# Patient Record
Sex: MALE | Race: WHITE | NOT HISPANIC OR LATINO | Employment: OTHER | URBAN - METROPOLITAN AREA
[De-identification: names, ages, dates, MRNs, and addresses within clinical notes are randomized per-mention and may not be internally consistent; named-entity substitution may affect disease eponyms.]

---

## 2022-01-21 ENCOUNTER — APPOINTMENT (EMERGENCY)
Dept: RADIOLOGY | Facility: HOSPITAL | Age: 62
DRG: 674 | End: 2022-01-21
Payer: MEDICARE

## 2022-01-21 ENCOUNTER — HOSPITAL ENCOUNTER (INPATIENT)
Facility: HOSPITAL | Age: 62
LOS: 12 days | Discharge: NURSING, REHAB OR LONG-TERM CARE | DRG: 674 | End: 2022-02-02
Attending: EMERGENCY MEDICINE | Admitting: INTERNAL MEDICINE
Payer: MEDICARE

## 2022-01-21 DIAGNOSIS — N17.9 ACUTE KIDNEY INJURY SUPERIMPOSED ON CHRONIC KIDNEY DISEASE (HCC): ICD-10-CM

## 2022-01-21 DIAGNOSIS — N18.9 ACUTE KIDNEY INJURY SUPERIMPOSED ON CHRONIC KIDNEY DISEASE (HCC): ICD-10-CM

## 2022-01-21 DIAGNOSIS — E11.9 DIABETES MELLITUS (HCC): ICD-10-CM

## 2022-01-21 DIAGNOSIS — N18.6 ESRD (END STAGE RENAL DISEASE) (HCC): ICD-10-CM

## 2022-01-21 DIAGNOSIS — N19 RENAL FAILURE: Primary | ICD-10-CM

## 2022-01-21 DIAGNOSIS — S22.079A T10 VERTEBRAL FRACTURE (HCC): ICD-10-CM

## 2022-01-21 LAB
2HR DELTA HS TROPONIN: -1 NG/L
ALBUMIN SERPL BCP-MCNC: 2.3 G/DL (ref 3.5–5)
ALP SERPL-CCNC: 104 U/L (ref 46–116)
ALT SERPL W P-5'-P-CCNC: 32 U/L (ref 12–78)
ANION GAP SERPL CALCULATED.3IONS-SCNC: 13 MMOL/L (ref 4–13)
AST SERPL W P-5'-P-CCNC: 37 U/L (ref 5–45)
BACTERIA UR QL AUTO: NORMAL /HPF
BASOPHILS # BLD AUTO: 0.03 THOUSANDS/ΜL (ref 0–0.1)
BASOPHILS NFR BLD AUTO: 0 % (ref 0–1)
BILIRUB SERPL-MCNC: 0.19 MG/DL (ref 0.2–1)
BILIRUB UR QL STRIP: NEGATIVE
BUN SERPL-MCNC: 63 MG/DL (ref 5–25)
CALCIUM ALBUM COR SERPL-MCNC: 9.3 MG/DL (ref 8.3–10.1)
CALCIUM SERPL-MCNC: 7.9 MG/DL (ref 8.3–10.1)
CARDIAC TROPONIN I PNL SERPL HS: 16 NG/L
CARDIAC TROPONIN I PNL SERPL HS: 17 NG/L
CHLORIDE SERPL-SCNC: 108 MMOL/L (ref 100–108)
CLARITY UR: CLEAR
CO2 SERPL-SCNC: 19 MMOL/L (ref 21–32)
COLOR UR: ABNORMAL
CREAT SERPL-MCNC: 5.79 MG/DL (ref 0.6–1.3)
EOSINOPHIL # BLD AUTO: 0.59 THOUSAND/ΜL (ref 0–0.61)
EOSINOPHIL NFR BLD AUTO: 7 % (ref 0–6)
ERYTHROCYTE [DISTWIDTH] IN BLOOD BY AUTOMATED COUNT: 14.7 % (ref 11.6–15.1)
GFR SERPL CREATININE-BSD FRML MDRD: 9 ML/MIN/1.73SQ M
GLUCOSE SERPL-MCNC: 128 MG/DL (ref 65–140)
GLUCOSE SERPL-MCNC: 149 MG/DL (ref 65–140)
GLUCOSE SERPL-MCNC: 176 MG/DL (ref 65–140)
GLUCOSE UR STRIP-MCNC: ABNORMAL MG/DL
HCT VFR BLD AUTO: 32.2 % (ref 36.5–49.3)
HGB BLD-MCNC: 10 G/DL (ref 12–17)
HGB UR QL STRIP.AUTO: ABNORMAL
IMM GRANULOCYTES # BLD AUTO: 0.04 THOUSAND/UL (ref 0–0.2)
IMM GRANULOCYTES NFR BLD AUTO: 1 % (ref 0–2)
KETONES UR STRIP-MCNC: NEGATIVE MG/DL
LEUKOCYTE ESTERASE UR QL STRIP: NEGATIVE
LYMPHOCYTES # BLD AUTO: 0.83 THOUSANDS/ΜL (ref 0.6–4.47)
LYMPHOCYTES NFR BLD AUTO: 10 % (ref 14–44)
MAGNESIUM SERPL-MCNC: 2.1 MG/DL (ref 1.6–2.6)
MCH RBC QN AUTO: 28.9 PG (ref 26.8–34.3)
MCHC RBC AUTO-ENTMCNC: 31.1 G/DL (ref 31.4–37.4)
MCV RBC AUTO: 93 FL (ref 82–98)
MONOCYTES # BLD AUTO: 0.8 THOUSAND/ΜL (ref 0.17–1.22)
MONOCYTES NFR BLD AUTO: 10 % (ref 4–12)
NEUTROPHILS # BLD AUTO: 6.09 THOUSANDS/ΜL (ref 1.85–7.62)
NEUTS SEG NFR BLD AUTO: 72 % (ref 43–75)
NITRITE UR QL STRIP: NEGATIVE
NON-SQ EPI CELLS URNS QL MICRO: NORMAL /HPF
NRBC BLD AUTO-RTO: 0 /100 WBCS
NT-PROBNP SERPL-MCNC: 1864 PG/ML
PH UR STRIP.AUTO: 6 [PH]
PLATELET # BLD AUTO: 347 THOUSANDS/UL (ref 149–390)
PMV BLD AUTO: 9.4 FL (ref 8.9–12.7)
POTASSIUM SERPL-SCNC: 5.1 MMOL/L (ref 3.5–5.3)
PROT SERPL-MCNC: 6.9 G/DL (ref 6.4–8.2)
PROT UR STRIP-MCNC: ABNORMAL MG/DL
RBC # BLD AUTO: 3.46 MILLION/UL (ref 3.88–5.62)
RBC #/AREA URNS AUTO: NORMAL /HPF
SODIUM SERPL-SCNC: 140 MMOL/L (ref 136–145)
SP GR UR STRIP.AUTO: 1.01 (ref 1–1.03)
UROBILINOGEN UR QL STRIP.AUTO: 0.2 E.U./DL
WBC # BLD AUTO: 8.38 THOUSAND/UL (ref 4.31–10.16)
WBC #/AREA URNS AUTO: NORMAL /HPF

## 2022-01-21 PROCEDURE — 71045 X-RAY EXAM CHEST 1 VIEW: CPT

## 2022-01-21 PROCEDURE — 99285 EMERGENCY DEPT VISIT HI MDM: CPT | Performed by: EMERGENCY MEDICINE

## 2022-01-21 PROCEDURE — 80053 COMPREHEN METABOLIC PANEL: CPT | Performed by: EMERGENCY MEDICINE

## 2022-01-21 PROCEDURE — 96375 TX/PRO/DX INJ NEW DRUG ADDON: CPT

## 2022-01-21 PROCEDURE — 99285 EMERGENCY DEPT VISIT HI MDM: CPT

## 2022-01-21 PROCEDURE — 83735 ASSAY OF MAGNESIUM: CPT | Performed by: EMERGENCY MEDICINE

## 2022-01-21 PROCEDURE — 96374 THER/PROPH/DIAG INJ IV PUSH: CPT

## 2022-01-21 PROCEDURE — 72100 X-RAY EXAM L-S SPINE 2/3 VWS: CPT

## 2022-01-21 PROCEDURE — 83880 ASSAY OF NATRIURETIC PEPTIDE: CPT | Performed by: EMERGENCY MEDICINE

## 2022-01-21 PROCEDURE — 99223 1ST HOSP IP/OBS HIGH 75: CPT | Performed by: INTERNAL MEDICINE

## 2022-01-21 PROCEDURE — 93005 ELECTROCARDIOGRAM TRACING: CPT

## 2022-01-21 PROCEDURE — 84484 ASSAY OF TROPONIN QUANT: CPT | Performed by: EMERGENCY MEDICINE

## 2022-01-21 PROCEDURE — 85025 COMPLETE CBC W/AUTO DIFF WBC: CPT | Performed by: EMERGENCY MEDICINE

## 2022-01-21 PROCEDURE — 82948 REAGENT STRIP/BLOOD GLUCOSE: CPT

## 2022-01-21 PROCEDURE — 36415 COLL VENOUS BLD VENIPUNCTURE: CPT | Performed by: EMERGENCY MEDICINE

## 2022-01-21 PROCEDURE — 81001 URINALYSIS AUTO W/SCOPE: CPT | Performed by: EMERGENCY MEDICINE

## 2022-01-21 RX ORDER — CLOPIDOGREL BISULFATE 75 MG/1
75 TABLET ORAL DAILY
COMMUNITY
Start: 2021-11-02

## 2022-01-21 RX ORDER — ATORVASTATIN CALCIUM 80 MG/1
80 TABLET, FILM COATED ORAL DAILY
Status: DISCONTINUED | OUTPATIENT
Start: 2022-01-22 | End: 2022-02-02 | Stop reason: HOSPADM

## 2022-01-21 RX ORDER — FUROSEMIDE 10 MG/ML
40 INJECTION INTRAMUSCULAR; INTRAVENOUS ONCE
Status: COMPLETED | OUTPATIENT
Start: 2022-01-21 | End: 2022-01-21

## 2022-01-21 RX ORDER — ACETAMINOPHEN 325 MG/1
650 TABLET ORAL EVERY 6 HOURS PRN
Status: DISCONTINUED | OUTPATIENT
Start: 2022-01-21 | End: 2022-01-25

## 2022-01-21 RX ORDER — HYDROMORPHONE HCL/PF 1 MG/ML
1 SYRINGE (ML) INJECTION ONCE
Status: DISCONTINUED | OUTPATIENT
Start: 2022-01-21 | End: 2022-02-02 | Stop reason: HOSPADM

## 2022-01-21 RX ORDER — CLOPIDOGREL BISULFATE 75 MG/1
75 TABLET ORAL DAILY
Status: DISCONTINUED | OUTPATIENT
Start: 2022-01-22 | End: 2022-01-24

## 2022-01-21 RX ORDER — ONDANSETRON 2 MG/ML
4 INJECTION INTRAMUSCULAR; INTRAVENOUS EVERY 6 HOURS PRN
Status: DISCONTINUED | OUTPATIENT
Start: 2022-01-21 | End: 2022-02-02 | Stop reason: HOSPADM

## 2022-01-21 RX ORDER — LIDOCAINE 50 MG/G
1 PATCH TOPICAL ONCE
Status: COMPLETED | OUTPATIENT
Start: 2022-01-21 | End: 2022-01-22

## 2022-01-21 RX ORDER — PIOGLITAZONEHYDROCHLORIDE 15 MG/1
15 TABLET ORAL DAILY
COMMUNITY
Start: 2021-11-02 | End: 2022-02-02 | Stop reason: HOSPADM

## 2022-01-21 RX ORDER — HEPARIN SODIUM 5000 [USP'U]/ML
5000 INJECTION, SOLUTION INTRAVENOUS; SUBCUTANEOUS EVERY 8 HOURS SCHEDULED
Status: DISCONTINUED | OUTPATIENT
Start: 2022-01-21 | End: 2022-01-25

## 2022-01-21 RX ORDER — CARVEDILOL 12.5 MG/1
12.5 TABLET ORAL 2 TIMES DAILY
Status: DISCONTINUED | OUTPATIENT
Start: 2022-01-21 | End: 2022-02-02 | Stop reason: HOSPADM

## 2022-01-21 RX ORDER — CARVEDILOL 6.25 MG/1
12.5 TABLET ORAL 2 TIMES DAILY
COMMUNITY
Start: 2021-11-16

## 2022-01-21 RX ORDER — HYDROMORPHONE HCL/PF 1 MG/ML
1 SYRINGE (ML) INJECTION ONCE
Status: COMPLETED | OUTPATIENT
Start: 2022-01-21 | End: 2022-01-21

## 2022-01-21 RX ORDER — NIFEDIPINE 30 MG/1
30 TABLET, EXTENDED RELEASE ORAL 2 TIMES DAILY
Status: DISCONTINUED | OUTPATIENT
Start: 2022-01-21 | End: 2022-01-23

## 2022-01-21 RX ORDER — ALLOPURINOL 100 MG/1
100 TABLET ORAL DAILY
Status: DISCONTINUED | OUTPATIENT
Start: 2022-01-22 | End: 2022-02-02 | Stop reason: HOSPADM

## 2022-01-21 RX ORDER — LIDOCAINE 50 MG/G
1 PATCH TOPICAL DAILY
Status: DISCONTINUED | OUTPATIENT
Start: 2022-01-22 | End: 2022-02-02 | Stop reason: HOSPADM

## 2022-01-21 RX ORDER — ATORVASTATIN CALCIUM 80 MG/1
80 TABLET, FILM COATED ORAL DAILY
COMMUNITY
Start: 2021-11-02 | End: 2022-01-31

## 2022-01-21 RX ORDER — ALLOPURINOL 100 MG/1
100 TABLET ORAL DAILY
COMMUNITY
Start: 2021-11-02 | End: 2022-07-06

## 2022-01-21 RX ORDER — ONDANSETRON 2 MG/ML
4 INJECTION INTRAMUSCULAR; INTRAVENOUS ONCE
Status: COMPLETED | OUTPATIENT
Start: 2022-01-21 | End: 2022-01-21

## 2022-01-21 RX ORDER — NIFEDIPINE 30 MG/1
30 TABLET, FILM COATED, EXTENDED RELEASE ORAL 2 TIMES DAILY
COMMUNITY
Start: 2021-11-15

## 2022-01-21 RX ORDER — FENTANYL CITRATE 50 UG/ML
50 INJECTION, SOLUTION INTRAMUSCULAR; INTRAVENOUS ONCE
Status: COMPLETED | OUTPATIENT
Start: 2022-01-21 | End: 2022-01-21

## 2022-01-21 RX ORDER — HYDROMORPHONE HCL/PF 1 MG/ML
1 SYRINGE (ML) INJECTION EVERY 4 HOURS PRN
Status: DISCONTINUED | OUTPATIENT
Start: 2022-01-21 | End: 2022-01-23

## 2022-01-21 RX ORDER — ALBUMIN (HUMAN) 12.5 G/50ML
25 SOLUTION INTRAVENOUS EVERY 12 HOURS SCHEDULED
Status: DISCONTINUED | OUTPATIENT
Start: 2022-01-22 | End: 2022-01-25

## 2022-01-21 RX ORDER — FUROSEMIDE 10 MG/ML
40 INJECTION INTRAMUSCULAR; INTRAVENOUS
Status: DISCONTINUED | OUTPATIENT
Start: 2022-01-22 | End: 2022-01-23

## 2022-01-21 RX ORDER — PIOGLITAZONEHYDROCHLORIDE 15 MG/1
15 TABLET ORAL DAILY
Status: DISCONTINUED | OUTPATIENT
Start: 2022-01-22 | End: 2022-01-23

## 2022-01-21 RX ORDER — ONDANSETRON 2 MG/ML
4 INJECTION INTRAMUSCULAR; INTRAVENOUS EVERY 6 HOURS PRN
Status: DISCONTINUED | OUTPATIENT
Start: 2022-01-21 | End: 2022-01-21 | Stop reason: SDUPTHER

## 2022-01-21 RX ADMIN — HYDROMORPHONE HYDROCHLORIDE 1 MG: 1 INJECTION, SOLUTION INTRAMUSCULAR; INTRAVENOUS; SUBCUTANEOUS at 16:04

## 2022-01-21 RX ADMIN — CARVEDILOL 12.5 MG: 12.5 TABLET, FILM COATED ORAL at 17:44

## 2022-01-21 RX ADMIN — LIDOCAINE 5% 1 PATCH: 700 PATCH TOPICAL at 16:46

## 2022-01-21 RX ADMIN — HYDROMORPHONE HYDROCHLORIDE 1 MG: 1 INJECTION, SOLUTION INTRAMUSCULAR; INTRAVENOUS; SUBCUTANEOUS at 21:58

## 2022-01-21 RX ADMIN — HYDROMORPHONE HYDROCHLORIDE 1 MG: 1 INJECTION, SOLUTION INTRAMUSCULAR; INTRAVENOUS; SUBCUTANEOUS at 14:40

## 2022-01-21 RX ADMIN — FUROSEMIDE 40 MG: 10 INJECTION, SOLUTION INTRAMUSCULAR; INTRAVENOUS at 12:26

## 2022-01-21 RX ADMIN — HYDROMORPHONE HYDROCHLORIDE 1 MG: 1 INJECTION, SOLUTION INTRAMUSCULAR; INTRAVENOUS; SUBCUTANEOUS at 17:45

## 2022-01-21 RX ADMIN — FENTANYL CITRATE 50 MCG: 50 INJECTION INTRAMUSCULAR; INTRAVENOUS at 12:26

## 2022-01-21 RX ADMIN — INSULIN LISPRO 2 UNITS: 100 INJECTION, SOLUTION INTRAVENOUS; SUBCUTANEOUS at 17:44

## 2022-01-21 RX ADMIN — ONDANSETRON 4 MG: 2 INJECTION INTRAMUSCULAR; INTRAVENOUS at 16:30

## 2022-01-21 RX ADMIN — HEPARIN SODIUM 5000 UNITS: 5000 INJECTION INTRAVENOUS; SUBCUTANEOUS at 21:21

## 2022-01-21 RX ADMIN — NIFEDIPINE 30 MG: 30 TABLET, EXTENDED RELEASE ORAL at 17:43

## 2022-01-21 NOTE — ED PROVIDER NOTES
History  Chief Complaint   Patient presents with   Efren Hsarad Fall     pt reports a fall a week ago  Pt reports bilatv lower leg swelling that worsened after the fall  Pt reports scrotum started to swell 2 days ago  Pt reports lower back pain since the fall   Pt denies any difficuty urinating   Back Pain     69-year-old male presents after a fall a week ago  Complaining of lower back pain  He also has bilateral lower leg swelling that has worsened after the fall which is going up his thighs as well as the scrotum  Denies any head injury loss of consciousness neck pain  No abdominal pain noted  History of chronic kidney disease says he was supposed to be on diuretics however he stopped because of his failing kidney function  No numbness tingling weakness in his legs no urinary incontinence no bowel incontinence no urine retention noted  History provided by:  Patient   used: No        Prior to Admission Medications   Prescriptions Last Dose Informant Patient Reported? Taking? NIFEdipine ER (ADALAT CC) 30 MG 24 hr tablet 1 19 at 0900  Yes Yes   Sig: Take 30 mg by mouth 2 (two) times a day   allopurinol (ZYLOPRIM) 100 mg tablet 1 19 at 0900  Yes Yes   Sig: Take 100 mg by mouth daily   atorvastatin (LIPITOR) 80 mg tablet 1 19 at 0900  Yes Yes   Sig: Take 80 mg by mouth daily   carvedilol (COREG) 6 25 mg tablet 1/19/2022 at 0900  Yes Yes   Sig: Take 12 5 mg by mouth 2 (two) times a day   clopidogrel (PLAVIX) 75 mg tablet 1/19/2022 at 0900  Yes Yes   Sig: Take 75 mg by mouth daily   linaGLIPtin 5 MG TABS 1/19/2022 at 0900  Yes Yes   Sig: Take 5 mg by mouth   pioglitazone (ACTOS) 15 mg tablet 1/19/2022 at 0900  Yes Yes   Sig: Take 15 mg by mouth daily      Facility-Administered Medications: None       Past Medical History:   Diagnosis Date    CKD     [facility] [address] [facility]     Hypertension     [facility] toe amputee [facility] [address] [facility]     TIA (transient ischemic attack)        History reviewed   No pertinent surgical history  History reviewed  No pertinent family history  I have reviewed and agree with the history as documented  E-Cigarette/Vaping    E-Cigarette Use Never User      E-Cigarette/Vaping Substances     Social History     Tobacco Use    Smoking status: Never Smoker    Smokeless tobacco: Never Used   Vaping Use    Vaping Use: Never used   Substance Use Topics    Alcohol use: Not Currently     Alcohol/week: 0 0 standard drinks     Comment: 0    Drug use: Never       Review of Systems   Constitutional: Negative  HENT: Negative  Eyes: Negative  Respiratory: Negative  Cardiovascular: Positive for leg swelling  Gastrointestinal: Negative  Endocrine: Negative  Genitourinary: Positive for penile swelling and scrotal swelling  Musculoskeletal: Positive for back pain  Skin: Negative  Allergic/Immunologic: Negative  Neurological: Negative  Hematological: Negative  Psychiatric/Behavioral: Negative  All other systems reviewed and are negative  Physical Exam  Physical Exam  Constitutional:       Appearance: Normal appearance  HENT:      Head: Normocephalic and atraumatic  Nose: Nose normal       Mouth/Throat:      Mouth: Mucous membranes are moist    Eyes:      Extraocular Movements: Extraocular movements intact  Pupils: Pupils are equal, round, and reactive to light  Cardiovascular:      Rate and Rhythm: Normal rate and regular rhythm  Pulmonary:      Effort: Pulmonary effort is normal       Breath sounds: Normal breath sounds  Abdominal:      General: Abdomen is flat  Bowel sounds are normal       Palpations: Abdomen is soft  Genitourinary:     Comments: Testicular swelling no did  Abdominal swelling edema noted with no evidence of infection  Musculoskeletal:         General: Swelling present  Normal range of motion  Cervical back: Normal range of motion and neck supple        Comments: Diffuse lumbar spine tenderness noted no step-offs noted  Skin:     General: Skin is warm  Capillary Refill: Capillary refill takes less than 2 seconds  Neurological:      General: No focal deficit present  Mental Status: He is alert and oriented to person, place, and time  Mental status is at baseline  Psychiatric:         Mood and Affect: Mood normal          Thought Content:  Thought content normal          Vital Signs  ED Triage Vitals   Temperature Pulse Respirations Blood Pressure SpO2   01/21/22 1721 01/21/22 1147 01/21/22 1147 01/21/22 1147 01/21/22 1147   97 8 °F (36 6 °C) 83 18 (!) 215/91 99 %      Temp Source Heart Rate Source Patient Position - Orthostatic VS BP Location FiO2 (%)   01/21/22 1721 01/21/22 1147 01/21/22 1147 01/21/22 1147 --   Oral Monitor Lying Right arm       Pain Score       01/21/22 1147       7           Vitals:    01/24/22 2252 01/25/22 0544 01/25/22 0800 01/25/22 1546   BP: 140/64 139/63 136/60 151/65   Pulse: 70      Patient Position - Orthostatic VS: Lying            Visual Acuity      ED Medications  Medications   HYDROmorphone (DILAUDID) injection 1 mg (1 mg Intravenous Not Given 1/21/22 1640)   allopurinol (ZYLOPRIM) tablet 100 mg (100 mg Oral Given 1/25/22 0805)   atorvastatin (LIPITOR) tablet 80 mg (80 mg Oral Given 1/25/22 0805)   carvedilol (COREG) tablet 12 5 mg (12 5 mg Oral Given 1/25/22 0805)   ondansetron (ZOFRAN) injection 4 mg (4 mg Intravenous Not Given 1/24/22 0039)   heparin (porcine) subcutaneous injection 5,000 Units (5,000 Units Subcutaneous Given 1/25/22 1327)   lidocaine (LIDODERM) 5 % patch 1 patch (1 patch Topical Medication Applied 1/25/22 0805)   insulin lispro (HumaLOG) 100 units/mL subcutaneous injection 2-12 Units (2 Units Subcutaneous Not Given 1/25/22 1633)   insulin lispro (HumaLOG) 100 units/mL subcutaneous injection 1-5 Units (1 Units Subcutaneous Not Given 1/24/22 2127)   NIFEdipine (PROCARDIA XL) 24 hr tablet 30 mg (30 mg Oral Given 1/25/22 0805)   sodium bicarbonate tablet 1,300 mg (1,300 mg Oral Given 1/25/22 1326)   oxyCODONE (ROXICODONE) IR tablet 2 5 mg (has no administration in time range)   furosemide (LASIX) injection 80 mg (has no administration in time range)   sevelamer (RENAGEL) tablet 800 mg (800 mg Oral Given 1/25/22 1226)   albumin human (FLEXBUMIN) 25 % injection 25 g (has no administration in time range)   acetaminophen (TYLENOL) tablet 650 mg (650 mg Oral Given 1/25/22 1326)   HYDROmorphone (DILAUDID) injection 0 5 mg (has no administration in time range)   oxyCODONE (ROXICODONE) IR tablet 5 mg (5 mg Oral Given 1/25/22 1327)   fentanyl citrate (PF) 100 MCG/2ML 50 mcg (50 mcg Intravenous Given 1/21/22 1226)   furosemide (LASIX) injection 40 mg (40 mg Intravenous Given 1/21/22 1226)   HYDROmorphone (DILAUDID) injection 1 mg (1 mg Intravenous Given 1/21/22 1440)   HYDROmorphone (DILAUDID) injection 1 mg (1 mg Intravenous Given 1/21/22 1604)   ondansetron (ZOFRAN) injection 4 mg (4 mg Intravenous Given 1/21/22 1630)   lidocaine (LIDODERM) 5 % patch 1 patch (1 patch Topical Patch Removed 1/22/22 0623)       Diagnostic Studies  Results Reviewed     Procedure Component Value Units Date/Time    HS Troponin I 2hr [[id number]] Collected: 01/21/22 1402    Lab Status: Final result Specimen: Blood from Arm, Right Updated: 01/21/22 1440     hs TnI 2hr 16 ng/L      Delta 2hr hsTnI -1 ng/L     Urine Microscopic [[id number]]  (Normal) Collected: 01/21/22 1329    Lab Status: Final result Specimen: Urine, Clean Catch Updated: 01/21/22 1347     RBC, UA 0-1 /hpf      WBC, UA 0-1 /hpf      Epithelial Cells None Seen /hpf      Bacteria, UA None Seen /hpf     UA (URINE) with reflex to Scope [[id number]]  (Abnormal) Collected: 01/21/22 1329    Lab Status: Final result Specimen: Urine, Clean Catch Updated: 01/21/22 1339     Color, UA Light Yellow     Clarity, UA Clear     Specific Gravity, UA 1 015     pH, UA 6 0     Leukocytes, UA Negative     Nitrite, UA Negative     Protein,  (2+) mg/dl      Glucose,  (1/4%) mg/dl      Ketones, UA Negative mg/dl      Urobilinogen, UA 0 2 E U /dl      Bilirubin, UA Negative     Blood, UA Small    HS Troponin 0hr (reflex protocol) [[id number]] Collected: 01/21/22 1218    Lab Status: Final result Specimen: Blood from Arm, Right Updated: 01/21/22 1251     hs TnI 0hr 17 ng/L     Comprehensive metabolic panel [[id number]]  (Abnormal) Collected: 01/21/22 1218    Lab Status: Final result Specimen: Blood from Arm, Right Updated: 01/21/22 1251     Sodium 140 mmol/L      Potassium 5 1 mmol/L      Chloride 108 mmol/L      CO2 19 mmol/L      ANION GAP 13 mmol/L      BUN 63 mg/dL      Creatinine 5 79 mg/dL      Glucose 149 mg/dL      Calcium 7 9 mg/dL      Corrected Calcium 9 3 mg/dL      AST 37 U/L      ALT 32 U/L      Alkaline Phosphatase 104 U/L      Total Protein 6 9 g/dL      Albumin 2 3 g/dL      Total Bilirubin 0 19 mg/dL      eGFR 9 ml/min/1 73sq m     Narrative:      [facility] guidelines for Chronic Kidney Disease (CKD):     Stage 1 with normal or high GFR (GFR > 90 mL/min/1 73 square meters)    Stage 2 Mild CKD (GFR = 60-89 mL/min/1 73 square meters)    Stage 3A Moderate CKD (GFR = 45-59 mL/min/1 73 square meters)    Stage 3B Moderate CKD (GFR = 30-44 mL/min/1 73 square meters)    Stage 4 Severe CKD (GFR = 15-29 mL/min/1 73 square meters)    Stage 5 End Stage CKD (GFR <15 mL/min/1 73 square meters)  Note: GFR calculation is accurate only with a steady state creatinine    Magnesium [[id number]]  (Normal) Collected: 01/21/22 1218    Lab Status: Final result Specimen: Blood from Arm, Right Updated: 01/21/22 1251     Magnesium 2 1 mg/dL     NT-BNP PRO [[id number]]  (Abnormal) Collected: 01/21/22 1218    Lab Status: Final result Specimen: Blood from Arm, Right Updated: 01/21/22 1251     NT-proBNP 1,864 pg/mL     CBC and differential [[id number]]  (Abnormal) Collected: 01/21/22 1218    Lab Status: Final result Specimen: Blood from Arm, Right Updated: 01/21/22 1224     WBC 8 38 Thousand/uL      RBC 3 46 Million/uL      Hemoglobin 10 0 g/dL      Hematocrit 32 2 %      MCV 93 fL      MCH 28 9 pg      MCHC 31 1 g/dL      RDW 14 7 %      MPV 9 4 fL      Platelets 446 Thousands/uL      nRBC 0 /100 WBCs      Neutrophils Relative 72 %      Immat GRANS % 1 %      Lymphocytes Relative 10 %      Monocytes Relative 10 %      Eosinophils Relative 7 %      Basophils Relative 0 %      Neutrophils Absolute 6 09 Thousands/µL      Immature Grans Absolute 0 04 Thousand/uL      Lymphocytes Absolute 0 83 Thousands/µL      Monocytes Absolute 0 80 Thousand/µL      Eosinophils Absolute 0 59 Thousand/µL      Basophils Absolute 0 03 Thousands/µL                  XR spine thoracic 3 vw   Final Result by Yessi Jesus MD (01/24 5539)      Known nondisplaced oblique T10 vertebral body fracture not clearly appreciated on this plain film study although there is some mild anterior wedge compression deformity hasn't without appreciable osseous retropulsion  Workstation performed: [id number]         CT abdomen pelvis wo contrast   Final Result by Jamaica Montesinos DO (01/23 1223)   1  Minimally displaced, oblique fracture involving the T10 vertebral body  Fracture line does not extend into the posterior elements  No bony retropulsion  Follow-up neurosurgical evaluation recommended  2   Additional incidental findings as described above  I personally discussed this study with Tamiko Rg on 1/23/2022 at 12:00 PM                      Workstation performed: [id number]         XR spine lumbar 2 or 3 views injury   Final Result by Kassandra Montoya MD (01/21 9945)      No fracture or malalignment  Possible ankylosing spondylitis  Workstation performed: [id number]         XR chest 1 view   Final Result by Kassandra Montoya MD (01/21 9511)      No acute cardiopulmonary disease                    Workstation performed: [id number]         IR biopsy kidney [place] kit no laterality    (Results Pending)              Procedures  Procedures         ED Course                                             MDM  Number of Diagnoses or Management Options     Amount and/or Complexity of Data Reviewed  Clinical lab tests: ordered and reviewed  Tests in the radiology section of CPT®: ordered and reviewed  Tests in the medicine section of CPT®: reviewed and ordered    Patient Progress  Patient progress: stable      Disposition  Final diagnoses:   Renal failure     Time reflects when diagnosis was documented in both MDM as applicable and the Disposition within this note     Time User Action Codes Description Comment    1/21/2022  3:36 PM Bear Beth Add [N19] Renal failure     1/22/2022  6:35 PM Joel Dalton Add [N17 9,  N18 9] Acute kidney injury superimposed on chronic kidney disease [facility])       ED Disposition     ED Disposition Condition Date/Time Comment    Admit Stable Fri Jan 21, 2022  3:36 PM          Follow-up Information    None         Current Discharge Medication List      CONTINUE these medications which have NOT CHANGED    Details   allopurinol (ZYLOPRIM) 100 mg tablet Take 100 mg by mouth daily      atorvastatin (LIPITOR) 80 mg tablet Take 80 mg by mouth daily      carvedilol (COREG) 6 25 mg tablet Take 12 5 mg by mouth 2 (two) times a day      clopidogrel (PLAVIX) 75 mg tablet Take 75 mg by mouth daily      linaGLIPtin 5 MG TABS Take 5 mg by mouth      NIFEdipine ER (ADALAT CC) 30 MG 24 hr tablet Take 30 mg by mouth 2 (two) times a day      pioglitazone (ACTOS) 15 mg tablet Take 15 mg by mouth daily             No discharge procedures on file      PDMP Review     None          ED Provider  Electronically Signed by           Tata Griffin DO  01/25/22 6780

## 2022-01-21 NOTE — LETTER
To: [address] Admissions  From:  Guzman Madrigal [place] [phone]    Additional Clinical for Emma Parker - requesting initial treatment in [facility], pt is going to rehab at [facility]    Thank you

## 2022-01-22 PROBLEM — R19.7 DIARRHEA: Status: ACTIVE | Noted: 2022-01-22

## 2022-01-22 LAB
ANION GAP SERPL CALCULATED.3IONS-SCNC: 11 MMOL/L (ref 4–13)
BUN SERPL-MCNC: 70 MG/DL (ref 5–25)
CALCIUM SERPL-MCNC: 7.5 MG/DL (ref 8.3–10.1)
CHLORIDE SERPL-SCNC: 104 MMOL/L (ref 100–108)
CO2 SERPL-SCNC: 17 MMOL/L (ref 21–32)
CREAT SERPL-MCNC: 5.79 MG/DL (ref 0.6–1.3)
ERYTHROCYTE [DISTWIDTH] IN BLOOD BY AUTOMATED COUNT: 14.9 % (ref 11.6–15.1)
GFR SERPL CREATININE-BSD FRML MDRD: 9 ML/MIN/1.73SQ M
GLUCOSE SERPL-MCNC: 104 MG/DL (ref 65–140)
GLUCOSE SERPL-MCNC: 117 MG/DL (ref 65–140)
GLUCOSE SERPL-MCNC: 88 MG/DL (ref 65–140)
GLUCOSE SERPL-MCNC: 92 MG/DL (ref 65–140)
HCT VFR BLD AUTO: 28 % (ref 36.5–49.3)
HGB BLD-MCNC: 8.7 G/DL (ref 12–17)
MCH RBC QN AUTO: 29.2 PG (ref 26.8–34.3)
MCHC RBC AUTO-ENTMCNC: 31.1 G/DL (ref 31.4–37.4)
MCV RBC AUTO: 94 FL (ref 82–98)
PLATELET # BLD AUTO: 302 THOUSANDS/UL (ref 149–390)
PMV BLD AUTO: 9.2 FL (ref 8.9–12.7)
POTASSIUM SERPL-SCNC: 4.5 MMOL/L (ref 3.5–5.3)
RBC # BLD AUTO: 2.98 MILLION/UL (ref 3.88–5.62)
SODIUM SERPL-SCNC: 132 MMOL/L (ref 136–145)
WBC # BLD AUTO: 6.53 THOUSAND/UL (ref 4.31–10.16)

## 2022-01-22 PROCEDURE — 82948 REAGENT STRIP/BLOOD GLUCOSE: CPT

## 2022-01-22 PROCEDURE — 99232 SBSQ HOSP IP/OBS MODERATE 35: CPT | Performed by: INTERNAL MEDICINE

## 2022-01-22 PROCEDURE — 87505 NFCT AGENT DETECTION GI: CPT | Performed by: INTERNAL MEDICINE

## 2022-01-22 PROCEDURE — 85027 COMPLETE CBC AUTOMATED: CPT | Performed by: INTERNAL MEDICINE

## 2022-01-22 PROCEDURE — 87493 C DIFF AMPLIFIED PROBE: CPT | Performed by: INTERNAL MEDICINE

## 2022-01-22 PROCEDURE — 80048 BASIC METABOLIC PNL TOTAL CA: CPT | Performed by: INTERNAL MEDICINE

## 2022-01-22 RX ADMIN — FUROSEMIDE 40 MG: 10 INJECTION, SOLUTION INTRAMUSCULAR; INTRAVENOUS at 11:40

## 2022-01-22 RX ADMIN — CLOPIDOGREL BISULFATE 75 MG: 75 TABLET ORAL at 09:00

## 2022-01-22 RX ADMIN — ALLOPURINOL 100 MG: 100 TABLET ORAL at 09:00

## 2022-01-22 RX ADMIN — HEPARIN SODIUM 5000 UNITS: 5000 INJECTION INTRAVENOUS; SUBCUTANEOUS at 17:34

## 2022-01-22 RX ADMIN — HYDROMORPHONE HYDROCHLORIDE 1 MG: 1 INJECTION, SOLUTION INTRAMUSCULAR; INTRAVENOUS; SUBCUTANEOUS at 02:11

## 2022-01-22 RX ADMIN — PIOGLITAZONE HYDROCHLORIDE 15 MG: 15 TABLET ORAL at 09:13

## 2022-01-22 RX ADMIN — ALBUMIN (HUMAN) 25 G: 0.25 INJECTION, SOLUTION INTRAVENOUS at 09:00

## 2022-01-22 RX ADMIN — HEPARIN SODIUM 5000 UNITS: 5000 INJECTION INTRAVENOUS; SUBCUTANEOUS at 21:38

## 2022-01-22 RX ADMIN — HYDROMORPHONE HYDROCHLORIDE 1 MG: 1 INJECTION, SOLUTION INTRAMUSCULAR; INTRAVENOUS; SUBCUTANEOUS at 20:08

## 2022-01-22 RX ADMIN — HYDROMORPHONE HYDROCHLORIDE 1 MG: 1 INJECTION, SOLUTION INTRAMUSCULAR; INTRAVENOUS; SUBCUTANEOUS at 15:51

## 2022-01-22 RX ADMIN — CARVEDILOL 12.5 MG: 12.5 TABLET, FILM COATED ORAL at 09:00

## 2022-01-22 RX ADMIN — HEPARIN SODIUM 5000 UNITS: 5000 INJECTION INTRAVENOUS; SUBCUTANEOUS at 06:24

## 2022-01-22 RX ADMIN — HYDROMORPHONE HYDROCHLORIDE 1 MG: 1 INJECTION, SOLUTION INTRAMUSCULAR; INTRAVENOUS; SUBCUTANEOUS at 23:58

## 2022-01-22 RX ADMIN — ALBUMIN (HUMAN) 25 G: 0.25 INJECTION, SOLUTION INTRAVENOUS at 20:07

## 2022-01-22 RX ADMIN — ATORVASTATIN CALCIUM 80 MG: 80 TABLET, FILM COATED ORAL at 09:00

## 2022-01-22 RX ADMIN — HYDROMORPHONE HYDROCHLORIDE 1 MG: 1 INJECTION, SOLUTION INTRAMUSCULAR; INTRAVENOUS; SUBCUTANEOUS at 08:08

## 2022-01-22 RX ADMIN — LIDOCAINE 5% 1 PATCH: 700 PATCH TOPICAL at 09:00

## 2022-01-22 NOTE — H&P
Osvaldo 44  H&P- Milagros Stanley 1960, 57 y o  male MRN: [id number]  Unit/Bed#: 95795 Marilyn Kimball Encounter: [id number]  Primary Care Provider: Melquiades Redd MD   Date and time admitted to hospital: 1/21/2022 11:44 AM    * Acute kidney injury superimposed on chronic kidney disease [facility])  Assessment & Plan  Lab Results   Component Value Date    EGFR 9 01/21/2022    CREATININE 5 79 (H) 01/21/2022   Patient has history of CKD stage IV  Baseline creatinine appears to be around 4 3-4 7  Admission creatinine was 5 79  Patient also noted to have significant anasarca with minimally elevated potassium at 5 1  Etiology not clear at the current time-likely progression of CKD  No urgent indication for renal replacement therapy  Patient will be started on Lasix 40 milligram IV q 12 hours with albumin 25 grams q 12 hours  Nephrology consultation  CT scan of the abdomen pelvis was ordered to rule out hydronephrosis which is pending at the current time  UA showed trace amount of blood and protein  Avoid nephrotoxic agents and hypotension      Back pain  Assessment & Plan  Patient sustained a fall about 1 week ago slipping on a rug  Patient has been having severe lower back pain since then  Denies any bowel or bladder incontinence, tingling or numbness in the legs  Patient received fentanyl and 2 dose of Dilaudid in the ED with persistent pain  Lumbar x-ray showed no fractures or malalignment with possible ankylosing spondylitis  CT scan of the abdomen pelvis was ordered which is pending at the current time  Patient ordered for Lidoderm patch and Dilaudid p r n  For breakthrough pain    Hypertension  Assessment & Plan  Continue nifedipine 30 milligram p o  B i d  And Coreg 12 5 milligram p o  B i d    Avoid hypotension    Diabetes mellitus ([facility] [address] )  Assessment & Plan  No results found for: HGBA1C    Recent Labs     01/21/22  1732   POCGLU 176*       Blood Sugar Average: Last 72 hrs:  (P) 176   Patient is on linagliptin and Actos at home  Hold oral antidiabetic agents  Patient been Humalog sliding scale with Accu-Cheks q a c  And HS  Check hemoglobin A1c      VTE Pharmacologic Prophylaxis:   Moderate Risk (Score 3-4) - Pharmacological DVT Prophylaxis Ordered: heparin  Code Status: Level 1 - Full Code   Discussion with family: yes    Anticipated Length of Stay: Patient will be admitted on an inpatient basis with an anticipated length of stay of greater than 2 midnights secondary to Acute kidney injury, anasarca  Total Time for Visit, including Counseling / Coordination of Care: 70 minutes Greater than 50% of this total time spent on direct patient counseling and coordination of care  Chief Complaint:  Back pain and leg swelling    History of Present Illness:  Sarah Rodriguez is a 57 y o  male with a PMH of diabetes mellitus, hypertension, hyperlipidemia, CKD who presents with back pain for the past 1 week which was particularly bad since yesterday  Patient reported that his slipped on a rug and sustained accidental fall about a week ago  Patient has been having lower back pain since then without any radiation  Patient denies any tingling or numbness in the legs or bowel or bladder incontinence  Patient also reported leg swelling which is getting worse over the past 1 week  Patient has known history of CKD but did not follow-up with Nephrology other than in the hospital   Patient denies any chest pain, shortness of breath, abdominal pain, nausea vomiting  In the ED, patient noted to have significant elevation of 215/91 which improved to 172/92  Workup showed a BUN creatinine of 63 and 5 7 with an albumin level of 2 3 with proBNP of 1800    Review of Systems:  Review of Systems   Constitutional: Negative for chills, diaphoresis, fatigue and unexpected weight change     HENT: Negative for congestion, ear discharge, ear pain, facial swelling, hearing loss, mouth sores, nosebleeds, postnasal drip, rhinorrhea, sinus pressure, sneezing, sore throat, tinnitus, trouble swallowing and voice change  Eyes: Negative for photophobia, discharge, redness and visual disturbance  Respiratory: Negative for cough, chest tightness, shortness of breath, wheezing and stridor  Cardiovascular: Positive for leg swelling  Negative for chest pain and palpitations  Gastrointestinal: Negative for abdominal distention, abdominal pain, anal bleeding, blood in stool, constipation, diarrhea, nausea and vomiting  Endocrine: Negative for polydipsia, polyphagia and polyuria  Genitourinary: Negative for decreased urine volume, difficulty urinating, dysuria, flank pain, frequency, hematuria and urgency  Musculoskeletal: Positive for back pain  Negative for arthralgias and neck stiffness  Skin: Negative for pallor and rash  Neurological: Negative for dizziness, seizures, facial asymmetry, speech difficulty, light-headedness, numbness and headaches  Hematological: Negative for adenopathy  Does not bruise/bleed easily  Psychiatric/Behavioral: Negative for agitation and confusion  Past Medical and Surgical History:   Past Medical History:   Diagnosis Date    CKD     Diabetes mellitus ([facility] )     Hypertension     Left toe amputee ([facility] )     TIA (transient ischemic attack)        History reviewed  No pertinent surgical history  Meds/Allergies:  Prior to Admission medications    Medication Sig Start Date End Date Taking?  Authorizing Provider   allopurinol (ZYLOPRIM) 100 mg tablet Take 100 mg by mouth daily 11/2/21 1/31/22 Yes Historical Provider, MD   atorvastatin (LIPITOR) 80 mg tablet Take 80 mg by mouth daily 11/2/21 1/31/22 Yes Historical Provider, MD   carvedilol (COREG) 6 25 mg tablet Take 12 5 mg by mouth 2 (two) times a day 11/16/21  Yes Historical Provider, MD   clopidogrel (PLAVIX) 75 mg tablet Take 75 mg by mouth daily 11/2/21  Yes Historical Provider, MD   linaGLIPtin 5 MG TABS Take 5 mg by mouth   Yes Historical Provider, MD   NIFEdipine ER (ADALAT CC) 30 MG 24 hr tablet Take 30 mg by mouth 2 (two) times a day 11/15/21  Yes Historical Provider, MD   pioglitazone (ACTOS) 15 mg tablet Take 15 mg by mouth daily 11/2/21 1/31/22 Yes Historical Provider, MD     I have reviewed home medications using recent Epic encounter  Allergies: No Known Allergies    Social History:  Marital Status:      Substance Use History:   Social History     Substance and Sexual Activity   Alcohol Use Not Currently    Alcohol/week: 0 0 standard drinks    Comment: 0     Social History     Tobacco Use   Smoking Status Never Smoker   Smokeless Tobacco Never Used     Social History     Substance and Sexual Activity   Drug Use Never       Family History:  History reviewed  No pertinent family history  Physical Exam:     Vitals:   Blood Pressure: (!) 172/85 (01/21/22 1721)  Pulse: 81 (01/21/22 1721)  Temperature: 97 8 °F (36 6 °C) (01/21/22 1721)  Temp Source: Oral (01/21/22 1721)  Respirations: 17 (01/21/22 1721)  Height: 6' 1" (185 4 cm) (01/21/22 1721)  Weight - Scale: 130 kg (286 lb 9 6 oz) (01/21/22 1721)  SpO2: 99 % (01/21/22 1721)    Physical Exam  Constitutional:       Appearance: Normal appearance  HENT:      Head: Normocephalic and atraumatic  Eyes:      Extraocular Movements: Extraocular movements intact  Pupils: Pupils are equal, round, and reactive to light  Cardiovascular:      Rate and Rhythm: Normal rate and regular rhythm  Heart sounds: No murmur heard  No gallop  Pulmonary:      Effort: Pulmonary effort is normal       Breath sounds: Normal breath sounds  Abdominal:      General: Bowel sounds are normal       Palpations: Abdomen is soft  Tenderness: There is no abdominal tenderness  Musculoskeletal:         General: No swelling or deformity  Normal range of motion  Cervical back: Normal range of motion and neck supple  Right lower leg: Edema present  Left lower leg: Edema present  Comments: Bilateral +3 pedal edema extending up to the abdominal wall  No palpable spinal tenderness or paraspinal tenderness  Lower extremity strength is 5/5 and sensation is normal   Skin:     General: Skin is warm and dry  Neurological:      General: No focal deficit present  Mental Status: He is alert  Additional Data:     Lab Results:  Results from last 7 days   Lab Units 01/21/22  1218   WBC Thousand/uL 8 38   HEMOGLOBIN g/dL 10 0*   HEMATOCRIT % 32 2*   PLATELETS Thousands/uL 347   NEUTROS PCT % 72   LYMPHS PCT % 10*   MONOS PCT % 10   EOS PCT % 7*     Results from last 7 days   Lab Units 01/21/22  1218   SODIUM mmol/L 140   POTASSIUM mmol/L 5 1   CHLORIDE mmol/L 108   CO2 mmol/L 19*   BUN mg/dL 63*   CREATININE mg/dL 5 79*   ANION GAP mmol/L 13   CALCIUM mg/dL 7 9*   ALBUMIN g/dL 2 3*   TOTAL BILIRUBIN mg/dL 0 19*   ALK PHOS U/L 104   ALT U/L 32   AST U/L 37   GLUCOSE RANDOM mg/dL 149*         Results from last 7 days   Lab Units 01/21/22  1732   POC GLUCOSE mg/dl 176*               Imaging: Reviewed radiology reports from this admission including: chest xray  XR spine lumbar 2 or 3 views injury   Final Result by Amanda Lima MD (01/21 1313)      No fracture or malalignment  Possible ankylosing spondylitis  Workstation performed: [id number]         XR chest 1 view   Final Result by Amanda Lima MD (01/21 1311)      No acute cardiopulmonary disease  Workstation performed: [id number]         CT abdomen pelvis wo contrast    (Results Pending)       EKG and Other Studies Reviewed on Admission:   · EKG: NSR  HR 72     ** Please Note: This note has been constructed using a voice recognition system   **

## 2022-01-22 NOTE — ASSESSMENT & PLAN NOTE
No results found for: HGBA1C    Recent Labs     01/21/22  1732   POCGLU 176*       Blood Sugar Average: Last 72 hrs:  (P) 176   Patient is on linagliptin and Actos at home  Hold oral antidiabetic agents  Patient been Humalog sliding scale with Accu-Cheks q a c  And HS    Check hemoglobin A1c

## 2022-01-22 NOTE — ASSESSMENT & PLAN NOTE
Patient sustained a fall about 1 week ago slipping on a rug  Patient has been having severe lower back pain since then  Denies any bowel or bladder incontinence, tingling or numbness in the legs  Patient received fentanyl and 2 dose of Dilaudid in the ED with persistent pain  Lumbar x-ray showed no fractures or malalignment with possible ankylosing spondylitis  CT scan of the abdomen pelvis was ordered to look at the lumbar spine which is pending S patient was feeling tired today and wants to wait before the CT scan is done  Patient ordered for Lidoderm patch and Dilaudid p r n   For breakthrough pain with improved pain

## 2022-01-22 NOTE — PROGRESS NOTES
[address]  Progress Note - Sarah Signs 1960, 57 y o  male MRN: [id number]  Unit/Bed#: 02175 Marilyn Kimball Encounter: [id number]  Primary Care Provider: Regan Florian MD   Date and time admitted to hospital: 1/21/2022 11:44 AM    * Acute kidney injury superimposed on chronic kidney disease [facility])  Assessment & Plan  Lab Results   Component Value Date    EGFR 9 01/22/2022    EGFR 9 01/21/2022    CREATININE 5 79 (H) 01/22/2022    CREATININE 5 79 (H) 01/21/2022   Patient has history of CKD stage IV  Baseline creatinine appears to be around 4 3-4 7  Admission creatinine was 5 79  Patient also noted to have significant anasarca with minimally elevated potassium at 5 1  Etiology not clear at the current time-likely progression of CKD  No urgent indication for renal replacement therapy  Patient will be started on Lasix 40 milligram IV q 12 hours with albumin 25 grams q 12 hours  Patient reported that he is urinating well with improved scrotal swelling(documented urine output dose only 700 milliliters)  Nephrology was consulted  CT scan of the abdomen pelvis was ordered to rule out hydronephrosis which is pending at the current time  UA showed trace amount of blood and protein  Avoid nephrotoxic agents and hypotension      Back pain  Assessment & Plan  Patient sustained a fall about 1 week ago slipping on a rug  Patient has been having severe lower back pain since then  Denies any bowel or bladder incontinence, tingling or numbness in the legs  Patient received fentanyl and 2 dose of Dilaudid in the ED with persistent pain  Lumbar x-ray showed no fractures or malalignment with possible ankylosing spondylitis  CT scan of the abdomen pelvis was ordered to look at the lumbar spine which is pending S patient was feeling tired today and wants to wait before the CT scan is done  Patient ordered for Lidoderm patch and Dilaudid p r n   For breakthrough pain with improved pain    Diarrhea  Assessment & Plan  Patient reported 2 episodes of diarrhea this morning without any blood or mucus  Abdominal exam is benign  CT scan of the abdomen pelvis is pending at the current time    Hypertension  Assessment & Plan  Continue nifedipine 30 milligram p o  B i d  And Coreg 12 5 milligram p o  B i d  Avoid hypotension    Diabetes mellitus [facility])  Assessment & Plan  No results found for: HGBA1C    Recent Labs     22  1732 22  2034 22  0759 22  1142   POCGLU 176* 128 88 117       Blood Sugar Average: Last 72 hrs:  (P) 127 25   Patient is on linagliptin and Actos at home  Hold oral antidiabetic agents  Continue Humalog sliding scale with Accu-Cheks q a c  And HS  Check hemoglobin A1c         VTE Pharmacologic Prophylaxis:   High Risk (Score >/= 5) - Pharmacological DVT Prophylaxis Ordered: heparin  Sequential Compression Devices Ordered  Patient Centered Rounds: I performed bedside rounds with nursing staff today  Discussions with Specialists or Other Care Team Provider:     Education and Discussions with Family / Patient: Yes    Time Spent for Care: 45 minutes  More than 50% of total time spent on counseling and coordination of care as described above  Current Length of Stay: 1 day(s)  Current Patient Status: Inpatient   Certification Statement: The patient will continue to require additional inpatient hospital stay due to Acute kidney injury, Anasarca  Discharge Plan: Anticipate discharge in >72 hrs to home  Code Status: Level 1 - Full Code    Subjective:   Patient is urinating well     Patient is having diarrhea x2 episodes since this morning  Denies any abdominal pain  Objective:     Vitals:   Temp (24hrs), Av 5 °F (36 9 °C), Min:97 5 °F (36 4 °C), Max:99 5 °F (37 5 °C)    Temp:  [97 5 °F (36 4 °C)-99 5 °F (37 5 °C)] 98 3 °F (36 8 °C)  HR:  [79-89] 89  Resp:  [16-20] 18  BP: ()/(45-82) 99/59  SpO2:  [94 %-98 %] 94 %  Body mass index is 37 81 kg/m²       Input and Output Summary (last 24 hours): Intake/Output Summary (Last 24 hours) at 1/22/2022 1840  Last data filed at 1/21/2022 2159  Gross per 24 hour   Intake --   Output 700 ml   Net -700 ml       Physical Exam:   Physical Exam  Constitutional:       Appearance: Normal appearance  HENT:      Head: Normocephalic and atraumatic  Eyes:      Extraocular Movements: Extraocular movements intact  Pupils: Pupils are equal, round, and reactive to light  Cardiovascular:      Rate and Rhythm: Normal rate and regular rhythm  Heart sounds: No murmur heard  No gallop  Pulmonary:      Effort: Pulmonary effort is normal       Breath sounds: Normal breath sounds  Abdominal:      General: Bowel sounds are normal       Palpations: Abdomen is soft  Tenderness: There is no abdominal tenderness  Musculoskeletal:         General: No swelling or deformity  Normal range of motion  Cervical back: Normal range of motion and neck supple  Skin:     General: Skin is warm and dry  Neurological:      General: No focal deficit present  Mental Status: He is alert  Additional Data:     Labs:  Results from last 7 days   Lab Units 01/22/22  0804 01/21/22  1218 01/21/22  1218   WBC Thousand/uL 6 53   < > 8 38   HEMOGLOBIN g/dL 8 7*   < > 10 0*   HEMATOCRIT % 28 0*   < > 32 2*   PLATELETS Thousands/uL 302   < > 347   NEUTROS PCT %  --   --  72   LYMPHS PCT %  --   --  10*   MONOS PCT %  --   --  10   EOS PCT %  --   --  7*    < > = values in this interval not displayed       Results from last 7 days   Lab Units 01/22/22  0804 01/21/22  1218 01/21/22  1218   SODIUM mmol/L 132*   < > 140   POTASSIUM mmol/L 4 5   < > 5 1   CHLORIDE mmol/L 104   < > 108   CO2 mmol/L 17*   < > 19*   BUN mg/dL 70*   < > 63*   CREATININE mg/dL 5 79*   < > 5 79*   ANION GAP mmol/L 11   < > 13   CALCIUM mg/dL 7 5*   < > 7 9*   ALBUMIN g/dL  --   --  2 3*   TOTAL BILIRUBIN mg/dL  --   --  0 19*   ALK PHOS U/L  --   --  104   ALT U/L  -- --  32   AST U/L  --   --  37   GLUCOSE RANDOM mg/dL 92   < > 149*    < > = values in this interval not displayed  Results from last 7 days   Lab Units 01/22/22  1142 01/22/22  0759 01/21/22 2034 01/21/22  1732   POC GLUCOSE mg/dl 117 88 128 176*               Lines/Drains:  Invasive Devices  Report    Peripheral Intravenous Line            Peripheral IV 01/21/22 Right Antecubital 1 day                      Imaging:  Lumbar x-ray    Recent Cultures (last 7 days):         Last 24 Hours Medication List:   Current Facility-Administered Medications   Medication Dose Route Frequency Provider Last Rate    acetaminophen  650 mg Oral Q6H PRN Joel Dalton MD      albumin human  25 g Intravenous Q12H [address] Salazar Pichardo MD      allopurinol  100 mg Oral Daily Salazar Pichardo MD      atorvastatin  80 mg Oral Daily Salazar Pichardo MD      carvedilol  12 5 mg Oral BID Salazar Pichardo MD      clopidogrel  75 mg Oral Daily Salazar Pichrado MD      furosemide  40 mg Intravenous BID (diuretic) Salazar Pichardo MD      heparin (porcine)  5,000 Units Subcutaneous Q8H [address] Salazar Pichardo MD      HYDROmorphone  1 mg Intravenous Once Salazar Pichardo MD      HYDROmorphone  1 mg Intravenous Q4H PRN Salazar Pichardo MD      insulin lispro  1-5 Units Subcutaneous HS Salazar Pichardo MD      insulin lispro  2-12 Units Subcutaneous TID AC Salazar Pichardo MD      lidocaine  1 patch Topical Daily Salazar Pichardo MD      NIFEdipine  30 mg Oral BID Salazar Pichardo MD      ondansetron  4 mg Intravenous Q6H PRN Salazar Pichardo MD      pioglitazone  15 mg Oral Daily Salazar Pichardo MD          Today, Patient Was Seen By: Salazar Pichardo MD    **Please Note: This note may have been constructed using a voice recognition system  **

## 2022-01-22 NOTE — ASSESSMENT & PLAN NOTE
Lab Results   Component Value Date    EGFR 9 01/22/2022    EGFR 9 01/21/2022    CREATININE 5 79 (H) 01/22/2022    CREATININE 5 79 (H) 01/21/2022   Patient has history of CKD stage IV  Baseline creatinine appears to be around 4 3-4 7  Admission creatinine was 5 79  Patient also noted to have significant anasarca with minimally elevated potassium at 5 1  Etiology not clear at the current time-likely progression of CKD  No urgent indication for renal replacement therapy  Patient will be started on Lasix 40 milligram IV q 12 hours with albumin 25 grams q 12 hours  Patient reported that he is urinating well with improved scrotal swelling(documented urine output dose only 700 milliliters)  Nephrology was consulted  CT scan of the abdomen pelvis was ordered to rule out hydronephrosis which is pending at the current time    UA showed trace amount of blood and protein  Avoid nephrotoxic agents and hypotension

## 2022-01-22 NOTE — ASSESSMENT & PLAN NOTE
Patient reported 2 episodes of diarrhea this morning without any blood or mucus  Abdominal exam is benign  CT scan of the abdomen pelvis is pending at the current time

## 2022-01-22 NOTE — ASSESSMENT & PLAN NOTE
Continue nifedipine 30 milligram p o  B i d  And Coreg 12 5 milligram p o  B i d    Avoid hypotension

## 2022-01-22 NOTE — ASSESSMENT & PLAN NOTE
No results found for: HGBA1C    Recent Labs     01/21/22  1732 01/21/22  2034 01/22/22  0759 01/22/22  1142   POCGLU 176* 128 88 117       Blood Sugar Average: Last 72 hrs:  (P) 127 25   Patient is on linagliptin and Actos at home  Hold oral antidiabetic agents  Continue Humalog sliding scale with Accu-Cheks q a c  And HS    Check hemoglobin A1c

## 2022-01-22 NOTE — ASSESSMENT & PLAN NOTE
Patient sustained a fall about 1 week ago slipping on a rug  Patient has been having severe lower back pain since then  Denies any bowel or bladder incontinence, tingling or numbness in the legs  Patient received fentanyl and 2 dose of Dilaudid in the ED with persistent pain  Lumbar x-ray showed no fractures or malalignment with possible ankylosing spondylitis  CT scan of the abdomen pelvis was ordered which is pending at the current time  Patient ordered for Lidoderm patch and Dilaudid p r n   For breakthrough pain

## 2022-01-22 NOTE — ASSESSMENT & PLAN NOTE
Lab Results   Component Value Date    EGFR 9 01/21/2022    CREATININE 5 79 (H) 01/21/2022   Patient has history of CKD stage IV  Baseline creatinine appears to be around 4 3-4 7  Admission creatinine was 5 79  Patient also noted to have significant anasarca with minimally elevated potassium at 5 1  Etiology not clear at the current time-likely progression of CKD  No urgent indication for renal replacement therapy  Patient will be started on Lasix 40 milligram IV q 12 hours with albumin 25 grams q 12 hours  Nephrology consultation  CT scan of the abdomen pelvis was ordered to rule out hydronephrosis which is pending at the current time    UA showed trace amount of blood and protein  Avoid nephrotoxic agents and hypotension

## 2022-01-23 ENCOUNTER — APPOINTMENT (INPATIENT)
Dept: RADIOLOGY | Facility: HOSPITAL | Age: 62
DRG: 674 | End: 2022-01-23
Payer: MEDICARE

## 2022-01-23 PROBLEM — D64.9 ANEMIA: Status: ACTIVE | Noted: 2022-01-23

## 2022-01-23 LAB
ANION GAP SERPL CALCULATED.3IONS-SCNC: 15 MMOL/L (ref 4–13)
BASOPHILS # BLD AUTO: 0.03 THOUSANDS/ΜL (ref 0–0.1)
BASOPHILS NFR BLD AUTO: 1 % (ref 0–1)
BUN SERPL-MCNC: 70 MG/DL (ref 5–25)
C DIFF TOX GENS STL QL NAA+PROBE: NEGATIVE
CALCIUM SERPL-MCNC: 7.7 MG/DL (ref 8.3–10.1)
CAMPYLOBACTER DNA SPEC NAA+PROBE: NORMAL
CHLORIDE SERPL-SCNC: 104 MMOL/L (ref 100–108)
CO2 SERPL-SCNC: 16 MMOL/L (ref 21–32)
CREAT SERPL-MCNC: 6.41 MG/DL (ref 0.6–1.3)
CREAT UR-MCNC: 39.8 MG/DL
EOSINOPHIL # BLD AUTO: 0.14 THOUSAND/ΜL (ref 0–0.61)
EOSINOPHIL NFR BLD AUTO: 3 % (ref 0–6)
ERYTHROCYTE [DISTWIDTH] IN BLOOD BY AUTOMATED COUNT: 15.2 % (ref 11.6–15.1)
FERRITIN SERPL-MCNC: 95 NG/ML (ref 8–388)
GFR SERPL CREATININE-BSD FRML MDRD: 8 ML/MIN/1.73SQ M
GLUCOSE SERPL-MCNC: 107 MG/DL (ref 65–140)
GLUCOSE SERPL-MCNC: 119 MG/DL (ref 65–140)
GLUCOSE SERPL-MCNC: 121 MG/DL (ref 65–140)
GLUCOSE SERPL-MCNC: 77 MG/DL (ref 65–140)
GLUCOSE SERPL-MCNC: 78 MG/DL (ref 65–140)
HCT VFR BLD AUTO: 25.7 % (ref 36.5–49.3)
HGB BLD-MCNC: 7.8 G/DL (ref 12–17)
IMM GRANULOCYTES # BLD AUTO: 0.03 THOUSAND/UL (ref 0–0.2)
IMM GRANULOCYTES NFR BLD AUTO: 1 % (ref 0–2)
IRON SATN MFR SERPL: 20 % (ref 20–50)
IRON SERPL-MCNC: 32 UG/DL (ref 65–175)
LYMPHOCYTES # BLD AUTO: 1.12 THOUSANDS/ΜL (ref 0.6–4.47)
LYMPHOCYTES NFR BLD AUTO: 22 % (ref 14–44)
MCH RBC QN AUTO: 29 PG (ref 26.8–34.3)
MCHC RBC AUTO-ENTMCNC: 30.4 G/DL (ref 31.4–37.4)
MCV RBC AUTO: 96 FL (ref 82–98)
MONOCYTES # BLD AUTO: 0.81 THOUSAND/ΜL (ref 0.17–1.22)
MONOCYTES NFR BLD AUTO: 16 % (ref 4–12)
NEUTROPHILS # BLD AUTO: 3.04 THOUSANDS/ΜL (ref 1.85–7.62)
NEUTS SEG NFR BLD AUTO: 57 % (ref 43–75)
NRBC BLD AUTO-RTO: 0 /100 WBCS
PLATELET # BLD AUTO: 289 THOUSANDS/UL (ref 149–390)
PMV BLD AUTO: 9.7 FL (ref 8.9–12.7)
POTASSIUM SERPL-SCNC: 4.6 MMOL/L (ref 3.5–5.3)
PROT UR-MCNC: 427 MG/DL
PROT/CREAT UR: 10.73 MG/G{CREAT} (ref 0–0.1)
RBC # BLD AUTO: 2.69 MILLION/UL (ref 3.88–5.62)
SALMONELLA DNA SPEC QL NAA+PROBE: NORMAL
SHIGA TOXIN STX GENE SPEC NAA+PROBE: NORMAL
SHIGELLA DNA SPEC QL NAA+PROBE: NORMAL
SODIUM SERPL-SCNC: 135 MMOL/L (ref 136–145)
TIBC SERPL-MCNC: 163 UG/DL (ref 250–450)
VIT B12 SERPL-MCNC: 383 PG/ML (ref 100–900)
WBC # BLD AUTO: 5.17 THOUSAND/UL (ref 4.31–10.16)

## 2022-01-23 PROCEDURE — 83540 ASSAY OF IRON: CPT | Performed by: INTERNAL MEDICINE

## 2022-01-23 PROCEDURE — 82607 VITAMIN B-12: CPT | Performed by: INTERNAL MEDICINE

## 2022-01-23 PROCEDURE — 80048 BASIC METABOLIC PNL TOTAL CA: CPT | Performed by: INTERNAL MEDICINE

## 2022-01-23 PROCEDURE — 84156 ASSAY OF PROTEIN URINE: CPT | Performed by: INTERNAL MEDICINE

## 2022-01-23 PROCEDURE — 99233 SBSQ HOSP IP/OBS HIGH 50: CPT | Performed by: INTERNAL MEDICINE

## 2022-01-23 PROCEDURE — 82728 ASSAY OF FERRITIN: CPT | Performed by: INTERNAL MEDICINE

## 2022-01-23 PROCEDURE — G1004 CDSM NDSC: HCPCS

## 2022-01-23 PROCEDURE — 83550 IRON BINDING TEST: CPT | Performed by: INTERNAL MEDICINE

## 2022-01-23 PROCEDURE — 85025 COMPLETE CBC W/AUTO DIFF WBC: CPT | Performed by: INTERNAL MEDICINE

## 2022-01-23 PROCEDURE — 97530 THERAPEUTIC ACTIVITIES: CPT

## 2022-01-23 PROCEDURE — 97760 ORTHOTIC MGMT&TRAING 1ST ENC: CPT

## 2022-01-23 PROCEDURE — 74176 CT ABD & PELVIS W/O CONTRAST: CPT

## 2022-01-23 PROCEDURE — 82948 REAGENT STRIP/BLOOD GLUCOSE: CPT

## 2022-01-23 PROCEDURE — 82570 ASSAY OF URINE CREATININE: CPT | Performed by: INTERNAL MEDICINE

## 2022-01-23 PROCEDURE — 97163 PT EVAL HIGH COMPLEX 45 MIN: CPT

## 2022-01-23 PROCEDURE — 99223 1ST HOSP IP/OBS HIGH 75: CPT | Performed by: INTERNAL MEDICINE

## 2022-01-23 RX ORDER — SODIUM BICARBONATE 650 MG/1
1300 TABLET ORAL
Status: DISCONTINUED | OUTPATIENT
Start: 2022-01-23 | End: 2022-01-28

## 2022-01-23 RX ORDER — FUROSEMIDE 10 MG/ML
80 INJECTION INTRAMUSCULAR; INTRAVENOUS
Status: DISCONTINUED | OUTPATIENT
Start: 2022-01-23 | End: 2022-01-25

## 2022-01-23 RX ORDER — OXYCODONE HYDROCHLORIDE 5 MG/1
5 TABLET ORAL EVERY 4 HOURS PRN
Status: DISCONTINUED | OUTPATIENT
Start: 2022-01-23 | End: 2022-01-24

## 2022-01-23 RX ORDER — HYDROMORPHONE HCL/PF 1 MG/ML
1 SYRINGE (ML) INJECTION EVERY 4 HOURS PRN
Status: DISCONTINUED | OUTPATIENT
Start: 2022-01-23 | End: 2022-01-24

## 2022-01-23 RX ORDER — OXYCODONE HYDROCHLORIDE 5 MG/1
2.5 TABLET ORAL EVERY 6 HOURS PRN
Status: DISCONTINUED | OUTPATIENT
Start: 2022-01-23 | End: 2022-02-02 | Stop reason: HOSPADM

## 2022-01-23 RX ORDER — NIFEDIPINE 30 MG/1
30 TABLET, EXTENDED RELEASE ORAL 2 TIMES DAILY
Status: DISCONTINUED | OUTPATIENT
Start: 2022-01-23 | End: 2022-02-02 | Stop reason: HOSPADM

## 2022-01-23 RX ADMIN — HYDROMORPHONE HYDROCHLORIDE 1 MG: 1 INJECTION, SOLUTION INTRAMUSCULAR; INTRAVENOUS; SUBCUTANEOUS at 10:46

## 2022-01-23 RX ADMIN — CARVEDILOL 12.5 MG: 12.5 TABLET, FILM COATED ORAL at 08:25

## 2022-01-23 RX ADMIN — HEPARIN SODIUM 5000 UNITS: 5000 INJECTION INTRAVENOUS; SUBCUTANEOUS at 21:43

## 2022-01-23 RX ADMIN — ALBUMIN (HUMAN) 25 G: 0.25 INJECTION, SOLUTION INTRAVENOUS at 21:42

## 2022-01-23 RX ADMIN — CLOPIDOGREL BISULFATE 75 MG: 75 TABLET ORAL at 08:24

## 2022-01-23 RX ADMIN — PIOGLITAZONE HYDROCHLORIDE 15 MG: 15 TABLET ORAL at 08:26

## 2022-01-23 RX ADMIN — NIFEDIPINE 30 MG: 30 TABLET, FILM COATED, EXTENDED RELEASE ORAL at 17:24

## 2022-01-23 RX ADMIN — LIDOCAINE 5% 1 PATCH: 700 PATCH TOPICAL at 08:25

## 2022-01-23 RX ADMIN — ALLOPURINOL 100 MG: 100 TABLET ORAL at 08:24

## 2022-01-23 RX ADMIN — HYDROMORPHONE HYDROCHLORIDE 1 MG: 1 INJECTION, SOLUTION INTRAMUSCULAR; INTRAVENOUS; SUBCUTANEOUS at 06:24

## 2022-01-23 RX ADMIN — HEPARIN SODIUM 5000 UNITS: 5000 INJECTION INTRAVENOUS; SUBCUTANEOUS at 06:09

## 2022-01-23 RX ADMIN — OXYCODONE HYDROCHLORIDE 5 MG: 5 TABLET ORAL at 19:33

## 2022-01-23 RX ADMIN — ATORVASTATIN CALCIUM 80 MG: 80 TABLET, FILM COATED ORAL at 08:25

## 2022-01-23 RX ADMIN — NIFEDIPINE 30 MG: 30 TABLET, FILM COATED, EXTENDED RELEASE ORAL at 08:25

## 2022-01-23 RX ADMIN — FUROSEMIDE 40 MG: 10 INJECTION, SOLUTION INTRAMUSCULAR; INTRAVENOUS at 09:12

## 2022-01-23 RX ADMIN — CARVEDILOL 12.5 MG: 12.5 TABLET, FILM COATED ORAL at 17:24

## 2022-01-23 RX ADMIN — ALBUMIN (HUMAN) 25 G: 0.25 INJECTION, SOLUTION INTRAVENOUS at 08:27

## 2022-01-23 RX ADMIN — HYDROMORPHONE HYDROCHLORIDE 1 MG: 1 INJECTION, SOLUTION INTRAMUSCULAR; INTRAVENOUS; SUBCUTANEOUS at 21:42

## 2022-01-23 RX ADMIN — SODIUM BICARBONATE 650 MG TABLET 1300 MG: at 17:22

## 2022-01-23 RX ADMIN — FUROSEMIDE 80 MG: 10 INJECTION, SOLUTION INTRAMUSCULAR; INTRAVENOUS at 17:24

## 2022-01-23 RX ADMIN — HYDROMORPHONE HYDROCHLORIDE 1 MG: 1 INJECTION, SOLUTION INTRAMUSCULAR; INTRAVENOUS; SUBCUTANEOUS at 15:00

## 2022-01-23 RX ADMIN — HEPARIN SODIUM 5000 UNITS: 5000 INJECTION INTRAVENOUS; SUBCUTANEOUS at 13:01

## 2022-01-23 NOTE — ASSESSMENT & PLAN NOTE
No results found for: HGBA1C    Recent Labs     01/22/22  2142 01/23/22  0708 01/23/22  1136 01/23/22  1546   POCGLU 104 78 107 121       Blood Sugar Average: Last 72 hrs:  (P) 114 875   Patient is on linagliptin and Actos at home  Hold oral antidiabetic agents  Continue Humalog sliding scale with Accu-Cheks q a c  And HS    Check hemoglobin A1c

## 2022-01-23 NOTE — ASSESSMENT & PLAN NOTE
Results from last 7 days   Lab Units 01/23/22  0504 01/22/22  0804 01/21/22  1218   HEMOGLOBIN g/dL 7 8* 8 7* 10 0*   HEMATOCRIT % 25 7* 28 0* 32 2*   MCV fL 96 94 93   Patient's hemoglobin dropped from 10-7 8  Normal MCV  No evidence of active bleeding  Check iron panel, stool for occult blood, vitamin B12 level

## 2022-01-23 NOTE — PHYSICAL THERAPY NOTE
PT EVALUATION       01/23/22 1440   Note Type   Note type Evaluation   Pain Assessment   Pain Assessment Tool 0-10   Pain Score 5   Pain Location/Orientation Location: Back   Restrictions/Precautions   Braces or Orthoses TLSO   Other Precautions Contact/isolation;Pain; Fall Risk   Home Living   Type of [address] One level  ([address] [place])   Home Equipment   (none)   Prior Function   Level of [place] Independent with ADLs and functional mobility   Lives With Alone   ADL Assistance Independent   Vocational Unemployed   General   Additional Pertinent History Pt admitted wtih acute on chronic kidney injury and back pain after a fall 1 week ago with resultant T10 fx  TLSO ordered  Cognition   Overall Cognitive Status WFL   Arousal/Participation Alert   Orientation Level Oriented X4   Following Commands Follows all commands and directions without difficulty   Subjective   Subjective "I'm having a bad day, there's no point in living"   RLE Assessment   RLE Assessment   (ROM WFL, MMT 4/5)   LLE Assessment   LLE Assessment   (ROM WFL, MMT 4/5)   Light Touch   RLE Light Touch Impaired   LLE Light Touch Impaired   Transfers   Sit to Stand 4  Minimal assistance   Stand to Sit 4  Minimal assistance   Stand pivot 4  Minimal assistance   Ambulation/Elevation   Gait pattern   (flexed posture despite TLSO)   Gait Assistance 4  Minimal assist   Assistive Device   (TLSO)   Distance 50 feet    Balance   Static Sitting Good   Dynamic Sitting Fair +   Static Standing Fair   Dynamic Standing Fair   Ambulatory Fair -   Activity Tolerance   Activity Tolerance Patient limited by fatigue;Patient limited by pain   Assessment   Prognosis Good   Problem List Decreased strength;Decreased range of motion;Decreased endurance; Impaired balance;Decreased mobility;Pain;Orthopedic restrictions   Assessment Patient seen for Physical Therapy evaluation   Patient admitted with Acute kidney injury superimposed on chronic kidney disease ([facility] [address] )  Comorbidities affecting patient's physical performance include: DM, htn, back pain, charcot foot  Personal factors affecting patient at time of initial evaluation include: stairs to enter home, inability to navigate community distances, inability to navigate level surfaces without external assistance and depression  Prior to admission, patient was independent with functional mobility without assistive device and independent with ADLS  Please find objective findings from Physical Therapy assessment regarding body systems outlined above with impairments and limitations including weakness, impaired balance, decreased endurance, pain, decreased activity tolerance, decreased functional mobility tolerance, fall risk and orthopedic restrictions  The Barthel Index was used as a functional outcome tool presenting with a score of 65 today indicating moderate limitations of functional mobility and ADLS  Patient's clinical presentation is currently unstable/unpredictable as seen in patient's presentation of changing level of pain, increased fall risk, new onset of impairment of functional mobility, decreased endurance and new onset of weakness  Pt would benefit from continued Physical Therapy treatment to address deficits as defined above and maximize level of functional mobility  As demonstrated by objective findings, the assigned level of complexity for this evaluation is high  The patient's -[facility] Basic Mobility Inpatient Short Form Raw Score is 18  A Raw score of greater than 16 suggests the patient may benefit from discharge to home     Goals   Patient Goals "feel better"   STG Expiration Date 01/30/22   Short Term Goal #1 independent log rolling to protect T10 fx, independent transfers, independent ambulation with a walker 150 feet indoor level surfaces, independent to don/doff TLSO, independent  with bending and lifting precautions   LTG Expiration Date 02/06/22   Long Term Goal #1 no falls, pt will wear his TLSO 90% of the time, independent ambulation outdoor surfaces with/without a walker and wearing the TLSO 150 feet, improve standing dynamic balance to at least fair+ to decrease fall risk  Plan   Treatment/Interventions ADL retraining;Functional transfer training;LE strengthening/ROM; Elevations; Therapeutic exercise; Endurance training;Gait training;Bed mobility; Equipment eval/education;Patient/family training   PT Frequency Other (Comment)  (5w)   Recommendation   PT Discharge Recommendation Home with home health rehabilitation   [facility]   Change/add to [employer]? No   AM-PAC Basic Mobility Inpatient   Turning in Bed Without Bedrails 3   Lying on Back to Sitting on Edge of Flat Bed 3   Moving Bed to Chair 3   Standing Up From Chair 3   Walk in Room 3   Climb 3-5 Stairs 3   Basic Mobility Inpatient Raw Score 18   Basic Mobility Standardized Score 41 05   Highest Level Of Mobility   JH-HLM Goal 6: Walk 10 steps or more   JH-HLM Highest Level of Mobility 7: Walk 25 feet or more   JH-HLM Goal Achieved Yes   Barthel Index   Feeding 10   Bathing 0   Grooming Score 0   Dressing Score 5   Bladder Score 10   Bowels Score 10   Toilet Use Score 5   Transfers (Bed/Chair) Score 10   Mobility (Level Surface) Score 10   Stairs Score 5   Barthel Index Score 65   Additional Treatment Session   Start Time 1410   End Time 1440   Treatment Assessment TLSO ordered and fitted to pt  Backpack TLSO applied with extender for waist   Pt instructed that brace should be worn when sitting upright/OOB but can come off when lying in bed  Pt instructed in how to don and doff the brace  Pt feels that his whole body is swollen so the brace will fit better in the coming days as he is looses some water weight  Plan to check the brace fit next day and replace with a traditional TLSO if needed for correct sizing for pt's height and abdominal girth   Gait training with a rolling walker in the hallway x 150 feet with supervision while wearing the TLSO  Pt fatigues easily but demonstrates imrpoved endurance and quality of gait with the walker  Pt reports he feels depressed, encouragement given  Pt seemed brighter at the end of the PT session  End of Consult   Patient Position at End of Consult All needs within reach; Seated edge of bed   Licensure   [place] License Number  [facility] PT [id number]

## 2022-01-23 NOTE — CONSULTS
Consultation - Nephrology   Dewayne Hernandez 57 y o  male MRN: [id number]  Unit/Bed#: [address] [place] [address] Encounter: [id number]    ASSESSMENT and PLAN:  Acute kidney injury, POA on underlying chronic kidney disease stage IIIB  -Baseline creatinine:  Reviewed labs from Care everywhere and patient had creatinine around 4 5-4 7 in November 2021, July 2021 was 3 31, in February 2020 was to 1 9  He mentions he was told to do kidney biopsy during hospital admission in November but he cannot tolerate MRI/CT scan and they were not able to do ultrasound-guided kidney biopsy and so he did not do the biopsy  He was admitted to the hospital at that time for lower extremity wounds and was treated with antibiotics  -previous workup showed SPEP UPEP negative, chronic hepatitis panel was negative, C3-C4 not low, NURIA was negative  -Admission creatinine:  5 79 mg/dl  - Work up:   · UA with microscopy:  2+ protein, 0-1 RBC as well as WBC  · Imaging:  CT with no hydronephrosis  -Etiology:  Acute kidney injury likely due to cardiorenal syndrome, use of NSAIDs and progression of CKD  -Hospital Course:  Renal function has worsened further  -Plan:   · Creatinine has trended up to 6 4 today without any uremic symptoms, no urgent indication for dialysis  Will continue diuresis with IV Lasix as patient has significant fluid overload  Recommend to avoid all NSAIDs  Will check ANCA panel as well as anti-GBM antibodies  · May need to consider kidney biopsy if renal function does not improve  May also need initiation of dialysis if renal function continue to worsen, this was discussed with patient  Will monitor response to diuresis  · Recommend holding off on Plavix in case we have to do kidney biopsy during this hospital stay  · Avoid nephrotoxins and dose all medications per EGFR  · Avoid hypotension                                              Fluid overload  -has significant lower extremity edema and scrotal edema  -quantify proteinuria  -increase Lasix to 80 mg IV t i d , may need to consider Lasix drip if no improvement  Recommend holding off on Actos which can cause fluid overload    BP/hypertension  -blood pressure is currently acceptable  Continue current medications with hold parameters, avoid hypotension specially with aggressive diuresis  Metabolic acidosis:  Bicarb level 16, started on oral sodium bicarbonate tablets 1300 mg t i d  Anemia:  Hemoglobin is low at 7 8, check iron panel  Likely anemia of chronic kidney disease    Diarrhea:  Workup per primary team  Diabetes mellitus type 2:  Management per primary team      Discussed with primary team        HISTORY OF PRESENT ILLNESS:  Requesting Physician: Salazar Pichardo MD  Reason for Consult:  Acute kidney injury    Xochitl Storey is a 57 y o  male with known history of chronic kidney disease with gradual worsening of renal function, diabetes mellitus, hypertension who was admitted to admitted to [facility] after presenting with complain of back pain status post fall a week ago  Also mentions he has lower extremity edema about a week prior to the fall and after fall it has worsened and has scrotal edema but no shortness of breath  As per patient after Kindra was taking Advil for 3-4 days  Was admitted to [address] [place] [address] in November, reviewed note, had acute kidney injury at that time possibly from progression of diabetic nephropathy, was found to have normal NURIA, C3-C4 and was plan for ultrasound-guided kidney biopsy which was not possible due to his abdominal girth and so he was discharged and told to follow-up as an outpatient with Dr Chino Simmons  Was taken of losartan and chlorthalidone during that hospital admission      On admission here creatinine was 5 7, worsened to 6 4    PAST MEDICAL HISTORY:  Past Medical History:   Diagnosis Date    CKD     Diabetes mellitus ([address] )     Hypertension     [address] toe amput[facility] [address] [address] )     TIA (transient ischemic attack)        PAST SURGICAL HISTORY:  History reviewed  No pertinent surgical history  SOCIAL HISTORY:  Social History     Substance and Sexual Activity   Alcohol Use Not Currently    Alcohol/week: 0 0 standard drinks    Comment: 0     Social History     Substance and Sexual Activity   Drug Use Never     Social History     Tobacco Use   Smoking Status Never Smoker   Smokeless Tobacco Never Used       FAMILY HISTORY:  History reviewed  No pertinent family history      ALLERGIES:  No Known Allergies    MEDICATIONS:    Current Facility-Administered Medications:     acetaminophen (TYLENOL) tablet 650 mg, 650 mg, Oral, Q6H PRN, Spencer Puga MD    albumin human (FLEXBUMIN) 25 % injection 25 g, 25 g, Intravenous, Q12H [address], Spencer Puga MD, 25 g at 01/23/22 0827    allopurinol (ZYLOPRIM) tablet 100 mg, 100 mg, Oral, Daily, Spencer Puga MD, 100 mg at 01/23/22 0824    atorvastatin (LIPITOR) tablet 80 mg, 80 mg, Oral, Daily, Spencer Puga MD, 80 mg at 01/23/22 0825    carvedilol (COREG) tablet 12 5 mg, 12 5 mg, Oral, BID, Spencer Puga MD, 12 5 mg at 01/23/22 0825    clopidogrel (PLAVIX) tablet 75 mg, 75 mg, Oral, Daily, Spencer Puga MD, 75 mg at 01/23/22 0824    furosemide (LASIX) injection 40 mg, 40 mg, Intravenous, BID (diuretic), Spencer Puga MD, 40 mg at 01/23/22 0912    heparin (porcine) subcutaneous injection 5,000 Units, 5,000 Units, Subcutaneous, Q8H [address], 5,000 Units at 01/23/22 1301 **AND** Platelet count, , , Once, Spencer Puga MD    HYDROmorphone (DILAUDID) injection 1 mg, 1 mg, Intravenous, Once, Spencer Puga MD    HYDROmorphone (DILAUDID) injection 1 mg, 1 mg, Intravenous, Q4H PRN, Spencer Puga MD, 1 mg at 01/23/22 1046    insulin lispro (HumaLOG) 100 units/mL subcutaneous injection 1-5 Units, 1-5 Units, Subcutaneous, HS, Spencer Puga MD    insulin lispro (HumaLOG) 100 units/mL subcutaneous injection 2-12 Units, 2-12 Units, Subcutaneous, TID AC, 2 Units at 01/21/22 1744 **AND** Fingerstick Glucose (POCT), , , TID AC, Joel Dalton MD    lidocaine (LIDODERM) 5 % patch 1 patch, 1 patch, Topical, Daily, Mari Fuller MD, 1 patch at 01/23/22 0825    NIFEdipine (PROCARDIA XL) 24 hr tablet 30 mg, 30 mg, Oral, BID, Xenia Ruiz MD, 30 mg at 01/23/22 0825    ondansetron (ZOFRAN) injection 4 mg, 4 mg, Intravenous, Q6H PRN, Mari Fuller MD    pioglitazone (ACTOS) tablet 15 mg, 15 mg, Oral, Daily, Mari Fuller MD, 15 mg at 01/23/22 5542    REVIEW OF SYSTEMS:   Review of Systems   Constitutional: Negative for chills and fever  HENT: Negative for ear pain and sore throat  Eyes: Negative for pain and visual disturbance  Respiratory: Negative for cough and shortness of breath  Cardiovascular: Positive for leg swelling  Negative for chest pain and palpitations  Gastrointestinal: Negative for abdominal pain and vomiting  Genitourinary: Negative for dysuria and hematuria  Musculoskeletal: Positive for back pain  Negative for arthralgias  Skin: Negative for color change and rash  Neurological: Negative for seizures and syncope  All other systems reviewed and are negative  All the systems were reviewed and were negative except as documented on the HPI      PHYSICAL EXAM:  Current Weight: Weight - Scale: 130 kg (286 lb 9 6 oz)  First Weight: Weight - Scale: 122 kg (270 lb)  Vitals:    01/22/22 1549 01/22/22 2000 01/22/22 2243 01/23/22 0738   BP: 99/59 123/74 144/66 147/69   BP Location:  Left arm  Left arm   Pulse:  76  82   Resp: 18 20 18 20   Temp: 98 3 °F (36 8 °C) 99 °F (37 2 °C) 98 9 °F (37 2 °C) 98 1 °F (36 7 °C)   TempSrc:  Oral  Oral   SpO2:    99%   Weight:       Height:           Intake/Output Summary (Last 24 hours) at 1/23/2022 1403  Last data filed at 1/23/2022 1050  Gross per 24 hour   Intake --   Output 1250 ml   Net -1250 ml     Physical Exam  Constitutional: General: He is not in acute distress  Appearance: He is well-developed  He is not diaphoretic  HENT:      Head: Normocephalic and atraumatic  Mouth/Throat:      Mouth: Mucous membranes are moist    Eyes:      General: No scleral icterus  Conjunctiva/sclera: Conjunctivae normal       Pupils: Pupils are equal, round, and reactive to light  Neck:      Thyroid: No thyromegaly  Cardiovascular:      Rate and Rhythm: Normal rate and regular rhythm  Heart sounds: Normal heart sounds  No murmur heard  No friction rub  Pulmonary:      Effort: Pulmonary effort is normal  No respiratory distress  Breath sounds: Normal breath sounds  No wheezing or rales  Abdominal:      General: Bowel sounds are normal  There is no distension  Palpations: Abdomen is soft  Tenderness: There is no abdominal tenderness  Musculoskeletal:         General: No deformity  Cervical back: Neck supple  Right lower leg: Edema (2+ lower extremity edema) present  Left lower leg: Edema (Has 2+ lower extremity edema) present  Lymphadenopathy:      Cervical: No cervical adenopathy  Skin:     Coloration: Skin is not pale  Nails: There is no clubbing  Neurological:      Mental Status: He is alert and oriented to person, place, and time  He is not disoriented  Psychiatric:         Mood and Affect: Mood normal  Mood is not anxious  Affect is not inappropriate  Behavior: Behavior normal          Thought Content:  Thought content normal            Invasive Devices:        Lab Results:   Results from last 7 days   Lab Units 01/23/22  0504 01/22/22  0804 01/21/22  1218   WBC Thousand/uL 5 17 6 53 8 38   HEMOGLOBIN g/dL 7 8* 8 7* 10 0*   HEMATOCRIT % 25 7* 28 0* 32 2*   PLATELETS Thousands/uL 289 302 347   POTASSIUM mmol/L 4 6 4 5 5 1   CHLORIDE mmol/L 104 104 108   CO2 mmol/L 16* 17* 19*   BUN mg/dL 70* 70* 63*   CREATININE mg/dL 6 41* 5 79* 5 79*   CALCIUM mg/dL 7 7* 7 5* 7 9*   MAGNESIUM mg/dL  --   --  2 1   ALK PHOS U/L  --   --  104   ALT U/L  --   --  32   AST U/L  --   --  37       Other Studies:   CT abdomen  KIDNEYS/URETERS:  No hydronephrosis or urinary tract calculus  One or more sharply circumscribed subcentimeter renal hypodensities are present, too small to accurately characterize, and statistically most likely benign findings  According to recent   literature (Radiology 2019) no further workup of these findings is recommended    Minimally displaced, oblique fracture involving the T10 vertebral body  Fracture line does not extend into the posterior elements  No bony retropulsion  Follow-up neurosurgical evaluation recommended      Portions of the record may have been created with voice recognition software  Occasional wrong word or "sound a like" substitutions may have occurred due to the inherent limitations of voice recognition software  Read the chart carefully and recognize, using context, where substitutions have occurred  If you have any questions, please contact the dictating provider

## 2022-01-23 NOTE — ASSESSMENT & PLAN NOTE
Patient sustained a fall about 1 week ago slipping on a rug  Patient has been having severe lower back pain since then  Denies any bowel or bladder incontinence, tingling or numbness in the legs  Patient received fentanyl and 2 dose of Dilaudid in the ED with persistent pain  Lumbar x-ray showed no fractures or malalignment with possible ankylosing spondylitis  CT scan of the abdomen pelvis showed minimally displaced oblique fracture involving the T10 vertebral body without extending into the posterior elements without any real bony retropulsion  Discussed with Neurosurgery Dr Taras [place] with pain management  Will get thoracic spine x-ray  Patient ordered for Lidoderm patch and Dilaudid p r n   For breakthrough pain with improved pain  Outpatient follow-up with Neurosurgery

## 2022-01-23 NOTE — ASSESSMENT & PLAN NOTE
Lab Results   Component Value Date    EGFR 8 01/23/2022    EGFR 9 01/22/2022    EGFR 9 01/21/2022    CREATININE 6 41 (H) 01/23/2022    CREATININE 5 79 (H) 01/22/2022    CREATININE 5 79 (H) 01/21/2022   Patient has history of CKD stage IV  Baseline creatinine appears to be around 4 3-4 7  Admission creatinine was 5 79 which increased to 6 4  Patient also noted to have significant anasarca with minimally elevated potassium at 5 1  Etiology suspected to be acute kidney injury related to possible cardiorenal syndrome, use of NSAIDs are progression of CKD  No urgent indication for renal replacement therapy  Patient was initially started on Lasix 40 milligram IV q 12 hours with albumin 25 grams q 12 hours  Patient had good urine output but creatinine got worse  Nephrology input appreciated  Lasix was increased to 80 milligram Q 8 hours  Patient to continue albumin 25 grams q 2 hours  CT scan of the abdomen pelvis was ordered to rule out hydronephrosis which is pending at the current time  UA showed trace amount of blood and protein  Avoid nephrotoxic agents and hypotension  Patient's previous SPEP/UPEP were negative and chronic hepatitis panel was negative  C3-C4 not low and NURIA was negative  Patient might need kidney biopsy if creatinine does not improve  ANCA panel and anti GBM antibodies were ordered by Nephrology  CT scan showed no evidence of hydronephrosis  Actos was discontinued as it can cause fluid retention

## 2022-01-23 NOTE — PLAN OF CARE
Problem: Prexisting or High Potential for Compromised Skin Integrity  Goal: Skin integrity is maintained or improved  Description: INTERVENTIONS:  - Identify patients at risk for skin breakdown  - Assess and monitor skin integrity  - Assess and monitor nutrition and hydration status  - Monitor labs   - Assess for incontinence   - Turn and reposition patient  - Assist with mobility/ambulation  - Relieve pressure over bony prominences  - Avoid friction and shearing  - Provide appropriate hygiene as needed including keeping skin clean and dry  - Evaluate need for skin moisturizer/barrier cream  - Collaborate with interdisciplinary team   - Patient/family teaching  - Consider wound care consult   Outcome: Progressing     Problem: MOBILITY - ADULT  Goal: Maintain or return to baseline ADL function  Description: INTERVENTIONS:  -  Assess patient's ability to carry out ADLs; assess patient's baseline for ADL function and identify physical deficits which impact ability to perform ADLs (bathing, care of mouth/teeth, toileting, grooming, dressing, etc )  - Assess/evaluate cause of self-care deficits   - Assess range of motion  - Assess patient's mobility; develop plan if impaired  - Assess patient's need for assistive devices and provide as appropriate  - Encourage maximum independence but intervene and supervise when necessary  - Involve family in performance of ADLs  - Assess for home care needs following discharge   - Consider OT consult to assist with ADL evaluation and planning for discharge  - Provide patient education as appropriate  Outcome: Progressing  Goal: Maintains/Returns to pre admission functional level  Description: INTERVENTIONS:  - Perform BMAT or MOVE assessment daily    - Set and communicate daily mobility goal to care team and patient/family/caregiver     - Collaborate with rehabilitation services on mobility goals if consulted  - Stand patient 4 times a day  - Ambulate patient 4 times a day  - Out of bed to chair 2 times a day   - Out of bed for meals 3  Problem: Potential for Falls  Goal: Patient will remain free of falls  Description: INTERVENTIONS:  - Educate patient/family on patient safety including physical limitations  - Instruct patient to call for assistance with activity   - Consult OT/PT to assist with strengthening/mobility   - Keep Call bell within reach  - Keep bed low and locked with side rails adjusted as appropriate  - Keep care items and personal belongings within reach  - Initiate and maintain comfort rounds  - Make Fall Risk Sign visible to staff  - Offer Toileting every 2 Hours, in advance of need  - Initiate/Maintain bed alarm  - Obtain necessary fall risk management equipment:  - Apply yellow socks and bracelet for high fall risk patients  - Consider moving patient to room near nurses station  Outcome: Progressing   times a day  - Out of bed for toileting  - Record patient progress and toleration of activity level   Outcome: Progressing

## 2022-01-23 NOTE — PROGRESS NOTES
[address] 44  Progress Note - Lukas Subramanian 1960, 57 y o  male MRN: [id number]  Unit/Bed#: 32263 Marilyn Kimball Encounter: [id number]  Primary Care Provider: Pamela Licea MD   Date and time admitted to hospital: 1/21/2022 11:44 AM    * Acute kidney injury superimposed on chronic kidney disease [facility])  Assessment & Plan  Lab Results   Component Value Date    EGFR 8 01/23/2022    EGFR 9 01/22/2022    EGFR 9 01/21/2022    CREATININE 6 41 (H) 01/23/2022    CREATININE 5 79 (H) 01/22/2022    CREATININE 5 79 (H) 01/21/2022   Patient has history of CKD stage IV  Baseline creatinine appears to be around 4 3-4 7  Admission creatinine was 5 79 which increased to 6 4  Patient also noted to have significant anasarca with minimally elevated potassium at 5 1  Etiology suspected to be acute kidney injury related to possible cardiorenal syndrome, use of NSAIDs are progression of CKD  No urgent indication for renal replacement therapy  Patient was initially started on Lasix 40 milligram IV q 12 hours with albumin 25 grams q 12 hours  Patient had good urine output but creatinine got worse  Nephrology input appreciated  Lasix was increased to 80 milligram Q 8 hours  Patient to continue albumin 25 grams q 2 hours  CT scan of the abdomen pelvis was ordered to rule out hydronephrosis which is pending at the current time  UA showed trace amount of blood and protein  Avoid nephrotoxic agents and hypotension  Patient's previous SPEP/UPEP were negative and chronic hepatitis panel was negative  C3-C4 not low and NURIA was negative  Patient might need kidney biopsy if creatinine does not improve  ANCA panel and anti GBM antibodies were ordered by Nephrology  CT scan showed no evidence of hydronephrosis  Actos was discontinued as it can cause fluid retention        T10 vertebral fracture [facility])  Assessment & Plan  Patient sustained a fall about 1 week ago slipping on a rug  Patient has been having severe lower back pain since then  Denies any bowel or bladder incontinence, tingling or numbness in the legs  Patient received fentanyl and 2 dose of Dilaudid in the ED with persistent pain  Lumbar x-ray showed no fractures or malalignment with possible ankylosing spondylitis  CT scan of the abdomen pelvis showed minimally displaced oblique fracture involving the T10 vertebral body without extending into the posterior elements without any real bony retropulsion  Discussed with Neurosurgery Dr Filipe Hermosillo with pain management  Will get thoracic spine x-ray  Patient ordered for Lidoderm patch and Dilaudid p r n  For breakthrough pain with improved pain  Outpatient follow-up with Neurosurgery    Anemia  Assessment & Plan  Results from last 7 days   Lab Units 01/23/22  0504 01/22/22  0804 01/21/22  1218   HEMOGLOBIN g/dL 7 8* 8 7* 10 0*   HEMATOCRIT % 25 7* 28 0* 32 2*   MCV fL 96 94 93   Patient's hemoglobin dropped from 10-7 8  Normal MCV  No evidence of active bleeding  Check iron panel, stool for occult blood, vitamin B12 level    Diarrhea  Assessment & Plan  Patient continues to complain of some diarrhea  Stool for bacterial panel and C diff toxin were negative  Abdominal exam is benign  CT scan of the abdomen pelvis showed no acute pathology    Hypertension  Assessment & Plan  Continue nifedipine 30 milligram p o  B i d  And Coreg 12 5 milligram p o  B i d  Avoid hypotension    Diabetes mellitus [facility])  Assessment & Plan  No results found for: HGBA1C    Recent Labs     01/22/22  2142 01/23/22  0708 01/23/22  1136 01/23/22  1546   POCGLU 104 78 107 121       Blood Sugar Average: Last 72 hrs:  (P) 114 875   Patient is on linagliptin and Actos at home  Hold oral antidiabetic agents  Continue Humalog sliding scale with Accu-Cheks q a c  And HS  Check hemoglobin A1c        VTE Pharmacologic Prophylaxis:   High Risk (Score >/= 5) - Pharmacological DVT Prophylaxis Ordered: heparin   Sequential Compression Devices Ordered  Patient Centered Rounds: I performed bedside rounds with nursing staff today  Discussions with Specialists or Other Care Team Provider: Dr Kaci Ariza    Education and Discussions with Family / Patient: yes    Time Spent for Care: 45 minutes  More than 50% of total time spent on counseling and coordination of care as described above  Current Length of Stay: 2 day(s)  Current Patient Status: Inpatient   Certification Statement: The patient will continue to require additional inpatient hospital stay due to Acute kidney injury, T10 compression fracture, anemia  Discharge Plan: Anticipate discharge in >72 hrs to Pending course    Code Status: Level 1 - Full Code    Subjective:   Patient still complaining of back pain  Patient has been urinating well and has improved scrotal swelling  Still having some diarrhea  Denies any abdominal pain    Objective:     Vitals:   Temp (24hrs), Av 5 °F (36 9 °C), Min:98 1 °F (36 7 °C), Max:99 °F (37 2 °C)    Temp:  [98 1 °F (36 7 °C)-99 °F (37 2 °C)] 98 1 °F (36 7 °C)  HR:  [76-82] 81  Resp:  [18-20] 18  BP: (123-148)/(66-74) 148/70  SpO2:  [98 %-99 %] 98 %  Body mass index is 37 81 kg/m²  Input and Output Summary (last 24 hours): Intake/Output Summary (Last 24 hours) at 2022 1830  Last data filed at 2022 1050  Gross per 24 hour   Intake --   Output 1250 ml   Net -1250 ml       Physical Exam:   Physical Exam  Constitutional:       Appearance: Normal appearance  HENT:      Head: Normocephalic and atraumatic  Eyes:      Extraocular Movements: Extraocular movements intact  Pupils: Pupils are equal, round, and reactive to light  Cardiovascular:      Rate and Rhythm: Normal rate and regular rhythm  Heart sounds: No murmur heard  No gallop  Pulmonary:      Effort: Pulmonary effort is normal       Breath sounds: Normal breath sounds  Abdominal:      General: Bowel sounds are normal       Palpations: Abdomen is soft        Tenderness: There is no abdominal tenderness  Musculoskeletal:         General: No swelling or deformity  Normal range of motion  Cervical back: Normal range of motion and neck supple  Right lower leg: Edema present  Left lower leg: Edema present  Comments: Bilateral +3 pedal edema extending up to the thighs   Skin:     General: Skin is warm and dry  Neurological:      General: No focal deficit present  Mental Status: He is alert  Additional Data:     Labs:  Results from last 7 days   Lab Units 01/23/22  0504   WBC Thousand/uL 5 17   HEMOGLOBIN g/dL 7 8*   HEMATOCRIT % 25 7*   PLATELETS Thousands/uL 289   NEUTROS PCT % 57   LYMPHS PCT % 22   MONOS PCT % 16*   EOS PCT % 3     Results from last 7 days   Lab Units 01/23/22  0504 01/22/22  0804 01/21/22  1218   SODIUM mmol/L 135*   < > 140   POTASSIUM mmol/L 4 6   < > 5 1   CHLORIDE mmol/L 104   < > 108   CO2 mmol/L 16*   < > 19*   BUN mg/dL 70*   < > 63*   CREATININE mg/dL 6 41*   < > 5 79*   ANION GAP mmol/L 15*   < > 13   CALCIUM mg/dL 7 7*   < > 7 9*   ALBUMIN g/dL  --   --  2 3*   TOTAL BILIRUBIN mg/dL  --   --  0 19*   ALK PHOS U/L  --   --  104   ALT U/L  --   --  32   AST U/L  --   --  37   GLUCOSE RANDOM mg/dL 77   < > 149*    < > = values in this interval not displayed           Results from last 7 days   Lab Units 01/23/22  1546 01/23/22  1136 01/23/22  0708 01/22/22  2142 01/22/22  1142 01/22/22  0759 01/21/22  2034 01/21/22  1732   POC GLUCOSE mg/dl 121 107 78 104 117 88 128 176*               Lines/Drains:  Invasive Devices  Report    Peripheral Intravenous Line            Peripheral IV 01/21/22 Right Antecubital 2 days                      Imaging: Reviewed radiology reports from this admission including: chest xray    Recent Cultures (last 7 days):   Results from last 7 days   Lab Units 01/22/22  1831   C DIFF TOXIN B BY PCR  Negative       Last 24 Hours Medication List:   Current Facility-Administered Medications   Medication Dose Route Frequency Provider Last Rate    acetaminophen  650 mg Oral Q6H PRN Doris Castillo MD      albumin human  25 g Intravenous Q12H [facility] Doris Castillo MD      allopurinol  100 mg Oral Daily Doris Castillo MD      atorvastatin  80 mg Oral Daily Doris Castillo MD      carvedilol  12 5 mg Oral BID Doris Castillo MD      clopidogrel  75 mg Oral Daily Joel Dalton MD      furosemide  80 mg Intravenous TID (diuretic) Veronica Espana MD      heparin (porcine)  5,000 Units Subcutaneous Counts include [facility] beds at the [facility] Doris Castillo MD      HYDROmorphone  1 mg Intravenous Once Doris Castillo MD      HYDROmorphone  1 mg Intravenous Q4H PRN Doris Castillo MD      insulin lispro  1-5 Units Subcutaneous HS Doris Castillo MD      insulin lispro  2-12 Units Subcutaneous TID AC Doris Castillo MD      lidocaine  1 patch Topical Daily Doris Castillo MD      NIFEdipine  30 mg Oral BID Veronica Espana MD      ondansetron  4 mg Intravenous Q6H PRN Doris Castillo MD      oxyCODONE  2 5 mg Oral Q6H PRN Doris Castillo MD      oxyCODONE  5 mg Oral Q4H PRN Doris Castillo MD      sodium bicarbonate  1,300 mg Oral TID after meals Veronica Espana MD          Today, Patient Was Seen By: Doris Castillo MD    **Please Note: This note may have been constructed using a voice recognition system  **

## 2022-01-23 NOTE — ASSESSMENT & PLAN NOTE
Patient continues to complain of some diarrhea    Stool for bacterial panel and C diff toxin were negative  Abdominal exam is benign  CT scan of the abdomen pelvis showed no acute pathology

## 2022-01-24 ENCOUNTER — APPOINTMENT (INPATIENT)
Dept: RADIOLOGY | Facility: HOSPITAL | Age: 62
DRG: 674 | End: 2022-01-24
Payer: MEDICARE

## 2022-01-24 PROBLEM — R19.7 DIARRHEA: Status: RESOLVED | Noted: 2022-01-22 | Resolved: 2022-01-24

## 2022-01-24 LAB
ANION GAP SERPL CALCULATED.3IONS-SCNC: 16 MMOL/L (ref 4–13)
ATRIAL RATE: 85 BPM
ATRIAL RATE: 86 BPM
BUN SERPL-MCNC: 76 MG/DL (ref 5–25)
CALCIUM SERPL-MCNC: 7.6 MG/DL (ref 8.3–10.1)
CHLORIDE SERPL-SCNC: 102 MMOL/L (ref 100–108)
CO2 SERPL-SCNC: 17 MMOL/L (ref 21–32)
CREAT SERPL-MCNC: 6.93 MG/DL (ref 0.6–1.3)
ERYTHROCYTE [DISTWIDTH] IN BLOOD BY AUTOMATED COUNT: 15.2 % (ref 11.6–15.1)
EST. AVERAGE GLUCOSE BLD GHB EST-MCNC: 143 MG/DL
FERRITIN SERPL-MCNC: 64 NG/ML (ref 8–388)
GFR SERPL CREATININE-BSD FRML MDRD: 7 ML/MIN/1.73SQ M
GLUCOSE SERPL-MCNC: 111 MG/DL (ref 65–140)
GLUCOSE SERPL-MCNC: 119 MG/DL (ref 65–140)
GLUCOSE SERPL-MCNC: 129 MG/DL (ref 65–140)
GLUCOSE SERPL-MCNC: 142 MG/DL (ref 65–140)
GLUCOSE SERPL-MCNC: 93 MG/DL (ref 65–140)
HBA1C MFR BLD: 6.6 %
HCT VFR BLD AUTO: 24 % (ref 36.5–49.3)
HGB BLD-MCNC: 7.5 G/DL (ref 12–17)
IRON SATN MFR SERPL: 21 % (ref 20–50)
IRON SERPL-MCNC: 34 UG/DL (ref 65–175)
MCH RBC QN AUTO: 29.4 PG (ref 26.8–34.3)
MCHC RBC AUTO-ENTMCNC: 31.3 G/DL (ref 31.4–37.4)
MCV RBC AUTO: 94 FL (ref 82–98)
P AXIS: 20 DEGREES
P AXIS: 6 DEGREES
PLATELET # BLD AUTO: 284 THOUSANDS/UL (ref 149–390)
PMV BLD AUTO: 9.7 FL (ref 8.9–12.7)
POTASSIUM SERPL-SCNC: 4.6 MMOL/L (ref 3.5–5.3)
PR INTERVAL: 178 MS
PR INTERVAL: 186 MS
QRS AXIS: 56 DEGREES
QRS AXIS: 57 DEGREES
QRSD INTERVAL: 76 MS
QRSD INTERVAL: 78 MS
QT INTERVAL: 378 MS
QT INTERVAL: 380 MS
QTC INTERVAL: 449 MS
QTC INTERVAL: 454 MS
RBC # BLD AUTO: 2.55 MILLION/UL (ref 3.88–5.62)
SODIUM SERPL-SCNC: 135 MMOL/L (ref 136–145)
T WAVE AXIS: 10 DEGREES
T WAVE AXIS: 12 DEGREES
TIBC SERPL-MCNC: 163 UG/DL (ref 250–450)
VENTRICULAR RATE: 85 BPM
VENTRICULAR RATE: 86 BPM
WBC # BLD AUTO: 6.67 THOUSAND/UL (ref 4.31–10.16)

## 2022-01-24 PROCEDURE — 97530 THERAPEUTIC ACTIVITIES: CPT

## 2022-01-24 PROCEDURE — 82948 REAGENT STRIP/BLOOD GLUCOSE: CPT

## 2022-01-24 PROCEDURE — 99232 SBSQ HOSP IP/OBS MODERATE 35: CPT | Performed by: INTERNAL MEDICINE

## 2022-01-24 PROCEDURE — 83036 HEMOGLOBIN GLYCOSYLATED A1C: CPT | Performed by: INTERNAL MEDICINE

## 2022-01-24 PROCEDURE — 86037 ANCA TITER EACH ANTIBODY: CPT | Performed by: INTERNAL MEDICINE

## 2022-01-24 PROCEDURE — 83550 IRON BINDING TEST: CPT | Performed by: INTERNAL MEDICINE

## 2022-01-24 PROCEDURE — 93010 ELECTROCARDIOGRAM REPORT: CPT | Performed by: INTERNAL MEDICINE

## 2022-01-24 PROCEDURE — 83520 IMMUNOASSAY QUANT NOS NONAB: CPT | Performed by: INTERNAL MEDICINE

## 2022-01-24 PROCEDURE — 82728 ASSAY OF FERRITIN: CPT | Performed by: INTERNAL MEDICINE

## 2022-01-24 PROCEDURE — 72072 X-RAY EXAM THORAC SPINE 3VWS: CPT

## 2022-01-24 PROCEDURE — 83540 ASSAY OF IRON: CPT | Performed by: INTERNAL MEDICINE

## 2022-01-24 PROCEDURE — 80048 BASIC METABOLIC PNL TOTAL CA: CPT | Performed by: INTERNAL MEDICINE

## 2022-01-24 PROCEDURE — 85027 COMPLETE CBC AUTOMATED: CPT | Performed by: INTERNAL MEDICINE

## 2022-01-24 PROCEDURE — NC001 PR NO CHARGE: Performed by: RADIOLOGY

## 2022-01-24 RX ORDER — HYDROMORPHONE HCL/PF 1 MG/ML
0.5 SYRINGE (ML) INJECTION EVERY 4 HOURS PRN
Status: DISCONTINUED | OUTPATIENT
Start: 2022-01-24 | End: 2022-01-25

## 2022-01-24 RX ADMIN — ATORVASTATIN CALCIUM 80 MG: 80 TABLET, FILM COATED ORAL at 09:03

## 2022-01-24 RX ADMIN — OXYCODONE HYDROCHLORIDE 5 MG: 5 TABLET ORAL at 00:42

## 2022-01-24 RX ADMIN — ALLOPURINOL 100 MG: 100 TABLET ORAL at 09:03

## 2022-01-24 RX ADMIN — HYDROMORPHONE HYDROCHLORIDE 0.5 MG: 1 INJECTION, SOLUTION INTRAMUSCULAR; INTRAVENOUS; SUBCUTANEOUS at 21:24

## 2022-01-24 RX ADMIN — HEPARIN SODIUM 5000 UNITS: 5000 INJECTION INTRAVENOUS; SUBCUTANEOUS at 21:22

## 2022-01-24 RX ADMIN — HEPARIN SODIUM 5000 UNITS: 5000 INJECTION INTRAVENOUS; SUBCUTANEOUS at 06:18

## 2022-01-24 RX ADMIN — CLOPIDOGREL BISULFATE 75 MG: 75 TABLET ORAL at 09:04

## 2022-01-24 RX ADMIN — HEPARIN SODIUM 5000 UNITS: 5000 INJECTION INTRAVENOUS; SUBCUTANEOUS at 15:40

## 2022-01-24 RX ADMIN — SODIUM BICARBONATE 650 MG TABLET 1300 MG: at 16:49

## 2022-01-24 RX ADMIN — FUROSEMIDE 80 MG: 10 INJECTION, SOLUTION INTRAMUSCULAR; INTRAVENOUS at 12:19

## 2022-01-24 RX ADMIN — SODIUM BICARBONATE 650 MG TABLET 1300 MG: at 09:04

## 2022-01-24 RX ADMIN — HYDROMORPHONE HYDROCHLORIDE 1 MG: 1 INJECTION, SOLUTION INTRAMUSCULAR; INTRAVENOUS; SUBCUTANEOUS at 14:29

## 2022-01-24 RX ADMIN — FUROSEMIDE 80 MG: 10 INJECTION, SOLUTION INTRAMUSCULAR; INTRAVENOUS at 17:36

## 2022-01-24 RX ADMIN — HYDROMORPHONE HYDROCHLORIDE 1 MG: 1 INJECTION, SOLUTION INTRAMUSCULAR; INTRAVENOUS; SUBCUTANEOUS at 09:12

## 2022-01-24 RX ADMIN — HYDROMORPHONE HYDROCHLORIDE 1 MG: 1 INJECTION, SOLUTION INTRAMUSCULAR; INTRAVENOUS; SUBCUTANEOUS at 02:45

## 2022-01-24 RX ADMIN — CARVEDILOL 12.5 MG: 12.5 TABLET, FILM COATED ORAL at 09:03

## 2022-01-24 RX ADMIN — NIFEDIPINE 30 MG: 30 TABLET, FILM COATED, EXTENDED RELEASE ORAL at 09:04

## 2022-01-24 RX ADMIN — FUROSEMIDE 80 MG: 10 INJECTION, SOLUTION INTRAMUSCULAR; INTRAVENOUS at 06:18

## 2022-01-24 RX ADMIN — ALBUMIN (HUMAN) 25 G: 0.25 INJECTION, SOLUTION INTRAVENOUS at 21:22

## 2022-01-24 RX ADMIN — HYDROMORPHONE HYDROCHLORIDE 1 MG: 1 INJECTION, SOLUTION INTRAMUSCULAR; INTRAVENOUS; SUBCUTANEOUS at 17:23

## 2022-01-24 RX ADMIN — LIDOCAINE 5% 1 PATCH: 700 PATCH TOPICAL at 09:04

## 2022-01-24 RX ADMIN — SODIUM BICARBONATE 650 MG TABLET 1300 MG: at 12:18

## 2022-01-24 RX ADMIN — CARVEDILOL 12.5 MG: 12.5 TABLET, FILM COATED ORAL at 17:36

## 2022-01-24 RX ADMIN — NIFEDIPINE 30 MG: 30 TABLET, FILM COATED, EXTENDED RELEASE ORAL at 17:36

## 2022-01-24 RX ADMIN — ALBUMIN (HUMAN) 25 G: 0.25 INJECTION, SOLUTION INTRAVENOUS at 09:04

## 2022-01-24 NOTE — PROGRESS NOTES
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, TIA, diastolic CHF, who initially presents end of prior week with back pain  After a fall  Nephrology is on board for acute kidney injury  Patient was also initially noted to be hypertensive  1) DEVIN on CKD IV-V    - baseline creatinine 4 3-4 7 mg/dL (but to note, creatinine in February 2020 was 1 9, rising to 3 3 in July 2021, and rising further to 4 5-4 7 in November 2021)  -during prior admission in November, creatinine stabilized to 4 7 at [facility]  Losartan and chlorthalidone were held  Biopsy was planned but could not be completed due to body habitus   -admission creatinine 5 8 mg/dL  -patient was initially started on furosemide IV with albumin  -urinalysis with 0-1 RBC, 0-1 WBC, 2+ protein  -urine protein creatinine ratio 10 7 g  -CT scan-kidneys without hydronephrosis, renal hypodensities too small to characterize likely benign  Minimally displaced oblique fracture involving T10   -creatinine rising 6 4 mg/dL on January 23rd  -SPEP, UPEP-unrevealing prior  -chronic hepatitis panel-negative prior  -C3, C4 was not low prior  -NURIA negative  -ANCA-  -anti phospholipase A2 antibody receptor-  -anti GBM-  -A1c is 6 6%    Etiology of acute kidney injury is unclear  May need to consider membranous versus minimal change versus other  Does not appear nephritic  A1c is well controlled  To note, patient was using NSAIDs 3 tablets 3 to 4 times a day for 1 week prior to admission  Therefore there may be a component of NSAID induced nephropathy also    1/24-creatinine continues to rise to 6 9 mg/dL  Patient has nephrotic range proteinuria  No hematuria    No hydronephrosis  Plan    -I/O; avoid nephrotoxic agents  -avoid hypotension  -follow-up Anca, anti GBM antibodies,  -check MEGHANA 2R  -would hold Plavix due to potential biopsy later this week or early next week  -agree with continuing to hold Actos  -continue Lasix 3 times a day especially with transfusion plan for today  -continue albumin twice a day  -check stool occult  - I have reached out to radiology team to discuss biopsy questions  Patient is on Plavix which is being held  Therefore how long Plavix will need to be held? INR is pending  Also to request if IR team could evaluate the patient as he was declined for biopsy at [facility] due to body habitus  Patient states that he is willing to do a biopsy with ultrasound in may consider even with CT scan  But cannot do a with a MRI   -update-spoke to IR team who is willing to come up in evaluate the patient and discussed with the patient the options for biopsy  -reviewed with primary team attending  -avoid all NSAIDs as doing  - will need DDAVP prior to biopsy 30 minutes before -20 mcg IV 1 time  - I will fax [place] paperwork to IR this week - [phone]  - will need to be NPO Sunday night into Monday  - AM heparin sq will need to be held Monday AM for biopsy (1/31)    2) back pain    -patient had fall at home 1 week prior to admission  -per primary team    3) hypertension-    -on nifedipine and carvedilol    4) acid/base-    -bicarbonate lower at 16 and started on bicarbonate tablets and serum bicarbonate 17 on 01/24  -on bicarbonate tablets    5) anemia-    -hemoglobin low at 7 8  -iron panel-iron saturation 20%  -received Venofer prior admission in November  -history of TIA, will avoid SURINDER for now  -decreasing hemoglobin is likely a factor of CKD but initial hemoglobin was 10 on 01/21 and now has decreased to 7 5    Unclear accuracy of 10  -transfusion plan on January 24th    6) T10 vertebral fracture-per primary team    7) electrolytes-sodium is stable, potassium is stable    SUBJECTIVE / 24H INTERVAL HISTORY:    Blood pressure stable 008L to 174 systolic  Afebrile  On room air  Weight is not checked  Urine output 2 4 L yesterday  Patient denies shortness of breath      OBJECTIVE:  Current Weight: Weight - Scale: 130 kg (286 lb 9 6 oz)  Vitals:    01/23/22 1925 01/23/22 1939 01/24/22 0011 01/24/22 0723   BP: 149/72  139/65 138/65   BP Location: Left arm  Left arm Left arm   Pulse:  82 77 72   Resp: 20 20 19 20   Temp:  97 8 °F (36 6 °C)  98 °F (36 7 °C)   TempSrc:  Oral  Oral   SpO2:   94% 97%   Weight:       Height:           Intake/Output Summary (Last 24 hours) at 1/24/2022 7773  Last data filed at 1/24/2022 0327  Gross per 24 hour   Intake --   Output 2400 ml   Net -2400 ml     General: NAD  Skin: no rash  Eyes: anicteric sclera  ENT: moist mucous membrane  Neck: supple  Chest: CTA b/l, no ronchii, no wheeze, no rubs, no rales  CVS: s1s2, no murmur, no gallop, no rub  Abdomen: soft, nontender, nl sounds  Extremities:  2+ edema LE b/l  : no gaitan  Neuro: AAOX3  Psych: normal affect    Medications:    Current Facility-Administered Medications:     acetaminophen (TYLENOL) tablet 650 mg, 650 mg, Oral, Q6H PRN, Shaun Dalton MD    albumin human (FLEXBUMIN) 25 % injection 25 g, 25 g, Intravenous, Q12H [address], Shaun Dalton MD, 25 g at 01/24/22 0904    allopurinol (ZYLOPRIM) tablet 100 mg, 100 mg, Oral, Daily, Joel Dalton MD, 100 mg at 01/24/22 0903    atorvastatin (LIPITOR) tablet 80 mg, 80 mg, Oral, Daily, Joel Dalton MD, 80 mg at 01/24/22 0903    carvedilol (COREG) tablet 12 5 mg, 12 5 mg, Oral, BID, Joel Dalton MD, 12 5 mg at 01/24/22 0903    clopidogrel (PLAVIX) tablet 75 mg, 75 mg, Oral, Daily, Joel Dalton MD, 75 mg at 01/24/22 0904    furosemide (LASIX) injection 80 mg, 80 mg, Intravenous, TID (diuretic), Cristino Krishnan MD, 80 mg at 01/24/22 0618    heparin (porcine) subcutaneous injection 5,000 Units, 5,000 Units, Subcutaneous, Q8H [address], 5,000 Units at 01/24/22 0618 **AND** Platelet count, , , Once, Min Jeffries MD    HYDROmorphone (DILAUDID) injection 1 mg, 1 mg, Intravenous, Once, Min Jeffries MD    HYDROmorphone (DILAUDID) injection 1 mg, 1 mg, Intravenous, Q4H PRN, Min Jeffries MD, 1 mg at 01/24/22 0912    insulin lispro (HumaLOG) 100 units/mL subcutaneous injection 1-5 Units, 1-5 Units, Subcutaneous, HS, Min Jeffries MD    insulin lispro (HumaLOG) 100 units/mL subcutaneous injection 2-12 Units, 2-12 Units, Subcutaneous, TID AC, 2 Units at 01/21/22 1744 **AND** Fingerstick Glucose (POCT), , , TID AC, Min Jeffries MD    lidocaine (LIDODERM) 5 % patch 1 patch, 1 patch, Topical, Daily, Min Jeffries MD, 1 patch at 01/24/22 0904    NIFEdipine (PROCARDIA XL) 24 hr tablet 30 mg, 30 mg, Oral, BID, Benton Rain MD, 30 mg at 01/24/22 0904    ondansetron (ZOFRAN) injection 4 mg, 4 mg, Intravenous, Q6H PRN, Min Jeffries MD    oxyCODONE (ROXICODONE) IR tablet 2 5 mg, 2 5 mg, Oral, Q6H PRN, Min Jeffries MD    oxyCODONE (ROXICODONE) IR tablet 5 mg, 5 mg, Oral, Q4H PRN, Min Jeffries MD, 5 mg at 01/24/22 0042    sodium bicarbonate tablet 1,300 mg, 1,300 mg, Oral, TID after meals, Benton Rain MD, 1,300 mg at 01/24/22 7790    Laboratory Results:  Results from last 7 days   Lab Units 01/24/22  0530 01/23/22  0504 01/22/22  0804 01/21/22  1218   WBC Thousand/uL 6 67 5 17 6 53 8 38   HEMOGLOBIN g/dL 7 5* 7 8* 8 7* 10 0*   HEMATOCRIT % 24 0* 25 7* 28 0* 32 2*   PLATELETS Thousands/uL 284 289 302 347   POTASSIUM mmol/L 4 6 4 6 4 5 5 1   CHLORIDE mmol/L 102 104 104 108   CO2 mmol/L 17* 16* 17* 19*   BUN mg/dL 76* 70* 70* 63*   CREATININE mg/dL 6 93* 6 41* 5 79* 5 79*   CALCIUM mg/dL 7 6* 7 7* 7 5* 7 9*   MAGNESIUM mg/dL  --   --   --  2 1

## 2022-01-24 NOTE — ASSESSMENT & PLAN NOTE
Results from last 7 days   Lab Units 01/24/22  0530 01/23/22  0504 01/22/22  0804 01/21/22  1218   HEMOGLOBIN g/dL 7 5* 7 8* 8 7* 10 0*   HEMATOCRIT % 24 0* 25 7* 28 0* 32 2*   MCV fL 94 96 94 93   Patient's hemoglobin dropped from 10-7 8  Normal MCV  No evidence of active bleeding  Iron panel showed iron level of 34, TIBC of 163 and ferritin level of 61 suggesting iron deficiency-pending stool occult blood  Baseline hemoglobin appears to be in the range of 8-8 5  Questionable lab better with admission hemoglobin level of 10  Transfuse PRBC if hemoglobin drops further    Given his history of TIA to avoid SURINDER for now

## 2022-01-24 NOTE — ASSESSMENT & PLAN NOTE
Patient continues to complain of some diarrhea  Stool for bacterial panel and C diff toxin were negative  Abdominal exam is benign  CT scan of the abdomen pelvis showed no acute pathology  Diarrhea has since resolved

## 2022-01-24 NOTE — PLAN OF CARE
Problem: Prexisting or High Potential for Compromised Skin Integrity  Goal: Skin integrity is maintained or improved  Description: INTERVENTIONS:  - Identify patients at risk for skin breakdown  - Assess and monitor skin integrity  - Assess and monitor nutrition and hydration status  - Monitor labs   - Assess for incontinence   - Turn and reposition patient  - Assist with mobility/ambulation  - Relieve pressure over bony prominences  - Avoid friction and shearing  - Provide appropriate hygiene as needed including keeping skin clean and dry  - Evaluate need for skin moisturizer/barrier cream  - Collaborate with interdisciplinary team   - Patient/family teaching  - Consider wound care consult   Outcome: Progressing     Problem: MOBILITY - ADULT  Goal: Maintain or return to baseline ADL function  Description: INTERVENTIONS:  -  Assess patient's ability to carry out ADLs; assess patient's baseline for ADL function and identify physical deficits which impact ability to perform ADLs (bathing, care of mouth/teeth, toileting, grooming, dressing, etc )  - Assess/evaluate cause of self-care deficits   - Assess range of motion  - Assess patient's mobility; develop plan if impaired  - Assess patient's need for assistive devices and provide as appropriate  - Encourage maximum independence but intervene and supervise when necessary  - Involve family in performance of ADLs  - Assess for home care needs following discharge   - Consider OT consult to assist with ADL evaluation and planning for discharge  - Provide patient education as appropriate  Outcome: Progressing  Goal: Maintains/Returns to pre admission functional level  Description: INTERVENTIONS:  - Perform BMAT or MOVE assessment daily    - Set and communicate daily mobility goal to care team and patient/family/caregiver     - Collaborate with rehabilitation services on mobility goals if consulted  - Out of bed for toileting  - Record patient progress and toleration of activity level   Outcome: Progressing     Problem: Potential for Falls  Goal: Patient will remain free of falls  Description: INTERVENTIONS:  - Educate patient/family on patient safety including physical limitations  - Instruct patient to call for assistance with activity   - Consult OT/PT to assist with strengthening/mobility   - Keep Call bell within reach  - Keep bed low and locked with side rails adjusted as appropriate  - Keep care items and personal belongings within reach  - Initiate and maintain comfort rounds  - Make Fall Risk Sign visible to staff  - Apply yellow socks and bracelet for high fall risk patients  - Consider moving patient to room near nurses station  Outcome: Progressing

## 2022-01-24 NOTE — PHYSICAL THERAPY NOTE
PT TREATMENT     01/24/22 1001   Note Type   Note Type Treatment   Pain Assessment   Pain Assessment Tool 0-10   Pain Score 5   Pain Location/Orientation Location: Back   Restrictions/Precautions   Braces or Orthoses TLSO   Other Precautions Contact/isolation; Fall Risk;Pain   General   Chart Reviewed Yes   Cognition   Overall Cognitive Status WFL   Subjective   Subjective "My back hurts"   Transfers   Sit to Stand 4  Minimal assistance   Additional items   (elevated bed)   Stand to Sit 4  Minimal assistance   Stand pivot 4  Minimal assistance   Additional Comments min/mod assist to don TLSO as pt was sitting up at the edge of the bed without it on  Pt eductated in the importance of wearing the TLSO when sitting upright/OOB  Ambulation/Elevation   Gait pattern   (flexed posture)   Gait Assistance 4  Minimal assist cues for upright posture   Assistive Device Rolling walker  (TLSO)   Distance 2x60 feet; Extended rest breaks between walks   Balance   Static Sitting Good   Dynamic Sitting Fair +   Static Standing Fair +   Dynamic Standing Fair   Ambulatory Fair -   Activity Tolerance   Activity Tolerance Patient limited by pain; Patient limited by fatigue   Assessment   Prognosis Good   Problem List Decreased strength;Decreased endurance; Impaired balance;Decreased mobility;Pain;Obesity;Orthopedic restrictions   Assessment Pt demonstrates contined generalized weakness and pain  Re-educated pt about use of the TLSO  The patient's AM-PAC Basic Mobility Inpatient Short Form Raw Score is 18  A Raw score of greater than 16 suggests the patient may benefit from discharge to home  Plan   Treatment/Interventions ADL retraining;Functional transfer training;LE strengthening/ROM; Therapeutic exercise; Endurance training;Gait training;Bed mobility; Equipment eval/education;Patient/family training;Elevations   Progress Progressing toward goals   PT Frequency Other (Comment)  (5w)   Recommendation   PT Discharge Recommendation Post acute rehabilitation services   Equipment Recommended [facility] Recommended Wheeled walker   Change/add to [employer]?  No   AM-PAC Basic Mobility Inpatient   Turning in Bed Without Bedrails 3   Lying on Back to Sitting on Edge of Flat Bed 3   Moving Bed to Chair 3   Standing Up From Chair 3   Walk in Room 3   Climb 3-5 Stairs 2   Basic Mobility Inpatient Raw Score 17   Basic Mobility Standardized Score 39 67   Highest Level Of Mobility   JH-HLM Goal 5: Stand one or more mins   JH-HLM Highest Level of Mobility 7: Walk 25 feet or more   JH-HLM Goal Achieved Yes   Education   Education Provided Mobility training;Assistive device  (TLSO wearing)   Patient Reinforcement needed   End of Consult   Patient Position at End of Consult Supine   Licensure   [place] License Number  Anahy Lombardo PT [id number]

## 2022-01-24 NOTE — TELEMEDICINE
e-Consult (IPC)  - Interventional Radiology  Manish Muniz 57 y o  male MRN: [id number]  Unit/Bed#: 81345 Marilyn Kimball Encounter: [id number]    IR has been consulted to evaluate the patient, determine the appropriate procedure, and whether or not a procedure can and should be performed regarding the care of Manish Muniz  IP Consult to IR  Consult performed by: Kike Irene MD  Consult ordered by: Chuck Garcia MD        01/24/22      Assessment/Recommendation:   69-year-old male with medical history of CKD, diabetes and hypertension presented with AK I on CKD  Interventional Radiology has been consulted for random kidney biopsy  I spoke to Dr David Daily over the phone  Patient takes Plavix daily and his last dose was at 9:00 a m  This morning  Per our departmental protocol, Plavix needs to be held for 5 days prior to this biopsy and subcutaneous prophylactic dose of heparin needs to be held 6 hours prior to the biopsy  This was discussed with Dr David Daily and the biopsy is tentatively scheduled for Monday 01/31/2022  I placed an order for the biopsy and an NPO order  Total time spent in review of data, discussion with requesting provider and rendering advice was 10 mins       Patient or appropriate family member was verbally informed by Dr David Daily of this consultative service on their behalf to provide more timely access to specialty care in lieu of an in person consultation  Verbal consent was obtained  Thank you for allowing Interventional Radiology to participate in the care of Manish Muniz  Please don't hesitate to call or TigerText us with any questions       Kike Irene MD

## 2022-01-24 NOTE — ASSESSMENT & PLAN NOTE
Lab Results   Component Value Date    HGBA1C 6 6 (H) 01/24/2022       Recent Labs     01/23/22  1546 01/23/22  2158 01/24/22  0804 01/24/22  1114   POCGLU 121 119 111 129       Blood Sugar Average: Last 72 hrs:  (P) 694 [id number]   Patient is on linagliptin and Actos at home  Hold oral antidiabetic agents  Continue Humalog sliding scale with Accu-Cheks q a c  And HS    Blood sugars stable

## 2022-01-24 NOTE — PROGRESS NOTES
[address]  Progress Note - Bill Moody 1960, 57 y o  male MRN: [id number]  Unit/Bed#: 96612 Marilyn Kimball Encounter: [id number]  Primary Care Provider: Sudhir Dutta MD   Date and time admitted to hospital: 1/21/2022 11:44 AM    * Acute kidney injury superimposed on chronic kidney disease [facility])  Assessment & Plan  Lab Results   Component Value Date    EGFR 7 01/24/2022    EGFR 8 01/23/2022    EGFR 9 01/22/2022    CREATININE 6 93 (H) 01/24/2022    CREATININE 6 41 (H) 01/23/2022    CREATININE 5 79 (H) 01/22/2022   Patient has history of CKD stage IV  Baseline creatinine appears to be around 4 3-4 7  Admission creatinine was 5 79 which increased to 6 4  Patient also noted to have significant anasarca with minimally elevated potassium at 5 1  Etiology suspected to be acute kidney injury related to possible cardiorenal syndrome, use of NSAIDs or progression of CKD  No urgent indication for renal replacement therapy  Patient was initially started on Lasix 40 milligram IV q 12 hours with albumin 25 grams q 12 hours  Patient had good urine output but creatinine got worse  Nephrology input appreciated  Lasix was increased to 80 milligram Q 8 hours  Patient to continue albumin 25 grams q 2 hours  CT scan of the abdomen pelvis was ordered to rule out hydronephrosis which is pending at the current time  UA showed trace amount of blood and protein  Avoid nephrotoxic agents and hypotension  Patient's previous SPEP/UPEP were negative and chronic hepatitis panel was negative  C3-C4 not low and NURIA was negative  Patient might need kidney biopsy if creatinine does not improve  ANCA panel and anti GBM antibodies were ordered by Nephrology  CT scan showed no evidence of hydronephrosis  Actos was discontinued as it can cause fluid retention  Patient will need kidney biopsy  Plavix was held in preparation for kidney biopsy    Patient was seen by IR and patient tentatively scheduled for kidney biopsy on January 31, 2021  Nephrology suggesting DDAVP prior to biopsy 30 minutes before 20 micrograms IV x1 time dose  Needs heparin to be held on the morning of 131  T10 vertebral fracture [facility])  Assessment & Plan  Patient sustained a fall about 1 week ago slipping on a rug  Patient has been having severe lower back pain since then  Denies any bowel or bladder incontinence, tingling or numbness in the legs  Patient received fentanyl and 2 dose of Dilaudid in the ED with persistent pain  Lumbar x-ray showed no fractures or malalignment with possible ankylosing spondylitis  CT scan of the abdomen pelvis showed minimally displaced oblique fracture involving the T10 vertebral body without extending into the posterior elements without any real bony retropulsion  Discussed with Neurosurgery Dr Leopoldo Bushman with pain management  Will get thoracic spine x-ray  Patient ordered for Lidoderm patch and Dilaudid p r n  For breakthrough pain with improved pain  Patient started on oxycodone for moderate and severe pain  Outpatient follow-up with Neurosurgery    Anemia  Assessment & Plan  Results from last 7 days   Lab Units 01/24/22  0530 01/23/22  0504 01/22/22  0804 01/21/22  1218   HEMOGLOBIN g/dL 7 5* 7 8* 8 7* 10 0*   HEMATOCRIT % 24 0* 25 7* 28 0* 32 2*   MCV fL 94 96 94 93   Patient's hemoglobin dropped from 10-7 8  Normal MCV  No evidence of active bleeding  Iron panel showed iron level of 34, TIBC of 163 and ferritin level of 61 suggesting iron deficiency-pending stool occult blood  Baseline hemoglobin appears to be in the range of 8-8 5  Questionable lab better with admission hemoglobin level of 10  Transfuse PRBC if hemoglobin drops further  Given his history of TIA to avoid SURINDER for now    Diarrhea-resolved as of 1/24/2022  Assessment & Plan  Patient continues to complain of some diarrhea    Stool for bacterial panel and C diff toxin were negative  Abdominal exam is benign  CT scan of the abdomen pelvis showed no acute pathology  Diarrhea has since resolved  TIA (transient ischemic attack)  Assessment & Plan  Patient is on Plavix and statin  Continue statin previously Plavix will be held in preparation for kidney biopsy  Hypertension  Assessment & Plan  Continue nifedipine 30 milligram p o  B i d  And Coreg 12 5 milligram p o  B i d  Avoid hypotension    Diabetes mellitus [facility])  Assessment & Plan  Lab Results   Component Value Date    HGBA1C 6 6 (H) 2022       Recent Labs     22  1546 22  2158 22  0804 22  1114   POCGLU 121 119 111 129       Blood Sugar Average: Last 72 hrs:  (P) 960 [id number]   Patient is on linagliptin and Actos at home  Hold oral antidiabetic agents  Continue Humalog sliding scale with Accu-Cheks q a c  And HS  Blood sugars stable          VTE Pharmacologic Prophylaxis:   High Risk (Score >/= 5) - Pharmacological DVT Prophylaxis Ordered: heparin  Sequential Compression Devices Ordered  Patient Centered Rounds: I performed bedside rounds with nursing staff today  Discussions with Specialists or Other Care Team Provider: Dr Geni Guzman    Education and Discussions with Family / Patient: yes    Time Spent for Care: 45 minutes  More than 50% of total time spent on counseling and coordination of care as described above  Current Length of Stay: 3 day(s)  Current Patient Status: Inpatient   Certification Statement: The patient will continue to require additional inpatient hospital stay due to Acute kidney injury, anasarca, anemia, T10 vertebral fracture  Discharge Plan: Anticipate discharge in >72 hrs to rehab facility  Code Status: Level 1 - Full Code    Subjective:     Patient is urinating well with Lasix  No further diarrhea since yesterday morning  Denies any abdominal pain    Still complaining of significant low back pain  Objective:     Vitals:   Temp (24hrs), Av °F (36 7 °C), Min:97 9 °F (36 6 °C), Max:98 °F (36 7 °C)    Temp:  [97 9 °F (36 6 °C)-98 °F (36 7 °C)] 97 9 °F (36 6 °C)  HR:  [70-77] 70  Resp:  [19-20] 20  BP: (138-142)/(64-70) 142/64  SpO2:  [94 %-97 %] 96 %  Body mass index is 37 81 kg/m²  Input and Output Summary (last 24 hours): Intake/Output Summary (Last 24 hours) at 1/24/2022 1940  Last data filed at 1/24/2022 1405  Gross per 24 hour   Intake --   Output 1700 ml   Net -1700 ml       Physical Exam:   Physical Exam  Constitutional:       Appearance: Normal appearance  HENT:      Head: Normocephalic and atraumatic  Eyes:      Extraocular Movements: Extraocular movements intact  Pupils: Pupils are equal, round, and reactive to light  Cardiovascular:      Rate and Rhythm: Normal rate and regular rhythm  Heart sounds: No murmur heard  No gallop  Pulmonary:      Effort: Pulmonary effort is normal       Breath sounds: Normal breath sounds  Abdominal:      General: Bowel sounds are normal       Palpations: Abdomen is soft  Tenderness: There is no abdominal tenderness  Musculoskeletal:         General: No swelling or deformity  Normal range of motion  Cervical back: Normal range of motion and neck supple  Right lower leg: Edema present  Left lower leg: Edema present  Comments: Plus three bilateral pedal edema   Skin:     General: Skin is warm and dry  Comments: Chronic venous stasis change   Neurological:      General: No focal deficit present  Mental Status: He is alert  Additional Data:     Labs:  Results from last 7 days   Lab Units 01/24/22  0530 01/23/22  0504 01/23/22  0504   WBC Thousand/uL 6 67   < > 5 17   HEMOGLOBIN g/dL 7 5*   < > 7 8*   HEMATOCRIT % 24 0*   < > 25 7*   PLATELETS Thousands/uL 284   < > 289   NEUTROS PCT %  --   --  57   LYMPHS PCT %  --   --  22   MONOS PCT %  --   --  16*   EOS PCT %  --   --  3    < > = values in this interval not displayed       Results from last 7 days   Lab Units 01/24/22  0530 01/22/22  3319 01/21/22  1218   SODIUM mmol/L 135*   < > 140   POTASSIUM mmol/L 4 6   < > 5 1   CHLORIDE mmol/L 102   < > 108   CO2 mmol/L 17*   < > 19*   BUN mg/dL 76*   < > 63*   CREATININE mg/dL 6 93*   < > 5 79*   ANION GAP mmol/L 16*   < > 13   CALCIUM mg/dL 7 6*   < > 7 9*   ALBUMIN g/dL  --   --  2 3*   TOTAL BILIRUBIN mg/dL  --   --  0 19*   ALK PHOS U/L  --   --  104   ALT U/L  --   --  32   AST U/L  --   --  37   GLUCOSE RANDOM mg/dL 93   < > 149*    < > = values in this interval not displayed  Results from last 7 days   Lab Units 01/24/22  1608 01/24/22  1114 01/24/22  0804 01/23/22  2158 01/23/22  1546 01/23/22  1136 01/23/22  0708 01/22/22  2142 01/22/22  1142 01/22/22  0759 01/21/22  2034 01/21/22  1732   POC GLUCOSE mg/dl 142* 129 111 119 121 107 78 104 117 88 128 176*     Results from last 7 days   Lab Units 01/24/22  0530   HEMOGLOBIN A1C % 6 6*           Lines/Drains:  Invasive Devices  Report    Peripheral Intravenous Line            Peripheral IV 01/24/22 Dorsal (posterior); Right Hand <1 day                      Imaging: Reviewed radiology reports from this admission including: abdominal/pelvic CT    Recent Cultures (last 7 days):   Results from last 7 days   Lab Units 01/22/22  1831   C DIFF TOXIN B BY PCR  Negative       Last 24 Hours Medication List:   Current Facility-Administered Medications   Medication Dose Route Frequency Provider Last Rate    acetaminophen  650 mg Oral Q6H PRN Zulma Bellamy MD      albumin human  25 g Intravenous Q12H Andrea Hubbard MD      allopurinol  100 mg Oral Daily Zulma Bellamy MD      atorvastatin  80 mg Oral Daily Zulma Bellamy MD      carvedilol  12 5 mg Oral BID Zulma Bellamy MD      furosemide  80 mg Intravenous TID (diuretic) Rose Mary Evans MD      heparin (porcine)  5,000 Units Subcutaneous [facility] Zulma Bellamy MD      HYDROmorphone  1 mg Intravenous Once Zulma Bellamy MD      HYDROmorphone  1 mg Intravenous Q4H PRN Shaylee Álvarez MD      insulin lispro  1-5 Units Subcutaneous HS Shaylee Álvarez MD      insulin lispro  2-12 Units Subcutaneous TID AC Joel Dalton MD      lidocaine  1 patch Topical Daily Shaylee Álvarez MD      NIFEdipine  30 mg Oral BID Minetta Romberg, MD      ondansetron  4 mg Intravenous Q6H PRN Shaylee Álvarez MD      oxyCODONE  2 5 mg Oral Q6H PRN Shaylee Álvarez MD      oxyCODONE  5 mg Oral Q4H PRN Shaylee Álvarez MD      sodium bicarbonate  1,300 mg Oral TID after meals Minetta Romberg, MD          Today, Patient Was Seen By: Shaylee Álvarez MD    **Please Note: This note may have been constructed using a voice recognition system  **

## 2022-01-24 NOTE — CASE MANAGEMENT
Case Management Assessment & Discharge Planning Note    Patient name Laine Mcclain  Location [address] [facility] MRN [id number]  : 1960 Date 2022       Current Admission Date: 2022  Current Admission Diagnosis:Acute kidney injury superimposed on chronic kidney disease [facility])   Patient Active Problem List    Diagnosis Date Noted    Anemia 2022    Acute kidney injury superimposed on chronic kidney disease ([facility] [address] ) 2022    Diabetes mellitus ([facility] [address] )     Hypertension     TIA (transient ischemic attack)     T10 vertebral fracture (HCC)       LOS (days): 3  Geometric Mean LOS (GMLOS) (days):   Days to GMLOS:     OBJECTIVE:    Risk of Unplanned Readmission Score: 16     Current admission status: Inpatient    Preferred Pharmacy:   [address] [place] [address]  Phone: [phone] Fax: [phone]    Primary Care Provider: Gregory Walsh MD    Primary Insurance: MEDICARE  Secondary Insurance:     ASSESSMENT:  [facility], [facility]   Primary Phone: [phone] (Mobile)               Advance Directives  Does patient have a [address]?: No  Was patient offered paperwork?: Yes (not interested)  Does patient currently have a Health Care decision maker?: Yes, please see Health Care Proxy section  Does patient have Advance Directives?: No  Was patient offered paperwork?: Yes (not interested)  Primary Contact: Cordelia Krabbe    Readmission Root Cause  30 Day Readmission: No    Patient Information  Admitted from[de-identified] Home  Mental Status: Alert  During Assessment patient was accompanied by: Not accompanied during assessment  Assessment information provided by[de-identified] Patient  Primary Caregiver: Self  Support Systems: Self,Family members  [place] of Residence: Other (specify in comment box) [facility])  What city do you live in?: [address] [facility] [address] [address] entry access options   Select all that apply : Stairs  Number of steps to enter home : 6  Do the steps have railings?: Yes  Type of Current Residence: Apartment  In the last 12 months, how many places have you lived?: 2 (has been living with his sister for the last several weeks)  In the last 12 months, was there a time when you did not have a steady place to sleep or slept in a shelter (including now)?: No  Homeless/housing insecurity resource given?: N/A  Living Arrangements: Other (Comment),Lives Alone (currently was living with sister)  Is patient a ?: No    Activities of Daily Living Prior to Admission  Functional Status: Independent  Completes ADLs independently?: Yes  Ambulates independently?: Yes  Does patient use assisted devices?: No  Does patient currently own DME?: No  Does patient have a history of Outpatient Therapy (PT/OT)?: No  Does the patient have a history of Short-Term Rehab?: No  Does patient have a history of HHC?: Yes (over five years ago after foot surgery-could not recall name of agency)  Does patient currently have [employer]?: No    Patient Information Continued  Income Source: SSI/SSD  Does patient have prescription coverage?: Yes ([facility])  Within the past 12 months, you worried that your food would run out before you got the money to buy more : Never true  Within the past 12 months, the food you bought just didnt last and you didnt have money to get more : Never true  Food insecurity resource given?: N/A  Does patient receive dialysis treatments?: No  Does patient have a history of substance abuse?: Yes  Historical substance use preference: Alcohol/ETOH  Is patient currently in treatment for substance abuse?: N/A - sober (Sober for 12 years)  Does patient have a history of Mental Health Diagnosis?: No    Means of Transportation  Means of Transport to Appts[de-identified] Drives Self  In the past 12 months, has lack of transportation kept you from medical appointments or from getting medications?: No  In the past 12 months, has lack of transportation kept you from meetings, work, or from getting things needed for daily living?: No  Was application for public transport provided?: N/A        DISCHARGE DETAILS:    Discharge planning discussed with[de-identified] Patient  Freedom of Choice: Yes  Comments - Freedom of Choice: SW met with pt to assess needs and discuss plans  STR placement is being recommended  Pt is open to rehab placement and requested referrals be made to [employer], [address] at [facility] and [address] at [place]  SW also provided pt with list of area facilities to review in the event additional referrals need to be made due to limited availability    CM contacted family/caregiver?: No- see comments (per pt)    Requested [address]         Is the patient interested in [place] [address] at discharge?: No    DME Referral Provided  Referral made for DME?: No    Other Referral/Resources/Interventions Provided:  Interventions: Short Term Rehab  Referral Comments: STR referrals placed as requested    Treatment Team Recommendation: Short Term Rehab  Discharge Destination Plan[de-identified] Short Term Rehab  Transport at Discharge : Wheelchair van,[facility] (pending progress)    IMM Given (Date):: 01/21/22 (initial)

## 2022-01-24 NOTE — ASSESSMENT & PLAN NOTE
Lab Results   Component Value Date    EGFR 7 01/24/2022    EGFR 8 01/23/2022    EGFR 9 01/22/2022    CREATININE 6 93 (H) 01/24/2022    CREATININE 6 41 (H) 01/23/2022    CREATININE 5 79 (H) 01/22/2022   Patient has history of CKD stage IV  Baseline creatinine appears to be around 4 3-4 7  Admission creatinine was 5 79 which increased to 6 4  Patient also noted to have significant anasarca with minimally elevated potassium at 5 1  Etiology suspected to be acute kidney injury related to possible cardiorenal syndrome, use of NSAIDs or progression of CKD  No urgent indication for renal replacement therapy  Patient was initially started on Lasix 40 milligram IV q 12 hours with albumin 25 grams q 12 hours  Lasix dose was increased to 80 milligram IV q 8 hours and patient continued on albumin 25 grams q 12 hours  Urine output was 2400 over the past 24 hours  Creatinine is getting worse  CT scan of the abdomen pelvis was ordered to rule out hydronephrosis which is pending at the current time  UA showed trace amount of blood and protein  Avoid nephrotoxic agents and hypotension  Patient's previous SPEP/UPEP were negative and chronic hepatitis panel was negative  C3-C4 not low and NURIA was negative  Patient might need kidney biopsy if creatinine does not improve  ANCA panel and anti GBM antibodies were ordered by Nephrology  CT scan showed no evidence of hydronephrosis  Actos was discontinued as it can cause fluid retention  Patient will need kidney biopsy  Plavix was held in preparation for kidney biopsy  Patient was seen by IR and patient tentatively scheduled for kidney biopsy on January 31, 2021  Nephrology suggesting DDAVP prior to biopsy 30 minutes before 20 micrograms IV x1 time dose  Needs heparin to be held on the morning of 131

## 2022-01-24 NOTE — APP STUDENT NOTE
[facility] Internal [facility] Progress Note  Patient: Laine Mcclain 57 y o  male   MRN: [id number]  PCP: Gregory Walsh MD  Unit/Bed#: 2 [place] [address] Encounter: [id number]  Date Of Visit: 01/24/22    Problem List:    Principal Problem:    Acute kidney injury superimposed on chronic kidney disease ([facility] )  Active Problems:    Diabetes mellitus ([facility] )    Hypertension    TIA (transient ischemic attack)    T10 vertebral fracture ([facility] )    Anemia      Assessment & Plan:     1  Acute kidney injury superimposed on chronic kidney disease ([facility] ) - possible cardiorenal syndrome vs NSAIDs vs CKD progression    Lab Results   Component Value Date    EGFR 7 01/24/2022    EGFR 8 01/23/2022    EGFR 9 01/22/2022    CREATININE 6 93 (H) 01/24/2022    CREATININE 6 41 (H) 01/23/2022    CREATININE 5 79 (H) 01/22/2022     - Patient has history of CKD stage IV; Baseline creatinine around 4 3-4 7  - Admission creatinine was 5 79 which increased to 6 4  - Patient was noted to have significant anasarca with minimally elevated potassium at 5 1  - No urgent indication for renal replacement therapy  - UA = trace blood and protein  - CT abd/pelvis 1/23 = no hydronephrosis or urinary tract calculus  "One or more sharply circumscribed subcentimeter renal hypodensities are present, too small to accurately characterize, and statistically most likely benign findings  "  - per nephrology, d/c Actos as it can cause fluid retention   - per nephrology and IR consult, kidney biopsy planned 01/31/22; continue to hold Plavix  Heparin to be held on the morning of 1/31 (6hrs prior to procedure)  Nephrology suggesting DDAVP prior to biopsy 30 minutes before 20 micrograms IV x1 time dose  - ANCA panel, anti GBM antibodies, antiphospholipase A2 receptor AB = pending  - avoid nephrotoxic agents and hypotension  - continue albumin 25g Q12h   - continue lasix 80mg TID    2   T10 vertebral fracture ([facility] )  Patient sustained a fall about 1 week ago slipping on a rug and has reported severe lower back pain since then  - CT abd/pelvis 1/23 = minimally displaced oblique fracture involving the T10 vertebral body without extending into the posterior elements without any real bony retropulsion  - Dr Virgil Givens, discussed with Neurosurgery Dr Issa Ferreira - recommending TSLO brace with pain management  Outpatient f/u with neurosurgery   -  thoracic spine XR = pending   - lidocaine 5% patch daily  - oxycodone 5mg PO Q4h PRN severe pain  - hydromorphone 1mg IV Q4h PRN breakthrough pain     3  Anemia  - No evidence of active bleeding  - Iron panel = iron level of 34, TIBC of 163 and ferritin level of 64 suggesting iron deficiency  - stool occult blood = pending  - Transfuse PRBC if further drop in Hgb    Results from last 7 days   Lab Units 01/24/22  0530 01/23/22  0504 01/22/22  0804 01/21/22  1218   HEMOGLOBIN g/dL 7 5* 7 8* 8 7* 10 0*   HEMATOCRIT % 24 0* 25 7* 28 0* 32 2*   MCV fL 94 96 94 93       4  Hypertension  - continue home nifedipine and carvedilol  - monitor for hypotension      5  Diabetes mellitus ([facility] )  - hold home linagliptin and Actos  - continue Humalog sliding scale with accucheks TID before meals and at bedtime     Lab Results   Component Value Date    HGBA1C 6 6 (H) 01/24/2022     Recent Labs     01/23/22  1546 01/23/22  2158 01/24/22  0804 01/24/22  1114   POCGLU 121 119 111 129       6  Metabolic acidosis   - start sodium bicarb 1300mg PO TID per renal    7  History TIA (transient ischemic attack)  - continue home statin   - hold home Plavix due to kidney biopsy planned 1/31    8   Diarrhea-resolved as of 1/24/2022  - Stool for bacterial panel and C diff toxin = negative  - CT scan abd/ pelvis = no acute pathology      VTE Pharmacologic Prophylaxis:   Pharmacologic: Heparin  Mechanical VTE Prophylaxis in Place: No    Current Length of Stay: 3 day(s)    Current Patient Status: Inpatient     Code Status: Level 1 - Full Code      Subjective:   Jonnathan Claros a 57 y o  male with PMH CKD, diabetes mellitus, HTN, and hyperlipidemia, is HD#3 for acute kidney injury and concurrent T10 vertebral body fracture secondary to mechanical fall  Today he reports significant non-radiating lower back pain not relieved with PRN medications  He denies CP/palpitations, dyspnea, N/V/D, abd pain  Objective:     Vitals:   Temp (24hrs), Av °F (36 7 °C), Min:97 8 °F (36 6 °C), Max:98 1 °F (36 7 °C)    Temp:  [97 8 °F (36 6 °C)-98 1 °F (36 7 °C)] 98 °F (36 7 °C)  HR:  [72-82] 72  Resp:  [18-20] 20  BP: (138-149)/(65-72) 138/69  SpO2:  [94 %-98 %] 97 %  Body mass index is 37 81 kg/m²  Input and Output Summary (last 24 hours): Intake/Output Summary (Last 24 hours) at 2022 1403  Last data filed at 2022 0327  Gross per 24 hour   Intake --   Output 1650 ml   Net -1650 ml       Physical Exam:     Physical Exam  Constitutional:       Appearance: He is obese  HENT:      Head: Normocephalic and atraumatic  Eyes:      Extraocular Movements: Extraocular movements intact  Conjunctiva/sclera: Conjunctivae normal    Cardiovascular:      Rate and Rhythm: Normal rate and regular rhythm  Heart sounds: Normal heart sounds  No murmur heard  No friction rub  No gallop  Pulmonary:      Effort: Pulmonary effort is normal       Breath sounds: Normal breath sounds  No wheezing, rhonchi or rales  Abdominal:      General: Bowel sounds are normal       Palpations: Abdomen is soft  Tenderness: There is no abdominal tenderness  There is no guarding or rebound  Musculoskeletal:      Right lower leg: Edema present  Left lower leg: Edema present  Skin:     General: Skin is warm and dry  Neurological:      Mental Status: He is alert  Psychiatric:         Mood and Affect: Mood is depressed           Additional Data:     Labs:    Results from last 7 days   Lab Units 22  0530 22  0504 22  0504   WBC Thousand/uL 6 67   < > 5 17   HEMOGLOBIN g/dL 7 5*   < > 7 8*   HEMATOCRIT % 24 0*   < > 25 7*   PLATELETS Thousands/uL 284   < > 289   NEUTROS PCT %  --   --  57   LYMPHS PCT %  --   --  22   MONOS PCT %  --   --  16*   EOS PCT %  --   --  3    < > = values in this interval not displayed  Results from last 7 days   Lab Units 01/24/22  0530 01/22/22  0804 01/21/22  1218   POTASSIUM mmol/L 4 6   < > 5 1   CHLORIDE mmol/L 102   < > 108   CO2 mmol/L 17*   < > 19*   BUN mg/dL 76*   < > 63*   CREATININE mg/dL 6 93*   < > 5 79*   CALCIUM mg/dL 7 6*   < > 7 9*   ALK PHOS U/L  --   --  104   ALT U/L  --   --  32   AST U/L  --   --  37    < > = values in this interval not displayed  * I Have Reviewed All Lab Data Listed Above  * Additional Pertinent Lab Tests Reviewed: All Labs Within Last 24 Hours Reviewed      Imaging:  Chest XR 1 view Portable 1/21/22  Narrative & Impression   CHEST   INDICATION:   leg edema  COMPARISON:  None  EXAM PERFORMED/VIEWS:  XR CHEST 1 VIEW     FINDINGS:  Cardiomediastinal silhouette appears unremarkable  The lungs are clear  No pneumothorax or pleural effusion  Osseous structures appear within normal limits for patient age      IMPRESSION:  No acute cardiopulmonary disease  Lumbar Spine XR 1/21/22  Narrative & Impression   LUMBAR SPINE  INDICATION:   pain  COMPARISON:  None  VIEWS:  XR SPINE LUMBAR 2 OR 3 VIEWS INJURY     FINDINGS:  There are 5 non rib bearing lumbar vertebral bodies  There is no evidence of acute fracture or destructive osseous lesion  Alignment is unremarkable       There is prominent bridging osteophytic bone spur formation along the margins of the lumbar spine with only minimal loss of disc height  Findings could represent ankylosing spondylitis  Correlate with clinical history  The upper portions of the sacroiliac joints are not well defined and could be partially fused    The lower margins seem within normal limits      The pedicles appear intact      There are atherosclerotic calcifications  Soft tissues are otherwise unremarkable      IMPRESSION:  No fracture or malalignment  Possible ankylosing spondylitis  CT Abd/Pelvis w/o contrast 1/23/22  Narrative & Impression   CT ABDOMEN AND PELVIS WITHOUT IV CONTRAST     INDICATION:   Kidney failure, acute;  renal failure      COMPARISON:  None      TECHNIQUE:  CT examination of the abdomen and pelvis was performed without intravenous contrast   Axial, sagittal, and coronal 2D reformatted images were created from the source data and submitted for interpretation       Radiation dose length product (DLP) for this visit:  1189 98 mGy-cm   This examination, like all CT scans performed in the [facility], was performed utilizing techniques to minimize radiation dose exposure, including the use of   iterative reconstruction and automated exposure control       Enteric contrast was not administered       FINDINGS:  ABDOMEN  LOWER CHEST:    There are small bilateral pleural effusions, incompletely visualized, right side larger than left  Atelectasis in the lower lobes of the lungs bilaterally      The heart is enlarged      Mild bilateral gynecomastia      LIVER/BILIARY TREE:    The liver is enlarged with fatty infiltrative changes      GALLBLADDER:  The gallbladder is distended  Increased density within the gallbladder, likely hyperdense sludge  No evidence of gallbladder wall thickening or pericholecystic fluid      SPLEEN:  Unremarkable      PANCREAS:  Unremarkable      ADRENAL GLANDS:  Unremarkable      KIDNEYS/URETERS:  No hydronephrosis or urinary tract calculus  One or more sharply circumscribed subcentimeter renal hypodensities are present, too small to accurately characterize, and statistically most likely benign findings   According to recent   literature (Radiology 2019) no further workup of these findings is recommended      STOMACH AND BOWEL:    Evaluation of the GI tract is limited due to lack of oral contrast material      The stomach is distended and filled with ingested food products and air  Hiatal hernia  Thickening of the distal esophagus, likely reflux esophagitis      No evidence of small bowel obstruction      The colon is segmentally distended with feces  Normal fecal burden throughout the colon  Scattered diverticula in the descending colon and sigmoid colon  Nothing to suggest acute diverticulitis      APPENDIX:  No findings to suggest appendicitis      ABDOMINOPELVIC CAVITY:  No ascites  No pneumoperitoneum      There are mildly enlarged retroperitoneal, mesenteric and pelvic lymph nodes      VESSELS:  Atherosclerotic changes are present  No evidence of aneurysm        PELVIS  REPRODUCTIVE ORGANS:  The prostate is enlarged      URINARY BLADDER:  Mild circumferential bladder wall thickening, likely the sequela of mild bladder outlet obstruction from prostatic enlargement      ABDOMINAL WALL/INGUINAL REGIONS:    Mild diastases of the rectus abdominis musculature      Small umbilical hernia containing fat  Small infraumbilical ventral abdominal wall hernia containing fat      Mild and diffuse anasarca      OSSEOUS STRUCTURES:    The osseous structures are demineralized      There is a minimally displaced, oblique fracture involving the T10 vertebral body (series 601, image 131; series 602, image 108)  Fracture line extends from the superior, left lateral aspect of the T10 vertebral body, obliquely to the inferior articular surface of T10  Fracture line does not appear to extend into the pedicles or facets  There is no bony retropulsion      Disc space narrowing diffusely throughout the lower thoracic and lumbosacral spine  Scattered Schmorl's nodes      Anterior osteophytic spur formation and lateral syndesmophyte formation      IMPRESSION:  1  Minimally displaced, oblique fracture involving the T10 vertebral body  Fracture line does not extend into the posterior elements  No bony retropulsion  Follow-up neurosurgical evaluation recommended  2   Additional incidental findings as described above           Recent Cultures (last 7 days):     Results from last 7 days   Lab Units 01/22/22  1831   C DIFF TOXIN B BY PCR  Negative       Last 24 Hours Medication List:   Current Facility-Administered Medications   Medication Dose Route Frequency Provider Last Rate    acetaminophen  650 mg Oral Q6H PRN Joel Dalton MD      albumin human  25 g Intravenous Q12H Andrea Hubbard MD      allopurinol  100 mg Oral Daily Brianna Liz MD      atorvastatin  80 mg Oral Daily Brianna Liz MD      carvedilol  12 5 mg Oral BID Brianna Liz MD      furosemide  80 mg Intravenous TID (diuretic) Gloria Hubbard MD      heparin (porcine)  5,000 Units Subcutaneous [facility] Brianna Liz MD      HYDROmorphone  1 mg Intravenous Once Brianna Liz MD      HYDROmorphone  1 mg Intravenous Q4H PRN Brianna Liz MD      insulin lispro  1-5 Units Subcutaneous HS Brianna Liz MD      insulin lispro  2-12 Units Subcutaneous TID AC Brianna Liz MD      lidocaine  1 patch Topical Daily Brianna Liz MD      NIFEdipine  30 mg Oral BID Gloria Hubbard MD      ondansetron  4 mg Intravenous Q6H PRN Brianna Liz MD      oxyCODONE  2 5 mg Oral Q6H PRN Brianna Liz MD      oxyCODONE  5 mg Oral Q4H PRN Brianna Liz MD      sodium bicarbonate  1,300 mg Oral TID after meals Gloria Hubbard MD            Today, Patient Was Seen By: HATTIE Alvarez

## 2022-01-24 NOTE — ASSESSMENT & PLAN NOTE
Patient is on Plavix and statin  Continue statin previously Plavix will be held in preparation for kidney biopsy

## 2022-01-24 NOTE — PLAN OF CARE
Problem: Prexisting or High Potential for Compromised Skin Integrity  Goal: Skin integrity is maintained or improved  Description: INTERVENTIONS:  - Identify patients at risk for skin breakdown  - Assess and monitor skin integrity  - Assess and monitor nutrition and hydration status  - Monitor labs   - Assess for incontinence   - Turn and reposition patient  - Assist with mobility/ambulation  - Relieve pressure over bony prominences  - Avoid friction and shearing  - Provide appropriate hygiene as needed including keeping skin clean and dry  - Evaluate need for skin moisturizer/barrier cream  - Collaborate with interdisciplinary team   - Patient/family teaching  - Consider wound care consult   Outcome: Progressing     Problem: MOBILITY - ADULT  Goal: Maintain or return to baseline ADL function  Description: INTERVENTIONS:  -  Assess patient's ability to carry out ADLs; assess patient's baseline for ADL function and identify physical deficits which impact ability to perform ADLs (bathing, care of mouth/teeth, toileting, grooming, dressing, etc )  - Assess/evaluate cause of self-care deficits   - Assess range of motion  - Assess patient's mobility; develop plan if impaired  - Assess patient's need for assistive devices and provide as appropriate  - Encourage maximum independence but intervene and supervise when necessary  - Involve family in performance of ADLs  - Assess for home care needs following discharge   - Consider OT consult to assist with ADL evaluation and planning for discharge  - Provide patient education as appropriate  Outcome: Progressing    Problem: Potential for Falls  Goal: Patient will remain free of falls  Description: INTERVENTIONS:  - Educate patient/family on patient safety including physical limitations  - Instruct patient to call for assistance with activity   - Consult OT/PT to assist with strengthening/mobility   - Keep Call bell within reach  - Keep bed low and locked with side rails adjusted as appropriate  - Keep care items and personal belongings within reach  - Initiate and maintain comfort rounds  - Make Fall Risk Sign visible to staff  - Offer Toileting every 2 Hours, in advance of need  - Initiate/Maintain bed alarm  - Obtain necessary fall risk management equipment:   - Apply yellow socks and bracelet for high fall risk patients  - Consider moving patient to room near nurses station  Outcome: Progressing

## 2022-01-24 NOTE — ASSESSMENT & PLAN NOTE
Patient sustained a fall about 1 week ago slipping on a rug  Patient has been having severe lower back pain since then  Denies any bowel or bladder incontinence, tingling or numbness in the legs  Patient received fentanyl and 2 dose of Dilaudid in the ED with persistent pain  Lumbar x-ray showed no fractures or malalignment with possible ankylosing spondylitis  CT scan of the abdomen pelvis showed minimally displaced oblique fracture involving the T10 vertebral body without extending into the posterior elements without any real bony retropulsion  Discussed with Neurosurgery Dr Devin Burris with pain management  Will get thoracic spine x-ray  Patient ordered for Lidoderm patch and Dilaudid p r n  For breakthrough pain with improved pain    Patient started on oxycodone for moderate and severe pain  Outpatient follow-up with Neurosurgery

## 2022-01-25 LAB
ANION GAP SERPL CALCULATED.3IONS-SCNC: 18 MMOL/L (ref 4–13)
BUN SERPL-MCNC: 84 MG/DL (ref 5–25)
CALCIUM SERPL-MCNC: 7.5 MG/DL (ref 8.3–10.1)
CHLORIDE SERPL-SCNC: 102 MMOL/L (ref 100–108)
CO2 SERPL-SCNC: 16 MMOL/L (ref 21–32)
CREAT SERPL-MCNC: 7.36 MG/DL (ref 0.6–1.3)
ERYTHROCYTE [DISTWIDTH] IN BLOOD BY AUTOMATED COUNT: 14.8 % (ref 11.6–15.1)
GFR SERPL CREATININE-BSD FRML MDRD: 7 ML/MIN/1.73SQ M
GLUCOSE SERPL-MCNC: 103 MG/DL (ref 65–140)
GLUCOSE SERPL-MCNC: 105 MG/DL (ref 65–140)
GLUCOSE SERPL-MCNC: 137 MG/DL (ref 65–140)
GLUCOSE SERPL-MCNC: 143 MG/DL (ref 65–140)
GLUCOSE SERPL-MCNC: 149 MG/DL (ref 65–140)
HCT VFR BLD AUTO: 23.5 % (ref 36.5–49.3)
HGB BLD-MCNC: 7.4 G/DL (ref 12–17)
INR PPP: 1.02 (ref 0.84–1.19)
MAGNESIUM SERPL-MCNC: 1.8 MG/DL (ref 1.6–2.6)
MCH RBC QN AUTO: 29.4 PG (ref 26.8–34.3)
MCHC RBC AUTO-ENTMCNC: 31.5 G/DL (ref 31.4–37.4)
MCV RBC AUTO: 93 FL (ref 82–98)
PHOSPHATE SERPL-MCNC: 7.4 MG/DL (ref 2.3–4.1)
PLATELET # BLD AUTO: 272 THOUSANDS/UL (ref 149–390)
PMV BLD AUTO: 9.8 FL (ref 8.9–12.7)
POTASSIUM SERPL-SCNC: 4.7 MMOL/L (ref 3.5–5.3)
PROTHROMBIN TIME: 13.2 SECONDS (ref 11.6–14.5)
RBC # BLD AUTO: 2.52 MILLION/UL (ref 3.88–5.62)
SODIUM SERPL-SCNC: 136 MMOL/L (ref 136–145)
WBC # BLD AUTO: 7.12 THOUSAND/UL (ref 4.31–10.16)

## 2022-01-25 PROCEDURE — 97167 OT EVAL HIGH COMPLEX 60 MIN: CPT

## 2022-01-25 PROCEDURE — 83516 IMMUNOASSAY NONANTIBODY: CPT | Performed by: INTERNAL MEDICINE

## 2022-01-25 PROCEDURE — 82948 REAGENT STRIP/BLOOD GLUCOSE: CPT

## 2022-01-25 PROCEDURE — 84100 ASSAY OF PHOSPHORUS: CPT | Performed by: INTERNAL MEDICINE

## 2022-01-25 PROCEDURE — 83735 ASSAY OF MAGNESIUM: CPT | Performed by: INTERNAL MEDICINE

## 2022-01-25 PROCEDURE — 99232 SBSQ HOSP IP/OBS MODERATE 35: CPT | Performed by: FAMILY MEDICINE

## 2022-01-25 PROCEDURE — 85027 COMPLETE CBC AUTOMATED: CPT | Performed by: INTERNAL MEDICINE

## 2022-01-25 PROCEDURE — 99232 SBSQ HOSP IP/OBS MODERATE 35: CPT | Performed by: INTERNAL MEDICINE

## 2022-01-25 PROCEDURE — 80048 BASIC METABOLIC PNL TOTAL CA: CPT | Performed by: INTERNAL MEDICINE

## 2022-01-25 PROCEDURE — 85610 PROTHROMBIN TIME: CPT | Performed by: INTERNAL MEDICINE

## 2022-01-25 RX ORDER — FUROSEMIDE 10 MG/ML
80 INJECTION INTRAMUSCULAR; INTRAVENOUS
Status: DISCONTINUED | OUTPATIENT
Start: 2022-01-25 | End: 2022-01-28

## 2022-01-25 RX ORDER — ALBUMIN (HUMAN) 12.5 G/50ML
25 SOLUTION INTRAVENOUS
Status: DISCONTINUED | OUTPATIENT
Start: 2022-01-25 | End: 2022-01-28

## 2022-01-25 RX ORDER — HEPARIN SODIUM 5000 [USP'U]/ML
5000 INJECTION, SOLUTION INTRAVENOUS; SUBCUTANEOUS ONCE
Status: COMPLETED | OUTPATIENT
Start: 2022-01-25 | End: 2022-01-25

## 2022-01-25 RX ORDER — HYDROMORPHONE HCL/PF 1 MG/ML
0.5 SYRINGE (ML) INJECTION EVERY 4 HOURS PRN
Status: DISCONTINUED | OUTPATIENT
Start: 2022-01-25 | End: 2022-02-02 | Stop reason: HOSPADM

## 2022-01-25 RX ORDER — ACETAMINOPHEN 325 MG/1
650 TABLET ORAL EVERY 6 HOURS SCHEDULED
Status: DISCONTINUED | OUTPATIENT
Start: 2022-01-25 | End: 2022-02-02 | Stop reason: HOSPADM

## 2022-01-25 RX ORDER — OXYCODONE HYDROCHLORIDE 5 MG/1
5 TABLET ORAL EVERY 6 HOURS PRN
Status: DISCONTINUED | OUTPATIENT
Start: 2022-01-25 | End: 2022-02-02 | Stop reason: HOSPADM

## 2022-01-25 RX ORDER — SEVELAMER HYDROCHLORIDE 800 MG/1
800 TABLET, FILM COATED ORAL
Status: DISCONTINUED | OUTPATIENT
Start: 2022-01-25 | End: 2022-02-02 | Stop reason: HOSPADM

## 2022-01-25 RX ADMIN — FUROSEMIDE 80 MG: 10 INJECTION, SOLUTION INTRAMUSCULAR; INTRAVENOUS at 18:42

## 2022-01-25 RX ADMIN — ACETAMINOPHEN 650 MG: 325 TABLET, FILM COATED ORAL at 18:07

## 2022-01-25 RX ADMIN — NIFEDIPINE 30 MG: 30 TABLET, FILM COATED, EXTENDED RELEASE ORAL at 08:05

## 2022-01-25 RX ADMIN — ATORVASTATIN CALCIUM 80 MG: 80 TABLET, FILM COATED ORAL at 08:05

## 2022-01-25 RX ADMIN — HEPARIN SODIUM 5000 UNITS: 5000 INJECTION INTRAVENOUS; SUBCUTANEOUS at 13:27

## 2022-01-25 RX ADMIN — FUROSEMIDE 80 MG: 10 INJECTION, SOLUTION INTRAMUSCULAR; INTRAVENOUS at 05:41

## 2022-01-25 RX ADMIN — CARVEDILOL 12.5 MG: 12.5 TABLET, FILM COATED ORAL at 08:05

## 2022-01-25 RX ADMIN — HEPARIN SODIUM 5000 UNITS: 5000 INJECTION INTRAVENOUS; SUBCUTANEOUS at 05:40

## 2022-01-25 RX ADMIN — CARVEDILOL 12.5 MG: 12.5 TABLET, FILM COATED ORAL at 18:11

## 2022-01-25 RX ADMIN — SEVELAMER HYDROCHLORIDE 800 MG: 800 TABLET, FILM COATED PARENTERAL at 18:07

## 2022-01-25 RX ADMIN — SODIUM BICARBONATE 650 MG TABLET 1300 MG: at 08:05

## 2022-01-25 RX ADMIN — NIFEDIPINE 30 MG: 30 TABLET, FILM COATED, EXTENDED RELEASE ORAL at 18:11

## 2022-01-25 RX ADMIN — OXYCODONE HYDROCHLORIDE 5 MG: 5 TABLET ORAL at 13:27

## 2022-01-25 RX ADMIN — LIDOCAINE 5% 1 PATCH: 700 PATCH TOPICAL at 08:05

## 2022-01-25 RX ADMIN — SODIUM BICARBONATE 650 MG TABLET 1300 MG: at 18:42

## 2022-01-25 RX ADMIN — SODIUM BICARBONATE 650 MG TABLET 1300 MG: at 13:26

## 2022-01-25 RX ADMIN — HYDROMORPHONE HYDROCHLORIDE 0.5 MG: 1 INJECTION, SOLUTION INTRAMUSCULAR; INTRAVENOUS; SUBCUTANEOUS at 01:26

## 2022-01-25 RX ADMIN — ALBUMIN (HUMAN) 25 G: 0.25 INJECTION, SOLUTION INTRAVENOUS at 18:06

## 2022-01-25 RX ADMIN — OXYCODONE HYDROCHLORIDE 5 MG: 5 TABLET ORAL at 20:42

## 2022-01-25 RX ADMIN — SEVELAMER HYDROCHLORIDE 800 MG: 800 TABLET, FILM COATED PARENTERAL at 12:26

## 2022-01-25 RX ADMIN — ACETAMINOPHEN 650 MG: 325 TABLET, FILM COATED ORAL at 13:26

## 2022-01-25 RX ADMIN — HEPARIN SODIUM 5000 UNITS: 5000 INJECTION INTRAVENOUS; SUBCUTANEOUS at 22:25

## 2022-01-25 RX ADMIN — HYDROMORPHONE HYDROCHLORIDE 0.5 MG: 1 INJECTION, SOLUTION INTRAMUSCULAR; INTRAVENOUS; SUBCUTANEOUS at 09:40

## 2022-01-25 RX ADMIN — ALLOPURINOL 100 MG: 100 TABLET ORAL at 08:05

## 2022-01-25 RX ADMIN — HYDROMORPHONE HYDROCHLORIDE 0.5 MG: 1 INJECTION, SOLUTION INTRAMUSCULAR; INTRAVENOUS; SUBCUTANEOUS at 05:40

## 2022-01-25 NOTE — PROGRESS NOTES
[facility] Internal [facility] Progress Note  Patient: Joni Hankins 57 y o  male   MRN: [id number]  PCP: Oksana Berman MD  Unit/Bed#: [address] [place] [address] Encounter: [id number]  Date Of Visit: 01/25/22    Problem List:    Principal Problem:    Acute kidney injury superimposed on chronic kidney disease ([facility] [address] )  Active Problems:    T10 vertebral fracture ([facility] )    Diabetes mellitus ([facility] )    Hypertension    TIA (transient ischemic attack)    Anemia      Assessment & Plan:    * Acute kidney injury superimposed on chronic kidney disease [facility])  Assessment & Plan  Lab Results   Component Value Date    EGFR 7 01/25/2022    EGFR 7 01/24/2022    EGFR 8 01/23/2022    CREATININE 7 36 (H) 01/25/2022    CREATININE 6 93 (H) 01/24/2022    CREATININE 6 41 (H) 01/23/2022     Patient has history of CKD stage IV  Baseline creatinine around 4 3-4 7  Admission creatinine was 5 79  Patient also noted to have significant anasarca with minimally elevated potassium at 5 1  Etiology suspected to be acute kidney injury related to possible cardiorenal syndrome, use of NSAIDs or progression of CKD  Patient's previous SPEP/UPEP were negative and chronic hepatitis panel was negative  C3-C4 not low and NUIRA was negative  ANCA panel and anti GBM antibodies were ordered by Nephrology; pending  CT abdomen/pelvis 1/23 showed no hydronephrosis or urinary tract calculus  UA showed trace amount of blood and protein  Per nephrology, discontinued Actos as it can cause fluid retention  Avoid nephrotoxic agents and hypotension  After consult with IR and Nephrology, tentatively scheduled for kidney biopsy 1/31; continue to hold Plavix; hold heparin 6 hours prior to procedure  Nephrology suggesting DDAVP 20 micrograms IV x1 30 minutes prior to biopsy  Creatinine has increased to 7 36 today  Lasix decreased to 80mg IV BID   Continue IV albumin 25g Q12h    T10 vertebral fracture [facility])  Assessment & Plan  Patient reported severe low back pain x1 week prior to admission after slipping on a rug  Denies any bowel or bladder incontinence, tingling or numbness in the legs  Patient received fentanyl and 2 dose of Dilaudid in the ED with persistent pain  Lumbar x-ray showed no fractures or malalignment with possible ankylosing spondylitis  CT abdomen/pelvis 1/23 showed minimally displaced oblique fracture of the T10 vertebral body without extending into the posterior elements without any real bony retropulsion  Thoracic spine x-ray 1/24 does not visualize fracture well  Dr Jame Ahumada discussed with Neurosurgery Dr Rohan Arvizu and outpatient follow-up with neurosurgery  Continue acetaminophen 650 mg Q6h and Lidoderm 5% patch daily for pain management - d/w patient   Continue oxycodone 5mg along with 2 mg Q6h PRN and Dilaudid 0 5mg Q4h PRN breakthrough pain - d/w patient and will adjust meds based on response    Diabetes mellitus [facility])  Assessment & Plan  Lab Results   Component Value Date    HGBA1C 6 6 (H) 01/24/2022       Recent Labs     01/24/22  1608 01/24/22  2119 01/25/22  0718 01/25/22  1133   POCGLU 142* 119 103 137     Holding home linagliptin and Actos  Continue Humalog sliding scale with Accu-Cheks q a c  and HS  Blood sugars stable  Anemia  Assessment & Plan  Results from last 7 days   Lab Units 01/25/22  0548 01/24/22  0530 01/23/22  0504 01/22/22  0804 01/21/22  1218   HEMOGLOBIN g/dL 7 4* 7 5* 7 8* 8 7* 10 0*   HEMATOCRIT % 23 5* 24 0* 25 7* 28 0* 32 2*   MCV fL 93 94 96 94 93     Patient's hemoglobin dropped from 10-7 8  MCV normal   Baseline hemoglobin appears to be 8-8 5  Admission hemoglobin level of 10 questionable for accuracy    No evidence of active bleeding  Iron panel showed iron level of 34, TIBC of 163 and ferritin level of 61 suggesting iron deficiency-pending stool occult blood  Transfuse PRBC if hemoglobin drops < 7  Given his history of TIA to avoid SURINDER for now    TIA (transient ischemic attack)  Assessment & Plan  Continue home statin  Hold Plavix in preparation for kidney biopsy     Hypertension  Assessment & Plan  Continue home nifedipine 30 mg PO BID and Coreg 12 5 mg PO BID with holding parameters  Diarrhea-resolved as of 2022  Assessment & Plan  Patient complained of some diarrhea after admission  Diarrhea has since resolved  Stool for bacterial panel and C diff toxin were negative  CT scan of the abdomen pelvis showed no acute pathology  Abdominal exam continues to be benign             VTE Pharmacologic Prophylaxis:   Heparin    Patient Centered Rounds: I performed bedside rounds with nursing staff today  Discussions with Specialists or Other Care Team Provider: yes - renal    Education and Discussions with Family / Patient: Patient declined call to   Time Spent for Care: 35 min  More than 50% of total time spent on counseling and coordination of care as described above  Current Length of Stay: 4 day(s)  Current Patient Status: Inpatient   Certification Statement: The patient will continue to require additional inpatient hospital stay due to acute kidney injury  Discharge Plan: Anticipate discharge in >72 hrs to discharge location to be determined pending rehab evaluations  Code Status: Level 1 - Full Code    Subjective:   Patient without new complaints today  Currently sitting up in bed with back brace  Reports moderate pain partially relieved with PRN analgesics  He reports that lower extremity edema is improving, but limbs still feel "heavy"  Denies extremity pain  Denies CP/palpitations, dyspnea, abd pain/N/V/D  Objective:     Vitals:   Temp (24hrs), Av °F (36 7 °C), Min:97 9 °F (36 6 °C), Max:98 1 °F (36 7 °C)    Temp:  [97 9 °F (36 6 °C)-98 1 °F (36 7 °C)] 97 9 °F (36 6 °C)  HR:  [70] 70  Resp:  [18-20] 18  BP: (136-151)/(60-65) 151/65  SpO2:  [92 %-93 %] 92 %  Body mass index is 37 81 kg/m²  Input and Output Summary (last 24 hours):      Intake/Output Summary (Last 24 hours) at 1/25/2022 1627  Last data filed at 1/25/2022 1027  Gross per 24 hour   Intake 350 ml   Output 1250 ml   Net -900 ml       Physical Exam:   Physical Exam  Constitutional:       General: He is not in acute distress  Appearance: He is obese  HENT:      Head: Normocephalic and atraumatic  Eyes:      Extraocular Movements: Extraocular movements intact  Conjunctiva/sclera: Conjunctivae normal    Cardiovascular:      Rate and Rhythm: Normal rate and regular rhythm  Pulmonary:      Effort: Pulmonary effort is normal  No respiratory distress  Breath sounds: Normal breath sounds  No wheezing, rhonchi or rales  Abdominal:      Comments: + back brace in place   Musculoskeletal:      Right lower leg: Edema present  Left lower leg: Edema present  Skin:     General: Skin is dry  Neurological:      Mental Status: He is alert and oriented to person, place, and time  Additional Data:     Labs:  Results from last 7 days   Lab Units 01/25/22  0548 01/24/22  0530 01/23/22  0504   WBC Thousand/uL 7 12   < > 5 17   HEMOGLOBIN g/dL 7 4*   < > 7 8*   HEMATOCRIT % 23 5*   < > 25 7*   PLATELETS Thousands/uL 272   < > 289   NEUTROS PCT %  --   --  57   LYMPHS PCT %  --   --  22   MONOS PCT %  --   --  16*   EOS PCT %  --   --  3    < > = values in this interval not displayed  Results from last 7 days   Lab Units 01/25/22  0548 01/22/22  0804 01/21/22  1218   SODIUM mmol/L 136   < > 140   POTASSIUM mmol/L 4 7   < > 5 1   CHLORIDE mmol/L 102   < > 108   CO2 mmol/L 16*   < > 19*   BUN mg/dL 84*   < > 63*   CREATININE mg/dL 7 36*   < > 5 79*   ANION GAP mmol/L 18*   < > 13   CALCIUM mg/dL 7 5*   < > 7 9*   ALBUMIN g/dL  --   --  2 3*   TOTAL BILIRUBIN mg/dL  --   --  0 19*   ALK PHOS U/L  --   --  104   ALT U/L  --   --  32   AST U/L  --   --  37   GLUCOSE RANDOM mg/dL 105   < > 149*    < > = values in this interval not displayed       Results from last 7 days   Lab Units 01/25/22  0548   INR  1 02 Results from last 7 days   Lab Units 01/25/22  1133 01/25/22  0718 01/24/22  2119 01/24/22  1608 01/24/22  1114 01/24/22  0804 01/23/22  2158 01/23/22  1546 01/23/22  1136 01/23/22  0708 01/22/22  2142 01/22/22  1142   POC GLUCOSE mg/dl 137 103 119 142* 129 111 119 121 107 78 104 117     Results from last 7 days   Lab Units 01/24/22  0530   HEMOGLOBIN A1C % 6 6*           Lines/Drains:  Invasive Devices  Report    Peripheral Intravenous Line            Peripheral IV 01/24/22 Dorsal (posterior); Right Hand 1 day                      Imaging: Reviewed radiology reports from this admission including: abdominal/pelvic CT    Recent Cultures (last 7 days):   Results from last 7 days   Lab Units 01/22/22  1831   C DIFF TOXIN B BY PCR  Negative       Last 24 Hours Medication List:   Current Facility-Administered Medications   Medication Dose Route Frequency Provider Last Rate    acetaminophen  650 mg Oral Q6H [address] Lore Revastrid,       albumin human  25 g Intravenous BID (diuretic) Anshul Stauffer MD      allopurinol  100 mg Oral Daily Zofia Lopez MD      atorvastatin  80 mg Oral Daily Zofia Lopez MD      carvedilol  12 5 mg Oral BID Zofia Lopez MD      furosemide  80 mg Intravenous BID (diuretic) Anshul Stauffer MD      heparin (porcine)  5,000 Units Subcutaneous [facility] Zofia Lopez MD      HYDROmorphone  0 5 mg Intravenous Q4H PRN Lore Silver DO      HYDROmorphone  1 mg Intravenous Once Zofia Lopez MD      insulin lispro  1-5 Units Subcutaneous HS Zofia Lopez MD      insulin lispro  2-12 Units Subcutaneous TID AC Zofia Lopez MD      lidocaine  1 patch Topical Daily Zofia Lopez MD      NIFEdipine  30 mg Oral BID Jacek Altman MD      ondansetron  4 mg Intravenous Q6H PRN Zofia Lopez MD      oxyCODONE  2 5 mg Oral Q6H PRN Zofia Lopez MD      oxyCODONE  5 mg Oral Q6H PRN Lore Silver DO      sevelamer  800 mg Oral TID With Meals Rolando Garcia MD      sodium bicarbonate  1,300 mg Oral TID after meals Faina Manzo MD          Today, Patient Was Seen By: Milla Elliott DO    ** Please Note: "This note has been constructed using a voice recognition system  Therefore there may be syntax, spelling, and/or grammatical errors   Please call if you have any questions  "**

## 2022-01-25 NOTE — ASSESSMENT & PLAN NOTE
Patient reported severe low back pain x1 week prior to admission after slipping on a rug  He denies any bowel or bladder incontinence, tingling or numbness in the legs  · Patient received fentanyl and 2 doses of Dilaudid in the ED with persistent pain  · Lumbar x-ray showed no fractures or malalignment with possible ankylosing spondylitis  · CT abdomen/pelvis 1/23 showed minimally displaced oblique fracture of the T10 vertebral body without extending into the posterior elements without any real bony retropulsion  · Thoracic spine x-ray 1/24 does not visualize fracture well  · Dr Anne Alvarado discussed with Neurosurgery Dr Falguni Mckenzie - recommending TSLO brace and outpatient follow-up with neurosurgery  · Continue acetaminophen 650 mg Q6h prn, Lidoderm 5% patch daily for pain management  · Continue oxycodone 5mg along with 2 mg Q6h PRN and Dilaudid 0 5mg Q4h PRN breakthrough pain

## 2022-01-25 NOTE — PROGRESS NOTES
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  Losartan and chlorthalidone were held  Biopsy was planned but could not be completed due to body habitus   -admission creatinine 5 8 mg/dL  -patient was initially started on furosemide IV with albumin  -urinalysis with 0-1 RBC, 0-1 WBC, 2+ protein  -urine protein creatinine ratio 10 7 g  -CT scan-kidneys without hydronephrosis, renal hypodensities too small to characterize likely benign  Minimally displaced oblique fracture involving T10   -creatinine rising 6 4 mg/dL on January 23rd  -SPEP, UPEP-unrevealing prior  -chronic hepatitis panel-negative prior  -C3, C4 was not low prior  -NURIA negative  -ANCA-  -anti phospholipase A2 antibody receptor-  -anti GBM-  -A1c is 6 6%     Etiology of acute kidney injury is unclear  May need to consider membranous versus minimal change versus other  Does not appear nephritic  A1c is well controlled  To note, patient was using NSAIDs 3 tablets 3 to 4 times a day for 1 week prior to admission  Therefore there may be a component of NSAID induced nephropathy also     1/24-creatinine continues to rise to 6 9 mg/dL  Patient has nephrotic range proteinuria  No hematuria  No hydronephrosis    Was on furosemide 3 times a day    -1/2-creatinine continues to rise is 7 4 mg/dL  Lower furosemide slightly     Plan     -I/O; avoid nephrotoxic agents  -avoid hypotension  -follow-up Anca, anti GBM antibodies,  -pending MEGHANA 2R  -continue to  hold Plavix due to potential biopsy later this week or early next week  -agree with continuing to hold Actos  -change Lasix to twice a day today with albumin twice a day  -check stool occult  -  radiology team is on board for biopsy potentially next Monday  -biopsy form sent and Tiger text sent to confirm  -reviewed with the patient that likely will need dialysis as early as this week to be initiated  Patient states that wants to discuss that further when that time approaches   -patient is agreeable to be NPO overnight in case needs dialysis catheter tomorrow (to know last dose of Plavix was 1/24)  -may need blood transfusion prior to biopsy  -avoid all NSAIDs as doing  - will need DDAVP prior to biopsy 30 minutes before -20 mcg IV 1 time  - will need to be NPO Rolando night into Monday  - AM heparin sq will need to be held Monday AM for biopsy (1/31)  -please also hold a m  Heparin subQ on 01/26 in case patient needs dialysis catheter placement     2) back pain     -patient had fall at home 1 week prior to admission  -per primary team  -pain control per primary team     3) hypertension-     -on nifedipine and carvedilol     4) acid/base-     -bicarbonate lower at 16 and started on bicarbonate tablets and serum bicarbonate 17 on 01/24  -on bicarbonate tablets-continue     5) anemia-     -hemoglobin low at 7 4  -iron panel-iron saturation 20%  -received Venofer prior admission in November  -history of TIA, will avoid SURINDER for now  -decreasing hemoglobin is likely a factor of CKD but initial hemoglobin was 10 on 01/21 and now has decreased to 7 5    Unclear accuracy of 10  -transfusion per primary team  -iron panel with iron saturation 21%     6) T10 vertebral fracture-per primary team     7) electrolytes-sodium is stable, potassium is stable    SUBJECTIVE / 24H INTERVAL HISTORY:  Patient with significant back pain currently  Denies other complaints  Continues to have significant edema lower extremities      OBJECTIVE:  Current Weight: Weight - Scale: 130 kg (286 lb 9 6 oz)  Vitals:    01/24/22 1948 01/24/22 2252 01/25/22 0544 01/25/22 0800   BP: 142/64 140/64 139/63 136/60   BP Location: Left arm Left arm     Pulse: 70 70     Resp: 20 18 19   Temp: 98 °F (36 7 °C) 98 1 °F (36 7 °C)  97 9 °F (36 6 °C)   TempSrc: Oral Oral     SpO2: 93% 92%     Weight:       Height:           Intake/Output Summary (Last 24 hours) at 1/25/2022 0900  Last data filed at 1/25/2022 0548  Gross per 24 hour   Intake 350 ml   Output 1700 ml   Net -1350 ml     General: NAD  Skin: no rash  Eyes: anicteric sclera  ENT: moist mucous membrane  Neck: supple  Chest: CTA b/l, no ronchii, no wheeze, no rubs, no rales  CVS: s1s2, no murmur, no gallop, no rub  Abdomen: soft, nontender, nl sounds  Extremities:  2+ edema LE b/l to proximal 5  : no gaitan  Neuro: AAOX3  Psych: normal affect    Medications:    Current Facility-Administered Medications:     acetaminophen (TYLENOL) tablet 650 mg, 650 mg, Oral, Q6H PRN, Ileana Dalton MD    albumin human (FLEXBUMIN) 25 % injection 25 g, 25 g, Intravenous, Q12H [facility], Naima Franco MD, 25 g at 01/24/22 2122    allopurinol (ZYLOPRIM) tablet 100 mg, 100 mg, Oral, Daily, Joel Dalton MD, 100 mg at 01/25/22 0805    atorvastatin (LIPITOR) tablet 80 mg, 80 mg, Oral, Daily, Joel Dalton MD, 80 mg at 01/25/22 0805    carvedilol (COREG) tablet 12 5 mg, 12 5 mg, Oral, BID, Joel Dalton MD, 12 5 mg at 01/25/22 0805    furosemide (LASIX) injection 80 mg, 80 mg, Intravenous, BID (diuretic), Fili Molina MD    heparin (porcine) subcutaneous injection 5,000 Units, 5,000 Units, Subcutaneous, Q8H [facility], 5,000 Units at 01/25/22 0540 **AND** Platelet count, , , Once, Arianna Restrepo MD    HYDROmorphone (DILAUDID) injection 0 5 mg, 0 5 mg, Intravenous, Q4H PRN, GRANT Herrera, 0 5 mg at 01/25/22 0540    HYDROmorphone (DILAUDID) injection 1 mg, 1 mg, Intravenous, Once, Arianna Restrepo MD    insulin lispro (HumaLOG) 100 units/mL subcutaneous injection 1-5 Units, 1-5 Units, Subcutaneous, HS, Arianna Restrepo MD    insulin lispro (HumaLOG) 100 units/mL subcutaneous injection 2-12 Units, 2-12 Units, Subcutaneous, TID AC, 2 Units at 01/21/22 1744 **AND** Fingerstick Glucose (POCT), , , TID AC, Arianna Restrepo MD    lidocaine (LIDODERM) 5 % patch 1 patch, 1 patch, Topical, Daily, Arianna Restrepo MD, 1 patch at 01/25/22 0805    NIFEdipine (PROCARDIA XL) 24 hr tablet 30 mg, 30 mg, Oral, BID, Rashid Crawford MD, 30 mg at 01/25/22 0805    ondansetron (ZOFRAN) injection 4 mg, 4 mg, Intravenous, Q6H PRN, Arianna Restrepo MD    oxyCODONE (ROXICODONE) IR tablet 2 5 mg, 2 5 mg, Oral, Q6H PRN, Arianna Restrepo MD    sodium bicarbonate tablet 1,300 mg, 1,300 mg, Oral, TID after meals, Rashid Crawford MD, 1,300 mg at 01/25/22 0805    Laboratory Results:  Results from last 7 days   Lab Units 01/25/22  0548 01/24/22  0530 01/23/22  0504 01/22/22  0804 01/21/22  1218   WBC Thousand/uL 7 12 6 67 5 17 6 53 8 38   HEMOGLOBIN g/dL 7 4* 7 5* 7 8* 8 7* 10 0*   HEMATOCRIT % 23 5* 24 0* 25 7* 28 0* 32 2*   PLATELETS Thousands/uL 272 284 289 302 347   POTASSIUM mmol/L 4 7 4 6 4 6 4 5 5 1   CHLORIDE mmol/L 102 102 104 104 108   CO2 mmol/L 16* 17* 16* 17* 19*   BUN mg/dL 84* 76* 70* 70* 63*   CREATININE mg/dL 7 36* 6 93* 6 41* 5 79* 5 79*   CALCIUM mg/dL 7 5* 7 6* 7 7* 7 5* 7 9*   MAGNESIUM mg/dL 1 8  --   --   --  2 1   PHOSPHORUS mg/dL 7 4*  --   --   --   --

## 2022-01-25 NOTE — ASSESSMENT & PLAN NOTE
Patient complained of some diarrhea after admission  Diarrhea has since resolved    · Stool for bacterial panel and C diff toxin were negative  · CT scan of the abdomen pelvis showed no acute pathology  · Abdominal exam has been benign

## 2022-01-25 NOTE — PLAN OF CARE
Problem: Prexisting or High Potential for Compromised Skin Integrity  Goal: Skin integrity is maintained or improved  Description: INTERVENTIONS:  - Identify patients at risk for skin breakdown  - Assess and monitor skin integrity  - Assess and monitor nutrition and hydration status  - Monitor labs   - Assess for incontinence   - Assist with mobility/ambulation  - Relieve pressure over bony prominences  - Avoid friction and shearing  - Provide appropriate hygiene as needed including keeping skin clean and dry  - Evaluate need for skin moisturizer/barrier cream  - Collaborate with interdisciplinary team   - Patient/family teaching  - Consider wound care consult   Outcome: Progressing     Problem: MOBILITY - ADULT  Goal: Maintain or return to baseline ADL function  Description: INTERVENTIONS:  -  Assess patient's ability to carry out ADLs; assess patient's baseline for ADL function and identify physical deficits which impact ability to perform ADLs (bathing, care of mouth/teeth, toileting, grooming, dressing, etc )  - Assess/evaluate cause of self-care deficits   - Assess range of motion  - Assess patient's mobility; develop plan if impaired  - Assess patient's need for assistive devices and provide as appropriate  - Encourage maximum independence but intervene and supervise when necessary  - Involve family in performance of ADLs  - Assess for home care needs following discharge   - Consider OT consult to assist with ADL evaluation and planning for discharge  - Provide patient education as appropriate  Outcome: Progressing     Problem: Potential for Falls  Goal: Patient will remain free of falls  Description: INTERVENTIONS:  - Educate patient/family on patient safety including physical limitations  - Instruct patient to call for assistance with activity   - Consult OT/PT to assist with strengthening/mobility   - Keep Call bell within reach  - Keep bed low and locked with side rails adjusted as appropriate  - Keep care items and personal belongings within reach  - Initiate and maintain comfort rounds  - Make Fall Risk Sign visible to staff  - Offer Toileting every 2 Hours, in advance of need  - Initiate/Maintain bed/ chair alarm  - Apply yellow socks and bracelet for high fall risk patients  - Consider moving patient to room near nurses station  Outcome: Progressing

## 2022-01-25 NOTE — OCCUPATIONAL THERAPY NOTE
OT EVALUATION       01/25/22 1015   Note Type   Note type Evaluation   Restrictions/Precautions   Braces or Orthoses TLSO  (for T10 fx)   Other Precautions Contact/isolation; Chair Alarm; Bed Alarm; Fall Risk;Pain   Pain Assessment   Pain Assessment Tool 0-10   Pain Score 4   Pain Location/Orientation Location: Back   Home Living   Type of Home Apartment   Home Layout One level  (6 [address] )   Prior Function   Level of Roberts Independent with ADLs and functional mobility   Lives With Alone   Receives Help From Family   ADL Assistance Independent   IADLs Independent   ADL   Eating Assistance 7  Independent   Grooming Assistance 7  Independent   UB Bathing Assistance 4  Minimal Assistance   LB Bathing Assistance 3  Moderate Assistance   UB Dressing Assistance 4  Minimal Assistance   LB Dressing Assistance 3  Moderate Assistance   Toileting Assistance  4  Minimal Assistance   Transfers   Sit to Stand 4  Minimal assistance   Stand to Sit 4  Minimal assistance   Functional Mobility   Functional Mobility 4  Minimal assistance   Additional Comments 25 feet   Additional items Rolling walker   Balance   Static Sitting Good   Dynamic Sitting Fair +   Static Standing Fair   Dynamic Standing Fair -   Activity Tolerance   Activity Tolerance Patient limited by fatigue;Patient limited by pain   Nurse Made Aware yes, Janelle   RUE Assessment   RUE Assessment WFL   LUE Assessment   LUE Assessment WFL   Cognition   Overall Cognitive Status WFL   Arousal/Participation Cooperative   Attention Within functional limits   Orientation Level Oriented X4   Following Commands Follows all commands and directions without difficulty   Assessment   Limitation Decreased ADL status; Decreased UE strength;Decreased Safe judgement during ADL;Decreased endurance;Decreased self-care trans;Decreased high-level ADLs  (decreased balance and mobility )   Prognosis Good   Assessment Patient evaluated by Occupational Therapy    Patient admitted with Acute kidney injury superimposed on chronic kidney disease ([facility] [address] )  The patients occupational profile, medical and therapy history includes a extensive additional review of physical, cognitive, or psychosocial history related to current functional performance  Comorbidities affecting functional mobility and ADLS include: diabetes, hypertension and TIA and left toe amputation  Prior to admission, patient was independent with functional mobility without assistive device, independent with ADLS and independent with IADLS  The evaluation identifies the following performance deficits: weakness, impaired balance, decreased endurance, increased fall risk, new onset of impairment of functional mobility, decreased ADLS, decreased IADLS, pain, decreased activity tolerance, decreased safety awareness, impaired judgement and decreased strength, that result in activity limitations and/or participation restrictions  This evaluation requires clinical decision making of high complexity, because the patient presents with comorbidites that affect occupational performance and required significant modification of tasks or assistance with consideration of multiple treatment options  The Barthel Index was used as a functional outcome tool presenting with a score of 65, indicating moderate limitations of functional mobility and ADLS  The patient's raw score on the -PAC Daily Activity inpatient short form is 17, standardized score is 37 26, less than 39 4  Patients at this level are likely to benefit from DC to post-acute rehabilitation services  Please refer to the recommendation of the Occupational Therapist for safe DC planning  Patient will benefit from skilled Occupational Therapy services to address above deficits and facilitate a safe return to prior level of function     Goals   Patient Goals to feel better and go home    STG Time Frame   (1-7 days)   Short Term Goal  Goals established to promote patient goal of going home and to feel better:  Patient will increase standing tolerance to 3 minutes during ADL task to decrease assistance level and decrease fall risk; Patient will increase bed mobility to supervision in preparation for ADLS and transfers; Patient will increase functional mobility to and from bathroom with rolling walker with supervision to increase performance with ADLS and to use a toilet; Patient will tolerate 10 minutes of UE ROM/strengthening to increase general activity tolerance and performance in ADLS/IADLS; Patient will improve functional activity tolerance to 10 minutes of sustained functional tasks to increase participation in basic self-care and decrease assistance level;  Patient will be able to to verbalize understanding and perform energy conservation/proper body mechanics during ADLS and functional mobility at least 75% of the time with minimal cueing to decrease signs of fatigue and increase stamina to return to prior level of function; Patient will increase dynamic sitting balance to good to improve the ability to sit at edge of bed or on a chair for ADLS;  Patient will increase dynamic standing balance to fair to improve postural stability and decrease fall risk during standing ADLS and transfers  LTG Time Frame   (8-14 days)   Long Term Goal Patient will increase standing tolerance to 6 minutes during ADL task to decrease assistance level and decrease fall risk; Patient will increase bed mobility to independent in preparation for ADLS and transfers;  Patient will increase functional mobility to and from bathroom with rolling walker independently to increase performance with ADLS and to use a toilet; Patient will tolerate 20 minutes of UE ROM/strengthening to increase general activity tolerance and performance in ADLS/IADLS; Patient will improve functional activity tolerance to 0 minutes of sustained functional tasks to increase participation in basic self-care and decrease assistance level;  Patient will be able to to verbalize understanding and perform energy conservation/proper body mechanics during ADLS and functional mobility at least 90% of the time to decrease signs of fatigue and increase stamina to return to prior level of function;  Patient will increase dynamic standing balance to fair+ to improve postural stability and decrease fall risk during standing ADLS and transfers  Pt will score >/= 21/24 on AM-PAC Daily Activity Inpatient scale to promote safe independence with ADLs and functional mobility; Pt will score >/= 95/100 on Barthel Index in order to decrease caregiver assistance needed and increase ability to perform ADLs and functional mobility  Functional Transfer Goals   Pt Will Perform All Functional Transfers   (STG supervision LTG Independent )   ADL Goals   Pt Will Perform Grooming Standing at sink  (STG supervision LTG Independent )   Pt Will Perform Bathing   (STG min assist LTG supervision )   Pt Will Perform UE Dressing   (STG supervision LTG Independent )   Pt Will Perform LE Dressing   (STG min assist LTG supervision )   Pt Will Perform Toileting   (STG supervision LTG Independent )   Plan   Treatment Interventions ADL retraining;Functional transfer training; Endurance training;UE strengthening/ROM; Patient/family training;Equipment evaluation/education; Activityengagement; Compensatory technique education   Goal Expiration Date 02/08/22   OT Frequency 3-5x/wk   Recommendation   OT Discharge Recommendation Post acute rehabilitation services   AM-PAC Daily Activity Inpatient   Lower Body Dressing 2   Bathing 2   Toileting 3   Upper Body Dressing 3   Grooming 3   Eating 4   Daily Activity Raw Score 17   Daily Activity Standardized Score (Calc for Raw Score >=11) 37 26   AM-PAC Applied Cognition Inpatient   Following a Speech/Presentation 4   Understanding Ordinary Conversation 4   Taking Medications 4   Remembering Where Things Are Placed or Put Away 4   Remembering List of 4-5 Errands 4 Taking Care of Complicated Tasks 4   Applied Cognition Raw Score 24   Applied Cognition Standardized Score 62 21   Barthel Index   Feeding 10   Bathing 0   Grooming Score 0   Dressing Score 5   Bladder Score 10   Bowels Score 10   Toilet Use Score 5   Transfers (Bed/Chair) Score 10   Mobility (Level Surface) Score 10   Stairs Score 5   Barthel Index Score 72   Licensure   [place] License Number  Romina Flanagan Luth[address] 86 OTR/L [id number]

## 2022-01-25 NOTE — ASSESSMENT & PLAN NOTE
Lab Results   Component Value Date    EGFR 11 02/02/2022    EGFR 13 02/01/2022    EGFR 9 01/31/2022    CREATININE 5 13 (H) 02/02/2022    CREATININE 4 33 (H) 02/01/2022    CREATININE 6 00 (H) 01/31/2022     Patient has history of CKD stage IV  Initially came in with [place] on CKD now hemodialysis dependent  Baseline creatinine around 4 3-4 7  Admission creatinine was 5 79  Patient also noted to have significant anasarca with minimally elevated potassium at 5 1  · Etiology suspected to be acute kidney injury related to possible cardiorenal syndrome, use of NSAIDs or progression of CKD  · Patient's previous SPEP/UPEP were negative and chronic hepatitis panel was negative  C3-C4 not low and NURIA was negative  · GBM antibodies and ANCA panel negative  · CT abdomen/pelvis 1/23 showed no hydronephrosis or urinary tract calculus  · UA showed trace amount of blood and protein  · Per nephrology, discontinued Actos as it can cause fluid retention  · Kidney biopsy was planned for 1/31 but after discussion between pt and Nephrology, it was canceled  · Tunneled dialysis catheter placed 1/27; Site monitored for drainage/oozing  · Sodium bicarb tablets and IV albumin 25g Q12h discontinued 1/28  · Lasix was increased to 80mg IV Q8h; discontinued 2/1 per Nephrology  No indication for p o  Diuretic on discharge for Nephrology  · Patient received hemodialysis 1/28, 1/31, and 2/2  · Cr improving;  Cr peaked 1/27 at 8 19 --> 5 13 today  · Patient to continue dialysis as outpatient on [id number] regimen per Nephrology   · COVID screening negative 2/1 for discharge to rehab facility

## 2022-01-25 NOTE — ASSESSMENT & PLAN NOTE
Lab Results   Component Value Date    HGBA1C 6 6 (H) 01/24/2022       Recent Labs     02/01/22  1100 02/01/22  1617 02/01/22 2048 02/02/22  0659   POCGLU 162* 151* 162* 113     Held home linagliptin and Actos during hospitalization    Restart linagliptin on discharge  Blood sugars remained stable with Humalog sliding scale with Accu-Cheks q a c  and HS

## 2022-01-25 NOTE — ASSESSMENT & PLAN NOTE
Results from last 7 days   Lab Units 02/01/22  1120 01/31/22  0534 01/29/22  0539 01/27/22  0455   HEMOGLOBIN g/dL 7 9* 7 5* 7 8* 8 0*   HEMATOCRIT % 25 0* 23 9* 24 4* 24 7*   MCV fL  --  94 92 93     Patient's hemoglobin dropped from 10-7 8  MCV normal   Baseline hemoglobin appears to be 8-8 5  Admission hemoglobin level of 10 questionable for accuracy  · No evidence of active bleeding  · Iron panel showed iron level of 34, TIBC of 163 and ferritin level of 61 suggesting iron deficiency  · Avoided SURINDER during hospitalization due to his history of TIA, but may be required after discharge per Nephrology  · Hemoglobin has remained stable in the 7s during hospital stay    · Repeat lab work after discharge at rehab facility

## 2022-01-26 LAB
ANION GAP SERPL CALCULATED.3IONS-SCNC: 16 MMOL/L (ref 4–13)
BUN SERPL-MCNC: 85 MG/DL (ref 5–25)
CALCIUM SERPL-MCNC: 7.8 MG/DL (ref 8.3–10.1)
CHLORIDE SERPL-SCNC: 103 MMOL/L (ref 100–108)
CO2 SERPL-SCNC: 19 MMOL/L (ref 21–32)
CREAT SERPL-MCNC: 7.92 MG/DL (ref 0.6–1.3)
ERYTHROCYTE [DISTWIDTH] IN BLOOD BY AUTOMATED COUNT: 14.7 % (ref 11.6–15.1)
GBM AB SER IA-ACNC: 3 UNITS (ref 0–20)
GFR SERPL CREATININE-BSD FRML MDRD: 6 ML/MIN/1.73SQ M
GLUCOSE SERPL-MCNC: 118 MG/DL (ref 65–140)
GLUCOSE SERPL-MCNC: 119 MG/DL (ref 65–140)
GLUCOSE SERPL-MCNC: 124 MG/DL (ref 65–140)
GLUCOSE SERPL-MCNC: 132 MG/DL (ref 65–140)
GLUCOSE SERPL-MCNC: 138 MG/DL (ref 65–140)
HCT VFR BLD AUTO: 25.2 % (ref 36.5–49.3)
HGB BLD-MCNC: 7.9 G/DL (ref 12–17)
MAGNESIUM SERPL-MCNC: 1.9 MG/DL (ref 1.6–2.6)
MCH RBC QN AUTO: 29 PG (ref 26.8–34.3)
MCHC RBC AUTO-ENTMCNC: 31.3 G/DL (ref 31.4–37.4)
MCV RBC AUTO: 93 FL (ref 82–98)
PHOSPHATE SERPL-MCNC: 7.4 MG/DL (ref 2.3–4.1)
PLATELET # BLD AUTO: 297 THOUSANDS/UL (ref 149–390)
PMV BLD AUTO: 9.9 FL (ref 8.9–12.7)
POTASSIUM SERPL-SCNC: 4.5 MMOL/L (ref 3.5–5.3)
RBC # BLD AUTO: 2.72 MILLION/UL (ref 3.88–5.62)
SODIUM SERPL-SCNC: 138 MMOL/L (ref 136–145)
WBC # BLD AUTO: 7.85 THOUSAND/UL (ref 4.31–10.16)

## 2022-01-26 PROCEDURE — 83735 ASSAY OF MAGNESIUM: CPT | Performed by: INTERNAL MEDICINE

## 2022-01-26 PROCEDURE — 82948 REAGENT STRIP/BLOOD GLUCOSE: CPT

## 2022-01-26 PROCEDURE — 85027 COMPLETE CBC AUTOMATED: CPT | Performed by: INTERNAL MEDICINE

## 2022-01-26 PROCEDURE — 99232 SBSQ HOSP IP/OBS MODERATE 35: CPT | Performed by: FAMILY MEDICINE

## 2022-01-26 PROCEDURE — 99232 SBSQ HOSP IP/OBS MODERATE 35: CPT | Performed by: INTERNAL MEDICINE

## 2022-01-26 PROCEDURE — 84100 ASSAY OF PHOSPHORUS: CPT | Performed by: INTERNAL MEDICINE

## 2022-01-26 PROCEDURE — 80048 BASIC METABOLIC PNL TOTAL CA: CPT | Performed by: INTERNAL MEDICINE

## 2022-01-26 PROCEDURE — 97530 THERAPEUTIC ACTIVITIES: CPT

## 2022-01-26 RX ORDER — HEPARIN SODIUM 5000 [USP'U]/ML
5000 INJECTION, SOLUTION INTRAVENOUS; SUBCUTANEOUS EVERY 8 HOURS SCHEDULED
Status: COMPLETED | OUTPATIENT
Start: 2022-01-26 | End: 2022-01-26

## 2022-01-26 RX ADMIN — CARVEDILOL 12.5 MG: 12.5 TABLET, FILM COATED ORAL at 08:29

## 2022-01-26 RX ADMIN — FUROSEMIDE 80 MG: 10 INJECTION, SOLUTION INTRAMUSCULAR; INTRAVENOUS at 17:16

## 2022-01-26 RX ADMIN — ALLOPURINOL 100 MG: 100 TABLET ORAL at 08:29

## 2022-01-26 RX ADMIN — OXYCODONE HYDROCHLORIDE 5 MG: 5 TABLET ORAL at 09:17

## 2022-01-26 RX ADMIN — CARVEDILOL 12.5 MG: 12.5 TABLET, FILM COATED ORAL at 17:30

## 2022-01-26 RX ADMIN — HEPARIN SODIUM 5000 UNITS: 5000 INJECTION INTRAVENOUS; SUBCUTANEOUS at 14:14

## 2022-01-26 RX ADMIN — HYDROMORPHONE HYDROCHLORIDE 0.5 MG: 1 INJECTION, SOLUTION INTRAMUSCULAR; INTRAVENOUS; SUBCUTANEOUS at 01:32

## 2022-01-26 RX ADMIN — HEPARIN SODIUM 5000 UNITS: 5000 INJECTION INTRAVENOUS; SUBCUTANEOUS at 21:51

## 2022-01-26 RX ADMIN — SODIUM BICARBONATE 650 MG TABLET 1300 MG: at 16:40

## 2022-01-26 RX ADMIN — ACETAMINOPHEN 650 MG: 325 TABLET, FILM COATED ORAL at 11:41

## 2022-01-26 RX ADMIN — ACETAMINOPHEN 650 MG: 325 TABLET, FILM COATED ORAL at 17:30

## 2022-01-26 RX ADMIN — FUROSEMIDE 80 MG: 10 INJECTION, SOLUTION INTRAMUSCULAR; INTRAVENOUS at 08:30

## 2022-01-26 RX ADMIN — ALBUMIN (HUMAN) 25 G: 0.25 INJECTION, SOLUTION INTRAVENOUS at 16:40

## 2022-01-26 RX ADMIN — ACETAMINOPHEN 650 MG: 325 TABLET, FILM COATED ORAL at 23:53

## 2022-01-26 RX ADMIN — NIFEDIPINE 30 MG: 30 TABLET, FILM COATED, EXTENDED RELEASE ORAL at 08:29

## 2022-01-26 RX ADMIN — ACETAMINOPHEN 650 MG: 325 TABLET, FILM COATED ORAL at 00:06

## 2022-01-26 RX ADMIN — ACETAMINOPHEN 650 MG: 325 TABLET, FILM COATED ORAL at 05:26

## 2022-01-26 RX ADMIN — ALBUMIN (HUMAN) 25 G: 0.25 INJECTION, SOLUTION INTRAVENOUS at 08:29

## 2022-01-26 RX ADMIN — OXYCODONE HYDROCHLORIDE 5 MG: 5 TABLET ORAL at 05:28

## 2022-01-26 RX ADMIN — NIFEDIPINE 30 MG: 30 TABLET, FILM COATED, EXTENDED RELEASE ORAL at 17:30

## 2022-01-26 RX ADMIN — OXYCODONE HYDROCHLORIDE 5 MG: 5 TABLET ORAL at 17:29

## 2022-01-26 RX ADMIN — SEVELAMER HYDROCHLORIDE 800 MG: 800 TABLET, FILM COATED PARENTERAL at 16:40

## 2022-01-26 RX ADMIN — SODIUM BICARBONATE 650 MG TABLET 1300 MG: at 12:41

## 2022-01-26 RX ADMIN — SEVELAMER HYDROCHLORIDE 800 MG: 800 TABLET, FILM COATED PARENTERAL at 08:29

## 2022-01-26 RX ADMIN — ATORVASTATIN CALCIUM 80 MG: 80 TABLET, FILM COATED ORAL at 08:30

## 2022-01-26 RX ADMIN — SODIUM BICARBONATE 650 MG TABLET 1300 MG: at 08:29

## 2022-01-26 RX ADMIN — OXYCODONE HYDROCHLORIDE 5 MG: 5 TABLET ORAL at 23:55

## 2022-01-26 RX ADMIN — LIDOCAINE 5% 1 PATCH: 700 PATCH TOPICAL at 08:29

## 2022-01-26 RX ADMIN — SEVELAMER HYDROCHLORIDE 800 MG: 800 TABLET, FILM COATED PARENTERAL at 11:40

## 2022-01-26 NOTE — PROGRESS NOTES
[facility] Medicine Progress Note  Patient: Tyrone Roldan 57 y o  male   MRN: [id number]  PCP: Julius Teague MD  Unit/Bed#: [address] [place] [address] Encounter: [id number]  Date Of Visit: 01/26/22    Problem List:    Principal Problem:    Acute kidney injury superimposed on chronic kidney disease ([facility] [address] )  Active Problems:    T10 vertebral fracture ([facility] [address] )    Diabetes mellitus ([facility] )    Hypertension    TIA (transient ischemic attack)    Anemia      Assessment & Plan:    * Acute kidney injury superimposed on chronic kidney disease [facility])  Assessment & Plan  Lab Results   Component Value Date    EGFR 6 01/26/2022    EGFR 7 01/25/2022    EGFR 7 01/24/2022    CREATININE 7 92 (H) 01/26/2022    CREATININE 7 36 (H) 01/25/2022    CREATININE 6 93 (H) 01/24/2022     Patient has history of CKD stage IV  Baseline creatinine around 4 3-4 7  Admission creatinine was 5 79  Patient also noted to have significant anasarca with minimally elevated potassium at 5 1  Etiology suspected to be acute kidney injury related to possible cardiorenal syndrome, use of NSAIDs or progression of CKD  Patient's previous SPEP/UPEP were negative and chronic hepatitis panel was negative  C3-C4 not low and NURIA was negative  GBM antibodies negative  ANCA panel ordered by Nephrology; pending  CT abdomen/pelvis 1/23 showed no hydronephrosis or urinary tract calculus  UA showed trace amount of blood and protein  Per nephrology, discontinued Actos as it can cause fluid retention  Avoid nephrotoxic agents and hypotension  After consult with IR and Nephrology, tentatively scheduled for kidney biopsy 1/31; continue to hold Plavix; hold heparin 6 hours prior to procedure  Nephrology suggesting DDAVP 20 micrograms IV x1 30 minutes prior to biopsy  Nephrology planning for dialysis catheter tomorrow; NPO after midnight and hold heparin tomorrow morning  Creatinine has increased to 7 92 today  Lasix was decreased to 80mg IV BID yesterday   Continue IV albumin 25g Q12h    T10 vertebral fracture [facility])  Assessment & Plan  Patient reported severe low back pain x1 week prior to admission after slipping on a rug  Denies any bowel or bladder incontinence, tingling or numbness in the legs  Patient received fentanyl and 2 dose of Dilaudid in the ED with persistent pain  Lumbar x-ray showed no fractures or malalignment with possible ankylosing spondylitis  CT abdomen/pelvis 1/23 showed minimally displaced oblique fracture of the T10 vertebral body without extending into the posterior elements without any real bony retropulsion  Thoracic spine x-ray 1/24 does not visualize fracture well  Dr Darian Rodriguez discussed with Neurosurgery Dr Luis Miguel Carrera - recommending TSLO brace and outpatient follow-up with neurosurgery  Continue acetaminophen 650 mg Q6h and Lidoderm 5% patch daily for pain management - d/w patient   Continue oxycodone 5mg along with 2 mg Q6h PRN and Dilaudid 0 5mg Q4h PRN breakthrough pain - d/w patient and will adjust meds based on response    Diabetes mellitus [facility])  Assessment & Plan  Lab Results   Component Value Date    HGBA1C 6 6 (H) 01/24/2022       Recent Labs     01/25/22  2058 01/26/22  0700 01/26/22  1037 01/26/22  1609   POCGLU 149* 132 118 124     Holding home linagliptin and Actos  Continue Humalog sliding scale with Accu-Cheks q a c  and HS  Blood sugars stable  Anemia  Assessment & Plan  Results from last 7 days   Lab Units 01/26/22  0532 01/25/22  0548 01/24/22  0530 01/23/22  0504 01/22/22  0804 01/21/22  1218   HEMOGLOBIN g/dL 7 9* 7 4* 7 5* 7 8* 8 7* 10 0*   HEMATOCRIT % 25 2* 23 5* 24 0* 25 7* 28 0* 32 2*   MCV fL 93 93 94 96 94 93     Patient's hemoglobin dropped from 10-7 8  MCV normal   Baseline hemoglobin appears to be 8-8 5  Admission hemoglobin level of 10 questionable for accuracy    No evidence of active bleeding  Iron panel showed iron level of 34, TIBC of 163 and ferritin level of 61 suggesting iron deficiency-pending stool occult blood  Transfuse PRBC if hemoglobin drops < 7  Given his history of TIA to avoid SURINDER for now    TIA (transient ischemic attack)  Assessment & Plan  Continue home statin  Hold Plavix in preparation for kidney biopsy   Hypertension  Assessment & Plan  Continue home nifedipine 30 mg PO BID and Coreg 12 5 mg PO BID with holding parameters    Diarrhea-resolved as of 2022  Assessment & Plan  Patient complained of some diarrhea after admission  Diarrhea has since resolved  Stool for bacterial panel and C diff toxin were negative  CT scan of the abdomen pelvis showed no acute pathology  Abdominal exam continues to be benign             VTE Pharmacologic Prophylaxis:   High Risk (Score >/= 5) - Pharmacological DVT Prophylaxis Ordered: heparin  Sequential Compression Devices Ordered  Patient Centered Rounds: I performed bedside rounds with nursing staff today  Discussions with Specialists or Other Care Team Provider: yes - renal    Education and Discussions with Family / Patient: yes    Time Spent for Care: 35 min  More than 50% of total time spent on counseling and coordination of care as described above  Current Length of Stay: 5 day(s)  Current Patient Status: Inpatient   Certification Statement: The patient will continue to require additional inpatient hospital stay due to acute kidney injury on CKD  Discharge Plan: Anticipate discharge in >72 hrs to discharge location to be determined pending rehab evaluations  Code Status: Level 1 - Full Code    Subjective:   Patient reports improvement in pain today with current regimen  Denies new complaints  He reports that lower extremity edema appears to be continually improving  Objective:     Vitals:   Temp (24hrs), Av 1 °F (36 7 °C), Min:98 °F (36 7 °C), Max:98 1 °F (36 7 °C)    Temp:  [98 °F (36 7 °C)-98 1 °F (36 7 °C)] 98 °F (36 7 °C)  HR:  [63-73] 66  Resp:  [18] 18  BP: (135-149)/(46-69) 135/46  SpO2:  [93 %-97 %] 95 %  Body mass index is 37 81 kg/m²  Input and Output Summary (last 24 hours): Intake/Output Summary (Last 24 hours) at 1/26/2022 1703  Last data filed at 1/26/2022 0501  Gross per 24 hour   Intake 90 ml   Output 1100 ml   Net -1010 ml       Physical Exam:   Physical Exam  Constitutional:       General: He is not in acute distress  Appearance: He is obese  HENT:      Head: Normocephalic and atraumatic  Eyes:      General:         Right eye: No discharge  Left eye: No discharge  Cardiovascular:      Rate and Rhythm: Normal rate and regular rhythm  Pulmonary:      Effort: Pulmonary effort is normal  No respiratory distress  Breath sounds: Normal breath sounds  No wheezing or rales  Abdominal:      General: Bowel sounds are normal  There is no distension  Palpations: Abdomen is soft  Tenderness: There is no abdominal tenderness  Musculoskeletal:      Right lower leg: Edema present  Left lower leg: Edema present  Skin:     General: Skin is dry  Neurological:      Mental Status: He is alert and oriented to person, place, and time  Additional Data:     Labs:  Results from last 7 days   Lab Units 01/26/22  0532 01/24/22  0530 01/23/22  0504   WBC Thousand/uL 7 85   < > 5 17   HEMOGLOBIN g/dL 7 9*   < > 7 8*   HEMATOCRIT % 25 2*   < > 25 7*   PLATELETS Thousands/uL 297   < > 289   NEUTROS PCT %  --   --  57   LYMPHS PCT %  --   --  22   MONOS PCT %  --   --  16*   EOS PCT %  --   --  3    < > = values in this interval not displayed       Results from last 7 days   Lab Units 01/26/22  0532 01/22/22  0804 01/21/22  1218   SODIUM mmol/L 138   < > 140   POTASSIUM mmol/L 4 5   < > 5 1   CHLORIDE mmol/L 103   < > 108   CO2 mmol/L 19*   < > 19*   BUN mg/dL 85*   < > 63*   CREATININE mg/dL 7 92*   < > 5 79*   ANION GAP mmol/L 16*   < > 13   CALCIUM mg/dL 7 8*   < > 7 9*   ALBUMIN g/dL  --   --  2 3*   TOTAL BILIRUBIN mg/dL  --   --  0 19*   ALK PHOS U/L  --   --  104   ALT U/L  --   --  32   AST U/L  -- --  37   GLUCOSE RANDOM mg/dL 119   < > 149*    < > = values in this interval not displayed  Results from last 7 days   Lab Units 01/25/22  0548   INR  1 02     Results from last 7 days   Lab Units 01/26/22  1609 01/26/22  1037 01/26/22  0700 01/25/22  2058 01/25/22  1612 01/25/22  1133 01/25/22  0718 01/24/22  2119 01/24/22  1608 01/24/22  1114 01/24/22  0804 01/23/22  2158   POC GLUCOSE mg/dl 124 118 132 149* 143* 137 103 119 142* 129 111 119     Results from last 7 days   Lab Units 01/24/22  0530   HEMOGLOBIN A1C % 6 6*           Lines/Drains:  Invasive Devices  Report    Peripheral Intravenous Line            Peripheral IV 01/24/22 Dorsal (posterior); Right Hand 2 days                      Imaging: No pertinent imaging reviewed      Recent Cultures (last 7 days):   Results from last 7 days   Lab Units 01/22/22  1831   C DIFF TOXIN B BY PCR  Negative       Last 24 Hours Medication List:   Current Facility-Administered Medications   Medication Dose Route Frequency Provider Last Rate    acetaminophen  650 mg Oral Q6H [address] Lore Revankar, DO      albumin human  25 g Intravenous BID (diuretic) Tiffanie Jolley MD      allopurinol  100 mg Oral Daily Tania Patterson MD      atorvastatin  80 mg Oral Daily Tania Patterson MD      carvedilol  12 5 mg Oral BID Tania Patterson MD      furosemide  80 mg Intravenous BID (diuretic) Tiffanie Jolley MD      heparin (porcine)  5,000 Units Subcutaneous Q8H [address] Lore Revankar, DO      HYDROmorphone  0 5 mg Intravenous Q4H PRN Lore Revankar, DO      HYDROmorphone  1 mg Intravenous Once Tania Patterson MD      insulin lispro  1-5 Units Subcutaneous HS Tania Patterson MD      insulin lispro  2-12 Units Subcutaneous TID AC Tania Patterson MD      lidocaine  1 patch Topical Daily Tania Patterson MD      NIFEdipine  30 mg Oral BID Felton Vital MD      ondansetron  4 mg Intravenous Q6H PRN Tania Patterson MD      oxyCODONE  2 5 mg Oral Q6H PRN Inge Torres MD      oxyCODONE  5 mg Oral Q6H PRN Lore Silver DO      sevelamer  800 mg Oral TID With Meals Michel Perales MD      sodium bicarbonate  1,300 mg Oral TID after meals Elaina Lopez MD          Today, Patient Was Seen By: Maritza Hoyos DO    ** Please Note: "This note has been constructed using a voice recognition system  Therefore there may be syntax, spelling, and/or grammatical errors   Please call if you have any questions  "**

## 2022-01-26 NOTE — PHYSICAL THERAPY NOTE
PT TREATMENT     01/26/22 1415   Note Type   Note Type Treatment   Pain Assessment   Pain Assessment Tool 0-10   Pain Score 5   Pain Location/Orientation Location: Back   Restrictions/Precautions   Braces or Orthoses TLSO   Other Precautions Pain; Fall Risk   General   Chart Reviewed Yes   Additional Pertinent History arrived to pt's room, pt sitting on the edge of the bed not wearing his TLSO   Cognition   Overall Cognitive Status WFL   Subjective   Subjective "I don't feel any better"   Transfers   Sit to Stand 5  Supervision   Additional items Verbal cues; Increased time required  (raised bed)   Stand to Sit 5  Supervision   Stand pivot 5  Supervision   Additional items   (with walker)   Ambulation/Elevation   Gait pattern   (flexed posture, shuffling gait)   Gait Assistance 4  Minimal assist   Assistive Device Rolling walker  (3x60 feet)   Balance   Static Sitting Good   Dynamic Sitting Fair +   Static Standing Fair   Dynamic Standing Fair   Ambulatory Fair -   Activity Tolerance   Activity Tolerance Patient tolerated treatment well;Patient limited by fatigue   Assessment   Prognosis Good   Problem List Decreased strength;Decreased endurance; Impaired balance;Decreased mobility; Decreased range of motion;Pain;Orthopedic restrictions   Assessment Pt demonstrates improving quality of movement however still fatigues quickly and needs the walker to ambulate due to pain, weakness, and imbalance  Pt has not been fully compliant with wearing the TLSO  Reitterated the benefits of wearing the brace consistently  The patient's AM-PAC Basic Mobility Inpatient Short Form Raw Score is 17  A Raw score of greater than 16 suggests the patient may benefit from discharge to home, however pt lives alone and requires assist for mobility and to don/doff the TLSO  Recommend STR so pt can work towards independent function          Plan   Treatment/Interventions Functional transfer training;ADL retraining;LE strengthening/ROM; Elevations; Therapeutic exercise;Gait training; Compensatory technique education; Bed mobility; Equipment eval/education;Patient/family training; Endurance training   Progress Progressing toward goals   PT Frequency Other (Comment)  (5w)   Recommendation   PT Discharge Recommendation Post acute rehabilitation services   AM-PAC Basic Mobility Inpatient   Turning in Bed Without Bedrails 3   Lying on Back to Sitting on Edge of Flat Bed 3   Moving Bed to Chair 3   Standing Up From Chair 3   Walk in Room 3   Climb 3-5 Stairs 2   Basic Mobility Inpatient Raw Score 17   Basic Mobility Standardized Score 39 67   Highest Level Of Mobility   JH-HLM Goal 5: Stand one or more mins   JH-HLM Highest Level of Mobility 7: Walk 25 feet or more   JH-HLM Goal Achieved Yes   Education   Education Provided Home exercise program  (pt encouraged to perform sit to stands at the bed side for LE strengthening  Also discussed walking in the james with the nursing staff to improve endurance and strength    Pt needs encouragement to take charge of his own health/wellbeing)   Patient Reinforcement needed   End of Consult   Patient Position at End of Consult Seated edge of bed   Licensure   [place] License Number  [place] PT [id number]

## 2022-01-26 NOTE — PLAN OF CARE
Problem: MOBILITY - ADULT  Goal: Maintain or return to baseline ADL function  Description: INTERVENTIONS:  -  Assess patient's ability to carry out ADLs; assess patient's baseline for ADL function and identify physical deficits which impact ability to perform ADLs (bathing, care of mouth/teeth, toileting, grooming, dressing, etc )  - Assess/evaluate cause of self-care deficits   - Assess range of motion  - Assess patient's mobility; develop plan if impaired  - Assess patient's need for assistive devices and provide as appropriate  - Encourage maximum independence but intervene and supervise when necessary  - Involve family in performance of ADLs  - Assess for home care needs following discharge   - Consider OT consult to assist with ADL evaluation and planning for discharge  - Provide patient education as appropriate  Outcome: Progressing     Problem: Potential for Falls  Goal: Patient will remain free of falls  Description: INTERVENTIONS:  - Educate patient/family on patient safety including physical limitations  - Instruct patient to call for assistance with activity   - Consult OT/PT to assist with strengthening/mobility   - Keep Call bell within reach  - Keep bed low and locked with side rails adjusted as appropriate  - Keep care items and personal belongings within reach  - Initiate and maintain comfort rounds  - Make Fall Risk Sign visible to staff  - Offer Toileting every 2 Hours, in advance of need  - Initiate/Maintain bed/ chair alarm  - Apply yellow socks and bracelet for high fall risk patients  - Consider moving patient to room near nurses station  Outcome: Progressing

## 2022-01-26 NOTE — PROGRESS NOTES
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, TIA, diastolic CHF, who initially presents end of prior week with back pain   After a fall   Nephrology is on board for acute kidney injury   Patient was also initially noted to be hypertensive     1) DEVIN on CKD IV-V     - baseline creatinine 4 3-4 7 mg/dL (but to note, creatinine in February 2020 was 1 9, rising to 3 3 in July 2021, and rising further to 4 5-4 7 in November 2021)  -during prior admission in November, creatinine stabilized to 4 7 at [facility] and chlorthalidone were held  Musa Watkins was planned but could not be completed due to body habitus   -admission creatinine 5 8 mg/dL  -patient was initially started on furosemide IV with albumin  -urinalysis with 0-1 RBC, 0-1 WBC, 2+ protein  -urine protein creatinine ratio 10 7 g  -CT scan-kidneys without hydronephrosis, renal hypodensities too small to characterize likely benign   Minimally displaced oblique fracture involving T10   -creatinine rising 6 4 mg/dL on January 23rd  -SPEP, UPEP-unrevealing prior  -chronic hepatitis panel-negative prior  -C3, C4 was not low prior  -NURIA negative  -ANCA-  -anti phospholipase A2 antibody receptor-  -anti GBM-  -A1c is 6 6%     Etiology of acute kidney injury is unclear   May need to consider membranous versus minimal change versus other   Does not appear nephritic   A1c is well controlled   To note, patient was using NSAIDs 3 tablets 3 to 4 times a day for 1 week prior to admission   Therefore there may be a component of NSAID induced nephropathy also     1/24-creatinine continues to rise to 6 9 mg/dL   Patient has nephrotic range proteinuria   No hematuria   No hydronephrosis    Was on furosemide 3 times a day     -1/25-creatinine continues to rise is 7 4 mg/dL  Lower furosemide slightly    -1/26-creatinine rising 7 9 mg/dL    Continue furosemide twice a day and albumin twice a day      Plan    -okay to restart heparin subQ for today and then hold for tomorrow  -restart diet today  -plan for NPO after midnight tonight  -plan for dialysis catheter tomorrow if the creatinine is still rising-patient agreeable  -then will plan to initiate dialysis either tomorrow or Friday  -patient prefers 1 more day to monitor with IV diuretics and renal function before placing dialysis catheter  -education provided regarding inpatient and outpatient dialysis   -hepatitis panel ordered  -I/O; avoid nephrotoxic agents  -avoid hypotension  -follow-up Anca, anti GBM antibodies,  -pending MEGHANA 2R  -continue to [facility] Plavix due to potential biopsy later this week or early next week  -agree with continuing to hold Actos  -check stool occult  -  radiology team is on board for biopsy potentially next Monday  -biopsy form sent 1/25 to IR  -patient is agreeable to be NPO overnight in case needs dialysis catheter tomorrow (to know last dose of Plavix was 1/24)  -may need blood transfusion prior to biopsy  -avoid all NSAIDs as doing  - will need DDAVP prior to biopsy 30 minutes before -20 mcg IV 1 time  - will need to be NPO Rolando night into Monday  - AM heparin sq will need to be held Monday AM for biopsy (1/31)     2) back pain     -patient had fall at home 1 week prior to admission  -per primary team  -pain control per primary team     3) hypertension-     -on nifedipine and carvedilol     4) acid/base-     -bicarbonate lower at 16 and started on bicarbonate tablets and serum bicarbonate 17 on 01/24  -on bicarbonate tablets-continue     5) anemia-     -hemoglobin low at 7 4  -iron panel-iron saturation 20%  -received Venofer prior admission in November  -history of TIA, will avoid SURINDER for now  -decreasing hemoglobin is likely a factor of CKD but initial hemoglobin was 10 on 01/21 and now has decreased to 7 5   Unclear accuracy of 10  -transfusion per primary team  -iron panel with iron saturation 21%     6) T10 vertebral fracture-per primary team     7) electrolytes-sodium is stable, potassium is stable    SUBJECTIVE / 24H INTERVAL HISTORY:    Blood pressure stable  Afebrile  On room air  Still with edema  Back pain  No shortness of breath      OBJECTIVE:  Current Weight: Weight - Scale: 130 kg (286 lb 9 6 oz)  Vitals:    01/25/22 2030 01/25/22 2100 01/25/22 2238 01/26/22 0729   BP: 148/68  149/69 137/62   BP Location: Left arm  Left arm Left arm   Pulse: 73  70 63   Resp: 18  18 18   Temp: 98 1 °F (36 7 °C)  98 1 °F (36 7 °C) 98 °F (36 7 °C)   TempSrc: Oral  Oral Oral   SpO2: 95% 95% 93% 97%   Weight:       Height:           Intake/Output Summary (Last 24 hours) at 1/26/2022 2015  Last data filed at 1/26/2022 0501  Gross per 24 hour   Intake 90 ml   Output 1725 ml   Net -1635 ml     General: NAD  Skin: no rash  Eyes: anicteric sclera  ENT: moist mucous membrane  Neck: supple  Chest: CTA b/l, no ronchii, no wheeze, no rubs, no rales  CVS: s1s2, no murmur, no gallop, no rub  Abdomen: soft, nontender, nl sounds  Extremities:  Edema to proximal thigh 2+  : no gaitan  Neuro: AAOX3  Psych: normal affect    Medications:    Current Facility-Administered Medications:     acetaminophen (TYLENOL) tablet 650 mg, 650 mg, Oral, Q6H [facility], Lore Silver DO, 650 mg at 01/26/22 0526    albumin human (FLEXBUMIN) 25 % injection 25 g, 25 g, Intravenous, BID (diuretic), Brian Castro MD, 25 g at 01/26/22 0829    allopurinol (ZYLOPRIM) tablet 100 mg, 100 mg, Oral, Daily, Joel Dalton MD, 100 mg at 01/26/22 0829    atorvastatin (LIPITOR) tablet 80 mg, 80 mg, Oral, Daily, Joel Dalton MD, 80 mg at 01/26/22 0830    carvedilol (COREG) tablet 12 5 mg, 12 5 mg, Oral, BID, Joel Dalton MD, 12 5 mg at 01/26/22 0869   furosemide (LASIX) injection 80 mg, 80 mg, Intravenous, BID (diuretic), Brian Castro MD, 80 mg at 01/26/22 0830    HYDROmorphone (DILAUDID) injection 0 5 mg, 0 5 mg, Intravenous, Q4H PRN, Lore Revankar, DO, 0 5 mg at 01/26/22 0132    HYDROmorphone (DILAUDID) injection 1 mg, 1 mg, Intravenous, Once, Abel Hdz MD    insulin lispro (HumaLOG) 100 units/mL subcutaneous injection 1-5 Units, 1-5 Units, Subcutaneous, HS, Joel Dalton MD    insulin lispro (HumaLOG) 100 units/mL subcutaneous injection 2-12 Units, 2-12 Units, Subcutaneous, TID AC, 2 Units at 01/21/22 1744 **AND** Fingerstick Glucose (POCT), , , TID AC, Gregorio Dalton MD    lidocaine (LIDODERM) 5 % patch 1 patch, 1 patch, Topical, Daily, Abel Hdz MD, 1 patch at 01/26/22 0829    NIFEdipine (PROCARDIA XL) 24 hr tablet 30 mg, 30 mg, Oral, BID, Ashley Rodriguez MD, 30 mg at 01/26/22 0829    ondansetron (ZOFRAN) injection 4 mg, 4 mg, Intravenous, Q6H PRN, Abel Hdz MD    oxyCODONE (ROXICODONE) IR tablet 2 5 mg, 2 5 mg, Oral, Q6H PRN, Abel Hdz MD    oxyCODONE (ROXICODONE) IR tablet 5 mg, 5 mg, Oral, Q6H PRN, Lore Silver DO, 5 mg at 01/26/22 0528    sevelamer (RENAGEL) tablet 800 mg, 800 mg, Oral, TID With Meals, Brian Castro MD, 800 mg at 01/26/22 0829    sodium bicarbonate tablet 1,300 mg, 1,300 mg, Oral, TID after meals, Ashley Rodriguez MD, 1,300 mg at 01/26/22 1815    Laboratory Results:  Results from last 7 days   Lab Units 01/26/22  0532 01/25/22  0548 01/24/22  0530 01/23/22  0504 01/22/22  0804 01/21/22  1218   WBC Thousand/uL 7 85 7 12 6 67 5 17 6 53 8 38   HEMOGLOBIN g/dL 7 9* 7 4* 7 5* 7 8* 8 7* 10 0*   HEMATOCRIT % 25 2* 23 5* 24 0* 25 7* 28 0* 32 2*   PLATELETS Thousands/uL 297 272 284 289 302 347   POTASSIUM mmol/L 4 5 4 7 4 6 4 6 4 5 5 1   CHLORIDE mmol/L 103 102 102 104 104 108   CO2 mmol/L 19* 16* 17* 16* 17* 19*   BUN mg/dL 85* 84* 76* 70* 70* 63*   CREATININE mg/dL 7 92* 7 36* 6 93* 6 41* 5 79* 5 79*   CALCIUM mg/dL 7 8* 7 5* 7 6* 7 7* 7 5* 7 9*   MAGNESIUM mg/dL 1 9 1 8  --   --   --  2 1   PHOSPHORUS mg/dL 7 4* 7 4*  --   --   --   --

## 2022-01-26 NOTE — PLAN OF CARE
Problem: METABOLIC, FLUID AND ELECTROLYTES - ADULT  Goal: Electrolytes maintained within normal limits  Description: INTERVENTIONS:  - Monitor labs and assess patient for signs and symptoms of electrolyte imbalances  - Administer electrolyte replacement as ordered  - Monitor response to electrolyte replacements, including repeat lab results as appropriate  - Instruct patient on fluid and nutrition as appropriate  Outcome: Progressing     Problem: Potential for Falls  Goal: Patient will remain free of falls  Description: INTERVENTIONS:  - Educate patient/family on patient safety including physical limitations  - Instruct patient to call for assistance with activity   - Consult OT/PT to assist with strengthening/mobility   - Keep Call bell within reach  - Keep bed low and locked with side rails adjusted as appropriate  - Keep care items and personal belongings within reach  - Initiate and maintain comfort rounds  - Make Fall Risk Sign visible to staff  - Offer Toileting every 2 Hours, in advance of need  - Initiate/Maintain bed/ chair alarm  - Apply yellow socks and bracelet for high fall risk patients  - Consider moving patient to room near nurses station  Outcome: Progressing     Problem: MOBILITY - ADULT  Goal: Maintain or return to baseline ADL function  Description: INTERVENTIONS:  -  Assess patient's ability to carry out ADLs; assess patient's baseline for ADL function and identify physical deficits which impact ability to perform ADLs (bathing, care of mouth/teeth, toileting, grooming, dressing, etc )  - Assess/evaluate cause of self-care deficits   - Assess range of motion  - Assess patient's mobility; develop plan if impaired  - Assess patient's need for assistive devices and provide as appropriate  - Encourage maximum independence but intervene and supervise when necessary  - Involve family in performance of ADLs  - Assess for home care needs following discharge   - Consider OT consult to assist with ADL evaluation and planning for discharge  - Provide patient education as appropriate  Outcome: Progressing

## 2022-01-26 NOTE — PLAN OF CARE
Problem: Prexisting or High Potential for Compromised Skin Integrity  Goal: Skin integrity is maintained or improved  Description: INTERVENTIONS:  - Identify patients at risk for skin breakdown  - Assess and monitor skin integrity  - Assess and monitor nutrition and hydration status  - Monitor labs   - Assess for incontinence   - Assist with mobility/ambulation  - Relieve pressure over bony prominences  - Avoid friction and shearing  - Provide appropriate hygiene as needed including keeping skin clean and dry  - Evaluate need for skin moisturizer/barrier cream  - Collaborate with interdisciplinary team   - Patient/family teaching  - Consider wound care consult   Outcome: Progressing     Problem: MOBILITY - ADULT  Goal: Maintain or return to baseline ADL function  Description: INTERVENTIONS:  -  Assess patient's ability to carry out ADLs; assess patient's baseline for ADL function and identify physical deficits which impact ability to perform ADLs (bathing, care of mouth/teeth, toileting, grooming, dressing, etc )  - Assess/evaluate cause of self-care deficits   - Assess range of motion  - Assess patient's mobility; develop plan if impaired  - Assess patient's need for assistive devices and provide as appropriate  - Encourage maximum independence but intervene and supervise when necessary  - Involve family in performance of ADLs  - Assess for home care needs following discharge   - Consider OT consult to assist with ADL evaluation and planning for discharge  - Provide patient education as appropriate  Outcome: Progressing     Problem: Potential for Falls  Goal: Patient will remain free of falls  Description: INTERVENTIONS:  - Educate patient/family on patient safety including physical limitations  - Instruct patient to call for assistance with activity   - Consult OT/PT to assist with strengthening/mobility   - Keep Call bell within reach  - Keep bed low and locked with side rails adjusted as appropriate  - Keep care items and personal belongings within reach  - Initiate and maintain comfort rounds  - Make Fall Risk Sign visible to staff  - Offer Toileting every 2 Hours, in advance of need  - Initiate/Maintain bed/ chair alarm  - Apply yellow socks and bracelet for high fall risk patients  - Consider moving patient to room near nurses station  Outcome: Progressing     Problem: METABOLIC, FLUID AND ELECTROLYTES - ADULT  Goal: Electrolytes maintained within normal limits  Description: INTERVENTIONS:  - Monitor labs and assess patient for signs and symptoms of electrolyte imbalances  - Administer electrolyte replacement as ordered  - Monitor response to electrolyte replacements, including repeat lab results as appropriate  - Instruct patient on fluid and nutrition as appropriate  Outcome: Progressing  Goal: Fluid balance maintained  Description: INTERVENTIONS:  - Monitor labs   - Monitor I/O and WT  - Instruct patient on fluid and nutrition as appropriate  - Assess for signs & symptoms of volume excess or deficit  Outcome: Progressing

## 2022-01-26 NOTE — PHYSICAL THERAPY NOTE
Attempted PT treatment however pt declined any/all activity due to c/o generalized fatigue and pain  Will follow    Fei Diaz PT  [id number]     01/26/22 1351   Note Type   Note Type Cancelled Session   Cancel Reasons Other

## 2022-01-27 ENCOUNTER — APPOINTMENT (INPATIENT)
Dept: NON INVASIVE DIAGNOSTICS | Facility: HOSPITAL | Age: 62
DRG: 674 | End: 2022-01-27
Attending: RADIOLOGY
Payer: MEDICARE

## 2022-01-27 LAB
ANION GAP SERPL CALCULATED.3IONS-SCNC: 16 MMOL/L (ref 4–13)
BUN SERPL-MCNC: 91 MG/DL (ref 5–25)
C-ANCA TITR SER IF: NORMAL TITER
CALCIUM SERPL-MCNC: 7.7 MG/DL (ref 8.3–10.1)
CHLORIDE SERPL-SCNC: 102 MMOL/L (ref 100–108)
CO2 SERPL-SCNC: 20 MMOL/L (ref 21–32)
CREAT SERPL-MCNC: 8.19 MG/DL (ref 0.6–1.3)
ERYTHROCYTE [DISTWIDTH] IN BLOOD BY AUTOMATED COUNT: 14.7 % (ref 11.6–15.1)
GFR SERPL CREATININE-BSD FRML MDRD: 6 ML/MIN/1.73SQ M
GLUCOSE SERPL-MCNC: 104 MG/DL (ref 65–140)
GLUCOSE SERPL-MCNC: 108 MG/DL (ref 65–140)
GLUCOSE SERPL-MCNC: 114 MG/DL (ref 65–140)
GLUCOSE SERPL-MCNC: 172 MG/DL (ref 65–140)
GLUCOSE SERPL-MCNC: 82 MG/DL (ref 65–140)
HBV CORE AB SER QL: NORMAL
HBV CORE IGM SER QL: NORMAL
HBV SURFACE AB SER-ACNC: <3.1 MIU/ML
HBV SURFACE AG SER QL: NORMAL
HCT VFR BLD AUTO: 24.7 % (ref 36.5–49.3)
HCV AB SER QL: NORMAL
HGB BLD-MCNC: 8 G/DL (ref 12–17)
MAGNESIUM SERPL-MCNC: 1.9 MG/DL (ref 1.6–2.6)
MCH RBC QN AUTO: 30 PG (ref 26.8–34.3)
MCHC RBC AUTO-ENTMCNC: 32.4 G/DL (ref 31.4–37.4)
MCV RBC AUTO: 93 FL (ref 82–98)
MYELOPEROXIDASE AB SER IA-ACNC: <9 U/ML (ref 0–9)
P-ANCA ATYPICAL TITR SER IF: NORMAL TITER
P-ANCA TITR SER IF: NORMAL TITER
PHOSPHATE SERPL-MCNC: 7.6 MG/DL (ref 2.3–4.1)
PLATELET # BLD AUTO: 307 THOUSANDS/UL (ref 149–390)
PMV BLD AUTO: 9.9 FL (ref 8.9–12.7)
POTASSIUM SERPL-SCNC: 4.3 MMOL/L (ref 3.5–5.3)
PROTEINASE3 AB SER IA-ACNC: <3.5 U/ML (ref 0–3.5)
RBC # BLD AUTO: 2.67 MILLION/UL (ref 3.88–5.62)
SODIUM SERPL-SCNC: 138 MMOL/L (ref 136–145)
WBC # BLD AUTO: 7.36 THOUSAND/UL (ref 4.31–10.16)

## 2022-01-27 PROCEDURE — 85027 COMPLETE CBC AUTOMATED: CPT | Performed by: INTERNAL MEDICINE

## 2022-01-27 PROCEDURE — 77001 FLUOROGUIDE FOR VEIN DEVICE: CPT

## 2022-01-27 PROCEDURE — 87340 HEPATITIS B SURFACE AG IA: CPT | Performed by: INTERNAL MEDICINE

## 2022-01-27 PROCEDURE — 83735 ASSAY OF MAGNESIUM: CPT | Performed by: INTERNAL MEDICINE

## 2022-01-27 PROCEDURE — 82948 REAGENT STRIP/BLOOD GLUCOSE: CPT

## 2022-01-27 PROCEDURE — 0JH63XZ INSERTION OF TUNNELED VASCULAR ACCESS DEVICE INTO CHEST SUBCUTANEOUS TISSUE AND FASCIA, PERCUTANEOUS APPROACH: ICD-10-PCS | Performed by: FAMILY MEDICINE

## 2022-01-27 PROCEDURE — 76937 US GUIDE VASCULAR ACCESS: CPT | Performed by: RADIOLOGY

## 2022-01-27 PROCEDURE — C1894 INTRO/SHEATH, NON-LASER: HCPCS

## 2022-01-27 PROCEDURE — 5A1D70Z PERFORMANCE OF URINARY FILTRATION, INTERMITTENT, LESS THAN 6 HOURS PER DAY: ICD-10-PCS | Performed by: FAMILY MEDICINE

## 2022-01-27 PROCEDURE — 86704 HEP B CORE ANTIBODY TOTAL: CPT | Performed by: INTERNAL MEDICINE

## 2022-01-27 PROCEDURE — 99152 MOD SED SAME PHYS/QHP 5/>YRS: CPT

## 2022-01-27 PROCEDURE — 86706 HEP B SURFACE ANTIBODY: CPT | Performed by: INTERNAL MEDICINE

## 2022-01-27 PROCEDURE — 84100 ASSAY OF PHOSPHORUS: CPT | Performed by: INTERNAL MEDICINE

## 2022-01-27 PROCEDURE — 99232 SBSQ HOSP IP/OBS MODERATE 35: CPT | Performed by: INTERNAL MEDICINE

## 2022-01-27 PROCEDURE — 86705 HEP B CORE ANTIBODY IGM: CPT | Performed by: INTERNAL MEDICINE

## 2022-01-27 PROCEDURE — 36558 INSERT TUNNELED CV CATH: CPT

## 2022-01-27 PROCEDURE — 99232 SBSQ HOSP IP/OBS MODERATE 35: CPT | Performed by: FAMILY MEDICINE

## 2022-01-27 PROCEDURE — NC001 PR NO CHARGE: Performed by: RADIOLOGY

## 2022-01-27 PROCEDURE — 76937 US GUIDE VASCULAR ACCESS: CPT

## 2022-01-27 PROCEDURE — 36558 INSERT TUNNELED CV CATH: CPT | Performed by: RADIOLOGY

## 2022-01-27 PROCEDURE — C1750 CATH, HEMODIALYSIS,LONG-TERM: HCPCS

## 2022-01-27 PROCEDURE — 80048 BASIC METABOLIC PNL TOTAL CA: CPT | Performed by: INTERNAL MEDICINE

## 2022-01-27 PROCEDURE — 77001 FLUOROGUIDE FOR VEIN DEVICE: CPT | Performed by: RADIOLOGY

## 2022-01-27 PROCEDURE — 86803 HEPATITIS C AB TEST: CPT | Performed by: INTERNAL MEDICINE

## 2022-01-27 PROCEDURE — 99152 MOD SED SAME PHYS/QHP 5/>YRS: CPT | Performed by: RADIOLOGY

## 2022-01-27 RX ORDER — FENTANYL CITRATE 50 UG/ML
INJECTION, SOLUTION INTRAMUSCULAR; INTRAVENOUS CODE/TRAUMA/SEDATION MEDICATION
Status: COMPLETED | OUTPATIENT
Start: 2022-01-27 | End: 2022-01-27

## 2022-01-27 RX ORDER — CEFAZOLIN SODIUM 2 G/50ML
SOLUTION INTRAVENOUS
Status: COMPLETED | OUTPATIENT
Start: 2022-01-27 | End: 2022-01-27

## 2022-01-27 RX ORDER — MIDAZOLAM HYDROCHLORIDE 2 MG/2ML
INJECTION, SOLUTION INTRAMUSCULAR; INTRAVENOUS CODE/TRAUMA/SEDATION MEDICATION
Status: COMPLETED | OUTPATIENT
Start: 2022-01-27 | End: 2022-01-27

## 2022-01-27 RX ADMIN — ALLOPURINOL 100 MG: 100 TABLET ORAL at 08:59

## 2022-01-27 RX ADMIN — ALBUMIN (HUMAN) 25 G: 0.25 INJECTION, SOLUTION INTRAVENOUS at 08:58

## 2022-01-27 RX ADMIN — NIFEDIPINE 30 MG: 30 TABLET, FILM COATED, EXTENDED RELEASE ORAL at 09:00

## 2022-01-27 RX ADMIN — ACETAMINOPHEN 650 MG: 325 TABLET, FILM COATED ORAL at 17:18

## 2022-01-27 RX ADMIN — HYDROMORPHONE HYDROCHLORIDE 0.5 MG: 1 INJECTION, SOLUTION INTRAMUSCULAR; INTRAVENOUS; SUBCUTANEOUS at 13:29

## 2022-01-27 RX ADMIN — SODIUM BICARBONATE 650 MG TABLET 1300 MG: at 13:29

## 2022-01-27 RX ADMIN — ACETAMINOPHEN 650 MG: 325 TABLET, FILM COATED ORAL at 23:52

## 2022-01-27 RX ADMIN — OXYCODONE HYDROCHLORIDE 5 MG: 5 TABLET ORAL at 08:58

## 2022-01-27 RX ADMIN — SEVELAMER HYDROCHLORIDE 800 MG: 800 TABLET, FILM COATED PARENTERAL at 07:30

## 2022-01-27 RX ADMIN — FUROSEMIDE 80 MG: 10 INJECTION, SOLUTION INTRAMUSCULAR; INTRAVENOUS at 08:58

## 2022-01-27 RX ADMIN — HYDROMORPHONE HYDROCHLORIDE 0.5 MG: 1 INJECTION, SOLUTION INTRAMUSCULAR; INTRAVENOUS; SUBCUTANEOUS at 01:28

## 2022-01-27 RX ADMIN — FENTANYL CITRATE 50 MCG: 50 INJECTION, SOLUTION INTRAMUSCULAR; INTRAVENOUS at 14:40

## 2022-01-27 RX ADMIN — INSULIN LISPRO 2 UNITS: 100 INJECTION, SOLUTION INTRAVENOUS; SUBCUTANEOUS at 17:23

## 2022-01-27 RX ADMIN — SODIUM BICARBONATE 650 MG TABLET 1300 MG: at 08:58

## 2022-01-27 RX ADMIN — CEFAZOLIN SODIUM 2000 MG: 2 SOLUTION INTRAVENOUS at 14:39

## 2022-01-27 RX ADMIN — ACETAMINOPHEN 650 MG: 325 TABLET, FILM COATED ORAL at 06:17

## 2022-01-27 RX ADMIN — ALBUMIN (HUMAN) 25 G: 0.25 INJECTION, SOLUTION INTRAVENOUS at 16:00

## 2022-01-27 RX ADMIN — MIDAZOLAM 2 MG: 1 INJECTION INTRAMUSCULAR; INTRAVENOUS at 14:40

## 2022-01-27 RX ADMIN — ACETAMINOPHEN 650 MG: 325 TABLET, FILM COATED ORAL at 12:00

## 2022-01-27 RX ADMIN — ATORVASTATIN CALCIUM 80 MG: 80 TABLET, FILM COATED ORAL at 08:58

## 2022-01-27 RX ADMIN — CARVEDILOL 12.5 MG: 12.5 TABLET, FILM COATED ORAL at 08:58

## 2022-01-27 RX ADMIN — FUROSEMIDE 80 MG: 10 INJECTION, SOLUTION INTRAMUSCULAR; INTRAVENOUS at 16:30

## 2022-01-27 RX ADMIN — FENTANYL CITRATE 50 MCG: 50 INJECTION, SOLUTION INTRAMUSCULAR; INTRAVENOUS at 14:35

## 2022-01-27 RX ADMIN — SEVELAMER HYDROCHLORIDE 800 MG: 800 TABLET, FILM COATED PARENTERAL at 17:18

## 2022-01-27 RX ADMIN — LIDOCAINE 5% 1 PATCH: 700 PATCH TOPICAL at 08:59

## 2022-01-27 RX ADMIN — NIFEDIPINE 30 MG: 30 TABLET, FILM COATED, EXTENDED RELEASE ORAL at 17:18

## 2022-01-27 RX ADMIN — SODIUM BICARBONATE 650 MG TABLET 1300 MG: at 17:18

## 2022-01-27 RX ADMIN — OXYCODONE HYDROCHLORIDE 5 MG: 5 TABLET ORAL at 20:59

## 2022-01-27 RX ADMIN — HYDROMORPHONE HYDROCHLORIDE 0.5 MG: 1 INJECTION, SOLUTION INTRAMUSCULAR; INTRAVENOUS; SUBCUTANEOUS at 17:18

## 2022-01-27 RX ADMIN — CARVEDILOL 12.5 MG: 12.5 TABLET, FILM COATED ORAL at 17:18

## 2022-01-27 RX ADMIN — HYDROMORPHONE HYDROCHLORIDE 0.5 MG: 1 INJECTION, SOLUTION INTRAMUSCULAR; INTRAVENOUS; SUBCUTANEOUS at 22:42

## 2022-01-27 RX ADMIN — SEVELAMER HYDROCHLORIDE 800 MG: 800 TABLET, FILM COATED PARENTERAL at 12:00

## 2022-01-27 NOTE — BRIEF OP NOTE (RAD/CATH)
INTERVENTIONAL RADIOLOGY PROCEDURE NOTE    Date: 1/27/2022    Procedure: Right IJV tunneled HD catheter placement    Preoperative diagnosis:   1  Renal failure    2  Acute kidney injury superimposed on chronic kidney disease ([facility] [address] )         Postoperative diagnosis: Same  Surgeon: José Miguel Davis MD     Assistant: None  No qualified resident was available  Blood loss: 5 mL    Specimens: None     Findings: Successful right IJV tunneled HD catheter placement    Complications: None immediate      Anesthesia: conscious sedation and local

## 2022-01-27 NOTE — PROGRESS NOTES
[facility] Internal Medicine Progress Note  Patient: Ivon Ward 57 y o  male   MRN: [id number]  PCP: Dayami Herman MD  Unit/Bed#: [address] [place] [address] Encounter: [id number]  Date Of Visit: 01/27/22    Problem List:    Principal Problem:    Acute kidney injury superimposed on chronic kidney disease ([facility] [address] )  Active Problems:    [facility] ([facility] )    Diabetes mellitus ([facility] )    Hypertension    TIA (transient ischemic attack)    Anemia      Assessment & Plan:    * Acute kidney injury superimposed on chronic kidney disease [facility])  Assessment & Plan  Lab Results   Component Value Date    EGFR 6 01/27/2022    EGFR 6 01/26/2022    EGFR 7 01/25/2022    CREATININE 8 19 (H) 01/27/2022    CREATININE 7 92 (H) 01/26/2022    CREATININE 7 36 (H) 01/25/2022     Patient has history of CKD stage IV  Baseline creatinine around 4 3-4 7  Admission creatinine was 5 79  Patient also noted to have significant anasarca with minimally elevated potassium at 5 1  Etiology suspected to be acute kidney injury related to possible cardiorenal syndrome, use of NSAIDs or progression of CKD  Patient's previous SPEP/UPEP were negative and chronic hepatitis panel was negative  C3-C4 not low and NURIA was negative  GBM antibodies negative  ANCA panel ordered by Nephrology; pending  CT abdomen/pelvis 1/23 showed no hydronephrosis or urinary tract calculus  UA showed trace amount of blood and protein  Per nephrology, discontinued Actos as it can cause fluid retention  Avoid nephrotoxic agents and hypotension  After consult with IR and Nephrology, tentatively scheduled for kidney biopsy 1/31; continue to hold Plavix; hold heparin 6 hours prior to procedure  Nephrology suggesting DDAVP 20 micrograms IV x1 30 minutes prior to biopsy  Creatinine has increased to 8 19 today  Continue Lasix 80mg IV BID   Continue IV albumin 25g Q12h  Nephrology planning for tunneled dialysis catheter today    T10 vertebral fracture [facility])  Assessment & Plan  Patient reported severe low back pain x1 week prior to admission after slipping on a rug  Denies any bowel or bladder incontinence, tingling or numbness in the legs  Patient received fentanyl and 2 doses of Dilaudid in the ED with persistent pain  Lumbar x-ray showed no fractures or malalignment with possible ankylosing spondylitis  CT abdomen/pelvis 1/23 showed minimally displaced oblique fracture of the T10 vertebral body without extending into the posterior elements without any real bony retropulsion  Thoracic spine x-ray 1/24 does not visualize fracture well  Dr Lynn Chen discussed with Neurosurgery Dr Stewart Burgess - recommending TSLO brace and outpatient follow-up with neurosurgery  Continue acetaminophen 650 mg Q6h and Lidoderm 5% patch daily for pain management - d/w patient   Continue oxycodone 5mg along with 2 mg Q6h PRN and Dilaudid 0 5mg Q4h PRN breakthrough pain - d/w patient and will adjust meds based on response    Diabetes mellitus [facility])  Assessment & Plan  Lab Results   Component Value Date    HGBA1C 6 6 (H) 01/24/2022       Recent Labs     01/26/22  1609 01/26/22  2132 01/27/22  0707 01/27/22  1110   POCGLU 124 138 104 114     Holding home linagliptin and Actos  Continue Humalog sliding scale with Accu-Cheks q a c  and HS  Blood sugars remain stable  Anemia  Assessment & Plan  Results from last 7 days   Lab Units 01/27/22  0455 01/26/22  0532 01/25/22  0548 01/24/22  0530 01/23/22  0504 01/22/22  0804 01/21/22  1218   HEMOGLOBIN g/dL 8 0* 7 9* 7 4* 7 5* 7 8* 8 7* 10 0*   HEMATOCRIT % 24 7* 25 2* 23 5* 24 0* 25 7* 28 0* 32 2*   MCV fL 93 93 93 94 96 94 93     Patient's hemoglobin dropped from 10-7 8  MCV normal   Baseline hemoglobin appears to be 8-8 5  Admission hemoglobin level of 10 questionable for accuracy  No evidence of active bleeding    Iron panel showed iron level of 34, TIBC of 163 and ferritin level of 61 suggesting iron deficiency-pending stool occult blood  Transfuse PRBC if hemoglobin drops < 7  Given his history of TIA to avoid SURINDER for now    TIA (transient ischemic attack)  Assessment & Plan  Continue home statin  Hold Plavix in preparation for kidney biopsy     Hypertension  Assessment & Plan  Continue home nifedipine 30 mg PO BID and Coreg 12 5 mg PO BID with holding parameters  Diarrhea-resolved as of 2022  Assessment & Plan  Patient complained of some diarrhea after admission  Diarrhea has since resolved  Stool for bacterial panel and C diff toxin were negative  CT scan of the abdomen pelvis showed no acute pathology  Abdominal exam continues to be benign  VTE Pharmacologic Prophylaxis:   High Risk (Score >/= 5) - Pharmacological DVT Prophylaxis Ordered: heparin  Sequential Compression Devices Ordered  Patient Centered Rounds: I performed bedside rounds with nursing staff today  Discussions with Specialists or Other Care Team Provider: renal    Education and Discussions with Family / Patient: yes    Time Spent for Care: 30 minutes  More than 50% of total time spent on counseling and coordination of care as described above  Current Length of Stay: 6 day(s)  Current Patient Status: Inpatient   Certification Statement: The patient will continue to require additional inpatient hospital stay due to acute kidney injury on CKD  Discharge Plan: Anticipate discharge in >72 hrs to discharge location to be determined pending rehab evaluations  Code Status: Level 1 - Full Code    Subjective:   Patient awaiting dialysis catheter placement today  Denies new complaints  Reports some breakthrough back pain this morning but overall pain regimen is improving comfort  Objective:     Vitals:   Temp (24hrs), Av 3 °F (36 8 °C), Min:98 2 °F (36 8 °C), Max:98 3 °F (36 8 °C)    Temp:  [98 2 °F (36 8 °C)-98 3 °F (36 8 °C)] 98 2 °F (36 8 °C)  HR:  [66-78] 78  Resp:  [18-20] 20  BP: (135-154)/(46-77) 148/63  SpO2:  [95 %-98 %] 98 %  Body mass index is 37 81 kg/m²       Input and Output Summary (last 24 hours): Intake/Output Summary (Last 24 hours) at 1/27/2022 1235  Last data filed at 1/27/2022 0600  Gross per 24 hour   Intake 700 ml   Output 500 ml   Net 200 ml       Physical Exam:   Physical Exam  Constitutional:       General: He is not in acute distress  Appearance: He is obese  HENT:      Head: Normocephalic and atraumatic  Eyes:      Conjunctiva/sclera: Conjunctivae normal    Cardiovascular:      Rate and Rhythm: Normal rate and regular rhythm  Pulmonary:      Effort: Pulmonary effort is normal  No respiratory distress  Breath sounds: Normal breath sounds  No wheezing or rales  Abdominal:      General: Bowel sounds are normal       Palpations: Abdomen is soft  Tenderness: There is no abdominal tenderness  There is no guarding  Musculoskeletal:      Right lower leg: Edema present  Left lower leg: Edema present  Comments: Edema improving   Neurological:      Mental Status: He is alert and oriented to person, place, and time  Additional Data:     Labs:  Results from last 7 days   Lab Units 01/27/22  0455 01/24/22  0530 01/23/22  0504   WBC Thousand/uL 7 36   < > 5 17   HEMOGLOBIN g/dL 8 0*   < > 7 8*   HEMATOCRIT % 24 7*   < > 25 7*   PLATELETS Thousands/uL 307   < > 289   NEUTROS PCT %  --   --  57   LYMPHS PCT %  --   --  22   MONOS PCT %  --   --  16*   EOS PCT %  --   --  3    < > = values in this interval not displayed       Results from last 7 days   Lab Units 01/27/22  0455 01/22/22  0804 01/21/22  1218   SODIUM mmol/L 138   < > 140   POTASSIUM mmol/L 4 3   < > 5 1   CHLORIDE mmol/L 102   < > 108   CO2 mmol/L 20*   < > 19*   BUN mg/dL 91*   < > 63*   CREATININE mg/dL 8 19*   < > 5 79*   ANION GAP mmol/L 16*   < > 13   CALCIUM mg/dL 7 7*   < > 7 9*   ALBUMIN g/dL  --   --  2 3*   TOTAL BILIRUBIN mg/dL  --   --  0 19*   ALK PHOS U/L  --   --  104   ALT U/L  --   --  32   AST U/L  --   --  37   GLUCOSE RANDOM mg/dL 108   < > 149*    < > = values in this interval not displayed  Results from last 7 days   Lab Units 01/25/22  0548   INR  1 02     Results from last 7 days   Lab Units 01/27/22  1110 01/27/22  0707 01/26/22  2132 01/26/22  1609 01/26/22  1037 01/26/22  0700 01/25/22  2058 01/25/22  1612 01/25/22  1133 01/25/22  0718 01/24/22  2119 01/24/22  1608   POC GLUCOSE mg/dl 114 104 138 124 118 132 149* 143* 137 103 119 142*     Results from last 7 days   Lab Units 01/24/22  0530   HEMOGLOBIN A1C % 6 6*           Lines/Drains:  Invasive Devices  Report    Peripheral Intravenous Line            Peripheral IV 01/24/22 Dorsal (posterior); Right Hand 3 days                       Imaging: No pertinent imaging reviewed      Recent Cultures (last 7 days):   Results from last 7 days   Lab Units 01/22/22  1831   C DIFF TOXIN B BY PCR  Negative       Last 24 Hours Medication List:   Current Facility-Administered Medications   Medication Dose Route Frequency Provider Last Rate    acetaminophen  650 mg Oral Q6H [facility] Lore Silver DO      albumin human  25 g Intravenous BID (diuretic) Vianey Solorzano MD      allopurinol  100 mg Oral Daily Amber Stanley MD      atorvastatin  80 mg Oral Daily Amber Stanley MD      carvedilol  12 5 mg Oral BID Amber Stanley MD      furosemide  80 mg Intravenous BID (diuretic) Vianey Solorzano MD      HYDROmorphone  0 5 mg Intravenous Q4H PRN Lore Silver DO      HYDROmorphone  1 mg Intravenous Once Amber Stanley MD      insulin lispro  1-5 Units Subcutaneous HS Amebr Stanley MD      insulin lispro  2-12 Units Subcutaneous TID AC Joel Dalton MD      lidocaine  1 patch Topical Daily Amber Stanley MD      NIFEdipine  30 mg Oral BID So Saldivar MD      ondansetron  4 mg Intravenous Q6H PRN Amber Stanley MD      oxyCODONE  2 5 mg Oral Q6H PRN Amber Stanley MD      oxyCODONE  5 mg Oral Q6H PRN Lore Silver DO      sevelamer  800 mg Oral TID With Meals Nestor Perkins MD      sodium bicarbonate  1,300 mg Oral TID after meals Gloria Hubbard MD          Today, Patient Was Seen By: Barry Patino DO    ** Please Note: "This note has been constructed using a voice recognition system  Therefore there may be syntax, spelling, and/or grammatical errors   Please call if you have any questions  "**

## 2022-01-27 NOTE — CONSULTS
e-Consult (IP[facility])  - Interventional Radiology  Bill Moody 57 y o  male MRN: [id number]  Unit/Bed#: [address] [address] Encounter: [id number]    IR has been consulted to evaluate the patient, determine the appropriate procedure, and whether or not a procedure can and should be performed regarding the care of Bill Mooyd  Consults  01/27/22      Assessment/Recommendation:   79-year-old male with acute on chronic kidney disease will require dialysis and is referred for tunneled hemodialysis catheter placement  - plan for tunneled hemodialysis catheter placement today  - please keep patient NPO  Total time spent in review of data, discussion with requesting provider and rendering advice was 10 min  Thank you for allowing Interventional Radiology to participate in the care of Bill Moody  Please don't hesitate to call or TigerText us with any questions       Dyana Coleman MD

## 2022-01-27 NOTE — SEDATION DOCUMENTATION
Attempted to lay pt back on procedure table, pt in severe pain unable to lay flat  Pt sat back up, Dr Zaida Fox at bedside  After discussion with patient fentanyl given, after a few minutes pt able to lie back with assistance  Prepped for procedure

## 2022-01-27 NOTE — PROGRESS NOTES
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, TIA, diastolic CHF, who initially presents end of prior week with back pain   After a fall   Nephrology is on board for acute kidney injury   Patient was also initially noted to be hypertensive       1) DEVIN on CKD IV-V     - baseline creatinine 4 3-4 7 mg/dL (but to note, creatinine in February 2020 was 1 9, rising to 3 3 in July 2021, and rising further to 4 5-4 7 in November 2021)  -during prior admission in November, creatinine stabilized to 4 7 at [facility] and chlorthalidone were held  Jonah Quick was planned but could not be completed due to body habitus   -admission creatinine 5 8 mg/dL  -patient was initially started on furosemide IV with albumin  -urinalysis with 0-1 RBC, 0-1 WBC, 2+ protein  -urine protein creatinine ratio 10 7 g  -CT scan-kidneys without hydronephrosis, renal hypodensities too small to characterize likely benign   Minimally displaced oblique fracture involving T10   -creatinine rising 6 4 mg/dL on January 23rd  -SPEP, UPEP-unrevealing prior  -chronic hepatitis panel-negative prior  -C3, C4 was not low prior  -NURIA negative  -ANCA-  -anti phospholipase A2 antibody receptor-  -anti GBM-three  -A1c is 6 6%     Etiology of acute kidney injury is unclear   May need to consider membranous versus minimal change versus other   Does not appear nephritic   A1c is well controlled   To note, patient was using NSAIDs 3 tablets 3 to 4 times a day for 1 week prior to admission   Therefore there may be a component of NSAID induced nephropathy also     1/24-creatinine continues to rise to 6 9 mg/dL   Patient has nephrotic range proteinuria   No hematuria   No hydronephrosis   Was on furosemide 3 times a day     -1/25-creatinine continues to rise is 7 4 mg/dL   Lower furosemide slightly     -1/26-creatinine rising 7 9 mg/dL  Continue furosemide twice a day and albumin twice a day  8 2 mg/dL  -January 26-creatinine continues to rise    Dialysis catheter being planned today  - consent for dialysis obtained 1/27 and placed paper consent to be scanned     Plan     -maintain on NPO for now  -dialysis catheter today per IR team with likely tunneled catheter-reviewed with the IR team  -continue Lasix and albumin  -continue phosphorus binders  -continue sodium bicarbonate tablets for now  -plan to start short dialysis treatment tomorrow and then on Saturday  -hepatitis panel in process  - follow-up ANCA level  -follow-up MEGHANA 2R antibody  -renal biopsy potentially on Monday  -case management for outpatient dialysis placement  Continue to hold Plavix for now due to potential biopsy on Monday  -check stool occult  -  radiology team is on board for biopsy potentially next Monday  -biopsy form sent 1/25 to IR  -patient is agreeable to be NPO overnight in case needs dialysis catheter tomorrow (to know last dose of Plavix was 1/24)  -may need blood transfusion prior to biopsy  -avoid all NSAIDs as doing  - will need DDAVP prior to renal biopsy 30 minutes before -20 mcg IV 1 time  - will need to be NPO Sunday night into Monday  - AM heparin sq will need to be held Monday AM for biopsy (1/31) also     2) back pain     -patient had fall at home 1 week prior to admission  -per primary team  -pain control per primary team     3) hypertension-     -on nifedipine and carvedilol     4) acid/base-     -bicarbonate lower at 16 and started on bicarbonate tablets and serum bicarbonate 17 on 01/24  -on bicarbonate tablets-continue     5) anemia-     -hemoglobin low at 7 4  -iron panel-iron saturation 20%  -received Venofer prior admission in November  -history of TIA, will avoid SURINDER for now but eventually may need to consider and we discussed with the patient  -decreasing hemoglobin is likely a factor of CKD but initial hemoglobin was 10 on 01/21 and now has decreased to 7 5   Unclear accuracy of 10  -transfusion per primary team  -iron panel with iron saturation 21%     6) T10 vertebral fracture-per primary team     7) electrolytes-sodium is stable, potassium is stable    SUBJECTIVE / 24H INTERVAL HISTORY:    Blood pressure is 518-623 systolic  Afebrile  On room air  Still with significant edema lower extremities      OBJECTIVE:  Current Weight: Weight - Scale: 130 kg (286 lb 9 6 oz)  Vitals:    01/26/22 1715 01/26/22 1945 01/26/22 2000 01/26/22 2354   BP: 154/73 147/77  138/65   BP Location:       Pulse:  68  78   Resp:  18  20   Temp:  98 3 °F (36 8 °C)  98 2 °F (36 8 °C)   TempSrc:  Oral  Oral   SpO2:   96% 98%   Weight:       Height:           Intake/Output Summary (Last 24 hours) at 1/27/2022 0827  Last data filed at 1/27/2022 0600  Gross per 24 hour   Intake 700 ml   Output 500 ml   Net 200 ml     General: NAD  Skin: no rash  Eyes: anicteric sclera  ENT: moist mucous membrane  Neck: supple  Chest: CTA b/l, no ronchii, no wheeze, no rubs, no rales  CVS: s1s2, no murmur, no gallop, no rub  Abdomen: soft, nontender, nl sounds, slightly distended/edema  Extremities:  2+ edema lower extremities to the thighs  : no gaitan  Neuro: AAOX3  Psych: normal affect      Medications:    Current Facility-Administered Medications:     acetaminophen (TYLENOL) tablet 650 mg, 650 mg, Oral, Q6H [facility], Lore Silver DO, 650 mg at 01/27/22 0617    albumin human (FLEXBUMIN) 25 % injection 25 g, 25 g, Intravenous, BID (diuretic), Lino Corado MD, 25 g at 01/26/22 1640    allopurinol (ZYLOPRIM) tablet 100 mg, 100 mg, Oral, Daily, Kelly Dalton MD, 100 mg at 01/26/22 0829    atorvastatin (LIPITOR) tablet 80 mg, 80 mg, Oral, Daily, Kelly Dalton MD, 80 mg at 01/26/22 0830    carvedilol (COREG) tablet 12 5 mg, 12 5 mg, Oral, BID, Arianna Restrepo MD, 12 5 mg at 01/26/22 1730    furosemide (LASIX) injection 80 mg, 80 mg, Intravenous, BID (diuretic), Laine Oppenheim, MD, 80 mg at 01/26/22 1716    HYDROmorphone (DILAUDID) injection 0 5 mg, 0 5 mg, Intravenous, Q4H PRN, Lore Revankar, DO, 0 5 mg at 01/27/22 0128    HYDROmorphone (DILAUDID) injection 1 mg, 1 mg, Intravenous, Once, Arianna Restrepo MD    insulin lispro (HumaLOG) 100 units/mL subcutaneous injection 1-5 Units, 1-5 Units, Subcutaneous, HS, Arianna Restrepo MD    insulin lispro (HumaLOG) 100 units/mL subcutaneous injection 2-12 Units, 2-12 Units, Subcutaneous, TID AC, 2 Units at 01/21/22 1744 **AND** Fingerstick Glucose (POCT), , , TID AC, Arianna Restrepo MD    lidocaine (LIDODERM) 5 % patch 1 patch, 1 patch, Topical, Daily, Arianna Restrepo MD, 1 patch at 01/26/22 0829    NIFEdipine (PROCARDIA XL) 24 hr tablet 30 mg, 30 mg, Oral, BID, Rashid Crawford MD, 30 mg at 01/26/22 1730    ondansetron (ZOFRAN) injection 4 mg, 4 mg, Intravenous, Q6H PRN, Arianna Restrepo MD    oxyCODONE (ROXICODONE) IR tablet 2 5 mg, 2 5 mg, Oral, Q6H PRN, Arianna Restrepo MD    oxyCODONE (ROXICODONE) IR tablet 5 mg, 5 mg, Oral, Q6H PRN, Lore Amadorar, DO, 5 mg at 01/26/22 2355    sevelamer (RENAGEL) tablet 800 mg, 800 mg, Oral, TID With Meals, Laine Oppenheim, MD, 800 mg at 01/26/22 1640    sodium bicarbonate tablet 1,300 mg, 1,300 mg, Oral, TID after meals, Rashid Crawford MD, 1,300 mg at 01/26/22 1640    Laboratory Results:  Results from last 7 days   Lab Units 01/27/22  0455 01/26/22  0532 01/25/22  0548 01/24/22  0530 01/23/22  0504 01/22/22  0804 01/21/22  1218   WBC Thousand/uL 7 36 7 85 7 12 6 67 5 17 6 53 8 38   HEMOGLOBIN g/dL 8 0* 7 9* 7 4* 7 5* 7 8* 8 7* 10 0*   HEMATOCRIT % 24 7* 25 2* 23 5* 24 0* 25 7* 28 0* 32 2*   PLATELETS Thousands/uL 307 297 272 284 289 302 347   POTASSIUM mmol/L 4 3 4 5 4 7 4 6 4 6 4 5 5 1   CHLORIDE mmol/L 102 103 102 102 104 104 108   CO2 mmol/L 20* 19* 16* 17* 16* 17* 19*   BUN mg/dL 91* 85* 84* 76* 70* 70* 63*   CREATININE mg/dL 8 19* 7 92* 7 36* 6 93* 6 41* 5 79* 5 79*   CALCIUM mg/dL 7 7* 7 8* 7 5* 7 6* 7 7* 7 5* 7 9*   MAGNESIUM mg/dL 1 9 1 9 1 8  --   --   --  2 1   PHOSPHORUS mg/dL 7 6* 7 4* 7 4*  --   --   --   --

## 2022-01-28 ENCOUNTER — APPOINTMENT (INPATIENT)
Dept: DIALYSIS | Facility: HOSPITAL | Age: 62
DRG: 674 | End: 2022-01-28
Attending: INTERNAL MEDICINE
Payer: MEDICARE

## 2022-01-28 LAB
ANION GAP SERPL CALCULATED.3IONS-SCNC: 15 MMOL/L (ref 4–13)
BUN SERPL-MCNC: 89 MG/DL (ref 5–25)
CALCIUM SERPL-MCNC: 7.8 MG/DL (ref 8.3–10.1)
CHLORIDE SERPL-SCNC: 100 MMOL/L (ref 100–108)
CO2 SERPL-SCNC: 22 MMOL/L (ref 21–32)
CREAT SERPL-MCNC: 8.07 MG/DL (ref 0.6–1.3)
GFR SERPL CREATININE-BSD FRML MDRD: 6 ML/MIN/1.73SQ M
GLUCOSE SERPL-MCNC: 108 MG/DL (ref 65–140)
GLUCOSE SERPL-MCNC: 113 MG/DL (ref 65–140)
GLUCOSE SERPL-MCNC: 115 MG/DL (ref 65–140)
GLUCOSE SERPL-MCNC: 143 MG/DL (ref 65–140)
GLUCOSE SERPL-MCNC: 147 MG/DL (ref 65–140)
POTASSIUM SERPL-SCNC: 4.3 MMOL/L (ref 3.5–5.3)
SODIUM SERPL-SCNC: 137 MMOL/L (ref 136–145)

## 2022-01-28 PROCEDURE — 99232 SBSQ HOSP IP/OBS MODERATE 35: CPT | Performed by: FAMILY MEDICINE

## 2022-01-28 PROCEDURE — 80048 BASIC METABOLIC PNL TOTAL CA: CPT | Performed by: INTERNAL MEDICINE

## 2022-01-28 PROCEDURE — 82948 REAGENT STRIP/BLOOD GLUCOSE: CPT

## 2022-01-28 PROCEDURE — 90935 HEMODIALYSIS ONE EVALUATION: CPT | Performed by: INTERNAL MEDICINE

## 2022-01-28 RX ORDER — FUROSEMIDE 10 MG/ML
80 INJECTION INTRAMUSCULAR; INTRAVENOUS EVERY 8 HOURS
Status: DISCONTINUED | OUTPATIENT
Start: 2022-01-28 | End: 2022-02-01

## 2022-01-28 RX ORDER — FUROSEMIDE 10 MG/ML
80 INJECTION INTRAMUSCULAR; INTRAVENOUS
Status: DISCONTINUED | OUTPATIENT
Start: 2022-01-28 | End: 2022-01-28

## 2022-01-28 RX ADMIN — SEVELAMER HYDROCHLORIDE 800 MG: 800 TABLET, FILM COATED PARENTERAL at 07:48

## 2022-01-28 RX ADMIN — ALBUMIN (HUMAN) 25 G: 0.25 INJECTION, SOLUTION INTRAVENOUS at 08:38

## 2022-01-28 RX ADMIN — LIDOCAINE 5% 1 PATCH: 700 PATCH TOPICAL at 11:45

## 2022-01-28 RX ADMIN — ACETAMINOPHEN 650 MG: 325 TABLET, FILM COATED ORAL at 05:28

## 2022-01-28 RX ADMIN — ACETAMINOPHEN 650 MG: 325 TABLET, FILM COATED ORAL at 23:01

## 2022-01-28 RX ADMIN — SEVELAMER HYDROCHLORIDE 800 MG: 800 TABLET, FILM COATED PARENTERAL at 16:12

## 2022-01-28 RX ADMIN — FUROSEMIDE 80 MG: 10 INJECTION, SOLUTION INTRAMUSCULAR; INTRAVENOUS at 11:43

## 2022-01-28 RX ADMIN — ATORVASTATIN CALCIUM 80 MG: 80 TABLET, FILM COATED ORAL at 11:43

## 2022-01-28 RX ADMIN — ALLOPURINOL 100 MG: 100 TABLET ORAL at 11:43

## 2022-01-28 RX ADMIN — NIFEDIPINE 30 MG: 30 TABLET, FILM COATED, EXTENDED RELEASE ORAL at 11:46

## 2022-01-28 RX ADMIN — ACETAMINOPHEN 650 MG: 325 TABLET, FILM COATED ORAL at 11:48

## 2022-01-28 RX ADMIN — NIFEDIPINE 30 MG: 30 TABLET, FILM COATED, EXTENDED RELEASE ORAL at 17:38

## 2022-01-28 RX ADMIN — CARVEDILOL 12.5 MG: 12.5 TABLET, FILM COATED ORAL at 17:38

## 2022-01-28 RX ADMIN — ACETAMINOPHEN 650 MG: 325 TABLET, FILM COATED ORAL at 17:38

## 2022-01-28 RX ADMIN — HYDROMORPHONE HYDROCHLORIDE 0.5 MG: 1 INJECTION, SOLUTION INTRAMUSCULAR; INTRAVENOUS; SUBCUTANEOUS at 08:43

## 2022-01-28 RX ADMIN — FUROSEMIDE 80 MG: 10 INJECTION, SOLUTION INTRAMUSCULAR; INTRAVENOUS at 16:12

## 2022-01-28 RX ADMIN — SEVELAMER HYDROCHLORIDE 800 MG: 800 TABLET, FILM COATED PARENTERAL at 11:48

## 2022-01-28 RX ADMIN — CARVEDILOL 12.5 MG: 12.5 TABLET, FILM COATED ORAL at 11:43

## 2022-01-28 NOTE — PROGRESS NOTES
[facility] Internal Medicine Progress Note  Patient: Tyrone Roldan 57 y o  male   MRN: [id number]  PCP: Julius Teague MD  Unit/Bed#: [address] [place] [address] Encounter: [id number]  Date Of Visit: 01/28/22    Problem List:    Principal Problem:    Acute kidney injury superimposed on chronic kidney disease ([facility] )  Active Problems:    T10 vertebral fracture ([facility] [address] )    Diabetes mellitus ([facility] )    Hypertension    TIA (transient ischemic attack)    Anemia      Assessment & Plan:    * Acute kidney injury superimposed on chronic kidney disease [facility])  Assessment & Plan  Lab Results   Component Value Date    EGFR 6 01/28/2022    EGFR 6 01/27/2022    EGFR 6 01/26/2022    CREATININE 8 07 (H) 01/28/2022    CREATININE 8 19 (H) 01/27/2022    CREATININE 7 92 (H) 01/26/2022     Patient has history of CKD stage IV  Baseline creatinine around 4 3-4 7  Admission creatinine was 5 79  Patient also noted to have significant anasarca with minimally elevated potassium at 5 1  Etiology suspected to be acute kidney injury related to possible cardiorenal syndrome, use of NSAIDs or progression of CKD  Patient's previous SPEP/UPEP were negative and chronic hepatitis panel was negative  C3-C4 not low and NURIA was negative  GBM antibodies and ANCA panel negative  CT abdomen/pelvis 1/23 showed no hydronephrosis or urinary tract calculus  UA showed trace amount of blood and protein  Per nephrology, discontinued Actos as it can cause fluid retention  Avoid nephrotoxic agents and hypotension  After consult with IR and Nephrology, tentatively scheduled for kidney biopsy 1/31; continue to hold Plavix; hold heparin 6 hours prior to procedure  Nephrology suggesting DDAVP 20 micrograms IV x1 30 minutes prior to biopsy  Tunneled dialysis catheter placed 1/27; monitor site for drainage/oozing  Lasix increased to 80mg IV TID  IV albumin 25g Q12h d/c'ed  Patient to receive hemodialysis today and tomorrow   Case management for outpatient dialysis placement  Sodium bicarb tablets discontinued today    T10 vertebral fracture [facility])  Assessment & Plan  Patient reported severe low back pain x1 week prior to admission after slipping on a rug  Denies any bowel or bladder incontinence, tingling or numbness in the legs  Patient received fentanyl and 2 doses of Dilaudid in the ED with persistent pain  Lumbar x-ray showed no fractures or malalignment with possible ankylosing spondylitis  CT abdomen/pelvis 1/23 showed minimally displaced oblique fracture of the T10 vertebral body without extending into the posterior elements without any real bony retropulsion  Thoracic spine x-ray 1/24 does not visualize fracture well  Dr Cristofer Benavides discussed with Neurosurgery Dr Jerome Gagnon - recommending TSLO brace and outpatient follow-up with neurosurgery  Continue acetaminophen 650 mg Q6h and Lidoderm 5% patch daily for pain management - d/w patient   Continue oxycodone 5mg along with 2 mg Q6h PRN and Dilaudid 0 5mg Q4h PRN breakthrough pain - d/w patient and will adjust meds based on response    Diabetes mellitus [facility])  Assessment & Plan  Lab Results   Component Value Date    HGBA1C 6 6 (H) 01/24/2022       Recent Labs     01/27/22  1619 01/27/22  2033 01/28/22  0626 01/28/22  1156   POCGLU 172* 82 108 115     Holding home linagliptin and Actos  Continue Humalog sliding scale with Accu-Cheks q a c  and HS  Blood sugars remain stable  Anemia  Assessment & Plan  Results from last 7 days   Lab Units 01/27/22  0455 01/26/22  0532 01/25/22  0548 01/24/22  0530 01/23/22  0504 01/22/22  0804   HEMOGLOBIN g/dL 8 0* 7 9* 7 4* 7 5* 7 8* 8 7*   HEMATOCRIT % 24 7* 25 2* 23 5* 24 0* 25 7* 28 0*   MCV fL 93 93 93 94 96 94     Patient's hemoglobin dropped from 10-7 8  MCV normal   Baseline hemoglobin appears to be 8-8 5  Admission hemoglobin level of 10 questionable for accuracy  No evidence of active bleeding    Iron panel showed iron level of 34, TIBC of 163 and ferritin level of 61 suggesting iron deficiency-pending stool occult blood  Transfuse PRBC if hemoglobin drops < 7  Given his history of TIA to avoid SURINDER for now  TIA (transient ischemic attack)  Assessment & Plan  Continue home statin  Hold Plavix in preparation for kidney biopsy     Hypertension  Assessment & Plan  Continue home nifedipine 30 mg PO BID and Coreg 12 5 mg PO BID with holding parameters    Diarrhea-resolved as of 2022  Assessment & Plan  Patient complained of some diarrhea after admission  Diarrhea has since resolved  Stool for bacterial panel and C diff toxin were negative  CT scan of the abdomen pelvis showed no acute pathology  Abdominal exam continues to be benign            VTE Pharmacologic Prophylaxis:   None due to dialysis catheter placement    Patient Centered Rounds: I performed bedside rounds with nursing staff today  Discussions with Specialists or Other Care Team Provider: yes    Education and Discussions with Family / Patient: yes    Time Spent for Care: 30 minutes  More than 50% of total time spent on counseling and coordination of care as described above  Current Length of Stay: 7 day(s)  Current Patient Status: Inpatient   Certification Statement: The patient will continue to require additional inpatient hospital stay due to acute kidney injury, dialysis dependent  Discharge Plan: Anticipate discharge in >72 hrs to discharge location to be determined pending rehab evaluations  Code Status: Level 1 - Full Code    Subjective:     Reports feeling wiped out after dialysis  Denies pain/oozing/drainage at catheter placement site  Back pain controlled with analgesic regimen with some breakthrough pain  Denies fever/chills, abdominal pain, N/V       Objective:     Vitals:   Temp (24hrs), Av 1 °F (36 7 °C), Min:98 °F (36 7 °C), Max:98 1 °F (36 7 °C)    Temp:  [98 °F (36 7 °C)-98 1 °F (36 7 °C)] 98 1 °F (36 7 °C)  HR:  [65-74] 74  Resp:  [16-18] 18  BP: (128-164)/(51-76) 162/75  SpO2:  [93 %-98 %] 98 %  Body mass index is 37 81 kg/m²  Input and Output Summary (last 24 hours): Intake/Output Summary (Last 24 hours) at 1/28/2022 1414  Last data filed at 1/28/2022 1054  Gross per 24 hour   Intake 500 ml   Output 1000 ml   Net -500 ml       Physical Exam:   Physical Exam  Constitutional:       General: He is not in acute distress  Appearance: He is obese  HENT:      Head: Normocephalic and atraumatic  Eyes:      Conjunctiva/sclera: Conjunctivae normal    Cardiovascular:      Rate and Rhythm: Normal rate and regular rhythm  Pulmonary:      Effort: Pulmonary effort is normal  No respiratory distress  Breath sounds: Normal breath sounds  No wheezing or rales  Abdominal:      General: Bowel sounds are normal  There is no distension  Palpations: Abdomen is soft  Tenderness: There is no abdominal tenderness  Musculoskeletal:      Right lower leg: Edema present  Left lower leg: Edema present  Comments: + tunneled dialysis catheter to right upper thorax; covered with occlusive dressing; no surrounding erythema, drainage, or discharge  Neurological:      Mental Status: He is alert and oriented to person, place, and time  Additional Data:     Labs:  Results from last 7 days   Lab Units 01/27/22  0455 01/24/22  0530 01/23/22  0504   WBC Thousand/uL 7 36   < > 5 17   HEMOGLOBIN g/dL 8 0*   < > 7 8*   HEMATOCRIT % 24 7*   < > 25 7*   PLATELETS Thousands/uL 307   < > 289   NEUTROS PCT %  --   --  57   LYMPHS PCT %  --   --  22   MONOS PCT %  --   --  16*   EOS PCT %  --   --  3    < > = values in this interval not displayed       Results from last 7 days   Lab Units 01/28/22  0536   SODIUM mmol/L 137   POTASSIUM mmol/L 4 3   CHLORIDE mmol/L 100   CO2 mmol/L 22   BUN mg/dL 89*   CREATININE mg/dL 8 07*   ANION GAP mmol/L 15*   CALCIUM mg/dL 7 8*   GLUCOSE RANDOM mg/dL 113     Results from last 7 days   Lab Units 01/25/22  0548   INR  1 02     Results from last 7 days   Lab Units 01/28/22  1156 01/28/22  0626 01/27/22  2033 01/27/22  1619 01/27/22  1110 01/27/22  0707 01/26/22  2132 01/26/22  1609 01/26/22  1037 01/26/22  0700 01/25/22  2058 01/25/22  1612   POC GLUCOSE mg/dl 115 108 82 172* 114 104 138 124 118 132 149* 143*     Results from last 7 days   Lab Units 01/24/22  0530   HEMOGLOBIN A1C % 6 6*           Lines/Drains:  Invasive Devices  Report    Peripheral Intravenous Line            Peripheral IV 01/24/22 Dorsal (posterior); Right Hand 4 days          Hemodialysis Catheter            HD Permanent Double Catheter <1 day                      Imaging: Reviewed radiology reports from this admission including: procedure reports    Recent Cultures (last 7 days):   Results from last 7 days   Lab Units 01/22/22  1831   C DIFF TOXIN B BY PCR  Negative       Last 24 Hours Medication List:   Current Facility-Administered Medications   Medication Dose Route Frequency Provider Last Rate    acetaminophen  650 mg Oral Q6H [address] Lore Silver DO      allopurinol  100 mg Oral Daily Abel Hdz MD      atorvastatin  80 mg Oral Daily Abel Hdz MD      carvedilol  12 5 mg Oral BID Abel Hdz MD      furosemide  80 mg Intravenous Q8H Brian Castro MD      HYDROmorphone  0 5 mg Intravenous Q4H PRN Lore Silver DO      HYDROmorphone  1 mg Intravenous Once Abel Hdz MD      insulin lispro  1-5 Units Subcutaneous HS Abel Hdz MD      insulin lispro  2-12 Units Subcutaneous TID AC Abel Hdz MD      lidocaine  1 patch Topical Daily Abel Hdz MD      NIFEdipine  30 mg Oral BID Ashley Rodriguez MD      ondansetron  4 mg Intravenous Q6H PRN Abel Hdz MD      oxyCODONE  2 5 mg Oral Q6H PRN Abel Hdz MD      oxyCODONE  5 mg Oral Q6H PRN Lore Silver DO      sevelamer  800 mg Oral TID With Meals Brian Castro MD          Today, Patient Was Seen By: Teo Fernandez DO    ** Please Note: "This note has been constructed using a voice recognition system  Therefore there may be syntax, spelling, and/or grammatical errors   Please call if you have any questions  "**

## 2022-01-28 NOTE — PROGRESS NOTES
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, TIA, diastolic CHF, who initially presents end of prior week with back pain   After a fall   Nephrology is on board for acute kidney injury   Patient was also initially noted to be hypertensive       1) DEVIN on CKD IV-V     - baseline creatinine 4 3-4 7 mg/dL (but to note, creatinine in February 2020 was 1 9, rising to 3 3 in July 2021, and rising further to 4 5-4 7 in November 2021)  -during prior admission in November, creatinine stabilized to 4 7 at [facility] and chlorthalidone were held  Radha Cruise was planned but could not be completed due to body habitus   -admission creatinine 5 8 mg/dL  -patient was initially started on furosemide IV with albumin  -urinalysis with 0-1 RBC, 0-1 WBC, 2+ protein  -urine protein creatinine ratio 10 7 g  -CT scan-kidneys without hydronephrosis, renal hypodensities too small to characterize likely benign   Minimally displaced oblique fracture involving T10   -creatinine rising 6 4 mg/dL on January 23rd  -SPEP, UPEP-unrevealing prior  -chronic hepatitis panel-negative prior  -C3, C4 was not low prior  -NURIA negative  -ANCA-negative  -anti phospholipase A2 antibody receptor-  -anti GBM-three  -A1c is 6 6%     Etiology of acute kidney injury is unclear   May need to consider membranous versus minimal change versus other   Does not appear nephritic   A1c is well controlled   To note, patient was using NSAIDs 3 tablets 3 to 4 times a day for 1 week prior to admission   Therefore there may be a component of NSAID induced nephropathy also     1/24-creatinine continues to rise to 6 9 mg/dL   Patient has nephrotic range proteinuria   No hematuria   No hydronephrosis   Was on furosemide 3 times a day     -1/25-creatinine continues to rise is 7 4 mg/dL   Lower furosemide slightly     -1/26-creatinine rising 7 9 mg/dL   Continue furosemide twice a day and albumin twice a day  8 2 mg/dL     -January 27-creatinine continues to rise  Dialysis catheter placed by IR with tunnel catheter  - consent for dialysis obtained 1/27 and placed paper consent to be scanned    -January 28-starting dialysis treatment 1       Plan    -patient seen at the start of dialysis and prior to starting dialysis  Patient was started on dialysis and was seen at 8:58 a m  Smita Garcia Tolerating treatment     -can restart heparin subQ over the weekend  -if oozing continues from dialysis catheter site consider IR evaluation  -increase Lasix to 3 times a day  -continue phosphorus binders  -hold bicarbonate tablets  -dialysis treatment 1 Today, treatment 2 Tomorrow-with low flows  -monitor closely for renal recovery  Creatinine is slightly lower today but the patient is still significantly volume overloaded  And urine output is decreasing  We will increase diuretics today    Patient still is dialysis dependent for now   -follow-up MEGHANA 2R antibody  -renal biopsy potentially on Monday-I have messaged IR team to discuss as the patient states he may not feel ready for biopsy Monday   -case management for outpatient dialysis placement  Continue to hold Plavix for now due to potential biopsy on Monday  -check stool occult  -  radiology team is on board for biopsy potentially next Monday  -biopsy form sent 1/25 to IR  -may need blood transfusion prior to biopsy if hemoglobin remains less than 8  -avoid all NSAIDs as doing  - will need DDAVP prior to renal biopsy 30 minutes before -20 mcg IV 1 time  - will need to be NPO Sunday night into Monday  - AM heparin sq will need to be held Monday AM for biopsy (1/31) also     2) back pain     -patient had fall at home 1 week prior to admission  -per primary team  -pain control per primary team     3) hypertension-     -on nifedipine and carvedilol     4) acid/base-     -bicarbonate lower at 16 and started on bicarbonate tablets and serum bicarbonate 17 on 01/24  -on bicarbonate tablets-holding starting January 28th     5) anemia-     -hemoglobin low at 7 4  -iron panel-iron saturation 20%  -received Venofer prior admission in November  -history of TIA, will avoid SURINDER for now but eventually may need to consider and we discussed with the patient  -decreasing hemoglobin is likely a factor of CKD but initial hemoglobin was 10 on 01/21 and now has decreased to 7 5   Unclear accuracy of 10  -transfusion per primary team  -iron panel with iron saturation 21%     6) T10 vertebral fracture-per primary team     7) electrolytes-sodium is stable, potassium is stable    SUBJECTIVE / 24H INTERVAL HISTORY:    Patient still with back pain  Status post dialysis catheter placement  Still with significant edema      OBJECTIVE:  Current Weight: Weight - Scale: 130 kg (286 lb 9 6 oz)  Vitals:    01/27/22 1504 01/27/22 2005 01/27/22 2339 01/28/22 0750   BP: 141/63 128/51 137/57 140/59   BP Location: Left arm  Left arm Left arm   Pulse: 65  68 73   Resp: 18 17 16 18   Temp:   98 °F (36 7 °C) 98 1 °F (36 7 °C)   TempSrc:   Oral Oral   SpO2: 93%   94%   Weight:       Height:           Intake/Output Summary (Last 24 hours) at 1/28/2022 0849  Last data filed at 1/27/2022 2200  Gross per 24 hour   Intake 120 ml   Output 800 ml   Net -680 ml     General: NAD  Skin: no rash  Eyes: anicteric sclera  ENT: moist mucous membrane  Neck: supple  Chest: CTA b/l, no ronchii, no wheeze, no rubs, no rales  CVS: s1s2, no murmur, no gallop, no rub  Extremities:  2+ edema LE b/l  : no gaitan  Neuro: AAOX3  Psych: normal affect    Medications:    Current Facility-Administered Medications:     acetaminophen (TYLENOL) tablet 650 mg, 650 mg, Oral, Q6H KEMAL, Lore Revdelar, DO, 650 mg at 01/28/22 0528    albumin human (FLEXBUMIN) 25 % injection 25 g, 25 g, Intravenous, BID (diuretic), Eliana Moore MD, 25 g at 01/28/22 0838    allopurinol (ZYLOPRIM) tablet 100 mg, 100 mg, Oral, Daily, Diana Giron MD, 100 mg at 01/27/22 0859    atorvastatin (LIPITOR) tablet 80 mg, 80 mg, Oral, Daily, Diana Giron MD, 80 mg at 01/27/22 0858    carvedilol (COREG) tablet 12 5 mg, 12 5 mg, Oral, BID, Diana Giron MD, 12 5 mg at 01/27/22 1718    furosemide (LASIX) injection 80 mg, 80 mg, Intravenous, BID (diuretic), Eliana Moore MD, 80 mg at 01/27/22 1630    HYDROmorphone (DILAUDID) injection 0 5 mg, 0 5 mg, Intravenous, Q4H PRN, Lore Silver DO, 0 5 mg at 01/28/22 0843    HYDROmorphone (DILAUDID) injection 1 mg, 1 mg, Intravenous, Once, Diana Giron MD    insulin lispro (HumaLOG) 100 units/mL subcutaneous injection 1-5 Units, 1-5 Units, Subcutaneous, HS, Diana Giron MD    insulin lispro (HumaLOG) 100 units/mL subcutaneous injection 2-12 Units, 2-12 Units, Subcutaneous, TID AC, 2 Units at 01/27/22 1723 **AND** Fingerstick Glucose (POCT), , , TID AC, Diana Giron MD    lidocaine (LIDODERM) 5 % patch 1 patch, 1 patch, Topical, Daily, Diana Giron MD, 1 patch at 01/27/22 0859    NIFEdipine (PROCARDIA XL) 24 hr tablet 30 mg, 30 mg, Oral, BID, Sammi Hogan MD, 30 mg at 01/27/22 1718    ondansetron (ZOFRAN) injection 4 mg, 4 mg, Intravenous, Q6H PRN, Diana Giron MD    oxyCODONE (ROXICODONE) IR tablet 2 5 mg, 2 5 mg, Oral, Q6H PRN, Diana Giron MD    oxyCODONE (ROXICODONE) IR tablet 5 mg, 5 mg, Oral, Q6H PRN, Lore Silver DO, 5 mg at 01/27/22 2059    sevelamer (RENAGEL) tablet 800 mg, 800 mg, Oral, TID With Meals, Eliana Moore MD, 800 mg at 01/28/22 0748    sodium bicarbonate tablet 1,300 mg, 1,300 mg, Oral, TID after meals, Sammi Hogan MD, 1,300 mg at 01/27/22 1718    Laboratory Results:  Results from last 7 days   Lab Units 01/28/22  0536 01/27/22  0495 01/26/22  0532 01/25/22  0548 01/24/22  0530 01/23/22  0504 01/22/22  0804 01/21/22  1218 01/21/22  1218   WBC Thousand/uL  --  7 36 7 85 7 12 6 67 5 17 6 53  --  8 38   HEMOGLOBIN g/dL  --  8 0* 7 9* 7 4* 7 5* 7 8* 8 7*  --  10 0*   HEMATOCRIT %  --  24 7* 25 2* 23 5* 24 0* 25 7* 28 0*  --  32 2*   PLATELETS Thousands/uL  --  307 297 272 284 289 302  --  347   POTASSIUM mmol/L 4 3 4 3 4 5 4 7 4 6 4 6 4 5   < > 5 1   CHLORIDE mmol/L 100 102 103 102 102 104 104   < > 108   CO2 mmol/L 22 20* 19* 16* 17* 16* 17*   < > 19*   BUN mg/dL 89* 91* 85* 84* 76* 70* 70*   < > 63*   CREATININE mg/dL 8 07* 8 19* 7 92* 7 36* 6 93* 6 41* 5 79*   < > 5 79*   CALCIUM mg/dL 7 8* 7 7* 7 8* 7 5* 7 6* 7 7* 7 5*   < > 7 9*   MAGNESIUM mg/dL  --  1 9 1 9 1 8  --   --   --   --  2 1   PHOSPHORUS mg/dL  --  7 6* 7 4* 7 4*  --   --   --   --   --     < > = values in this interval not displayed

## 2022-01-28 NOTE — PHYSICAL THERAPY NOTE
Attempted PT treatment however pt having dialysis  Will follow    Delbra Eisenmenger PT  [id number]     01/28/22 0900   Note Type   Note Type Cancelled Session   Cancel Reasons Other

## 2022-01-28 NOTE — CASE MANAGEMENT
Case Management Discharge Planning Note    Patient name Michael Soto  [facility] MRN [id number]  : 1960 Date 2022       Current Admission Date: 2022  Current Admission Diagnosis:Acute kidney injury superimposed on chronic kidney disease [facility])   Patient Active Problem List    Diagnosis Date Noted    Anemia 2022    Acute kidney injury superimposed on chronic kidney disease ([facility] [address] ) 2022    Diabetes mellitus ([facility] )     Hypertension     TIA (transient ischemic attack)     T10 vertebral fracture ([facility])       LOS (days): 7  Geometric Mean LOS (GMLOS) (days): 3 10  Days to GMLOS:-4     OBJECTIVE:  Risk of Unplanned Readmission Score: 17     Current admission status: Inpatient   Preferred Pharmacy:   [address] [address] Dorene Niño  Phone: [phone] Fax: [phone]    Primary Care Provider: Amauri Roa MD    Primary Insurance: MEDICARE  Secondary Insurance:     DISCHARGE DETAILS:    Discharge planning discussed with[de-identified] Patient  Freedom of Choice: Yes  Comments - Freedom of Choice: SW following to assist with DCP  Pt started dialysis today, anticipating need for outpatient  SW met with pt to review plans and options for rehab and dialysis  With regard to dialysis pt is interested in being set up with dialysis clinic that his current nephrologists practice at  That would be [facility]  SW will focus on arrangements with [facility] clinic in [facility] while in rehab so transition to [facility] will be smoother when he returns home with sister  Rehab referrals have been made and are still pending  Offered ongoing support and assistance with planning  SW will follow  Other Referral/Resources/Interventions Provided:  Interventions: [address]  Term Rehab  Referral Comments: STR referrals pending  Referral made to [facility]      Treatment Team Recommendation: Short Term Rehab  Discharge Destination Plan[de-identified] Short Term Rehab  Transport at Discharge : Wheelchair Kirk Hickey

## 2022-01-28 NOTE — PLAN OF CARE
Post-Dialysis RN Treatment Note    Blood Pressure:  Pre 158/71 mm/Hg  Post 162/75 mmHg   EDW  TBD kg    Weight:  Pre 130 3 kg   Post 130 3 kg   Mode of weight measurement: Bed Scale   Volume Removed  0 ml    Treatment duration 120 minutes    NS given  No    Treatment shortened? No   Medications given during Rx None Reported   Estimated Kt/V  None Reported   Access type: Permacath/TDC   Access Issues: No    Report called to primary nurse   Yes Bravo Anguiano RN      Current hemodialysis plan of care is to remove a total of 500 ml of fluid over a 2 hour treatment to keep weight even  Monitor vital signs every 15 minutes while on treatment for patient safety as a new start to dialysis  Monitor cardiac status while on treatment as a new start to dialysis  Utilize a 3 K+ bath for serum potassium of 4 3 to maintain electrolyte balance  Report received from Bravo Anguiano RN  Plan reviewed with Dr Ely Sawant at bedside          Problem: METABOLIC, FLUID AND ELECTROLYTES - ADULT  Goal: Electrolytes maintained within normal limits  Description: INTERVENTIONS:  - Monitor labs and assess patient for signs and symptoms of electrolyte imbalances  - Administer electrolyte replacement as ordered  - Monitor response to electrolyte replacements, including repeat lab results as appropriate  - Instruct patient on fluid and nutrition as appropriate  Outcome: Progressing  Goal: Fluid balance maintained  Description: INTERVENTIONS:  - Monitor labs   - Monitor I/O and WT  - Instruct patient on fluid and nutrition as appropriate  - Assess for signs & symptoms of volume excess or deficit  Outcome: Progressing

## 2022-01-29 ENCOUNTER — APPOINTMENT (INPATIENT)
Dept: DIALYSIS | Facility: HOSPITAL | Age: 62
DRG: 674 | End: 2022-01-29
Payer: MEDICARE

## 2022-01-29 LAB
ANION GAP SERPL CALCULATED.3IONS-SCNC: 15 MMOL/L (ref 4–13)
BUN SERPL-MCNC: 67 MG/DL (ref 5–25)
CALCIUM SERPL-MCNC: 8 MG/DL (ref 8.3–10.1)
CHLORIDE SERPL-SCNC: 102 MMOL/L (ref 100–108)
CO2 SERPL-SCNC: 23 MMOL/L (ref 21–32)
CREAT SERPL-MCNC: 6.59 MG/DL (ref 0.6–1.3)
ERYTHROCYTE [DISTWIDTH] IN BLOOD BY AUTOMATED COUNT: 14.5 % (ref 11.6–15.1)
GFR SERPL CREATININE-BSD FRML MDRD: 8 ML/MIN/1.73SQ M
GLUCOSE SERPL-MCNC: 111 MG/DL (ref 65–140)
GLUCOSE SERPL-MCNC: 120 MG/DL (ref 65–140)
GLUCOSE SERPL-MCNC: 122 MG/DL (ref 65–140)
GLUCOSE SERPL-MCNC: 124 MG/DL (ref 65–140)
GLUCOSE SERPL-MCNC: 127 MG/DL (ref 65–140)
GLUCOSE SERPL-MCNC: 157 MG/DL (ref 65–140)
HCT VFR BLD AUTO: 24.4 % (ref 36.5–49.3)
HGB BLD-MCNC: 7.8 G/DL (ref 12–17)
MCH RBC QN AUTO: 29.4 PG (ref 26.8–34.3)
MCHC RBC AUTO-ENTMCNC: 32 G/DL (ref 31.4–37.4)
MCV RBC AUTO: 92 FL (ref 82–98)
PHOSPHATE SERPL-MCNC: 5.9 MG/DL (ref 2.3–4.1)
PLATELET # BLD AUTO: 280 THOUSANDS/UL (ref 149–390)
PMV BLD AUTO: 10 FL (ref 8.9–12.7)
POTASSIUM SERPL-SCNC: 4.1 MMOL/L (ref 3.5–5.3)
RBC # BLD AUTO: 2.65 MILLION/UL (ref 3.88–5.62)
SODIUM SERPL-SCNC: 140 MMOL/L (ref 136–145)
WBC # BLD AUTO: 7.35 THOUSAND/UL (ref 4.31–10.16)

## 2022-01-29 PROCEDURE — 80048 BASIC METABOLIC PNL TOTAL CA: CPT | Performed by: INTERNAL MEDICINE

## 2022-01-29 PROCEDURE — 99232 SBSQ HOSP IP/OBS MODERATE 35: CPT | Performed by: FAMILY MEDICINE

## 2022-01-29 PROCEDURE — 85027 COMPLETE CBC AUTOMATED: CPT | Performed by: INTERNAL MEDICINE

## 2022-01-29 PROCEDURE — 90935 HEMODIALYSIS ONE EVALUATION: CPT | Performed by: INTERNAL MEDICINE

## 2022-01-29 PROCEDURE — 84100 ASSAY OF PHOSPHORUS: CPT | Performed by: INTERNAL MEDICINE

## 2022-01-29 PROCEDURE — 82948 REAGENT STRIP/BLOOD GLUCOSE: CPT

## 2022-01-29 RX ADMIN — LIDOCAINE 5% 1 PATCH: 700 PATCH TOPICAL at 08:32

## 2022-01-29 RX ADMIN — CARVEDILOL 12.5 MG: 12.5 TABLET, FILM COATED ORAL at 08:31

## 2022-01-29 RX ADMIN — ATORVASTATIN CALCIUM 80 MG: 80 TABLET, FILM COATED ORAL at 08:31

## 2022-01-29 RX ADMIN — OXYCODONE HYDROCHLORIDE 5 MG: 5 TABLET ORAL at 14:10

## 2022-01-29 RX ADMIN — CARVEDILOL 12.5 MG: 12.5 TABLET, FILM COATED ORAL at 17:21

## 2022-01-29 RX ADMIN — SEVELAMER HYDROCHLORIDE 800 MG: 800 TABLET, FILM COATED PARENTERAL at 11:56

## 2022-01-29 RX ADMIN — OXYCODONE HYDROCHLORIDE 5 MG: 5 TABLET ORAL at 04:49

## 2022-01-29 RX ADMIN — NIFEDIPINE 30 MG: 30 TABLET, FILM COATED, EXTENDED RELEASE ORAL at 17:18

## 2022-01-29 RX ADMIN — HYDROMORPHONE HYDROCHLORIDE 0.5 MG: 1 INJECTION, SOLUTION INTRAMUSCULAR; INTRAVENOUS; SUBCUTANEOUS at 09:23

## 2022-01-29 RX ADMIN — ACETAMINOPHEN 650 MG: 325 TABLET, FILM COATED ORAL at 06:13

## 2022-01-29 RX ADMIN — ACETAMINOPHEN 650 MG: 325 TABLET, FILM COATED ORAL at 11:56

## 2022-01-29 RX ADMIN — ACETAMINOPHEN 650 MG: 325 TABLET, FILM COATED ORAL at 17:18

## 2022-01-29 RX ADMIN — FUROSEMIDE 80 MG: 10 INJECTION, SOLUTION INTRAMUSCULAR; INTRAVENOUS at 00:15

## 2022-01-29 RX ADMIN — SEVELAMER HYDROCHLORIDE 800 MG: 800 TABLET, FILM COATED PARENTERAL at 08:31

## 2022-01-29 RX ADMIN — ALLOPURINOL 100 MG: 100 TABLET ORAL at 08:32

## 2022-01-29 RX ADMIN — OXYCODONE HYDROCHLORIDE 5 MG: 5 TABLET ORAL at 21:31

## 2022-01-29 RX ADMIN — INSULIN LISPRO 1 UNITS: 100 INJECTION, SOLUTION INTRAVENOUS; SUBCUTANEOUS at 21:32

## 2022-01-29 RX ADMIN — SEVELAMER HYDROCHLORIDE 800 MG: 800 TABLET, FILM COATED PARENTERAL at 16:04

## 2022-01-29 RX ADMIN — FUROSEMIDE 80 MG: 10 INJECTION, SOLUTION INTRAMUSCULAR; INTRAVENOUS at 08:32

## 2022-01-29 RX ADMIN — FUROSEMIDE 80 MG: 10 INJECTION, SOLUTION INTRAMUSCULAR; INTRAVENOUS at 16:04

## 2022-01-29 NOTE — OCCUPATIONAL THERAPY NOTE
Occupational Therapy Cancel Note       01/29/22 1400   Note Type   Note Type Cancelled Session   Cancel Reasons Other  (Patient getting HD at bedside)   [address] Mccarthy Number  Beatriz Santiago, OTR/L [id number]

## 2022-01-29 NOTE — PLAN OF CARE
Post-Dialysis RN Treatment Note    Blood Pressure:  Pre 145/65 mm/Hg  Post 147/77 mmHg   EDW TBD kg    Weight:  Pre 130 kg   Post 129 kg   Mode of weight measurement: Bed Scale   Volume Removed  1000 ml    Treatment duration 150 minutes    NS given  No    Treatment shortened?  No   Medications given during Rx Not Applicable   Estimated Kt/V  Not Applicable   Access type: Permacath/TDC   Access Issues: No    Report called to primary nurse   Yes / Dayana Mckeon RN        1000 ml as tolerated, 2 hrs, 3K  Problem: METABOLIC, FLUID AND ELECTROLYTES - ADULT  Goal: Electrolytes maintained within normal limits  Description: INTERVENTIONS:  - Monitor labs and assess patient for signs and symptoms of electrolyte imbalances  - Administer electrolyte replacement as ordered  - Monitor response to electrolyte replacements, including repeat lab results as appropriate  - Instruct patient on fluid and nutrition as appropriate  Outcome: Progressing  Goal: Fluid balance maintained  Description: INTERVENTIONS:  - Monitor labs   - Monitor I/O and WT  - Instruct patient on fluid and nutrition as appropriate  - Assess for signs & symptoms of volume excess or deficit  Outcome: Progressing

## 2022-01-29 NOTE — PLAN OF CARE
Problem: Prexisting or High Potential for Compromised Skin Integrity  Goal: Skin integrity is maintained or improved  Description: INTERVENTIONS:  - Identify patients at risk for skin breakdown  - Assess and monitor skin integrity  - Assess and monitor nutrition and hydration status  - Monitor labs   - Assess for incontinence   - Assist with mobility/ambulation  - Relieve pressure over bony prominences  - Avoid friction and shearing  - Provide appropriate hygiene as needed including keeping skin clean and dry  - Evaluate need for skin moisturizer/barrier cream  - Collaborate with interdisciplinary team   - Patient/family teaching  - Consider wound care consult   Outcome: Progressing     Problem: MOBILITY - ADULT  Goal: Maintain or return to baseline ADL function  Description: INTERVENTIONS:  -  Assess patient's ability to carry out ADLs; assess patient's baseline for ADL function and identify physical deficits which impact ability to perform ADLs (bathing, care of mouth/teeth, toileting, grooming, dressing, etc )  - Assess/evaluate cause of self-care deficits   - Assess range of motion  - Assess patient's mobility; develop plan if impaired  - Assess patient's need for assistive devices and provide as appropriate  - Encourage maximum independence but intervene and supervise when necessary  - Involve family in performance of ADLs  - Assess for home care needs following discharge   - Consider OT consult to assist with ADL evaluation and planning for discharge  - Provide patient education as appropriate  Outcome: Progressing     Problem: Potential for Falls  Goal: Patient will remain free of falls  Description: INTERVENTIONS:  - Educate patient/family on patient safety including physical limitations  - Instruct patient to call for assistance with activity   - Consult OT/PT to assist with strengthening/mobility   - Keep Call bell within reach  - Keep bed low and locked with side rails adjusted as appropriate  - Keep care items and personal belongings within reach  - Initiate and maintain comfort rounds  - Make Fall Risk Sign visible to staff  - Offer Toileting every 2 Hours, in advance of need  - Initiate/Maintain bed/ chair alarm  - Apply yellow socks and bracelet for high fall risk patients  - Consider moving patient to room near nurses station  Outcome: Progressing     Problem: METABOLIC, FLUID AND ELECTROLYTES - ADULT  Goal: Electrolytes maintained within normal limits  Description: INTERVENTIONS:  - Monitor labs and assess patient for signs and symptoms of electrolyte imbalances  - Administer electrolyte replacement as ordered  - Monitor response to electrolyte replacements, including repeat lab results as appropriate  - Instruct patient on fluid and nutrition as appropriate  Outcome: Progressing  Goal: Fluid balance maintained  Description: INTERVENTIONS:  - Monitor labs   - Monitor I/O and WT  - Instruct patient on fluid and nutrition as appropriate  - Assess for signs & symptoms of volume excess or deficit  Outcome: Progressing     Problem: Nutrition/Hydration-ADULT  Goal: Nutrient/Hydration intake appropriate for improving, restoring or maintaining nutritional needs  Description: Monitor and assess patient's nutrition/hydration status for malnutrition  Collaborate with interdisciplinary team and initiate plan and interventions as ordered  Monitor patient's weight and dietary intake as ordered or per policy  Utilize nutrition screening tool and intervene as necessary  Determine patient's food preferences and provide high-protein, high-caloric foods as appropriate       INTERVENTIONS:  - Monitor oral intake, urinary output, labs, and treatment plans  - Assess nutrition and hydration status and recommend course of action  - Evaluate amount of meals eaten  - Assist patient with eating if necessary   - Allow adequate time for meals  - Recommend/ encourage appropriate diets, oral nutritional supplements, and vitamin/mineral supplements  - Order, calculate, and assess calorie counts as needed  - Recommend, monitor, and adjust tube feedings and TPN/PPN based on assessed needs  - Assess need for intravenous fluids  - Provide specific nutrition/hydration education as appropriate  - Include patient/family/caregiver in decisions related to nutrition  Outcome: Progressing

## 2022-01-29 NOTE — PROGRESS NOTES
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, TIA, diastolic CHF, who initially presents end of prior week with back pain   After a fall   Nephrology is on board for acute kidney injury   Patient was also initially noted to be hypertensive       1) DEVIN on CKD IV-V     - baseline creatinine 4 3-4 7 mg/dL (but to note, creatinine in February 2020 was 1 9, rising to 3 3 in July 2021, and rising further to 4 5-4 7 in November 2021)  -during prior admission in November, creatinine stabilized to 4 7 at [facility] and chlorthalidone were held  Juan Coombs was planned but could not be completed due to body habitus   -admission creatinine 5 8 mg/dL  -patient was initially started on furosemide IV with albumin  -urinalysis with 0-1 RBC, 0-1 WBC, 2+ protein  -urine protein creatinine ratio 10 7 g  -CT scan-kidneys without hydronephrosis, renal hypodensities too small to characterize likely benign   Minimally displaced oblique fracture involving T10   -creatinine rising 6 4 mg/dL on January 23rd  -SPEP, UPEP-unrevealing prior  -chronic hepatitis panel-negative prior  -C3, C4 was not low prior  -NURIA negative  -ANCA-negative  -anti phospholipase A2 antibody receptor-  -anti GBM-three  -A1c is 6 6%     Etiology of acute kidney injury is unclear   May need to consider membranous versus minimal change versus other   Does not appear nephritic   A1c is well controlled   To note, patient was using NSAIDs 3 tablets 3 to 4 times a day for 1 week prior to admission   Therefore there may be a component of NSAID induced nephropathy also     1/24-creatinine continues to rise to 6 9 mg/dL   Patient has nephrotic range proteinuria   No hematuria   No hydronephrosis   Was on furosemide 3 times a day     -1/25-creatinine continues to rise is 7 4 mg/dL   Lower furosemide slightly     -1/26-creatinine rising 7 9 mg/dL   Continue furosemide twice a day and albumin twice a day   8 2 mg/dL     -January 27-creatinine continues to rise   Dialysis catheter placed by IR with tunnel catheter  - consent for dialysis obtained 1/27 and placed paper consent to be scanned     -January 28-starting dialysis treatment 1      -January 29-dialysis treatment 2  For 2 5 hours, 1 L ultrafiltration      Plan     -patient was seen and examined on dialysis  Prescription 2 5 hours, 250 blood flow, 1 L ultrafiltration  - on Monday, will evaluate for dialysis (for now will place on afternoon shift on Monday to allow for evaluation for renal recovery and potential holding of dialysis on Monday)  Therefore I have not placed Monday's dialysis orders for now  Updated dialysis  - continue Lasix 3 times a day for now  - ultrafiltration today 1 L  -continue phosphorus binders  -hold bicarbonate tablets  -follow-up MEGHANA 2R antibody  -renal biopsy potentially on Monday-I have messaged IR team to discuss as the patient states he may not feel ready for biopsy Monday   eval Monday if pt agreeable to biopsy or not and then message IR team if pt agreeable    -case management for outpatient dialysis placement  Continue to hold Plavix for now due to potential biopsy on Monday  -check stool occult  -  radiology team is on board for biopsy potentially next Monday  -biopsy form sent 1/25 to IR  -may need blood transfusion prior to biopsy if hemoglobin remains less than 8  -avoid all NSAIDs as doing  - will need DDAVP prior to renal biopsy 30 minutes before -20 mcg IV 1 time  - will need to be NPO Sunday night into Monday  - AM heparin sq will need to be held Monday AM for biopsy (1/31) also     2) back pain     -patient had fall at home 1 week prior to admission  -per primary team  -pain control per primary team     3) hypertension-     -on nifedipine and carvedilol     4) acid/base-     -bicarbonate lower at 16 and started on bicarbonate tablets and serum bicarbonate 17 on 01/24  -on bicarbonate tablets-holding starting January 28th     5) anemia-     -hemoglobin low at 7 4  -iron panel-iron saturation 20%  -received Venofer prior admission in November  -history of TIA, will avoid SURINDER for now but eventually may need to consider and we discussed with the patient  -decreasing hemoglobin is likely a factor of CKD but initial hemoglobin was 10 on 01/21 and now has decreased to 7 5   Unclear accuracy of 10  -transfusion per primary team  -iron panel with iron saturation 21%     6) T10 vertebral fracture-per primary team     7) electrolytes-sodium is stable, potassium is stable       SUBJECTIVE / 24H INTERVAL HISTORY:    Blood pressure is 925 systolic  Patient without shortness of breath  Still with edema lower extremities  Afebrile  On room air  Urine output 1 3 L yesterday      OBJECTIVE:  Current Weight: Weight - Scale: 130 kg (286 lb 9 6 oz)  Vitals:    01/29/22 0829 01/29/22 1320 01/29/22 1330 01/29/22 1400   BP: 110/53 145/65 143/65 142/68   BP Location: Left arm      Pulse: 67 68 65 66   Resp: 18 16 14 14   Temp: 98 2 °F (36 8 °C) 97 7 °F (36 5 °C)     TempSrc: Oral Tympanic     SpO2: 94%      Weight:       Height:           Intake/Output Summary (Last 24 hours) at 1/29/2022 1424  Last data filed at 1/29/2022 1300  Gross per 24 hour   Intake 530 ml   Output 1100 ml   Net -570 ml     General: NAD  Skin: no rash  Eyes: anicteric sclera  ENT: moist mucous membrane  Neck: supple  Chest: CTA b/l, no ronchii, no wheeze, no rubs, no rales  CVS: s1s2, no murmur, no gallop, no rub  Abdomen: soft, nontender, nl sounds, distended  Extremities:  Two to 3+ edema LE b/l  : no gaitan  Neuro: AAOX3  Psych: normal affect    Medications:    Current Facility-Administered Medications:     acetaminophen (TYLENOL) tablet 650 mg, 650 mg, Oral, Q6H [address], Lore Revankar, DO, 650 mg at 01/29/22 1156    allopurinol (ZYLOPRIM) tablet 100 mg, 100 mg, Oral, Daily, Grisel Shi MD, 100 mg at 01/29/22 0832    atorvastatin (LIPITOR) tablet 80 mg, 80 mg, Oral, Daily, Grisel Shi MD, 80 mg at 01/29/22 0831    carvedilol (COREG) tablet 12 5 mg, 12 5 mg, Oral, BID, Grisel Shi MD, 12 5 mg at 01/29/22 0831    furosemide (LASIX) injection 80 mg, 80 mg, Intravenous, Q8H, Brittaney Hernández MD, 80 mg at 01/29/22 0832    HYDROmorphone (DILAUDID) injection 0 5 mg, 0 5 mg, Intravenous, Q4H PRN, Lore Revankar, DO, 0 5 mg at 01/29/22 0923    HYDROmorphone (DILAUDID) injection 1 mg, 1 mg, Intravenous, Once, Grisel Shi MD    insulin lispro (HumaLOG) 100 units/mL subcutaneous injection 1-5 Units, 1-5 Units, Subcutaneous, HS, Joel Dalton MD    insulin lispro (HumaLOG) 100 units/mL subcutaneous injection 2-12 Units, 2-12 Units, Subcutaneous, TID AC, 2 Units at 01/27/22 1723 **AND** Fingerstick Glucose (POCT), , , TID AC, [facility] Rashawn Dalton MD    lidocaine (LIDODERM) 5 % patch 1 patch, 1 patch, Topical, Daily, Grisel Shi MD, 1 patch at 01/29/22 0832    NIFEdipine (PROCARDIA XL) 24 hr tablet 30 mg, 30 mg, Oral, BID, Lukasz Wilde MD, 30 mg at 01/28/22 1738    ondansetron (ZOFRAN) injection 4 mg, 4 mg, Intravenous, Q6H PRN, Grisel Shi MD    oxyCODONE (ROXICODONE) IR tablet 2 5 mg, 2 5 mg, Oral, Q6H PRN, Grisel Shi MD    oxyCODONE (ROXICODONE) IR tablet 5 mg, 5 mg, Oral, Q6H PRN, Lore Silver DO, 5 mg at 01/29/22 1410    sevelamer (RENAGEL) tablet 800 mg, 800 mg, Oral, TID With Meals, Brittaney Hernández MD, 800 mg at 01/29/22 1156    Laboratory Results:  Results from last 7 days   Lab Units 01/29/22  0539 01/28/22  0536 01/27/22  0455 01/26/22  0532 01/25/22  0548 01/24/22  0530 01/23/22  0504   WBC Thousand/uL 7 35  --  7 36 7 85 7 12 6 67 5 17   HEMOGLOBIN g/dL 7 8*  --  8 0* 7 9* 7 4* 7 5* 7 8*   HEMATOCRIT % 24 4*  --  24 7* 25 2* 23 5* 24 0* 25 7*   PLATELETS Thousands/uL 280  --  307 297 272 284 289   POTASSIUM mmol/L 4 1 4 3 4 3 4 5 4 7 4 6 4 6   CHLORIDE mmol/L 102 100 102 103 102 102 104   CO2 mmol/L 23 22 20* 19* 16* 17* 16*   BUN mg/dL 67* 89* 91* 85* 84* 76* 70*   CREATININE mg/dL 6 59* 8 07* 8 19* 7 92* 7 36* 6 93* 6 41*   CALCIUM mg/dL 8 0* 7 8* 7 7* 7 8* 7 5* 7 6* 7 7*   MAGNESIUM mg/dL  --   --  1 9 1 9 1 8  --   --    PHOSPHORUS mg/dL 5 9*  --  7 6* 7 4* 7 4*  --   --

## 2022-01-29 NOTE — PROGRESS NOTES
[address] Internal Medicine Progress Note  Patient: Govind Lee 57 y o  male   MRN: [id number]  PCP: Dionne Pabon MD  Unit/Bed#: [address] [place] [address] Encounter: [id number]  Date Of Visit: 01/29/22    Problem List:    Principal Problem:    Acute kidney injury superimposed on chronic kidney disease ([facility] [address] )  Active Problems:    T10 vertebral fracture ([facility] [address] )    Diabetes mellitus ([facility] )    Hypertension    TIA (transient ischemic attack)    Anemia      Assessment & Plan:    * Acute kidney injury superimposed on chronic kidney disease [facility])  Assessment & Plan  Lab Results   Component Value Date    EGFR 8 01/29/2022    EGFR 6 01/28/2022    EGFR 6 01/27/2022    CREATININE 6 59 (H) 01/29/2022    CREATININE 8 07 (H) 01/28/2022    CREATININE 8 19 (H) 01/27/2022     Patient has history of CKD stage IV  Baseline creatinine around 4 3-4 7  Admission creatinine was 5 79  Patient also noted to have significant anasarca with minimally elevated potassium at 5 1  Etiology suspected to be acute kidney injury related to possible cardiorenal syndrome, use of NSAIDs or progression of CKD  Patient's previous SPEP/UPEP were negative and chronic hepatitis panel was negative  C3-C4 not low and NURIA was negative  GBM antibodies and ANCA panel negative  CT abdomen/pelvis 1/23 showed no hydronephrosis or urinary tract calculus  UA showed trace amount of blood and protein  Per nephrology, discontinued Actos as it can cause fluid retention  After consult with IR and Nephrology, tentatively scheduled for kidney biopsy 1/31; continue to hold Plavix; hold heparin 6 hours prior to procedure  Nephrology suggesting DDAVP 20 micrograms IV x1 30 minutes prior to biopsy  Tunneled dialysis catheter placed 1/27; monitor site for drainage/oozing  Lasix increased to 80mg IV TID  IV albumin 25g Q12h d/c'ed  Patient received hemodialysis 1/28 and again today   Case management for outpatient dialysis placement  Sodium bicarb tablets discontinued 1/28    T10 vertebral fracture [facility])  Assessment & Plan  Patient reported severe low back pain x1 week prior to admission after slipping on a rug  Denies any bowel or bladder incontinence, tingling or numbness in the legs  Patient received fentanyl and 2 doses of Dilaudid in the ED with persistent pain  Lumbar x-ray showed no fractures or malalignment with possible ankylosing spondylitis  CT abdomen/pelvis 1/23 showed minimally displaced oblique fracture of the T10 vertebral body without extending into the posterior elements without any real bony retropulsion  Thoracic spine x-ray 1/24 does not visualize fracture well  Dr Saniya Isidro discussed with Neurosurgery Dr Kamille Troy - recommending TSLO brace and outpatient follow-up with neurosurgery  Continue acetaminophen 650 mg Q6h and Lidoderm 5% patch daily for pain management   Continue oxycodone 5mg along with 2 mg Q6h PRN and Dilaudid 0 5mg Q4h PRN breakthrough pain    Diabetes mellitus [facility])  Assessment & Plan  Lab Results   Component Value Date    HGBA1C 6 6 (H) 01/24/2022       Recent Labs     01/28/22  2153 01/29/22  0616 01/29/22  0745 01/29/22  1141   POCGLU 147* 122 111 127     Holding home linagliptin and Actos  Continue Humalog sliding scale with Accu-Cheks q a c  and HS  Blood sugars remain stable  Anemia  Assessment & Plan  Results from last 7 days   Lab Units 01/29/22  0539 01/27/22  0455 01/26/22  0532 01/25/22  0548 01/24/22  0530 01/23/22  0504   HEMOGLOBIN g/dL 7 8* 8 0* 7 9* 7 4* 7 5* 7 8*   HEMATOCRIT % 24 4* 24 7* 25 2* 23 5* 24 0* 25 7*   MCV fL 92 93 93 93 94 96     Patient's hemoglobin dropped from 10-7 8  MCV normal   Baseline hemoglobin appears to be 8-8 5  Admission hemoglobin level of 10 questionable for accuracy  No evidence of active bleeding    Iron panel showed iron level of 34, TIBC of 163 and ferritin level of 61 suggesting iron deficiency-pending stool occult blood  Transfuse PRBC if hemoglobin drops < 7  Given his history of TIA to avoid SURINDER for now  TIA (transient ischemic attack)  Assessment & Plan  Continue home statin  Holding Plavix in preparation for kidney biopsy     Hypertension  Assessment & Plan  Continue home nifedipine 30 mg PO BID and Coreg 12 5 mg PO BID with holding parameters  Diarrhea-resolved as of 2022  Assessment & Plan  Patient complained of some diarrhea after admission  Diarrhea has since resolved  Stool for bacterial panel and C diff toxin were negative  CT scan of the abdomen pelvis showed no acute pathology  Abdominal exam continues to be benign            VTE Pharmacologic Prophylaxis:   Heparin on hold due to dialysis catheter placement  Will restart tomorrow    Patient Centered Rounds: I performed bedside rounds with nursing staff today  Discussions with Specialists or Other Care Team Provider: yes    Education and Discussions with Family / Patient: yes    Time Spent for Care: 35 min  More than 50% of total time spent on counseling and coordination of care as described above  Current Length of Stay: 8 day(s)  Current Patient Status: Inpatient   Certification Statement: The patient will continue to require additional inpatient hospital stay due to [place] on CKD requiring dialysis and kidney biopsy  Discharge Plan: Anticipate discharge in >72 hrs to rehab facility  Code Status: Level 1 - Full Code    Subjective:     States back pain is manageable with current pain regimen  Denies any shortness of breath    Objective:     Vitals:   Temp (24hrs), Av °F (36 7 °C), Min:97 7 °F (36 5 °C), Max:98 2 °F (36 8 °C)    Temp:  [97 7 °F (36 5 °C)-98 2 °F (36 8 °C)] 97 7 °F (36 5 °C)  HR:  [65-72] 65  Resp:  [14-18] 14  BP: (110-164)/(53-78) 143/65  SpO2:  [94 %-96 %] 94 %  Body mass index is 37 81 kg/m²  Input and Output Summary (last 24 hours):      Intake/Output Summary (Last 24 hours) at 2022 1345  Last data filed at 2022 1300  Gross per 24 hour   Intake 660 ml   Output 1800 ml   Net -1140 ml Physical Exam:   Physical Exam  Constitutional:       General: He is not in acute distress  Appearance: He is not diaphoretic  HENT:      Head: Normocephalic and atraumatic  Eyes:      General:         Right eye: No discharge  Left eye: No discharge  Cardiovascular:      Rate and Rhythm: Normal rate and regular rhythm  Pulmonary:      Effort: Pulmonary effort is normal  No respiratory distress  Breath sounds: Normal breath sounds  No wheezing or rales  Abdominal:      General: Bowel sounds are normal  There is no distension  Palpations: Abdomen is soft  Tenderness: There is no abdominal tenderness  Musculoskeletal:      Right lower leg: Edema present  Left lower leg: Edema present  Skin:     Comments: Right side of the chest - tunneled dialysis catheter in place with some old blood noted on the dressing   Neurological:      Mental Status: He is alert and oriented to person, place, and time  Additional Data:     Labs:  Results from last 7 days   Lab Units 01/29/22  0539 01/24/22  0530 01/23/22  0504   WBC Thousand/uL 7 35   < > 5 17   HEMOGLOBIN g/dL 7 8*   < > 7 8*   HEMATOCRIT % 24 4*   < > 25 7*   PLATELETS Thousands/uL 280   < > 289   NEUTROS PCT %  --   --  57   LYMPHS PCT %  --   --  22   MONOS PCT %  --   --  16*   EOS PCT %  --   --  3    < > = values in this interval not displayed       Results from last 7 days   Lab Units 01/29/22  0539   SODIUM mmol/L 140   POTASSIUM mmol/L 4 1   CHLORIDE mmol/L 102   CO2 mmol/L 23   BUN mg/dL 67*   CREATININE mg/dL 6 59*   ANION GAP mmol/L 15*   CALCIUM mg/dL 8 0*   GLUCOSE RANDOM mg/dL 120     Results from last 7 days   Lab Units 01/25/22  0548   INR  1 02     Results from last 7 days   Lab Units 01/29/22  1141 01/29/22  0745 01/29/22  0616 01/28/22  2153 01/28/22  1549 01/28/22  1156 01/28/22  0626 01/27/22  2033 01/27/22  1619 01/27/22  1110 01/27/22  0707 01/26/22  2132   POC GLUCOSE mg/dl 127 111 122 147* 143* 115 108 82 172* 114 104 138     Results from last 7 days   Lab Units 01/24/22  0530   HEMOGLOBIN A1C % 6 6*           Lines/Drains:  Invasive Devices  Report    Peripheral Intravenous Line            Peripheral IV 01/24/22 Dorsal (posterior); Right Hand 5 days          Hemodialysis Catheter            HD Permanent Double Catheter 1 day                      Imaging: No pertinent imaging reviewed  Recent Cultures (last 7 days):   Results from last 7 days   Lab Units 01/22/22  1831   C DIFF TOXIN B BY PCR  Negative       Last 24 Hours Medication List:   Current Facility-Administered Medications   Medication Dose Route Frequency Provider Last Rate    acetaminophen  650 mg Oral Q6H [address] Lore Silver DO      allopurinol  100 mg Oral Daily Cj Sandoval MD      atorvastatin  80 mg Oral Daily Cj Sandoval MD      carvedilol  12 5 mg Oral BID Cj Sandoval MD      furosemide  80 mg Intravenous Q8H Elieser Mark MD      HYDROmorphone  0 5 mg Intravenous Q4H PRN Lore Silver DO      HYDROmorphone  1 mg Intravenous Once Cj Sandoval MD      insulin lispro  1-5 Units Subcutaneous HS Cj Sandoval MD      insulin lispro  2-12 Units Subcutaneous TID AC Joel Dalton MD      lidocaine  1 patch Topical Daily Cj Sandoval MD      NIFEdipine  30 mg Oral BID Elsa Angulo MD      ondansetron  4 mg Intravenous Q6H PRN Cj Sandoval MD      oxyCODONE  2 5 mg Oral Q6H PRN Cj Sandoval MD      oxyCODONE  5 mg Oral Q6H PRN Lore Silver DO      sevelamer  800 mg Oral TID With Meals Elieser Mark MD          Today, Patient Was Seen By: Gwyn Aaron DO    ** Please Note: "This note has been constructed using a voice recognition system  Therefore there may be syntax, spelling, and/or grammatical errors   Please call if you have any questions  "**

## 2022-01-30 LAB
ANION GAP SERPL CALCULATED.3IONS-SCNC: 11 MMOL/L (ref 4–13)
BUN SERPL-MCNC: 51 MG/DL (ref 5–25)
CALCIUM SERPL-MCNC: 8.1 MG/DL (ref 8.3–10.1)
CHLORIDE SERPL-SCNC: 101 MMOL/L (ref 100–108)
CO2 SERPL-SCNC: 25 MMOL/L (ref 21–32)
CREAT SERPL-MCNC: 5.57 MG/DL (ref 0.6–1.3)
GFR SERPL CREATININE-BSD FRML MDRD: 10 ML/MIN/1.73SQ M
GLUCOSE SERPL-MCNC: 120 MG/DL (ref 65–140)
GLUCOSE SERPL-MCNC: 130 MG/DL (ref 65–140)
GLUCOSE SERPL-MCNC: 134 MG/DL (ref 65–140)
GLUCOSE SERPL-MCNC: 137 MG/DL (ref 65–140)
GLUCOSE SERPL-MCNC: 162 MG/DL (ref 65–140)
INR PPP: 1.02 (ref 0.84–1.19)
POTASSIUM SERPL-SCNC: 4 MMOL/L (ref 3.5–5.3)
PROTHROMBIN TIME: 13.2 SECONDS (ref 11.6–14.5)
SODIUM SERPL-SCNC: 137 MMOL/L (ref 136–145)

## 2022-01-30 PROCEDURE — 99232 SBSQ HOSP IP/OBS MODERATE 35: CPT | Performed by: FAMILY MEDICINE

## 2022-01-30 PROCEDURE — 87081 CULTURE SCREEN ONLY: CPT | Performed by: FAMILY MEDICINE

## 2022-01-30 PROCEDURE — 80048 BASIC METABOLIC PNL TOTAL CA: CPT | Performed by: INTERNAL MEDICINE

## 2022-01-30 PROCEDURE — 99232 SBSQ HOSP IP/OBS MODERATE 35: CPT | Performed by: INTERNAL MEDICINE

## 2022-01-30 PROCEDURE — 85610 PROTHROMBIN TIME: CPT | Performed by: INTERNAL MEDICINE

## 2022-01-30 PROCEDURE — 82948 REAGENT STRIP/BLOOD GLUCOSE: CPT

## 2022-01-30 RX ADMIN — FUROSEMIDE 80 MG: 10 INJECTION, SOLUTION INTRAMUSCULAR; INTRAVENOUS at 08:21

## 2022-01-30 RX ADMIN — ACETAMINOPHEN 650 MG: 325 TABLET, FILM COATED ORAL at 05:18

## 2022-01-30 RX ADMIN — NIFEDIPINE 30 MG: 30 TABLET, FILM COATED, EXTENDED RELEASE ORAL at 17:08

## 2022-01-30 RX ADMIN — SEVELAMER HYDROCHLORIDE 800 MG: 800 TABLET, FILM COATED PARENTERAL at 17:08

## 2022-01-30 RX ADMIN — ACETAMINOPHEN 650 MG: 325 TABLET, FILM COATED ORAL at 12:17

## 2022-01-30 RX ADMIN — SEVELAMER HYDROCHLORIDE 800 MG: 800 TABLET, FILM COATED PARENTERAL at 13:01

## 2022-01-30 RX ADMIN — SEVELAMER HYDROCHLORIDE 800 MG: 800 TABLET, FILM COATED PARENTERAL at 08:21

## 2022-01-30 RX ADMIN — FUROSEMIDE 80 MG: 10 INJECTION, SOLUTION INTRAMUSCULAR; INTRAVENOUS at 17:08

## 2022-01-30 RX ADMIN — CARVEDILOL 12.5 MG: 12.5 TABLET, FILM COATED ORAL at 08:20

## 2022-01-30 RX ADMIN — NIFEDIPINE 30 MG: 30 TABLET, FILM COATED, EXTENDED RELEASE ORAL at 08:20

## 2022-01-30 RX ADMIN — CARVEDILOL 12.5 MG: 12.5 TABLET, FILM COATED ORAL at 17:08

## 2022-01-30 RX ADMIN — OXYCODONE HYDROCHLORIDE 5 MG: 5 TABLET ORAL at 04:34

## 2022-01-30 RX ADMIN — INSULIN LISPRO 2 UNITS: 100 INJECTION, SOLUTION INTRAVENOUS; SUBCUTANEOUS at 17:06

## 2022-01-30 RX ADMIN — ATORVASTATIN CALCIUM 80 MG: 80 TABLET, FILM COATED ORAL at 08:20

## 2022-01-30 RX ADMIN — OXYCODONE HYDROCHLORIDE 5 MG: 5 TABLET ORAL at 13:00

## 2022-01-30 RX ADMIN — OXYCODONE HYDROCHLORIDE 5 MG: 5 TABLET ORAL at 19:27

## 2022-01-30 RX ADMIN — ACETAMINOPHEN 650 MG: 325 TABLET, FILM COATED ORAL at 17:07

## 2022-01-30 RX ADMIN — HYDROMORPHONE HYDROCHLORIDE 0.5 MG: 1 INJECTION, SOLUTION INTRAMUSCULAR; INTRAVENOUS; SUBCUTANEOUS at 08:21

## 2022-01-30 RX ADMIN — ACETAMINOPHEN 650 MG: 325 TABLET, FILM COATED ORAL at 00:28

## 2022-01-30 RX ADMIN — FUROSEMIDE 80 MG: 10 INJECTION, SOLUTION INTRAMUSCULAR; INTRAVENOUS at 00:29

## 2022-01-30 RX ADMIN — ALLOPURINOL 100 MG: 100 TABLET ORAL at 08:20

## 2022-01-30 NOTE — PROGRESS NOTES
[address] [place] [address]   Xochitl Storey 57 y o  male MRN: [id number]  Unit/Bed#: 81443 Marilyn Kimball Encounter: [id number]  Reason for Consult:  Acute on chronic kidney injury currently requiring dialysis  ASSESSMENT and PLAN:    Alicia Cai - 77-year-old male with a past medical history of CKD, diabetes, hypertension, prior left 5th toe amputation, TIA, diastolic CHF, who initially presents end of prior week with back pain   After a fall   Nephrology is on board for acute kidney injury   Patient was also initially noted to be hypertensive       1) DEVIN on CKD IV-V     - baseline creatinine 4 3-4 7 mg/dL (but to note, creatinine in February 2020 was 1 9, rising to 3 3 in July 2021, and rising further to 4 5-4 7 in November 2021)  -during prior admission in November, creatinine stabilized to 4 7 at [facility] and chlorthalidone were held  [place]  was planned but could not be completed due to body habitus   -admission creatinine 5 8 mg/dL  -patient was initially started on furosemide IV with albumin  -urinalysis with 0-1 RBC, 0-1 WBC, 2+ protein  -urine protein creatinine ratio 10 7 g  -CT scan-kidneys without hydronephrosis, renal hypodensities too small to characterize likely benign   Minimally displaced oblique fracture involving T10   -creatinine rising 6 4 mg/dL on January 23rd  -SPEP, UPEP-unrevealing prior  -chronic hepatitis panel-negative prior  -C3, C4 was not low prior  -NURIA negative  -ANCA-negative  -anti phospholipase A2 antibody receptor-  -anti GBM-three  -A1c is 6 6%     Etiology of acute kidney injury is unclear   May need to consider membranous versus minimal change versus other   Does not appear nephritic   A1c is well controlled   To note, patient was using NSAIDs 3 tablets 3 to 4 times a day for 1 week prior to admission   Therefore there may be a component of NSAID induced nephropathy also     1/24-creatinine continues to rise to 6 9 mg/dL   Patient has nephrotic range proteinuria   No hematuria   No hydronephrosis   Was on furosemide 3 times a day     -1/25-creatinine continues to rise is 7 4 mg/dL   Lower furosemide slightly     -1/26-creatinine rising 7 9 mg/dL   Continue furosemide twice a day and albumin twice a day   8 2 mg/dL     -January 27-creatinine continues to rise   Dialysis catheter placed by IR with tunnel catheter  - consent for dialysis obtained 1/27 and placed paper consent to be scanned     -January 28-starting dialysis treatment 1       -January 29-dialysis treatment 2  For 2 5 hours, 1 L ultrafiltration  Continued on Lasix  Urine output improving    -January 30th-no urgent dialysis indicated  Continued on Lasix  Lab work pending  1st day that the volume state appears improved overall     Plan    -BMP-ordered, INR ordered  Pending  -evaluate for dialysis for treatment 3 on Monday  If showing signs of renal recovery, may hold dialysis and monitor until Tuesday  -access is tunneled dialysis catheter  -renal biopsy-on Monday, patient needs to be evaluated 1st before sending to IR  Need to ensure that patient can lie flat and also that the patient is agreeable    I have messaged IR team today and have also discussed with IR team before regarding this  - continue Lasix 3 times a day for now  -continue phosphorus binders  -hold bicarbonate tablets  -follow-up MEGHANA 2R antibody  -case management for outpatient dialysis placement  Continue to hold Plavix for now due to potential biopsy on Monday  -check stool occult  -  radiology team is on board for biopsy potentially Monday  -biopsy form sent 1/25 to IR  -may need blood transfusion prior to biopsy if hemoglobin remains less than 8  -avoid all NSAIDs as doing  - will need DDAVP prior to renal biopsy 30 minutes before -20 mcg IV 1 time  - will need to be NPO Sunday night into Monday  - AM heparin sq will need to be held Monday AM for biopsy (1/31) also     2) back pain     -patient had fall at home 1 week prior to admission  -per primary team  -pain control per primary team     3) hypertension-     -on nifedipine and carvedilol     4) acid/base-     -bicarbonate lower at 16 and started on bicarbonate tablets and serum bicarbonate 17 on 01/24  -on bicarbonate tablets-holding starting January 28th     5) anemia-     -hemoglobin low at 7 4  -iron panel-iron saturation 20%  -received Venofer prior admission in November  -history of TIA, will avoid SURINDER for now but eventually may need to consider and we discussed with the patient  -decreasing hemoglobin is likely a factor of CKD but initial hemoglobin was 10 on 01/21 and now has decreased to 7 5   Unclear accuracy of 10  -transfusion per primary team  -iron panel with iron saturation 21%     6) T10 vertebral fracture-per primary team     7) electrolytes-sodium is stable, potassium is stable    SUBJECTIVE / 24H INTERVAL HISTORY:    Urine output improving 2 4 L  Ultrafiltration 1 L  Blood pressure 274-319 systolic  On room air  Edema appears improved on lower extremities      OBJECTIVE:  Current Weight: Weight - Scale: 130 kg (286 lb 9 6 oz)  Vitals:    01/29/22 1719 01/29/22 1911 01/30/22 0032 01/30/22 0742   BP: 153/70 153/69 153/67 141/66   BP Location:  Left arm  Left arm   Pulse:  68 76 72   Resp:  16 18 18   Temp:  98 °F (36 7 °C) 97 6 °F (36 4 °C) 98 1 °F (36 7 °C)   TempSrc:  Tympanic  Oral   SpO2:  96% 94% 93%   Weight:       Height:           Intake/Output Summary (Last 24 hours) at 1/30/2022 1315  Last data filed at 1/30/2022 0001  Gross per 24 hour   Intake 300 ml   Output 3400 ml   Net -3100 ml     General: NAD  Skin: no rash  Eyes: anicteric sclera  ENT: moist mucous membrane  Neck: supple  Chest: CTA b/l, no ronchii, no wheeze, no rubs, no rales  CVS: s1s2, no murmur, no gallop, no rub  Abdomen: soft, nontender, nl sounds, slightly distended  Extremities:  2+ edema slightly improved today  : no gaitan  Neuro: AAOX3  Psych: normal affect  -tunneled catheter site without drainage    Medications:    Current Facility-Administered Medications:     acetaminophen (TYLENOL) tablet 650 mg, 650 mg, Oral, Q6H KEMAL, Lore Amadorar, DO, 650 mg at 01/30/22 1217    allopurinol (ZYLOPRIM) tablet 100 mg, 100 mg, Oral, Daily, Abel Hdz MD, 100 mg at 01/30/22 0820    atorvastatin (LIPITOR) tablet 80 mg, 80 mg, Oral, Daily, Abel Hdz MD, 80 mg at 01/30/22 0820    carvedilol (COREG) tablet 12 5 mg, 12 5 mg, Oral, BID, Abel Hdz MD, 12 5 mg at 01/30/22 0820    furosemide (LASIX) injection 80 mg, 80 mg, Intravenous, Q8H, Brian Castro MD, 80 mg at 01/30/22 8717    HYDROmorphone (DILAUDID) injection 0 5 mg, 0 5 mg, Intravenous, Q4H PRN, Lore Silver, , 0 5 mg at 01/30/22 0821    HYDROmorphone (DILAUDID) injection 1 mg, 1 mg, Intravenous, Once, Abel Hdz MD    insulin lispro (HumaLOG) 100 units/mL subcutaneous injection 1-5 Units, 1-5 Units, Subcutaneous, HS, Abel Hdz MD, 1 Units at 01/29/22 2132    insulin lispro (HumaLOG) 100 units/mL subcutaneous injection 2-12 Units, 2-12 Units, Subcutaneous, TID AC, 2 Units at 01/27/22 1723 **AND** Fingerstick Glucose (POCT), , , TID AC, Joel Dalton MD    lidocaine (LIDODERM) 5 % patch 1 patch, 1 patch, Topical, Daily, Abel Hdz MD, 1 patch at 01/29/22 0832    NIFEdipine (PROCARDIA XL) 24 hr tablet 30 mg, 30 mg, Oral, BID, Ashley Rodriguez MD, 30 mg at 01/30/22 0820    ondansetron (ZOFRAN) injection 4 mg, 4 mg, Intravenous, Q6H PRN, Abel Hdz MD    oxyCODONE (ROXICODONE) IR tablet 2 5 mg, 2 5 mg, Oral, Q6H PRN, Abel Hdz MD    oxyCODONE (ROXICODONE) IR tablet 5 mg, 5 mg, Oral, Q6H PRN, Lore Silver DO, 5 mg at 01/30/22 1300    sevelamer (RENAGEL) tablet 800 mg, 800 mg, Oral, TID With Meals, Brian Castro MD, 800 mg at 01/30/22 1301    Laboratory Results:  Results from last 7 days   Lab Units 01/29/22  0539 01/28/22  0536 01/27/22  2388 01/26/22  0532 01/25/22  0548 01/24/22  0530   WBC Thousand/uL 7 35  --  7 36 7 85 7 12 6 67   HEMOGLOBIN g/dL 7 8*  --  8 0* 7 9* 7 4* 7 5*   HEMATOCRIT % 24 4*  --  24 7* 25 2* 23 5* 24 0*   PLATELETS Thousands/uL 280  --  307 297 272 284   POTASSIUM mmol/L 4 1 4 3 4 3 4 5 4 7 4 6   CHLORIDE mmol/L 102 100 102 103 102 102   CO2 mmol/L 23 22 20* 19* 16* 17*   BUN mg/dL 67* 89* 91* 85* 84* 76*   CREATININE mg/dL 6 59* 8 07* 8 19* 7 92* 7 36* 6 93*   CALCIUM mg/dL 8 0* 7 8* 7 7* 7 8* 7 5* 7 6*   MAGNESIUM mg/dL  --   --  1 9 1 9 1 8  --    PHOSPHORUS mg/dL 5 9*  --  7 6* 7 4* 7 4*  --

## 2022-01-30 NOTE — PLAN OF CARE
Problem: MOBILITY - ADULT  Goal: Maintain or return to baseline ADL function  Description: INTERVENTIONS:  -  Assess patient's ability to carry out ADLs; assess patient's baseline for ADL function and identify physical deficits which impact ability to perform ADLs (bathing, care of mouth/teeth, toileting, grooming, dressing, etc )  - Assess/evaluate cause of self-care deficits   - Assess range of motion  - Assess patient's mobility; develop plan if impaired  - Assess patient's need for assistive devices and provide as appropriate  - Encourage maximum independence but intervene and supervise when necessary  - Involve family in performance of ADLs  - Assess for home care needs following discharge   - Consider OT consult to assist with ADL evaluation and planning for discharge  - Provide patient education as appropriate  Outcome: Progressing     Problem: METABOLIC, FLUID AND ELECTROLYTES - ADULT  Goal: Electrolytes maintained within normal limits  Description: INTERVENTIONS:  - Monitor labs and assess patient for signs and symptoms of electrolyte imbalances  - Administer electrolyte replacement as ordered  - Monitor response to electrolyte replacements, including repeat lab results as appropriate  - Instruct patient on fluid and nutrition as appropriate  Outcome: Progressing

## 2022-01-30 NOTE — PROGRESS NOTES
[address] Internal Medicine Progress Note  Patient: Govind Lee 57 y o  male   MRN: [id number]  PCP: Dionne Pabon MD  Unit/Bed#: [address] [place] [address] Encounter: [id number]  Date Of Visit: 01/30/22    Problem List:    Principal Problem:    Acute kidney injury superimposed on chronic kidney disease ([facility] )  Active Problems:    T10 vertebral fracture ([facility] )    Diabetes mellitus ([facility] )    Hypertension    TIA (transient ischemic attack)    Anemia      Assessment & Plan:    * Acute kidney injury superimposed on chronic kidney disease [facility])  Assessment & Plan  Lab Results   Component Value Date    EGFR 10 01/30/2022    EGFR 8 01/29/2022    EGFR 6 01/28/2022    CREATININE 5 57 (H) 01/30/2022    CREATININE 6 59 (H) 01/29/2022    CREATININE 8 07 (H) 01/28/2022     Patient has history of CKD stage IV  Baseline creatinine around 4 3-4 7  Admission creatinine was 5 79  Patient also noted to have significant anasarca with minimally elevated potassium at 5 1  Etiology suspected to be acute kidney injury related to possible cardiorenal syndrome, use of NSAIDs or progression of CKD  Patient's previous SPEP/UPEP were negative and chronic hepatitis panel was negative  C3-C4 not low and NURIA was negative  GBM antibodies and ANCA panel negative  CT abdomen/pelvis 1/23 showed no hydronephrosis or urinary tract calculus  UA showed trace amount of blood and protein  Per nephrology, discontinued Actos as it can cause fluid retention  Tentatively scheduled for kidney biopsy 1/31; continue to hold Plavix; holding heparin  Nephrology suggesting DDAVP 20 micrograms IV x1 30 minutes prior to biopsy  Discussed with RN today to confirm time of biopsy tomorrow and to give medication 30 minutes prior  Tunneled dialysis catheter placed 1/27; monitor site for drainage/oozing  Lasix was increased to 80mg IV TID  IV albumin 25g Q12h d/c'ed  Patient received hemodialysis 1/28 and 1/29   Case management for outpatient dialysis placement  Sodium bicarb tablets discontinued 1/28    T10 vertebral fracture [facility])  Assessment & Plan  Patient reported severe low back pain x1 week prior to admission after slipping on a rug  Denies any bowel or bladder incontinence, tingling or numbness in the legs  Patient received fentanyl and 2 doses of Dilaudid in the ED with persistent pain  Lumbar x-ray showed no fractures or malalignment with possible ankylosing spondylitis  CT abdomen/pelvis 1/23 showed minimally displaced oblique fracture of the T10 vertebral body without extending into the posterior elements without any real bony retropulsion  Thoracic spine x-ray 1/24 does not visualize fracture well  Dr Coby Camacho discussed with Neurosurgery Dr Reagan Thapa - recommending TSLO brace and outpatient follow-up with neurosurgery  Continue acetaminophen 650 mg Q6h and Lidoderm 5% patch daily for pain management  Continue oxycodone 5mg along with 2 mg Q6h PRN and Dilaudid 0 5mg Q4h PRN breakthrough pain    Diabetes mellitus [facility])  Assessment & Plan  Lab Results   Component Value Date    HGBA1C 6 6 (H) 01/24/2022       Recent Labs     01/29/22  1558 01/29/22  2043 01/30/22  0816 01/30/22  1128   POCGLU 124 157* 137 134     Holding home linagliptin and Actos  Continue Humalog sliding scale with Accu-Cheks q a c  and HS  Blood sugars remain stable  Anemia  Assessment & Plan  Results from last 7 days   Lab Units 01/29/22  0539 01/27/22  0455 01/26/22  0532 01/25/22  0548 01/24/22  0530   HEMOGLOBIN g/dL 7 8* 8 0* 7 9* 7 4* 7 5*   HEMATOCRIT % 24 4* 24 7* 25 2* 23 5* 24 0*   MCV fL 92 93 93 93 94     Patient's hemoglobin dropped from 10-7 8  MCV normal   Baseline hemoglobin appears to be 8-8 5  Admission hemoglobin level of 10 questionable for accuracy  No evidence of active bleeding    Iron panel showed iron level of 34, TIBC of 163 and ferritin level of 61 suggesting iron deficiency-pending stool occult blood  Transfuse PRBC if hemoglobin drops < 7  Given his history of TIA to avoid SURINDER for now  TIA (transient ischemic attack)  Assessment & Plan  Continue home statin  Holding Plavix in preparation for kidney biopsy     Hypertension  Assessment & Plan  Continue home nifedipine 30 mg PO BID and Coreg 12 5 mg PO BID with holding parameters    Diarrhea-resolved as of 2022  Assessment & Plan  Patient complained of some diarrhea after admission  Diarrhea has since resolved  Stool for bacterial panel and C diff toxin were negative  CT scan of the abdomen pelvis showed no acute pathology  Abdominal exam continues to be benign            VTE Pharmacologic Prophylaxis:   None for renal biopsy tomorrow    Patient Centered Rounds: I performed bedside rounds with nursing staff today  Discussions with Specialists or Other Care Team Provider: yes    Education and Discussions with Family / Patient: yes    Time Spent for Care: 30 minutes  More than 50% of total time spent on counseling and coordination of care as described above  Current Length of Stay: 9 day(s)  Current Patient Status: Inpatient   Certification Statement: The patient will continue to require additional inpatient hospital stay due to Acute kidney injury on chronic kidney disease requiring dialysis, IV Lasix, kidney biopsy  Discharge Plan: Anticipate discharge in >72 hrs to rehab facility  Code Status: Level 1 - Full Code    Subjective:     Patient seen earlier today  Reported back pain and requesting pain medications    Objective:     Vitals:   Temp (24hrs), Av 9 °F (36 6 °C), Min:97 6 °F (36 4 °C), Max:98 1 °F (36 7 °C)    Temp:  [97 6 °F (36 4 °C)-98 1 °F (36 7 °C)] 98 1 °F (36 7 °C)  HR:  [68-76] 72  Resp:  [16-18] 18  BP: (141-153)/(66-70) 141/66  SpO2:  [93 %-96 %] 93 %  Body mass index is 37 81 kg/m²  Input and Output Summary (last 24 hours):      Intake/Output Summary (Last 24 hours) at 2022 1612  Last data filed at 2022 0001  Gross per 24 hour   Intake --   Output 1900 ml   Net -1900 ml Physical Exam:   Physical Exam  Constitutional:       General: He is not in acute distress  Appearance: He is not diaphoretic  HENT:      Head: Normocephalic and atraumatic  Eyes:      General:         Right eye: No discharge  Left eye: No discharge  Cardiovascular:      Rate and Rhythm: Normal rate and regular rhythm  Pulmonary:      Effort: Pulmonary effort is normal  No respiratory distress  Breath sounds: Normal breath sounds  No wheezing or rales  Abdominal:      General: Bowel sounds are normal  There is no distension  Palpations: Abdomen is soft  Tenderness: There is no abdominal tenderness  Musculoskeletal:      Comments: Bilateral lower extremity edema appears somewhat improved today   Skin:     Comments: Right chest wall tunneled dialysis catheter with no active bleeding   Neurological:      Mental Status: He is alert and oriented to person, place, and time  Additional Data:     Labs:  Results from last 7 days   Lab Units 01/29/22  0539   WBC Thousand/uL 7 35   HEMOGLOBIN g/dL 7 8*   HEMATOCRIT % 24 4*   PLATELETS Thousands/uL 280     Results from last 7 days   Lab Units 01/30/22  1339   SODIUM mmol/L 137   POTASSIUM mmol/L 4 0   CHLORIDE mmol/L 101   CO2 mmol/L 25   BUN mg/dL 51*   CREATININE mg/dL 5 57*   ANION GAP mmol/L 11   CALCIUM mg/dL 8 1*   GLUCOSE RANDOM mg/dL 120     Results from last 7 days   Lab Units 01/30/22  1339   INR  1 02     Results from last 7 days   Lab Units 01/30/22  1128 01/30/22  0816 01/29/22  2043 01/29/22  1558 01/29/22  1141 01/29/22  0745 01/29/22  0616 01/28/22  2153 01/28/22  1549 01/28/22  1156 01/28/22  0626 01/27/22  2033   POC GLUCOSE mg/dl 134 137 157* 124 127 111 122 147* 143* 115 108 82     Results from last 7 days   Lab Units 01/24/22  0530   HEMOGLOBIN A1C % 6 6*           Lines/Drains:  Invasive Devices  Report    Peripheral Intravenous Line            Peripheral IV 01/30/22 Dorsal (posterior); Left Forearm <1 day          Hemodialysis Catheter            HD Permanent Double Catheter 3 days                      Imaging: No pertinent imaging reviewed  Recent Cultures (last 7 days):         Last 24 Hours Medication List:   Current Facility-Administered Medications   Medication Dose Route Frequency Provider Last Rate    acetaminophen  650 mg Oral Q6H [address] Lore Silver DO      allopurinol  100 mg Oral Daily Salazar Pichardo MD      atorvastatin  80 mg Oral Daily Salazar Pichardo MD      carvedilol  12 5 mg Oral BID Salazar Pichardo MD      furosemide  80 mg Intravenous Q8H Thee Acosta MD      HYDROmorphone  0 5 mg Intravenous Q4H PRN Lore Silver DO      HYDROmorphone  1 mg Intravenous Once Salazar Pichardo MD      insulin lispro  1-5 Units Subcutaneous HS Salazar Pichardo MD      insulin lispro  2-12 Units Subcutaneous TID AC Joel Dalton MD      lidocaine  1 patch Topical Daily Salazar Pichardo MD      NIFEdipine  30 mg Oral BID Cherelle De La Garza MD      ondansetron  4 mg Intravenous Q6H PRN Salazar Pichardo MD      oxyCODONE  2 5 mg Oral Q6H PRN Salazar Pichardo MD      oxyCODONE  5 mg Oral Q6H PRN Lore Silver DO      sevelamer  800 mg Oral TID With Meals Thee Acosta MD          Today, Patient Was Seen By: Joni Evans DO    ** Please Note: "This note has been constructed using a voice recognition system  Therefore there may be syntax, spelling, and/or grammatical errors   Please call if you have any questions  "**

## 2022-01-31 ENCOUNTER — APPOINTMENT (INPATIENT)
Dept: DIALYSIS | Facility: HOSPITAL | Age: 62
DRG: 674 | End: 2022-01-31
Attending: INTERNAL MEDICINE
Payer: MEDICARE

## 2022-01-31 LAB
ANION GAP SERPL CALCULATED.3IONS-SCNC: 10 MMOL/L (ref 4–13)
BASOPHILS # BLD AUTO: 0.03 THOUSANDS/ΜL (ref 0–0.1)
BASOPHILS NFR BLD AUTO: 0 % (ref 0–1)
BUN SERPL-MCNC: 57 MG/DL (ref 5–25)
CALCIUM SERPL-MCNC: 7.8 MG/DL (ref 8.3–10.1)
CHLORIDE SERPL-SCNC: 103 MMOL/L (ref 100–108)
CO2 SERPL-SCNC: 25 MMOL/L (ref 21–32)
CREAT SERPL-MCNC: 6 MG/DL (ref 0.6–1.3)
EOSINOPHIL # BLD AUTO: 0.67 THOUSAND/ΜL (ref 0–0.61)
EOSINOPHIL NFR BLD AUTO: 7 % (ref 0–6)
ERYTHROCYTE [DISTWIDTH] IN BLOOD BY AUTOMATED COUNT: 14.5 % (ref 11.6–15.1)
GFR SERPL CREATININE-BSD FRML MDRD: 9 ML/MIN/1.73SQ M
GLUCOSE SERPL-MCNC: 108 MG/DL (ref 65–140)
GLUCOSE SERPL-MCNC: 116 MG/DL (ref 65–140)
GLUCOSE SERPL-MCNC: 118 MG/DL (ref 65–140)
GLUCOSE SERPL-MCNC: 122 MG/DL (ref 65–140)
GLUCOSE SERPL-MCNC: 145 MG/DL (ref 65–140)
HCT VFR BLD AUTO: 23.9 % (ref 36.5–49.3)
HGB BLD-MCNC: 7.5 G/DL (ref 12–17)
IMM GRANULOCYTES # BLD AUTO: 0.05 THOUSAND/UL (ref 0–0.2)
IMM GRANULOCYTES NFR BLD AUTO: 1 % (ref 0–2)
LYMPHOCYTES # BLD AUTO: 2.05 THOUSANDS/ΜL (ref 0.6–4.47)
LYMPHOCYTES NFR BLD AUTO: 23 % (ref 14–44)
MCH RBC QN AUTO: 29.4 PG (ref 26.8–34.3)
MCHC RBC AUTO-ENTMCNC: 31.4 G/DL (ref 31.4–37.4)
MCV RBC AUTO: 94 FL (ref 82–98)
MONOCYTES # BLD AUTO: 1.01 THOUSAND/ΜL (ref 0.17–1.22)
MONOCYTES NFR BLD AUTO: 11 % (ref 4–12)
MRSA NOSE QL CULT: NORMAL
NEUTROPHILS # BLD AUTO: 5.27 THOUSANDS/ΜL (ref 1.85–7.62)
NEUTS SEG NFR BLD AUTO: 58 % (ref 43–75)
NRBC BLD AUTO-RTO: 0 /100 WBCS
PLATELET # BLD AUTO: 277 THOUSANDS/UL (ref 149–390)
PMV BLD AUTO: 9.5 FL (ref 8.9–12.7)
POTASSIUM SERPL-SCNC: 4.2 MMOL/L (ref 3.5–5.3)
RBC # BLD AUTO: 2.55 MILLION/UL (ref 3.88–5.62)
SODIUM SERPL-SCNC: 138 MMOL/L (ref 136–145)
WBC # BLD AUTO: 9.08 THOUSAND/UL (ref 4.31–10.16)

## 2022-01-31 PROCEDURE — 99232 SBSQ HOSP IP/OBS MODERATE 35: CPT | Performed by: FAMILY MEDICINE

## 2022-01-31 PROCEDURE — 99232 SBSQ HOSP IP/OBS MODERATE 35: CPT | Performed by: INTERNAL MEDICINE

## 2022-01-31 PROCEDURE — 82948 REAGENT STRIP/BLOOD GLUCOSE: CPT

## 2022-01-31 PROCEDURE — 80048 BASIC METABOLIC PNL TOTAL CA: CPT | Performed by: INTERNAL MEDICINE

## 2022-01-31 PROCEDURE — 85025 COMPLETE CBC W/AUTO DIFF WBC: CPT | Performed by: INTERNAL MEDICINE

## 2022-01-31 RX ADMIN — NIFEDIPINE 30 MG: 30 TABLET, FILM COATED, EXTENDED RELEASE ORAL at 09:00

## 2022-01-31 RX ADMIN — FUROSEMIDE 80 MG: 10 INJECTION, SOLUTION INTRAMUSCULAR; INTRAVENOUS at 16:30

## 2022-01-31 RX ADMIN — ACETAMINOPHEN 650 MG: 325 TABLET, FILM COATED ORAL at 05:50

## 2022-01-31 RX ADMIN — ATORVASTATIN CALCIUM 80 MG: 80 TABLET, FILM COATED ORAL at 09:00

## 2022-01-31 RX ADMIN — SEVELAMER HYDROCHLORIDE 800 MG: 800 TABLET, FILM COATED PARENTERAL at 07:30

## 2022-01-31 RX ADMIN — OXYCODONE HYDROCHLORIDE 5 MG: 5 TABLET ORAL at 02:19

## 2022-01-31 RX ADMIN — CARVEDILOL 12.5 MG: 12.5 TABLET, FILM COATED ORAL at 18:08

## 2022-01-31 RX ADMIN — ACETAMINOPHEN 650 MG: 325 TABLET, FILM COATED ORAL at 23:52

## 2022-01-31 RX ADMIN — ALLOPURINOL 100 MG: 100 TABLET ORAL at 09:00

## 2022-01-31 RX ADMIN — ACETAMINOPHEN 650 MG: 325 TABLET, FILM COATED ORAL at 18:08

## 2022-01-31 RX ADMIN — SEVELAMER HYDROCHLORIDE 800 MG: 800 TABLET, FILM COATED PARENTERAL at 18:08

## 2022-01-31 RX ADMIN — FUROSEMIDE 80 MG: 10 INJECTION, SOLUTION INTRAMUSCULAR; INTRAVENOUS at 01:01

## 2022-01-31 RX ADMIN — HYDROMORPHONE HYDROCHLORIDE 0.5 MG: 1 INJECTION, SOLUTION INTRAMUSCULAR; INTRAVENOUS; SUBCUTANEOUS at 15:20

## 2022-01-31 RX ADMIN — SEVELAMER HYDROCHLORIDE 800 MG: 800 TABLET, FILM COATED PARENTERAL at 12:00

## 2022-01-31 RX ADMIN — FUROSEMIDE 80 MG: 10 INJECTION, SOLUTION INTRAMUSCULAR; INTRAVENOUS at 09:00

## 2022-01-31 RX ADMIN — CARVEDILOL 12.5 MG: 12.5 TABLET, FILM COATED ORAL at 07:00

## 2022-01-31 RX ADMIN — ACETAMINOPHEN 650 MG: 325 TABLET, FILM COATED ORAL at 01:01

## 2022-01-31 RX ADMIN — ACETAMINOPHEN 650 MG: 325 TABLET, FILM COATED ORAL at 12:00

## 2022-01-31 RX ADMIN — NIFEDIPINE 30 MG: 30 TABLET, FILM COATED, EXTENDED RELEASE ORAL at 18:08

## 2022-01-31 NOTE — CASE MANAGEMENT
Case Management Discharge Planning Note    Patient name Maria Luz Anderson  Location 2 [facility] 213/2 705 [facility] MRN [id number]  : 1960 Date 2022       Current Admission Date: 2022  Current Admission Diagnosis:Acute kidney injury superimposed on chronic kidney disease [facility])   Patient Active Problem List    Diagnosis Date Noted    ESRD (end stage renal disease) ([facility] )     Hypervolemia     Anemia 2022    Acute kidney injury superimposed on chronic kidney disease ([facility] ) 2022    Diabetes mellitus ([place] [address] )     Hypertension     TIA (transient ischemic attack)     T10 vertebral fracture ([facility])       LOS (days): 10  Geometric Mean LOS (GMLOS) (days): 5 90  Days to GMLOS:-4 2     OBJECTIVE:  Risk of Unplanned Readmission Score: 21     Current admission status: Inpatient   Preferred Pharmacy:   [address]  Phone: [phone] Fax: [phone]    Primary Care Provider: Denisa Corado MD    Primary Insurance: MEDICARE  Secondary Insurance:     DISCHARGE DETAILS:    Discharge planning discussed with[de-identified] Patient  Freedom of Choice: Yes  Comments - Freedom of Choice: SW following to assist with DCP  STR placement and outpatient dialysis is planned  Pt expressed preference of being in a rehab that could assist with transportation to dialysis  Pt has been accepted by [facility] and facility does provide dialysis transport  Pt has also been approved by [facility] Admissions for treatment at [address] [place] [address] Way  Reviewed all information with pt and pt is content with plan  SW offered ongoing support and assistance  SW will follow to assist with transfer when ready  Other Referral/Resources/Interventions Provided:  Interventions: [address], [address]  Term Rehab  Referral Comments: STR planned at [facility]    Dialysis arrangements are set with [facility] on a TTS 11:15 am schedule  Gordon Silence can start treatment on Thursday, 2/3/22  Treatment Team Recommendation: Short Term Rehab  Discharge Destination Plan[de-identified] Short Term Rehab  Transport at Discharge : [facility] (pending progress)    IMM Given (Date):: 01/31/22  IMM Given to[de-identified] Patient  Family notified[de-identified] IMM reviewed with pt  Pt verbalized understanding  Pt signed IMM and copy given  Copy also placed in scan bin for chart

## 2022-01-31 NOTE — PROGRESS NOTES
[address] Internal Medicine Progress Note  Patient: Sarah Rodriguez 57 y o  male   MRN: [id number]  PCP: Regan Florian MD  Unit/Bed#: 2 5 [facility] Encounter: [id number]  Date Of Visit: 01/31/22    Problem List:    Principal Problem:    Acute kidney injury superimposed on chronic kidney disease ([facility] )  Active Problems:    T10 vertebral fracture ([facility] [address] )    Diabetes mellitus ([facility] )    Hypertension    TIA (transient ischemic attack)    Anemia    ESRD (end stage renal disease) ([facility] )    Hypervolemia      Assessment & Plan:    * Acute kidney injury superimposed on chronic kidney disease [facility])  Assessment & Plan  Lab Results   Component Value Date    EGFR 9 01/31/2022    EGFR 10 01/30/2022    EGFR 8 01/29/2022    CREATININE 6 00 (H) 01/31/2022    CREATININE 5 57 (H) 01/30/2022    CREATININE 6 59 (H) 01/29/2022     Patient has history of CKD stage IV  Baseline creatinine around 4 3-4 7  Admission creatinine was 5 79  Patient also noted to have significant anasarca with minimally elevated potassium at 5 1  · Etiology suspected to be acute kidney injury related to possible cardiorenal syndrome, use of NSAIDs or progression of CKD  · Patient's previous SPEP/UPEP were negative and chronic hepatitis panel was negative  C3-C4 not low and NURIA was negative  · GBM antibodies and ANCA panel negative  · CT abdomen/pelvis 1/23 showed no hydronephrosis or urinary tract calculus  · UA showed trace amount of blood and protein  · Per nephrology, discontinued Actos as it can cause fluid retention  · Tunneled dialysis catheter placed 1/27; continue to monitor site for drainage/oozing  · Sodium bicarb tablets and IV albumin 25g Q12h discontinued 1/28  · Lasix was increased to 80mg IV Q8h  · Patient received hemodialysis 1/28 and 1/29   Plan for dialysis today  · Case management for outpatient dialysis placement  · Kidney biopsy was planned for today but after discussion between pt and Nephrology, it was canceled    T10 vertebral fracture [facility])  Assessment & Plan  Patient reported severe low back pain x1 week prior to admission after slipping on a rug  Denies any bowel or bladder incontinence, tingling or numbness in the legs  · Patient received fentanyl and 2 doses of Dilaudid in the ED with persistent pain  · Lumbar x-ray showed no fractures or malalignment with possible ankylosing spondylitis  · CT abdomen/pelvis 1/23 showed minimally displaced oblique fracture of the T10 vertebral body without extending into the posterior elements without any real bony retropulsion  · Thoracic spine x-ray 1/24 does not visualize fracture well  · Dr Chele Beltrán discussed with Neurosurgery Dr Sena Edwards - recommending TSLO brace and outpatient follow-up with neurosurgery  · Continue acetaminophen 650 mg Q6h and Lidoderm 5% patch daily for pain management  · Continue oxycodone 5mg along with 2 mg Q6h PRN and Dilaudid 0 5mg Q4h PRN breakthrough pain  Diabetes mellitus [facility])  Assessment & Plan  Lab Results   Component Value Date    HGBA1C 6 6 (H) 01/24/2022       Recent Labs     01/30/22  1624 01/30/22  2200 01/31/22  0741 01/31/22  1137   POCGLU 162* 130 122 116     Holding home linagliptin and Actos  Continue Humalog sliding scale with Accu-Cheks q a c  and HS  Blood sugars remain stable    ESRD (end stage renal disease) [facility])  Assessment & Plan  Lab Results   Component Value Date    EGFR 9 01/31/2022    EGFR 10 01/30/2022    EGFR 8 01/29/2022    CREATININE 6 00 (H) 01/31/2022    CREATININE 5 57 (H) 01/30/2022    CREATININE 6 59 (H) 01/29/2022       Anemia  Assessment & Plan  Results from last 7 days   Lab Units 01/31/22  0534 01/29/22  0539 01/27/22  0455 01/26/22  0532 01/25/22  0548   HEMOGLOBIN g/dL 7 5* 7 8* 8 0* 7 9* 7 4*   HEMATOCRIT % 23 9* 24 4* 24 7* 25 2* 23 5*   MCV fL 94 92 93 93 93     Patient's hemoglobin dropped from 10-7 8  MCV normal   Baseline hemoglobin appears to be 8-8 5  Admission hemoglobin level of 10 questionable for accuracy    No evidence of active bleeding  Iron panel showed iron level of 34, TIBC of 163 and ferritin level of 61 suggesting iron deficiency-pending stool occult blood  Transfuse PRBC if hemoglobin drops < 7  Given his history of TIA to avoid SURINDER for now  Hemoglobin remains stable in the 7s    TIA (transient ischemic attack)  Assessment & Plan  Continue home statin  Plavix was held in preparation for kidney biopsy ; can resume Plavix  Hypertension  Assessment & Plan  Continue home nifedipine 30 mg PO BID and Coreg 12 5 mg PO BID with holding parameters  Diarrhea-resolved as of 2022  Assessment & Plan  Patient complained of some diarrhea after admission  Diarrhea has since resolved  Stool for bacterial panel and C diff toxin were negative  CT scan of the abdomen pelvis showed no acute pathology  Abdominal exam continues to be benign  VTE Pharmacologic Prophylaxis:   Moderate Risk (Score 3-4) - Pharmacological DVT Prophylaxis Ordered: heparin  Patient Centered Rounds: I performed bedside rounds with nursing staff today  Discussions with Specialists or Other Care Team Provider: yes - renal    Education and Discussions with Family / Patient: yes    Time Spent for Care: 30 minutes  More than 50% of total time spent on counseling and coordination of care as described above  Current Length of Stay: 10 day(s)  Current Patient Status: Inpatient   Certification Statement: The patient will continue to require additional inpatient hospital stay due to acute kidney injury on CKD, dialysis dependent, IV lasix  Discharge Plan: Anticipate discharge in 24-48 hrs to rehab facility  Code Status: Level 1 - Full Code    Subjective:   Patient without new or worsening complaints  Hopeful that with initiation of dialysis his kidney function will improve  He is awaiting dialysis today and states that his leg edema is improving       Objective:     Vitals:   Temp (24hrs), Av 1 °F (36 7 °C), Min:97 8 °F (36 6 °C), Max:98 4 °F (36 9 °C)    Temp:  [97 8 °F (36 6 °C)-98 4 °F (36 9 °C)] 98 4 °F (36 9 °C)  HR:  [68-85] 68  Resp:  [16-18] 18  BP: (141-153)/(60-68) 141/60  SpO2:  [95 %-97 %] 97 %  Body mass index is 37 81 kg/m²  Input and Output Summary (last 24 hours): Intake/Output Summary (Last 24 hours) at 1/31/2022 1359  Last data filed at 1/31/2022 0701  Gross per 24 hour   Intake --   Output 1600 ml   Net -1600 ml       Physical Exam:   Physical Exam  Constitutional:       General: He is not in acute distress  Appearance: He is obese  HENT:      Head: Normocephalic and atraumatic  Eyes:      Conjunctiva/sclera: Conjunctivae normal    Cardiovascular:      Rate and Rhythm: Normal rate and regular rhythm  Pulmonary:      Effort: Pulmonary effort is normal  No respiratory distress  Breath sounds: Normal breath sounds  No wheezing or rales  Chest:      Comments: + Hemodialysis catheter, covered with clear occlusive dressing  Some blood present under bandage, without erythema or discharge  Abdominal:      General: Bowel sounds are normal  There is no distension  Palpations: Abdomen is soft  Tenderness: There is no abdominal tenderness  Musculoskeletal:      Right lower leg: Edema present  Left lower leg: Edema present  Comments: Lower extremity edema improved   Neurological:      Mental Status: He is alert and oriented to person, place, and time           Additional Data:     Labs:  Results from last 7 days   Lab Units 01/31/22  0534   WBC Thousand/uL 9 08   HEMOGLOBIN g/dL 7 5*   HEMATOCRIT % 23 9*   PLATELETS Thousands/uL 277   NEUTROS PCT % 58   LYMPHS PCT % 23   MONOS PCT % 11   EOS PCT % 7*     Results from last 7 days   Lab Units 01/31/22  0534   SODIUM mmol/L 138   POTASSIUM mmol/L 4 2   CHLORIDE mmol/L 103   CO2 mmol/L 25   BUN mg/dL 57*   CREATININE mg/dL 6 00*   ANION GAP mmol/L 10   CALCIUM mg/dL 7 8*   GLUCOSE RANDOM mg/dL 118     Results from last 7 days   Lab Units 01/30/22  1339   INR  1 02     Results from last 7 days   Lab Units 01/31/22  1137 01/31/22  0741 01/30/22  2200 01/30/22  1624 01/30/22  1128 01/30/22  0816 01/29/22  2043 01/29/22  1558 01/29/22  1141 01/29/22  0745 01/29/22  0616 01/28/22  2153   POC GLUCOSE mg/dl 116 122 130 162* 134 137 157* 124 127 111 122 147*               Lines/Drains:  Invasive Devices  Report    Peripheral Intravenous Line            Peripheral IV 01/30/22 Dorsal (posterior); Left Forearm 1 day          Hemodialysis Catheter            HD Permanent Double Catheter 3 days                      Imaging: No pertinent imaging reviewed  Recent Cultures (last 7 days):         Last 24 Hours Medication List:   Current Facility-Administered Medications   Medication Dose Route Frequency Provider Last Rate    acetaminophen  650 mg Oral Q6H [address] Lore Silver DO      allopurinol  100 mg Oral Daily Neo Shin MD      atorvastatin  80 mg Oral Daily Neo Shin MD      carvedilol  12 5 mg Oral BID Neo Shin MD      furosemide  80 mg Intravenous Q8H Eitan Ware MD      HYDROmorphone  0 5 mg Intravenous Q4H PRN Lore Silver DO      HYDROmorphone  1 mg Intravenous Once Neo Shin MD      insulin lispro  1-5 Units Subcutaneous HS Neo Shin MD      insulin lispro  2-12 Units Subcutaneous TID AC Joel Dalton MD      lidocaine  1 patch Topical Daily Neo Shin MD      NIFEdipine  30 mg Oral BID Kenny Lopez MD      ondansetron  4 mg Intravenous Q6H PRN Neo Shin MD      oxyCODONE  2 5 mg Oral Q6H PRN Neo Shin MD      oxyCODONE  5 mg Oral Q6H PRN Lore Silver DO      sevelamer  800 mg Oral TID With Meals Eitan Ware MD          Today, Patient Was Seen By: Elizabeth Galloway DO    ** Please Note: "This note has been constructed using a voice recognition system  Therefore there may be syntax, spelling, and/or grammatical errors   Please call if you have any questions  "**

## 2022-01-31 NOTE — ASSESSMENT & PLAN NOTE
Lab Results   Component Value Date    EGFR 9 01/31/2022    EGFR 10 01/30/2022    EGFR 8 01/29/2022    CREATININE 6 00 (H) 01/31/2022    CREATININE 5 57 (H) 01/30/2022    CREATININE 6 59 (H) 01/29/2022

## 2022-01-31 NOTE — PLAN OF CARE
Goal 3 0 kg with treatment  Problem: METABOLIC, FLUID AND ELECTROLYTES - ADULT  Goal: Electrolytes maintained within normal limits  Description: INTERVENTIONS:  - Monitor labs and assess patient for signs and symptoms of electrolyte imbalances  - Administer electrolyte replacement as ordered  - Monitor response to electrolyte replacements, including repeat lab results as appropriate  - Instruct patient on fluid and nutrition as appropriate  Outcome: Progressing  Goal: Fluid balance maintained  Description: INTERVENTIONS:  - Monitor labs   - Monitor I/O and WT  - Instruct patient on fluid and nutrition as appropriate  - Assess for signs & symptoms of volume excess or deficit  Outcome: Progressing

## 2022-01-31 NOTE — OCCUPATIONAL THERAPY NOTE
OCCUPATIONAL THERAPY       01/31/22 1140   Note Type   Note Type Cancelled Session   Cancel Reasons Other  (pt refused)   Licensure   [place] License Number  [employer]   [place] [address], OTR/L, [place] Lic# [id number]

## 2022-01-31 NOTE — PROGRESS NOTES
NEPHROLOGY PROGRESS NOTE    Sherrill Torres 57 y o  male MRN: [id number]  Unit/Bed#: 41316 Marilyn Kimball Encounter: [id number]  Reason for Consult:  End-stage renal disease    The patient is awake and alert says that he still has back pain with certain positions  Also he feels bloated and full of fluid  He tolerated dialysis the other day  Other than that no acute changes  He just told me he was feeling down as he has been through a lot in his life recently  ASSESSMENT/PLAN:  1  Renal    The patient has advanced chronic kidney disease and I SPECT end-stage renal disease  I looked over his case and reviewed outpatient records  In 2017 there was a creatinine of 1 7  It continue to worsen  In June of 2019 there was more than 5 8 g of proteinuria and then November of last year creatinine increased to 4 5 any presented now with a creatinine of 8  Protein estimation is over 10 g now  He had seen Nephrology in the past at another facility  Urinalysis did not have a nephritic sediment  Serologic workup was unrevealing as noted in my partners note from yesterday  More than likely the patient has underlying diabetic nephropathy and I really do not see the utility of doing a kidney biopsy in a statin talk to the patient about this  I explained to him that he is a longstanding diabetic and that this problem started years ago and given that serologies are negative there is no blood in the urine it is very very unlikely that a biopsy would   I suspect we would just see severe end-stage diabetic kidney disease  Regardless of this patient did 1 have the biopsy done today due to concern about getting the procedure feeling bloated  I spoke to Interventional Radiology and they did have anesthesia set up but I told will cancel the procedure  Plan for hemodialysis today with volume removal to challenge for 3 L negative      Case management to arrange outpatient dialysis placement prior to discharge  Discontinue renal biopsy as given the chronicity and lack of clinical findings that would suggest it would change treatment at this point I do not feel he needs the renal biopsy patient is in agreement with that  Apparently patient had history of TIA in the past stated my partners notes holding on any SURINDER  2  T10 vertebral fracture    SUBJECTIVE:  Review of Systems   Constitutional: Negative for chills, diaphoresis, fever and night sweats  HENT: Negative  Eyes: Negative  Cardiovascular: Positive for leg swelling  Negative for chest pain, dyspnea on exertion and orthopnea  Respiratory: Negative  Negative for cough, shortness of breath, sputum production and wheezing  Gastrointestinal: Positive for bloating  Negative for abdominal pain, diarrhea, nausea and vomiting  Genitourinary: Negative for dysuria, flank pain, hematuria and incomplete emptying  Neurological: Negative for dizziness, focal weakness, headaches and light-headedness  Psychiatric/Behavioral: Negative for altered mental status, depression, hallucinations and hypervigilance  OBJECTIVE:  Current Weight: Weight - Scale: 130 kg (286 lb 9 6 oz)  Vitals:Temp (24hrs), Av 1 °F (36 7 °C), Min:97 8 °F (36 6 °C), Max:98 4 °F (36 9 °C)  Current: Temperature: 98 4 °F (36 9 °C)   Blood pressure 141/60, pulse 68, temperature 98 4 °F (36 9 °C), temperature source Tympanic, resp  rate 18, height 6' 1" (1 854 m), weight 130 kg (286 lb 9 6 oz), SpO2 97 %  , Body mass index is 37 81 kg/m²  Intake/Output Summary (Last 24 hours) at 2022 1034  Last data filed at 2022 0701  Gross per 24 hour   Intake 100 ml   Output 1600 ml   Net -1500 ml       Physical Exam: /60 (BP Location: Right arm)   Pulse 68   Temp 98 4 °F (36 9 °C) (Tympanic)   Resp 18   Ht 6' 1" (1 854 m)   Wt 130 kg (286 lb 9 6 oz)   SpO2 97%   BMI 37 81 kg/m²   Physical Exam  Constitutional:       General: He is not in acute distress  Appearance: He is not toxic-appearing or diaphoretic  HENT:      Head: Normocephalic and atraumatic  Nose:      Comments: Wearing mask     Mouth/Throat:      Comments: Wearing mask  Eyes:      General: No scleral icterus  Extraocular Movements: Extraocular movements intact  Cardiovascular:      Rate and Rhythm: Normal rate and regular rhythm  Heart sounds: No friction rub  No gallop  Comments: Positive edema  Pulmonary:      Effort: Pulmonary effort is normal  No respiratory distress  Breath sounds: Normal breath sounds  No wheezing, rhonchi or rales  Abdominal:      General: Bowel sounds are normal  There is no distension  Palpations: Abdomen is soft  Tenderness: There is no abdominal tenderness  There is no rebound  Musculoskeletal:      Cervical back: Normal range of motion and neck supple  Neurological:      Mental Status: He is alert           Medications:    Current Facility-Administered Medications:     acetaminophen (TYLENOL) tablet 650 mg, 650 mg, Oral, Q6H KEMAL, Lore Silver DO, 650 mg at 01/31/22 0550    allopurinol (ZYLOPRIM) tablet 100 mg, 100 mg, Oral, Daily, Diane Coello MD, 100 mg at 01/31/22 0900    atorvastatin (LIPITOR) tablet 80 mg, 80 mg, Oral, Daily, Diane Coello MD, 80 mg at 01/31/22 0900    carvedilol (COREG) tablet 12 5 mg, 12 5 mg, Oral, BID, Diane Coello MD, 12 5 mg at 01/31/22 0700    furosemide (LASIX) injection 80 mg, 80 mg, Intravenous, Q8H, Khanh Grande MD, 80 mg at 01/31/22 0900    HYDROmorphone (DILAUDID) injection 0 5 mg, 0 5 mg, Intravenous, Q4H PRN, Lore Silver DO, 0 5 mg at 01/30/22 0821    HYDROmorphone (DILAUDID) injection 1 mg, 1 mg, Intravenous, Once, Diane Coello MD    insulin lispro (HumaLOG) 100 units/mL subcutaneous injection 1-5 Units, 1-5 Units, Subcutaneous, HS, Diane Coello MD, 1 Units at 01/29/22 2132    insulin lispro (HumaLOG) 100 units/mL subcutaneous injection 2-12 Units, 2-12 Units, Subcutaneous, TID AC, 2 Units at 01/30/22 1706 **AND** Fingerstick Glucose (POCT), , , TID AC, Joel Dalton MD    lidocaine (LIDODERM) 5 % patch 1 patch, 1 patch, Topical, Daily, Tania Patterson MD, 1 patch at 01/31/22 0900    NIFEdipine (PROCARDIA XL) 24 hr tablet 30 mg, 30 mg, Oral, BID, Felton Vital MD, 30 mg at 01/31/22 0900    ondansetron (ZOFRAN) injection 4 mg, 4 mg, Intravenous, Q6H PRN, Tania Patterson MD    oxyCODONE (ROXICODONE) IR tablet 2 5 mg, 2 5 mg, Oral, Q6H PRN, Tania Patterson MD    oxyCODONE (ROXICODONE) IR tablet 5 mg, 5 mg, Oral, Q6H PRN, Lore Silver DO, 5 mg at 01/31/22 0219    sevelamer (RENAGEL) tablet 800 mg, 800 mg, Oral, TID With Meals, Tiffanie Jolley MD, 800 mg at 01/31/22 0730    Laboratory Results:  Lab Results   Component Value Date    WBC 9 08 01/31/2022    HGB 7 5 (L) 01/31/2022    HCT 23 9 (L) 01/31/2022    MCV 94 01/31/2022     01/31/2022     Lab Results   Component Value Date    SODIUM 138 01/31/2022    K 4 2 01/31/2022     01/31/2022    CO2 25 01/31/2022    BUN 57 (H) 01/31/2022    CREATININE 6 00 (H) 01/31/2022    GLUC 118 01/31/2022    CALCIUM 7 8 (L) 01/31/2022     Lab Results   Component Value Date    CALCIUM 7 8 (L) 01/31/2022    PHOS 5 9 (H) 01/29/2022     No results found for: LABPROT

## 2022-01-31 NOTE — HEMODIALYSIS
Post-Dialysis RN Treatment Note    Blood Pressure:  Pre   142/62   68  mm/Hg  Post 169/69  66   mmHg   EDW  TBD kg    Weight:  Pre  125 4   kg   Post 122 4 kg   Mode of weight measurement:    Volume Removed 3000  ml    Treatment duration  210 minutes    NS given  NA    Treatment shortened?     Medications given during Rx NA   Estimated Kt/V  NA   Access type: CVC   Access Issues:  None   Report called to primary nurse   Jeremy Hair RN

## 2022-01-31 NOTE — PLAN OF CARE
Problem: Prexisting or High Potential for Compromised Skin Integrity  Goal: Skin integrity is maintained or improved  Description: INTERVENTIONS:  - Identify patients at risk for skin breakdown  - Assess and monitor skin integrity  - Assess and monitor nutrition and hydration status  - Monitor labs   - Assess for incontinence   - Assist with mobility/ambulation  - Relieve pressure over bony prominences  - Avoid friction and shearing  - Provide appropriate hygiene as needed including keeping skin clean and dry  - Evaluate need for skin moisturizer/barrier cream  - Collaborate with interdisciplinary team   - Patient/family teaching  - Consider wound care consult   Outcome: Progressing     Problem: MOBILITY - ADULT  Goal: Maintain or return to baseline ADL function  Description: INTERVENTIONS:  -  Assess patient's ability to carry out ADLs; assess patient's baseline for ADL function and identify physical deficits which impact ability to perform ADLs (bathing, care of mouth/teeth, toileting, grooming, dressing, etc )  - Assess/evaluate cause of self-care deficits   - Assess range of motion  - Assess patient's mobility; develop plan if impaired  - Assess patient's need for assistive devices and provide as appropriate  - Encourage maximum independence but intervene and supervise when necessary  - Involve family in performance of ADLs  - Assess for home care needs following discharge   - Consider OT consult to assist with ADL evaluation and planning for discharge  - Provide patient education as appropriate  Outcome: Progressing

## 2022-02-01 LAB
ANION GAP SERPL CALCULATED.3IONS-SCNC: 7 MMOL/L (ref 4–13)
BUN SERPL-MCNC: 31 MG/DL (ref 5–25)
CALCIUM SERPL-MCNC: 8.2 MG/DL (ref 8.3–10.1)
CHLORIDE SERPL-SCNC: 99 MMOL/L (ref 100–108)
CO2 SERPL-SCNC: 30 MMOL/L (ref 21–32)
CREAT SERPL-MCNC: 4.33 MG/DL (ref 0.6–1.3)
FLUAV RNA RESP QL NAA+PROBE: NEGATIVE
FLUBV RNA RESP QL NAA+PROBE: NEGATIVE
GFR SERPL CREATININE-BSD FRML MDRD: 13 ML/MIN/1.73SQ M
GLUCOSE SERPL-MCNC: 107 MG/DL (ref 65–140)
GLUCOSE SERPL-MCNC: 151 MG/DL (ref 65–140)
GLUCOSE SERPL-MCNC: 153 MG/DL (ref 65–140)
GLUCOSE SERPL-MCNC: 162 MG/DL (ref 65–140)
GLUCOSE SERPL-MCNC: 162 MG/DL (ref 65–140)
HCT VFR BLD AUTO: 25 % (ref 36.5–49.3)
HGB BLD-MCNC: 7.9 G/DL (ref 12–17)
POTASSIUM SERPL-SCNC: 3.9 MMOL/L (ref 3.5–5.3)
RSV RNA RESP QL NAA+PROBE: NEGATIVE
SARS-COV-2 RNA RESP QL NAA+PROBE: NEGATIVE
SODIUM SERPL-SCNC: 136 MMOL/L (ref 136–145)

## 2022-02-01 PROCEDURE — 80048 BASIC METABOLIC PNL TOTAL CA: CPT | Performed by: FAMILY MEDICINE

## 2022-02-01 PROCEDURE — 97116 GAIT TRAINING THERAPY: CPT

## 2022-02-01 PROCEDURE — 99232 SBSQ HOSP IP/OBS MODERATE 35: CPT | Performed by: INTERNAL MEDICINE

## 2022-02-01 PROCEDURE — 85014 HEMATOCRIT: CPT | Performed by: FAMILY MEDICINE

## 2022-02-01 PROCEDURE — 99232 SBSQ HOSP IP/OBS MODERATE 35: CPT | Performed by: FAMILY MEDICINE

## 2022-02-01 PROCEDURE — 82948 REAGENT STRIP/BLOOD GLUCOSE: CPT

## 2022-02-01 PROCEDURE — 85018 HEMOGLOBIN: CPT | Performed by: FAMILY MEDICINE

## 2022-02-01 PROCEDURE — 0241U HB NFCT DS VIR RESP RNA 4 TRGT: CPT | Performed by: FAMILY MEDICINE

## 2022-02-01 RX ORDER — CLOPIDOGREL BISULFATE 75 MG/1
75 TABLET ORAL DAILY
Status: DISCONTINUED | OUTPATIENT
Start: 2022-02-01 | End: 2022-02-02 | Stop reason: HOSPADM

## 2022-02-01 RX ORDER — HEPARIN SODIUM 5000 [USP'U]/ML
5000 INJECTION, SOLUTION INTRAVENOUS; SUBCUTANEOUS EVERY 8 HOURS SCHEDULED
Status: DISCONTINUED | OUTPATIENT
Start: 2022-02-01 | End: 2022-02-02 | Stop reason: HOSPADM

## 2022-02-01 RX ADMIN — INSULIN LISPRO 2 UNITS: 100 INJECTION, SOLUTION INTRAVENOUS; SUBCUTANEOUS at 11:05

## 2022-02-01 RX ADMIN — ALLOPURINOL 100 MG: 100 TABLET ORAL at 08:07

## 2022-02-01 RX ADMIN — SEVELAMER HYDROCHLORIDE 800 MG: 800 TABLET, FILM COATED PARENTERAL at 17:16

## 2022-02-01 RX ADMIN — OXYCODONE HYDROCHLORIDE 5 MG: 5 TABLET ORAL at 19:33

## 2022-02-01 RX ADMIN — ACETAMINOPHEN 650 MG: 325 TABLET, FILM COATED ORAL at 17:16

## 2022-02-01 RX ADMIN — ACETAMINOPHEN 650 MG: 325 TABLET, FILM COATED ORAL at 23:33

## 2022-02-01 RX ADMIN — CARVEDILOL 12.5 MG: 12.5 TABLET, FILM COATED ORAL at 08:07

## 2022-02-01 RX ADMIN — OXYCODONE HYDROCHLORIDE 5 MG: 5 TABLET ORAL at 08:07

## 2022-02-01 RX ADMIN — ATORVASTATIN CALCIUM 80 MG: 80 TABLET, FILM COATED ORAL at 08:07

## 2022-02-01 RX ADMIN — OXYCODONE HYDROCHLORIDE 5 MG: 5 TABLET ORAL at 00:52

## 2022-02-01 RX ADMIN — FUROSEMIDE 80 MG: 10 INJECTION, SOLUTION INTRAMUSCULAR; INTRAVENOUS at 08:07

## 2022-02-01 RX ADMIN — INSULIN LISPRO 1 UNITS: 100 INJECTION, SOLUTION INTRAVENOUS; SUBCUTANEOUS at 21:33

## 2022-02-01 RX ADMIN — HYDROMORPHONE HYDROCHLORIDE 0.5 MG: 1 INJECTION, SOLUTION INTRAMUSCULAR; INTRAVENOUS; SUBCUTANEOUS at 11:04

## 2022-02-01 RX ADMIN — SEVELAMER HYDROCHLORIDE 800 MG: 800 TABLET, FILM COATED PARENTERAL at 08:07

## 2022-02-01 RX ADMIN — NIFEDIPINE 30 MG: 30 TABLET, FILM COATED, EXTENDED RELEASE ORAL at 17:16

## 2022-02-01 RX ADMIN — ACETAMINOPHEN 650 MG: 325 TABLET, FILM COATED ORAL at 05:37

## 2022-02-01 RX ADMIN — SEVELAMER HYDROCHLORIDE 800 MG: 800 TABLET, FILM COATED PARENTERAL at 11:04

## 2022-02-01 RX ADMIN — FUROSEMIDE 80 MG: 10 INJECTION, SOLUTION INTRAMUSCULAR; INTRAVENOUS at 00:06

## 2022-02-01 RX ADMIN — HEPARIN SODIUM 5000 UNITS: 5000 INJECTION INTRAVENOUS; SUBCUTANEOUS at 21:33

## 2022-02-01 RX ADMIN — INSULIN LISPRO 2 UNITS: 100 INJECTION, SOLUTION INTRAVENOUS; SUBCUTANEOUS at 16:30

## 2022-02-01 RX ADMIN — NIFEDIPINE 30 MG: 30 TABLET, FILM COATED, EXTENDED RELEASE ORAL at 08:07

## 2022-02-01 RX ADMIN — CARVEDILOL 12.5 MG: 12.5 TABLET, FILM COATED ORAL at 17:16

## 2022-02-01 RX ADMIN — CLOPIDOGREL BISULFATE 75 MG: 75 TABLET ORAL at 11:04

## 2022-02-01 RX ADMIN — ACETAMINOPHEN 650 MG: 325 TABLET, FILM COATED ORAL at 11:05

## 2022-02-01 NOTE — PHYSICAL THERAPY NOTE
PT TREATMENT     02/01/22 1530   PT Last Visit   PT Visit Date 02/01/22   Note Type   Note Type Treatment   Pain Assessment   Pain Assessment Tool 0-10   Pain Score No Pain   Restrictions/Precautions   Braces or Orthoses TLSO   Other Precautions Fall Risk   General   Chart Reviewed Yes   Subjective   Subjective I need a few minutes when I for sit up"   Bed Mobility   Supine to Sit 5  Supervision   Additional items Verbal cues; Bedrails   Additional Comments Pt sat on edge of bed 4-5 minutes before initiating transfers and ambulation  Pt needs assist to don TLSO  Transfers   Sit to Stand 5  Supervision   Additional items Verbal cues   Stand to Sit 5  Supervision   Additional items Verbal cues   Ambulation/Elevation   Gait pattern Foward flexed  (Pt tends to hold RW out too far ahead)   Gait Assistance   (Min assist/close S)   Additional items Assist x 1;Verbal cues; Tactile cues   Assistive Device Rolling walker   Distance 80 ft x 2 reps with multiple change in direction and extended rest between ambulation trials due to pt reporting fatigued and feeling weak in his legs   Balance   Static Sitting Good   Static Standing Fair  (With RW)   Dynamic Standing Fair  (With RW)   Ambulatory   (F to occasional F-with RW)   Activity Tolerance   Activity Tolerance Patient limited by fatigue;Treatment limited secondary to medical complications (Comment)  (Weakness lower extremities; deconditioning)   Assessment   Assessment Pt is noted with improved bed mobility, transfers, ambulation with a RW, balance and gait endurance  Pt deferred BLE exercises due to fatigue  Pt also deferred out of bed to chair  PT educated pt on the importance of increasing his mobility daily and benefits of out of bed to chair  Pt will continue to benefit from skilled physical therapy services to increase his strength, gait and functional mobility  The patient's AM-PAC Basic Mobility Inpatient Short Form Raw Score is 17   A Raw score of greater than 16 suggests the patient may benefit from discharge to home  Please also refer to the recommendation of the Physical Therapist for safe discharge planning  Plan   Treatment/Interventions ADL retraining;Functional transfer training;LE strengthening/ROM; Elevations; Therapeutic exercise; Endurance training;Patient/family training;Equipment eval/education; Bed mobility;Gait training; Compensatory technique education   PT Frequency Other (Comment)  (5x/wk)   Recommendation   PT Discharge Recommendation Home with home health rehabilitation   [address] Mobility Inpatient   Turning in Bed Without Bedrails 3   Lying on Back to Sitting on Edge of Flat Bed 3   Moving Bed to Chair 3   Standing Up From Chair 3   Walk in Room 3   Climb 3-5 Stairs 2   Basic Mobility Inpatient Raw Score 17   Basic Mobility Standardized Score 39 67   Highest Level Of Mobility   JH-HLM Goal 5: Stand one or more mins   JH-HLM Highest Level of Mobility 7: Walk 25 feet or more   JH-HLM Goal Achieved Yes   Education   Education Provided Mobility training;Assistive device; Other  (TLSO wearing)   End of Consult   Patient Position at End of Consult Seated edge of bed; All needs within reach   [facility] Insurance Number  Olevia Ranks PT [id number]     Portions of the documentation may have been created using voice recognition software  Occasional wrong word or sound alike substitutions may have occurred due to the inherent limitation of the voice recognition software  Read the chart carefully and recognize, using context, where substitutions have occurred

## 2022-02-01 NOTE — PLAN OF CARE
Problem: MOBILITY - ADULT  Goal: Maintain or return to baseline ADL function  Description: INTERVENTIONS:  -  Assess patient's ability to carry out ADLs; assess patient's baseline for ADL function and identify physical deficits which impact ability to perform ADLs (bathing, care of mouth/teeth, toileting, grooming, dressing, etc )  - Assess/evaluate cause of self-care deficits   - Assess range of motion  - Assess patient's mobility; develop plan if impaired  - Assess patient's need for assistive devices and provide as appropriate  - Encourage maximum independence but intervene and supervise when necessary  - Involve family in performance of ADLs  - Assess for home care needs following discharge   - Consider OT consult to assist with ADL evaluation and planning for discharge  - Provide patient education as appropriate  Outcome: Progressing     Problem: METABOLIC, FLUID AND ELECTROLYTES - ADULT  Goal: Electrolytes maintained within normal limits  Description: INTERVENTIONS:  - Monitor labs and assess patient for signs and symptoms of electrolyte imbalances  - Administer electrolyte replacement as ordered  - Monitor response to electrolyte replacements, including repeat lab results as appropriate  - Instruct patient on fluid and nutrition as appropriate  Outcome: Progressing     Problem: METABOLIC, FLUID AND ELECTROLYTES - ADULT  Goal: Fluid balance maintained  Description: INTERVENTIONS:  - Monitor labs   - Monitor I/O and WT  - Instruct patient on fluid and nutrition as appropriate  - Assess for signs & symptoms of volume excess or deficit  Outcome: Progressing

## 2022-02-01 NOTE — PLAN OF CARE
Problem: PHYSICAL THERAPY ADULT  Goal: Performs mobility at highest level of function for planned discharge setting  See evaluation for individualized goals  Outcome: Progressing  Note: Prognosis: Good  Problem List: Decreased strength,Decreased endurance,Impaired balance,Decreased mobility,Decreased range of motion,Pain,Orthopedic restrictions  Assessment: Pt is noted with improved bed mobility, transfers, ambulation with a RW, balance and gait endurance  Pt deferred BLE exercises due to fatigue  Pt also deferred out of bed to chair  PT educated pt on the importance of increasing his mobility daily and benefits of out of bed to chair  Pt will continue to benefit from skilled physical therapy services to increase his strength, gait and functional mobility  PT Discharge Recommendation: Home with home health rehabilitation          See flowsheet documentation for full assessment

## 2022-02-01 NOTE — PROGRESS NOTES
NEPHROLOGY PROGRESS NOTE    Jerry Ch 57 y o  male MRN: [id number]  Unit/Bed#: 24128 Marilyn Kimball Encounter: [id number]  Reason for Consult:  End-stage renal disease    Patient is awake alert had dialysis yesterday states that legs feel little less swollen  Seems to be in a better mood today a little more upbeat after our discussion  He is for dialysis tomorrow  Still having back pain and anticipating going to rehab  ASSESSMENT/PLAN:  1  Renal    The patient has advanced chronic kidney disease that started many years ago as I looked over the records and had abnormal renal function with heavy proteinuria at least 5 years ago and likely longer  Renal function has progressed and he was admitted with creatinine of 8 he has severe proteinuria all likely related to severe diabetic nephropathy  Given that serologies were negative and the chronic duration of the disease and a non active urine sediment I do not feel after discussion with the patient that a renal biopsy would   Yesterday the patient really did he want to do it could he states he would have been able to lay flat and was concerned  For now I do not see any indication that would help with treatment and he does understand he is on chronic dialysis  I spoke to him about trying to get on a transplant list eventually and can discuss this at his dialysis center  He is on phosphate binders  Can discontinue IV Lasix  I spoke to Case Management and he is likely going to be discharged tomorrow it will undergo dialysis Tuesday Thursday Saturday as an outpatient  Given that he was done yesterday will do a treatment tomorrow in the morning and then he can initiate dialysis on that schedule  He Epogen was not initiated as he had history of TIA he likely will be able to get this but will leave it to his outpatient clinic  He has anemia related to chronic kidney disease  Continue to reduce dry weight as tolerated      2  T10 vertebral fracture  Patient for PT OT     SUBJECTIVE:  Review of Systems   Constitutional: Positive for malaise/fatigue  Negative for chills, diaphoresis, fever and night sweats  HENT: Negative  Eyes: Negative  Cardiovascular: Positive for leg swelling  Negative for chest pain, dyspnea on exertion, orthopnea and palpitations  Feels less swelling in his legs  Respiratory: Negative for cough, shortness of breath, sputum production and wheezing  Musculoskeletal: Positive for back pain  Gastrointestinal: Negative  Negative for abdominal pain, diarrhea, nausea and vomiting  Genitourinary: Negative for dysuria, flank pain, hematuria and incomplete emptying  Neurological: Positive for weakness  Negative for dizziness, focal weakness and headaches  Psychiatric/Behavioral: Negative for altered mental status, depression, hallucinations and hypervigilance  OBJECTIVE:  Current Weight: Weight - Scale: 130 kg (286 lb 9 6 oz)  Vitals:Temp (24hrs), Av 6 °F (36 4 °C), Min:97 4 °F (36 3 °C), Max:98 °F (36 7 °C)  Current: Temperature: 97 8 °F (36 6 °C)   Blood pressure 138/89, pulse 69, temperature 97 8 °F (36 6 °C), temperature source Oral, resp  rate 18, height 6' 1" (1 854 m), weight 130 kg (286 lb 9 6 oz), SpO2 92 %  , Body mass index is 37 81 kg/m²  Intake/Output Summary (Last 24 hours) at 2022 1210  Last data filed at 2022 0300  Gross per 24 hour   Intake 1050 ml   Output 4700 ml   Net -3650 ml       Physical Exam: /89 (BP Location: Left arm)   Pulse 69   Temp 97 8 °F (36 6 °C) (Oral)   Resp 18   Ht 6' 1" (1 854 m)   Wt 130 kg (286 lb 9 6 oz)   SpO2 92%   BMI 37 81 kg/m²   Physical Exam  Constitutional:       General: He is not in acute distress  Appearance: He is not toxic-appearing or diaphoretic  HENT:      Head: Normocephalic and atraumatic  Mouth/Throat:      Mouth: Mucous membranes are moist       Pharynx: Oropharynx is clear     Eyes:      General: No scleral icterus  Extraocular Movements: Extraocular movements intact  Cardiovascular:      Rate and Rhythm: Normal rate and regular rhythm  Heart sounds: No friction rub  No gallop  Comments: Positive lymphedema  Pulmonary:      Effort: Pulmonary effort is normal  No respiratory distress  Breath sounds: Normal breath sounds  No wheezing, rhonchi or rales  Abdominal:      General: Bowel sounds are normal  There is no distension  Palpations: Abdomen is soft  Tenderness: There is no abdominal tenderness  There is no rebound  Musculoskeletal:      Cervical back: Normal range of motion and neck supple  Skin:     Comments: Dry scaly skin lower extremities  Neurological:      General: No focal deficit present  Mental Status: He is alert and oriented to person, place, and time  Mental status is at baseline  Psychiatric:         Mood and Affect: Mood normal          Behavior: Behavior normal          Thought Content:  Thought content normal          Judgment: Judgment normal          Medications:    Current Facility-Administered Medications:     acetaminophen (TYLENOL) tablet 650 mg, 650 mg, Oral, Q6H KEMAL, Lore Silver DO, 650 mg at 02/01/22 1105    allopurinol (ZYLOPRIM) tablet 100 mg, 100 mg, Oral, Daily, Darline Mai MD, 100 mg at 02/01/22 0807    atorvastatin (LIPITOR) tablet 80 mg, 80 mg, Oral, Daily, Darline Mai MD, 80 mg at 02/01/22 0807    carvedilol (COREG) tablet 12 5 mg, 12 5 mg, Oral, BID, Darline Mai MD, 12 5 mg at 02/01/22 0807    clopidogrel (PLAVIX) tablet 75 mg, 75 mg, Oral, Daily, Lore Silver DO, 75 mg at 02/01/22 1104    furosemide (LASIX) injection 80 mg, 80 mg, Intravenous, Q8H, Ritesh Mabry MD, 80 mg at 02/01/22 0807    HYDROmorphone (DILAUDID) injection 0 5 mg, 0 5 mg, Intravenous, Q4H PRN, Lore Silver DO, 0 5 mg at 02/01/22 1104    HYDROmorphone (DILAUDID) injection 1 mg, 1 mg, Intravenous, Once, [facility] MD Sha    insulin lispro (HumaLOG) 100 units/mL subcutaneous injection 1-5 Units, 1-5 Units, Subcutaneous, HS, Grisel Shi MD, 1 Units at 01/29/22 2132    insulin lispro (HumaLOG) 100 units/mL subcutaneous injection 2-12 Units, 2-12 Units, Subcutaneous, TID AC, 2 Units at 02/01/22 1105 **AND** Fingerstick Glucose (POCT), , , TID AC, Grisel Shi MD    lidocaine (LIDODERM) 5 % patch 1 patch, 1 patch, Topical, Daily, Grisel Shi MD, 1 patch at 02/01/22 0807    NIFEdipine (PROCARDIA XL) 24 hr tablet 30 mg, 30 mg, Oral, BID, Lukasz Wilde MD, 30 mg at 02/01/22 0807    ondansetron (ZOFRAN) injection 4 mg, 4 mg, Intravenous, Q6H PRN, Grisel Shi MD    oxyCODONE (ROXICODONE) IR tablet 2 5 mg, 2 5 mg, Oral, Q6H PRN, Grisel Shi MD    oxyCODONE (ROXICODONE) IR tablet 5 mg, 5 mg, Oral, Q6H PRN, Lore Silver DO, 5 mg at 02/01/22 0807    sevelamer (RENAGEL) tablet 800 mg, 800 mg, Oral, TID With Meals, Brittaney Hernández MD, 800 mg at 02/01/22 1104    Laboratory Results:  Lab Results   Component Value Date    WBC 9 08 01/31/2022    HGB 7 9 (L) 02/01/2022    HCT 25 0 (L) 02/01/2022    MCV 94 01/31/2022     01/31/2022     Lab Results   Component Value Date    SODIUM 136 02/01/2022    K 3 9 02/01/2022    CL 99 (L) 02/01/2022    CO2 30 02/01/2022    BUN 31 (H) 02/01/2022    CREATININE 4 33 (H) 02/01/2022    GLUC 153 (H) 02/01/2022    CALCIUM 8 2 (L) 02/01/2022     Lab Results   Component Value Date    CALCIUM 8 2 (L) 02/01/2022    PHOS 5 9 (H) 01/29/2022     No results found for: LABPROT

## 2022-02-01 NOTE — PROGRESS NOTES
[address] Internal Medicine Progress Note  Patient: Jake Haley 57 y o  male   MRN: [id number]  PCP: San Goodpasture, MD  Unit/Bed#: [address] [place] [address] Encounter: [id number]  Date Of Visit: 02/01/22    Problem List:    Principal Problem:    Acute kidney injury superimposed on chronic kidney disease ([facility] )  Active Problems:    T10 vertebral fracture ([facility] )    Diabetes mellitus ([facility] )    Hypertension    TIA (transient ischemic attack)    Anemia      Assessment & Plan:    * Acute kidney injury superimposed on chronic kidney disease [facility])  Assessment & Plan  Lab Results   Component Value Date    EGFR 13 02/01/2022    EGFR 9 01/31/2022    EGFR 10 01/30/2022    CREATININE 4 33 (H) 02/01/2022    CREATININE 6 00 (H) 01/31/2022    CREATININE 5 57 (H) 01/30/2022     Patient has history of CKD stage IV  Baseline creatinine around 4 3-4 7  Admission creatinine was 5 79  Patient also noted to have significant anasarca with minimally elevated potassium at 5 1  · Etiology suspected to be acute kidney injury related to possible cardiorenal syndrome, use of NSAIDs or progression of CKD  · Patient's previous SPEP/UPEP were negative and chronic hepatitis panel was negative  C3-C4 not low and NURIA was negative  · GBM antibodies and ANCA panel negative  · CT abdomen/pelvis 1/23 showed no hydronephrosis or urinary tract calculus  · UA showed trace amount of blood and protein  · Per nephrology, discontinued Actos as it can cause fluid retention  · Kidney biopsy was planned for 1/31 but after discussion between pt and Nephrology, it was canceled  · Tunneled dialysis catheter placed 1/27; continue to monitor site for drainage/oozing  · Sodium bicarb tablets and IV albumin 25g Q12h discontinued 1/28  · Lasix was increased to 80mg IV Q8h; discontinued today per Nephrology  · Patient received hemodialysis 1/28 and 1/31  · Cr improving;  Cr peaked 1/27 at 8 19 --> 4 33 today  · Patient to continue dialysis as outpatient on Tuesday Thursday Saturday regimen per Nephrology   · Discharge to rehab facility planned for tomorrow after dialysis session; COVID screening negative 2/1    T10 vertebral fracture [facility])  Assessment & Plan  Patient reported severe low back pain x1 week prior to admission after slipping on a rug  Denies any bowel or bladder incontinence, tingling or numbness in the legs  · Patient received fentanyl and 2 doses of Dilaudid in the ED with persistent pain  · Lumbar x-ray showed no fractures or malalignment with possible ankylosing spondylitis  · CT abdomen/pelvis 1/23 showed minimally displaced oblique fracture of the T10 vertebral body without extending into the posterior elements without any real bony retropulsion  · Thoracic spine x-ray 1/24 does not visualize fracture well  · Dr Saniya Dunaway discussed with Neurosurgery Dr Jeanie Ye - recommending TSLO brace and outpatient follow-up with neurosurgery  · Continue acetaminophen 650 mg Q6h and Lidoderm 5% patch daily for pain management  · Continue oxycodone 5mg along with 2 mg Q6h PRN and Dilaudid 0 5mg Q4h PRN breakthrough pain    Diabetes mellitus [facility])  Assessment & Plan  Lab Results   Component Value Date    HGBA1C 6 6 (H) 01/24/2022       Recent Labs     01/31/22  1137 01/31/22  1600 01/31/22  2044 02/01/22  0707   POCGLU 116 108 145* 107     Holding home linagliptin and Actos  Continue Humalog sliding scale with Accu-Cheks q a c  and HS  Blood sugars remain stable      ESRD (end stage renal disease) [facility])  Assessment & Plan  Lab Results   Component Value Date    EGFR 9 01/31/2022    EGFR 10 01/30/2022    EGFR 8 01/29/2022    CREATININE 6 00 (H) 01/31/2022    CREATININE 5 57 (H) 01/30/2022    CREATININE 6 59 (H) 01/29/2022       Anemia  Assessment & Plan  Results from last 7 days   Lab Units 02/01/22  1120 01/31/22  0534 01/29/22  0539 01/27/22  0455 01/26/22  0532   HEMOGLOBIN g/dL 7 9* 7 5* 7 8* 8 0* 7 9*   HEMATOCRIT % 25 0* 23 9* 24 4* 24 7* 25 2*   MCV fL  --  94 92 93 93 Patient's hemoglobin dropped from 10-7 8  MCV normal   Baseline hemoglobin appears to be 8-8 5  Admission hemoglobin level of 10 questionable for accuracy  No evidence of active bleeding  Iron panel showed iron level of 34, TIBC of 163 and ferritin level of 61 suggesting iron deficiency-pending stool occult blood  Transfuse PRBC if hemoglobin drops < 7  Given his history of TIA to avoid SURINDER for now  Hemoglobin remains stable in the 7s  TIA (transient ischemic attack)  Assessment & Plan  Continue home statin  Plavix was held in preparation for kidney biopsy 1/31  Can resume Plavix    Hypertension  Assessment & Plan  Continue home nifedipine 30 mg PO BID and Coreg 12 5 mg PO BID with holding parameters    Diarrhea-resolved as of 1/24/2022  Assessment & Plan  Patient complained of some diarrhea after admission  Diarrhea has since resolved  Stool for bacterial panel and C diff toxin were negative  CT scan of the abdomen pelvis showed no acute pathology  Abdominal exam continues to be benign  VTE Pharmacologic Prophylaxis:   heparin    Patient Centered Rounds: I performed bedside rounds with nursing staff today  Discussions with Specialists or Other Care Team Provider: yes - renal    Education and Discussions with Family / Patient: yes    Time Spent for Care: 30 minutes  More than 50% of total time spent on counseling and coordination of care as described above  Current Length of Stay: 11 day(s)  Current Patient Status: Inpatient   Certification Statement: The patient will continue to require additional inpatient hospital stay due to waiting transfer to rehab; COVID test pending    Discharge Plan: Anticipate discharge tomorrow to rehab facility  Code Status: Level 1 - Full Code    Subjective:   Patient without new or worsening complaints today  He is amenable to continuing dialysis outpatient and is requesting to hold transfer to rehab off until tomorrow   Pain controlled with analgesic regimen  Denies pain/drainage to dialysis catheter site  Objective:     Vitals:   Temp (24hrs), Av 6 °F (36 4 °C), Min:97 4 °F (36 3 °C), Max:98 °F (36 7 °C)    Temp:  [97 4 °F (36 3 °C)-98 °F (36 7 °C)] 97 8 °F (36 6 °C)  HR:  [62-76] 69  Resp:  [16-19] 18  BP: (135-157)/(56-89) 138/89  SpO2:  [92 %-97 %] 92 %  Body mass index is 37 81 kg/m²  Input and Output Summary (last 24 hours): Intake/Output Summary (Last 24 hours) at 2022 1110  Last data filed at 2022 0300  Gross per 24 hour   Intake 1050 ml   Output 4700 ml   Net -3650 ml       Physical Exam:   Physical Exam  Constitutional:       General: He is not in acute distress  Appearance: He is obese  He is not toxic-appearing  HENT:      Head: Normocephalic and atraumatic  Eyes:      Conjunctiva/sclera: Conjunctivae normal    Cardiovascular:      Rate and Rhythm: Normal rate and regular rhythm  Pulmonary:      Effort: Pulmonary effort is normal  No respiratory distress  Breath sounds: No wheezing or rales  Chest:      Comments: + tunneled dialysis catheter covered with occlusive dressing  No purrulent drainage or erythema noted  Abdominal:      General: Bowel sounds are normal  There is no distension  Palpations: Abdomen is soft  Tenderness: There is no abdominal tenderness  Musculoskeletal:      Right lower leg: Edema present  Left lower leg: Edema present  Comments: Lower extremity edema improved   Neurological:      Mental Status: He is alert and oriented to person, place, and time           Additional Data:     Labs:  Results from last 7 days   Lab Units 22  0534   WBC Thousand/uL 9 08   HEMOGLOBIN g/dL 7 5*   HEMATOCRIT % 23 9*   PLATELETS Thousands/uL 277   NEUTROS PCT % 58   LYMPHS PCT % 23   MONOS PCT % 11   EOS PCT % 7*     Results from last 7 days   Lab Units 22  0534   SODIUM mmol/L 138   POTASSIUM mmol/L 4 2   CHLORIDE mmol/L 103   CO2 mmol/L 25   BUN mg/dL 57*   CREATININE mg/dL 6 00*   ANION GAP mmol/L 10   CALCIUM mg/dL 7 8*   GLUCOSE RANDOM mg/dL 118     Results from last 7 days   Lab Units 01/30/22  1339   INR  1 02     Results from last 7 days   Lab Units 02/01/22  1100 02/01/22  0707 01/31/22  2044 01/31/22  1600 01/31/22  1137 01/31/22  0741 01/30/22  2200 01/30/22  1624 01/30/22  1128 01/30/22  0816 01/29/22  2043 01/29/22  1558   POC GLUCOSE mg/dl 162* 107 145* 108 116 122 130 162* 134 137 157* 124               Lines/Drains:  Invasive Devices  Report    Peripheral Intravenous Line            Peripheral IV 01/30/22 Dorsal (posterior); Left Forearm 2 days          Hemodialysis Catheter            HD Permanent Double Catheter 4 days                      Imaging: No pertinent imaging reviewed      Recent Cultures (last 7 days):         Last 24 Hours Medication List:   Current Facility-Administered Medications   Medication Dose Route Frequency Provider Last Rate    acetaminophen  650 mg Oral Q6H [address] Lore Silver,       allopurinol  100 mg Oral Daily Naima Franco MD      atorvastatin  80 mg Oral Daily Naima Franco MD      carvedilol  12 5 mg Oral BID Naima Franco MD      clopidogrel  75 mg Oral Daily Lore Silver,       furosemide  80 mg Intravenous Q8H Fili Molina MD      HYDROmorphone  0 5 mg Intravenous Q4H PRN Lore Silver,       HYDROmorphone  1 mg Intravenous Once Naima Franco MD      insulin lispro  1-5 Units Subcutaneous HS Naima Franco MD      insulin lispro  2-12 Units Subcutaneous TID AC Joel Dalton MD      lidocaine  1 patch Topical Daily Naima Franco MD      NIFEdipine  30 mg Oral BID Gloria Teresa MD      ondansetron  4 mg Intravenous Q6H PRN Naima Franco MD      oxyCODONE  2 5 mg Oral Q6H PRN Naima Franco MD      oxyCODONE  5 mg Oral Q6H PRN Lore Silver DO      sevelamer  800 mg Oral TID With Meals Fili Molina MD          Today, Patient Was Seen By: Anat Leonardo DO    ** Please Note: "This note has been constructed using a voice recognition system  Therefore there may be syntax, spelling, and/or grammatical errors   Please call if you have any questions  "**

## 2022-02-02 ENCOUNTER — APPOINTMENT (INPATIENT)
Dept: DIALYSIS | Facility: HOSPITAL | Age: 62
DRG: 674 | End: 2022-02-02
Payer: MEDICARE

## 2022-02-02 VITALS
RESPIRATION RATE: 18 BRPM | BODY MASS INDEX: 37.98 KG/M2 | HEIGHT: 73 IN | WEIGHT: 286.6 LBS | TEMPERATURE: 97.5 F | OXYGEN SATURATION: 97 % | SYSTOLIC BLOOD PRESSURE: 127 MMHG | HEART RATE: 59 BPM | DIASTOLIC BLOOD PRESSURE: 60 MMHG

## 2022-02-02 PROBLEM — N18.6 ESRD (END STAGE RENAL DISEASE) (HCC): Status: ACTIVE | Noted: 2022-01-21

## 2022-02-02 LAB
ANION GAP SERPL CALCULATED.3IONS-SCNC: 10 MMOL/L (ref 4–13)
BUN SERPL-MCNC: 41 MG/DL (ref 5–25)
CALCIUM SERPL-MCNC: 8.1 MG/DL (ref 8.3–10.1)
CHLORIDE SERPL-SCNC: 101 MMOL/L (ref 100–108)
CO2 SERPL-SCNC: 25 MMOL/L (ref 21–32)
CREAT SERPL-MCNC: 5.13 MG/DL (ref 0.6–1.3)
GFR SERPL CREATININE-BSD FRML MDRD: 11 ML/MIN/1.73SQ M
GLUCOSE SERPL-MCNC: 101 MG/DL (ref 65–140)
GLUCOSE SERPL-MCNC: 110 MG/DL (ref 65–140)
GLUCOSE SERPL-MCNC: 113 MG/DL (ref 65–140)
Lab: NORMAL
MISCELLANEOUS LAB TEST RESULT: NORMAL
POTASSIUM SERPL-SCNC: 4.2 MMOL/L (ref 3.5–5.3)
SODIUM SERPL-SCNC: 136 MMOL/L (ref 136–145)

## 2022-02-02 PROCEDURE — 90935 HEMODIALYSIS ONE EVALUATION: CPT | Performed by: INTERNAL MEDICINE

## 2022-02-02 PROCEDURE — 82948 REAGENT STRIP/BLOOD GLUCOSE: CPT

## 2022-02-02 PROCEDURE — 99239 HOSP IP/OBS DSCHRG MGMT >30: CPT | Performed by: FAMILY MEDICINE

## 2022-02-02 PROCEDURE — 80048 BASIC METABOLIC PNL TOTAL CA: CPT | Performed by: FAMILY MEDICINE

## 2022-02-02 RX ORDER — OXYCODONE HYDROCHLORIDE 5 MG/1
5 TABLET ORAL EVERY 6 HOURS PRN
Refills: 0 | Status: ON HOLD
Start: 2022-02-02 | End: 2022-07-14 | Stop reason: SDUPTHER

## 2022-02-02 RX ORDER — OXYCODONE HYDROCHLORIDE 5 MG/1
2.5 TABLET ORAL EVERY 6 HOURS PRN
Refills: 0
Start: 2022-02-02 | End: 2022-07-14

## 2022-02-02 RX ORDER — LIDOCAINE 50 MG/G
1 PATCH TOPICAL DAILY
Refills: 0
Start: 2022-02-03

## 2022-02-02 RX ORDER — ACETAMINOPHEN 325 MG/1
650 TABLET ORAL EVERY 6 HOURS PRN
Refills: 0 | Status: ON HOLD
Start: 2022-02-02 | End: 2022-07-31 | Stop reason: SDUPTHER

## 2022-02-02 RX ORDER — SEVELAMER HYDROCHLORIDE 800 MG/1
800 TABLET, FILM COATED ORAL
Refills: 0
Start: 2022-02-02

## 2022-02-02 RX ADMIN — ATORVASTATIN CALCIUM 80 MG: 80 TABLET, FILM COATED ORAL at 11:53

## 2022-02-02 RX ADMIN — CLOPIDOGREL BISULFATE 75 MG: 75 TABLET ORAL at 11:53

## 2022-02-02 RX ADMIN — HEPARIN SODIUM 5000 UNITS: 5000 INJECTION INTRAVENOUS; SUBCUTANEOUS at 06:06

## 2022-02-02 RX ADMIN — OXYCODONE HYDROCHLORIDE 5 MG: 5 TABLET ORAL at 11:53

## 2022-02-02 RX ADMIN — SEVELAMER HYDROCHLORIDE 800 MG: 800 TABLET, FILM COATED PARENTERAL at 11:53

## 2022-02-02 RX ADMIN — HYDROMORPHONE HYDROCHLORIDE 0.5 MG: 1 INJECTION, SOLUTION INTRAMUSCULAR; INTRAVENOUS; SUBCUTANEOUS at 06:06

## 2022-02-02 RX ADMIN — ACETAMINOPHEN 650 MG: 325 TABLET, FILM COATED ORAL at 11:53

## 2022-02-02 RX ADMIN — ACETAMINOPHEN 650 MG: 325 TABLET, FILM COATED ORAL at 06:05

## 2022-02-02 RX ADMIN — OXYCODONE HYDROCHLORIDE 5 MG: 5 TABLET ORAL at 04:05

## 2022-02-02 RX ADMIN — ALLOPURINOL 100 MG: 100 TABLET ORAL at 11:53

## 2022-02-02 NOTE — DISCHARGE SUMMARY
Discharge Summary - [address] [facility]    Patient Information: Rosi Levin 57 y o  male MRN: [id number]  [id number]    Discharging Physician / Practitioner: Jessenia Tatum DO  PCP: Altagracia Hernandez MD  Admission Date: 1/21/2022  Discharge Date: 02/02/22    Reason for Admission: Fall (pt reports a fall a week ago  Pt reports bilatv lower leg swelling that worsened after the fall  Pt reports scrotum started to swell 2 days ago  Pt reports lower back pain since the fall   Pt denies any difficuty urinating   ) and Back Pain      Discharge Diagnoses:     Principal Problem:    ESRD (end stage renal disease) ([facility] [address] )  Active Problems:    T10 vertebral fracture ([facility] )    Diabetes mellitus ([facility] )    Hypertension    TIA (transient ischemic attack)    Anemia  Resolved Problems:    Diarrhea        * ESRD (end stage renal disease) [facility])  Assessment & Plan  Lab Results   Component Value Date    EGFR 11 02/02/2022    EGFR 13 02/01/2022    EGFR 9 01/31/2022    CREATININE 5 13 (H) 02/02/2022    CREATININE 4 33 (H) 02/01/2022    CREATININE 6 00 (H) 01/31/2022     Patient has history of CKD stage IV  Initially came in with [place] on CKD now hemodialysis dependent  Baseline creatinine around 4 3-4 7  Admission creatinine was 5 79  Patient also noted to have significant anasarca with minimally elevated potassium at 5 1  · Etiology suspected to be acute kidney injury related to possible cardiorenal syndrome, use of NSAIDs or progression of CKD  · Patient's previous SPEP/UPEP were negative and chronic hepatitis panel was negative  C3-C4 not low and NURIA was negative  · GBM antibodies and ANCA panel negative  · CT abdomen/pelvis 1/23 showed no hydronephrosis or urinary tract calculus  · UA showed trace amount of blood and protein  · Per nephrology, discontinued Actos as it can cause fluid retention    · Kidney biopsy was planned for 1/31 but after discussion between pt and Nephrology, it was canceled  · Tunneled dialysis catheter placed 1/27; Site monitored for drainage/oozing  · Sodium bicarb tablets and IV albumin 25g Q12h discontinued 1/28  · Lasix was increased to 80mg IV Q8h; discontinued 2/1 per Nephrology  No indication for p o  Diuretic on discharge for Nephrology  · Patient received hemodialysis 1/28, 1/31, and 2/2  · Cr improving; Cr peaked 1/27 at 8 19 --> 5 13 today  · Patient to continue dialysis as outpatient on Tyeolbf-Pxlhxmxy-Rwreeniv regimen per Nephrology   · COVID screening negative 2/1 for discharge to rehab facility    T10 vertebral fracture [facility])  Assessment & Plan  Patient reported severe low back pain x1 week prior to admission after slipping on a rug  He denies any bowel or bladder incontinence, tingling or numbness in the legs  · Patient received fentanyl and 2 doses of Dilaudid in the ED with persistent pain  · Lumbar x-ray showed no fractures or malalignment with possible ankylosing spondylitis  · CT abdomen/pelvis 1/23 showed minimally displaced oblique fracture of the T10 vertebral body without extending into the posterior elements without any real bony retropulsion  · Thoracic spine x-ray 1/24 does not visualize fracture well  · Dr Mary Combs discussed with Neurosurgery Dr Mindi Velasco - recommending TSLO brace and outpatient follow-up with neurosurgery  · Continue acetaminophen 650 mg Q6h prn, Lidoderm 5% patch daily for pain management  · Continue oxycodone 5mg along with 2 mg Q6h PRN and Dilaudid 0 5mg Q4h PRN breakthrough pain  Diabetes mellitus [facility])  Assessment & Plan  Lab Results   Component Value Date    HGBA1C 6 6 (H) 01/24/2022       Recent Labs     02/01/22  1100 02/01/22  1617 02/01/22  2048 02/02/22  0659   POCGLU 162* 151* 162* 113     Held home linagliptin and Actos during hospitalization    Restart linagliptin on discharge  Blood sugars remained stable with Humalog sliding scale with Accu-Cheks q a c  and HS      Anemia  Assessment & Plan  Results from last 7 days   Lab Units 02/01/22  1120 01/31/22  0534 01/29/22  0539 01/27/22  0455   HEMOGLOBIN g/dL 7 9* 7 5* 7 8* 8 0*   HEMATOCRIT % 25 0* 23 9* 24 4* 24 7*   MCV fL  --  94 92 93     Patient's hemoglobin dropped from 10-7 8  MCV normal   Baseline hemoglobin appears to be 8-8 5  Admission hemoglobin level of 10 questionable for accuracy  · No evidence of active bleeding  · Iron panel showed iron level of 34, TIBC of 163 and ferritin level of 61 suggesting iron deficiency  · Avoided SURINDER during hospitalization due to his history of TIA, but may be required after discharge per Nephrology  · Hemoglobin has remained stable in the 7s during hospital stay  · Repeat lab work after discharge at rehab facility    TIA (transient ischemic attack)  Assessment & Plan  Continue home statin  Plavix was held in preparation for kidney biopsy 1/31  Plavix resumed    Hypertension  Assessment & Plan  Continue home nifedipine 30 mg PO BID and Coreg 12 5 mg PO BID with holding parameters    Diarrhea-resolved as of 1/24/2022  Assessment & Plan  Patient complained of some diarrhea after admission  Diarrhea has since resolved  · Stool for bacterial panel and C diff toxin were negative  · CT scan of the abdomen pelvis showed no acute pathology  · Abdominal exam has been benign        Consultations During Hospital Stay:  [address] TO IR  INPATIENT CONSULT TO IR  IP CONSULT TO CASE MANAGEMENT    Procedures Performed:     · IR Tunneled dialysis catheter placement - 1/27/22  · Hemodialysis - 1/28/22, 1/31/22, and 2/2/22    Significant Findings:     · Refer to hospital course and above listed diagnosis related plan for details    Imaging while in hospital:    CT abdomen pelvis wo contrast    Result Date: 1/23/2022  Narrative: CT ABDOMEN AND PELVIS WITHOUT IV CONTRAST INDICATION:   Kidney failure, acute;  renal failure    Freddy Laureano is a 57 y o  male with a PMH of diabetes mellitus, hypertension, hyperlipidemia, CKD who presents with back pain for the past 1 week which was particularly bad since yesterday  Patient reported that his slipped on a rug and sustained accidental fall about a week ago  Patient has been having lower back pain since then without any radiation  Patient denies any tingling or numbness in the legs or bowel or bladder incontinence  Patient also reported leg swelling which is getting worse  over the past 1 week  Patient has known history of CKD but did not follow-up with Nephrology other than in the hospital   Patient denies any chest pain, shortness of breath, abdominal pain, nausea vomiting In the ED, patient noted to have significant elevation of 215/91 which improved to 172/92  Workup showed a BUN creatinine of 63 and 5 7 with an albumin level of 2 3 with proBNP of 1800 The patient's entire past medical history was obtained directly from the attending history and physical in [facility]  The information was copied and pasted directly from Epic  COMPARISON:  None  TECHNIQUE:  CT examination of the abdomen and pelvis was performed without intravenous contrast   Axial, sagittal, and coronal 2D reformatted images were created from the source data and submitted for interpretation  Radiation dose length product (DLP) for this visit:  1189 98 mGy-cm   This examination, like all CT scans performed in the [facility], was performed utilizing techniques to minimize radiation dose exposure, including the use of iterative reconstruction and automated exposure control  Enteric contrast was not administered  FINDINGS: ABDOMEN LOWER CHEST:  There are small bilateral pleural effusions, incompletely visualized, right side larger than left  Atelectasis in the lower lobes of the lungs bilaterally  The heart is enlarged  Mild bilateral gynecomastia  LIVER/BILIARY TREE:  The liver is enlarged with fatty infiltrative changes  GALLBLADDER:  The gallbladder is distended    Increased density within the gallbladder, likely hyperdense sludge  No evidence of gallbladder wall thickening or pericholecystic fluid  SPLEEN:  Unremarkable  PANCREAS:  Unremarkable  ADRENAL GLANDS:  Unremarkable  KIDNEYS/URETERS:  No hydronephrosis or urinary tract calculus  One or more sharply circumscribed subcentimeter renal hypodensities are present, too small to accurately characterize, and statistically most likely benign findings  According to recent literature (Radiology 2019) no further workup of these findings is recommended  STOMACH AND BOWEL:  Evaluation of the GI tract is limited due to lack of oral contrast material  The stomach is distended and filled with ingested food products and air  Hiatal hernia  Thickening of the distal esophagus, likely reflux esophagitis  No evidence of small bowel obstruction  The colon is segmentally distended with feces  Normal fecal burden throughout the colon  Scattered diverticula in the descending colon and sigmoid colon  Nothing to suggest acute diverticulitis  APPENDIX:  No findings to suggest appendicitis  ABDOMINOPELVIC CAVITY:  No ascites  No pneumoperitoneum  There are mildly enlarged retroperitoneal, mesenteric and pelvic lymph nodes  VESSELS:  Atherosclerotic changes are present  No evidence of aneurysm  PELVIS REPRODUCTIVE ORGANS:  The prostate is enlarged  URINARY BLADDER:  Mild circumferential bladder wall thickening, likely the sequela of mild bladder outlet obstruction from prostatic enlargement  ABDOMINAL WALL/INGUINAL REGIONS:  Mild diastases of the rectus abdominis musculature  Small umbilical hernia containing fat  Small infraumbilical ventral abdominal wall hernia containing fat  Mild and diffuse anasarca  OSSEOUS STRUCTURES:  The osseous structures are demineralized  There is a minimally displaced, oblique fracture involving the T10 vertebral body (series 601, image 131; series 602, image 108)    Fracture line extends from the superior, left lateral aspect of the T10 vertebral body, obliquely to the inferior articular  surface of T10  Fracture line does not appear to extend into the pedicles or facets  There is no bony retropulsion  Disc space narrowing diffusely throughout the lower thoracic and lumbosacral spine  Scattered Schmorl's nodes  Anterior osteophytic spur formation and lateral syndesmophyte formation  Impression: 1  Minimally displaced, oblique fracture involving the T10 vertebral body  Fracture line does not extend into the posterior elements  No bony retropulsion  Follow-up neurosurgical evaluation recommended  2   Additional incidental findings as described above  I personally discussed this study with Haydee Hope on 1/23/2022 at 12:00 PM  Workstation performed: [id number]     XR spine thoracic 3 vw    Result Date: 1/24/2022  Narrative: THORACIC SPINE INDICATION:   T10 compression fracture  COMPARISON:  CT 1/23/2022 VIEWS:  XR SPINE THORACIC 3 VW FINDINGS: The known oblique fracture through the T10 vertebral body identified on CT from 1/23/2022 is not well appreciated on this plain film examination  There appears to be very mild loss of height of the T10 vertebral body (approximately 15%) with slight anterior wedge compression deformity  No obvious osseous retropulsion is appreciated  Mild kyphotic curvature noted in the thoracic spine  Mild multilevel discogenic disease present  There is no displacement of the paraspinal line  The pedicles appear intact  Visualized lungs appear clear  Impression: Known nondisplaced oblique T10 vertebral body fracture not clearly appreciated on this plain film study although there is some mild anterior wedge compression deformity hasn't without appreciable osseous retropulsion  Workstation performed: [id number]     XR spine lumbar 2 or 3 views injury    Result Date: 1/21/2022  Narrative: LUMBAR SPINE INDICATION:   pain   COMPARISON:  None VIEWS:  XR SPINE LUMBAR 2 OR 3 VIEWS INJURY FINDINGS: There are 5 non rib bearing lumbar vertebral bodies  There is no evidence of acute fracture or destructive osseous lesion  Alignment is unremarkable  There is prominent bridging osteophytic bone spur formation along the margins of the lumbar spine with only minimal loss of disc height  Findings could represent ankylosing spondylitis  Correlate with clinical history  The upper portions of the sacroiliac joints are not well defined and could be partially fused  The lower margins seem within normal limits  The pedicles appear intact  There are atherosclerotic calcifications  Soft tissues are otherwise unremarkable  Impression: No fracture or malalignment  Possible ankylosing spondylitis  Workstation performed: [id number]     XR chest 1 view    Result Date: 1/21/2022  Narrative: CHEST INDICATION:   leg edema  COMPARISON:  None EXAM PERFORMED/VIEWS:  XR CHEST 1 VIEW FINDINGS: Cardiomediastinal silhouette appears unremarkable  The lungs are clear  No pneumothorax or pleural effusion  Osseous structures appear within normal limits for patient age  Impression: No acute cardiopulmonary disease  Workstation performed: [id number]     IR tunneled dialysis catheter placement    Result Date: 1/27/2022  Narrative: PROCEDURE: Tunneled dialysis catheter placement Procedural Personnel Attending physician(s): Dr Bekah Aguirre Pre-procedure diagnosis: Chronic kidney disease Post-procedure diagnosis: Same Indication (QCDR): Acute on chronic kidney disease PROCEDURE SUMMARY: - Venous access with ultrasound guidance - Tunneled dialysis catheter insertion with fluoroscopic guidance PROCEDURE DETAILS: Pre-procedure Consent: Informed consent for the procedure including risks, benefits and alternatives was obtained and time-out was performed prior to the procedure   Preparation (MIPS): The site was prepared and draped using all elements of maximal sterile barrier technique including sterile gloves, sterile gown, cap, mask, large sterile sheet, sterile ultrasound probe cover, hand hygiene and cutaneous antisepsis with 2% chlorhexidine  Anesthesia/sedation Level of anesthesia/sedation: Moderate sedation (conscious sedation) Anesthesia/sedation administered by: IR nurse under attending supervision with continuous monitoring of the patient's level of consciousness and physiologic status Total intra-service sedation time (minutes): 20 Access Local anesthesia was administered  The vessel was sonographically evaluated and determined to be patent  Real time ultrasound was used to visualize needle entry into the vessel and a permanent image was stored  Vein accessed (QCDR): Right internal jugular vein Internal jugular vein patency (QCDR): Patent Access technique: Micropuncture set with 21 gauge needle Catheter placement An incision was made near the venous access site and the catheter was tunneled subcutaneously to the venous access site  The catheter was advanced via a peel-away sheath into the vein under fluoroscopic guidance  Catheter tip location was fluoroscopically verified and a permanent image was stored  Catheter placed: TasteSpacep Titan 15 5 Turkmen 28 cm Catheter cuff-to-tip length (cm): 23 Catheter flush: Normal saline Closure A sterile dressing was applied  Access site closure technique: Tissue adhesive Catheter securement technique: 0 Prolene suture Radiation Dose Fluoroscopy time (seconds): 5  Reference air kerma (mGy): 0 692 Kerma area product (Gy-cm2): 0 15 Additional Details Estimated blood loss (mL): 5 Complications: No immediate complications  Impression: Insertion of right-sided tunneled dialysis catheter, with tip in the expected location of the right atrium  Plan: The catheter may be used immediately   Workstation performed: [id number]       Incidental Findings:   · Minimally displaced, oblique fracture of the T10 vertebral body - patient to follow-up outpatient with neurosurgery   · Possible ankylosing spondylitis    Test Results Pending at Discharge (will require follow up):   · As per After Visit Summary     Outpatient Tests Requested:  · BMP, CBC    Complications:  Refer to hospital course and above listed diagnosis related plan, if any    Hospital Course:     Mary Castillo is a 57 y o  male patient who originally presented to the hospital on 1/21/2022 due to back pain x1 week after a mechanical fall and worsening leg swelling x1 week  He was found to have bilateral lower extremity edema extending up to the abdominal wall, and his admission creatinine was 5 79  He was admitted for suspected DEVIN related to recent NSAID use from his back pain vs cardiorenal syndrome vs progression of CKD  CT of the abdomen and pelvis was completed and revealed a minimally displaced oblique fracture of the T10 vertebral body  Neurosurgery was consulted and recommended use of TSLO, pain management, and eventual outpatient follow-up  Patient was placed on scheduled albumin, IV lasix, sodium bicarb tablets, and sevelamer, but due to continually rising creatinine (with a peak at 8 19) he was started on hemodialysis after tunneled dialysis catheter placement  A kidney biopsy was planned but eventually canceled after discussion between Nephrology and the patient suggesting that it would not change his treatment course at this time with dialysis already being initiated  He has received three inpatient dialysis sessions and his creatinine continues to improve  He has been cleared by nephrology to be discharged to rehab and continue outpatient dialysis  Discharge plan was discussed with patient  Offered to call family but patient declined    Please see above list of diagnoses and related plan for additional information  Condition at Discharge: stable     Discharge Day Visit / Exam:     Subjective:  Patient without complaints today, currently receiving bedside dialysis  He is nervous about his rehab transfer and placement       Vitals: Blood Pressure: 127/60 (02/02/22 1111)  Pulse: 59 (02/02/22 1111)  Temperature: 97 5 °F (36 4 °C) (02/02/22 1111)  Temp Source: Axillary (02/02/22 1111)  Respirations: 18 (02/02/22 1111)  Height: 6' 1" (185 4 cm) (01/21/22 1721)  Weight - Scale: 130 kg (286 lb 9 6 oz) (01/21/22 1721)  SpO2: 97 % (02/01/22 2333)  Exam:   Physical Exam  Constitutional:       General: He is not in acute distress  Appearance: He is obese  He is not ill-appearing  HENT:      Head: Normocephalic and atraumatic  Eyes:      Conjunctiva/sclera: Conjunctivae normal    Cardiovascular:      Rate and Rhythm: Normal rate and regular rhythm  Pulmonary:      Effort: Pulmonary effort is normal  No respiratory distress  Breath sounds: Normal breath sounds  No wheezing or rales  Chest:      Comments: + R-sided tunneled hemodialysis catheter covered with occlusive dressing; No surrounding erythema or discharge  Abdominal:      General: Bowel sounds are normal  There is no distension  Palpations: Abdomen is soft  Tenderness: There is no abdominal tenderness  There is no guarding or rebound  Musculoskeletal:      Right lower leg: Edema present  Left lower leg: Edema present  Comments: Lower extremity edema improving   Neurological:      Mental Status: He is alert and oriented to person, place, and time  Discharge instructions/Information to patient and family:(Discharge Medications and Follow up):   See after visit summary for information provided to patient and family  Provisions for Follow-Up Care:  See after visit summary for information related to follow-up care and any pertinent home health orders  Disposition: Short-term rehab at [facility] Readmission:  No     Discharge Statement:  I spent 40 minutes discharging the patient  This time was spent on the day of discharge  I had direct contact with the patient on the day of discharge   Greater than 50% of the total time was spent examining patient, answering all patient questions, arranging and discussing plan of care with patient as well as directly providing post-discharge instructions  Additional time then spent on discharge activities  Discharge Medications:  See after visit summary for reconciled discharge medications provided to patient and family  ** Please Note: "This note has been constructed using a voice recognition system  Therefore there may be syntax, spelling, and/or grammatical errors   Please call if you have any questions  "**

## 2022-02-02 NOTE — PROGRESS NOTES
NEPHROLOGY PROGRESS NOTE    Hieu Sawyer 57 y o  male MRN: [id number]  Unit/Bed#: 42375 Marilyn Kimball Encounter: [id number]  Reason for Consult:  End-stage renal disease    The patient is awake and alert was seen while on dialysis  Admits that he is anxious about going to rehab as is just due placing was very happy with the care he has received here  Still having some back pain  Dialysis going well without any issues  ASSESSMENT/PLAN:  1  Renal    The patient has end-stage renal disease due to diabetic nephropathy  The patient had heavy proteinuria and renal dysfunction for many years  Serologies were negative  What returned today was anti phospho lipase 2A receptor antibodies was negative as well  Regardless the patient has chronic kidney disease and end-stage renal disease and he has been initiated on hemodialysis  Details of today's treatment outline below  HEMODIALYSIS PROCEDURE NOTE  The patient was seen and examined on hemodialysis  Time: 3 hours  Sodium: 138 Blood flow: 350   Dialyzer: F160 Potassium: 3 Dialysate flow: 600   Access: catheter Bicarbonate: 35 Ultrafiltration goal: 3 L as tolerated        I was informed by case management patient is going to be discharged to rehab and will receive dialysis Tuesday Thursday Saturday and he is aware  Epogen has been held due to history of TIA but likely will be able to get this as an outpatient  2  T10 vertebral fracture after fall  Being discharged to rehab  SUBJECTIVE:  Review of Systems   Constitutional: Negative for chills, decreased appetite, fever and night sweats  HENT: Negative  Eyes: Negative  Cardiovascular: Positive for leg swelling  Negative for chest pain, orthopnea and palpitations  Respiratory: Negative  Negative for cough, shortness of breath, sputum production and wheezing  Musculoskeletal: Positive for back pain  Gastrointestinal: Negative for abdominal pain, diarrhea, nausea and vomiting     Genitourinary: Negative for dysuria, flank pain, hematuria and incomplete emptying  Neurological: Negative for dizziness, focal weakness and weakness  Psychiatric/Behavioral: Negative for altered mental status, depression, hallucinations and hypervigilance  The patient is nervous/anxious  OBJECTIVE:  Current Weight: Weight - Scale: 130 kg (286 lb 9 6 oz)  Vitals:Temp (24hrs), Av °F (36 7 °C), Min:97 3 °F (36 3 °C), Max:98 5 °F (36 9 °C)  Current: Temperature: (!) 97 3 °F (36 3 °C)   Blood pressure 128/67, pulse 57, temperature (!) 97 3 °F (36 3 °C), temperature source Axillary, resp  rate 18, height 6' 1" (1 854 m), weight 130 kg (286 lb 9 6 oz), SpO2 97 %  , Body mass index is 37 81 kg/m²  Intake/Output Summary (Last 24 hours) at 2022 0932  Last data filed at 2022 0811  Gross per 24 hour   Intake 690 ml   Output 700 ml   Net -10 ml       Physical Exam: /67   Pulse 57   Temp (!) 97 3 °F (36 3 °C) (Axillary)   Resp 18   Ht 6' 1" (1 854 m)   Wt 130 kg (286 lb 9 6 oz)   SpO2 97%   BMI 37 81 kg/m²   Physical Exam  Constitutional:       General: He is not in acute distress  Appearance: He is not toxic-appearing or diaphoretic  HENT:      Head: Normocephalic and atraumatic  Mouth/Throat:      Mouth: Mucous membranes are dry  Eyes:      General: No scleral icterus  Extraocular Movements: Extraocular movements intact  Cardiovascular:      Rate and Rhythm: Normal rate and regular rhythm  Heart sounds: No friction rub  No gallop  Comments: Positive edema  Pulmonary:      Effort: Pulmonary effort is normal  No respiratory distress  Breath sounds: Normal breath sounds  No wheezing, rhonchi or rales  Abdominal:      General: Bowel sounds are normal  There is no distension  Palpations: Abdomen is soft  Tenderness: There is no abdominal tenderness  There is no rebound  Musculoskeletal:      Cervical back: Normal range of motion and neck supple  Neurological:      General: No focal deficit present  Mental Status: He is alert and oriented to person, place, and time  Mental status is at baseline  Psychiatric:         Mood and Affect: Mood normal          Behavior: Behavior normal          Thought Content:  Thought content normal          Judgment: Judgment normal          Medications:    Current Facility-Administered Medications:     acetaminophen (TYLENOL) tablet 650 mg, 650 mg, Oral, Q6H KEMAL, Lore Revankar, DO, 650 mg at 02/02/22 0605    allopurinol (ZYLOPRIM) tablet 100 mg, 100 mg, Oral, Daily, Lexie Ny MD, 100 mg at 02/01/22 0807    atorvastatin (LIPITOR) tablet 80 mg, 80 mg, Oral, Daily, Lexie Ny MD, 80 mg at 02/01/22 0807    carvedilol (COREG) tablet 12 5 mg, 12 5 mg, Oral, BID, Lexie Ny MD, 12 5 mg at 02/01/22 1716    clopidogrel (PLAVIX) tablet 75 mg, 75 mg, Oral, Daily, Lorejennifer Amadorar, DO, 75 mg at 02/01/22 1104    heparin (porcine) subcutaneous injection 5,000 Units, 5,000 Units, Subcutaneous, Q8H [address], Lore Revankar, DO, 5,000 Units at 02/02/22 0606    HYDROmorphone (DILAUDID) injection 0 5 mg, 0 5 mg, Intravenous, Q4H PRN, Lore Revankar, DO, 0 5 mg at 02/02/22 0606    HYDROmorphone (DILAUDID) injection 1 mg, 1 mg, Intravenous, Once, Lexie Ny MD    insulin lispro (HumaLOG) 100 units/mL subcutaneous injection 1-5 Units, 1-5 Units, Subcutaneous, HS, Lexie Ny MD, 1 Units at 02/01/22 2133    insulin lispro (HumaLOG) 100 units/mL subcutaneous injection 2-12 Units, 2-12 Units, Subcutaneous, TID AC, 2 Units at 02/01/22 1630 **AND** Fingerstick Glucose (POCT), , , TID AC, Joel Dalton MD    lidocaine (LIDODERM) 5 % patch 1 patch, 1 patch, Topical, Daily, Lexie Ny MD, 1 patch at 01/29/22 0832    NIFEdipine (PROCARDIA XL) 24 hr tablet 30 mg, 30 mg, Oral, BID, Emilie Daley MD, 30 mg at 02/01/22 1716    ondansetron (ZOFRAN) injection 4 mg, 4 mg, Intravenous, Q6H PRN, Jennifer Berumen MD    oxyCODONE (ROXICODONE) IR tablet 2 5 mg, 2 5 mg, Oral, Q6H PRN, Jennifer Berumen MD    oxyCODONE (ROXICODONE) IR tablet 5 mg, 5 mg, Oral, Q6H PRN, Lore Silver DO, 5 mg at 02/02/22 0405    sevelamer (RENAGEL) tablet 800 mg, 800 mg, Oral, TID With Meals, Lino Corado MD, 800 mg at 02/01/22 1716    Laboratory Results:  Lab Results   Component Value Date    WBC 9 08 01/31/2022    HGB 7 9 (L) 02/01/2022    HCT 25 0 (L) 02/01/2022    MCV 94 01/31/2022     01/31/2022     Lab Results   Component Value Date    SODIUM 136 02/02/2022    K 4 2 02/02/2022     02/02/2022    CO2 25 02/02/2022    BUN 41 (H) 02/02/2022    CREATININE 5 13 (H) 02/02/2022    GLUC 110 02/02/2022    CALCIUM 8 1 (L) 02/02/2022     Lab Results   Component Value Date    CALCIUM 8 1 (L) 02/02/2022    PHOS 5 9 (H) 01/29/2022     No results found for: LABPROT

## 2022-02-02 NOTE — PLAN OF CARE
Problem: Prexisting or High Potential for Compromised Skin Integrity  Goal: Skin integrity is maintained or improved  Description: INTERVENTIONS:  - Identify patients at risk for skin breakdown  - Assess and monitor skin integrity  - Assess and monitor nutrition and hydration status  - Monitor labs   - Assess for incontinence   - Assist with mobility/ambulation  - Relieve pressure over bony prominences  - Avoid friction and shearing  - Provide appropriate hygiene as needed including keeping skin clean and dry  - Evaluate need for skin moisturizer/barrier cream  - Collaborate with interdisciplinary team   - Patient/family teaching  - Consider wound care consult   Outcome: Progressing     Problem: METABOLIC, FLUID AND ELECTROLYTES - ADULT  Goal: Electrolytes maintained within normal limits  Description: INTERVENTIONS:  - Monitor labs and assess patient for signs and symptoms of electrolyte imbalances  - Administer electrolyte replacement as ordered  - Monitor response to electrolyte replacements, including repeat lab results as appropriate  - Instruct patient on fluid and nutrition as appropriate  Outcome: Progressing  Goal: Fluid balance maintained  Description: INTERVENTIONS:  - Monitor labs   - Monitor I/O and WT  - Instruct patient on fluid and nutrition as appropriate  - Assess for signs & symptoms of volume excess or deficit  Outcome: Progressing

## 2022-02-02 NOTE — DISCHARGE INSTRUCTIONS
Perma-cath Placement   WHAT YOU NEED TO KNOW:   A perma-cath is a catheter placed through a vein into or near your right atrium  Your right atrium is the right upper chamber of your heart  A perma-cath is used for dialysis in an emergency or until a long-term device is ready to use  After your procedure, you will have some pain and swelling on your chest and neck  You may have some bruises on your chest and neck  You may also have 2 dressings, one on your chest and one on your neck  DISCHARGE INSTRUCTIONS:   Call 911 for any of the following:   · You feel lightheaded, short of breath, and have chest pain  · Your catheter comes out   Contact Interventional Radiology at [phone] Dada PATIENTS: Contact Interventional Radiology at [phone]) Martha Frazier PATIENTS: Contact Interventional Radiology at [phone]) if:  · Blood soaks through your bandage  · You have new swelling in your arm, neck, face, or chest on your right side  · Your catheter gets wet  · Your bruises or pain get worse  · You have a fever or chills  · Persistent nausea or vomiting  · Your incision is red, swollen, or draining pus  · You have questions or concerns about your condition or care  Self-care:       · Resume your normal diet  · Keep your dressings dry  Do not take a shower or swim  You may take a tub bath, but do not get your dressings wet  Water in your wound can cause bacteria to grow and cause an infection  If your dressing gets wet, dry it off and cover it with dry sterile gauze  Call your healthcare provider  Do not use soaps or ointments  · Do not change your dressings  Your healthcare provider or dialysis nurse will change your dressings  Your dressings should stay in place until your healthcare provider removes them  The dressing on your chest will stay as long as you have the catheter in place  The dressing prevents infection  · Do not remove the red and blue caps from the end of your catheter   The caps prevent air from getting into your catheter    Follow up with your healthcare provider as directed: Write down your questions so you remember to ask them during your visits     Maximinolie Seip needs to use TLSO brace with activity/when out of bed till seen by neurosurgery

## 2022-02-02 NOTE — INCIDENTAL FINDINGS
The following findings require follow up:  Radiographic finding  ·  Finding: Minimally displaced, oblique fracture of the T10 vertebral body   Possible ankylosing spondylitis   Follow up required: With neurosurgery

## 2022-02-02 NOTE — PLAN OF CARE
Problem: METABOLIC, FLUID AND ELECTROLYTES - ADULT  Goal: Electrolytes maintained within normal limits  Description: INTERVENTIONS:  - Monitor labs and assess patient for signs and symptoms of electrolyte imbalances  - Administer electrolyte replacement as ordered  - Monitor response to electrolyte replacements, including repeat lab results as appropriate  - Instruct patient on fluid and nutrition as appropriate  Outcome: Progressing  Goal: Fluid balance maintained  Description: INTERVENTIONS:  - Monitor labs   - Monitor I/O and WT  - Instruct patient on fluid and nutrition as appropriate  - Assess for signs & symptoms of volume excess or deficit  Outcome: Progressing   Post-Dialysis RN Treatment Note    Blood Pressure:  Pre 125/61 mm/Hg  Post 127/60 mmHg   EDW  tbd kg    Weight:  Pre 121 1 kg   Post 118 1 kg   Mode of weight measurement: Bed Scale   Volume Removed  3000 ml    Treatment duration 180 minutes    NS given  No    Treatment shortened?  No   Medications given during Rx None Reported   Estimated Kt/V  None Reported   Access type: Permacath/TDC   Access Issues: No    Report called to primary nurse   Yes         Sid Serrato RN

## 2022-02-02 NOTE — [place] UNIVERSAL TRANSFER FORM
[place] UN[place]ER FORM  (ALL ITEMS MUST BE COMPLETED)    1  TRANSFER FROM: [address] [facility]      TRANSFER TO: [facility]    2  DATE OF TRANSFER: 2/2/2022                        TIME OF TRANSFER: 1500    3  PATIENT NAME: TOPHER Angulo      YOB: 1960                             GENDER: male    4  LANGUAGE:   English    5  PHYSICIAN NAME:  Darcy Whitmore DO                   PHONE: [phone]    6  CODE STATUS: Level 1 - Full Code        Out of Hospital DNR Attached: N/a    7  :                                      :  Extended Emergency Contact Information  Primary Emergency Contact: Cordell Morataya  Mobile Phone: [phone]  Relation: Friend  Secondary Emergency Contact: Kylah Ortega  Mobile Phone: [phone]  Relation: Daughter           Health Care Representative/Proxy: No           Legal Guardian:  No             NAME OF:           HEALTH CARE REPRESENTATIVE/PROXY:                                         OR           LEGAL GUARDIAN, IF NOT :                                               PHONE:  (Day)           (Night)                        (Cell)    8  REASON FOR TRANSFER: S/p fall; new dialysis patient; strengthening & conditioning             V/S: /60 (BP Location: Left arm)   Pulse 59   Temp 97 5 °F (36 4 °C) (Axillary)   Resp 18   Ht 6' 1" (1 854 m)   Wt 130 kg (286 lb 9 6 oz)   SpO2 97%   BMI 37 81 kg/m²           PAIN: 0/10; oxycodone given 2/2/22 1153    9  PRIMARY DIAGNOSIS: ESRD (end stage renal disease) ([facility] [address] )      Secondary Diagnosis:         Pacemaker: No    Internal Defib: No          Mental Health Diagnosis (if Applicable):    10  RESTRAINTS: N/A    11  RESPIRATORY NEEDS: n/a    12  ISOLATION/PRECAUTION: N/A    13  ALLERGY: Patient has no known allergies  14  SENSORY: Intact    15   SKIN CONDITION: Redness, scaly, dryness b/l LE; blister to greater R toe    16  DIET: Diabetic Renal    17  IV ACCESS: Right IJ     18  PERSONAL ITEMS SENT WITH PATIENT: Back brace; Cellphone; ; Blue bag with 2 sweatshirts & sweatpants; black sweat jacket; green shirt; briefs     19  ATTACHED DOCUMENTS: MAR; Labs; AVS    20  AT RISK ALERTS: N/A        HARM TO: N/A    21  WEIGHT BEARING STATUS:         Left Leg: Full        Right Leg: Full    22  MENTAL STATUS: AOX4    23  FUNCTION:        Walk: With help        Transfer: Self        Toilet: Self        Feed: Self    24  IMMUNIZATIONS/SCREENING:     Immunization History   Administered Date(s) Administered    COVID-19 J&J ([place]) vaccine 0 5 mL 05/07/2021       25  BOWEL: BM 2/1/22-Continent    26  BLADDER: Continent; urinal  27   SENDING FACILITY CONTACT:                 Title: Elle [facility]unas        Unit: 2S        Phone: [phone]          [address] (if known):        Title: [address]        Unit: [id number]        Phone:

## 2022-02-02 NOTE — CASE MANAGEMENT
Case Management Discharge Planning Note    Patient name Clay Robertson  [facility] MRN [id number]  : 1960 Date 2022       Current Admission Date: 2022  Current Admission Diagnosis:Acute kidney injury superimposed on chronic kidney disease [facility])   Patient Active Problem List    Diagnosis Date Noted    Hemodialysis status ([facility] )     ESRD (end stage renal disease) ([facility] )     Hypervolemia     Anemia 2022    Acute kidney injury superimposed on chronic kidney disease ([facility] ) 2022    Diabetes mellitus ([place] [address] )     Hypertension     TIA (transient ischemic attack)     T10 vertebral fracture (HCC)       LOS (days): 12  Geometric Mean LOS (GMLOS) (days): 5 90  Days to GMLOS:-5 9     OBJECTIVE:  Risk of Unplanned Readmission Score: 20     Current admission status: Inpatient   Preferred Pharmacy:   [address] [place] [address] [facility]  Phone: [phone] Fax: [phone]    Primary Care Provider: Jia Borja MD    Primary Insurance: MEDICARE  Secondary Insurance:     DISCHARGE DETAILS:    Discharge planning discussed with[de-identified] Patient  Freedom of Choice: Yes  Comments - Freedom of Choice: Discharge planned for today  SW reviewed plan, STR at [facility] and dialysis at [address] [place] [address], with pt  Upon completion of rehab when pt returns to his sister's home Miryam Caballero can assist in transitioning dialysis to [facility] so it's closer to home  While at [facility] the facility will be covering pt's transportation to/from dialysis  Pt verbalized understanding of plan and is requesting transfer to [facility] upon discharge  Other Referral/Resources/Interventions Provided:  Interventions: Short Term Rehab,Dialysis  Referral Comments: STR planned at [facility]  Spoke with Gautam Chacko at facility to confirm admission today    Dialysis arrangements are set with [facility] on a TTS 11:15 am schedule  Treatment will start tomorrow, 2/3/22  Christy at [facility] aware of dialysis plan and will be arranging transport for tomorrow      Treatment Team Recommendation: Short Term Rehab  Discharge Destination Plan[de-identified] Short Term Rehab  Transport at Discharge : BLS Ambulance  Dispatcher Contacted: Yes  Number/Name of Dispatcher: [facility]  Transported by [employer] and Unit #):  (pending)  ETA of Transport (Date): 02/02/22  ETA of Transport (Time): 1400 (requested)      Accepting Facility Name, [facility] : [facility] at [place], [place]  Receiving Facility/Agency Phone Number: [phone] [place] [address]

## 2022-02-02 NOTE — NURSING NOTE
Pt discharged to [facility] with transport  AVS and medications explained to patient  Report given to Dianelys Luna  IV removed, HD cath dressing changed  Site clean dry intact and dated  Questions answered  Belongings bagged; clothes phone  wallet with $11 cash  No further questions at this time

## 2022-07-06 ENCOUNTER — HOSPITAL ENCOUNTER (INPATIENT)
Facility: HOSPITAL | Age: 62
LOS: 7 days | Discharge: HOME | DRG: 553 | End: 2022-07-14
Attending: EMERGENCY MEDICINE | Admitting: INTERNAL MEDICINE
Payer: MEDICARE

## 2022-07-06 ENCOUNTER — APPOINTMENT (EMERGENCY)
Dept: RADIOLOGY | Facility: HOSPITAL | Age: 62
DRG: 553 | End: 2022-07-06
Payer: MEDICARE

## 2022-07-06 DIAGNOSIS — R60.0 PEDAL EDEMA: ICD-10-CM

## 2022-07-06 DIAGNOSIS — M25.552 LEFT HIP PAIN: ICD-10-CM

## 2022-07-06 DIAGNOSIS — R10.32 LEFT INGUINAL PAIN: ICD-10-CM

## 2022-07-06 DIAGNOSIS — E11.9 TYPE 2 DIABETES MELLITUS, WITHOUT LONG-TERM CURRENT USE OF INSULIN (HCC): ICD-10-CM

## 2022-07-06 DIAGNOSIS — M79.605 LEFT LEG PAIN: Primary | ICD-10-CM

## 2022-07-06 DIAGNOSIS — Z99.2 HEMODIALYSIS STATUS (HCC): ICD-10-CM

## 2022-07-06 DIAGNOSIS — N30.90 CYSTITIS: ICD-10-CM

## 2022-07-06 DIAGNOSIS — S22.079A T10 VERTEBRAL FRACTURE (HCC): ICD-10-CM

## 2022-07-06 DIAGNOSIS — R59.0 LYMPHADENOPATHY, INGUINAL: ICD-10-CM

## 2022-07-06 DIAGNOSIS — N18.6 ESRD (END STAGE RENAL DISEASE) (HCC): ICD-10-CM

## 2022-07-06 PROBLEM — M79.89 LEFT LEG SWELLING: Status: ACTIVE | Noted: 2022-07-06

## 2022-07-06 LAB
ALBUMIN SERPL BCP-MCNC: 3.4 G/DL (ref 3.5–5)
ALP SERPL-CCNC: 121 U/L (ref 46–116)
ALT SERPL W P-5'-P-CCNC: 43 U/L (ref 12–78)
ANION GAP SERPL CALCULATED.3IONS-SCNC: 13 MMOL/L (ref 4–13)
AST SERPL W P-5'-P-CCNC: 24 U/L (ref 5–45)
BACTERIA UR QL AUTO: NORMAL /HPF
BASOPHILS # BLD AUTO: 0.03 THOUSANDS/ΜL (ref 0–0.1)
BASOPHILS NFR BLD AUTO: 0 % (ref 0–1)
BILIRUB SERPL-MCNC: 0.54 MG/DL (ref 0.2–1)
BILIRUB UR QL STRIP: NEGATIVE
BUN SERPL-MCNC: 40 MG/DL (ref 5–25)
CALCIUM ALBUM COR SERPL-MCNC: 9.3 MG/DL (ref 8.3–10.1)
CALCIUM SERPL-MCNC: 8.8 MG/DL (ref 8.3–10.1)
CHLORIDE SERPL-SCNC: 100 MMOL/L (ref 100–108)
CLARITY UR: CLEAR
CO2 SERPL-SCNC: 25 MMOL/L (ref 21–32)
COLOR UR: ABNORMAL
CREAT SERPL-MCNC: 6.87 MG/DL (ref 0.6–1.3)
EOSINOPHIL # BLD AUTO: 0.4 THOUSAND/ΜL (ref 0–0.61)
EOSINOPHIL NFR BLD AUTO: 3 % (ref 0–6)
ERYTHROCYTE [DISTWIDTH] IN BLOOD BY AUTOMATED COUNT: 16.7 % (ref 11.6–15.1)
GFR SERPL CREATININE-BSD FRML MDRD: 7 ML/MIN/1.73SQ M
GLUCOSE SERPL-MCNC: 124 MG/DL (ref 65–140)
GLUCOSE SERPL-MCNC: 154 MG/DL (ref 65–140)
GLUCOSE UR STRIP-MCNC: ABNORMAL MG/DL
HCT VFR BLD AUTO: 33.4 % (ref 36.5–49.3)
HGB BLD-MCNC: 10.8 G/DL (ref 12–17)
HGB UR QL STRIP.AUTO: ABNORMAL
IMM GRANULOCYTES # BLD AUTO: 0.08 THOUSAND/UL (ref 0–0.2)
IMM GRANULOCYTES NFR BLD AUTO: 1 % (ref 0–2)
KETONES UR STRIP-MCNC: NEGATIVE MG/DL
LEUKOCYTE ESTERASE UR QL STRIP: NEGATIVE
LYMPHOCYTES # BLD AUTO: 1.01 THOUSANDS/ΜL (ref 0.6–4.47)
LYMPHOCYTES NFR BLD AUTO: 8 % (ref 14–44)
MCH RBC QN AUTO: 28.7 PG (ref 26.8–34.3)
MCHC RBC AUTO-ENTMCNC: 32.3 G/DL (ref 31.4–37.4)
MCV RBC AUTO: 89 FL (ref 82–98)
MONOCYTES # BLD AUTO: 1.21 THOUSAND/ΜL (ref 0.17–1.22)
MONOCYTES NFR BLD AUTO: 10 % (ref 4–12)
NEUTROPHILS # BLD AUTO: 9.69 THOUSANDS/ΜL (ref 1.85–7.62)
NEUTS SEG NFR BLD AUTO: 78 % (ref 43–75)
NITRITE UR QL STRIP: NEGATIVE
NON-SQ EPI CELLS URNS QL MICRO: NORMAL /HPF
NRBC BLD AUTO-RTO: 0 /100 WBCS
PH UR STRIP.AUTO: 8 [PH]
PLATELET # BLD AUTO: 248 THOUSANDS/UL (ref 149–390)
PMV BLD AUTO: 9.8 FL (ref 8.9–12.7)
POTASSIUM SERPL-SCNC: 4.3 MMOL/L (ref 3.5–5.3)
PROT SERPL-MCNC: 7.5 G/DL (ref 6.4–8.2)
PROT UR STRIP-MCNC: >=300 MG/DL
RBC # BLD AUTO: 3.76 MILLION/UL (ref 3.88–5.62)
RBC #/AREA URNS AUTO: NORMAL /HPF
SODIUM SERPL-SCNC: 138 MMOL/L (ref 136–145)
SP GR UR STRIP.AUTO: 1.02 (ref 1–1.03)
UROBILINOGEN UR QL STRIP.AUTO: 0.2 E.U./DL
WBC # BLD AUTO: 12.42 THOUSAND/UL (ref 4.31–10.16)
WBC #/AREA URNS AUTO: NORMAL /HPF

## 2022-07-06 PROCEDURE — 96375 TX/PRO/DX INJ NEW DRUG ADDON: CPT

## 2022-07-06 PROCEDURE — 96374 THER/PROPH/DIAG INJ IV PUSH: CPT

## 2022-07-06 PROCEDURE — 81001 URINALYSIS AUTO W/SCOPE: CPT | Performed by: EMERGENCY MEDICINE

## 2022-07-06 PROCEDURE — 36415 COLL VENOUS BLD VENIPUNCTURE: CPT | Performed by: EMERGENCY MEDICINE

## 2022-07-06 PROCEDURE — 82948 REAGENT STRIP/BLOOD GLUCOSE: CPT

## 2022-07-06 PROCEDURE — G1004 CDSM NDSC: HCPCS

## 2022-07-06 PROCEDURE — 80053 COMPREHEN METABOLIC PANEL: CPT | Performed by: EMERGENCY MEDICINE

## 2022-07-06 PROCEDURE — 99220 PR INITIAL OBSERVATION CARE/DAY 70 MINUTES: CPT

## 2022-07-06 PROCEDURE — 74176 CT ABD & PELVIS W/O CONTRAST: CPT

## 2022-07-06 PROCEDURE — 99285 EMERGENCY DEPT VISIT HI MDM: CPT | Performed by: EMERGENCY MEDICINE

## 2022-07-06 PROCEDURE — 96376 TX/PRO/DX INJ SAME DRUG ADON: CPT

## 2022-07-06 PROCEDURE — 99285 EMERGENCY DEPT VISIT HI MDM: CPT

## 2022-07-06 PROCEDURE — 85025 COMPLETE CBC W/AUTO DIFF WBC: CPT | Performed by: EMERGENCY MEDICINE

## 2022-07-06 RX ORDER — ONDANSETRON 2 MG/ML
4 INJECTION INTRAMUSCULAR; INTRAVENOUS ONCE
Status: COMPLETED | OUTPATIENT
Start: 2022-07-06 | End: 2022-07-06

## 2022-07-06 RX ORDER — CARVEDILOL 12.5 MG/1
12.5 TABLET ORAL 2 TIMES DAILY
Status: DISCONTINUED | OUTPATIENT
Start: 2022-07-06 | End: 2022-07-14 | Stop reason: HOSPADM

## 2022-07-06 RX ORDER — HEPARIN SODIUM 5000 [USP'U]/ML
5000 INJECTION, SOLUTION INTRAVENOUS; SUBCUTANEOUS EVERY 8 HOURS SCHEDULED
Status: DISCONTINUED | OUTPATIENT
Start: 2022-07-06 | End: 2022-07-14 | Stop reason: HOSPADM

## 2022-07-06 RX ORDER — CEFTRIAXONE 1 G/50ML
1000 INJECTION, SOLUTION INTRAVENOUS EVERY 24 HOURS
Status: DISCONTINUED | OUTPATIENT
Start: 2022-07-07 | End: 2022-07-06

## 2022-07-06 RX ORDER — INSULIN LISPRO 100 [IU]/ML
1-6 INJECTION, SOLUTION INTRAVENOUS; SUBCUTANEOUS
Status: DISCONTINUED | OUTPATIENT
Start: 2022-07-06 | End: 2022-07-14 | Stop reason: HOSPADM

## 2022-07-06 RX ORDER — ALLOPURINOL 100 MG/1
100 TABLET ORAL DAILY
Status: DISCONTINUED | OUTPATIENT
Start: 2022-07-07 | End: 2022-07-14 | Stop reason: HOSPADM

## 2022-07-06 RX ORDER — CLOPIDOGREL BISULFATE 75 MG/1
75 TABLET ORAL DAILY
Status: DISCONTINUED | OUTPATIENT
Start: 2022-07-07 | End: 2022-07-14 | Stop reason: HOSPADM

## 2022-07-06 RX ORDER — ATORVASTATIN CALCIUM 80 MG/1
80 TABLET, FILM COATED ORAL
Status: DISCONTINUED | OUTPATIENT
Start: 2022-07-06 | End: 2022-07-14 | Stop reason: HOSPADM

## 2022-07-06 RX ORDER — ONDANSETRON 2 MG/ML
4 INJECTION INTRAMUSCULAR; INTRAVENOUS EVERY 6 HOURS PRN
Status: DISCONTINUED | OUTPATIENT
Start: 2022-07-06 | End: 2022-07-14 | Stop reason: HOSPADM

## 2022-07-06 RX ORDER — MORPHINE SULFATE 4 MG/ML
4 INJECTION, SOLUTION INTRAMUSCULAR; INTRAVENOUS ONCE
Status: COMPLETED | OUTPATIENT
Start: 2022-07-06 | End: 2022-07-06

## 2022-07-06 RX ORDER — INSULIN LISPRO 100 [IU]/ML
1-6 INJECTION, SOLUTION INTRAVENOUS; SUBCUTANEOUS
Status: DISCONTINUED | OUTPATIENT
Start: 2022-07-07 | End: 2022-07-14 | Stop reason: HOSPADM

## 2022-07-06 RX ORDER — CEFTRIAXONE 1 G/50ML
1000 INJECTION, SOLUTION INTRAVENOUS ONCE
Status: COMPLETED | OUTPATIENT
Start: 2022-07-06 | End: 2022-07-06

## 2022-07-06 RX ORDER — ACETAMINOPHEN 325 MG/1
650 TABLET ORAL EVERY 6 HOURS PRN
Status: DISCONTINUED | OUTPATIENT
Start: 2022-07-06 | End: 2022-07-12

## 2022-07-06 RX ORDER — NIFEDIPINE 30 MG/1
30 TABLET, EXTENDED RELEASE ORAL 2 TIMES DAILY
Status: DISCONTINUED | OUTPATIENT
Start: 2022-07-06 | End: 2022-07-14 | Stop reason: HOSPADM

## 2022-07-06 RX ORDER — OXYCODONE HYDROCHLORIDE 5 MG/1
5 TABLET ORAL EVERY 4 HOURS PRN
Status: DISCONTINUED | OUTPATIENT
Start: 2022-07-06 | End: 2022-07-07

## 2022-07-06 RX ORDER — SEVELAMER HYDROCHLORIDE 800 MG/1
800 TABLET, FILM COATED ORAL
Status: DISCONTINUED | OUTPATIENT
Start: 2022-07-07 | End: 2022-07-14 | Stop reason: HOSPADM

## 2022-07-06 RX ADMIN — ONDANSETRON 4 MG: 2 INJECTION INTRAMUSCULAR; INTRAVENOUS at 19:24

## 2022-07-06 RX ADMIN — CARVEDILOL 12.5 MG: 12.5 TABLET, FILM COATED ORAL at 22:07

## 2022-07-06 RX ADMIN — MORPHINE SULFATE 2 MG: 2 INJECTION, SOLUTION INTRAMUSCULAR; INTRAVENOUS at 18:58

## 2022-07-06 RX ADMIN — OXYCODONE HYDROCHLORIDE 5 MG: 5 TABLET ORAL at 23:06

## 2022-07-06 RX ADMIN — NIFEDIPINE 30 MG: 30 TABLET, EXTENDED RELEASE ORAL at 22:57

## 2022-07-06 RX ADMIN — MORPHINE SULFATE 4 MG: 4 INJECTION INTRAVENOUS at 20:23

## 2022-07-06 RX ADMIN — ATORVASTATIN CALCIUM 80 MG: 80 TABLET, FILM COATED ORAL at 22:57

## 2022-07-06 RX ADMIN — HEPARIN SODIUM 5000 UNITS: 5000 INJECTION INTRAVENOUS; SUBCUTANEOUS at 22:57

## 2022-07-06 RX ADMIN — CEFTRIAXONE 1000 MG: 1 INJECTION, SOLUTION INTRAVENOUS at 21:32

## 2022-07-07 ENCOUNTER — APPOINTMENT (OUTPATIENT)
Dept: DIALYSIS | Facility: HOSPITAL | Age: 62
DRG: 553 | End: 2022-07-07
Payer: MEDICARE

## 2022-07-07 ENCOUNTER — APPOINTMENT (OUTPATIENT)
Dept: RADIOLOGY | Facility: HOSPITAL | Age: 62
DRG: 553 | End: 2022-07-07
Payer: MEDICARE

## 2022-07-07 LAB
ANION GAP SERPL CALCULATED.3IONS-SCNC: 12 MMOL/L (ref 4–13)
BASOPHILS # BLD AUTO: 0.03 THOUSANDS/ΜL (ref 0–0.1)
BASOPHILS NFR BLD AUTO: 0 % (ref 0–1)
BUN SERPL-MCNC: 41 MG/DL (ref 5–25)
CALCIUM SERPL-MCNC: 8.5 MG/DL (ref 8.3–10.1)
CHLORIDE SERPL-SCNC: 98 MMOL/L (ref 100–108)
CO2 SERPL-SCNC: 25 MMOL/L (ref 21–32)
CREAT SERPL-MCNC: 6.92 MG/DL (ref 0.6–1.3)
EOSINOPHIL # BLD AUTO: 0.11 THOUSAND/ΜL (ref 0–0.61)
EOSINOPHIL NFR BLD AUTO: 1 % (ref 0–6)
ERYTHROCYTE [DISTWIDTH] IN BLOOD BY AUTOMATED COUNT: 17 % (ref 11.6–15.1)
EST. AVERAGE GLUCOSE BLD GHB EST-MCNC: 143 MG/DL
GFR SERPL CREATININE-BSD FRML MDRD: 7 ML/MIN/1.73SQ M
GLUCOSE SERPL-MCNC: 108 MG/DL (ref 65–140)
GLUCOSE SERPL-MCNC: 113 MG/DL (ref 65–140)
GLUCOSE SERPL-MCNC: 114 MG/DL (ref 65–140)
GLUCOSE SERPL-MCNC: 117 MG/DL (ref 65–140)
GLUCOSE SERPL-MCNC: 130 MG/DL (ref 65–140)
HBA1C MFR BLD: 6.6 %
HCT VFR BLD AUTO: 30.7 % (ref 36.5–49.3)
HGB BLD-MCNC: 9.8 G/DL (ref 12–17)
IMM GRANULOCYTES # BLD AUTO: 0.06 THOUSAND/UL (ref 0–0.2)
IMM GRANULOCYTES NFR BLD AUTO: 1 % (ref 0–2)
LYMPHOCYTES # BLD AUTO: 1.5 THOUSANDS/ΜL (ref 0.6–4.47)
LYMPHOCYTES NFR BLD AUTO: 16 % (ref 14–44)
MCH RBC QN AUTO: 28.7 PG (ref 26.8–34.3)
MCHC RBC AUTO-ENTMCNC: 31.9 G/DL (ref 31.4–37.4)
MCV RBC AUTO: 90 FL (ref 82–98)
MONOCYTES # BLD AUTO: 1.35 THOUSAND/ΜL (ref 0.17–1.22)
MONOCYTES NFR BLD AUTO: 14 % (ref 4–12)
NEUTROPHILS # BLD AUTO: 6.51 THOUSANDS/ΜL (ref 1.85–7.62)
NEUTS SEG NFR BLD AUTO: 68 % (ref 43–75)
NRBC BLD AUTO-RTO: 0 /100 WBCS
PLATELET # BLD AUTO: 236 THOUSANDS/UL (ref 149–390)
PMV BLD AUTO: 10 FL (ref 8.9–12.7)
POTASSIUM SERPL-SCNC: 4.3 MMOL/L (ref 3.5–5.3)
RBC # BLD AUTO: 3.42 MILLION/UL (ref 3.88–5.62)
SODIUM SERPL-SCNC: 135 MMOL/L (ref 136–145)
WBC # BLD AUTO: 9.56 THOUSAND/UL (ref 4.31–10.16)

## 2022-07-07 PROCEDURE — 87081 CULTURE SCREEN ONLY: CPT | Performed by: INTERNAL MEDICINE

## 2022-07-07 PROCEDURE — 82948 REAGENT STRIP/BLOOD GLUCOSE: CPT

## 2022-07-07 PROCEDURE — 85025 COMPLETE CBC W/AUTO DIFF WBC: CPT

## 2022-07-07 PROCEDURE — 99232 SBSQ HOSP IP/OBS MODERATE 35: CPT | Performed by: INTERNAL MEDICINE

## 2022-07-07 PROCEDURE — 5A1D70Z PERFORMANCE OF URINARY FILTRATION, INTERMITTENT, LESS THAN 6 HOURS PER DAY: ICD-10-PCS | Performed by: FAMILY MEDICINE

## 2022-07-07 PROCEDURE — 99222 1ST HOSP IP/OBS MODERATE 55: CPT | Performed by: ORTHOPAEDIC SURGERY

## 2022-07-07 PROCEDURE — 93970 EXTREMITY STUDY: CPT

## 2022-07-07 PROCEDURE — 83036 HEMOGLOBIN GLYCOSYLATED A1C: CPT

## 2022-07-07 PROCEDURE — 80048 BASIC METABOLIC PNL TOTAL CA: CPT

## 2022-07-07 PROCEDURE — 99222 1ST HOSP IP/OBS MODERATE 55: CPT | Performed by: INTERNAL MEDICINE

## 2022-07-07 RX ORDER — TIZANIDINE 2 MG/1
4 TABLET ORAL 3 TIMES DAILY
Status: DISCONTINUED | OUTPATIENT
Start: 2022-07-07 | End: 2022-07-09

## 2022-07-07 RX ORDER — HYDROMORPHONE HCL/PF 1 MG/ML
0.5 SYRINGE (ML) INJECTION ONCE
Status: COMPLETED | OUTPATIENT
Start: 2022-07-07 | End: 2022-07-07

## 2022-07-07 RX ORDER — HYDROMORPHONE HCL IN WATER/PF 6 MG/30 ML
0.2 PATIENT CONTROLLED ANALGESIA SYRINGE INTRAVENOUS EVERY 4 HOURS PRN
Status: DISCONTINUED | OUTPATIENT
Start: 2022-07-07 | End: 2022-07-13

## 2022-07-07 RX ORDER — OXYCODONE HYDROCHLORIDE 10 MG/1
10 TABLET ORAL ONCE
Status: COMPLETED | OUTPATIENT
Start: 2022-07-07 | End: 2022-07-07

## 2022-07-07 RX ORDER — OXYCODONE HYDROCHLORIDE 10 MG/1
10 TABLET ORAL EVERY 4 HOURS PRN
Status: DISCONTINUED | OUTPATIENT
Start: 2022-07-07 | End: 2022-07-07

## 2022-07-07 RX ORDER — OXYCODONE HYDROCHLORIDE 10 MG/1
10 TABLET ORAL EVERY 4 HOURS PRN
Status: DISCONTINUED | OUTPATIENT
Start: 2022-07-07 | End: 2022-07-14 | Stop reason: HOSPADM

## 2022-07-07 RX ORDER — OXYCODONE HYDROCHLORIDE 5 MG/1
5 TABLET ORAL EVERY 4 HOURS PRN
Status: DISCONTINUED | OUTPATIENT
Start: 2022-07-07 | End: 2022-07-14 | Stop reason: HOSPADM

## 2022-07-07 RX ADMIN — CLOPIDOGREL BISULFATE 75 MG: 75 TABLET ORAL at 08:00

## 2022-07-07 RX ADMIN — SEVELAMER HYDROCHLORIDE 800 MG: 800 TABLET, FILM COATED PARENTERAL at 07:53

## 2022-07-07 RX ADMIN — TIZANIDINE 4 MG: 2 TABLET ORAL at 16:48

## 2022-07-07 RX ADMIN — TIZANIDINE 4 MG: 2 TABLET ORAL at 11:52

## 2022-07-07 RX ADMIN — NIFEDIPINE 30 MG: 30 TABLET, EXTENDED RELEASE ORAL at 17:00

## 2022-07-07 RX ADMIN — SEVELAMER HYDROCHLORIDE 800 MG: 800 TABLET, FILM COATED PARENTERAL at 16:48

## 2022-07-07 RX ADMIN — TIZANIDINE 4 MG: 2 TABLET ORAL at 22:48

## 2022-07-07 RX ADMIN — CARVEDILOL 12.5 MG: 12.5 TABLET, FILM COATED ORAL at 08:00

## 2022-07-07 RX ADMIN — NIFEDIPINE 30 MG: 30 TABLET, EXTENDED RELEASE ORAL at 08:00

## 2022-07-07 RX ADMIN — OXYCODONE HYDROCHLORIDE 5 MG: 5 TABLET ORAL at 07:50

## 2022-07-07 RX ADMIN — OXYCODONE HYDROCHLORIDE 5 MG: 5 TABLET ORAL at 13:10

## 2022-07-07 RX ADMIN — CARVEDILOL 12.5 MG: 12.5 TABLET, FILM COATED ORAL at 17:00

## 2022-07-07 RX ADMIN — HYDROMORPHONE HYDROCHLORIDE 0.2 MG: 0.2 INJECTION, SOLUTION INTRAMUSCULAR; INTRAVENOUS; SUBCUTANEOUS at 20:10

## 2022-07-07 RX ADMIN — ATORVASTATIN CALCIUM 80 MG: 80 TABLET, FILM COATED ORAL at 22:48

## 2022-07-07 RX ADMIN — HEPARIN SODIUM 5000 UNITS: 5000 INJECTION INTRAVENOUS; SUBCUTANEOUS at 22:48

## 2022-07-07 RX ADMIN — HYDROMORPHONE HYDROCHLORIDE 0.2 MG: 0.2 INJECTION, SOLUTION INTRAMUSCULAR; INTRAVENOUS; SUBCUTANEOUS at 14:14

## 2022-07-07 RX ADMIN — OXYCODONE HYDROCHLORIDE 5 MG: 5 TABLET ORAL at 03:34

## 2022-07-07 RX ADMIN — HEPARIN SODIUM 5000 UNITS: 5000 INJECTION INTRAVENOUS; SUBCUTANEOUS at 05:11

## 2022-07-07 RX ADMIN — OXYCODONE HYDROCHLORIDE 10 MG: 10 TABLET ORAL at 10:40

## 2022-07-07 RX ADMIN — HYDROMORPHONE HYDROCHLORIDE 0.5 MG: 1 INJECTION, SOLUTION INTRAMUSCULAR; INTRAVENOUS; SUBCUTANEOUS at 04:41

## 2022-07-07 RX ADMIN — OXYCODONE HYDROCHLORIDE 10 MG: 10 TABLET ORAL at 18:28

## 2022-07-07 RX ADMIN — ALLOPURINOL 100 MG: 100 TABLET ORAL at 08:00

## 2022-07-07 RX ADMIN — HEPARIN SODIUM 5000 UNITS: 5000 INJECTION INTRAVENOUS; SUBCUTANEOUS at 13:10

## 2022-07-07 NOTE — PLAN OF CARE
Problem: Potential for Falls  Goal: Patient will remain free of falls  Description: INTERVENTIONS:  - Educate patient/family on patient safety including physical limitations  - Instruct patient to call for assistance with activity   - Consult OT/PT to assist with strengthening/mobility   - Keep Call bell within reach  - Keep bed low and locked with side rails adjusted as appropriate  - Keep care items and personal belongings within reach  - Initiate and maintain comfort rounds  - Make Fall Risk Sign visible to staff  - Offer Toileting every 2 Hours, in advance of need  - Initiate/Maintain bed  alarm  - Obtain necessary fall risk management equipment: bed alarm, yellow bracelet, yellow socks  - Apply yellow socks and bracelet for high fall risk patients  - Consider moving patient to room near nurses station  Outcome: Progressing     Problem: MOBILITY - ADULT  Goal: Maintain or return to baseline ADL function  Description: INTERVENTIONS:  -  Assess patient's ability to carry out ADLs; assess patient's baseline for ADL function and identify physical deficits which impact ability to perform ADLs (bathing, care of mouth/teeth, toileting, grooming, dressing, etc )  - Assess/evaluate cause of self-care deficits   - Assess range of motion  - Assess patient's mobility; develop plan if impaired  - Assess patient's need for assistive devices and provide as appropriate  - Encourage maximum independence but intervene and supervise when necessary  - Involve family in performance of ADLs  - Assess for home care needs following discharge   - Consider OT consult to assist with ADL evaluation and planning for discharge  - Provide patient education as appropriate  Outcome: Progressing  Goal: Maintains/Returns to pre admission functional level  Description: INTERVENTIONS:  - Perform BMAT or MOVE assessment daily    - Set and communicate daily mobility goal to care team and patient/family/caregiver     - Collaborate with rehabilitation services on mobility goals if consulted  - Perform Range of Motion 3 times a day  - Reposition patient every 2 hours    - Dangle patient 3 times a day  - Stand patient 3 times a day  - Ambulate patient 3 times a day  - Out of bed to chair 3 times a day   - Out of bed for meals 3 times a day  - Out of bed for toileting  - Record patient progress and toleration of activity level   Outcome: Progressing     Problem: Prexisting or High Potential for Compromised Skin Integrity  Goal: Skin integrity is maintained or improved  Description: INTERVENTIONS:  - Identify patients at risk for skin breakdown  - Assess and monitor skin integrity  - Assess and monitor nutrition and hydration status  - Monitor labs   - Assess for incontinence   - Turn and reposition patient  - Assist with mobility/ambulation  - Relieve pressure over bony prominences  - Avoid friction and shearing  - Provide appropriate hygiene as needed including keeping skin clean and dry  - Evaluate need for skin moisturizer/barrier cream  - Collaborate with interdisciplinary team   - Patient/family teaching  - Consider wound care consult   Outcome: Progressing

## 2022-07-07 NOTE — ASSESSMENT & PLAN NOTE
Lab Results   Component Value Date    HGBA1C 6 6 (H) 07/07/2022     Recent Labs     07/06/22  2242 07/07/22  0716 07/07/22  1137 07/07/22  1523   POCGLU 124 108 114 130     · Prior to admission on linagliptin    Continue sliding scale during hospitalization

## 2022-07-07 NOTE — PLAN OF CARE
Post-Dialysis RN Treatment Note    Blood Pressure:  Pre 143/66 mm/Hg  Post 128/57 mmHg   EDW  TBD kg    Weight:  Pre 106 8 kg   Post 105 8kg   Mode of weight measurement: Bed Scale   Volume Removed  1000 ml    Treatment duration 180 minutes    NS given  Yes, low BP,100 ml NSS    Treatment shortened?  No   Medications given during Rx Not Applicable   Estimated Kt/V  Not Applicable   Access type: Permacath/TDC   Access Issues: No    Report called to primary nurse   Yes / Rosa Garrido, RN      2000 ml as tolerated, 3K bath, 3 hrs  Problem: METABOLIC, FLUID AND ELECTROLYTES - ADULT  Goal: Electrolytes maintained within normal limits  Description: INTERVENTIONS:  - Monitor labs and assess patient for signs and symptoms of electrolyte imbalances  - Administer electrolyte replacement as ordered  - Monitor response to electrolyte replacements, including repeat lab results as appropriate  - Instruct patient on fluid and nutrition as appropriate  Outcome: Progressing  Goal: Fluid balance maintained  Description: INTERVENTIONS:  - Monitor labs   - Monitor I/O and WT  - Instruct patient on fluid and nutrition as appropriate  - Assess for signs & symptoms of volume excess or deficit  Outcome: Progressing

## 2022-07-07 NOTE — ASSESSMENT & PLAN NOTE
Lab Results   Component Value Date    HGBA1C 6 6 (H) 01/24/2022       Recent Labs     07/06/22  2242   POCGLU 124       Blood Sugar Average: Last 72 hrs:  (P) 124    Check HgA1c   Diabetic diet   Start SSI and accuchecks ac, hs

## 2022-07-07 NOTE — ED PROVIDER NOTES
History  Chief Complaint   Patient presents with    Pain     In groin, increasing since this am - no fall     54-year-old male presents to the ED by way of ambulance with pain in the left side of his groin increasing since this morning  Patient denies any fall injury to the area  Patient is awake alert he states he cannot walk or move on his leg  States he cannot take care of himself at home  Pain is minimal when he is lying still was oozing moves his legs he states he has severe pain  Does have some swelling noted left foot and ankle which she states has been that way for about 1-2 weeks now  Patient is currently on dialysis and only did have his dialysis yesterday due to not feeling well  History provided by:  Patient   used: No        Prior to Admission Medications   Prescriptions Last Dose Informant Patient Reported? Taking?    NIFEdipine ER (ADALAT CC) 30 MG 24 hr tablet 7/5/2022 at Unknown time  Yes Yes   Sig: Take 30 mg by mouth 2 (two) times a day   acetaminophen (TYLENOL) 325 mg tablet Unknown at Unknown time  No No   Sig: Take 2 tablets (650 mg total) by mouth every 6 (six) hours as needed for mild pain, headaches or fever   allopurinol (ZYLOPRIM) 100 mg tablet 7/5/2022 at Unknown time  Yes Yes   Sig: Take 100 mg by mouth daily   atorvastatin (LIPITOR) 80 mg tablet Unknown at Unknown time  Yes No   Sig: Take 80 mg by mouth daily   carvedilol (COREG) 6 25 mg tablet 7/5/2022 at Unknown time  Yes Yes   Sig: Take 12 5 mg by mouth 2 (two) times a day   clopidogrel (PLAVIX) 75 mg tablet Unknown at Unknown time  Yes No   Sig: Take 75 mg by mouth daily   lidocaine (LIDODERM) 5 %   No No   Sig: Apply 1 patch topically daily Lower back - Remove & Discard patch within 12 hours or as directed by MD   linaGLIPtin 5 MG TABS Unknown at Unknown time  No No   Sig: Take 5 mg by mouth in the morning   oxyCODONE (ROXICODONE) 5 immediate release tablet Unknown at Unknown time  No No   Sig: Take 0 5 tablets (2 5 mg total) by mouth every 6 (six) hours as needed for moderate pain Max Daily Amount: 10 mg   oxyCODONE (ROXICODONE) 5 immediate release tablet Unknown at Unknown time  No No   Sig: Take 1 tablet (5 mg total) by mouth every 6 (six) hours as needed for severe pain Max Daily Amount: 20 mg   sevelamer (RENAGEL) 800 mg tablet 7/5/2022 at Unknown time  No Yes   Sig: Take 1 tablet (800 mg total) by mouth 3 (three) times a day with meals      Facility-Administered Medications: None       Past Medical History:   Diagnosis Date    CKD     Diabetes mellitus ([facility] [address] )     Hypertension     [facility]     TIA (transient ischemic attack)        Past Surgical History:   Procedure Laterality Date    IR TUNNELED DIALYSIS CATHETER PLACEMENT  1/27/2022       History reviewed  No pertinent family history  I have reviewed and agree with the history as documented  E-Cigarette/Vaping    E-Cigarette Use Never User      E-Cigarette/Vaping Substances     Social History     Tobacco Use    Smoking status: Never Smoker    Smokeless tobacco: Never Used   Vaping Use    Vaping Use: Never used   Substance Use Topics    Alcohol use: Not Currently     Alcohol/week: 0 0 standard drinks     Comment: 0    Drug use: Never       Review of Systems   Constitutional: Negative for activity change, chills, diaphoresis and fever  HENT: Negative for congestion, ear pain, nosebleeds, sore throat, trouble swallowing and voice change  Eyes: Negative for pain, discharge and redness  Respiratory: Negative for apnea, cough, choking, shortness of breath, wheezing and stridor  Cardiovascular: Negative for chest pain and palpitations  Gastrointestinal: Negative for abdominal distention, abdominal pain, constipation, diarrhea, nausea and vomiting  Endocrine: Negative for polydipsia  Genitourinary: Negative for difficulty urinating, dysuria, flank pain, frequency, hematuria and urgency     Musculoskeletal: Positive for arthralgias, gait problem and joint swelling  Negative for back pain, myalgias, neck pain and neck stiffness  Skin: Negative for pallor and rash  Neurological: Negative for dizziness, tremors, syncope, speech difficulty, weakness, numbness and headaches  Hematological: Negative for adenopathy  Psychiatric/Behavioral: Negative for confusion, hallucinations, self-injury and suicidal ideas  The patient is not nervous/anxious  Physical Exam  Physical Exam  Vitals and nursing note reviewed  Constitutional:       General: He is not in acute distress  Appearance: He is well-developed  He is not diaphoretic  HENT:      Head: Normocephalic and atraumatic  Right Ear: External ear normal       Left Ear: External ear normal       Nose: Nose normal    Eyes:      Conjunctiva/sclera: Conjunctivae normal       Pupils: Pupils are equal, round, and reactive to light  Cardiovascular:      Rate and Rhythm: Normal rate and regular rhythm  Heart sounds: Normal heart sounds  Pulmonary:      Effort: Pulmonary effort is normal       Breath sounds: Normal breath sounds  Abdominal:      General: Bowel sounds are normal       Palpations: Abdomen is soft  Musculoskeletal:         General: Normal range of motion  Cervical back: Normal range of motion and neck supple  Skin:     General: Skin is warm and dry  Neurological:      Mental Status: He is alert and oriented to person, place, and time  Deep Tendon Reflexes: Reflexes are normal and symmetric           Vital Signs  ED Triage Vitals [07/06/22 1822]   Temperature Pulse Respirations Blood Pressure SpO2   97 8 °F (36 6 °C) 79 18 161/74 99 %      Temp Source Heart Rate Source Patient Position - Orthostatic VS BP Location FiO2 (%)   Oral Monitor Sitting Right arm --      Pain Score       10 - Worst Possible Pain           Vitals:    07/06/22 2030 07/06/22 2045 07/06/22 2100 07/06/22 2115   BP: (!) 206/81      Pulse: 82 82 90 92   Patient Position - Orthostatic VS:             Visual Acuity      ED Medications  Medications   cefTRIAXone (ROCEPHIN) IVPB (premix in dextrose) 1,000 mg 50 mL (1,000 mg Intravenous New Bag 7/6/22 2132)   morphine injection 2 mg (2 mg Intravenous Given 7/6/22 1858)   ondansetron (ZOFRAN) injection 4 mg (4 mg Intravenous Given 7/6/22 1924)   morphine injection 4 mg (4 mg Intravenous Given 7/6/22 2023)       Diagnostic Studies  Results Reviewed     Procedure Component Value Units Date/Time    UA (URINE) with reflex to Scope [[id number]]     Lab Status: No result Specimen: Urine     Comprehensive metabolic panel [[id number]]  (Abnormal) Collected: 07/06/22 1856    Lab Status: Final result Specimen: Blood from Arm, Left Updated: 07/06/22 1920     Sodium 138 mmol/L      Potassium 4 3 mmol/L      Chloride 100 mmol/L      CO2 25 mmol/L      ANION GAP 13 mmol/L      BUN 40 mg/dL      Creatinine 6 87 mg/dL      Glucose 154 mg/dL      Calcium 8 8 mg/dL      Corrected Calcium 9 3 mg/dL      AST 24 U/L      ALT 43 U/L      Alkaline Phosphatase 121 U/L      Total Protein 7 5 g/dL      Albumin 3 4 g/dL      Total Bilirubin 0 54 mg/dL      eGFR 7 ml/min/1 73sq m     Narrative:      [place] guidelines for Chronic Kidney Disease (CKD):     Stage 1 with normal or high GFR (GFR > 90 mL/min/1 73 square meters)    Stage 2 Mild CKD (GFR = 60-89 mL/min/1 73 square meters)    Stage 3A Moderate CKD (GFR = 45-59 mL/min/1 73 square meters)    Stage 3B Moderate CKD (GFR = 30-44 mL/min/1 73 square meters)    Stage 4 Severe CKD (GFR = 15-29 mL/min/1 73 square meters)    Stage 5 End Stage CKD (GFR <15 mL/min/1 73 square meters)  Note: GFR calculation is accurate only with a steady state creatinine    CBC and differential [[id number]]  (Abnormal) Collected: 07/06/22 1856    Lab Status: Final result Specimen: Blood from Arm, Left Updated: 07/06/22 1903     WBC 12 42 Thousand/uL      RBC 3 76 Million/uL      Hemoglobin 10 8 g/dL      Hematocrit 33 4 %      MCV 89 fL      MCH 28 7 pg      MCHC 32 3 g/dL      RDW 16 7 %      MPV 9 8 fL      Platelets 082 Thousands/uL      nRBC 0 /100 WBCs      Neutrophils Relative 78 %      Immat GRANS % 1 %      Lymphocytes Relative 8 %      Monocytes Relative 10 %      Eosinophils Relative 3 %      Basophils Relative 0 %      Neutrophils Absolute 9 69 Thousands/µL      Immature Grans Absolute 0 08 Thousand/uL      Lymphocytes Absolute 1 01 Thousands/µL      Monocytes Absolute 1 21 Thousand/µL      Eosinophils Absolute 0 40 Thousand/µL      Basophils Absolute 0 03 Thousands/µL                  CT abdomen pelvis wo contrast   Final Result by Brandee Medina MD (07/06 2020)      No bowel obstruction  Colonic diverticulosis without acute diverticulitis  Healing T10 vertebral body fracture  Minimal bladder wall thickening  Underlying cystitis not excluded  Additional findings as above  Workstation performed: [id number]                    Procedures  Procedures         ED Course                                             MDM    Disposition  Final diagnoses:   Left leg pain   Lymphadenopathy, inguinal   Cystitis   Pedal edema     Time reflects when diagnosis was documented in both MDM as applicable and the Disposition within this note     Time User Action Codes Description Comment    7/6/2022  9:30 PM [place] Splinter Add [L66 113] Left leg pain     7/6/2022  9:31 PM Jackie Splinter Add [R59 0] Lymphadenopathy, inguinal     7/6/2022  9:31 PM [place] Splinter Add [N30 90] Cystitis     7/6/2022  9:31 PM Jackie Splinter Add [R60 0] Pedal edema       ED Disposition     ED Disposition   Admit    Condition   Stable    Date/Time   Wed Jul 6, 2022  9:30 PM    Comment   Case was discussed with Robert and the patient's admission status was agreed to be Admission Status: observation status to the service of Dr Kirk Wilson              Follow-up Information    None         Patient's Medications Discharge Prescriptions    No medications on file       No discharge procedures on file      [address] Review     None          ED Provider  Electronically Signed by           Juliet Valdes DO  07/06/22 2134       Juliet Valdes, [facility]  07/06/22 3195

## 2022-07-07 NOTE — CONSULTS
Chief Complaint     Left Groin Pain      History of the Present Illness     Fabby Knutson is a 60 y o  male who describes left groin pain that just started today  Per patient, he states he never really had pain here before  He describes not feeling well a couple days ago after dialysis and had a low grade fever, but states this was just the one day and he attributed it to his dialysis  Patient denies injury  He describes his pain as being along the groin and that it can occasionally radiate towards the back of the hip  He does have chronic DM neuropathy in the feet without notable worsening of symptoms  He states he is not able to move the leg without severe pain  Past Medical History:   Diagnosis Date    CKD     Diabetes mellitus ([facility] [address] )     Hypertension     [facility] toe amputee ([facility] [address] [facility]     TIA (transient ischemic attack)        Past Surgical History:   Procedure Laterality Date    IR TUNNELED DIALYSIS CATHETER PLACEMENT  1/27/2022       No Known Allergies    No current facility-administered medications on file prior to encounter       Current Outpatient Medications on File Prior to Encounter   Medication Sig Dispense Refill    allopurinol (ZYLOPRIM) 100 mg tablet Take 100 mg by mouth daily      carvedilol (COREG) 6 25 mg tablet Take 12 5 mg by mouth 2 (two) times a day      NIFEdipine ER (ADALAT CC) 30 MG 24 hr tablet Take 30 mg by mouth 2 (two) times a day      sevelamer (RENAGEL) 800 mg tablet Take 1 tablet (800 mg total) by mouth 3 (three) times a day with meals  0    acetaminophen (TYLENOL) 325 mg tablet Take 2 tablets (650 mg total) by mouth every 6 (six) hours as needed for mild pain, headaches or fever  0    atorvastatin (LIPITOR) 80 mg tablet Take 80 mg by mouth daily      clopidogrel (PLAVIX) 75 mg tablet Take 75 mg by mouth daily      lidocaine (LIDODERM) 5 % Apply 1 patch topically daily Lower back - Remove & Discard patch within 12 hours or as directed by MD  0    linaGLIPtin 5 MG TABS Take 5 mg by mouth in the morning  0    oxyCODONE (ROXICODONE) 5 immediate release tablet Take 0 5 tablets (2 5 mg total) by mouth every 6 (six) hours as needed for moderate pain Max Daily Amount: 10 mg  0    oxyCODONE (ROXICODONE) 5 immediate release tablet Take 1 tablet (5 mg total) by mouth every 6 (six) hours as needed for severe pain Max Daily Amount: 20 mg  0       Social History     Tobacco Use    Smoking status: Never Smoker    Smokeless tobacco: Never Used   Vaping Use    Vaping Use: Never used   Substance Use Topics    Alcohol use: Not Currently     Alcohol/week: 0 0 standard drinks     Comment: 0    Drug use: Never       History reviewed  No pertinent family history  Review of Systems     As stated in the HPI  All other systems were reviewed and are negative  Physical Exam     /61   Pulse 64   Temp 99 4 °F (37 4 °C) (Tympanic)   Resp 20   Ht 6' 1" (1 854 m)   Wt 106 kg (233 lb 11 oz)   SpO2 91%   BMI 30 83 kg/m²     GENERAL: This is a well-developed, well-nourished, age-appropriate patient in no acute distress  The patient is alert and oriented x3  Pleasant and cooperative  Eyes: Anicteric sclerae  Extraocular movements appear intact  HENT: Nares are patent with no drainage  Lungs: There is equal chest rise on inspection  Breathing is non-labored with no audible wheezing  Cardiovascular: No cyanosis  No upper extremity lymphadema  Skin: Skin is warm to touch  No obvious skin lesions or rashes other than described below  Neurologic: No ataxia  Psychiatric: Mood and affect are appropriate  Left Lower Extremity:  Skin intact over hip  No erythema, fluctuance, ecchymosis  Patient describes ttp along the inner groin  He does have palpable enlarged lymph node here and describes pain when this is palpated  Patient denies pain with micromotion log roll testing  Patient states unable to flex at hip 2/2 pain  Nontender along the knee/ankle/foot  Calf soft, nontender  SILT throughout the lower extremity except slightly in the foot (h/o chronic neuropathy)  +EHL/FHL, +ankle dorsi/plantar flexion  Limb well-perfused      Data Review     Results Reviewed     Procedure Component Value Units Date/Time    Urine Microscopic [[id number]]  (Normal) Collected: 07/06/22 2209    Lab Status: Final result Specimen: Urine, Other Updated: 07/06/22 2224     RBC, UA 0-1 /hpf      WBC, UA None Seen /hpf      Epithelial Cells None Seen /hpf      Bacteria, UA None Seen /hpf     UA (URINE) with reflex to Scope [[id number]]  (Abnormal) Collected: 07/06/22 2209    Lab Status: Final result Specimen: Urine, Other Updated: 07/06/22 2216     Color, UA Light Yellow     Clarity, UA Clear     Specific [place], UA 1 020     pH, UA 8 0     Leukocytes, UA Negative     Nitrite, UA Negative     Protein, UA >=300 mg/dl      Glucose,  (1/10%) mg/dl      Ketones, UA Negative mg/dl      Urobilinogen, UA 0 2 E U /dl      Bilirubin, UA Negative     Occult Blood, UA Trace-Intact    Comprehensive metabolic panel [[id number]]  (Abnormal) Collected: 07/06/22 1856    Lab Status: Final result Specimen: Blood from Arm, Left Updated: 07/06/22 1920     Sodium 138 mmol/L      Potassium 4 3 mmol/L      Chloride 100 mmol/L      CO2 25 mmol/L      ANION GAP 13 mmol/L      BUN 40 mg/dL      Creatinine 6 87 mg/dL      Glucose 154 mg/dL      Calcium 8 8 mg/dL      Corrected Calcium 9 3 mg/dL      AST 24 U/L      ALT 43 U/L      Alkaline Phosphatase 121 U/L      Total Protein 7 5 g/dL      Albumin 3 4 g/dL      Total Bilirubin 0 54 mg/dL      eGFR 7 ml/min/1 73sq m     Narrative:      [employer] guidelines for Chronic Kidney Disease (CKD):     Stage 1 with normal or high GFR (GFR > 90 mL/min/1 73 square meters)    Stage 2 Mild CKD (GFR = 60-89 mL/min/1 73 square meters)    Stage 3A Moderate CKD (GFR = 45-59 mL/min/1 73 square meters)    Stage 3B Moderate CKD (GFR = 30-44 mL/min/1 73 square meters)   Stage 4 Severe CKD (GFR = 15-29 mL/min/1 73 square meters)    Stage 5 End Stage CKD (GFR <15 mL/min/1 73 square meters)  Note: GFR calculation is accurate only with a steady state creatinine    CBC and differential [[id number]]  (Abnormal) Collected: 07/06/22 1856    Lab Status: Final result Specimen: Blood from Arm, Left Updated: 07/06/22 1903     WBC 12 42 Thousand/uL      RBC 3 76 Million/uL      Hemoglobin 10 8 g/dL      Hematocrit 33 4 %      MCV 89 fL      MCH 28 7 pg      MCHC 32 3 g/dL      RDW 16 7 %      MPV 9 8 fL      Platelets 451 Thousands/uL      nRBC 0 /100 WBCs      Neutrophils Relative 78 %      Immat GRANS % 1 %      Lymphocytes Relative 8 %      Monocytes Relative 10 %      Eosinophils Relative 3 %      Basophils Relative 0 %      Neutrophils Absolute 9 69 Thousands/µL      Immature Grans Absolute 0 08 Thousand/uL      Lymphocytes Absolute 1 01 Thousands/µL      Monocytes Absolute 1 21 Thousand/µL      Eosinophils Absolute 0 40 Thousand/µL      Basophils Absolute 0 03 Thousands/µL              Imaging:  CT of the abdomen/pelvis reviewed which demonstrates significant degenerative changes about the left hip joint  No obvious signs of fluid collection in the joint or surrounding tissues  Assessment and Plan        60year old male with left groin pain, likely 2/2 significant OA versus enlarged inguinal lymph node seen on U/S  Patient is not a candidate for steroid injection at this time  Medical management for arthritic pain per primary team  He also has some tenderness along the enlarged inguinal lymph node, so cannot rule this out as a source  Patient currently displaying no signs of infection on imaging studies or with his vitals, so suspicion for joint infection is low  However, if patient develops a fever or increased WBC count, would recommend IR aspiration of the hip joint versus MRI for further evaluation   Patient states he is severely claustrophobic and would need his head to remain out of the MRI to get this done   Please notify ortho team of any changes in patient's exam

## 2022-07-07 NOTE — ASSESSMENT & PLAN NOTE
Patient presents with left inguinal pain beginning this morning  · CT abdomen/pelvis shows minimal bladder wall thickening, possible underlying cystitis, stable small bilateral groin lymph nodes  No hernia noted  · Possibly due to cystitis vs muscle strain  · WBC 12 42  · Given ceftriaxone in ED, will continue for now  · Check UA  · Pain medication p r n    · PT/OT eval

## 2022-07-07 NOTE — PHYSICAL THERAPY NOTE
PT evaluation attempted but pt declined by pt due to c/o uncontrolled pain  RN aware  Will follow    Jay Barton PT  [id number]

## 2022-07-07 NOTE — H&P
Bharat 1960, 60 y o  male MRN: [id number]  Unit/Bed#: 2 [place] [address] Encounter: [id number]  Primary Care Provider: Maday Bennett MD   Date and time admitted to hospital: 7/6/2022  6:21 PM    * Left inguinal pain  Assessment & Plan  Patient presents with left inguinal pain beginning this morning  · CT abdomen/pelvis shows minimal bladder wall thickening, possible underlying cystitis, stable small bilateral groin lymph nodes  No hernia noted  · Possibly due to cystitis vs muscle strain  · WBC 12 42  · Given ceftriaxone in ED, will continue for now  · Check UA  · Pain medication p r n  · PT/OT eval    Left leg swelling  Assessment & Plan  Patient noted to have left LE swelling > right LE x1 week  · Venous duplex ordered      ESRD (end stage renal disease) [facility])  Assessment & Plan  Lab Results   Component Value Date    EGFR 7 07/06/2022    EGFR 11 02/02/2022    EGFR 13 02/01/2022    CREATININE 6 87 (H) 07/06/2022    CREATININE 5 13 (H) 02/02/2022    CREATININE 4 33 (H) 02/01/2022   ESRD on HD T, Th, Sat  · Last session yesterday, only completed half of treatment  · Consult nephrology  · Will need hemodialysis tomorrow    Type 2 diabetes mellitus, without long-term current use of insulin [facility])  Assessment & Plan  Lab Results   Component Value Date    HGBA1C 6 6 (H) 01/24/2022       Recent Labs     07/06/22  2242   POCGLU 124       Blood Sugar Average: Last 72 hrs:  (P) 124    Check HgA1c   Diabetic diet   Start SSI and accuchecks ac, hs      Anemia  Assessment & Plan  Hemoglobin 10 8, stable  · Monitor with CBC    TIA (transient ischemic attack)  Assessment & Plan  Continue statin, Plavix    Hypertension  Assessment & Plan  Continue carvedilol, nifedipine    VTE Pharmacologic Prophylaxis: VTE Score: 3 Moderate Risk (Score 3-4) - Pharmacological DVT Prophylaxis Ordered: heparin    Code Status: Full code  Discussion with family: Patient declined call to contact person  Anticipated Length of Stay: Patient will be admitted on an observation basis with an anticipated length of stay of less than 2 midnights secondary to groin pain, leg swelling  Total Time for Visit, including Counseling / Coordination of Care: 45 minutes Greater than 50% of this total time spent on direct patient counseling and coordination of care  Chief Complaint: groin pain    History of Present Illness:  Fabby Knutson is a 60 y o  male with a PMH of ESRD on HD T, Th, Sat, DM 2, hypertension, history of TIA who presents with current pain  Patient states that he woke up this morning with left-sided groin pain which has progressively gotten worse  He denies any fall or injury  Denies any pain in his penis, scrotum, no abdominal pain, no dysuria or hematuria  Denies any numbness, tingling or incontinence  He states the pain is minimal when lying still but has severe pain with movement  He states he had dialysis yesterday and had to stop correction through due to not feeling well  He also has some left foot and ankle swelling that he states has been there for about a week  Denies any calf pain  Denies any fevers, chills, chest pain, palpitations, shortness of breath, cough, abdominal pain, constipation/diarrhea  Patient states that he is still having severe pain with movement and is unable to walk secondary to pain  He states he will not be able to take care of himself at home and will be admitted for observation  Review of Systems:  Review of Systems   Constitutional: Negative for chills and fever  Eyes: Negative for pain and visual disturbance  Respiratory: Negative for cough and shortness of breath  Cardiovascular: Negative for chest pain and palpitations  Gastrointestinal: Negative for abdominal pain and vomiting  Genitourinary: Negative for dysuria and hematuria  Musculoskeletal: Positive for arthralgias and gait problem  Negative for back pain     Skin: Negative for color change and rash  Neurological: Negative for dizziness and headaches  All other systems reviewed and are negative  Past Medical and Surgical History:   Past Medical History:   Diagnosis Date    CKD     [facility] [address]     Hypertension     [facility] toe amput[facility] [address]     TIA (transient ischemic attack)        Past Surgical History:   Procedure Laterality Date    IR TUNNELED DIALYSIS CATHETER PLACEMENT  1/27/2022       Meds/Allergies:  Prior to Admission medications    Medication Sig Start Date End Date Taking?  Authorizing Provider   allopurinol (ZYLOPRIM) 100 mg tablet Take 100 mg by mouth daily 11/2/21 7/6/22 Yes Historical Provider, MD   carvedilol (COREG) 6 25 mg tablet Take 12 5 mg by mouth 2 (two) times a day 11/16/21  Yes Historical Provider, MD   NIFEdipine ER (ADALAT CC) 30 MG 24 hr tablet Take 30 mg by mouth 2 (two) times a day 11/15/21  Yes Historical Provider, MD   sevelamer (RENAGEL) 800 mg tablet Take 1 tablet (800 mg total) by mouth 3 (three) times a day with meals 2/2/22  Yes Lore Silver DO   acetaminophen (TYLENOL) 325 mg tablet Take 2 tablets (650 mg total) by mouth every 6 (six) hours as needed for mild pain, headaches or fever 2/2/22   Lore Silver DO   atorvastatin (LIPITOR) 80 mg tablet Take 80 mg by mouth daily 11/2/21 1/31/22  Historical Provider, MD   clopidogrel (PLAVIX) 75 mg tablet Take 75 mg by mouth daily 11/2/21   Historical Provider, MD   lidocaine (LIDODERM) 5 % Apply 1 patch topically daily Lower back - Remove & Discard patch within 12 hours or as directed by MD 2/3/22   Lore Silver DO   linaGLIPtin 5 MG TABS Take 5 mg by mouth in the morning 2/2/22   Lore Silver DO   oxyCODONE (ROXICODONE) 5 immediate release tablet Take 0 5 tablets (2 5 mg total) by mouth every 6 (six) hours as needed for moderate pain Max Daily Amount: 10 mg 2/2/22   Lore Silver DO   oxyCODONE (ROXICODONE) 5 immediate release tablet Take 1 tablet (5 mg total) by mouth every 6 (six) hours as needed for severe pain Max Daily Amount: 20 mg 2/2/22   Lore Silver,      I have reviewed home medications with patient personally  Allergies: No Known Allergies    Social History:  Marital Status:    Occupation:   Patient Pre-hospital Living Situation: Home  Patient Pre-hospital Level of Mobility: walks  Patient Pre-hospital Diet Restrictions: none  Substance Use History:   Social History     Substance and Sexual Activity   Alcohol Use Not Currently    Alcohol/week: 0 0 standard drinks    Comment: 0     Social History     Tobacco Use   Smoking Status Never Smoker   Smokeless Tobacco Never Used     Social History     Substance and Sexual Activity   Drug Use Never       Family History:  History reviewed  No pertinent family history  Physical Exam:     Vitals:   Blood Pressure: 152/67 (07/06/22 2340)  Pulse: 82 (07/06/22 2340)  Temperature: 100 3 °F (37 9 °C) (07/06/22 2340)  Temp Source: Oral (07/06/22 1822)  Respirations: 20 (07/06/22 2340)  Height: 6' 1" (185 4 cm) (07/06/22 2244)  Weight - Scale: 106 kg (233 lb 11 oz) (07/06/22 2244)  SpO2: 98 % (07/06/22 2340)    Physical Exam  Vitals and nursing note reviewed  Constitutional:       Appearance: He is well-developed  HENT:      Head: Normocephalic and atraumatic  Eyes:      Conjunctiva/sclera: Conjunctivae normal    Cardiovascular:      Rate and Rhythm: Normal rate and regular rhythm  Heart sounds: No murmur heard  Pulmonary:      Effort: Pulmonary effort is normal  No respiratory distress  Breath sounds: Normal breath sounds  Abdominal:      Palpations: Abdomen is soft  Tenderness: There is no abdominal tenderness  There is no right CVA tenderness or left CVA tenderness  Genitourinary:     Comments: Left inguinal tenderness  Musculoskeletal:      Cervical back: Neck supple  Comments: Left leg, foot swelling > right   Skin:     General: Skin is warm and dry     Neurological:      Mental Status: He is alert and oriented to person, place, and time  Additional Data:     Lab Results:  Results from last 7 days   Lab Units 07/06/22  1856   WBC Thousand/uL 12 42*   HEMOGLOBIN g/dL 10 8*   HEMATOCRIT % 33 4*   PLATELETS Thousands/uL 248   NEUTROS PCT % 78*   LYMPHS PCT % 8*   MONOS PCT % 10   EOS PCT % 3     Results from last 7 days   Lab Units 07/06/22  1856   SODIUM mmol/L 138   POTASSIUM mmol/L 4 3   CHLORIDE mmol/L 100   CO2 mmol/L 25   BUN mg/dL 40*   CREATININE mg/dL 6 87*   ANION GAP mmol/L 13   CALCIUM mg/dL 8 8   ALBUMIN g/dL 3 4*   TOTAL BILIRUBIN mg/dL 0 54   ALK PHOS U/L 121*   ALT U/L 43   AST U/L 24   GLUCOSE RANDOM mg/dL 154*         Results from last 7 days   Lab Units 07/06/22  2242   POC GLUCOSE mg/dl 124               Imaging: Reviewed radiology reports from this admission including: abdominal/pelvic CT  CT abdomen pelvis wo contrast   Final Result by Yolanda Rodriguez MD (07/06 2020)      No bowel obstruction  Colonic diverticulosis without acute diverticulitis  Healing T10 vertebral body fracture  Minimal bladder wall thickening  Underlying cystitis not excluded  Additional findings as above  Workstation performed: [id number]         VAS lower limb venous duplex study, complete bilateral    (Results Pending)       EKG and Other Studies Reviewed on Admission:   ·     ** Please Note: This note has been constructed using a voice recognition system   **

## 2022-07-07 NOTE — PLAN OF CARE
Problem: Potential for Falls  Goal: Patient will remain free of falls  Description: INTERVENTIONS:  - Educate patient/family on patient safety including physical limitations  - Instruct patient to call for assistance with activity   - Consult OT/PT to assist with strengthening/mobility   - Keep Call bell within reach  - Keep bed low and locked with side rails adjusted as appropriate  - Keep care items and personal belongings within reach  - Initiate and maintain comfort rounds  - Make Fall Risk Sign visible to staff  - Offer Toileting every 2 Hours, in advance of need  - Initiate/Maintain bed alarm  - Obtain necessary fall risk management equipment: prosthetic / crutches    - Apply yellow socks and bracelet for high fall risk patients  - Consider moving patient to room near nurses station  Outcome: Progressing     Problem: MOBILITY - ADULT  Goal: Maintain or return to baseline ADL function  Description: INTERVENTIONS:  -  Assess patient's ability to carry out ADLs; assess patient's baseline for ADL function and identify physical deficits which impact ability to perform ADLs (bathing, care of mouth/teeth, toileting, grooming, dressing, etc )  - Assess/evaluate cause of self-care deficits   - Assess range of motion  - Assess patient's mobility; develop plan if impaired  - Assess patient's need for assistive devices and provide as appropriate  - Encourage maximum independence but intervene and supervise when necessary  - Involve family in performance of ADLs  - Assess for home care needs following discharge   - Consider OT consult to assist with ADL evaluation and planning for discharge  - Provide patient education as appropriate  Outcome: Progressing  Goal: Maintains/Returns to pre admission functional level  Description: INTERVENTIONS:  - Perform BMAT or MOVE assessment daily    - Set and communicate daily mobility goal to care team and patient/family/caregiver     - Collaborate with rehabilitation services on mobility goals if consulted  - Perform Range of Motion 2 times a day  - Reposition patient every 2 hours    - Dangle patient 2 times a day  - Stand patient 1 times a day  - Ambulate patient 2 times a day  - Out of bed to chair 2 times a day   - Out of bed for meals 2 times a day  - Out of bed for toileting  - Record patient progress and toleration of activity level   Outcome: Progressing

## 2022-07-07 NOTE — ASSESSMENT & PLAN NOTE
Lab Results   Component Value Date    EGFR 7 07/06/2022    EGFR 11 02/02/2022    EGFR 13 02/01/2022    CREATININE 6 87 (H) 07/06/2022    CREATININE 5 13 (H) 02/02/2022    CREATININE 4 33 (H) 02/01/2022   ESRD on HD T, Th, Sat  · Last session yesterday, only completed half of treatment  · Consult nephrology  · Will need hemodialysis tomorrow

## 2022-07-07 NOTE — CONSULTS
[address] 60 y o  male MRN: [id number]  Unit/Bed#: 2 [place] [address] Encounter: [id number]    ASSESSMENT and PLAN:    65-year-old male with a history of advanced chronic kidney disease currently dialysis dependent who presents for groin/inguinal pain  Nephrology consult for dialysis management  End-stage renal disease  --requiring chronic incenter hemodialysis Tuesday Thursday Saturday at [facility]  --has been dialysis dependent since February with no evidence of renal recovery  --still has some residual renal function and makes urine  --underwent his last dialysis this past Tuesday  --plan for dialysis today  --obtain outpatient hemodialysis records  --access:  Right IJ PermCath  --overall stable from a renal standpoint for discharge once team stable from a medical standpoint  --discussed with hospitalist    Anemia of chronic kidney disease  --close to goal  --check SURINDER status    Left groin pain  --management as per the medicine team   Plan for orthopedic evaluation    Blood pressure/volume status  --examines euvolemic  --history of hypertension  --presented with accelerated hypertension which has now improved currently normotensive    Coronary artery disease  --continue carvedilol, atorvastatin, Plavix    Mineral bone disorder-chronic kidney disease  --low phosphorus diet, continue phosphate binder    SUMMARY OF RECOMMENDATIONS:    Please see above    HISTORY OF PRESENT ILLNESS:  Requesting Physician: Aman Turk DO  Reason for Consult:  ESRD    Rosi Levin is a 60 y o  male who was admitted to [facility] after presenting with left inguinal/groin pain  A renal consultation is requested today for assistance in the management of ESRD  Patient presented for acute left inguinal groin pain  He underwent dialysis this past Tuesday and reported no issues except started to have some flu-like symptoms  He is currently vaccinated for COVID-19    The pain became significant and worsened and he presents the hospital   Imaging showed thickening bladder with concerns of possible reactive lymph node as opposed to any hernia  Orthopedics plan to be consulted  Patient reports is the worst pain he has ever had  No chest pain or shortness of breath  PAST MEDICAL HISTORY:  Past Medical History:   Diagnosis Date    CKD     Diabetes mellitus ([facility] [address] [facility]     Hypertension     [facility] toe amputee ([facility] [address] [facility]     TIA (transient ischemic attack)        PAST SURGICAL HISTORY:  Past Surgical History:   Procedure Laterality Date    IR TUNNELED DIALYSIS CATHETER PLACEMENT  1/27/2022       ALLERGIES:  No Known Allergies    SOCIAL HISTORY:  Social History     Substance and Sexual Activity   Alcohol Use Not Currently    Alcohol/week: 0 0 standard drinks    Comment: 0     Social History     Substance and Sexual Activity   Drug Use Never     Social History     Tobacco Use   Smoking Status Never Smoker   Smokeless Tobacco Never Used       FAMILY HISTORY:  History reviewed  No pertinent family history      MEDICATIONS:    Current Facility-Administered Medications:     acetaminophen (TYLENOL) tablet 650 mg, 650 mg, Oral, Q6H PRN, Paulo Suggs PA-C    allopurinol (ZYLOPRIM) tablet 100 mg, 100 mg, Oral, Daily, Brina Vecellio, PA-C, 100 mg at 07/07/22 0800    atorvastatin (LIPITOR) tablet 80 mg, 80 mg, Oral, HS, Brina Vecellio, PA-C, 80 mg at 07/06/22 2257    carvedilol (COREG) tablet 12 5 mg, 12 5 mg, Oral, BID, Brina Vecellio, PA-C, 12 5 mg at 07/07/22 0800    clopidogrel (PLAVIX) tablet 75 mg, 75 mg, Oral, Daily, Brina Vecellio, PA-C, 75 mg at 07/07/22 0800    heparin (porcine) subcutaneous injection 5,000 Units, 5,000 Units, Subcutaneous, Q8H [facility], Brina Vecellio, PA-C, 5,000 Units at 07/07/22 0511    insulin lispro (HumaLOG) 100 units/mL subcutaneous injection 1-6 Units, 1-6 Units, Subcutaneous, TID AC **AND** Fingerstick Glucose (POCT), , , TID AC, Brinacarmen Milton, PA-KAREEM    insulin lispro (HumaLOG) 100 units/mL subcutaneous injection 1-6 Units, 1-6 Units, Subcutaneous, HS, CARSON Jama-C    NIFEdipine (PROCARDIA XL) 24 hr tablet 30 mg, 30 mg, Oral, BID, Brina Vecvahido, PA-C, 30 mg at 07/07/22 0800    ondansetron (ZOFRAN) injection 4 mg, 4 mg, Intravenous, Q6H PRN, Ashwini Ortega PA-C    oxyCODONE (ROXICODONE) immediate release tablet 10 mg, 10 mg, Oral, Q4H PRN, Mikael Torres DO    oxyCODONE (ROXICODONE) IR tablet 5 mg, 5 mg, Oral, Q4H PRN, Brina Milton, PA-C, 5 mg at 07/07/22 0750    pneumococcal 23-valent polysaccharide vaccine (PNEUMOVAX-23) injection 0 5 mL, 0 5 mL, Subcutaneous, Prior to discharge, Lore Silver DO    sevelamer (RENAGEL) tablet 800 mg, 800 mg, Oral, TID With Meals, Brina Vecvahido, PA-C, 800 mg at 07/07/22 0753    REVIEW OF SYSTEMS:  Constitutional: Negative for fatigue, anorexia, fever, chills, diaphoresis  HENT: Negative for postnasal drip  Eyes: Negative for visual disturbance  Respiratory: Negative for cough, shortness of breath and wheezing  Cardiovascular: Negative for chest pain, palpitations and leg swelling  Gastrointestinal: Negative for abdominal pain, constipation, diarrhea, nausea and vomiting  Genitourinary: No dysuria, hematuria  Endocrine: Negative for polyuria  Musculoskeletal:  Left inguinal groin pain  Skin: Negative for rash  Neurological: Negative for focal weakness, headaches, dizziness  Hematological: Negative for easy bruising or bleeding  Psychiatric/Behavioral: Negative for confusion and sleep disturbance  All the systems were reviewed and were negative except as documented on the HPI      PHYSICAL EXAM:  Current Weight: Weight - Scale: 106 kg (233 lb 11 oz)  First Weight: Weight - Scale: 107 kg (235 lb 14 3 oz)  Vitals:    07/06/22 2244 07/06/22 2340 07/07/22 0313 07/07/22 0708   BP:  152/67 133/60 138/62   BP Location:       Pulse:  82 75 71   Resp:  20 20 18   Temp:  100 3 °F (37 9 °C) 99 7 °F (37 6 °C) 98 5 °F (36 9 °C)   TempSrc:       SpO2:  98% 96% 96%   Weight: 106 kg (233 lb 11 oz)      Height: 6' 1" (1 854 m)          Intake/Output Summary (Last 24 hours) at 7/7/2022 1134  Last data filed at 7/7/2022 0120  Gross per 24 hour   Intake 78 33 ml   Output 300 ml   Net -221 67 ml     Physical Exam  Vitals and nursing note reviewed  Constitutional:       General: He is not in acute distress  Appearance: He is well-developed  HENT:      Head: Normocephalic and atraumatic  Eyes:      General: No scleral icterus  Conjunctiva/sclera: Conjunctivae normal       Pupils: Pupils are equal, round, and reactive to light  Cardiovascular:      Rate and Rhythm: Normal rate and regular rhythm  Heart sounds: S1 normal and S2 normal  No murmur heard  No friction rub  No gallop  Pulmonary:      Effort: Pulmonary effort is normal  No respiratory distress  Breath sounds: Normal breath sounds  No wheezing or rales  Abdominal:      General: Bowel sounds are normal       Palpations: Abdomen is soft  Tenderness: There is no abdominal tenderness  There is no rebound  Musculoskeletal:         General: Normal range of motion  Cervical back: Normal range of motion and neck supple  Skin:     Findings: No rash  Neurological:      Mental Status: He is alert and oriented to person, place, and time     Psychiatric:         Behavior: Behavior normal            Invasive Devices:      Lab Results:   Results from last 7 days   Lab Units 07/07/22  0437 07/06/22  1856   WBC Thousand/uL 9 56 12 42*   HEMOGLOBIN g/dL 9 8* 10 8*   HEMATOCRIT % 30 7* 33 4*   PLATELETS Thousands/uL 236 248   POTASSIUM mmol/L 4 3 4 3   CHLORIDE mmol/L 98* 100   CO2 mmol/L 25 25   BUN mg/dL 41* 40*   CREATININE mg/dL 6 92* 6 87*   CALCIUM mg/dL 8 5 8 8   ALK PHOS U/L  --  121*   ALT U/L  --  43   AST U/L  --  24

## 2022-07-07 NOTE — ASSESSMENT & PLAN NOTE
· ESRD on HD TTS  · On Tuesday on a completed half treatment due to feeling ill  · Nephrology consulted for HD management    Results from last 7 days   Lab Units 07/07/22  0437 07/06/22  1856   BUN mg/dL 41* 40*   CREATININE mg/dL 6 92* 6 87*   EGFR ml/min/1 73sq m 7 7

## 2022-07-07 NOTE — PROGRESS NOTES
[address]  Progress Note - Yenni Regalado 1960, 60 y o  male MRN: [id number]  Unit/Bed#: [address] [place] [address] Encounter: [id number]  Primary Care Provider: Rosa Abbott MD   Date and time admitted to hospital: 7/6/2022  6:21 PM    * Left inguinal pain  Assessment & Plan  57-year-old man with a history of ESRD on HD TTS diabetes and hypertension presented to the hospital with worsening left hip/inguinal pain  · Appears musculoskeletal   Severe left hip OA noted  · Does have lymphadenopathy which will need repeat imaging as outpatient to ensure resolution  · Question of cystitis however given negative urinalysis, antibiotics were not reordered  · Appreciate orthopedic evaluation  Continue PT   · Continue oxycodone  Add tizanidine    Type 2 diabetes mellitus, without long-term current use of insulin [facility])  Assessment & Plan  Lab Results   Component Value Date    HGBA1C 6 6 (H) 07/07/2022     Recent Labs     07/06/22  2242 07/07/22  0716 07/07/22  1137 07/07/22  1523   POCGLU 124 108 114 130     · Prior to admission on linagliptin  Continue sliding scale during hospitalization      Left leg swelling  Assessment & Plan  · Venous duplex ordered without DVT    History of TIA (transient ischemic attack)  Assessment & Plan  · Continue clopidogrel and atorvastatin    Hypertension  Assessment & Plan  · Continue carvedilol and nifedipine    ESRD (end stage renal disease) ([facility] [address] )  Assessment & Plan  · ESRD on HD TTS  · On Tuesday on a completed half treatment due to feeling ill  · Nephrology consulted for HD management    Results from last 7 days   Lab Units 07/07/22  0437 07/06/22  1856   BUN mg/dL 41* 40*   CREATININE mg/dL 6 92* 6 87*   EGFR ml/min/1 73sq m 7 7       VTE Pharmacologic Prophylaxis: VTE Score: 3 Moderate Risk (Score 3-4) - Pharmacological DVT Prophylaxis Ordered: Heparin  Patient Centered Rounds: I have performed bedside rounds with nursing staff today    Discussions with Specialists or Other Care Team Provider:  Nephrology    Education and Discussions with Family / Patient:  Called daughter, left voicemail    Time Spent for Care: 25 mins  More than 50% of total time spent on counseling and coordination of care as described above  Current Length of Stay: 0 day(s)  Current Patient Status: Inpatient   Certification Statement: The patient will continue to require additional inpatient hospital stay due to ambulatory dysfunction  Discharge Plan / Estimated Discharge Date: Anticipate discharge in 48 hrs to discharge location to be determined pending rehab evaluations  Code Status: Level 1 - Full Code      Subjective:   Patient seen and examined  Still having terrible left hip pains  Denies any back pain  Denies injury    Objective:   Vitals: Blood pressure 138/62, pulse 71, temperature 98 5 °F (36 9 °C), resp  rate 18, height 6' 1" (1 854 m), weight 106 kg (233 lb 11 oz), SpO2 96 %  Physical Exam  Vitals reviewed  Constitutional:       General: He is not in acute distress  HENT:      Head: Atraumatic  Cardiovascular:      Rate and Rhythm: Regular rhythm  Pulmonary:      Effort: Pulmonary effort is normal       Breath sounds: Decreased breath sounds present  No wheezing  Abdominal:      General: Bowel sounds are normal       Palpations: Abdomen is soft  Tenderness: There is no abdominal tenderness  There is no rebound  Musculoskeletal:         General: Tenderness (left hip) present  No swelling  Skin:     General: Skin is warm and dry  Neurological:      General: No focal deficit present  Mental Status: He is alert  Cranial Nerves: No cranial nerve deficit     Psychiatric:         Mood and Affect: Mood normal        Additional Data:   Labs:  Results from last 7 days   Lab Units 07/07/22  0437 07/06/22  1856   WBC Thousand/uL 9 56 12 42*   HEMOGLOBIN g/dL 9 8* 10 8*   HEMATOCRIT % 30 7* 33 4*   MCV fL 90 89   PLATELETS Thousands/uL 236 248 Results from last 7 days   Lab Units 07/07/22  0437 07/06/22  1856   SODIUM mmol/L 135* 138   POTASSIUM mmol/L 4 3 4 3   CHLORIDE mmol/L 98* 100   CO2 mmol/L 25 25   ANION GAP mmol/L 12 13   BUN mg/dL 41* 40*   CREATININE mg/dL 6 92* 6 87*   CALCIUM mg/dL 8 5 8 8   ALBUMIN g/dL  --  3 4*   TOTAL BILIRUBIN mg/dL  --  0 54   ALK PHOS U/L  --  121*   ALT U/L  --  43   AST U/L  --  24   EGFR ml/min/1 73sq m 7 7   GLUCOSE RANDOM mg/dL 113 154*                          Results from last 7 days   Lab Units 07/07/22  1137 07/07/22  0716 07/06/22  2242   POC GLUCOSE mg/dl 114 108 124     Results from last 7 days   Lab Units 07/07/22  0437   HEMOGLOBIN A1C % 6 6*         * I Have Reviewed All Lab Data Listed Above  Cultures:                   Lines/Drains:  Invasive Devices  Report    Peripheral Intravenous Line  Duration           Peripheral IV 07/06/22 Distal;Left;Upper;Ventral (anterior) Arm <1 day          Hemodialysis Catheter  Duration           HD Permanent Double Catheter 160 days              Telemetry:      Imaging:  Imaging Reports Reviewed Today Include:   CT abdomen pelvis wo contrast    Result Date: 7/6/2022  Impression: No bowel obstruction  Colonic diverticulosis without acute diverticulitis  Healing T10 vertebral body fracture  Minimal bladder wall thickening  Underlying cystitis not excluded  Additional findings as above   Workstation performed: [id number]       Scheduled Meds:  Current Facility-Administered Medications   Medication Dose Route Frequency Provider Last Rate    acetaminophen  650 mg Oral Q6H PRN Elias Rodney PA-C      allopurinol  100 mg Oral Daily Brina Milton PA-C      atorvastatin  80 mg Oral HS Brina Milton PA-C      carvedilol  12 5 mg Oral BID Elias Rodney PA-C      clopidogrel  75 mg Oral Daily Brina Milton PA-C      heparin (porcine)  5,000 Units Subcutaneous Q8H [facility] Brina Milton PA-C      insulin lispro  1-6 Units Subcutaneous TID AC Ashwini Ortega PA-C      insulin lispro  1-6 Units Subcutaneous HS Brina Milton PA-C      NIFEdipine  30 mg Oral BID Ashwini Ortega PA-C      ondansetron  4 mg Intravenous Q6H PRN Ashwini Ortega PA-C      oxyCODONE  10 mg Oral Q4H PRN Mikael Torres DO      oxyCODONE  5 mg Oral Q4H PRN Ashwini Ortega PA-C      pneumococcal 23-valent polysaccharide vaccine  0 5 mL Subcutaneous Prior to discharge Lore Silver DO      sevelamer  800 mg Oral TID With Meals Ashwini Ortega PA-C         Today, Patient Was Seen By: Mikael Torres DO    ** Please Note: Dictation voice to text software may have been used in the creation of this document   ** Detail Level: Generalized

## 2022-07-07 NOTE — ASSESSMENT & PLAN NOTE
60-year-old man with a history of ESRD on HD TTS diabetes and hypertension presented to the hospital with worsening left hip/inguinal pain  · Appears musculoskeletal   Severe left hip OA noted  · Does have lymphadenopathy which will need repeat imaging as outpatient to ensure resolution  · Question of cystitis however given negative urinalysis, antibiotics were not reordered  · Appreciate orthopedic evaluation  Continue PT   · Continue oxycodone    Add tizanidine

## 2022-07-08 LAB
GLUCOSE SERPL-MCNC: 127 MG/DL (ref 65–140)
GLUCOSE SERPL-MCNC: 183 MG/DL (ref 65–140)
GLUCOSE SERPL-MCNC: 233 MG/DL (ref 65–140)
GLUCOSE SERPL-MCNC: 88 MG/DL (ref 65–140)
MRSA NOSE QL CULT: NORMAL

## 2022-07-08 PROCEDURE — 93970 EXTREMITY STUDY: CPT | Performed by: SURGERY

## 2022-07-08 PROCEDURE — 99232 SBSQ HOSP IP/OBS MODERATE 35: CPT | Performed by: INTERNAL MEDICINE

## 2022-07-08 PROCEDURE — 82948 REAGENT STRIP/BLOOD GLUCOSE: CPT

## 2022-07-08 RX ORDER — PREDNISONE 20 MG/1
60 TABLET ORAL DAILY
Status: COMPLETED | OUTPATIENT
Start: 2022-07-08 | End: 2022-07-09

## 2022-07-08 RX ORDER — COLCHICINE 0.6 MG/1
0.3 TABLET ORAL DAILY
Status: COMPLETED | OUTPATIENT
Start: 2022-07-08 | End: 2022-07-09

## 2022-07-08 RX ORDER — PREDNISONE 20 MG/1
40 TABLET ORAL DAILY
Status: COMPLETED | OUTPATIENT
Start: 2022-07-10 | End: 2022-07-11

## 2022-07-08 RX ORDER — PREDNISONE 10 MG/1
10 TABLET ORAL DAILY
Status: DISCONTINUED | OUTPATIENT
Start: 2022-07-14 | End: 2022-07-14 | Stop reason: HOSPADM

## 2022-07-08 RX ORDER — PREDNISONE 20 MG/1
20 TABLET ORAL DAILY
Status: COMPLETED | OUTPATIENT
Start: 2022-07-12 | End: 2022-07-13

## 2022-07-08 RX ADMIN — HYDROMORPHONE HYDROCHLORIDE 0.2 MG: 0.2 INJECTION, SOLUTION INTRAMUSCULAR; INTRAVENOUS; SUBCUTANEOUS at 16:37

## 2022-07-08 RX ADMIN — TIZANIDINE 4 MG: 2 TABLET ORAL at 16:35

## 2022-07-08 RX ADMIN — ATORVASTATIN CALCIUM 80 MG: 80 TABLET, FILM COATED ORAL at 21:27

## 2022-07-08 RX ADMIN — SEVELAMER HYDROCHLORIDE 800 MG: 800 TABLET, FILM COATED PARENTERAL at 16:35

## 2022-07-08 RX ADMIN — OXYCODONE HYDROCHLORIDE 10 MG: 10 TABLET ORAL at 10:59

## 2022-07-08 RX ADMIN — INSULIN LISPRO 1 UNITS: 100 INJECTION, SOLUTION INTRAVENOUS; SUBCUTANEOUS at 16:36

## 2022-07-08 RX ADMIN — NIFEDIPINE 30 MG: 30 TABLET, EXTENDED RELEASE ORAL at 08:01

## 2022-07-08 RX ADMIN — INSULIN LISPRO 2 UNITS: 100 INJECTION, SOLUTION INTRAVENOUS; SUBCUTANEOUS at 21:28

## 2022-07-08 RX ADMIN — CLOPIDOGREL BISULFATE 75 MG: 75 TABLET ORAL at 08:01

## 2022-07-08 RX ADMIN — TIZANIDINE 4 MG: 2 TABLET ORAL at 08:02

## 2022-07-08 RX ADMIN — OXYCODONE HYDROCHLORIDE 10 MG: 10 TABLET ORAL at 21:27

## 2022-07-08 RX ADMIN — SEVELAMER HYDROCHLORIDE 800 MG: 800 TABLET, FILM COATED PARENTERAL at 08:00

## 2022-07-08 RX ADMIN — OXYCODONE HYDROCHLORIDE 10 MG: 10 TABLET ORAL at 04:26

## 2022-07-08 RX ADMIN — ALLOPURINOL 100 MG: 100 TABLET ORAL at 08:00

## 2022-07-08 RX ADMIN — NIFEDIPINE 30 MG: 30 TABLET, EXTENDED RELEASE ORAL at 16:38

## 2022-07-08 RX ADMIN — TIZANIDINE 4 MG: 2 TABLET ORAL at 21:27

## 2022-07-08 RX ADMIN — SEVELAMER HYDROCHLORIDE 800 MG: 800 TABLET, FILM COATED PARENTERAL at 11:44

## 2022-07-08 RX ADMIN — PREDNISONE 60 MG: 20 TABLET ORAL at 11:44

## 2022-07-08 RX ADMIN — HYDROMORPHONE HYDROCHLORIDE 0.2 MG: 0.2 INJECTION, SOLUTION INTRAMUSCULAR; INTRAVENOUS; SUBCUTANEOUS at 07:22

## 2022-07-08 RX ADMIN — COLCHICINE 0.3 MG: 0.6 TABLET ORAL at 11:43

## 2022-07-08 RX ADMIN — HEPARIN SODIUM 5000 UNITS: 5000 INJECTION INTRAVENOUS; SUBCUTANEOUS at 21:27

## 2022-07-08 RX ADMIN — HYDROMORPHONE HYDROCHLORIDE 0.2 MG: 0.2 INJECTION, SOLUTION INTRAMUSCULAR; INTRAVENOUS; SUBCUTANEOUS at 11:45

## 2022-07-08 RX ADMIN — CARVEDILOL 12.5 MG: 12.5 TABLET, FILM COATED ORAL at 08:00

## 2022-07-08 RX ADMIN — CARVEDILOL 12.5 MG: 12.5 TABLET, FILM COATED ORAL at 16:37

## 2022-07-08 RX ADMIN — HEPARIN SODIUM 5000 UNITS: 5000 INJECTION INTRAVENOUS; SUBCUTANEOUS at 06:19

## 2022-07-08 RX ADMIN — HEPARIN SODIUM 5000 UNITS: 5000 INJECTION INTRAVENOUS; SUBCUTANEOUS at 14:20

## 2022-07-08 NOTE — CASE MANAGEMENT
Case Management Assessment & Discharge Planning Note    Patient name Harsh Haney  Location [facility] [address] 0 MRN [id number]  : 1960 Date 2022       Current Admission Date: 2022  Current Admission Diagnosis:Left inguinal pain   Patient Active Problem List    Diagnosis Date Noted    Left inguinal pain 2022    Left leg swelling 2022    Hemodialysis status ([facility] )     Hypervolemia     Anemia 2022    ESRD (end stage renal disease) ([facility] [address] ) 2022    Type 2 diabetes mellitus, without long-term current use of insulin ([facility] )     Hypertension     History of TIA (transient ischemic attack)     T10 vertebral fracture (HCC)       LOS (days): 1  Geometric Mean LOS (GMLOS) (days): 4 20  Days to GMLOS:3 3     OBJECTIVE:    Risk of Unplanned Readmission Score: 20 52         Current admission status: Inpatient       Preferred Pharmacy:   [address] [place] [address] [address]  [facility] [address]  Phone: [phone] Fax: [phone]    Primary Care Provider: Sierra Swenson MD    Primary Insurance: MEDICARE  Secondary Insurance:     ASSESSMENT:  [address] [facility] Representative - Friend   Primary Phone: [phone] (Mobile)                  Readmission Root Cause  30 Day Readmission: No    Patient Information  Admitted from[de-identified] Home  Mental Status: Alert  During Assessment patient was accompanied by: Not accompanied during assessment  Assessment information provided by[de-identified] Patient  Primary Caregiver: Self  Support Systems: Self, Family members  [place] of Residence: [address] [place] [address] do you live in?: [facility] entry access options   Select all that apply : Stairs  Number of steps to enter home : 8  Do the steps have railings?: Yes  Type of Current Residence: [address] [place] [address] home  Upon entering residence, is there a bedroom on the main floor (no further steps)?: Yes  Upon entering residence, is there a bathroom on the main floor (no further steps)?: Yes  In the last 12 months, was there a time when you were not able to pay the mortgage or rent on time?: No  In the last 12 months, was there a time when you did not have a steady place to sleep or slept in a shelter (including now)?: No  Living Arrangements: Lives Alone    Activities of Daily Living Prior to Admission  Functional Status: Independent  Completes ADLs independently?: Yes  Ambulates independently?: Yes  Does patient use assisted devices?: No  Does patient currently own DME?: No         Patient Information Continued  Income Source: SSI/SSD  Does patient have prescription coverage?: No  Within the past 12 months, you worried that your food would run out before you got the money to buy more : Never true  Within the past 12 months, the food you bought just didn't last and you didn't have money to get more : Never true  Food insecurity resource given?: N/A (pt states he is able to stretch his payments and afford food)  Does patient receive dialysis treatments?: Yes (fritz oneill sat at [address])         Means of Transportation  Means of Transport to Appts[de-identified] Drives Self  In the past 12 months, has lack of transportation kept you from medical appointments or from getting medications?: No  In the past 12 months, has lack of transportation kept you from meetings, work, or from getting things needed for daily living?: No  Was application for public transport provided?: N/A        DISCHARGE DETAILS:    Discharge planning discussed with[de-identified] patient  Freedom of Choice: Yes  Comments - Freedom of Choice: pt understands ability to be invovled in hospital stay and [address]  he does not need services at this time, and plan is home  he may need a lyft home as he does not know if he will have a ride    CM contacted family/caregiver?: No- see comments (pt declined)  Were Treatment Team discharge recommendations reviewed with patient/caregiver?: Yes  Did patient/caregiver verbalize understanding of patient care needs?: Yes  Were patient/caregiver advised of the risks associated with not following Treatment Team discharge recommendations?: Yes         [address]         Is the patient interested in [facility] at discharge?: No    DME Referral Provided  Referral made for DME?: No         Would you like to participate in our [address] service program?  : No - Declined    Treatment Team Recommendation: Home  Discharge Destination Plan[de-identified] Home        IMM Given (Date):: 07/08/22 (reviewed with pt, pt agreeable to plan, imm placed in scan bin)  IMM Given to[de-identified] Patient   Pt does not have PCP, Cm tried to make an appointment for pt, he states he can make his own appointment  He would like to see his previous PCP, Dr Linard Leventhal in [place]   Cm placed office phone number in avs

## 2022-07-08 NOTE — PLAN OF CARE
Problem: Potential for Falls  Goal: Patient will remain free of falls  Description: INTERVENTIONS:  - Educate patient/family on patient safety including physical limitations  - Instruct patient to call for assistance with activity   - Consult OT/PT to assist with strengthening/mobility   - Keep Call bell within reach  - Keep bed low and locked with side rails adjusted as appropriate  - Keep care items and personal belongings within reach  - Initiate and maintain comfort rounds  - Make Fall Risk Sign visible to staff  - Offer Toileting every 2 Hours, in advance of need  - Initiate/Maintain bed alarm  - Obtain necessary fall risk management equipment: alarm   - Apply yellow socks and bracelet for high fall risk patients  - Consider moving patient to room near nurses station  Outcome: Progressing     Problem: MOBILITY - ADULT  Goal: Maintain or return to baseline ADL function  Description: INTERVENTIONS:  -  Assess patient's ability to carry out ADLs; assess patient's baseline for ADL function and identify physical deficits which impact ability to perform ADLs (bathing, care of mouth/teeth, toileting, grooming, dressing, etc )  - Assess/evaluate cause of self-care deficits   - Assess range of motion  - Assess patient's mobility; develop plan if impaired  - Assess patient's need for assistive devices and provide as appropriate  - Encourage maximum independence but intervene and supervise when necessary  - Involve family in performance of ADLs  - Assess for home care needs following discharge   - Consider OT consult to assist with ADL evaluation and planning for discharge  - Provide patient education as appropriate  Outcome: Progressing  Goal: Maintains/Returns to pre admission functional level  Description: INTERVENTIONS:  - Perform BMAT or MOVE assessment daily    - Set and communicate daily mobility goal to care team and patient/family/caregiver     - Collaborate with rehabilitation services on mobility goals if consulted   Ambulate patient 2 times a day  - Out of bed to chair 2 times a day   - Out of bed for meals 2 times a day  - Out of bed for toileting  - Record patient progress and toleration of activity level   Outcome: Progressing     Problem: Prexisting or High Potential for Compromised Skin Integrity  Goal: Skin integrity is maintained or improved  Description: INTERVENTIONS:  - Identify patients at risk for skin breakdown  - Assess and monitor skin integrity  - Assess and monitor nutrition and hydration status  - Monitor labs   - Assess for incontinence   - Turn and reposition patient  - Assist with mobility/ambulation  - Relieve pressure over bony prominences  - Avoid friction and shearing  - Provide appropriate hygiene as needed including keeping skin clean and dry  - Evaluate need for skin moisturizer/barrier cream  - Collaborate with interdisciplinary team   - Patient/family teaching  - Consider wound care consult   Outcome: Progressing     Problem: Nutrition/Hydration-ADULT  Goal: Nutrient/Hydration intake appropriate for improving, restoring or maintaining nutritional needs  Description: Monitor and assess patient's nutrition/hydration status for malnutrition  Collaborate with interdisciplinary team and initiate plan and interventions as ordered  Monitor patient's weight and dietary intake as ordered or per policy  Utilize nutrition screening tool and intervene as necessary  Determine patient's food preferences and provide high-protein, high-caloric foods as appropriate       INTERVENTIONS:  - Monitor oral intake, urinary output, labs, and treatment plans  - Assess nutrition and hydration status and recommend course of action  - Evaluate amount of meals eaten  - Assist patient with eating if necessary   - Allow adequate time for meals  - Recommend/ encourage appropriate diets, oral nutritional supplements, and vitamin/mineral supplements  - Order, calculate, and assess calorie counts as needed  - Recommend, monitor, and adjust tube feedings and TPN/PPN based on assessed needs  - Assess need for intravenous fluids  - Provide specific nutrition/hydration education as appropriate  - Include patient/family/caregiver in decisions related to nutrition  Outcome: Progressing     Problem: METABOLIC, FLUID AND ELECTROLYTES - ADULT  Goal: Electrolytes maintained within normal limits  Description: INTERVENTIONS:  - Monitor labs and assess patient for signs and symptoms of electrolyte imbalances  - Administer electrolyte replacement as ordered  - Monitor response to electrolyte replacements, including repeat lab results as appropriate  - Instruct patient on fluid and nutrition as appropriate  Outcome: Progressing  Goal: Fluid balance maintained  Description: INTERVENTIONS:  - Monitor labs   - Monitor I/O and WT  - Instruct patient on fluid and nutrition as appropriate  - Assess for signs & symptoms of volume excess or deficit  Outcome: Progressing

## 2022-07-08 NOTE — PLAN OF CARE
Problem: Potential for Falls  Goal: Patient will remain free of falls  Description: INTERVENTIONS:  - Educate patient/family on patient safety including physical limitations  - Instruct patient to call for assistance with activity   - Consult OT/PT to assist with strengthening/mobility   - Keep Call bell within reach  - Keep bed low and locked with side rails adjusted as appropriate  - Keep care items and personal belongings within reach  - Initiate and maintain comfort rounds  - Make Fall Risk Sign visible to staff  - Offer Toileting every 2 Hours, in advance of need  - Initiate/Maintain bed alarm  - Obtain necessary fall risk management equipment:   - Apply yellow socks and bracelet for high fall risk patients  - Consider moving patient to room near nurses station  Outcome: Progressing     Problem: MOBILITY - ADULT  Goal: Maintain or return to baseline ADL function  Description: INTERVENTIONS:  -  Assess patient's ability to carry out ADLs; assess patient's baseline for ADL function and identify physical deficits which impact ability to perform ADLs (bathing, care of mouth/teeth, toileting, grooming, dressing, etc )  - Assess/evaluate cause of self-care deficits   - Assess range of motion  - Assess patient's mobility; develop plan if impaired  - Assess patient's need for assistive devices and provide as appropriate  - Encourage maximum independence but intervene and supervise when necessary  - Involve family in performance of ADLs  - Assess for home care needs following discharge   - Consider OT consult to assist with ADL evaluation and planning for discharge  - Provide patient education as appropriate  Outcome: Progressing  Goal: Maintains/Returns to pre admission functional level  Description: INTERVENTIONS:  - Perform BMAT or MOVE assessment daily    - Set and communicate daily mobility goal to care team and patient/family/caregiver     - Collaborate with rehabilitation services on mobility goals if consulted  - Perform Range of Motion 3 times a day  - Reposition patient every 2 hours  - Dangle patient 3 times a day  - Stand patient 3 times a day  - Ambulate patient 3 times a day  - Out of bed to chair 3 times a day   - Out of bed for meals 3 times a day  - Out of bed for toileting  - Record patient progress and toleration of activity level   Outcome: Progressing     Problem: Prexisting or High Potential for Compromised Skin Integrity  Goal: Skin integrity is maintained or improved  Description: INTERVENTIONS:  - Identify patients at risk for skin breakdown  - Assess and monitor skin integrity  - Assess and monitor nutrition and hydration status  - Monitor labs   - Assess for incontinence   - Turn and reposition patient  - Assist with mobility/ambulation  - Relieve pressure over bony prominences  - Avoid friction and shearing  - Provide appropriate hygiene as needed including keeping skin clean and dry  - Evaluate need for skin moisturizer/barrier cream  - Collaborate with interdisciplinary team   - Patient/family teaching  - Consider wound care consult   Outcome: Progressing     Problem: Nutrition/Hydration-ADULT  Goal: Nutrient/Hydration intake appropriate for improving, restoring or maintaining nutritional needs  Description: Monitor and assess patient's nutrition/hydration status for malnutrition  Collaborate with interdisciplinary team and initiate plan and interventions as ordered  Monitor patient's weight and dietary intake as ordered or per policy  Utilize nutrition screening tool and intervene as necessary  Determine patient's food preferences and provide high-protein, high-caloric foods as appropriate       INTERVENTIONS:  - Monitor oral intake, urinary output, labs, and treatment plans  - Assess nutrition and hydration status and recommend course of action  - Evaluate amount of meals eaten  - Assist patient with eating if necessary   - Allow adequate time for meals  - Recommend/ encourage appropriate diets, oral nutritional supplements, and vitamin/mineral supplements  - Order, calculate, and assess calorie counts as needed  - Recommend, monitor, and adjust tube feedings and TPN/PPN based on assessed needs  - Assess need for intravenous fluids  - Provide specific nutrition/hydration education as appropriate  - Include patient/family/caregiver in decisions related to nutrition  Outcome: Progressing     Problem: METABOLIC, FLUID AND ELECTROLYTES - ADULT  Goal: Electrolytes maintained within normal limits  Description: INTERVENTIONS:  - Monitor labs and assess patient for signs and symptoms of electrolyte imbalances  - Administer electrolyte replacement as ordered  - Monitor response to electrolyte replacements, including repeat lab results as appropriate  - Instruct patient on fluid and nutrition as appropriate  Outcome: Progressing  Goal: Fluid balance maintained  Description: INTERVENTIONS:  - Monitor labs   - Monitor I/O and WT  - Instruct patient on fluid and nutrition as appropriate  - Assess for signs & symptoms of volume excess or deficit  Outcome: Progressing

## 2022-07-08 NOTE — PHYSICAL THERAPY NOTE
Attempted PT evaluation however pt declined stating pain is not controlled and that he believes he has gout  RN aware  Will follow    Cindy Person PT  [id number]     07/08/22 1000   Note Type   Note type Cancelled Session   Cancel Reasons Refusal

## 2022-07-08 NOTE — ASSESSMENT & PLAN NOTE
69-year-old man with a history of ESRD on HD TTS diabetes and hypertension presented to the hospital with worsening left hip/inguinal pain  · Appears musculoskeletal   Severe left hip OA noted  · Does have lymphadenopathy which will need repeat imaging as outpatient to ensure resolution  · Question of cystitis however given negative urinalysis, antibiotics were not reordered  · Appreciate orthopedic evaluation    Continue PT   · Continue oxycodone and tizanidine  · Due to possible gout, will start colchicine and prednisone taper

## 2022-07-08 NOTE — OCCUPATIONAL THERAPY NOTE
OT EVALUATION       07/08/22 1413   Note Type   Note type Cancelled Session; Evaluation   Cancel Reasons Refusal   Additional Comments Patient refused due to pain, however pt reports he will get OOB in the am   OT will re-attempt in am    Licensure   [place] License Number  Vikki Tahir thomas Misael 86 OTR/L [id number]

## 2022-07-08 NOTE — PROGRESS NOTES
[address]  Progress Note - Mary Castillo 1960, 60 y o  male MRN: [id number]  Unit/Bed#: [address] [place] [address] Encounter: [id number]  Primary Care Provider: Renita Hoover MD   Date and time admitted to hospital: 7/6/2022  6:21 PM    * Left inguinal pain  Assessment & Plan  59-year-old man with a history of ESRD on HD TTS diabetes and hypertension presented to the hospital with worsening left hip/inguinal pain  · Appears musculoskeletal   Severe left hip OA noted  · Does have lymphadenopathy which will need repeat imaging as outpatient to ensure resolution  · Question of cystitis however given negative urinalysis, antibiotics were not reordered  · Appreciate orthopedic evaluation  Continue PT   · Continue oxycodone and tizanidine  · Due to possible gout, will start colchicine and prednisone taper    Type 2 diabetes mellitus, without long-term current use of insulin [facility])  Assessment & Plan  Lab Results   Component Value Date    HGBA1C 6 6 (H) 07/07/2022     Recent Labs     07/07/22  1523 07/07/22  2255 07/08/22  0741 07/08/22  1116   POCGLU 130 117 88 127     · Prior to admission on linagliptin  Continue sliding scale during hospitalization      Left leg swelling  Assessment & Plan  · Venous duplex ordered without DVT    History of TIA (transient ischemic attack)  Assessment & Plan  · Continue clopidogrel and atorvastatin    Hypertension  Assessment & Plan  · Continue carvedilol and nifedipine    ESRD (end stage renal disease) ([address] )  Assessment & Plan  · ESRD on HD TTS  · On Tuesday on a completed half treatment due to feeling ill  · Nephrology consulted for HD management    Results from last 7 days   Lab Units 07/07/22  0437 07/06/22  1856   BUN mg/dL 41* 40*   CREATININE mg/dL 6 92* 6 87*   EGFR ml/min/1 73sq m 7 7       VTE Pharmacologic Prophylaxis: VTE Score: 3 Moderate Risk (Score 3-4) - Pharmacological DVT Prophylaxis Ordered: Heparin      Patient Centered Rounds: I have performed bedside rounds with nursing staff today  Discussions with Specialists or Other Care Team Provider:  Case management    Education and Discussions with Family / Patient:  Left voicemail for daughter Sophia    Time Spent for Care: 20 mins  More than 50% of total time spent on counseling and coordination of care as described above  Current Length of Stay: 1 day(s)  Current Patient Status: Inpatient   Certification Statement: The patient will continue to require additional inpatient hospital stay due to ambulatory dysfunction  Discharge Plan / Estimated Discharge Date: 24-48 hours    Code Status: Level 1 - Full Code      Subjective:   Patient seen and examined  Still having a lot of left groin/hip pains  Concerned about possibility of gout    Objective:   Vitals: Blood pressure 137/60, pulse 72, temperature 99 °F (37 2 °C), resp  rate 18, height 6' 1" (1 854 m), weight 106 kg (233 lb 11 oz), SpO2 97 %  Physical Exam  Vitals reviewed  Constitutional:       General: He is not in acute distress  HENT:      Head: Atraumatic  Cardiovascular:      Rate and Rhythm: Regular rhythm  Pulmonary:      Effort: Pulmonary effort is normal       Breath sounds: No wheezing  Abdominal:      General: Bowel sounds are normal       Palpations: Abdomen is soft  Tenderness: There is no abdominal tenderness  There is no rebound  Musculoskeletal:         General: Tenderness (Left hip) present  No swelling  Skin:     General: Skin is warm and dry  Neurological:      General: No focal deficit present  Mental Status: He is alert  Cranial Nerves: No cranial nerve deficit     Psychiatric:         Mood and Affect: Mood normal        Additional Data:   Labs:  Results from last 7 days   Lab Units 07/07/22 0437 07/06/22  1856   WBC Thousand/uL 9 56 12 42*   HEMOGLOBIN g/dL 9 8* 10 8*   HEMATOCRIT % 30 7* 33 4*   MCV fL 90 89   PLATELETS Thousands/uL 236 248     Results from last 7 days   Lab Units 07/07/22 0437 07/06/22  1856   SODIUM mmol/L 135* 138   POTASSIUM mmol/L 4 3 4 3   CHLORIDE mmol/L 98* 100   CO2 mmol/L 25 25   ANION GAP mmol/L 12 13   BUN mg/dL 41* 40*   CREATININE mg/dL 6 92* 6 87*   CALCIUM mg/dL 8 5 8 8   ALBUMIN g/dL  --  3 4*   TOTAL BILIRUBIN mg/dL  --  0 54   ALK PHOS U/L  --  121*   ALT U/L  --  43   AST U/L  --  24   EGFR ml/min/1 73sq m 7 7   GLUCOSE RANDOM mg/dL 113 154*       Results from last 7 days   Lab Units 07/08/22  0741 07/07/22  2255 07/07/22  1523 07/07/22  1137 07/07/22  0716 07/06/22  2242   POC GLUCOSE mg/dl 88 117 130 114 108 124     Results from last 7 days   Lab Units 07/07/22  0437   HEMOGLOBIN A1C % 6 6*         * I Have Reviewed All Lab Data Listed Above  Cultures:                   Lines/Drains:  Invasive Devices  Report    Peripheral Intravenous Line  Duration           Peripheral IV 07/06/22 Distal;Left;Upper;Ventral (anterior) Arm 1 day          Hemodialysis Catheter  Duration           HD Permanent Double Catheter 161 days              Telemetry:      Imaging:  Imaging Reports Reviewed Today Include:   CT abdomen pelvis wo contrast    Result Date: 7/6/2022  Impression: No bowel obstruction  Colonic diverticulosis without acute diverticulitis  Healing T10 vertebral body fracture  Minimal bladder wall thickening  Underlying cystitis not excluded  Additional findings as above   Workstation performed: [id number]       Scheduled Meds:  Current Facility-Administered Medications   Medication Dose Route Frequency Provider Last Rate    acetaminophen  650 mg Oral Q6H PRN Dumont Haley, PA-C      allopurinol  100 mg Oral Daily Brina Vecellio, PA-C      atorvastatin  80 mg Oral HS Brina Vecvahido, PA-C      carvedilol  12 5 mg Oral BID Dumont Haley, PA-C      clopidogrel  75 mg Oral Daily Brina Vecellio, PA-C      heparin (porcine)  5,000 Units Subcutaneous Q8H [address] Brina Vecellio, PA-C      HYDROmorphone  0 2 mg Intravenous Q4H PRN Juliette Morales DO      insulin lispro  1-6 Units Subcutaneous TID AC Brina VecDENISSE valenzuela      insulin lispro  1-6 Units Subcutaneous HS Brina Milton PA-C      NIFEdipine  30 mg Oral BID Elias Rodney PA-C      ondansetron  4 mg Intravenous Q6H PRN Elias Rodney PA-C      oxyCODONE  10 mg Oral Q4H PRN Wilder Ruiz DO      oxyCODONE  5 mg Oral Q4H PRN Wilder Ruiz DO      pneumococcal 23-valent polysaccharide vaccine  0 5 mL Subcutaneous Prior to discharge Lore Silver DO      sevelamer  800 mg Oral TID With Meals Elias Rodney PA-C      tiZANidine  4 mg Oral TID Wilder Ruiz DO         Today, Patient Was Seen By: Wiledr Ruiz DO    ** Please Note: Dictation voice to text software may have been used in the creation of this document   **

## 2022-07-08 NOTE — PHYSICAL THERAPY NOTE
PHYSICAL THERAPY     07/08/22 8147   Note Type   Note type Evaluation; Cancelled Session   Cancel Reasons Refusal   Additional Comments will reattempt at a later time   Licensure   [place] License Number  Robyn Hernandez  [id number]

## 2022-07-08 NOTE — ASSESSMENT & PLAN NOTE
Lab Results   Component Value Date    HGBA1C 6 6 (H) 07/07/2022     Recent Labs     07/07/22  1523 07/07/22  2255 07/08/22  0741 07/08/22  1116   POCGLU 130 117 88 127     · Prior to admission on linagliptin    Continue sliding scale during hospitalization

## 2022-07-09 ENCOUNTER — APPOINTMENT (INPATIENT)
Dept: DIALYSIS | Facility: HOSPITAL | Age: 62
DRG: 553 | End: 2022-07-09
Attending: INTERNAL MEDICINE
Payer: MEDICARE

## 2022-07-09 LAB
GLUCOSE SERPL-MCNC: 172 MG/DL (ref 65–140)
GLUCOSE SERPL-MCNC: 179 MG/DL (ref 65–140)
GLUCOSE SERPL-MCNC: 212 MG/DL (ref 65–140)
GLUCOSE SERPL-MCNC: 229 MG/DL (ref 65–140)

## 2022-07-09 PROCEDURE — 99232 SBSQ HOSP IP/OBS MODERATE 35: CPT | Performed by: STUDENT IN AN ORGANIZED HEALTH CARE EDUCATION/TRAINING PROGRAM

## 2022-07-09 PROCEDURE — 90935 HEMODIALYSIS ONE EVALUATION: CPT | Performed by: INTERNAL MEDICINE

## 2022-07-09 PROCEDURE — 82948 REAGENT STRIP/BLOOD GLUCOSE: CPT

## 2022-07-09 PROCEDURE — 97167 OT EVAL HIGH COMPLEX 60 MIN: CPT

## 2022-07-09 PROCEDURE — 97163 PT EVAL HIGH COMPLEX 45 MIN: CPT | Performed by: PHYSICAL THERAPIST

## 2022-07-09 RX ADMIN — OXYCODONE HYDROCHLORIDE 10 MG: 10 TABLET ORAL at 03:58

## 2022-07-09 RX ADMIN — SEVELAMER HYDROCHLORIDE 800 MG: 800 TABLET, FILM COATED PARENTERAL at 08:26

## 2022-07-09 RX ADMIN — INSULIN LISPRO 2 UNITS: 100 INJECTION, SOLUTION INTRAVENOUS; SUBCUTANEOUS at 11:41

## 2022-07-09 RX ADMIN — CLOPIDOGREL BISULFATE 75 MG: 75 TABLET ORAL at 08:26

## 2022-07-09 RX ADMIN — PREDNISONE 60 MG: 20 TABLET ORAL at 08:25

## 2022-07-09 RX ADMIN — TIZANIDINE 4 MG: 2 TABLET ORAL at 08:25

## 2022-07-09 RX ADMIN — INSULIN LISPRO 1 UNITS: 100 INJECTION, SOLUTION INTRAVENOUS; SUBCUTANEOUS at 16:31

## 2022-07-09 RX ADMIN — COLCHICINE 0.3 MG: 0.6 TABLET ORAL at 08:25

## 2022-07-09 RX ADMIN — ATORVASTATIN CALCIUM 80 MG: 80 TABLET, FILM COATED ORAL at 22:12

## 2022-07-09 RX ADMIN — ALLOPURINOL 100 MG: 100 TABLET ORAL at 08:26

## 2022-07-09 RX ADMIN — HEPARIN SODIUM 5000 UNITS: 5000 INJECTION INTRAVENOUS; SUBCUTANEOUS at 22:11

## 2022-07-09 RX ADMIN — NIFEDIPINE 30 MG: 30 TABLET, EXTENDED RELEASE ORAL at 17:34

## 2022-07-09 RX ADMIN — HEPARIN SODIUM 5000 UNITS: 5000 INJECTION INTRAVENOUS; SUBCUTANEOUS at 06:52

## 2022-07-09 RX ADMIN — SEVELAMER HYDROCHLORIDE 800 MG: 800 TABLET, FILM COATED PARENTERAL at 16:31

## 2022-07-09 RX ADMIN — INSULIN LISPRO 2 UNITS: 100 INJECTION, SOLUTION INTRAVENOUS; SUBCUTANEOUS at 22:11

## 2022-07-09 RX ADMIN — INSULIN LISPRO 1 UNITS: 100 INJECTION, SOLUTION INTRAVENOUS; SUBCUTANEOUS at 08:27

## 2022-07-09 RX ADMIN — HEPARIN SODIUM 5000 UNITS: 5000 INJECTION INTRAVENOUS; SUBCUTANEOUS at 14:29

## 2022-07-09 RX ADMIN — OXYCODONE HYDROCHLORIDE 10 MG: 10 TABLET ORAL at 16:31

## 2022-07-09 RX ADMIN — CARVEDILOL 12.5 MG: 12.5 TABLET, FILM COATED ORAL at 17:35

## 2022-07-09 NOTE — PROCEDURES
NEPHROLOGY DIALYSIS PROCEDURE NOTE      Patient seen and examined on Hemodialysis, tolerating procedure well  All documentation, labs, medications were reviewed by myself, and the treatment plan was reviewed with nurse and patient  Seen on Dialysis at : 11:51 AM  Dialysis Access: Right IJ tunneled PermCath  Vitals:  110/51  Dialysis time: 3 hours  Dialyzer: F160  Sodium bath: 138  Potassium bath: 3 K+  Bicarbonate bath: 35  Calcium bath: 2 5  Ultrafiltration: Aim for EDW  Blood flow rate: 350 cc/min  Dialysis flow rate: 1 5 X Qb  Dialysis temperature: 36C  Medications given on HD: none    ASSESSMENT and PLAN:     23-year-old male with a history of advanced chronic kidney disease currently dialysis dependent who presents for groin/inguinal pain  Nephrology consult for dialysis management      End-stage renal disease  --requiring chronic incenter hemodialysis Tuesday Thursday Saturday at [facility]  --has been dialysis dependent since February with no evidence of renal recovery  --still has some residual renal function and makes urine  --seen on dialysis today  --access:  Right IJ PermCath  --stable from renal standpoint for discharge     Anemia of chronic kidney disease  --stable     Left groin pain  --improving     Blood pressure/volume status  --examines euvolemic  --history of hypertension  --presented with accelerated hypertension which has now improved currently normotensive     Coronary artery disease  --continue carvedilol, atorvastatin, Plavix     Mineral bone disorder-chronic kidney disease  --low phosphorus diet, continue phosphate binder    Review of Systems: The entire 12 point ROS has been reviewed      Physical Exam:    General:  Awake, no acute distress  Skin:  No rashes no lesions  CVS:  S1-S2 appreciated regular rhythm  Lungs:  Clear to auscultation  Abdomen:  Nontender nondistended  Access:  Right IJ PermCath with no exudate  Extremities:  No edema  Neuro:  No asterixis          Current Facility-Administered Medications:     acetaminophen (TYLENOL) tablet 650 mg, 650 mg, Oral, Q6H PRN, Adelina Wilder PA-C    allopurinol (ZYLOPRIM) tablet 100 mg, 100 mg, Oral, Daily, Brina Vecellio, PA-C, 100 mg at 07/09/22 0826    atorvastatin (LIPITOR) tablet 80 mg, 80 mg, Oral, HS, Brina Vecvahido, PA-C, 80 mg at 07/08/22 2127    carvedilol (COREG) tablet 12 5 mg, 12 5 mg, Oral, BID, Brina Vecellio, PA-C, 12 5 mg at 07/08/22 1637    clopidogrel (PLAVIX) tablet 75 mg, 75 mg, Oral, Daily, Brina Vecellio, PA-C, 75 mg at 07/09/22 0826    heparin (porcine) subcutaneous injection 5,000 Units, 5,000 Units, Subcutaneous, Q8H [address], Brina Vecellio, PA-C, 5,000 Units at 07/09/22 4878    HYDROmorphone HCl (DILAUDID) injection 0 2 mg, 0 2 mg, Intravenous, Q4H PRN, Freddy Carrasquillo DO, 0 2 mg at 07/08/22 1637    insulin lispro (HumaLOG) 100 units/mL subcutaneous injection 1-6 Units, 1-6 Units, Subcutaneous, TID AC, 2 Units at 07/09/22 1141 **AND** Fingerstick Glucose (POCT), , , TID AC, Brina Vecellio, PA-C    insulin lispro (HumaLOG) 100 units/mL subcutaneous injection 1-6 Units, 1-6 Units, Subcutaneous, HS, Brina Vecellio, PA-C, 2 Units at 07/08/22 2128    NIFEdipine (PROCARDIA XL) 24 hr tablet 30 mg, 30 mg, Oral, BID, Brina Vecvahido, PA-C, 30 mg at 07/08/22 1638    ondansetron (ZOFRAN) injection 4 mg, 4 mg, Intravenous, Q6H PRN, Adelina Wilder PA-C    oxyCODONE (ROXICODONE) immediate release tablet 10 mg, 10 mg, Oral, Q4H PRN, Freddy Carrasquillo DO, 10 mg at 07/09/22 0358    oxyCODONE (ROXICODONE) IR tablet 5 mg, 5 mg, Oral, Q4H PRN, Chela Jansen DO    pneumococcal 23-valent polysaccharide vaccine (PNEUMOVAX-23) injection 0 5 mL, 0 5 mL, Subcutaneous, Prior to discharge, Lore Silver DO    [COMPLETED] predniSONE tablet 60 mg, 60 mg, Oral, Daily, 60 mg at 07/09/22 0825 **FOLLOWED BY** [START ON 7/10/2022] predniSONE tablet 40 mg, 40 mg, Oral, Daily **FOLLOWED BY** [START ON 7/12/2022] predniSONE tablet 20 mg, 20 mg, Oral, Daily **FOLLOWED BY** [START ON 7/14/2022] predniSONE tablet 10 mg, 10 mg, Oral, Daily, Freddy Carrasquillo DO    sevelamer (RENAGEL) tablet 800 mg, 800 mg, Oral, TID With Meals, Brina Milton PA-C, 800 mg at 07/09/22 0826    tiZANidine (ZANAFLEX) tablet 4 mg, 4 mg, Oral, TID, Freddy Carrasquillo DO, 4 mg at 07/09/22 8579

## 2022-07-09 NOTE — PLAN OF CARE
Post-Dialysis RN Treatment Note    Blood Pressure:  Pre:  102/50 mm/Hg  Post: 115/65 mmHg   EDW: 104 0 kg    Weight:  Pre: 106 2 kg   Post: 104 0 kg   Mode of weight measurement: Bed Scale   Volume Removed:  2200 ml    Treatment duration: 180 minutes    NS given  No    Treatment shortened?  No   Medications given during Rx None Reported   Estimated Kt/V  Not Applicable   Access type: Permacath/TDC   Access Issues: No    Report called to primary nurse   Yes, Haritha    Problem: METABOLIC, FLUID AND ELECTROLYTES - ADULT  Goal: Electrolytes maintained within normal limits  Description: INTERVENTIONS:  - Monitor labs and assess patient for signs and symptoms of electrolyte imbalances  - Administer electrolyte replacement as ordered  - Monitor response to electrolyte replacements, including repeat lab results as appropriate  - Instruct patient on fluid and nutrition as appropriate  Outcome: Progressing  Goal: Fluid balance maintained  Description: INTERVENTIONS:  - Monitor labs   - Monitor I/O and WT  - Instruct patient on fluid and nutrition as appropriate  - Assess for signs & symptoms of volume excess or deficit  Outcome: Progressing

## 2022-07-09 NOTE — ASSESSMENT & PLAN NOTE
Lab Results   Component Value Date    HGBA1C 6 6 (H) 07/07/2022     Recent Labs     07/08/22  1557 07/08/22 2057 07/09/22  0708 07/09/22  1054   POCGLU 183* 233* 172* 212*     · Prior to admission on linagliptin    Continue sliding scale during hospitalization

## 2022-07-09 NOTE — PHYSICAL THERAPY NOTE
PT EVALUATION     Time In: 3799  Time Out: 0900 07/09/22 0850   PT Last Visit   PT Visit Date 07/09/22   Note Type   Note type Evaluation   Pain Assessment   Pain Assessment Tool 0-10   Hospital Pain Intervention(s) Repositioned; Ambulation/increased activity; Emotional support   Restrictions/Precautions   Other Precautions Fall Risk;Pain; Chair Alarm; Bed Alarm   Home Living   Type of Christen Nathan2 to live on main level with bedroom/bathroom   Bathroom Accessibility Accessible   Prior Function   Level of [place] Independent with ADLs and functional mobility   Lives With Alone   Receives Help From Family   ADL Assistance Independent   IADLs Independent   General   Additional Pertinent History Pt admitted for L inguinal pain and flare-up of gout   Family/Caregiver Present No   Cognition   Overall Cognitive Status WFL   Arousal/Participation Cooperative   Orientation Level Oriented X4   Memory Within functional limits   Following Commands Follows all commands and directions without difficulty   Subjective   Subjective "doing better, not as much pain as the other day "   RLE Assessment   RLE Assessment WFL  (4/5 all myotomes)   LLE Assessment   LLE Assessment   (3/5 to 3+/5 L LE myotomes, limited due to pain)   Bed Mobility   Additional Comments Pt seated EOB upon PT arrival   Transfers   Sit to Stand 4  Minimal assistance   Additional items Assist x 1   Stand to Sit 4  Minimal assistance   Additional items Assist x 1   Ambulation/Elevation   Gait pattern Decreased L stance; Inconsistent amira; Foward flexed; Step to  (L antalgic gait)   Gait Assistance 4  Minimal assist   Additional items Assist x 1   Assistive Device Rolling walker   Distance 3 feet L sidestepping to head of bed   Stair Management Assistance Not tested   Balance   Static Sitting Good   Dynamic Sitting Fair +   Static Standing Fair   Dynamic Standing Fair -   Ambulatory Fair -   Activity Tolerance   Activity Tolerance Patient tolerated treatment well;Patient limited by pain   Nurse Made Aware CURLY Stephens   Assessment   Problem List Decreased strength;Decreased range of motion;Decreased endurance; Impaired balance;Decreased mobility;Pain   Assessment Patient seen for Physical Therapy evaluation  Patient admitted with Left inguinal pain  Comorbidities affecting patient's physical performance include: left inguinal pain, L LE swelling, anemia, diabetes, ESRD on hemodialysis, hypertension and TIA  Personal factors affecting patient at time of initial evaluation include: lives in multi- story house, inability to ambulate household distances, inability to navigate community distances, limited home support, inability to perform caregiver support/tasks, inability to perform physical activity, inability to perform ADLS and inability to perform IADLS   Prior to admission, patient was independent with ADLS, independent with IADLS, living in a multi-level home, ambulating household distance and ambulating community distances  Please find objective findings from Physical Therapy assessment regarding body systems outlined above with impairments and limitations including weakness, decreased ROM, impaired balance, decreased endurance, gait deviations, pain, decreased activity tolerance, decreased functional mobility tolerance, altered sensation and fall risk  The Barthel Index was used as a functional outcome tool presenting with a score of Barthel Index Score: 70 today indicating moderate limitations of functional mobility and ADLS  Patient's clinical presentation is currently evolving as seen in patient's presentation of increased fall risk, new onset of impairment of functional mobility, decreased endurance and new onset of weakness  Pt would benefit from continued Physical Therapy treatment to address deficits as defined above and maximize level of functional mobility   As demonstrated by objective findings, the assigned level of complexity for this evaluation is high  The patient's [facility] Basic Mobility Inpatient Short Form Raw Score is 16  A Raw score of less than or equal to 16 suggests the patient may benefit from discharge to post-acute rehabilitation services  Please also refer to the recommendation of the Physical Therapist for safe discharge planning  Barriers to Discharge Decreased caregiver support   Goals   Patient Goals "less pain"   STG Expiration Date 07/16/22   Short Term Goal #1 Pt will improve L LE myotomes by 1/2 grade   Short Term Goal #2 Pt will perform all transfers supervision   LTG Expiration Date 07/23/22   Long Term Goal #1 Pt will be able to ambulate min A x 1 with RW 50 feet   Long Term Goal #2 Pt will be independent with HEP at time of discharge  Plan   Treatment/Interventions ADL retraining;Functional transfer training;LE strengthening/ROM; Therapeutic exercise; Endurance training;Patient/family training;Equipment eval/education; Bed mobility;Gait training;Spoke to nursing   PT Frequency   (5x/wk)   Recommendation   PT Discharge Recommendation Home with home health rehabilitation  (HHPT vs  STR pending pt progress while in hospital)   [facility] Basic Mobility Inpatient   Turning in Bed Without Bedrails 3   Lying on Back to Sitting on Edge of Flat Bed 3   Moving Bed to Chair 3   Standing Up From Chair 3   Walk in Room 2   Climb 3-5 Stairs 2   Basic Mobility Inpatient Raw Score 16   Basic Mobility Standardized Score 38 32   Highest Level Of Mobility   -HLM Goal 5: Stand one or more mins   -HLM Achieved 5: Stand (1 or more minutes)   Barthel Index   Feeding 10   Bathing 5   Grooming Score 5   Dressing Score 10   Bladder Score 10   Bowels Score 10   Toilet Use Score 10   Transfers (Bed/Chair) Score 10   Mobility (Level Surface) Score 0   Stairs Score 0   Barthel Index Score 70   End of Consult   Patient Position at End of Consult Seated edge of bed; All needs within reach   Licensure   [place] License Number  Blakealexandra Karen PT, DPT [id number]

## 2022-07-09 NOTE — ASSESSMENT & PLAN NOTE
· ESRD on HD TTS  · Nephrology consulted for HD management    Results from last 7 days   Lab Units 07/07/22  0437 07/06/22  1856   BUN mg/dL 41* 40*   CREATININE mg/dL 6 92* 6 87*   EGFR ml/min/1 73sq m 7 7

## 2022-07-09 NOTE — OCCUPATIONAL THERAPY NOTE
OT EVALUATION     07/09/22 0849   Note Type   Note type Evaluation   Restrictions/Precautions   Other Precautions Fall Risk;Pain; Chair Alarm; Bed Alarm   Pain Assessment   Pain Assessment Tool 0-10   Pain Score 3   Pain Location/Orientation Location: Groin;Orientation: Left   Home Living   Type of Home House   Home Layout Two level; Able to live on main level with bedroom/bathroom  ([address] in the back)   Bathroom Shower/Tub Tub/shower unit  (Patient reports he has been sponge bathing at baseline )   Bathroom Toilet Raised   Prior Function   Level of [place] Independent with ADLs and functional mobility   Lives With Alone   Receives Help From Family   ADL Assistance Independent   IADLs Independent   Vocational On disability   Comments Patient admitted with left inguinal pain   Lifestyle   Intrinsic Gratification Pt was a    Subjective   Subjective "The medicine is working for my gout, I have less pain and think I will be okay to go home Monday"   ADL   Eating Assistance 5  Supervision/Setup   Grooming Assistance 5  Supervision/Setup   UB Bathing Assistance 5  Supervision/Setup    [address] 5  Supervision/Setup   LB Dressing Assistance 4  [address]  4  Minimal Assistance   Bed Mobility   Additional Comments Pt seated at the edge of bed upon entrance of the room   Transfers   Sit to Stand 4  Minimal assistance   Additional items Assist x 1   Stand to Sit 4  Minimal assistance   Additional items Assist x 1   Additional Comments Patient able to tolerate approx 3-4 minutes of static standing   Pt noted to have more pain completing stand > sit in the groin area   Functional Mobility   Functional Mobility 4  Minimal assistance   Additional Comments Patient ambulated a few steps to the head of the bed with min assist and use of RW   Balance   Static Sitting Fair +   Dynamic Sitting Fair   Static Standing Fair   Dynamic Standing Fair -   Activity Tolerance   Activity Tolerance Patient tolerated treatment well;Patient limited by pain   Nurse Made Aware Yes, Kat RN   RUE Assessment   RUE Assessment WFL  (Pt reports a hx of gout in R shoulder)   LUE Assessment   LUE Assessment WFL   Cognition   Overall Cognitive Status WFL   Arousal/Participation Alert; Responsive; Cooperative   Attention Within functional limits   Orientation Level Oriented X4   Following Commands Follows all commands and directions without difficulty   Assessment   Limitation Decreased ADL status; Decreased UE strength;Decreased UE ROM; Decreased endurance;Decreased self-care trans;Decreased high-level ADLs   Prognosis Good   Assessment Patient evaluated by Occupational Therapy  Patient admitted with Left inguinal pain  The patients occupational profile, medical and therapy history includes a extensive additional review of physical, cognitive, or psychosocial history related to current functional performance  Comorbidities affecting functional mobility and ADLS include: CKD, diabetes, hypertension and TIA  Prior to admission, patient was independent with ADLS and independent with IADLS  The evaluation identifies the following performance deficits: weakness, decreased ROM, decreased endurance, increased fall risk, new onset of impairment of functional mobility, decreased ADLS, decreased IADLS, pain, decreased activity tolerance and decreased strength, that result in activity limitations and/or participation restrictions  This evaluation requires clinical decision making of high complexity, because the patient presents with comorbidites that affect occupational performance and required significant modification of tasks or assistance with consideration of multiple treatment options  The Barthel Index was used as a functional outcome tool presenting with a score of Barthel Index Score: 70, indicating moderate limitations of functional mobility and ADLS    The patient's raw score on the AM-PAC Daily Activity inpatient short form is 20, standardized score is 42 03, greater than 39 4  Patients at this level are likely to benefit from DC to home  Please refer to the recommendation of the Occupational Therapist for safe DC planning  Patient will benefit from skilled Occupational Therapy services to address above deficits and facilitate a safe return to prior level of function  Goals   Patient Goals to go home   STG Time Frame   (1-7 days)   Short Term Goal  Goals established to promote Patient Goals: "less pain":  Patient will increase standing tolerance to 10 minutes during ADL task to decrease assistance level and decrease fall risk; Patient will increase bed mobility to independent in preparation for ADLS and transfers; Patient will increase functional mobility to and from bathroom with rolling walker with supervision to increase performance with ADLS and to use a toilet; Patient will tolerate 10 minutes of UE ROM/strengthening to increase general activity tolerance and performance in ADLS/IADLS; Patient will improve functional activity tolerance to 10 minutes of sustained functional tasks to increase participation in basic self-care and decrease assistance level;  Patient will be able to to verbalize understanding and perform energy conservation/proper body mechanics during ADLS and functional mobility at least 75% of the time with minimal cueing to decrease signs of fatigue and increase stamina to return to prior level of function; Patient will increase dynamic sitting balance to fair+ to improve the ability to sit at edge of bed or on a chair for ADLS;  Patient will increase dynamic standing balance to fair to improve postural stability and decrease fall risk during standing ADLS and transfers     LTG Time Frame   (8-14 days)   Long Term Goal Patient will increase standing tolerance to 20 minutes during ADL task to decrease assistance level and decrease fall risk;Patient will increase functional mobility to and from bathroom with rolling walker independently to increase performance with ADLS and to use a toilet; Patient will tolerate 20 minutes of UE ROM/strengthening to increase general activity tolerance and performance in ADLS/IADLS; Patient will improve functional activity tolerance to 20 minutes of sustained functional tasks to increase participation in basic self-care and decrease assistance level;  Patient will be able to to verbalize understanding and perform energy conservation/proper body mechanics during ADLS and functional mobility at least 90% of the time with minimal cueing to decrease signs of fatigue and increase stamina to return to prior level of function; Patient will increase static/dynamic sitting balance to good to improve the ability to sit at edge of bed or on a chair for ADLS;  Patient will increase static/dynamic standing balance to fair+ to improve postural stability and decrease fall risk during standing ADLS and transfers  Pt will score >/= 24/24 on AM-PAC Daily Activity Inpatient scale to promote safe independence with ADLs and functional mobility; Pt will score >/= 100/100 on Barthel Index in order to decrease caregiver assistance needed and increase ability to perform ADLs and functional mobility  Functional Transfer Goals   Pt Will Perform All Functional Transfers   (STG supervision LTG independent)   ADL Goals   Pt Will Perform Eating   (STG independent)   Pt Will Perform Grooming   (STG independent)   Pt Will Perform Bathing   (STG min assist LTG supervision)   Pt Will Perform UE Dressing   (STG supervision LTG independent)   Pt Will Perform LE Dressing   (STG min assist LTG supervision)   Pt Will Perform Toileting   (STG min assist LTG supervision)   Plan   Treatment Interventions ADL retraining;Functional transfer training;UE strengthening/ROM; Activityengagement; Energy conservation;Patient/family training;Equipment evaluation/education   Goal Expiration Date 07/23/22   OT Frequency 3-5x/wk   Recommendation   OT Discharge Recommendation Home with home health rehabilitation   AM-PAC Daily Activity Inpatient   Lower Body Dressing 3   Bathing 3   Toileting 3   Upper Body Dressing 3   Grooming 4   Eating 4   Daily Activity Raw Score 20   Daily Activity Standardized Score (Calc for Raw Score >=11) 42 03   AM-PAC Applied Cognition Inpatient   Following a Speech/Presentation 4   Understanding Ordinary Conversation 4   Taking Medications 4   Remembering Where Things Are Placed or Put Away 4   Remembering List of 4-5 Errands 4   Taking Care of Complicated Tasks 4   Applied Cognition Raw Score 24   Applied Cognition Standardized Score 62 21   Barthel Index   Feeding 5   Bathing 0   Grooming Score 0   Dressing Score 5   Bladder Score 10   Bowels Score 10   Toilet Use Score 5   Transfers (Bed/Chair) Score 10   Mobility (Level Surface) Score 0   Stairs Score 0   Barthel Index Score 45   Licensure   [place] License Number  [employer], OTS

## 2022-07-09 NOTE — PLAN OF CARE
Problem: PHYSICAL THERAPY ADULT  Goal: Performs mobility at highest level of function for planned discharge setting  See evaluation for individualized goals  Description: Treatment/Interventions: ADL retraining, Functional transfer training, LE strengthening/ROM, Therapeutic exercise, Endurance training, Patient/family training, Equipment eval/education, Bed mobility, Gait training, Spoke to nursing          See flowsheet documentation for full assessment, interventions and recommendations  Note:    Problem List: Decreased strength, Decreased range of motion, Decreased endurance, Impaired balance, Decreased mobility, Pain  Assessment: Patient seen for Physical Therapy evaluation  Patient admitted with Left inguinal pain  Comorbidities affecting patient's physical performance include: left inguinal pain, L LE swelling, anemia, diabetes, ESRD on hemodialysis, hypertension and TIA  Personal factors affecting patient at time of initial evaluation include: lives in multi- story house, inability to ambulate household distances, inability to navigate community distances, limited home support, inability to perform caregiver support/tasks, inability to perform physical activity, inability to perform ADLS and inability to perform IADLS   Prior to admission, patient was independent with ADLS, independent with IADLS, living in a multi-level home, ambulating household distance and ambulating community distances  Please find objective findings from Physical Therapy assessment regarding body systems outlined above with impairments and limitations including weakness, decreased ROM, impaired balance, decreased endurance, gait deviations, pain, decreased activity tolerance, decreased functional mobility tolerance, altered sensation and fall risk  The Barthel Index was used as a functional outcome tool presenting with a score of Barthel Index Score: 70 today indicating moderate limitations of functional mobility and ADLS  Patient's clinical presentation is currently evolving as seen in patient's presentation of increased fall risk, new onset of impairment of functional mobility, decreased endurance and new onset of weakness  Pt would benefit from continued Physical Therapy treatment to address deficits as defined above and maximize level of functional mobility  As demonstrated by objective findings, the assigned level of complexity for this evaluation is high  The patient's AM-PAC Basic Mobility Inpatient Short Form Raw Score is 16  A Raw score of less than or equal to 16 suggests the patient may benefit from discharge to post-acute rehabilitation services  Please also refer to the recommendation of the Physical Therapist for safe discharge planning  Barriers to Discharge: Decreased caregiver support        PT Discharge Recommendation: Home with home health rehabilitation (HHPT vs  STR pending pt progress while in hospital)          See flowsheet documentation for full assessment

## 2022-07-09 NOTE — ASSESSMENT & PLAN NOTE
59-year-old man with a history of ESRD on HD TTS diabetes and hypertension presented to the hospital with worsening left hip/inguinal pain  · Appears musculoskeletal   Severe left hip OA noted  · Does have lymphadenopathy which will need repeat imaging as outpatient to ensure resolution  · Question of cystitis however given negative urinalysis, antibiotics were not reordered  · Appreciate orthopedic evaluation    Continue PT   · Continue pain control as needed  · Potentially gout, Prednisone taper  · Seems to have improvement today, plan for dialysis today, monitor overnight, discharge morning

## 2022-07-09 NOTE — PLAN OF CARE
Problem: Potential for Falls  Goal: Patient will remain free of falls  Description: INTERVENTIONS:  - Educate patient/family on patient safety including physical limitations  - Instruct patient to call for assistance with activity   - Consult OT/PT to assist with strengthening/mobility   - Keep Call bell within reach  - Keep bed low and locked with side rails adjusted as appropriate  - Keep care items and personal belongings within reach  - Initiate and maintain comfort rounds  - Make Fall Risk Sign visible to staff  - Offer Toileting every 2 Hours, in advance of need  - Initiate/Maintain bed alarm  - Obtain necessary fall risk management equipment: bed alarm, yellow socks  - Apply yellow socks and bracelet for high fall risk patients  - Consider moving patient to room near nurses station  Outcome: Progressing     Problem: MOBILITY - ADULT  Goal: Maintain or return to baseline ADL function  Description: INTERVENTIONS:  -  Assess patient's ability to carry out ADLs; assess patient's baseline for ADL function and identify physical deficits which impact ability to perform ADLs (bathing, care of mouth/teeth, toileting, grooming, dressing, etc )  - Assess/evaluate cause of self-care deficits   - Assess range of motion  - Assess patient's mobility; develop plan if impaired  - Assess patient's need for assistive devices and provide as appropriate  - Encourage maximum independence but intervene and supervise when necessary  - Involve family in performance of ADLs  - Assess for home care needs following discharge   - Consider OT consult to assist with ADL evaluation and planning for discharge  - Provide patient education as appropriate  Outcome: Progressing  Goal: Maintains/Returns to pre admission functional level  Description: INTERVENTIONS:  - Perform BMAT or MOVE assessment daily    - Set and communicate daily mobility goal to care team and patient/family/caregiver     - Collaborate with rehabilitation services on mobility goals if consulted  - Perform Range of Motion 3 times a day  - Reposition patient every 2 hours  - Dangle patient 3 times a day  - Stand patient 3 times a day  - Ambulate patient 3 times a day  - Out of bed to chair 3 times a day   - Out of bed for meals 3 times a day  - Out of bed for toileting  - Record patient progress and toleration of activity level   Outcome: Progressing     Problem: Prexisting or High Potential for Compromised Skin Integrity  Goal: Skin integrity is maintained or improved  Description: INTERVENTIONS:  - Identify patients at risk for skin breakdown  - Assess and monitor skin integrity  - Assess and monitor nutrition and hydration status  - Monitor labs   - Assess for incontinence   - Turn and reposition patient  - Assist with mobility/ambulation  - Relieve pressure over bony prominences  - Avoid friction and shearing  - Provide appropriate hygiene as needed including keeping skin clean and dry  - Evaluate need for skin moisturizer/barrier cream  - Collaborate with interdisciplinary team   - Patient/family teaching  - Consider wound care consult   Outcome: Progressing     Problem: Nutrition/Hydration-ADULT  Goal: Nutrient/Hydration intake appropriate for improving, restoring or maintaining nutritional needs  Description: Monitor and assess patient's nutrition/hydration status for malnutrition  Collaborate with interdisciplinary team and initiate plan and interventions as ordered  Monitor patient's weight and dietary intake as ordered or per policy  Utilize nutrition screening tool and intervene as necessary  Determine patient's food preferences and provide high-protein, high-caloric foods as appropriate       INTERVENTIONS:  - Monitor oral intake, urinary output, labs, and treatment plans  - Assess nutrition and hydration status and recommend course of action  - Evaluate amount of meals eaten  - Assist patient with eating if necessary   - Allow adequate time for meals  - Recommend/ encourage appropriate diets, oral nutritional supplements, and vitamin/mineral supplements  - Order, calculate, and assess calorie counts as needed  - Recommend, monitor, and adjust tube feedings and TPN/PPN based on assessed needs  - Assess need for intravenous fluids  - Provide specific nutrition/hydration education as appropriate  - Include patient/family/caregiver in decisions related to nutrition  Outcome: Progressing     Problem: METABOLIC, FLUID AND ELECTROLYTES - ADULT  Goal: Electrolytes maintained within normal limits  Description: INTERVENTIONS:  - Monitor labs and assess patient for signs and symptoms of electrolyte imbalances  - Administer electrolyte replacement as ordered  - Monitor response to electrolyte replacements, including repeat lab results as appropriate  - Instruct patient on fluid and nutrition as appropriate  Outcome: Progressing  Goal: Fluid balance maintained  Description: INTERVENTIONS:  - Monitor labs   - Monitor I/O and WT  - Instruct patient on fluid and nutrition as appropriate  - Assess for signs & symptoms of volume excess or deficit  Outcome: Progressing

## 2022-07-09 NOTE — PROGRESS NOTES
[facility] [address]  Progress Note - Fabby Alert 1960, 60 y o  male MRN: [id number]  Unit/Bed#: [address] [place] [address] Encounter: [id number]  Primary Care Provider: Jessica Easley MD   Date and time admitted to hospital: 7/6/2022  6:21 PM    Left leg swelling  Assessment & Plan  · Venous duplex ordered without DVT  · Monitor outpatient for resolution    History of TIA (transient ischemic attack)  Assessment & Plan  · Continue clopidogrel and atorvastatin    Hypertension  Assessment & Plan  · Continue carvedilol and nifedipine  · Blood pressure remains stable    Type 2 diabetes mellitus, without long-term current use of insulin [facility])  Assessment & Plan  Lab Results   Component Value Date    HGBA1C 6 6 (H) 07/07/2022     Recent Labs     07/08/22  1557 07/08/22  2057 07/09/22  0708 07/09/22  1054   POCGLU 183* 233* 172* 212*     · Prior to admission on linagliptin  Continue sliding scale during hospitalization      ESRD (end stage renal disease) ([facility] [address] )  Assessment & Plan  · ESRD on HD TTS  · Nephrology consulted for HD management    Results from last 7 days   Lab Units 07/07/22  0437 07/06/22  1856   BUN mg/dL 41* 40*   CREATININE mg/dL 6 92* 6 87*   EGFR ml/min/1 73sq m 7 7       * Left inguinal pain  Assessment & Plan  70-year-old man with a history of ESRD on HD TTS diabetes and hypertension presented to the hospital with worsening left hip/inguinal pain  · Appears musculoskeletal   Severe left hip OA noted  · Does have lymphadenopathy which will need repeat imaging as outpatient to ensure resolution  · Question of cystitis however given negative urinalysis, antibiotics were not reordered  · Appreciate orthopedic evaluation    Continue PT   · Continue pain control as needed  · Potentially gout, Prednisone taper  · Seems to have improvement today, plan for dialysis today, monitor overnight, discharge morning        VTE Pharmacologic Prophylaxis: VTE Score: 3 Moderate Risk (Score 3-4) - Pharmacological DVT Prophylaxis Ordered: heparin  Patient Centered Rounds: I performed bedside rounds with nursing staff today  Discussions with Specialists or Other Care Team Provider:  Appreciate nephrology recommendations    Education and Discussions with Family / Patient: Patient declined call to   Time Spent for Care: 20 minutes  More than 50% of total time spent on counseling and coordination of care as described above  Current Length of Stay: 2 day(s)  Current Patient Status: Inpatient   Certification Statement: The patient will continue to require additional inpatient hospital stay due to Hemodialysis today, monitor left hip  Discharge Plan: Anticipate discharge tomorrow to home  Code Status: Level 1 - Full Code    Subjective:   Patient seen examined at bedside this morning  Overall left hip pain improving  Vitals stable, labs appropriate  He will be going for hemodialysis today, we are monitoring closely resolution of symptoms  Patient denies fever/chills, nausea/vomiting/diarrhea, shortness of breath, chest pain  Objective:     Vitals:   Temp (24hrs), Av 1 °F (36 7 °C), Min:98 °F (36 7 °C), Max:98 3 °F (36 8 °C)    Temp:  [98 °F (36 7 °C)-98 3 °F (36 8 °C)] 98 °F (36 7 °C)  HR:  [55-67] 55  Resp:  [16-18] 16  BP: (102-134)/(50-59) 103/52  SpO2:  [91 %-96 %] 96 %  Body mass index is 30 83 kg/m²  Input and Output Summary (last 24 hours): Intake/Output Summary (Last 24 hours) at 2022 1236  Last data filed at 2022 1145  Gross per 24 hour   Intake 400 ml   Output 600 ml   Net -200 ml       Physical Exam:   Physical Exam  Vitals and nursing note reviewed  Constitutional:       Appearance: He is well-developed  HENT:      Head: Normocephalic and atraumatic  Eyes:      Conjunctiva/sclera: Conjunctivae normal    Cardiovascular:      Rate and Rhythm: Normal rate and regular rhythm  Heart sounds: No murmur heard    Pulmonary:      Effort: Pulmonary effort is normal  No respiratory distress  Breath sounds: Normal breath sounds  Abdominal:      Palpations: Abdomen is soft  Tenderness: There is no abdominal tenderness  Musculoskeletal:      Cervical back: Neck supple  Comments: Left hip pain with active/passive movement   Skin:     General: Skin is warm and dry  Capillary Refill: Capillary refill takes less than 2 seconds  Neurological:      General: No focal deficit present  Mental Status: He is alert and oriented to person, place, and time  Mental status is at baseline     Psychiatric:         Mood and Affect: Mood normal          Behavior: Behavior normal          Additional Data:     Labs:  Results from last 7 days   Lab Units 07/07/22  0437   WBC Thousand/uL 9 56   HEMOGLOBIN g/dL 9 8*   HEMATOCRIT % 30 7*   PLATELETS Thousands/uL 236   NEUTROS PCT % 68   LYMPHS PCT % 16   MONOS PCT % 14*   EOS PCT % 1     Results from last 7 days   Lab Units 07/07/22  0437 07/06/22  1856   SODIUM mmol/L 135* 138   POTASSIUM mmol/L 4 3 4 3   CHLORIDE mmol/L 98* 100   CO2 mmol/L 25 25   BUN mg/dL 41* 40*   CREATININE mg/dL 6 92* 6 87*   ANION GAP mmol/L 12 13   CALCIUM mg/dL 8 5 8 8   ALBUMIN g/dL  --  3 4*   TOTAL BILIRUBIN mg/dL  --  0 54   ALK PHOS U/L  --  121*   ALT U/L  --  43   AST U/L  --  24   GLUCOSE RANDOM mg/dL 113 154*         Results from last 7 days   Lab Units 07/09/22  1054 07/09/22  0708 07/08/22  2057 07/08/22  1557 07/08/22  1116 07/08/22  0741 07/07/22  2255 07/07/22  1523 07/07/22  1137 07/07/22  0716 07/06/22  2242   POC GLUCOSE mg/dl 212* 172* 233* 183* 127 88 117 130 114 108 124     Results from last 7 days   Lab Units 07/07/22  0437   HEMOGLOBIN A1C % 6 6*           Lines/Drains:  Invasive Devices  Report    Peripheral Intravenous Line  Duration           Peripheral IV 07/09/22 Proximal;Right;Ventral (anterior) Forearm <1 day          Hemodialysis Catheter  Duration           HD Permanent Double Catheter 162 days Imaging:  Imaging reviewed today    Recent Cultures (last 7 days):         Last 24 Hours Medication List:   Current Facility-Administered Medications   Medication Dose Route Frequency Provider Last Rate    acetaminophen  650 mg Oral Q6H PRN Gianna Plum, PA-C      allopurinol  100 mg Oral Daily Brina Vecellio, PA-C      atorvastatin  80 mg Oral HS Brina Vecellio, PA-C      carvedilol  12 5 mg Oral BID Gianna Plum, PA-C      clopidogrel  75 mg Oral Daily Brina Vecellio, PA-C      heparin (porcine)  5,000 Units Subcutaneous Q8H [address] Brina Vecellio, PA-C      HYDROmorphone  0 2 mg Intravenous Q4H PRN Stas Magic, DO      insulin lispro  1-6 Units Subcutaneous TID AC Brina Vecellio, PA-C      insulin lispro  1-6 Units Subcutaneous HS Brina Vecellio, PA-C      NIFEdipine  30 mg Oral BID Gianna Plum, PA-C      ondansetron  4 mg Intravenous Q6H PRN Gianna Plum, PA-C      oxyCODONE  10 mg Oral Q4H PRN Stas Magic, DO      oxyCODONE  5 mg Oral Q4H PRN Stas Magic, DO      pneumococcal 23-valent polysaccharide vaccine  0 5 mL Subcutaneous Prior to discharge Lore Silver DO      [START ON 7/10/2022] predniSONE  40 mg Oral Daily Freddy Carrasquillo DO      Followed by   Jocelin Shepard ON 7/12/2022] predniSONE  20 mg Oral Daily Freddy Carrasquillo DO      Followed by   Jocelin Shepard ON 7/14/2022] predniSONE  10 mg Oral Daily Freddy Carrasquillo DO      sevelamer  800 mg Oral TID With Meals Gianna DENISSE Salmeron          Today, Patient Was Seen By: Aileen Nair DO    **Please Note: This note may have been constructed using a voice recognition system  **

## 2022-07-10 ENCOUNTER — APPOINTMENT (INPATIENT)
Dept: RADIOLOGY | Facility: HOSPITAL | Age: 62
DRG: 553 | End: 2022-07-10
Payer: MEDICARE

## 2022-07-10 LAB
ANION GAP SERPL CALCULATED.3IONS-SCNC: 11 MMOL/L (ref 4–13)
BASOPHILS # BLD AUTO: 0.01 THOUSANDS/ΜL (ref 0–0.1)
BASOPHILS NFR BLD AUTO: 0 % (ref 0–1)
BUN SERPL-MCNC: 51 MG/DL (ref 5–25)
CALCIUM SERPL-MCNC: 8.3 MG/DL (ref 8.3–10.1)
CHLORIDE SERPL-SCNC: 94 MMOL/L (ref 100–108)
CO2 SERPL-SCNC: 27 MMOL/L (ref 21–32)
CREAT SERPL-MCNC: 5.39 MG/DL (ref 0.6–1.3)
EOSINOPHIL # BLD AUTO: 0 THOUSAND/ΜL (ref 0–0.61)
EOSINOPHIL NFR BLD AUTO: 0 % (ref 0–6)
ERYTHROCYTE [DISTWIDTH] IN BLOOD BY AUTOMATED COUNT: 15.6 % (ref 11.6–15.1)
GFR SERPL CREATININE-BSD FRML MDRD: 10 ML/MIN/1.73SQ M
GLUCOSE SERPL-MCNC: 143 MG/DL (ref 65–140)
GLUCOSE SERPL-MCNC: 151 MG/DL (ref 65–140)
GLUCOSE SERPL-MCNC: 185 MG/DL (ref 65–140)
GLUCOSE SERPL-MCNC: 242 MG/DL (ref 65–140)
GLUCOSE SERPL-MCNC: 276 MG/DL (ref 65–140)
HCT VFR BLD AUTO: 32.7 % (ref 36.5–49.3)
HGB BLD-MCNC: 10.8 G/DL (ref 12–17)
IMM GRANULOCYTES # BLD AUTO: 0.1 THOUSAND/UL (ref 0–0.2)
IMM GRANULOCYTES NFR BLD AUTO: 1 % (ref 0–2)
LYMPHOCYTES # BLD AUTO: 0.84 THOUSANDS/ΜL (ref 0.6–4.47)
LYMPHOCYTES NFR BLD AUTO: 8 % (ref 14–44)
MCH RBC QN AUTO: 28.8 PG (ref 26.8–34.3)
MCHC RBC AUTO-ENTMCNC: 33 G/DL (ref 31.4–37.4)
MCV RBC AUTO: 87 FL (ref 82–98)
MONOCYTES # BLD AUTO: 1.17 THOUSAND/ΜL (ref 0.17–1.22)
MONOCYTES NFR BLD AUTO: 12 % (ref 4–12)
NEUTROPHILS # BLD AUTO: 8.07 THOUSANDS/ΜL (ref 1.85–7.62)
NEUTS SEG NFR BLD AUTO: 79 % (ref 43–75)
NRBC BLD AUTO-RTO: 0 /100 WBCS
PLATELET # BLD AUTO: 281 THOUSANDS/UL (ref 149–390)
PMV BLD AUTO: 10 FL (ref 8.9–12.7)
POTASSIUM SERPL-SCNC: 4.5 MMOL/L (ref 3.5–5.3)
RBC # BLD AUTO: 3.75 MILLION/UL (ref 3.88–5.62)
SODIUM SERPL-SCNC: 132 MMOL/L (ref 136–145)
WBC # BLD AUTO: 10.19 THOUSAND/UL (ref 4.31–10.16)

## 2022-07-10 PROCEDURE — 82948 REAGENT STRIP/BLOOD GLUCOSE: CPT

## 2022-07-10 PROCEDURE — 85025 COMPLETE CBC W/AUTO DIFF WBC: CPT | Performed by: STUDENT IN AN ORGANIZED HEALTH CARE EDUCATION/TRAINING PROGRAM

## 2022-07-10 PROCEDURE — 80048 BASIC METABOLIC PNL TOTAL CA: CPT | Performed by: STUDENT IN AN ORGANIZED HEALTH CARE EDUCATION/TRAINING PROGRAM

## 2022-07-10 PROCEDURE — 73502 X-RAY EXAM HIP UNI 2-3 VIEWS: CPT

## 2022-07-10 PROCEDURE — 99232 SBSQ HOSP IP/OBS MODERATE 35: CPT | Performed by: STUDENT IN AN ORGANIZED HEALTH CARE EDUCATION/TRAINING PROGRAM

## 2022-07-10 RX ADMIN — HEPARIN SODIUM 5000 UNITS: 5000 INJECTION INTRAVENOUS; SUBCUTANEOUS at 21:11

## 2022-07-10 RX ADMIN — INSULIN LISPRO 3 UNITS: 100 INJECTION, SOLUTION INTRAVENOUS; SUBCUTANEOUS at 21:11

## 2022-07-10 RX ADMIN — INSULIN LISPRO 3 UNITS: 100 INJECTION, SOLUTION INTRAVENOUS; SUBCUTANEOUS at 17:03

## 2022-07-10 RX ADMIN — CLOPIDOGREL BISULFATE 75 MG: 75 TABLET ORAL at 08:42

## 2022-07-10 RX ADMIN — CARVEDILOL 12.5 MG: 12.5 TABLET, FILM COATED ORAL at 17:03

## 2022-07-10 RX ADMIN — ATORVASTATIN CALCIUM 80 MG: 80 TABLET, FILM COATED ORAL at 21:11

## 2022-07-10 RX ADMIN — HEPARIN SODIUM 5000 UNITS: 5000 INJECTION INTRAVENOUS; SUBCUTANEOUS at 05:42

## 2022-07-10 RX ADMIN — INSULIN LISPRO 1 UNITS: 100 INJECTION, SOLUTION INTRAVENOUS; SUBCUTANEOUS at 12:04

## 2022-07-10 RX ADMIN — INSULIN LISPRO 1 UNITS: 100 INJECTION, SOLUTION INTRAVENOUS; SUBCUTANEOUS at 07:36

## 2022-07-10 RX ADMIN — OXYCODONE HYDROCHLORIDE 10 MG: 10 TABLET ORAL at 22:29

## 2022-07-10 RX ADMIN — PREDNISONE 40 MG: 20 TABLET ORAL at 08:42

## 2022-07-10 RX ADMIN — OXYCODONE HYDROCHLORIDE 10 MG: 10 TABLET ORAL at 07:35

## 2022-07-10 RX ADMIN — HEPARIN SODIUM 5000 UNITS: 5000 INJECTION INTRAVENOUS; SUBCUTANEOUS at 14:31

## 2022-07-10 RX ADMIN — CARVEDILOL 12.5 MG: 12.5 TABLET, FILM COATED ORAL at 08:42

## 2022-07-10 RX ADMIN — SEVELAMER HYDROCHLORIDE 800 MG: 800 TABLET, FILM COATED PARENTERAL at 07:35

## 2022-07-10 RX ADMIN — SEVELAMER HYDROCHLORIDE 800 MG: 800 TABLET, FILM COATED PARENTERAL at 12:04

## 2022-07-10 RX ADMIN — NIFEDIPINE 30 MG: 30 TABLET, EXTENDED RELEASE ORAL at 17:03

## 2022-07-10 RX ADMIN — ALLOPURINOL 100 MG: 100 TABLET ORAL at 08:42

## 2022-07-10 RX ADMIN — SEVELAMER HYDROCHLORIDE 800 MG: 800 TABLET, FILM COATED PARENTERAL at 17:03

## 2022-07-10 RX ADMIN — NIFEDIPINE 30 MG: 30 TABLET, EXTENDED RELEASE ORAL at 08:42

## 2022-07-10 NOTE — ASSESSMENT & PLAN NOTE
· ESRD on HD TTS  · Nephrology consulted for HD management    Results from last 7 days   Lab Units 07/10/22  0549 07/07/22  0437 07/06/22  1856   BUN mg/dL 51* 41* 40*   CREATININE mg/dL 5 39* 6 92* 6 87*   EGFR ml/min/1 73sq m 10 7 7

## 2022-07-10 NOTE — PROGRESS NOTES
[address]  Progress Note - Hieu Douse 1960, 60 y o  male MRN: [id number]  Unit/Bed#: [address] [place] [address] Encounter: [id number]  Primary Care Provider: Moira Russo MD   Date and time admitted to hospital: 7/6/2022  6:21 PM    Left leg swelling  Assessment & Plan  · Venous duplex ordered without DVT  · Monitor outpatient for resolution  · Appears improved    History of TIA (transient ischemic attack)  Assessment & Plan  · Continue clopidogrel and atorvastatin    Hypertension  Assessment & Plan  · Continue carvedilol and nifedipine  · Blood pressure remains stable    Type 2 diabetes mellitus, without long-term current use of insulin [facility])  Assessment & Plan  Lab Results   Component Value Date    HGBA1C 6 6 (H) 07/07/2022     Recent Labs     07/09/22  1054 07/09/22  1618 07/09/22  2146 07/10/22  0715   POCGLU 212* 179* 229* 151*     · Prior to admission on linagliptin  Continue sliding scale during hospitalization      ESRD (end stage renal disease) ([facility] [address] )  Assessment & Plan  · ESRD on HD TTS  · Nephrology consulted for HD management    Results from last 7 days   Lab Units 07/10/22  0549 07/07/22  0437 07/06/22  1856   BUN mg/dL 51* 41* 40*   CREATININE mg/dL 5 39* 6 92* 6 87*   EGFR ml/min/1 73sq m 10 7 7       * Left inguinal pain  Assessment & Plan  58-year-old man with a history of ESRD on HD TTS diabetes and hypertension presented to the hospital with worsening left hip/inguinal pain, noted severe left hip osteoarthritis per patient    · Does have lymphadenopathy (improving) which will need repeat imaging as outpatient to ensure resolution  · Question of cystitis however given negative urinalysis, antibiotics were not reordered  · PT recommending outpatient care, should see orthopedic for further evaluation  · Continue pain control as needed  · Concern for gout history, acute gout flare, trialed on prednisone taper which has somewhat helped    Patient notes pain worsening this morning  He did state does not feel safe living by himself in going home today  Positive leg roll on exam, very limited internal/external passive hip motion  Re-evaluate left hip with x-ray imaging  We discussed building confidence walking, will trial this today  Follow up with patient on x-ray imaging and game plan leaving hospital to follow-up with specialty service, coordinate with cm on hopeful discharge  VTE Pharmacologic Prophylaxis: VTE Score: 3 Moderate Risk (Score 3-4) - Pharmacological DVT Prophylaxis Ordered: heparin  Patient Centered Rounds: I performed bedside rounds with nursing staff today  Discussions with Specialists or Other Care Team Provider:  ELOISE    Education and Discussions with Family / Patient:  Patient notes he will update family    Time Spent for Care: 20 minutes  More than 50% of total time spent on counseling and coordination of care as described above  Current Length of Stay: 3 day(s)  Current Patient Status: Inpatient   Certification Statement: The patient will continue to require additional inpatient hospital stay due to Uncontrolled left hip pain, feels unsafe at home  Discharge Plan: Anticipate discharge tomorrow to home with home services  Code Status: Level 1 - Full Code    Subjective:   Patient seen examined at bedside this morning  Patient notes he does not feel optimistic about going home as he feels unsafe and pain is worsening in left hip  We discussed stretching exercises, following up with physical therapy, obtaining x-ray imaging today  He wants to see how today goes to build confidence that he can get around at home by himself  Objective:     Vitals:   Temp (24hrs), Av °F (36 7 °C), Min:97 9 °F (36 6 °C), Max:98 2 °F (36 8 °C)    Temp:  [97 9 °F (36 6 °C)-98 2 °F (36 8 °C)] 97 9 °F (36 6 °C)  HR:  [55-77] 71  Resp:  [16-18] 18  BP: ()/(50-65) 134/62  SpO2:  [91 %-96 %] 96 %  Body mass index is 30 83 kg/m²       Input and Output Summary (last 24 hours): Intake/Output Summary (Last 24 hours) at 7/10/2022 1114  Last data filed at 7/10/2022 0600  Gross per 24 hour   Intake 700 ml   Output 2900 ml   Net -2200 ml       Physical Exam:   Physical Exam  Constitutional:       General: He is not in acute distress  Appearance: He is normal weight  HENT:      Head: Normocephalic and atraumatic  Eyes:      General: No scleral icterus  Pupils: Pupils are equal, round, and reactive to light  Cardiovascular:      Rate and Rhythm: Normal rate and regular rhythm  Pulses: Normal pulses  Heart sounds: No murmur heard  Pulmonary:      Effort: Pulmonary effort is normal  No respiratory distress  Breath sounds: No wheezing or rales  Abdominal:      General: Bowel sounds are normal  There is no distension  Tenderness: There is no abdominal tenderness  Musculoskeletal:      Comments: Limited range of motion left hip  Positive logroll  Limited motion especially in internal/external left hip rotation   Skin:     General: Skin is warm  Capillary Refill: Capillary refill takes less than 2 seconds  Coloration: Skin is not jaundiced  Neurological:      General: No focal deficit present  Mental Status: He is alert and oriented to person, place, and time  Mental status is at baseline     Psychiatric:         Behavior: Behavior normal          Additional Data:     Labs:  Results from last 7 days   Lab Units 07/10/22  0549   WBC Thousand/uL 10 19*   HEMOGLOBIN g/dL 10 8*   HEMATOCRIT % 32 7*   PLATELETS Thousands/uL 281   NEUTROS PCT % 79*   LYMPHS PCT % 8*   MONOS PCT % 12   EOS PCT % 0     Results from last 7 days   Lab Units 07/10/22  0549 07/07/22  0437 07/06/22  1856   SODIUM mmol/L 132*   < > 138   POTASSIUM mmol/L 4 5   < > 4 3   CHLORIDE mmol/L 94*   < > 100   CO2 mmol/L 27   < > 25   BUN mg/dL 51*   < > 40*   CREATININE mg/dL 5 39*   < > 6 87*   ANION GAP mmol/L 11   < > 13   CALCIUM mg/dL 8 3   < > 8 8   ALBUMIN g/dL  --   --  3 4*   TOTAL BILIRUBIN mg/dL  --   --  0 54   ALK PHOS U/L  --   --  121*   ALT U/L  --   --  43   AST U/L  --   --  24   GLUCOSE RANDOM mg/dL 143*   < > 154*    < > = values in this interval not displayed           Results from last 7 days   Lab Units 07/10/22  0715 07/09/22  2146 07/09/22  1618 07/09/22  1054 07/09/22  0708 07/08/22  2057 07/08/22  1557 07/08/22  1116 07/08/22  0741 07/07/22  2255 07/07/22  1523 07/07/22  1137   POC GLUCOSE mg/dl 151* 229* 179* 212* 172* 233* 183* 127 88 117 130 114     Results from last 7 days   Lab Units 07/07/22  0437   HEMOGLOBIN A1C % 6 6*           Lines/Drains:  Invasive Devices  Report    Peripheral Intravenous Line  Duration           Peripheral IV 07/09/22 Proximal;Right;Ventral (anterior) Forearm 1 day          Hemodialysis Catheter  Duration           HD Permanent Double Catheter 163 days                      Imaging:  X-ray imaging pending    Recent Cultures (last 7 days):         Last 24 Hours Medication List:   Current Facility-Administered Medications   Medication Dose Route Frequency Provider Last Rate    acetaminophen  650 mg Oral Q6H PRN Adelina Wilder PA-C      allopurinol  100 mg Oral Daily Brina Milton PA-C      atorvastatin  80 mg Oral HS Brina Milton PA-C      carvedilol  12 5 mg Oral BID Adelina Wilder PA-C      clopidogrel  75 mg Oral Daily Brina Milton PA-C      heparin (porcine)  5,000 Units Subcutaneous Q8H [facility] Brina Milton PA-C      HYDROmorphone  0 2 mg Intravenous Q4H PRN Chela Cordia, DO      insulin lispro  1-6 Units Subcutaneous TID AC Brina Milton PA-C      insulin lispro  1-6 Units Subcutaneous HS Brina Milton PA-C      NIFEdipine  30 mg Oral BID Brina Milton PA-C      ondansetron  4 mg Intravenous Q6H PRN Adelina Wilder PA-C      oxyCODONE  10 mg Oral Q4H PRN Chela Cordia, DO      oxyCODONE  5 mg Oral Q4H PRN Chela Jansen, DO      pneumococcal 23-valent polysaccharide vaccine  0 5 mL Subcutaneous Prior to discharge Lore Silver DO      predniSONE  40 mg Oral Daily Freddy Carrasquillo DO      Followed by   Noni Soni ON 7/12/2022] predniSONE  20 mg Oral Daily Freddy Carrasquillo DO      Followed by   Noni Soni ON 7/14/2022] predniSONE  10 mg Oral Daily Freddy Carrasquillo DO      sevelamer  800 mg Oral TID With Meals Ana Maria Borges PA-C          Today, Patient Was Seen By: Ashwin Urban DO    **Please Note: This note may have been constructed using a voice recognition system  **

## 2022-07-10 NOTE — PLAN OF CARE
Problem: Potential for Falls  Goal: Patient will remain free of falls  Description: INTERVENTIONS:  - Educate patient/family on patient safety including physical limitations  - Instruct patient to call for assistance with activity   - Consult OT/PT to assist with strengthening/mobility   - Keep Call bell within reach  - Keep bed low and locked with side rails adjusted as appropriate  - Keep care items and personal belongings within reach  - Initiate and maintain comfort rounds  - Make Fall Risk Sign visible to staff  - Offer Toileting every 2 Hours, in advance of need  - Initiate/Maintain bed alarm  - Obtain necessary fall risk management equipment: bed alarm, yellow socks, walker   - Apply yellow socks and bracelet for high fall risk patients  - Consider moving patient to room near nurses station  Outcome: Progressing     Problem: MOBILITY - ADULT  Goal: Maintain or return to baseline ADL function  Description: INTERVENTIONS:  -  Assess patient's ability to carry out ADLs; assess patient's baseline for ADL function and identify physical deficits which impact ability to perform ADLs (bathing, care of mouth/teeth, toileting, grooming, dressing, etc )  - Assess/evaluate cause of self-care deficits   - Assess range of motion  - Assess patient's mobility; develop plan if impaired  - Assess patient's need for assistive devices and provide as appropriate  - Encourage maximum independence but intervene and supervise when necessary  - Involve family in performance of ADLs  - Assess for home care needs following discharge   - Consider OT consult to assist with ADL evaluation and planning for discharge  - Provide patient education as appropriate  Outcome: Progressing  Goal: Maintains/Returns to pre admission functional level  Description: INTERVENTIONS:  - Perform BMAT or MOVE assessment daily    - Set and communicate daily mobility goal to care team and patient/family/caregiver     - Collaborate with rehabilitation services on mobility goals if consulted  - Perform Range of Motion 3 times a day  - Reposition patient every 2 hours  - Dangle patient 3 times a day  - Stand patient 3 times a day  - Ambulate patient 3 times a day  - Out of bed to chair 3 times a day   - Out of bed for meals 3 times a day  - Out of bed for toileting  - Record patient progress and toleration of activity level   Outcome: Progressing     Problem: Prexisting or High Potential for Compromised Skin Integrity  Goal: Skin integrity is maintained or improved  Description: INTERVENTIONS:  - Identify patients at risk for skin breakdown  - Assess and monitor skin integrity  - Assess and monitor nutrition and hydration status  - Monitor labs   - Assess for incontinence   - Turn and reposition patient  - Assist with mobility/ambulation  - Relieve pressure over bony prominences  - Avoid friction and shearing  - Provide appropriate hygiene as needed including keeping skin clean and dry  - Evaluate need for skin moisturizer/barrier cream  - Collaborate with interdisciplinary team   - Patient/family teaching  - Consider wound care consult   Outcome: Progressing     Problem: Nutrition/Hydration-ADULT  Goal: Nutrient/Hydration intake appropriate for improving, restoring or maintaining nutritional needs  Description: Monitor and assess patient's nutrition/hydration status for malnutrition  Collaborate with interdisciplinary team and initiate plan and interventions as ordered  Monitor patient's weight and dietary intake as ordered or per policy  Utilize nutrition screening tool and intervene as necessary  Determine patient's food preferences and provide high-protein, high-caloric foods as appropriate       INTERVENTIONS:  - Monitor oral intake, urinary output, labs, and treatment plans  - Assess nutrition and hydration status and recommend course of action  - Evaluate amount of meals eaten  - Assist patient with eating if necessary   - Allow adequate time for meals  - Recommend/ encourage appropriate diets, oral nutritional supplements, and vitamin/mineral supplements  - Order, calculate, and assess calorie counts as needed  - Recommend, monitor, and adjust tube feedings and TPN/PPN based on assessed needs  - Assess need for intravenous fluids  - Provide specific nutrition/hydration education as appropriate  - Include patient/family/caregiver in decisions related to nutrition  Outcome: Progressing     Problem: METABOLIC, FLUID AND ELECTROLYTES - ADULT  Goal: Electrolytes maintained within normal limits  Description: INTERVENTIONS:  - Monitor labs and assess patient for signs and symptoms of electrolyte imbalances  - Administer electrolyte replacement as ordered  - Monitor response to electrolyte replacements, including repeat lab results as appropriate  - Instruct patient on fluid and nutrition as appropriate  Outcome: Progressing  Goal: Fluid balance maintained  Description: INTERVENTIONS:  - Monitor labs   - Monitor I/O and WT  - Instruct patient on fluid and nutrition as appropriate  - Assess for signs & symptoms of volume excess or deficit  Outcome: Progressing

## 2022-07-10 NOTE — ASSESSMENT & PLAN NOTE
79-year-old man with a history of ESRD on HD TTS diabetes and hypertension presented to the hospital with worsening left hip/inguinal pain, noted severe left hip osteoarthritis per patient  · Does have lymphadenopathy (improving) which will need repeat imaging as outpatient to ensure resolution  · Question of cystitis however given negative urinalysis, antibiotics were not reordered  · PT recommending outpatient care, should see orthopedic for further evaluation  · Continue pain control as needed  · Concern for gout history, acute gout flare, trialed on prednisone taper which has somewhat helped    Patient notes pain worsening this morning  He did state does not feel safe living by himself in going home today  Positive leg roll on exam, very limited internal/external passive hip motion  Re-evaluate left hip with x-ray imaging  We discussed building confidence walking, will trial this today  Follow up with patient on x-ray imaging and game plan leaving hospital to follow-up with specialty service, coordinate with cm on hopeful discharge

## 2022-07-10 NOTE — ASSESSMENT & PLAN NOTE
Lab Results   Component Value Date    HGBA1C 6 6 (H) 07/07/2022     Recent Labs     07/09/22  1054 07/09/22  1618 07/09/22  2146 07/10/22  0715   POCGLU 212* 179* 229* 151*     · Prior to admission on linagliptin    Continue sliding scale during hospitalization

## 2022-07-11 ENCOUNTER — APPOINTMENT (INPATIENT)
Dept: RADIOLOGY | Facility: HOSPITAL | Age: 62
DRG: 553 | End: 2022-07-11
Payer: MEDICARE

## 2022-07-11 LAB
ANION GAP SERPL CALCULATED.3IONS-SCNC: 14 MMOL/L (ref 4–13)
BASOPHILS # BLD AUTO: 0.01 THOUSANDS/ΜL (ref 0–0.1)
BASOPHILS NFR BLD AUTO: 0 % (ref 0–1)
BUN SERPL-MCNC: 81 MG/DL (ref 5–25)
CALCIUM SERPL-MCNC: 7.8 MG/DL (ref 8.3–10.1)
CHLORIDE SERPL-SCNC: 96 MMOL/L (ref 100–108)
CO2 SERPL-SCNC: 26 MMOL/L (ref 21–32)
CREAT SERPL-MCNC: 6.65 MG/DL (ref 0.6–1.3)
EOSINOPHIL # BLD AUTO: 0.01 THOUSAND/ΜL (ref 0–0.61)
EOSINOPHIL NFR BLD AUTO: 0 % (ref 0–6)
ERYTHROCYTE [DISTWIDTH] IN BLOOD BY AUTOMATED COUNT: 15.6 % (ref 11.6–15.1)
GFR SERPL CREATININE-BSD FRML MDRD: 8 ML/MIN/1.73SQ M
GLUCOSE SERPL-MCNC: 170 MG/DL (ref 65–140)
GLUCOSE SERPL-MCNC: 188 MG/DL (ref 65–140)
GLUCOSE SERPL-MCNC: 191 MG/DL (ref 65–140)
GLUCOSE SERPL-MCNC: 205 MG/DL (ref 65–140)
GLUCOSE SERPL-MCNC: 238 MG/DL (ref 65–140)
HCT VFR BLD AUTO: 32.7 % (ref 36.5–49.3)
HGB BLD-MCNC: 10.8 G/DL (ref 12–17)
IMM GRANULOCYTES # BLD AUTO: 0.09 THOUSAND/UL (ref 0–0.2)
IMM GRANULOCYTES NFR BLD AUTO: 1 % (ref 0–2)
LYMPHOCYTES # BLD AUTO: 1.35 THOUSANDS/ΜL (ref 0.6–4.47)
LYMPHOCYTES NFR BLD AUTO: 14 % (ref 14–44)
MCH RBC QN AUTO: 29.1 PG (ref 26.8–34.3)
MCHC RBC AUTO-ENTMCNC: 33 G/DL (ref 31.4–37.4)
MCV RBC AUTO: 88 FL (ref 82–98)
MONOCYTES # BLD AUTO: 1.06 THOUSAND/ΜL (ref 0.17–1.22)
MONOCYTES NFR BLD AUTO: 11 % (ref 4–12)
NEUTROPHILS # BLD AUTO: 7.36 THOUSANDS/ΜL (ref 1.85–7.62)
NEUTS SEG NFR BLD AUTO: 74 % (ref 43–75)
NRBC BLD AUTO-RTO: 0 /100 WBCS
PLATELET # BLD AUTO: 285 THOUSANDS/UL (ref 149–390)
PMV BLD AUTO: 9.7 FL (ref 8.9–12.7)
POTASSIUM SERPL-SCNC: 4.8 MMOL/L (ref 3.5–5.3)
RBC # BLD AUTO: 3.71 MILLION/UL (ref 3.88–5.62)
SODIUM SERPL-SCNC: 136 MMOL/L (ref 136–145)
WBC # BLD AUTO: 9.88 THOUSAND/UL (ref 4.31–10.16)

## 2022-07-11 PROCEDURE — 97530 THERAPEUTIC ACTIVITIES: CPT

## 2022-07-11 PROCEDURE — 80048 BASIC METABOLIC PNL TOTAL CA: CPT | Performed by: INTERNAL MEDICINE

## 2022-07-11 PROCEDURE — 99232 SBSQ HOSP IP/OBS MODERATE 35: CPT | Performed by: INTERNAL MEDICINE

## 2022-07-11 PROCEDURE — 85025 COMPLETE CBC W/AUTO DIFF WBC: CPT | Performed by: INTERNAL MEDICINE

## 2022-07-11 PROCEDURE — 73700 CT LOWER EXTREMITY W/O DYE: CPT

## 2022-07-11 PROCEDURE — 82948 REAGENT STRIP/BLOOD GLUCOSE: CPT

## 2022-07-11 RX ADMIN — ATORVASTATIN CALCIUM 80 MG: 80 TABLET, FILM COATED ORAL at 21:31

## 2022-07-11 RX ADMIN — INSULIN LISPRO 3 UNITS: 100 INJECTION, SOLUTION INTRAVENOUS; SUBCUTANEOUS at 17:24

## 2022-07-11 RX ADMIN — NIFEDIPINE 30 MG: 30 TABLET, EXTENDED RELEASE ORAL at 08:29

## 2022-07-11 RX ADMIN — CARVEDILOL 12.5 MG: 12.5 TABLET, FILM COATED ORAL at 17:27

## 2022-07-11 RX ADMIN — NIFEDIPINE 30 MG: 30 TABLET, EXTENDED RELEASE ORAL at 17:26

## 2022-07-11 RX ADMIN — SEVELAMER HYDROCHLORIDE 800 MG: 800 TABLET, FILM COATED PARENTERAL at 11:46

## 2022-07-11 RX ADMIN — INSULIN LISPRO 2 UNITS: 100 INJECTION, SOLUTION INTRAVENOUS; SUBCUTANEOUS at 08:30

## 2022-07-11 RX ADMIN — HEPARIN SODIUM 5000 UNITS: 5000 INJECTION INTRAVENOUS; SUBCUTANEOUS at 13:32

## 2022-07-11 RX ADMIN — OXYCODONE HYDROCHLORIDE 10 MG: 10 TABLET ORAL at 21:49

## 2022-07-11 RX ADMIN — PREDNISONE 40 MG: 20 TABLET ORAL at 08:29

## 2022-07-11 RX ADMIN — SEVELAMER HYDROCHLORIDE 800 MG: 800 TABLET, FILM COATED PARENTERAL at 16:27

## 2022-07-11 RX ADMIN — HEPARIN SODIUM 5000 UNITS: 5000 INJECTION INTRAVENOUS; SUBCUTANEOUS at 21:31

## 2022-07-11 RX ADMIN — OXYCODONE HYDROCHLORIDE 10 MG: 10 TABLET ORAL at 17:49

## 2022-07-11 RX ADMIN — INSULIN LISPRO 1 UNITS: 100 INJECTION, SOLUTION INTRAVENOUS; SUBCUTANEOUS at 11:40

## 2022-07-11 RX ADMIN — OXYCODONE HYDROCHLORIDE 10 MG: 10 TABLET ORAL at 05:46

## 2022-07-11 RX ADMIN — HEPARIN SODIUM 5000 UNITS: 5000 INJECTION INTRAVENOUS; SUBCUTANEOUS at 05:15

## 2022-07-11 RX ADMIN — CLOPIDOGREL BISULFATE 75 MG: 75 TABLET ORAL at 08:30

## 2022-07-11 RX ADMIN — SEVELAMER HYDROCHLORIDE 800 MG: 800 TABLET, FILM COATED PARENTERAL at 08:29

## 2022-07-11 RX ADMIN — INSULIN LISPRO 2 UNITS: 100 INJECTION, SOLUTION INTRAVENOUS; SUBCUTANEOUS at 21:33

## 2022-07-11 RX ADMIN — ALLOPURINOL 100 MG: 100 TABLET ORAL at 08:29

## 2022-07-11 RX ADMIN — CARVEDILOL 12.5 MG: 12.5 TABLET, FILM COATED ORAL at 08:30

## 2022-07-11 NOTE — PHYSICAL THERAPY NOTE
PT TREATMENT     07/11/22 1205   Note Type   Note Type Treatment   Pain Assessment   Pain Assessment Tool Farr-Baker FACES   Farr-Baker FACES Pain Rating 6   Pain Location/Orientation Orientation: Left; Location: Hip   Restrictions/Precautions   Other Precautions Pain  (HD)   General   Chart Reviewed Yes   Cognition   Overall Cognitive Status WFL   Subjective   Subjective "I'm better but I was independent"   Bed Mobility   Supine to Sit 7  Independent   Transfers   Sit to Stand 5  Supervision   Stand to Sit 5  Supervision   Stand pivot 5  Supervision   Additional items   (with walker)   Ambulation/Elevation   Gait pattern   (flexed knees)   Gait Assistance 5  Supervision   Assistive Device Rolling walker   Distance 150 feet   Activity Tolerance   Activity Tolerance Patient tolerated treatment well;Patient limited by pain   Assessment   Prognosis Good   Problem List Decreased strength;Decreased range of motion; Impaired balance;Decreased mobility;Pain   Assessment Pt demonstrates much improved activity tolerance overall as pt was able to walk around the nurses station today with a walker  Pt declined trying the steps but agreed to do so tomorrow  Encouraged pt to ambulate with a walker on the unit as tolerated to improve his strength and endurance  The patient's [facility] Basic Mobility Inpatient Short Form Raw Score is 16  A Raw score of greater than 16 suggests the patient may benefit from discharge to home  Plan   Treatment/Interventions Functional transfer training;LE strengthening/ROM; Elevations; Therapeutic exercise;Patient/family training;Equipment eval/education; Bed mobility;Gait training   Progress Progressing toward goals   PT Frequency Other (Comment)  (5w)   Recommendation   PT Discharge Recommendation Home with home health rehabilitation   Equipment Recommended   (pt has a walker)   [facility] Basic Mobility Inpatient   Turning in Bed Without Bedrails 3   Lying on Back to Sitting on Edge of Flat Bed 3 Moving Bed to Chair 3   Standing Up From Chair 3   Walk in Room 3   Climb 3-5 Stairs 3   Basic Mobility Inpatient Raw Score 18   Basic Mobility Standardized Score 41 05   Highest Level Of Mobility   -HLM Goal 6: Walk 10 steps or more   -HLM Achieved 7: Walk 25 feet or more   Licensure   [place] License Number  [place]  [id number]

## 2022-07-11 NOTE — ASSESSMENT & PLAN NOTE
· ESRD on HD TTS  · Nephrology consulted for HD management    Results from last 7 days   Lab Units 07/11/22  1021 07/10/22  0549 07/07/22  0437 07/06/22  1856   BUN mg/dL 81* 51* 41* 40*   CREATININE mg/dL 6 65* 5 39* 6 92* 6 87*   EGFR ml/min/1 73sq m 8 10 7 7

## 2022-07-11 NOTE — PROGRESS NOTES
[address]  Progress Note - Manish Muniz 1960, 60 y o  male MRN: [id number]  Unit/Bed#: 2 [place] [address] Encounter: [id number]  Primary Care Provider: Kyree Salcedo MD   Date and time admitted to hospital: 7/6/2022  6:21 PM    [facility] Progress Note  Patient: Manish Muniz 60 y o  male   MRN: [id number]  PCP: Kyree Salcedo MD  Unit/Bed#: 2 [place] [address] Encounter: [id number]  Date Of Visit: 07/11/22    Problem List:    Principal Problem:    Left inguinal pain  Active Problems:    ESRD (end stage renal disease) ([facility] )    Type 2 diabetes mellitus, without long-term current use of insulin ([facility] )    Hypertension    History of TIA (transient ischemic attack)    Left leg swelling      Assessment & Plan:    * Left inguinal pain  Assessment & Plan  18-year-old man with a history of ESRD on HD TTS diabetes and hypertension presented to the hospital with worsening left hip/inguinal pain, noted severe left hip osteoarthritis per patient  · Does have lymphadenopathy (improving) which will need repeat imaging as outpatient to ensure resolution  · Question of cystitis however given negative urinalysis, antibiotics were not reordered  · PT recommending outpatient care, should see orthopedic for further evaluation  · Continue pain control as needed  · Concern for gout history, acute gout flare, trialed on prednisone taper which has somewhat helped    Patient notes pain worsening this morning  He did state does not feel safe living by himself in going home today  Positive leg roll on exam, very limited internal/external passive hip motion  Re-evaluate left hip with x-ray imaging  We discussed building confidence walking, will trial this today  Follow up with patient on x-ray imaging and game plan leaving hospital to follow-up with specialty service, coordinate with cm on hopeful discharge      7/11 Patient reports he is still having significant pain and cannot be discharged this way, will get CT of left hip, pain meds PRN, PT eval     Left leg swelling  Assessment & Plan  · Venous duplex ordered without DVT  · Monitor outpatient for resolution  · Appears improved    History of TIA (transient ischemic attack)  Assessment & Plan  · Continue clopidogrel and atorvastatin    Hypertension  Assessment & Plan  · Continue carvedilol and nifedipine  · Blood pressure remains stable    Type 2 diabetes mellitus, without long-term current use of insulin [facility])  Assessment & Plan  Lab Results   Component Value Date    HGBA1C 6 6 (H) 07/07/2022     Recent Labs     07/10/22  1614 07/10/22  2034 07/11/22  0804 07/11/22  1138   POCGLU 276* 242* 191* 188*     · Prior to admission on linagliptin  Continue sliding scale during hospitalization      ESRD (end stage renal disease) ([facility] )  Assessment & Plan  · ESRD on HD TTS  · Nephrology consulted for HD management    Results from last 7 days   Lab Units 07/11/22  1021 07/10/22  0549 07/07/22  0437 07/06/22  1856   BUN mg/dL 81* 51* 41* 40*   CREATININE mg/dL 6 65* 5 39* 6 92* 6 87*   EGFR ml/min/1 73sq m 8 10 7 7               VTE Pharmacologic Prophylaxis: VTE Score: 3 Moderate Risk (Score 3-4) - Pharmacological DVT Prophylaxis Ordered: heparin  Patient Centered Rounds: I performed bedside rounds with nursing staff today  Education and Discussions with Family / Patient: Patient declined call to   Time Spent for Care: 30 minutes  More than 50% of total time spent on counseling and coordination of care as described above  Current Length of Stay: 4 day(s)  Current Patient Status: Inpatient   Certification Statement: The patient will continue to require additional inpatient hospital stay due to Pain  Discharge Plan: Anticipate discharge in 24-48 hrs to rehab facility      Code Status: Level 1 - Full Code    Subjective:   Patient seen and examined at bedside  NAD, VSS  Patient reporting he is having significant Left hip/groin pain, reports he cannot ambulate safely, and cannot be discharged in this condition  Awaiting Hip imaging      Objective:     Vitals:   Temp (24hrs), Av 8 °F (36 6 °C), Min:97 3 °F (36 3 °C), Max:98 °F (36 7 °C)    Temp:  [97 3 °F (36 3 °C)-98 °F (36 7 °C)] 97 3 °F (36 3 °C)  HR:  [66-70] 70  Resp:  [18-20] 18  BP: (126-145)/(66-73) 139/66  SpO2:  [92 %-96 %] 96 %  Body mass index is 30 83 kg/m²  Input and Output Summary (last 24 hours):   No intake or output data in the 24 hours ending 22 1247    Physical Exam:   Constitutional:       General: He is not in acute distress  Appearance: He is normal weight  HENT:      Head: Normocephalic and atraumatic  Eyes:      General: No scleral icterus  Pupils: Pupils are equal, round, and reactive to light  Cardiovascular:      Rate and Rhythm: Normal rate and regular rhythm  Pulses: Normal pulses  Heart sounds: No murmur heard  Pulmonary:      Effort: Pulmonary effort is normal  No respiratory distress  Breath sounds: No wheezing or rales  Abdominal:      General: Bowel sounds are normal  There is no distension  Tenderness: There is no abdominal tenderness  Musculoskeletal:      Comments: Limited range of motion left hip  Limited left hip rotation   Skin:     General: Skin is warm  Capillary Refill: Capillary refill takes less than 2 seconds  Coloration: Skin is not jaundiced  Neurological:      General: No focal deficit present  Mental Status: He is alert and oriented to person, place, and time  Mental status is at baseline     Psychiatric:         Behavior: Behavior normal    Additional Data:     Labs:  Results from last 7 days   Lab Units 22  1021   WBC Thousand/uL 9 88   HEMOGLOBIN g/dL 10 8*   HEMATOCRIT % 32 7*   PLATELETS Thousands/uL 285   NEUTROS PCT % 74   LYMPHS PCT % 14   MONOS PCT % 11   EOS PCT % 0     Results from last 7 days   Lab Units 22  1021 22  0437 22  1856   SODIUM mmol/L 136   < > 138   POTASSIUM mmol/L 4 8   < > 4 3   CHLORIDE mmol/L 96*   < > 100   CO2 mmol/L 26   < > 25   BUN mg/dL 81*   < > 40*   CREATININE mg/dL 6 65*   < > 6 87*   ANION GAP mmol/L 14*   < > 13   CALCIUM mg/dL 7 8*   < > 8 8   ALBUMIN g/dL  --   --  3 4*   TOTAL BILIRUBIN mg/dL  --   --  0 54   ALK PHOS U/L  --   --  121*   ALT U/L  --   --  43   AST U/L  --   --  24   GLUCOSE RANDOM mg/dL 170*   < > 154*    < > = values in this interval not displayed  Results from last 7 days   Lab Units 07/11/22  1138 07/11/22  0804 07/10/22  2034 07/10/22  1614 07/10/22  1158 07/10/22  0715 07/09/22  2146 07/09/22  1618 07/09/22  1054 07/09/22  0708 07/08/22  2057 07/08/22  1557   POC GLUCOSE mg/dl 188* 191* 242* 276* 185* 151* 229* 179* 212* 172* 233* 183*     Results from last 7 days   Lab Units 07/07/22  0437   HEMOGLOBIN A1C % 6 6*           Lines/Drains:  Invasive Devices  Report    Peripheral Intravenous Line  Duration           Peripheral IV 07/09/22 Proximal;Right;Ventral (anterior) Forearm 2 days          Hemodialysis Catheter  Duration           HD Permanent Double Catheter 164 days                      Imaging: No pertinent imaging reviewed      Recent Cultures (last 7 days):         Last 24 Hours Medication List:   Current Facility-Administered Medications   Medication Dose Route Frequency Provider Last Rate    acetaminophen  650 mg Oral Q6H PRN Nelwyn Thierno, PA-C      allopurinol  100 mg Oral Daily Brina Vecvahido, PA-C      atorvastatin  80 mg Oral HS Brina Vecvahido, PA-C      carvedilol  12 5 mg Oral BID Nelwyn Thierno PA-C      clopidogrel  75 mg Oral Daily Brina Vecvahido, PA-C      heparin (porcine)  5,000 Units Subcutaneous Q8H [address] Brina Vecvahido, PA-C      HYDROmorphone  0 2 mg Intravenous Q4H PRN Amarilis Sugey, DO      insulin lispro  1-6 Units Subcutaneous TID AC Brina Vecellio, PA-C      insulin lispro  1-6 Units Subcutaneous HS Charity Pa PA-C      NIFEdipine  30 mg Oral BID Charity Pa PA-C      ondansetron  4 mg Intravenous Q6H PRN Charity Pa PA-C      oxyCODONE  10 mg Oral Q4H PRN Amarilis Sugey, DO      oxyCODONE  5 mg Oral Q4H PRN Amarilis Sugey, DO      pneumococcal 23-valent polysaccharide vaccine  0 5 mL Subcutaneous Prior to discharge Lore Silver DO      [START ON 7/12/2022] predniSONE  20 mg Oral Daily Freddy Carrasquillo DO      Followed by   Elaine Gilmore ON 7/14/2022] predniSONE  10 mg Oral Daily Freddy Carrasquillo,       sevelamer  800 mg Oral TID With Meals Charity Pa PA-C          Today, Patient Was Seen By: Rose Mary Bermudez MD    ** Please Note: "This note has been constructed using a voice recognition system  Therefore there may be syntax, spelling, and/or grammatical errors   Please call if you have any questions  "**

## 2022-07-11 NOTE — PLAN OF CARE
Problem: Potential for Falls  Goal: Patient will remain free of falls  Description: INTERVENTIONS:  - Educate patient/family on patient safety including physical limitations  - Instruct patient to call for assistance with activity   - Consult OT/PT to assist with strengthening/mobility   - Keep Call bell within reach  - Keep bed low and locked with side rails adjusted as appropriate  - Keep care items and personal belongings within reach  - Initiate and maintain comfort rounds  - Make Fall Risk Sign visible to staff  - Offer Toileting every 2 Hours, in advance of need  - Initiate/Maintain bed alarm  - Obtain necessary fall risk management equipment: yellow socks, call bell  - Apply yellow socks and bracelet for high fall risk patients  - Consider moving patient to room near nurses station  Outcome: Progressing     Problem: MOBILITY - ADULT  Goal: Maintain or return to baseline ADL function  Description: INTERVENTIONS:  -  Assess patient's ability to carry out ADLs; assess patient's baseline for ADL function and identify physical deficits which impact ability to perform ADLs (bathing, care of mouth/teeth, toileting, grooming, dressing, etc )  - Assess/evaluate cause of self-care deficits   - Assess range of motion  - Assess patient's mobility; develop plan if impaired  - Assess patient's need for assistive devices and provide as appropriate  - Encourage maximum independence but intervene and supervise when necessary  - Involve family in performance of ADLs  - Assess for home care needs following discharge   - Consider OT consult to assist with ADL evaluation and planning for discharge  - Provide patient education as appropriate  Outcome: Progressing  Goal: Maintains/Returns to pre admission functional level  Description: INTERVENTIONS:  - Perform BMAT or MOVE assessment daily    - Set and communicate daily mobility goal to care team and patient/family/caregiver     - Collaborate with rehabilitation services on mobility goals if consulted  - Perform Range of Motion 3 times a day  - Reposition patient every 2 hours  - Dangle patient 4 times a day  - Stand patient 3 times a day  - Ambulate patient 3 times a day  - Out of bed to chair 3 times a day   - Out of bed for meals 3 times a day  - Out of bed for toileting  - Record patient progress and toleration of activity level   Outcome: Progressing     Problem: Prexisting or High Potential for Compromised Skin Integrity  Goal: Skin integrity is maintained or improved  Description: INTERVENTIONS:  - Identify patients at risk for skin breakdown  - Assess and monitor skin integrity  - Assess and monitor nutrition and hydration status  - Monitor labs   - Assess for incontinence   - Turn and reposition patient  - Assist with mobility/ambulation  - Relieve pressure over bony prominences  - Avoid friction and shearing  - Provide appropriate hygiene as needed including keeping skin clean and dry  - Evaluate need for skin moisturizer/barrier cream  - Collaborate with interdisciplinary team   - Patient/family teaching  - Consider wound care consult   Outcome: Progressing     Problem: Nutrition/Hydration-ADULT  Goal: Nutrient/Hydration intake appropriate for improving, restoring or maintaining nutritional needs  Description: Monitor and assess patient's nutrition/hydration status for malnutrition  Collaborate with interdisciplinary team and initiate plan and interventions as ordered  Monitor patient's weight and dietary intake as ordered or per policy  Utilize nutrition screening tool and intervene as necessary  Determine patient's food preferences and provide high-protein, high-caloric foods as appropriate       INTERVENTIONS:  - Monitor oral intake, urinary output, labs, and treatment plans  - Assess nutrition and hydration status and recommend course of action  - Evaluate amount of meals eaten  - Assist patient with eating if necessary   - Allow adequate time for meals  - Recommend/ encourage appropriate diets, oral nutritional supplements, and vitamin/mineral supplements  - Order, calculate, and assess calorie counts as needed  - Recommend, monitor, and adjust tube feedings and TPN/PPN based on assessed needs  - Assess need for intravenous fluids  - Provide specific nutrition/hydration education as appropriate  - Include patient/family/caregiver in decisions related to nutrition  Outcome: Progressing     Problem: METABOLIC, FLUID AND ELECTROLYTES - ADULT  Goal: Electrolytes maintained within normal limits  Description: INTERVENTIONS:  - Monitor labs and assess patient for signs and symptoms of electrolyte imbalances  - Administer electrolyte replacement as ordered  - Monitor response to electrolyte replacements, including repeat lab results as appropriate  - Instruct patient on fluid and nutrition as appropriate  Outcome: Progressing  Goal: Fluid balance maintained  Description: INTERVENTIONS:  - Monitor labs   - Monitor I/O and WT  - Instruct patient on fluid and nutrition as appropriate  - Assess for signs & symptoms of volume excess or deficit  Outcome: Progressing

## 2022-07-11 NOTE — ASSESSMENT & PLAN NOTE
Lab Results   Component Value Date    HGBA1C 6 6 (H) 07/07/2022     Recent Labs     07/10/22  1614 07/10/22  2034 07/11/22  0804 07/11/22  1138   POCGLU 276* 242* 191* 188*     · Prior to admission on linagliptin    Continue sliding scale during hospitalization

## 2022-07-11 NOTE — CASE MANAGEMENT
Case Management Progress Note    Patient name Guzman Egan  Location [facility]/ Erin Lafleur MRN [id number]  : 1960 Date 2022       LOS (days): 4  Geometric Mean LOS (GMLOS) (days): 4 20  Days to GMLOS:0 1        OBJECTIVE:        Current admission status: Inpatient  Preferred Pharmacy:   [address] [place] [address] [address], [place] [address]  Phone: [phone] Fax: [phone]    Primary Care Provider: Peter Vila MD    Primary Insurance: MEDICARE  Secondary Insurance:     PROGRESS NOTE:    There are zero accepting [place] [address] agencies, due to insurance, where pt lives, and agencies staffing ability  At this time plan is for pt to go home with no services  CM sent referrals to all agencies in area, and all 6 denied

## 2022-07-11 NOTE — ASSESSMENT & PLAN NOTE
70-year-old man with a history of ESRD on HD TTS diabetes and hypertension presented to the hospital with worsening left hip/inguinal pain, noted severe left hip osteoarthritis per patient  · Does have lymphadenopathy (improving) which will need repeat imaging as outpatient to ensure resolution  · Question of cystitis however given negative urinalysis, antibiotics were not reordered  · PT recommending outpatient care, should see orthopedic for further evaluation  · Continue pain control as needed  · Concern for gout history, acute gout flare, trialed on prednisone taper which has somewhat helped    Patient notes pain worsening this morning  He did state does not feel safe living by himself in going home today  Positive leg roll on exam, very limited internal/external passive hip motion  Re-evaluate left hip with x-ray imaging  We discussed building confidence walking, will trial this today  Follow up with patient on x-ray imaging and game plan leaving hospital to follow-up with specialty service, coordinate with cm on hopeful discharge      7/11 Patient reports he is still having significant pain and cannot be discharged this way, will get CT of left hip, pain meds PRN, PT mirna

## 2022-07-11 NOTE — CASE MANAGEMENT
Case Management Discharge Planning Note    Patient name Dirk Shaikh  Location 18 [place] [address] Erin Lafleur MRN [id number]  : 1960 Date 2022       Current Admission Date: 2022  Current Admission Diagnosis:Left inguinal pain   Patient Active Problem List    Diagnosis Date Noted    Left inguinal pain 2022    Left leg swelling 2022    Hemodialysis status ([facility] )     Hypervolemia     Anemia 2022    ESRD (end stage renal disease) ([facility] ) 2022    Type 2 diabetes mellitus, without long-term current use of insulin [facility] )     Hypertension     History of TIA (transient ischemic attack)     T10 vertebral fracture (HCC)       LOS (days): 4  Geometric Mean LOS (GMLOS) (days): 4 20  Days to GMLOS:0 3     OBJECTIVE:  Risk of Unplanned Readmission Score: 23 19         Current admission status: Inpatient   Preferred Pharmacy:   [address] [place] [id number]  Phone: [phone] Fax: [phone]    Primary Care Provider: Darek Pichardo MD    Primary Insurance: MEDICARE  Secondary Insurance:     DISCHARGE DETAILS:    Discharge planning discussed with[de-identified] patient  Freedom of Choice: Yes  Comments - Freedom of Choice: is open to c, ok with blanket referrals and will pick from accepting agencies     Were Treatment Team discharge recommendations reviewed with patient/caregiver?: Yes  Did patient/caregiver verbalize understanding of patient care needs?: Yes  Were patient/caregiver advised of the risks associated with not following Treatment Team discharge recommendations?: Yes         [address] [place] [address]         Is the patient interested in [place] [address] at discharge?: Yes  [address] requested[de-identified] Nursing, Occupational Therapy, Physical [address] Name[de-identified] Other  [facility] Provider[de-identified] PCP  [address] Needed[de-identified] Strengthening/Theraputic Exercises to Improve Function, Gait/ADL Training, Evaluate Functional Status and Safety  Homebound Criteria Met[de-identified] Uses an Assist Device (i e  cane, walker, etc)  Supporting Clincal Findings[de-identified] Limited Endurance    DME Referral Provided  Referral made for DME?: No         Would you like to participate in our [address] service program?  : No - Declined    Treatment Team Recommendation: Home with [address] [facility]

## 2022-07-11 NOTE — PROGRESS NOTES
[facility] PROGRESS NOTE   Kostas Lowery 60 y o  male MRN: [id number]  Unit/Bed#: [address] [place] [address] Encounter: [id number]  Reason for Consult: ESRD    ASSESSMENT and PLAN:    0 Bethany Hernandez - 59 yo male with PMHx of ESRD, CAD, HTN, TIA, DM, p/w L groin pain  Nephrology on board for ESRD    1) ESRD    - HD TTS [address]  - dialysis dependent since 2/2022  - has some residual renal function  - Access - R IJ Perm cath    Plan:    - next HD TTS-on Tuesday    2) L groin pain    - per primary team  - Xray hip -  - duplex neg 7/7 but tech diff study  - Echogenic structure in L inguinal region consistent with enlarged lymph node  - CT scan - inguinal region unremarkable  - will need outpt f/u to ensure resolution of lymph node enlargement  -further plans per primary team    3) anemia    - stable Hb    4) MBD    - cont phos binder    5) electrolytes-await repeat BMP    6) Acid/base-await repeat BMP    7) HTN    - initially with accelerated HTN  Now normotensive    SUBJECTIVE / 24H INTERVAL HISTORY:    Patient still with left groin pain  Blood pressure is 894-341 systolic  Patient without other complaints of shortness of breath  OBJECTIVE:  Current Weight: Weight - Scale: 106 kg (233 lb 11 oz)  Vitals:    07/10/22 1513 07/10/22 1941 07/10/22 2324 07/11/22 0742   BP: 126/68 143/68 145/73 139/66   Pulse: 66 66 68 70   Resp:  20 18    Temp: 98 °F (36 7 °C) 97 9 °F (36 6 °C) 98 °F (36 7 °C) (!) 97 3 °F (36 3 °C)   TempSrc:       SpO2: 92% 94% 93% 96%   Weight:       Height:         No intake or output data in the 24 hours ending 07/11/22 0905  General: NAD  Skin: no rash  Eyes: anicteric sclera  ENT: moist mucous membrane  Neck: supple  Chest: CTA b/l, no ronchii, no wheeze, no rubs, no rales  CVS: s1s2, no murmur, no gallop, no rub  Abdomen: soft, nontender, nl sounds  Extremities: no edema LE b/l, skin slight left groin without erythema  No swelling noted  No adenopathy palpated    : no gaitan  Neuro: AAOX3  Psych: normal affect    Medications:    Current Facility-Administered Medications:     acetaminophen (TYLENOL) tablet 650 mg, 650 mg, Oral, Q6H PRN, Cb Carey PA-C    allopurinol (ZYLOPRIM) tablet 100 mg, 100 mg, Oral, Daily, Brina Vecellio, PA-C, 100 mg at 07/11/22 0829    atorvastatin (LIPITOR) tablet 80 mg, 80 mg, Oral, HS, Brina Vecellio, PA-C, 80 mg at 07/10/22 2111    carvedilol (COREG) tablet 12 5 mg, 12 5 mg, Oral, BID, Brina Vecellio, PA-C, 12 5 mg at 07/11/22 0830    clopidogrel (PLAVIX) tablet 75 mg, 75 mg, Oral, Daily, Brina Vecellio, PA-C, 75 mg at 07/11/22 0830    heparin (porcine) subcutaneous injection 5,000 Units, 5,000 Units, Subcutaneous, Q8H [address], Brina Vecellio, PA-C, 5,000 Units at 07/11/22 0515    HYDROmorphone HCl (DILAUDID) injection 0 2 mg, 0 2 mg, Intravenous, Q4H PRN, Freddy Carrasquillo DO, 0 2 mg at 07/08/22 1637    insulin lispro (HumaLOG) 100 units/mL subcutaneous injection 1-6 Units, 1-6 Units, Subcutaneous, TID AC, 2 Units at 07/11/22 0830 **AND** Fingerstick Glucose (POCT), , , TID AC, Brina Vecellio, PA-C    insulin lispro (HumaLOG) 100 units/mL subcutaneous injection 1-6 Units, 1-6 Units, Subcutaneous, HS, Brina Vecellio, PA-C, 3 Units at 07/10/22 2111    NIFEdipine (PROCARDIA XL) 24 hr tablet 30 mg, 30 mg, Oral, BID, Brina Vecellio, PA-C, 30 mg at 07/11/22 0829    ondansetron (ZOFRAN) injection 4 mg, 4 mg, Intravenous, Q6H PRN, Cb Carey PA-C    oxyCODONE (ROXICODONE) immediate release tablet 10 mg, 10 mg, Oral, Q4H PRN, Freddy Carrasquillo DO, 10 mg at 07/11/22 0546    oxyCODONE (ROXICODONE) IR tablet 5 mg, 5 mg, Oral, Q4H PRN, Jay Simpson DO    pneumococcal 23-valent polysaccharide vaccine (PNEUMOVAX-23) injection 0 5 mL, 0 5 mL, Subcutaneous, Prior to discharge, Lore Silver DO    [COMPLETED] predniSONE tablet 60 mg, 60 mg, Oral, Daily, 60 mg at 07/09/22 0825 **FOLLOWED BY** [COMPLETED] predniSONE tablet 40 mg, 40 mg, Oral, Daily, 40 mg at 07/11/22 0829 **FOLLOWED BY** [START ON 7/12/2022] predniSONE tablet 20 mg, 20 mg, Oral, Daily **FOLLOWED BY** [START ON 7/14/2022] predniSONE tablet 10 mg, 10 mg, Oral, Daily, Freddy Carrasquillo DO    sevelamer (RENAGEL) tablet 800 mg, 800 mg, Oral, TID With Meals, Alli Paiz PA-C, 800 mg at 07/11/22 0829    Laboratory Results:  Results from last 7 days   Lab Units 07/10/22  0549 07/07/22  0437 07/06/22  1856   WBC Thousand/uL 10 19* 9 56 12 42*   HEMOGLOBIN g/dL 10 8* 9 8* 10 8*   HEMATOCRIT % 32 7* 30 7* 33 4*   PLATELETS Thousands/uL 281 236 248   POTASSIUM mmol/L 4 5 4 3 4 3   CHLORIDE mmol/L 94* 98* 100   CO2 mmol/L 27 25 25   BUN mg/dL 51* 41* 40*   CREATININE mg/dL 5 39* 6 92* 6 87*   CALCIUM mg/dL 8 3 8 5 8 8

## 2022-07-12 ENCOUNTER — APPOINTMENT (INPATIENT)
Dept: DIALYSIS | Facility: HOSPITAL | Age: 62
DRG: 553 | End: 2022-07-12
Attending: INTERNAL MEDICINE
Payer: MEDICARE

## 2022-07-12 PROBLEM — M25.552 LEFT HIP PAIN: Status: ACTIVE | Noted: 2022-07-06

## 2022-07-12 LAB
ANION GAP SERPL CALCULATED.3IONS-SCNC: 12 MMOL/L (ref 4–13)
BASOPHILS # BLD AUTO: 0.01 THOUSANDS/ΜL (ref 0–0.1)
BASOPHILS NFR BLD AUTO: 0 % (ref 0–1)
BUN SERPL-MCNC: 95 MG/DL (ref 5–25)
CALCIUM SERPL-MCNC: 7.9 MG/DL (ref 8.3–10.1)
CHLORIDE SERPL-SCNC: 98 MMOL/L (ref 100–108)
CO2 SERPL-SCNC: 25 MMOL/L (ref 21–32)
CREAT SERPL-MCNC: 6.82 MG/DL (ref 0.6–1.3)
EOSINOPHIL # BLD AUTO: 0.03 THOUSAND/ΜL (ref 0–0.61)
EOSINOPHIL NFR BLD AUTO: 0 % (ref 0–6)
ERYTHROCYTE [DISTWIDTH] IN BLOOD BY AUTOMATED COUNT: 15.2 % (ref 11.6–15.1)
GFR SERPL CREATININE-BSD FRML MDRD: 7 ML/MIN/1.73SQ M
GLUCOSE SERPL-MCNC: 116 MG/DL (ref 65–140)
GLUCOSE SERPL-MCNC: 120 MG/DL (ref 65–140)
GLUCOSE SERPL-MCNC: 137 MG/DL (ref 65–140)
GLUCOSE SERPL-MCNC: 189 MG/DL (ref 65–140)
GLUCOSE SERPL-MCNC: 244 MG/DL (ref 65–140)
HCT VFR BLD AUTO: 32.9 % (ref 36.5–49.3)
HGB BLD-MCNC: 11.2 G/DL (ref 12–17)
IMM GRANULOCYTES # BLD AUTO: 0.09 THOUSAND/UL (ref 0–0.2)
IMM GRANULOCYTES NFR BLD AUTO: 1 % (ref 0–2)
LYMPHOCYTES # BLD AUTO: 1.96 THOUSANDS/ΜL (ref 0.6–4.47)
LYMPHOCYTES NFR BLD AUTO: 23 % (ref 14–44)
MCH RBC QN AUTO: 29.9 PG (ref 26.8–34.3)
MCHC RBC AUTO-ENTMCNC: 34 G/DL (ref 31.4–37.4)
MCV RBC AUTO: 88 FL (ref 82–98)
MONOCYTES # BLD AUTO: 0.93 THOUSAND/ΜL (ref 0.17–1.22)
MONOCYTES NFR BLD AUTO: 11 % (ref 4–12)
NEUTROPHILS # BLD AUTO: 5.53 THOUSANDS/ΜL (ref 1.85–7.62)
NEUTS SEG NFR BLD AUTO: 65 % (ref 43–75)
NRBC BLD AUTO-RTO: 0 /100 WBCS
PLATELET # BLD AUTO: 281 THOUSANDS/UL (ref 149–390)
PMV BLD AUTO: 9.7 FL (ref 8.9–12.7)
POTASSIUM SERPL-SCNC: 5.1 MMOL/L (ref 3.5–5.3)
RBC # BLD AUTO: 3.74 MILLION/UL (ref 3.88–5.62)
SODIUM SERPL-SCNC: 135 MMOL/L (ref 136–145)
WBC # BLD AUTO: 8.55 THOUSAND/UL (ref 4.31–10.16)

## 2022-07-12 PROCEDURE — 97530 THERAPEUTIC ACTIVITIES: CPT

## 2022-07-12 PROCEDURE — 99239 HOSP IP/OBS DSCHRG MGMT >30: CPT | Performed by: FAMILY MEDICINE

## 2022-07-12 PROCEDURE — 85025 COMPLETE CBC W/AUTO DIFF WBC: CPT | Performed by: INTERNAL MEDICINE

## 2022-07-12 PROCEDURE — 80048 BASIC METABOLIC PNL TOTAL CA: CPT | Performed by: INTERNAL MEDICINE

## 2022-07-12 PROCEDURE — 82948 REAGENT STRIP/BLOOD GLUCOSE: CPT

## 2022-07-12 PROCEDURE — 90935 HEMODIALYSIS ONE EVALUATION: CPT | Performed by: INTERNAL MEDICINE

## 2022-07-12 RX ORDER — ACETAMINOPHEN 325 MG/1
975 TABLET ORAL EVERY 8 HOURS SCHEDULED
Status: DISCONTINUED | OUTPATIENT
Start: 2022-07-12 | End: 2022-07-12

## 2022-07-12 RX ORDER — ACETAMINOPHEN 325 MG/1
975 TABLET ORAL EVERY 8 HOURS SCHEDULED
Status: DISCONTINUED | OUTPATIENT
Start: 2022-07-12 | End: 2022-07-14 | Stop reason: HOSPADM

## 2022-07-12 RX ADMIN — NIFEDIPINE 30 MG: 30 TABLET, EXTENDED RELEASE ORAL at 21:15

## 2022-07-12 RX ADMIN — ATORVASTATIN CALCIUM 80 MG: 80 TABLET, FILM COATED ORAL at 21:23

## 2022-07-12 RX ADMIN — CLOPIDOGREL BISULFATE 75 MG: 75 TABLET ORAL at 12:20

## 2022-07-12 RX ADMIN — INSULIN LISPRO 3 UNITS: 100 INJECTION, SOLUTION INTRAVENOUS; SUBCUTANEOUS at 21:21

## 2022-07-12 RX ADMIN — OXYCODONE HYDROCHLORIDE 10 MG: 10 TABLET ORAL at 16:44

## 2022-07-12 RX ADMIN — HEPARIN SODIUM 5000 UNITS: 5000 INJECTION INTRAVENOUS; SUBCUTANEOUS at 15:20

## 2022-07-12 RX ADMIN — HEPARIN SODIUM 5000 UNITS: 5000 INJECTION INTRAVENOUS; SUBCUTANEOUS at 06:03

## 2022-07-12 RX ADMIN — NIFEDIPINE 30 MG: 30 TABLET, EXTENDED RELEASE ORAL at 12:19

## 2022-07-12 RX ADMIN — SEVELAMER HYDROCHLORIDE 800 MG: 800 TABLET, FILM COATED PARENTERAL at 08:27

## 2022-07-12 RX ADMIN — CARVEDILOL 12.5 MG: 12.5 TABLET, FILM COATED ORAL at 21:23

## 2022-07-12 RX ADMIN — ACETAMINOPHEN 975 MG: 325 TABLET ORAL at 21:21

## 2022-07-12 RX ADMIN — PREDNISONE 20 MG: 20 TABLET ORAL at 12:19

## 2022-07-12 RX ADMIN — INSULIN LISPRO 1 UNITS: 100 INJECTION, SOLUTION INTRAVENOUS; SUBCUTANEOUS at 16:43

## 2022-07-12 RX ADMIN — SEVELAMER HYDROCHLORIDE 800 MG: 800 TABLET, FILM COATED PARENTERAL at 12:20

## 2022-07-12 RX ADMIN — OXYCODONE HYDROCHLORIDE 10 MG: 10 TABLET ORAL at 12:17

## 2022-07-12 RX ADMIN — SEVELAMER HYDROCHLORIDE 800 MG: 800 TABLET, FILM COATED PARENTERAL at 16:43

## 2022-07-12 RX ADMIN — CARVEDILOL 12.5 MG: 12.5 TABLET, FILM COATED ORAL at 12:20

## 2022-07-12 RX ADMIN — HEPARIN SODIUM 5000 UNITS: 5000 INJECTION INTRAVENOUS; SUBCUTANEOUS at 21:21

## 2022-07-12 RX ADMIN — ALLOPURINOL 100 MG: 100 TABLET ORAL at 12:20

## 2022-07-12 NOTE — PLAN OF CARE
Problem: Potential for Falls  Goal: Patient will remain free of falls  Description: INTERVENTIONS:  - Educate patient/family on patient safety including physical limitations  - Instruct patient to call for assistance with activity   - Consult OT/PT to assist with strengthening/mobility   - Keep Call bell within reach  - Keep bed low and locked with side rails adjusted as appropriate  - Keep care items and personal belongings within reach  - Initiate and maintain comfort rounds  - Make Fall Risk Sign visible to staff  - Offer Toileting every 2 Hours, in advance of need  - Initiate/Maintain bed alarm  - Obtain necessary fall risk management equipment:   - Apply yellow socks and bracelet for high fall risk patients  - Consider moving patient to room near nurses station  Outcome: Progressing     Problem: MOBILITY - ADULT  Goal: Maintain or return to baseline ADL function  Description: INTERVENTIONS:  -  Assess patient's ability to carry out ADLs; assess patient's baseline for ADL function and identify physical deficits which impact ability to perform ADLs (bathing, care of mouth/teeth, toileting, grooming, dressing, etc )  - Assess/evaluate cause of self-care deficits   - Assess range of motion  - Assess patient's mobility; develop plan if impaired  - Assess patient's need for assistive devices and provide as appropriate  - Encourage maximum independence but intervene and supervise when necessary  - Involve family in performance of ADLs  - Assess for home care needs following discharge   - Consider OT consult to assist with ADL evaluation and planning for discharge  - Provide patient education as appropriate  Outcome: Progressing  Goal: Maintains/Returns to pre admission functional level  Description: INTERVENTIONS:  - Perform BMAT or MOVE assessment daily    - Set and communicate daily mobility goal to care team and patient/family/caregiver     - Collaborate with rehabilitation services on mobility goals if consulted  - Perform Range of Motion 2 times a day  - Record patient progress and toleration of activity level   Outcome: Progressing     Problem: Prexisting or High Potential for Compromised Skin Integrity  Goal: Skin integrity is maintained or improved  Description: INTERVENTIONS:  - Identify patients at risk for skin breakdown  - Assess and monitor skin integrity  - Assess and monitor nutrition and hydration status  - Monitor labs   - Assess for incontinence   - Turn and reposition patient  - Assist with mobility/ambulation  - Relieve pressure over bony prominences  - Avoid friction and shearing  - Provide appropriate hygiene as needed including keeping skin clean and dry  - Evaluate need for skin moisturizer/barrier cream  - Collaborate with interdisciplinary team   - Patient/family teaching  - Consider wound care consult   Outcome: Progressing     Problem: Nutrition/Hydration-ADULT  Goal: Nutrient/Hydration intake appropriate for improving, restoring or maintaining nutritional needs  Description: Monitor and assess patient's nutrition/hydration status for malnutrition  Collaborate with interdisciplinary team and initiate plan and interventions as ordered  Monitor patient's weight and dietary intake as ordered or per policy  Utilize nutrition screening tool and intervene as necessary  Determine patient's food preferences and provide high-protein, high-caloric foods as appropriate       INTERVENTIONS:  - Monitor oral intake, urinary output, labs, and treatment plans  - Assess nutrition and hydration status and recommend course of action  - Evaluate amount of meals eaten  - Assist patient with eating if necessary   - Allow adequate time for meals  - Recommend/ encourage appropriate diets, oral nutritional supplements, and vitamin/mineral supplements  - Order, calculate, and assess calorie counts as needed  - Recommend, monitor, and adjust tube feedings and TPN/PPN based on assessed needs  - Assess need for intravenous fluids  - Provide specific nutrition/hydration education as appropriate  - Include patient/family/caregiver in decisions related to nutrition  Outcome: Progressing     Problem: METABOLIC, FLUID AND ELECTROLYTES - ADULT  Goal: Electrolytes maintained within normal limits  Description: INTERVENTIONS:  - Monitor labs and assess patient for signs and symptoms of electrolyte imbalances  - Administer electrolyte replacement as ordered  - Monitor response to electrolyte replacements, including repeat lab results as appropriate  - Instruct patient on fluid and nutrition as appropriate  Outcome: Progressing  Goal: Fluid balance maintained  Description: INTERVENTIONS:  - Monitor labs   - Monitor I/O and WT  - Instruct patient on fluid and nutrition as appropriate  - Assess for signs & symptoms of volume excess or deficit  Outcome: Progressing

## 2022-07-12 NOTE — CASE MANAGEMENT
Case Management Progress Note    Patient name Dirk Shaikh  Location [address] [address] Erin Lafleur MRN [id number]  : 1960 Date 2022       LOS (days): 5  Geometric Mean LOS (GMLOS) (days): 4 20  Days to GMLOS:-0 9        OBJECTIVE:        Current admission status: Inpatient  Preferred Pharmacy:   [address] [place] [address]  Karsten Chapo [address], [address] [place]  Phone: [phone] Fax: [phone]    Primary Care Provider: Darek Pichardo MD    Primary Insurance: MEDICARE  Secondary Insurance:     PROGRESS NOTE:    Pt is appealing his discharge, [id number]  Pt was given notice of discharge  And CM tasked livanta appeal to CM dc support

## 2022-07-12 NOTE — QUICK NOTE
Patient continues to report left hip/groin pain  CT scan also negative for fracture but did show left hip and knee osteoarthritis  Findings discussed with patient  PT recommending home with home health rehab  Discussed with patient that will continue with pain management as needed at home and he can follow up with Orthopedics, PCP and if needed pain management  Discussed with patient he can get outpatient physical therapy as well if he cannot get home therapy  Patient states he cannot go home today as he does not have a ride however informed him that we will set up a lyft ride    Patient is medically stable for discharge

## 2022-07-12 NOTE — NURSING NOTE
Patient refusing bed alarm  Patient educated on fall risks and safety  Patient verbalized understanding  Pt agrees to use call bell and wait for assistance to get up

## 2022-07-12 NOTE — DISCHARGE SUMMARY
Discharge Summary - [facility] Internal Medicine    Patient Information: Harsh Haney 60 y o  male MRN: [id number]  Unit/Bed#: [address] [place] [address] Encounter: [id number]    Discharging Physician / Practitioner: Elizabeth Galloway DO  PCP: Sierra Swenson MD  Admission Date: 7/6/2022  Discharge Date: 07/12/22    Reason for Admission: Pain (In groin, increasing since this am - no fall)      Discharge Diagnoses:     Principal Problem:    Left inguinal pain  Active Problems:    ESRD (end stage renal disease) ([facility] )    Type 2 diabetes mellitus, without long-term current use of insulin ([place] [address] )    Hypertension    History of TIA (transient ischemic attack)    Left leg swelling  Resolved Problems:    * No resolved hospital problems  *        ESRD (end stage renal disease) ([place] [address] )  Assessment & Plan  · ESRD on HD TTS  · HD per renal while here    Results from last 7 days   Lab Units 07/12/22  0603 07/11/22  1021 07/10/22  0549 07/07/22  0437 07/06/22  1856   BUN mg/dL 95* 81* 51* 41* 40*   CREATININE mg/dL 6 82* 6 65* 5 39* 6 92* 6 87*   EGFR ml/min/1 73sq m 7 8 10 7 7       * Left hip pain  Assessment & Plan  Patient presented to the hospital with worsening left hip/inguinal pain, history of severe left hip osteoarthritis per patient  · Noted to have stable B/L lymph nodes on imaging  · Question of cystitis however UA negative   · PT recommending outpatient care, should see orthopedic for further evaluation  · Continue pain control as needed  · Concern for gout history, acute gout flare, trialed on prednisone taper which has somewhat helped  · CT scan negative for fracture but did show hip and knee arthritis on the left    Type 2 diabetes mellitus, without long-term current use of insulin [facility])  Assessment & Plan  Lab Results   Component Value Date    HGBA1C 6 6 (H) 07/07/2022     Recent Labs     07/12/22  0814 07/12/22  1128 07/12/22  1554 07/12/22  2119   POCGLU 137 116 189* 244*     · continue linagliptin on discharge    Continue sliding scale during hospitalization      Left leg swelling  Assessment & Plan  · Venous duplex negative for DVT  · Improved    History of TIA (transient ischemic attack)  Assessment & Plan  · Continue statin, clopidogrel     Hypertension  Assessment & Plan  · Continue nifedipine, carvedilol  · Blood pressures remain stable      Consultations During Hospital Stay:  IP CONSULT TO NEPHROLOGY  IP CONSULT TO ORTHOPEDIC SURGERY    Procedures Performed:     · none    Significant Findings:     · Refer to hospital course and above listed diagnosis related plan for details    Imaging while in hospital:    CT abdomen pelvis wo contrast    Result Date: 7/6/2022  Narrative: CT ABDOMEN AND PELVIS WITHOUT IV CONTRAST INDICATION:   LLQ abdominal pain left inguinal pain  COMPARISON:  1/23/2022  TECHNIQUE:  CT examination of the abdomen and pelvis was performed without intravenous contrast  This examination was performed without intravenous contrast in the context of the critical nationwide Omnipaque shortage  Axial, sagittal, and coronal 2D reformatted images were created from the source data and submitted for interpretation  Radiation dose length product (DLP) for this visit:  994 39 mGy-cm   This examination, like all CT scans performed in the [facility], was performed utilizing techniques to minimize radiation dose exposure, including the use of iterative  reconstruction and automated exposure control  Enteric contrast was not administered  FINDINGS: ABDOMEN LOWER CHEST:  No clinically significant abnormality identified in the visualized lower chest  LIVER/BILIARY TREE:  Unremarkable  GALLBLADDER:  No calcified gallstones  No pericholecystic inflammatory change  SPLEEN:  Unremarkable  PANCREAS:  Unremarkable  ADRENAL GLANDS:  Unremarkable  KIDNEYS/URETERS:  There are small probable bilateral renal cysts some of which appear hyperdense as before  These can be further evaluated with ultrasound    No CT evidence of nephrolithiasis or hydronephrosis  No hydroureter  STOMACH AND BOWEL:  There is no bowel obstruction  There is mild colonic diverticulosis without acute diverticulitis  No focal inflammatory process  APPENDIX:  A normal appendix was visualized  ABDOMINOPELVIC CAVITY:  No ascites  No pneumoperitoneum  No lymphadenopathy  Small fat-containing retroperitoneal densities possibly small fat-containing lymph nodes are stable and unchanged  Stable small bilateral groin lymph nodes  VESSELS:  The abdominal is calcified but normal in caliber  No retroperitoneal hematoma  PELVIS REPRODUCTIVE ORGANS:  The prostate is enlarged  URINARY BLADDER:  Minimal bladder wall thickening  Underlying cystitis not excluded  ABDOMINAL WALL/INGUINAL REGIONS:  Unremarkable  OSSEOUS STRUCTURES:  Healing fracture of the T10 vertebral body noted  There is moderate to advanced left hip and moderate right hip osteoarthrosis  Impression: No bowel obstruction  Colonic diverticulosis without acute diverticulitis  Healing T10 vertebral body fracture  Minimal bladder wall thickening  Underlying cystitis not excluded  Additional findings as above  Workstation performed: [id number]     XR hip/pelv 2-3 vws left if performed    Result Date: 7/11/2022  Narrative: LEFT HIP INDICATION:   Increasing left hip pain  No reported trauma  COMPARISON:  None VIEWS:  XR HIP/PELV 2-3 VWS LEFT  W PELVIS IF PERFORMED FINDINGS: Bones are intact  Preserved alignment  No suspicious lytic or blastic bone lesion  Left greater than right hip osteoarthritis  Spine degenerative change  Soft tissue vascular calcification  Impression: No acute osseous abnormality  Degenerative changes  Workstation performed: [id number]     VAS lower limb venous duplex study, complete bilateral    Result Date: 7/8/2022  Narrative:  [facility] REPORT CLINICAL: Indications: Patient presents with left lower extremity pain     CONCLUSION: Impression: RIGHT LOWER LIMB: No evidence of acute or chronic deep vein thrombosis  No evidence of superficial thrombophlebitis noted  Doppler evaluation shows a normal response to augmentation maneuvers  Popliteal, posterior tibial and anterior tibial arterial Doppler waveforms are triphasic  LEFT LOWER LIMB: No evidence of acute or chronic deep vein thrombosis  No evidence of superficial thrombophlebitis noted  Doppler evaluation shows a normal response to augmentation maneuvers  Popliteal, posterior tibial and anterior tibial arterial Doppler waveforms are triphasic  Tech Note: There is an echogenic structure located in the inguinal region  This structure measures approximately 1 75cm x 3 cm and is consistent with enlarged lymph node and channels  Technically difficult/limited study secondary to inability to reposition  SIGNATURE: Electronically Signed by: Ashley Hughes MD, [address] on 2022-07-08 09:53:58 AM    CT lower extremity wo contrast left    Result Date: 7/11/2022  Narrative: CT left hip and femur  without IV contrast INDICATION: pain  Per ED notes, presented with worsening left hip/inguinal pain, severe left hip osteoarthritis per patient  No reported trauma  COMPARISON: 7/6/2022 abdominopelvic CT TECHNIQUE: CT examination of the above was performed  This examination was performed without intravenous contrast in the context of the critical nationwide Omnipaque shortage  This examination, like all CT scans performed in the [facility], was performed utilizing techniques to minimize radiation dose exposure, including the use of iterative reconstruction and automated exposure control software  Sagittal and coronal two dimensional reconstructed images were also submitted for interpretation  Rad dose  965 mGy-cm FINDINGS: OSSEOUS STRUCTURES:  Intact  Preserved alignment  Moderate to marked left hip and knee osteophytes  Superior left hip joint space narrowing  Small knee suprapatella effusion   No suspicious bone lesions  VISUALIZED MUSCULATURE:  No acute findings  Mild muscle fatty infiltration, predominantly gluteus, tensor fasciae latae and hamstrings, particularly semimembranosus  SOFT TISSUES:  Vascular calcification  No acute findings  OTHER PERTINENT FINDINGS:  None  Impression: No acute findings  Left hip and knee osteoarthritis  Workstation performed: [id number]       Incidental Findings:   · none    Test Results Pending at Discharge (will require follow up):   · As per After Visit Summary     Outpatient Tests Requested:  · none    Complications:  Refer to hospital course and above listed diagnosis related plan, if any    Hospital Course:     Clay Robertson is a 60 y o  male patient who originally presented to the hospital on 7/6/2022 due to Left hip/groin pain when he woke up that progressively worsened  No falls or trauma to the area  Pain worse with movement  Patient was admitted and seen by Orthopedics who recommended no further intervention  Patient was also seen by Nephrology and underwent dialysis per Nephrology  patient started on pain medications p r n  per physical therapy, patient cleared for discharge home  patient is medically stable at this time  Initially stated that he could not go home as he did not have a ride  Discussed with patient that we would set up a lyft ride for him  Later patient appealed his discharge as he does not feel comfortable going home and wants to stay a few more days    Please see above list of diagnoses and related plan for additional information  Condition at Discharge: stable     Discharge Day Visit / Exam:     Subjective:  Continues to report left hip pain    States he can not go home today as he does not have a ride    Vitals: Blood Pressure: 157/73 (07/12/22 1219)  Pulse: 69 (07/12/22 1219)  Temperature: 97 9 °F (36 6 °C) (07/12/22 1143)  Temp Source: Oral (07/12/22 1143)  Respirations: 18 (07/12/22 1130)  Height: 6' 1" (185 4 cm) (07/06/22 2244)  Weight - Scale: 106 kg (233 lb 11 oz) (07/06/22 2244)  SpO2: 94 % (07/12/22 1219)  Exam:   Physical Exam  Constitutional:       General: He is not in acute distress  Appearance: He is not diaphoretic  HENT:      Head: Normocephalic and atraumatic  Eyes:      General:         Right eye: No discharge  Left eye: No discharge  Cardiovascular:      Rate and Rhythm: Normal rate and regular rhythm  Pulmonary:      Effort: Pulmonary effort is normal  No respiratory distress  Breath sounds: Normal breath sounds  No wheezing or rales  Abdominal:      General: Bowel sounds are normal  There is no distension  Palpations: Abdomen is soft  Tenderness: There is no abdominal tenderness  Neurological:      Mental Status: He is alert and oriented to person, place, and time  Psychiatric:         Mood and Affect: Mood is anxious  Discharge instructions/Information to patient and family:(Discharge Medications and Follow up):   See after visit summary for information provided to patient and family  Provisions for Follow-Up Care:  See after visit summary for information related to follow-up care and any pertinent home health orders  Disposition: Home with [facility]    Planned Readmission:  No     Discharge Statement:  I spent > 30 minutes discharging the patient  This time was spent on the day of discharge  I had direct contact with the patient on the day of discharge  Greater than 50% of the total time was spent examining patient, answering all patient questions, arranging and discussing plan of care with patient as well as directly providing post-discharge instructions  Additional time then spent on discharge activities  Discharge Medications:  See after visit summary for reconciled discharge medications provided to patient and family  ** Please Note: "This note has been constructed using a voice recognition system  Therefore there may be syntax, spelling, and/or grammatical errors  Please call if you have any questions  "**

## 2022-07-12 NOTE — PHYSICAL THERAPY NOTE
PT TREATMENT     07/12/22 3098   Note Type   Note Type Treatment   Pain Assessment   Pain Assessment Tool Farr-Baker FACES   Farr-Baker FACES Pain Rating 6   Pain Location/Orientation   (L hip with activity)   Restrictions/Precautions   Other Precautions Pain   General   Chart Reviewed Yes   Cognition   Overall Cognitive Status WFL   Subjective   Subjective "I'm not ready to go home, I don't want to fall"   Bed Mobility   Supine to Sit 7  Independent   Sit to Supine 7  Independent   Transfers   Sit to Stand 5  Supervision   Stand to Sit 5  Supervision   Stand pivot 5  Supervision   Additional items   (with walker)   Ambulation/Elevation   Gait pattern   (flexed posture, steady, mildly antalgic L)   Gait Assistance 5  Supervision   Assistive Device Rolling walker   Distance 2x125 feet   Balance   Static Sitting Fair +   Dynamic Sitting Fair   Static Standing Fair +   Dynamic Standing Fair   Activity Tolerance   Activity Tolerance Patient tolerated treatment well   Assessment   Prognosis Good   Problem List Decreased strength; Impaired balance;Decreased mobility;Pain   Assessment Patient demonstrates improving activity tolerance despite persistent but improving left groin pain  Patient was able to ambulate in the hallway with supervision with a rolling walker and negotiate 4 steps with 2 rails  Encouraged pt to ambulate in the hallway with the walker ad vahe  The patient's AM-[facility] Basic Mobility Inpatient Short Form Raw Score is 23  A Raw score of greater than 16 suggests the patient may benefit from discharge to home  Plan   Treatment/Interventions Functional transfer training;LE strengthening/ROM; Elevations; Therapeutic exercise;Gait training;Bed mobility; Equipment eval/education;Patient/family training   Recommendation   PT Discharge Recommendation Home with home health rehabilitation  (vs OP PT if home health not available)   Equipment Recommended   (pt has a walker, may issue a cane for the steps or future use if needed)   AM-PAC Basic Mobility Inpatient   Turning in Bed Without Bedrails 4   Lying on Back to Sitting on Edge of Flat Bed 4   Moving Bed to Chair 4   Standing Up From Chair 4   Walk in Room 4   Climb 3-5 Stairs 3   Basic Mobility Inpatient Raw Score 23   Basic Mobility Standardized Score 50 88   Highest Level Of Mobility   JH-HLM Goal 7: Walk 25 feet or more   JH-HLM Achieved 7: Walk 25 feet or more   Licensure   [place] License Number  Dwight LINDQUIST [id number]

## 2022-07-12 NOTE — CASE MANAGEMENT
Case Management Discharge Planning Note    Patient name Lin Marquez  Location 18 [facility] [address] 0 MRN [id number]  : 1960 Date 2022       Current Admission Date: 2022  Current Admission Diagnosis:Left inguinal pain   Patient Active Problem List    Diagnosis Date Noted    Left inguinal pain 2022    Left leg swelling 2022    Hemodialysis status ([facility] )     Hypervolemia     Anemia 2022    [facility] 2022    Type 2 diabetes mellitus, without long-term current use of insulin ([facility] )     Hypertension     History of TIA (transient ischemic attack)     T10 vertebral fracture (HCC)       LOS (days): 5  Geometric Mean LOS (GMLOS) (days): 4 20  Days to GMLOS:-0 7     OBJECTIVE:  Risk of Unplanned Readmission Score: 25 7         Current admission status: Inpatient   Preferred Pharmacy:   [address] [place] [address]  Phone: [phone] Fax: [phone]    Primary Care Provider: Facundo Cruz MD    Primary Insurance: MEDICARE  Secondary Insurance:     DISCHARGE DETAILS:    Discharge planning discussed with[de-identified] patient  Freedom of Choice: Yes  Comments - Freedom of Choice: no accepting [facility] due to pt not seeing pcp in over a year  he has no signing pcp for orders       Were Treatment Team discharge recommendations reviewed with patient/caregiver?: Yes  Did patient/caregiver verbalize understanding of patient care needs?: Yes  Were patient/caregiver advised of the risks associated with not following Treatment Team discharge recommendations?: Yes    Would you like to participate in our [address] service program?  : No - Declined    Treatment [facility]  Discharge Destination Plan[de-identified] Home        IMM Given (Date):: 22 (reviewed with pt, pt agreeable to plan, imm placed in scan bin)  IMM Given to[de-identified] Patient     Discharge plan is home with no services  He verbalized understanding of not being able to have hhc at this time due to pts lack of pcp  CM educated pt on he can still get hhc if he goes to see a pcp and the pcp office can help to set him up with hhc

## 2022-07-12 NOTE — PLAN OF CARE
Serum potassium-5 1 on 2 K bath  Pre wt-104 4 kg  Will attempt for UF-500-1000 ml as tolerated and discussed with Dr Lakeshia Shen  Patient was aware  Problem: METABOLIC, FLUID AND ELECTROLYTES - ADULT  Goal: Electrolytes maintained within normal limits  Description: INTERVENTIONS:  - Monitor labs and assess patient for signs and symptoms of electrolyte imbalances  - Administer electrolyte replacement as ordered  - Monitor response to electrolyte replacements, including repeat lab results as appropriate  - Instruct patient on fluid and nutrition as appropriate  Outcome: Progressing  Goal: Fluid balance maintained  Description: INTERVENTIONS:  - Monitor labs   - Monitor I/O and WT  - Instruct patient on fluid and nutrition as appropriate  - Assess for signs & symptoms of volume excess or deficit  Outcome: Progressing   Post-Dialysis RN Treatment Note    Blood Pressure:  Pre 150/66 mm/Hg  Post 167/75 mmHg   EDW  104 kg    Weight:  Pre 104 4 kg   Post 103 4 kg   Mode of weight measurement: Standing Scale   Volume Removed  1000 ml    Treatment duration 180 minutes    NS given  No    Treatment shortened?  No   Medications given during Rx None Reported   Estimated Kt/V  None Reported   Access type: Permacath/TDC   Access Issues: No    Report called to primary nurse   Yes       Shelby Tam RN

## 2022-07-12 NOTE — PROGRESS NOTES
[facility] [facility] PROGRESS NOTE   Will Prude 60 y o  male MRN: [id number]  Unit/Bed#: 2 [place] [address] Encounter: [id number]  Reason for Consult: ESRD    ASSESSMENT and PLAN:    0 Carol Morgan - 61 yo male with PMHx of ESRD, CAD, HTN, TIA, DM, p/w L groin pain  Nephrology on board for ESRD     1) ESRD     - HD TTS [address]  - dialysis dependent since 2/2022  - has some residual renal function  - Access - R IJ Perm cath (no drainage, no erythema)     Plan:     - continue dialysis TTS schedule  -dialysis today-patient seen earlier today and while on dialysis  Sodium level 138, bicarbonate 35, dialyzer F 160, 350 blood flow rate, 3 hour treatment, estimated dry weight 104 kg  Patient is currently below dry weight  Attempt to ultrafiltrate 0 5-1 L today     2) L groin pain     - per primary team  - Xray hip -  - duplex neg 7/7 but tech diff study  - Echogenic structure in L inguinal region consistent with enlarged lymph node  - CT scan - inguinal region unremarkable  - will need outpt f/u to ensure resolution of lymph node enlargement  -further plans per primary team  -CT scan on 07/11 with no acute findings, left hip anemia osteoarthritis     3) anemia     - stable Hb above goal     4) MBD     - cont phos binder     5) electrolytes-stable sodium  Potassium above goal slightly  Dialysis today     6) Acid/base-bicarbonate stable     7) HTN     - initially with accelerated HTN  Now normotensive       SUBJECTIVE / 24H INTERVAL HISTORY:    Blood pressure is 428-004 systolic  Afebrile  On room air  Continues to have left groin pain  No shortness of breath      OBJECTIVE:  Current Weight: Weight - Scale: 106 kg (233 lb 11 oz)  Vitals:    07/11/22 1728 07/11/22 1932 07/11/22 2359 07/12/22 0741   BP: 146/64 129/61 146/74 152/65   BP Location:  Right arm  Right arm   Pulse: 65 67 64 65   Resp:  16 18 18   Temp:  98 2 °F (36 8 °C)  97 6 °F (36 4 °C)   TempSrc:  Oral  Oral   SpO2: 95% 94% 93% 96% Weight:       Height:           Intake/Output Summary (Last 24 hours) at 7/12/2022 0850  Last data filed at 7/11/2022 1701  Gross per 24 hour   Intake --   Output 50 ml   Net -50 ml     General: NAD  Skin: no rash  Eyes: anicteric sclera  ENT: moist mucous membrane  Neck: supple  Chest: CTA b/l, no ronchii, no wheeze, no rubs, no rales  CVS: s1s2, no murmur, no gallop, no rub  Abdomen: soft, nontender, nl sounds  Extremities: no sig pitting edema LE b/l  : no gaitan  Neuro: AAOX3  Psych: normal affect    Medications:    Current Facility-Administered Medications:     acetaminophen (TYLENOL) tablet 650 mg, 650 mg, Oral, Q6H PRN, Saul Jolly PA-C    allopurinol (ZYLOPRIM) tablet 100 mg, 100 mg, Oral, Daily, Brina Vecellio, PA-C, 100 mg at 07/11/22 0829    atorvastatin (LIPITOR) tablet 80 mg, 80 mg, Oral, HS, Brina Vecellio, PA-C, 80 mg at 07/11/22 2131    carvedilol (COREG) tablet 12 5 mg, 12 5 mg, Oral, BID, Brina Vecellio, PA-C, 12 5 mg at 07/11/22 1727    clopidogrel (PLAVIX) tablet 75 mg, 75 mg, Oral, Daily, Brina Vecellio, PA-C, 75 mg at 07/11/22 0830    heparin (porcine) subcutaneous injection 5,000 Units, 5,000 Units, Subcutaneous, Q8H [facility], Brina Vecellio, PA-C, 5,000 Units at 07/12/22 0603    HYDROmorphone HCl (DILAUDID) injection 0 2 mg, 0 2 mg, Intravenous, Q4H PRN, Freddy Carrasquillo DO, 0 2 mg at 07/08/22 1637    insulin lispro (HumaLOG) 100 units/mL subcutaneous injection 1-6 Units, 1-6 Units, Subcutaneous, TID AC, 3 Units at 07/11/22 1724 **AND** Fingerstick Glucose (POCT), , , TID AC, Brina Meenao, PA-C    insulin lispro (HumaLOG) 100 units/mL subcutaneous injection 1-6 Units, 1-6 Units, Subcutaneous, HS, Brina Milton PA-C, 2 Units at 07/11/22 2133    NIFEdipine (PROCARDIA XL) 24 hr tablet 30 mg, 30 mg, Oral, BID, Brina Milton PA-C, 30 mg at 07/11/22 1726    ondansetron (ZOFRAN) injection 4 mg, 4 mg, Intravenous, Q6H PRN, Saul Jolly PA-C    oxyCODONAUGUSTINE (ROXICODONE) immediate release tablet 10 mg, 10 mg, Oral, Q4H PRN, Freddy Carrasquillo, , 10 mg at 07/11/22 2149    oxyCODONE (ROXICODONE) IR tablet 5 mg, 5 mg, Oral, Q4H PRN, Shazia Hutton DO    pneumococcal 23-valent polysaccharide vaccine (PNEUMOVAX-23) injection 0 5 mL, 0 5 mL, Subcutaneous, Prior to discharge, Lore Silver DO    [COMPLETED] predniSONE tablet 60 mg, 60 mg, Oral, Daily, 60 mg at 07/09/22 0825 **FOLLOWED BY** [COMPLETED] predniSONE tablet 40 mg, 40 mg, Oral, Daily, 40 mg at 07/11/22 0829 **FOLLOWED BY** predniSONE tablet 20 mg, 20 mg, Oral, Daily **FOLLOWED BY** [START ON 7/14/2022] predniSONE tablet 10 mg, 10 mg, Oral, Daily, Freddy Carrasquillo DO    sevelamer (RENAGEL) tablet 800 mg, 800 mg, Oral, TID With Meals, Slade Lubin PA-C, 800 mg at 07/12/22 0827    Laboratory Results:  Results from last 7 days   Lab Units 07/12/22  0603 07/11/22  1021 07/10/22  0549 07/07/22  0437 07/06/22  1856   WBC Thousand/uL 8 55 9 88 10 19* 9 56 12 42*   HEMOGLOBIN g/dL 11 2* 10 8* 10 8* 9 8* 10 8*   HEMATOCRIT % 32 9* 32 7* 32 7* 30 7* 33 4*   PLATELETS Thousands/uL 281 285 281 236 248   POTASSIUM mmol/L 5 1 4 8 4 5 4 3 4 3   CHLORIDE mmol/L 98* 96* 94* 98* 100   CO2 mmol/L 25 26 27 25 25   BUN mg/dL 95* 81* 51* 41* 40*   CREATININE mg/dL 6 82* 6 65* 5 39* 6 92* 6 87*   CALCIUM mg/dL 7 9* 7 8* 8 3 8 5 8 8

## 2022-07-13 PROBLEM — M79.89 LEFT LEG SWELLING: Status: RESOLVED | Noted: 2022-07-06 | Resolved: 2022-07-13

## 2022-07-13 LAB
GLUCOSE SERPL-MCNC: 112 MG/DL (ref 65–140)
GLUCOSE SERPL-MCNC: 147 MG/DL (ref 65–140)
GLUCOSE SERPL-MCNC: 162 MG/DL (ref 65–140)
GLUCOSE SERPL-MCNC: 231 MG/DL (ref 65–140)

## 2022-07-13 PROCEDURE — 82948 REAGENT STRIP/BLOOD GLUCOSE: CPT

## 2022-07-13 PROCEDURE — 97116 GAIT TRAINING THERAPY: CPT

## 2022-07-13 PROCEDURE — 99232 SBSQ HOSP IP/OBS MODERATE 35: CPT | Performed by: FAMILY MEDICINE

## 2022-07-13 PROCEDURE — 99232 SBSQ HOSP IP/OBS MODERATE 35: CPT | Performed by: INTERNAL MEDICINE

## 2022-07-13 RX ADMIN — CARVEDILOL 12.5 MG: 12.5 TABLET, FILM COATED ORAL at 09:31

## 2022-07-13 RX ADMIN — OXYCODONE HYDROCHLORIDE 10 MG: 10 TABLET ORAL at 20:05

## 2022-07-13 RX ADMIN — NIFEDIPINE 30 MG: 30 TABLET, EXTENDED RELEASE ORAL at 20:06

## 2022-07-13 RX ADMIN — CLOPIDOGREL BISULFATE 75 MG: 75 TABLET ORAL at 09:32

## 2022-07-13 RX ADMIN — ACETAMINOPHEN 975 MG: 325 TABLET ORAL at 21:31

## 2022-07-13 RX ADMIN — ATORVASTATIN CALCIUM 80 MG: 80 TABLET, FILM COATED ORAL at 21:31

## 2022-07-13 RX ADMIN — ACETAMINOPHEN 975 MG: 325 TABLET ORAL at 14:45

## 2022-07-13 RX ADMIN — NIFEDIPINE 30 MG: 30 TABLET, EXTENDED RELEASE ORAL at 09:30

## 2022-07-13 RX ADMIN — CARVEDILOL 12.5 MG: 12.5 TABLET, FILM COATED ORAL at 20:06

## 2022-07-13 RX ADMIN — HEPARIN SODIUM 5000 UNITS: 5000 INJECTION INTRAVENOUS; SUBCUTANEOUS at 14:45

## 2022-07-13 RX ADMIN — INSULIN LISPRO 1 UNITS: 100 INJECTION, SOLUTION INTRAVENOUS; SUBCUTANEOUS at 21:33

## 2022-07-13 RX ADMIN — HEPARIN SODIUM 5000 UNITS: 5000 INJECTION INTRAVENOUS; SUBCUTANEOUS at 21:31

## 2022-07-13 RX ADMIN — INSULIN LISPRO 3 UNITS: 100 INJECTION, SOLUTION INTRAVENOUS; SUBCUTANEOUS at 16:53

## 2022-07-13 RX ADMIN — SEVELAMER HYDROCHLORIDE 800 MG: 800 TABLET, FILM COATED PARENTERAL at 09:30

## 2022-07-13 RX ADMIN — ACETAMINOPHEN 975 MG: 325 TABLET ORAL at 05:41

## 2022-07-13 RX ADMIN — SEVELAMER HYDROCHLORIDE 800 MG: 800 TABLET, FILM COATED PARENTERAL at 12:46

## 2022-07-13 RX ADMIN — HEPARIN SODIUM 5000 UNITS: 5000 INJECTION INTRAVENOUS; SUBCUTANEOUS at 05:41

## 2022-07-13 RX ADMIN — PREDNISONE 20 MG: 20 TABLET ORAL at 09:31

## 2022-07-13 RX ADMIN — ALLOPURINOL 100 MG: 100 TABLET ORAL at 09:31

## 2022-07-13 NOTE — CASE MANAGEMENT
Case Management Progress Note    Patient name Guzman Egan  Location 18 [facility] 221[address] Erin Lafleur MRN [id number]  : 1960 Date 2022       LOS (days): 6  Geometric Mean LOS (GMLOS) (days): 4 20  Days to GMLOS:-1 9        OBJECTIVE:        Current admission status: Inpatient  Preferred Pharmacy:   [address] [place] [address]  [address], [facility] [address]  Phone: [phone] Fax: [phone]    Primary Care Provider: Peter Vila MD    Primary Insurance: MEDICARE  Secondary Insurance:     PROGRESS NOTE:   CM spoke with pt bedside  [place] is still processing, but in financial determination status  Pt is agreeable for dc tomorrow morning and will need lyft home  Cm to set up lyft home first thing in the morning, then pt to get himself to dialysis and agrees to speak with doritaita to let them know of his arrival for dialysis tomorrow

## 2022-07-13 NOTE — PHYSICAL THERAPY NOTE
PT TREATMENT     07/13/22 8595   Note Type   Note Type Treatment   Pain Assessment   Pain Assessment Tool 0-10   Pain Score 4  (L hip)   Restrictions/Precautions   Other Precautions Pain   General   Chart Reviewed Yes   Cognition   Overall Cognitive Status WFL   Subjective   Subjective "doing better everyday"   Bed Mobility   Supine to Sit 7  Independent   Sit to Supine 7  Independent   Transfers   Sit to Stand 7  Independent   Stand to Sit 7  Independent   Stand pivot 7  Independent   Ambulation/Elevation   Gait pattern   (flexed posture)   Gait Assistance 5  Supervision   Assistive Device   (with and without walker)   Distance 75 feet without device, 150 feet with walker   Stair Management Assistance 5  Supervision   Stair Management Technique Two rails; One rail L   Number of Stairs 4+4   Balance   Static Sitting Fair +   Dynamic Sitting Fair +   Static Standing Fair +   Dynamic Standing Fair   Activity Tolerance   Activity Tolerance Patient tolerated treatment well;Patient limited by pain; Patient limited by fatigue   Assessment   Prognosis Good   Problem List Decreased strength;Decreased endurance; Impaired balance;Decreased mobility;Pain   Assessment Pt demonstrates good progress toward all goals  Pt demonstrates improving pain and function as pt is able to ambulate on level surfaces with a steady gait with a walker  Pt was also able to walk without a device for the first time with a generally steady gait without increased pain  Pt encouraged to ambulate again this afternoon with staff  The patient's AM-PAC Basic Mobility Inpatient Short Form Raw Score is 23  A Raw score of greater than 16 suggests the patient may benefit from discharge to home  Plan   Treatment/Interventions LE strengthening/ROM; Elevations; Therapeutic exercise;Gait training   Progress Progressing toward goals   Recommendation   PT Discharge Recommendation Home with home health rehabilitation   Equipment Recommended   (pt has a walker, declines a cane)   AM-PAC Basic Mobility Inpatient   Turning in Bed Without Bedrails 4   Lying on Back to Sitting on Edge of Flat Bed 4   Moving Bed to Chair 4   Standing Up From Chair 4   Walk in Room 4   Climb 3-5 Stairs 4   Basic Mobility Inpatient Raw Score 24   Basic Mobility Standardized Score 57 68   Highest Level Of Mobility   JH-HLM Goal 8: Walk 250 feet or more   JH-HLM Achieved 8: Walk 430 feet ot more   Licensure   [place] License Number  Dwight LINDQUIST [id number]

## 2022-07-13 NOTE — ASSESSMENT & PLAN NOTE
· ESRD on HD TTS  · Received HD per renal while here  · Patient was offered extra chair time tomorrow however he states that he will wait till his next scheduled dialysis on Saturday    Results from last 7 days   Lab Units 07/12/22  0603 07/11/22  1021 07/10/22  0549 07/07/22  0437 07/06/22  1856   BUN mg/dL 95* 81* 51* 41* 40*   CREATININE mg/dL 6 82* 6 65* 5 39* 6 92* 6 87*   EGFR ml/min/1 73sq m 7 8 10 7 7

## 2022-07-13 NOTE — ASSESSMENT & PLAN NOTE
Lab Results   Component Value Date    HGBA1C 6 6 (H) 07/07/2022     Recent Labs     07/13/22  1056 07/13/22  1606 07/13/22 2054 07/14/22  0720   POCGLU 147* 231* 162* 128     · Continue linagliptin on discharge which was held here   On sliding scale during hospitalization

## 2022-07-13 NOTE — ASSESSMENT & PLAN NOTE
Patient presented to the hospital with worsening left inguinal/ hip pain  history of severe left hip osteoarthritis per patient    · Noted to have B/L lymph nodes on imaging which is stable  · Question of cystitis however UA negative   · Patient seen by orthopedic initially and recommended to follow-up after discharge  · Continue pain medications as needed  · Concern for gout history, acute gout flare, trialed on prednisone taper which has helped somewhat  · CT scan negative for fracture but did show hip and knee arthritis on the left

## 2022-07-13 NOTE — CASE MANAGEMENT
Case Management Progress Note    Patient name Maria Luz Anderson  Location 18 [place] [address] Erin Lafleur MRN [id number]  : 1960 Date 2022       LOS (days): 6  Geometric Mean LOS (GMLOS) (days): 4 20  Days to GMLOS:-1 9        OBJECTIVE:        Current admission status: Inpatient  Preferred Pharmacy:   [address] [place], [place] [address] [address], [place] [address]  Phone: [phone] Fax: [phone]    Primary Care Provider: Denisa Corado MD    Primary Insurance: MEDICARE  Secondary Insurance:     PROGRESS NOTE:    CM delivered HNN to pt, pt would be responsible for services starting on 2022  Cost of daily rate for hospital stay is $2839    Pt verbalized understanding

## 2022-07-13 NOTE — PLAN OF CARE
Problem: Potential for Falls  Goal: Patient will remain free of falls  Description: INTERVENTIONS:  - Educate patient/family on patient safety including physical limitations  - Instruct patient to call for assistance with activity   - Consult OT/PT to assist with strengthening/mobility   - Keep Call bell within reach  - Keep bed low and locked with side rails adjusted as appropriate  - Keep care items and personal belongings within reach  - Initiate and maintain comfort rounds  - Make Fall Risk Sign visible to staff  - Offer Toileting every 2 Hours, in advance of need  - Initiate/Maintain bed alarm  - Obtain necessary fall risk management equipment:   - Apply yellow socks and bracelet for high fall risk patients  - Consider moving patient to room near nurses station  Outcome: Progressing     Problem: MOBILITY - ADULT  Goal: Maintain or return to baseline ADL function  Description: INTERVENTIONS:  -  Assess patient's ability to carry out ADLs; assess patient's baseline for ADL function and identify physical deficits which impact ability to perform ADLs (bathing, care of mouth/teeth, toileting, grooming, dressing, etc )  - Assess/evaluate cause of self-care deficits   - Assess range of motion  - Assess patient's mobility; develop plan if impaired  - Assess patient's need for assistive devices and provide as appropriate  - Encourage maximum independence but intervene and supervise when necessary  - Involve family in performance of ADLs  - Assess for home care needs following discharge   - Consider OT consult to assist with ADL evaluation and planning for discharge  - Provide patient education as appropriate  Outcome: Progressing  Goal: Maintains/Returns to pre admission functional level  Description: INTERVENTIONS:  - Perform BMAT or MOVE assessment daily    - Set and communicate daily mobility goal to care team and patient/family/caregiver     - Collaborate with rehabilitation services on mobility goals if consulted  - Out of bed for toileting  - Record patient progress and toleration of activity level   Outcome: Progressing     Problem: Prexisting or High Potential for Compromised Skin Integrity  Goal: Skin integrity is maintained or improved  Description: INTERVENTIONS:  - Identify patients at risk for skin breakdown  - Assess and monitor skin integrity  - Assess and monitor nutrition and hydration status  - Monitor labs   - Assess for incontinence   - Turn and reposition patient  - Assist with mobility/ambulation  - Relieve pressure over bony prominences  - Avoid friction and shearing  - Provide appropriate hygiene as needed including keeping skin clean and dry  - Evaluate need for skin moisturizer/barrier cream  - Collaborate with interdisciplinary team   - Patient/family teaching  - Consider wound care consult   Outcome: Progressing     Problem: Nutrition/Hydration-ADULT  Goal: Nutrient/Hydration intake appropriate for improving, restoring or maintaining nutritional needs  Description: Monitor and assess patient's nutrition/hydration status for malnutrition  Collaborate with interdisciplinary team and initiate plan and interventions as ordered  Monitor patient's weight and dietary intake as ordered or per policy  Utilize nutrition screening tool and intervene as necessary  Determine patient's food preferences and provide high-protein, high-caloric foods as appropriate       INTERVENTIONS:  - Monitor oral intake, urinary output, labs, and treatment plans  - Assess nutrition and hydration status and recommend course of action  - Evaluate amount of meals eaten  - Assist patient with eating if necessary   - Allow adequate time for meals  - Recommend/ encourage appropriate diets, oral nutritional supplements, and vitamin/mineral supplements  - Order, calculate, and assess calorie counts as needed  - Recommend, monitor, and adjust tube feedings and TPN/PPN based on assessed needs  - Assess need for intravenous fluids  - Provide specific nutrition/hydration education as appropriate  - Include patient/family/caregiver in decisions related to nutrition  Outcome: Progressing     Problem: METABOLIC, FLUID AND ELECTROLYTES - ADULT  Goal: Electrolytes maintained within normal limits  Description: INTERVENTIONS:  - Monitor labs and assess patient for signs and symptoms of electrolyte imbalances  - Administer electrolyte replacement as ordered  - Monitor response to electrolyte replacements, including repeat lab results as appropriate  - Instruct patient on fluid and nutrition as appropriate  Outcome: Progressing  Goal: Fluid balance maintained  Description: INTERVENTIONS:  - Monitor labs   - Monitor I/O and WT  - Instruct patient on fluid and nutrition as appropriate  - Assess for signs & symptoms of volume excess or deficit  Outcome: Progressing

## 2022-07-13 NOTE — PROGRESS NOTES
[facility] Progress Note  Patient: Sherrill Torres 60 y o  male   MRN: [id number]  PCP: Jody Sosa MD  Unit/Bed#: 2 [place] [address] Encounter: [id number]  Date Of Visit: 07/13/22    Problem List:    Principal Problem:    Left hip pain  Active Problems:    ESRD (end stage renal disease) ([place] [address] )    Type 2 diabetes mellitus, without long-term current use of insulin ([place] [address] )    Hypertension    History of TIA (transient ischemic attack)      Assessment & Plan:    ESRD (end stage renal disease) ([place] [address] )  Assessment & Plan  · ESRD on HD TTS  · Continue with HD per renal while here    Results from last 7 days   Lab Units 07/12/22  0603 07/11/22  1021 07/10/22  0549 07/07/22  0437 07/06/22  1856   BUN mg/dL 95* 81* 51* 41* 40*   CREATININE mg/dL 6 82* 6 65* 5 39* 6 92* 6 87*   EGFR ml/min/1 73sq m 7 8 10 7 7       * Left hip pain  Assessment & Plan  Patient presented to the hospital with worsening left inguinal/ hip pain  history of severe left hip osteoarthritis per patient  · Noted to have stable B/L lymph nodes on imaging  · Question of cystitis however UA negative   · Patient seen by orthopedic initially and recommended to follow-up after discharge  · Continue pain control as needed  · Concern for gout history, acute gout flare, trialed on prednisone taper which has somewhat helped  · CT scan negative for fracture but did show hip and knee arthritis on the left    Type 2 diabetes mellitus, without long-term current use of insulin [facility])  Assessment & Plan  Lab Results   Component Value Date    HGBA1C 6 6 (H) 07/07/2022     Recent Labs     07/12/22  2119 07/13/22  0715 07/13/22  1056 07/13/22  1606   POCGLU 244* 112 147* 231*     · Continue linagliptin on discharge which is on hold currently    Continue sliding scale during hospitalization      History of TIA (transient ischemic attack)  Assessment & Plan  · Continue Plavix, statin    Hypertension  Assessment & Plan  · Continue Coreg, nifedipine  · Blood pressures remain stable    Left leg swelling-resolved as of 2022  Assessment & Plan  · Venous duplex negative for DVT      VTE Pharmacologic Prophylaxis: VTE Score: 3 Moderate Risk (Score 3-4) - Pharmacological DVT Prophylaxis Ordered: heparin  Patient Centered Rounds: I performed bedside rounds with nursing staff today  Discussions with Specialists or Other Care Team Provider: yes    Education and Discussions with Family / Patient: yes    Time Spent for Care: 30 minutes  More than 50% of total time spent on counseling and coordination of care as described above  Current Length of Stay: 6 day(s)  Current Patient Status: Inpatient   Certification Statement: The patient will continue to require additional inpatient hospital stay due to Patient appealed discharge  Discharge Plan: Anticipate discharge tomorrow to home  Code Status: Level 1 - Full Code    Subjective:     Patient continues to report pain along the left inguinal area although improving compared to before    Objective:     Vitals:   Temp (24hrs), Av 9 °F (36 6 °C), Min:97 6 °F (36 4 °C), Max:98 3 °F (36 8 °C)    Temp:  [97 6 °F (36 4 °C)-98 3 °F (36 8 °C)] 98 3 °F (36 8 °C)  HR:  [56-69] 64  Resp:  [17-18] 18  BP: (121-155)/(47-69) 155/69  SpO2:  [90 %-96 %] 90 %  Body mass index is 30 83 kg/m²  Input and Output Summary (last 24 hours): Intake/Output Summary (Last 24 hours) at 2022 1726  Last data filed at 2022 1000  Gross per 24 hour   Intake --   Output 900 ml   Net -900 ml       Physical Exam:   Physical Exam  Constitutional:       General: He is not in acute distress  Appearance: He is not diaphoretic  HENT:      Head: Normocephalic and atraumatic  Eyes:      General: No scleral icterus  Cardiovascular:      Rate and Rhythm: Normal rate and regular rhythm  Pulmonary:      Effort: Pulmonary effort is normal  No respiratory distress  Breath sounds: Normal breath sounds  No wheezing or rales  Abdominal:      General: Bowel sounds are normal  There is no distension  Palpations: Abdomen is soft  Tenderness: There is no abdominal tenderness  Neurological:      Mental Status: He is alert and oriented to person, place, and time  Additional Data:     Labs:  Results from last 7 days   Lab Units 07/12/22  0603   WBC Thousand/uL 8 55   HEMOGLOBIN g/dL 11 2*   HEMATOCRIT % 32 9*   PLATELETS Thousands/uL 281   NEUTROS PCT % 65   LYMPHS PCT % 23   MONOS PCT % 11   EOS PCT % 0     Results from last 7 days   Lab Units 07/12/22  0603 07/07/22  0437 07/06/22  1856   SODIUM mmol/L 135*   < > 138   POTASSIUM mmol/L 5 1   < > 4 3   CHLORIDE mmol/L 98*   < > 100   CO2 mmol/L 25   < > 25   BUN mg/dL 95*   < > 40*   CREATININE mg/dL 6 82*   < > 6 87*   ANION GAP mmol/L 12   < > 13   CALCIUM mg/dL 7 9*   < > 8 8   ALBUMIN g/dL  --   --  3 4*   TOTAL BILIRUBIN mg/dL  --   --  0 54   ALK PHOS U/L  --   --  121*   ALT U/L  --   --  43   AST U/L  --   --  24   GLUCOSE RANDOM mg/dL 120   < > 154*    < > = values in this interval not displayed  Results from last 7 days   Lab Units 07/13/22  1606 07/13/22  1056 07/13/22  0715 07/12/22  2119 07/12/22  1554 07/12/22  1128 07/12/22  0814 07/11/22  2115 07/11/22  1624 07/11/22  1138 07/11/22  0804 07/10/22  2034   POC GLUCOSE mg/dl 231* 147* 112 244* 189* 116 137 205* 238* 188* 191* 242*     Results from last 7 days   Lab Units 07/07/22  0437   HEMOGLOBIN A1C % 6 6*           Lines/Drains:  Invasive Devices  Report    Peripheral Intravenous Line  Duration           Peripheral IV 07/09/22 Proximal;Right;Ventral (anterior) Forearm 4 days          Hemodialysis Catheter  Duration           HD Permanent Double Catheter 167 days                      Imaging: No pertinent imaging reviewed      Recent Cultures (last 7 days):         Last 24 Hours Medication List:   Current Facility-Administered Medications   Medication Dose Route Frequency Provider Last Rate    acetaminophen  975 mg Oral Q8H [facility] Lore Silver DO      allopurinol  100 mg Oral Daily Brina Milton PA-C      atorvastatin  80 mg Oral HS Brina DENISSE Milton      carvedilol  12 5 mg Oral BID Paulo Suggs PA-C      clopidogrel  75 mg Oral Daily Brina DENISSE Milton      heparin (porcine)  5,000 Units Subcutaneous Q8H [facility] Brina VecDENISSE valenzuela      insulin lispro  1-6 Units Subcutaneous TID AC Brina VecvahidoDENISSE      insulin lispro  1-6 Units Subcutaneous HS Brina Milton PA-C      NIFEdipine  30 mg Oral BID Paulo Suggs PA-C      ondansetron  4 mg Intravenous Q6H PRN Paulo Suggs PA-C      oxyCODONE  10 mg Oral Q4H PRN Wilma Chen,       oxyCODONE  5 mg Oral Q4H PRN Dillonartemio Chen, DO      pneumococcal 23-valent polysaccharide vaccine  0 5 mL Subcutaneous Prior to discharge Lore Silver DO      [START ON 7/14/2022] predniSONE  10 mg Oral Daily Freddy Carrasquillo DO      sevelamer  800 mg Oral TID With Meals Paulo Suggs PA-C          Today, Patient Was Seen By: Ryann Torres DO    ** Please Note: "This note has been constructed using a voice recognition system  Therefore there may be syntax, spelling, and/or grammatical errors   Please call if you have any questions  "**

## 2022-07-13 NOTE — PROGRESS NOTES
[address] [facility] NOTE   Everton Chaves 60 y o  male MRN: [id number]  Unit/Bed#: 2  Encounter: [id number]  Reason for Consult:  ESRD    ASSESSMENT and PLAN:    0 - Андрей Carpio - 56 yo male with PMHx of ESRD, CAD, HTN, TIA, DM, p/w L groin pain  Nephrology on board for ESRD     1) ESRD     - HD TTS [address]  - dialysis dependent since 2/2022  - has some residual renal function  - Access - R IJ Perm cath (no drainage, no erythema)  -7/12-patient completed dialysis     Plan:     - continue dialysis TTS schedule  -from the renal standpoint next set of lab work can be with dialysis on Saturday if remains inpatient  -change diet to 2 potassium diet-patient agreeable     2) L groin pain     - per primary team  -no rashes noted on exam  - Xray hip -  - duplex neg 7/7 but tech diff study  - Echogenic structure in L inguinal region consistent with enlarged lymph node  - CT scan - inguinal region unremarkable  - will need outpt f/u to ensure resolution of lymph node enlargement  -further plans per primary team  -CT scan on 07/11 with no acute findings, left hip anemia osteoarthritis     3) anemia     - stable Hb above goal     4) MBD     - cont phos binder     5) electrolytes-stable sodium  Potassium above goal slightly  Dialysis completed 7/12     6) Acid/base-bicarbonate stable     7) HTN     - initially with accelerated HTN  Now normotensive    SUBJECTIVE / 24H INTERVAL HISTORY:    Blood pressure is 062-834 systolic  Afebrile  On room air  Patient continues to have left groin site pain      OBJECTIVE:  Current Weight: Weight - Scale: 106 kg (233 lb 11 oz)  Vitals:    07/12/22 2018 07/12/22 2246 07/12/22 2312 07/13/22 0823   BP: 143/65 (!) 126/47 121/56 143/65   BP Location: Right arm  Right arm    Pulse: 65 69 56 60   Resp: 17 18 17    Temp: 97 9 °F (36 6 °C) 97 6 °F (36 4 °C)  97 9 °F (36 6 °C)   TempSrc: Oral Oral     SpO2: 96% 94% 93% 94%   Weight:       Height: Intake/Output Summary (Last 24 hours) at 7/13/2022 0826  Last data filed at 7/13/2022 0450  Gross per 24 hour   Intake 500 ml   Output 2525 ml   Net -2025 ml     General: NAD  Skin: no rash  Eyes: anicteric sclera  ENT: moist mucous membrane  Neck: supple  Chest: CTA b/l, no ronchii, no wheeze, no rubs, no rales  CVS: s1s2, no murmur, no gallop, no rub  Abdomen: soft, nontender, nl sounds  Extremities: no edema LE b/l  : no gaitan  Neuro: AAOX3  Psych: normal affect  Tunneled catheter site without drainage  No erythema    Medications:    Current Facility-Administered Medications:     acetaminophen (TYLENOL) tablet 975 mg, 975 mg, Oral, Q8H [address], Lore Revankar, DO, 975 mg at 07/13/22 0541    allopurinol (ZYLOPRIM) tablet 100 mg, 100 mg, Oral, Daily, Brina Vecellio, PA-C, 100 mg at 07/12/22 1220    atorvastatin (LIPITOR) tablet 80 mg, 80 mg, Oral, HS, Brina Vecellio, PA-C, 80 mg at 07/12/22 2123    carvedilol (COREG) tablet 12 5 mg, 12 5 mg, Oral, BID, Brina Vecellio, PA-C, 12 5 mg at 07/12/22 2123    clopidogrel (PLAVIX) tablet 75 mg, 75 mg, Oral, Daily, Brina Vecellio, PA-C, 75 mg at 07/12/22 1220    heparin (porcine) subcutaneous injection 5,000 Units, 5,000 Units, Subcutaneous, Q8H [address], Brina Vecellio, PA-C, 5,000 Units at 07/13/22 0541    HYDROmorphone HCl (DILAUDID) injection 0 2 mg, 0 2 mg, Intravenous, Q4H PRN, Freddy Carrasquillo, DO, 0 2 mg at 07/08/22 1637    insulin lispro (HumaLOG) 100 units/mL subcutaneous injection 1-6 Units, 1-6 Units, Subcutaneous, TID AC, 1 Units at 07/12/22 1643 **AND** Fingerstick Glucose (POCT), , , TID AC, Brina Karine PA-C    insulin lispro (HumaLOG) 100 units/mL subcutaneous injection 1-6 Units, 1-6 Units, Subcutaneous, HS, Brina CARSON Milton-C, 3 Units at 07/12/22 2121    NIFEdipine (PROCARDIA XL) 24 hr tablet 30 mg, 30 mg, Oral, BID, Brina CARSON Milton-C, 30 mg at 07/12/22 2115    ondansetron (ZOFRAN) injection 4 mg, 4 mg, Intravenous, Q6H PRN, Kaitlin Aranda PA-C    oxyCODONE (ROXICODONE) immediate release tablet 10 mg, 10 mg, Oral, Q4H PRN, Freddy Carrasquillo DO, 10 mg at 07/12/22 1644    oxyCODONE (ROXICODONE) IR tablet 5 mg, 5 mg, Oral, Q4H PRN, Gavino DO Ashley    pneumococcal 23-valent polysaccharide vaccine (PNEUMOVAX-23) injection 0 5 mL, 0 5 mL, Subcutaneous, Prior to discharge, Lore Silver DO    [COMPLETED] predniSONE tablet 60 mg, 60 mg, Oral, Daily, 60 mg at 07/09/22 0825 **FOLLOWED BY** [COMPLETED] predniSONE tablet 40 mg, 40 mg, Oral, Daily, 40 mg at 07/11/22 0829 **FOLLOWED BY** predniSONE tablet 20 mg, 20 mg, Oral, Daily, 20 mg at 07/12/22 1219 **FOLLOWED BY** [START ON 7/14/2022] predniSONE tablet 10 mg, 10 mg, Oral, Daily, Freddy Carrasquillo DO    sevelamer (RENAGEL) tablet 800 mg, 800 mg, Oral, TID With Meals, Kaitlin Aranda PA-C, 800 mg at 07/12/22 1643    Laboratory Results:  Results from last 7 days   Lab Units 07/12/22  0603 07/11/22  1021 07/10/22  0549 07/07/22  0437 07/06/22  1856   WBC Thousand/uL 8 55 9 88 10 19* 9 56 12 42*   HEMOGLOBIN g/dL 11 2* 10 8* 10 8* 9 8* 10 8*   HEMATOCRIT % 32 9* 32 7* 32 7* 30 7* 33 4*   PLATELETS Thousands/uL 281 285 281 236 248   POTASSIUM mmol/L 5 1 4 8 4 5 4 3 4 3   CHLORIDE mmol/L 98* 96* 94* 98* 100   CO2 mmol/L 25 26 27 25 25   BUN mg/dL 95* 81* 51* 41* 40*   CREATININE mg/dL 6 82* 6 65* 5 39* 6 92* 6 87*   CALCIUM mg/dL 7 9* 7 8* 8 3 8 5 8 8

## 2022-07-14 VITALS
BODY MASS INDEX: 30.97 KG/M2 | TEMPERATURE: 97 F | OXYGEN SATURATION: 94 % | SYSTOLIC BLOOD PRESSURE: 139 MMHG | RESPIRATION RATE: 18 BRPM | WEIGHT: 233.69 LBS | DIASTOLIC BLOOD PRESSURE: 52 MMHG | HEIGHT: 73 IN | HEART RATE: 65 BPM

## 2022-07-14 LAB — GLUCOSE SERPL-MCNC: 128 MG/DL (ref 65–140)

## 2022-07-14 PROCEDURE — 99232 SBSQ HOSP IP/OBS MODERATE 35: CPT | Performed by: INTERNAL MEDICINE

## 2022-07-14 PROCEDURE — 90732 PPSV23 VACC 2 YRS+ SUBQ/IM: CPT | Performed by: FAMILY MEDICINE

## 2022-07-14 PROCEDURE — 82948 REAGENT STRIP/BLOOD GLUCOSE: CPT

## 2022-07-14 PROCEDURE — G0009 ADMIN PNEUMOCOCCAL VACCINE: HCPCS | Performed by: FAMILY MEDICINE

## 2022-07-14 RX ORDER — OXYCODONE HYDROCHLORIDE 5 MG/1
5 TABLET ORAL EVERY 8 HOURS PRN
Qty: 10 TABLET | Refills: 0 | Status: SHIPPED | OUTPATIENT
Start: 2022-07-14 | End: 2022-07-16 | Stop reason: SDUPTHER

## 2022-07-14 RX ORDER — PREDNISONE 10 MG/1
10 TABLET ORAL DAILY
Qty: 1 TABLET | Refills: 0 | Status: SHIPPED | OUTPATIENT
Start: 2022-07-15 | End: 2022-07-16 | Stop reason: SDUPTHER

## 2022-07-14 RX ORDER — NALOXONE HYDROCHLORIDE 4 MG/.1ML
SPRAY NASAL
Qty: 1 EACH | Refills: 0 | Status: SHIPPED | OUTPATIENT
Start: 2022-07-14 | End: 2022-07-16 | Stop reason: SDUPTHER

## 2022-07-14 RX ADMIN — PREDNISONE 10 MG: 10 TABLET ORAL at 08:27

## 2022-07-14 RX ADMIN — CARVEDILOL 12.5 MG: 12.5 TABLET, FILM COATED ORAL at 08:27

## 2022-07-14 RX ADMIN — CLOPIDOGREL BISULFATE 75 MG: 75 TABLET ORAL at 08:27

## 2022-07-14 RX ADMIN — HEPARIN SODIUM 5000 UNITS: 5000 INJECTION INTRAVENOUS; SUBCUTANEOUS at 05:27

## 2022-07-14 RX ADMIN — ACETAMINOPHEN 975 MG: 325 TABLET ORAL at 05:27

## 2022-07-14 RX ADMIN — ALLOPURINOL 100 MG: 100 TABLET ORAL at 08:27

## 2022-07-14 RX ADMIN — NIFEDIPINE 30 MG: 30 TABLET, EXTENDED RELEASE ORAL at 08:27

## 2022-07-14 RX ADMIN — PNEUMOCOCCAL VACCINE POLYVALENT 0.5 ML
25; 25; 25; 25; 25; 25; 25; 25; 25; 25; 25; 25; 25; 25; 25; 25; 25; 25; 25; 25; 25; 25; 25 INJECTION, SOLUTION INTRAMUSCULAR; SUBCUTANEOUS at 08:32

## 2022-07-14 NOTE — PROGRESS NOTES
[address] [facility] NOTE   Maria Luz Anderson 60 y o  male MRN: [id number]  Unit/Bed#: 2 [id number] Encounter: [id number]  Reason for Consult: ESRD    ASSESSMENT and PLAN:    0 - America Carpio - 56 yo male with PMHx of ESRD, CAD, HTN, TIA, DM, p/w L groin pain  Nephrology on board for ESRD     1) ESRD     - HD TTS [address]  - dialysis dependent since 2/2022  - has some residual renal function  - Access - R IJ Perm cath (no drainage, no erythema)  -7/12-patient completed dialysis  -7/14-patient stating that he has to be discharged by noon today  Patient stating called his dialysis unit who offered him an extra chair time tomorrow for treatment but patient declined  He states that he will only go next on Saturday outpatient      Plan:     - continue dialysis TTS schedule-I have reached out to the primary team attending to better understand the patient's final disposition  If the patient is to remain inpatient through the day today, we can dialyze in-patient today  -patient understands that it is not safe to wait until Saturday for the next dialysis treatment  -patient states that he will monitor his fluid intake and potassium intake until then  -from the renal standpoint next set of lab work can be with dialysis on Saturday if remains inpatient    We had set the patient up for dialysis this afternoon    But update, it appears the patient has left the hospital   Patient was aware that his decision to wait for dialysis until Saturday is not safe      2) L groin pain     - per primary team  -no rashes noted on exam  - Xray hip -  - duplex neg 7/7 but tech diff study  - Echogenic structure in L inguinal region consistent with enlarged lymph node  - CT scan - inguinal region unremarkable  - will need outpt f/u to ensure resolution of lymph node enlargement  -further plans per primary team  -CT scan on 07/11 with no acute findings, left hip anemia osteoarthritis     3) anemia     - stable Hb above goal     4) MBD     - cont phos binder     5) electrolytes-stable sodium   Potassium above goal slightly   Dialysis completed 7/12     6) Acid/base-bicarbonate stable     7) HTN     - initially with accelerated HTN  Now normotensive    SUBJECTIVE / 24H INTERVAL HISTORY:    Patient lying in bed  Is not short breath  Blood pressure is 958-059 systolic  Afebrile  On room air  OBJECTIVE:  Current Weight: Weight - Scale: 106 kg (233 lb 11 oz)  Vitals:    07/13/22 1516 07/13/22 1941 07/13/22 2251 07/14/22 0751   BP: 155/69 143/62 157/76 139/52   BP Location:  Right arm Right arm    Pulse: 64 66 63 65   Resp: 18 17 18    Temp: 98 3 °F (36 8 °C) 97 8 °F (36 6 °C) (!) 97 °F (36 1 °C)    TempSrc:  Oral Oral    SpO2: 90% 91% 93% 94%   Weight:       Height:           Intake/Output Summary (Last 24 hours) at 7/14/2022 0816  Last data filed at 7/13/2022 1000  Gross per 24 hour   Intake --   Output 150 ml   Net -150 ml     General: NAD  Skin: no rash  Eyes: anicteric sclera  ENT: moist mucous membrane  Neck: supple  Chest: CTA b/l, no ronchii, no wheeze, no rubs, no rales  CVS: s1s2, no murmur, no gallop, no rub  Abdomen: soft, nontender, nl sounds  Extremities: no edema LE b/l  : no gaitan  Neuro: AAOX3  Psych: normal affect  -tunneled catheter site without drainage  No significant erythema      Medications:    Current Facility-Administered Medications:     acetaminophen (TYLENOL) tablet 975 mg, 975 mg, Oral, Q8H KEMAL, Lore Revankar, DO, 975 mg at 07/14/22 0527    allopurinol (ZYLOPRIM) tablet 100 mg, 100 mg, Oral, Daily, Brina Vecellio, PA-C, 100 mg at 07/13/22 0931    atorvastatin (LIPITOR) tablet 80 mg, 80 mg, Oral, HS, Brina Vecellio, PA-C, 80 mg at 07/13/22 2131    carvedilol (COREG) tablet 12 5 mg, 12 5 mg, Oral, BID, Brina Vecellio, PA-C, 12 5 mg at 07/13/22 2006    clopidogrel (PLAVIX) tablet 75 mg, 75 mg, Oral, Daily, Brina DENISSE Milton, 75 mg at 07/13/22 0932    heparin (porcine) subcutaneous injection 5,000 Units, 5,000 Units, Subcutaneous, Q8H [address], Brina Vecvahido, PA-C, 5,000 Units at 07/14/22 0527    insulin lispro (HumaLOG) 100 units/mL subcutaneous injection 1-6 Units, 1-6 Units, Subcutaneous, TID AC, 3 Units at 07/13/22 1653 **AND** Fingerstick Glucose (POCT), , , TID AC, Brina Vecvahido, PA-C    insulin lispro (HumaLOG) 100 units/mL subcutaneous injection 1-6 Units, 1-6 Units, Subcutaneous, HS, Brina Meenao, PA-C, 1 Units at 07/13/22 2133    NIFEdipine (PROCARDIA XL) 24 hr tablet 30 mg, 30 mg, Oral, BID, CARSON Jama-C, 30 mg at 07/13/22 2006    ondansetron (ZOFRAN) injection 4 mg, 4 mg, Intravenous, Q6H PRN, Danielle Ramirez PA-C    oxyCODONE (ROXICODONE) immediate release tablet 10 mg, 10 mg, Oral, Q4H PRN, Freddy Carrasquillo DO, 10 mg at 07/13/22 2005    oxyCODONE (ROXICODONE) IR tablet 5 mg, 5 mg, Oral, Q4H PRN, Bird Will DO    pneumococcal 23-valent polysaccharide vaccine (PNEUMOVAX-23) injection 0 5 mL, 0 5 mL, Subcutaneous, Prior to discharge, Lore Silver DO    [COMPLETED] predniSONE tablet 60 mg, 60 mg, Oral, Daily, 60 mg at 07/09/22 0825 **FOLLOWED BY** [COMPLETED] predniSONE tablet 40 mg, 40 mg, Oral, Daily, 40 mg at 07/11/22 0829 **FOLLOWED BY** [COMPLETED] predniSONE tablet 20 mg, 20 mg, Oral, Daily, 20 mg at 07/13/22 0931 **FOLLOWED BY** predniSONE tablet 10 mg, 10 mg, Oral, Daily, Freddy Carrasquillo DO    sevelamer (RENAGEL) tablet 800 mg, 800 mg, Oral, TID With Meals, Danielle Ramirez PA-C, 800 mg at 07/13/22 1246    Laboratory Results:  Results from last 7 days   Lab Units 07/12/22  0603 07/11/22  1021 07/10/22  0549   WBC Thousand/uL 8 55 9 88 10 19*   HEMOGLOBIN g/dL 11 2* 10 8* 10 8*   HEMATOCRIT % 32 9* 32 7* 32 7*   PLATELETS Thousands/uL 281 285 281   POTASSIUM mmol/L 5 1 4 8 4 5   CHLORIDE mmol/L 98* 96* 94*   CO2 mmol/L 25 26 27   BUN mg/dL 95* 81* 51*   CREATININE mg/dL 6 82* 6 65* 5 39*   CALCIUM mg/dL 7 9* 7 8* 8 3

## 2022-07-14 NOTE — DISCHARGE INSTRUCTIONS
You have been given an extra chair time tomorrow for dialysis treatment which you should keep   Do not wait till Saturday for your next treatment

## 2022-07-14 NOTE — CASE MANAGEMENT
Case Management Progress Note    Patient name Yenni Regalado  Location [address] [address] Erin Lafleur MRN [id number]  : 1960 Date 2022       LOS (days): 7  Geometric Mean LOS (GMLOS) (days): 4 20  Days to GMLOS:-2 7        OBJECTIVE:        Current admission status: Inpatient  Preferred Pharmacy:   [address] [place] [address]  Karsten [address], [facility] [address]  Phone: [phone] Fax: [phone]    Primary Care Provider: Rosa Abbott MD    Primary Insurance: MEDICARE  Secondary Insurance:     PROGRESS NOTE:    CM set up lyft for pt, lyft waiver signed  Pt being transport via lyft now

## 2022-07-15 NOTE — DISCHARGE SUMMARY
Discharge Summary - [facility] Internal Medicine    Patient Information: Milagros Stanley 60 y o  male MRN: [id number]  Unit/Bed#: [address] [facility] Encounter: [id number]    Discharging Physician / Practitioner: Marisol Huang DO  PCP: Melquiades Redd MD  Admission Date: 7/6/2022  Discharge Date: 07/14/22    Reason for Admission: Pain (In groin, increasing since this am - no fall)      Discharge Diagnoses:     Principal Problem:    Left hip pain  Active Problems:    ESRD (end stage renal disease) ([facility] )    Type 2 diabetes mellitus, without long-term current use of insulin ([facility] )    Hypertension    History of TIA (transient ischemic attack)  Resolved Problems:    Left leg swelling        * Left hip pain  Assessment & Plan  Patient presented to the hospital with worsening left inguinal/ hip pain  history of severe left hip osteoarthritis per patient    · Noted to have B/L lymph nodes on imaging which is stable  · Question of cystitis however UA negative   · Patient seen by orthopedic initially and recommended to follow-up after discharge  · Continue pain medications as needed  · Concern for gout history, acute gout flare, trialed on prednisone taper which has helped somewhat  · CT scan negative for fracture but did show hip and knee arthritis on the left    [facility]end stage renal [facility]) ([facility] )  Assessment & Plan  · ESRD on HD TTS  · Received HD per renal while here  · Patient was offered extra chair time tomorrow however he states that he will wait till his next scheduled dialysis on Saturday    Results from last 7 days   Lab Units 07/12/22  0603 07/11/22  1021 07/10/22  0549 07/07/22  0437 07/06/22  1856   BUN mg/dL 95* 81* 51* 41* 40*   CREATININE mg/dL 6 82* 6 65* 5 39* 6 92* 6 87*   EGFR ml/min/1 73sq m 7 8 10 7 7       Type 2 diabetes mellitus, without long-term current use of insulin [facility])  Assessment & Plan  Lab Results   Component Value Date    HGBA1C 6 6 (H) 07/07/2022     Recent Labs     07/13/22  1056 07/13/22  1606 07/13/22 2054 07/14/22  0720   POCGLU 147* 231* 162* 128     · Continue linagliptin on discharge which was held here  On sliding scale during hospitalization      History of TIA (transient ischemic attack)  Assessment & Plan  · Continue statin, Plavix    Hypertension  Assessment & Plan  · Continue nifedipine, Coreg on dc    Left leg swelling-resolved as of 7/13/2022  Assessment & Plan  · Venous duplex negative for DVT      Consultations During Hospital Stay:  IP CONSULT TO NEPHROLOGY  IP CONSULT TO ORTHOPEDIC SURGERY    Procedures Performed:     · none    Significant Findings:     · Refer to hospital course and above listed diagnosis related plan for details    Imaging while in hospital:    CT abdomen pelvis wo contrast    Result Date: 7/6/2022  Narrative: CT ABDOMEN AND PELVIS WITHOUT IV CONTRAST INDICATION:   LLQ abdominal pain left inguinal pain  COMPARISON:  1/23/2022  TECHNIQUE:  CT examination of the abdomen and pelvis was performed without intravenous contrast  This examination was performed without intravenous contrast in the context of the critical nationwide Omnipaque shortage  Axial, sagittal, and coronal 2D reformatted images were created from the source data and submitted for interpretation  Radiation dose length product (DLP) for this visit:  994 39 mGy-cm   This examination, like all CT scans performed in the [facility], was performed utilizing techniques to minimize radiation dose exposure, including the use of iterative  reconstruction and automated exposure control  Enteric contrast was not administered  FINDINGS: ABDOMEN LOWER CHEST:  No clinically significant abnormality identified in the visualized lower chest  LIVER/BILIARY TREE:  Unremarkable  GALLBLADDER:  No calcified gallstones  No pericholecystic inflammatory change  SPLEEN:  Unremarkable  PANCREAS:  Unremarkable  ADRENAL GLANDS:  Unremarkable   KIDNEYS/URETERS:  There are small probable bilateral renal cysts some of which appear hyperdense as before  These can be further evaluated with ultrasound  No CT evidence of nephrolithiasis or hydronephrosis  No hydroureter  STOMACH AND BOWEL:  There is no bowel obstruction  There is mild colonic diverticulosis without acute diverticulitis  No focal inflammatory process  APPENDIX:  A normal appendix was visualized  ABDOMINOPELVIC CAVITY:  No ascites  No pneumoperitoneum  No lymphadenopathy  Small fat-containing retroperitoneal densities possibly small fat-containing lymph nodes are stable and unchanged  Stable small bilateral groin lymph nodes  VESSELS:  The abdominal is calcified but normal in caliber  No retroperitoneal hematoma  PELVIS REPRODUCTIVE ORGANS:  The prostate is enlarged  URINARY BLADDER:  Minimal bladder wall thickening  Underlying cystitis not excluded  ABDOMINAL WALL/INGUINAL REGIONS:  Unremarkable  OSSEOUS STRUCTURES:  Healing fracture of the T10 vertebral body noted  There is moderate to advanced left hip and moderate right hip osteoarthrosis  Impression: No bowel obstruction  Colonic diverticulosis without acute diverticulitis  Healing T10 vertebral body fracture  Minimal bladder wall thickening  Underlying cystitis not excluded  Additional findings as above  Workstation performed: [id number]     XR hip/pelv 2-3 vws left if performed    Result Date: 7/11/2022  Narrative: LEFT HIP INDICATION:   Increasing left hip pain  No reported trauma  COMPARISON:  None VIEWS:  XR HIP/PELV 2-3 VWS LEFT  W PELVIS IF PERFORMED FINDINGS: Bones are intact  Preserved alignment  No suspicious lytic or blastic bone lesion  Left greater than right hip osteoarthritis  Spine degenerative change  Soft tissue vascular calcification  Impression: No acute osseous abnormality  Degenerative changes    Workstation performed: [id number]     VAS lower limb venous duplex study, complete bilateral    Result Date: 7/8/2022  Narrative:  [facility] REPORT CLINICAL: Indications: Patient presents with left lower extremity pain  CONCLUSION: Impression: RIGHT LOWER LIMB: No evidence of acute or chronic deep vein thrombosis  No evidence of superficial thrombophlebitis noted  Doppler evaluation shows a normal response to augmentation maneuvers  Popliteal, posterior tibial and anterior tibial arterial Doppler waveforms are triphasic  LEFT LOWER LIMB: No evidence of acute or chronic deep vein thrombosis  No evidence of superficial thrombophlebitis noted  Doppler evaluation shows a normal response to augmentation maneuvers  Popliteal, posterior tibial and anterior tibial arterial Doppler waveforms are triphasic  Tech Note: There is an echogenic structure located in the inguinal region  This structure measures approximately 1 75cm x 3 cm and is consistent with enlarged lymph node and channels  Technically difficult/limited study secondary to inability to reposition  SIGNATURE: Electronically Signed by: Lavern Hashimoto, MD, [address] on 2022-07-08 09:53:58 AM    CT lower extremity wo contrast left    Result Date: 7/11/2022  Narrative: CT left hip and femur  without IV contrast INDICATION: pain  Per ED notes, presented with worsening left hip/inguinal pain, severe left hip osteoarthritis per patient  No reported trauma  COMPARISON: 7/6/2022 abdominopelvic CT TECHNIQUE: CT examination of the above was performed  This examination was performed without intravenous contrast in the context of the critical nationwide Omnipaque shortage  This examination, like all CT scans performed in the [facility], was performed utilizing techniques to minimize radiation dose exposure, including the use of iterative reconstruction and automated exposure control software  Sagittal and coronal two dimensional reconstructed images were also submitted for interpretation  Rad dose  965 mGy-cm FINDINGS: OSSEOUS STRUCTURES:  Intact  Preserved alignment   Moderate to marked left hip and knee osteophytes  Superior left hip joint space narrowing  Small knee suprapatella effusion  No suspicious bone lesions  VISUALIZED MUSCULATURE:  No acute findings  Mild muscle fatty infiltration, predominantly gluteus, tensor fasciae latae and hamstrings, particularly semimembranosus  SOFT TISSUES:  Vascular calcification  No acute findings  OTHER PERTINENT FINDINGS:  None  Impression: No acute findings  Left hip and knee osteoarthritis  Workstation performed: [id number]       Incidental Findings:   · none    Test Results Pending at Discharge (will require follow up):   · As per After Visit Summary     Outpatient Tests Requested:  · none    Complications:  Refer to hospital course and above listed diagnosis related plan, if any    Hospital Course:     Michael Soto is a 60 y o  male patient who originally presented to the hospital on 7/6/2022 due to progressively worsening left groin pain when he woke up  He No falls or trauma to the area  Pain worse with movement  Patient was admitted and seen by Orthopedics who recommended no further intervention  Patient was also seen by Nephrology and underwent dialysis per Nephrology  patient started on pain medications p r n  per physical therapy, patient cleared for discharge home  After multiple imaging, no obvious fracture noted and patient was medically stable for discharge  The patient was discharged on 07/12 but he appealed the discharge  Patient being discharged today    Please see above list of diagnoses and related plan for additional information         Condition at Discharge: stable     Discharge Day Visit / Exam:     Subjective:  Reports improving pain along the left inguinal region    Vitals: Blood Pressure: 139/52 (07/14/22 0751)  Pulse: 65 (07/14/22 0751)  Temperature: (!) 97 °F (36 1 °C) (07/13/22 2251)  Temp Source: Oral (07/13/22 2251)  Respirations: 18 (07/13/22 2251)  Height: 6' 1" (185 4 cm) (07/06/22 2244)  Weight - Scale: 106 kg (233 lb 11 oz) (07/06/22 2842)  SpO2: 94 % (07/14/22 0751)  Exam:   Physical Exam  Constitutional:       General: He is not in acute distress  Appearance: He is not diaphoretic  HENT:      Head: Normocephalic and atraumatic  Eyes:      General: No scleral icterus  Right eye: No discharge  Left eye: No discharge  Cardiovascular:      Rate and Rhythm: Normal rate and regular rhythm  Pulmonary:      Effort: Pulmonary effort is normal  No respiratory distress  Breath sounds: Normal breath sounds  No wheezing or rales  Abdominal:      General: Bowel sounds are normal  There is no distension  Palpations: Abdomen is soft  Tenderness: There is no abdominal tenderness  Musculoskeletal:      Comments: Improved ROM along left hip area   Neurological:      Mental Status: He is alert  Discharge instructions/Information to patient and family:(Discharge Medications and Follow up):   See after visit summary for information provided to patient and family  Provisions for Follow-Up Care:  See after visit summary for information related to follow-up care and any pertinent home health orders  Disposition: Home with [facility]    Planned Readmission:  No     Discharge Statement:  I spent > 30 minutes discharging the patient  This time was spent on the day of discharge  I had direct contact with the patient on the day of discharge  Greater than 50% of the total time was spent examining patient, answering all patient questions, arranging and discussing plan of care with patient as well as directly providing post-discharge instructions  Additional time then spent on discharge activities  Discharge Medications:  See after visit summary for reconciled discharge medications provided to patient and family  ** Please Note: "This note has been constructed using a voice recognition system  Therefore there may be syntax, spelling, and/or grammatical errors   Please call if you have any questions  "**

## 2022-07-16 RX ORDER — NALOXONE HYDROCHLORIDE 4 MG/.1ML
SPRAY NASAL
Qty: 1 EACH | Refills: 0 | Status: SHIPPED | OUTPATIENT
Start: 2022-07-16

## 2022-07-16 RX ORDER — PREDNISONE 10 MG/1
10 TABLET ORAL DAILY
Qty: 1 TABLET | Refills: 0 | Status: SHIPPED | OUTPATIENT
Start: 2022-07-16 | End: 2022-07-17

## 2022-07-16 RX ORDER — OXYCODONE HYDROCHLORIDE 5 MG/1
5 TABLET ORAL EVERY 8 HOURS PRN
Qty: 10 TABLET | Refills: 0 | Status: ON HOLD | OUTPATIENT
Start: 2022-07-16 | End: 2022-07-31 | Stop reason: SDUPTHER

## 2022-07-26 ENCOUNTER — APPOINTMENT (OUTPATIENT)
Dept: RADIOLOGY | Facility: HOSPITAL | Age: 62
DRG: 553 | End: 2022-07-26
Payer: MEDICARE

## 2022-07-26 ENCOUNTER — APPOINTMENT (EMERGENCY)
Dept: RADIOLOGY | Facility: HOSPITAL | Age: 62
DRG: 553 | End: 2022-07-26
Payer: MEDICARE

## 2022-07-26 ENCOUNTER — HOSPITAL ENCOUNTER (INPATIENT)
Facility: HOSPITAL | Age: 62
LOS: 4 days | Discharge: HOME | DRG: 553 | End: 2022-07-31
Attending: EMERGENCY MEDICINE | Admitting: FAMILY MEDICINE
Payer: MEDICARE

## 2022-07-26 DIAGNOSIS — K59.00 CONSTIPATION: ICD-10-CM

## 2022-07-26 DIAGNOSIS — M16.12 OSTEOARTHRITIS OF LEFT HIP: ICD-10-CM

## 2022-07-26 DIAGNOSIS — S22.079A T10 VERTEBRAL FRACTURE (HCC): ICD-10-CM

## 2022-07-26 DIAGNOSIS — M25.552 LEFT HIP PAIN: ICD-10-CM

## 2022-07-26 DIAGNOSIS — R10.32 LEFT GROIN PAIN: ICD-10-CM

## 2022-07-26 DIAGNOSIS — N45.3 EPIDIDYMO-ORCHITIS, ACUTE: ICD-10-CM

## 2022-07-26 DIAGNOSIS — R10.31 RIGHT GROIN PAIN: ICD-10-CM

## 2022-07-26 DIAGNOSIS — D72.829 LEUKOCYTOSIS, UNSPECIFIED TYPE: ICD-10-CM

## 2022-07-26 DIAGNOSIS — E11.9 DIABETES MELLITUS (HCC): ICD-10-CM

## 2022-07-26 DIAGNOSIS — D64.9 ANEMIA: ICD-10-CM

## 2022-07-26 DIAGNOSIS — N18.6 ESRD (END STAGE RENAL DISEASE) (HCC): Primary | ICD-10-CM

## 2022-07-26 DIAGNOSIS — M25.511 RIGHT SHOULDER PAIN: ICD-10-CM

## 2022-07-26 DIAGNOSIS — N50.812 LEFT TESTICULAR PAIN: ICD-10-CM

## 2022-07-26 LAB
ALBUMIN SERPL BCP-MCNC: 3 G/DL (ref 3.5–5)
ALP SERPL-CCNC: 105 U/L (ref 46–116)
ALT SERPL W P-5'-P-CCNC: 19 U/L (ref 12–78)
AMORPH URATE CRY URNS QL MICRO: NORMAL /HPF
ANION GAP SERPL CALCULATED.3IONS-SCNC: 13 MMOL/L (ref 4–13)
AST SERPL W P-5'-P-CCNC: 16 U/L (ref 5–45)
BACTERIA UR QL AUTO: NORMAL /HPF
BASOPHILS # BLD AUTO: 0.02 THOUSANDS/ΜL (ref 0–0.1)
BASOPHILS NFR BLD AUTO: 0 % (ref 0–1)
BILIRUB SERPL-MCNC: 0.22 MG/DL (ref 0.2–1)
BILIRUB UR QL STRIP: NEGATIVE
BUN SERPL-MCNC: 52 MG/DL (ref 5–25)
CALCIUM ALBUM COR SERPL-MCNC: 9.4 MG/DL (ref 8.3–10.1)
CALCIUM SERPL-MCNC: 8.6 MG/DL (ref 8.3–10.1)
CHLORIDE SERPL-SCNC: 95 MMOL/L (ref 96–108)
CLARITY UR: CLEAR
CO2 SERPL-SCNC: 26 MMOL/L (ref 21–32)
COLOR UR: YELLOW
CREAT SERPL-MCNC: 7.84 MG/DL (ref 0.6–1.3)
CRP SERPL QL: 179.2 MG/L
EOSINOPHIL # BLD AUTO: 0.19 THOUSAND/ΜL (ref 0–0.61)
EOSINOPHIL NFR BLD AUTO: 1 % (ref 0–6)
ERYTHROCYTE [DISTWIDTH] IN BLOOD BY AUTOMATED COUNT: 15.1 % (ref 11.6–15.1)
GFR SERPL CREATININE-BSD FRML MDRD: 6 ML/MIN/1.73SQ M
GLUCOSE SERPL-MCNC: 136 MG/DL (ref 65–140)
GLUCOSE SERPL-MCNC: 139 MG/DL (ref 65–140)
GLUCOSE SERPL-MCNC: 182 MG/DL (ref 65–140)
GLUCOSE UR STRIP-MCNC: ABNORMAL MG/DL
HCT VFR BLD AUTO: 30.4 % (ref 36.5–49.3)
HGB BLD-MCNC: 9.8 G/DL (ref 12–17)
HGB UR QL STRIP.AUTO: ABNORMAL
IMM GRANULOCYTES # BLD AUTO: 0.11 THOUSAND/UL (ref 0–0.2)
IMM GRANULOCYTES NFR BLD AUTO: 1 % (ref 0–2)
KETONES UR STRIP-MCNC: NEGATIVE MG/DL
LACTATE SERPL-SCNC: 0.5 MMOL/L (ref 0.5–2)
LEUKOCYTE ESTERASE UR QL STRIP: NEGATIVE
LYMPHOCYTES # BLD AUTO: 1.05 THOUSANDS/ΜL (ref 0.6–4.47)
LYMPHOCYTES NFR BLD AUTO: 7 % (ref 14–44)
MAGNESIUM SERPL-MCNC: 2 MG/DL (ref 1.6–2.6)
MCH RBC QN AUTO: 29.3 PG (ref 26.8–34.3)
MCHC RBC AUTO-ENTMCNC: 32.2 G/DL (ref 31.4–37.4)
MCV RBC AUTO: 91 FL (ref 82–98)
MONOCYTES # BLD AUTO: 1.14 THOUSAND/ΜL (ref 0.17–1.22)
MONOCYTES NFR BLD AUTO: 8 % (ref 4–12)
NEUTROPHILS # BLD AUTO: 12.38 THOUSANDS/ΜL (ref 1.85–7.62)
NEUTS SEG NFR BLD AUTO: 83 % (ref 43–75)
NITRITE UR QL STRIP: NEGATIVE
NON-SQ EPI CELLS URNS QL MICRO: NORMAL /HPF
NRBC BLD AUTO-RTO: 0 /100 WBCS
PH UR STRIP.AUTO: 6.5 [PH]
PHOSPHATE SERPL-MCNC: 5.1 MG/DL (ref 2.3–4.1)
PLATELET # BLD AUTO: 321 THOUSANDS/UL (ref 149–390)
PMV BLD AUTO: 9.4 FL (ref 8.9–12.7)
POTASSIUM SERPL-SCNC: 4.4 MMOL/L (ref 3.5–5.3)
PROCALCITONIN SERPL-MCNC: 0.44 NG/ML
PROT SERPL-MCNC: 8 G/DL (ref 6.4–8.4)
PROT UR STRIP-MCNC: >=300 MG/DL
RBC # BLD AUTO: 3.34 MILLION/UL (ref 3.88–5.62)
RBC #/AREA URNS AUTO: NORMAL /HPF
SODIUM SERPL-SCNC: 134 MMOL/L (ref 135–147)
SP GR UR STRIP.AUTO: 1.01 (ref 1–1.03)
TSH SERPL DL<=0.05 MIU/L-ACNC: 1.12 UIU/ML (ref 0.45–4.5)
URATE SERPL-MCNC: 7.6 MG/DL (ref 3.5–8.5)
UROBILINOGEN UR QL STRIP.AUTO: 0.2 E.U./DL
WBC # BLD AUTO: 14.89 THOUSAND/UL (ref 4.31–10.16)
WBC #/AREA URNS AUTO: NORMAL /HPF

## 2022-07-26 PROCEDURE — 99285 EMERGENCY DEPT VISIT HI MDM: CPT | Performed by: PHYSICIAN ASSISTANT

## 2022-07-26 PROCEDURE — 99219 PR INITIAL OBSERVATION CARE/DAY 50 MINUTES: CPT | Performed by: NURSE PRACTITIONER

## 2022-07-26 PROCEDURE — 87040 BLOOD CULTURE FOR BACTERIA: CPT | Performed by: PHYSICIAN ASSISTANT

## 2022-07-26 PROCEDURE — 80053 COMPREHEN METABOLIC PANEL: CPT | Performed by: PHYSICIAN ASSISTANT

## 2022-07-26 PROCEDURE — 83605 ASSAY OF LACTIC ACID: CPT | Performed by: PHYSICIAN ASSISTANT

## 2022-07-26 PROCEDURE — 99215 OFFICE O/P EST HI 40 MIN: CPT | Performed by: INTERNAL MEDICINE

## 2022-07-26 PROCEDURE — 96367 TX/PROPH/DG ADDL SEQ IV INF: CPT

## 2022-07-26 PROCEDURE — 96375 TX/PRO/DX INJ NEW DRUG ADDON: CPT

## 2022-07-26 PROCEDURE — 74176 CT ABD & PELVIS W/O CONTRAST: CPT

## 2022-07-26 PROCEDURE — 84443 ASSAY THYROID STIM HORMONE: CPT | Performed by: PHYSICIAN ASSISTANT

## 2022-07-26 PROCEDURE — G1004 CDSM NDSC: HCPCS

## 2022-07-26 PROCEDURE — 36415 COLL VENOUS BLD VENIPUNCTURE: CPT | Performed by: PHYSICIAN ASSISTANT

## 2022-07-26 PROCEDURE — 84145 PROCALCITONIN (PCT): CPT | Performed by: PHYSICIAN ASSISTANT

## 2022-07-26 PROCEDURE — 84100 ASSAY OF PHOSPHORUS: CPT | Performed by: PHYSICIAN ASSISTANT

## 2022-07-26 PROCEDURE — 82948 REAGENT STRIP/BLOOD GLUCOSE: CPT

## 2022-07-26 PROCEDURE — NC001 PR NO CHARGE: Performed by: INTERNAL MEDICINE

## 2022-07-26 PROCEDURE — 85025 COMPLETE CBC W/AUTO DIFF WBC: CPT | Performed by: PHYSICIAN ASSISTANT

## 2022-07-26 PROCEDURE — 73030 X-RAY EXAM OF SHOULDER: CPT

## 2022-07-26 PROCEDURE — 86140 C-REACTIVE PROTEIN: CPT | Performed by: NURSE PRACTITIONER

## 2022-07-26 PROCEDURE — 81001 URINALYSIS AUTO W/SCOPE: CPT | Performed by: PHYSICIAN ASSISTANT

## 2022-07-26 PROCEDURE — 99285 EMERGENCY DEPT VISIT HI MDM: CPT

## 2022-07-26 PROCEDURE — 96368 THER/DIAG CONCURRENT INF: CPT

## 2022-07-26 PROCEDURE — 83735 ASSAY OF MAGNESIUM: CPT | Performed by: PHYSICIAN ASSISTANT

## 2022-07-26 PROCEDURE — 76870 US EXAM SCROTUM: CPT

## 2022-07-26 PROCEDURE — 84550 ASSAY OF BLOOD/URIC ACID: CPT | Performed by: NURSE PRACTITIONER

## 2022-07-26 PROCEDURE — 96365 THER/PROPH/DIAG IV INF INIT: CPT

## 2022-07-26 RX ORDER — LEVOFLOXACIN 500 MG/1
500 TABLET, FILM COATED ORAL EVERY OTHER DAY
Qty: 5 TABLET | Refills: 0 | Status: SHIPPED | OUTPATIENT
Start: 2022-07-26 | End: 2022-07-26 | Stop reason: SDUPTHER

## 2022-07-26 RX ORDER — SEVELAMER HYDROCHLORIDE 800 MG/1
800 TABLET, FILM COATED ORAL
Status: DISCONTINUED | OUTPATIENT
Start: 2022-07-26 | End: 2022-07-31 | Stop reason: HOSPADM

## 2022-07-26 RX ORDER — VANCOMYCIN HYDROCHLORIDE 1 G/200ML
10 INJECTION, SOLUTION INTRAVENOUS
Status: DISCONTINUED | OUTPATIENT
Start: 2022-07-27 | End: 2022-07-26

## 2022-07-26 RX ORDER — CARVEDILOL 12.5 MG/1
12.5 TABLET ORAL 2 TIMES DAILY
Status: DISCONTINUED | OUTPATIENT
Start: 2022-07-26 | End: 2022-07-31 | Stop reason: HOSPADM

## 2022-07-26 RX ORDER — INSULIN LISPRO 100 [IU]/ML
1-6 INJECTION, SOLUTION INTRAVENOUS; SUBCUTANEOUS
Status: DISCONTINUED | OUTPATIENT
Start: 2022-07-26 | End: 2022-07-31 | Stop reason: HOSPADM

## 2022-07-26 RX ORDER — KETOROLAC TROMETHAMINE 30 MG/ML
15 INJECTION, SOLUTION INTRAMUSCULAR; INTRAVENOUS ONCE
Status: COMPLETED | OUTPATIENT
Start: 2022-07-26 | End: 2022-07-26

## 2022-07-26 RX ORDER — NIFEDIPINE 30 MG/1
30 TABLET, FILM COATED, EXTENDED RELEASE ORAL 2 TIMES DAILY
Status: DISCONTINUED | OUTPATIENT
Start: 2022-07-26 | End: 2022-07-27 | Stop reason: CLARIF

## 2022-07-26 RX ORDER — ASPIRIN 81 MG/1
324 TABLET, CHEWABLE ORAL AS NEEDED
COMMUNITY
End: 2022-07-31

## 2022-07-26 RX ORDER — ACETAMINOPHEN 325 MG/1
650 TABLET ORAL EVERY 6 HOURS PRN
Status: DISCONTINUED | OUTPATIENT
Start: 2022-07-26 | End: 2022-07-31 | Stop reason: HOSPADM

## 2022-07-26 RX ORDER — ALLOPURINOL 100 MG/1
100 TABLET ORAL DAILY
Status: DISCONTINUED | OUTPATIENT
Start: 2022-07-27 | End: 2022-07-31 | Stop reason: HOSPADM

## 2022-07-26 RX ORDER — INSULIN LISPRO 100 [IU]/ML
1-5 INJECTION, SOLUTION INTRAVENOUS; SUBCUTANEOUS
Status: DISCONTINUED | OUTPATIENT
Start: 2022-07-26 | End: 2022-07-31 | Stop reason: HOSPADM

## 2022-07-26 RX ORDER — HEPARIN SODIUM 5000 [USP'U]/ML
5000 INJECTION, SOLUTION INTRAVENOUS; SUBCUTANEOUS EVERY 8 HOURS SCHEDULED
Status: DISCONTINUED | OUTPATIENT
Start: 2022-07-26 | End: 2022-07-31 | Stop reason: HOSPADM

## 2022-07-26 RX ORDER — ONDANSETRON 2 MG/ML
4 INJECTION INTRAMUSCULAR; INTRAVENOUS EVERY 6 HOURS PRN
Status: DISCONTINUED | OUTPATIENT
Start: 2022-07-26 | End: 2022-07-31 | Stop reason: HOSPADM

## 2022-07-26 RX ORDER — OXYCODONE HYDROCHLORIDE 5 MG/1
5 TABLET ORAL ONCE
Status: COMPLETED | OUTPATIENT
Start: 2022-07-26 | End: 2022-07-26

## 2022-07-26 RX ORDER — CEFEPIME HYDROCHLORIDE 2 G/50ML
2000 INJECTION, SOLUTION INTRAVENOUS ONCE
Status: COMPLETED | OUTPATIENT
Start: 2022-07-26 | End: 2022-07-26

## 2022-07-26 RX ORDER — VANCOMYCIN HYDROCHLORIDE 1 G/200ML
10 INJECTION, SOLUTION INTRAVENOUS ONCE AS NEEDED
Status: COMPLETED | OUTPATIENT
Start: 2022-07-27 | End: 2022-07-27

## 2022-07-26 RX ORDER — ATORVASTATIN CALCIUM 80 MG/1
80 TABLET, FILM COATED ORAL DAILY
Status: DISCONTINUED | OUTPATIENT
Start: 2022-07-27 | End: 2022-07-31 | Stop reason: HOSPADM

## 2022-07-26 RX ORDER — LIDOCAINE 50 MG/G
1 PATCH TOPICAL DAILY
Status: DISCONTINUED | OUTPATIENT
Start: 2022-07-27 | End: 2022-07-31 | Stop reason: HOSPADM

## 2022-07-26 RX ORDER — OXYCODONE HYDROCHLORIDE 5 MG/1
5 TABLET ORAL EVERY 6 HOURS PRN
Status: DISCONTINUED | OUTPATIENT
Start: 2022-07-26 | End: 2022-07-27

## 2022-07-26 RX ORDER — CLOPIDOGREL BISULFATE 75 MG/1
75 TABLET ORAL DAILY
Status: DISCONTINUED | OUTPATIENT
Start: 2022-07-27 | End: 2022-07-31 | Stop reason: HOSPADM

## 2022-07-26 RX ADMIN — KETOROLAC TROMETHAMINE 15 MG: 30 INJECTION, SOLUTION INTRAMUSCULAR at 17:35

## 2022-07-26 RX ADMIN — CEFEPIME HYDROCHLORIDE 2000 MG: 2 INJECTION, SOLUTION INTRAVENOUS at 09:53

## 2022-07-26 RX ADMIN — Medication 1500 MG: at 10:57

## 2022-07-26 RX ADMIN — HEPARIN SODIUM 5000 UNITS: 5000 INJECTION INTRAVENOUS; SUBCUTANEOUS at 17:35

## 2022-07-26 RX ADMIN — CARVEDILOL 12.5 MG: 12.5 TABLET, FILM COATED ORAL at 17:35

## 2022-07-26 RX ADMIN — KETOROLAC TROMETHAMINE 15 MG: 30 INJECTION, SOLUTION INTRAMUSCULAR at 10:15

## 2022-07-26 RX ADMIN — OXYCODONE HYDROCHLORIDE 5 MG: 5 TABLET ORAL at 20:36

## 2022-07-26 RX ADMIN — SODIUM CHLORIDE, POTASSIUM CHLORIDE, SODIUM LACTATE AND CALCIUM CHLORIDE 250 ML: 600; 310; 30; 20 INJECTION, SOLUTION INTRAVENOUS at 10:00

## 2022-07-26 RX ADMIN — OXYCODONE HYDROCHLORIDE 5 MG: 5 TABLET ORAL at 10:16

## 2022-07-26 NOTE — ED PROVIDER NOTES
History  Chief Complaint   Patient presents with    Testicle Pain    Shoulder Pain     Patient is a 79-year-old male with past medical history significant for end-stage renal dialysis Tuesday Thursday Saturday schedule with temporary dialysis catheter access, hypertension, NIDDM2, hyperlipidemia, CAD, who presents today for evaluation of right shoulder and left testicular pain  Patient was recently admitted to this hospital for evaluation of hip pain and discharge home  He states that he went to an urgent care facility regarding his testicular pain and they arranged for him to receive cefepime with his dialysis treatments for treatment of presumed epididymitis  His 1st dose of cefepime was to begin today  He also complains of right shoulder pain  He states that this pain is consistent with his usual gout flare  He contacted his PCP for refill of his oxycodone who told him that he would need to be re-evaluated to renew the prescription  He states he was unable to attend [facility] HD today secondary to testicular and shoulder pain  Testicle Pain  Location:  Left testicle  Quality:  Throbbing  Severity:  Severe  Onset quality:  Gradual  Duration:  2 weeks  Timing:  Constant  Progression:  Worsening  Chronicity:  New  Context: With movement and palpation  Relieved by:  Nothing  Worsened by: Movement and palpation  Ineffective treatments:  None tried  Associated symptoms: no abdominal pain, no chest pain, no congestion, no cough, no diarrhea, no ear pain, no fatigue, no fever, no headaches, no loss of consciousness, no myalgias, no nausea, no rash, no rhinorrhea, no shortness of breath, no sore throat, no vomiting and no wheezing        Prior to Admission Medications   Prescriptions Last Dose Informant Patient Reported? Taking?    NIFEdipine ER (ADALAT CC) 30 MG 24 hr tablet   Yes No   Sig: Take 30 mg by mouth 2 (two) times a day   acetaminophen (TYLENOL) 325 mg tablet   No No   Sig: Take 2 tablets (650 mg total) by mouth every 6 (six) hours as needed for mild pain, headaches or fever   allopurinol (ZYLOPRIM) 100 mg tablet   Yes No   Sig: Take 100 mg by mouth daily   atorvastatin (LIPITOR) 80 mg tablet   Yes No   Sig: Take 80 mg by mouth daily   carvedilol (COREG) 6 25 mg tablet   Yes No   Sig: Take 12 5 mg by mouth 2 (two) times a day   clopidogrel (PLAVIX) 75 mg tablet   Yes No   Sig: Take 75 mg by mouth daily   lidocaine (LIDODERM) 5 %   No No   Sig: Apply 1 patch topically daily Lower back - Remove & Discard patch within 12 hours or as directed by MD   linaGLIPtin 5 MG TABS   No No   Sig: Take 5 mg by mouth in the morning   naloxone (NARCAN) 4 mg/0 1 mL nasal spray   No No   Sig: Administer 1 spray into a nostril  If no response after 2-3 minutes, give another dose in the other nostril using a new spray  oxyCODONE (ROXICODONE) 5 immediate release tablet   No No   Sig: Take 1 tablet (5 mg total) by mouth every 8 (eight) hours as needed for severe pain or moderate pain for up to 10 doses Max Daily Amount: 15 mg   sevelamer (RENAGEL) 800 mg tablet   No No   Sig: Take 1 tablet (800 mg total) by mouth 3 (three) times a day with meals      Facility-Administered Medications: None       Past Medical History:   Diagnosis Date    CKD     [facility]     Hypertension     Left toe amputee ([facility] )     TIA (transient ischemic attack)        Past Surgical History:   Procedure Laterality Date    IR TUNNELED DIALYSIS CATHETER PLACEMENT  1/27/2022       History reviewed  No pertinent family history  I have reviewed and agree with the history as documented      E-Cigarette/Vaping    E-Cigarette Use Never User      E-Cigarette/Vaping Substances     Social History     Tobacco Use    Smoking status: Never Smoker    Smokeless tobacco: Never Used   Vaping Use    Vaping Use: Never used   Substance Use Topics    Alcohol use: Not Currently     Alcohol/week: 0 0 standard drinks     Comment: 0    Drug use: Never Review of Systems   Constitutional: Negative for chills, fatigue and fever  HENT: Negative for congestion, ear pain, rhinorrhea and sore throat  Eyes: Negative for pain and visual disturbance  Respiratory: Negative for cough, shortness of breath and wheezing  Cardiovascular: Negative for chest pain and palpitations  Gastrointestinal: Negative for abdominal pain, diarrhea, nausea and vomiting  Endocrine: Negative  Genitourinary: Positive for testicular pain  Negative for dysuria and hematuria  Musculoskeletal: Positive for arthralgias and joint swelling  Negative for back pain and myalgias  Skin: Negative for color change and rash  Allergic/Immunologic: Negative  Neurological: Negative for seizures, loss of consciousness, syncope and headaches  Hematological: Negative  Psychiatric/Behavioral: Negative  All other systems reviewed and are negative  Physical Exam  Physical Exam  Vitals and nursing note reviewed  Exam conducted with a chaperone present  Constitutional:       Appearance: He is well-developed  He is ill-appearing  He is not diaphoretic  HENT:      Head: Normocephalic and atraumatic  Right Ear: Tympanic membrane, ear canal and external ear normal       Left Ear: Tympanic membrane, ear canal and external ear normal       Nose: Nose normal       Mouth/Throat:      Mouth: Mucous membranes are moist    Eyes:      General: No scleral icterus  Conjunctiva/sclera: Conjunctivae normal       Pupils: Pupils are equal, round, and reactive to light  Cardiovascular:      Rate and Rhythm: Normal rate and regular rhythm  Pulses: Normal pulses  Heart sounds: No murmur heard  Pulmonary:      Effort: Pulmonary effort is normal  No respiratory distress  Breath sounds: Normal breath sounds  Abdominal:      General: Abdomen is flat  Bowel sounds are normal  There is no distension  Palpations: Abdomen is soft  Tenderness:  There is no abdominal tenderness  Genitourinary:     Pubic Area: No rash  Penis: Normal and circumcised  No erythema or discharge  Testes: Cremasteric reflex is present  Right: Mass, tenderness, swelling, testicular hydrocele or varicocele not present  Right testis is descended  Cremasteric reflex is present  Left: Tenderness and swelling present  Mass not present  Musculoskeletal:      Right shoulder: Tenderness present  Normal pulse  Left shoulder: Normal       Cervical back: Normal range of motion and neck supple  No rigidity  Skin:     General: Skin is warm and dry  Capillary Refill: Capillary refill takes less than 2 seconds  Neurological:      General: No focal deficit present  Mental Status: He is alert and oriented to person, place, and time  Cranial Nerves: No cranial nerve deficit     Psychiatric:         Mood and Affect: Mood normal          Vital Signs  ED Triage Vitals [07/26/22 0815]   Temperature Pulse Respirations Blood Pressure SpO2   100 °F (37 8 °C) 75 18 139/65 95 %      Temp Source Heart Rate Source Patient Position - Orthostatic VS BP Location FiO2 (%)   Oral Monitor Sitting Right arm --      Pain Score       5           Vitals:    07/26/22 0815 07/26/22 1233   BP: 139/65 123/59   Pulse: 75 71   Patient Position - Orthostatic VS: Sitting          Visual Acuity  Visual Acuity    Flowsheet Row Most Recent Value   L Pupil Size (mm) 3   R Pupil Size (mm) 3          ED Medications  Medications   epoetin jessica (EPOGEN,PROCRIT) injection 800 Units (has no administration in time range)   insulin lispro (HumaLOG) 100 units/mL subcutaneous injection 1-6 Units (has no administration in time range)   insulin lispro (HumaLOG) 100 units/mL subcutaneous injection 1-5 Units (has no administration in time range)   vancomycin (VANCOCIN) IVPB (premix in dextrose) 1,000 mg 200 mL (has no administration in time range)   ketorolac (TORADOL) injection 15 mg (15 mg Intravenous Given 7/26/22 1015)   lactated ringers bolus 250 mL (0 mL Intravenous Stopped 7/26/22 1056)   oxyCODONE (ROXICODONE) IR tablet 5 mg (5 mg Oral Given 7/26/22 1016)   cefepime (MAXIPIME) IVPB (premix in dextrose) 2,000 mg 50 mL (0 mg Intravenous Stopped 7/26/22 1034)   vancomycin (VANCOCIN) 1500 mg in sodium chloride 0 9% 250 mL IVPB (0 mg Intravenous Stopped 7/26/22 1235)       Diagnostic Studies  Results Reviewed     Procedure Component Value Units Date/Time    C-reactive protein [[id number]]  (Abnormal) Collected: 07/26/22 0918    Lab Status: Final result Specimen: Blood from Arm, Left Updated: 07/26/22 1437      2 mg/L     Blood culture [[id number]] Collected: 07/26/22 0948    Lab Status: Preliminary result Specimen: Blood from Arm, Left Updated: 07/26/22 1304     Blood Culture Received in Microbiology Lab  Culture in Progress  Blood culture [[id number]] Collected: 07/26/22 0948    Lab Status: Preliminary result Specimen: Blood from Arm, Right Updated: 07/26/22 1303     Blood Culture Received in Microbiology Lab  Culture in Progress      Procalcitonin [[id number]]  (Abnormal) Collected: 07/26/22 0918    Lab Status: Final result Specimen: Blood from Arm, Left Updated: 07/26/22 1114     Procalcitonin 0 44 ng/ml     Urine Microscopic [[id number]] Collected: 07/26/22 1036    Lab Status: Final result Specimen: Urine, Clean Catch Updated: 07/26/22 1054     RBC, UA 0-1 /hpf      WBC, UA 0-1 /hpf      Epithelial Cells None Seen /hpf      Bacteria, UA Occasional /hpf      AMORPH URATES Occasional /hpf     UA w Reflex to Microscopic w Reflex to Culture [[id number]]  (Abnormal) Collected: 07/26/22 1036    Lab Status: Final result Specimen: Urine, Clean Catch Updated: 07/26/22 1047     Color, UA Yellow     Clarity, UA Clear     Specific Gravity, UA 1 015     pH, UA 6 5     Leukocytes, UA Negative     Nitrite, UA Negative     Protein, UA >=300 mg/dl      Glucose,  (1/10%) mg/dl      Ketones, UA Negative mg/dl      Urobilinogen, UA 0 2 E U /dl      Bilirubin, UA Negative     Occult Blood, UA Trace-Intact    TSH [[id number]]  (Normal) Collected: 07/26/22 0918    Lab Status: Final result Specimen: Blood from Arm, Left Updated: 07/26/22 0950     TSH 3RD GENERATON 1 125 uIU/mL     Narrative:      Patients undergoing fluorescein dye angiography may retain small amounts of fluorescein in the body for 48-72 hours post procedure  Samples containing fluorescein can produce falsely depressed TSH values  If the patient had this procedure,a specimen should be resubmitted post fluorescein clearance  Magnesium [[id number]]  (Normal) Collected: 07/26/22 0918    Lab Status: Final result Specimen: Blood from Arm, Left Updated: 07/26/22 0950     Magnesium 2 0 mg/dL     Phosphorus [[id number]]  (Abnormal) Collected: 07/26/22 0918    Lab Status: Final result Specimen: Blood from Arm, Left Updated: 07/26/22 0950     Phosphorus 5 1 mg/dL     Lactic acid [[id number]]  (Normal) Collected: 07/26/22 0918    Lab Status: Final result Specimen: Blood from Arm, Left Updated: 07/26/22 0945     LACTIC ACID 0 5 mmol/L     Narrative:      Result may be elevated if tourniquet was used during collection      Comprehensive metabolic panel [[id number]]  (Abnormal) Collected: 07/26/22 0918    Lab Status: Final result Specimen: Blood from Arm, Left Updated: 07/26/22 0942     Sodium 134 mmol/L      Potassium 4 4 mmol/L      Chloride 95 mmol/L      CO2 26 mmol/L      ANION GAP 13 mmol/L      BUN 52 mg/dL      Creatinine 7 84 mg/dL      Glucose 182 mg/dL      Calcium 8 6 mg/dL      Corrected Calcium 9 4 mg/dL      AST 16 U/L      ALT 19 U/L      Alkaline Phosphatase 105 U/L      Total Protein 8 0 g/dL      Albumin 3 0 g/dL      Total Bilirubin 0 22 mg/dL      eGFR 6 ml/min/1 73sq m     Narrative:      [place] guidelines for Chronic Kidney Disease (CKD):     Stage 1 with normal or high GFR (GFR > 90 mL/min/1 73 square meters)   Stage 2 Mild CKD (GFR = 60-89 mL/min/1 73 square meters)    Stage 3A Moderate CKD (GFR = 45-59 mL/min/1 73 square meters)    Stage 3B Moderate CKD (GFR = 30-44 mL/min/1 73 square meters)    Stage 4 Severe CKD (GFR = 15-29 mL/min/1 73 square meters)    Stage 5 End Stage CKD (GFR <15 mL/min/1 73 square meters)  Note: GFR calculation is accurate only with a steady state creatinine    CBC and differential [[id number]]  (Abnormal) Collected: 07/26/22 0918    Lab Status: Final result Specimen: Blood from Arm, Left Updated: 07/26/22 0924     WBC 14 89 Thousand/uL      RBC 3 34 Million/uL      Hemoglobin 9 8 g/dL      Hematocrit 30 4 %      MCV 91 fL      MCH 29 3 pg      MCHC 32 2 g/dL      RDW 15 1 %      MPV 9 4 fL      Platelets 188 Thousands/uL      nRBC 0 /100 WBCs      Neutrophils Relative 83 %      Immat GRANS % 1 %      Lymphocytes Relative 7 %      Monocytes Relative 8 %      Eosinophils Relative 1 %      Basophils Relative 0 %      Neutrophils Absolute 12 38 Thousands/µL      Immature Grans Absolute 0 11 Thousand/uL      Lymphocytes Absolute 1 05 Thousands/µL      Monocytes Absolute 1 14 Thousand/µL      Eosinophils Absolute 0 19 Thousand/µL      Basophils Absolute 0 02 Thousands/µL                  CT abdomen pelvis wo contrast   Final Result by Karon Holter, MD (07/26 1803)   1  No acute inflammatory or infectious process  LV prominent left inguinal lymph nodes are identified similar to prior examination without worrisome features  Similar and also stable para-aortic lymph nodes are identified  2   Prostatomegaly  3   Renal cysts, stable  4   Stable T10 vertebral compression fracture  Workstation performed: [id number]         US scrotum and testicles   Final Result by Ramon Nowak MD (07/26 8124)       No acute abnormality        Workstation performed: [id number]         XR shoulder 2+ vw right    (Results Pending)              Procedures  Procedures         ED Course  ED Course as of 07/26/22 1444   Tue Jul 26, 2022   8412 Temperature: 100 °F (37 8 °C)   0933 WBC(!): 14 89   0941 Straight cath 500cc     0942 Scrotal US without acute pathology     1121 Procalcitonin(!): 0 44  Procal elevated                                               MDM  Number of Diagnoses or Management Options  Epididymo-orchitis, acute: new and requires workup  ESRD (end stage renal disease) ([place] [address] ): new and requires workup  Leukocytosis, unspecified type: new and requires workup  Diagnosis management comments: Patient with possible epididymitis  Ultrasound and CT negative for acute pathology  Laboratory evaluation reveals new white count-patient has received steroids recently but on discharge white count was normal  Patient will be admitted for sepsis evaluation although no obvious source at this time-patient does have indwelling PermCath       Amount and/or Complexity of Data Reviewed  Clinical lab tests: ordered and reviewed  Tests in the radiology section of CPT®: ordered and reviewed  Tests in the medicine section of CPT®: ordered and reviewed  Decide to obtain previous medical records or to obtain history from someone other than the patient: yes  Review and summarize past medical records: yes  Independent visualization of images, tracings, or specimens: yes    Risk of Complications, Morbidity, and/or Mortality  Presenting problems: moderate  Diagnostic procedures: moderate  Management options: moderate    Patient Progress  Patient progress: stable      Disposition  Final diagnoses:   ESRD (end stage renal disease) ([place] [address] )   Epididymo-orchitis, acute   Leukocytosis, unspecified type     Time reflects when diagnosis was documented in both MDM as applicable and the Disposition within this note     Time User Action Codes Description Comment    7/26/2022  9:41 AM Darrelyn Nab Add [N18 6] ESRD (end stage renal disease) ([place] [address] )     7/26/2022 10:47 AM Darrelyn Nab Add [N45 3] Epididymo-orchitis, acute 7/26/2022 11:32 AM Franchesca Chahal Add [D72 829] Leukocytosis, unspecified type       ED Disposition     ED Disposition   Admit    Condition   Stable    Date/Time   Tue Jul 26, 2022 11:31 AM    Comment   Case was discussed with JANIA and the patient's admission status was agreed to be Admission Status: observation status to the service of Dr Conrado Krishnan   Follow-up Information    None         Current Discharge Medication List      CONTINUE these medications which have NOT CHANGED    Details   acetaminophen (TYLENOL) 325 mg tablet Take 2 tablets (650 mg total) by mouth every 6 (six) hours as needed for mild pain, headaches or fever  Refills: 0    Associated Diagnoses: T10 vertebral fracture (HCC)      allopurinol (ZYLOPRIM) 100 mg tablet Take 100 mg by mouth daily      atorvastatin (LIPITOR) 80 mg tablet Take 80 mg by mouth daily      carvedilol (COREG) 6 25 mg tablet Take 12 5 mg by mouth 2 (two) times a day      clopidogrel (PLAVIX) 75 mg tablet Take 75 mg by mouth daily      lidocaine (LIDODERM) 5 % Apply 1 patch topically daily Lower back - Remove & Discard patch within 12 hours or as directed by MD  Refills: 0    Associated Diagnoses: T10 vertebral fracture (HCC)      linaGLIPtin 5 MG TABS Take 5 mg by mouth in the morning  Refills: 0    Associated Diagnoses: Diabetes mellitus (HCC)      naloxone (NARCAN) 4 mg/0 1 mL nasal spray Administer 1 spray into a nostril  If no response after 2-3 minutes, give another dose in the other nostril using a new spray    Qty: 1 each, Refills: 0    Associated Diagnoses: Left hip pain      NIFEdipine ER (ADALAT CC) 30 MG 24 hr tablet Take 30 mg by mouth 2 (two) times a day      oxyCODONE (ROXICODONE) 5 immediate release tablet Take 1 tablet (5 mg total) by mouth every 8 (eight) hours as needed for severe pain or moderate pain for up to 10 doses Max Daily Amount: 15 mg  Qty: 10 tablet, Refills: 0    Associated Diagnoses: T10 vertebral fracture (HCC)      sevelamer (RENAGEL) 800 mg tablet Take 1 tablet (800 mg total) by mouth 3 (three) times a day with meals  Refills: 0    Associated Diagnoses: ESR[facility] (end stage renal disease) ([facility]             No discharge procedures on file      PDMP Review       Value Time User    PDMP Reviewed  Yes 7/26/2022  1:57 PM Mike Salas          ED Provider  Electronically Signed by           Ofilia Harada, PA-C  07/26/22 4650

## 2022-07-26 NOTE — ASSESSMENT & PLAN NOTE
Pain in left testicle present on admission  Patient reports that he has been experiencing this discomfort off and on for several weeks  Indicated that he saw a new doctor in [place], felt that patient had epididymitis and prescribed IV cefepime with dialysis sessions with an end date of 08/06/2022  Ultrasound of scrotum and testicles performed with no acute abnormality as per report  CT abdomen pelvis showed no acute inflammatory in her infectious process, prostatomegaly a, stable renal cyst, stable T10 vertebral compression fracture  WBC elevated 14 89  Patient reports fever at home  UA shows occasional bacteria, positive protein and glucose  Blood cultures x2 drawn, follow-up on results  He did receive IV cefepime and IV vancomycin in the emergency department    Procalcitonin mildly elevated 0 44  Repeat procalcitonin in am  Continue renally dosed vancomycin  Pharmacy consulted for vancomycin management  Monitor fever and WBC curve

## 2022-07-26 NOTE — ASSESSMENT & PLAN NOTE
Patient has a history of hypertension  Will continue with home medication nifedipine 30 mg b i d , Coreg 6 25 mg b i d  Singh [place]   Blood pressure currently 123/59  Vital signs as per unit routine

## 2022-07-26 NOTE — H&P
Bharat 1960, 60 y o  male MRN: [id number]  Unit/Bed#: ED 14 Encounter: [id number]  Primary Care Provider: Sierra Swenson MD   Date and time admitted to hospital: 7/26/2022  8:11 AM    * Right shoulder pain  Assessment & Plan  Right shoulder pain present on admission, patient reports that it has been intermittent over the past week  When he woke up this morning the pain was very intense, indicated that it felt like a gout flare  He felt he was unable to drive his car, summoned EMS and presented to emergency department for further evaluation treatment  He was given IV Toradol x1 in the ED and reported significant improvement in pain  Patient was able to demonstrate full range of motion  Will obtain shoulder x-ray for completeness  Also will add on uric acid  If elevated may consider prednisone or colchicine, patient indicates that both of these have helped in the past     Pain in left testicle  Assessment & Plan  Pain in left testicle present on admission  Patient reports that he has been experiencing this discomfort off and on for several weeks  Indicated that he saw a new doctor in [place], felt that patient had epididymitis and prescribed IV cefepime with dialysis sessions with an end date of 08/06/2022  Ultrasound of scrotum and testicles performed with no acute abnormality as per report  CT abdomen pelvis showed no acute inflammatory in her infectious process, prostatomegaly a, stable renal cyst, stable T10 vertebral compression fracture  WBC elevated 14 89  Patient reports fever at home  UA shows occasional bacteria, positive protein and glucose  Blood cultures x2 drawn, follow-up on results  He did receive IV cefepime and IV vancomycin in the emergency department    Procalcitonin mildly elevated 0 44  Repeat procalcitonin in am  Continue renally dosed vancomycin  Pharmacy consulted for vancomycin management  Monitor fever and WBC curve      Leukocytosis  Assessment & Plan  Leukocytosis present on admission as evidence by WBC 14 89  Patient does admit fever at home  Source of infection currently on clear  Procalcitonin mildly elevated at 0 44, will repeat in a m  Blood cultures x2 drawn  Patient did receive IV vancomycin and cefepime in the emergency department  Will continue with renally dosed vancomycin and pharmacy consult for management  Monitor fever curve and CBC  ESRD (end stage renal disease) [facility])  Assessment & Plan  Lab Results   Component Value Date    EGFR 6 07/26/2022    EGFR 7 07/12/2022    EGFR 8 07/11/2022    CREATININE 7 84 (H) 07/26/2022    CREATININE 6 82 (H) 07/12/2022    CREATININE 6 65 (H) 07/11/2022     Patient follows with [facility] outpatient, follows a [facility]-D[facility]al[facility]-Bmp[facility] schedule  He missed his dialysis today secondary to experiencing severe pain and presenting to ER  Nephrology consulted, indicated that patient will have dialysis 7/27  He has a tunneled catheter present in his right anterior chest wall  Repeat BMP in a m  Tyshawn Guerrero Continue patient's Renagel 800 mg t i d  With meals  Type 2 diabetes mellitus, without long-term current use of insulin ([facility])  Assessment & Plan  Lab Results   Component Value Date    HGBA1C 6 6 (H) 07/07/2022       No results for input(s): POCGLU in the last 72 hours  Blood Sugar Average: Last 72 hrs:     Patient normally takes linagliptin 5 mg p o  Daily, reported that he has not taken it recently as he did not have the medication  Will substitute with Accu-Cheks AC and HS with sliding scale  Adjust scale accordingly    Hypertension  Assessment & Plan  Patient has a history of hypertension  Will continue with home medication nifedipine 30 mg b i d , Coreg 6 25 mg b i d  Tyshawn Guerrero   Blood pressure currently 123/59  Vital signs as per unit routine       VTE Prophylaxis: Heparin  / sequential compression device   Code Status: Prior    Anticipated Length of Stay: Patient will be admitted on an Observation basis with an anticipated length of stay of  Less than 2 midnights  Justification for Hospital Stay: IV abx, follow up blood cultures, repeat CBC, hemodialysis in am     Total Time for Visit, including Counseling / Coordination of Care: 1 hour  Greater than 50% of this total time spent on direct patient counseling and coordination of care  Chief Complaint:   Testicle Pain and Shoulder Pain      History of Present Illness:    Fabby Knutson is a 60 y o  male with a PMH of ESRD on hemodialysis Tuesday/Thursday/Saturday, hypertension, diabetes, hyperlipidemia, gout, TIA and amputation of left 2nd toe who presents with right shoulder pain and left scrotal pain  Patient reports that he woke up this morning and had significant right shoulder pain and left scrotal pain  He reports that scrotal pain has been intermittent for the past few weeks, had been seen by a doctor in the [facility] area and was told by that physician that he may have  epididymitis and had prescribed patient to get IV cefepime with his dialysis treatments  Patient was to start that regimen today however the pain was so significant that he felt he was unable to drive and summoned EMS and presented to emergency department for further evaluation treatment  In the ED labs were significant for WBC elevated 14 89, BUN 52 creatinine 7 84  Procalcitonin mildly elevated 0 44, lactic acid negative  UA showed occasional bacteria, positive protein and glucose  CT of the abdomen pelvis showed no acute inflammatory or infectious process  Ultrasound of scrotum and testicles was performed which showed no acute abnormality  Blood cultures were drawn  The patient received IV cefepime and vancomycin in the ED  Nephrology was consulted, saw patient in the emergency department indicated that patient will undergo hemodialysis tomorrow    Will continue with renally dosed vancomycin, pharmacy is consulted for vancomycin management  Will follow-up on blood cultures, repeat procalcitonin and CBC in a m     This was discussed with the patient at the bedside, he verbalized understanding and is agreeable to the plan  Code status was also discussed with patient at the bedside and he is to be a full code  Review of Systems:    Review of Systems   Constitutional: Positive for fever  HENT: Negative  Eyes: Negative  Respiratory: Negative  Cardiovascular: Negative  Gastrointestinal: Negative for constipation and diarrhea  Genitourinary: Positive for testicular pain  Negative for difficulty urinating, penile swelling and scrotal swelling  Left testicular pain   Musculoskeletal: Positive for arthralgias and back pain  Right shoulder pain   Skin: Negative  Allergic/Immunologic: Negative  Neurological: Negative for dizziness and headaches  Hematological: Negative  Psychiatric/Behavioral: Negative  Past Medical and Surgical History:     Past Medical History:   Diagnosis Date    CKD     Diabetes mellitus ([facility] )     Hypertension     Left toe amputee ([facility] )     TIA (transient ischemic attack)        Past Surgical History:   Procedure Laterality Date    IR TUNNELED DIALYSIS CATHETER PLACEMENT  1/27/2022       Meds/Allergies:    Prior to Admission medications    Medication Sig Start Date End Date Taking?  Authorizing Provider   levofloxacin (LEVAQUIN) 500 mg tablet Take 1 tablet (500 mg total) by mouth every other day for 10 days Take every other day after dialysis for ten days 7/26/22 7/26/22 Yes Yahir Lozano PA-C   acetaminophen (TYLENOL) 325 mg tablet Take 2 tablets (650 mg total) by mouth every 6 (six) hours as needed for mild pain, headaches or fever 2/2/22   Lore Silver DO   allopurinol (ZYLOPRIM) 100 mg tablet Take 100 mg by mouth daily 11/2/21 7/6/22  Historical Provider, MD   atorvastatin (LIPITOR) 80 mg tablet Take 80 mg by mouth daily 11/2/21 1/31/22 Historical Provider, MD   carvedilol (COREG) 6 25 mg tablet Take 12 5 mg by mouth 2 (two) times a day 11/16/21   Historical Provider, MD   clopidogrel (PLAVIX) 75 mg tablet Take 75 mg by mouth daily 11/2/21   Historical Provider, MD   lidocaine (LIDODERM) 5 % Apply 1 patch topically daily Lower back - Remove & Discard patch within 12 hours or as directed by MD 2/3/22   Lore Silver DO   linaGLIPtin 5 MG TABS Take 5 mg by mouth in the morning 2/2/22   Lore Silver DO   naloxone (NARCAN) 4 mg/0 1 mL nasal spray Administer 1 spray into a nostril  If no response after 2-3 minutes, give another dose in the other nostril using a new spray  7/16/22   Lore Silver DO   NIFEdipine ER (ADALAT CC) 30 MG 24 hr tablet Take 30 mg by mouth 2 (two) times a day 11/15/21   Historical Provider, MD   oxyCODONE (ROXICODONE) 5 immediate release tablet Take 1 tablet (5 mg total) by mouth every 8 (eight) hours as needed for severe pain or moderate pain for up to 10 doses Max Daily Amount: 15 mg 7/16/22   Lore Silver DO   sevelamer (RENAGEL) 800 mg tablet Take 1 tablet (800 mg total) by mouth 3 (three) times a day with meals 2/2/22   Lore Silver DO       Allergies: No Known Allergies    Social History:     Marital Status:    Substance Use History:   Social History     Substance and Sexual Activity   Alcohol Use Not Currently    Alcohol/week: 0 0 standard drinks    Comment: 0     Social History     Tobacco Use   Smoking Status Never Smoker   Smokeless Tobacco Never Used     Social History     Substance and Sexual Activity   Drug Use Never       Family History:    History reviewed  No pertinent family history      Physical Exam:     Vitals:   Blood Pressure: 123/59 (07/26/22 1233)  Pulse: 71 (07/26/22 1233)  Temperature: 98 3 °F (36 8 °C) (07/26/22 1233)  Temp Source: Tympanic (07/26/22 1233)  Respirations: 18 (07/26/22 1233)  Weight - Scale: 106 kg (233 lb) (07/26/22 0815)  SpO2: 95 % (07/26/22 1233)    Physical Exam  Vitals and nursing note reviewed  Constitutional:       General: He is not in acute distress  Appearance: He is not ill-appearing  HENT:      Head: Normocephalic  Nose: Nose normal       Mouth/Throat:      Mouth: Mucous membranes are moist    Eyes:      Extraocular Movements: Extraocular movements intact  Conjunctiva/sclera: Conjunctivae normal       Pupils: Pupils are equal, round, and reactive to light  Cardiovascular:      Rate and Rhythm: Normal rate and regular rhythm  Pulses: Normal pulses  Pulmonary:      Effort: Pulmonary effort is normal       Breath sounds: Normal breath sounds  Abdominal:      General: Bowel sounds are normal  There is no distension  Palpations: Abdomen is soft  Tenderness: There is no abdominal tenderness  Genitourinary:     Comments: No swelling  Musculoskeletal:         General: Normal range of motion  Cervical back: Normal range of motion  Comments: Reports right shoulder pain improved after getting toradol  Left second toe amputated; well healed  Skin:     General: Skin is warm and dry  Capillary Refill: Capillary refill takes less than 2 seconds  Neurological:      General: No focal deficit present  Mental Status: He is alert and oriented to person, place, and time  Psychiatric:         Mood and Affect: Mood normal          Behavior: Behavior normal          Thought Content: Thought content normal          Judgment: Judgment normal            Additional Data:     Lab Results: I have personally reviewed pertinent reports        Results from last 7 days   Lab Units 07/26/22  0918   WBC Thousand/uL 14 89*   HEMOGLOBIN g/dL 9 8*   HEMATOCRIT % 30 4*   PLATELETS Thousands/uL 321   NEUTROS PCT % 83*     Results from last 7 days   Lab Units 07/26/22  0918   SODIUM mmol/L 134*   POTASSIUM mmol/L 4 4   CHLORIDE mmol/L 95*   CO2 mmol/L 26   BUN mg/dL 52*   CREATININE mg/dL 7 84*   CALCIUM mg/dL 8 6   TOTAL BILIRUBIN mg/dL 0 22   ALK PHOS U/L 105   ALT U/L 19   AST U/L 16                 Results from last 7 days   Lab Units 07/26/22  0918   LACTIC ACID mmol/L 0 5       Imaging: I have personally reviewed pertinent reports  CT abdomen pelvis wo contrast   Final Result by Jessenia Daley MD (07/26 5211)   1  No acute inflammatory or infectious process  LV prominent left inguinal lymph nodes are identified similar to prior examination without worrisome features  Similar and also stable para-aortic lymph nodes are identified  2   Prostatomegaly  3   Renal cysts, stable  4   Stable T10 vertebral compression fracture  Workstation performed: [id number]         US scrotum and testicles   Final Result by Ron Aviles MD (07/26 5515)       No acute abnormality  Workstation performed: [id number]         XR shoulder 2+ vw right    (Results Pending)       CT abdomen pelvis wo contrast   Final Result   1  No acute inflammatory or infectious process  LV prominent left inguinal lymph nodes are identified similar to prior examination without worrisome features  Similar and also stable para-aortic lymph nodes are identified  2   Prostatomegaly  3   Renal cysts, stable  4   Stable T10 vertebral compression fracture  Workstation performed: [id number]         US scrotum and testicles   Final Result       No acute abnormality  Workstation performed: [id number]         XR shoulder 2+ vw right    (Results Pending)       EKG, Pathology, and Other Studies Reviewed on Admission:   · EKG: None    Allscripts / Epic Records Reviewed: Yes     ** Please Note: This note has been constructed using a voice recognition system   **

## 2022-07-26 NOTE — ASSESSMENT & PLAN NOTE
Lab Results   Component Value Date    EGFR 6 07/26/2022    EGFR 7 07/12/2022    EGFR 8 07/11/2022    CREATININE 7 84 (H) 07/26/2022    CREATININE 6 82 (H) 07/12/2022    CREATININE 6 65 (H) 07/11/2022     Patient follows with [facility] outpatient, follows a [facility]fdur[facility] schedule  He missed his dialysis today secondary to experiencing severe pain and presenting to ER  Nephrology consulted, indicated that patient will have dialysis 7/27  He has a tunneled catheter present in his right anterior chest wall  Repeat BMP in a m Karrie Nissen Continue patient's Renagel 800 mg t i d  With meals

## 2022-07-26 NOTE — CONSULTS
[address] Balaji 60 y o  male MRN: [id number]  Unit/Bed#: ED 14 Encounter: [id number]    ASSESSMENT and PLAN:  Rosi Levin is a 60 y o  male who was admitted to [facility] after presenting with left testicular pain and left shoulder pain  A renal consultation is requested today for assistance in the management of ESRD  End-stage renal disease on hemodialysis  - Schedule Tuesday/Thursday/Saturday  - Electrolytes and volume status stable today  - Plan next hemodialysis session tomorrow  - Access:  Right chest wall PermCath    Left testicular pain  - Unclear etiology, questionable epididymitis  - Ultrasound of scrotum with no acute abnormality  - CT abdomen with no acute inflammatory infectious processes, prominent left inguinal lymph nodes as identified before  - Recently started on IV cefepime 1 g 3 times a week with dialysis  - Start date 07/23/2022, end date 08/06/2022 for outpatient dialysis unit    Leukocytosis  - Need to rule out bacteremia in setting of under dialysis catheter   - Agree with checking blood cultures  - Agree with broad-spectrum antibiotics at this time    Hypertension/volume  - Continue home antihypertensive regimen of Coreg and nifedipine  - We will aim for dry weight of 104 kg on dialysis    Anemia  - Goal hemoglobin 10-11  - Continue Epogen 800 units with each session of dialysis  Lab Results   Component Value Date    HGB 9 8 (L) 07/26/2022     Electrolytes/acid base  - Stable  Lab Results   Component Value Date    K 4 4 07/26/2022    CL 95 (L) 07/26/2022    CO2 26 07/26/2022     Bone mineral disease  - Goal serum phosphate less than 5 5  - Continue Renvela 3 tablets t i d   With meals  - We will give IV Hectorol 1 5 mcg with dialysis  Lab Results   Component Value Date    CALCIUM 8 6 07/26/2022    PHOS 5 1 (H) 07/26/2022     SUMMARY OF RECOMMENDATIONS:  - Plan for hemodialysis session tomorrow  - Evaluation and management of testicular pain and shoulder pain per primary team    HISTORY OF PRESENT ILLNESS:  Requesting Physician: Alberto Cristobal DO  Reason for Consult:  ESRD on HD    Lin Marquez is a 60 y o  male who was admitted to [facility] after presenting with left testicular pain and left shoulder pain  A renal consultation is requested today for assistance in the management of ESRD  He is on hemodialysis since February 2022 and is currently on a Tuesday/Thursday/Saturday schedule  His last hemodialysis session was last Saturday  He came in today for further evaluation of his left testicular pain and left shoulder pain  His testicular pain started 2 weeks ago  He denies any penile discharge  He denies dysuria  He was recently seen at an urgent care center where he was started on intravenous antibiotics  He was started on IV cefepime 1 g with each dialysis on 07/23/2022, and it is 08/06/2022 currently he denies fevers  He denies nausea or vomiting  He denies dyspnea  He denies abdominal pain  His outpatient hemodialysis prescription is as follows:  Estimated dry weight 104 kg, time 3 hour and 45 minutes, 2 K, 2 5 calcium, 35 bicarbonate, 138 sodium, blood flow rate 400, dialysate flow rate 500, dialyzer F-180  PAST MEDICAL HISTORY:  Past Medical History:   Diagnosis Date    CKD     [facility]     Hypertension     [facility] toe amputee ([facility] )     TIA (transient ischemic attack)        PAST SURGICAL HISTORY:  Past Surgical History:   Procedure Laterality Date    IR TUNNELED DIALYSIS CATHETER PLACEMENT  1/27/2022       ALLERGIES:  No Known Allergies    SOCIAL HISTORY:  Social History     Substance and Sexual Activity   Alcohol Use Not Currently    Alcohol/week: 0 0 standard drinks    Comment: 0     Social History     Substance and Sexual Activity   Drug Use Never     Social History     Tobacco Use   Smoking Status Never Smoker   Smokeless Tobacco Never Used       FAMILY HISTORY:  History reviewed   No pertinent family history  MEDICATIONS:    Current Facility-Administered Medications:     [START ON 7/27/2022] epoetin jessica (EPOGEN,PROCRIT) injection 800 Units, 800 Units, Intravenous, After Dialysis, Zaria Hoskins MD    vancomycin (VANCOCIN) 1500 mg in sodium chloride 0 9% 250 mL IVPB, 15 mg/kg, Intravenous, Once, [facility], 1,500 mg at 07/26/22 1057    Current Outpatient Medications:     levofloxacin (LEVAQUIN) 500 mg tablet, Take 1 tablet (500 mg total) by mouth every other day for 10 days Take every other day after dialysis for ten days, Disp: 5 tablet, Rfl: 0    acetaminophen (TYLENOL) 325 mg tablet, Take 2 tablets (650 mg total) by mouth every 6 (six) hours as needed for mild pain, headaches or fever, Disp: , Rfl: 0    allopurinol (ZYLOPRIM) 100 mg tablet, Take 100 mg by mouth daily, Disp: , Rfl:     atorvastatin (LIPITOR) 80 mg tablet, Take 80 mg by mouth daily, Disp: , Rfl:     carvedilol (COREG) 6 25 mg tablet, Take 12 5 mg by mouth 2 (two) times a day, Disp: , Rfl:     clopidogrel (PLAVIX) 75 mg tablet, Take 75 mg by mouth daily, Disp: , Rfl:     lidocaine (LIDODERM) 5 %, Apply 1 patch topically daily Lower back - Remove & Discard patch within 12 hours or as directed by MD, Disp: , Rfl: 0    linaGLIPtin 5 MG TABS, Take 5 mg by mouth in the morning, Disp: , Rfl: 0    naloxone (NARCAN) 4 mg/0 1 mL nasal spray, Administer 1 spray into a nostril   If no response after 2-3 minutes, give another dose in the other nostril using a new spray , Disp: 1 each, Rfl: 0    NIFEdipine ER (ADALAT CC) 30 MG 24 hr tablet, Take 30 mg by mouth 2 (two) times a day, Disp: , Rfl:     oxyCODONE (ROXICODONE) 5 immediate release tablet, Take 1 tablet (5 mg total) by mouth every 8 (eight) hours as needed for severe pain or moderate pain for up to 10 doses Max Daily Amount: 15 mg, Disp: 10 tablet, Rfl: 0    sevelamer (RENAGEL) 800 mg tablet, Take 1 tablet (800 mg total) by mouth 3 (three) times a day with meals, Disp: , Rfl: 0    REVIEW OF SYSTEMS:  Constitutional: Negative for fatigue, anorexia, fever, chills, diaphoresis  HENT: Negative for postnasal drip  Eyes: Negative for visual disturbance  Respiratory: Negative for cough, shortness of breath and wheezing  Cardiovascular: Negative for chest pain, palpitations and leg swelling  Gastrointestinal: Negative for abdominal pain, constipation, diarrhea, nausea and vomiting  Genitourinary: No dysuria, hematuria  Endocrine: Negative for polyuria  Musculoskeletal: Negative for arthralgias, back pain and joint swelling  Skin: Negative for rash  Neurological: Negative for focal weakness, headaches, dizziness  Hematological: Negative for easy bruising or bleeding  Psychiatric/Behavioral: Negative for confusion and sleep disturbance  All the systems were reviewed and were negative except as documented on the HPI  PHYSICAL EXAM:  Current Weight: Weight - Scale: 106 kg (233 lb)  First Weight: Weight - Scale: 106 kg (233 lb)  Vitals:    07/26/22 0815   BP: 139/65   BP Location: Right arm   Pulse: 75   Resp: 18   Temp: 100 °F (37 8 °C)   TempSrc: Oral   SpO2: 95%   Weight: 106 kg (233 lb)       Intake/Output Summary (Last 24 hours) at 7/26/2022 1109  Last data filed at 7/26/2022 1056  Gross per 24 hour   Intake 250 ml   Output --   Net 250 ml     Physical Exam  Constitutional:       Appearance: Normal appearance  HENT:      Head: Normocephalic and atraumatic  Mouth/Throat:      Mouth: Mucous membranes are moist       Pharynx: Oropharynx is clear  Cardiovascular:      Rate and Rhythm: Normal rate and regular rhythm  Heart sounds: Normal heart sounds  Comments: Right chest wall PermCath  Pulmonary:      Effort: Pulmonary effort is normal       Breath sounds: Normal breath sounds  Abdominal:      General: Bowel sounds are normal       Palpations: Abdomen is soft     Genitourinary:     Penis: Normal        Testes: Normal       Comments: No erythema on scrotum, no penile discharge  Musculoskeletal:         General: Normal range of motion  Skin:     General: Skin is warm and dry  Neurological:      General: No focal deficit present  Mental Status: He is alert and oriented to person, place, and time  Mental status is at baseline  Psychiatric:         Mood and Affect: Mood normal          Behavior: Behavior normal          Thought Content:  Thought content normal          Judgment: Judgment normal         Lab Results:   Results from last 7 days   Lab Units 07/26/22  0918   WBC Thousand/uL 14 89*   HEMOGLOBIN g/dL 9 8*   HEMATOCRIT % 30 4*   PLATELETS Thousands/uL 321   POTASSIUM mmol/L 4 4   CHLORIDE mmol/L 95*   CO2 mmol/L 26   BUN mg/dL 52*   CREATININE mg/dL 7 84*   CALCIUM mg/dL 8 6   MAGNESIUM mg/dL 2 0   PHOSPHORUS mg/dL 5 1*   ALK PHOS U/L 105   ALT U/L 19   AST U/L 16

## 2022-07-26 NOTE — ASSESSMENT & PLAN NOTE
Right shoulder pain present on admission, patient reports that it has been intermittent over the past week  When he woke up this morning the pain was very intense, indicated that it felt like a gout flare  He felt he was unable to drive his car, summoned EMS and presented to emergency department for further evaluation treatment  He was given IV Toradol x1 in the ED and reported significant improvement in pain  Patient was able to demonstrate full range of motion  Will obtain shoulder x-ray for completeness  Also will add on uric acid    If elevated may consider prednisone or colchicine, patient indicates that both of these have helped in the past

## 2022-07-26 NOTE — ASSESSMENT & PLAN NOTE
Leukocytosis present on admission as evidence by WBC 14 89  Patient does admit fever at home  Source of infection currently on clear  Procalcitonin mildly elevated at 0 44, will repeat in a m  Blood cultures x2 drawn  Patient did receive IV vancomycin and cefepime in the emergency department  Will continue with renally dosed vancomycin and pharmacy consult for management  Monitor fever curve and CBC

## 2022-07-26 NOTE — ED NOTES
Orders by Nephrologist for blood work is for tomorrow before dialysis       Avani Gavin, RN  07/26/22 1949

## 2022-07-26 NOTE — ASSESSMENT & PLAN NOTE
Lab Results   Component Value Date    HGBA1C 6 6 (H) 07/07/2022       No results for input(s): POCGLU in the last 72 hours  Blood Sugar Average: Last 72 hrs:     Patient normally takes linagliptin 5 mg p o  Daily, reported that he has not taken it recently as he did not have the medication  Will substitute with Accu-Cheks AC and HS with sliding scale    Adjust scale accordingly

## 2022-07-27 ENCOUNTER — APPOINTMENT (OUTPATIENT)
Dept: DIALYSIS | Facility: HOSPITAL | Age: 62
DRG: 553 | End: 2022-07-27
Payer: MEDICARE

## 2022-07-27 PROBLEM — M16.12 OSTEOARTHRITIS OF LEFT HIP: Status: ACTIVE | Noted: 2022-07-27

## 2022-07-27 PROBLEM — R65.10 SIRS (SYSTEMIC INFLAMMATORY RESPONSE SYNDROME) (HCC): Status: ACTIVE | Noted: 2022-07-26

## 2022-07-27 LAB
ANION GAP SERPL CALCULATED.3IONS-SCNC: 14 MMOL/L (ref 4–13)
BUN SERPL-MCNC: 52 MG/DL (ref 5–25)
CALCIUM SERPL-MCNC: 8.4 MG/DL (ref 8.3–10.1)
CHLORIDE SERPL-SCNC: 98 MMOL/L (ref 96–108)
CO2 SERPL-SCNC: 24 MMOL/L (ref 21–32)
CREAT SERPL-MCNC: 7.8 MG/DL (ref 0.6–1.3)
ERYTHROCYTE [DISTWIDTH] IN BLOOD BY AUTOMATED COUNT: 15 % (ref 11.6–15.1)
ERYTHROCYTE [SEDIMENTATION RATE] IN BLOOD: 49 MM/HOUR (ref 0–19)
GFR SERPL CREATININE-BSD FRML MDRD: 6 ML/MIN/1.73SQ M
GLUCOSE SERPL-MCNC: 106 MG/DL (ref 65–140)
GLUCOSE SERPL-MCNC: 118 MG/DL (ref 65–140)
GLUCOSE SERPL-MCNC: 119 MG/DL (ref 65–140)
GLUCOSE SERPL-MCNC: 122 MG/DL (ref 65–140)
GLUCOSE SERPL-MCNC: 169 MG/DL (ref 65–140)
HCT VFR BLD AUTO: 27.6 % (ref 36.5–49.3)
HGB BLD-MCNC: 9 G/DL (ref 12–17)
MCH RBC QN AUTO: 29.8 PG (ref 26.8–34.3)
MCHC RBC AUTO-ENTMCNC: 32.6 G/DL (ref 31.4–37.4)
MCV RBC AUTO: 91 FL (ref 82–98)
PHOSPHATE SERPL-MCNC: 5.1 MG/DL (ref 2.3–4.1)
PLATELET # BLD AUTO: 287 THOUSANDS/UL (ref 149–390)
PMV BLD AUTO: 9.8 FL (ref 8.9–12.7)
POTASSIUM SERPL-SCNC: 4.6 MMOL/L (ref 3.5–5.3)
PROCALCITONIN SERPL-MCNC: 0.71 NG/ML
RBC # BLD AUTO: 3.02 MILLION/UL (ref 3.88–5.62)
SODIUM SERPL-SCNC: 136 MMOL/L (ref 135–147)
VANCOMYCIN TROUGH SERPL-MCNC: 15.6 UG/ML (ref 10–20)
WBC # BLD AUTO: 14.92 THOUSAND/UL (ref 4.31–10.16)

## 2022-07-27 PROCEDURE — G0257 UNSCHED DIALYSIS ESRD PT HOS: HCPCS

## 2022-07-27 PROCEDURE — 5A1D70Z PERFORMANCE OF URINARY FILTRATION, INTERMITTENT, LESS THAN 6 HOURS PER DAY: ICD-10-PCS | Performed by: INTERNAL MEDICINE

## 2022-07-27 PROCEDURE — 85652 RBC SED RATE AUTOMATED: CPT | Performed by: PHYSICIAN ASSISTANT

## 2022-07-27 PROCEDURE — 82948 REAGENT STRIP/BLOOD GLUCOSE: CPT

## 2022-07-27 PROCEDURE — 80202 ASSAY OF VANCOMYCIN: CPT | Performed by: NURSE PRACTITIONER

## 2022-07-27 PROCEDURE — 80048 BASIC METABOLIC PNL TOTAL CA: CPT | Performed by: NURSE PRACTITIONER

## 2022-07-27 PROCEDURE — 99232 SBSQ HOSP IP/OBS MODERATE 35: CPT | Performed by: NURSE PRACTITIONER

## 2022-07-27 PROCEDURE — 87081 CULTURE SCREEN ONLY: CPT | Performed by: FAMILY MEDICINE

## 2022-07-27 PROCEDURE — 84100 ASSAY OF PHOSPHORUS: CPT | Performed by: NURSE PRACTITIONER

## 2022-07-27 PROCEDURE — 99222 1ST HOSP IP/OBS MODERATE 55: CPT | Performed by: ORTHOPAEDIC SURGERY

## 2022-07-27 PROCEDURE — 85027 COMPLETE CBC AUTOMATED: CPT | Performed by: NURSE PRACTITIONER

## 2022-07-27 PROCEDURE — 90935 HEMODIALYSIS ONE EVALUATION: CPT | Performed by: INTERNAL MEDICINE

## 2022-07-27 PROCEDURE — 84145 PROCALCITONIN (PCT): CPT | Performed by: NURSE PRACTITIONER

## 2022-07-27 RX ORDER — FAMOTIDINE 20 MG/1
10 TABLET, FILM COATED ORAL DAILY
Status: DISCONTINUED | OUTPATIENT
Start: 2022-07-27 | End: 2022-07-31 | Stop reason: HOSPADM

## 2022-07-27 RX ORDER — PREDNISONE 20 MG/1
40 TABLET ORAL DAILY
Status: DISCONTINUED | OUTPATIENT
Start: 2022-07-27 | End: 2022-07-28

## 2022-07-27 RX ORDER — OXYCODONE HYDROCHLORIDE AND ACETAMINOPHEN 5; 325 MG/1; MG/1
1 TABLET ORAL EVERY 6 HOURS PRN
Status: DISCONTINUED | OUTPATIENT
Start: 2022-07-27 | End: 2022-07-31 | Stop reason: HOSPADM

## 2022-07-27 RX ORDER — KETOROLAC TROMETHAMINE 30 MG/ML
15 INJECTION, SOLUTION INTRAMUSCULAR; INTRAVENOUS EVERY 12 HOURS
Status: DISCONTINUED | OUTPATIENT
Start: 2022-07-27 | End: 2022-07-27

## 2022-07-27 RX ORDER — CEFEPIME HYDROCHLORIDE 1 G/50ML
1000 INJECTION, SOLUTION INTRAVENOUS
Status: DISCONTINUED | OUTPATIENT
Start: 2022-07-27 | End: 2022-07-28

## 2022-07-27 RX ORDER — VANCOMYCIN HYDROCHLORIDE 1 G/200ML
10 INJECTION, SOLUTION INTRAVENOUS ONCE AS NEEDED
Status: DISCONTINUED | OUTPATIENT
Start: 2022-07-28 | End: 2022-07-28

## 2022-07-27 RX ORDER — NIFEDIPINE 30 MG/1
30 TABLET, EXTENDED RELEASE ORAL 2 TIMES DAILY
Status: DISCONTINUED | OUTPATIENT
Start: 2022-07-27 | End: 2022-07-28

## 2022-07-27 RX ORDER — OXYCODONE HYDROCHLORIDE 5 MG/1
5 TABLET ORAL EVERY 8 HOURS PRN
Status: DISCONTINUED | OUTPATIENT
Start: 2022-07-27 | End: 2022-07-28

## 2022-07-27 RX ORDER — DOXERCALCIFEROL 2 UG/ML
1.5 INJECTION, SOLUTION INTRAVENOUS
Status: DISCONTINUED | OUTPATIENT
Start: 2022-07-27 | End: 2022-07-31 | Stop reason: HOSPADM

## 2022-07-27 RX ADMIN — ATORVASTATIN CALCIUM 80 MG: 80 TABLET ORAL at 09:54

## 2022-07-27 RX ADMIN — HEPARIN SODIUM 5000 UNITS: 5000 INJECTION INTRAVENOUS; SUBCUTANEOUS at 09:53

## 2022-07-27 RX ADMIN — INSULIN LISPRO 1 UNITS: 100 INJECTION, SOLUTION INTRAVENOUS; SUBCUTANEOUS at 21:23

## 2022-07-27 RX ADMIN — HEPARIN SODIUM 5000 UNITS: 5000 INJECTION INTRAVENOUS; SUBCUTANEOUS at 17:55

## 2022-07-27 RX ADMIN — OXYCODONE HYDROCHLORIDE 5 MG: 5 TABLET ORAL at 07:29

## 2022-07-27 RX ADMIN — DOXERCALCIFEROL 1.5 MCG: 4 INJECTION, SOLUTION INTRAVENOUS at 11:03

## 2022-07-27 RX ADMIN — ALLOPURINOL 100 MG: 100 TABLET ORAL at 09:53

## 2022-07-27 RX ADMIN — VANCOMYCIN HYDROCHLORIDE 1000 MG: 1 INJECTION, SOLUTION INTRAVENOUS at 11:34

## 2022-07-27 RX ADMIN — ACETAMINOPHEN 650 MG: 325 TABLET, FILM COATED ORAL at 13:15

## 2022-07-27 RX ADMIN — PREDNISONE 40 MG: 20 TABLET ORAL at 17:54

## 2022-07-27 RX ADMIN — HEPARIN SODIUM 5000 UNITS: 5000 INJECTION INTRAVENOUS; SUBCUTANEOUS at 00:18

## 2022-07-27 RX ADMIN — HEPARIN SODIUM 5000 UNITS: 5000 INJECTION INTRAVENOUS; SUBCUTANEOUS at 23:56

## 2022-07-27 RX ADMIN — CARVEDILOL 12.5 MG: 12.5 TABLET, FILM COATED ORAL at 17:54

## 2022-07-27 RX ADMIN — CLOPIDOGREL BISULFATE 75 MG: 75 TABLET ORAL at 13:16

## 2022-07-27 RX ADMIN — OXYCODONE HYDROCHLORIDE 5 MG: 5 TABLET ORAL at 15:50

## 2022-07-27 RX ADMIN — OXYCODONE HYDROCHLORIDE 5 MG: 5 TABLET ORAL at 17:57

## 2022-07-27 RX ADMIN — FAMOTIDINE 10 MG: 20 TABLET ORAL at 09:53

## 2022-07-27 RX ADMIN — KETOROLAC TROMETHAMINE 15 MG: 30 INJECTION, SOLUTION INTRAMUSCULAR at 09:52

## 2022-07-27 RX ADMIN — CEFEPIME HYDROCHLORIDE 1000 MG: 1 INJECTION, SOLUTION INTRAVENOUS at 13:17

## 2022-07-27 RX ADMIN — NIFEDIPINE 30 MG: 30 TABLET, EXTENDED RELEASE ORAL at 13:15

## 2022-07-27 RX ADMIN — EPOETIN ALFA 800 UNITS: 2000 SOLUTION INTRAVENOUS; SUBCUTANEOUS at 11:02

## 2022-07-27 NOTE — PROGRESS NOTES
Vancomycin Assessment    Sarah Rodriguez is a 60 y o  male who is currently receiving vancomycin 1500mg IV once for other unclear source   Relevant clinical data and objective history reviewed:  Creatinine   Date Value Ref Range Status   07/26/2022 7 84 (H) 0 60 - 1 30 mg/dL Final     Comment:     Standardized to IDMS reference method   07/12/2022 6 82 (H) 0 60 - 1 30 mg/dL Final     Comment:     Standardized to IDMS reference method   07/11/2022 6 65 (H) 0 60 - 1 30 mg/dL Final     Comment:     Standardized to IDMS reference method     /57   Pulse 70   Temp 98 5 °F (36 9 °C) (Oral)   Resp 18   Wt 106 kg (233 lb)   SpO2 95%   BMI 30 74 kg/m²   I/O last 3 completed shifts: In: 250 [IV Piggyback:250]  Out: -   Lab Results   Component Value Date/Time    BUN 52 (H) 07/26/2022 09:18 AM    WBC 14 89 (H) 07/26/2022 09:18 AM    HGB 9 8 (L) 07/26/2022 09:18 AM    HCT 30 4 (L) 07/26/2022 09:18 AM    MCV 91 07/26/2022 09:18 AM     07/26/2022 09:18 AM     Temp Readings from Last 3 Encounters:   07/26/22 98 5 °F (36 9 °C) (Oral)   07/13/22 (!) 97 °F (36 1 °C) (Oral)   02/02/22 97 5 °F (36 4 °C) (Axillary)     Vancomycin Days of Therapy: 1    Assessment/Plan  The patient is currently on vancomycin utilizing pulse dosing based on adjusted body weight (due to obesity)  Baseline risks associated with therapy include: pre-existing renal impairment, concomitant nephrotoxic medications, and advanced age  The patient is currently receiving 1500mg IV once and after clinical evaluation will be changed to 1000mg IV post HD prn trough <20  Pharmacy will also follow closely for s/sx of nephrotoxicity, infusion reactions, and appropriateness of therapy  BMP and CBC will be ordered per protocol  Plan for trough as patient approaches steady state, prior to the 3rd  dose at approximately 0600 (pre HD) 7/09/22  Due to infection severity, will target a trough of 15-20 (appropriate for most indications)     Pharmacy will continue to follow the patients culture results and clinical progress daily      Irma Hamilton, Pharmacist

## 2022-07-27 NOTE — ASSESSMENT & PLAN NOTE
As evidenced by leukocytosis and fever, likely reactive to severe osteoarthritis  · No further fevers since starting prednisone  · No signs of infection thus far  · Would like to rule out infection of the left hip with a left hip joint aspiration as recommended by Orthopedic surgery  · Planning for left hip aspiration by the musculoskeletal radiologist on 07/29  · Continue prednisone  · Trend fever curve and CBC with diff

## 2022-07-27 NOTE — PROGRESS NOTES
Vancomycin Assessment    Hieu Sawyer is a 60 y o  male who is currently receiving vancomycin 1500mg IV once for other unclear source   Relevant clinical data and objective history reviewed:  Creatinine   Date Value Ref Range Status   07/26/2022 7 84 (H) 0 60 - 1 30 mg/dL Final     Comment:     Standardized to IDMS reference method   07/12/2022 6 82 (H) 0 60 - 1 30 mg/dL Final     Comment:     Standardized to IDMS reference method   07/11/2022 6 65 (H) 0 60 - 1 30 mg/dL Final     Comment:     Standardized to IDMS reference method     /57   Pulse 70   Temp 98 5 °F (36 9 °C) (Oral)   Resp 18   Wt 106 kg (233 lb)   SpO2 95%   BMI 30 74 kg/m²   I/O last 3 completed shifts: In: 250 [IV Piggyback:250]  Out: -   Lab Results   Component Value Date/Time    BUN 52 (H) 07/26/2022 09:18 AM    WBC 14 89 (H) 07/26/2022 09:18 AM    HGB 9 8 (L) 07/26/2022 09:18 AM    HCT 30 4 (L) 07/26/2022 09:18 AM    MCV 91 07/26/2022 09:18 AM     07/26/2022 09:18 AM     Temp Readings from Last 3 Encounters:   07/26/22 98 5 °F (36 9 °C) (Oral)   07/13/22 (!) 97 °F (36 1 °C) (Oral)   02/02/22 97 5 °F (36 4 °C) (Axillary)     Vancomycin Days of Therapy: 1    Assessment/Plan  The patient is currently on vancomycin utilizing pulse dosing based on adjusted body weight (due to obesity)  Baseline risks associated with therapy include: pre-existing renal impairment, concomitant nephrotoxic medications, and advanced age  The patient is currently receiving 1500mg IV once and after clinical evaluation will be changed to 1000mg IV post HD prn trough <20  Pharmacy will also follow closely for s/sx of nephrotoxicity, infusion reactions, and appropriateness of therapy  BMP and CBC will be ordered per protocol  Plan for trough as patient approaches steady state, prior to the 3rd  dose at approximately 0600 (pre HD) 7/09/22  Due to infection severity, will target a trough of 15-20 (appropriate for most indications)     Pharmacy will continue to follow the patients culture results and clinical progress daily      Franklin Post, Pharmacist

## 2022-07-27 NOTE — ASSESSMENT & PLAN NOTE
Left hip osteoarthritis as noted on imaging  Left hip pain could be the cause of his left testicular pain  · Appreciate orthopedic input  · CRP and ESR elevated  · Low suspicion for acute infection but will attempt left hip aspiration by IR if possible  · Will trial prednisone  · Trend inflammatory markers

## 2022-07-27 NOTE — PLAN OF CARE
Post-Dialysis RN Treatment Note    Blood Pressure:  Pre 123/103 mm/Hg  Post 149/74 mmHg   EDW  104 kg    Weight:  Pre 101 2 kg   Post 100 9 kg   Mode of weight measurement: Bed Scale   Volume Removed  1003 ml    Treatment duration 210 minutes    NS given  No    Treatment shortened? No   Medications given during Rx Epogen 800 units, Hectorol 1 5 mcg, Vancomycin 1000 mg   Estimated Kt/V  None Reported   Access type: Permacath/TDC   Access Issues: No    Report called to primary nurse   Yes Lisa Ruiz RN            Current hemodialysis plan of care is to remove a total of 1500 ml of fluid over a 3 1/2 hour treatment for a net of 1 liter as tolerated  Monitor vital signs every 15 minutes while on treatment for patient safety  Utilize a 2 K+ bath for serum potassium of 4 6 to maintain electrolyte balance  Report received from Lisa Ruiz RN  Plan reviewed with Dr Sammie Gilliland at bedside        Problem: METABOLIC, FLUID AND ELECTROLYTES - ADULT  Goal: Fluid balance maintained  Description: INTERVENTIONS:  - Monitor labs   - Monitor I/O and WT  - Instruct patient on fluid and nutrition as appropriate  - Assess for signs & symptoms of volume excess or deficit  7/27/2022 0931 by Parag Carrasco RN  Outcome: Progressing  7/27/2022 0931 by Parag Carrasco RN  Outcome: Progressing     Problem: METABOLIC, FLUID AND ELECTROLYTES - ADULT  Goal: Electrolytes maintained within normal limits  Description: INTERVENTIONS:  - Monitor labs and assess patient for signs and symptoms of electrolyte imbalances  - Administer electrolyte replacement as ordered  - Monitor response to electrolyte replacements, including repeat lab results as appropriate  - Instruct patient on fluid and nutrition as appropriate  Outcome: Progressing

## 2022-07-27 NOTE — ASSESSMENT & PLAN NOTE
Lab Results   Component Value Date    EGFR 6 07/27/2022    EGFR 6 07/26/2022    EGFR 7 07/12/2022    CREATININE 7 80 (H) 07/27/2022    CREATININE 7 84 (H) 07/26/2022    CREATININE 6 82 (H) 07/12/2022     Patient follows with [facility] outpatient;  Sivevkc-Rrxsqhiq-Dwoauigz via right anterior chest wall PermCath  · Nephrology following for dialysis management  · Underwent hemodialysis 7/27 and 7/28 with plan for next session on Saturday   · Continue Renagel  · Avoid nephrotoxic agents and relative hypotension

## 2022-07-27 NOTE — PROGRESS NOTES
Vancomycin Assessment    Freddy Laureano is a 60 y o  male who is currently receiving vancomycin 1500mg IV once for other unclear source   Relevant clinical data and objective history reviewed:  Creatinine   Date Value Ref Range Status   07/27/2022 7 80 (H) 0 60 - 1 30 mg/dL Final     Comment:     Standardized to IDMS reference method   07/26/2022 7 84 (H) 0 60 - 1 30 mg/dL Final     Comment:     Standardized to IDMS reference method   07/12/2022 6 82 (H) 0 60 - 1 30 mg/dL Final     Comment:     Standardized to IDMS reference method     /69   Pulse 81   Temp (!) 101 2 °F (38 4 °C) (Tympanic) Comment: Sofi Broderick RN notified  Resp 18   Wt 101 kg (221 lb 9 oz)   SpO2 97%   BMI 29 23 kg/m²   I/O last 3 completed shifts: In: 250 [IV Piggyback:250]  Out: 325 [Urine:325]  Lab Results   Component Value Date/Time    BUN 52 (H) 07/27/2022 04:40 AM    WBC 14 92 (H) 07/27/2022 04:40 AM    HGB 9 0 (L) 07/27/2022 04:40 AM    HCT 27 6 (L) 07/27/2022 04:40 AM    MCV 91 07/27/2022 04:40 AM     07/27/2022 04:40 AM     Temp Readings from Last 3 Encounters:   07/27/22 (!) 101 2 °F (38 4 °C) (Tympanic)   07/13/22 (!) 97 °F (36 1 °C) (Oral)   02/02/22 97 5 °F (36 4 °C) (Axillary)     Vancomycin Days of Therapy: 2    Assessment/Plan  The patient is currently on vancomycin utilizing pulse dosing  Baseline risks associated with therapy include: pre-existing renal impairment, concomitant nephrotoxic medications, and advanced age  The patient received 1500mg IV once with the most recent vancomycin level being not at steady-state and sub-therapeutic at 15 6 based on a goal of 15-20 (appropriate for most indications) ; therefore, dose will be 1000 mg (10 mg/kg based on adjusted body weight) post Hemodialysis for Trough < 20   Pharmacy will continue to follow closely for s/sx of nephrotoxicity, infusion reactions, and appropriateness of therapy  BMP and CBC will be ordered per protocol    Plan for trough as patient approaches steady state, prior to the next dose at approximately after dialysis on 7/28/2022  Pharmacy will continue to follow the patients culture results and clinical progress daily      Hugh Flanagan, Pharmacist

## 2022-07-27 NOTE — ASSESSMENT & PLAN NOTE
Lab Results   Component Value Date    EGFR 6 07/27/2022    EGFR 6 07/26/2022    EGFR 7 07/12/2022    CREATININE 7 80 (H) 07/27/2022    CREATININE 7 84 (H) 07/26/2022    CREATININE 6 82 (H) 07/12/2022     Patient follows with [facility];  Stxgdvn-Ysdksspt-Qjukopqq via right anterior chest wall PermCath  · Nephrology following for dialysis management  · Underwent hemodialysis today, 7/27, as he missed his session yesterday  · Continue Renagel  · Avoid nephrotoxic agents and relative hypotension

## 2022-07-27 NOTE — PLAN OF CARE
Problem: Potential for Falls  Goal: Patient will remain free of falls  Description: INTERVENTIONS:  - Educate patient/family on patient safety including physical limitations  - Instruct patient to call for assistance with activity   - Consult OT/PT to assist with strengthening/mobility   - Keep Call bell within reach  - Keep bed low and locked with side rails adjusted as appropriate  - Keep care items and personal belongings within reach  - Initiate and maintain comfort rounds  - Make Fall Risk Sign visible to staff  - Offer Toileting every 2 Hours, in advance of need  - Initiate/Maintain bed alarm  - Obtain necessary fall risk management equipment: yes  - Apply yellow socks and bracelet for high fall risk patients  - Consider moving patient to room near nurses station  7/27/2022 0221 by Deneen Goodman RN  Outcome: Progressing  7/27/2022 0221 by Deneen Goodman RN  Outcome: Progressing     Problem: PAIN - ADULT  Goal: Verbalizes/displays adequate comfort level or baseline comfort level  Description: Interventions:  - Encourage patient to monitor pain and request assistance  - Assess pain using appropriate pain scale  - Administer analgesics based on type and severity of pain and evaluate response  - Implement non-pharmacological measures as appropriate and evaluate response  - Consider cultural and social influences on pain and pain management  - Notify physician/advanced practitioner if interventions unsuccessful or patient reports new pain  7/27/2022 0221 by Deneen Goodman RN  Outcome: Progressing  7/27/2022 0221 by Deneen Goodman RN  Outcome: Progressing     Problem: INFECTION - ADULT  Goal: Absence or prevention of progression during hospitalization  Description: INTERVENTIONS:  - Assess and monitor for signs and symptoms of infection  - Monitor lab/diagnostic results  - Monitor all insertion sites, i e  indwelling lines, tubes, and drains  - Monitor endotracheal if appropriate and nasal secretions for changes in amount and color  - [place] appropriate cooling/warming therapies per order  - Administer medications as ordered  - Instruct and encourage patient and family to use good hand hygiene technique  - Identify and instruct in appropriate isolation precautions for identified infection/condition  7/27/2022 0221 by Yenni Jeffery RN  Outcome: Progressing  7/27/2022 0221 by Yenni Jeffery RN  Outcome: Progressing  Goal: Absence of fever/infection during neutropenic period  Description: INTERVENTIONS:  - Monitor WBC    7/27/2022 0221 by Yenni Jeffery RN  Outcome: Progressing  7/27/2022 0221 by Yenni Jeffery RN  Outcome: Progressing     Problem: SAFETY ADULT  Goal: Patient will remain free of falls  Description: INTERVENTIONS:  - Educate patient/family on patient safety including physical limitations  - Instruct patient to call for assistance with activity   - Consult OT/PT to assist with strengthening/mobility   - Keep Call bell within reach  - Keep bed low and locked with side rails adjusted as appropriate  - Keep care items and personal belongings within reach  - Initiate and maintain comfort rounds  - Make Fall Risk Sign visible to staff  - Offer Toileting every 2 Hours, in advance of need  - Initiate/Maintain bed alarm  - Obtain necessary fall risk management equipment: yes  - Apply yellow socks and bracelet for high fall risk patients  - Consider moving patient to room near nurses station  7/27/2022 0221 by Yenni Jeffery RN  Outcome: Progressing  7/27/2022 0221 by Yenni Jeffery RN  Outcome: Progressing  Goal: Maintain or return to baseline ADL function  Description: INTERVENTIONS:  -  Assess patient's ability to carry out ADLs; assess patient's baseline for ADL function and identify physical deficits which impact ability to perform ADLs (bathing, care of mouth/teeth, toileting, grooming, dressing, etc )  - Assess/evaluate cause of self-care deficits   - Assess range of motion  - Assess patient's mobility; develop plan if impaired  - Assess patient's need for assistive devices and provide as appropriate  - Encourage maximum independence but intervene and supervise when necessary  - Involve family in performance of ADLs  - Assess for home care needs following discharge   - Consider OT consult to assist with ADL evaluation and planning for discharge  - Provide patient education as appropriate  7/27/2022 0221 by Kay Maldonado RN  Outcome: Progressing  7/27/2022 0221 by Kay Maldonado RN  Outcome: Progressing  Goal: Maintains/Returns to pre admission functional level  Description: INTERVENTIONS:  - Perform BMAT or MOVE assessment daily    - Set and communicate daily mobility goal to care team and patient/family/caregiver  - Collaborate with rehabilitation services on mobility goals if consulted  - Perform Range of Motion 3 times a day  - Reposition patient every 2 hours    - Dangle patient 3 times a day  - Stand patient 3 times a day  - Ambulate patient 3 times a day  - Out of bed to chair 3 times a day   - Out of bed for meals 3 times a day  - Out of bed for toileting  - Record patient progress and toleration of activity level   7/27/2022 0221 by Kay Maldonado RN  Outcome: Progressing  7/27/2022 0221 by Kay Maldonado RN  Outcome: Progressing     Problem: DISCHARGE PLANNING  Goal: Discharge to home or other facility with appropriate resources  Description: INTERVENTIONS:  - Identify barriers to discharge w/patient and caregiver  - Arrange for needed discharge resources and transportation as appropriate  - Identify discharge learning needs (meds, wound care, etc )  - Arrange for interpretive services to assist at discharge as needed  - Refer to Case Management Department for coordinating discharge planning if the patient needs post-hospital services based on physician/advanced practitioner order or complex needs related to functional status, cognitive ability, or social support system  7/27/2022 0221 by Bouchra Ho RN  Outcome: Progressing  7/27/2022 0221 by Bouchra Ho RN  Outcome: Progressing     Problem: Knowledge Deficit  Goal: Patient/family/caregiver demonstrates understanding of disease process, treatment plan, medications, and discharge instructions  Description: Complete learning assessment and assess knowledge base    Interventions:  - Provide teaching at level of understanding  - Provide teaching via preferred learning methods  7/27/2022 0221 by Bouchra Ho RN  Outcome: Progressing  7/27/2022 0221 by Bouchra Ho RN  Outcome: Progressing     Problem: METABOLIC, FLUID AND ELECTROLYTES - ADULT  Goal: Fluid balance maintained  Description: INTERVENTIONS:  - Monitor labs   - Monitor I/O and WT  - Instruct patient on fluid and nutrition as appropriate  - Assess for signs & symptoms of volume excess or deficit  7/27/2022 0221 by Bouchra Ho RN  Outcome: Progressing  7/27/2022 0221 by Bouchra Ho RN  Outcome: Progressing  Goal: Glucose maintained within target range  Description: INTERVENTIONS:  - Monitor Blood Glucose as ordered  - Assess for signs and symptoms of hyperglycemia and hypoglycemia  - Administer ordered medications to maintain glucose within target range  - Assess nutritional intake and initiate nutrition service referral as needed  7/27/2022 0221 by Bouchra Ho RN  Outcome: Progressing  7/27/2022 0221 by Bouchra Ho RN  Outcome: Progressing

## 2022-07-27 NOTE — ASSESSMENT & PLAN NOTE
Presenting with right shoulder pain which has been present for the past week  Unable to drive his car  Missed dialysis session due to severe right shoulder pain and testicular pain    · No osseous abnormality noted on right shoulder x-ray  · Continue PT/OT  · Pain control  · Orthopedics consult

## 2022-07-27 NOTE — ASSESSMENT & PLAN NOTE
Lab Results   Component Value Date    HGBA1C 6 6 (H) 07/07/2022       Recent Labs     07/26/22  2049 07/27/22  0707 07/27/22  1142 07/27/22  1556   POCGLU 139 118 122 119       Blood Sugar Average: Last 72 hrs:  (P) 126 8   · Not taking his Linagliptin as he does not have any further tablets  · Monitor Accu-Cheks AC + HS with lispro insulin sliding scale coverage

## 2022-07-27 NOTE — ASSESSMENT & PLAN NOTE
Presents with left testicular pain which has been present for several weeks  Saw an outpatient provider who prescribes cefepime after dialysis for presumed epididymitis  Patient states this pain feels similar to his prior history of epididymitis    · No signs of epididymitis on imaging  · Spoke with Infectious Disease, possible referred pain from severe osteoarthritis of the left hip  · Will trial prednisone  · Continue cefepime for possible epididymitis  · Follow-up urology consultation for further recommendations

## 2022-07-27 NOTE — CONSULTS
H&P Exam - Urology       Patient: Guzman Egan   : 1960 Sex: male   MRN: [id number]     CSN: [id number]      History of Present Illness   HPI:  Guzman Egan is a 60 y o  male who was admitted for severe left orchalgia started on antibiotics as an outpatient for possible epididymitis with no relief urologic consult called for  Patient seen at the bedside on dialysis 3 times a week for chronic renal insufficiency secondary to diabetes with known left hip osteoarthritis with significant back pain patient states that he had epididymitis 10 years ago and was treated with appropriate antibiotics with pain and swelling resolved stating that he has now had significant left orchalgia for over 1 week which worsens when he ambulates or moves with no pain noted when lying in bed  Voiding 3 times a day no more than 100 cc per void denying gross hematuria dysuria fevers or chills        Review of Systems:   Constitutional:  Negative for activity change, fever, chills and diaphoresis  HENT: Negative for hearing loss and trouble swallowing  Eyes: Negative for itching and visual disturbance  Respiratory: Negative for chest tightness and shortness of breath  Cardiovascular: Negative for chest pain, edema  Gastrointestinal: Negative for abdominal distention, na abdominal pain, constipation, diarrhea, Nausea and vomiting  Genitourinary: Negative for decreased urine volume, difficulty urinating, dysuria, enuresis, frequency, hematuria and urgency  Musculoskeletal: Negative for gait problem and myalgias  Neurological: Negative for dizziness and headaches  Hematological: Does not bruise/bleed easily         Historical Information   Past Medical History:   Diagnosis Date    CKD     [facility]     Hypertension     Left toe amputee [facility]     TIA (transient ischemic attack)      Past Surgical History:   Procedure Laterality Date    IR TUNNELED DIALYSIS CATHETER PLACEMENT  2022     Social History   Social History     Substance and Sexual Activity   Alcohol Use Not Currently    Alcohol/week: 0 0 standard drinks    Comment: 0     Social History     Substance and Sexual Activity   Drug Use Never     Social History     Tobacco Use   Smoking Status Never Smoker   Smokeless Tobacco Never Used     Family History: History reviewed  No pertinent family history  Meds/Allergies   Medications Prior to Admission   Medication    acetaminophen (TYLENOL) 325 mg tablet    aspirin 81 mg chewable tablet    carvedilol (COREG) 6 25 mg tablet    lidocaine (LIDODERM) 5 %    NIFEdipine ER (ADALAT CC) 30 MG 24 hr tablet    oxyCODONE (ROXICODONE) 5 immediate release tablet    sevelamer (RENAGEL) 800 mg tablet    allopurinol (ZYLOPRIM) 100 mg tablet    atorvastatin (LIPITOR) 80 mg tablet    clopidogrel (PLAVIX) 75 mg tablet    linaGLIPtin 5 MG TABS    naloxone (NARCAN) 4 mg/0 1 mL nasal spray     No Known Allergies    Objective   Vitals: /58 (BP Location: Left arm)   Pulse 75   Temp 98 2 °F (36 8 °C) (Oral)   Resp 20   Wt 101 kg (221 lb 9 oz)   SpO2 91%   BMI 29 23 kg/m²     Physical Exam:  General Alert awake   Normocephalic atraumatic PERRLA  Lungs clear bilaterally  Cardiac normal S1 normal S2  Abdomen soft, flank pain none  Normal right testicle  Tiny right epididymal head cyst nontender   no obvious right inguinal hernia  Normal left testicle epididymis slightly tender left epididymal head cyst  No left inguinal hernia  Rectal exam refused  Normal left testicle  Extremities no edema    I/O last 24 hours: In: 750 [I V :500; IV Piggyback:250]  Out: 3175 [Urine:775;  Other:1503]    Invasive Devices  Report    Peripheral Intravenous Line  Duration           Peripheral IV 07/26/22 Left Antecubital 1 day          Hemodialysis Catheter  Duration           HD Permanent Double Catheter 181 days                    Lab Results: CBC:   Lab Results   Component Value Date    WBC 14 92 (H) 07/27/2022    HGB 9 0 (L) 07/27/2022    HCT 27 6 (L) 07/27/2022    MCV 91 07/27/2022     07/27/2022    MCH 29 8 07/27/2022    MCHC 32 6 07/27/2022    RDW 15 0 07/27/2022    MPV 9 8 07/27/2022    NRBC 0 07/26/2022     CMP:   Lab Results   Component Value Date    CL 98 07/27/2022    CO2 24 07/27/2022    BUN 52 (H) 07/27/2022    CREATININE 7 80 (H) 07/27/2022    CALCIUM 8 4 07/27/2022    AST 16 07/26/2022    ALT 19 07/26/2022    ALKPHOS 105 07/26/2022    EGFR 6 07/27/2022     Urinalysis:   Lab Results   Component Value Date    COLORU Yellow 07/26/2022    CLARITYU Clear 07/26/2022    SPECGRAV 1 015 07/26/2022    PHUR 6 5 07/26/2022    LEUKOCYTESUR Negative 07/26/2022    NITRITE Negative 07/26/2022    GLUCOSEU 100 (1/10%) (A) 07/26/2022    KETONESU Negative 07/26/2022    BILIRUBINUR Negative 07/26/2022    BLOODU Trace-Intact (A) 07/26/2022     Urine Culture: No results found for: URINECX  PSA: No results found for: PSA        Assessment/ Plan:  Left severe orchalgia  Could possibly be related to both osteo arthritis causing neuropathy with referred pain to the left testicle  Or inflamed left epididymal cyst due to trauma  Patient should see spine specialist for  Possible rehab  As far as inflamed left epididymal cyst  Could try nonsteroidals as per Nephrology  Tight fitting underwear for a few days  Should clear with time  No obvious epididymitis or intervention      Rangel Linton MD

## 2022-07-27 NOTE — PROGRESS NOTES
22 1100   Clinical Encounter Type   Visited With Patient   Routine Visit Introduction   Continue Visiting Yes   Referral From Nurse    Pastoral Care Progress Note    2022  Patient: Milagros Stanley : 1960  Admission Date & Time: 2022 7798  MRN: [id number] CSN: [id number]        Patient expressed frustration ("disgusted") with own health progress during  introductory visit  No spiritual needs at this time  Please contact spiritual care if any needs arise                Chaplaincy Interventions Utilized:    Exploration: Explored emotional needs & resources, Facilitated story telling, and Identified, evaluated & reinforced appropriate coping strategies    Relationship Building: Cultivated a relationship of care and support and Listened empathically    Chaplaincy Outcomes Achieved:  Expressed gratitude and Expressed humor      Spiritual Coping Strategies Utilized:   Connectedness

## 2022-07-27 NOTE — ASSESSMENT & PLAN NOTE
Presenting with right shoulder pain which has been present for the past week  Unable to drive his car  Missed dialysis session due to severe right shoulder pain and testicular pain    · No osseous abnormality noted on right shoulder x-ray  · Pain improving with prednisone  · Continue PT/OT  · Appreciate orthopedic input

## 2022-07-27 NOTE — ASSESSMENT & PLAN NOTE
Left hip osteoarthritis as noted on imaging  Left hip pain could be the cause of his left testicular pain  · Appreciate orthopedic input  · CRP and ESR elevated  · Initially patient refused left hip aspiration secondary to severe pain, now amenable to aspiration  · Hip aspiration done today yielded 0 mL fluid    No culture was collected  · Continue prednisone  · Trend inflammatory markers  · Patient to follow-up with outpatient pain management

## 2022-07-27 NOTE — PROGRESS NOTES
[facility]  Progress Note - Dirk Stewardher 1960, 60 y o  male MRN: [id number]  Unit/Bed#: [address] [place] [address] Encounter: [id number]  Primary Care Provider: Darek Pichardo MD   Date and time admitted to hospital: 7/26/2022  8:11 AM    * Pain in left testicle  Assessment & Plan  Presents with left testicular pain which has been present for several weeks  Saw an outpatient provider who prescribes cefepime after dialysis for presumed epididymitis  Patient states this pain feels similar to his prior history of epididymitis  · No signs of epididymitis on imaging  · Spoke with Infectious Disease, possible referred pain from severe osteoarthritis of the left hip  · Will trial prednisone  · Continue cefepime for possible epididymitis  · Follow-up urology consultation for further recommendations    Osteoarthritis of left hip  Assessment & Plan  Left hip osteoarthritis as noted on imaging  Left hip pain could be the cause of his left testicular pain  · Appreciate orthopedic input  · CRP and ESR elevated  · Low suspicion for acute infection but will attempt left hip aspiration by IR if possible  · Will trial prednisone  · Trend inflammatory markers    Right shoulder pain  Assessment & Plan  Presenting with right shoulder pain which has been present for the past week  Unable to drive his car  Missed dialysis session due to severe right shoulder pain and testicular pain    · No osseous abnormality noted on right shoulder x-ray  · Continue PT/OT  · Pain control  · Orthopedics consult    SIRS (systemic inflammatory response syndrome) (HCC)  Assessment & Plan  As evidenced by leukocytosis and fever, likely reactive to severe osteoarthritis  · Will need to rule out infection  · Blood culture show no growth at 24 hours  · IR consulted for possible left hip aspiration  · Continue vancomycin and cefepime and follow up cultures  · Trial prednisone  · Trend fever curve and CBC with diff    Hypertension  Assessment & Plan  BP stable  Continue Coreg and nifedipine    Type 2 diabetes mellitus, without long-term current use of insulin [facility])  Assessment & Plan  Lab Results   Component Value Date    HGBA1C 6 6 (H) 07/07/2022       Recent Labs     07/26/22  2049 07/27/22  0707 07/27/22  1142 07/27/22  1556   POCGLU 139 118 122 119       Blood Sugar Average: Last 72 hrs:  (P) 126 8   · Not taking his Linagliptin as he does not have any further tablets  · Monitor Accu-Cheks AC + HS with lispro insulin sliding scale coverage    ESRD (end stage renal disease) [facility])  Assessment & Plan  Lab Results   Component Value Date    EGFR 6 07/27/2022    EGFR 6 07/26/2022    EGFR 7 07/12/2022    CREATININE 7 80 (H) 07/27/2022    CREATININE 7 84 (H) 07/26/2022    CREATININE 6 82 (H) 07/12/2022     Patient follows with [facility] outpatient; [id number] via right anterior chest wall PermCath  · Nephrology following for dialysis management  · Underwent hemodialysis today, 7/27, as he missed his session yesterday  · Continue Renagel  · Avoid nephrotoxic agents and relative hypotension        VTE Pharmacologic Prophylaxis:   High Risk (Score >/= 5) - Pharmacological DVT Prophylaxis Ordered: heparin  Sequential Compression Devices Ordered  Patient Centered Rounds: I performed bedside rounds with nursing staff today  Discussions with Specialists or Other Care Team Provider: nursing, CM, nephrology, ID AP, Ortho AP    Education and Discussions with Family / Patient: Patient declined call to   Time Spent for Care: 30 minutes  More than 50% of total time spent on counseling and coordination of care as described above  Current Length of Stay: 0 day(s)  Current Patient Status: Inpatient   Certification Statement: The patient will continue to require additional inpatient hospital stay due to SIRS, r/o infection   Discharge Plan: Anticipate discharge in 48 hrs to home      Code Status: Level 1 - Full Code    Subjective:   Patient seen and examined at bedside  Reports left testicular pain  Denies any left hip or groin pain  Reports improvement in right shoulder pain  Has good range of motion in the right shoulder  Denies chest pain or shortness of breath  Tolerated hemodialysis  Appetite is good  Objective:     Vitals:   Temp (24hrs), Av 3 °F (37 4 °C), Min:98 °F (36 7 °C), Max:101 8 °F (38 8 °C)    Temp:  [98 °F (36 7 °C)-101 8 °F (38 8 °C)] 98 2 °F (36 8 °C)  HR:  [] 75  Resp:  [18-20] 20  BP: (117-159)/() 122/59  SpO2:  [91 %-97 %] 91 %  Body mass index is 29 23 kg/m²  Input and Output Summary (last 24 hours): Intake/Output Summary (Last 24 hours) at 2022 1643  Last data filed at 2022 1236  Gross per 24 hour   Intake 500 ml   Output 2278 ml   Net -1778 ml       Physical Exam:   Physical Exam  Vitals and nursing note reviewed  Constitutional:       General: He is not in acute distress  Appearance: He is well-developed  He is ill-appearing  He is not toxic-appearing or diaphoretic  HENT:      Head: Normocephalic and atraumatic  Eyes:      Conjunctiva/sclera: Conjunctivae normal    Cardiovascular:      Rate and Rhythm: Normal rate and regular rhythm  Pulmonary:      Effort: Pulmonary effort is normal  No respiratory distress  Breath sounds: Normal breath sounds  No wheezing or rales  Abdominal:      General: Bowel sounds are normal  There is no distension  Palpations: Abdomen is soft  Tenderness: There is no abdominal tenderness  Genitourinary:     Testes: Normal    Musculoskeletal:         General: Tenderness (mild right shoulder ) present  No swelling or deformity  Normal range of motion  Cervical back: Normal range of motion  Right lower leg: No edema  Left lower leg: No edema  Skin:     General: Skin is warm and dry  Neurological:      Mental Status: He is alert and oriented to person, place, and time  Mental status is at baseline  Psychiatric:         Mood and Affect: Mood normal          Behavior: Behavior normal          Thought Content: Thought content normal           Additional Data:     Labs:  Results from last 7 days   Lab Units 07/27/22  0440 07/26/22  0918   WBC Thousand/uL 14 92* 14 89*   HEMOGLOBIN g/dL 9 0* 9 8*   HEMATOCRIT % 27 6* 30 4*   PLATELETS Thousands/uL 287 321   NEUTROS PCT %  --  83*   LYMPHS PCT %  --  7*   MONOS PCT %  --  8   EOS PCT %  --  1     Results from last 7 days   Lab Units 07/27/22  0440 07/26/22  0918   SODIUM mmol/L 136 134*   POTASSIUM mmol/L 4 6 4 4   CHLORIDE mmol/L 98 95*   CO2 mmol/L 24 26   BUN mg/dL 52* 52*   CREATININE mg/dL 7 80* 7 84*   ANION GAP mmol/L 14* 13   CALCIUM mg/dL 8 4 8 6   ALBUMIN g/dL  --  3 0*   TOTAL BILIRUBIN mg/dL  --  0 22   ALK PHOS U/L  --  105   ALT U/L  --  19   AST U/L  --  16   GLUCOSE RANDOM mg/dL 106 182*         Results from last 7 days   Lab Units 07/27/22  1556 07/27/22  1142 07/27/22  0707 07/26/22  2049 07/26/22  1551   POC GLUCOSE mg/dl 119 122 118 139 136         Results from last 7 days   Lab Units 07/27/22  0440 07/26/22  0918   LACTIC ACID mmol/L  --  0 5   PROCALCITONIN ng/ml 0 71* 0 44*       Lines/Drains:  Invasive Devices  Report    Peripheral Intravenous Line  Duration           Peripheral IV 07/26/22 Left Antecubital 1 day          Hemodialysis Catheter  Duration           HD Permanent Double Catheter 181 days                      Imaging: Reviewed radiology reports from this admission including: chest xray, abdominal/pelvic CT and scrotal US    Recent Cultures (last 7 days):   Results from last 7 days   Lab Units 07/26/22  0948   BLOOD CULTURE  No Growth at 24 hrs  No Growth at 24 hrs         Last 24 Hours Medication List:   Current Facility-Administered Medications   Medication Dose Route Frequency Provider Last Rate    acetaminophen  650 mg Oral Q6H PRN GRANT Wynne      allopurinol  100 mg Oral Daily GRANT Nobles      atorvastatin  80 mg Oral Daily [address], [address]      carvedilol  12 5 mg Oral BID GRANT Nobles      cefepime  1,000 mg Intravenous After Dialysis Haroon Madrigal MD 1,000 mg (07/27/22 1317)    clopidogrel  75 mg Oral Daily Sudhir Joel, [address]      doxercalciferol  1 5 mcg Intravenous After Dialysis Haroon Madrigal MD      epoetin jessica  800 Units Intravenous After Dialysis GRANT Nobles      famotidine  10 mg Oral Daily GRANT Barbosa      heparin (porcine)  5,000 Units Subcutaneous [facility] [address] [place], [address]      insulin lispro  1-5 Units Subcutaneous HS GRANT Nobles      insulin lispro  1-6 Units Subcutaneous TID [facility] GRANT Nobles      lidocaine  1 patch Topical Daily Sudhir Joel, [address]      NIFEdipine  30 mg Oral BID GRANT Nobles      ondansetron  4 mg Intravenous Q6H PRN GRANT Nobles      oxyCODONE  5 mg Oral Q6H PRN GRANT Nobles      predniSONE  40 mg Oral Daily Phyllis Cuevas, [address]      sevelamer  800 mg Oral TID With Meals GRANT Nobles      [START ON 7/28/2022] vancomycin  10 mg/kg (Adjusted) Intravenous Once PRN GRANT Nobles          Today, Patient Was Seen By: GRANT Barbosa    **Please Note: This note may have been constructed using a voice recognition system  **

## 2022-07-27 NOTE — PLAN OF CARE
Problem: Potential for Falls  Goal: Patient will remain free of falls  Description: INTERVENTIONS:  - Educate patient/family on patient safety including physical limitations  - Instruct patient to call for assistance with activity   - Consult OT/PT to assist with strengthening/mobility   - Keep Call bell within reach  - Keep bed low and locked with side rails adjusted as appropriate  - Keep care items and personal belongings within reach  - Initiate and maintain comfort rounds  - Make Fall Risk Sign visible to staff  - Apply yellow socks and bracelet for high fall risk patients  - Consider moving patient to room near nurses station  Outcome: Progressing     Problem: PAIN - ADULT  Goal: Verbalizes/displays adequate comfort level or baseline comfort level  Description: Interventions:  - Encourage patient to monitor pain and request assistance  - Assess pain using appropriate pain scale  - Administer analgesics based on type and severity of pain and evaluate response  - Implement non-pharmacological measures as appropriate and evaluate response  - Consider cultural and social influences on pain and pain management  - Notify physician/advanced practitioner if interventions unsuccessful or patient reports new pain  Outcome: Progressing     Problem: INFECTION - ADULT  Goal: Absence or prevention of progression during hospitalization  Description: INTERVENTIONS:  - Assess and monitor for signs and symptoms of infection  - Monitor lab/diagnostic results  - Monitor all insertion sites, i e  indwelling lines, tubes, and drains  - Monitor endotracheal if appropriate and nasal secretions for changes in amount and color  - [place] appropriate cooling/warming therapies per order  - Administer medications as ordered  - Instruct and encourage patient and family to use good hand hygiene technique  - Identify and instruct in appropriate isolation precautions for identified infection/condition  Outcome: Progressing  Goal: Absence of fever/infection during neutropenic period  Description: INTERVENTIONS:  - Monitor WBC    Outcome: Progressing     Problem: SAFETY ADULT  Goal: Patient will remain free of falls  Description: INTERVENTIONS:  - Educate patient/family on patient safety including physical limitations  - Instruct patient to call for assistance with activity   - Consult OT/PT to assist with strengthening/mobility   - Keep Call bell within reach  - Keep bed low and locked with side rails adjusted as appropriate  - Keep care items and personal belongings within reach  - Initiate and maintain comfort rounds  - Make Fall Risk Sign visible to staff  - Apply yellow socks and bracelet for high fall risk patients  - Consider moving patient to room near nurses station  Outcome: Progressing  Goal: Maintain or return to baseline ADL function  Description: INTERVENTIONS:  -  Assess patient's ability to carry out ADLs; assess patient's baseline for ADL function and identify physical deficits which impact ability to perform ADLs (bathing, care of mouth/teeth, toileting, grooming, dressing, etc )  - Assess/evaluate cause of self-care deficits   - Assess range of motion  - Assess patient's mobility; develop plan if impaired  - Assess patient's need for assistive devices and provide as appropriate  - Encourage maximum independence but intervene and supervise when necessary  - Involve family in performance of ADLs  - Assess for home care needs following discharge   - Consider OT consult to assist with ADL evaluation and planning for discharge  - Provide patient education as appropriate  Outcome: Progressing  Goal: Maintains/Returns to pre admission functional level  Description: INTERVENTIONS:  - Perform BMAT or MOVE assessment daily    - Set and communicate daily mobility goal to care team and patient/family/caregiver     - Collaborate with rehabilitation services on mobility goals if consulted  - Out of bed for toileting  - Record patient progress and toleration of activity level   Outcome: Progressing     Problem: DISCHARGE PLANNING  Goal: Discharge to home or other facility with appropriate resources  Description: INTERVENTIONS:  - Identify barriers to discharge w/patient and caregiver  - Arrange for needed discharge resources and transportation as appropriate  - Identify discharge learning needs (meds, wound care, etc )  - Arrange for interpretive services to assist at discharge as needed  - Refer to Case Management Department for coordinating discharge planning if the patient needs post-hospital services based on physician/advanced practitioner order or complex needs related to functional status, cognitive ability, or social support system  Outcome: Progressing     Problem: Knowledge Deficit  Goal: Patient/family/caregiver demonstrates understanding of disease process, treatment plan, medications, and discharge instructions  Description: Complete learning assessment and assess knowledge base    Interventions:  - Provide teaching at level of understanding  - Provide teaching via preferred learning methods  Outcome: Progressing     Problem: METABOLIC, FLUID AND ELECTROLYTES - ADULT  Goal: Fluid balance maintained  Description: INTERVENTIONS:  - Monitor labs   - Monitor I/O and WT  - Instruct patient on fluid and nutrition as appropriate  - Assess for signs & symptoms of volume excess or deficit  Outcome: Progressing  Goal: Glucose maintained within target range  Description: INTERVENTIONS:  - Monitor Blood Glucose as ordered  - Assess for signs and symptoms of hyperglycemia and hypoglycemia  - Administer ordered medications to maintain glucose within target range  - Assess nutritional intake and initiate nutrition service referral as needed  Outcome: Progressing

## 2022-07-27 NOTE — PROGRESS NOTES
[facility]  PROGRESS NOTE   Joni Hankins 60 y o  male MRN: [id number]  Unit/Bed#: [address] [place] [address] Encounter: [id number]  Reason for Consult: ESRD on HD     ASSESSMENT and PLAN:  Joni Hankins is a 60 y o  male who was admitted to [place] after presenting with left testicular pain and left shoulder pain  A renal consultation is requested today for assistance in the management of ESRD      End-stage renal disease on hemodialysis  - Schedule Tuesday/Thursday/Saturday  - Missed HD yesterday   - Seen and examined on HD today   - Access: Right chest wall PermCath    HEMODIALYSIS PROCEDURE NOTE  The patient was seen and examined on hemodialysis    Time: 3 5 hours  Sodium: 138 Blood flow: 400   Dialyzer: F160 Potassium: 2 Dialysate flow: 1 5 x BFR   Access: RTDC Bicarbonate: 35 Ultrafiltration goal: 1 L   Medications on HD: Epogen 800 units, Hectorol 1 5 mcg, Cefepime 1 g      Left testicular pain  - Unclear etiology, questionable epididymitis  - Ultrasound of scrotum with no acute abnormality  - CT abdomen with no acute inflammatory infectious processes, prominent left inguinal lymph nodes as identified before  - Recently started on IV cefepime 1 g 3 times a week with dialysis  - Start date 07/23/2022, end date 08/06/2022 for outpatient dialysis unit     Leukocytosis  - Need to rule out bacteremia in setting of presence of dialysis catheter   - Agree with orthopedic evaluation of right shoulder   - Agree with broad-spectrum antibiotics at this time  - Follow blood cultures      Hypertension/volume  - Continue home antihypertensive regimen of Coreg and nifedipine  - We will aim for dry weight of 104 kg on dialysis     Anemia  - Goal hemoglobin 10-11  - Continue Epogen 800 units with each session of dialysis  Lab Results   Component Value Date    HGB 9 0 (L) 07/27/2022     Electrolytes/acid base  - 2 K bath on HD today   Lab Results   Component Value Date    K 4 6 07/27/2022    CL 98 07/27/2022    CO2 24 07/27/2022 Bone mineral disease  - Goal serum phosphate less than 5 5  - Continue Renvela 3 tablets t i d  With meals  - We will give IV Hectorol 1 5 mcg with dialysis  Lab Results   Component Value Date    CALCIUM 8 4 07/27/2022    PHOS 5 1 (H) 07/27/2022     DISPOSITION:  - HD today     SUBJECTIVE / 24H INTERVAL HISTORY:  Patient seen and examined on dialysis  Continues to pain in his left testicle  Also complains of mild pain in his right shoulder  Denies dyspnea or chest pain      OBJECTIVE:  Current Weight: Weight - Scale: 101 kg (221 lb 9 oz)  Vitals:    07/26/22 1900 07/26/22 2230 07/27/22 0600 07/27/22 0724   BP: 123/57 117/55  152/67   BP Location:  Left arm  Left arm   Pulse: 70 68  78   Resp:    19   Temp: 98 5 °F (36 9 °C) 98 °F (36 7 °C)  98 8 °F (37 1 °C)   TempSrc: Oral   Oral   SpO2: 95% 95%  97%   Weight:   101 kg (221 lb 9 oz)        Intake/Output Summary (Last 24 hours) at 7/27/2022 0758  Last data filed at 7/27/2022 0744  Gross per 24 hour   Intake 250 ml   Output 475 ml   Net -225 ml     General: Awake and alert   Eyes: Conjunctivae pink, sclera anicteric  ENT: Lips and mucous membranes moist  Neck: Supple   Chest: Clear to auscultation bilaterally   CVS: S1 & S2 present, normal rate, regular rhythm, no murmur  Abdomen: Soft, non-tender, non-distended, Bowel sounds normoactive  Extremities: No edema bilaterally   Skin: No rash  Neuro: Awake, alert and oriented  Psych: Mood and affect appropriate   Right chest wall PermCath  No erythema on scrotum, no penile discharge    Medications:    Current Facility-Administered Medications:     acetaminophen (TYLENOL) tablet 650 mg, 650 mg, Oral, Q6H PRN, Levester Sep, CRNP    allopurinol (ZYLOPRIM) tablet 100 mg, 100 mg, Oral, Daily, Levester Sep, CRNP    atorvastatin (LIPITOR) tablet 80 mg, 80 mg, Oral, Daily, Levester Sep, CRNP    carvedilol (COREG) tablet 12 5 mg, 12 5 mg, Oral, BID, Levester Sep, CRNP, 12 5 mg at 07/26/22 1735    cefepime (MAXIPIME) IVPB (premix in dextrose) 1,000 mg 50 mL, 1,000 mg, Intravenous, After Dialysis, Noemí Wilcox MD    clopidogrel (PLAVIX) tablet 75 mg, 75 mg, Oral, Daily, GRANT Rocha    epoetin jessica (EPOGEN,PROCRIT) injection 800 Units, 800 Units, Intravenous, After Dialysis, GRANT Rocha    heparin (porcine) subcutaneous injection 5,000 Units, 5,000 Units, Subcutaneous, Q8H [address], GRANT Rocha, 5,000 Units at 07/27/22 0018    insulin lispro (HumaLOG) 100 units/mL subcutaneous injection 1-5 Units, 1-5 Units, Subcutaneous, HS, GRANT Rocha    insulin lispro (HumaLOG) 100 units/mL subcutaneous injection 1-6 Units, 1-6 Units, Subcutaneous, TID AC **AND** Fingerstick Glucose (POCT), , , TID AC, GRANT Rocha    lidocaine (LIDODERM) 5 % patch 1 patch, 1 patch, Topical, Daily, GRANT Rocha    NIFEdipine (PROCARDIA XL) 24 hr tablet 30 mg, 30 mg, Oral, BID, GRANT Rocha    ondansetron [facility]) injection 4 mg, 4 mg, Intravenous, Q6H PRN, GRANT Rocha    oxyCODONE (ROXICODONE) IR tablet 5 mg, 5 mg, Oral, Q6H PRN, GRANT Rocha, 5 mg at 07/27/22 3597    sevelamer (RENAGEL) tablet 800 mg, 800 mg, Oral, TID With Meals, GRANT Rocha    vancomycin (VANCOCIN) IVPB (premix in dextrose) 1,000 mg 200 mL, 10 mg/kg (Adjusted), Intravenous, Once PRN, GRANT Rocha    Laboratory Results:  Results from last 7 days   Lab Units 07/27/22  0440 07/26/22  0918   WBC Thousand/uL 14 92* 14 89*   HEMOGLOBIN g/dL 9 0* 9 8*   HEMATOCRIT % 27 6* 30 4*   PLATELETS Thousands/uL 287 321   POTASSIUM mmol/L 4 6 4 4   CHLORIDE mmol/L 98 95*   CO2 mmol/L 24 26   BUN mg/dL 52* 52*   CREATININE mg/dL 7 80* 7 84*   CALCIUM mg/dL 8 4 8 6   MAGNESIUM mg/dL  --  2 0   PHOSPHORUS mg/dL 5 1* 5 1*

## 2022-07-27 NOTE — CONSULTS
Consultation - Orthopedics   Britt Loera 60 y o  male MRN: [id number]  Unit/Bed#: [address] [place] [address] Encounter: [id number]      Assessment/Plan     Assessment:  Left hip pain - patient continues to have left groin pain of unclear etiology at this time  He does have severe underlying osteoarthritis  However, he has now developed leukocytosis and fever  Based on exam, there is a low suspicion for a need of septic joint  He was able to tolerate gentle active and passive range of motion without significant discomfort in the left groin  He does have limitations in motion, but these do due to his underlying osteoarthritis  Additionally, there was no obvious fluid collection intra-articularly on the repeat CT scan and no significant changes from the scan performed 3 weeks ago  However, the fact that he continues to have pain and has new symptoms consistent with infection merit further investigation  He does have severe claustrophobia and may not be able to tolerate an MRI  Additionally, an MRI would likely only solidify the knee for an aspiration  Therefore, we have ordered an aspiration of his left hip by Interventional Radiology  If any fluid is stable to be obtained, he should be sent for Gram stain and culture, cell count differential, and crystals  If no fluid is obtained, it makes it very unlikely that this is a septic native joint  We also recommend consulting urology  On exam today, the patient complained of pain primarily in his left testicle  He admits to a history of epididymitis over 20 years ago and reports that his current pain feels very similar    We will follow-up the results of the aspiration to further delineate his plan of care    Plan:  - plan for interventional radiology aspiration of left hip  - if any fluid is obtained, please send for Gram stain and culture, cell count differential, and crystals (orders have been placed)  - ESR for baseline  - recommend urology consult for left testicular pain with history of epididymitis  - weight bear as tolerated left lower extremity  - analgesia p r n   - DVT prophylaxis per primary medical team  - medical management per primary medical team  - orthopedics will continue to follow and make further recommendations when aspirate results are available  At this time, there is low suspicion for a septic native joint    History of Present Illness   Physician Requesting Consult: Lana Stanley DO  Reason for Consult / Principal Problem: Recurrent left hip pain  HPI: Anushka Garcia is a 60y o  year old male who presents with worsening left hip pain  He was last seen 3 weeks ago for the same complaint and was determined to be symptomatic of his severe underlying osteoarthritis  Over the past few days, he began to develop significant fever and chills and worsening pain in the left groin  He presented to the emergency department and was found to have leukocytosis as well  Today, he locates the pain primarily in the groin and left testicle  He has had difficulty with ambulation and general mobility due to his discomfort  However, while in bed he is able to move his left hip without severe pain  He denies any paresthesias radiating down left lower extremity  he denies any recent injuries or falls  He does report a history of epididymitis and states that this pain feels similar  Inpatient consult to Orthopedic Surgery  Consult performed by: Win Guillermo PA-C  Consult ordered by: GRANT Boyd          Review of Systems   Constitutional: Positive for activity change  HENT: Negative  Eyes: Negative  Respiratory: Negative  Cardiovascular: Positive for leg swelling  Gastrointestinal: Negative  Endocrine: Negative  Genitourinary: Negative  Musculoskeletal: Positive for arthralgias, gait problem, joint swelling and myalgias  Skin: Negative  Allergic/Immunologic: Negative  Hematological: Negative  Psychiatric/Behavioral: Negative  Historical Information   Past Medical History:   Diagnosis Date    CKD     Diabetes mellitus ([facility] [address] )     Hypertension     Left toe amputee ([facility] [address] )     TIA (transient ischemic attack)      Past Surgical History:   Procedure Laterality Date    IR TUNNELED DIALYSIS CATHETER PLACEMENT  1/27/2022     Social History   Social History     Substance and Sexual Activity   Alcohol Use Not Currently    Alcohol/week: 0 0 standard drinks    Comment: 0     Social History     Substance and Sexual Activity   Drug Use Never     E-Cigarette/Vaping    E-Cigarette Use Never User      E-Cigarette/Vaping Substances     Social History     Tobacco Use   Smoking Status Never Smoker   Smokeless Tobacco Never Used     Family History: History reviewed  No pertinent family history      Meds/Allergies   all current active meds have been reviewed, current meds:   Current Facility-Administered Medications   Medication Dose Route Frequency    acetaminophen (TYLENOL) tablet 650 mg  650 mg Oral Q6H PRN    allopurinol (ZYLOPRIM) tablet 100 mg  100 mg Oral Daily    atorvastatin (LIPITOR) tablet 80 mg  80 mg Oral Daily    carvedilol (COREG) tablet 12 5 mg  12 5 mg Oral BID    cefepime (MAXIPIME) IVPB (premix in dextrose) 1,000 mg 50 mL  1,000 mg Intravenous After Dialysis    clopidogrel (PLAVIX) tablet 75 mg  75 mg Oral Daily    doxercalciferol (HECTOROL) injection 1 5 mcg  1 5 mcg Intravenous After Dialysis    epoetin jessica (EPOGEN,PROCRIT) injection 800 Units  800 Units Intravenous After Dialysis    famotidine (PEPCID) tablet 10 mg  10 mg Oral Daily    heparin (porcine) subcutaneous injection 5,000 Units  5,000 Units Subcutaneous Q8H [facility]    insulin lispro (HumaLOG) 100 units/mL subcutaneous injection 1-5 Units  1-5 Units Subcutaneous HS    insulin lispro (HumaLOG) 100 units/mL subcutaneous injection 1-6 Units  1-6 Units Subcutaneous TID AC    lidocaine (LIDODERM) 5 % patch 1 patch  1 patch Topical Daily    NIFEdipine (PROCARDIA XL) 24 hr tablet 30 mg  30 mg Oral BID    ondansetron (ZOFRAN) injection 4 mg  4 mg Intravenous Q6H PRN    oxyCODONE (ROXICODONE) IR tablet 5 mg  5 mg Oral Q6H PRN    sevelamer (RENAGEL) tablet 800 mg  800 mg Oral TID With Meals    [START ON 7/28/2022] vancomycin (VANCOCIN) IVPB (premix in dextrose) 1,000 mg 200 mL  10 mg/kg (Adjusted) Intravenous Once PRN    and PTA meds:   Prior to Admission Medications   Prescriptions Last Dose Informant Patient Reported? Taking? NIFEdipine ER (ADALAT CC) 30 MG 24 hr tablet 7/25/2022 at Unknown time  Yes Yes   Sig: Take 30 mg by mouth 2 (two) times a day   acetaminophen (TYLENOL) 325 mg tablet Past Month at Unknown time  No Yes   Sig: Take 2 tablets (650 mg total) by mouth every 6 (six) hours as needed for mild pain, headaches or fever   allopurinol (ZYLOPRIM) 100 mg tablet   Yes No   Sig: Take 100 mg by mouth daily   aspirin 81 mg chewable tablet 7/26/2022 at Unknown time  Yes Yes   Sig: Chew 324 mg as needed for mild pain   atorvastatin (LIPITOR) 80 mg tablet   Yes No   Sig: Take 80 mg by mouth daily   carvedilol (COREG) 6 25 mg tablet 7/25/2022 at Unknown time  Yes Yes   Sig: Take 12 5 mg by mouth 2 (two) times a day   clopidogrel (PLAVIX) 75 mg tablet Not Taking at Unknown time  Yes No   Sig: Take 75 mg by mouth daily   Patient not taking: Reported on 7/26/2022   lidocaine (LIDODERM) 5 % 7/25/2022 at Unknown time  No Yes   Sig: Apply 1 patch topically daily Lower back - Remove & Discard patch within 12 hours or as directed by MD   Patient taking differently: Apply 1 patch topically if needed Lower extremity  - Remove & Discard patch within 12 hours or as directed by MD   linaGLIPtin 5 MG TABS Unknown at Unknown time  No No   Sig: Take 5 mg by mouth in the morning   naloxone (NARCAN) 4 mg/0 1 mL nasal spray Unknown at Unknown time  No No   Sig: Administer 1 spray into a nostril   If no response after 2-3 minutes, give another dose in the other nostril using a new spray  oxyCODONE (ROXICODONE) 5 immediate release tablet Past Week at Unknown time  No Yes   Sig: Take 1 tablet (5 mg total) by mouth every 8 (eight) hours as needed for severe pain or moderate pain for up to 10 doses Max Daily Amount: 15 mg   sevelamer (RENAGEL) 800 mg tablet Past Week at Unknown time  No Yes   Sig: Take 1 tablet (800 mg total) by mouth 3 (three) times a day with meals      Facility-Administered Medications: None     No Known Allergies    Objective   Vitals: Blood pressure 122/59, pulse 75, temperature 98 2 °F (36 8 °C), temperature source Oral, resp  rate 20, weight 101 kg (221 lb 9 oz), SpO2 91 %  ,Body mass index is 29 23 kg/m²  Intake/Output Summary (Last 24 hours) at 7/27/2022 1614  Last data filed at 7/27/2022 1236  Gross per 24 hour   Intake 500 ml   Output 2278 ml   Net -1778 ml     I/O last 24 hours: In: 750 [I V :500; IV Piggyback:250]  Out: 3641 [Urine:775; Other:1503]    Invasive Devices  Report    Peripheral Intravenous Line  Duration           Peripheral IV 07/26/22 Left Antecubital 1 day          Hemodialysis Catheter  Duration           HD Permanent Double Catheter 181 days                Physical Exam  Vitals and nursing note reviewed  Constitutional:       Appearance: Normal appearance  Comments: Patient currently on dialysis   HENT:      Head: Normocephalic and atraumatic  Right Ear: External ear normal       Left Ear: External ear normal       Nose: Nose normal       Mouth/Throat:      Mouth: Mucous membranes are dry  Eyes:      Extraocular Movements: Extraocular movements intact  Cardiovascular:      Rate and Rhythm: Bradycardia present  Pulses: Normal pulses  Pulmonary:      Effort: Pulmonary effort is normal    Abdominal:      Palpations: Abdomen is soft  Musculoskeletal:      Comments: See ortho exam   Skin:     General: Skin is warm and dry  Neurological:      General: No focal deficit present        Mental Status: He is alert  Psychiatric:         Mood and Affect: Mood normal          Behavior: Behavior normal          Thought Content: Thought content normal          Judgment: Judgment normal        Left Hip Exam     Tenderness   The patient is experiencing no tenderness  Range of Motion   Abduction:  25 abnormal   Adduction:  15 abnormal   Extension:  0 normal   Flexion:  90 abnormal   External rotation:  40 abnormal   Internal rotation: 5 abnormal     Muscle Strength   Abduction: 5/5   Adduction: 5/5   Flexion: 5/5     Tests   ALEJANDRO: positive  Elder: negative    Other   Erythema: absent  Scars: absent  Sensation: normal  Pulse: present    Comments:  Negative log roll, [place], Elder  Able to tolerate passive and active ROM with mild discomfort  Thigh and calf soft and nontender  Grossly NVI            Lab Results:   I have personally reviewed pertinent lab results  CBC:   Lab Results   Component Value Date    WBC 14 92 (H) 07/27/2022    HGB 9 0 (L) 07/27/2022    HCT 27 6 (L) 07/27/2022    MCV 91 07/27/2022     07/27/2022    MCH 29 8 07/27/2022    MCHC 32 6 07/27/2022    RDW 15 0 07/27/2022    MPV 9 8 07/27/2022     CMP:   Lab Results   Component Value Date    SODIUM 136 07/27/2022    CL 98 07/27/2022    CO2 24 07/27/2022    BUN 52 (H) 07/27/2022    CREATININE 7 80 (H) 07/27/2022    CALCIUM 8 4 07/27/2022    EGFR 6 07/27/2022     PT/INR: No results found for: PT, INR  ESR: No results found for: SEDRATE    CRP:   Lab Results   Component Value Date     2 (H) 07/26/2022        Imaging Studies: I have personally reviewed pertinent films in PACS   CT abdomen pelvis wo contrast    Result Date: 7/26/2022  Narrative: CT ABDOMEN AND PELVIS WITHOUT IV CONTRAST INDICATION:   LLQ abdominal pain groin pain   COMPARISON:  7/6/2022 TECHNIQUE:  CT examination of the abdomen and pelvis was performed without intravenous contrast  Axial, sagittal, and coronal 2D reformatted images were created from the source data and submitted for interpretation  Radiation dose length product (DLP) for this visit:  724 mGy-cm   This examination, like all CT scans performed in the [facility], was performed utilizing techniques to minimize radiation dose exposure, including the use of iterative reconstruction and automated exposure control  Enteric contrast was administered  FINDINGS: ABDOMEN LOWER CHEST:  No clinically significant abnormality identified in the visualized lower chest  LIVER/BILIARY TREE:  Unremarkable  GALLBLADDER:  No calcified gallstones  No pericholecystic inflammatory change  SPLEEN:  Unremarkable  PANCREAS:  Unremarkable  ADRENAL GLANDS:  Unremarkable  KIDNEYS/URETERS:  No hydronephrosis or urinary tract calculus  One or more sharply circumscribed subcentimeter renal hypodensities are present, too small to accurately characterize, and statistically most likely benign findings  According to recent literature (Radiology 2019) no further workup of these findings is recommended  STOMACH AND BOWEL:  There is colonic diverticulosis without evidence of acute diverticulitis  APPENDIX:  A normal appendix was visualized  ABDOMINOPELVIC CAVITY:  No ascites  No pneumoperitoneum  No lymphadenopathy  VESSELS:  Age-appropriate atherosclerotic disease noted  There are a few mildly prominent para-aortic lymph nodes identified similar to prior exam   Fatty yocasta noted throughout  PELVIS REPRODUCTIVE ORGANS:  The prostate is enlarged  URINARY BLADDER:  Unremarkable  ABDOMINAL WALL/INGUINAL REGIONS:  Mildly prominent left inguinal nodes demonstrated similar to prior study  OSSEOUS STRUCTURES:  Stable T10 vertebral compression fracture     Impression: 1  No acute inflammatory or infectious process  LV prominent left inguinal lymph nodes are identified similar to prior examination without worrisome features  Similar and also stable para-aortic lymph nodes are identified  2   Prostatomegaly  3   Renal cysts, stable   4  Stable T10 vertebral compression fracture  Workstation performed: [id number]     CT abdomen pelvis wo contrast    Result Date: 7/6/2022  Narrative: CT ABDOMEN AND PELVIS WITHOUT IV CONTRAST INDICATION:   LLQ abdominal pain left inguinal pain  COMPARISON:  1/23/2022  TECHNIQUE:  CT examination of the abdomen and pelvis was performed without intravenous contrast  This examination was performed without intravenous contrast in the context of the critical nationwide Omnipaque shortage  Axial, sagittal, and coronal 2D reformatted images were created from the source data and submitted for interpretation  Radiation dose length product (DLP) for this visit:  994 39 mGy-cm   This examination, like all CT scans performed in the [facility], was performed utilizing techniques to minimize radiation dose exposure, including the use of iterative  reconstruction and automated exposure control  Enteric contrast was not administered  FINDINGS: ABDOMEN LOWER CHEST:  No clinically significant abnormality identified in the visualized lower chest  LIVER/BILIARY TREE:  Unremarkable  GALLBLADDER:  No calcified gallstones  No pericholecystic inflammatory change  SPLEEN:  Unremarkable  PANCREAS:  Unremarkable  ADRENAL GLANDS:  Unremarkable  KIDNEYS/URETERS:  There are small probable bilateral renal cysts some of which appear hyperdense as before  These can be further evaluated with ultrasound  No CT evidence of nephrolithiasis or hydronephrosis  No hydroureter  STOMACH AND BOWEL:  There is no bowel obstruction  There is mild colonic diverticulosis without acute diverticulitis  No focal inflammatory process  APPENDIX:  A normal appendix was visualized  ABDOMINOPELVIC CAVITY:  No ascites  No pneumoperitoneum  No lymphadenopathy  Small fat-containing retroperitoneal densities possibly small fat-containing lymph nodes are stable and unchanged  Stable small bilateral groin lymph nodes   VESSELS:  The abdominal is calcified but normal in caliber  No retroperitoneal hematoma  PELVIS REPRODUCTIVE ORGANS:  The prostate is enlarged  URINARY BLADDER:  Minimal bladder wall thickening  Underlying cystitis not excluded  ABDOMINAL WALL/INGUINAL REGIONS:  Unremarkable  OSSEOUS STRUCTURES:  Healing fracture of the T10 vertebral body noted  There is moderate to advanced left hip and moderate right hip osteoarthrosis  Impression: No bowel obstruction  Colonic diverticulosis without acute diverticulitis  Healing T10 vertebral body fracture  Minimal bladder wall thickening  Underlying cystitis not excluded  Additional findings as above  Workstation performed: [id number]     XR shoulder 2+ vw right    Result Date: 7/26/2022  Narrative: RIGHT SHOULDER INDICATION:   right shoulder pain  COMPARISON:  None VIEWS:  XR SHOULDER 2+ VW RIGHT FINDINGS: There is no acute fracture or dislocation  Osteoarthritis moderate at the acromioclavicular joint and mild the glenohumeral joint  No lytic or blastic osseous lesion  Soft tissues are unremarkable  Central venous catheter noted  Impression: No acute osseous abnormality  Degenerative changes as described  Workstation performed: [id number]     XR hip/pelv 2-3 vws left if performed    Result Date: 7/11/2022  Narrative: LEFT HIP INDICATION:   Increasing left hip pain  No reported trauma  COMPARISON:  None VIEWS:  XR HIP/PELV 2-3 VWS LEFT  W PELVIS IF PERFORMED FINDINGS: Bones are intact  Preserved alignment  No suspicious lytic or blastic bone lesion  Left greater than right hip osteoarthritis  Spine degenerative change  Soft tissue vascular calcification  Impression: No acute osseous abnormality  Degenerative changes  Workstation performed: [id number]     US scrotum and testicles    Result Date: 7/26/2022  Narrative: SCROTAL ULTRASOUND INDICATION:    left testicular pain   COMPARISON: None TECHNIQUE:   Ultrasound the scrotal contents was performed with a high frequency linear transducer utilizing volumetric sweep imaging as well as standard still image techniques  Imaging performed in longitudinal and transverse orientation  Color and spectral Doppler evaluation also performed bilaterally  FINDINGS: TESTES: Testes are symmetric and normal in size  RIGHT testis = 4 2 x 2 3 x 3 1 cm  Volume 15 4 mL Normal contour with homogeneous smooth echotexture  No intratesticular mass lesion or calcifications  LEFT testis = 4 1 x 2 3 x 2 6 cm  Volume 12 9 mL Normal contour with homogeneous smooth echotexture  No intratesticular mass lesion or calcifications  Doppler flow within both testes is present and symmetric  EPIDIDYMIDES: Normal Size  Doppler ultrasound demonstrates normal blood flow  There are small epididymal cyst(s) bilaterally  Otherwise unremarkable  HYDROCELE:  No significant fluid present  VARICOCELE:  None present  SCROTUM:  Scrotal thickness and appearance within normal limits  No evidence for extratesticular mass or hernia demonstrated  Impression:  No acute abnormality  Workstation performed: [id number]     VAS lower limb venous duplex study, complete bilateral    Result Date: 7/8/2022  Narrative:  [facility] REPORT CLINICAL: Indications: Patient presents with left lower extremity pain  CONCLUSION: Impression: RIGHT LOWER LIMB: No evidence of acute or chronic deep vein thrombosis  No evidence of superficial thrombophlebitis noted  Doppler evaluation shows a normal response to augmentation maneuvers  Popliteal, posterior tibial and anterior tibial arterial Doppler waveforms are triphasic  LEFT LOWER LIMB: No evidence of acute or chronic deep vein thrombosis  No evidence of superficial thrombophlebitis noted  Doppler evaluation shows a normal response to augmentation maneuvers  Popliteal, posterior tibial and anterior tibial arterial Doppler waveforms are triphasic  Tech Note: There is an echogenic structure located in the inguinal region   This structure measures approximately 1 75cm x 3 cm and is consistent with enlarged lymph node and channels  Technically difficult/limited study secondary to inability to reposition  SIGNATURE: Electronically Signed by: Merary Nolasco MD, [address] on 2022-07-08 09:53:58 AM    CT lower extremity wo contrast left    Result Date: 7/11/2022  Narrative: CT left hip and femur  without IV contrast INDICATION: pain  Per ED notes, presented with worsening left hip/inguinal pain, severe left hip osteoarthritis per patient  No reported trauma  COMPARISON: 7/6/2022 abdominopelvic CT TECHNIQUE: CT examination of the above was performed  This examination was performed without intravenous contrast in the context of the critical nationwide Omnipaque shortage  This examination, like all CT scans performed in the [facility], was performed utilizing techniques to minimize radiation dose exposure, including the use of iterative reconstruction and automated exposure control software  Sagittal and coronal two dimensional reconstructed images were also submitted for interpretation  Rad dose  965 mGy-cm FINDINGS: OSSEOUS STRUCTURES:  Intact  Preserved alignment  Moderate to marked left hip and knee osteophytes  Superior left hip joint space narrowing  Small knee suprapatella effusion  No suspicious bone lesions  VISUALIZED MUSCULATURE:  No acute findings  Mild muscle fatty infiltration, predominantly gluteus, tensor fasciae latae and hamstrings, particularly semimembranosus  SOFT TISSUES:  Vascular calcification  No acute findings  OTHER PERTINENT FINDINGS:  None  Impression: No acute findings  Left hip and knee osteoarthritis  Workstation performed: [id number]     Dr Yefri Matt and I reviewed the available pertinent images which show left hip osteoarthritis with narrowing, osteophyte formation, and sclerosis  There is no obvious intra-articular fluid collection    The CT scan from this admission does not look significantly different than the last admission      EKG, Pathology, and Other Studies: I have personally reviewed pertinent films in PACS  VTE Prophylaxis: Sequential compression device [place])     Code Status: Level 1 - Full Code  Advance Directive and Living Will:      Power of :    POLST:      Darrell Lundy PA-C

## 2022-07-27 NOTE — ASSESSMENT & PLAN NOTE
Lab Results   Component Value Date    HGBA1C 6 6 (H) 07/07/2022       Recent Labs     07/27/22  1556 07/27/22  2118 07/28/22  0727 07/28/22  1116   POCGLU 119 169* 217* 195*       Blood Sugar Average: Last 72 hrs:  (P) 151 875   · Not taking his Linagliptin as he does not have any further tablets  · Now with steroid induced hyperglycemia  · Added mealtime coverage  · Monitor Accu-Cheks AC + HS with lispro insulin sliding scale coverage

## 2022-07-27 NOTE — ASSESSMENT & PLAN NOTE
Presents with left testicular pain which has been present for several weeks  Saw an outpatient provider who prescribed cefepime after dialysis for presumed epididymitis  Patient states this pain feels similar to his prior history of epididymitis    · No signs of epididymitis on imaging  · Spoke with Infectious Disease, possible referred pain from severe osteoarthritis of the left hip  · Spoke with Urology, could possibly be related to osteoarthritis causing neuropathy with referred pain to the left testicle or inflamed left epididymal cyst due to trauma  · Recommend anti-inflammatories and tight-fitting underwear for few days  · No need for further antibiotics

## 2022-07-27 NOTE — ASSESSMENT & PLAN NOTE
As evidenced by leukocytosis and fever, likely reactive to severe osteoarthritis  · Will need to rule out infection  · Blood culture show no growth at 24 hours  · IR consulted for possible left hip aspiration  · Continue vancomycin and cefepime and follow up cultures  · Trial prednisone  · Trend fever curve and CBC with diff

## 2022-07-27 NOTE — CASE MANAGEMENT
Case Management Assessment & Discharge Planning Note    Patient name Anushka Garcia  [facility] MRN [id number]  : 1960 Date 2022       Current Admission Date: 2022  Current Admission Diagnosis:Right shoulder pain   Patient Active Problem List    Diagnosis Date Noted    Pain in left testicle 2022    Right shoulder pain 2022    Leukocytosis 2022    Left hip pain 2022    Hemodialysis status ([facility] [place] [address] )     Hypervolemia     Anemia 2022    ESRD (end stage renal disease) ([facility] [address] ) 2022    Type 2 diabetes mellitus, without long-term current use of insulin ([facility] )     Hypertension     History of TIA (transient ischemic attack)     T10 vertebral fracture (HCC)       LOS (days): 0  Geometric Mean LOS (GMLOS) (days):   Days to GMLOS:     OBJECTIVE:              Current admission status: Observation       Preferred Pharmacy:   [facility] [address] [place], [place] -  [place] [id number]  Phone: [phone] Fax: [phone]    Primary Care Provider: Juan Manuel Fuentes MD    Primary Insurance: MEDICARE  Secondary Insurance:     ASSESSMENT:  [address] Representative - Friend   Primary Phone: [phone] (Mobile)            Readmission Root Cause  30 Day Readmission: No    Patient Information  Admitted from[de-identified] Home  Mental Status: Alert  During Assessment patient was accompanied by: Not accompanied during assessment  Assessment information provided by[de-identified] Patient  Primary Caregiver: Self  Support Systems: Self, Family members  [place] of Residence: Other (specify in comment box) [facility])  What city do you live in?: [facility] entry access options   Select all that apply : Stairs  Number of steps to enter home : 7 (REPORTS 6-8 NAZARIO)  Do the steps have railings?: Yes  Type of Current Residence: [address] [place] [address] home  Upon entering residence, is there a bedroom on the main floor (no further steps)?: Yes  Upon entering residence, is there a bathroom on the main floor (no further steps)?: Yes  In the last 12 months, was there a time when you were not able to pay the mortgage or rent on time?: No  In the last 12 months, how many places have you lived?: 2  In the last 12 months, was there a time when you did not have a steady place to sleep or slept in a shelter (including now)?: No  Homeless/housing insecurity resource given?: No  Living Arrangements: Lives Alone  Is patient a ?: No    Activities of Daily Living Prior to Admission  Functional Status: Independent  Completes ADLs independently?: Yes  Ambulates independently?: Yes  Does patient use assisted devices?: No  Does patient currently own DME?: No  Does patient have a history of Outpatient Therapy (PT/OT)?: No  Does the patient have a history of Short-Term Rehab?: Yes (@ [facility])  Does patient have a history of HHC?: No  Does patient currently have [facility]?: No    Patient Information Continued  Income Source: SSI/SSD  Does patient have prescription coverage?: Yes  Within the past 12 months, you worried that your food would run out before you got the money to buy more : Never true  Within the past 12 months, the food you bought just didn't last and you didn't have money to get more : Never true  Food insecurity resource given?: No  Does patient receive dialysis treatments?: Yes (@ [facility], TTS @ 1115)  Does patient have a history of substance abuse?: No  Does patient have a history of Mental Health Diagnosis?: No    PHQ 2/9 Screening   Reviewed PHQ 2/9 Depression Screening Score?: No    Means of Transportation  Means of Transport to Appts[de-identified] Drives Self  In the past 12 months, has lack of transportation kept you from medical appointments or from getting medications?: No  In the past 12 months, has lack of transportation kept you from meetings, work, or from getting things needed for daily living?: No  Was application for public transport provided?: No    DISCHARGE DETAILS:    Discharge planning discussed with[de-identified] Patient  Freedom of Choice: Yes     CM contacted family/caregiver?: No- see comments (Declined call to friend or daughter   Patient reports that he will contact family with updates )  Were Treatment Team discharge recommendations reviewed with patient/caregiver?: Yes  Did patient/caregiver verbalize understanding of patient care needs?: Yes  Were patient/caregiver advised of the risks associated with not following Treatment Team discharge recommendations?: Yes    [address] [place] [address]         Is the patient interested in [facility] at discharge?: No    DME Referral Provided  Referral made for DME?: No    Would you like to participate in our [facility] service program?  : No - Declined    Treatment Team Recommendation: Home  Discharge Destination Plan[de-identified] Home  Transport at Discharge : [address] [place] [address]

## 2022-07-28 ENCOUNTER — APPOINTMENT (INPATIENT)
Dept: DIALYSIS | Facility: HOSPITAL | Age: 62
DRG: 553 | End: 2022-07-28
Payer: MEDICARE

## 2022-07-28 PROBLEM — N50.819 ORCHALGIA: Status: ACTIVE | Noted: 2022-07-26

## 2022-07-28 LAB
ANION GAP SERPL CALCULATED.3IONS-SCNC: 13 MMOL/L (ref 4–13)
BASOPHILS # BLD AUTO: 0.02 THOUSANDS/ΜL (ref 0–0.1)
BASOPHILS NFR BLD AUTO: 0 % (ref 0–1)
BUN SERPL-MCNC: 29 MG/DL (ref 5–25)
CALCIUM SERPL-MCNC: 8.8 MG/DL (ref 8.3–10.1)
CHLORIDE SERPL-SCNC: 93 MMOL/L (ref 96–108)
CO2 SERPL-SCNC: 26 MMOL/L (ref 21–32)
CREAT SERPL-MCNC: 5.17 MG/DL (ref 0.6–1.3)
CRP SERPL QL: 255.6 MG/L
EOSINOPHIL # BLD AUTO: 0 THOUSAND/ΜL (ref 0–0.61)
EOSINOPHIL NFR BLD AUTO: 0 % (ref 0–6)
ERYTHROCYTE [DISTWIDTH] IN BLOOD BY AUTOMATED COUNT: 14.6 % (ref 11.6–15.1)
GFR SERPL CREATININE-BSD FRML MDRD: 10 ML/MIN/1.73SQ M
GLUCOSE SERPL-MCNC: 176 MG/DL (ref 65–140)
GLUCOSE SERPL-MCNC: 195 MG/DL (ref 65–140)
GLUCOSE SERPL-MCNC: 199 MG/DL (ref 65–140)
GLUCOSE SERPL-MCNC: 217 MG/DL (ref 65–140)
GLUCOSE SERPL-MCNC: 307 MG/DL (ref 65–140)
HCT VFR BLD AUTO: 31.5 % (ref 36.5–49.3)
HGB BLD-MCNC: 10.2 G/DL (ref 12–17)
IMM GRANULOCYTES # BLD AUTO: 0.26 THOUSAND/UL (ref 0–0.2)
IMM GRANULOCYTES NFR BLD AUTO: 1 % (ref 0–2)
LYMPHOCYTES # BLD AUTO: 0.8 THOUSANDS/ΜL (ref 0.6–4.47)
LYMPHOCYTES NFR BLD AUTO: 4 % (ref 14–44)
MCH RBC QN AUTO: 29.4 PG (ref 26.8–34.3)
MCHC RBC AUTO-ENTMCNC: 32.4 G/DL (ref 31.4–37.4)
MCV RBC AUTO: 91 FL (ref 82–98)
MONOCYTES # BLD AUTO: 0.61 THOUSAND/ΜL (ref 0.17–1.22)
MONOCYTES NFR BLD AUTO: 3 % (ref 4–12)
NEUTROPHILS # BLD AUTO: 16.58 THOUSANDS/ΜL (ref 1.85–7.62)
NEUTS SEG NFR BLD AUTO: 92 % (ref 43–75)
NRBC BLD AUTO-RTO: 0 /100 WBCS
PHOSPHATE SERPL-MCNC: 4.8 MG/DL (ref 2.3–4.1)
PLATELET # BLD AUTO: 332 THOUSANDS/UL (ref 149–390)
PMV BLD AUTO: 9.8 FL (ref 8.9–12.7)
POTASSIUM SERPL-SCNC: 4.3 MMOL/L (ref 3.5–5.3)
RBC # BLD AUTO: 3.47 MILLION/UL (ref 3.88–5.62)
SODIUM SERPL-SCNC: 132 MMOL/L (ref 135–147)
VANCOMYCIN TROUGH SERPL-MCNC: 16.9 UG/ML (ref 10–20)
WBC # BLD AUTO: 18.27 THOUSAND/UL (ref 4.31–10.16)

## 2022-07-28 PROCEDURE — 0S9B3ZX DRAINAGE OF LEFT HIP JOINT, PERCUTANEOUS APPROACH, DIAGNOSTIC: ICD-10-PCS | Performed by: RADIOLOGY

## 2022-07-28 PROCEDURE — 99232 SBSQ HOSP IP/OBS MODERATE 35: CPT | Performed by: NURSE PRACTITIONER

## 2022-07-28 PROCEDURE — 85025 COMPLETE CBC W/AUTO DIFF WBC: CPT | Performed by: NURSE PRACTITIONER

## 2022-07-28 PROCEDURE — 80048 BASIC METABOLIC PNL TOTAL CA: CPT | Performed by: INTERNAL MEDICINE

## 2022-07-28 PROCEDURE — 80202 ASSAY OF VANCOMYCIN: CPT | Performed by: NURSE PRACTITIONER

## 2022-07-28 PROCEDURE — 99232 SBSQ HOSP IP/OBS MODERATE 35: CPT | Performed by: INTERNAL MEDICINE

## 2022-07-28 PROCEDURE — 82948 REAGENT STRIP/BLOOD GLUCOSE: CPT

## 2022-07-28 PROCEDURE — 97161 PT EVAL LOW COMPLEX 20 MIN: CPT

## 2022-07-28 PROCEDURE — 86140 C-REACTIVE PROTEIN: CPT | Performed by: NURSE PRACTITIONER

## 2022-07-28 PROCEDURE — 84100 ASSAY OF PHOSPHORUS: CPT | Performed by: INTERNAL MEDICINE

## 2022-07-28 RX ORDER — INSULIN LISPRO 100 [IU]/ML
2 INJECTION, SOLUTION INTRAVENOUS; SUBCUTANEOUS
Status: DISCONTINUED | OUTPATIENT
Start: 2022-07-28 | End: 2022-07-30

## 2022-07-28 RX ORDER — OXYCODONE HYDROCHLORIDE 5 MG/1
2.5 TABLET ORAL EVERY 8 HOURS PRN
Status: DISCONTINUED | OUTPATIENT
Start: 2022-07-28 | End: 2022-07-31 | Stop reason: HOSPADM

## 2022-07-28 RX ORDER — METHOCARBAMOL 500 MG/1
500 TABLET, FILM COATED ORAL EVERY 12 HOURS PRN
Status: DISCONTINUED | OUTPATIENT
Start: 2022-07-28 | End: 2022-07-31 | Stop reason: HOSPADM

## 2022-07-28 RX ORDER — POLYETHYLENE GLYCOL 3350 17 G/17G
17 POWDER, FOR SOLUTION ORAL DAILY PRN
Status: DISCONTINUED | OUTPATIENT
Start: 2022-07-28 | End: 2022-07-31 | Stop reason: HOSPADM

## 2022-07-28 RX ORDER — DOCUSATE SODIUM 100 MG/1
100 CAPSULE, LIQUID FILLED ORAL 2 TIMES DAILY
Status: DISCONTINUED | OUTPATIENT
Start: 2022-07-28 | End: 2022-07-31 | Stop reason: HOSPADM

## 2022-07-28 RX ORDER — PREDNISONE 20 MG/1
40 TABLET ORAL DAILY
Status: DISCONTINUED | OUTPATIENT
Start: 2022-07-29 | End: 2022-07-31 | Stop reason: HOSPADM

## 2022-07-28 RX ADMIN — HEPARIN SODIUM 5000 UNITS: 5000 INJECTION INTRAVENOUS; SUBCUTANEOUS at 09:00

## 2022-07-28 RX ADMIN — INSULIN LISPRO 2 UNITS: 100 INJECTION, SOLUTION INTRAVENOUS; SUBCUTANEOUS at 11:41

## 2022-07-28 RX ADMIN — INSULIN LISPRO 3 UNITS: 100 INJECTION, SOLUTION INTRAVENOUS; SUBCUTANEOUS at 21:20

## 2022-07-28 RX ADMIN — ATORVASTATIN CALCIUM 80 MG: 80 TABLET ORAL at 08:59

## 2022-07-28 RX ADMIN — HEPARIN SODIUM 5000 UNITS: 5000 INJECTION INTRAVENOUS; SUBCUTANEOUS at 16:52

## 2022-07-28 RX ADMIN — CARVEDILOL 12.5 MG: 12.5 TABLET, FILM COATED ORAL at 17:07

## 2022-07-28 RX ADMIN — INSULIN LISPRO 2 UNITS: 100 INJECTION, SOLUTION INTRAVENOUS; SUBCUTANEOUS at 09:02

## 2022-07-28 RX ADMIN — INSULIN LISPRO 2 UNITS: 100 INJECTION, SOLUTION INTRAVENOUS; SUBCUTANEOUS at 16:51

## 2022-07-28 RX ADMIN — DOCUSATE SODIUM 100 MG: 100 CAPSULE, LIQUID FILLED ORAL at 11:40

## 2022-07-28 RX ADMIN — SEVELAMER HYDROCHLORIDE 800 MG: 800 TABLET, FILM COATED PARENTERAL at 09:06

## 2022-07-28 RX ADMIN — HEPARIN SODIUM 5000 UNITS: 5000 INJECTION INTRAVENOUS; SUBCUTANEOUS at 23:52

## 2022-07-28 RX ADMIN — CLOPIDOGREL BISULFATE 75 MG: 75 TABLET ORAL at 08:57

## 2022-07-28 RX ADMIN — PREDNISONE 50 MG: 20 TABLET ORAL at 08:58

## 2022-07-28 RX ADMIN — OXYCODONE HYDROCHLORIDE AND ACETAMINOPHEN 1 TABLET: 5; 325 TABLET ORAL at 02:52

## 2022-07-28 RX ADMIN — ALLOPURINOL 100 MG: 100 TABLET ORAL at 08:59

## 2022-07-28 RX ADMIN — OXYCODONE HYDROCHLORIDE AND ACETAMINOPHEN 1 TABLET: 5; 325 TABLET ORAL at 23:50

## 2022-07-28 RX ADMIN — INSULIN LISPRO 1 UNITS: 100 INJECTION, SOLUTION INTRAVENOUS; SUBCUTANEOUS at 16:52

## 2022-07-28 RX ADMIN — FAMOTIDINE 10 MG: 20 TABLET ORAL at 08:59

## 2022-07-28 NOTE — PHYSICAL THERAPY NOTE
PT EVALUATION       07/28/22 0940   PT Last Visit   PT Visit Date 07/28/22   Note Type   Note type Evaluation   Pain Assessment   Pain Assessment Tool 0-10   Pain Score 3   Pain Location/Orientation Orientation: Left; Location: Hip  (Groin)   Pain Onset/Description Onset: Ongoing   Patient's Stated Pain Goal No pain   Hospital Pain Intervention(s) Repositioned   Restrictions/Precautions   Weight Bearing Precautions Per Order Yes   LLE Weight Bearing Per Order WBAT   Braces or Orthoses TLSO  (Order placed for TLSO - per discussion with patient received TLSO February 2022 and wore up to 6 weeks ago - now pain free  Refusing TLSO  Physician notified )   Other Precautions Pain   Home Living   Type of [address] Two level;Stairs to enter with rails  (6 NAZARIO)   Home Equipment Walker  (TLSO)   Additional Comments Pt reports sister sometimes lives with him but mostly lives alone  Prior Function   Level of [place] Independent with ADLs and functional mobility   Lives With Alone   Receives Help From Family   ADL Assistance Independent   IADLs Independent   Vocational On disability   General   Additional Pertinent History Pt is a 60year-old male who was admitted to the hospital on 7/26/22 due to testicular pain     Family/Caregiver Present No   Cognition   Overall Cognitive Status WFL   Arousal/Participation Alert   Orientation Level Oriented X4   Memory Within functional limits   Following Commands Follows all commands and directions without difficulty   Subjective   Subjective "I am not here for my back i'm here for my groin "   RUE Assessment   RUE Assessment WFL   LUE Assessment   LUE Assessment WFL   RLE Assessment   RLE Assessment WFL   LLE Assessment   LLE Assessment WFL   Bed Mobility   Rolling R 7  Independent   Rolling L 7  Independent   Supine to Sit 7  Independent   Sit to Supine 7  Independent   Transfers   Sit to Stand 7  Independent   Stand to Sit 7  Independent   Ambulation/Elevation Gait pattern Antalgic  (Ambulates with minor antalgic gait pattern - chronic due to charcot foot L)   Gait Assistance 5  Supervision  (progressing to IND)   Assistive Device None   Distance 300 feet   Stair Management Assistance 7  Independent   Additional items Verbal cues   Stair Management Technique Step to pattern  (Due to chronic L foot pain)   Number of Stairs 8   Balance   Static Sitting Good   Dynamic Sitting Good   Static Standing Fair +   Dynamic Standing Fair   Ambulatory Fair   Activity Tolerance   Activity Tolerance Patient tolerated treatment well   Nurse Made Aware yes CURLY Tyson   Assessment   Prognosis Good   Problem List   (Minor gait deviations - chronic  At baseline functional level with no acute PT needs at this time)   Goals   Patient Goals To go home   Plan   Treatment/Interventions   (N/A no acute PT needs at this time)   PT Frequency   (D/C PT)   Recommendation   PT Discharge Recommendation No rehabilitation needs   AM-PAC Basic Mobility Inpatient   Turning in Bed Without Bedrails 4   Lying on Back to Sitting on Edge of Flat Bed 4   Moving Bed to Chair 4   Standing Up From Chair 4   Walk in Room 3   Climb 3-5 Stairs 3   Basic Mobility Inpatient Raw Score 22   Basic Mobility Standardized Score 47 4   Highest Level Of Mobility   JH-HLM Goal 7: Walk 25 feet or more   JH-HLM Achieved 8: Walk 250 feet ot more   Barthel Index   Feeding 10   Bathing 5   Grooming Score 5   Dressing Score 10   Bladder Score 10   Bowels Score 10   Toilet Use Score 10   Transfers (Bed/Chair) Score 10   Mobility (Level Surface) Score 15   Stairs Score 10   Barthel Index Score 95   End of Consult   Patient Position at End of Consult Supine; All needs within reach   [facility] Insurance Number  Lidia Sawyer [id number]

## 2022-07-28 NOTE — PLAN OF CARE
Problem: Potential for Falls  Goal: Patient will remain free of falls  Description: INTERVENTIONS:  - Educate patient/family on patient safety including physical limitations  - Instruct patient to call for assistance with activity   - Consult OT/PT to assist with strengthening/mobility   - Keep Call bell within reach  - Keep bed low and locked with side rails adjusted as appropriate  - Keep care items and personal belongings within reach  - Initiate and maintain comfort rounds  - Apply yellow socks and bracelet for high fall risk patients  - Consider moving patient to room near nurses station  Outcome: Progressing     Problem: PAIN - ADULT  Goal: Verbalizes/displays adequate comfort level or baseline comfort level  Description: Interventions:  - Encourage patient to monitor pain and request assistance  - Assess pain using appropriate pain scale  - Administer analgesics based on type and severity of pain and evaluate response  - Implement non-pharmacological measures as appropriate and evaluate response  - Consider cultural and social influences on pain and pain management  - Notify physician/advanced practitioner if interventions unsuccessful or patient reports new pain  Outcome: Progressing     Problem: INFECTION - ADULT  Goal: Absence or prevention of progression during hospitalization  Description: INTERVENTIONS:  - Assess and monitor for signs and symptoms of infection  - Monitor lab/diagnostic results  - Monitor all insertion sites, i e  indwelling lines, tubes, and drains  - Monitor endotracheal if appropriate and nasal secretions for changes in amount and color  - [place] appropriate cooling/warming therapies per order  - Administer medications as ordered  - Instruct and encourage patient and family to use good hand hygiene technique  - Identify and instruct in appropriate isolation precautions for identified infection/condition  Outcome: Progressing  Goal: Absence of fever/infection during neutropenic period  Description: INTERVENTIONS:  - Monitor WBC    Outcome: Progressing

## 2022-07-28 NOTE — CASE MANAGEMENT
Case Management Progress Note    Patient name Tyrone Flight  Location 3 [facility] [place] [address] MRN [id number]  : 1960 Date 2022       LOS (days): 1  Geometric Mean LOS (GMLOS) (days): 3 00  Days to [facility]        OBJECTIVE:        Current admission status: Inpatient  Preferred Pharmacy:   [facility] [address] [facility] [facility]  [place] [id number]  Phone: [phone] Fax: [phone]    Primary Care Provider: Julius Teague MD    Primary Insurance: MEDICARE  Secondary Insurance:     PROGRESS NOTE:    CM s/w Yuni Hernández @ [facility] @ [phone] regarding OP HD chair time confirmation  Yuni Hernández confirmed patient is TTS @ 1115 chair time  Yuni Hernández made aware patient is a tentative discharge tomorrow and plan at this time will be to resume OP HD starting Saturday (2022) morning  CM will continue to follow at this time

## 2022-07-28 NOTE — QUICK NOTE
I was notified this morning by the medical team that patient refused an aspiration of his left hip  Therefore, we cannot definitively determine whether or not a septic native joint is present  Again, we feel that it is unlikely  He cannot undergo an MRI due to his severe claustrophobia  Plus, an MRI would likely only further solidify the need for an aspiration, which he refused  Therefore, we agree with medical team plan to treat his severe underlying left hip osteoarthritis  He seems to be doing better with the prednisone  Orthopedics will sign off at this time but will follow his progress peripherally  Please feel free to re-consult if there is any acute changes in his symptoms      Agapito Jiménez PA-C

## 2022-07-28 NOTE — PLAN OF CARE
Serum potassium 4 3, on 3 K bath  Will run even as discussed with Dr Kalyan Guadarrama via tiger connect  Problem: METABOLIC, FLUID AND ELECTROLYTES - ADULT  Goal: Fluid balance maintained  Description: INTERVENTIONS:  - Monitor labs   - Monitor I/O and WT  - Instruct patient on fluid and nutrition as appropriate  - Assess for signs & symptoms of volume excess or deficit  Outcome: Progressing  Goal: Glucose maintained within target range  Description: INTERVENTIONS:  - Monitor Blood Glucose as ordered  - Assess for signs and symptoms of hyperglycemia and hypoglycemia  - Administer ordered medications to maintain glucose within target range  - Assess nutritional intake and initiate nutrition service referral as needed  Outcome: Progressing   Post-Dialysis RN Treatment Note    Blood Pressure:  Pre 125/61 mm/Hg  Post 152/79 mmHg   EDW  104 kg    Weight:  Pre 100 6 kg   Post Not Applicable kg   Mode of weight measurement: Standing Scale   Volume Removed  0 ml    Treatment duration 210 minutes    NS given  No    Treatment shortened?  No   Medications given during Rx None Reported   Estimated Kt/V  None Reported   Access type: Permacath/TDC   Access Issues: No    Report called to primary nurse   Yes        Alonzo Pugh RN

## 2022-07-28 NOTE — PROGRESS NOTES
Progress Note - Urology      Patient: Mo Russell   : 1960 Sex: male   MRN: [id number]     CSN: [id number]  Unit/Bed#: [address] [place] [address]     SUBJECTIVE:   Continues to have hip pain  Going for aspiration      Objective   Vitals: /79   Pulse 87   Temp 99 °F (37 2 °C) (Tympanic)   Resp 16   Wt 99 8 kg (220 lb)   SpO2 93%   BMI 29 03 kg/m²     I/O last 24 hours: In: 710 [I V :710]  Out: 2453 [Urine:450;  Other:]      Physical Exam:   General Alert awake   Normocephalic atraumatic PERRLA  Lungs clear bilaterally  Cardiac normal S1 normal S2  Abdomen soft, flank pain  Extremities no edema      Lab Results: CBC:   Lab Results   Component Value Date    WBC 18 27 (H) 2022    HGB 10 2 (L) 2022    HCT 31 5 (L) 2022    MCV 91 2022     2022    MCH 29 4 2022    MCHC 32 4 2022    RDW 14 6 2022    MPV 9 8 2022    NRBC 0 2022     CMP:   Lab Results   Component Value Date    CL 93 (L) 2022    CO2 26 2022    BUN 29 (H) 2022    CREATININE 5 17 (H) 2022    CALCIUM 8 8 2022    AST 16 2022    ALT 19 2022    ALKPHOS 105 2022    EGFR 10 2022     Urinalysis:   Lab Results   Component Value Date    COLORU Yellow 2022    CLARITYU Clear 2022    SPECGRAV 1 015 2022    PHUR 6 5 2022    LEUKOCYTESUR Negative 2022    NITRITE Negative 2022    GLUCOSEU 100 (1/10%) (A) 2022    KETONESU Negative 2022    BILIRUBINUR Negative 2022    BLOODU Trace-Intact (A) 2022     Urine Culture: No results found for: URINECX  PSA: No results found for: PSA      Assessment/ Plan:  Left orchalgia  Neuropathy  Epididymal cyst  No infection noted          James Mon MD

## 2022-07-28 NOTE — PROGRESS NOTES
[address]  Progress Note - Yenni Regalado 1960, 60 y o  male MRN: [id number]  Unit/Bed#: [address] [place] [address] Encounter: [id number]  Primary Care Provider: Rosa Abbott MD   Date and time admitted to hospital: 7/26/2022  8:11 AM    * Osteoarthritis of left hip  Assessment & Plan  Left hip osteoarthritis as noted on imaging  Left hip pain could be the cause of his left testicular pain  · Appreciate orthopedic input  · CRP and ESR elevated  · Low suspicion for acute infection but will attempt left hip aspiration by IR if possible  · Initially patient refused left hip aspiration secondary to severe pain, now amenable to aspiration  · Spoke with IR, this will have to be done by the musculoskeletal radiologist  · Planning for aspiration on 07/29  · Continue prednisone  · Trend inflammatory markers  · Patient to follow-up with outpatient pain management    SIRS (systemic inflammatory response syndrome) ([facility] [address] )  Assessment & Plan  As evidenced by leukocytosis and fever, likely reactive to severe osteoarthritis  · No further fevers since starting prednisone  · No signs of infection thus far  · Would like to rule out infection of the left hip with a left hip joint aspiration as recommended by Orthopedic surgery  · Planning for left hip aspiration by the musculoskeletal radiologist on 07/29  · Continue prednisone  · Trend fever curve and CBC with diff    Right shoulder pain  Assessment & Plan  Presenting with right shoulder pain which has been present for the past week  Unable to drive his car  Missed dialysis session due to severe right shoulder pain and testicular pain  · No osseous abnormality noted on right shoulder x-ray  · Pain improving with prednisone  · Continue PT/OT  · Appreciate orthopedic input    Severe Left Orchalgia  Assessment & Plan  Presents with left testicular pain which has been present for several weeks    Saw an outpatient provider who prescribed cefepime after dialysis for presumed epididymitis  Patient states this pain feels similar to his prior history of epididymitis  · No signs of epididymitis on imaging  · Spoke with Infectious Disease, possible referred pain from severe osteoarthritis of the left hip  · Spoke with Urology, could possibly be related to osteoarthritis causing neuropathy with referred pain to the left testicle or inflamed left epididymal cyst due to trauma  · Recommend anti-inflammatories and tight-fitting underwear for few days  · No need for further antibiotics    Hypertension  Assessment & Plan  BP stable  Continue Coreg and nifedipine    Type 2 diabetes mellitus, without long-term current use of insulin [facility])  Assessment & Plan  Lab Results   Component Value Date    HGBA1C 6 6 (H) 07/07/2022       Recent Labs     07/27/22  1556 07/27/22  2118 07/28/22  0727 07/28/22  1116   POCGLU 119 169* 217* 195*       Blood Sugar Average: Last 72 hrs:  (P) 151 875   · Not taking his Linagliptin as he does not have any further tablets  · Now with steroid induced hyperglycemia  · Added mealtime coverage  · Monitor Accu-Cheks AC + HS with lispro insulin sliding scale coverage    ESRD (end stage renal disease) [facility])  Assessment & Plan  Lab Results   Component Value Date    EGFR 6 07/27/2022    EGFR 6 07/26/2022    EGFR 7 07/12/2022    CREATININE 7 80 (H) 07/27/2022    CREATININE 7 84 (H) 07/26/2022    CREATININE 6 82 (H) 07/12/2022     Patient follows with [facility] outpatient; [id number] via right anterior chest wall PermCath  · Nephrology following for dialysis management  · Underwent hemodialysis today, 7/27, as he missed his session yesterday  · Continue Renagel  · Avoid nephrotoxic agents and relative hypotension        VTE Pharmacologic Prophylaxis:   High Risk (Score >/= 5) - Pharmacological DVT Prophylaxis Ordered: heparin  Sequential Compression Devices Ordered      Patient Centered Rounds: I performed bedside rounds with nursing staff today   Discussions with Specialists or Other Care Team Provider: nursing, CM, Ortho AP    Education and Discussions with Family / Patient: Patient declined call to   Time Spent for Care: 30 minutes  More than 50% of total time spent on counseling and coordination of care as described above  Current Length of Stay: 1 day(s)  Current Patient Status: Inpatient   Certification Statement: The patient will continue to require additional inpatient hospital stay due to SIRS, r/o infection   Discharge Plan: Anticipate discharge in 48 hrs to home  Code Status: Level 1 - Full Code    Subjective:   Patient seen and examined at bedside  Reports improvement in right shoulder and left testicular pain  States he refused the left hip aspiration yesterday secondary to severe pain  Is now amenable to left aspiration as his pain is better controlled  He denies any chest pain or trouble breathing  He denies any fever or chills  He does report history of lower extremity is burning pain which is likely neuropathy  He is amenable to following up with Pain Management as an outpatient  Objective:     Vitals:   Temp (24hrs), Av 8 °F (37 1 °C), Min:98 2 °F (36 8 °C), Max:99 3 °F (37 4 °C)    Temp:  [98 2 °F (36 8 °C)-99 3 °F (37 4 °C)] 99 3 °F (37 4 °C)  HR:  [68-78] 76  Resp:  [16-20] 18  BP: (106-160)/(52-78) 146/71  SpO2:  [90 %-93 %] 93 %  Body mass index is 29 03 kg/m²  Input and Output Summary (last 24 hours): Intake/Output Summary (Last 24 hours) at 2022 1554  Last data filed at 2022 1312  Gross per 24 hour   Intake 210 ml   Output --   Net 210 ml       Physical Exam:   Physical Exam  Vitals and nursing note reviewed  Constitutional:       General: He is not in acute distress  Appearance: He is well-developed  He is ill-appearing  He is not toxic-appearing or diaphoretic  HENT:      Head: Normocephalic     Eyes:      Conjunctiva/sclera: Conjunctivae normal  Cardiovascular:      Rate and Rhythm: Normal rate and regular rhythm  Pulmonary:      Effort: Pulmonary effort is normal  No respiratory distress  Breath sounds: Normal breath sounds  No wheezing or rales  Abdominal:      General: Bowel sounds are normal  There is no distension  Palpations: Abdomen is soft  Tenderness: There is no abdominal tenderness  Musculoskeletal:         General: Normal range of motion  Cervical back: Normal range of motion  Right lower leg: No edema  Left lower leg: No edema  Skin:     General: Skin is warm and dry  Capillary Refill: Capillary refill takes less than 2 seconds  Coloration: Skin is pale  Comments: Right anterior chest wall PermCath   Neurological:      Mental Status: He is alert and oriented to person, place, and time  Mental status is at baseline  Psychiatric:         Mood and Affect: Mood normal          Behavior: Behavior normal          Thought Content: Thought content normal           Additional Data:     Labs:  Results from last 7 days   Lab Units 07/28/22  0533   WBC Thousand/uL 18 27*   HEMOGLOBIN g/dL 10 2*   HEMATOCRIT % 31 5*   PLATELETS Thousands/uL 332   NEUTROS PCT % 92*   LYMPHS PCT % 4*   MONOS PCT % 3*   EOS PCT % 0     Results from last 7 days   Lab Units 07/28/22  0533 07/27/22  0440 07/26/22  0918   SODIUM mmol/L 132*   < > 134*   POTASSIUM mmol/L 4 3   < > 4 4   CHLORIDE mmol/L 93*   < > 95*   CO2 mmol/L 26   < > 26   BUN mg/dL 29*   < > 52*   CREATININE mg/dL 5 17*   < > 7 84*   ANION GAP mmol/L 13   < > 13   CALCIUM mg/dL 8 8   < > 8 6   ALBUMIN g/dL  --   --  3 0*   TOTAL BILIRUBIN mg/dL  --   --  0 22   ALK PHOS U/L  --   --  105   ALT U/L  --   --  19   AST U/L  --   --  16   GLUCOSE RANDOM mg/dL 199*   < > 182*    < > = values in this interval not displayed           Results from last 7 days   Lab Units 07/28/22  1116 07/28/22  0727 07/27/22  2118 07/27/22  1556 07/27/22  1142 07/27/22  0303 07/26/22  2049 07/26/22  1551   POC GLUCOSE mg/dl 195* 217* 169* 119 122 118 139 136         Results from last 7 days   Lab Units 07/27/22  0440 07/26/22  0918   LACTIC ACID mmol/L  --  0 5   PROCALCITONIN ng/ml 0 71* 0 44*       Lines/Drains:  Invasive Devices  Report    Peripheral Intravenous Line  Duration           Peripheral IV 07/26/22 Left Antecubital 2 days          Hemodialysis Catheter  Duration           HD Permanent Double Catheter 182 days                      Imaging: Reviewed radiology reports from this admission including: chest xray, abdominal/pelvic CT and scrotal US    Recent Cultures (last 7 days):   Results from last 7 days   Lab Units 07/26/22  0948   BLOOD CULTURE  No Growth at 48 hrs  No Growth at 48 hrs         Last 24 Hours Medication List:   Current Facility-Administered Medications   Medication Dose Route Frequency Provider Last Rate    acetaminophen  650 mg Oral Q6H PRN GRANT Feliciano      allopurinol  100 mg Oral Daily Sergio Adam [place]      atorvastatin  80 mg Oral Daily Sergio Adam, [place]      carvedilol  12 5 mg Oral BID GRANT Feliciano      clopidogrel  75 mg Oral Daily Sergio Adam [place]      docusate sodium  100 mg Oral BID GRANT Elliott      doxercalciferol  1 5 mcg Intravenous After Dialysis Roxy Guerrero MD      epoetin jessica  800 Units Intravenous After Dialysis GRANT Feliciano      famotidine  10 mg Oral Daily GRANT Elliott      heparin (porcine)  5,000 Units Subcutaneous [facility] [address] [place] [address] Dr Ga [place]      insulin lispro  1-5 Units Subcutaneous HS GRANT Feliciano      insulin lispro  1-6 Units Subcutaneous TID [facility] GRANT Feliciano      insulin lispro  2 Units Subcutaneous TID With Meals Ledon Jones, CRNP      lidocaine  1 patch Topical Daily GRANT Feliciano      methocarbamol  500 mg Oral Q12H PRN GRANT Seymour      ondansetron  4 mg Intravenous Q6H PRN GRANT Hinton      oxyCODONE  2 5 mg Oral Q8H PRN GRANT Seymour      oxyCODONE-acetaminophen  1 tablet Oral Q6H PRN GRANT Seymour      polyethylene glycol  17 g Oral Daily PRN GRANT Seymour      [START ON 7/29/2022] predniSONE  40 mg Oral Daily Sangeetha Riley, [address]      sevelamer  800 mg Oral TID With Meals GRANT Hinton          Today, Patient Was Seen By: GRANT Seymour    **Please Note: This note may have been constructed using a voice recognition system  **

## 2022-07-28 NOTE — PROGRESS NOTES
Vancomycin IV Pharmacy-to-Dose Consultation    Maria Luz Anderson is a 60 y o  male who is currently receiving Vancomycin IV with management by the Pharmacy Consult service  Assessment/Plan:  The patient was reviewed  Renal function is stable and no signs or symptoms of nephrotoxicity and/or infusion reactions were documented in the chart  Based on todays assessment, continue current vancomycin (day # 3 ) dosing of 1000mg IV PHD , with a plan for trough to be drawn at pre HD random level on 8/2/22   We will continue to follow the patients culture results and clinical progress daily      Kenya Ferraro, Pharmacist

## 2022-07-28 NOTE — PROGRESS NOTES
[address] PROGRESS NOTE   Laine Mcclain 60 y o  male MRN: [id number]  Unit/Bed#: [address] [place] [address] Encounter: [id number]  Reason for Consult: ESRD on HD     ASSESSMENT and PLAN:  Laine Mcclain is a 60 y o  male who was admitted to [facility] after presenting with left testicular pain and left shoulder pain  A renal consultation is requested today for assistance in the management of ESRD      End-stage renal disease on hemodialysis  - Schedule Tuesday/Thursday/Saturday  - HD per schedule today     Left testicular pain, improved with antibiotics and prednisone   - Unclear etiology, questionable epididymitis versus referred pain from hip   - Ultrasound of scrotum with no acute abnormality  - CT abdomen with no acute inflammatory infectious processes, prominent left inguinal lymph nodes as identified before  - Recently started on IV cefepime 1 g 3 times a week with dialysis  - Start date 07/23/2022, end date 08/06/2022 for outpatient dialysis unit    Right shoulder pain   - Patent believes it to be a gout flare   - Improved with prednisone      Leukocytosis  - Need to rule out bacteremia in setting of presence of dialysis catheter   - IR consulted for aspiration of left hip to rule out infection   - Blood cultures negative to date   - IV Cefepime continued per schedule from out-patient dialysis unit      Hypertension/volume  - BP borderline   - Hold Nifedipine   - Continue Coreg   -  kg, currently below EDW   - Gentle UF on HD as tolerated      Anemia  - Goal hemoglobin 10-11  - Continue Epogen 800 units with each session of dialysis  Lab Results   Component Value Date    HGB 10 2 (L) 07/28/2022     Electrolytes/acid base  - 3 K bath on HD today   Lab Results   Component Value Date    K 4 3 07/28/2022    CL 93 (L) 07/28/2022    CO2 26 07/28/2022     Bone mineral disease  - Goal serum phosphate less than 5 5  - Continue Renvela 3 tablets t i d   With meals  - We will give IV Hectorol 1 5 mcg with dialysis  Lab Results   Component Value Date    CALCIUM 8 8 07/28/2022    PHOS 4 8 (H) 07/28/2022     DISPOSITION:  - HD today   - Follow blood cultures   - Avoid NSAIDs due to their cardiovascular and gastrointestinal side effects in ESRD patients     SUBJECTIVE / 24H INTERVAL HISTORY:  Shoulder pain has much improved  Testicular pain has also improved  Denies dyspnea or chest pain       OBJECTIVE:  Current Weight: Weight - Scale: 99 8 kg (220 lb)  Vitals:    07/27/22 2121 07/27/22 2300 07/28/22 0545 07/28/22 0700   BP: 106/52 109/54  108/59   BP Location: Left arm Left arm     Pulse:  68  69   Resp:  18     Temp:  99 1 °F (37 3 °C)  98 4 °F (36 9 °C)   TempSrc:  Oral  Oral   SpO2:  90%  93%   Weight:   99 8 kg (220 lb)        Intake/Output Summary (Last 24 hours) at 7/28/2022 7629  Last data filed at 7/27/2022 1236  Gross per 24 hour   Intake 300 ml   Output 1503 ml   Net -1203 ml     General: Awake and alert   Eyes: Conjunctivae pink, sclera anicteric  ENT: Lips and mucous membranes moist  Neck: Supple   Chest: Clear to auscultation bilaterally   CVS: S1 & S2 present, normal rate, regular rhythm, no murmur  Abdomen: Soft, non-tender, non-distended, Bowel sounds normoactive  Extremities: No edema bilaterally   Skin: No rash  Neuro: Awake, alert and oriented  Psych: Mood and affect appropriate   R chest wall PermCath   R shoulder no erythema, ROM no restriction     Medications:    Current Facility-Administered Medications:     acetaminophen (TYLENOL) tablet 650 mg, 650 mg, Oral, Q6H PRN, Gwynneth Genta, CRNP, 650 mg at 07/27/22 1315    allopurinol (ZYLOPRIM) tablet 100 mg, 100 mg, Oral, Daily, Gwynneth Genta, CRNP, 100 mg at 07/28/22 0859    atorvastatin (LIPITOR) tablet 80 mg, 80 mg, Oral, Daily, Gwynneth Genta, CRNP, 80 mg at 07/28/22 0859    carvedilol (COREG) tablet 12 5 mg, 12 5 mg, Oral, BID, Gwynneth Genta, CRNP, 12 5 mg at 07/27/22 1754    cefepime (MAXIPIME) IVPB (premix in dextrose) 1,000 mg 50 mL, 1,000 mg, Intravenous, After Dialysis, Nestor Trinh MD, Last Rate: 100 mL/hr at 07/27/22 1317, 1,000 mg at 07/27/22 1317    clopidogrel (PLAVIX) tablet 75 mg, 75 mg, Oral, Daily, GRANT Rodriguez, 75 mg at 07/28/22 0857    doxercalciferol (HECTOROL) injection 1 5 mcg, 1 5 mcg, Intravenous, After Dialysis, Nestor Trinh MD, 1 5 mcg at 07/27/22 1103    epoetin jessica (EPOGEN,PROCRIT) injection 800 Units, 800 Units, Intravenous, After Dialysis, GRANT Rodriguez, 800 Units at 07/27/22 1102    famotidine (PEPCID) tablet 10 mg, 10 mg, Oral, Daily, GRANT Corcoran, 10 mg at 07/28/22 0859    heparin (porcine) subcutaneous injection 5,000 Units, 5,000 Units, Subcutaneous, Q8H [facility], GRANT Rodriguez, 5,000 Units at 07/28/22 0900    insulin lispro (HumaLOG) 100 units/mL subcutaneous injection 1-5 Units, 1-5 Units, Subcutaneous, HS, GRANT Rodriguez, 1 Units at 07/27/22 2123    insulin lispro (HumaLOG) 100 units/mL subcutaneous injection 1-6 Units, 1-6 Units, Subcutaneous, TID AC, 2 Units at 07/28/22 0902 **AND** Fingerstick Glucose (POCT), , , TID AC, GRANT Rodriguez    insulin lispro (HumaLOG) 100 units/mL subcutaneous injection 2 Units, 2 Units, Subcutaneous, TID With Meals, GRANT Corcoran, 2 Units at 07/28/22 0902    lidocaine (LIDODERM) 5 % patch 1 patch, 1 patch, Topical, Daily, GRANT Rodriguez    methocarbamol (ROBAXIN) tablet 500 mg, 500 mg, Oral, Q12H PRN, GRANT Corcoran    NIFEdipine (PROCARDIA XL) 24 hr tablet 30 mg, 30 mg, Oral, BID, GRANT Rodriguez, 30 mg at 07/27/22 1315    ondansetron [facility]) injection 4 mg, 4 mg, Intravenous, Q6H PRN, GRANT Rodriguez    oxyCODONE (ROXICODONE) IR tablet 2 5 mg, 2 5 mg, Oral, Q8H PRN, GRANT Corcoran    oxyCODONE-acetaminophen (PERCOCET) 5-325 mg per tablet 1 tablet, 1 tablet, Oral, Q6H PRN, Isaiah Gilmore, RAMEZNP, 1 tablet at 07/28/22 0252    predniSONE tablet 50 mg, 50 mg, Oral, Daily, Isaiahelvin Gilmore CRNP, 50 mg at 07/28/22 5133    sevelamer (RENAGEL) tablet 800 mg, 800 mg, Oral, TID With Meals, Mancil Kay, CRNP, 800 mg at 07/28/22 4481    vancomycin (VANCOCIN) IVPB (premix in dextrose) 1,000 mg 200 mL, 10 mg/kg (Adjusted), Intravenous, Once PRN, Mancil Kay, CRNP    Laboratory Results:  Results from last 7 days   Lab Units 07/28/22  0533 07/27/22  0440 07/26/22  0918   WBC Thousand/uL 18 27* 14 92* 14 89*   HEMOGLOBIN g/dL 10 2* 9 0* 9 8*   HEMATOCRIT % 31 5* 27 6* 30 4*   PLATELETS Thousands/uL 332 287 321   POTASSIUM mmol/L 4 3 4 6 4 4   CHLORIDE mmol/L 93* 98 95*   CO2 mmol/L 26 24 26   BUN mg/dL 29* 52* 52*   CREATININE mg/dL 5 17* 7 80* 7 84*   CALCIUM mg/dL 8 8 8 4 8 6   MAGNESIUM mg/dL  --   --  2 0   PHOSPHORUS mg/dL 4 8* 5 1* 5 1*

## 2022-07-28 NOTE — OCCUPATIONAL THERAPY NOTE
OT EVALUATION       07/28/22 1020   Note Type   Note type Screen   Additional Comments Patient is independent with ADLS per PT  No skilled OT needs at this time     Licensure   [place] License Number  [facility] O[facility]L [id number]

## 2022-07-28 NOTE — PLAN OF CARE
Problem: METABOLIC, FLUID AND ELECTROLYTES - ADULT  Goal: Fluid balance maintained  Description: INTERVENTIONS:  - Monitor labs   - Monitor I/O and WT  - Instruct patient on fluid and nutrition as appropriate  - Assess for signs & symptoms of volume excess or deficit  Outcome: Progressing     Problem: PAIN - ADULT  Goal: Verbalizes/displays adequate comfort level or baseline comfort level  Description: Interventions:  - Encourage patient to monitor pain and request assistance  - Assess pain using appropriate pain scale  - Administer analgesics based on type and severity of pain and evaluate response  - Implement non-pharmacological measures as appropriate and evaluate response  - Consider cultural and social influences on pain and pain management  - Notify physician/advanced practitioner if interventions unsuccessful or patient reports new pain  Outcome: Progressing

## 2022-07-29 ENCOUNTER — APPOINTMENT (INPATIENT)
Dept: RADIOLOGY | Facility: HOSPITAL | Age: 62
DRG: 553 | End: 2022-07-29
Payer: MEDICARE

## 2022-07-29 LAB
ALBUMIN SERPL BCP-MCNC: 2.7 G/DL (ref 3.5–5)
ALP SERPL-CCNC: 154 U/L (ref 46–116)
ALT SERPL W P-5'-P-CCNC: 41 U/L (ref 12–78)
ANION GAP SERPL CALCULATED.3IONS-SCNC: 11 MMOL/L (ref 4–13)
AST SERPL W P-5'-P-CCNC: 41 U/L (ref 5–45)
BASOPHILS # BLD AUTO: 0.02 THOUSANDS/ΜL (ref 0–0.1)
BASOPHILS NFR BLD AUTO: 0 % (ref 0–1)
BILIRUB SERPL-MCNC: 0.3 MG/DL (ref 0.2–1)
BUN SERPL-MCNC: 31 MG/DL (ref 5–25)
CALCIUM ALBUM COR SERPL-MCNC: 9.9 MG/DL (ref 8.3–10.1)
CALCIUM SERPL-MCNC: 8.9 MG/DL (ref 8.3–10.1)
CHLORIDE SERPL-SCNC: 91 MMOL/L (ref 96–108)
CO2 SERPL-SCNC: 29 MMOL/L (ref 21–32)
CREAT SERPL-MCNC: 4.17 MG/DL (ref 0.6–1.3)
CRP SERPL QL: 137 MG/L
EOSINOPHIL # BLD AUTO: 0 THOUSAND/ΜL (ref 0–0.61)
EOSINOPHIL NFR BLD AUTO: 0 % (ref 0–6)
ERYTHROCYTE [DISTWIDTH] IN BLOOD BY AUTOMATED COUNT: 14.2 % (ref 11.6–15.1)
ERYTHROCYTE [SEDIMENTATION RATE] IN BLOOD: 74 MM/HOUR (ref 0–19)
GFR SERPL CREATININE-BSD FRML MDRD: 14 ML/MIN/1.73SQ M
GLUCOSE SERPL-MCNC: 151 MG/DL (ref 65–140)
GLUCOSE SERPL-MCNC: 199 MG/DL (ref 65–140)
GLUCOSE SERPL-MCNC: 214 MG/DL (ref 65–140)
GLUCOSE SERPL-MCNC: 258 MG/DL (ref 65–140)
GLUCOSE SERPL-MCNC: 288 MG/DL (ref 65–140)
HCT VFR BLD AUTO: 33 % (ref 36.5–49.3)
HGB BLD-MCNC: 10.9 G/DL (ref 12–17)
IMM GRANULOCYTES # BLD AUTO: 0.25 THOUSAND/UL (ref 0–0.2)
IMM GRANULOCYTES NFR BLD AUTO: 1 % (ref 0–2)
LYMPHOCYTES # BLD AUTO: 0.96 THOUSANDS/ΜL (ref 0.6–4.47)
LYMPHOCYTES NFR BLD AUTO: 5 % (ref 14–44)
MAGNESIUM SERPL-MCNC: 2.1 MG/DL (ref 1.6–2.6)
MCH RBC QN AUTO: 29.3 PG (ref 26.8–34.3)
MCHC RBC AUTO-ENTMCNC: 33 G/DL (ref 31.4–37.4)
MCV RBC AUTO: 89 FL (ref 82–98)
MONOCYTES # BLD AUTO: 1.19 THOUSAND/ΜL (ref 0.17–1.22)
MONOCYTES NFR BLD AUTO: 6 % (ref 4–12)
MRSA NOSE QL CULT: NORMAL
NEUTROPHILS # BLD AUTO: 16.51 THOUSANDS/ΜL (ref 1.85–7.62)
NEUTS SEG NFR BLD AUTO: 88 % (ref 43–75)
NRBC BLD AUTO-RTO: 0 /100 WBCS
PHOSPHATE SERPL-MCNC: 3.6 MG/DL (ref 2.3–4.1)
PLATELET # BLD AUTO: 350 THOUSANDS/UL (ref 149–390)
PMV BLD AUTO: 9.3 FL (ref 8.9–12.7)
POTASSIUM SERPL-SCNC: 3.9 MMOL/L (ref 3.5–5.3)
PROT SERPL-MCNC: 8 G/DL (ref 6.4–8.4)
RBC # BLD AUTO: 3.72 MILLION/UL (ref 3.88–5.62)
SODIUM SERPL-SCNC: 131 MMOL/L (ref 135–147)
WBC # BLD AUTO: 18.93 THOUSAND/UL (ref 4.31–10.16)

## 2022-07-29 PROCEDURE — 99232 SBSQ HOSP IP/OBS MODERATE 35: CPT | Performed by: INTERNAL MEDICINE

## 2022-07-29 PROCEDURE — 86140 C-REACTIVE PROTEIN: CPT | Performed by: NURSE PRACTITIONER

## 2022-07-29 PROCEDURE — 84100 ASSAY OF PHOSPHORUS: CPT | Performed by: INTERNAL MEDICINE

## 2022-07-29 PROCEDURE — 99232 SBSQ HOSP IP/OBS MODERATE 35: CPT | Performed by: FAMILY MEDICINE

## 2022-07-29 PROCEDURE — 80053 COMPREHEN METABOLIC PANEL: CPT | Performed by: NURSE PRACTITIONER

## 2022-07-29 PROCEDURE — 10007 FNA BX W/FLUOR GDN 1ST LES: CPT

## 2022-07-29 PROCEDURE — 83735 ASSAY OF MAGNESIUM: CPT | Performed by: NURSE PRACTITIONER

## 2022-07-29 PROCEDURE — 85025 COMPLETE CBC W/AUTO DIFF WBC: CPT | Performed by: NURSE PRACTITIONER

## 2022-07-29 PROCEDURE — 82948 REAGENT STRIP/BLOOD GLUCOSE: CPT

## 2022-07-29 PROCEDURE — 85652 RBC SED RATE AUTOMATED: CPT | Performed by: NURSE PRACTITIONER

## 2022-07-29 RX ORDER — LIDOCAINE HYDROCHLORIDE 10 MG/ML
5 INJECTION, SOLUTION EPIDURAL; INFILTRATION; INTRACAUDAL; PERINEURAL
Status: COMPLETED | OUTPATIENT
Start: 2022-07-29 | End: 2022-07-29

## 2022-07-29 RX ADMIN — SEVELAMER HYDROCHLORIDE 800 MG: 800 TABLET, FILM COATED PARENTERAL at 12:27

## 2022-07-29 RX ADMIN — DOCUSATE SODIUM 100 MG: 100 CAPSULE, LIQUID FILLED ORAL at 08:27

## 2022-07-29 RX ADMIN — INSULIN LISPRO 2 UNITS: 100 INJECTION, SOLUTION INTRAVENOUS; SUBCUTANEOUS at 08:24

## 2022-07-29 RX ADMIN — IOHEXOL 1 ML: 240 INJECTION, SOLUTION INTRATHECAL; INTRAVASCULAR; INTRAVENOUS; ORAL at 09:43

## 2022-07-29 RX ADMIN — INSULIN LISPRO 2 UNITS: 100 INJECTION, SOLUTION INTRAVENOUS; SUBCUTANEOUS at 08:26

## 2022-07-29 RX ADMIN — SEVELAMER HYDROCHLORIDE 800 MG: 800 TABLET, FILM COATED PARENTERAL at 16:56

## 2022-07-29 RX ADMIN — CARVEDILOL 12.5 MG: 12.5 TABLET, FILM COATED ORAL at 08:28

## 2022-07-29 RX ADMIN — HEPARIN SODIUM 5000 UNITS: 5000 INJECTION INTRAVENOUS; SUBCUTANEOUS at 16:55

## 2022-07-29 RX ADMIN — SEVELAMER HYDROCHLORIDE 800 MG: 800 TABLET, FILM COATED PARENTERAL at 08:27

## 2022-07-29 RX ADMIN — ACETAMINOPHEN 650 MG: 325 TABLET, FILM COATED ORAL at 12:32

## 2022-07-29 RX ADMIN — INSULIN LISPRO 1 UNITS: 100 INJECTION, SOLUTION INTRAVENOUS; SUBCUTANEOUS at 12:27

## 2022-07-29 RX ADMIN — PREDNISONE 40 MG: 20 TABLET ORAL at 08:27

## 2022-07-29 RX ADMIN — INSULIN LISPRO 2 UNITS: 100 INJECTION, SOLUTION INTRAVENOUS; SUBCUTANEOUS at 16:55

## 2022-07-29 RX ADMIN — INSULIN LISPRO 3 UNITS: 100 INJECTION, SOLUTION INTRAVENOUS; SUBCUTANEOUS at 16:55

## 2022-07-29 RX ADMIN — INSULIN LISPRO 2 UNITS: 100 INJECTION, SOLUTION INTRAVENOUS; SUBCUTANEOUS at 12:27

## 2022-07-29 RX ADMIN — LIDOCAINE HYDROCHLORIDE 5 ML: 10 INJECTION, SOLUTION EPIDURAL; INFILTRATION; INTRACAUDAL; PERINEURAL at 09:43

## 2022-07-29 RX ADMIN — ATORVASTATIN CALCIUM 80 MG: 80 TABLET ORAL at 08:27

## 2022-07-29 RX ADMIN — INSULIN LISPRO 3 UNITS: 100 INJECTION, SOLUTION INTRAVENOUS; SUBCUTANEOUS at 21:25

## 2022-07-29 RX ADMIN — FAMOTIDINE 10 MG: 20 TABLET ORAL at 08:27

## 2022-07-29 RX ADMIN — ALLOPURINOL 100 MG: 100 TABLET ORAL at 08:27

## 2022-07-29 RX ADMIN — CARVEDILOL 12.5 MG: 12.5 TABLET, FILM COATED ORAL at 17:00

## 2022-07-29 NOTE — PROGRESS NOTES
[facility]   Dirk Shaikh 60 y o  male MRN: [id number]  Unit/Bed#: [address] [place] [address] Encounter: [id number]  Reason for Consult: ESRD on HD     ASSESSMENT and PLAN:  Dirk Shaikh is a 60 y o  male who was admitted to [facility] after presenting with left testicular pain and left shoulder pain  A renal consultation was requested for assistance in the management of ESRD      End-stage renal disease on hemodialysis  - Schedule Tuesday/Thursday/Saturday  - Electrolytes and volume status stable today  - Plan next hemodialysis session tomorrow    Left testicular pain, improved with antibiotics and prednisone   - Unclear etiology, questionable epididymitis versus referred pain from hip   - Ultrasound of scrotum with no acute abnormality  - CT abdomen with no acute inflammatory infectious processes  - Recently started on IV cefepime 1 g 3 times a week with dialysis  - Start date 07/23/2022, end date 08/06/2022 for outpatient dialysis unit    Right shoulder pain   - Patent believes it to be a gout flare   - Improved with prednisone      Leukocytosis  - Need to rule out bacteremia in setting of presence of dialysis catheter   - Currently rising probably in setting of steroid use  - IR consulted for aspiration of left hip to rule out infection   - Blood cultures negative to date   - Currently off antibiotics per primary team      Hypertension/volume  - Continue Coreg   -  kg, currently below EDW      Anemia  - Goal hemoglobin 10-11  - Continue Epogen 800 units with each session of dialysis  Lab Results   Component Value Date    HGB 10 9 (L) 07/29/2022     Electrolytes/acid base  - Hyponatremia, limit fluid intake to 1500 ml per day   Lab Results   Component Value Date    K 3 9 07/29/2022    CL 91 (L) 07/29/2022    CO2 29 07/29/2022     Bone mineral disease  - Goal serum phosphate less than 5 5  - Continue Renvela 3 tablets t i d   With meals  - We will give IV Hectorol 1 5 mcg with dialysis  Lab Results   Component Value Date    CALCIUM 8 9 07/29/2022    PHOS 3 6 07/29/2022     DISPOSITION:  - IR hip aspiration today   - Plan next session of hemodialysis tomorrow if still inpatient otherwise he can go to his outpatient dialysis unit     SUBJECTIVE / 24H INTERVAL HISTORY:  Going to IR for hip aspiration  Testicular pain is still present but slightly improved  Right shoulder pain has also improved  Denies chest pain or dyspnea  Denies abdominal pain  Denies nausea or vomiting      OBJECTIVE:  Current Weight: Weight - Scale: 100 kg (220 lb 7 4 oz)  Vitals:    07/28/22 1940 07/28/22 2350 07/29/22 0600 07/29/22 0739   BP: 127/60 150/71  160/74   BP Location: Right arm Right arm     Pulse: 89 74  69   Resp: 17 18  18   Temp: 98 1 °F (36 7 °C) 97 8 °F (36 6 °C)  98 1 °F (36 7 °C)   TempSrc: Oral Oral  Oral   SpO2: 98% 94%  94%   Weight:   100 kg (220 lb 7 4 oz)        Intake/Output Summary (Last 24 hours) at 7/29/2022 0746  Last data filed at 7/28/2022 2032  Gross per 24 hour   Intake 510 ml   Output 700 ml   Net -190 ml     General: Awake and alert   Eyes: Conjunctivae pink, sclera anicteric  ENT: Lips and mucous membranes moist  Neck: Supple   Chest: Clear to auscultation bilaterally   CVS: S1 & S2 present, normal rate, regular rhythm, no murmur  Abdomen: Soft, non-tender, non-distended, Bowel sounds normoactive  Extremities: No edema bilaterally   Skin: No rash  Neuro: Awake, alert and oriented  Psych: Mood and affect appropriate   Right chest wall PermCath present    Medications:    Current Facility-Administered Medications:     acetaminophen (TYLENOL) tablet 650 mg, 650 mg, Oral, Q6H PRN, Lynn Pear, CRNP, 650 mg at 07/27/22 1315    allopurinol (ZYLOPRIM) tablet 100 mg, 100 mg, Oral, Daily, Lynn Pear, CRNP, 100 mg at 07/28/22 0859    atorvastatin (LIPITOR) tablet 80 mg, 80 mg, Oral, Daily, Lynn Pear, CRNP, 80 mg at 07/28/22 0859    carvedilol (COREG) tablet 12 5 mg, 12 5 mg, Oral, BID, GRANT Rodriguez, 12 5 mg at 07/28/22 1707    clopidogrel (PLAVIX) tablet 75 mg, 75 mg, Oral, Daily, GRANT Rodriguez, 75 mg at 07/28/22 0857    docusate sodium (COLACE) capsule 100 mg, 100 mg, Oral, BID, GRANT Corcoran, 100 mg at 07/28/22 1140    doxercalciferol (HECTOROL) injection 1 5 mcg, 1 5 mcg, Intravenous, After Dialysis, Nestor Trinh MD, 1 5 mcg at 07/27/22 1103    epoetin jessica (EPOGEN,PROCRIT) injection 800 Units, 800 Units, Intravenous, After Dialysis, GRANT Rodriguez, 800 Units at 07/27/22 1102    famotidine (PEPCID) tablet 10 mg, 10 mg, Oral, Daily, GRANT Corcoran, 10 mg at 07/28/22 0859    heparin (porcine) subcutaneous injection 5,000 Units, 5,000 Units, Subcutaneous, Q8H [address], GRANT Rodriguez, 5,000 Units at 07/28/22 2352    insulin lispro (HumaLOG) 100 units/mL subcutaneous injection 1-5 Units, 1-5 Units, Subcutaneous, HS, GRANT Rodriguez, 3 Units at 07/28/22 2120    insulin lispro (HumaLOG) 100 units/mL subcutaneous injection 1-6 Units, 1-6 Units, Subcutaneous, TID AC, 1 Units at 07/28/22 1652 **AND** Fingerstick Glucose (POCT), , , TID AC, GRANT Rodriguez    insulin lispro (HumaLOG) 100 units/mL subcutaneous injection 2 Units, 2 Units, Subcutaneous, TID With Meals, GRANT Corcoran, 2 Units at 07/28/22 1651    lidocaine (LIDODERM) 5 % patch 1 patch, 1 patch, Topical, Daily, GRANT Rodriguez    methocarbamol (ROBAXIN) tablet 500 mg, 500 mg, Oral, Q12H PRN, GRANT Corcoran    ondansetron [facility]) injection 4 mg, 4 mg, Intravenous, Q6H PRN, GRANT Rodriguez    oxyCODONE (ROXICODONE) IR tablet 2 5 mg, 2 5 mg, Oral, Q8H PRN, GRANT Corcoran    oxyCODONE-acetaminophen (PERCOCET) 5-325 mg per tablet 1 tablet, 1 tablet, Oral, Q6H PRN, GRANT Corcoran, 1 tablet at 07/28/22 2350    polyethylene glycol (MIRALAX) packet 17 g, 17 g, Oral, Daily PRN, Indigo Nickel, CRNP    predniSONE tablet 40 mg, 40 mg, Oral, Daily, Indigo Nickel, CRNP    sevelamer (RENAGEL) tablet 800 mg, 800 mg, Oral, TID With Meals, Bandar Johnson, CRNP, 800 mg at 07/28/22 9440    Laboratory Results:  Results from last 7 days   Lab Units 07/29/22  0600 07/28/22  0533 07/27/22  0440 07/26/22  0918   WBC Thousand/uL 18 93* 18 27* 14 92* 14 89*   HEMOGLOBIN g/dL 10 9* 10 2* 9 0* 9 8*   HEMATOCRIT % 33 0* 31 5* 27 6* 30 4*   PLATELETS Thousands/uL 350 332 287 321   POTASSIUM mmol/L 3 9 4 3 4 6 4 4   CHLORIDE mmol/L 91* 93* 98 95*   CO2 mmol/L 29 26 24 26   BUN mg/dL 31* 29* 52* 52*   CREATININE mg/dL 4 17* 5 17* 7 80* 7 84*   CALCIUM mg/dL 8 9 8 8 8 4 8 6   MAGNESIUM mg/dL 2 1  --   --  2 0   PHOSPHORUS mg/dL 3 6 4 8* 5 1* 5 1*

## 2022-07-29 NOTE — PROGRESS NOTES
Vancomycin IV Pharmacy-to-Dose Consultation    Freddy Laureano is a 60 y o  male who is currently receiving Vancomycin IV with management by the Pharmacy Consult service  Assessment/Plan:  The patient was reviewed  Renal function is stable and no signs or symptoms of nephrotoxicity and/or infusion reactions were documented in the chart  Based on todays assessment, continue current vancomycin (day # 4) dosing of 1000mg iv PHD (T-TH-SAT), with a plan for trough to be drawn random in am on 8/2/22  We will continue to follow the patients culture results and clinical progress daily      Christopher Pereyra, Pharmacist

## 2022-07-29 NOTE — PLAN OF CARE
Problem: Potential for Falls  Goal: Patient will remain free of falls  Description: INTERVENTIONS:  - Educate patient/family on patient safety including physical limitations  - Instruct patient to call for assistance with activity   - Consult OT/PT to assist with strengthening/mobility   - Keep Call bell within reach  - Keep bed low and locked with side rails adjusted as appropriate  - Keep care items and personal belongings within reach  - Initiate and maintain comfort rounds  - Make Fall Risk Sign visible to staff  - Offer Toileting every  Hours, in advance of need  - Initiate/Maintain alarm  - Obtain necessary fall risk management equipment:   - Apply yellow socks and bracelet for high fall risk patients  - Consider moving patient to room near nurses station  Outcome: Progressing     Problem: PAIN - ADULT  Goal: Verbalizes/displays adequate comfort level or baseline comfort level  Description: Interventions:  - Encourage patient to monitor pain and request assistance  - Assess pain using appropriate pain scale  - Administer analgesics based on type and severity of pain and evaluate response  - Implement non-pharmacological measures as appropriate and evaluate response  - Consider cultural and social influences on pain and pain management  - Notify physician/advanced practitioner if interventions unsuccessful or patient reports new pain  Outcome: Progressing     Problem: INFECTION - ADULT  Goal: Absence or prevention of progression during hospitalization  Description: INTERVENTIONS:  - Assess and monitor for signs and symptoms of infection  - Monitor lab/diagnostic results  - Monitor all insertion sites, i e  indwelling lines, tubes, and drains  - Monitor endotracheal if appropriate and nasal secretions for changes in amount and color  - Currie appropriate cooling/warming therapies per order  - Administer medications as ordered  - Instruct and encourage patient and family to use good hand hygiene technique  - Identify and instruct in appropriate isolation precautions for identified infection/condition  Outcome: Progressing  Goal: Absence of fever/infection during neutropenic period  Description: INTERVENTIONS:  - Monitor WBC    Outcome: Progressing     Problem: SAFETY ADULT  Goal: Patient will remain free of falls  Description: INTERVENTIONS:  - Educate patient/family on patient safety including physical limitations  - Instruct patient to call for assistance with activity   - Consult OT/PT to assist with strengthening/mobility   - Keep Call bell within reach  - Keep bed low and locked with side rails adjusted as appropriate  - Keep care items and personal belongings within reach  - Initiate and maintain comfort rounds  - Make Fall Risk Sign visible to staff  - Offer Toileting every  Hours, in advance of need  - Initiate/Maintain alarm  - Obtain necessary fall risk management equipment:   - Apply yellow socks and bracelet for high fall risk patients  - Consider moving patient to room near nurses station  Outcome: Progressing  Goal: Maintain or return to baseline ADL function  Description: INTERVENTIONS:  -  Assess patient's ability to carry out ADLs; assess patient's baseline for ADL function and identify physical deficits which impact ability to perform ADLs (bathing, care of mouth/teeth, toileting, grooming, dressing, etc )  - Assess/evaluate cause of self-care deficits   - Assess range of motion  - Assess patient's mobility; develop plan if impaired  - Assess patient's need for assistive devices and provide as appropriate  - Encourage maximum independence but intervene and supervise when necessary  - Involve family in performance of ADLs  - Assess for home care needs following discharge   - Consider OT consult to assist with ADL evaluation and planning for discharge  - Provide patient education as appropriate  Outcome: Progressing  Goal: Maintains/Returns to pre admission functional level  Description: INTERVENTIONS:  - Perform BMAT or MOVE assessment daily    - Set and communicate daily mobility goal to care team and patient/family/caregiver  - Collaborate with rehabilitation services on mobility goals if consulted  - Perform Range of Motion  times a day  - Reposition patient every  hours  - Dangle patient  times a day  - Stand patient  times a day  - Ambulate patient  times a day  - Out of bed to chair  times a day   - Out of bed for meals times a day  - Out of bed for toileting  - Record patient progress and toleration of activity level   Outcome: Progressing     Problem: DISCHARGE PLANNING  Goal: Discharge to home or other facility with appropriate resources  Description: INTERVENTIONS:  - Identify barriers to discharge w/patient and caregiver  - Arrange for needed discharge resources and transportation as appropriate  - Identify discharge learning needs (meds, wound care, etc )  - Arrange for interpretive services to assist at discharge as needed  - Refer to Case Management Department for coordinating discharge planning if the patient needs post-hospital services based on physician/advanced practitioner order or complex needs related to functional status, cognitive ability, or social support system  Outcome: Progressing     Problem: Knowledge Deficit  Goal: Patient/family/caregiver demonstrates understanding of disease process, treatment plan, medications, and discharge instructions  Description: Complete learning assessment and assess knowledge base    Interventions:  - Provide teaching at level of understanding  - Provide teaching via preferred learning methods  Outcome: Progressing     Problem: METABOLIC, FLUID AND ELECTROLYTES - ADULT  Goal: Fluid balance maintained  Description: INTERVENTIONS:  - Monitor labs   - Monitor I/O and WT  - Instruct patient on fluid and nutrition as appropriate  - Assess for signs & symptoms of volume excess or deficit  Outcome: Progressing  Goal: Glucose maintained within target range  Description: INTERVENTIONS:  - Monitor Blood Glucose as ordered  - Assess for signs and symptoms of hyperglycemia and hypoglycemia  - Administer ordered medications to maintain glucose within target range  - Assess nutritional intake and initiate nutrition service referral as needed  Outcome: Progressing  Goal: Electrolytes maintained within normal limits  Description: INTERVENTIONS:  - Monitor labs and assess patient for signs and symptoms of electrolyte imbalances  - Administer electrolyte replacement as ordered  - Monitor response to electrolyte replacements, including repeat lab results as appropriate  - Instruct patient on fluid and nutrition as appropriate  Outcome: Progressing     Problem: MUSCULOSKELETAL - ADULT  Goal: Maintain or return mobility to safest level of function  Description: INTERVENTIONS:  - Assess patient's ability to carry out ADLs; assess patient's baseline for ADL function and identify physical deficits which impact ability to perform ADLs (bathing, care of mouth/teeth, toileting, grooming, dressing, etc )  - Assess/evaluate cause of self-care deficits   - Assess range of motion  - Assess patient's mobility  - Assess patient's need for assistive devices and provide as appropriate  - Encourage maximum independence but intervene and supervise when necessary  - Involve family in performance of ADLs  - Assess for home care needs following discharge   - Consider OT consult to assist with ADL evaluation and planning for discharge  - Provide patient education as appropriate  Outcome: Progressing  Goal: Maintain proper alignment of affected body part  Description: INTERVENTIONS:  - Support, maintain and protect limb and body alignment  - Provide patient/ family with appropriate education  Outcome: Progressing

## 2022-07-29 NOTE — PROGRESS NOTES
[facility]  Progress Note - Clay Robertson 1960, 60 y o  male MRN: [id number]  Unit/Bed#: [address] [place] [address] Encounter: [id number]  Primary Care Provider: Jia Borja MD   Date and time admitted to hospital: 7/26/2022  8:11 AM    * Osteoarthritis of left hip  Assessment & Plan  Left hip osteoarthritis as noted on imaging  Left hip pain could be the cause of his left testicular pain  · Appreciate orthopedic input  · CRP and ESR elevated  · Initially patient refused left hip aspiration secondary to severe pain, now amenable to aspiration  · Hip aspiration done today yielded 0 mL fluid  No culture was collected  · Continue prednisone  · Trend inflammatory markers  · Patient to follow-up with outpatient pain management    ESRD (end stage renal disease) [facility])  Assessment & Plan  Lab Results   Component Value Date    EGFR 6 07/27/2022    EGFR 6 07/26/2022    EGFR 7 07/12/2022    CREATININE 7 80 (H) 07/27/2022    CREATININE 7 84 (H) 07/26/2022    CREATININE 6 82 (H) 07/12/2022     Patient follows with [facility] outpatient;  [id number]-[id number]-[id number] via right anterior chest wall PermCath  · Nephrology following for dialysis management  · Underwent hemodialysis 7/27 and 7/28 with plan for next session on Saturday   · Continue Renagel  · Avoid nephrotoxic agents and relative hypotension    Type 2 diabetes mellitus, without long-term current use of insulin [facility])  Assessment & Plan  Lab Results   Component Value Date    HGBA1C 6 6 (H) 07/07/2022       Recent Labs     07/27/22  1556 07/27/22  2118 07/28/22  0727 07/28/22  1116   POCGLU 119 169* 217* 195*       Blood Sugar Average: Last 72 hrs:  (P) 151 875   · Not taking his Linagliptin as he does not have any further tablets  · Now with steroid induced hyperglycemia  · Added mealtime coverage  · Monitor Accu-Cheks AC + HS with lispro insulin sliding scale coverage    Severe Left Orchalgia  Assessment & Plan  Presents with left testicular pain which has been present for several weeks  Saw an outpatient provider who prescribed cefepime after dialysis for presumed epididymitis  Patient states this pain feels similar to his prior history of epididymitis  · No signs of epididymitis on imaging  · Spoke with Infectious Disease, possible referred pain from severe osteoarthritis of the left hip  · Spoke with Urology, could possibly be related to osteoarthritis causing neuropathy with referred pain to the left testicle or inflamed left epididymal cyst due to trauma  · Recommend anti-inflammatories and tight-fitting underwear for few days  · No need for further antibiotics    SIRS (systemic inflammatory response syndrome) ([facility] [address] )  Assessment & Plan  As evidenced by leukocytosis and fever, likely reactive to severe osteoarthritis  · No further fevers since starting prednisone  · No signs of infection thus far  · Would like to rule out infection of the left hip with a left hip joint aspiration as recommended by Orthopedic surgery  · Planning for left hip aspiration by the musculoskeletal radiologist on 07/29  · Continue prednisone  · Trend fever curve and CBC with diff    Right shoulder pain  Assessment & Plan  Presenting with right shoulder pain which has been present for the past week  Unable to drive his car  Missed dialysis session due to severe right shoulder pain and testicular pain  · No osseous abnormality noted on right shoulder x-ray  · Pain improving with prednisone  · Continue PT/OT  · Appreciate orthopedic input    Hypertension  Assessment & Plan  BP stable  Continue Coreg and nifedipine        VTE Pharmacologic Prophylaxis:   Moderate Risk (Score 3-4) - Pharmacological DVT Prophylaxis Ordered: heparin  Patient Centered Rounds: I performed bedside rounds with nursing staff today  Discussions with Specialists or Other Care Team Provider: Nephrology    Education and Discussions with Family / Patient: Patient declined call to   Time Spent for Care: 30 minutes  More than 50% of total time spent on counseling and coordination of care as described above  Current Length of Stay: 2 day(s)  Current Patient Status: Inpatient   Certification Statement: The patient will continue to require additional inpatient hospital stay due to SIRS r/o Septic arthritis  Discharge Plan: Anticipate discharge tomorrow to home  Code Status: Level 1 - Full Code    Subjective:   Patient s/p hip aspiration  Reported headache and pain to the left hip  Denies shortness of breath, chest pain, nausea or vomiting    Objective:     Vitals:   Temp (24hrs), Av 2 °F (36 8 °C), Min:97 8 °F (36 6 °C), Max:98 6 °F (37 °C)    Temp:  [97 8 °F (36 6 °C)-98 6 °F (37 °C)] 98 6 °F (37 °C)  HR:  [65-89] 65  Resp:  [17-18] 18  BP: (127-160)/(60-74) 150/68  SpO2:  [94 %-98 %] 96 %  Body mass index is 29 09 kg/m²  Input and Output Summary (last 24 hours): Intake/Output Summary (Last 24 hours) at 2022 1718  Last data filed at 2022  Gross per 24 hour   Intake --   Output 200 ml   Net -200 ml       Physical Exam:   Physical Exam  Constitutional:       Appearance: He is ill-appearing  HENT:      Head: Normocephalic  Nose: Nose normal    Eyes:      General: No scleral icterus  Pupils: Pupils are equal, round, and reactive to light  Cardiovascular:      Rate and Rhythm: Normal rate  Pulmonary:      Effort: Pulmonary effort is normal    Abdominal:      General: Bowel sounds are normal  There is no distension  Palpations: Abdomen is soft  Musculoskeletal:         General: Tenderness present  Right lower leg: No edema  Left lower leg: No edema  Comments: Left leg   Skin:     General: Skin is warm  Neurological:      Mental Status: He is alert and oriented to person, place, and time     Psychiatric:         Behavior: Behavior normal          Additional Data:     Labs:  Results from last 7 days   Lab Units 22  0600   WBC Thousand/uL 18 93*   HEMOGLOBIN g/dL 10 9*   HEMATOCRIT % 33 0*   PLATELETS Thousands/uL 350   NEUTROS PCT % 88*   LYMPHS PCT % 5*   MONOS PCT % 6   EOS PCT % 0     Results from last 7 days   Lab Units 07/29/22  0600   SODIUM mmol/L 131*   POTASSIUM mmol/L 3 9   CHLORIDE mmol/L 91*   CO2 mmol/L 29   BUN mg/dL 31*   CREATININE mg/dL 4 17*   ANION GAP mmol/L 11   CALCIUM mg/dL 8 9   ALBUMIN g/dL 2 7*   TOTAL BILIRUBIN mg/dL 0 30   ALK PHOS U/L 154*   ALT U/L 41   AST U/L 41   GLUCOSE RANDOM mg/dL 214*         Results from last 7 days   Lab Units 07/29/22  1626 07/29/22  1138 07/29/22  0738 07/28/22  2119 07/28/22  1622 07/28/22  1116 07/28/22  0727 07/27/22  2118 07/27/22  1556 07/27/22  1142 07/27/22  0707 07/26/22  2049   POC GLUCOSE mg/dl 258* 151* 199* 307* 176* 195* 217* 169* 119 122 118 139         Results from last 7 days   Lab Units 07/27/22  0440 07/26/22  0918   LACTIC ACID mmol/L  --  0 5   PROCALCITONIN ng/ml 0 71* 0 44*       Lines/Drains:  Invasive Devices  Report    Peripheral Intravenous Line  Duration           Peripheral IV 07/26/22 Left Antecubital 3 days          Hemodialysis Catheter  Duration           HD Permanent Double Catheter 183 days                      Imaging: No pertinent imaging reviewed  Recent Cultures (last 7 days):   Results from last 7 days   Lab Units 07/26/22  0948   BLOOD CULTURE  No Growth at 72 hrs  No Growth at 72 hrs         Last 24 Hours Medication List:   Current Facility-Administered Medications   Medication Dose Route Frequency Provider Last Rate    acetaminophen  650 mg Oral Q6H PRN Joeline Loop, CRNP      allopurinol  100 mg Oral Daily Joeline Loop, [place]      atorvastatin  80 mg Oral Daily Joeline Loop, [place]      carvedilol  12 5 mg Oral BID Joeline Loop, CRNP      clopidogrel  75 mg Oral Daily Joeline Loop, [place]      docusate sodium  100 mg Oral BID GRANT Julien      doxercalciferol 1 5 mcg Intravenous After Dialysis Haim Urias MD      epoetin jessica  800 Units Intravenous After Dialysis Bo Villanueva, GRANT      famotidine  10 mg Oral Daily Isaiah Gilmore, GRANT      heparin (porcine)  5,000 Units Subcutaneous [facility] [address] [place]      insulin lispro  1-5 Units Subcutaneous HS [place], [place]      insulin lispro  1-6 Units Subcutaneous TID [place], [place]      insulin lispro  2 Units Subcutaneous TID With Meals Isaiah Gilmore, GRANT      lidocaine  1 patch Topical Daily [place], [place]      methocarbamol  500 mg Oral Q12H PRN Isaiah Rodriguezce, CRАЛЕКСАНДР      ondansetron  4 mg Intravenous Q6H PRN Bo Villanueva, GRANT      oxyCODONE  2 5 mg Oral Q8H PRN Isaiah Peace, CRNP      oxyCODONE-acetaminophen  1 tablet Oral Q6H PRN Isaiah Peace, CRNP      polyethylene glycol  17 g Oral Daily PRN Isaiah Peace, CRNP      predniSONE  40 mg Oral Daily Isaiah Peace, CRNP      sevelamer  800 mg Oral TID With Meals GRANT Dean          Today, Patient Was Seen By: GRANT Adams    **Please Note: This note may have been constructed using a voice recognition system  **

## 2022-07-29 NOTE — PLAN OF CARE
Problem: PAIN - ADULT  Goal: Verbalizes/displays adequate comfort level or baseline comfort level  Description: Interventions:  - Encourage patient to monitor pain and request assistance  - Assess pain using appropriate pain scale  - Administer analgesics based on type and severity of pain and evaluate response  - Implement non-pharmacological measures as appropriate and evaluate response  - Consider cultural and social influences on pain and pain management  - Notify physician/advanced practitioner if interventions unsuccessful or patient reports new pain  Outcome: Progressing     Problem: INFECTION - ADULT  Goal: Absence or prevention of progression during hospitalization  Description: INTERVENTIONS:  - Assess and monitor for signs and symptoms of infection  - Monitor lab/diagnostic results  - Monitor all insertion sites, i e  indwelling lines, tubes, and drains  - Monitor endotracheal if appropriate and nasal secretions for changes in amount and color  - [place] appropriate cooling/warming therapies per order  - Administer medications as ordered  - Instruct and encourage patient and family to use good hand hygiene technique  - Identify and instruct in appropriate isolation precautions for identified infection/condition  Outcome: Progressing     Problem: DISCHARGE PLANNING  Goal: Discharge to home or other facility with appropriate resources  Description: INTERVENTIONS:  - Identify barriers to discharge w/patient and caregiver  - Arrange for needed discharge resources and transportation as appropriate  - Identify discharge learning needs (meds, wound care, etc )  - Arrange for interpretive services to assist at discharge as needed  - Refer to Case Management Department for coordinating discharge planning if the patient needs post-hospital services based on physician/advanced practitioner order or complex needs related to functional status, cognitive ability, or social support system  Outcome: Progressing Problem: Knowledge Deficit  Goal: Patient/family/caregiver demonstrates understanding of disease process, treatment plan, medications, and discharge instructions  Description: Complete learning assessment and assess knowledge base    Interventions:  - Provide teaching at level of understanding  - Provide teaching via preferred learning methods  Outcome: Progressing     Problem: METABOLIC, FLUID AND ELECTROLYTES - ADULT  Goal: Fluid balance maintained  Description: INTERVENTIONS:  - Monitor labs   - Monitor I/O and WT  - Instruct patient on fluid and nutrition as appropriate  - Assess for signs & symptoms of volume excess or deficit  Outcome: Progressing  Goal: Glucose maintained within target range  Description: INTERVENTIONS:  - Monitor Blood Glucose as ordered  - Assess for signs and symptoms of hyperglycemia and hypoglycemia  - Administer ordered medications to maintain glucose within target range  - Assess nutritional intake and initiate nutrition service referral as needed  Outcome: Progressing  Goal: Electrolytes maintained within normal limits  Description: INTERVENTIONS:  - Monitor labs and assess patient for signs and symptoms of electrolyte imbalances  - Administer electrolyte replacement as ordered  - Monitor response to electrolyte replacements, including repeat lab results as appropriate  - Instruct patient on fluid and nutrition as appropriate  Outcome: Progressing     Problem: MUSCULOSKELETAL - ADULT  Goal: Maintain or return mobility to safest level of function  Description: INTERVENTIONS:  - Assess patient's ability to carry out ADLs; assess patient's baseline for ADL function and identify physical deficits which impact ability to perform ADLs (bathing, care of mouth/teeth, toileting, grooming, dressing, etc )  - Assess/evaluate cause of self-care deficits   - Assess range of motion  - Assess patient's mobility  - Assess patient's need for assistive devices and provide as appropriate  - Encourage maximum independence but intervene and supervise when necessary  - Involve family in performance of ADLs  - Assess for home care needs following discharge   - Consider OT consult to assist with ADL evaluation and planning for discharge  - Provide patient education as appropriate  Outcome: Progressing  Goal: Maintain proper alignment of affected body part  Description: INTERVENTIONS:  - Support, maintain and protect limb and body alignment  - Provide patient/ family with appropriate education  Outcome: Progressing

## 2022-07-29 NOTE — PROGRESS NOTES
Patient arrived to radiology department for Fluoro procedure  Orders from medical team were for IR Guided joint aspiration of LEFT hip to rule-out septic arthritis  Time-out called @ 08:57am with patient Michael Cui, Iris Gardner MD, Eri Tejeda RT(R)(F) & myself Beverly Martinez RN prior to procedure  Fluid aspiration attempted, needle repositioned x 3 by Dr Fransisco Enriquez in attempt to optimize positioning for collection, 0mL fluid yield even with multiple re-positioning attempts      Patient aware cultures unable to be collected & that Radiology would notify attending care team   Preeti Huitron RN 07/29/2022

## 2022-07-29 NOTE — PROGRESS NOTES
22 1200   Clinical Encounter Type   Visited With Patient   Routine Visit Follow-up    Pastoral Care Progress Note    2022  Patient: Jake Haley : 1960  Admission Date & Time: 2022 4729  MRN: [id number] CSN: [id number]        Patient declined follow-up  visit but appreciated the request  If anything arises, please contact spiritual care

## 2022-07-30 ENCOUNTER — APPOINTMENT (INPATIENT)
Dept: DIALYSIS | Facility: HOSPITAL | Age: 62
DRG: 553 | End: 2022-07-30
Attending: INTERNAL MEDICINE
Payer: MEDICARE

## 2022-07-30 LAB
ANION GAP SERPL CALCULATED.3IONS-SCNC: 12 MMOL/L (ref 4–13)
BASOPHILS # BLD AUTO: 0.03 THOUSANDS/ΜL (ref 0–0.1)
BASOPHILS NFR BLD AUTO: 0 % (ref 0–1)
BUN SERPL-MCNC: 53 MG/DL (ref 5–25)
CALCIUM SERPL-MCNC: 8.5 MG/DL (ref 8.3–10.1)
CHLORIDE SERPL-SCNC: 95 MMOL/L (ref 96–108)
CO2 SERPL-SCNC: 27 MMOL/L (ref 21–32)
CREAT SERPL-MCNC: 5.57 MG/DL (ref 0.6–1.3)
EOSINOPHIL # BLD AUTO: 0.03 THOUSAND/ΜL (ref 0–0.61)
EOSINOPHIL NFR BLD AUTO: 0 % (ref 0–6)
ERYTHROCYTE [DISTWIDTH] IN BLOOD BY AUTOMATED COUNT: 14.5 % (ref 11.6–15.1)
GFR SERPL CREATININE-BSD FRML MDRD: 9 ML/MIN/1.73SQ M
GLUCOSE SERPL-MCNC: 143 MG/DL (ref 65–140)
GLUCOSE SERPL-MCNC: 149 MG/DL (ref 65–140)
GLUCOSE SERPL-MCNC: 160 MG/DL (ref 65–140)
GLUCOSE SERPL-MCNC: 179 MG/DL (ref 65–140)
GLUCOSE SERPL-MCNC: 188 MG/DL (ref 65–140)
GLUCOSE SERPL-MCNC: 366 MG/DL (ref 65–140)
HCT VFR BLD AUTO: 29.9 % (ref 36.5–49.3)
HGB BLD-MCNC: 9.6 G/DL (ref 12–17)
IMM GRANULOCYTES # BLD AUTO: 0.32 THOUSAND/UL (ref 0–0.2)
IMM GRANULOCYTES NFR BLD AUTO: 2 % (ref 0–2)
LYMPHOCYTES # BLD AUTO: 1.34 THOUSANDS/ΜL (ref 0.6–4.47)
LYMPHOCYTES NFR BLD AUTO: 10 % (ref 14–44)
MCH RBC QN AUTO: 28.7 PG (ref 26.8–34.3)
MCHC RBC AUTO-ENTMCNC: 32.1 G/DL (ref 31.4–37.4)
MCV RBC AUTO: 89 FL (ref 82–98)
MONOCYTES # BLD AUTO: 1.32 THOUSAND/ΜL (ref 0.17–1.22)
MONOCYTES NFR BLD AUTO: 10 % (ref 4–12)
NEUTROPHILS # BLD AUTO: 10.92 THOUSANDS/ΜL (ref 1.85–7.62)
NEUTS SEG NFR BLD AUTO: 78 % (ref 43–75)
NRBC BLD AUTO-RTO: 0 /100 WBCS
PLATELET # BLD AUTO: 277 THOUSANDS/UL (ref 149–390)
PMV BLD AUTO: 9.4 FL (ref 8.9–12.7)
POTASSIUM SERPL-SCNC: 4.1 MMOL/L (ref 3.5–5.3)
RBC # BLD AUTO: 3.35 MILLION/UL (ref 3.88–5.62)
SODIUM SERPL-SCNC: 134 MMOL/L (ref 135–147)
WBC # BLD AUTO: 13.96 THOUSAND/UL (ref 4.31–10.16)

## 2022-07-30 PROCEDURE — 82948 REAGENT STRIP/BLOOD GLUCOSE: CPT

## 2022-07-30 PROCEDURE — 85025 COMPLETE CBC W/AUTO DIFF WBC: CPT

## 2022-07-30 PROCEDURE — 99232 SBSQ HOSP IP/OBS MODERATE 35: CPT | Performed by: INTERNAL MEDICINE

## 2022-07-30 PROCEDURE — 80048 BASIC METABOLIC PNL TOTAL CA: CPT

## 2022-07-30 PROCEDURE — 99232 SBSQ HOSP IP/OBS MODERATE 35: CPT | Performed by: FAMILY MEDICINE

## 2022-07-30 RX ORDER — NIFEDIPINE 30 MG/1
30 TABLET, FILM COATED, EXTENDED RELEASE ORAL 2 TIMES DAILY
Status: DISCONTINUED | OUTPATIENT
Start: 2022-07-30 | End: 2022-07-30

## 2022-07-30 RX ORDER — NIFEDIPINE 30 MG/1
60 TABLET, EXTENDED RELEASE ORAL DAILY
Status: DISCONTINUED | OUTPATIENT
Start: 2022-07-30 | End: 2022-07-31 | Stop reason: HOSPADM

## 2022-07-30 RX ADMIN — NIFEDIPINE 60 MG: 30 TABLET, EXTENDED RELEASE ORAL at 20:32

## 2022-07-30 RX ADMIN — OXYCODONE HYDROCHLORIDE AND ACETAMINOPHEN 1 TABLET: 5; 325 TABLET ORAL at 06:03

## 2022-07-30 RX ADMIN — HEPARIN SODIUM 5000 UNITS: 5000 INJECTION INTRAVENOUS; SUBCUTANEOUS at 00:12

## 2022-07-30 RX ADMIN — INSULIN LISPRO 4 UNITS: 100 INJECTION, SOLUTION INTRAVENOUS; SUBCUTANEOUS at 21:53

## 2022-07-30 RX ADMIN — ATORVASTATIN CALCIUM 80 MG: 80 TABLET ORAL at 08:38

## 2022-07-30 RX ADMIN — CARVEDILOL 12.5 MG: 12.5 TABLET, FILM COATED ORAL at 18:01

## 2022-07-30 RX ADMIN — HEPARIN SODIUM 5000 UNITS: 5000 INJECTION INTRAVENOUS; SUBCUTANEOUS at 23:42

## 2022-07-30 RX ADMIN — EPOETIN ALFA 800 UNITS: 2000 SOLUTION INTRAVENOUS; SUBCUTANEOUS at 16:07

## 2022-07-30 RX ADMIN — INSULIN LISPRO 1 UNITS: 100 INJECTION, SOLUTION INTRAVENOUS; SUBCUTANEOUS at 18:01

## 2022-07-30 RX ADMIN — PREDNISONE 40 MG: 20 TABLET ORAL at 08:38

## 2022-07-30 RX ADMIN — CARVEDILOL 12.5 MG: 12.5 TABLET, FILM COATED ORAL at 08:37

## 2022-07-30 RX ADMIN — DOXERCALCIFEROL 1.5 MCG: 4 INJECTION, SOLUTION INTRAVENOUS at 16:08

## 2022-07-30 RX ADMIN — CLOPIDOGREL BISULFATE 75 MG: 75 TABLET ORAL at 08:37

## 2022-07-30 RX ADMIN — HEPARIN SODIUM 5000 UNITS: 5000 INJECTION INTRAVENOUS; SUBCUTANEOUS at 18:01

## 2022-07-30 RX ADMIN — INSULIN LISPRO 2 UNITS: 100 INJECTION, SOLUTION INTRAVENOUS; SUBCUTANEOUS at 18:02

## 2022-07-30 RX ADMIN — FAMOTIDINE 10 MG: 20 TABLET ORAL at 08:37

## 2022-07-30 RX ADMIN — HEPARIN SODIUM 5000 UNITS: 5000 INJECTION INTRAVENOUS; SUBCUTANEOUS at 08:38

## 2022-07-30 RX ADMIN — ALLOPURINOL 100 MG: 100 TABLET ORAL at 08:38

## 2022-07-30 RX ADMIN — INSULIN LISPRO 2 UNITS: 100 INJECTION, SOLUTION INTRAVENOUS; SUBCUTANEOUS at 08:38

## 2022-07-30 NOTE — PROGRESS NOTES
NEPHROLOGY PROGRESS NOTE   Dewayne Hernandez 60 y o  male MRN: [id number]  Unit/Bed#: [facility] 328-01 Encounter: [id number]    ASSESSMENT & PLAN:  Yun Amaral a 60 y  o  male who was admitted to [address] [place] [address] testicular pain and left shoulder pain  End-stage renal disease on dialysis  -Outpatient unit: At [facility]  Outpatient nephrologist Dr Jonah Deleon  -Schedule:  TTS  -Access:  Right IJ PermCath  -Plan:  Last dialysis was on 07/28, post dialysis weight was not checked but dry weight around 104 kg, patient was below the dry weight  Plan for HD today    Volume status  -continue UF on HD    CKD/MBD  -hyperphosphatemia/secondary hyperparathyroidism of renal origin:  Continue Renvela and Hectorol  Monitor PTH and phosphorus as outpatient, last phosphorus level was 3 6 on 07/29    Blood pressure  -primary hypertension:  Blood pressure stable, continue Coreg, avoid hypotension    Electrolytes:   -sodium 134/hyponatremia:  Recommend fluid restriction 1 5 L per day    Anemia due to ESRD  -Goal hemoglobin:  10-11 grams/deciliter  -Current hemoglobin:  Below goal at 9 6 g/dl  -Plan:  Continue Epogen 800 units with his dialysis session  Monitor hemoglobin    Left testicular pain, was treated empirically with cefepime as outpatient  Ultrasound of scrotum after admission no acute abnormality  Was suspected of having referred pain from the left hip and aspiration was attempted but no fluid removed  Patient now off antibiotics  Defer to primary team   Primary team also discussed with ID and with Urology     Does have leukocytosis but improving  Blood cultures negative    Discussed with primary team   Disposition per primary team   Charlotte Vu for discharge after HD today from Nephrology side    SUBJECTIVE:  Still with left testicular pain    No chest pain or SOB     OBJECTIVE:  Current Weight: Weight - Scale: 101 kg (222 lb 7 1 oz)  Vitals:    07/30/22 0700   BP: (!) 181/78 Pulse: 60   Resp: 18   Temp: 98 1 °F (36 7 °C)   SpO2: 97%     No intake or output data in the 24 hours ending 07/30/22 0846    Physical Exam  General:  Ill looking, awake  Eyes: Conjunctivae pink,  Sclera anicteric  ENT: lips and mucous membranes moist  Neck: supple   Chest: Clear to Auscultation both lungs,  no crackles, ronchus or wheezing  CVS: S1 & S2 present, normal rate, regular rhythm, no murmur    Abdomen: soft, non-tender, non-distended, Bowel sounds normoactive  Extremities: no edema of  legs  Skin: no rash  Neuro: awake, alert, oriented x 3   Psych: Mood and affect appropriate     Medications:    Current Facility-Administered Medications:     acetaminophen (TYLENOL) tablet 650 mg, 650 mg, Oral, Q6H PRN, GRANT Crespo, 650 mg at 07/29/22 1232    allopurinol (ZYLOPRIM) tablet 100 mg, 100 mg, Oral, Daily, GRANT Crespo, 100 mg at 07/30/22 2719    atorvastatin (LIPITOR) tablet 80 mg, 80 mg, Oral, Daily, RAMEZ CrespoNP, 80 mg at 07/30/22 2778    carvedilol (COREG) tablet 12 5 mg, 12 5 mg, Oral, BID, GRANT Crespo, 12 5 mg at 07/30/22 0921    clopidogrel (PLAVIX) tablet 75 mg, 75 mg, Oral, Daily, Santiago Kim CRNP, 75 mg at 07/30/22 2706    docusate sodium (COLACE) capsule 100 mg, 100 mg, Oral, BID, Albino Whittington CRNP, 100 mg at 07/30/22 5173    doxercalciferol (HECTOROL) injection 1 5 mcg, 1 5 mcg, Intravenous, After Dialysis, Carlos Santos MD, 1 5 mcg at 07/27/22 1103    epoetin jessica (EPOGEN,PROCRIT) injection 800 Units, 800 Units, Intravenous, After Dialysis, GRANT Crespo, 800 Units at 07/27/22 1102    famotidine (PEPCID) tablet 10 mg, 10 mg, Oral, Daily, Geraldinee Nelsy CRNP, 10 mg at 07/30/22 6936    heparin (porcine) subcutaneous injection 5,000 Units, 5,000 Units, Subcutaneous, Q8H [address], GRANT Crespo, 5,000 Units at 07/30/22 0228    insulin lispro (HumaLOG) 100 units/mL subcutaneous injection 1-5 Units, 1-5 Units, Subcutaneous, HS, GRANT Vann, 3 Units at 07/29/22 2125    insulin lispro (HumaLOG) 100 units/mL subcutaneous injection 1-6 Units, 1-6 Units, Subcutaneous, TID AC, 3 Units at 07/29/22 1655 **AND** Fingerstick Glucose (POCT), , , TID AC, GRANT Vann    insulin lispro (HumaLOG) 100 units/mL subcutaneous injection 2 Units, 2 Units, Subcutaneous, TID With Meals, Indigo Scott, CRNP, 2 Units at 07/30/22 9177    lidocaine (LIDODERM) 5 % patch 1 patch, 1 patch, Topical, Daily, GRANT Vann    methocarbamol (ROBAXIN) tablet 500 mg, 500 mg, Oral, Q12H PRN, Indigo Nickel, CRNP    ondansetron [facility]) injection 4 mg, 4 mg, Intravenous, Q6H PRN, GRANT Vann    oxyCODONE (ROXICODONE) IR tablet 2 5 mg, 2 5 mg, Oral, Q8H PRN, Indigo Nickel, CRNP    oxyCODONE-acetaminophen (PERCOCET) 5-325 mg per tablet 1 tablet, 1 tablet, Oral, Q6H PRN, Indigo Nickel, CRNP, 1 tablet at 07/30/22 0603    polyethylene glycol (MIRALAX) packet 17 g, 17 g, Oral, Daily PRN, Indigo Nickel, CRNP    predniSONE tablet 40 mg, 40 mg, Oral, Daily, Indigo Nickel, CRNP, 40 mg at 07/30/22 6717    sevelamer (RENAGEL) tablet 800 mg, 800 mg, Oral, TID With Meals, RAMEZ VannNP, 800 mg at 07/30/22 1753    Invasive Devices:        Lab Results:   Results from last 7 days   Lab Units 07/30/22  0609 07/29/22  0600 07/28/22  0533 07/27/22  0440 07/26/22  0918   WBC Thousand/uL 13 96* 18 93* 18 27* 14 92* 14 89*   HEMOGLOBIN g/dL 9 6* 10 9* 10 2* 9 0* 9 8*   HEMATOCRIT % 29 9* 33 0* 31 5* 27 6* 30 4*   PLATELETS Thousands/uL 277 350 332 287 321   POTASSIUM mmol/L 4 1 3 9 4 3 4 6 4 4   CHLORIDE mmol/L 95* 91* 93* 98 95*   CO2 mmol/L 27 29 26 24 26   BUN mg/dL 53* 31* 29* 52* 52*   CREATININE mg/dL 5 57* 4 17* 5 17* 7 80* 7 84*   CALCIUM mg/dL 8 5 8 9 8 8 8 4 8 6   MAGNESIUM mg/dL  --  2 1  --   --  2 0   PHOSPHORUS mg/dL  --  3 6 4 8* 5 1* 5 1*   ALK PHOS U/L  --  154*  --   --  105   ALT U/L  --  41  --   --  19   AST U/L  --  41  --   --  16       Previous work up:        Portions of the record may have been created with voice recognition software  Occasional wrong word or "sound a like" substitutions may have occurred due to the inherent limitations of voice recognition software  Read the chart carefully and recognize, using context, where substitutions have occurred  If you have any questions, please contact the dictating provider

## 2022-07-30 NOTE — PLAN OF CARE
Pt on Hd treatment for 3 5 hours with even UF   Pt on a 3 k 2 5 basim bath for a potassium of 4 1 on 07/30/22  Problem: METABOLIC, FLUID AND ELECTROLYTES - ADULT  Goal: Fluid balance maintained  Description: INTERVENTIONS:  - Monitor labs   - Monitor I/O and WT  - Instruct patient on fluid and nutrition as appropriate  - Assess for signs & symptoms of volume excess or deficit  Outcome: Progressing  Goal: Electrolytes maintained within normal limits  Description: INTERVENTIONS:  - Monitor labs and assess patient for signs and symptoms of electrolyte imbalances  - Administer electrolyte replacement as ordered  - Monitor response to electrolyte replacements, including repeat lab results as appropriate  - Instruct patient on fluid and nutrition as appropriate  Outcome: Progressing

## 2022-07-30 NOTE — ASSESSMENT & PLAN NOTE
Lab Results   Component Value Date    HGBA1C 6 6 (H) 07/07/2022       Recent Labs     07/30/22  1607 07/30/22  2044 07/31/22  0720 07/31/22  1128   POCGLU 188* 366* 210* 198*     · He was not taking his Linagliptin as he ran out of it   · Rx provided on discharge  · Now with steroid induced hyperglycemia although blood sugars have improved today  · Patient made aware of sugars running high at home due to steroids however this should improve as prednisone is tapered off

## 2022-07-30 NOTE — ASSESSMENT & PLAN NOTE
Lab Results   Component Value Date    EGFR 9 07/30/2022    EGFR 14 07/29/2022    EGFR 10 07/28/2022    CREATININE 5 57 (H) 07/30/2022    CREATININE 4 17 (H) 07/29/2022    CREATININE 5 17 (H) 07/28/2022     Patient follows with [facility] outpatient;  Waclrih-Wfwukfwe-Jbqoszdl via right anterior chest wall PermCath  · Received dialysis per Nephrology while here  · Patient advised to follow-up after discharge for his next scheduled dialysis

## 2022-07-30 NOTE — ASSESSMENT & PLAN NOTE
As evidenced by leukocytosis and fever, likely reactive to severe osteoarthritis  · No further fevers since starting prednisone  · No signs of infection thus far  · Blood cultures negative  · Patient with persistent leukocytosis which is likely steroid mediated

## 2022-07-30 NOTE — ASSESSMENT & PLAN NOTE
Left hip osteoarthritis as noted on imaging  Left hip pain could be the cause of his left testicular pain  · Follow-up with orthopedics after discharge  · CRP and ESR elevated  · Hip aspiration attempted on 07/29, yielded 0 mL fluid  No culture was collected  · Continue prednisone which will be tapered down on discharge  · Patient to follow-up with PCP after discharge    Patient currently not interested in outpatient pain management

## 2022-07-30 NOTE — PROGRESS NOTES
[facility] Progress Note  Patient: Alli Chen 60 y o  male   MRN: [id number]  PCP: Rashawn Mcfarlane MD  Unit/Bed#: [address] [place] [address] Encounter: [id number]  Date Of Visit: 07/30/22    Problem List:    Principal Problem:    Osteoarthritis of left hip  Active Problems:    ESRD (end stage renal disease) ([facility] )    Severe Left Orchalgia    Type 2 diabetes mellitus, without long-term current use of insulin ([facility])    Right shoulder pain    SIRS (systemic inflammatory response syndrome) ([facility] )    Hypertension      Assessment & Plan:    * Osteoarthritis of left hip  Assessment & Plan  Left hip osteoarthritis as noted on imaging  Left hip pain could be the cause of his left testicular pain  · Appreciate orthopedic input  · CRP and ESR elevated  · Hip aspiration attempted on 07/29, yielded 0 mL fluid  No culture was collected  · Continue prednisone  · Patient to follow-up with outpatient pain management/orthopedic/PCP    Severe Left Orchalgia  Assessment & Plan  Presents with left testicular pain which has been present for several weeks  Saw an outpatient provider who prescribed cefepime after dialysis for presumed epididymitis  Stated this pain feels similar to his prior history of epididymitis    · No signs of epididymitis on imaging  · Spoke with Infectious Disease, possible referred pain from severe osteoarthritis of the left hip  · Spoke with Urology, could possibly be related to osteoarthritis causing neuropathy with referred pain to the left testicle or inflamed left epididymal cyst due to trauma  · Recommend tight-fitting underwear for few days  · No indication for antibiotics for this    ESRD (end stage renal disease) [facility])  Assessment & Plan  Lab Results   Component Value Date    EGFR 9 07/30/2022    EGFR 14 07/29/2022    EGFR 10 07/28/2022    CREATININE 5 57 (H) 07/30/2022    CREATININE 4 17 (H) 07/29/2022    CREATININE 5 17 (H) 07/28/2022     Patient follows with [facility] outpatient; Mzipxhu-Ebcyqdxu-Ugejtkfs via right anterior chest wall PermCath  · Nephrology following for dialysis management  · Underwent hemodialysis today per Nephrology  · Continue Renagel  · Avoid nephrotoxic agents and relative hypotension    Right shoulder pain  Assessment & Plan  Presented with right shoulder pain which has been present for the past week  Unable to drive his car  Missed dialysis session due to severe right shoulder pain and testicular pain  · No osseous abnormality noted on right shoulder x-ray  · Continue prednisone, Pain improving  · Appreciate orthopedic input    Type 2 diabetes mellitus, without long-term current use of insulin [facility])  Assessment & Plan  Lab Results   Component Value Date    HGBA1C 6 6 (H) 07/07/2022       Recent Labs     07/30/22  0742 07/30/22  1116 07/30/22  1244 07/30/22  1607   POCGLU 143* 149* 179* 188*     · Not taking his Linagliptin as he does not have any further tablets  · Now with steroid induced hyperglycemia although blood sugars have improved today  · Monitor Accu-Cheks AC + HS with lispro insulin sliding scale coverage    SIRS (systemic inflammatory response syndrome) ([facility])  Assessment & Plan  As evidenced by leukocytosis and fever, likely reactive to severe osteoarthritis  · No further fevers since starting prednisone  · No signs of infection thus far  · Blood cultures negative  · Patient with persistent leukocytosis which is likely steroid mediated    Hypertension  Assessment & Plan  Continue Coreg  Restart Nifedipine as blood pressures are now running high      VTE Pharmacologic Prophylaxis:   Heparin    Patient Centered Rounds: I performed bedside rounds with nursing staff today  Discussions with Specialists or Other Care Team Provider: yes    Education and Discussions with Family / Patient: yes    Time Spent for Care: 30 minutes  More than 50% of total time spent on counseling and coordination of care as described above      Current Length of Stay: 3 day(s)  Current Patient Status: Inpatient   Certification Statement: The patient will continue to require additional inpatient hospital stay due to Right shoulder/hip pain  Discharge Plan: Anticipate discharge tomorrow to home  Code Status: Level 1 - Full Code    Subjective:     Reports some worsening of right shoulder pain today  Objective:     Vitals:   Temp (24hrs), Av 1 °F (36 7 °C), Min:97 6 °F (36 4 °C), Max:98 8 °F (37 1 °C)    Temp:  [97 6 °F (36 4 °C)-98 8 °F (37 1 °C)] 97 9 °F (36 6 °C)  HR:  [60-69] 66  Resp:  [14-18] 14  BP: (159-187)/(69-90) 184/85  SpO2:  [94 %-98 %] 97 %  Body mass index is 29 35 kg/m²  Input and Output Summary (last 24 hours): Intake/Output Summary (Last 24 hours) at 2022 1912  Last data filed at 2022 1620  Gross per 24 hour   Intake 500 ml   Output 500 ml   Net 0 ml       Physical Exam:   Physical Exam  Constitutional:       General: He is not in acute distress  Appearance: He is well-developed  HENT:      Head: Normocephalic and atraumatic  Eyes:      General: No scleral icterus  Cardiovascular:      Rate and Rhythm: Normal rate and regular rhythm  Pulmonary:      Effort: Pulmonary effort is normal  No respiratory distress  Breath sounds: Normal breath sounds  No wheezing  Abdominal:      General: Bowel sounds are normal  There is no distension  Palpations: Abdomen is soft  Tenderness: There is no abdominal tenderness  Neurological:      Mental Status: He is alert and oriented to person, place, and time           Additional Data:     Labs:  Results from last 7 days   Lab Units 22  0609   WBC Thousand/uL 13 96*   HEMOGLOBIN g/dL 9 6*   HEMATOCRIT % 29 9*   PLATELETS Thousands/uL 277   NEUTROS PCT % 78*   LYMPHS PCT % 10*   MONOS PCT % 10   EOS PCT % 0     Results from last 7 days   Lab Units 22  0609 22  0600   SODIUM mmol/L 134* 131*   POTASSIUM mmol/L 4 1 3 9   CHLORIDE mmol/L 95* 91*   CO2 mmol/L 27 29 BUN mg/dL 53* 31*   CREATININE mg/dL 5 57* 4 17*   ANION GAP mmol/L 12 11   CALCIUM mg/dL 8 5 8 9   ALBUMIN g/dL  --  2 7*   TOTAL BILIRUBIN mg/dL  --  0 30   ALK PHOS U/L  --  154*   ALT U/L  --  41   AST U/L  --  41   GLUCOSE RANDOM mg/dL 160* 214*         Results from last 7 days   Lab Units 07/30/22  1607 07/30/22  1244 07/30/22  1116 07/30/22  0742 07/29/22  2122 07/29/22  1626 07/29/22  1138 07/29/22  0738 07/28/22  2119 07/28/22  1622 07/28/22  1116 07/28/22  0727   POC GLUCOSE mg/dl 188* 179* 149* 143* 288* 258* 151* 199* 307* 176* 195* 217*         Results from last 7 days   Lab Units 07/27/22  0440 07/26/22  0918   LACTIC ACID mmol/L  --  0 5   PROCALCITONIN ng/ml 0 71* 0 44*       Lines/Drains:  Invasive Devices  Report    Peripheral Intravenous Line  Duration           Peripheral IV 07/26/22 Left Antecubital 4 days          Hemodialysis Catheter  Duration           HD Permanent Double Catheter 184 days              Imaging: Reviewed radiology reports from this admission including: xray(s)    Recent Cultures (last 7 days):   Results from last 7 days   Lab Units 07/26/22  0948   BLOOD CULTURE  No Growth After 4 Days  No Growth After 4 Days         Last 24 Hours Medication List:   Current Facility-Administered Medications   Medication Dose Route Frequency Provider Last Rate    acetaminophen  650 mg Oral Q6H PRN Britney GRANT Bellamy      allopurinol  100 mg Oral Daily [place]      atorvastatin  80 mg Oral Daily [place]      carvedilol  12 5 mg Oral BID Britney GRANT Bellamy      clopidogrel  75 mg Oral Daily [place]      docusate sodium  100 mg Oral BID GRANT Danielle      doxercalciferol  1 5 mcg Intravenous After Dialysis Wilner Servin MD      epoetin jessica  800 Units Intravenous After Dialysis GRANT Ratliff      famotidine  10 mg Oral Daily GRANT Danielle      heparin (porcine)  5,000 Units Subcutaneous [address] [place]      insulin lispro  1-5 Units Subcutaneous HS [address] [place] [address] Dr [place]      insulin lispro  1-6 Units Subcutaneous TID [facility] Mike Leslie      lidocaine  1 patch Topical Daily Kirk Ramírez [place]      methocarbamol  500 mg Oral Q12H PRN GRANT Woods      ondansetron  4 mg Intravenous Q6H PRN GRANT Leslie      oxyCODONE  2 5 mg Oral Q8H PRN GRANT Woods      oxyCODONE-acetaminophen  1 tablet Oral Q6H PRN GRANT Woods      polyethylene glycol  17 g Oral Daily PRN GRANT Woods      predniSONE  40 mg Oral Daily Mike Woods      sevelamer  800 mg Oral TID With Meals GRANT Leslie          Today, Patient Was Seen By: Albin Cox DO    ** Please Note: "This note has been constructed using a voice recognition system  Therefore there may be syntax, spelling, and/or grammatical errors   Please call if you have any questions  "**

## 2022-07-30 NOTE — ASSESSMENT & PLAN NOTE
Presented with left testicular pain which has been present for several weeks  Saw an outpatient provider who prescribed cefepime after dialysis for presumed epididymitis  Stated this pain felt similar to his prior history of epididymitis    · No signs of epididymitis on imaging  · Prior provider Spoke with Infectious Disease, possible referred pain from severe osteoarthritis of the left hip  · Prior provider Spoke with Urology, could possibly be related to osteoarthritis causing neuropathy with referred pain to the left testicle or inflamed left epididymal cyst due to trauma  · Recommended tight-fitting underwear for few days  · No indication for antibiotics for this

## 2022-07-30 NOTE — HEMODIALYSIS
Post-Dialysis RN Treatment Note    Blood Pressure:  Pre 164/77 mm/Hg  Post 184/85 mmHg   EDW  104 kg    Weight:  Pre 103 kg   Post 102 5 kg   Mode of weight measurement: Bed Scale   Volume Removed  0 ml    Treatment duration 210 minutes    NS given  No    Treatment shortened?  No   Medications given during Rx Epogen, Hectorol   Estimated Kt/V  None Reported   Access type: Permacath/TDC   Access Issues: No    Report called to primary nurse   Yes

## 2022-07-30 NOTE — ASSESSMENT & PLAN NOTE
Presented with right shoulder pain which had been present for the past week  Unable to drive his car  Missed dialysis session due to severe right shoulder pain and testicular pain    · No osseous abnormality noted on right shoulder x-ray  · Continue prednisone taper on discharge, Pain improving

## 2022-07-30 NOTE — PLAN OF CARE
Problem: Potential for Falls  Goal: Patient will remain free of falls  Description: INTERVENTIONS:  - Educate patient/family on patient safety including physical limitations  - Instruct patient to call for assistance with activity   - Consult OT/PT to assist with strengthening/mobility   - Keep Call bell within reach  - Keep bed low and locked with side rails adjusted as appropriate  - Keep care items and personal belongings within reach  - Initiate and maintain comfort rounds  - Make Fall Risk Sign visible to staff  - Offer Toileting every 2 Hours, in advance of need  - Initiate/Maintain bed alarm  - Obtain necessary fall risk management equipment: bed alarm   - Apply yellow socks and bracelet for high fall risk patients  - Consider moving patient to room near nurses station  Outcome: Progressing     Problem: PAIN - ADULT  Goal: Verbalizes/displays adequate comfort level or baseline comfort level  Description: Interventions:  - Encourage patient to monitor pain and request assistance  - Assess pain using appropriate pain scale  - Administer analgesics based on type and severity of pain and evaluate response  - Implement non-pharmacological measures as appropriate and evaluate response  - Consider cultural and social influences on pain and pain management  - Notify physician/advanced practitioner if interventions unsuccessful or patient reports new pain  Outcome: Progressing     Problem: INFECTION - ADULT  Goal: Absence or prevention of progression during hospitalization  Description: INTERVENTIONS:  - Assess and monitor for signs and symptoms of infection  - Monitor lab/diagnostic results  - Monitor all insertion sites, i e  indwelling lines, tubes, and drains  - Monitor endotracheal if appropriate and nasal secretions for changes in amount and color  - [place] appropriate cooling/warming therapies per order  - Administer medications as ordered  - Instruct and encourage patient and family to use good hand hygiene technique  - Identify and instruct in appropriate isolation precautions for identified infection/condition  Outcome: Progressing  Goal: Absence of fever/infection during neutropenic period  Description: INTERVENTIONS:  - Monitor WBC    Outcome: Progressing     Problem: SAFETY ADULT  Goal: Patient will remain free of falls  Description: INTERVENTIONS:  - Educate patient/family on patient safety including physical limitations  - Instruct patient to call for assistance with activity   - Consult OT/PT to assist with strengthening/mobility   - Keep Call bell within reach  - Keep bed low and locked with side rails adjusted as appropriate  - Keep care items and personal belongings within reach  - Initiate and maintain comfort rounds  - Make Fall Risk Sign visible to staff  - Offer Toileting every 2 Hours, in advance of need  - Initiate/Maintain bed alarm  - Obtain necessary fall risk management equipment: bed alarm, yellow socks   - Apply yellow socks and bracelet for high fall risk patients  - Consider moving patient to room near nurses station  Outcome: Progressing  Goal: Maintain or return to baseline ADL function  Description: INTERVENTIONS:  -  Assess patient's ability to carry out ADLs; assess patient's baseline for ADL function and identify physical deficits which impact ability to perform ADLs (bathing, care of mouth/teeth, toileting, grooming, dressing, etc )  - Assess/evaluate cause of self-care deficits   - Assess range of motion  - Assess patient's mobility; develop plan if impaired  - Assess patient's need for assistive devices and provide as appropriate  - Encourage maximum independence but intervene and supervise when necessary  - Involve family in performance of ADLs  - Assess for home care needs following discharge   - Consider OT consult to assist with ADL evaluation and planning for discharge  - Provide patient education as appropriate  Outcome: Progressing  Goal: Maintains/Returns to pre admission functional level  Description: INTERVENTIONS:  - Perform BMAT or MOVE assessment daily    - Set and communicate daily mobility goal to care team and patient/family/caregiver  - Collaborate with rehabilitation services on mobility goals if consulted  - Perform Range of Motion 4 times a day  - Reposition patient every 2 hours  - Dangle patient 3 times a day  - Stand patient 3 times a day  - Ambulate patient 3 times a day  - Out of bed to chair 3 times a day   - Out of bed for meals 3 times a day  - Out of bed for toileting  - Record patient progress and toleration of activity level   Outcome: Progressing     Problem: DISCHARGE PLANNING  Goal: Discharge to home or other facility with appropriate resources  Description: INTERVENTIONS:  - Identify barriers to discharge w/patient and caregiver  - Arrange for needed discharge resources and transportation as appropriate  - Identify discharge learning needs (meds, wound care, etc )  - Arrange for interpretive services to assist at discharge as needed  - Refer to Case Management Department for coordinating discharge planning if the patient needs post-hospital services based on physician/advanced practitioner order or complex needs related to functional status, cognitive ability, or social support system  Outcome: Progressing     Problem: Knowledge Deficit  Goal: Patient/family/caregiver demonstrates understanding of disease process, treatment plan, medications, and discharge instructions  Description: Complete learning assessment and assess knowledge base    Interventions:  - Provide teaching at level of understanding  - Provide teaching via preferred learning methods  Outcome: Progressing     Problem: METABOLIC, FLUID AND ELECTROLYTES - ADULT  Goal: Fluid balance maintained  Description: INTERVENTIONS:  - Monitor labs   - Monitor I/O and WT  - Instruct patient on fluid and nutrition as appropriate  - Assess for signs & symptoms of volume excess or deficit  Outcome: Progressing  Goal: Glucose maintained within target range  Description: INTERVENTIONS:  - Monitor Blood Glucose as ordered  - Assess for signs and symptoms of hyperglycemia and hypoglycemia  - Administer ordered medications to maintain glucose within target range  - Assess nutritional intake and initiate nutrition service referral as needed  Outcome: Progressing  Goal: Electrolytes maintained within normal limits  Description: INTERVENTIONS:  - Monitor labs and assess patient for signs and symptoms of electrolyte imbalances  - Administer electrolyte replacement as ordered  - Monitor response to electrolyte replacements, including repeat lab results as appropriate  - Instruct patient on fluid and nutrition as appropriate  Outcome: Progressing     Problem: MUSCULOSKELETAL - ADULT  Goal: Maintain or return mobility to safest level of function  Description: INTERVENTIONS:  - Assess patient's ability to carry out ADLs; assess patient's baseline for ADL function and identify physical deficits which impact ability to perform ADLs (bathing, care of mouth/teeth, toileting, grooming, dressing, etc )  - Assess/evaluate cause of self-care deficits   - Assess range of motion  - Assess patient's mobility  - Assess patient's need for assistive devices and provide as appropriate  - Encourage maximum independence but intervene and supervise when necessary  - Involve family in performance of ADLs  - Assess for home care needs following discharge   - Consider OT consult to assist with ADL evaluation and planning for discharge  - Provide patient education as appropriate  Outcome: Progressing  Goal: Maintain proper alignment of affected body part  Description: INTERVENTIONS:  - Support, maintain and protect limb and body alignment  - Provide patient/ family with appropriate education  Outcome: Progressing

## 2022-07-31 VITALS
BODY MASS INDEX: 29.87 KG/M2 | RESPIRATION RATE: 18 BRPM | DIASTOLIC BLOOD PRESSURE: 60 MMHG | SYSTOLIC BLOOD PRESSURE: 118 MMHG | HEIGHT: 73 IN | WEIGHT: 225.4 LBS | OXYGEN SATURATION: 98 % | HEART RATE: 61 BPM | TEMPERATURE: 98.4 F

## 2022-07-31 PROBLEM — R65.10 SIRS (SYSTEMIC INFLAMMATORY RESPONSE SYNDROME) (HCC): Status: RESOLVED | Noted: 2022-07-26 | Resolved: 2022-07-31

## 2022-07-31 LAB
BACTERIA BLD CULT: NORMAL
BACTERIA BLD CULT: NORMAL
GLUCOSE SERPL-MCNC: 198 MG/DL (ref 65–140)
GLUCOSE SERPL-MCNC: 210 MG/DL (ref 65–140)

## 2022-07-31 PROCEDURE — 82948 REAGENT STRIP/BLOOD GLUCOSE: CPT

## 2022-07-31 PROCEDURE — 99239 HOSP IP/OBS DSCHRG MGMT >30: CPT | Performed by: FAMILY MEDICINE

## 2022-07-31 RX ORDER — DOCUSATE SODIUM 100 MG/1
100 CAPSULE, LIQUID FILLED ORAL 2 TIMES DAILY
Qty: 60 CAPSULE | Refills: 0 | Status: SHIPPED | OUTPATIENT
Start: 2022-07-31

## 2022-07-31 RX ORDER — ACETAMINOPHEN 325 MG/1
975 TABLET ORAL EVERY 8 HOURS
Refills: 0
Start: 2022-07-31

## 2022-07-31 RX ORDER — PREDNISONE 10 MG/1
TABLET ORAL
Qty: 24 TABLET | Refills: 0 | Status: SHIPPED | OUTPATIENT
Start: 2022-08-01

## 2022-07-31 RX ORDER — METHOCARBAMOL 500 MG/1
500 TABLET, FILM COATED ORAL EVERY 12 HOURS PRN
Qty: 30 TABLET | Refills: 0 | Status: SHIPPED | OUTPATIENT
Start: 2022-07-31

## 2022-07-31 RX ORDER — OXYCODONE HYDROCHLORIDE 5 MG/1
5 TABLET ORAL EVERY 8 HOURS PRN
Qty: 10 TABLET | Refills: 0 | Status: SHIPPED | OUTPATIENT
Start: 2022-07-31

## 2022-07-31 RX ORDER — FAMOTIDINE 10 MG
10 TABLET ORAL DAILY
Qty: 20 TABLET | Refills: 0 | Status: SHIPPED | OUTPATIENT
Start: 2022-08-01

## 2022-07-31 RX ORDER — OXYCODONE HYDROCHLORIDE AND ACETAMINOPHEN 5; 325 MG/1; MG/1
1 TABLET ORAL EVERY 8 HOURS PRN
Qty: 10 TABLET | Refills: 0 | Status: SHIPPED | OUTPATIENT
Start: 2022-07-31 | End: 2022-07-31

## 2022-07-31 RX ADMIN — INSULIN LISPRO 2 UNITS: 100 INJECTION, SOLUTION INTRAVENOUS; SUBCUTANEOUS at 09:48

## 2022-07-31 RX ADMIN — OXYCODONE HYDROCHLORIDE AND ACETAMINOPHEN 1 TABLET: 5; 325 TABLET ORAL at 04:03

## 2022-07-31 RX ADMIN — ALLOPURINOL 100 MG: 100 TABLET ORAL at 09:46

## 2022-07-31 RX ADMIN — ATORVASTATIN CALCIUM 80 MG: 80 TABLET ORAL at 09:48

## 2022-07-31 RX ADMIN — HEPARIN SODIUM 5000 UNITS: 5000 INJECTION INTRAVENOUS; SUBCUTANEOUS at 09:46

## 2022-07-31 RX ADMIN — PREDNISONE 40 MG: 20 TABLET ORAL at 09:47

## 2022-07-31 RX ADMIN — NIFEDIPINE 60 MG: 30 TABLET, EXTENDED RELEASE ORAL at 09:47

## 2022-07-31 RX ADMIN — INSULIN LISPRO 2 UNITS: 100 INJECTION, SOLUTION INTRAVENOUS; SUBCUTANEOUS at 11:57

## 2022-07-31 RX ADMIN — CLOPIDOGREL BISULFATE 75 MG: 75 TABLET ORAL at 09:47

## 2022-07-31 RX ADMIN — FAMOTIDINE 10 MG: 20 TABLET ORAL at 09:47

## 2022-07-31 RX ADMIN — SITAGLIPTIN 25 MG: 25 TABLET, FILM COATED ORAL at 09:47

## 2022-07-31 RX ADMIN — CARVEDILOL 12.5 MG: 12.5 TABLET, FILM COATED ORAL at 09:46

## 2022-07-31 NOTE — NURSING NOTE
Discharge instructions given to patient, no further questions at this time   Patient left stable, all belongings at side

## 2022-07-31 NOTE — PLAN OF CARE
Problem: Potential for Falls  Goal: Patient will remain free of falls  Description: INTERVENTIONS:  - Educate patient/family on patient safety including physical limitations  - Instruct patient to call for assistance with activity   - Consult OT/PT to assist with strengthening/mobility   - Keep Call bell within reach  - Keep bed low and locked with side rails adjusted as appropriate  - Keep care items and personal belongings within reach  - Initiate and maintain comfort rounds  - Make Fall Risk Sign visible to staff  - Offer Toileting every 2 Hours, in advance of need  - Initiate/Maintain bed alarm  - Obtain necessary fall risk management equipment: yes  - Apply yellow socks and bracelet for high fall risk patients  - Consider moving patient to room near nurses station  Outcome: Progressing     Problem: PAIN - ADULT  Goal: Verbalizes/displays adequate comfort level or baseline comfort level  Description: Interventions:  - Encourage patient to monitor pain and request assistance  - Assess pain using appropriate pain scale  - Administer analgesics based on type and severity of pain and evaluate response  - Implement non-pharmacological measures as appropriate and evaluate response  - Consider cultural and social influences on pain and pain management  - Notify physician/advanced practitioner if interventions unsuccessful or patient reports new pain  Outcome: Progressing     Problem: INFECTION - ADULT  Goal: Absence or prevention of progression during hospitalization  Description: INTERVENTIONS:  - Assess and monitor for signs and symptoms of infection  - Monitor lab/diagnostic results  - Monitor all insertion sites, i e  indwelling lines, tubes, and drains  - Monitor endotracheal if appropriate and nasal secretions for changes in amount and color  - [place] appropriate cooling/warming therapies per order  - Administer medications as ordered  - Instruct and encourage patient and family to use good hand hygiene technique  - Identify and instruct in appropriate isolation precautions for identified infection/condition  Outcome: Progressing  Goal: Absence of fever/infection during neutropenic period  Description: INTERVENTIONS:  - Monitor WBC    Outcome: Progressing     Problem: SAFETY ADULT  Goal: Patient will remain free of falls  Description: INTERVENTIONS:  - Educate patient/family on patient safety including physical limitations  - Instruct patient to call for assistance with activity   - Consult OT/PT to assist with strengthening/mobility   - Keep Call bell within reach  - Keep bed low and locked with side rails adjusted as appropriate  - Keep care items and personal belongings within reach  - Initiate and maintain comfort rounds  - Make Fall Risk Sign visible to staff  - Offer Toileting every 2 Hours, in advance of need  - Initiate/Maintain bed alarm  - Obtain necessary fall risk management equipment: yes  - Apply yellow socks and bracelet for high fall risk patients  - Consider moving patient to room near nurses station  Outcome: Progressing  Goal: Maintain or return to baseline ADL function  Description: INTERVENTIONS:  -  Assess patient's ability to carry out ADLs; assess patient's baseline for ADL function and identify physical deficits which impact ability to perform ADLs (bathing, care of mouth/teeth, toileting, grooming, dressing, etc )  - Assess/evaluate cause of self-care deficits   - Assess range of motion  - Assess patient's mobility; develop plan if impaired  - Assess patient's need for assistive devices and provide as appropriate  - Encourage maximum independence but intervene and supervise when necessary  - Involve family in performance of ADLs  - Assess for home care needs following discharge   - Consider OT consult to assist with ADL evaluation and planning for discharge  - Provide patient education as appropriate  Outcome: Progressing  Goal: Maintains/Returns to pre admission functional level  Description: INTERVENTIONS:  - Perform BMAT or MOVE assessment daily    - Set and communicate daily mobility goal to care team and patient/family/caregiver  - Collaborate with rehabilitation services on mobility goals if consulted  - Perform Range of Motion 3 times a day  - Reposition patient every 2 hours  - Dangle patient 3 times a day  - Stand patient 3 times a day  - Ambulate patient 3 times a day  - Out of bed to chair 3 times a day   - Out of bed for meals 3 times a day  - Out of bed for toileting  - Record patient progress and toleration of activity level   Outcome: Progressing     Problem: DISCHARGE PLANNING  Goal: Discharge to home or other facility with appropriate resources  Description: INTERVENTIONS:  - Identify barriers to discharge w/patient and caregiver  - Arrange for needed discharge resources and transportation as appropriate  - Identify discharge learning needs (meds, wound care, etc )  - Arrange for interpretive services to assist at discharge as needed  - Refer to Case Management Department for coordinating discharge planning if the patient needs post-hospital services based on physician/advanced practitioner order or complex needs related to functional status, cognitive ability, or social support system  Outcome: Progressing     Problem: Knowledge Deficit  Goal: Patient/family/caregiver demonstrates understanding of disease process, treatment plan, medications, and discharge instructions  Description: Complete learning assessment and assess knowledge base    Interventions:  - Provide teaching at level of understanding  - Provide teaching via preferred learning methods  Outcome: Progressing     Problem: METABOLIC, FLUID AND ELECTROLYTES - ADULT  Goal: Fluid balance maintained  Description: INTERVENTIONS:  - Monitor labs   - Monitor I/O and WT  - Instruct patient on fluid and nutrition as appropriate  - Assess for signs & symptoms of volume excess or deficit  Outcome: Progressing  Goal: Glucose maintained within target range  Description: INTERVENTIONS:  - Monitor Blood Glucose as ordered  - Assess for signs and symptoms of hyperglycemia and hypoglycemia  - Administer ordered medications to maintain glucose within target range  - Assess nutritional intake and initiate nutrition service referral as needed  Outcome: Progressing  Goal: Electrolytes maintained within normal limits  Description: INTERVENTIONS:  - Monitor labs and assess patient for signs and symptoms of electrolyte imbalances  - Administer electrolyte replacement as ordered  - Monitor response to electrolyte replacements, including repeat lab results as appropriate  - Instruct patient on fluid and nutrition as appropriate  Outcome: Progressing     Problem: MUSCULOSKELETAL - ADULT  Goal: Maintain or return mobility to safest level of function  Description: INTERVENTIONS:  - Assess patient's ability to carry out ADLs; assess patient's baseline for ADL function and identify physical deficits which impact ability to perform ADLs (bathing, care of mouth/teeth, toileting, grooming, dressing, etc )  - Assess/evaluate cause of self-care deficits   - Assess range of motion  - Assess patient's mobility  - Assess patient's need for assistive devices and provide as appropriate  - Encourage maximum independence but intervene and supervise when necessary  - Involve family in performance of ADLs  - Assess for home care needs following discharge   - Consider OT consult to assist with ADL evaluation and planning for discharge  - Provide patient education as appropriate  Outcome: Progressing  Goal: Maintain proper alignment of affected body part  Description: INTERVENTIONS:  - Support, maintain and protect limb and body alignment  - Provide patient/ family with appropriate education  Outcome: Progressing

## 2022-07-31 NOTE — TREATMENT PLAN
Chart reviewed, last HD yesterday  Weight was down to 102 5 kg which is below target weight, next HD on Tuesday  Blood pressure improving with nifedipine    Patient will be seen tomorrow, please let Nephrology know if any issues

## 2022-07-31 NOTE — PLAN OF CARE
Problem: Potential for Falls  Goal: Patient will remain free of falls  Description: INTERVENTIONS:  - Educate patient/family on patient safety including physical limitations  - Instruct patient to call for assistance with activity   - Consult OT/PT to assist with strengthening/mobility   - Keep Call bell within reach  - Keep bed low and locked with side rails adjusted as appropriate  - Keep care items and personal belongings within reach  - Initiate and maintain comfort rounds  - Make Fall Risk Sign visible to staff  - Offer Toileting every 2 Hours, in advance of need  - Initiate/Maintain bed alarm  - Obtain necessary fall risk management equipment: yes  - Apply yellow socks and bracelet for high fall risk patients  - Consider moving patient to room near nurses station  Outcome: Progressing     Problem: PAIN - ADULT  Goal: Verbalizes/displays adequate comfort level or baseline comfort level  Description: Interventions:  - Encourage patient to monitor pain and request assistance  - Assess pain using appropriate pain scale  - Administer analgesics based on type and severity of pain and evaluate response  - Implement non-pharmacological measures as appropriate and evaluate response  - Consider cultural and social influences on pain and pain management  - Notify physician/advanced practitioner if interventions unsuccessful or patient reports new pain  Outcome: Progressing     Problem: INFECTION - ADULT  Goal: Absence or prevention of progression during hospitalization  Description: INTERVENTIONS:  - Assess and monitor for signs and symptoms of infection  - Monitor lab/diagnostic results  - Monitor all insertion sites, i e  indwelling lines, tubes, and drains  - Monitor endotracheal if appropriate and nasal secretions for changes in amount and color  - [place] appropriate cooling/warming therapies per order  - Administer medications as ordered  - Instruct and encourage patient and family to use good hand hygiene technique  - Identify and instruct in appropriate isolation precautions for identified infection/condition  Outcome: Progressing  Goal: Absence of fever/infection during neutropenic period  Description: INTERVENTIONS:  - Monitor WBC    Outcome: Progressing     Problem: SAFETY ADULT  Goal: Patient will remain free of falls  Description: INTERVENTIONS:  - Educate patient/family on patient safety including physical limitations  - Instruct patient to call for assistance with activity   - Consult OT/PT to assist with strengthening/mobility   - Keep Call bell within reach  - Keep bed low and locked with side rails adjusted as appropriate  - Keep care items and personal belongings within reach  - Initiate and maintain comfort rounds  - Make Fall Risk Sign visible to staff  - Offer Toileting every 2 Hours, in advance of need  - Initiate/Maintain bed alarm  - Obtain necessary fall risk management equipment: yes  - Apply yellow socks and bracelet for high fall risk patients  - Consider moving patient to room near nurses station  Outcome: Progressing  Goal: Maintain or return to baseline ADL function  Description: INTERVENTIONS:  -  Assess patient's ability to carry out ADLs; assess patient's baseline for ADL function and identify physical deficits which impact ability to perform ADLs (bathing, care of mouth/teeth, toileting, grooming, dressing, etc )  - Assess/evaluate cause of self-care deficits   - Assess range of motion  - Assess patient's mobility; develop plan if impaired  - Assess patient's need for assistive devices and provide as appropriate  - Encourage maximum independence but intervene and supervise when necessary  - Involve family in performance of ADLs  - Assess for home care needs following discharge   - Consider OT consult to assist with ADL evaluation and planning for discharge  - Provide patient education as appropriate  Outcome: Progressing  Goal: Maintains/Returns to pre admission functional level  Description: INTERVENTIONS:  - Perform BMAT or MOVE assessment daily    - Set and communicate daily mobility goal to care team and patient/family/caregiver  - Collaborate with rehabilitation services on mobility goals if consulted  - Perform Range of Motion 3 times a day  - Reposition patient every 2 hours  - Dangle patient 3 times a day  - Stand patient 3 times a day  - Ambulate patient 3 times a day  - Out of bed to chair 3 times a day   - Out of bed for meals 3 times a day  - Out of bed for toileting  - Record patient progress and toleration of activity level   Outcome: Progressing     Problem: DISCHARGE PLANNING  Goal: Discharge to home or other facility with appropriate resources  Description: INTERVENTIONS:  - Identify barriers to discharge w/patient and caregiver  - Arrange for needed discharge resources and transportation as appropriate  - Identify discharge learning needs (meds, wound care, etc )  - Arrange for interpretive services to assist at discharge as needed  - Refer to Case Management Department for coordinating discharge planning if the patient needs post-hospital services based on physician/advanced practitioner order or complex needs related to functional status, cognitive ability, or social support system  Outcome: Progressing     Problem: Knowledge Deficit  Goal: Patient/family/caregiver demonstrates understanding of disease process, treatment plan, medications, and discharge instructions  Description: Complete learning assessment and assess knowledge base    Interventions:  - Provide teaching at level of understanding  - Provide teaching via preferred learning methods  Outcome: Progressing     Problem: METABOLIC, FLUID AND ELECTROLYTES - ADULT  Goal: Fluid balance maintained  Description: INTERVENTIONS:  - Monitor labs   - Monitor I/O and WT  - Instruct patient on fluid and nutrition as appropriate  - Assess for signs & symptoms of volume excess or deficit  Outcome: Progressing  Goal: Glucose maintained within target range  Description: INTERVENTIONS:  - Monitor Blood Glucose as ordered  - Assess for signs and symptoms of hyperglycemia and hypoglycemia  - Administer ordered medications to maintain glucose within target range  - Assess nutritional intake and initiate nutrition service referral as needed  Outcome: Progressing  Goal: Electrolytes maintained within normal limits  Description: INTERVENTIONS:  - Monitor labs and assess patient for signs and symptoms of electrolyte imbalances  - Administer electrolyte replacement as ordered  - Monitor response to electrolyte replacements, including repeat lab results as appropriate  - Instruct patient on fluid and nutrition as appropriate  Outcome: Progressing     Problem: MUSCULOSKELETAL - ADULT  Goal: Maintain or return mobility to safest level of function  Description: INTERVENTIONS:  - Assess patient's ability to carry out ADLs; assess patient's baseline for ADL function and identify physical deficits which impact ability to perform ADLs (bathing, care of mouth/teeth, toileting, grooming, dressing, etc )  - Assess/evaluate cause of self-care deficits   - Assess range of motion  - Assess patient's mobility  - Assess patient's need for assistive devices and provide as appropriate  - Encourage maximum independence but intervene and supervise when necessary  - Involve family in performance of ADLs  - Assess for home care needs following discharge   - Consider OT consult to assist with ADL evaluation and planning for discharge  - Provide patient education as appropriate  Outcome: Progressing  Goal: Maintain proper alignment of affected body part  Description: INTERVENTIONS:  - Support, maintain and protect limb and body alignment  - Provide patient/ family with appropriate education  Outcome: Progressing

## 2022-07-31 NOTE — DISCHARGE SUMMARY
Discharge Summary - [facility]    Patient Information: Sherrill Torres 60 y o  male MRN: [id number]  Unit/Bed#: [address] [place] [address] Encounter: [id number]    Discharging Physician / Practitioner: Essie Serrano DO  PCP: Jody Sosa MD  Admission Date: 7/26/2022  Discharge Date: 07/31/22    Reason for Admission: Testicle Pain and Shoulder Pain      Discharge Diagnoses:     Principal Problem:    Osteoarthritis of left hip  Active Problems:    ESRD (end stage renal disease) ([facility] )    Severe Left Orchalgia    Type 2 diabetes mellitus, without long-term current use of insulin ([facility] )    Right shoulder pain    Hypertension  Resolved Problems:    SIRS (systemic inflammatory response syndrome) ([place] [address] )        * Osteoarthritis of left hip  Assessment & Plan  Left hip osteoarthritis as noted on imaging  Left hip pain could be the cause of his left testicular pain  · Follow-up with orthopedics after discharge  · CRP and ESR elevated  · Hip aspiration attempted on 07/29, yielded 0 mL fluid  No culture was collected  · Continue prednisone which will be tapered down on discharge  · Patient to follow-up with PCP after discharge  Patient currently not interested in outpatient pain management    Severe Left Orchalgia  Assessment & Plan  Presented with left testicular pain which has been present for several weeks  Saw an outpatient provider who prescribed cefepime after dialysis for presumed epididymitis  Stated this pain felt similar to his prior history of epididymitis    · No signs of epididymitis on imaging  · Prior provider Spoke with Infectious Disease, possible referred pain from severe osteoarthritis of the left hip  · Prior provider Spoke with Urology, could possibly be related to osteoarthritis causing neuropathy with referred pain to the left testicle or inflamed left epididymal cyst due to trauma  · Recommended tight-fitting underwear for few days  · No indication for antibiotics for this    ESRD (end stage renal disease) [facility])  Assessment & Plan  Lab Results   Component Value Date    EGFR 9 07/30/2022    EGFR 14 07/29/2022    EGFR 10 07/28/2022    CREATININE 5 57 (H) 07/30/2022    CREATININE 4 17 (H) 07/29/2022    CREATININE 5 17 (H) 07/28/2022     Patient follows with [facility] outpatient; Tzxaahp-Oszclygp-Vvgybmjl via right anterior chest wall PermCath  · Received dialysis per Nephrology while here  · Patient advised to follow-up after discharge for his next scheduled dialysis    Right shoulder pain  Assessment & Plan  Presented with right shoulder pain which had been present for the past week  Unable to drive his car  Missed dialysis session due to severe right shoulder pain and testicular pain  · No osseous abnormality noted on right shoulder x-ray  · Continue prednisone taper on discharge, Pain improving    Type 2 diabetes mellitus, without long-term current use of insulin [facility])  Assessment & Plan  Lab Results   Component Value Date    HGBA1C 6 6 (H) 07/07/2022       Recent Labs     07/30/22  1607 07/30/22  2044 07/31/22  0720 07/31/22  1128   POCGLU 188* 366* 210* 198*     · He was not taking his Linagliptin as he ran out of it   · Rx provided on discharge  · Now with steroid induced hyperglycemia although blood sugars have improved today  · Patient made aware of sugars running high at home due to steroids however this should improve as prednisone is tapered off    SIRS (systemic inflammatory response syndrome) (HCC)-resolved as of 7/31/2022  Assessment & Plan  As evidenced by leukocytosis and fever, likely reactive to severe osteoarthritis  · No further fevers since starting prednisone  · No signs of infection thus far  · Blood cultures negative  · Patient with persistent leukocytosis which is likely steroid mediated    Hypertension  Assessment & Plan  Continue Coreg    Nifedipine was restarted as blood pressures was running high      Consultations During Hospital Stay:  [facility]  IP CONSULT TO PHARMACY  IP CONSULT TO NEPHROLOGY  IP CONSULT TO ORTHOPEDIC SURGERY  IP CONSULT TO UROLOGY    Procedures Performed:     · none    Significant Findings:     · Refer to hospital course and above listed diagnosis related plan for details    Imaging while in hospital:    CT abdomen pelvis wo contrast    Result Date: 7/26/2022  Narrative: CT ABDOMEN AND PELVIS WITHOUT IV CONTRAST INDICATION:   LLQ abdominal pain groin pain  COMPARISON:  7/6/2022 TECHNIQUE:  CT examination of the abdomen and pelvis was performed without intravenous contrast  Axial, sagittal, and coronal 2D reformatted images were created from the source data and submitted for interpretation  Radiation dose length product (DLP) for this visit:  724 mGy-cm   This examination, like all CT scans performed in the [facility], was performed utilizing techniques to minimize radiation dose exposure, including the use of iterative reconstruction and automated exposure control  Enteric contrast was administered  FINDINGS: ABDOMEN LOWER CHEST:  No clinically significant abnormality identified in the visualized lower chest  LIVER/BILIARY TREE:  Unremarkable  GALLBLADDER:  No calcified gallstones  No pericholecystic inflammatory change  SPLEEN:  Unremarkable  PANCREAS:  Unremarkable  ADRENAL GLANDS:  Unremarkable  KIDNEYS/URETERS:  No hydronephrosis or urinary tract calculus  One or more sharply circumscribed subcentimeter renal hypodensities are present, too small to accurately characterize, and statistically most likely benign findings  According to recent literature (Radiology 2019) no further workup of these findings is recommended  STOMACH AND BOWEL:  There is colonic diverticulosis without evidence of acute diverticulitis  APPENDIX:  A normal appendix was visualized  ABDOMINOPELVIC CAVITY:  No ascites  No pneumoperitoneum  No lymphadenopathy  VESSELS:  Age-appropriate atherosclerotic disease noted    There are a few mildly prominent para-aortic lymph nodes identified similar to prior exam   Fatty yocasta noted throughout  PELVIS REPRODUCTIVE ORGANS:  The prostate is enlarged  URINARY BLADDER:  Unremarkable  ABDOMINAL WALL/INGUINAL REGIONS:  Mildly prominent left inguinal nodes demonstrated similar to prior study  OSSEOUS STRUCTURES:  Stable T10 vertebral compression fracture     Impression: 1  No acute inflammatory or infectious process  LV prominent left inguinal lymph nodes are identified similar to prior examination without worrisome features  Similar and also stable para-aortic lymph nodes are identified  2   Prostatomegaly  3   Renal cysts, stable  4   Stable T10 vertebral compression fracture  Workstation performed: [id number]     CT abdomen pelvis wo contrast    Result Date: 7/6/2022  Narrative: CT ABDOMEN AND PELVIS WITHOUT IV CONTRAST INDICATION:   LLQ abdominal pain left inguinal pain  COMPARISON:  1/23/2022  TECHNIQUE:  CT examination of the abdomen and pelvis was performed without intravenous contrast  This examination was performed without intravenous contrast in the context of the critical nationwide Omnipaque shortage  Axial, sagittal, and coronal 2D reformatted images were created from the source data and submitted for interpretation  Radiation dose length product (DLP) for this visit:  994 39 mGy-cm   This examination, like all CT scans performed in the [facility], was performed utilizing techniques to minimize radiation dose exposure, including the use of iterative  reconstruction and automated exposure control  Enteric contrast was not administered  FINDINGS: ABDOMEN LOWER CHEST:  No clinically significant abnormality identified in the visualized lower chest  LIVER/BILIARY TREE:  Unremarkable  GALLBLADDER:  No calcified gallstones  No pericholecystic inflammatory change  SPLEEN:  Unremarkable  PANCREAS:  Unremarkable  ADRENAL GLANDS:  Unremarkable   KIDNEYS/URETERS:  There are small probable bilateral renal cysts some of which appear hyperdense as before  These can be further evaluated with ultrasound  No CT evidence of nephrolithiasis or hydronephrosis  No hydroureter  STOMACH AND BOWEL:  There is no bowel obstruction  There is mild colonic diverticulosis without acute diverticulitis  No focal inflammatory process  APPENDIX:  A normal appendix was visualized  ABDOMINOPELVIC CAVITY:  No ascites  No pneumoperitoneum  No lymphadenopathy  Small fat-containing retroperitoneal densities possibly small fat-containing lymph nodes are stable and unchanged  Stable small bilateral groin lymph nodes  VESSELS:  The abdominal is calcified but normal in caliber  No retroperitoneal hematoma  PELVIS REPRODUCTIVE ORGANS:  The prostate is enlarged  URINARY BLADDER:  Minimal bladder wall thickening  Underlying cystitis not excluded  ABDOMINAL WALL/INGUINAL REGIONS:  Unremarkable  OSSEOUS STRUCTURES:  Healing fracture of the T10 vertebral body noted  There is moderate to advanced left hip and moderate right hip osteoarthrosis  Impression: No bowel obstruction  Colonic diverticulosis without acute diverticulitis  Healing T10 vertebral body fracture  Minimal bladder wall thickening  Underlying cystitis not excluded  Additional findings as above  Workstation performed: [id number]     XR shoulder 2+ vw right    Result Date: 7/26/2022  Narrative: RIGHT SHOULDER INDICATION:   right shoulder pain  COMPARISON:  None VIEWS:  XR SHOULDER 2+ VW RIGHT FINDINGS: There is no acute fracture or dislocation  Osteoarthritis moderate at the acromioclavicular joint and mild the glenohumeral joint  No lytic or blastic osseous lesion  Soft tissues are unremarkable  Central venous catheter noted  Impression: No acute osseous abnormality  Degenerative changes as described   Workstation performed: [id number]     XR hip/pelv 2-3 vws left if performed    Result Date: 7/11/2022  Narrative: LEFT HIP INDICATION:   Increasing left hip pain  No reported trauma  COMPARISON:  None VIEWS:  XR HIP/PELV 2-3 VWS LEFT  W PELVIS IF PERFORMED FINDINGS: Bones are intact  Preserved alignment  No suspicious lytic or blastic bone lesion  Left greater than right hip osteoarthritis  Spine degenerative change  Soft tissue vascular calcification  Impression: No acute osseous abnormality  Degenerative changes  Workstation performed: [id number]     US scrotum and testicles    Result Date: 7/26/2022  Narrative: SCROTAL ULTRASOUND INDICATION:    left testicular pain  COMPARISON: None TECHNIQUE:   Ultrasound the scrotal contents was performed with a high frequency linear transducer utilizing volumetric sweep imaging as well as standard still image techniques  Imaging performed in longitudinal and transverse orientation  Color and spectral Doppler evaluation also performed bilaterally  FINDINGS: TESTES: Testes are symmetric and normal in size  RIGHT testis = 4 2 x 2 3 x 3 1 cm  Volume 15 4 mL Normal contour with homogeneous smooth echotexture  No intratesticular mass lesion or calcifications  LEFT testis = 4 1 x 2 3 x 2 6 cm  Volume 12 9 mL Normal contour with homogeneous smooth echotexture  No intratesticular mass lesion or calcifications  Doppler flow within both testes is present and symmetric  EPIDIDYMIDES: Normal Size  Doppler ultrasound demonstrates normal blood flow  There are small epididymal cyst(s) bilaterally  Otherwise unremarkable  HYDROCELE:  No significant fluid present  VARICOCELE:  None present  SCROTUM:  Scrotal thickness and appearance within normal limits  No evidence for extratesticular mass or hernia demonstrated  Impression:  No acute abnormality  Workstation performed: [id number]     VAS lower limb venous duplex study, complete bilateral    Result Date: 7/8/2022  Narrative:  [facility] REPORT CLINICAL: Indications: Patient presents with left lower extremity pain     CONCLUSION: Impression: RIGHT LOWER LIMB: No evidence of acute or chronic deep vein thrombosis  No evidence of superficial thrombophlebitis noted  Doppler evaluation shows a normal response to augmentation maneuvers  Popliteal, posterior tibial and anterior tibial arterial Doppler waveforms are triphasic  LEFT LOWER LIMB: No evidence of acute or chronic deep vein thrombosis  No evidence of superficial thrombophlebitis noted  Doppler evaluation shows a normal response to augmentation maneuvers  Popliteal, posterior tibial and anterior tibial arterial Doppler waveforms are triphasic  Tech Note: There is an echogenic structure located in the inguinal region  This structure measures approximately 1 75cm x 3 cm and is consistent with enlarged lymph node and channels  Technically difficult/limited study secondary to inability to reposition  SIGNATURE: Electronically Signed by: Ike Ellison MD, RPVI on 2022-07-08 09:53:58 AM    FL guided fine needle aspiration (all inc)    Result Date: 7/29/2022  Narrative: LEFT HIP INJECTION INDICATION:   left hip aspiration, rule out infection  COMPARISON:  CT scan from 7/26/2022 IMAGES:  1 FLUOROSCOPY TIME:   42 SEC FINDINGS: After the risks and benefits of the procedure were thoroughly explained, informed consent was obtained  The patient verbalized expressed understanding of the above risks and wished to proceed with the procedure  Final standard "time-out" procedure performed  The patient was prepped and draped in the usual sterile fashion  1% lidocaine solution was utilized for local anesthesia  Intermittent fluoroscopy was utilized for placement a 20 gauge 3 5 inch spinal needle within the left hip joint  Multiple attempts  at aspiration and repositioning yielded no fluid  1 mL of Omnipaque 240 were injected into the left hip joint confirming positioning  The patient tolerated the procedure well  There were no complications  The patient left the department in good condition       Impression: Fluoroscopic guided left hip joint aspiration yielded no fluid  Workstation performed: [id number]     CT lower extremity wo contrast left    Result Date: 7/11/2022  Narrative: CT left hip and femur  without IV contrast INDICATION: pain  Per ED notes, presented with worsening left hip/inguinal pain, severe left hip osteoarthritis per patient  No reported trauma  COMPARISON: 7/6/2022 abdominopelvic CT TECHNIQUE: CT examination of the above was performed  This examination was performed without intravenous contrast in the context of the critical nationwide Omnipaque shortage  This examination, like all CT scans performed in the [facility], was performed utilizing techniques to minimize radiation dose exposure, including the use of iterative reconstruction and automated exposure control software  Sagittal and coronal two dimensional reconstructed images were also submitted for interpretation  Rad dose  965 mGy-cm FINDINGS: OSSEOUS STRUCTURES:  Intact  Preserved alignment  Moderate to marked left hip and knee osteophytes  Superior left hip joint space narrowing  Small knee suprapatella effusion  No suspicious bone lesions  VISUALIZED MUSCULATURE:  No acute findings  Mild muscle fatty infiltration, predominantly gluteus, tensor fasciae latae and hamstrings, particularly semimembranosus  SOFT TISSUES:  Vascular calcification  No acute findings  OTHER PERTINENT FINDINGS:  None  Impression: No acute findings  Left hip and knee osteoarthritis  Workstation performed: [id number]       Incidental Findings:   · Prominent Left inguinal lymph nodes  · Stable periaortic lymph nodes   · Enlarged prostate  · Renal cysts  · Stable T10 vertebral compression fracture    Test Results Pending at Discharge (will require follow up):   · As per After Visit Summary     Outpatient Tests Requested:  · none    Complications:  Refer to hospital course and above listed diagnosis related plan, if any    Hospital Course:     Michael Soto is a 60 y o  male patient who originally presented to the hospital on 7/26/2022 due to Right shoulder pain and also pain along the left groin/testicular area  Patient reported worsening pain prompting him to come to the ER  He was seen by a doctor in [place] area and prescribed IV cefepime with his dialysis treatments for possible epididymitis  Patient was admitted and seen by Orthopedic surgery, Urology and Nephrology while here  Received dialysis per Nephrology  Infectious workup was done which was negative  Patient treated with prednisone with significant improvement in symptoms  Cleared by all consultants involved in his care prior to discharge  Discharge plan discussed in details with patient  Please see above list of diagnoses and related plan for additional information  Condition at Discharge: stable     Discharge Day Visit / Exam:     Subjective:  Feels well  Requesting ride home  States he will follow up this primary care doctor after discharge    Vitals: Blood Pressure: 118/60 (07/31/22 1139)  Pulse: 61 (07/31/22 1139)  Temperature: 98 4 °F (36 9 °C) (07/31/22 1139)  Temp Source: Temporal (07/31/22 1139)  Respirations: 18 (07/31/22 1139)  Height: 6' 1" (185 4 cm) (07/30/22 2156)  Weight - Scale: 102 kg (225 lb 6 4 oz) (07/31/22 0600)  SpO2: 98 % (07/31/22 1139)  Exam:   Physical Exam  Constitutional:       General: He is not in acute distress  Appearance: He is not diaphoretic  HENT:      Head: Normocephalic and atraumatic  Eyes:      General:         Right eye: No discharge  Left eye: No discharge  Cardiovascular:      Rate and Rhythm: Normal rate and regular rhythm  Pulmonary:      Effort: Pulmonary effort is normal  No respiratory distress  Breath sounds: Normal breath sounds  No wheezing or rales  Abdominal:      General: Bowel sounds are normal  There is no distension  Palpations: Abdomen is soft  Tenderness: There is no abdominal tenderness  Musculoskeletal:      Comments: Good range of motion of right shoulder and left hip area   Neurological:      Mental Status: He is alert and oriented to person, place, and time  Discharge instructions/Information to patient and family:(Discharge Medications and Follow up):   See after visit summary for information provided to patient and family  Provisions for Follow-Up Care:  See after visit summary for information related to follow-up care and any pertinent home health orders  Disposition: Home    Planned Readmission:  No     Discharge Statement:  I spent > 30 minutes discharging the patient  This time was spent on the day of discharge  I had direct contact with the patient on the day of discharge  Greater than 50% of the total time was spent examining patient, answering all patient questions, arranging and discussing plan of care with patient as well as directly providing post-discharge instructions  Additional time then spent on discharge activities  Discharge Medications:  See after visit summary for reconciled discharge medications provided to patient and family  ** Please Note: "This note has been constructed using a voice recognition system  Therefore there may be syntax, spelling, and/or grammatical errors   Please call if you have any questions  "**

## 2022-07-31 NOTE — QUICK NOTE
Patient blood pressure remains elevated  He takes nifedipine ER 30mg bid at home but is not on formulary here  Discussed with pharmacy, will switch to nifedipine xl 60mg daily while hospitalized

## 2022-07-31 NOTE — DISCHARGE INSTRUCTIONS
You were noted to have prominent left inguinal lymph nodes with no worrisome features  Stable para-aortic lymph nodes noted along with enlarged prostate  Noted to have renal cysts along with T10 vertebral compression fracture    Although these findings have been seen before and are stable    Keep your next scheduled appointment for dialysis    Speak with your primary care doctor about seeing a pain management doctor

## 2023-08-19 ENCOUNTER — HOSPITAL ENCOUNTER (INPATIENT)
Facility: HOSPITAL | Age: 63
LOS: 18 days | Discharge: NURSING, REHAB OR LONG-TERM CARE | DRG: 474 | End: 2023-09-06
Attending: EMERGENCY MEDICINE | Admitting: STUDENT IN AN ORGANIZED HEALTH CARE EDUCATION/TRAINING PROGRAM
Payer: MEDICARE

## 2023-08-19 ENCOUNTER — APPOINTMENT (EMERGENCY)
Dept: RADIOLOGY | Facility: HOSPITAL | Age: 63
DRG: 474 | End: 2023-08-19
Payer: MEDICARE

## 2023-08-19 ENCOUNTER — APPOINTMENT (INPATIENT)
Dept: DIALYSIS | Facility: HOSPITAL | Age: 63
DRG: 474 | End: 2023-08-19
Payer: MEDICARE

## 2023-08-19 DIAGNOSIS — M00.9 SEPTIC ARTHRITIS OF RIGHT FOOT, DUE TO UNSPECIFIED ORGANISM (HCC): ICD-10-CM

## 2023-08-19 DIAGNOSIS — K59.00 CONSTIPATION: ICD-10-CM

## 2023-08-19 DIAGNOSIS — R78.81 BACTEREMIA: ICD-10-CM

## 2023-08-19 DIAGNOSIS — R74.8 ELEVATED CK: ICD-10-CM

## 2023-08-19 DIAGNOSIS — R41.82 AMS (ALTERED MENTAL STATUS): ICD-10-CM

## 2023-08-19 DIAGNOSIS — R00.1 BRADYCARDIA: ICD-10-CM

## 2023-08-19 DIAGNOSIS — A49.9 POLYMICROBIAL BACTERIAL INFECTION: ICD-10-CM

## 2023-08-19 DIAGNOSIS — M86.271 SUBACUTE OSTEOMYELITIS OF RIGHT FOOT (HCC): ICD-10-CM

## 2023-08-19 DIAGNOSIS — M54.50 LOW BACK PAIN: ICD-10-CM

## 2023-08-19 DIAGNOSIS — E83.42 HYPOMAGNESEMIA: ICD-10-CM

## 2023-08-19 DIAGNOSIS — L97.416 DIABETIC ULCER OF RIGHT MIDFOOT ASSOCIATED WITH TYPE 2 DIABETES MELLITUS, WITH BONE INVOLVEMENT WITHOUT EVIDENCE OF NECROSIS (HCC): ICD-10-CM

## 2023-08-19 DIAGNOSIS — A41.9 SEPSIS (HCC): ICD-10-CM

## 2023-08-19 DIAGNOSIS — L03.115 CELLULITIS OF RIGHT FOOT: ICD-10-CM

## 2023-08-19 DIAGNOSIS — E83.39 HYPOPHOSPHATEMIA: ICD-10-CM

## 2023-08-19 DIAGNOSIS — N18.6 ESRD (END STAGE RENAL DISEASE) (HCC): Primary | ICD-10-CM

## 2023-08-19 DIAGNOSIS — R41.82 ALTERED MENTAL STATUS: ICD-10-CM

## 2023-08-19 DIAGNOSIS — E11.621 DIABETIC ULCER OF RIGHT MIDFOOT ASSOCIATED WITH TYPE 2 DIABETES MELLITUS, WITH BONE INVOLVEMENT WITHOUT EVIDENCE OF NECROSIS (HCC): ICD-10-CM

## 2023-08-19 DIAGNOSIS — S91.301A OPEN WOUND OF PLANTAR ASPECT OF RIGHT FOOT: ICD-10-CM

## 2023-08-19 DIAGNOSIS — E11.00 TYPE 2 DIABETES MELLITUS WITH HYPEROSMOLARITY WITHOUT COMA, WITHOUT LONG-TERM CURRENT USE OF INSULIN (HCC): ICD-10-CM

## 2023-08-19 DIAGNOSIS — I73.9 PAD (PERIPHERAL ARTERY DISEASE) (HCC): ICD-10-CM

## 2023-08-19 PROBLEM — Z91.81 STATUS POST FALL: Status: ACTIVE | Noted: 2023-08-19

## 2023-08-19 PROBLEM — E87.8 ELECTROLYTE ABNORMALITY: Status: ACTIVE | Noted: 2023-08-19

## 2023-08-19 PROBLEM — M62.82 RHABDOMYOLYSIS: Status: ACTIVE | Noted: 2023-08-19

## 2023-08-19 PROBLEM — D72.829 LEUKOCYTOSIS: Status: ACTIVE | Noted: 2023-08-19

## 2023-08-19 LAB
ALBUMIN SERPL BCP-MCNC: 3.7 G/DL (ref 3.5–5)
ALP SERPL-CCNC: 69 U/L (ref 34–104)
ALT SERPL W P-5'-P-CCNC: 14 U/L (ref 7–52)
ANION GAP SERPL CALCULATED.3IONS-SCNC: 8 MMOL/L
APTT PPP: 33 SECONDS (ref 23–37)
AST SERPL W P-5'-P-CCNC: 35 U/L (ref 13–39)
BASOPHILS # BLD AUTO: 0.04 THOUSANDS/ÂΜL (ref 0–0.1)
BASOPHILS NFR BLD AUTO: 0 % (ref 0–1)
BILIRUB SERPL-MCNC: 0.68 MG/DL (ref 0.2–1)
BUN SERPL-MCNC: 38 MG/DL (ref 5–25)
CALCIUM SERPL-MCNC: 10.2 MG/DL (ref 8.4–10.2)
CHLORIDE SERPL-SCNC: 94 MMOL/L (ref 96–108)
CK SERPL-CCNC: 1057 U/L (ref 39–308)
CO2 SERPL-SCNC: 32 MMOL/L (ref 21–32)
CREAT SERPL-MCNC: 7.48 MG/DL (ref 0.6–1.3)
EOSINOPHIL # BLD AUTO: 0.06 THOUSAND/ÂΜL (ref 0–0.61)
EOSINOPHIL NFR BLD AUTO: 0 % (ref 0–6)
ERYTHROCYTE [DISTWIDTH] IN BLOOD BY AUTOMATED COUNT: 15.9 % (ref 11.6–15.1)
GFR SERPL CREATININE-BSD FRML MDRD: 6 ML/MIN/1.73SQ M
GLUCOSE SERPL-MCNC: 107 MG/DL (ref 65–140)
GLUCOSE SERPL-MCNC: 136 MG/DL (ref 65–140)
GLUCOSE SERPL-MCNC: 157 MG/DL (ref 65–140)
HCT VFR BLD AUTO: 35.5 % (ref 36.5–49.3)
HGB BLD-MCNC: 11.4 G/DL (ref 12–17)
IMM GRANULOCYTES # BLD AUTO: 0.09 THOUSAND/UL (ref 0–0.2)
IMM GRANULOCYTES NFR BLD AUTO: 1 % (ref 0–2)
INR PPP: 1.16 (ref 0.84–1.19)
LYMPHOCYTES # BLD AUTO: 1.08 THOUSANDS/ÂΜL (ref 0.6–4.47)
LYMPHOCYTES NFR BLD AUTO: 8 % (ref 14–44)
MAGNESIUM SERPL-MCNC: 1.8 MG/DL (ref 1.9–2.7)
MCH RBC QN AUTO: 29.6 PG (ref 26.8–34.3)
MCHC RBC AUTO-ENTMCNC: 32.1 G/DL (ref 31.4–37.4)
MCV RBC AUTO: 92 FL (ref 82–98)
MONOCYTES # BLD AUTO: 1.53 THOUSAND/ÂΜL (ref 0.17–1.22)
MONOCYTES NFR BLD AUTO: 11 % (ref 4–12)
NEUTROPHILS # BLD AUTO: 11.21 THOUSANDS/ÂΜL (ref 1.85–7.62)
NEUTS SEG NFR BLD AUTO: 80 % (ref 43–75)
NRBC BLD AUTO-RTO: 0 /100 WBCS
PHOSPHATE SERPL-MCNC: 1.8 MG/DL (ref 2.3–4.1)
PLATELET # BLD AUTO: 213 THOUSANDS/UL (ref 149–390)
PMV BLD AUTO: 9.8 FL (ref 8.9–12.7)
POTASSIUM SERPL-SCNC: 4.1 MMOL/L (ref 3.5–5.3)
PROT SERPL-MCNC: 7.8 G/DL (ref 6.4–8.4)
PROTHROMBIN TIME: 14.9 SECONDS (ref 11.6–14.5)
RBC # BLD AUTO: 3.85 MILLION/UL (ref 3.88–5.62)
SODIUM SERPL-SCNC: 134 MMOL/L (ref 135–147)
WBC # BLD AUTO: 14.01 THOUSAND/UL (ref 4.31–10.16)

## 2023-08-19 PROCEDURE — 82948 REAGENT STRIP/BLOOD GLUCOSE: CPT

## 2023-08-19 PROCEDURE — 80053 COMPREHEN METABOLIC PANEL: CPT | Performed by: EMERGENCY MEDICINE

## 2023-08-19 PROCEDURE — 5A1D70Z PERFORMANCE OF URINARY FILTRATION, INTERMITTENT, LESS THAN 6 HOURS PER DAY: ICD-10-PCS | Performed by: INTERNAL MEDICINE

## 2023-08-19 PROCEDURE — 85610 PROTHROMBIN TIME: CPT | Performed by: EMERGENCY MEDICINE

## 2023-08-19 PROCEDURE — NC001 PR NO CHARGE: Performed by: INTERNAL MEDICINE

## 2023-08-19 PROCEDURE — 99285 EMERGENCY DEPT VISIT HI MDM: CPT | Performed by: EMERGENCY MEDICINE

## 2023-08-19 PROCEDURE — 99284 EMERGENCY DEPT VISIT MOD MDM: CPT

## 2023-08-19 PROCEDURE — 36415 COLL VENOUS BLD VENIPUNCTURE: CPT | Performed by: EMERGENCY MEDICINE

## 2023-08-19 PROCEDURE — 85025 COMPLETE CBC W/AUTO DIFF WBC: CPT | Performed by: EMERGENCY MEDICINE

## 2023-08-19 PROCEDURE — 99285 EMERGENCY DEPT VISIT HI MDM: CPT | Performed by: INTERNAL MEDICINE

## 2023-08-19 PROCEDURE — 83735 ASSAY OF MAGNESIUM: CPT | Performed by: EMERGENCY MEDICINE

## 2023-08-19 PROCEDURE — 85730 THROMBOPLASTIN TIME PARTIAL: CPT | Performed by: EMERGENCY MEDICINE

## 2023-08-19 PROCEDURE — 82550 ASSAY OF CK (CPK): CPT | Performed by: EMERGENCY MEDICINE

## 2023-08-19 PROCEDURE — 84100 ASSAY OF PHOSPHORUS: CPT | Performed by: EMERGENCY MEDICINE

## 2023-08-19 PROCEDURE — 87081 CULTURE SCREEN ONLY: CPT | Performed by: INTERNAL MEDICINE

## 2023-08-19 RX ORDER — ATORVASTATIN CALCIUM 80 MG/1
80 TABLET, FILM COATED ORAL DAILY
Status: DISCONTINUED | OUTPATIENT
Start: 2023-08-19 | End: 2023-08-31

## 2023-08-19 RX ORDER — METHOCARBAMOL 500 MG/1
500 TABLET, FILM COATED ORAL EVERY 12 HOURS PRN
Status: DISCONTINUED | OUTPATIENT
Start: 2023-08-19 | End: 2023-09-06 | Stop reason: HOSPADM

## 2023-08-19 RX ORDER — ACETAMINOPHEN 325 MG/1
975 TABLET ORAL EVERY 8 HOURS
Status: DISCONTINUED | OUTPATIENT
Start: 2023-08-19 | End: 2023-09-06 | Stop reason: HOSPADM

## 2023-08-19 RX ORDER — LANOLIN ALCOHOL/MO/W.PET/CERES
400 CREAM (GRAM) TOPICAL 2 TIMES DAILY
Status: DISCONTINUED | OUTPATIENT
Start: 2023-08-19 | End: 2023-08-23

## 2023-08-19 RX ORDER — NIFEDIPINE 30 MG/1
30 TABLET, EXTENDED RELEASE ORAL 2 TIMES DAILY
Status: DISCONTINUED | OUTPATIENT
Start: 2023-08-19 | End: 2023-09-06 | Stop reason: HOSPADM

## 2023-08-19 RX ORDER — ALLOPURINOL 100 MG/1
100 TABLET ORAL DAILY
Status: DISCONTINUED | OUTPATIENT
Start: 2023-08-19 | End: 2023-08-19 | Stop reason: DRUGHIGH

## 2023-08-19 RX ORDER — NIFEDIPINE 30 MG/1
30 TABLET, FILM COATED, EXTENDED RELEASE ORAL 2 TIMES DAILY
Status: DISCONTINUED | OUTPATIENT
Start: 2023-08-19 | End: 2023-08-19 | Stop reason: RX

## 2023-08-19 RX ORDER — CARVEDILOL 12.5 MG/1
12.5 TABLET ORAL 2 TIMES DAILY
Status: DISCONTINUED | OUTPATIENT
Start: 2023-08-19 | End: 2023-09-05

## 2023-08-19 RX ORDER — INSULIN LISPRO 100 [IU]/ML
1-5 INJECTION, SOLUTION INTRAVENOUS; SUBCUTANEOUS
Status: DISCONTINUED | OUTPATIENT
Start: 2023-08-19 | End: 2023-09-06 | Stop reason: HOSPADM

## 2023-08-19 RX ORDER — INSULIN LISPRO 100 [IU]/ML
2-12 INJECTION, SOLUTION INTRAVENOUS; SUBCUTANEOUS
Status: DISCONTINUED | OUTPATIENT
Start: 2023-08-19 | End: 2023-09-06 | Stop reason: HOSPADM

## 2023-08-19 RX ORDER — LIDOCAINE 50 MG/G
1 PATCH TOPICAL DAILY
Status: DISCONTINUED | OUTPATIENT
Start: 2023-08-19 | End: 2023-09-06 | Stop reason: HOSPADM

## 2023-08-19 RX ORDER — HYDROMORPHONE HCL/PF 1 MG/ML
1 SYRINGE (ML) INJECTION EVERY 4 HOURS PRN
Status: DISCONTINUED | OUTPATIENT
Start: 2023-08-19 | End: 2023-09-06 | Stop reason: HOSPADM

## 2023-08-19 RX ORDER — DOCUSATE SODIUM 100 MG/1
100 CAPSULE, LIQUID FILLED ORAL 2 TIMES DAILY
Status: DISCONTINUED | OUTPATIENT
Start: 2023-08-19 | End: 2023-08-22

## 2023-08-19 RX ORDER — OXYCODONE HYDROCHLORIDE 5 MG/1
5 TABLET ORAL EVERY 8 HOURS PRN
Status: DISCONTINUED | OUTPATIENT
Start: 2023-08-19 | End: 2023-09-06 | Stop reason: HOSPADM

## 2023-08-19 RX ORDER — ONDANSETRON 2 MG/ML
4 INJECTION INTRAMUSCULAR; INTRAVENOUS EVERY 6 HOURS PRN
Status: DISCONTINUED | OUTPATIENT
Start: 2023-08-19 | End: 2023-09-06 | Stop reason: HOSPADM

## 2023-08-19 RX ORDER — SEVELAMER HYDROCHLORIDE 800 MG/1
800 TABLET, FILM COATED ORAL
Status: DISCONTINUED | OUTPATIENT
Start: 2023-08-19 | End: 2023-09-06 | Stop reason: HOSPADM

## 2023-08-19 RX ORDER — FAMOTIDINE 20 MG/1
10 TABLET, FILM COATED ORAL DAILY
Status: DISCONTINUED | OUTPATIENT
Start: 2023-08-19 | End: 2023-09-06 | Stop reason: HOSPADM

## 2023-08-19 RX ADMIN — ACETAMINOPHEN 975 MG: 325 TABLET ORAL at 22:14

## 2023-08-19 RX ADMIN — HYDROMORPHONE HYDROCHLORIDE 1 MG: 1 INJECTION, SOLUTION INTRAMUSCULAR; INTRAVENOUS; SUBCUTANEOUS at 20:54

## 2023-08-19 RX ADMIN — LIDOCAINE 5% 1 PATCH: 700 PATCH TOPICAL at 14:22

## 2023-08-19 RX ADMIN — ATORVASTATIN CALCIUM 80 MG: 80 TABLET, FILM COATED ORAL at 14:23

## 2023-08-19 RX ADMIN — Medication 400 MG: at 18:50

## 2023-08-19 RX ADMIN — ACETAMINOPHEN 975 MG: 325 TABLET ORAL at 14:23

## 2023-08-19 RX ADMIN — OXYCODONE HYDROCHLORIDE 5 MG: 5 TABLET ORAL at 14:23

## 2023-08-19 RX ADMIN — FAMOTIDINE 10 MG: 20 TABLET, FILM COATED ORAL at 14:23

## 2023-08-19 RX ADMIN — METHOCARBAMOL 500 MG: 500 TABLET ORAL at 14:23

## 2023-08-19 NOTE — ED NOTES
Pt refused CT, Dr Km Gamez at bedside to eval. Pt continues to refused. RN called Steven Maldonado, pt had been asking about her if she is here,. She was at his house when the squad arrived. his daughter [phone].  She is coming to hospital. MD made aware     Debi Dakins, RN  08/19/23 1462

## 2023-08-19 NOTE — H&P
[address]  H&P  Name: Enrique Harvey 58 y.o. male I MRN: [id number]  Unit/Bed#: 913 [facility] 421-01 I Date of Admission: 8/19/2023   Date of Service: 8/19/2023 I Hospital Day: 0      Assessment/Plan   * Status post fall  Assessment & Plan  Patient sustained a fall about 2 days ago  Patient does not remember how he fell. Patient was found on the floor and could not get up. We will get PT/OT evaluation and treatment  CAT scan of the brain, CT chest abdomen pelvis, cervical spine and lumbar reconstructions study was ordered in the ED but patient refused the studies    Low back pain  Assessment & Plan  Patient has history of compression fracture of the T10  Currently complaining of severe lower back pain but refusing CAT scan of the lumbar spine or any imaging  Pain control with Lidoderm, Tylenol, oxycodone and Dilaudid for breakthrough pain  PT/OT evaluation and treatment    ESRD (end stage renal disease) [facility])  Assessment & Plan  Lab Results   Component Value Date    EGFR 6 08/19/2023    EGFR 9 07/30/2022    EGFR 14 07/29/2022    CREATININE 7.48 (H) 08/19/2023    CREATININE 5.57 (H) 07/30/2022    CREATININE 4.17 (H) 07/29/2022   Patient is on Tuesday Thursday Saturday schedule at [address] hemodialysis was on 8/17/2023  Nephrology was consulted and patient will be scheduled for hemodialysis today.   Patient has right IJ permacath    Rhabdomyolysis  Assessment & Plan  After sustaining a fall  CK level mildly elevated at 1200   hold off on IV fluids as per my discussion with nephrology      Hypertension  Assessment & Plan  Continue Procardia 30 mg p.o. twice daily, Coreg 12.5 mg p.o. twice daily    Type 2 diabetes mellitus, without long-term current use of insulin ([facility])  Assessment & Plan  Lab Results   Component Value Date    HGBA1C 6.6 (H) 07/07/2022       Recent Labs     08/19/23  1042   POCGLU 157*       Blood Sugar Average: Last 72 hrs:  (P) 157   Patient is on linagliptin at home.  Patient given Humalog sliding scale with Accu-Cheks before every meal and at bedtime. Patient will be on diabetic diet. VTE Pharmacologic Prophylaxis: VTE Score: 2 Low Risk (Score 0-2) - Encourage Ambulation. Code Status: Level 1 - Full Code   Discussion with family: Updated  (daughter) at bedside. Anticipated Length of Stay: Patient will be admitted on an inpatient basis with an anticipated length of stay of greater than 2 midnights secondary to Status post fall with low back pain. Total Time Spent on Date of Encounter in care of patient: 55 minutes This time was spent on one or more of the following: performing physical exam; counseling and coordination of care; obtaining or reviewing history; documenting in the medical record; reviewing/ordering tests, medications or procedures; communicating with other healthcare professionals and discussing with patient's family/caregivers. Chief Complaint: Fall    History of Present Illness:  Lucio James is a 58 y.o. male with a PMH of diabetes mellitus, hypertension, TIA, ESRD on hemodialysis on Tuesday Thursday Saturday schedule, history of T10 compression fracture who presents after sustaining a fall and was found on the floor by the daughter. Patient does not remember how he fell. Patient most likely fell on Thursday evening and stayed on the floor for 2 days. Patient has been calling for help but nobody came in for help. Patient was found on the floor by the daughter this morning and called EMS. Patient complaining of some back pain. Denies any headache, dizziness, chest pain, shortness of breath, abdominal pain. Review of Systems:  Review of Systems   Constitutional: Negative for chills, diaphoresis, fatigue and unexpected weight change.    HENT: Negative for congestion, ear discharge, ear pain, facial swelling, hearing loss, mouth sores, nosebleeds, postnasal drip, rhinorrhea, sinus pressure, sneezing, sore throat, tinnitus, trouble swallowing and voice change. Eyes: Negative for photophobia, discharge, redness and visual disturbance. Respiratory: Negative for cough, chest tightness, shortness of breath, wheezing and stridor. Cardiovascular: Negative for chest pain, palpitations and leg swelling. Gastrointestinal: Negative for abdominal distention, abdominal pain, anal bleeding, blood in stool, constipation, diarrhea, nausea and vomiting. Endocrine: Negative for polydipsia, polyphagia and polyuria. Genitourinary: Negative for decreased urine volume, difficulty urinating, dysuria, flank pain, frequency, hematuria and urgency. Musculoskeletal: Positive for back pain. Negative for arthralgias and neck stiffness. Skin: Negative for pallor and rash. Neurological: Negative for dizziness, seizures, facial asymmetry, speech difficulty, light-headedness, numbness and headaches. Hematological: Negative for adenopathy. Does not bruise/bleed easily. Psychiatric/Behavioral: Negative for agitation and confusion. Past Medical and Surgical History:   Past Medical History:   Diagnosis Date   • CKD    • Diabetes mellitus ([address])    • Hypertension    • Left toe amputee ([address])    • TIA (transient ischemic attack)        Past Surgical History:   Procedure Laterality Date   • IR TUNNELED DIALYSIS CATHETER PLACEMENT  1/27/2022       Meds/Allergies:  Prior to Admission medications    Medication Sig Start Date End Date Taking?  Authorizing Provider   acetaminophen (TYLENOL) 325 mg tablet Take 3 tablets (975 mg total) by mouth every 8 (eight) hours 7/31/22  Yes Lore Silver,    atorvastatin (LIPITOR) 80 mg tablet Take 80 mg by mouth daily 11/2/21 8/19/23 Yes Historical Provider, MD   carvedilol (COREG) 6.25 mg tablet Take 12.5 mg by mouth 2 (two) times a day 11/16/21  Yes Historical Provider, MD   clopidogrel (PLAVIX) 75 mg tablet Take 75 mg by mouth daily 11/2/21  Yes Historical Provider, MD   docusate sodium (COLACE) 100 mg capsule Take 1 capsule (100 mg total) by mouth 2 (two) times a day 7/31/22  Yes Lore Silver DO   famotidine (PEPCID) 10 mg tablet Take 1 tablet (10 mg total) by mouth daily 8/1/22  Yes Lore Silver DO   lidocaine (LIDODERM) 5 % Apply 1 patch topically daily Lower back - Remove & Discard patch within 12 hours or as directed by MD  Patient taking differently: Apply 1 patch topically if needed Lower extremity  - Remove & Discard patch within 12 hours or as directed by MD 2/3/22  Yes Lore Silver DO   linaGLIPtin 5 MG TABS Take 5 mg by mouth in the morning 7/31/22  Yes Lore Silver DO   methocarbamol (ROBAXIN) 500 mg tablet Take 1 tablet (500 mg total) by mouth every 12 (twelve) hours as needed for muscle spasms (back/hip pain and spasm) 7/31/22  Yes Lore Silver DO   NIFEdipine ER (ADALAT CC) 30 MG 24 hr tablet Take 30 mg by mouth 2 (two) times a day 11/15/21  Yes Historical Provider, MD   oxyCODONE (ROXICODONE) 5 immediate release tablet Take 1 tablet (5 mg total) by mouth every 8 (eight) hours as needed for severe pain or moderate pain for up to 10 doses Max Daily Amount: 15 mg 7/31/22  Yes Lore Silver DO   predniSONE 10 mg tablet Take 3 tablets daily for 4 days, then take 2 tablets daily for 4 days, then take 1 tablet daily for 4 days 8/1/22  Yes Lore Silver DO   sevelamer (RENAGEL) 800 mg tablet Take 1 tablet (800 mg total) by mouth 3 (three) times a day with meals 2/2/22  Yes Lore Silver DO   allopurinol (ZYLOPRIM) 100 mg tablet Take 100 mg by mouth daily 11/2/21 7/6/22  Historical Provider, MD   naloxone (NARCAN) 4 mg/0.1 mL nasal spray Administer 1 spray into a nostril. If no response after 2-3 minutes, give another dose in the other nostril using a new spray. 7/16/22   Lore Silver DO     I have reviewed home medications using recent Epic encounter.     Allergies: No Known Allergies    Social History:  Marital Status:    Patient Pre-hospital Living Situation: Apartment, Alone  Patient Pre-hospital Level of Mobility: walks  Patient Pre-hospital Diet Restrictions: Diabetic and renal diet  Substance Use History:   Social History     Substance and Sexual Activity   Alcohol Use Not Currently   • Alcohol/week: 0.0 standard drinks of alcohol    Comment: 0     Social History     Tobacco Use   Smoking Status Never   • Passive exposure: Never   Smokeless Tobacco Never     Social History     Substance and Sexual Activity   Drug Use Never       Family History:  History reviewed. No pertinent family history. Physical Exam:     Vitals:   Blood Pressure: 125/62 (08/19/23 1800)  Pulse: 67 (08/19/23 1800)  Temperature: 97.9 °F (36.6 °C) (08/19/23 1513)  Temp Source: Oral (08/19/23 1315)  Respirations: 18 (08/19/23 1800)  Height: 6' 1" (185.4 cm) (08/19/23 1423)  Weight - Scale: 115 kg (253 lb 8.5 oz) (08/19/23 1335)  SpO2: 94 % (08/19/23 1513)    Physical Exam  Constitutional:       Appearance: Normal appearance. HENT:      Head: Normocephalic and atraumatic. Eyes:      Extraocular Movements: Extraocular movements intact. Pupils: Pupils are equal, round, and reactive to light. Cardiovascular:      Rate and Rhythm: Normal rate and regular rhythm. Heart sounds: No murmur heard. No gallop. Pulmonary:      Effort: Pulmonary effort is normal.      Breath sounds: Normal breath sounds. Abdominal:      General: Bowel sounds are normal.      Palpations: Abdomen is soft. Tenderness: There is no abdominal tenderness. Musculoskeletal:         General: No swelling or deformity. Normal range of motion. Cervical back: Normal range of motion and neck supple. Skin:     General: Skin is warm and dry. Neurological:      General: No focal deficit present. Mental Status: He is alert.          Additional Data:     Lab Results:  Results from last 7 days   Lab Units 08/19/23  1053   WBC Thousand/uL 14.01*   HEMOGLOBIN g/dL 11.4*   HEMATOCRIT % 35.5* PLATELETS Thousands/uL 213   NEUTROS PCT % 80*   LYMPHS PCT % 8*   MONOS PCT % 11   EOS PCT % 0     Results from last 7 days   Lab Units 08/19/23  1053   SODIUM mmol/L 134*   POTASSIUM mmol/L 4.1   CHLORIDE mmol/L 94*   CO2 mmol/L 32   BUN mg/dL 38*   CREATININE mg/dL 7.48*   ANION GAP mmol/L 8   CALCIUM mg/dL 10.2   ALBUMIN g/dL 3.7   TOTAL BILIRUBIN mg/dL 0.68   ALK PHOS U/L 69   ALT U/L 14   AST U/L 35   GLUCOSE RANDOM mg/dL 136     Results from last 7 days   Lab Units 08/19/23  1053   INR  1.16     Results from last 7 days   Lab Units 08/19/23  1042   POC GLUCOSE mg/dl 157*               Lines/Drains:  Invasive Devices     Peripheral Intravenous Line  Duration           Peripheral IV 07/26/22 Left Antecubital 389 days          Hemodialysis Catheter  Duration           HD Permanent Double Catheter 569 days                    Imaging:   No orders to display       EKG and Other Studies Reviewed on Admission:   · EKG: Normal sinus rhythm without any acute ST-T changes    ** Please Note: This note has been constructed using a voice recognition system.  **

## 2023-08-19 NOTE — ASSESSMENT & PLAN NOTE
Patient sustained a fall about 2 days ago  Etiology not clear-EKG was unremarkable. Blood sugar was 157. Questionable hypoglycemia versus accidental fall. Patient not exhibiting any signs of stroke. Patient will be monitored on telemetry. Patient does not remember how he fell. Patient was found on the floor and could not get up.   We will get PT/OT evaluation and treatment  CAT scan of the brain, CT chest abdomen pelvis, cervical spine and lumbar reconstructions study was ordered in the ED but patient refused the studies

## 2023-08-19 NOTE — Clinical Note
Case was discussed with JANIA and the patient's admission status was agreed to be Admission Status: observation status to the service of Dr. Branden Martinez.

## 2023-08-19 NOTE — ED NOTES
Daughter reported that she came in to check on him since he has not answering phone for the past 2 days. Pt was screaming for help by the time she got here. Her mom last time saw him on Thursday. Pt doesn't know how he got to the floor from him couch. Py awake, follows commands, doesn't answer the month and year. He is conversing to daughter at bedside. Ex wife and other daughter are convincing him to get CT, he refused. Pt has wound to R foot and doesn't see wound care, he said he took care of it himself.       Alva Escobedo RN  08/19/23 [id number]

## 2023-08-19 NOTE — ASSESSMENT & PLAN NOTE
White count elevated 14,000 but patient is afebrile  Chart review showed chronic elevation of white count  Likely stress response

## 2023-08-19 NOTE — ASSESSMENT & PLAN NOTE
Patient has history of compression fracture of the T10  Currently complaining of severe lower back pain but refusing CAT scan of the lumbar spine or any imaging  Pain control with Lidoderm, Tylenol, oxycodone and Dilaudid for breakthrough pain  PT/OT evaluation and treatment

## 2023-08-19 NOTE — ASSESSMENT & PLAN NOTE
Lab Results   Component Value Date    HGBA1C 6.6 (H) 07/07/2022       Recent Labs     08/19/23  1042   POCGLU 157*       Blood Sugar Average: Last 72 hrs:  (P) 157   Patient is on linagliptin at home. Patient given Humalog sliding scale with Accu-Cheks before every meal and at bedtime. Patient will be on diabetic diet.

## 2023-08-19 NOTE — ED NOTES
Raghavendra Garnica daughter at bedside, he still refused CT. Dialysis RN on the way. Pt will be admitted.       Daphnie Merrill RN  08/19/23 8201

## 2023-08-19 NOTE — CONSULTS
[facility] 58 y.o. male MRN: [id number]  Unit/Bed#: ED 06 Encounter: [id number]    ASSESSMENT and PLAN:  Anders Adams is a 58 y.o. male who was admitted to [address] [place] [address] after presenting with fall and back pain. A renal consultation is requested today for assistance in the management of ESRD on HD. 1. ESRD on HD  - Schedule: TTS at [address] HD: 08/17  - We will arrange hemodialysis today  - Access is right IJ permacath    2. HTN/Volume   - Medications: Continue nifedipine and Coreg at home doses  - UF: We will ultrafilter 2.0 to 2.5 L on dialysis today    3. Anemia   - Goal Hb 10-11, labs pending today  - Outpatient medications: We will get records from his dialysis unit regarding outpatient management of anemia    4. Electrolytes   - Labs pending today, we will tailor our dialysis prescription based on labs today    5. Bone Mineral Disease   - Continue sevelamer with meals for hyperphosphatemia    6. Altered mental status  - Evaluation per emergency team  - Plans noted for CT head    7. Back pain  - Evaluation per emergency team  - Plans noted for CT of lumbar spine    Discussed with emergency room team.  After discussion, we agreed that patient is in need for hemodialysis today and we will arrange it this afternoon. HISTORY OF PRESENT ILLNESS:  Requesting Physician: Sandra Silverman MD  Reason for Consult: ESRD on HD    Andres Adams is a 58 y.o. male who was admitted to [address] [place] [address] after presenting with fall and back pain. A renal consultation is requested today for assistance in the management of ESRD on HD. Patient not able to recall events from this morning. Patient arrived via BLS from home with reports of fall and back pain. Patient appears confused today. Review of systems is not available due to confusion.     PAST MEDICAL HISTORY:  Past Medical History:   Diagnosis Date   • CKD    • Diabetes mellitus ([address]) • Hypertension    • Left toe amputee ([address])    • TIA (transient ischemic attack)        PAST SURGICAL HISTORY:  Past Surgical History:   Procedure Laterality Date   • IR TUNNELED DIALYSIS CATHETER PLACEMENT  1/27/2022       ALLERGIES:  No Known Allergies    SOCIAL HISTORY:  Social History     Substance and Sexual Activity   Alcohol Use Not Currently   • Alcohol/week: 0.0 standard drinks of alcohol    Comment: 0     Social History     Substance and Sexual Activity   Drug Use Never     Social History     Tobacco Use   Smoking Status Never   Smokeless Tobacco Never       FAMILY HISTORY:  History reviewed. No pertinent family history. MEDICATIONS:  No current facility-administered medications for this encounter.     Current Outpatient Medications:   •  acetaminophen (TYLENOL) 325 mg tablet, Take 3 tablets (975 mg total) by mouth every 8 (eight) hours, Disp: , Rfl: 0  •  allopurinol (ZYLOPRIM) 100 mg tablet, Take 100 mg by mouth daily, Disp: , Rfl:   •  atorvastatin (LIPITOR) 80 mg tablet, Take 80 mg by mouth daily, Disp: , Rfl:   •  carvedilol (COREG) 6.25 mg tablet, Take 12.5 mg by mouth 2 (two) times a day, Disp: , Rfl:   •  clopidogrel (PLAVIX) 75 mg tablet, Take 75 mg by mouth daily (Patient not taking: Reported on 7/26/2022), Disp: , Rfl:   •  docusate sodium (COLACE) 100 mg capsule, Take 1 capsule (100 mg total) by mouth 2 (two) times a day, Disp: 60 capsule, Rfl: 0  •  famotidine (PEPCID) 10 mg tablet, Take 1 tablet (10 mg total) by mouth daily, Disp: 20 tablet, Rfl: 0  •  lidocaine (LIDODERM) 5 %, Apply 1 patch topically daily Lower back - Remove & Discard patch within 12 hours or as directed by MD (Patient taking differently: Apply 1 patch topically if needed Lower extremity - Remove & Discard patch within 12 hours or as directed by MD), Disp: , Rfl: 0  •  linaGLIPtin 5 MG TABS, Take 5 mg by mouth in the morning, Disp: 30 tablet, Rfl: 0  •  methocarbamol (ROBAXIN) 500 mg tablet, Take 1 tablet (500 mg total) by mouth every 12 (twelve) hours as needed for muscle spasms (back/hip pain and spasm), Disp: 30 tablet, Rfl: 0  •  naloxone (NARCAN) 4 mg/0.1 mL nasal spray, Administer 1 spray into a nostril. If no response after 2-3 minutes, give another dose in the other nostril using a new spray., Disp: 1 each, Rfl: 0  •  NIFEdipine ER (ADALAT CC) 30 MG 24 hr tablet, Take 30 mg by mouth 2 (two) times a day, Disp: , Rfl:   •  oxyCODONE (ROXICODONE) 5 immediate release tablet, Take 1 tablet (5 mg total) by mouth every 8 (eight) hours as needed for severe pain or moderate pain for up to 10 doses Max Daily Amount: 15 mg, Disp: 10 tablet, Rfl: 0  •  predniSONE 10 mg tablet, Take 3 tablets daily for 4 days, then take 2 tablets daily for 4 days, then take 1 tablet daily for 4 days, Disp: 24 tablet, Rfl: 0  •  sevelamer (RENAGEL) 800 mg tablet, Take 1 tablet (800 mg total) by mouth 3 (three) times a day with meals, Disp: , Rfl: 0    REVIEW OF SYSTEMS:  Review of Systems   Reason unable to perform ROS: Patient is confused. PHYSICAL EXAM:  Current Weight: Weight - Scale: 115 kg (253 lb 8.5 oz)  First Weight: Weight - Scale: 115 kg (253 lb 8.5 oz)  Vitals:    08/19/23 1030   BP: (!) 180/77   BP Location: Right arm   Pulse: 70   Resp: 18   Temp: 98.7 °F (37.1 °C)   TempSrc: Oral   SpO2: 99%   Weight: 115 kg (253 lb 8.5 oz)     No intake or output data in the 24 hours ending 08/19/23 1050  Physical Exam  Constitutional:       Appearance: Normal appearance. HENT:      Head: Normocephalic and atraumatic. Mouth/Throat:      Mouth: Mucous membranes are moist.      Pharynx: Oropharynx is clear. Cardiovascular:      Rate and Rhythm: Normal rate and regular rhythm. Pulses: Normal pulses. Heart sounds: Normal heart sounds. Comments: Right IJ permacath  Pulmonary:      Effort: Pulmonary effort is normal.      Breath sounds: Normal breath sounds.    Abdominal:      General: Bowel sounds are normal.      Palpations: Abdomen is soft. Musculoskeletal:         General: Normal range of motion. Right lower leg: No edema. Left lower leg: No edema. Skin:     General: Skin is warm and dry. Neurological:      General: No focal deficit present. Mental Status: He is alert and oriented to person, place, and time. Mental status is at baseline. Psychiatric:         Mood and Affect: Mood normal.         Behavior: Behavior normal.         Thought Content: Thought content normal.         Judgment: Judgment normal.       Portions of the record may have been created with voice recognition software. Occasional wrong word or "sound a like" substitutions may have occurred due to the inherent limitations of voice recognition software. Read the chart carefully and recognize, using context, where substitutions have occurred. If you have any questions, please contact the dictating provider.

## 2023-08-19 NOTE — ED PROVIDER NOTES
History  Chief Complaint   Patient presents with   • Fall     Pt arrives BLS from home, reported that he was found on the ground, co of lower back pain. Pt awake, follows commands. Not answers the time and year "I don't know". Daughter called 911. Pt doesn't know how he fall and how long he has been on the floor. Pt is a 57yo M who presents after being found down. Patient reports that he ended up on the ground of his house and is unsure how he got there. Patient unclear if he fell or rolled off the couch. Patient states he believes he was on the ground for approximately 2 days "yelling for help". Patient denies any complaints at this time. He did complain of back pain to EMS and when asked about this states that he is in fact having back pain and has been for the past 2 days. Patient denying any other complaints at this time. Patient is ESRD with Tuesday Thursday Saturday dialysis. Patient reports he did get dialysis on Thursday but missed his dialysis this AM.  No further history able to be obtained from patient. Prior to Admission Medications   Prescriptions Last Dose Informant Patient Reported? Taking?    NIFEdipine ER (ADALAT CC) 30 MG 24 hr tablet Past Week  Yes Yes   Sig: Take 30 mg by mouth 2 (two) times a day   acetaminophen (TYLENOL) 325 mg tablet Past Week  No Yes   Sig: Take 3 tablets (975 mg total) by mouth every 8 (eight) hours   allopurinol (ZYLOPRIM) 100 mg tablet More than a month  Yes No   Sig: Take 100 mg by mouth daily   atorvastatin (LIPITOR) 80 mg tablet Past Week  Yes Yes   Sig: Take 80 mg by mouth daily   carvedilol (COREG) 6.25 mg tablet Past Week  Yes Yes   Sig: Take 12.5 mg by mouth 2 (two) times a day   clopidogrel (PLAVIX) 75 mg tablet Past Week  Yes Yes   Sig: Take 75 mg by mouth daily   docusate sodium (COLACE) 100 mg capsule Past Month  No Yes   Sig: Take 1 capsule (100 mg total) by mouth 2 (two) times a day   famotidine (PEPCID) 10 mg tablet Past Week  No Yes Sig: Take 1 tablet (10 mg total) by mouth daily   lidocaine (LIDODERM) 5 % Past Week  No Yes   Sig: Apply 1 patch topically daily Lower back - Remove & Discard patch within 12 hours or as directed by MD   Patient taking differently: Apply 1 patch topically if needed Lower extremity  - Remove & Discard patch within 12 hours or as directed by MD   linaGLIPtin 5 MG TABS Past Week  No Yes   Sig: Take 5 mg by mouth in the morning   methocarbamol (ROBAXIN) 500 mg tablet Past Week  No Yes   Sig: Take 1 tablet (500 mg total) by mouth every 12 (twelve) hours as needed for muscle spasms (back/hip pain and spasm)   naloxone (NARCAN) 4 mg/0.1 mL nasal spray Unknown  No No   Sig: Administer 1 spray into a nostril. If no response after 2-3 minutes, give another dose in the other nostril using a new spray. oxyCODONE (ROXICODONE) 5 immediate release tablet Past Week  No Yes   Sig: Take 1 tablet (5 mg total) by mouth every 8 (eight) hours as needed for severe pain or moderate pain for up to 10 doses Max Daily Amount: 15 mg   predniSONE 10 mg tablet Past Week  No Yes   Sig: Take 3 tablets daily for 4 days, then take 2 tablets daily for 4 days, then take 1 tablet daily for 4 days   sevelamer (RENAGEL) 800 mg tablet Past Week  No Yes   Sig: Take 1 tablet (800 mg total) by mouth 3 (three) times a day with meals      Facility-Administered Medications: None       Past Medical History:   Diagnosis Date   • CKD    • Diabetes mellitus ([address])    • Hypertension    • Left toe amputee ([address])    • TIA (transient ischemic attack)        Past Surgical History:   Procedure Laterality Date   • IR TUNNELED DIALYSIS CATHETER PLACEMENT  1/27/2022       History reviewed. No pertinent family history. I have reviewed and agree with the history as documented.     E-Cigarette/Vaping   • E-Cigarette Use Never User      E-Cigarette/Vaping Substances   • Nicotine No    • THC No    • CBD No    • Flavoring No    • Other No    • Unknown No      Social History Tobacco Use   • Smoking status: Never     Passive exposure: Never   • Smokeless tobacco: Never   Vaping Use   • Vaping Use: Never used   Substance Use Topics   • Alcohol use: Not Currently     Alcohol/week: 0.0 standard drinks of alcohol     Comment: 0   • Drug use: Never       Review of Systems   Musculoskeletal: Positive for back pain. Skin: Positive for wound (R foot). All other systems reviewed and are negative. Physical Exam  Physical Exam  Vitals reviewed. Constitutional:       General: He is not in acute distress. Appearance: He is well-developed. He is not toxic-appearing or diaphoretic. HENT:      Head: Normocephalic and atraumatic. Right Ear: External ear normal.      Left Ear: External ear normal.      Nose: Nose normal.      Mouth/Throat:      Pharynx: Oropharynx is clear. Eyes:      Extraocular Movements: Extraocular movements intact. Pupils: Pupils are equal, round, and reactive to light. Cardiovascular:      Rate and Rhythm: Normal rate and regular rhythm. Heart sounds: Normal heart sounds. Pulmonary:      Effort: Pulmonary effort is normal. No respiratory distress. Breath sounds: Normal breath sounds. Abdominal:      General: There is no distension. Palpations: Abdomen is soft. There is no mass. Tenderness: There is abdominal tenderness (R sided). There is no guarding or rebound. Musculoskeletal:         General: Normal range of motion. Cervical back: Normal range of motion and neck supple. Skin:     General: Skin is warm and dry. Capillary Refill: Capillary refill takes less than 2 seconds. Comments: Wound to R foot with dressing in place   Neurological:      Mental Status: He is alert. Comments: A&O to person and place but not to time  Responses to questions occasionally confused  Moving all extremities  Following all commands   Psychiatric:         Speech: Speech normal.         Behavior: Behavior is cooperative. Vital Signs  ED Triage Vitals [08/19/23 1030]   Temperature Pulse Respirations Blood Pressure SpO2   98.7 °F (37.1 °C) 70 18 (!) 180/77 99 %      Temp Source Heart Rate Source Patient Position - Orthostatic VS BP Location FiO2 (%)   Oral Monitor Sitting Right arm --      Pain Score       7           Vitals:    08/19/23 1130 08/19/23 1315 08/19/23 1335 08/19/23 1513   BP: 159/69 157/78 152/71 142/60   Pulse: 69 68 70 66   Patient Position - Orthostatic VS:  Sitting           Visual Acuity      ED Medications  Medications   acetaminophen (TYLENOL) tablet 975 mg (975 mg Oral Given 8/19/23 1423)   atorvastatin (LIPITOR) tablet 80 mg (80 mg Oral Given 8/19/23 1423)   carvedilol (COREG) tablet 12.5 mg (has no administration in time range)   docusate sodium (COLACE) capsule 100 mg (has no administration in time range)   famotidine (PEPCID) tablet 10 mg (10 mg Oral Given 8/19/23 1423)   sevelamer (RENAGEL) tablet 800 mg (has no administration in time range)   methocarbamol (ROBAXIN) tablet 500 mg (500 mg Oral Given 8/19/23 1423)   oxyCODONE (ROXICODONE) IR tablet 5 mg (5 mg Oral Given 8/19/23 1423)   ondansetron (ZOFRAN) injection 4 mg (has no administration in time range)   lidocaine (LIDODERM) 5 % patch 1 patch (1 patch Topical Medication Applied 8/19/23 1422)   HYDROmorphone (DILAUDID) injection 1 mg (has no administration in time range)   NIFEdipine (PROCARDIA XL) 24 hr tablet 30 mg (has no administration in time range)       Diagnostic Studies  Results Reviewed     Procedure Component Value Units Date/Time    Comprehensive metabolic panel [[id number]]  (Abnormal) Collected: 08/19/23 1053    Lab Status: Final result Specimen: Blood from Arm, Left Updated: 08/19/23 1125     Sodium 134 mmol/L      Potassium 4.1 mmol/L      Chloride 94 mmol/L      CO2 32 mmol/L      ANION GAP 8 mmol/L      BUN 38 mg/dL      Creatinine 7.48 mg/dL      Glucose 136 mg/dL      Calcium 10.2 mg/dL      AST 35 U/L      ALT 14 U/L Alkaline Phosphatase 69 U/L      Total Protein 7.8 g/dL      Albumin 3.7 g/dL      Total Bilirubin 0.68 mg/dL      eGFR 6 ml/min/1.73sq m     Narrative:      [place] guidelines for Chronic Kidney Disease (CKD):   •  Stage 1 with normal or high GFR (GFR > 90 mL/min/1.73 square meters)  •  Stage 2 Mild CKD (GFR = 60-89 mL/min/1.73 square meters)  •  Stage 3A Moderate CKD (GFR = 45-59 mL/min/1.73 square meters)  •  Stage 3B Moderate CKD (GFR = 30-44 mL/min/1.73 square meters)  •  Stage 4 Severe CKD (GFR = 15-29 mL/min/1.73 square meters)  •  Stage 5 End Stage CKD (GFR <15 mL/min/1.73 square meters)  Note: GFR calculation is accurate only with a steady state creatinine    CK [[id number]]  (Abnormal) Collected: 08/19/23 1053    Lab Status: Final result Specimen: Blood from Arm, Left Updated: 08/19/23 1125     Total CK 1,057 U/L     Magnesium [[id number]]  (Abnormal) Collected: 08/19/23 1053    Lab Status: Final result Specimen: Blood from Arm, Left Updated: 08/19/23 1124     Magnesium 1.8 mg/dL     Phosphorus [[id number]]  (Abnormal) Collected: 08/19/23 1053    Lab Status: Final result Specimen: Blood from Arm, Left Updated: 08/19/23 1124     Phosphorus 1.8 mg/dL     Protime-INR [[id number]]  (Abnormal) Collected: 08/19/23 1053    Lab Status: Final result Specimen: Blood from Arm, Left Updated: 08/19/23 1123     Protime 14.9 seconds      INR 1.16    APTT [[id number]]  (Normal) Collected: 08/19/23 1053    Lab Status: Final result Specimen: Blood from Arm, Left Updated: 08/19/23 1123     PTT 33 seconds     CBC and differential [[id number]]  (Abnormal) Collected: 08/19/23 1053    Lab Status: Final result Specimen: Blood from Arm, Left Updated: 08/19/23 1059     WBC 14.01 Thousand/uL      RBC 3.85 Million/uL      Hemoglobin 11.4 g/dL      Hematocrit 35.5 %      MCV 92 fL      MCH 29.6 pg      MCHC 32.1 g/dL      RDW 15.9 %      MPV 9.8 fL      Platelets 628 Thousands/uL      nRBC 0 /100 WBCs Neutrophils Relative 80 %      Immat GRANS % 1 %      Lymphocytes Relative 8 %      Monocytes Relative 11 %      Eosinophils Relative 0 %      Basophils Relative 0 %      Neutrophils Absolute 11.21 Thousands/µL      Immature Grans Absolute 0.09 Thousand/uL      Lymphocytes Absolute 1.08 Thousands/µL      Monocytes Absolute 1.53 Thousand/µL      Eosinophils Absolute 0.06 Thousand/µL      Basophils Absolute 0.04 Thousands/µL     Fingerstick Glucose (POCT) [[id number]]  (Abnormal) Collected: 08/19/23 1042    Lab Status: Final result Updated: 08/19/23 1043     POC Glucose 157 mg/dl                  CT head wo contrast    (Results Pending)   CT spine cervical without contrast    (Results Pending)   CT abdomen pelvis with contrast    (Results Pending)   CT recon only lumbar spine    (Results Pending)              Procedures  Procedures         ED Course  ED Course as of 08/19/23 1605   Sat Aug 19, 2023   1044 POC Glucose(!): 157  WNL   1050 Nephrology aware and will plan to dialyze while here. 1051 Procedure Note: EKG  Date/Time: 08/19/23 10:51 AM   Interpreted by: Boo Kellogg MD  Indications / Diagnosis: Fall; Unknown cause  ECG reviewed by me, the ED Physician: yes   The EKG demonstrates:  Rhythm: normal sinus  Intervals: normal intervals  Axis: normal axis  QRS/Blocks: normal QRS  ST Changes: No acute ST Changes, no STD/NAZARIO.   1102 WBC(!): 14.01  Per chart review, hx of leukocytosis. Largely unchanged from prior. 1105 Hemoglobin(!): 11.4  Anemia improved from prior. 1122 Pt transported to CT scan and then refused CT. Pt states that 'he has been through it before and doesn't like it'. Pt stating that it gives him anxiety and pt offered anxiolysis but declined. Discussed risks and benefits including possibility of death if pt has ICH. Pt repeating 'It's not going to happen'.  Will call pt's daughter to discuss and see if she is able to discuss CT with pt.    1124 PTT: 33  WNL   1124 POCT INR: 1.16  WNL   1125 Pt's daughter coming. 1125 Total CK(!): 1,057  Elevated. 1126 Creatinine(!): 7.48  ESRD on dialysis. 1126 Magnesium(!): 1.8  Low. Will be corrected with dialysis. 1126 Phosphorus(!): 1.8  Low. Will be corrected with dialysis. [facility] contacted for admission. Medical Decision Making  Pt is a 57yo M who presents after being found down. Differential diagnosis to include but not limited to [address] [place] [address] Street, fracture, dislocation, rhabdo, electrolyte abnormality, intraabdominal pathology. Will plan for labs and CT head, c-spine, and A/P. See ED course for results and details. Plan to admit pt to [facility]. Pt discussed with admitting team and admission orders placed. Pt admitted without incident. Amount and/or Complexity of Data Reviewed  Labs: ordered. Decision-making details documented in ED Course. Risk  Decision regarding hospitalization. Disposition  Final diagnoses:   ESRD (end stage renal disease) ([address])   Elevated CK   Hypomagnesemia   Hypophosphatemia     Time reflects when diagnosis was documented in both MDM as applicable and the Disposition within this note     Time User Action Codes Description Comment    8/19/2023 10:49 AM Amy Gilliland Add [N18.6] ESRD (end stage renal disease) ([address])     8/19/2023 12:12 PM Amy Gilliland Add [R74.8] Elevated CK     8/19/2023 12:12 PM Amy Gilliland Add [E83.42] Hypomagnesemia     8/19/2023 12:12 PM Amy Gilliland Add [E83.39] Hypophosphatemia       ED Disposition     ED Disposition   Admit    Condition   Stable    Date/Time   Sat Aug 19, 2023  1:01 PM    Comment   Case was discussed with [facility] and the patient's admission status was agreed to be Admission Status: inpatient status to the service of Dr. Ivett Dick.            Follow-up Information    None         Current Discharge Medication List      CONTINUE these medications which have NOT CHANGED    Details   acetaminophen (TYLENOL) 325 mg tablet Take 3 tablets (975 mg total) by mouth every 8 (eight) hours  Refills: 0    Associated Diagnoses: T10 vertebral fracture (HCC)      atorvastatin (LIPITOR) 80 mg tablet Take 80 mg by mouth daily      carvedilol (COREG) 6.25 mg tablet Take 12.5 mg by mouth 2 (two) times a day      clopidogrel (PLAVIX) 75 mg tablet Take 75 mg by mouth daily      docusate sodium (COLACE) 100 mg capsule Take 1 capsule (100 mg total) by mouth 2 (two) times a day  Qty: 60 capsule, Refills: 0    Associated Diagnoses: Constipation      famotidine (PEPCID) 10 mg tablet Take 1 tablet (10 mg total) by mouth daily  Qty: 20 tablet, Refills: 0    Associated Diagnoses: Anemia      lidocaine (LIDODERM) 5 % Apply 1 patch topically daily Lower back - Remove & Discard patch within 12 hours or as directed by MD  Refills: 0    Associated Diagnoses: T10 vertebral fracture (HCC)      linaGLIPtin 5 MG TABS Take 5 mg by mouth in the morning  Qty: 30 tablet, Refills: 0    Associated Diagnoses: Diabetes mellitus (HCC)      methocarbamol (ROBAXIN) 500 mg tablet Take 1 tablet (500 mg total) by mouth every 12 (twelve) hours as needed for muscle spasms (back/hip pain and spasm)  Qty: 30 tablet, Refills: 0    Associated Diagnoses: Osteoarthritis of left hip      NIFEdipine ER (ADALAT CC) 30 MG 24 hr tablet Take 30 mg by mouth 2 (two) times a day      oxyCODONE (ROXICODONE) 5 immediate release tablet Take 1 tablet (5 mg total) by mouth every 8 (eight) hours as needed for severe pain or moderate pain for up to 10 doses Max Daily Amount: 15 mg  Qty: 10 tablet, Refills: 0    Comments: Please disregard prior Percocet prescription and dispense oxycodone instead  Associated Diagnoses: T10 vertebral fracture (HCC)      predniSONE 10 mg tablet Take 3 tablets daily for 4 days, then take 2 tablets daily for 4 days, then take 1 tablet daily for 4 days  Qty: 24 tablet, Refills: 0    Associated Diagnoses: Osteoarthritis of left hip; Right shoulder pain      sevelamer (RENAGEL) 800 mg tablet Take 1 tablet (800 mg total) by mouth 3 (three) times a day with meals  Refills: 0    Associated Diagnoses: ESRD (end stage renal disease) (HCC)      allopurinol (ZYLOPRIM) 100 mg tablet Take 100 mg by mouth daily      naloxone (NARCAN) 4 mg/0.1 mL nasal spray Administer 1 spray into a nostril. If no response after 2-3 minutes, give another dose in the other nostril using a new spray. Qty: 1 each, Refills: 0    Associated Diagnoses: Left hip pain             No discharge procedures on file.     PDMP Review       Value Time User    PDMP Reviewed  Yes 7/31/2022  1:21 PM Lauro Mckinney DO          ED Provider  Electronically Signed by           Melanie Petersen MD  08/19/23 9703

## 2023-08-19 NOTE — ASSESSMENT & PLAN NOTE
Lab Results   Component Value Date    EGFR 6 08/19/2023    EGFR 9 07/30/2022    EGFR 14 07/29/2022    CREATININE 7.48 (H) 08/19/2023    CREATININE 5.57 (H) 07/30/2022    CREATININE 4.17 (H) 07/29/2022   Patient is on Tuesday Thursday Saturday schedule at [address]  Last hemodialysis was on 8/17/2023  Nephrology was consulted and patient will be scheduled for hemodialysis today.   Patient has right IJ permacath

## 2023-08-19 NOTE — PLAN OF CARE
Chronic hemodialysis treatment planned for 210 minutes using a 3 K+ bath for potassium 4.1 earlier today. Fluid goal 3000 ml/2500 ml net as ordered by Dr. Nettie Lewis.         Post-Dialysis RN Treatment Note    Blood Pressure:  Pre 137/60 mm/Hg  Post 146/93 mmHg   EDW:   TBD   Weight:  Pre 110.2 kg   Post 108.7 kg   Mode of weight measurement:    Bed scale   Volume Removed:   2275 ml/1800 ml net - 200 ml xcm=0033 ml net   Treatment duration:   210 minutes    NS given:   100 ml nss x 2 for sbp <100   Treatment shortened:   no   Medications given during Rx:   none   Estimated Kt/V:   none   Access type:   RIJ Permacath   Access Issues:    Maintains 400 bfr   Report called to primary nurse:   Verbal at bedside to [employer] RN          Problem: METABOLIC, FLUID AND ELECTROLYTES - ADULT  Goal: Electrolytes maintained within normal limits  Description: INTERVENTIONS:  - Monitor labs and assess patient for signs and symptoms of electrolyte imbalances  - Administer electrolyte replacement as ordered  - Monitor response to electrolyte replacements, including repeat lab results as appropriate  - Instruct patient on fluid and nutrition as appropriate  Outcome: Progressing  Goal: Fluid balance maintained  Description: INTERVENTIONS:  - Monitor labs   - Monitor I/O and WT  - Instruct patient on fluid and nutrition as appropriate  - Assess for signs & symptoms of volume excess or deficit  Outcome: Progressing

## 2023-08-19 NOTE — ASSESSMENT & PLAN NOTE
After sustaining a fall  CK level mildly elevated at 1200   hold off on IV fluids as per my discussion with nephrology

## 2023-08-20 ENCOUNTER — APPOINTMENT (INPATIENT)
Dept: RADIOLOGY | Facility: HOSPITAL | Age: 63
DRG: 474 | End: 2023-08-20
Payer: MEDICARE

## 2023-08-20 PROBLEM — R41.82 ALTERED MENTAL STATUS: Status: ACTIVE | Noted: 2023-08-20

## 2023-08-20 LAB
ANION GAP SERPL CALCULATED.3IONS-SCNC: 9 MMOL/L
BASOPHILS # BLD AUTO: 0.03 THOUSANDS/ÂΜL (ref 0–0.1)
BASOPHILS NFR BLD AUTO: 0 % (ref 0–1)
BUN SERPL-MCNC: 27 MG/DL (ref 5–25)
CALCIUM SERPL-MCNC: 9.3 MG/DL (ref 8.4–10.2)
CHLORIDE SERPL-SCNC: 94 MMOL/L (ref 96–108)
CK SERPL-CCNC: 564 U/L (ref 39–308)
CO2 SERPL-SCNC: 31 MMOL/L (ref 21–32)
CREAT SERPL-MCNC: 5.15 MG/DL (ref 0.6–1.3)
EOSINOPHIL # BLD AUTO: 0.17 THOUSAND/ÂΜL (ref 0–0.61)
EOSINOPHIL NFR BLD AUTO: 2 % (ref 0–6)
ERYTHROCYTE [DISTWIDTH] IN BLOOD BY AUTOMATED COUNT: 15.9 % (ref 11.6–15.1)
GFR SERPL CREATININE-BSD FRML MDRD: 10 ML/MIN/1.73SQ M
GLUCOSE SERPL-MCNC: 112 MG/DL (ref 65–140)
GLUCOSE SERPL-MCNC: 120 MG/DL (ref 65–140)
GLUCOSE SERPL-MCNC: 143 MG/DL (ref 65–140)
GLUCOSE SERPL-MCNC: 168 MG/DL (ref 65–140)
GLUCOSE SERPL-MCNC: 98 MG/DL (ref 65–140)
HCT VFR BLD AUTO: 37.3 % (ref 36.5–49.3)
HGB BLD-MCNC: 11.7 G/DL (ref 12–17)
IMM GRANULOCYTES # BLD AUTO: 0.08 THOUSAND/UL (ref 0–0.2)
IMM GRANULOCYTES NFR BLD AUTO: 1 % (ref 0–2)
LYMPHOCYTES # BLD AUTO: 0.94 THOUSANDS/ÂΜL (ref 0.6–4.47)
LYMPHOCYTES NFR BLD AUTO: 8 % (ref 14–44)
MAGNESIUM SERPL-MCNC: 2 MG/DL (ref 1.9–2.7)
MCH RBC QN AUTO: 29.3 PG (ref 26.8–34.3)
MCHC RBC AUTO-ENTMCNC: 31.4 G/DL (ref 31.4–37.4)
MCV RBC AUTO: 93 FL (ref 82–98)
MONOCYTES # BLD AUTO: 1.03 THOUSAND/ÂΜL (ref 0.17–1.22)
MONOCYTES NFR BLD AUTO: 9 % (ref 4–12)
NEUTROPHILS # BLD AUTO: 9.1 THOUSANDS/ÂΜL (ref 1.85–7.62)
NEUTS SEG NFR BLD AUTO: 80 % (ref 43–75)
NRBC BLD AUTO-RTO: 0 /100 WBCS
PLATELET # BLD AUTO: 211 THOUSANDS/UL (ref 149–390)
PMV BLD AUTO: 10.4 FL (ref 8.9–12.7)
POTASSIUM SERPL-SCNC: 3.9 MMOL/L (ref 3.5–5.3)
RBC # BLD AUTO: 4 MILLION/UL (ref 3.88–5.62)
SODIUM SERPL-SCNC: 134 MMOL/L (ref 135–147)
WBC # BLD AUTO: 11.35 THOUSAND/UL (ref 4.31–10.16)

## 2023-08-20 PROCEDURE — 80048 BASIC METABOLIC PNL TOTAL CA: CPT | Performed by: NURSE PRACTITIONER

## 2023-08-20 PROCEDURE — 82550 ASSAY OF CK (CPK): CPT | Performed by: NURSE PRACTITIONER

## 2023-08-20 PROCEDURE — 83735 ASSAY OF MAGNESIUM: CPT | Performed by: INTERNAL MEDICINE

## 2023-08-20 PROCEDURE — 99232 SBSQ HOSP IP/OBS MODERATE 35: CPT | Performed by: INTERNAL MEDICINE

## 2023-08-20 PROCEDURE — 70496 CT ANGIOGRAPHY HEAD: CPT

## 2023-08-20 PROCEDURE — 97163 PT EVAL HIGH COMPLEX 45 MIN: CPT | Performed by: PHYSICAL THERAPIST

## 2023-08-20 PROCEDURE — 70498 CT ANGIOGRAPHY NECK: CPT

## 2023-08-20 PROCEDURE — 82948 REAGENT STRIP/BLOOD GLUCOSE: CPT

## 2023-08-20 PROCEDURE — 85025 COMPLETE CBC W/AUTO DIFF WBC: CPT | Performed by: NURSE PRACTITIONER

## 2023-08-20 PROCEDURE — G1004 CDSM NDSC: HCPCS

## 2023-08-20 RX ORDER — HALOPERIDOL 5 MG/ML
2 INJECTION INTRAMUSCULAR ONCE
Status: COMPLETED | OUTPATIENT
Start: 2023-08-20 | End: 2023-08-20

## 2023-08-20 RX ORDER — LORAZEPAM 2 MG/ML
1 INJECTION INTRAMUSCULAR ONCE
Status: DISCONTINUED | OUTPATIENT
Start: 2023-08-20 | End: 2023-08-20

## 2023-08-20 RX ORDER — ACETAMINOPHEN 325 MG/1
650 TABLET ORAL EVERY 6 HOURS PRN
Status: DISCONTINUED | OUTPATIENT
Start: 2023-08-20 | End: 2023-09-06 | Stop reason: HOSPADM

## 2023-08-20 RX ORDER — LORAZEPAM 2 MG/ML
0.5 INJECTION INTRAMUSCULAR EVERY 6 HOURS PRN
Status: DISCONTINUED | OUTPATIENT
Start: 2023-08-20 | End: 2023-09-06 | Stop reason: HOSPADM

## 2023-08-20 RX ORDER — LORAZEPAM 2 MG/ML
2 INJECTION INTRAMUSCULAR ONCE
Status: COMPLETED | OUTPATIENT
Start: 2023-08-20 | End: 2023-08-20

## 2023-08-20 RX ORDER — QUETIAPINE FUMARATE 25 MG/1
25 TABLET, FILM COATED ORAL ONCE
Status: COMPLETED | OUTPATIENT
Start: 2023-08-20 | End: 2023-08-20

## 2023-08-20 RX ADMIN — DOCUSATE SODIUM 100 MG: 100 CAPSULE, LIQUID FILLED ORAL at 09:44

## 2023-08-20 RX ADMIN — QUETIAPINE FUMARATE 25 MG: 25 TABLET ORAL at 23:37

## 2023-08-20 RX ADMIN — ACETAMINOPHEN 975 MG: 325 TABLET ORAL at 22:45

## 2023-08-20 RX ADMIN — FAMOTIDINE 10 MG: 20 TABLET, FILM COATED ORAL at 09:44

## 2023-08-20 RX ADMIN — Medication 400 MG: at 09:45

## 2023-08-20 RX ADMIN — ACETAMINOPHEN 975 MG: 325 TABLET ORAL at 06:09

## 2023-08-20 RX ADMIN — ACETAMINOPHEN 650 MG: 325 TABLET ORAL at 11:41

## 2023-08-20 RX ADMIN — LORAZEPAM 0.5 MG: 2 INJECTION INTRAMUSCULAR; INTRAVENOUS at 13:35

## 2023-08-20 RX ADMIN — ATORVASTATIN CALCIUM 80 MG: 80 TABLET, FILM COATED ORAL at 09:44

## 2023-08-20 RX ADMIN — NIFEDIPINE 30 MG: 30 TABLET, EXTENDED RELEASE ORAL at 09:45

## 2023-08-20 RX ADMIN — INSULIN LISPRO 1 UNITS: 100 INJECTION, SOLUTION INTRAVENOUS; SUBCUTANEOUS at 22:43

## 2023-08-20 RX ADMIN — LORAZEPAM 2 MG: 2 INJECTION INTRAMUSCULAR; INTRAVENOUS at 20:49

## 2023-08-20 RX ADMIN — CARVEDILOL 12.5 MG: 12.5 TABLET, FILM COATED ORAL at 09:44

## 2023-08-20 RX ADMIN — IOHEXOL 85 ML: 350 INJECTION, SOLUTION INTRAVENOUS at 17:44

## 2023-08-20 RX ADMIN — LIDOCAINE 5% 1 PATCH: 700 PATCH TOPICAL at 09:44

## 2023-08-20 RX ADMIN — LORAZEPAM 2 MG: 2 INJECTION INTRAMUSCULAR; INTRAVENOUS at 16:59

## 2023-08-20 RX ADMIN — HALOPERIDOL LACTATE 2 MG: 5 INJECTION, SOLUTION INTRAMUSCULAR at 14:52

## 2023-08-20 NOTE — PROGRESS NOTES
[address] Darl[address]all[address] NOTE   Giovani Marie 58 y.o. male MRN: [id number]  Unit/Bed#: 4 [place] 421-01 Encounter: [id number]  Reason for Consult: ESRD on HD     ASSESSMENT and PLAN:  79-year-old male admitted after a fall. We are consulted for management of ESRD on HD. 1. ESRD on HD  - Schedule: TTS at [facility] HD: He was dialyzed yesterday inpatient 08/19  - Access is right IJ permacath  - Next hemodialysis session will be Tuesday    2. HTN/Volume   - Medications: Blood pressure is currently controlled on nifedipine and Coreg at home doses  - UF: We will perform ultrafiltration on hemodialysis days, we will contact dialysis unit tomorrow to determine EDW  - We performed 1.6 L ultrafiltration on hemodialysis yesterday, postdialysis weight was 108.7 kg    3. Anemia   - Goal Hb 10-11, currently above goal  - No indication for SURINDER, we will try to obtain records for anemia management tomorrow    4. Electrolytes   - Mild hyponatremia due to lack of free water clearance in setting of ESRD  - Fluid restrict at 1500 cc per 24 hours    5. Secondary hyperparathyroidism  - Continue sevelamer with meals for hyperphosphatemia  - We will try to obtain records from outpatient dialysis unit to see if he is on any other medications such as calcitriol or Sensipar    6. Status post fall  - Presented with back pain and altered mental status  - Unclear etiology of altered mental status, work-up per primary team  - Patient has refused imaging studies    Discussed with internal medicine team.  After discussion, we agreed that patient is stable from a nephrology perspective for inter-dialytic period next hemodialysis session will be per schedule on Tuesday. SUBJECTIVE / 24H INTERVAL HISTORY:  Patient underwent hemodialysis yesterday with 1.6 L UF. He remains confused. He is only oriented to self.     REVIEW OF SYSTEMS:  Review of Systems   Unable to perform ROS: Mental status change OBJECTIVE:  Current Weight: Weight - Scale: 115 kg (253 lb 8.5 oz)  Vitals:    08/19/23 2010 08/19/23 2018 08/19/23 2207 08/20/23 0300   BP: 146/93 146/93 149/72 165/65   BP Location:       Pulse: 70 74 78 67   Resp: 18   20   Temp:  (!) 97.4 °F (36.3 °C) 98.3 °F (36.8 °C) 98.3 °F (36.8 °C)   TempSrc:       SpO2:  98% 93% 95%   Weight:       Height:           Intake/Output Summary (Last 24 hours) at 8/20/2023 0650  Last data filed at 8/19/2023 2010  Gross per 24 hour   Intake 700 ml   Output 2575 ml   Net -1875 ml     Physical Exam  Vitals and nursing note reviewed. Constitutional:       General: He is not in acute distress. Appearance: He is well-developed. HENT:      Head: Normocephalic and atraumatic. Eyes:      Conjunctiva/sclera: Conjunctivae normal.   Cardiovascular:      Rate and Rhythm: Normal rate and regular rhythm. Heart sounds: No murmur heard. Comments: Right chest wall permacath with healthy exit site and intact dressing  Pulmonary:      Effort: Pulmonary effort is normal. No respiratory distress. Breath sounds: Normal breath sounds. Abdominal:      Palpations: Abdomen is soft. Tenderness: There is no abdominal tenderness. Musculoskeletal:         General: No swelling. Cervical back: Neck supple. Right lower leg: No edema. Left lower leg: No edema. Skin:     General: Skin is warm and dry. Capillary Refill: Capillary refill takes less than 2 seconds. Neurological:      Mental Status: He is alert. He is disoriented.    Psychiatric:         Mood and Affect: Mood normal.       Medications:    Current Facility-Administered Medications:   •  acetaminophen (TYLENOL) tablet 975 mg, 975 mg, Oral, Q8H, Joel Dalton MD, 975 mg at 08/20/23 0609  •  atorvastatin (LIPITOR) tablet 80 mg, 80 mg, Oral, Daily, Marizol Flanagan MD, 80 mg at 08/19/23 1423  •  carvedilol (COREG) tablet 12.5 mg, 12.5 mg, Oral, BID, Marizol Flanagan MD  •  docusate sodium (COLACE) capsule 100 mg, 100 mg, Oral, BID, Aleks Lazcano MD  •  famotidine (PEPCID) tablet 10 mg, 10 mg, Oral, Daily, Aleks Lazcano MD, 10 mg at 08/19/23 1423  •  HYDROmorphone (DILAUDID) injection 1 mg, 1 mg, Intravenous, Q4H PRN, Aleks Lazcano MD, 1 mg at 08/19/23 2054  •  insulin lispro (HumaLOG) 100 units/mL subcutaneous injection 1-5 Units, 1-5 Units, Subcutaneous, HS, Aleks Lazcano MD  •  insulin lispro (HumaLOG) 100 units/mL subcutaneous injection 2-12 Units, 2-12 Units, Subcutaneous, TID AC **AND** Fingerstick Glucose (POCT), , , TID AC, Aleks Lazcano MD  •  lidocaine (LIDODERM) 5 % patch 1 patch, 1 patch, Topical, Daily, Aleks Lazcano MD, 1 patch at 08/19/23 1422  •  magnesium Oxide (MAG-OX) tablet 400 mg, 400 mg, Oral, BID, Aleks Lazcano MD, 400 mg at 08/19/23 1850  •  methocarbamol (ROBAXIN) tablet 500 mg, 500 mg, Oral, Q12H PRN, Aleks Lazcano MD, 500 mg at 08/19/23 1423  •  NIFEdipine (PROCARDIA XL) 24 hr tablet 30 mg, 30 mg, Oral, BID, Aleks Lazcano MD  •  ondansetron (ZOFRAN) injection 4 mg, 4 mg, Intravenous, Q6H PRN, Aleks Lazcano MD  •  oxyCODONE (ROXICODONE) IR tablet 5 mg, 5 mg, Oral, Q8H PRN, Aleks Lazcano MD, 5 mg at 08/19/23 1423  •  sevelamer (RENAGEL) tablet 800 mg, 800 mg, Oral, TID With Meals, Aleks Lazcano MD    Laboratory Results:  Results from last 7 days   Lab Units 08/20/23  0531 08/19/23  1053   WBC Thousand/uL 11.35* 14.01*   HEMOGLOBIN g/dL 11.7* 11.4*   HEMATOCRIT % 37.3 35.5*   PLATELETS Thousands/uL 211 213   POTASSIUM mmol/L 3.9 4.1   CHLORIDE mmol/L 94* 94*   CO2 mmol/L 31 32   BUN mg/dL 27* 38*   CREATININE mg/dL 5.15* 7.48*   CALCIUM mg/dL 9.3 10.2   MAGNESIUM mg/dL  --  1.8*   PHOSPHORUS mg/dL  --  1.8*       Portions of the record may have been created with voice recognition software.  Occasional wrong word or "sound a like" substitutions may have occurred due to the inherent limitations of voice recognition software. Read the chart carefully and recognize, using context, where substitutions have occurred. If you have any questions, please contact the dictating provider.

## 2023-08-20 NOTE — PROGRESS NOTES
[address] [employer]  Progress Note  Name: Laura Mehta  MRN: [id number]  Unit/Bed#: 913 [facility] 421-01 I Date of Admission: 8/19/2023   Date of Service: 8/20/2023 I Hospital Day: 1    Assessment/Plan   * Status post fall  Assessment & Plan  Patient sustained a fall about 2 days ago  Etiology not clear-EKG was unremarkable. Blood sugar was 157. Questionable hypoglycemia versus accidental fall. Patient not exhibiting any signs of stroke. Telemetry showed no evidence of arrhythmia  Patient does not remember how he fell. Patient was found on the floor and could not get up. We will get PT/OT evaluation and treatment  CAT scan of the brain, CT chest abdomen pelvis, cervical spine and lumbar reconstructions study was ordered in the ED but patient refused the studies    Altered mental status  Assessment & Plan  Patient has been confused and more agitated and not following commands and refusing all treatment today. Check urine drug screen  Neurology consult  CAT scan of the brain was ordered with Ativan and Haldol.     Low back pain  Assessment & Plan  Patient has history of compression fracture of the T10  Currently complaining of severe lower back pain but refusing CAT scan of the lumbar spine or any imaging  Pain control with Lidoderm, Tylenol, oxycodone and Dilaudid for breakthrough pain  PT/OT evaluation and treatment    ESRD (end stage renal disease) [facility])  Assessment & Plan  Lab Results   Component Value Date    EGFR 10 08/20/2023    EGFR 6 08/19/2023    EGFR 9 07/30/2022    CREATININE 5.15 (H) 08/20/2023    CREATININE 7.48 (H) 08/19/2023    CREATININE 5.57 (H) 07/30/2022   Patient is on Tuesday Thursday Saturday schedule at [address]  Last hemodialysis was on 8/17/2023  Patient underwent hemodialysis  on 8/19/2023  Patient has right IJ permacath    Electrolyte abnormality  Assessment & Plan  Sodium 134 with magnesium level of 1.8  Magnesium oxide 400 mg p.o. twice daily ordered    Leukocytosis  Assessment & Plan  White count elevated 14,000 but patient is afebrile  Chart review showed chronic elevation of white count  Likely stress response  White count has improved. Patient had low-grade fever today. Rhabdomyolysis  Assessment & Plan  After sustaining a fall  CK level mildly elevated at 1200 -564  CK level has improved after dialysis. Hypertension  Assessment & Plan  Continue Procardia 30 mg p.o. twice daily, Coreg 12.5 mg p.o. twice daily    Type 2 diabetes mellitus, without long-term current use of insulin [facility])  Assessment & Plan  Lab Results   Component Value Date    HGBA1C 6.6 (H) 07/07/2022       Recent Labs     08/19/23  1844 08/20/23  0700 08/20/23  1152 08/20/23  1550   POCGLU 107 120 112 143*       Blood Sugar Average: Last 72 hrs:  (P) 127.8   Patient is on linagliptin at home. Continue Humalog sliding scale with Accu-Cheks before every meal and at bedtime. Patient has been a diabetic diet               VTE Pharmacologic Prophylaxis: VTE Score: 2 Low Risk (Score 0-2) - Encourage Ambulation. Patient Centered Rounds: I performed bedside rounds with nursing staff today. Discussions with Specialists or Other Care Team Provider: Nephrology    Education and Discussions with Family / Patient: Updated  (friend) via phone. Total Time Spent on Date of Encounter in care of patient: 45 minutes This time was spent on one or more of the following: performing physical exam; counseling and coordination of care; obtaining or reviewing history; documenting in the medical record; reviewing/ordering tests, medications or procedures; communicating with other healthcare professionals and discussing with patient's family/caregivers.     Current Length of Stay: 1 day(s)  Current Patient Status: Inpatient   Certification Statement: The patient will continue to require additional inpatient hospital stay due to Altered mental status, electrolyte abnormality, leukocytosis  Discharge Plan: Anticipate discharge in 24-48 hrs to Pending course    Code Status: Level 1 - Full Code    Subjective:   Patient feeling very shaky and confused and agitated today. Not following commands. Patient was trying to pull off all the things. Objective:     Vitals:   Temp (24hrs), Av.3 °F (36.8 °C), Min:97.4 °F (36.3 °C), Max:99.9 °F (37.7 °C)    Temp:  [97.4 °F (36.3 °C)-99.9 °F (37.7 °C)] 97.9 °F (36.6 °C)  HR:  [] 70  Resp:  [18-20] 18  BP: ()/(40-93) 105/54  SpO2:  [93 %-98 %] 94 %  Body mass index is 33.45 kg/m². Input and Output Summary (last 24 hours): Intake/Output Summary (Last 24 hours) at 2023  Last data filed at 2023  Gross per 24 hour   Intake 700 ml   Output 2575 ml   Net -1875 ml       Physical Exam:   Physical Exam  Constitutional:       Appearance: Normal appearance. HENT:      Head: Normocephalic and atraumatic. Eyes:      Extraocular Movements: Extraocular movements intact. Pupils: Pupils are equal, round, and reactive to light. Cardiovascular:      Rate and Rhythm: Normal rate and regular rhythm. Heart sounds: No murmur heard. No gallop. Pulmonary:      Effort: Pulmonary effort is normal.      Breath sounds: Normal breath sounds. Abdominal:      General: Bowel sounds are normal.      Palpations: Abdomen is soft. Tenderness: There is no abdominal tenderness. Musculoskeletal:         General: No swelling or deformity. Normal range of motion. Cervical back: Normal range of motion and neck supple. Skin:     General: Skin is warm and dry. Neurological:      General: No focal deficit present. Mental Status: He is alert.        Additional Data:     Labs:  Results from last 7 days   Lab Units 23  0531   WBC Thousand/uL 11.35*   HEMOGLOBIN g/dL 11.7*   HEMATOCRIT % 37.3   PLATELETS Thousands/uL 211   NEUTROS PCT % 80*   LYMPHS PCT % 8*   MONOS PCT % 9   EOS PCT % 2     Results from last 7 days   Lab Units 08/20/23  0531 08/19/23  1053   SODIUM mmol/L 134* 134*   POTASSIUM mmol/L 3.9 4.1   CHLORIDE mmol/L 94* 94*   CO2 mmol/L 31 32   BUN mg/dL 27* 38*   CREATININE mg/dL 5.15* 7.48*   ANION GAP mmol/L 9 8   CALCIUM mg/dL 9.3 10.2   ALBUMIN g/dL  --  3.7   TOTAL BILIRUBIN mg/dL  --  0.68   ALK PHOS U/L  --  69   ALT U/L  --  14   AST U/L  --  35   GLUCOSE RANDOM mg/dL 98 136     Results from last 7 days   Lab Units 08/19/23  1053   INR  1.16     Results from last 7 days   Lab Units 08/20/23  1550 08/20/23  1152 08/20/23  0700 08/19/23  1844 08/19/23  1042   POC GLUCOSE mg/dl 143* 112 120 107 157*               Lines/Drains:  Invasive Devices     Peripheral Intravenous Line  Duration           Peripheral IV 08/20/23 Dorsal (posterior); Left Forearm <1 day          Hemodialysis Catheter  Duration           HD Permanent Double Catheter 570 days                  Telemetry:  Telemetry Orders (From admission, onward)             24 Hour Telemetry Monitoring  Continuous x 24 Hours (Telem)        Expiring   Question:  Reason for 24 Hour Telemetry  Answer:  Syncope suspected to be cardiac in origin                 Telemetry Reviewed: Normal Sinus Rhythm  Indication for Continued Telemetry Use: No indication for continued use. Will discontinue.               Imaging:     Recent Cultures (last 7 days):         Last 24 Hours Medication List:   Current Facility-Administered Medications   Medication Dose Route Frequency Provider Last Rate   • acetaminophen  650 mg Oral Q6H PRN Trang Rodriguez MD     • acetaminophen  975 mg Oral Q8H Trang Rodriguez MD     • atorvastatin  80 mg Oral Daily Trang Rodriguez MD     • carvedilol  12.5 mg Oral BID Trang Rodriguez MD     • docusate sodium  100 mg Oral BID Joel Dalton MD     • famotidine  10 mg Oral Daily Trang Rodriguez MD     • HYDROmorphone  1 mg Intravenous Q4H PRN Joel Dalton MD     • insulin lispro  1-5 Units Subcutaneous  Joel MD Sha     • insulin lispro  2-12 Units Subcutaneous TID AC Lily Carvalho MD     • lidocaine  1 patch Topical Daily Lily Carvalho MD     • LORazepam  0.5 mg Intravenous Q6H PRN Lily Carvalho MD     • magnesium Oxide  400 mg Oral BID Lily Carvalho MD     • methocarbamol  500 mg Oral Q12H PRN Lily Carvalho MD     • NIFEdipine  30 mg Oral BID Lily Carvalho MD     • ondansetron  4 mg Intravenous Q6H PRN Lily Carvalho MD     • oxyCODONE  5 mg Oral Q8H PRN Lily Carvalho MD     • sevelamer  800 mg Oral TID With Meals Lily Carvalho MD          Today, Patient Was Seen By: Lily Carvalho MD    **Please Note: This note may have been constructed using a voice recognition system. **

## 2023-08-20 NOTE — PLAN OF CARE
Problem: PHYSICAL THERAPY ADULT  Goal: Performs mobility at highest level of function for planned discharge setting. See evaluation for individualized goals. Description: Treatment/Interventions: ADL retraining, Functional transfer training, LE strengthening/ROM, Therapeutic exercise, Patient/family training, Bed mobility, Gait training, Spoke to nursing          See flowsheet documentation for full assessment, interventions and recommendations. Note:    Problem List: Decreased strength, Decreased endurance, Impaired balance, Decreased mobility, Decreased coordination, Pain  Assessment: Patient seen for Physical Therapy evaluation. Patient admitted with Status post fall. Comorbidities affecting patient's physical performance include: chronic back pain, diabetes, ESRD on hemodialysis and hypertension. Personal factors affecting patient at time of initial evaluation include: inability to ambulate household distances, inability to navigate community distances, positive fall history, inability to perform ADLS and inability to perform IADLS . Prior to admission, patient was independent with functional mobility without assistive device, independent with ADLS, independent with IADLS, ambulating household distance and ambulating community distances. Please find objective findings from Physical Therapy assessment regarding body systems outlined above with impairments and limitations including weakness, impaired balance, decreased endurance, gait deviations, pain, decreased activity tolerance, decreased functional mobility tolerance, fall risk and decreased skin integrity. The Barthel Index was used as a functional outcome tool presenting with a score of Barthel Index Score: 55 today indicating moderate limitations of functional mobility and ADLS.   Patient's clinical presentation is currently unstable/unpredictable as seen in patient's presentation of increased fall risk, new onset of impairment of functional mobility, decreased endurance and new onset of weakness. Pt would benefit from continued Physical Therapy treatment to address deficits as defined above and maximize level of functional mobility. As demonstrated by objective findings, the assigned level of complexity for this evaluation is high. The patient's -[facility] Basic Mobility Inpatient Short Form Raw Score is 16. A Raw score of less than or equal to 16 suggests the patient may benefit from discharge to post-acute rehabilitation services. Please also refer to the recommendation of the Physical Therapist for safe discharge planning. PT Discharge Recommendation: Home with outpatient rehabilitation    See flowsheet documentation for full assessment.

## 2023-08-20 NOTE — ASSESSMENT & PLAN NOTE
Lab Results   Component Value Date    HGBA1C 6.6 (H) 07/07/2022       Recent Labs     08/19/23  1844 08/20/23  0700 08/20/23  1152 08/20/23  1550   POCGLU 107 120 112 143*       Blood Sugar Average: Last 72 hrs:  (P) 127.8   Patient is on linagliptin at home. Continue Humalog sliding scale with Accu-Cheks before every meal and at bedtime.   Patient has been a diabetic diet

## 2023-08-20 NOTE — ASSESSMENT & PLAN NOTE
After sustaining a fall  CK level mildly elevated at 1200 -564  CK level has improved after dialysis.

## 2023-08-20 NOTE — PHYSICAL THERAPY NOTE
PT EVALUATION     Time In: 5460  Time Out: 1055       08/20/23 1040   PT Last Visit   PT Visit Date 08/20/23   Note Type   Note type Evaluation   Pain Assessment   Pain Assessment Tool 0-10   Pain Score 7   Pain Location/Orientation Orientation: Lower; Location: Back   Hospital Pain Intervention(s) Repositioned; Ambulation/increased activity; Emotional support   Restrictions/Precautions   Other Precautions Chair Alarm; Bed Alarm; Fall Risk;Pain   Home Living   Type of Home House   Home Layout Multi-level; Able to live on main level with bedroom/bathroom;Stairs to enter with rails   Bathroom Shower/Tub Walk-in shower   [address] Walker;Cane  (Has SPC and RW, but does not normally use)   Prior Function   Level of [place] Independent with ADLs; Independent with [facility] in the last 6 months 1 to 4   General   Additional Pertinent History Pt admitted to hospital following a fall with patient reporting "don't remember much," but guessing he was on the floor for 2 days. Pt discovered by daughter and then was brought to ED. Family/Caregiver Present No   Cognition   Overall Cognitive Status WFL   Orientation Level Oriented to person;Oriented to place; Disoriented to time;Disoriented to situation   Memory Decreased recall of recent events   Following Commands Follows one step commands without difficulty   Subjective   Subjective "My back seems to hurt"   RLE Assessment   RLE Assessment   (4/5 LE myotomes)   LLE Assessment   LLE Assessment   (4/5 LE myotomes)   Bed Mobility   Supine to Sit 3  Moderate assistance   Additional items Assist x 1; Increased time required;LE management   Sit to Supine 4  Minimal assistance   Additional items Assist x 1   Transfers   Sit to Stand 4  Minimal assistance   Additional items Assist x 1   Stand to Sit 4  Minimal assistance   Additional items Assist x 1   Ambulation/Elevation   Gait pattern Forward Flexion; Wide GARO; Short stride   Gait Assistance 4  Minimal assist   Additional items Assist x 1   Assistive Device Rolling walker   Distance 30 feet   Stair Management Assistance Not tested   Balance   Static Sitting Fair +   Dynamic Sitting Fair   Static Standing Fair -   Dynamic Standing Fair -   Ambulatory Poor +   Activity Tolerance   Activity Tolerance Patient tolerated treatment well;Patient limited by fatigue   Medical Staff Made Aware CURLY Segal   Assessment   Problem List Decreased strength;Decreased endurance; Impaired balance;Decreased mobility; Decreased coordination;Pain   Assessment Patient seen for Physical Therapy evaluation. Patient admitted with Status post fall. Comorbidities affecting patient's physical performance include: chronic back pain, diabetes, ESRD on hemodialysis and hypertension. Personal factors affecting patient at time of initial evaluation include: inability to ambulate household distances, inability to navigate community distances, positive fall history, inability to perform ADLS and inability to perform IADLS . Prior to admission, patient was independent with functional mobility without assistive device, independent with ADLS, independent with IADLS, ambulating household distance and ambulating community distances. Please find objective findings from Physical Therapy assessment regarding body systems outlined above with impairments and limitations including weakness, impaired balance, decreased endurance, gait deviations, pain, decreased activity tolerance, decreased functional mobility tolerance, fall risk and decreased skin integrity. The Barthel Index was used as a functional outcome tool presenting with a score of Barthel Index Score: 55 today indicating moderate limitations of functional mobility and ADLS.   Patient's clinical presentation is currently unstable/unpredictable as seen in patient's presentation of increased fall risk, new onset of impairment of functional mobility, decreased endurance and new onset of weakness. Pt would benefit from continued Physical Therapy treatment to address deficits as defined above and maximize level of functional mobility. As demonstrated by objective findings, the assigned level of complexity for this evaluation is high. The patient's [facility] Basic Mobility Inpatient Short Form Raw Score is 16. A Raw score of less than or equal to 16 suggests the patient may benefit from discharge to post-acute rehabilitation services. Please also refer to the recommendation of the Physical Therapist for safe discharge planning. Goals   STG Expiration Date 08/27/23   Short Term Goal #1 Pt will improve bilateral LE strength by 1/2 grade   Short Term Goal #2 Pt will be able to perform all transfers with supervision   LTG Expiration Date 09/03/23   Long Term Goal #1 Pt will be able to ambulate 150 feet supervision with RW at time of discharge   Long Term Goal #2 Pt will be able to negotiate stairs supervision at time of discharge. Plan   Treatment/Interventions ADL retraining;Functional transfer training;LE strengthening/ROM; Therapeutic exercise;Patient/family training;Bed mobility;Gait training;Spoke to nursing   PT Frequency   (5x/wk)   Recommendation   PT Discharge Recommendation Home with outpatient rehabilitation   [facility] Basic Mobility Inpatient   Turning in Flat Bed Without Bedrails 3   Lying on Back to Sitting on Edge of Flat Bed Without Bedrails 2   Moving Bed to Chair 3   Standing Up From Chair Using Arms 3   Walk in Room 3   Climb 3-5 Stairs With Railing 2   Basic Mobility Inpatient Raw Score 16   Basic Mobility Standardized Score 38.32   Highest Level Of Mobility   JH-HLM Goal 5: Stand one or more mins   JH-HLM Achieved 7: Walk 25 feet or more   Barthel Index   Feeding 10   Bathing 0   Grooming Score 5   Dressing Score 5   Bladder Score 10   Bowels Score 10   Toilet Use Score 5   Transfers (Bed/Chair) Score 10   Mobility (Level Surface) Score 0 Stairs Score 0   Barthel Index Score 55   End of Consult   Patient Position at End of Consult Supine;Bed/Chair alarm activated; All needs within reach   [id number] Number  [facility]ichReding[facility] Hospitalfarrukh Casiano PT, DPT [id number]

## 2023-08-20 NOTE — ASSESSMENT & PLAN NOTE
Patient has been confused and more agitated and not following commands and refusing all treatment today. Check urine drug screen  Neurology consult  Get CTA of the head and neck with Ativan. Discussed with patient's friend-no history of alcohol use or drug use  Check ammonia level and TSH  Patient might also need EEG, MRI based on further course.

## 2023-08-20 NOTE — PLAN OF CARE
Problem: Potential for Falls  Goal: Patient will remain free of falls  Description: INTERVENTIONS:  - Educate patient/family on patient safety including physical limitations  - Instruct patient to call for assistance with activity   - Consult OT/PT to assist with strengthening/mobility   - Keep Call bell within reach  - Keep bed low and locked with side rails adjusted as appropriate  - Keep care items and personal belongings within reach  - Initiate and maintain comfort rounds  - Make Fall Risk Sign visible to staff  - Offer Toileting every 2Hours, in advance of need  - Initiate/Maintain bed alarm  - Apply yellow socks and bracelet for high fall risk patients  - Consider moving patient to room near nurses station  8/20/2023 1820 by Ofelia Case RN  Outcome: Progressing  8/20/2023 1418 by Ofelia Case RN  Outcome: Progressing     Problem: PAIN - ADULT  Goal: Verbalizes/displays adequate comfort level or baseline comfort level  Description: Interventions:  - Encourage patient to monitor pain and request assistance  - Assess pain using appropriate pain scale  - Administer analgesics based on type and severity of pain and evaluate response  - Implement non-pharmacological measures as appropriate and evaluate response  - Consider cultural and social influences on pain and pain management  - Notify physician/advanced practitioner if interventions unsuccessful or patient reports new pain  8/20/2023 1820 by Ofelia Case RN  Outcome: Progressing  8/20/2023 1418 by Ofeila Case RN  Outcome: Progressing     Problem: INFECTION - ADULT  Goal: Absence or prevention of progression during hospitalization  Description: INTERVENTIONS:  - Assess and monitor for signs and symptoms of infection  - Monitor lab/diagnostic results  - Monitor all insertion sites, i.e. indwelling lines, tubes, and drains  - Monitor endotracheal if appropriate and nasal secretions for changes in amount and color  - [place] appropriate cooling/warming therapies per order  - Administer medications as ordered  - Instruct and encourage patient and family to use good hand hygiene technique  - Identify and instruct in appropriate isolation precautions for identified infection/condition  8/20/2023 1820 by Lidya Mcfarlane RN  Outcome: Progressing  8/20/2023 1418 by Lidya Mcfarlane RN  Outcome: Progressing     Problem: SAFETY ADULT  Goal: Patient will remain free of falls  Description: INTERVENTIONS:  - Educate patient/family on patient safety including physical limitations  - Instruct patient to call for assistance with activity   - Consult OT/PT to assist with strengthening/mobility   - Keep Call bell within reach  - Keep bed low and locked with side rails adjusted as appropriate  - Keep care items and personal belongings within reach  - Initiate and maintain comfort rounds  - Make Fall Risk Sign visible to staff  - Offer Toileting every 2 Hours, in advance of need  - Initiate/Maintain bed alarm  - Apply yellow socks and bracelet for high fall risk patients  - Consider moving patient to room near nurses station  8/20/2023 1820 by Lidya Mcfarlane RN  Outcome: Progressing  8/20/2023 1418 by Lidya Mcfarlane RN  Outcome: Progressing     Problem: DISCHARGE PLANNING  Goal: Discharge to home or other facility with appropriate resources  Description: INTERVENTIONS:  - Identify barriers to discharge w/patient and caregiver  - Arrange for needed discharge resources and transportation as appropriate  - Identify discharge learning needs (meds, wound care, etc.)  - Arrange for interpretive services to assist at discharge as needed  - Refer to Case Management Department for coordinating discharge planning if the patient needs post-hospital services based on physician/advanced practitioner order or complex needs related to functional status, cognitive ability, or social support system  Outcome: Progressing     Problem: Knowledge Deficit  Goal: Patient/family/caregiver demonstrates understanding of disease process, treatment plan, medications, and discharge instructions  Description: Complete learning assessment and assess knowledge base.   Interventions:  - Provide teaching at level of understanding  - Provide teaching via preferred learning methods  8/20/2023 1820 by Ivonne Hahn RN  Outcome: Progressing  8/20/2023 1418 by Ivonne Hahn RN  Outcome: Not Progressing     Problem: METABOLIC, FLUID AND ELECTROLYTES - ADULT  Goal: Electrolytes maintained within normal limits  Description: INTERVENTIONS:  - Monitor labs and assess patient for signs and symptoms of electrolyte imbalances  - Administer electrolyte replacement as ordered  - Monitor response to electrolyte replacements, including repeat lab results as appropriate  - Instruct patient on fluid and nutrition as appropriate  8/20/2023 1820 by Ivonne Hahn RN  Outcome: Progressing  8/20/2023 1418 by Ivonne Hahn RN  Outcome: Progressing  Goal: Fluid balance maintained  Description: INTERVENTIONS:  - Monitor labs   - Monitor I/O and WT  - Instruct patient on fluid and nutrition as appropriate  - Assess for signs & symptoms of volume excess or deficit  8/20/2023 1820 by Ivonne Hahn RN  Outcome: Progressing  8/20/2023 1418 by Ivonne Hahn RN  Outcome: Progressing     Problem: SAFETY,RESTRAINT: NV/NON-SELF DESTRUCTIVE BEHAVIOR  Goal: Remains free of harm/injury (restraint for non violent/non self-detsructive behavior)  Description: INTERVENTIONS:  - Instruct patient/family regarding restraint use   - Assess and monitor physiologic and psychological status   - Provide interventions and comfort measures to meet assessed patient needs   - Identify and implement measures to help patient regain control  - Assess readiness for release of restraint   8/20/2023 1820 by Ivonne Hahn RN  Outcome: Progressing  8/20/2023 1418 by Ivonne Hahn RN  Outcome: Progressing  Goal: Returns to optimal restraint-free functioning  Description: INTERVENTIONS:  - Assess the patient's behavior and symptoms that indicate continued need for restraint  - Identify and implement measures to help patient regain control  - Assess readiness for release of restraint   Outcome: Progressing

## 2023-08-20 NOTE — PLAN OF CARE
Problem: Potential for Falls  Goal: Patient will remain free of falls  Description: INTERVENTIONS:  - Educate patient/family on patient safety including physical limitations  - Instruct patient to call for assistance with activity   - Consult OT/PT to assist with strengthening/mobility   - Keep Call bell within reach  - Keep bed low and locked with side rails adjusted as appropriate  - Keep care items and personal belongings within reach  - Initiate and maintain comfort rounds  - Make Fall Risk Sign visible to staff  - Offer Toileting every 2  Hours, in advance of need  - Initiate/Maintain bed alarm  Problem: PAIN - ADULT  Goal: Verbalizes/displays adequate comfort level or baseline comfort level  Description: Interventions:  - Encourage patient to monitor pain and request assistance  - Assess pain using appropriate pain scale  - Administer analgesics based on type and severity of pain and evaluate response  - Implement non-pharmacological measures as appropriate and evaluate response  - Consider cultural and social influences on pain and pain management  - Notify physician/advanced practitioner if interventions unsuccessful or patient reports new pain  Outcome: Progressing     Problem: INFECTION - ADULT  Goal: Absence or prevention of progression during hospitalization  Description: INTERVENTIONS:  - Assess and monitor for signs and symptoms of infection  - Monitor lab/diagnostic results  - Monitor all insertion sites, i.e. indwelling lines, tubes, and drains  - Monitor endotracheal if appropriate and nasal secretions for changes in amount and color  - [place] appropriate cooling/warming therapies per order  - Administer medications as ordered  - Instruct and encourage patient and family to use good hand hygiene technique  - Identify and instruct in appropriate isolation precautions for identified infection/condition  Outcome: Progressing  Goal: Absence of fever/infection during neutropenic period  Description: INTERVENTIONS:  - Monitor WBC    Outcome: Progressing     Problem: DISCHARGE PLANNING  Goal: Discharge to home or other facility with appropriate resources  Description: INTERVENTIONS:  - Identify barriers to discharge w/patient and caregiver  - Arrange for needed discharge resources and transportation as appropriate  - Identify discharge learning needs (meds, wound care, etc.)  - Arrange for interpretive services to assist at discharge as needed  - Refer to Case Management Department for coordinating discharge planning if the patient needs post-hospital services based on physician/advanced practitioner order or complex needs related to functional status, cognitive ability, or social support system  Outcome: Progressing     Problem: Knowledge Deficit  Goal: Patient/family/caregiver demonstrates understanding of disease process, treatment plan, medications, and discharge instructions  Description: Complete learning assessment and assess knowledge base. Interventions:  - Provide teaching at level of understanding  - Provide teaching via preferred learning methods  Outcome: Progressing     Problem: SAFETY ADULT  Goal: Maintains/Returns to pre admission functional level  Description: INTERVENTIONS:  - Perform BMAT or MOVE assessment daily.   - Set and communicate daily mobility goal to care team and patient/family/caregiver. - Collaborate with rehabilitation services on mobility goals if consulted  - Perform Range of Motion 3  times a day.   - Dangle patient 3  times a day  - Out of btand patient 3  times a day  - Out of bed for meals 3  times a day  - Out of bed for toileting  - Record patient progress and toleration of activity level   Outcome: Progressing     - Apply yellow socks and bracelet for high fall risk patients  - Consider moving patient to room near nurses station  Outcome: Progressing

## 2023-08-20 NOTE — ASSESSMENT & PLAN NOTE
Patient sustained a fall about 2 days ago  Etiology not clear-EKG was unremarkable. Blood sugar was 157. Questionable hypoglycemia versus accidental fall. Patient not exhibiting any signs of stroke. Telemetry showed no evidence of arrhythmia  Patient does not remember how he fell. Patient was found on the floor and could not get up.   We will get PT/OT evaluation and treatment  CAT scan of the brain, CT chest abdomen pelvis, cervical spine and lumbar reconstructions study was ordered in the ED but patient refused the studies

## 2023-08-20 NOTE — ASSESSMENT & PLAN NOTE
Lab Results   Component Value Date    EGFR 10 08/20/2023    EGFR 6 08/19/2023    EGFR 9 07/30/2022    CREATININE 5.15 (H) 08/20/2023    CREATININE 7.48 (H) 08/19/2023    CREATININE 5.57 (H) 07/30/2022   Patient is on Tuesday Thursday Saturday schedule at [address]  Last hemodialysis was on 8/17/2023  Patient underwent hemodialysis  on 8/19/2023  Patient has right IJ permacath

## 2023-08-20 NOTE — PLAN OF CARE
Problem: Potential for Falls  Goal: Patient will remain free of falls  Description: INTERVENTIONS:  - Educate patient/family on patient safety including physical limitations  - Instruct patient to call for assistance with activity   - Consult OT/PT to assist with strengthening/mobility   - Keep Call bell within reach  - Keep bed low and locked with side rails adjusted as appropriate  - Keep care items and personal belongings within reach  - Initiate and maintain comfort rounds  - Make Fall Risk Sign visible to staff  - Offer Toileting every 2 Hours, in advance of need  - Initiate/Maintain bed alarm  - Apply yellow socks and bracelet for high fall risk patients  - Consider moving patient to room near nurses station  Outcome: Progressing     Problem: MOBILITY - ADULT  Goal: Maintain or return to baseline ADL function  Description: INTERVENTIONS:  -  Assess patient's ability to carry out ADLs; assess patient's baseline for ADL function and identify physical deficits which impact ability to perform ADLs (bathing, care of mouth/teeth, toileting, grooming, dressing, etc.)  - Assess/evaluate cause of self-care deficits   - Assess range of motion  - Assess patient's mobility; develop plan if impaired  - Assess patient's need for assistive devices and provide as appropriate  - Encourage maximum independence but intervene and supervise when necessary  - Involve family in performance of ADLs  - Assess for home care needs following discharge   - Consider OT consult to assist with ADL evaluation and planning for discharge  - Provide patient education as appropriate  Outcome: Progressing     Problem: PAIN - ADULT  Goal: Verbalizes/displays adequate comfort level or baseline comfort level  Description: Interventions:  - Encourage patient to monitor pain and request assistance  - Assess pain using appropriate pain scale  - Administer analgesics based on type and severity of pain and evaluate response  - Implement non-pharmacological measures as appropriate and evaluate response  - Consider cultural and social influences on pain and pain management  - Notify physician/advanced practitioner if interventions unsuccessful or patient reports new pain  Outcome: Progressing     Problem: INFECTION - ADULT  Goal: Absence or prevention of progression during hospitalization  Description: INTERVENTIONS:  - Assess and monitor for signs and symptoms of infection  - Monitor lab/diagnostic results  - Monitor all insertion sites, i.e. indwelling lines, tubes, and drains  - Monitor endotracheal if appropriate and nasal secretions for changes in amount and color  - [place] appropriate cooling/warming therapies per order  - Administer medications as ordered  - Instruct and encourage patient and family to use good hand hygiene technique  - Identify and instruct in appropriate isolation precautions for identified infection/condition  Outcome: Progressing     Problem: SAFETY ADULT  Goal: Patient will remain free of falls  Description: INTERVENTIONS:  - Educate patient/family on patient safety including physical limitations  - Instruct patient to call for assistance with activity   - Consult OT/PT to assist with strengthening/mobility   - Keep Call bell within reach  - Keep bed low and locked with side rails adjusted as appropriate  - Keep care items and personal belongings within reach  - Initiate and maintain comfort rounds  - Make Fall Risk Sign visible to staff  - Offer Toileting every 2 Hours, in advance of need  - Initiate/Maintain bed alarm  - Apply yellow socks and bracelet for high fall risk patients  - Consider moving patient to room near nurses station  Outcome: Progressing  Goal: Maintain or return to baseline ADL function  Description: INTERVENTIONS:  -  Assess patient's ability to carry out ADLs; assess patient's baseline for ADL function and identify physical deficits which impact ability to perform ADLs (bathing, care of mouth/teeth, toileting, grooming, dressing, etc.)  - Assess/evaluate cause of self-care deficits   - Assess range of motion  - Assess patient's mobility; develop plan if impaired  - Assess patient's need for assistive devices and provide as appropriate  - Encourage maximum independence but intervene and supervise when necessary  - Involve family in performance of ADLs  - Assess for home care needs following discharge   - Consider OT consult to assist with ADL evaluation and planning for discharge  - Provide patient education as appropriate  Outcome: Progressing     Problem: METABOLIC, FLUID AND ELECTROLYTES - ADULT  Goal: Electrolytes maintained within normal limits  Description: INTERVENTIONS:  - Monitor labs and assess patient for signs and symptoms of electrolyte imbalances  - Administer electrolyte replacement as ordered  - Monitor response to electrolyte replacements, including repeat lab results as appropriate  - Instruct patient on fluid and nutrition as appropriate  Outcome: Progressing  Goal: Fluid balance maintained  Description: INTERVENTIONS:  - Monitor labs   - Monitor I/O and WT  - Instruct patient on fluid and nutrition as appropriate  - Assess for signs & symptoms of volume excess or deficit  Outcome: Progressing     Problem: SAFETY,RESTRAINT: NV/NON-SELF DESTRUCTIVE BEHAVIOR  Goal: Remains free of harm/injury (restraint for non violent/non self-detsructive behavior)  Description: INTERVENTIONS:  - Instruct patient/family regarding restraint use   - Assess and monitor physiologic and psychological status   - Provide interventions and comfort measures to meet assessed patient needs   - Identify and implement measures to help patient regain control  - Assess readiness for release of restraint   Outcome: Progressing

## 2023-08-20 NOTE — ASSESSMENT & PLAN NOTE
White count elevated 14,000 but patient is afebrile  Chart review showed chronic elevation of white count  Likely stress response  White count has improved. Patient had low-grade fever today.

## 2023-08-21 ENCOUNTER — APPOINTMENT (INPATIENT)
Dept: RADIOLOGY | Facility: HOSPITAL | Age: 63
DRG: 474 | End: 2023-08-21
Payer: MEDICARE

## 2023-08-21 PROBLEM — T79.6XXA TRAUMATIC RHABDOMYOLYSIS (HCC): Status: ACTIVE | Noted: 2023-08-19

## 2023-08-21 PROBLEM — S91.301A OPEN WOUND OF RIGHT FOOT: Status: ACTIVE | Noted: 2023-08-21

## 2023-08-21 LAB
AMMONIA PLAS-SCNC: 18 UMOL/L (ref 18–72)
ANION GAP SERPL CALCULATED.3IONS-SCNC: 12 MMOL/L
ATRIAL RATE: 69 BPM
BASOPHILS # BLD AUTO: 0.05 THOUSANDS/ÂΜL (ref 0–0.1)
BASOPHILS NFR BLD AUTO: 1 % (ref 0–1)
BUN SERPL-MCNC: 49 MG/DL (ref 5–25)
CALCIUM SERPL-MCNC: 8.8 MG/DL (ref 8.4–10.2)
CHLORIDE SERPL-SCNC: 94 MMOL/L (ref 96–108)
CO2 SERPL-SCNC: 28 MMOL/L (ref 21–32)
CREAT SERPL-MCNC: 7.5 MG/DL (ref 0.6–1.3)
EOSINOPHIL # BLD AUTO: 0.07 THOUSAND/ÂΜL (ref 0–0.61)
EOSINOPHIL NFR BLD AUTO: 1 % (ref 0–6)
ERYTHROCYTE [DISTWIDTH] IN BLOOD BY AUTOMATED COUNT: 16.1 % (ref 11.6–15.1)
GFR SERPL CREATININE-BSD FRML MDRD: 6 ML/MIN/1.73SQ M
GLUCOSE SERPL-MCNC: 134 MG/DL (ref 65–140)
GLUCOSE SERPL-MCNC: 134 MG/DL (ref 65–140)
GLUCOSE SERPL-MCNC: 137 MG/DL (ref 65–140)
GLUCOSE SERPL-MCNC: 160 MG/DL (ref 65–140)
GLUCOSE SERPL-MCNC: 175 MG/DL (ref 65–140)
HCT VFR BLD AUTO: 37 % (ref 36.5–49.3)
HGB BLD-MCNC: 11.8 G/DL (ref 12–17)
IMM GRANULOCYTES # BLD AUTO: 0.02 THOUSAND/UL (ref 0–0.2)
IMM GRANULOCYTES NFR BLD AUTO: 0 % (ref 0–2)
LYMPHOCYTES # BLD AUTO: 0.5 THOUSANDS/ÂΜL (ref 0.6–4.47)
LYMPHOCYTES NFR BLD AUTO: 7 % (ref 14–44)
MCH RBC QN AUTO: 29.5 PG (ref 26.8–34.3)
MCHC RBC AUTO-ENTMCNC: 31.9 G/DL (ref 31.4–37.4)
MCV RBC AUTO: 93 FL (ref 82–98)
MONOCYTES # BLD AUTO: 0.67 THOUSAND/ÂΜL (ref 0.17–1.22)
MONOCYTES NFR BLD AUTO: 9 % (ref 4–12)
MRSA NOSE QL CULT: NORMAL
NEUTROPHILS # BLD AUTO: 6.11 THOUSANDS/ÂΜL (ref 1.85–7.62)
NEUTS SEG NFR BLD AUTO: 82 % (ref 43–75)
NRBC BLD AUTO-RTO: 0 /100 WBCS
P AXIS: 21 DEGREES
PLATELET # BLD AUTO: 195 THOUSANDS/UL (ref 149–390)
PMV BLD AUTO: 10.6 FL (ref 8.9–12.7)
POTASSIUM SERPL-SCNC: 4.1 MMOL/L (ref 3.5–5.3)
PR INTERVAL: 164 MS
QRS AXIS: -8 DEGREES
QRSD INTERVAL: 82 MS
QT INTERVAL: 456 MS
QTC INTERVAL: 488 MS
RBC # BLD AUTO: 4 MILLION/UL (ref 3.88–5.62)
SODIUM SERPL-SCNC: 134 MMOL/L (ref 135–147)
T WAVE AXIS: 37 DEGREES
TSH SERPL DL<=0.05 MIU/L-ACNC: 2.12 UIU/ML (ref 0.45–4.5)
VENTRICULAR RATE: 69 BPM
WBC # BLD AUTO: 7.42 THOUSAND/UL (ref 4.31–10.16)

## 2023-08-21 PROCEDURE — 87154 CUL TYP ID BLD PTHGN 6+ TRGT: CPT | Performed by: INTERNAL MEDICINE

## 2023-08-21 PROCEDURE — 99223 1ST HOSP IP/OBS HIGH 75: CPT | Performed by: PODIATRIST

## 2023-08-21 PROCEDURE — 87186 SC STD MICRODIL/AGAR DIL: CPT | Performed by: INTERNAL MEDICINE

## 2023-08-21 PROCEDURE — 82140 ASSAY OF AMMONIA: CPT | Performed by: INTERNAL MEDICINE

## 2023-08-21 PROCEDURE — 87070 CULTURE OTHR SPECIMN AEROBIC: CPT | Performed by: PODIATRIST

## 2023-08-21 PROCEDURE — 11043 DBRDMT MUSC&/FSCA 1ST 20/<: CPT | Performed by: PODIATRIST

## 2023-08-21 PROCEDURE — 87077 CULTURE AEROBIC IDENTIFY: CPT | Performed by: PODIATRIST

## 2023-08-21 PROCEDURE — 87040 BLOOD CULTURE FOR BACTERIA: CPT | Performed by: INTERNAL MEDICINE

## 2023-08-21 PROCEDURE — 87205 SMEAR GRAM STAIN: CPT | Performed by: PODIATRIST

## 2023-08-21 PROCEDURE — 73620 X-RAY EXAM OF FOOT: CPT

## 2023-08-21 PROCEDURE — 73630 X-RAY EXAM OF FOOT: CPT

## 2023-08-21 PROCEDURE — 87185 SC STD ENZYME DETCJ PER NZM: CPT | Performed by: INTERNAL MEDICINE

## 2023-08-21 PROCEDURE — 87186 SC STD MICRODIL/AGAR DIL: CPT | Performed by: PODIATRIST

## 2023-08-21 PROCEDURE — 99223 1ST HOSP IP/OBS HIGH 75: CPT | Performed by: PSYCHIATRY & NEUROLOGY

## 2023-08-21 PROCEDURE — 93005 ELECTROCARDIOGRAM TRACING: CPT

## 2023-08-21 PROCEDURE — 71045 X-RAY EXAM CHEST 1 VIEW: CPT

## 2023-08-21 PROCEDURE — 93010 ELECTROCARDIOGRAM REPORT: CPT | Performed by: INTERNAL MEDICINE

## 2023-08-21 PROCEDURE — 87076 CULTURE ANAEROBE IDENT EACH: CPT | Performed by: INTERNAL MEDICINE

## 2023-08-21 PROCEDURE — 82948 REAGENT STRIP/BLOOD GLUCOSE: CPT

## 2023-08-21 PROCEDURE — 87077 CULTURE AEROBIC IDENTIFY: CPT | Performed by: INTERNAL MEDICINE

## 2023-08-21 PROCEDURE — 80048 BASIC METABOLIC PNL TOTAL CA: CPT | Performed by: INTERNAL MEDICINE

## 2023-08-21 PROCEDURE — 99232 SBSQ HOSP IP/OBS MODERATE 35: CPT | Performed by: STUDENT IN AN ORGANIZED HEALTH CARE EDUCATION/TRAINING PROGRAM

## 2023-08-21 PROCEDURE — 97597 DBRDMT OPN WND 1ST 20 CM/<: CPT | Performed by: PODIATRIST

## 2023-08-21 PROCEDURE — 85025 COMPLETE CBC W/AUTO DIFF WBC: CPT | Performed by: INTERNAL MEDICINE

## 2023-08-21 PROCEDURE — 84443 ASSAY THYROID STIM HORMONE: CPT | Performed by: INTERNAL MEDICINE

## 2023-08-21 PROCEDURE — 99233 SBSQ HOSP IP/OBS HIGH 50: CPT | Performed by: INTERNAL MEDICINE

## 2023-08-21 RX ORDER — HEPARIN SODIUM 5000 [USP'U]/ML
5000 INJECTION, SOLUTION INTRAVENOUS; SUBCUTANEOUS EVERY 8 HOURS SCHEDULED
Status: DISCONTINUED | OUTPATIENT
Start: 2023-08-21 | End: 2023-09-06 | Stop reason: HOSPADM

## 2023-08-21 RX ADMIN — LORAZEPAM 0.5 MG: 2 INJECTION INTRAMUSCULAR; INTRAVENOUS at 21:25

## 2023-08-21 RX ADMIN — ACETAMINOPHEN 975 MG: 325 TABLET ORAL at 15:44

## 2023-08-21 RX ADMIN — CARVEDILOL 12.5 MG: 12.5 TABLET, FILM COATED ORAL at 09:50

## 2023-08-21 RX ADMIN — ATORVASTATIN CALCIUM 80 MG: 80 TABLET, FILM COATED ORAL at 09:50

## 2023-08-21 RX ADMIN — ACETAMINOPHEN 975 MG: 325 TABLET ORAL at 23:15

## 2023-08-21 RX ADMIN — Medication 400 MG: at 17:29

## 2023-08-21 RX ADMIN — LIDOCAINE 5% 1 PATCH: 700 PATCH TOPICAL at 09:49

## 2023-08-21 RX ADMIN — ACETAMINOPHEN 975 MG: 325 TABLET ORAL at 05:48

## 2023-08-21 RX ADMIN — INSULIN LISPRO 2 UNITS: 100 INJECTION, SOLUTION INTRAVENOUS; SUBCUTANEOUS at 17:26

## 2023-08-21 RX ADMIN — SEVELAMER HYDROCHLORIDE 800 MG: 800 TABLET ORAL at 17:10

## 2023-08-21 RX ADMIN — FAMOTIDINE 10 MG: 20 TABLET, FILM COATED ORAL at 09:50

## 2023-08-21 RX ADMIN — CARVEDILOL 12.5 MG: 12.5 TABLET, FILM COATED ORAL at 17:29

## 2023-08-21 RX ADMIN — DOCUSATE SODIUM 100 MG: 100 CAPSULE, LIQUID FILLED ORAL at 17:29

## 2023-08-21 RX ADMIN — NIFEDIPINE 30 MG: 30 TABLET, EXTENDED RELEASE ORAL at 17:29

## 2023-08-21 RX ADMIN — Medication 400 MG: at 09:50

## 2023-08-21 RX ADMIN — LORAZEPAM 0.5 MG: 2 INJECTION INTRAMUSCULAR; INTRAVENOUS at 03:11

## 2023-08-21 RX ADMIN — INSULIN LISPRO 1 UNITS: 100 INJECTION, SOLUTION INTRAVENOUS; SUBCUTANEOUS at 21:27

## 2023-08-21 RX ADMIN — HEPARIN SODIUM 5000 UNITS: 5000 INJECTION INTRAVENOUS; SUBCUTANEOUS at 15:44

## 2023-08-21 RX ADMIN — HEPARIN SODIUM 5000 UNITS: 5000 INJECTION INTRAVENOUS; SUBCUTANEOUS at 21:27

## 2023-08-21 RX ADMIN — NIFEDIPINE 30 MG: 30 TABLET, EXTENDED RELEASE ORAL at 09:50

## 2023-08-21 RX ADMIN — DOCUSATE SODIUM 100 MG: 100 CAPSULE, LIQUID FILLED ORAL at 09:50

## 2023-08-21 NOTE — ASSESSMENT & PLAN NOTE
Patient has been confused and more agitated and not following commands and refusing all treatment today. Pending urine drug screen  Neurology consult pending at the current time-based on further course might need to consider MRI of the brain 2. Patient has known history of TIAs  CT of the head and neck with no acute intracranial abnormality with tiny hypodensity in the right posterior inferior putamen likely chronic lacunar infarct dilated prevascular space. Negative CTA for large vessel occlusion dissection or aneurysm with possible tracheobronchomalacia  Discussed with patient's friend-no history of alcohol use or drug use  Ammonia and TSH level were normal  Patient continues to remain intermittently agitated, confused, not following commands  Psychiatry was consulted-recommending Haldol 2 mg p.o. every 4 hours as needed for agitation at 2 mg IM as needed for severe agitation. We will get baseline EKG for QTc  Started septic work-up also as patient noted to have deep left foot wound-patient has been afebrile and had white count initially which has since normalized.   Patient's friend reported similar episode of confusion and agitation and hospitalization for an infection about 6 months  Suspect most symptoms 2/2 infection see below  F/U B12, folate, tsh, ammonia, tsh

## 2023-08-21 NOTE — PROGRESS NOTES
NEPHROLOGY PROGRESS NOTE   Ashton Bold 58 y.o. male MRN: [id number]  Unit/Bed#: [address] [place] [address] Encounter: [id number]  Reason for Consult: Management of ESRD    ASSESSMENT AND PLAN:  57 yo man with PMH of ESRD on HD TTS p/w AMS. Nephrology is consulted for management of ESRD     PLAN:    #ESRD on HD TTS:  · Dialysis unit/days: [facility]  · Access: Right IJ PermCath  · Last dialysis Saturday, plan for HD tomorrow  · Fluid restriction 1-1.5L/d  · Adjust medications to GFR<10  · Avoid opioids     #Volume status/hypertension:  · Volume: Appears euvolemic  · Blood pressure: Hypertensive, /61  · Low-sodium die  · Coreg 12.5  · Nifedipine 30 mg twice daily    #Secondary Hyperparasitoidism   · On sevelamer only with meals    # Anemia of Kidney Disease  · Current hemoglobin: 11.8 mg/dL  · At goal  · Treatment:  · Transfuse for hemoglobin less than 7.0 per primary service         #AMS  · Unclear etiology   · patient is a chronic HD on TTS  · Low suspicious of uremic symptoms with ongoing dialysis    Discussed with Dr. Сергей Palmer,  We agreed to continue HD as per schedule. Next Allises tomorrow    SUBJECTIVE:  Patient seen and examined at bedside.   Patient was agitated overnight received Ativan    OBJECTIVE:  Current Weight: Weight - Scale: 115 kg (253 lb 8.5 oz)  Vitals:    08/21/23 0727   BP: 157/61   Pulse: 79   Resp: 18   Temp: 98.5 °F (36.9 °C)   SpO2: 92%       Intake/Output Summary (Last 24 hours) at 8/21/2023 1034  Last data filed at 8/20/2023 2100  Gross per 24 hour   Intake 240 ml   Output --   Net 240 ml     Wt Readings from Last 3 Encounters:   08/19/23 115 kg (253 lb 8.5 oz)   07/31/22 102 kg (225 lb 6.4 oz)   07/06/22 106 kg (233 lb 11 oz)     Temp Readings from Last 3 Encounters:   08/21/23 98.5 °F (36.9 °C) (Axillary)   07/31/22 98.4 °F (36.9 °C) (Temporal)   07/13/22 (!) 97 °F (36.1 °C) (Oral)     BP Readings from Last 3 Encounters:   08/21/23 157/61   07/31/22 118/60   07/14/22 139/52     Pulse Readings from Last 3 Encounters:   08/21/23 79   07/31/22 61   07/14/22 65        General:  no acute distress at this time  Skin:  No acute rash  Eyes:  No scleral icterus and noninjected  ENT:  mucous membranes moist  Neck:  no carotid bruits  Chest:  Clear to auscultation percussion, good respiratory effort, no use of accessory respiratory muscles  CVS:  Regular rate and rhythm without rub   Abdomen:  soft and nontender   Extremities: no significant lower extremity edema  Neuro: Unable to assess due to AMS  Psych: Somnolent  Vascular access: PermCath      Medications:    Current Facility-Administered Medications:   •  acetaminophen (TYLENOL) tablet 650 mg, 650 mg, Oral, Q6H PRN, Melva Rob MD, 650 mg at 08/20/23 1141  •  acetaminophen (TYLENOL) tablet 975 mg, 975 mg, Oral, Q8H, Joel Dalton MD, 975 mg at 08/21/23 0548  •  atorvastatin (LIPITOR) tablet 80 mg, 80 mg, Oral, Daily, Melva Rob MD, 80 mg at 08/21/23 0950  •  carvedilol (COREG) tablet 12.5 mg, 12.5 mg, Oral, BID, Melva Rob MD, 12.5 mg at 08/21/23 0950  •  docusate sodium (COLACE) capsule 100 mg, 100 mg, Oral, BID, Joel Dalton MD, 100 mg at 08/21/23 0950  •  famotidine (PEPCID) tablet 10 mg, 10 mg, Oral, Daily, Joel Dalton MD, 10 mg at 08/21/23 0950  •  HYDROmorphone (DILAUDID) injection 1 mg, 1 mg, Intravenous, Q4H PRN, Melva Rob MD, 1 mg at 08/19/23 2054  •  insulin lispro (HumaLOG) 100 units/mL subcutaneous injection 1-5 Units, 1-5 Units, Subcutaneous, HS, Joel Dalton MD, 1 Units at 08/20/23 2243  •  insulin lispro (HumaLOG) 100 units/mL subcutaneous injection 2-12 Units, 2-12 Units, Subcutaneous, TID AC **AND** Fingerstick Glucose (POCT), , , TID AC, Joel Dalton MD  •  lidocaine (LIDODERM) 5 % patch 1 patch, 1 patch, Topical, Daily, Joel Dalton MD, 1 patch at 08/21/23 0949  •  LORazepam (ATIVAN) injection 0.5 mg, 0.5 mg, Intravenous, Q6H PRN, González Dalton MD, 0.5 mg at 08/21/23 0311  •  magnesium Oxide (MAG-OX) tablet 400 mg, 400 mg, Oral, BID, Dudley Jaimes MD, 400 mg at 08/21/23 0950  •  methocarbamol (ROBAXIN) tablet 500 mg, 500 mg, Oral, Q12H PRN, Dudley Jaimes MD, 500 mg at 08/19/23 1423  •  NIFEdipine (PROCARDIA XL) 24 hr tablet 30 mg, 30 mg, Oral, BID, Dudley Jaimes MD, 30 mg at 08/21/23 0950  •  ondansetron (ZOFRAN) injection 4 mg, 4 mg, Intravenous, Q6H PRN, Dudley Jaimes MD  •  oxyCODONE (ROXICODONE) IR tablet 5 mg, 5 mg, Oral, Q8H PRN, Dudley Jaimes MD, 5 mg at 08/19/23 1423  •  sevelamer (RENAGEL) tablet 800 mg, 800 mg, Oral, TID With Meals, Dudley Jaimes MD    Laboratory Results:  Results from last 7 days   Lab Units 08/21/23  0546 08/20/23  0531 08/19/23  1053   WBC Thousand/uL 7.42 11.35* 14.01*   HEMOGLOBIN g/dL 11.8* 11.7* 11.4*   HEMATOCRIT % 37.0 37.3 35.5*   PLATELETS Thousands/uL 195 211 213   SODIUM mmol/L 134* 134* 134*   POTASSIUM mmol/L 4.1 3.9 4.1   CHLORIDE mmol/L 94* 94* 94*   CO2 mmol/L 28 31 32   BUN mg/dL 49* 27* 38*   CREATININE mg/dL 7.50* 5.15* 7.48*   CALCIUM mg/dL 8.8 9.3 10.2   MAGNESIUM mg/dL  --  2.0 1.8*   PHOSPHORUS mg/dL  --   --  1.8*       CTA head and neck w wo contrast   Final Result by Philomena Campoverde MD (08/20 2054)      No acute intracranial abnormality. Tiny hypodensity right posterior inferior putamen, likely chronic lacunar infarct or dilated perivascular space. Negative CTA head and neck for large vessel occlusion, dissection, aneurysm, or high-grade stenosis. Possible tracheobronchomalacia. Additional chronic/incidental findings as detailed above. The study was marked in [facility] for immediate notification. Workstation performed: [id number]             Portions of the record may have been created with voice recognition software.  Occasional wrong word or "sound a like" substitutions may have occurred due to the inherent limitations of voice recognition software. Read the chart carefully and recognize, using context, where substitutions have occurred.

## 2023-08-21 NOTE — PLAN OF CARE
Problem: Potential for Falls  Goal: Patient will remain free of falls  Description: INTERVENTIONS:  - Educate patient/family on patient safety including physical limitations  - Instruct patient to call for assistance with activity   - Consult OT/PT to assist with strengthening/mobility   - Keep Call bell within reach  - Keep bed low and locked with side rails adjusted as appropriate  - Keep care items and personal belongings within reach  - Initiate and maintain comfort rounds  - Make Fall Risk Sign visible to staff  - Offer Toileting every 2 Hours, in advance of need  - Initiate/Maintain bed alarm  - Apply yellow socks and bracelet for high fall risk patients  - Consider moving patient to room near nurses station  Outcome: Progressing     Problem: MOBILITY - ADULT  Goal: Maintain or return to baseline ADL function  Description: INTERVENTIONS:  -  Assess patient's ability to carry out ADLs; assess patient's baseline for ADL function and identify physical deficits which impact ability to perform ADLs (bathing, care of mouth/teeth, toileting, grooming, dressing, etc.)  - Assess/evaluate cause of self-care deficits   - Assess range of motion  - Assess patient's mobility; develop plan if impaired  - Assess patient's need for assistive devices and provide as appropriate  - Encourage maximum independence but intervene and supervise when necessary  - Involve family in performance of ADLs  - Assess for home care needs following discharge   - Consider OT consult to assist with ADL evaluation and planning for discharge  - Provide patient education as appropriate  Outcome: Progressing  Goal: Maintains/Returns to pre admission functional level  Description: INTERVENTIONS:  - Perform BMAT or MOVE assessment daily.   - Set and communicate daily mobility goal to care team and patient/family/caregiver.    - Collaborate with rehabilitation services on mobility goals if consulted  - Out of bed for meals 3 times a day  - Out of bed for toileting  - Record patient progress and toleration of activity level   Outcome: Progressing     Problem: PAIN - ADULT  Goal: Verbalizes/displays adequate comfort level or baseline comfort level  Description: Interventions:  - Encourage patient to monitor pain and request assistance  - Assess pain using appropriate pain scale  - Administer analgesics based on type and severity of pain and evaluate response  - Implement non-pharmacological measures as appropriate and evaluate response  - Consider cultural and social influences on pain and pain management  - Notify physician/advanced practitioner if interventions unsuccessful or patient reports new pain  Outcome: Progressing     Problem: INFECTION - ADULT  Goal: Absence or prevention of progression during hospitalization  Description: INTERVENTIONS:  - Assess and monitor for signs and symptoms of infection  - Monitor lab/diagnostic results  - Monitor all insertion sites, i.e. indwelling lines, tubes, and drains  - Monitor endotracheal if appropriate and nasal secretions for changes in amount and color  - [place] appropriate cooling/warming therapies per order  - Administer medications as ordered  - Instruct and encourage patient and family to use good hand hygiene technique  - Identify and instruct in appropriate isolation precautions for identified infection/condition  Outcome: Progressing  Goal: Absence of fever/infection during neutropenic period  Description: INTERVENTIONS:  - Monitor WBC    Outcome: Progressing     Problem: SAFETY ADULT  Goal: Patient will remain free of falls  Description: INTERVENTIONS:  - Educate patient/family on patient safety including physical limitations  - Instruct patient to call for assistance with activity   - Consult OT/PT to assist with strengthening/mobility   - Keep Call bell within reach  - Keep bed low and locked with side rails adjusted as appropriate  - Keep care items and personal belongings within reach  - Initiate and maintain comfort rounds  - Make Fall Risk Sign visible to staff  - Offer Toileting every 2 Hours, in advance of need  - Initiate/Maintain bed alarm  - Apply yellow socks and bracelet for high fall risk patients  - Consider moving patient to room near nurses station  Outcome: Progressing  Goal: Maintain or return to baseline ADL function  Description: INTERVENTIONS:  -  Assess patient's ability to carry out ADLs; assess patient's baseline for ADL function and identify physical deficits which impact ability to perform ADLs (bathing, care of mouth/teeth, toileting, grooming, dressing, etc.)  - Assess/evaluate cause of self-care deficits   - Assess range of motion  - Assess patient's mobility; develop plan if impaired  - Assess patient's need for assistive devices and provide as appropriate  - Encourage maximum independence but intervene and supervise when necessary  - Involve family in performance of ADLs  - Assess for home care needs following discharge   - Consider OT consult to assist with ADL evaluation and planning for discharge  - Provide patient education as appropriate  Outcome: Progressing  Goal: Maintains/Returns to pre admission functional level  Description: INTERVENTIONS:  - Perform BMAT or MOVE assessment daily.   - Set and communicate daily mobility goal to care team and patient/family/caregiver.    - Collaborate with rehabilitation services on mobility goals if consulted  - Out of bed for meals 3 times a day  - Out of bed for toileting  - Record patient progress and toleration of activity level   Outcome: Progressing     Problem: DISCHARGE PLANNING  Goal: Discharge to home or other facility with appropriate resources  Description: INTERVENTIONS:  - Identify barriers to discharge w/patient and caregiver  - Arrange for needed discharge resources and transportation as appropriate  - Identify discharge learning needs (meds, wound care, etc.)  - Arrange for interpretive services to assist at discharge as needed  - Refer to Case Management Department for coordinating discharge planning if the patient needs post-hospital services based on physician/advanced practitioner order or complex needs related to functional status, cognitive ability, or social support system  Outcome: Progressing     Problem: Knowledge Deficit  Goal: Patient/family/caregiver demonstrates understanding of disease process, treatment plan, medications, and discharge instructions  Description: Complete learning assessment and assess knowledge base. Interventions:  - Provide teaching at level of understanding  - Provide teaching via preferred learning methods  Outcome: Progressing     Problem: METABOLIC, FLUID AND ELECTROLYTES - ADULT  Goal: Electrolytes maintained within normal limits  Description: INTERVENTIONS:  - Monitor labs and assess patient for signs and symptoms of electrolyte imbalances  - Administer electrolyte replacement as ordered  - Monitor response to electrolyte replacements, including repeat lab results as appropriate  - Instruct patient on fluid and nutrition as appropriate  Outcome: Progressing  Goal: Fluid balance maintained  Description: INTERVENTIONS:  - Monitor labs   - Monitor I/O and WT  - Instruct patient on fluid and nutrition as appropriate  - Assess for signs & symptoms of volume excess or deficit  Outcome: Progressing     Problem: Nutrition/Hydration-ADULT  Goal: Nutrient/Hydration intake appropriate for improving, restoring or maintaining nutritional needs  Description: Monitor and assess patient's nutrition/hydration status for malnutrition. Collaborate with interdisciplinary team and initiate plan and interventions as ordered. Monitor patient's weight and dietary intake as ordered or per policy. Utilize nutrition screening tool and intervene as necessary. Determine patient's food preferences and provide high-protein, high-caloric foods as appropriate.      INTERVENTIONS:  - Monitor oral intake, urinary output, labs, and treatment plans  - Assess nutrition and hydration status and recommend course of action  - Evaluate amount of meals eaten  - Assist patient with eating if necessary   - Allow adequate time for meals  - Recommend/ encourage appropriate diets, oral nutritional supplements, and vitamin/mineral supplements  - Order, calculate, and assess calorie counts as needed  - Recommend, monitor, and adjust tube feedings and TPN/PPN based on assessed needs  - Assess need for intravenous fluids  - Provide specific nutrition/hydration education as appropriate  - Include patient/family/caregiver in decisions related to nutrition  Outcome: Progressing     Problem: SAFETY,RESTRAINT: NV/NON-SELF DESTRUCTIVE BEHAVIOR  Goal: Remains free of harm/injury (restraint for non violent/non self-detsructive behavior)  Description: INTERVENTIONS:  - Instruct patient/family regarding restraint use   - Assess and monitor physiologic and psychological status   - Provide interventions and comfort measures to meet assessed patient needs   - Identify and implement measures to help patient regain control  - Assess readiness for release of restraint   Outcome: Progressing  Goal: Returns to optimal restraint-free functioning  Description: INTERVENTIONS:  - Assess the patient's behavior and symptoms that indicate continued need for restraint  - Identify and implement measures to help patient regain control  - Assess readiness for release of restraint   Outcome: Progressing     Problem: Prexisting or High Potential for Compromised Skin Integrity  Goal: Skin integrity is maintained or improved  Description: INTERVENTIONS:  - Identify patients at risk for skin breakdown  - Assess and monitor skin integrity  - Assess and monitor nutrition and hydration status  - Monitor labs   - Assess for incontinence   - Turn and reposition patient  - Assist with mobility/ambulation  - Relieve pressure over bony prominences  - Avoid friction and shearing  - Provide appropriate hygiene as needed including keeping skin clean and dry  - Evaluate need for skin moisturizer/barrier cream  - Collaborate with interdisciplinary team   - Patient/family teaching  - Consider wound care consult   Outcome: Progressing

## 2023-08-21 NOTE — ASSESSMENT & PLAN NOTE
Lab Results   Component Value Date    EGFR 6 08/21/2023    EGFR 10 08/20/2023    EGFR 6 08/19/2023    CREATININE 7.50 (H) 08/21/2023    CREATININE 5.15 (H) 08/20/2023    CREATININE 7.48 (H) 08/19/2023   Patient is on Tuesday Thursday Saturday schedule at [address]  Last hemodialysis was on 8/17/2023  Patient underwent hemodialysis  on 8/19/2023. Next session is due for tomorrow.   Patient has right IJ permacath

## 2023-08-21 NOTE — QUICK NOTE
Given Ativan 2mg IV x 1 and seroquel 25mg po x 1 overnight for agitation. Placed psych consult for agitation. Detail Level: Zone Detail Level: Detailed

## 2023-08-21 NOTE — CONSULTS
[address] [facility]   Neurology Initial Consult    Alysa Elizondo is a 58 y.o. male  [address] [facility]-*          Information obtained from:   Chief Complaint   Patient presents with   • Fall     Pt arrives BLS from home, reported that he was found on the ground, co of lower back pain. Pt awake, follows commands. Not answers the time and year "I don't know". Daughter called 911. Pt doesn't know how he fall and how long he has been on the floor. Assessment/Plan:    1. Metabolic encephalopathy  2. End-stage renal disease  3. Diabetes  4. Foot wounds  5. HTN  6. TIAs/chronic right inferior putamen infarct    -Monitored on telemetry  -Fall risk  -Patient not fully participating in neurological exam, will get an MRI of the brain to rule out any underlying structural abnormalities  -Suspect patient with toxic metabolic encephalopathy likely related to sepsis, possibly related to his foot ulcerations. -Medical team working up metabolic etiology    Patient is a 50-year-old male who suffered a fall at home, laid on the floor for approximately 2 days before he was found by his daughter. Patient presented to the ER with blood pressure of 180/77, ultimately which dropped to 86/42 with a heart rate in the 50s. Patient was unable to get his dialysis on day of admission, his creatinine was 7.40 with a BUN of 30. Patient also had a sodium level of 134 and a magnesium of 1.8. Patient's BNP was 1861, his CPK was 1057. Patient had a WBC count of 14.01, received antibiotics over course of 2 days his WBC count has decreased. CTA of the head and neck showing a mild ICA stenosis bilaterally. Mild intracranial vertebral artery stenosis left greater than right and a C7 compression fracture. Patient does have chronic C7 and T10 compression fractures, he complains of back pain frequently. With patient at bedside today, he is slightly lethargic and groggy, barely able to open his eyes for testing.   He was able to follow some commands before dozing back to sleep. He required frequent commands and attention to continue with his neurological exam,   unable to fully examine patient from a neurological perspective. Patient with end-stage renal disease, nephrology on board for further recommendations regarding dialysis. Medical team on board for further evaluation and treatment of sepsis for possible etiology of metabolic encephalopathy. Suspect patient's symptoms are primarily related to TME, cannot completely rule out the possibility of underlying ischemic event for which an MRI of the brain will be completed. MRI of the brain ordered, patient may require sedation to be still during testing. Recommendations for outpatient neurological follow up have yet to be determined. HPI:  Darryle Salon. Andrey Velazquez is a 26-year-old male with PMH of CKD/ESRD on dialysis, DM, HTN, TIAs, compression fractures of C7 and T10 as well as a chronic right lacunar inferior putamen infarct who was brought to the ER after being found down at home. Patient's family member last seen patient on Thursday, 8/17/2023.  2 days later that she was unable to reach him via phone, went to check on him and found him on the floor. Patient unable to tell how long he had been on the floor however suspects it was approximately 2 days. Patient was brought to the ER for further evaluation and treatment. Had initial blood pressure of 180/77 then overnight had low blood pressure of 86/42 with a heart rate in the 50s. He had altered mental status with reports of back pain and noted foot wound which patient indicated was doing his own foot wound treatment for. Patient unable to remember how he fell, he suspects he was there for 2 days. He was able to go to his dialysis on Thursday however was unable to go to dialysis on Saturday. Patient presented to the ER with a creatinine of 7.45, BUN was 38. Patient sodium level was 134 with a magnesium of 1.8. Patient's BNP was 1861 and he had a CPK of 1057. Patient was sent for CTA of the head and neck which showed mild ICA stenosis bilaterally as well as mild intracranial vertebral artery stenosis left greater than right. Also noted a C7 compression fracture along with a history of T10 compression fracture noted in notations. Patient had a WBC count of 14.01. According to patient's family patient's confusion is not at his baseline. Patient was recommended for additional CT scans however refused. Patient had some complaints of back pain however denied any headache, dizziness, chest pain, shortness of breath or abdominal pain per medicine notations. Patient was admitted for further evaluation of altered mental status, he did receive antibiotics for short course, his WBC counts have decreased to near normal ranges. Nephrology was consulted, dialysis was ordered. Patient's creatinine trended down slightly, pick back up to 7.5 today. Patient's BUN is also up to 49. Patient continued to be encephalopathic, he is agitated, pulling at lines and IV sites. He has been given multiple benzodiazepines to help calm him. Psych consultation noted from this AM.  Neurology consulted for possible underlying neurological etiology. Patient denied having any headache or dizziness today. He is unable to fully converse with regards to his current symptoms however does not appear to be in distress with regards to pain at this time. Patient is moving all of his extremities spontaneously however doing very little to command. His eyes move from side to side, up and down however he does not completely follow instructions for testing. He is lethargic this a.m., he is oriented to self and he knows he is in the hospital however cannot give the date month or time. Question the etiology of patient's altered mental status. He had leukocytosis with elevation greater than 14.   He had missed dialysis and had been on the floor for 2 days without eating or drinking. He was in rhabdo slightly elevated CPK levels for which IV fluids was provided per the PCP. Question the etiology of patient's symptoms related to metabolic encephalopathy secondary to possible fever/sepsis related to his foot wounds versus other etiology. Met with patient at bedside, he was not participatory in his neurological exam today. He was able to answer some questions and follow some commands however would not complete all of them and needed frequent refocusing. We will get an MRI of the brain to further evaluate for any structural and/or ischemic abnormalities.        Past Medical History:   Diagnosis Date   • CKD    • Diabetes mellitus ([address])    • Hypertension    • Left toe amputee ([address])    • TIA (transient ischemic attack)        Past Surgical History:   Procedure Laterality Date   • IR TUNNELED DIALYSIS CATHETER PLACEMENT  1/27/2022       No Known Allergies      Current Facility-Administered Medications:   •  acetaminophen (TYLENOL) tablet 650 mg, 650 mg, Oral, Q6H PRN, Joel Dalton MD, 650 mg at 08/20/23 1141  •  acetaminophen (TYLENOL) tablet 975 mg, 975 mg, Oral, Q8H, Joel Dalton MD, 975 mg at 08/21/23 1544  •  atorvastatin (LIPITOR) tablet 80 mg, 80 mg, Oral, Daily, Marisela Dalton MD, 80 mg at 08/21/23 0950  •  carvedilol (COREG) tablet 12.5 mg, 12.5 mg, Oral, BID, Joel Dalton MD, 12.5 mg at 08/21/23 1729  •  docusate sodium (COLACE) capsule 100 mg, 100 mg, Oral, BID, Joel Dalton MD, 100 mg at 08/21/23 1729  •  famotidine (PEPCID) tablet 10 mg, 10 mg, Oral, Daily, Joel Dalton MD, 10 mg at 08/21/23 0950  •  heparin (porcine) subcutaneous injection 5,000 Units, 5,000 Units, Subcutaneous, Q8H [address], Joel Dalton MD, 5,000 Units at 08/21/23 1544  •  HYDROmorphone (DILAUDID) injection 1 mg, 1 mg, Intravenous, Q4H PRN, Marisela Dalton MD, 1 mg at 08/19/23 2054  •  insulin lispro (HumaLOG) 100 units/mL subcutaneous injection 1-5 Units, 1-5 Units, Subcutaneous, HS, Melva Rob MD, 1 Units at 08/20/23 2243  •  insulin lispro (HumaLOG) 100 units/mL subcutaneous injection 2-12 Units, 2-12 Units, Subcutaneous, TID AC, 2 Units at 08/21/23 1726 **AND** Fingerstick Glucose (POCT), , , TID AC, González Dalton MD  •  lidocaine (LIDODERM) 5 % patch 1 patch, 1 patch, Topical, Daily, Melva Rob MD, 1 patch at 08/21/23 0949  •  LORazepam (ATIVAN) injection 0.5 mg, 0.5 mg, Intravenous, Q6H PRN, Melva Rob MD, 0.5 mg at 08/21/23 0798  •  magnesium Oxide (MAG-OX) tablet 400 mg, 400 mg, Oral, BID, Melva Rob MD, 400 mg at 08/21/23 1729  •  methocarbamol (ROBAXIN) tablet 500 mg, 500 mg, Oral, Q12H PRN, Melva Rob MD, 500 mg at 08/19/23 1423  •  NIFEdipine (PROCARDIA XL) 24 hr tablet 30 mg, 30 mg, Oral, BID, Melva Rob MD, 30 mg at 08/21/23 1729  •  ondansetron (ZOFRAN) injection 4 mg, 4 mg, Intravenous, Q6H PRN, Melva Rob MD  •  oxyCODONE (ROXICODONE) IR tablet 5 mg, 5 mg, Oral, Q8H PRN, Melva Rob MD, 5 mg at 08/19/23 1423  •  sevelamer (RENAGEL) tablet 800 mg, 800 mg, Oral, TID With Meals, Melva Rob MD, 800 mg at 08/21/23 1710    Social History     Socioeconomic History   • Marital status:      Spouse name: Not on file   • Number of children: Not on file   • Years of education: Not on file   • Highest education level: Not on file   Occupational History   • Not on file   Tobacco Use   • Smoking status: Never     Passive exposure: Never   • Smokeless tobacco: Never   Vaping Use   • Vaping Use: Never used   Substance and Sexual Activity   • Alcohol use: Not Currently     Alcohol/week: 0.0 standard drinks of alcohol     Comment: 0   • Drug use: Never   • Sexual activity: Not on file   Other Topics Concern   • Not on file   Social History Narrative   • Not on file     Social Determinants of Health     Financial Resource Strain: Not on file   Food Insecurity: No Food Insecurity (7/27/2022)    Hunger Vital Sign    • Worried About Running Out of Food in the Last Year: Never true    • Ran Out of Food in the Last Year: Never true   Transportation Needs: No Transportation Needs (7/27/2022)    PRAPARE - Transportation    • Lack of Transportation (Medical): No    • Lack of Transportation (Non-Medical): No   Physical Activity: Not on file   Stress: Not on file   Social Connections: Not on file   Intimate Partner Violence: Not on file   Housing Stability: Low Risk  (7/27/2022)    Housing Stability Vital Sign    • Unable to Pay for Housing in the Last Year: No    • Number of Places Lived in the Last Year: 2    • Unstable Housing in the Last Year: No       History reviewed. No pertinent family history. Review of systems:  Patient unable/unwilling to participate in ROS. States that he is tired    Physical examination:  /60 (BP Location: Left arm)   Pulse 72   Temp 98.4 °F (36.9 °C) (Oral)   Resp 20   Ht 6' 1" (1.854 m)   Wt 115 kg (253 lb 8.5 oz)   SpO2 92%   BMI 33.45 kg/m²     GENERAL APPEARANCE:  The patient is alert, oriented. HEENT:  Head is normocephalic. Pupils are equal and reactive. NECK:  Supple without lymphadenopathy. HEART:  Regular rate and rhythm. LUNGS:  clear to auscultation. No crackles or wheezes are heard. ABDOMEN:  Soft, nontender, nondistended with good bowel sounds heard. EXTREMITIES:  Without cyanosis, clubbing or edema. Mental status: The patient is groggy/lethargic, inattentive to his exam.  He is oriented to himself and place, unable to give time. Speech is limited, yes no answers only. Unable to provide feedback for repetition comprehension or naming. Cranial nerves:  CN II: Visual fields are unable to be assessed. Fundoscopic exam is unable to be assessed  Pupils are 2 mm and briskly reactive to light. CN III, IV, VI: At primary gaze, there is no eye deviation.    CN V: Facial sensation is intact  in all 3 divisions bilaterally. Corneal responses are intact. CN VII: Face is symmetric with normal eye closure and smile. CN VIII: Hearing is normal to rubbing fingers  CN IX, X: Phonation is soft. CN XI: Shoulder shrug are intact  CN XII: Tongue is midline and dry  Motor: There is no pronator drift of out-stretched arms. Muscle bulk and tone are normal.   Patient moving his body spontaneously at times. Unable to do motor exam effectively due to patient's unwillingness to participate. Reflexes    RJ BJ TJ KJ AJ Plantars Love's   Right 2+ 2+  2+ 2+ Downgoing Not present   Left 2+ 2+  2+ 2+ Downgoing Not present      Sensory:  Light touch, Temperature, position sense, and vibration sense are intact in fingers and toes. Patient reports lesser sensation to temperature and vibration to the R UE compared to the L UE coordination:  Unable to assess due to patient's willingness to participate  Romberg deferred for safety  Gait/Stance:  Deferred for safety    Lab Results   Component Value Date    WBC 7.42 08/21/2023    HGB 11.8 (L) 08/21/2023    HCT 37.0 08/21/2023    MCV 93 08/21/2023     08/21/2023     Lab Results   Component Value Date    HGBA1C 6.6 (H) 07/07/2022     Lab Results   Component Value Date    ALT 14 08/19/2023    AST 35 08/19/2023    ALKPHOS 69 08/19/2023     Lab Results   Component Value Date    CALCIUM 8.8 08/21/2023    K 4.1 08/21/2023    CO2 28 08/21/2023    CL 94 (L) 08/21/2023    BUN 49 (H) 08/21/2023    CREATININE 7.50 (H) 08/21/2023         Review of reports and notes reveal:  Independent Interpretation of images or specimens:  CTA head and neck w wo contrast    Result Date: 8/20/2023  No acute intracranial abnormality. Tiny hypodensity right posterior inferior putamen, likely chronic lacunar infarct or dilated perivascular space. Negative CTA head and neck for large vessel occlusion, dissection, aneurysm, or high-grade stenosis. Possible tracheobronchomalacia.  Additional chronic/incidental findings as detailed above. The study was marked in [facility] for immediate notification. Workstation performed: [id number]           Thank you for this consult. Total time of encounter: 70 Minutes  More than 50% of time was spent in counseling and coordination of care of patient. Discussed with patient at bedside, patient is lethargic and having minimal conversation.   Discussed with medical team and attending neurology MD.

## 2023-08-21 NOTE — ASSESSMENT & PLAN NOTE
White count elevated 14,000 but patient is afebrile. White count has normalized. Chart review showed chronic elevation of white count  Likely stress response  Patient noted to have right plantar wound which is probing to the bone. Patient was seen by wound care. Podiatry was consulted. We will check blood cultures x2, UA was also ordered yesterday which is pending at the current time.   Right foot x-ray was also ordered to rule out osteomyelitis

## 2023-08-21 NOTE — DISCHARGE INSTR - OTHER ORDERS
Plan:   Skin care plans:    1-Follow Podiatry orders for bilateral plantar foot wounds - cleanse wounds with NSS & pat dry. Apply Maxorb Ag+ silver alginate (in silver package) to wounds cut to size. Cover with ABD pads, kerlix & tape. Change daily & as needed for soilage/dislodgement. 2-Hydraguard barrier cream or equivalent to bilateral sacrum, buttock and heels 2x/day and as needed. 3-Float heels on 2 pillows to offload pressure so heels are not in contact with mattress or pillows. 4-Use pressure redistribution cushion in chair when out of bed. 5-Moisturize skin daily with skin nourishing cream.  6-Turn/reposition every 2 hrs for pressure re-distribution on skin. 7-Follow up with Podiatry as directed. Discharge Instructions - Podiatry    Weight Bearing Status: Nonweightbearing to right lower extremity                       Follow-up appointment instructions: Please make an appointment within one week of discharge with Dr. Timothy Dutta at [address] [place] [address] wound care center. Contact sooner if any increase in pain, or signs of infection occur    Nursing Wound Care Instructions: Every Tuesday, Thursday, Saturday please remove wound VAC. Lightly flush right plantar foot ulceration with normal sterile saline and dry thoroughly. Cover incision site with Maxorb AG. Cover ulceration with black foam sponge, protect surrounding skin edges for protection against maceration. Apply wound VAC dressing and start wound VAC to 125 mmHg low continuous.

## 2023-08-21 NOTE — PLAN OF CARE
Problem: Potential for Falls  Goal: Patient will remain free of falls  Description: INTERVENTIONS:  - Educate patient/family on patient safety including physical limitations  - Instruct patient to call for assistance with activity   - Consult OT/PT to assist with strengthening/mobility   - Keep Call bell within reach  - Keep bed low and locked with side rails adjusted as appropriate  - Keep care items and personal belongings within reach  - Initiate and maintain comfort rounds  - Make Fall Risk Sign visible to staff  - Offer Toileting every 2 Hours, in advance of need  - Initiate/Maintain bed alarm  - Obtain necessary fall risk management equipment: yes    - Apply yellow socks and bracelet for high fall risk patients  - Consider moving patient to room near nurses station  Outcome: Progressing     Problem: MOBILITY - ADULT  Goal: Maintain or return to baseline ADL function  Description: INTERVENTIONS:  -  Assess patient's ability to carry out ADLs; assess patient's baseline for ADL function and identify physical deficits which impact ability to perform ADLs (bathing, care of mouth/teeth, toileting, grooming, dressing, etc.)  - Assess/evaluate cause of self-care deficits   - Assess range of motion  - Assess patient's mobility; develop plan if impaired  - Assess patient's need for assistive devices and provide as appropriate  - Encourage maximum independence but intervene and supervise when necessary  - Involve family in performance of ADLs  - Assess for home care needs following discharge   - Consider OT consult to assist with ADL evaluation and planning for discharge  - Provide patient education as appropriate  Outcome: Progressing  Goal: Maintains/Returns to pre admission functional level  Description: INTERVENTIONS:  - Perform BMAT or MOVE assessment daily.   - Set and communicate daily mobility goal to care team and patient/family/caregiver.    - Collaborate with rehabilitation services on mobility goals if consulted  - Perform Range of Motion 3 times a day. - Reposition patient every 2 hours.   - Dangle patient 3 times a day  - Stand patient 3 times a day  - Ambulate patient 3 times a day  - Out of bed to chair 3 times a day   - Out of bed for meals 3 times a day  - Out of bed for toileting  - Record patient progress and toleration of activity level   Outcome: Progressing     Problem: PAIN - ADULT  Goal: Verbalizes/displays adequate comfort level or baseline comfort level  Description: Interventions:  - Encourage patient to monitor pain and request assistance  - Assess pain using appropriate pain scale  - Administer analgesics based on type and severity of pain and evaluate response  - Implement non-pharmacological measures as appropriate and evaluate response  - Consider cultural and social influences on pain and pain management  - Notify physician/advanced practitioner if interventions unsuccessful or patient reports new pain  Outcome: Progressing     Problem: INFECTION - ADULT  Goal: Absence or prevention of progression during hospitalization  Description: INTERVENTIONS:  - Assess and monitor for signs and symptoms of infection  - Monitor lab/diagnostic results  - Monitor all insertion sites, i.e. indwelling lines, tubes, and drains  - Monitor endotracheal if appropriate and nasal secretions for changes in amount and color  - [place] appropriate cooling/warming therapies per order  - Administer medications as ordered  - Instruct and encourage patient and family to use good hand hygiene technique  - Identify and instruct in appropriate isolation precautions for identified infection/condition  Outcome: Progressing  Goal: Absence of fever/infection during neutropenic period  Description: INTERVENTIONS:  - Monitor WBC    Outcome: Progressing     Problem: SAFETY ADULT  Goal: Patient will remain free of falls  Description: INTERVENTIONS:  - Educate patient/family on patient safety including physical limitations  - Instruct patient to call for assistance with activity   - Consult OT/PT to assist with strengthening/mobility   - Keep Call bell within reach  - Keep bed low and locked with side rails adjusted as appropriate  - Keep care items and personal belongings within reach  - Initiate and maintain comfort rounds  - Make Fall Risk Sign visible to staff  - Offer Toileting every 2 Hours, in advance of need  - Initiate/Maintain bed alarm  - Obtain necessary fall risk management equipment: yes  - Apply yellow socks and bracelet for high fall risk patients  - Consider moving patient to room near nurses station  Outcome: Progressing  Goal: Maintain or return to baseline ADL function  Description: INTERVENTIONS:  -  Assess patient's ability to carry out ADLs; assess patient's baseline for ADL function and identify physical deficits which impact ability to perform ADLs (bathing, care of mouth/teeth, toileting, grooming, dressing, etc.)  - Assess/evaluate cause of self-care deficits   - Assess range of motion  - Assess patient's mobility; develop plan if impaired  - Assess patient's need for assistive devices and provide as appropriate  - Encourage maximum independence but intervene and supervise when necessary  - Involve family in performance of ADLs  - Assess for home care needs following discharge   - Consider OT consult to assist with ADL evaluation and planning for discharge  - Provide patient education as appropriate  Outcome: Progressing  Goal: Maintains/Returns to pre admission functional level  Description: INTERVENTIONS:  - Perform BMAT or MOVE assessment daily.   - Set and communicate daily mobility goal to care team and patient/family/caregiver. - Collaborate with rehabilitation services on mobility goals if consulted  - Perform Range of Motion 3 times a day. - Reposition patient every 2 hours.   - Dangle patient 3 times a day  - Stand patient 3 times a day  - Ambulate patient 3 times a day  - Out of bed to chair 3 times a day   - Out of bed for meals 3 times a day  - Out of bed for toileting  - Record patient progress and toleration of activity level   Outcome: Progressing     Problem: DISCHARGE PLANNING  Goal: Discharge to home or other facility with appropriate resources  Description: INTERVENTIONS:  - Identify barriers to discharge w/patient and caregiver  - Arrange for needed discharge resources and transportation as appropriate  - Identify discharge learning needs (meds, wound care, etc.)  - Arrange for interpretive services to assist at discharge as needed  - Refer to Case Management Department for coordinating discharge planning if the patient needs post-hospital services based on physician/advanced practitioner order or complex needs related to functional status, cognitive ability, or social support system  Outcome: Progressing     Problem: Knowledge Deficit  Goal: Patient/family/caregiver demonstrates understanding of disease process, treatment plan, medications, and discharge instructions  Description: Complete learning assessment and assess knowledge base.   Interventions:  - Provide teaching at level of understanding  - Provide teaching via preferred learning methods  Outcome: Progressing     Problem: METABOLIC, FLUID AND ELECTROLYTES - ADULT  Goal: Electrolytes maintained within normal limits  Description: INTERVENTIONS:  - Monitor labs and assess patient for signs and symptoms of electrolyte imbalances  - Administer electrolyte replacement as ordered  - Monitor response to electrolyte replacements, including repeat lab results as appropriate  - Instruct patient on fluid and nutrition as appropriate  Outcome: Progressing  Goal: Fluid balance maintained  Description: INTERVENTIONS:  - Monitor labs   - Monitor I/O and WT  - Instruct patient on fluid and nutrition as appropriate  - Assess for signs & symptoms of volume excess or deficit  Outcome: Progressing     Problem: Nutrition/Hydration-ADULT  Goal: Nutrient/Hydration intake appropriate for improving, restoring or maintaining nutritional needs  Description: Monitor and assess patient's nutrition/hydration status for malnutrition. Collaborate with interdisciplinary team and initiate plan and interventions as ordered. Monitor patient's weight and dietary intake as ordered or per policy. Utilize nutrition screening tool and intervene as necessary. Determine patient's food preferences and provide high-protein, high-caloric foods as appropriate.      INTERVENTIONS:  - Monitor oral intake, urinary output, labs, and treatment plans  - Assess nutrition and hydration status and recommend course of action  - Evaluate amount of meals eaten  - Assist patient with eating if necessary   - Allow adequate time for meals  - Recommend/ encourage appropriate diets, oral nutritional supplements, and vitamin/mineral supplements  - Order, calculate, and assess calorie counts as needed  - Recommend, monitor, and adjust tube feedings and TPN/PPN based on assessed needs  - Assess need for intravenous fluids  - Provide specific nutrition/hydration education as appropriate  - Include patient/family/caregiver in decisions related to nutrition  Outcome: Progressing     Problem: SAFETY,RESTRAINT: NV/NON-SELF DESTRUCTIVE BEHAVIOR  Goal: Remains free of harm/injury (restraint for non violent/non self-detsructive behavior)  Description: INTERVENTIONS:  - Instruct patient/family regarding restraint use   - Assess and monitor physiologic and psychological status   - Provide interventions and comfort measures to meet assessed patient needs   - Identify and implement measures to help patient regain control  - Assess readiness for release of restraint   Outcome: Progressing  Goal: Returns to optimal restraint-free functioning  Description: INTERVENTIONS:  - Assess the patient's behavior and symptoms that indicate continued need for restraint  - Identify and implement measures to help patient regain control  - Assess readiness for release of restraint   Outcome: Progressing     Problem: Prexisting or High Potential for Compromised Skin Integrity  Goal: Skin integrity is maintained or improved  Description: INTERVENTIONS:  - Identify patients at risk for skin breakdown  - Assess and monitor skin integrity  - Assess and monitor nutrition and hydration status  - Monitor labs   - Assess for incontinence   - Turn and reposition patient  - Assist with mobility/ambulation  - Relieve pressure over bony prominences  - Avoid friction and shearing  - Provide appropriate hygiene as needed including keeping skin clean and dry  - Evaluate need for skin moisturizer/barrier cream  - Collaborate with interdisciplinary team   - Patient/family teaching  - Consider wound care consult   Outcome: Progressing

## 2023-08-21 NOTE — ASSESSMENT & PLAN NOTE
Lab Results   Component Value Date    HGBA1C 6.6 (H) 07/07/2022       Recent Labs     08/20/23  1550 08/20/23  2049 08/21/23  0724 08/21/23  1131   POCGLU 143* 168* 137 134       Blood Sugar Average: Last 72 hrs:  (P) 134.75   Patient is on linagliptin at home. Continue Humalog sliding scale with Accu-Cheks before every meal and at bedtime.   Patient has been a diabetic diet

## 2023-08-21 NOTE — ASSESSMENT & PLAN NOTE
Patient has been confused and more agitated and not following commands and refusing all treatment today. Pending urine drug screen  Neurology consult pending at the current time-based on further course might need to consider MRI of the brain 2. Patient has known history of TIAs  CT of the head and neck with no acute intracranial abnormality with tiny hypodensity in the right posterior inferior putamen likely chronic lacunar infarct dilated prevascular space. Negative CTA for large vessel occlusion dissection or aneurysm with possible tracheobronchomalacia  Discussed with patient's friend-no history of alcohol use or drug use  Ammonia and TSH level were normal  Patient continues to remain intermittently agitated, confused, not following commands  Psychiatry was consulted-recommending Haldol 2 mg p.o. every 4 hours as needed for agitation at 2 mg IM as needed for severe agitation. We will get baseline EKG for QTc  Started septic work-up also as patient noted to have deep left foot wound-patient has been afebrile and had white count initially which has since normalized.

## 2023-08-21 NOTE — WOUND OSTOMY CARE
Progress Note - Wound   Concepcion Lorenzo 58 y.o. male MRN: [id number]  Unit/Bed#: [address] [place] [address] Encounter: [id number]      Assessment: This is a 58year old male patient admitted on 8/19/23 s/p unwitnessed fall. Patient may have been down for 2 days and developed rhabdomyolysis. He has a history of ESRD on chronic hemodialysis, HTN, NIDDM and TIA. He was awake, alert & oriented to person only and was agreeable to have wound visit done - left wrist restraint in place due to confusion/combativeness. Assessment Findings:   1-Full thickness wound to right plantar foot (submet 1) with pale pink granulation tissue and yellow slough. There is circumferential undermining with deepest depth at 3:00 measuring   3 cm and probing to bone. Podiatry consult requested by Dr Amalia Carroll and wound care orders placed by Dr Genoveva Coe. Please see below for detailed assessments. 2-Full thickness wound to left plantar foot (submet 1) with dry black eschar and callus to periwound. Podiatry consult requested by Dr Amalia Carroll and wound care orders placed by Dr Genoveva Coe. 3-Sacrobuttocks and heels intact - orders in place for skin care and for prevention. Plan:   Skin care plans:  1-Follow Podiatry orders for submet 1 bilateral plantar foot wounds - cleanse wounds with NSS & pat dry. Apply Maxorb Ag+ silver alginate (in silver package) to wounds cut to size. Cover with ABD pads, kerlix & tape. Change daily & prn.  2-Hydraguard to bilateral sacrum, buttock and heels BID and PRN  3-Float heels on 2 pillows to offload pressure so heels are not in contact with mattress or pillows. 4-Ehob pressure redistribution cushion in chair when out of bed. 5-Moisturize skin daily with skin nourishing cream.  6-Turn/reposition q2h or when medically stable for pressure re-distribution on skin. 7-Wound care signing off.     Wound 08/19/23 Diabetic Ulcer Foot Right;Plantar (Active) Submet 1   Wound Image   08/21/23 1400   Wound Description Granulation tissue;Pink;Pale;Slough; Yellow;Probes to bone 08/21/23 1400   Liliana-wound Assessment Callus 08/21/23 1400   Wound Length (cm) 1.9 cm 08/21/23 1400   Wound Width (cm) 1.2 cm 08/21/23 1400   Wound Depth (cm) 0.4 cm 08/21/23 1400   Wound Surface Area (cm^2) 2.28 cm^2 08/21/23 1400   Wound Volume (cm^3) 0.912 cm^3 08/21/23 1400   Calculated Wound Volume (cm^3) 0.91 cm^3 08/21/23 1400   Undermining Circumferential - deepest depth @ 3:00-bone palpable 08/21/23 1400   Drainage Amount Moderate 08/21/23 1400   Drainage Description Serosanguineous 08/21/23 1400   Non-staged Wound Description Full thickness 08/21/23 1400   Treatments Cleansed 08/21/23 1400   Dressing Maxorb Ag+;ABD;dry dressing (pre and post debridement). 08/20/23 1400   Wound packed? No 08/21/23 1400   Dressing Changed New 08/21/23 1400   Dressing Status Clean;Dry; Intact 08/21/23 1400       Wound 08/19/23 Diabetic Ulcer Left;Plantar (Active) Submet 1   Wound Image   08/21/23 1400   Wound Description Dry;Black;Eschar 08/21/23 1400   Liliana-wound Assessment Callus 08/21/23 1400   Wound Length (cm) 1 cm 08/21/23 1400   Wound Width (cm) 1 cm 08/21/23 1400   Wound Surface Area (cm^2) 1 cm^2 08/21/23 1400   Drainage Amount None 08/21/23 1400   Non-staged Wound Description Full thickness 08/21/23 1400   Treatments Cleansed 08/21/23 1400   Dressing Betadine swab;ABD;Dry dressing (pre-debridement) 08/21/23 1400   Wound packed? No 08/21/23 1400   Dressing Changed New 08/21/23 1400   Dressing Status Clean;Dry; Intact 08/21/23 1400     Discussed assessment findings, and plan of care/recommendations with Dr Luiz Self and Israel Whitaker RN. Podiatry consult requested by Dr Luiz Self. Wound care signing off at this time, please call or tiger text with questions and concerns. Recommendations written as orders.   Brandon Valdovinos MSN, RN, [facility]

## 2023-08-21 NOTE — ASSESSMENT & PLAN NOTE
Patient sustained a fall about 2 days ago  Etiology not clear-EKG was unremarkable. Blood sugar was 157. Questionable hypoglycemia versus accidental fall. Patient not exhibiting any signs of stroke. Telemetry showed no evidence of arrhythmia  Patient does not remember how he fell. Patient was found on the floor and could not get up. We will get PT/OT evaluation and treatment  CAT scan of the brain, CT chest abdomen pelvis, cervical spine and lumbar reconstructions study was ordered in the ED but patient refused the studies  Patient got CT of the head and neck after receiving Ativan which was unremarkable.

## 2023-08-21 NOTE — ASSESSMENT & PLAN NOTE
Sodium 134 with magnesium level of 1.8  Magnesium oxide 400 mg p.o. twice daily ordered  Follow BMP and magnesium level in a.m.

## 2023-08-21 NOTE — CONSULTS
Consultation - [address] [facility] 58 y.o. male MRN: [id number]  Unit/Bed#: [address] [place] [address] Encounter: [id number]      Chief Complaint: "I don't know"    History of Present Illness   Physician Requesting Consult: Farhat Bull MD  Reason for Consult / Principal Problem: Agitation Dx 1. Altered mental status    Georgia Harman is a 58 y.o. male with past medical history of end-stage renal disease, hypertension, type 2 diabetes who presented to the emergency department after being found down. Per ED documentation patient reported that he had ended up on the ground of his house and was unsure how he got there and patient stated he believes he was on the ground for approximately 2 days. Per documentation patient has end-stage renal disease with Tuesday, Thursday, Saturday dialysis and patient missed dialysis on Saturday morning. Patient was subsequently admitted and has underwent medical work-up. Psychiatry has been consulted for agitation. Per chart, patient has received prn medication for agitation including lorazepam 0.5 mg, Haldol 2 mg IM, and Ativan 2 mg IV on 2 different occasions. Per nurse, patient has been exhibiting agitative behavior of removing his telemetry, trying to get out of bed, and interfering with his care. Patient is a poor historian. He was laying in bed. His mouth stayed open and his eyes were nearly closed. He exhibits some agitation displayed by moving his upper extremities bilaterally in soft restraints and periodically and repeatedly thrusting his hips forward tremulously. He stated his name and that he knew he is in the hospital however did not know which one. He stated he did not know the date or the current president. He denies having pain. He denies having suicidal or homicidal ideation, plan, or intent. Denies drinking alcohol daily. Denies taking psychiatric medication.   Patient follows commands to squeeze fingers bilaterally and extend wrists bilaterally. He declined twice to have me call family for more information. Psychiatric Review Of Systems:  Unable to assess due to patient factors    Historical Information   Past Psychiatric History:   Able to assess due to patient factors. Patient denies taking psychiatric medication    Substance Abuse History:   Denies daily alcohol use     I have assessed this patient for substance use within the past 12 months     History of IP/OP rehabilitation program: Unable to assess due to patient factors  Smoking history: Unable to assess due to patient factors    Family Psychiatric History:   Unable to assess due to patient factors    Social History  Education: Unable to assess due to patient factors  Learning Disabilities: Unable to assess due to patient factors  Marital history:   Living arrangement, social support: Unable to assess due to patient factors  Occupational History: Unable to assess due to patient factors  Functioning Relationships: Unable to assess due to patient factors.   Other Pertinent History: Unable to assess due to patient factors    Traumatic History:   Abuse: Unable to assess due to patient factors  Other Traumatic Events: Unable to assess due to patient factors    Past Medical History:   Diagnosis Date   • CKD    • Diabetes mellitus ([address])    • Hypertension    • Left toe amputee ([address])    • TIA (transient ischemic attack)        Medical Review Of Systems:  Review of Systems - Unable to assess due to patient factors    Meds/Allergies   all current active meds have been reviewed  No Known Allergies    Objective   Vital signs in last 24 hours:  Temp:  [97.3 °F (36.3 °C)-99.9 °F (37.7 °C)] 98.5 °F (36.9 °C)  HR:  [] 79  Resp:  [18] 18  BP: (105-157)/(54-61) 157/61      Intake/Output Summary (Last 24 hours) at 8/21/2023 0932  Last data filed at 8/20/2023 2100  Gross per 24 hour   Intake 240 ml   Output --   Net 240 ml       Mental Status Evaluation:  Appearance:  appears stated age, laying in bed and With eyes nearly closed and mouth open, and bilateral wrists in soft restraints   Behavior:  poor eye contact and Some agitation   Speech:  soft and scant   Mood:   Does not answer   Affect:  Constricted   Language: Unable to assess due to patient factors   Thought Process:  Poverty of thought   Associations: Unable to assess due to patient factors   Thought Content:  Unable to assess due to patient factors   Perceptual Disturbances:  Unable to assess due to patient factors   Risk Potential:  Denies suicidal or homicidal ideation, plan, or intent   Sensorium:  person and [address] to hospital location and date   Memory:  Unable to assess due to patient factors   Cognition:  Unable to assess due to patient factors   Consciousness:  awake and sedated    Attention: attention span appeared shorter than expected for age   Intellect: Unable to assess due to patient factors   Fund of Knowledge: Unable to assess due to patient factors   Insight:  poor   Judgment: poor   Muscle Strength and Tone:  Diminished muscle strength with  and wrist extension bilaterally   Gait/Station: not assessed, patient in bed   Motor Activity: Patient was moving his upper extremities b/l in soft restraints and periodically and repeatedly thrusting his hips forward tremulously. Lab Results:    I have personally reviewed all pertinent laboratory/tests results.   Labs in last 72 hours:   Recent Labs     08/19/23  1053 08/20/23  0531 08/21/23  0546   WBC 14.01*   < > 7.42   RBC 3.85*   < > 4.00   HGB 11.4*   < > 11.8*   HCT 35.5*   < > 37.0      < > 195   RDW 15.9*   < > 16.1*   NEUTROABS 11.21*   < > 6.11   SODIUM 134*   < > 134*   K 4.1   < > 4.1   CL 94*   < > 94*   CO2 32   < > 28   BUN 38*   < > 49*   CREATININE 7.48*   < > 7.50*   GLUC 136   < > 134   CALCIUM 10.2   < > 8.8   AST 35  --   --    ALT 14  --   --    ALKPHOS 69  --   --    TP 7.8  --   --    ALB 3.7  --   --    TBILI 0.68  --   -- AMMONIA  --   --  18   [id number]  --   --  2.121    < > = values in this interval not displayed. Ammonia:   Lab Results   Component Value Date    AMMONIA 18 08/21/2023     Drug Screen: No results found for: "AMPMETHUR", "BARBTUR", "BDZUR", "THCUR", "COCAINEUR", "Clide [place]", "OPIATEUR", "NETPT", "ECSTASYUR"  EKG   Lab Results   Component Value Date    VENTRATE 85 01/21/2022    ATRIALRATE 85 01/21/2022    PRINT 186 01/21/2022    QRSDINT 78 01/21/2022    QTINT 378 01/21/2022    QTCINT 449 01/21/2022    PAXIS 6 01/21/2022    QRSAXIS 57 01/21/2022    TWAVEAXIS 12 01/21/2022       Code Status: )Level 1 - Full Code    Assessment/Plan     Assessment:  Dagmar Rojas is a 58 y.o. male with past medical history of end-stage renal disease, hypertension, type 2 diabetes who presented to the emergency department after being found down. Per ED documentation patient reported that he had ended up on the ground of his house and was unsure how he got there and patient stated he believes he was on the ground for approximately 2 days. Per documentation patient has end-stage renal disease with Tuesday, Thursday, Saturday dialysis and patient missed dialysis on Saturday morning. Patient was subsequently admitted and has underwent medical work-up. Psychiatry has been consulted for agitation. Per chart, patient has received prn medication for agitation including lorazepam 0.5 mg, Haldol 2 mg IM, and Ativan 2 mg IV on 2 different occasions. Per nurse, patient has been exhibiting agitative behavior of removing his telemetry, trying to get out of bed, and interfering with his care. Patient is a poor historian. He was laying in bed. His mouth stayed open and his eyes were nearly closed. He exhibits some agitation displayed by moving his upper extremities bilaterally in soft restraints and periodically and repeatedly thrusting his hips forward tremulously.   He stated his name and that he knew he is in the hospital however did not know which one. He stated he did not know the date or the current president. He denies having pain. He denies having suicidal or homicidal ideation, plan, or intent. Denies drinking alcohol daily. Denies taking psychiatric medication. Patient follows commands to squeeze fingers bilaterally and extend wrists bilaterally. He declined twice to have me call family for more information. Discussed with primary team who stated they had spoken to patient's family and that this is not patient's baseline. Family reportedly stated that patient does not use drugs or alcohol and patient denies drinking alcohol therefore have a lower suspicion for alcohol withdrawal etiology for patient's current condition. Patient is currently undergoing medical work-up for further evaluation of patient's mental status.         Diagnosis:  Delirium      Plan:   · Continue medical management   · Would avoid use of benzodiazepines if possible as there is a low suspicion for alcohol/benzodiazapine withdrawal and they are associated with worsening of confusion and sedation and patient appears sedated currently - also caution repeated parenteral doses of lorazepam in patients with hemodialysis as propylene glycol can accumulate  · Recommend Haldol 2 mg po every 4 hours prn for agitation or Haldol 2 mg IM every 4 hours prn for severe agitation if patient refuses oral route  · Limit use to one antipsychotic as use of multiple different antipsychotics can increase risk for adverse effects such as NMS  · Recommend EKG for QTc baseline  · Discussed with the primary team  · I will follow up    Risks, benefits and possible side effects of Medications:   Unable to have discussion due to patient factors      Raudel Mcgregor,

## 2023-08-21 NOTE — PROGRESS NOTES
[id number] [employer]  Progress Note  Name: Afshin Rouse  MRN: [id number]  Unit/Bed#: [address] [facility] [address] I Date of Admission: 8/19/2023   Date of Service: 8/21/2023 I Hospital Day: 2    Assessment/Plan   * Status post fall  Assessment & Plan  Patient sustained a fall about 2 days ago  Etiology not clear-EKG was unremarkable. Blood sugar was 157. Questionable hypoglycemia versus accidental fall. Patient not exhibiting any signs of stroke. Telemetry showed no evidence of arrhythmia  Patient does not remember how he fell. Patient was found on the floor and could not get up. We will get PT/OT evaluation and treatment  CAT scan of the brain, CT chest abdomen pelvis, cervical spine and lumbar reconstructions study was ordered in the ED but patient refused the studies  Patient got CT of the head and neck after receiving Ativan which was unremarkable. Altered mental status  Assessment & Plan  Patient has been confused and more agitated and not following commands and refusing all treatment today. Pending urine drug screen  Neurology consult pending at the current time-based on further course might need to consider MRI of the brain 2. Patient has known history of TIAs  CT of the head and neck with no acute intracranial abnormality with tiny hypodensity in the right posterior inferior putamen likely chronic lacunar infarct dilated prevascular space. Negative CTA for large vessel occlusion dissection or aneurysm with possible tracheobronchomalacia  Discussed with patient's friend-no history of alcohol use or drug use  Ammonia and TSH level were normal  Patient continues to remain intermittently agitated, confused, not following commands  Psychiatry was consulted-recommending Haldol 2 mg p.o. every 4 hours as needed for agitation at 2 mg IM as needed for severe agitation.   We will get baseline EKG for QTc  Started septic work-up also as patient noted to have deep left foot wound-patient has been afebrile and had white count initially which has since normalized. Friend reported similar episode of confusion with agitation and previous hospitalization about 6 months ago during which time he had an infection    Low back pain  Assessment & Plan  Patient has history of compression fracture of the T10  Currently complaining of severe lower back pain but refusing CAT scan of the lumbar spine or any imaging  Pain control with Lidoderm, Tylenol, oxycodone and Dilaudid for breakthrough pain  PT/OT evaluation and treatment    Open wound of right foot  Assessment & Plan  Patient noted to have a wound on the plantar aspect of base of first toe  Wound care evaluated the patient and wound is probing to the bone  Rule out osteomyelitis  Podiatry was consulted  We will check the right foot x-ray  Hold off on antibiotics at the current time    ESRD (end stage renal disease) [facility])  Assessment & Plan  Lab Results   Component Value Date    EGFR 6 08/21/2023    EGFR 10 08/20/2023    EGFR 6 08/19/2023    CREATININE 7.50 (H) 08/21/2023    CREATININE 5.15 (H) 08/20/2023    CREATININE 7.48 (H) 08/19/2023   Patient is on Tuesday Thursday Saturday schedule at [address] hemodialysis was on 8/17/2023  Patient underwent hemodialysis  on 8/19/2023. Next session is due for tomorrow. Patient has right IJ permacath    Electrolyte abnormality  Assessment & Plan  Sodium 134 with magnesium level of 1.8  Magnesium oxide 400 mg p.o. twice daily ordered  Follow BMP and magnesium level in a.m. Leukocytosis  Assessment & Plan  White count elevated 14,000 but patient is afebrile. White count has normalized. Chart review showed chronic elevation of white count  Likely stress response  Patient noted to have right plantar wound which is probing to the bone. Patient was seen by wound care. Podiatry was consulted. We will check blood cultures x2, UA was also ordered yesterday which is pending at the current time.   Right foot x-ray was also ordered to rule out osteomyelitis    Traumatic rhabdomyolysis [facility])  Assessment & Plan  After sustaining a fall  CK level mildly elevated at 1200 -564  CK level has improved after dialysis. Hypertension  Assessment & Plan  Continue Procardia 30 mg p.o. twice daily, Coreg 12.5 mg p.o. twice daily    Type 2 diabetes mellitus, without long-term current use of insulin [facility])  Assessment & Plan  Lab Results   Component Value Date    HGBA1C 6.6 (H) 07/07/2022       Recent Labs     08/20/23  1550 08/20/23  2049 08/21/23  0724 08/21/23  1131   POCGLU 143* 168* 137 134       Blood Sugar Average: Last 72 hrs:  (P) 134.75   Patient is on linagliptin at home. Continue Humalog sliding scale with Accu-Cheks before every meal and at bedtime. Patient has been a diabetic diet               VTE Pharmacologic Prophylaxis: VTE Score: 2 Moderate Risk (Score 3-4) - Pharmacological DVT Prophylaxis Ordered: heparin. Patient Centered Rounds: I performed bedside rounds with nursing staff today. Discussions with Specialists or Other Care Team Provider: Neurology, podiatry    Education and Discussions with Family / Patient: Updated  (friend) via phone. Total Time Spent on Date of Encounter in care of patient: 45 minutes This time was spent on one or more of the following: performing physical exam; counseling and coordination of care; obtaining or reviewing history; documenting in the medical record; reviewing/ordering tests, medications or procedures; communicating with other healthcare professionals and discussing with patient's family/caregivers.     Current Length of Stay: 2 day(s)  Current Patient Status: Inpatient   Certification Statement: The patient will continue to require additional inpatient hospital stay due to AMS, Leucocytosis, ESRD  Discharge Plan: Anticipate discharge in 48-72 hrs to Pending course    Code Status: Level 1 - Full Code    Subjective:   Patient Continues to  remain confused not following commands, not eating and agitated intermittently. Objective:     Vitals:   Temp (24hrs), Av °F (36.7 °C), Min:97.3 °F (36.3 °C), Max:98.5 °F (36.9 °C)    Temp:  [97.3 °F (36.3 °C)-98.5 °F (36.9 °C)] 98.5 °F (36.9 °C)  HR:  [70-79] 79  Resp:  [18] 18  BP: (105-157)/(54-61) 157/61  SpO2:  [92 %-98 %] 92 %  Body mass index is 33.45 kg/m². Input and Output Summary (last 24 hours): Intake/Output Summary (Last 24 hours) at 2023 1456  Last data filed at 2023 2100  Gross per 24 hour   Intake 240 ml   Output --   Net 240 ml       Physical Exam:   Physical Exam  Constitutional:       Appearance: Normal appearance. HENT:      Head: Normocephalic and atraumatic. Eyes:      Extraocular Movements: Extraocular movements intact. Pupils: Pupils are equal, round, and reactive to light. Cardiovascular:      Rate and Rhythm: Normal rate and regular rhythm. Heart sounds: No murmur heard. No gallop. Pulmonary:      Effort: Pulmonary effort is normal.      Breath sounds: Normal breath sounds. Abdominal:      General: Bowel sounds are normal.      Palpations: Abdomen is soft. Tenderness: There is no abdominal tenderness. Musculoskeletal:         General: No swelling or deformity. Normal range of motion. Cervical back: Normal range of motion and neck supple. Skin:     General: Skin is warm and dry. Neurological:      General: No focal deficit present. Mental Status: He is alert.        Additional Data:     Labs:  Results from last 7 days   Lab Units 23  0546   WBC Thousand/uL 7.42   HEMOGLOBIN g/dL 11.8*   HEMATOCRIT % 37.0   PLATELETS Thousands/uL 195   NEUTROS PCT % 82*   LYMPHS PCT % 7*   MONOS PCT % 9   EOS PCT % 1     Results from last 7 days   Lab Units 23  0546 23  0531 23  1053   SODIUM mmol/L 134*   < > 134*   POTASSIUM mmol/L 4.1   < > 4.1   CHLORIDE mmol/L 94*   < > 94*   CO2 mmol/L 28   < > 32   BUN mg/dL 49*   < > 38*   CREATININE mg/dL 7.50*   < > 7.48*   ANION GAP mmol/L 12   < > 8   CALCIUM mg/dL 8.8   < > 10.2   ALBUMIN g/dL  --   --  3.7   TOTAL BILIRUBIN mg/dL  --   --  0.68   ALK PHOS U/L  --   --  69   ALT U/L  --   --  14   AST U/L  --   --  35   GLUCOSE RANDOM mg/dL 134   < > 136    < > = values in this interval not displayed.      Results from last 7 days   Lab Units 08/19/23  1053   INR  1.16     Results from last 7 days   Lab Units 08/21/23  1131 08/21/23  0724 08/20/23  2049 08/20/23  1550 08/20/23  1152 08/20/23  0700 08/19/23  1844 08/19/23  1042   POC GLUCOSE mg/dl 134 137 168* 143* 112 120 107 157*               Lines/Drains:  Invasive Devices     Peripheral Intravenous Line  Duration           Peripheral IV 08/20/23 Distal;Left;Upper;Ventral (anterior) Arm <1 day          Hemodialysis Catheter  Duration           HD Permanent Double Catheter 570 days                      Imaging: Reviewed radiology reports from this admission including: CT head    Recent Cultures (last 7 days):         Last 24 Hours Medication List:   Current Facility-Administered Medications   Medication Dose Route Frequency Provider Last Rate   • acetaminophen  650 mg Oral Q6H PRN Robert Lopez MD     • acetaminophen  975 mg Oral Q8H Robert Lopez MD     • atorvastatin  80 mg Oral Daily Robert Lopez MD     • carvedilol  12.5 mg Oral BID Robert Lopez MD     • docusate sodium  100 mg Oral BID Robert Lopez MD     • famotidine  10 mg Oral Daily Robert Lopez MD     • HYDROmorphone  1 mg Intravenous Q4H PRN Robert Lopez MD     • insulin lispro  1-5 Units Subcutaneous HS Robert Lopez MD     • insulin lispro  2-12 Units Subcutaneous TID AC Robert Lopez MD     • lidocaine  1 patch Topical Daily Robert Lopez MD     • LORazepam  0.5 mg Intravenous Q6H PRN Robert Lopez MD     • magnesium Oxide  400 mg Oral BID Robert Lopez MD     • methocarbamol  500 mg Oral Q12H PRN Robert Lopez, MD     • NIFEdipine  30 mg Oral BID Ivan Spring MD     • ondansetron  4 mg Intravenous Q6H PRN Ivan Spring MD     • oxyCODONE  5 mg Oral Q8H PRN Ivan Spring MD     • sevelamer  800 mg Oral TID With Meals Ivan Spring MD          Today, Patient Was Seen By: Ivan Spring MD    **Please Note: This note may have been constructed using a voice recognition system. **

## 2023-08-21 NOTE — CONSULTS
Consult - Podiatry   Natalee Tang 58 y.o. male MRN: [id number]  Unit/Bed#: [address] [place] [address] Encounter: [id number]    Assessment/Plan     Assessment:  Brendan Deluna grade 3 ulcer submetatarsal 1 right foot. Diabetic neuropathy. Peripheral artery disease. Rule out osteomyelitis first ray right foot. 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 Delgadillo grade 1 ulcer submetatarsal 1 left foot. Mycosis of nail. History lesser toe amputation left foot. Plan:  Chart reviewed. X-rays reviewed. Patient seen at bedside. At this time bedside debridement performed of bilateral foot. Utilizing 10 blade excisional debridement was performed. Right foot was addressed first with all devitalized tissue including skin subcutaneous tissue, phlegmon and hyperkeratosis removed down to bleeding base. Wound extends to bone. Deep cultures taken. Dry sterile dressing applied. Wound care orders placed. At this time we need to rule out osteomyelitis of the first ray of the right foot, MRI ordered. In addition patient has some degree of peripheral artery disease. Arterial Doppler testing ordered. Excisional debridement performed of left foot ulcer as well. Utilizing 10 blade all devitalized tissue removed down to bleeding base. Wound care orders placed. Patient at high risk of ray resection of the right foot. Obviously this is pending work-up of MRI and arterial status. However patient will need medical clearance and clarity of mind prior to any surgical intervention. Podiatry will follow. History of Present Illness     HPI:  Natalee Tang is a 58 y.o. male who presents with bilateral foot wounds. Patient is poor historian. He is semilucid and seen at bedside. He is a poor historian at this time. .    Consults  Review of Systems   Constitutional: Negative. HENT: Negative. Eyes: Negative. Respiratory: Negative. Cardiovascular: Negative. Gastrointestinal: Negative. Musculoskeletal: Pes cavus noted bilateral.  Severe hallux valgus with osteoarthritis first MPJ bilateral.  Patient demonstrates absent/amputation of fifth left toe. Skin: Thickened turgor of skin noted. All nails are dystrophic. Mycotic. Neurological: Negative. Patient has profound hypoesthesia of the foot bilateral.  Psych: negative. Historical Information   Past Medical History:   Diagnosis Date   • CKD    • Diabetes mellitus ([address])    • Hypertension    • Left toe amputee ([address])    • TIA (transient ischemic attack)      Past Surgical History:   Procedure Laterality Date   • IR TUNNELED DIALYSIS CATHETER PLACEMENT  1/27/2022     Social History   Social History     Substance and Sexual Activity   Alcohol Use Not Currently   • Alcohol/week: 0.0 standard drinks of alcohol    Comment: 0     Social History     Substance and Sexual Activity   Drug Use Never     Social History     Tobacco Use   Smoking Status Never   • Passive exposure: Never   Smokeless Tobacco Never     Family History: History reviewed. No pertinent family history.     Meds/Allergies   Medications Prior to Admission   Medication   • acetaminophen (TYLENOL) 325 mg tablet   • atorvastatin (LIPITOR) 80 mg tablet   • carvedilol (COREG) 6.25 mg tablet   • clopidogrel (PLAVIX) 75 mg tablet   • docusate sodium (COLACE) 100 mg capsule   • famotidine (PEPCID) 10 mg tablet   • lidocaine (LIDODERM) 5 %   • linaGLIPtin 5 MG TABS   • methocarbamol (ROBAXIN) 500 mg tablet   • NIFEdipine ER (ADALAT CC) 30 MG 24 hr tablet   • oxyCODONE (ROXICODONE) 5 immediate release tablet   • predniSONE 10 mg tablet   • sevelamer (RENAGEL) 800 mg tablet   • allopurinol (ZYLOPRIM) 100 mg tablet   • naloxone (NARCAN) 4 mg/0.1 mL nasal spray     No Known Allergies    Objective   First Vitals:   Blood Pressure: (!) 180/77 (08/19/23 1030)  Pulse: 70 (08/19/23 1030)  Temperature: 98.7 °F (37.1 °C) (08/19/23 1030)  Temp Source: Oral (08/19/23 1030)  Respirations: 18 (08/19/23 1030)  Height: 6' 1" (185.4 cm) (08/19/23 1423)  Weight - Scale: 115 kg (253 lb 8.5 oz) (08/19/23 1030)  SpO2: 99 % (08/19/23 1030)    Current Vitals:   Blood Pressure: 147/60 (08/21/23 1607)  Pulse: 72 (08/21/23 1607)  Temperature: 98.4 °F (36.9 °C) (08/21/23 1607)  Temp Source: Oral (08/21/23 1607)  Respirations: 20 (08/21/23 1607)  Height: 6' 1" (185.4 cm) (08/19/23 1423)  Weight - Scale: 115 kg (253 lb 8.5 oz) (08/19/23 1335)  SpO2: 92 % (08/21/23 1607)        /60 (BP Location: Left arm)   Pulse 72   Temp 98.4 °F (36.9 °C) (Oral)   Resp 20   Ht 6' 1" (1.854 m)   Wt 115 kg (253 lb 8.5 oz)   SpO2 92%   BMI 33.45 kg/m²      General Appearance:    Alert, cooperative, no distress   Head:    Normocephalic, without obvious abnormality, atraumatic   Eyes:    PERRL, conjunctiva/corneas clear, EOM's intact        Nose:   Moist mucous membranes   Neck:   Supple, symmetrical, trachea midline   Back:     Symmetric   Lungs:     Respirations unlabored   Heart:    Regular rate and rhythm, S1 and S2 normal, no murmur, rub   or gallop   Abdomen:     Soft, non-tender   Extremities:  Patient has bilateral foot wounds. Right foot demonstrates a 1.2 cm grade Delgadillo grade 3 ulcer that probes to bone. Minimal undermining of the wound noted. No evidence of occult abscess or pus. Mild surrounding cellulitis noted. Left foot demonstrates a 0.5 cm grade Delgadillo grade 1 ulcer submetatarsal 1. No evidence of abscess, lymphangitis or cellulitis of this foot. Pulses:  Decreased pulses bilateral.  Negative digital hair. 2-second capillary fill time noted   Skin:  All nails are dystrophic. Neurologic:   Gross sensation is diminished. Protective sensation is absent.            Lab Results:   Admission on 08/19/2023   Component Date Value   • POC Glucose 08/19/2023 157 (H)    • WBC 08/19/2023 14.01 (H)    • RBC 08/19/2023 3.85 (L)    • Hemoglobin 08/19/2023 11.4 (L)    • Hematocrit 08/19/2023 35.5 (L)    • MCV 08/19/2023 92    • MCH 08/19/2023 29.6    • MCHC 08/19/2023 32.1    • RDW 08/19/2023 15.9 (H)    • MPV 08/19/2023 9.8    • Platelets 42/31/9848 213    • nRBC 08/19/2023 0    • Neutrophils Relative 08/19/2023 80 (H)    • Immat GRANS % 08/19/2023 1    • Lymphocytes Relative 08/19/2023 8 (L)    • Monocytes Relative 08/19/2023 11    • Eosinophils Relative 08/19/2023 0    • Basophils Relative 08/19/2023 0    • Neutrophils Absolute 08/19/2023 11.21 (H)    • Immature Grans Absolute 08/19/2023 0.09    • Lymphocytes Absolute 08/19/2023 1.08    • Monocytes Absolute 08/19/2023 1.53 (H)    • Eosinophils Absolute 08/19/2023 0.06    • Basophils Absolute 08/19/2023 0.04    • Sodium 08/19/2023 134 (L)    • Potassium 08/19/2023 4.1    • Chloride 08/19/2023 94 (L)    • CO2 08/19/2023 32    • ANION GAP 08/19/2023 8    • BUN 08/19/2023 38 (H)    • Creatinine 08/19/2023 7.48 (H)    • Glucose 08/19/2023 136    • Calcium 08/19/2023 10.2    • AST 08/19/2023 35    • ALT 08/19/2023 14    • Alkaline Phosphatase 08/19/2023 69    • Total Protein 08/19/2023 7.8    • Albumin 08/19/2023 3.7    • Total Bilirubin 08/19/2023 0.68    • eGFR 08/19/2023 6    • Magnesium 08/19/2023 1.8 (L)    • Phosphorus 08/19/2023 1.8 (L)    • Total CK 08/19/2023 1,057 (H)    • Protime 08/19/2023 14.9 (H)    • INR 08/19/2023 1.16    • PTT 08/19/2023 33    • MRSA Culture Only 08/19/2023 No Methicillin Resistant Staphlyococcus aureus (MRSA) isolated    • POC Glucose 08/19/2023 107    • Magnesium 08/20/2023 2.0    • Sodium 08/20/2023 134 (L)    • Potassium 08/20/2023 3.9    • Chloride 08/20/2023 94 (L)    • CO2 08/20/2023 31    • ANION GAP 08/20/2023 9    • BUN 08/20/2023 27 (H)    • Creatinine 08/20/2023 5.15 (H)    • Glucose 08/20/2023 98    • Calcium 08/20/2023 9.3    • eGFR 08/20/2023 10    • WBC 08/20/2023 11.35 (H)    • RBC 08/20/2023 4.00    • Hemoglobin 08/20/2023 11.7 (L)    • Hematocrit 08/20/2023 37.3    • MCV 08/20/2023 93    • MCH 08/20/2023 29.3    • MCHC 08/20/2023 31.4    • RDW 08/20/2023 15.9 (H)    • MPV 08/20/2023 10.4    • Platelets 16/83/2061 211    • nRBC 08/20/2023 0    • Neutrophils Relative 08/20/2023 80 (H)    • Immat GRANS % 08/20/2023 1    • Lymphocytes Relative 08/20/2023 8 (L)    • Monocytes Relative 08/20/2023 9    • Eosinophils Relative 08/20/2023 2    • Basophils Relative 08/20/2023 0    • Neutrophils Absolute 08/20/2023 9.10 (H)    • Immature Grans Absolute 08/20/2023 0.08    • Lymphocytes Absolute 08/20/2023 0.94    • Monocytes Absolute 08/20/2023 1.03    • Eosinophils Absolute 08/20/2023 0.17    • Basophils Absolute 08/20/2023 0.03    • Total CK 08/20/2023 564 (H)    • POC Glucose 08/20/2023 120    • POC Glucose 08/20/2023 112    • POC Glucose 08/20/2023 143 (H)    • TSH 3RD GENERATON 08/21/2023 2. 121    • POC Glucose 08/20/2023 168 (H)    • WBC 08/21/2023 7.42    • RBC 08/21/2023 4.00    • Hemoglobin 08/21/2023 11.8 (L)    • Hematocrit 08/21/2023 37.0    • MCV 08/21/2023 93    • MCH 08/21/2023 29.5    • MCHC 08/21/2023 31.9    • RDW 08/21/2023 16.1 (H)    • MPV 08/21/2023 10.6    • Platelets 28/86/4980 195    • nRBC 08/21/2023 0    • Neutrophils Relative 08/21/2023 82 (H)    • Immat GRANS % 08/21/2023 0    • Lymphocytes Relative 08/21/2023 7 (L)    • Monocytes Relative 08/21/2023 9    • Eosinophils Relative 08/21/2023 1    • Basophils Relative 08/21/2023 1    • Neutrophils Absolute 08/21/2023 6. 11    • Immature Grans Absolute 08/21/2023 0.02    • Lymphocytes Absolute 08/21/2023 0.50 (L)    • Monocytes Absolute 08/21/2023 0.67    • Eosinophils Absolute 08/21/2023 0.07    • Basophils Absolute 08/21/2023 0.05    • Ammonia 08/21/2023 18    • Sodium 08/21/2023 134 (L)    • Potassium 08/21/2023 4.1    • Chloride 08/21/2023 94 (L)    • CO2 08/21/2023 28    • ANION GAP 08/21/2023 12    • BUN 08/21/2023 49 (H)    • Creatinine 08/21/2023 7.50 (H)    • Glucose 08/21/2023 134    • Calcium 08/21/2023 8.8    • eGFR 08/21/2023 6    • POC Glucose 08/21/2023 137    • POC Glucose 08/21/2023 134    • Ventricular Rate 08/21/2023 69    • Atrial Rate 08/21/2023 69    • TX Interval 08/21/2023 164    • QRSD Interval 08/21/2023 82    • QT Interval 08/21/2023 456    • QTC Interval 08/21/2023 488    • P Axis 08/21/2023 21    • QRS Axis 08/21/2023 -8    • T Wave Axis 08/21/2023 37    • Blood Culture 08/21/2023 Received in Microbiology Lab. Culture in Progress. • POC Glucose 08/21/2023 175 (H)              Results from last 7 days   Lab Units 08/21/23  1423   BLOOD CULTURE  Received in Microbiology Lab. Culture in Progress. Invalid input(s): "LABAEARO"            Imaging: I have personally reviewed pertinent films in PACS  EKG, Pathology, and Other Studies: I have personally reviewed pertinent reports. Code Status: Level 1 - Full Code  Advance Directive and Living Will:      Power of :    POLST:            Counseling and Coordination of care  I spent 55 minutes face-to-face with patient today. More than 50% was spent in counseling & coordination of care. Counseled patient regarding patient was counseled however his cognition is in question at this time. Mick Wall

## 2023-08-21 NOTE — NURSING NOTE
Patient received in Bilateral soft limb restraint. At 2040 controlled team call for patient due to restlessness and was to get his arms out of restraints and sat on the the of the bed. Staff attempt to reorient him and ask to him to get back in bed but he refused. Controlled team called,  Jerome Ferreira was made aware. See MD order. Bilateral arm restraints readjusted and staff frequently redirect patient throughout the night.

## 2023-08-22 ENCOUNTER — APPOINTMENT (INPATIENT)
Dept: RADIOLOGY | Facility: HOSPITAL | Age: 63
DRG: 474 | End: 2023-08-22
Payer: MEDICARE

## 2023-08-22 ENCOUNTER — APPOINTMENT (INPATIENT)
Dept: DIALYSIS | Facility: HOSPITAL | Age: 63
DRG: 474 | End: 2023-08-22
Attending: INTERNAL MEDICINE
Payer: MEDICARE

## 2023-08-22 LAB
25(OH)D3 SERPL-MCNC: 12.7 NG/ML (ref 30–100)
AMMONIA PLAS-SCNC: 17 UMOL/L (ref 18–72)
ANION GAP SERPL CALCULATED.3IONS-SCNC: 13 MMOL/L
BASOPHILS # BLD AUTO: 0.04 THOUSANDS/ÂΜL (ref 0–0.1)
BASOPHILS NFR BLD AUTO: 1 % (ref 0–1)
BUN SERPL-MCNC: 64 MG/DL (ref 5–25)
CALCIUM SERPL-MCNC: 8.9 MG/DL (ref 8.4–10.2)
CHLORIDE SERPL-SCNC: 93 MMOL/L (ref 96–108)
CO2 SERPL-SCNC: 27 MMOL/L (ref 21–32)
CREAT SERPL-MCNC: 10.01 MG/DL (ref 0.6–1.3)
CRP SERPL QL: 211.1 MG/L
EOSINOPHIL # BLD AUTO: 0.19 THOUSAND/ÂΜL (ref 0–0.61)
EOSINOPHIL NFR BLD AUTO: 2 % (ref 0–6)
ERYTHROCYTE [DISTWIDTH] IN BLOOD BY AUTOMATED COUNT: 16.4 % (ref 11.6–15.1)
ERYTHROCYTE [SEDIMENTATION RATE] IN BLOOD: 79 MM/HOUR (ref 0–19)
FERRITIN SERPL-MCNC: 2584 NG/ML (ref 24–336)
FOLATE SERPL-MCNC: 7.3 NG/ML
GFR SERPL CREATININE-BSD FRML MDRD: 4 ML/MIN/1.73SQ M
GLUCOSE SERPL-MCNC: 125 MG/DL (ref 65–140)
GLUCOSE SERPL-MCNC: 142 MG/DL (ref 65–140)
GLUCOSE SERPL-MCNC: 153 MG/DL (ref 65–140)
GLUCOSE SERPL-MCNC: 172 MG/DL (ref 65–140)
GLUCOSE SERPL-MCNC: 186 MG/DL (ref 65–140)
HCT VFR BLD AUTO: 36.2 % (ref 36.5–49.3)
HGB BLD-MCNC: 11.6 G/DL (ref 12–17)
IMM GRANULOCYTES # BLD AUTO: 0.04 THOUSAND/UL (ref 0–0.2)
IMM GRANULOCYTES NFR BLD AUTO: 1 % (ref 0–2)
IRON SATN MFR SERPL: 20 % (ref 15–50)
IRON SERPL-MCNC: 28 UG/DL (ref 50–212)
LYMPHOCYTES # BLD AUTO: 0.98 THOUSANDS/ÂΜL (ref 0.6–4.47)
LYMPHOCYTES NFR BLD AUTO: 12 % (ref 14–44)
MCH RBC QN AUTO: 29.5 PG (ref 26.8–34.3)
MCHC RBC AUTO-ENTMCNC: 32 G/DL (ref 31.4–37.4)
MCV RBC AUTO: 92 FL (ref 82–98)
MONOCYTES # BLD AUTO: 0.81 THOUSAND/ÂΜL (ref 0.17–1.22)
MONOCYTES NFR BLD AUTO: 10 % (ref 4–12)
NEUTROPHILS # BLD AUTO: 6 THOUSANDS/ÂΜL (ref 1.85–7.62)
NEUTS SEG NFR BLD AUTO: 74 % (ref 43–75)
NRBC BLD AUTO-RTO: 0 /100 WBCS
PLATELET # BLD AUTO: 230 THOUSANDS/UL (ref 149–390)
PMV BLD AUTO: 10.1 FL (ref 8.9–12.7)
POTASSIUM SERPL-SCNC: 4.6 MMOL/L (ref 3.5–5.3)
PROCALCITONIN SERPL-MCNC: 112.62 NG/ML
RBC # BLD AUTO: 3.93 MILLION/UL (ref 3.88–5.62)
RETICS # AUTO: NORMAL 10*3/UL (ref 14356–105094)
RETICS # CALC: 1.04 % (ref 0.37–1.87)
SODIUM SERPL-SCNC: 133 MMOL/L (ref 135–147)
TIBC SERPL-MCNC: 140 UG/DL (ref 250–450)
TSH SERPL DL<=0.05 MIU/L-ACNC: 1.09 UIU/ML (ref 0.45–4.5)
UIBC SERPL-MCNC: 112 UG/DL (ref 155–355)
VIT B12 SERPL-MCNC: 410 PG/ML (ref 180–914)
WBC # BLD AUTO: 8.06 THOUSAND/UL (ref 4.31–10.16)

## 2023-08-22 PROCEDURE — 85652 RBC SED RATE AUTOMATED: CPT | Performed by: STUDENT IN AN ORGANIZED HEALTH CARE EDUCATION/TRAINING PROGRAM

## 2023-08-22 PROCEDURE — 85025 COMPLETE CBC W/AUTO DIFF WBC: CPT | Performed by: INTERNAL MEDICINE

## 2023-08-22 PROCEDURE — 82140 ASSAY OF AMMONIA: CPT | Performed by: STUDENT IN AN ORGANIZED HEALTH CARE EDUCATION/TRAINING PROGRAM

## 2023-08-22 PROCEDURE — 70551 MRI BRAIN STEM W/O DYE: CPT

## 2023-08-22 PROCEDURE — 99232 SBSQ HOSP IP/OBS MODERATE 35: CPT | Performed by: PSYCHIATRY & NEUROLOGY

## 2023-08-22 PROCEDURE — 82746 ASSAY OF FOLIC ACID SERUM: CPT | Performed by: STUDENT IN AN ORGANIZED HEALTH CARE EDUCATION/TRAINING PROGRAM

## 2023-08-22 PROCEDURE — 80048 BASIC METABOLIC PNL TOTAL CA: CPT | Performed by: INTERNAL MEDICINE

## 2023-08-22 PROCEDURE — 84443 ASSAY THYROID STIM HORMONE: CPT | Performed by: STUDENT IN AN ORGANIZED HEALTH CARE EDUCATION/TRAINING PROGRAM

## 2023-08-22 PROCEDURE — 85045 AUTOMATED RETICULOCYTE COUNT: CPT | Performed by: STUDENT IN AN ORGANIZED HEALTH CARE EDUCATION/TRAINING PROGRAM

## 2023-08-22 PROCEDURE — 99232 SBSQ HOSP IP/OBS MODERATE 35: CPT | Performed by: STUDENT IN AN ORGANIZED HEALTH CARE EDUCATION/TRAINING PROGRAM

## 2023-08-22 PROCEDURE — 83540 ASSAY OF IRON: CPT | Performed by: STUDENT IN AN ORGANIZED HEALTH CARE EDUCATION/TRAINING PROGRAM

## 2023-08-22 PROCEDURE — 99232 SBSQ HOSP IP/OBS MODERATE 35: CPT | Performed by: PODIATRIST

## 2023-08-22 PROCEDURE — 84145 PROCALCITONIN (PCT): CPT | Performed by: STUDENT IN AN ORGANIZED HEALTH CARE EDUCATION/TRAINING PROGRAM

## 2023-08-22 PROCEDURE — 86140 C-REACTIVE PROTEIN: CPT | Performed by: STUDENT IN AN ORGANIZED HEALTH CARE EDUCATION/TRAINING PROGRAM

## 2023-08-22 PROCEDURE — 83550 IRON BINDING TEST: CPT | Performed by: STUDENT IN AN ORGANIZED HEALTH CARE EDUCATION/TRAINING PROGRAM

## 2023-08-22 PROCEDURE — 5A1D70Z PERFORMANCE OF URINARY FILTRATION, INTERMITTENT, LESS THAN 6 HOURS PER DAY: ICD-10-PCS | Performed by: STUDENT IN AN ORGANIZED HEALTH CARE EDUCATION/TRAINING PROGRAM

## 2023-08-22 PROCEDURE — 82728 ASSAY OF FERRITIN: CPT | Performed by: STUDENT IN AN ORGANIZED HEALTH CARE EDUCATION/TRAINING PROGRAM

## 2023-08-22 PROCEDURE — 99233 SBSQ HOSP IP/OBS HIGH 50: CPT | Performed by: STUDENT IN AN ORGANIZED HEALTH CARE EDUCATION/TRAINING PROGRAM

## 2023-08-22 PROCEDURE — 82948 REAGENT STRIP/BLOOD GLUCOSE: CPT

## 2023-08-22 PROCEDURE — 82306 VITAMIN D 25 HYDROXY: CPT | Performed by: STUDENT IN AN ORGANIZED HEALTH CARE EDUCATION/TRAINING PROGRAM

## 2023-08-22 PROCEDURE — 82607 VITAMIN B-12: CPT | Performed by: STUDENT IN AN ORGANIZED HEALTH CARE EDUCATION/TRAINING PROGRAM

## 2023-08-22 RX ORDER — METRONIDAZOLE 500 MG/1
500 TABLET ORAL EVERY 8 HOURS SCHEDULED
Status: DISCONTINUED | OUTPATIENT
Start: 2023-08-22 | End: 2023-09-01

## 2023-08-22 RX ORDER — CEFEPIME HYDROCHLORIDE 1 G/50ML
1000 INJECTION, SOLUTION INTRAVENOUS EVERY 24 HOURS
Status: DISCONTINUED | OUTPATIENT
Start: 2023-08-22 | End: 2023-08-22

## 2023-08-22 RX ORDER — AMOXICILLIN 250 MG
2 CAPSULE ORAL 2 TIMES DAILY
Status: DISCONTINUED | OUTPATIENT
Start: 2023-08-22 | End: 2023-09-06 | Stop reason: HOSPADM

## 2023-08-22 RX ORDER — CEFEPIME HYDROCHLORIDE 1 G/50ML
1000 INJECTION, SOLUTION INTRAVENOUS EVERY 24 HOURS
Status: DISCONTINUED | OUTPATIENT
Start: 2023-08-22 | End: 2023-09-01

## 2023-08-22 RX ORDER — VANCOMYCIN HYDROCHLORIDE 1 G/200ML
10 INJECTION, SOLUTION INTRAVENOUS ONCE AS NEEDED
Status: DISCONTINUED | OUTPATIENT
Start: 2023-08-22 | End: 2023-08-24

## 2023-08-22 RX ORDER — ERGOCALCIFEROL 1.25 MG/1
50000 CAPSULE ORAL WEEKLY
Status: DISCONTINUED | OUTPATIENT
Start: 2023-08-22 | End: 2023-08-22

## 2023-08-22 RX ADMIN — ATORVASTATIN CALCIUM 80 MG: 80 TABLET, FILM COATED ORAL at 09:39

## 2023-08-22 RX ADMIN — INSULIN LISPRO 1 UNITS: 100 INJECTION, SOLUTION INTRAVENOUS; SUBCUTANEOUS at 21:24

## 2023-08-22 RX ADMIN — Medication 400 MG: at 09:39

## 2023-08-22 RX ADMIN — NIFEDIPINE 30 MG: 30 TABLET, EXTENDED RELEASE ORAL at 09:38

## 2023-08-22 RX ADMIN — HEPARIN SODIUM 5000 UNITS: 5000 INJECTION INTRAVENOUS; SUBCUTANEOUS at 14:42

## 2023-08-22 RX ADMIN — INSULIN LISPRO 2 UNITS: 100 INJECTION, SOLUTION INTRAVENOUS; SUBCUTANEOUS at 07:56

## 2023-08-22 RX ADMIN — HEPARIN SODIUM 5000 UNITS: 5000 INJECTION INTRAVENOUS; SUBCUTANEOUS at 21:16

## 2023-08-22 RX ADMIN — CEFEPIME HYDROCHLORIDE 1000 MG: 1 INJECTION, SOLUTION INTRAVENOUS at 19:12

## 2023-08-22 RX ADMIN — LORAZEPAM 0.5 MG: 2 INJECTION INTRAMUSCULAR; INTRAVENOUS at 12:40

## 2023-08-22 RX ADMIN — DOCUSATE SODIUM 100 MG: 100 CAPSULE, LIQUID FILLED ORAL at 09:38

## 2023-08-22 RX ADMIN — Medication 400 MG: at 18:14

## 2023-08-22 RX ADMIN — NIFEDIPINE 30 MG: 30 TABLET, EXTENDED RELEASE ORAL at 18:14

## 2023-08-22 RX ADMIN — SENNOSIDES AND DOCUSATE SODIUM 2 TABLET: 50; 8.6 TABLET ORAL at 18:24

## 2023-08-22 RX ADMIN — CARVEDILOL 12.5 MG: 12.5 TABLET, FILM COATED ORAL at 09:39

## 2023-08-22 RX ADMIN — METRONIDAZOLE 500 MG: 500 TABLET ORAL at 18:14

## 2023-08-22 RX ADMIN — ACETAMINOPHEN 975 MG: 325 TABLET ORAL at 18:22

## 2023-08-22 RX ADMIN — CARVEDILOL 12.5 MG: 12.5 TABLET, FILM COATED ORAL at 18:14

## 2023-08-22 RX ADMIN — LIDOCAINE 5% 1 PATCH: 700 PATCH TOPICAL at 09:39

## 2023-08-22 RX ADMIN — SEVELAMER HYDROCHLORIDE 800 MG: 800 TABLET ORAL at 18:14

## 2023-08-22 RX ADMIN — VANCOMYCIN HYDROCHLORIDE 2000 MG: 10 INJECTION, POWDER, LYOPHILIZED, FOR SOLUTION INTRAVENOUS at 15:19

## 2023-08-22 RX ADMIN — ACETAMINOPHEN 975 MG: 325 TABLET ORAL at 05:15

## 2023-08-22 RX ADMIN — FAMOTIDINE 10 MG: 20 TABLET, FILM COATED ORAL at 09:39

## 2023-08-22 RX ADMIN — HEPARIN SODIUM 5000 UNITS: 5000 INJECTION INTRAVENOUS; SUBCUTANEOUS at 05:13

## 2023-08-22 NOTE — PROGRESS NOTES
NEPHROLOGY PROGRESS NOTE   Giovani Marie 58 y.o. male MRN: [id number]  Unit/Bed#: [address] [place] [address] Encounter: [id number]  Reason for Consult: Management of ESRD    ASSESSMENT AND PLAN:  57 yo man with PMH of ESRD on HD TTS p/w AMS. Nephrology is consulted for management of ESRD                PLAN:     #ESRD on HD TTS:  • Dialysis unit/days: [facility]  • Access: Right IJ PermCath  • Plan for HD today and tomorrow, see below  • Fluid restriction 1-1.5L/d  • Adjust medications to GFR<10  • Avoid opioids      #Volume status/hypertension:  • Volume:  Hypervolemic  • Blood pressure:  Borderline hypertension /57mmhg   • low-sodium die  • Coreg 12.5 twice daily  • Nifedipine 30 mg twice daily  • Fluid removal on dialysis     #Secondary Hyperparasitoidism   • On sevelamer only with meals     # Anemia of Kidney Disease  • Current hemoglobin: 11.6 mg/dL  • At goal  • Treatment:  • Transfuse for hemoglobin less than 7.0 per primary service          #AMS  • Unclear etiology   • patient is a chronic HD on TTS  • Low suspicious of uremic symptoms with ongoing dialysis however we will attempt better clearing this with dialysis today and tomorrow    SUBJECTIVE:  Patient seen and examined at bedside, lethargic.   No events overnight    OBJECTIVE:  Current Weight: Weight - Scale: 115 kg (253 lb 8.5 oz)  Vitals:    08/22/23 0811   BP: 146/57   Pulse: 71   Resp:    Temp: 98.4 °F (36.9 °C)   SpO2: (!) 87%       Intake/Output Summary (Last 24 hours) at 8/22/2023 0925  Last data filed at 8/22/2023 0601  Gross per 24 hour   Intake 240 ml   Output --   Net 240 ml     Wt Readings from Last 3 Encounters:   08/19/23 115 kg (253 lb 8.5 oz)   07/31/22 102 kg (225 lb 6.4 oz)   07/06/22 106 kg (233 lb 11 oz)     Temp Readings from Last 3 Encounters:   08/22/23 98.4 °F (36.9 °C)   07/31/22 98.4 °F (36.9 °C) (Temporal)   07/13/22 (!) 97 °F (36.1 °C) (Oral)     BP Readings from Last 3 Encounters:   08/22/23 146/57   07/31/22 118/60   07/14/22 139/52     Pulse Readings from Last 3 Encounters:   08/22/23 71   07/31/22 61   07/14/22 65        General:  no acute distress at this time  Skin:  No acute rash  Eyes:  No scleral icterus and noninjected  ENT:  mucous membranes moist  Neck:  no carotid bruits  Chest:  Clear to auscultation percussion, good respiratory effort, no use of accessory respiratory muscles  CVS:  Regular rate and rhythm without rub   Abdomen:  soft and nontender   Extremities: lower extremity edema  Neuro: Unable to assess due to AMS  Psych: Lethargic  Dialysis access: Right IJ PermCath      Medications:    Current Facility-Administered Medications:   •  acetaminophen (TYLENOL) tablet 650 mg, 650 mg, Oral, Q6H PRN, Joel Dalton MD, 650 mg at 08/20/23 1141  •  acetaminophen (TYLENOL) tablet 975 mg, 975 mg, Oral, Q8H, Joel Dalton MD, 975 mg at 08/22/23 0515  •  atorvastatin (LIPITOR) tablet 80 mg, 80 mg, Oral, Daily, Diego Dalton MD, 80 mg at 08/21/23 0950  •  carvedilol (COREG) tablet 12.5 mg, 12.5 mg, Oral, BID, Aleks Lazcano MD, 12.5 mg at 08/21/23 1729  •  docusate sodium (COLACE) capsule 100 mg, 100 mg, Oral, BID, Aleks Lazcano MD, 100 mg at 08/21/23 1729  •  famotidine (PEPCID) tablet 10 mg, 10 mg, Oral, Daily, Aleks Lazcano MD, 10 mg at 08/21/23 0950  •  heparin (porcine) subcutaneous injection 5,000 Units, 5,000 Units, Subcutaneous, Q8H [address], Aleks Lazcano MD, 5,000 Units at 08/22/23 0513  •  HYDROmorphone (DILAUDID) injection 1 mg, 1 mg, Intravenous, Q4H PRN, Aleks Lazcano MD, 1 mg at 08/19/23 2054  •  insulin lispro (HumaLOG) 100 units/mL subcutaneous injection 1-5 Units, 1-5 Units, Subcutaneous, HS, Diego Dalton MD, 1 Units at 08/21/23 2127  •  insulin lispro (HumaLOG) 100 units/mL subcutaneous injection 2-12 Units, 2-12 Units, Subcutaneous, TID AC, 2 Units at 08/22/23 0756 **AND** Fingerstick Glucose (POCT), , , TID AC, Joel Dalton MD  •  lidocaine (LIDODERM) 5 % patch 1 patch, 1 patch, Topical, Daily, Sheryl Li MD, 1 patch at 08/21/23 0949  •  LORazepam (ATIVAN) injection 0.5 mg, 0.5 mg, Intravenous, Q6H PRN, Sheryl Li MD, 0.5 mg at 08/21/23 2125  •  magnesium Oxide (MAG-OX) tablet 400 mg, 400 mg, Oral, BID, Sheryl Li MD, 400 mg at 08/21/23 1729  •  methocarbamol (ROBAXIN) tablet 500 mg, 500 mg, Oral, Q12H PRN, Sheryl Li MD, 500 mg at 08/19/23 1423  •  NIFEdipine (PROCARDIA XL) 24 hr tablet 30 mg, 30 mg, Oral, BID, Sheryl Li MD, 30 mg at 08/21/23 1729  •  ondansetron (ZOFRAN) injection 4 mg, 4 mg, Intravenous, Q6H PRN, Sheryl Li MD  •  oxyCODONE (ROXICODONE) IR tablet 5 mg, 5 mg, Oral, Q8H PRN, Sheryl Li MD, 5 mg at 08/19/23 1423  •  sevelamer (RENAGEL) tablet 800 mg, 800 mg, Oral, TID With Meals, Sheryl Li MD, 800 mg at 08/21/23 1710    Laboratory Results:  Results from last 7 days   Lab Units 08/22/23  0516 08/21/23  0546 08/20/23  0531 08/19/23  1053   WBC Thousand/uL 8.06 7.42 11.35* 14.01*   HEMOGLOBIN g/dL 11.6* 11.8* 11.7* 11.4*   HEMATOCRIT % 36.2* 37.0 37.3 35.5*   PLATELETS Thousands/uL 230 195 211 213   SODIUM mmol/L 133* 134* 134* 134*   POTASSIUM mmol/L 4.6 4.1 3.9 4.1   CHLORIDE mmol/L 93* 94* 94* 94*   CO2 mmol/L 27 28 31 32   BUN mg/dL 64* 49* 27* 38*   CREATININE mg/dL 10.01* 7.50* 5.15* 7.48*   CALCIUM mg/dL 8.9 8.8 9.3 10.2   MAGNESIUM mg/dL  --   --  2.0 1.8*   PHOSPHORUS mg/dL  --   --   --  1.8*       CTA head and neck w wo contrast   Final Result by Priscilla Villarreal MD (08/20 2054)      No acute intracranial abnormality. Tiny hypodensity right posterior inferior putamen, likely chronic lacunar infarct or dilated perivascular space. Negative CTA head and neck for large vessel occlusion, dissection, aneurysm, or high-grade stenosis. Possible tracheobronchomalacia. Additional chronic/incidental findings as detailed above.       The study was marked in [facility] for immediate notification. Workstation performed: [id number]         VAS lower limb arterial duplex, complete bilateral    (Results Pending)   XR foot 2 vw left    (Results Pending)   XR foot 3+ vw right    (Results Pending)   XR chest portable    (Results Pending)   MRI brain wo contrast    (Results Pending)   MRI foot/forefoot toes right wo contrast    (Results Pending)       Portions of the record may have been created with voice recognition software. Occasional wrong word or "sound a like" substitutions may have occurred due to the inherent limitations of voice recognition software. Read the chart carefully and recognize, using context, where substitutions have occurred.

## 2023-08-22 NOTE — ASSESSMENT & PLAN NOTE
- Unclear etiology of fall.   - Patient will need PT/OT evaluation.   - CTH, CT cervical spine, CT A/P and CT lumbar recons ordered in ED but patient refused. - He did have CTA head and neck completed which demonstrated no acute intracranial abnormality, tiny hypodensity right posterior inferior putamen, likely chronic lacunar infarct or dilated perivascular space, negative CTA head and neck for large vessel occlusion, dissection, aneurysm, or high-grade stenosis.

## 2023-08-22 NOTE — PROGRESS NOTES
[address]  Progress Note  Name: Carlos Yoo  MRN: [id number]  Unit/Bed#: 913 [facility] 421-01 I Date of Admission: 8/19/2023   Date of Service: 8/22/2023 I Hospital Day: 3    Assessment/Plan   * Altered mental status  Assessment & Plan  Patient has been confused and more agitated and not following commands and refusing all treatment today. Pending urine drug screen  Neurology consult pending at the current time-based on further course might need to consider MRI of the brain 2. Patient has known history of TIAs  CT of the head and neck with no acute intracranial abnormality with tiny hypodensity in the right posterior inferior putamen likely chronic lacunar infarct dilated prevascular space. Negative CTA for large vessel occlusion dissection or aneurysm with possible tracheobronchomalacia  Discussed with patient's friend-no history of alcohol use or drug use  Ammonia and TSH level were normal  Patient continues to remain intermittently agitated, confused, not following commands  Psychiatry was consulted-recommending Haldol 2 mg p.o. every 4 hours as needed for agitation at 2 mg IM as needed for severe agitation. We will get baseline EKG for QTc  Started septic work-up also as patient noted to have deep left foot wound-patient has been afebrile and had white count initially which has since normalized.   Patient's friend reported similar episode of confusion and agitation and hospitalization for an infection about 6 months  Suspect most symptoms 2/2 infection see below  F/U B12, folate, tsh, ammonia, tsh    Diabetic ulcer of left midfoot associated with type 2 diabetes mellitus, limited to breakdown of skin [facility])  Assessment & Plan  Lab Results   Component Value Date    HGBA1C 6.6 (H) 07/07/2022       Recent Labs     08/21/23  1654 08/21/23  2116 08/22/23  0718 08/22/23  1129   POCGLU 175* 160* 172* 125       Blood Sugar Average: Last 72 hrs:  (P) 142.5     Podiatry consulted for [employer] grade 1 ulcer submetatarsal 1 left foot. S/P Debridement  Noted to have elevated CRP, procalcitonin,  1 of 2 blood cultures growing G- rods and G+ cocci in chains  Will start vanco and cefepime  F/U Final blood culture and wound cultures  MRI foot pending    Diabetes mellitus type 2 with peripheral artery disease [facility])  Assessment & Plan  Lab Results   Component Value Date    HGBA1C 6.6 (H) 07/07/2022       Recent Labs     08/21/23  1654 08/21/23  2116 08/22/23  0718 08/22/23  1129   POCGLU 175* 160* 172* 125       Blood Sugar Average: Last 72 hrs:  (P) 142.5    Low back pain  Assessment & Plan  Patient has history of compression fracture of the T10  Currently complaining of severe lower back pain but refusing CAT scan of the lumbar spine or any imaging  Pain control with Lidoderm, Tylenol, oxycodone and Dilaudid for breakthrough pain  PT/OT evaluation and treatment    Status post fall  Assessment & Plan  Patient sustained a fall about 2 days ago  Etiology not clear-EKG was unremarkable. Blood sugar was 157. Questionable hypoglycemia versus accidental fall. Patient not exhibiting any signs of stroke. Telemetry showed no evidence of arrhythmia  Patient does not remember how he fell. Patient was found on the floor and could not get up. We will get PT/OT evaluation and treatment  CAT scan of the brain, CT chest abdomen pelvis, cervical spine and lumbar reconstructions study was ordered in the ED but patient refused the studies  Patient got CT of the head and neck after receiving Ativan which was unremarkable.     Hypertension  Assessment & Plan  Continue Procardia 30 mg p.o. twice daily, Coreg 12.5 mg p.o. twice daily    Type 2 diabetes mellitus, without long-term current use of insulin [facility])  Assessment & Plan  Lab Results   Component Value Date    HGBA1C 6.6 (H) 07/07/2022       Recent Labs     08/21/23  1654 08/21/23  2116 08/22/23  0718 08/22/23  1129   POCGLU 175* 160* 172* 125       Blood Sugar Average: Last 72 hrs:  (P) 142.5   Patient is on linagliptin at home. Continue Humalog sliding scale with Accu-Cheks before every meal and at bedtime. Patient has been a diabetic diet    ESRD (end stage renal disease) [facility])  Assessment & Plan  Lab Results   Component Value Date    EGFR 4 2023    EGFR 6 2023    EGFR 10 2023    CREATININE 10.01 (H) 2023    CREATININE 7.50 (H) 2023    CREATININE 5.15 (H) 2023   Patient is on  schedule at [address]    S/P HD today cut short for MRI  Plan for HD tomorrow               VTE Pharmacologic Prophylaxis: VTE Score: 2 Moderate Risk (Score 3-4) - Pharmacological DVT Prophylaxis Ordered: heparin. Patient Centered Rounds: I performed bedside rounds with nursing staff today. Discussions with Specialists or Other Care Team Provider: nephro    Education and Discussions with Family / Patient: Updated  (friend) via phone. Total Time Spent on Date of Encounter in care of patient: 45 minutes This time was spent on one or more of the following: performing physical exam; counseling and coordination of care; obtaining or reviewing history; documenting in the medical record; reviewing/ordering tests, medications or procedures; communicating with other healthcare professionals and discussing with patient's family/caregivers. Current Length of Stay: 3 day(s)  Current Patient Status: Inpatient   Certification Statement: The patient will continue to require additional inpatient hospital stay due to altered mental status  Discharge Plan: Anticipate discharge in 48-72 hrs to discharge location to be determined pending rehab evaluations. Code Status: Level 1 - Full Code    Subjective:   Patient seen examined at bedside. Appears to be confused. Denies any pain. Unable to state what is specifically bothering him.     Objective:     Vitals:   Temp (24hrs), Av.1 °F (36.7 °C), Min:97.8 °F (36.6 °C), Max:98.4 °F (36.9 °C)    Temp:  [97.8 °F (36.6 °C)-98.4 °F (36.9 °C)] 98 °F (36.7 °C)  HR:  [63-76] 75  Resp:  [18-22] 18  BP: ()/(43-94) 148/58  SpO2:  [85 %-92 %] 87 %  Body mass index is 33.45 kg/m². Input and Output Summary (last 24 hours): Intake/Output Summary (Last 24 hours) at 8/22/2023 1326  Last data filed at 8/22/2023 1316  Gross per 24 hour   Intake 740 ml   Output 1650 ml   Net -910 ml       Physical Exam:   Physical Exam  Vitals and nursing note reviewed. Constitutional:       General: He is not in acute distress. Appearance: He is well-developed. He is obese. He is ill-appearing. HENT:      Head: Normocephalic and atraumatic. Eyes:      Conjunctiva/sclera: Conjunctivae normal.   Cardiovascular:      Rate and Rhythm: Normal rate and regular rhythm. Heart sounds: No murmur heard. Pulmonary:      Effort: Pulmonary effort is normal. No respiratory distress. Breath sounds: Normal breath sounds. Abdominal:      Palpations: Abdomen is soft. Tenderness: There is no abdominal tenderness. Musculoskeletal:         General: No swelling. Cervical back: Neck supple. Comments: Temporary hd cath right subclavian    Skin:     General: Skin is warm and dry. Capillary Refill: Capillary refill takes less than 2 seconds. Neurological:      Mental Status: He is alert. He is disoriented.    Psychiatric:         Mood and Affect: Mood normal.          Additional Data:     Labs:  Results from last 7 days   Lab Units 08/22/23  0516   WBC Thousand/uL 8.06   HEMOGLOBIN g/dL 11.6*   HEMATOCRIT % 36.2*   PLATELETS Thousands/uL 230   NEUTROS PCT % 74   LYMPHS PCT % 12*   MONOS PCT % 10   EOS PCT % 2     Results from last 7 days   Lab Units 08/22/23  0516 08/20/23  0531 08/19/23  1053   SODIUM mmol/L 133*   < > 134*   POTASSIUM mmol/L 4.6   < > 4.1   CHLORIDE mmol/L 93*   < > 94*   CO2 mmol/L 27   < > 32   BUN mg/dL 64*   < > 38*   CREATININE mg/dL 10.01*   < > 7.48*   ANION GAP mmol/L 13   < > 8   CALCIUM mg/dL 8.9   < > 10.2   ALBUMIN g/dL  --   --  3.7   TOTAL BILIRUBIN mg/dL  --   --  0.68   ALK PHOS U/L  --   --  69   ALT U/L  --   --  14   AST U/L  --   --  35   GLUCOSE RANDOM mg/dL 153*   < > 136    < > = values in this interval not displayed. Results from last 7 days   Lab Units 08/19/23  1053   INR  1.16     Results from last 7 days   Lab Units 08/22/23  1129 08/22/23  0718 08/21/23  2116 08/21/23  1654 08/21/23  1131 08/21/23  0724 08/20/23  2049 08/20/23  1550 08/20/23  1152 08/20/23  0700 08/19/23  1844 08/19/23  1042   POC GLUCOSE mg/dl 125 172* 160* 175* 134 137 168* 143* 112 120 107 157*         Results from last 7 days   Lab Units 08/22/23  1053   PROCALCITONIN ng/ml 112.62*       Lines/Drains:  Invasive Devices     Peripheral Intravenous Line  Duration           Peripheral IV 08/20/23 Distal;Left;Upper;Ventral (anterior) Arm 1 day          Hemodialysis Catheter  Duration           HD Permanent Double Catheter 571 days                      Imaging: Reviewed radiology reports from this admission including: MRI brain    Recent Cultures (last 7 days):   Results from last 7 days   Lab Units 08/21/23  1756 08/21/23  1627 08/21/23  1423   BLOOD CULTURE   --   --  Received in Microbiology Lab. Culture in Progress.    GRAM STAIN RESULT  1+ Polys*  1+ Gram positive cocci in pairs*  1+ Gram negative rods* Gram negative rods*  Gram positive cocci in chains*  --    WOUND CULTURE  Culture too young- will reincubate  --   --        Last 24 Hours Medication List:   Current Facility-Administered Medications   Medication Dose Route Frequency Provider Last Rate   • acetaminophen  650 mg Oral Q6H PRN Trang Rodriguez MD     • acetaminophen  975 mg Oral Q8H Joel Dalton MD     • atorvastatin  80 mg Oral Daily Trang Rodriguez MD     • carvedilol  12.5 mg Oral BID Joel Dalton MD     • cefepime  1,000 mg Intravenous Q24H Jaren Méndez DO     • famotidine  10 mg Oral Daily Joel MD Sha     • heparin (porcine)  5,000 Units Subcutaneous Q8H [address] Andrei Webster MD     • HYDROmorphone  1 mg Intravenous Q4H PRN Andrei Webster MD     • insulin lispro  1-5 Units Subcutaneous HS Andrei Webster MD     • insulin lispro  2-12 Units Subcutaneous TID AC Andrei Webster MD     • lidocaine  1 patch Topical Daily Andrei Webster MD     • LORazepam  0.5 mg Intravenous Q6H PRN Andrei Webster MD     • magnesium Oxide  400 mg Oral BID Andrei Webster MD     • methocarbamol  500 mg Oral Q12H PRN Andrei Webster MD     • NIFEdipine  30 mg Oral BID Andrei Webster MD     • ondansetron  4 mg Intravenous Q6H PRN Andrei Webster MD     • oxyCODONE  5 mg Oral Q8H PRN Andrei Webster MD     • senna-docusate sodium  2 tablet Oral BID Jaren Méndez DO     • sevelamer  800 mg Oral TID With Meals Andrei Webster MD     • vancomycin  15 mg/kg Intravenous Q12H Jaren Méndez DO          Today, Patient Was Seen By: Dany Nuñez DO    **Please Note: This note may have been constructed using a voice recognition system. **

## 2023-08-22 NOTE — ASSESSMENT & PLAN NOTE
Lab Results   Component Value Date    HGBA1C 6.6 (H) 07/07/2022       Recent Labs     08/21/23  1654 08/21/23  2116 08/22/23  0718 08/22/23  1129   POCGLU 175* 160* 172* 125       Blood Sugar Average: Last 72 hrs:  (P) 142.5

## 2023-08-22 NOTE — PROGRESS NOTES
Lester Thomas is a 58 y.o. male who is currently ordered Vancomycin IV with management by the Pharmacy Consult service. Relevant clinical data and objective / subjective history reviewed. Vancomycin Assessment:  Indication and Goal AUC/Trough: Bone/joint infection (goal -600, trough >10), -600, trough >10  Clinical Status: stable  Micro:     Renal Function:  SCr: 10.01 mg/dL  CrCl: 9.9 mL/min  Renal replacement: HD  Days of Therapy: 1  Current Dose: 2g IV  Vancomycin Plan:  New Dosing IV when trough < 15  Estimated AUC: 400-600 mcg*hr/mL  Estimated Trough: >10 mcg/mL  Next Level:  @ 0600  Renal Function Monitoring: Daily BMP and [place] will continue to follow closely for s/sx of nephrotoxicity, infusion reactions and appropriateness of therapy. BMP and CBC will be ordered per protocol. We will continue to follow the patient’s culture results and clinical progress daily.     Jonah Stern, Pharmacist

## 2023-08-22 NOTE — OCCUPATIONAL THERAPY NOTE
Occupational Therapy Cancellation     Patient Name: Yadira Arredondo  Today's Date: 8/22/2023  Problem List  Principal Problem:    Status post fall  Active Problems:    ESRD (end stage renal disease) ([address])    Type 2 diabetes mellitus, without long-term current use of insulin (HCC)    Hypertension    Low back pain    Traumatic rhabdomyolysis (HCC)    Leukocytosis    Electrolyte abnormality    Altered mental status    Open wound of right foot    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 mellitus, with bone involvement without evidence of necrosis ([address])    Diabetic ulcer of left midfoot associated with type 2 diabetes mellitus, limited to breakdown of skin ([address])    Diabetic polyneuropathy associated with type 2 diabetes mellitus ([address])    Diabetes mellitus type 2 with peripheral artery disease ([address])    History of amputation of lesser toe of left foot ([address])    Onychomycosis    Past Medical History  Past Medical History:   Diagnosis Date    CKD     Diabetes mellitus ([address])     Hypertension     Left toe amputee ([address])     TIA (transient ischemic attack)      Past Surgical History  Past Surgical History:   Procedure Laterality Date    IR TUNNELED DIALYSIS CATHETER PLACEMENT  1/27/2022 08/22/23 0859   Note Type   Note type Cancelled Session  (Tuesday 8/22/23)   Cancel Reasons Other  (pend MRI R forefoot/toes to rule out osteomylitis. Presently restrained. Spoke w/ PT, [employer].  Will cancel and continue to follow)     Vince Willson OTR/L  [id number]  [id number]

## 2023-08-22 NOTE — ASSESSMENT & PLAN NOTE
Lab Results   Component Value Date    EGFR 4 08/22/2023    EGFR 6 08/21/2023    EGFR 10 08/20/2023    CREATININE 10.01 (H) 08/22/2023    CREATININE 7.50 (H) 08/21/2023    CREATININE 5.15 (H) 08/20/2023   Patient is on Tuesday Thursday Saturday schedule at [facility]    S/P HD today cut short for MRI  Plan for HD tomorrow

## 2023-08-22 NOTE — ASSESSMENT & PLAN NOTE
Lab Results   Component Value Date    HGBA1C 6.6 (H) 07/07/2022       Recent Labs     08/21/23  1654 08/21/23  2116 08/22/23  0718 08/22/23  1129   POCGLU 175* 160* 172* 125       Blood Sugar Average: Last 72 hrs:  (P) 142.5   Patient is on linagliptin at home. Continue Humalog sliding scale with Accu-Cheks before every meal and at bedtime.   Patient has been a diabetic diet

## 2023-08-22 NOTE — PHYSICAL THERAPY NOTE
Physical Therapy Cancellation Note       08/22/23 0951   PT Last Visit   PT Visit Date 08/22/23   Note Type   Note Type Cancelled Session   Cancel Reasons Medical status  (Pt held today, awaiting MRI to rule out osteomyelitis of R forefoot and toes and MRI of brain. Patient currently restrained. PT speaking with ARTI Sandoval.  Will cancel and continue to follow.)   Licensure   [place] License Number  Pio Wright PT, DPT [id number]

## 2023-08-22 NOTE — ASSESSMENT & PLAN NOTE
Lab Results   Component Value Date    EGFR 4 08/22/2023    EGFR 6 08/21/2023    EGFR 10 08/20/2023    CREATININE 10.01 (H) 08/22/2023    CREATININE 7.50 (H) 08/21/2023    CREATININE 5.15 (H) 08/20/2023     - Nephrology team following.   - HD as per nephrology team.

## 2023-08-22 NOTE — PLAN OF CARE
Post-Dialysis RN Treatment Note    Blood Pressure:  Pre:  175/58 mm/Hg  Post: 148/58 mmHg   EDW: TBD    Weight:  Pre: 110.6 kg   Post: 109.5 kg   Mode of weight measurement: Bed Scale   Volume Removed: 1100 ml    Treatment duration: 120 minutes    NS given  No    Treatment shortened?  Yes, describe: Patient scheduled for MRI   Medications given during Rx None Reported   Estimated Kt/V  Not Applicable   Access type: Permacath/TDC   Access Issues: No    Report called to primary nurse   Yes, Mackenzie Nipple    Problem: METABOLIC, FLUID AND ELECTROLYTES - ADULT  Goal: Electrolytes maintained within normal limits  Description: INTERVENTIONS:  - Monitor labs and assess patient for signs and symptoms of electrolyte imbalances  - Administer electrolyte replacement as ordered  - Monitor response to electrolyte replacements, including repeat lab results as appropriate  - Instruct patient on fluid and nutrition as appropriate  Outcome: Progressing  Goal: Fluid balance maintained  Description: INTERVENTIONS:  - Monitor labs   - Monitor I/O and WT  - Instruct patient on fluid and nutrition as appropriate  - Assess for signs & symptoms of volume excess or deficit  Outcome: Progressing

## 2023-08-22 NOTE — PROGRESS NOTES
Progress Note - Podiatry  Becca Kuhn 58 y.o. male MRN: [id number]  Unit/Bed#: [address] [place] [address] Encounter: [id number]    Assessment:  [employer] grade 3 ulcer submetatarsal 1 right foot. Acquired deformity first MPJ right foot. 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 Neuropathy. Healing ulcer submetatarsal 1 left foot. Peripheral artery disease. Plan:  Chart reviewed. Patient seen at bedside. At this time patient is still semilucid. He has been advised of his condition but his cognition is limited. At this time we will continue wound care. Await work-up of osteomyelitis and peripheral artery disease. Continue wound care. Pending results of testing, patient may need arterial intervention when medically and mentally stable. Patient may be candidate for first ray resection however patient needs to be stable along with for this is proposed. Podiatry will follow. Subjective/Objective   Chief Complaint:   Chief Complaint   Patient presents with   • Fall     Pt arrives BLS from home, reported that he was found on the ground, co of lower back pain. Pt awake, follows commands. Not answers the time and year "I don't know". Daughter called 911. Pt doesn't know how he fall and how long he has been on the floor. Subjective: Patient seen at bedside however he is not lucid. He is confused. However he states he has no foot pain. Blood pressure 133/59, pulse 72, temperature (!) 97.2 °F (36.2 °C), resp. rate 18, height 6' 1" (1.854 m), weight 115 kg (253 lb 8.5 oz), SpO2 90 %. ,Body mass index is 33.45 kg/m². Invasive Devices     Peripheral Intravenous Line  Duration           Peripheral IV 08/20/23 Distal;Left;Upper;Ventral (anterior) Arm 1 day          Hemodialysis Catheter  Duration           HD Permanent Double Catheter 572 days                Physical Exam:   General appearance: alert, cooperative and no distress  Neuro/Vascular: No change in neurovascular status.   Patient demonstrates abnormal cooling of the foot bilateral.  Profound hypoesthesia of the foot bilateral.  Decrease edema noted. Nonpalpable pulses. Extremity: Left lower extremity demonstrates healing Delgadillo grade 1 ulcer submetatarsal 1. No evidence of infection. Right foot demonstrates Delgadillo grade 3 ulcer. Wound probes to bone. This physician reviewed x-rays prior to final radiologist report however there is evidence of bony erosion which could be consistent with early osteomyelitis versus Charcot foot. At this time there is no evidence of occult cellulitis of the right foot. Labs and other studies:   CBC w/diff  Results from last 7 days   Lab Units 08/22/23  0516   WBC Thousand/uL 8.06   HEMOGLOBIN g/dL 11.6*   HEMATOCRIT % 36.2*   PLATELETS Thousands/uL 230   NEUTROS PCT % 74   LYMPHS PCT % 12*   MONOS PCT % 10   EOS PCT % 2     BMP  Results from last 7 days   Lab Units 08/22/23  0516   POTASSIUM mmol/L 4.6   CHLORIDE mmol/L 93*   CO2 mmol/L 27   BUN mg/dL 64*   CREATININE mg/dL 10.01*   CALCIUM mg/dL 8.9     CMP  Results from last 7 days   Lab Units 08/22/23  0516 08/20/23  0531 08/19/23  1053   POTASSIUM mmol/L 4.6   < > 4.1   CHLORIDE mmol/L 93*   < > 94*   CO2 mmol/L 27   < > 32   BUN mg/dL 64*   < > 38*   CREATININE mg/dL 10.01*   < > 7.48*   CALCIUM mg/dL 8.9   < > 10.2   ALK PHOS U/L  --   --  69   ALT U/L  --   --  14   AST U/L  --   --  35    < > = values in this interval not displayed. @Culture@  Lab Results   Component Value Date    BLOODCX No Growth at 24 hrs. 08/21/2023    BLOODCX No Growth After 5 Days. 07/26/2022    BLOODCX No Growth After 5 Days.  07/26/2022    WOUNDCULT Culture too young- will reincubate 08/21/2023         Current Facility-Administered Medications:   •  acetaminophen (TYLENOL) tablet 650 mg, 650 mg, Oral, Q6H PRN, Joel Dalton MD, 650 mg at 08/20/23 1141  •  acetaminophen (TYLENOL) tablet 975 mg, 975 mg, Oral, Q8H, Joel Dalton MD, 975 mg at 08/22/23 0515  •  atorvastatin (LIPITOR) tablet 80 mg, 80 mg, Oral, Daily, Marizol Flanagan MD, 80 mg at 08/22/23 2618  •  carvedilol (COREG) tablet 12.5 mg, 12.5 mg, Oral, BID, Marizol Flanagan MD, 12.5 mg at 08/22/23 2809  •  cefepime (MAXIPIME) IVPB (premix in dextrose) 1,000 mg 50 mL, 1,000 mg, Intravenous, Q24H, Jaren Méndez DO  •  famotidine (PEPCID) tablet 10 mg, 10 mg, Oral, Daily, Marizol Flanagan MD, 10 mg at 08/22/23 4342  •  heparin (porcine) subcutaneous injection 5,000 Units, 5,000 Units, Subcutaneous, Q8H [facility], Deena Dalton MD, 5,000 Units at 08/22/23 1442  •  HYDROmorphone (DILAUDID) injection 1 mg, 1 mg, Intravenous, Q4H PRN, Marizol Flanagan MD, 1 mg at 08/19/23 2054  •  insulin lispro (HumaLOG) 100 units/mL subcutaneous injection 1-5 Units, 1-5 Units, Subcutaneous, HS, Marizol Flanagan MD, 1 Units at 08/21/23 2127  •  insulin lispro (HumaLOG) 100 units/mL subcutaneous injection 2-12 Units, 2-12 Units, Subcutaneous, TID AC, 2 Units at 08/22/23 0756 **AND** Fingerstick Glucose (POCT), , , TID AC, Deena Dalton MD  •  lidocaine (LIDODERM) 5 % patch 1 patch, 1 patch, Topical, Daily, Marizol Flanagan MD, 1 patch at 08/22/23 0939  •  LORazepam (ATIVAN) injection 0.5 mg, 0.5 mg, Intravenous, Q6H PRN, Marizol Flanagan MD, 0.5 mg at 08/22/23 1240  •  magnesium Oxide (MAG-OX) tablet 400 mg, 400 mg, Oral, BID, Marizol Flanagan MD, 400 mg at 08/22/23 0032  •  methocarbamol (ROBAXIN) tablet 500 mg, 500 mg, Oral, Q12H PRN, Marizol Flanagan MD, 500 mg at 08/19/23 1423  •  metroNIDAZOLE (FLAGYL) tablet 500 mg, 500 mg, Oral, Q8H KEMAL, Jaren Méndez DO  •  NIFEdipine (PROCARDIA XL) 24 hr tablet 30 mg, 30 mg, Oral, BID, Joel Dalton MD, 30 mg at 08/22/23 0938  •  ondansetron (ZOFRAN) injection 4 mg, 4 mg, Intravenous, Q6H PRN, Marizol Flanagan MD  •  oxyCODONE (ROXICODONE) IR tablet 5 mg, 5 mg, Oral, Q8H PRN, Deena Dalton MD, 5 mg at 08/19/23 1423  •  senna-docusate sodium (SENOKOT S) 8.6-50 mg per tablet 2 tablet, 2 tablet, Oral, BID, Jaren Méndez, DO  •  sevelamer (RENAGEL) tablet 800 mg, 800 mg, Oral, TID With Meals, Trang Rodriguez MD, 800 mg at 08/21/23 1710  •  vancomycin (VANCOCIN) IVPB (premix in dextrose) 1,000 mg 200 mL, 10 mg/kg (Adjusted), Intravenous, Once PRN, Jaren Méndez DO    Imaging: I have personally reviewed pertinent films in PACS  EKG, Pathology, and Other Studies: I have personally reviewed pertinent reports. VTE Pharmacologic Prophylaxis: Sequential compression device (Venodyne)   VTE Mechanical Prophylaxis: sequential compression device          Counseling and Coordination of care  I spent 25 minutes face-to-face with patient today. More than 50% was spent in counseling & coordination of care. Counseled patient regarding need for future further work-up to propose long-term treatment. Georgia Edwards DPM

## 2023-08-22 NOTE — ASSESSMENT & PLAN NOTE
- 8/21/2023 Blood cultures 1/2 positive for gram negative rods and gram positive cocci in chains. - 8/21/2023 Wound culture positive for gram positive cocci in pairs and gram negative rods.   - Defer further work-up/management to primary team.

## 2023-08-22 NOTE — ASSESSMENT & PLAN NOTE
Lab Results   Component Value Date    HGBA1C 6.6 (H) 07/07/2022       Recent Labs     08/21/23  1654 08/21/23  2116 08/22/23  0718 08/22/23  1129   POCGLU 175* 160* 172* 125       Blood Sugar Average: Last 72 hrs:  (P) 142.5     Podiatry consulted for [place]illo grade 1 ulcer submetatarsal 1 left foot.    S/P Debridement  Noted to have elevated CRP, procalcitonin,  1 of 2 blood cultures growing G- rods and G+ cocci in chains  Will start vanco and cefepime  F/U Final blood culture and wound cultures  MRI foot pending

## 2023-08-22 NOTE — PROGRESS NOTES
Progress Note - Neurology   Tatum Reeves 58 y.o. male MRN: [id number]  Unit/Bed#: [address] [place] [address] Encounter: [id number]      Assessment/Plan     Altered mental status  Assessment & Plan  58year old male with ESRD on HD, DM2, HTN, TIAs, compression fractures of C7 and T10 as well as chronic right lacunar inferior putamen infarct admitted after being found down at home. Patient has been encephalopathic and agitated while in the hospital and neurology was asked to evaluate on 8/21/2023. Etiology of symptoms is unclear, but strong suspicion for metabolic encephalopathy in setting of medical illness as well as possible infection with positive blood culture noted this AM (possible source is foot wound). Patient was calm and overall more cooperative with exam today and no focal deficit noted. Plan:   - Recommend continue to evaluate for underlying metabolic/infectious derangements as per medicine team.   - MRI brain without contrast is pending.   - MRI R foot is pending to evaluate for osteomyelitis. - Repeat Blood cultures ordered. - Monitor exam and notify with changes. ESRD (end stage renal disease) [facility])  Assessment & Plan  Lab Results   Component Value Date    EGFR 4 08/22/2023    EGFR 6 08/21/2023    EGFR 10 08/20/2023    CREATININE 10.01 (H) 08/22/2023    CREATININE 7.50 (H) 08/21/2023    CREATININE 5.15 (H) 08/20/2023     - Nephrology team following.   - HD as per nephrology team.     Hypertension  Assessment & Plan  - Goal normotension.   - Management as per medicine team.     * Status post fall  Assessment & Plan  - Unclear etiology of fall.   - Patient will need PT/OT evaluation.   - CTH, CT cervical spine, CT A/P and CT lumbar recons ordered in ED but patient refused.    - He did have CTA head and neck completed which demonstrated no acute intracranial abnormality, tiny hypodensity right posterior inferior putamen, likely chronic lacunar infarct or dilated perivascular space, negative CTA head and neck for large vessel occlusion, dissection, aneurysm, or high-grade stenosis. Traumatic rhabdomyolysis ([address])  Assessment & Plan  - s/p fall.  - CK improved following dialysis. Leukocytosis  Assessment & Plan  - 8/21/2023 Blood cultures 1/2 positive for gram negative rods and gram positive cocci in chains. - 8/21/2023 Wound culture positive for gram positive cocci in pairs and gram negative rods. - Defer further work-up/management to primary team.    Open wound of right foot  Assessment & Plan  - Podiatry team following.   - MRI foot pending to evaluate for osteomyelitis. Recommendations for outpatient neurological follow up have yet to be determined. Subjective:   Discussed with bedside RN, patient remains confused but slightly calmer than yesterday. He is continuing to refuse food. One blood culture is positive. He is pending MRI brain and foot and also with plans for dialysis today.      Patient confused and information is limited, but he reports "pain all over." He is not able to further describe his pain and notes he is not sure why he is in the hospital but he is aware that he is in the hospital.    ROS:  Unable to reliably assess secondary to mental status    Medications  Scheduled Meds:  Current Facility-Administered Medications   Medication Dose Route Frequency Provider Last Rate   • acetaminophen  650 mg Oral Q6H PRN Melva Rob MD     • acetaminophen  975 mg Oral Q8H Melva Rob MD     • atorvastatin  80 mg Oral Daily Melva Rob MD     • carvedilol  12.5 mg Oral BID Melva Rob MD     • docusate sodium  100 mg Oral BID Melva Rob MD     • famotidine  10 mg Oral Daily Melva Rob MD     • heparin (porcine)  5,000 Units Subcutaneous Q8H [address] Melva Rob MD     • HYDROmorphone  1 mg Intravenous Q4H PRN Melva Rob MD     • insulin lispro  1-5 Units Subcutaneous HS Melva Rob MD     • insulin lispro  2-12 Units Subcutaneous TID AC Aleks Lazcano MD     • lidocaine  1 patch Topical Daily Aleks Lazcano MD     • LORazepam  0.5 mg Intravenous Q6H PRN Aleks Lazcano MD     • magnesium Oxide  400 mg Oral BID Aleks Lazcano MD     • methocarbamol  500 mg Oral Q12H PRN Aleks Lazcano MD     • NIFEdipine  30 mg Oral BID Aleks Lazcano MD     • ondansetron  4 mg Intravenous Q6H PRN Aleks Lazcano MD     • oxyCODONE  5 mg Oral Q8H PRN Aleks Lazcano MD     • sevelamer  800 mg Oral TID With Meals Joel Dalton MD       Continuous Infusions:   PRN Meds:.•  acetaminophen  •  HYDROmorphone  •  LORazepam  •  methocarbamol  •  ondansetron  •  oxyCODONE    Vitals: Blood pressure 146/57, pulse 71, temperature 98.4 °F (36.9 °C), resp. rate 18, height 6' 1" (1.854 m), weight 115 kg (253 lb 8.5 oz), SpO2 (!) 87 %. ,Body mass index is 33.45 kg/m². Physical Exam: /57   Pulse 71   Temp 98.4 °F (36.9 °C)   Resp 18   Ht 6' 1" (1.854 m)   Wt 115 kg (253 lb 8.5 oz)   SpO2 (!) 87%   BMI 33.45 kg/m²   General appearance: Awake and alert, ill-appearing. Head: Normocephalic, without obvious abnormality, atraumatic  Eyes: negative findings: lids and lashes normal, conjunctivae and sclerae normal, pupils equal, round, reactive to light and accomodation and blink to threat intact B/L  Lungs: clear to auscultation bilaterally  Heart: regular rate and rhythm  Abdomen: Soft, not distended  Extremities: R foot dressing in place, clean, dry and intact  Skin: No rashes noted  Neurologic: Mental status: Awake and alert, inattentive to exam. Able to state name, location and month. Not able to state correct year or why he is in the hospital. Speech is clear without evidence of dysarthria.  Limited participation for naming objects and repeating phrases/  Cranial nerves: II: pupils equal, round, reactive to light and accommodation, III,VII: ptosis no ptosis noted, III,IV,VI: extraocular muscles extra-ocular motions intact, VII: upper facial muscle function normal bilaterally, VII: lower facial muscle function normal bilaterally, XII: tongue strength normal  Sensory: Unable to reliably assess secondary to mental status   Motor: Moving all extremities and able to follow simple commands, but not able to formally assess motor strength secondary to mental status. Labs  Recent Results (from the past 24 hour(s))   Fingerstick Glucose (POCT)    Collection Time: 08/21/23 11:31 AM   Result Value Ref Range    POC Glucose 134 65 - 140 mg/dl   ECG 12 lead    Collection Time: 08/21/23 11:42 AM   Result Value Ref Range    Ventricular Rate 69 BPM    Atrial Rate 69 BPM    AL Interval 164 ms    QRSD Interval 82 ms    QT Interval 456 ms    QTC Interval 488 ms    P [place] 21 degrees    QRS Axis -8 degrees    T Wave Axis 37 degrees   Blood culture    Collection Time: 08/21/23  2:23 PM    Specimen: Hand, Right; Blood   Result Value Ref Range    Blood Culture Received in Microbiology Lab. Culture in Progress. Blood culture    Collection Time: 08/21/23  4:27 PM    Specimen: Arm, Right; Blood   Result Value Ref Range    Gram Stain Result Gram negative rods (A)     Gram Stain Result Gram positive cocci in chains (A)    Fingerstick Glucose (POCT)    Collection Time: 08/21/23  4:54 PM   Result Value Ref Range    POC Glucose 175 (H) 65 - 140 mg/dl   Wound culture and Gram stain    Collection Time: 08/21/23  5:56 PM    Specimen: Foot, Right;  Wound   Result Value Ref Range    Gram Stain Result 1+ Polys (A)     Gram Stain Result 1+ Gram positive cocci in pairs (A)     Gram Stain Result 1+ Gram negative rods (A)    Fingerstick Glucose (POCT)    Collection Time: 08/21/23  9:16 PM   Result Value Ref Range    POC Glucose 160 (H) 65 - 140 mg/dl   CBC and differential    Collection Time: 08/22/23  5:16 AM   Result Value Ref Range    WBC 8.06 4.31 - 10.16 Thousand/uL    RBC 3.93 3.88 - 5.62 Million/uL    Hemoglobin 11.6 (L) 12.0 - 17.0 g/dL    Hematocrit 36.2 (L) 36.5 - 49.3 %    MCV 92 82 - 98 fL    MCH 29.5 26.8 - 34.3 pg    MCHC 32.0 31.4 - 37.4 g/dL    RDW 16.4 (H) 11.6 - 15.1 %    MPV 10.1 8.9 - 12.7 fL    Platelets 999 753 - 182 Thousands/uL    nRBC 0 /100 WBCs    Neutrophils Relative 74 43 - 75 %    Immat GRANS % 1 0 - 2 %    Lymphocytes Relative 12 (L) 14 - 44 %    Monocytes Relative 10 4 - 12 %    Eosinophils Relative 2 0 - 6 %    Basophils Relative 1 0 - 1 %    Neutrophils Absolute 6.00 1.85 - 7.62 Thousands/µL    Immature Grans Absolute 0.04 0.00 - 0.20 Thousand/uL    Lymphocytes Absolute 0.98 0.60 - 4.47 Thousands/µL    Monocytes Absolute 0.81 0.17 - 1.22 Thousand/µL    Eosinophils Absolute 0.19 0.00 - 0.61 Thousand/µL    Basophils Absolute 0.04 0.00 - 0.10 Thousands/µL   Basic metabolic panel    Collection Time: 08/22/23  5:16 AM   Result Value Ref Range    Sodium 133 (L) 135 - 147 mmol/L    Potassium 4.6 3.5 - 5.3 mmol/L    Chloride 93 (L) 96 - 108 mmol/L    CO2 27 21 - 32 mmol/L    ANION GAP 13 mmol/L    BUN 64 (H) 5 - 25 mg/dL    Creatinine 10.01 (H) 0.60 - 1.30 mg/dL    Glucose 153 (H) 65 - 140 mg/dL    Calcium 8.9 8.4 - 10.2 mg/dL    eGFR 4 ml/min/1.73sq m   Fingerstick Glucose (POCT)    Collection Time: 08/22/23  7:18 AM   Result Value Ref Range    POC Glucose 172 (H) 65 - 140 mg/dl     Imaging   No new neuro imaging available for review. VTE Prophylaxis: Heparin    Counseling / Coordination of Care  Total time spent today 37 minutes. Greater than 50% of total time was spent with the patient and / or family counseling and / or coordination of care. A description of the counseling / coordination of care: Time spent reviewing chart, discussing with bedside RN and also examining patient.

## 2023-08-22 NOTE — PLAN OF CARE
Problem: Potential for Falls  Goal: Patient will remain free of falls  Description: INTERVENTIONS:  - Educate patient/family on patient safety including physical limitations  - Instruct patient to call for assistance with activity   - Consult OT/PT to assist with strengthening/mobility   - Keep Call bell within reach  - Keep bed low and locked with side rails adjusted as appropriate  - Keep care items and personal belongings within reach  - Initiate and maintain comfort rounds  - Make Fall Risk Sign visible to staff  - Offer Toileting every 2 Hours, in advance of need  - Initiate/Maintain bed alarm  - Obtain necessary fall risk management equipment: yes  - Apply yellow socks and bracelet for high fall risk patients  - Consider moving patient to room near nurses station  Outcome: Progressing     Problem: MOBILITY - ADULT  Goal: Maintain or return to baseline ADL function  Description: INTERVENTIONS:  -  Assess patient's ability to carry out ADLs; assess patient's baseline for ADL function and identify physical deficits which impact ability to perform ADLs (bathing, care of mouth/teeth, toileting, grooming, dressing, etc.)  - Assess/evaluate cause of self-care deficits   - Assess range of motion  - Assess patient's mobility; develop plan if impaired  - Assess patient's need for assistive devices and provide as appropriate  - Encourage maximum independence but intervene and supervise when necessary  - Involve family in performance of ADLs  - Assess for home care needs following discharge   - Consider OT consult to assist with ADL evaluation and planning for discharge  - Provide patient education as appropriate  Outcome: Progressing  Goal: Maintains/Returns to pre admission functional level  Description: INTERVENTIONS:  - Perform BMAT or MOVE assessment daily.   - Set and communicate daily mobility goal to care team and patient/family/caregiver.    - Collaborate with rehabilitation services on mobility goals if consulted  - Perform Range of Motion 3 times a day. - Reposition patient every 2 hours.   - Dangle patient 3 times a day  - Stand patient 3 times a day  - Ambulate patient 3 times a day  - Out of bed to chair 3 times a day   - Out of bed for meals 3 times a day  - Out of bed for toileting  - Record patient progress and toleration of activity level   Outcome: Progressing     Problem: PAIN - ADULT  Goal: Verbalizes/displays adequate comfort level or baseline comfort level  Description: Interventions:  - Encourage patient to monitor pain and request assistance  - Assess pain using appropriate pain scale  - Administer analgesics based on type and severity of pain and evaluate response  - Implement non-pharmacological measures as appropriate and evaluate response  - Consider cultural and social influences on pain and pain management  - Notify physician/advanced practitioner if interventions unsuccessful or patient reports new pain  Outcome: Progressing     Problem: INFECTION - ADULT  Goal: Absence or prevention of progression during hospitalization  Description: INTERVENTIONS:  - Assess and monitor for signs and symptoms of infection  - Monitor lab/diagnostic results  - Monitor all insertion sites, i.e. indwelling lines, tubes, and drains  - Monitor endotracheal if appropriate and nasal secretions for changes in amount and color  - [place] appropriate cooling/warming therapies per order  - Administer medications as ordered  - Instruct and encourage patient and family to use good hand hygiene technique  - Identify and instruct in appropriate isolation precautions for identified infection/condition  Outcome: Progressing  Goal: Absence of fever/infection during neutropenic period  Description: INTERVENTIONS:  - Monitor WBC    Outcome: Progressing     Problem: SAFETY ADULT  Goal: Patient will remain free of falls  Description: INTERVENTIONS:  - Educate patient/family on patient safety including physical limitations  - Instruct patient to call for assistance with activity   - Consult OT/PT to assist with strengthening/mobility   - Keep Call bell within reach  - Keep bed low and locked with side rails adjusted as appropriate  - Keep care items and personal belongings within reach  - Initiate and maintain comfort rounds  - Make Fall Risk Sign visible to staff  - Offer Toileting every 2  Hours, in advance of need  - Initiate/Maintain bed alarm  - Obtain necessary fall risk management equipment: yes   - Apply yellow socks and bracelet for high fall risk patients  - Consider moving patient to room near nurses station  Outcome: Progressing  Goal: Maintain or return to baseline ADL function  Description: INTERVENTIONS:  -  Assess patient's ability to carry out ADLs; assess patient's baseline for ADL function and identify physical deficits which impact ability to perform ADLs (bathing, care of mouth/teeth, toileting, grooming, dressing, etc.)  - Assess/evaluate cause of self-care deficits   - Assess range of motion  - Assess patient's mobility; develop plan if impaired  - Assess patient's need for assistive devices and provide as appropriate  - Encourage maximum independence but intervene and supervise when necessary  - Involve family in performance of ADLs  - Assess for home care needs following discharge   - Consider OT consult to assist with ADL evaluation and planning for discharge  - Provide patient education as appropriate  Outcome: Progressing  Goal: Maintains/Returns to pre admission functional level  Description: INTERVENTIONS:  - Perform BMAT or MOVE assessment daily.   - Set and communicate daily mobility goal to care team and patient/family/caregiver. - Collaborate with rehabilitation services on mobility goals if consulted  - Perform Range of Motion 3 times a day. - Reposition patient every 2 hours.   - Dangle patient 23 times a day  - Stand patient 3 times a day  - Ambulate patient 3 times a day  - Out of bed to chair 3 times a day   - Out of bed for meals 3 times a day  - Out of bed for toileting  - Record patient progress and toleration of activity level   Outcome: Progressing     Problem: DISCHARGE PLANNING  Goal: Discharge to home or other facility with appropriate resources  Description: INTERVENTIONS:  - Identify barriers to discharge w/patient and caregiver  - Arrange for needed discharge resources and transportation as appropriate  - Identify discharge learning needs (meds, wound care, etc.)  - Arrange for interpretive services to assist at discharge as needed  - Refer to Case Management Department for coordinating discharge planning if the patient needs post-hospital services based on physician/advanced practitioner order or complex needs related to functional status, cognitive ability, or social support system  Outcome: Progressing     Problem: Knowledge Deficit  Goal: Patient/family/caregiver demonstrates understanding of disease process, treatment plan, medications, and discharge instructions  Description: Complete learning assessment and assess knowledge base.   Interventions:  - Provide teaching at level of understanding  - Provide teaching via preferred learning methods  Outcome: Progressing     Problem: METABOLIC, FLUID AND ELECTROLYTES - ADULT  Goal: Electrolytes maintained within normal limits  Description: INTERVENTIONS:  - Monitor labs and assess patient for signs and symptoms of electrolyte imbalances  - Administer electrolyte replacement as ordered  - Monitor response to electrolyte replacements, including repeat lab results as appropriate  - Instruct patient on fluid and nutrition as appropriate  Outcome: Progressing  Goal: Fluid balance maintained  Description: INTERVENTIONS:  - Monitor labs   - Monitor I/O and WT  - Instruct patient on fluid and nutrition as appropriate  - Assess for signs & symptoms of volume excess or deficit  Outcome: Progressing     Problem: Nutrition/Hydration-ADULT  Goal: Nutrient/Hydration intake appropriate for improving, restoring or maintaining nutritional needs  Description: Monitor and assess patient's nutrition/hydration status for malnutrition. Collaborate with interdisciplinary team and initiate plan and interventions as ordered. Monitor patient's weight and dietary intake as ordered or per policy. Utilize nutrition screening tool and intervene as necessary. Determine patient's food preferences and provide high-protein, high-caloric foods as appropriate.      INTERVENTIONS:  - Monitor oral intake, urinary output, labs, and treatment plans  - Assess nutrition and hydration status and recommend course of action  - Evaluate amount of meals eaten  - Assist patient with eating if necessary   - Allow adequate time for meals  - Recommend/ encourage appropriate diets, oral nutritional supplements, and vitamin/mineral supplements  - Order, calculate, and assess calorie counts as needed  - Recommend, monitor, and adjust tube feedings and TPN/PPN based on assessed needs  - Assess need for intravenous fluids  - Provide specific nutrition/hydration education as appropriate  - Include patient/family/caregiver in decisions related to nutrition  Outcome: Progressing     Problem: SAFETY,RESTRAINT: NV/NON-SELF DESTRUCTIVE BEHAVIOR  Goal: Remains free of harm/injury (restraint for non violent/non self-detsructive behavior)  Description: INTERVENTIONS:  - Instruct patient/family regarding restraint use   - Assess and monitor physiologic and psychological status   - Provide interventions and comfort measures to meet assessed patient needs   - Identify and implement measures to help patient regain control  - Assess readiness for release of restraint   Outcome: Progressing  Goal: Returns to optimal restraint-free functioning  Description: INTERVENTIONS:  - Assess the patient's behavior and symptoms that indicate continued need for restraint  - Identify and implement measures to help patient regain control  - Assess readiness for release of restraint   Outcome: Progressing     Problem: Prexisting or High Potential for Compromised Skin Integrity  Goal: Skin integrity is maintained or improved  Description: INTERVENTIONS:  - Identify patients at risk for skin breakdown  - Assess and monitor skin integrity  - Assess and monitor nutrition and hydration status  - Monitor labs   - Assess for incontinence   - Turn and reposition patient  - Assist with mobility/ambulation  - Relieve pressure over bony prominences  - Avoid friction and shearing  - Provide appropriate hygiene as needed including keeping skin clean and dry  - Evaluate need for skin moisturizer/barrier cream  - Collaborate with interdisciplinary team   - Patient/family teaching  - Consider wound care consult   Outcome: Progressing

## 2023-08-23 ENCOUNTER — APPOINTMENT (OUTPATIENT)
Dept: NON INVASIVE DIAGNOSTICS | Facility: HOSPITAL | Age: 63
DRG: 474 | End: 2023-08-23
Payer: MEDICARE

## 2023-08-23 ENCOUNTER — APPOINTMENT (INPATIENT)
Dept: DIALYSIS | Facility: HOSPITAL | Age: 63
DRG: 474 | End: 2023-08-23
Attending: INTERNAL MEDICINE
Payer: MEDICARE

## 2023-08-23 ENCOUNTER — APPOINTMENT (INPATIENT)
Dept: RADIOLOGY | Facility: HOSPITAL | Age: 63
DRG: 474 | End: 2023-08-23
Payer: MEDICARE

## 2023-08-23 LAB
ALBUMIN SERPL BCP-MCNC: 3.3 G/DL (ref 3.5–5)
ANION GAP SERPL CALCULATED.3IONS-SCNC: 11 MMOL/L
BASE EX.OXY STD BLDV CALC-SCNC: 37.8 % (ref 60–80)
BASE EXCESS BLDV CALC-SCNC: 2.4 MMOL/L
BUN SERPL-MCNC: 62 MG/DL (ref 5–25)
CALCIUM ALBUM COR SERPL-MCNC: 9 MG/DL (ref 8.3–10.1)
CALCIUM SERPL-MCNC: 8.4 MG/DL (ref 8.4–10.2)
CHLORIDE SERPL-SCNC: 94 MMOL/L (ref 96–108)
CO2 SERPL-SCNC: 28 MMOL/L (ref 21–32)
CREAT SERPL-MCNC: 9.26 MG/DL (ref 0.6–1.3)
ERYTHROCYTE [DISTWIDTH] IN BLOOD BY AUTOMATED COUNT: 16.4 % (ref 11.6–15.1)
GFR SERPL CREATININE-BSD FRML MDRD: 5 ML/MIN/1.73SQ M
GLUCOSE SERPL-MCNC: 138 MG/DL (ref 65–140)
GLUCOSE SERPL-MCNC: 166 MG/DL (ref 65–140)
GLUCOSE SERPL-MCNC: 183 MG/DL (ref 65–140)
GLUCOSE SERPL-MCNC: 194 MG/DL (ref 65–140)
GLUCOSE SERPL-MCNC: 241 MG/DL (ref 65–140)
HCO3 BLDV-SCNC: 27.3 MMOL/L (ref 24–30)
HCT VFR BLD AUTO: 31.8 % (ref 36.5–49.3)
HGB BLD-MCNC: 10.1 G/DL (ref 12–17)
MAGNESIUM SERPL-MCNC: 2.6 MG/DL (ref 1.9–2.7)
MCH RBC QN AUTO: 29.4 PG (ref 26.8–34.3)
MCHC RBC AUTO-ENTMCNC: 31.8 G/DL (ref 31.4–37.4)
MCV RBC AUTO: 92 FL (ref 82–98)
O2 CT BLDV-SCNC: 6 ML/DL
PCO2 BLDV: 43.8 MM HG (ref 42–50)
PH BLDV: 7.41 [PH] (ref 7.3–7.4)
PHOSPHATE SERPL-MCNC: 2.5 MG/DL (ref 2.3–4.1)
PLATELET # BLD AUTO: 219 THOUSANDS/UL (ref 149–390)
PMV BLD AUTO: 10.2 FL (ref 8.9–12.7)
PO2 BLDV: 23.3 MM HG (ref 35–45)
POTASSIUM SERPL-SCNC: 4.3 MMOL/L (ref 3.5–5.3)
RBC # BLD AUTO: 3.44 MILLION/UL (ref 3.88–5.62)
SODIUM SERPL-SCNC: 133 MMOL/L (ref 135–147)
VANCOMYCIN TROUGH SERPL-MCNC: 17.8 UG/ML (ref 10–20)
WBC # BLD AUTO: 8.85 THOUSAND/UL (ref 4.31–10.16)

## 2023-08-23 PROCEDURE — 36589 REMOVAL TUNNELED CV CATH: CPT

## 2023-08-23 PROCEDURE — 82805 BLOOD GASES W/O2 SATURATION: CPT | Performed by: STUDENT IN AN ORGANIZED HEALTH CARE EDUCATION/TRAINING PROGRAM

## 2023-08-23 PROCEDURE — 87077 CULTURE AEROBIC IDENTIFY: CPT | Performed by: PHYSICIAN ASSISTANT

## 2023-08-23 PROCEDURE — 85027 COMPLETE CBC AUTOMATED: CPT | Performed by: STUDENT IN AN ORGANIZED HEALTH CARE EDUCATION/TRAINING PROGRAM

## 2023-08-23 PROCEDURE — 87070 CULTURE OTHR SPECIMN AEROBIC: CPT | Performed by: PHYSICIAN ASSISTANT

## 2023-08-23 PROCEDURE — 82948 REAGENT STRIP/BLOOD GLUCOSE: CPT

## 2023-08-23 PROCEDURE — 80202 ASSAY OF VANCOMYCIN: CPT | Performed by: STUDENT IN AN ORGANIZED HEALTH CARE EDUCATION/TRAINING PROGRAM

## 2023-08-23 PROCEDURE — 87186 SC STD MICRODIL/AGAR DIL: CPT | Performed by: PHYSICIAN ASSISTANT

## 2023-08-23 PROCEDURE — 80069 RENAL FUNCTION PANEL: CPT | Performed by: STUDENT IN AN ORGANIZED HEALTH CARE EDUCATION/TRAINING PROGRAM

## 2023-08-23 PROCEDURE — 05PYX3Z REMOVAL OF INFUSION DEVICE FROM UPPER VEIN, EXTERNAL APPROACH: ICD-10-PCS | Performed by: RADIOLOGY

## 2023-08-23 PROCEDURE — 99233 SBSQ HOSP IP/OBS HIGH 50: CPT | Performed by: STUDENT IN AN ORGANIZED HEALTH CARE EDUCATION/TRAINING PROGRAM

## 2023-08-23 PROCEDURE — 99232 SBSQ HOSP IP/OBS MODERATE 35: CPT | Performed by: PSYCHIATRY & NEUROLOGY

## 2023-08-23 PROCEDURE — 73718 MRI LOWER EXTREMITY W/O DYE: CPT

## 2023-08-23 PROCEDURE — 99223 1ST HOSP IP/OBS HIGH 75: CPT | Performed by: INTERNAL MEDICINE

## 2023-08-23 PROCEDURE — 87147 CULTURE TYPE IMMUNOLOGIC: CPT | Performed by: PHYSICIAN ASSISTANT

## 2023-08-23 PROCEDURE — 90935 HEMODIALYSIS ONE EVALUATION: CPT | Performed by: STUDENT IN AN ORGANIZED HEALTH CARE EDUCATION/TRAINING PROGRAM

## 2023-08-23 PROCEDURE — 36589 REMOVAL TUNNELED CV CATH: CPT | Performed by: RADIOLOGY

## 2023-08-23 PROCEDURE — NC001 PR NO CHARGE: Performed by: RADIOLOGY

## 2023-08-23 PROCEDURE — 83735 ASSAY OF MAGNESIUM: CPT | Performed by: STUDENT IN AN ORGANIZED HEALTH CARE EDUCATION/TRAINING PROGRAM

## 2023-08-23 RX ORDER — GABAPENTIN 250 MG/5ML
200 SOLUTION ORAL 2 TIMES DAILY
Status: DISCONTINUED | OUTPATIENT
Start: 2023-08-23 | End: 2023-09-06 | Stop reason: HOSPADM

## 2023-08-23 RX ORDER — DIAZEPAM 5 MG/ML
5 INJECTION, SOLUTION INTRAMUSCULAR; INTRAVENOUS ONCE
Status: DISCONTINUED | OUTPATIENT
Start: 2023-08-23 | End: 2023-08-23

## 2023-08-23 RX ORDER — HALOPERIDOL 5 MG/ML
2 INJECTION INTRAMUSCULAR EVERY 6 HOURS PRN
Status: DISCONTINUED | OUTPATIENT
Start: 2023-08-23 | End: 2023-09-06 | Stop reason: HOSPADM

## 2023-08-23 RX ORDER — DIAZEPAM 5 MG/ML
10 INJECTION, SOLUTION INTRAMUSCULAR; INTRAVENOUS ONCE
Status: COMPLETED | OUTPATIENT
Start: 2023-08-23 | End: 2023-08-23

## 2023-08-23 RX ADMIN — CEFEPIME HYDROCHLORIDE 1000 MG: 1 INJECTION, SOLUTION INTRAVENOUS at 16:19

## 2023-08-23 RX ADMIN — DIAZEPAM 10 MG: 10 INJECTION, SOLUTION INTRAMUSCULAR; INTRAVENOUS at 13:28

## 2023-08-23 RX ADMIN — FAMOTIDINE 10 MG: 20 TABLET, FILM COATED ORAL at 12:30

## 2023-08-23 RX ADMIN — METRONIDAZOLE 500 MG: 500 TABLET ORAL at 12:30

## 2023-08-23 RX ADMIN — METRONIDAZOLE 500 MG: 500 TABLET ORAL at 02:20

## 2023-08-23 RX ADMIN — Medication 20 ML: at 15:47

## 2023-08-23 RX ADMIN — ATORVASTATIN CALCIUM 80 MG: 80 TABLET, FILM COATED ORAL at 12:30

## 2023-08-23 RX ADMIN — ACETAMINOPHEN 975 MG: 325 TABLET ORAL at 12:30

## 2023-08-23 RX ADMIN — LIDOCAINE 5% 1 PATCH: 700 PATCH TOPICAL at 12:31

## 2023-08-23 RX ADMIN — HEPARIN SODIUM 5000 UNITS: 5000 INJECTION INTRAVENOUS; SUBCUTANEOUS at 22:02

## 2023-08-23 RX ADMIN — HEPARIN SODIUM 5000 UNITS: 5000 INJECTION INTRAVENOUS; SUBCUTANEOUS at 05:15

## 2023-08-23 RX ADMIN — OXYCODONE HYDROCHLORIDE 5 MG: 5 TABLET ORAL at 08:39

## 2023-08-23 RX ADMIN — CARVEDILOL 12.5 MG: 12.5 TABLET, FILM COATED ORAL at 12:30

## 2023-08-23 RX ADMIN — HYDROMORPHONE HYDROCHLORIDE 1 MG: 1 INJECTION, SOLUTION INTRAMUSCULAR; INTRAVENOUS; SUBCUTANEOUS at 10:07

## 2023-08-23 RX ADMIN — GABAPENTIN 200 MG: 250 SUSPENSION ORAL at 12:57

## 2023-08-23 RX ADMIN — HALOPERIDOL LACTATE 2 MG: 5 INJECTION, SOLUTION INTRAMUSCULAR at 14:41

## 2023-08-23 RX ADMIN — SENNOSIDES AND DOCUSATE SODIUM 2 TABLET: 50; 8.6 TABLET ORAL at 12:30

## 2023-08-23 RX ADMIN — NIFEDIPINE 30 MG: 30 TABLET, EXTENDED RELEASE ORAL at 12:30

## 2023-08-23 RX ADMIN — ACETAMINOPHEN 975 MG: 325 TABLET ORAL at 02:19

## 2023-08-23 RX ADMIN — INSULIN LISPRO 2 UNITS: 100 INJECTION, SOLUTION INTRAVENOUS; SUBCUTANEOUS at 12:34

## 2023-08-23 RX ADMIN — HEPARIN SODIUM 5000 UNITS: 5000 INJECTION INTRAVENOUS; SUBCUTANEOUS at 16:19

## 2023-08-23 RX ADMIN — SEVELAMER HYDROCHLORIDE 800 MG: 800 TABLET ORAL at 12:30

## 2023-08-23 NOTE — ASSESSMENT & PLAN NOTE
Patient had been confused and more agitated and not following commands and refusing all treatment previous day; at baseline currently   UDS negative   Neurology consult pending at the current time-based on further course might need to consider MRI of the brain 2. Patient has known history of TIAs  CT of the head and neck with no acute intracranial abnormality with tiny hypodensity in the right posterior inferior putamen likely chronic lacunar infarct dilated prevascular space. Negative CTA for large vessel occlusion dissection or aneurysm with possible tracheobronchomalacia  Discussed with patient's friend-no history of alcohol use or drug use  Ammonia and TSH level were normal  Psychiatry was consulted-recommending Haldol 2 mg p.o. every 4 hours as needed for agitation at 2 mg IM as needed for severe agitation. Started septic work-up also as patient noted to have deep left foot wound-patient has been afebrile and had white count initially which has since normalized.   Suspect most symptoms 2/2 infection see below  B12, folate, TSH, ammonia WNL  Mentation appears to be improved; at baseline

## 2023-08-23 NOTE — CONSULTS
Consultation - Infectious Disease   Giovani Marie 58 y.o. male MRN: [id number]  Unit/Bed#: [address] [place] [address] Encounter: [id number]      IMPRESSION & RECOMMENDATIONS:   1. Polymicrobic bacteremia. In setting of #2/3/4/5, admission blood cultures 1 of 2 sets growing Granulicatella adiacens, Enterobacter cloacae, Proteus vulgaris and Bacteroides fragilis on preliminary blood culture ID panel. Patient with a permacath since 1/27/22 as well as an active right foot diabetic ulcer.  -continues empiric Vancomycin/Cefepime/Flagyl at present  -if no staph aureus growth, can hold Vancomycin.  -recommend removing permacath and place temp cath - discussed with Nephrology.  -Consulted IR for permacath removal and culture tip.  -check repeat blood cultures in am  -check TTE     2. Encephalopathy. 8/22/23 MRI Brain: limited to motion. No evident acute infarct  Increased altered mental status following HD session today.  -infectious work up/management as above  -monitor mental status  -neurology on board    3. Right diabetic foot ulcer with cellulitis and concern for deeper infection.  -continue antibiotics as above  -follow up wound culture  -local wound care  -podiatry on boad  -follow up XR and MRI Right foot    4. Leukocytosis, POA. WBC 14 K. In setting of #1/3. Downtrended  -continue antibiotics as below.  -follow-up cultures and adjust antibiotics as needed  -monitor temperature and hemodynamics  -serial exam  -recheck CBC and BMP in a.m.    5. ESRD on HD via right chest permacath placed 1/2023.   -renal dose adjust antibiotic as needed  -volume/HD management per nephrology  -recommend remove permacath and place temp cath     6. Type 2 Diabetes Mellitus.  HgbA1C 6.6  -monitor symptoms  -symptomatic management per primary care team  -advancing diet as tolerated    Antibiotics:  Vancomycin/Cefepime/Flagyl D2    I have discussed the above management plan in detail with patient, RN, Dr. Xenia Hernandez, and Dr. John Ingram of the primary service. I have spent a total time of 80 minutes on 08/23/23 in caring for this patient including Diagnostic results, Prognosis, Risks and benefits of tx options, Instructions for management, Patient and family education, Importance of tx compliance, Risk factor reductions, Impressions, Counseling / Coordination of care, Documenting in the medical record, Reviewing / ordering tests, medicine, procedures  , Obtaining or reviewing history   and Communicating with other healthcare professionals . Extensive review of the medical records in epic including review of the notes, radiographs, and laboratory results     HISTORY OF PRESENT ILLNESS:  Reason for Consult: 1. Sepsis 2. Bacteremia    HPI: Emily Bradley is a 58y.o. year old male with Diabetes mellitus, hypertension, TIA, ESRD on hemodialysis on Tuesday Thursday Saturday schedule outpatient, prior T10 compression fracture who presented to [facility] ER 8/19/23 after sustaining a fall and was found on the floor by the daughter possibly 2 days after fall and complaining of some back pain. In the ER, patient had leukocytosis. Blood cultures were obtained. Patient then became more altered during admission refusing x-rays and CAT scans. His blood culture 1 of 2 sets then turned positive for polymicrobial growth. Vancomycin, cefepime, and Flagyl were started empirically on 8/22/2023. Infectious disease is now being consulted regarding evaluation and management of sepsis and bacteremia. Patient is status post a hemodialysis session this morning and is now significantly altered with confusion and had a low-grade temp of 100.1. Patient is an uncertain historian. He is not aware of how old his permacath is. In January 2022.   He is currently denying any fever, chills, sweats, headache, dizziness, chest pain, shortness of breath, cough, nausea, vomiting, diarrhea, or any foot pain or pain in general.     REVIEW OF SYSTEMS:  A complete review of systems is negative other than that noted in the HPI. PAST MEDICAL HISTORY:  Past Medical History:   Diagnosis Date   • CKD    • Diabetes mellitus ([address])    • Hypertension    • Left toe amputee ([address])    • TIA (transient ischemic attack)      Past Surgical History:   Procedure Laterality Date   • IR TUNNELED DIALYSIS CATHETER PLACEMENT  2022       FAMILY HISTORY:  Non-contributory    SOCIAL HISTORY:  Social History   Social History     Substance and Sexual Activity   Alcohol Use Not Currently   • Alcohol/week: 0.0 standard drinks of alcohol    Comment: 0     Social History     Substance and Sexual Activity   Drug Use Never     Social History     Tobacco Use   Smoking Status Never   • Passive exposure: Never   Smokeless Tobacco Never       ALLERGIES:  No Known Allergies    MEDICATIONS:  All current active medications have been reviewed. PHYSICAL EXAM:  Temp:  [97.2 °F (36.2 °C)-100.1 °F (37.8 °C)] 100.1 °F (37.8 °C)  HR:  [60-73] 68  Resp:  [18-20] 20  BP: (120-175)/(44-87) 138/49  SpO2:  [88 %-93 %] 91 %  Temp (24hrs), Av.4 °F (36.9 °C), Min:97.2 °F (36.2 °C), Max:100.1 °F (37.8 °C)  Current: Temperature: 100.1 °F (37.8 °C)    Intake/Output Summary (Last 24 hours) at 2023 1403  Last data filed at 2023 1133  Gross per 24 hour   Intake 500 ml   Output 1100 ml   Net -600 ml       General Appearance:  58year old male, awake, confused post HD session today, fairly cooperative to exam, propped in bed, in no acute resp distress   Head:  Normocephalic, without obvious abnormality, atraumatic   Eyes:  Conjunctiva pink and sclera anicteric, both eyes   Nose: Nares normal, mucosa normal, no drainage   Throat: Oropharynx moist without lesions   Neck: Supple, symmetrical, no adenopathy, no tenderness/mass/nodules   Back:   Symmetric, no curvature, ROM normal, no CVA tenderness   Lungs:   Clear to auscultation bilaterally, respirations unlabored   Chest Wall:  No tenderness or deformity.  Right chest permacath site intact, nontender, no active drainage and get low   Heart:  RRR; no murmur, rub or gallop   Abdomen:   Soft, non-tender, non-distended, positive bowel sounds    Extremities: No cyanosis, clubbing or edema. Multiple scabbed abrasions of knees and shins. Right foot gauze/ace wrap intact with some blood-tinged dry plantar staining strikethrough. Wound photos in media section reviewed. Skin: No rashes or lesions. Left forearm IV site Ace wrapped. Lymph nodes: Cervical, supraclavicular nodes normal   Neurologic:  Awake and arousable to name, confused, moving all 4 extremities freely in bed       LABS, IMAGING, & OTHER STUDIES:  Lab Results:  I have personally reviewed pertinent labs. Results from last 7 days   Lab Units 08/23/23  0414 08/22/23  0516 08/21/23  0546   WBC Thousand/uL 8.85 8.06 7.42   HEMOGLOBIN g/dL 10.1* 11.6* 11.8*   PLATELETS Thousands/uL 219 230 195     Results from last 7 days   Lab Units 08/23/23  0414 08/22/23  0516 08/21/23  0546 08/20/23  0531 08/19/23  1053   SODIUM mmol/L 133* 133* 134*   < > 134*   POTASSIUM mmol/L 4.3 4.6 4.1   < > 4.1   CHLORIDE mmol/L 94* 93* 94*   < > 94*   CO2 mmol/L 28 27 28   < > 32   BUN mg/dL 62* 64* 49*   < > 38*   CREATININE mg/dL 9.26* 10.01* 7.50*   < > 7.48*   EGFR ml/min/1.73sq m 5 4 6   < > 6   CALCIUM mg/dL 8.4 8.9 8.8   < > 10.2   AST U/L  --   --   --   --  35   ALT U/L  --   --   --   --  14   ALK PHOS U/L  --   --   --   --  69    < > = values in this interval not displayed. Results from last 7 days   Lab Units 08/21/23  1756 08/21/23  1627 08/21/23  1423 08/19/23  1759   BLOOD CULTURE   --  Gram Negative Maximilian* No Growth at 24 hrs.  --    GRAM STAIN RESULT  1+ Polys*  1+ Gram positive cocci in pairs*  1+ Gram negative rods* Gram negative rods*  Gram positive cocci in chains*  --   --    WOUND CULTURE  Culture results to follow.   --   --   --    MRSA CULTURE ONLY   --   --   --  No Methicillin Resistant Staphlyococcus aureus (MRSA) isolated     Results from last 7 days   Lab Units 08/22/23  1053   PROCALCITONIN ng/ml 112.62*     Results from last 7 days   Lab Units 08/22/23  1053   CRP mg/L 211.1*     Results from last 7 days   Lab Units 08/22/23  1053   FERRITIN ng/mL 2,584*           Imaging Studies:   Imaging study reports and images in PACS reviewed personally. 8/22/23 MRI brain: Limited due to motion. No evidence of acute infarct. Patient refused foot x-rays, portable chest x-ray that were ordered on 8/21/2023 8/20/2023 CTA head and neck: No acute intercranial abnormality. Other Studies:   I have personally reviewed pertinent reports.

## 2023-08-23 NOTE — SEDATION DOCUMENTATION
Tunneled Perma cath removed from R chest by Dr. Hardy Polanco. Pt tolerated without issue. Steri-strip, telfa, and tegaderm to site. Education provided. Transported back to pt room.

## 2023-08-23 NOTE — OCCUPATIONAL THERAPY NOTE
Occupational Therapy Cancellation     Patient Name: Jody Shelley  Today's Date: 8/23/2023  Problem List  Principal Problem:    Altered mental status  Active Problems:    ESRD (end stage renal disease) ([address])    Type 2 diabetes mellitus, without long-term current use of insulin ([address])    Hypertension    Status post fall    Low back pain    Diabetic ulcer of left midfoot associated with type 2 diabetes mellitus, limited to breakdown of skin (HCC)    Diabetes mellitus type 2 with peripheral artery disease ([address])    Subacute osteomyelitis of right foot ([address])    Past Medical History  Past Medical History:   Diagnosis Date    CKD     Diabetes mellitus ([address])     Hypertension     Left toe amputee ([address])     TIA (transient ischemic attack)      Past Surgical History  Past Surgical History:   Procedure Laterality Date    IR TUNNELED DIALYSIS CATHETER PLACEMENT  1/27/2022 08/23/23 1006   Note Type   Note type Cancelled Session  (Jomar Todd 8/23/23)   Cancel Reasons Patient off floor/hemodialysis  (HD at bedside and RN reports going for MRI, dopplers following)   Additional Comments OT orders received and chart review completed. Spoke w/ RN, Rush Solomon.  Will cancel and continue to follow as appropriate / schedule allows to complete eval.     Marylu Mcqueen, OTR/L  [id number]  [id number]

## 2023-08-23 NOTE — ASSESSMENT & PLAN NOTE
Lab Results   Component Value Date    HGBA1C 6.6 (H) 07/07/2022       Recent Labs     08/22/23  2121 08/23/23  0745 08/23/23  1110 08/23/23  1624   POCGLU 186* 241* 194* 183*       Blood Sugar Average: Last 72 hrs:  (P) 110.0622701786882188     Podiatry consulted for [place] grade 1 ulcer submetatarsal 1 left foot. S/P Debridement  Noted to have elevated CRP, procalcitonin,  1 of 2 blood cultures growing G- rods and G+ cocci in chains  8/22 Started vanc cefepime and flagyl  8/23 ID switched abx to cefepime and flagyl, will continue  F/U final blood and wound cultures  MRI foot First submetatarsal head plantar skin ulceration with suspected draining sinus tract extending to the first metatarsal phalangeal joint suspicious for septic arthropathy  8/24 s/p I&D by podiatry  Vascular consulted as imaging showing lower limb diffuse atherosclerotic disease noted throughout with elevated PSV of proximal popliteal artery and tibial peroneal trunk suggesting less than 50% stenosis, metatarsal pressure greater than 200, great toe pressure 95 mmHg within the healing range on right side. Left lower limb showed diffuse atherosclerotic disease noted throughout with a 50 to 75% stenosis of the proximal popliteal artery, metatarsal pressure 170, great toe pressure 98 mmHg within healing range.   Follow-up on recommendations

## 2023-08-23 NOTE — CONSULTS
e-Consult ([facility])  - Interventional Radiology  Mac Al 58 y.o. male MRN: [id number]  Unit/Bed#: [address] [facility] [address]01 Encounter: [id number]    Interventional Radiology has been consulted to evaluate Mac Al    We were consulted by Dr. Inge Brown concerning this patient with sepsis. Inpatient Consult to IR  Consult performed by: Issa Montelongo MD  Consult ordered by: Zo Carolina PA-C        08/23/23    Assessment/Recommendation:   Patient with bacteremia and a foot ulcer as well as a tunneled dialysis catheter placed in January 2023. IR to remove catheter today and place a non-tunneled catheter tomorrow or Friday morning, schedule permitting since patient had dialysis today. 21-30 minutes, >50% of the total time devoted to medical consultative verbal/EMR discussion between providers. Written report will be generated in the EMR. Thank you for allowing Interventional Radiology to participate in the care of Mac Al. Please don't hesitate to call or TigerText us with any questions.      Issa Montelongo MD

## 2023-08-23 NOTE — PLAN OF CARE
Problem: Potential for Falls  Goal: Patient will remain free of falls  Description: INTERVENTIONS:  - Educate patient/family on patient safety including physical limitations  - Instruct patient to call for assistance with activity   - Consult OT/PT to assist with strengthening/mobility   - Keep Call bell within reach  - Keep bed low and locked with side rails adjusted as appropriate  - Keep care items and personal belongings within reach  - Initiate and maintain comfort rounds  - Make Fall Risk Sign visible to staff  - Offer Toileting every 2 Hours, in advance of need  - Initiate/Maintain bed alarm  - Obtain necessary fall risk management equipment: yes  - Apply yellow socks and bracelet for high fall risk patients  - Consider moving patient to room near nurses station  Outcome: Progressing     Problem: MOBILITY - ADULT  Goal: Maintain or return to baseline ADL function  Description: INTERVENTIONS:  -  Assess patient's ability to carry out ADLs; assess patient's baseline for ADL function and identify physical deficits which impact ability to perform ADLs (bathing, care of mouth/teeth, toileting, grooming, dressing, etc.)  - Assess/evaluate cause of self-care deficits   - Assess range of motion  - Assess patient's mobility; develop plan if impaired  - Assess patient's need for assistive devices and provide as appropriate  - Encourage maximum independence but intervene and supervise when necessary  - Involve family in performance of ADLs  - Assess for home care needs following discharge   - Consider OT consult to assist with ADL evaluation and planning for discharge  - Provide patient education as appropriate  Outcome: Progressing  Goal: Maintains/Returns to pre admission functional level  Description: INTERVENTIONS:  - Perform BMAT or MOVE assessment daily.   - Set and communicate daily mobility goal to care team and patient/family/caregiver.    - Collaborate with rehabilitation services on mobility goals if consulted  - Perform Range of Motion 3 times a day. - Reposition patient every 2 hours.   - Dangle patient 3 times a day  - Stand patient 3 times a day  - Ambulate patient 3 times a day  - Out of bed to chair 3 times a day   - Out of bed for meals 3 times a day  - Out of bed for toileting  - Record patient progress and toleration of activity level   Outcome: Progressing     Problem: PAIN - ADULT  Goal: Verbalizes/displays adequate comfort level or baseline comfort level  Description: Interventions:  - Encourage patient to monitor pain and request assistance  - Assess pain using appropriate pain scale  - Administer analgesics based on type and severity of pain and evaluate response  - Implement non-pharmacological measures as appropriate and evaluate response  - Consider cultural and social influences on pain and pain management  - Notify physician/advanced practitioner if interventions unsuccessful or patient reports new pain  Outcome: Progressing     Problem: INFECTION - ADULT  Goal: Absence or prevention of progression during hospitalization  Description: INTERVENTIONS:  - Assess and monitor for signs and symptoms of infection  - Monitor lab/diagnostic results  - Monitor all insertion sites, i.e. indwelling lines, tubes, and drains  - Monitor endotracheal if appropriate and nasal secretions for changes in amount and color  - [place] appropriate cooling/warming therapies per order  - Administer medications as ordered  - Instruct and encourage patient and family to use good hand hygiene technique  - Identify and instruct in appropriate isolation precautions for identified infection/condition  Outcome: Progressing  Goal: Absence of fever/infection during neutropenic period  Description: INTERVENTIONS:  - Monitor WBC    Outcome: Progressing     Problem: SAFETY ADULT  Goal: Patient will remain free of falls  Description: INTERVENTIONS:  - Educate patient/family on patient safety including physical limitations  - Instruct patient to call for assistance with activity   - Consult OT/PT to assist with strengthening/mobility   - Keep Call bell within reach  - Keep bed low and locked with side rails adjusted as appropriate  - Keep care items and personal belongings within reach  - Initiate and maintain comfort rounds  - Make Fall Risk Sign visible to staff  - Offer Toileting every 2 Hours, in advance of need  - Initiate/Maintain bed alarm  - Obtain necessary fall risk management equipment: yes  - Apply yellow socks and bracelet for high fall risk patients  - Consider moving patient to room near nurses station  Outcome: Progressing  Goal: Maintain or return to baseline ADL function  Description: INTERVENTIONS:  -  Assess patient's ability to carry out ADLs; assess patient's baseline for ADL function and identify physical deficits which impact ability to perform ADLs (bathing, care of mouth/teeth, toileting, grooming, dressing, etc.)  - Assess/evaluate cause of self-care deficits   - Assess range of motion  - Assess patient's mobility; develop plan if impaired  - Assess patient's need for assistive devices and provide as appropriate  - Encourage maximum independence but intervene and supervise when necessary  - Involve family in performance of ADLs  - Assess for home care needs following discharge   - Consider OT consult to assist with ADL evaluation and planning for discharge  - Provide patient education as appropriate  Outcome: Progressing  Goal: Maintains/Returns to pre admission functional level  Description: INTERVENTIONS:  - Perform BMAT or MOVE assessment daily.   - Set and communicate daily mobility goal to care team and patient/family/caregiver. - Collaborate with rehabilitation services on mobility goals if consulted  - Perform Range of Motion 3 times a day. - Reposition patient every 2 hours.   - Dangle patient 3 times a day  - Stand patient 3 times a day  - Ambulate patient 3 times a day  - Out of bed to chair 3 times a day   - Out of bed for meals 3 times a day  - Out of bed for toileting  - Record patient progress and toleration of activity level   Outcome: Progressing     Problem: DISCHARGE PLANNING  Goal: Discharge to home or other facility with appropriate resources  Description: INTERVENTIONS:  - Identify barriers to discharge w/patient and caregiver  - Arrange for needed discharge resources and transportation as appropriate  - Identify discharge learning needs (meds, wound care, etc.)  - Arrange for interpretive services to assist at discharge as needed  - Refer to Case Management Department for coordinating discharge planning if the patient needs post-hospital services based on physician/advanced practitioner order or complex needs related to functional status, cognitive ability, or social support system  Outcome: Progressing     Problem: Knowledge Deficit  Goal: Patient/family/caregiver demonstrates understanding of disease process, treatment plan, medications, and discharge instructions  Description: Complete learning assessment and assess knowledge base.   Interventions:  - Provide teaching at level of understanding  - Provide teaching via preferred learning methods  Outcome: Progressing     Problem: METABOLIC, FLUID AND ELECTROLYTES - ADULT  Goal: Electrolytes maintained within normal limits  Description: INTERVENTIONS:  - Monitor labs and assess patient for signs and symptoms of electrolyte imbalances  - Administer electrolyte replacement as ordered  - Monitor response to electrolyte replacements, including repeat lab results as appropriate  - Instruct patient on fluid and nutrition as appropriate  Outcome: Progressing  Goal: Fluid balance maintained  Description: INTERVENTIONS:  - Monitor labs   - Monitor I/O and WT  - Instruct patient on fluid and nutrition as appropriate  - Assess for signs & symptoms of volume excess or deficit  Outcome: Progressing     Problem: Nutrition/Hydration-ADULT  Goal: Nutrient/Hydration intake appropriate for improving, restoring or maintaining nutritional needs  Description: Monitor and assess patient's nutrition/hydration status for malnutrition. Collaborate with interdisciplinary team and initiate plan and interventions as ordered. Monitor patient's weight and dietary intake as ordered or per policy. Utilize nutrition screening tool and intervene as necessary. Determine patient's food preferences and provide high-protein, high-caloric foods as appropriate.      INTERVENTIONS:  - Monitor oral intake, urinary output, labs, and treatment plans  - Assess nutrition and hydration status and recommend course of action  - Evaluate amount of meals eaten  - Assist patient with eating if necessary   - Allow adequate time for meals  - Recommend/ encourage appropriate diets, oral nutritional supplements, and vitamin/mineral supplements  - Order, calculate, and assess calorie counts as needed  - Recommend, monitor, and adjust tube feedings and TPN/PPN based on assessed needs  - Assess need for intravenous fluids  - Provide specific nutrition/hydration education as appropriate  - Include patient/family/caregiver in decisions related to nutrition  Outcome: Progressing     Problem: SAFETY,RESTRAINT: NV/NON-SELF DESTRUCTIVE BEHAVIOR  Goal: Remains free of harm/injury (restraint for non violent/non self-detsructive behavior)  Description: INTERVENTIONS:  - Instruct patient/family regarding restraint use   - Assess and monitor physiologic and psychological status   - Provide interventions and comfort measures to meet assessed patient needs   - Identify and implement measures to help patient regain control  - Assess readiness for release of restraint   Outcome: Progressing  Goal: Returns to optimal restraint-free functioning  Description: INTERVENTIONS:  - Assess the patient's behavior and symptoms that indicate continued need for restraint  - Identify and implement measures to help patient regain control  - Assess readiness for release of restraint   Outcome: Progressing     Problem: Prexisting or High Potential for Compromised Skin Integrity  Goal: Skin integrity is maintained or improved  Description: INTERVENTIONS:  - Identify patients at risk for skin breakdown  - Assess and monitor skin integrity  - Assess and monitor nutrition and hydration status  - Monitor labs   - Assess for incontinence   - Turn and reposition patient  - Assist with mobility/ambulation  - Relieve pressure over bony prominences  - Avoid friction and shearing  - Provide appropriate hygiene as needed including keeping skin clean and dry  - Evaluate need for skin moisturizer/barrier cream  - Collaborate with interdisciplinary team   - Patient/family teaching  - Consider wound care consult   Outcome: Progressing

## 2023-08-23 NOTE — CASE MANAGEMENT
Case Management Assessment & Discharge Planning Note    Patient name Sergey Valdez  Location [address]-* MRN [id number]  : 1960 Date 2023       Current Admission Date: 2023  Current Admission Diagnosis:Altered mental status   Patient Active Problem List    Diagnosis Date Noted   • Subacute osteomyelitis of right foot ([address])    • Open wound of right foot 2023   • 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 mellitus, with bone involvement without evidence of necrosis ([address])    • Diabetic ulcer of left midfoot associated with type 2 diabetes mellitus, limited to breakdown of skin ([address])    • Diabetic polyneuropathy associated with type 2 diabetes mellitus ([address])    • Diabetes mellitus type 2 with peripheral artery disease ([address])    • History of amputation of lesser toe of left foot ([address])    • Onychomycosis    • Altered mental status 2023   • Status post fall 2023   • Low back pain 2023   • Traumatic rhabdomyolysis ([address]) 2023   • Leukocytosis 2023   • Electrolyte abnormality 2023   • Osteoarthritis of left hip 2022   • Severe Left Orchalgia 2022   • Right shoulder pain 2022   • Left hip pain 2022   • Hemodialysis status ([address])    • Hypervolemia    • Anemia 2022   • ESRD (end stage renal disease) ([address]) 2022   • Type 2 diabetes mellitus, without long-term current use of insulin ([address])    • Hypertension    • History of TIA (transient ischemic attack)    • T10 vertebral fracture (HCC)       LOS (days): 4  Geometric Mean LOS (GMLOS) (days): 4.70  Days to GMLOS:0.7     OBJECTIVE:    Risk of Unplanned Readmission Score: 23.98      Current admission status: Inpatient     Preferred Pharmacy:   [facility] [address] [address]  Phone: [phone] Fax: [phone]    Primary Care Provider: Spencer Torres MD    Primary Insurance: MEDICARE  Secondary Insurance:     ASSESSMENT:  Phong Proxies    There are no active Health Care Proxies on file. Readmission Root Cause  30 Day Readmission: No    Patient Information  Admitted from[de-identified] Home  Mental Status: Alert  During Assessment patient was accompanied by: Not accompanied during assessment  Assessment information provided by[de-identified] Patient  Primary Caregiver: Self  Support Systems: Daughter, Family members (Ex-spouse; Sister)  [place] of Residence: [address] do you live in?: [place], [address] [place] [address] Street entry access options. Select all that apply.: Stairs  Number of steps to enter home.: 7  Type of Current Residence: Apartment  Floor Level: 1  Upon entering residence, is there a bedroom on the main floor (no further steps)?: Yes  Upon entering residence, is there a bathroom on the main floor (no further steps)?: Yes  In the last 12 months, was there a time when you were not able to pay the mortgage or rent on time?: No  In the last 12 months, how many places have you lived?: 2  In the last 12 months, was there a time when you did not have a steady place to sleep or slept in a shelter (including now)?: No  Homeless/housing insecurity resource given?: N/A  Living Arrangements: Lives w/ Extended Family (Pt currently lives with sister Darwin Panda in [facility] address on file is mailing address only)    Activities of Daily Living Prior to Admission  Functional Status: Independent  Completes ADLs independently?: Yes  Ambulates independently?: Yes  Does patient use assisted devices?: No  Does patient currently own DME?: Yes  What DME does the patient currently own?: Manny Martin  Does patient have a history of Outpatient Therapy (PT/OT)?: No  Does the patient have a history of Short-Term Rehab?: No  Does patient have a history of HHC?: No  Does patient currently have [address]?: No     Patient Information Continued  Income Source: SSI/SSD  Does patient have prescription coverage?: Yes  Within the past 12 months, you worried that your food would run out before you got the money to buy more.: Never true  Within the past 12 months, the food you bought just didn't last and you didn't have money to get more.: Never true  Food insecurity resource given?: N/A  Does patient receive dialysis treatments?: Yes ([address] T/[address]S [address]; Drives self)     Means of Transportation  Means of Transport to Appts[de-identified] Drives Self  In the past 12 months, has lack of transportation kept you from medical appointments or from getting medications?: No  In the past 12 months, has lack of transportation kept you from meetings, work, or from getting things needed for daily living?: No  Was application for public transport provided?: N/A    DISCHARGE DETAILS:    Discharge planning discussed with[de-identified] Josse Darling (ex-spouse)  Freedom of Choice: Yes  Comments - Freedom of Choice: SW spoke with ex-wife Yariel Cruz to introduce role, complete assessment, and discuss discharge plan. Yariel Cruz notes that prior to admission patient was completely independent with mobility and ADLS including driving. Patient is looking to move to the [facility] (where he is established with OP HD) but has not been able to find affordable housing; hence, patient continues to live with his sister Yane Lerma in [place]. Yariel Cruz aware discharge plan remains pending at this time pending clincial course and improvement in mentation.   CM contacted family/caregiver?: Yes  Were Treatment Team discharge recommendations reviewed with patient/caregiver?: Yes  Did patient/caregiver verbalize understanding of patient care needs?: Yes  Were patient/caregiver advised of the risks associated with not following Treatment Team discharge recommendations?: Yes    Contacts  Patient Contacts: Josse Darling (ex-wife)  Relationship to Patient[de-identified] Other (Comment)  Contact Method: Phone  Phone Number: [phone]  Reason/Outcome: Emergency Contact, Continuity of Care, Discharge Planning    Discharge Destination Plan[de-identified] Other (TBD pending progress)

## 2023-08-23 NOTE — ASSESSMENT & PLAN NOTE
Patient sustained a fall about 2 days ago  Etiology not clear-EKG was unremarkable. Blood sugar was 157. Questionable hypoglycemia versus accidental fall. Patient not exhibiting any signs of stroke. Telemetry showed no evidence of arrhythmia  Patient does not remember how he fell. Patient was found on the floor and could not get up. We will get PT/OT evaluation and treatment  CAT scan of the brain, CT chest abdomen pelvis, cervical spine and lumbar reconstructions study was ordered in the ED but patient refused the studies  Patient got CT of the head and neck after receiving Ativan which was unremarkable.   Fall precautions

## 2023-08-23 NOTE — PROGRESS NOTES
NEPHROLOGY PROGRESS NOTE   Jody Shelley 58 y.o. male MRN: [id number]  Unit/Bed#: [address] [place] [address] Encounter: [id number]  Reason for Consult: Management of ESRD    ASSESSMENT AND PLAN:  59 yo man with PMH of ESRD on HD TTS p/w AMS.   Nephrology is consulted for management of ESRD                PLAN:     #ESRD on HD TTS:  • Dialysis unit/days: [facility]  • Access: Right IJ PermCath  • Extra HD this morning  • Fluid restriction 1-1.5L/d  • Adjust medications to GFR<10  • Avoid opioids   • Discontinue magnesium, serum medicine 2.5. Avoid magnesium supplements from ESRD     #Volume status/hypertension:  • Volume:   Euvolemic  • Blood pressure:   Normotensive, /52mmhg   • low-sodium diet  • Coreg 12.5 twice daily  • Nifedipine 30 mg twice daily     #Secondary Hyperparasitoidism   • On sevelamer only with meals     # Anemia of Kidney Disease  • Current hemoglobin: 10 mg/dL  • At goal  • Treatment:  • Transfuse for hemoglobin less than 7.0 per primary service          #AMS  • Unclear etiology   • X-ray dialysis today  for better clearance  • Low suspicious of uremic symptoms with ongoing dialysis however we will attempt better clearing this with dialysis today and tomorrow    HEMODIALYSIS PROCEDURE NOTE  The patient was seen and examined on hemodialysis. The patient is tolerating the procedure well. Time: 3 hours  Sodium: 138 Blood flow: 350   Dialyzer: F160 Potassium: As per protocol Dialysate flow: 600   Access: PermCath Bicarbonate: 35 Ultrafiltration goal: 1 L   Medications on HD: None       SUBJECTIVE:  Patient seen and examined at bedside. Patient is awake, talking, interacting.   Request, pending before dialysis       OBJECTIVE:  Current Weight: Weight - Scale: 115 kg (253 lb 8.5 oz)  Vitals:    08/23/23 0900   BP: 132/52   Pulse: 61   Resp: 18   Temp:    SpO2: 92%       Intake/Output Summary (Last 24 hours) at 8/23/2023 0950  Last data filed at 8/23/2023 0830  Gross per 24 hour   Intake 700 ml   Output 1650 ml   Net -950 ml     Wt Readings from Last 3 Encounters:   08/19/23 115 kg (253 lb 8.5 oz)   07/31/22 102 kg (225 lb 6.4 oz)   07/06/22 106 kg (233 lb 11 oz)     Temp Readings from Last 3 Encounters:   08/23/23 97.9 °F (36.6 °C) (Oral)   07/31/22 98.4 °F (36.9 °C) (Temporal)   07/13/22 (!) 97 °F (36.1 °C) (Oral)     BP Readings from Last 3 Encounters:   08/23/23 132/52   07/31/22 118/60   07/14/22 139/52     Pulse Readings from Last 3 Encounters:   08/23/23 61   07/31/22 61   07/14/22 65        General:  no acute distress at this time  Skin:  No acute rash  Eyes:  No scleral icterus and noninjected  ENT:  mucous membranes moist  Neck:  no carotid bruits  Chest:  Clear to auscultation percussion, good respiratory effort, no use of accessory respiratory muscles  CVS:  Regular rate and rhythm without rub   Abdomen:  soft and nontender   Extremities: no significant lower extremity edema  Neuro:  No gross focality  Psych:  Alert , cooperative  Vascular access: PermCath    Medications:    Current Facility-Administered Medications:   •  acetaminophen (TYLENOL) tablet 650 mg, 650 mg, Oral, Q6H PRN, Coco Mak MD, 650 mg at 08/20/23 1141  •  acetaminophen (TYLENOL) tablet 975 mg, 975 mg, Oral, Q8H, Joel Dalton MD, 975 mg at 08/23/23 0219  •  atorvastatin (LIPITOR) tablet 80 mg, 80 mg, Oral, Daily, Joel Dalton MD, 80 mg at 08/22/23 9701  •  carvedilol (COREG) tablet 12.5 mg, 12.5 mg, Oral, BID, Joel Dalton MD, 12.5 mg at 08/22/23 6524  •  cefepime (MAXIPIME) IVPB (premix in dextrose) 1,000 mg 50 mL, 1,000 mg, Intravenous, Q24H, Jaren Méndez DO, Last Rate: 100 mL/hr at 08/22/23 1912, 1,000 mg at 08/22/23 1912  •  famotidine (PEPCID) tablet 10 mg, 10 mg, Oral, Daily, Joel Dalton MD, 10 mg at 08/22/23 5914  •  gabapentin (NEURONTIN) oral solution 200 mg, 200 mg, Oral, BID, Jaren Méndez DO  •  heparin (porcine) subcutaneous injection 5,000 Units, 5,000 Units, Subcutaneous, Q8H [address], Joel MD Sha, 5,000 Units at 08/23/23 0515  •  HYDROmorphone (DILAUDID) injection 1 mg, 1 mg, Intravenous, Q4H PRN, Jose Parson MD, 1 mg at 08/19/23 2054  •  insulin lispro (HumaLOG) 100 units/mL subcutaneous injection 1-5 Units, 1-5 Units, Subcutaneous, HS, Jose Parson MD, 1 Units at 08/22/23 2124  •  insulin lispro (HumaLOG) 100 units/mL subcutaneous injection 2-12 Units, 2-12 Units, Subcutaneous, TID AC, 2 Units at 08/22/23 0756 **AND** Fingerstick Glucose (POCT), , , TID AC, Jose Parson MD  •  lidocaine (LIDODERM) 5 % patch 1 patch, 1 patch, Topical, Daily, Jose Parson MD, 1 patch at 08/22/23 7041  •  LORazepam (ATIVAN) injection 0.5 mg, 0.5 mg, Intravenous, Q6H PRN, Jose Parson MD, 0.5 mg at 08/22/23 1240  •  methocarbamol (ROBAXIN) tablet 500 mg, 500 mg, Oral, Q12H PRN, Jose Parson MD, 500 mg at 08/19/23 1423  •  metroNIDAZOLE (FLAGYL) tablet 500 mg, 500 mg, Oral, Q8H [facility], 500 mg at 08/23/23 0220  •  NIFEdipine (PROCARDIA XL) 24 hr tablet 30 mg, 30 mg, Oral, BID, Jose Parson MD, 30 mg at 08/22/23 1814  •  ondansetron (ZOFRAN) injection 4 mg, 4 mg, Intravenous, Q6H PRN, Joes Parson MD  •  oxyCODONE (ROXICODONE) IR tablet 5 mg, 5 mg, Oral, Q8H PRN, Jose Parson MD, 5 mg at 08/23/23 0839  •  senna-docusate sodium (SENOKOT S) 8.6-50 mg per tablet 2 tablet, 2 tablet, Oral, BID, [facility] , 2 tablet at 08/22/23 1824  •  sevelamer (RENAGEL) tablet 800 mg, 800 mg, Oral, TID With Meals, Jose Parson MD, 800 mg at 08/22/23 1814  •  vancomycin (VANCOCIN) IVPB (premix in dextrose) 1,000 mg 200 mL, 10 mg/kg (Adjusted), Intravenous, Once PRN, Jaren Méndez DO    Laboratory Results:  Results from last 7 days   Lab Units 08/23/23  0414 08/22/23  0516 08/21/23  0546 08/20/23  0531 08/19/23  1053   WBC Thousand/uL 8.85 8.06 7.42 11.35* 14.01*   HEMOGLOBIN g/dL 10.1* 11.6* 11.8* 11.7* 11.4*   HEMATOCRIT % 31.8* 36.2* 37.0 37.3 35.5* PLATELETS Thousands/uL 219 230 195 211 213   SODIUM mmol/L 133* 133* 134* 134* 134*   POTASSIUM mmol/L 4.3 4.6 4.1 3.9 4.1   CHLORIDE mmol/L 94* 93* 94* 94* 94*   CO2 mmol/L 28 27 28 31 32   BUN mg/dL 62* 64* 49* 27* 38*   CREATININE mg/dL 9.26* 10.01* 7.50* 5.15* 7.48*   CALCIUM mg/dL 8.4 8.9 8.8 9.3 10.2   MAGNESIUM mg/dL 2.6  --   --  2.0 1.8*   PHOSPHORUS mg/dL 2.5  --   --   --  1.8*       MRI brain wo contrast   Final Result by Benton Storey MD (08/22 1338)      Limited, motion degraded examination. There is no evidence for an acute territorial infarct. Workstation performed: [id number]         CTA head and neck w wo contrast   Final Result by Windy Rogers MD (08/20 2054)      No acute intracranial abnormality. Tiny hypodensity right posterior inferior putamen, likely chronic lacunar infarct or dilated perivascular space. Negative CTA head and neck for large vessel occlusion, dissection, aneurysm, or high-grade stenosis. Possible tracheobronchomalacia. Additional chronic/incidental findings as detailed above. The study was marked in [facility] for immediate notification. Workstation performed: [id number]         VAS lower limb arterial duplex, complete bilateral    (Results Pending)   XR foot 2 vw left    (Results Pending)   XR foot 3+ vw right    (Results Pending)   XR chest portable    (Results Pending)   MRI foot/forefoot toes right wo contrast    (Results Pending)       Portions of the record may have been created with voice recognition software. Occasional wrong word or "sound a like" substitutions may have occurred due to the inherent limitations of voice recognition software. Read the chart carefully and recognize, using context, where substitutions have occurred.

## 2023-08-23 NOTE — ASSESSMENT & PLAN NOTE
Lab Results   Component Value Date    HGBA1C 6.6 (H) 07/07/2022       Recent Labs     08/22/23  2121 08/23/23  0745 08/23/23  1110 08/23/23  1624   POCGLU 186* 241* 194* 183*       Blood Sugar Average: Last 72 hrs:  (P) [id number]   Patient is on linagliptin at home. Continue Humalog sliding scale with Accu-Cheks before every meal and at bedtime.   Patient has been a diabetic diet  lantus 15 lispro 5 units

## 2023-08-23 NOTE — ASSESSMENT & PLAN NOTE
Lab Results   Component Value Date    EGFR 5 08/23/2023    EGFR 4 08/22/2023    EGFR 6 08/21/2023    CREATININE 9.26 (H) 08/23/2023    CREATININE 10.01 (H) 08/22/2023    CREATININE 7.50 (H) 08/21/2023   Patient is on Tuesday Thursday Saturday schedule at [facility]    Temporary HD cath removed IR, temporary  catheter placed 8/25  Neurology following, plan for dialysis in a.m.

## 2023-08-23 NOTE — PROGRESS NOTES
[address]  Progress Note  Name: Amber Arreaga  MRN: [id number]  Unit/Bed#: 913 [facility] 421-01 I Date of Admission: 8/19/2023   Date of Service: 8/23/2023 I Hospital Day: 4    Assessment/Plan   * Altered mental status  Assessment & Plan  Patient has been confused and more agitated and not following commands and refusing all treatment today. Pending urine drug screen  Neurology consult pending at the current time-based on further course might need to consider MRI of the brain 2. Patient has known history of TIAs  CT of the head and neck with no acute intracranial abnormality with tiny hypodensity in the right posterior inferior putamen likely chronic lacunar infarct dilated prevascular space. Negative CTA for large vessel occlusion dissection or aneurysm with possible tracheobronchomalacia  Discussed with patient's friend-no history of alcohol use or drug use  Ammonia and TSH level were normal  Patient continues to remain intermittently agitated, confused, not following commands  Psychiatry was consulted-recommending Haldol 2 mg p.o. every 4 hours as needed for agitation at 2 mg IM as needed for severe agitation. Started septic work-up also as patient noted to have deep left foot wound-patient has been afebrile and had white count initially which has since normalized. Suspect most symptoms 2/2 infection see below  B12, folate, TSH, ammonia WNL  Mentation appears to be improved    Diabetic ulcer of left midfoot associated with type 2 diabetes mellitus, limited to breakdown of skin [facility])  Assessment & Plan  Lab Results   Component Value Date    HGBA1C 6.6 (H) 07/07/2022       Recent Labs     08/22/23  2121 08/23/23  0745 08/23/23  1110 08/23/23  1624   POCGLU 186* 241* 194* 183*       Blood Sugar Average: Last 72 hrs:  (P) 264.5570134781740848     Podiatry consulted for Delgadillo grade 1 ulcer submetatarsal 1 left foot.    S/P Debridement  Noted to have elevated CRP, procalcitonin,  1 of 2 blood cultures growing G- rods and G+ cocci in chains  8/22 Started vanc cefepime and flagyl  F/U Final blood culture and wound cultures  MRI foot pending  ID consulted     Low back pain  Assessment & Plan  Patient has history of compression fracture of the T10  Currently complaining of severe lower back pain but refusing CAT scan of the lumbar spine or any imaging  Pain control with Lidoderm, Tylenol, oxycodone and Dilaudid for breakthrough pain  PT/OT evaluation and treatment    Status post fall  Assessment & Plan  Patient sustained a fall about 2 days ago  Etiology not clear-EKG was unremarkable. Blood sugar was 157. Questionable hypoglycemia versus accidental fall. Patient not exhibiting any signs of stroke. Telemetry showed no evidence of arrhythmia  Patient does not remember how he fell. Patient was found on the floor and could not get up. We will get PT/OT evaluation and treatment  CAT scan of the brain, CT chest abdomen pelvis, cervical spine and lumbar reconstructions study was ordered in the ED but patient refused the studies  Patient got CT of the head and neck after receiving Ativan which was unremarkable. Hypertension  Assessment & Plan  Continue Procardia 30 mg p.o. twice daily, Coreg 12.5 mg p.o. twice daily    Type 2 diabetes mellitus, without long-term current use of insulin [facility])  Assessment & Plan  Lab Results   Component Value Date    HGBA1C 6.6 (H) 07/07/2022       Recent Labs     08/22/23  2121 08/23/23  0745 08/23/23  1110 08/23/23  1624   POCGLU 186* 241* 194* 183*       Blood Sugar Average: Last 72 hrs:  (P) 200.7688522352486018   Patient is on linagliptin at home. Continue Humalog sliding scale with Accu-Cheks before every meal and at bedtime.   Patient has been a diabetic diet    ESRD (end stage renal disease) [facility])  Assessment & Plan  Lab Results   Component Value Date    EGFR 5 08/23/2023    EGFR 4 08/22/2023    EGFR 6 08/21/2023    CREATININE 9.26 (H) 08/23/2023    CREATININE 10.01 (H) 2023    CREATININE 7.50 (H) 2023   Patient is on  schedule at [address]    S/P HD today cut short for MRI  Plan for HD tomorrow             VTE Pharmacologic Prophylaxis: VTE Score: 2 Moderate Risk (Score 3-4) - Pharmacological DVT Prophylaxis Ordered: heparin. Patient Centered Rounds: I performed bedside rounds with nursing staff today. Discussions with Specialists or Other Care Team Provider: nephro    Education and Discussions with Family / Patient: Updated  (friend) via phone. Total Time Spent on Date of Encounter in care of patient: 45 minutes This time was spent on one or more of the following: performing physical exam; counseling and coordination of care; obtaining or reviewing history; documenting in the medical record; reviewing/ordering tests, medications or procedures; communicating with other healthcare professionals and discussing with patient's family/caregivers. Current Length of Stay: 4 day(s)  Current Patient Status: Inpatient   Certification Statement: The patient will continue to require additional inpatient hospital stay due to altered mental status  Discharge Plan: Anticipate discharge in 48-72 hrs to discharge location to be determined pending rehab evaluations. Code Status: Level 1 - Full Code    Subjective:   Patient seen examined at bedside. Appears to be much less confused and agitated. Became more agitated in afternoon after HD. Unable to tolerate MRI foot with Valium 10 mg IV prior. Will attempt again after haldol 2 mg iv. Objective:     Vitals:   Temp (24hrs), Av.6 °F (37 °C), Min:97.9 °F (36.6 °C), Max:100.1 °F (37.8 °C)    Temp:  [97.9 °F (36.6 °C)-100.1 °F (37.8 °C)] 100.1 °F (37.8 °C)  HR:  [60-73] 68  Resp:  [18-20] 20  BP: (120-175)/(44-87) 138/49  SpO2:  [88 %-93 %] 91 %  Body mass index is 33.45 kg/m². Input and Output Summary (last 24 hours):      Intake/Output Summary (Last 24 hours) at 2023 [address] [place] [address] filed at 8/23/2023 1133  Gross per 24 hour   Intake 500 ml   Output 1100 ml   Net -600 ml       Physical Exam:   Physical Exam  Vitals and nursing note reviewed. Constitutional:       General: He is not in acute distress. Appearance: He is well-developed. He is obese. He is ill-appearing. HENT:      Head: Normocephalic and atraumatic. Eyes:      Conjunctiva/sclera: Conjunctivae normal.   Cardiovascular:      Rate and Rhythm: Normal rate and regular rhythm. Heart sounds: No murmur heard. Pulmonary:      Effort: Pulmonary effort is normal. No respiratory distress. Breath sounds: Normal breath sounds. Abdominal:      Palpations: Abdomen is soft. Tenderness: There is no abdominal tenderness. Musculoskeletal:         General: No swelling. Cervical back: Neck supple. Comments: Temporary hd cath right subclavian    Skin:     General: Skin is warm and dry. Capillary Refill: Capillary refill takes less than 2 seconds. Neurological:      Mental Status: He is alert. He is disoriented.    Psychiatric:         Mood and Affect: Mood normal.          Additional Data:     Labs:  Results from last 7 days   Lab Units 08/23/23  0414 08/22/23  0516   WBC Thousand/uL 8.85 8.06   HEMOGLOBIN g/dL 10.1* 11.6*   HEMATOCRIT % 31.8* 36.2*   PLATELETS Thousands/uL 219 230   NEUTROS PCT %  --  74   LYMPHS PCT %  --  12*   MONOS PCT %  --  10   EOS PCT %  --  2     Results from last 7 days   Lab Units 08/23/23  0414 08/20/23  0531 08/19/23  1053   SODIUM mmol/L 133*   < > 134*   POTASSIUM mmol/L 4.3   < > 4.1   CHLORIDE mmol/L 94*   < > 94*   CO2 mmol/L 28   < > 32   BUN mg/dL 62*   < > 38*   CREATININE mg/dL 9.26*   < > 7.48*   ANION GAP mmol/L 11   < > 8   CALCIUM mg/dL 8.4   < > 10.2   ALBUMIN g/dL 3.3*  --  3.7   TOTAL BILIRUBIN mg/dL  --   --  0.68   ALK PHOS U/L  --   --  69   ALT U/L  --   --  14   AST U/L  --   --  35   GLUCOSE RANDOM mg/dL 166*   < > 136    < > = values in this interval not displayed. Results from last 7 days   Lab Units 08/19/23  1053   INR  1.16     Results from last 7 days   Lab Units 08/23/23  1624 08/23/23  1110 08/23/23  0745 08/22/23  2121 08/22/23  1626 08/22/23  1129 08/22/23  0718 08/21/23  2116 08/21/23  1654 08/21/23  1131 08/21/23  0724 08/20/23  2049   POC GLUCOSE mg/dl 183* 194* 241* 186* 142* 125 172* 160* 175* 134 137 168*         Results from last 7 days   Lab Units 08/22/23  1053   PROCALCITONIN ng/ml 112.62*       Lines/Drains:  Invasive Devices     Peripheral Intravenous Line  Duration           Peripheral IV 08/23/23 Left;Ventral (anterior) Forearm <1 day                      Imaging: Reviewed radiology reports from this admission including: MRI brain    Recent Cultures (last 7 days):   Results from last 7 days   Lab Units 08/21/23  1756 08/21/23  1627 08/21/23  1423   BLOOD CULTURE   --  Enterobacter cloacae*  Granulicatella adiacens*  Proteus vulgaris group* No Growth at 48 hrs.    GRAM STAIN RESULT  1+ Polys*  1+ Gram positive cocci in pairs*  1+ Gram negative rods* Gram negative rods*  Gram positive cocci in chains*  --    WOUND CULTURE  1+ Growth of Pseudomonas aeruginosa*  2+ Growth of Enterobacter cloacae*  2+ Growth of  --   --        Last 24 Hours Medication List:   Current Facility-Administered Medications   Medication Dose Route Frequency Provider Last Rate   • acetaminophen  650 mg Oral Q6H PRN Joel Dalton MD     • acetaminophen  975 mg Oral Q8H Joel Dalton MD     • atorvastatin  80 mg Oral Daily Lisa Johnson MD     • carvedilol  12.5 mg Oral BID Lisa Johnson MD     • cefepime  1,000 mg Intravenous Q24H Jaren Méndez DO 1,000 mg (08/23/23 1619)   • famotidine  10 mg Oral Daily Joel Dalton MD     • gabapentin  200 mg Oral BID Jaren Méndez DO     • haloperidol lactate  2 mg Intramuscular Q6H PRN Jaren Méndez DO     • heparin (porcine)  5,000 Units Subcutaneous Q8H [address] Lisa Stakes, MD     • HYDROmorphone  1 mg Intravenous Q4H PRN Trang Rodriguez MD     • insulin lispro  1-5 Units Subcutaneous HS Trang Rodriguez MD     • insulin lispro  2-12 Units Subcutaneous TID AC Trang Rodriguez MD     • lidocaine  1 patch Topical Daily Trang Rodriguez MD     • LORazepam  0.5 mg Intravenous Q6H PRN Trang Rodriguez MD     • methocarbamol  500 mg Oral Q12H PRN Trang Rodriguez MD     • metroNIDAZOLE  500 mg Oral Q8H [facility] Jaren Méndez DO     • NIFEdipine  30 mg Oral BID Trang Rodriguez MD     • ondansetron  4 mg Intravenous Q6H PRN Trang Rodriguez MD     • oxyCODONE  5 mg Oral Q8H PRN Trang Rodriguez MD     • senna-docusate sodium  2 tablet Oral BID Jaren Méndez DO     • sevelamer  800 mg Oral TID With Meals Trang Rodriguez MD     • vancomycin  10 mg/kg (Adjusted) Intravenous Once PRN Jaren Méndez DO          Today, Patient Was Seen By: Azeb Cameron DO    **Please Note: This note may have been constructed using a voice recognition system. **

## 2023-08-23 NOTE — PHYSICAL THERAPY NOTE
08/23/23 1102   Note Type   Note type Cancelled Session   Cancel Reasons Patient off floor/hemodialysis   Additional Comments Per RN and OT, hold PT as patient is currently on hemodialysis and then patient going for MRI and Dopplers in the p.m. Will follow.    Licensure   [place] License Number  [id number], [place] [id number]

## 2023-08-23 NOTE — PROGRESS NOTES
Emily Bradley is a 58 y.o. male who is currently ordered Vancomycin IV with management by the Pharmacy Consult service. Relevant clinical data and objective / subjective history reviewed. Vancomycin Assessment:  1. Indication and Goal AUC/Trough: Bone/joint infection (goal -600, trough >10), -600, trough >10  2. Clinical Status: stable  3. Micro:   4. Renal Function:  · SCr: 9.26 mg/dL  · CrCl: 10.7 mL/min  · Renal replacement: HD  5. Days of Therapy: 2  6. Current Dose: 1g IV when trough < 15  Vancomycin Plan:  1. Continue with the current plan. Dose will not be given today. 2. Estimated AUC: 400-600 mcg*hr/mL  3. Estimated Trough: >10 mcg/mL  4. Next Level: 8/24/23 @0600  5. Renal Function Monitoring: Daily BMP and [place] will continue to follow closely for s/sx of nephrotoxicity, infusion reactions and appropriateness of therapy. BMP and CBC will be ordered per protocol. We will continue to follow the patient’s culture results and clinical progress daily.     Destin Bennett, Pharmacist

## 2023-08-23 NOTE — PROGRESS NOTES
Progress Note - Behavioral Health   Yadira Arredondo 58 y.o. male MRN: [id number]  Unit/Bed#: [address] [place] [address] Encounter: [id number]          I came to see the patient this morning for continuity of care. Patient was awake and alert. He was calm and cooperative with interview. He states that he has difficulty remembering what happened prior to hospitalization though states that he was "down for 2 days". He was weak and was yelling for help though states he was unable to move. States that his daughter and her boyfriend "broke down the door" and found him. He reports feeling tired and weak today. Reports that his mood has been "pretty good". He denies feeling depressed currently or recently. Denies suicidal or homicidal ideation, plan, or intent. Reports that he enjoys cooking. States that his family is important to him. He denies auditory or visual hallucinations. Does not endorse current or previous episodes of alexander/hypomania. He denies current alcohol, drug, or tobacco use. He denies following with a psychiatrist and denies taking psychiatric medication. Behavior over the last 24 hours:  improved  Medication side effects: None reported  ROS: "Tired, weak". All other systems reviewed and are negative    Mental Status Evaluation:  Appearance:  age appropriate and Laying in bed   Behavior:  Calm, cooperative   Speech:  Normal volume, normal rate   Mood:   "Pretty good"   Affect:  constricted and Brightens   Language: naming objects and repeating phrases   Thought Process:  organzied   Associations: intact associations   Thought Content:  No delusions elicited   Perceptual Disturbances: Denies auditory or visual hallucinations   Risk Potential: Suicidal Ideations none, Homicidal Ideations none and Potential for Aggression No   Sensorium:  person, month of year, year and States this hospital is "[facility]".   States "Art" is the president   Memory:  Recent memory appears impaired, remote memory appears intact   Cognition:  Recent memory appears impaired, remote memory appears intact   Consciousness:  alert and awake    Attention: attention span and concentration were age appropriate   Intellect: within normal limits   Fund of Knowledge: awareness of current events: fair, past history: moderate and vocabulary: fair   Insight:  fair   Judgment: fair   Muscle Strength and Tone: Not assessed   Gait/Station: Not assessed, patient in bed   Motor Activity: no abnormal movements         Assessment/Plan  Andres Adams is a 58 y.o. male with past medical history of end-stage renal disease, hypertension, type 2 diabetes who presented to the emergency department after being found down. Per ED documentation patient reported that he had ended up on the ground of his house and was unsure how he got there and patient stated he believes he was on the ground for approximately 2 days. Per documentation patient has end-stage renal disease with Tuesday, Thursday, Saturday dialysis and patient missed dialysis on Saturday morning. Patient was subsequently admitted and has underwent medical work-up. Psychiatry had been consulted for agitation. Recommendations were to avoid is using benzodiazepines and to recommend Haldol 2 mg q4hr as needed agitation and repeat EKG. Evaluation patient per chart has not. Received Haldol for agitation. He received 2 doses of lorazepam 0.5 mg IV. Patient appeared to be much improved today. He states he has difficulty remembering the events prior to hospitalization though states he was "down for 2 days", was weak and yelling for help. He reports his mood has been "pretty good". Denies current or recent depression. Denies suicidal or homicidal ideation, plan, or intent. Reports that he enjoys cooking. States that his family is important to him. He denies auditory or visual hallucinations. Does not endorse current or previous episodes of alexander/hypomania.   He denies current alcohol, drug, or tobacco use. He denies following with a psychiatrist and denies taking psychiatric medication. It appears that patient's delirium is likely due to infection. Delirium appears to be much improved today. Patient was oriented to person, hospital "[facility], month, year. Stated that "Franny Brooks" is the president. Patient is not interested in starting psychiatric medication. Patient does not require inpatient psychiatric hospitalization. Diagnosis:  Delirium      Recommended Treatment:   Continue medical management  Patient is not interested in starting psychiatric medications  Patient does not require inpatient psychiatric hospitalization  Patient does not appear at this time to require medications for agitation and therefore would avoid such use of medications, however if needed in the future would avoid antipsychotics as patient's recent QTc on EKG from 8/21/2023 was 488 and antipsychotics can prolong the QTc. May continue to use lorazepam as needed for anxiety, agitation  Repeat EKG for QTc monitoring  For QTc: maintain magnesium over 2 and potassium over 4 - renal function panel from this morning shows a potassium of 4.3, magnesium level this morning is at 2.6  TSH, folate, B12 within normal limits. Ammonia was 17. Discussed with the primary team  I will sign off, please contact with questions. For overnights and weekends please contact [facility] for psychiatric purposes      Medications: all current active meds have been reviewed      Risks, benefits and possible side effects of Medications:   Patient is not interested in starting psychiatric medication. No medications recommended by me at this time    Labs: I have personally reviewed all pertinent laboratory results. I have personally reviewed all pertinent laboratory/tests results.   Labs in last 72 hours:   Recent Labs     08/22/23  0516 08/22/23  1053 08/23/23  0414   WBC 8.06  --  8.85   RBC 3.93  --  3.44*   HGB 11.6*  --  10.1*   HCT 36.2*  --  31.8*     --  219   RDW 16.4*  --  16.4*   NEUTROABS 6.00  --   --    SODIUM 133*  --  133*   K 4.6  --  4.3   CL 93*  --  94*   CO2 27  --  28   BUN 64*  --  62*   CREATININE 10.01*  --  9.26*   GLUC 153*  --  166*   CALCIUM 8.9  --  8.4   ALB  --   --  3.3*   AMMONIA  --  17*  --    [id number]  --  1.094  --      Thyroid Studies:   Lab Results   Component Value Date    BUQ9OIZLKRCU 1.094 08/22/2023     Vitamin B12   Lab Results   Component Value Date    DRBRHCPF90 410 08/22/2023     Folate   Lab Results   Component Value Date    FOLATE 7.3 08/22/2023     EKG   Lab Results   Component Value Date    VENTRATE 69 08/21/2023    ATRIALRATE 69 08/21/2023    PRINT 164 08/21/2023    QRSDINT 82 08/21/2023    QTINT 456 08/21/2023    QTCINT 488 08/21/2023    PAXIS 21 08/21/2023    QRSAXIS -8 08/21/2023    TWAVEAXIS 37 08/21/2023       Component Ref Range & Units 8/23/23 0414 8/20/23 0531 8/19/23 1053 7/29/22 0600 7/26/22 0918 1/27/22 0455 1/26/22 0532   Magnesium 1.9 - 2.7 mg/dL 2.6  2.0  1.8 Low   2.1 PATRICIA Shell, DO

## 2023-08-23 NOTE — PLAN OF CARE
Serum potassium is 4.3 on 3K2.5Ca bath. Will attempt for UF-1 kg as tolerated. Problem: METABOLIC, FLUID AND ELECTROLYTES - ADULT  Goal: Electrolytes maintained within normal limits  Description: INTERVENTIONS:  - Monitor labs and assess patient for signs and symptoms of electrolyte imbalances  - Administer electrolyte replacement as ordered  - Monitor response to electrolyte replacements, including repeat lab results as appropriate  - Instruct patient on fluid and nutrition as appropriate  Outcome: Progressing  Goal: Fluid balance maintained  Description: INTERVENTIONS:  - Monitor labs   - Monitor I/O and WT  - Instruct patient on fluid and nutrition as appropriate  - Assess for signs & symptoms of volume excess or deficit  Outcome: Progressing  Post-Dialysis RN Treatment Note    Blood Pressure:  Pre 136/52 mm/Hg  Post 175/60 mmHg   EDW  tbd kg    Weight:  Pre 108.7 kg   Post 107.9 kg   Mode of weight measurement: Bed Scale   Volume Removed  600 ml    Treatment duration 180 minutes    NS given  No    Treatment shortened?  No   Medications given during Rx oxycodone 5 mg, dilaudid 1mg given by Nai Pichardo RN   Estimated Kt/V  None Reported   Access type: Permacath/TDC   Access Issues: No    Report called to primary nurse   Yes       Ирина Desai RN

## 2023-08-24 ENCOUNTER — APPOINTMENT (INPATIENT)
Dept: RADIOLOGY | Facility: HOSPITAL | Age: 63
DRG: 474 | End: 2023-08-24
Payer: MEDICARE

## 2023-08-24 ENCOUNTER — APPOINTMENT (INPATIENT)
Dept: NON INVASIVE DIAGNOSTICS | Facility: HOSPITAL | Age: 63
DRG: 474 | End: 2023-08-24
Payer: MEDICARE

## 2023-08-24 PROBLEM — M54.50 LOW BACK PAIN: Status: RESOLVED | Noted: 2023-08-19 | Resolved: 2023-08-24

## 2023-08-24 PROBLEM — A49.9 POLYMICROBIAL BACTERIAL INFECTION: Status: ACTIVE | Noted: 2023-08-24

## 2023-08-24 LAB
AMPHETAMINES SERPL QL SCN: NEGATIVE
BACTERIA UR QL AUTO: ABNORMAL /HPF
BACTERIA WND AEROBE CULT: ABNORMAL
BARBITURATES UR QL: NEGATIVE
BENZODIAZ UR QL: NEGATIVE
BILIRUB UR QL STRIP: NEGATIVE
CLARITY UR: CLEAR
COCAINE UR QL: NEGATIVE
COLOR UR: YELLOW
GLUCOSE SERPL-MCNC: 118 MG/DL (ref 65–140)
GLUCOSE SERPL-MCNC: 148 MG/DL (ref 65–140)
GLUCOSE SERPL-MCNC: 150 MG/DL (ref 65–140)
GLUCOSE SERPL-MCNC: 211 MG/DL (ref 65–140)
GLUCOSE UR STRIP-MCNC: ABNORMAL MG/DL
GRAM STN SPEC: ABNORMAL
HGB UR QL STRIP.AUTO: ABNORMAL
HYALINE CASTS #/AREA URNS LPF: ABNORMAL /LPF
KETONES UR STRIP-MCNC: NEGATIVE MG/DL
LEUKOCYTE ESTERASE UR QL STRIP: NEGATIVE
METHADONE UR QL: NEGATIVE
NITRITE UR QL STRIP: NEGATIVE
NON-SQ EPI CELLS URNS QL MICRO: ABNORMAL /HPF
OPIATES UR QL SCN: NEGATIVE
OTHER STN SPEC: ABNORMAL
OXYCODONE+OXYMORPHONE UR QL SCN: POSITIVE
PCP UR QL: NEGATIVE
PH UR STRIP.AUTO: 7 [PH]
PROT UR STRIP-MCNC: >=300 MG/DL
RBC #/AREA URNS AUTO: ABNORMAL /HPF
SP GR UR STRIP.AUTO: 1.01 (ref 1–1.03)
THC UR QL: NEGATIVE
UROBILINOGEN UR QL STRIP.AUTO: 0.2 E.U./DL
WBC #/AREA URNS AUTO: ABNORMAL /HPF

## 2023-08-24 PROCEDURE — 87147 CULTURE TYPE IMMUNOLOGIC: CPT | Performed by: PODIATRIST

## 2023-08-24 PROCEDURE — 87077 CULTURE AEROBIC IDENTIFY: CPT | Performed by: PODIATRIST

## 2023-08-24 PROCEDURE — 93923 UPR/LXTR ART STDY 3+ LVLS: CPT | Performed by: SURGERY

## 2023-08-24 PROCEDURE — 87070 CULTURE OTHR SPECIMN AEROBIC: CPT | Performed by: PODIATRIST

## 2023-08-24 PROCEDURE — 99232 SBSQ HOSP IP/OBS MODERATE 35: CPT | Performed by: STUDENT IN AN ORGANIZED HEALTH CARE EDUCATION/TRAINING PROGRAM

## 2023-08-24 PROCEDURE — 87186 SC STD MICRODIL/AGAR DIL: CPT | Performed by: PODIATRIST

## 2023-08-24 PROCEDURE — 82948 REAGENT STRIP/BLOOD GLUCOSE: CPT

## 2023-08-24 PROCEDURE — 80307 DRUG TEST PRSMV CHEM ANLYZR: CPT | Performed by: INTERNAL MEDICINE

## 2023-08-24 PROCEDURE — 0QBN0ZZ EXCISION OF RIGHT METATARSAL, OPEN APPROACH: ICD-10-PCS | Performed by: PODIATRIST

## 2023-08-24 PROCEDURE — 97535 SELF CARE MNGMENT TRAINING: CPT

## 2023-08-24 PROCEDURE — 97167 OT EVAL HIGH COMPLEX 60 MIN: CPT

## 2023-08-24 PROCEDURE — 99232 SBSQ HOSP IP/OBS MODERATE 35: CPT | Performed by: PODIATRIST

## 2023-08-24 PROCEDURE — 93923 UPR/LXTR ART STDY 3+ LVLS: CPT

## 2023-08-24 PROCEDURE — 87081 CULTURE SCREEN ONLY: CPT | Performed by: STUDENT IN AN ORGANIZED HEALTH CARE EDUCATION/TRAINING PROGRAM

## 2023-08-24 PROCEDURE — 87040 BLOOD CULTURE FOR BACTERIA: CPT | Performed by: PHYSICIAN ASSISTANT

## 2023-08-24 PROCEDURE — 87205 SMEAR GRAM STAIN: CPT | Performed by: PODIATRIST

## 2023-08-24 PROCEDURE — 11044 DBRDMT BONE 1ST 20 SQ CM/<: CPT | Performed by: PODIATRIST

## 2023-08-24 PROCEDURE — C8929 TTE W OR WO FOL WCON,DOPPLER: HCPCS

## 2023-08-24 PROCEDURE — 99232 SBSQ HOSP IP/OBS MODERATE 35: CPT | Performed by: INTERNAL MEDICINE

## 2023-08-24 PROCEDURE — 81001 URINALYSIS AUTO W/SCOPE: CPT | Performed by: INTERNAL MEDICINE

## 2023-08-24 PROCEDURE — 93925 LOWER EXTREMITY STUDY: CPT

## 2023-08-24 PROCEDURE — 93925 LOWER EXTREMITY STUDY: CPT | Performed by: SURGERY

## 2023-08-24 RX ORDER — INSULIN GLARGINE 100 [IU]/ML
15 INJECTION, SOLUTION SUBCUTANEOUS
Status: DISCONTINUED | OUTPATIENT
Start: 2023-08-24 | End: 2023-09-06 | Stop reason: HOSPADM

## 2023-08-24 RX ORDER — INSULIN LISPRO 100 [IU]/ML
5 INJECTION, SOLUTION INTRAVENOUS; SUBCUTANEOUS
Status: DISCONTINUED | OUTPATIENT
Start: 2023-08-24 | End: 2023-09-06 | Stop reason: HOSPADM

## 2023-08-24 RX ORDER — VANCOMYCIN HYDROCHLORIDE 1 G/200ML
10 INJECTION, SOLUTION INTRAVENOUS 3 TIMES WEEKLY
Status: DISCONTINUED | OUTPATIENT
Start: 2023-08-24 | End: 2023-08-24

## 2023-08-24 RX ADMIN — SENNOSIDES AND DOCUSATE SODIUM 2 TABLET: 50; 8.6 TABLET ORAL at 09:15

## 2023-08-24 RX ADMIN — SEVELAMER HYDROCHLORIDE 800 MG: 800 TABLET ORAL at 12:13

## 2023-08-24 RX ADMIN — CARVEDILOL 12.5 MG: 12.5 TABLET, FILM COATED ORAL at 17:57

## 2023-08-24 RX ADMIN — HYDROMORPHONE HYDROCHLORIDE 1 MG: 1 INJECTION, SOLUTION INTRAMUSCULAR; INTRAVENOUS; SUBCUTANEOUS at 22:22

## 2023-08-24 RX ADMIN — NIFEDIPINE 30 MG: 30 TABLET, EXTENDED RELEASE ORAL at 09:16

## 2023-08-24 RX ADMIN — HEPARIN SODIUM 5000 UNITS: 5000 INJECTION INTRAVENOUS; SUBCUTANEOUS at 06:10

## 2023-08-24 RX ADMIN — OXYCODONE HYDROCHLORIDE 5 MG: 5 TABLET ORAL at 20:49

## 2023-08-24 RX ADMIN — METRONIDAZOLE 500 MG: 500 TABLET ORAL at 01:48

## 2023-08-24 RX ADMIN — ATORVASTATIN CALCIUM 80 MG: 80 TABLET, FILM COATED ORAL at 09:16

## 2023-08-24 RX ADMIN — INSULIN GLARGINE 15 UNITS: 100 INJECTION, SOLUTION SUBCUTANEOUS at 21:24

## 2023-08-24 RX ADMIN — ACETAMINOPHEN 975 MG: 325 TABLET ORAL at 01:48

## 2023-08-24 RX ADMIN — NIFEDIPINE 30 MG: 30 TABLET, EXTENDED RELEASE ORAL at 17:57

## 2023-08-24 RX ADMIN — HEPARIN SODIUM 5000 UNITS: 5000 INJECTION INTRAVENOUS; SUBCUTANEOUS at 14:18

## 2023-08-24 RX ADMIN — INSULIN LISPRO 5 UNITS: 100 INJECTION, SOLUTION INTRAVENOUS; SUBCUTANEOUS at 18:52

## 2023-08-24 RX ADMIN — METRONIDAZOLE 500 MG: 500 TABLET ORAL at 17:57

## 2023-08-24 RX ADMIN — FAMOTIDINE 10 MG: 20 TABLET, FILM COATED ORAL at 09:15

## 2023-08-24 RX ADMIN — SEVELAMER HYDROCHLORIDE 800 MG: 800 TABLET ORAL at 17:57

## 2023-08-24 RX ADMIN — CARVEDILOL 12.5 MG: 12.5 TABLET, FILM COATED ORAL at 09:16

## 2023-08-24 RX ADMIN — METHOCARBAMOL 500 MG: 500 TABLET ORAL at 20:49

## 2023-08-24 RX ADMIN — PERFLUTREN 0.6 ML/MIN: 6.52 INJECTION, SUSPENSION INTRAVENOUS at 08:53

## 2023-08-24 RX ADMIN — GABAPENTIN 200 MG: 250 SUSPENSION ORAL at 09:16

## 2023-08-24 RX ADMIN — SENNOSIDES AND DOCUSATE SODIUM 2 TABLET: 50; 8.6 TABLET ORAL at 17:57

## 2023-08-24 RX ADMIN — INSULIN LISPRO 4 UNITS: 100 INJECTION, SOLUTION INTRAVENOUS; SUBCUTANEOUS at 17:57

## 2023-08-24 RX ADMIN — CEFEPIME HYDROCHLORIDE 1000 MG: 1 INJECTION, SOLUTION INTRAVENOUS at 14:18

## 2023-08-24 RX ADMIN — INSULIN LISPRO 1 UNITS: 100 INJECTION, SOLUTION INTRAVENOUS; SUBCUTANEOUS at 21:24

## 2023-08-24 RX ADMIN — SEVELAMER HYDROCHLORIDE 800 MG: 800 TABLET ORAL at 09:16

## 2023-08-24 RX ADMIN — HEPARIN SODIUM 5000 UNITS: 5000 INJECTION INTRAVENOUS; SUBCUTANEOUS at 21:23

## 2023-08-24 RX ADMIN — METRONIDAZOLE 500 MG: 500 TABLET ORAL at 10:29

## 2023-08-24 RX ADMIN — GABAPENTIN 200 MG: 250 SUSPENSION ORAL at 18:05

## 2023-08-24 NOTE — PLAN OF CARE
Problem: Potential for Falls  Goal: Patient will remain free of falls  Description: INTERVENTIONS:  - Educate patient/family on patient safety including physical limitations  - Instruct patient to call for assistance with activity   - Consult OT/PT to assist with strengthening/mobility   - Keep Call bell within reach  - Keep bed low and locked with side rails adjusted as appropriate  - Keep care items and personal belongings within reach  - Initiate and maintain comfort rounds  - Make Fall Risk Sign visible to staff  - Offer Toileting every 2 Hours, in advance of need  - Initiate/Maintain bed alarm  - Obtain necessary fall risk management equipment: yellow socks  - Apply yellow socks and bracelet for high fall risk patients  - Consider moving patient to room near nurses station  Outcome: Progressing     Problem: MOBILITY - ADULT  Goal: Maintain or return to baseline ADL function  Description: INTERVENTIONS:  -  Assess patient's ability to carry out ADLs; assess patient's baseline for ADL function and identify physical deficits which impact ability to perform ADLs (bathing, care of mouth/teeth, toileting, grooming, dressing, etc.)  - Assess/evaluate cause of self-care deficits   - Assess range of motion  - Assess patient's mobility; develop plan if impaired  - Assess patient's need for assistive devices and provide as appropriate  - Encourage maximum independence but intervene and supervise when necessary  - Involve family in performance of ADLs  - Assess for home care needs following discharge   - Consider OT consult to assist with ADL evaluation and planning for discharge  - Provide patient education as appropriate  Outcome: Progressing  Goal: Maintains/Returns to pre admission functional level  Description: INTERVENTIONS:  - Perform BMAT or MOVE assessment daily.   - Set and communicate daily mobility goal to care team and patient/family/caregiver.    - Collaborate with rehabilitation services on mobility goals if consulted  - Perform Range of Motion 4 times a day. - Reposition patient every 2 hours.   - Dangle patient 3 times a day  - Stand patient 3 times a day  - Ambulate patient 3 times a day  - Out of bed to chair 3 times a day   - Out of bed for meals 3 times a day  - Out of bed for toileting  - Record patient progress and toleration of activity level   Outcome: Progressing     Problem: PAIN - ADULT  Goal: Verbalizes/displays adequate comfort level or baseline comfort level  Description: Interventions:  - Encourage patient to monitor pain and request assistance  - Assess pain using appropriate pain scale  - Administer analgesics based on type and severity of pain and evaluate response  - Implement non-pharmacological measures as appropriate and evaluate response  - Consider cultural and social influences on pain and pain management  - Notify physician/advanced practitioner if interventions unsuccessful or patient reports new pain  Outcome: Progressing     Problem: INFECTION - ADULT  Goal: Absence or prevention of progression during hospitalization  Description: INTERVENTIONS:  - Assess and monitor for signs and symptoms of infection  - Monitor lab/diagnostic results  - Monitor all insertion sites, i.e. indwelling lines, tubes, and drains  - Monitor endotracheal if appropriate and nasal secretions for changes in amount and color  - [place] appropriate cooling/warming therapies per order  - Administer medications as ordered  - Instruct and encourage patient and family to use good hand hygiene technique  - Identify and instruct in appropriate isolation precautions for identified infection/condition  Outcome: Progressing  Goal: Absence of fever/infection during neutropenic period  Description: INTERVENTIONS:  - Monitor WBC    Outcome: Progressing     Problem: SAFETY ADULT  Goal: Patient will remain free of falls  Description: INTERVENTIONS:  - Educate patient/family on patient safety including physical limitations  - Instruct patient to call for assistance with activity   - Consult OT/PT to assist with strengthening/mobility   - Keep Call bell within reach  - Keep bed low and locked with side rails adjusted as appropriate  - Keep care items and personal belongings within reach  - Initiate and maintain comfort rounds  - Make Fall Risk Sign visible to staff  - Offer Toileting every 2 Hours, in advance of need  - Initiate/Maintain bed alarm  - Obtain necessary fall risk management equipment: yellow socks  - Apply yellow socks and bracelet for high fall risk patients  - Consider moving patient to room near nurses station  Outcome: Progressing  Goal: Maintain or return to baseline ADL function  Description: INTERVENTIONS:  -  Assess patient's ability to carry out ADLs; assess patient's baseline for ADL function and identify physical deficits which impact ability to perform ADLs (bathing, care of mouth/teeth, toileting, grooming, dressing, etc.)  - Assess/evaluate cause of self-care deficits   - Assess range of motion  - Assess patient's mobility; develop plan if impaired  - Assess patient's need for assistive devices and provide as appropriate  - Encourage maximum independence but intervene and supervise when necessary  - Involve family in performance of ADLs  - Assess for home care needs following discharge   - Consider OT consult to assist with ADL evaluation and planning for discharge  - Provide patient education as appropriate  Outcome: Progressing  Goal: Maintains/Returns to pre admission functional level  Description: INTERVENTIONS:  - Perform BMAT or MOVE assessment daily.   - Set and communicate daily mobility goal to care team and patient/family/caregiver. - Collaborate with rehabilitation services on mobility goals if consulted  - Perform Range of Motion 4 times a day. - Reposition patient every 2 hours.   - Dangle patient 3 times a day  - Stand patient 3 times a day  - Ambulate patient 3 times a day  - Out of bed to chair 3 times a day   - Out of bed for meals 3 times a day  - Out of bed for toileting  - Record patient progress and toleration of activity level   Outcome: Progressing     Problem: DISCHARGE PLANNING  Goal: Discharge to home or other facility with appropriate resources  Description: INTERVENTIONS:  - Identify barriers to discharge w/patient and caregiver  - Arrange for needed discharge resources and transportation as appropriate  - Identify discharge learning needs (meds, wound care, etc.)  - Arrange for interpretive services to assist at discharge as needed  - Refer to Case Management Department for coordinating discharge planning if the patient needs post-hospital services based on physician/advanced practitioner order or complex needs related to functional status, cognitive ability, or social support system  Outcome: Progressing     Problem: Knowledge Deficit  Goal: Patient/family/caregiver demonstrates understanding of disease process, treatment plan, medications, and discharge instructions  Description: Complete learning assessment and assess knowledge base.   Interventions:  - Provide teaching at level of understanding  - Provide teaching via preferred learning methods  Outcome: Progressing     Problem: METABOLIC, FLUID AND ELECTROLYTES - ADULT  Goal: Electrolytes maintained within normal limits  Description: INTERVENTIONS:  - Monitor labs and assess patient for signs and symptoms of electrolyte imbalances  - Administer electrolyte replacement as ordered  - Monitor response to electrolyte replacements, including repeat lab results as appropriate  - Instruct patient on fluid and nutrition as appropriate  Outcome: Progressing  Goal: Fluid balance maintained  Description: INTERVENTIONS:  - Monitor labs   - Monitor I/O and WT  - Instruct patient on fluid and nutrition as appropriate  - Assess for signs & symptoms of volume excess or deficit  Outcome: Progressing     Problem: Nutrition/Hydration-ADULT  Goal: Nutrient/Hydration intake appropriate for improving, restoring or maintaining nutritional needs  Description: Monitor and assess patient's nutrition/hydration status for malnutrition. Collaborate with interdisciplinary team and initiate plan and interventions as ordered. Monitor patient's weight and dietary intake as ordered or per policy. Utilize nutrition screening tool and intervene as necessary. Determine patient's food preferences and provide high-protein, high-caloric foods as appropriate.      INTERVENTIONS:  - Monitor oral intake, urinary output, labs, and treatment plans  - Assess nutrition and hydration status and recommend course of action  - Evaluate amount of meals eaten  - Assist patient with eating if necessary   - Allow adequate time for meals  - Recommend/ encourage appropriate diets, oral nutritional supplements, and vitamin/mineral supplements  - Order, calculate, and assess calorie counts as needed  - Recommend, monitor, and adjust tube feedings and TPN/PPN based on assessed needs  - Assess need for intravenous fluids  - Provide specific nutrition/hydration education as appropriate  - Include patient/family/caregiver in decisions related to nutrition  Outcome: Progressing     Problem: SAFETY,RESTRAINT: NV/NON-SELF DESTRUCTIVE BEHAVIOR  Goal: Remains free of harm/injury (restraint for non violent/non self-detsructive behavior)  Description: INTERVENTIONS:  - Instruct patient/family regarding restraint use   - Assess and monitor physiologic and psychological status   - Provide interventions and comfort measures to meet assessed patient needs   - Identify and implement measures to help patient regain control  - Assess readiness for release of restraint   Outcome: Progressing  Goal: Returns to optimal restraint-free functioning  Description: INTERVENTIONS:  - Assess the patient's behavior and symptoms that indicate continued need for restraint  - Identify and implement measures to help patient regain control  - Assess readiness for release of restraint   Outcome: Progressing     Problem: Prexisting or High Potential for Compromised Skin Integrity  Goal: Skin integrity is maintained or improved  Description: INTERVENTIONS:  - Identify patients at risk for skin breakdown  - Assess and monitor skin integrity  - Assess and monitor nutrition and hydration status  - Monitor labs   - Assess for incontinence   - Turn and reposition patient  - Assist with mobility/ambulation  - Relieve pressure over bony prominences  - Avoid friction and shearing  - Provide appropriate hygiene as needed including keeping skin clean and dry  - Evaluate need for skin moisturizer/barrier cream  - Collaborate with interdisciplinary team   - Patient/family teaching  - Consider wound care consult   Outcome: Progressing

## 2023-08-24 NOTE — PLAN OF CARE
Problem: Potential for Falls  Goal: Patient will remain free of falls  Description: INTERVENTIONS:  - Educate patient/family on patient safety including physical limitations  - Instruct patient to call for assistance with activity   - Consult OT/PT to assist with strengthening/mobility   - Keep Call bell within reach  - Keep bed low and locked with side rails adjusted as appropriate  - Keep care items and personal belongings within reach  - Initiate and maintain comfort rounds  - Make Fall Risk Sign visible to staff  - Offer Toileting every 2 Hours, in advance of need  - Initiate/Maintain bee alarm  - Apply yellow socks and bracelet for high fall risk patients  - Consider moving patient to room near nurses station  Outcome: Progressing     Problem: PAIN - ADULT  Goal: Verbalizes/displays adequate comfort level or baseline comfort level  Description: Interventions:  - Encourage patient to monitor pain and request assistance  - Assess pain using appropriate pain scale  - Administer analgesics based on type and severity of pain and evaluate response  - Implement non-pharmacological measures as appropriate and evaluate response  - Consider cultural and social influences on pain and pain management  - Notify physician/advanced practitioner if interventions unsuccessful or patient reports new pain  Outcome: Progressing     Problem: SAFETY ADULT  Goal: Patient will remain free of falls  Description: INTERVENTIONS:  - Educate patient/family on patient safety including physical limitations  - Instruct patient to call for assistance with activity   - Consult OT/PT to assist with strengthening/mobility   - Keep Call bell within reach  - Keep bed low and locked with side rails adjusted as appropriate  - Keep care items and personal belongings within reach  - Initiate and maintain comfort rounds  - Make Fall Risk Sign visible to staff  - Offer Toileting every 2 Hours, in advance of need  - Initiate/Maintain bed alarm  - Apply yellow socks and bracelet for high fall risk patients  - Consider moving patient to room near nurses station  Outcome: Progressing

## 2023-08-24 NOTE — ASSESSMENT & PLAN NOTE
Polymicrobial bacteremia.  Enterobacter/Proteus/Streptococcus/Granulicatella  ID following  On cefepime and flagyl

## 2023-08-24 NOTE — PROGRESS NOTES
Progress Note - Podiatry  Nonnie Chicago 58 y.o. male MRN: [id number]  Unit/Bed#: [address] [place] [address] Encounter: [id number]    Assessment:  Fernanda Blamer grade 3 ulcer Sub first MPJ right foot. Rule out septic arthritis. Probable contiguous osteomyelitis right first MPJ. Diabetic neuropathy. Rule out peripheral artery disease. Plan:  Chart reviewed. MRI reviewed. At this time bedside debridement performed. Excisional debridement was performed of plantar wound. Utilizing both scissors as well as 15 blade scalpel all devitalized tissue from Fernanda Blamer grade 3 ulcer plantar aspect right foot removed down to bleeding base. Skin, subcutaneous fat capsule and bone removed. A piece of first metatarsal head was harvested for culture and sensitivity. Wound was then packed with Iodosorb wound packing and dry sterile dressing applied. At this time we will continue antibiotic course as per infectious disease. Awaiting results of culture and sensitivity. Patient is at high risk for first ray resection. This would afford surgical cure of osteomyelitis. Patient has been made aware but he is hoping to salvage the foot. Infectious disease input appreciated. Podiatry will follow. Awaiting results of arterial Doppler testing in order to determine feasibility/viability of any proposed surgery. Pending results vascular surgery consult may be indicated. Subjective/Objective   Chief Complaint:   Chief Complaint   Patient presents with   • Fall     Pt arrives BLS from home, reported that he was found on the ground, co of lower back pain. Pt awake, follows commands. Not answers the time and year "I don't know". Daughter called 911. Pt doesn't know how he fall and how long he has been on the floor. Subjective: Patient seen at bedside. He today, he is lucid. He understands the necessity for bedside debridement. Patient also understands the gravity of his situation and understands he is at risk for amputation.   He would like to try the salvage of the foot however he would be amenable to medical recommendation. Blood pressure 144/50, pulse 64, temperature 97.8 °F (36.6 °C), resp. rate 18, height 6' 1" (1.854 m), weight 115 kg (253 lb), SpO2 92 %. ,Body mass index is 33.38 kg/m². Invasive Devices     Peripheral Intravenous Line  Duration           Peripheral IV 08/23/23 Left;Ventral (anterior) Forearm 1 day                Physical Exam:   General appearance: alert, cooperative and no distress  Neuro/Vascular: No change in neurovascular status. Patient has profound hypoesthesia of the foot bilateral.      Extremity: Right foot demonstrates Delgadillo grade 3 ulcer that extends down to sesamoids of the right first MPJ. MRI is indicative of probable septic arthritis however wound debridement did not reveal any occult fluid accumulation. There was significant amount of phlegmon noted within the area. This was debrided down to bleeding base. There was bleeding noted during procedure however blood was more cyanotic in nature. At this time there is no evidence of occult sinus or abscess. Mild surrounding cellulitis of right first MPJ. Of note, x-rays of right foot are indicative of Charcot change of the midfoot and forefoot.               Labs and other studies:   CBC w/diff  Results from last 7 days   Lab Units 08/23/23  0414 08/22/23  0516   WBC Thousand/uL 8.85 8.06   HEMOGLOBIN g/dL 10.1* 11.6*   HEMATOCRIT % 31.8* 36.2*   PLATELETS Thousands/uL 219 230   NEUTROS PCT %  --  74   LYMPHS PCT %  --  12*   MONOS PCT %  --  10   EOS PCT %  --  2     BMP  Results from last 7 days   Lab Units 08/23/23  0414   POTASSIUM mmol/L 4.3   CHLORIDE mmol/L 94*   CO2 mmol/L 28   BUN mg/dL 62*   CREATININE mg/dL 9.26*   CALCIUM mg/dL 8.4     CMP  Results from last 7 days   Lab Units 08/23/23  0414 08/20/23  0531 08/19/23  1053   POTASSIUM mmol/L 4.3   < > 4.1   CHLORIDE mmol/L 94*   < > 94*   CO2 mmol/L 28   < > 32   BUN mg/dL 62*   < > 38*   CREATININE mg/dL 9.26*   < > 7.48*   CALCIUM mg/dL 8.4   < > 10.2   ALK PHOS U/L  --   --  69   ALT U/L  --   --  14   AST U/L  --   --  35    < > = values in this interval not displayed. @Culture@  Lab Results   Component Value Date    BLOODCX Received in Microbiology Lab. Culture in Progress. 08/24/2023    BLOODCX Received in Microbiology Lab. Culture in Progress. 08/24/2023    BLOODCX Enterobacter cloacae (A) 08/21/2023    BLOODCX Granulicatella adiacens (A) 08/21/2023    BLOODCX Proteus vulgaris group (A) 08/21/2023    BLOODCX No Growth at 72 hrs. 08/21/2023    BLOODCX No Growth After 5 Days. 07/26/2022    BLOODCX No Growth After 5 Days.  07/26/2022    WOUNDCULT 1+ Growth of Pseudomonas aeruginosa (A) 08/21/2023    WOUNDCULT 2+ Growth of Enterobacter cloacae (A) 08/21/2023    WOUNDCULT 2+ Growth of 08/21/2023         Current Facility-Administered Medications:   •  acetaminophen (TYLENOL) tablet 650 mg, 650 mg, Oral, Q6H PRN, Ivan Spring MD, 650 mg at 08/20/23 1141  •  acetaminophen (TYLENOL) tablet 975 mg, 975 mg, Oral, Q8H, Joel Dalton MD, 975 mg at 08/24/23 0148  •  atorvastatin (LIPITOR) tablet 80 mg, 80 mg, Oral, Daily, Ivan Spring MD, 80 mg at 08/24/23 0916  •  carvedilol (COREG) tablet 12.5 mg, 12.5 mg, Oral, BID, Ivan Spring MD, 12.5 mg at 08/24/23 0916  •  cefepime (MAXIPIME) IVPB (premix in dextrose) 1,000 mg 50 mL, 1,000 mg, Intravenous, Q24H, Jaren Méndez DO, Last Rate: 100 mL/hr at 08/24/23 1418, 1,000 mg at 08/24/23 1418  •  famotidine (PEPCID) tablet 10 mg, 10 mg, Oral, Daily, Ivan Spring MD, 10 mg at 08/24/23 0915  •  gabapentin (NEURONTIN) oral solution 200 mg, 200 mg, Oral, BID, Jaren Méndez DO, 200 mg at 08/24/23 0916  •  haloperidol lactate (HALDOL) injection 2 mg, 2 mg, Intramuscular, Q6H PRN, Jaren Méndez DO, 2 mg at 08/23/23 1441  •  heparin (porcine) subcutaneous injection 5,000 Units, 5,000 Units, Subcutaneous, Q8H Author MD [facility], 5,000 Units at 08/24/23 1418  •  HYDROmorphone (DILAUDID) injection 1 mg, 1 mg, Intravenous, Q4H PRN, Ольга Hernandez MD, 1 mg at 08/23/23 1007  •  insulin lispro (HumaLOG) 100 units/mL subcutaneous injection 1-5 Units, 1-5 Units, Subcutaneous, HS, Ольга Hernandez MD, 1 Units at 08/22/23 2124  •  insulin lispro (HumaLOG) 100 units/mL subcutaneous injection 2-12 Units, 2-12 Units, Subcutaneous, TID AC, 2 Units at 08/23/23 1234 **AND** Fingerstick Glucose (POCT), , , TID AC, Melissa Dalton MD  •  lidocaine (LIDODERM) 5 % patch 1 patch, 1 patch, Topical, Daily, Ольга Hernandez MD, 1 patch at 08/23/23 1231  •  LORazepam (ATIVAN) injection 0.5 mg, 0.5 mg, Intravenous, Q6H PRN, Ольга Hernandez MD, 0.5 mg at 08/22/23 1240  •  methocarbamol (ROBAXIN) tablet 500 mg, 500 mg, Oral, Q12H PRN, Ольга Hernandez MD, 500 mg at 08/19/23 1423  •  metroNIDAZOLE (FLAGYL) tablet 500 mg, 500 mg, Oral, Q8H [facility], Jaren Méndez DO, 500 mg at 08/24/23 1029  •  NIFEdipine (PROCARDIA XL) 24 hr tablet 30 mg, 30 mg, Oral, BID, Joel Dalton MD, 30 mg at 08/24/23 0916  •  ondansetron (ZOFRAN) injection 4 mg, 4 mg, Intravenous, Q6H PRN, Ольга Hernandez MD  •  oxyCODONE (ROXICODONE) IR tablet 5 mg, 5 mg, Oral, Q8H PRN, Melissa Dalton MD, 5 mg at 08/23/23 2908  •  senna-docusate sodium (SENOKOT S) 8.6-50 mg per tablet 2 tablet, 2 tablet, Oral, BID, Jaren Méndez DO, 2 tablet at 08/24/23 0915  •  sevelamer (RENAGEL) tablet 800 mg, 800 mg, Oral, TID With Meals, Ольга Hernandez MD, 800 mg at 08/24/23 1213    Imaging: I have personally reviewed pertinent films in PACS  EKG, Pathology, and Other Studies: I have personally reviewed pertinent reports. VTE Pharmacologic Prophylaxis: Sequential compression device (Venodyne)   VTE Mechanical Prophylaxis: sequential compression device          Counseling and Coordination of care  I spent 55 minutes face-to-face with patient today.  More than 50% was spent in counseling & coordination of care. Counseled patient regarding need for bedside debridement in order to determine infectious organism. Patient was advised on possible treatments for osteomyelitis. This would include but not limited to periodic debridements or possibly ray resection. Paige Cuevas DPM

## 2023-08-24 NOTE — PROGRESS NOTES
Piyush Aguilar is a 58 y.o. male who is currently ordered Vancomycin IV with management by the Pharmacy Consult service. Relevant clinical data and objective / subjective history reviewed. Vancomycin Assessment:  Indication and Goal AUC/Trough: Bone/joint infection (goal -600, trough >10), -600, trough >10  Clinical Status:   Micro:   Renal Function:  SCr: 9.26 mg/dL (from 23)  CrCl: 10.7 mL/min  Renal replacement: HD  Days of Therapy: 3  Current Dose: 1g IV when trough < 15  Vancomycin Plan:  New Dosinmg IV 3 times weekly after HD on T, , Sat  Estimated AUC: 400-600 mcg*hr/mL  Estimated Trough: >10 mcg/mL  Next Level: 23 @0600  Renal Function Monitoring: Daily BMP and [place] will continue to follow closely for s/sx of nephrotoxicity, infusion reactions and appropriateness of therapy. BMP and CBC will be ordered per protocol. We will continue to follow the patient’s culture results and clinical progress daily.     Destin Bennett Pharmacist

## 2023-08-24 NOTE — PROGRESS NOTES
NEPHROLOGY PROGRESS NOTE   Fabrice Pacheco 58 y.o. male MRN: [id number]  Unit/Bed#: [address] [place] [address]- Encounter: [id number]  Reason for Consult: Management of ESRD    ASSESSMENT AND PLAN:  59 yo man with PMH of ESRD on HD TTS p/w AMS.   Nephrology is consulted for management of ESRD                PLAN:     #ESRD on HD TTS:  • Dialysis unit/days:[facility]   • Access: Right IJ PermCath  • Had dialysis yesterday, well-tolerated  • Plan to continue HD as per schedule, next dialysis tomorrow  • Fluid restriction 1-1.5L/d  • Adjust medications to GFR<10  • Avoid opioids      #Volume status/hypertension:  • Volume:   Euvolemic  • Blood pressure:    Borderline hypertension /50mmhg   • low-sodium diet  • Coreg 12.5 twice daily  • Nifedipine 30 mg twice daily     #Secondary Hyperparasitoidism   • On sevelamer only with meals     # Anemia of Kidney Disease  • Current hemoglobin: 10.1 mg/dL  • At goal  • Treatment:  • Transfuse for hemoglobin less than 7.0 per primary service          #AMS  • Unclear etiology thought to be secondary to sepsis  • X-ray dialysis today  for better clearance    #Bacteremia  · PermCath removed yesterday due to concern of deep infection  · Currently on line holiday   · patient will require temporary dialysis line tomorrow for dialysis      SUBJECTIVE:  Patient seen and examined at bedside. No chest pain, shortness of breath, nausea, vomiting, abdominal pain or diarrhea.      OBJECTIVE:  Current Weight: Weight - Scale: 115 kg (253 lb)  Vitals:    08/24/23 0806   BP:    Pulse: 64   Resp: 18   Temp: 97.8 °F (36.6 °C)   SpO2: 92%       Intake/Output Summary (Last 24 hours) at 8/24/2023 0942  Last data filed at 8/23/2023 1133  Gross per 24 hour   Intake 300 ml   Output 1100 ml   Net -800 ml     Wt Readings from Last 3 Encounters:   08/24/23 115 kg (253 lb)   07/31/22 102 kg (225 lb 6.4 oz)   07/06/22 106 kg (233 lb 11 oz)     Temp Readings from Last 3 Encounters:   08/24/23 97.8 °F (36.6 °C)   07/31/22 98.4 °F (36.9 °C) (Temporal)   07/13/22 (!) 97 °F (36.1 °C) (Oral)     BP Readings from Last 3 Encounters:   08/24/23 144/50   07/31/22 118/60   07/14/22 139/52     Pulse Readings from Last 3 Encounters:   08/24/23 64   07/31/22 61   07/14/22 65        General:  no acute distress at this time  Skin:  No acute rash  Eyes:  No scleral icterus and noninjected  ENT:  mucous membranes moist  Neck:  no carotid bruits  Chest:  Clear to auscultation percussion, good respiratory effort, no use of accessory respiratory muscles  CVS:  Regular rate and rhythm without rub   Abdomen:  soft and nontender   Extremities: no significant lower extremity edema  Neuro:  No gross focality  Psych:  Alert , cooperative       Medications:    Current Facility-Administered Medications:   •  acetaminophen (TYLENOL) tablet 650 mg, 650 mg, Oral, Q6H PRN, Manish Romero MD, 650 mg at 08/20/23 1141  •  acetaminophen (TYLENOL) tablet 975 mg, 975 mg, Oral, Q8H, Joel Dalton MD, 975 mg at 08/24/23 0148  •  atorvastatin (LIPITOR) tablet 80 mg, 80 mg, Oral, Daily, Manish Romero MD, 80 mg at 08/24/23 0916  •  carvedilol (COREG) tablet 12.5 mg, 12.5 mg, Oral, BID, Manish Romero MD, 12.5 mg at 08/24/23 0916  •  cefepime (MAXIPIME) IVPB (premix in dextrose) 1,000 mg 50 mL, 1,000 mg, Intravenous, Q24H, Jaren Méndez DO, Last Rate: 100 mL/hr at 08/23/23 1619, 1,000 mg at 08/23/23 1619  •  famotidine (PEPCID) tablet 10 mg, 10 mg, Oral, Daily, Manish Romero MD, 10 mg at 08/24/23 0915  •  gabapentin (NEURONTIN) oral solution 200 mg, 200 mg, Oral, BID, Jaren Méndez DO, 200 mg at 08/24/23 0916  •  haloperidol lactate (HALDOL) injection 2 mg, 2 mg, Intramuscular, Q6H PRN, Jaren Méndez DO, 2 mg at 08/23/23 1441  •  heparin (porcine) subcutaneous injection 5,000 Units, 5,000 Units, Subcutaneous, Q8H [facility], Joel Dalton MD, 5,000 Units at 08/24/23 0610  •  HYDROmorphone (DILAUDID) injection 1 mg, 1 mg, Intravenous, Q4H PRN, Linda Caldwell MD Sha, 1 mg at 08/23/23 1007  •  insulin lispro (HumaLOG) 100 units/mL subcutaneous injection 1-5 Units, 1-5 Units, Subcutaneous, HS, Aditya Garibay MD, 1 Units at 08/22/23 2124  •  insulin lispro (HumaLOG) 100 units/mL subcutaneous injection 2-12 Units, 2-12 Units, Subcutaneous, TID AC, 2 Units at 08/23/23 1234 **AND** Fingerstick Glucose (POCT), , , TID AC, Aditya Garibay MD  •  lidocaine (LIDODERM) 5 % patch 1 patch, 1 patch, Topical, Daily, Aditya Garibay MD, 1 patch at 08/23/23 1231  •  LORazepam (ATIVAN) injection 0.5 mg, 0.5 mg, Intravenous, Q6H PRN, Aditya Garibay MD, 0.5 mg at 08/22/23 1240  •  methocarbamol (ROBAXIN) tablet 500 mg, 500 mg, Oral, Q12H PRN, Aditya Garibay MD, 500 mg at 08/19/23 1423  •  metroNIDAZOLE (FLAGYL) tablet 500 mg, 500 mg, Oral, Q8H [address], Jaren Souzae, DO, 500 mg at 08/24/23 0148  •  NIFEdipine (PROCARDIA XL) 24 hr tablet 30 mg, 30 mg, Oral, BID, Aditya Garibay MD, 30 mg at 08/24/23 0916  •  ondansetron (ZOFRAN) injection 4 mg, 4 mg, Intravenous, Q6H PRN, Aditya Garibay MD  •  oxyCODONE (ROXICODONE) IR tablet 5 mg, 5 mg, Oral, Q8H PRN, Aditya Garibay MD, 5 mg at 08/23/23 3806  •  senna-docusate sodium (SENOKOT S) 8.6-50 mg per tablet 2 tablet, 2 tablet, Oral, BID, Jaren Méndez, DO, 2 tablet at 08/24/23 0915  •  sevelamer (RENAGEL) tablet 800 mg, 800 mg, Oral, TID With Meals, Aditya Garibay MD, 800 mg at 08/24/23 0916  •  vancomycin (VANCOCIN) IVPB (premix in dextrose) 1,000 mg 200 mL, 10 mg/kg (Adjusted), Intravenous, Once per day on Tue Thu Jaren Rich DO    Laboratory Results:  Results from last 7 days   Lab Units 08/23/23  0414 08/22/23  0516 08/21/23  0546 08/20/23  0531 08/19/23  1053   WBC Thousand/uL 8.85 8.06 7.42 11.35* 14.01*   HEMOGLOBIN g/dL 10.1* 11.6* 11.8* 11.7* 11.4*   HEMATOCRIT % 31.8* 36.2* 37.0 37.3 35.5*   PLATELETS Thousands/uL 219 230 195 211 213   SODIUM mmol/L 133* 133* 134* 134* 134*   POTASSIUM mmol/L 4.3 4.6 4.1 3.9 4.1   CHLORIDE mmol/L 94* 93* 94* 94* 94*   CO2 mmol/L 28 27 28 31 32   BUN mg/dL 62* 64* 49* 27* 38*   CREATININE mg/dL 9.26* 10.01* 7.50* 5.15* 7.48*   CALCIUM mg/dL 8.4 8.9 8.8 9.3 10.2   MAGNESIUM mg/dL 2.6  --   --  2.0 1.8*   PHOSPHORUS mg/dL 2.5  --   --   --  1.8*       MRI foot/forefoot toes right wo contrast   Final Result by Bert Addison MD (08/23 3808)   First submetatarsal head plantar skin ulceration with suspected draining sinus tract extending to the first metatarsal phalangeal joint. Findings raise suspicion for septic arthropathy including marrow changes involving both the tibial and fibular    sesamoid bones consistent with osteomyelitis. Minimal marrow changes across the first metatarsal phalangeal joint at this time. The study was marked in [facility] for immediate notification. Workstation performed: [id number]         MRI brain wo contrast   Final Result by Loree Jane MD (08/22 3228)      Limited, motion degraded examination. There is no evidence for an acute territorial infarct. Workstation performed: [id number]         XR chest portable   Final Result by Johanne Kowalski MD (08/24 9683)      Increased reticular opacity in the medial aspect of the right lung base compared to 1/21/2022, which can represent infectious pneumonitis, aspiration pneumonitis or subsegmental atelectasis. Clinical correlation is recommended. The study was marked in [employer] for significant notification. Workstation performed: [id number]         CTA head and neck w wo contrast   Final Result by Bonnye Aschoff, MD (08/20 2054)      No acute intracranial abnormality. Tiny hypodensity right posterior inferior putamen, likely chronic lacunar infarct or dilated perivascular space. Negative CTA head and neck for large vessel occlusion, dissection, aneurysm, or high-grade stenosis. Possible tracheobronchomalacia.       Additional chronic/incidental findings as detailed above. The study was marked in [facility] for immediate notification. Workstation performed: [id number]         VAS lower limb arterial duplex, complete bilateral    (Results Pending)   XR foot 2 vw left    (Results Pending)   XR foot 3+ vw right    (Results Pending)   IR tunneled central line removal    (Results Pending)       Portions of the record may have been created with voice recognition software. Occasional wrong word or "sound a like" substitutions may have occurred due to the inherent limitations of voice recognition software. Read the chart carefully and recognize, using context, where substitutions have occurred.

## 2023-08-24 NOTE — OCCUPATIONAL THERAPY NOTE
Occupational Therapy Evaluation/Treatment       08/24/23 1410   Note Type   Note type Evaluation   Pain Assessment   Pain Assessment Tool 0-10   Pain Score No Pain   Restrictions/Precautions   RLE Weight Bearing Per Order (S)  Other  (heel weight bearing with assistive device per Dr Dontrell Sharif via [employer] Text)   Other Precautions Chair Alarm; Bed Alarm; Fall Risk   Home Living   Type of [address] Multi-level; Able to live on main level with bedroom/bathroom;Stairs to enter with rails   Bathroom Shower/Tub Walk-in shower   [address];Walker   Additional Comments Patient reports prior to admission pt was independent with all ADLS and driving and completing grocery shopping   Prior Function   Level of [place] Independent with ADLs; Independent with functional mobility; Independent with IADLS   Lives With Alone   Receives Help From Family   IADLs Independent with driving; Independent with meal prep; Independent with medication management   Falls in the last 6 months 1 to 4   Comments Patient admitted after being found on floor x 2 days.    Subjective   Subjective "I want to walk"   ADL   Eating Assistance 7  Independent   Grooming Assistance 7  Independent   UB Bathing Assistance 4  Minimal Assistance   LB Bathing Assistance 3  Moderate Assistance   UB Dressing Assistance 4  Minimal Assistance   LB Dressing Assistance 3  Moderate Assistance   Toileting Assistance  4  Minimal Assistance   Bed Mobility   Supine to Sit 4  Minimal assistance   Transfers   Sit to Stand 3  Moderate assistance   Additional items Assist x 1;Verbal cues   Stand to Sit 4  Minimal assistance   Additional items Assist x 1;Verbal cues   Functional Mobility   Functional Mobility 4  Minimal assistance   Additional Comments functional household distance with RW with constant verbal cues for heel weight bearing only on RLE   Balance   Static Sitting Fair +   Dynamic Sitting Fair   Static Standing Fair - Dynamic Standing Poor +   Activity Tolerance   Activity Tolerance Patient limited by fatigue  (weakness)   Nurse Made Aware yes   RUE Assessment   RUE Assessment WFL   LUE Assessment   LUE Assessment WFL   Cognition   Overall Cognitive Status WFL   Arousal/Participation Cooperative   Attention Within functional limits   Orientation Level Oriented X4   Following Commands Follows multistep commands with increased time or repetition   Assessment   Limitation Decreased ADL status; Decreased UE strength;Decreased Safe judgement during ADL;Decreased endurance;Decreased self-care trans;Decreased high-level ADLs  (decreased balance and mobility)   Prognosis Good   Assessment Patient evaluated by Occupational Therapy. Patient admitted with Altered mental status. The patients occupational profile, medical and therapy history includes a extensive additional review of physical, cognitive, or psychosocial history related to current functional performance. Comorbidities affecting functional mobility and ADLS include: diabetes, hypertension and TIA and L toe amputation. Prior to admission, patient was independent with functional mobility without assistive device, independent with ADLS and independent with IADLS. The evaluation identifies the following performance deficits: weakness, impaired balance, decreased endurance, increased fall risk, new onset of impairment of functional mobility, decreased ADLS, decreased IADLS, decreased activity tolerance, decreased safety awareness, impaired judgement and decreased strength, that result in activity limitations and/or participation restrictions. This evaluation requires clinical decision making of high complexity, because the patient presents with comorbidites that affect occupational performance and required significant modification of tasks or assistance with consideration of multiple treatment options.   The Barthel Index was used as a functional outcome tool presenting with a score of Barthel Index Score: 55, indicating marked limitations of functional mobility and ADLS. The patient's raw score on the -PAC Daily Activity Inpatient Short Form is 18. A raw score of less than 19 suggests the patient may benefit from discharge to post-acute rehabilitation services. Please refer to the recommendation of the Occupational Therapist for safe discharge planning. Patient will benefit from skilled Occupational Therapy services to address above deficits and facilitate a safe return to prior level of function. Goals   Patient Goals to get stronger   STG Time Frame   (1-7 days)   Short Term Goal  Goals established to promote Patient Goals: to get stronger:  Grooming: supervision standing at sink; Bathing: min assist; Upper Body Dressing supervision; Lower Body Dressing: min assist; Toileting: supervision; Patient will increase ambulatory standard toilet transfer to supervision with rolling walker to increase performance and safety with ADLS and functional mobility; Patient will increase standing tolerance to 3 minutes during ADL task to decrease assistance level and decrease fall risk; Patient will increase bed mobility to supervision in preparation for ADLS and transfers; Patient will increase functional mobility to and from bathroom with rolling walker with supervision with R LE heel weight bearing only to increase performance with ADLS and to use a toilet; Patient will tolerate 5 minutes of UE ROM/strengthening to increase general activity tolerance and performance in ADLS/IADLS; Patient will improve functional activity tolerance to 10 minutes of sustained functional tasks to increase participation in basic self-care and decrease assistance level;   Patient will increase dynamic standing balance to fair- to improve postural stability and decrease fall risk during standing ADLS and transfers. LTG Time Frame   (8-14 days)   Long Term Goal Grooming: independent standing at sink;  Bathing: supervision; Upper Body Dressing independent; Lower Body Dressing: supervision; Toileting: independent; Patient will increase ambulatory standard toilet transfer to independent with rolling walker to increase performance and safety with ADLS and functional mobility; Patient will increase standing tolerance to 6 minutes during ADL task to decrease assistance level and decrease fall risk; Patient will increase bed mobility to independent in preparation for ADLS and transfers; Patient will increase functional mobility to and from bathroom with rolling walker independently with R LE heel weight bearing only to increase performance with ADLS and to use a toilet; Patient will tolerate 10 minutes of UE ROM/strengthening to increase general activity tolerance and performance in ADLS/IADLS; Patient will improve functional activity tolerance to 20 minutes of sustained functional tasks to increase participation in basic self-care and decrease assistance level;   Patient will increase dynamic standing balance to fair to improve postural stability and decrease fall risk during standing ADLS and transfers. Pt will score >/= 22/24 on AM-PAC Daily Activity Inpatient scale to promote safe independence with ADLs and functional mobility; Pt will score >/= 85/100 on Barthel Index in order to decrease caregiver assistance needed and increase ability to perform ADLs and functional mobility. Plan   Treatment Interventions ADL retraining;Functional transfer training;UE strengthening/ROM; Endurance training;Patient/family training;Equipment evaluation/education; Activityengagement; Compensatory technique education   Goal Expiration Date 09/07/23   OT Frequency 3-5x/wk   Recommendation   OT Discharge Recommendation Post acute rehabilitation services   [facility] Daily Activity Inpatient   Lower Body Dressing 2   Bathing 2   Toileting 3   Upper Body Dressing 3   Grooming 4   Eating 4   Daily Activity Raw Score 18   Daily Activity Standardized Score (Calc for Raw Score >=11) 38.66   AM-PAC Applied Cognition Inpatient   Following a Speech/Presentation 4   Understanding Ordinary Conversation 4   Taking Medications 4   Remembering Where Things Are Placed or Put Away 4   Remembering List of 4-5 Errands 3   Taking Care of Complicated Tasks 3   Applied Cognition Raw Score 22   Applied Cognition Standardized Score 47.83   Barthel Index   Feeding 10   Bathing 0   Grooming Score 5   Dressing Score 5   Bladder Score 10   Bowels Score 10   Toilet Use Score 5   Transfers (Bed/Chair) Score 10   Mobility (Level Surface) Score 0   Stairs Score 0   Barthel Index Score 55   Additional Treatment Session   Start Time 1400   End Time 1410   Treatment Assessment S: denies pain O: Patient donned underwear with min assist.  Patient requires max assist to shawna B socks. Sit to stand min assist from raised bed. Stand to sit min assist.  A: Patient is generally weak and deconditioned. Patient requires mod assist for LB dressing. Patient is now requiring assist of RW and only heel weight bearing on RLE. Patient was independent with all ADLS and mobility without assistive device prior to [facility].   P: [id number] [facility] Number  Karey Tracey [id number] [facility] OTR/L [phone]

## 2023-08-24 NOTE — PROGRESS NOTES
Progress Note - Infectious Disease   Monica Tavarez 58 y.o. male MRN: [id number]  Unit/Bed#: [address] [facility] 421-01 Encounter: [id number]      Impression/Plan:  1. Polymicrobic bacteremia. In setting of #2/3/4/5, admission blood cultures 1 of 2 sets growing Granulicatella adiacens, Enterobacter cloacae, Proteus vulgaris and Bacteroides fragilis on preliminary blood culture ID panel. Patient with a permacath since 1/27/22 as well as an active right foot diabetic ulcer. Permacath removed, 8/24/23 cath tip pending  -continues empiric Vancomycin/Cefepime/Flagyl at present  -if no MRSA/enterococcal growth, can hold Vancomycin.  -follow up cath tip culture.  -follow up repeat blood cultures collected 8/24/23  -follow up TTE     2. Encephalopathy. 8/22/23 MRI Brain: limited to motion. No evident acute infarct  Increased altered mental status following HD session 8/23/23  -infectious work up/management as above  -monitor mental status  -neurology on board     3. Right diabetic foot ulcer with cellulitis and concern for septic joint and osteomyelitis on MRI.  8/21/23 Wound cx: 1+ pseudomonas aeruginosa pansensitive, 2+ Enterobacter cloacae, 2+ MSF. 8/23/23 MRI right foot: 1st submetatarsal head plantar ulcer with suspected sinus tract to 1st MTP joint concerning for septic arthropathy and marrow changes of tibia/fibia/sesamoid bones c/w osteomyelitis   -continue antibiotics as above  -local wound care  -podiatry on boad  -follow up plan for bedside I&D today    4. Leukocytosis, POA. WBC 14 K. In setting of #1/3. Downtrended  -continue antibiotics as below.  -follow-up cultures and adjust antibiotics as needed  -monitor temperature and hemodynamics  -serial exam  -recheck CBC and BMP in a.m.     5. ESRD on HD via right chest permacath placed 1/2023.   -renal dose adjust antibiotic as needed  -volume/HD management per nephrology  -recommend remove permacath and place temp cath      6. Type 2 Diabetes Mellitus.  HgbA1C 6.6  -monitor symptoms  -symptomatic management per primary care team  -advancing diet as tolerated     Antibiotics:  Vancomycin/Cefepime/Flagyl D3     I have discussed the above management plan in detail with patient, RN, and Dr. Isaias Pardo of the primary service. Subjective:  Patient has no fever, chills, sweats reported overnight; no nausea, vomiting, diarrhea; no cough, shortness of breath; no foot pain. Does not recall much of yesterday or meeting me yesterday. Objective:  Vitals:  Temp:  [97.8 °F (36.6 °C)-99 °F (37.2 °C)] 97.8 °F (36.6 °C)  HR:  [64-69] 64  Resp:  [16-20] 18  BP: (137-157)/(49-69) 144/50  SpO2:  [92 %-93 %] 92 %  Temp (24hrs), Av.3 °F (36.8 °C), Min:97.8 °F (36.6 °C), Max:99 °F (37.2 °C)  Current: Temperature: 97.8 °F (36.6 °C)    Physical Exam:   General Appearance:  58year old male, chronically debilitated, nontoxic appearance today, alert and ortient, no acute distress. HEENT: Atraumatic normocephalic   Throat: Oropharynx moist. Poor dentition   Pulmonary:   Normal respiratory excursion without accessory muscle use   Cardiac:  RRR   Abdomen:   Soft, non-tender, non-distended, protuberant   Extremities: Venous stained LEs, multiple scabbed abrasions of knees and shins. Right foot gauze/ace wrap intact with some blood-tinged dry plantar staining strike through. : No gaitan, no SPT   Psychiatric: Awake, cooperative   Skin: No new rashes. IV site nontender. Right chest permacath removed. Site nontender.        Labs, Imaging, & Other studies:   All pertinent labs and imaging studies were personally reviewed  Results from last 7 days   Lab Units 23  0414 23  0516 23  0546   WBC Thousand/uL 8.85 8.06 7.42   HEMOGLOBIN g/dL 10.1* 11.6* 11.8*   PLATELETS Thousands/uL 219 230 195     Results from last 7 days   Lab Units 23  0414 23  0516 23  0546 23  0531 23  1053   SODIUM mmol/L 133* 133* 134*   < > 134*   POTASSIUM mmol/L 4.3 4.6 4.1   < > 4.1 CHLORIDE mmol/L 94* 93* 94*   < > 94*   CO2 mmol/L 28 27 28   < > 32   BUN mg/dL 62* 64* 49*   < > 38*   CREATININE mg/dL 9.26* 10.01* 7.50*   < > 7.48*   EGFR ml/min/1.73sq m 5 4 6   < > 6   CALCIUM mg/dL 8.4 8.9 8.8   < > 10.2   AST U/L  --   --   --   --  35   ALT U/L  --   --   --   --  14   ALK PHOS U/L  --   --   --   --  69    < > = values in this interval not displayed. Results from last 7 days   Lab Units 08/24/23  0618 08/21/23  1756 08/21/23  1627 08/21/23  1423 08/19/23  1759   BLOOD CULTURE  Received in Microbiology Lab. Culture in Progress. Received in Microbiology Lab. Culture in Progress.   --  Enterobacter cloacae*  Granulicatella adiacens*  Proteus vulgaris group* No Growth at 48 hrs.  --    GRAM STAIN RESULT   --  1+ Polys*  1+ Gram positive cocci in pairs*  1+ Gram negative rods* Gram negative rods*  Gram positive cocci in chains*  --   --    WOUND CULTURE   --  1+ Growth of Pseudomonas aeruginosa*  2+ Growth of Enterobacter cloacae*  2+ Growth of  --   --   --    MRSA CULTURE ONLY   --   --   --   --  No Methicillin Resistant Staphlyococcus aureus (MRSA) isolated     Results from last 7 days   Lab Units 08/22/23  1053   PROCALCITONIN ng/ml 112.62*     Results from last 7 days   Lab Units 08/22/23  1053   CRP mg/L 211.1*     Results from last 7 days   Lab Units 08/22/23  1053   FERRITIN ng/mL 2,584*

## 2023-08-24 NOTE — PROGRESS NOTES
[id number] [facility]  Progress Note  Name: Matt Mohan  MRN: [id number]  Unit/Bed#: 913 [facility] 421-01 I Date of Admission: 8/19/2023   Date of Service: 8/24/2023 I Hospital Day: 5    Assessment/Plan   * Altered mental status  Assessment & Plan  Patient has been confused and more agitated and not following commands and refusing all treatment today. Pending urine drug screen  Neurology consult pending at the current time-based on further course might need to consider MRI of the brain 2. Patient has known history of TIAs  CT of the head and neck with no acute intracranial abnormality with tiny hypodensity in the right posterior inferior putamen likely chronic lacunar infarct dilated prevascular space. Negative CTA for large vessel occlusion dissection or aneurysm with possible tracheobronchomalacia  Discussed with patient's friend-no history of alcohol use or drug use  Ammonia and TSH level were normal  Patient continues to remain intermittently agitated, confused, not following commands  Psychiatry was consulted-recommending Haldol 2 mg p.o. every 4 hours as needed for agitation at 2 mg IM as needed for severe agitation. Started septic work-up also as patient noted to have deep left foot wound-patient has been afebrile and had white count initially which has since normalized. Suspect most symptoms 2/2 infection see below  B12, folate, TSH, ammonia WNL  Mentation appears to be improved    Diabetic ulcer of left midfoot associated with type 2 diabetes mellitus, limited to breakdown of skin [facility])  Assessment & Plan  Lab Results   Component Value Date    HGBA1C 6.6 (H) 07/07/2022       Recent Labs     08/22/23  2121 08/23/23  0745 08/23/23  1110 08/23/23  1624   POCGLU 186* 241* 194* 183*       Blood Sugar Average: Last 72 hrs:  (P) 467.9941927584601889     Podiatry consulted for Delgadillo grade 1 ulcer submetatarsal 1 left foot.    S/P Debridement  Noted to have elevated CRP, procalcitonin,  1 of 2 blood cultures growing G- rods and G+ cocci in chains  8/22 Started vanc cefepime and flagyl  8/23 ID switched abx to cefepime and flagyl, will continue  F/U final blood and wound cultures  MRI foot First submetatarsal head plantar skin ulceration with suspected draining sinus tract extending to the first metatarsal phalangeal joint suspicious for septic arthropathy  8/24 s/p I&D by podiatry      Polymicrobial bacterial infection  Assessment & Plan  Polymicrobial bacteremia.  Enterobacter/Proteus/Streptococcus/Granulicatella  ID following  On cefepime and flagyl    Diabetes mellitus type 2 with peripheral artery disease [facility])  Assessment & Plan  Lab Results   Component Value Date    HGBA1C 6.6 (H) 07/07/2022       Recent Labs     08/23/23  2103 08/24/23  0719 08/24/23  1130 08/24/23  1712   POCGLU 138 118 148* 211*       Blood Sugar Average: Last 72 hrs:  (P) 696.1606715747598971    Status post fall  Assessment & Plan  Patient sustained a fall about 2 days ago  Etiology not clear-EKG was unremarkable. Blood sugar was 157. Questionable hypoglycemia versus accidental fall. Patient not exhibiting any signs of stroke. Telemetry showed no evidence of arrhythmia  Patient does not remember how he fell. Patient was found on the floor and could not get up. We will get PT/OT evaluation and treatment  CAT scan of the brain, CT chest abdomen pelvis, cervical spine and lumbar reconstructions study was ordered in the ED but patient refused the studies  Patient got CT of the head and neck after receiving Ativan which was unremarkable.     Hypertension  Assessment & Plan  Continue Procardia 30 mg p.o. twice daily, Coreg 12.5 mg p.o. twice daily    Type 2 diabetes mellitus, without long-term current use of insulin [facility])  Assessment & Plan  Lab Results   Component Value Date    HGBA1C 6.6 (H) 07/07/2022       Recent Labs     08/22/23  2121 08/23/23  0745 08/23/23  1110 08/23/23  1624   POCGLU 186* 241* 194* 183*       Blood Sugar Average: Last 72 hrs:  (P) [id number]   Patient is on linagliptin at home. Continue Humalog sliding scale with Accu-Cheks before every meal and at bedtime. Patient has been a diabetic diet  lantus 15 lispro 5 units     ESRD (end stage renal disease) [facility])  Assessment & Plan  Lab Results   Component Value Date    EGFR 5 08/23/2023    EGFR 4 08/22/2023    EGFR 6 08/21/2023    CREATININE 9.26 (H) 08/23/2023    CREATININE 10.01 (H) 08/22/2023    CREATININE 7.50 (H) 08/21/2023   Patient is on Tuesday Thursday Saturday schedule at [address]    Temporary HD cath removed IR to replace tomorrow    Low back pain-resolved as of 8/24/2023  Assessment & Plan  Patient has history of compression fracture of the T10  Currently complaining of severe lower back pain but refusing CAT scan of the lumbar spine or any imaging  Pain control with Lidoderm, Tylenol, oxycodone and Dilaudid for breakthrough pain  PT/OT evaluation and treatment             VTE Pharmacologic Prophylaxis: VTE Score: 2 Moderate Risk (Score 3-4) - Pharmacological DVT Prophylaxis Ordered: heparin. Patient Centered Rounds: I performed bedside rounds with nursing staff today. Discussions with Specialists or Other Care Team Provider: nephro    Education and Discussions with Family / Patient: Updated  (friend) via phone. Total Time Spent on Date of Encounter in care of patient: 45 minutes This time was spent on one or more of the following: performing physical exam; counseling and coordination of care; obtaining or reviewing history; documenting in the medical record; reviewing/ordering tests, medications or procedures; communicating with other healthcare professionals and discussing with patient's family/caregivers.     Current Length of Stay: 5 day(s)  Current Patient Status: Inpatient   Certification Statement: The patient will continue to require additional inpatient hospital stay due to pending cultures, on going medical care  Discharge Plan: Anticipate discharge in 48-72 hrs to discharge location to be determined pending rehab evaluations. Code Status: Level 1 - Full Code    Subjective:   Patient seen examined at bedside. States he's feeling much better. No acute complaints. Objective:     Vitals:   Temp (24hrs), Av.2 °F (36.8 °C), Min:97.7 °F (36.5 °C), Max:99 °F (37.2 °C)    Temp:  [97.7 °F (36.5 °C)-99 °F (37.2 °C)] 97.7 °F (36.5 °C)  HR:  [64-71] 71  Resp:  [16-20] 18  BP: (137-157)/(49-69) 140/56  SpO2:  [92 %-97 %] 97 %  Body mass index is 33.38 kg/m². Input and Output Summary (last 24 hours):   No intake or output data in the 24 hours ending 23    Physical Exam:   Physical Exam  Vitals and nursing note reviewed. Constitutional:       General: He is not in acute distress. Appearance: He is well-developed. He is obese. He is ill-appearing. HENT:      Head: Normocephalic and atraumatic. Eyes:      Conjunctiva/sclera: Conjunctivae normal.   Cardiovascular:      Rate and Rhythm: Normal rate and regular rhythm. Heart sounds: No murmur heard. Pulmonary:      Effort: Pulmonary effort is normal. No respiratory distress. Breath sounds: Normal breath sounds. Abdominal:      Palpations: Abdomen is soft. Tenderness: There is no abdominal tenderness. Musculoskeletal:         General: No swelling. Cervical back: Neck supple. Comments: Temporary hd cath right subclavian    Skin:     General: Skin is warm and dry. Capillary Refill: Capillary refill takes less than 2 seconds. Neurological:      Mental Status: He is alert. He is disoriented.    Psychiatric:         Mood and Affect: Mood normal.          Additional Data:     Labs:  Results from last 7 days   Lab Units 23  0414 23  0516   WBC Thousand/uL 8.85 8.06   HEMOGLOBIN g/dL 10.1* 11.6*   HEMATOCRIT % 31.8* 36.2*   PLATELETS Thousands/uL 219 230   NEUTROS PCT %  --  74   LYMPHS PCT %  --  12*   MONOS PCT % --  10   EOS PCT %  --  2     Results from last 7 days   Lab Units 08/23/23  0414 08/20/23  0531 08/19/23  1053   SODIUM mmol/L 133*   < > 134*   POTASSIUM mmol/L 4.3   < > 4.1   CHLORIDE mmol/L 94*   < > 94*   CO2 mmol/L 28   < > 32   BUN mg/dL 62*   < > 38*   CREATININE mg/dL 9.26*   < > 7.48*   ANION GAP mmol/L 11   < > 8   CALCIUM mg/dL 8.4   < > 10.2   ALBUMIN g/dL 3.3*  --  3.7   TOTAL BILIRUBIN mg/dL  --   --  0.68   ALK PHOS U/L  --   --  69   ALT U/L  --   --  14   AST U/L  --   --  35   GLUCOSE RANDOM mg/dL 166*   < > 136    < > = values in this interval not displayed. Results from last 7 days   Lab Units 08/19/23  1053   INR  1.16     Results from last 7 days   Lab Units 08/24/23  1712 08/24/23  1130 08/24/23  0719 08/23/23  2103 08/23/23  1624 08/23/23  1110 08/23/23  0745 08/22/23  2121 08/22/23  1626 08/22/23  1129 08/22/23  0718 08/21/23  2116   POC GLUCOSE mg/dl 211* 148* 118 138 183* 194* 241* 186* 142* 125 172* 160*         Results from last 7 days   Lab Units 08/22/23  1053   PROCALCITONIN ng/ml 112.62*       Lines/Drains:  Invasive Devices     Peripheral Intravenous Line  Duration           Peripheral IV 08/23/23 Left;Ventral (anterior) Forearm 1 day                      Imaging: Reviewed radiology reports from this admission including: MRI brain and MRI foot    Recent Cultures (last 7 days):   Results from last 7 days   Lab Units 08/24/23  0618 08/21/23  1756 08/21/23  1627 08/21/23  1423   BLOOD CULTURE  Received in Microbiology Lab. Culture in Progress. Received in Microbiology Lab. Culture in Progress. --  Enterobacter cloacae*  Granulicatella adiacens*  Proteus vulgaris group* No Growth at 72 hrs.    GRAM STAIN RESULT   --  1+ Polys*  1+ Gram positive cocci in pairs*  1+ Gram negative rods* Gram negative rods*  Gram positive cocci in chains*  --    WOUND CULTURE   --  1+ Growth of Pseudomonas aeruginosa*  2+ Growth of Enterobacter cloacae*  2+ Growth of  --   --        Last 24 Hours Medication List:   Current Facility-Administered Medications   Medication Dose Route Frequency Provider Last Rate   • acetaminophen  650 mg Oral Q6H PRN Lori Tovar MD     • acetaminophen  975 mg Oral Q8H Lori Tovar MD     • atorvastatin  80 mg Oral Daily Lori Tovar MD     • carvedilol  12.5 mg Oral BID Lori Tovar MD     • cefepime  1,000 mg Intravenous Q24H Jaren Tomario, DO 1,000 mg (08/24/23 1418)   • famotidine  10 mg Oral Daily Lori Tovar MD     • gabapentin  200 mg Oral BID Jaren Tomistye, DO     • haloperidol lactate  2 mg Intramuscular Q6H PRN Glenys Tomario, DO     • heparin (porcine)  5,000 Units Subcutaneous Q8H [facility] Lori Tovar MD     • HYDROmorphone  1 mg Intravenous Q4H PRN Lori Tovar MD     • insulin glargine  15 Units Subcutaneous HS Glenys Tomario, DO     • insulin lispro  1-5 Units Subcutaneous HS Lori Tovar MD     • insulin lispro  2-12 Units Subcutaneous TID AC Lori Tovar MD     • insulin lispro  5 Units Subcutaneous TID With Meals Jaren Tomario, DO     • lidocaine  1 patch Topical Daily Lori Tovar MD     • LORazepam  0.5 mg Intravenous Q6H PRN Lori Tovar MD     • methocarbamol  500 mg Oral Q12H PRN Lori Tovar MD     • metroNIDAZOLE  500 mg Oral Q8H [facility] Glenys Tomario, DO     • NIFEdipine  30 mg Oral BID Lori Tovar MD     • ondansetron  4 mg Intravenous Q6H PRN Lori Tovar MD     • oxyCODONE  5 mg Oral Q8H PRN Lori Tovar MD     • senna-docusate sodium  2 tablet Oral BID Jaren Tomario, DO     • sevelamer  800 mg Oral TID With Meals Lori Tovar MD          Today, Patient Was Seen By: Emma Porras DO    **Please Note: This note may have been constructed using a voice recognition system. **

## 2023-08-24 NOTE — PROGRESS NOTES
Patient seen at bedside. This morning he is more lucid. At this time he is undergoing echocardiogram.    MAR reviewed. There is question of septic arthritis and probable osteomyelitis of right foot. We will attempt bedside incision and drainage of joint. Return later this day. Awaiting arterial Doppler test to ascertain level of circulation/perfusion to right foot. Pending results of testing, patient may need vascular surgery consultation. Patient may be candidate for ray resection however we need to find out patient's mental status and if he or someone else would be power of  in order to sign any consent form indicated.

## 2023-08-25 ENCOUNTER — APPOINTMENT (OUTPATIENT)
Dept: NON INVASIVE DIAGNOSTICS | Facility: HOSPITAL | Age: 63
DRG: 474 | End: 2023-08-25
Attending: RADIOLOGY
Payer: MEDICARE

## 2023-08-25 ENCOUNTER — APPOINTMENT (INPATIENT)
Dept: DIALYSIS | Facility: HOSPITAL | Age: 63
DRG: 474 | End: 2023-08-25
Attending: STUDENT IN AN ORGANIZED HEALTH CARE EDUCATION/TRAINING PROGRAM
Payer: MEDICARE

## 2023-08-25 LAB
ALBUMIN SERPL BCP-MCNC: 3.2 G/DL (ref 3.5–5)
ANION GAP SERPL CALCULATED.3IONS-SCNC: 13 MMOL/L
AORTIC ROOT: 3.2 CM
APICAL FOUR CHAMBER EJECTION FRACTION: 54 %
AV LVOT PEAK GRADIENT: 4 MMHG
AV PEAK GRADIENT: 12 MMHG
BUN SERPL-MCNC: 60 MG/DL (ref 5–25)
CALCIUM ALBUM COR SERPL-MCNC: 8.6 MG/DL (ref 8.3–10.1)
CALCIUM SERPL-MCNC: 8 MG/DL (ref 8.4–10.2)
CHLORIDE SERPL-SCNC: 93 MMOL/L (ref 96–108)
CO2 SERPL-SCNC: 22 MMOL/L (ref 21–32)
CREAT SERPL-MCNC: 8.56 MG/DL (ref 0.6–1.3)
E WAVE DECELERATION TIME: 253 MS
ERYTHROCYTE [DISTWIDTH] IN BLOOD BY AUTOMATED COUNT: 16.6 % (ref 11.6–15.1)
FRACTIONAL SHORTENING: 35 (ref 28–44)
GFR SERPL CREATININE-BSD FRML MDRD: 5 ML/MIN/1.73SQ M
GLUCOSE SERPL-MCNC: 114 MG/DL (ref 65–140)
GLUCOSE SERPL-MCNC: 117 MG/DL (ref 65–140)
GLUCOSE SERPL-MCNC: 137 MG/DL (ref 65–140)
GLUCOSE SERPL-MCNC: 150 MG/DL (ref 65–140)
GLUCOSE SERPL-MCNC: 181 MG/DL (ref 65–140)
HCT VFR BLD AUTO: 32.1 % (ref 36.5–49.3)
HGB BLD-MCNC: 10 G/DL (ref 12–17)
INTERVENTRICULAR SEPTUM IN DIASTOLE (PARASTERNAL SHORT AXIS VIEW): 1 CM
INTERVENTRICULAR SEPTUM: 1 CM (ref 0.6–1.1)
LAAS-AP2: 29.1 CM2
LAAS-AP4: 26.5 CM2
LEFT ATRIUM SIZE: 4.9 CM
LEFT ATRIUM VOLUME (MOD BIPLANE): 94 ML
LEFT INTERNAL DIMENSION IN SYSTOLE: 3.2 CM (ref 2.1–4)
LEFT VENTRICULAR INTERNAL DIMENSION IN DIASTOLE: 4.9 CM (ref 3.5–6)
LEFT VENTRICULAR POSTERIOR WALL IN END DIASTOLE: 1.4 CM
LEFT VENTRICULAR STROKE VOLUME: 72 ML
LVSV (TEICH): 72 ML
MAGNESIUM SERPL-MCNC: 2.4 MG/DL (ref 1.9–2.7)
MCH RBC QN AUTO: 28.4 PG (ref 26.8–34.3)
MCHC RBC AUTO-ENTMCNC: 31.2 G/DL (ref 31.4–37.4)
MCV RBC AUTO: 91 FL (ref 82–98)
MRSA NOSE QL CULT: NORMAL
MV E'TISSUE VEL-SEP: 8 CM/S
MV PEAK A VEL: 0.56 M/S
MV PEAK E VEL: 77 CM/S
MV STENOSIS PRESSURE HALF TIME: 73 MS
MV VALVE AREA P 1/2 METHOD: 3.01
PHOSPHATE SERPL-MCNC: 2.5 MG/DL (ref 2.3–4.1)
PLATELET # BLD AUTO: 264 THOUSANDS/UL (ref 149–390)
PMV BLD AUTO: 10.5 FL (ref 8.9–12.7)
POTASSIUM SERPL-SCNC: 3.9 MMOL/L (ref 3.5–5.3)
RBC # BLD AUTO: 3.52 MILLION/UL (ref 3.88–5.62)
RIGHT ATRIUM AREA SYSTOLE A4C: 21.4 CM2
RIGHT VENTRICLE ID DIMENSION: 4 CM
SL CV LEFT ATRIUM LENGTH A2C: 6.6 CM
SL CV LV EF: 55
SL CV PED ECHO LEFT VENTRICLE DIASTOLIC VOLUME (MOD BIPLANE) 2D: 114 ML
SL CV PED ECHO LEFT VENTRICLE SYSTOLIC VOLUME (MOD BIPLANE) 2D: 42 ML
SODIUM SERPL-SCNC: 128 MMOL/L (ref 135–147)
TR MAX PG: 22 MMHG
TR PEAK VELOCITY: 2.4 M/S
TRICUSPID ANNULAR PLANE SYSTOLIC EXCURSION: 2.5 CM
TRICUSPID VALVE PEAK REGURGITATION VELOCITY: 2.35 M/S
WBC # BLD AUTO: 9.96 THOUSAND/UL (ref 4.31–10.16)

## 2023-08-25 PROCEDURE — 85027 COMPLETE CBC AUTOMATED: CPT | Performed by: STUDENT IN AN ORGANIZED HEALTH CARE EDUCATION/TRAINING PROGRAM

## 2023-08-25 PROCEDURE — 99232 SBSQ HOSP IP/OBS MODERATE 35: CPT | Performed by: STUDENT IN AN ORGANIZED HEALTH CARE EDUCATION/TRAINING PROGRAM

## 2023-08-25 PROCEDURE — 93306 TTE W/DOPPLER COMPLETE: CPT | Performed by: INTERNAL MEDICINE

## 2023-08-25 PROCEDURE — 99232 SBSQ HOSP IP/OBS MODERATE 35: CPT | Performed by: PODIATRIST

## 2023-08-25 PROCEDURE — 83735 ASSAY OF MAGNESIUM: CPT | Performed by: STUDENT IN AN ORGANIZED HEALTH CARE EDUCATION/TRAINING PROGRAM

## 2023-08-25 PROCEDURE — C1894 INTRO/SHEATH, NON-LASER: HCPCS

## 2023-08-25 PROCEDURE — C1752 CATH,HEMODIALYSIS,SHORT-TERM: HCPCS

## 2023-08-25 PROCEDURE — 36556 INSERT NON-TUNNEL CV CATH: CPT

## 2023-08-25 PROCEDURE — 05HY33Z INSERTION OF INFUSION DEVICE INTO UPPER VEIN, PERCUTANEOUS APPROACH: ICD-10-PCS | Performed by: INTERNAL MEDICINE

## 2023-08-25 PROCEDURE — 77001 FLUOROGUIDE FOR VEIN DEVICE: CPT | Performed by: INTERNAL MEDICINE

## 2023-08-25 PROCEDURE — 76937 US GUIDE VASCULAR ACCESS: CPT | Performed by: INTERNAL MEDICINE

## 2023-08-25 PROCEDURE — 80069 RENAL FUNCTION PANEL: CPT | Performed by: STUDENT IN AN ORGANIZED HEALTH CARE EDUCATION/TRAINING PROGRAM

## 2023-08-25 PROCEDURE — 77001 FLUOROGUIDE FOR VEIN DEVICE: CPT

## 2023-08-25 PROCEDURE — 99232 SBSQ HOSP IP/OBS MODERATE 35: CPT | Performed by: NURSE PRACTITIONER

## 2023-08-25 PROCEDURE — 36556 INSERT NON-TUNNEL CV CATH: CPT | Performed by: INTERNAL MEDICINE

## 2023-08-25 PROCEDURE — 82948 REAGENT STRIP/BLOOD GLUCOSE: CPT

## 2023-08-25 PROCEDURE — 76937 US GUIDE VASCULAR ACCESS: CPT

## 2023-08-25 PROCEDURE — 99232 SBSQ HOSP IP/OBS MODERATE 35: CPT | Performed by: INTERNAL MEDICINE

## 2023-08-25 RX ORDER — LIDOCAINE HYDROCHLORIDE 10 MG/ML
INJECTION, SOLUTION EPIDURAL; INFILTRATION; INTRACAUDAL; PERINEURAL AS NEEDED
Status: COMPLETED | OUTPATIENT
Start: 2023-08-25 | End: 2023-08-25

## 2023-08-25 RX ADMIN — SENNOSIDES AND DOCUSATE SODIUM 2 TABLET: 50; 8.6 TABLET ORAL at 08:35

## 2023-08-25 RX ADMIN — ATORVASTATIN CALCIUM 80 MG: 80 TABLET, FILM COATED ORAL at 08:35

## 2023-08-25 RX ADMIN — HEPARIN SODIUM 5000 UNITS: 5000 INJECTION INTRAVENOUS; SUBCUTANEOUS at 22:21

## 2023-08-25 RX ADMIN — METRONIDAZOLE 500 MG: 500 TABLET ORAL at 10:50

## 2023-08-25 RX ADMIN — METHOCARBAMOL 500 MG: 500 TABLET ORAL at 16:59

## 2023-08-25 RX ADMIN — SEVELAMER HYDROCHLORIDE 800 MG: 800 TABLET ORAL at 16:59

## 2023-08-25 RX ADMIN — GABAPENTIN 200 MG: 250 SUSPENSION ORAL at 17:07

## 2023-08-25 RX ADMIN — HEPARIN SODIUM 5000 UNITS: 5000 INJECTION INTRAVENOUS; SUBCUTANEOUS at 05:26

## 2023-08-25 RX ADMIN — LIDOCAINE HYDROCHLORIDE 5 ML: 10 INJECTION, SOLUTION EPIDURAL; INFILTRATION; INTRACAUDAL; PERINEURAL at 13:20

## 2023-08-25 RX ADMIN — INSULIN GLARGINE 15 UNITS: 100 INJECTION, SOLUTION SUBCUTANEOUS at 22:21

## 2023-08-25 RX ADMIN — CEFEPIME HYDROCHLORIDE 1000 MG: 1 INJECTION, SOLUTION INTRAVENOUS at 19:18

## 2023-08-25 RX ADMIN — NIFEDIPINE 30 MG: 30 TABLET, EXTENDED RELEASE ORAL at 08:35

## 2023-08-25 RX ADMIN — METRONIDAZOLE 500 MG: 500 TABLET ORAL at 17:30

## 2023-08-25 RX ADMIN — INSULIN LISPRO 1 UNITS: 100 INJECTION, SOLUTION INTRAVENOUS; SUBCUTANEOUS at 22:21

## 2023-08-25 RX ADMIN — OXYCODONE HYDROCHLORIDE 5 MG: 5 TABLET ORAL at 14:21

## 2023-08-25 RX ADMIN — INSULIN LISPRO 5 UNITS: 100 INJECTION, SOLUTION INTRAVENOUS; SUBCUTANEOUS at 17:02

## 2023-08-25 RX ADMIN — METRONIDAZOLE 500 MG: 500 TABLET ORAL at 03:09

## 2023-08-25 RX ADMIN — SENNOSIDES AND DOCUSATE SODIUM 2 TABLET: 50; 8.6 TABLET ORAL at 17:00

## 2023-08-25 RX ADMIN — FAMOTIDINE 10 MG: 20 TABLET, FILM COATED ORAL at 08:35

## 2023-08-25 RX ADMIN — HYDROMORPHONE HYDROCHLORIDE 1 MG: 1 INJECTION, SOLUTION INTRAMUSCULAR; INTRAVENOUS; SUBCUTANEOUS at 23:37

## 2023-08-25 RX ADMIN — INSULIN LISPRO 2 UNITS: 100 INJECTION, SOLUTION INTRAVENOUS; SUBCUTANEOUS at 16:59

## 2023-08-25 RX ADMIN — CARVEDILOL 12.5 MG: 12.5 TABLET, FILM COATED ORAL at 08:35

## 2023-08-25 RX ADMIN — HEPARIN SODIUM 5000 UNITS: 5000 INJECTION INTRAVENOUS; SUBCUTANEOUS at 14:40

## 2023-08-25 RX ADMIN — OXYCODONE HYDROCHLORIDE 5 MG: 5 TABLET ORAL at 22:21

## 2023-08-25 RX ADMIN — LIDOCAINE 5% 1 PATCH: 700 PATCH TOPICAL at 08:34

## 2023-08-25 NOTE — PLAN OF CARE
UF goal was not met d/t UF switched off at the last hour of tx in response to patient's report of cramping at his right hand. Post-Dialysis RN Treatment Note    Blood Pressure:  Pre 124/56 mm/Hg  Post 160/77 mmHg   EDW  TBD kg    Weight:  Pre 108.7 kg   Post 106.5 kg   Mode of weight measurement: Bed Scale   Volume Removed  2,065 ml    Treatment duration 210 minutes    NS given  No    Treatment shortened? No   Medications given during Rx None Reported   Estimated Kt/V  Not Applicable   Access type: Temporary HD catheter   Access Issues: Yes, describe: lines were reversed d/t inability to continue tx without machine alarms at . Report called to primary nurse   Yes, via [employer] to Chey Sneed. RN      Started patient on hemodialysis treatment with UF goal of 2.5L net as tolerated per HD order x 3.5 hours x 4K bath for serum k+ of 3.9 today 8/25/23.     Problem: METABOLIC, FLUID AND ELECTROLYTES - ADULT  Goal: Electrolytes maintained within normal limits  Description: INTERVENTIONS:  - Monitor labs and assess patient for signs and symptoms of electrolyte imbalances  - Administer electrolyte replacement as ordered  - Monitor response to electrolyte replacements, including repeat lab results as appropriate  - Instruct patient on fluid and nutrition as appropriate  Outcome: Progressing  Goal: Fluid balance maintained  Description: INTERVENTIONS:  - Monitor labs   - Monitor I/O and WT  - Instruct patient on fluid and nutrition as appropriate  - Assess for signs & symptoms of volume excess or deficit  Outcome: Progressing

## 2023-08-25 NOTE — PROGRESS NOTES
[address]  Progress Note  Name: Romero Bright  MRN: [id number]  Unit/Bed#: 913 [facility] 421-01 I Date of Admission: 8/19/2023   Date of Service: 8/25/2023 I Hospital Day: 6    Assessment/Plan   * Altered mental status  Assessment & Plan  Patient had been confused and more agitated and not following commands and refusing all treatment previous day; at baseline currently   UDS negative   Neurology consult pending at the current time-based on further course might need to consider MRI of the brain 2. Patient has known history of TIAs  CT of the head and neck with no acute intracranial abnormality with tiny hypodensity in the right posterior inferior putamen likely chronic lacunar infarct dilated prevascular space. Negative CTA for large vessel occlusion dissection or aneurysm with possible tracheobronchomalacia  Discussed with patient's friend-no history of alcohol use or drug use  Ammonia and TSH level were normal  Psychiatry was consulted-recommending Haldol 2 mg p.o. every 4 hours as needed for agitation at 2 mg IM as needed for severe agitation. Started septic work-up also as patient noted to have deep left foot wound-patient has been afebrile and had white count initially which has since normalized. Suspect most symptoms 2/2 infection see below  B12, folate, TSH, ammonia WNL  Mentation appears to be improved; at baseline     Diabetic ulcer of left midfoot associated with type 2 diabetes mellitus, limited to breakdown of skin [facility])  Assessment & Plan  Lab Results   Component Value Date    HGBA1C 6.6 (H) 07/07/2022       Recent Labs     08/22/23  2121 08/23/23  0745 08/23/23  1110 08/23/23  1624   POCGLU 186* 241* 194* 183*       Blood Sugar Average: Last 72 hrs:  (P) [id number]     Podiatry consulted for Delgadillo grade 1 ulcer submetatarsal 1 left foot.    S/P Debridement  Noted to have elevated CRP, procalcitonin,  1 of 2 blood cultures growing G- rods and G+ cocci in chains  8/22 Started vanc cefepime and flagyl  8/23 ID switched abx to cefepime and flagyl, will continue  F/U final blood and wound cultures  MRI foot First submetatarsal head plantar skin ulceration with suspected draining sinus tract extending to the first metatarsal phalangeal joint suspicious for septic arthropathy  8/24 s/p I&D by podiatry  Vascular consulted as imaging showing lower limb diffuse atherosclerotic disease noted throughout with elevated PSV of proximal popliteal artery and tibial peroneal trunk suggesting less than 50% stenosis, metatarsal pressure greater than 200, great toe pressure 95 mmHg within the healing range on right side. Left lower limb showed diffuse atherosclerotic disease noted throughout with a 50 to 75% stenosis of the proximal popliteal artery, metatarsal pressure 170, great toe pressure 98 mmHg within healing range. Follow-up on recommendations      Status post fall  Assessment & Plan  Patient sustained a fall about 2 days ago  Etiology not clear-EKG was unremarkable. Blood sugar was 157. Questionable hypoglycemia versus accidental fall. Patient not exhibiting any signs of stroke. Telemetry showed no evidence of arrhythmia  Patient does not remember how he fell. Patient was found on the floor and could not get up. We will get PT/OT evaluation and treatment  CAT scan of the brain, CT chest abdomen pelvis, cervical spine and lumbar reconstructions study was ordered in the ED but patient refused the studies  Patient got CT of the head and neck after receiving Ativan which was unremarkable.   Fall precautions    ESRD (end stage renal disease) [facility])  Assessment & Plan  Lab Results   Component Value Date    EGFR 5 08/23/2023    EGFR 4 08/22/2023    EGFR 6 08/21/2023    CREATININE 9.26 (H) 08/23/2023    CREATININE 10.01 (H) 08/22/2023    CREATININE 7.50 (H) 08/21/2023   Patient is on Tuesday Thursday Saturday schedule at [facility]    Temporary HD cath removed IR, temporary catheter placed 8/25  Neurology following, plan for dialysis in a.m. Type 2 diabetes mellitus, without long-term current use of insulin [facility])  Assessment & Plan  Lab Results   Component Value Date    HGBA1C 6.6 (H) 07/07/2022       Recent Labs     08/22/23  2121 08/23/23  0745 08/23/23  1110 08/23/23  1624   POCGLU 186* 241* 194* 183*       Blood Sugar Average: Last 72 hrs:  (P) 307.1964426296300809   Patient is on linagliptin at home. Continue Humalog sliding scale with Accu-Cheks before every meal and at bedtime.   Patient has been a diabetic diet  lantus 15 lispro 5 units     Hypertension  Assessment & Plan  Continue Procardia 30 mg p.o. twice daily, Coreg 12.5 mg p.o. twice daily    Polymicrobial bacterial infection  Assessment & Plan  Polymicrobial bacteremia.  Enterobacter/Proteus/Streptococcus/Granulicatella  ID following  On cefepime and flagyl    Subacute osteomyelitis of right foot [facility])  Assessment & Plan  Podiatry following  Vascular consulted, see plan as noted above    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 mellitus, with necrosis of bone [facility])  Assessment & Plan  Lab Results   Component Value Date    HGBA1C 6.6 (H) 07/07/2022       Recent Labs     08/24/23  2054 08/25/23  0731 08/25/23  1127 08/25/23  1646   POCGLU 150* 114 137 150*       Blood Sugar Average: Last 72 hrs:  (P) 160.6    Diabetes mellitus type 2 with peripheral artery disease [facility])  Assessment & Plan  Lab Results   Component Value Date    HGBA1C 6.6 (H) 07/07/2022       Recent Labs     08/23/23  2103 08/24/23  0719 08/24/23  1130 08/24/23  1712   POCGLU 138 118 148* 211*       Blood Sugar Average: Last 72 hrs:  (P) 613.4369358662696026    Low back pain-resolved as of 8/24/2023  Assessment & Plan  Patient has history of compression fracture of the T10  Currently complaining of severe lower back pain but refusing CAT scan of the lumbar spine or any imaging  Pain control with Lidoderm, Tylenol, oxycodone and Dilaudid for breakthrough pain  PT/OT evaluation and treatment               VTE Pharmacologic Prophylaxis: VTE Score: 2 Moderate Risk (Score 3-4) - Pharmacological DVT Prophylaxis Ordered: heparin. Patient Centered Rounds: I performed bedside rounds with nursing staff today. Discussions with Specialists or Other Care Team Provider: multidisciplinary team    Education and Discussions with Family / Patient: patient indicated he would update daughter . Total Time Spent on Date of Encounter in care of patient: 45 minutes This time was spent on one or more of the following: performing physical exam; counseling and coordination of care; obtaining or reviewing history; documenting in the medical record; reviewing/ordering tests, medications or procedures; communicating with other healthcare professionals and discussing with patient's family/caregivers. Current Length of Stay: 6 day(s)  Current Patient Status: Inpatient   Certification Statement: The patient will continue to require additional inpatient hospital stay due to IV abx, vascular consult, podiatry consult, nephro and dialysis   Discharge Plan: Anticipate discharge in >72 hrs to discharge location to be determined pending rehab evaluations. Code Status: Level 1 - Full Code    Subjective:   Patient seen sitting up on side of bed, resting comfortably. Has questions about the next steps in his plan of care, time spent explaining next steps. He realizes that he is n.p.o. for planned dialysis catheter insertion this afternoon with interventional radiology. Denies any pain at this time. Feels his mentation is back at baseline. Objective:     Vitals:   Temp (24hrs), Av.1 °F (36.7 °C), Min:97.6 °F (36.4 °C), Max:99.3 °F (37.4 °C)    Temp:  [97.6 °F (36.4 °C)-99.3 °F (37.4 °C)] 97.6 °F (36.4 °C)  HR:  [57-73] 67  Resp:  [15-20] 15  BP: (111-149)/(43-67) 112/53  SpO2:  [91 %-100 %] 98 %  Body mass index is 33.38 kg/m².      Input and Output Summary (last 24 hours): Intake/Output Summary (Last 24 hours) at 8/25/2023 1705  Last data filed at 8/25/2023 1601  Gross per 24 hour   Intake 600 ml   Output 300 ml   Net 300 ml       Physical Exam:   Physical Exam  Vitals and nursing note reviewed. HENT:      Head: Normocephalic. Nose: Nose normal.      Mouth/Throat:      Mouth: Mucous membranes are moist.   Eyes:      Extraocular Movements: Extraocular movements intact. Conjunctiva/sclera: Conjunctivae normal.   Cardiovascular:      Rate and Rhythm: Normal rate and regular rhythm. Pulses: Normal pulses. Pulmonary:      Effort: Pulmonary effort is normal.      Breath sounds: Normal breath sounds. Abdominal:      General: Bowel sounds are normal. There is no distension. Palpations: Abdomen is soft. Tenderness: There is no abdominal tenderness. Musculoskeletal:      Cervical back: Normal range of motion. Comments: Right foot dressing noted; no strike through  Drainage noted. Skin:     General: Skin is warm and dry. Capillary Refill: Capillary refill takes less than 2 seconds. Comments: Right foot dressing    Neurological:      General: No focal deficit present. Mental Status: He is alert and oriented to person, place, and time. Psychiatric:         Mood and Affect: Mood normal.         Behavior: Behavior normal.         Thought Content: Thought content normal.         Judgment: Judgment normal.          Additional Data:     Labs:  Results from last 7 days   Lab Units 08/25/23  0438 08/23/23  0414 08/22/23  0516   WBC Thousand/uL 9.96   < > 8.06   HEMOGLOBIN g/dL 10.0*   < > 11.6*   HEMATOCRIT % 32.1*   < > 36.2*   PLATELETS Thousands/uL 264   < > 230   NEUTROS PCT %  --   --  74   LYMPHS PCT %  --   --  12*   MONOS PCT %  --   --  10   EOS PCT %  --   --  2    < > = values in this interval not displayed.      Results from last 7 days   Lab Units 08/25/23  0438 08/20/23  0531 08/19/23  1053   SODIUM mmol/L 128* < > 134*   POTASSIUM mmol/L 3.9   < > 4.1   CHLORIDE mmol/L 93*   < > 94*   CO2 mmol/L 22   < > 32   BUN mg/dL 60*   < > 38*   CREATININE mg/dL 8.56*   < > 7.48*   ANION GAP mmol/L 13   < > 8   CALCIUM mg/dL 8.0*   < > 10.2   ALBUMIN g/dL 3.2*   < > 3.7   TOTAL BILIRUBIN mg/dL  --   --  0.68   ALK PHOS U/L  --   --  69   ALT U/L  --   --  14   AST U/L  --   --  35   GLUCOSE RANDOM mg/dL 117   < > 136    < > = values in this interval not displayed. Results from last 7 days   Lab Units 08/19/23  1053   INR  1.16     Results from last 7 days   Lab Units 08/25/23  1646 08/25/23  1127 08/25/23  0731 08/24/23  2054 08/24/23  1712 08/24/23  1130 08/24/23  0719 08/23/23  2103 08/23/23  1624 08/23/23  1110 08/23/23  0745 08/22/23  2121   POC GLUCOSE mg/dl 150* 137 114 150* 211* 148* 118 138 183* 194* 241* 186*         Results from last 7 days   Lab Units 08/22/23  1053   PROCALCITONIN ng/ml 112.62*       Lines/Drains:  Invasive Devices     Peripheral Intravenous Line  Duration           Peripheral IV 08/23/23 Left;Ventral (anterior) Forearm 2 days          Hemodialysis Catheter  Duration           HD Temporary Double Catheter <1 day                      Imaging: Reviewed radiology reports from this admission including: chest xray    Recent Cultures (last 7 days):   Results from last 7 days   Lab Units 08/24/23  1748 08/24/23  0618 08/21/23  1756 08/21/23  1627 08/21/23  1423   BLOOD CULTURE   --  No Growth at 24 hrs. No Growth at 24 hrs.  --  Enterobacter cloacae*  Granulicatella adiacens*  Proteus vulgaris*  Bacteroides fragilis* No Growth After 4 Days.    GRAM STAIN RESULT  1+ Polys*  1+ Gram positive cocci in pairs*  Rare Polys  No bacteria seen  --  1+ Polys*  1+ Gram positive cocci in pairs*  1+ Gram negative rods* Gram negative rods*  Gram positive cocci in chains*  --    WOUND CULTURE  Culture too young- will reincubate  --  1+ Growth of Pseudomonas aeruginosa*  2+ Growth of Enterobacter cloacae*  2+ Growth of  --   --        Last 24 Hours Medication List:   Current Facility-Administered Medications   Medication Dose Route Frequency Provider Last Rate   • acetaminophen  650 mg Oral Q6H PRN Katerina Teixeira MD     • acetaminophen  975 mg Oral Q8H Katerina Teixeira MD     • atorvastatin  80 mg Oral Daily Katerina Teixeira MD     • carvedilol  12.5 mg Oral BID Katerina Teixeira MD     • cefepime  1,000 mg Intravenous Q24H Jaren Tomario, DO 1,000 mg (08/24/23 1418)   • famotidine  10 mg Oral Daily Katerina Teixeira MD     • gabapentin  200 mg Oral BID Jaren Méndez, DO     • haloperidol lactate  2 mg Intramuscular Q6H PRN Jaren Méndez DO     • heparin (porcine)  5,000 Units Subcutaneous Q8H [facility] Katerina Teixeira MD     • HYDROmorphone  1 mg Intravenous Q4H PRN Katerina Teixeira MD     • insulin glargine  15 Units Subcutaneous HS Jaren Méndez DO     • insulin lispro  1-5 Units Subcutaneous HS Katerina Teixeira MD     • insulin lispro  2-12 Units Subcutaneous TID AC Katerina Teixeira MD     • insulin lispro  5 Units Subcutaneous TID With Meals Jaren Méndez, DO     • lidocaine  1 patch Topical Daily Katerina Teixeira MD     • LORazepam  0.5 mg Intravenous Q6H PRN Katerina Teixeira MD     • methocarbamol  500 mg Oral Q12H PRN Katerina Teixeira MD     • metroNIDAZOLE  500 mg Oral Q8H [facility] Jaren Méndez DO     • NIFEdipine  30 mg Oral BID Katerina Teixeira MD     • ondansetron  4 mg Intravenous Q6H PRN Katerina Teixeira MD     • oxyCODONE  5 mg Oral Q8H PRN Katerina Teixeira MD     • senna-docusate sodium  2 tablet Oral BID Jaren Méndez DO     • sevelamer  800 mg Oral TID With Meals Katerina Teixeira MD          Today, Patient Was Seen By: GRANT Cheema    **Please Note: This note may have been constructed using a voice recognition system. **

## 2023-08-25 NOTE — PROGRESS NOTES
Progress Note - Infectious Disease   Georgia Harman 58 y.o. male MRN: [id number]  Unit/Bed#: [address] [place] [address] Encounter: [id number]      A/P:  1.  Polymicrobial bacteremia.  Enterobacter/Proteus/Streptococcus/Granulicatella. Secondary to #2. Doubt PermCath infection, which has been removed. 2D echo. Negative repeat blood cultures. 2.  Right diabetic foot ulceration with underlying septic arthropathy and osteomyelitis. Wound culture grew Pseudomonas, Enterobacter. Status post bedside debridement down to bone on . Bone culture was obtained. 3.  ESRD on HD via PermCath.  Status post PermCath removal.  Tip culture with growth of staph epi, likely contaminant. Blood cultures with no growth of CoNS. 4.  Acute encephalopathy.  Likely toxic metabolic.  Negative brain MRI. 5.  DM2.     Continue cefepime/Flagyl for ongoing management of bacteremia secondary to right foot infection. Follow-up wound cultures obtained during  debridement, including culture of the bone. Patient is high risk for amputation. Follow-up further recommendations by podiatry and vascular surgery. Antibiotic  Cefepime/Flagyl D4    I discussed above plan in detail with patient. We will plan to formally reevaluate patient on . Please call us with any new questions in the interim. Subjective:  Bedside excisional debridement was performed yesterday, which included a bone culture. HD catheter was placed today. No fevers, chills.     Objective:  Vitals:  Temp:  [97.6 °F (36.4 °C)-99.3 °F (37.4 °C)] 97.6 °F (36.4 °C)  HR:  [57-73] 60  Resp:  [15-20] 16  BP: (113-149)/(53-67) 130/58  SpO2:  [95 %-100 %] 98 %  Temp (24hrs), Av.1 °F (36.7 °C), Min:97.6 °F (36.4 °C), Max:99.3 °F (37.4 °C)  Current: Temperature: 97.6 °F (36.4 °C)    Physical Exam:   General:  No acute distress, nontoxic  HEENT: Atraumatic normocephalic  Neck: trachea midline  Psychiatric:  Awake and alert  Pulmonary:  Normal respiratory excursion without accessory muscle use  Abdomen:  Soft, nontender  Extremities:  No edema  Foot dressing is intact  Skin:  No rashes  Neuro: Moves all extremities spontaneously. Lab Results:  I have personally reviewed pertinent labs. Results from last 7 days   Lab Units 08/25/23  0438 08/23/23  0414 08/22/23  0516 08/20/23  0531 08/19/23  1053   POTASSIUM mmol/L 3.9 4.3 4.6   < > 4.1   CHLORIDE mmol/L 93* 94* 93*   < > 94*   CO2 mmol/L 22 28 27   < > 32   BUN mg/dL 60* 62* 64*   < > 38*   CREATININE mg/dL 8.56* 9.26* 10.01*   < > 7.48*   EGFR ml/min/1.73sq m 5 5 4   < > 6   CALCIUM mg/dL 8.0* 8.4 8.9   < > 10.2   AST U/L  --   --   --   --  35   ALT U/L  --   --   --   --  14   ALK PHOS U/L  --   --   --   --  69    < > = values in this interval not displayed. Results from last 7 days   Lab Units 08/25/23  0438 08/23/23  0414 08/22/23  0516   WBC Thousand/uL 9.96 8.85 8.06   HEMOGLOBIN g/dL 10.0* 10.1* 11.6*   PLATELETS Thousands/uL 264 219 230     Results from last 7 days   Lab Units 08/24/23  1748 08/24/23  0629 08/24/23  0618 08/21/23  1756 08/21/23  1627 08/21/23  1423 08/19/23  1759   BLOOD CULTURE   --   --  No Growth at 24 hrs.   No Growth at 24 hrs.  --  Enterobacter cloacae*  Granulicatella adiacens*  Proteus species* No Growth at 72 hrs.  --    GRAM STAIN RESULT  1+ Polys*  1+ Gram positive cocci in pairs*  Rare Polys  No bacteria seen  --   --  1+ Polys*  1+ Gram positive cocci in pairs*  1+ Gram negative rods* Gram negative rods*  Gram positive cocci in chains*  --   --    WOUND CULTURE  Culture too young- will reincubate  --   --  1+ Growth of Pseudomonas aeruginosa*  2+ Growth of Enterobacter cloacae*  2+ Growth of  --   --   --    MRSA CULTURE ONLY   --  No Methicillin Resistant Staphlyococcus aureus (MRSA) isolated  --   --   --   --  No Methicillin Resistant Staphlyococcus aureus (MRSA) isolated       Imaging Studies:   I have personally reviewed pertinent imaging study reports and images in PACS. EKG, Pathology, and Other Studies:   I have personally reviewed pertinent reports. Procedure report was personally reviewed.

## 2023-08-25 NOTE — ASSESSMENT & PLAN NOTE
Lab Results   Component Value Date    HGBA1C 6.6 (H) 07/07/2022       Recent Labs     08/24/23 2054 08/25/23  0731 08/25/23  1127 08/25/23  1646   POCGLU 150* 114 137 150*       Blood Sugar Average: Last 72 hrs:  (P) 160.6

## 2023-08-25 NOTE — PROGRESS NOTES
Arterial Doppler evaluated. Patient demonstrates atherosclerosis. There appears to be a blockage at the right lower extremity arterial trifurcation. Vascular surgery consulted for evaluation and possible intervention.

## 2023-08-25 NOTE — PROGRESS NOTES
NEPHROLOGY PROGRESS NOTE   Monica Tavarez 58 y.o. male MRN: [id number]  Unit/Bed#: [address] [place] [address] Encounter: [id number]  Reason for Consult: Management of ESRD    ASSESSMENT AND PLAN:  59 yo man with PMH of ESRD on HD TTS p/w AMS.   Nephrology is consulted for management of ESRD                PLAN:     #ESRD on HD TTS:  • Dialysis unit/days:[facility]   • Access: Temporary HD line  • Dialysis yesterday, well-tolerated to go back to schedule  • Plan for HD today  • Fluid restriction 1-1.5L/d  • Adjust medications to GFR<10  • Avoid opioids      #Volume status/hypertension:  • Volume:  Euvolemic  • Blood pressure:      Borderline hypertension, /56mmhg, goal <140/90  • low-sodium diet  • Coreg 12.5 twice daily  • Nifedipine 30 mg twice daily   • Fluid removal on HD     #Secondary Hyperparasitoidism   • On sevelamer only with meals     # Anemia of Kidney Disease  • Current hemoglobin: 10 mg/dL  • At goal  • Treatment:  • Transfuse for hemoglobin less than 7.0 per primary service          #AMS  • Possible secondary to sepsis     #Bacteremia  • PermCath removed on Wednesday, had line holiday  • Temporary HD line placed on 8/25  • PermCath to be placed next week blood cultures are negative  • ID following    SUBJECTIVE:  Patient seen and examined at bedside. Patient had dialysis yesterday, well-tolerated.   Afebrile    OBJECTIVE:  Current Weight: Weight - Scale: 115 kg (253 lb)  Vitals:    08/25/23 2217   BP: 141/56   Pulse: 67   Resp: 18   Temp: 97.8 °F (36.6 °C)   SpO2: 95%       Intake/Output Summary (Last 24 hours) at 8/26/2023 0723  Last data filed at 8/25/2023 2016  Gross per 24 hour   Intake 1270 ml   Output 3165 ml   Net -1895 ml     Wt Readings from Last 3 Encounters:   08/24/23 115 kg (253 lb)   07/31/22 102 kg (225 lb 6.4 oz)   07/06/22 106 kg (233 lb 11 oz)     Temp Readings from Last 3 Encounters:   08/25/23 97.8 °F (36.6 °C)   07/31/22 98.4 °F (36.9 °C) (Temporal)   07/13/22 (!) 97 °F (36.1 °C) (Oral) BP Readings from Last 3 Encounters:   08/25/23 141/56   07/31/22 118/60   07/14/22 139/52     Pulse Readings from Last 3 Encounters:   08/25/23 67   07/31/22 61   07/14/22 65        General:  no acute distress at this time  Skin:  No acute rash  Eyes:  No scleral icterus and noninjected  ENT:  mucous membranes moist  Neck:  no carotid bruits  Chest:  Clear to auscultation percussion, good respiratory effort, no use of accessory respiratory muscles  CVS:  Regular rate and rhythm without rub   Abdomen:  soft and nontender   Extremities: no significant lower extremity edema  Neuro:  No gross focality  Psych:  Alert , cooperative   Vascular access: Temporary HD line    Medications:    Current Facility-Administered Medications:   •  acetaminophen (TYLENOL) tablet 650 mg, 650 mg, Oral, Q6H PRN, Federico Levy MD, 650 mg at 08/20/23 1141  •  acetaminophen (TYLENOL) tablet 975 mg, 975 mg, Oral, Q8H, Joel Dalton MD, 975 mg at 08/24/23 0148  •  atorvastatin (LIPITOR) tablet 80 mg, 80 mg, Oral, Daily, Federico Levy MD, 80 mg at 08/25/23 0835  •  carvedilol (COREG) tablet 12.5 mg, 12.5 mg, Oral, BID, Federico Levy MD, 12.5 mg at 08/25/23 9922  •  cefepime (MAXIPIME) IVPB (premix in dextrose) 1,000 mg 50 mL, 1,000 mg, Intravenous, Q24H, Jaren Méndez DO, Last Rate: 100 mL/hr at 08/25/23 1918, 1,000 mg at 08/25/23 1918  •  famotidine (PEPCID) tablet 10 mg, 10 mg, Oral, Daily, Federico Levy MD, 10 mg at 08/25/23 1281  •  gabapentin (NEURONTIN) oral solution 200 mg, 200 mg, Oral, BID, Jaren Méndez DO, 200 mg at 08/25/23 1707  •  haloperidol lactate (HALDOL) injection 2 mg, 2 mg, Intramuscular, Q6H PRN, Jaren Méndez DO, 2 mg at 08/23/23 1441  •  heparin (porcine) subcutaneous injection 5,000 Units, 5,000 Units, Subcutaneous, Q8H [facility], Joel Dalton MD, 5,000 Units at 08/26/23 9294  •  HYDROmorphone (DILAUDID) injection 1 mg, 1 mg, Intravenous, Q4H PRN, Federico Levy MD, 1 mg at 08/25/23 3073  • insulin glargine (LANTUS) subcutaneous injection 15 Units 0.15 mL, 15 Units, Subcutaneous, HS, Jaren Méndez DO, 15 Units at 08/25/23 2221  •  insulin lispro (HumaLOG) 100 units/mL subcutaneous injection 1-5 Units, 1-5 Units, Subcutaneous, HS, Brionna Torres MD, 1 Units at 08/25/23 2221  •  insulin lispro (HumaLOG) 100 units/mL subcutaneous injection 2-12 Units, 2-12 Units, Subcutaneous, TID AC, 2 Units at 08/25/23 1659 **AND** Fingerstick Glucose (POCT), , , TID AC, Joel Dalton MD  •  insulin lispro (HumaLOG) 100 units/mL subcutaneous injection 5 Units, 5 Units, Subcutaneous, TID With Meals, Jaren Méndez DO, 5 Units at 08/25/23 1702  •  lidocaine (LIDODERM) 5 % patch 1 patch, 1 patch, Topical, Daily, Brionna Torres MD, 1 patch at 08/25/23 0834  •  LORazepam (ATIVAN) injection 0.5 mg, 0.5 mg, Intravenous, Q6H PRN, Brionna Torres MD, 0.5 mg at 08/22/23 1240  •  methocarbamol (ROBAXIN) tablet 500 mg, 500 mg, Oral, Q12H PRN, Brionna Torres MD, 500 mg at 08/25/23 1659  •  metroNIDAZOLE (FLAGYL) tablet 500 mg, 500 mg, Oral, Q8H [address], Jaren Méndez DO, 500 mg at 08/26/23 0208  •  NIFEdipine (PROCARDIA XL) 24 hr tablet 30 mg, 30 mg, Oral, BID, Joel Dalton MD, 30 mg at 08/25/23 0835  •  ondansetron (ZOFRAN) injection 4 mg, 4 mg, Intravenous, Q6H PRN, Brionna Torres MD  •  oxyCODONE (ROXICODONE) IR tablet 5 mg, 5 mg, Oral, Q8H PRN, Jose Angel Dalton MD, 5 mg at 08/25/23 2221  •  senna-docusate sodium (SENOKOT S) 8.6-50 mg per tablet 2 tablet, 2 tablet, Oral, BID, Jaren Méndez DO, 2 tablet at 08/25/23 1700  •  sevelamer (RENAGEL) tablet 800 mg, 800 mg, Oral, TID With Meals, Brionna Torres MD, 800 mg at 08/25/23 1659    Laboratory Results:  Results from last 7 days   Lab Units 08/25/23  0438 08/23/23  0414 08/22/23  0516 08/21/23  0546 08/20/23  0531 08/19/23  1053   WBC Thousand/uL 9.96 8.85 8.06 7.42 11.35* 14.01*   HEMOGLOBIN g/dL 10.0* 10.1* 11.6* 11.8* 11.7* 11.4*   HEMATOCRIT % 32.1* 31.8* 36.2* 37.0 37.3 35.5*   PLATELETS Thousands/uL 264 219 230 195 211 213   SODIUM mmol/L 128* 133* 133* 134* 134* 134*   POTASSIUM mmol/L 3.9 4.3 4.6 4.1 3.9 4.1   CHLORIDE mmol/L 93* 94* 93* 94* 94* 94*   CO2 mmol/L 22 28 27 28 31 32   BUN mg/dL 60* 62* 64* 49* 27* 38*   CREATININE mg/dL 8.56* 9.26* 10.01* 7.50* 5.15* 7.48*   CALCIUM mg/dL 8.0* 8.4 8.9 8.8 9.3 10.2   MAGNESIUM mg/dL 2.4 2.6  --   --  2.0 1.8*   PHOSPHORUS mg/dL 2.5 2.5  --   --   --  1.8*       IR temporary dialysis catheter placement   Final Result by Inocencio Ahumada MD (08/25 1646)   Impression:      Successful temporary dialysis catheter insertion. Workstation performed: [id number]         VAS lower limb arterial duplex, complete bilateral   Final Result by Linda Varela MD (08/24 9780)      IR tunneled central line removal   Final Result by Christoph Vang MD (08/24 4851)   Impression: Successful Perma-Cath removal as described. Workstation performed: [id number]         MRI foot/forefoot toes right wo contrast   Final Result by Noni Tam MD (08/23 8093)   First submetatarsal head plantar skin ulceration with suspected draining sinus tract extending to the first metatarsal phalangeal joint. Findings raise suspicion for septic arthropathy including marrow changes involving both the tibial and fibular    sesamoid bones consistent with osteomyelitis. Minimal marrow changes across the first metatarsal phalangeal joint at this time. The study was marked in [facility] for immediate notification. Workstation performed: [id number]         MRI brain wo contrast   Final Result by Greyson Scott MD (08/22 1338)      Limited, motion degraded examination. There is no evidence for an acute territorial infarct. Workstation performed: [id number]         XR foot 2 vw left   Final Result by Darrel Quinteros MD (08/24 1637)      No definite radiographic evidence of osteomyelitis.  If there is clinical concern, MRI can be obtained. Workstation performed: [id number]         XR foot 3+ vw right   Final Result by Amy Young MD (08/24 1039)      1. Possible soft tissue swelling in the soft tissues adjacent to the first MTP joint. Correlate with physical signs for infection or inflammation. 2. No radiographic evidence of osteomyelitis however radiographic changes are typically delayed. If this is of continued clinical concern further assessment with MRI and/or three-phase bone scan may be obtained for further assessment. 3. Extensive arthritic changes in the foot as discussed above. Workstation performed: [id number]         XR chest portable   Final Result by Varsha Mcclain MD (08/24 3697)      Increased reticular opacity in the medial aspect of the right lung base compared to 1/21/2022, which can represent infectious pneumonitis, aspiration pneumonitis or subsegmental atelectasis. Clinical correlation is recommended. The study was marked in EPIC for significant notification. Workstation performed: [id number]         CTA head and neck w wo contrast   Final Result by Priscilla Villarreal MD (08/20 2054)      No acute intracranial abnormality. Tiny hypodensity right posterior inferior putamen, likely chronic lacunar infarct or dilated perivascular space. Negative CTA head and neck for large vessel occlusion, dissection, aneurysm, or high-grade stenosis. Possible tracheobronchomalacia. Additional chronic/incidental findings as detailed above. The study was marked in [facility] for immediate notification. Workstation performed: [id number]         XR foot 3+ vw right    (Results Pending)       Portions of the record may have been created with voice recognition software. Occasional wrong word or "sound a like" substitutions may have occurred due to the inherent limitations of voice recognition software.  Read the chart carefully and recognize, using context, where substitutions have occurred.

## 2023-08-25 NOTE — DISCHARGE INSTRUCTIONS
Perma-cath Placement   WHAT YOU NEED TO KNOW:   A perma-cath is a catheter placed through a vein into or near your right atrium. Your right atrium is the right upper chamber of your heart. A perma-cath is used for dialysis in an emergency or until a long-term device is ready to use. After your procedure, you will have some pain and swelling on your chest and neck. You may have some bruises on your chest and neck. You may also have 2 dressings, one on your chest and one on your neck. DISCHARGE INSTRUCTIONS:   Call 911 for any of the following: You feel lightheaded, short of breath, and have chest pain. Your catheter comes out   Contact Interventional Radiology at [phone] Dada PATIENTS: Contact Interventional Radiology at [phone]) Peg Hermila PATIENTS: Contact Interventional Radiology at [phone]) if:  Blood soaks through your bandage. You have new swelling in your arm, neck, face, or chest on your right side. Your catheter gets wet. Your bruises or pain get worse. You have a fever or chills. Persistent nausea or vomiting. Your incision is red, swollen, or draining pus. You have questions or concerns about your condition or care. Self-care:       Resume your normal diet. Keep your dressings dry. Do not take a shower or swim. You may take a tub bath, but do not get your dressings wet. Water in your wound can cause bacteria to grow and cause an infection. If your dressing gets wet, dry it off and cover it with dry sterile gauze. Call your healthcare provider. Do not use soaps or ointments. Do not change your dressings. Your healthcare provider or dialysis nurse will change your dressings. Your dressings should stay in place until your healthcare provider removes them. The dressing on your chest will stay as long as you have the catheter in place. The dressing prevents infection. Do not remove the red and blue caps from the end of your catheter.  The caps prevent air from getting into your catheter. Follow up with your healthcare provider as directed: Write down your questions so you remember to ask them during your visits.

## 2023-08-25 NOTE — PROGRESS NOTES
NEPHROLOGY PROGRESS NOTE   Lydia Le 58 y.o. male MRN: [id number]  Unit/Bed#: [address] [place] [address]- Encounter: [id number]  Reason for Consult: Management of ESRD    ASSESSMENT AND PLAN:  59 yo man with PMH of ESRD on HD TTS p/w AMS.   Nephrology is consulted for management of ESRD                PLAN:     #ESRD on HD TTS:  • Dialysis unit/days:[facility]   • Access: Right IJ PermCath  • Plan for HD p this afternoon after line placement   • If patient cannot have dialysis this afternoon he will be rescheduled for tomorrow morning   • Fluid restriction 1-1.5L/d  • Adjust medications to GFR<10  • Avoid opioids      #Volume status/hypertension:  • Volume: Euvolemic  • Blood pressure:     Normotensive, /53mmhg, goal <140/90  • low-sodium diet  • Coreg 12.5 twice daily  • Nifedipine 30 mg twice daily     #Secondary Hyperparasitoidism   • On sevelamer only with meals     # Anemia of Kidney Disease  • Current hemoglobin: 10 mg/dL  • At goal  • Treatment:  • Transfuse for hemoglobin less than 7.0 per primary service          #AMS  • Unclear etiology thought to be secondary to sepsis     #Bacteremia  • PermCath removed on Wednesday, currently line holiday  • Plan for temporary HD line today  • PermCath when blood cultures negative  • ID following    SUBJECTIVE:  Patient seen and examined at bedside.  No chest pain, shortness of breath, nausea, vomiting, abdominal pain or diarrhea    OBJECTIVE:  Current Weight: Weight - Scale: 115 kg (253 lb)  Vitals:    08/25/23 0816   BP: 120/53   Pulse: 63   Resp:    Temp:    SpO2: 95%       Intake/Output Summary (Last 24 hours) at 8/25/2023 0941  Last data filed at 8/25/2023 0527  Gross per 24 hour   Intake --   Output 300 ml   Net -300 ml     Wt Readings from Last 3 Encounters:   08/24/23 115 kg (253 lb)   07/31/22 102 kg (225 lb 6.4 oz)   07/06/22 106 kg (233 lb 11 oz)     Temp Readings from Last 3 Encounters:   08/24/23 99.3 °F (37.4 °C)   07/31/22 98.4 °F (36.9 °C) (Temporal) 07/13/22 (!) 97 °F (36.1 °C) (Oral)     BP Readings from Last 3 Encounters:   08/25/23 120/53   07/31/22 118/60   07/14/22 139/52     Pulse Readings from Last 3 Encounters:   08/25/23 63   07/31/22 61   07/14/22 65        General:  no acute distress at this time  Skin:  No acute rash  Eyes:  No scleral icterus and noninjected  ENT:  mucous membranes moist  Neck:  no carotid bruits  Chest:  Clear to auscultation percussion, good respiratory effort, no use of accessory respiratory muscles  CVS:  Regular rate and rhythm without rub   Abdomen:  soft and nontender   Extremities: no significant lower extremity edema  Neuro:  No gross focality  Psych:  Alert , cooperative     Medications:    Current Facility-Administered Medications:   •  acetaminophen (TYLENOL) tablet 650 mg, 650 mg, Oral, Q6H PRN, Trang Rodriguez MD, 650 mg at 08/20/23 1141  •  acetaminophen (TYLENOL) tablet 975 mg, 975 mg, Oral, Q8H, Joel Dalton MD, 975 mg at 08/24/23 0148  •  atorvastatin (LIPITOR) tablet 80 mg, 80 mg, Oral, Daily, Trang Rodriguez MD, 80 mg at 08/25/23 0835  •  carvedilol (COREG) tablet 12.5 mg, 12.5 mg, Oral, BID, Trang Rodriguez MD, 12.5 mg at 08/25/23 0835  •  cefepime (MAXIPIME) IVPB (premix in dextrose) 1,000 mg 50 mL, 1,000 mg, Intravenous, Q24H, Jaren Méndez DO, Last Rate: 100 mL/hr at 08/24/23 1418, 1,000 mg at 08/24/23 1418  •  famotidine (PEPCID) tablet 10 mg, 10 mg, Oral, Daily, Trang Rodriguez MD, 10 mg at 08/25/23 0270  •  gabapentin (NEURONTIN) oral solution 200 mg, 200 mg, Oral, BID, Jaren Méndez DO, 200 mg at 08/24/23 1805  •  haloperidol lactate (HALDOL) injection 2 mg, 2 mg, Intramuscular, Q6H PRN, Jaren Méndez DO, 2 mg at 08/23/23 1441  •  heparin (porcine) subcutaneous injection 5,000 Units, 5,000 Units, Subcutaneous, Q8H [facility], Trang Rodriguez MD, 5,000 Units at 08/25/23 0526  •  HYDROmorphone (DILAUDID) injection 1 mg, 1 mg, Intravenous, Q4H PRN, Trang Rodriguez MD, 1 mg at 08/24/23 2222  •  insulin glargine (LANTUS) subcutaneous injection 15 Units 0.15 mL, 15 Units, Subcutaneous, HS, Jaren Méndez DO, 15 Units at 08/24/23 2124  •  insulin lispro (HumaLOG) 100 units/mL subcutaneous injection 1-5 Units, 1-5 Units, Subcutaneous, HS, Marcio Patterson MD, 1 Units at 08/24/23 2124  •  insulin lispro (HumaLOG) 100 units/mL subcutaneous injection 2-12 Units, 2-12 Units, Subcutaneous, TID AC, 4 Units at 08/24/23 1757 **AND** Fingerstick Glucose (POCT), , , TID AC, Joel Dalton MD  •  insulin lispro (HumaLOG) 100 units/mL subcutaneous injection 5 Units, 5 Units, Subcutaneous, TID With Meals, Jaren Méndez DO, 5 Units at 08/24/23 1852  •  lidocaine (LIDODERM) 5 % patch 1 patch, 1 patch, Topical, Daily, Marcio Patterson MD, 1 patch at 08/25/23 0834  •  LORazepam (ATIVAN) injection 0.5 mg, 0.5 mg, Intravenous, Q6H PRN, Marcio Patterson MD, 0.5 mg at 08/22/23 1240  •  methocarbamol (ROBAXIN) tablet 500 mg, 500 mg, Oral, Q12H PRN, Marcio Patterson MD, 500 mg at 08/24/23 2049  •  metroNIDAZOLE (FLAGYL) tablet 500 mg, 500 mg, Oral, Q8H [address], Jaren Méndez DO, 500 mg at 08/25/23 0309  •  NIFEdipine (PROCARDIA XL) 24 hr tablet 30 mg, 30 mg, Oral, BID, Joel Dalton MD, 30 mg at 08/25/23 0835  •  ondansetron (ZOFRAN) injection 4 mg, 4 mg, Intravenous, Q6H PRN, Marcio Patterson MD  •  oxyCODONE (ROXICODONE) IR tablet 5 mg, 5 mg, Oral, Q8H PRN, Joel Dalton MD, 5 mg at 08/24/23 2049  •  senna-docusate sodium (SENOKOT S) 8.6-50 mg per tablet 2 tablet, 2 tablet, Oral, BID, Jaren Méndez DO, 2 tablet at 08/25/23 6924  •  sevelamer (RENAGEL) tablet 800 mg, 800 mg, Oral, TID With Meals, Marcio Patterson MD, 800 mg at 08/24/23 9845    Laboratory Results:  Results from last 7 days   Lab Units 08/25/23  0438 08/23/23  0414 08/22/23  0516 08/21/23  0546 08/20/23  0531 08/19/23  1053   WBC Thousand/uL 9.96 8.85 8.06 7.42 11.35* 14.01*   HEMOGLOBIN g/dL 10.0* 10.1* 11.6* 11.8* 11.7* 11.4*   HEMATOCRIT % 32.1* 31.8* 36.2* 37.0 37.3 35.5*   PLATELETS Thousands/uL 264 219 230 195 211 213   SODIUM mmol/L 128* 133* 133* 134* 134* 134*   POTASSIUM mmol/L 3.9 4.3 4.6 4.1 3.9 4.1   CHLORIDE mmol/L 93* 94* 93* 94* 94* 94*   CO2 mmol/L 22 28 27 28 31 32   BUN mg/dL 60* 62* 64* 49* 27* 38*   CREATININE mg/dL 8.56* 9.26* 10.01* 7.50* 5.15* 7.48*   CALCIUM mg/dL 8.0* 8.4 8.9 8.8 9.3 10.2   MAGNESIUM mg/dL 2.4 2.6  --   --  2.0 1.8*   PHOSPHORUS mg/dL 2.5 2.5  --   --   --  1.8*       VAS lower limb arterial duplex, complete bilateral   Final Result by Marlon Nazario MD (08/24 1960)      IR tunneled central line removal   Final Result by Anna Marie Black MD (08/24 3481)   Impression: Successful Perma-Cath removal as described. Workstation performed: [id number]         MRI foot/forefoot toes right wo contrast   Final Result by Kajal Neff MD (08/23 6523)   First submetatarsal head plantar skin ulceration with suspected draining sinus tract extending to the first metatarsal phalangeal joint. Findings raise suspicion for septic arthropathy including marrow changes involving both the tibial and fibular    sesamoid bones consistent with osteomyelitis. Minimal marrow changes across the first metatarsal phalangeal joint at this time. The study was marked in [facility] for immediate notification. Workstation performed: [id number]         MRI brain wo contrast   Final Result by Kanika Rose MD (08/22 4838)      Limited, motion degraded examination. There is no evidence for an acute territorial infarct. Workstation performed: [id number]         XR foot 2 vw left   Final Result by Michel Villagomez MD (08/24 0857)      No definite radiographic evidence of osteomyelitis. If there is clinical concern, MRI can be obtained. Workstation performed: [id number]         XR foot 3+ vw right   Final Result by Janina Li MD (08/24 4709)      1.  Possible soft tissue swelling in the soft tissues adjacent to the first MTP joint. Correlate with physical signs for infection or inflammation. 2. No radiographic evidence of osteomyelitis however radiographic changes are typically delayed. If this is of continued clinical concern further assessment with MRI and/or three-phase bone scan may be obtained for further assessment. 3. Extensive arthritic changes in the foot as discussed above. Workstation performed: [id number]         XR chest portable   Final Result by Romayne Ligas, MD (08/24 3029)      Increased reticular opacity in the medial aspect of the right lung base compared to 1/21/2022, which can represent infectious pneumonitis, aspiration pneumonitis or subsegmental atelectasis. Clinical correlation is recommended. The study was marked in EPIC for significant notification. Workstation performed: [id number]         CTA head and neck w wo contrast   Final Result by Etta Elam MD (08/20 2054)      No acute intracranial abnormality. Tiny hypodensity right posterior inferior putamen, likely chronic lacunar infarct or dilated perivascular space. Negative CTA head and neck for large vessel occlusion, dissection, aneurysm, or high-grade stenosis. Possible tracheobronchomalacia. Additional chronic/incidental findings as detailed above. The study was marked in [facility] for immediate notification. Workstation performed: [id number]         IR temporary dialysis catheter placement    (Results Pending)       Portions of the record may have been created with voice recognition software. Occasional wrong word or "sound a like" substitutions may have occurred due to the inherent limitations of voice recognition software. Read the chart carefully and recognize, using context, where substitutions have occurred.

## 2023-08-25 NOTE — PLAN OF CARE
Problem: Potential for Falls  Goal: Patient will remain free of falls  Description: INTERVENTIONS:  - Educate patient/family on patient safety including physical limitations  - Instruct patient to call for assistance with activity   - Consult OT/PT to assist with strengthening/mobility   - Keep Call bell within reach  - Keep bed low and locked with side rails adjusted as appropriate  - Keep care items and personal belongings within reach  - Initiate and maintain comfort rounds  - Make Fall Risk Sign visible to staff  - Offer Toileting every 2 Hours, in advance of need  - Initiate/Maintain bed alarm  - Obtain necessary fall risk management equipment: yellow socks  - Apply yellow socks and bracelet for high fall risk patients  - Consider moving patient to room near nurses station  Outcome: Progressing     Problem: MOBILITY - ADULT  Goal: Maintain or return to baseline ADL function  Description: INTERVENTIONS:  -  Assess patient's ability to carry out ADLs; assess patient's baseline for ADL function and identify physical deficits which impact ability to perform ADLs (bathing, care of mouth/teeth, toileting, grooming, dressing, etc.)  - Assess/evaluate cause of self-care deficits   - Assess range of motion  - Assess patient's mobility; develop plan if impaired  - Assess patient's need for assistive devices and provide as appropriate  - Encourage maximum independence but intervene and supervise when necessary  - Involve family in performance of ADLs  - Assess for home care needs following discharge   - Consider OT consult to assist with ADL evaluation and planning for discharge  - Provide patient education as appropriate  Outcome: Progressing  Goal: Maintains/Returns to pre admission functional level  Description: INTERVENTIONS:  - Perform BMAT or MOVE assessment daily.   - Set and communicate daily mobility goal to care team and patient/family/caregiver.    - Collaborate with rehabilitation services on mobility goals if consulted  - Perform Range of Motion 4 times a day. - Reposition patient every 2 hours.   - Dangle patient 3 times a day  - Stand patient 3 times a day  - Ambulate patient 3 times a day  - Out of bed to chair 3 times a day   - Out of bed for meals 3 times a day  - Out of bed for toileting  - Record patient progress and toleration of activity level   Outcome: Progressing     Problem: PAIN - ADULT  Goal: Verbalizes/displays adequate comfort level or baseline comfort level  Description: Interventions:  - Encourage patient to monitor pain and request assistance  - Assess pain using appropriate pain scale  - Administer analgesics based on type and severity of pain and evaluate response  - Implement non-pharmacological measures as appropriate and evaluate response  - Consider cultural and social influences on pain and pain management  - Notify physician/advanced practitioner if interventions unsuccessful or patient reports new pain  Outcome: Progressing     Problem: INFECTION - ADULT  Goal: Absence or prevention of progression during hospitalization  Description: INTERVENTIONS:  - Assess and monitor for signs and symptoms of infection  - Monitor lab/diagnostic results  - Monitor all insertion sites, i.e. indwelling lines, tubes, and drains  - Monitor endotracheal if appropriate and nasal secretions for changes in amount and color  - [place] appropriate cooling/warming therapies per order  - Administer medications as ordered  - Instruct and encourage patient and family to use good hand hygiene technique  - Identify and instruct in appropriate isolation precautions for identified infection/condition  Outcome: Progressing  Goal: Absence of fever/infection during neutropenic period  Description: INTERVENTIONS:  - Monitor WBC    Outcome: Progressing     Problem: SAFETY ADULT  Goal: Patient will remain free of falls  Description: INTERVENTIONS:  - Educate patient/family on patient safety including physical limitations  - Instruct patient to call for assistance with activity   - Consult OT/PT to assist with strengthening/mobility   - Keep Call bell within reach  - Keep bed low and locked with side rails adjusted as appropriate  - Keep care items and personal belongings within reach  - Initiate and maintain comfort rounds  - Make Fall Risk Sign visible to staff  - Offer Toileting every 2 Hours, in advance of need  - Initiate/Maintain bed alarm  - Obtain necessary fall risk management equipment: yellow socks  - Apply yellow socks and bracelet for high fall risk patients  - Consider moving patient to room near nurses station  Outcome: Progressing  Goal: Maintain or return to baseline ADL function  Description: INTERVENTIONS:  -  Assess patient's ability to carry out ADLs; assess patient's baseline for ADL function and identify physical deficits which impact ability to perform ADLs (bathing, care of mouth/teeth, toileting, grooming, dressing, etc.)  - Assess/evaluate cause of self-care deficits   - Assess range of motion  - Assess patient's mobility; develop plan if impaired  - Assess patient's need for assistive devices and provide as appropriate  - Encourage maximum independence but intervene and supervise when necessary  - Involve family in performance of ADLs  - Assess for home care needs following discharge   - Consider OT consult to assist with ADL evaluation and planning for discharge  - Provide patient education as appropriate  Outcome: Progressing  Goal: Maintains/Returns to pre admission functional level  Description: INTERVENTIONS:  - Perform BMAT or MOVE assessment daily.   - Set and communicate daily mobility goal to care team and patient/family/caregiver. - Collaborate with rehabilitation services on mobility goals if consulted  - Perform Range of Motion 4 times a day. - Reposition patient every 2 hours.   - Dangle patient 3 times a day  - Stand patient 3 times a day  - Ambulate patient 3 times a day  - Out of bed to chair 3 times a day   - Out of bed for meals 3 times a day  - Out of bed for toileting  - Record patient progress and toleration of activity level   Outcome: Progressing     Problem: DISCHARGE PLANNING  Goal: Discharge to home or other facility with appropriate resources  Description: INTERVENTIONS:  - Identify barriers to discharge w/patient and caregiver  - Arrange for needed discharge resources and transportation as appropriate  - Identify discharge learning needs (meds, wound care, etc.)  - Arrange for interpretive services to assist at discharge as needed  - Refer to Case Management Department for coordinating discharge planning if the patient needs post-hospital services based on physician/advanced practitioner order or complex needs related to functional status, cognitive ability, or social support system  Outcome: Progressing     Problem: Knowledge Deficit  Goal: Patient/family/caregiver demonstrates understanding of disease process, treatment plan, medications, and discharge instructions  Description: Complete learning assessment and assess knowledge base.   Interventions:  - Provide teaching at level of understanding  - Provide teaching via preferred learning methods  Outcome: Progressing     Problem: METABOLIC, FLUID AND ELECTROLYTES - ADULT  Goal: Electrolytes maintained within normal limits  Description: INTERVENTIONS:  - Monitor labs and assess patient for signs and symptoms of electrolyte imbalances  - Administer electrolyte replacement as ordered  - Monitor response to electrolyte replacements, including repeat lab results as appropriate  - Instruct patient on fluid and nutrition as appropriate  Outcome: Progressing  Goal: Fluid balance maintained  Description: INTERVENTIONS:  - Monitor labs   - Monitor I/O and WT  - Instruct patient on fluid and nutrition as appropriate  - Assess for signs & symptoms of volume excess or deficit  Outcome: Progressing     Problem: Nutrition/Hydration-ADULT  Goal: Nutrient/Hydration intake appropriate for improving, restoring or maintaining nutritional needs  Description: Monitor and assess patient's nutrition/hydration status for malnutrition. Collaborate with interdisciplinary team and initiate plan and interventions as ordered. Monitor patient's weight and dietary intake as ordered or per policy. Utilize nutrition screening tool and intervene as necessary. Determine patient's food preferences and provide high-protein, high-caloric foods as appropriate.      INTERVENTIONS:  - Monitor oral intake, urinary output, labs, and treatment plans  - Assess nutrition and hydration status and recommend course of action  - Evaluate amount of meals eaten  - Assist patient with eating if necessary   - Allow adequate time for meals  - Recommend/ encourage appropriate diets, oral nutritional supplements, and vitamin/mineral supplements  - Order, calculate, and assess calorie counts as needed  - Recommend, monitor, and adjust tube feedings and TPN/PPN based on assessed needs  - Assess need for intravenous fluids  - Provide specific nutrition/hydration education as appropriate  - Include patient/family/caregiver in decisions related to nutrition  Outcome: Progressing     Problem: SAFETY,RESTRAINT: NV/NON-SELF DESTRUCTIVE BEHAVIOR  Goal: Remains free of harm/injury (restraint for non violent/non self-detsructive behavior)  Description: INTERVENTIONS:  - Instruct patient/family regarding restraint use   - Assess and monitor physiologic and psychological status   - Provide interventions and comfort measures to meet assessed patient needs   - Identify and implement measures to help patient regain control  - Assess readiness for release of restraint   Outcome: Progressing  Goal: Returns to optimal restraint-free functioning  Description: INTERVENTIONS:  - Assess the patient's behavior and symptoms that indicate continued need for restraint  - Identify and implement measures to help patient regain control  - Assess readiness for release of restraint   Outcome: Progressing     Problem: Prexisting or High Potential for Compromised Skin Integrity  Goal: Skin integrity is maintained or improved  Description: INTERVENTIONS:  - Identify patients at risk for skin breakdown  - Assess and monitor skin integrity  - Assess and monitor nutrition and hydration status  - Monitor labs   - Assess for incontinence   - Turn and reposition patient  - Assist with mobility/ambulation  - Relieve pressure over bony prominences  - Avoid friction and shearing  - Provide appropriate hygiene as needed including keeping skin clean and dry  - Evaluate need for skin moisturizer/barrier cream  - Collaborate with interdisciplinary team   - Patient/family teaching  - Consider wound care consult   Outcome: Progressing

## 2023-08-25 NOTE — PROGRESS NOTES
Progress Note - Podiatry  Андрей Albrecht 58 y.o. male MRN: [id number]  Unit/Bed#: [address] [place] [address] Encounter: [id number]    Assessment:  Glorya Ananeri grade 3 ulcer plantar aspect right foot. Probable contiguous osteomyelitis right first MPJ. Resolving cellulitis right foot. Diabetic neuropathy. Acquired deformity of foot. History of lesser toe amputation. Plan:  Chart reviewed. Patient seen at bedside. Change of dry sterile dressing done today. Significant improvement in cellulitis of right foot noted. Infectious disease note appreciated. Awaiting results of deep cultures to identify infectious organism. Vascular surgery has been consulted for possible intervention. Patient has apparent blockage of the trifurcation of the right lower extremity arterial trunk. Continue wound care. Continue antibiosis. Patient still at high risk for ray resection/amputation. Patient is aware however he would like to salvage limb but would be amenable to surgical cure. Subjective/Objective   Chief Complaint:   Chief Complaint   Patient presents with   • Fall     Pt arrives BLS from home, reported that he was found on the ground, co of lower back pain. Pt awake, follows commands. Not answers the time and year "I don't know". Daughter called 911. Pt doesn't know how he fall and how long he has been on the floor. Subjective: Patient seen at bedside. He has minimal pain of the right foot. .  His only complaint today is pain of his back. Blood pressure 112/53, pulse 67, temperature 97.6 °F (36.4 °C), temperature source Tympanic, resp. rate 15, height 6' 1" (1.854 m), weight 115 kg (253 lb), SpO2 98 %. ,Body mass index is 33.38 kg/m².     Invasive Devices     Peripheral Intravenous Line  Duration           Peripheral IV 08/23/23 Left;Ventral (anterior) Forearm 2 days          Hemodialysis Catheter  Duration           HD Temporary Double Catheter <1 day                Physical Exam:   General appearance: alert, cooperative and no distress  Neuro/Vascular: No change in neurovascular status except decrease in edema and erythema right foot. Profound hypoesthesia of the foot bilateral.    Arterial Doppler demonstrates atherosclerosis of the lower extremity bilateral.  There is change in velocity noted of the trifurcation area of the right lower extremity. ABIs unobtainable due to calcification of vessel. This is consistent with peripheral artery disease. Extremity: Left foot demonstrates healing wound plantar aspect of first MPJ. No evidence of infection. Right foot has dry sterile dressing. Removed. There is a Delgadillo grade 3 ulcer that probes to bone/sesamoids of the first MPJ of the right foot. Decrease edema and erythema noted. No evidence of occult abscess or pus. Culture and sensitivity demonstrates gram-positive cocci in pairs. Labs and other studies:   CBC w/diff  Results from last 7 days   Lab Units 08/25/23  0438 08/23/23  0414 08/22/23  0516   WBC Thousand/uL 9.96   < > 8.06   HEMOGLOBIN g/dL 10.0*   < > 11.6*   HEMATOCRIT % 32.1*   < > 36.2*   PLATELETS Thousands/uL 264   < > 230   NEUTROS PCT %  --   --  74   LYMPHS PCT %  --   --  12*   MONOS PCT %  --   --  10   EOS PCT %  --   --  2    < > = values in this interval not displayed. BMP  Results from last 7 days   Lab Units 08/25/23  0438   POTASSIUM mmol/L 3.9   CHLORIDE mmol/L 93*   CO2 mmol/L 22   BUN mg/dL 60*   CREATININE mg/dL 8.56*   CALCIUM mg/dL 8.0*     CMP  Results from last 7 days   Lab Units 08/25/23  0438 08/20/23  0531 08/19/23  1053   POTASSIUM mmol/L 3.9   < > 4.1   CHLORIDE mmol/L 93*   < > 94*   CO2 mmol/L 22   < > 32   BUN mg/dL 60*   < > 38*   CREATININE mg/dL 8.56*   < > 7.48*   CALCIUM mg/dL 8.0*   < > 10.2   ALK PHOS U/L  --   --  69   ALT U/L  --   --  14   AST U/L  --   --  35    < > = values in this interval not displayed. @Culture@  Lab Results   Component Value Date    BLOODCX No Growth at 24 hrs. 08/24/2023    BLOODCX No Growth at 24 hrs. 08/24/2023    BLOODCX Enterobacter cloacae (A) 08/21/2023    BLOODCX Granulicatella adiacens (A) 08/21/2023    BLOODCX Proteus vulgaris (A) 08/21/2023    BLOODCX Bacteroides fragilis (A) 08/21/2023    BLOODCX No Growth After 4 Days. 08/21/2023    BLOODCX No Growth After 5 Days. 07/26/2022    BLOODCX No Growth After 5 Days.  07/26/2022    WOUNDCULT Culture too young- will reincubate 08/24/2023    WOUNDCULT 1+ Growth of Pseudomonas aeruginosa (A) 08/21/2023    WOUNDCULT 2+ Growth of Enterobacter cloacae (A) 08/21/2023    WOUNDCULT 2+ Growth of 08/21/2023         Current Facility-Administered Medications:   •  acetaminophen (TYLENOL) tablet 650 mg, 650 mg, Oral, Q6H PRN, Marizol Flanagan MD, 650 mg at 08/20/23 1141  •  acetaminophen (TYLENOL) tablet 975 mg, 975 mg, Oral, Q8H, Joel Dalton MD, 975 mg at 08/24/23 0148  •  atorvastatin (LIPITOR) tablet 80 mg, 80 mg, Oral, Daily, Marizol Flanagan MD, 80 mg at 08/25/23 0835  •  carvedilol (COREG) tablet 12.5 mg, 12.5 mg, Oral, BID, Marizol Flanagan MD, 12.5 mg at 08/25/23 0835  •  cefepime (MAXIPIME) IVPB (premix in dextrose) 1,000 mg 50 mL, 1,000 mg, Intravenous, Q24H, Jaren Méndez DO, Last Rate: 100 mL/hr at 08/24/23 1418, 1,000 mg at 08/24/23 1418  •  famotidine (PEPCID) tablet 10 mg, 10 mg, Oral, Daily, Marizol Flanagan MD, 10 mg at 08/25/23 9729  •  gabapentin (NEURONTIN) oral solution 200 mg, 200 mg, Oral, BID, Jaren Méndez DO, 200 mg at 08/25/23 1707  •  haloperidol lactate (HALDOL) injection 2 mg, 2 mg, Intramuscular, Q6H PRN, Jaren Méndez DO, 2 mg at 08/23/23 1441  •  heparin (porcine) subcutaneous injection 5,000 Units, 5,000 Units, Subcutaneous, Q8H [address], Marizol Flanagan MD, 5,000 Units at 08/25/23 1440  •  HYDROmorphone (DILAUDID) injection 1 mg, 1 mg, Intravenous, Q4H PRN, Marizol Flanagan MD, 1 mg at 08/24/23 2222  •  insulin glargine (LANTUS) subcutaneous injection 15 Units 0.15 mL, 15 Units, Subcutaneous, HS, Jaren Méndez DO, 15 Units at 08/24/23 2124  •  insulin lispro (HumaLOG) 100 units/mL subcutaneous injection 1-5 Units, 1-5 Units, Subcutaneous, HS, Siva Bull MD, 1 Units at 08/24/23 2124  •  insulin lispro (HumaLOG) 100 units/mL subcutaneous injection 2-12 Units, 2-12 Units, Subcutaneous, TID AC, 2 Units at 08/25/23 1659 **AND** Fingerstick Glucose (POCT), , , TID AC, Joel Dalton MD  •  insulin lispro (HumaLOG) 100 units/mL subcutaneous injection 5 Units, 5 Units, Subcutaneous, TID With Meals, Jaren Méndez DO, 5 Units at 08/25/23 1702  •  lidocaine (LIDODERM) 5 % patch 1 patch, 1 patch, Topical, Daily, Siva Bull MD, 1 patch at 08/25/23 0834  •  LORazepam (ATIVAN) injection 0.5 mg, 0.5 mg, Intravenous, Q6H PRN, Siva Bull MD, 0.5 mg at 08/22/23 1240  •  methocarbamol (ROBAXIN) tablet 500 mg, 500 mg, Oral, Q12H PRN, Siva Bull MD, 500 mg at 08/25/23 1659  •  metroNIDAZOLE (FLAGYL) tablet 500 mg, 500 mg, Oral, Q8H [address], Jaren Méndez DO, 500 mg at 08/25/23 1050  •  NIFEdipine (PROCARDIA XL) 24 hr tablet 30 mg, 30 mg, Oral, BID, Joel Dalton MD, 30 mg at 08/25/23 0835  •  ondansetron (ZOFRAN) injection 4 mg, 4 mg, Intravenous, Q6H PRN, Siva Bull MD  •  oxyCODONE (ROXICODONE) IR tablet 5 mg, 5 mg, Oral, Q8H PRN, Siva Bull MD, 5 mg at 08/25/23 1421  •  senna-docusate sodium (SENOKOT S) 8.6-50 mg per tablet 2 tablet, 2 tablet, Oral, BID, Pandi Todhe, DO, 2 tablet at 08/25/23 1700  •  sevelamer (RENAGEL) tablet 800 mg, 800 mg, Oral, TID With Meals, Siva Bull MD, 800 mg at 08/25/23 7226    Imaging: I have personally reviewed pertinent films in PACS  EKG, Pathology, and Other Studies: I have personally reviewed pertinent reports.   VTE Pharmacologic Prophylaxis: Sequential compression device (Venodyne)   VTE Mechanical Prophylaxis: sequential compression device          Counseling and Coordination of care  I spent 25 minutes face-to-face with patient today. More than 50% was spent in counseling & coordination of care. Counseled patient regarding need for work-up of peripheral artery disease prior to determining surgical path versus intravenous antibiosis. Maria Eugenia Godoy DPM

## 2023-08-26 ENCOUNTER — APPOINTMENT (INPATIENT)
Dept: RADIOLOGY | Facility: HOSPITAL | Age: 63
DRG: 474 | End: 2023-08-26
Payer: MEDICARE

## 2023-08-26 ENCOUNTER — APPOINTMENT (INPATIENT)
Dept: DIALYSIS | Facility: HOSPITAL | Age: 63
DRG: 474 | End: 2023-08-26
Attending: INTERNAL MEDICINE
Payer: MEDICARE

## 2023-08-26 LAB
ALBUMIN SERPL BCP-MCNC: 3.3 G/DL (ref 3.5–5)
ANION GAP SERPL CALCULATED.3IONS-SCNC: 10 MMOL/L
BACTERIA BLD CULT: NORMAL
BACTERIA CATH TIP CULT: ABNORMAL
BACTERIA WND AEROBE CULT: ABNORMAL
BUN SERPL-MCNC: 40 MG/DL (ref 5–25)
CALCIUM ALBUM COR SERPL-MCNC: 8.6 MG/DL (ref 8.3–10.1)
CALCIUM SERPL-MCNC: 8 MG/DL (ref 8.4–10.2)
CHLORIDE SERPL-SCNC: 93 MMOL/L (ref 96–108)
CO2 SERPL-SCNC: 27 MMOL/L (ref 21–32)
CREAT SERPL-MCNC: 6.71 MG/DL (ref 0.6–1.3)
ERYTHROCYTE [DISTWIDTH] IN BLOOD BY AUTOMATED COUNT: 16.8 % (ref 11.6–15.1)
GFR SERPL CREATININE-BSD FRML MDRD: 7 ML/MIN/1.73SQ M
GLUCOSE SERPL-MCNC: 100 MG/DL (ref 65–140)
GLUCOSE SERPL-MCNC: 101 MG/DL (ref 65–140)
GLUCOSE SERPL-MCNC: 155 MG/DL (ref 65–140)
GLUCOSE SERPL-MCNC: 159 MG/DL (ref 65–140)
GLUCOSE SERPL-MCNC: 84 MG/DL (ref 65–140)
GRAM STN SPEC: ABNORMAL
GRAM STN SPEC: ABNORMAL
HCT VFR BLD AUTO: 31.9 % (ref 36.5–49.3)
HGB BLD-MCNC: 10.3 G/DL (ref 12–17)
MAGNESIUM SERPL-MCNC: 2.3 MG/DL (ref 1.9–2.7)
MCH RBC QN AUTO: 29.2 PG (ref 26.8–34.3)
MCHC RBC AUTO-ENTMCNC: 32.3 G/DL (ref 31.4–37.4)
MCV RBC AUTO: 90 FL (ref 82–98)
PHOSPHATE SERPL-MCNC: 2.5 MG/DL (ref 2.3–4.1)
PLATELET # BLD AUTO: 277 THOUSANDS/UL (ref 149–390)
PMV BLD AUTO: 9.8 FL (ref 8.9–12.7)
POTASSIUM SERPL-SCNC: 3.8 MMOL/L (ref 3.5–5.3)
RBC # BLD AUTO: 3.53 MILLION/UL (ref 3.88–5.62)
SODIUM SERPL-SCNC: 130 MMOL/L (ref 135–147)
WBC # BLD AUTO: 10.55 THOUSAND/UL (ref 4.31–10.16)

## 2023-08-26 PROCEDURE — 80069 RENAL FUNCTION PANEL: CPT | Performed by: STUDENT IN AN ORGANIZED HEALTH CARE EDUCATION/TRAINING PROGRAM

## 2023-08-26 PROCEDURE — 99232 SBSQ HOSP IP/OBS MODERATE 35: CPT | Performed by: NURSE PRACTITIONER

## 2023-08-26 PROCEDURE — 83735 ASSAY OF MAGNESIUM: CPT | Performed by: STUDENT IN AN ORGANIZED HEALTH CARE EDUCATION/TRAINING PROGRAM

## 2023-08-26 PROCEDURE — 99232 SBSQ HOSP IP/OBS MODERATE 35: CPT | Performed by: STUDENT IN AN ORGANIZED HEALTH CARE EDUCATION/TRAINING PROGRAM

## 2023-08-26 PROCEDURE — 73630 X-RAY EXAM OF FOOT: CPT

## 2023-08-26 PROCEDURE — 82948 REAGENT STRIP/BLOOD GLUCOSE: CPT

## 2023-08-26 PROCEDURE — 85027 COMPLETE CBC AUTOMATED: CPT | Performed by: STUDENT IN AN ORGANIZED HEALTH CARE EDUCATION/TRAINING PROGRAM

## 2023-08-26 RX ADMIN — METRONIDAZOLE 500 MG: 500 TABLET ORAL at 17:45

## 2023-08-26 RX ADMIN — SENNOSIDES AND DOCUSATE SODIUM 2 TABLET: 50; 8.6 TABLET ORAL at 17:00

## 2023-08-26 RX ADMIN — SEVELAMER HYDROCHLORIDE 800 MG: 800 TABLET ORAL at 16:04

## 2023-08-26 RX ADMIN — ACETAMINOPHEN 975 MG: 325 TABLET ORAL at 12:06

## 2023-08-26 RX ADMIN — METRONIDAZOLE 500 MG: 500 TABLET ORAL at 02:08

## 2023-08-26 RX ADMIN — METRONIDAZOLE 500 MG: 500 TABLET ORAL at 12:06

## 2023-08-26 RX ADMIN — GABAPENTIN 200 MG: 250 SUSPENSION ORAL at 08:45

## 2023-08-26 RX ADMIN — INSULIN GLARGINE 15 UNITS: 100 INJECTION, SOLUTION SUBCUTANEOUS at 21:45

## 2023-08-26 RX ADMIN — ACETAMINOPHEN 975 MG: 325 TABLET ORAL at 17:45

## 2023-08-26 RX ADMIN — HEPARIN SODIUM 5000 UNITS: 5000 INJECTION INTRAVENOUS; SUBCUTANEOUS at 21:46

## 2023-08-26 RX ADMIN — INSULIN LISPRO 5 UNITS: 100 INJECTION, SOLUTION INTRAVENOUS; SUBCUTANEOUS at 12:08

## 2023-08-26 RX ADMIN — CARVEDILOL 12.5 MG: 12.5 TABLET, FILM COATED ORAL at 17:00

## 2023-08-26 RX ADMIN — HEPARIN SODIUM 5000 UNITS: 5000 INJECTION INTRAVENOUS; SUBCUTANEOUS at 16:03

## 2023-08-26 RX ADMIN — SENNOSIDES AND DOCUSATE SODIUM 2 TABLET: 50; 8.6 TABLET ORAL at 12:06

## 2023-08-26 RX ADMIN — NIFEDIPINE 30 MG: 30 TABLET, EXTENDED RELEASE ORAL at 12:06

## 2023-08-26 RX ADMIN — NIFEDIPINE 30 MG: 30 TABLET, EXTENDED RELEASE ORAL at 17:00

## 2023-08-26 RX ADMIN — INSULIN LISPRO 5 UNITS: 100 INJECTION, SOLUTION INTRAVENOUS; SUBCUTANEOUS at 08:21

## 2023-08-26 RX ADMIN — GABAPENTIN 200 MG: 250 SUSPENSION ORAL at 17:44

## 2023-08-26 RX ADMIN — INSULIN LISPRO 5 UNITS: 100 INJECTION, SOLUTION INTRAVENOUS; SUBCUTANEOUS at 16:04

## 2023-08-26 RX ADMIN — INSULIN LISPRO 2 UNITS: 100 INJECTION, SOLUTION INTRAVENOUS; SUBCUTANEOUS at 15:56

## 2023-08-26 RX ADMIN — FAMOTIDINE 10 MG: 20 TABLET, FILM COATED ORAL at 12:06

## 2023-08-26 RX ADMIN — ATORVASTATIN CALCIUM 80 MG: 80 TABLET, FILM COATED ORAL at 12:06

## 2023-08-26 RX ADMIN — INSULIN LISPRO 1 UNITS: 100 INJECTION, SOLUTION INTRAVENOUS; SUBCUTANEOUS at 21:45

## 2023-08-26 RX ADMIN — CARVEDILOL 12.5 MG: 12.5 TABLET, FILM COATED ORAL at 12:06

## 2023-08-26 RX ADMIN — SEVELAMER HYDROCHLORIDE 800 MG: 800 TABLET ORAL at 12:06

## 2023-08-26 RX ADMIN — CEFEPIME HYDROCHLORIDE 1000 MG: 1 INJECTION, SOLUTION INTRAVENOUS at 19:44

## 2023-08-26 RX ADMIN — HEPARIN SODIUM 5000 UNITS: 5000 INJECTION INTRAVENOUS; SUBCUTANEOUS at 05:33

## 2023-08-26 NOTE — PLAN OF CARE
Problem: Potential for Falls  Goal: Patient will remain free of falls  Description: INTERVENTIONS:  - Educate patient/family on patient safety including physical limitations  - Instruct patient to call for assistance with activity   - Consult OT/PT to assist with strengthening/mobility   - Keep Call bell within reach  - Keep bed low and locked with side rails adjusted as appropriate  - Keep care items and personal belongings within reach  - Initiate and maintain comfort rounds  - Make Fall Risk Sign visible to staff  - Apply yellow socks and bracelet for high fall risk patients  - Consider moving patient to room near nurses station  Outcome: Progressing     Problem: MOBILITY - ADULT  Goal: Maintain or return to baseline ADL function  Description: INTERVENTIONS:  - Educate patient/family on patient safety including physical limitations  - Instruct patient to call for assistance with activity   - Consult OT/PT to assist with strengthening/mobility   - Keep Call bell within reach  - Keep bed low and locked with side rails adjusted as appropriate  - Keep care items and personal belongings within reach  - Initiate and maintain comfort rounds  - Make Fall Risk Sign visible to staff  - Apply yellow socks and bracelet for high fall risk patients  - Consider moving patient to room near nurses station  Outcome: Progressing     Problem: MOBILITY - ADULT  Goal: Maintains/Returns to pre admission functional level  Description: INTERVENTIONS:  - Perform BMAT or MOVE assessment daily.   - Set and communicate daily mobility goal to care team and patient/family/caregiver.    - Collaborate with rehabilitation services on mobility goals if consulted  - Out of bed for toileting  - Record patient progress and toleration of activity level   Outcome: Progressing     Problem: PAIN - ADULT  Goal: Verbalizes/displays adequate comfort level or baseline comfort level  Description: Interventions:  - Encourage patient to monitor pain and request assistance  - Assess pain using appropriate pain scale  - Administer analgesics based on type and severity of pain and evaluate response  - Implement non-pharmacological measures as appropriate and evaluate response  - Consider cultural and social influences on pain and pain management  - Notify physician/advanced practitioner if interventions unsuccessful or patient reports new pain  Outcome: Progressing     Problem: INFECTION - ADULT  Goal: Absence or prevention of progression during hospitalization  Description: INTERVENTIONS:  - Assess and monitor for signs and symptoms of infection  - Monitor lab/diagnostic results  - Monitor all insertion sites, i.e. indwelling lines, tubes, and drains  - Monitor endotracheal if appropriate and nasal secretions for changes in amount and color  - [place] appropriate cooling/warming therapies per order  - Administer medications as ordered  - Instruct and encourage patient and family to use good hand hygiene technique  - Identify and instruct in appropriate isolation precautions for identified infection/condition  Outcome: Progressing     Problem: Knowledge Deficit  Goal: Patient/family/caregiver demonstrates understanding of disease process, treatment plan, medications, and discharge instructions  Description: Complete learning assessment and assess knowledge base.   Interventions:  - Provide teaching at level of understanding  - Provide teaching via preferred learning methods  Outcome: Progressing     Problem: METABOLIC, FLUID AND ELECTROLYTES - ADULT  Goal: Electrolytes maintained within normal limits  Description: INTERVENTIONS:  - Monitor labs and assess patient for signs and symptoms of electrolyte imbalances  - Administer electrolyte replacement as ordered  - Monitor response to electrolyte replacements, including repeat lab results as appropriate  - Instruct patient on fluid and nutrition as appropriate  Outcome: Progressing     Problem: SAFETY,RESTRAINT: NV/NON-SELF DESTRUCTIVE BEHAVIOR  Goal: Remains free of harm/injury (restraint for non violent/non self-detsructive behavior)  Description: INTERVENTIONS:  - Instruct patient/family regarding restraint use   - Assess and monitor physiologic and psychological status   - Provide interventions and comfort measures to meet assessed patient needs   - Identify and implement measures to help patient regain control  - Assess readiness for release of restraint   Outcome: Progressing     Problem: METABOLIC, FLUID AND ELECTROLYTES - ADULT  Goal: Fluid balance maintained  Description: INTERVENTIONS:  - Monitor labs   - Monitor I/O and WT  - Instruct patient on fluid and nutrition as appropriate  - Assess for signs & symptoms of volume excess or deficit  Outcome: Progressing

## 2023-08-26 NOTE — ASSESSMENT & PLAN NOTE
Lab Results   Component Value Date    HGBA1C 6.6 (H) 07/07/2022       Recent Labs     08/25/23  1646 08/25/23  2133 08/26/23  0719 08/26/23  1129   POCGLU 150* 181* 101 84       Blood Sugar Average: Last 72 hrs:  (P) [id number]

## 2023-08-26 NOTE — ASSESSMENT & PLAN NOTE
Resolved  Patient had been confused and more agitated and not following commands and refusing all treatment previous day; at baseline currently   UDS negative   Neurology consult pending at the current time-based on further course might need to consider MRI of the brain 2. Patient has known history of TIAs  CT of the head and neck with no acute intracranial abnormality with tiny hypodensity in the right posterior inferior putamen likely chronic lacunar infarct dilated prevascular space. Negative CTA for large vessel occlusion dissection or aneurysm with possible tracheobronchomalacia  Discussed with patient's friend-no history of alcohol use or drug use  Ammonia and TSH level were normal  Psychiatry was consulted-recommending Haldol 2 mg p.o. every 4 hours as needed for agitation at 2 mg IM as needed for severe agitation. Started septic work-up also as patient noted to have deep left foot wound-patient has been afebrile and had white count initially which has since normalized.   Suspect most symptoms 2/2 infection see below  B12, folate, TSH, ammonia WNL  Mentation appears to be improved; at baseline

## 2023-08-26 NOTE — PLAN OF CARE
Problem: Potential for Falls  Goal: Patient will remain free of falls  Description: INTERVENTIONS:  - Educate patient/family on patient safety including physical limitations  - Instruct patient to call for assistance with activity   - Consult OT/PT to assist with strengthening/mobility   - Keep Call bell within reach  - Keep bed low and locked with side rails adjusted as appropriate  - Keep care items and personal belongings within reach  - Initiate and maintain comfort rounds  - Make Fall Risk Sign visible to staff  - Offer Toileting every 2 Hours, in advance of need  - Initiate/Maintain bed alarm  - Obtain necessary fall risk management equipment: yellow socks  - Apply yellow socks and bracelet for high fall risk patients  - Consider moving patient to room near nurses station  Outcome: Progressing     Problem: MOBILITY - ADULT  Goal: Maintain or return to baseline ADL function  Description: INTERVENTIONS:  -  Assess patient's ability to carry out ADLs; assess patient's baseline for ADL function and identify physical deficits which impact ability to perform ADLs (bathing, care of mouth/teeth, toileting, grooming, dressing, etc.)  - Assess/evaluate cause of self-care deficits   - Assess range of motion  - Assess patient's mobility; develop plan if impaired  - Assess patient's need for assistive devices and provide as appropriate  - Encourage maximum independence but intervene and supervise when necessary  - Involve family in performance of ADLs  - Assess for home care needs following discharge   - Consider OT consult to assist with ADL evaluation and planning for discharge  - Provide patient education as appropriate  Outcome: Progressing  Goal: Maintains/Returns to pre admission functional level  Description: INTERVENTIONS:  - Perform BMAT or MOVE assessment daily.   - Set and communicate daily mobility goal to care team and patient/family/caregiver.    - Collaborate with rehabilitation services on mobility goals if consulted  - Perform Range of Motion 4 times a day. - Reposition patient every 2 hours.   - Dangle patient 3 times a day  - Stand patient 3 times a day  - Ambulate patient 3 times a day  - Out of bed to chair 3 times a day   - Out of bed for meals 3 times a day  - Out of bed for toileting  - Record patient progress and toleration of activity level   Outcome: Progressing     Problem: PAIN - ADULT  Goal: Verbalizes/displays adequate comfort level or baseline comfort level  Description: Interventions:  - Encourage patient to monitor pain and request assistance  - Assess pain using appropriate pain scale  - Administer analgesics based on type and severity of pain and evaluate response  - Implement non-pharmacological measures as appropriate and evaluate response  - Consider cultural and social influences on pain and pain management  - Notify physician/advanced practitioner if interventions unsuccessful or patient reports new pain  Outcome: Progressing     Problem: INFECTION - ADULT  Goal: Absence or prevention of progression during hospitalization  Description: INTERVENTIONS:  - Assess and monitor for signs and symptoms of infection  - Monitor lab/diagnostic results  - Monitor all insertion sites, i.e. indwelling lines, tubes, and drains  - Monitor endotracheal if appropriate and nasal secretions for changes in amount and color  - [place] appropriate cooling/warming therapies per order  - Administer medications as ordered  - Instruct and encourage patient and family to use good hand hygiene technique  - Identify and instruct in appropriate isolation precautions for identified infection/condition  Outcome: Progressing  Goal: Absence of fever/infection during neutropenic period  Description: INTERVENTIONS:  - Monitor WBC    Outcome: Progressing     Problem: SAFETY ADULT  Goal: Patient will remain free of falls  Description: INTERVENTIONS:  - Educate patient/family on patient safety including physical limitations  - Instruct patient to call for assistance with activity   - Consult OT/PT to assist with strengthening/mobility   - Keep Call bell within reach  - Keep bed low and locked with side rails adjusted as appropriate  - Keep care items and personal belongings within reach  - Initiate and maintain comfort rounds  - Make Fall Risk Sign visible to staff  - Offer Toileting every 2 Hours, in advance of need  - Initiate/Maintain bed alarm  - Obtain necessary fall risk management equipment: yellow socks  - Apply yellow socks and bracelet for high fall risk patients  - Consider moving patient to room near nurses station  Outcome: Progressing  Goal: Maintain or return to baseline ADL function  Description: INTERVENTIONS:  -  Assess patient's ability to carry out ADLs; assess patient's baseline for ADL function and identify physical deficits which impact ability to perform ADLs (bathing, care of mouth/teeth, toileting, grooming, dressing, etc.)  - Assess/evaluate cause of self-care deficits   - Assess range of motion  - Assess patient's mobility; develop plan if impaired  - Assess patient's need for assistive devices and provide as appropriate  - Encourage maximum independence but intervene and supervise when necessary  - Involve family in performance of ADLs  - Assess for home care needs following discharge   - Consider OT consult to assist with ADL evaluation and planning for discharge  - Provide patient education as appropriate  Outcome: Progressing  Goal: Maintains/Returns to pre admission functional level  Description: INTERVENTIONS:  - Perform BMAT or MOVE assessment daily.   - Set and communicate daily mobility goal to care team and patient/family/caregiver. - Collaborate with rehabilitation services on mobility goals if consulted  - Perform Range of Motion 4 times a day. - Reposition patient every 2 hours.   - Dangle patient 3 times a day  - Stand patient 3 times a day  - Ambulate patient 3 times a day  - Out of bed to chair 3 times a day   - Out of bed for meals 3 times a day  - Out of bed for toileting  - Record patient progress and toleration of activity level   Outcome: Progressing     Problem: DISCHARGE PLANNING  Goal: Discharge to home or other facility with appropriate resources  Description: INTERVENTIONS:  - Identify barriers to discharge w/patient and caregiver  - Arrange for needed discharge resources and transportation as appropriate  - Identify discharge learning needs (meds, wound care, etc.)  - Arrange for interpretive services to assist at discharge as needed  - Refer to Case Management Department for coordinating discharge planning if the patient needs post-hospital services based on physician/advanced practitioner order or complex needs related to functional status, cognitive ability, or social support system  Outcome: Progressing     Problem: Knowledge Deficit  Goal: Patient/family/caregiver demonstrates understanding of disease process, treatment plan, medications, and discharge instructions  Description: Complete learning assessment and assess knowledge base.   Interventions:  - Provide teaching at level of understanding  - Provide teaching via preferred learning methods  Outcome: Progressing     Problem: METABOLIC, FLUID AND ELECTROLYTES - ADULT  Goal: Electrolytes maintained within normal limits  Description: INTERVENTIONS:  - Monitor labs and assess patient for signs and symptoms of electrolyte imbalances  - Administer electrolyte replacement as ordered  - Monitor response to electrolyte replacements, including repeat lab results as appropriate  - Instruct patient on fluid and nutrition as appropriate  Outcome: Progressing  Goal: Fluid balance maintained  Description: INTERVENTIONS:  - Monitor labs   - Monitor I/O and WT  - Instruct patient on fluid and nutrition as appropriate  - Assess for signs & symptoms of volume excess or deficit  Outcome: Progressing     Problem: Nutrition/Hydration-ADULT  Goal: Nutrient/Hydration intake appropriate for improving, restoring or maintaining nutritional needs  Description: Monitor and assess patient's nutrition/hydration status for malnutrition. Collaborate with interdisciplinary team and initiate plan and interventions as ordered. Monitor patient's weight and dietary intake as ordered or per policy. Utilize nutrition screening tool and intervene as necessary. Determine patient's food preferences and provide high-protein, high-caloric foods as appropriate.      INTERVENTIONS:  - Monitor oral intake, urinary output, labs, and treatment plans  - Assess nutrition and hydration status and recommend course of action  - Evaluate amount of meals eaten  - Assist patient with eating if necessary   - Allow adequate time for meals  - Recommend/ encourage appropriate diets, oral nutritional supplements, and vitamin/mineral supplements  - Order, calculate, and assess calorie counts as needed  - Recommend, monitor, and adjust tube feedings and TPN/PPN based on assessed needs  - Assess need for intravenous fluids  - Provide specific nutrition/hydration education as appropriate  - Include patient/family/caregiver in decisions related to nutrition  Outcome: Progressing     Problem: SAFETY,RESTRAINT: NV/NON-SELF DESTRUCTIVE BEHAVIOR  Goal: Remains free of harm/injury (restraint for non violent/non self-detsructive behavior)  Description: INTERVENTIONS:  - Instruct patient/family regarding restraint use   - Assess and monitor physiologic and psychological status   - Provide interventions and comfort measures to meet assessed patient needs   - Identify and implement measures to help patient regain control  - Assess readiness for release of restraint   Outcome: Progressing  Goal: Returns to optimal restraint-free functioning  Description: INTERVENTIONS:  - Assess the patient's behavior and symptoms that indicate continued need for restraint  - Identify and implement measures to help patient regain control  - Assess readiness for release of restraint   Outcome: Progressing     Problem: Prexisting or High Potential for Compromised Skin Integrity  Goal: Skin integrity is maintained or improved  Description: INTERVENTIONS:  - Identify patients at risk for skin breakdown  - Assess and monitor skin integrity  - Assess and monitor nutrition and hydration status  - Monitor labs   - Assess for incontinence   - Turn and reposition patient  - Assist with mobility/ambulation  - Relieve pressure over bony prominences  - Avoid friction and shearing  - Provide appropriate hygiene as needed including keeping skin clean and dry  - Evaluate need for skin moisturizer/barrier cream  - Collaborate with interdisciplinary team   - Patient/family teaching  - Consider wound care consult   Outcome: Progressing

## 2023-08-26 NOTE — PLAN OF CARE
Patient presents for a 3.5 hour HD session on a 4K2.25Ca bath for a serum potassium of 3.9 mmol/L drawn on 8/25/23 with a net UF goal of 1 L as tolerated per discussion with Dr. Pedro Valdovinos. Post-Dialysis RN Treatment Note    Blood Pressure:  Pre 141/60 mm/Hg  Post 147/66 mmHg   EDW  TBD kg    Weight:  Pre 107.1 kg   Post 107.1 kg   Mode of weight measurement: Bed Scale   Volume Removed  0 ml    Treatment duration 135 minutes    NS given  No    Treatment shortened? Yes, describe:  Tx was orginally shortened to 3 hours and then ended earlier due to the system clotting per Dr. Pedro Valdovinos   Medications given during Rx None Reported   Estimated Kt/V  Not Applicable   Access type: Permacath/TDC   Access Issues: No    Report called to primary nurse   Yes, Frank Sheth RN     Problem: METABOLIC, FLUID AND ELECTROLYTES - ADULT  Goal: Electrolytes maintained within normal limits  Description: INTERVENTIONS:  - Monitor labs and assess patient for signs and symptoms of electrolyte imbalances  - Administer electrolyte replacement as ordered  - Monitor response to electrolyte replacements, including repeat lab results as appropriate  - Instruct patient on fluid and nutrition as appropriate  Outcome: Progressing  Goal: Fluid balance maintained  Description: INTERVENTIONS:  - Monitor labs   - Monitor I/O and WT  - Instruct patient on fluid and nutrition as appropriate  - Assess for signs & symptoms of volume excess or deficit  Outcome: Progressing

## 2023-08-26 NOTE — PROGRESS NOTES
[id number] [facility]  Progress Note  Name: Edouard Smiley  MRN: [id number]  Unit/Bed#: 913 [facility] 421-01 I Date of Admission: 8/19/2023   Date of Service: 8/26/2023 I Hospital Day: 7    Assessment/Plan   * Polymicrobial bacterial infection  Assessment & Plan  Polymicrobial bacteremia.  Enterobacter/Proteus/Streptococcus/Granulicatella  Suspect cause of patient's initial metabolic encephalopathy which has improved with abx treatment  ID following  On cefepime and flagyl  Afebrile  Monitor fever curve and cbc      Diabetic ulcer of left midfoot associated with type 2 diabetes mellitus, limited to breakdown of skin [facility])  Assessment & Plan  Lab Results   Component Value Date    HGBA1C 6.6 (H) 07/07/2022       Recent Labs     08/25/23  1646 08/25/23  2133 08/26/23  0719 08/26/23  1129   POCGLU 150* 181* 101 84       Blood Sugar Average: Last 72 hrs:  (P) 153.4210147662603821     Podiatry consulted for Delgadillo grade 1 ulcer submetatarsal 1 left foot. S/P Debridement  Noted to have elevated CRP, procalcitonin,  1 of 2 blood cultures growing G- rods and G+ cocci in chains  8/22 Started vanc cefepime and flagyl  8/23 ID switched abx to cefepime and flagyl, will continue  F/U final blood and wound cultures  MRI foot First submetatarsal head plantar skin ulceration with suspected draining sinus tract extending to the first metatarsal phalangeal joint suspicious for septic arthropathy  8/24 s/p I&D by podiatry  Vascular consulted as imaging showing lower limb diffuse atherosclerotic disease noted throughout with elevated PSV of proximal popliteal artery and tibial peroneal trunk suggesting less than 50% stenosis, metatarsal pressure greater than 200, great toe pressure 95 mmHg within the healing range on right side.   Left lower limb showed diffuse atherosclerotic disease noted throughout with a 50 to 75% stenosis of the proximal popliteal artery, metatarsal pressure 170, great toe pressure 98 mmHg within healing range. Plan for angiogram with IR next week as per discussion with vascular      Status post fall  Assessment & Plan  Patient sustained a fall about 2 days ago  Etiology not clear-EKG was unremarkable. Blood sugar was 157. Questionable hypoglycemia versus accidental fall. Patient not exhibiting any signs of stroke. Telemetry showed no evidence of arrhythmia  Patient does not remember how he fell. Patient was found on the floor and could not get up. We will get PT/OT evaluation and treatment  CAT scan of the brain, CT chest abdomen pelvis, cervical spine and lumbar reconstructions study was ordered in the ED but patient refused the studies  Patient got CT of the head and neck after receiving Ativan which was unremarkable. Fall precautions    ESRD (end stage renal disease) [facility])  Assessment & Plan  Lab Results   Component Value Date    EGFR 7 08/26/2023    EGFR 5 08/25/2023    EGFR 5 08/23/2023    CREATININE 6.71 (H) 08/26/2023    CREATININE 8.56 (H) 08/25/2023    CREATININE 9.26 (H) 08/23/2023   Patient is on Tuesday Thursday Saturday schedule at [facility]    Temporary HD cath removed IR, temporary  catheter placed 8/25  Neurology following, patient had dialysis today; tolerated well     Type 2 diabetes mellitus, without long-term current use of insulin [facility])  Assessment & Plan  Lab Results   Component Value Date    HGBA1C 6.6 (H) 07/07/2022       Recent Labs     08/25/23  1646 08/25/23  2133 08/26/23  0719 08/26/23  1129   POCGLU 150* 181* 101 84       Blood Sugar Average: Last 72 hrs:  (P) 153.4405371524894473   Patient is on linagliptin at home. Continue Humalog sliding scale with Accu-Cheks before every meal and at bedtime.   Patient has been a diabetic diet  lantus 15 lispro 5 units     Hypertension  Assessment & Plan  Continue Procardia 30 mg p.o. twice daily, Coreg 12.5 mg p.o. twice daily  Vital signs as per unit routine    Subacute osteomyelitis of right foot [facility])  Assessment & Plan  Podiatry following  Vascular consulted, see plan as noted above    Altered mental status  Assessment & Plan  Resolved  Patient had been confused and more agitated and not following commands and refusing all treatment previous day; at baseline currently   UDS negative   Neurology consult pending at the current time-based on further course might need to consider MRI of the brain 2. Patient has known history of TIAs  CT of the head and neck with no acute intracranial abnormality with tiny hypodensity in the right posterior inferior putamen likely chronic lacunar infarct dilated prevascular space. Negative CTA for large vessel occlusion dissection or aneurysm with possible tracheobronchomalacia  Discussed with patient's friend-no history of alcohol use or drug use  Ammonia and TSH level were normal  Psychiatry was consulted-recommending Haldol 2 mg p.o. every 4 hours as needed for agitation at 2 mg IM as needed for severe agitation. Started septic work-up also as patient noted to have deep left foot wound-patient has been afebrile and had white count initially which has since normalized. Suspect most symptoms 2/2 infection see below  B12, folate, TSH, ammonia WNL  Mentation appears to be improved; at baseline     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 mellitus, with necrosis of bone [facility])  Assessment & Plan  Lab Results   Component Value Date    HGBA1C 6.6 (H) 07/07/2022       Recent Labs     08/25/23  1646 08/25/23  2133 08/26/23  0719 08/26/23  1129   POCGLU 150* 181* 101 84       Blood Sugar Average: Last 72 hrs:  (P) 153.8267238808276512               VTE Pharmacologic Prophylaxis: VTE Score: 2 Moderate Risk (Score 3-4) - Pharmacological DVT Prophylaxis Ordered: heparin. Patient Centered Rounds: I performed bedside rounds with nursing staff today.   Discussions with Specialists or Other Care Team Provider: case management     Education and Discussions with Family / Patient: patient indicated he would call his daughters . Total Time Spent on Date of Encounter in care of patient: 45 minutes This time was spent on one or more of the following: performing physical exam; counseling and coordination of care; obtaining or reviewing history; documenting in the medical record; reviewing/ordering tests, medications or procedures; communicating with other healthcare professionals and discussing with patient's family/caregivers. Current Length of Stay: 7 day(s)  Current Patient Status: Inpatient   Certification Statement: The patient will continue to require additional inpatient hospital stay due to IV abx, angiogram, repeat BC, possible surgery with podiatry  Discharge Plan: Anticipate discharge in >72 hrs to discharge location to be determined pending rehab evaluations. Code Status: Level 1 - Full Code    Subjective:   Patient seen sitting up on edge of bed after dialysis; feels tired but good. Fair appetite, no nausea or vomiting. Objective:     Vitals:   Temp (24hrs), Av.7 °F (36.5 °C), Min:97.3 °F (36.3 °C), Max:97.9 °F (36.6 °C)    Temp:  [97.3 °F (36.3 °C)-97.9 °F (36.6 °C)] 97.6 °F (36.4 °C)  HR:  [58-76] 61  Resp:  [15-18] 18  BP: ()/(43-77) 91/50  SpO2:  [94 %-98 %] 94 %  Body mass index is 33.38 kg/m². Input and Output Summary (last 24 hours): Intake/Output Summary (Last 24 hours) at 2023 1539  Last data filed at 2023 1115  Gross per 24 hour   Intake 1570 ml   Output 3765 ml   Net -2195 ml       Physical Exam:   Physical Exam  Vitals and nursing note reviewed. HENT:      Head: Normocephalic. Nose: Nose normal.      Mouth/Throat:      Mouth: Mucous membranes are moist.   Eyes:      Extraocular Movements: Extraocular movements intact. Conjunctiva/sclera: Conjunctivae normal.      Pupils: Pupils are equal, round, and reactive to light. Cardiovascular:      Rate and Rhythm: Normal rate and regular rhythm. Pulses: Normal pulses.       Heart sounds: Normal heart sounds. Pulmonary:      Effort: Pulmonary effort is normal.      Breath sounds: Normal breath sounds. Abdominal:      General: Bowel sounds are normal. There is no distension. Palpations: Abdomen is soft. Tenderness: There is no abdominal tenderness. Genitourinary:     Comments: Voiding spontaneously   Musculoskeletal:      Cervical back: Normal range of motion. Comments: Right foot dressing present; small amount of strike through drainage note on plantar aspect. Skin:     General: Skin is warm. Capillary Refill: Capillary refill takes less than 2 seconds. Comments: Bilateral LE venous stasis discoloration with multiple scabbed scratches noted. Neurological:      General: No focal deficit present. Mental Status: He is alert and oriented to person, place, and time. Psychiatric:         Mood and Affect: Mood normal.         Behavior: Behavior normal.         Thought Content: Thought content normal.         Judgment: Judgment normal.        Additional Data:     Labs:  Results from last 7 days   Lab Units 08/26/23  0855 08/23/23  0414 08/22/23  0516   WBC Thousand/uL 10.55*   < > 8.06   HEMOGLOBIN g/dL 10.3*   < > 11.6*   HEMATOCRIT % 31.9*   < > 36.2*   PLATELETS Thousands/uL 277   < > 230   NEUTROS PCT %  --   --  74   LYMPHS PCT %  --   --  12*   MONOS PCT %  --   --  10   EOS PCT %  --   --  2    < > = values in this interval not displayed.      Results from last 7 days   Lab Units 08/26/23  0855   SODIUM mmol/L 130*   POTASSIUM mmol/L 3.8   CHLORIDE mmol/L 93*   CO2 mmol/L 27   BUN mg/dL 40*   CREATININE mg/dL 6.71*   ANION GAP mmol/L 10   CALCIUM mg/dL 8.0*   ALBUMIN g/dL 3.3*   GLUCOSE RANDOM mg/dL 100         Results from last 7 days   Lab Units 08/26/23  1129 08/26/23  0719 08/25/23  2133 08/25/23  1646 08/25/23  1127 08/25/23  0731 08/24/23  2054 08/24/23  1712 08/24/23  1130 08/24/23  0719 08/23/23  2103 08/23/23  1624   POC GLUCOSE mg/dl 84 101 181* 150* 137 114 150* 211* 148* 118 138 183*         Results from last 7 days   Lab Units 08/22/23  1053   PROCALCITONIN ng/ml 112.62*       Lines/Drains:  Invasive Devices     Peripheral Intravenous Line  Duration           Peripheral IV 08/23/23 Left;Ventral (anterior) Forearm 3 days          Hemodialysis Catheter  Duration           HD Temporary Double Catheter 1 day                      Imaging: No pertinent imaging reviewed. Recent Cultures (last 7 days):   Results from last 7 days   Lab Units 08/24/23  1748 08/24/23  0618 08/21/23  1756 08/21/23  1627 08/21/23  1423   BLOOD CULTURE   --  No Growth at 48 hrs. No Growth at 48 hrs.  --  Enterobacter cloacae*  Granulicatella adiacens*  Proteus vulgaris*  Alpha Hemolytic Streptococcus*  Bacteroides fragilis* No Growth After 4 Days.    GRAM STAIN RESULT  Rare Polys  No bacteria seen  1+ Polys*  1+ Gram positive cocci in pairs*  --  1+ Polys*  1+ Gram positive cocci in pairs*  1+ Gram negative rods* Gram negative rods*  Gram positive cocci in chains*  --    WOUND CULTURE  1+ Growth of Proteus vulgaris group*  1+ Growth of Enterobacter cloacae*  1+ Growth of Beta Hemolytic Streptococcus Not Group A, B, C, F or G*  --  1+ Growth of Pseudomonas aeruginosa*  2+ Growth of Enterobacter cloacae*  2+ Growth of  --   --        Last 24 Hours Medication List:   Current Facility-Administered Medications   Medication Dose Route Frequency Provider Last Rate   • acetaminophen  650 mg Oral Q6H PRN Joel Dalton MD     • acetaminophen  975 mg Oral Q8H Joel Dalton MD     • atorvastatin  80 mg Oral Daily Joel Dalton MD     • carvedilol  12.5 mg Oral BID Manish Romero MD     • cefepime  1,000 mg Intravenous Q24H Jaren Méndez DO 1,000 mg (08/25/23 1918)   • famotidine  10 mg Oral Daily Joel Dalton MD     • gabapentin  200 mg Oral BID Jaren Méndez DO     • haloperidol lactate  2 mg Intramuscular Q6H PRN Jaren Méndez DO     • heparin (porcine) 5,000 Units Subcutaneous Q8H [address] Davi Chen MD     • HYDROmorphone  1 mg Intravenous Q4H PRN Joel Dalton MD     • insulin glargine  15 Units Subcutaneous HS Jaren Méndez DO     • insulin lispro  1-5 Units Subcutaneous HS Davi Chen MD     • insulin lispro  2-12 Units Subcutaneous TID AC Davi Chen MD     • insulin lispro  5 Units Subcutaneous TID With Meals Jaren Méndez DO     • lidocaine  1 patch Topical Daily Davi Chen MD     • LORazepam  0.5 mg Intravenous Q6H PRN Davi Chen MD     • methocarbamol  500 mg Oral Q12H PRN Davi Chen MD     • metroNIDAZOLE  500 mg Oral Q8H [address] Jaren Méndez, DO     • NIFEdipine  30 mg Oral BID Davi Chen MD     • ondansetron  4 mg Intravenous Q6H PRN Davi Chen MD     • oxyCODONE  5 mg Oral Q8H PRN Davi Chen MD     • senna-docusate sodium  2 tablet Oral BID Jaren Méndez DO     • sevelamer  800 mg Oral TID With Meals Davi Chen MD          Today, Patient Was Seen By: GRANT Mcgee    **Please Note: This note may have been constructed using a voice recognition system. **

## 2023-08-26 NOTE — CONSULTS
[id number] [facility]  Consult  Name: Melba West 58 y.o. male I MRN: [id number]  Unit/Bed#: 4 [address] I Date of Admission: 8/19/2023   Date of Service: 8/26/2023 I Hospital Day: 7    Inpatient consult to Vascular Surgery  Consult performed by: GRANT Boland  Consult ordered by: GRANT Small        Assessment/Plan   Subacute osteomyelitis of right foot [facility])  Assessment & Plan  Patient is a 58year old male, nonsmoker, with PMH type II DM, HTN, TIA, CHF and ESRD on HD. Patient presented to [facility] Emergency Department on 8/19/23 s/p unwitnessed fall. Patient could not recall why or how he fell. He is currently undergoing evaluation by neurology and psychiatry due to agitation and intermittent confusion. Patient is also currently undergoing sepsis workup due to findings of nonhealing wounds to bilateral feet. Patient underwent MRI of right foot with findings suspicios for septic arthropathy including marrow changes involving both the tibial and fibular sesamoid bones consistent with osteomyelitis. Podiatry was consulted and wound cultures were obtained. Vascular surgery is consulted for input regarding results of LEAD obtained 8/24/23. Patient denies any symptoms of ischemia prior to hospitalization. He reports that he was ambulating independently without assistive device. He denies any claudication, rest pain, color/temperature change, or previous poorly healing wounds. Patient does report chronic neuropathy related to his diabetes that recently started taking gabapentin for. He also notes swelling to bilateral lower extremities, as well as skin darkening to bilateral shins. Recommendations:  -LEAD revealed diffuse atherosclerotic disease throughout bilateral lower extremities with noncompressible vessels and <50% stenosis of the right proximal popliteal artery and tibioperoneal trunk.  Great toe pressures were found to be >90 bilaterally, however may be falsely elevated due to noncompressible vessels.  -Patient may benefit from RLE angiogram with possible intervention. Consult placed to IR.  -Palpable PT and femoral pulses bilaterally with multiphasic left DP signal and monophasic right DP signal  -Wound culture obtained 8/21/23 positive for pseudomonas aeruginosa and enterobacter. Patient is s/p bedside debridement down to bone on 8/24. Bone culture was obtained. Appreciate infectious disease recommendations regarding antibiotics  -Recommend medical optimization with aspirin 81 mg and atorvastatin. -Management of comorbidities per SLIM. -Will d/w Dr. Manpreet Linton      Review of Systems:  General: negative for - chills or fever  Cardiovascular: no chest pain or dyspnea on exertion  Respiratory: no cough, shortness of breath, or wheezing  Gastrointestinal: no abdominal pain, change in bowel habits, or black or bloody stools  Musculoskeletal ROS: negative for - gait disturbance or muscle pain  Dermatological ROS: positive for dry skin, wounds    Past Medical History:  Past Medical History:   Diagnosis Date   • CKD    • Diabetes mellitus ([address])    • Hypertension    • Left toe amputee ([address])    • TIA (transient ischemic attack)        Past Surgical History:  Past Surgical History:   Procedure Laterality Date   • IR TEMPORARY DIALYSIS CATHETER PLACEMENT  8/25/2023   • IR TUNNELED CENTRAL LINE REMOVAL  8/23/2023   • IR TUNNELED DIALYSIS CATHETER PLACEMENT  1/27/2022       Social History:  Social History     Substance and Sexual Activity   Alcohol Use Not Currently   • Alcohol/week: 0.0 standard drinks of alcohol    Comment: 0     Social History     Substance and Sexual Activity   Drug Use Never     Social History     Tobacco Use   Smoking Status Never   • Passive exposure: Never   Smokeless Tobacco Never       Family History:  History reviewed. No pertinent family history.     Allergies:  No Known Allergies    Medications:  Current Facility-Administered Medications   Medication Dose Route Frequency   • acetaminophen (TYLENOL) tablet 650 mg  650 mg Oral Q6H PRN   • acetaminophen (TYLENOL) tablet 975 mg  975 mg Oral Q8H   • atorvastatin (LIPITOR) tablet 80 mg  80 mg Oral Daily   • carvedilol (COREG) tablet 12.5 mg  12.5 mg Oral BID   • cefepime (MAXIPIME) IVPB (premix in dextrose) 1,000 mg 50 mL  1,000 mg Intravenous Q24H   • famotidine (PEPCID) tablet 10 mg  10 mg Oral Daily   • gabapentin (NEURONTIN) oral solution 200 mg  200 mg Oral BID   • haloperidol lactate (HALDOL) injection 2 mg  2 mg Intramuscular Q6H PRN   • heparin (porcine) subcutaneous injection 5,000 Units  5,000 Units Subcutaneous Q8H [facility]   • HYDROmorphone (DILAUDID) injection 1 mg  1 mg Intravenous Q4H PRN   • insulin glargine (LANTUS) subcutaneous injection 15 Units 0.15 mL  15 Units Subcutaneous HS   • insulin lispro (HumaLOG) 100 units/mL subcutaneous injection 1-5 Units  1-5 Units Subcutaneous HS   • insulin lispro (HumaLOG) 100 units/mL subcutaneous injection 2-12 Units  2-12 Units Subcutaneous TID AC   • insulin lispro (HumaLOG) 100 units/mL subcutaneous injection 5 Units  5 Units Subcutaneous TID With Meals   • lidocaine (LIDODERM) 5 % patch 1 patch  1 patch Topical Daily   • LORazepam (ATIVAN) injection 0.5 mg  0.5 mg Intravenous Q6H PRN   • methocarbamol (ROBAXIN) tablet 500 mg  500 mg Oral Q12H PRN   • metroNIDAZOLE (FLAGYL) tablet 500 mg  500 mg Oral Q8H [facility]   • NIFEdipine (PROCARDIA XL) 24 hr tablet 30 mg  30 mg Oral BID   • ondansetron (ZOFRAN) injection 4 mg  4 mg Intravenous Q6H PRN   • oxyCODONE (ROXICODONE) IR tablet 5 mg  5 mg Oral Q8H PRN   • senna-docusate sodium (SENOKOT S) 8.6-50 mg per tablet 2 tablet  2 tablet Oral BID   • sevelamer (RENAGEL) tablet 800 mg  800 mg Oral TID With Meals       Vitals:  Vitals:    08/26/23 0900   BP: 141/60   Pulse: 58   Resp: 18   Temp: (!) 97.3 °F (36.3 °C)   SpO2: 98%       I/Os:  I/O last 3 completed shifts: In: 1270 [P.O.:750;  I.V.:520]  Out: 5014 [Urine:900; Other:2565]  I/O this shift:  In: 200 [I.V.:200]  Out: 150 [Urine:150]    Lab Results and Cultures:   CBC with diff:   Lab Results   Component Value Date    WBC 10.55 (H) 08/26/2023    HGB 10.3 (L) 08/26/2023    HCT 31.9 (L) 08/26/2023    MCV 90 08/26/2023     08/26/2023    RBC 3.53 (L) 08/26/2023    MCH 29.2 08/26/2023    MCHC 32.3 08/26/2023    RDW 16.8 (H) 08/26/2023    MPV 9.8 08/26/2023    NRBC 0 08/22/2023   ,   BMP/CMP:  Lab Results   Component Value Date    K 3.9 08/25/2023    CL 93 (L) 08/25/2023    CO2 22 08/25/2023    BUN 60 (H) 08/25/2023    CREATININE 8.56 (H) 08/25/2023    CALCIUM 8.0 (L) 08/25/2023    AST 35 08/19/2023    ALT 14 08/19/2023    ALKPHOS 69 08/19/2023    EGFR 5 08/25/2023   ,   Lipid Panel: No results found for: "CHOL",   Coags:   Lab Results   Component Value Date    PTT 33 08/19/2023    INR 1.16 08/19/2023   ,     Blood Culture:   Lab Results   Component Value Date    BLOODCX No Growth at 24 hrs. 08/24/2023    BLOODCX No Growth at 24 hrs. 08/24/2023   ,   Urinalysis:   Lab Results   Component Value Date    COLORU Yellow 08/24/2023    CLARITYU Clear 08/24/2023    SPECGRAV 1.010 08/24/2023    PHUR 7.0 08/24/2023    LEUKOCYTESUR Negative 08/24/2023    NITRITE Negative 08/24/2023    GLUCOSEU 100 (1/10%) (A) 08/24/2023    KETONESU Negative 08/24/2023    BILIRUBINUR Negative 08/24/2023    BLOODU Small (A) 08/24/2023   ,   Urine Culture: No results found for: "URINECX",   Wound Culure:   Lab Results   Component Value Date    WOUNDCULT 1+ Growth of Proteus vulgaris group (A) 08/24/2023    WOUNDCULT 1+ Growth of Enterobacter cloacae (A) 08/24/2023       Imaging:  LEAD 8/24/23  Diffuse atherosclerotic disease noted throughout with <50% stenosis of the right proximal popliteal artery and tibioperoneal trunk. 50-75% stenosis of the left proximal popliteal artery.   R SALVATORE -/>200/95 L SALVATORE -/170/98    MRI Right foot 8/23/23  IMPRESSION:  First submetatarsal head plantar skin ulceration with suspected draining sinus tract extending to the first metatarsal phalangeal joint. Findings raise suspicion for septic arthropathy including marrow changes involving both the tibial and fibular sesamoid bones consistent with osteomyelitis. Minimal marrow changes across the first metatarsal phalangeal joint at this time. Physical Exam:    General appearance: alert and oriented, in no acute distress  Head: Normocephalic, without obvious abnormality, atraumatic  Neck: no adenopathy, no JVD and supple, symmetrical, trachea midline  Lungs: clear to auscultation bilaterally  Heart: regular rate and rhythm, S1, S2 normal, no murmur, click, rub or gallop  Abdomen: soft, non-tender; bowel sounds normal; no masses,  no organomegaly  Extremities: extremities normal, warm and well-perfused; no cyanosis, clubbing, or edema and venous stasis dermatitis noted  Skin: venous stasis discoloration bilateral lower extremities, several dry scabbed areas.  Right foot wrapped, left hallux wound  Neurologic: Grossly normal    Wound/Incision:   left foot      right foot        Pulse exam:  Radial: Right: 2+ Left[de-identified] 2+  Femoral: Right: 1+ Left: 2+  Popliteal: Right: 2+ Left: 2+  DP: Right: doppler signal Left: doppler signal  PT: Right: 1+ Left: 1+    GRANT Long  8/26/2023

## 2023-08-26 NOTE — ASSESSMENT & PLAN NOTE
Lab Results   Component Value Date    EGFR 7 08/26/2023    EGFR 5 08/25/2023    EGFR 5 08/23/2023    CREATININE 6.71 (H) 08/26/2023    CREATININE 8.56 (H) 08/25/2023    CREATININE 9.26 (H) 08/23/2023   Patient is on Tuesday Thursday Saturday schedule at [facility]    Temporary HD cath removed IR, temporary  catheter placed 8/25  Neurology following, patient had dialysis today; tolerated well

## 2023-08-26 NOTE — ASSESSMENT & PLAN NOTE
Polymicrobial bacteremia.  Enterobacter/Proteus/Streptococcus/Granulicatella  Suspect cause of patient's initial metabolic encephalopathy which has improved with abx treatment  ID following  On cefepime and flagyl  Afebrile  Monitor fever curve and cbc

## 2023-08-26 NOTE — ASSESSMENT & PLAN NOTE
Lab Results   Component Value Date    HGBA1C 6.6 (H) 07/07/2022       Recent Labs     08/25/23  1646 08/25/23  2133 08/26/23  0719 08/26/23  1129   POCGLU 150* 181* 101 84       Blood Sugar Average: Last 72 hrs:  (P) [id number]     Podiatry consulted for Delgadillo grade 1 ulcer submetatarsal 1 left foot. S/P Debridement  Noted to have elevated CRP, procalcitonin,  1 of 2 blood cultures growing G- rods and G+ cocci in chains  8/22 Started vanc cefepime and flagyl  8/23 ID switched abx to cefepime and flagyl, will continue  F/U final blood and wound cultures  MRI foot First submetatarsal head plantar skin ulceration with suspected draining sinus tract extending to the first metatarsal phalangeal joint suspicious for septic arthropathy  8/24 s/p I&D by podiatry  Vascular consulted as imaging showing lower limb diffuse atherosclerotic disease noted throughout with elevated PSV of proximal popliteal artery and tibial peroneal trunk suggesting less than 50% stenosis, metatarsal pressure greater than 200, great toe pressure 95 mmHg within the healing range on right side. Left lower limb showed diffuse atherosclerotic disease noted throughout with a 50 to 75% stenosis of the proximal popliteal artery, metatarsal pressure 170, great toe pressure 98 mmHg within healing range.   Plan for angiogram with IR next week as per discussion with vascular

## 2023-08-26 NOTE — ASSESSMENT & PLAN NOTE
Patient is a 58year old male, nonsmoker, with PMH type II DM, HTN, TIA, CHF and ESRD on HD. Patient presented to [facility] Emergency Department on 8/19/23 s/p unwitnessed fall. Patient could not recall why or how he fell. He is currently undergoing evaluation by neurology and psychiatry due to agitation and intermittent confusion. Patient is also currently undergoing sepsis workup due to findings of nonhealing wounds to bilateral feet. Patient underwent MRI of right foot with findings suspicios for septic arthropathy including marrow changes involving both the tibial and fibular sesamoid bones consistent with osteomyelitis. Podiatry was consulted and wound cultures were obtained. Vascular surgery is consulted for input regarding results of LEAD obtained 8/24/23. Patient denies any symptoms of ischemia prior to hospitalization. He reports that he was ambulating independently without assistive device. He denies any claudication, rest pain, color/temperature change, or previous poorly healing wounds. Patient does report chronic neuropathy related to his diabetes that recently started taking gabapentin for. He also notes swelling to bilateral lower extremities, as well as skin darkening to bilateral shins. Recommendations:  -LEAD revealed diffuse atherosclerotic disease throughout bilateral lower extremities with noncompressible vessels and <50% stenosis of the right proximal popliteal artery and tibioperoneal trunk. Great toe pressures were found to be >90 bilaterally, however may be falsely elevated due to noncompressible vessels.  -Patient may benefit from RLE angiogram with possible intervention. Consult placed to IR.  -Palpable PT and femoral pulses bilaterally with multiphasic left DP signal and monophasic right DP signal  -Wound culture obtained 8/21/23 positive for pseudomonas aeruginosa and enterobacter. Patient is s/p bedside debridement down to bone on 8/24. Bone culture was obtained.  Appreciate infectious disease recommendations regarding antibiotics  -Recommend medical optimization with aspirin 81 mg and atorvastatin. -Management of comorbidities per SLIM.   -Will d/w Dr. Neema Mulligan

## 2023-08-26 NOTE — ASSESSMENT & PLAN NOTE
Continue Procardia 30 mg p.o. twice daily, Coreg 12.5 mg p.o. twice daily  Vital signs as per unit routine

## 2023-08-27 LAB
ALBUMIN SERPL BCP-MCNC: 3.1 G/DL (ref 3.5–5)
ANION GAP SERPL CALCULATED.3IONS-SCNC: 9 MMOL/L
B FRAGILIS DNA BLD POS QL NAA+NON-PROBE: DETECTED
BACTERIA BLD CULT: ABNORMAL
BUN SERPL-MCNC: 36 MG/DL (ref 5–25)
CALCIUM ALBUM COR SERPL-MCNC: 8.5 MG/DL (ref 8.3–10.1)
CALCIUM SERPL-MCNC: 7.8 MG/DL (ref 8.4–10.2)
CHLORIDE SERPL-SCNC: 95 MMOL/L (ref 96–108)
CO2 SERPL-SCNC: 27 MMOL/L (ref 21–32)
CREAT SERPL-MCNC: 5.84 MG/DL (ref 0.6–1.3)
E CLOAC COMP DNA BLD POS NAA+NON-PROBE: DETECTED
ERYTHROCYTE [DISTWIDTH] IN BLOOD BY AUTOMATED COUNT: 16.9 % (ref 11.6–15.1)
GFR SERPL CREATININE-BSD FRML MDRD: 9 ML/MIN/1.73SQ M
GLUCOSE SERPL-MCNC: 138 MG/DL (ref 65–140)
GLUCOSE SERPL-MCNC: 140 MG/DL (ref 65–140)
GLUCOSE SERPL-MCNC: 141 MG/DL (ref 65–140)
GLUCOSE SERPL-MCNC: 146 MG/DL (ref 65–140)
GLUCOSE SERPL-MCNC: 199 MG/DL (ref 65–140)
GRAM STN SPEC: ABNORMAL
GRAM STN SPEC: ABNORMAL
HCT VFR BLD AUTO: 31.4 % (ref 36.5–49.3)
HGB BLD-MCNC: 9.9 G/DL (ref 12–17)
MAGNESIUM SERPL-MCNC: 2.2 MG/DL (ref 1.9–2.7)
MCH RBC QN AUTO: 29.1 PG (ref 26.8–34.3)
MCHC RBC AUTO-ENTMCNC: 31.5 G/DL (ref 31.4–37.4)
MCV RBC AUTO: 92 FL (ref 82–98)
PHOSPHATE SERPL-MCNC: 2.7 MG/DL (ref 2.3–4.1)
PLATELET # BLD AUTO: 290 THOUSANDS/UL (ref 149–390)
PMV BLD AUTO: 10.2 FL (ref 8.9–12.7)
POTASSIUM SERPL-SCNC: 3.8 MMOL/L (ref 3.5–5.3)
PROTEUS SP DNA BLD POS QL NAA+NON-PROBE: DETECTED
RBC # BLD AUTO: 3.4 MILLION/UL (ref 3.88–5.62)
SODIUM SERPL-SCNC: 131 MMOL/L (ref 135–147)
STREPTOCOCCUS DNA BLD POS NAA+NON-PROBE: DETECTED
WBC # BLD AUTO: 10.02 THOUSAND/UL (ref 4.31–10.16)

## 2023-08-27 PROCEDURE — NC001 PR NO CHARGE: Performed by: RADIOLOGY

## 2023-08-27 PROCEDURE — 99232 SBSQ HOSP IP/OBS MODERATE 35: CPT | Performed by: NURSE PRACTITIONER

## 2023-08-27 PROCEDURE — 80069 RENAL FUNCTION PANEL: CPT | Performed by: STUDENT IN AN ORGANIZED HEALTH CARE EDUCATION/TRAINING PROGRAM

## 2023-08-27 PROCEDURE — 85027 COMPLETE CBC AUTOMATED: CPT | Performed by: STUDENT IN AN ORGANIZED HEALTH CARE EDUCATION/TRAINING PROGRAM

## 2023-08-27 PROCEDURE — 82948 REAGENT STRIP/BLOOD GLUCOSE: CPT

## 2023-08-27 PROCEDURE — 83735 ASSAY OF MAGNESIUM: CPT | Performed by: STUDENT IN AN ORGANIZED HEALTH CARE EDUCATION/TRAINING PROGRAM

## 2023-08-27 PROCEDURE — 99232 SBSQ HOSP IP/OBS MODERATE 35: CPT | Performed by: PODIATRIST

## 2023-08-27 RX ADMIN — CEFEPIME HYDROCHLORIDE 1000 MG: 1 INJECTION, SOLUTION INTRAVENOUS at 19:49

## 2023-08-27 RX ADMIN — METRONIDAZOLE 500 MG: 500 TABLET ORAL at 02:08

## 2023-08-27 RX ADMIN — SEVELAMER HYDROCHLORIDE 800 MG: 800 TABLET ORAL at 12:08

## 2023-08-27 RX ADMIN — HEPARIN SODIUM 5000 UNITS: 5000 INJECTION INTRAVENOUS; SUBCUTANEOUS at 05:02

## 2023-08-27 RX ADMIN — METRONIDAZOLE 500 MG: 500 TABLET ORAL at 18:07

## 2023-08-27 RX ADMIN — NIFEDIPINE 30 MG: 30 TABLET, EXTENDED RELEASE ORAL at 08:35

## 2023-08-27 RX ADMIN — INSULIN LISPRO 5 UNITS: 100 INJECTION, SOLUTION INTRAVENOUS; SUBCUTANEOUS at 12:09

## 2023-08-27 RX ADMIN — INSULIN LISPRO 5 UNITS: 100 INJECTION, SOLUTION INTRAVENOUS; SUBCUTANEOUS at 18:06

## 2023-08-27 RX ADMIN — INSULIN LISPRO 5 UNITS: 100 INJECTION, SOLUTION INTRAVENOUS; SUBCUTANEOUS at 08:33

## 2023-08-27 RX ADMIN — SENNOSIDES AND DOCUSATE SODIUM 2 TABLET: 50; 8.6 TABLET ORAL at 18:07

## 2023-08-27 RX ADMIN — GABAPENTIN 200 MG: 250 SUSPENSION ORAL at 18:12

## 2023-08-27 RX ADMIN — METRONIDAZOLE 500 MG: 500 TABLET ORAL at 09:47

## 2023-08-27 RX ADMIN — INSULIN GLARGINE 15 UNITS: 100 INJECTION, SOLUTION SUBCUTANEOUS at 21:29

## 2023-08-27 RX ADMIN — SEVELAMER HYDROCHLORIDE 800 MG: 800 TABLET ORAL at 16:08

## 2023-08-27 RX ADMIN — GABAPENTIN 200 MG: 250 SUSPENSION ORAL at 09:45

## 2023-08-27 RX ADMIN — SENNOSIDES AND DOCUSATE SODIUM 2 TABLET: 50; 8.6 TABLET ORAL at 08:36

## 2023-08-27 RX ADMIN — HEPARIN SODIUM 5000 UNITS: 5000 INJECTION INTRAVENOUS; SUBCUTANEOUS at 21:29

## 2023-08-27 RX ADMIN — ATORVASTATIN CALCIUM 80 MG: 80 TABLET, FILM COATED ORAL at 08:36

## 2023-08-27 RX ADMIN — HEPARIN SODIUM 5000 UNITS: 5000 INJECTION INTRAVENOUS; SUBCUTANEOUS at 13:35

## 2023-08-27 RX ADMIN — SEVELAMER HYDROCHLORIDE 800 MG: 800 TABLET ORAL at 07:59

## 2023-08-27 RX ADMIN — CARVEDILOL 12.5 MG: 12.5 TABLET, FILM COATED ORAL at 08:36

## 2023-08-27 RX ADMIN — ACETAMINOPHEN 975 MG: 325 TABLET ORAL at 02:07

## 2023-08-27 RX ADMIN — FAMOTIDINE 10 MG: 20 TABLET, FILM COATED ORAL at 08:34

## 2023-08-27 RX ADMIN — INSULIN LISPRO 1 UNITS: 100 INJECTION, SOLUTION INTRAVENOUS; SUBCUTANEOUS at 21:30

## 2023-08-27 RX ADMIN — CARVEDILOL 12.5 MG: 12.5 TABLET, FILM COATED ORAL at 18:07

## 2023-08-27 RX ADMIN — NIFEDIPINE 30 MG: 30 TABLET, EXTENDED RELEASE ORAL at 18:07

## 2023-08-27 NOTE — PROGRESS NOTES
Progress Note - Podiatry  Marjorie Franco 58 y.o. male MRN: [id number]  Unit/Bed#: [address] [place] [address] Encounter: [id number]    Assessment:  Jessenia Philippe grade 3 ulcer submetatarsal 1, plantar aspect right foot. Probable contiguous osteomyelitis right first MPJ. Resolving cellulitis right foot. Diabetic neuropathy. Acquired deformity of foot. Peripheral artery disease right greater than left. History of lesser toe amputation. Plan:  Chart reviewed. Patient seen at bedside. At this time patient is to undergo arterial intervention in the hope of achieving resolution of probable blockage right lower extremity. We will hold on any surgical planning until arterial procedure performed. Patient will need repeat arterial Doppler. At this time we recommend continuation of antibiosis and wound care. Minimal weightbearing right lower extremity. Subjective/Objective   Chief Complaint:   Chief Complaint   Patient presents with   • Fall     Pt arrives BLS from home, reported that he was found on the ground, co of lower back pain. Pt awake, follows commands. Not answers the time and year "I don't know". Daughter called 911. Pt doesn't know how he fall and how long he has been on the floor. Subjective: Patient seen at bedside. He is aware of his condition. He is amenable to any and all treatment. Blood pressure 112/58, pulse 56, temperature (!) 97.1 °F (36.2 °C), temperature source Oral, resp. rate 18, height 6' 1" (1.854 m), weight 115 kg (253 lb), SpO2 95 %. ,Body mass index is 33.38 kg/m². Invasive Devices     Peripheral Intravenous Line  Duration           Peripheral IV 08/23/23 Left;Ventral (anterior) Forearm 4 days          Hemodialysis Catheter  Duration           HD Temporary Double Catheter 2 days                Physical Exam:   General appearance: alert, cooperative and no distress  Neuro/Vascular: No occult change in neurovascular status.   Arterial Doppler demonstrates changes consistent with probable blockage at right lower extremity trifurcation of the leg. Profound hypoesthesia of the foot bilateral  Extremity: Bilateral lower extremity demonstrates decrease edema. Right lower extremity does have some cyanosis of the forefoot. Wounds without occult change. Patient has acquired deformity first MPJ bilateral.  Patient demonstrates positive growth of tear within bone of first MPJ right foot. There is Proteus vulgaris and Enterobacter. Labs and other studies:   CBC w/diff  Results from last 7 days   Lab Units 08/27/23  0455 08/23/23  0414 08/22/23  0516   WBC Thousand/uL 10.02   < > 8.06   HEMOGLOBIN g/dL 9.9*   < > 11.6*   HEMATOCRIT % 31.4*   < > 36.2*   PLATELETS Thousands/uL 290   < > 230   NEUTROS PCT %  --   --  74   LYMPHS PCT %  --   --  12*   MONOS PCT %  --   --  10   EOS PCT %  --   --  2    < > = values in this interval not displayed. BMP  Results from last 7 days   Lab Units 08/27/23  0455   POTASSIUM mmol/L 3.8   CHLORIDE mmol/L 95*   CO2 mmol/L 27   BUN mg/dL 36*   CREATININE mg/dL 5.84*   CALCIUM mg/dL 7.8*     CMP  Results from last 7 days   Lab Units 08/27/23  0455   POTASSIUM mmol/L 3.8   CHLORIDE mmol/L 95*   CO2 mmol/L 27   BUN mg/dL 36*   CREATININE mg/dL 5.84*   CALCIUM mg/dL 7.8*       @Culture@  Lab Results   Component Value Date    BLOODCX No Growth at 72 hrs. 08/24/2023    BLOODCX No Growth at 72 hrs. 08/24/2023    BLOODCX Enterobacter cloacae (A) 08/21/2023    BLOODCX Granulicatella adiacens (A) 08/21/2023    BLOODCX Proteus vulgaris (A) 08/21/2023    BLOODCX Alpha Hemolytic Streptococcus (A) 08/21/2023    BLOODCX Bacteroides fragilis (A) 08/21/2023    BLOODCX No Growth After 5 Days. 08/21/2023    BLOODCX No Growth After 5 Days. 07/26/2022    BLOODCX No Growth After 5 Days.  07/26/2022    WOUNDCULT 1+ Growth of Proteus vulgaris group (A) 08/24/2023    WOUNDCULT 1+ Growth of Enterobacter cloacae (A) 08/24/2023    WOUNDCULT (A) 08/24/2023     1+ Growth of Beta Hemolytic Streptococcus Not Group A, B, C, F or G    WOUNDCULT 1+ Growth of Pseudomonas aeruginosa (A) 08/21/2023    WOUNDCULT 2+ Growth of Enterobacter cloacae (A) 08/21/2023    WOUNDCULT 2+ Growth of 08/21/2023         Current Facility-Administered Medications:   •  acetaminophen (TYLENOL) tablet 650 mg, 650 mg, Oral, Q6H PRN, Lauren Glasgow MD, 650 mg at 08/20/23 1141  •  acetaminophen (TYLENOL) tablet 975 mg, 975 mg, Oral, Q8H, Joel Dalton MD, 975 mg at 08/27/23 0207  •  atorvastatin (LIPITOR) tablet 80 mg, 80 mg, Oral, Daily, Leslye Dalton MD, 80 mg at 08/27/23 0836  •  carvedilol (COREG) tablet 12.5 mg, 12.5 mg, Oral, BID, Lauren Glasgow MD, 12.5 mg at 08/27/23 0836  •  cefepime (MAXIPIME) IVPB (premix in dextrose) 1,000 mg 50 mL, 1,000 mg, Intravenous, Q24H, Jaren Méndez DO, Last Rate: 100 mL/hr at 08/26/23 1944, 1,000 mg at 08/26/23 1944  •  famotidine (PEPCID) tablet 10 mg, 10 mg, Oral, Daily, Lauren Glasgow MD, 10 mg at 08/27/23 1334  •  gabapentin (NEURONTIN) oral solution 200 mg, 200 mg, Oral, BID, Jaren Méndez DO, 200 mg at 08/27/23 0945  •  haloperidol lactate (HALDOL) injection 2 mg, 2 mg, Intramuscular, Q6H PRN, Jaren Méndez DO, 2 mg at 08/23/23 1441  •  heparin (porcine) subcutaneous injection 5,000 Units, 5,000 Units, Subcutaneous, Q8H [facility], Leslye Dalton MD, 5,000 Units at 08/27/23 0502  •  HYDROmorphone (DILAUDID) injection 1 mg, 1 mg, Intravenous, Q4H PRN, Lauren Glasgow MD, 1 mg at 08/25/23 2337  •  insulin glargine (LANTUS) subcutaneous injection 15 Units 0.15 mL, 15 Units, Subcutaneous, HS, Jaren Méndez DO, 15 Units at 08/26/23 2145  •  insulin lispro (HumaLOG) 100 units/mL subcutaneous injection 1-5 Units, 1-5 Units, Subcutaneous, HS, Leslye Dalton MD, 1 Units at 08/26/23 2145  •  insulin lispro (HumaLOG) 100 units/mL subcutaneous injection 2-12 Units, 2-12 Units, Subcutaneous, TID AC, 2 Units at 08/26/23 1556 **AND** Fingerstick Glucose (POCT), , , TID AC, Kay Boss MD  •  insulin lispro (HumaLOG) 100 units/mL subcutaneous injection 5 Units, 5 Units, Subcutaneous, TID With Meals, Jaren Méndez DO, 5 Units at 08/27/23 1209  •  lidocaine (LIDODERM) 5 % patch 1 patch, 1 patch, Topical, Daily, Kay Boss MD, 1 patch at 08/25/23 0834  •  LORazepam (ATIVAN) injection 0.5 mg, 0.5 mg, Intravenous, Q6H PRN, Kay Boss MD, 0.5 mg at 08/22/23 1240  •  methocarbamol (ROBAXIN) tablet 500 mg, 500 mg, Oral, Q12H PRN, Kay Boss MD, 500 mg at 08/25/23 1659  •  metroNIDAZOLE (FLAGYL) tablet 500 mg, 500 mg, Oral, Q8H [facility], Jaren Méndez DO, 500 mg at 08/27/23 0947  •  NIFEdipine (PROCARDIA XL) 24 hr tablet 30 mg, 30 mg, Oral, BID, Joel Dalton MD, 30 mg at 08/27/23 0835  •  ondansetron (ZOFRAN) injection 4 mg, 4 mg, Intravenous, Q6H PRN, Kay Boss MD  •  oxyCODONE (ROXICODONE) IR tablet 5 mg, 5 mg, Oral, Q8H PRN, Julieta Dalton MD, 5 mg at 08/25/23 2221  •  senna-docusate sodium (SENOKOT S) 8.6-50 mg per tablet 2 tablet, 2 tablet, Oral, BID, Jaren Méndez DO, 2 tablet at 08/27/23 1919  •  sevelamer (RENAGEL) tablet 800 mg, 800 mg, Oral, TID With Meals, Kay Boss MD, 800 mg at 08/27/23 1208    Imaging: I have personally reviewed pertinent films in PACS  EKG, Pathology, and Other Studies: I have personally reviewed pertinent reports. VTE Pharmacologic Prophylaxis: Sequential compression device (Venodyne)   VTE Mechanical Prophylaxis: sequential compression device          Counseling and Coordination of care  I spent 35 minutes face-to-face with patient today. More than 50% was spent in counseling & coordination of care. Counseled patient regarding for further work-up to ascertain status of arterial circulation and possible a gram with stenting. Continue wound care. Patient still at risk for first ray resection. Kat Koenig DPM

## 2023-08-27 NOTE — ASSESSMENT & PLAN NOTE
Lab Results   Component Value Date    HGBA1C 6.6 (H) 07/07/2022       Recent Labs     08/26/23  1543 08/26/23  2106 08/27/23  0730 08/27/23  1107   POCGLU 155* 159* 146* 138       Blood Sugar Average: Last 72 hrs:  (P) [id number]     Podiatry consulted for [facility] grade 1 ulcer submetatarsal 1 left foot. S/P Debridement  Noted to have elevated CRP, procalcitonin,  1 of 2 blood cultures growing G- rods and G+ cocci in chains  8/22 Started vanc cefepime and flagyl  8/23 ID switched abx to cefepime and flagyl, will continue  F/U final blood and wound cultures  MRI foot First submetatarsal head plantar skin ulceration with suspected draining sinus tract extending to the first metatarsal phalangeal joint suspicious for septic arthropathy  8/24 s/p I&D by podiatry  Vascular consulted as imaging showing lower limb diffuse atherosclerotic disease noted throughout with elevated PSV of proximal popliteal artery and tibial peroneal trunk suggesting less than 50% stenosis, metatarsal pressure greater than 200, great toe pressure 95 mmHg within the healing range on right side. Left lower limb showed diffuse atherosclerotic disease noted throughout with a 50 to 75% stenosis of the proximal popliteal artery, metatarsal pressure 170, great toe pressure 98 mmHg within healing range.   Plan for angiogram with IR Monday  as per discussion with vascular

## 2023-08-27 NOTE — ASSESSMENT & PLAN NOTE
Lab Results   Component Value Date    EGFR 9 08/27/2023    EGFR 7 08/26/2023    EGFR 5 08/25/2023    CREATININE 5.84 (H) 08/27/2023    CREATININE 6.71 (H) 08/26/2023    CREATININE 8.56 (H) 08/25/2023   Patient is on Tuesday Thursday Saturday schedule at [facility]    Temporary HD cath removed IR, temporary  catheter placed 8/25  Neurology following, patient had dialysis Saturday

## 2023-08-27 NOTE — ASSESSMENT & PLAN NOTE
Patient sustained a fall about 2 days prior to admission   Etiology not clear-EKG was unremarkable. Blood sugar was 157. Questionable hypoglycemia versus accidental fall. Patient not exhibiting any signs of stroke. Telemetry showed no evidence of arrhythmia  Patient does not remember how he fell. Patient was found on the floor and could not get up. We will get PT/OT evaluation and treatment  CAT scan of the brain, CT chest abdomen pelvis, cervical spine and lumbar reconstructions study was ordered in the ED but patient refused the studies  Patient got CT of the head and neck after receiving Ativan which was unremarkable.   Fall precautions

## 2023-08-27 NOTE — ASSESSMENT & PLAN NOTE
Lab Results   Component Value Date    HGBA1C 6.6 (H) 07/07/2022       Recent Labs     08/26/23  1543 08/26/23  2106 08/27/23  0730 08/27/23  1107   POCGLU 155* 159* 146* 138       Blood Sugar Average: Last 72 hrs:  (P) [id number]

## 2023-08-27 NOTE — PROGRESS NOTES
[id number] [facility]  Progress Note  Name: Teddy Sosa  MRN: [id number]  Unit/Bed#: 913 [facility] 421-01 I Date of Admission: 8/19/2023   Date of Service: 8/27/2023 I Hospital Day: 8    Assessment/Plan   * Polymicrobial bacterial infection  Assessment & Plan  Polymicrobial bacteremia.  Enterobacter/Proteus/Streptococcus/Granulicatella  Suspect cause of patient's initial metabolic encephalopathy which has improved with abx treatment  ID following  On cefepime and flagyl  Afebrile  Monitor fever curve and cbc      Diabetic ulcer of left midfoot associated with type 2 diabetes mellitus, limited to breakdown of skin [facility])  Assessment & Plan  Lab Results   Component Value Date    HGBA1C 6.6 (H) 07/07/2022       Recent Labs     08/26/23  1543 08/26/23  2106 08/27/23  0730 08/27/23  1107   POCGLU 155* 159* 146* 138       Blood Sugar Average: Last 72 hrs:  (P) [id number]     Podiatry consulted for [place] grade 1 ulcer submetatarsal 1 left foot. S/P Debridement  Noted to have elevated CRP, procalcitonin,  1 of 2 blood cultures growing G- rods and G+ cocci in chains  8/22 Started vanc cefepime and flagyl  8/23 ID switched abx to cefepime and flagyl, will continue  F/U final blood and wound cultures  MRI foot First submetatarsal head plantar skin ulceration with suspected draining sinus tract extending to the first metatarsal phalangeal joint suspicious for septic arthropathy  8/24 s/p I&D by podiatry  Vascular consulted as imaging showing lower limb diffuse atherosclerotic disease noted throughout with elevated PSV of proximal popliteal artery and tibial peroneal trunk suggesting less than 50% stenosis, metatarsal pressure greater than 200, great toe pressure 95 mmHg within the healing range on right side.   Left lower limb showed diffuse atherosclerotic disease noted throughout with a 50 to 75% stenosis of the proximal popliteal artery, metatarsal pressure 170, great toe pressure 98 mmHg within healing range. Plan for angiogram with IR Monday  as per discussion with vascular      Status post fall  Assessment & Plan  Patient sustained a fall about 2 days prior to admission   Etiology not clear-EKG was unremarkable. Blood sugar was 157. Questionable hypoglycemia versus accidental fall. Patient not exhibiting any signs of stroke. Telemetry showed no evidence of arrhythmia  Patient does not remember how he fell. Patient was found on the floor and could not get up. We will get PT/OT evaluation and treatment  CAT scan of the brain, CT chest abdomen pelvis, cervical spine and lumbar reconstructions study was ordered in the ED but patient refused the studies  Patient got CT of the head and neck after receiving Ativan which was unremarkable. Fall precautions    ESRD (end stage renal disease) [facility])  Assessment & Plan  Lab Results   Component Value Date    EGFR 9 08/27/2023    EGFR 7 08/26/2023    EGFR 5 08/25/2023    CREATININE 5.84 (H) 08/27/2023    CREATININE 6.71 (H) 08/26/2023    CREATININE 8.56 (H) 08/25/2023   Patient is on Tuesday Thursday Saturday schedule at [facility]    Temporary HD cath removed IR, temporary  catheter placed 8/25  Neurology following, patient had dialysis Saturday    Type 2 diabetes mellitus, without long-term current use of insulin [facility])  Assessment & Plan  Lab Results   Component Value Date    HGBA1C 6.6 (H) 07/07/2022       Recent Labs     08/26/23  1543 08/26/23  2106 08/27/23  0730 08/27/23  1107   POCGLU 155* 159* 146* 138       Blood Sugar Average: Last 72 hrs:  (P) 425.5295897973225684   Patient is on linagliptin at home. Continue Humalog sliding scale with Accu-Cheks before every meal and at bedtime.   Patient has been a diabetic diet  lantus 15 lispro 5 units     Hypertension  Assessment & Plan  Continue Procardia 30 mg p.o. twice daily, Coreg 12.5 mg p.o. twice daily  Vital signs as per unit routine    Subacute osteomyelitis of right foot [facility])  Assessment & Plan  Podiatry following  Vascular consulted, plan for angiogram with IR on Monday     Altered mental status  Assessment & Plan  Resolved  Patient had been confused and more agitated and not following commands and refusing all treatment previous day; at baseline currently   UDS negative   Neurology consult pending at the current time-based on further course might need to consider MRI of the brain 2. Patient has known history of TIAs  CT of the head and neck with no acute intracranial abnormality with tiny hypodensity in the right posterior inferior putamen likely chronic lacunar infarct dilated prevascular space. Negative CTA for large vessel occlusion dissection or aneurysm with possible tracheobronchomalacia  Discussed with patient's friend-no history of alcohol use or drug use  Ammonia and TSH level were normal  Psychiatry was consulted-recommending Haldol 2 mg p.o. every 4 hours as needed for agitation at 2 mg IM as needed for severe agitation. Started septic work-up also as patient noted to have deep left foot wound-patient has been afebrile and had white count initially which has since normalized. Suspect most symptoms 2/2 infection see below  B12, folate, TSH, ammonia WNL  Mentation appears to be improved; at baseline     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 mellitus, with necrosis of bone [facility])  Assessment & Plan  Lab Results   Component Value Date    HGBA1C 6.6 (H) 07/07/2022       Recent Labs     08/26/23  1543 08/26/23  2106 08/27/23  0730 08/27/23  1107   POCGLU 155* 159* 146* 138       Blood Sugar Average: Last 72 hrs:  (P) [id number]               VTE Pharmacologic Prophylaxis: VTE Score: 2 Moderate Risk (Score 3-4) - Pharmacological DVT Prophylaxis Ordered: heparin. Patient Centered Rounds: I performed bedside rounds with nursing staff today.   Discussions with Specialists or Other Care Team Provider: podiatry     Education and Discussions with Family / Patient: Attempted to update  (daughter) via phone. Unable to contact. Mailbox is full     Total Time Spent on Date of Encounter in care of patient: 45 minutes This time was spent on one or more of the following: performing physical exam; counseling and coordination of care; obtaining or reviewing history; documenting in the medical record; reviewing/ordering tests, medications or procedures; communicating with other healthcare professionals and discussing with patient's family/caregivers. Current Length of Stay: 8 day(s)  Current Patient Status: Inpatient   Certification Statement: The patient will continue to require additional inpatient hospital stay due to IV abx, angiogram with IR, repeat blood cultures, ID following, plan for eventual replacement of permacath, possible surgery with podiatry, hemodialysis   Discharge Plan: Anticipate discharge in >72 hrs to discharge location to be determined pending rehab evaluations. Code Status: Level 1 - Full Code    Subjective:   Patient seen Nova Watson in bed resting comfortably. Reports he slept okay last night. Denies any pain. He is asking if he will be able to walk around in the hallway a bit with a walker and some assistance. We discussed wearing a surgical shoe over his dressing to protect which she is agreeable to. Good appetite no nausea or vomiting. Objective:     Vitals:   Temp (24hrs), Av.3 °F (36.3 °C), Min:97.1 °F (36.2 °C), Max:97.6 °F (36.4 °C)    Temp:  [97.1 °F (36.2 °C)-97.6 °F (36.4 °C)] 97.1 °F (36.2 °C)  HR:  [56-62] 56  Resp:  [18] 18  BP: ()/(50-60) 112/58  SpO2:  [94 %-97 %] 95 %  Body mass index is 33.38 kg/m². Input and Output Summary (last 24 hours): Intake/Output Summary (Last 24 hours) at 2023 1501  Last data filed at 2023 1201  Gross per 24 hour   Intake 240 ml   Output --   Net 240 ml       Physical Exam:   Physical Exam  Vitals and nursing note reviewed. HENT:      Head: Normocephalic.       Nose: Nose normal.      Mouth/Throat:      Mouth: Mucous membranes are moist.   Eyes:      Extraocular Movements: Extraocular movements intact. Conjunctiva/sclera: Conjunctivae normal.      Pupils: Pupils are equal, round, and reactive to light. Cardiovascular:      Rate and Rhythm: Normal rate and regular rhythm. Pulses: Normal pulses. Heart sounds: Normal heart sounds. Pulmonary:      Effort: Pulmonary effort is normal.      Breath sounds: Normal breath sounds. Abdominal:      General: Bowel sounds are normal. There is no distension. Palpations: Abdomen is soft. Tenderness: There is no abdominal tenderness. Musculoskeletal:      Cervical back: Normal range of motion. Comments: Right foot dressing CDI, no strike through drainage noted. Scabbed regions noted bilateral LE with venous stasis discoloration noted. Skin:     General: Skin is warm and dry. Capillary Refill: Capillary refill takes less than 2 seconds. Comments: Right tunneled cath present, CDI, no redness, swelling or drainage noted. Neurological:      General: No focal deficit present. Mental Status: He is alert and oriented to person, place, and time. Psychiatric:         Mood and Affect: Mood normal.         Behavior: Behavior normal.         Thought Content: Thought content normal.         Judgment: Judgment normal.          Additional Data:     Labs:  Results from last 7 days   Lab Units 08/27/23  0455 08/23/23  0414 08/22/23  0516   WBC Thousand/uL 10.02   < > 8.06   HEMOGLOBIN g/dL 9.9*   < > 11.6*   HEMATOCRIT % 31.4*   < > 36.2*   PLATELETS Thousands/uL 290   < > 230   NEUTROS PCT %  --   --  74   LYMPHS PCT %  --   --  12*   MONOS PCT %  --   --  10   EOS PCT %  --   --  2    < > = values in this interval not displayed.      Results from last 7 days   Lab Units 08/27/23  0455   SODIUM mmol/L 131*   POTASSIUM mmol/L 3.8   CHLORIDE mmol/L 95*   CO2 mmol/L 27   BUN mg/dL 36*   CREATININE mg/dL 5.84* ANION GAP mmol/L 9   CALCIUM mg/dL 7.8*   ALBUMIN g/dL 3.1*   GLUCOSE RANDOM mg/dL 140         Results from last 7 days   Lab Units 08/27/23  1107 08/27/23  0730 08/26/23  2106 08/26/23  1543 08/26/23  1129 08/26/23  0719 08/25/23  2133 08/25/23  1646 08/25/23  1127 08/25/23  0731 08/24/23  2054 08/24/23  1712   POC GLUCOSE mg/dl 138 146* 159* 155* 84 101 181* 150* 137 114 150* 211*         Results from last 7 days   Lab Units 08/22/23  1053   PROCALCITONIN ng/ml 112.62*       Lines/Drains:  Invasive Devices     Peripheral Intravenous Line  Duration           Peripheral IV 08/23/23 Left;Ventral (anterior) Forearm 4 days          Hemodialysis Catheter  Duration           HD Temporary Double Catheter 2 days                      Imaging: No pertinent imaging reviewed. Recent Cultures (last 7 days):   Results from last 7 days   Lab Units 08/24/23  1748 08/24/23  0618 08/21/23  1756 08/21/23  1627 08/21/23  1423   BLOOD CULTURE   --  No Growth at 72 hrs. No Growth at 72 hrs.  --  Enterobacter cloacae*  Granulicatella adiacens*  Proteus vulgaris*  Alpha Hemolytic Streptococcus*  Bacteroides fragilis* No Growth After 5 Days.    GRAM STAIN RESULT  Rare Polys  No bacteria seen  1+ Polys*  1+ Gram positive cocci in pairs*  --  1+ Polys*  1+ Gram positive cocci in pairs*  1+ Gram negative rods* Gram negative rods*  Gram positive cocci in chains*  --    WOUND CULTURE  1+ Growth of Proteus vulgaris group*  1+ Growth of Enterobacter cloacae*  1+ Growth of Beta Hemolytic Streptococcus Not Group A, B, C, F or G*  --  1+ Growth of Pseudomonas aeruginosa*  2+ Growth of Enterobacter cloacae*  2+ Growth of  --   --        Last 24 Hours Medication List:   Current Facility-Administered Medications   Medication Dose Route Frequency Provider Last Rate   • acetaminophen  650 mg Oral Q6H PRN Joel Dalton MD     • acetaminophen  975 mg Oral Q8H Joel Dalton MD     • atorvastatin  80 mg Oral Daily Joel MD Sha     • carvedilol  12.5 mg Oral BID Siva Bull MD     • cefepime  1,000 mg Intravenous Q24H Jaren Méndez DO 1,000 mg (08/26/23 1944)   • famotidine  10 mg Oral Daily Siva Bull MD     • gabapentin  200 mg Oral BID Jaren Méndez, DO     • haloperidol lactate  2 mg Intramuscular Q6H PRN Jaren Méndez DO     • heparin (porcine)  5,000 Units Subcutaneous Q8H [facility] Siva Bull MD     • HYDROmorphone  1 mg Intravenous Q4H PRN Siva Bull MD     • insulin glargine  15 Units Subcutaneous HS Jaren Méndez DO     • insulin lispro  1-5 Units Subcutaneous HS Siva Bull MD     • insulin lispro  2-12 Units Subcutaneous TID AC Siva Bull MD     • insulin lispro  5 Units Subcutaneous TID With Meals Jaren Méndez DO     • lidocaine  1 patch Topical Daily Siva Bull MD     • LORazepam  0.5 mg Intravenous Q6H PRN Siva Bull MD     • methocarbamol  500 mg Oral Q12H PRN Siva Bull MD     • metroNIDAZOLE  500 mg Oral Q8H [facility] Jaren Méndez DO     • NIFEdipine  30 mg Oral BID Siva Bull MD     • ondansetron  4 mg Intravenous Q6H PRN Siva Bull MD     • oxyCODONE  5 mg Oral Q8H PRN Siva Bull MD     • senna-docusate sodium  2 tablet Oral BID Jaren Méndez DO     • sevelamer  800 mg Oral TID With Meals Siva Bull MD          Today, Patient Was Seen By: GRANT Arambula    **Please Note: This note may have been constructed using a voice recognition system. **

## 2023-08-27 NOTE — ASSESSMENT & PLAN NOTE
Lab Results   Component Value Date    HGBA1C 6.6 (H) 07/07/2022       Recent Labs     08/26/23  1543 08/26/23  2106 08/27/23  0730 08/27/23  1107   POCGLU 155* 159* 146* 138       Blood Sugar Average: Last 72 hrs:  (P) [id number]   Patient is on linagliptin at home. Continue Humalog sliding scale with Accu-Cheks before every meal and at bedtime.   Patient has been a diabetic diet  lantus 15 lispro 5 units

## 2023-08-27 NOTE — QUICK NOTE
Patient had dialysis yesterday, had episode of dizziness, lightheaded. UF 0  Next HD Tuesday.   Pending PermCath once BCx are negative      Tobin Maher MD  Nephrology Attending

## 2023-08-27 NOTE — PLAN OF CARE
Problem: Potential for Falls  Goal: Patient will remain free of falls  Description: INTERVENTIONS:  - Educate patient/family on patient safety including physical limitations  - Instruct patient to call for assistance with activity   - Consult OT/PT to assist with strengthening/mobility   - Keep Call bell within reach  - Keep bed low and locked with side rails adjusted as appropriate  - Keep care items and personal belongings within reach  - Initiate and maintain comfort rounds  - Make Fall Risk Sign visible to staff  - Apply yellow socks and bracelet for high fall risk patients  - Consider moving patient to room near nurses station  Outcome: Progressing     Problem: MOBILITY - ADULT  Goal: Maintain or return to baseline ADL function  Description: INTERVENTIONS:  - Educate patient/family on patient safety including physical limitations  - Instruct patient to call for assistance with activity   - Consult OT/PT to assist with strengthening/mobility   - Keep Call bell within reach  - Keep bed low and locked with side rails adjusted as appropriate  - Keep care items and personal belongings within reach  - Initiate and maintain comfort rounds  - Make Fall Risk Sign visible to staff  - Apply yellow socks and bracelet for high fall risk patients  - Consider moving patient to room near nurses station  Outcome: Progressing     Problem: PAIN - ADULT  Goal: Verbalizes/displays adequate comfort level or baseline comfort level  Description: Interventions:  - Encourage patient to monitor pain and request assistance  - Assess pain using appropriate pain scale  - Administer analgesics based on type and severity of pain and evaluate response  - Implement non-pharmacological measures as appropriate and evaluate response  - Consider cultural and social influences on pain and pain management  - Notify physician/advanced practitioner if interventions unsuccessful or patient reports new pain  Outcome: Progressing     Problem: SAFETY ADULT  Goal: Patient will remain free of falls  Description: INTERVENTIONS:  - Educate patient/family on patient safety including physical limitations  - Instruct patient to call for assistance with activity   - Consult OT/PT to assist with strengthening/mobility   - Keep Call bell within reach  - Keep bed low and locked with side rails adjusted as appropriate  - Keep care items and personal belongings within reach  - Initiate and maintain comfort rounds  - Make Fall Risk Sign visible to staff  - Apply yellow socks and bracelet for high fall risk patients  - Consider moving patient to room near nurses station  Outcome: Progressing     Problem: DISCHARGE PLANNING  Goal: Discharge to home or other facility with appropriate resources  Description: INTERVENTIONS:  - Identify barriers to discharge w/patient and caregiver  - Arrange for needed discharge resources and transportation as appropriate  - Identify discharge learning needs (meds, wound care, etc.)  - Arrange for interpretive services to assist at discharge as needed  - Refer to Case Management Department for coordinating discharge planning if the patient needs post-hospital services based on physician/advanced practitioner order or complex needs related to functional status, cognitive ability, or social support system  Outcome: Progressing

## 2023-08-27 NOTE — PLAN OF CARE
Problem: INFECTION - ADULT  Goal: Absence or prevention of progression during hospitalization  Description: INTERVENTIONS:  - Assess and monitor for signs and symptoms of infection  - Monitor lab/diagnostic results  - Monitor all insertion sites, i.e. indwelling lines, tubes, and drains  - Monitor endotracheal if appropriate and nasal secretions for changes in amount and color  - [place] appropriate cooling/warming therapies per order  - Administer medications as ordered  - Instruct and encourage patient and family to use good hand hygiene technique  - Identify and instruct in appropriate isolation precautions for identified infection/condition  Outcome: Progressing     Problem: METABOLIC, FLUID AND ELECTROLYTES - ADULT  Goal: Electrolytes maintained within normal limits  Description: INTERVENTIONS:  - Monitor labs and assess patient for signs and symptoms of electrolyte imbalances  - Administer electrolyte replacement as ordered  - Monitor response to electrolyte replacements, including repeat lab results as appropriate  - Instruct patient on fluid and nutrition as appropriate  Outcome: Progressing LUE swelling/bruising, no injury

## 2023-08-27 NOTE — CONSULTS
Interventional Radiology  Consultation 8/27/2023     Consults  Андрей Albrecht   1960   [id number]      Assessment/Plan:  58year old male with ESRD and RLE osteomyelitis. LEADS show:    Diffuse atherosclerotic disease noted throughout with elevated PSV of the  proximal popliteal artery and tibioperoneal trunk suggesting a <50% stenosis. Ankle/Brachial index: unreliable due to non-compressible vessels. No Prior. PVR/ PPG tracings are normal.  Metatarsal pressure of >200 mmHg. Great toe pressure of 95 mmHg, within the healing range. Plan for RLE angiogram, timing to be determined by IR schedule and availability. Please make NPO and midnight in case schedule allows for Monday 8/28/23.     Medical Problems     Problem List     * (Principal) Polymicrobial bacterial infection    ESRD (end stage renal disease) ([address])    Lab Results   Component Value Date    EGFR 9 08/27/2023    EGFR 7 08/26/2023    EGFR 5 08/25/2023    CREATININE 5.84 (H) 08/27/2023    CREATININE 6.71 (H) 08/26/2023    CREATININE 8.56 (H) 08/25/2023         Type 2 diabetes mellitus, without long-term current use of insulin ([address])    Lab Results   Component Value Date    HGBA1C 6.6 (H) 07/07/2022       Recent Labs     08/26/23  1129 08/26/23  1543 08/26/23  2106 08/27/23  0730   POCGLU 84 155* 159* 146*       Blood Sugar Average: Last 72 hrs:  (P) 360.8440835599798902        Hypertension    History of TIA (transient ischemic attack)    T10 vertebral fracture (HCC)    Anemia    Hypervolemia    Hemodialysis status (HCC)    Left hip pain    Severe Left Orchalgia    Right shoulder pain    Osteoarthritis of left hip    Status post fall    Traumatic rhabdomyolysis (HCC)    Leukocytosis    Electrolyte abnormality    Altered mental status    Open wound of right foot    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 mellitus, with bone involvement without evidence of necrosis ([facility])    Lab Results   Component Value Date    HGBA1C 6.6 (H) 07/07/2022       Recent Labs     08/26/23  1129 08/26/23  1543 08/26/23  2106 08/27/23  0730   POCGLU 84 155* 159* 146*       Blood Sugar Average: Last 72 hrs:  (P) 648.9762750738394147        Diabetic ulcer of left midfoot associated with type 2 diabetes mellitus, limited to breakdown of skin ([facility])    Lab Results   Component Value Date    HGBA1C 6.6 (H) 07/07/2022       Recent Labs     08/26/23  1129 08/26/23  1543 08/26/23  2106 08/27/23  0730   POCGLU 84 155* 159* 146*       Blood Sugar Average: Last 72 hrs:  (P) 653.7073953626921966        Diabetic polyneuropathy associated with type 2 diabetes mellitus ([address])    Lab Results   Component Value Date    HGBA1C 6.6 (H) 07/07/2022       Recent Labs     08/26/23  1129 08/26/23  1543 08/26/23  2106 08/27/23  0730   POCGLU 84 155* 159* 146*       Blood Sugar Average: Last 72 hrs:  (P) 671.6687034631283159        Diabetes mellitus type 2 with peripheral artery disease ([facility])    Lab Results   Component Value Date    HGBA1C 6.6 (H) 07/07/2022       Recent Labs     08/26/23  1129 08/26/23  1543 08/26/23  2106 08/27/23  0730   POCGLU 84 155* 159* 146*       Blood Sugar Average: Last 72 hrs:  (P) 402.4098811635965291        History of amputation of lesser toe of left foot ([facility])    Onychomycosis    Subacute osteomyelitis of right foot ([address])    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 mellitus, with necrosis of bone ([facility])    Lab Results   Component Value Date    HGBA1C 6.6 (H) 07/07/2022       Recent Labs     08/26/23  1129 08/26/23  1543 08/26/23  2106 08/27/23  0730   POCGLU 84 155* 159* 146*       Blood Sugar Average: Last 72 hrs:  (P) 320.1410948508459206        Acquired deformity of foot, right    Charcot's joint of right foot    Cellulitis of right foot            Subjective:     Patient ID: Андрей Albrecht is a 58 y.o. male. History of Present Illness  58year old male with ESRD and RLE osteomyelitis.     Review of Systems  as above    Past Medical History:   Diagnosis Date   • CKD    • Diabetes mellitus ([address])    • Hypertension    • Left toe amputee ([address])    • TIA (transient ischemic attack)         Past Surgical History:   Procedure Laterality Date   • IR TEMPORARY DIALYSIS CATHETER PLACEMENT  8/25/2023   • IR TUNNELED CENTRAL LINE REMOVAL  8/23/2023   • IR TUNNELED DIALYSIS CATHETER PLACEMENT  1/27/2022        Social History     Tobacco Use   Smoking Status Never   • Passive exposure: Never   Smokeless Tobacco Never        Social History     Substance and Sexual Activity   Alcohol Use Not Currently   • Alcohol/week: 0.0 standard drinks of alcohol    Comment: 0        Social History     Substance and Sexual Activity   Drug Use Never        No Known Allergies    Current Facility-Administered Medications   Medication Dose Route Frequency Provider Last Rate Last Admin   • acetaminophen (TYLENOL) tablet 650 mg  650 mg Oral Q6H PRN Joel Dalton MD   650 mg at 08/20/23 1141   • acetaminophen (TYLENOL) tablet 975 mg  975 mg Oral Q8H Lily Carvalho MD   975 mg at 08/27/23 0207   • atorvastatin (LIPITOR) tablet 80 mg  80 mg Oral Daily Lily Carvalho MD   80 mg at 08/26/23 1206   • carvedilol (COREG) tablet 12.5 mg  12.5 mg Oral BID Lily Carvalho MD   12.5 mg at 08/26/23 1700   • cefepime (MAXIPIME) IVPB (premix in dextrose) 1,000 mg 50 mL  1,000 mg Intravenous Q24H Jaren Méndez,  mL/hr at 08/26/23 1944 1,000 mg at 08/26/23 1944   • famotidine (PEPCID) tablet 10 mg  10 mg Oral Daily Lily Carvalho MD   10 mg at 08/26/23 1206   • gabapentin (NEURONTIN) oral solution 200 mg  200 mg Oral BID Jaren Tomario, DO   200 mg at 08/26/23 1744   • haloperidol lactate (HALDOL) injection 2 mg  2 mg Intramuscular Q6H PRN Jaren Méndez, DO   2 mg at 08/23/23 1441   • heparin (porcine) subcutaneous injection 5,000 Units  5,000 Units Subcutaneous Q8H [facility] Lily Carvalho MD   5,000 Units at 08/27/23 0502   • HYDROmorphone (DILAUDID) injection 1 mg  1 mg Intravenous Q4H PRN Trang Rodriguez MD   1 mg at 08/25/23 2337   • insulin glargine (LANTUS) subcutaneous injection 15 Units 0.15 mL  15 Units Subcutaneous HS Pand Toe, DO   15 Units at 08/26/23 2145   • insulin lispro (HumaLOG) 100 units/mL subcutaneous injection 1-5 Units  1-5 Units Subcutaneous HS Trang Rodriguez MD   1 Units at 08/26/23 2145   • insulin lispro (HumaLOG) 100 units/mL subcutaneous injection 2-12 Units  2-12 Units Subcutaneous TID AC Trang Rodriguez MD   2 Units at 08/26/23 1556   • insulin lispro (HumaLOG) 100 units/mL subcutaneous injection 5 Units  5 Units Subcutaneous TID With Meals Pandi Todhe, DO   5 Units at 08/26/23 1604   • lidocaine (LIDODERM) 5 % patch 1 patch  1 patch Topical Daily Trang Rodriguez MD   1 patch at 08/25/23 0834   • LORazepam (ATIVAN) injection 0.5 mg  0.5 mg Intravenous Q6H PRN Joel Dalton MD   0.5 mg at 08/22/23 1240   • methocarbamol (ROBAXIN) tablet 500 mg  500 mg Oral Q12H PRN Trang Rodriguez MD   500 mg at 08/25/23 1659   • metroNIDAZOLE (FLAGYL) tablet 500 mg  500 mg Oral Q8H [address] [facility] Toe, DO   500 mg at 08/27/23 0208   • NIFEdipine (PROCARDIA XL) 24 hr tablet 30 mg  30 mg Oral BID Trang Rodriguez MD   30 mg at 08/26/23 1700   • ondansetron (ZOFRAN) injection 4 mg  4 mg Intravenous Q6H PRN Joel Dalton MD       • oxyCODONE (ROXICODONE) IR tablet 5 mg  5 mg Oral Q8H PRN Joel Dalton MD   5 mg at 08/25/23 2221   • senna-docusate sodium (SENOKOT S) 8.6-50 mg per tablet 2 tablet  2 tablet Oral BID Glenys Tomistye, DO   2 tablet at 08/26/23 1700   • sevelamer (RENAGEL) tablet 800 mg  800 mg Oral TID With Meals Trang Rodriguez MD   800 mg at 08/27/23 0759          Objective:    Vitals:    08/26/23 1649 08/26/23 1855 08/26/23 2322 08/27/23 0756   BP: 118/55 112/60 114/59 112/58   BP Location:    Left arm   Pulse: 62 59 57 56   Resp:  18 18 18   Temp:  (!) 97.3 °F (36.3 °C)  (!) 97.1 °F (36.2 °C)   TempSrc:    Oral   SpO2: 94% 96% 97% 95%   Weight:       Height:            Physical Exam  Not performed    No results found for: "BNP"   Lab Results   Component Value Date    WBC 10.02 08/27/2023    HGB 9.9 (L) 08/27/2023    HCT 31.4 (L) 08/27/2023    MCV 92 08/27/2023     08/27/2023     Lab Results   Component Value Date    INR 1.16 08/19/2023    INR 1.02 01/30/2022    INR 1.02 01/25/2022    PROTIME 14.9 (H) 08/19/2023    PROTIME 13.2 01/30/2022    PROTIME 13.2 01/25/2022     Lab Results   Component Value Date    PTT 33 08/19/2023         I have personally reviewed pertinent imaging and laboratory results. Code Status: Level 1 - Full Code  Advance Directive and Living Will:      Power of :    POLST:      IR has been consulted to evaluate the patient, determine the appropriate procedure, and whether or not a procedure can and should be performed. Thank you for allowing me to participate in the care of Alysa Elizondo. Please don't hesitate to call, text, email, or TigerText with any questions. This text is generated with voice recognition software. There may be translation, syntax,  or grammatical errors. If you have any questions, please contact the dictating provider.

## 2023-08-28 PROBLEM — R41.82 ALTERED MENTAL STATUS: Status: RESOLVED | Noted: 2023-08-20 | Resolved: 2023-08-28

## 2023-08-28 LAB
GLUCOSE SERPL-MCNC: 135 MG/DL (ref 65–140)
GLUCOSE SERPL-MCNC: 138 MG/DL (ref 65–140)
GLUCOSE SERPL-MCNC: 161 MG/DL (ref 65–140)
GLUCOSE SERPL-MCNC: 178 MG/DL (ref 65–140)

## 2023-08-28 PROCEDURE — 97116 GAIT TRAINING THERAPY: CPT

## 2023-08-28 PROCEDURE — 99232 SBSQ HOSP IP/OBS MODERATE 35: CPT | Performed by: INTERNAL MEDICINE

## 2023-08-28 PROCEDURE — 82948 REAGENT STRIP/BLOOD GLUCOSE: CPT

## 2023-08-28 PROCEDURE — 99232 SBSQ HOSP IP/OBS MODERATE 35: CPT | Performed by: PHYSICIAN ASSISTANT

## 2023-08-28 PROCEDURE — 99232 SBSQ HOSP IP/OBS MODERATE 35: CPT

## 2023-08-28 RX ADMIN — FAMOTIDINE 10 MG: 20 TABLET, FILM COATED ORAL at 09:15

## 2023-08-28 RX ADMIN — HEPARIN SODIUM 5000 UNITS: 5000 INJECTION INTRAVENOUS; SUBCUTANEOUS at 21:21

## 2023-08-28 RX ADMIN — GABAPENTIN 200 MG: 250 SUSPENSION ORAL at 18:03

## 2023-08-28 RX ADMIN — GABAPENTIN 200 MG: 250 SUSPENSION ORAL at 12:33

## 2023-08-28 RX ADMIN — METRONIDAZOLE 500 MG: 500 TABLET ORAL at 12:08

## 2023-08-28 RX ADMIN — SEVELAMER HYDROCHLORIDE 800 MG: 800 TABLET ORAL at 17:08

## 2023-08-28 RX ADMIN — METRONIDAZOLE 500 MG: 500 TABLET ORAL at 02:03

## 2023-08-28 RX ADMIN — CARVEDILOL 12.5 MG: 12.5 TABLET, FILM COATED ORAL at 17:09

## 2023-08-28 RX ADMIN — SEVELAMER HYDROCHLORIDE 800 MG: 800 TABLET ORAL at 12:33

## 2023-08-28 RX ADMIN — ATORVASTATIN CALCIUM 80 MG: 80 TABLET, FILM COATED ORAL at 09:15

## 2023-08-28 RX ADMIN — CARVEDILOL 12.5 MG: 12.5 TABLET, FILM COATED ORAL at 09:15

## 2023-08-28 RX ADMIN — INSULIN LISPRO 1 UNITS: 100 INJECTION, SOLUTION INTRAVENOUS; SUBCUTANEOUS at 21:22

## 2023-08-28 RX ADMIN — ACETAMINOPHEN 975 MG: 325 TABLET ORAL at 12:08

## 2023-08-28 RX ADMIN — INSULIN GLARGINE 15 UNITS: 100 INJECTION, SOLUTION SUBCUTANEOUS at 21:21

## 2023-08-28 RX ADMIN — CEFEPIME HYDROCHLORIDE 1000 MG: 1 INJECTION, SOLUTION INTRAVENOUS at 20:30

## 2023-08-28 RX ADMIN — NIFEDIPINE 30 MG: 30 TABLET, EXTENDED RELEASE ORAL at 17:26

## 2023-08-28 RX ADMIN — INSULIN LISPRO 5 UNITS: 100 INJECTION, SOLUTION INTRAVENOUS; SUBCUTANEOUS at 17:28

## 2023-08-28 RX ADMIN — HEPARIN SODIUM 5000 UNITS: 5000 INJECTION INTRAVENOUS; SUBCUTANEOUS at 14:33

## 2023-08-28 RX ADMIN — HEPARIN SODIUM 5000 UNITS: 5000 INJECTION INTRAVENOUS; SUBCUTANEOUS at 05:08

## 2023-08-28 RX ADMIN — INSULIN LISPRO 5 UNITS: 100 INJECTION, SOLUTION INTRAVENOUS; SUBCUTANEOUS at 12:35

## 2023-08-28 RX ADMIN — METRONIDAZOLE 500 MG: 500 TABLET ORAL at 17:31

## 2023-08-28 RX ADMIN — SENNOSIDES AND DOCUSATE SODIUM 2 TABLET: 50; 8.6 TABLET ORAL at 17:26

## 2023-08-28 NOTE — ASSESSMENT & PLAN NOTE
Lab Results   Component Value Date    HGBA1C 6.6 (H) 07/07/2022       Recent Labs     08/27/23  2044 08/28/23  0718 08/28/23  1129 08/28/23  1545   POCGLU 199* 178* 138 135       Blood Sugar Average: Last 72 hrs:  (P) 942.2001024637553216     Podiatry consulted for [place] grade 1 ulcer submetatarsal 1 left foot with cellulitis and concern for septic joint and osteomyelitis on MRI  · S/P Debridement 8/21 and 8/24  · Noted to have elevated CRP, procalcitonin  · Wound culture 8/21 growing 1 + pseudomonas aeruginosa, 2 + enterobacter cloacae  · Wound culture 8/24 growing proteus, enterobacter, enterococcus  · MRI R foot: First submetatarsal head plantar skin ulceration with suspected draining sinus tract extending to the first metatarsal phalangeal joint suspicious for septic arthropathy  · Vascular consulted as imaging showing lower limb diffuse atherosclerotic disease noted throughout with elevated PSV of proximal popliteal artery and tibial peroneal trunk suggesting less than 50% stenosis, metatarsal pressure greater than 200, great toe pressure 95 mmHg within the healing range on right side. Left lower limb showed diffuse atherosclerotic disease noted throughout with a 50 to 75% stenosis of the proximal popliteal artery, metatarsal pressure 170, great toe pressure 98 mmHg within healing range.   · Plan for angiogram by IR tomorrow 8/29/23, npo at midnight

## 2023-08-28 NOTE — PROGRESS NOTES
Progress Note - Vascular Surgery   Rowena Hylton 58 y.o. male MRN: [id number]  Unit/Bed#: [address] [place] [address] Encounter: [id number]    Assessment:  [age]-year-old male with end-stage renal disease and right lower extremity osteomyelitis as well as open wound in the context of having diabetes mellitus and atherosclerotic disease/PAD -stable, AVSS, subacute infection, open wound overlying first metatarsal on right lower extremity, MRI confirming suspicion of osteomyelitis from 8/23/2023, arterial duplex exhibits signs of atherosclerotic disease throughout multiple areas particular to the right lower extremity and the  popliteal artery and tibioperoneal trunk    Plan:  1)   -IR scheduled for tomorrow angiogram and possible angioplasty/stent placement as needed  -Vascular optimization in order to allow for antibiotics and podiatry surgical intervention needed for osteomyelitis and healing of open wound  -Reviewed lower extremity arterial duplex  -Antihypertensive and antihyperlipidemic agents  -Aspirin 81 mg p.o. once daily  -Tight glucose control with sliding scale insulin and upon discharge will need to follow-up with PCP  -IV antibiotics per internal medicine and infectious disease  -We will need to follow-up with wound care  -We will need follow-up as an outpatient with podiatry for preventative measures to avoid future wound development on feet  -Local wound care per podiatry    Subjective/Objective   Chief Complaint: I hear they have things plan for tomorrow    Subjective: Patient was seen and examined at bedside. Patient currently denies any acute events overnight. Patient denies any pain in the right lower extremity. Patient understands that he has a wound present. Patient does annotate some degree of drainage. Patient denies any fevers or chills. Patient still has motor in the lower extremities without any issues. Patient can ambulate on both feet. Patient does have diminished sensation.   Patient denies any worsening pain, redness, swelling, duration. Objective:     Blood pressure 117/51, pulse 56, temperature (!) 97.4 °F (36.3 °C), resp. rate 18, height 6' 1" (1.854 m), weight 115 kg (253 lb), SpO2 98 %. ,Body mass index is 33.38 kg/m². Intake/Output Summary (Last 24 hours) at 8/28/2023 1424  Last data filed at 8/27/2023 2248  Gross per 24 hour   Intake --   Output 350 ml   Net -350 ml       Invasive Devices     Peripheral Intravenous Line  Duration           Peripheral IV 08/27/23 Right Antecubital <1 day          Hemodialysis Catheter  Duration           HD Temporary Double Catheter 3 days                Physical Exam: /51   Pulse 56   Temp (!) 97.4 °F (36.3 °C)   Resp 18   Ht 6' 1" (1.854 m)   Wt 115 kg (253 lb)   SpO2 98%   BMI 33.38 kg/m²   General appearance: alert and oriented, in no acute distress  Head: Normocephalic, without obvious abnormality, atraumatic  Extremities: No significant edema open wound overlying the skull first metatarsal of the right lower extremity, diminished sensory, intact motor, slight erythema over the anterior aspect of right lower extremity foot, left lower extremity foot does have some trace edema surrounding the ankle joint with some mild redness as well no palpable tenderness on left lower extremity, sensation diminished bilaterally  Pulses: Bilateral popliteal and femoral pulses 2+, DP and PT nonpalpable   Skin: Mild erythema on anterior aspect of right lower foot, mild erythema surrounding left lower extremity ankle    Lab, Imaging and other studies:I have personally reviewed pertinent lab results.   ,   VTE Pharmacologic Prophylaxis: Heparin  VTE Mechanical Prophylaxis: sequential compression device

## 2023-08-28 NOTE — ASSESSMENT & PLAN NOTE
Patient sustained a fall about 2 days prior to admission   · Etiology not clear-EKG was unremarkable. Blood sugar was 157. Questionable hypoglycemia versus accidental fall. Patient not exhibiting any signs of stroke. · Telemetry showed no evidence of arrhythmia  · Patient does not remember how he fell. Patient was found on the floor and could not get up.   · PT/OT evaluation and treatment, recommending home with home health  · CAT scan of the brain, CT chest abdomen pelvis, cervical spine and lumbar reconstructions study was ordered in the ED but patient refused the studies  · Patient got CT of the head and neck after receiving Ativan which was unremarkable  · Fall precautions

## 2023-08-28 NOTE — PLAN OF CARE
Problem: PHYSICAL THERAPY ADULT  Goal: Performs mobility at highest level of function for planned discharge setting. See evaluation for individualized goals. Description: Treatment/Interventions: ADL retraining, Functional transfer training, LE strengthening/ROM, Therapeutic exercise, Endurance training, Patient/family training, Bed mobility, Equipment eval/education, Gait training, Compensatory technique education          See flowsheet documentation for full assessment, interventions and recommendations. Outcome: Progressing  Note:    Problem List: Decreased strength, Decreased endurance, Impaired balance, Decreased mobility, Decreased safety awareness, Decreased skin integrity  Assessment: Pt agreeable to PT session this pm. Pt is noted with improved bed mob, trans, gait endurance, balance and gait with the RW. Pt with good ability to maintain weight-bearing to R heel in surgcial shoe. Pt will cont to benefit from skilled PT services to increase pt's strength, gait and mobility. PT Discharge Recommendation: Home with home health rehabilitation    See flowsheet documentation for full assessment.

## 2023-08-28 NOTE — PLAN OF CARE
Problem: Potential for Falls  Goal: Patient will remain free of falls  Description: INTERVENTIONS:  - Educate patient/family on patient safety including physical limitations  - Instruct patient to call for assistance with activity   - Consult OT/PT to assist with strengthening/mobility   - Keep Call bell within reach  - Keep bed low and locked with side rails adjusted as appropriate  - Keep care items and personal belongings within reach  - Initiate and maintain comfort rounds  - Make Fall Risk Sign visible to staff  - Apply yellow socks and bracelet for high fall risk patients  - Consider moving patient to room near nurses station  Outcome: Progressing     Problem: MOBILITY - ADULT  Goal: Maintain or return to baseline ADL function  Description: INTERVENTIONS:  - Educate patient/family on patient safety including physical limitations  - Instruct patient to call for assistance with activity   - Consult OT/PT to assist with strengthening/mobility   - Keep Call bell within reach  - Keep bed low and locked with side rails adjusted as appropriate  - Keep care items and personal belongings within reach  - Initiate and maintain comfort rounds  - Make Fall Risk Sign visible to staff  - Apply yellow socks and bracelet for high fall risk patients  - Consider moving patient to room near nurses station  Outcome: Progressing  Goal: Maintains/Returns to pre admission functional level  Description: INTERVENTIONS:  - Perform BMAT or MOVE assessment daily.   - Set and communicate daily mobility goal to care team and patient/family/caregiver. - Collaborate with rehabilitation services on mobility goals if consulted  - Perform Range of Motion 4 times a day. - Reposition patient every 2 hours.   - Dangle patient 3 times a day  - Stand patient 3 times a day  - Ambulate patient 3 times a day  - Out of bed to chair 3 times a day   - Out of bed for meals 3 times a day  - Out of bed for toileting  - Record patient progress and toleration of activity level   Outcome: Progressing     Problem: PAIN - ADULT  Goal: Verbalizes/displays adequate comfort level or baseline comfort level  Description: Interventions:  - Encourage patient to monitor pain and request assistance  - Assess pain using appropriate pain scale  - Administer analgesics based on type and severity of pain and evaluate response  - Implement non-pharmacological measures as appropriate and evaluate response  - Consider cultural and social influences on pain and pain management  - Notify physician/advanced practitioner if interventions unsuccessful or patient reports new pain  Outcome: Progressing     Problem: INFECTION - ADULT  Goal: Absence or prevention of progression during hospitalization  Description: INTERVENTIONS:  - Assess and monitor for signs and symptoms of infection  - Monitor lab/diagnostic results  - Monitor all insertion sites, i.e. indwelling lines, tubes, and drains  - Monitor endotracheal if appropriate and nasal secretions for changes in amount and color  - [place] appropriate cooling/warming therapies per order  - Administer medications as ordered  - Instruct and encourage patient and family to use good hand hygiene technique  - Identify and instruct in appropriate isolation precautions for identified infection/condition  Outcome: Progressing  Goal: Absence of fever/infection during neutropenic period  Description: INTERVENTIONS:  - Monitor WBC    Outcome: Progressing     Problem: SAFETY ADULT  Goal: Patient will remain free of falls  Description: INTERVENTIONS:  - Educate patient/family on patient safety including physical limitations  - Instruct patient to call for assistance with activity   - Consult OT/PT to assist with strengthening/mobility   - Keep Call bell within reach  - Keep bed low and locked with side rails adjusted as appropriate  - Keep care items and personal belongings within reach  - Initiate and maintain comfort rounds  - Make Fall Risk Sign visible to staff  - Apply yellow socks and bracelet for high fall risk patients  - Consider moving patient to room near nurses station  Outcome: Progressing  Goal: Maintain or return to baseline ADL function  Description: INTERVENTIONS:  - Educate patient/family on patient safety including physical limitations  - Instruct patient to call for assistance with activity   - Consult OT/PT to assist with strengthening/mobility   - Keep Call bell within reach  - Keep bed low and locked with side rails adjusted as appropriate  - Keep care items and personal belongings within reach  - Initiate and maintain comfort rounds  - Make Fall Risk Sign visible to staff  - Apply yellow socks and bracelet for high fall risk patients  - Consider moving patient to room near nurses station  Outcome: Progressing  Goal: Maintains/Returns to pre admission functional level  Description: INTERVENTIONS:  - Perform BMAT or MOVE assessment daily.   - Set and communicate daily mobility goal to care team and patient/family/caregiver. - Collaborate with rehabilitation services on mobility goals if consulted  - Perform Range of Motion 4 times a day. - Reposition patient every 2 hours.   - Dangle patient 3 times a day  - Stand patient 3 times a day  - Ambulate patient 2 times a day  - Out of bed to chair 2 times a day   - Out of bed for meals 2 times a day  - Out of bed for toileting  - Record patient progress and toleration of activity level   Outcome: Progressing     Problem: DISCHARGE PLANNING  Goal: Discharge to home or other facility with appropriate resources  Description: INTERVENTIONS:  - Identify barriers to discharge w/patient and caregiver  - Arrange for needed discharge resources and transportation as appropriate  - Identify discharge learning needs (meds, wound care, etc.)  - Arrange for interpretive services to assist at discharge as needed  - Refer to Case Management Department for coordinating discharge planning if the patient needs post-hospital services based on physician/advanced practitioner order or complex needs related to functional status, cognitive ability, or social support system  Outcome: Progressing     Problem: Knowledge Deficit  Goal: Patient/family/caregiver demonstrates understanding of disease process, treatment plan, medications, and discharge instructions  Description: Complete learning assessment and assess knowledge base. Interventions:  - Provide teaching at level of understanding  - Provide teaching via preferred learning methods  Outcome: Progressing     Problem: METABOLIC, FLUID AND ELECTROLYTES - ADULT  Goal: Electrolytes maintained within normal limits  Description: INTERVENTIONS:  - Monitor labs and assess patient for signs and symptoms of electrolyte imbalances  - Administer electrolyte replacement as ordered  - Monitor response to electrolyte replacements, including repeat lab results as appropriate  - Instruct patient on fluid and nutrition as appropriate  Outcome: Progressing  Goal: Fluid balance maintained  Description: INTERVENTIONS:  - Monitor labs   - Monitor I/O and WT  - Instruct patient on fluid and nutrition as appropriate  - Assess for signs & symptoms of volume excess or deficit  Outcome: Progressing     Problem: Nutrition/Hydration-ADULT  Goal: Nutrient/Hydration intake appropriate for improving, restoring or maintaining nutritional needs  Description: Monitor and assess patient's nutrition/hydration status for malnutrition. Collaborate with interdisciplinary team and initiate plan and interventions as ordered. Monitor patient's weight and dietary intake as ordered or per policy. Utilize nutrition screening tool and intervene as necessary. Determine patient's food preferences and provide high-protein, high-caloric foods as appropriate.      INTERVENTIONS:  - Monitor oral intake, urinary output, labs, and treatment plans  - Assess nutrition and hydration status and recommend course of action  - Evaluate amount of meals eaten  - Assist patient with eating if necessary   - Allow adequate time for meals  - Recommend/ encourage appropriate diets, oral nutritional supplements, and vitamin/mineral supplements  - Order, calculate, and assess calorie counts as needed  - Assess need for intravenous fluids  - Provide specific nutrition/hydration education as appropriate  - Include patient/family/caregiver in decisions related to nutrition  Outcome: Progressing     Problem: SAFETY,RESTRAINT: NV/NON-SELF DESTRUCTIVE BEHAVIOR  Goal: Remains free of harm/injury (restraint for non violent/non self-detsructive behavior)  Description: INTERVENTIONS:  - Instruct patient/family regarding restraint use   - Assess and monitor physiologic and psychological status   - Provide interventions and comfort measures to meet assessed patient needs   - Identify and implement measures to help patient regain control  - Assess readiness for release of restraint   Outcome: Progressing  Goal: Returns to optimal restraint-free functioning  Description: INTERVENTIONS:  - Assess the patient's behavior and symptoms that indicate continued need for restraint  - Identify and implement measures to help patient regain control  - Assess readiness for release of restraint   Outcome: Progressing     Problem: Prexisting or High Potential for Compromised Skin Integrity  Goal: Skin integrity is maintained or improved  Description: INTERVENTIONS:  - Identify patients at risk for skin breakdown  - Assess and monitor skin integrity  - Assess and monitor nutrition and hydration status  - Monitor labs   - Assess for incontinence   - Turn and reposition patient  - Assist with mobility/ambulation  - Relieve pressure over bony prominences  - Avoid friction and shearing  - Provide appropriate hygiene as needed including keeping skin clean and dry  - Evaluate need for skin moisturizer/barrier cream  - Collaborate with interdisciplinary team   - Patient/family teaching  - Consider wound care consult   Outcome: Progressing

## 2023-08-28 NOTE — ASSESSMENT & PLAN NOTE
Lab Results   Component Value Date    HGBA1C 6.6 (H) 07/07/2022       Recent Labs     08/27/23  2044 08/28/23  0718 08/28/23  1129 08/28/23  1545   POCGLU 199* 178* 138 135       Blood Sugar Average: Last 72 hrs:  (P) [id number]   · Patient is on linagliptin at home  · Continue Humalog sliding scale with Accu-Cheks before every meal and at bedtime  · diabetic diet  · lantus 15 hs and lispro 5 units tid with meals

## 2023-08-28 NOTE — PHYSICAL THERAPY NOTE
PT TREATMENT     23 1430   PT Last Visit   PT Visit Date 23   Note Type   Note Type Treatment   Pain Assessment   Pain Assessment Tool 0-10   Pain Score No Pain   Restrictions/Precautions   RLE Weight Bearing Per Order (S)  Other  (heel weight bearing with assistive device per Dr. Rocio Kemp)   Braces or Orthoses Other (Comment)  (surgical shoe)   Other Precautions Fall Risk;Bed Alarm; Chair Alarm  [facility])   General   Chart Reviewed Yes   Family/Caregiver Present No   Cognition   Overall Cognitive Status [facility]   Arousal/Participation Cooperative   Attention Attends with cues to redirect   Following Commands Follows multistep commands with increased time or repetition   Comments At least 2 pt identifiers including name and    Subjective   Subjective "I still feel weak"   Bed Mobility   Supine to Sit 5  Supervision   Additional items Assist x 1;Verbal cues; Increased time required   Transfers   Sit to Stand 5  Supervision   Additional items Assist x 1;Verbal cues; Increased time required  (bed raised)   Stand to Sit 5  Supervision   Additional items Assist x 1;Verbal cues; Increased time required;Armrests   Additional Comments sit to stand and reverse x 3 reps from recliner chair with Sup and use of armrests   Ambulation/Elevation   Gait pattern Foward flexed;Decreased foot clearance; Short stride; Step to  (heel weight bearing RLE in surgcial shoe)   Gait Assistance 5  Supervision   Additional items Assist x 1;Verbal cues; Tactile cues   Assistive Device Rolling walker   Distance 50 feet;rest;40 feet;rest;50 feet   Balance   Static Sitting Good   Static Standing Fair +  (w RW)   Ambulatory Fair  (w RW)   Activity Tolerance   Activity Tolerance Patient limited by fatigue   Assessment   Problem List Decreased strength;Decreased endurance; Impaired balance;Decreased mobility; Decreased safety awareness;Decreased skin integrity   Assessment Pt agreeable to PT session this pm. Pt is noted with improved bed mob, trans, gait endurance, balance and gait with the RW. Pt with good ability to maintain weight-bearing to R heel in surgcial shoe. Pt will cont to benefit from skilled PT services to increase pt's strength, gait and mobility. The patient's AM-PAC Basic Mobility Inpatient Short Form Raw Score is 18. A Raw score of greater than 16 suggests the patient may benefit from discharge to home. Please also refer to the recommendation of the Physical Therapist for safe discharge planning. Plan   Treatment/Interventions ADL retraining;Functional transfer training;LE strengthening/ROM; Therapeutic exercise; Endurance training;Patient/family training;Bed mobility; Equipment eval/education;Gait training; Compensatory technique education   PT Frequency Other (Comment)  (5x/wk)   Recommendation   PT Discharge Recommendation Home with home health rehabilitation   [facility] Mobility Inpatient   Turning in Flat Bed Without Bedrails 4   Lying on Back to Sitting on Edge of Flat Bed Without Bedrails 3   Moving Bed to Chair 3   Standing Up From Chair Using Arms 3   Walk in Room 3   Climb 3-5 Stairs With Railing 2   Basic Mobility Inpatient Raw Score 18   Basic Mobility Standardized Score 41.05   Highest Level Of Mobility   JH-HLM Goal 6: Walk 10 steps or more   JH-HLM Achieved 7: Walk 25 feet or more   Education   Education Provided Mobility training;Assistive device   Patient Explanation/teachback used; Reinforcement needed   End of Consult   Patient Position at End of Consult Bedside chair;Bed/Chair alarm activated; All needs within reach   [facility] Number  Silvia Calvo [place] [id number]

## 2023-08-28 NOTE — ASSESSMENT & PLAN NOTE
· Continue Procardia 30 mg p.o. twice daily, Coreg 12.5 mg p.o. twice daily  · Vital signs as per unit routine

## 2023-08-28 NOTE — PROGRESS NOTES
[id number] [facility]  Progress Note  Name: Haley Lynch  MRN: [id number]  Unit/Bed#: 913 [facility] 421-01 I Date of Admission: 8/19/2023   Date of Service: 8/28/2023 I Hospital Day: 9    Assessment/Plan   * Polymicrobial bacterial infection  Assessment & Plan  Polymicrobial bacteremia.  Enterobacter/Proteus/Streptococcus/Granulicatella  · Suspect cause of patient's initial metabolic encephalopathy which has improved with abx treatment  · ID following  · continue cefepime and flagyl  · Afebrile  · Monitor fever curve and cbc    Diabetic ulcer of left midfoot associated with type 2 diabetes mellitus, limited to breakdown of skin [facility])  Assessment & Plan  Lab Results   Component Value Date    HGBA1C 6.6 (H) 07/07/2022       Recent Labs     08/27/23  2044 08/28/23  0718 08/28/23  1129 08/28/23  1545   POCGLU 199* 178* 138 135       Blood Sugar Average: Last 72 hrs:  (P) [id number]     Podiatry consulted for Delgadillo grade 1 ulcer submetatarsal 1 left foot with cellulitis and concern for septic joint and osteomyelitis on MRI  · S/P Debridement 8/21 and 8/24  · Noted to have elevated CRP, procalcitonin  · Wound culture 8/21 growing 1 + pseudomonas aeruginosa, 2 + enterobacter cloacae  · Wound culture 8/24 growing proteus, enterobacter, enterococcus  · MRI R foot: First submetatarsal head plantar skin ulceration with suspected draining sinus tract extending to the first metatarsal phalangeal joint suspicious for septic arthropathy  · Vascular consulted as imaging showing lower limb diffuse atherosclerotic disease noted throughout with elevated PSV of proximal popliteal artery and tibial peroneal trunk suggesting less than 50% stenosis, metatarsal pressure greater than 200, great toe pressure 95 mmHg within the healing range on right side.   Left lower limb showed diffuse atherosclerotic disease noted throughout with a 50 to 75% stenosis of the proximal popliteal artery, metatarsal pressure 170, great toe pressure 98 mmHg within healing range. · Plan for angiogram by IR tomorrow 8/29/23, npo at midnight    Status post fall  Assessment & Plan  Patient sustained a fall about 2 days prior to admission   · Etiology not clear-EKG was unremarkable. Blood sugar was 157. Questionable hypoglycemia versus accidental fall. Patient not exhibiting any signs of stroke. · Telemetry showed no evidence of arrhythmia  · Patient does not remember how he fell. Patient was found on the floor and could not get up.   · PT/OT evaluation and treatment, recommending home with home health  · CAT scan of the brain, CT chest abdomen pelvis, cervical spine and lumbar reconstructions study was ordered in the ED but patient refused the studies  · Patient got CT of the head and neck after receiving Ativan which was unremarkable  · Fall precautions    Hypertension  Assessment & Plan  · Continue Procardia 30 mg p.o. twice daily, Coreg 12.5 mg p.o. twice daily  · Vital signs as per unit routine    Type 2 diabetes mellitus, without long-term current use of insulin [facility])  Assessment & Plan  Lab Results   Component Value Date    HGBA1C 6.6 (H) 07/07/2022       Recent Labs     08/27/23  2044 08/28/23  0718 08/28/23  1129 08/28/23  1545   POCGLU 199* 178* 138 135       Blood Sugar Average: Last 72 hrs:  (P) [id number]   · Patient is on linagliptin at home  · Continue Humalog sliding scale with Accu-Cheks before every meal and at bedtime  · diabetic diet  · lantus 15 hs and lispro 5 units tid with meals    ESRD (end stage renal disease) [facility])  Assessment & Plan  Lab Results   Component Value Date    EGFR 9 08/27/2023    EGFR 7 08/26/2023    EGFR 5 08/25/2023    CREATININE 5.84 (H) 08/27/2023    CREATININE 6.71 (H) 08/26/2023    CREATININE 8.56 (H) 08/25/2023   · Patient is on Tuesday Thursday Saturday schedule at [facility]  · IJ  permcath removed, temporary  catheter placed 8/25  · Nephrology following  · Will need permcath placed when cleared by ID    Altered mental status-resolved as of 8/28/2023  Assessment & Plan  Resolved  Patient had been confused and more agitated and not following commands and refusing all treatment previous day; at baseline currently  UDS negative  Neurology consulted - MRI negative for acute ischemia  CT of the head and neck with no acute intracranial abnormality with tiny hypodensity in the right posterior inferior putamen likely chronic lacunar infarct dilated prevascular space. Negative CTA for large vessel occlusion dissection or aneurysm with possible tracheobronchomalacia  Discussed with patient's friend-no history of alcohol use or drug use  Ammonia and TSH level were normal  Psychiatry was consulted-recommending Haldol 2 mg p.o. every 4 hours as needed for agitation at 2 mg IM as needed for severe agitation. Started septic work-up also as patient noted to have deep left foot wound-patient has been afebrile and had white count initially which has since normalized. Suspect most symptoms 2/2 infection see below  B12, folate, TSH, ammonia WNL  Mentation appears to be improved; at baseline           VTE Pharmacologic Prophylaxis: VTE Score: 2 Moderate Risk (Score 3-4) - Pharmacological DVT Prophylaxis Ordered: heparin. Patient Centered Rounds: I performed bedside rounds with nursing staff today. Discussions with Specialists or Other Care Team Provider: ID, rn, cm, IR    Education and Discussions with Family / Patient: Patient declined call to . Total Time Spent on Date of Encounter in care of patient: 35 minutes This time was spent on one or more of the following: performing physical exam; counseling and coordination of care; obtaining or reviewing history; documenting in the medical record; reviewing/ordering tests, medications or procedures; communicating with other healthcare professionals and discussing with patient's family/caregivers.     Current Length of Stay: 9 day(s)  Current Patient Status: Inpatient   Certification Statement: The patient will continue to require additional inpatient hospital stay due to bacteremia, IV abx, permacath placement, angiogram, podiatry intervention  Discharge Plan: Anticipate discharge in >72 hrs to discharge location to be determined pending rehab evaluations. Code Status: Level 1 - Full Code    Subjective:   Patient denies any complaints this morning. He is feeling much better. Denies any fever, shortness of breath, or pain. Agreeable to angiogram tomorrow. Objective:     Vitals:   Temp (24hrs), Av °F (36.7 °C), Min:97.4 °F (36.3 °C), Max:98.6 °F (37 °C)    Temp:  [97.4 °F (36.3 °C)-98.6 °F (37 °C)] 97.8 °F (36.6 °C)  HR:  [53-65] 61  Resp:  [17-18] 18  BP: (105-128)/(48-58) 118/52  SpO2:  [95 %-98 %] 96 %  Body mass index is 33.38 kg/m². Input and Output Summary (last 24 hours): Intake/Output Summary (Last 24 hours) at 2023 1649  Last data filed at 2023 2248  Gross per 24 hour   Intake --   Output 350 ml   Net -350 ml       Physical Exam:   Physical Exam  Vitals and nursing note reviewed. Constitutional:       General: He is not in acute distress. Appearance: He is well-developed. Cardiovascular:      Rate and Rhythm: Normal rate and regular rhythm. Pulmonary:      Effort: Pulmonary effort is normal.      Breath sounds: Normal breath sounds. Abdominal:      Palpations: Abdomen is soft. Tenderness: There is no abdominal tenderness. Musculoskeletal:         General: No swelling. Skin:     General: Skin is warm and dry. Capillary Refill: Capillary refill takes less than 2 seconds. Comments: Right foot dressing c/d/i   Neurological:      Mental Status: He is alert.    Psychiatric:         Mood and Affect: Mood normal.         Additional Data:     Labs:  Results from last 7 days   Lab Units 23  0455 23  0414 23  0516   WBC Thousand/uL 10.02   < > 8.06   HEMOGLOBIN g/dL 9.9*   < > 11.6* HEMATOCRIT % 31.4*   < > 36.2*   PLATELETS Thousands/uL 290   < > 230   NEUTROS PCT %  --   --  74   LYMPHS PCT %  --   --  12*   MONOS PCT %  --   --  10   EOS PCT %  --   --  2    < > = values in this interval not displayed. Results from last 7 days   Lab Units 08/27/23  0455   SODIUM mmol/L 131*   POTASSIUM mmol/L 3.8   CHLORIDE mmol/L 95*   CO2 mmol/L 27   BUN mg/dL 36*   CREATININE mg/dL 5.84*   ANION GAP mmol/L 9   CALCIUM mg/dL 7.8*   ALBUMIN g/dL 3.1*   GLUCOSE RANDOM mg/dL 140         Results from last 7 days   Lab Units 08/28/23  1545 08/28/23  1129 08/28/23  0718 08/27/23  2044 08/27/23  1624 08/27/23  1107 08/27/23  0730 08/26/23  2106 08/26/23  1543 08/26/23  1129 08/26/23  0719 08/25/23  2133   POC GLUCOSE mg/dl 135 138 178* 199* 141* 138 146* 159* 155* 84 101 181*         Results from last 7 days   Lab Units 08/22/23  1053   PROCALCITONIN ng/ml 112.62*       Lines/Drains:  Invasive Devices     Peripheral Intravenous Line  Duration           Peripheral IV 08/27/23 Right Antecubital <1 day          Hemodialysis Catheter  Duration           HD Temporary Double Catheter 3 days                      Imaging: No pertinent imaging reviewed. Recent Cultures (last 7 days):   Results from last 7 days   Lab Units 08/24/23  1748 08/24/23  0618 08/21/23  1756   BLOOD CULTURE   --  No Growth After 4 Days. No Growth After 4 Days.   --    GRAM STAIN RESULT  Rare Polys  No bacteria seen  1+ Polys*  1+ Gram positive cocci in pairs*  --  1+ Polys*  1+ Gram positive cocci in pairs*  1+ Gram negative rods*   WOUND CULTURE  1+ Growth of Proteus vulgaris group*  1+ Growth of Enterobacter cloacae*  1+ Growth of Beta Hemolytic Streptococcus Not Group A, B, C, F or G*  --  1+ Growth of Pseudomonas aeruginosa*  2+ Growth of Enterobacter cloacae*  2+ Growth of       Last 24 Hours Medication List:   Current Facility-Administered Medications   Medication Dose Route Frequency Provider Last Rate   • acetaminophen 650 mg Oral Q6H PRN Robert Lopez MD     • acetaminophen  975 mg Oral Q8H Robert Lopez MD     • atorvastatin  80 mg Oral Daily Robert Lopez MD     • carvedilol  12.5 mg Oral BID Robert Lopez MD     • cefepime  1,000 mg Intravenous Q24H Jaren Méndez, DO 1,000 mg (08/27/23 1949)   • famotidine  10 mg Oral Daily Robert Lopez MD     • gabapentin  200 mg Oral BID Jaren Tomario, DO     • haloperidol lactate  2 mg Intramuscular Q6H PRN Jaren Méndez, DO     • heparin (porcine)  5,000 Units Subcutaneous Q8H [facility] Robert Lopez MD     • HYDROmorphone  1 mg Intravenous Q4H PRN Robert Lopez MD     • insulin glargine  15 Units Subcutaneous HS Jaren Méndez DO     • insulin lispro  1-5 Units Subcutaneous HS Robert Lopez MD     • insulin lispro  2-12 Units Subcutaneous TID AC Robert Lopez MD     • insulin lispro  5 Units Subcutaneous TID With Meals Jaren Tomario, DO     • lidocaine  1 patch Topical Daily Robert Lopez MD     • LORazepam  0.5 mg Intravenous Q6H PRN Robert Lopez MD     • methocarbamol  500 mg Oral Q12H PRN Robert Lopez MD     • metroNIDAZOLE  500 mg Oral Q8H [facility] Glenys Honey DO     • NIFEdipine  30 mg Oral BID Robert Lopez MD     • ondansetron  4 mg Intravenous Q6H PRN Robert Lopez MD     • oxyCODONE  5 mg Oral Q8H PRN Robert Lopez MD     • senna-docusate sodium  2 tablet Oral BID Jaren Méndez DO     • sevelamer  800 mg Oral TID With Meals Robert Lopez MD          Today, Patient Was Seen By: Fei Ibarra PA-C    **Please Note: This note may have been constructed using a voice recognition system. **

## 2023-08-28 NOTE — PROGRESS NOTES
Progress Note - Infectious Disease   Alberto Arias 58 y.o. male MRN: [id number]  Unit/Bed#: 913 [facility] 421-01 Encounter: [id number]      Impression/Plan:  1. Polymicrobic bacteremia. In setting of #3, admission blood cultures 1 of 2 sets growing Granulicatella adiacens, Enterobacter cloacae, Proteus vulgaris and Bacteroides fragilis. Patient with a permacath since 1/27/22 as well as an active right foot diabetic ulcer s/p debridement as below. Permacath removed, 8/24/23 cath tip 50 col staph epi, likely contaminant. 8/24/23 TTE no vegetation seen  -continues Cefepime/Flagyl at present  -follow up repeat blood cultures collected 8/24/23  -management as below for #3     2. Encephalopathy. 8/22/23 MRI Brain: limited to motion. No evident acute infarct  Increased altered mental status following HD session 8/23/23  -infectious work up/management as above  -monitor mental status  -neurology on board     3. Right diabetic foot ulcer with cellulitis and concern for septic joint and osteomyelitis on MRI.  8/21/23 Wound cx: 1+ pseudomonas aeruginosa pansensitive, 2+ Enterobacter cloacae, 2+ MSF. 8/23/23 MRI right foot: 1st submetatarsal head plantar ulcer with suspected sinus tract to 1st MTP joint concerning for septic arthropathy and marrow changes of tibia/fibia/sesamoid bones c/w osteomyelitis. Right foot s/p debridement 8/24/23 with tissue cxs with predominant proteus and Enterobacter growth and 1 colony enterococcus   -continue antibiotics as above  -local wound care  -podiatry on boad  -vascular on board  -plan for IR agram tomorrow    4. Leukocytosis, POA. WBC 14 K. In setting of #1/3. Downtrended  -continue antibiotics as above     5. ESRD on HD via right chest permacath placed 1/2023.   -renal dose adjust antibiotic as needed  -volume/HD management per nephrology  -can change temporary to permacath this week.     6. Type 2 Diabetes Mellitus.  HgbA1C 6.6  -monitor symptoms  -symptomatic management per primary care team  -advancing diet as tolerated     Antibiotics:  Cefepime/Flagyl D7     I have discussed the above management plan in detail with patient, RN, and Dr. Kori Parkinson of the primary service. Subjective:  Patient has no fever, chills, sweats reported overnight; no nausea, vomiting, diarrhea; no cough, shortness of breath; no foot pain. For agram tomorrow. Objective:  Vitals:  Temp:  [97.2 °F (36.2 °C)-98.6 °F (37 °C)] 97.8 °F (36.6 °C)  HR:  [53-65] 53  Resp:  [17-18] 18  BP: (105-128)/(48-58) 118/52  SpO2:  [95 %-98 %] 96 %  Temp (24hrs), Av.8 °F (36.6 °C), Min:97.2 °F (36.2 °C), Max:98.6 °F (37 °C)  Current: Temperature: 97.8 °F (36.6 °C)    Physical Exam:   General Appearance:  58year old male, chronically debilitated, nontoxic appearance today, alert and oriented, no acute distress, sitting at bedside with legs dependent on floor. HEENT: Atraumatic normocephalic   Throat: Oropharynx moist. Poor dentition   Pulmonary:   Normal respiratory excursion without accessory muscle use   Cardiac:  RRR   Abdomen:   Soft, non-tender, non-distended, protuberant   Extremities: Venous stained LEs, multiple scabbed abrasions of knees and shins. Right foot gauze/ace wrap intact with some blood-tinged plantar staining strike through. : No gaitan, no SPT   Psychiatric: Awake, cooperative   Skin: No new rashes. IV site nontender. Right chest tempacath site nontender.        Labs, Imaging, & Other studies:   All pertinent labs and imaging studies were personally reviewed  Results from last 7 days   Lab Units 235 23  0855 23  0438   WBC Thousand/uL 10.02 10.55* 9.96   HEMOGLOBIN g/dL 9.9* 10.3* 10.0*   PLATELETS Thousands/uL 290 277 264     Results from last 7 days   Lab Units 23  0455 23  0855 23  0438   SODIUM mmol/L 131* 130* 128*   POTASSIUM mmol/L 3.8 3.8 3.9   CHLORIDE mmol/L 95* 93* 93*   CO2 mmol/L 27 27 22   BUN mg/dL 36* 40* 60*   CREATININE mg/dL 5.84* 6.71* 8.56*   EGFR ml/min/1.73sq m 9 7 5   CALCIUM mg/dL 7.8* 8.0* 8.0*     Results from last 7 days   Lab Units 08/24/23  1748 08/24/23  0629 08/24/23  0618 08/21/23  1756 08/21/23  1627   BLOOD CULTURE   --   --  No Growth After 4 Days. No Growth After 4 Days.   --  Enterobacter cloacae*  Granulicatella adiacens*  Proteus vulgaris*  Alpha Hemolytic Streptococcus*  Bacteroides fragilis*   GRAM STAIN RESULT  Rare Polys  No bacteria seen  1+ Polys*  1+ Gram positive cocci in pairs*  --   --  1+ Polys*  1+ Gram positive cocci in pairs*  1+ Gram negative rods* Gram negative rods*  Gram positive cocci in chains*   WOUND CULTURE  1+ Growth of Proteus vulgaris group*  1+ Growth of Enterobacter cloacae*  1+ Growth of Beta Hemolytic Streptococcus Not Group A, B, C, F or G*  --   --  1+ Growth of Pseudomonas aeruginosa*  2+ Growth of Enterobacter cloacae*  2+ Growth of  --    MRSA CULTURE ONLY   --  No Methicillin Resistant Staphlyococcus aureus (MRSA) isolated  --   --   --      Results from last 7 days   Lab Units 08/22/23  1053   PROCALCITONIN ng/ml 112.62*     Results from last 7 days   Lab Units 08/22/23  1053   CRP mg/L 211.1*     Results from last 7 days   Lab Units 08/22/23  1053   FERRITIN ng/mL 2,584*

## 2023-08-28 NOTE — PLAN OF CARE
Problem: Potential for Falls  Goal: Patient will remain free of falls  Description: INTERVENTIONS:  - Educate patient/family on patient safety including physical limitations  - Instruct patient to call for assistance with activity   - Consult OT/PT to assist with strengthening/mobility   - Keep Call bell within reach  - Keep bed low and locked with side rails adjusted as appropriate  - Keep care items and personal belongings within reach  - Initiate and maintain comfort rounds  - Make Fall Risk Sign visible to staff  - Apply yellow socks and bracelet for high fall risk patients  - Consider moving patient to room near nurses station  Outcome: Progressing     Problem: MOBILITY - ADULT  Goal: Maintain or return to baseline ADL function  Description: INTERVENTIONS:  - Educate patient/family on patient safety including physical limitations  - Instruct patient to call for assistance with activity   - Consult OT/PT to assist with strengthening/mobility   - Keep Call bell within reach  - Keep bed low and locked with side rails adjusted as appropriate  - Keep care items and personal belongings within reach  - Initiate and maintain comfort rounds  - Make Fall Risk Sign visible to staff  - Apply yellow socks and bracelet for high fall risk patients  - Consider moving patient to room near nurses station  Outcome: Progressing  Goal: Maintains/Returns to pre admission functional level  Description: INTERVENTIONS:  - Perform BMAT or MOVE assessment daily.   - Set and communicate daily mobility goal to care team and patient/family/caregiver. - Collaborate with rehabilitation services on mobility goals if consulted  - Perform Range of Motion 12 times a day. - Reposition patient every 2 hours.   - Dangle patient 3 times a day  - Stand patient 3 times a day  - Ambulate patient 3 times a day  - Out of bed to chair 3 times a day   - Out of bed for meals 3 times a day  - Out of bed for toileting  - Record patient progress and toleration of activity level   Outcome: Progressing     Problem: PAIN - ADULT  Goal: Verbalizes/displays adequate comfort level or baseline comfort level  Description: Interventions:  - Encourage patient to monitor pain and request assistance  - Assess pain using appropriate pain scale  - Administer analgesics based on type and severity of pain and evaluate response  - Implement non-pharmacological measures as appropriate and evaluate response  - Consider cultural and social influences on pain and pain management  - Notify physician/advanced practitioner if interventions unsuccessful or patient reports new pain  Outcome: Progressing     Problem: INFECTION - ADULT  Goal: Absence or prevention of progression during hospitalization  Description: INTERVENTIONS:  - Assess and monitor for signs and symptoms of infection  - Monitor lab/diagnostic results  - Monitor all insertion sites, i.e. indwelling lines, tubes, and drains  - Monitor endotracheal if appropriate and nasal secretions for changes in amount and color  - [place] appropriate cooling/warming therapies per order  - Administer medications as ordered  - Instruct and encourage patient and family to use good hand hygiene technique  - Identify and instruct in appropriate isolation precautions for identified infection/condition  Outcome: Progressing     Problem: SAFETY ADULT  Goal: Patient will remain free of falls  Description: INTERVENTIONS:  - Educate patient/family on patient safety including physical limitations  - Instruct patient to call for assistance with activity   - Consult OT/PT to assist with strengthening/mobility   - Keep Call bell within reach  - Keep bed low and locked with side rails adjusted as appropriate  - Keep care items and personal belongings within reach  - Initiate and maintain comfort rounds  - Make Fall Risk Sign visible to staff  - Apply yellow socks and bracelet for high fall risk patients  - Consider moving patient to room near nurses station  Outcome: Progressing  Goal: Maintain or return to baseline ADL function  Description: INTERVENTIONS:  - Educate patient/family on patient safety including physical limitations  - Instruct patient to call for assistance with activity   - Consult OT/PT to assist with strengthening/mobility   - Keep Call bell within reach  - Keep bed low and locked with side rails adjusted as appropriate  - Keep care items and personal belongings within reach  - Initiate and maintain comfort rounds  - Make Fall Risk Sign visible to staff  - Apply yellow socks and bracelet for high fall risk patients  - Consider moving patient to room near nurses station  Outcome: Progressing  Goal: Maintains/Returns to pre admission functional level  Description: INTERVENTIONS:  - Perform BMAT or MOVE assessment daily.   - Set and communicate daily mobility goal to care team and patient/family/caregiver. - Collaborate with rehabilitation services on mobility goals if consulted  - Perform Range of Motion 12 times a day. - Reposition patient every 2 hours.   - Dangle patient 3 times a day  - Stand patient 3 times a day  - Ambulate patient 3 times a day  - Out of bed to chair 3 times a day   - Out of bed for meals 3 times a day  - Out of bed for toileting  - Record patient progress and toleration of activity level   Outcome: Progressing     Problem: DISCHARGE PLANNING  Goal: Discharge to home or other facility with appropriate resources  Description: INTERVENTIONS:  - Identify barriers to discharge w/patient and caregiver  - Arrange for needed discharge resources and transportation as appropriate  - Identify discharge learning needs (meds, wound care, etc.)  - Arrange for interpretive services to assist at discharge as needed  - Refer to Case Management Department for coordinating discharge planning if the patient needs post-hospital services based on physician/advanced practitioner order or complex needs related to functional status, cognitive ability, or social support system  Outcome: Progressing     Problem: Knowledge Deficit  Goal: Patient/family/caregiver demonstrates understanding of disease process, treatment plan, medications, and discharge instructions  Description: Complete learning assessment and assess knowledge base. Interventions:  - Provide teaching at level of understanding  - Provide teaching via preferred learning methods  Outcome: Progressing     Problem: METABOLIC, FLUID AND ELECTROLYTES - ADULT  Goal: Electrolytes maintained within normal limits  Description: INTERVENTIONS:  - Monitor labs and assess patient for signs and symptoms of electrolyte imbalances  - Administer electrolyte replacement as ordered  - Monitor response to electrolyte replacements, including repeat lab results as appropriate  - Instruct patient on fluid and nutrition as appropriate  Outcome: Progressing  Goal: Fluid balance maintained  Description: INTERVENTIONS:  - Monitor labs   - Monitor I/O and WT  - Instruct patient on fluid and nutrition as appropriate  - Assess for signs & symptoms of volume excess or deficit  Outcome: Progressing     Problem: Nutrition/Hydration-ADULT  Goal: Nutrient/Hydration intake appropriate for improving, restoring or maintaining nutritional needs  Description: Monitor and assess patient's nutrition/hydration status for malnutrition. Collaborate with interdisciplinary team and initiate plan and interventions as ordered. Monitor patient's weight and dietary intake as ordered or per policy. Utilize nutrition screening tool and intervene as necessary. Determine patient's food preferences and provide high-protein, high-caloric foods as appropriate.      INTERVENTIONS:  - Monitor oral intake, urinary output, labs, and treatment plans  - Assess nutrition and hydration status and recommend course of action  - Evaluate amount of meals eaten  - Assist patient with eating if necessary   - Allow adequate time for meals  - Recommend/ encourage appropriate diets, oral nutritional supplements, and vitamin/mineral supplements  - Order, calculate, and assess calorie counts as needed  - Assess need for intravenous fluids  - Provide specific nutrition/hydration education as appropriate  - Include patient/family/caregiver in decisions related to nutrition  Outcome: Progressing     Problem: Prexisting or High Potential for Compromised Skin Integrity  Goal: Skin integrity is maintained or improved  Description: INTERVENTIONS:  - Identify patients at risk for skin breakdown  - Assess and monitor skin integrity  - Assess and monitor nutrition and hydration status  - Monitor labs   - Assess for incontinence   - Turn and reposition patient  - Assist with mobility/ambulation  - Relieve pressure over bony prominences  - Avoid friction and shearing  - Provide appropriate hygiene as needed including keeping skin clean and dry  - Evaluate need for skin moisturizer/barrier cream  - Collaborate with interdisciplinary team   - Patient/family teaching  - Consider wound care consult   Outcome: Progressing

## 2023-08-28 NOTE — ASSESSMENT & PLAN NOTE
Resolved  Patient had been confused and more agitated and not following commands and refusing all treatment previous day; at baseline currently  UDS negative  Neurology consulted - MRI negative for acute ischemia  CT of the head and neck with no acute intracranial abnormality with tiny hypodensity in the right posterior inferior putamen likely chronic lacunar infarct dilated prevascular space. Negative CTA for large vessel occlusion dissection or aneurysm with possible tracheobronchomalacia  Discussed with patient's friend-no history of alcohol use or drug use  Ammonia and TSH level were normal  Psychiatry was consulted-recommending Haldol 2 mg p.o. every 4 hours as needed for agitation at 2 mg IM as needed for severe agitation. Started septic work-up also as patient noted to have deep left foot wound-patient has been afebrile and had white count initially which has since normalized.   Suspect most symptoms 2/2 infection see below  B12, folate, TSH, ammonia WNL  Mentation appears to be improved; at baseline

## 2023-08-28 NOTE — PROGRESS NOTES
NEPHROLOGY PROGRESS NOTE   Merritt Woods 58 y.o. male MRN: [id number]  Unit/Bed#: [address] [place] [address] Encounter: [id number]  Reason for Consult: ESRD      SUMMARY:    59 yo man with PMH of ESRD on HD TTS p/w AMS.   Nephrology is consulted for management of ESRD               ASSESSMENT and PLAN:    End Stage Kidney Disease  -Modality:In-Center Hemodialysis  -Schedule: Tuesday/Thursday/Saturday  -Dialysis Unit: [address] [place] [address])  -Access: Right IJ permacath removed, currently with a temporary HD catheter plan for conversion to a permacath this week, if okay with infectious disease  -Presciption: Reviewed  -Status: Last hemodialysis was on Saturday, had an episode of dizziness and lightheadedness despite 0 UF.   Last blood cultures were no growth x72 hours.  -Plan:   · Once infectious disease acceptable to permacath placement we will plan for new permacath placement by IR  · For hemodialysis tomorrow    Polymicrobial bacteremia  --Enterobacter/Proteus/Streptococcus/Granulicatella  -- Permacath removed due to bacteremia but appears that the primary source was right diabetic foot ulceration with septic arthropathy and osteomyelitis  -- If acceptable ID will plan for new permacath placement this week    Hypertension  -- Blood pressure acceptable continue carvedilol and nifedipine    Mineral bone disorder-chronic kidney disease  -- Sevelamer with meals    Anemia of chronic kidney disease  -- Stable    SUBJECTIVE / INTERVAL HISTORY:  No overnight events    OBJECTIVE:  Current Weight: Weight - Scale: 115 kg (253 lb)  Vitals:    08/27/23 1953 08/27/23 2230 08/28/23 0739 08/28/23 0911   BP: 112/50 128/58 105/53 (!) 115/48   BP Location:       Pulse: 64 61 65 62   Resp: 18 17 18    Temp: 98.6 °F (37 °C) 98.1 °F (36.7 °C) (!) 97.4 °F (36.3 °C)    TempSrc:  Oral     SpO2: 95% 97% 98% 96%   Weight:       Height:           Intake/Output Summary (Last 24 hours) at 8/28/2023 0951  Last data filed at 8/27/2023 2248  Gross per 24 hour   Intake 240 ml   Output 350 ml   Net -110 ml       Review of Systems:    Constitutional: Negative for chills and fever. HENT: Negative for ear pain and sore throat. Eyes: Negative for pain and visual disturbance. Respiratory: Negative for cough and shortness of breath. Cardiovascular: Negative for chest pain and palpitations. Gastrointestinal: Negative for abdominal pain and vomiting. Genitourinary: Negative for dysuria and hematuria. Musculoskeletal: Negative for arthralgias and back pain. Skin: Negative for color change and rash. Neurological: Negative for seizures and syncope. 12 point ROS has been reviewed. Physical Exam  Vitals and nursing note reviewed. Constitutional:       General: He is not in acute distress. Appearance: He is obese. He is not ill-appearing, toxic-appearing or diaphoretic. HENT:      Head: Normocephalic and atraumatic. Mouth/Throat:      Pharynx: No oropharyngeal exudate or posterior oropharyngeal erythema. Eyes:      General: No scleral icterus. Right eye: No discharge. Left eye: No discharge. Cardiovascular:      Rate and Rhythm: Normal rate. Pulmonary:      Effort: Pulmonary effort is normal. No respiratory distress. Abdominal:      General: There is no distension. Tenderness: There is no guarding. Musculoskeletal:      Right lower leg: No edema. Left lower leg: No edema. Skin:     General: Skin is dry. Coloration: Skin is pale. Neurological:      Mental Status: Mental status is at baseline.          Medications:    Current Facility-Administered Medications:   •  acetaminophen (TYLENOL) tablet 650 mg, 650 mg, Oral, Q6H PRN, Joel Dalton MD, 650 mg at 08/20/23 1141  •  acetaminophen (TYLENOL) tablet 975 mg, 975 mg, Oral, Q8H, Joel Dalton MD, 975 mg at 08/27/23 0207  •  atorvastatin (LIPITOR) tablet 80 mg, 80 mg, Oral, Daily, Joel Dalton MD, 80 mg at 08/28/23 0915  •  carvedilol (COREG) tablet 12.5 mg, 12.5 mg, Oral, BID, Katerina Teixeira MD, 12.5 mg at 08/28/23 0915  •  cefepime (MAXIPIME) IVPB (premix in dextrose) 1,000 mg 50 mL, 1,000 mg, Intravenous, Q24H, Jaren Méndez DO, Last Rate: 100 mL/hr at 08/27/23 1949, 1,000 mg at 08/27/23 1949  •  famotidine (PEPCID) tablet 10 mg, 10 mg, Oral, Daily, Katerina Teixeira MD, 10 mg at 08/28/23 0915  •  gabapentin (NEURONTIN) oral solution 200 mg, 200 mg, Oral, BID, Jaren Méndez DO, 200 mg at 08/27/23 1812  •  haloperidol lactate (HALDOL) injection 2 mg, 2 mg, Intramuscular, Q6H PRN, Jaren Méndez DO, 2 mg at 08/23/23 1441  •  heparin (porcine) subcutaneous injection 5,000 Units, 5,000 Units, Subcutaneous, Q8H [facility], Katerina Teixeira MD, 5,000 Units at 08/28/23 0508  •  HYDROmorphone (DILAUDID) injection 1 mg, 1 mg, Intravenous, Q4H PRN, Katerina Teixeira MD, 1 mg at 08/25/23 2337  •  insulin glargine (LANTUS) subcutaneous injection 15 Units 0.15 mL, 15 Units, Subcutaneous, HS, Jaren Méndez DO, 15 Units at 08/27/23 2129  •  insulin lispro (HumaLOG) 100 units/mL subcutaneous injection 1-5 Units, 1-5 Units, Subcutaneous, HS, Katerina Teixeira MD, 1 Units at 08/27/23 2130  •  insulin lispro (HumaLOG) 100 units/mL subcutaneous injection 2-12 Units, 2-12 Units, Subcutaneous, TID AC, 2 Units at 08/26/23 1556 **AND** Fingerstick Glucose (POCT), , , TID AC, Randal Dalton MD  •  insulin lispro (HumaLOG) 100 units/mL subcutaneous injection 5 Units, 5 Units, Subcutaneous, TID With Meals, Jaren Méndez DO, 5 Units at 08/27/23 1806  •  lidocaine (LIDODERM) 5 % patch 1 patch, 1 patch, Topical, Daily, Katerina Teixeira MD, 1 patch at 08/25/23 0834  •  LORazepam (ATIVAN) injection 0.5 mg, 0.5 mg, Intravenous, Q6H PRN, Katerina Teixeira MD, 0.5 mg at 08/22/23 1240  •  methocarbamol (ROBAXIN) tablet 500 mg, 500 mg, Oral, Q12H PRN, Katerina Teixeira MD, 500 mg at 08/25/23 1659  •  metroNIDAZOLE (FLAGYL) tablet 500 mg, 500 mg, Oral, Q8H [facility], Jaren Méndez, DO, 500 mg at 08/28/23 0203  •  NIFEdipine (PROCARDIA XL) 24 hr tablet 30 mg, 30 mg, Oral, BID, Lauren Glasgow MD, 30 mg at 08/27/23 1807  •  ondansetron (ZOFRAN) injection 4 mg, 4 mg, Intravenous, Q6H PRN, Lauren Glasgow MD  •  oxyCODONE (ROXICODONE) IR tablet 5 mg, 5 mg, Oral, Q8H PRN, Lauren Glasgow MD, 5 mg at 08/25/23 2221  •  senna-docusate sodium (SENOKOT S) 8.6-50 mg per tablet 2 tablet, 2 tablet, Oral, BID, Jaren Méndez DO, 2 tablet at 08/27/23 1807  •  sevelamer (RENAGEL) tablet 800 mg, 800 mg, Oral, TID With Meals, Lauren Glasgow MD, 800 mg at 08/27/23 1608    Laboratory Results:  Results from last 7 days   Lab Units 08/27/23  0455 08/26/23  0855 08/25/23  0438 08/23/23  0414 08/22/23  0516   WBC Thousand/uL 10.02 10.55* 9.96 8.85 8.06   HEMOGLOBIN g/dL 9.9* 10.3* 10.0* 10.1* 11.6*   HEMATOCRIT % 31.4* 31.9* 32.1* 31.8* 36.2*   PLATELETS Thousands/uL 290 277 264 219 230   POTASSIUM mmol/L 3.8 3.8 3.9 4.3 4.6   CHLORIDE mmol/L 95* 93* 93* 94* 93*   CO2 mmol/L 27 27 22 28 27   BUN mg/dL 36* 40* 60* 62* 64*   CREATININE mg/dL 5.84* 6.71* 8.56* 9.26* 10.01*   CALCIUM mg/dL 7.8* 8.0* 8.0* 8.4 8.9   MAGNESIUM mg/dL 2.2 2.3 2.4 2.6  --    PHOSPHORUS mg/dL 2.7 2.5 2.5 2.5  --        PLEASE NOTE:  This encounter was completed utilizing the [employer] Speech Voice Recognition Software. Grammatical errors, random word insertions, pronoun errors and incomplete sentences are occasional consequences of the system due to software limitations, ambient noise and hardware issues. These may be missed by proof reading prior to affixing electronic signature. Any questions or concerns about the content, text or information contained within the body of this dictation should be directly addressed to the physician for clarification. Please do not hesitate to call me directly if you have any any questions or concerns.

## 2023-08-28 NOTE — CASE MANAGEMENT
Case Management Progress Note    Patient name Lucio James  Location [address]-* MRN [id number]  : 1960 Date 2023       LOS (days): 9  Geometric Mean LOS (GMLOS) (days): 4.70  Days to GMLOS:-4.3        OBJECTIVE:     Current admission status: Inpatient  Preferred Pharmacy:   [id number], [address] [place] [address] [facility]  Phone: [phone] Fax: [phone]    Primary Care Provider: Lam Yanes MD    Primary Insurance: MEDICARE  Secondary Insurance:     PROGRESS NOTE:    SW continues to follow for discharge planning. Patient has been accepted to [facility], his STR of choice. Reservation completed in [address] Facility continues to follow for medical stability.

## 2023-08-28 NOTE — ASSESSMENT & PLAN NOTE
Polymicrobial bacteremia.  Enterobacter/Proteus/Streptococcus/Granulicatella  · Suspect cause of patient's initial metabolic encephalopathy which has improved with abx treatment  · ID following  · continue cefepime and flagyl  · Afebrile  · Monitor fever curve and cbc

## 2023-08-28 NOTE — ASSESSMENT & PLAN NOTE
Lab Results   Component Value Date    EGFR 9 08/27/2023    EGFR 7 08/26/2023    EGFR 5 08/25/2023    CREATININE 5.84 (H) 08/27/2023    CREATININE 6.71 (H) 08/26/2023    CREATININE 8.56 (H) 08/25/2023   · Patient is on Tuesday Thursday Saturday schedule at [facility]  · IJ  permcath removed, temporary  catheter placed 8/25  · Nephrology following  · Will need permcath placed when cleared by ID

## 2023-08-29 ENCOUNTER — APPOINTMENT (INPATIENT)
Dept: DIALYSIS | Facility: HOSPITAL | Age: 63
DRG: 474 | End: 2023-08-29
Attending: STUDENT IN AN ORGANIZED HEALTH CARE EDUCATION/TRAINING PROGRAM
Payer: MEDICARE

## 2023-08-29 ENCOUNTER — APPOINTMENT (OUTPATIENT)
Dept: NON INVASIVE DIAGNOSTICS | Facility: HOSPITAL | Age: 63
DRG: 474 | End: 2023-08-29
Payer: MEDICARE

## 2023-08-29 LAB
ANION GAP SERPL CALCULATED.3IONS-SCNC: 13 MMOL/L
BACTERIA BLD CULT: NORMAL
BACTERIA BLD CULT: NORMAL
BUN SERPL-MCNC: 57 MG/DL (ref 5–25)
CALCIUM SERPL-MCNC: 8.9 MG/DL (ref 8.4–10.2)
CHLORIDE SERPL-SCNC: 95 MMOL/L (ref 96–108)
CO2 SERPL-SCNC: 22 MMOL/L (ref 21–32)
CREAT SERPL-MCNC: 7.97 MG/DL (ref 0.6–1.3)
ERYTHROCYTE [DISTWIDTH] IN BLOOD BY AUTOMATED COUNT: 17.2 % (ref 11.6–15.1)
GFR SERPL CREATININE-BSD FRML MDRD: 6 ML/MIN/1.73SQ M
GLUCOSE SERPL-MCNC: 120 MG/DL (ref 65–140)
GLUCOSE SERPL-MCNC: 130 MG/DL (ref 65–140)
GLUCOSE SERPL-MCNC: 132 MG/DL (ref 65–140)
GLUCOSE SERPL-MCNC: 136 MG/DL (ref 65–140)
GLUCOSE SERPL-MCNC: 141 MG/DL (ref 65–140)
HCT VFR BLD AUTO: 36.8 % (ref 36.5–49.3)
HGB BLD-MCNC: 11.5 G/DL (ref 12–17)
MCH RBC QN AUTO: 29.2 PG (ref 26.8–34.3)
MCHC RBC AUTO-ENTMCNC: 31.3 G/DL (ref 31.4–37.4)
MCV RBC AUTO: 93 FL (ref 82–98)
PLATELET # BLD AUTO: 386 THOUSANDS/UL (ref 149–390)
PMV BLD AUTO: 10 FL (ref 8.9–12.7)
POTASSIUM SERPL-SCNC: 4 MMOL/L (ref 3.5–5.3)
POTASSIUM SERPL-SCNC: 6.2 MMOL/L (ref 3.5–5.3)
RBC # BLD AUTO: 3.94 MILLION/UL (ref 3.88–5.62)
SODIUM SERPL-SCNC: 130 MMOL/L (ref 135–147)
WBC # BLD AUTO: 14.91 THOUSAND/UL (ref 4.31–10.16)

## 2023-08-29 PROCEDURE — 84132 ASSAY OF SERUM POTASSIUM: CPT

## 2023-08-29 PROCEDURE — C1887 CATHETER, GUIDING: HCPCS

## 2023-08-29 PROCEDURE — C1769 GUIDE WIRE: HCPCS

## 2023-08-29 PROCEDURE — 76937 US GUIDE VASCULAR ACCESS: CPT

## 2023-08-29 PROCEDURE — C1760 CLOSURE DEV, VASC: HCPCS

## 2023-08-29 PROCEDURE — 75710 ARTERY X-RAYS ARM/LEG: CPT | Performed by: RADIOLOGY

## 2023-08-29 PROCEDURE — 75710 ARTERY X-RAYS ARM/LEG: CPT

## 2023-08-29 PROCEDURE — C1894 INTRO/SHEATH, NON-LASER: HCPCS

## 2023-08-29 PROCEDURE — 82948 REAGENT STRIP/BLOOD GLUCOSE: CPT

## 2023-08-29 PROCEDURE — 99232 SBSQ HOSP IP/OBS MODERATE 35: CPT | Performed by: INTERNAL MEDICINE

## 2023-08-29 PROCEDURE — B41F1ZZ FLUOROSCOPY OF RIGHT LOWER EXTREMITY ARTERIES USING LOW OSMOLAR CONTRAST: ICD-10-PCS | Performed by: RADIOLOGY

## 2023-08-29 PROCEDURE — 99152 MOD SED SAME PHYS/QHP 5/>YRS: CPT

## 2023-08-29 PROCEDURE — 75625 CONTRAST EXAM ABDOMINL AORTA: CPT

## 2023-08-29 PROCEDURE — 99153 MOD SED SAME PHYS/QHP EA: CPT

## 2023-08-29 PROCEDURE — 99232 SBSQ HOSP IP/OBS MODERATE 35: CPT

## 2023-08-29 PROCEDURE — 99233 SBSQ HOSP IP/OBS HIGH 50: CPT | Performed by: INTERNAL MEDICINE

## 2023-08-29 PROCEDURE — 36247 INS CATH ABD/L-EXT ART 3RD: CPT | Performed by: RADIOLOGY

## 2023-08-29 PROCEDURE — 99152 MOD SED SAME PHYS/QHP 5/>YRS: CPT | Performed by: RADIOLOGY

## 2023-08-29 PROCEDURE — 80048 BASIC METABOLIC PNL TOTAL CA: CPT

## 2023-08-29 PROCEDURE — 75625 CONTRAST EXAM ABDOMINL AORTA: CPT | Performed by: RADIOLOGY

## 2023-08-29 PROCEDURE — 85027 COMPLETE CBC AUTOMATED: CPT

## 2023-08-29 RX ORDER — LIDOCAINE WITH 8.4% SOD BICARB 0.9%(10ML)
SYRINGE (ML) INJECTION AS NEEDED
Status: COMPLETED | OUTPATIENT
Start: 2023-08-29 | End: 2023-08-29

## 2023-08-29 RX ORDER — IODIXANOL 320 MG/ML
85 INJECTION, SOLUTION INTRAVASCULAR
Status: COMPLETED | OUTPATIENT
Start: 2023-08-29 | End: 2023-08-29

## 2023-08-29 RX ORDER — MIDAZOLAM HYDROCHLORIDE 2 MG/2ML
INJECTION, SOLUTION INTRAMUSCULAR; INTRAVENOUS AS NEEDED
Status: COMPLETED | OUTPATIENT
Start: 2023-08-29 | End: 2023-08-29

## 2023-08-29 RX ORDER — VANCOMYCIN HYDROCHLORIDE 1 G/200ML
10 INJECTION, SOLUTION INTRAVENOUS 3 TIMES WEEKLY
Status: DISCONTINUED | OUTPATIENT
Start: 2023-08-31 | End: 2023-08-31

## 2023-08-29 RX ORDER — FENTANYL CITRATE 50 UG/ML
INJECTION, SOLUTION INTRAMUSCULAR; INTRAVENOUS AS NEEDED
Status: COMPLETED | OUTPATIENT
Start: 2023-08-29 | End: 2023-08-29

## 2023-08-29 RX ADMIN — NIFEDIPINE 30 MG: 30 TABLET, EXTENDED RELEASE ORAL at 17:21

## 2023-08-29 RX ADMIN — FENTANYL CITRATE 25 MCG: 50 INJECTION, SOLUTION INTRAMUSCULAR; INTRAVENOUS at 14:09

## 2023-08-29 RX ADMIN — IODIXANOL 85 ML: 320 INJECTION, SOLUTION INTRAVASCULAR at 14:43

## 2023-08-29 RX ADMIN — METRONIDAZOLE 500 MG: 500 TABLET ORAL at 17:30

## 2023-08-29 RX ADMIN — METRONIDAZOLE 500 MG: 500 TABLET ORAL at 01:20

## 2023-08-29 RX ADMIN — HEPARIN SODIUM 5000 UNITS: 5000 INJECTION INTRAVENOUS; SUBCUTANEOUS at 20:40

## 2023-08-29 RX ADMIN — Medication 10 ML: at 13:58

## 2023-08-29 RX ADMIN — MIDAZOLAM 1 MG: 1 INJECTION INTRAMUSCULAR; INTRAVENOUS at 13:46

## 2023-08-29 RX ADMIN — INSULIN GLARGINE 15 UNITS: 100 INJECTION, SOLUTION SUBCUTANEOUS at 20:40

## 2023-08-29 RX ADMIN — METRONIDAZOLE 500 MG: 500 TABLET ORAL at 11:11

## 2023-08-29 RX ADMIN — VANCOMYCIN HYDROCHLORIDE 2000 MG: 10 INJECTION, POWDER, LYOPHILIZED, FOR SOLUTION INTRAVENOUS at 16:20

## 2023-08-29 RX ADMIN — CEFEPIME HYDROCHLORIDE 1000 MG: 1 INJECTION, SOLUTION INTRAVENOUS at 20:40

## 2023-08-29 RX ADMIN — FAMOTIDINE 10 MG: 20 TABLET, FILM COATED ORAL at 08:30

## 2023-08-29 RX ADMIN — FENTANYL CITRATE 25 MCG: 50 INJECTION, SOLUTION INTRAMUSCULAR; INTRAVENOUS at 14:25

## 2023-08-29 RX ADMIN — CARVEDILOL 12.5 MG: 12.5 TABLET, FILM COATED ORAL at 08:30

## 2023-08-29 RX ADMIN — CARVEDILOL 12.5 MG: 12.5 TABLET, FILM COATED ORAL at 17:21

## 2023-08-29 RX ADMIN — ATORVASTATIN CALCIUM 80 MG: 80 TABLET, FILM COATED ORAL at 08:30

## 2023-08-29 RX ADMIN — MIDAZOLAM 1 MG: 1 INJECTION INTRAMUSCULAR; INTRAVENOUS at 13:50

## 2023-08-29 RX ADMIN — GABAPENTIN 200 MG: 250 SUSPENSION ORAL at 17:21

## 2023-08-29 RX ADMIN — SEVELAMER HYDROCHLORIDE 800 MG: 800 TABLET ORAL at 18:38

## 2023-08-29 RX ADMIN — FENTANYL CITRATE 50 MCG: 50 INJECTION, SOLUTION INTRAMUSCULAR; INTRAVENOUS at 13:46

## 2023-08-29 RX ADMIN — INSULIN LISPRO 5 UNITS: 100 INJECTION, SOLUTION INTRAVENOUS; SUBCUTANEOUS at 18:40

## 2023-08-29 RX ADMIN — GABAPENTIN 200 MG: 250 SUSPENSION ORAL at 08:30

## 2023-08-29 RX ADMIN — FENTANYL CITRATE 50 MCG: 50 INJECTION, SOLUTION INTRAMUSCULAR; INTRAVENOUS at 13:50

## 2023-08-29 NOTE — PROGRESS NOTES
NEPHROLOGY PROGRESS NOTE   Rowena Hylton 58 y.o. male MRN: [id number]  Unit/Bed#: [address] [place] [address] Encounter: [id number]  Reason for Consult: ESRD      SUMMARY:    59 yo man with PMH of ESRD on HD TTS p/w AMS.   Nephrology is consulted for management of ESRD                ASSESSMENT and PLAN:     End Stage Kidney Disease  -Modality:In-Center Hemodialysis  -Schedule: Tuesday/Thursday/Saturday  -Dialysis Unit: [address] [place] [address])  -Access: Right IJ permacath removed, currently with a temporary HD catheter plan for conversion to a permacath this week, if okay with infectious disease  -Presciption: Reviewed  -Status:  Latest blood cultures are no growth. -Plan:   • Sent a Tiger text message infection disease to confirm whether be okay to place a permacath  • For hemodialysis today  • Plan for angiogram today     Polymicrobial bacteremia  --Enterobacter/Proteus/Streptococcus/Granulicatella  -- Permacath removed due to bacteremia but appears that the primary source was right diabetic foot ulceration with septic arthropathy and osteomyelitis  -- If acceptable ID will plan for new permacath placement this week  --Plan for angiogram today     Hypertension  -- Continue carvedilol and nifedipine     Mineral bone disorder-chronic kidney disease  -- Sevelamer with meals     Anemia of chronic kidney disease  -- Stable      SUBJECTIVE / INTERVAL HISTORY:    No chest pain or shortness of breath he is more awake today than yesterday.     OBJECTIVE:  Current Weight: Weight - Scale: 115 kg (253 lb)  Vitals:    08/28/23 1830 08/28/23 2207 08/29/23 0200 08/29/23 0822   BP: 132/56 143/57  126/72   Pulse: 56 56  64   Resp: 18 18  17   Temp: (!) 96.8 °F (36 °C) (!) 96.4 °F (35.8 °C) 97.8 °F (36.6 °C) (!) 95.9 °F (35.5 °C)   TempSrc:       SpO2: 97% 97%  97%   Weight:       Height:           Intake/Output Summary (Last 24 hours) at 8/29/2023 0829  Last data filed at 8/29/2023 0700  Gross per 24 hour   Intake 240 ml   Output 900 ml Net -660 ml       Review of Systems:    Constitutional: Negative for chills and fever. HENT: Negative for ear pain and sore throat. Eyes: Negative for pain and visual disturbance. Respiratory: Negative for cough and shortness of breath. Cardiovascular: Negative for chest pain and palpitations. Gastrointestinal: Negative for abdominal pain and vomiting. Genitourinary: Negative for dysuria and hematuria. Musculoskeletal: Negative for arthralgias and back pain. Skin: Negative for color change and rash. Neurological: Negative for seizures and syncope. 12 point ROS has been reviewed. Physical Exam  Vitals and nursing note reviewed. Constitutional:       General: He is not in acute distress. Appearance: He is well-developed. HENT:      Head: Normocephalic and atraumatic. Eyes:      General: No scleral icterus. Conjunctiva/sclera: Conjunctivae normal.      Pupils: Pupils are equal, round, and reactive to light. Cardiovascular:      Rate and Rhythm: Normal rate and regular rhythm. Heart sounds: S1 normal and S2 normal. No murmur heard. No friction rub. No gallop. Pulmonary:      Effort: Pulmonary effort is normal. No respiratory distress. Breath sounds: Normal breath sounds. No wheezing or rales. Abdominal:      General: Bowel sounds are normal.      Palpations: Abdomen is soft. Tenderness: There is no abdominal tenderness. There is no rebound. Musculoskeletal:         General: Normal range of motion. Cervical back: Normal range of motion and neck supple. Skin:     Findings: No rash. Neurological:      Mental Status: He is alert and oriented to person, place, and time.    Psychiatric:         Behavior: Behavior normal.         Medications:    Current Facility-Administered Medications:   •  acetaminophen (TYLENOL) tablet 650 mg, 650 mg, Oral, Q6H PRN, Joel Dlaton MD, 650 mg at 08/20/23 1141  •  acetaminophen (TYLENOL) tablet 975 mg, 975 mg, Oral, Q8H, Andrei Webster MD, 975 mg at 08/28/23 1208  •  atorvastatin (LIPITOR) tablet 80 mg, 80 mg, Oral, Daily, Andrei Webster MD, 80 mg at 08/28/23 0915  •  carvedilol (COREG) tablet 12.5 mg, 12.5 mg, Oral, BID, Joel Dalton MD, 12.5 mg at 08/28/23 1709  •  cefepime (MAXIPIME) IVPB (premix in dextrose) 1,000 mg 50 mL, 1,000 mg, Intravenous, Q24H, Jaren Méndez DO, Last Rate: 100 mL/hr at 08/28/23 2030, 1,000 mg at 08/28/23 2030  •  famotidine (PEPCID) tablet 10 mg, 10 mg, Oral, Daily, Andrei Webster MD, 10 mg at 08/28/23 0915  •  gabapentin (NEURONTIN) oral solution 200 mg, 200 mg, Oral, BID, Jaren Méndez DO, 200 mg at 08/28/23 1803  •  haloperidol lactate (HALDOL) injection 2 mg, 2 mg, Intramuscular, Q6H PRN, Jaren Méndez DO, 2 mg at 08/23/23 1441  •  heparin (porcine) subcutaneous injection 5,000 Units, 5,000 Units, Subcutaneous, Q8H [address], Andrei Webster MD, 5,000 Units at 08/28/23 2121  •  HYDROmorphone (DILAUDID) injection 1 mg, 1 mg, Intravenous, Q4H PRN, Andrei Webster MD, 1 mg at 08/25/23 2337  •  insulin glargine (LANTUS) subcutaneous injection 15 Units 0.15 mL, 15 Units, Subcutaneous, HS, Jaren Méndez DO, 15 Units at 08/28/23 2121  •  insulin lispro (HumaLOG) 100 units/mL subcutaneous injection 1-5 Units, 1-5 Units, Subcutaneous, HS, Andrei Webster MD, 1 Units at 08/28/23 2122  •  insulin lispro (HumaLOG) 100 units/mL subcutaneous injection 2-12 Units, 2-12 Units, Subcutaneous, TID AC, 2 Units at 08/26/23 1556 **AND** Fingerstick Glucose (POCT), , , TID AC, Joel Dalton MD  •  insulin lispro (HumaLOG) 100 units/mL subcutaneous injection 5 Units, 5 Units, Subcutaneous, TID With Meals, Jaren Méndez DO, 5 Units at 08/28/23 1728  •  lidocaine (LIDODERM) 5 % patch 1 patch, 1 patch, Topical, Daily, Joel Dalton MD, 1 patch at 08/25/23 0834  •  LORazepam (ATIVAN) injection 0.5 mg, 0.5 mg, Intravenous, Q6H PRN, Andrei Webster MD, 0.5 mg at 08/22/23 1240  • methocarbamol (ROBAXIN) tablet 500 mg, 500 mg, Oral, Q12H PRN, Aditya Garibay MD, 500 mg at 08/25/23 1659  •  metroNIDAZOLE (FLAGYL) tablet 500 mg, 500 mg, Oral, Q8H [address], Jaren Méndez DO, 500 mg at 08/29/23 0120  •  NIFEdipine (PROCARDIA XL) 24 hr tablet 30 mg, 30 mg, Oral, BID, Joel Dalton MD, 30 mg at 08/28/23 1726  •  ondansetron (ZOFRAN) injection 4 mg, 4 mg, Intravenous, Q6H PRN, Aditya Garibay MD  •  oxyCODONE (ROXICODONE) IR tablet 5 mg, 5 mg, Oral, Q8H PRN, Aditya Garibay MD, 5 mg at 08/25/23 2221  •  senna-docusate sodium (SENOKOT S) 8.6-50 mg per tablet 2 tablet, 2 tablet, Oral, BID, Jaren Méndez DO, 2 tablet at 08/28/23 1726  •  sevelamer (RENAGEL) tablet 800 mg, 800 mg, Oral, TID With Meals, Aditya Garibay MD, 800 mg at 08/28/23 1708    Laboratory Results:  Results from last 7 days   Lab Units 08/29/23  0501 08/27/23  0455 08/26/23  0855 08/25/23  0438 08/23/23  0414   WBC Thousand/uL 14.91* 10.02 10.55* 9.96 8.85   HEMOGLOBIN g/dL 11.5* 9.9* 10.3* 10.0* 10.1*   HEMATOCRIT % 36.8 31.4* 31.9* 32.1* 31.8*   PLATELETS Thousands/uL 386 290 277 264 219   POTASSIUM mmol/L 6.2* 3.8 3.8 3.9 4.3   CHLORIDE mmol/L 95* 95* 93* 93* 94*   CO2 mmol/L 22 27 27 22 28   BUN mg/dL 57* 36* 40* 60* 62*   CREATININE mg/dL 7.97* 5.84* 6.71* 8.56* 9.26*   CALCIUM mg/dL 8.9 7.8* 8.0* 8.0* 8.4   MAGNESIUM mg/dL  --  2.2 2.3 2.4 2.6   PHOSPHORUS mg/dL  --  2.7 2.5 2.5 2.5       PLEASE NOTE:  This encounter was completed utilizing the My[employer]/Fluency Direct Speech Voice Recognition Software. Grammatical errors, random word insertions, pronoun errors and incomplete sentences are occasional consequences of the system due to software limitations, ambient noise and hardware issues. These may be missed by proof reading prior to affixing electronic signature.  Any questions or concerns about the content, text or information contained within the body of this dictation should be directly addressed to the physician for clarification. Please do not hesitate to call me directly if you have any any questions or concerns.

## 2023-08-29 NOTE — PROGRESS NOTES
Progress Note - Infectious Disease   Lydia Childzahra 58 y.o. male MRN: [id number]  Unit/Bed#: [address] [facility] 421-01 Encounter: [id number]      Impression/Plan:  1. Polymicrobic bacteremia. In setting of #3, admission blood cultures 1 of 2 sets grew Granulicatella adiacens, Enterobacter cloacae, Proteus vulgaris and Bacteroides fragilis. Patient with a permacath since 1/27/22 as well as an active right foot diabetic ulcer s/p debridement as below. Permacath removed, 8/24/23 cath tip 50 col staph epi, likely contaminant. 8/24/23 TTE no vegetation seen  -continues Cefepime/Flagyl at present  -add back Vancomycin, dosed by Pharmacy  -follow up repeat blood cultures collected 8/24/23  -follow up Granulicatella sensitivities as possible  -management as below for #3     2. Encephalopathy. 8/22/23 MRI Brain: limited to motion. No evident acute infarct  Increased altered mental status following HD session 8/23/23. improved  -infectious work up/management as above  -monitor mental status  -neurology on board     3. Right diabetic foot ulcer with cellulitis and concern for septic joint and osteomyelitis on MRI.  8/21/23 Wound cx: 1+ pseudomonas aeruginosa pansensitive, 2+ Enterobacter cloacae, 2+ MSF. 8/23/23 MRI right foot: 1st submetatarsal head plantar ulcer with suspected sinus tract to 1st MTP joint concerning for septic arthropathy and marrow changes of tibia/fibia/sesamoid bones c/w osteomyelitis. Right foot s/p debridement 8/24/23 with tissue cxs with predominant proteus and Enterobacter growth and 1 colony enterococcus faecalis  -continue antibiotics as above  -local wound care  -podiatry on boad  -vascular on board  -follow IR agram for today    4. Leukocytosis, POA. WBC 14 K. In setting of #1/3.   -continue antibiotics as above     5. ESRD on HD via right chest permacath placed 1/2023.   -renal dose adjust antibiotic as needed  -volume/HD management per nephrology  -can change temporary to permacath this week.     6.  Type 2 Diabetes Mellitus. HgbA1C 6.6  -monitor symptoms  -symptomatic management per primary care team  -advancing diet as tolerated     Antibiotics:  Cefepime/Flagyl D8     I have discussed the above management plan in detail with patient, microbiologist, RN, Dr. Bert De La Cruz, and Dr. Meredith Cabrera of the primary service. I have spent a total time of 50 minutes on 23 in caring for this patient including Diagnostic results, Prognosis, Risks and benefits of tx options, Instructions for management, Patient and family education, Importance of tx compliance, Risk factor reductions, Impressions, Counseling / Coordination of care, Documenting in the medical record, Reviewing / ordering tests, medicine, procedures  , Obtaining or reviewing history   and Communicating with other healthcare professionals . Subjective:  Patient has no fever, chills, sweats reported overnight; no nausea, vomiting, diarrhea; no cough, shortness of breath; no foot pain. For agram today. Objective:  Vitals:  Temp:  [95.9 °F (35.5 °C)-97.8 °F (36.6 °C)] 97.5 °F (36.4 °C)  HR:  [53-64] 61  Resp:  [17-18] 18  BP: (117-145)/(51-72) 145/62  SpO2:  [95 %-99 %] 99 %  Temp (24hrs), Av °F (36.1 °C), Min:95.9 °F (35.5 °C), Max:97.8 °F (36.6 °C)  Current: Temperature: 97.5 °F (36.4 °C)    Physical Exam:   General Appearance:  58year old male, chronically debilitated, nontoxic appearance today, alert and oriented, no acute distress, resting in bed during HD   HEENT: Atraumatic normocephalic   Throat: Oropharynx moist. Poor dentition   Pulmonary:   Normal respiratory excursion without accessory muscle use   Cardiac:  RRR   Abdomen:   Soft, non-tender, non-distended, protuberant   Extremities: Venous stained LEs, multiple scabbed abrasions of knees and shins. Right foot gauze/ace wrap intact with scant blood-tinged plantar staining strike through. : No gaitan, no SPT   Psychiatric: Awake, cooperative   Skin: No new rashes. IV site nontender.  Right chest [facility] site nontender, accessed       Labs, Imaging, & Other studies:   All pertinent labs and imaging studies were personally reviewed  Results from last 7 days   Lab Units 08/29/23  0501 08/27/23  0455 08/26/23  0855   WBC Thousand/uL 14.91* 10.02 10.55*   HEMOGLOBIN g/dL 11.5* 9.9* 10.3*   PLATELETS Thousands/uL 386 290 277     Results from last 7 days   Lab Units 08/29/23  0501 08/27/23  0455 08/26/23  0855   SODIUM mmol/L 130* 131* 130*   POTASSIUM mmol/L 6.2* 3.8 3.8   CHLORIDE mmol/L 95* 95* 93*   CO2 mmol/L 22 27 27   BUN mg/dL 57* 36* 40*   CREATININE mg/dL 7.97* 5.84* 6.71*   EGFR ml/min/1.73sq m 6 9 7   CALCIUM mg/dL 8.9 7.8* 8.0*     Results from last 7 days   Lab Units 08/24/23  1748 08/24/23  0629 08/24/23  0618   BLOOD CULTURE   --   --  No Growth After 5 Days. No Growth After 5 Days.    GRAM STAIN RESULT  Rare Polys  No bacteria seen  1+ Polys*  1+ Gram positive cocci in pairs*  --   --    WOUND CULTURE  1+ Growth of Proteus vulgaris group*  1+ Growth of Enterobacter cloacae*  1+ Growth of Beta Hemolytic Streptococcus Not Group A, B, C, F or G*  --   --    MRSA CULTURE ONLY   --  No Methicillin Resistant Staphlyococcus aureus (MRSA) isolated  --

## 2023-08-29 NOTE — ASSESSMENT & PLAN NOTE
Lab Results   Component Value Date    EGFR 6 08/29/2023    EGFR 9 08/27/2023    EGFR 7 08/26/2023    CREATININE 7.97 (H) 08/29/2023    CREATININE 5.84 (H) 08/27/2023    CREATININE 6.71 (H) 08/26/2023   · Patient is on Tuesday Thursday Saturday schedule at [facility]  · IJ  permcath removed, temporary  catheter placed 8/25  · Nephrology following  · Will need permcath placed when cleared by ID

## 2023-08-29 NOTE — PLAN OF CARE
Patient presents for a 3.5 hour HD session on a 2K2.5Ca bath for a serum potassium of 6.2 mmol/L drawn today, new specimen drawn/results pending, with a net UF goal of 2 L as tolerated per discussion with Dr. Dolores Kohler.    Post-Dialysis RN Treatment Note    Blood Pressure:  Pre 142/57 mm/Hg  Post 154/64 mmHg   EDW  TBD kg    Weight:  Pre 108.5 kg   Post 106.7 kg   Mode of weight measurement: Bed Scale   Volume Removed  1500 ml    Treatment duration 210 minutes    NS given  No    Treatment shortened?  No   Medications given during Rx None Reported   Estimated Kt/V  Not Applicable   Access type: Temporary HD catheter   Access Issues: Yes, describe: Lines reversed    Report called to primary nurse   Yes, Marianne Arreola RN    Problem: METABOLIC, FLUID AND ELECTROLYTES - ADULT  Goal: Electrolytes maintained within normal limits  Description: INTERVENTIONS:  - Monitor labs and assess patient for signs and symptoms of electrolyte imbalances  - Administer electrolyte replacement as ordered  - Monitor response to electrolyte replacements, including repeat lab results as appropriate  - Instruct patient on fluid and nutrition as appropriate  Outcome: Progressing  Goal: Fluid balance maintained  Description: INTERVENTIONS:  - Monitor labs   - Monitor I/O and WT  - Instruct patient on fluid and nutrition as appropriate  - Assess for signs & symptoms of volume excess or deficit  Outcome: Progressing

## 2023-08-29 NOTE — ASSESSMENT & PLAN NOTE
Polymicrobial bacteremia.  Enterobacter/Proteus/Streptococcus/Granulicatella  · Suspect cause of patient's initial metabolic encephalopathy which has improved with abx treatment  · ID following  · continue cefepime and flagyl  · Started on vancomycin today  · Afebrile  · Monitor fever curve and cbc

## 2023-08-29 NOTE — SEDATION DOCUMENTATION
Angiogram completed with Dr. Erika Jimenez. Pt tolerated. Bedrest ordered for 4 hours start time 1440. Dressing to site. No bleeding or hematoma noted. Pt transported back to room.

## 2023-08-29 NOTE — ASSESSMENT & PLAN NOTE
Lab Results   Component Value Date    HGBA1C 6.6 (H) 07/07/2022       Recent Labs     08/28/23 2010 08/29/23  0713 08/29/23  1056 08/29/23  1609   POCGLU 161* 130 132 136       Blood Sugar Average: Last 72 hrs:  (P) 142.2     Podiatry consulted for [place] grade 1 ulcer submetatarsal 1 left foot with cellulitis and concern for septic joint and osteomyelitis on MRI  · S/P Debridement 8/21 and 8/24  · Noted to have elevated CRP, procalcitonin  · Wound culture 8/21 growing 1 + pseudomonas aeruginosa, 2 + enterobacter cloacae  · Wound culture 8/24 growing proteus, enterobacter, enterococcus  · MRI R foot: First submetatarsal head plantar skin ulceration with suspected draining sinus tract extending to the first metatarsal phalangeal joint suspicious for septic arthropathy  · Vascular consulted as imaging showing lower limb diffuse atherosclerotic disease noted throughout with elevated PSV of proximal popliteal artery and tibial peroneal trunk suggesting less than 50% stenosis, metatarsal pressure greater than 200, great toe pressure 95 mmHg within the healing range on right side. Left lower limb showed diffuse atherosclerotic disease noted throughout with a 50 to 75% stenosis of the proximal popliteal artery, metatarsal pressure 170, great toe pressure 98 mmHg within healing range.   · Plan for angiogram by IR today 8/29/23

## 2023-08-29 NOTE — PLAN OF CARE
Problem: MOBILITY - ADULT  Goal: Maintain or return to baseline ADL function  Description: INTERVENTIONS:  - Educate patient/family on patient safety including physical limitations  - Instruct patient to call for assistance with activity   - Consult OT/PT to assist with strengthening/mobility   - Keep Call bell within reach  - Keep bed low and locked with side rails adjusted as appropriate  - Keep care items and personal belongings within reach  - Initiate and maintain comfort rounds  - Make Fall Risk Sign visible to staff  - Apply yellow socks and bracelet for high fall risk patients  - Consider moving patient to room near nurses station  Outcome: Progressing     Problem: PAIN - ADULT  Goal: Verbalizes/displays adequate comfort level or baseline comfort level  Description: Interventions:  - Encourage patient to monitor pain and request assistance  - Assess pain using appropriate pain scale  - Administer analgesics based on type and severity of pain and evaluate response  - Implement non-pharmacological measures as appropriate and evaluate response  - Consider cultural and social influences on pain and pain management  - Notify physician/advanced practitioner if interventions unsuccessful or patient reports new pain  Outcome: Progressing

## 2023-08-29 NOTE — ASSESSMENT & PLAN NOTE
Lab Results   Component Value Date    HGBA1C 6.6 (H) 07/07/2022       Recent Labs     08/28/23 2010 08/29/23  0713 08/29/23  1056 08/29/23  1609   POCGLU 161* 130 132 136       Blood Sugar Average: Last 72 hrs:  (P) 142.2   · Patient is on linagliptin at home  · Continue Humalog sliding scale with Accu-Cheks before every meal and at bedtime  · diabetic diet  · lantus 15 hs and lispro 5 units tid with meals

## 2023-08-29 NOTE — PHYSICAL THERAPY NOTE
PHYSICAL THERAPY     08/29/23 1617   Note Type   Note Type Cancelled Session   Cancel Reasons Other  (Physical therapy attempted x2. Dialysis in a.m. and angiogram in p.m.   Will reattempt at a later time as appropriate)   Licensure   [place] License Number  Becky Pascal, PT 4 0 QA [id number]

## 2023-08-29 NOTE — PROGRESS NOTES
Mo Barton is a 58 y.o. male who is currently ordered Vancomycin IV with management by the Pharmacy Consult service. Relevant clinical data and objective / subjective history reviewed. Vancomycin Assessment:  Indication and Goal AUC/Trough: Bone/joint infection (goal -600, trough >10), -600, trough >10  Clinical Status: stable  Micro:     Renal Function:  SCr: 7.97 mg/dL  CrCl: 12.4 mL/min  Renal replacement: HD  Days of Therapy: 1  Current Dose: 2000 mg IV x 1 dose (loading dose)  Vancomycin Plan:  New Dosin mg IV 3 times weekly post HD on Tuesday, Thursday, Saturday    Next Level: 23 at 0600  Renal Function Monitoring: Daily BMP and [place] will continue to follow closely for s/sx of nephrotoxicity, infusion reactions and appropriateness of therapy. BMP and CBC will be ordered per protocol. We will continue to follow the patient’s culture results and clinical progress daily.     Camila Graves, Pharmacist

## 2023-08-29 NOTE — BRIEF OP NOTE (RAD/CATH)
INTERVENTIONAL RADIOLOGY PROCEDURE NOTE    Date: 8/29/2023    Procedure: IR LOWER EXTREMITY ANGIOGRAM     Preoperative diagnosis:   1. ESRD (end stage renal disease) (HCC)    2. Elevated CK    3. Hypomagnesemia    4. Hypophosphatemia    5. Altered mental status    6. AMS (altered mental status)    7. Open wound of plantar aspect of right foot    8. Sepsis ([address])    9. Bacteremia    10. PAD (peripheral artery disease) ([address])    11. 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 mellitus, with bone involvement without evidence of necrosis ([address])    12. Cellulitis of right foot    13. Polymicrobial bacterial infection    14. Subacute osteomyelitis of right foot (HCC)         Postoperative diagnosis: Same. Surgeon: Adriana Hensley MD     Assistant: None. No qualified resident was available. Blood loss: minim    Specimens: none     Findings: Normal RLE angiogram except for mild distal popliteal atherosclerosis and mild focal narrowing of the origin of the anterior tibial artery. Otherwise widely patent 3 vessel run-off to the foot. Normal aortogram and pelvic angiogram.    Complications: None immediate.     Anesthesia: conscious sedation

## 2023-08-29 NOTE — ASSESSMENT & PLAN NOTE
Patient sustained a fall about 2 days prior to admission   · Etiology not clear-EKG was unremarkable. Blood sugar was 157. Questionable hypoglycemia versus accidental fall. Patient not exhibiting any signs of stroke. · Telemetry showed no evidence of arrhythmia  · Patient does not remember how he fell. Patient was found on the floor and could not get up.   · PT/OT evaluation and treatment, recommending home with home health  · CAT scan of the brain, CT chest abdomen pelvis, cervical spine and lumbar reconstructions study was ordered in the ED but patient refused the studies  · Patient got CT of the head and neck after receiving Ativan which was unremarkable  · Fall precautions Finasteride Counseling:  I discussed with the patient the risks of use of finasteride including but not limited to decreased libido, decreased ejaculate volume, gynecomastia, and depression. Women should not handle medication.  All of the patient's questions and concerns were addressed. Finasteride Male Counseling: Finasteride Counseling:  I discussed with the patient the risks of use of finasteride including but not limited to decreased libido, decreased ejaculate volume, gynecomastia, and depression. Women should not handle medication.  All of the patient's questions and concerns were addressed.

## 2023-08-29 NOTE — PROGRESS NOTES
[id number] [facility]  Progress Note  Name: Lawanda Verma  MRN: [id number]  Unit/Bed#: 913 [facility] 421-01 I Date of Admission: 8/19/2023   Date of Service: 8/29/2023 I Hospital Day: 10    Assessment/Plan   * Polymicrobial bacterial infection  Assessment & Plan  Polymicrobial bacteremia.  Enterobacter/Proteus/Streptococcus/Granulicatella  · Suspect cause of patient's initial metabolic encephalopathy which has improved with abx treatment  · ID following  · continue cefepime and flagyl  · Started on vancomycin today  · Afebrile  · Monitor fever curve and cbc    Diabetic ulcer of left midfoot associated with type 2 diabetes mellitus, limited to breakdown of skin [facility])  Assessment & Plan  Lab Results   Component Value Date    HGBA1C 6.6 (H) 07/07/2022       Recent Labs     08/28/23 2010 08/29/23  0713 08/29/23  1056 08/29/23  1609   POCGLU 161* 130 132 136       Blood Sugar Average: Last 72 hrs:  (P) 142.2     Podiatry consulted for [place] grade 1 ulcer submetatarsal 1 left foot with cellulitis and concern for septic joint and osteomyelitis on MRI  · S/P Debridement 8/21 and 8/24  · Noted to have elevated CRP, procalcitonin  · Wound culture 8/21 growing 1 + pseudomonas aeruginosa, 2 + enterobacter cloacae  · Wound culture 8/24 growing proteus, enterobacter, enterococcus  · MRI R foot: First submetatarsal head plantar skin ulceration with suspected draining sinus tract extending to the first metatarsal phalangeal joint suspicious for septic arthropathy  · Vascular consulted as imaging showing lower limb diffuse atherosclerotic disease noted throughout with elevated PSV of proximal popliteal artery and tibial peroneal trunk suggesting less than 50% stenosis, metatarsal pressure greater than 200, great toe pressure 95 mmHg within the healing range on right side.   Left lower limb showed diffuse atherosclerotic disease noted throughout with a 50 to 75% stenosis of the proximal popliteal artery, metatarsal pressure 170, great toe pressure 98 mmHg within healing range. · Plan for angiogram by IR today 8/29/23    Status post fall  Assessment & Plan  Patient sustained a fall about 2 days prior to admission   · Etiology not clear-EKG was unremarkable. Blood sugar was 157. Questionable hypoglycemia versus accidental fall. Patient not exhibiting any signs of stroke. · Telemetry showed no evidence of arrhythmia  · Patient does not remember how he fell. Patient was found on the floor and could not get up.   · PT/OT evaluation and treatment, recommending home with home health  · CAT scan of the brain, CT chest abdomen pelvis, cervical spine and lumbar reconstructions study was ordered in the ED but patient refused the studies  · Patient got CT of the head and neck after receiving Ativan which was unremarkable  · Fall precautions    Hypertension  Assessment & Plan  · Continue Procardia 30 mg p.o. twice daily, Coreg 12.5 mg p.o. twice daily  · Vital signs as per unit routine    Type 2 diabetes mellitus, without long-term current use of insulin [facility])  Assessment & Plan  Lab Results   Component Value Date    HGBA1C 6.6 (H) 07/07/2022       Recent Labs     08/28/23 2010 08/29/23  0713 08/29/23  1056 08/29/23  1609   POCGLU 161* 130 132 136       Blood Sugar Average: Last 72 hrs:  (P) 142.2   · Patient is on linagliptin at home  · Continue Humalog sliding scale with Accu-Cheks before every meal and at bedtime  · diabetic diet  · lantus 15 hs and lispro 5 units tid with meals    ESRD (end stage renal disease) [facility])  Assessment & Plan  Lab Results   Component Value Date    EGFR 6 08/29/2023    EGFR 9 08/27/2023    EGFR 7 08/26/2023    CREATININE 7.97 (H) 08/29/2023    CREATININE 5.84 (H) 08/27/2023    CREATININE 6.71 (H) 08/26/2023   · Patient is on Tuesday Thursday Saturday schedule at [facility]  · IJ  permcath removed, temporary  catheter placed 8/25  · Nephrology following  · Will need permcath placed when cleared by ID VTE Pharmacologic Prophylaxis: VTE Score: 2 Moderate Risk (Score 3-4) - Pharmacological DVT Prophylaxis Ordered: heparin. Patient Centered Rounds: I performed bedside rounds with nursing staff today. Discussions with Specialists or Other Care Team Provider: ID, rn, cm    Education and Discussions with Family / Patient: Patient declined call to . Total Time Spent on Date of Encounter in care of patient: 35 minutes This time was spent on one or more of the following: performing physical exam; counseling and coordination of care; obtaining or reviewing history; documenting in the medical record; reviewing/ordering tests, medications or procedures; communicating with other healthcare professionals and discussing with patient's family/caregivers. Current Length of Stay: 10 day(s)  Current Patient Status: Inpatient   Certification Statement: The patient will continue to require additional inpatient hospital stay due to angiogram, repeat arterial dopplers, podiatry intervention, permcath placement  Discharge Plan: Anticipate discharge in 48-72 hrs to discharge location to be determined pending rehab evaluations. Code Status: Level 1 - Full Code    Subjective:   Patient seen and examined at bedside prior to angiogram this afternoon. He is pleasant and cooperative. Denies any complaints including fever, chills, headache, chest pain, shortness of breath, nausea, vomiting, abdominal pain, or foot pain. Objective:     Vitals:   Temp (24hrs), Av °F (36.1 °C), Min:95.9 °F (35.5 °C), Max:97.8 °F (36.6 °C)    Temp:  [95.9 °F (35.5 °C)-97.8 °F (36.6 °C)] 97.4 °F (36.3 °C)  HR:  [55-69] 59  Resp:  [12-20] 18  BP: (113-166)/(56-76) 146/62  SpO2:  [95 %-100 %] 97 %  Body mass index is 33.38 kg/m². Input and Output Summary (last 24 hours):      Intake/Output Summary (Last 24 hours) at 2023 1652  Last data filed at 2023 1230  Gross per 24 hour   Intake 740 ml   Output 3280 ml   Net -2540        Physical Exam:   Physical Exam  Vitals and nursing note reviewed. Constitutional:       General: He is not in acute distress. Appearance: He is well-developed. Cardiovascular:      Rate and Rhythm: Normal rate and regular rhythm. Pulmonary:      Effort: Pulmonary effort is normal.      Breath sounds: Normal breath sounds. Abdominal:      Palpations: Abdomen is soft. Tenderness: There is no abdominal tenderness. Musculoskeletal:         General: No swelling. Skin:     General: Skin is warm and dry. Capillary Refill: Capillary refill takes less than 2 seconds. Comments: R foot dressing with minimal drainage, surrounding erythema and chronic venous stasis changes   Neurological:      Mental Status: He is alert.    Psychiatric:         Mood and Affect: Mood normal.         Additional Data:     Labs:  Results from last 7 days   Lab Units 08/29/23  0501   WBC Thousand/uL 14.91*   HEMOGLOBIN g/dL 11.5*   HEMATOCRIT % 36.8   PLATELETS Thousands/uL 386     Results from last 7 days   Lab Units 08/29/23  0915 08/29/23  0501 08/27/23  0455   SODIUM mmol/L  --  130* 131*   POTASSIUM mmol/L 4.0 6.2* 3.8   CHLORIDE mmol/L  --  95* 95*   CO2 mmol/L  --  22 27   BUN mg/dL  --  57* 36*   CREATININE mg/dL  --  7.97* 5.84*   ANION GAP mmol/L  --  13 9   CALCIUM mg/dL  --  8.9 7.8*   ALBUMIN g/dL  --   --  3.1*   GLUCOSE RANDOM mg/dL  --  120 140         Results from last 7 days   Lab Units 08/29/23  1609 08/29/23  1056 08/29/23  0713 08/28/23 2010 08/28/23  1545 08/28/23  1129 08/28/23  0718 08/27/23  2044 08/27/23  1624 08/27/23  1107 08/27/23  0730 08/26/23  2106   POC GLUCOSE mg/dl 136 132 130 161* 135 138 178* 199* 141* 138 146* 159*               Lines/Drains:  Invasive Devices     Peripheral Intravenous Line  Duration           Peripheral IV 08/27/23 Right Antecubital 1 day    Peripheral IV 08/29/23 Left;Ventral (anterior) Forearm <1 day          Hemodialysis Catheter  Duration           HD Temporary Double Catheter 4 days                      Imaging: No pertinent imaging reviewed. Recent Cultures (last 7 days):   Results from last 7 days   Lab Units 08/24/23  1748 08/24/23  0618   BLOOD CULTURE   --  No Growth After 5 Days. No Growth After 5 Days.    GRAM STAIN RESULT  Rare Polys  No bacteria seen  1+ Polys*  1+ Gram positive cocci in pairs*  --    WOUND CULTURE  1+ Growth of Proteus vulgaris group*  1+ Growth of Enterobacter cloacae*  1+ Growth of Beta Hemolytic Streptococcus Not Group A, B, C, F or G*  --        Last 24 Hours Medication List:   Current Facility-Administered Medications   Medication Dose Route Frequency Provider Last Rate   • acetaminophen  650 mg Oral Q6H PRN Manish Romero MD     • acetaminophen  975 mg Oral Q8H Joel Dalton MD     • atorvastatin  80 mg Oral Daily Manish Romero MD     • carvedilol  12.5 mg Oral BID Manish Romero MD     • cefepime  1,000 mg Intravenous Q24H Jaren Méndez DO 1,000 mg (08/28/23 2030)   • famotidine  10 mg Oral Daily Manish Romero MD     • gabapentin  200 mg Oral BID Jaren Méndez, DO     • haloperidol lactate  2 mg Intramuscular Q6H PRN Jaren Méndez DO     • heparin (porcine)  5,000 Units Subcutaneous Q8H [address] Manish Romero MD     • HYDROmorphone  1 mg Intravenous Q4H PRN Joel Dalton MD     • insulin glargine  15 Units Subcutaneous HS Jaren Méndez DO     • insulin lispro  1-5 Units Subcutaneous HS Joel Dalton MD     • insulin lispro  2-12 Units Subcutaneous TID AC Joel Dalton MD     • insulin lispro  5 Units Subcutaneous TID With Meals Jaren Méndez, DO     • lidocaine  1 patch Topical Daily Joel Dalton MD     • LORazepam  0.5 mg Intravenous Q6H PRN Joel Dalton MD     • methocarbamol  500 mg Oral Q12H PRN Joel Dalton MD     • metroNIDAZOLE  500 mg Oral Q8H [address] Jaren Méndez DO     • NIFEdipine  30 mg Oral BID Joel Dalton MD     • ondansetron  4 mg Intravenous Q6H PRN Jose Parson MD     • oxyCODONE  5 mg Oral Q8H PRN Jose Parson MD     • senna-docusate sodium  2 tablet Oral BID Jaren Méndez DO     • sevelamer  800 mg Oral TID With Meals Jose Parson MD     • vancomycin  2,000 mg Intravenous Once Kash Chester PA-C 2,000 mg (08/29/23 1620)   • [START ON 8/31/2023] vancomycin  10 mg/kg (Adjusted) Intravenous Once per day on Tue Thu Sat Kash Chester PA-C          Today, Patient Was Seen By: Leeann Jain PA-C    **Please Note: This note may have been constructed using a voice recognition system. **

## 2023-08-30 ENCOUNTER — APPOINTMENT (OUTPATIENT)
Dept: NON INVASIVE DIAGNOSTICS | Facility: HOSPITAL | Age: 63
DRG: 474 | End: 2023-08-30
Attending: RADIOLOGY
Payer: MEDICARE

## 2023-08-30 LAB
ANION GAP SERPL CALCULATED.3IONS-SCNC: 9 MMOL/L
BUN SERPL-MCNC: 38 MG/DL (ref 5–25)
CALCIUM SERPL-MCNC: 8.2 MG/DL (ref 8.4–10.2)
CHLORIDE SERPL-SCNC: 100 MMOL/L (ref 96–108)
CO2 SERPL-SCNC: 25 MMOL/L (ref 21–32)
CREAT SERPL-MCNC: 6.49 MG/DL (ref 0.6–1.3)
ERYTHROCYTE [DISTWIDTH] IN BLOOD BY AUTOMATED COUNT: 17.2 % (ref 11.6–15.1)
GFR SERPL CREATININE-BSD FRML MDRD: 8 ML/MIN/1.73SQ M
GLUCOSE SERPL-MCNC: 112 MG/DL (ref 65–140)
GLUCOSE SERPL-MCNC: 118 MG/DL (ref 65–140)
GLUCOSE SERPL-MCNC: 132 MG/DL (ref 65–140)
GLUCOSE SERPL-MCNC: 195 MG/DL (ref 65–140)
GLUCOSE SERPL-MCNC: 94 MG/DL (ref 65–140)
HCT VFR BLD AUTO: 30 % (ref 36.5–49.3)
HGB BLD-MCNC: 9.3 G/DL (ref 12–17)
MCH RBC QN AUTO: 28.8 PG (ref 26.8–34.3)
MCHC RBC AUTO-ENTMCNC: 31 G/DL (ref 31.4–37.4)
MCV RBC AUTO: 93 FL (ref 82–98)
PLATELET # BLD AUTO: 302 THOUSANDS/UL (ref 149–390)
PMV BLD AUTO: 9.5 FL (ref 8.9–12.7)
POTASSIUM SERPL-SCNC: 4.2 MMOL/L (ref 3.5–5.3)
RBC # BLD AUTO: 3.23 MILLION/UL (ref 3.88–5.62)
SODIUM SERPL-SCNC: 134 MMOL/L (ref 135–147)
WBC # BLD AUTO: 14.7 THOUSAND/UL (ref 4.31–10.16)

## 2023-08-30 PROCEDURE — 80048 BASIC METABOLIC PNL TOTAL CA: CPT

## 2023-08-30 PROCEDURE — 36558 INSERT TUNNELED CV CATH: CPT

## 2023-08-30 PROCEDURE — 02H633Z INSERTION OF INFUSION DEVICE INTO RIGHT ATRIUM, PERCUTANEOUS APPROACH: ICD-10-PCS | Performed by: RADIOLOGY

## 2023-08-30 PROCEDURE — 99232 SBSQ HOSP IP/OBS MODERATE 35: CPT | Performed by: NURSE PRACTITIONER

## 2023-08-30 PROCEDURE — 99152 MOD SED SAME PHYS/QHP 5/>YRS: CPT

## 2023-08-30 PROCEDURE — 0JH63XZ INSERTION OF TUNNELED VASCULAR ACCESS DEVICE INTO CHEST SUBCUTANEOUS TISSUE AND FASCIA, PERCUTANEOUS APPROACH: ICD-10-PCS | Performed by: RADIOLOGY

## 2023-08-30 PROCEDURE — 99232 SBSQ HOSP IP/OBS MODERATE 35: CPT | Performed by: INTERNAL MEDICINE

## 2023-08-30 PROCEDURE — 99152 MOD SED SAME PHYS/QHP 5/>YRS: CPT | Performed by: RADIOLOGY

## 2023-08-30 PROCEDURE — C1750 CATH, HEMODIALYSIS,LONG-TERM: HCPCS

## 2023-08-30 PROCEDURE — 85027 COMPLETE CBC AUTOMATED: CPT

## 2023-08-30 PROCEDURE — 77001 FLUOROGUIDE FOR VEIN DEVICE: CPT | Performed by: RADIOLOGY

## 2023-08-30 PROCEDURE — 97110 THERAPEUTIC EXERCISES: CPT

## 2023-08-30 PROCEDURE — 77001 FLUOROGUIDE FOR VEIN DEVICE: CPT

## 2023-08-30 PROCEDURE — 82948 REAGENT STRIP/BLOOD GLUCOSE: CPT

## 2023-08-30 PROCEDURE — C1769 GUIDE WIRE: HCPCS

## 2023-08-30 PROCEDURE — 97530 THERAPEUTIC ACTIVITIES: CPT

## 2023-08-30 PROCEDURE — 02PYX3Z REMOVAL OF INFUSION DEVICE FROM GREAT VESSEL, EXTERNAL APPROACH: ICD-10-PCS | Performed by: RADIOLOGY

## 2023-08-30 PROCEDURE — 36558 INSERT TUNNELED CV CATH: CPT | Performed by: RADIOLOGY

## 2023-08-30 RX ORDER — FENTANYL CITRATE 50 UG/ML
INJECTION, SOLUTION INTRAMUSCULAR; INTRAVENOUS AS NEEDED
Status: COMPLETED | OUTPATIENT
Start: 2023-08-30 | End: 2023-08-30

## 2023-08-30 RX ORDER — MIDAZOLAM HYDROCHLORIDE 2 MG/2ML
INJECTION, SOLUTION INTRAMUSCULAR; INTRAVENOUS AS NEEDED
Status: COMPLETED | OUTPATIENT
Start: 2023-08-30 | End: 2023-08-30

## 2023-08-30 RX ADMIN — MIDAZOLAM 1 MG: 1 INJECTION INTRAMUSCULAR; INTRAVENOUS at 10:00

## 2023-08-30 RX ADMIN — METRONIDAZOLE 500 MG: 500 TABLET ORAL at 18:01

## 2023-08-30 RX ADMIN — CARVEDILOL 12.5 MG: 12.5 TABLET, FILM COATED ORAL at 09:03

## 2023-08-30 RX ADMIN — FAMOTIDINE 10 MG: 20 TABLET, FILM COATED ORAL at 09:03

## 2023-08-30 RX ADMIN — ATORVASTATIN CALCIUM 80 MG: 80 TABLET, FILM COATED ORAL at 09:03

## 2023-08-30 RX ADMIN — INSULIN LISPRO 1 UNITS: 100 INJECTION, SOLUTION INTRAVENOUS; SUBCUTANEOUS at 21:54

## 2023-08-30 RX ADMIN — GABAPENTIN 200 MG: 250 SUSPENSION ORAL at 17:02

## 2023-08-30 RX ADMIN — INSULIN LISPRO 5 UNITS: 100 INJECTION, SOLUTION INTRAVENOUS; SUBCUTANEOUS at 12:01

## 2023-08-30 RX ADMIN — SEVELAMER HYDROCHLORIDE 800 MG: 800 TABLET ORAL at 17:03

## 2023-08-30 RX ADMIN — HEPARIN SODIUM 5000 UNITS: 5000 INJECTION INTRAVENOUS; SUBCUTANEOUS at 21:56

## 2023-08-30 RX ADMIN — CARVEDILOL 12.5 MG: 12.5 TABLET, FILM COATED ORAL at 17:05

## 2023-08-30 RX ADMIN — INSULIN LISPRO 5 UNITS: 100 INJECTION, SOLUTION INTRAVENOUS; SUBCUTANEOUS at 17:03

## 2023-08-30 RX ADMIN — CEFEPIME HYDROCHLORIDE 1000 MG: 1 INJECTION, SOLUTION INTRAVENOUS at 19:52

## 2023-08-30 RX ADMIN — METRONIDAZOLE 500 MG: 500 TABLET ORAL at 01:35

## 2023-08-30 RX ADMIN — FENTANYL CITRATE 50 MCG: 50 INJECTION, SOLUTION INTRAMUSCULAR; INTRAVENOUS at 10:01

## 2023-08-30 RX ADMIN — NIFEDIPINE 30 MG: 30 TABLET, EXTENDED RELEASE ORAL at 09:03

## 2023-08-30 RX ADMIN — SENNOSIDES AND DOCUSATE SODIUM 2 TABLET: 50; 8.6 TABLET ORAL at 17:05

## 2023-08-30 RX ADMIN — NIFEDIPINE 30 MG: 30 TABLET, EXTENDED RELEASE ORAL at 17:05

## 2023-08-30 RX ADMIN — Medication 20 ML: at 10:06

## 2023-08-30 RX ADMIN — HEPARIN SODIUM 5000 UNITS: 5000 INJECTION INTRAVENOUS; SUBCUTANEOUS at 14:31

## 2023-08-30 RX ADMIN — FENTANYL CITRATE 50 MCG: 50 INJECTION, SOLUTION INTRAMUSCULAR; INTRAVENOUS at 10:09

## 2023-08-30 RX ADMIN — MIDAZOLAM 1 MG: 1 INJECTION INTRAMUSCULAR; INTRAVENOUS at 10:08

## 2023-08-30 RX ADMIN — INSULIN GLARGINE 15 UNITS: 100 INJECTION, SOLUTION SUBCUTANEOUS at 21:55

## 2023-08-30 RX ADMIN — SEVELAMER HYDROCHLORIDE 800 MG: 800 TABLET ORAL at 12:01

## 2023-08-30 RX ADMIN — METRONIDAZOLE 500 MG: 500 TABLET ORAL at 10:01

## 2023-08-30 NOTE — ASSESSMENT & PLAN NOTE
Polymicrobial bacteremia.  Enterobacter/Proteus/Streptococcus/Granulicatella  · Suspect cause of patient's initial metabolic encephalopathy which has improved with abx treatment  · ID following  · continue cefepime and flagyl  · Started on vancomycin 8/29  · Afebrile  · Monitor fever curve and cbc

## 2023-08-30 NOTE — PHYSICAL THERAPY NOTE
PT TREATMENT       08/30/23 1518   PT Last Visit   PT Visit Date 08/30/23   Note Type   Note Type Treatment   Pain Assessment   Pain Assessment Tool 0-10   Pain Score No Pain   Patient's Stated Pain Goal No pain   Hospital Pain Intervention(s) Repositioned; Ambulation/increased activity   Multiple Pain Sites No   Restrictions/Precautions   Weight Bearing Precautions Per Order Yes   RLE Weight Bearing Per Order Other  (Heel WB to R foot with surgical shoe donned)   Braces or Orthoses Other (Comment)  (surgical shoe)   Other Precautions Fall Risk;Bed Alarm; Chair Alarm   General   Chart Reviewed Yes   Family/Caregiver Present No   Cognition   Overall Cognitive Status WFL   Arousal/Participation Cooperative   Orientation Level Oriented X4   Following Commands Follows multistep commands with increased time or repetition   Subjective   Subjective "I feel so tired right now"   Bed Mobility   Supine to Sit 5  Supervision   Additional items Assist x 1;Verbal cues; Bedrails   Sit to Supine 5  Supervision   Additional items Verbal cues; Bedrails   Transfers   Sit to Stand 5  Supervision  (briefly requires contact guard to stand but quickly able to progress to perform with supervision)   Additional items Assist x 1;Verbal cues; Increased time required   Stand to Sit 5  Supervision   Additional items Assist x 1;Verbal cues   Ambulation/Elevation   Gait pattern Foward flexed; Short stride; Antalgic; Step to   Gait Assistance 5  Supervision  (CG to close supervision)   Additional items Assist x 1;Verbal cues   Assistive Device Rolling walker   Distance 50 feet with change of direction   Stair Management Assistance Not tested   Balance   Static Sitting Good   Dynamic Sitting Good   Static Standing Fair   Dynamic Standing [address] [facility]  (RW)   Activity Tolerance   Activity Tolerance Patient tolerated treatment well;Patient limited by fatigue   Nurse Made Aware yes CURLY English   Neuro re-ed Pt able to perform supine/seated exercise including ankle pumps, LAQ, seated hip marches, hip add squeezes, SLR flex/abd x 10-15 reps bilat   Assessment   Prognosis Good   Problem List Decreased strength;Decreased endurance; Impaired balance;Decreased mobility; Decreased safety awareness;Decreased skin integrity   Assessment Pt agreeable to PT sessiont his afternoon. Pt reports increased fatigue today s/p permacath placement but continues to present with improved tolerance to functional mobility. Requires increased time today but able to ambulate x 50 feet with close supervision/CG using RW. Able to perform exercise as mentioned above with good response, no complaints. The patient's AM-PAC Basic Mobility Inpatient Short Form Raw Score is 18. A Raw score of greater than 16 suggests the patient may benefit from discharge to home. Please also refer to the recommendation of the Physical Therapist for safe discharge planning. Goals   Patient Goals to get better   Plan   Treatment/Interventions ADL retraining;Functional transfer training;LE strengthening/ROM; Therapeutic exercise; Endurance training;Bed mobility;Gait training;Spoke to nursing   PT Frequency Other (Comment)  (5x/week)   Recommendation   PT Discharge Recommendation Home with home health rehabilitation  (+ family assist)   AM-PAC Basic Mobility Inpatient   Turning in Flat Bed Without Bedrails 4   Lying on Back to Sitting on Edge of Flat Bed Without Bedrails 3   Moving Bed to Chair 3   Standing Up From Chair Using Arms 3   Walk in Room 3   Climb 3-5 Stairs With Railing 2   Basic Mobility Inpatient Raw Score 18   Basic Mobility Standardized Score 41.05   Highest Level Of Mobility   JH-HLM Goal 6: Walk 10 steps or more   JH-HLM Achieved 7: Walk 25 feet or more   End of Consult   Patient Position at End of Consult Supine; All needs within reach   [facility] Insurance Number  Lidia Elias [id number]

## 2023-08-30 NOTE — PLAN OF CARE
Problem: PHYSICAL THERAPY ADULT  Goal: Performs mobility at highest level of function for planned discharge setting. See evaluation for individualized goals. Description: Treatment/Interventions: ADL retraining, Functional transfer training, LE strengthening/ROM, Therapeutic exercise, Endurance training, Bed mobility, Gait training, Spoke to nursing          See flowsheet documentation for full assessment, interventions and recommendations. Outcome: Progressing  Note: Prognosis: Good  Problem List: Decreased strength, Decreased endurance, Impaired balance, Decreased mobility, Decreased safety awareness, Decreased skin integrity  Assessment: Pt agreeable to PT sessiont his afternoon. Pt reports increased fatigue today s/p permacath placement but continues to present with improved tolerance to functional mobility. Requires increased time today but able to ambulate x 50 feet with close supervision/CG using RW. Able to perform exercise as mentioned above with good response, no complaints. PT Discharge Recommendation: Home with home health rehabilitation (+ family assist)    See flowsheet documentation for full assessment.

## 2023-08-30 NOTE — SEDATION DOCUMENTATION
Right IJ perm placed, pt tolerated with sedation, dressing clean dry and intact. Vss, pt back to room in stable condition.

## 2023-08-30 NOTE — BRIEF OP NOTE (RAD/CATH)
INTERVENTIONAL RADIOLOGY PROCEDURE NOTE    Date: 8/30/2023    Procedure: IR TUNNELED DIALYSIS CATHETER PLACEMENT     Preoperative diagnosis:   1. ESRD (end stage renal disease) (HCC)    2. Elevated CK    3. Hypomagnesemia    4. Hypophosphatemia    5. Altered mental status    6. AMS (altered mental status)    7. Open wound of plantar aspect of right foot    8. Sepsis ([address])    9. Bacteremia    10. PAD (peripheral artery disease) ([address])    11. 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 mellitus, with bone involvement without evidence of necrosis ([address])    12. Cellulitis of right foot    13. Polymicrobial bacterial infection    14. Subacute osteomyelitis of right foot (HCC)         Postoperative diagnosis: Same. Surgeon: Pedrito Valiente MD     Assistant: None. No qualified resident was available. Blood loss: minimal    Specimens: none    Findings:   TDC placement. Complications: None immediate.     Anesthesia: conscious sedation

## 2023-08-30 NOTE — PROGRESS NOTES
Georgia Harman is a 58 y.o. male who is currently ordered Vancomycin IV with management by the Pharmacy Consult service. Relevant clinical data and objective / subjective history reviewed. Vancomycin Assessment:  Indication and Goal AUC/Trough: Bone/joint infection (goal -600, trough >10), -600, trough >10  Clinical Status: stable  Micro: MRSA+  Renal Function:  SCr: 6.49 mg/dL  CrCl: 15.3 mL/min  Renal replacement: HD  Days of Therapy: 2  Current Dose: 1000 mg IV 3 times weekly post HD on Tuesday, Thursday, Saturday  Vancomycin Plan:  Continue the current plan. No dose is scheduled today. Estimated AUC: 400-600 mcg*hr/mL  Estimated Trough: >10 mcg/mL  Next Level: 9/2/23 @0600  Renal Function Monitoring: Daily BMP and [place] will continue to follow closely for s/sx of nephrotoxicity, infusion reactions and appropriateness of therapy. BMP and CBC will be ordered per protocol. We will continue to follow the patient’s culture results and clinical progress daily.     Destin Bennett Pharmacist

## 2023-08-30 NOTE — PROGRESS NOTES
Progress Note - Infectious Disease   John Varma 58 y.o. male MRN: [id number]  Unit/Bed#: 913 [facility] 421-01 Encounter: [id number]      Impression/Plan:  1. Polymicrobic bacteremia. In setting of #3, admission blood cultures 1 of 2 sets grew Granulicatella adiacens, Enterobacter cloacae, Proteus vulgaris and Bacteroides fragilis. Patient with a permacath since 1/27/22 as well as an active right foot diabetic ulcer s/p debridement as below. Permacath removed, 8/24/23 cath tip 50 col staph epi, likely contaminant. 8/24/23 TTE no vegetation seen  -continues Cefepime/Flagyl at present  -continues Vancomycin, dosed by Pharmacy  -follow up Granulicatella sensitivities as possible; microbiologist isolated 8/29/23  -management as below for #3     2. Encephalopathy. 8/22/23 MRI Brain: limited to motion. No evident acute infarct  Increased altered mental status following HD session 8/23/23. improved  -infectious work up/management as above  -monitor mental status  -neurology on board     3. Right diabetic foot ulcer with cellulitis and concern for septic joint and osteomyelitis on MRI.  8/21/23 Wound cx: 1+ pseudomonas aeruginosa pansensitive, 2+ Enterobacter cloacae, 2+ MSF. 8/23/23 MRI right foot: 1st submetatarsal head plantar ulcer with suspected sinus tract to 1st MTP joint concerning for septic arthropathy and marrow changes of tibia/fibia/sesamoid bones c/w osteomyelitis. Right foot s/p debridement 8/24/23 with tissue cxs with predominant proteus and Enterobacter growth and 1 colony enterococcus faecalis  8/29/23 RLE angiogram with mild atherosclerotic disease, no intervention required  -continue antibiotics as above  -local wound care  -podiatry on boad  -vascular on board  -follow up podiatric plan    4. Leukocytosis, POA. WBC 14 K. In setting of #1/3.   -continue antibiotics as above     5.  ESRD on HD via right chest permacath placed 1/2023.   -renal dose adjust antibiotic as needed  -volume/HD management per nephrology  -can change temporary to permacath this week.     6. Type 2 Diabetes Mellitus. HgbA1C 6.6  -monitor symptoms  -symptomatic management per primary care team  -advancing diet as tolerated     Antibiotics:  Vancomycin D2/Cefepime/Flagyl D9     I have discussed the above management plan in detail with patient, microbiologist, RN, and Dr. Neal Churchill of the primary service. Subjective:  Patient has no fever, chills, sweats reported overnight; no nausea, vomiting, diarrhea; no cough, shortness of breath; no foot pain. Objective:  Vitals:  Temp:  [97.3 °F (36.3 °C)-98 °F (36.7 °C)] 97.9 °F (36.6 °C)  HR:  [59-72] 60  Resp:  [13-20] 19  BP: (114-169)/(51-74) 125/65  SpO2:  [95 %-100 %] 97 %  Temp (24hrs), Av.7 °F (36.5 °C), Min:97.3 °F (36.3 °C), Max:98 °F (36.7 °C)  Current: Temperature: 97.9 °F (36.6 °C)    Physical Exam:   General Appearance:  58year old male, chronically debilitated, nontoxic appearance today, alert and oriented, no acute distress, sitting at bedside   HEENT: Atraumatic normocephalic   Throat: Oropharynx moist. Poor dentition   Pulmonary:   Normal respiratory excursion without accessory muscle use   Cardiac:  RRR   Abdomen:   Soft, non-tender, non-distended, protuberant   Extremities: Venous stained LEs, multiple scabbed abrasions of knees and shins. Right foot gauze/ace wrap intact with scant blood-tinged plantar staining strike through. : No gaitan, no SPT   Psychiatric: Awake, cooperative   Skin: No new rashes. IV site nontender.  Right chest new permacath site nontender       Labs, Imaging, & Other studies:   All pertinent labs and imaging studies were personally reviewed  Results from last 7 days   Lab Units 23  0553 23  0501 23  0455   WBC Thousand/uL 14.70* 14.91* 10.02   HEMOGLOBIN g/dL 9.3* 11.5* 9.9*   PLATELETS Thousands/uL 302 386 290     Results from last 7 days   Lab Units 23  0553 23  0915 23  0501 23  0455   SODIUM mmol/L 134*  --  130* 131*   POTASSIUM mmol/L 4.2   < > 6.2* 3.8   CHLORIDE mmol/L 100  --  95* 95*   CO2 mmol/L 25  --  22 27   BUN mg/dL 38*  --  57* 36*   CREATININE mg/dL 6.49*  --  7.97* 5.84*   EGFR ml/min/1.73sq m 8  --  6 9   CALCIUM mg/dL 8.2*  --  8.9 7.8*    < > = values in this interval not displayed. Results from last 7 days   Lab Units 08/24/23  1748 08/24/23  0629 08/24/23  0618   BLOOD CULTURE   --   --  No Growth After 5 Days. No Growth After 5 Days.    GRAM STAIN RESULT  Rare Polys  No bacteria seen  1+ Polys*  1+ Gram positive cocci in pairs*  --   --    WOUND CULTURE  1+ Growth of Proteus vulgaris group*  1+ Growth of Enterobacter cloacae*  1+ Growth of Beta Hemolytic Streptococcus Not Group A, B, C, F or G*  --   --    MRSA CULTURE ONLY   --  No Methicillin Resistant Staphlyococcus aureus (MRSA) isolated  --

## 2023-08-30 NOTE — ASSESSMENT & PLAN NOTE
Lab Results   Component Value Date    HGBA1C 6.6 (H) 07/07/2022       Recent Labs     08/29/23  1609 08/29/23  2040 08/30/23  0743 08/30/23  1111   POCGLU 136 141* 112 118       Blood Sugar Average: Last 72 hrs:  (P) 029.5648829454467952     Podiatry consulted for [facility] grade 1 ulcer submetatarsal 1 left foot with cellulitis and concern for septic joint and osteomyelitis on MRI  · S/P Debridement 8/21 and 8/24  · Noted to have elevated CRP, procalcitonin  · Wound culture 8/21 growing 1 + pseudomonas aeruginosa, 2 + enterobacter cloacae  · Wound culture 8/24 growing proteus, enterobacter, enterococcus  · MRI R foot: First submetatarsal head plantar skin ulceration with suspected draining sinus tract extending to the first metatarsal phalangeal joint suspicious for septic arthropathy  · Vascular consulted as imaging showing lower limb diffuse atherosclerotic disease noted throughout with elevated PSV of proximal popliteal artery and tibial peroneal trunk suggesting less than 50% stenosis, metatarsal pressure greater than 200, great toe pressure 95 mmHg within the healing range on right side. Left lower limb showed diffuse atherosclerotic disease noted throughout with a 50 to 75% stenosis of the proximal popliteal artery, metatarsal pressure 170, great toe pressure 98 mmHg within healing range.   · angiogram by IR  8/29/23  · Spoke with podiatry, requesting arterial duplex on right side post angiogram, follow-up on results  · Follow-up with podiatry regarding next steps

## 2023-08-30 NOTE — ASSESSMENT & PLAN NOTE
Lab Results   Component Value Date    EGFR 8 08/30/2023    EGFR 6 08/29/2023    EGFR 9 08/27/2023    CREATININE 6.49 (H) 08/30/2023    CREATININE 7.97 (H) 08/29/2023    CREATININE 5.84 (H) 08/27/2023   · Patient is on Tuesday Thursday Saturday schedule at [address]  · IJ  permcath removed, temporary  catheter placed 8/25  · Nephrology following  · Patient cleared by ID for permacath placement which he underwent on 8/30.   · Anticipate hemodialysis on 8/31

## 2023-08-30 NOTE — TREATMENT PLAN
Nephrology    Patient is currently not in his room was not able to see him today. He underwent right lower extremity angiogram yesterday which was found to be normal except for mild distal popliteal atherosclerosis and mild focal narrowing of the origin of the anterior tibial artery. Otherwise widely patent three-vessel runoff to the foot. Patient is in what appears to be IR likely for changing of his temporary dialysis catheter to a permacath. I did speak to infectious disease yesterday who gave the okay for placement of the permacath. Overall once permacath is in place stable from renal standpoint for discharge. Plan for next hemodialysis tomorrow if he remains inpatient.

## 2023-08-30 NOTE — ASSESSMENT & PLAN NOTE
Lab Results   Component Value Date    HGBA1C 6.6 (H) 07/07/2022       Recent Labs     08/29/23  1609 08/29/23  2040 08/30/23  0743 08/30/23  1111   POCGLU 136 141* 112 118       Blood Sugar Average: Last 72 hrs:  (P) 142.8464144191710353   · Patient is on linagliptin at home  · Continue Humalog sliding scale with Accu-Cheks before every meal and at bedtime  · diabetic diet  · lantus 15 hs and lispro 5 units tid with meals

## 2023-08-30 NOTE — PROGRESS NOTES
[address] [facility]  Progress Note  Name: Aayush Madison  MRN: [id number]  Unit/Bed#: 913 [facility] 421-01 I Date of Admission: 8/19/2023   Date of Service: 8/30/2023 I Hospital Day: 11    Assessment/Plan   * Polymicrobial bacterial infection  Assessment & Plan  Polymicrobial bacteremia.  Enterobacter/Proteus/Streptococcus/Granulicatella  · Suspect cause of patient's initial metabolic encephalopathy which has improved with abx treatment  · ID following  · continue cefepime and flagyl  · Started on vancomycin 8/29  · Afebrile  · Monitor fever curve and cbc    Diabetic ulcer of left midfoot associated with type 2 diabetes mellitus, limited to breakdown of skin [facility])  Assessment & Plan  Lab Results   Component Value Date    HGBA1C 6.6 (H) 07/07/2022       Recent Labs     08/29/23  1609 08/29/23  2040 08/30/23  0743 08/30/23  1111   POCGLU 136 141* 112 118       Blood Sugar Average: Last 72 hrs:  (P) 864.4181567297693252     Podiatry consulted for [place] grade 1 ulcer submetatarsal 1 left foot with cellulitis and concern for septic joint and osteomyelitis on MRI  · S/P Debridement 8/21 and 8/24  · Noted to have elevated CRP, procalcitonin  · Wound culture 8/21 growing 1 + pseudomonas aeruginosa, 2 + enterobacter cloacae  · Wound culture 8/24 growing proteus, enterobacter, enterococcus  · MRI R foot: First submetatarsal head plantar skin ulceration with suspected draining sinus tract extending to the first metatarsal phalangeal joint suspicious for septic arthropathy  · Vascular consulted as imaging showing lower limb diffuse atherosclerotic disease noted throughout with elevated PSV of proximal popliteal artery and tibial peroneal trunk suggesting less than 50% stenosis, metatarsal pressure greater than 200, great toe pressure 95 mmHg within the healing range on right side.   Left lower limb showed diffuse atherosclerotic disease noted throughout with a 50 to 75% stenosis of the proximal popliteal artery, metatarsal pressure 170, great toe pressure 98 mmHg within healing range. · angiogram by IR  8/29/23  · Spoke with podiatry, requesting arterial duplex on right side post angiogram, follow-up on results  · Follow-up with podiatry regarding next steps    Status post fall  Assessment & Plan  Patient sustained a fall about 2 days prior to admission   · Etiology not clear-EKG was unremarkable. Blood sugar was 157. Questionable hypoglycemia versus accidental fall. Patient not exhibiting any signs of stroke. · Telemetry showed no evidence of arrhythmia  · Patient does not remember how he fell. Patient was found on the floor and could not get up. · PT/OT evaluation and treatment, recommending home with home health currently, however patient feels that he is deconditioned and is concerned about his status postoperatively. Continue to follow  · CAT scan of the brain, CT chest abdomen pelvis, cervical spine and lumbar reconstructions study was ordered in the ED but patient refused the studies  · Patient got CT of the head and neck after receiving Ativan which was unremarkable  · Fall precautions    ESRD (end stage renal disease) [facility])  Assessment & Plan  Lab Results   Component Value Date    EGFR 8 08/30/2023    EGFR 6 08/29/2023    EGFR 9 08/27/2023    CREATININE 6.49 (H) 08/30/2023    CREATININE 7.97 (H) 08/29/2023    CREATININE 5.84 (H) 08/27/2023   · Patient is on Tuesday Thursday Saturday schedule at [address]  · IJ  permcath removed, temporary  catheter placed 8/25  · Nephrology following  · Patient cleared by ID for permacath placement which he underwent on 8/30.   · Anticipate hemodialysis on 8/31    Type 2 diabetes mellitus, without long-term current use of insulin [facility])  Assessment & Plan  Lab Results   Component Value Date    HGBA1C 6.6 (H) 07/07/2022       Recent Labs     08/29/23  1609 08/29/23  2040 08/30/23  0743 08/30/23  1111   POCGLU 136 141* 112 118       Blood Sugar Average: Last 72 hrs:  (P) 669.0116730528584565   · Patient is on linagliptin at home  · Continue Humalog sliding scale with Accu-Cheks before every meal and at bedtime  · diabetic diet  · lantus 15 hs and lispro 5 units tid with meals    Hypertension  Assessment & Plan  · Continue Procardia 30 mg p.o. twice daily, Coreg 12.5 mg p.o. twice daily  · Vital signs as per unit routine               VTE Pharmacologic Prophylaxis: VTE Score: 2 Moderate Risk (Score 3-4) - Pharmacological DVT Prophylaxis Ordered: heparin. Patient Centered Rounds: I performed bedside rounds with nursing staff today. Discussions with Specialists or Other Care Team Provider: Multidisciplinary team    Education and Discussions with Family / Patient: Patient indicated that he would update his family. Total Time Spent on Date of Encounter in care of patient: 45 minutes This time was spent on one or more of the following: performing physical exam; counseling and coordination of care; obtaining or reviewing history; documenting in the medical record; reviewing/ordering tests, medications or procedures; communicating with other healthcare professionals and discussing with patient's family/caregivers. Current Length of Stay: 11 day(s)  Current Patient Status: Inpatient   Certification Statement: The patient will continue to require additional inpatient hospital stay due to Insertion of new tunneled catheter for dialysis, dialysis in a.m., IV antibiotics, podiatry consultation, follow-up on plans for possible surgery. Discharge Plan: Anticipate discharge in 48-72 hrs to discharge location to be determined pending rehab evaluations. Code Status: Level 1 - Full Code    Subjective:   Patient seen sitting up on edge of bed after having permacath placed. Reports he feels pretty good no pain. Hungry and no nausea or vomiting. Denies any fevers or chills.     Objective:     Vitals:   Temp (24hrs), Av.7 °F (36.5 °C), Min:97.3 °F (36.3 °C), Max:98 °F (36.7 °C)    Temp:  [97.3 °F (36.3 °C)-98 °F (36.7 °C)] 97.9 °F (36.6 °C)  HR:  [59-72] 59  Resp:  [12-20] 19  BP: (114-169)/(51-76) 137/64  SpO2:  [95 %-100 %] 97 %  Body mass index is 33.38 kg/m². Input and Output Summary (last 24 hours): Intake/Output Summary (Last 24 hours) at 8/30/2023 1310  Last data filed at 8/30/2023 0001  Gross per 24 hour   Intake --   Output 300 ml   Net -300 ml       Physical Exam:   Physical Exam  Vitals and nursing note reviewed. Constitutional:       Appearance: Normal appearance. HENT:      Head: Normocephalic. Nose: Nose normal.      Mouth/Throat:      Mouth: Mucous membranes are moist.   Eyes:      Extraocular Movements: Extraocular movements intact. Conjunctiva/sclera: Conjunctivae normal.      Pupils: Pupils are equal, round, and reactive to light. Cardiovascular:      Rate and Rhythm: Normal rate and regular rhythm. Pulses: Normal pulses. Heart sounds: Normal heart sounds. Pulmonary:      Effort: Pulmonary effort is normal.      Breath sounds: Normal breath sounds. Abdominal:      General: Bowel sounds are normal. There is no distension. Palpations: Abdomen is soft. Tenderness: There is no abdominal tenderness. Musculoskeletal:      Cervical back: Normal range of motion. Comments: Right foot with dressing; gait unsteady, reports required assistance getting to bathroom this am    Skin:     Capillary Refill: Capillary refill takes less than 2 seconds. Neurological:      General: No focal deficit present. Mental Status: He is alert and oriented to person, place, and time. Psychiatric:         Mood and Affect: Mood normal.         Behavior: Behavior normal.         Thought Content:  Thought content normal.         Judgment: Judgment normal.          Additional Data:     Labs:  Results from last 7 days   Lab Units 08/30/23  0553   WBC Thousand/uL 14.70*   HEMOGLOBIN g/dL 9.3*   HEMATOCRIT % 30.0*   PLATELETS Thousands/uL 302 Results from last 7 days   Lab Units 08/30/23  0553 08/29/23  0501 08/27/23  0455   SODIUM mmol/L 134*   < > 131*   POTASSIUM mmol/L 4.2   < > 3.8   CHLORIDE mmol/L 100   < > 95*   CO2 mmol/L 25   < > 27   BUN mg/dL 38*   < > 36*   CREATININE mg/dL 6.49*   < > 5.84*   ANION GAP mmol/L 9   < > 9   CALCIUM mg/dL 8.2*   < > 7.8*   ALBUMIN g/dL  --   --  3.1*   GLUCOSE RANDOM mg/dL 132   < > 140    < > = values in this interval not displayed. Results from last 7 days   Lab Units 08/30/23  1111 08/30/23  0743 08/29/23  2040 08/29/23  1609 08/29/23  1056 08/29/23  0713 08/28/23  2010 08/28/23  1545 08/28/23  1129 08/28/23  0718 08/27/23  2044 08/27/23  1624   POC GLUCOSE mg/dl 118 112 141* 136 132 130 161* 135 138 178* 199* 141*               Lines/Drains:  Invasive Devices     Peripheral Intravenous Line  Duration           Peripheral IV 08/29/23 Left;Ventral (anterior) Forearm <1 day          Hemodialysis Catheter  Duration           HD Permanent Double Catheter <1 day                      Imaging: Reviewed radiology reports from this admission including: Angiogram right lower extremity    Recent Cultures (last 7 days):   Results from last 7 days   Lab Units 08/24/23  1748 08/24/23  0618   BLOOD CULTURE   --  No Growth After 5 Days. No Growth After 5 Days.    GRAM STAIN RESULT  Rare Polys  No bacteria seen  1+ Polys*  1+ Gram positive cocci in pairs*  --    WOUND CULTURE  1+ Growth of Proteus vulgaris group*  1+ Growth of Enterobacter cloacae*  1+ Growth of Beta Hemolytic Streptococcus Not Group A, B, C, F or G*  --        Last 24 Hours Medication List:   Current Facility-Administered Medications   Medication Dose Route Frequency Provider Last Rate   • acetaminophen  650 mg Oral Q6H PRN Castillo Farris MD     • acetaminophen  975 mg Oral Q8H Joel Dalton MD     • atorvastatin  80 mg Oral Daily Castillo Farris MD     • carvedilol  12.5 mg Oral BID Castillo Farris MD     • cefepime  1,000 mg Intravenous Q24H Jarne Tomario, DO 1,000 mg (08/29/23 2040)   • famotidine  10 mg Oral Daily Trang Rodriguez MD     • gabapentin  200 mg Oral BID Jaren Tomario, DO     • haloperidol lactate  2 mg Intramuscular Q6H PRN Jaern Tomistye, DO     • heparin (porcine)  5,000 Units Subcutaneous Q8H [address] Trang Rodriguez MD     • HYDROmorphone  1 mg Intravenous Q4H PRN Joel Dalton MD     • insulin glargine  15 Units Subcutaneous HS Jaren Tomario, DO     • insulin lispro  1-5 Units Subcutaneous HS Joel Dalton MD     • insulin lispro  2-12 Units Subcutaneous TID AC Joel Dalton MD     • insulin lispro  5 Units Subcutaneous TID With Meals Jaren Tomario, DO     • lidocaine  1 patch Topical Daily Joel Dalton MD     • LORazepam  0.5 mg Intravenous Q6H PRN Joel Dalton MD     • methocarbamol  500 mg Oral Q12H PRN Joel Dalton MD     • metroNIDAZOLE  500 mg Oral Q8H [address] Jaren Méndez, DO     • NIFEdipine  30 mg Oral BID Joel Dalton MD     • ondansetron  4 mg Intravenous Q6H PRN Joel Dalton MD     • oxyCODONE  5 mg Oral Q8H PRN Joel Dalton MD     • senna-docusate sodium  2 tablet Oral BID Jaren Tomario, DO     • sevelamer  800 mg Oral TID With Meals Trang Rodriguez MD     • [START ON 8/31/2023] vancomycin  10 mg/kg (Adjusted) Intravenous Once per day on Tue Thu Hilario Shah PA-C          Today, Patient Was Seen By: GRANT Small    **Please Note: This note may have been constructed using a voice recognition system. **

## 2023-08-30 NOTE — PROGRESS NOTES
Deterioration Index Critical Care Recommendations  Room #:  4North  Deterioration index score: 53.58%    Critical Care recommends NA    Spoke with Qamar Wright, primary RN from primary team. Per RN, patient has returned from IR for permacath insertion and is at his baseline, eating a meal. She has no concerns about the patient. Staff aware they may call me with any questions, concerns or updates. Brief summary:   Critical care was brought to the patient's bedside via deterioration index alert. The alert was concerning for age (nonmodifiable), BUN 38, hct 30. Notably, DI score was elevated at the time he was in IR per RN. Please contact critical care via [employer] with any questions or concerns.

## 2023-08-30 NOTE — ASSESSMENT & PLAN NOTE
Patient sustained a fall about 2 days prior to admission   · Etiology not clear-EKG was unremarkable. Blood sugar was 157. Questionable hypoglycemia versus accidental fall. Patient not exhibiting any signs of stroke. · Telemetry showed no evidence of arrhythmia  · Patient does not remember how he fell. Patient was found on the floor and could not get up. · PT/OT evaluation and treatment, recommending home with home health currently, however patient feels that he is deconditioned and is concerned about his status postoperatively.   Continue to follow  · CAT scan of the brain, CT chest abdomen pelvis, cervical spine and lumbar reconstructions study was ordered in the ED but patient refused the studies  · Patient got CT of the head and neck after receiving Ativan which was unremarkable  · Fall precautions

## 2023-08-31 ENCOUNTER — APPOINTMENT (INPATIENT)
Dept: RADIOLOGY | Facility: HOSPITAL | Age: 63
DRG: 474 | End: 2023-08-31
Payer: MEDICARE

## 2023-08-31 ENCOUNTER — APPOINTMENT (INPATIENT)
Dept: DIALYSIS | Facility: HOSPITAL | Age: 63
DRG: 474 | End: 2023-08-31
Attending: STUDENT IN AN ORGANIZED HEALTH CARE EDUCATION/TRAINING PROGRAM
Payer: MEDICARE

## 2023-08-31 ENCOUNTER — ANESTHESIA EVENT (INPATIENT)
Dept: PERIOP | Facility: HOSPITAL | Age: 63
DRG: 474 | End: 2023-08-31
Payer: MEDICARE

## 2023-08-31 ENCOUNTER — ANESTHESIA (INPATIENT)
Dept: PERIOP | Facility: HOSPITAL | Age: 63
DRG: 474 | End: 2023-08-31
Payer: MEDICARE

## 2023-08-31 LAB
ANION GAP SERPL CALCULATED.3IONS-SCNC: 9 MMOL/L
BACTERIA TISS AEROBE CULT: ABNORMAL
BUN SERPL-MCNC: 47 MG/DL (ref 5–25)
CALCIUM SERPL-MCNC: 8.3 MG/DL (ref 8.4–10.2)
CHLORIDE SERPL-SCNC: 101 MMOL/L (ref 96–108)
CO2 SERPL-SCNC: 25 MMOL/L (ref 21–32)
CREAT SERPL-MCNC: 7.76 MG/DL (ref 0.6–1.3)
ERYTHROCYTE [DISTWIDTH] IN BLOOD BY AUTOMATED COUNT: 17.5 % (ref 11.6–15.1)
GFR SERPL CREATININE-BSD FRML MDRD: 6 ML/MIN/1.73SQ M
GLUCOSE SERPL-MCNC: 106 MG/DL (ref 65–140)
GLUCOSE SERPL-MCNC: 110 MG/DL (ref 65–140)
GLUCOSE SERPL-MCNC: 123 MG/DL (ref 65–140)
GLUCOSE SERPL-MCNC: 130 MG/DL (ref 65–140)
GLUCOSE SERPL-MCNC: 132 MG/DL (ref 65–140)
GLUCOSE SERPL-MCNC: 172 MG/DL (ref 65–140)
GRAM STN SPEC: ABNORMAL
GRAM STN SPEC: ABNORMAL
HCT VFR BLD AUTO: 31.1 % (ref 36.5–49.3)
HGB BLD-MCNC: 9.7 G/DL (ref 12–17)
MAGNESIUM SERPL-MCNC: 2.2 MG/DL (ref 1.9–2.7)
MCH RBC QN AUTO: 29.1 PG (ref 26.8–34.3)
MCHC RBC AUTO-ENTMCNC: 31.2 G/DL (ref 31.4–37.4)
MCV RBC AUTO: 93 FL (ref 82–98)
PLATELET # BLD AUTO: 302 THOUSANDS/UL (ref 149–390)
PMV BLD AUTO: 9.7 FL (ref 8.9–12.7)
POTASSIUM SERPL-SCNC: 4.5 MMOL/L (ref 3.5–5.3)
RBC # BLD AUTO: 3.33 MILLION/UL (ref 3.88–5.62)
SODIUM SERPL-SCNC: 135 MMOL/L (ref 135–147)
WBC # BLD AUTO: 13.2 THOUSAND/UL (ref 4.31–10.16)

## 2023-08-31 PROCEDURE — 87186 SC STD MICRODIL/AGAR DIL: CPT | Performed by: STUDENT IN AN ORGANIZED HEALTH CARE EDUCATION/TRAINING PROGRAM

## 2023-08-31 PROCEDURE — 83735 ASSAY OF MAGNESIUM: CPT | Performed by: NURSE PRACTITIONER

## 2023-08-31 PROCEDURE — 99232 SBSQ HOSP IP/OBS MODERATE 35: CPT | Performed by: INTERNAL MEDICINE

## 2023-08-31 PROCEDURE — 88305 TISSUE EXAM BY PATHOLOGIST: CPT | Performed by: PATHOLOGY

## 2023-08-31 PROCEDURE — 93926 LOWER EXTREMITY STUDY: CPT

## 2023-08-31 PROCEDURE — 87075 CULTR BACTERIA EXCEPT BLOOD: CPT | Performed by: STUDENT IN AN ORGANIZED HEALTH CARE EDUCATION/TRAINING PROGRAM

## 2023-08-31 PROCEDURE — 82948 REAGENT STRIP/BLOOD GLUCOSE: CPT

## 2023-08-31 PROCEDURE — 99232 SBSQ HOSP IP/OBS MODERATE 35: CPT | Performed by: NURSE PRACTITIONER

## 2023-08-31 PROCEDURE — 88311 DECALCIFY TISSUE: CPT | Performed by: PATHOLOGY

## 2023-08-31 PROCEDURE — 87205 SMEAR GRAM STAIN: CPT | Performed by: STUDENT IN AN ORGANIZED HEALTH CARE EDUCATION/TRAINING PROGRAM

## 2023-08-31 PROCEDURE — 85027 COMPLETE CBC AUTOMATED: CPT | Performed by: NURSE PRACTITIONER

## 2023-08-31 PROCEDURE — 0Y6M0Z9 DETACHMENT AT RIGHT FOOT, PARTIAL 1ST RAY, OPEN APPROACH: ICD-10-PCS | Performed by: STUDENT IN AN ORGANIZED HEALTH CARE EDUCATION/TRAINING PROGRAM

## 2023-08-31 PROCEDURE — 87077 CULTURE AEROBIC IDENTIFY: CPT | Performed by: STUDENT IN AN ORGANIZED HEALTH CARE EDUCATION/TRAINING PROGRAM

## 2023-08-31 PROCEDURE — 90935 HEMODIALYSIS ONE EVALUATION: CPT | Performed by: INTERNAL MEDICINE

## 2023-08-31 PROCEDURE — 80048 BASIC METABOLIC PNL TOTAL CA: CPT | Performed by: NURSE PRACTITIONER

## 2023-08-31 PROCEDURE — 87070 CULTURE OTHR SPECIMN AEROBIC: CPT | Performed by: STUDENT IN AN ORGANIZED HEALTH CARE EDUCATION/TRAINING PROGRAM

## 2023-08-31 RX ORDER — EPHEDRINE SULFATE 50 MG/ML
INJECTION INTRAVENOUS AS NEEDED
Status: DISCONTINUED | OUTPATIENT
Start: 2023-08-31 | End: 2023-08-31

## 2023-08-31 RX ORDER — LIDOCAINE HYDROCHLORIDE 10 MG/ML
INJECTION, SOLUTION EPIDURAL; INFILTRATION; INTRACAUDAL; PERINEURAL AS NEEDED
Status: DISCONTINUED | OUTPATIENT
Start: 2023-08-31 | End: 2023-08-31

## 2023-08-31 RX ORDER — FENTANYL CITRATE 50 UG/ML
INJECTION, SOLUTION INTRAMUSCULAR; INTRAVENOUS AS NEEDED
Status: DISCONTINUED | OUTPATIENT
Start: 2023-08-31 | End: 2023-08-31

## 2023-08-31 RX ORDER — MAGNESIUM HYDROXIDE 1200 MG/15ML
LIQUID ORAL AS NEEDED
Status: DISCONTINUED | OUTPATIENT
Start: 2023-08-31 | End: 2023-08-31 | Stop reason: HOSPADM

## 2023-08-31 RX ORDER — PROPOFOL 10 MG/ML
INJECTION, EMULSION INTRAVENOUS AS NEEDED
Status: DISCONTINUED | OUTPATIENT
Start: 2023-08-31 | End: 2023-08-31

## 2023-08-31 RX ORDER — SODIUM CHLORIDE 9 MG/ML
INJECTION, SOLUTION INTRAVENOUS CONTINUOUS PRN
Status: DISCONTINUED | OUTPATIENT
Start: 2023-08-31 | End: 2023-08-31

## 2023-08-31 RX ORDER — FENTANYL CITRATE/PF 50 MCG/ML
50 SYRINGE (ML) INJECTION
Status: DISCONTINUED | OUTPATIENT
Start: 2023-08-31 | End: 2023-08-31 | Stop reason: HOSPADM

## 2023-08-31 RX ORDER — SODIUM CHLORIDE, SODIUM LACTATE, POTASSIUM CHLORIDE, CALCIUM CHLORIDE 600; 310; 30; 20 MG/100ML; MG/100ML; MG/100ML; MG/100ML
20 INJECTION, SOLUTION INTRAVENOUS CONTINUOUS
Status: CANCELLED | OUTPATIENT
Start: 2023-08-31

## 2023-08-31 RX ORDER — VANCOMYCIN HYDROCHLORIDE 1 G/200ML
10 INJECTION, SOLUTION INTRAVENOUS 3 TIMES WEEKLY
Status: DISCONTINUED | OUTPATIENT
Start: 2023-08-31 | End: 2023-09-02

## 2023-08-31 RX ORDER — HYDROMORPHONE HCL/PF 1 MG/ML
0.5 SYRINGE (ML) INJECTION
Status: DISCONTINUED | OUTPATIENT
Start: 2023-08-31 | End: 2023-08-31 | Stop reason: HOSPADM

## 2023-08-31 RX ORDER — ONDANSETRON 2 MG/ML
INJECTION INTRAMUSCULAR; INTRAVENOUS AS NEEDED
Status: DISCONTINUED | OUTPATIENT
Start: 2023-08-31 | End: 2023-08-31

## 2023-08-31 RX ORDER — GLYCOPYRROLATE 0.2 MG/ML
INJECTION INTRAMUSCULAR; INTRAVENOUS AS NEEDED
Status: DISCONTINUED | OUTPATIENT
Start: 2023-08-31 | End: 2023-08-31

## 2023-08-31 RX ORDER — DEXAMETHASONE SODIUM PHOSPHATE 10 MG/ML
INJECTION, SOLUTION INTRAMUSCULAR; INTRAVENOUS AS NEEDED
Status: DISCONTINUED | OUTPATIENT
Start: 2023-08-31 | End: 2023-08-31

## 2023-08-31 RX ORDER — SODIUM CHLORIDE 9 MG/ML
INJECTION, SOLUTION INTRAVENOUS AS NEEDED
Status: DISCONTINUED | OUTPATIENT
Start: 2023-08-31 | End: 2023-08-31 | Stop reason: HOSPADM

## 2023-08-31 RX ORDER — ONDANSETRON 2 MG/ML
4 INJECTION INTRAMUSCULAR; INTRAVENOUS ONCE AS NEEDED
Status: DISCONTINUED | OUTPATIENT
Start: 2023-08-31 | End: 2023-08-31 | Stop reason: HOSPADM

## 2023-08-31 RX ORDER — ATORVASTATIN CALCIUM 80 MG/1
80 TABLET, FILM COATED ORAL DAILY
Status: DISCONTINUED | OUTPATIENT
Start: 2023-09-01 | End: 2023-09-06 | Stop reason: HOSPADM

## 2023-08-31 RX ORDER — MIDAZOLAM HYDROCHLORIDE 2 MG/2ML
INJECTION, SOLUTION INTRAMUSCULAR; INTRAVENOUS AS NEEDED
Status: DISCONTINUED | OUTPATIENT
Start: 2023-08-31 | End: 2023-08-31

## 2023-08-31 RX ORDER — CHLORHEXIDINE GLUCONATE 0.12 MG/ML
15 RINSE ORAL ONCE
Status: DISCONTINUED | OUTPATIENT
Start: 2023-08-31 | End: 2023-08-31 | Stop reason: HOSPADM

## 2023-08-31 RX ORDER — DIPHENHYDRAMINE HYDROCHLORIDE 50 MG/ML
12.5 INJECTION INTRAMUSCULAR; INTRAVENOUS ONCE AS NEEDED
Status: DISCONTINUED | OUTPATIENT
Start: 2023-08-31 | End: 2023-08-31 | Stop reason: HOSPADM

## 2023-08-31 RX ADMIN — EPHEDRINE SULFATE 10 MG: 50 INJECTION, SOLUTION INTRAVENOUS at 17:40

## 2023-08-31 RX ADMIN — SENNOSIDES AND DOCUSATE SODIUM 2 TABLET: 50; 8.6 TABLET ORAL at 21:03

## 2023-08-31 RX ADMIN — ACETAMINOPHEN 975 MG: 325 TABLET ORAL at 21:02

## 2023-08-31 RX ADMIN — NIFEDIPINE 30 MG: 30 TABLET, EXTENDED RELEASE ORAL at 21:02

## 2023-08-31 RX ADMIN — HEPARIN SODIUM 5000 UNITS: 5000 INJECTION INTRAVENOUS; SUBCUTANEOUS at 22:07

## 2023-08-31 RX ADMIN — ONDANSETRON 4 MG: 2 INJECTION INTRAMUSCULAR; INTRAVENOUS at 17:25

## 2023-08-31 RX ADMIN — HEPARIN SODIUM 5000 UNITS: 5000 INJECTION INTRAVENOUS; SUBCUTANEOUS at 05:47

## 2023-08-31 RX ADMIN — METRONIDAZOLE 500 MG: 500 TABLET ORAL at 21:02

## 2023-08-31 RX ADMIN — GABAPENTIN 200 MG: 250 SUSPENSION ORAL at 22:08

## 2023-08-31 RX ADMIN — GABAPENTIN 200 MG: 250 SUSPENSION ORAL at 08:19

## 2023-08-31 RX ADMIN — INSULIN GLARGINE 15 UNITS: 100 INJECTION, SOLUTION SUBCUTANEOUS at 21:08

## 2023-08-31 RX ADMIN — GLYCOPYRROLATE 0.2 MG: 0.2 INJECTION, SOLUTION INTRAMUSCULAR; INTRAVENOUS at 17:40

## 2023-08-31 RX ADMIN — ALTEPLASE 2 MG: 2.2 INJECTION, POWDER, LYOPHILIZED, FOR SOLUTION INTRAVENOUS at 10:02

## 2023-08-31 RX ADMIN — LORAZEPAM 0.5 MG: 2 INJECTION INTRAMUSCULAR; INTRAVENOUS at 02:08

## 2023-08-31 RX ADMIN — SODIUM CHLORIDE: 0.9 INJECTION, SOLUTION INTRAVENOUS at 17:08

## 2023-08-31 RX ADMIN — LIDOCAINE HYDROCHLORIDE 50 MG: 10 INJECTION, SOLUTION EPIDURAL; INFILTRATION; INTRACAUDAL; PERINEURAL at 17:16

## 2023-08-31 RX ADMIN — CARVEDILOL 12.5 MG: 12.5 TABLET, FILM COATED ORAL at 21:04

## 2023-08-31 RX ADMIN — MIDAZOLAM 2 MG: 1 INJECTION INTRAMUSCULAR; INTRAVENOUS at 17:12

## 2023-08-31 RX ADMIN — FENTANYL CITRATE 25 MCG: 50 INJECTION, SOLUTION INTRAMUSCULAR; INTRAVENOUS at 18:11

## 2023-08-31 RX ADMIN — ALTEPLASE 2 MG: 2.2 INJECTION, POWDER, LYOPHILIZED, FOR SOLUTION INTRAVENOUS at 10:14

## 2023-08-31 RX ADMIN — PROPOFOL 150 MG: 10 INJECTION, EMULSION INTRAVENOUS at 17:16

## 2023-08-31 RX ADMIN — METRONIDAZOLE 500 MG: 500 TABLET ORAL at 02:00

## 2023-08-31 RX ADMIN — VANCOMYCIN HYDROCHLORIDE 1000 MG: 1 INJECTION, SOLUTION INTRAVENOUS at 23:52

## 2023-08-31 RX ADMIN — INSULIN LISPRO 1 UNITS: 100 INJECTION, SOLUTION INTRAVENOUS; SUBCUTANEOUS at 21:08

## 2023-08-31 RX ADMIN — DEXAMETHASONE SODIUM PHOSPHATE 5 MG: 10 INJECTION, SOLUTION INTRAMUSCULAR; INTRAVENOUS at 17:20

## 2023-08-31 RX ADMIN — EPHEDRINE SULFATE 10 MG: 50 INJECTION, SOLUTION INTRAVENOUS at 17:35

## 2023-08-31 RX ADMIN — GLYCOPYRROLATE 0.2 MG: 0.2 INJECTION, SOLUTION INTRAMUSCULAR; INTRAVENOUS at 17:32

## 2023-08-31 RX ADMIN — EPHEDRINE SULFATE 10 MG: 50 INJECTION, SOLUTION INTRAVENOUS at 17:25

## 2023-08-31 RX ADMIN — FENTANYL CITRATE 50 MCG: 50 INJECTION, SOLUTION INTRAMUSCULAR; INTRAVENOUS at 17:16

## 2023-08-31 RX ADMIN — FENTANYL CITRATE 25 MCG: 50 INJECTION, SOLUTION INTRAMUSCULAR; INTRAVENOUS at 17:55

## 2023-08-31 RX ADMIN — CEFEPIME HYDROCHLORIDE 1000 MG: 1 INJECTION, SOLUTION INTRAVENOUS at 21:00

## 2023-08-31 NOTE — ASSESSMENT & PLAN NOTE
Lab Results   Component Value Date    HGBA1C 6.6 (H) 07/07/2022       Recent Labs     08/30/23  2152 08/31/23  0730 08/31/23  1109 08/31/23  1601   POCGLU 195* 123 132 110       Blood Sugar Average: Last 72 hrs:  (P) 111.2338936386738310   · Patient is on linagliptin at home  · Continue Humalog sliding scale with Accu-Cheks before every meal and at bedtime  · diabetic diet  · lantus 15 hs and lispro 5 units tid with meals

## 2023-08-31 NOTE — OCCUPATIONAL THERAPY NOTE
Occupational Therapy Cancel Note     Patient Name: Lucio James  Today's Date: 8/31/2023  Problem List  Principal Problem:    Polymicrobial bacterial infection  Active Problems:    ESRD (end stage renal disease) ([address])    Type 2 diabetes mellitus, without long-term current use of insulin ([address])    Hypertension    Status post fall    Diabetic ulcer of left midfoot associated with type 2 diabetes mellitus, limited to breakdown of skin ([address])    Acquired deformity of foot, right    Charcot's joint of right foot    Cellulitis of right foot          08/31/23 0911   Note Type   Note Type Cancelled Session  (Thursday 8/31/23)   Cancel Reasons Patient to operating room  (receving HD at bedside and per Joselin RAWLS Thompson to OR for partial 1st ray amputation.  Will cancel and continue to follow post operativelay as appropriate w/ updated weight bearing /activity restrictions.)     Naresh Vigil OTR/L  [id number]  [id number]

## 2023-08-31 NOTE — ANESTHESIA POSTPROCEDURE EVALUATION
Post-Op Assessment Note    CV Status:  Stable  Pain Score: 0    Pain management: adequate     Mental Status:  Alert and awake   Hydration Status:  Euvolemic   PONV Controlled:  Controlled   Airway Patency:  Patent      Post Op Vitals Reviewed: Yes      Staff: Anesthesiologist         No notable events documented.     /57 (08/31/23 1850)    Temp     Pulse 69 (08/31/23 1850)   Resp (!) 28 (08/31/23 1850)    SpO2 100 % (08/31/23 1850)

## 2023-08-31 NOTE — PLAN OF CARE
Serum potassium is 4.5 on 3K2.5Ca bath. Will attempt for UF-2-2.5 kg as tolerated   Problem: METABOLIC, FLUID AND ELECTROLYTES - ADULT  Goal: Electrolytes maintained within normal limits  Description: INTERVENTIONS:  - Monitor labs and assess patient for signs and symptoms of electrolyte imbalances  - Administer electrolyte replacement as ordered  - Monitor response to electrolyte replacements, including repeat lab results as appropriate  - Instruct patient on fluid and nutrition as appropriate  Outcome: Progressing  Goal: Fluid balance maintained  Description: INTERVENTIONS:  - Monitor labs   - Monitor I/O and WT  - Instruct patient on fluid and nutrition as appropriate  - Assess for signs & symptoms of volume excess or deficit  Outcome: Progressing   Post-Dialysis RN Treatment Note    Blood Pressure:  Pre 119/52 mm/Hg  Post 143/73 mmHg   EDW  tbd kg    Weight:  Pre 108.7 kg   Post 106.6 kg   Mode of weight measurement: Bed Scale   Volume Removed  2000 ml    Treatment duration 210 minutes    NS given  No    Treatment shortened?  No   Medications given during Rx None Reported   Estimated Kt/V  None Reported   Access type: Permacath/TDC   Access Issues: Yes, describe: neede cathflo due to venous pressure during hemodialysis    Report called to primary nurse   Yes         Yessenia Grady RN

## 2023-08-31 NOTE — PROGRESS NOTES
Progress Note - Infectious Disease   Merritt Woods 58 y.o. male MRN: [id number]  Unit/Bed#: 913 [facility] 421-01 Encounter: [id number]      Impression/Plan:  1. Polymicrobic bacteremia. In setting of #3, admission blood cultures 1 of 2 sets grew Granulicatella adiacens, Enterobacter cloacae, Proteus vulgaris and Bacteroides fragilis. Patient with a permacath since 1/27/22 as well as an active right foot diabetic ulcer s/p debridement as below. Permacath removed, 8/24/23 cath tip 50 col staph epi, likely contaminant. 8/24/23 TTE no vegetation seen  -continues Cefepime/Flagyl at present  -continues Vancomycin, dosed by Pharmacy  -follow up Granulicatella sensitivities as possible; microbiologist reisolated 8/31/23  -management as below for #3     2. Encephalopathy. 8/22/23 MRI Brain: limited to motion. No evident acute infarct  Increased altered mental status following HD session 8/23/23. improved  -infectious work up/management as above  -monitor mental status  -neurology on board     3. Right diabetic foot ulcer with cellulitis and concern for septic joint and osteomyelitis on MRI.  8/21/23 Wound cx: 1+ pseudomonas aeruginosa pansensitive, 2+ Enterobacter cloacae, 2+ MSF. 8/23/23 MRI right foot: 1st submetatarsal head plantar ulcer with suspected sinus tract to 1st MTP joint concerning for septic arthropathy and marrow changes of tibia/fibia/sesamoid bones c/w osteomyelitis. Right foot s/p debridement 8/24/23 with tissue cxs with predominant proteus and Enterobacter growth and 1 colony enterococcus faecalis  8/29/23 RLE angiogram with mild atherosclerotic disease, 3 vessel run off to right foot  -continue antibiotics as above  -local wound care  -ongoing podiatric follow up  -plan for R partial 1st ray resection with possible wound VAC application today 3/18/10. 4. Leukocytosis, POA. WBC 14 K. In setting of #1/3.   -continue antibiotics as above     5.  ESRD on HD via right chest permacath placed 1/2023.   -renal dose adjust antibiotic as needed  -volume/HD management per nephrology  -can change temporary to permacath this week.     6. Type 2 Diabetes Mellitus. HgbA1C 6.6  -monitor symptoms  -symptomatic management per primary care team  -advancing diet as tolerated     Antibiotics:  Vancomycin D3/Cefepime/Flagyl D10     I have discussed the above management plan in detail with patient, microbiologist, RN, and Dr. Geovanny Fox of the primary service. Subjective:  Patient has no fever, chills, sweats reported overnight; no nausea, vomiting, diarrhea; no cough, shortness of breath; no foot pain. On HD at present    Objective:  Vitals:  Temp:  [97.7 °F (36.5 °C)-98.3 °F (36.8 °C)] 97.7 °F (36.5 °C)  HR:  [56-71] 66  Resp:  [18] 18  BP: (118-134)/(52-65) 130/60  SpO2:  [89 %-97 %] 95 %  Temp (24hrs), Av °F (36.7 °C), Min:97.7 °F (36.5 °C), Max:98.3 °F (36.8 °C)  Current: Temperature: 97.7 °F (36.5 °C)    Physical Exam:   General Appearance:  58year old male, chronically debilitated, nontoxic appearance today, alert and oriented, no acute distress   HEENT: Atraumatic normocephalic   Throat: Oropharynx moist. Poor dentition   Pulmonary:   Normal respiratory excursion without accessory muscle use   Cardiac:  RRR   Abdomen:   Soft, non-tender, non-distended, protuberant   Extremities: Venous stained LEs, multiple scabbed abrasions of knees and shins. Right foot gauze/ace wrap intact with  blood-tinged plantar staining strike through around packed plantar ulcer   : No gaitan, no SPT   Psychiatric: Awake, cooperative   Skin: No new rashes. IV site nontender.  Right chest new permacath site nontender and accessed on HD at present       Labs, Imaging, & Other studies:   All pertinent labs and imaging studies were personally reviewed  Results from last 7 days   Lab Units 23  0450 23  0553 23  0501   WBC Thousand/uL 13.20* 14.70* 14.91*   HEMOGLOBIN g/dL 9.7* 9.3* 11.5*   PLATELETS Thousands/uL 302 302 386 Results from last 7 days   Lab Units 08/31/23  0450 08/30/23  0553 08/29/23  0915 08/29/23  0501   SODIUM mmol/L 135 134*  --  130*   POTASSIUM mmol/L 4.5 4.2   < > 6.2*   CHLORIDE mmol/L 101 100  --  95*   CO2 mmol/L 25 25  --  22   BUN mg/dL 47* 38*  --  57*   CREATININE mg/dL 7.76* 6.49*  --  7.97*   EGFR ml/min/1.73sq m 6 8  --  6   CALCIUM mg/dL 8.3* 8.2*  --  8.9    < > = values in this interval not displayed.      Results from last 7 days   Lab Units 08/24/23  1748   GRAM STAIN RESULT  Rare Polys  No bacteria seen  1+ Polys*  1+ Gram positive cocci in pairs*   WOUND CULTURE  1+ Growth of Proteus vulgaris group*  1+ Growth of Enterobacter cloacae*  1+ Growth of Beta Hemolytic Streptococcus Not Group A, B, C, F or G*

## 2023-08-31 NOTE — ASSESSMENT & PLAN NOTE
Polymicrobial bacteremia.  Enterobacter/Proteus/Streptococcus/Granulicatella  · Suspect cause of patient's initial metabolic encephalopathy which has improved with abx treatment  · ID following  · continue cefepime and flagyl  · Started on vancomycin 8/29  · Afebrile  · Monitor fever curve and cbc  · Going to the OR with podiatry on 8/31

## 2023-08-31 NOTE — ASSESSMENT & PLAN NOTE
Lab Results   Component Value Date    EGFR 6 08/31/2023    EGFR 8 08/30/2023    EGFR 6 08/29/2023    CREATININE 7.76 (H) 08/31/2023    CREATININE 6.49 (H) 08/30/2023    CREATININE 7.97 (H) 08/29/2023   · Patient is on Tuesday Thursday Saturday schedule at [address]  · IJ  permcath removed, temporary  catheter placed 8/25  · Nephrology following  · Patient cleared by ID for permacath placement which he underwent on 8/30. · Hemodialysis on 8/31; dialysis nurse reported some difficulty with occlusion, administered Cathflo successfully and patient was able to complete his dialysis session without difficulties.

## 2023-08-31 NOTE — PROGRESS NOTES
Cristhian Castro is a 58 y.o. male who is currently ordered Vancomycin IV with management by the Pharmacy Consult service. Relevant clinical data and objective / subjective history reviewed. Vancomycin Assessment:  Indication and Goal AUC/Trough: Bone/joint infection (goal -600, trough >10), -600, trough >10  Clinical Status: stable  Micro:    Bone culture is positive for Proteus, Enterobacter,E faecalis. Renal Function:  SCr: 7.76 mg/dL  CrCl: 12.8 mL/min  Renal replacement: HD  Days of Therapy: 3  Current Dose: 1000 mg IV 3 times weekly post HD on Tuesday, Thursday, Saturday  Vancomycin Plan:  Continue as planned : A dose will be given today after HD   Estimated AUC: 400-600 mcg*hr/mL  Estimated Trough: >10 mcg/mL  Next Level: 9/2/23 @0600  Renal Function Monitoring: Daily BMP and [place] will continue to follow closely for s/sx of nephrotoxicity, infusion reactions and appropriateness of therapy. BMP and CBC will be ordered per protocol. We will continue to follow the patient’s culture results and clinical progress daily.     Destin Bennett, Pharmacist

## 2023-08-31 NOTE — PROGRESS NOTES
[address]  Progress Note  Name: Charity Ortiz  MRN: [id number]  Unit/Bed#: OR POOL I Date of Admission: 8/19/2023   Date of Service: 8/31/2023 I Hospital Day: 12    Assessment/Plan   * Polymicrobial bacterial infection  Assessment & Plan  Polymicrobial bacteremia.  Enterobacter/Proteus/Streptococcus/Granulicatella  · Suspect cause of patient's initial metabolic encephalopathy which has improved with abx treatment  · ID following  · continue cefepime and flagyl  · Started on vancomycin 8/29  · Afebrile  · Monitor fever curve and cbc  · Going to the OR with podiatry on 8/31     Diabetic ulcer of left midfoot associated with type 2 diabetes mellitus, limited to breakdown of skin [facility])  Assessment & Plan  Lab Results   Component Value Date    HGBA1C 6.6 (H) 07/07/2022       Recent Labs     08/30/23  2152 08/31/23  0730 08/31/23  1109 08/31/23  1601   POCGLU 195* 123 132 110       Blood Sugar Average: Last 72 hrs:  (P) [id number]     Podiatry consulted for [place] grade 1 ulcer submetatarsal 1 left foot with cellulitis and concern for septic joint and osteomyelitis on MRI  · S/P Debridement 8/21 and 8/24  · Noted to have elevated CRP, procalcitonin  · Wound culture 8/21 growing 1 + pseudomonas aeruginosa, 2 + enterobacter cloacae  · Wound culture 8/24 growing proteus, enterobacter, enterococcus  · MRI R foot: First submetatarsal head plantar skin ulceration with suspected draining sinus tract extending to the first metatarsal phalangeal joint suspicious for septic arthropathy  · Vascular consulted as imaging showing lower limb diffuse atherosclerotic disease noted throughout with elevated PSV of proximal popliteal artery and tibial peroneal trunk suggesting less than 50% stenosis, metatarsal pressure greater than 200, great toe pressure 95 mmHg within the healing range on right side.   Left lower limb showed diffuse atherosclerotic disease noted throughout with a 50 to 75% stenosis of the proximal popliteal artery, metatarsal pressure 170, great toe pressure 98 mmHg within healing range. · angiogram by IR  8/29/23  · Podiatry took patient to the OR for right first ray partial resection and possible application of wound VAC on 8/31      Status post fall  Assessment & Plan  Patient sustained a fall about 2 days prior to admission   · Etiology not clear-EKG was unremarkable. Blood sugar was 157. Questionable hypoglycemia versus accidental fall. Patient not exhibiting any signs of stroke. · Telemetry showed no evidence of arrhythmia  · Patient does not remember how he fell. Patient was found on the floor and could not get up. · PT/OT evaluation and treatment, recommending home with home health currently, however patient feels that he is deconditioned and is concerned about his status postoperatively. Continue to follow  · CAT scan of the brain, CT chest abdomen pelvis, cervical spine and lumbar reconstructions study was ordered in the ED but patient refused the studies  · Patient got CT of the head and neck after receiving Ativan which was unremarkable  · Fall precautions    ESRD (end stage renal disease) [facility])  Assessment & Plan  Lab Results   Component Value Date    EGFR 6 08/31/2023    EGFR 8 08/30/2023    EGFR 6 08/29/2023    CREATININE 7.76 (H) 08/31/2023    CREATININE 6.49 (H) 08/30/2023    CREATININE 7.97 (H) 08/29/2023   · Patient is on Tuesday Thursday Saturday schedule at [address]  · IJ  permcath removed, temporary  catheter placed 8/25  · Nephrology following  · Patient cleared by ID for permacath placement which he underwent on 8/30. · Hemodialysis on 8/31; dialysis nurse reported some difficulty with occlusion, administered Cathflo successfully and patient was able to complete his dialysis session without difficulties.     Type 2 diabetes mellitus, without long-term current use of insulin [facility])  Assessment & Plan  Lab Results   Component Value Date    HGBA1C 6.6 (H) 07/07/2022 Recent Labs     08/30/23  2152 08/31/23  0730 08/31/23  1109 08/31/23  1601   POCGLU 195* 123 132 110       Blood Sugar Average: Last 72 hrs:  (P) [id number]   · Patient is on linagliptin at home  · Continue Humalog sliding scale with Accu-Cheks before every meal and at bedtime  · diabetic diet  · lantus 15 hs and lispro 5 units tid with meals    Hypertension  Assessment & Plan  · Continue Procardia 30 mg p.o. twice daily, Coreg 12.5 mg p.o. twice daily  · Vital signs as per unit routine               VTE Pharmacologic Prophylaxis: VTE Score: 2 Moderate Risk (Score 3-4) - Pharmacological DVT Prophylaxis Ordered: heparin. Patient Centered Rounds: I performed bedside rounds with nursing staff today. Discussions with Specialists or Other Care Team Provider: Multidisciplinary team     Education and Discussions with Family / Patient: patient indicated he would update his family . Total Time Spent on Date of Encounter in care of patient: 45 minutes This time was spent on one or more of the following: performing physical exam; counseling and coordination of care; obtaining or reviewing history; documenting in the medical record; reviewing/ordering tests, medications or procedures; communicating with other healthcare professionals and discussing with patient's family/caregivers. Current Length of Stay: 12 day(s)  Current Patient Status: Inpatient   Certification Statement: The patient will continue to require additional inpatient hospital stay due to OR, IV abx, repeat labs  Discharge Plan: Anticipate discharge in 24-48 hrs to rehab facility. Code Status: Level 1 - Full Code    Subjective:   Patient seen Kathy Joaquin in bed receiving dialysis. He is frustrated because the catheter is becoming occluded intermittently. Verbal reassurance provided to the patient that we would get it so he will get his dialysis.   Patient reports that he does feel some anxiety since he will be going to the OR later today.  Does not want any antianxiety medication at this time. Denies any headache, dizziness, chest pain or shortness of breath. Objective:     Vitals:   Temp (24hrs), Av.4 °F (36.3 °C), Min:97 °F (36.1 °C), Max:97.7 °F (36.5 °C)    Temp:  [97 °F (36.1 °C)-97.7 °F (36.5 °C)] 97 °F (36.1 °C)  HR:  [56-71] 69  Resp:  [12-28] 28  BP: (118-144)/(52-73) 133/57  SpO2:  [89 %-100 %] 100 %  Body mass index is 33.38 kg/m². Input and Output Summary (last 24 hours): Intake/Output Summary (Last 24 hours) at 2023 1852  Last data filed at 2023 1601  Gross per 24 hour   Intake 500 ml   Output 3436 ml   Net -2936 ml       Physical Exam:   Physical Exam  Vitals and nursing note reviewed. Constitutional:       Appearance: Normal appearance. HENT:      Head: Normocephalic. Nose: Nose normal.      Mouth/Throat:      Mouth: Mucous membranes are moist.   Eyes:      Extraocular Movements: Extraocular movements intact. Conjunctiva/sclera: Conjunctivae normal.      Pupils: Pupils are equal, round, and reactive to light. Cardiovascular:      Rate and Rhythm: Normal rate and regular rhythm. Pulses: Normal pulses. Heart sounds: Normal heart sounds. Pulmonary:      Effort: Pulmonary effort is normal.      Breath sounds: Normal breath sounds. Abdominal:      General: Bowel sounds are normal. There is no distension. Palpations: Abdomen is soft. Tenderness: There is no abdominal tenderness. Musculoskeletal:         General: Normal range of motion. Cervical back: Normal range of motion. Skin:     General: Skin is dry. Capillary Refill: Capillary refill takes less than 2 seconds. Comments: Right plantar foot ulcer present   Neurological:      General: No focal deficit present. Mental Status: He is alert and oriented to person, place, and time.    Psychiatric:         Mood and Affect: Mood normal.         Behavior: Behavior normal.         Thought Content: Thought content normal.         Judgment: Judgment normal.          Additional Data:     Labs:  Results from last 7 days   Lab Units 08/31/23  0450   WBC Thousand/uL 13.20*   HEMOGLOBIN g/dL 9.7*   HEMATOCRIT % 31.1*   PLATELETS Thousands/uL 302     Results from last 7 days   Lab Units 08/31/23  0450 08/29/23  0501 08/27/23  0455   SODIUM mmol/L 135   < > 131*   POTASSIUM mmol/L 4.5   < > 3.8   CHLORIDE mmol/L 101   < > 95*   CO2 mmol/L 25   < > 27   BUN mg/dL 47*   < > 36*   CREATININE mg/dL 7.76*   < > 5.84*   ANION GAP mmol/L 9   < > 9   CALCIUM mg/dL 8.3*   < > 7.8*   ALBUMIN g/dL  --   --  3.1*   GLUCOSE RANDOM mg/dL 106   < > 140    < > = values in this interval not displayed. Results from last 7 days   Lab Units 08/31/23  1601 08/31/23  1109 08/31/23  0730 08/30/23  2152 08/30/23  1627 08/30/23  1111 08/30/23  0743 08/29/23  2040 08/29/23  1609 08/29/23  1056 08/29/23  0713 08/28/23 2010   POC GLUCOSE mg/dl 110 132 123 195* 94 118 112 141* 136 132 130 161*               Lines/Drains:  Invasive Devices     Peripheral Intravenous Line  Duration           Peripheral IV 08/29/23 Left;Ventral (anterior) Forearm 2 days    Peripheral IV 08/30/23 Distal;Left;Ventral (anterior) Forearm <1 day    Peripheral IV 08/31/23 Left Wrist <1 day          Hemodialysis Catheter  Duration           HD Permanent Double Catheter 1 day                      Imaging: No pertinent imaging reviewed.     Recent Cultures (last 7 days):         Last 24 Hours Medication List:   Current Facility-Administered Medications   Medication Dose Route Frequency Provider Last Rate   • [MAR Hold] acetaminophen  650 mg Oral Q6H PRN Coco Mak MD     • [MAR Hold] acetaminophen  975 mg Oral Q8H Coco Mak MD     • [MAR Hold] atorvastatin  80 mg Oral Daily Freddy Carrasquillo DO     • [MAR Hold] carvedilol  12.5 mg Oral BID Coco Mak MD     • [facility] Hold] cefepime  1,000 mg Intravenous Q24H Jaren Méndez DO 1,000 mg (08/30/23 1952) • chlorhexidine  15 mL Swish & Spit Once [facility], DPM     • diphenhydrAMINE  12.5 mg Intravenous Once PRN Og Lopez MD     • [facility] Hold] famotidine  10 mg Oral Daily Katerina Teixeira MD     • fentaNYL  50 mcg Intravenous Q5 Min PRN Dinorah Bajwa MD     • [facility] Hold] gabapentin  200 mg Oral BID Jaren Méndez, DO     • [MAR Hold] haloperidol lactate  2 mg Intramuscular Q6H PRN Jaren Méndez, DO     • [MAR Hold] heparin (porcine)  5,000 Units Subcutaneous Q8H [facility] Katerina Teixeira MD     • HYDROmorphone  0.5 mg Intravenous Q10 Min PRN Og Lopez MD     • [MAR Hold] HYDROmorphone  1 mg Intravenous Q4H PRN Katerina Teixeira MD     • [MAR Hold] insulin glargine  15 Units Subcutaneous HS Jaren Méndez, DO     • [MAR Hold] insulin lispro  1-5 Units Subcutaneous HS Katerina Teixeira MD     • [MAR Hold] insulin lispro  2-12 Units Subcutaneous TID AC Katerina Teixeira MD     • [MAR Hold] insulin lispro  5 Units Subcutaneous TID With Meals Jaren Méndez, DO     • [MAR Hold] lidocaine  1 patch Topical Daily Katerina Teixeira MD     • [MAR Hold] LORazepam  0.5 mg Intravenous Q6H PRN Katerina Teixeira MD     • [MAR Hold] methocarbamol  500 mg Oral Q12H PRN Katerina Teixeira MD     • [MAR Hold] metroNIDAZOLE  500 mg Oral Q8H [facility] Jaren Méndez, DO     • [MAR Hold] NIFEdipine  30 mg Oral BID Katerina Teixeira MD     • [MAR Hold] ondansetron  4 mg Intravenous Q6H PRN Joel Dalton MD     • ondansetron  4 mg Intravenous Once PRN Og Lopez MD     • [facility] Hold] oxyCODONE  5 mg Oral Q8H PRN Katerina Teixeira MD     • [MAR Hold] senna-docusate sodium  2 tablet Oral BID Jaren Méndez DO     • [MAR Hold] sevelamer  800 mg Oral TID With Meals Katerina Teixeira MD     • [MAR Hold] vancomycin  10 mg/kg (Adjusted) Intravenous Once per day on Tue Thu Sat Gloria Guaman PA-C          Today, Patient Was Seen By: GRANT Cheema    **Please Note: This note may have been constructed using a voice recognition system. **

## 2023-08-31 NOTE — OP NOTE
OPERATIVE REPORT - Podiatry  PATIENT NAME: Marjorie Franco    :  1960  MRN: [id number]  Pt Location: [place] OR ROOM 03    SURGERY DATE: 2023    Surgeon(s) and Role:     Yazmin Yung DPM - Primary    Pre-op Diagnosis:  Open wound of plantar aspect of right foot [S91.301A]  Subacute osteomyelitis of right foot (HCC) [M86.271]  Septic arthritis of right foot, due to unspecified organism ([address]) [M00.9]    Post-Op Diagnosis Codes:     * Open wound of plantar aspect of right foot [S91.301A]     * Subacute osteomyelitis of right foot (HCC) [R98.002]     * Septic arthritis of right foot, due to unspecified organism ([address]) [M00.9]    Procedure(s) (LRB):  RIGHT PARTIAL 1ST RAY RESECTION WITH  WOUND VAC APPLICATION (Right)    Specimen(s):  ID Type Source Tests Collected by Time Destination   1 : 1st ray MTPJ Tissue Foot, Right TISSUE Emi EUGENIA Becker 2023 1741    A : right partial 1st ray culture PRE lavage  Wound Foot, Right ANAEROBIC CULTURE AND GRAM STAIN, WOUND CULTURE Yazmin Yung [facility] 2023 1738    B : right partial 1st ray culture POST lavage  Wound Foot, Right ANAEROBIC CULTURE AND GRAM STAIN, WOUND CULTURE Yazmin Yung [facility] 2023 1739        Estimated Blood Loss:   Minimal    Drains:  * No LDAs found *    Anesthesia Type:   Choice with 20 ml of 1% Lidocaine and 0.25% Bupivacaine in a 1:1 mixture    Hemostasis:  -Electrocoagulation  -Atraumatic technique    Materials:  * No implants in log *  -4-0 Monocryl  -3-0 nylon    Operative Findings:  Partial first ray resection performed. The remaining portion of the first metatarsal bone was white in color, hard when touched with instrumentation, with adequate intramedullary bleeding noted these are signs of healthy bone. This procedure will be considered a surgical cure for the patient's osteomyelitis. During dissection, a pocket of purulence was noted extending proximally along the extensor hallucis longus tendon sheath.   This area was cultured prior to and after thorough irrigation. Follow-up both sets of cultures. Because of purulence noted during dissection the decision was made not to close the incision site and to apply a wound VAC instead. Patient may undergo secondary debridement with delayed primary closure pending appearance of wound. Complications:   None    Procedure and Technique:     Under mild sedation, the patient was brought into the operating room and placed on the operating room table in the supine position. IV sedation was achieved by anesthesia team and a universal timeout was performed where all parties are in agreement of correct patient, correct procedure and correct site. A pneumatic tourniquet was then placed over the patient's right lower extremity with ample padding. A Nixon block was performed consisting of 20 ml of 1% Lidocaine and 0.25% Bupivacaine in a 1:1 mixture. The foot was then prepped and draped in the usual aseptic manner. Attention was then directed to the right medial foot. A toe fillet flap was drawn out utilizing a marker in a fashion to include excision of patient's submetatarsal 1 ulceration. Surgical blade was then utilized to create a full depth incision down to bone in accordance with the drawn incision line. The central wedge of skin including the patient's ulceration was fully excised and passed off the field. Gross purulence was noted surrounding the first MTPJ including the interval between the tibial sesamoid and the first metatarsal, as well as dorsally along the course of the extensor hallucis longus. This area of purulence was cultured. A sagittal saw was then utilized to make a through and through osteotomy of the first metatarsal approximately 1 to 2 cm proximal to the first MTPJ. The osteotomy was angled in a fashion to reduce pressure to the plantar and medial aspects of the foot.   The first ray including the distal portion of the first metatarsal, proximal phalanx, distal phalanx was dissected out of the foot and passed off the field for pathological examination. The underlying first metatarsal bone was examined and was found to be white in color, hard when touched with instrumentation, with adequate bleeding noted to the medullary canal.  These are signs of healthy bone. The incision site was then thoroughly irrigated with cystoscopy tubing and 2 L normal sterile saline. Post lavage, cultures were taken from the area of previous purulence. The soft tissues were inspected and no pockets of purulence were noted. The incision site was then partially closed utilizing 4-0 Monocryl for subcutaneous closure and 3-0 nylon in retention suture and simple interrupted techniques. The remaining open area of the wound measured approximately 3.3 cm x 2.7 cm x 1.7 cm. Hemostasis was achieved utilizing electrocoagulation. The foot was then cleansed and dried. The incision site was dressed with Adaptic and the remaining open portion of the wound was dressed with 2 black wound VAC sponges. His dressings were then secured with Tegaderm followed by 4 x 4 gauze, ABD, Kerlix, and a 4 inch Ace bandage. Normal hyperemic response was noted to all digits. The patient tolerated the procedure and anesthesia well without immediate complications and transferred to PACU with vital signs stable. As with many limb salvage procedures, we contemplate the possibility of performing further stages to this procedure. Procedures may include debridements, delayed closure, plastic surgery techniques, or more proximal amputations. This procedure may be considered part of a multi-staged limb salvage treatment plan. I was present during the entire procedure and participated in all key aspects. Jatin Swenson DPM  DATE: August 31, 2023  TIME: 6:43 PM      Portions of the record may have been created with voice recognition software.  Occasional wrong word or "sound a like" substitutions may have occurred due to the inherent limitations of voice recognition software. Read the chart carefully and recognize, using context, where substitutions have occurred.

## 2023-08-31 NOTE — ANESTHESIA PREPROCEDURE EVALUATION
Procedure:  RAY RESECTION FOOT (Right: Foot)    Relevant Problems   CARDIO   (+) Hypertension      ENDO   (+) Diabetes mellitus type 2 with peripheral artery disease (HCC)   (+) Type 2 diabetes mellitus, without long-term current use of insulin (HCC)      /RENAL   (+) ESRD (end stage renal disease) (HCC)   (+) Hemodialysis status (HCC)      HEMATOLOGY   (+) Anemia      MUSCULOSKELETAL   (+) Charcot's joint of right foot   (+) Traumatic rhabdomyolysis (HCC)      NEURO/PSYCH   (+) Diabetic polyneuropathy associated with type 2 diabetes mellitus (HCC)      Endocrine   (+) 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 mellitus, with bone involvement without evidence of necrosis (HCC)   (+) 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 mellitus, with necrosis of bone (HCC)      Musculoskeletal and Integument   (+) Cellulitis of right foot      Other   (+) Polymicrobial bacterial infection   (+) Subacute osteomyelitis of right foot [facility])   • Patient is on Tuesday Thursday Saturday schedule at [facility]  • IJ  permcath removed, temporary  catheter placed 8/25  • Nephrology following  • Patient cleared by ID for permacath placement which he underwent on 8/30. Anticipate hemodialysis on 8/31     Admitted to [facility] in May: HOSPITAL COURSE:  Patient was admitted for acute uremic syndrome due to missing 2 sessions of hemodialysis last week. Also found to have new A-fib with slightly elevated heart rate. Status post 3 sessions of dialysis back-to-back with improvement. Regarding A-fib, Coreg dose was doubled and he was started on Eliquis for A/C. Will follow up closely with Cardiology post discharge. Strongly advised to not miss dialysis sessions in the future.      Finished HD an hour ago   Latest Reference Range & Units Most Recent   Potassium 3.5 - 5.3 mmol/L 4.5  8/31/23 04:50      Latest Reference Range & Units Most Recent   Hemoglobin 12.0 - 17.0 g/dL 9.7 (L)  8/31/23 04:50   (L): Data is abnormally low    Physical Exam    Airway    Mallampati score: III  TM Distance: >3 FB  Neck ROM: full     Dental   Comment: Denies loose teeth,     Cardiovascular  Cardiovascular exam normal    Pulmonary  Pulmonary exam normal     Other Findings  Portions of exam deferred due to low yield and/or unknown COVID status      Anesthesia Plan  ASA Score- 4     Anesthesia Type- general with ASA Monitors. Additional Monitors:   Airway Plan: LMA. Plan Factors-    Chart reviewed. Existing labs reviewed. Patient summary reviewed. Patient is not a current smoker. Induction- intravenous. Postoperative Plan-     Informed Consent- Anesthetic plan and risks discussed with patient. I personally reviewed this patient with the CRNA. Discussed and agreed on the Anesthesia Plan with the CRNA. Jennyfer Cancino

## 2023-08-31 NOTE — ASSESSMENT & PLAN NOTE
Lab Results   Component Value Date    HGBA1C 6.6 (H) 07/07/2022       Recent Labs     08/30/23  2152 08/31/23  0730 08/31/23  1109 08/31/23  1601   POCGLU 195* 123 132 110       Blood Sugar Average: Last 72 hrs:  (P) [id number]     Podiatry consulted for Delgadillo grade 1 ulcer submetatarsal 1 left foot with cellulitis and concern for septic joint and osteomyelitis on MRI  · S/P Debridement 8/21 and 8/24  · Noted to have elevated CRP, procalcitonin  · Wound culture 8/21 growing 1 + pseudomonas aeruginosa, 2 + enterobacter cloacae  · Wound culture 8/24 growing proteus, enterobacter, enterococcus  · MRI R foot: First submetatarsal head plantar skin ulceration with suspected draining sinus tract extending to the first metatarsal phalangeal joint suspicious for septic arthropathy  · Vascular consulted as imaging showing lower limb diffuse atherosclerotic disease noted throughout with elevated PSV of proximal popliteal artery and tibial peroneal trunk suggesting less than 50% stenosis, metatarsal pressure greater than 200, great toe pressure 95 mmHg within the healing range on right side. Left lower limb showed diffuse atherosclerotic disease noted throughout with a 50 to 75% stenosis of the proximal popliteal artery, metatarsal pressure 170, great toe pressure 98 mmHg within healing range.   · angiogram by IR  8/29/23  · Podiatry took patient to the OR for right first ray partial resection and possible application of wound VAC on 8/31

## 2023-08-31 NOTE — PROGRESS NOTES
Podiatry - Progress Note  Patient: Daisha Samuel 58 y.o. male   MRN: [id number]  PCP: Abdulkadir Escamilla MD  Unit/Bed#: [address] [facility] [place] 421-01 Encounter: [id number]  Date Of Visit: 23    ASSESSMENT:    Daisha Samuel is a 58 y.o. male with:    1. Right submet 1 ulceration probing to bone with underlying osteomyelitis - Delgadillo 3  2. Right 1st MTPJ septic arthritis  3. T2DM with peripheral neuropathy  4. ESRD    PLAN:    · Patient to go to OR today,23, for  Right partial 1st ray resection with possible wound VAC application. · Consent signed by both patient and myself  · Confirmed NPO status. · H&P, vitals, and current labs reviewed. No acute changes noted. · Alternatives, risks, and complications discussed with patient. · All questions answered. No guarantees given to outcome of procedure. · Rest of medical care per primary team.     SUBJECTIVE:     The patient was seen, evaluated, and assessed at bedside today. The patient was awake, alert, and in no acute distress. Patient confirmed NPO status. All questions and concerns regarding the surgical procedure addressed. Patient understands risks vs benefits of procedure and remains amenable with plan for surgery today. Patient denies N/V/F/chills/SOB/CP. OBJECTIVE:     Vitals:   /54   Pulse 62   Temp 97.7 °F (36.5 °C) (Oral)   Resp (P) 18   Ht 6' 1" (1.854 m)   Wt 115 kg (253 lb)   SpO2 97%   BMI 33.38 kg/m²     Temp (24hrs), Av °F (36.7 °C), Min:97.7 °F (36.5 °C), Max:98.3 °F (36.8 °C)      Physical Exam:     General:  Alert, cooperative, and in no distress. Lower extremity exam:  Cardiovascular status at baseline. Neurological status at baseline. Musculoskeletal status at baseline. No calf tenderness noted bilaterally. Dressing left intact to the Operating Room.      Additional Data:     Labs:    Results from last 7 days   Lab Units 23  0450   WBC Thousand/uL 13.20*   HEMOGLOBIN g/dL 9.7*   HEMATOCRIT % 31.1*   PLATELETS Thousands/uL 302     Results from last 7 days   Lab Units 08/31/23  0450   POTASSIUM mmol/L 4.5   CHLORIDE mmol/L 101   CO2 mmol/L 25   BUN mg/dL 47*   CREATININE mg/dL 7.76*   CALCIUM mg/dL 8.3*           * I Have Reviewed All Lab Data Listed Above. Recent Cultures (last 7 days):     Results from last 7 days   Lab Units 08/24/23  1748   GRAM STAIN RESULT  Rare Polys  No bacteria seen  1+ Polys*  1+ Gram positive cocci in pairs*   WOUND CULTURE  1+ Growth of Proteus vulgaris group*  1+ Growth of Enterobacter cloacae*  1+ Growth of Beta Hemolytic Streptococcus Not Group A, B, C, F or G*           Imaging: I have personally reviewed pertinent films in PACS  EKG, Pathology, and Other Studies: I have personally reviewed pertinent reports. ** Please Note: Portions of the record may have been created with voice recognition software. Occasional wrong word or "sound a like" substitutions may have occurred due to the inherent limitations of voice recognition software. Read the chart carefully and recognize, using context, where substitutions have occurred.  **

## 2023-08-31 NOTE — PHYSICAL THERAPY NOTE
PT Cancellation Note       08/31/23 1021   Note Type   Note Type Cancelled Session   Cancel Reasons Patient to operating room  (Pt on dialysis this AM, to OR this afternoon for R TMA.  Will follow-up as tolerated/appropriate.)   Licensure   [place] License Number  [id number]

## 2023-08-31 NOTE — PLAN OF CARE
Problem: Potential for Falls  Goal: Patient will remain free of falls  Description: INTERVENTIONS:  - Educate patient/family on patient safety including physical limitations  - Instruct patient to call for assistance with activity   - Consult OT/PT to assist with strengthening/mobility   - Keep Call bell within reach  - Keep bed low and locked with side rails adjusted as appropriate  - Keep care items and personal belongings within reach  - Initiate and maintain comfort rounds  - Make Fall Risk Sign visible to staff  - Apply yellow socks and bracelet for high fall risk patients  - Consider moving patient to room near nurses station  Outcome: Progressing     Problem: MOBILITY - ADULT  Goal: Maintain or return to baseline ADL function  Description: INTERVENTIONS:  - Educate patient/family on patient safety including physical limitations  - Instruct patient to call for assistance with activity   - Consult OT/PT to assist with strengthening/mobility   - Keep Call bell within reach  - Keep bed low and locked with side rails adjusted as appropriate  - Keep care items and personal belongings within reach  - Initiate and maintain comfort rounds  - Make Fall Risk Sign visible to staff  - Apply yellow socks and bracelet for high fall risk patients  - Consider moving patient to room near nurses station  Outcome: Progressing  Goal: Maintains/Returns to pre admission functional level  Description: INTERVENTIONS:  - Perform BMAT or MOVE assessment daily.   - Set and communicate daily mobility goal to care team and patient/family/caregiver.    - Collaborate with rehabilitation services on mobility goals if consulted  - Ambulate patient 3 times a day  - Out of bed to chair 3 times a day   - Out of bed for meals 3 times a day  - Out of bed for toileting  - Record patient progress and toleration of activity level   Outcome: Progressing     Problem: PAIN - ADULT  Goal: Verbalizes/displays adequate comfort level or baseline comfort level  Description: Interventions:  - Encourage patient to monitor pain and request assistance  - Assess pain using appropriate pain scale  - Administer analgesics based on type and severity of pain and evaluate response  - Implement non-pharmacological measures as appropriate and evaluate response  - Consider cultural and social influences on pain and pain management  - Notify physician/advanced practitioner if interventions unsuccessful or patient reports new pain  Outcome: Progressing     Problem: INFECTION - ADULT  Goal: Absence or prevention of progression during hospitalization  Description: INTERVENTIONS:  - Assess and monitor for signs and symptoms of infection  - Monitor lab/diagnostic results  - Monitor all insertion sites, i.e. indwelling lines, tubes, and drains  - Monitor endotracheal if appropriate and nasal secretions for changes in amount and color  - Kiana appropriate cooling/warming therapies per order  - Administer medications as ordered  - Instruct and encourage patient and family to use good hand hygiene technique  - Identify and instruct in appropriate isolation precautions for identified infection/condition  Outcome: Progressing  Goal: Absence of fever/infection during neutropenic period  Description: INTERVENTIONS:  - Monitor WBC    Outcome: Progressing     Problem: SAFETY ADULT  Goal: Patient will remain free of falls  Description: INTERVENTIONS:  - Educate patient/family on patient safety including physical limitations  - Instruct patient to call for assistance with activity   - Consult OT/PT to assist with strengthening/mobility   - Keep Call bell within reach  - Keep bed low and locked with side rails adjusted as appropriate  - Keep care items and personal belongings within reach  - Initiate and maintain comfort rounds  - Make Fall Risk Sign visible to staff  - Apply yellow socks and bracelet for high fall risk patients  - Consider moving patient to room near nurses station  Outcome: Progressing  Goal: Maintain or return to baseline ADL function  Description: INTERVENTIONS:  - Educate patient/family on patient safety including physical limitations  - Instruct patient to call for assistance with activity   - Consult OT/PT to assist with strengthening/mobility   - Keep Call bell within reach  - Keep bed low and locked with side rails adjusted as appropriate  - Keep care items and personal belongings within reach  - Initiate and maintain comfort rounds  - Make Fall Risk Sign visible to staff  - Apply yellow socks and bracelet for high fall risk patients  - Consider moving patient to room near nurses station  Outcome: Progressing  Goal: Maintains/Returns to pre admission functional level  Description: INTERVENTIONS:  - Perform BMAT or MOVE assessment daily.   - Set and communicate daily mobility goal to care team and patient/family/caregiver.    - Collaborate with rehabilitation services on mobility goals if consulted  - Ambulate patient 3 times a day  - Out of bed to chair 3 times a day   - Out of bed for meals 3 times a day  - Out of bed for toileting  - Record patient progress and toleration of activity level   Outcome: Progressing     Problem: DISCHARGE PLANNING  Goal: Discharge to home or other facility with appropriate resources  Description: INTERVENTIONS:  - Identify barriers to discharge w/patient and caregiver  - Arrange for needed discharge resources and transportation as appropriate  - Identify discharge learning needs (meds, wound care, etc.)  - Arrange for interpretive services to assist at discharge as needed  - Refer to Case Management Department for coordinating discharge planning if the patient needs post-hospital services based on physician/advanced practitioner order or complex needs related to functional status, cognitive ability, or social support system  Outcome: Progressing     Problem: Knowledge Deficit  Goal: Patient/family/caregiver demonstrates understanding of disease process, treatment plan, medications, and discharge instructions  Description: Complete learning assessment and assess knowledge base. Interventions:  - Provide teaching at level of understanding  - Provide teaching via preferred learning methods  Outcome: Progressing     Problem: METABOLIC, FLUID AND ELECTROLYTES - ADULT  Goal: Electrolytes maintained within normal limits  Description: INTERVENTIONS:  - Monitor labs and assess patient for signs and symptoms of electrolyte imbalances  - Administer electrolyte replacement as ordered  - Monitor response to electrolyte replacements, including repeat lab results as appropriate  - Instruct patient on fluid and nutrition as appropriate  Outcome: Progressing  Goal: Fluid balance maintained  Description: INTERVENTIONS:  - Monitor labs   - Monitor I/O and WT  - Instruct patient on fluid and nutrition as appropriate  - Assess for signs & symptoms of volume excess or deficit  Outcome: Progressing     Problem: Nutrition/Hydration-ADULT  Goal: Nutrient/Hydration intake appropriate for improving, restoring or maintaining nutritional needs  Description: Monitor and assess patient's nutrition/hydration status for malnutrition. Collaborate with interdisciplinary team and initiate plan and interventions as ordered. Monitor patient's weight and dietary intake as ordered or per policy. Utilize nutrition screening tool and intervene as necessary. Determine patient's food preferences and provide high-protein, high-caloric foods as appropriate.      INTERVENTIONS:  - Monitor oral intake, urinary output, labs, and treatment plans  - Assess nutrition and hydration status and recommend course of action  - Evaluate amount of meals eaten  - Assist patient with eating if necessary   - Allow adequate time for meals  - Recommend/ encourage appropriate diets, oral nutritional supplements, and vitamin/mineral supplements  - Order, calculate, and assess calorie counts as needed  - Assess need for intravenous fluids  - Provide specific nutrition/hydration education as appropriate  - Include patient/family/caregiver in decisions related to nutrition  Outcome: Progressing     Problem: SAFETY,RESTRAINT: NV/NON-SELF DESTRUCTIVE BEHAVIOR  Goal: Remains free of harm/injury (restraint for non violent/non self-detsructive behavior)  Description: INTERVENTIONS:  - Instruct patient/family regarding restraint use   - Assess and monitor physiologic and psychological status   - Provide interventions and comfort measures to meet assessed patient needs   - Identify and implement measures to help patient regain control  - Assess readiness for release of restraint   Outcome: Progressing  Goal: Returns to optimal restraint-free functioning  Description: INTERVENTIONS:  - Assess the patient's behavior and symptoms that indicate continued need for restraint  - Identify and implement measures to help patient regain control  - Assess readiness for release of restraint   Outcome: Progressing     Problem: Prexisting or High Potential for Compromised Skin Integrity  Goal: Skin integrity is maintained or improved  Description: INTERVENTIONS:  - Identify patients at risk for skin breakdown  - Assess and monitor skin integrity  - Assess and monitor nutrition and hydration status  - Monitor labs   - Assess for incontinence   - Turn and reposition patient  - Assist with mobility/ambulation  - Relieve pressure over bony prominences  - Avoid friction and shearing  - Provide appropriate hygiene as needed including keeping skin clean and dry  - Evaluate need for skin moisturizer/barrier cream  - Collaborate with interdisciplinary team   - Patient/family teaching  - Consider wound care consult   Outcome: Progressing

## 2023-08-31 NOTE — PROCEDURES
NEPHROLOGY DIALYSIS PROCEDURE NOTE      Patient seen and examined on hemodialysis, tolerating procedure well. All documentation, labs, medications were reviewed by myself, and the treatment plan was reviewed with nurse and patient. Seen on Dialysis at : 11:35 AM  Dialysis Access: Right IJ tunneled PermCath, placed yesterday August 30  Vitals: 134/59  Dialysis time: 3.5 hours  Dialyzer: F160  Sodium bath: 138  Potassium bath: 3 K+  Bicarbonate bath: 35  Calcium bath: 2.5  Ultrafiltration: 2-2.5 L  Blood flow rate: 350 cc/min  Dialysis flow rate: 600 cc/min  Dialysis temperature: 36C  Medications given on HD: Cathflo      Summary:    59 yo man with PMH of ESRD on HD TTS p/w AMS.   Nephrology is consulted for management of ESRD                ASSESSMENT and PLAN:     End Stage Kidney Disease  -Modality:In-Center Hemodialysis  -Schedule: Tuesday/Thursday/Saturday  -Dialysis [facility]  -Access: New permacath placed on August 30  -Presciption: Reviewed  -Status:  Latest blood cultures are no growth. Undergoing dialysis at this time. Plan for the OR this afternoon  -Plan:   • Stable for the OR this afternoon  • Plan for next dialysis on Saturday     Polymicrobial bacteremia  --Enterobacter/Proteus/Streptococcus/Granulicatella  -- Permacath removed due to bacteremia but appears that the primary source was right diabetic foot ulceration with septic arthropathy and osteomyelitis  -- Status post angiogram showing underlying osteomyelitis. Plan for right partial first ray resection and may require possible wound VAC plan for procedure this afternoon     Hypertension  -- Continue carvedilol and nifedipine     Mineral bone disorder-chronic kidney disease  -- Sevelamer with meals     Anemia of chronic kidney disease  -- Stable    Review of Systems: The entire 12 point ROS has been reviewed.     Physical Exam:    General: Awake, no acute distress  Skin: No new rashes  CVS: S1-S2 appreciated regular rate and rhythm  Lungs: Clear to auscultation  Abdomen: Nontender nondistended  Access: Permacath in place no erythema or exudate  Extremities: No significant edema  Neuro: No asterixis          Current Facility-Administered Medications:   •  acetaminophen (TYLENOL) tablet 650 mg, 650 mg, Oral, Q6H PRN, Cathy Lagunas MD, 650 mg at 08/20/23 1141  •  acetaminophen (TYLENOL) tablet 975 mg, 975 mg, Oral, Q8H, Joel Dalton MD, 975 mg at 08/28/23 1208  •  [START ON 9/1/2023] atorvastatin (LIPITOR) tablet 80 mg, 80 mg, Oral, Daily, Freddy Carrasquillo DO  •  carvedilol (COREG) tablet 12.5 mg, 12.5 mg, Oral, BID, Cathy Lagunas MD, 12.5 mg at 08/30/23 1705  •  cefepime (MAXIPIME) IVPB (premix in dextrose) 1,000 mg 50 mL, 1,000 mg, Intravenous, Q24H, Jaren Méndez DO, Last Rate: 100 mL/hr at 08/30/23 1952, 1,000 mg at 08/30/23 1952  •  famotidine (PEPCID) tablet 10 mg, 10 mg, Oral, Daily, Cathy Lagunas MD, 10 mg at 08/30/23 0900  •  gabapentin (NEURONTIN) oral solution 200 mg, 200 mg, Oral, BID, Jaren Méndez DO, 200 mg at 08/31/23 7407  •  haloperidol lactate (HALDOL) injection 2 mg, 2 mg, Intramuscular, Q6H PRN, Jaren Méndez DO, 2 mg at 08/23/23 1441  •  heparin (porcine) subcutaneous injection 5,000 Units, 5,000 Units, Subcutaneous, Q8H [address], Cathy Lagunas MD, 5,000 Units at 08/31/23 0547  •  HYDROmorphone (DILAUDID) injection 1 mg, 1 mg, Intravenous, Q4H PRN, Cathy Lagunas MD, 1 mg at 08/25/23 2337  •  insulin glargine (LANTUS) subcutaneous injection 15 Units 0.15 mL, 15 Units, Subcutaneous, HS, Jaren Méndez DO, 15 Units at 08/30/23 2155  •  insulin lispro (HumaLOG) 100 units/mL subcutaneous injection 1-5 Units, 1-5 Units, Subcutaneous, HS, Blanco Dalton MD, 1 Units at 08/30/23 2154  •  insulin lispro (HumaLOG) 100 units/mL subcutaneous injection 2-12 Units, 2-12 Units, Subcutaneous, TITOPHER AC, 2 Units at 08/26/23 1556 **AND** Fingerstick Glucose (POCT), , , TID AC, Joel Dalton MD  •  insulin lispro (HumaLOG) 100 units/mL subcutaneous injection 5 Units, 5 Units, Subcutaneous, TID With Meals, Jaren Méndez DO, 5 Units at 08/30/23 1703  •  lidocaine (LIDODERM) 5 % patch 1 patch, 1 patch, Topical, Daily, Aleks Lazcano MD, 1 patch at 08/25/23 0834  •  LORazepam (ATIVAN) injection 0.5 mg, 0.5 mg, Intravenous, Q6H PRN, Aleks Lazcano MD, 0.5 mg at 08/31/23 0208  •  methocarbamol (ROBAXIN) tablet 500 mg, 500 mg, Oral, Q12H PRN, Aleks Lazcano MD, 500 mg at 08/25/23 1659  •  metroNIDAZOLE (FLAGYL) tablet 500 mg, 500 mg, Oral, Q8H [address], Jaren Méndez DO, 500 mg at 08/31/23 0200  •  NIFEdipine (PROCARDIA XL) 24 hr tablet 30 mg, 30 mg, Oral, BID, Joel Dalton MD, 30 mg at 08/30/23 1705  •  ondansetron (ZOFRAN) injection 4 mg, 4 mg, Intravenous, Q6H PRN, Diego Dalton MD  •  oxyCODONE (ROXICODONE) IR tablet 5 mg, 5 mg, Oral, Q8H PRN, Aleks Lazcano MD, 5 mg at 08/25/23 2221  •  senna-docusate sodium (SENOKOT S) 8.6-50 mg per tablet 2 tablet, 2 tablet, Oral, BID, Jaren Méndez DO, 2 tablet at 08/30/23 1705  •  sevelamer (RENAGEL) tablet 800 mg, 800 mg, Oral, TID With Meals, Aleks Lazcano MD, 800 mg at 08/30/23 1703  •  vancomycin (VANCOCIN) IVPB (premix in dextrose) 1,000 mg 200 mL, 10 mg/kg (Adjusted), Intravenous, Once per day on Tue Thu Sat, Kae Hylton PA-C

## 2023-08-31 NOTE — CASE MANAGEMENT
Case Management Discharge Planning Note    Patient name Андрей Albrecht  Location [address] [facility] [address]-* MRN [id number]  : 1960 Date 2023       Current Admission Date: 2023  Current Admission Diagnosis:Polymicrobial bacterial infection   Patient Active Problem List    Diagnosis Date Noted   • Cellulitis of right foot    • Polymicrobial bacterial infection 2023   • 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 mellitus, with necrosis of bone ([address])    • Acquired deformity of foot, right    • Charcot's joint of right foot    • Subacute osteomyelitis of right foot ([address])    • Open wound of right foot 2023   • 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 mellitus, with bone involvement without evidence of necrosis ([address])    • Diabetic ulcer of left midfoot associated with type 2 diabetes mellitus, limited to breakdown of skin ([address])    • Diabetic polyneuropathy associated with type 2 diabetes mellitus ([address])    • Diabetes mellitus type 2 with peripheral artery disease ([address])    • History of amputation of lesser toe of left foot ([address])    • Onychomycosis    • Status post fall 2023   • Traumatic rhabdomyolysis ([address]) 2023   • Leukocytosis 2023   • Electrolyte abnormality 2023   • Osteoarthritis of left hip 2022   • Severe Left Orchalgia 2022   • Right shoulder pain 2022   • Left hip pain 2022   • Hemodialysis status ([address])    • Hypervolemia    • Anemia 2022   • ESRD (end stage renal disease) ([address]) 2022   • Type 2 diabetes mellitus, without long-term current use of insulin ([address])    • Hypertension    • History of TIA (transient ischemic attack)    • T10 vertebral fracture (HCC)       LOS (days): 12  Geometric Mean LOS (GMLOS) (days): 4.70  Days to GMLOS:-7.4     OBJECTIVE:  Risk of Unplanned Readmission Score: 32.4     Current admission status: Inpatient   Preferred Pharmacy:    [facility] [place], [place] - [address] [facility]  Phone: [phone] Fax: [phone]    Primary Care Provider: Susanna Lott MD    Primary Insurance: MEDICARE  Secondary Insurance:     DISCHARGE DETAILS:    Other Referral/Resources/Interventions Provided:  Interventions: Short Term Rehab  Referral Comments: Clinical update including podiatry and ID progress notes sent to [address] [place] [address] at [facility] to prepare for eventual transfer. Update included need for wound vac. SW will continue to follow to monitor progress and assist with transfer when ready.     Treatment Team Recommendation: Short Term Rehab  Discharge Destination Plan[de-identified] Short Term Rehab  Transport at Discharge :  (pending)

## 2023-08-31 NOTE — PROGRESS NOTES
Podiatry - Progress Note  Patient: Yamilex Chang 58 y.o. male   MRN: [id number]  PCP: Andrzej Rodriguez MD  Unit/Bed#: 913 [facility] 421-01 Encounter: [id number]  Date Of Visit: 23    ASSESSMENT:    Yamilex Chang is a 58 y.o. male with:    1. Right submet 1 ulceration probing to bone with underlying osteomyelitis - [place] 3  2. Right 1st MTPJ septic arthritis  3. T2DM with peripheral neuropathy  4. ESRD    PLAN:    · Will plan for R partial 1st ray resection with possible wound VAC application tomorrow . · Patient understands he is at extremely high risk for wound nonhealing given ESRD, T2DM, PAD  · Reviewed a-gram from 23: Three vessel runoff to R foot  · Continue local wound care, appreciate nursing assistance with dressing changes. · Elevation and offloading on green foam wedges or pillows when non-ambulatory. · Rest of care per primary team.     Weightbearing status: Weightbearing as tolerated    SUBJECTIVE:     The patient was seen, evaluated, and assessed at bedside today. The patient was awake, alert, and in no acute distress. No acute events overnight. The patient reports no pain to his right foot. Patient denies N/V/F/chills/SOB/CP. OBJECTIVE:     Vitals:   /65   Pulse 69   Temp 98.3 °F (36.8 °C)   Resp 19   Ht 6' 1" (1.854 m)   Wt 115 kg (253 lb)   SpO2 91%   BMI 33.38 kg/m²     Temp (24hrs), Av °F (36.7 °C), Min:97.7 °F (36.5 °C), Max:98.3 °F (36.8 °C)      Physical Exam:     General: Alert, cooperative and no distress  Lungs: Non labored breathing  Abdomen: Soft, non-tender. Lower extremity exam:  Pedal pulses palpable. Light touch is absent. Pain on palpation negative. No calf tenderness noted.      Lower extremity wound(s) as noted below:  Wound #: 1  Location: right submet 1  Length 3.0cm: Width 1.8cm: Depth 4.0cm:   Deepest Tissue Noted in Base: bone  Probe to Bone: Yes  Peripheral Skin Description: Attached and hyperkeratotic  Granulation: 60% Fibrotic Tissue: 40% Necrotic Tissue: 0%   Drainage Amount: minimal, serosanguinous  Signs of Infection: Yes      Additional Data:     Labs:    Results from last 7 days   Lab Units 08/30/23  0553   WBC Thousand/uL 14.70*   HEMOGLOBIN g/dL 9.3*   HEMATOCRIT % 30.0*   PLATELETS Thousands/uL 302     Results from last 7 days   Lab Units 08/30/23  0553   POTASSIUM mmol/L 4.2   CHLORIDE mmol/L 100   CO2 mmol/L 25   BUN mg/dL 38*   CREATININE mg/dL 6.49*   CALCIUM mg/dL 8.2*           * I Have Reviewed All Lab Data Listed Above. Recent Cultures (last 7 days):     Results from last 7 days   Lab Units 08/24/23  1748 08/24/23  0618   BLOOD CULTURE   --  No Growth After 5 Days. No Growth After 5 Days. GRAM STAIN RESULT  Rare Polys  No bacteria seen  1+ Polys*  1+ Gram positive cocci in pairs*  --    WOUND CULTURE  1+ Growth of Proteus vulgaris group*  1+ Growth of Enterobacter cloacae*  1+ Growth of Beta Hemolytic Streptococcus Not Group A, B, C, F or G*  --            Imaging: I have personally reviewed pertinent films in PACS  EKG, Pathology, and Other Studies: I have personally reviewed pertinent reports. ** Please Note: Portions of the record may have been created with voice recognition software. Occasional wrong word or "sound a like" substitutions may have occurred due to the inherent limitations of voice recognition software. Read the chart carefully and recognize, using context, where substitutions have occurred.  **

## 2023-09-01 ENCOUNTER — APPOINTMENT (INPATIENT)
Dept: RADIOLOGY | Facility: HOSPITAL | Age: 63
DRG: 474 | End: 2023-09-01
Payer: MEDICARE

## 2023-09-01 LAB
ANION GAP SERPL CALCULATED.3IONS-SCNC: 10 MMOL/L
BUN SERPL-MCNC: 31 MG/DL (ref 5–25)
CALCIUM SERPL-MCNC: 8.2 MG/DL (ref 8.4–10.2)
CHLORIDE SERPL-SCNC: 98 MMOL/L (ref 96–108)
CO2 SERPL-SCNC: 25 MMOL/L (ref 21–32)
CREAT SERPL-MCNC: 5.85 MG/DL (ref 0.6–1.3)
ERYTHROCYTE [DISTWIDTH] IN BLOOD BY AUTOMATED COUNT: 17.2 % (ref 11.6–15.1)
GFR SERPL CREATININE-BSD FRML MDRD: 9 ML/MIN/1.73SQ M
GLUCOSE SERPL-MCNC: 138 MG/DL (ref 65–140)
GLUCOSE SERPL-MCNC: 151 MG/DL (ref 65–140)
GLUCOSE SERPL-MCNC: 164 MG/DL (ref 65–140)
GLUCOSE SERPL-MCNC: 169 MG/DL (ref 65–140)
GLUCOSE SERPL-MCNC: 190 MG/DL (ref 65–140)
HCT VFR BLD AUTO: 30.1 % (ref 36.5–49.3)
HGB BLD-MCNC: 9.3 G/DL (ref 12–17)
MCH RBC QN AUTO: 28.5 PG (ref 26.8–34.3)
MCHC RBC AUTO-ENTMCNC: 30.9 G/DL (ref 31.4–37.4)
MCV RBC AUTO: 92 FL (ref 82–98)
PLATELET # BLD AUTO: 297 THOUSANDS/UL (ref 149–390)
PMV BLD AUTO: 9.4 FL (ref 8.9–12.7)
POTASSIUM SERPL-SCNC: 5 MMOL/L (ref 3.5–5.3)
RBC # BLD AUTO: 3.26 MILLION/UL (ref 3.88–5.62)
SODIUM SERPL-SCNC: 133 MMOL/L (ref 135–147)
WBC # BLD AUTO: 14.32 THOUSAND/UL (ref 4.31–10.16)

## 2023-09-01 PROCEDURE — 93922 UPR/L XTREMITY ART 2 LEVELS: CPT | Performed by: SURGERY

## 2023-09-01 PROCEDURE — 97168 OT RE-EVAL EST PLAN CARE: CPT

## 2023-09-01 PROCEDURE — 85027 COMPLETE CBC AUTOMATED: CPT | Performed by: NURSE PRACTITIONER

## 2023-09-01 PROCEDURE — 97164 PT RE-EVAL EST PLAN CARE: CPT

## 2023-09-01 PROCEDURE — 97535 SELF CARE MNGMENT TRAINING: CPT

## 2023-09-01 PROCEDURE — 99232 SBSQ HOSP IP/OBS MODERATE 35: CPT | Performed by: INTERNAL MEDICINE

## 2023-09-01 PROCEDURE — 73630 X-RAY EXAM OF FOOT: CPT

## 2023-09-01 PROCEDURE — 93926 LOWER EXTREMITY STUDY: CPT | Performed by: SURGERY

## 2023-09-01 PROCEDURE — 99232 SBSQ HOSP IP/OBS MODERATE 35: CPT

## 2023-09-01 PROCEDURE — 82948 REAGENT STRIP/BLOOD GLUCOSE: CPT

## 2023-09-01 PROCEDURE — 80048 BASIC METABOLIC PNL TOTAL CA: CPT | Performed by: NURSE PRACTITIONER

## 2023-09-01 RX ADMIN — CARVEDILOL 12.5 MG: 12.5 TABLET, FILM COATED ORAL at 08:39

## 2023-09-01 RX ADMIN — HEPARIN SODIUM 5000 UNITS: 5000 INJECTION INTRAVENOUS; SUBCUTANEOUS at 22:41

## 2023-09-01 RX ADMIN — INSULIN LISPRO 5 UNITS: 100 INJECTION, SOLUTION INTRAVENOUS; SUBCUTANEOUS at 12:48

## 2023-09-01 RX ADMIN — SEVELAMER HYDROCHLORIDE 800 MG: 800 TABLET ORAL at 17:38

## 2023-09-01 RX ADMIN — GABAPENTIN 200 MG: 250 SUSPENSION ORAL at 17:38

## 2023-09-01 RX ADMIN — ATORVASTATIN CALCIUM 80 MG: 80 TABLET, FILM COATED ORAL at 17:33

## 2023-09-01 RX ADMIN — INSULIN LISPRO 5 UNITS: 100 INJECTION, SOLUTION INTRAVENOUS; SUBCUTANEOUS at 17:35

## 2023-09-01 RX ADMIN — SENNOSIDES AND DOCUSATE SODIUM 2 TABLET: 50; 8.6 TABLET ORAL at 17:34

## 2023-09-01 RX ADMIN — GABAPENTIN 200 MG: 250 SUSPENSION ORAL at 09:34

## 2023-09-01 RX ADMIN — CARVEDILOL 12.5 MG: 12.5 TABLET, FILM COATED ORAL at 17:34

## 2023-09-01 RX ADMIN — ACETAMINOPHEN 975 MG: 325 TABLET ORAL at 05:20

## 2023-09-01 RX ADMIN — INSULIN LISPRO 5 UNITS: 100 INJECTION, SOLUTION INTRAVENOUS; SUBCUTANEOUS at 08:38

## 2023-09-01 RX ADMIN — INSULIN GLARGINE 15 UNITS: 100 INJECTION, SOLUTION SUBCUTANEOUS at 22:41

## 2023-09-01 RX ADMIN — SEVELAMER HYDROCHLORIDE 800 MG: 800 TABLET ORAL at 08:39

## 2023-09-01 RX ADMIN — INSULIN LISPRO 2 UNITS: 100 INJECTION, SOLUTION INTRAVENOUS; SUBCUTANEOUS at 08:37

## 2023-09-01 RX ADMIN — INSULIN LISPRO 2 UNITS: 100 INJECTION, SOLUTION INTRAVENOUS; SUBCUTANEOUS at 12:48

## 2023-09-01 RX ADMIN — FAMOTIDINE 10 MG: 20 TABLET, FILM COATED ORAL at 08:39

## 2023-09-01 RX ADMIN — SEVELAMER HYDROCHLORIDE 800 MG: 800 TABLET ORAL at 12:49

## 2023-09-01 RX ADMIN — HEPARIN SODIUM 5000 UNITS: 5000 INJECTION INTRAVENOUS; SUBCUTANEOUS at 15:21

## 2023-09-01 RX ADMIN — NIFEDIPINE 30 MG: 30 TABLET, EXTENDED RELEASE ORAL at 17:38

## 2023-09-01 RX ADMIN — METRONIDAZOLE 500 MG: 500 TABLET ORAL at 05:21

## 2023-09-01 RX ADMIN — HEPARIN SODIUM 5000 UNITS: 5000 INJECTION INTRAVENOUS; SUBCUTANEOUS at 05:23

## 2023-09-01 RX ADMIN — SENNOSIDES AND DOCUSATE SODIUM 2 TABLET: 50; 8.6 TABLET ORAL at 08:47

## 2023-09-01 RX ADMIN — INSULIN LISPRO 1 UNITS: 100 INJECTION, SOLUTION INTRAVENOUS; SUBCUTANEOUS at 22:41

## 2023-09-01 RX ADMIN — NIFEDIPINE 30 MG: 30 TABLET, EXTENDED RELEASE ORAL at 08:39

## 2023-09-01 NOTE — PHYSICAL THERAPY NOTE
PT RE-EVALUATION     09/01/23 1022   PT Last Visit   PT Visit Date 09/01/23   Note Type   Note type Re-Evaluation   Pain Assessment   Pain Assessment Tool 0-10   Pain Score No Pain   Patient's Stated Pain Goal No pain   Multiple Pain Sites No   Restrictions/Precautions   Weight Bearing Precautions Per Order Yes   RLE Weight Bearing Per Order (S)  NWB  (s/p R 1st ray partial amputation on 8/31/23 now NWB)   Other Precautions Bed Alarm; Chair Alarm; Fall Risk;Pain;WBS   Home Living   Type of Home House   Prior Function   Level of [place] Independent with ADLs; Independent with functional mobility; Independent with IADLS   Lives With (S)  Alone   General   Additional Pertinent History Pt admitted to the hospital on 8/19/23 now POD #1 s/p R 1st ray partial amputation now NWB RLE. Family/Caregiver Present No   Cognition   Overall Cognitive Status WFL   Arousal/Participation Cooperative   Orientation Level Oriented X4   Following Commands Follows all commands and directions without difficulty   Subjective   Subjective "I can't put weight on it"   RLE Assessment   RLE Assessment WFL  (Ankle not assessed, presents with ACE bandage + wound vac)   LLE Assessment   LLE Assessment WFL   Bed Mobility   Sit to Supine 5  Supervision   Additional items Verbal cues; Bedrails; Increased time required   Transfers   Sit to Stand 2  Maximal assistance   Additional items Assist x 1;Verbal cues;HOB elevated; Increased time required   Stand to Sit 4  Minimal assistance   Additional items Assist x 1;Verbal cues; Increased time required; Impulsive  (Sary Andrzej returns to sitting, not following cues for proper hand placement)   Additional Comments Attempted STS from elevated HOB - pt unable to maintain NWB during stand depsite verbal/tactile cues - pt then attempted to hop but unable to perform safely and cued to return to sitting   Ambulation/Elevation   Gait Assistance Not tested  (Unable due to weakness, difficulty maintaining NWB status RLE)   Balance   Static Sitting Normal   Dynamic Sitting Good   Static Standing Fair -   Dynamic Standing Poor   Activity Tolerance   Activity Tolerance Patient limited by fatigue; Other (Comment)  (Limited by WBS)   Nurse Made Aware yes RN Gen   Assessment   Prognosis Good   Problem List Decreased strength;Decreased range of motion;Decreased endurance;Decreased mobility; Impaired balance;Decreased safety awareness;Decreased skin integrity;Orthopedic restrictions;Pain   Assessment Pt agreeable to PT session this AM. Recieved sitting at EOB with RLE in dependent position, wound vac on. Attempted STS from elevated HOB but pt presents with difficulty maintaining NWB status to RLE despite verbal/tactile cues. Once standing able to maintain NWB with stand by assist but unable to ambulate due to difficulty hopping/unable to maintain NWB RLE. Able to perform exercise as mentioned below with good response. The patient's AM-PAC Basic Mobility Inpatient Short Form Raw Score is 13. A Raw score of less than or equal to 16 suggests the patient may benefit from discharge to post-acute rehabilitation services. Please also refer to the recommendation of the Physical Therapist for safe discharge planning. Goals   Patient Goals to go to rehab and get better   STG Expiration Date 09/08/23   Short Term Goal #1 Pt will perform bed mobility IND ; Pt will perform STS from EOB with Min A using RW ; Standing balance will improve to Fair- to decrease risk of falls ; Pt will ambulate x 25 feet with Min A + RW ; Pt will safely maintain NWB status % of the time ; AMPAC score will improve >16/24 to demonstrate improved functional independence   LTG Expiration Date 09/15/23   Long Term Goal #1 Pt will perform STS from EOB with supervision using RW ; Standing balance will improve to Fair to decrease risk of falls ;  Pt will ambulate x 50 feet with supervision + RW ; AMPAC score will improve >18/24 to demonstrate improved functional independence   Plan   Treatment/Interventions ADL retraining;Functional transfer training;LE strengthening/ROM; Elevations; Therapeutic exercise; Endurance training;Bed mobility;Gait training;Spoke to nursing;OT   PT Frequency Other (Comment)  (5x/week)   Recommendation   PT Discharge Recommendation Post acute rehabilitation services  (pt now s/p R foot surgery, NWB RLE and will require STR)   AM-PAC Basic Mobility Inpatient   Turning in Flat Bed Without Bedrails 4   Lying on Back to Sitting on Edge of Flat Bed Without Bedrails 3   Moving Bed to Chair 2   Standing Up From Chair Using Arms 2   Walk in Room 1   Climb 3-5 Stairs With Railing 1   Basic Mobility Inpatient Raw Score 13   Basic Mobility Standardized Score 33.99   Highest Level Of Mobility   -[facility] Goal 4: Move to chair/commode   -HLM Achieved 5: Stand (1 or more minutes)   Barthel Index   Feeding 10   Bathing 0   Grooming Score 5   Dressing Score 5   Bladder Score 10   Bowels Score 10   Toilet Use Score 5   Transfers (Bed/Chair) Score 5   Mobility (Level Surface) Score 0   Stairs Score 0   Barthel Index Score 50   Exercises   Neuro re-ed Able to perform seated exercise including ankle pumps, LAQ, seated hip marches, hip add squeezes   End of Consult   Patient Position at End of Consult Seated edge of bed; All needs within reach   [facility] Insurance Number  [place] [id number]

## 2023-09-01 NOTE — ASSESSMENT & PLAN NOTE
Lab Results   Component Value Date    EGFR 9 09/01/2023    EGFR 6 08/31/2023    EGFR 8 08/30/2023    CREATININE 5.85 (H) 09/01/2023    CREATININE 7.76 (H) 08/31/2023    CREATININE 6.49 (H) 08/30/2023   · Patient is on Tuesday Thursday Saturday schedule at [address]  · IJ  permcath removed, temporary  catheter placed 8/25  · Nephrology following  · Patient cleared by ID for permacath placement which he underwent on 8/30  · Hemodialysis on 8/31; dialysis nurse reported some difficulty with occlusion, administered Cathflo successfully and patient was able to complete his dialysis session without difficulties.

## 2023-09-01 NOTE — PROGRESS NOTES
NEPHROLOGY PROGRESS NOTE   Fabrice Pacheco 58 y.o. male MRN: [id number]  Unit/Bed#: [address] [place] [address] Encounter: [id number]  Reason for Consult: ESRD      SUMMARY:    57 yo man with PMH of ESRD on HD TTS p/w AMS.   Nephrology is consulted for management of ESRD                ASSESSMENT and PLAN:     End Stage Kidney Disease  -Modality:In-Center Hemodialysis  -Schedule: Tuesday/Thursday/Saturday  -Dialysis [facility]  -Access: New permacath placed on August 30  -Presciption: Reviewed  -Status: Underwent hemodialysis yesterday  -Plan:   • Patient status post partial first ray resection yesterday with a wound VAC in place. May go back to the OR for reevaluation and possible closure  • Plan for hemodialysis tomorrow we will need to time it with any type of procedures for tomorrow     Polymicrobial bacteremia  --Enterobacter/Proteus/Streptococcus/Granulicatella  -- Permacath removed due to bacteremia but appears that the primary source was right diabetic foot ulceration with septic arthropathy and osteomyelitis. New permacath in place. -- Status post angiogram showing underlying osteomyelitis. Status post right partial first ray resection on August 31st, with a wound VAC in place     Hypertension  -- Continue carvedilol and nifedipine     Mineral bone disorder-chronic kidney disease  -- Sevelamer with meals     Anemia of chronic kidney disease  -- Stable    SUBJECTIVE / INTERVAL HISTORY:    No pain. Underwent surgery yesterday. Currently has a wound VAC in place.     OBJECTIVE:  Current Weight: Weight - Scale: 115 kg (253 lb)  Vitals:    08/31/23 1856 08/31/23 1932 08/31/23 2249 09/01/23 0747   BP: 140/62 144/73 125/59 144/59   BP Location:       Pulse: 68 69 74 70   Resp: 20  18 18   Temp:  97.8 °F (36.6 °C) 97.5 °F (36.4 °C) (!) 97.4 °F (36.3 °C)   TempSrc:       SpO2: 97% 90% 93% 92%   Weight:       Height:           Intake/Output Summary (Last 24 hours) at 9/1/2023 0859  Last data filed at 8/31/2023 1601  Gross per 24 hour   Intake 310 ml   Output 3436 ml   Net -3126 ml       Review of Systems:    Constitutional: Negative for chills and fever. HENT: Negative for ear pain and sore throat. Eyes: Negative for pain and visual disturbance. Respiratory: Negative for cough and shortness of breath. Cardiovascular: Negative for chest pain and palpitations. Gastrointestinal: Negative for abdominal pain and vomiting. Genitourinary: Negative for dysuria and hematuria. Musculoskeletal: Negative for arthralgias and back pain. Skin: Negative for color change and rash. Neurological: Negative for seizures and syncope. 12 point ROS has been reviewed. Physical Exam  Vitals and nursing note reviewed. Constitutional:       General: He is not in acute distress. Appearance: He is well-developed. He is obese. HENT:      Head: Normocephalic and atraumatic. Eyes:      General: No scleral icterus. Conjunctiva/sclera: Conjunctivae normal.      Pupils: Pupils are equal, round, and reactive to light. Cardiovascular:      Rate and Rhythm: Normal rate and regular rhythm. Heart sounds: S1 normal and S2 normal. No murmur heard. No friction rub. No gallop. Pulmonary:      Effort: Pulmonary effort is normal. No respiratory distress. Breath sounds: Normal breath sounds. No wheezing or rales. Abdominal:      General: Bowel sounds are normal.      Palpations: Abdomen is soft. Tenderness: There is no abdominal tenderness. There is no rebound. Musculoskeletal:         General: Normal range of motion. Cervical back: Normal range of motion and neck supple. Skin:     Findings: No rash. Neurological:      Mental Status: He is alert and oriented to person, place, and time.    Psychiatric:         Behavior: Behavior normal.         Medications:    Current Facility-Administered Medications:   •  acetaminophen (TYLENOL) tablet 650 mg, 650 mg, Oral, Q6H PRN, Janene Burris DPM, 650 mg at 08/20/23 1141  •  acetaminophen (TYLENOL) tablet 975 mg, 975 mg, Oral, Q8H, Won Parmar DPM, 975 mg at 09/01/23 1617  •  atorvastatin (LIPITOR) tablet 80 mg, 80 mg, Oral, Daily, Won Parmar DPM  •  carvedilol (COREG) tablet 12.5 mg, 12.5 mg, Oral, BID, Won Parmar DPM, 12.5 mg at 09/01/23 8778  •  cefepime (MAXIPIME) IVPB (premix in dextrose) 1,000 mg 50 mL, 1,000 mg, Intravenous, Q24H, Won Parmar DPTRICE, Last Rate: 100 mL/hr at 08/31/23 2100, 1,000 mg at 08/31/23 2100  •  famotidine (PEPCID) tablet 10 mg, 10 mg, Oral, Daily, Randy Mitchell, DPM, 10 mg at 09/01/23 1378  •  gabapentin (NEURONTIN) oral solution 200 mg, 200 mg, Oral, BID, Randy Mitchell, DPM, 200 mg at 08/31/23 2208  •  haloperidol lactate (HALDOL) injection 2 mg, 2 mg, Intramuscular, Q6H PRN, Randy Mitchell DPM, 2 mg at 08/23/23 1441  •  heparin (porcine) subcutaneous injection 5,000 Units, 5,000 Units, Subcutaneous, Q8H [address], Randy Mitchell, DPM, 5,000 Units at 09/01/23 1853  •  HYDROmorphone (DILAUDID) injection 1 mg, 1 mg, Intravenous, Q4H PRN, Randy Mitchell DPM, 1 mg at 08/25/23 2337  •  insulin glargine (LANTUS) subcutaneous injection 15 Units 0.15 mL, 15 Units, Subcutaneous, HS, Won Parmar DPM, 15 Units at 08/31/23 2108  •  insulin lispro (HumaLOG) 100 units/mL subcutaneous injection 1-5 Units, 1-5 Units, Subcutaneous, HS, Randy Mitchell, DPM, 1 Units at 08/31/23 2108  •  insulin lispro (HumaLOG) 100 units/mL subcutaneous injection 2-12 Units, 2-12 Units, Subcutaneous, TID AC, 2 Units at 09/01/23 0837 **AND** Fingerstick Glucose (POCT), , , TID Won CARTER DPM  •  insulin lispro (HumaLOG) 100 units/mL subcutaneous injection 5 Units, 5 Units, Subcutaneous, TID With Meals, Randy Mitchell DPM, 5 Units at 09/01/23 0838  •  lidocaine (LIDODERM) 5 % patch 1 patch, 1 patch, Topical, Daily, Randy Mitchell DPM, 1 patch at 08/25/23 5672  •  LORazepam (ATIVAN) injection 0.5 mg, 0.5 mg, Intravenous, Q6H PRN, Randy Mitchell DPM, 0.5 mg at 08/31/23 0208  •  methocarbamol (ROBAXIN) tablet 500 mg, 500 mg, Oral, Q12H PRN, Berton Adjutant, DPM, 500 mg at 08/25/23 1659  •  metroNIDAZOLE (FLAGYL) tablet 500 mg, 500 mg, Oral, Q8H [address], [place], DPM, 500 mg at 09/01/23 2772  •  NIFEdipine (PROCARDIA XL) 24 hr tablet 30 mg, 30 mg, Oral, BID, [facility], DPM, 30 mg at 09/01/23 0839  •  ondansetron (ZOFRAN) injection 4 mg, 4 mg, Intravenous, Q6H PRN, Berton Adjutant, DPM  •  oxyCODONE (ROXICODONE) IR tablet 5 mg, 5 mg, Oral, Q8H PRN, Berton Adjutant, DPM, 5 mg at 08/25/23 2221  •  senna-docusate sodium (SENOKOT S) 8.6-50 mg per tablet 2 tablet, 2 tablet, Oral, BID, Berton Adjutant, DPM, 2 tablet at 09/01/23 8176  •  sevelamer (RENAGEL) tablet 800 mg, 800 mg, Oral, TID With Meals, Berton Adjutant, DPM, 800 mg at 09/01/23 6452  •  vancomycin (VANCOCIN) IVPB (premix in dextrose) 1,000 mg 200 mL, 10 mg/kg (Adjusted), Intravenous, Once per day on Tue Thu Sat, [facility], DPM, Last Rate: 200 mL/hr at 08/31/23 2352, 1,000 mg at 08/31/23 2352    Laboratory Results:  Results from last 7 days   Lab Units 09/01/23  0522 08/31/23  0450 08/30/23  0553 08/29/23  0915 08/29/23  0501 08/27/23  0455 08/26/23  0855   WBC Thousand/uL 14.32* 13.20* 14.70*  --  14.91* 10.02 10.55*   HEMOGLOBIN g/dL 9.3* 9.7* 9.3*  --  11.5* 9.9* 10.3*   HEMATOCRIT % 30.1* 31.1* 30.0*  --  36.8 31.4* 31.9*   PLATELETS Thousands/uL 297 302 302  --  386 290 277   POTASSIUM mmol/L 5.0 4.5 4.2 4.0 6.2* 3.8 3.8   CHLORIDE mmol/L 98 101 100  --  95* 95* 93*   CO2 mmol/L 25 25 25  --  22 27 27   BUN mg/dL 31* 47* 38*  --  57* 36* 40*   CREATININE mg/dL 5.85* 7.76* 6.49*  --  7.97* 5.84* 6.71*   CALCIUM mg/dL 8.2* 8.3* 8.2*  --  8.9 7.8* 8.0*   MAGNESIUM mg/dL  --  2.2  --   --   --  2.2 2.3   PHOSPHORUS mg/dL  --   --   --   --   --  2.7 2.5       PLEASE NOTE:  This encounter was completed utilizing the [employer] Direct Speech Voice Recognition Software.  Grammatical errors, random word insertions, pronoun errors and incomplete sentences are occasional consequences of the system due to software limitations, ambient noise and hardware issues. These may be missed by proof reading prior to affixing electronic signature. Any questions or concerns about the content, text or information contained within the body of this dictation should be directly addressed to the physician for clarification. Please do not hesitate to call me directly if you have any any questions or concerns.

## 2023-09-01 NOTE — OCCUPATIONAL THERAPY NOTE
Occupational Therapy Evaluation     Patient Name: Sergey Valdez  Today's Date: 9/1/2023  Problem List  Principal Problem:    Polymicrobial bacterial infection  Active Problems:    ESRD (end stage renal disease) ([address])    Type 2 diabetes mellitus, without long-term current use of insulin ([address])    Hypertension    Status post fall    Diabetic ulcer of left midfoot associated with type 2 diabetes mellitus, limited to breakdown of skin (HCC)    Acquired deformity of foot, right    Charcot's joint of right foot    Cellulitis of right foot    Past Medical History  Past Medical History:   Diagnosis Date    CKD     Diabetes mellitus (HCC)     Hyperlipidemia     Hypertension     Left toe amputee ([address])     TIA (transient ischemic attack)      Past Surgical History  Past Surgical History:   Procedure Laterality Date    AMPUTATION Left     left foot resection 10 years ago dr Denzel Boyce    IR [address]  08/29/2023    IR TEMPORARY DIALYSIS CATHETER PLACEMENT  08/25/2023    IR TUNNELED CENTRAL LINE REMOVAL  08/23/2023    IR TUNNELED DIALYSIS CATHETER PLACEMENT  01/27/2022    IR TUNNELED DIALYSIS CATHETER PLACEMENT  08/30/2023    [place] AMPUTATION METATARSAL W/TOE SINGLE Right 8/31/2023    Procedure: RIGHT PARTIAL 1ST RAY RESECTION WITH  WOUND VAC APPLICATION;  Surgeon: Andreina Ibarra DPM;  Location: [facility];  Service: Podiatry        09/01/23 1351   OT Last Visit   OT Visit Date 09/01/23  (Friday)   Note Type   Note type Re-Evaluation   Pain Assessment   Pain Assessment Tool 0-10   Pain Score No Pain   Restrictions/Precautions   Weight Bearing Precautions Per Order Yes   RLE Weight Bearing Per Order (S)  NWB  (s/p R 1st ray partial amputation on 8/31/23)   Braces or Orthoses   (wound vac R foot)   Other Precautions Contact/isolation; Chair Alarm; Bed Alarm;WBS;Multiple lines; Fall Risk  (Wound Vac R foot)   Home Living   Type of [address] [place] [address]  Multi-level;Performs ADLs on one level; Able to live on main level with bedroom/bathroom; Other (Comment)  ([address] to back door)   Bathroom Shower/Tub Tub/shower unit  (pt reports sponge bathing at baseline due to HD port)   [address];Cane;Other (Comment)  (does not use AD at baseline)   Additional Comments Pt reports he has been staying / living w/ his sister in [address] [place] [address]   Prior Function   Level of [address] with ADLs; Independent with functional mobility; Independent with IADLS  (+ drive to /from HD)   Lives With Other (Comment)  (pt reports living / staying w/ his sister but added that she is not home much. She is taking care of her grandkids)   Receives Help From Family   IADLs Independent with driving; Independent with meal prep; Independent with medication management   Falls in the last 6 months 1 to 4   Vocational On disability   Comments Pt seen for OT re-eval s/p R 1st ray amputation on 8/31/23 and is NWB R LE w/ wound vac per podiatry. Please see OT eval (08/24/23) for detailed PMH and social history   Lifestyle   Autonomy Pt reports I w/ ADLs at baseline w/ out use of AD or DME   Reciprocal Relationships Pt reports living alone / with his sister   Service to Others Pt reports retired / on disability and worked as a    Intrinsic Gratification Pt reports enjoying his grandchildren, 2 daughters, and fishing / cooking   General   Additional Pertinent History s/p R 1st ray partial amputation on 8/31/23. Pt is NWB R LE   Family/Caregiver Present No   Subjective   Subjective "My ex-wife and daughter are going to get some clothes from my house for rehab"   ADL   Eating Assistance 7  Independent   Eating Deficit Setup   Grooming Assistance 6  Modified Independent   Grooming Deficit Setup; Increased time to complete  (seated unsupported at EOB)   UB Bathing Assistance Unable to assess  (anticipate mod A seated based on fxal obs skills,clinical judgement)   LB Bathing Assistance Unable to assess   UB Dressing Assistance 6  Modified independent   UB Dressing Deficit Setup; Increased time to complete   LB Dressing Assistance 2  Maximal Assistance   LB Dressing Deficit Setup;Steadying; Requires assistive device for steadying;Pull up over hips   Toileting Assistance  Unable to assess  (denied need to void; reports using urinal w/ mod I for + time, set- up seated)   Additional Comments Educated / reinforced R LE NWB   Bed Mobility   Supine to Sit Unable to assess   Sit to Supine Unable to assess   Additional Comments Pt seated at EOB upon arrival and post re-eval w/ needs met, call bell in reach   Transfers   Sit to Stand   (max A sit to partial stand from elevated bed height)   Additional items Assist x 1; Increased time required;Verbal cues; Bedrails;Armrests   Stand to Sit   (max A partial stand to sit)   Additional items Assist x 1; Increased time required;Verbal cues; Bedrails;Armrests   Stand pivot Unable to assess  (unable to complete sit to stand from elevated bed height while maintaining R LE NWB)   Functional Mobility   Additional Comments NT. Will continue to assess   Balance   Static Sitting Normal   Static Standing Zero  (NT)   Ambulatory   (NT)   Activity Tolerance   Activity Tolerance Patient limited by fatigue   Medical Staff Made Aware spoke w/ PTGeetha   Nurse Made Aware spoke w/ RN, Gen   RUE Assessment   RUE Assessment WFL   RUE Strength   RUE Overall Strength Within Functional Limits - able to perform ADL tasks with strength   LUE Assessment   LUE Assessment WFL   LUE Strength   LUE Overall Strength Within Functional Limits - able to perform ADL tasks with strength   Hand Function   Gross Motor Coordination Functional   Fine Motor Coordination Functional   Psychosocial   Patient Behaviors/Mood Cooperative;Calm;Pleasant   Cognition   Overall Cognitive Status WFL   Arousal/Participation Alert; Cooperative   Attention Attends with cues to redirect   Orientation Level Oriented X4   Memory (appears WFL during conversation, social history)   Following Commands Follows multistep commands without difficulty   Comments Identified pt by full name and birthdate. Alert, oriented and able to participate in conversation. Verbalized understanding R LE NWB but unable to maintain   Assessment   Limitation Decreased ADL status; Decreased endurance;Decreased self-care trans;Decreased high-level ADLs  (impaired R LE ROM / strength, generalized weakness, activity tolerance, standing tolerance, standing balance)   Assessment Pt is a 57yo male admitted to [facility] on 08/19/23. Pt seen for OT re-eval s/p R 1st partial ray amputation on 8/31/23 and per podiatry is NWB R LE. Please refer to OT initial eval for detailed PMH and social history. Upon re-eval, pt alert and oriented. Able to follow directions and participate in conversation. Pt verbalized understanding R LE NWB but benefits from cues and assist to maintain during transitions and LB ADLs. Pt required mod I to complete grooming seated, mod I UBD, max A LBD, max A sit to partial stand from elevated bed height. Pt demonstrated B UE AROM and strength WFL to participate in ADLs. Pt completing ADLs below baseline level of I and would benefit from continued OT in acute care to max functional independence. Recommend post acute rehab when medically stable for discharge from acute care. Will continue to follow   Goals   Patient Goals Pt stated that he would like to go to rehab and get stronger to return to baseline level of I, spned time w/ his daughgters / grandsons   Plan   Treatment Interventions ADL retraining;Functional transfer training; Endurance training;Patient/family training;Equipment evaluation/education; Compensatory technique education;Continued evaluation; Energy conservation; Activityengagement;UE strengthening/ROM   Goal Expiration Date 09/08/23   OT Treatment Day 0  (Friday 9/1/23)   OT Frequency 3-5x/wk   Recommendation   OT Discharge Recommendation Post acute rehabilitation services   AM-PAC Daily Activity Inpatient   Lower Body Dressing 2   Bathing 2   Toileting 2   Upper Body Dressing 4   Grooming 4   Eating 4   Daily Activity Raw Score 18   Daily Activity Standardized Score (Calc for Raw Score >=11) 38.66   AM-PAC Applied Cognition Inpatient   Following a Speech/Presentation 4   Understanding Ordinary Conversation 4   Taking Medications 4   Remembering Where Things Are Placed or Put Away 4   Remembering List of 4-5 Errands 4   Taking Care of Complicated Tasks 3   Applied Cognition Raw Score 23   Applied Cognition Standardized Score 53.08   Barthel Index   Feeding 10   Bathing 0   Grooming Score 5   Dressing Score 5   Bladder Score 10   Bowels Score 10   Toilet Use Score 0   Transfers (Bed/Chair) Score 5   Mobility (Level Surface) Score 0   Stairs Score 0   Barthel Index Score 45   Modified Andrzej Scale   Modified Gilchrist Scale 4   Additional Treatment Session   Start Time 1342   End Time 1351   Treatment Assessment Pt seen for skilled OT tx session day 1 following eval focusing on activity engagement and continued evaluation. Pt agreeable and motivated to participate reporting he would like to be able transfer to differrent surface. Therapist educated pt on use of sliding board to max I w/ toileting and improve engagement, sitting tolerance OOB. Pt able to lateralyl sit scoot alond edge of bed w/ S using B UE and L LE while demosntrating good recall R LE NWB. Pt seated at EOB at end of session w/ needs met, call bell in reach. Continue to recommend post acute rehab when medically stable for discharge from [facility] care and ongoing eval / tx using sliding board as appropriate to improve engagement and I w/ toileting.  Will continue to follow   Additional Treatment Day 1  (Friday 9/1/23)   End of Consult   Patient Position at End of Consult All needs within reach   Nurse Communication Nurse aware of consult   Licensure   [place] License Number  MOSES Daley/DEVIN [id number]      Pt goals to be met by 9/8/23 to max I w/ ADLs and improve engagement to return home to baseline level of I to spend time w/ his grandsons includes:    -Pt will consistently demonstrate good recall and understanding R LE NWB during LBD and bed mobility    -Pt will complete LBD w/ min A using LHAE as needed to max I and minimize burden of care    -Pt will demonstrate improved activity and sitting tolerance OOB in chair for all meals    -Pt will demonstrate good attention and participation in ongoing eval of functional transfers to bed, chair and toilet using LRAD to max I w/ ADLs and assist w/ DC planning    -Pt will demonstrate good attention and participation in ongoing eval of functional mobility using LRAD to max I and improve engagement    -Pt will demonstrate good attention and understanding EC tech to max I and improve engagement      The patient's raw score on the AM-PAC Daily Activity Inpatient Short Form is 18. A raw score of less than 19 suggests the patient may benefit from discharge to post-acute rehabilitation services. Please refer to the recommendation of the Occupational Therapist for safe discharge planning.     MOSES King/DEVIN  [id number]  [id number]

## 2023-09-01 NOTE — PROGRESS NOTES
Mo Barton is a 58 y.o. male who is currently ordered Vancomycin IV with management by the Pharmacy Consult service. Relevant clinical data and objective / subjective history reviewed. Vancomycin Assessment:  Indication and Goal AUC/Trough: Bone/joint infection (goal -600, trough >10), -600, trough >10  Clinical Status: stable  Micro: pending  Renal Function:  SCr: 5.85 mg/dL  CrCl: 16.9 mL/min  Renal replacement: HD  Days of Therapy: 4  Current Dose: 1000 mg IV 3 times weekly post HD on Tuesday, Thursday, Saturday  Vancomycin Plan:  Continue the current plan. Dose will be given tomorrow after HD. Estimated AUC: 400-600 mcg*hr/mL  Estimated Trough: >10 mcg/mL  Next Level: 9/2/23 @0600  Renal Function Monitoring: Daily BMP and [place] will continue to follow closely for s/sx of nephrotoxicity, infusion reactions and appropriateness of therapy. BMP and CBC will be ordered per protocol. We will continue to follow the patient’s culture results and clinical progress daily.     Destin Bennett, Pharmacist

## 2023-09-01 NOTE — ASSESSMENT & PLAN NOTE
Lab Results   Component Value Date    HGBA1C 6.6 (H) 07/07/2022       Recent Labs     08/31/23  1855 08/31/23  2102 09/01/23  0732 09/01/23  1142   POCGLU 130 172* 190* 151*       Blood Sugar Average: Last 72 hrs:  (P) [id number]     Podiatry consulted for [place] grade 1 ulcer submetatarsal 1 left foot with cellulitis and concern for septic joint and osteomyelitis on MRI  · S/P Debridement 8/21 and 8/24  · Noted to have elevated CRP, procalcitonin  · Wound culture 8/21 growing 1 + pseudomonas aeruginosa, 2 + enterobacter cloacae  · Wound culture 8/24 growing proteus, enterobacter, enterococcus  · MRI R foot: First submetatarsal head plantar skin ulceration with suspected draining sinus tract extending to the first metatarsal phalangeal joint suspicious for septic arthropathy  · Vascular consulted as imaging showing lower limb diffuse atherosclerotic disease noted throughout with elevated PSV of proximal popliteal artery and tibial peroneal trunk suggesting less than 50% stenosis, metatarsal pressure greater than 200, great toe pressure 95 mmHg within the healing range on right side. Left lower limb showed diffuse atherosclerotic disease noted throughout with a 50 to 75% stenosis of the proximal popliteal artery, metatarsal pressure 170, great toe pressure 98 mmHg within healing range.   · angiogram by IR  8/29/23  · Podiatry took patient to the OR for right first ray partial resection and application of wound VAC on 8/31  · Plan for surgical dressing change tomorrow and possible delayed primary closure early next week

## 2023-09-01 NOTE — PROGRESS NOTES
[address]  Progress Note  Name: Louise Gaming  MRN: [id number]  Unit/Bed#: 913 [facility] 421-01 I Date of Admission: 8/19/2023   Date of Service: 9/1/2023 I Hospital Day: 13    Assessment/Plan   * Polymicrobial bacterial infection  Assessment & Plan  Polymicrobial bacteremia.  Enterobacter/Proteus/Streptococcus/Granulicatella  · Suspect cause of patient's initial metabolic encephalopathy which has improved with abx treatment  · ID following  · Repeat blood cultures negative  · D/c cefepime and flagyl following surgical cure  · Started on vancomycin 8/29 - continue through 9/6  · Afebrile  · Monitor fever curve and cbc    Diabetic ulcer of left midfoot associated with type 2 diabetes mellitus, limited to breakdown of skin [facility])  Assessment & Plan  Lab Results   Component Value Date    HGBA1C 6.6 (H) 07/07/2022       Recent Labs     08/31/23  1855 08/31/23  2102 09/01/23  0732 09/01/23  1142   POCGLU 130 172* 190* 151*       Blood Sugar Average: Last 72 hrs:  (P) [id number]     Podiatry consulted for Delgadillo grade 1 ulcer submetatarsal 1 left foot with cellulitis and concern for septic joint and osteomyelitis on MRI  · S/P Debridement 8/21 and 8/24  · Noted to have elevated CRP, procalcitonin  · Wound culture 8/21 growing 1 + pseudomonas aeruginosa, 2 + enterobacter cloacae  · Wound culture 8/24 growing proteus, enterobacter, enterococcus  · MRI R foot: First submetatarsal head plantar skin ulceration with suspected draining sinus tract extending to the first metatarsal phalangeal joint suspicious for septic arthropathy  · Vascular consulted as imaging showing lower limb diffuse atherosclerotic disease noted throughout with elevated PSV of proximal popliteal artery and tibial peroneal trunk suggesting less than 50% stenosis, metatarsal pressure greater than 200, great toe pressure 95 mmHg within the healing range on right side.   Left lower limb showed diffuse atherosclerotic disease noted throughout with a 50 to 75% stenosis of the proximal popliteal artery, metatarsal pressure 170, great toe pressure 98 mmHg within healing range. · angiogram by IR  8/29/23  · Podiatry took patient to the OR for right first ray partial resection and application of wound VAC on 8/31  · Plan for surgical dressing change tomorrow and possible delayed primary closure early next week    Status post fall  Assessment & Plan  Patient sustained a fall about 2 days prior to admission   · Etiology not clear-EKG was unremarkable. Blood sugar was 157. Questionable hypoglycemia versus accidental fall. Patient not exhibiting any signs of stroke. · Telemetry showed no evidence of arrhythmia  · Patient does not remember how he fell. Patient was found on the floor and could not get up. · PT/OT evaluation and treatment, recommending home with home health currently, however patient feels that he is deconditioned and is concerned about his status postoperatively.   Continue to follow  · CAT scan of the brain, CT chest abdomen pelvis, cervical spine and lumbar reconstructions study was ordered in the ED but patient refused the studies  · Patient got CT of the head and neck after receiving Ativan which was unremarkable  · Fall precautions    Hypertension  Assessment & Plan  · Continue Procardia 30 mg p.o. twice daily, Coreg 12.5 mg p.o. twice daily  · Vital signs as per unit routine    Type 2 diabetes mellitus, without long-term current use of insulin [facility])  Assessment & Plan  Lab Results   Component Value Date    HGBA1C 6.6 (H) 07/07/2022       Recent Labs     08/31/23  1855 08/31/23  2102 09/01/23  0732 09/01/23  1142   POCGLU 130 172* 190* 151*       Blood Sugar Average: Last 72 hrs:  (P) [id number]   · Patient is on linagliptin at home  · Continue Humalog sliding scale with Accu-Cheks before every meal and at bedtime  · diabetic diet  · lantus 15 hs and lispro 5 units tid with meals    ESRD (end stage renal disease) [facility])  Assessment & Plan  Lab Results   Component Value Date    EGFR 9 09/01/2023    EGFR 6 08/31/2023    EGFR 8 08/30/2023    CREATININE 5.85 (H) 09/01/2023    CREATININE 7.76 (H) 08/31/2023    CREATININE 6.49 (H) 08/30/2023   · Patient is on Tuesday Thursday Saturday schedule at [address]  · IJ  permcath removed, temporary  catheter placed 8/25  · Nephrology following  · Patient cleared by ID for permacath placement which he underwent on 8/30  · Hemodialysis on 8/31; dialysis nurse reported some difficulty with occlusion, administered Cathflo successfully and patient was able to complete his dialysis session without difficulties. VTE Pharmacologic Prophylaxis: VTE Score: 2 Moderate Risk (Score 3-4) - Pharmacological DVT Prophylaxis Ordered: heparin. Patient Centered Rounds: I performed bedside rounds with nursing staff today. Discussions with Specialists or Other Care Team Provider: ID, rn, cm    Education and Discussions with Family / Patient: Patient declined call to . Total Time Spent on Date of Encounter in care of patient: 35 minutes This time was spent on one or more of the following: performing physical exam; counseling and coordination of care; obtaining or reviewing history; documenting in the medical record; reviewing/ordering tests, medications or procedures; communicating with other healthcare professionals and discussing with patient's family/caregivers. Current Length of Stay: 13 day(s)  Current Patient Status: Inpatient   Certification Statement: The patient will continue to require additional inpatient hospital stay due to IV abx, podiatry clearance, possible OR  Discharge Plan: Anticipate discharge in 48-72 hrs to rehab facility. Code Status: Level 1 - Full Code    Subjective:   Patient reports feeling well this morning. He denies any complaints including fever, chills, chest pain, shortness of breath, nausea, vomiting, diarrhea, or foot pain.   Eating and drinking well. Objective:     Vitals:   Temp (24hrs), Av.4 °F (36.3 °C), Min:97 °F (36.1 °C), Max:97.8 °F (36.6 °C)    Temp:  [97 °F (36.1 °C)-97.8 °F (36.6 °C)] 97.4 °F (36.3 °C)  HR:  [68-74] 70  Resp:  [12-28] 18  BP: (125-144)/(57-73) 144/59  SpO2:  [90 %-100 %] 92 %  Body mass index is 33.38 kg/m². Input and Output Summary (last 24 hours): Intake/Output Summary (Last 24 hours) at 2023 1559  Last data filed at 2023 1601  Gross per 24 hour   Intake 10 ml   Output 400 ml   Net -390 ml       Physical Exam:   Physical Exam  Vitals and nursing note reviewed. Constitutional:       General: He is not in acute distress. Appearance: He is well-developed. Cardiovascular:      Rate and Rhythm: Normal rate and regular rhythm. Pulmonary:      Effort: Pulmonary effort is normal.      Breath sounds: Normal breath sounds. Abdominal:      Palpations: Abdomen is soft. Tenderness: There is no abdominal tenderness. Musculoskeletal:         General: No swelling. Cervical back: Neck supple. Skin:     General: Skin is warm and dry. Capillary Refill: Capillary refill takes less than 2 seconds. Comments: Right foot surgical dressing and wound vac in place   Neurological:      Mental Status: He is alert.    Psychiatric:         Mood and Affect: Mood normal.         Additional Data:     Labs:  Results from last 7 days   Lab Units 23  0522   WBC Thousand/uL 14.32*   HEMOGLOBIN g/dL 9.3*   HEMATOCRIT % 30.1*   PLATELETS Thousands/uL 297     Results from last 7 days   Lab Units 23  0522 23  0501 23  0455   SODIUM mmol/L 133*   < > 131*   POTASSIUM mmol/L 5.0   < > 3.8   CHLORIDE mmol/L 98   < > 95*   CO2 mmol/L 25   < > 27   BUN mg/dL 31*   < > 36*   CREATININE mg/dL 5.85*   < > 5.84*   ANION GAP mmol/L 10   < > 9   CALCIUM mg/dL 8.2*   < > 7.8*   ALBUMIN g/dL  --   --  3.1*   GLUCOSE RANDOM mg/dL 164*   < > 140    < > = values in this interval not displayed. Results from last 7 days   Lab Units 09/01/23  1142 09/01/23  0732 08/31/23  2102 08/31/23  1855 08/31/23  1601 08/31/23  1109 08/31/23  0730 08/30/23  2152 08/30/23  1627 08/30/23  1111 08/30/23  0743 08/29/23  2040   POC GLUCOSE mg/dl 151* 190* 172* 130 110 132 123 195* 94 118 112 141*               Lines/Drains:  Invasive Devices     Peripheral Intravenous Line  Duration           Peripheral IV 08/30/23 Distal;Left;Ventral (anterior) Forearm 1 day          Hemodialysis Catheter  Duration           HD Permanent Double Catheter 2 days                      Imaging: No pertinent imaging reviewed.     Recent Cultures (last 7 days):   Results from last 7 days   Lab Units 08/31/23  1739 08/31/23  1738   GRAM STAIN RESULT  No Polys or Bacteria seen  Rare Epithelial Cells No Polys or Bacteria seen   WOUND CULTURE  No growth No growth       Last 24 Hours Medication List:   Current Facility-Administered Medications   Medication Dose Route Frequency Provider Last Rate   • acetaminophen  650 mg Oral Q6H PRN Claude Ramal, DPM     • acetaminophen  975 mg Oral Q8H Won Parmar DPM     • atorvastatin  80 mg Oral Daily Claude Ramal, DPM     • carvedilol  12.5 mg Oral BID Claude Ramal, DPM     • famotidine  10 mg Oral Daily Claude Ramal, DPM     • gabapentin  200 mg Oral BID Claude Ramal, DPM     • haloperidol lactate  2 mg Intramuscular Q6H PRN Claude Ramal, DPM     • heparin (porcine)  5,000 Units Subcutaneous [facility] Claude Ramal, DPM     • HYDROmorphone  1 mg Intravenous Q4H PRN Claude Ramal, DPM     • insulin glargine  15 Units Subcutaneous HS Claude Ramal, DPM     • insulin lispro  1-5 Units Subcutaneous HS Won Parmar DPM     • insulin lispro  2-12 Units Subcutaneous TID AC Won Parmar DPM     • insulin lispro  5 Units Subcutaneous TID With Meals Claude Ramal, DPM     • lidocaine  1 patch Topical Daily Won Parmar DPM     • LORazepam  0.5 mg Intravenous Q6H PRN Claude Ramal, DPM     • methocarbamol  500 mg Oral Q12H PRN Auther Josemanuel, DPM     • NIFEdipine  30 mg Oral BID Auther Josemanuel, DPM     • ondansetron  4 mg Intravenous Q6H PRN Auther Josemanuel, DPM     • oxyCODONE  5 mg Oral Q8H PRN Auther Josemanuel, DPM     • senna-docusate sodium  2 tablet Oral BID Auther Josemanuel, DPM     • sevelamer  800 mg Oral TID With Meals Auther Josemanuel, DPM     • vancomycin  10 mg/kg (Adjusted) Intravenous Once per day on Tue Thu Hilario Berry PA-C 1,000 mg (08/31/23 8232)        Today, Patient Was Seen By: Jeannie James PA-C    **Please Note: This note may have been constructed using a voice recognition system. **

## 2023-09-01 NOTE — ASSESSMENT & PLAN NOTE
Polymicrobial bacteremia.  Enterobacter/Proteus/Streptococcus/Granulicatella  · Suspect cause of patient's initial metabolic encephalopathy which has improved with abx treatment  · ID following  · Repeat blood cultures negative  · D/c cefepime and flagyl following surgical cure  · Started on vancomycin 8/29 - continue through 9/6  · Afebrile  · Monitor fever curve and cbc

## 2023-09-01 NOTE — PROGRESS NOTES
Progress Note - Infectious Disease   Ashton Bold 58 y.o. male MRN: [id number]  Unit/Bed#: 913 [facility] 421-01 Encounter: [id number]      Impression/Plan:  1. Polymicrobic bacteremia. In setting of #3, admission blood cultures 1 of 2 sets grew Granulicatella adiacens, Enterobacter cloacae, Proteus vulgaris and Bacteroides fragilis. Patient with a permacath since 1/27/22 as well as an active right foot diabetic ulcer s/p debridement as below. Permacath removed, 8/24/23 cath tip 50 col staph epi, likely contaminant. 8/24/23 TTE no vegetation seen  Granulicatella did not remain viable for sensitivities. Fortunately, 8/24/23 repeat blood cultures x 2 negative  -discontinue Cefepime/Flagyl   -continues Vancomycin, dosed by Pharmacy for 14-day course from date of clearance for treatment of gram-positive bacteremia, through 9/6.  -management as below for #3     2. Encephalopathy. 8/22/23 MRI Brain: limited to motion. No evident acute infarct  Increased altered mental status following HD session 8/23/23. improved  -infectious work up/management as above  -monitor mental status     3. Right diabetic foot ulcer with cellulitis and concern for septic joint and osteomyelitis on MRI.  8/21/23 Wound cx: 1+ pseudomonas aeruginosa pansensitive, 2+ Enterobacter cloacae, 2+ MSF. 8/23/23 MRI right foot: 1st submetatarsal head plantar ulcer with suspected sinus tract to 1st MTP joint concerning for septic arthropathy and marrow changes of tibia/fibia/sesamoid bones c/w osteomyelitis. Right foot s/p debridement 8/24/23 with tissue cxs with predominant proteus and Enterobacter growth and 1 colony enterococcus faecalis  8/29/23 RLE angiogram with mild atherosclerotic disease, 3 vessel run off to right foot  8/31/23 s/p right partial 1st ray resection for surgical cure of osteomyelitis  -antibiotics as above  -local post op wound vac care per podiatry  -ongoing podiatric follow up    4. Leukocytosis, POA. WBC 14 K. In setting of #1/3. -continue antibiotics as above     5. ESRD on HD via right chest permacath placed 2023.   -renal dose adjust antibiotic as needed  -volume/HD management per nephrology  -can change temporary to permacath this week.     6. Type 2 Diabetes Mellitus. HgbA1C 6.6  -monitor symptoms  -symptomatic management per primary care team  -advancing diet as tolerated     Antibiotics:  Vancomycin D4/Cefepime/Flagyl D11     I have discussed the above management plan in detail with patient, microbiologist, RN, and Dr. Guy Cuba of the primary service. Infectious disease consultation service will sign off for now. Please call if any new signs or symptoms of infection develop. Thank you! Subjective:  Patient has no fever, chills, sweats reported overnight; no nausea, vomiting, diarrhea; no cough, shortness of breath; no foot pain post op or with wound vac in place. Objective:  Vitals:  Temp:  [97 °F (36.1 °C)-97.8 °F (36.6 °C)] 97.4 °F (36.3 °C)  HR:  [65-74] 70  Resp:  [12-28] 18  BP: (125-144)/(57-73) 144/59  SpO2:  [90 %-100 %] 92 %  Temp (24hrs), Av.4 °F (36.3 °C), Min:97 °F (36.1 °C), Max:97.8 °F (36.6 °C)  Current: Temperature: (!) 97.4 °F (36.3 °C)    Physical Exam:   General Appearance:  58year old male, chronically debilitated, nontoxic appearance today, alert and oriented, no acute distress   HEENT: Atraumatic normocephalic   Throat: Oropharynx moist. Poor dentition   Pulmonary:   Normal respiratory excursion without accessory muscle use   Cardiac:  RRR   Abdomen:   Soft, non-tender, non-distended, protuberant   Extremities: Venous stained LEs, multiple scabbed abrasions of knees and shins. Right foot post op wound vac dressing in place with mild serosangious output in canister   : No gaitan, no SPT   Psychiatric: Awake, cooperative   Skin: No new rashes. IV site nontender. Right chest new permacath site nontender.        Labs, Imaging, & Other studies:   All pertinent labs and imaging studies were personally reviewed  Results from last 7 days   Lab Units 09/01/23  0522 08/31/23  0450 08/30/23  0553   WBC Thousand/uL 14.32* 13.20* 14.70*   HEMOGLOBIN g/dL 9.3* 9.7* 9.3*   PLATELETS Thousands/uL 297 302 302     Results from last 7 days   Lab Units 09/01/23  0522 08/31/23  0450 08/30/23  0553   SODIUM mmol/L 133* 135 134*   POTASSIUM mmol/L 5.0 4.5 4.2   CHLORIDE mmol/L 98 101 100   CO2 mmol/L 25 25 25   BUN mg/dL 31* 47* 38*   CREATININE mg/dL 5.85* 7.76* 6.49*   EGFR ml/min/1.73sq m 9 6 8   CALCIUM mg/dL 8.2* 8.3* 8.2*     Results from last 7 days   Lab Units 08/31/23  1739 08/31/23  1738   GRAM STAIN RESULT  No Polys or Bacteria seen  Rare Epithelial Cells No Polys or Bacteria seen   WOUND CULTURE  No growth No growth

## 2023-09-01 NOTE — PLAN OF CARE
Problem: OCCUPATIONAL THERAPY ADULT  Goal: Performs self-care activities at highest level of function for planned discharge setting. See evaluation for individualized goals. Description: Treatment Interventions: ADL retraining, Functional transfer training, UE strengthening/ROM, Endurance training, Patient/family training, Equipment evaluation/education, Activityengagement, Compensatory technique education          See flowsheet documentation for full assessment, interventions and recommendations. Note: Limitation: Decreased ADL status, Decreased endurance, Decreased self-care trans, Decreased high-level ADLs (impaired R LE ROM / strength, generalized weakness, activity tolerance, standing tolerance, standing balance)  Prognosis: Good  Assessment: Pt is a 57yo male admitted to [address] [place] [address] Drive on 08/19/23. Pt seen for OT re-eval s/p R 1st partial ray amputation on 8/31/23 and per podiatry is NWB R LE. Please refer to OT initial eval for detailed PMH and social history. Upon re-eval, pt alert and oriented. Able to follow directions and participate in conversation. Pt verbalized understanding R LE NWB but benefits from cues and assist to maintain during transitions and LB ADLs. Pt required mod I to complete grooming seated, mod I UBD, max A LBD, max A sit to partial stand from elevated bed height. Pt demonstrated B UE AROM and strength WFL to participate in ADLs. Pt completing ADLs below baseline level of I and would benefit from continued OT in acute care to max functional independence. Recommend post acute rehab when medically stable for discharge from acute care.  Will continue to follow     OT Discharge Recommendation: Post acute rehabilitation services

## 2023-09-01 NOTE — PLAN OF CARE
Problem: PHYSICAL THERAPY ADULT  Goal: Performs mobility at highest level of function for planned discharge setting. See evaluation for individualized goals. Description: Treatment/Interventions: ADL retraining, Functional transfer training, LE strengthening/ROM, Elevations, Therapeutic exercise, Endurance training, Bed mobility, Gait training, Spoke to nursing, OT          See flowsheet documentation for full assessment, interventions and recommendations. Outcome: Progressing  Note: Prognosis: Good  Problem List: Decreased strength, Decreased range of motion, Decreased endurance, Decreased mobility, Impaired balance, Decreased safety awareness, Decreased skin integrity, Orthopedic restrictions, Pain  Assessment: Pt agreeable to PT session this AM. Recieved sitting at EOB with RLE in dependent position, wound vac on. Attempted STS from elevated HOB but pt presents with difficulty maintaining NWB status to RLE despite verbal/tactile cues. Once standing able to maintain NWB with stand by assist but unable to ambulate due to difficulty hopping/unable to maintain NWB RLE. Able to perform exercise as mentioned below with good response. PT Discharge Recommendation: Post acute rehabilitation services (pt now s/p R foot surgery, NWB RLE and will require STR)    See flowsheet documentation for full assessment.

## 2023-09-01 NOTE — ASSESSMENT & PLAN NOTE
Lab Results   Component Value Date    HGBA1C 6.6 (H) 07/07/2022       Recent Labs     08/31/23  1855 08/31/23  2102 09/01/23  0732 09/01/23  1142   POCGLU 130 172* 190* 151*       Blood Sugar Average: Last 72 hrs:  (P) 365.5993262747011503   · Patient is on linagliptin at home  · Continue Humalog sliding scale with Accu-Cheks before every meal and at bedtime  · diabetic diet  · lantus 15 hs and lispro 5 units tid with meals

## 2023-09-01 NOTE — PROGRESS NOTES
Podiatry - Progress Note  Patient: Sunday Valencia 58 y.o. male   MRN: [id number]  PCP: Kortney Abernathy MD  Unit/Bed#: 913 [facility] 421-01 Encounter: [id number]  Date Of Visit: 23    ASSESSMENT:    Sunday Valencia is a 58 y.o. male with:    1. Right submet 1 ulceration probing to bone with underlying osteomyelitis with septic arthritis - Delgadillo 3  1. S/p partial 1st ray resection with application of wound VAC  2. T2DM with peripheral neuropathy  3. ESRD      PLAN:    · Post-surgical dressing located on surgical site and affected extremity were left intact today. · Will plan for first complete post-surgical dressing change tomorrow. · Vitals signs stable. Patient afebrile with no leukocytosis. No erythema, edema, or lymphangitis noted either proximal or distal to the dressings. · Follow-up pre and post lavage cultures taken intraoperatively  · Will plan for possible delayed primary closure early next week, timing TBD  · Pain is well controlled. Continue current pain management regimen. · Continue non-weight bearing to affected extremity with elevation while non-ambulatory. · Rest of medical care per primary team.       SUBJECTIVE:     The patient was seen, evaluated, and assessed at bedside today. The patient was awake, alert, and in no acute distress. The patient reports no pain at this time. Pain is well controlled with current pain management regimen. Patient reports normal appetite and using the restroom postoperatively. Patient denies N/V/F/chills/SOB/CP. OBJECTIVE:     Vitals:   /59   Pulse 70   Temp (!) 97.4 °F (36.3 °C)   Resp 18   Ht 6' 1" (1.854 m)   Wt 115 kg (253 lb)   SpO2 92%   BMI 33.38 kg/m²     Temp (24hrs), Av.5 °F (36.4 °C), Min:97 °F (36.1 °C), Max:97.8 °F (36.6 °C)      Physical Exam:     General:  Alert, cooperative, and in no distress. Lower extremity exam:  Cardiovascular status at baseline. Neurological status at baseline. Musculoskeletal status at baseline. No erythema, edema, or lymphangitis noted from dressing. Lower extremity temperature WNL. Wound VAC functioning correctly at 125 mmHg low continuous with no leaks or blockages noted. Additional Data:     Labs:    Results from last 7 days   Lab Units 09/01/23  0522   WBC Thousand/uL 14.32*   HEMOGLOBIN g/dL 9.3*   HEMATOCRIT % 30.1*   PLATELETS Thousands/uL 297     Results from last 7 days   Lab Units 09/01/23  0522   POTASSIUM mmol/L 5.0   CHLORIDE mmol/L 98   CO2 mmol/L 25   BUN mg/dL 31*   CREATININE mg/dL 5.85*   CALCIUM mg/dL 8.2*           * I Have Reviewed All Lab Data Listed Above. Recent Cultures (last 7 days):     Results from last 7 days   Lab Units 08/31/23  1739 08/31/23  1738   GRAM STAIN RESULT  No Polys or Bacteria seen  Rare Epithelial Cells No Polys or Bacteria seen     Results from last 7 days   Lab Units 08/31/23  1739 08/31/23  1738   ANAEROBIC CULTURE  Culture results to follow. Culture results to follow. Imaging: I have personally reviewed pertinent films in PACS  EKG, Pathology, and Other Studies: I have personally reviewed pertinent reports. ** Please Note: Portions of the record may have been created with voice recognition software. Occasional wrong word or "sound a like" substitutions may have occurred due to the inherent limitations of voice recognition software. Read the chart carefully and recognize, using context, where substitutions have occurred.  **

## 2023-09-01 NOTE — PLAN OF CARE
Problem: Potential for Falls  Goal: Patient will remain free of falls  Description: INTERVENTIONS:  - Educate patient/family on patient safety including physical limitations  - Instruct patient to call for assistance with activity   - Consult OT/PT to assist with strengthening/mobility   - Keep Call bell within reach  - Keep bed low and locked with side rails adjusted as appropriate  - Keep care items and personal belongings within reach  - Initiate and maintain comfort rounds  - Make Fall Risk Sign visible to staff  - Apply yellow socks and bracelet for high fall risk patients  - Consider moving patient to room near nurses station  Outcome: Progressing     Problem: MOBILITY - ADULT  Goal: Maintain or return to baseline ADL function  Description: INTERVENTIONS:  - Educate patient/family on patient safety including physical limitations  - Instruct patient to call for assistance with activity   - Consult OT/PT to assist with strengthening/mobility   - Keep Call bell within reach  - Keep bed low and locked with side rails adjusted as appropriate  - Keep care items and personal belongings within reach  - Initiate and maintain comfort rounds  - Make Fall Risk Sign visible to staff  - Apply yellow socks and bracelet for high fall risk patients  - Consider moving patient to room near nurses station  Outcome: Progressing  Goal: Maintains/Returns to pre admission functional level  Description: INTERVENTIONS:  - Perform BMAT or MOVE assessment daily.   - Set and communicate daily mobility goal to care team and patient/family/caregiver. - Collaborate with rehabilitation services on mobility goals if consulted  - Perform Range of Motion 2 times a day. - Reposition patient every 2 hours.   - Dangle patient 2 times a day  - Stand patient 2 times a day  - Ambulate patient 2 times a day  - Out of bed to chair 2 times a day   - Out of bed for meals 3 times a day  - Out of bed for toileting  - Record patient progress and toleration of activity level   Outcome: Progressing     Problem: PAIN - ADULT  Goal: Verbalizes/displays adequate comfort level or baseline comfort level  Description: Interventions:  - Encourage patient to monitor pain and request assistance  - Assess pain using appropriate pain scale  - Administer analgesics based on type and severity of pain and evaluate response  - Implement non-pharmacological measures as appropriate and evaluate response  - Consider cultural and social influences on pain and pain management  - Notify physician/advanced practitioner if interventions unsuccessful or patient reports new pain  Outcome: Progressing     Problem: INFECTION - ADULT  Goal: Absence or prevention of progression during hospitalization  Description: INTERVENTIONS:  - Assess and monitor for signs and symptoms of infection  - Monitor lab/diagnostic results  - Monitor all insertion sites, i.e. indwelling lines, tubes, and drains  - Monitor endotracheal if appropriate and nasal secretions for changes in amount and color  - Valier appropriate cooling/warming therapies per order  - Administer medications as ordered  - Instruct and encourage patient and family to use good hand hygiene technique  - Identify and instruct in appropriate isolation precautions for identified infection/condition  Outcome: Progressing  Goal: Absence of fever/infection during neutropenic period  Description: INTERVENTIONS:  - Monitor WBC    Outcome: Progressing

## 2023-09-02 ENCOUNTER — APPOINTMENT (INPATIENT)
Dept: DIALYSIS | Facility: HOSPITAL | Age: 63
DRG: 474 | End: 2023-09-02
Attending: STUDENT IN AN ORGANIZED HEALTH CARE EDUCATION/TRAINING PROGRAM
Payer: MEDICARE

## 2023-09-02 LAB
ANION GAP SERPL CALCULATED.3IONS-SCNC: 9 MMOL/L
BUN SERPL-MCNC: 41 MG/DL (ref 5–25)
CALCIUM SERPL-MCNC: 8 MG/DL (ref 8.4–10.2)
CHLORIDE SERPL-SCNC: 97 MMOL/L (ref 96–108)
CO2 SERPL-SCNC: 28 MMOL/L (ref 21–32)
CREAT SERPL-MCNC: 7.03 MG/DL (ref 0.6–1.3)
ERYTHROCYTE [DISTWIDTH] IN BLOOD BY AUTOMATED COUNT: 17.4 % (ref 11.6–15.1)
GFR SERPL CREATININE-BSD FRML MDRD: 7 ML/MIN/1.73SQ M
GLUCOSE SERPL-MCNC: 115 MG/DL (ref 65–140)
GLUCOSE SERPL-MCNC: 132 MG/DL (ref 65–140)
GLUCOSE SERPL-MCNC: 132 MG/DL (ref 65–140)
GLUCOSE SERPL-MCNC: 149 MG/DL (ref 65–140)
GLUCOSE SERPL-MCNC: 99 MG/DL (ref 65–140)
HCT VFR BLD AUTO: 29.7 % (ref 36.5–49.3)
HGB BLD-MCNC: 9.3 G/DL (ref 12–17)
MCH RBC QN AUTO: 29.2 PG (ref 26.8–34.3)
MCHC RBC AUTO-ENTMCNC: 31.3 G/DL (ref 31.4–37.4)
MCV RBC AUTO: 93 FL (ref 82–98)
PLATELET # BLD AUTO: 313 THOUSANDS/UL (ref 149–390)
PMV BLD AUTO: 9.6 FL (ref 8.9–12.7)
POTASSIUM SERPL-SCNC: 4.3 MMOL/L (ref 3.5–5.3)
RBC # BLD AUTO: 3.19 MILLION/UL (ref 3.88–5.62)
SODIUM SERPL-SCNC: 134 MMOL/L (ref 135–147)
VANCOMYCIN SERPL-MCNC: 22.5 UG/ML (ref 10–20)
WBC # BLD AUTO: 11.48 THOUSAND/UL (ref 4.31–10.16)

## 2023-09-02 PROCEDURE — 82948 REAGENT STRIP/BLOOD GLUCOSE: CPT

## 2023-09-02 PROCEDURE — 85027 COMPLETE CBC AUTOMATED: CPT

## 2023-09-02 PROCEDURE — 80048 BASIC METABOLIC PNL TOTAL CA: CPT

## 2023-09-02 PROCEDURE — 99232 SBSQ HOSP IP/OBS MODERATE 35: CPT | Performed by: INTERNAL MEDICINE

## 2023-09-02 PROCEDURE — 80202 ASSAY OF VANCOMYCIN: CPT | Performed by: STUDENT IN AN ORGANIZED HEALTH CARE EDUCATION/TRAINING PROGRAM

## 2023-09-02 PROCEDURE — 99232 SBSQ HOSP IP/OBS MODERATE 35: CPT

## 2023-09-02 RX ORDER — POLYETHYLENE GLYCOL 3350 17 G/17G
17 POWDER, FOR SOLUTION ORAL DAILY
Status: DISCONTINUED | OUTPATIENT
Start: 2023-09-02 | End: 2023-09-06 | Stop reason: HOSPADM

## 2023-09-02 RX ORDER — CALCIUM CARBONATE 500 MG/1
1000 TABLET, CHEWABLE ORAL 2 TIMES DAILY PRN
Status: DISCONTINUED | OUTPATIENT
Start: 2023-09-02 | End: 2023-09-06 | Stop reason: HOSPADM

## 2023-09-02 RX ORDER — CALCIUM CARBONATE 500 MG/1
500 TABLET, CHEWABLE ORAL 3 TIMES DAILY PRN
Status: DISCONTINUED | OUTPATIENT
Start: 2023-09-02 | End: 2023-09-02

## 2023-09-02 RX ORDER — CALCIUM CARBONATE 500 MG/1
500 TABLET, CHEWABLE ORAL ONCE
Status: COMPLETED | OUTPATIENT
Start: 2023-09-03 | End: 2023-09-03

## 2023-09-02 RX ADMIN — HEPARIN SODIUM 5000 UNITS: 5000 INJECTION INTRAVENOUS; SUBCUTANEOUS at 15:08

## 2023-09-02 RX ADMIN — HEPARIN SODIUM 5000 UNITS: 5000 INJECTION INTRAVENOUS; SUBCUTANEOUS at 22:01

## 2023-09-02 RX ADMIN — INSULIN GLARGINE 15 UNITS: 100 INJECTION, SOLUTION SUBCUTANEOUS at 22:01

## 2023-09-02 RX ADMIN — SEVELAMER HYDROCHLORIDE 800 MG: 800 TABLET ORAL at 12:08

## 2023-09-02 RX ADMIN — SENNOSIDES AND DOCUSATE SODIUM 2 TABLET: 50; 8.6 TABLET ORAL at 08:50

## 2023-09-02 RX ADMIN — NIFEDIPINE 30 MG: 30 TABLET, EXTENDED RELEASE ORAL at 08:50

## 2023-09-02 RX ADMIN — INSULIN LISPRO 5 UNITS: 100 INJECTION, SOLUTION INTRAVENOUS; SUBCUTANEOUS at 12:08

## 2023-09-02 RX ADMIN — SENNOSIDES AND DOCUSATE SODIUM 2 TABLET: 50; 8.6 TABLET ORAL at 17:26

## 2023-09-02 RX ADMIN — ANTACID TABLETS 500 MG: 500 TABLET, CHEWABLE ORAL at 08:02

## 2023-09-02 RX ADMIN — VANCOMYCIN HYDROCHLORIDE 750 MG: 750 INJECTION, SOLUTION INTRAVENOUS at 17:36

## 2023-09-02 RX ADMIN — SEVELAMER HYDROCHLORIDE 800 MG: 800 TABLET ORAL at 17:26

## 2023-09-02 RX ADMIN — HEPARIN SODIUM 5000 UNITS: 5000 INJECTION INTRAVENOUS; SUBCUTANEOUS at 06:02

## 2023-09-02 RX ADMIN — ANTACID TABLETS 500 MG: 500 TABLET, CHEWABLE ORAL at 22:14

## 2023-09-02 RX ADMIN — INSULIN LISPRO 5 UNITS: 100 INJECTION, SOLUTION INTRAVENOUS; SUBCUTANEOUS at 17:25

## 2023-09-02 RX ADMIN — GABAPENTIN 200 MG: 250 SUSPENSION ORAL at 08:51

## 2023-09-02 RX ADMIN — ATORVASTATIN CALCIUM 80 MG: 80 TABLET, FILM COATED ORAL at 17:26

## 2023-09-02 RX ADMIN — FAMOTIDINE 10 MG: 20 TABLET, FILM COATED ORAL at 08:50

## 2023-09-02 RX ADMIN — GABAPENTIN 200 MG: 250 SUSPENSION ORAL at 17:32

## 2023-09-02 RX ADMIN — POLYETHYLENE GLYCOL 3350 17 G: 17 POWDER, FOR SOLUTION ORAL at 12:10

## 2023-09-02 RX ADMIN — ANTACID TABLETS 500 MG: 500 TABLET, CHEWABLE ORAL at 02:25

## 2023-09-02 RX ADMIN — CARVEDILOL 12.5 MG: 12.5 TABLET, FILM COATED ORAL at 12:14

## 2023-09-02 NOTE — PROGRESS NOTES
NEPHROLOGY PROGRESS NOTE   Alysa Goes 58 y.o. male MRN: [id number]  Unit/Bed#: [address] [place] [address] Encounter: [id number]  Reason for Consult: ESRD      SUMMARY:    57 yo man with PMH of ESRD on HD TTS p/w AMS.   Nephrology is consulted for management of ESRD                ASSESSMENT and PLAN:     End Stage Kidney Disease  -Modality:In-Center Hemodialysis  -Schedule: Tuesday/Thursday/Saturday  -Dialysis [facility])  -Access: New permacath placed on August 30  -Presciption: Reviewed  -Status: Underwent hemodialysis this morning without any issues. Catheter worked well. Currently below his dry weight to be UF 2 L. Blood pressure was stable. -Plan:   • Plan for next dialysis on Tuesday next follow-up on Tuesday as well.     Polymicrobial bacteremia  --Enterobacter/Proteus/Streptococcus/Granulicatella  -- Permacath removed due to bacteremia but appears that the primary source was right diabetic foot ulceration with septic arthropathy and osteomyelitis. New permacath in place. -- Status post angiogram showing underlying osteomyelitis. Status post right partial first ray resection on August 31st, with a wound VAC in place     Hypertension  -- Continue carvedilol and nifedipine     Mineral bone disorder-chronic kidney disease  -- Sevelamer with meals     Anemia of chronic kidney disease  -- Stable      SUBJECTIVE / INTERVAL HISTORY:    No chest pain or shortness of breath. Completed dialysis. Discussed with the dialysis nurse.     OBJECTIVE:  Current Weight: Weight - Scale: 115 kg (253 lb)  Vitals:    09/02/23 1000 09/02/23 1030 09/02/23 1100 09/02/23 1130   BP: 141/68 138/66 141/65 141/66   BP Location:       Pulse: 60 60 65 62   Resp: 14 14 14 14   Temp:    97.6 °F (36.4 °C)   TempSrc:    Tympanic   SpO2:       Weight:       Height:           Intake/Output Summary (Last 24 hours) at 9/2/2023 1158  Last data filed at 9/2/2023 1130  Gross per 24 hour   Intake 500 ml   Output 2000 ml   Net -1500 ml Review of Systems:    Constitutional: Negative for chills and fever. HENT: Negative for ear pain and sore throat. Eyes: Negative for pain and visual disturbance. Respiratory: Negative for cough and shortness of breath. Cardiovascular: Negative for chest pain and palpitations. Gastrointestinal: Negative for abdominal pain and vomiting. Genitourinary: Negative for dysuria and hematuria. Musculoskeletal: Negative for arthralgias and back pain. Skin: Negative for color change and rash. Neurological: Negative for seizures and syncope. 12 point ROS has been reviewed. Physical Exam  Vitals and nursing note reviewed. Constitutional:       General: He is not in acute distress. Appearance: He is well-developed. HENT:      Head: Normocephalic and atraumatic. Eyes:      General: No scleral icterus. Conjunctiva/sclera: Conjunctivae normal.      Pupils: Pupils are equal, round, and reactive to light. Cardiovascular:      Rate and Rhythm: Normal rate and regular rhythm. Heart sounds: S1 normal and S2 normal. No murmur heard. No friction rub. No gallop. Pulmonary:      Effort: Pulmonary effort is normal. No respiratory distress. Breath sounds: Normal breath sounds. No wheezing or rales. Abdominal:      General: Bowel sounds are normal.      Palpations: Abdomen is soft. Tenderness: There is no abdominal tenderness. There is no rebound. Musculoskeletal:         General: Normal range of motion. Cervical back: Normal range of motion and neck supple. Skin:     Findings: No rash. Neurological:      Mental Status: He is alert and oriented to person, place, and time.    Psychiatric:         Behavior: Behavior normal.         Medications:    Current Facility-Administered Medications:   •  acetaminophen (TYLENOL) tablet 650 mg, 650 mg, Oral, Q6H PRN, Marcelino Chandra DPM, 650 mg at 08/20/23 1141  •  acetaminophen (TYLENOL) tablet 975 mg, 975 mg, Oral, Q8H, John Delgado EUGENIA Parmar, 975 mg at 09/01/23 0520  •  atorvastatin (LIPITOR) tablet 80 mg, 80 mg, Oral, Daily, Won Parmar DPM, 80 mg at 09/01/23 1733  •  calcium carbonate (TUMS) chewable tablet 500 mg, 500 mg, Oral, TID PRN, GRANT Rich, 500 mg at 09/02/23 0802  •  carvedilol (COREG) tablet 12.5 mg, 12.5 mg, Oral, BID, Won Parmar DPM, 12.5 mg at 09/01/23 1734  •  famotidine (PEPCID) tablet 10 mg, 10 mg, Oral, Daily, Won Parmar DPM, 10 mg at 09/02/23 8325  •  gabapentin (NEURONTIN) oral solution 200 mg, 200 mg, Oral, BID, Won Parmar DPM, 200 mg at 09/02/23 1214  •  haloperidol lactate (HALDOL) injection 2 mg, 2 mg, Intramuscular, Q6H PRN, Tye Curry DPM, 2 mg at 08/23/23 1441  •  heparin (porcine) subcutaneous injection 5,000 Units, 5,000 Units, Subcutaneous, Q8H [facility], Tye Curry DPM, 5,000 Units at 09/02/23 0602  •  HYDROmorphone (DILAUDID) injection 1 mg, 1 mg, Intravenous, Q4H PRN, Tye Curry DPM, 1 mg at 08/25/23 2337  •  insulin glargine (LANTUS) subcutaneous injection 15 Units 0.15 mL, 15 Units, Subcutaneous, HS, Won Parmar DPM, 15 Units at 09/01/23 2241  •  insulin lispro (HumaLOG) 100 units/mL subcutaneous injection 1-5 Units, 1-5 Units, Subcutaneous, HS, Tye Curry DPM, 1 Units at 09/01/23 2241  •  insulin lispro (HumaLOG) 100 units/mL subcutaneous injection 2-12 Units, 2-12 Units, Subcutaneous, TID AC, 2 Units at 09/01/23 1248 **AND** Fingerstick Glucose (POCT), , , TID AC, Won Parmar DPM  •  insulin lispro (HumaLOG) 100 units/mL subcutaneous injection 5 Units, 5 Units, Subcutaneous, TID With Meals, Burnis Fleet, DPM, 5 Units at 09/01/23 1735  •  lidocaine (LIDODERM) 5 % patch 1 patch, 1 patch, Topical, Daily, Burnis Fleet, DPM, 1 patch at 08/25/23 5012  •  LORazepam (ATIVAN) injection 0.5 mg, 0.5 mg, Intravenous, Q6H PRN, Burnis Fleet, DPM, 0.5 mg at 08/31/23 0208  •  methocarbamol (ROBAXIN) tablet 500 mg, 500 mg, Oral, Q12H PRN, Burnis Fleet, DPM, 500 mg at 08/25/23 1659  •  NIFEdipine (PROCARDIA XL) 24 hr tablet 30 mg, 30 mg, Oral, BID, Won Glasgowcheryl, DPM, 30 mg at 09/02/23 0850  •  ondansetron (ZOFRAN) injection 4 mg, 4 mg, Intravenous, Q6H PRN, Andre Ruths, DPM  •  oxyCODONE (ROXICODONE) IR tablet 5 mg, 5 mg, Oral, Q8H PRN, Andre Ruths, DPM, 5 mg at 08/25/23 2221  •  polyethylene glycol (MIRALAX) packet 17 g, 17 g, Oral, Daily, Arjun Barraza PA-C  •  senna-docusate sodium (SENOKOT S) 8.6-50 mg per tablet 2 tablet, 2 tablet, Oral, BID, Andre Rutjanelle, DPM, 2 tablet at 09/02/23 6478  •  sevelamer (RENAGEL) tablet 800 mg, 800 mg, Oral, TID With Meals, Andre Day, DPM, 800 mg at 09/01/23 1738  •  vancomycin (VANCOCIN) IVPB (premix in dextrose) 750 mg 150 mL, 750 mg, Intravenous, Q48H, Mira Rao PA-C    Laboratory Results:  Results from last 7 days   Lab Units 09/02/23  0555 09/01/23  0522 08/31/23  0450 08/30/23  0553 08/29/23  0915 08/29/23  0501 08/27/23  0455   WBC Thousand/uL 11.48* 14.32* 13.20* 14.70*  --  14.91* 10.02   HEMOGLOBIN g/dL 9.3* 9.3* 9.7* 9.3*  --  11.5* 9.9*   HEMATOCRIT % 29.7* 30.1* 31.1* 30.0*  --  36.8 31.4*   PLATELETS Thousands/uL 313 297 302 302  --  386 290   POTASSIUM mmol/L 4.3 5.0 4.5 4.2 4.0 6.2* 3.8   CHLORIDE mmol/L 97 98 101 100  --  95* 95*   CO2 mmol/L 28 25 25 25  --  22 27   BUN mg/dL 41* 31* 47* 38*  --  57* 36*   CREATININE mg/dL 7.03* 5.85* 7.76* 6.49*  --  7.97* 5.84*   CALCIUM mg/dL 8.0* 8.2* 8.3* 8.2*  --  8.9 7.8*   MAGNESIUM mg/dL  --   --  2.2  --   --   --  2.2   PHOSPHORUS mg/dL  --   --   --   --   --   --  2.7       PLEASE NOTE:  This encounter was completed utilizing the [employer] Direct Speech Voice Recognition Software. Grammatical errors, random word insertions, pronoun errors and incomplete sentences are occasional consequences of the system due to software limitations, ambient noise and hardware issues. These may be missed by proof reading prior to affixing electronic signature.  Any questions or concerns about the content, text or information contained within the body of this dictation should be directly addressed to the physician for clarification. Please do not hesitate to call me directly if you have any any questions or concerns.

## 2023-09-02 NOTE — PLAN OF CARE
Problem: Potential for Falls  Goal: Patient will remain free of falls  Description: INTERVENTIONS:  - Educate patient/family on patient safety including physical limitations  - Instruct patient to call for assistance with activity   - Consult OT/PT to assist with strengthening/mobility   - Keep Call bell within reach  - Keep bed low and locked with side rails adjusted as appropriate  - Keep care items and personal belongings within reach  - Initiate and maintain comfort rounds  - Make Fall Risk Sign visible to staff  - Apply yellow socks and bracelet for high fall risk patients  - Consider moving patient to room near nurses station  Outcome: Progressing     Problem: MOBILITY - ADULT  Goal: Maintain or return to baseline ADL function  Description: INTERVENTIONS:  - Educate patient/family on patient safety including physical limitations  - Instruct patient to call for assistance with activity   - Consult OT/PT to assist with strengthening/mobility   - Keep Call bell within reach  - Keep bed low and locked with side rails adjusted as appropriate  - Keep care items and personal belongings within reach  - Initiate and maintain comfort rounds  - Make Fall Risk Sign visible to staff  - Apply yellow socks and bracelet for high fall risk patients  - Consider moving patient to room near nurses station  Outcome: Progressing  Goal: Maintains/Returns to pre admission functional level  Description: INTERVENTIONS:  - Perform BMAT or MOVE assessment daily.   - Set and communicate daily mobility goal to care team and patient/family/caregiver. - Collaborate with rehabilitation services on mobility goals if consulted  - Perform Range of Motion 3 times a day. - Reposition patient every 3 hours.   - Dangle patient 3 times a day  - Stand patient 3 times a day  - Ambulate patient 3 times a day  - Out of bed to chair 3 times a day   - Out of bed for meals 3 times a day  - Out of bed for toileting  - Record patient progress and toleration of activity level   Outcome: Progressing

## 2023-09-02 NOTE — PLAN OF CARE
Pt on HD treatment for 3.5 hours with a UF goal of 1-2 L as tolerated. Pt on a 3 k 2.5 basim bath for a potassium of 4.3 on 09/02/23.   Problem: METABOLIC, FLUID AND ELECTROLYTES - ADULT  Goal: Electrolytes maintained within normal limits  Description: INTERVENTIONS:  - Monitor labs and assess patient for signs and symptoms of electrolyte imbalances  - Administer electrolyte replacement as ordered  - Monitor response to electrolyte replacements, including repeat lab results as appropriate  - Instruct patient on fluid and nutrition as appropriate  Outcome: Progressing  Goal: Fluid balance maintained  Description: INTERVENTIONS:  - Monitor labs   - Monitor I/O and WT  - Instruct patient on fluid and nutrition as appropriate  - Assess for signs & symptoms of volume excess or deficit  Outcome: Progressing

## 2023-09-02 NOTE — PROGRESS NOTES
Podiatry - Progress Note  Patient: Concepcion Lorenzo 58 y.o. male   MRN: [id number]  PCP: Jens Avalos MD  Unit/Bed#: 9[address] [facility] 421-01 Encounter: [id number]  Date Of Visit: 23    ASSESSMENT:    Concepcion Lorenzo is a 58 y.o. male with:    1. Right submet 1 ulceration probing to bone with underlying osteomyelitis with septic arthritis - [place] 3  1. S/p partial 1st ray resection with application of wound VAC  2. Left submet 1 callus  3. T2DM with peripheral neuropathy  4. ESRD    PLAN:    · Wound VAC changed at bedside, wound appears stable with no local signs of infection. · Plan for wound VAC change 23 and possible delayed primary closure timing TBD  · Reviewed post-op XR from 23: expected post-operative changes  · F/u intra-op wound cultures  · Elevation and offloading on green foam wedges or pillows when non-ambulatory. · Rest of care per primary team.    Wound VAC application  1. Number of sponges: 2  2. Pressure settin continuous  3. Size of wound: 3.5 x 2.9 x 2.5 cm  4. Description of wound: A Verbal time out was performed confirming correct patient and extremity. Weightbearing status: Non-weightbearing right foot    SUBJECTIVE:     The patient was seen, evaluated, and assessed at bedside today. The patient was awake, alert, and in no acute distress. No acute events overnight. The patient reports no pain to his right foot at this time. Patient denies N/V/F/chills/SOB/CP. OBJECTIVE:     Vitals:   /66   Pulse 62   Temp 97.6 °F (36.4 °C) (Tympanic)   Resp 14   Ht 6' 1" (1.854 m)   Wt 115 kg (253 lb)   SpO2 98%   BMI 33.38 kg/m²     Temp (24hrs), Av.4 °F (36.3 °C), Min:97 °F (36.1 °C), Max:97.6 °F (36.4 °C)      Physical Exam:     General: Alert, cooperative and no distress  Lungs: Non labored breathing  Abdomen: Soft, non-tender. Lower extremity exam:  Pedal pulses intact. Light touch is absent. Pain on palpation negative. No calf tenderness noted.      Lower extremity wound(s) as noted below:  Wound #: 1  Location: right plantar medial foot  Length 2.9cm: Width 3.5cm: Depth 2.5cm:   Deepest Tissue Noted in Base: muscle  Probe to Bone: No  Peripheral Skin Description: Attached  Granulation: 100% Fibrotic Tissue: 0% Necrotic Tissue: 0%   Drainage Amount: moderate, bloody  Signs of Infection: No    Surrounding incision site is stable with skin edges well approximated. No dehiscence or maceration noted. No local signs of infection. Additional Data:     Labs:    Results from last 7 days   Lab Units 09/02/23  0555   WBC Thousand/uL 11.48*   HEMOGLOBIN g/dL 9.3*   HEMATOCRIT % 29.7*   PLATELETS Thousands/uL 313     Results from last 7 days   Lab Units 09/02/23  0555   POTASSIUM mmol/L 4.3   CHLORIDE mmol/L 97   CO2 mmol/L 28   BUN mg/dL 41*   CREATININE mg/dL 7.03*   CALCIUM mg/dL 8.0*           * I Have Reviewed All Lab Data Listed Above. Recent Cultures (last 7 days):     Results from last 7 days   Lab Units 08/31/23  1739 08/31/23  1738   GRAM STAIN RESULT  No Polys or Bacteria seen  Rare Epithelial Cells No Polys or Bacteria seen   WOUND CULTURE  No growth No growth     Results from last 7 days   Lab Units 08/31/23  1739 08/31/23  1738   ANAEROBIC CULTURE  Culture results to follow. Culture results to follow. Imaging: I have personally reviewed pertinent films in PACS  EKG, Pathology, and Other Studies: I have personally reviewed pertinent reports. ** Please Note: Portions of the record may have been created with voice recognition software. Occasional wrong word or "sound a like" substitutions may have occurred due to the inherent limitations of voice recognition software. Read the chart carefully and recognize, using context, where substitutions have occurred.  **

## 2023-09-02 NOTE — PROGRESS NOTES
[address]  Progress Note  Name: Estevan Elizondo  MRN: [id number]  Unit/Bed#: 913 [facility] 421-01 I Date of Admission: 8/19/2023   Date of Service: 9/2/2023 I Hospital Day: 14    Assessment/Plan   * Polymicrobial bacterial infection  Assessment & Plan  Polymicrobial bacteremia.  Enterobacter/Proteus/Streptococcus/Granulicatella  · Suspect cause of patient's initial metabolic encephalopathy which has improved with abx treatment  · ID following  · Repeat blood cultures negative  · D/c cefepime and flagyl following surgical cure  · Started on vancomycin 8/29 - continue through 9/6  · Afebrile  · Monitor fever curve and cbc    Diabetic ulcer of left midfoot associated with type 2 diabetes mellitus, limited to breakdown of skin [facility])  Assessment & Plan  Lab Results   Component Value Date    HGBA1C 6.6 (H) 07/07/2022       Recent Labs     09/01/23  1632 09/01/23  2234 09/02/23  0758 09/02/23  1115   POCGLU 138 169* 115 99       Blood Sugar Average: Last 72 hrs:  (P) 447.8219921653042004     Podiatry consulted for Delgadillo grade 1 ulcer submetatarsal 1 left foot with cellulitis and concern for septic joint and osteomyelitis on MRI  · S/P Debridement 8/21 and 8/24  · Noted to have elevated CRP, procalcitonin  · Wound culture 8/21 growing 1 + pseudomonas aeruginosa, 2 + enterobacter cloacae  · Wound culture 8/24 growing proteus, enterobacter, enterococcus  · MRI R foot: First submetatarsal head plantar skin ulceration with suspected draining sinus tract extending to the first metatarsal phalangeal joint suspicious for septic arthropathy  · Vascular consulted as imaging showing lower limb diffuse atherosclerotic disease noted throughout with elevated PSV of proximal popliteal artery and tibial peroneal trunk suggesting less than 50% stenosis, metatarsal pressure greater than 200, great toe pressure 95 mmHg within the healing range on right side.   Left lower limb showed diffuse atherosclerotic disease noted throughout with a 50 to 75% stenosis of the proximal popliteal artery, metatarsal pressure 170, great toe pressure 98 mmHg within healing range. · angiogram by IR  8/29/23  · Podiatry took patient to the OR for right first ray partial resection and application of wound VAC on 8/31  · s/p surgical dressing change today 9/2 and possible delayed primary closure on Tuesday    Status post fall  Assessment & Plan  Patient sustained a fall about 2 days prior to admission   · Etiology not clear-EKG was unremarkable. Blood sugar was 157. Questionable hypoglycemia versus accidental fall. Patient not exhibiting any signs of stroke. · Telemetry showed no evidence of arrhythmia  · Patient does not remember how he fell. Patient was found on the floor and could not get up. · PT/OT evaluation and treatment, recommending home with home health currently, however patient feels that he is deconditioned and is concerned about his status postoperatively.   Continue to follow  · CAT scan of the brain, CT chest abdomen pelvis, cervical spine and lumbar reconstructions study was ordered in the ED but patient refused the studies  · Patient got CT of the head and neck after receiving Ativan which was unremarkable  · Fall precautions    Hypertension  Assessment & Plan  · Continue Procardia 30 mg p.o. twice daily, Coreg 12.5 mg p.o. twice daily  · Vital signs as per unit routine    Type 2 diabetes mellitus, without long-term current use of insulin [facility])  Assessment & Plan  Lab Results   Component Value Date    HGBA1C 6.6 (H) 07/07/2022       Recent Labs     09/01/23  1632 09/01/23  2234 09/02/23  0758 09/02/23  1115   POCGLU 138 169* 115 99       Blood Sugar Average: Last 72 hrs:  (P) 960.5030434925902348   · Patient is on linagliptin at home  · Continue Humalog sliding scale with Accu-Cheks before every meal and at bedtime  · diabetic diet  · lantus 15 hs and lispro 5 units tid with meals    ESRD (end stage renal disease) [facility])  Assessment & Plan  Lab Results   Component Value Date    EGFR 7 09/02/2023    EGFR 9 09/01/2023    EGFR 6 08/31/2023    CREATININE 7.03 (H) 09/02/2023    CREATININE 5.85 (H) 09/01/2023    CREATININE 7.76 (H) 08/31/2023   · Patient is on Tuesday Thursday Saturday schedule at [address]  · IJ permcath removed, temporary  catheter placed 8/25  · Nephrology following  · Patient cleared by ID for permacath placement which he underwent on 8/30  · Hemodialysis on 8/31; dialysis nurse reported some difficulty with occlusion, administered Cathflo successfully and patient was able to complete his dialysis session without difficulties             VTE Pharmacologic Prophylaxis: VTE Score: 2 Moderate Risk (Score 3-4) - Pharmacological DVT Prophylaxis Ordered: heparin. Patient Centered Rounds: I performed bedside rounds with nursing staff today. Discussions with Specialists or Other Care Team Provider: podiatry, rn, cm    Education and Discussions with Family / Patient: Patient declined call to . Total Time Spent on Date of Encounter in care of patient: 35 minutes This time was spent on one or more of the following: performing physical exam; counseling and coordination of care; obtaining or reviewing history; documenting in the medical record; reviewing/ordering tests, medications or procedures; communicating with other healthcare professionals and discussing with patient's family/caregivers. Current Length of Stay: 14 day(s)  Current Patient Status: Inpatient   Certification Statement: The patient will continue to require additional inpatient hospital stay due to IV abx, podiatry intervention  Discharge Plan: Anticipate discharge in >72 hrs to rehab facility. Code Status: Level 1 - Full Code    Subjective:   Patient examined at bedside this morning during dialysis. He reports feeling well. Reports he does feel a little constipated. Last bowel movement was 2 days ago. Discussed starting MiraLAX. Denies fever, chills, chest pain, shortness of breath, nausea, vomiting, abdominal pain, or foot pain. Objective:     Vitals:   Temp (24hrs), Av.4 °F (36.3 °C), Min:97 °F (36.1 °C), Max:97.6 °F (36.4 °C)    Temp:  [97 °F (36.1 °C)-97.6 °F (36.4 °C)] 97.6 °F (36.4 °C)  HR:  [60-65] 62  Resp:  [14] 14  BP: (133-149)/(61-71) 141/66  SpO2:  [96 %-98 %] 98 %  Body mass index is 33.38 kg/m². Input and Output Summary (last 24 hours): Intake/Output Summary (Last 24 hours) at 2023 1150  Last data filed at 2023 1130  Gross per 24 hour   Intake 500 ml   Output 2000 ml   Net -1500 ml       Physical Exam:   Physical Exam  Vitals and nursing note reviewed. Constitutional:       General: He is not in acute distress. Appearance: He is well-developed. Cardiovascular:      Rate and Rhythm: Normal rate and regular rhythm. Pulmonary:      Effort: Pulmonary effort is normal.      Breath sounds: Normal breath sounds. Abdominal:      Palpations: Abdomen is soft. Tenderness: There is no abdominal tenderness. Musculoskeletal:         General: No swelling. Cervical back: Neck supple. Comments: R foot wrapped with wound vac in place   Skin:     General: Skin is warm and dry. Capillary Refill: Capillary refill takes less than 2 seconds. Neurological:      Mental Status: He is alert.    Psychiatric:         Mood and Affect: Mood normal.          Additional Data:     Labs:  Results from last 7 days   Lab Units 23  0555   WBC Thousand/uL 11.48*   HEMOGLOBIN g/dL 9.3*   HEMATOCRIT % 29.7*   PLATELETS Thousands/uL 313     Results from last 7 days   Lab Units 23  0555 23  0501 23  0455   SODIUM mmol/L 134*   < > 131*   POTASSIUM mmol/L 4.3   < > 3.8   CHLORIDE mmol/L 97   < > 95*   CO2 mmol/L 28   < > 27   BUN mg/dL 41*   < > 36*   CREATININE mg/dL 7.03*   < > 5.84*   ANION GAP mmol/L 9   < > 9   CALCIUM mg/dL 8.0*   < > 7.8*   ALBUMIN g/dL  --   --  3.1*   GLUCOSE RANDOM mg/dL 132   < > 140    < > = values in this interval not displayed. Results from last 7 days   Lab Units 09/02/23  1115 09/02/23  0758 09/01/23  2234 09/01/23  1632 09/01/23  1142 09/01/23  0732 08/31/23  2102 08/31/23  1855 08/31/23  1601 08/31/23  1109 08/31/23  0730 08/30/23  2152   POC GLUCOSE mg/dl 99 115 169* 138 151* 190* 172* 130 110 132 123 195*               Lines/Drains:  Invasive Devices     Peripheral Intravenous Line  Duration           Peripheral IV 08/30/23 Distal;Left;Ventral (anterior) Forearm 2 days          Hemodialysis Catheter  Duration           HD Permanent Double Catheter 3 days                      Imaging: No pertinent imaging reviewed.     Recent Cultures (last 7 days):   Results from last 7 days   Lab Units 08/31/23  1739 08/31/23  1738   GRAM STAIN RESULT  No Polys or Bacteria seen  Rare Epithelial Cells No Polys or Bacteria seen   WOUND CULTURE  No growth No growth       Last 24 Hours Medication List:   Current Facility-Administered Medications   Medication Dose Route Frequency Provider Last Rate   • acetaminophen  650 mg Oral Q6H PRN Justine Botello DPM     • acetaminophen  975 mg Oral Q8H Won Parmar DPM     • atorvastatin  80 mg Oral Daily Justine Botello DPM     • calcium carbonate  500 mg Oral TID PRN GRANT Rich     • carvedilol  12.5 mg Oral BID Justine Botello DPM     • famotidine  10 mg Oral Daily Justine Botello DPM     • gabapentin  200 mg Oral BID Justine Botello DPM     • haloperidol lactate  2 mg Intramuscular Q6H PRN Justine Botello DPM     • heparin (porcine)  5,000 Units Subcutaneous [facility] Justine Botello DPM     • HYDROmorphone  1 mg Intravenous Q4H PRN Justine Botello DPM     • insulin glargine  15 Units Subcutaneous HS Justine Botello DPM     • insulin lispro  1-5 Units Subcutaneous HS Won Parmar DPM     • insulin lispro  2-12 Units Subcutaneous TID AC Won Parmar DPM     • insulin lispro  5 Units Subcutaneous TID With Meals Justine Botello DPM     • lidocaine  1 patch Topical Daily Sherolyn Bellow, DPM     • LORazepam  0.5 mg Intravenous Q6H PRN Sherolyn Bellow, DPM     • methocarbamol  500 mg Oral Q12H PRN Sherolyn Bellow, DPM     • NIFEdipine  30 mg Oral BID Sherolyn Bellow, DPM     • ondansetron  4 mg Intravenous Q6H PRN Sherolyn Bellow, DPM     • oxyCODONE  5 mg Oral Q8H PRN Sherolyn Bellow, DPM     • polyethylene glycol  17 g Oral Daily Alexx Pozo PA-C     • senna-docusate sodium  2 tablet Oral BID Sherolyn Bellow, DPM     • sevelamer  800 mg Oral TID With Meals Sherque Bonillaow, DPM     • vancomycin  750 mg Intravenous Q48H Darlyn Tinoco PA-C          Today, Patient Was Seen By: Alexx Pozo PA-C    **Please Note: This note may have been constructed using a voice recognition system. **

## 2023-09-02 NOTE — ASSESSMENT & PLAN NOTE
Lab Results   Component Value Date    HGBA1C 6.6 (H) 07/07/2022       Recent Labs     09/01/23  1632 09/01/23  2234 09/02/23  0758 09/02/23  1115   POCGLU 138 169* 115 99       Blood Sugar Average: Last 72 hrs:  (P) 155.2382511044295039     Podiatry consulted for Delgadillo grade 1 ulcer submetatarsal 1 left foot with cellulitis and concern for septic joint and osteomyelitis on MRI  · S/P Debridement 8/21 and 8/24  · Noted to have elevated CRP, procalcitonin  · Wound culture 8/21 growing 1 + pseudomonas aeruginosa, 2 + enterobacter cloacae  · Wound culture 8/24 growing proteus, enterobacter, enterococcus  · MRI R foot: First submetatarsal head plantar skin ulceration with suspected draining sinus tract extending to the first metatarsal phalangeal joint suspicious for septic arthropathy  · Vascular consulted as imaging showing lower limb diffuse atherosclerotic disease noted throughout with elevated PSV of proximal popliteal artery and tibial peroneal trunk suggesting less than 50% stenosis, metatarsal pressure greater than 200, great toe pressure 95 mmHg within the healing range on right side. Left lower limb showed diffuse atherosclerotic disease noted throughout with a 50 to 75% stenosis of the proximal popliteal artery, metatarsal pressure 170, great toe pressure 98 mmHg within healing range.   · angiogram by IR  8/29/23  · Podiatry took patient to the OR for right first ray partial resection and application of wound VAC on 8/31  · s/p surgical dressing change today 9/2 and possible delayed primary closure on Tuesday

## 2023-09-02 NOTE — PROGRESS NOTES
Andres Adams is a 58 y.o. male who is currently ordered Vancomycin IV with management by the Pharmacy Consult service. Relevant clinical data and objective / subjective history reviewed. Vancomycin Assessment:  Indication and Goal AUC/Trough: Bone/joint infection (goal -600, trough >10), -600, trough >10  Clinical Status: worsening  Micro:     Renal Function:  SCr: 7.03 mg/dL  CrCl: 14.1 mL/min  Renal replacement: HD  Days of Therapy: 5  Current Dose: 1000mg IV on -Th-Sat trough 15-20  Vancomycin Plan:  New Dosinmg IV T--S trough 15-20  Estimated Trough: 15-20 mcg/mL  Next Level: 23 @ 0600  Renal Function Monitoring: Daily BMP and [place] will continue to follow closely for s/sx of nephrotoxicity, infusion reactions and appropriateness of therapy. BMP and CBC will be ordered per protocol. We will continue to follow the patient’s culture results and clinical progress daily.     Truong Rogers, Pharmacist

## 2023-09-02 NOTE — ASSESSMENT & PLAN NOTE
Lab Results   Component Value Date    EGFR 7 09/02/2023    EGFR 9 09/01/2023    EGFR 6 08/31/2023    CREATININE 7.03 (H) 09/02/2023    CREATININE 5.85 (H) 09/01/2023    CREATININE 7.76 (H) 08/31/2023   · Patient is on Tuesday Thursday Saturday schedule at [address]  · IJ permcath removed, temporary  catheter placed 8/25  · Nephrology following  · Patient cleared by ID for permacath placement which he underwent on 8/30  · Hemodialysis on 8/31; dialysis nurse reported some difficulty with occlusion, administered Cathflo successfully and patient was able to complete his dialysis session without difficulties

## 2023-09-02 NOTE — ASSESSMENT & PLAN NOTE
Lab Results   Component Value Date    HGBA1C 6.6 (H) 07/07/2022       Recent Labs     09/01/23  1632 09/01/23  2234 09/02/23  0758 09/02/23  1115   POCGLU 138 169* 115 99       Blood Sugar Average: Last 72 hrs:  (P) 849.6902861262342894   · Patient is on linagliptin at home  · Continue Humalog sliding scale with Accu-Cheks before every meal and at bedtime  · diabetic diet  · lantus 15 hs and lispro 5 units tid with meals

## 2023-09-03 LAB
ANION GAP SERPL CALCULATED.3IONS-SCNC: 9 MMOL/L
BACTERIA SPEC ANAEROBE CULT: NO GROWTH
BACTERIA SPEC ANAEROBE CULT: NORMAL
BACTERIA WND AEROBE CULT: ABNORMAL
BACTERIA WND AEROBE CULT: NO GROWTH
BUN SERPL-MCNC: 29 MG/DL (ref 5–25)
CALCIUM SERPL-MCNC: 8.7 MG/DL (ref 8.4–10.2)
CHLORIDE SERPL-SCNC: 93 MMOL/L (ref 96–108)
CO2 SERPL-SCNC: 32 MMOL/L (ref 21–32)
CREAT SERPL-MCNC: 5.23 MG/DL (ref 0.6–1.3)
ERYTHROCYTE [DISTWIDTH] IN BLOOD BY AUTOMATED COUNT: 17.5 % (ref 11.6–15.1)
GFR SERPL CREATININE-BSD FRML MDRD: 10 ML/MIN/1.73SQ M
GLUCOSE SERPL-MCNC: 126 MG/DL (ref 65–140)
GLUCOSE SERPL-MCNC: 127 MG/DL (ref 65–140)
GLUCOSE SERPL-MCNC: 143 MG/DL (ref 65–140)
GLUCOSE SERPL-MCNC: 167 MG/DL (ref 65–140)
GLUCOSE SERPL-MCNC: 96 MG/DL (ref 65–140)
GRAM STN SPEC: ABNORMAL
GRAM STN SPEC: ABNORMAL
GRAM STN SPEC: NORMAL
HCT VFR BLD AUTO: 32.3 % (ref 36.5–49.3)
HGB BLD-MCNC: 10.2 G/DL (ref 12–17)
MCH RBC QN AUTO: 28.9 PG (ref 26.8–34.3)
MCHC RBC AUTO-ENTMCNC: 31.6 G/DL (ref 31.4–37.4)
MCV RBC AUTO: 92 FL (ref 82–98)
PLATELET # BLD AUTO: 368 THOUSANDS/UL (ref 149–390)
PMV BLD AUTO: 9.5 FL (ref 8.9–12.7)
POTASSIUM SERPL-SCNC: 4.5 MMOL/L (ref 3.5–5.3)
RBC # BLD AUTO: 3.53 MILLION/UL (ref 3.88–5.62)
SODIUM SERPL-SCNC: 134 MMOL/L (ref 135–147)
WBC # BLD AUTO: 11.97 THOUSAND/UL (ref 4.31–10.16)

## 2023-09-03 PROCEDURE — 82948 REAGENT STRIP/BLOOD GLUCOSE: CPT

## 2023-09-03 PROCEDURE — 80048 BASIC METABOLIC PNL TOTAL CA: CPT

## 2023-09-03 PROCEDURE — 85027 COMPLETE CBC AUTOMATED: CPT

## 2023-09-03 PROCEDURE — 99232 SBSQ HOSP IP/OBS MODERATE 35: CPT

## 2023-09-03 RX ADMIN — NIFEDIPINE 30 MG: 30 TABLET, EXTENDED RELEASE ORAL at 17:35

## 2023-09-03 RX ADMIN — FAMOTIDINE 10 MG: 20 TABLET, FILM COATED ORAL at 08:50

## 2023-09-03 RX ADMIN — POLYETHYLENE GLYCOL 3350 17 G: 17 POWDER, FOR SOLUTION ORAL at 08:49

## 2023-09-03 RX ADMIN — CARVEDILOL 12.5 MG: 12.5 TABLET, FILM COATED ORAL at 08:50

## 2023-09-03 RX ADMIN — HEPARIN SODIUM 5000 UNITS: 5000 INJECTION INTRAVENOUS; SUBCUTANEOUS at 05:00

## 2023-09-03 RX ADMIN — GABAPENTIN 200 MG: 250 SUSPENSION ORAL at 17:38

## 2023-09-03 RX ADMIN — ATORVASTATIN CALCIUM 80 MG: 80 TABLET, FILM COATED ORAL at 17:35

## 2023-09-03 RX ADMIN — INSULIN LISPRO 2 UNITS: 100 INJECTION, SOLUTION INTRAVENOUS; SUBCUTANEOUS at 12:01

## 2023-09-03 RX ADMIN — GABAPENTIN 200 MG: 250 SUSPENSION ORAL at 08:53

## 2023-09-03 RX ADMIN — ANTACID TABLETS 500 MG: 500 TABLET, CHEWABLE ORAL at 00:29

## 2023-09-03 RX ADMIN — INSULIN LISPRO 5 UNITS: 100 INJECTION, SOLUTION INTRAVENOUS; SUBCUTANEOUS at 17:34

## 2023-09-03 RX ADMIN — CARVEDILOL 12.5 MG: 12.5 TABLET, FILM COATED ORAL at 17:37

## 2023-09-03 RX ADMIN — SENNOSIDES AND DOCUSATE SODIUM 2 TABLET: 50; 8.6 TABLET ORAL at 08:50

## 2023-09-03 RX ADMIN — SEVELAMER HYDROCHLORIDE 800 MG: 800 TABLET ORAL at 17:35

## 2023-09-03 RX ADMIN — HEPARIN SODIUM 5000 UNITS: 5000 INJECTION INTRAVENOUS; SUBCUTANEOUS at 21:48

## 2023-09-03 RX ADMIN — INSULIN LISPRO 5 UNITS: 100 INJECTION, SOLUTION INTRAVENOUS; SUBCUTANEOUS at 12:01

## 2023-09-03 RX ADMIN — INSULIN GLARGINE 15 UNITS: 100 INJECTION, SOLUTION SUBCUTANEOUS at 21:48

## 2023-09-03 RX ADMIN — NIFEDIPINE 30 MG: 30 TABLET, EXTENDED RELEASE ORAL at 08:50

## 2023-09-03 RX ADMIN — SEVELAMER HYDROCHLORIDE 800 MG: 800 TABLET ORAL at 08:53

## 2023-09-03 RX ADMIN — SEVELAMER HYDROCHLORIDE 800 MG: 800 TABLET ORAL at 12:00

## 2023-09-03 RX ADMIN — ONDANSETRON 4 MG: 2 INJECTION INTRAMUSCULAR; INTRAVENOUS at 04:41

## 2023-09-03 RX ADMIN — ANTACID TABLETS 1000 MG: 500 TABLET, CHEWABLE ORAL at 04:42

## 2023-09-03 RX ADMIN — HEPARIN SODIUM 5000 UNITS: 5000 INJECTION INTRAVENOUS; SUBCUTANEOUS at 13:08

## 2023-09-03 RX ADMIN — ACETAMINOPHEN 975 MG: 325 TABLET ORAL at 05:00

## 2023-09-03 RX ADMIN — INSULIN LISPRO 5 UNITS: 100 INJECTION, SOLUTION INTRAVENOUS; SUBCUTANEOUS at 08:51

## 2023-09-03 RX ADMIN — SENNOSIDES AND DOCUSATE SODIUM 2 TABLET: 50; 8.6 TABLET ORAL at 17:34

## 2023-09-03 NOTE — PLAN OF CARE
Problem: Potential for Falls  Goal: Patient will remain free of falls  Description: INTERVENTIONS:  - Educate patient/family on patient safety including physical limitations  - Instruct patient to call for assistance with activity   - Consult OT/PT to assist with strengthening/mobility   - Keep Call bell within reach  - Keep bed low and locked with side rails adjusted as appropriate  - Keep care items and personal belongings within reach  - Initiate and maintain comfort rounds  - Make Fall Risk Sign visible to staff  - Apply yellow socks and bracelet for high fall risk patients  - Consider moving patient to room near nurses station  Outcome: Progressing     Problem: MOBILITY - ADULT  Goal: Maintain or return to baseline ADL function  Description: INTERVENTIONS:  - Educate patient/family on patient safety including physical limitations  - Instruct patient to call for assistance with activity   - Consult OT/PT to assist with strengthening/mobility   - Keep Call bell within reach  - Keep bed low and locked with side rails adjusted as appropriate  - Keep care items and personal belongings within reach  - Initiate and maintain comfort rounds  - Make Fall Risk Sign visible to staff  - Apply yellow socks and bracelet for high fall risk patients  - Consider moving patient to room near nurses station  Outcome: Progressing  Goal: Maintains/Returns to pre admission functional level  Description: INTERVENTIONS:  - Perform BMAT or MOVE assessment daily.   - Set and communicate daily mobility goal to care team and patient/family/caregiver. - Collaborate with rehabilitation services on mobility goals if consulted  - Perform Range of Motion 3 times a day. - Reposition patient every 2 hours.   - Dangle patient 3 times a day  - Stand patient 3 times a day  - Ambulate patient 3 times a day  - Out of bed to chair 3 times a day   - Out of bed for meals 3 times a day  - Out of bed for toileting  - Record patient progress and toleration of activity level   Outcome: Progressing     Problem: PAIN - ADULT  Goal: Verbalizes/displays adequate comfort level or baseline comfort level  Description: Interventions:  - Encourage patient to monitor pain and request assistance  - Assess pain using appropriate pain scale  - Administer analgesics based on type and severity of pain and evaluate response  - Implement non-pharmacological measures as appropriate and evaluate response  - Consider cultural and social influences on pain and pain management  - Notify physician/advanced practitioner if interventions unsuccessful or patient reports new pain  Outcome: Progressing     Problem: INFECTION - ADULT  Goal: Absence or prevention of progression during hospitalization  Description: INTERVENTIONS:  - Assess and monitor for signs and symptoms of infection  - Monitor lab/diagnostic results  - Monitor all insertion sites, i.e. indwelling lines, tubes, and drains  - Monitor endotracheal if appropriate and nasal secretions for changes in amount and color  - [place] appropriate cooling/warming therapies per order  - Administer medications as ordered  - Instruct and encourage patient and family to use good hand hygiene technique  - Identify and instruct in appropriate isolation precautions for identified infection/condition  Outcome: Progressing  Goal: Absence of fever/infection during neutropenic period  Description: INTERVENTIONS:  - Monitor WBC    Outcome: Progressing     Problem: SAFETY ADULT  Goal: Patient will remain free of falls  Description: INTERVENTIONS:  - Educate patient/family on patient safety including physical limitations  - Instruct patient to call for assistance with activity   - Consult OT/PT to assist with strengthening/mobility   - Keep Call bell within reach  - Keep bed low and locked with side rails adjusted as appropriate  - Keep care items and personal belongings within reach  - Initiate and maintain comfort rounds  - Make Fall Risk Sign visible to staff  - Apply yellow socks and bracelet for high fall risk patients  - Consider moving patient to room near nurses station  Outcome: Progressing  Goal: Maintain or return to baseline ADL function  Description: INTERVENTIONS:  - Educate patient/family on patient safety including physical limitations  - Instruct patient to call for assistance with activity   - Consult OT/PT to assist with strengthening/mobility   - Keep Call bell within reach  - Keep bed low and locked with side rails adjusted as appropriate  - Keep care items and personal belongings within reach  - Initiate and maintain comfort rounds  - Make Fall Risk Sign visible to staff  - Apply yellow socks and bracelet for high fall risk patients  - Consider moving patient to room near nurses station  Outcome: Progressing  Goal: Maintains/Returns to pre admission functional level  Description: INTERVENTIONS:  - Perform BMAT or MOVE assessment daily.   - Set and communicate daily mobility goal to care team and patient/family/caregiver. - Collaborate with rehabilitation services on mobility goals if consulted  - Perform Range of Motion 3 times a day. - Reposition patient every 2 hours.   - Dangle patient 3 times a day  - Stand patient 3 times a day  - Ambulate patient 3 times a day  - Out of bed to chair 3 times a day   - Out of bed for meals 3 times a day  - Out of bed for toileting  - Record patient progress and toleration of activity level   Outcome: Progressing     Problem: DISCHARGE PLANNING  Goal: Discharge to home or other facility with appropriate resources  Description: INTERVENTIONS:  - Identify barriers to discharge w/patient and caregiver  - Arrange for needed discharge resources and transportation as appropriate  - Identify discharge learning needs (meds, wound care, etc.)  - Arrange for interpretive services to assist at discharge as needed  - Refer to Case Management Department for coordinating discharge planning if the patient needs post-hospital services based on physician/advanced practitioner order or complex needs related to functional status, cognitive ability, or social support system  Outcome: Progressing     Problem: Knowledge Deficit  Goal: Patient/family/caregiver demonstrates understanding of disease process, treatment plan, medications, and discharge instructions  Description: Complete learning assessment and assess knowledge base. Interventions:  - Provide teaching at level of understanding  - Provide teaching via preferred learning methods  Outcome: Progressing     Problem: METABOLIC, FLUID AND ELECTROLYTES - ADULT  Goal: Electrolytes maintained within normal limits  Description: INTERVENTIONS:  - Monitor labs and assess patient for signs and symptoms of electrolyte imbalances  - Administer electrolyte replacement as ordered  - Monitor response to electrolyte replacements, including repeat lab results as appropriate  - Instruct patient on fluid and nutrition as appropriate  Outcome: Progressing  Goal: Fluid balance maintained  Description: INTERVENTIONS:  - Monitor labs   - Monitor I/O and WT  - Instruct patient on fluid and nutrition as appropriate  - Assess for signs & symptoms of volume excess or deficit  Outcome: Progressing     Problem: Nutrition/Hydration-ADULT  Goal: Nutrient/Hydration intake appropriate for improving, restoring or maintaining nutritional needs  Description: Monitor and assess patient's nutrition/hydration status for malnutrition. Collaborate with interdisciplinary team and initiate plan and interventions as ordered. Monitor patient's weight and dietary intake as ordered or per policy. Utilize nutrition screening tool and intervene as necessary. Determine patient's food preferences and provide high-protein, high-caloric foods as appropriate.      INTERVENTIONS:  - Monitor oral intake, urinary output, labs, and treatment plans  - Assess nutrition and hydration status and recommend course of action  - Evaluate amount of meals eaten  - Assist patient with eating if necessary   - Allow adequate time for meals  - Recommend/ encourage appropriate diets, oral nutritional supplements, and vitamin/mineral supplements  - Order, calculate, and assess calorie counts as needed  - Assess need for intravenous fluids  - Provide specific nutrition/hydration education as appropriate  - Include patient/family/caregiver in decisions related to nutrition  Outcome: Progressing     Problem: SAFETY,RESTRAINT: NV/NON-SELF DESTRUCTIVE BEHAVIOR  Goal: Remains free of harm/injury (restraint for non violent/non self-detsructive behavior)  Description: INTERVENTIONS:  - Instruct patient/family regarding restraint use   - Assess and monitor physiologic and psychological status   - Provide interventions and comfort measures to meet assessed patient needs   - Identify and implement measures to help patient regain control  - Assess readiness for release of restraint   Outcome: Progressing  Goal: Returns to optimal restraint-free functioning  Description: INTERVENTIONS:  - Assess the patient's behavior and symptoms that indicate continued need for restraint  - Identify and implement measures to help patient regain control  - Assess readiness for release of restraint   Outcome: Progressing     Problem: Prexisting or High Potential for Compromised Skin Integrity  Goal: Skin integrity is maintained or improved  Description: INTERVENTIONS:  - Identify patients at risk for skin breakdown  - Assess and monitor skin integrity  - Assess and monitor nutrition and hydration status  - Monitor labs   - Assess for incontinence   - Turn and reposition patient  - Assist with mobility/ambulation  - Relieve pressure over bony prominences  - Avoid friction and shearing  - Provide appropriate hygiene as needed including keeping skin clean and dry  - Evaluate need for skin moisturizer/barrier cream  - Collaborate with interdisciplinary team   - Patient/family teaching  - Consider wound care consult   Outcome: Progressing

## 2023-09-03 NOTE — PROGRESS NOTES
Jody Shelley is a 58 y.o. male who is currently ordered Vancomycin IV with management by the Pharmacy Consult service. Relevant clinical data and objective / subjective history reviewed. Vancomycin Assessment:  Indication and Goal AUC/Trough: Bone/joint infection (goal -600, trough >10), -600, trough >10  Clinical Status: stable  Micro:     Renal Function:  SCr: 5.23 mg/dL  CrCl: 19 mL/min  Renal replacement: HD  Days of Therapy: 6  Current Dose: 750 mg IV T-TH-SA level 15-20  Vancomycin Plan:  Continue current dose    Next Level: 06:00 9/5/23  Renal Function Monitoring: Daily BMP and [place] will continue to follow closely for s/sx of nephrotoxicity, infusion reactions and appropriateness of therapy. BMP and CBC will be ordered per protocol. We will continue to follow the patient’s culture results and clinical progress daily.     Candace Robles, Pharmacist

## 2023-09-03 NOTE — ASSESSMENT & PLAN NOTE
Lab Results   Component Value Date    HGBA1C 6.6 (H) 07/07/2022       Recent Labs     09/02/23  1115 09/02/23  1652 09/02/23  2151 09/03/23  0721   POCGLU 99 132 149* 126       Blood Sugar Average: Last 72 hrs:  (P) 805.4819661624112601     Podiatry consulted for Bernell Flatter grade 1 ulcer submetatarsal 1 left foot with cellulitis and concern for septic joint and osteomyelitis on MRI  · S/P Debridement 8/21 and 8/24  · Noted to have elevated CRP, procalcitonin  · Wound culture 8/21 growing 1 + pseudomonas aeruginosa, 2 + enterobacter cloacae  · Wound culture 8/24 growing proteus, enterobacter, enterococcus  · MRI R foot: First submetatarsal head plantar skin ulceration with suspected draining sinus tract extending to the first metatarsal phalangeal joint suspicious for septic arthropathy  · Vascular consulted as imaging showing lower limb diffuse atherosclerotic disease noted throughout with elevated PSV of proximal popliteal artery and tibial peroneal trunk suggesting less than 50% stenosis, metatarsal pressure greater than 200, great toe pressure 95 mmHg within the healing range on right side. Left lower limb showed diffuse atherosclerotic disease noted throughout with a 50 to 75% stenosis of the proximal popliteal artery, metatarsal pressure 170, great toe pressure 98 mmHg within healing range.   · angiogram by IR  8/29/23  · s/p right first ray partial resection and application of wound VAC on 8/31  · S/p wound vac change 9/2  · Plan for wound vac change 9/5 and possible delayed primary closure by podiatry

## 2023-09-03 NOTE — ASSESSMENT & PLAN NOTE
Lab Results   Component Value Date    HGBA1C 6.6 (H) 07/07/2022       Recent Labs     09/02/23  1115 09/02/23  1652 09/02/23  2151 09/03/23  0721   POCGLU 99 132 149* 126       Blood Sugar Average: Last 72 hrs:  (P) 138.0257499720426826   · Patient is on linagliptin at home  · Continue Humalog sliding scale with Accu-Cheks before every meal and at bedtime  · diabetic diet  · lantus 15 hs and lispro 5 units tid with meals

## 2023-09-03 NOTE — ASSESSMENT & PLAN NOTE
Lab Results   Component Value Date    EGFR 10 09/03/2023    EGFR 7 09/02/2023    EGFR 9 09/01/2023    CREATININE 5.23 (H) 09/03/2023    CREATININE 7.03 (H) 09/02/2023    CREATININE 5.85 (H) 09/01/2023   · Patient is on Tuesday Thursday Saturday schedule at [facility]  · IJ permcath removed, temporary  catheter placed 8/25  · Nephrology following  · Patient cleared by ID for permacath placement which he underwent on 8/30

## 2023-09-03 NOTE — PROGRESS NOTES
[id number] [facility]  Progress Note  Name: Regina Medrano  MRN: [id number]  Unit/Bed#: 913 [facility] 421-01 I Date of Admission: 8/19/2023   Date of Service: 9/3/2023 I Hospital Day: 15    Assessment/Plan   * Polymicrobial bacterial infection  Assessment & Plan  Polymicrobial bacteremia.  Enterobacter/Proteus/Streptococcus/Granulicatella  · Suspect cause of patient's initial metabolic encephalopathy which has improved with abx treatment  · ID following  · Repeat blood cultures negative  · D/c cefepime and flagyl following surgical cure  · Started on vancomycin 8/29 - continue through 9/6  · Afebrile  · Monitor fever curve and cbc    Diabetic ulcer of left midfoot associated with type 2 diabetes mellitus, limited to breakdown of skin [facility])  Assessment & Plan  Lab Results   Component Value Date    HGBA1C 6.6 (H) 07/07/2022       Recent Labs     09/02/23  1115 09/02/23  1652 09/02/23  2151 09/03/23  0721   POCGLU 99 132 149* 126       Blood Sugar Average: Last 72 hrs:  (P) 594.7079525843102013     Podiatry consulted for [place] grade 1 ulcer submetatarsal 1 left foot with cellulitis and concern for septic joint and osteomyelitis on MRI  · S/P Debridement 8/21 and 8/24  · Noted to have elevated CRP, procalcitonin  · Wound culture 8/21 growing 1 + pseudomonas aeruginosa, 2 + enterobacter cloacae  · Wound culture 8/24 growing proteus, enterobacter, enterococcus  · MRI R foot: First submetatarsal head plantar skin ulceration with suspected draining sinus tract extending to the first metatarsal phalangeal joint suspicious for septic arthropathy  · Vascular consulted as imaging showing lower limb diffuse atherosclerotic disease noted throughout with elevated PSV of proximal popliteal artery and tibial peroneal trunk suggesting less than 50% stenosis, metatarsal pressure greater than 200, great toe pressure 95 mmHg within the healing range on right side.   Left lower limb showed diffuse atherosclerotic disease noted throughout with a 50 to 75% stenosis of the proximal popliteal artery, metatarsal pressure 170, great toe pressure 98 mmHg within healing range. · angiogram by IR  8/29/23  · s/p right first ray partial resection and application of wound VAC on 8/31  · S/p wound vac change 9/2  · Plan for wound vac change 9/5 and possible delayed primary closure by podiatry    Status post fall  Assessment & Plan  Patient sustained a fall about 2 days prior to admission   · Etiology not clear-EKG was unremarkable. Blood sugar was 157. Questionable hypoglycemia versus accidental fall. Patient not exhibiting any signs of stroke. · Telemetry showed no evidence of arrhythmia  · Patient does not remember how he fell. Patient was found on the floor and could not get up. · PT/OT evaluation and treatment, recommending home with home health currently, however patient feels that he is deconditioned and is concerned about his status postoperatively.   Continue to follow  · CAT scan of the brain, CT chest abdomen pelvis, cervical spine and lumbar reconstructions study was ordered in the ED but patient refused the studies  · Patient got CT of the head and neck after receiving Ativan which was unremarkable  · Fall precautions    Hypertension  Assessment & Plan  · Continue Procardia 30 mg p.o. twice daily, Coreg 12.5 mg p.o. twice daily  · Vital signs as per unit routine    Type 2 diabetes mellitus, without long-term current use of insulin [facility])  Assessment & Plan  Lab Results   Component Value Date    HGBA1C 6.6 (H) 07/07/2022       Recent Labs     09/02/23  1115 09/02/23  1652 09/02/23  2151 09/03/23  0721   POCGLU 99 132 149* 126       Blood Sugar Average: Last 72 hrs:  (P) 138.5651246819144601   · Patient is on linagliptin at home  · Continue Humalog sliding scale with Accu-Cheks before every meal and at bedtime  · diabetic diet  · lantus 15 hs and lispro 5 units tid with meals    ESRD (end stage renal disease) ([address])  Assessment & Plan  Lab Results   Component Value Date    EGFR 10 2023    EGFR 7 2023    EGFR 9 2023    CREATININE 5.23 (H) 2023    CREATININE 7.03 (H) 2023    CREATININE 5.85 (H) 2023   · Patient is on  schedule at [address]  · IJ permcath removed, temporary  catheter placed   · Nephrology following  · Patient cleared by ID for permacath placement which he underwent on            VTE Pharmacologic Prophylaxis: VTE Score: 2 Moderate Risk (Score 3-4) - Pharmacological DVT Prophylaxis Ordered: heparin. Patient Centered Rounds: I performed bedside rounds with nursing staff today. Discussions with Specialists or Other Care Team Provider: podiatry, rn    Education and Discussions with Family / Patient: Patient declined call to . Total Time Spent on Date of Encounter in care of patient: 25 minutes This time was spent on one or more of the following: performing physical exam; counseling and coordination of care; obtaining or reviewing history; documenting in the medical record; reviewing/ordering tests, medications or procedures; communicating with other healthcare professionals and discussing with patient's family/caregivers. Current Length of Stay: 15 day(s)  Current Patient Status: Inpatient   Certification Statement: The patient will continue to require additional inpatient hospital stay due to wound vac change, IV abx, possible closure in OR  Discharge Plan: Anticipate discharge in 48-72 hrs to rehab facility. Code Status: Level 1 - Full Code    Subjective:   Patient reports feeling well. Denies any chest pain, shortness of breath, nausea, abdominal pain, or foot pain. Last bowel movement two days ago.     Objective:     Vitals:   Temp (24hrs), Av.7 °F (36.5 °C), Min:97.4 °F (36.3 °C), Max:98.1 °F (36.7 °C)    Temp:  [97.4 °F (36.3 °C)-98.1 °F (36.7 °C)] 97.6 °F (36.4 °C)  HR:  [59-68] 68  Resp:  [14-18] 18  BP: ()/() 109/56  SpO2:  [92 %-98 %] 94 %  Body mass index is 33.38 kg/m². Input and Output Summary (last 24 hours): Intake/Output Summary (Last 24 hours) at 9/3/2023 0843  Last data filed at 9/2/2023 2326  Gross per 24 hour   Intake 510 ml   Output 2000 ml   Net -1490 ml       Physical Exam:   Physical Exam  Vitals and nursing note reviewed. Constitutional:       General: He is not in acute distress. Appearance: He is well-developed. Cardiovascular:      Rate and Rhythm: Normal rate and regular rhythm. Heart sounds: No murmur heard. Pulmonary:      Effort: Pulmonary effort is normal. No respiratory distress. Breath sounds: Normal breath sounds. Abdominal:      Palpations: Abdomen is soft. Tenderness: There is no abdominal tenderness. Musculoskeletal:         General: No swelling. Comments: Wound vac in place to right foot   Skin:     General: Skin is warm and dry. Capillary Refill: Capillary refill takes less than 2 seconds. Neurological:      Mental Status: He is alert.    Psychiatric:         Mood and Affect: Mood normal.          Additional Data:     Labs:  Results from last 7 days   Lab Units 09/03/23  0456   WBC Thousand/uL 11.97*   HEMOGLOBIN g/dL 10.2*   HEMATOCRIT % 32.3*   PLATELETS Thousands/uL 368     Results from last 7 days   Lab Units 09/03/23  0456   SODIUM mmol/L 134*   POTASSIUM mmol/L 4.5   CHLORIDE mmol/L 93*   CO2 mmol/L 32   BUN mg/dL 29*   CREATININE mg/dL 5.23*   ANION GAP mmol/L 9   CALCIUM mg/dL 8.7   GLUCOSE RANDOM mg/dL 127         Results from last 7 days   Lab Units 09/03/23  0721 09/02/23  2151 09/02/23  1652 09/02/23  1115 09/02/23  0758 09/01/23  2234 09/01/23  1632 09/01/23  1142 09/01/23  0732 08/31/23  2102 08/31/23  1855 08/31/23  1601   POC GLUCOSE mg/dl 126 149* 132 99 115 169* 138 151* 190* 172* 130 110               Lines/Drains:  Invasive Devices     Peripheral Intravenous Line  Duration           Peripheral IV 09/02/23 Distal;Left;Upper;Ventral (anterior) Arm <1 day          Hemodialysis Catheter  Duration           HD Permanent Double Catheter 3 days                      Imaging: No pertinent imaging reviewed.     Recent Cultures (last 7 days):   Results from last 7 days   Lab Units 08/31/23  1739 08/31/23  1738   GRAM STAIN RESULT  No Polys or Bacteria seen  Rare Epithelial Cells No Polys or Bacteria seen   WOUND CULTURE  No growth No growth       Last 24 Hours Medication List:   Current Facility-Administered Medications   Medication Dose Route Frequency Provider Last Rate   • acetaminophen  650 mg Oral Q6H PRN Burnis Fleet, DPM     • acetaminophen  975 mg Oral Q8H Won Parmar, DPM     • atorvastatin  80 mg Oral Daily Burnis Fleet, DPM     • calcium carbonate  1,000 mg Oral BID PRN GRANT Rich     • carvedilol  12.5 mg Oral BID Burnis Fleet, DPM     • famotidine  10 mg Oral Daily Burnis Fleet, DPM     • gabapentin  200 mg Oral BID Burnis Fleet, DPM     • haloperidol lactate  2 mg Intramuscular Q6H PRN Burnis Fleet, DPM     • heparin (porcine)  5,000 Units Subcutaneous [facility] Burnis Fleet, DPM     • HYDROmorphone  1 mg Intravenous Q4H PRN Burnis Fleet, DPM     • insulin glargine  15 Units Subcutaneous HS Burnis Fleet, DPM     • insulin lispro  1-5 Units Subcutaneous HS Won Parmar, DPTRICE     • insulin lispro  2-12 Units Subcutaneous TID AC Won Parmar DPM     • insulin lispro  5 Units Subcutaneous TID With Meals Burnis Fleet, DPM     • lidocaine  1 patch Topical Daily Burnis Fleet, DPM     • LORazepam  0.5 mg Intravenous Q6H PRN Burnis Fleet, DPM     • methocarbamol  500 mg Oral Q12H PRN Burnis Fleet, DPM     • NIFEdipine  30 mg Oral BID Burnis Fleet, DPM     • ondansetron  4 mg Intravenous Q6H PRN Burnis Fleet, DPM     • oxyCODONE  5 mg Oral Q8H PRN Burnis Fleet, DPM     • polyethylene glycol  17 g Oral Daily Yvette Arango PA-C     • senna-docusate sodium  2 tablet Oral BID Burnis Fleet, DPM     • sevelamer  800 mg Oral TID With Meals Burnis EUGENIA Curry     • [START ON 9/5/2023] vancomycin  750 mg Intravenous Once per day on Tue Thu Sat Kimberlyn Mon DO          Today, Patient Was Seen By: Yvette Arango PA-C    **Please Note: This note may have been constructed using a voice recognition system. **

## 2023-09-04 LAB
ANION GAP SERPL CALCULATED.3IONS-SCNC: 9 MMOL/L
BUN SERPL-MCNC: 44 MG/DL (ref 5–25)
CALCIUM SERPL-MCNC: 8.5 MG/DL (ref 8.4–10.2)
CHLORIDE SERPL-SCNC: 94 MMOL/L (ref 96–108)
CO2 SERPL-SCNC: 30 MMOL/L (ref 21–32)
CREAT SERPL-MCNC: 6.61 MG/DL (ref 0.6–1.3)
ERYTHROCYTE [DISTWIDTH] IN BLOOD BY AUTOMATED COUNT: 17.5 % (ref 11.6–15.1)
GFR SERPL CREATININE-BSD FRML MDRD: 8 ML/MIN/1.73SQ M
GLUCOSE SERPL-MCNC: 120 MG/DL (ref 65–140)
GLUCOSE SERPL-MCNC: 121 MG/DL (ref 65–140)
GLUCOSE SERPL-MCNC: 125 MG/DL (ref 65–140)
GLUCOSE SERPL-MCNC: 132 MG/DL (ref 65–140)
GLUCOSE SERPL-MCNC: 157 MG/DL (ref 65–140)
HCT VFR BLD AUTO: 28.7 % (ref 36.5–49.3)
HGB BLD-MCNC: 9.1 G/DL (ref 12–17)
MCH RBC QN AUTO: 29.6 PG (ref 26.8–34.3)
MCHC RBC AUTO-ENTMCNC: 31.7 G/DL (ref 31.4–37.4)
MCV RBC AUTO: 94 FL (ref 82–98)
PLATELET # BLD AUTO: 322 THOUSANDS/UL (ref 149–390)
PMV BLD AUTO: 9.6 FL (ref 8.9–12.7)
POTASSIUM SERPL-SCNC: 5.2 MMOL/L (ref 3.5–5.3)
RBC # BLD AUTO: 3.07 MILLION/UL (ref 3.88–5.62)
SODIUM SERPL-SCNC: 133 MMOL/L (ref 135–147)
WBC # BLD AUTO: 9.99 THOUSAND/UL (ref 4.31–10.16)

## 2023-09-04 PROCEDURE — 99223 1ST HOSP IP/OBS HIGH 75: CPT | Performed by: INTERNAL MEDICINE

## 2023-09-04 PROCEDURE — 82948 REAGENT STRIP/BLOOD GLUCOSE: CPT

## 2023-09-04 PROCEDURE — 85027 COMPLETE CBC AUTOMATED: CPT

## 2023-09-04 PROCEDURE — 97110 THERAPEUTIC EXERCISES: CPT | Performed by: PHYSICAL THERAPIST

## 2023-09-04 PROCEDURE — 80048 BASIC METABOLIC PNL TOTAL CA: CPT

## 2023-09-04 PROCEDURE — 99232 SBSQ HOSP IP/OBS MODERATE 35: CPT | Performed by: NURSE PRACTITIONER

## 2023-09-04 RX ADMIN — SENNOSIDES AND DOCUSATE SODIUM 2 TABLET: 50; 8.6 TABLET ORAL at 08:47

## 2023-09-04 RX ADMIN — POLYETHYLENE GLYCOL 3350 17 G: 17 POWDER, FOR SOLUTION ORAL at 08:48

## 2023-09-04 RX ADMIN — SENNOSIDES AND DOCUSATE SODIUM 2 TABLET: 50; 8.6 TABLET ORAL at 17:17

## 2023-09-04 RX ADMIN — FAMOTIDINE 10 MG: 20 TABLET, FILM COATED ORAL at 08:47

## 2023-09-04 RX ADMIN — SEVELAMER HYDROCHLORIDE 800 MG: 800 TABLET ORAL at 08:47

## 2023-09-04 RX ADMIN — GABAPENTIN 200 MG: 250 SUSPENSION ORAL at 08:52

## 2023-09-04 RX ADMIN — INSULIN LISPRO 2 UNITS: 100 INJECTION, SOLUTION INTRAVENOUS; SUBCUTANEOUS at 12:20

## 2023-09-04 RX ADMIN — INSULIN LISPRO 5 UNITS: 100 INJECTION, SOLUTION INTRAVENOUS; SUBCUTANEOUS at 08:48

## 2023-09-04 RX ADMIN — CARVEDILOL 12.5 MG: 12.5 TABLET, FILM COATED ORAL at 08:48

## 2023-09-04 RX ADMIN — NIFEDIPINE 30 MG: 30 TABLET, EXTENDED RELEASE ORAL at 08:48

## 2023-09-04 RX ADMIN — GABAPENTIN 200 MG: 250 SUSPENSION ORAL at 17:17

## 2023-09-04 RX ADMIN — INSULIN LISPRO 5 UNITS: 100 INJECTION, SOLUTION INTRAVENOUS; SUBCUTANEOUS at 17:16

## 2023-09-04 RX ADMIN — SEVELAMER HYDROCHLORIDE 800 MG: 800 TABLET ORAL at 12:20

## 2023-09-04 RX ADMIN — ANTACID TABLETS 1000 MG: 500 TABLET, CHEWABLE ORAL at 01:56

## 2023-09-04 RX ADMIN — INSULIN GLARGINE 15 UNITS: 100 INJECTION, SOLUTION SUBCUTANEOUS at 21:15

## 2023-09-04 RX ADMIN — ANTACID TABLETS 1000 MG: 500 TABLET, CHEWABLE ORAL at 22:35

## 2023-09-04 RX ADMIN — SEVELAMER HYDROCHLORIDE 800 MG: 800 TABLET ORAL at 17:17

## 2023-09-04 RX ADMIN — HEPARIN SODIUM 5000 UNITS: 5000 INJECTION INTRAVENOUS; SUBCUTANEOUS at 06:05

## 2023-09-04 RX ADMIN — INSULIN LISPRO 5 UNITS: 100 INJECTION, SOLUTION INTRAVENOUS; SUBCUTANEOUS at 12:20

## 2023-09-04 RX ADMIN — HEPARIN SODIUM 5000 UNITS: 5000 INJECTION INTRAVENOUS; SUBCUTANEOUS at 13:41

## 2023-09-04 RX ADMIN — HEPARIN SODIUM 5000 UNITS: 5000 INJECTION INTRAVENOUS; SUBCUTANEOUS at 21:15

## 2023-09-04 RX ADMIN — CARVEDILOL 12.5 MG: 12.5 TABLET, FILM COATED ORAL at 17:17

## 2023-09-04 RX ADMIN — ATORVASTATIN CALCIUM 80 MG: 80 TABLET, FILM COATED ORAL at 17:17

## 2023-09-04 RX ADMIN — NIFEDIPINE 30 MG: 30 TABLET, EXTENDED RELEASE ORAL at 17:17

## 2023-09-04 NOTE — PROGRESS NOTES
Yadira Arredondo is a 58 y.o. male who is currently ordered Vancomycin IV with management by the Pharmacy Consult service. Relevant clinical data and objective / subjective history reviewed. Vancomycin Assessment:  1. Indication and Goal AUC/Trough: Bone/joint infection (goal -600, trough >10), -600, trough >10  2. Clinical Status: stable  3. Micro:     4. Renal Function:  · SCr: 6.61 mg/dL  · CrCl: 15 mL/min  · Renal replacement: HD  5. Days of Therapy: 7  6. Current dose:  750mg IV every Tuesday, Thursday, Saturday after hemodialysis  Vancomycin Plan:     Continue current dose    1. Next Level: 06:00 9/5/23  2. Renal Function Monitoring: Daily BMP and [place] will continue to follow closely for s/sx of nephrotoxicity, infusion reactions and appropriateness of therapy. BMP and CBC will be ordered per protocol. We will continue to follow the patient’s culture results and clinical progress daily.     Humphrey Coulter, Pharmacist

## 2023-09-04 NOTE — PLAN OF CARE
Problem: Potential for Falls  Goal: Patient will remain free of falls  Description: INTERVENTIONS:  - Educate patient/family on patient safety including physical limitations  - Instruct patient to call for assistance with activity   - Consult OT/PT to assist with strengthening/mobility   - Keep Call bell within reach  - Keep bed low and locked with side rails adjusted as appropriate  - Keep care items and personal belongings within reach  - Initiate and maintain comfort rounds  - Make Fall Risk Sign visible to staff  - Apply yellow socks and bracelet for high fall risk patients  - Consider moving patient to room near nurses station  Outcome: Progressing     Problem: MOBILITY - ADULT  Goal: Maintain or return to baseline ADL function  Description: INTERVENTIONS:  - Educate patient/family on patient safety including physical limitations  - Instruct patient to call for assistance with activity   - Consult OT/PT to assist with strengthening/mobility   - Keep Call bell within reach  - Keep bed low and locked with side rails adjusted as appropriate  - Keep care items and personal belongings within reach  - Initiate and maintain comfort rounds  - Make Fall Risk Sign visible to staff  - Apply yellow socks and bracelet for high fall risk patients  - Consider moving patient to room near nurses station  Outcome: Progressing  Goal: Maintains/Returns to pre admission functional level  Description: INTERVENTIONS:  - Perform BMAT or MOVE assessment daily.   - Set and communicate daily mobility goal to care team and patient/family/caregiver. - Collaborate with rehabilitation services on mobility goals if consulted  - Perform Range of Motion 3 times a day. - Reposition patient every 3 hours.   - Dangle patient 3 times a day  - Stand patient 3 times a day  - Ambulate patient 3 times a day  - Out of bed to chair 3 times a day   - Out of bed for meals 3 times a day  - Out of bed for toileting  - Record patient progress and toleration of activity level   Outcome: Progressing     Problem: PAIN - ADULT  Goal: Verbalizes/displays adequate comfort level or baseline comfort level  Description: Interventions:  - Encourage patient to monitor pain and request assistance  - Assess pain using appropriate pain scale  - Administer analgesics based on type and severity of pain and evaluate response  - Implement non-pharmacological measures as appropriate and evaluate response  - Consider cultural and social influences on pain and pain management  - Notify physician/advanced practitioner if interventions unsuccessful or patient reports new pain  Outcome: Progressing

## 2023-09-04 NOTE — ASSESSMENT & PLAN NOTE
Lab Results   Component Value Date    HGBA1C 6.6 (H) 07/07/2022       Recent Labs     09/03/23  1608 09/03/23  2042 09/04/23  0659 09/04/23  1140   POCGLU 96 143* 132 157*       Blood Sugar Average: Last 72 hrs:  (P) 699.8369188120794956     Podiatry consulted for [facility] grade 1 ulcer submetatarsal 1 left foot with cellulitis and concern for septic joint and osteomyelitis on MRI  · S/P Debridement 8/21 and 8/24  · Noted to have elevated CRP, procalcitonin  · Wound culture 8/21 growing 1 + pseudomonas aeruginosa, 2 + enterobacter cloacae  · Wound culture 8/24 growing proteus, enterobacter, enterococcus  · MRI R foot: First submetatarsal head plantar skin ulceration with suspected draining sinus tract extending to the first metatarsal phalangeal joint suspicious for septic arthropathy  · Vascular consulted as imaging showing lower limb diffuse atherosclerotic disease noted throughout with elevated PSV of proximal popliteal artery and tibial peroneal trunk suggesting less than 50% stenosis, metatarsal pressure greater than 200, great toe pressure 95 mmHg within the healing range on right side. Left lower limb showed diffuse atherosclerotic disease noted throughout with a 50 to 75% stenosis of the proximal popliteal artery, metatarsal pressure 170, great toe pressure 98 mmHg within healing range.   · angiogram by IR  8/29/23  · s/p right first ray partial resection and application of wound VAC on 8/31  · S/p wound vac change 9/2  · Plan for wound vac change 9/5 and possible delayed primary closure by podiatry; follow-up on recommendations

## 2023-09-04 NOTE — PHYSICAL THERAPY NOTE
PT TREATMENT     Time In: 8754  Time Out: 1147       09/04/23 1137   PT Last Visit   PT Visit Date 09/04/23   Note Type   Note Type Treatment   Pain Assessment   Pain Assessment Tool 0-10   Pain Score No Pain   Restrictions/Precautions   Weight Bearing Precautions Per Order Yes   RLE Weight Bearing Per Order NWB   Other Precautions Contact/isolation; Chair Alarm; Bed Alarm;WBS;Multiple lines; Fall Risk   General   Chart Reviewed Yes   Cognition   Overall Cognitive Status WFL   Arousal/Participation Alert; Cooperative   Attention Attends with cues to redirect   Orientation Level Oriented X4   Following Commands Follows multistep commands without difficulty   Subjective   Subjective "I'm doing okay, the doctor said I can put my heel down on the ground now."   Bed Mobility   Additional Comments Pt seated at EOB upon PT arrival   Transfers   Sit to Stand 4  Minimal assistance   Additional items Increased time required  (elevated bed height)   Stand to Sit 4  Minimal assistance   Additional items Assist x 1; Increased time required;Bed elevated   Ambulation/Elevation   Ambulation/Elevation Additional Comments ambulation not performed today, pt reporting "want to be close to the commode in case I got to go, I haven't gone in awhile, I know I will soon though"   Balance   Static Sitting Normal   Dynamic Sitting Good   Static Standing Poor   Dynamic Standing Poor   Activity Tolerance   Activity Tolerance Patient tolerated treatment well;Patient limited by fatigue   Nurse Made Aware RN Farhana   Exercises   Hip Flexion Sitting;20 reps;AROM; Bilateral   Hip Adduction Sitting;20 reps;AROM; Bilateral   Knee AROM Long Arc Quad Sitting;20 reps;AROM; Bilateral   Neuro re-ed sit <> stand 10 repetitions with R heel down on ground to aid with balance   Assessment   Problem List Decreased strength;Decreased range of motion;Decreased endurance; Impaired balance;Decreased mobility;Orthopedic restrictions   Assessment Pt seated at EOB upon PT arrival. Pt agreeable to PT session today. Pt demonstrating good tolerance to all seated exercises. Pt able to perform sit <> stand for 10 repetitions min A x 1 with R heel on ground. Pt deferring walking today secondary to "I think I need to stay close to the commode, I will have to go soon." Call bell and phone within reach. All needs met and pt reports no further questions for PT at this time. The patient's AM-PAC Basic Mobility Inpatient Short Form Raw Score is 13. A Raw score of less than or equal to 16 suggests the patient may benefit from discharge to post-acute rehabilitation services. Please also refer to the recommendation of the Physical Therapist for safe discharge planning. Goals   STG Expiration Date 09/08/23   LTG Expiration Date 09/15/23   Plan   Treatment/Interventions ADL retraining;Functional transfer training;LE strengthening/ROM; Therapeutic exercise;Patient/family training;Bed mobility;Gait training; Compensatory technique education;Spoke to nursing   Progress Progressing toward goals   PT Frequency   (5x/wk)   Recommendation   PT Discharge Recommendation Post acute rehabilitation services   AM-PAC Basic Mobility Inpatient   Turning in Flat Bed Without Bedrails 4   Lying on Back to Sitting on Edge of Flat Bed Without Bedrails 3   Moving Bed to Chair 2   Standing Up From Chair Using Arms 2   Walk in Room 1   Climb 3-5 Stairs With Railing 1   Basic Mobility Inpatient Raw Score 13   Basic Mobility Standardized Score 33.99   Highest Level Of Mobility   JH-HLM Goal 4: Move to chair/commode   JH-HLM Achieved 4: Move to chair/commode   End of Consult   Patient Position at End of Consult Seated edge of bed; All needs within reach   [id number] Sumit Schmidt PT, DPT [id number]

## 2023-09-04 NOTE — PROGRESS NOTES
Podiatry - Progress Note  Patient: Lester Thomas 58 y.o. male   MRN: [id number]  PCP: Anushka Ansari MD  Unit/Bed#: 913 [facility] 421-01 Encounter: [id number]  Date Of Visit: 23    ASSESSMENT:    Lester Thomas is a 58 y.o. male with:    1. Right submet 1 ulceration probing to bone with underlying osteomyelitis with septic arthritis - [place] 3  1. S/p partial 1st ray resection with application of wound VAC  2. Left submet 1 callus  3. T2DM with peripheral neuropathy  4. ESRD    PLAN:    · Plan for continued VAC therapy to R plantar foot. · Next VAC change Tuesday, . Will consider possible delayed primary closure vs outpatient wound VAC therapy at this time  · Intra-op cultures with growth of E. Cloacae post-lavage. · Elevation and offloading on green foam wedges or pillows when non-ambulatory. · Rest of care per primary team.     Weightbearing status: Non-weightbearing right foot    SUBJECTIVE:     The patient was seen, evaluated, and assessed at bedside today. The patient was awake, alert, and in no acute distress. No acute events overnight. The patient reports no pain to his right foot. Patient denies N/V/F/chills/SOB/CP. OBJECTIVE:     Vitals:   /57   Pulse 64   Temp (!) 97.4 °F (36.3 °C)   Resp 18   Ht 6' 1" (1.854 m)   Wt 115 kg (253 lb)   SpO2 94%   BMI 33.38 kg/m²     Temp (24hrs), Av.6 °F (36.4 °C), Min:97.4 °F (36.3 °C), Max:97.9 °F (36.6 °C)      Physical Exam:     General: Alert, cooperative and no distress  Lungs: Non labored breathing  Abdomen: Soft, non-tender. Lower extremity exam:  Dressings to RLE clean, dry, intact with no strikethrough noted. Wound VAC functioning correctly at 125mmHg low continuous with no leaks or blockages noted.      Additional Data:     Labs:    Results from last 7 days   Lab Units 23  0558   WBC Thousand/uL 9.99   HEMOGLOBIN g/dL 9.1*   HEMATOCRIT % 28.7*   PLATELETS Thousands/uL 322     Results from last 7 days   Lab Units 09/04/23  0558   POTASSIUM mmol/L 5.2   CHLORIDE mmol/L 94*   CO2 mmol/L 30   BUN mg/dL 44*   CREATININE mg/dL 6.61*   CALCIUM mg/dL 8.5           * I Have Reviewed All Lab Data Listed Above. Recent Cultures (last 7 days):     Results from last 7 days   Lab Units 08/31/23  1739 08/31/23  1738   GRAM STAIN RESULT  No Polys or Bacteria seen  Rare Epithelial Cells No Polys or Bacteria seen   WOUND CULTURE  3 colonies Enterobacter cloacae* No growth     Results from last 7 days   Lab Units 08/31/23  1739 08/31/23  1738   ANAEROBIC CULTURE  No anaerobes isolated No growth       Imaging: I have personally reviewed pertinent films in PACS  EKG, Pathology, and Other Studies: I have personally reviewed pertinent reports. ** Please Note: Portions of the record may have been created with voice recognition software. Occasional wrong word or "sound a like" substitutions may have occurred due to the inherent limitations of voice recognition software. Read the chart carefully and recognize, using context, where substitutions have occurred.  **

## 2023-09-04 NOTE — ASSESSMENT & PLAN NOTE
Lab Results   Component Value Date    HGBA1C 6.6 (H) 07/07/2022       Recent Labs     09/03/23  1608 09/03/23  2042 09/04/23  0659 09/04/23  1140   POCGLU 96 143* 132 157*       Blood Sugar Average: Last 72 hrs:  (P) [id number]   · Patient is on linagliptin at home  · Continue Humalog sliding scale with Accu-Cheks before every meal and at bedtime  · diabetic diet  · lantus 15 hs and lispro 5 units tid with meals

## 2023-09-04 NOTE — PLAN OF CARE
Problem: Potential for Falls  Goal: Patient will remain free of falls  Description: INTERVENTIONS:  - Educate patient/family on patient safety including physical limitations  - Instruct patient to call for assistance with activity   - Consult OT/PT to assist with strengthening/mobility   - Keep Call bell within reach  - Keep bed low and locked with side rails adjusted as appropriate  - Keep care items and personal belongings within reach  - Initiate and maintain comfort rounds  - Make Fall Risk Sign visible to staff  - Apply yellow socks and bracelet for high fall risk patients  - Consider moving patient to room near nurses station  Outcome: Progressing     Problem: MOBILITY - ADULT  Goal: Maintain or return to baseline ADL function  Description: INTERVENTIONS:  - Educate patient/family on patient safety including physical limitations  - Instruct patient to call for assistance with activity   - Consult OT/PT to assist with strengthening/mobility   - Keep Call bell within reach  - Keep bed low and locked with side rails adjusted as appropriate  - Keep care items and personal belongings within reach  - Initiate and maintain comfort rounds  - Make Fall Risk Sign visible to staff  - Apply yellow socks and bracelet for high fall risk patients  - Consider moving patient to room near nurses station  Outcome: Progressing     Problem: PAIN - ADULT  Goal: Verbalizes/displays adequate comfort level or baseline comfort level  Description: Interventions:  - Encourage patient to monitor pain and request assistance  - Assess pain using appropriate pain scale  - Administer analgesics based on type and severity of pain and evaluate response  - Implement non-pharmacological measures as appropriate and evaluate response  - Consider cultural and social influences on pain and pain management  - Notify physician/advanced practitioner if interventions unsuccessful or patient reports new pain  Outcome: Progressing     Problem: INFECTION - ADULT  Goal: Absence or prevention of progression during hospitalization  Description: INTERVENTIONS:  - Assess and monitor for signs and symptoms of infection  - Monitor lab/diagnostic results  - Monitor all insertion sites, i.e. indwelling lines, tubes, and drains  - Monitor endotracheal if appropriate and nasal secretions for changes in amount and color  - [place] appropriate cooling/warming therapies per order  - Administer medications as ordered  - Instruct and encourage patient and family to use good hand hygiene technique  - Identify and instruct in appropriate isolation precautions for identified infection/condition  Outcome: Progressing

## 2023-09-04 NOTE — ASSESSMENT & PLAN NOTE
Lab Results   Component Value Date    EGFR 8 09/04/2023    EGFR 10 09/03/2023    EGFR 7 09/02/2023    CREATININE 6.61 (H) 09/04/2023    CREATININE 5.23 (H) 09/03/2023    CREATININE 7.03 (H) 09/02/2023   · Patient is on Tuesday Thursday Saturday schedule at [facility]  · IJ permcath removed, temporary  catheter placed 8/25  · Nephrology following  · Patient cleared by ID for permacath placement which he underwent on 8/30

## 2023-09-04 NOTE — PROGRESS NOTES
[address] [facility]  Progress Note  Name: Laura Mehta  MRN: [id number]  Unit/Bed#: [address] [facility] 421-01 I Date of Admission: 8/19/2023   Date of Service: 9/4/2023 I Hospital Day: 16    Assessment/Plan   * Polymicrobial bacterial infection  Assessment & Plan  Polymicrobial bacteremia.  Enterobacter/Proteus/Streptococcus/Granulicatella  · Suspect cause of patient's initial metabolic encephalopathy which has improved with abx treatment  · ID following  · Repeat blood cultures negative  · D/c cefepime and flagyl following surgical cure  · Started on vancomycin 8/29 - continue through 9/6  · Afebrile  · Monitor fever curve and cbc    Diabetic ulcer of left midfoot associated with type 2 diabetes mellitus, limited to breakdown of skin [facility])  Assessment & Plan  Lab Results   Component Value Date    HGBA1C 6.6 (H) 07/07/2022       Recent Labs     09/03/23  1608 09/03/23  2042 09/04/23  0659 09/04/23  1140   POCGLU 96 143* 132 157*       Blood Sugar Average: Last 72 hrs:  (P) [id number]     Podiatry consulted for Delgadillo grade 1 ulcer submetatarsal 1 left foot with cellulitis and concern for septic joint and osteomyelitis on MRI  · S/P Debridement 8/21 and 8/24  · Noted to have elevated CRP, procalcitonin  · Wound culture 8/21 growing 1 + pseudomonas aeruginosa, 2 + enterobacter cloacae  · Wound culture 8/24 growing proteus, enterobacter, enterococcus  · MRI R foot: First submetatarsal head plantar skin ulceration with suspected draining sinus tract extending to the first metatarsal phalangeal joint suspicious for septic arthropathy  · Vascular consulted as imaging showing lower limb diffuse atherosclerotic disease noted throughout with elevated PSV of proximal popliteal artery and tibial peroneal trunk suggesting less than 50% stenosis, metatarsal pressure greater than 200, great toe pressure 95 mmHg within the healing range on right side.   Left lower limb showed diffuse atherosclerotic disease noted throughout with a 50 to 75% stenosis of the proximal popliteal artery, metatarsal pressure 170, great toe pressure 98 mmHg within healing range. · angiogram by IR  8/29/23  · s/p right first ray partial resection and application of wound VAC on 8/31  · S/p wound vac change 9/2  · Plan for wound vac change 9/5 and possible delayed primary closure by podiatry; follow-up on recommendations    Status post fall  Assessment & Plan  Patient sustained a fall about 2 days prior to admission   · Etiology not clear-EKG was unremarkable. Blood sugar was 157. Questionable hypoglycemia versus accidental fall. Patient not exhibiting any signs of stroke. · Telemetry showed no evidence of arrhythmia  · Patient does not remember how he fell. Patient was found on the floor and could not get up. · PT/OT evaluation and treatment, recommending home with home health currently, however patient feels that he is deconditioned and is concerned about his status postoperatively.   Continue to follow  · CAT scan of the brain, CT chest abdomen pelvis, cervical spine and lumbar reconstructions study was ordered in the ED but patient refused the studies  · Patient got CT of the head and neck after receiving Ativan which was unremarkable  · Fall precautions    ESRD (end stage renal disease) [facility])  Assessment & Plan  Lab Results   Component Value Date    EGFR 8 09/04/2023    EGFR 10 09/03/2023    EGFR 7 09/02/2023    CREATININE 6.61 (H) 09/04/2023    CREATININE 5.23 (H) 09/03/2023    CREATININE 7.03 (H) 09/02/2023   · Patient is on Tuesday Thursday Saturday schedule at [address]  · IJ permcath removed, temporary  catheter placed 8/25  · Nephrology following  · Patient cleared by ID for permacath placement which he underwent on 8/30    Type 2 diabetes mellitus, without long-term current use of insulin [facility])  Assessment & Plan  Lab Results   Component Value Date    HGBA1C 6.6 (H) 07/07/2022       Recent Labs     09/03/23  1608 09/03/23 2042 09/04/23  0659 09/04/23  1140   POCGLU 96 143* 132 157*       Blood Sugar Average: Last 72 hrs:  (P) [id number]   · Patient is on linagliptin at home  · Continue Humalog sliding scale with Accu-Cheks before every meal and at bedtime  · diabetic diet  · lantus 15 hs and lispro 5 units tid with meals    Hypertension  Assessment & Plan  · Continue Procardia 30 mg p.o. twice daily, Coreg 12.5 mg p.o. twice daily  · Vital signs as per unit routine               VTE Pharmacologic Prophylaxis: VTE Score: 2 Moderate Risk (Score 3-4) - Pharmacological DVT Prophylaxis Ordered: heparin. Patient Centered Rounds: I performed bedside rounds with nursing staff today. Discussions with Specialists or Other Care Team Provider: Nursing    Education and Discussions with Family / Patient: Patient indicated would call daughter. Total Time Spent on Date of Encounter in care of patient: 45 minutes This time was spent on one or more of the following: performing physical exam; counseling and coordination of care; obtaining or reviewing history; documenting in the medical record; reviewing/ordering tests, medications or procedures; communicating with other healthcare professionals and discussing with patient's family/caregivers. Current Length of Stay: 16 day(s)  Current Patient Status: Inpatient   Certification Statement: The patient will continue to require additional inpatient hospital stay due to IV abx, wound vac change with podiatry, dialysis repeat labs   Discharge Plan: Anticipate discharge in 48 hrs to rehab facility. Code Status: Level 1 - Full Code    Subjective:   Patient seen sitting up in bed resting comfortably, good appetite for breakfast no nausea or vomiting. Is doing well with the wound VAC. Is concerned that he has not had a bowel movement for a few days. Feels like he is going to have bowel movement soon, concerned about getting onto bedside commode.   Instructed patient to put on call light so nursing staff can assist.    Objective:     Vitals:   Temp (24hrs), Av.6 °F (36.4 °C), Min:97.4 °F (36.3 °C), Max:97.9 °F (36.6 °C)    Temp:  [97.4 °F (36.3 °C)-97.9 °F (36.6 °C)] 97.4 °F (36.3 °C)  HR:  [63-72] 64  Resp:  [18] 18  BP: (116-143)/(57-65) 116/57  SpO2:  [90 %-96 %] 94 %  Body mass index is 33.38 kg/m². Input and Output Summary (last 24 hours):   No intake or output data in the 24 hours ending 23 1357    Physical Exam:   Physical Exam  Vitals and nursing note reviewed. Constitutional:       Appearance: Normal appearance. HENT:      Head: Normocephalic. Nose: Nose normal.      Mouth/Throat:      Mouth: Mucous membranes are moist.   Eyes:      Extraocular Movements: Extraocular movements intact. Conjunctiva/sclera: Conjunctivae normal.      Pupils: Pupils are equal, round, and reactive to light. Cardiovascular:      Rate and Rhythm: Normal rate and regular rhythm. Pulses: Normal pulses. Pulmonary:      Effort: Pulmonary effort is normal.      Breath sounds: Normal breath sounds. Abdominal:      General: Bowel sounds are normal. There is no distension. Palpations: Abdomen is soft. Tenderness: There is no abdominal tenderness. Genitourinary:     Comments: Voiding spontaneously   Musculoskeletal:      Cervical back: Normal range of motion. Comments: Right foot surgical dressing with wound vac. Skin:     Capillary Refill: Capillary refill takes less than 2 seconds. Comments: Surgical dressing right foot; scabbed abrasions bilateral knees    Neurological:      General: No focal deficit present. Mental Status: He is alert and oriented to person, place, and time. Psychiatric:         Mood and Affect: Mood normal.         Behavior: Behavior normal.         Thought Content:  Thought content normal.         Judgment: Judgment normal.          Additional Data:     Labs:  Results from last 7 days   Lab Units 23  0558   WBC Thousand/uL 9.99 HEMOGLOBIN g/dL 9.1*   HEMATOCRIT % 28.7*   PLATELETS Thousands/uL 322     Results from last 7 days   Lab Units 09/04/23  0558   SODIUM mmol/L 133*   POTASSIUM mmol/L 5.2   CHLORIDE mmol/L 94*   CO2 mmol/L 30   BUN mg/dL 44*   CREATININE mg/dL 6.61*   ANION GAP mmol/L 9   CALCIUM mg/dL 8.5   GLUCOSE RANDOM mg/dL 121         Results from last 7 days   Lab Units 09/04/23  1140 09/04/23  0659 09/03/23  2042 09/03/23  1608 09/03/23  1110 09/03/23  0721 09/02/23  2151 09/02/23  1652 09/02/23  1115 09/02/23  0758 09/01/23  2234 09/01/23  1632   POC GLUCOSE mg/dl 157* 132 143* 96 167* 126 149* 132 99 115 169* 138               Lines/Drains:  Invasive Devices     Peripheral Intravenous Line  Duration           Peripheral IV 09/02/23 Distal;Left;Upper;Ventral (anterior) Arm 1 day          Hemodialysis Catheter  Duration           HD Permanent Double Catheter 5 days                      Imaging: No pertinent imaging reviewed.     Recent Cultures (last 7 days):   Results from last 7 days   Lab Units 08/31/23  1739 08/31/23  1738   GRAM STAIN RESULT  No Polys or Bacteria seen  Rare Epithelial Cells No Polys or Bacteria seen   WOUND CULTURE  3 colonies Enterobacter cloacae* No growth       Last 24 Hours Medication List:   Current Facility-Administered Medications   Medication Dose Route Frequency Provider Last Rate   • acetaminophen  650 mg Oral Q6H PRN Justine Botello DPM     • acetaminophen  975 mg Oral Q8H Won Parmar DPM     • atorvastatin  80 mg Oral Daily Justine Botello DPM     • calcium carbonate  1,000 mg Oral BID PRN GRANT Rich     • carvedilol  12.5 mg Oral BID Justine Botello DPM     • famotidine  10 mg Oral Daily Justine Botello DPM     • gabapentin  200 mg Oral BID Justine Botello DPM     • haloperidol lactate  2 mg Intramuscular Q6H PRN Justine Botello DPM     • heparin (porcine)  5,000 Units Subcutaneous [facility] Won Parmar DPM     • HYDROmorphone  1 mg Intravenous Q4H PRN Justine Botello DPM     • insulin glargine  15 Units Subcutaneous HS Mark Anthony Hides, DPM     • insulin lispro  1-5 Units Subcutaneous HS Mark Anthony Hides, DPM     • insulin lispro  2-12 Units Subcutaneous TID EMMA Parmar, DPM     • insulin lispro  5 Units Subcutaneous TID With Meals Mark Anthony Hides, DPM     • lidocaine  1 patch Topical Daily Mark Anthony Hides, DPM     • LORazepam  0.5 mg Intravenous Q6H PRN Mark Anthony Hides, DPM     • methocarbamol  500 mg Oral Q12H PRN Mark Anthony Hides, DPM     • NIFEdipine  30 mg Oral BID Mark Anthony Hides, DPM     • ondansetron  4 mg Intravenous Q6H PRN Mark Anthony Hides, DPM     • oxyCODONE  5 mg Oral Q8H PRN Mark Anthony Hides, DPM     • polyethylene glycol  17 g Oral Daily Naren Jain PA-C     • senna-docusate sodium  2 tablet Oral BID Mark Anthony Hides, DPM     • sevelamer  800 mg Oral TID With Meals Mark Anthony Hides, DPM     • [START ON 9/5/2023] vancomycin  750 mg Intravenous Once per day on Tue Thu Hilario Ludwig PA-C          Today, Patient Was Seen By: GRANT Burton    **Please Note: This note may have been constructed using a voice recognition system. **

## 2023-09-05 ENCOUNTER — APPOINTMENT (INPATIENT)
Dept: DIALYSIS | Facility: HOSPITAL | Age: 63
DRG: 474 | End: 2023-09-05
Payer: MEDICARE

## 2023-09-05 PROBLEM — R00.1 JUNCTIONAL BRADYCARDIA: Status: ACTIVE | Noted: 2023-09-05

## 2023-09-05 LAB
ANION GAP SERPL CALCULATED.3IONS-SCNC: 9 MMOL/L
ATRIAL RATE: 37 BPM
ATRIAL RATE: 38 BPM
ATRIAL RATE: 40 BPM
BUN SERPL-MCNC: 61 MG/DL (ref 5–25)
CALCIUM SERPL-MCNC: 8.5 MG/DL (ref 8.4–10.2)
CARDIAC TROPONIN I PNL SERPL HS: 9 NG/L (ref 8–18)
CHLORIDE SERPL-SCNC: 93 MMOL/L (ref 96–108)
CO2 SERPL-SCNC: 31 MMOL/L (ref 21–32)
CREAT SERPL-MCNC: 7.93 MG/DL (ref 0.6–1.3)
ERYTHROCYTE [DISTWIDTH] IN BLOOD BY AUTOMATED COUNT: 17.8 % (ref 11.6–15.1)
GFR SERPL CREATININE-BSD FRML MDRD: 6 ML/MIN/1.73SQ M
GLUCOSE SERPL-MCNC: 113 MG/DL (ref 65–140)
GLUCOSE SERPL-MCNC: 113 MG/DL (ref 65–140)
GLUCOSE SERPL-MCNC: 122 MG/DL (ref 65–140)
GLUCOSE SERPL-MCNC: 169 MG/DL (ref 65–140)
GLUCOSE SERPL-MCNC: 92 MG/DL (ref 65–140)
HCT VFR BLD AUTO: 28.4 % (ref 36.5–49.3)
HGB BLD-MCNC: 8.9 G/DL (ref 12–17)
MCH RBC QN AUTO: 29.5 PG (ref 26.8–34.3)
MCHC RBC AUTO-ENTMCNC: 31.3 G/DL (ref 31.4–37.4)
MCV RBC AUTO: 94 FL (ref 82–98)
PLATELET # BLD AUTO: 324 THOUSANDS/UL (ref 149–390)
PMV BLD AUTO: 10.2 FL (ref 8.9–12.7)
POTASSIUM SERPL-SCNC: 5.7 MMOL/L (ref 3.5–5.3)
QRS AXIS: 12 DEGREES
QRS AXIS: 14 DEGREES
QRS AXIS: 6 DEGREES
QRSD INTERVAL: 70 MS
QRSD INTERVAL: 80 MS
QRSD INTERVAL: 84 MS
QT INTERVAL: 474 MS
QT INTERVAL: 490 MS
QT INTERVAL: 498 MS
QTC INTERVAL: 386 MS
QTC INTERVAL: 389 MS
QTC INTERVAL: 395 MS
RBC # BLD AUTO: 3.02 MILLION/UL (ref 3.88–5.62)
SODIUM SERPL-SCNC: 133 MMOL/L (ref 135–147)
T WAVE AXIS: 40 DEGREES
T WAVE AXIS: 42 DEGREES
T WAVE AXIS: 44 DEGREES
VANCOMYCIN SERPL-MCNC: 20.5 UG/ML (ref 10–20)
VENTRICULAR RATE: 38 BPM
VENTRICULAR RATE: 38 BPM
VENTRICULAR RATE: 40 BPM
WBC # BLD AUTO: 12.43 THOUSAND/UL (ref 4.31–10.16)

## 2023-09-05 PROCEDURE — 93005 ELECTROCARDIOGRAM TRACING: CPT

## 2023-09-05 PROCEDURE — 80048 BASIC METABOLIC PNL TOTAL CA: CPT | Performed by: NURSE PRACTITIONER

## 2023-09-05 PROCEDURE — 84484 ASSAY OF TROPONIN QUANT: CPT | Performed by: PHYSICIAN ASSISTANT

## 2023-09-05 PROCEDURE — 93010 ELECTROCARDIOGRAM REPORT: CPT | Performed by: INTERNAL MEDICINE

## 2023-09-05 PROCEDURE — 99232 SBSQ HOSP IP/OBS MODERATE 35: CPT | Performed by: NURSE PRACTITIONER

## 2023-09-05 PROCEDURE — 99233 SBSQ HOSP IP/OBS HIGH 50: CPT | Performed by: INTERNAL MEDICINE

## 2023-09-05 PROCEDURE — 80202 ASSAY OF VANCOMYCIN: CPT | Performed by: PHYSICIAN ASSISTANT

## 2023-09-05 PROCEDURE — 82948 REAGENT STRIP/BLOOD GLUCOSE: CPT

## 2023-09-05 PROCEDURE — 99232 SBSQ HOSP IP/OBS MODERATE 35: CPT | Performed by: INTERNAL MEDICINE

## 2023-09-05 PROCEDURE — 85027 COMPLETE CBC AUTOMATED: CPT | Performed by: NURSE PRACTITIONER

## 2023-09-05 RX ORDER — CALCIUM GLUCONATE 20 MG/ML
1 INJECTION, SOLUTION INTRAVENOUS ONCE
Status: COMPLETED | OUTPATIENT
Start: 2023-09-05 | End: 2023-09-05

## 2023-09-05 RX ADMIN — GABAPENTIN 200 MG: 250 SUSPENSION ORAL at 13:27

## 2023-09-05 RX ADMIN — HEPARIN SODIUM 5000 UNITS: 5000 INJECTION INTRAVENOUS; SUBCUTANEOUS at 05:02

## 2023-09-05 RX ADMIN — INSULIN LISPRO 5 UNITS: 100 INJECTION, SOLUTION INTRAVENOUS; SUBCUTANEOUS at 13:22

## 2023-09-05 RX ADMIN — SEVELAMER HYDROCHLORIDE 800 MG: 800 TABLET ORAL at 13:19

## 2023-09-05 RX ADMIN — POLYETHYLENE GLYCOL 3350 17 G: 17 POWDER, FOR SOLUTION ORAL at 10:45

## 2023-09-05 RX ADMIN — INSULIN LISPRO 5 UNITS: 100 INJECTION, SOLUTION INTRAVENOUS; SUBCUTANEOUS at 18:32

## 2023-09-05 RX ADMIN — SENNOSIDES AND DOCUSATE SODIUM 2 TABLET: 50; 8.6 TABLET ORAL at 18:20

## 2023-09-05 RX ADMIN — INSULIN LISPRO 2 UNITS: 100 INJECTION, SOLUTION INTRAVENOUS; SUBCUTANEOUS at 18:31

## 2023-09-05 RX ADMIN — SENNOSIDES AND DOCUSATE SODIUM 2 TABLET: 50; 8.6 TABLET ORAL at 10:51

## 2023-09-05 RX ADMIN — ACETAMINOPHEN 975 MG: 325 TABLET ORAL at 13:26

## 2023-09-05 RX ADMIN — HEPARIN SODIUM 5000 UNITS: 5000 INJECTION INTRAVENOUS; SUBCUTANEOUS at 21:54

## 2023-09-05 RX ADMIN — FAMOTIDINE 10 MG: 20 TABLET, FILM COATED ORAL at 10:43

## 2023-09-05 RX ADMIN — ATORVASTATIN CALCIUM 80 MG: 80 TABLET, FILM COATED ORAL at 18:20

## 2023-09-05 RX ADMIN — CALCIUM GLUCONATE 1 G: 20 INJECTION, SOLUTION INTRAVENOUS at 08:29

## 2023-09-05 RX ADMIN — NIFEDIPINE 30 MG: 30 TABLET, EXTENDED RELEASE ORAL at 13:19

## 2023-09-05 RX ADMIN — NIFEDIPINE 30 MG: 30 TABLET, EXTENDED RELEASE ORAL at 18:20

## 2023-09-05 RX ADMIN — VANCOMYCIN HYDROCHLORIDE 750 MG: 750 INJECTION, SOLUTION INTRAVENOUS at 18:30

## 2023-09-05 RX ADMIN — GABAPENTIN 200 MG: 250 SUSPENSION ORAL at 18:30

## 2023-09-05 RX ADMIN — HEPARIN SODIUM 5000 UNITS: 5000 INJECTION INTRAVENOUS; SUBCUTANEOUS at 13:27

## 2023-09-05 RX ADMIN — SEVELAMER HYDROCHLORIDE 800 MG: 800 TABLET ORAL at 18:22

## 2023-09-05 NOTE — ASSESSMENT & PLAN NOTE
Lab Results   Component Value Date    HGBA1C 6.6 (H) 07/07/2022       Recent Labs     09/04/23  2056 09/05/23  0713 09/05/23  1131 09/05/23  1616   POCGLU 125 113 122 169*       Blood Sugar Average: Last 72 hrs:  (P) 131   · Patient is on linagliptin at home  · Continue Humalog sliding scale with Accu-Cheks before every meal and at bedtime  · diabetic diet  · lantus 15 hs and lispro 5 units tid with meals

## 2023-09-05 NOTE — PROGRESS NOTES
Podiatry - Progress Note  Patient: Davis Grant 58 y.o. male   MRN: [id number]  PCP: Marisol Al MD  Unit/Bed#: 913 [facility] 421-01 Encounter: [id number]  Date Of Visit: 23    ASSESSMENT:    Davis Grant is a 58 y.o. male with:    1. Right submet 1 ulceration probing to bone with underlying osteomyelitis with septic arthritis - Delgadillo 3  1. S/p partial 1st ray resection with application of wound VAC  2. Left submet 1 callus  3. T2DM with peripheral neuropathy  4. ESRD    PLAN:    · Plan for continued wound VAC therapy to right foot. Patient will require continued wound VAC therapy on discharge. · Patient is stable for discharge from podiatry standpoint, recommend close outpatient follow-up with myself in wound care. · Elevation and offloading on green foam wedges or pillows when non-ambulatory. · Rest of care per primary team.     Weightbearing status: Non-weightbearing right foot    SUBJECTIVE:     The patient was seen, evaluated, and assessed at bedside today. The patient was awake, alert, and in no acute distress. No acute events overnight. The patient reports no pain to his right foot. Patient denies N/V/F/chills/SOB/CP. OBJECTIVE:     Vitals:   /58 (BP Location: Left arm)   Pulse 67   Temp (!) 97.1 °F (36.2 °C) (Oral)   Resp 18   Ht 6' 1" (1.854 m)   Wt 115 kg (253 lb)   SpO2 94%   BMI 33.38 kg/m²     Temp (24hrs), Av.5 °F (36.4 °C), Min:97.1 °F (36.2 °C), Max:97.9 °F (36.6 °C)      Physical Exam:     General: Alert, cooperative and no distress  Lungs: Non labored breathing  Abdomen: Soft, non-tender. Lower extremity exam:  Pedal pulses intact. Light touch is absent. Pain on palpation negative. No calf tenderness noted.      Lower extremity wound(s) as noted below:  Wound #: 1  Location: Right plantar foot  Length 3.0 cm: Width 2.7 cm: Depth 1.5 cm:   Deepest Tissue Noted in Base: Muscle  Probe to Bone: No  Peripheral Skin Description: Attached, surrounding skin incision is stable skin edges well approximated and well coapted. No dehiscence noted. Slight maceration noted surrounding the incision secondary to wound VAC application. No local signs of infection. Granulation: 100% Fibrotic Tissue: 0% Necrotic Tissue: 0%   Drainage Amount: minimal, bloody  Signs of Infection: No    Clinical Images 09/05/23:      Additional Data:     Labs:    Results from last 7 days   Lab Units 09/05/23  0458   WBC Thousand/uL 12.43*   HEMOGLOBIN g/dL 8.9*   HEMATOCRIT % 28.4*   PLATELETS Thousands/uL 324     Results from last 7 days   Lab Units 09/05/23  0458   POTASSIUM mmol/L 5.7*   CHLORIDE mmol/L 93*   CO2 mmol/L 31   BUN mg/dL 61*   CREATININE mg/dL 7.93*   CALCIUM mg/dL 8.5           * I Have Reviewed All Lab Data Listed Above. Recent Cultures (last 7 days):     Results from last 7 days   Lab Units 08/31/23  1739 08/31/23  1738   GRAM STAIN RESULT  No Polys or Bacteria seen  Rare Epithelial Cells No Polys or Bacteria seen   WOUND CULTURE  3 colonies Enterobacter cloacae* No growth     Results from last 7 days   Lab Units 08/31/23  1739 08/31/23  1738   ANAEROBIC CULTURE  No anaerobes isolated No growth       Imaging: I have personally reviewed pertinent films in PACS  EKG, Pathology, and Other Studies: I have personally reviewed pertinent reports. ** Please Note: Portions of the record may have been created with voice recognition software. Occasional wrong word or "sound a like" substitutions may have occurred due to the inherent limitations of voice recognition software. Read the chart carefully and recognize, using context, where substitutions have occurred.  **

## 2023-09-05 NOTE — PROGRESS NOTES
[address] NOTE   Monica Tavarez 58 y.o. male MRN: [id number]  Unit/Bed#: [address] [place] [address] Encounter: [id number]  Reason for Consult: ESRD    ASSESSMENT and PLAN:    70-year-old male with a past medical history of ESRD, hypertension, CAD, TIA, diabetes, who initially presents with change in mental state. Nephrology on board for ESRD. 1-ESRD-    - Outpatient schedule TTS  - [facility]  - Last dialysis Saturday, September 2  -Access-new PermCath placed on August 30  - September 5-we will plan for dialysis    Plan  - Dialysis with 1 to 2 L ultrafiltration  - Estimated dry weight may be 106 kg predialysis weight today was 108 kg-  - EKGs reviewed with junctional rhythm, reviewed directly with cardiology advanced practitioner who will evaluate patient. Potassium was only 5.7 but we did give calcium gluconate in an attempt to stabilize myocardium predialysis  - Hold carvedilol until cardiology team can evaluate patient  - Vancomycin dosing per primary team  - update - pt seen on dialysis 955 am. Tolerating. HR has improved also to 65 syst. UF 2 L.     2-polymicrobial bacteremia-    - enterobacter/proteus/streptococcus/graulicatella  - Permacath removed due to bacteremia but possible source was likely foot ulceration.  - New PermCath placed  - Status post first ray partial resection August 31 wound VAC  - Per primary team    3-electrolytes-    - Hyperkalemia-change diet to low potassium diet. Dialysis today. 4-bradycardia- patient appears to be in junctional rhythm. Potassium was only 5.7 and generally would not expect to see changes in EKG due to potassium at this level but we gave calcium gluconate in a.m. while awaiting dialysis to start. Reviewed case directly with cardiology team who will evaluate patient. - Hold carvedilol for now    5-acid/base-bicarbonate stable    Portions of the record may have been created with voice recognition software.  Occasional wrong word or "sound a like" substitutions may have occurred due to the inherent limitations of voice recognition software. Read the chart carefully and recognize, using context, where substitutions have occurred. If you have any questions, please contact the dictating provider. SUBJECTIVE / 24H INTERVAL HISTORY:    Heart rate 38-40. Patient asymptomati with regards to dizziness or lightheadedness. Main concern is neuropathy pain currently. .  Blood pressures 1 21-1 30 and appropriate. On room air. Denies shortness of breath.     OBJECTIVE:  Current Weight: Weight - Scale: 115 kg (253 lb)  Vitals:    09/05/23 0545 09/05/23 0804 09/05/23 0900 09/05/23 0930   BP: 126/56 (!) 121/47 122/50 123/50   BP Location:  Left arm     Pulse: (!) 38 (!) 42 (!) 40 (!) 40   Resp:  18 18 18   Temp:  97.9 °F (36.6 °C)     TempSrc:  Oral     SpO2: 93% 95% 93% 99%   Weight:       Height:           Intake/Output Summary (Last 24 hours) at 9/5/2023 0933  Last data filed at 9/5/2023 0900  Gross per 24 hour   Intake 440 ml   Output 600 ml   Net -160 ml     General: NAD  Skin: no rash  Eyes: anicteric sclera  ENT: moist mucous membrane  Neck: supple  Chest: CTA b/l, no ronchii, no wheeze, no rubs, no rales  CVS: s1s2, no murmur, no gallop, no rub  Abdomen: soft, nontender, nl sounds  Extremities: no edema LE b/l, right lower extremity wound VAC and wrapped  : no gaitan  Neuro: AAOX3  Psych: normal affect  -Tunneled catheter site in place    Medications:    Current Facility-Administered Medications:   •  acetaminophen (TYLENOL) tablet 650 mg, 650 mg, Oral, Q6H PRN, Ambriz Ramp, DPM, 650 mg at 08/20/23 1141  •  acetaminophen (TYLENOL) tablet 975 mg, 975 mg, Oral, Q8H, Won Mishko, DPM, 975 mg at 09/03/23 0500  •  atorvastatin (LIPITOR) tablet 80 mg, 80 mg, Oral, Daily, Won Glasgowhkarly, DPM, 80 mg at 09/04/23 1717  •  calcium carbonate (TUMS) chewable tablet 1,000 mg, 1,000 mg, Oral, BID PRN, GRANT Rich, 1,000 mg at 09/04/23 4135  • carvedilol (COREG) tablet 12.5 mg, 12.5 mg, Oral, BID, Won Parmar, DPM, 12.5 mg at 09/04/23 1717  •  famotidine (PEPCID) tablet 10 mg, 10 mg, Oral, Daily, Won Parmar DPM, 10 mg at 09/04/23 0847  •  gabapentin (NEURONTIN) oral solution 200 mg, 200 mg, Oral, BID, Won Parmar DPM, 200 mg at 09/04/23 1717  •  haloperidol lactate (HALDOL) injection 2 mg, 2 mg, Intramuscular, Q6H PRN, Ge Abernathy DPTRICE, 2 mg at 08/23/23 1441  •  heparin (porcine) subcutaneous injection 5,000 Units, 5,000 Units, Subcutaneous, Q8H [facility], Ge Abernathy DPM, 5,000 Units at 09/05/23 0502  •  HYDROmorphone (DILAUDID) injection 1 mg, 1 mg, Intravenous, Q4H PRN, Ge Abernathy DPM, 1 mg at 08/25/23 2337  •  insulin glargine (LANTUS) subcutaneous injection 15 Units 0.15 mL, 15 Units, Subcutaneous, HS, Won Parmar DPM, 15 Units at 09/04/23 2115  •  insulin lispro (HumaLOG) 100 units/mL subcutaneous injection 1-5 Units, 1-5 Units, Subcutaneous, HS, Ge Abernathy DPM, 1 Units at 09/01/23 2241  •  insulin lispro (HumaLOG) 100 units/mL subcutaneous injection 2-12 Units, 2-12 Units, Subcutaneous, TID AC, 2 Units at 09/04/23 1220 **AND** Fingerstick Glucose (POCT), , , TID AC, Won Parmar DPM  •  insulin lispro (HumaLOG) 100 units/mL subcutaneous injection 5 Units, 5 Units, Subcutaneous, TID With Meals, Ge Abernathy DPM, 5 Units at 09/04/23 1716  •  lidocaine (LIDODERM) 5 % patch 1 patch, 1 patch, Topical, Daily, Ge Abernathy DPTRICE, 1 patch at 08/25/23 1578  •  LORazepam (ATIVAN) injection 0.5 mg, 0.5 mg, Intravenous, Q6H PRN, Ge Abernathy DPM, 0.5 mg at 08/31/23 0208  •  methocarbamol (ROBAXIN) tablet 500 mg, 500 mg, Oral, Q12H PRN, Ge Abernathy DPM, 500 mg at 08/25/23 1659  •  NIFEdipine (PROCARDIA XL) 24 hr tablet 30 mg, 30 mg, Oral, BID, Won Parmar DPM, 30 mg at 09/04/23 1717  •  ondansetron (ZOFRAN) injection 4 mg, 4 mg, Intravenous, Q6H PRN, Ge Abernathy DPM, 4 mg at 09/03/23 0441  •  oxyCODONE (ROXICODONE) IR tablet 5 mg, 5 mg, Oral, Q8H PRN, Justine Botello, GANESHM, 5 mg at 08/25/23 2221  •  polyethylene glycol (MIRALAX) packet 17 g, 17 g, Oral, Daily, Marco A Aguayo PA-C, 17 g at 09/04/23 0848  •  senna-docusate sodium (SENOKOT S) 8.6-50 mg per tablet 2 tablet, 2 tablet, Oral, BID, Justine Botello DPM, 2 tablet at 09/04/23 1717  •  sevelamer (RENAGEL) tablet 800 mg, 800 mg, Oral, TID With Meals, Justine Botello DPM, 800 mg at 09/04/23 1717  •  vancomycin (VANCOCIN) IVPB (premix in dextrose) 750 mg 150 mL, 750 mg, Intravenous, Once per day on Tue Thu Sat, [place], [place]    Laboratory Results:  Results from last 7 days   Lab Units 09/05/23  0458 09/04/23  0558 09/03/23  0456 09/02/23  0555 09/01/23  0522 08/31/23  0450 08/30/23  0553   WBC Thousand/uL 12.43* 9.99 11.97* 11.48* 14.32* 13.20* 14.70*   HEMOGLOBIN g/dL 8.9* 9.1* 10.2* 9.3* 9.3* 9.7* 9.3*   HEMATOCRIT % 28.4* 28.7* 32.3* 29.7* 30.1* 31.1* 30.0*   PLATELETS Thousands/uL 324 322 368 313 297 302 302   POTASSIUM mmol/L 5.7* 5.2 4.5 4.3 5.0 4.5 4.2   CHLORIDE mmol/L 93* 94* 93* 97 98 101 100   CO2 mmol/L 31 30 32 28 25 25 25   BUN mg/dL 61* 44* 29* 41* 31* 47* 38*   CREATININE mg/dL 7.93* 6.61* 5.23* 7.03* 5.85* 7.76* 6.49*   CALCIUM mg/dL 8.5 8.5 8.7 8.0* 8.2* 8.3* 8.2*   MAGNESIUM mg/dL  --   --   --   --   --  2.2  --

## 2023-09-05 NOTE — ASSESSMENT & PLAN NOTE
Lab Results   Component Value Date    HGBA1C 6.6 (H) 07/07/2022       Recent Labs     09/04/23  2056 09/05/23  0713 09/05/23  1131 09/05/23  1616   POCGLU 125 113 122 169*       Blood Sugar Average: Last 72 hrs:  (P) 131     Podiatry consulted for Delgadillo grade 1 ulcer submetatarsal 1 left foot with cellulitis and concern for septic joint and osteomyelitis on MRI  · S/P Debridement 8/21 and 8/24  · Noted to have elevated CRP, procalcitonin  · Wound culture 8/21 growing 1 + pseudomonas aeruginosa, 2 + enterobacter cloacae  · Wound culture 8/24 growing proteus, enterobacter, enterococcus  · MRI R foot: First submetatarsal head plantar skin ulceration with suspected draining sinus tract extending to the first metatarsal phalangeal joint suspicious for septic arthropathy  · Vascular consulted as imaging showing lower limb diffuse atherosclerotic disease noted throughout with elevated PSV of proximal popliteal artery and tibial peroneal trunk suggesting less than 50% stenosis, metatarsal pressure greater than 200, great toe pressure 95 mmHg within the healing range on right side. Left lower limb showed diffuse atherosclerotic disease noted throughout with a 50 to 75% stenosis of the proximal popliteal artery, metatarsal pressure 170, great toe pressure 98 mmHg within healing range.   · angiogram by IR  8/29/23  · s/p right first ray partial resection and application of wound VAC on 8/31  · S/p wound vac change 9/2  · Plan for wound vac change 9/5 and possible delayed primary closure by podiatry  · Patient tolerated wound VAC change without any difficulties None

## 2023-09-05 NOTE — PROGRESS NOTES
Fabrice Pacheco is a 58 y.o. male who is currently ordered Vancomycin IV with management by the Pharmacy Consult service. Relevant clinical data and objective / subjective history reviewed. Vancomycin Assessment:  Indication and Goal AUC/Trough: Bone/joint infection (goal -600, trough >10), -600, trough >10  Clinical Status: stable  Micro:     Renal Function:  SCr: 7.93 mg/dL  CrCl: 12.5 mL/min  Days of Therapy: 8  Current Dose: 1000mg IV on T-Th-Sat trough 15-20  Vancomycin Plan:  New Dosin mg IV T-- trough 15-20  Next Level: 0600 on 23   Renal Function Monitoring: Daily BMP and [place] will continue to follow closely for s/sx of nephrotoxicity, infusion reactions and appropriateness of therapy. BMP and CBC will be ordered per protocol. We will continue to follow the patient’s culture results and clinical progress daily.     Mario Muñoz, Pharmacist

## 2023-09-05 NOTE — PLAN OF CARE
Problem: Potential for Falls  Goal: Patient will remain free of falls  Description: INTERVENTIONS:  - Educate patient/family on patient safety including physical limitations  - Instruct patient to call for assistance with activity   - Consult OT/PT to assist with strengthening/mobility   - Keep Call bell within reach  - Keep bed low and locked with side rails adjusted as appropriate  - Keep care items and personal belongings within reach  - Initiate and maintain comfort rounds  - Make Fall Risk Sign visible to staff  - Apply yellow socks and bracelet for high fall risk patients  - Consider moving patient to room near nurses station  9/4/2023 2151 by Etta Rueda RN  Outcome: Progressing  9/4/2023 2151 by Etta Rueda RN  Outcome: Progressing     Problem: MOBILITY - ADULT  Goal: Maintain or return to baseline ADL function  Description: INTERVENTIONS:  - Educate patient/family on patient safety including physical limitations  - Instruct patient to call for assistance with activity   - Consult OT/PT to assist with strengthening/mobility   - Keep Call bell within reach  - Keep bed low and locked with side rails adjusted as appropriate  - Keep care items and personal belongings within reach  - Initiate and maintain comfort rounds  - Make Fall Risk Sign visible to staff  - Apply yellow socks and bracelet for high fall risk patients  - Consider moving patient to room near nurses station  9/4/2023 2151 by Etta Rueda RN  Outcome: Progressing  9/4/2023 2151 by Etta Rueda RN  Outcome: Progressing     Problem: PAIN - ADULT  Goal: Verbalizes/displays adequate comfort level or baseline comfort level  Description: Interventions:  - Encourage patient to monitor pain and request assistance  - Assess pain using appropriate pain scale  - Administer analgesics based on type and severity of pain and evaluate response  - Implement non-pharmacological measures as appropriate and evaluate response  - Consider cultural and social influences on pain and pain management  - Notify physician/advanced practitioner if interventions unsuccessful or patient reports new pain  Outcome: Progressing     Problem: INFECTION - ADULT  Goal: Absence or prevention of progression during hospitalization  Description: INTERVENTIONS:  - Assess and monitor for signs and symptoms of infection  - Monitor lab/diagnostic results  - Monitor all insertion sites, i.e. indwelling lines, tubes, and drains  - Monitor endotracheal if appropriate and nasal secretions for changes in amount and color  - [place] appropriate cooling/warming therapies per order  - Administer medications as ordered  - Instruct and encourage patient and family to use good hand hygiene technique  - Identify and instruct in appropriate isolation precautions for identified infection/condition  Outcome: Progressing  Goal: Absence of fever/infection during neutropenic period  Description: INTERVENTIONS:  - Monitor WBC    Outcome: Progressing     Problem: INFECTION - ADULT  Goal: Absence of fever/infection during neutropenic period  Description: INTERVENTIONS:  - Monitor WBC    Outcome: Progressing     Problem: SAFETY ADULT  Goal: Patient will remain free of falls  Description: INTERVENTIONS:  - Educate patient/family on patient safety including physical limitations  - Instruct patient to call for assistance with activity   - Consult OT/PT to assist with strengthening/mobility   - Keep Call bell within reach  - Keep bed low and locked with side rails adjusted as appropriate  - Keep care items and personal belongings within reach  - Initiate and maintain comfort rounds  - Make Fall Risk Sign visible to staff  - Apply yellow socks and bracelet for high fall risk patients  - Consider moving patient to room near nurses station  9/4/2023 2151 by Jessy Chávez RN  Outcome: Progressing  9/4/2023 2151 by Jessy Chávez RN  Outcome: Progressing     Problem: DISCHARGE PLANNING  Goal: Discharge to home or other facility with appropriate resources  Description: INTERVENTIONS:  - Identify barriers to discharge w/patient and caregiver  - Arrange for needed discharge resources and transportation as appropriate  - Identify discharge learning needs (meds, wound care, etc.)  - Arrange for interpretive services to assist at discharge as needed  - Refer to Case Management Department for coordinating discharge planning if the patient needs post-hospital services based on physician/advanced practitioner order or complex needs related to functional status, cognitive ability, or social support system  Outcome: Progressing     Problem: Knowledge Deficit  Goal: Patient/family/caregiver demonstrates understanding of disease process, treatment plan, medications, and discharge instructions  Description: Complete learning assessment and assess knowledge base.   Interventions:  - Provide teaching at level of understanding  - Provide teaching via preferred learning methods  Outcome: Progressing

## 2023-09-05 NOTE — ASSESSMENT & PLAN NOTE
· Hold coreg and procardia for now  · Tele  · EKG shows junctional oneil HR 38  · Asxs  · BP stable  · Trop negative  · Potassium noted to be 5.7, did receive calcium and dialysis with improvement of rate  · Cardiology consulted  · Continue telemetry

## 2023-09-05 NOTE — PROGRESS NOTES
[address] [facility]  Progress Note  Name: Alyx France  MRN: [id number]  Unit/Bed#: 913 [facility] 421-01 I Date of Admission: 8/19/2023   Date of Service: 9/5/2023 I Hospital Day: 17    Assessment/Plan   * Polymicrobial bacterial infection  Assessment & Plan  Polymicrobial bacteremia.  Enterobacter/Proteus/Streptococcus/Granulicatella  · Suspect cause of patient's initial metabolic encephalopathy which has improved with abx treatment  · ID following  · Repeat blood cultures negative  · D/c cefepime and flagyl following surgical cure  · Started on vancomycin 8/29 - continue through 9/6  · Afebrile  · Monitor fever curve and cbc    Bradycardia  Assessment & Plan  · Hold coreg and procardia for now  · Tele  · EKG shows junctional oneil HR 38  · Asxs  · BP stable  · Trop negative  · Potassium noted to be 5.7, did receive calcium and dialysis with improvement of rate  · Cardiology consulted  · Continue telemetry    Diabetic ulcer of left midfoot associated with type 2 diabetes mellitus, limited to breakdown of skin [facility])  Assessment & Plan  Lab Results   Component Value Date    HGBA1C 6.6 (H) 07/07/2022       Recent Labs     09/04/23  2056 09/05/23  0713 09/05/23  1131 09/05/23  1616   POCGLU 125 113 122 169*       Blood Sugar Average: Last 72 hrs:  (P) 131     Podiatry consulted for [place] grade 1 ulcer submetatarsal 1 left foot with cellulitis and concern for septic joint and osteomyelitis on MRI  · S/P Debridement 8/21 and 8/24  · Noted to have elevated CRP, procalcitonin  · Wound culture 8/21 growing 1 + pseudomonas aeruginosa, 2 + enterobacter cloacae  · Wound culture 8/24 growing proteus, enterobacter, enterococcus  · MRI R foot: First submetatarsal head plantar skin ulceration with suspected draining sinus tract extending to the first metatarsal phalangeal joint suspicious for septic arthropathy  · Vascular consulted as imaging showing lower limb diffuse atherosclerotic disease noted throughout with elevated PSV of proximal popliteal artery and tibial peroneal trunk suggesting less than 50% stenosis, metatarsal pressure greater than 200, great toe pressure 95 mmHg within the healing range on right side. Left lower limb showed diffuse atherosclerotic disease noted throughout with a 50 to 75% stenosis of the proximal popliteal artery, metatarsal pressure 170, great toe pressure 98 mmHg within healing range. · angiogram by IR  8/29/23  · s/p right first ray partial resection and application of wound VAC on 8/31  · S/p wound vac change 9/2  · Plan for wound vac change 9/5 and possible delayed primary closure by podiatry  · Patient tolerated wound VAC change without any difficulties    Status post fall  Assessment & Plan  Patient sustained a fall about 2 days prior to admission   · Etiology not clear-EKG was unremarkable. Blood sugar was 157. Questionable hypoglycemia versus accidental fall. Patient not exhibiting any signs of stroke. · Telemetry showed no evidence of arrhythmia  · Patient does not remember how he fell. Patient was found on the floor and could not get up. · PT/OT evaluation and treatment, recommending home with home health currently, however patient feels that he is deconditioned and is concerned about his status postoperatively.   Continue to follow  · CAT scan of the brain, CT chest abdomen pelvis, cervical spine and lumbar reconstructions study was ordered in the ED but patient refused the studies  · Patient got CT of the head and neck after receiving Ativan which was unremarkable  · Fall precautions    ESRD (end stage renal disease) [facility])  Assessment & Plan  Lab Results   Component Value Date    EGFR 6 09/05/2023    EGFR 8 09/04/2023    EGFR 10 09/03/2023    CREATININE 7.93 (H) 09/05/2023    CREATININE 6.61 (H) 09/04/2023    CREATININE 5.23 (H) 09/03/2023   · Patient is on Tuesday Thursday Saturday schedule at [facility]  · IJ permcath removed, temporary  catheter placed 8/25  · Nephrology following  · Patient cleared by ID for permacath placement which he underwent on 8/30  · Received dialysis on 9/5    Type 2 diabetes mellitus, without long-term current use of insulin [facility])  Assessment & Plan  Lab Results   Component Value Date    HGBA1C 6.6 (H) 07/07/2022       Recent Labs     09/04/23  2056 09/05/23  0713 09/05/23  1131 09/05/23  1616   POCGLU 125 113 122 169*       Blood Sugar Average: Last 72 hrs:  (P) 131   · Patient is on linagliptin at home  · Continue Humalog sliding scale with Accu-Cheks before every meal and at bedtime  · diabetic diet  · lantus 15 hs and lispro 5 units tid with meals    Hypertension  Assessment & Plan  · Hold Procardia 30 mg p.o. twice daily, Coreg 12.5 mg p.o. twice daily given bradycardia 9/5 AM  · Vital signs as per unit routine               VTE Pharmacologic Prophylaxis: VTE Score: 2 Moderate Risk (Score 3-4) - Pharmacological DVT Prophylaxis Ordered: heparin. Patient Centered Rounds: I performed bedside rounds with nursing staff today. Discussions with Specialists or Other Care Team Provider: multidisciplinary team     Education and Discussions with Family / Patient: patient indicated would call his daughter . Total Time Spent on Date of Encounter in care of patient: 45 minutes This time was spent on one or more of the following: performing physical exam; counseling and coordination of care; obtaining or reviewing history; documenting in the medical record; reviewing/ordering tests, medications or procedures; communicating with other healthcare professionals and discussing with patient's family/caregivers. Current Length of Stay: 17 day(s)  Current Patient Status: Inpatient   Certification Statement: The patient will continue to require additional inpatient hospital stay due to bradycardia, telemetry, repeat labs   Discharge Plan: Anticipate discharge in 24-48 hrs to rehab facility.     Code Status: Level 1 - Full Code    Subjective:   Patient seen laying in bed, reports feeling washed out. He is noted to have bradycardia 30s. Blood pressure stable. Is preparing for dialysis, no nausea or vomiting. Denies any chest pain. Patient seen later in the day, heart rate improved to sinus rhythm, feeling better. Objective:     Vitals:   Temp (24hrs), Av.6 °F (36.4 °C), Min:97.1 °F (36.2 °C), Max:97.9 °F (36.6 °C)    Temp:  [97.1 °F (36.2 °C)-97.9 °F (36.6 °C)] 97.8 °F (36.6 °C)  HR:  [38-75] 66  Resp:  [16-20] 18  BP: (120-140)/(47-64) 140/63  SpO2:  [90 %-99 %] 99 %  Body mass index is 33.38 kg/m². Input and Output Summary (last 24 hours): Intake/Output Summary (Last 24 hours) at 2023 1855  Last data filed at 2023 1230  Gross per 24 hour   Intake 500 ml   Output 2700 ml   Net -2200 ml       Physical Exam:   Physical Exam  Vitals and nursing note reviewed. Constitutional:       Comments: Patient reports feeling "washed out" today. HENT:      Head: Normocephalic. Nose: Nose normal.      Mouth/Throat:      Mouth: Mucous membranes are moist.   Eyes:      Extraocular Movements: Extraocular movements intact. Conjunctiva/sclera: Conjunctivae normal.      Pupils: Pupils are equal, round, and reactive to light. Cardiovascular:      Rate and Rhythm: Regular rhythm. Bradycardia present. Pulses: Normal pulses. Pulmonary:      Effort: Pulmonary effort is normal.      Breath sounds: Normal breath sounds. Abdominal:      General: Bowel sounds are normal. There is no distension. Palpations: Abdomen is soft. Tenderness: There is no abdominal tenderness. Genitourinary:     Comments: Voiding spontaneously   Musculoskeletal:      Cervical back: Normal range of motion. Comments: Right foot dressing with wound vac CDI   Skin:     General: Skin is warm and dry. Capillary Refill: Capillary refill takes less than 2 seconds. Comments: Right foot dressing; right anterior chest wall permacath present, dsg CDI.  No redness noted, no drainage   Neurological:      General: No focal deficit present. Mental Status: He is oriented to person, place, and time. Psychiatric:         Mood and Affect: Mood normal.         Behavior: Behavior normal.         Thought Content: Thought content normal.         Judgment: Judgment normal.          Additional Data:     Labs:  Results from last 7 days   Lab Units 09/05/23  0458   WBC Thousand/uL 12.43*   HEMOGLOBIN g/dL 8.9*   HEMATOCRIT % 28.4*   PLATELETS Thousands/uL 324     Results from last 7 days   Lab Units 09/05/23  0458   SODIUM mmol/L 133*   POTASSIUM mmol/L 5.7*   CHLORIDE mmol/L 93*   CO2 mmol/L 31   BUN mg/dL 61*   CREATININE mg/dL 7.93*   ANION GAP mmol/L 9   CALCIUM mg/dL 8.5   GLUCOSE RANDOM mg/dL 113         Results from last 7 days   Lab Units 09/05/23  1616 09/05/23  1131 09/05/23  0713 09/04/23  2056 09/04/23  1551 09/04/23  1140 09/04/23  0659 09/03/23  2042 09/03/23  1608 09/03/23  1110 09/03/23  0721 09/02/23  2151   POC GLUCOSE mg/dl 169* 122 113 125 120 157* 132 143* 96 167* 126 149*               Lines/Drains:  Invasive Devices     Peripheral Intravenous Line  Duration           Peripheral IV 09/02/23 Distal;Left;Upper;Ventral (anterior) Arm 2 days          Hemodialysis Catheter  Duration           HD Permanent Double Catheter 6 days                  Telemetry:  Telemetry Orders (From admission, onward)             24 Hour Telemetry Monitoring  Continuous x 24 Hours (Telem)        Comments: oneil   Question:  Reason for 24 Hour Telemetry  Answer:  Arrhythmias requiring acute medical intervention / PPM or ICD malfunction                 Telemetry Reviewed: Early in days patient with sinus bradycardia, heart rate improved to 60s, sinus rhythm postdialysis  Indication for Continued Telemetry Use: Arrthymias requiring medical therapy             Imaging: No pertinent imaging reviewed.     Recent Cultures (last 7 days):   Results from last 7 days   Lab Units 08/31/23  6337 08/31/23  1738   GRAM STAIN RESULT  No Polys or Bacteria seen  Rare Epithelial Cells No Polys or Bacteria seen   WOUND CULTURE  3 colonies Enterobacter cloacae* No growth       Last 24 Hours Medication List:   Current Facility-Administered Medications   Medication Dose Route Frequency Provider Last Rate   • acetaminophen  650 mg Oral Q6H PRN Claude Ramal, DPM     • acetaminophen  975 mg Oral Q8H Won Parmar, DPM     • atorvastatin  80 mg Oral Daily Claude Ramal, DPM     • calcium carbonate  1,000 mg Oral BID PRN GRANT Rich     • famotidine  10 mg Oral Daily Claude Ramal, DPM     • gabapentin  200 mg Oral BID Claude Ramal, DPM     • haloperidol lactate  2 mg Intramuscular Q6H PRN Claude Ramal, DPM     • heparin (porcine)  5,000 Units Subcutaneous [facility] Claude Ramal, DPM     • HYDROmorphone  1 mg Intravenous Q4H PRN Claude Ramal, DPM     • insulin glargine  15 Units Subcutaneous HS Claude Ramal, DPM     • insulin lispro  1-5 Units Subcutaneous HS Won Parmar DPM     • insulin lispro  2-12 Units Subcutaneous TID AC Won Parmar DPM     • insulin lispro  5 Units Subcutaneous TID With Meals Claude Ramal, DPM     • lidocaine  1 patch Topical Daily Claude Ramal, DPM     • LORazepam  0.5 mg Intravenous Q6H PRN Claude Ramal, DPM     • methocarbamol  500 mg Oral Q12H PRN Claude Ramal, DPM     • NIFEdipine  30 mg Oral BID Claude Ramal, DPM     • ondansetron  4 mg Intravenous Q6H PRN Claude Ramal, DPM     • oxyCODONE  5 mg Oral Q8H PRN Claude Ramal, DPM     • polyethylene glycol  17 g Oral Daily Fei Ibarra PA-C     • senna-docusate sodium  2 tablet Oral BID Claude Ramal, DPM     • sevelamer  800 mg Oral TID With Meals Claude Ramal, DPM     • vancomycin  750 mg Intravenous Once per day on Tue Thu Sat Homero Vargas PA-C 750 mg (09/05/23 1830)        Today, Patient Was Seen By: GRANT Faria    **Please Note: This note may have been constructed using a voice recognition system. **

## 2023-09-05 NOTE — PLAN OF CARE
HR-30-40's with no complaints. 12 lead EKG was done and it was junctional. Dr. Zaida Mckeon was aware and seen patient. [facility] and cardiologist aware. Calcium gluconate was given. Serum potassium is 5.7 on 2K 2.5Ca bath. Will attempt for UF-1.5-2 kg after an hour of treatment if stable and keep SBP>100 as per Dr. Zaida Mckeon  Problem: METABOLIC, FLUID AND ELECTROLYTES - ADULT  Goal: Electrolytes maintained within normal limits  Description: INTERVENTIONS:  - Monitor labs and assess patient for signs and symptoms of electrolyte imbalances  - Administer electrolyte replacement as ordered  - Monitor response to electrolyte replacements, including repeat lab results as appropriate  - Instruct patient on fluid and nutrition as appropriate  Outcome: Progressing  Goal: Fluid balance maintained  Description: INTERVENTIONS:  - Monitor labs   - Monitor I/O and WT  - Instruct patient on fluid and nutrition as appropriate  - Assess for signs & symptoms of volume excess or deficit  Outcome: Progressing   Post-Dialysis RN Treatment Note    Blood Pressure:  Pre 122/50 mm/Hg  Post 120/58 mmHg   EDW  tbd kg    Weight:  Pre 108.7 kg   Post 106.8 kg   Mode of weight measurement: Bed Scale   Volume Removed  2000 ml    Treatment duration 210 minutes    NS given  No    Treatment shortened?  No   Medications given during Rx None Reported   Estimated Kt/V  None Reported   Access type: Permacath/TDC   Access Issues: No    Report called to primary nurse   Yes      Demond Beltran RN

## 2023-09-05 NOTE — ASSESSMENT & PLAN NOTE
Lab Results   Component Value Date    EGFR 6 09/05/2023    EGFR 8 09/04/2023    EGFR 10 09/03/2023    CREATININE 7.93 (H) 09/05/2023    CREATININE 6.61 (H) 09/04/2023    CREATININE 5.23 (H) 09/03/2023   · Patient is on Tuesday Thursday Saturday schedule at [address]  · IJ permcath removed, temporary  catheter placed 8/25  · Nephrology following  · Patient cleared by ID for permacath placement which he underwent on 8/30  · Received dialysis on 9/5

## 2023-09-05 NOTE — PROGRESS NOTES
Consultation - Cardiology   [facility]     Andres Adams 58 y.o. male MRN: [id number]  : 1960  Unit/Bed#: [address] [place] [address] Encounter: [id number]      Assessment & Plan   1. Junctional/sinus bradycardia: Mildly symptomatic with complaints of generalized weakness    -   Hemodynamically stable    -   Heart rate improving after receiving calcium gluconate and starting hemodialysis    -   We will continue to monitor telemetry at this time    -   Not on AV marek blocking medication    2. Diabetes: Hemoglobin A1c 6.6    -   Managed per primary team    3. End-stage renal disease on hemodialysis: Managed per nephrology    -   Receiving hemodialysis treatment today    4. Polymicrobial bacteremia: On IV vancomycin    -   Infectious disease following    5. Diabetic foot ulcer: Managed per podiatry and wound team    6. Hypertension: Blood pressure is currently stable    -   Continue Procardia XL 30 mg twice daily    Summary of Recommendations: Thank you for your consultation. Physician Requesting Consult: Leatha Gabriel DO    Reason for Consult / Principal Problem: Junctional rhythm    Inpatient consult to Cardiology  Consult performed by: GRANT Santamaria  Consult ordered by: GRANT Mesa          HPI: Andres Adasm is a 58y.o. year old male who was admitted to the hospital on 2023, after he was found on the floor of his home and he was unsure how he got there. He believes he was laying on the floor for 2 days yelling for help. He was found by 911 after daughter called for well person check. During hospitalization, he was found to have encephalopathy, polymicrobial bacteremia and a right diabetic foot ulcer with cellulitis for which she has had debridement. Has history of end-stage renal disease and undergoes dialysis. Overnight patient became bradycardic and was placed on telemetry monitoring.   Initial twelve-lead EKG appears to be junctional, at times on telemetry appears patient has sinus bradycardia. His potassium today was 5.7. He was given IV calcium gluconate 1 g per nephrology and started on hemodialysis this morning with improvement in his heart rate. Patient only complaint was of feeling fatigued. He denied any dizziness. But activity is fairly limited due to presence of wound VAC on foot. Cardiac testing 5/14/2023 at [facility] patient had TTE: Which noted LV cavity was grossly normal in size but could not exclude regional wall motion abnormalities due to suboptimal views, left atrial size was normal, there is mild mitral valve regurgitation and right side structures were not well visualized. Lexiscan nuclear stress test performed at [facility] in 2015 was read as a nonischemic study. 8/24/2023 TTE performed here at [facility] noted EF 55% with abnormal diastolic function, left atrium was moderately dilated and there was no clear evidence of valvular vegetation or abscess. Definity was given for LV wall enhancement. Review of Systems   Constitutional: Positive for fatigue. Negative for activity change. HENT: Negative. Negative for congestion, ear discharge, postnasal drip and tinnitus. Eyes: Negative. Negative for photophobia and visual disturbance. Respiratory: Negative. Negative for chest tightness and shortness of breath. Cardiovascular: Negative. Negative for chest pain, palpitations and leg swelling. Gastrointestinal: Negative. Negative for abdominal distention, blood in stool, constipation, diarrhea, nausea and vomiting. Endocrine: Negative. Negative for polydipsia, polyphagia and polyuria. Genitourinary: Negative. Negative for difficulty urinating. Musculoskeletal: Positive for gait problem. Skin: Negative. Neurological: Positive for weakness. Negative for dizziness, syncope and light-headedness. Hematological: Negative. Psychiatric/Behavioral: Negative.         Historical Information   Past Medical History:   Diagnosis Date   • CKD    • Diabetes mellitus ([address])    • Hyperlipidemia    • Hypertension    • Left toe amputee ([address])    • TIA (transient ischemic attack)      Past Surgical History:   Procedure Laterality Date   • AMPUTATION Left     left foot resection 10 years ago dr Bowen Nogueira   • [address]  08/29/2023   • IR TEMPORARY DIALYSIS CATHETER PLACEMENT  08/25/2023   • IR TUNNELED CENTRAL LINE REMOVAL  08/23/2023   • IR TUNNELED DIALYSIS CATHETER PLACEMENT  01/27/2022   • IR TUNNELED DIALYSIS CATHETER PLACEMENT  08/30/2023   • NM AMPUTATION METATARSAL W/TOE SINGLE Right 8/31/2023    Procedure: RIGHT PARTIAL 1ST RAY RESECTION WITH  WOUND VAC APPLICATION;  Surgeon: Issa Stallings DPM;  Location: [facility];  Service: Podiatry     Social History     Substance and Sexual Activity   Alcohol Use Not Currently   • Alcohol/week: 0.0 standard drinks of alcohol    Comment: 0     Social History     Substance and Sexual Activity   Drug Use Never     Social History     Tobacco Use   Smoking Status Never   • Passive exposure: Never   Smokeless Tobacco Never     Family History: History reviewed. No pertinent family history.     Meds/Allergies    PTA meds:    Medications Prior to Admission   Medication   • acetaminophen (TYLENOL) 325 mg tablet   • atorvastatin (LIPITOR) 80 mg tablet   • carvedilol (COREG) 6.25 mg tablet   • clopidogrel (PLAVIX) 75 mg tablet   • docusate sodium (COLACE) 100 mg capsule   • famotidine (PEPCID) 10 mg tablet   • lidocaine (LIDODERM) 5 %   • linaGLIPtin 5 MG TABS   • methocarbamol (ROBAXIN) 500 mg tablet   • NIFEdipine ER (ADALAT CC) 30 MG 24 hr tablet   • oxyCODONE (ROXICODONE) 5 immediate release tablet   • predniSONE 10 mg tablet   • sevelamer (RENAGEL) 800 mg tablet   • allopurinol (ZYLOPRIM) 100 mg tablet   • naloxone (NARCAN) 4 mg/0.1 mL nasal spray      No Known Allergies    Current Facility-Administered Medications:   •  acetaminophen (TYLENOL) tablet 650 mg, 650 mg, Oral, Q6H PRN, Yazmin Yung, DPM, 650 mg at 08/20/23 1141  •  acetaminophen (TYLENOL) tablet 975 mg, 975 mg, Oral, Q8H, Won Glasgowhkarly, DPM, 975 mg at 09/03/23 0500  •  atorvastatin (LIPITOR) tablet 80 mg, 80 mg, Oral, Daily, Won Glasgowhkarly, DPM, 80 mg at 09/04/23 1717  •  calcium carbonate (TUMS) chewable tablet 1,000 mg, 1,000 mg, Oral, BID PRN, RAMEZ RichNP, 1,000 mg at 09/04/23 2235  •  famotidine (PEPCID) tablet 10 mg, 10 mg, Oral, Daily, Won Glasgowhkarly, DPM, 10 mg at 09/05/23 1043  •  gabapentin (NEURONTIN) oral solution 200 mg, 200 mg, Oral, BID, Won Glasgowhkarly, DPM, 200 mg at 09/04/23 1717  •  haloperidol lactate (HALDOL) injection 2 mg, 2 mg, Intramuscular, Q6H PRN, Yazmin Yung, DPM, 2 mg at 08/23/23 1441  •  heparin (porcine) subcutaneous injection 5,000 Units, 5,000 Units, Subcutaneous, Q8H [facility], Yazmin Yung, DPM, 5,000 Units at 09/05/23 0502  •  HYDROmorphone (DILAUDID) injection 1 mg, 1 mg, Intravenous, Q4H PRN, Yazmin Dilshad, DPM, 1 mg at 08/25/23 2337  •  insulin glargine (LANTUS) subcutaneous injection 15 Units 0.15 mL, 15 Units, Subcutaneous, HS, Won Glasgowhkarly, DPM, 15 Units at 09/04/23 2115  •  insulin lispro (HumaLOG) 100 units/mL subcutaneous injection 1-5 Units, 1-5 Units, Subcutaneous, HS, Won Glasgowhkarly, DPM, 1 Units at 09/01/23 2241  •  insulin lispro (HumaLOG) 100 units/mL subcutaneous injection 2-12 Units, 2-12 Units, Subcutaneous, TID AC, 2 Units at 09/04/23 1220 **AND** Fingerstick Glucose (POCT), , , TID ACWon DPM  •  insulin lispro (HumaLOG) 100 units/mL subcutaneous injection 5 Units, 5 Units, Subcutaneous, TID With Meals, Yazmin Yung, DPM, 5 Units at 09/04/23 1716  •  lidocaine (LIDODERM) 5 % patch 1 patch, 1 patch, Topical, Daily, Yazmin Yung, DPM, 1 patch at 08/25/23 2361  •  LORazepam (ATIVAN) injection 0.5 mg, 0.5 mg, Intravenous, Q6H PRN, Yazmin Yung, DPM, 0.5 mg at 08/31/23 0208  •  methocarbamol (ROBAXIN) tablet 500 mg, 500 mg, Oral, Q12H PRN, Ge Abernathy, DPM, 500 mg at 08/25/23 1659  •  NIFEdipine (PROCARDIA XL) 24 hr tablet 30 mg, 30 mg, Oral, BID, Won Parmar DPM, 30 mg at 09/04/23 1717  •  ondansetron (ZOFRAN) injection 4 mg, 4 mg, Intravenous, Q6H PRN, Magalyser Josemanuel, DPM, 4 mg at 09/03/23 0441  •  oxyCODONE (ROXICODONE) IR tablet 5 mg, 5 mg, Oral, Q8H PRN, Magalyser Josemanuel, DPM, 5 mg at 08/25/23 2221  •  polyethylene glycol (MIRALAX) packet 17 g, 17 g, Oral, Daily, Jeannie James PA-C, 17 g at 09/04/23 0848  •  senna-docusate sodium (SENOKOT S) 8.6-50 mg per tablet 2 tablet, 2 tablet, Oral, BID, Ge Abernathy, DPM, 2 tablet at 09/04/23 1717  •  sevelamer (RENAGEL) tablet 800 mg, 800 mg, Oral, TID With Meals, Ge Abernathy, DPM, 800 mg at 09/04/23 1717  •  vancomycin (VANCOCIN) IVPB (premix in dextrose) 750 mg 150 mL, 750 mg, Intravenous, Once per day on Tue Thu Sat, Lisa Berry PA-C    VTE Pharmacologic Prophylaxis:   Sequential compression device (Venodyne)     Objective:   Vitals: Blood pressure 139/60, pulse 68, temperature 97.9 °F (36.6 °C), temperature source Oral, resp. rate 18, height 6' 1" (1.854 m), weight 115 kg (253 lb), SpO2 98 %. Body mass index is 33.38 kg/m².   Wt Readings from Last 3 Encounters:   08/24/23 115 kg (253 lb)   07/31/22 102 kg (225 lb 6.4 oz)   07/06/22 106 kg (233 lb 11 oz)     BP Readings from Last 3 Encounters:   09/05/23 139/60   07/31/22 118/60   07/14/22 139/52     Orthostatic Blood Pressures    Flowsheet Row Most Recent Value   Blood Pressure 139/60 filed at 09/05/2023 1030   Patient Position - Orthostatic VS Lying filed at 09/05/2023 0804          Intake/Output Summary (Last 24 hours) at 9/5/2023 1043  Last data filed at 9/5/2023 0900  Gross per 24 hour   Intake 440 ml   Output 600 ml   Net -160 ml       Invasive Devices     Peripheral Intravenous Line  Duration           Peripheral IV 09/02/23 Distal;Left;Upper;Ventral (anterior) Arm 2 days          Hemodialysis Catheter  Duration HD Permanent Double Catheter 6 days                  Physical Exam:   Physical Exam  Vitals and nursing note reviewed. Constitutional:       General: He is not in acute distress. Appearance: Normal appearance. He is obese. HENT:      Right Ear: External ear normal.      Left Ear: External ear normal.      Nose: Nose normal.   Eyes:      General: No scleral icterus. Right eye: No discharge. Left eye: No discharge. Cardiovascular:      Rate and Rhythm: Regular rhythm. Bradycardia present. Pulses: Normal pulses. Heart sounds: Murmur heard. Pulmonary:      Effort: Pulmonary effort is normal. No respiratory distress. Breath sounds: Normal breath sounds. Abdominal:      General: Bowel sounds are normal. There is no distension. Palpations: Abdomen is soft. Musculoskeletal:      Right lower leg: No edema. Left lower leg: No edema. Comments: Wound VAC to right foot   Skin:     General: Skin is warm and dry. Capillary Refill: Capillary refill takes less than 2 seconds. Neurological:      General: No focal deficit present. Mental Status: He is alert and oriented to person, place, and time. Mental status is at baseline.    Psychiatric:         Mood and Affect: Mood normal.         Labs:   Troponins:  Results from last 7 days   Lab Units 09/05/23  0641   HS TNI RAND ng/L 9       CBC with diff:   Results from last 7 days   Lab Units 09/05/23  0458 09/04/23  0558 09/03/23  0456 09/02/23  0555 09/01/23  0522 08/31/23  0450 08/30/23  0553   WBC Thousand/uL 12.43* 9.99 11.97* 11.48* 14.32* 13.20* 14.70*   HEMOGLOBIN g/dL 8.9* 9.1* 10.2* 9.3* 9.3* 9.7* 9.3*   HEMATOCRIT % 28.4* 28.7* 32.3* 29.7* 30.1* 31.1* 30.0*   MCV fL 94 94 92 93 92 93 93   PLATELETS Thousands/uL 324 322 368 313 297 302 302   RBC Million/uL 3.02* 3.07* 3.53* 3.19* 3.26* 3.33* 3.23*   MCH pg 29.5 29.6 28.9 29.2 28.5 29.1 28.8   MCHC g/dL 31.3* 31.7 31.6 31.3* 30.9* 31.2* 31.0*   RDW % 17.8* 17.5* 17.5* 17.4* 17.2* 17.5* 17.2*   MPV fL 10.2 9.6 9.5 9.6 9.4 9.7 9.5       CMP:   Results from last 7 days   Lab Units 09/05/23  0458 09/04/23  0558 09/03/23  0456 09/02/23  0555 09/01/23  0522 08/31/23  0450 08/30/23  0553   SODIUM mmol/L 133* 133* 134* 134* 133* 135 134*   POTASSIUM mmol/L 5.7* 5.2 4.5 4.3 5.0 4.5 4.2   CHLORIDE mmol/L 93* 94* 93* 97 98 101 100   CO2 mmol/L 31 30 32 28 25 25 25   ANION GAP mmol/L 9 9 9 9 10 9 9   BUN mg/dL 61* 44* 29* 41* 31* 47* 38*   CREATININE mg/dL 7.93* 6.61* 5.23* 7.03* 5.85* 7.76* 6.49*   CALCIUM mg/dL 8.5 8.5 8.7 8.0* 8.2* 8.3* 8.2*   EGFR ml/min/1.73sq m 6 8 10 7 9 6 8   GLUCOSE RANDOM mg/dL 113 121 127 132 164* 106 132       Magnesium:   Results from last 7 days   Lab Units 08/31/23  0450   MAGNESIUM mg/dL 2.2     Coags:     TSH:      No components found for: "TSH3"  Lipid Profile:     Lipid Profile:   No results found for: "CHOLESTEROL", "HDL", "LDLCALC", "TRIG"  Hgb A1c:     NT-proBNP: No results for input(s): "NTBNP" in the last 72 hours. Imaging & Testing       Imaging: I have personally reviewed pertinent reports. VAS lower limb arterial duplex, limited, unilateral    Result Date: 9/1/2023  Narrative:  [facility] REPORT CLINICAL: Indications: Patient presents with an ulcer on his right plantar which started several days ago. Patient denies any pain with walking. Operative History: 2023-08-30 Right tunneled dialysis catheter placement 2023-08-25 dialysis catheter placement Left toe amputation date unknown Risk Factors The patient has history of HTN, Diabetes (NIDDM (oral meds)) and CKD. Clinical Right Pressure:  145/ mm Hg, Left Pressure:  139/ mm Hg.   FINDINGS:  Right                  Impression       PSV (cm/s)  Common Femoral Artery  Diffuse Disease         127  Prox Profunda                                   64  Prox SFA                                       135  Mid SFA                                        135  Dist SFA 106  Proximal Pop                                   262  Distal Pop                                      91  Tibioperoneal          <50%                    282  Prox Post Tibial                                83  Dist Post Tibial                               103  Prox. Ant. Tibial                              127  Dist. Ant. Tibial                              137  Prox Peroneal                                  103  Dist Peroneal                                   77     CONCLUSION: Impression: RIGHT LOWER LIMB: Diffuse disease noted throughout the femoral-popliteal arteries without significant focal stenosis. Evidence suggestive of TIb/Peroneal occlusive disease with a <50% stenosis at the proximal tibioperoneal trunk. Ankle/Brachial index: 1.66 , which unreliable due to non-compressible vessels. ( Prior: unreliable due to non-compressible vessels.) Metatarsal pressure of 205mmHg (Prior +200mmHg) Great toe pressure of 73 mmHg, within the normal healing range. (Prior 95 mmHg) PVR/ PPG tracings are normal.  LEFT LOWER LIMB: Ankle/Brachial index: 1.66, which is unreliable due to non-compressible vessels. ( Prior: unreliable due to non-compressible vessels.) Metatarsal pressure of  +240mmHg Great toe pressure of 72 mmHg, within the normal healing range. PVR/ PPG tracings are normal.  Compared to previous study on 8/24/2023, there is no significant change. Recommend repeat testing in 3 months as per protocol unless otherwise indicated. SIGNATURE: Electronically Signed by: Aldo Bernabe MD on 2023-09-01 04:19:13 PM    XR foot right 3+ views    Result Date: 9/1/2023  Narrative: RIGHT FOOT INDICATION:  Postoperative imaging. COMPARISON: 8/26/2023 VIEWS:  XR FOOT 3+ VW RIGHT FINDINGS: Status post transmetatarsal amputation first ray. There is no acute fracture or dislocation. Lateral deviation of the second through fourth toes again noted.  Extensive degenerative changes and bony fusion seen along the midfoot with hindfoot degenerative changes as well. Calcaneal spurs. No lytic or blastic osseous lesion. There are atherosclerotic calcifications. Wound VAC overlying the postoperative bed. Impression: Unremarkable postoperative appearance. Workstation performed: [id number]     IR tunneled dialysis catheter placement    Result Date: 8/30/2023  Narrative: PROCEDURE: Conversion of non-tunneled to tunneled central venous catheter PROCEDURE SUMMARY: - Temporary central venous catheter removal - Tunneled central venous catheter insertion with fluoroscopic guidance - Additional procedure(s): None INDICATION: Convert the temporary dialysis catheter to a tunneled catheter. Placed by IR on 8/25. Sarah Jones COMPLICATIONS: No immediate complications. ESTIMATED BLOOD LOSS (mL): Less than 10 CONTRAST: None. RADIATION DOSE: Fluoroscopy time: 0.4 min Reference air kerma: 4 mGy ANESTHESIA/SEDATION: Level of anesthesia/sedation: No sedation Anesthesia/sedation administered by: Not applicable Total intra-service sedation time (minutes): 0 PROCEDURE DETAILS: History and imaging of central venous access reviewed (QCDR): Yes Informed consent for the procedure including risks, benefits and alternatives was obtained and time-out was performed prior to the procedure. The site was prepared and draped using all elements of maximal sterile barrier technique including sterile gloves, sterile gown, cap, mask, large sterile sheet, sterile ultrasound probe cover, hand hygiene and cutaneous antisepsis with 2% chlorhexidine. Local anesthesia was administered. A wire was passed through the indwelling central venous catheter and into the central veins. The catheter was removed, and a peel-away sheath was placed. An incision was made near the venous access site and the catheter  was tunneled subcutaneously to the venous access site. The catheter was advanced via a peel-away sheath into the vein under fluoroscopic guidance.  Catheter tip location was fluoroscopically verified and a permanent image was stored. Catheter placed: 15.5 Portuguese by 28 cm (23 cuff-to-tip) Catheter size [place]): 15.5 [place] Catheter flush: Normal saline The access site was closed and a sterile bandage was applied. Access site closure technique: Tissue adhesive Catheter securement technique: Non-absorbable suture     Impression: Conversion of right-sided internal jugular non-tunneled central venous catheter for a tunneled dialysis catheter, with tip in the expected location of the right atrium. Plan: The catheter may be used immediately. Workstation performed: [id number]     IR lower extremity angiogram    Result Date: 8/29/2023  Narrative: Abdominal aortogram Pelvic angiogram Right lower extremity angiogram History: Osteomyelitis right foot with nonhealing ulcer Contrast: 85 mL of iodixanol (VISIPAQUE) Fluoro time: 5.3 min  DAP - 45.39 Number of Images: Multiple Radiation Dose: 260 mGy Conscious sedation time:  1 HOUR Technique: The patient was brought to the interventional radiology suite and identified verbally and by wrist band. The patient was placed supine on the table and the left groin was prepped and draped in the usual sterile fashion. Lidocaine was administered to the skin and a small skin incision was made. The left common femoral artery was then punctured percutaneously utilizing Seldinger technique. A Vertigo wire was advanced into the abdominal aorta over which a 5 [place] vascular sheath was inserted and connected to a flush bag. A 4 Portuguese omni flush catheter was then advanced to the level of T12 and a digital subtraction abdominal aortogram was performed.  The catheter was pulled down to the distal aorta for a pelvic angiogram and then placed up and over the bifurcation into the right superficial femoral artery for right lower extremity angiogram. The catheter was removed and a left common femoral angiogram was performed through the sheath followed by left common femoral arteriotomy closure using the ProGlide Perclose closure device. The patient tolerated the procedure well including conscious sedation with no immediate complication seen. Impression: Impression: Normal abdominal aortogram and pelvic angiogram except for a small focal atherosclerotic area of the right common iliac artery. The mid popliteal artery demonstrates a less than 50% stenosis. The distal popliteal artery demonstrates a 30% stenosis. The origin of the anterior tibial artery demonstrates a focal moderate stenosis. None of these areas appear flow-limiting. Otherwise widely patent three-vessel runoff with widely patent anterior tibial and posterior tibial arteries in the foot. The anterior and posterior tibial arteries in the foot demonstrate distal vessel disease. Workstation performed: [id number]     XR foot 3+ vw right    Result Date: 8/28/2023  Narrative: RIGHT FOOT INDICATION:   Osteomyelitis right first ray. COMPARISON: MRI right foot 8/23/2023 VIEWS:  XR FOOT 3+ VW RIGHT FINDINGS: There is no acute fracture or dislocation. Unchanged severe first metatarsophalangeal osteoarthritis. Previously reported marrow changes on MRI are radiographically occult. 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 Retrocalcaneal enthesophyte again noted. No periosteal reaction or cortical destruction to suggest osteomyelitis. Atherosclerotic calcifications. No soft tissue gas or radiopaque foreign body. Impression: No definite radiographic evidence of osteomyelitis. Previously reported marrow changes on MRI are radiographically occult. Workstation performed: [id number]     IR temporary dialysis catheter placement    Result Date: 8/25/2023  Narrative: Temporary dialysis catheter insertion Clinical History: Bacteremia, status post tunneled catheter removal on 8/23. Fluoroscopy time: 0.1 minutes.  Procedure: After explaining the risks and benefits of the procedure to the patient, informed consent was obtained. All elements of maximal sterile barrier technique were followed (cap, mask, sterile gown, sterile gloves, large sterile sheet, hand hygiene, and 2% chlorhexidine for cutaneous antisepsis). The patient's right internal jugular vein was evaluated as a potential access site. The vein is patent, compressible, and free of thrombus. Under ultrasound guidance, a 21-gauge needle was used to access the internal jugular vein and dilated, through which a multipurpose 035 J-wire was advanced centrally under fluoroscopic guidance. A static ultrasound image was recorded. After dilating the tract, a 14 Nepali 15 cm temporary dialysis catheter was inserted with its tip at the RA/SVC junction under fluoroscopic guidance. The catheter was secured in place with sutures and flushed per protocol. The patient tolerated the procedure well and left the department in stable condition. Impression: Impression: Successful temporary dialysis catheter insertion. Workstation performed: [id number]     Echo complete w/ contrast if indicated    Result Date: 8/25/2023  Narrative: •  Left Ventricle: Left ventricular cavity size is normal. Wall thickness is mildly increased. The left ventricular ejection fraction is 55% by visual estimation. . Systolic function is low normal. Wall motion is normal. Diastolic function is abnormal. •  IVS: There is sigmoid appearance of the septum. •  Right Ventricle: Systolic function is normal. Normal tricuspid annular plane systolic excursion (TAPSE) > 1.7 cm. •  Left Atrium: The atrium is moderately dilated. Technically difficult study- no clear evidence of valvular vegetation or abscess. Definity given for LV wall enhancement.      VAS lower limb arterial duplex, complete bilateral    Result Date: 8/24/2023  Narrative:  [facility] REPORT CLINICAL: Indications: Physician wants to determine patency of the arterial system secondary to plantar foot wounds and patient is Diabetic. Operative History: No prior cardiovascular surgeries. Risk Factors: The patient has history of HTN, Diabetes (NIDDM (oral meds)) and CKD. FINDINGS:  Segment                Right                   Left                                          Impression  PSV (cm/s)  Impression  PSV (cm/s)  Common Femoral Artery                     126                     128  Prox Profunda                             148                     110  Prox SFA                                  126                     126  Mid SFA                                   139                      96  Dist SFA                                   99                      94  Proximal Pop           <50%               272  50-75%             374  Distal Pop                                135                     198  Tibioperoneal          <50%               293                      43  Dist Post Tibial                          119                      28  Dist. Ant. Tibial                         128                      72  Dist Peroneal                              64                      44     CONCLUSION: Impression: RIGHT LOWER LIMB: Diffuse atherosclerotic disease noted throughout with elevated PSV of the proximal popliteal artery and tibioperoneal trunk suggesting a <50% stenosis. Ankle/Brachial index: unreliable due to non-compressible vessels. No Prior. PVR/ PPG tracings are normal. Metatarsal pressure of >200 mmHg. Great toe pressure of 95 mmHg, within the healing range. LEFT LOWER LIMB: Diffuse atherosclerotic disease noted throughout with a 50-75 % stenosis of the proximal popliteal artery. Ankle/Brachial index: unreliable due to non-compressible vessels. No Prior. PVR/ PPG tracings are normal. Metatarsal pressure of 170 mmHg. Great toe pressure of 98 mmHg, within the healing range.   SIGNATURE: Electronically Signed by: Ursula Fitzgerald MD, RPVI on 2023-08-24 05:40:03 PM    XR foot 3+ vw right    Result Date: 8/24/2023  Narrative: RIGHT FOOT INDICATION:   Rule out osteomyelitis. COMPARISON:  None VIEWS:  XR FOOT 3+ VW RIGHT Images: 3 FINDINGS: Marked degenerative changes of the first MTP joint with hallux valgus deformity. Hypertrophic osteoarthritic changes of the midfoot articulations with possible areas of bony ankylosis near the base of the first through third metatarsals. Atherosclerotic calcifications. Posterior calcaneal spurring. There may be soft tissue swelling in the plantar soft tissues adjacent to the first MTP joint. No acute fracture or dislocation. Impression: 1. Possible soft tissue swelling in the soft tissues adjacent to the first MTP joint. Correlate with physical signs for infection or inflammation. 2. No radiographic evidence of osteomyelitis however radiographic changes are typically delayed. If this is of continued clinical concern further assessment with MRI and/or three-phase bone scan may be obtained for further assessment. 3. Extensive arthritic changes in the foot as discussed above. Workstation performed: [id number]     XR foot 2 vw left    Result Date: 8/24/2023  Narrative: LEFT FOOT INDICATION:   Diabetic foot ulcer submetatarsal 1 left foot. . COMPARISON:  None VIEWS:  XR FOOT 2 VW LEFT FINDINGS: There is no acute fracture or dislocation. Calcaneal spur(s) noted. Dorsal midfoot bony productive change. Old fracture deformity of the distal tibia. Hallux valgus. Degenerative changes at the first metatarsophalangeal joint. No lytic or blastic osseous lesion. Soft tissues are unremarkable. Vascular calcifications. Impression: No definite radiographic evidence of osteomyelitis. If there is clinical concern, MRI can be obtained. Workstation performed: [id number]     IR tunneled central line removal    Result Date: 8/24/2023  Narrative: Perma-Cath removal. Clinical History: Patient presenting for Perma-Cath removal due to bacteremia. The existing catheter was prepped and draped in the usual sterile fashion. Lidocaine was administered the skin and subcutaneous tissues. The cuff was dissected free, and the catheter was removed. Manual compression was applied to the puncture site and tunnel until hemostasis was achieved. The patient tolerated the procedure well and suffered no complications. Impression: Impression: Successful Perma-Cath removal as described. Workstation performed: [id number]     XR chest portable    Result Date: 8/24/2023  Narrative: CHEST INDICATION:   Rule out pneumonia. COMPARISON: Radiograph chest 1/21/2022 EXAM PERFORMED/VIEWS:  XR CHEST PORTABLE FINDINGS: Right-sided dialysis catheter with distal tip overlying the right atrium. Cardiomediastinal silhouette appears unremarkable. Left basilar subsegmental atelectasis. Increased reticular opacity in the medial aspect of the right lung base compared to 1/21/2022. No pneumothorax or pleural effusion. Old fracture deformity of the right clavicular shaft. .     Impression: Increased reticular opacity in the medial aspect of the right lung base compared to 1/21/2022, which can represent infectious pneumonitis, aspiration pneumonitis or subsegmental atelectasis. Clinical correlation is recommended. The study was marked in [employer] for significant notification. Workstation performed: [id number]     MRI foot/forefoot toes right wo contrast    Result Date: 8/23/2023  Narrative: MRI FOOT/FOREFOOT TOES RIGHT WO CONTRAST INDICATION:   r/o osteomyelitis. COMPARISON: Plain film 8/21/2023. TECHNIQUE:  Multiplanar/multisequence MR of the right foot was performed. FINDINGS: SUBCUTANEOUS TISSUES: First submetatarsal skin ulcer with fluid extending to the adjacent joint space likely reflecting sinus tract. BONES: Advanced talonavicular degenerative change with dorsal spurring and mild subluxation that may be neuropathic. Midfoot degenerative change consistent with diabetic history there appears to be partial fusion across the tarsometatarsal joints.  FIRST MTP JOINT: Prominent first metatarsal phalangeal joint degenerative change with joint effusion noted. SESAMOID BONES: Tibial sesamoid demonstrates diffuse T1 replacement and increased T2 signal concerning for osteomyelitis with drainage from the tibial sesamoid to the plantar skin surface. The fibular sesamoid also demonstrates some T1 replacement with increased T2 signal again concerning for osteomyelitis. Minor marrow changes across the first metatarsal phalangeal joint. OTHER ARTICULAR SURFACE:  Normal. PLANTAR FASCIA:  Intact. LISFRANC LIGAMENT:  Intact. FOREFOOT TENDONS: Intact. INTERMETATARSAL REGIONS: No bursitis or Escalante's neuroma. MUSCULATURE: Fatty atrophic change throughout the right foot musculature consistent with diabetic history. Impression: First submetatarsal head plantar skin ulceration with suspected draining sinus tract extending to the first metatarsal phalangeal joint. Findings raise suspicion for septic arthropathy including marrow changes involving both the tibial and fibular sesamoid bones consistent with osteomyelitis. Minimal marrow changes across the first metatarsal phalangeal joint at this time. The study was marked in [facility] for immediate notification. Workstation performed: [id number]     MRI brain wo contrast    Result Date: 8/22/2023  Narrative: MRI BRAIN WITHOUT CONTRAST INDICATION: stroke. COMPARISON:   CTA head and neck 8/20/2023 TECHNIQUE:  Multiplanar, multisequence imaging of the brain was performed. IMAGE QUALITY: Motion degraded examination. FINDINGS: BRAIN PARENCHYMA:  There is no discrete mass, mass effect or midline shift. There is no intracranial hemorrhage. There is no evidence of acute infarction and diffusion imaging is unremarkable. VENTRICLES:  Normal for the patient's age. SELLA AND PITUITARY GLAND: Evidence of a partially empty sella. Impression: Limited, motion degraded examination. There is no evidence for an acute territorial infarct.  Workstation performed: [id number]     CTA head and neck w wo contrast    Result Date: 8/20/2023  Narrative: CTA NECK AND BRAIN WITH AND WITHOUT CONTRAST INDICATION: Syncope, simple, abnormal neuro exam Head trauma, abnormal mental status (Age 19-64y) Altered mental status COMPARISON:   None. TECHNIQUE:  Routine CT imaging of the Brain without contrast.  Post contrast imaging was performed after administration of iodinated contrast through the neck and brain. Post contrast axial 0.625 mm images timed to opacify the arterial system. 3D rendering was performed on an independent workstation. MIP reconstructions performed. Coronal reconstructions were performed of the noncontrast portion of the brain. Radiation dose length product (DLP) for this visit:  1385 mGy-cm . This examination, like all CT scans performed in the [facility], was performed utilizing techniques to minimize radiation dose exposure, including the use of iterative reconstruction and automated exposure control. IV Contrast:  85 mL of iohexol (OMNIPAQUE) IMAGE QUALITY:   Diagnostic FINDINGS: NONCONTRAST BRAIN PARENCHYMA: Mild anterior periventricular hypodensities, likely chronic microangiopathy. Tiny hypodensity right posterior inferior putamen, likely chronic lacunar infarct or dilated perivascular space. No intracranial mass, mass effect or midline shift. No CT signs of acute infarction. No acute parenchymal hemorrhage. VENTRICLES AND EXTRA-AXIAL SPACES:  Normal for the patient's age. VISUALIZED ORBITS: Normal visualized orbits. PARANASAL SINUSES: Normal visualized paranasal sinuses. CERVICAL VASCULATURE AORTIC ARCH AND GREAT VESSELS:  Normal aortic arch and great vessel origins. Minimal calcified atherosclerotic disease in proximal left subclavian artery. Otherwise, normal visualized subclavian arteries. RIGHT VERTEBRAL ARTERY CERVICAL SEGMENT:  Normal origin. The vessel is normal in caliber throughout the neck.  LEFT VERTEBRAL ARTERY CERVICAL SEGMENT:  Normal origin. The vessel is normal in caliber throughout the neck. RIGHT EXTRACRANIAL CAROTID SEGMENT:  Mild calcified atherosclerotic disease of the distal common carotid artery and proximal cervical internal carotid artery without significant stenosis compared to the more distal ICA. Less than 50% stenosis. No dissection. LEFT EXTRACRANIAL CAROTID SEGMENT: Mild calcified atherosclerotic disease of the distal common carotid artery and proximal cervical internal carotid artery without significant stenosis compared to the more distal ICA. Less than 50% stenosis. No dissection. NASCET criteria was used to determine the degree of internal carotid artery diameter stenosis. INTRACRANIAL VASCULATURE INTERNAL CAROTID ARTERIES:  Normal enhancement of the intracranial portions of the internal carotid arteries with mild calcified atherosclerotic disease bilaterally. Normal ophthalmic artery origins. Normal ICA terminus. ANTERIOR CIRCULATION:  Symmetric A1 segments and anterior cerebral arteries with normal enhancement. Normal anterior communicating artery. MIDDLE CEREBRAL ARTERY CIRCULATION:  M1 segment and middle cerebral artery branches demonstrate normal enhancement bilaterally. DISTAL VERTEBRAL ARTERIES: Patent distal vertebral arteries. Mild calcified atherosclerotic disease in bilateral vertebral artery V4 segments (left worse than right). Posterior inferior cerebellar artery origins are normal. Normal vertebral basilar junction. BASILAR ARTERY:  Basilar artery is normal in caliber. Normal superior cerebellar arteries. POSTERIOR CEREBRAL ARTERIES: Both posterior cerebral arteries arises from the basilar tip. Both arteries demonstrate normal enhancement. Normal posterior communicating arteries. VENOUS STRUCTURES:  Normal. NON VASCULAR ANATOMY BONY STRUCTURES:  No acute osseous abnormality. Mild-to-moderate multilevel degenerative changes cervical spine with facet arthropathy, worse at C5-C6.  Chronic C7 superior endplate compression fracture deformity with mild height loss. SOFT TISSUES OF THE NECK:  Unremarkable. THORACIC INLET: Large bore right jugular approach central venous catheter tip coursing out of field-of-view inferiorly into superior vena cava. No focal consolidation in visualized upper lung zones. Collapse of posterior aspect of trachea with narrowed tracheal airway, possibly due to tracheobronchomalacia. Impression: No acute intracranial abnormality. Tiny hypodensity right posterior inferior putamen, likely chronic lacunar infarct or dilated perivascular space. Negative CTA head and neck for large vessel occlusion, dissection, aneurysm, or high-grade stenosis. Possible tracheobronchomalacia. Additional chronic/incidental findings as detailed above. The study was marked in [facility] for immediate notification. Workstation performed: [id number]       EKG/ Monitor: Personally reviewed. Junctional rhythm     Code Status: Level 1 - Full Code  Advance Directive and Living Will:      POLST:        Vonnie Gomez       "This note was completed in part utilizing [employer] direct voice recognition software. Grammatical errors, random word insertion, spelling mistakes, and incomplete sentences may be an occasional consequence of the system secondary to software limitations, ambient noise and hardware issues. Please read the chart carefully and recognize, using context, where substitutions have occurred.   If you have any questions or concerns about the context, text or information contained within the body of this dictation, please contact myself, the provider, for further clarification."

## 2023-09-05 NOTE — ASSESSMENT & PLAN NOTE
· Hold Procardia 30 mg p.o. twice daily, Coreg 12.5 mg p.o. twice daily given bradycardia 9/5 AM  · Vital signs as per unit routine

## 2023-09-06 PROBLEM — K59.00 CONSTIPATION: Status: ACTIVE | Noted: 2023-09-06

## 2023-09-06 LAB
ANION GAP SERPL CALCULATED.3IONS-SCNC: 7 MMOL/L
BUN SERPL-MCNC: 34 MG/DL (ref 5–25)
CALCIUM SERPL-MCNC: 8.4 MG/DL (ref 8.4–10.2)
CHLORIDE SERPL-SCNC: 98 MMOL/L (ref 96–108)
CO2 SERPL-SCNC: 29 MMOL/L (ref 21–32)
CREAT SERPL-MCNC: 5.13 MG/DL (ref 0.6–1.3)
ERYTHROCYTE [DISTWIDTH] IN BLOOD BY AUTOMATED COUNT: 17.4 % (ref 11.6–15.1)
GFR SERPL CREATININE-BSD FRML MDRD: 10 ML/MIN/1.73SQ M
GLUCOSE SERPL-MCNC: 103 MG/DL (ref 65–140)
GLUCOSE SERPL-MCNC: 111 MG/DL (ref 65–140)
GLUCOSE SERPL-MCNC: 121 MG/DL (ref 65–140)
GLUCOSE SERPL-MCNC: 156 MG/DL (ref 65–140)
HCT VFR BLD AUTO: 28.4 % (ref 36.5–49.3)
HGB BLD-MCNC: 8.8 G/DL (ref 12–17)
MAGNESIUM SERPL-MCNC: 2.1 MG/DL (ref 1.9–2.7)
MCH RBC QN AUTO: 28.9 PG (ref 26.8–34.3)
MCHC RBC AUTO-ENTMCNC: 31 G/DL (ref 31.4–37.4)
MCV RBC AUTO: 93 FL (ref 82–98)
PHOSPHATE SERPL-MCNC: 4.1 MG/DL (ref 2.3–4.1)
PLATELET # BLD AUTO: 292 THOUSANDS/UL (ref 149–390)
PMV BLD AUTO: 9.9 FL (ref 8.9–12.7)
POTASSIUM SERPL-SCNC: 4.9 MMOL/L (ref 3.5–5.3)
RBC # BLD AUTO: 3.04 MILLION/UL (ref 3.88–5.62)
SODIUM SERPL-SCNC: 134 MMOL/L (ref 135–147)
WBC # BLD AUTO: 9.02 THOUSAND/UL (ref 4.31–10.16)

## 2023-09-06 PROCEDURE — 80048 BASIC METABOLIC PNL TOTAL CA: CPT | Performed by: INTERNAL MEDICINE

## 2023-09-06 PROCEDURE — 84100 ASSAY OF PHOSPHORUS: CPT | Performed by: INTERNAL MEDICINE

## 2023-09-06 PROCEDURE — 99239 HOSP IP/OBS DSCHRG MGMT >30: CPT

## 2023-09-06 PROCEDURE — 83735 ASSAY OF MAGNESIUM: CPT | Performed by: INTERNAL MEDICINE

## 2023-09-06 PROCEDURE — 85027 COMPLETE CBC AUTOMATED: CPT | Performed by: INTERNAL MEDICINE

## 2023-09-06 PROCEDURE — 82948 REAGENT STRIP/BLOOD GLUCOSE: CPT

## 2023-09-06 PROCEDURE — 99232 SBSQ HOSP IP/OBS MODERATE 35: CPT | Performed by: INTERNAL MEDICINE

## 2023-09-06 RX ORDER — INSULIN GLARGINE 100 [IU]/ML
15 INJECTION, SOLUTION SUBCUTANEOUS
Qty: 10 ML | Refills: 0
Start: 2023-09-06

## 2023-09-06 RX ORDER — POLYETHYLENE GLYCOL 3350 17 G/17G
17 POWDER, FOR SOLUTION ORAL DAILY
Refills: 0
Start: 2023-09-07

## 2023-09-06 RX ORDER — INSULIN LISPRO 100 [IU]/ML
5 INJECTION, SOLUTION INTRAVENOUS; SUBCUTANEOUS
Refills: 0
Start: 2023-09-06

## 2023-09-06 RX ORDER — AMOXICILLIN 250 MG
2 CAPSULE ORAL 2 TIMES DAILY
Refills: 0
Start: 2023-09-06

## 2023-09-06 RX ORDER — LIDOCAINE 50 MG/G
1 PATCH TOPICAL DAILY
Refills: 0
Start: 2023-09-07

## 2023-09-06 RX ADMIN — INSULIN LISPRO 5 UNITS: 100 INJECTION, SOLUTION INTRAVENOUS; SUBCUTANEOUS at 12:25

## 2023-09-06 RX ADMIN — GABAPENTIN 200 MG: 250 SUSPENSION ORAL at 17:24

## 2023-09-06 RX ADMIN — SEVELAMER HYDROCHLORIDE 800 MG: 800 TABLET ORAL at 12:23

## 2023-09-06 RX ADMIN — FAMOTIDINE 10 MG: 20 TABLET, FILM COATED ORAL at 10:00

## 2023-09-06 RX ADMIN — HEPARIN SODIUM 5000 UNITS: 5000 INJECTION INTRAVENOUS; SUBCUTANEOUS at 14:29

## 2023-09-06 RX ADMIN — ATORVASTATIN CALCIUM 80 MG: 80 TABLET, FILM COATED ORAL at 17:20

## 2023-09-06 RX ADMIN — NIFEDIPINE 30 MG: 30 TABLET, EXTENDED RELEASE ORAL at 17:20

## 2023-09-06 RX ADMIN — INSULIN LISPRO 2 UNITS: 100 INJECTION, SOLUTION INTRAVENOUS; SUBCUTANEOUS at 12:24

## 2023-09-06 RX ADMIN — SEVELAMER HYDROCHLORIDE 800 MG: 800 TABLET ORAL at 10:08

## 2023-09-06 RX ADMIN — NIFEDIPINE 30 MG: 30 TABLET, EXTENDED RELEASE ORAL at 10:01

## 2023-09-06 RX ADMIN — SEVELAMER HYDROCHLORIDE 800 MG: 800 TABLET ORAL at 17:20

## 2023-09-06 RX ADMIN — INSULIN LISPRO 5 UNITS: 100 INJECTION, SOLUTION INTRAVENOUS; SUBCUTANEOUS at 10:03

## 2023-09-06 RX ADMIN — HEPARIN SODIUM 5000 UNITS: 5000 INJECTION INTRAVENOUS; SUBCUTANEOUS at 05:02

## 2023-09-06 RX ADMIN — GABAPENTIN 200 MG: 250 SUSPENSION ORAL at 10:01

## 2023-09-06 RX ADMIN — SENNOSIDES AND DOCUSATE SODIUM 2 TABLET: 50; 8.6 TABLET ORAL at 10:05

## 2023-09-06 RX ADMIN — SENNOSIDES AND DOCUSATE SODIUM 2 TABLET: 50; 8.6 TABLET ORAL at 17:20

## 2023-09-06 RX ADMIN — INSULIN LISPRO 5 UNITS: 100 INJECTION, SOLUTION INTRAVENOUS; SUBCUTANEOUS at 17:25

## 2023-09-06 NOTE — PHYSICAL THERAPY NOTE
PT Cancellation Note       09/06/23 1034   Note Type   Note Type Cancelled Session   Cancel Reasons Other  (Attemtped to see pt 3x this AM. On first attempt pt eating breakfast and wants PT to return later. On second attempt pt with nursing and on third attempt pt sleeping in bed.  Will follow-up as schedule allows.)   Licensure   [place] License Number  Lidia Elias [id number]

## 2023-09-06 NOTE — PLAN OF CARE
Problem: Potential for Falls  Goal: Patient will remain free of falls  Description: INTERVENTIONS:  - Educate patient/family on patient safety including physical limitations  - Instruct patient to call for assistance with activity   - Consult OT/PT to assist with strengthening/mobility   - Keep Call bell within reach  - Keep bed low and locked with side rails adjusted as appropriate  - Keep care items and personal belongings within reach  - Initiate and maintain comfort rounds  - Make Fall Risk Sign visible to staff  - Apply yellow socks and bracelet for high fall risk patients  - Consider moving patient to room near nurses station  Outcome: Progressing     Problem: MOBILITY - ADULT  Goal: Maintain or return to baseline ADL function  Description: INTERVENTIONS:  - Educate patient/family on patient safety including physical limitations  - Instruct patient to call for assistance with activity   - Consult OT/PT to assist with strengthening/mobility   - Keep Call bell within reach  - Keep bed low and locked with side rails adjusted as appropriate  - Keep care items and personal belongings within reach  - Initiate and maintain comfort rounds  - Make Fall Risk Sign visible to staff  - Apply yellow socks and bracelet for high fall risk patients  - Consider moving patient to room near nurses station  Outcome: Progressing     Problem: PAIN - ADULT  Goal: Verbalizes/displays adequate comfort level or baseline comfort level  Description: Interventions:  - Encourage patient to monitor pain and request assistance  - Assess pain using appropriate pain scale  - Administer analgesics based on type and severity of pain and evaluate response  - Implement non-pharmacological measures as appropriate and evaluate response  - Consider cultural and social influences on pain and pain management  - Notify physician/advanced practitioner if interventions unsuccessful or patient reports new pain  Outcome: Progressing     Problem: INFECTION - ADULT  Goal: Absence or prevention of progression during hospitalization  Description: INTERVENTIONS:  - Assess and monitor for signs and symptoms of infection  - Monitor lab/diagnostic results  - Monitor all insertion sites, i.e. indwelling lines, tubes, and drains  - Monitor endotracheal if appropriate and nasal secretions for changes in amount and color  - [place] appropriate cooling/warming therapies per order  - Administer medications as ordered  - Instruct and encourage patient and family to use good hand hygiene technique  - Identify and instruct in appropriate isolation precautions for identified infection/condition  Outcome: Progressing     Problem: INFECTION - ADULT  Goal: Absence of fever/infection during neutropenic period  Description: INTERVENTIONS:  - Monitor WBC    Outcome: Progressing     Problem: SAFETY ADULT  Goal: Patient will remain free of falls  Description: INTERVENTIONS:  - Educate patient/family on patient safety including physical limitations  - Instruct patient to call for assistance with activity   - Consult OT/PT to assist with strengthening/mobility   - Keep Call bell within reach  - Keep bed low and locked with side rails adjusted as appropriate  - Keep care items and personal belongings within reach  - Initiate and maintain comfort rounds  - Make Fall Risk Sign visible to staff  - Apply yellow socks and bracelet for high fall risk patients  - Consider moving patient to room near nurses station  Outcome: Progressing

## 2023-09-06 NOTE — ASSESSMENT & PLAN NOTE
Lab Results   Component Value Date    HGBA1C 6.6 (H) 07/07/2022       Recent Labs     09/05/23  1616 09/05/23  2201 09/06/23  0757 09/06/23  1139   POCGLU 169* 92 103 156*       Blood Sugar Average: Last 72 hrs:  (P) [id number]   · Patient is on linagliptin at home  · Continue Humalog sliding scale with Accu-Cheks before every meal and at bedtime  · diabetic diet  · lantus 15 hs and lispro 5 units tid with meals

## 2023-09-06 NOTE — ASSESSMENT & PLAN NOTE
· EKG shows junctional oneil HR 38  · Asymptomatic  · BP stable  · Trop negative  · Potassium noted to be 5.7, did receive calcium and dialysis with improvement of rate  · Cardiology consulted  · Stable for discharge from cardiology perspective  · discontinue Coreg, continue procardia

## 2023-09-06 NOTE — PROGRESS NOTES
[address] Darlene [address]allero NOTE   Monica Tavarez 58 y.o. male MRN: [id number]  Unit/Bed#: [address] [place] [address] Encounter: [id number]  Reason for Consult: ESRD    ASSESSMENT and PLAN:    76-year-old male with a past medical history of ESRD, hypertension, CAD, TIA, diabetes, who initially presents with change in mental state. Nephrology on board for ESRD.     1-ESRD-     - Outpatient schedule TTS  - Outpatient unit [facility]  - Last dialysis Saturday, September 2  -Access-new PermCath placed on August 30  - September 5- completed dialysis. Junctional rhythm. Carvedilol held. - September 6- sodium and potassium are stable  And appropriate.     Plan  - Next dialysis tomorrow from renal standpoint  - Vancomycin dosing per primary team  - Review renal plans of no changes today with primary team advanced practitioner and we are in agreement     2-polymicrobial bacteremia-     - enterobacter/proteus/streptococcus/graulicatella  - Permacath removed due to bacteremia but possible source was likely foot ulceration.  - New PermCath placed August 30  - Status post first ray partial resection August 31 wound VAC  - Per primary team     3-electrolytes-     - Hyperkalemia- resolved with dialysis. Low potassium diet.     4-bradycardia- patient appears to be in junctional rhythm. Potassium was only 5.7 and generally would not expect to see changes in EKG due to potassium at this level but we gave calcium gluconate in a.m 9/5 and completed dialysis with improvement in potassium.    - Hold carvedilol for now  Cardiology team on board and monitoring-     5-acid/base-bicarbonate stable     Portions of the record may have been created with voice recognition software. Occasional wrong word or "sound a like" substitutions may have occurred due to the inherent limitations of voice recognition software. Read the chart carefully and recognize, using context, where substitutions have occurred. If you have any questions, please contact the dictating provider. SUBJECTIVE / 24H INTERVAL HISTORY:  Blood pressures 1 34-5 85H systolic. Afebrile. Patient has no complaints. Heart rate has resolved to 60s. OBJECTIVE:  Current Weight: Weight - Scale: 115 kg (253 lb)  Vitals:    09/05/23 1538 09/05/23 1921 09/05/23 2157 09/06/23 0415   BP: 140/63 (!) 120/43 157/70    BP Location:       Pulse: 66  67 64   Resp:   19    Temp: 97.8 °F (36.6 °C) 97.9 °F (36.6 °C)     TempSrc:       SpO2: 99%   92%   Weight:       Height:           Intake/Output Summary (Last 24 hours) at 9/6/2023 1723  Last data filed at 9/5/2023 2203  Gross per 24 hour   Intake 300 ml   Output 2700 ml   Net -2400 ml     General: NAD  Skin: no rash  Eyes: anicteric sclera  ENT: moist mucous membrane  Neck: supple  Chest: CTA b/l, no ronchii, no wheeze, no rubs, no rales  CVS: s1s2, no murmur, no gallop, no rub  Abdomen: soft, nontender, nl sounds  Extremities: Trace lower extremity edema, right lower extremity wrapped in wound VAC. : no gaitan  Neuro: AAOX3  Psych: normal affect  - Tunneled catheter without erythema or drainage.     Medications:    Current Facility-Administered Medications:   •  acetaminophen (TYLENOL) tablet 650 mg, 650 mg, Oral, Q6H PRN, Janene Burris, DPM, 650 mg at 08/20/23 1141  •  acetaminophen (TYLENOL) tablet 975 mg, 975 mg, Oral, Q8H, Won Mishko, DPM, 975 mg at 09/05/23 1326  •  atorvastatin (LIPITOR) tablet 80 mg, 80 mg, Oral, Daily, Won Parmar, DPM, 80 mg at 09/05/23 1820  •  calcium carbonate (TUMS) chewable tablet 1,000 mg, 1,000 mg, Oral, BID PRN, GRANT Rich, 1,000 mg at 09/04/23 2235  •  famotidine (PEPCID) tablet 10 mg, 10 mg, Oral, Daily, Won Glasgowhkarly, DPM, 10 mg at 09/05/23 1043  •  gabapentin (NEURONTIN) oral solution 200 mg, 200 mg, Oral, BID, Won Parmar, DPM, 200 mg at 09/05/23 1830  •  haloperidol lactate (HALDOL) injection 2 mg, 2 mg, Intramuscular, Q6H PRN, Janene Burris, DPM, 2 mg at 08/23/23 1441  •  heparin (porcine) subcutaneous injection 5,000 Units, 5,000 Units, Subcutaneous, Q8H [facility], Seda Cummins DPM, 5,000 Units at 09/06/23 0502  •  HYDROmorphone (DILAUDID) injection 1 mg, 1 mg, Intravenous, Q4H PRN, Seda Cummins DPM, 1 mg at 08/25/23 2337  •  insulin glargine (LANTUS) subcutaneous injection 15 Units 0.15 mL, 15 Units, Subcutaneous, HS, Seda Cummins, DPM, 15 Units at 09/04/23 2115  •  insulin lispro (HumaLOG) 100 units/mL subcutaneous injection 1-5 Units, 1-5 Units, Subcutaneous, HS, Seda Cummins, DPM, 1 Units at 09/01/23 2241  •  insulin lispro (HumaLOG) 100 units/mL subcutaneous injection 2-12 Units, 2-12 Units, Subcutaneous, TID AC, 2 Units at 09/05/23 1831 **AND** Fingerstick Glucose (POCT), , , TID AC, Won Parmar DPM  •  insulin lispro (HumaLOG) 100 units/mL subcutaneous injection 5 Units, 5 Units, Subcutaneous, TID With Meals, Seda Cummins DPTRICE, 5 Units at 09/05/23 1832  •  lidocaine (LIDODERM) 5 % patch 1 patch, 1 patch, Topical, Daily, Seda Cummins DPTRICE, 1 patch at 08/25/23 9903  •  LORazepam (ATIVAN) injection 0.5 mg, 0.5 mg, Intravenous, Q6H PRN, Seda Cummins DPM, 0.5 mg at 08/31/23 0208  •  methocarbamol (ROBAXIN) tablet 500 mg, 500 mg, Oral, Q12H PRN, Seda Cummins DPM, 500 mg at 08/25/23 1659  •  NIFEdipine (PROCARDIA XL) 24 hr tablet 30 mg, 30 mg, Oral, BID, Won Parmar DPTRICE, 30 mg at 09/05/23 1820  •  ondansetron (ZOFRAN) injection 4 mg, 4 mg, Intravenous, Q6H PRN, Seda Cummins DPM, 4 mg at 09/03/23 0441  •  oxyCODONE (ROXICODONE) IR tablet 5 mg, 5 mg, Oral, Q8H PRN, Berton Adjutant, DPM, 5 mg at 08/25/23 2221  •  polyethylene glycol (MIRALAX) packet 17 g, 17 g, Oral, Daily, Pepper Hernández PA-C, 17 g at 09/05/23 1045  •  senna-docusate sodium (SENOKOT S) 8.6-50 mg per tablet 2 tablet, 2 tablet, Oral, BID, Berton Adjutant, DPM, 2 tablet at 09/05/23 1820  •  sevelamer (RENAGEL) tablet 800 mg, 800 mg, Oral, TID With Meals, Berton Adjutant, DPM, 800 mg at 09/05/23 8378    Laboratory Results:  Results from last 7 days   Lab Units 09/06/23  0458 09/05/23  0458 09/04/23  0558 09/03/23  0456 09/02/23  0555 09/01/23  0522 08/31/23  0450   WBC Thousand/uL 9.02 12.43* 9.99 11.97* 11.48* 14.32* 13.20*   HEMOGLOBIN g/dL 8.8* 8.9* 9.1* 10.2* 9.3* 9.3* 9.7*   HEMATOCRIT % 28.4* 28.4* 28.7* 32.3* 29.7* 30.1* 31.1*   PLATELETS Thousands/uL 292 324 322 368 313 297 302   POTASSIUM mmol/L 4.9 5.7* 5.2 4.5 4.3 5.0 4.5   CHLORIDE mmol/L 98 93* 94* 93* 97 98 101   CO2 mmol/L 29 31 30 32 28 25 25   BUN mg/dL 34* 61* 44* 29* 41* 31* 47*   CREATININE mg/dL 5.13* 7.93* 6.61* 5.23* 7.03* 5.85* 7.76*   CALCIUM mg/dL 8.4 8.5 8.5 8.7 8.0* 8.2* 8.3*   MAGNESIUM mg/dL 2.1  --   --   --   --   --  2.2   PHOSPHORUS mg/dL 4.1  --   --   --   --   --   --

## 2023-09-06 NOTE — [place] UNIVERSAL TRANSFER FORM
[place] UNIV[place]AL TRANSFER FORM  (ALL ITEMS MUST BE COMPLETED)    1. TRANSFER FROM: [address]    2. DATE OF TRANSFER: 9/6/2023                        TIME OF TRANSFER:1800    3. PATIENT NAME: TOPHER Wagner      YOB: 1960                             GENDER: male    4. LANGUAGE:   English    5. PHYSICIAN NAME:  Lauro Mckinney DO                   PHONE: [phone]    6. CODE STATUS: Level 1 - Full Code        Out of Hospital DNR Attached: Full    7. :                                      :  Extended Emergency Contact Information  Primary Emergency Contact: [address] [place] [address]   Mobile Phone: [phone]  Relation: Friend  Secondary Emergency Contact: [facility] Phone: [phone]  Relation: Daughter           Health Care Representative/Proxy:  YES           Legal Guardian:  Yes             NAME OF:           HEALTH CARE REPRESENTATIVE/PROXY:                                         OR           LEGAL GUARDIAN, IF NOT :                                               PHONE:  (Day)           (Night)                        (Cell)    8. REASON FOR TRANSFER: Short term rehab. V/S: /51   Pulse 71   Temp 97.7 °F (36.5 °C)   Resp 16   Ht 6' 1" (1.854 m)   Wt 115 kg (253 lb)   SpO2 95%   BMI 33.38 kg/m²           PAIN:  Lower extremities    9. PRIMARY DIAGNOSIS: Polymicrobial bacterial infection      Secondary Diagnosis:         Pacemaker: No   Internal Defib: No          Mental Health Diagnosis (if Applicable):  No    10. RESTRAINTS: No    11. RESPIRATORY NEEDS: No    12. ISOLATION/PRECAUTION: Contact Isolation MDRO    13. ALLERGY: Patient has no known allergies. 14. SENSORY: No           15. SKIN CONDITION: Wound Vac to Left foot    16. DIET: Diabetic diet    17. IV ACCESS: right chest wall permacath for dialysis    18.  PERSONAL ITEMS SENT WITH PATIENT:      19. ATTACHED DOCUMENTS: AVS    20. AT RISK ALERTS: Fall risk        HARM TO: No    21. WEIGHT BEARING STATUS:         Left Leg: Limited        Right Leg: Full    22. MENTAL STATUS:Alert and oriented x's 4    23. FUNCTION:        Walk: With help        Transfer: With help        Toilet: With help        Feed: Self    24. IMMUNIZATIONS/SCREENING:     Immunization History   Administered Date(s) Administered   • COVID-19 J&J (Jessica) vaccine 0.5 mL 05/07/2021   • Pneumococcal Polysaccharide PPV23 07/14/2022       25. BOWEL: Continent    26. BLADDER:  Continent    27.  SENDING FACILITY CONTACT: Tonya Hirsch RN                  Title: RN        Unit: [address] [facility]        Phone: [phone] [address] (if known):        Title:        Unit:         Phone:         FORM PREFILLED BY (if applicable)       Title:       Unit:        Phone:         FORM COMPLETED BY Rola Salcedo RN      Title: RN      Phone: [phone]

## 2023-09-06 NOTE — ASSESSMENT & PLAN NOTE
Polymicrobial bacteremia.  Enterobacter/Proteus/Streptococcus/Granulicatella  · Suspect cause of patient's initial metabolic encephalopathy which has improved with abx treatment  · ID following  · Repeat blood cultures negative  · D/c cefepime and flagyl following surgical cure  · Completed IV vancomycin  · Afebrile  · Monitor fever curve and cbc

## 2023-09-06 NOTE — DISCHARGE SUMMARY
[address] [facility]  Discharge- [place] 1960, 58 y.o. male MRN: [id number]  Unit/Bed#: [address] [place] [address] Encounter: [id number]  Primary Care Provider: Karolyn Moss MD   Date and time admitted to hospital: 8/19/2023 10:27 AM    * Polymicrobial bacterial infection  Assessment & Plan  Polymicrobial bacteremia.  Enterobacter/Proteus/Streptococcus/Granulicatella  · Suspect cause of patient's initial metabolic encephalopathy which has improved with abx treatment  · ID following  · Repeat blood cultures negative  · D/c cefepime and flagyl following surgical cure  · Completed IV vancomycin  · Afebrile  · Monitor fever curve and cbc    Diabetic ulcer of left midfoot associated with type 2 diabetes mellitus, limited to breakdown of skin [facility])  Assessment & Plan  Lab Results   Component Value Date    HGBA1C 6.6 (H) 07/07/2022       Recent Labs     09/05/23  1616 09/05/23  2201 09/06/23  0757 09/06/23  1139   POCGLU 169* 92 103 156*       Blood Sugar Average: Last 72 hrs:  (P) 489.0005826048611069     Podiatry consulted for Delgadillo grade 1 ulcer submetatarsal 1 left foot with cellulitis and concern for septic joint and osteomyelitis on MRI  · S/P Debridement 8/21 and 8/24  · Noted to have elevated CRP, procalcitonin  · Wound culture 8/21 growing 1 + pseudomonas aeruginosa, 2 + enterobacter cloacae  · Wound culture 8/24 growing proteus, enterobacter, enterococcus  · MRI R foot: First submetatarsal head plantar skin ulceration with suspected draining sinus tract extending to the first metatarsal phalangeal joint suspicious for septic arthropathy  · Vascular consulted as imaging showing lower limb diffuse atherosclerotic disease noted throughout with elevated PSV of proximal popliteal artery and tibial peroneal trunk suggesting less than 50% stenosis, metatarsal pressure greater than 200, great toe pressure 95 mmHg within the healing range on right side.   Left lower limb showed diffuse atherosclerotic disease noted throughout with a 50 to 75% stenosis of the proximal popliteal artery, metatarsal pressure 170, great toe pressure 98 mmHg within healing range. · angiogram by IR  8/29/23  · s/p right first ray partial resection and application of wound VAC on 8/31  · S/p wound vac change 9/2 and 9/5  · Continue wound VAC therapy on discharge with close follow up with podiatry    Bradycardia  Assessment & Plan  · EKG shows junctional oneil HR 38  · Asymptomatic  · BP stable  · Trop negative  · Potassium noted to be 5.7, did receive calcium and dialysis with improvement of rate  · Cardiology consulted  · Stable for discharge from cardiology perspective  · discontinue Coreg, continue procardia    Status post fall  Assessment & Plan  Patient sustained a fall about 2 days prior to admission   · Etiology not clear-EKG was unremarkable. Blood sugar was 157. Questionable hypoglycemia versus accidental fall. Patient not exhibiting any signs of stroke. · Telemetry showed no evidence of arrhythmia  · Patient does not remember how he fell. Patient was found on the floor and could not get up. · PT/OT evaluation and treatment, recommending home with home health currently, however patient feels that he is deconditioned and is concerned about his status postoperatively.   Continue to follow  · CAT scan of the brain, CT chest abdomen pelvis, cervical spine and lumbar reconstructions study was ordered in the ED but patient refused the studies  · Patient got CT of the head and neck after receiving Ativan which was unremarkable  · Fall precautions    Hypertension  Assessment & Plan  · Hold Coreg given bradycardia  · Continue nifedipine    Type 2 diabetes mellitus, without long-term current use of insulin [facility])  Assessment & Plan  Lab Results   Component Value Date    HGBA1C 6.6 (H) 07/07/2022       Recent Labs     09/05/23  1616 09/05/23  2201 09/06/23  0757 09/06/23  1139   POCGLU 169* 92 103 156*       Blood Sugar Average: Last 72 hrs:  (P) [id number]   · Patient is on linagliptin at home  · Continue Humalog sliding scale with Accu-Cheks before every meal and at bedtime  · diabetic diet  · lantus 15 hs and lispro 5 units tid with meals    ESRD (end stage renal disease) [facility])  Assessment & Plan  Lab Results   Component Value Date    EGFR 10 09/06/2023    EGFR 6 09/05/2023    EGFR 8 09/04/2023    CREATININE 5.13 (H) 09/06/2023    CREATININE 7.93 (H) 09/05/2023    CREATININE 6.61 (H) 09/04/2023   · Patient is on Tuesday Thursday Saturday schedule at [address]  · IJ permcath removed, temporary  catheter placed 8/25  · Nephrology following  · Patient cleared by ID for permacath placement which he underwent on 8/30    Medical Problems     Resolved Problems  Date Reviewed: 9/6/2023          Resolved    Low back pain 8/24/2023     Resolved by  Fahad Lopez DO    Altered mental status 8/28/2023     Resolved by  Mark Ventura PA-C        Discharging Physician / Practitioner: Mark Ventura PA-C  PCP: Sangeetha Lockhart MD  Admission Date:   Admission Orders (From admission, onward)     Ordered        08/19/23 [facility]  Once                      Discharge Date: 09/06/23    Consultations During Hospital Stay:  · Nephrology  · Psychiatry  · Neurology  · Podiatry  · Infectious disease  · Interventional radiology  · Vascular surgery    Procedures Performed:   · 8/23/23: IR tunneled central line removal  · 8/25/23: IR temporary dialysis catheter placement  · 8/29/23: IR lower extremity angiogram  · Normal abdominal aortogram and pelvic angiogram except for a small focal atherosclerotic area of the right common iliac artery. The mid popliteal artery demonstrates a less than 50% stenosis. The distal popliteal artery demonstrates a 30% stenosis. The origin of the anterior tibial artery demonstrates a focal moderate stenosis. None of these areas appear flow-limiting.  Otherwise widely patent three-vessel runoff with widely patent anterior tibial and posterior tibial arteries in the foot. The anterior and posterior tibial arteries in the foot demonstrate distal vessel disease. · 8/30/23: IR tunneled dialysis catheter placement  · Conversion of right-sided internal jugular non-tunneled central venous catheter for a tunneled dialysis catheter, with tip in the expected location of the right atrium. · 8/31/23: Right partial 1st ray resection with wound vac application by podiatry    Significant Findings / Test Results:   · CTA head/neck: No acute intracranial abnormality. Tiny hypodensity right posterior inferior putamen, likely chronic lacunar infarct or dilated perivascular space. Negative CTA head and neck for large vessel occlusion, dissection, aneurysm, or high-grade stenosis. Possible tracheobronchomalacia. · XR L foot: 1. Possible soft tissue swelling in the soft tissues adjacent to the first MTP joint. Correlate with physical signs for infection or inflammation. 2. No radiographic evidence of osteomyelitis however radiographic changes are typically delayed. If this is of continued clinical concern further assessment with MRI and/or three-phase bone scan may be obtained for further assessment. 3. Extensive arthritic changes in the foot as discussed above. · CXR 8/21/23: Increased reticular opacity in the medial aspect of the right lung base compared to 1/21/2022, which can represent infectious pneumonitis, aspiration pneumonitis or subsegmental atelectasis. Clinical correlation is recommended. · MRI brain:  Limited, motion degraded examination. There is no evidence for an acute territorial infarct. · MRI R foot:  First submetatarsal head plantar skin ulceration with suspected draining sinus tract extending to the first metatarsal phalangeal joint. Findings raise suspicion for septic arthropathy including marrow changes involving both the tibial and fibular sesamoid bones consistent with osteomyelitis.  Minimal marrow changes across the first metatarsal phalangeal joint at this time. · VAS lower limb arterial duplex: RIGHT LOWER LIMB: Diffuse atherosclerotic disease noted throughout with elevated PSV of the proximal popliteal artery and tibioperoneal trunk suggesting a <50% stenosis. Ankle/Brachial index: unreliable due to non-compressible vessels. No Prior. PVR/ PPG tracings are normal. Metatarsal pressure of >200 mmHg. Great toe pressure of 95 mmHg, within the healing range. LEFT LOWER LIMB: Diffuse atherosclerotic disease noted throughout with a 50-75 % stenosis of the proximal popliteal artery. Ankle/Brachial index: unreliable due to non-compressible vessels. No Prior. PVR/ PPG tracings are normal. Metatarsal pressure of 170 mmHg. Great toe pressure of 98 mmHg, within the healing range. · XR R foot: No definite radiographic evidence of osteomyelitis. Previously reported marrow changes on MRI are radiographically occult. · VAS lower limb arterial duplex: RIGHT LOWER LIMB: Diffuse disease noted throughout the femoral-popliteal arteries without significant focal stenosis. Evidence suggestive of TIb/Peroneal occlusive disease with a <50% stenosis at the proximal tibioperoneal trunk. Ankle/Brachial index: 1.66 , which unreliable due to non-compressible vessels. ( Prior: unreliable due to non-compressible vessels.) Metatarsal pressure of 205mmHg (Prior +200mmHg) Great toe pressure of 73 mmHg, within the normal healing range. (Prior 95 mmHg) PVR/ PPG tracings are normal. LEFT LOWER LIMB: Ankle/Brachial index: 1.66, which is unreliable due to non-compressible vessels. ( Prior: unreliable due to non-compressible vessels.) Metatarsal pressure of  +240mmHg. Great toe pressure of 72 mmHg, within the normal healing range. PVR/ PPG tracings are normal.  · XR R foot: Unremarkable postoperative appearance.     Incidental Findings:   · none    Test Results Pending at Discharge (will require follow up):   · none     Outpatient Tests Requested:  · none    Complications:  none    Reason for Admission: pain s/p fall    Hospital Course:   Enrique Harvey is a 58 y.o. male patient who originally presented to the hospital on 8/19/2023 due to having a fall at home and lying on the ground for 2 days. He was initially confused and agitated early during admission for which neurology was consulted. Imaging of the brain was negative. Patient underwent trauma scans with sedation which were negative. Septic workup was initiated. Patient was noted to have polymicrobial bacteremia for which ID was consulted. Infection likely cause of patient's altered mental status which has returned to baseline. Podiatry consulted for right foot wound which imaging showed was consistent with osteomyelitis. He underwent angiogram with IR prior to right ray resection by podiatry with wound VAC application. Nephrology was consulted as patient is on dialysis. His permcath was exchanged, thought likely source of bacteremia was foot wound. Repeat blood cultures have remained negative. Patient was noted to be bradycardic and cardiology was consulted. EKG and telemetry showed junctional rhythm. He was given calcium gluconate for mild hyperkalemia and heart rate improved after dialysis. Patient is hemodynamically stable and cleared by all consultants for discharge. Patient will be discharged to rehab with wound vac and follow up with podiatry outpatient. Please see above list of diagnoses and related plan for additional information. Condition at Discharge: stable    Discharge Day Visit / Exam:   Subjective:  Patient reports he continues to feel well. Denies any specific complaints including chest pain, shortness of breath, nausea, vomiting, abdominal pain. He had a bowel movement this morning. No foot pain.   Vitals: Blood Pressure: 122/55 (09/06/23 0959)  Pulse: 63 (09/06/23 0959)  Temperature: 97.7 °F (36.5 °C) (09/06/23 0959)  Temp Source: Oral (09/05/23 1230)  Respirations: 16 (09/06/23 0959)  Height: 6' 1" (185.4 cm) (08/24/23 0710)  Weight - Scale: 115 kg (253 lb) (08/24/23 0710)  SpO2: 96 % (09/06/23 0959)  Exam:   Physical Exam  Vitals and nursing note reviewed. Constitutional:       General: He is not in acute distress. Appearance: He is well-developed. Cardiovascular:      Rate and Rhythm: Normal rate and regular rhythm. Pulmonary:      Effort: Pulmonary effort is normal. No respiratory distress. Breath sounds: Normal breath sounds. Abdominal:      Palpations: Abdomen is soft. Tenderness: There is no abdominal tenderness. Musculoskeletal:         General: No swelling. Cervical back: Neck supple. Skin:     General: Skin is warm and dry. Capillary Refill: Capillary refill takes less than 2 seconds. Comments: Right foot dressing in place with wound VAC   Neurological:      Mental Status: He is alert. Psychiatric:         Mood and Affect: Mood normal.         Discussion with Family: Patient declined call to . Discharge instructions/Information to patient and family:   See after visit summary for information provided to patient and family. Provisions for Follow-Up Care:  See after visit summary for information related to follow-up care and any pertinent home health orders. Disposition:   Other 2[address] \[facility] [place] [facility]\"" at [facility] at [facility] Readmission: none     Discharge Statement:  I spent >30 minutes discharging the patient. This time was spent on the day of discharge. I had direct contact with the patient on the day of discharge. Greater than 50% of the total time was spent examining patient, answering all patient questions, arranging and discussing plan of care with patient as well as directly providing post-discharge instructions. Additional time then spent on discharge activities.     Discharge Medications:  See after visit summary for reconciled discharge medications provided to patient and/or family.       **Please Note: This note may have been constructed using a voice recognition system**

## 2023-09-06 NOTE — ASSESSMENT & PLAN NOTE
Lab Results   Component Value Date    HGBA1C 6.6 (H) 07/07/2022       Recent Labs     09/05/23  1616 09/05/23  2201 09/06/23  0757 09/06/23  1139   POCGLU 169* 92 103 156*       Blood Sugar Average: Last 72 hrs:  (P) [id number]     Podiatry consulted for [place] grade 1 ulcer submetatarsal 1 left foot with cellulitis and concern for septic joint and osteomyelitis on MRI  · S/P Debridement 8/21 and 8/24  · Noted to have elevated CRP, procalcitonin  · Wound culture 8/21 growing 1 + pseudomonas aeruginosa, 2 + enterobacter cloacae  · Wound culture 8/24 growing proteus, enterobacter, enterococcus  · MRI R foot: First submetatarsal head plantar skin ulceration with suspected draining sinus tract extending to the first metatarsal phalangeal joint suspicious for septic arthropathy  · Vascular consulted as imaging showing lower limb diffuse atherosclerotic disease noted throughout with elevated PSV of proximal popliteal artery and tibial peroneal trunk suggesting less than 50% stenosis, metatarsal pressure greater than 200, great toe pressure 95 mmHg within the healing range on right side. Left lower limb showed diffuse atherosclerotic disease noted throughout with a 50 to 75% stenosis of the proximal popliteal artery, metatarsal pressure 170, great toe pressure 98 mmHg within healing range.   · angiogram by IR  8/29/23  · s/p right first ray partial resection and application of wound VAC on 8/31  · S/p wound vac change 9/2 and 9/5  · Continue wound VAC therapy on discharge with close follow up with podiatry

## 2023-09-06 NOTE — ASSESSMENT & PLAN NOTE
Lab Results   Component Value Date    EGFR 10 09/06/2023    EGFR 6 09/05/2023    EGFR 8 09/04/2023    CREATININE 5.13 (H) 09/06/2023    CREATININE 7.93 (H) 09/05/2023    CREATININE 6.61 (H) 09/04/2023   · Patient is on Tuesday Thursday Saturday schedule at [facility]  · IJ permcath removed, temporary  catheter placed 8/25  · Nephrology following  · Patient cleared by ID for permacath placement which he underwent on 8/30

## 2023-09-06 NOTE — OCCUPATIONAL THERAPY NOTE
OT TREATMENT       09/06/23 1434   Note Type   Note Type Cancelled Session   Cancel Reasons Refusal   Licensure   [place] License Number  Fátima Byrne [id number] [facility] OTR/L [id number]

## 2023-09-06 NOTE — CASE MANAGEMENT
Case Management Discharge Planning Note    Patient name Yamilex Chang  Location [address] [facility] [address] [facility] 56-* MRN [id number]  : 1960 Date 2023       Current Admission Date: 2023  Current Admission Diagnosis:Polymicrobial bacterial infection   Patient Active Problem List    Diagnosis Date Noted   • Bradycardia 2023   • Cellulitis of right foot    • Polymicrobial bacterial infection 2023   • 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 mellitus, with necrosis of bone ([address])    • Acquired deformity of foot, right    • Charcot's joint of right foot    • Subacute osteomyelitis of right foot ([address])    • Open wound of right foot 2023   • 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 mellitus, with bone involvement without evidence of necrosis ([address])    • Diabetic ulcer of left midfoot associated with type 2 diabetes mellitus, limited to breakdown of skin ([address])    • Diabetic polyneuropathy associated with type 2 diabetes mellitus ([address])    • Diabetes mellitus type 2 with peripheral artery disease ([address])    • History of amputation of lesser toe of left foot ([address])    • Onychomycosis    • Status post fall 2023   • Traumatic rhabdomyolysis ([address]) 2023   • Leukocytosis 2023   • Electrolyte abnormality 2023   • Osteoarthritis of left hip 2022   • Severe Left Orchalgia 2022   • Right shoulder pain 2022   • Left hip pain 2022   • Hemodialysis status ([address])    • Hypervolemia    • Anemia 2022   • ESRD (end stage renal disease) ([address]) 2022   • Type 2 diabetes mellitus, without long-term current use of insulin (HCC)    • Hypertension    • History of TIA (transient ischemic attack)    • T10 vertebral fracture (HCC)       LOS (days): 18  Geometric Mean LOS (GMLOS) (days): 4.70  Days to GMLOS:-13.3     OBJECTIVE:  Risk of Unplanned Readmission Score: 32.61      Current admission status: Inpatient   Preferred Pharmacy:   [id number] - [address] [address]  Phone: [phone] Fax: [phone]    Primary Care Provider: Javier Sierra MD    Primary Insurance: MEDICARE  Secondary Insurance:     DISCHARGE DETAILS:    Discharge planning discussed with[de-identified] Patient  Freedom of Choice: Yes        Were Treatment Team discharge recommendations reviewed with patient/caregiver?: Yes  Did patient/caregiver verbalize understanding of patient care needs?: N/A- going to facility  Were patient/caregiver advised of the risks associated with not following Treatment Team discharge recommendations?: Yes    Other Referral/Resources/Interventions Provided:  Interventions: Short Term Rehab, Transportation  Referral Comments: SW confirmed with [facility] that they can accept patient for admission today. Tommy Spain confirmed they have wound vac in-house already. SW uploaded vac orders to [facility]. Tommy Spain also confirmed they will schedule transport for HD tomorrow at [facility]. Call made to [facility]. Rep Judith Keys notified of plan to resume care in clinic tomorrow. Treatment Team Recommendation: Short Term Rehab  Discharge Destination Plan[de-identified] Short Term Rehab  Transport at Discharge : Wheelchair [facility]     Number/Name of Dispatcher: [address] and Unit #): TBD  ETA of Transport (Date): 09/06/23  ETA of Transport (Time):  (TBD)     IMM Given (Date):: 09/06/23  IMM Given to[de-identified] Patient  IMM reviewed with patient, patient agrees with discharge determination. Original provided. Copy placed in scan bin. Accepting Facility Name, [address] [place] [address] : [facility]  Receiving Facility/Agency Phone Number: [phone]  Facility/Agency Fax Number: [phone]       Update: [facility] to transport at [phone] 62. JANIA ANGEL, unit nurse, and facility updated.

## 2023-09-07 VITALS
OXYGEN SATURATION: 95 % | HEART RATE: 71 BPM | WEIGHT: 253 LBS | SYSTOLIC BLOOD PRESSURE: 125 MMHG | RESPIRATION RATE: 16 BRPM | TEMPERATURE: 97.7 F | HEIGHT: 73 IN | DIASTOLIC BLOOD PRESSURE: 51 MMHG | BODY MASS INDEX: 33.53 KG/M2

## 2023-09-08 PROCEDURE — 88311 DECALCIFY TISSUE: CPT | Performed by: PATHOLOGY

## 2023-09-08 PROCEDURE — 88305 TISSUE EXAM BY PATHOLOGIST: CPT | Performed by: PATHOLOGY

## 2023-09-11 NOTE — CONSULTS
Consultation - Cardiology   [facility]      Андрей Albrecht 58 y.o. male MRN: [id number]  : 1960  Unit/Bed#: [address] [place] [address] Encounter: [id number]        Assessment & Plan   1. Junctional/sinus bradycardia: Mildly symptomatic with complaints of generalized weakness    -   Hemodynamically stable    -   Heart rate improving after receiving calcium gluconate and starting hemodialysis    -   We will continue to monitor telemetry at this time    -   Not on AV marek blocking medication     2. Diabetes: Hemoglobin A1c 6.6    -   Managed per primary team     3. End-stage renal disease on hemodialysis: Managed per nephrology    -   Receiving hemodialysis treatment today     4. Polymicrobial bacteremia: On IV vancomycin    -   Infectious disease following     5. Diabetic foot ulcer: Managed per podiatry and wound team     6. Hypertension: Blood pressure is currently stable    -   Continue Procardia XL 30 mg twice daily     Summary of Recommendations:         Thank you for your consultation.     Physician Requesting Consult: Lauro Mckinney DO     Reason for Consult / Principal Problem: Junctional rhythm     Inpatient consult to Cardiology  Consult performed by: GRANT Wallace  Consult ordered by: GRANT Shine Asp           HPI: Андрей Albrecht is a 58y.o. year old male who was admitted to the hospital on 2023, after he was found on the floor of his home and he was unsure how he got there. He believes he was laying on the floor for 2 days yelling for help. He was found by 911 after daughter called for well person check. During hospitalization, he was found to have encephalopathy, polymicrobial bacteremia and a right diabetic foot ulcer with cellulitis for which she has had debridement. Has history of end-stage renal disease and undergoes dialysis.     Overnight patient became bradycardic and was placed on telemetry monitoring.   Initial twelve-lead EKG appears to be junctional, at times on telemetry appears patient has sinus bradycardia. His potassium today was 5.7. He was given IV calcium gluconate 1 g per nephrology and started on hemodialysis this morning with improvement in his heart rate.     Patient only complaint was of feeling fatigued. He denied any dizziness. But activity is fairly limited due to presence of wound VAC on foot.     Cardiac testing 5/14/2023 at [facility] patient had TTE: Which noted LV cavity was grossly normal in size but could not exclude regional wall motion abnormalities due to suboptimal views, left atrial size was normal, there is mild mitral valve regurgitation and right side structures were not well visualized. Lexiscan nuclear stress test performed at [facility] in 2015 was read as a nonischemic study.     8/24/2023 TTE performed here at [facility] noted EF 55% with abnormal diastolic function, left atrium was moderately dilated and there was no clear evidence of valvular vegetation or abscess. Definity was given for LV wall enhancement.     Review of Systems   Constitutional: Positive for fatigue. Negative for activity change. HENT: Negative. Negative for congestion, ear discharge, postnasal drip and tinnitus. Eyes: Negative. Negative for photophobia and visual disturbance. Respiratory: Negative. Negative for chest tightness and shortness of breath. Cardiovascular: Negative. Negative for chest pain, palpitations and leg swelling. Gastrointestinal: Negative. Negative for abdominal distention, blood in stool, constipation, diarrhea, nausea and vomiting. Endocrine: Negative. Negative for polydipsia, polyphagia and polyuria. Genitourinary: Negative. Negative for difficulty urinating. Musculoskeletal: Positive for gait problem. Skin: Negative. Neurological: Positive for weakness. Negative for dizziness, syncope and light-headedness. Hematological: Negative. Psychiatric/Behavioral: Negative.       Historical Information        Past Medical History:   Diagnosis Date   • CKD     • Diabetes mellitus ([address])     • Hyperlipidemia     • Hypertension     • Left toe amputee ([address])     • TIA (transient ischemic attack)              Past Surgical History:   Procedure Laterality Date   • AMPUTATION Left       left foot resection 10 years ago dr Venice Bill   • [address]   08/29/2023   • IR TEMPORARY DIALYSIS CATHETER PLACEMENT   08/25/2023   • IR TUNNELED CENTRAL LINE REMOVAL   08/23/2023   • IR TUNNELED DIALYSIS CATHETER PLACEMENT   01/27/2022   • IR TUNNELED DIALYSIS CATHETER PLACEMENT   08/30/2023   • MS AMPUTATION METATARSAL W/TOE SINGLE Right 8/31/2023     Procedure: RIGHT PARTIAL 1ST RAY RESECTION WITH  WOUND VAC APPLICATION;  Surgeon: Janene Burris DPM;  Location: [facility];  Service: Podiatry      Social History           Substance and Sexual Activity   Alcohol Use Not Currently   • Alcohol/week: 0.0 standard drinks of alcohol     Comment: 0      Social History          Substance and Sexual Activity   Drug Use Never      Social History           Tobacco Use   Smoking Status Never   • Passive exposure: Never   Smokeless Tobacco Never      Family History: History reviewed.  No pertinent family history.     Meds/Allergies    PTA meds:        Medications Prior to Admission   Medication   • acetaminophen (TYLENOL) 325 mg tablet   • atorvastatin (LIPITOR) 80 mg tablet   • carvedilol (COREG) 6.25 mg tablet   • clopidogrel (PLAVIX) 75 mg tablet   • docusate sodium (COLACE) 100 mg capsule   • famotidine (PEPCID) 10 mg tablet   • lidocaine (LIDODERM) 5 %   • linaGLIPtin 5 MG TABS   • methocarbamol (ROBAXIN) 500 mg tablet   • NIFEdipine ER (ADALAT CC) 30 MG 24 hr tablet   • oxyCODONE (ROXICODONE) 5 immediate release tablet   • predniSONE 10 mg tablet   • sevelamer (RENAGEL) 800 mg tablet   • allopurinol (ZYLOPRIM) 100 mg tablet   • naloxone (NARCAN) 4 mg/0.1 mL nasal spray      No Known Allergies     Current Facility-Administered Medications:   •  acetaminophen (TYLENOL) tablet 650 mg, 650 mg, Oral, Q6H PRN, Tip Glass, DPM, 650 mg at 08/20/23 1141  •  acetaminophen (TYLENOL) tablet 975 mg, 975 mg, Oral, Q8H, Won Mishko, DPM, 975 mg at 09/03/23 0500  •  atorvastatin (LIPITOR) tablet 80 mg, 80 mg, Oral, Daily, Won Mishko, DPM, 80 mg at 09/04/23 1717  •  calcium carbonate (TUMS) chewable tablet 1,000 mg, 1,000 mg, Oral, BID PRN, Madison Rabago, CRNP, 1,000 mg at 09/04/23 2235  •  famotidine (PEPCID) tablet 10 mg, 10 mg, Oral, Daily, Won Mishko, DPM, 10 mg at 09/05/23 1043  •  gabapentin (NEURONTIN) oral solution 200 mg, 200 mg, Oral, BID, Won Mishko, DPM, 200 mg at 09/04/23 1717  •  haloperidol lactate (HALDOL) injection 2 mg, 2 mg, Intramuscular, Q6H PRN, Tip Glass, DPM, 2 mg at 08/23/23 1441  •  heparin (porcine) subcutaneous injection 5,000 Units, 5,000 Units, Subcutaneous, Q8H [address], Tip Glass, DPM, 5,000 Units at 09/05/23 0502  •  HYDROmorphone (DILAUDID) injection 1 mg, 1 mg, Intravenous, Q4H PRN, Tip Glass, DPM, 1 mg at 08/25/23 2337  •  insulin glargine (LANTUS) subcutaneous injection 15 Units 0.15 mL, 15 Units, Subcutaneous, HS, Won Mishko, DPM, 15 Units at 09/04/23 2115  •  insulin lispro (HumaLOG) 100 units/mL subcutaneous injection 1-5 Units, 1-5 Units, Subcutaneous, HS, Won Mishko, DPM, 1 Units at 09/01/23 2241  •  insulin lispro (HumaLOG) 100 units/mL subcutaneous injection 2-12 Units, 2-12 Units, Subcutaneous, TID AC, 2 Units at 09/04/23 1220 **AND** Fingerstick Glucose (POCT), , , TID Won CARTER DPM  •  insulin lispro (HumaLOG) 100 units/mL subcutaneous injection 5 Units, 5 Units, Subcutaneous, TID With Meals, Tip Glass, DPM, 5 Units at 09/04/23 1716  •  lidocaine (LIDODERM) 5 % patch 1 patch, 1 patch, Topical, Daily, Tip Grant, DPM, 1 patch at 08/25/23 1046  •  LORazepam (ATIVAN) injection 0.5 mg, 0.5 mg, Intravenous, Q6H PRN, Loc Doctor Mishko, DPM, 0.5 mg at 08/31/23 0208  •  methocarbamol (ROBAXIN) tablet 500 mg, 500 mg, Oral, Q12H PRN, Versa Nsetor, DPM, 500 mg at 08/25/23 1659  •  NIFEdipine (PROCARDIA XL) 24 hr tablet 30 mg, 30 mg, Oral, BID, Won Mishko, DPM, 30 mg at 09/04/23 1717  •  ondansetron (ZOFRAN) injection 4 mg, 4 mg, Intravenous, Q6H PRN, Versa Nestor, DPM, 4 mg at 09/03/23 0441  •  oxyCODONE (ROXICODONE) IR tablet 5 mg, 5 mg, Oral, Q8H PRN, Versa Nestor, DPM, 5 mg at 08/25/23 2221  •  polyethylene glycol (MIRALAX) packet 17 g, 17 g, Oral, Daily, Day Montenegro PA-C, 17 g at 09/04/23 0848  •  senna-docusate sodium (SENOKOT S) 8.6-50 mg per tablet 2 tablet, 2 tablet, Oral, BID, Versa Nestor, DPM, 2 tablet at 09/04/23 1717  •  sevelamer (RENAGEL) tablet 800 mg, 800 mg, Oral, TID With Meals, Versa Nestor, DPM, 800 mg at 09/04/23 1717  •  vancomycin (VANCOCIN) IVPB (premix in dextrose) 750 mg 150 mL, 750 mg, Intravenous, Once per day on Tue Thu SatMeaghan PA-C     VTE Pharmacologic Prophylaxis:   Sequential compression device Kay Higginbotham)      Objective:   Vitals: Blood pressure 139/60, pulse 68, temperature 97.9 °F (36.6 °C), temperature source Oral, resp. rate 18, height 6' 1" (1.854 m), weight 115 kg (253 lb), SpO2 98 %. Body mass index is 33.38 kg/m².       Wt Readings from Last 3 Encounters:   08/24/23 115 kg (253 lb)   07/31/22 102 kg (225 lb 6.4 oz)   07/06/22 106 kg (233 lb 11 oz)          BP Readings from Last 3 Encounters:   09/05/23 139/60   07/31/22 118/60   07/14/22 139/52          Orthostatic Blood Pressures    Flowsheet Row Most Recent Value   Blood Pressure 139/60 filed at 09/05/2023 1030   Patient Position - Orthostatic VS Lying filed at 09/05/2023 0804             Intake/Output Summary (Last 24 hours) at 9/5/2023 1043  Last data filed at 9/5/2023 0900      Gross per 24 hour   Intake 440 ml   Output 600 ml   Net -160 ml             Invasive Devices            Peripheral Intravenous Line  Duration       Peripheral IV 09/02/23 Distal;Left;Upper;Ventral (anterior) Arm 2 days                  Hemodialysis Catheter  Duration             HD Permanent Double Catheter 6 days                       Physical Exam:   Physical Exam  Vitals and nursing note reviewed. Constitutional:       General: He is not in acute distress. Appearance: Normal appearance. He is obese. HENT:      Right Ear: External ear normal.      Left Ear: External ear normal.      Nose: Nose normal.   Eyes:      General: No scleral icterus. Right eye: No discharge. Left eye: No discharge. Cardiovascular:      Rate and Rhythm: Regular rhythm. Bradycardia present. Pulses: Normal pulses. Heart sounds: Murmur heard. Pulmonary:      Effort: Pulmonary effort is normal. No respiratory distress. Breath sounds: Normal breath sounds. Abdominal:      General: Bowel sounds are normal. There is no distension. Palpations: Abdomen is soft. Musculoskeletal:      Right lower leg: No edema. Left lower leg: No edema. Comments: Wound VAC to right foot   Skin:     General: Skin is warm and dry. Capillary Refill: Capillary refill takes less than 2 seconds. Neurological:      General: No focal deficit present. Mental Status: He is alert and oriented to person, place, and time. Mental status is at baseline.    Psychiatric:         Mood and Affect: Mood normal.            Labs:   Troponins:  Results from last 7 days   Lab Units 09/05/23  0641   HS TNI RAND ng/L 9         CBC with diff:              Results from last 7 days   Lab Units 09/05/23  0458 09/04/23  0558 09/03/23  0456 09/02/23  0555 09/01/23  0522 08/31/23  0450 08/30/23  0553   WBC Thousand/uL 12.43* 9.99 11.97* 11.48* 14.32* 13.20* 14.70*   HEMOGLOBIN g/dL 8.9* 9.1* 10.2* 9.3* 9.3* 9.7* 9.3*   HEMATOCRIT % 28.4* 28.7* 32.3* 29.7* 30.1* 31.1* 30.0*   MCV fL 94 94 92 93 92 93 93   PLATELETS Thousands/uL 324 322 368 313 297 302 302   RBC Million/uL 3.02* 3.07* 3.53* 3.19* 3.26* 3.33* 3.23*   MCH pg 29.5 29.6 28.9 29.2 28.5 29.1 28.8   MCHC g/dL 31.3* 31.7 31.6 31.3* 30.9* 31.2* 31.0*   RDW % 17.8* 17.5* 17.5* 17.4* 17.2* 17.5* 17.2*   MPV fL 10.2 9.6 9.5 9.6 9.4 9.7 9.5         CMP:              Results from last 7 days   Lab Units 09/05/23  0458 09/04/23  0558 09/03/23  0456 09/02/23  0555 09/01/23  0522 08/31/23  0450 08/30/23  0553   SODIUM mmol/L 133* 133* 134* 134* 133* 135 134*   POTASSIUM mmol/L 5.7* 5.2 4.5 4.3 5.0 4.5 4.2   CHLORIDE mmol/L 93* 94* 93* 97 98 101 100   CO2 mmol/L 31 30 32 28 25 25 25   ANION GAP mmol/L 9 9 9 9 10 9 9   BUN mg/dL 61* 44* 29* 41* 31* 47* 38*   CREATININE mg/dL 7.93* 6.61* 5.23* 7.03* 5.85* 7.76* 6.49*   CALCIUM mg/dL 8.5 8.5 8.7 8.0* 8.2* 8.3* 8.2*   EGFR ml/min/1.73sq m 6 8 10 7 9 6 8   GLUCOSE RANDOM mg/dL 113 121 127 132 164* 106 132         Magnesium:        Results from last 7 days   Lab Units 08/31/23  0450   MAGNESIUM mg/dL 2.2      Coags:     TSH:      No components found for: "TSH3"  Lipid Profile:     Lipid Profile:   No results found for: "CHOLESTEROL", "HDL", "LDLCALC", "TRIG"  Hgb A1c:     NT-proBNP: No results for input(s): "NTBNP" in the last 72 hours.      Imaging & Testing         Imaging: I have personally reviewed pertinent reports. VAS lower limb arterial duplex, limited, unilateral     Result Date: 9/1/2023  Narrative:  [facility] REPORT CLINICAL: Indications: Patient presents with an ulcer on his right plantar which started several days ago. Patient denies any pain with walking. Operative History: 2023-08-30 Right tunneled dialysis catheter placement 2023-08-25 dialysis catheter placement Left toe amputation date unknown Risk Factors The patient has history of HTN, Diabetes (NIDDM (oral meds)) and CKD. Clinical Right Pressure:  145/ mm Hg, Left Pressure:  139/ mm Hg.   FINDINGS:  Right                  Impression       PSV (cm/s)  Common Femoral Artery  Diffuse Disease 127  Prox Profunda                                   64  Prox SFA                                       135  Mid SFA                                        135  Dist SFA                                       106  Proximal Pop                                   262  Distal Pop                                      91  Tibioperoneal          <50%                    282  Prox Post Tibial                                83  Dist Post Tibial                               103  Prox. Ant. Tibial                              127  Dist. Ant. Tibial                              137  Prox Peroneal                                  103  Dist Peroneal                                   77     CONCLUSION: Impression: RIGHT LOWER LIMB: Diffuse disease noted throughout the femoral-popliteal arteries without significant focal stenosis. Evidence suggestive of TIb/Peroneal occlusive disease with a <50% stenosis at the proximal tibioperoneal trunk. Ankle/Brachial index: 1.66 , which unreliable due to non-compressible vessels. ( Prior: unreliable due to non-compressible vessels.) Metatarsal pressure of 205mmHg (Prior +200mmHg) Great toe pressure of 73 mmHg, within the normal healing range. (Prior 95 mmHg) PVR/ PPG tracings are normal.  LEFT LOWER LIMB: Ankle/Brachial index: 1.66, which is unreliable due to non-compressible vessels. ( Prior: unreliable due to non-compressible vessels.) Metatarsal pressure of  +240mmHg Great toe pressure of 72 mmHg, within the normal healing range. PVR/ PPG tracings are normal.  Compared to previous study on 8/24/2023, there is no significant change. Recommend repeat testing in 3 months as per protocol unless otherwise indicated. SIGNATURE: Electronically Signed by: Doris Storey MD on 2023-09-01 04:19:13 PM     XR foot right 3+ views     Result Date: 9/1/2023  Narrative: RIGHT FOOT INDICATION:  Postoperative imaging.  COMPARISON: 8/26/2023 VIEWS:  XR FOOT 3+ VW RIGHT FINDINGS: Status post transmetatarsal amputation first ray. There is no acute fracture or dislocation. Lateral deviation of the second through fourth toes again noted. Extensive degenerative changes and bony fusion seen along the midfoot with hindfoot degenerative changes as well. Calcaneal spurs. No lytic or blastic osseous lesion. There are atherosclerotic calcifications. Wound VAC overlying the postoperative bed.      Impression: Unremarkable postoperative appearance. Workstation performed: [id number]      IR tunneled dialysis catheter placement     Result Date: 8/30/2023  Narrative: PROCEDURE: Conversion of non-tunneled to tunneled central venous catheter PROCEDURE SUMMARY: - Temporary central venous catheter removal - Tunneled central venous catheter insertion with fluoroscopic guidance - Additional procedure(s): None INDICATION: Convert the temporary dialysis catheter to a tunneled catheter. Placed by IR on 8/25. Manuela Angles COMPLICATIONS: No immediate complications. ESTIMATED BLOOD LOSS (mL): Less than 10 CONTRAST: None. RADIATION DOSE: Fluoroscopy time: 0.4 min Reference air kerma: 4 mGy ANESTHESIA/SEDATION: Level of anesthesia/sedation: No sedation Anesthesia/sedation administered by: Not applicable Total intra-service sedation time (minutes): 0 PROCEDURE DETAILS: History and imaging of central venous access reviewed (QCDR): Yes Informed consent for the procedure including risks, benefits and alternatives was obtained and time-out was performed prior to the procedure. The site was prepared and draped using all elements of maximal sterile barrier technique including sterile gloves, sterile gown, cap, mask, large sterile sheet, sterile ultrasound probe cover, hand hygiene and cutaneous antisepsis with 2% chlorhexidine. Local anesthesia was administered. A wire was passed through the indwelling central venous catheter and into the central veins. The catheter was removed, and a peel-away sheath was placed.  An incision was made near the venous access site and the catheter  was tunneled subcutaneously to the venous access site. The catheter was advanced via a peel-away sheath into the vein under fluoroscopic guidance. Catheter tip location was fluoroscopically verified and a permanent image was stored. Catheter placed: 15.5 Turkmen by 28 cm (23 cuff-to-tip) Catheter size [place]): 15.5 [place] Catheter flush: Normal saline The access site was closed and a sterile bandage was applied. Access site closure technique: Tissue adhesive Catheter securement technique: Non-absorbable suture      Impression: Conversion of right-sided internal jugular non-tunneled central venous catheter for a tunneled dialysis catheter, with tip in the expected location of the right atrium. Plan: The catheter may be used immediately. Workstation performed: [id number]      IR lower extremity angiogram     Result Date: 8/29/2023  Narrative: Abdominal aortogram Pelvic angiogram Right lower extremity angiogram History: Osteomyelitis right foot with nonhealing ulcer Contrast: 85 mL of iodixanol (VISIPAQUE) Fluoro time: 5.3 min  DAP - 45.39 Number of Images: Multiple Radiation Dose: 260 mGy Conscious sedation time:  1 HOUR Technique: The patient was brought to the interventional radiology suite and identified verbally and by wrist band. The patient was placed supine on the table and the left groin was prepped and draped in the usual sterile fashion. Lidocaine was administered to the skin and a small skin incision was made. The left common femoral artery was then punctured percutaneously utilizing Seldinger technique. A Moduslyson wire was advanced into the abdominal aorta over which a 5 [place] vascular sheath was inserted and connected to a flush bag. A 4 Turkmen omni flush catheter was then advanced to the level of T12 and a digital subtraction abdominal aortogram was performed.  The catheter was pulled down to the distal aorta for a pelvic angiogram and then placed up and over the bifurcation into the right superficial femoral artery for right lower extremity angiogram. The catheter was removed and a left common femoral angiogram was performed through the sheath followed by left common femoral arteriotomy closure using the ProGlide Perclose closure device. The patient tolerated the procedure well including conscious sedation with no immediate complication seen.      Impression: Impression: Normal abdominal aortogram and pelvic angiogram except for a small focal atherosclerotic area of the right common iliac artery. The mid popliteal artery demonstrates a less than 50% stenosis. The distal popliteal artery demonstrates a 30% stenosis. The origin of the anterior tibial artery demonstrates a focal moderate stenosis. None of these areas appear flow-limiting. Otherwise widely patent three-vessel runoff with widely patent anterior tibial and posterior tibial arteries in the foot. The anterior and posterior tibial arteries in the foot demonstrate distal vessel disease. Workstation performed: [id number]      XR foot 3+ vw right     Result Date: 8/28/2023  Narrative: RIGHT FOOT INDICATION:   Osteomyelitis right first ray. COMPARISON: MRI right foot 8/23/2023 VIEWS:  XR FOOT 3+ VW RIGHT FINDINGS: There is no acute fracture or dislocation. Unchanged severe first metatarsophalangeal osteoarthritis. Previously reported marrow changes on MRI are radiographically occult. 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 Retrocalcaneal enthesophyte again noted. No periosteal reaction or cortical destruction to suggest osteomyelitis. Atherosclerotic calcifications. No soft tissue gas or radiopaque foreign body.      Impression: No definite radiographic evidence of osteomyelitis. Previously reported marrow changes on MRI are radiographically occult.  Workstation performed: [id number]      IR temporary dialysis catheter placement     Result Date: 8/25/2023  Narrative: Temporary dialysis catheter insertion Clinical History: Bacteremia, status post tunneled catheter removal on 8/23. Fluoroscopy time: 0.1 minutes. Procedure: After explaining the risks and benefits of the procedure to the patient, informed consent was obtained. All elements of maximal sterile barrier technique were followed (cap, mask, sterile gown, sterile gloves, large sterile sheet, hand hygiene, and 2% chlorhexidine for cutaneous antisepsis). The patient's right internal jugular vein was evaluated as a potential access site. The vein is patent, compressible, and free of thrombus. Under ultrasound guidance, a 21-gauge needle was used to access the internal jugular vein and dilated, through which a multipurpose 035 J-wire was advanced centrally under fluoroscopic guidance. A static ultrasound image was recorded. After dilating the tract, a 14 Portuguese 15 cm temporary dialysis catheter was inserted with its tip at the RA/SVC junction under fluoroscopic guidance. The catheter was secured in place with sutures and flushed per protocol. The patient tolerated the procedure well and left the department in stable condition.      Impression: Impression: Successful temporary dialysis catheter insertion. Workstation performed: [id number]      Echo complete w/ contrast if indicated     Result Date: 8/25/2023  Narrative: •  Left Ventricle: Left ventricular cavity size is normal. Wall thickness is mildly increased. The left ventricular ejection fraction is 55% by visual estimation. . Systolic function is low normal. Wall motion is normal. Diastolic function is abnormal. •  IVS: There is sigmoid appearance of the septum. •  Right Ventricle: Systolic function is normal. Normal tricuspid annular plane systolic excursion (TAPSE) > 1.7 cm. •  Left Atrium: The atrium is moderately dilated. Technically difficult study- no clear evidence of valvular vegetation or abscess.  Definity given for LV wall enhancement.      VAS lower limb arterial duplex, complete bilateral     Result Date: 8/24/2023  Narrative:  [facility] REPORT CLINICAL: Indications: Physician wants to determine patency of the arterial system secondary to plantar foot wounds and patient is Diabetic. Operative History: No prior cardiovascular surgeries. Risk Factors: The patient has history of HTN, Diabetes (NIDDM (oral meds)) and CKD. FINDINGS:  Segment                Right                   Left                                          Impression  PSV (cm/s)  Impression  PSV (cm/s)  Common Femoral Artery                     126                     128  Prox Profunda                             148                     110  Prox SFA                                  126                     126  Mid SFA                                   139                      96  Dist SFA                                   99                      94  Proximal Pop           <50%               272  50-75%             374  Distal Pop                                135                     198  Tibioperoneal          <50%               293                      43  Dist Post Tibial                          119                      28  Dist. Ant. Tibial                         128                      72  Dist Peroneal                              64                      44     CONCLUSION: Impression: RIGHT LOWER LIMB: Diffuse atherosclerotic disease noted throughout with elevated PSV of the proximal popliteal artery and tibioperoneal trunk suggesting a <50% stenosis. Ankle/Brachial index: unreliable due to non-compressible vessels. No Prior. PVR/ PPG tracings are normal. Metatarsal pressure of >200 mmHg. Great toe pressure of 95 mmHg, within the healing range. LEFT LOWER LIMB: Diffuse atherosclerotic disease noted throughout with a 50-75 % stenosis of the proximal popliteal artery. Ankle/Brachial index: unreliable due to non-compressible vessels. No Prior. PVR/ PPG tracings are normal. Metatarsal pressure of 170 mmHg. Great toe pressure of 98 mmHg, within the healing range. SIGNATURE: Electronically Signed by: Niyah Cruz MD, RPVI on 2023-08-24 05:40:03 PM     XR foot 3+ vw right     Result Date: 8/24/2023  Narrative: RIGHT FOOT INDICATION:   Rule out osteomyelitis. COMPARISON:  None VIEWS:  XR FOOT 3+ VW RIGHT Images: 3 FINDINGS: Marked degenerative changes of the first MTP joint with hallux valgus deformity. Hypertrophic osteoarthritic changes of the midfoot articulations with possible areas of bony ankylosis near the base of the first through third metatarsals. Atherosclerotic calcifications. Posterior calcaneal spurring. There may be soft tissue swelling in the plantar soft tissues adjacent to the first MTP joint. No acute fracture or dislocation.      Impression: 1. Possible soft tissue swelling in the soft tissues adjacent to the first MTP joint. Correlate with physical signs for infection or inflammation. 2. No radiographic evidence of osteomyelitis however radiographic changes are typically delayed. If this is of continued clinical concern further assessment with MRI and/or three-phase bone scan may be obtained for further assessment. 3. Extensive arthritic changes in the foot as discussed above. Workstation performed: [id number]      XR foot 2 vw left     Result Date: 8/24/2023  Narrative: LEFT FOOT INDICATION:   Diabetic foot ulcer submetatarsal 1 left foot. . COMPARISON:  None VIEWS:  XR FOOT 2 VW LEFT FINDINGS: There is no acute fracture or dislocation. Calcaneal spur(s) noted. Dorsal midfoot bony productive change. Old fracture deformity of the distal tibia. Hallux valgus. Degenerative changes at the first metatarsophalangeal joint. No lytic or blastic osseous lesion. Soft tissues are unremarkable. Vascular calcifications.      Impression: No definite radiographic evidence of osteomyelitis. If there is clinical concern, MRI can be obtained.  Workstation performed: [id number]      IR tunneled central line removal     Result Date: 8/24/2023  Narrative: Perma-Cath removal. Clinical History: Patient presenting for Perma-Cath removal due to bacteremia. The existing catheter was prepped and draped in the usual sterile fashion. Lidocaine was administered the skin and subcutaneous tissues. The cuff was dissected free, and the catheter was removed. Manual compression was applied to the puncture site and tunnel until hemostasis was achieved. The patient tolerated the procedure well and suffered no complications.      Impression: Impression: Successful Perma-Cath removal as described. Workstation performed: [id number]      XR chest portable     Result Date: 8/24/2023  Narrative: CHEST INDICATION:   Rule out pneumonia. COMPARISON: Radiograph chest 1/21/2022 EXAM PERFORMED/VIEWS:  XR CHEST PORTABLE FINDINGS: Right-sided dialysis catheter with distal tip overlying the right atrium. Cardiomediastinal silhouette appears unremarkable. Left basilar subsegmental atelectasis. Increased reticular opacity in the medial aspect of the right lung base compared to 1/21/2022. No pneumothorax or pleural effusion. Old fracture deformity of the right clavicular shaft. .      Impression: Increased reticular opacity in the medial aspect of the right lung base compared to 1/21/2022, which can represent infectious pneumonitis, aspiration pneumonitis or subsegmental atelectasis. Clinical correlation is recommended. The study was marked in [employer] for significant notification. Workstation performed: [id number]      MRI foot/forefoot toes right wo contrast     Result Date: 8/23/2023  Narrative: MRI FOOT/FOREFOOT TOES RIGHT WO CONTRAST INDICATION:   r/o osteomyelitis. COMPARISON: Plain film 8/21/2023. TECHNIQUE:  Multiplanar/multisequence MR of the right foot was performed. FINDINGS: SUBCUTANEOUS TISSUES: First submetatarsal skin ulcer with fluid extending to the adjacent joint space likely reflecting sinus tract.  BONES: Advanced talonavicular degenerative change with dorsal spurring and mild subluxation that may be neuropathic. Midfoot degenerative change consistent with diabetic history there appears to be partial fusion across the tarsometatarsal joints. FIRST MTP JOINT: Prominent first metatarsal phalangeal joint degenerative change with joint effusion noted. SESAMOID BONES: Tibial sesamoid demonstrates diffuse T1 replacement and increased T2 signal concerning for osteomyelitis with drainage from the tibial sesamoid to the plantar skin surface. The fibular sesamoid also demonstrates some T1 replacement with increased T2 signal again concerning for osteomyelitis. Minor marrow changes across the first metatarsal phalangeal joint. OTHER ARTICULAR SURFACE:  Normal. PLANTAR FASCIA:  Intact. LISFRANC LIGAMENT:  Intact. FOREFOOT TENDONS: Intact. INTERMETATARSAL REGIONS: No bursitis or Escalante's neuroma. MUSCULATURE: Fatty atrophic change throughout the right foot musculature consistent with diabetic history.      Impression: First submetatarsal head plantar skin ulceration with suspected draining sinus tract extending to the first metatarsal phalangeal joint. Findings raise suspicion for septic arthropathy including marrow changes involving both the tibial and fibular sesamoid bones consistent with osteomyelitis. Minimal marrow changes across the first metatarsal phalangeal joint at this time. The study was marked in [facility] for immediate notification. Workstation performed: [id number]      MRI brain wo contrast     Result Date: 8/22/2023  Narrative: MRI BRAIN WITHOUT CONTRAST INDICATION: stroke. COMPARISON:   CTA head and neck 8/20/2023 TECHNIQUE:  Multiplanar, multisequence imaging of the brain was performed. IMAGE QUALITY: Motion degraded examination. FINDINGS: BRAIN PARENCHYMA:  There is no discrete mass, mass effect or midline shift. There is no intracranial hemorrhage. There is no evidence of acute infarction and diffusion imaging is unremarkable.  VENTRICLES: Normal for the patient's age. SELLA AND PITUITARY GLAND: Evidence of a partially empty sella.      Impression: Limited, motion degraded examination. There is no evidence for an acute territorial infarct. Workstation performed: [id number]      CTA head and neck w wo contrast     Result Date: 8/20/2023  Narrative: CTA NECK AND BRAIN WITH AND WITHOUT CONTRAST INDICATION: Syncope, simple, abnormal neuro exam Head trauma, abnormal mental status (Age 19-64y) Altered mental status COMPARISON:   None. TECHNIQUE:  Routine CT imaging of the Brain without contrast.  Post contrast imaging was performed after administration of iodinated contrast through the neck and brain. Post contrast axial 0.625 mm images timed to opacify the arterial system. 3D rendering was performed on an independent workstation. MIP reconstructions performed. Coronal reconstructions were performed of the noncontrast portion of the brain. Radiation dose length product (DLP) for this visit:  1385 mGy-cm . This examination, like all CT scans performed in the [facility], was performed utilizing techniques to minimize radiation dose exposure, including the use of iterative reconstruction and automated exposure control. IV Contrast:  85 mL of iohexol (OMNIPAQUE) IMAGE QUALITY:   Diagnostic FINDINGS: NONCONTRAST BRAIN PARENCHYMA: Mild anterior periventricular hypodensities, likely chronic microangiopathy. Tiny hypodensity right posterior inferior putamen, likely chronic lacunar infarct or dilated perivascular space. No intracranial mass, mass effect or midline shift. No CT signs of acute infarction. No acute parenchymal hemorrhage. VENTRICLES AND EXTRA-AXIAL SPACES:  Normal for the patient's age. VISUALIZED ORBITS: Normal visualized orbits. PARANASAL SINUSES: Normal visualized paranasal sinuses. CERVICAL VASCULATURE AORTIC ARCH AND GREAT VESSELS:  Normal aortic arch and great vessel origins.  Minimal calcified atherosclerotic disease in proximal left subclavian artery. Otherwise, normal visualized subclavian arteries. RIGHT VERTEBRAL ARTERY CERVICAL SEGMENT:  Normal origin. The vessel is normal in caliber throughout the neck. LEFT VERTEBRAL ARTERY CERVICAL SEGMENT:  Normal origin. The vessel is normal in caliber throughout the neck. RIGHT EXTRACRANIAL CAROTID SEGMENT:  Mild calcified atherosclerotic disease of the distal common carotid artery and proximal cervical internal carotid artery without significant stenosis compared to the more distal ICA. Less than 50% stenosis. No dissection. LEFT EXTRACRANIAL CAROTID SEGMENT: Mild calcified atherosclerotic disease of the distal common carotid artery and proximal cervical internal carotid artery without significant stenosis compared to the more distal ICA. Less than 50% stenosis. No dissection. NASCET criteria was used to determine the degree of internal carotid artery diameter stenosis. INTRACRANIAL VASCULATURE INTERNAL CAROTID ARTERIES:  Normal enhancement of the intracranial portions of the internal carotid arteries with mild calcified atherosclerotic disease bilaterally. Normal ophthalmic artery origins. Normal ICA terminus. ANTERIOR CIRCULATION:  Symmetric A1 segments and anterior cerebral arteries with normal enhancement. Normal anterior communicating artery. MIDDLE CEREBRAL ARTERY CIRCULATION:  M1 segment and middle cerebral artery branches demonstrate normal enhancement bilaterally. DISTAL VERTEBRAL ARTERIES: Patent distal vertebral arteries. Mild calcified atherosclerotic disease in bilateral vertebral artery V4 segments (left worse than right). Posterior inferior cerebellar artery origins are normal. Normal vertebral basilar junction. BASILAR ARTERY:  Basilar artery is normal in caliber. Normal superior cerebellar arteries. POSTERIOR CEREBRAL ARTERIES: Both posterior cerebral arteries arises from the basilar tip. Both arteries demonstrate normal enhancement.    Normal posterior communicating arteries. VENOUS STRUCTURES:  Normal. NON VASCULAR ANATOMY BONY STRUCTURES:  No acute osseous abnormality. Mild-to-moderate multilevel degenerative changes cervical spine with facet arthropathy, worse at C5-C6. Chronic C7 superior endplate compression fracture deformity with mild height loss. SOFT TISSUES OF THE NECK:  Unremarkable. THORACIC INLET: Large bore right jugular approach central venous catheter tip coursing out of field-of-view inferiorly into superior vena cava. No focal consolidation in visualized upper lung zones. Collapse of posterior aspect of trachea with narrowed tracheal airway, possibly due to tracheobronchomalacia.      Impression: No acute intracranial abnormality. Tiny hypodensity right posterior inferior putamen, likely chronic lacunar infarct or dilated perivascular space. Negative CTA head and neck for large vessel occlusion, dissection, aneurysm, or high-grade stenosis. Possible tracheobronchomalacia. Additional chronic/incidental findings as detailed above. The study was marked in [facility] for immediate notification. Workstation performed: [id number]         EKG/ Monitor: Personally reviewed.    Junctional rhythm     Code Status: Level 1 - Full Code  Advance Directive and Living Will:      POLST:          GRANT Hutson         "This note was completed in part utilizing [employer] direct voice recognition software.   Grammatical errors, random word insertion, spelling mistakes, and incomplete sentences may be an occasional consequence of the system secondary to software limitations, ambient noise and hardware issues.     Please read the chart carefully and recognize, using context, where substitutions have occurred.  If you have any questions or concerns about the context, text or information contained within the body of this dictation, please contact myself, the provider, for further clarification."

## 2023-09-11 NOTE — TELEMEDICINE
Consultation - Cardiology   [facility]      Daisha Samuel 58 y.o. male MRN: [id number]  : 1960  Unit/Bed#: [address] [place] [address] Encounter: [id number]        Assessment & Plan   1. Junctional/sinus bradycardia: Mildly symptomatic with complaints of generalized weakness    -   Hemodynamically stable    -   Heart rate improving after receiving calcium gluconate and starting hemodialysis    -   We will continue to monitor telemetry at this time    -   Not on AV marek blocking medication     2. Diabetes: Hemoglobin A1c 6.6    -   Managed per primary team     3. End-stage renal disease on hemodialysis: Managed per nephrology    -   Receiving hemodialysis treatment today     4. Polymicrobial bacteremia: On IV vancomycin    -   Infectious disease following     5. Diabetic foot ulcer: Managed per podiatry and wound team     6.   Hypertension: Blood pressure is currently stable    -   Continue Procardia XL 30 mg twice daily     Summary of Recommendations:         Thank you for your consultation.     Physician Requesting Consult: Lolis Watts DO     Reason for Consult / Principal Problem: Junctional rhythm     Inpatient consult to Cardiology  Consult performed by: GRANT Mercedes  Consult ordered by: GRANT Velazquez           HPI: Daisha Samuel is a 58y.o. year old male who was admitted to the hospital on 2023, after he was found on the floor of his home and he was unsure how he got there. He believes he was laying on the floor for 2 days yelling for help. He was found by 911 after daughter called for well person check. During hospitalization, he was found to have encephalopathy, polymicrobial bacteremia and a right diabetic foot ulcer with cellulitis for which she has had debridement. Has history of end-stage renal disease and undergoes dialysis.     Overnight patient became bradycardic and was placed on telemetry monitoring. Initial twelve-lead EKG appears to be junctional, at times on telemetry appears patient has sinus bradycardia. His potassium today was 5.7. He was given IV calcium gluconate 1 g per nephrology and started on hemodialysis this morning with improvement in his heart rate.     Patient only complaint was of feeling fatigued. He denied any dizziness. But activity is fairly limited due to presence of wound VAC on foot.     Cardiac testing 5/14/2023 at [facility] patient had TTE: Which noted LV cavity was grossly normal in size but could not exclude regional wall motion abnormalities due to suboptimal views, left atrial size was normal, there is mild mitral valve regurgitation and right side structures were not well visualized. Lexiscan nuclear stress test performed at [facility] in 2015 was read as a nonischemic study.     8/24/2023 TTE performed here at [facility] noted EF 55% with abnormal diastolic function, left atrium was moderately dilated and there was no clear evidence of valvular vegetation or abscess. Definity was given for LV wall enhancement.     Review of Systems   Constitutional: Positive for fatigue. Negative for activity change. HENT: Negative. Negative for congestion, ear discharge, postnasal drip and tinnitus. Eyes: Negative. Negative for photophobia and visual disturbance. Respiratory: Negative. Negative for chest tightness and shortness of breath. Cardiovascular: Negative.   Negative for chest pain, palpitations and leg swelling. Gastrointestinal: Negative. Negative for abdominal distention, blood in stool, constipation, diarrhea, nausea and vomiting. Endocrine: Negative. Negative for polydipsia, polyphagia and polyuria. Genitourinary: Negative. Negative for difficulty urinating. Musculoskeletal: Positive for gait problem. Skin: Negative. Neurological: Positive for weakness. Negative for dizziness, syncope and light-headedness. Hematological: Negative. Psychiatric/Behavioral: Negative.          Historical Information        Past Medical History:   Diagnosis Date   • CKD     • Diabetes mellitus ([address])     • Hyperlipidemia     • Hypertension     • Left toe amputee ([address])     • TIA (transient ischemic attack)              Past Surgical History:   Procedure Laterality Date   • AMPUTATION Left       left foot resection 10 years ago dr Dereck Jon   • IR [address]   08/29/2023   • IR TEMPORARY DIALYSIS CATHETER PLACEMENT   08/25/2023   • IR TUNNELED CENTRAL LINE REMOVAL   08/23/2023   • IR TUNNELED DIALYSIS CATHETER PLACEMENT   01/27/2022   • IR TUNNELED DIALYSIS CATHETER PLACEMENT   08/30/2023   • NV AMPUTATION METATARSAL W/TOE SINGLE Right 8/31/2023     Procedure: RIGHT PARTIAL 1ST RAY RESECTION WITH  WOUND VAC APPLICATION;  Surgeon: Tye Curry DPM;  Location: [facility];  Service: Podiatry      Social History           Substance and Sexual Activity   Alcohol Use Not Currently   • Alcohol/week: 0.0 standard drinks of alcohol     Comment: 0      Social History          Substance and Sexual Activity   Drug Use Never      Social History           Tobacco Use   Smoking Status Never   • Passive exposure: Never   Smokeless Tobacco Never      Family History: History reviewed.  No pertinent family history.     Meds/Allergies    PTA meds:        Medications Prior to Admission   Medication   • acetaminophen (TYLENOL) 325 mg tablet   • atorvastatin (LIPITOR) 80 mg tablet   • carvedilol (COREG) 6.25 mg tablet   • clopidogrel (PLAVIX) 75 mg tablet   • docusate sodium (COLACE) 100 mg capsule   • famotidine (PEPCID) 10 mg tablet   • lidocaine (LIDODERM) 5 %   • linaGLIPtin 5 MG TABS   • methocarbamol (ROBAXIN) 500 mg tablet   • NIFEdipine ER (ADALAT CC) 30 MG 24 hr tablet   • oxyCODONE (ROXICODONE) 5 immediate release tablet   • predniSONE 10 mg tablet   • sevelamer (RENAGEL) 800 mg tablet   • allopurinol (ZYLOPRIM) 100 mg tablet   • naloxone (NARCAN) 4 mg/0.1 mL nasal spray      No Known Allergies     Current Facility-Administered Medications:   •  acetaminophen (TYLENOL) tablet 650 mg, 650 mg, Oral, Q6H PRN, Regina Roberts DPM, 650 mg at 08/20/23 1141  •  acetaminophen (TYLENOL) tablet 975 mg, 975 mg, Oral, Q8H, Won Parmar DPM, 975 mg at 09/03/23 0500  •  atorvastatin (LIPITOR) tablet 80 mg, 80 mg, Oral, Daily, Won Parmar DPM, 80 mg at 09/04/23 1717  •  calcium carbonate (TUMS) chewable tablet 1,000 mg, 1,000 mg, Oral, BID PRN, GRANT Rich, 1,000 mg at 09/04/23 2235  •  famotidine (PEPCID) tablet 10 mg, 10 mg, Oral, Daily, Won Parmar DPM, 10 mg at 09/05/23 1043  •  gabapentin (NEURONTIN) oral solution 200 mg, 200 mg, Oral, BID, Won Parmar DPM, 200 mg at 09/04/23 1717  •  haloperidol lactate (HALDOL) injection 2 mg, 2 mg, Intramuscular, Q6H PRN, GANESH SerranoM, 2 mg at 08/23/23 1441  •  heparin (porcine) subcutaneous injection 5,000 Units, 5,000 Units, Subcutaneous, Q8H [address], Won Parmar DPM, 5,000 Units at 09/05/23 0502  •  HYDROmorphone (DILAUDID) injection 1 mg, 1 mg, Intravenous, Q4H PRN, Regina Roberts DPM, 1 mg at 08/25/23 2337  •  insulin glargine (LANTUS) subcutaneous injection 15 Units 0.15 mL, 15 Units, Subcutaneous, Won LEDEZMA DPM, 15 Units at 09/04/23 2115  •  insulin lispro (HumaLOG) 100 units/mL subcutaneous injection 1-5 Units, 1-5 Units, Subcutaneous, Won LEDEZMA DPM, 1 Units at 09/01/23 2241  •  insulin lispro (HumaLOG) 100 units/mL subcutaneous injection 2-12 Units, 2-12 Units, Subcutaneous, TID AC, 2 Units at 09/04/23 1220 **AND** Fingerstick Glucose (POCT), , , TID AC, Won Parmar DPM  •  insulin lispro (HumaLOG) 100 units/mL subcutaneous injection 5 Units, 5 Units, Subcutaneous, TID With Meals, Rome Landon, DPM, 5 Units at 09/04/23 1716  •  lidocaine (LIDODERM) 5 % patch 1 patch, 1 patch, Topical, Daily, Rome Landon, DPM, 1 patch at 08/25/23 8527  •  LORazepam (ATIVAN) injection 0.5 mg, 0.5 mg, Intravenous, Q6H PRN, Rome Landon, DPM, 0.5 mg at 08/31/23 0208  •  methocarbamol (ROBAXIN) tablet 500 mg, 500 mg, Oral, Q12H PRN, Rome Landon, DPM, 500 mg at 08/25/23 1659  •  NIFEdipine (PROCARDIA XL) 24 hr tablet 30 mg, 30 mg, Oral, BID, Won Parmar DPM, 30 mg at 09/04/23 1717  •  ondansetron (ZOFRAN) injection 4 mg, 4 mg, Intravenous, Q6H PRN, Rome Landon, DPM, 4 mg at 09/03/23 0441  •  oxyCODONE (ROXICODONE) IR tablet 5 mg, 5 mg, Oral, Q8H PRN, Rome Landon, DPM, 5 mg at 08/25/23 2221  •  polyethylene glycol (MIRALAX) packet 17 g, 17 g, Oral, Daily, Siomara Phelps PA-C, 17 g at 09/04/23 0848  •  senna-docusate sodium (SENOKOT S) 8.6-50 mg per tablet 2 tablet, 2 tablet, Oral, BID, Rome Landon, DPM, 2 tablet at 09/04/23 1717  •  sevelamer (RENAGEL) tablet 800 mg, 800 mg, Oral, TID With Meals, Rome Landon, DPM, 800 mg at 09/04/23 1717  •  vancomycin (VANCOCIN) IVPB (premix in dextrose) 750 mg 150 mL, 750 mg, Intravenous, Once per day on Tue Thu Meaghan Rich PA-C     VTE Pharmacologic Prophylaxis:   Sequential compression device Rashawn Martinez      Objective:   Vitals: Blood pressure 139/60, pulse 68, temperature 97.9 °F (36.6 °C), temperature source Oral, resp. rate 18, height 6' 1" (1.854 m), weight 115 kg (253 lb), SpO2 98 %. Body mass index is 33.38 kg/m².       Wt Readings from Last 3 Encounters:   08/24/23 115 kg (253 lb)   07/31/22 102 kg (225 lb 6.4 oz)   07/06/22 106 kg (233 lb 11 oz)          BP Readings from Last 3 Encounters:   09/05/23 139/60   07/31/22 118/60   07/14/22 139/52          Orthostatic Blood Pressures    Flowsheet Row Most Recent Value   Blood Pressure 139/60 filed at 09/05/2023 1030   Patient Position - Orthostatic VS Lying filed at 09/05/2023 0804             Intake/Output Summary (Last 24 hours) at 9/5/2023 1043  Last data filed at 9/5/2023 0900      Gross per 24 hour   Intake 440 ml   Output 600 ml   Net -160 ml             Invasive Devices            Peripheral Intravenous Line  Duration             Peripheral IV 09/02/23 Distal;Left;Upper;Ventral (anterior) Arm 2 days            Hemodialysis Catheter  Duration             HD Permanent Double Catheter 6 days                       Physical Exam:   Physical Exam  Vitals and nursing note reviewed. Constitutional:       General: He is not in acute distress. Appearance: Normal appearance. He is obese. HENT:      Right Ear: External ear normal.      Left Ear: External ear normal.      Nose: Nose normal.   Eyes:      General: No scleral icterus. Right eye: No discharge. Left eye: No discharge. Cardiovascular:      Rate and Rhythm: Regular rhythm. Bradycardia present. Pulses: Normal pulses. Heart sounds: Murmur heard. Pulmonary:      Effort: Pulmonary effort is normal. No respiratory distress. Breath sounds: Normal breath sounds. Abdominal:      General: Bowel sounds are normal. There is no distension. Palpations: Abdomen is soft. Musculoskeletal:      Right lower leg: No edema. Left lower leg: No edema. Comments: Wound VAC to right foot   Skin:     General: Skin is warm and dry. Capillary Refill: Capillary refill takes less than 2 seconds. Neurological:      General: No focal deficit present. Mental Status: He is alert and oriented to person, place, and time. Mental status is at baseline.    Psychiatric:         Mood and Affect: Mood normal.            Labs:   Troponins:       Results from last 7 days   Lab Units 09/05/23  0641   HS TNI RAND ng/L 9         CBC with diff:              Results from last 7 days   Lab Units 09/05/23  0458 09/04/23  0558 09/03/23  0456 09/02/23  0555 09/01/23  0522 08/31/23  0450 08/30/23  0553   WBC Thousand/uL 12.43* 9.99 11.97* 11.48* 14.32* 13.20* 14.70*   HEMOGLOBIN g/dL 8.9* 9.1* 10.2* 9.3* 9.3* 9.7* 9.3*   HEMATOCRIT % 28.4* 28.7* 32.3* 29.7* 30.1* 31.1* 30.0*   MCV fL 94 94 92 93 92 93 93   PLATELETS Thousands/uL 324 322 368 313 297 302 302   RBC Million/uL 3.02* 3.07* 3.53* 3.19* 3.26* 3.33* 3.23*   MCH pg 29.5 29.6 28.9 29.2 28.5 29.1 28.8   MCHC g/dL 31.3* 31.7 31.6 31.3* 30.9* 31.2* 31.0*   RDW % 17.8* 17.5* 17.5* 17.4* 17.2* 17.5* 17.2*   MPV fL 10.2 9.6 9.5 9.6 9.4 9.7 9.5         CMP:              Results from last 7 days   Lab Units 09/05/23  0458 09/04/23  0558 09/03/23  0456 09/02/23  0555 09/01/23  0522 08/31/23  0450 08/30/23  0553   SODIUM mmol/L 133* 133* 134* 134* 133* 135 134*   POTASSIUM mmol/L 5.7* 5.2 4.5 4.3 5.0 4.5 4.2   CHLORIDE mmol/L 93* 94* 93* 97 98 101 100   CO2 mmol/L 31 30 32 28 25 25 25   ANION GAP mmol/L 9 9 9 9 10 9 9   BUN mg/dL 61* 44* 29* 41* 31* 47* 38*   CREATININE mg/dL 7.93* 6.61* 5.23* 7.03* 5.85* 7.76* 6.49*   CALCIUM mg/dL 8.5 8.5 8.7 8.0* 8.2* 8.3* 8.2*   EGFR ml/min/1.73sq m 6 8 10 7 9 6 8   GLUCOSE RANDOM mg/dL 113 121 127 132 164* 106 132         Magnesium:   Results from last 7 days   Lab Units 08/31/23  0450   MAGNESIUM mg/dL 2.2      Coags:     TSH:      No components found for: "TSH3"  Lipid Profile:     Lipid Profile:   No results found for: "CHOLESTEROL", "HDL", "LDLCALC", "TRIG"  Hgb A1c:     NT-proBNP: No results for input(s): "NTBNP" in the last 72 hours.      Imaging & Testing         Imaging: I have personally reviewed pertinent reports.     VAS lower limb arterial duplex, limited, unilateral     Result Date: 9/1/2023  Narrative:  [facility] REPORT CLINICAL: Indications: Patient presents with an ulcer on his right plantar which started several days ago. Patient denies any pain with walking. Operative History: 2023-08-30 Right tunneled dialysis catheter placement 2023-08-25 dialysis catheter placement Left toe amputation date unknown Risk Factors The patient has history of HTN, Diabetes (NIDDM (oral meds)) and CKD. Clinical Right Pressure:  145/ mm Hg, Left Pressure:  139/ mm Hg. FINDINGS:  Right                  Impression       PSV (cm/s)  Common Femoral Artery  Diffuse Disease         127  Prox Profunda                                   64  Prox SFA                                       135  Mid SFA                                        135  Dist SFA                                       106  Proximal Pop                                   262  Distal Pop                                      91  Tibioperoneal          <50%                    282  Prox Post Tibial                                83  Dist Post Tibial                               103  Prox. Ant. Tibial                              127  Dist. Ant. Tibial                              137  Prox Peroneal                                  103  Dist Peroneal                                   77     CONCLUSION: Impression: RIGHT LOWER LIMB: Diffuse disease noted throughout the femoral-popliteal arteries without significant focal stenosis. Evidence suggestive of TIb/Peroneal occlusive disease with a <50% stenosis at the proximal tibioperoneal trunk. Ankle/Brachial index: 1.66 , which unreliable due to non-compressible vessels. ( Prior: unreliable due to non-compressible vessels.) Metatarsal pressure of 205mmHg (Prior +200mmHg) Great toe pressure of 73 mmHg, within the normal healing range. (Prior 95 mmHg) PVR/ PPG tracings are normal.  LEFT LOWER LIMB: Ankle/Brachial index: 1.66, which is unreliable due to non-compressible vessels.  ( Prior: unreliable due to non-compressible vessels.) Metatarsal pressure of  +240mmHg Great toe pressure of 72 mmHg, within the normal healing range. PVR/ PPG tracings are normal.  Compared to previous study on 8/24/2023, there is no significant change. Recommend repeat testing in 3 months as per protocol unless otherwise indicated. SIGNATURE: Electronically Signed by: Sara Munoz MD on 2023-09-01 04:19:13 PM     XR foot right 3+ views     Result Date: 9/1/2023  Narrative: RIGHT FOOT INDICATION:  Postoperative imaging. COMPARISON: 8/26/2023 VIEWS:  XR FOOT 3+ VW RIGHT FINDINGS: Status post transmetatarsal amputation first ray. There is no acute fracture or dislocation. Lateral deviation of the second through fourth toes again noted. Extensive degenerative changes and bony fusion seen along the midfoot with hindfoot degenerative changes as well. Calcaneal spurs. No lytic or blastic osseous lesion. There are atherosclerotic calcifications. Wound VAC overlying the postoperative bed.      Impression: Unremarkable postoperative appearance. Workstation performed: [id number]      IR tunneled dialysis catheter placement     Result Date: 8/30/2023  Narrative: PROCEDURE: Conversion of non-tunneled to tunneled central venous catheter PROCEDURE SUMMARY: - Temporary central venous catheter removal - Tunneled central venous catheter insertion with fluoroscopic guidance - Additional procedure(s): None INDICATION: Convert the temporary dialysis catheter to a tunneled catheter. Placed by IR on 8/25. Jeremie Key COMPLICATIONS: No immediate complications. ESTIMATED BLOOD LOSS (mL): Less than 10 CONTRAST: None. RADIATION DOSE: Fluoroscopy time: 0.4 min Reference air kerma: 4 mGy ANESTHESIA/SEDATION: Level of anesthesia/sedation: No sedation Anesthesia/sedation administered by: Not applicable Total intra-service sedation time (minutes): 0 PROCEDURE DETAILS: History and imaging of central venous access reviewed (QCDR): Yes Informed consent for the procedure including risks, benefits and alternatives was obtained and time-out was performed prior to the procedure. The site was prepared and draped using all elements of maximal sterile barrier technique including sterile gloves, sterile gown, cap, mask, large sterile sheet, sterile ultrasound probe cover, hand hygiene and cutaneous antisepsis with 2% chlorhexidine. Local anesthesia was administered. A wire was passed through the indwelling central venous catheter and into the central veins. The catheter was removed, and a peel-away sheath was placed. An incision was made near the venous access site and the catheter  was tunneled subcutaneously to the venous access site. The catheter was advanced via a peel-away sheath into the vein under fluoroscopic guidance. Catheter tip location was fluoroscopically verified and a permanent image was stored. Catheter placed: 15.5 Bulgarian by 28 cm (23 cuff-to-tip) Catheter size [place]): 15.5 [place] Catheter flush: Normal saline The access site was closed and a sterile bandage was applied. Access site closure technique: Tissue adhesive Catheter securement technique: Non-absorbable suture      Impression: Conversion of right-sided internal jugular non-tunneled central venous catheter for a tunneled dialysis catheter, with tip in the expected location of the right atrium. Plan: The catheter may be used immediately. Workstation performed: [id number]      IR lower extremity angiogram     Result Date: 8/29/2023  Narrative: Abdominal aortogram Pelvic angiogram Right lower extremity angiogram History: Osteomyelitis right foot with nonhealing ulcer Contrast: 85 mL of iodixanol (VISIPAQUE) Fluoro time: 5.3 min  DAP - 45.39 Number of Images: Multiple Radiation Dose: 260 mGy Conscious sedation time:  1 HOUR Technique: The patient was brought to the interventional radiology suite and identified verbally and by wrist band. The patient was placed supine on the table and the left groin was prepped and draped in the usual sterile fashion.  Lidocaine was administered to the skin and a small skin incision was made. The left common femoral artery was then punctured percutaneously utilizing Seldinger technique. A Yasmoson wire was advanced into the abdominal aorta over which a 5 [place] vascular sheath was inserted and connected to a flush bag. A 4 Vietnamese omni flush catheter was then advanced to the level of T12 and a digital subtraction abdominal aortogram was performed. The catheter was pulled down to the distal aorta for a pelvic angiogram and then placed up and over the bifurcation into the right superficial femoral artery for right lower extremity angiogram. The catheter was removed and a left common femoral angiogram was performed through the sheath followed by left common femoral arteriotomy closure using the ProGlide Perclose closure device. The patient tolerated the procedure well including conscious sedation with no immediate complication seen.      Impression: Impression: Normal abdominal aortogram and pelvic angiogram except for a small focal atherosclerotic area of the right common iliac artery. The mid popliteal artery demonstrates a less than 50% stenosis. The distal popliteal artery demonstrates a 30% stenosis. The origin of the anterior tibial artery demonstrates a focal moderate stenosis. None of these areas appear flow-limiting. Otherwise widely patent three-vessel runoff with widely patent anterior tibial and posterior tibial arteries in the foot. The anterior and posterior tibial arteries in the foot demonstrate distal vessel disease. Workstation performed: [id number]      XR foot 3+ vw right     Result Date: 8/28/2023  Narrative: RIGHT FOOT INDICATION:   Osteomyelitis right first ray. COMPARISON: MRI right foot 8/23/2023 VIEWS:  XR FOOT 3+ VW RIGHT FINDINGS: There is no acute fracture or dislocation. Unchanged severe first metatarsophalangeal osteoarthritis. Previously reported marrow changes on MRI are radiographically occult.  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 Retrocalcaneal enthesophyte again noted. No periosteal reaction or cortical destruction to suggest osteomyelitis. Atherosclerotic calcifications. No soft tissue gas or radiopaque foreign body.      Impression: No definite radiographic evidence of osteomyelitis. Previously reported marrow changes on MRI are radiographically occult. Workstation performed: [id number]      IR temporary dialysis catheter placement     Result Date: 8/25/2023  Narrative: Temporary dialysis catheter insertion Clinical History: Bacteremia, status post tunneled catheter removal on 8/23. Fluoroscopy time: 0.1 minutes. Procedure: After explaining the risks and benefits of the procedure to the patient, informed consent was obtained. All elements of maximal sterile barrier technique were followed (cap, mask, sterile gown, sterile gloves, large sterile sheet, hand hygiene, and 2% chlorhexidine for cutaneous antisepsis). The patient's right internal jugular vein was evaluated as a potential access site. The vein is patent, compressible, and free of thrombus. Under ultrasound guidance, a 21-gauge needle was used to access the internal jugular vein and dilated, through which a multipurpose 035 J-wire was advanced centrally under fluoroscopic guidance. A static ultrasound image was recorded. After dilating the tract, a 14 Tajik 15 cm temporary dialysis catheter was inserted with its tip at the RA/SVC junction under fluoroscopic guidance. The catheter was secured in place with sutures and flushed per protocol. The patient tolerated the procedure well and left the department in stable condition.      Impression: Impression: Successful temporary dialysis catheter insertion. Workstation performed: [id number]      Echo complete w/ contrast if indicated     Result Date: 8/25/2023  Narrative: •  Left Ventricle: Left ventricular cavity size is normal. Wall thickness is mildly increased.  The left ventricular ejection fraction is 55% by visual estimation. . Systolic function is low normal. Wall motion is normal. Diastolic function is abnormal. •  IVS: There is sigmoid appearance of the septum. •  Right Ventricle: Systolic function is normal. Normal tricuspid annular plane systolic excursion (TAPSE) > 1.7 cm. •  Left Atrium: The atrium is moderately dilated. Technically difficult study- no clear evidence of valvular vegetation or abscess. Definity given for LV wall enhancement.      VAS lower limb arterial duplex, complete bilateral     Result Date: 8/24/2023  Narrative:  [facility] REPORT CLINICAL: Indications: Physician wants to determine patency of the arterial system secondary to plantar foot wounds and patient is Diabetic. Operative History: No prior cardiovascular surgeries. Risk Factors: The patient has history of HTN, Diabetes (NIDDM (oral meds)) and CKD. FINDINGS:  Segment                Right                   Left                                          Impression  PSV (cm/s)  Impression  PSV (cm/s)  Common Femoral Artery                     126                     128  Prox Profunda                             148                     110  Prox SFA                                  126                     126  Mid SFA                                   139                      96  Dist SFA                                   99                      94  Proximal Pop           <50%               272  50-75%             374  Distal Pop                                135                     198  Tibioperoneal          <50%               293                      43  Dist Post Tibial                          119                      28  Dist. Ant.  Tibial                         128                      72  Dist Peroneal                              64                      44     CONCLUSION: Impression: RIGHT LOWER LIMB: Diffuse atherosclerotic disease noted throughout with elevated PSV of the proximal popliteal artery and tibioperoneal trunk suggesting a <50% stenosis. Ankle/Brachial index: unreliable due to non-compressible vessels. No Prior. PVR/ PPG tracings are normal. Metatarsal pressure of >200 mmHg. Great toe pressure of 95 mmHg, within the healing range. LEFT LOWER LIMB: Diffuse atherosclerotic disease noted throughout with a 50-75 % stenosis of the proximal popliteal artery. Ankle/Brachial index: unreliable due to non-compressible vessels. No Prior. PVR/ PPG tracings are normal. Metatarsal pressure of 170 mmHg. Great toe pressure of 98 mmHg, within the healing range. SIGNATURE: Electronically Signed by: Francisco Izaguirre MD, RPVI on 2023-08-24 05:40:03 PM     XR foot 3+ vw right     Result Date: 8/24/2023  Narrative: RIGHT FOOT INDICATION:   Rule out osteomyelitis. COMPARISON:  None VIEWS:  XR FOOT 3+ VW RIGHT Images: 3 FINDINGS: Marked degenerative changes of the first MTP joint with hallux valgus deformity. Hypertrophic osteoarthritic changes of the midfoot articulations with possible areas of bony ankylosis near the base of the first through third metatarsals. Atherosclerotic calcifications. Posterior calcaneal spurring. There may be soft tissue swelling in the plantar soft tissues adjacent to the first MTP joint. No acute fracture or dislocation.      Impression: 1. Possible soft tissue swelling in the soft tissues adjacent to the first MTP joint. Correlate with physical signs for infection or inflammation. 2. No radiographic evidence of osteomyelitis however radiographic changes are typically delayed. If this is of continued clinical concern further assessment with MRI and/or three-phase bone scan may be obtained for further assessment. 3. Extensive arthritic changes in the foot as discussed above. Workstation performed: [id number]      XR foot 2 vw left     Result Date: 8/24/2023  Narrative: LEFT FOOT INDICATION:   Diabetic foot ulcer submetatarsal 1 left foot. . COMPARISON:  None VIEWS:  XR FOOT 2 VW LEFT FINDINGS: There is no acute fracture or dislocation. Calcaneal spur(s) noted. Dorsal midfoot bony productive change. Old fracture deformity of the distal tibia. Hallux valgus. Degenerative changes at the first metatarsophalangeal joint. No lytic or blastic osseous lesion. Soft tissues are unremarkable. Vascular calcifications.      Impression: No definite radiographic evidence of osteomyelitis. If there is clinical concern, MRI can be obtained. Workstation performed: [id number]      IR tunneled central line removal     Result Date: 8/24/2023  Narrative: Perma-Cath removal. Clinical History: Patient presenting for Perma-Cath removal due to bacteremia. The existing catheter was prepped and draped in the usual sterile fashion. Lidocaine was administered the skin and subcutaneous tissues. The cuff was dissected free, and the catheter was removed. Manual compression was applied to the puncture site and tunnel until hemostasis was achieved. The patient tolerated the procedure well and suffered no complications.      Impression: Impression: Successful Perma-Cath removal as described. Workstation performed: [id number]      XR chest portable     Result Date: 8/24/2023  Narrative: CHEST INDICATION:   Rule out pneumonia. COMPARISON: Radiograph chest 1/21/2022 EXAM PERFORMED/VIEWS:  XR CHEST PORTABLE FINDINGS: Right-sided dialysis catheter with distal tip overlying the right atrium. Cardiomediastinal silhouette appears unremarkable. Left basilar subsegmental atelectasis. Increased reticular opacity in the medial aspect of the right lung base compared to 1/21/2022. No pneumothorax or pleural effusion. Old fracture deformity of the right clavicular shaft. .      Impression: Increased reticular opacity in the medial aspect of the right lung base compared to 1/21/2022, which can represent infectious pneumonitis, aspiration pneumonitis or subsegmental atelectasis. Clinical correlation is recommended. The study was marked in [employer] for significant notification. Workstation performed: [id number]      MRI foot/forefoot toes right wo contrast     Result Date: 8/23/2023  Narrative: MRI FOOT/FOREFOOT TOES RIGHT WO CONTRAST INDICATION:   r/o osteomyelitis. COMPARISON: Plain film 8/21/2023. TECHNIQUE:  Multiplanar/multisequence MR of the right foot was performed. FINDINGS: SUBCUTANEOUS TISSUES: First submetatarsal skin ulcer with fluid extending to the adjacent joint space likely reflecting sinus tract. BONES: Advanced talonavicular degenerative change with dorsal spurring and mild subluxation that may be neuropathic. Midfoot degenerative change consistent with diabetic history there appears to be partial fusion across the tarsometatarsal joints. FIRST MTP JOINT: Prominent first metatarsal phalangeal joint degenerative change with joint effusion noted. SESAMOID BONES: Tibial sesamoid demonstrates diffuse T1 replacement and increased T2 signal concerning for osteomyelitis with drainage from the tibial sesamoid to the plantar skin surface. The fibular sesamoid also demonstrates some T1 replacement with increased T2 signal again concerning for osteomyelitis. Minor marrow changes across the first metatarsal phalangeal joint. OTHER ARTICULAR SURFACE:  Normal. PLANTAR FASCIA:  Intact. LISFRANC LIGAMENT:  Intact. FOREFOOT TENDONS: Intact. INTERMETATARSAL REGIONS: No bursitis or Escalante's neuroma. MUSCULATURE: Fatty atrophic change throughout the right foot musculature consistent with diabetic history.      Impression: First submetatarsal head plantar skin ulceration with suspected draining sinus tract extending to the first metatarsal phalangeal joint. Findings raise suspicion for septic arthropathy including marrow changes involving both the tibial and fibular sesamoid bones consistent with osteomyelitis. Minimal marrow changes across the first metatarsal phalangeal joint at this time. The study was marked in [facility] for immediate notification.  Workstation performed: [id number]      MRI brain wo contrast     Result Date: 8/22/2023  Narrative: MRI BRAIN WITHOUT CONTRAST INDICATION: stroke. COMPARISON:   CTA head and neck 8/20/2023 TECHNIQUE:  Multiplanar, multisequence imaging of the brain was performed. IMAGE QUALITY: Motion degraded examination. FINDINGS: BRAIN PARENCHYMA:  There is no discrete mass, mass effect or midline shift. There is no intracranial hemorrhage. There is no evidence of acute infarction and diffusion imaging is unremarkable. VENTRICLES:  Normal for the patient's age. SELLA AND PITUITARY GLAND: Evidence of a partially empty sella.      Impression: Limited, motion degraded examination. There is no evidence for an acute territorial infarct. Workstation performed: [id number]      CTA head and neck w wo contrast     Result Date: 8/20/2023  Narrative: CTA NECK AND BRAIN WITH AND WITHOUT CONTRAST INDICATION: Syncope, simple, abnormal neuro exam Head trauma, abnormal mental status (Age 19-64y) Altered mental status COMPARISON:   None. TECHNIQUE:  Routine CT imaging of the Brain without contrast.  Post contrast imaging was performed after administration of iodinated contrast through the neck and brain. Post contrast axial 0.625 mm images timed to opacify the arterial system. 3D rendering was performed on an independent workstation. MIP reconstructions performed. Coronal reconstructions were performed of the noncontrast portion of the brain. Radiation dose length product (DLP) for this visit:  1385 mGy-cm . This examination, like all CT scans performed in the [facility], was performed utilizing techniques to minimize radiation dose exposure, including the use of iterative reconstruction and automated exposure control. IV Contrast:  85 mL of iohexol (OMNIPAQUE) IMAGE QUALITY:   Diagnostic FINDINGS: NONCONTRAST BRAIN PARENCHYMA: Mild anterior periventricular hypodensities, likely chronic microangiopathy.  Tiny hypodensity right posterior inferior putamen, likely chronic lacunar infarct or dilated perivascular space. No intracranial mass, mass effect or midline shift. No CT signs of acute infarction. No acute parenchymal hemorrhage. VENTRICLES AND EXTRA-AXIAL SPACES:  Normal for the patient's age. VISUALIZED ORBITS: Normal visualized orbits. PARANASAL SINUSES: Normal visualized paranasal sinuses. CERVICAL VASCULATURE AORTIC ARCH AND GREAT VESSELS:  Normal aortic arch and great vessel origins. Minimal calcified atherosclerotic disease in proximal left subclavian artery. Otherwise, normal visualized subclavian arteries. RIGHT VERTEBRAL ARTERY CERVICAL SEGMENT:  Normal origin. The vessel is normal in caliber throughout the neck. LEFT VERTEBRAL ARTERY CERVICAL SEGMENT:  Normal origin. The vessel is normal in caliber throughout the neck. RIGHT EXTRACRANIAL CAROTID SEGMENT:  Mild calcified atherosclerotic disease of the distal common carotid artery and proximal cervical internal carotid artery without significant stenosis compared to the more distal ICA. Less than 50% stenosis. No dissection. LEFT EXTRACRANIAL CAROTID SEGMENT: Mild calcified atherosclerotic disease of the distal common carotid artery and proximal cervical internal carotid artery without significant stenosis compared to the more distal ICA. Less than 50% stenosis. No dissection. NASCET criteria was used to determine the degree of internal carotid artery diameter stenosis. INTRACRANIAL VASCULATURE INTERNAL CAROTID ARTERIES:  Normal enhancement of the intracranial portions of the internal carotid arteries with mild calcified atherosclerotic disease bilaterally. Normal ophthalmic artery origins. Normal ICA terminus. ANTERIOR CIRCULATION:  Symmetric A1 segments and anterior cerebral arteries with normal enhancement. Normal anterior communicating artery. MIDDLE CEREBRAL ARTERY CIRCULATION:  M1 segment and middle cerebral artery branches demonstrate normal enhancement bilaterally.  DISTAL VERTEBRAL ARTERIES: Patent distal vertebral arteries. Mild calcified atherosclerotic disease in bilateral vertebral artery V4 segments (left worse than right). Posterior inferior cerebellar artery origins are normal. Normal vertebral basilar junction. BASILAR ARTERY:  Basilar artery is normal in caliber. Normal superior cerebellar arteries. POSTERIOR CEREBRAL ARTERIES: Both posterior cerebral arteries arises from the basilar tip. Both arteries demonstrate normal enhancement. Normal posterior communicating arteries. VENOUS STRUCTURES:  Normal. NON VASCULAR ANATOMY BONY STRUCTURES:  No acute osseous abnormality. Mild-to-moderate multilevel degenerative changes cervical spine with facet arthropathy, worse at C5-C6. Chronic C7 superior endplate compression fracture deformity with mild height loss. SOFT TISSUES OF THE NECK:  Unremarkable. THORACIC INLET: Large bore right jugular approach central venous catheter tip coursing out of field-of-view inferiorly into superior vena cava. No focal consolidation in visualized upper lung zones. Collapse of posterior aspect of trachea with narrowed tracheal airway, possibly due to tracheobronchomalacia.      Impression: No acute intracranial abnormality. Tiny hypodensity right posterior inferior putamen, likely chronic lacunar infarct or dilated perivascular space. Negative CTA head and neck for large vessel occlusion, dissection, aneurysm, or high-grade stenosis. Possible tracheobronchomalacia. Additional chronic/incidental findings as detailed above. The study was marked in [facility] for immediate notification. Workstation performed: [id number]         EKG/ Monitor: Personally reviewed.    Junctional rhythm     Code Status: Level 1 - Full Code  Advance Directive and Living Will:      POLST:          Waldemar Blizzard, CRNP         "This note was completed in part utilizing m-modal fluency direct voice recognition software.   Grammatical errors, random word insertion, spelling mistakes, and incomplete sentences may be an occasional consequence of the system secondary to software limitations, ambient noise and hardware issues.     Please read the chart carefully and recognize, using context, where substitutions have occurred.  If you have any questions or concerns about the context, text or information contained within the body of this dictation, please contact myself, the provider, for further clarification."

## 2023-09-12 ENCOUNTER — DOCUMENTATION (OUTPATIENT)
Dept: CARDIOLOGY CLINIC | Facility: CLINIC | Age: 63
End: 2023-09-12

## 2023-09-13 ENCOUNTER — OFFICE VISIT (OUTPATIENT)
Age: 63
End: 2023-09-13

## 2023-09-13 VITALS
SYSTOLIC BLOOD PRESSURE: 136 MMHG | HEART RATE: 64 BPM | WEIGHT: 235 LBS | DIASTOLIC BLOOD PRESSURE: 76 MMHG | BODY MASS INDEX: 31.14 KG/M2 | HEIGHT: 73 IN

## 2023-09-13 DIAGNOSIS — E11.621 DIABETIC ULCER OF LEFT MIDFOOT ASSOCIATED WITH TYPE 2 DIABETES MELLITUS, WITH FAT LAYER EXPOSED (HCC): Primary | ICD-10-CM

## 2023-09-13 DIAGNOSIS — L97.422 DIABETIC ULCER OF LEFT MIDFOOT ASSOCIATED WITH TYPE 2 DIABETES MELLITUS, WITH FAT LAYER EXPOSED (HCC): Primary | ICD-10-CM

## 2023-09-14 ENCOUNTER — TELEPHONE (OUTPATIENT)
Age: 63
End: 2023-09-14

## 2023-09-14 NOTE — PROGRESS NOTES
This patient was seen on 9/13/23. My role is Foot , Ankle, and Wound Specialist    ASSESSMENT     Diagnoses and all orders for this visit:    Diabetic ulcer of left midfoot associated with type 2 diabetes mellitus, with fat layer exposed ([address])         Problem List Items Addressed This Visit        Endocrine    Diabetic ulcer of left midfoot associated with type 2 diabetes mellitus, limited to breakdown of skin ([address]) - Primary     PLAN  -Patient was educated regarding his condition  -Wound VAC removed, underlying ulceration appears granular, healthy, with no local signs of infection. Surrounding incision site is well-coapted with skin edges well approximated. Mild maceration is noted with no signs of infection.   -Wound debridement performed as below:  -Wound redressed with Maxorb, DSD, ACE bandage. Continue wound VAC with every other day dressing changes.   -Instructions sent to patients rehab facility. Recommend sutures out in 1-2 weeks. Continue wound VAC until full wound healing achieved. Debridement   Universal Protocol:  Consent: Verbal consent obtained. Consent given by: patient  Time out: Immediately prior to procedure a "time out" was called to verify the correct patient, procedure, equipment, support staff and site/side marked as required. Patient understanding: patient states understanding of the procedure being performed  Patient identity confirmed: verbally with patient      Performed by: physician  Debridement type: surgical  Level of debridement: subcutaneous tissue    Post-debridement measurements  Length (cm): 2.5  Width (cm): 2  Depth (cm): 0.6  Percent debrided: 100%  Surface Area (cm^2): 5  Area debrided (cm^2): 5  Volume (cm^3): 3          SUBJECTIVE    Chief Complaint:  R foot ulceration     Patient ID: Lester Thomas is a 58 y.o. male. 913/23: Sherly Orellana is a 57yo male who presents today with a right foot ulceration s/p partial 1st ray resection performed on 8/30/23.  He has been at a rehab facility which has been doing the dressing changes. He has been compliant with his NWB status. Patient denies nausea, vomiting, chest pain, shortness of breath, chills, fever. The following portions of the patient's history were reviewed and updated as appropriate: allergies, current medications, past family history, past medical history, past social history, past surgical history and problem list.    Review of Systems   Constitutional: Negative. HENT: Negative. Gastrointestinal: Negative. Musculoskeletal: Negative. Skin: Positive for color change and wound. OBJECTIVE      /76   Pulse 64   Ht 6' 1" (1.854 m)   Wt 107 kg (235 lb)   BMI 31.00 kg/m²        Physical Exam  Constitutional:       Appearance: Normal appearance. He is not ill-appearing or diaphoretic. HENT:      Head: Normocephalic and atraumatic. Eyes:      General:         Right eye: No discharge. Left eye: No discharge. Pulmonary:      Effort: Pulmonary effort is normal. No respiratory distress. Skin:     Capillary Refill: Capillary refill takes less than 2 seconds. Neurological:      Mental Status: He is alert.    Psychiatric:         Mood and Affect: Mood normal.      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  -MMT is 5/5 to all muscle compartments of the lower extremity  -Ankle dorsiflexion <10 degrees with knee extended and knee flexed b/l    Neuro:  -Light sensation absent bilaterally  -Protective sensation absent bilaterally with 10/10 points felt with Anna Raddle monofilament    Derm:  Wound #: 1  Location: right plantar medial foot  Length 2.5cm: Width 2.0cm: Depth 0.6cm:   Deepest Tissue Noted in Base: subcutaneous tissue  Probe to Bone: No  Peripheral Skin Description: Attached and macerated  Granulation: 100% Fibrotic Tissue: 0% Necrotic Tissue: 0%   Drainage Amount: minimal, serosanguinous  Signs of Infection: No

## 2023-09-14 NOTE — TELEPHONE ENCOUNTER
Caller:  [facility]    Doctor/Office: Yesi Veliz DPM    #: [phone]      What needs to be faxed: Any wound care orders. Wound care goes in every Tuesday. Can the wound care specialist remove Schultz stitches in two weeks.     ATTN to:  [address] [address]     CP#:[phone]

## 2023-09-15 NOTE — TELEPHONE ENCOUNTER
Spoke to [employer], I gave below instructions verbatim concerning below. I also added as per office not continue wound vac until wound is completely healed. They would like this in writing, so I will compose a letter and fax as requested.

## 2023-09-25 ENCOUNTER — TELEPHONE (OUTPATIENT)
Dept: PODIATRY | Facility: CLINIC | Age: 63
End: 2023-09-25

## 2023-09-25 NOTE — TELEPHONE ENCOUNTER
Caller: [facility]    Doctor/Office: Greyson Silveira DPM    CB#: [phone], ext: [phone]      What needs to be faxed: An update on Naresh's weight bearing status. He is using his right heel to walk. ATTN to: [facility]      Fax#: [phone]    I called back and spoke with Peter Knight, Occupational Therapist.  I relayed the message from Dr. Lucia Chew word for word.

## 2024-01-24 ENCOUNTER — HOSPITAL ENCOUNTER (EMERGENCY)
Facility: HOSPITAL | Age: 64
Discharge: HOME | End: 2024-01-24
Attending: EMERGENCY MEDICINE | Admitting: EMERGENCY MEDICINE
Payer: MEDICARE

## 2024-01-24 ENCOUNTER — APPOINTMENT (EMERGENCY)
Dept: RADIOLOGY | Facility: HOSPITAL | Age: 64
End: 2024-01-24
Payer: MEDICARE

## 2024-01-24 VITALS
TEMPERATURE: 98 F | RESPIRATION RATE: 20 BRPM | DIASTOLIC BLOOD PRESSURE: 84 MMHG | WEIGHT: 240 LBS | OXYGEN SATURATION: 95 % | HEART RATE: 82 BPM | HEIGHT: 73 IN | BODY MASS INDEX: 31.81 KG/M2 | SYSTOLIC BLOOD PRESSURE: 194 MMHG

## 2024-01-24 DIAGNOSIS — E87.5 HYPERKALEMIA: ICD-10-CM

## 2024-01-24 DIAGNOSIS — Z91.199 NONCOMPLIANCE: Primary | ICD-10-CM

## 2024-01-24 LAB
ALBUMIN SERPL BCP-MCNC: 3.7 G/DL (ref 3.5–5)
ALP SERPL-CCNC: 93 U/L (ref 34–104)
ALT SERPL W P-5'-P-CCNC: 43 U/L (ref 7–52)
ANION GAP SERPL CALCULATED.3IONS-SCNC: 20 MMOL/L
AST SERPL W P-5'-P-CCNC: 21 U/L (ref 13–39)
ATRIAL RATE: 84 BPM
BASOPHILS # BLD AUTO: 0.04 THOUSANDS/ÂΜL (ref 0–0.1)
BASOPHILS NFR BLD AUTO: 1 % (ref 0–1)
BILIRUB SERPL-MCNC: 0.28 MG/DL (ref 0.2–1)
BUN SERPL-MCNC: 129 MG/DL (ref 5–25)
CALCIUM SERPL-MCNC: 7.3 MG/DL (ref 8.4–10.2)
CHLORIDE SERPL-SCNC: 104 MMOL/L (ref 96–108)
CO2 SERPL-SCNC: 13 MMOL/L (ref 21–32)
CREAT SERPL-MCNC: 12.48 MG/DL (ref 0.6–1.3)
EOSINOPHIL # BLD AUTO: 0.71 THOUSAND/ÂΜL (ref 0–0.61)
EOSINOPHIL NFR BLD AUTO: 9 % (ref 0–6)
ERYTHROCYTE [DISTWIDTH] IN BLOOD BY AUTOMATED COUNT: 17.3 % (ref 11.6–15.1)
FLUAV RNA RESP QL NAA+PROBE: NEGATIVE
FLUBV RNA RESP QL NAA+PROBE: NEGATIVE
GFR SERPL CREATININE-BSD FRML MDRD: 3 ML/MIN/1.73SQ M
GLUCOSE SERPL-MCNC: 99 MG/DL (ref 65–140)
HCT VFR BLD AUTO: 29.6 % (ref 36.5–49.3)
HGB BLD-MCNC: 9.7 G/DL (ref 12–17)
IMM GRANULOCYTES # BLD AUTO: 0.03 THOUSAND/UL (ref 0–0.2)
IMM GRANULOCYTES NFR BLD AUTO: 0 % (ref 0–2)
LYMPHOCYTES # BLD AUTO: 1.05 THOUSANDS/ÂΜL (ref 0.6–4.47)
LYMPHOCYTES NFR BLD AUTO: 13 % (ref 14–44)
MCH RBC QN AUTO: 29 PG (ref 26.8–34.3)
MCHC RBC AUTO-ENTMCNC: 32.8 G/DL (ref 31.4–37.4)
MCV RBC AUTO: 89 FL (ref 82–98)
MONOCYTES # BLD AUTO: 0.75 THOUSAND/ÂΜL (ref 0.17–1.22)
MONOCYTES NFR BLD AUTO: 9 % (ref 4–12)
NEUTROPHILS # BLD AUTO: 5.47 THOUSANDS/ÂΜL (ref 1.85–7.62)
NEUTS SEG NFR BLD AUTO: 68 % (ref 43–75)
NRBC BLD AUTO-RTO: 0 /100 WBCS
P AXIS: -27 DEGREES
PLATELET # BLD AUTO: 266 THOUSANDS/UL (ref 149–390)
PMV BLD AUTO: 9.8 FL (ref 8.9–12.7)
POTASSIUM SERPL-SCNC: 6.1 MMOL/L (ref 3.5–5.3)
PR INTERVAL: 172 MS
PROT SERPL-MCNC: 7.6 G/DL (ref 6.4–8.4)
QRS AXIS: -16 DEGREES
QRSD INTERVAL: 80 MS
QT INTERVAL: 404 MS
QTC INTERVAL: 477 MS
RBC # BLD AUTO: 3.34 MILLION/UL (ref 3.88–5.62)
RSV RNA RESP QL NAA+PROBE: NEGATIVE
SARS-COV-2 RNA RESP QL NAA+PROBE: NEGATIVE
SODIUM SERPL-SCNC: 137 MMOL/L (ref 135–147)
T WAVE AXIS: 82 DEGREES
VENTRICULAR RATE: 84 BPM
WBC # BLD AUTO: 8.05 THOUSAND/UL (ref 4.31–10.16)

## 2024-01-24 PROCEDURE — 36415 COLL VENOUS BLD VENIPUNCTURE: CPT | Performed by: EMERGENCY MEDICINE

## 2024-01-24 PROCEDURE — 85025 COMPLETE CBC W/AUTO DIFF WBC: CPT | Performed by: EMERGENCY MEDICINE

## 2024-01-24 PROCEDURE — 93005 ELECTROCARDIOGRAM TRACING: CPT

## 2024-01-24 PROCEDURE — 80053 COMPREHEN METABOLIC PANEL: CPT | Performed by: EMERGENCY MEDICINE

## 2024-01-24 PROCEDURE — 0241U HB NFCT DS VIR RESP RNA 4 TRGT: CPT | Performed by: EMERGENCY MEDICINE

## 2024-01-24 PROCEDURE — 71045 X-RAY EXAM CHEST 1 VIEW: CPT

## 2024-01-24 PROCEDURE — 99285 EMERGENCY DEPT VISIT HI MDM: CPT

## 2024-01-24 PROCEDURE — 99285 EMERGENCY DEPT VISIT HI MDM: CPT | Performed by: EMERGENCY MEDICINE

## 2024-01-24 NOTE — ED PROVIDER NOTES
History  Chief Complaint   Patient presents with    Weakness - Generalized     Dialysis patient who has not had dialysis times a week due to having flu like symptoms and today was told by dialysis center he needed to be checked prior to coming due to how long he has not been there     HPI  Patient is a 63-year-old male history of diabetes, hypertension, hyperlipidemia, end-stage renal disease on dialysis presenting for evaluation of generalized weakness, multiple missed dialysis sessions.  Patient states that he missed the last 3 dialysis sessions due to URI and flulike illness.  Patient states that this is overall improved but states that dialysis wanted him evaluated in the emergency department prior to him returning for further dialysis treatments.  Patient denies any significant shortness of breath, chest pain, states he has had some swelling of his legs bilaterally but otherwise feels okay.  Prior to Admission Medications   Prescriptions Last Dose Informant Patient Reported? Taking?   NIFEdipine ER (ADALAT CC) 30 MG 24 hr tablet   Yes No   Sig: Take 30 mg by mouth 2 (two) times a day   acetaminophen (TYLENOL) 325 mg tablet   No No   Sig: Take 3 tablets (975 mg total) by mouth every 8 (eight) hours   allopurinol (ZYLOPRIM) 100 mg tablet   Yes No   Sig: Take 100 mg by mouth daily   atorvastatin (LIPITOR) 80 mg tablet   Yes No   Sig: Take 80 mg by mouth daily   docusate sodium (COLACE) 100 mg capsule   No No   Sig: Take 1 capsule (100 mg total) by mouth 2 (two) times a day   famotidine (PEPCID) 10 mg tablet   No No   Sig: Take 1 tablet (10 mg total) by mouth daily   insulin glargine (LANTUS) 100 units/mL subcutaneous injection   No No   Sig: Inject 15 Units under the skin daily at bedtime   insulin lispro (HumaLOG) 100 units/mL injection   No No   Sig: Inject 5 Units under the skin 3 (three) times a day with meals   lidocaine (LIDODERM) 5 %   No No   Sig: Apply 1 patch topically over 12 hours daily Remove &  Discard patch within 12 hours or as directed by MD Do not start before September 7, 2023.   methocarbamol (ROBAXIN) 500 mg tablet   No No   Sig: Take 1 tablet (500 mg total) by mouth every 12 (twelve) hours as needed for muscle spasms (back/hip pain and spasm)   naloxone (NARCAN) 4 mg/0.1 mL nasal spray   No No   Sig: Administer 1 spray into a nostril. If no response after 2-3 minutes, give another dose in the other nostril using a new spray.   polyethylene glycol (MIRALAX) 17 g packet   No No   Sig: Take 17 g by mouth daily Do not start before September 7, 2023.   senna-docusate sodium (SENOKOT S) 8.6-50 mg per tablet   No No   Sig: Take 2 tablets by mouth 2 (two) times a day   sevelamer (RENAGEL) 800 mg tablet   No No   Sig: Take 1 tablet (800 mg total) by mouth 3 (three) times a day with meals      Facility-Administered Medications: None         Past Medical History:   Diagnosis Date    CKD     Diabetes mellitus (HCC)     Hyperlipidemia     Hypertension     Left toe amputee (HCC)     TIA (transient ischemic attack)        Past Surgical History:   Procedure Laterality Date    AMPUTATION Left     left foot resection 10 years ago dr tran    IR LOWER EXTREMITY ANGIOGRAM  08/29/2023    IR TEMPORARY DIALYSIS CATHETER PLACEMENT  08/25/2023    IR TUNNELED CENTRAL LINE REMOVAL  08/23/2023    IR TUNNELED DIALYSIS CATHETER PLACEMENT  01/27/2022    IR TUNNELED DIALYSIS CATHETER PLACEMENT  08/30/2023    ND AMPUTATION METATARSAL W/TOE SINGLE Right 8/31/2023    Procedure: RIGHT PARTIAL 1ST RAY RESECTION WITH  WOUND VAC APPLICATION;  Surgeon: Won Parmar DPM;  Location: [facility];  Service: Podiatry       History reviewed. No pertinent family history.  I have reviewed and agree with the history as documented.    E-Cigarette/Vaping    E-Cigarette Use Never User      E-Cigarette/Vaping Substances    Nicotine No     THC No     CBD No     Flavoring No     Other No     Unknown No      Social History     Tobacco Use    Smoking  status: Never     Passive exposure: Never    Smokeless tobacco: Never   Vaping Use    Vaping status: Never Used   Substance Use Topics    Alcohol use: Not Currently     Alcohol/week: 0.0 standard drinks of alcohol     Comment: 0    Drug use: Never       Review of Systems   Constitutional:  Positive for fatigue. Negative for chills and fever.   Respiratory:  Negative for cough and shortness of breath.    Cardiovascular:  Positive for leg swelling. Negative for chest pain.   Gastrointestinal:  Negative for diarrhea, nausea and vomiting.   Musculoskeletal:  Negative for arthralgias and myalgias.   Neurological:  Negative for headaches.   Psychiatric/Behavioral:  Negative for confusion.    All other systems reviewed and are negative.      Physical Exam  Physical Exam  Vitals and nursing note reviewed.   Constitutional:       General: He is not in acute distress.     Appearance: Normal appearance. He is not ill-appearing, toxic-appearing or diaphoretic.   HENT:      Head: Normocephalic and atraumatic.      Right Ear: External ear normal.      Left Ear: External ear normal.   Eyes:      General:         Right eye: No discharge.         Left eye: No discharge.   Pulmonary:      Effort: No respiratory distress.      Comments: No increased work of breathing.  Speaking in complete sentences.  Lungs clear to auscultation bilaterally without wheezes, rales, rhonchi.  Satting 95% on room air indicating adequate oxygenation.  Abdominal:      General: There is no distension.   Musculoskeletal:         General: No deformity.      Cervical back: Normal range of motion.      Comments: Moderate bilateral lower extremity edema   Skin:     Findings: No lesion or rash.   Neurological:      Mental Status: He is alert and oriented to person, place, and time. Mental status is at baseline.      Comments: Awake, alert, pleasant, interactive   Psychiatric:         Mood and Affect: Mood and affect normal.         Vital Signs  ED Triage Vitals  [01/24/24 1207]   Temperature Pulse Respirations Blood Pressure SpO2   98 °F (36.7 °C) 82 20 (!) 194/84 95 %      Temp Source Heart Rate Source Patient Position - Orthostatic VS BP Location FiO2 (%)   Oral Monitor Lying Left arm --      Pain Score       --           Vitals:    01/24/24 1207   BP: (!) 194/84   Pulse: 82   Patient Position - Orthostatic VS: Lying         Visual Acuity      ED Medications  Medications - No data to display    Diagnostic Studies  Results Reviewed       Procedure Component Value Units Date/Time    COVID/FLU/RSV [[id number]]  (Normal) Collected: 01/24/24 1247    Lab Status: Final result Specimen: Nares from Nose Updated: 01/24/24 1345     SARS-CoV-2 Negative     INFLUENZA A PCR Negative     INFLUENZA B PCR Negative     RSV PCR Negative    Narrative:      FOR PEDIATRIC PATIENTS - copy/paste COVID Guidelines URL to browser: [email or web address]    SARS-CoV-2 assay is a Nucleic Acid Amplification assay intended for the  qualitative detection of nucleic acid from SARS-CoV-2 in nasopharyngeal  swabs. Results are for the presumptive identification of SARS-CoV-2 RNA.    Positive results are indicative of infection with SARS-CoV-2, the virus  causing COVID-19, but do not rule out bacterial infection or co-infection  with other viruses. Laboratories within the [place] and its  territories are required to report all positive results to the appropriate  public health authorities. Negative results do not preclude SARS-CoV-2  infection and should not be used as the sole basis for treatment or other  patient management decisions. Negative results must be combined with  clinical observations, patient history, and epidemiological information.  This test has not been FDA cleared or approved.    This test has been authorized by FDA under an Emergency Use Authorization  (EUA). This test is only authorized for the duration of time the  declaration  that circumstances exist justifying the authorization of the  emergency use of an in vitro diagnostic tests for detection of SARS-CoV-2  virus and/or diagnosis of COVID-19 infection under section 564(b)(1) of  the Act, 21 U.S.C. 360bbb-3(b)(1), unless the authorization is terminated  or revoked sooner. The test has been validated but independent review by FDA  and CLIA is pending.    Test performed using [employer] GeneXpert: This RT-PCR assay targets N2,  a region unique to SARS-CoV-2. A conserved region in the E-gene was chosen  for pan-Sarbecovirus detection which includes SARS-CoV-2.    According to CMS-2020-01-R, this platform meets the definition of high-throughput technology.    Comprehensive metabolic panel [[id number]]  (Abnormal) Collected: 01/24/24 1247    Lab Status: Final result Specimen: Blood from Arm, Right Updated: 01/24/24 1313     Sodium 137 mmol/L      Potassium 6.1 mmol/L      Chloride 104 mmol/L      CO2 13 mmol/L      ANION GAP 20 mmol/L       mg/dL      Creatinine 12.48 mg/dL      Glucose 99 mg/dL      Calcium 7.3 mg/dL      AST 21 U/L      ALT 43 U/L      Alkaline Phosphatase 93 U/L      Total Protein 7.6 g/dL      Albumin 3.7 g/dL      Total Bilirubin 0.28 mg/dL      eGFR 3 ml/min/1.73sq m     Narrative:      National Kidney Disease Foundation guidelines for Chronic Kidney Disease (CKD):     Stage 1 with normal or high GFR (GFR > 90 mL/min/1.73 square meters)    Stage 2 Mild CKD (GFR = 60-89 mL/min/1.73 square meters)    Stage 3A Moderate CKD (GFR = 45-59 mL/min/1.73 square meters)    Stage 3B Moderate CKD (GFR = 30-44 mL/min/1.73 square meters)    Stage 4 Severe CKD (GFR = 15-29 mL/min/1.73 square meters)    Stage 5 End Stage CKD (GFR <15 mL/min/1.73 square meters)  Note: GFR calculation is accurate only with a steady state creatinine    CBC and differential [[id number]]  (Abnormal) Collected: 01/24/24 1247    Lab Status: Final result Specimen: Blood from Arm, Right Updated: 01/24/24  1256     WBC 8.05 Thousand/uL      RBC 3.34 Million/uL      Hemoglobin 9.7 g/dL      Hematocrit 29.6 %      MCV 89 fL      MCH 29.0 pg      MCHC 32.8 g/dL      RDW 17.3 %      MPV 9.8 fL      Platelets 266 Thousands/uL      nRBC 0 /100 WBCs      Neutrophils Relative 68 %      Immat GRANS % 0 %      Lymphocytes Relative 13 %      Monocytes Relative 9 %      Eosinophils Relative 9 %      Basophils Relative 1 %      Neutrophils Absolute 5.47 Thousands/µL      Immature Grans Absolute 0.03 Thousand/uL      Lymphocytes Absolute 1.05 Thousands/µL      Monocytes Absolute 0.75 Thousand/µL      Eosinophils Absolute 0.71 Thousand/µL      Basophils Absolute 0.04 Thousands/µL                    XR chest 1 view   Final Result by Jessie Thomas MD (01/24 1506)      Mild pulmonary venous congestion with small effusions and minimal left base atelectasis.               Workstation performed: [id number]                    Procedures  Procedures         ED Course                               SBIRT 22yo+      Flowsheet Row Most Recent Value   Initial Alcohol Screen: US AUDIT-C     1. How often do you have a drink containing alcohol? 0 Filed at: 01/24/2024 1212   2. How many drinks containing alcohol do you have on a typical day you are drinking?  0 Filed at: 01/24/2024 1212   3a. Male UNDER 65: How often do you have five or more drinks on one occasion? 0 Filed at: 01/24/2024 1212   3b. FEMALE Any Age, or MALE 65+: How often do you have 4 or more drinks on one occassion? 0 Filed at: 01/24/2024 1212   Audit-C Score 0 Filed at: 01/24/2024 1212   NICOLÁS: How many times in the past year have you...    Used an illegal drug or used a prescription medication for non-medical reasons? Never Filed at: 01/24/2024 1212                      Medical Decision Making  I obtained history from the patient.  Patient with 3 missed dialysis sessions due to probable viral illness which patient feels is improving.  Patient well-appearing with minimal  respiratory symptoms, normal oxygen saturation on room air, no rales on auscultation.  Patient does have bilateral lower extremity edema.  I ordered and reviewed lab work including CBC, CMP, as well as a COVID, flu, RSV swab.  Patient with a potassium of 6.1.  I ordered and independently interpreted an EKG which demonstrates normal sinus rhythm rate of 84 with T wave inversion in aVL, no changes to suggest hyperkalemia.  I discussed patient with nephrologist Dr. Landa at [facility] stating that I think patient can safely be discharged if able to closely follow-up as an outpatient for dialysis. Dr. Landa able to add patient to the schedule for today which was offered to the patient but patient refusing and stating that he will follow-up tomorrow.  Patient offered admission if he does not feel like he will be able to follow-up with dialysis but patient not desiring this.  Patient awake, alert, consentable, understanding risks involved in dialysis noncompliance.  I emphasized the importance of following up with dialysis closely tomorrow.  Patient understanding this.  Discharged with return precautions.    Amount and/or Complexity of Data Reviewed  Labs: ordered.  Radiology: ordered.             Disposition  Final diagnoses:   Noncompliance   Hyperkalemia     Time reflects when diagnosis was documented in both MDM as applicable and the Disposition within this note       Time User Action Codes Description Comment    1/24/2024  2:18 PM Solomon Davis Add [Z91.199] Noncompliance     1/24/2024  2:18 PM Solomon Davis Add [E87.5] Hyperkalemia           ED Disposition       ED Disposition   Discharge    Condition   Stable    Date/Time   Wed Jan 24, 2024 1418    Comment   Naresh Carpio discharge to home/self care.                   Follow-up Information       Follow up With Specialties Details Why Contact Info Additional Information    [facility] Emergency Department Emergency Medicine  If symptoms  [address] [facility] [address]  [phone] [facility] Emergency Department, [address], [place], [address]            Discharge Medication List as of 1/24/2024  2:22 PM        CONTINUE these medications which have NOT CHANGED    Details   acetaminophen (TYLENOL) 325 mg tablet Take 3 tablets (975 mg total) by mouth every 8 (eight) hours, Starting Sun 7/31/2022, No Print      allopurinol (ZYLOPRIM) 100 mg tablet Take 100 mg by mouth daily, Starting Tue 11/2/2021, Until Wed 7/6/2022, Historical Med      atorvastatin (LIPITOR) 80 mg tablet Take 80 mg by mouth daily, Starting Tue 11/2/2021, Until Sat 8/19/2023, Historical Med      docusate sodium (COLACE) 100 mg capsule Take 1 capsule (100 mg total) by mouth 2 (two) times a day, Starting Sun 7/31/2022, Normal      famotidine (PEPCID) 10 mg tablet Take 1 tablet (10 mg total) by mouth daily, Starting Mon 8/1/2022, Normal      insulin glargine (LANTUS) 100 units/mL subcutaneous injection Inject 15 Units under the skin daily at bedtime, Starting Wed 9/6/2023, No Print      insulin lispro (HumaLOG) 100 units/mL injection Inject 5 Units under the skin 3 (three) times a day with meals, Starting Wed 9/6/2023, No Print      lidocaine (LIDODERM) 5 % Apply 1 patch topically over 12 hours daily Remove & Discard patch within 12 hours or as directed by MD Do not start before September 7, 2023., Starting u 9/7/2023, No Print      methocarbamol (ROBAXIN) 500 mg tablet Take 1 tablet (500 mg total) by mouth every 12 (twelve) hours as needed for muscle spasms (back/hip pain and spasm), Starting Sun 7/31/2022, Normal      naloxone (NARCAN) 4 mg/0.1 mL nasal spray Administer 1 spray into a nostril. If no response after 2-3 minutes, give another dose in the other nostril using a new spray., Normal      NIFEdipine ER (ADALAT CC) 30 MG 24 hr tablet Take 30 mg by mouth 2 (two) times a day, Starting Mon 11/15/2021, Historical Med       polyethylene glycol (MIRALAX) 17 g packet Take 17 g by mouth daily Do not start before September 7, 2023., Starting u 9/7/2023, No Print      senna-docusate sodium (SENOKOT S) 8.6-50 mg per tablet Take 2 tablets by mouth 2 (two) times a day, Starting Wed 9/6/2023, No Print      sevelamer (RENAGEL) 800 mg tablet Take 1 tablet (800 mg total) by mouth 3 (three) times a day with meals, Starting Wed 2/2/2022, No Print             No discharge procedures on file.    PDMP Review         Value Time User    PDMP Reviewed  Yes 8/21/2023  8:29 AM Pete Sierra DO            ED Provider  Electronically Signed by             Solomon Davis MD  01/24/24 4106

## 2024-01-24 NOTE — DISCHARGE INSTRUCTIONS
If you have any significant worsening shortness of breath, chest pain, palpitations, if you feel too weak to tolerate dialysis, return to the emergency department.  It is extremely important that you follow-up with dialysis tomorrow and do not miss it for any reason.

## 2024-03-12 ENCOUNTER — APPOINTMENT (EMERGENCY)
Dept: RADIOLOGY | Facility: HOSPITAL | Age: 64
DRG: 602 | End: 2024-03-12
Payer: MEDICARE

## 2024-03-12 ENCOUNTER — APPOINTMENT (INPATIENT)
Dept: DIALYSIS | Facility: HOSPITAL | Age: 64
DRG: 602 | End: 2024-03-12
Payer: MEDICARE

## 2024-03-12 ENCOUNTER — HOSPITAL ENCOUNTER (INPATIENT)
Facility: HOSPITAL | Age: 64
LOS: 10 days | Discharge: HOME | DRG: 602 | End: 2024-03-22
Attending: EMERGENCY MEDICINE | Admitting: STUDENT IN AN ORGANIZED HEALTH CARE EDUCATION/TRAINING PROGRAM
Payer: MEDICARE

## 2024-03-12 DIAGNOSIS — M14.671 CHARCOT'S JOINT OF RIGHT FOOT: ICD-10-CM

## 2024-03-12 DIAGNOSIS — N18.6 ESRD ON HEMODIALYSIS (HCC): ICD-10-CM

## 2024-03-12 DIAGNOSIS — S22.079A T10 VERTEBRAL FRACTURE (HCC): ICD-10-CM

## 2024-03-12 DIAGNOSIS — L03.211 FACIAL CELLULITIS: Primary | ICD-10-CM

## 2024-03-12 DIAGNOSIS — I10 ESSENTIAL HYPERTENSION: ICD-10-CM

## 2024-03-12 DIAGNOSIS — M10.9 GOUT: ICD-10-CM

## 2024-03-12 DIAGNOSIS — E11.42 DIABETIC POLYNEUROPATHY ASSOCIATED WITH TYPE 2 DIABETES MELLITUS (HCC): ICD-10-CM

## 2024-03-12 DIAGNOSIS — Z99.2 ESRD ON HEMODIALYSIS (HCC): ICD-10-CM

## 2024-03-12 DIAGNOSIS — K12.2 UVULITIS: ICD-10-CM

## 2024-03-12 PROBLEM — Z78.9 MEDICAL HISTORY NON-CONTRIBUTORY: Status: ACTIVE | Noted: 2024-03-12

## 2024-03-12 LAB
2HR DELTA HS TROPONIN: 2 NG/L
4HR DELTA HS TROPONIN: 7 NG/L
ALBUMIN SERPL BCP-MCNC: 3.6 G/DL (ref 3.5–5)
ALP SERPL-CCNC: 94 U/L (ref 34–104)
ALT SERPL W P-5'-P-CCNC: 25 U/L (ref 7–52)
ANION GAP SERPL CALCULATED.3IONS-SCNC: 11 MMOL/L (ref 4–13)
AST SERPL W P-5'-P-CCNC: 44 U/L (ref 13–39)
BASOPHILS # BLD AUTO: 0.03 THOUSANDS/ÂΜL (ref 0–0.1)
BASOPHILS NFR BLD AUTO: 0 % (ref 0–1)
BILIRUB SERPL-MCNC: 0.42 MG/DL (ref 0.2–1)
BUN SERPL-MCNC: 74 MG/DL (ref 5–25)
CALCIUM SERPL-MCNC: 8.5 MG/DL (ref 8.4–10.2)
CARDIAC TROPONIN I PNL SERPL HS: 37 NG/L
CARDIAC TROPONIN I PNL SERPL HS: 39 NG/L
CARDIAC TROPONIN I PNL SERPL HS: 44 NG/L
CHLORIDE SERPL-SCNC: 94 MMOL/L (ref 96–108)
CO2 SERPL-SCNC: 25 MMOL/L (ref 21–32)
CREAT SERPL-MCNC: 9.09 MG/DL (ref 0.6–1.3)
EOSINOPHIL # BLD AUTO: 0.24 THOUSAND/ÂΜL (ref 0–0.61)
EOSINOPHIL NFR BLD AUTO: 2 % (ref 0–6)
ERYTHROCYTE [DISTWIDTH] IN BLOOD BY AUTOMATED COUNT: 18.8 % (ref 11.6–15.1)
EST. AVERAGE GLUCOSE BLD GHB EST-MCNC: 108 MG/DL
FLUAV RNA RESP QL NAA+PROBE: NEGATIVE
FLUBV RNA RESP QL NAA+PROBE: NEGATIVE
GFR SERPL CREATININE-BSD FRML MDRD: 5 ML/MIN/1.73SQ M
GLUCOSE SERPL-MCNC: 110 MG/DL (ref 65–140)
GLUCOSE SERPL-MCNC: 175 MG/DL (ref 65–140)
GLUCOSE SERPL-MCNC: 323 MG/DL (ref 65–140)
HBA1C MFR BLD: 5.4 %
HCT VFR BLD AUTO: 33.6 % (ref 36.5–49.3)
HGB BLD-MCNC: 10.8 G/DL (ref 12–17)
IMM GRANULOCYTES # BLD AUTO: 0.07 THOUSAND/UL (ref 0–0.2)
IMM GRANULOCYTES NFR BLD AUTO: 1 % (ref 0–2)
LACTATE SERPL-SCNC: 0.7 MMOL/L (ref 0.5–2)
LYMPHOCYTES # BLD AUTO: 1.15 THOUSANDS/ÂΜL (ref 0.6–4.47)
LYMPHOCYTES NFR BLD AUTO: 9 % (ref 14–44)
MAGNESIUM SERPL-MCNC: 2.2 MG/DL (ref 1.9–2.7)
MCH RBC QN AUTO: 30.2 PG (ref 26.8–34.3)
MCHC RBC AUTO-ENTMCNC: 32.1 G/DL (ref 31.4–37.4)
MCV RBC AUTO: 94 FL (ref 82–98)
MONOCYTES # BLD AUTO: 1.49 THOUSAND/ÂΜL (ref 0.17–1.22)
MONOCYTES NFR BLD AUTO: 11 % (ref 4–12)
NEUTROPHILS # BLD AUTO: 10.55 THOUSANDS/ÂΜL (ref 1.85–7.62)
NEUTS SEG NFR BLD AUTO: 77 % (ref 43–75)
NRBC BLD AUTO-RTO: 0 /100 WBCS
PLATELET # BLD AUTO: 162 THOUSANDS/UL (ref 149–390)
PMV BLD AUTO: 10.5 FL (ref 8.9–12.7)
POTASSIUM SERPL-SCNC: 5.4 MMOL/L (ref 3.5–5.3)
PROT SERPL-MCNC: 7.3 G/DL (ref 6.4–8.4)
RBC # BLD AUTO: 3.58 MILLION/UL (ref 3.88–5.62)
RSV RNA RESP QL NAA+PROBE: NEGATIVE
SARS-COV-2 RNA RESP QL NAA+PROBE: NEGATIVE
SODIUM SERPL-SCNC: 130 MMOL/L (ref 135–147)
WBC # BLD AUTO: 13.53 THOUSAND/UL (ref 4.31–10.16)

## 2024-03-12 PROCEDURE — 0241U HB NFCT DS VIR RESP RNA 4 TRGT: CPT | Performed by: EMERGENCY MEDICINE

## 2024-03-12 PROCEDURE — 87040 BLOOD CULTURE FOR BACTERIA: CPT | Performed by: EMERGENCY MEDICINE

## 2024-03-12 PROCEDURE — 85025 COMPLETE CBC W/AUTO DIFF WBC: CPT | Performed by: EMERGENCY MEDICINE

## 2024-03-12 PROCEDURE — 80053 COMPREHEN METABOLIC PANEL: CPT | Performed by: EMERGENCY MEDICINE

## 2024-03-12 PROCEDURE — 84484 ASSAY OF TROPONIN QUANT: CPT | Performed by: EMERGENCY MEDICINE

## 2024-03-12 PROCEDURE — 83735 ASSAY OF MAGNESIUM: CPT | Performed by: EMERGENCY MEDICINE

## 2024-03-12 PROCEDURE — 36415 COLL VENOUS BLD VENIPUNCTURE: CPT | Performed by: EMERGENCY MEDICINE

## 2024-03-12 PROCEDURE — 93005 ELECTROCARDIOGRAM TRACING: CPT

## 2024-03-12 PROCEDURE — 99222 1ST HOSP IP/OBS MODERATE 55: CPT | Performed by: STUDENT IN AN ORGANIZED HEALTH CARE EDUCATION/TRAINING PROGRAM

## 2024-03-12 PROCEDURE — 99222 1ST HOSP IP/OBS MODERATE 55: CPT | Performed by: INTERNAL MEDICINE

## 2024-03-12 PROCEDURE — 99284 EMERGENCY DEPT VISIT MOD MDM: CPT

## 2024-03-12 PROCEDURE — 96375 TX/PRO/DX INJ NEW DRUG ADDON: CPT

## 2024-03-12 PROCEDURE — 82948 REAGENT STRIP/BLOOD GLUCOSE: CPT

## 2024-03-12 PROCEDURE — 5A1D70Z PERFORMANCE OF URINARY FILTRATION, INTERMITTENT, LESS THAN 6 HOURS PER DAY: ICD-10-PCS | Performed by: INTERNAL MEDICINE

## 2024-03-12 PROCEDURE — 99285 EMERGENCY DEPT VISIT HI MDM: CPT | Performed by: EMERGENCY MEDICINE

## 2024-03-12 PROCEDURE — 83036 HEMOGLOBIN GLYCOSYLATED A1C: CPT | Performed by: STUDENT IN AN ORGANIZED HEALTH CARE EDUCATION/TRAINING PROGRAM

## 2024-03-12 PROCEDURE — 96365 THER/PROPH/DIAG IV INF INIT: CPT

## 2024-03-12 PROCEDURE — 83605 ASSAY OF LACTIC ACID: CPT | Performed by: EMERGENCY MEDICINE

## 2024-03-12 PROCEDURE — 70487 CT MAXILLOFACIAL W/DYE: CPT

## 2024-03-12 PROCEDURE — 96367 TX/PROPH/DG ADDL SEQ IV INF: CPT

## 2024-03-12 RX ORDER — GABAPENTIN 100 MG/1
100 CAPSULE ORAL 4 TIMES DAILY
Status: DISCONTINUED | OUTPATIENT
Start: 2024-03-12 | End: 2024-03-15

## 2024-03-12 RX ORDER — GABAPENTIN 100 MG/1
100 CAPSULE ORAL 3 TIMES DAILY
Status: DISCONTINUED | OUTPATIENT
Start: 2024-03-12 | End: 2024-03-12

## 2024-03-12 RX ORDER — ACETAMINOPHEN 10 MG/ML
1000 INJECTION, SOLUTION INTRAVENOUS EVERY 6 HOURS SCHEDULED
Status: COMPLETED | OUTPATIENT
Start: 2024-03-12 | End: 2024-03-13

## 2024-03-12 RX ORDER — SEVELAMER HYDROCHLORIDE 800 MG/1
800 TABLET, FILM COATED ORAL
Status: DISCONTINUED | OUTPATIENT
Start: 2024-03-12 | End: 2024-03-22 | Stop reason: HOSPADM

## 2024-03-12 RX ORDER — ACETAMINOPHEN 325 MG/1
650 TABLET ORAL EVERY 6 HOURS PRN
Status: DISCONTINUED | OUTPATIENT
Start: 2024-03-13 | End: 2024-03-13

## 2024-03-12 RX ORDER — INSULIN LISPRO 100 [IU]/ML
1-6 INJECTION, SOLUTION INTRAVENOUS; SUBCUTANEOUS
Status: DISCONTINUED | OUTPATIENT
Start: 2024-03-12 | End: 2024-03-20

## 2024-03-12 RX ORDER — ATORVASTATIN CALCIUM 40 MG/1
80 TABLET, FILM COATED ORAL
Status: DISCONTINUED | OUTPATIENT
Start: 2024-03-12 | End: 2024-03-22 | Stop reason: HOSPADM

## 2024-03-12 RX ORDER — FAMOTIDINE 20 MG/1
10 TABLET, FILM COATED ORAL DAILY
Status: DISCONTINUED | OUTPATIENT
Start: 2024-03-12 | End: 2024-03-22 | Stop reason: HOSPADM

## 2024-03-12 RX ORDER — CEFTRIAXONE 1 G/50ML
1000 INJECTION, SOLUTION INTRAVENOUS ONCE
Status: COMPLETED | OUTPATIENT
Start: 2024-03-12 | End: 2024-03-12

## 2024-03-12 RX ORDER — VANCOMYCIN HYDROCHLORIDE 1 G/200ML
10 INJECTION, SOLUTION INTRAVENOUS
Status: DISCONTINUED | OUTPATIENT
Start: 2024-03-14 | End: 2024-03-16

## 2024-03-12 RX ORDER — INSULIN GLARGINE 100 [IU]/ML
15 INJECTION, SOLUTION SUBCUTANEOUS
Status: DISCONTINUED | OUTPATIENT
Start: 2024-03-12 | End: 2024-03-19

## 2024-03-12 RX ORDER — NIFEDIPINE 30 MG/1
30 TABLET, EXTENDED RELEASE ORAL
Status: DISCONTINUED | OUTPATIENT
Start: 2024-03-12 | End: 2024-03-22 | Stop reason: HOSPADM

## 2024-03-12 RX ORDER — HYDROMORPHONE HCL IN WATER/PF 6 MG/30 ML
0.2 PATIENT CONTROLLED ANALGESIA SYRINGE INTRAVENOUS EVERY 4 HOURS PRN
Status: COMPLETED | OUTPATIENT
Start: 2024-03-12 | End: 2024-03-13

## 2024-03-12 RX ORDER — NIFEDIPINE 30 MG/1
30 TABLET, EXTENDED RELEASE ORAL
Status: DISCONTINUED | OUTPATIENT
Start: 2024-03-13 | End: 2024-03-22 | Stop reason: HOSPADM

## 2024-03-12 RX ORDER — HEPARIN SODIUM 5000 [USP'U]/ML
5000 INJECTION, SOLUTION INTRAVENOUS; SUBCUTANEOUS EVERY 8 HOURS SCHEDULED
Status: DISCONTINUED | OUTPATIENT
Start: 2024-03-13 | End: 2024-03-22 | Stop reason: HOSPADM

## 2024-03-12 RX ORDER — ACETAMINOPHEN 10 MG/ML
1000 INJECTION, SOLUTION INTRAVENOUS ONCE
Status: COMPLETED | OUTPATIENT
Start: 2024-03-12 | End: 2024-03-12

## 2024-03-12 RX ORDER — ONDANSETRON 2 MG/ML
4 INJECTION INTRAMUSCULAR; INTRAVENOUS EVERY 6 HOURS PRN
Status: DISCONTINUED | OUTPATIENT
Start: 2024-03-12 | End: 2024-03-22 | Stop reason: HOSPADM

## 2024-03-12 RX ORDER — NIFEDIPINE 30 MG/1
30 TABLET, EXTENDED RELEASE ORAL 2 TIMES DAILY
Status: DISCONTINUED | OUTPATIENT
Start: 2024-03-12 | End: 2024-03-12

## 2024-03-12 RX ORDER — ACETAMINOPHEN 325 MG/1
650 TABLET ORAL EVERY 6 HOURS PRN
Status: DISCONTINUED | OUTPATIENT
Start: 2024-03-12 | End: 2024-03-12

## 2024-03-12 RX ORDER — CEFEPIME HYDROCHLORIDE 1 G/50ML
1000 INJECTION, SOLUTION INTRAVENOUS EVERY 24 HOURS
Status: DISCONTINUED | OUTPATIENT
Start: 2024-03-12 | End: 2024-03-17

## 2024-03-12 RX ORDER — CEFEPIME HYDROCHLORIDE 2 G/50ML
2000 INJECTION, SOLUTION INTRAVENOUS EVERY 8 HOURS
Status: DISCONTINUED | OUTPATIENT
Start: 2024-03-12 | End: 2024-03-12

## 2024-03-12 RX ORDER — INSULIN LISPRO 100 [IU]/ML
1-6 INJECTION, SOLUTION INTRAVENOUS; SUBCUTANEOUS
Status: DISCONTINUED | OUTPATIENT
Start: 2024-03-12 | End: 2024-03-21

## 2024-03-12 RX ORDER — DOCUSATE SODIUM 100 MG/1
100 CAPSULE, LIQUID FILLED ORAL 2 TIMES DAILY
Status: DISCONTINUED | OUTPATIENT
Start: 2024-03-12 | End: 2024-03-22 | Stop reason: HOSPADM

## 2024-03-12 RX ORDER — DEXAMETHASONE SODIUM PHOSPHATE 4 MG/ML
10 INJECTION, SOLUTION INTRA-ARTICULAR; INTRALESIONAL; INTRAMUSCULAR; INTRAVENOUS; SOFT TISSUE ONCE
Status: COMPLETED | OUTPATIENT
Start: 2024-03-12 | End: 2024-03-12

## 2024-03-12 RX ORDER — ENOXAPARIN SODIUM 100 MG/ML
40 INJECTION SUBCUTANEOUS DAILY
Status: DISCONTINUED | OUTPATIENT
Start: 2024-03-12 | End: 2024-03-12

## 2024-03-12 RX ORDER — POLYETHYLENE GLYCOL 3350 17 G/17G
17 POWDER, FOR SOLUTION ORAL DAILY
Status: DISCONTINUED | OUTPATIENT
Start: 2024-03-12 | End: 2024-03-22 | Stop reason: HOSPADM

## 2024-03-12 RX ORDER — HEPARIN SODIUM 5000 [USP'U]/ML
5000 INJECTION, SOLUTION INTRAVENOUS; SUBCUTANEOUS EVERY 8 HOURS SCHEDULED
Status: DISCONTINUED | OUTPATIENT
Start: 2024-03-12 | End: 2024-03-12

## 2024-03-12 RX ORDER — CEFAZOLIN SODIUM 2 G/50ML
2000 SOLUTION INTRAVENOUS EVERY 8 HOURS
Status: DISCONTINUED | OUTPATIENT
Start: 2024-03-12 | End: 2024-03-12

## 2024-03-12 RX ADMIN — CEFEPIME HYDROCHLORIDE 1000 MG: 1 INJECTION, SOLUTION INTRAVENOUS at 17:56

## 2024-03-12 RX ADMIN — IOHEXOL 85 ML: 350 INJECTION, SOLUTION INTRAVENOUS at 09:53

## 2024-03-12 RX ADMIN — INSULIN LISPRO 5 UNITS: 100 INJECTION, SOLUTION INTRAVENOUS; SUBCUTANEOUS at 21:38

## 2024-03-12 RX ADMIN — GABAPENTIN 100 MG: 100 CAPSULE ORAL at 21:38

## 2024-03-12 RX ADMIN — INSULIN LISPRO 1 UNITS: 100 INJECTION, SOLUTION INTRAVENOUS; SUBCUTANEOUS at 17:52

## 2024-03-12 RX ADMIN — ACETAMINOPHEN 1000 MG: 10 INJECTION INTRAVENOUS at 09:53

## 2024-03-12 RX ADMIN — HYDROMORPHONE HYDROCHLORIDE 0.2 MG: 0.2 INJECTION, SOLUTION INTRAMUSCULAR; INTRAVENOUS; SUBCUTANEOUS at 19:58

## 2024-03-12 RX ADMIN — VANCOMYCIN HYDROCHLORIDE 2000 MG: 1 INJECTION, POWDER, LYOPHILIZED, FOR SOLUTION INTRAVENOUS at 11:23

## 2024-03-12 RX ADMIN — FAMOTIDINE 10 MG: 20 TABLET, FILM COATED ORAL at 17:48

## 2024-03-12 RX ADMIN — DEXAMETHASONE SODIUM PHOSPHATE 10 MG: 4 INJECTION INTRA-ARTICULAR; INTRALESIONAL; INTRAMUSCULAR; INTRAVENOUS; SOFT TISSUE at 09:53

## 2024-03-12 RX ADMIN — VANCOMYCIN HYDROCHLORIDE 2000 MG: 1 INJECTION, POWDER, LYOPHILIZED, FOR SOLUTION INTRAVENOUS at 19:19

## 2024-03-12 RX ADMIN — ACETAMINOPHEN 1000 MG: 10 INJECTION INTRAVENOUS at 19:58

## 2024-03-12 RX ADMIN — DOCUSATE SODIUM 100 MG: 100 CAPSULE, LIQUID FILLED ORAL at 17:48

## 2024-03-12 RX ADMIN — INSULIN GLARGINE 15 UNITS: 100 INJECTION, SOLUTION SUBCUTANEOUS at 23:01

## 2024-03-12 RX ADMIN — ATORVASTATIN CALCIUM 80 MG: 40 TABLET, FILM COATED ORAL at 17:48

## 2024-03-12 RX ADMIN — CEFTRIAXONE 1000 MG: 1 INJECTION, SOLUTION INTRAVENOUS at 10:10

## 2024-03-12 RX ADMIN — SEVELAMER HYDROCHLORIDE 800 MG: 800 TABLET, FILM COATED ORAL at 17:48

## 2024-03-12 RX ADMIN — POLYETHYLENE GLYCOL 3350 17 G: 17 POWDER, FOR SOLUTION ORAL at 17:48

## 2024-03-12 RX ADMIN — NIFEDIPINE 30 MG: 30 TABLET, EXTENDED RELEASE ORAL at 17:48

## 2024-03-12 RX ADMIN — HEPARIN SODIUM 5000 UNITS: 5000 INJECTION INTRAVENOUS; SUBCUTANEOUS at 17:56

## 2024-03-12 RX ADMIN — SEVELAMER HYDROCHLORIDE 800 MG: 800 TABLET, FILM COATED ORAL at 13:27

## 2024-03-12 NOTE — ASSESSMENT & PLAN NOTE
Lab Results   Component Value Date    EGFR 5 03/12/2024    EGFR 3 01/24/2024    EGFR 10 09/06/2023    CREATININE 9.09 (H) 03/12/2024    CREATININE 12.48 (H) 01/24/2024    CREATININE 5.13 (H) 09/06/2023     Continue home sevelamer  Avoid nephrotoxic's  Nephro consulted

## 2024-03-12 NOTE — ASSESSMENT & PLAN NOTE
Lab Results   Component Value Date    HGBA1C 6.6 (H) 07/07/2022       Repeat [id number]  Continue home Lantus 15 units subcu  SSI in place  Carb controlled diet

## 2024-03-12 NOTE — ASSESSMENT & PLAN NOTE
Patient presents with acute onset right facial pain, with associated redness, swelling    CT face:Extensive superficial and deep soft tissue cellulitis involving multiple spaces in the right greater than left face and neck extending inferiorly to the level of thyroid cartilage as described. The caudal portion of the inflammatory change is not completely imaged. Visualized airways are patent. No abscess. 2.  Artifact from prior dental fillings prevents reliable evaluation for dental caries. There is no periapical disease. 3.  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  Hx: Pseudomonas/MRSA-pending  ED: Vancomycin/Rocephin  ABX: Vancomycin/cefepime  Wcx: follow cx, adjust antibiotics as needed  Tylenol scheduled with Dilaudid as needed for breakthrough, home gabapentin  Consider ID if worsening

## 2024-03-12 NOTE — CONSULTS
Consultation - Nephrology   Naresh Carpio 63 y.o. male MRN: [id number]  Unit/Bed#: [address] [place] [address] Encounter: [id number]    ASSESSMENT:  1. ESRD on HD (TTS @ [facility]): HD today   - EDW 108kg  2. Access: CVC  3. Hypertension: BP slightly above goal, monitor for now on home medications   - nifedipine 30mg BID on non dialysis days & only at night on dialysis days  4. Anemia: hgb below goal   - mircera 150mg q2wk outpatient  5. Secondary Hyperparathyroidism of renal origin: renal diet   - binders with meals (he takes velphoro outpatient)  6. Hyperkalemia: treat with dialysis    - low K diet  7. Hyponatremia: fluid removal with HD as tolerated  8. Facial Cellulitis: per primary team, on antibiotics   - recommend vancomycin to be given after dialysis     PLAN:  HD today  Orders written  Discussed with dialysis nurse    HISTORY OF PRESENT ILLNESS:  Requesting Physician: Angie Bernard MD  Reason for Consult: ESRD on HD    Naresh Carpio is a 63 y.o. year old male who was admitted to [facility] after presenting with facial swelling with pus and blood from pimple like lesions. A renal consultation is requested today for assistance in the management of ESRD. Naresh Carpio is a known ESRD patient who undergoes maintenance hemodialysis at [facility] on TTS.  He missed his dialysis treatment today.  He is feeling well and denies SOB, LE edema, abdominal pain.  He has pain in his face but it is improving.      PAST MEDICAL HISTORY:  Past Medical History:   Diagnosis Date    CKD     Diabetes mellitus (HCC)     Hyperlipidemia     Hypertension     Left toe amputee (HCC)     TIA (transient ischemic attack)        PAST SURGICAL HISTORY:  Past Surgical History:   Procedure Laterality Date    AMPUTATION Left     left foot resection 10 years ago dr tran    IR LOWER EXTREMITY ANGIOGRAM  08/29/2023    IR TEMPORARY DIALYSIS CATHETER PLACEMENT  08/25/2023    IR TUNNELED CENTRAL LINE REMOVAL   08/23/2023    IR TUNNELED DIALYSIS CATHETER PLACEMENT  01/27/2022    IR TUNNELED DIALYSIS CATHETER PLACEMENT  08/30/2023    MT AMPUTATION METATARSAL W/TOE SINGLE Right 8/31/2023    Procedure: RIGHT PARTIAL 1ST RAY RESECTION WITH  WOUND VAC APPLICATION;  Surgeon: Won Parmar DPM;  Location: [facility];  Service: Podiatry       ALLERGIES:  No Known Allergies    SOCIAL HISTORY:  Social History     Substance and Sexual Activity   Alcohol Use Not Currently    Alcohol/week: 0.0 standard drinks of alcohol    Comment: 0     Social History     Substance and Sexual Activity   Drug Use Never     Social History     Tobacco Use   Smoking Status Never    Passive exposure: Never   Smokeless Tobacco Never       FAMILY HISTORY:  History reviewed. No pertinent family history.    MEDICATIONS:    Current Facility-Administered Medications:     acetaminophen (TYLENOL) tablet 650 mg, 650 mg, Oral, Q6H PRN, Angie Bernard MD    atorvastatin (LIPITOR) tablet 80 mg, 80 mg, Oral, Daily With Dinner, Angie Bernard MD    cefepime (MAXIPIME) IVPB (premix in dextrose) 1,000 mg 50 mL, 1,000 mg, Intravenous, Q24H, Angie Bernard MD    docusate sodium (COLACE) capsule 100 mg, 100 mg, Oral, BID, Angie Bernard MD    famotidine (PEPCID) tablet 10 mg, 10 mg, Oral, Daily, Angie Bernard MD    heparin (porcine) subcutaneous injection 5,000 Units, 5,000 Units, Subcutaneous, Q8H KEMAL, Angie Bernard MD    insulin glargine (LANTUS) subcutaneous injection 15 Units 0.15 mL, 15 Units, Subcutaneous, HS, Angie Bernard MD    insulin lispro (HumALOG/ADMELOG) 100 units/mL subcutaneous injection 1-6 Units, 1-6 Units, Subcutaneous, TID AC **AND** Fingerstick Glucose (POCT), , , TID AC, Angie Bernard MD    NIFEdipine (PROCARDIA XL) 24 hr tablet 30 mg, 30 mg, Oral, BID, Angie Bernard MD    ondansetron (ZOFRAN) injection 4 mg, 4 mg, Intravenous, Q6H PRN, Angie Bernard MD    polyethylene glycol (MIRALAX) packet 17 g, 17 g, Oral, Daily, Angie Bernard  MD    sevelamer (RENAGEL) tablet 800 mg, 800 mg, Oral, TID With Meals, Angie Bernard MD    vancomycin (VANCOCIN) 2,000 mg in sodium chloride 0.9 % 500 mL IVPB, 2,000 mg, Intravenous, Once, Hailey Mujica DO, Last Rate: 250 mL/hr at 03/12/24 1123, 2,000 mg at 03/12/24 1123    REVIEW OF SYSTEMS:  A complete 10 point review of systems was performed and found to be negative unless otherwise noted below or in the HPI.  General: No fevers, chills.   Cardiovascular: No chest pain, shortness of breath, palpitations, leg edema.  Respiratory: No cough, sputum production, shortness of breath.  Gastrointestinal: No nausea, vomiting, abdominal pain, diarrhea.  Genitourinary: No hematuria.    PHYSICAL EXAM:  Current Weight:    First Weight:    Vitals:    03/12/24 1015 03/12/24 1045 03/12/24 1115 03/12/24 1145   BP: 158/69 154/70 156/72 165/75   BP Location: Left arm Left arm Left arm Left arm   Pulse: 78 76 76 72   Resp: 18 18 18 18   Temp:       TempSrc:       SpO2: 94% 94% 93% 93%       Intake/Output Summary (Last 24 hours) at 3/12/2024 1314  Last data filed at 3/12/2024 1054  Gross per 24 hour   Intake 150 ml   Output --   Net 150 ml     General: NAD  Skin: right facial erythema and swelling   Eyes: right eye swollen shut  ENMT: mm moist  Neck: no masses  Respiratory: CTAB  CVS: RRR  Extremities: trace bilateral LE edema  GI: soft nt nd  Neuro: alert awake  Psych:  mood and affect appropriate     Lab Results:   Results from last 7 days   Lab Units 03/12/24  0939   WBC Thousand/uL 13.53*   HEMOGLOBIN g/dL 10.8*   HEMATOCRIT % 33.6*   PLATELETS Thousands/uL 162   POTASSIUM mmol/L 5.4*   CHLORIDE mmol/L 94*   CO2 mmol/L 25   BUN mg/dL 74*   CREATININE mg/dL 9.09*   CALCIUM mg/dL 8.5   MAGNESIUM mg/dL 2.2   ALK PHOS U/L 94   ALT U/L 25   AST U/L 44*     Lab Results   Component Value Date    CALCIUM 8.5 03/12/2024    PHOS 4.1 09/06/2023     I have personally reviewed the blood work as stated above and in my note.  I have  personally reviewed ER note.

## 2024-03-12 NOTE — ED PROVIDER NOTES
"History  Chief Complaint   Patient presents with    Facial Swelling     Patient reports a few \"pimples\" on face that he popped which released \"blood and clear fluid\" but no pus. Patient reports right sided facial swelling increased over 1 day and feels like the swelling to migrating into the left side of the face. Denies any shortness of breath, throat swelling.     63-year-old male with past history of ESRD on Tuesday/Thursday/Saturday dialysis, gout, hypertension, TIA, hyperlipidemia, obesity, presents to the ED for evaluation of swelling to right side of face since yesterday.  Patient noted some pimples to his face a few days ago.  Patient tried to pop the pimples.  Patient started having facial swelling yesterday.  Patient woke up this morning and could barely open his eyes.  Subsequently patient came to the ED for further evaluation.  Patient denies any fevers or chills at home.  Patient denies any throat closing sensation or mouth swelling.  He missed his dialysis today.      History provided by:  Patient      Prior to Admission Medications   Prescriptions Last Dose Informant Patient Reported? Taking?   NIFEdipine ER (ADALAT CC) 30 MG 24 hr tablet   Yes No   Sig: Take 30 mg by mouth 2 (two) times a day   acetaminophen (TYLENOL) 325 mg tablet   No No   Sig: Take 3 tablets (975 mg total) by mouth every 8 (eight) hours   allopurinol (ZYLOPRIM) 100 mg tablet   Yes No   Sig: Take 100 mg by mouth daily   atorvastatin (LIPITOR) 80 mg tablet   Yes No   Sig: Take 80 mg by mouth daily   docusate sodium (COLACE) 100 mg capsule   No No   Sig: Take 1 capsule (100 mg total) by mouth 2 (two) times a day   famotidine (PEPCID) 10 mg tablet   No No   Sig: Take 1 tablet (10 mg total) by mouth daily   insulin glargine (LANTUS) 100 units/mL subcutaneous injection   No No   Sig: Inject 15 Units under the skin daily at bedtime   insulin lispro (HumaLOG) 100 units/mL injection   No No   Sig: Inject 5 Units under the skin 3 (three) " times a day with meals   lidocaine (LIDODERM) 5 %   No No   Sig: Apply 1 patch topically over 12 hours daily Remove & Discard patch within 12 hours or as directed by MD Do not start before September 7, 2023.   methocarbamol (ROBAXIN) 500 mg tablet   No No   Sig: Take 1 tablet (500 mg total) by mouth every 12 (twelve) hours as needed for muscle spasms (back/hip pain and spasm)   naloxone (NARCAN) 4 mg/0.1 mL nasal spray   No No   Sig: Administer 1 spray into a nostril. If no response after 2-3 minutes, give another dose in the other nostril using a new spray.   polyethylene glycol (MIRALAX) 17 g packet   No No   Sig: Take 17 g by mouth daily Do not start before September 7, 2023.   senna-docusate sodium (SENOKOT S) 8.6-50 mg per tablet   No No   Sig: Take 2 tablets by mouth 2 (two) times a day   sevelamer (RENAGEL) 800 mg tablet   No No   Sig: Take 1 tablet (800 mg total) by mouth 3 (three) times a day with meals      Facility-Administered Medications: None       Past Medical History:   Diagnosis Date    CKD     Diabetes mellitus (HCC)     Hyperlipidemia     Hypertension     Left toe amputee (HCC)     TIA (transient ischemic attack)        Past Surgical History:   Procedure Laterality Date    AMPUTATION Left     left foot resection 10 years ago dr tran    IR LOWER EXTREMITY ANGIOGRAM  08/29/2023    IR TEMPORARY DIALYSIS CATHETER PLACEMENT  08/25/2023    IR TUNNELED CENTRAL LINE REMOVAL  08/23/2023    IR TUNNELED DIALYSIS CATHETER PLACEMENT  01/27/2022    IR TUNNELED DIALYSIS CATHETER PLACEMENT  08/30/2023    TX AMPUTATION METATARSAL W/TOE SINGLE Right 8/31/2023    Procedure: RIGHT PARTIAL 1ST RAY RESECTION WITH  WOUND VAC APPLICATION;  Surgeon: Won Parmar DPM;  Location: [facility];  Service: Podiatry       History reviewed. No pertinent family history.  I have reviewed and agree with the history as documented.    E-Cigarette/Vaping    E-Cigarette Use Never User      E-Cigarette/Vaping Substances    Nicotine  No     THC No     CBD No     Flavoring No     Other No     Unknown No      Social History     Tobacco Use    Smoking status: Never     Passive exposure: Never    Smokeless tobacco: Never   Vaping Use    Vaping status: Never Used   Substance Use Topics    Alcohol use: Not Currently     Alcohol/week: 0.0 standard drinks of alcohol     Comment: 0    Drug use: Never       Review of Systems   Constitutional:  Negative for chills and fever.   HENT:  Negative for ear pain and sore throat.    Eyes:  Negative for pain and visual disturbance.   Respiratory:  Negative for cough and shortness of breath.    Cardiovascular:  Negative for chest pain and palpitations.   Gastrointestinal:  Negative for abdominal pain and vomiting.   Genitourinary:  Negative for dysuria and hematuria.   Musculoskeletal:  Negative for arthralgias and back pain.   Skin:  Positive for rash. Negative for color change.   Neurological:  Negative for seizures and syncope.   All other systems reviewed and are negative.      Physical Exam  Physical Exam  Vitals and nursing note reviewed.   Constitutional:       General: He is not in acute distress.     Appearance: He is well-developed.   HENT:      Head: Normocephalic and atraumatic.      Mouth/Throat:      Comments: Mild uvular swelling noted without any signs of airway compromise.  Eyes:      Extraocular Movements: Extraocular movements intact.      Conjunctiva/sclera: Conjunctivae normal.      Pupils: Pupils are equal, round, and reactive to light.      Comments: Periorbital swelling noted on the right side without any conjunctival injection.  Extraocular muscles are intact.  No photophobia noted on exam.   Cardiovascular:      Rate and Rhythm: Normal rate and regular rhythm.      Heart sounds: No murmur heard.  Pulmonary:      Effort: Pulmonary effort is normal. No respiratory distress.      Breath sounds: Normal breath sounds.   Abdominal:      Palpations: Abdomen is soft.      Tenderness: There is no  abdominal tenderness.   Musculoskeletal:         General: No swelling.      Cervical back: Neck supple.   Skin:     General: Skin is warm and dry.      Capillary Refill: Capillary refill takes less than 2 seconds.      Comments: Significant swelling noted to right side of the face including right periorbital region.  Please see picture below for clarification.   Neurological:      Mental Status: He is alert.   Psychiatric:         Mood and Affect: Mood normal.           Vital Signs  ED Triage Vitals [03/12/24 0924]   Temperature Pulse Respirations Blood Pressure SpO2   99.8 °F (37.7 °C) 79 18 (!) 176/73 99 %      Temp Source Heart Rate Source Patient Position - Orthostatic VS BP Location FiO2 (%)   Oral Monitor Lying Left arm --      Pain Score       8           Vitals:    03/12/24 0924 03/12/24 1015 03/12/24 1045   BP: (!) 176/73 158/69 154/70   Pulse: 79 78 76   Patient Position - Orthostatic VS: Lying Lying Lying         Visual Acuity      ED Medications  Medications   vancomycin (VANCOCIN) 2,000 mg in sodium chloride 0.9 % 500 mL IVPB (has no administration in time range)   dexamethasone (DECADRON) injection 10 mg (10 mg Intravenous Given 3/12/24 0953)   acetaminophen (Ofirmev) injection 1,000 mg (0 mg Intravenous Stopped 3/12/24 1009)   cefTRIAXone (ROCEPHIN) IVPB (premix in dextrose) 1,000 mg 50 mL (0 mg Intravenous Stopped 3/12/24 1054)   iohexol (OMNIPAQUE) 350 MG/ML injection (MULTI-DOSE) 85 mL (85 mL Intravenous Given 3/12/24 0953)       Diagnostic Studies  Results Reviewed       Procedure Component Value Units Date/Time    HS Troponin I 2hr [[id number]]     Lab Status: No result Specimen: Blood     HS Troponin 0hr (reflex protocol) [[id number]]  (Normal) Collected: 03/12/24 0939    Lab Status: Final result Specimen: Blood from Arm, Right Updated: 03/12/24 1100     hs TnI 0hr 37 ng/L     Lactic acid, plasma (w/reflex if result > 2.0) [[id number]]  (Normal) Collected: 03/12/24 0939    Lab Status: Final result  Specimen: Blood from Arm, Right Updated: 03/12/24 1041     LACTIC ACID 0.7 mmol/L     Narrative:      Result may be elevated if tourniquet was used during collection.    FLU/RSV/COVID - if FLU/RSV clinically relevant [[id number]]  (Normal) Collected: 03/12/24 0939    Lab Status: Final result Specimen: Nares from Nose Updated: 03/12/24 1030     SARS-CoV-2 Negative     INFLUENZA A PCR Negative     INFLUENZA B PCR Negative     RSV PCR Negative    Narrative:      FOR PEDIATRIC PATIENTS - copy/paste COVID Guidelines URL to browser: [email or web address]    SARS-CoV-2 assay is a Nucleic Acid Amplification assay intended for the  qualitative detection of nucleic acid from SARS-CoV-2 in nasopharyngeal  swabs. Results are for the presumptive identification of SARS-CoV-2 RNA.    Positive results are indicative of infection with SARS-CoV-2, the virus  causing COVID-19, but do not rule out bacterial infection or co-infection  with other viruses. Laboratories within the [place] and its  territories are required to report all positive results to the appropriate  public health authorities. Negative results do not preclude SARS-CoV-2  infection and should not be used as the sole basis for treatment or other  patient management decisions. Negative results must be combined with  clinical observations, patient history, and epidemiological information.  This test has not been FDA cleared or approved.    This test has been authorized by FDA under an Emergency Use Authorization  (EUA). This test is only authorized for the duration of time the  declaration that circumstances exist justifying the authorization of the  emergency use of an in vitro diagnostic tests for detection of SARS-CoV-2  virus and/or diagnosis of COVID-19 infection under section 564(b)(1) of  the Act, 21 U.S.C. 360bbb-3(b)(1), unless the authorization is terminated  or revoked sooner. The test has been validated  but independent review by FDA  and CLIA is pending.    Test performed using [employer] GeneXpert: This RT-PCR assay targets N2,  a region unique to SARS-CoV-2. A conserved region in the E-gene was chosen  for pan-Sarbecovirus detection which includes SARS-CoV-2.    According to CMS-2020-01-R, this platform meets the definition of high-throughput technology.    Comprehensive metabolic panel [[id number]]  (Abnormal) Collected: 03/12/24 0939    Lab Status: Final result Specimen: Blood from Arm, Right Updated: 03/12/24 1021     Sodium 130 mmol/L      Potassium 5.4 mmol/L      Chloride 94 mmol/L      CO2 25 mmol/L      ANION GAP 11 mmol/L      BUN 74 mg/dL      Creatinine 9.09 mg/dL      Glucose 110 mg/dL      Calcium 8.5 mg/dL      AST 44 U/L      ALT 25 U/L      Alkaline Phosphatase 94 U/L      Total Protein 7.3 g/dL      Albumin 3.6 g/dL      Total Bilirubin 0.42 mg/dL      eGFR 5 ml/min/1.73sq m     Narrative:      National Kidney Disease Foundation guidelines for Chronic Kidney Disease (CKD):     Stage 1 with normal or high GFR (GFR > 90 mL/min/1.73 square meters)    Stage 2 Mild CKD (GFR = 60-89 mL/min/1.73 square meters)    Stage 3A Moderate CKD (GFR = 45-59 mL/min/1.73 square meters)    Stage 3B Moderate CKD (GFR = 30-44 mL/min/1.73 square meters)    Stage 4 Severe CKD (GFR = 15-29 mL/min/1.73 square meters)    Stage 5 End Stage CKD (GFR <15 mL/min/1.73 square meters)  Note: GFR calculation is accurate only with a steady state creatinine    Magnesium [[id number]]  (Normal) Collected: 03/12/24 0939    Lab Status: Final result Specimen: Blood from Arm, Right Updated: 03/12/24 1021     Magnesium 2.2 mg/dL     Blood culture #1 [[id number]] Collected: 03/12/24 1009    Lab Status: In process Specimen: Blood from Hand, Left Updated: 03/12/24 1017    Blood culture #2 [[id number]] Collected: 03/12/24 1009    Lab Status: In process Specimen: Blood from Hand, Right Updated: 03/12/24 1017    CBC and differential [[id number]]   (Abnormal) Collected: 03/12/24 0939    Lab Status: Final result Specimen: Blood from Arm, Right Updated: 03/12/24 0945     WBC 13.53 Thousand/uL      RBC 3.58 Million/uL      Hemoglobin 10.8 g/dL      Hematocrit 33.6 %      MCV 94 fL      MCH 30.2 pg      MCHC 32.1 g/dL      RDW 18.8 %      MPV 10.5 fL      Platelets 162 Thousands/uL      nRBC 0 /100 WBCs      Neutrophils Relative 77 %      Immature Grans % 1 %      Lymphocytes Relative 9 %      Monocytes Relative 11 %      Eosinophils Relative 2 %      Basophils Relative 0 %      Neutrophils Absolute 10.55 Thousands/µL      Absolute Immature Grans 0.07 Thousand/uL      Absolute Lymphocytes 1.15 Thousands/µL      Absolute Monocytes 1.49 Thousand/µL      Eosinophils Absolute 0.24 Thousand/µL      Basophils Absolute 0.03 Thousands/µL     UA w Reflex to Microscopic w Reflex to Culture [[id number]]     Lab Status: No result Specimen: Urine                    CT facial bones w contrast   Final Result by Santosh Shah MD (03/12 1033)      1.  Extensive superficial and deep soft tissue cellulitis involving multiple spaces in the right greater than left face and neck extending inferiorly to the level of thyroid cartilage as described. The caudal portion of the inflammatory change is not    completely imaged. Visualized airways are patent. No abscess.   2.  Artifact from prior dental fillings prevents reliable evaluation for dental caries. There is no periapical disease.   3.  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         Workstation performed: [id number]                    Procedures  ECG 12 Lead Documentation Only    Date/Time: 3/12/2024 10:33 AM    Performed by: Hailey Mujica DO  Authorized by: Hailey Mujica DO    Indications / Diagnosis:  Infection  ECG reviewed by me, the ED Provider: yes    Patient location:  ED  Previous ECG:     Previous ECG:  Compared to current    Similarity:  Changes noted    Comparison  to cardiac monitor: Yes    Interpretation:     Interpretation: normal    Comments:      Sinus rhythm, rate 78, normal axis, normal intervals, no acute ST/T wave normalities noted, there is unremarkable EKG.           ED Course                               SBIRT 22yo+      Flowsheet Row Most Recent Value   Initial Alcohol Screen: US AUDIT-C     1. How often do you have a drink containing alcohol? 0 Filed at: 03/12/2024 0925   2. How many drinks containing alcohol do you have on a typical day you are drinking?  0 Filed at: 03/12/2024 0925   3a. Male UNDER 65: How often do you have five or more drinks on one occasion? 0 Filed at: 03/12/2024 0925   Audit-C Score 0 Filed at: 03/12/2024 0925   NICOL[place]S: How many times in the past year have you...    Used an illegal drug or used a prescription medication for non-medical reasons? Never Filed at: 03/12/2024 0925                      Medical Decision Making  Obtain septic workup, CT facial bones  Give IV fluids, empiric antibiotic, Decadron for facial swelling and continue to monitor patient for any worsening symptoms    Patient's blood work showed leukocytosis.  Potassium was mildly elevated however there was no EKG changes noted.  CT scan showed  extensive superficial and deep soft tissue cellulitis involving multiple spaces in the right greater than left face and neck extending inferiorly to the level of thyroid cartilage.  IV antibiotics started and patient is admitted for further evaluation and management.  Patient agrees with admission plans.    Amount and/or Complexity of Data Reviewed  External Data Reviewed: labs and ECG.  Labs: ordered. Decision-making details documented in ED Course.  Radiology: ordered. Decision-making details documented in ED Course.  ECG/medicine tests: ordered and independent interpretation performed. Decision-making details documented in ED Course.    Risk  Prescription drug management.             Disposition  Final diagnoses:   Facial  cellulitis   Uvulitis   ESRD on hemodialysis (HCC)     Time reflects when diagnosis was documented in both MDM as applicable and the Disposition within this note       Time User Action Codes Description Comment    3/12/2024 11:00 AM Hailey Mujica [L03.211] Facial cellulitis     3/12/2024 11:00 AM Hailey Mujica [K12.2] Uvulitis     3/12/2024 11:00 AM Hailey Mujica Add [N18.6,  Z99.2] ESRD on hemodialysis (HCC)           ED Disposition       ED Disposition   Admit    Condition   Stable    Date/Time   Tue Mar 12, 2024 1100    Comment   Case was discussed with Dr. Bernard and the patient's admission status was agreed to be Admission Status: inpatient status to the service of Dr. Bernard.               Follow-up Information    None         Patient's Medications   Discharge Prescriptions    No medications on file       No discharge procedures on file.    PDMP Review         Value Time User    PDMP Reviewed  Yes 8/21/2023  8:29 AM Pete Sierra DO            ED Provider  Electronically Signed by             Hailey Mujica DO  03/12/24 1112

## 2024-03-12 NOTE — PROCEDURES
HEMODIALYSIS PROCEDURE NOTE  The patient was seen and examined on hemodialysis.  Tolerating well and resting comfortably.  Time: 4 hours  Sodium: 138 Blood flow: 400   Dialyzer: F160 Potassium: 2k Dialysate flow: 500   Access: cvc, lines reversed Bicarbonate: 35 Ultrafiltration goal: 4L   Medications on HD: vancomycin

## 2024-03-12 NOTE — PROGRESS NOTES
Naresh Carpio is a 63 y.o. male who is currently ordered Vancomycin IV with management by the Pharmacy Consult service.  Relevant clinical data and objective / subjective history reviewed.  Vancomycin Assessment:  Indication and Goal AUC/Trough: Soft tissue (goal -600, trough >10)  Clinical Status:  new  Micro:   pending  Renal Function:  SCr: 9.09 mg/dL  CrCl: 10.6 mL/min  Renal replacement: HD  Days of Therapy: 1  Current Dose: 2000mg IV once (LD)  Vancomycin Plan:  New Dosinmg IV pst HD days (T--SAT)  Estimated AUC: N/A  Estimated Trough: N/A  Next Level: 24 @ 0600 (pre HD)  Renal Function Monitoring: Daily BMP and UOP  Pharmacy will continue to follow closely for s/sx of nephrotoxicity, infusion reactions and appropriateness of therapy.  BMP and CBC will be ordered per protocol. We will continue to follow the patient’s culture results and clinical progress daily.    Virginie Joseph, Pharmacist

## 2024-03-12 NOTE — H&P
[facility]  H&P  Name: Naresh Carpio 63 y.o. male I MRN: [id number]  Unit/Bed#: [address] [place] [address] Date of Admission: 3/12/2024   Date of Service: 3/12/2024 I Hospital Day: 0      Assessment/Plan   Facial cellulitis  Assessment & Plan  Patient presents with acute onset right facial pain, with associated redness, swelling    CT face:Extensive superficial and deep soft tissue cellulitis involving multiple spaces in the right greater than left face and neck extending inferiorly to the level of thyroid cartilage as described. The caudal portion of the inflammatory change is not completely imaged. Visualized airways are patent. No abscess. 2.  Artifact from prior dental fillings prevents reliable evaluation for dental caries. There is no periapical disease. 3.  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  Hx: Pseudomonas/MRSA-pending  ED: Vancomycin/Rocephin  ABX: Vancomycin/cefepime  Wcx: follow cx, adjust antibiotics as needed  Tylenol scheduled with Dilaudid as needed for breakthrough, home gabapentin  Consider ID if worsening            Electrolyte abnormality  Assessment & Plan  NA-130  K-5.4  Cl-94  We will replenish and monitor  HD planned, nephro following    Type 2 diabetes mellitus, without long-term current use of insulin (HCC)  Assessment & Plan  Lab Results   Component Value Date    HGBA1C 6.6 (H) 07/07/2022       Repeat [id number]  Continue home Lantus 15 units subcu  SSI in place  Carb controlled diet    ESRD (end stage renal disease) (Formerly [facility])  Assessment & Plan  Lab Results   Component Value Date    EGFR 5 03/12/2024    EGFR 3 01/24/2024    EGFR 10 09/06/2023    CREATININE 9.09 (H) 03/12/2024    CREATININE 12.48 (H) 01/24/2024    CREATININE 5.13 (H) 09/06/2023     Continue home sevelamer  Avoid nephrotoxic's  Nephro consulted    Medical history non-contributory  Assessment & Plan  Chronic conditions, controlled with home  medication    GERD-continue home famotidine 10 mg  HTN-continue home nifedipine 30 mg twice daily  HLD-continue home statin 80 mg  Gout-reevaluate home allopurinol         VTE Pharmacologic Prophylaxis:   Moderate Risk (Score 3-4) - Pharmacological DVT Prophylaxis Ordered: enoxaparin (Lovenox).  Code Status: Level 1 - Full Code   Discussion with family: Patient declined call to .     Anticipated Length of Stay: Patient will be admitted on an inpatient basis with an anticipated length of stay of greater than 2 midnights secondary to clinical course.    Total Time Spent on Date of Encounter in care of patient: 40 mins. This time was spent on one or more of the following: performing physical exam; counseling and coordination of care; obtaining or reviewing history; documenting in the medical record; reviewing/ordering tests, medications or procedures; communicating with other healthcare professionals and discussing with patient's family/caregivers.    Chief Complaint: Skin changes    History of Present Illness:  Naresh Carpio is a 63 y.o. male with a PMH of ESRD on HD, DM, HTN, prior cellulitis who presents with facial skin changes since Saturday.  Patient reported having multiple pimples that he popped on his face and neck with resulting swelling, skin changes.  Patient reported no MRSA or Pseudomonas history to his knowledge.  Patient denied any blurry vision, chest pain, shortness of breath, difficulty swallowing.  Patient stated that his pain was currently 6/10 but gets shooting pain in his cheek that comes and goes that is 10 out of 10.  Patient reported having neuropathy in which his nephrologist initiated gabapentin 400 mg total a day.    Review of Systems:  Review of Systems   Constitutional:  Negative for chills and fever.   HENT:  Negative for ear pain and sore throat.    Eyes:  Negative for pain and visual disturbance.   Respiratory:  Negative for cough and shortness of breath.     Cardiovascular:  Negative for chest pain and palpitations.   Gastrointestinal:  Negative for abdominal pain, diarrhea, nausea and vomiting.   Genitourinary:  Negative for dysuria and hematuria.   Musculoskeletal:  Negative for arthralgias and back pain.   Skin:  Positive for color change and wound. Negative for rash.   Neurological:  Negative for seizures and syncope.   Psychiatric/Behavioral: Negative.     All other systems reviewed and are negative.      Past Medical and Surgical History:   Past Medical History:   Diagnosis Date    CKD     Diabetes mellitus (HCC)     Hyperlipidemia     Hypertension     Left toe amputee (HCC)     TIA (transient ischemic attack)        Past Surgical History:   Procedure Laterality Date    AMPUTATION Left     left foot resection 10 years ago dr tran    IR LOWER EXTREMITY ANGIOGRAM  08/29/2023    IR TEMPORARY DIALYSIS CATHETER PLACEMENT  08/25/2023    IR TUNNELED CENTRAL LINE REMOVAL  08/23/2023    IR TUNNELED DIALYSIS CATHETER PLACEMENT  01/27/2022    IR TUNNELED DIALYSIS CATHETER PLACEMENT  08/30/2023    IL AMPUTATION METATARSAL W/TOE SINGLE Right 8/31/2023    Procedure: RIGHT PARTIAL 1ST RAY RESECTION WITH  WOUND VAC APPLICATION;  Surgeon: Won Parmar DPM;  Location: [facility];  Service: Podiatry       Meds/Allergies:  Prior to Admission medications    Medication Sig Start Date End Date Taking? Authorizing Provider   acetaminophen (TYLENOL) 325 mg tablet Take 3 tablets (975 mg total) by mouth every 8 (eight) hours 7/31/22   Lore Silver DO   allopurinol (ZYLOPRIM) 100 mg tablet Take 100 mg by mouth daily 11/2/21 7/6/22  Historical Provider, MD   atorvastatin (LIPITOR) 80 mg tablet Take 80 mg by mouth daily 11/2/21 8/19/23  Historical Provider, MD   docusate sodium (COLACE) 100 mg capsule Take 1 capsule (100 mg total) by mouth 2 (two) times a day 7/31/22   Lore Silver DO   famotidine (PEPCID) 10 mg tablet Take 1 tablet (10 mg total) by mouth daily 8/1/22   Lore  DO Nadeem   insulin glargine (LANTUS) 100 units/mL subcutaneous injection Inject 15 Units under the skin daily at bedtime 9/6/23   Nallely Pisano PA-C   insulin lispro (HumaLOG) 100 units/mL injection Inject 5 Units under the skin 3 (three) times a day with meals 9/6/23   Nallely Pisano PA-C   lidocaine (LIDODERM) 5 % Apply 1 patch topically over 12 hours daily Remove & Discard patch within 12 hours or as directed by MD Do not start before September 7, 2023. 9/7/23   Nallely Pisano PA-C   methocarbamol (ROBAXIN) 500 mg tablet Take 1 tablet (500 mg total) by mouth every 12 (twelve) hours as needed for muscle spasms (back/hip pain and spasm) 7/31/22   Lore Silver DO   naloxone (NARCAN) 4 mg/0.1 mL nasal spray Administer 1 spray into a nostril. If no response after 2-3 minutes, give another dose in the other nostril using a new spray. 7/16/22   Lore Silver DO   NIFEdipine ER (ADALAT CC) 30 MG 24 hr tablet Take 30 mg by mouth 2 (two) times a day 11/15/21   Historical Provider, MD   polyethylene glycol (MIRALAX) 17 g packet Take 17 g by mouth daily Do not start before September 7, 2023. 9/7/23   Nallely Pisano PA-C   senna-docusate sodium (SENOKOT S) 8.6-50 mg per tablet Take 2 tablets by mouth 2 (two) times a day 9/6/23   Nallely Pisano PA-C   sevelamer (RENAGEL) 800 mg tablet Take 1 tablet (800 mg total) by mouth 3 (three) times a day with meals 2/2/22   Lore Silver DO     I have reviewed home medications using recent Epic encounter.    Allergies: No Known Allergies    Social History:  Marital Status:    Occupation:   Patient Pre-hospital Living Situation: Home  Patient Pre-hospital Level of Mobility: unable to be assessed at time of evaluation  Patient Pre-hospital Diet Restrictions:   Substance Use History:   Social History     Substance and Sexual Activity   Alcohol Use Not Currently    Alcohol/week: 0.0 standard drinks of alcohol    Comment: 0     Social History     Tobacco Use   Smoking Status  Never    Passive exposure: Never   Smokeless Tobacco Never     Social History     Substance and Sexual Activity   Drug Use Never       Family History:  History reviewed. No pertinent family history.    Physical Exam:     Vitals:   Blood Pressure: (!) 182/82 (03/12/24 1707)  Pulse: 75 (03/12/24 1707)  Temperature: 97.6 °F (36.4 °C) (03/12/24 1707)  Temp Source: Tympanic (03/12/24 1707)  Respirations: 18 (03/12/24 1707)  SpO2: 93 % (03/12/24 1145)    Physical Exam  Vitals and nursing note reviewed.   Constitutional:       General: He is not in acute distress.     Appearance: He is well-developed. He is obese. He is not ill-appearing or diaphoretic.   HENT:      Head: Normocephalic and atraumatic.   Eyes:      Conjunctiva/sclera: Conjunctivae normal.   Cardiovascular:      Rate and Rhythm: Normal rate and regular rhythm.      Heart sounds: No murmur heard.  Pulmonary:      Effort: Pulmonary effort is normal. No respiratory distress.      Breath sounds: Normal breath sounds. No rhonchi.   Chest:      Comments: port  Abdominal:      Palpations: Abdomen is soft.      Tenderness: There is no abdominal tenderness. There is no guarding or rebound.   Musculoskeletal:         General: No swelling.      Cervical back: Neck supple.   Skin:     General: Skin is warm and dry.      Capillary Refill: Capillary refill takes less than 2 seconds.   Neurological:      Mental Status: He is alert and oriented to person, place, and time.   Psychiatric:         Mood and Affect: Mood normal.         Behavior: Behavior normal.            Additional Data:     Lab Results:  Results from last 7 days   Lab Units 03/12/24  0939   WBC Thousand/uL 13.53*   HEMOGLOBIN g/dL 10.8*   HEMATOCRIT % 33.6*   PLATELETS Thousands/uL 162   NEUTROS PCT % 77*   LYMPHS PCT % 9*   MONOS PCT % 11   EOS PCT % 2     Results from last 7 days   Lab Units 03/12/24  0939   SODIUM mmol/L 130*   POTASSIUM mmol/L 5.4*   CHLORIDE mmol/L 94*   CO2 mmol/L 25   BUN mg/dL 74*    CREATININE mg/dL 9.09*   ANION GAP mmol/L 11   CALCIUM mg/dL 8.5   ALBUMIN g/dL 3.6   TOTAL BILIRUBIN mg/dL 0.42   ALK PHOS U/L 94   ALT U/L 25   AST U/L 44*   GLUCOSE RANDOM mg/dL 110         Results from last 7 days   Lab Units 03/12/24  1606   POC GLUCOSE mg/dl 175*         Results from last 7 days   Lab Units 03/12/24  0939   LACTIC ACID mmol/L 0.7       Lines/Drains:  Invasive Devices       Peripheral Intravenous Line  Duration             Peripheral IV 03/12/24 Distal;Right;Upper;Ventral (anterior) Arm <1 day              Hemodialysis Catheter  Duration             HD Permanent Double Catheter 195 days                        Imaging: Reviewed radiology reports from this admission including: CT face  CT facial bones w contrast   Final Result by Santosh Shah MD (03/12 1033)      1.  Extensive superficial and deep soft tissue cellulitis involving multiple spaces in the right greater than left face and neck extending inferiorly to the level of thyroid cartilage as described. The caudal portion of the inflammatory change is not    completely imaged. Visualized airways are patent. No abscess.   2.  Artifact from prior dental fillings prevents reliable evaluation for dental caries. There is no periapical disease.   3.  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         Workstation performed: [id number]             EKG and Other Studies Reviewed on Admission:   EKG: NSR. HR 78.    ** Please Note: This note has been constructed using a voice recognition system. **

## 2024-03-12 NOTE — PLAN OF CARE
Target UF Goal 4 L as tolerated. Patient dialyzing for 4 hours on 2 K bath for serum K of  5.4  per protocol. Treatment plan reviewed with Kalli Mcbride PA-C.   Problem: METABOLIC, FLUID AND ELECTROLYTES - ADULT  Goal: Electrolytes maintained within normal limits  Description: INTERVENTIONS:  - Monitor labs and assess patient for signs and symptoms of electrolyte imbalances  - Administer electrolyte replacement as ordered  - Monitor response to electrolyte replacements, including repeat lab results as appropriate  - Instruct patient on fluid and nutrition as appropriate  Outcome: Progressing  Goal: Fluid balance maintained  Description: INTERVENTIONS:  - Monitor labs   - Monitor I/O and WT  - Instruct patient on fluid and nutrition as appropriate  - Assess for signs & symptoms of volume excess or deficit  Outcome: Progressing   Post-Dialysis RN Treatment Note    Blood Pressure:  Pre 160/71 mm/Hg  Post 182/82 mmHg   EDW  108 kg    Weight:  Pre 111.2 kg   Post 107.7 kg   Mode of weight measurement: Standing Scale   Volume Removed  3500 ml    Treatment duration 240 minutes    NS given  No    Treatment shortened? No   Medications given during Rx None Reported   Estimated Kt/V  None Reported   Access type: Permacath/TDC   Access Issues: No    Report called to primary nurse   Yes Maxine Gregorio LPN

## 2024-03-12 NOTE — ASSESSMENT & PLAN NOTE
Chronic conditions, controlled with home medication    GERD-continue home famotidine 10 mg  HTN-continue home nifedipine 30 mg twice daily  HLD-continue home statin 80 mg  Gout-reevaluate home allopurinol

## 2024-03-13 LAB
ANION GAP SERPL CALCULATED.3IONS-SCNC: 11 MMOL/L (ref 4–13)
ATRIAL RATE: 78 BPM
BUN SERPL-MCNC: 52 MG/DL (ref 5–25)
CALCIUM SERPL-MCNC: 8.5 MG/DL (ref 8.4–10.2)
CHLORIDE SERPL-SCNC: 94 MMOL/L (ref 96–108)
CO2 SERPL-SCNC: 27 MMOL/L (ref 21–32)
CREAT SERPL-MCNC: 6.09 MG/DL (ref 0.6–1.3)
GFR SERPL CREATININE-BSD FRML MDRD: 8 ML/MIN/1.73SQ M
GLUCOSE SERPL-MCNC: 160 MG/DL (ref 65–140)
GLUCOSE SERPL-MCNC: 168 MG/DL (ref 65–140)
GLUCOSE SERPL-MCNC: 185 MG/DL (ref 65–140)
GLUCOSE SERPL-MCNC: 192 MG/DL (ref 65–140)
GLUCOSE SERPL-MCNC: 208 MG/DL (ref 65–140)
P AXIS: 18 DEGREES
POTASSIUM SERPL-SCNC: 3.8 MMOL/L (ref 3.5–5.3)
PR INTERVAL: 192 MS
QRS AXIS: 68 DEGREES
QRSD INTERVAL: 92 MS
QT INTERVAL: 382 MS
QTC INTERVAL: 435 MS
SODIUM SERPL-SCNC: 132 MMOL/L (ref 135–147)
T WAVE AXIS: 11 DEGREES
VENTRICULAR RATE: 78 BPM

## 2024-03-13 PROCEDURE — 93010 ELECTROCARDIOGRAM REPORT: CPT | Performed by: INTERNAL MEDICINE

## 2024-03-13 PROCEDURE — 80048 BASIC METABOLIC PNL TOTAL CA: CPT | Performed by: PHYSICIAN ASSISTANT

## 2024-03-13 PROCEDURE — 87081 CULTURE SCREEN ONLY: CPT | Performed by: STUDENT IN AN ORGANIZED HEALTH CARE EDUCATION/TRAINING PROGRAM

## 2024-03-13 PROCEDURE — 82948 REAGENT STRIP/BLOOD GLUCOSE: CPT

## 2024-03-13 PROCEDURE — 97165 OT EVAL LOW COMPLEX 30 MIN: CPT

## 2024-03-13 PROCEDURE — 99232 SBSQ HOSP IP/OBS MODERATE 35: CPT | Performed by: STUDENT IN AN ORGANIZED HEALTH CARE EDUCATION/TRAINING PROGRAM

## 2024-03-13 RX ORDER — INSULIN LISPRO 100 [IU]/ML
3 INJECTION, SOLUTION INTRAVENOUS; SUBCUTANEOUS
Status: DISCONTINUED | OUTPATIENT
Start: 2024-03-13 | End: 2024-03-19

## 2024-03-13 RX ORDER — HYDROMORPHONE HCL IN WATER/PF 6 MG/30 ML
0.2 PATIENT CONTROLLED ANALGESIA SYRINGE INTRAVENOUS EVERY 4 HOURS PRN
Status: DISCONTINUED | OUTPATIENT
Start: 2024-03-13 | End: 2024-03-14

## 2024-03-13 RX ORDER — ACETAMINOPHEN 325 MG/1
975 TABLET ORAL EVERY 8 HOURS SCHEDULED
Status: DISCONTINUED | OUTPATIENT
Start: 2024-03-14 | End: 2024-03-22 | Stop reason: HOSPADM

## 2024-03-13 RX ORDER — ACETAMINOPHEN 10 MG/ML
1000 INJECTION, SOLUTION INTRAVENOUS EVERY 6 HOURS SCHEDULED
Status: DISCONTINUED | OUTPATIENT
Start: 2024-03-13 | End: 2024-03-13

## 2024-03-13 RX ORDER — METRONIDAZOLE 500 MG/100ML
500 INJECTION, SOLUTION INTRAVENOUS EVERY 8 HOURS
Status: DISCONTINUED | OUTPATIENT
Start: 2024-03-13 | End: 2024-03-15

## 2024-03-13 RX ORDER — SACCHAROMYCES BOULARDII 250 MG
250 CAPSULE ORAL 2 TIMES DAILY
Status: DISCONTINUED | OUTPATIENT
Start: 2024-03-13 | End: 2024-03-22 | Stop reason: HOSPADM

## 2024-03-13 RX ORDER — ACETAMINOPHEN 10 MG/ML
1000 INJECTION, SOLUTION INTRAVENOUS EVERY 6 HOURS SCHEDULED
Status: COMPLETED | OUTPATIENT
Start: 2024-03-13 | End: 2024-03-13

## 2024-03-13 RX ORDER — HYDROMORPHONE HCL IN WATER/PF 6 MG/30 ML
0.2 PATIENT CONTROLLED ANALGESIA SYRINGE INTRAVENOUS EVERY 4 HOURS PRN
Status: DISCONTINUED | OUTPATIENT
Start: 2024-03-13 | End: 2024-03-13

## 2024-03-13 RX ADMIN — FAMOTIDINE 10 MG: 20 TABLET, FILM COATED ORAL at 08:20

## 2024-03-13 RX ADMIN — ACETAMINOPHEN 1000 MG: 10 INJECTION INTRAVENOUS at 12:00

## 2024-03-13 RX ADMIN — GABAPENTIN 100 MG: 100 CAPSULE ORAL at 17:11

## 2024-03-13 RX ADMIN — HYDROMORPHONE HYDROCHLORIDE 0.2 MG: 0.2 INJECTION, SOLUTION INTRAMUSCULAR; INTRAVENOUS; SUBCUTANEOUS at 23:30

## 2024-03-13 RX ADMIN — INSULIN LISPRO 1 UNITS: 100 INJECTION, SOLUTION INTRAVENOUS; SUBCUTANEOUS at 08:21

## 2024-03-13 RX ADMIN — ACETAMINOPHEN 1000 MG: 10 INJECTION INTRAVENOUS at 00:20

## 2024-03-13 RX ADMIN — Medication 2.5 MG: at 21:52

## 2024-03-13 RX ADMIN — HEPARIN SODIUM 5000 UNITS: 5000 INJECTION INTRAVENOUS; SUBCUTANEOUS at 11:58

## 2024-03-13 RX ADMIN — HEPARIN SODIUM 5000 UNITS: 5000 INJECTION INTRAVENOUS; SUBCUTANEOUS at 01:39

## 2024-03-13 RX ADMIN — NIFEDIPINE 30 MG: 30 TABLET, EXTENDED RELEASE ORAL at 08:20

## 2024-03-13 RX ADMIN — HYDROMORPHONE HYDROCHLORIDE 0.2 MG: 0.2 INJECTION, SOLUTION INTRAMUSCULAR; INTRAVENOUS; SUBCUTANEOUS at 19:03

## 2024-03-13 RX ADMIN — NIFEDIPINE 30 MG: 30 TABLET, EXTENDED RELEASE ORAL at 17:11

## 2024-03-13 RX ADMIN — SEVELAMER HYDROCHLORIDE 800 MG: 800 TABLET, FILM COATED ORAL at 11:58

## 2024-03-13 RX ADMIN — ACETAMINOPHEN 1000 MG: 10 INJECTION INTRAVENOUS at 17:15

## 2024-03-13 RX ADMIN — HEPARIN SODIUM 5000 UNITS: 5000 INJECTION INTRAVENOUS; SUBCUTANEOUS at 17:11

## 2024-03-13 RX ADMIN — INSULIN LISPRO 1 UNITS: 100 INJECTION, SOLUTION INTRAVENOUS; SUBCUTANEOUS at 21:27

## 2024-03-13 RX ADMIN — GABAPENTIN 100 MG: 100 CAPSULE ORAL at 08:20

## 2024-03-13 RX ADMIN — INSULIN LISPRO 2 UNITS: 100 INJECTION, SOLUTION INTRAVENOUS; SUBCUTANEOUS at 11:59

## 2024-03-13 RX ADMIN — HYDROMORPHONE HYDROCHLORIDE 0.2 MG: 0.2 INJECTION, SOLUTION INTRAMUSCULAR; INTRAVENOUS; SUBCUTANEOUS at 08:33

## 2024-03-13 RX ADMIN — GABAPENTIN 100 MG: 100 CAPSULE ORAL at 11:58

## 2024-03-13 RX ADMIN — CEFEPIME HYDROCHLORIDE 1000 MG: 1 INJECTION, SOLUTION INTRAVENOUS at 15:21

## 2024-03-13 RX ADMIN — METRONIDAZOLE 500 MG: 500 INJECTION, SOLUTION INTRAVENOUS at 19:04

## 2024-03-13 RX ADMIN — INSULIN GLARGINE 15 UNITS: 100 INJECTION, SOLUTION SUBCUTANEOUS at 21:26

## 2024-03-13 RX ADMIN — SEVELAMER HYDROCHLORIDE 800 MG: 800 TABLET, FILM COATED ORAL at 08:20

## 2024-03-13 RX ADMIN — SEVELAMER HYDROCHLORIDE 800 MG: 800 TABLET, FILM COATED ORAL at 16:04

## 2024-03-13 RX ADMIN — INSULIN LISPRO 2 UNITS: 100 INJECTION, SOLUTION INTRAVENOUS; SUBCUTANEOUS at 16:06

## 2024-03-13 RX ADMIN — GABAPENTIN 100 MG: 100 CAPSULE ORAL at 21:26

## 2024-03-13 RX ADMIN — Medication 250 MG: at 19:03

## 2024-03-13 RX ADMIN — ATORVASTATIN CALCIUM 80 MG: 40 TABLET, FILM COATED ORAL at 16:04

## 2024-03-13 NOTE — PROGRESS NOTES
Naresh Carpio is a 63 y.o. male who is currently ordered Vancomycin IV with management by the Pharmacy Consult service.  Relevant clinical data and objective / subjective history reviewed.  Vancomycin Assessment:  Indication and Goal AUC/Trough: Soft tissue (goal -600, trough >10)  Clinical Status: stable  Micro:   pending  Renal Function:  SCr: 6.09 mg/dL  CrCl: 15.9 mL/min  Renal replacement: HD  Days of Therapy: 2  Current Dose: 2000mg IV once (LD)  Vancomycin Plan:  New Dosinmg IV pst HD days (--SAT)  Target Pre-HD Level Goal : 15-20 mcg/mL  Next Level: 3/16/24 at 0600  Renal Function Monitoring: Daily BMP and UOP  Pharmacy will continue to follow closely for s/sx of nephrotoxicity, infusion reactions and appropriateness of therapy.  BMP and CBC will be ordered per protocol. We will continue to follow the patient’s culture results and clinical progress daily.    Radha Landa, Pharmacist

## 2024-03-13 NOTE — OCCUPATIONAL THERAPY NOTE
"OT EVALUATION    Patient is independent with ADLS and ambulation.  No further skilled OT or adaptive equipment needs at this time.  Discharge recommendations home.       03/13/24 1240   OT Last Visit   OT Visit Date 03/13/24   Note Type   Note type Evaluation   Pain Assessment   Pain Assessment Tool 0-10   Pain Score 5   Pain Location/Orientation Orientation: Right;Location: Face   Restrictions/Precautions   Other Precautions Pain   Home Living   Type of Home House   Home Layout Multi-level;Laundry in basement;Stairs to enter with rails;Able to live on main level with bedroom/bathroom  (8 [address]ZAR[address])   Bathroom Shower/Tub Walk-in shower   Bathroom Toilet Standard   Bathroom Accessibility Accessible   Home Equipment Walker   Additional Comments Pt ambulated independently without AD PTA.   Prior Function   Level of [place] Independent with ADLs;Independent with functional mobility;Independent with IADLS   Lives With Alone  (sister lives in the finished basement, but separate from pt's home)   Receives Help From Family   IADLs Independent with driving;Independent with meal prep;Independent with medication management   Falls in the last 6 months 0   Comments Last fall in Aug 2023.   Lifestyle   Autonomy Independent with all ADLS/IADLS, driving, shopping; sister assists with laundry but pt can do this himself if needed   Reciprocal Relationships sister and daughter   Intrinsic Gratification tv, talking on the phone   Subjective   Subjective \"I have a rough time the past 7 years\"   ADL   Where Assessed Edge of bed   Eating Assistance 7  Independent   Grooming Assistance 7  Independent   UB Bathing Assistance 7  Independent   LB Bathing Assistance 7  Independent   UB Dressing Assistance 7  Independent   LB Dressing Assistance 7  Independent   Toileting Assistance  7  Independent   Bed Mobility   Supine to Sit 7  Independent   Sit to Supine 7  Independent   Transfers   Sit to Stand 7  Independent   Stand to Sit 7  Independent "   Stand pivot 7  Independent   Functional Mobility   Functional Mobility 7  Independent   Additional Comments 150 feet and stairs with railing;   Balance   Static Sitting Normal   Dynamic Sitting Good   Static Standing Good   Dynamic Standing Good   Ambulatory Good   Activity Tolerance   Activity Tolerance Patient tolerated treatment well   RUE Assessment   RUE Assessment WFL   LUE Assessment   LUE Assessment WFL   Hand Function   Gross Motor Coordination Functional   Fine Motor Coordination Functional   Psychosocial   Psychosocial (WDL) WDL   Patient Behaviors/Mood Calm;Cooperative   Needs Expressed Physical   Ability to Express Feelings Able to express   Ability to Express Needs Able to express   Ability to Express Thoughts Able to express   Ability to Understand Others Understands   Cognition   Overall Cognitive Status WFL   Arousal/Participation Alert;Cooperative   Attention Within functional limits   Orientation Level Oriented X4   Memory Within functional limits   Following Commands Follows all commands and directions without difficulty   Assessment   Limitation Decreased endurance   Prognosis Good   Assessment Patient evaluated by Occupational Therapy.  Patient admitted with <principal problem not specified>.  The patients occupational profile, medical and therapy history includes a expanded review of medical and/or therapy records and additional review of physical, cognitive, or psychosocial history related to current functional performance.  Comorbidities affecting functional mobility and ADLS include: ESRD on HD, DM, HTN, prior cellulitis, TIA and Right toe amputation.  Prior to admission, patient was independent with functional mobility without assistive device, independent with ADLS, independent with IADLS, ambulating household distance, and ambulating community distances.  The evaluation identifies the following performance deficits: decreased endurance and pain, that result in activity limitations  and/or participation restrictions. This evaluation requires clinical decision making of low complexity, because the patients presents with no comorbidities that affect occupational performance and required no modification of tasks or assistance with consideration of a limited number of treatment options.  The Barthel Index was used as a functional outcome tool presenting with a score of Barthel Index Score: 100, indicating no limitations of functional mobility and ADLS.  The patient's raw score on the AM-PAC Daily Activity Inpatient Short Form is 24. A raw score of greater than or equal to 19 suggests the patient may benefit from discharge to home. Please refer to the recommendation of the Occupational Therapist for safe discharge planning.  Patient is independent with ADLS and ambulation.  No further skilled OT or adaptive equipment needs at this time.  Discharge recommendations home.   Goals   Patient Goals n/a   Plan   Goal Expiration Date 03/13/24   OT Frequency Eval only   Discharge Recommendation   Rehab Resource Intensity Level, OT No post-acute rehabilitation needs   AM-PAC Daily Activity Inpatient   Lower Body Dressing 4   Bathing 4   Toileting 4   Upper Body Dressing 4   Grooming 4   Eating 4   Daily Activity Raw Score 24   Daily Activity Standardized Score (Calc for Raw Score >=11) 57.54   -PAC Applied Cognition Inpatient   Following a Speech/Presentation 4   Understanding Ordinary Conversation 4   Taking Medications 4   Remembering Where Things Are Placed or Put Away 4   Remembering List of 4-5 Errands 4   Taking Care of Complicated Tasks 4   Applied Cognition Raw Score 24   Applied Cognition Standardized Score 62.21   Barthel Index   Feeding 10   Bathing 5   Grooming Score 5   Dressing Score 10   Bladder Score 10   Bowels Score 10   Toilet Use Score 10   Transfers (Bed/Chair) Score 15   Mobility (Level Surface) Score 15   Stairs Score 10   Barthel Index Score 100   End of Consult   Patient Position at  End of Consult Seated edge of bed;All needs within reach   Nurse Communication Nurse aware of consult   Licensure   [place] License Number  Farheen Conklin MS, OTR/L, [place] Lic# [id number]

## 2024-03-13 NOTE — PHYSICAL THERAPY NOTE
PHYSICAL THERAPY     03/13/24 1340   Note Type   Note type Screen   Additional Comments no skilled PT needs as per OT evaluation. Patient is independent with transfers and gait. DC PT.   Licensure   [place] License Number  Anat Lal PT [id number]

## 2024-03-13 NOTE — PLAN OF CARE
Problem: METABOLIC, FLUID AND ELECTROLYTES - ADULT  Goal: Electrolytes maintained within normal limits  Description: INTERVENTIONS:  - Monitor labs and assess patient for signs and symptoms of electrolyte imbalances  - Administer electrolyte replacement as ordered  - Monitor response to electrolyte replacements, including repeat lab results as appropriate  - Instruct patient on fluid and nutrition as appropriate  Outcome: Progressing  Goal: Fluid balance maintained  Description: INTERVENTIONS:  - Monitor labs   - Monitor I/O and WT  - Instruct patient on fluid and nutrition as appropriate  - Assess for signs & symptoms of volume excess or deficit  Outcome: Progressing  Goal: Glucose maintained within target range  Description: INTERVENTIONS:  - Monitor Blood Glucose as ordered  - Assess for signs and symptoms of hyperglycemia and hypoglycemia  - Administer ordered medications to maintain glucose within target range  - Assess nutritional intake and initiate nutrition service referral as needed  Outcome: Progressing     Problem: PAIN - ADULT  Goal: Verbalizes/displays adequate comfort level or baseline comfort level  Description: Interventions:  - Encourage patient to monitor pain and request assistance  - Assess pain using appropriate pain scale  - Administer analgesics based on type and severity of pain and evaluate response  - Implement non-pharmacological measures as appropriate and evaluate response  - Consider cultural and social influences on pain and pain management  - Notify physician/advanced practitioner if interventions unsuccessful or patient reports new pain  Outcome: Progressing     Problem: INFECTION - ADULT  Goal: Absence or prevention of progression during hospitalization  Description: INTERVENTIONS:  - Assess and monitor for signs and symptoms of infection  - Monitor lab/diagnostic results  - Monitor all insertion sites, i.e. indwelling lines, tubes, and drains  - Monitor endotracheal if  appropriate and nasal secretions for changes in amount and color  - [place] appropriate cooling/warming therapies per order  - Administer medications as ordered  - Instruct and encourage patient and family to use good hand hygiene technique  - Identify and instruct in appropriate isolation precautions for identified infection/condition  Outcome: Progressing  Goal: Absence of fever/infection during neutropenic period  Description: INTERVENTIONS:  - Monitor WBC    Outcome: Progressing     Problem: DISCHARGE PLANNING  Goal: Discharge to home or other facility with appropriate resources  Description: INTERVENTIONS:  - Identify barriers to discharge w/patient and caregiver  - Arrange for needed discharge resources and transportation as appropriate  - Identify discharge learning needs (meds, wound care, etc.)  - Arrange for interpretive services to assist at discharge as needed  - Refer to Case Management Department for coordinating discharge planning if the patient needs post-hospital services based on physician/advanced practitioner order or complex needs related to functional status, cognitive ability, or social support system  Outcome: Progressing     Problem: Knowledge Deficit  Goal: Patient/family/caregiver demonstrates understanding of disease process, treatment plan, medications, and discharge instructions  Description: Complete learning assessment and assess knowledge base.  Interventions:  - Provide teaching at level of understanding  - Provide teaching via preferred learning methods  Outcome: Progressing

## 2024-03-13 NOTE — ASSESSMENT & PLAN NOTE
Patient presents with acute onset right facial pain, with associated redness, swelling    CT face:Extensive superficial and deep soft tissue cellulitis involving multiple spaces in the right greater than left face and neck extending inferiorly to the level of thyroid cartilage as described. The caudal portion of the inflammatory change is not completely imaged. Visualized airways are patent. No abscess. 2.  Artifact from prior dental fillings prevents reliable evaluation for dental caries. There is no periapical disease. 3.  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  Hx: Pseudomonas/MRSA-pending  ED: Vancomycin/Rocephin  ABX: Vancomycin/cefepime  Wcx: follow cx, adjust antibiotics as needed  Tylenol scheduled with Dilaudid as needed for breakthrough, home gabapentin  D/W ENT recs:   Add extended oral antibiotic coverage Flagyl  No need for repeat CT  Consider ID if worsening

## 2024-03-13 NOTE — CASE MANAGEMENT
Case Management Assessment & Discharge Planning Note    Patient name Naresh Carpio  Location [address] [place] [address]/[address] [place] [address]-* MRN [id number]  : 1960 Date 3/13/2024       Current Admission Date: 3/12/2024  Current Admission Diagnosis:ESRD (end stage renal disease) ([facility])   Patient Active Problem List    Diagnosis Date Noted    Medical history non-contributory 2024    Facial cellulitis 2024    Constipation 2023    Bradycardia 2023    Cellulitis of right foot     Polymicrobial bacterial infection 2023    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 mellitus, with necrosis of bone ([facility])     Acquired deformity of foot, right     Charcot's joint of right foot     Subacute osteomyelitis of right foot ([facility])     Open wound of right foot 2023    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 mellitus, with bone involvement without evidence of necrosis ([facility])     Diabetic ulcer of left midfoot associated with type 2 diabetes mellitus, limited to breakdown of skin ([facility])     Diabetic polyneuropathy associated with type 2 diabetes mellitus ([facility])     Diabetes mellitus type 2 with peripheral artery disease ([facility])     History of amputation of lesser toe of left foot ([facility])     Onychomycosis     Status post fall 2023    Traumatic rhabdomyolysis ([facility]) 2023    Leukocytosis 2023    Electrolyte abnormality 2023    Osteoarthritis of left hip 2022    Severe Left Orchalgia 2022    Right shoulder pain 2022    Left hip pain 2022    Hemodialysis status ([facility])     Hypervolemia     Anemia 2022    ESRD (end stage renal disease) ([facility]) 2022    Type 2 diabetes mellitus, without long-term current use of insulin ([facility])     Hypertension     History of TIA (transient ischemic attack)     T10 vertebral fracture ([facility])       LOS (days): 1  Geometric Mean LOS (GMLOS) (days): 4.8  Days to GMLOS:3.7     OBJECTIVE:    Risk of  Unplanned Readmission Score: 22.55     Current admission status: Inpatient    [facility] Pharmacy:   [id number] [place] [place] [facility] [address]  [facility]  [address]  [facility] [address]  Phone: [phone] Fax: [phone]    Primary Care Provider: Cathy Schultz MD    Primary Insurance: MEDICARE  Secondary Insurance:     ASSESSMENT:  Active Health Care Proxies    There are no active Health Care Proxies on file.         Readmission Root Cause  30 Day Readmission: No    Patient Information  Admitted from:: Home  Mental Status: Alert  During Assessment patient was accompanied by: Not accompanied during assessment  Assessment information provided by:: Patient  Primary Caregiver: Family  Caregiver's Name:: Mercedes (Ex-wife/friend)  Caregiver's Relationship to Patient:: Family Member  Caregiver's Telephone Number:: [phone]  Support Systems: Self, Family members, Daughter  County of Residence: Other (specify in comment box[place] ([place]  What city do you live in?: [place]  Home entry access options. Select all that apply.: Stairs  Number of steps to enter home.: 7  Type of Current Residence: Apartment  Floor Level: 1  Upon entering residence, is there a bedroom on the main floor (no further steps)?: Yes  Upon entering residence, is there a bathroom on the main floor (no further steps)?: Yes  Living Arrangements: Other (Comment) (Lives with Family-Sister)    Activities of Daily Living Prior to Admission  Functional Status: Independent  Completes ADLs independently?: Yes  Ambulates independently?: Yes  Does patient use assisted devices?: No  Does patient currently own DME?: No  Does patient have a history of Outpatient Therapy (PT/OT)?: Yes  Does the patient have a history of Short-Term Rehab?: Yes (History of STR at [facility])  Does patient have a history of HHC?: No  Does patient currently have HHC?: No         Patient Information Continued  Income Source: SSI/SSD  Does  patient receive dialysis treatments?: Yes ([facility] in [place] [facility] at 10am-pt drives self to/from treataments.)  Does patient have a history of substance abuse?: No  Does patient have a history of Mental Health Diagnosis?: No         Means of Transportation  Means of Transport to Appts:: Drives Self      Social Determinants of Health (SDOH)      Flowsheet Row Most Recent Value   Housing Stability    In the last 12 months, was there a time when you were not able to pay the mortgage or rent on time? N   In the last 12 months, how many places have you lived? 2   In the last 12 months, was there a time when you did not have a steady place to sleep or slept in a shelter (including now)? N   Transportation Needs    In the past 12 months, has lack of transportation kept you from medical appointments or from getting medications? no   In the past 12 months, has lack of transportation kept you from meetings, work, or from getting things needed for daily living? No   Food Insecurity    Within the past 12 months, you worried that your food would run out before you got the money to buy more. Never true   Within the past 12 months, the food you bought just didn't last and you didn't have money to get more. Never true   Utilities    In the past 12 months has the electric, gas, oil, or water company threatened to shut off services in your home? No            DISCHARGE DETAILS:    Discharge planning discussed with:: Patient  Freedom of Choice: Yes     CM contacted family/caregiver?: Yes    Contacts  Patient Contacts: Mercedes Resendez (ex-wife)  Relationship to Patient:: Other (Comment) (Ex-wife/Friend)  Contact Method: Phone  Phone Number: [phone]  Reason/Outcome: Emergency Contact, Continuity of Care, Discharge Planning      CM met with patient bedside to introduce room, complete assessment, and discuss discharge planning. Patient stated he is fully independent and drives himself to dialysis treatments. Patient shared that  his diet restrictions for his DKA can be difficult at times as he was a  for over thirty years and enjoys good food.  However, patient has good perspective on how to approach his current medical issues, stating that he will just take things as they come and try his best to enjoy himself when he gets the chance.     No discharge needs identified at this time, CM will continue to follow.

## 2024-03-13 NOTE — PLAN OF CARE
Problem: Potential for Falls  Goal: Patient will remain free of falls  Description: INTERVENTIONS:  - Educate patient/family on patient safety including physical limitations  - Instruct patient to call for assistance with activity   - Consult OT/PT to assist with strengthening/mobility   - Keep Call bell within reach  - Keep bed low and locked with side rails adjusted as appropriate  - Keep care items and personal belongings within reach  - Initiate and maintain comfort rounds  - Make Fall Risk Sign visible to staff  - Offer Toileting every 2 Hours, in advance of need  - Obtain necessary fall risk management equipment: socks  - Apply yellow socks and bracelet for high fall risk patients  - Consider moving patient to room near nurses station  Outcome: Progressing     Problem: METABOLIC, FLUID AND ELECTROLYTES - ADULT  Goal: Electrolytes maintained within normal limits  Description: INTERVENTIONS:  - Monitor labs and assess patient for signs and symptoms of electrolyte imbalances  - Administer electrolyte replacement as ordered  - Monitor response to electrolyte replacements, including repeat lab results as appropriate  - Instruct patient on fluid and nutrition as appropriate  Outcome: Progressing  Goal: Fluid balance maintained  Description: INTERVENTIONS:  - Monitor labs   - Monitor I/O and WT  - Instruct patient on fluid and nutrition as appropriate  - Assess for signs & symptoms of volume excess or deficit  Outcome: Progressing  Goal: Glucose maintained within target range  Description: INTERVENTIONS:  - Monitor Blood Glucose as ordered  - Assess for signs and symptoms of hyperglycemia and hypoglycemia  - Administer ordered medications to maintain glucose within target range  - Assess nutritional intake and initiate nutrition service referral as needed  Outcome: Progressing     Problem: PAIN - ADULT  Goal: Verbalizes/displays adequate comfort level or baseline comfort level  Description: Interventions:  -  -- DO NOT REPLY / DO NOT REPLY ALL --  -- Message is from Engagement Center Operations (ECO) --      Anita with [facility] calling back regarding re-certification orders- she needs to discuss change in frequency.  Please call to advise.  Thank you!         Encourage patient to monitor pain and request assistance  - Assess pain using appropriate pain scale  - Administer analgesics based on type and severity of pain and evaluate response  - Implement non-pharmacological measures as appropriate and evaluate response  - Consider cultural and social influences on pain and pain management  - Notify physician/advanced practitioner if interventions unsuccessful or patient reports new pain  Outcome: Progressing     Problem: INFECTION - ADULT  Goal: Absence or prevention of progression during hospitalization  Description: INTERVENTIONS:  - Assess and monitor for signs and symptoms of infection  - Monitor lab/diagnostic results  - Monitor all insertion sites, i.e. indwelling lines, tubes, and drains  - Monitor endotracheal if appropriate and nasal secretions for changes in amount and color  - [place] appropriate cooling/warming therapies per order  - Administer medications as ordered  - Instruct and encourage patient and family to use good hand hygiene technique  - Identify and instruct in appropriate isolation precautions for identified infection/condition  Outcome: Progressing  Goal: Absence of fever/infection during neutropenic period  Description: INTERVENTIONS:  - Monitor WBC    Outcome: Progressing     Problem: SAFETY ADULT  Goal: Patient will remain free of falls  Description: INTERVENTIONS:  - Educate patient/family on patient safety including physical limitations  - Instruct patient to call for assistance with activity   - Consult OT/PT to assist with strengthening/mobility   - Keep Call bell within reach  - Keep bed low and locked with side rails adjusted as appropriate  - Keep care items and personal belongings within reach  - Initiate and maintain comfort rounds  - Make Fall Risk Sign visible to staff  - Offer Toileting every 2 Hours, in advance of need  - Obtain necessary fall risk management equipment: socks  - Apply yellow socks and bracelet for high fall risk  patients  - Consider moving patient to room near nurses station  Outcome: Progressing  Goal: Maintain or return to baseline ADL function  Description: INTERVENTIONS:  -  Assess patient's ability to carry out ADLs; assess patient's baseline for ADL function and identify physical deficits which impact ability to perform ADLs (bathing, care of mouth/teeth, toileting, grooming, dressing, etc.)  - Assess/evaluate cause of self-care deficits   - Assess range of motion  - Assess patient's mobility; develop plan if impaired  - Assess patient's need for assistive devices and provide as appropriate  - Encourage maximum independence but intervene and supervise when necessary  - Involve family in performance of ADLs  - Assess for home care needs following discharge   - Consider OT consult to assist with ADL evaluation and planning for discharge  - Provide patient education as appropriate  Outcome: Progressing  Goal: Maintains/Returns to pre admission functional level  Description: INTERVENTIONS:  - Perform AM-PAC 6 Click Basic Mobility/ Daily Activity assessment daily.  - Set and communicate daily mobility goal to care team and patient/family/caregiver.   - Collaborate with rehabilitation services on mobility goals if consulted  - Perform Range of Motion 3 times a day.  - Reposition patient every 2 hours.  - Dangle patient 3 times a day  - Stand patient 3 times a day  - Ambulate patient 3 times a day  - Out of bed to chair 3 times a day   - Out of bed for meals 3 times a day  - Out of bed for toileting  - Record patient progress and toleration of activity level   Outcome: Progressing     Problem: DISCHARGE PLANNING  Goal: Discharge to home or other facility with appropriate resources  Description: INTERVENTIONS:  - Identify barriers to discharge w/patient and caregiver  - Arrange for needed discharge resources and transportation as appropriate  - Identify discharge learning needs (meds, wound care, etc.)  - Arrange for  interpretive services to assist at discharge as needed  - Refer to Case Management Department for coordinating discharge planning if the patient needs post-hospital services based on physician/advanced practitioner order or complex needs related to functional status, cognitive ability, or social support system  Outcome: Progressing     Problem: Knowledge Deficit  Goal: Patient/family/caregiver demonstrates understanding of disease process, treatment plan, medications, and discharge instructions  Description: Complete learning assessment and assess knowledge base.  Interventions:  - Provide teaching at level of understanding  - Provide teaching via preferred learning methods  Outcome: Progressing

## 2024-03-13 NOTE — ASSESSMENT & PLAN NOTE
Lab Results   Component Value Date    EGFR 8 03/13/2024    EGFR 5 03/12/2024    EGFR 3 01/24/2024    CREATININE 6.09 (H) 03/13/2024    CREATININE 9.09 (H) 03/12/2024    CREATININE 12.48 (H) 01/24/2024     Continue home sevelamer  Avoid nephrotoxic's  Nephro following for HD management

## 2024-03-13 NOTE — PROGRESS NOTES
[facility]  Progress Note  Name: Naresh Carpio I  MRN: [id number]  Unit/Bed#: [address] [place] [address] Date of Admission: 3/12/2024   Date of Service: 3/13/2024 I Hospital Day: 1    Assessment/Plan   Facial cellulitis  Assessment & Plan  Patient presents with acute onset right facial pain, with associated redness, swelling    CT face:Extensive superficial and deep soft tissue cellulitis involving multiple spaces in the right greater than left face and neck extending inferiorly to the level of thyroid cartilage as described. The caudal portion of the inflammatory change is not completely imaged. Visualized airways are patent. No abscess. 2.  Artifact from prior dental fillings prevents reliable evaluation for dental caries. There is no periapical disease. 3.  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  Hx: Pseudomonas/MRSA-pending  ED: Vancomycin/Rocephin  ABX: Vancomycin/cefepime  Wcx: follow cx, adjust antibiotics as needed  Tylenol scheduled with Dilaudid as needed for breakthrough, home gabapentin  D/W ENT recs:   Add extended oral antibiotic coverage Flagyl  No need for repeat CT  Consider ID if worsening            Electrolyte abnormality  Assessment & Plan  NA-130 >132  K-5.4  Cl-94  We will replenish and monitor  HD planned, nephro following    Type 2 diabetes mellitus, without long-term current use of insulin (HCC)  Assessment & Plan  Lab Results   Component Value Date    HGBA1C 5.4 03/12/2024     (P) 211.6  Repeat A1c-5.4%  Continue home Lantus 15 units subcu  SSI in place  Will adjust basal and prandial  Carb controlled diet    ESRD (end stage renal disease) (HCC)  Assessment & Plan  Lab Results   Component Value Date    EGFR 8 03/13/2024    EGFR 5 03/12/2024    EGFR 3 01/24/2024    CREATININE 6.09 (H) 03/13/2024    CREATININE 9.09 (H) 03/12/2024    CREATININE 12.48 (H) 01/24/2024     Continue home sevelamer  Avoid  nephrotoxic's  Nephro following for HD management    Medical history non-contributory  Assessment & Plan  Chronic conditions, controlled with home medication    GERD-continue home famotidine 10 mg  HTN-continue home nifedipine 30 mg twice daily  HLD-continue home statin 80 mg  Gout-reevaluate home allopurinol  Neuropathy- gabapentin         VTE Pharmacologic Prophylaxis:   Moderate Risk (Score 3-4) - Pharmacological DVT Prophylaxis Ordered: heparin.    Mobility:   Basic Mobility Inpatient Raw Score: 24  JH-HLM Goal: 8: Walk 250 feet or more  JH-HLM Achieved: 7: Walk 25 feet or more  HLM Goal NOT achieved. Continue with multidisciplinary rounding and encourage appropriate mobility to improve upon HLM goals.    Patient Centered Rounds: I evaluated the patient without nursing staff present due to alternate clinical responsibilities    Discussions with Specialists or Other Care Team Provider: ENT    Education and Discussions with Family / Patient: Patient declined call to .     Total Time Spent on Date of Encounter in care of patient: 40 mins. This time was spent on one or more of the following: performing physical exam; counseling and coordination of care; obtaining or reviewing history; documenting in the medical record; reviewing/ordering tests, medications or procedures; communicating with other healthcare professionals and discussing with patient's family/caregivers.    Current Length of Stay: 1 day(s)  Current Patient Status: Inpatient   Certification Statement: The patient will continue to require additional inpatient hospital stay due to IV antibiotics and specialist recs  Discharge Plan: Anticipate discharge tomorrow to home.    Code Status: Level 1 - Full Code    Subjective:   Patient seen and examined at bedside, reported no headache, blurry vision no chest pain or shortness of breath, no fever no chills, reported face feels harder, informed contact surgeon for I&D.  Stated that gets  intermittent cheek pain that is shooting in nature.  Reported neuropathy was appropriate.    Objective:     Vitals:   Temp (24hrs), Av.6 °F (36.4 °C), Min:97.3 °F (36.3 °C), Max:97.8 °F (36.6 °C)    Temp:  [97.3 °F (36.3 °C)-97.8 °F (36.6 °C)] 97.3 °F (36.3 °C)  HR:  [59-68] 59  Resp:  [18-20] 18  BP: (120-164)/(59-72) 120/59  SpO2:  [97 %-100 %] 97 %  There is no height or weight on file to calculate BMI.     Input and Output Summary (last 24 hours):     Intake/Output Summary (Last 24 hours) at 3/13/2024 1805  Last data filed at 3/13/2024 0300  Gross per 24 hour   Intake 0 ml   Output 0 ml   Net 0 ml       Physical Exam:   Physical Exam  Vitals and nursing note reviewed.   Constitutional:       General: He is not in acute distress.     Appearance: He is well-developed.   HENT:      Head: Normocephalic and atraumatic.      Comments: Right cheek erythema with reducing margins, now boggy, no warmth, nontender  Eyes:      Conjunctiva/sclera: Conjunctivae normal.   Cardiovascular:      Rate and Rhythm: Normal rate and regular rhythm.      Heart sounds: No murmur heard.  Pulmonary:      Effort: Pulmonary effort is normal. No respiratory distress.      Breath sounds: Normal breath sounds. No wheezing or rhonchi.   Abdominal:      Palpations: Abdomen is soft.      Tenderness: There is no abdominal tenderness. There is no rebound.   Musculoskeletal:         General: No swelling.      Cervical back: Neck supple.      Right lower leg: No edema.      Left lower leg: No edema.   Skin:     General: Skin is warm and dry.      Capillary Refill: Capillary refill takes less than 2 seconds.   Neurological:      Mental Status: He is alert and oriented to person, place, and time.   Psychiatric:         Mood and Affect: Mood normal.         Behavior: Behavior normal.          Additional Data:     Labs:  Results from last 7 days   Lab Units 24  0939   WBC Thousand/uL 13.53*   HEMOGLOBIN g/dL 10.8*   HEMATOCRIT % 33.6*    PLATELETS Thousands/uL 162   NEUTROS PCT % 77*   LYMPHS PCT % 9*   MONOS PCT % 11   EOS PCT % 2     Results from last 7 days   Lab Units 24  0505 24  0939   SODIUM mmol/L 132* 130*   POTASSIUM mmol/L 3.8 5.4*   CHLORIDE mmol/L 94* 94*   CO2 mmol/L 27 25   BUN mg/dL 52* 74*   CREATININE mg/dL 6.09* 9.09*   ANION GAP mmol/L 11 11   CALCIUM mg/dL 8.5 8.5   ALBUMIN g/dL  --  3.6   TOTAL BILIRUBIN mg/dL  --  0.42   ALK PHOS U/L  --  94   ALT U/L  --  25   AST U/L  --  44*   GLUCOSE RANDOM mg/dL 185* 110         Results from last 7 days   Lab Units 24  1551 24  1051 24  0703 24  2024 24  1606   POC GLUCOSE mg/dl 208* 192* 160* 323* 175*     Results from last 7 days   Lab Units 24  0939   HEMOGLOBIN A1C % 5.4     Results from last 7 days   Lab Units 24  0939   LACTIC ACID mmol/L 0.7       Lines/Drains:  Invasive Devices       Peripheral Intravenous Line  Duration             Peripheral IV 24 Distal;Right;Upper;Ventral (anterior) Arm 1 day              Hemodialysis Catheter  Duration             HD Permanent Double Catheter 196 days                      Telemetry:  Telemetry Orders (From admission, onward)               24 Hour Telemetry Monitoring  Continuous x 24 Hours (Telem)           Question:  Reason for 24 Hour Telemetry  Answer:  Metabolic/electrolyte disturbance with high probability of dysrhythmia. K level <3 or >6 OR KCL infusion >10mEq/hr                            Imaging: Reviewed radiology reports from this admission including: Facial CT    Recent Cultures (last 7 days):   Results from last 7 days   Lab Units 24  1009   BLOOD CULTURE  No Growth at 24 hrs.  No Growth at 24 hrs.       Last 24 Hours Medication List:   Current Facility-Administered Medications   Medication Dose Route Frequency Provider Last Rate    atorvastatin  80 mg Oral Daily With Dinner Angie Bernard MD      cefepime  1,000 mg Intravenous Q24H Angie Bernard MD  1,000 mg (03/13/24 1521)    docusate sodium  100 mg Oral BID Angie Bernard MD      famotidine  10 mg Oral Daily Angie Bernard MD      gabapentin  100 mg Oral 4x Daily Angie Bernard MD      heparin (porcine)  5,000 Units Subcutaneous Q8H KEMAL Angie Bernard MD      insulin glargine  15 Units Subcutaneous HS Angie Bernard MD      insulin lispro  1-6 Units Subcutaneous TID AC Angie Bernard MD      insulin lispro  1-6 Units Subcutaneous HS Niharika Russell PA-C      metroNIDAZOLE  500 mg Intravenous Q8H Angie Bernard MD      NIFEdipine  30 mg Oral 2 times per day on Sunday Monday Wednesday Friday Kalli Mcbride PA-C      NIFEdipine  30 mg Oral Once per day on Tuesday Thursday Saturday Kalli Mcbride PA-C      ondansetron  4 mg Intravenous Q6H PRN Angie Bernard MD      polyethylene glycol  17 g Oral Daily Angie Bernard MD      saccharomyces boulardii  250 mg Oral BID Angie Bernard MD      sevelamer  800 mg Oral TID With Meals Angie Bernard MD      [START ON 3/14/2024] vancomycin  10 mg/kg (Adjusted) Intravenous Once per day on Tuesday Thursday Saturday Angie Bernard MD          Today, Patient Was Seen By: Angie Bernard MD    **Please Note: This note may have been constructed using a voice recognition system.**

## 2024-03-13 NOTE — ASSESSMENT & PLAN NOTE
Here for  well check with parent  doing well  feeding well  Voiding well and stools are good  ALLERGY:Reviewed  MED'S:Reviewed   IMMUNIZATIONS:Hep B given at birth  HEAR SCREEN:Pass  PKU:normal   DIET: formula enfamil infant  BH: reviewed  FH:reviewed  SH:Lives with family  DEVELOPMENT:Regards face, startles to noise,equal movements.  ROS   GEN:Not irritable, sleeps well on back,alert when awake   SKIN:No rash or lesions   HEENT:Appears to hear and see, no eye, ear or nasal discharge, nl suck and swallow,  nl neck movement   CHEST:NL breathing, no cough    CV:No fatigue,or cyanosis    ABD:NL BMs; no vomiting.  gassy   :NL urination, no apparent pain   MS: Moves extremities equally, no swelling   NEURO:NL cry, not irritable or lethargic, no abnormal movements  PHYSICAL:NL VS(see RN notes). Refer to Growth Chart   GEN:WDWN, active, not irritable.Pain scale 0/10   SKIN:Pink, well perfused, nl turgor, no edema, rash or lesions   HEAD:Nl facies, NCAT, AF open, soft, flat   EYES:Fixes gaze, EOMI, PERRL, nl red reflex, clear conjunctiva   EARS:NL pinnae and TMs, clear canals   NOSE:Patent nares, nl breathing, no discharge, midline septum   MOUTH:NL mandible, suck and swallow, palate intact, nl gums and tongue, + white patches to mouth    NECK:NL ROM, clavicles intact, no mass    LN:no enlarged cervical or inguinal nodes   CHEST:NL chest wall, scapulae and spine, no retractions or stridor, clear BBS   CV:RRR, no murmur, nl S1S2, , no CCE,nl femoral pulses   ABD:NL BS, ND, soft, NT; no HSM, mass or hernia,    :NL female,  no adhesions or discharge, no hernia or mass  MS:No deformity or swelling, nl ROM,neg.Ortalani and Isbell  NEURO:Symmetric movements, nl grasp,placement, Jon, tone, and strength  IMP:Well check   Gassy baby  Thrush   PLAN:Subjective Hear:PASS Subjective Vision:PASS. PDQ WNL  normal growth   normal development  Education feeding & Vit.D supplementation if breast fed but not if formula fed.  Lab Results   Component Value Date    HGBA1C 5.4 03/12/2024     (P) 211.6  Repeat A1c-5.4%  Continue home Lantus 15 units subcu  SSI in place  Will adjust basal and prandial  Carb controlled diet     Discussed safety(back sleep, hand wash,tobacco,car, don't over bundle,smoke detector)  Addressed parents concerns.  Interpretive Conference conducted.  Follow up @ 1 mo age & prn  Trial of similac pro-sensitive    CC: poss thrush    HPI: white patches seen in mouth.  Feeding well.  Sounds hoarse    PE: white patches to entire mouth    A: thrush    P: rx diflucan  Boil bottles daily    Answers for HPI/ROS submitted by the patient on 2017   activity change: No  appetite change : No  fever: No  congestion: No  mouth sores: Yes  eye discharge: No  eye redness: No  cough: No  wheezing: No  cyanosis: No  constipation: No  diarrhea: No  vomiting: No  urine decreased: No  hematuria: No  leg swelling: No  extremity weakness: No  rash: No  wound: No

## 2024-03-13 NOTE — ASSESSMENT & PLAN NOTE
Chronic conditions, controlled with home medication    GERD-continue home famotidine 10 mg  HTN-continue home nifedipine 30 mg twice daily  HLD-continue home statin 80 mg  Gout-reevaluate home allopurinol  Neuropathy- gabapentin

## 2024-03-14 ENCOUNTER — APPOINTMENT (INPATIENT)
Dept: DIALYSIS | Facility: HOSPITAL | Age: 64
DRG: 602 | End: 2024-03-14
Payer: MEDICARE

## 2024-03-14 ENCOUNTER — APPOINTMENT (INPATIENT)
Dept: RADIOLOGY | Facility: HOSPITAL | Age: 64
DRG: 602 | End: 2024-03-14
Payer: MEDICARE

## 2024-03-14 LAB
ANION GAP SERPL CALCULATED.3IONS-SCNC: 14 MMOL/L (ref 4–13)
BASOPHILS # BLD AUTO: 0.03 THOUSANDS/ÂΜL (ref 0–0.1)
BASOPHILS NFR BLD AUTO: 0 % (ref 0–1)
BUN SERPL-MCNC: 68 MG/DL (ref 5–25)
CALCIUM SERPL-MCNC: 8.4 MG/DL (ref 8.4–10.2)
CHLORIDE SERPL-SCNC: 94 MMOL/L (ref 96–108)
CO2 SERPL-SCNC: 22 MMOL/L (ref 21–32)
CREAT SERPL-MCNC: 7.64 MG/DL (ref 0.6–1.3)
EOSINOPHIL # BLD AUTO: 0.27 THOUSAND/ÂΜL (ref 0–0.61)
EOSINOPHIL NFR BLD AUTO: 2 % (ref 0–6)
ERYTHROCYTE [DISTWIDTH] IN BLOOD BY AUTOMATED COUNT: 18.6 % (ref 11.6–15.1)
GFR SERPL CREATININE-BSD FRML MDRD: 6 ML/MIN/1.73SQ M
GLUCOSE SERPL-MCNC: 128 MG/DL (ref 65–140)
GLUCOSE SERPL-MCNC: 137 MG/DL (ref 65–140)
GLUCOSE SERPL-MCNC: 146 MG/DL (ref 65–140)
GLUCOSE SERPL-MCNC: 95 MG/DL (ref 65–140)
GLUCOSE SERPL-MCNC: 99 MG/DL (ref 65–140)
HCT VFR BLD AUTO: 35.4 % (ref 36.5–49.3)
HGB BLD-MCNC: 11.5 G/DL (ref 12–17)
IMM GRANULOCYTES # BLD AUTO: 0.09 THOUSAND/UL (ref 0–0.2)
IMM GRANULOCYTES NFR BLD AUTO: 1 % (ref 0–2)
LYMPHOCYTES # BLD AUTO: 1.45 THOUSANDS/ÂΜL (ref 0.6–4.47)
LYMPHOCYTES NFR BLD AUTO: 13 % (ref 14–44)
MCH RBC QN AUTO: 30 PG (ref 26.8–34.3)
MCHC RBC AUTO-ENTMCNC: 32.5 G/DL (ref 31.4–37.4)
MCV RBC AUTO: 92 FL (ref 82–98)
MONOCYTES # BLD AUTO: 0.85 THOUSAND/ÂΜL (ref 0.17–1.22)
MONOCYTES NFR BLD AUTO: 7 % (ref 4–12)
MRSA NOSE QL CULT: NORMAL
NEUTROPHILS # BLD AUTO: 8.8 THOUSANDS/ÂΜL (ref 1.85–7.62)
NEUTS SEG NFR BLD AUTO: 77 % (ref 43–75)
NRBC BLD AUTO-RTO: 0 /100 WBCS
PLATELET # BLD AUTO: 237 THOUSANDS/UL (ref 149–390)
PMV BLD AUTO: 10.1 FL (ref 8.9–12.7)
POTASSIUM SERPL-SCNC: 3.8 MMOL/L (ref 3.5–5.3)
RBC # BLD AUTO: 3.83 MILLION/UL (ref 3.88–5.62)
SODIUM SERPL-SCNC: 130 MMOL/L (ref 135–147)
WBC # BLD AUTO: 11.49 THOUSAND/UL (ref 4.31–10.16)

## 2024-03-14 PROCEDURE — 5A1D70Z PERFORMANCE OF URINARY FILTRATION, INTERMITTENT, LESS THAN 6 HOURS PER DAY: ICD-10-PCS | Performed by: INTERNAL MEDICINE

## 2024-03-14 PROCEDURE — 82948 REAGENT STRIP/BLOOD GLUCOSE: CPT

## 2024-03-14 PROCEDURE — 99222 1ST HOSP IP/OBS MODERATE 55: CPT | Performed by: OTOLARYNGOLOGY

## 2024-03-14 PROCEDURE — 80048 BASIC METABOLIC PNL TOTAL CA: CPT | Performed by: INTERNAL MEDICINE

## 2024-03-14 PROCEDURE — 76536 US EXAM OF HEAD AND NECK: CPT

## 2024-03-14 PROCEDURE — 85025 COMPLETE CBC W/AUTO DIFF WBC: CPT | Performed by: STUDENT IN AN ORGANIZED HEALTH CARE EDUCATION/TRAINING PROGRAM

## 2024-03-14 PROCEDURE — 90935 HEMODIALYSIS ONE EVALUATION: CPT | Performed by: INTERNAL MEDICINE

## 2024-03-14 PROCEDURE — NC001 PR NO CHARGE: Performed by: INTERNAL MEDICINE

## 2024-03-14 PROCEDURE — 99232 SBSQ HOSP IP/OBS MODERATE 35: CPT | Performed by: STUDENT IN AN ORGANIZED HEALTH CARE EDUCATION/TRAINING PROGRAM

## 2024-03-14 RX ORDER — HYDROMORPHONE HCL/PF 1 MG/ML
1 SYRINGE (ML) INJECTION EVERY 4 HOURS PRN
Status: COMPLETED | OUTPATIENT
Start: 2024-03-14 | End: 2024-03-16

## 2024-03-14 RX ORDER — OXYCODONE HYDROCHLORIDE 5 MG/1
5 TABLET ORAL EVERY 6 HOURS PRN
Status: COMPLETED | OUTPATIENT
Start: 2024-03-14 | End: 2024-03-16

## 2024-03-14 RX ORDER — HYDROMORPHONE HYDROCHLORIDE 2 MG/1
2 TABLET ORAL EVERY 6 HOURS
Status: DISCONTINUED | OUTPATIENT
Start: 2024-03-14 | End: 2024-03-14

## 2024-03-14 RX ORDER — HYDROMORPHONE HYDROCHLORIDE 2 MG/1
2 TABLET ORAL EVERY 6 HOURS
Status: COMPLETED | OUTPATIENT
Start: 2024-03-14 | End: 2024-03-15

## 2024-03-14 RX ORDER — HYDROMORPHONE HCL IN WATER/PF 6 MG/30 ML
0.2 PATIENT CONTROLLED ANALGESIA SYRINGE INTRAVENOUS
Status: COMPLETED | OUTPATIENT
Start: 2024-03-14 | End: 2024-03-14

## 2024-03-14 RX ORDER — NALOXONE HYDROCHLORIDE 1 MG/ML
2 INJECTION INTRAMUSCULAR; INTRAVENOUS; SUBCUTANEOUS DAILY PRN
Status: DISCONTINUED | OUTPATIENT
Start: 2024-03-14 | End: 2024-03-14

## 2024-03-14 RX ADMIN — HYDROMORPHONE HYDROCHLORIDE 1 MG: 1 INJECTION, SOLUTION INTRAMUSCULAR; INTRAVENOUS; SUBCUTANEOUS at 22:26

## 2024-03-14 RX ADMIN — HEPARIN SODIUM 5000 UNITS: 5000 INJECTION INTRAVENOUS; SUBCUTANEOUS at 17:00

## 2024-03-14 RX ADMIN — FAMOTIDINE 10 MG: 20 TABLET, FILM COATED ORAL at 08:18

## 2024-03-14 RX ADMIN — ACETAMINOPHEN 975 MG: 325 TABLET ORAL at 08:18

## 2024-03-14 RX ADMIN — ATORVASTATIN CALCIUM 80 MG: 40 TABLET, FILM COATED ORAL at 16:59

## 2024-03-14 RX ADMIN — HEPARIN SODIUM 5000 UNITS: 5000 INJECTION INTRAVENOUS; SUBCUTANEOUS at 01:13

## 2024-03-14 RX ADMIN — GABAPENTIN 100 MG: 100 CAPSULE ORAL at 17:00

## 2024-03-14 RX ADMIN — INSULIN LISPRO 3 UNITS: 100 INJECTION, SOLUTION INTRAVENOUS; SUBCUTANEOUS at 17:02

## 2024-03-14 RX ADMIN — Medication 2.5 MG: at 15:04

## 2024-03-14 RX ADMIN — MUPIROCIN: 20 OINTMENT TOPICAL at 15:57

## 2024-03-14 RX ADMIN — ACETAMINOPHEN 975 MG: 325 TABLET ORAL at 01:13

## 2024-03-14 RX ADMIN — SEVELAMER HYDROCHLORIDE 800 MG: 800 TABLET, FILM COATED ORAL at 16:59

## 2024-03-14 RX ADMIN — HYDROMORPHONE HYDROCHLORIDE 0.2 MG: 0.2 INJECTION, SOLUTION INTRAMUSCULAR; INTRAVENOUS; SUBCUTANEOUS at 08:18

## 2024-03-14 RX ADMIN — GABAPENTIN 100 MG: 100 CAPSULE ORAL at 08:18

## 2024-03-14 RX ADMIN — SEVELAMER HYDROCHLORIDE 800 MG: 800 TABLET, FILM COATED ORAL at 12:01

## 2024-03-14 RX ADMIN — HYDROMORPHONE HYDROCHLORIDE 2 MG: 2 TABLET ORAL at 18:19

## 2024-03-14 RX ADMIN — HYDROMORPHONE HYDROCHLORIDE 0.2 MG: 0.2 INJECTION, SOLUTION INTRAMUSCULAR; INTRAVENOUS; SUBCUTANEOUS at 03:24

## 2024-03-14 RX ADMIN — VANCOMYCIN HYDROCHLORIDE 1000 MG: 1 INJECTION, SOLUTION INTRAVENOUS at 15:56

## 2024-03-14 RX ADMIN — METRONIDAZOLE 500 MG: 500 INJECTION, SOLUTION INTRAVENOUS at 01:14

## 2024-03-14 RX ADMIN — CEFEPIME HYDROCHLORIDE 1000 MG: 1 INJECTION, SOLUTION INTRAVENOUS at 15:05

## 2024-03-14 RX ADMIN — INSULIN GLARGINE 15 UNITS: 100 INJECTION, SOLUTION SUBCUTANEOUS at 22:22

## 2024-03-14 RX ADMIN — ACETAMINOPHEN 975 MG: 325 TABLET ORAL at 16:59

## 2024-03-14 RX ADMIN — Medication 250 MG: at 08:18

## 2024-03-14 RX ADMIN — MUPIROCIN 1 APPLICATION: 20 OINTMENT TOPICAL at 21:08

## 2024-03-14 RX ADMIN — METRONIDAZOLE 500 MG: 500 INJECTION, SOLUTION INTRAVENOUS at 10:45

## 2024-03-14 RX ADMIN — METRONIDAZOLE 500 MG: 500 INJECTION, SOLUTION INTRAVENOUS at 18:19

## 2024-03-14 RX ADMIN — GABAPENTIN 100 MG: 100 CAPSULE ORAL at 12:01

## 2024-03-14 RX ADMIN — Medication 250 MG: at 17:00

## 2024-03-14 RX ADMIN — MUPIROCIN: 20 OINTMENT TOPICAL at 12:36

## 2024-03-14 RX ADMIN — Medication 2.5 MG: at 05:48

## 2024-03-14 RX ADMIN — GABAPENTIN 100 MG: 100 CAPSULE ORAL at 21:08

## 2024-03-14 RX ADMIN — SEVELAMER HYDROCHLORIDE 800 MG: 800 TABLET, FILM COATED ORAL at 08:18

## 2024-03-14 RX ADMIN — HYDROMORPHONE HYDROCHLORIDE 2 MG: 2 TABLET ORAL at 12:01

## 2024-03-14 RX ADMIN — NIFEDIPINE 30 MG: 30 TABLET, EXTENDED RELEASE ORAL at 21:07

## 2024-03-14 RX ADMIN — HYDROMORPHONE HYDROCHLORIDE 1 MG: 1 INJECTION, SOLUTION INTRAMUSCULAR; INTRAVENOUS; SUBCUTANEOUS at 17:00

## 2024-03-14 RX ADMIN — INSULIN LISPRO 3 UNITS: 100 INJECTION, SOLUTION INTRAVENOUS; SUBCUTANEOUS at 08:19

## 2024-03-14 NOTE — ASSESSMENT & PLAN NOTE
Lab Results   Component Value Date    EGFR 6 03/18/2024    EGFR 6 03/16/2024    EGFR 6 03/14/2024    CREATININE 7.74 (H) 03/18/2024    CREATININE 8.20 (H) 03/16/2024    CREATININE 7.64 (H) 03/14/2024     Continue home sevelamer  Gets HD on TTS  Continue HD per nephrology

## 2024-03-14 NOTE — CONSULTS
Consultation - ENT   Naresh Carpio 63 y.o. male MRN: [id number]  Unit/Bed#: [address] [place] [address] Encounter: [id number]      Assessment/Plan     Assessment:  Right facial cellulitis - 2' folliculitis right cheek. Staph is most likely bacteria however given extent of the cellulitis and comorbidities, it is reasonable to continue the broad spectrum abx for another 24 hrs since he is improved clinically today.    Plan:  IV abx  Warm compress as tolerated  Keep right cheek area clean/dry  Apply bactroban ointment to right cheek lesion BID  Continue PO  No surgical plans at this time  If the pain gets worse or swelling increases, can consider ultrasound to the right cheek/neck to eval for fluid collection; at this time, I feel that the condition continues to remain as cellulitis more consolidated to the right face now and improving.    History of Present Illness   Physician Requesting Consult: Angie Bernard MD  Reason for Consult / Principal Problem: right facial cellulitis  HPI: Naresh Carpio is a 63 y.o. year old male who presented with acute worsening of right facial swelling involving the cheek/jaw/periorbital region (he showed me a picture when he first presented with eye closure from the edema). Onset from a pimple of the right cheek that he tried to express pus from. Shortly thereafter, the right face began to swell.  He reported severe pain overnight, better this am.  This is the first time he has had cellulitis involving the face, however, he did have toe infection/sepsis requiring amputation in the past.    Hx of DM and CKD/HD  IV vanc/cefepime, flagyl added yesterday for worsening symptoms to cover anaerobes.  Denied SOB, dysphagia/dysphonia    CT neck on admission personally reviewed  Right facial cellulitis extends to the right neck  No fluid collection  No obvious dental or salivary gland source  No sinusitis    Inpatient consult to ENT  Consult performed by: Meño Okeefe MD  Consult ordered by: Angie CARNES  MD Marco Antonio          Review of Systems  Gen: no fatigue, no fevers/chills  ENT: as per HPI  GI: no GERD;   Allergy/Immun: no allergies/asthma  Neuro: no headache  Endocrine: DM  Pulm: no SOB; no asthma; no cough  CV: no chest pain or palpitations  Derm: no rashes      Historical Information   Past Medical History:   Diagnosis Date    CKD     Diabetes mellitus (HCC)     Hyperlipidemia     Hypertension     Left toe amputee (HCC)     TIA (transient ischemic attack)      Past Surgical History:   Procedure Laterality Date    AMPUTATION Left     left foot resection 10 years ago dr tran    IR LOWER EXTREMITY ANGIOGRAM  08/29/2023    IR TEMPORARY DIALYSIS CATHETER PLACEMENT  08/25/2023    IR TUNNELED CENTRAL LINE REMOVAL  08/23/2023    IR TUNNELED DIALYSIS CATHETER PLACEMENT  01/27/2022    IR TUNNELED DIALYSIS CATHETER PLACEMENT  08/30/2023    [facility] AMPUTATION METATARSAL W/TOE SINGLE Right 8/31/2023    Procedure: RIGHT PARTIAL 1ST RAY RESECTION WITH  WOUND VAC APPLICATION;  Surgeon: Won Parmar DPM;  Location: [facility];  Service: Podiatry     Social History   Social History     Substance and Sexual Activity   Alcohol Use Not Currently    Alcohol/week: 0.0 standard drinks of alcohol    Comment: 0     Social History     Substance and Sexual Activity   Drug Use Never     Social History     Tobacco Use   Smoking Status Never    Passive exposure: Never   Smokeless Tobacco Never     Family History: non-contributory    Meds/Allergies   all current active meds have been reviewed  No current facility-administered medications on file prior to encounter.     Current Outpatient Medications on File Prior to Encounter   Medication Sig Dispense Refill    docusate sodium (COLACE) 100 mg capsule Take 1 capsule (100 mg total) by mouth 2 (two) times a day 60 capsule 0    NIFEdipine ER (ADALAT CC) 30 MG 24 hr tablet Take 30 mg by mouth 2 (two) times a day      sevelamer (RENAGEL) 800 mg tablet Take 1 tablet (800 mg total) by mouth 3  (three) times a day with meals  0    acetaminophen (TYLENOL) 325 mg tablet Take 3 tablets (975 mg total) by mouth every 8 (eight) hours (Patient not taking: Reported on 3/12/2024)  0    allopurinol (ZYLOPRIM) 100 mg tablet Take 100 mg by mouth daily      atorvastatin (LIPITOR) 80 mg tablet Take 80 mg by mouth daily      famotidine (PEPCID) 10 mg tablet Take 1 tablet (10 mg total) by mouth daily (Patient not taking: Reported on 3/12/2024) 20 tablet 0    insulin glargine (LANTUS) 100 units/mL subcutaneous injection Inject 15 Units under the skin daily at bedtime (Patient not taking: Reported on 3/12/2024) 10 mL 0    insulin lispro (HumaLOG) 100 units/mL injection Inject 5 Units under the skin 3 (three) times a day with meals (Patient not taking: Reported on 3/12/2024)  0    lidocaine (LIDODERM) 5 % Apply 1 patch topically over 12 hours daily Remove & Discard patch within 12 hours or as directed by MD Do not start before September 7, 2023. (Patient not taking: Reported on 3/12/2024)  0    methocarbamol (ROBAXIN) 500 mg tablet Take 1 tablet (500 mg total) by mouth every 12 (twelve) hours as needed for muscle spasms (back/hip pain and spasm) (Patient not taking: Reported on 3/12/2024) 30 tablet 0    naloxone (NARCAN) 4 mg/0.1 mL nasal spray Administer 1 spray into a nostril. If no response after 2-3 minutes, give another dose in the other nostril using a new spray. 1 each 0    polyethylene glycol (MIRALAX) 17 g packet Take 17 g by mouth daily Do not start before September 7, 2023.  0    senna-docusate sodium (SENOKOT S) 8.6-50 mg per tablet Take 2 tablets by mouth 2 (two) times a day (Patient not taking: Reported on 3/12/2024)  0       No Known Allergies      Objective     Vitals:    03/13/24 1520 03/13/24 1902 03/13/24 1918 03/13/24 2225   BP: 120/59 134/61 138/63 110/56   BP Location: Right arm Right arm Left arm Left arm   Pulse: 59 62 60 56   Resp: 18 18 18 19   Temp: (!) 97.3 °F (36.3 °C)  97.5 °F (36.4 °C) 97.5 °F  (36.4 °C)   TempSrc: Oral  Oral Oral   SpO2: 97% 99% 99% 97%       No intake or output data in the 24 hours ending 03/14/24 0729    Invasive Devices       Peripheral Intravenous Line  Duration             Peripheral IV 03/12/24 Distal;Right;Upper;Ventral (anterior) Arm 1 day              Hemodialysis Catheter  Duration             HD Permanent Double Catheter 196 days                    Physical Exam  Gen: NAD, awake/alert, no stridor  Voice: clear  Head: Normocephalic/atraumatic  Face: right cheek fullness/induration, minimal periorbital swelling; able to open eye fully  Ears: pinnae unremarkable; EAC patent; TM intact/translucent/without OME  Nose: no external abnormality; nares patent; septum midline; inferior turbinates pink; clear secretions; no pus; no polyps  Oral cavity: no lesions; tongue soft/mobile  Oropharynx: no lesions; tonsils without ulceration/exudate; soft palate/posterior wall unremarkable  Neck: right facial swelling/induration extends to right mandible/just under the mandible without mass or LAD  CN: III-XII grossly intact  Salivary glands: parotids/submandibular glands soft, nontender    Right cheek scab/crust was unroofed with 18g needle  No purulence  Tissue beneath is viable/bleeds throughout  Small amt of exudate underlying    Lab Results: CBC:   Lab Results   Component Value Date    WBC 11.49 (H) 03/14/2024    HGB 11.5 (L) 03/14/2024    HCT 35.4 (L) 03/14/2024    MCV 92 03/14/2024     03/14/2024    RBC 3.83 (L) 03/14/2024    MCH 30.0 03/14/2024    MCHC 32.5 03/14/2024    RDW 18.6 (H) 03/14/2024    MPV 10.1 03/14/2024    NRBC 0 03/14/2024     Imaging Studies: I have personally reviewed pertinent films in PACS  EKG, Pathology, and Other Studies:     Code Status: Level 1 - Full Code  Advance Directive and Living Will:      Power of :    POLST:

## 2024-03-14 NOTE — ASSESSMENT & PLAN NOTE
Patient presented with acute onset right facial pain, with associated redness, swelling    CT face: Extensive superficial and deep soft tissue cellulitis involving multiple spaces in the right greater than left face and neck extending inferiorly to the level of thyroid cartilage as described. The caudal portion of the inflammatory change is not completely imaged. Visualized airways are patent. No abscess. 2.  Artifact from prior dental fillings prevents reliable evaluation for dental caries. There is no periapical disease. 3.  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  Hx: Pseudomonas/MRSA-negative  ED: Vancomycin/Rocephin  ABX: Vancomycin/cefepime/Metronizadole have been discontinued and currently on cefazolin   Wcx: no poly noted  Initial U/S face: Cellulitis in the region of the right cheek without drainable organized fluid collection. However, there is a somewhat more focal area of subcutaneous fluid measuring approximately 2 cm, which could represent a developing collection   (3/18)repeat U/S: Showed cellulitis of the right cheek with underlying soft tissue edema and without focal drainable fluid collection identified. Enlarging, somewhat focal area of edema measuring up to 4 cm, previously 2 cm, possibly developing collection.  Per ENT previously, Warm compress as tolerated/ No surgical plans at this time  Apply bactroban ointment to right cheek lesion BID  Discussed with ENT again today to check imaging on media/updated ultrasound to see if any drainage is required or to continue with antibiotic alone  Curbside ID recs d/w :   continue current antibotics, long course for resolution  Consider Cefazolin w/ HD : 2mg/2mg/3mg for one additional week

## 2024-03-14 NOTE — ASSESSMENT & PLAN NOTE
Lab Results   Component Value Date    HGBA1C 5.4 03/12/2024     Decreased to 12 units of Lantus based on blood sugars.  It appears that patient does not take mealtime insulin and Humalog discontinued  Continue SSI  Carb controlled diet

## 2024-03-14 NOTE — PLAN OF CARE
Target UF Goal 3 L as tolerated. Patient dialyzing for 4 hours on 3 K bath for serum K of  3.8  per protocol. Treatment plan reviewed with Dr. Linton.   Problem: METABOLIC, FLUID AND ELECTROLYTES - ADULT  Goal: Electrolytes maintained within normal limits  Description: INTERVENTIONS:  - Monitor labs and assess patient for signs and symptoms of electrolyte imbalances  - Administer electrolyte replacement as ordered  - Monitor response to electrolyte replacements, including repeat lab results as appropriate  - Instruct patient on fluid and nutrition as appropriate  Outcome: Progressing  Goal: Fluid balance maintained  Description: INTERVENTIONS:  - Monitor labs   - Monitor I/O and WT  - Instruct patient on fluid and nutrition as appropriate  - Assess for signs & symptoms of volume excess or deficit  Outcome: Progressing   Post-Dialysis RN Treatment Note    Blood Pressure:  Pre 130/75 mm/Hg  Post 151/65 mmHg   EDW  108 kg    Weight:  Pre 110 kg   Post 107 kg   Mode of weight measurement: Standing Scale   Volume Removed  3000 ml    Treatment duration 240 minutes    NS given  No    Treatment shortened? No   Medications given during Rx None Reported   Estimated Kt/V  None Reported   Access type: Permacath/TDC   Access Issues: No    Report called to primary nurse   Yes Rosalee Taylor RN

## 2024-03-14 NOTE — PROGRESS NOTES
NEPHROLOGY HOSPITAL PROGRESS NOTE   Naresh Carpio 63 y.o. male MRN: [id number]  Unit/Bed#: [address] [place] [address] Encounter: [id number]  Reason for Consult: ESRD on HD     ASSESSMENT and PLAN:  63-year-old male with ESRD on HD admitted with facial cellulitis.  We are consulted for management of ESRD.    1.  ESRD on HD.  TTS at [facility].  EDW is 108 kg.  Access is permacath.  He will be dialyzed today.    HEMODIALYSIS PROCEDURE NOTE  The patient was seen and examined on hemodialysis.  Time: 4 hours  Sodium: 140 Blood flow: 400   Dialyzer: F160 Potassium: 3 Dialysate flow: 600   Access: R TDC Bicarbonate: 35 Ultrafiltration goal: 3 L   Medications on HD: None     2.  History of hypertension.  Blood pressure currently at goal.  Continue nifedipine 30 mg twice daily on nondialysis days and only at night on dialysis days.  We will ultrafilter to achieve estimated dry weight of 108 kg on dialysis today.     3.  Anemia in CKD.  Hemoglobin today 11.5 which is at goal.  Outpatient management with Mircera 150 mcg every 2 weeks.  No IV iron given infection.    4.  Secondary hyperparathyroidism of renal origin.  He takes Velphoro as outpatient.  For inpatient, continue Renvela 1 tablet 3 times daily with meals.  Continue low phosphorus diet.     5.  Hyperkalemia.  Serum potassium was 5.4 on admission.  This is resolved with dialysis.  We will use 3K bath on dialysis today.     6.  Hyponatremia.  Serum sodium 130.  This is due to lack of free water clearance in setting of ESRD.  This will be corrected with ultrafiltration with dialysis.  We will use sodium bath of 140 mEq on dialysis today     7.  Facial cellulitis.  Antibiotics per primary team.  Currently receiving IV vancomycin on dialysis days, IV cefepime 1000 mg daily and IV Flagyl 500 mg 3 times daily.    SUBJECTIVE / 24H INTERVAL HISTORY:  Patient was seen and examined on hemodialysis.  He denies dyspnea.  He denies leg swelling.  He complains of pain in his right  face.    REVIEW OF SYSTEMS:  Review of Systems   Constitutional:  Negative for chills and fever.   HENT:  Negative for ear pain and sore throat.    Eyes:  Negative for pain and visual disturbance.   Respiratory:  Negative for cough and shortness of breath.    Cardiovascular:  Negative for chest pain and palpitations.   Gastrointestinal:  Negative for abdominal pain and vomiting.   Genitourinary:  Negative for dysuria and hematuria.   Musculoskeletal:  Negative for arthralgias and back pain.   Skin:  Negative for color change and rash.   Neurological:  Negative for seizures and syncope.   All other systems reviewed and are negative.    OBJECTIVE:  Current Weight:    Vitals:    03/14/24 0720 03/14/24 0815 03/14/24 0827 03/14/24 0830   BP:  130/64 130/65 128/61   BP Location:  Right arm     Pulse:  61 63 60   Resp:  18 18 18   Temp:  (!) 97 °F (36.1 °C)     TempSrc:  Tympanic     SpO2: 97%          Intake/Output Summary (Last 24 hours) at 3/14/2024 0847  Last data filed at 3/14/2024 0827  Gross per 24 hour   Intake 200 ml   Output --   Net 200 ml     Physical Exam  Vitals and nursing note reviewed.   Constitutional:       General: He is not in acute distress.     Appearance: He is well-developed.   HENT:      Head: Normocephalic and atraumatic.   Eyes:      Conjunctiva/sclera: Conjunctivae normal.   Cardiovascular:      Rate and Rhythm: Normal rate and regular rhythm.      Heart sounds: No murmur heard.     Comments: Right chest wall permacath currently in use for hemodialysis  Pulmonary:      Effort: Pulmonary effort is normal. No respiratory distress.      Breath sounds: Normal breath sounds.   Abdominal:      Palpations: Abdomen is soft.      Tenderness: There is no abdominal tenderness.   Musculoskeletal:         General: No swelling.      Cervical back: Neck supple.   Skin:     General: Skin is warm and dry.      Capillary Refill: Capillary refill takes less than 2 seconds.      Comments: Right cheek erythema    Neurological:      Mental Status: He is alert.   Psychiatric:         Mood and Affect: Mood normal.       Medications:    Current Facility-Administered Medications:     acetaminophen (TYLENOL) tablet 975 mg, 975 mg, Oral, Q8H [facility], GRANT Rich, 975 mg at 03/14/24 0818    atorvastatin (LIPITOR) tablet 80 mg, 80 mg, Oral, Daily With Dinner, Angie Bernard MD, 80 mg at 03/13/24 1604    cefepime (MAXIPIME) IVPB (premix in dextrose) 1,000 mg 50 mL, 1,000 mg, Intravenous, Q24H, Angie Bernard MD, Last Rate: 100 mL/hr at 03/13/24 1521, 1,000 mg at 03/13/24 1521    docusate sodium (COLACE) capsule 100 mg, 100 mg, Oral, BID, Angie Bernard MD, 100 mg at 03/12/24 1748    famotidine (PEPCID) tablet 10 mg, 10 mg, Oral, Daily, Angie Bernard MD, 10 mg at 03/14/24 0818    gabapentin (NEURONTIN) capsule 100 mg, 100 mg, Oral, 4x Daily, Angie Bernard MD, 100 mg at 03/14/24 0818    heparin (porcine) subcutaneous injection 5,000 Units, 5,000 Units, Subcutaneous, Q8H [facility], Angie Bernard MD, 5,000 Units at 03/14/24 0113    insulin glargine (LANTUS) subcutaneous injection 15 Units 0.15 mL, 15 Units, Subcutaneous, HS, Angie Bernard MD, 15 Units at 03/13/24 2126    insulin lispro (HumALOG/ADMELOG) 100 units/mL subcutaneous injection 1-6 Units, 1-6 Units, Subcutaneous, TID AC, 2 Units at 03/13/24 1606 **AND** Fingerstick Glucose (POCT), , , TID AC, Angie Bernard MD    insulin lispro (HumALOG/ADMELOG) 100 units/mL subcutaneous injection 1-6 Units, 1-6 Units, Subcutaneous, HS, Niharika Russell PA-C, 1 Units at 03/13/24 2127    insulin lispro (HumALOG/ADMELOG) 100 units/mL subcutaneous injection 3 Units, 3 Units, Subcutaneous, TID With Meals, Angie Bernard MD, 3 Units at 03/14/24 0819    metroNIDAZOLE (FLAGYL) IVPB (premix) 500 mg 100 mL, 500 mg, Intravenous, Q8H, Angie Bernard MD, Last Rate: 200 mL/hr at 03/14/24 0114, 500 mg at 03/14/24 0114    NIFEdipine (PROCARDIA XL) 24 hr tablet 30 mg, 30 mg, Oral, 2 times per  "day on Sunday Monday Wednesday Friday, Kalli Mcbride PA-C, 30 mg at 03/13/24 1711    NIFEdipine (PROCARDIA XL) 24 hr tablet 30 mg, 30 mg, Oral, Once per day on Tuesday Thursday Saturday, Kalli Mcbride PA-C, 30 mg at 03/12/24 1748    ondansetron (ZOFRAN) injection 4 mg, 4 mg, Intravenous, Q6H PRN, Angie Bernard MD    oxyCODONE (ROXICODONE) split tablet 2.5 mg, 2.5 mg, Oral, Q6H PRN, GRANT Rich, 2.5 mg at 03/14/24 0548    polyethylene glycol (MIRALAX) packet 17 g, 17 g, Oral, Daily, Angie Bernard MD, 17 g at 03/12/24 1748    saccharomyces boulardii (FLORASTOR) capsule 250 mg, 250 mg, Oral, BID, Angie Bernard MD, 250 mg at 03/14/24 0818    sevelamer (RENAGEL) tablet 800 mg, 800 mg, Oral, TID With Meals, Angie Bernard MD, 800 mg at 03/14/24 0818    vancomycin (VANCOCIN) IVPB (premix in dextrose) 1,000 mg 200 mL, 10 mg/kg (Adjusted), Intravenous, Once per day on Tuesday Thursday Saturday, Angie Bernard MD    Laboratory Results:  Results from last 7 days   Lab Units 03/14/24  0550 03/13/24  0505 03/12/24  0939   WBC Thousand/uL 11.49*  --  13.53*   HEMOGLOBIN g/dL 11.5*  --  10.8*   HEMATOCRIT % 35.4*  --  33.6*   PLATELETS Thousands/uL 237  --  162   POTASSIUM mmol/L 3.8 3.8 5.4*   CHLORIDE mmol/L 94* 94* 94*   CO2 mmol/L 22 27 25   BUN mg/dL 68* 52* 74*   CREATININE mg/dL 7.64* 6.09* 9.09*   CALCIUM mg/dL 8.4 8.5 8.5   MAGNESIUM mg/dL  --   --  2.2       Portions of the record may have been created with voice recognition software. Occasional wrong word or \"sound a like\" substitutions may have occurred due to the inherent limitations of voice recognition software. Read the chart carefully and recognize, using context, where substitutions have occurred. If you have any questions, please contact the dictating provider.    "

## 2024-03-14 NOTE — PROGRESS NOTES
Naresh Carpio is a 63 y.o. male who is currently ordered Vancomycin IV with management by the Pharmacy Consult service.  Relevant clinical data and objective / subjective history reviewed.  Vancomycin Assessment:  Indication and Goal AUC/Trough: Soft tissue (goal -600, trough >10)  Clinical Status: stable  Micro:     Renal Function:  SCr: 7.64 mg/dL  CrCl: 12.6 mL/min  Renal replacement: HD  Days of Therapy: 3  Current Dose: 2000mg IV once (LD)  Vancomycin Plan:  New Dosinmg IV pst HD days (--SAT)  Estimated AUC: N/A  Estimated Trough: N/A  Next Level: 24 @ 0600  Renal Function Monitoring: Daily BMP and UOP  Pharmacy will continue to follow closely for s/sx of nephrotoxicity, infusion reactions and appropriateness of therapy.  BMP and CBC will be ordered per protocol. We will continue to follow the patient’s culture results and clinical progress daily.    Virginie Joseph, Pharmacist

## 2024-03-14 NOTE — PLAN OF CARE
Problem: Potential for Falls  Goal: Patient will remain free of falls  Description: INTERVENTIONS:  - Educate patient/family on patient safety including physical limitations  - Instruct patient to call for assistance with activity   - Consult OT/PT to assist with strengthening/mobility   - Keep Call bell within reach  - Keep bed low and locked with side rails adjusted as appropriate  - Keep care items and personal belongings within reach  - Initiate and maintain comfort rounds  - Make Fall Risk Sign visible to staff  Problem: METABOLIC, FLUID AND ELECTROLYTES - ADULT  Goal: Electrolytes maintained within normal limits  Description: INTERVENTIONS:  - Monitor labs and assess patient for signs and symptoms of electrolyte imbalances  - Administer electrolyte replacement as ordered  - Monitor response to electrolyte replacements, including repeat lab results as appropriate  - Instruct patient on fluid and nutrition as appropriate  Outcome: Progressing  Goal: Fluid balance maintained  Description: INTERVENTIONS:  - Monitor labs   - Monitor I/O and WT  - Instruct patient on fluid and nutrition as appropriate  - Assess for signs & symptoms of volume excess or deficit  Outcome: Progressing  Goal: Glucose maintained within target range  Description: INTERVENTIONS:  - Monitor Blood Glucose as ordered  - Assess for signs and symptoms of hyperglycemia and hypoglycemia  - Administer ordered medications to maintain glucose within target range  - Assess nutritional intake and initiate nutrition service referral as needed  Outcome: Progressing     Problem: INFECTION - ADULT  Goal: Absence or prevention of progression during hospitalization  Description: INTERVENTIONS:  - Assess and monitor for signs and symptoms of infection  - Monitor lab/diagnostic results  - Monitor all insertion sites, i.e. indwelling lines, tubes, and drains  - Monitor endotracheal if appropriate and nasal secretions for changes in amount and color  -  [place] appropriate cooling/warming therapies per order  - Administer medications as ordered  - Instruct and encourage patient and family to use good hand hygiene technique  - Identify and instruct in appropriate isolation precautions for identified infection/condition  Outcome: Progressing  Goal: Absence of fever/infection during neutropenic period  Description: INTERVENTIONS:  - Monitor WBC    Outcome: Progressing     Problem: DISCHARGE PLANNING  Goal: Discharge to home or other facility with appropriate resources  Description: INTERVENTIONS:  - Identify barriers to discharge w/patient and caregiver  - Arrange for needed discharge resources and transportation as appropriate  - Identify discharge learning needs (meds, wound care, etc.)  - Arrange for interpretive services to assist at discharge as needed  - Refer to Case Management Department for coordinating discharge planning if the patient needs post-hospital services based on physician/advanced practitioner order or complex needs related to functional status, cognitive ability, or social support system  Outcome: Progressing     Problem: Knowledge Deficit  Goal: Patient/family/caregiver demonstrates understanding of disease process, treatment plan, medications, and discharge instructions  Description: Complete learning assessment and assess knowledge base.  Interventions:  - Provide teaching at level of understanding  - Provide teaching via preferred learning methods  Outcome: Progressing     - Apply yellow socks and bracelet for high fall risk patients  - Consider moving patient to room near nurses station  Outcome: Progressing

## 2024-03-14 NOTE — PROGRESS NOTES
[facility]  Progress Note  Name: Naresh Carpio I  MRN: [id number]  Unit/Bed#: [address] [place] [address] Date of Admission: 3/12/2024   Date of Service: 3/14/2024 I Hospital Day: 2    Assessment/Plan   * Facial cellulitis  Assessment & Plan  Patient presents with acute onset right facial pain, with associated redness, swelling    CT face:Extensive superficial and deep soft tissue cellulitis involving multiple spaces in the right greater than left face and neck extending inferiorly to the level of thyroid cartilage as described. The caudal portion of the inflammatory change is not completely imaged. Visualized airways are patent. No abscess. 2.  Artifact from prior dental fillings prevents reliable evaluation for dental caries. There is no periapical disease. 3.  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  Hx: Pseudomonas/MRSA-negative  ED: Vancomycin/Rocephin  ABX: Vancomycin/cefepime/metronidazole  Wcx: follow cx, adjust antibiotics as needed  Tylenol scheduled with Dilaudid scheduled and as needed for breakthrough, home gabapentin  Ultrasound pending soft tissue cheek  D/W ENT recs:   Add extended oral antibiotic coverage Flagyl  No need for repeat CT  Warm compress as tolerated/ No surgical plans at this time  Apply bactroban ointment to right cheek lesion BID  If the pain gets worse or swelling increases, can consider ultrasound to the right cheek/neck to eval for fluid collection; at this time, I feel that the condition continues to remain as cellulitis more consolidated to the right face now and improving.  Consider ID if worsening            Electrolyte abnormality  Assessment & Plan  NA-130   K-5.4  Cl-94  We will replenish and monitor  D/W with nephro recs: HD planned    Type 2 diabetes mellitus, without long-term current use of insulin (HCC)  Assessment & Plan  Lab Results   Component Value Date    HGBA1C 5.4 03/12/2024     (P)  [id number]  Repeat A1c-5.4%  Continue home Lantus 15 units subcu  SSI in place  Will adjust basal and prandial  Carb controlled diet    ESRD (end stage renal disease) ([facility])  Assessment & Plan  Lab Results   Component Value Date    EGFR 6 03/14/2024    EGFR 8 03/13/2024    EGFR 5 03/12/2024    CREATININE 7.64 (H) 03/14/2024    CREATININE 6.09 (H) 03/13/2024    CREATININE 9.09 (H) 03/12/2024     Continue home sevelamer  Avoid nephrotoxic's  Nephro following for HD management    Medical history non-contributory  Assessment & Plan  Chronic conditions, controlled with home medication    GERD-continue home famotidine 10 mg  HTN-continue home nifedipine 30 mg twice daily  HLD-continue home statin 80 mg  Gout-reevaluate home allopurinol  Neuropathy- gabapentin         VTE Pharmacologic Prophylaxis:   Moderate Risk (Score 3-4) - Pharmacological DVT Prophylaxis Ordered: heparin.    Mobility:   Basic Mobility Inpatient Raw Score: 24  JH-HLM Goal: 8: Walk 250 feet or more  JH-HLM Achieved: 6: Walk 10 steps or more  HLM Goal NOT achieved. Continue with multidisciplinary rounding and encourage appropriate mobility to improve upon HLM goals.    Patient Centered Rounds: I evaluated the patient without nursing staff present due to alternate clinical responsibilities    Discussions with Specialists or Other Care Team Provider: ENT    Education and Discussions with Family / Patient: Patient declined call to .     Total Time Spent on Date of Encounter in care of patient: 40 mins. This time was spent on one or more of the following: performing physical exam; counseling and coordination of care; obtaining or reviewing history; documenting in the medical record; reviewing/ordering tests, medications or procedures; communicating with other healthcare professionals and discussing with patient's family/caregivers.    Current Length of Stay: 2 day(s)  Current Patient Status: Inpatient   Certification Statement: The  patient will continue to require additional inpatient hospital stay due to IV antibiotics and specialist recs  Discharge Plan: Anticipate discharge tomorrow to home.    Code Status: Level 1 - Full Code    Subjective:   Patient seen while obtaining HD, reported cheek pain 9/10, last night was extremely bad pain.  Reported pain is worse in cheek then when he fractured his spine.  Reported pain is constant dull achy with random electric like shocks in cheek not down jaw.  Patient used warm compress overnight.  Patient informed of ENTs plan of care.    Addendum: Nursing reported unresolved pain, 10 out of 10    Objective:     Vitals:   Temp (24hrs), Av.6 °F (36.4 °C), Min:97 °F (36.1 °C), Max:98.4 °F (36.9 °C)    Temp:  [97 °F (36.1 °C)-98.4 °F (36.9 °C)] 98.4 °F (36.9 °C)  HR:  [56-69] 69  Resp:  [18-19] 18  BP: (110-151)/(56-72) 151/65  SpO2:  [97 %-99 %] 97 %  There is no height or weight on file to calculate BMI.     Input and Output Summary (last 24 hours):     Intake/Output Summary (Last 24 hours) at 3/14/2024 1637  Last data filed at 3/14/2024 1230  Gross per 24 hour   Intake 600 ml   Output 3600 ml   Net -3000 ml       Physical Exam:   Physical Exam  Vitals and nursing note reviewed.   Constitutional:       General: He is not in acute distress.     Appearance: He is well-developed.   HENT:      Head: Normocephalic and atraumatic.      Comments: Right cheek erythema, tenderness, scab, fluctuance  Eyes:      Conjunctiva/sclera: Conjunctivae normal.   Cardiovascular:      Rate and Rhythm: Normal rate and regular rhythm.      Heart sounds: No murmur heard.  Pulmonary:      Effort: Pulmonary effort is normal. No respiratory distress.      Breath sounds: Normal breath sounds.   Chest:      Comments: Right chest wall cath  Abdominal:      Palpations: Abdomen is soft.      Tenderness: There is no abdominal tenderness.   Musculoskeletal:         General: No swelling.      Cervical back: Neck supple.   Skin:      General: Skin is warm and dry.      Capillary Refill: Capillary refill takes less than 2 seconds.   Neurological:      Mental Status: He is alert.   Psychiatric:         Mood and Affect: Mood normal.          Additional Data:     Labs:  Results from last 7 days   Lab Units 24  0550   WBC Thousand/uL 11.49*   HEMOGLOBIN g/dL 11.5*   HEMATOCRIT % 35.4*   PLATELETS Thousands/uL 237   NEUTROS PCT % 77*   LYMPHS PCT % 13*   MONOS PCT % 7   EOS PCT % 2     Results from last 7 days   Lab Units 24  0550 24  0505 24  0939   SODIUM mmol/L 130*   < > 130*   POTASSIUM mmol/L 3.8   < > 5.4*   CHLORIDE mmol/L 94*   < > 94*   CO2 mmol/L 22   < > 25   BUN mg/dL 68*   < > 74*   CREATININE mg/dL 7.64*   < > 9.09*   ANION GAP mmol/L 14*   < > 11   CALCIUM mg/dL 8.4   < > 8.5   ALBUMIN g/dL  --   --  3.6   TOTAL BILIRUBIN mg/dL  --   --  0.42   ALK PHOS U/L  --   --  94   ALT U/L  --   --  25   AST U/L  --   --  44*   GLUCOSE RANDOM mg/dL 128   < > 110    < > = values in this interval not displayed.         Results from last 7 days   Lab Units 24  1548 24  1046 24  0701 24  2106 24  1551 24  1051 24  0703 24  2024 24  1606   POC GLUCOSE mg/dl 146* 99 137 168* 208* 192* 160* 323* 175*     Results from last 7 days   Lab Units 24  0939   HEMOGLOBIN A1C % 5.4     Results from last 7 days   Lab Units 24  0939   LACTIC ACID mmol/L 0.7       Lines/Drains:  Invasive Devices       Peripheral Intravenous Line  Duration             Peripheral IV 24 Distal;Right;Upper;Ventral (anterior) Arm 2 days              Hemodialysis Catheter  Duration             HD Permanent Double Catheter 197 days                      Telemetry:  Telemetry Orders (From admission, onward)               24 Hour Telemetry Monitoring  Continuous x 24 Hours ([place])           Question:  Reason for 24 Hour Telemetry  Answer:  Metabolic/electrolyte disturbance with high  probability of dysrhythmia. K level <3 or >6 OR KCL infusion >10mEq/hr                            Imaging: Reviewed radiology reports from this admission including: Facial CT    Recent Cultures (last 7 days):   Results from last 7 days   Lab Units 03/12/24  1009   BLOOD CULTURE  No Growth at 48 hrs.  No Growth at 48 hrs.       Last 24 Hours Medication List:   Current Facility-Administered Medications   Medication Dose Route Frequency Provider Last Rate    acetaminophen  975 mg Oral Q8H [facility] GRANT Rich      atorvastatin  80 mg Oral Daily With Dinner Angie Bernard MD      cefepime  1,000 mg Intravenous Q24H Angie Bernard MD 1,000 mg (03/14/24 1505)    docusate sodium  100 mg Oral BID Angie Bernard MD      famotidine  10 mg Oral Daily Angie Bernard MD      gabapentin  100 mg Oral 4x Daily Angie Bernard MD      heparin (porcine)  5,000 Units Subcutaneous Q8H [facility] Angie Bernard MD      HYDROmorphone  1 mg Intravenous Q4H PRN Angie Bernard MD      HYDROmorphone  2 mg Oral Q6H Angie Bernard MD      insulin glargine  15 Units Subcutaneous HS Angie Bernard MD      insulin lispro  1-6 Units Subcutaneous TID AC Angie Bernard MD      insulin lispro  1-6 Units Subcutaneous HS Niharika Russell PA-C      insulin lispro  3 Units Subcutaneous TID With Meals Angie Bernard MD      metroNIDAZOLE  500 mg Intravenous Q8H Angie Bernard  mg (03/14/24 1045)    mupirocin   Topical TID Angie Bernard MD      naloxone  2 mg Nasal Daily PRN Angie Bernard MD      NIFEdipine  30 mg Oral 2 times per day on Sunday Monday Wednesday Friday Kalli Mcbride PA-C      NIFEdipine  30 mg Oral Once per day on Tuesday Thursday Saturday Kalli Mcbride PA-C      ondansetron  4 mg Intravenous Q6H PRN Angie Bernard MD      oxyCODONE  5 mg Oral Q6H PRN Angie Bernard MD      polyethylene glycol  17 g Oral Daily Angie Bernard MD      saccharomyces boulardii  250 mg Oral BID Angie Bernard MD      sevelamer  800  mg Oral TID With Meals Angie Bernard MD      vancomycin  10 mg/kg (Adjusted) Intravenous Once per day on Tuesday Thursday Saturday Angie Bernard MD 1,000 mg (03/14/24 5305)        Today, Patient Was Seen By: Angie Bernard MD    **Please Note: This note may have been constructed using a voice recognition system.**

## 2024-03-14 NOTE — PLAN OF CARE
Problem: Potential for Falls  Goal: Patient will remain free of falls  Description: INTERVENTIONS:  - Educate patient/family on patient safety including physical limitations  - Instruct patient to call for assistance with activity   - Consult OT/PT to assist with strengthening/mobility   - Keep Call bell within reach  - Keep bed low and locked with side rails adjusted as appropriate  - Keep care items and personal belongings within reach  - Initiate and maintain comfort rounds  - Make Fall Risk Sign visible to staff  - Offer Toileting every 2 Hours, in advance of need    - Obtain necessary fall risk management equipment: socks  - Apply yellow socks and bracelet for high fall risk patients  - Consider moving patient to room near nurses station  Outcome: Progressing     Problem: METABOLIC, FLUID AND ELECTROLYTES - ADULT  Goal: Electrolytes maintained within normal limits  Description: INTERVENTIONS:  - Monitor labs and assess patient for signs and symptoms of electrolyte imbalances  - Administer electrolyte replacement as ordered  - Monitor response to electrolyte replacements, including repeat lab results as appropriate  - Instruct patient on fluid and nutrition as appropriate  Outcome: Progressing  Goal: Fluid balance maintained  Description: INTERVENTIONS:  - Monitor labs   - Monitor I/O and WT  - Instruct patient on fluid and nutrition as appropriate  - Assess for signs & symptoms of volume excess or deficit  Outcome: Progressing  Goal: Glucose maintained within target range  Description: INTERVENTIONS:  - Monitor Blood Glucose as ordered  - Assess for signs and symptoms of hyperglycemia and hypoglycemia  - Administer ordered medications to maintain glucose within target range  - Assess nutritional intake and initiate nutrition service referral as needed  Outcome: Progressing     Problem: PAIN - ADULT  Goal: Verbalizes/displays adequate comfort level or baseline comfort level  Description: Interventions:  -  Encourage patient to monitor pain and request assistance  - Assess pain using appropriate pain scale  - Administer analgesics based on type and severity of pain and evaluate response  - Implement non-pharmacological measures as appropriate and evaluate response  - Consider cultural and social influences on pain and pain management  - Notify physician/advanced practitioner if interventions unsuccessful or patient reports new pain  Outcome: Progressing     Problem: INFECTION - ADULT  Goal: Absence or prevention of progression during hospitalization  Description: INTERVENTIONS:  - Assess and monitor for signs and symptoms of infection  - Monitor lab/diagnostic results  - Monitor all insertion sites, i.e. indwelling lines, tubes, and drains  - Monitor endotracheal if appropriate and nasal secretions for changes in amount and color  - [place] appropriate cooling/warming therapies per order  - Administer medications as ordered  - Instruct and encourage patient and family to use good hand hygiene technique  - Identify and instruct in appropriate isolation precautions for identified infection/condition  Outcome: Progressing  Goal: Absence of fever/infection during neutropenic period  Description: INTERVENTIONS:  - Monitor WBC    Outcome: Progressing     Problem: SAFETY ADULT  Goal: Patient will remain free of falls  Description: INTERVENTIONS:  - Educate patient/family on patient safety including physical limitations  - Instruct patient to call for assistance with activity   - Consult OT/PT to assist with strengthening/mobility   - Keep Call bell within reach  - Keep bed low and locked with side rails adjusted as appropriate  - Keep care items and personal belongings within reach  - Initiate and maintain comfort rounds  - Make Fall Risk Sign visible to staff  - Offer Toileting every 2 Hours, in advance of need    - Obtain necessary fall risk management equipment: socks  - Apply yellow socks and bracelet for high fall risk  patients  - Consider moving patient to room near nurses station  Outcome: Progressing  Goal: Maintain or return to baseline ADL function  Description: INTERVENTIONS:  -  Assess patient's ability to carry out ADLs; assess patient's baseline for ADL function and identify physical deficits which impact ability to perform ADLs (bathing, care of mouth/teeth, toileting, grooming, dressing, etc.)  - Assess/evaluate cause of self-care deficits   - Assess range of motion  - Assess patient's mobility; develop plan if impaired  - Assess patient's need for assistive devices and provide as appropriate  - Encourage maximum independence but intervene and supervise when necessary  - Involve family in performance of ADLs  - Assess for home care needs following discharge   - Consider OT consult to assist with ADL evaluation and planning for discharge  - Provide patient education as appropriate  Outcome: Progressing  Goal: Maintains/Returns to pre admission functional level  Description: INTERVENTIONS:  - Perform AM-PAC 6 Click Basic Mobility/ Daily Activity assessment daily.  - Set and communicate daily mobility goal to care team and patient/family/caregiver.   - Collaborate with rehabilitation services on mobility goals if consulted  - Perform Range of Motion 3 times a day.  - Reposition patient every 2 hours.  - Dangle patient 3 times a day  - Stand patient 3 times a day  - Ambulate patient 3 times a day  - Out of bed to chair 3 times a day   - Out of bed for meals 3 times a day  - Out of bed for toileting  - Record patient progress and toleration of activity level   Outcome: Progressing     Problem: DISCHARGE PLANNING  Goal: Discharge to home or other facility with appropriate resources  Description: INTERVENTIONS:  - Identify barriers to discharge w/patient and caregiver  - Arrange for needed discharge resources and transportation as appropriate  - Identify discharge learning needs (meds, wound care, etc.)  - Arrange for  interpretive services to assist at discharge as needed  - Refer to Case Management Department for coordinating discharge planning if the patient needs post-hospital services based on physician/advanced practitioner order or complex needs related to functional status, cognitive ability, or social support system  Outcome: Progressing     Problem: Knowledge Deficit  Goal: Patient/family/caregiver demonstrates understanding of disease process, treatment plan, medications, and discharge instructions  Description: Complete learning assessment and assess knowledge base.  Interventions:  - Provide teaching at level of understanding  - Provide teaching via preferred learning methods  Outcome: Progressing

## 2024-03-14 NOTE — PLAN OF CARE
Problem: Potential for Falls  Goal: Patient will remain free of falls  Description: INTERVENTIONS:  - Educate patient/family on patient safety including physical limitations  - Instruct patient to call for assistance with activity   - Consult OT/PT to assist with strengthening/mobility   - Keep Call bell within reach  - Keep bed low and locked with side rails adjusted as appropriate  - Keep care items and personal belongings within reach  - Initiate and maintain comfort rounds  - Make Fall Risk Sign visible to staff  - Offer Toileting every 2 Hours, in advance of need  - Initiate/Maintain bed alarm  - Obtain necessary fall risk management equipment: yellow socks, yellow bracelet   Problem: METABOLIC, FLUID AND ELECTROLYTES - ADULT  Goal: Electrolytes maintained within normal limits  Description: INTERVENTIONS:  - Monitor labs and assess patient for signs and symptoms of electrolyte imbalances  - Administer electrolyte replacement as ordered  - Monitor response to electrolyte replacements, including repeat lab results as appropriate  - Instruct patient on fluid and nutrition as appropriate  Outcome: Progressing     Problem: PAIN - ADULT  Goal: Verbalizes/displays adequate comfort level or baseline comfort level  Description: Interventions:  - Encourage patient to monitor pain and request assistance  - Assess pain using appropriate pain scale  - Administer analgesics based on type and severity of pain and evaluate response  - Implement non-pharmacological measures as appropriate and evaluate response  - Consider cultural and social influences on pain and pain management  - Notify physician/advanced practitioner if interventions unsuccessful or patient reports new pain  Outcome: Progressing     Problem: INFECTION - ADULT  Goal: Absence or prevention of progression during hospitalization  Description: INTERVENTIONS:  - Assess and monitor for signs and symptoms of infection  - Monitor lab/diagnostic results  - Monitor  all insertion sites, i.e. indwelling lines, tubes, and drains  - Monitor endotracheal if appropriate and nasal secretions for changes in amount and color  - [place] appropriate cooling/warming therapies per order  - Administer medications as ordered  - Instruct and encourage patient and family to use good hand hygiene technique  - Identify and instruct in appropriate isolation precautions for identified infection/condition  Outcome: Progressing          Problem: DISCHARGE PLANNING  Goal: Discharge to home or other facility with appropriate resources  Description: INTERVENTIONS:  - Identify barriers to discharge w/patient and caregiver  - Arrange for needed discharge resources and transportation as appropriate  - Identify discharge learning needs (meds, wound care, etc.)  - Arrange for interpretive services to assist at discharge as needed  - Refer to Case Management Department for coordinating discharge planning if the patient needs post-hospital services based on physician/advanced practitioner order or complex needs related to functional status, cognitive ability, or social support system  Outcome: Progressing     Problem: Knowledge Deficit  Goal: Patient/family/caregiver demonstrates understanding of disease process, treatment plan, medications, and discharge instructions  Description: Complete learning assessment and assess knowledge base.  Interventions:  - Provide teaching at level of understanding  - Provide teaching via preferred learning methods  Outcome: Progressing

## 2024-03-15 LAB
BASOPHILS # BLD AUTO: 0.04 THOUSANDS/ÂΜL (ref 0–0.1)
BASOPHILS NFR BLD AUTO: 0 % (ref 0–1)
EOSINOPHIL # BLD AUTO: 0.28 THOUSAND/ÂΜL (ref 0–0.61)
EOSINOPHIL NFR BLD AUTO: 3 % (ref 0–6)
ERYTHROCYTE [DISTWIDTH] IN BLOOD BY AUTOMATED COUNT: 18.5 % (ref 11.6–15.1)
GLUCOSE SERPL-MCNC: 118 MG/DL (ref 65–140)
GLUCOSE SERPL-MCNC: 146 MG/DL (ref 65–140)
GLUCOSE SERPL-MCNC: 86 MG/DL (ref 65–140)
GLUCOSE SERPL-MCNC: 91 MG/DL (ref 65–140)
HCT VFR BLD AUTO: 34.7 % (ref 36.5–49.3)
HGB BLD-MCNC: 11 G/DL (ref 12–17)
IMM GRANULOCYTES # BLD AUTO: 0.06 THOUSAND/UL (ref 0–0.2)
IMM GRANULOCYTES NFR BLD AUTO: 1 % (ref 0–2)
LYMPHOCYTES # BLD AUTO: 1.2 THOUSANDS/ÂΜL (ref 0.6–4.47)
LYMPHOCYTES NFR BLD AUTO: 12 % (ref 14–44)
MCH RBC QN AUTO: 29.9 PG (ref 26.8–34.3)
MCHC RBC AUTO-ENTMCNC: 31.7 G/DL (ref 31.4–37.4)
MCV RBC AUTO: 94 FL (ref 82–98)
MONOCYTES # BLD AUTO: 1.16 THOUSAND/ÂΜL (ref 0.17–1.22)
MONOCYTES NFR BLD AUTO: 12 % (ref 4–12)
NEUTROPHILS # BLD AUTO: 7.17 THOUSANDS/ÂΜL (ref 1.85–7.62)
NEUTS SEG NFR BLD AUTO: 72 % (ref 43–75)
NRBC BLD AUTO-RTO: 0 /100 WBCS
PLATELET # BLD AUTO: 223 THOUSANDS/UL (ref 149–390)
PMV BLD AUTO: 10.2 FL (ref 8.9–12.7)
RBC # BLD AUTO: 3.68 MILLION/UL (ref 3.88–5.62)
WBC # BLD AUTO: 9.91 THOUSAND/UL (ref 4.31–10.16)

## 2024-03-15 PROCEDURE — 99232 SBSQ HOSP IP/OBS MODERATE 35: CPT | Performed by: STUDENT IN AN ORGANIZED HEALTH CARE EDUCATION/TRAINING PROGRAM

## 2024-03-15 PROCEDURE — 85025 COMPLETE CBC W/AUTO DIFF WBC: CPT | Performed by: STUDENT IN AN ORGANIZED HEALTH CARE EDUCATION/TRAINING PROGRAM

## 2024-03-15 PROCEDURE — 82948 REAGENT STRIP/BLOOD GLUCOSE: CPT

## 2024-03-15 RX ORDER — GABAPENTIN 300 MG/1
300 CAPSULE ORAL 3 TIMES DAILY
Status: DISCONTINUED | OUTPATIENT
Start: 2024-03-15 | End: 2024-03-22 | Stop reason: HOSPADM

## 2024-03-15 RX ORDER — METRONIDAZOLE 500 MG/1
500 TABLET ORAL EVERY 8 HOURS SCHEDULED
Status: DISCONTINUED | OUTPATIENT
Start: 2024-03-15 | End: 2024-03-19

## 2024-03-15 RX ORDER — HYDROMORPHONE HYDROCHLORIDE 2 MG/1
2 TABLET ORAL EVERY 6 HOURS
Status: DISPENSED | OUTPATIENT
Start: 2024-03-15 | End: 2024-03-16

## 2024-03-15 RX ADMIN — HYDROMORPHONE HYDROCHLORIDE 2 MG: 2 TABLET ORAL at 16:41

## 2024-03-15 RX ADMIN — HYDROMORPHONE HYDROCHLORIDE 2 MG: 2 TABLET ORAL at 05:13

## 2024-03-15 RX ADMIN — INSULIN LISPRO 3 UNITS: 100 INJECTION, SOLUTION INTRAVENOUS; SUBCUTANEOUS at 08:40

## 2024-03-15 RX ADMIN — ATORVASTATIN CALCIUM 80 MG: 40 TABLET, FILM COATED ORAL at 16:43

## 2024-03-15 RX ADMIN — SEVELAMER HYDROCHLORIDE 800 MG: 800 TABLET, FILM COATED ORAL at 08:40

## 2024-03-15 RX ADMIN — MUPIROCIN: 20 OINTMENT TOPICAL at 08:41

## 2024-03-15 RX ADMIN — HYDROMORPHONE HYDROCHLORIDE 2 MG: 2 TABLET ORAL at 00:52

## 2024-03-15 RX ADMIN — HEPARIN SODIUM 5000 UNITS: 5000 INJECTION INTRAVENOUS; SUBCUTANEOUS at 17:25

## 2024-03-15 RX ADMIN — METRONIDAZOLE 500 MG: 500 TABLET ORAL at 21:21

## 2024-03-15 RX ADMIN — SEVELAMER HYDROCHLORIDE 800 MG: 800 TABLET, FILM COATED ORAL at 12:15

## 2024-03-15 RX ADMIN — DOCUSATE SODIUM 100 MG: 100 CAPSULE, LIQUID FILLED ORAL at 08:39

## 2024-03-15 RX ADMIN — INSULIN GLARGINE 15 UNITS: 100 INJECTION, SOLUTION SUBCUTANEOUS at 21:22

## 2024-03-15 RX ADMIN — MUPIROCIN: 20 OINTMENT TOPICAL at 16:43

## 2024-03-15 RX ADMIN — SEVELAMER HYDROCHLORIDE 800 MG: 800 TABLET, FILM COATED ORAL at 16:43

## 2024-03-15 RX ADMIN — METRONIDAZOLE 500 MG: 500 INJECTION, SOLUTION INTRAVENOUS at 02:40

## 2024-03-15 RX ADMIN — INSULIN LISPRO 3 UNITS: 100 INJECTION, SOLUTION INTRAVENOUS; SUBCUTANEOUS at 12:15

## 2024-03-15 RX ADMIN — HYDROMORPHONE HYDROCHLORIDE 2 MG: 2 TABLET ORAL at 21:21

## 2024-03-15 RX ADMIN — Medication 250 MG: at 17:26

## 2024-03-15 RX ADMIN — FAMOTIDINE 10 MG: 20 TABLET, FILM COATED ORAL at 08:40

## 2024-03-15 RX ADMIN — GABAPENTIN 300 MG: 300 CAPSULE ORAL at 21:21

## 2024-03-15 RX ADMIN — DOCUSATE SODIUM 100 MG: 100 CAPSULE, LIQUID FILLED ORAL at 17:26

## 2024-03-15 RX ADMIN — CEFEPIME HYDROCHLORIDE 1000 MG: 1 INJECTION, SOLUTION INTRAVENOUS at 16:50

## 2024-03-15 RX ADMIN — GABAPENTIN 300 MG: 300 CAPSULE ORAL at 16:43

## 2024-03-15 RX ADMIN — ACETAMINOPHEN 975 MG: 325 TABLET ORAL at 00:53

## 2024-03-15 RX ADMIN — MUPIROCIN: 20 OINTMENT TOPICAL at 21:23

## 2024-03-15 RX ADMIN — OXYCODONE HYDROCHLORIDE 5 MG: 5 TABLET ORAL at 08:38

## 2024-03-15 RX ADMIN — HEPARIN SODIUM 5000 UNITS: 5000 INJECTION INTRAVENOUS; SUBCUTANEOUS at 02:40

## 2024-03-15 RX ADMIN — GABAPENTIN 100 MG: 100 CAPSULE ORAL at 08:40

## 2024-03-15 RX ADMIN — INSULIN LISPRO 3 UNITS: 100 INJECTION, SOLUTION INTRAVENOUS; SUBCUTANEOUS at 16:44

## 2024-03-15 RX ADMIN — METRONIDAZOLE 500 MG: 500 TABLET ORAL at 10:20

## 2024-03-15 RX ADMIN — Medication 250 MG: at 08:40

## 2024-03-15 RX ADMIN — NIFEDIPINE 30 MG: 30 TABLET, EXTENDED RELEASE ORAL at 08:39

## 2024-03-15 RX ADMIN — ACETAMINOPHEN 975 MG: 325 TABLET ORAL at 08:39

## 2024-03-15 RX ADMIN — ACETAMINOPHEN 975 MG: 325 TABLET ORAL at 16:43

## 2024-03-15 RX ADMIN — HEPARIN SODIUM 5000 UNITS: 5000 INJECTION INTRAVENOUS; SUBCUTANEOUS at 10:16

## 2024-03-15 RX ADMIN — NIFEDIPINE 30 MG: 30 TABLET, EXTENDED RELEASE ORAL at 17:26

## 2024-03-15 NOTE — PROGRESS NOTES
[facility]  Progress Note  Name: Naresh Carpio I  MRN: [id number]  Unit/Bed#: [address] [place] [address] Date of Admission: 3/12/2024   Date of Service: 3/15/2024 I Hospital Day: 3    Assessment/Plan   * Facial cellulitis  Assessment & Plan  Patient presents with acute onset right facial pain, with associated redness, swelling    CT face:Extensive superficial and deep soft tissue cellulitis involving multiple spaces in the right greater than left face and neck extending inferiorly to the level of thyroid cartilage as described. The caudal portion of the inflammatory change is not completely imaged. Visualized airways are patent. No abscess. 2.  Artifact from prior dental fillings prevents reliable evaluation for dental caries. There is no periapical disease. 3.  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  Hx: Pseudomonas/MRSA-negative  ED: Vancomycin/Rocephin  ABX: Vancomycin/cefepime/metronidazole  Wcx: follow cx nondrainable, adjust antibiotics as needed  U/S face:Cellulitis in the region of the right cheek without drainable organized fluid collection. However, there is a somewhat more focal area of subcutaneous fluid measuring approximately 2 cm, which could represent a developing collection   Tylenol scheduled with Dilaudid scheduled and as needed for breakthrough, home gabapentin  D/W ENT recs:   Add extended oral antibiotic coverage Flagyl  No need for repeat CT  Warm compress as tolerated/ No surgical plans at this time  Apply bactroban ointment to right cheek lesion BID  If the pain gets worse or swelling increases, can consider ultrasound to the right cheek/neck to eval for fluid collection; at this time, I feel that the condition continues to remain as cellulitis more consolidated to the right face now and improving.  Consider ID if worsening            Electrolyte abnormality  Assessment & Plan  NA-130   K-5.4  Cl-94  We will replenish  and monitor  D/W with nephro recs: HD planned    Type 2 diabetes mellitus, without long-term current use of insulin ([facility])  Assessment & Plan  Lab Results   Component Value Date    HGBA1C 5.4 03/12/2024     (P) [id number]  Repeat A1c-5.4%  Continue home Lantus 15 units subcu  SSI in place  Will adjust basal and prandial  Carb controlled diet    ESRD (end stage renal disease) ([facility])  Assessment & Plan  Lab Results   Component Value Date    EGFR 6 03/14/2024    EGFR 8 03/13/2024    EGFR 5 03/12/2024    CREATININE 7.64 (H) 03/14/2024    CREATININE 6.09 (H) 03/13/2024    CREATININE 9.09 (H) 03/12/2024     Continue home sevelamer  Avoid nephrotoxic's  Nephro following for HD management    Medical history non-contributory  Assessment & Plan  Chronic conditions, controlled with home medication    GERD-continue home famotidine 10 mg  HTN-continue home nifedipine 30 mg twice daily  HLD-continue home statin 80 mg  Gout-reevaluate home allopurinol  Neuropathy- gabapentin         VTE Pharmacologic Prophylaxis:   Moderate Risk (Score 3-4) - Pharmacological DVT Prophylaxis Ordered: heparin.    Mobility:   Basic Mobility Inpatient Raw Score: 23  JH-HLM Goal: 7: Walk 25 feet or more  JH-HLM Achieved: 7: Walk 25 feet or more  HLM Goal NOT achieved. Continue with multidisciplinary rounding and encourage appropriate mobility to improve upon HLM goals.    Patient Centered Rounds: I evaluated the patient without nursing staff present due to alternate clinical responsibilities    Discussions with Specialists or Other Care Team Provider: ENT, nephrology    Education and Discussions with Family / Patient: Patient declined call to .     Total Time Spent on Date of Encounter in care of patient: 40 mins. This time was spent on one or more of the following: performing physical exam; counseling and coordination of care; obtaining or reviewing history; documenting in the medical record; reviewing/ordering tests, medications  or procedures; communicating with other healthcare professionals and discussing with patient's family/caregivers.    Current Length of Stay: 3 day(s)  Current Patient Status: Inpatient   Certification Statement: The patient will continue to require additional inpatient hospital stay due to IV antibiotics and specialist recs  Discharge Plan: Anticipate discharge tomorrow to home.    Code Status: Level 1 - Full Code    Subjective:   Patient seen and examined at bedside, reported pain with 5/10 but does get sharp shooting in nature.  Patient stated he had not had any heating pads, next to the bed, MD started heating pad placed on patient's face for helping with cellulitis.    Objective:     Vitals:   Temp (24hrs), Av.6 °F (37 °C), Min:98.3 °F (36.8 °C), Max:98.8 °F (37.1 °C)    Temp:  [98.3 °F (36.8 °C)-98.8 °F (37.1 °C)] 98.8 °F (37.1 °C)  HR:  [66-77] 66  Resp:  [16-19] 19  BP: (121-146)/(60-68) 121/68  SpO2:  [91 %-97 %] 97 %  There is no height or weight on file to calculate BMI.     Input and Output Summary (last 24 hours):   No intake or output data in the 24 hours ending 03/15/24 1636      Physical Exam  Vitals and nursing note reviewed.   Constitutional:       General: He is not in acute distress.     Appearance: He is well-developed.   HENT:      Head: Normocephalic and atraumatic.      Comments: Slight facial edema, tender, erythema margins reducing, fluctuant central right cheek mass, wound healing centrally     Mouth/Throat:      Dentition: Abnormal dentition. Dental caries present.   Eyes:      Conjunctiva/sclera: Conjunctivae normal.   Cardiovascular:      Rate and Rhythm: Normal rate and regular rhythm.      Heart sounds: No murmur heard.  Pulmonary:      Effort: Pulmonary effort is normal. No respiratory distress.      Breath sounds: Normal breath sounds. No wheezing or rhonchi.   Chest:      Comments: Right chest wall cather  Abdominal:      Palpations: Abdomen is soft.      Tenderness: There is no  abdominal tenderness.   Musculoskeletal:         General: No swelling.      Cervical back: Neck supple.      Right lower leg: No edema.      Left lower leg: No edema.   Skin:     General: Skin is warm and dry.      Capillary Refill: Capillary refill takes less than 2 seconds.   Neurological:      Mental Status: He is alert and oriented to person, place, and time.      Motor: No weakness.   Psychiatric:         Mood and Affect: Mood normal.          Additional Data:     Labs:  Results from last 7 days   Lab Units 03/15/24  0525   WBC Thousand/uL 9.91   HEMOGLOBIN g/dL 11.0*   HEMATOCRIT % 34.7*   PLATELETS Thousands/uL 223   NEUTROS PCT % 72   LYMPHS PCT % 12*   MONOS PCT % 12   EOS PCT % 3     Results from last 7 days   Lab Units 03/14/24  0550 03/13/24  0505 03/12/24  0939   SODIUM mmol/L 130*   < > 130*   POTASSIUM mmol/L 3.8   < > 5.4*   CHLORIDE mmol/L 94*   < > 94*   CO2 mmol/L 22   < > 25   BUN mg/dL 68*   < > 74*   CREATININE mg/dL 7.64*   < > 9.09*   ANION GAP mmol/L 14*   < > 11   CALCIUM mg/dL 8.4   < > 8.5   ALBUMIN g/dL  --   --  3.6   TOTAL BILIRUBIN mg/dL  --   --  0.42   ALK PHOS U/L  --   --  94   ALT U/L  --   --  25   AST U/L  --   --  44*   GLUCOSE RANDOM mg/dL 128   < > 110    < > = values in this interval not displayed.         Results from last 7 days   Lab Units 03/15/24  1609 03/15/24  1057 03/15/24  0720 03/14/24  2134 03/14/24  1548 03/14/24  1046 03/14/24  0701 03/13/24  2106 03/13/24  1551 03/13/24  1051 03/13/24  0703 03/12/24  2024   POC GLUCOSE mg/dl 91 118 86 95 146* 99 137 168* 208* 192* 160* 323*     Results from last 7 days   Lab Units 03/12/24  0939   HEMOGLOBIN A1C % 5.4     Results from last 7 days   Lab Units 03/12/24  0939   LACTIC ACID mmol/L 0.7       Lines/Drains:  Invasive Devices       Peripheral Intravenous Line  Duration             Peripheral IV 03/14/24 Left;Ventral (anterior) Forearm <1 day              Hemodialysis Catheter  Duration             HD Permanent Double  Catheter 198 days                      Telemetry:  Telemetry Orders (From admission, onward)               24 Hour Telemetry Monitoring  Continuous x 24 Hours (Telem)           Question:  Reason for 24 Hour Telemetry  Answer:  Metabolic/electrolyte disturbance with high probability of dysrhythmia. K level <3 or >6 OR KCL infusion >10mEq/hr                            Imaging: Reviewed radiology reports from this admission including: Facial CT    Recent Cultures (last 7 days):   Results from last 7 days   Lab Units 24  1009   BLOOD CULTURE  No Growth at 72 hrs.  No Growth at 72 hrs.       Last 24 Hours Medication List:   Current Facility-Administered Medications   Medication Dose Route Frequency Provider Last Rate    acetaminophen  975 mg Oral Q8H [facility] GRANT Rich      atorvastatin  80 mg Oral Daily With Dinner Angie Bernard MD      cefepime  1,000 mg Intravenous Q24H Angie Bernard MD 1,000 mg (24 1505)    docusate sodium  100 mg Oral BID Angie Bernard MD      famotidine  10 mg Oral Daily Angie Bernard MD      gabapentin  300 mg Oral TID Angie Bernard MD      heparin (porcine)  5,000 Units Subcutaneous Q8H [facility] Angie Bernard MD      HYDROmorphone  1 mg Intravenous Q4H PRN Angie Bernard MD      HYDROmorphone  2 mg Oral Q6H Angie Bernard MD      insulin glargine  15 Units Subcutaneous HS Angie Bernard MD      insulin lispro  1-6 Units Subcutaneous TID AC Angie Bernard MD      insulin lispro  1-6 Units Subcutaneous HS Niharika Russell PA-C      insulin lispro  3 Units Subcutaneous TID With Meals Angie Bernard MD      metroNIDAZOLE  500 mg Oral Q8H [facility] Angie Bernard MD      mupirocin   Topical TID Angie Bernard MD      NIFEdipine  30 mg Oral 2 times per day on  Kalli Mcbride PA-C      NIFEdipine  30 mg Oral Once per day on  Kalil Mcbride PA-C      ondansetron  4 mg Intravenous Q6H PRN Angie Bernard MD       oxyCODONE  5 mg Oral Q6H PRN Angie Bernard MD      polyethylene glycol  17 g Oral Daily Angie Bernard MD      saccharomyces boulardii  250 mg Oral BID Angie Bernard MD      sevelamer  800 mg Oral TID With Meals Angie Bernard MD      vancomycin  10 mg/kg (Adjusted) Intravenous Once per day on Tuesday Thursday Saturday Angie Bernard MD 1,000 mg (03/14/24 0834)        Today, Patient Was Seen By: Angie Bernard MD    **Please Note: This note may have been constructed using a voice recognition system.**

## 2024-03-15 NOTE — CASE MANAGEMENT
Case Management Discharge Planning Note    Patient name Benita Zepeda  Location [address] MRN [id number]  : 1960 Date 2023       Current Admission Date: 2023  Current Admission Diagnosis:Altered mental status   Patient Active Problem List    Diagnosis Date Noted   • Open wound of right foot 2023   • 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 mellitus, with bone involvement without evidence of necrosis ([address])    • Diabetic ulcer of left midfoot associated with type 2 diabetes mellitus, limited to breakdown of skin ([address])    • Diabetic polyneuropathy associated with type 2 diabetes mellitus ([address])    • Diabetes mellitus type 2 with peripheral artery disease ([address])    • History of amputation of lesser toe of left foot ([address])    • Onychomycosis    • Altered mental status 2023   • Status post fall 2023   • Low back pain 2023   • Traumatic rhabdomyolysis ([address]) 2023   • Leukocytosis 2023   • Electrolyte abnormality 2023   • Osteoarthritis of left hip 2022   • Severe Left Orchalgia 2022   • Right shoulder pain 2022   • Left hip pain 2022   • Hemodialysis status ([address])    • Hypervolemia    • Anemia 2022   • ESRD (end stage renal disease) ([address]) 2022   • Type 2 diabetes mellitus, without long-term current use of insulin ([facility])    • Hypertension    • History of TIA (transient ischemic attack)    • T10 vertebral fracture ([address])       LOS (days): 3  Geometric Mean LOS (GMLOS) (days): 4.50  Days to GMLOS:1.4     OBJECTIVE:  Risk of Unplanned Readmission Score: 20.92      Current admission status: Inpatient   Preferred Pharmacy:   [facility] [place] [address] [facility]  Phone: [phone] Fax: [phone]    Primary Care Provider: Ray Osman MD    Primary Insurance: MEDICARE  Secondary Insurance:     DISCHARGE DETAILS:    CM contacted family/caregiver?: Yes (Message left introducing role and requesting callback.)    Contacts  Patient Contacts: Josse Darling (friend)  Relationship to Patient[de-identified] Friend  Contact Method: Phone  Phone Number: [phone]  Reason/Outcome: Emergency Contact    Call made to [facility] clinic. Per nurse Pizarro, patient is TTS schedule with 1000 chair time. Patient drives self to/from treatment and typically arrives by 0930. [Normal Mood and Affect] : normal mood and affect [Able to Communicate] : able to communicate [Well Developed] : well developed [Well Nourished] : well nourished [NL (45)] : forward flexion 45 degrees [de-identified] : extension 30 degrees [de-identified] : left lateral rotation 45 degrees [TWNoteComboBox6] : right lateral rotation 45 degrees [NL (30)] : right lateral bending 30 degrees [Flexion] : flexion [Extension] : extension [Bending to right] : bending to right [] : patient ambulates without assistive device [Disc space narrowing] : Disc space narrowing [FreeTextEntry9] : Hips rotate well without pain.  [FreeTextEntry1] : Multilevel DDD, worse at L1-2 and L2-3 with severe  narrowing and bone spurs. [TWNoteComboBox7] : forward flexion 75 degrees

## 2024-03-15 NOTE — PROGRESS NOTES
Naresh Carpio is a 63 y.o. male who is currently ordered Vancomycin IV with management by the Pharmacy Consult service.  Relevant clinical data and objective / subjective history reviewed.  Vancomycin Assessment:  Indication and Goal AUC/Trough: Soft tissue (goal -600, trough >10)  Clinical Status: stable  Micro:     Renal Function:  SCr: 7.64 mg/dL  CrCl: 12.6 mL/min  Renal replacement: HD  Days of Therapy: 4  Current Dose: 2000mg IV once (LD)  Vancomycin Plan:  New Dosinmg IV post HD days (T--Sat)  Estimated AUC: N/A  Estimated Trough: Target Pre-HD level goal 15-20 mcg/mL  Next Level: 24 @ 0600  Renal Function Monitoring: Daily BMP and UOP  Pharmacy will continue to follow closely for s/sx of nephrotoxicity, infusion reactions and appropriateness of therapy.  BMP and CBC will be ordered per protocol. We will continue to follow the patient’s culture results and clinical progress daily.    Virginie Joseph, Pharmacist

## 2024-03-15 NOTE — PLAN OF CARE
Problem: Potential for Falls  Goal: Patient will remain free of falls  Description: INTERVENTIONS:  - Educate patient/family on patient safety including physical limitations  - Instruct patient to call for assistance with activity   - Consult OT/PT to assist with strengthening/mobility   - Keep Call bell within reach  - Keep bed low and locked with side rails adjusted as appropriate  - Keep care items and personal belongings within reach  - Initiate and maintain comfort rounds  - Make Fall Risk Sign visible to staff  - Offer Toileting every 2 Hours, in advance of need  - Initiate/Maintain bed alarm  - Obtain necessary fall risk management equipment: slipper socks  - Apply yellow socks and bracelet for high fall risk patients  - Consider moving patient to room near nurses station  Outcome: Progressing     Problem: METABOLIC, FLUID AND ELECTROLYTES - ADULT  Goal: Electrolytes maintained within normal limits  Description: INTERVENTIONS:  - Monitor labs and assess patient for signs and symptoms of electrolyte imbalances  - Administer electrolyte replacement as ordered  - Monitor response to electrolyte replacements, including repeat lab results as appropriate  - Instruct patient on fluid and nutrition as appropriate  Outcome: Progressing  Goal: Fluid balance maintained  Description: INTERVENTIONS:  - Monitor labs   - Monitor I/O and WT  - Instruct patient on fluid and nutrition as appropriate  - Assess for signs & symptoms of volume excess or deficit  Outcome: Progressing  Goal: Glucose maintained within target range  Description: INTERVENTIONS:  - Monitor Blood Glucose as ordered  - Assess for signs and symptoms of hyperglycemia and hypoglycemia  - Administer ordered medications to maintain glucose within target range  - Assess nutritional intake and initiate nutrition service referral as needed  Outcome: Progressing     Problem: PAIN - ADULT  Goal: Verbalizes/displays adequate comfort level or baseline comfort  level  Description: Interventions:  - Encourage patient to monitor pain and request assistance  - Assess pain using appropriate pain scale  - Administer analgesics based on type and severity of pain and evaluate response  - Implement non-pharmacological measures as appropriate and evaluate response  - Consider cultural and social influences on pain and pain management  - Notify physician/advanced practitioner if interventions unsuccessful or patient reports new pain  Outcome: Progressing     Problem: INFECTION - ADULT  Goal: Absence or prevention of progression during hospitalization  Description: INTERVENTIONS:  - Assess and monitor for signs and symptoms of infection  - Monitor lab/diagnostic results  - Monitor all insertion sites, i.e. indwelling lines, tubes, and drains  - Monitor endotracheal if appropriate and nasal secretions for changes in amount and color  - [place] appropriate cooling/warming therapies per order  - Administer medications as ordered  - Instruct and encourage patient and family to use good hand hygiene technique  - Identify and instruct in appropriate isolation precautions for identified infection/condition  Outcome: Progressing  Goal: Absence of fever/infection during neutropenic period  Description: INTERVENTIONS:  - Monitor WBC    Outcome: Progressing     Problem: SAFETY ADULT  Goal: Patient will remain free of falls  Description: INTERVENTIONS:  - Educate patient/family on patient safety including physical limitations  - Instruct patient to call for assistance with activity   - Consult OT/PT to assist with strengthening/mobility   - Keep Call bell within reach  - Keep bed low and locked with side rails adjusted as appropriate  - Keep care items and personal belongings within reach  - Initiate and maintain comfort rounds  - Make Fall Risk Sign visible to staff  - Offer Toileting every 2 Hours, in advance of need  - Initiate/Maintain bed alarm  - Obtain necessary fall risk management  equipment: slipper socks  - Apply yellow socks and bracelet for high fall risk patients  - Consider moving patient to room near nurses station  Outcome: Progressing  Goal: Maintain or return to baseline ADL function  Description: INTERVENTIONS:  -  Assess patient's ability to carry out ADLs; assess patient's baseline for ADL function and identify physical deficits which impact ability to perform ADLs (bathing, care of mouth/teeth, toileting, grooming, dressing, etc.)  - Assess/evaluate cause of self-care deficits   - Assess range of motion  - Assess patient's mobility; develop plan if impaired  - Assess patient's need for assistive devices and provide as appropriate  - Encourage maximum independence but intervene and supervise when necessary  - Involve family in performance of ADLs  - Assess for home care needs following discharge   - Consider OT consult to assist with ADL evaluation and planning for discharge  - Provide patient education as appropriate  Outcome: Progressing  Goal: Maintains/Returns to pre admission functional level  Description: INTERVENTIONS:  - Perform AM-PAC 6 Click Basic Mobility/ Daily Activity assessment daily.  - Set and communicate daily mobility goal to care team and patient/family/caregiver.   - Collaborate with rehabilitation services on mobility goals if consulted  - Perform Range of Motion 3 times a day.  - Reposition patient every 2 hours.  - Dangle patient 3 times a day  - Stand patient 3 times a day  - Ambulate patient 3 times a day  - Out of bed to chair 3 times a day   - Out of bed for meals 3 times a day  - Out of bed for toileting  - Record patient progress and toleration of activity level   Outcome: Progressing     Problem: DISCHARGE PLANNING  Goal: Discharge to home or other facility with appropriate resources  Description: INTERVENTIONS:  - Identify barriers to discharge w/patient and caregiver  - Arrange for needed discharge resources and transportation as appropriate  -  Identify discharge learning needs (meds, wound care, etc.)  - Arrange for interpretive services to assist at discharge as needed  - Refer to Case Management Department for coordinating discharge planning if the patient needs post-hospital services based on physician/advanced practitioner order or complex needs related to functional status, cognitive ability, or social support system  Outcome: Progressing     Problem: Knowledge Deficit  Goal: Patient/family/caregiver demonstrates understanding of disease process, treatment plan, medications, and discharge instructions  Description: Complete learning assessment and assess knowledge base.  Interventions:  - Provide teaching at level of understanding  - Provide teaching via preferred learning methods  Outcome: Progressing

## 2024-03-16 ENCOUNTER — APPOINTMENT (INPATIENT)
Dept: DIALYSIS | Facility: HOSPITAL | Age: 64
DRG: 602 | End: 2024-03-16
Attending: INTERNAL MEDICINE
Payer: MEDICARE

## 2024-03-16 LAB
ANION GAP SERPL CALCULATED.3IONS-SCNC: 13 MMOL/L (ref 4–13)
BASOPHILS # BLD AUTO: 0.04 THOUSANDS/ÂΜL (ref 0–0.1)
BASOPHILS NFR BLD AUTO: 0 % (ref 0–1)
BUN SERPL-MCNC: 58 MG/DL (ref 5–25)
CALCIUM SERPL-MCNC: 7.9 MG/DL (ref 8.4–10.2)
CHLORIDE SERPL-SCNC: 97 MMOL/L (ref 96–108)
CO2 SERPL-SCNC: 23 MMOL/L (ref 21–32)
CREAT SERPL-MCNC: 8.2 MG/DL (ref 0.6–1.3)
EOSINOPHIL # BLD AUTO: 0.54 THOUSAND/ÂΜL (ref 0–0.61)
EOSINOPHIL NFR BLD AUTO: 5 % (ref 0–6)
ERYTHROCYTE [DISTWIDTH] IN BLOOD BY AUTOMATED COUNT: 18.7 % (ref 11.6–15.1)
GFR SERPL CREATININE-BSD FRML MDRD: 6 ML/MIN/1.73SQ M
GLUCOSE SERPL-MCNC: 110 MG/DL (ref 65–140)
GLUCOSE SERPL-MCNC: 166 MG/DL (ref 65–140)
GLUCOSE SERPL-MCNC: 85 MG/DL (ref 65–140)
GLUCOSE SERPL-MCNC: 94 MG/DL (ref 65–140)
HCT VFR BLD AUTO: 34.3 % (ref 36.5–49.3)
HGB BLD-MCNC: 10.8 G/DL (ref 12–17)
IMM GRANULOCYTES # BLD AUTO: 0.1 THOUSAND/UL (ref 0–0.2)
IMM GRANULOCYTES NFR BLD AUTO: 1 % (ref 0–2)
LYMPHOCYTES # BLD AUTO: 1.42 THOUSANDS/ÂΜL (ref 0.6–4.47)
LYMPHOCYTES NFR BLD AUTO: 14 % (ref 14–44)
MCH RBC QN AUTO: 29.5 PG (ref 26.8–34.3)
MCHC RBC AUTO-ENTMCNC: 31.5 G/DL (ref 31.4–37.4)
MCV RBC AUTO: 94 FL (ref 82–98)
MONOCYTES # BLD AUTO: 1.06 THOUSAND/ÂΜL (ref 0.17–1.22)
MONOCYTES NFR BLD AUTO: 11 % (ref 4–12)
NEUTROPHILS # BLD AUTO: 6.78 THOUSANDS/ÂΜL (ref 1.85–7.62)
NEUTS SEG NFR BLD AUTO: 69 % (ref 43–75)
NRBC BLD AUTO-RTO: 0 /100 WBCS
PLATELET # BLD AUTO: 251 THOUSANDS/UL (ref 149–390)
PMV BLD AUTO: 9.7 FL (ref 8.9–12.7)
POTASSIUM SERPL-SCNC: 4.6 MMOL/L (ref 3.5–5.3)
RBC # BLD AUTO: 3.66 MILLION/UL (ref 3.88–5.62)
SODIUM SERPL-SCNC: 133 MMOL/L (ref 135–147)
VANCOMYCIN SERPL-MCNC: 20 UG/ML (ref 10–20)
WBC # BLD AUTO: 9.94 THOUSAND/UL (ref 4.31–10.16)

## 2024-03-16 PROCEDURE — NC001 PR NO CHARGE: Performed by: INTERNAL MEDICINE

## 2024-03-16 PROCEDURE — 99232 SBSQ HOSP IP/OBS MODERATE 35: CPT | Performed by: STUDENT IN AN ORGANIZED HEALTH CARE EDUCATION/TRAINING PROGRAM

## 2024-03-16 PROCEDURE — 90935 HEMODIALYSIS ONE EVALUATION: CPT | Performed by: INTERNAL MEDICINE

## 2024-03-16 PROCEDURE — 80202 ASSAY OF VANCOMYCIN: CPT | Performed by: STUDENT IN AN ORGANIZED HEALTH CARE EDUCATION/TRAINING PROGRAM

## 2024-03-16 PROCEDURE — 80048 BASIC METABOLIC PNL TOTAL CA: CPT | Performed by: INTERNAL MEDICINE

## 2024-03-16 PROCEDURE — 82948 REAGENT STRIP/BLOOD GLUCOSE: CPT

## 2024-03-16 PROCEDURE — 85025 COMPLETE CBC W/AUTO DIFF WBC: CPT | Performed by: STUDENT IN AN ORGANIZED HEALTH CARE EDUCATION/TRAINING PROGRAM

## 2024-03-16 RX ORDER — HYDROMORPHONE HCL/PF 1 MG/ML
1 SYRINGE (ML) INJECTION EVERY 4 HOURS PRN
Status: COMPLETED | OUTPATIENT
Start: 2024-03-16 | End: 2024-03-18

## 2024-03-16 RX ORDER — OXYCODONE HYDROCHLORIDE 5 MG/1
5 TABLET ORAL EVERY 6 HOURS PRN
Status: COMPLETED | OUTPATIENT
Start: 2024-03-16 | End: 2024-03-18

## 2024-03-16 RX ORDER — VANCOMYCIN HYDROCHLORIDE 750 MG/150ML
7.5 INJECTION, SOLUTION INTRAVENOUS
Status: DISCONTINUED | OUTPATIENT
Start: 2024-03-16 | End: 2024-03-17

## 2024-03-16 RX ADMIN — OXYCODONE HYDROCHLORIDE 5 MG: 5 TABLET ORAL at 00:05

## 2024-03-16 RX ADMIN — VANCOMYCIN HYDROCHLORIDE 750 MG: 1 INJECTION, SOLUTION INTRAVENOUS at 17:25

## 2024-03-16 RX ADMIN — Medication 250 MG: at 17:29

## 2024-03-16 RX ADMIN — SEVELAMER HYDROCHLORIDE 800 MG: 800 TABLET, FILM COATED ORAL at 16:22

## 2024-03-16 RX ADMIN — INSULIN GLARGINE 15 UNITS: 100 INJECTION, SOLUTION SUBCUTANEOUS at 21:59

## 2024-03-16 RX ADMIN — INSULIN LISPRO 3 UNITS: 100 INJECTION, SOLUTION INTRAVENOUS; SUBCUTANEOUS at 16:26

## 2024-03-16 RX ADMIN — ACETAMINOPHEN 975 MG: 325 TABLET ORAL at 08:15

## 2024-03-16 RX ADMIN — GABAPENTIN 300 MG: 300 CAPSULE ORAL at 16:22

## 2024-03-16 RX ADMIN — MUPIROCIN: 20 OINTMENT TOPICAL at 16:29

## 2024-03-16 RX ADMIN — METRONIDAZOLE 500 MG: 500 TABLET ORAL at 13:28

## 2024-03-16 RX ADMIN — GABAPENTIN 300 MG: 300 CAPSULE ORAL at 08:06

## 2024-03-16 RX ADMIN — METRONIDAZOLE 500 MG: 500 TABLET ORAL at 05:45

## 2024-03-16 RX ADMIN — ACETAMINOPHEN 975 MG: 325 TABLET ORAL at 00:18

## 2024-03-16 RX ADMIN — MUPIROCIN: 20 OINTMENT TOPICAL at 21:57

## 2024-03-16 RX ADMIN — HEPARIN SODIUM 5000 UNITS: 5000 INJECTION INTRAVENOUS; SUBCUTANEOUS at 11:10

## 2024-03-16 RX ADMIN — METRONIDAZOLE 500 MG: 500 TABLET ORAL at 21:57

## 2024-03-16 RX ADMIN — SEVELAMER HYDROCHLORIDE 800 MG: 800 TABLET, FILM COATED ORAL at 08:06

## 2024-03-16 RX ADMIN — MUPIROCIN: 20 OINTMENT TOPICAL at 08:06

## 2024-03-16 RX ADMIN — OXYCODONE HYDROCHLORIDE 5 MG: 5 TABLET ORAL at 08:06

## 2024-03-16 RX ADMIN — CEFEPIME HYDROCHLORIDE 1000 MG: 1 INJECTION, SOLUTION INTRAVENOUS at 16:21

## 2024-03-16 RX ADMIN — Medication 250 MG: at 08:06

## 2024-03-16 RX ADMIN — HEPARIN SODIUM 5000 UNITS: 5000 INJECTION INTRAVENOUS; SUBCUTANEOUS at 02:37

## 2024-03-16 RX ADMIN — HYDROMORPHONE HYDROCHLORIDE 1 MG: 1 INJECTION, SOLUTION INTRAMUSCULAR; INTRAVENOUS; SUBCUTANEOUS at 05:58

## 2024-03-16 RX ADMIN — INSULIN LISPRO 1 UNITS: 100 INJECTION, SOLUTION INTRAVENOUS; SUBCUTANEOUS at 16:26

## 2024-03-16 RX ADMIN — NIFEDIPINE 30 MG: 30 TABLET, EXTENDED RELEASE ORAL at 17:25

## 2024-03-16 RX ADMIN — ACETAMINOPHEN 975 MG: 325 TABLET ORAL at 16:22

## 2024-03-16 RX ADMIN — OXYCODONE HYDROCHLORIDE 5 MG: 5 TABLET ORAL at 16:25

## 2024-03-16 RX ADMIN — GABAPENTIN 300 MG: 300 CAPSULE ORAL at 21:57

## 2024-03-16 RX ADMIN — HYDROMORPHONE HYDROCHLORIDE 1 MG: 1 INJECTION, SOLUTION INTRAMUSCULAR; INTRAVENOUS; SUBCUTANEOUS at 17:29

## 2024-03-16 RX ADMIN — HEPARIN SODIUM 5000 UNITS: 5000 INJECTION INTRAVENOUS; SUBCUTANEOUS at 17:29

## 2024-03-16 RX ADMIN — FAMOTIDINE 10 MG: 20 TABLET, FILM COATED ORAL at 08:05

## 2024-03-16 RX ADMIN — INSULIN LISPRO 1 UNITS: 100 INJECTION, SOLUTION INTRAVENOUS; SUBCUTANEOUS at 21:58

## 2024-03-16 RX ADMIN — ATORVASTATIN CALCIUM 80 MG: 40 TABLET, FILM COATED ORAL at 16:22

## 2024-03-16 NOTE — PLAN OF CARE
Patient presents for a 4 hour HD session on a 2K2.5Ca bath for a serum potassium of 4.6 mmol/L drawn today with a net UF goal of 1.5L to slightly challenge EDW per patient request. Dr. Shaila dick    Post-Dialysis RN Treatment Note    Blood Pressure:  Pre 143/70 mm/Hg  Post 154/70 mmHg   EDW  108.0 kg    Weight:  Pre 109.2 kg   Post 107.3 kg   Mode of weight measurement: Standing Scale   Volume Removed  1500 ml    Treatment duration 180 minutes    NS given  No    Treatment shortened? Yes, describe: due to delayed start   Medications given during Rx Not Applicable   Estimated Kt/V  Not Applicable   Access type: Permacath/TDC   Access Issues: No    Report called to primary nurse   Yes, Elinor Darby RN     Problem: METABOLIC, FLUID AND ELECTROLYTES - ADULT  Goal: Electrolytes maintained within normal limits  Description: INTERVENTIONS:  - Monitor labs and assess patient for signs and symptoms of electrolyte imbalances  - Administer electrolyte replacement as ordered  - Monitor response to electrolyte replacements, including repeat lab results as appropriate  - Instruct patient on fluid and nutrition as appropriate  Outcome: Progressing  Goal: Fluid balance maintained  Description: INTERVENTIONS:  - Monitor labs   - Monitor I/O and WT  - Instruct patient on fluid and nutrition as appropriate  - Assess for signs & symptoms of volume excess or deficit  Outcome: Progressing

## 2024-03-16 NOTE — PLAN OF CARE
Problem: Potential for Falls  Goal: Patient will remain free of falls  Description: INTERVENTIONS:  - Educate patient/family on patient safety including physical limitations  - Instruct patient to call for assistance with activity   - Consult OT/PT to assist with strengthening/mobility   - Keep Call bell within reach  - Keep bed low and locked with side rails adjusted as appropriate  - Keep care items and personal belongings within reach  - Initiate and maintain comfort rounds  - Make Fall Risk Sign visible to staff  - Offer Toileting every 2 Hours, in advance of need  - Initiate/Maintain bed/chair alarm  - O  - Apply yellow socks and bracelet for high fall risk patients  - Consider moving patient to room near nurses station  Outcome: Progressing     Problem: METABOLIC, FLUID AND ELECTROLYTES - ADULT  Goal: Fluid balance maintained  Description: INTERVENTIONS:  - Monitor labs   - Monitor I/O and WT  - Instruct patient on fluid and nutrition as appropriate  - Assess for signs & symptoms of volume excess or deficit  Outcome: Progressing     Problem: PAIN - ADULT  Goal: Verbalizes/displays adequate comfort level or baseline comfort level  Description: Interventions:  - Encourage patient to monitor pain and request assistance  - Assess pain using appropriate pain scale  - Administer analgesics based on type and severity of pain and evaluate response  - Implement non-pharmacological measures as appropriate and evaluate response  - Consider cultural and social influences on pain and pain management  - Notify physician/advanced practitioner if interventions unsuccessful or patient reports new pain  Outcome: Progressing     Problem: SAFETY ADULT  Goal: Patient will remain free of falls  Description: INTERVENTIONS:  - Educate patient/family on patient safety including physical limitations  - Instruct patient to call for assistance with activity   - Consult OT/PT to assist with strengthening/mobility   - Keep Call bell  within reach  - Keep bed low and locked with side rails adjusted as appropriate  - Keep care items and personal belongings within reach  - Initiate and maintain comfort rounds  - Make Fall Risk Sign visible to staff  - Offer Toileting every 2 Hours, in advance of need  - Initiate/Maintain bed/chair alarm  - O  - Apply yellow socks and bracelet for high fall risk patients  - Consider moving patient to room near nurses station  Outcome: Progressing     Problem: DISCHARGE PLANNING  Goal: Discharge to home or other facility with appropriate resources  Description: INTERVENTIONS:  - Identify barriers to discharge w/patient and caregiver  - Arrange for needed discharge resources and transportation as appropriate  - Identify discharge learning needs (meds, wound care, etc.)  - Arrange for interpretive services to assist at discharge as needed  - Refer to Case Management Department for coordinating discharge planning if the patient needs post-hospital services based on physician/advanced practitioner order or complex needs related to functional status, cognitive ability, or social support system  Outcome: Progressing     Problem: Knowledge Deficit  Goal: Patient/family/caregiver demonstrates understanding of disease process, treatment plan, medications, and discharge instructions  Description: Complete learning assessment and assess knowledge base.  Interventions:  - Provide teaching at level of understanding  - Provide teaching via preferred learning methods  Outcome: Progressing

## 2024-03-16 NOTE — PROGRESS NOTES
NEPHROLOGY HOSPITAL PROGRESS NOTE   Naresh Carpio 63 y.o. male MRN: [id number]  Unit/Bed#: [address] [place] [address] Encounter: [id number]  Reason for Consult: ESRD on HD     ASSESSMENT and PLAN:  63-year-old male with ESRD on HD admitted with facial cellulitis.  We are consulted for management of ESRD.    1.  ESRD on HD.  TTS at Replaced by [facility].  EDW is 108 kg.  Access is right chest wall permacath.  Dialysis today.    HEMODIALYSIS PROCEDURE NOTE  The patient was seen and examined on hemodialysis.  Time: 3 hours  Sodium: 140 Blood flow: 400   Dialyzer: F160 Potassium: 2 Dialysate flow: 600   Access: R TDC Bicarbonate: 35 Ultrafiltration goal: 1.5 L   Medications on HD: None     2.  History of hypertension.  Blood pressure currently at goal.  Continue nifedipine 30 mg twice daily on nondialysis days and only at night on dialysis days.  We will ultrafiltrate to achieve estimated dry weight of 108 kg on dialysis today.     3.  Anemia in CKD.  Open today 10.8 at goal.  Outpatient management with Mircera 150 mcg every 2 weeks.  No IV iron given current infection.    4.  Secondary hyperparathyroidism of renal origin.  He takes Velphoro as outpatient.  For inpatient, continue Renvela 1 tablet 3 times daily with meals.  Continue low phosphorus diet.     5.  Hyperkalemia.  This is currently resolved.  We will use 2K bath on hemodialysis today for serum potassium of 4.6.     6.  Hyponatremia.  Serum sodium 133.  This is due to lack of free water clearance in setting of ESRD.  This will be corrected with ultrafiltration and use of 140 mEq dialysis sodium bath.     7.  Facial cellulitis.  Antibiotics per primary team.  Currently receiving IV vancomycin on dialysis days, IV cefepime 1000 mg daily and IV Flagyl 500 mg 3 times daily.  Notes reviewed from ENT and plans noted for ultrasound of right cheek if swelling worsens, continuing with nonsurgical management.    Discussed with internal medicine team.  After discussion, we agreed  to continue hemodialysis on TTS schedule while patient is currently admitted.    SUBJECTIVE / 24H INTERVAL HISTORY:  Patient was seen and examined on hemodialysis this morning.  He was tolerating session well.  He complains of pain in his right cheek.  He denies dyspnea.  He denies leg swelling.    REVIEW OF SYSTEMS:  Review of Systems   Constitutional:  Negative for chills and fever.   HENT:  Negative for ear pain and sore throat.    Eyes:  Negative for pain and visual disturbance.   Respiratory:  Negative for cough and shortness of breath.    Cardiovascular:  Negative for chest pain and palpitations.   Gastrointestinal:  Negative for abdominal pain and vomiting.   Genitourinary:  Negative for dysuria and hematuria.   Musculoskeletal:  Negative for arthralgias and back pain.   Skin:  Negative for color change and rash.   Neurological:  Negative for seizures and syncope.   All other systems reviewed and are negative.    OBJECTIVE:  Current Weight:    Vitals:    03/15/24 0753 03/15/24 1545 03/15/24 1934 03/15/24 2226   BP: 138/60 121/68 120/70 126/62   BP Location: Right arm Left arm Left arm Left arm   Pulse: 74 66 69 68   Resp:  19 19 19   Temp: 98.3 °F (36.8 °C) 98.8 °F (37.1 °C) 97.8 °F (36.6 °C) 98.8 °F (37.1 °C)   TempSrc: Oral Oral Oral Oral   SpO2: 92% 97% 98% 99%     No intake or output data in the 24 hours ending 03/16/24 0718    Physical Exam  Vitals and nursing note reviewed.   Constitutional:       General: He is not in acute distress.     Appearance: He is well-developed.   HENT:      Head: Normocephalic and atraumatic.   Eyes:      Conjunctiva/sclera: Conjunctivae normal.   Cardiovascular:      Rate and Rhythm: Normal rate and regular rhythm.      Heart sounds: No murmur heard.     Comments: Right chest wall permacath currently in use for hemodialysis  Pulmonary:      Effort: Pulmonary effort is normal. No respiratory distress.      Breath sounds: Normal breath sounds.   Abdominal:      Palpations:  Abdomen is soft.      Tenderness: There is no abdominal tenderness.   Musculoskeletal:         General: No swelling.      Cervical back: Neck supple.      Right lower leg: No edema.      Left lower leg: No edema.   Skin:     General: Skin is warm and dry.      Capillary Refill: Capillary refill takes less than 2 seconds.   Neurological:      Mental Status: He is alert.   Psychiatric:         Mood and Affect: Mood normal.       Medications:    Current Facility-Administered Medications:     acetaminophen (TYLENOL) tablet 975 mg, 975 mg, Oral, Q8H [facility], GRANT Rich, 975 mg at 03/16/24 0018    atorvastatin (LIPITOR) tablet 80 mg, 80 mg, Oral, Daily With Dinner, Angie Bernard MD, 80 mg at 03/15/24 1643    cefepime (MAXIPIME) IVPB (premix in dextrose) 1,000 mg 50 mL, 1,000 mg, Intravenous, Q24H, Angie Bernard MD, Stopped at 03/15/24 1720    docusate sodium (COLACE) capsule 100 mg, 100 mg, Oral, BID, Angie Bernard MD, 100 mg at 03/15/24 1726    famotidine (PEPCID) tablet 10 mg, 10 mg, Oral, Daily, Angie Bernard MD, 10 mg at 03/15/24 0840    gabapentin (NEURONTIN) capsule 300 mg, 300 mg, Oral, TID, Angie Bernard MD, 300 mg at 03/15/24 2121    heparin (porcine) subcutaneous injection 5,000 Units, 5,000 Units, Subcutaneous, Q8H [facility], Angie Bernard MD, 5,000 Units at 03/16/24 0237    insulin glargine (LANTUS) subcutaneous injection 15 Units 0.15 mL, 15 Units, Subcutaneous, HS, Angie Bernard MD, 15 Units at 03/15/24 2122    insulin lispro (HumALOG/ADMELOG) 100 units/mL subcutaneous injection 1-6 Units, 1-6 Units, Subcutaneous, TID AC, 2 Units at 03/13/24 1606 **AND** Fingerstick Glucose (POCT), , , TID AC, Angie Bernard MD    insulin lispro (HumALOG/ADMELOG) 100 units/mL subcutaneous injection 1-6 Units, 1-6 Units, Subcutaneous, HS, Niharika Russell PA-C, 1 Units at 03/13/24 2127    insulin lispro (HumALOG/ADMELOG) 100 units/mL subcutaneous injection 3 Units, 3 Units, Subcutaneous, TID With Meals, Angie  TRICE Bernard MD, 3 Units at 03/15/24 1644    metroNIDAZOLE (FLAGYL) tablet 500 mg, 500 mg, Oral, Q8H KEMAL, Angie Bernard MD, 500 mg at 03/16/24 0545    mupirocin (BACTROBAN) 2 % ointment, , Topical, TID, Angie Bernard MD, Given at 03/15/24 2123    NIFEdipine (PROCARDIA XL) 24 hr tablet 30 mg, 30 mg, Oral, 2 times per day on Sunday Monday Wednesday Friday, Kalli Mcbride PA-C, 30 mg at 03/15/24 1726    NIFEdipine (PROCARDIA XL) 24 hr tablet 30 mg, 30 mg, Oral, Once per day on Tuesday Thursday Saturday, Kalli Mcbride PA-C, 30 mg at 03/14/24 2107    ondansetron (ZOFRAN) injection 4 mg, 4 mg, Intravenous, Q6H PRN, Angie Bernard MD    oxyCODONE (ROXICODONE) IR tablet 5 mg, 5 mg, Oral, Q6H PRN, Angie Bernard MD, 5 mg at 03/16/24 0005    polyethylene glycol (MIRALAX) packet 17 g, 17 g, Oral, Daily, Angie Bernard MD, 17 g at 03/12/24 1748    saccharomyces boulardii (FLORASTOR) capsule 250 mg, 250 mg, Oral, BID, Angie Bernard MD, 250 mg at 03/15/24 1726    sevelamer (RENAGEL) tablet 800 mg, 800 mg, Oral, TID With Meals, Angie Bernard MD, 800 mg at 03/15/24 1643    vancomycin (VANCOCIN) IVPB (premix in dextrose) 1,000 mg 200 mL, 10 mg/kg (Adjusted), Intravenous, Once per day on Tuesday Thursday Saturday, Angie Bernard MD, Last Rate: 200 mL/hr at 03/14/24 1556, 1,000 mg at 03/14/24 1556    Laboratory Results:  Results from last 7 days   Lab Units 03/16/24  0541 03/15/24  0525 03/14/24  0550 03/13/24  0505 03/12/24  0939   WBC Thousand/uL 9.94 9.91 11.49*  --  13.53*   HEMOGLOBIN g/dL 10.8* 11.0* 11.5*  --  10.8*   HEMATOCRIT % 34.3* 34.7* 35.4*  --  33.6*   PLATELETS Thousands/uL 251 223 237  --  162   POTASSIUM mmol/L 4.6  --  3.8 3.8 5.4*   CHLORIDE mmol/L 97  --  94* 94* 94*   CO2 mmol/L 23  --  22 27 25   BUN mg/dL 58*  --  68* 52* 74*   CREATININE mg/dL 8.20*  --  7.64* 6.09* 9.09*   CALCIUM mg/dL 7.9*  --  8.4 8.5 8.5   MAGNESIUM mg/dL  --   --   --   --  2.2       Portions of the record may have been  "created with voice recognition software. Occasional wrong word or \"sound a like\" substitutions may have occurred due to the inherent limitations of voice recognition software. Read the chart carefully and recognize, using context, where substitutions have occurred. If you have any questions, please contact the dictating provider.    "

## 2024-03-16 NOTE — PLAN OF CARE
Problem: Potential for Falls  Goal: Patient will remain free of falls  Description: INTERVENTIONS:  - Educate patient/family on patient safety including physical limitations  - Instruct patient to call for assistance with activity   - Consult OT/PT to assist with strengthening/mobility   - Keep Call bell within reach  - Keep bed low and locked with side rails adjusted as appropriate  - Keep care items and personal belongings within reach  - Initiate and maintain comfort rounds  - Make Fall Risk Sign visible to staff  - Offer Toileting every 2 Hours, in advance of need  - Initiate/Maintain bed/chair alarm  - O  - Apply yellow socks and bracelet for high fall risk patients  - Consider moving patient to room near nurses station  Outcome: Progressing     Problem: METABOLIC, FLUID AND ELECTROLYTES - ADULT  Goal: Electrolytes maintained within normal limits  Description: INTERVENTIONS:  - Monitor labs and assess patient for signs and symptoms of electrolyte imbalances  - Administer electrolyte replacement as ordered  - Monitor response to electrolyte replacements, including repeat lab results as appropriate  - Instruct patient on fluid and nutrition as appropriate  Outcome: Progressing  Goal: Fluid balance maintained  Description: INTERVENTIONS:  - Monitor labs   - Monitor I/O and WT  - Instruct patient on fluid and nutrition as appropriate  - Assess for signs & symptoms of volume excess or deficit  Outcome: Progressing  Goal: Glucose maintained within target range  Description: INTERVENTIONS:  - Monitor Blood Glucose as ordered  - Assess for signs and symptoms of hyperglycemia and hypoglycemia  - Administer ordered medications to maintain glucose within target range  - Assess nutritional intake and initiate nutrition service referral as needed  Outcome: Progressing     Problem: PAIN - ADULT  Goal: Verbalizes/displays adequate comfort level or baseline comfort level  Description: Interventions:  - Encourage patient to  monitor pain and request assistance  - Assess pain using appropriate pain scale  - Administer analgesics based on type and severity of pain and evaluate response  - Implement non-pharmacological measures as appropriate and evaluate response  - Consider cultural and social influences on pain and pain management  - Notify physician/advanced practitioner if interventions unsuccessful or patient reports new pain  Outcome: Progressing     Problem: INFECTION - ADULT  Goal: Absence or prevention of progression during hospitalization  Description: INTERVENTIONS:  - Assess and monitor for signs and symptoms of infection  - Monitor lab/diagnostic results  - Monitor all insertion sites, i.e. indwelling lines, tubes, and drains  - Monitor endotracheal if appropriate and nasal secretions for changes in amount and color  - [place] appropriate cooling/warming therapies per order  - Administer medications as ordered  - Instruct and encourage patient and family to use good hand hygiene technique  - Identify and instruct in appropriate isolation precautions for identified infection/condition  Outcome: Progressing     Problem: SAFETY ADULT  Goal: Patient will remain free of falls  Description: INTERVENTIONS:  - Educate patient/family on patient safety including physical limitations  - Instruct patient to call for assistance with activity   - Consult OT/PT to assist with strengthening/mobility   - Keep Call bell within reach  - Keep bed low and locked with side rails adjusted as appropriate  - Keep care items and personal belongings within reach  - Initiate and maintain comfort rounds  - Make Fall Risk Sign visible to staff  - Offer Toileting every 2 Hours, in advance of need  - Initiate/Maintain bed/chair alarm  - O  - Apply yellow socks and bracelet for high fall risk patients  - Consider moving patient to room near nurses station  Outcome: Progressing     Problem: DISCHARGE PLANNING  Goal: Discharge to home or other facility with  appropriate resources  Description: INTERVENTIONS:  - Identify barriers to discharge w/patient and caregiver  - Arrange for needed discharge resources and transportation as appropriate  - Identify discharge learning needs (meds, wound care, etc.)  - Arrange for interpretive services to assist at discharge as needed  - Refer to Case Management Department for coordinating discharge planning if the patient needs post-hospital services based on physician/advanced practitioner order or complex needs related to functional status, cognitive ability, or social support system  Outcome: Progressing     Problem: Knowledge Deficit  Goal: Patient/family/caregiver demonstrates understanding of disease process, treatment plan, medications, and discharge instructions  Description: Complete learning assessment and assess knowledge base.  Interventions:  - Provide teaching at level of understanding  - Provide teaching via preferred learning methods  Outcome: Progressing

## 2024-03-16 NOTE — PROGRESS NOTES
[facility]  Progress Note  Name: Naresh Carpio I  MRN: [id number]  Unit/Bed#: [address] [address] I Date of Admission: 3/12/2024   Date of Service: 3/16/2024 I Hospital Day: 4    Assessment/Plan   * Facial cellulitis  Assessment & Plan  Patient presents with acute onset right facial pain, with associated redness, swelling    CT face:Extensive superficial and deep soft tissue cellulitis involving multiple spaces in the right greater than left face and neck extending inferiorly to the level of thyroid cartilage as described. The caudal portion of the inflammatory change is not completely imaged. Visualized airways are patent. No abscess. 2.  Artifact from prior dental fillings prevents reliable evaluation for dental caries. There is no periapical disease. 3.  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  Hx: Pseudomonas/MRSA-negative  ED: Vancomycin/Rocephin  ABX: Vancomycin/cefepime/metronidazole  Wcx: follow cx nondrainable, adjust antibiotics as needed  U/S face:Cellulitis in the region of the right cheek without drainable organized fluid collection. However, there is a somewhat more focal area of subcutaneous fluid measuring approximately 2 cm, which could represent a developing collection   Will repeat ultrasound if no resolution 1-2-day  Tylenol scheduled with Dilaudid scheduled and as needed for breakthrough, home gabapentin  D/W ENT recs:   Add extended oral antibiotic coverage Flagyl  No need for repeat CT  Warm compress as tolerated/ No surgical plans at this time  Apply bactroban ointment to right cheek lesion BID  If the pain gets worse or swelling increases, can consider ultrasound to the right cheek/neck to eval for fluid collection; at this time, I feel that the condition continues to remain as cellulitis more consolidated to the right face now and improving.  Consider ID if worsening            Electrolyte abnormality  Assessment  & Plan  NA-130   K-5.4  Cl-94  We will replenish and monitor  D/W with nephro recs: HD planned    Type 2 diabetes mellitus, without long-term current use of insulin ([facility])  Assessment & Plan  Lab Results   Component Value Date    HGBA1C 5.4 03/12/2024     (P) [id number]  Repeat A1c-5.4%  Continue home Lantus 15 units subcu  SSI in place  Will adjust basal and prandial  Carb controlled diet    ESRD (end stage renal disease) ([facility])  Assessment & Plan  Lab Results   Component Value Date    EGFR 6 03/16/2024    EGFR 6 03/14/2024    EGFR 8 03/13/2024    CREATININE 8.20 (H) 03/16/2024    CREATININE 7.64 (H) 03/14/2024    CREATININE 6.09 (H) 03/13/2024     Continue home sevelamer  Avoid nephrotoxic's  Nephro following for HD management    Medical history non-contributory  Assessment & Plan  Chronic conditions, controlled with home medication    GERD-continue home famotidine 10 mg  HTN-continue home nifedipine 30 mg twice daily  HLD-continue home statin 80 mg  Gout-reevaluate home allopurinol  Neuropathy- gabapentin         VTE Pharmacologic Prophylaxis:   Moderate Risk (Score 3-4) - Pharmacological DVT Prophylaxis Ordered: heparin.    Mobility:   Basic Mobility Inpatient Raw Score: 24  JH-HLM Goal: 8: Walk 250 feet or more  JH-HLM Achieved: 7: Walk 25 feet or more  HLM Goal NOT achieved. Continue with multidisciplinary rounding and encourage appropriate mobility to improve upon HLM goals.    Patient Centered Rounds: I evaluated the patient without nursing staff present due to HD nurse    Discussions with Specialists or Other Care Team Provider: ENT, nephrology    Education and Discussions with Family / Patient: Patient declined call to .     Total Time Spent on Date of Encounter in care of patient: 40 mins. This time was spent on one or more of the following: performing physical exam; counseling and coordination of care; obtaining or reviewing history; documenting in the medical record;  reviewing/ordering tests, medications or procedures; communicating with other healthcare professionals and discussing with patient's family/caregivers.    Current Length of Stay: 4 day(s)  Current Patient Status: Inpatient   Certification Statement: The patient will continue to require additional inpatient hospital stay due to IV antibiotics and specialist recs  Discharge Plan: Anticipate discharge tomorrow to home.    Code Status: Level 1 - Full Code    Subjective:   Patient seen and examined at bedside while obtaining dialysis, reported cheek pain 7/10, reported feeling a lot better, decreased shooting pain, informed of increasing gabapentin, denied fever or chills.  Informed of possible serial ultrasounds if no reduction.    Objective:     Vitals:   Temp (24hrs), Av.1 °F (36.7 °C), Min:97.5 °F (36.4 °C), Max:98.8 °F (37.1 °C)    Temp:  [97.5 °F (36.4 °C)-98.8 °F (37.1 °C)] 97.5 °F (36.4 °C)  HR:  [63-80] 80  Resp:  [18-19] 18  BP: (120-162)/(51-82) 143/82  SpO2:  [93 %-99 %] 93 %  There is no height or weight on file to calculate BMI.     Input and Output Summary (last 24 hours):     Intake/Output Summary (Last 24 hours) at 3/16/2024 1530  Last data filed at 3/16/2024 1208  Gross per 24 hour   Intake 500 ml   Output 2000 ml   Net -1500 ml         Physical Exam  Vitals and nursing note reviewed.   Constitutional:       General: He is not in acute distress.     Appearance: He is well-developed.   HENT:      Head: Normocephalic and atraumatic.      Comments: Right cheek erythema, fluctuant, decreased tenderness, wound nonpurulent  Eyes:      Conjunctiva/sclera: Conjunctivae normal.   Cardiovascular:      Rate and Rhythm: Normal rate and regular rhythm.      Heart sounds: No murmur heard.  Pulmonary:      Effort: Pulmonary effort is normal. No respiratory distress.      Breath sounds: Normal breath sounds. No wheezing or rhonchi.   Chest:      Comments: Right chest wall cath  Abdominal:      Palpations: Abdomen  is soft.      Tenderness: There is no abdominal tenderness.   Musculoskeletal:         General: No swelling.      Cervical back: Neck supple.      Right lower leg: No edema.      Left lower leg: No edema.   Skin:     General: Skin is warm and dry.      Capillary Refill: Capillary refill takes less than 2 seconds.   Neurological:      Mental Status: He is alert and oriented to person, place, and time.   Psychiatric:         Mood and Affect: Mood normal.          Additional Data:     Labs:  Results from last 7 days   Lab Units 03/16/24  0541   WBC Thousand/uL 9.94   HEMOGLOBIN g/dL 10.8*   HEMATOCRIT % 34.3*   PLATELETS Thousands/uL 251   NEUTROS PCT % 69   LYMPHS PCT % 14   MONOS PCT % 11   EOS PCT % 5     Results from last 7 days   Lab Units 03/16/24  0541 03/13/24  0505 03/12/24  0939   SODIUM mmol/L 133*   < > 130*   POTASSIUM mmol/L 4.6   < > 5.4*   CHLORIDE mmol/L 97   < > 94*   CO2 mmol/L 23   < > 25   BUN mg/dL 58*   < > 74*   CREATININE mg/dL 8.20*   < > 9.09*   ANION GAP mmol/L 13   < > 11   CALCIUM mg/dL 7.9*   < > 8.5   ALBUMIN g/dL  --   --  3.6   TOTAL BILIRUBIN mg/dL  --   --  0.42   ALK PHOS U/L  --   --  94   ALT U/L  --   --  25   AST U/L  --   --  44*   GLUCOSE RANDOM mg/dL 85   < > 110    < > = values in this interval not displayed.         Results from last 7 days   Lab Units 03/16/24  1121 03/16/24  0714 03/15/24  2023 03/15/24  1609 03/15/24  1057 03/15/24  0720 03/14/24  2134 03/14/24  1548 03/14/24  1046 03/14/24  0701 03/13/24  2106 03/13/24  1551   POC GLUCOSE mg/dl 110 94 146* 91 118 86 95 146* 99 137 168* 208*     Results from last 7 days   Lab Units 03/12/24  0939   HEMOGLOBIN A1C % 5.4     Results from last 7 days   Lab Units 03/12/24  0939   LACTIC ACID mmol/L 0.7       Lines/Drains:  Invasive Devices       Peripheral Intravenous Line  Duration             Peripheral IV 03/14/24 Left;Ventral (anterior) Forearm 1 day              Hemodialysis Catheter  Duration             HD Permanent  Double Catheter 199 days                        Imaging: Reviewed radiology reports from this admission including: Facial CT    Recent Cultures (last 7 days):   Results from last 7 days   Lab Units 03/12/24  1009   BLOOD CULTURE  No Growth After 4 Days.  No Growth After 4 Days.       Last 24 Hours Medication List:   Current Facility-Administered Medications   Medication Dose Route Frequency Provider Last Rate    acetaminophen  975 mg Oral Q8H [facility] [place] Rabago RAMEZАЛЕКСАНДР      atorvastatin  80 mg Oral Daily With Dinner Angie Bernard MD      cefepime  1,000 mg Intravenous Q24H Angie Bernard MD Stopped (03/15/24 1720)    docusate sodium  100 mg Oral BID Angie Bernard MD      famotidine  10 mg Oral Daily Angie Bernard MD      gabapentin  300 mg Oral TID Angie Bernard MD      heparin (porcine)  5,000 Units Subcutaneous Q8H [facility] Angie Bernard MD      HYDROmorphone  1 mg Intravenous Q4H PRN Angie Bernard MD      insulin glargine  15 Units Subcutaneous HS Angie Bernard MD      insulin lispro  1-6 Units Subcutaneous TID AC Angie Bernard MD      insulin lispro  1-6 Units Subcutaneous HS Niharika Russell PA-C      insulin lispro  3 Units Subcutaneous TID With Meals Angie Bernard MD      metroNIDAZOLE  500 mg Oral Q8H [facility] Angie Bernard MD      mupirocin   Topical TID Angie Bernard MD      NIFEdipine  30 mg Oral 2 times per day on Sunday Monday Wednesday Friday Kalli Mcbride PA-C      NIFEdipine  30 mg Oral Once per day on Tuesday Thursday Saturday Kalli Mcbride PA-C      ondansetron  4 mg Intravenous Q6H PRN Angie Bernard MD      oxyCODONE  5 mg Oral Q6H PRN Angie Bernard MD      polyethylene glycol  17 g Oral Daily Angie Bernard MD      saccharomyces boulardii  250 mg Oral BID Angie Bernard MD      sevelamer  800 mg Oral TID With Meals Angie Bernard MD      vancomycin  7.5 mg/kg (Adjusted) Intravenous Once per day on Tuesday Thursday Saturday Angie Bernard MD          Today, Patient Was  Seen By: Angie Bernard MD    **Please Note: This note may have been constructed using a voice recognition system.**

## 2024-03-16 NOTE — PROGRESS NOTES
Naresh Carpio is a 63 y.o. male who is currently ordered Vancomycin IV with management by the Pharmacy Consult service.  Relevant clinical data and objective / subjective history reviewed.  Vancomycin Assessment:  Indication and Goal AUC/Trough: Other, Cellulitis, -600, trough >10  Clinical Status: stable  Micro:   MRSA negative. Continue broad coverage   Renal Function:  SCr: 8.2 mg/dL  CrCl: 11.8 mL/min  Renal replacement: HD  Days of Therapy: 5  Current Dose: 1000mg IV post HD days (T--Sat)  Vancomycin Plan:  New Dosinmg IV post HD days (T--Sat)  Next Level: 3/23/24 @0600  Renal Function Monitoring: Daily BMP and UOP  Pharmacy will continue to follow closely for s/sx of nephrotoxicity, infusion reactions and appropriateness of therapy.  BMP and CBC will be ordered per protocol. We will continue to follow the patient’s culture results and clinical progress daily.    Do Radha Pedersen, Pharmacist

## 2024-03-17 LAB
BACTERIA BLD CULT: NORMAL
BACTERIA BLD CULT: NORMAL
BASOPHILS # BLD AUTO: 0.05 THOUSANDS/ÂΜL (ref 0–0.1)
BASOPHILS NFR BLD AUTO: 1 % (ref 0–1)
EOSINOPHIL # BLD AUTO: 0.61 THOUSAND/ÂΜL (ref 0–0.61)
EOSINOPHIL NFR BLD AUTO: 6 % (ref 0–6)
ERYTHROCYTE [DISTWIDTH] IN BLOOD BY AUTOMATED COUNT: 18.3 % (ref 11.6–15.1)
GLUCOSE SERPL-MCNC: 130 MG/DL (ref 65–140)
GLUCOSE SERPL-MCNC: 206 MG/DL (ref 65–140)
GLUCOSE SERPL-MCNC: 81 MG/DL (ref 65–140)
GLUCOSE SERPL-MCNC: 86 MG/DL (ref 65–140)
HCT VFR BLD AUTO: 35.5 % (ref 36.5–49.3)
HGB BLD-MCNC: 11.1 G/DL (ref 12–17)
IMM GRANULOCYTES # BLD AUTO: 0.14 THOUSAND/UL (ref 0–0.2)
IMM GRANULOCYTES NFR BLD AUTO: 1 % (ref 0–2)
LYMPHOCYTES # BLD AUTO: 1.32 THOUSANDS/ÂΜL (ref 0.6–4.47)
LYMPHOCYTES NFR BLD AUTO: 13 % (ref 14–44)
MCH RBC QN AUTO: 29.7 PG (ref 26.8–34.3)
MCHC RBC AUTO-ENTMCNC: 31.3 G/DL (ref 31.4–37.4)
MCV RBC AUTO: 95 FL (ref 82–98)
MONOCYTES # BLD AUTO: 1.2 THOUSAND/ÂΜL (ref 0.17–1.22)
MONOCYTES NFR BLD AUTO: 12 % (ref 4–12)
NEUTROPHILS # BLD AUTO: 6.88 THOUSANDS/ÂΜL (ref 1.85–7.62)
NEUTS SEG NFR BLD AUTO: 67 % (ref 43–75)
NRBC BLD AUTO-RTO: 0 /100 WBCS
PLATELET # BLD AUTO: 270 THOUSANDS/UL (ref 149–390)
PMV BLD AUTO: 9.6 FL (ref 8.9–12.7)
RBC # BLD AUTO: 3.74 MILLION/UL (ref 3.88–5.62)
WBC # BLD AUTO: 10.2 THOUSAND/UL (ref 4.31–10.16)

## 2024-03-17 PROCEDURE — 82948 REAGENT STRIP/BLOOD GLUCOSE: CPT

## 2024-03-17 PROCEDURE — 99232 SBSQ HOSP IP/OBS MODERATE 35: CPT | Performed by: STUDENT IN AN ORGANIZED HEALTH CARE EDUCATION/TRAINING PROGRAM

## 2024-03-17 PROCEDURE — 85025 COMPLETE CBC W/AUTO DIFF WBC: CPT | Performed by: STUDENT IN AN ORGANIZED HEALTH CARE EDUCATION/TRAINING PROGRAM

## 2024-03-17 RX ORDER — CEFAZOLIN SODIUM 2 G/50ML
2000 SOLUTION INTRAVENOUS EVERY 8 HOURS
Status: DISCONTINUED | OUTPATIENT
Start: 2024-03-17 | End: 2024-03-20

## 2024-03-17 RX ADMIN — ACETAMINOPHEN 975 MG: 325 TABLET ORAL at 01:41

## 2024-03-17 RX ADMIN — METRONIDAZOLE 500 MG: 500 TABLET ORAL at 06:06

## 2024-03-17 RX ADMIN — GABAPENTIN 300 MG: 300 CAPSULE ORAL at 09:27

## 2024-03-17 RX ADMIN — ATORVASTATIN CALCIUM 80 MG: 40 TABLET, FILM COATED ORAL at 16:52

## 2024-03-17 RX ADMIN — GABAPENTIN 300 MG: 300 CAPSULE ORAL at 16:52

## 2024-03-17 RX ADMIN — Medication 250 MG: at 18:49

## 2024-03-17 RX ADMIN — HEPARIN SODIUM 5000 UNITS: 5000 INJECTION INTRAVENOUS; SUBCUTANEOUS at 09:27

## 2024-03-17 RX ADMIN — DOCUSATE SODIUM 100 MG: 100 CAPSULE, LIQUID FILLED ORAL at 09:27

## 2024-03-17 RX ADMIN — SEVELAMER HYDROCHLORIDE 800 MG: 800 TABLET, FILM COATED ORAL at 16:52

## 2024-03-17 RX ADMIN — CEFAZOLIN SODIUM 2000 MG: 2 SOLUTION INTRAVENOUS at 18:46

## 2024-03-17 RX ADMIN — CEFAZOLIN SODIUM 2000 MG: 2 SOLUTION INTRAVENOUS at 11:40

## 2024-03-17 RX ADMIN — HEPARIN SODIUM 5000 UNITS: 5000 INJECTION INTRAVENOUS; SUBCUTANEOUS at 18:50

## 2024-03-17 RX ADMIN — METRONIDAZOLE 500 MG: 500 TABLET ORAL at 13:54

## 2024-03-17 RX ADMIN — MUPIROCIN: 20 OINTMENT TOPICAL at 09:29

## 2024-03-17 RX ADMIN — ACETAMINOPHEN 975 MG: 325 TABLET ORAL at 09:26

## 2024-03-17 RX ADMIN — GABAPENTIN 300 MG: 300 CAPSULE ORAL at 21:15

## 2024-03-17 RX ADMIN — Medication 250 MG: at 09:28

## 2024-03-17 RX ADMIN — SEVELAMER HYDROCHLORIDE 800 MG: 800 TABLET, FILM COATED ORAL at 12:28

## 2024-03-17 RX ADMIN — OXYCODONE HYDROCHLORIDE 5 MG: 5 TABLET ORAL at 01:47

## 2024-03-17 RX ADMIN — NIFEDIPINE 30 MG: 30 TABLET, EXTENDED RELEASE ORAL at 09:27

## 2024-03-17 RX ADMIN — SEVELAMER HYDROCHLORIDE 800 MG: 800 TABLET, FILM COATED ORAL at 09:26

## 2024-03-17 RX ADMIN — FAMOTIDINE 10 MG: 20 TABLET, FILM COATED ORAL at 09:27

## 2024-03-17 RX ADMIN — HEPARIN SODIUM 5000 UNITS: 5000 INJECTION INTRAVENOUS; SUBCUTANEOUS at 01:41

## 2024-03-17 RX ADMIN — DOCUSATE SODIUM 100 MG: 100 CAPSULE, LIQUID FILLED ORAL at 18:50

## 2024-03-17 RX ADMIN — ACETAMINOPHEN 975 MG: 325 TABLET ORAL at 16:50

## 2024-03-17 RX ADMIN — METRONIDAZOLE 500 MG: 500 TABLET ORAL at 21:15

## 2024-03-17 RX ADMIN — INSULIN GLARGINE 15 UNITS: 100 INJECTION, SOLUTION SUBCUTANEOUS at 21:16

## 2024-03-17 RX ADMIN — NIFEDIPINE 30 MG: 30 TABLET, EXTENDED RELEASE ORAL at 18:49

## 2024-03-17 RX ADMIN — MUPIROCIN: 20 OINTMENT TOPICAL at 21:16

## 2024-03-17 RX ADMIN — MUPIROCIN 1 APPLICATION: 20 OINTMENT TOPICAL at 16:01

## 2024-03-17 RX ADMIN — INSULIN LISPRO 2 UNITS: 100 INJECTION, SOLUTION INTRAVENOUS; SUBCUTANEOUS at 21:16

## 2024-03-17 NOTE — PROGRESS NOTES
Naresh Carpio is a 63 y.o. male who is currently ordered Vancomycin IV with management by the Pharmacy Consult service.  Relevant clinical data and objective / subjective history reviewed.  Vancomycin Assessment:  Indication and Goal AUC/Trough: Other, Cellulitis, -600, trough >10  Clinical Status: stable  Micro:   MRSA negative, continue broad coverage  Renal Function:  SCr: 8.2 mg/dL (lab from 3/16/24)  CrCl: 11.8 mL/min  Renal replacement: HD  Days of Therapy: 6  Current Dose: 750mg IV post HD days (T-Th-Sat)  Vancomycin Plan:  No dose is planned for today.   Next Level: 3/23/24 @0600  Renal Function Monitoring: Daily BMP and UOP  Pharmacy will continue to follow closely for s/sx of nephrotoxicity, infusion reactions and appropriateness of therapy.  BMP and CBC will be ordered per protocol. We will continue to follow the patient’s culture results and clinical progress daily.    Do Radha Pedersen, Pharmacist

## 2024-03-17 NOTE — CONSULTS
Vancomycin IV Pharmacy-to-Dose Consultation     Vancomycin has been discontinued.  Pharmacy will sign off.  Please contact or re-consult with questions.    Do Radha Pedersen, Pharmacist

## 2024-03-17 NOTE — PROGRESS NOTES
[facility]  Progress Note  Name: Naresh Carpio I  MRN: [id number]  Unit/Bed#: [address] [place] [address] I Date of Admission: 3/12/2024   Date of Service: 3/17/2024 I Hospital Day: 5    Assessment/Plan   * Facial cellulitis  Assessment & Plan  Patient presents with acute onset right facial pain, with associated redness, swelling    CT face:Extensive superficial and deep soft tissue cellulitis involving multiple spaces in the right greater than left face and neck extending inferiorly to the level of thyroid cartilage as described. The caudal portion of the inflammatory change is not completely imaged. Visualized airways are patent. No abscess. 2.  Artifact from prior dental fillings prevents reliable evaluation for dental caries. There is no periapical disease. 3.  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  Hx: Pseudomonas/MRSA-negative  ED: Vancomycin/Rocephin  ABX: Vancomycin/cefepime-stopped 3/17 continue-metronidazole/cefazolin initiated  Wcx: follow cx nondrainable, adjust antibiotics as needed  U/S face:Cellulitis in the region of the right cheek without drainable organized fluid collection. However, there is a somewhat more focal area of subcutaneous fluid measuring approximately 2 cm, which could represent a developing collection   Will repeat ultrasound if no resolution 1-2-day  Tylenol scheduled with Dilaudid scheduled and as needed for breakthrough, home gabapentin  D/W ENT recs:   Add extended oral antibiotic coverage Flagyl  No need for repeat CT  Warm compress as tolerated/ No surgical plans at this time  Apply bactroban ointment to right cheek lesion BID  If the pain gets worse or swelling increases, can consider ultrasound to the right cheek/neck to eval for fluid collection; at this time, I feel that the condition continues to remain as cellulitis more consolidated to the right face now and improving.  Consider ID if worsening if  repeat ultrasound is not drainable 3/17            Electrolyte abnormality  Assessment & Plan  NA-130   K-5.4  Cl-94  We will replenish and monitor  D/W with nephro recs: HD planned    Type 2 diabetes mellitus, without long-term current use of insulin ([facility])  Assessment & Plan  Lab Results   Component Value Date    HGBA1C 5.4 03/12/2024     (P) [id number]  Repeat A1c-5.4%  Continue home Lantus 15 units subcu  SSI in place  Will adjust basal and prandial  Carb controlled diet    ESRD (end stage renal disease) ([facility])  Assessment & Plan  Lab Results   Component Value Date    EGFR 6 03/16/2024    EGFR 6 03/14/2024    EGFR 8 03/13/2024    CREATININE 8.20 (H) 03/16/2024    CREATININE 7.64 (H) 03/14/2024    CREATININE 6.09 (H) 03/13/2024     Continue home sevelamer  Avoid nephrotoxic's  Nephro following for HD management    Medical history non-contributory  Assessment & Plan  Chronic conditions, controlled with home medication    GERD-continue home famotidine 10 mg  HTN-continue home nifedipine 30 mg twice daily  HLD-continue home statin 80 mg  Gout-reevaluate home allopurinol  Neuropathy- gabapentin         VTE Pharmacologic Prophylaxis:   Moderate Risk (Score 3-4) - Pharmacological DVT Prophylaxis Ordered: heparin.    Mobility:   Basic Mobility Inpatient Raw Score: 24  JH-HLM Goal: 8: Walk 250 feet or more  JH-HLM Achieved: 8: Walk 250 feet ot more  HLM Goal NOT achieved. Continue with multidisciplinary rounding and encourage appropriate mobility to improve upon HLM goals.    Patient Centered Rounds: I performed bedside rounds with nursing staff today.   Discussions with Specialists or Other Care Team Provider: ENT, nephrology    Education and Discussions with Family / Patient: Patient declined call to .     Total Time Spent on Date of Encounter in care of patient: 40 mins. This time was spent on one or more of the following: performing physical exam; counseling and coordination of care; obtaining  or reviewing history; documenting in the medical record; reviewing/ordering tests, medications or procedures; communicating with other healthcare professionals and discussing with patient's family/caregivers.    Current Length of Stay: 5 day(s)  Current Patient Status: Inpatient   Certification Statement: The patient will continue to require additional inpatient hospital stay due to IV antibiotics and specialist recs  Discharge Plan: Anticipate discharge tomorrow to home.    Code Status: Level 1 - Full Code    Subjective:   Patient seen and examined at bedside with nurse present, reported no headache, blurry vision, stated he felt unwell and would not be ready to go tomorrow, could not identify feeling of unwellness.  Denied fever or chills, chest pain or shortness of breath.  Reported improvement in facial pain informed of change in antibiotics and ultrasound repeat in a.m.  Encouraged to continue heating pad as previously directed    Objective:     Vitals:   Temp (24hrs), Av.8 °F (36.6 °C), Min:97.5 °F (36.4 °C), Max:98 °F (36.7 °C)    Temp:  [97.5 °F (36.4 °C)-98 °F (36.7 °C)] 97.9 °F (36.6 °C)  HR:  [65-80] 68  Resp:  [18] 18  BP: (133-143)/(60-82) 142/63  SpO2:  [92 %-97 %] 97 %  There is no height or weight on file to calculate BMI.     Input and Output Summary (last 24 hours):   No intake or output data in the 24 hours ending 24 1432        Physical Exam  Vitals and nursing note reviewed.   Constitutional:       General: He is not in acute distress.     Appearance: He is well-developed.   HENT:      Head: Normocephalic and atraumatic.      Comments: Slow resolution, warmth, erythema, hyperpigmentation around the wound, fluctuance on facial cheek right  Eyes:      Conjunctiva/sclera: Conjunctivae normal.   Cardiovascular:      Rate and Rhythm: Normal rate and regular rhythm.      Heart sounds: No murmur heard.  Pulmonary:      Effort: Pulmonary effort is normal. No respiratory distress.      Breath  sounds: Normal breath sounds. No wheezing or rhonchi.   Abdominal:      Palpations: Abdomen is soft.      Tenderness: There is no abdominal tenderness.   Musculoskeletal:         General: No swelling.      Cervical back: Neck supple.   Skin:     General: Skin is warm and dry.      Capillary Refill: Capillary refill takes less than 2 seconds.   Neurological:      Mental Status: He is alert and oriented to person, place, and time.   Psychiatric:         Mood and Affect: Mood normal.          Additional Data:     Labs:  Results from last 7 days   Lab Units 03/17/24  0613   WBC Thousand/uL 10.20*   HEMOGLOBIN g/dL 11.1*   HEMATOCRIT % 35.5*   PLATELETS Thousands/uL 270   NEUTROS PCT % 67   LYMPHS PCT % 13*   MONOS PCT % 12   EOS PCT % 6     Results from last 7 days   Lab Units 03/16/24  0541 03/13/24  0505 03/12/24  0939   SODIUM mmol/L 133*   < > 130*   POTASSIUM mmol/L 4.6   < > 5.4*   CHLORIDE mmol/L 97   < > 94*   CO2 mmol/L 23   < > 25   BUN mg/dL 58*   < > 74*   CREATININE mg/dL 8.20*   < > 9.09*   ANION GAP mmol/L 13   < > 11   CALCIUM mg/dL 7.9*   < > 8.5   ALBUMIN g/dL  --   --  3.6   TOTAL BILIRUBIN mg/dL  --   --  0.42   ALK PHOS U/L  --   --  94   ALT U/L  --   --  25   AST U/L  --   --  44*   GLUCOSE RANDOM mg/dL 85   < > 110    < > = values in this interval not displayed.         Results from last 7 days   Lab Units 03/17/24  1153 03/17/24  0835 03/16/24  1606 03/16/24  1121 03/16/24  0714 03/15/24  2023 03/15/24  1609 03/15/24  1057 03/15/24  0720 03/14/24  2134 03/14/24  1548 03/14/24  1046   POC GLUCOSE mg/dl 86 81 166* 110 94 146* 91 118 86 95 146* 99     Results from last 7 days   Lab Units 03/12/24  0939   HEMOGLOBIN A1C % 5.4     Results from last 7 days   Lab Units 03/12/24  0939   LACTIC ACID mmol/L 0.7       Lines/Drains:  Invasive Devices       Peripheral Intravenous Line  Duration             Peripheral IV 03/14/24 Left;Ventral (anterior) Forearm 2 days              Hemodialysis Catheter   Duration             HD Permanent Double Catheter 200 days                    Imaging: Reviewed radiology reports from this admission including: Facial CT    Recent Cultures (last 7 days):   Results from last 7 days   Lab Units 03/12/24  1009   BLOOD CULTURE  No Growth After 5 Days.  No Growth After 5 Days.       Last 24 Hours Medication List:   Current Facility-Administered Medications   Medication Dose Route Frequency Provider Last Rate    acetaminophen  975 mg Oral Q8H [facility] Yolandefiorella CyndiGRANT briggs      atorvastatin  80 mg Oral Daily With Dinner Angie Bernard MD      cefazolin  2,000 mg Intravenous Q8H Angie Bernard MD 2,000 mg (03/17/24 1140)    docusate sodium  100 mg Oral BID Angie Bernard MD      famotidine  10 mg Oral Daily Angie Bernard MD      gabapentin  300 mg Oral TID Angie Bernard MD      heparin (porcine)  5,000 Units Subcutaneous Q8H KEMAL Bernard MD      HYDROmorphone  1 mg Intravenous Q4H PRN Angie Bernard MD      insulin glargine  15 Units Subcutaneous HS Angie Bernard MD      insulin lispro  1-6 Units Subcutaneous TID AC Angie Bernard MD      insulin lispro  1-6 Units Subcutaneous HS Niharika Russell PA-C      insulin lispro  3 Units Subcutaneous TID With Meals Angie Bernard MD      metroNIDAZOLE  500 mg Oral Q8H [facility] Angie Bernard MD      mupirocin   Topical TID Angie Bernard MD      NIFEdipine  30 mg Oral 2 times per day on Sunday Monday Wednesday Friday Kalli Mcbride PA-C      NIFEdipine  30 mg Oral Once per day on Tuesday Thursday Saturday Kalli Mcbride PA-C      ondansetron  4 mg Intravenous Q6H PRN Angie Bernard MD      oxyCODONE  5 mg Oral Q6H PRN Angie Bernard MD      polyethylene glycol  17 g Oral Daily Angie Bernard MD      saccharomyces boulardii  250 mg Oral BID Angie Bernard MD      sevelamer  800 mg Oral TID With Meals Angie Bernard MD          Today, Patient Was Seen By: Angie Bernard MD    **Please Note: This note may have been  constructed using a voice recognition system.**

## 2024-03-17 NOTE — PLAN OF CARE
Problem: METABOLIC, FLUID AND ELECTROLYTES - ADULT  Goal: Electrolytes maintained within normal limits  Description: INTERVENTIONS:  - Monitor labs and assess patient for signs and symptoms of electrolyte imbalances  - Administer electrolyte replacement as ordered  - Monitor response to electrolyte replacements, including repeat lab results as appropriate  - Instruct patient on fluid and nutrition as appropriate  Outcome: Progressing     Problem: PAIN - ADULT  Goal: Verbalizes/displays adequate comfort level or baseline comfort level  Description: Interventions:  - Encourage patient to monitor pain and request assistance  - Assess pain using appropriate pain scale  - Administer analgesics based on type and severity of pain and evaluate response  - Implement non-pharmacological measures as appropriate and evaluate response  - Consider cultural and social influences on pain and pain management  - Notify physician/advanced practitioner if interventions unsuccessful or patient reports new pain  Outcome: Progressing     Problem: INFECTION - ADULT  Goal: Absence or prevention of progression during hospitalization  Description: INTERVENTIONS:  - Assess and monitor for signs and symptoms of infection  - Monitor lab/diagnostic results  - Monitor all insertion sites, i.e. indwelling lines, tubes, and drains  - Monitor endotracheal if appropriate and nasal secretions for changes in amount and color  - [place] appropriate cooling/warming therapies per order  - Administer medications as ordered  - Instruct and encourage patient and family to use good hand hygiene technique  - Identify and instruct in appropriate isolation precautions for identified infection/condition  Outcome: Progressing     Problem: Knowledge Deficit  Goal: Patient/family/caregiver demonstrates understanding of disease process, treatment plan, medications, and discharge instructions  Description: Complete learning assessment and assess knowledge  base.  Interventions:  - Provide teaching at level of understanding  - Provide teaching via preferred learning methods  Outcome: Progressing

## 2024-03-17 NOTE — PLAN OF CARE
Problem: Potential for Falls  Goal: Patient will remain free of falls  Description: INTERVENTIONS:  - Educate patient/family on patient safety including physical limitations  - Instruct patient to call for assistance with activity   - Consult OT/PT to assist with strengthening/mobility   - Keep Call bell within reach  - Keep bed low and locked with side rails adjusted as appropriate  - Keep care items and personal belongings within reach  - Initiate and maintain comfort rounds  - Make Fall Risk Sign visible to staff  - Offer Toileting every 2 Hours, in advance of need  - Initiate/Maintain bed/chair alarm  - O  - Apply yellow socks and bracelet for high fall risk patients  - Consider moving patient to room near nurses station  Outcome: Progressing     Problem: METABOLIC, FLUID AND ELECTROLYTES - ADULT  Goal: Electrolytes maintained within normal limits  Description: INTERVENTIONS:  - Monitor labs and assess patient for signs and symptoms of electrolyte imbalances  - Administer electrolyte replacement as ordered  - Monitor response to electrolyte replacements, including repeat lab results as appropriate  - Instruct patient on fluid and nutrition as appropriate  Outcome: Progressing  Goal: Fluid balance maintained  Description: INTERVENTIONS:  - Monitor labs   - Monitor I/O and WT  - Instruct patient on fluid and nutrition as appropriate  - Assess for signs & symptoms of volume excess or deficit  Outcome: Progressing  Goal: Glucose maintained within target range  Description: INTERVENTIONS:  - Monitor Blood Glucose as ordered  - Assess for signs and symptoms of hyperglycemia and hypoglycemia  - Administer ordered medications to maintain glucose within target range  - Assess nutritional intake and initiate nutrition service referral as needed  Outcome: Progressing     Problem: PAIN - ADULT  Goal: Verbalizes/displays adequate comfort level or baseline comfort level  Description: Interventions:  - Encourage patient to  monitor pain and request assistance  - Assess pain using appropriate pain scale  - Administer analgesics based on type and severity of pain and evaluate response  - Implement non-pharmacological measures as appropriate and evaluate response  - Consider cultural and social influences on pain and pain management  - Notify physician/advanced practitioner if interventions unsuccessful or patient reports new pain  Outcome: Progressing     Problem: INFECTION - ADULT  Goal: Absence or prevention of progression during hospitalization  Description: INTERVENTIONS:  - Assess and monitor for signs and symptoms of infection  - Monitor lab/diagnostic results  - Monitor all insertion sites, i.e. indwelling lines, tubes, and drains  - Monitor endotracheal if appropriate and nasal secretions for changes in amount and color  - [place] appropriate cooling/warming therapies per order  - Administer medications as ordered  - Instruct and encourage patient and family to use good hand hygiene technique  - Identify and instruct in appropriate isolation precautions for identified infection/condition  Outcome: Progressing     Problem: SAFETY ADULT  Goal: Patient will remain free of falls  Description: INTERVENTIONS:  - Educate patient/family on patient safety including physical limitations  - Instruct patient to call for assistance with activity   - Consult OT/PT to assist with strengthening/mobility   - Keep Call bell within reach  - Keep bed low and locked with side rails adjusted as appropriate  - Keep care items and personal belongings within reach  - Initiate and maintain comfort rounds  - Make Fall Risk Sign visible to staff  - Offer Toileting every 2 Hours, in advance of need  - Initiate/Maintain bed/chair alarm  - O  - Apply yellow socks and bracelet for high fall risk patients  - Consider moving patient to room near nurses station  Outcome: Progressing     Problem: DISCHARGE PLANNING  Goal: Discharge to home or other facility with  appropriate resources  Description: INTERVENTIONS:  - Identify barriers to discharge w/patient and caregiver  - Arrange for needed discharge resources and transportation as appropriate  - Identify discharge learning needs (meds, wound care, etc.)  - Arrange for interpretive services to assist at discharge as needed  - Refer to Case Management Department for coordinating discharge planning if the patient needs post-hospital services based on physician/advanced practitioner order or complex needs related to functional status, cognitive ability, or social support system  Outcome: Progressing     Problem: Knowledge Deficit  Goal: Patient/family/caregiver demonstrates understanding of disease process, treatment plan, medications, and discharge instructions  Description: Complete learning assessment and assess knowledge base.  Interventions:  - Provide teaching at level of understanding  - Provide teaching via preferred learning methods  Outcome: Progressing

## 2024-03-18 ENCOUNTER — APPOINTMENT (INPATIENT)
Dept: RADIOLOGY | Facility: HOSPITAL | Age: 64
DRG: 602 | End: 2024-03-18
Payer: MEDICARE

## 2024-03-18 PROBLEM — Z89.419 HISTORY OF AMPUTATION OF HALLUX (HCC): Status: ACTIVE | Noted: 2024-03-18

## 2024-03-18 LAB
ANION GAP SERPL CALCULATED.3IONS-SCNC: 11 MMOL/L (ref 4–13)
BASOPHILS # BLD AUTO: 0.05 THOUSANDS/ÂΜL (ref 0–0.1)
BASOPHILS NFR BLD AUTO: 1 % (ref 0–1)
BUN SERPL-MCNC: 47 MG/DL (ref 5–25)
CALCIUM SERPL-MCNC: 7.8 MG/DL (ref 8.4–10.2)
CHLORIDE SERPL-SCNC: 96 MMOL/L (ref 96–108)
CO2 SERPL-SCNC: 25 MMOL/L (ref 21–32)
CREAT SERPL-MCNC: 7.74 MG/DL (ref 0.6–1.3)
EOSINOPHIL # BLD AUTO: 0.7 THOUSAND/ÂΜL (ref 0–0.61)
EOSINOPHIL NFR BLD AUTO: 7 % (ref 0–6)
ERYTHROCYTE [DISTWIDTH] IN BLOOD BY AUTOMATED COUNT: 18.1 % (ref 11.6–15.1)
GFR SERPL CREATININE-BSD FRML MDRD: 6 ML/MIN/1.73SQ M
GLUCOSE SERPL-MCNC: 104 MG/DL (ref 65–140)
GLUCOSE SERPL-MCNC: 91 MG/DL (ref 65–140)
GLUCOSE SERPL-MCNC: 96 MG/DL (ref 65–140)
GLUCOSE SERPL-MCNC: 98 MG/DL (ref 65–140)
GLUCOSE SERPL-MCNC: 98 MG/DL (ref 65–140)
HCT VFR BLD AUTO: 34.1 % (ref 36.5–49.3)
HGB BLD-MCNC: 10.8 G/DL (ref 12–17)
IMM GRANULOCYTES # BLD AUTO: 0.16 THOUSAND/UL (ref 0–0.2)
IMM GRANULOCYTES NFR BLD AUTO: 2 % (ref 0–2)
LYMPHOCYTES # BLD AUTO: 1.24 THOUSANDS/ÂΜL (ref 0.6–4.47)
LYMPHOCYTES NFR BLD AUTO: 13 % (ref 14–44)
MCH RBC QN AUTO: 29.9 PG (ref 26.8–34.3)
MCHC RBC AUTO-ENTMCNC: 31.7 G/DL (ref 31.4–37.4)
MCV RBC AUTO: 95 FL (ref 82–98)
MONOCYTES # BLD AUTO: 1.02 THOUSAND/ÂΜL (ref 0.17–1.22)
MONOCYTES NFR BLD AUTO: 10 % (ref 4–12)
NEUTROPHILS # BLD AUTO: 6.71 THOUSANDS/ÂΜL (ref 1.85–7.62)
NEUTS SEG NFR BLD AUTO: 67 % (ref 43–75)
NRBC BLD AUTO-RTO: 0 /100 WBCS
PLATELET # BLD AUTO: 276 THOUSANDS/UL (ref 149–390)
PMV BLD AUTO: 9.5 FL (ref 8.9–12.7)
POTASSIUM SERPL-SCNC: 4.4 MMOL/L (ref 3.5–5.3)
RBC # BLD AUTO: 3.61 MILLION/UL (ref 3.88–5.62)
SODIUM SERPL-SCNC: 132 MMOL/L (ref 135–147)
WBC # BLD AUTO: 9.88 THOUSAND/UL (ref 4.31–10.16)

## 2024-03-18 PROCEDURE — 80048 BASIC METABOLIC PNL TOTAL CA: CPT | Performed by: INTERNAL MEDICINE

## 2024-03-18 PROCEDURE — 87205 SMEAR GRAM STAIN: CPT | Performed by: STUDENT IN AN ORGANIZED HEALTH CARE EDUCATION/TRAINING PROGRAM

## 2024-03-18 PROCEDURE — 0HBNXZZ EXCISION OF LEFT FOOT SKIN, EXTERNAL APPROACH: ICD-10-PCS | Performed by: PODIATRIST

## 2024-03-18 PROCEDURE — NC001 PR NO CHARGE: Performed by: PODIATRIST

## 2024-03-18 PROCEDURE — 87077 CULTURE AEROBIC IDENTIFY: CPT | Performed by: STUDENT IN AN ORGANIZED HEALTH CARE EDUCATION/TRAINING PROGRAM

## 2024-03-18 PROCEDURE — 99232 SBSQ HOSP IP/OBS MODERATE 35: CPT | Performed by: STUDENT IN AN ORGANIZED HEALTH CARE EDUCATION/TRAINING PROGRAM

## 2024-03-18 PROCEDURE — 87070 CULTURE OTHR SPECIMN AEROBIC: CPT | Performed by: STUDENT IN AN ORGANIZED HEALTH CARE EDUCATION/TRAINING PROGRAM

## 2024-03-18 PROCEDURE — 82948 REAGENT STRIP/BLOOD GLUCOSE: CPT

## 2024-03-18 PROCEDURE — 85025 COMPLETE CBC W/AUTO DIFF WBC: CPT | Performed by: STUDENT IN AN ORGANIZED HEALTH CARE EDUCATION/TRAINING PROGRAM

## 2024-03-18 PROCEDURE — 87186 SC STD MICRODIL/AGAR DIL: CPT | Performed by: STUDENT IN AN ORGANIZED HEALTH CARE EDUCATION/TRAINING PROGRAM

## 2024-03-18 PROCEDURE — 76536 US EXAM OF HEAD AND NECK: CPT

## 2024-03-18 PROCEDURE — 99221 1ST HOSP IP/OBS SF/LOW 40: CPT | Performed by: PODIATRIST

## 2024-03-18 PROCEDURE — 11056 PARNG/CUTG B9 HYPRKR LES 2-4: CPT | Performed by: PODIATRIST

## 2024-03-18 RX ADMIN — CEFAZOLIN SODIUM 2000 MG: 2 SOLUTION INTRAVENOUS at 01:08

## 2024-03-18 RX ADMIN — NIFEDIPINE 30 MG: 30 TABLET, EXTENDED RELEASE ORAL at 17:41

## 2024-03-18 RX ADMIN — MUPIROCIN: 20 OINTMENT TOPICAL at 21:05

## 2024-03-18 RX ADMIN — HEPARIN SODIUM 5000 UNITS: 5000 INJECTION INTRAVENOUS; SUBCUTANEOUS at 01:07

## 2024-03-18 RX ADMIN — NIFEDIPINE 30 MG: 30 TABLET, EXTENDED RELEASE ORAL at 10:52

## 2024-03-18 RX ADMIN — Medication 250 MG: at 17:41

## 2024-03-18 RX ADMIN — SEVELAMER HYDROCHLORIDE 800 MG: 800 TABLET, FILM COATED ORAL at 08:05

## 2024-03-18 RX ADMIN — GABAPENTIN 300 MG: 300 CAPSULE ORAL at 16:41

## 2024-03-18 RX ADMIN — HYDROMORPHONE HYDROCHLORIDE 1 MG: 1 INJECTION, SOLUTION INTRAMUSCULAR; INTRAVENOUS; SUBCUTANEOUS at 01:09

## 2024-03-18 RX ADMIN — MUPIROCIN: 20 OINTMENT TOPICAL at 16:44

## 2024-03-18 RX ADMIN — SEVELAMER HYDROCHLORIDE 800 MG: 800 TABLET, FILM COATED ORAL at 16:41

## 2024-03-18 RX ADMIN — OXYCODONE HYDROCHLORIDE 5 MG: 5 TABLET ORAL at 05:00

## 2024-03-18 RX ADMIN — INSULIN LISPRO 3 UNITS: 100 INJECTION, SOLUTION INTRAVENOUS; SUBCUTANEOUS at 08:05

## 2024-03-18 RX ADMIN — ACETAMINOPHEN 975 MG: 325 TABLET ORAL at 16:48

## 2024-03-18 RX ADMIN — HYDROMORPHONE HYDROCHLORIDE 1 MG: 1 INJECTION, SOLUTION INTRAMUSCULAR; INTRAVENOUS; SUBCUTANEOUS at 08:17

## 2024-03-18 RX ADMIN — GABAPENTIN 300 MG: 300 CAPSULE ORAL at 10:51

## 2024-03-18 RX ADMIN — INSULIN LISPRO 3 UNITS: 100 INJECTION, SOLUTION INTRAVENOUS; SUBCUTANEOUS at 12:09

## 2024-03-18 RX ADMIN — ATORVASTATIN CALCIUM 80 MG: 40 TABLET, FILM COATED ORAL at 16:42

## 2024-03-18 RX ADMIN — MUPIROCIN: 20 OINTMENT TOPICAL at 10:53

## 2024-03-18 RX ADMIN — METRONIDAZOLE 500 MG: 500 TABLET ORAL at 05:00

## 2024-03-18 RX ADMIN — CEFAZOLIN SODIUM 2000 MG: 2 SOLUTION INTRAVENOUS at 17:40

## 2024-03-18 RX ADMIN — SEVELAMER HYDROCHLORIDE 800 MG: 800 TABLET, FILM COATED ORAL at 12:08

## 2024-03-18 RX ADMIN — HEPARIN SODIUM 5000 UNITS: 5000 INJECTION INTRAVENOUS; SUBCUTANEOUS at 10:56

## 2024-03-18 RX ADMIN — FAMOTIDINE 10 MG: 20 TABLET, FILM COATED ORAL at 10:56

## 2024-03-18 RX ADMIN — METRONIDAZOLE 500 MG: 500 TABLET ORAL at 21:05

## 2024-03-18 RX ADMIN — ACETAMINOPHEN 975 MG: 325 TABLET ORAL at 01:07

## 2024-03-18 RX ADMIN — ACETAMINOPHEN 975 MG: 325 TABLET ORAL at 10:51

## 2024-03-18 RX ADMIN — GABAPENTIN 300 MG: 300 CAPSULE ORAL at 21:05

## 2024-03-18 RX ADMIN — METRONIDAZOLE 500 MG: 500 TABLET ORAL at 14:29

## 2024-03-18 RX ADMIN — Medication 250 MG: at 10:50

## 2024-03-18 RX ADMIN — INSULIN GLARGINE 15 UNITS: 100 INJECTION, SOLUTION SUBCUTANEOUS at 21:05

## 2024-03-18 RX ADMIN — HEPARIN SODIUM 5000 UNITS: 5000 INJECTION INTRAVENOUS; SUBCUTANEOUS at 17:42

## 2024-03-18 RX ADMIN — INSULIN LISPRO 3 UNITS: 100 INJECTION, SOLUTION INTRAVENOUS; SUBCUTANEOUS at 16:48

## 2024-03-18 RX ADMIN — CEFAZOLIN SODIUM 2000 MG: 2 SOLUTION INTRAVENOUS at 10:53

## 2024-03-18 NOTE — PLAN OF CARE
Problem: Potential for Falls  Goal: Patient will remain free of falls  Description: INTERVENTIONS:  - Educate patient/family on patient safety including physical limitations  - Instruct patient to call for assistance with activity   - Consult OT/PT to assist with strengthening/mobility   - Keep Call bell within reach  - Keep bed low and locked with side rails adjusted as appropriate  - Keep care items and personal belongings within reach  - Initiate and maintain comfort rounds  - Make Fall Risk Sign visible to staff  - Offer Toileting every 2 Hours, in advance of need  - Initiate/Maintain bed/chair alarm  - O  - Apply yellow socks and bracelet for high fall risk patients  - Consider moving patient to room near nurses station  Outcome: Progressing     Problem: METABOLIC, FLUID AND ELECTROLYTES - ADULT  Goal: Electrolytes maintained within normal limits  Description: INTERVENTIONS:  - Monitor labs and assess patient for signs and symptoms of electrolyte imbalances  - Administer electrolyte replacement as ordered  - Monitor response to electrolyte replacements, including repeat lab results as appropriate  - Instruct patient on fluid and nutrition as appropriate  Outcome: Progressing  Goal: Fluid balance maintained  Description: INTERVENTIONS:  - Monitor labs   - Monitor I/O and WT  - Instruct patient on fluid and nutrition as appropriate  - Assess for signs & symptoms of volume excess or deficit  Outcome: Progressing  Goal: Glucose maintained within target range  Description: INTERVENTIONS:  - Monitor Blood Glucose as ordered  - Assess for signs and symptoms of hyperglycemia and hypoglycemia  - Administer ordered medications to maintain glucose within target range  - Assess nutritional intake and initiate nutrition service referral as needed  Outcome: Progressing     Problem: PAIN - ADULT  Goal: Verbalizes/displays adequate comfort level or baseline comfort level  Description: Interventions:  - Encourage patient to  monitor pain and request assistance  - Assess pain using appropriate pain scale  - Administer analgesics based on type and severity of pain and evaluate response  - Implement non-pharmacological measures as appropriate and evaluate response  - Consider cultural and social influences on pain and pain management  - Notify physician/advanced practitioner if interventions unsuccessful or patient reports new pain  Outcome: Progressing     Problem: INFECTION - ADULT  Goal: Absence or prevention of progression during hospitalization  Description: INTERVENTIONS:  - Assess and monitor for signs and symptoms of infection  - Monitor lab/diagnostic results  - Monitor all insertion sites, i.e. indwelling lines, tubes, and drains  - Monitor endotracheal if appropriate and nasal secretions for changes in amount and color  - [place] appropriate cooling/warming therapies per order  - Administer medications as ordered  - Instruct and encourage patient and family to use good hand hygiene technique  - Identify and instruct in appropriate isolation precautions for identified infection/condition  Outcome: Progressing     Problem: SAFETY ADULT  Goal: Patient will remain free of falls  Description: INTERVENTIONS:  - Educate patient/family on patient safety including physical limitations  - Instruct patient to call for assistance with activity   - Consult OT/PT to assist with strengthening/mobility   - Keep Call bell within reach  - Keep bed low and locked with side rails adjusted as appropriate  - Keep care items and personal belongings within reach  - Initiate and maintain comfort rounds  - Make Fall Risk Sign visible to staff  - Offer Toileting every 2 Hours, in advance of need  - Initiate/Maintain bed/chair alarm  - O  - Apply yellow socks and bracelet for high fall risk patients  - Consider moving patient to room near nurses station  Outcome: Progressing     Problem: DISCHARGE PLANNING  Goal: Discharge to home or other facility with  appropriate resources  Description: INTERVENTIONS:  - Identify barriers to discharge w/patient and caregiver  - Arrange for needed discharge resources and transportation as appropriate  - Identify discharge learning needs (meds, wound care, etc.)  - Arrange for interpretive services to assist at discharge as needed  - Refer to Case Management Department for coordinating discharge planning if the patient needs post-hospital services based on physician/advanced practitioner order or complex needs related to functional status, cognitive ability, or social support system  Outcome: Progressing     Problem: Knowledge Deficit  Goal: Patient/family/caregiver demonstrates understanding of disease process, treatment plan, medications, and discharge instructions  Description: Complete learning assessment and assess knowledge base.  Interventions:  - Provide teaching at level of understanding  - Provide teaching via preferred learning methods  Outcome: Progressing

## 2024-03-18 NOTE — CONSULTS
Consult - Podiatry   Naresh Carpio 63 y.o. male MRN: [id number]  Unit/Bed#: [address] [place] [address] Encounter: [id number]    Assessment/Plan     Assessment:  Diabetic patient with severe peripheral neuropathy bilateral foot.  Charcot foot.  Preulcerative calluses.  History of multiple digital amputations.  No evidence of peripheral artery disease.  Plan:  Chart reviewed.  Radiographic studies reviewed.  Hospital notes reviewed.  Patient seen at bedside.  Diabetic foot exam performed.  At this time patient is high risk foot.  He has preulcerative calluses on the plantar aspect of his foot bilateral left greater than right.  These were reduced without pain or complication.  Utilizing 10 blade lesions left foot, submetatarsal 1 and 5 were reduced.  No evidence of infection.  At this time we recommend patient utilize custom molded foot and ankle orthotic and inlays.  He will consider obtaining as outpatient.  Patient has a outside facility podiatrist that he will follow with.    History of Present Illness     HPI:  Naresh Carpio is a 63 y.o. male who presents with patient is concerned about calluses of his feet.  He is diabetic whose had calluses before that turned into infection that required amputation..    Consults  Review of Systems   Constitutional: Negative.    HENT: Negative.    Eyes: Negative.    Respiratory: Negative.    Cardiovascular: Negative.    Gastrointestinal: Negative.    Musculoskeletal: Pes cavus noted right foot with pes planus Charcot foot left.  Skin: All remaining 8 nails are dystrophic.  They are thick.  He has preulcerative callus/ulcer submetatarsal 1 and 5 left foot.  Neurological: Negative.  Profound hypoesthesia of the foot bilateral.  Psych: negative.       Historical Information   Past Medical History:   Diagnosis Date    CKD     Diabetes mellitus (HCC)     Hyperlipidemia     Hypertension     Left toe amputee (HCC)     TIA (transient ischemic attack)      Past Surgical History:   Procedure  Laterality Date    AMPUTATION Left     left foot resection 10 years ago dr tran    IR LOWER EXTREMITY ANGIOGRAM  08/29/2023    IR TEMPORARY DIALYSIS CATHETER PLACEMENT  08/25/2023    IR TUNNELED CENTRAL LINE REMOVAL  08/23/2023    IR TUNNELED DIALYSIS CATHETER PLACEMENT  01/27/2022    IR TUNNELED DIALYSIS CATHETER PLACEMENT  08/30/2023    RI AMPUTATION METATARSAL W/TOE SINGLE Right 8/31/2023    Procedure: RIGHT PARTIAL 1ST RAY RESECTION WITH  WOUND VAC APPLICATION;  Surgeon: Won Parmar DPM;  Location: [facility];  Service: Podiatry     Social History   Social History     Substance and Sexual Activity   Alcohol Use Not Currently    Alcohol/week: 0.0 standard drinks of alcohol    Comment: 0     Social History     Substance and Sexual Activity   Drug Use Never     Social History     Tobacco Use   Smoking Status Never    Passive exposure: Never   Smokeless Tobacco Never     Family History: History reviewed. No pertinent family history.    Meds/Allergies   Medications Prior to Admission   Medication    docusate sodium (COLACE) 100 mg capsule    NIFEdipine ER (ADALAT CC) 30 MG 24 hr tablet    sevelamer (RENAGEL) 800 mg tablet    acetaminophen (TYLENOL) 325 mg tablet    allopurinol (ZYLOPRIM) 100 mg tablet    atorvastatin (LIPITOR) 80 mg tablet    famotidine (PEPCID) 10 mg tablet    insulin glargine (LANTUS) 100 units/mL subcutaneous injection    insulin lispro (HumaLOG) 100 units/mL injection    lidocaine (LIDODERM) 5 %    methocarbamol (ROBAXIN) 500 mg tablet    naloxone (NARCAN) 4 mg/0.1 mL nasal spray    polyethylene glycol (MIRALAX) 17 g packet    senna-docusate sodium (SENOKOT S) 8.6-50 mg per tablet     No Known Allergies    Objective   First Vitals:   Blood Pressure: (!) 176/73 (03/12/24 0924)  Pulse: 79 (03/12/24 0924)  Temperature: 99.8 °F (37.7 °C) (03/12/24 0924)  Temp Source: Oral (03/12/24 0924)  Respirations: 18 (03/12/24 0924)  SpO2: 99 % (03/12/24 0924)    Current Vitals:   Blood Pressure: (!)  "185/76 (03/18/24 1847)  Pulse: 74 (03/18/24 1847)  Temperature: 97.9 °F (36.6 °C) (03/18/24 1847)  Temp Source: Oral (03/18/24 1847)  Respirations: 18 (03/18/24 1847)  SpO2: 96 % (03/18/24 1847)        BP (!) 185/76 (BP Location: Right arm)   Pulse 74   Temp 97.9 °F (36.6 °C) (Oral)   Resp 18   SpO2 96%      General Appearance:    Alert, cooperative, no distress   Head:    Normocephalic, without obvious abnormality, atraumatic   Eyes:    PERRL, conjunctiva/corneas clear, EOM's intact        Nose:   Moist mucous membranes   Neck:   Supple, symmetrical, trachea midline   Back:     Symmetric   Lungs:     Respirations unlabored   Heart:    Regular rate and rhythm, S1 and S2 normal, no murmur, rub   or gallop   Abdomen:     Soft, non-tender   Extremities: Patient demonstrates right hallux amputation and fifth left toe amputations.  Operative sites are coapted.  Patient has Charcot change of the left foot.  This is noted clinically on x-ray.  He has preulcerative callus submetatarsal 1 and 5.  No evidence of abscess or cellulitis at this time.   Pulses: Pulses noted bilateral with arterial Doppler testing demonstrating adequate perfusion to the foot.   Skin: Patient has thickened turgor of skin secondary to lichenification.  He has chronic edema.  All nails are dystrophic.   Neurologic:   Gross sensation is significantly diminished.. Protective sensation is absent.           Lab Results:   No results displayed because visit has over 200 results.                Results from last 7 days   Lab Units 03/12/24  1009   BLOOD CULTURE  No Growth After 5 Days.  No Growth After 5 Days.       Invalid input(s): \"LABAEARO\"            Imaging: I have personally reviewed pertinent films in PACS  EKG, Pathology, and Other Studies: I have personally reviewed pertinent reports.      Code Status: Level 1 - Full Code  Advance Directive and Living Will:      Power of :    POLST:          Counseling and Coordination of care  I " spent 35 minutes face-to-face with patient today. More than 50% was spent in counseling & coordination of care. Counseled patient regarding nature of diabetic foot need to watch for infection..

## 2024-03-18 NOTE — PROGRESS NOTES
NEPHROLOGY PROGRESS NOTE   Naresh Carpio 63 y.o. male MRN: [id number]  Unit/Bed#: [address] [place] [address]- Encounter: [id number]  Reason for Consult: Management of ESRD    ASSESSMENT AND PLAN:  62 yo man with PMH day of ESRD on HD TTS p/w facial cellulitis.  Nephrology is consulted for management of ESRD    PLAN:    #ESRD on HD TTS:  Dialysis unit/days: [facility]  Access: Right IJ PermCath  Patient had last dialysis on Saturday, well-tolerated  Plan to continue to use per schedule, next dialysis tomorrow  Renal Diet  Fluid restriction 1-1.5L/d  Adjust medications to GFR<10  Avoid opioids     #Volume status/hypertension:  Volume: Euvolemic  Blood pressure: Borderline hypertension, /75  Low-sodium diet  UF on HD   kg  Nifedipine 30 mg twice daily    #Secondary Hyperparasitoidism   Continue sevelamer with meals    # Anemia of Kidney Disease  Current hemoglobin: 10.8 mg/dL  Treatment:  On Mircera as an outpatient  Transfuse for hemoglobin less than 7.0 per primary service      # Facial cellulitis  Right facial edema  Antibiotics as per primary team  Currently on metronidazole on cefazolin    SUBJECTIVE:  Patient seen and examined at bedside. No chest pain, shortness of breath, nausea,    OBJECTIVE:  Current Weight:    Vitals:    03/17/24 2304   BP: 142/75   Pulse: 68   Resp: 17   Temp: 97.8 °F (36.6 °C)   SpO2: 95%       Intake/Output Summary (Last 24 hours) at 3/18/2024 0804  Last data filed at 3/17/2024 0900  Gross per 24 hour   Intake 180 ml   Output --   Net 180 ml     Wt Readings from Last 3 Encounters:   01/24/24 109 kg (240 lb)   09/13/23 107 kg (235 lb)   08/24/23 115 kg (253 lb)     Temp Readings from Last 3 Encounters:   03/17/24 97.8 °F (36.6 °C) (Oral)   01/24/24 98 °F (36.7 °C) (Oral)   09/06/23 97.7 °F (36.5 °C) (Oral)     BP Readings from Last 3 Encounters:   03/17/24 142/75   01/24/24 (!) 194/84   09/13/23 136/76     Pulse Readings from Last 3 Encounters:   03/17/24 68   01/24/24 82   09/13/23 64         General:  no acute distress at this time  Skin:  No acute rash  Eyes:  No scleral icterus and noninjected  ENT:   Right facial swelling  Neck:  no carotid bruits  Chest:  Clear to auscultation percussion, good respiratory effort, no use of accessory respiratory muscles  CVS:  Regular rate and rhythm without rub   Abdomen:  soft and nontender   Extremities: no significant lower extremity edema  Neuro:  No gross focality  Psych:  Alert , cooperative   Dialysis access: Right IJ PermCath      Medications:    Current Facility-Administered Medications:     acetaminophen (TYLENOL) tablet 975 mg, 975 mg, Oral, Q8H [facility], GRANT Rich, 975 mg at 03/18/24 0107    atorvastatin (LIPITOR) tablet 80 mg, 80 mg, Oral, Daily With Dinner, Angie Bernard MD, 80 mg at 03/17/24 1652    ceFAZolin (ANCEF) IVPB (premix in dextrose) 2,000 mg 50 mL, 2,000 mg, Intravenous, Q8H, Angie Bernard MD, Last Rate: 100 mL/hr at 03/18/24 0108, 2,000 mg at 03/18/24 0108    docusate sodium (COLACE) capsule 100 mg, 100 mg, Oral, BID, Angie Bernard MD, 100 mg at 03/17/24 1850    famotidine (PEPCID) tablet 10 mg, 10 mg, Oral, Daily, Angie Bernard MD, 10 mg at 03/17/24 0927    gabapentin (NEURONTIN) capsule 300 mg, 300 mg, Oral, TID, Angie Bernard MD, 300 mg at 03/17/24 2115    heparin (porcine) subcutaneous injection 5,000 Units, 5,000 Units, Subcutaneous, Q8H [facility], Angie Bernard MD, 5,000 Units at 03/18/24 0107    HYDROmorphone (DILAUDID) injection 1 mg, 1 mg, Intravenous, Q4H PRN, Angie Bernard MD, 1 mg at 03/18/24 0109    insulin glargine (LANTUS) subcutaneous injection 15 Units 0.15 mL, 15 Units, Subcutaneous, HS, Angie Bernard MD, 15 Units at 03/17/24 2116    insulin lispro (HumALOG/ADMELOG) 100 units/mL subcutaneous injection 1-6 Units, 1-6 Units, Subcutaneous, TID AC, 1 Units at 03/16/24 1626 **AND** Fingerstick Glucose (POCT), , , TID EMMA, Angie Bernard MD    insulin lispro (HumALOG/ADMELOG) 100 units/mL subcutaneous injection  1-6 Units, 1-6 Units, Subcutaneous, HS, Niharika Russell PA-C, 2 Units at 03/17/24 2116    insulin lispro (HumALOG/ADMELOG) 100 units/mL subcutaneous injection 3 Units, 3 Units, Subcutaneous, TID With Meals, Angie Bernard MD, 3 Units at 03/16/24 1626    metroNIDAZOLE (FLAGYL) tablet 500 mg, 500 mg, Oral, Q8H [facility], Angie Bernard MD, 500 mg at 03/18/24 0500    mupirocin (BACTROBAN) 2 % ointment, , Topical, TID, Angie Bernard MD, Given at 03/17/24 2116    NIFEdipine (PROCARDIA XL) 24 hr tablet 30 mg, 30 mg, Oral, 2 times per day on Sunday Monday Wednesday Friday, Kalli Mcbride PA-C, 30 mg at 03/17/24 1849    NIFEdipine (PROCARDIA XL) 24 hr tablet 30 mg, 30 mg, Oral, Once per day on Tuesday Thursday Saturday, Kalli Mcbride PA-C, 30 mg at 03/16/24 1725    ondansetron (ZOFRAN) injection 4 mg, 4 mg, Intravenous, Q6H PRN, Angie Bernard MD    polyethylene glycol (MIRALAX) packet 17 g, 17 g, Oral, Daily, Angie Bernard MD, 17 g at 03/12/24 1748    saccharomyces boulardii (FLORASTOR) capsule 250 mg, 250 mg, Oral, BID, Angie Bernard MD, 250 mg at 03/17/24 1849    sevelamer (RENAGEL) tablet 800 mg, 800 mg, Oral, TID With Meals, Angie Bernard MD, 800 mg at 03/17/24 1652    Laboratory Results:  Results from last 7 days   Lab Units 03/18/24  0437 03/17/24  0613 03/16/24  0541 03/15/24  0525 03/14/24  0550 03/13/24  0505 03/12/24  0939   WBC Thousand/uL 9.88 10.20* 9.94 9.91 11.49*  --  13.53*   HEMOGLOBIN g/dL 10.8* 11.1* 10.8* 11.0* 11.5*  --  10.8*   HEMATOCRIT % 34.1* 35.5* 34.3* 34.7* 35.4*  --  33.6*   PLATELETS Thousands/uL 276 270 251 223 237  --  162   SODIUM mmol/L 132*  --  133*  --  130* 132* 130*   POTASSIUM mmol/L 4.4  --  4.6  --  3.8 3.8 5.4*   CHLORIDE mmol/L 96  --  97  --  94* 94* 94*   CO2 mmol/L 25  --  23  --  22 27 25   BUN mg/dL 47*  --  58*  --  68* 52* 74*   CREATININE mg/dL 7.74*  --  8.20*  --  7.64* 6.09* 9.09*   CALCIUM mg/dL 7.8*  --  7.9*  --  8.4 8.5 8.5   MAGNESIUM mg/dL  --    "--   --   --   --   --  2.2       US head neck soft tissue   Final Result by Milan Posada MD (03/15 0821)      Cellulitis in the region of the right cheek without drainable organized fluid collection. However, there is a somewhat more focal area of subcutaneous fluid measuring approximately 2 cm, which could represent a developing collection.         Workstation performed: [id number]         CT facial bones w contrast   Final Result by Santosh Shah MD (03/12 1033)      1.  Extensive superficial and deep soft tissue cellulitis involving multiple spaces in the right greater than left face and neck extending inferiorly to the level of thyroid cartilage as described. The caudal portion of the inflammatory change is not    completely imaged. Visualized airways are patent. No abscess.   2.  Artifact from prior dental fillings prevents reliable evaluation for dental caries. There is no periapical disease.   3.  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         Workstation performed: [id number]         US head neck soft tissue    (Results Pending)       Portions of the record may have been created with voice recognition software. Occasional wrong word or \"sound a like\" substitutions may have occurred due to the inherent limitations of voice recognition software. Read the chart carefully and recognize, using context, where substitutions have occurred.    "

## 2024-03-18 NOTE — PLAN OF CARE
Problem: Potential for Falls  Goal: Patient will remain free of falls  Description: INTERVENTIONS:  - Educate patient/family on patient safety including physical limitations  - Instruct patient to call for assistance with activity   - Consult OT/PT to assist with strengthening/mobility   - Keep Call bell within reach  - Keep bed low and locked with side rails adjusted as appropriate  - Keep care items and personal belongings within reach  - Initiate and maintain comfort rounds  - Make Fall Risk Sign visible to staff  - Offer Toileting every 2 Hours, in advance of need  - Initiate/Maintain bed/chair alarm  - O  - Apply yellow socks and bracelet for high fall risk patients  - Consider moving patient to room near nurses station  Outcome: Progressing     Problem: METABOLIC, FLUID AND ELECTROLYTES - ADULT  Goal: Electrolytes maintained within normal limits  Description: INTERVENTIONS:  - Monitor labs and assess patient for signs and symptoms of electrolyte imbalances  - Administer electrolyte replacement as ordered  - Monitor response to electrolyte replacements, including repeat lab results as appropriate  - Instruct patient on fluid and nutrition as appropriate  Outcome: Progressing  Goal: Fluid balance maintained  Description: INTERVENTIONS:  - Monitor labs   - Monitor I/O and WT  - Instruct patient on fluid and nutrition as appropriate  - Assess for signs & symptoms of volume excess or deficit  Outcome: Progressing  Goal: Glucose maintained within target range  Description: INTERVENTIONS:  - Monitor Blood Glucose as ordered  - Assess for signs and symptoms of hyperglycemia and hypoglycemia  - Administer ordered medications to maintain glucose within target range  - Assess nutritional intake and initiate nutrition service referral as needed  Outcome: Progressing     Problem: PAIN - ADULT  Goal: Verbalizes/displays adequate comfort level or baseline comfort level  Description: Interventions:  - Encourage patient to  monitor pain and request assistance  - Assess pain using appropriate pain scale  - Administer analgesics based on type and severity of pain and evaluate response  - Implement non-pharmacological measures as appropriate and evaluate response  - Consider cultural and social influences on pain and pain management  - Notify physician/advanced practitioner if interventions unsuccessful or patient reports new pain  Outcome: Progressing     Problem: INFECTION - ADULT  Goal: Absence or prevention of progression during hospitalization  Description: INTERVENTIONS:  - Assess and monitor for signs and symptoms of infection  - Monitor lab/diagnostic results  - Monitor all insertion sites, i.e. indwelling lines, tubes, and drains  - Monitor endotracheal if appropriate and nasal secretions for changes in amount and color  - [place] appropriate cooling/warming therapies per order  - Administer medications as ordered  - Instruct and encourage patient and family to use good hand hygiene technique  - Identify and instruct in appropriate isolation precautions for identified infection/condition  Outcome: Progressing     Problem: DISCHARGE PLANNING  Goal: Discharge to home or other facility with appropriate resources  Description: INTERVENTIONS:  - Identify barriers to discharge w/patient and caregiver  - Arrange for needed discharge resources and transportation as appropriate  - Identify discharge learning needs (meds, wound care, etc.)  - Arrange for interpretive services to assist at discharge as needed  - Refer to Case Management Department for coordinating discharge planning if the patient needs post-hospital services based on physician/advanced practitioner order or complex needs related to functional status, cognitive ability, or social support system  Outcome: Progressing

## 2024-03-18 NOTE — ASSESSMENT & PLAN NOTE
Historical diagnosis  Per podiatry patient has preulcerative calluses on bilateral feet which were reduced  Podiatry recommended custom molded foot and ankle orthotic and inlays which patient stated he would consider getting his outpatient

## 2024-03-18 NOTE — PROGRESS NOTES
[facility]  Progress Note  Name: Naresh Carpio I  MRN: [id number]  Unit/Bed#: 3 [address] I Date of Admission: 3/12/2024   Date of Service: 3/18/2024 I Hospital Day: 6    Assessment/Plan   * Facial cellulitis  Assessment & Plan  Patient presents with acute onset right facial pain, with associated redness, swelling    CT face:Extensive superficial and deep soft tissue cellulitis involving multiple spaces in the right greater than left face and neck extending inferiorly to the level of thyroid cartilage as described. The caudal portion of the inflammatory change is not completely imaged. Visualized airways are patent. No abscess. 2.  Artifact from prior dental fillings prevents reliable evaluation for dental caries. There is no periapical disease. 3.  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  Hx: Pseudomonas/MRSA-negative  ED: Vancomycin/Rocephin  ABX: Vancomycin/cefepime/Metronizadole-stopped . (3/17) cefazolin initiated  Wcx: follow cx nondrainable, adjust antibiotics as needed  U/S face:Cellulitis in the region of the right cheek without drainable organized fluid collection. However, there is a somewhat more focal area of subcutaneous fluid measuring approximately 2 cm, which could represent a developing collection   (3/18)repeat U/S: Redemonstrated findings of cellulitis of the right cheek with underlying soft tissue edema and without focal drainable fluid collection identified. Enlarging, somewhat focal area of edema measuring up to 4 cm, previously 2 cm, possibly developing collection.  Tylenol scheduled with Dilaudid scheduled and as needed for breakthrough, home gabapentin  D/W ENT recs:   Add extended oral antibiotic coverage Flagyl  No need for repeat CT  Warm compress as tolerated/ No surgical plans at this time  Apply bactroban ointment to right cheek lesion BID  If the pain gets worse or swelling increases, can consider  ultrasound to the right cheek/neck to eval for fluid collection; at this time, I feel that the condition continues to remain as cellulitis more consolidated to the right face now and improving.  Curbside ID recs d/w :   continue current antibotics, long course for resolution  Consider Cefazolin w/ HD : 2mg/2mg/3mg for one additional week            Charcot's joint  Assessment & Plan  Historical diagnosis  Per patient's request podiatry consulted for evaluation    Electrolyte abnormality  Assessment & Plan  NA-130 >132  K-5.4 >wnl  Cl-94>wnl  We will replenish and monitor  D/W with nephro recs: HD planned    Type 2 diabetes mellitus, without long-term current use of insulin ([facility])  Assessment & Plan  Lab Results   Component Value Date    HGBA1C 5.4 03/12/2024     (P) [id number]  Repeat A1c-5.4%  Continue home Lantus 15 units subcu  SSI in place  Will adjust basal and prandial  Carb controlled diet    ESRD (end stage renal disease) ([facility])  Assessment & Plan  Lab Results   Component Value Date    EGFR 6 03/18/2024    EGFR 6 03/16/2024    EGFR 6 03/14/2024    CREATININE 7.74 (H) 03/18/2024    CREATININE 8.20 (H) 03/16/2024    CREATININE 7.64 (H) 03/14/2024     Continue home sevelamer  Avoid nephrotoxic's  Nephro following for HD management    Medical history non-contributory  Assessment & Plan  Chronic conditions, controlled with home medication    GERD-continue home famotidine 10 mg  HTN-continue home nifedipine 30 mg twice daily  HLD-continue home statin 80 mg  Gout-reevaluate home allopurinol  Neuropathy- gabapentin         VTE Pharmacologic Prophylaxis:   Moderate Risk (Score 3-4) - Pharmacological DVT Prophylaxis Ordered: heparin.    Mobility:   Basic Mobility Inpatient Raw Score: 23  JH-HLM Goal: 7: Walk 25 feet or more  JH-HLM Achieved: 7: Walk 25 feet or more  HLM Goal NOT achieved. Continue with multidisciplinary rounding and encourage appropriate mobility to improve upon HLM goals.    Patient Centered  Rounds: I performed bedside rounds with nursing staff today.   Discussions with Specialists or Other Care Team Provider: ENT, nephrology    Education and Discussions with Family / Patient: Patient declined call to .     Total Time Spent on Date of Encounter in care of patient: 40 mins. This time was spent on one or more of the following: performing physical exam; counseling and coordination of care; obtaining or reviewing history; documenting in the medical record; reviewing/ordering tests, medications or procedures; communicating with other healthcare professionals and discussing with patient's family/caregivers.    Current Length of Stay: 6 day(s)  Current Patient Status: Inpatient   Certification Statement: The patient will continue to require additional inpatient hospital stay due to IV antibiotics and specialist recs  Discharge Plan: Anticipate discharge tomorrow to home.    Code Status: Level 1 - Full Code    Subjective:   Patient seen and examined with nurse present, reported improvement in symptoms and pain, reported 8/10 still has shooting pain, just completed repeat ultrasound.  Patient reported that he would like podiatry to see him due to history of Charcot joint    Objective:     Vitals:   Temp (24hrs), Av.6 °F (36.4 °C), Min:97.4 °F (36.3 °C), Max:97.8 °F (36.6 °C)    Temp:  [97.4 °F (36.3 °C)-97.8 °F (36.6 °C)] 97.4 °F (36.3 °C)  HR:  [66-70] 66  Resp:  [16-19] 19  BP: (128-180)/(60-79) 180/79  SpO2:  [95 %-99 %] 98 %  There is no height or weight on file to calculate BMI.     Input and Output Summary (last 24 hours):     Intake/Output Summary (Last 24 hours) at 3/18/2024 1706  Last data filed at 3/18/2024 1602  Gross per 24 hour   Intake 600 ml   Output 200 ml   Net 400 ml           Physical Exam  Vitals and nursing note reviewed.   Constitutional:       General: He is not in acute distress.     Appearance: He is well-developed.   HENT:      Head: Normocephalic and atraumatic.    Eyes:      Conjunctiva/sclera: Conjunctivae normal.   Cardiovascular:      Rate and Rhythm: Normal rate and regular rhythm.      Heart sounds: No murmur heard.  Pulmonary:      Effort: Pulmonary effort is normal. No respiratory distress.      Breath sounds: Normal breath sounds.   Abdominal:      Palpations: Abdomen is soft.      Tenderness: There is no abdominal tenderness.   Musculoskeletal:         General: No swelling.      Cervical back: Neck supple.   Feet:      Left foot:      Skin integrity: Callus present.   Skin:     General: Skin is warm and dry.      Capillary Refill: Capillary refill takes less than 2 seconds.   Neurological:      Mental Status: He is alert.   Psychiatric:         Mood and Affect: Mood normal.          Additional Data:     Labs:  Results from last 7 days   Lab Units 03/18/24  0437   WBC Thousand/uL 9.88   HEMOGLOBIN g/dL 10.8*   HEMATOCRIT % 34.1*   PLATELETS Thousands/uL 276   NEUTROS PCT % 67   LYMPHS PCT % 13*   MONOS PCT % 10   EOS PCT % 7*     Results from last 7 days   Lab Units 03/18/24  0437 03/13/24  0505 03/12/24  0939   SODIUM mmol/L 132*   < > 130*   POTASSIUM mmol/L 4.4   < > 5.4*   CHLORIDE mmol/L 96   < > 94*   CO2 mmol/L 25   < > 25   BUN mg/dL 47*   < > 74*   CREATININE mg/dL 7.74*   < > 9.09*   ANION GAP mmol/L 11   < > 11   CALCIUM mg/dL 7.8*   < > 8.5   ALBUMIN g/dL  --   --  3.6   TOTAL BILIRUBIN mg/dL  --   --  0.42   ALK PHOS U/L  --   --  94   ALT U/L  --   --  25   AST U/L  --   --  44*   GLUCOSE RANDOM mg/dL 96   < > 110    < > = values in this interval not displayed.         Results from last 7 days   Lab Units 03/18/24  1601 03/18/24  1059 03/18/24  0715 03/17/24  2110 03/17/24  1559 03/17/24  1153 03/17/24  0835 03/16/24  1606 03/16/24  1121 03/16/24  0714 03/15/24  2023 03/15/24  1609   POC GLUCOSE mg/dl 104 98 91 206* 130 86 81 166* 110 94 146* 91     Results from last 7 days   Lab Units 03/12/24  0939   HEMOGLOBIN A1C % 5.4     Results from last 7 days    Lab Units 03/12/24  0939   LACTIC ACID mmol/L 0.7       Lines/Drains:  Invasive Devices       Peripheral Intravenous Line  Duration             Peripheral IV 03/14/24 Left;Ventral (anterior) Forearm 3 days              Hemodialysis Catheter  Duration             HD Permanent Double Catheter 201 days                    Imaging: Reviewed radiology reports from this admission including: Facial CT    Recent Cultures (last 7 days):   Results from last 7 days   Lab Units 03/12/24  1009   BLOOD CULTURE  No Growth After 5 Days.  No Growth After 5 Days.       Last 24 Hours Medication List:   Current Facility-Administered Medications   Medication Dose Route Frequency Provider Last Rate    acetaminophen  975 mg Oral Q8H [facility] GRANT Rich      atorvastatin  80 mg Oral Daily With Dinner Angie Bernard MD      cefazolin  2,000 mg Intravenous Q8H Angie Bernard MD 2,000 mg (03/18/24 1053)    docusate sodium  100 mg Oral BID Angie Bernard MD      famotidine  10 mg Oral Daily Angie Bernard MD      gabapentin  300 mg Oral TID Angie Bernard MD      heparin (porcine)  5,000 Units Subcutaneous Q8H [facility] Angie Bernard MD      insulin glargine  15 Units Subcutaneous HS Angie Bernard MD      insulin lispro  1-6 Units Subcutaneous TID AC Angie Bernard MD      insulin lispro  1-6 Units Subcutaneous HS Niharika Russell PA-C      insulin lispro  3 Units Subcutaneous TID With Meals Angie Bernard MD      metroNIDAZOLE  500 mg Oral Q8H [facility] Angie Bernard MD      mupirocin   Topical TID Angie Bernard MD      NIFEdipine  30 mg Oral 2 times per day on Sunday Monday Wednesday Friday Kalli Mcbride PA-C      NIFEdipine  30 mg Oral Once per day on Tuesday Thursday Saturday Kalli Mcbride PA-C      ondansetron  4 mg Intravenous Q6H PRN Angie Bernard MD      polyethylene glycol  17 g Oral Daily Angie Bernard MD      saccharomyces boulardii  250 mg Oral BID Angie Bernard MD      sevelamer  800 mg Oral TID With Meals  Angie Bernard MD          Today, Patient Was Seen By: Angie Bernard MD    **Please Note: This note may have been constructed using a voice recognition system.**

## 2024-03-19 ENCOUNTER — APPOINTMENT (INPATIENT)
Dept: DIALYSIS | Facility: HOSPITAL | Age: 64
DRG: 602 | End: 2024-03-19
Attending: INTERNAL MEDICINE
Payer: MEDICARE

## 2024-03-19 ENCOUNTER — APPOINTMENT (INPATIENT)
Dept: RADIOLOGY | Facility: HOSPITAL | Age: 64
DRG: 602 | End: 2024-03-19
Payer: MEDICARE

## 2024-03-19 PROBLEM — I10 ESSENTIAL HYPERTENSION: Status: ACTIVE | Noted: 2024-03-12

## 2024-03-19 PROBLEM — R47.9 DIFFICULTY WITH SPEECH: Status: ACTIVE | Noted: 2024-03-19

## 2024-03-19 LAB
BASOPHILS # BLD AUTO: 0.06 THOUSANDS/ÂΜL (ref 0–0.1)
BASOPHILS NFR BLD AUTO: 1 % (ref 0–1)
EOSINOPHIL # BLD AUTO: 0.69 THOUSAND/ÂΜL (ref 0–0.61)
EOSINOPHIL NFR BLD AUTO: 8 % (ref 0–6)
ERYTHROCYTE [DISTWIDTH] IN BLOOD BY AUTOMATED COUNT: 18.1 % (ref 11.6–15.1)
GLUCOSE SERPL-MCNC: 113 MG/DL (ref 65–140)
GLUCOSE SERPL-MCNC: 117 MG/DL (ref 65–140)
GLUCOSE SERPL-MCNC: 65 MG/DL (ref 65–140)
GLUCOSE SERPL-MCNC: 69 MG/DL (ref 65–140)
GLUCOSE SERPL-MCNC: 73 MG/DL (ref 65–140)
HCT VFR BLD AUTO: 33.1 % (ref 36.5–49.3)
HGB BLD-MCNC: 10.7 G/DL (ref 12–17)
IMM GRANULOCYTES # BLD AUTO: 0.17 THOUSAND/UL (ref 0–0.2)
IMM GRANULOCYTES NFR BLD AUTO: 2 % (ref 0–2)
LYMPHOCYTES # BLD AUTO: 1.03 THOUSANDS/ÂΜL (ref 0.6–4.47)
LYMPHOCYTES NFR BLD AUTO: 12 % (ref 14–44)
MCH RBC QN AUTO: 30.1 PG (ref 26.8–34.3)
MCHC RBC AUTO-ENTMCNC: 32.3 G/DL (ref 31.4–37.4)
MCV RBC AUTO: 93 FL (ref 82–98)
MONOCYTES # BLD AUTO: 1.09 THOUSAND/ÂΜL (ref 0.17–1.22)
MONOCYTES NFR BLD AUTO: 12 % (ref 4–12)
NEUTROPHILS # BLD AUTO: 5.82 THOUSANDS/ÂΜL (ref 1.85–7.62)
NEUTS SEG NFR BLD AUTO: 65 % (ref 43–75)
NRBC BLD AUTO-RTO: 0 /100 WBCS
PLATELET # BLD AUTO: 270 THOUSANDS/UL (ref 149–390)
PMV BLD AUTO: 9.7 FL (ref 8.9–12.7)
RBC # BLD AUTO: 3.55 MILLION/UL (ref 3.88–5.62)
WBC # BLD AUTO: 8.86 THOUSAND/UL (ref 4.31–10.16)

## 2024-03-19 PROCEDURE — 82948 REAGENT STRIP/BLOOD GLUCOSE: CPT

## 2024-03-19 PROCEDURE — 99233 SBSQ HOSP IP/OBS HIGH 50: CPT | Performed by: STUDENT IN AN ORGANIZED HEALTH CARE EDUCATION/TRAINING PROGRAM

## 2024-03-19 PROCEDURE — 70450 CT HEAD/BRAIN W/O DYE: CPT

## 2024-03-19 PROCEDURE — 99232 SBSQ HOSP IP/OBS MODERATE 35: CPT | Performed by: FAMILY MEDICINE

## 2024-03-19 PROCEDURE — 85025 COMPLETE CBC W/AUTO DIFF WBC: CPT | Performed by: STUDENT IN AN ORGANIZED HEALTH CARE EDUCATION/TRAINING PROGRAM

## 2024-03-19 RX ORDER — INSULIN GLARGINE 100 [IU]/ML
12 INJECTION, SOLUTION SUBCUTANEOUS
Status: DISCONTINUED | OUTPATIENT
Start: 2024-03-19 | End: 2024-03-20

## 2024-03-19 RX ADMIN — SEVELAMER HYDROCHLORIDE 800 MG: 800 TABLET, FILM COATED ORAL at 08:23

## 2024-03-19 RX ADMIN — HEPARIN SODIUM 5000 UNITS: 5000 INJECTION INTRAVENOUS; SUBCUTANEOUS at 15:31

## 2024-03-19 RX ADMIN — DOCUSATE SODIUM 100 MG: 100 CAPSULE, LIQUID FILLED ORAL at 17:28

## 2024-03-19 RX ADMIN — GABAPENTIN 300 MG: 300 CAPSULE ORAL at 08:23

## 2024-03-19 RX ADMIN — ACETAMINOPHEN 975 MG: 325 TABLET ORAL at 08:23

## 2024-03-19 RX ADMIN — NIFEDIPINE 30 MG: 30 TABLET, EXTENDED RELEASE ORAL at 20:15

## 2024-03-19 RX ADMIN — MUPIROCIN: 20 OINTMENT TOPICAL at 08:25

## 2024-03-19 RX ADMIN — METRONIDAZOLE 500 MG: 500 TABLET ORAL at 05:10

## 2024-03-19 RX ADMIN — GABAPENTIN 300 MG: 300 CAPSULE ORAL at 22:05

## 2024-03-19 RX ADMIN — CEFAZOLIN SODIUM 2000 MG: 2 SOLUTION INTRAVENOUS at 11:00

## 2024-03-19 RX ADMIN — CEFAZOLIN SODIUM 2000 MG: 2 SOLUTION INTRAVENOUS at 18:51

## 2024-03-19 RX ADMIN — SEVELAMER HYDROCHLORIDE 800 MG: 800 TABLET, FILM COATED ORAL at 12:00

## 2024-03-19 RX ADMIN — CEFAZOLIN SODIUM 2000 MG: 2 SOLUTION INTRAVENOUS at 01:13

## 2024-03-19 RX ADMIN — INSULIN GLARGINE 12 UNITS: 100 INJECTION, SOLUTION SUBCUTANEOUS at 22:05

## 2024-03-19 RX ADMIN — INSULIN LISPRO 3 UNITS: 100 INJECTION, SOLUTION INTRAVENOUS; SUBCUTANEOUS at 08:24

## 2024-03-19 RX ADMIN — ACETAMINOPHEN 975 MG: 325 TABLET ORAL at 01:13

## 2024-03-19 RX ADMIN — DOCUSATE SODIUM 100 MG: 100 CAPSULE, LIQUID FILLED ORAL at 08:24

## 2024-03-19 RX ADMIN — MUPIROCIN: 20 OINTMENT TOPICAL at 17:30

## 2024-03-19 RX ADMIN — POLYETHYLENE GLYCOL 3350 17 G: 17 POWDER, FOR SOLUTION ORAL at 08:24

## 2024-03-19 RX ADMIN — Medication 250 MG: at 08:23

## 2024-03-19 RX ADMIN — FAMOTIDINE 10 MG: 20 TABLET, FILM COATED ORAL at 08:23

## 2024-03-19 RX ADMIN — INSULIN LISPRO 3 UNITS: 100 INJECTION, SOLUTION INTRAVENOUS; SUBCUTANEOUS at 17:37

## 2024-03-19 RX ADMIN — ACETAMINOPHEN 975 MG: 325 TABLET ORAL at 17:27

## 2024-03-19 RX ADMIN — HEPARIN SODIUM 5000 UNITS: 5000 INJECTION INTRAVENOUS; SUBCUTANEOUS at 17:31

## 2024-03-19 RX ADMIN — Medication 250 MG: at 17:27

## 2024-03-19 RX ADMIN — HEPARIN SODIUM 5000 UNITS: 5000 INJECTION INTRAVENOUS; SUBCUTANEOUS at 01:13

## 2024-03-19 RX ADMIN — SEVELAMER HYDROCHLORIDE 800 MG: 800 TABLET, FILM COATED ORAL at 17:27

## 2024-03-19 RX ADMIN — GABAPENTIN 300 MG: 300 CAPSULE ORAL at 17:27

## 2024-03-19 RX ADMIN — ATORVASTATIN CALCIUM 80 MG: 40 TABLET, FILM COATED ORAL at 17:28

## 2024-03-19 RX ADMIN — MUPIROCIN: 20 OINTMENT TOPICAL at 20:15

## 2024-03-19 NOTE — PLAN OF CARE
Target UF Goal  2.5  L as tolerated. Patient dialyzing for 4 hours on 3 K bath for serum K of  4.4  per protocol. Treatment plan reviewed with Dr. Reyes.   Problem: METABOLIC, FLUID AND ELECTROLYTES - ADULT  Goal: Electrolytes maintained within normal limits  Description: INTERVENTIONS:  - Monitor labs and assess patient for signs and symptoms of electrolyte imbalances  - Administer electrolyte replacement as ordered  - Monitor response to electrolyte replacements, including repeat lab results as appropriate  - Instruct patient on fluid and nutrition as appropriate  Outcome: Progressing  Goal: Fluid balance maintained  Description: INTERVENTIONS:  - Monitor labs   - Monitor I/O and WT  - Instruct patient on fluid and nutrition as appropriate  - Assess for signs & symptoms of volume excess or deficit  Outcome: Progressing   Post-Dialysis RN Treatment Note    Blood Pressure:  Pre 117/58 mm/Hg  Post 155/69 mmHg   EDW  108 kg    Weight:  Pre 107.5 kg   Post 105.5 kg   Mode of weight measurement: Bed Scale   Volume Removed  2000 ml    Treatment duration 240 minutes    NS given  No    Treatment shortened? No   Medications given during Rx None Reported   Estimated Kt/V  None Reported   Access type: Permacath/TDC   Access Issues: No    Report called to primary nurse   Yes Monisha Bowers RN

## 2024-03-19 NOTE — PROGRESS NOTES
NEPHROLOGY PROGRESS NOTE   Naresh Carpio 63 y.o. male MRN: [id number]  Unit/Bed#: [address] [place] [address] Encounter: [id number]  Reason for Consult: Management of ESRD    ASSESSMENT AND PLAN:  62 yo man with PMH day of ESRD on HD TTS p/w facial cellulitis.  Nephrology is consulted for management of ESRD     PLAN:     #ESRD on HD TTS:  Dialysis unit/days: [facility]  Access: Right IJ PermCath  For HD today as per schedule  Renal Diet  Fluid restriction 1-1.5L/d  Adjust medications to GFR<10  Avoid opioids      #Volume status/hypertension:  Volume: Hypervolemic  Blood pressure: Hypertensive, /76, goal less than 140/90   Low-sodium diet  UF on HD   kg  Nifedipine 30 mg twice daily, avoid BP meds before dialysis to avoid intradialytic hypotension     #Secondary Hyperparasitoidism   Continue sevelamer with meals     # Anemia of Kidney Disease  Current hemoglobin: 10.7 mg/dL  Treatment:  On Mircera as an outpatient  Transfuse for hemoglobin less than 7.0 per primary service       # Facial cellulitis  Right facial edema  Antibiotics as per primary team  Patient will require cefazolin on dialysis 2 mg/2 mg / 3 mg for 1 week    #speech difficulty   Patient report speech difficulty this am   Speech with no changed in the last 24h as per my personal evaluation   I will inform primary team     The highlighted and/or bolded points in my assessment, plan, and disposition were discussed with the primary team and they agree with those points and the plan.  Previous records were personally reviewed by me to obtain a baseline creatinine.   The images (CXR) were personally reviewed by me in PACS      SUBJECTIVE:  Patient seen and examined at bedside. No chest pain, shortness of breath. Patient report speech changes this morning     OBJECTIVE:  Current Weight:    Vitals:    03/19/24 0700   BP: 137/63   Pulse: 66   Resp: 18   Temp: 98 °F (36.7 °C)   SpO2: 98%       Intake/Output Summary (Last 24 hours) at 3/19/2024 0812  Last data  filed at 3/19/2024 0400  Gross per 24 hour   Intake 300 ml   Output 450 ml   Net -150 ml     Wt Readings from Last 3 Encounters:   01/24/24 109 kg (240 lb)   09/13/23 107 kg (235 lb)   08/24/23 115 kg (253 lb)     Temp Readings from Last 3 Encounters:   03/19/24 98 °F (36.7 °C) (Oral)   01/24/24 98 °F (36.7 °C) (Oral)   09/06/23 97.7 °F (36.5 °C) (Oral)     BP Readings from Last 3 Encounters:   03/19/24 137/63   01/24/24 (!) 194/84   09/13/23 136/76     Pulse Readings from Last 3 Encounters:   03/19/24 66   01/24/24 82   09/13/23 64      General:  no acute distress at this time  Skin:  No acute rash  Eyes:  No scleral icterus and noninjected  ENT: Right sidea facial edema   Neck:  no carotid bruits  Chest:  Clear to auscultation percussion, good respiratory effort, no use of accessory respiratory muscles  CVS:  Regular rate and rhythm without rub   Abdomen:  soft and nontender   Extremities: no significant lower extremity edema  Neuro:  No gross focality  Psych:  Alert , cooperative   Dialysis access right IJ PermCath      Medications:    Current Facility-Administered Medications:     acetaminophen (TYLENOL) tablet 975 mg, 975 mg, Oral, Q8H [facility], GRANT Rich, 975 mg at 03/19/24 0113    atorvastatin (LIPITOR) tablet 80 mg, 80 mg, Oral, Daily With Dinner, Angie Bernard MD, 80 mg at 03/18/24 1642    ceFAZolin (ANCEF) IVPB (premix in dextrose) 2,000 mg 50 mL, 2,000 mg, Intravenous, Q8H, Angie Bernard MD, Last Rate: 100 mL/hr at 03/19/24 0113, 2,000 mg at 03/19/24 0113    docusate sodium (COLACE) capsule 100 mg, 100 mg, Oral, BID, Angie Bernard MD, 100 mg at 03/17/24 1850    famotidine (PEPCID) tablet 10 mg, 10 mg, Oral, Daily, Angie Bernard MD, 10 mg at 03/18/24 1056    gabapentin (NEURONTIN) capsule 300 mg, 300 mg, Oral, TID, Angie Bernard MD, 300 mg at 03/18/24 2105    heparin (porcine) subcutaneous injection 5,000 Units, 5,000 Units, Subcutaneous, Q8H [facility], Angie Bernard MD, 5,000 Units at 03/19/24  0113    insulin glargine (LANTUS) subcutaneous injection 15 Units 0.15 mL, 15 Units, Subcutaneous, HS, Angie Bernard MD, 15 Units at 03/18/24 2105    insulin lispro (HumALOG/ADMELOG) 100 units/mL subcutaneous injection 1-6 Units, 1-6 Units, Subcutaneous, TID AC, 1 Units at 03/16/24 1626 **AND** Fingerstick Glucose (POCT), , , TID AC, Angie Bernard MD    insulin lispro (HumALOG/ADMELOG) 100 units/mL subcutaneous injection 1-6 Units, 1-6 Units, Subcutaneous, HS, Niharika Russell PA-C, 2 Units at 03/17/24 2116    insulin lispro (HumALOG/ADMELOG) 100 units/mL subcutaneous injection 3 Units, 3 Units, Subcutaneous, TID With Meals, Angie Bernard MD, 3 Units at 03/18/24 1648    metroNIDAZOLE (FLAGYL) tablet 500 mg, 500 mg, Oral, Q8H KEMAL, Angie Bernard MD, 500 mg at 03/19/24 0510    mupirocin (BACTROBAN) 2 % ointment, , Topical, TID, Angie Bernard MD, Given at 03/18/24 2105    NIFEdipine (PROCARDIA XL) 24 hr tablet 30 mg, 30 mg, Oral, 2 times per day on Sunday Monday Wednesday Friday, Kalli Mcbride PA-C, 30 mg at 03/18/24 1741    NIFEdipine (PROCARDIA XL) 24 hr tablet 30 mg, 30 mg, Oral, Once per day on Tuesday Thursday Saturday, Kalli Mcbride PA-C, 30 mg at 03/16/24 1725    ondansetron (ZOFRAN) injection 4 mg, 4 mg, Intravenous, Q6H PRN, Angie Bernard MD    polyethylene glycol (MIRALAX) packet 17 g, 17 g, Oral, Daily, Angie Bernard MD, 17 g at 03/12/24 1748    saccharomyces boulardii (FLORASTOR) capsule 250 mg, 250 mg, Oral, BID, Anige Bernard MD, 250 mg at 03/18/24 1741    sevelamer (RENAGEL) tablet 800 mg, 800 mg, Oral, TID With Meals, Angei Bernard MD, 800 mg at 03/18/24 1641    Laboratory Results:  Results from last 7 days   Lab Units 03/19/24  0509 03/18/24  0437 03/17/24  0613 03/16/24  0541 03/15/24  0525 03/14/24  0550 03/13/24  0505 03/12/24  0939   WBC Thousand/uL 8.86 9.88 10.20* 9.94 9.91 11.49*  --  13.53*   HEMOGLOBIN g/dL 10.7* 10.8* 11.1* 10.8* 11.0* 11.5*  --  10.8*   HEMATOCRIT  % 33.1* 34.1* 35.5* 34.3* 34.7* 35.4*  --  33.6*   PLATELETS Thousands/uL 270 276 270 251 223 237  --  162   SODIUM mmol/L  --  132*  --  133*  --  130* 132* 130*   POTASSIUM mmol/L  --  4.4  --  4.6  --  3.8 3.8 5.4*   CHLORIDE mmol/L  --  96  --  97  --  94* 94* 94*   CO2 mmol/L  --  25  --  23  --  22 27 25   BUN mg/dL  --  47*  --  58*  --  68* 52* 74*   CREATININE mg/dL  --  7.74*  --  8.20*  --  7.64* 6.09* 9.09*   CALCIUM mg/dL  --  7.8*  --  7.9*  --  8.4 8.5 8.5   MAGNESIUM mg/dL  --   --   --   --   --   --   --  2.2       US head neck soft tissue   Final Result by Myra Chowdary MD (03/18 1335)      Redemonstrated findings of cellulitis of the right cheek with underlying soft tissue edema and without focal drainable fluid collection identified. Enlarging, somewhat focal area of edema measuring up to 4 cm, previously 2 cm, possibly developing    collection.      The study was marked in EPIC for significant notification.         Workstation performed: [id number]         US head neck soft tissue   Final Result by Milan Posada MD (03/15 0821)      Cellulitis in the region of the right cheek without drainable organized fluid collection. However, there is a somewhat more focal area of subcutaneous fluid measuring approximately 2 cm, which could represent a developing collection.         Workstation performed: [id number]         CT facial bones w contrast   Final Result by Santosh Shah MD (03/12 1033)      1.  Extensive superficial and deep soft tissue cellulitis involving multiple spaces in the right greater than left face and neck extending inferiorly to the level of thyroid cartilage as described. The caudal portion of the inflammatory change is not    completely imaged. Visualized airways are patent. No abscess.   2.  Artifact from prior dental fillings prevents reliable evaluation for dental caries. There is no periapical disease.   3.  Elongated bilateral styloid processes/calcified stylohyoid  "ligaments suggesting Eagle syndrome.         Workstation performed: [id number]             Portions of the record may have been created with voice recognition software. Occasional wrong word or \"sound a like\" substitutions may have occurred due to the inherent limitations of voice recognition software. Read the chart carefully and recognize, using context, where substitutions have occurred.    "

## 2024-03-20 PROBLEM — E87.8 ELECTROLYTE ABNORMALITY: Status: RESOLVED | Noted: 2023-08-19 | Resolved: 2024-03-20

## 2024-03-20 LAB
ANION GAP SERPL CALCULATED.3IONS-SCNC: 9 MMOL/L (ref 4–13)
BUN SERPL-MCNC: 37 MG/DL (ref 5–25)
CALCIUM SERPL-MCNC: 8 MG/DL (ref 8.4–10.2)
CHLORIDE SERPL-SCNC: 100 MMOL/L (ref 96–108)
CO2 SERPL-SCNC: 26 MMOL/L (ref 21–32)
CREAT SERPL-MCNC: 6.78 MG/DL (ref 0.6–1.3)
ERYTHROCYTE [DISTWIDTH] IN BLOOD BY AUTOMATED COUNT: 18.5 % (ref 11.6–15.1)
GFR SERPL CREATININE-BSD FRML MDRD: 7 ML/MIN/1.73SQ M
GLUCOSE SERPL-MCNC: 107 MG/DL (ref 65–140)
GLUCOSE SERPL-MCNC: 116 MG/DL (ref 65–140)
GLUCOSE SERPL-MCNC: 141 MG/DL (ref 65–140)
GLUCOSE SERPL-MCNC: 62 MG/DL (ref 65–140)
GLUCOSE SERPL-MCNC: 68 MG/DL (ref 65–140)
GLUCOSE SERPL-MCNC: 86 MG/DL (ref 65–140)
HCT VFR BLD AUTO: 32.2 % (ref 36.5–49.3)
HGB BLD-MCNC: 10.3 G/DL (ref 12–17)
MCH RBC QN AUTO: 30.2 PG (ref 26.8–34.3)
MCHC RBC AUTO-ENTMCNC: 32 G/DL (ref 31.4–37.4)
MCV RBC AUTO: 94 FL (ref 82–98)
PLATELET # BLD AUTO: 259 THOUSANDS/UL (ref 149–390)
PMV BLD AUTO: 9.2 FL (ref 8.9–12.7)
POTASSIUM SERPL-SCNC: 4.4 MMOL/L (ref 3.5–5.3)
RBC # BLD AUTO: 3.41 MILLION/UL (ref 3.88–5.62)
SODIUM SERPL-SCNC: 135 MMOL/L (ref 135–147)
WBC # BLD AUTO: 7.76 THOUSAND/UL (ref 4.31–10.16)

## 2024-03-20 PROCEDURE — 99232 SBSQ HOSP IP/OBS MODERATE 35: CPT | Performed by: FAMILY MEDICINE

## 2024-03-20 PROCEDURE — 85027 COMPLETE CBC AUTOMATED: CPT | Performed by: FAMILY MEDICINE

## 2024-03-20 PROCEDURE — 99232 SBSQ HOSP IP/OBS MODERATE 35: CPT | Performed by: STUDENT IN AN ORGANIZED HEALTH CARE EDUCATION/TRAINING PROGRAM

## 2024-03-20 PROCEDURE — 80048 BASIC METABOLIC PNL TOTAL CA: CPT | Performed by: FAMILY MEDICINE

## 2024-03-20 PROCEDURE — 82948 REAGENT STRIP/BLOOD GLUCOSE: CPT

## 2024-03-20 RX ORDER — CEFAZOLIN SODIUM 2 G/50ML
2000 SOLUTION INTRAVENOUS EVERY 24 HOURS
Status: DISCONTINUED | OUTPATIENT
Start: 2024-03-21 | End: 2024-03-22 | Stop reason: HOSPADM

## 2024-03-20 RX ORDER — INSULIN GLARGINE 100 [IU]/ML
6 INJECTION, SOLUTION SUBCUTANEOUS
Status: DISCONTINUED | OUTPATIENT
Start: 2024-03-20 | End: 2024-03-21

## 2024-03-20 RX ORDER — INSULIN LISPRO 100 [IU]/ML
1-6 INJECTION, SOLUTION INTRAVENOUS; SUBCUTANEOUS
Status: DISCONTINUED | OUTPATIENT
Start: 2024-03-21 | End: 2024-03-21

## 2024-03-20 RX ADMIN — Medication 250 MG: at 08:22

## 2024-03-20 RX ADMIN — MUPIROCIN: 20 OINTMENT TOPICAL at 17:25

## 2024-03-20 RX ADMIN — HEPARIN SODIUM 5000 UNITS: 5000 INJECTION INTRAVENOUS; SUBCUTANEOUS at 17:08

## 2024-03-20 RX ADMIN — MUPIROCIN: 20 OINTMENT TOPICAL at 21:41

## 2024-03-20 RX ADMIN — GABAPENTIN 300 MG: 300 CAPSULE ORAL at 21:36

## 2024-03-20 RX ADMIN — Medication 250 MG: at 17:10

## 2024-03-20 RX ADMIN — SEVELAMER HYDROCHLORIDE 800 MG: 800 TABLET, FILM COATED ORAL at 12:09

## 2024-03-20 RX ADMIN — CEFAZOLIN SODIUM 2000 MG: 2 SOLUTION INTRAVENOUS at 01:44

## 2024-03-20 RX ADMIN — NIFEDIPINE 30 MG: 30 TABLET, EXTENDED RELEASE ORAL at 08:21

## 2024-03-20 RX ADMIN — ATORVASTATIN CALCIUM 80 MG: 40 TABLET, FILM COATED ORAL at 17:11

## 2024-03-20 RX ADMIN — SEVELAMER HYDROCHLORIDE 800 MG: 800 TABLET, FILM COATED ORAL at 17:10

## 2024-03-20 RX ADMIN — MUPIROCIN: 20 OINTMENT TOPICAL at 08:22

## 2024-03-20 RX ADMIN — ACETAMINOPHEN 975 MG: 325 TABLET ORAL at 17:11

## 2024-03-20 RX ADMIN — HEPARIN SODIUM 5000 UNITS: 5000 INJECTION INTRAVENOUS; SUBCUTANEOUS at 01:44

## 2024-03-20 RX ADMIN — GABAPENTIN 300 MG: 300 CAPSULE ORAL at 17:25

## 2024-03-20 RX ADMIN — NIFEDIPINE 30 MG: 30 TABLET, EXTENDED RELEASE ORAL at 17:11

## 2024-03-20 RX ADMIN — ACETAMINOPHEN 975 MG: 325 TABLET ORAL at 01:44

## 2024-03-20 RX ADMIN — ACETAMINOPHEN 975 MG: 325 TABLET ORAL at 08:20

## 2024-03-20 RX ADMIN — CEFAZOLIN SODIUM 2000 MG: 2 SOLUTION INTRAVENOUS at 10:19

## 2024-03-20 RX ADMIN — GABAPENTIN 300 MG: 300 CAPSULE ORAL at 08:22

## 2024-03-20 RX ADMIN — SEVELAMER HYDROCHLORIDE 800 MG: 800 TABLET, FILM COATED ORAL at 08:18

## 2024-03-20 RX ADMIN — INSULIN GLARGINE 6 UNITS: 100 INJECTION, SOLUTION SUBCUTANEOUS at 21:36

## 2024-03-20 RX ADMIN — HEPARIN SODIUM 5000 UNITS: 5000 INJECTION INTRAVENOUS; SUBCUTANEOUS at 10:18

## 2024-03-20 RX ADMIN — FAMOTIDINE 10 MG: 20 TABLET, FILM COATED ORAL at 08:18

## 2024-03-20 NOTE — PROGRESS NOTES
NEPHROLOGY PROGRESS NOTE   Naresh Carpio 63 y.o. male MRN: [id number]  Unit/Bed#: [address] [place] [address] Encounter: [id number]  Reason for Consult: Management of ESRD    ASSESSMENT AND PLAN:  64 yo man with PMH day of ESRD on HD TTS p/w facial cellulitis.  Nephrology is consulted for management of ESRD     PLAN:     #ESRD on HD TTS:  Dialysis unit/days: [facility]  Access: Right IJ PermCath  Patient had dialysis yesterday, was shaking during dialysis.  Tolerated well  UF 2 L  Renal Diet  Fluid restriction 1-1.5L/d  Adjust medications to GFR<10  Avoid opioids   Plan to continue as she has per schedule  Patient will continue dialysis at outpatient dialysis unit at discharge     #Volume status/hypertension:  Volume: Euvolemic  Blood pressure: Hypertensive, /75, goal less than 140/90   Low-sodium diet  UF on HD  New  kg  Nifedipine 30 mg twice daily, avoid BP meds before dialysis to avoid intradialytic hypotension     #Secondary Hyperparasitoidism   Continue sevelamer with meals  Low phosphorus diet     # Anemia of Kidney Disease  Current hemoglobin: 10.3 mg/dL  Treatment:  On Mircera as an outpatient  Transfuse for hemoglobin less than 7.0 per primary service       # Facial cellulitis  Right facial edema improving  Antibiotics as per primary team  Plan for cefazolin on dialysis 2 mg/2 mg / 3 mg for 1 week     #speech difficulty   Speech back to normal     SUBJECTIVE:  Patient seen and examined at bedside. No chest pain, shortness of breath    OBJECTIVE:  Current Weight: Weight - Scale: 105 kg (232 lb 9.4 oz)  Vitals:    03/20/24 0100   BP: 154/75   Pulse: 75   Resp:    Temp: (!) 97.4 °F (36.3 °C)   SpO2:        Intake/Output Summary (Last 24 hours) at 3/20/2024 0746  Last data filed at 3/20/2024 0214  Gross per 24 hour   Intake 1140 ml   Output 2654 ml   Net -1514 ml     Wt Readings from Last 3 Encounters:   03/20/24 105 kg (232 lb 9.4 oz)   01/24/24 109 kg (240 lb)   09/13/23 107 kg (235 lb)     Temp Readings from  Last 3 Encounters:   03/20/24 (!) 97.4 °F (36.3 °C) (Oral)   01/24/24 98 °F (36.7 °C) (Oral)   09/06/23 97.7 °F (36.5 °C) (Oral)     BP Readings from Last 3 Encounters:   03/20/24 154/75   01/24/24 (!) 194/84   09/13/23 136/76     Pulse Readings from Last 3 Encounters:   03/20/24 75   01/24/24 82   09/13/23 64        General:  no acute distress at this time  Skin:  No acute rash  Eyes:  No scleral icterus and noninjected  ENT:  mucous membranes moist  Neck:  no carotid bruits  Chest:  Clear to auscultation percussion, good respiratory effort, no use of accessory respiratory muscles  CVS:  Regular rate and rhythm without rub   Abdomen:  soft and nontender   Extremities: no significant lower extremity edema  Neuro:  No gross focality  Psych:  Alert , cooperative   Dialysis access: Right IJ PermCath      Medications:    Current Facility-Administered Medications:     acetaminophen (TYLENOL) tablet 975 mg, 975 mg, Oral, Q8H [facility], GRANT Rich, 975 mg at 03/20/24 0144    atorvastatin (LIPITOR) tablet 80 mg, 80 mg, Oral, Daily With Dinner, Angie Bernard MD, 80 mg at 03/19/24 1728    ceFAZolin (ANCEF) IVPB (premix in dextrose) 2,000 mg 50 mL, 2,000 mg, Intravenous, Q8H, Angie Bernard MD, Stopped at 03/20/24 0214    docusate sodium (COLACE) capsule 100 mg, 100 mg, Oral, BID, Angie Bernard MD, 100 mg at 03/19/24 1728    famotidine (PEPCID) tablet 10 mg, 10 mg, Oral, Daily, Angie Bernard MD, 10 mg at 03/19/24 0823    gabapentin (NEURONTIN) capsule 300 mg, 300 mg, Oral, TID, Angie Bernard MD, 300 mg at 03/19/24 2205    heparin (porcine) subcutaneous injection 5,000 Units, 5,000 Units, Subcutaneous, Q8H [facility], Angie Bernard MD, 5,000 Units at 03/20/24 0144    insulin glargine (LANTUS) subcutaneous injection 12 Units 0.12 mL, 12 Units, Subcutaneous, HS, Lore Nadeem, DO, 12 Units at 03/19/24 2205    insulin lispro (HumALOG/ADMELOG) 100 units/mL subcutaneous injection 1-6 Units, 1-6 Units, Subcutaneous, TID AC, 1  Units at 03/16/24 1626 **AND** Fingerstick Glucose (POCT), , , TID AC, Angie Bernard MD    insulin lispro (HumALOG/ADMELOG) 100 units/mL subcutaneous injection 1-6 Units, 1-6 Units, Subcutaneous, HS, Niharika Russell PA-C, 2 Units at 03/17/24 2116    mupirocin (BACTROBAN) 2 % ointment, , Topical, TID, Angie Bernard MD, Given at 03/19/24 2015    NIFEdipine (PROCARDIA XL) 24 hr tablet 30 mg, 30 mg, Oral, 2 times per day on Sunday Monday Wednesday Friday, Kalli Mcbride PA-C, 30 mg at 03/18/24 1741    NIFEdipine (PROCARDIA XL) 24 hr tablet 30 mg, 30 mg, Oral, Once per day on Tuesday Thursday Saturday, Kalli Mcbride PA-C, 30 mg at 03/19/24 2015    ondansetron (ZOFRAN) injection 4 mg, 4 mg, Intravenous, Q6H PRN, Angie Bernard MD    polyethylene glycol (MIRALAX) packet 17 g, 17 g, Oral, Daily, Angie Bernard MD, 17 g at 03/19/24 0824    saccharomyces boulardii (FLORASTOR) capsule 250 mg, 250 mg, Oral, BID, Angie Bernard MD, 250 mg at 03/19/24 1727    sevelamer (RENAGEL) tablet 800 mg, 800 mg, Oral, TID With Meals, Angie Bernard MD, 800 mg at 03/19/24 1727    Laboratory Results:  Results from last 7 days   Lab Units 03/20/24  0530 03/19/24  0509 03/18/24  0437 03/17/24  0613 03/16/24  0541 03/15/24  0525 03/14/24  0550   WBC Thousand/uL 7.76 8.86 9.88 10.20* 9.94 9.91 11.49*   HEMOGLOBIN g/dL 10.3* 10.7* 10.8* 11.1* 10.8* 11.0* 11.5*   HEMATOCRIT % 32.2* 33.1* 34.1* 35.5* 34.3* 34.7* 35.4*   PLATELETS Thousands/uL 259 270 276 270 251 223 237   SODIUM mmol/L 135  --  132*  --  133*  --  130*   POTASSIUM mmol/L 4.4  --  4.4  --  4.6  --  3.8   CHLORIDE mmol/L 100  --  96  --  97  --  94*   CO2 mmol/L 26  --  25  --  23  --  22   BUN mg/dL 37*  --  47*  --  58*  --  68*   CREATININE mg/dL 6.78*  --  7.74*  --  8.20*  --  7.64*   CALCIUM mg/dL 8.0*  --  7.8*  --  7.9*  --  8.4       CT head wo contrast   Final Result by Nolan Estrada MD (03/19 6845)      No acute intracranial abnormality.      Mild  chronic microangiopathic ischemic changes.Chronic lacunar infarcts versus dilated perivascular spaces within the bilateral sublentiform regions.      Right premaxillary and perimandibular cellulitis, partially imaged, with no no suspicious drainable collection, however, evaluation is limited due to lack of IV contrast.                              Workstation performed: [id number]         US head neck soft tissue   Final Result by Myra Chowdary MD (03/18 1335)      Redemonstrated findings of cellulitis of the right cheek with underlying soft tissue edema and without focal drainable fluid collection identified. Enlarging, somewhat focal area of edema measuring up to 4 cm, previously 2 cm, possibly developing    collection.      The study was marked in EPIC for significant notification.         Workstation performed: [id number]         US head neck soft tissue   Final Result by Milan Posada MD (03/15 0821)      Cellulitis in the region of the right cheek without drainable organized fluid collection. However, there is a somewhat more focal area of subcutaneous fluid measuring approximately 2 cm, which could represent a developing collection.         Workstation performed: [id number]         CT facial bones w contrast   Final Result by Santosh Shah MD (03/12 1033)      1.  Extensive superficial and deep soft tissue cellulitis involving multiple spaces in the right greater than left face and neck extending inferiorly to the level of thyroid cartilage as described. The caudal portion of the inflammatory change is not    completely imaged. Visualized airways are patent. No abscess.   2.  Artifact from prior dental fillings prevents reliable evaluation for dental caries. There is no periapical disease.   3.  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         Workstation performed: [id number]             Portions of the record may have been created with voice  "recognition software. Occasional wrong word or \"sound a like\" substitutions may have occurred due to the inherent limitations of voice recognition software. Read the chart carefully and recognize, using context, where substitutions have occurred.    "

## 2024-03-20 NOTE — PROGRESS NOTES
[facility]  Progress Note  Name: Naresh Carpio I  MRN: [id number]  Unit/Bed#: 3 [address] I Date of Admission: 3/12/2024   Date of Service: 3/19/2024 I Hospital Day: 7    Assessment/Plan   * Facial cellulitis  Assessment & Plan  Patient presented with acute onset right facial pain, with associated redness, swelling    CT face: Extensive superficial and deep soft tissue cellulitis involving multiple spaces in the right greater than left face and neck extending inferiorly to the level of thyroid cartilage as described. The caudal portion of the inflammatory change is not completely imaged. Visualized airways are patent. No abscess. 2.  Artifact from prior dental fillings prevents reliable evaluation for dental caries. There is no periapical disease. 3.  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  Hx: Pseudomonas/MRSA-negative  ED: Vancomycin/Rocephin  ABX: Vancomycin/cefepime/Metronizadole have been discontinued and currently on cefazolin   Wcx: no poly noted  Initial U/S face: Cellulitis in the region of the right cheek without drainable organized fluid collection. However, there is a somewhat more focal area of subcutaneous fluid measuring approximately 2 cm, which could represent a developing collection   (3/18)repeat U/S: Showed cellulitis of the right cheek with underlying soft tissue edema and without focal drainable fluid collection identified. Enlarging, somewhat focal area of edema measuring up to 4 cm, previously 2 cm, possibly developing collection.  Per ENT previously, Warm compress as tolerated/ No surgical plans at this time  Apply bactroban ointment to right cheek lesion BID  Discussed with ENT again today to check imaging on media/updated ultrasound to see if any drainage is required or to continue with antibiotic alone  Curbside ID recs d/w :   continue current antibotics, long course for resolution  Consider Cefazolin  w/ HD : 2mg/2mg/3mg for one additional week            Difficulty with speech  Assessment & Plan  Patient reported some difficulty with his speech and intermittent tremors of his hands and legs which started yesterday  Unclear if this could be related to Flagyl use  CT head negative for acute pathology  MRI ordered and discussed with patient however patient adamant that he does not want MRI done even if premedicated.  He understands that his symptoms could be due to a stroke and a CT scan can miss a stroke but he is not interested  Later in the day when patient was reevaluated, his speech was significantly better and patient reported improvement in the tremors as well.  Patient states his symptoms were likely due to lack of sleep, infection and his blood sugars being low here.  Patient states he typically does not have sugars this low at home    ESRD (end stage renal disease) ([facility])  Assessment & Plan  Lab Results   Component Value Date    EGFR 6 03/18/2024    EGFR 6 03/16/2024    EGFR 6 03/14/2024    CREATININE 7.74 (H) 03/18/2024    CREATININE 8.20 (H) 03/16/2024    CREATININE 7.64 (H) 03/14/2024     Continue home sevelamer  Gets HD on TTS  Continue HD per nephrology    Type 2 diabetes mellitus, without long-term current use of insulin ([facility])  Assessment & Plan  Lab Results   Component Value Date    HGBA1C 5.4 03/12/2024     Decreased to 12 units of Lantus based on blood sugars.  It appears that patient does not take mealtime insulin and Humalog discontinued  Continue SSI  Carb controlled diet    Essential hypertension  Assessment & Plan  Continue nifedipine    Charcot's joint  Assessment & Plan  Historical diagnosis  Per podiatry patient has preulcerative calluses on bilateral feet which were reduced  Podiatry recommended custom molded foot and ankle orthotic and inlays which patient stated he would consider getting his outpatient    Electrolyte abnormality  Assessment & Plan  NA-130 >132  K-5.4  >wnl  Cl-94>wnl  Repeat lab work in a.m.               VTE Pharmacologic Prophylaxis:    heparin    Mobility:   Basic Mobility Inpatient Raw Score: 24  JH-HLM Goal: 8: Walk 250 feet or more  JH-HLM Achieved: 8: Walk 250 feet ot more  JH-HLM Goal achieved. Continue to encourage appropriate mobility.    Patient Centered Rounds: I performed bedside rounds with nursing staff today.   Discussions with Specialists or Other Care Team Provider: yes - renal, ENT    Education and Discussions with Family / Patient: Patient declined call to .     Total Time Spent on Date of Encounter in care of patient: This time was spent on one or more of the following: performing physical exam; counseling and coordination of care; obtaining or reviewing history; documenting in the medical record; reviewing/ordering tests, medications or procedures; communicating with other healthcare professionals and discussing with patient's family/caregivers.    Current Length of Stay: 7 day(s)  Current Patient Status: Inpatient   Certification Statement: The patient will continue to require additional inpatient hospital stay due to facial cellulitis requiring IV antibiotic  Discharge Plan: Anticipate discharge tomorrow to home.    Code Status: Level 1 - Full Code    Subjective:     Patient states there is drainage from the right side of his face.  Reports some difficulty with his speech along with intermittent tremors which started yesterday    Objective:     Vitals:   Temp (24hrs), Av.9 °F (36.6 °C), Min:96.7 °F (35.9 °C), Max:98.5 °F (36.9 °C)    Temp:  [96.7 °F (35.9 °C)-98.5 °F (36.9 °C)] 97.9 °F (36.6 °C)  HR:  [54-80] 80  Resp:  [6-20] 20  BP: (103-181)/(48-76) 181/74  SpO2:  [94 %-99 %] 99 %  Body mass index is 36.49 kg/m².     Input and Output Summary (last 24 hours):     Intake/Output Summary (Last 24 hours) at 3/19/2024 2030  Last data filed at 3/19/2024 1255  Gross per 24 hour   Intake 1040 ml   Output 2904 ml   Net -1864 ml        Physical Exam:   Physical Exam  Vitals reviewed.   Constitutional:       General: He is not in acute distress.     Appearance: He is not toxic-appearing.   HENT:      Head: Normocephalic.      Comments: Right cheek -erythema, induration but no obvious fluctuance noted.  Very mild tenderness.  Some drainage noted  Eyes:      General:         Right eye: No discharge.         Left eye: No discharge.   Cardiovascular:      Rate and Rhythm: Normal rate and regular rhythm.   Pulmonary:      Effort: Pulmonary effort is normal. No respiratory distress.      Breath sounds: Normal breath sounds. No wheezing or rales.   Abdominal:      General: Bowel sounds are normal. There is no distension.      Palpations: Abdomen is soft.      Tenderness: There is no abdominal tenderness.   Musculoskeletal:      Right lower leg: No edema.      Left lower leg: No edema.   Neurological:      Mental Status: He is alert and oriented to person, place, and time.      Comments: Dysarthric          Additional Data:     Labs:  Results from last 7 days   Lab Units 03/19/24  0509   WBC Thousand/uL 8.86   HEMOGLOBIN g/dL 10.7*   HEMATOCRIT % 33.1*   PLATELETS Thousands/uL 270   NEUTROS PCT % 65   LYMPHS PCT % 12*   MONOS PCT % 12   EOS PCT % 8*     Results from last 7 days   Lab Units 03/18/24  0437   SODIUM mmol/L 132*   POTASSIUM mmol/L 4.4   CHLORIDE mmol/L 96   CO2 mmol/L 25   BUN mg/dL 47*   CREATININE mg/dL 7.74*   ANION GAP mmol/L 11   CALCIUM mg/dL 7.8*   GLUCOSE RANDOM mg/dL 96         Results from last 7 days   Lab Units 03/19/24  1734 03/19/24  1147 03/19/24  0819 03/19/24  0727 03/18/24  2037 03/18/24  1601 03/18/24  1059 03/18/24  0715 03/17/24  2110 03/17/24  1559 03/17/24  1153 03/17/24  0835   POC GLUCOSE mg/dl 113 65 73 69 98 104 98 91 206* 130 86 81               Lines/Drains:  Invasive Devices       Peripheral Intravenous Line  Duration             Peripheral IV 03/14/24 Left;Ventral (anterior) Forearm 4 days               Hemodialysis Catheter  Duration             HD Permanent Double Catheter 202 days                          Imaging: Reviewed radiology reports from this admission including: CT head    Recent Cultures (last 7 days):   Results from last 7 days   Lab Units 03/18/24 1927   GRAM STAIN RESULT  No Polys or Bacteria seen       Last 24 Hours Medication List:   Current Facility-Administered Medications   Medication Dose Route Frequency Provider Last Rate    acetaminophen  975 mg Oral Q8H [facility] [place] Rabago [id number]      atorvastatin  80 mg Oral Daily With Dinner Angie Bernard MD      cefazolin  2,000 mg Intravenous Q8H Angie Bernard MD 2,000 mg (03/19/24 1851)    docusate sodium  100 mg Oral BID Angie Bernard MD      famotidine  10 mg Oral Daily Angie Bernard MD      gabapentin  300 mg Oral TID Angie Bernard MD      heparin (porcine)  5,000 Units Subcutaneous Q8H [facility] Angie Bernard MD      insulin glargine  12 Units Subcutaneous HS Lore Silver DO      insulin lispro  1-6 Units Subcutaneous TID AC Angie Bernard MD      insulin lispro  1-6 Units Subcutaneous HS Niharika Russell PA-C      mupirocin   Topical TID Angie Bernard MD      NIFEdipine  30 mg Oral 2 times per day on Sunday Monday Wednesday Friday Kalli Mcbride PA-C      NIFEdipine  30 mg Oral Once per day on Tuesday Thursday Saturday Kalli Mcbride PA-C      ondansetron  4 mg Intravenous Q6H PRN Angie Bernard MD      polyethylene glycol  17 g Oral Daily Angie Bernard MD      saccharomyces boulardii  250 mg Oral BID Angie Bernard MD      sevelamer  800 mg Oral TID With Meals Angie Bernard MD          Today, Patient Was Seen By: Lore Silver DO    **Please Note: This note may have been constructed using a voice recognition system.**

## 2024-03-20 NOTE — PLAN OF CARE
Plan of care reviewed w/ patient.    Problem: Potential for Falls  Goal: Patient will remain free of falls  Description: INTERVENTIONS:  - Educate patient/family on patient safety including physical limitations  - Instruct patient to call for assistance with activity   - Consult OT/PT to assist with strengthening/mobility   - Keep Call bell within reach  - Keep bed low and locked with side rails adjusted as appropriate  - Keep care items and personal belongings within reach  - Initiate and maintain comfort rounds  - Make Fall Risk Sign visible to staff  - Offer Toileting every 2 Hours, in advance of need  - Initiate/Maintain bed/chair alarm  - O  - Apply yellow socks and bracelet for high fall risk patients  - Consider moving patient to room near nurses station  Outcome: Progressing     Problem: METABOLIC, FLUID AND ELECTROLYTES - ADULT  Goal: Electrolytes maintained within normal limits  Description: INTERVENTIONS:  - Monitor labs and assess patient for signs and symptoms of electrolyte imbalances  - Administer electrolyte replacement as ordered  - Monitor response to electrolyte replacements, including repeat lab results as appropriate  - Instruct patient on fluid and nutrition as appropriate  Outcome: Progressing  Goal: Fluid balance maintained  Description: INTERVENTIONS:  - Monitor labs   - Monitor I/O and WT  - Instruct patient on fluid and nutrition as appropriate  - Assess for signs & symptoms of volume excess or deficit  Outcome: Progressing  Goal: Glucose maintained within target range  Description: INTERVENTIONS:  - Monitor Blood Glucose as ordered  - Assess for signs and symptoms of hyperglycemia and hypoglycemia  - Administer ordered medications to maintain glucose within target range  - Assess nutritional intake and initiate nutrition service referral as needed  Outcome: Progressing     Problem: PAIN - ADULT  Goal: Verbalizes/displays adequate comfort level or baseline comfort level  Description:  Interventions:  - Encourage patient to monitor pain and request assistance  - Assess pain using appropriate pain scale  - Administer analgesics based on type and severity of pain and evaluate response  - Implement non-pharmacological measures as appropriate and evaluate response  - Consider cultural and social influences on pain and pain management  - Notify physician/advanced practitioner if interventions unsuccessful or patient reports new pain  Outcome: Progressing     Problem: INFECTION - ADULT  Goal: Absence or prevention of progression during hospitalization  Description: INTERVENTIONS:  - Assess and monitor for signs and symptoms of infection  - Monitor lab/diagnostic results  - Monitor all insertion sites, i.e. indwelling lines, tubes, and drains  - Monitor endotracheal if appropriate and nasal secretions for changes in amount and color  - [place] appropriate cooling/warming therapies per order  - Administer medications as ordered  - Instruct and encourage patient and family to use good hand hygiene technique  - Identify and instruct in appropriate isolation precautions for identified infection/condition  Outcome: Progressing     Problem: SAFETY ADULT  Goal: Patient will remain free of falls  Description: INTERVENTIONS:  - Educate patient/family on patient safety including physical limitations  - Instruct patient to call for assistance with activity   - Consult OT/PT to assist with strengthening/mobility   - Keep Call bell within reach  - Keep bed low and locked with side rails adjusted as appropriate  - Keep care items and personal belongings within reach  - Initiate and maintain comfort rounds  - Make Fall Risk Sign visible to staff  - Offer Toileting every 2 Hours, in advance of need  - Initiate/Maintain bed/chair alarm  - O  - Apply yellow socks and bracelet for high fall risk patients  - Consider moving patient to room near nurses station  Outcome: Progressing     Problem: DISCHARGE PLANNING  Goal:  Discharge to home or other facility with appropriate resources  Description: INTERVENTIONS:  - Identify barriers to discharge w/patient and caregiver  - Arrange for needed discharge resources and transportation as appropriate  - Identify discharge learning needs (meds, wound care, etc.)  - Arrange for interpretive services to assist at discharge as needed  - Refer to Case Management Department for coordinating discharge planning if the patient needs post-hospital services based on physician/advanced practitioner order or complex needs related to functional status, cognitive ability, or social support system  Outcome: Progressing     Problem: Knowledge Deficit  Goal: Patient/family/caregiver demonstrates understanding of disease process, treatment plan, medications, and discharge instructions  Description: Complete learning assessment and assess knowledge base.  Interventions:  - Provide teaching at level of understanding  - Provide teaching via preferred learning methods  Outcome: Progressing

## 2024-03-20 NOTE — CASE MANAGEMENT
Case Management Progress Note    Patient name Naresh Carpio  Location 3 [place] [address]-* MRN [id number]  : 1960 Date 3/20/2024       LOS (days): 8  Geometric Mean LOS (GMLOS) (days): 4.8  Days to GMLOS:-3.4        OBJECTIVE:    Current admission status: Inpatient  Preferred Pharmacy:   [id number] - [place], [place] - [address] [address]  [address] [address]  Phone: [phone] Fax: [phone]    Primary Care Provider: Cathy Schultz MD    Primary Insurance: MEDICARE  Secondary Insurance:     PROGRESS NOTE:      CM was made aware by attending that patient will need one dose of Cefazolin after dialysis on 3/23/24.     CM faxed prescription to [facility] in [place] (Fax # [phone]), successful fax confirmation was received, and both prescription and confirmation were placed in scan bin for patient's chart.     CM called [facility] in [facility] ([phone]) to make facility aware that patient will need antibiotic Cefazolin once on Saturday 3/23 after dialysis. [facility] representative verbalized understanding and made a note in patient's chart.

## 2024-03-20 NOTE — PLAN OF CARE
Problem: INFECTION - ADULT  Goal: Absence or prevention of progression during hospitalization  Description: INTERVENTIONS:  - Assess and monitor for signs and symptoms of infection  - Monitor lab/diagnostic results  - Monitor all insertion sites, i.e. indwelling lines, tubes, and drains  - Monitor endotracheal if appropriate and nasal secretions for changes in amount and color  - [place] appropriate cooling/warming therapies per order  - Administer medications as ordered  - Instruct and encourage patient and family to use good hand hygiene technique  - Identify and instruct in appropriate isolation precautions for identified infection/condition  Outcome: Progressing     Problem: Knowledge Deficit  Goal: Patient/family/caregiver demonstrates understanding of disease process, treatment plan, medications, and discharge instructions  Description: Complete learning assessment and assess knowledge base.  Interventions:  - Provide teaching at level of understanding  - Provide teaching via preferred learning methods  Outcome: Progressing

## 2024-03-20 NOTE — ASSESSMENT & PLAN NOTE
Patient reported some difficulty with his speech and intermittent tremors of his hands and legs which started yesterday  Unclear if this could be related to Flagyl use  CT head negative for acute pathology  MRI ordered and discussed with patient however patient adamant that he does not want MRI done even if premedicated.  He understands that his symptoms could be due to a stroke and a CT scan can miss a stroke but he is not interested  Later in the day when patient was reevaluated, his speech was significantly better and patient reported improvement in the tremors as well.  Patient states his symptoms were likely due to lack of sleep, infection and his blood sugars being low here.  Patient states he typically does not have sugars this low at home

## 2024-03-21 ENCOUNTER — APPOINTMENT (INPATIENT)
Dept: DIALYSIS | Facility: HOSPITAL | Age: 64
DRG: 602 | End: 2024-03-21
Attending: INTERNAL MEDICINE
Payer: MEDICARE

## 2024-03-21 ENCOUNTER — APPOINTMENT (INPATIENT)
Dept: RADIOLOGY | Facility: HOSPITAL | Age: 64
DRG: 602 | End: 2024-03-21
Payer: MEDICARE

## 2024-03-21 LAB
BACTERIA WND AEROBE CULT: ABNORMAL
GLUCOSE SERPL-MCNC: 116 MG/DL (ref 65–140)
GLUCOSE SERPL-MCNC: 127 MG/DL (ref 65–140)
GLUCOSE SERPL-MCNC: 77 MG/DL (ref 65–140)
GLUCOSE SERPL-MCNC: 92 MG/DL (ref 65–140)
GRAM STN SPEC: ABNORMAL

## 2024-03-21 PROCEDURE — 99232 SBSQ HOSP IP/OBS MODERATE 35: CPT | Performed by: STUDENT IN AN ORGANIZED HEALTH CARE EDUCATION/TRAINING PROGRAM

## 2024-03-21 PROCEDURE — 82948 REAGENT STRIP/BLOOD GLUCOSE: CPT

## 2024-03-21 PROCEDURE — 76536 US EXAM OF HEAD AND NECK: CPT

## 2024-03-21 PROCEDURE — 99223 1ST HOSP IP/OBS HIGH 75: CPT | Performed by: PHYSICIAN ASSISTANT

## 2024-03-21 PROCEDURE — 99232 SBSQ HOSP IP/OBS MODERATE 35: CPT | Performed by: FAMILY MEDICINE

## 2024-03-21 RX ORDER — INSULIN LISPRO 100 [IU]/ML
1-5 INJECTION, SOLUTION INTRAVENOUS; SUBCUTANEOUS
Status: DISCONTINUED | OUTPATIENT
Start: 2024-03-21 | End: 2024-03-22 | Stop reason: HOSPADM

## 2024-03-21 RX ORDER — LIDOCAINE HYDROCHLORIDE 10 MG/ML
10 INJECTION, SOLUTION EPIDURAL; INFILTRATION; INTRACAUDAL; PERINEURAL ONCE
Status: DISCONTINUED | OUTPATIENT
Start: 2024-03-21 | End: 2024-03-22 | Stop reason: HOSPADM

## 2024-03-21 RX ORDER — INSULIN GLARGINE 100 [IU]/ML
10 INJECTION, SOLUTION SUBCUTANEOUS
Status: DISCONTINUED | OUTPATIENT
Start: 2024-03-21 | End: 2024-03-22 | Stop reason: HOSPADM

## 2024-03-21 RX ADMIN — MUPIROCIN: 20 OINTMENT TOPICAL at 16:46

## 2024-03-21 RX ADMIN — HEPARIN SODIUM 5000 UNITS: 5000 INJECTION INTRAVENOUS; SUBCUTANEOUS at 17:10

## 2024-03-21 RX ADMIN — ACETAMINOPHEN 975 MG: 325 TABLET ORAL at 12:13

## 2024-03-21 RX ADMIN — INSULIN GLARGINE 10 UNITS: 100 INJECTION, SOLUTION SUBCUTANEOUS at 21:04

## 2024-03-21 RX ADMIN — HEPARIN SODIUM 5000 UNITS: 5000 INJECTION INTRAVENOUS; SUBCUTANEOUS at 02:13

## 2024-03-21 RX ADMIN — GABAPENTIN 300 MG: 300 CAPSULE ORAL at 16:47

## 2024-03-21 RX ADMIN — SEVELAMER HYDROCHLORIDE 800 MG: 800 TABLET, FILM COATED ORAL at 16:47

## 2024-03-21 RX ADMIN — SEVELAMER HYDROCHLORIDE 800 MG: 800 TABLET, FILM COATED ORAL at 12:13

## 2024-03-21 RX ADMIN — GABAPENTIN 300 MG: 300 CAPSULE ORAL at 12:13

## 2024-03-21 RX ADMIN — ATORVASTATIN CALCIUM 80 MG: 40 TABLET, FILM COATED ORAL at 16:47

## 2024-03-21 RX ADMIN — Medication 250 MG: at 17:10

## 2024-03-21 RX ADMIN — MUPIROCIN: 20 OINTMENT TOPICAL at 21:03

## 2024-03-21 RX ADMIN — CEFAZOLIN SODIUM 2000 MG: 2 SOLUTION INTRAVENOUS at 11:06

## 2024-03-21 RX ADMIN — GABAPENTIN 300 MG: 300 CAPSULE ORAL at 21:02

## 2024-03-21 RX ADMIN — FAMOTIDINE 10 MG: 20 TABLET, FILM COATED ORAL at 12:13

## 2024-03-21 RX ADMIN — NIFEDIPINE 30 MG: 30 TABLET, EXTENDED RELEASE ORAL at 21:02

## 2024-03-21 RX ADMIN — Medication 250 MG: at 12:13

## 2024-03-21 RX ADMIN — DOCUSATE SODIUM 100 MG: 100 CAPSULE, LIQUID FILLED ORAL at 17:10

## 2024-03-21 NOTE — PLAN OF CARE
Problem: Potential for Falls  Goal: Patient will remain free of falls  Description: INTERVENTIONS:  - Educate patient/family on patient safety including physical limitations  - Instruct patient to call for assistance with activity   - Consult OT/PT to assist with strengthening/mobility   - Keep Call bell within reach  - Keep bed low and locked with side rails adjusted as appropriate  - Keep care items and personal belongings within reach  - Initiate and maintain comfort rounds  - Make Fall Risk Sign visible to staff  - Offer Toileting every 2 Hours, in advance of need  - Initiate/Maintain bed/chair alarm  - O  - Apply yellow socks and bracelet for high fall risk patients  - Consider moving patient to room near nurses station  Outcome: Progressing     Problem: METABOLIC, FLUID AND ELECTROLYTES - ADULT  Goal: Electrolytes maintained within normal limits  Description: INTERVENTIONS:  - Monitor labs and assess patient for signs and symptoms of electrolyte imbalances  - Administer electrolyte replacement as ordered  - Monitor response to electrolyte replacements, including repeat lab results as appropriate  - Instruct patient on fluid and nutrition as appropriate  Outcome: Progressing     Problem: METABOLIC, FLUID AND ELECTROLYTES - ADULT  Goal: Fluid balance maintained  Description: INTERVENTIONS:  - Monitor labs   - Monitor I/O and WT  - Instruct patient on fluid and nutrition as appropriate  - Assess for signs & symptoms of volume excess or deficit  Outcome: Progressing     Problem: METABOLIC, FLUID AND ELECTROLYTES - ADULT  Goal: Glucose maintained within target range  Description: INTERVENTIONS:  - Monitor Blood Glucose as ordered  - Assess for signs and symptoms of hyperglycemia and hypoglycemia  - Administer ordered medications to maintain glucose within target range  - Assess nutritional intake and initiate nutrition service referral as needed  Outcome: Progressing     Problem: PAIN - ADULT  Goal:  Verbalizes/displays adequate comfort level or baseline comfort level  Description: Interventions:  - Encourage patient to monitor pain and request assistance  - Assess pain using appropriate pain scale  - Administer analgesics based on type and severity of pain and evaluate response  - Implement non-pharmacological measures as appropriate and evaluate response  - Consider cultural and social influences on pain and pain management  - Notify physician/advanced practitioner if interventions unsuccessful or patient reports new pain  Outcome: Progressing     Problem: INFECTION - ADULT  Goal: Absence or prevention of progression during hospitalization  Description: INTERVENTIONS:  - Assess and monitor for signs and symptoms of infection  - Monitor lab/diagnostic results  - Monitor all insertion sites, i.e. indwelling lines, tubes, and drains  - Monitor endotracheal if appropriate and nasal secretions for changes in amount and color  - [place] appropriate cooling/warming therapies per order  - Administer medications as ordered  - Instruct and encourage patient and family to use good hand hygiene technique  - Identify and instruct in appropriate isolation precautions for identified infection/condition  Outcome: Progressing     Problem: SAFETY ADULT  Goal: Patient will remain free of falls  Description: INTERVENTIONS:  - Educate patient/family on patient safety including physical limitations  - Instruct patient to call for assistance with activity   - Consult OT/PT to assist with strengthening/mobility   - Keep Call bell within reach  - Keep bed low and locked with side rails adjusted as appropriate  - Keep care items and personal belongings within reach  - Initiate and maintain comfort rounds  - Make Fall Risk Sign visible to staff  - Offer Toileting every 2 Hours, in advance of need  - Initiate/Maintain bed/chair alarm  - O  - Apply yellow socks and bracelet for high fall risk patients  - Consider moving patient to room  near nurses station  Outcome: Progressing     Problem: Knowledge Deficit  Goal: Patient/family/caregiver demonstrates understanding of disease process, treatment plan, medications, and discharge instructions  Description: Complete learning assessment and assess knowledge base.  Interventions:  - Provide teaching at level of understanding  - Provide teaching via preferred learning methods  Outcome: Progressing     Problem: DISCHARGE PLANNING  Goal: Discharge to home or other facility with appropriate resources  Description: INTERVENTIONS:  - Identify barriers to discharge w/patient and caregiver  - Arrange for needed discharge resources and transportation as appropriate  - Identify discharge learning needs (meds, wound care, etc.)  - Arrange for interpretive services to assist at discharge as needed  - Refer to Case Management Department for coordinating discharge planning if the patient needs post-hospital services based on physician/advanced practitioner order or complex needs related to functional status, cognitive ability, or social support system  Outcome: Progressing

## 2024-03-21 NOTE — ASSESSMENT & PLAN NOTE
Lab Results   Component Value Date    HGBA1C 5.4 03/12/2024     Increase to 10 units of Lantus.  Decrease to algorithm 1 SSI with meals and discontinue SSI at bedtime as blood sugars are very well-controlled   Carb controlled diet

## 2024-03-21 NOTE — CONSULTS
Consultation - General Surgery   Naresh Carpio 63 y.o. male MRN: [id number]  Unit/Bed#: [address] [place] [address] Encounter: [id number]    Assessment/Plan     Assessment:  1) type II diabetic insulin-dependent with nephropathy, end-stage renal disease, dialysis weekly presenting to the acute care surgery team with right-sided facial cellulitis of the right cheek - erythema and induration consolidating focal to the center of the right cheek, 2 other small pustules noted around the posterior aspect of the mandible, superficial cervical lymphadenopathy noted on palpation, small punctate area in the center of erythema and induration that is open now and draining small amounts of purulence, CT scan and ultrasound both were negative for consolidation of abscess but patient is draining purulence from the center of the wound, patient does have significant improvement compared to admission based on pictures and patient reports on IV antibiotics, leukocytosis is also normalized  Plan:  1)   -Will perform small stab incision at bedside over the where purulence is draining to see if we can facilitate quicker healing and more adequate drainage of already existing site  -Continue antibiotics per infectious disease and medicine team  -Reviewed cultures  -Continue dialysis  -Continue sliding scale insulin  -DVT prophylaxis  -Diet as tolerated    History of Present Illness   HPI:  Naresh Carpio is a 63 y.o. male with a past medical history pertinent for type 2 diabetes insulin-dependent, diabetic nephropathy, dialysis presenting to the acute care surgery team secondary to approximately 2 weeks of facial swelling on the right side.  Patient reports approximately 2 weeks ago he could have been shaving but he also started developing pustule/acne on his face and he could have been picking at 1 of these.  Either way he suspects that 1 of these 2 activities potentially introduce infection to his right cheek.  Patient reports that since then he  started having profound redness, swelling, pain overlying the right cheek.  Patient denies any fevers or chills but did come into the hospital.  Patient had quite dramatic swelling initially on admission but did not have any signs of abscess on CT scan.  Patient was placed on IV antibiotics and was managed medicinally.  Patient has noted over the course of approximately 9 days marked decrease in facial swelling and decreased tenderness and erythema.  Patient's swelling and erythema does not extend to around the eye/periorbital or cross MCC point of the face.  Patient's discomfort, redness, swelling has become more consolidated and confined to the right cheek.  Over the course of the last 3 days it has been noted that patient's cheek although the swelling has decreased the center of it has started to breakdown the skin and started to drain purulence.  Patient feels like there is more that needs to be drained but is if it is limited by the home skin.  No overt fluctuance is noted.  CT scan previously and repeat ultrasounds did not show a large abscess cavity but patient is very obviously on exam draining purulence from the center portion of the swelling.  Patient denies any fevers or chills at this moment time.  Patient denies any other symptoms other than a speech impediment due to a tic.  Patient reports that since medications that he is on were started on admission he has had a tic where during the times of which he is speaking some of his speech becomes garbled.  Patient denies any other complaints.  Patient denies any difficulty with chewing or masticating food.  Patient denies any drainage of purulence into his mouth.  Patient denies any abnormal lesions in the mouth.  Patient is swallowing appropriately when ingesting food.    Inpatient consult to Acute Care Surgery  Consult performed by: Isaiah Abarca PA-C  Consult ordered by: Lore Silver DO          Review of Systems   Constitutional:  Negative  for appetite change, chills, fatigue and fever.   HENT:  Negative for congestion and rhinorrhea.    Respiratory:  Negative for cough, chest tightness, shortness of breath and wheezing.    Cardiovascular:  Negative for palpitations.   Gastrointestinal:  Negative for abdominal distention, abdominal pain, constipation, nausea and vomiting.   Genitourinary:  Negative for difficulty urinating and hematuria.   Musculoskeletal:  Negative for arthralgias and gait problem.   Skin:  Positive for color change and wound.   Neurological:  Negative for dizziness, weakness and numbness.   Psychiatric/Behavioral:  Negative for agitation and confusion.        Historical Information   Past Medical History:   Diagnosis Date    CKD     Diabetes mellitus (HCC)     Hyperlipidemia     Hypertension     Left toe amputee (HCC)     TIA (transient ischemic attack)      Past Surgical History:   Procedure Laterality Date    AMPUTATION Left     left foot resection 10 years ago dr tran    IR LOWER EXTREMITY ANGIOGRAM  08/29/2023    IR TEMPORARY DIALYSIS CATHETER PLACEMENT  08/25/2023    IR TUNNELED CENTRAL LINE REMOVAL  08/23/2023    IR TUNNELED DIALYSIS CATHETER PLACEMENT  01/27/2022    IR TUNNELED DIALYSIS CATHETER PLACEMENT  08/30/2023    ME AMPUTATION METATARSAL W/TOE SINGLE Right 8/31/2023    Procedure: RIGHT PARTIAL 1ST RAY RESECTION WITH  WOUND VAC APPLICATION;  Surgeon: Won Parmar DPM;  Location: [facility];  Service: Podiatry     Social History   Social History     Substance and Sexual Activity   Alcohol Use Not Currently    Alcohol/week: 0.0 standard drinks of alcohol    Comment: 0     Social History     Substance and Sexual Activity   Drug Use Never     E-Cigarette/Vaping    E-Cigarette Use Never User      E-Cigarette/Vaping Substances    Nicotine No     THC No     CBD No     Flavoring No     Other No     Unknown No      Social History     Tobacco Use   Smoking Status Never    Passive exposure: Never   Smokeless Tobacco Never  "    Family History: non-contributory    Meds/Allergies   all current active meds have been reviewed and current meds:   Current Facility-Administered Medications   Medication Dose Route Frequency    acetaminophen (TYLENOL) tablet 975 mg  975 mg Oral Q8H KEMAL    atorvastatin (LIPITOR) tablet 80 mg  80 mg Oral Daily With Dinner    ceFAZolin (ANCEF) IVPB (premix in dextrose) 2,000 mg 50 mL  2,000 mg Intravenous Q24H    docusate sodium (COLACE) capsule 100 mg  100 mg Oral BID    famotidine (PEPCID) tablet 10 mg  10 mg Oral Daily    gabapentin (NEURONTIN) capsule 300 mg  300 mg Oral TID    heparin (porcine) subcutaneous injection 5,000 Units  5,000 Units Subcutaneous Q8H KEMAL    insulin glargine (LANTUS) subcutaneous injection 6 Units 0.06 mL  6 Units Subcutaneous HS    insulin lispro (HumALOG/ADMELOG) 100 units/mL subcutaneous injection 1-6 Units  1-6 Units Subcutaneous HS    insulin lispro (HumALOG/ADMELOG) 100 units/mL subcutaneous injection 1-6 Units  1-6 Units Subcutaneous TID AC    mupirocin (BACTROBAN) 2 % ointment   Topical TID    NIFEdipine (PROCARDIA XL) 24 hr tablet 30 mg  30 mg Oral 2 times per day on Sunday Monday Wednesday Friday    NIFEdipine (PROCARDIA XL) 24 hr tablet 30 mg  30 mg Oral Once per day on Tuesday Thursday Saturday    ondansetron (ZOFRAN) injection 4 mg  4 mg Intravenous Q6H PRN    polyethylene glycol (MIRALAX) packet 17 g  17 g Oral Daily    saccharomyces boulardii (FLORASTOR) capsule 250 mg  250 mg Oral BID    sevelamer (RENAGEL) tablet 800 mg  800 mg Oral TID With Meals     No Known Allergies    Objective   First Vitals:   Blood Pressure: (!) 176/73 (03/12/24 0924)  Pulse: 79 (03/12/24 0924)  Temperature: 99.8 °F (37.7 °C) (03/12/24 0924)  Temp Source: Oral (03/12/24 0924)  Respirations: 18 (03/12/24 0924)  Height: 5' 8\" (172.7 cm) (03/18/24 2011)  Weight - Scale: 105 kg (232 lb 9.4 oz) (03/20/24 0100)  SpO2: 99 % (03/12/24 0924)    Current Vitals:   Blood Pressure: 153/75 (03/20/24 " "1900)  Pulse: 75 (03/20/24 1900)  Temperature: 98.1 °F (36.7 °C) (03/20/24 1900)  Temp Source: Oral (03/20/24 1900)  Respirations: 19 (03/20/24 1900)  Height: 5' 8\" (172.7 cm) (03/19/24 1052)  Weight - Scale: 105 kg (232 lb 9.4 oz) (03/20/24 0100)  SpO2: 99 % (03/20/24 1900)    No intake or output data in the 24 hours ending 03/21/24 0751    Invasive Devices       Peripheral Intravenous Line  Duration             Peripheral IV 03/14/24 Left;Ventral (anterior) Forearm 6 days              Hemodialysis Catheter  Duration             HD Permanent Double Catheter 203 days                    Physical Exam  Constitutional:       General: He is awake. He is not in acute distress.     Appearance: Normal appearance. He is normal weight. He is not ill-appearing, toxic-appearing or diaphoretic.   HENT:      Head: Normocephalic and atraumatic.      Jaw: Tenderness and swelling present.      Right Ear: Hearing and external ear normal.      Left Ear: Hearing and external ear normal.      Nose: Nose normal.      Mouth/Throat:      Lips: No lesions.      Mouth: Mucous membranes are moist. No injury.      Pharynx: Oropharynx is clear.   Cardiovascular:      Rate and Rhythm: Normal rate and regular rhythm.      Heart sounds: Normal heart sounds, S1 normal and S2 normal. Heart sounds not distant. No murmur heard.  Pulmonary:      Effort: Pulmonary effort is normal.      Breath sounds: Normal breath sounds. No stridor, decreased air movement or transmitted upper airway sounds. No decreased breath sounds, wheezing, rhonchi or rales.   Abdominal:      General: Abdomen is flat. There is no distension.      Tenderness: There is no abdominal tenderness.   Skin:     General: Skin is warm and dry.      Findings: Erythema, rash and wound present. Rash is pustular.          Neurological:      Mental Status: He is alert and oriented to person, place, and time.      Comments: Neurologic tick with speech   Psychiatric:         Behavior: Behavior is " cooperative.         Lab Results: I have personally reviewed pertinent lab results.  ,     Imaging: I have personally reviewed pertinent reports.    EKG, Pathology, and Other Studies: I have personally reviewed pertinent reports.      Counseling / Coordination of Care  Total floor / unit time spent today 35 minutes.  Greater than 50% of total time was spent with the patient and / or family counseling and / or coordination of care.  A description of the counseling / coordination of care: Developing plan, reviewing with attending, coordinating care, physical exam, history, reviewing, reviewing imaging, patient education.

## 2024-03-21 NOTE — ASSESSMENT & PLAN NOTE
Patient reported some difficulty with his speech and intermittent tremors of his hands and legs which started yesterday  Unclear if this could be related to Flagyl use, possibly due to cefazolin  CT head negative for acute pathology  MRI was ordered but patient adamant that he does not want MRI done even if premedicated.  He understands that his symptoms could be due to a stroke and a CT scan can miss a stroke but he is not interested.  This was discussed with patient again today

## 2024-03-21 NOTE — ASSESSMENT & PLAN NOTE
Patient presented with acute onset right facial pain, with associated redness, swelling    CT face: Extensive superficial and deep soft tissue cellulitis involving multiple spaces in the right greater than left face and neck extending inferiorly to the level of thyroid cartilage as described. The caudal portion of the inflammatory change is not completely imaged. Visualized airways are patent. No abscess. 2.  Artifact from prior dental fillings prevents reliable evaluation for dental caries. There is no periapical disease. 3.  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  Hx: Pseudomonas/MRSA-negative  ED: Vancomycin/Rocephin  ABX: Vancomycin/cefepime/Metronizadole was discontinued and currently on cefazolin.  Dose adjusted as patient is dialysis dependent  Wcx: Staph aureus  Initial U/S face: Cellulitis in the region of the right cheek without drainable organized fluid collection. focal area of subcutaneous fluid measuring 2 cm, which could represent a developing collection was noted  (3/18)repeat U/S: Showed cellulitis of the right cheek with underlying soft tissue edema but no fluid collection.  Enlarging, somewhat focal area of edema measuring up to 4 cm, previously 2 cm, possibly developing collection.  Per ENT previously, Warm compress as tolerated/ No surgical plans at this time  Apply bactroban ointment to right cheek lesion BID  Discussed with ENT again today and per ENT no intervention planned  Discussed with general surgery who will evaluate the patient in AM.  Surgery requesting repeat ultrasound  Sophia ID recs d/w :   continue current antibotics, long course for resolution  Patient did get cefazolin on Saturday with dialysis 3 g

## 2024-03-21 NOTE — ASSESSMENT & PLAN NOTE
Historical diagnosis  Per podiatry patient has preulcerative calluses on bilateral feet which were reduced  Podiatry recommended custom molded foot and ankle orthotic and inlays which patient stated he would consider getting his outpatient.   never used/caffeine

## 2024-03-21 NOTE — PROGRESS NOTES
[facility]  Progress Note  Name: Naresh Carpio I  MRN: [id number]  Unit/Bed#: 3 [place] 328-01 I Date of Admission: 3/12/2024   Date of Service: 3/21/2024 I Hospital Day: 9    Assessment/Plan   * Facial cellulitis  Assessment & Plan  Patient presented with acute onset right facial pain, with associated redness, swelling    CT face: Extensive superficial and deep soft tissue cellulitis involving multiple spaces in the right greater than left face and neck extending inferiorly to the level of thyroid cartilage as described. The caudal portion of the inflammatory change is not completely imaged. Visualized airways are patent. No abscess. 2.  Artifact from prior dental fillings prevents reliable evaluation for dental caries. There is no periapical disease. 3.  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  Hx: Pseudomonas/MRSA-negative  ED: Vancomycin/Rocephin  ABX: Vancomycin/cefepime/Metronizadole was discontinued and currently on cefazolin.  Dose adjusted as patient is dialysis dependent  Wcx: MSSA  Initial U/S face: Cellulitis in the region of the right cheek without drainable organized fluid collection. focal area of subcutaneous fluid measuring 2 cm, which could represent a developing collection was noted  (3/18)repeat U/S: Showed cellulitis of the right cheek with underlying soft tissue edema but no fluid collection.  Enlarging, somewhat focal area of edema measuring up to 4 cm, previously 2 cm, possibly developing collection.  Repeat ultrasound 3/21 -skin thickening and focal edema/phlegmon which has improved  Per ENT previously, Warm compress as tolerated/ No surgical plans at this time  Apply bactroban ointment to right cheek lesion BID  Discussed with ENT again on 3/20 and per ENT no intervention planned  Patient was seen by general surgery with plan to do bedside I&D however when surgery was about to start the procedure, patient  changed his mind   Sophia ID recs d/w :   continue current antibotics, long course for resolution  Patient to get cefazolin on Saturday with dialysis 3 g            Difficulty with speech  Assessment & Plan  Patient reported some difficulty with his speech and intermittent tremors of his hands and legs which started 3/18  Unclear if this could be related to Flagyl/cefazolin use  CT head negative for acute pathology  MRI was ordered but patient adamant that he does not want MRI done even if premedicated.  He understands that his symptoms could be due to a stroke and a CT scan can miss a stroke but he is not interested.  This was discussed with patient multiple times including today    ESRD (end stage renal disease) ([facility])  Assessment & Plan  Lab Results   Component Value Date    EGFR 7 03/20/2024    EGFR 6 03/18/2024    EGFR 6 03/16/2024    CREATININE 6.78 (H) 03/20/2024    CREATININE 7.74 (H) 03/18/2024    CREATININE 8.20 (H) 03/16/2024     Continue home sevelamer.  Gets HD on TTS.  Continue HD per nephrology    Type 2 diabetes mellitus, without long-term current use of insulin ([facility])  Assessment & Plan  Lab Results   Component Value Date    HGBA1C 5.4 03/12/2024     Increase to 10 units of Lantus.  Decrease to algorithm 1 SSI with meals and discontinue SSI at bedtime as blood sugars are very well-controlled   Carb controlled diet    Essential hypertension  Assessment & Plan  Continue nifedipine    Charcot's joint  Assessment & Plan  Historical diagnosis  Per podiatry patient has preulcerative calluses on bilateral feet which were reduced  Podiatry recommended custom molded foot and ankle orthotic and inlays which patient stated he would consider getting his outpatient    Electrolyte abnormality-resolved as of 3/20/2024  Assessment & Plan  NA-130 >135  K-5.4 >wnl  Cl-94>wnl               VTE Pharmacologic Prophylaxis:    heparin    Mobility:   Basic Mobility Inpatient Raw Score: 24  JH-HLM Goal: 8: Walk 250 feet or  more  JH-HLM Achieved: 8: Walk 250 feet ot more  JH-HLM Goal achieved. Continue to encourage appropriate mobility.    Patient Centered Rounds: I performed bedside rounds with nursing staff today.   Discussions with Specialists or Other Care Team Provider: yes - surgery    Education and Discussions with Family / Patient: yes    Total Time Spent on Date of Encounter in care of patient:  This time was spent on one or more of the following: performing physical exam; counseling and coordination of care; obtaining or reviewing history; documenting in the medical record; reviewing/ordering tests, medications or procedures; communicating with other healthcare professionals and discussing with patient's family/caregivers.    Current Length of Stay: 9 day(s)  Current Patient Status: Inpatient   Certification Statement: The patient will continue to require additional inpatient hospital stay due to facial cellulitis requiring possible I&D tomorrow  Discharge Plan: Anticipate discharge tomorrow to home.    Code Status: Level 1 - Full Code    Subjective:     Patient states the swelling of the lower right side of his face is improving.  No further tremors.  Still with some intermittent speech difficulty although this is improving as well    Objective:     Vitals:   Temp (24hrs), Av.8 °F (36.6 °C), Min:97.6 °F (36.4 °C), Max:98.1 °F (36.7 °C)    Temp:  [97.6 °F (36.4 °C)-98.1 °F (36.7 °C)] 97.6 °F (36.4 °C)  HR:  [64-75] 64  Resp:  [16-19] 16  BP: (130-154)/(61-75) 137/62  SpO2:  [98 %-99 %] 99 %  Body mass index is 35.36 kg/m².     Input and Output Summary (last 24 hours):     Intake/Output Summary (Last 24 hours) at 3/21/2024 0913  Last data filed at 3/21/2024 0740  Gross per 24 hour   Intake 200 ml   Output --   Net 200 ml       Physical Exam:   Physical Exam  Vitals reviewed.   Constitutional:       General: He is not in acute distress.     Appearance: He is not ill-appearing.   HENT:      Head: Normocephalic.       Comments: Right cheek with improved swelling, some induration noted but no obvious fluctuance.  Nontender  Eyes:      General:         Right eye: No discharge.         Left eye: No discharge.   Cardiovascular:      Rate and Rhythm: Normal rate and regular rhythm.   Pulmonary:      Effort: Pulmonary effort is normal. No respiratory distress.      Breath sounds: Normal breath sounds. No wheezing or rales.   Abdominal:      General: Bowel sounds are normal. There is no distension.      Palpations: Abdomen is soft.      Tenderness: There is no abdominal tenderness.   Neurological:      Mental Status: He is alert and oriented to person, place, and time.      Comments: Some intermittent dysarthria noted          Additional Data:     Labs:  Results from last 7 days   Lab Units 03/20/24  0530 03/19/24  0509   WBC Thousand/uL 7.76 8.86   HEMOGLOBIN g/dL 10.3* 10.7*   HEMATOCRIT % 32.2* 33.1*   PLATELETS Thousands/uL 259 270   NEUTROS PCT %  --  65   LYMPHS PCT %  --  12*   MONOS PCT %  --  12   EOS PCT %  --  8*     Results from last 7 days   Lab Units 03/20/24  0530   SODIUM mmol/L 135   POTASSIUM mmol/L 4.4   CHLORIDE mmol/L 100   CO2 mmol/L 26   BUN mg/dL 37*   CREATININE mg/dL 6.78*   ANION GAP mmol/L 9   CALCIUM mg/dL 8.0*   GLUCOSE RANDOM mg/dL 62*         Results from last 7 days   Lab Units 03/21/24  0708 03/20/24  2111 03/20/24  1557 03/20/24  1050 03/20/24  0716 03/20/24  0642 03/19/24  2156 03/19/24  1734 03/19/24  1147 03/19/24  0819 03/19/24  0727 03/18/24  2037   POC GLUCOSE mg/dl 77 107 141* 116 86 68 117 113 65 73 69 98               Lines/Drains:  Invasive Devices       Peripheral Intravenous Line  Duration             Peripheral IV 03/14/24 Left;Ventral (anterior) Forearm 6 days              Hemodialysis Catheter  Duration             HD Permanent Double Catheter 203 days                          Imaging: Reviewed radiology reports from this admission including: ultrasound(s)    Recent Cultures (last 7  days):   Results from last 7 days   Lab Units 03/18/24 1927   GRAM STAIN RESULT  No Polys or Bacteria seen   WOUND CULTURE  2+ Growth of Staphylococcus aureus*       Last 24 Hours Medication List:   Current Facility-Administered Medications   Medication Dose Route Frequency Provider Last Rate    acetaminophen  975 mg Oral Q8H [id number]      atorvastatin  80 mg Oral Daily With Dinner Angie Bernard MD      cefazolin  2,000 mg Intravenous Q24H Lore Silver DO      docusate sodium  100 mg Oral BID Angie Bernard MD      famotidine  10 mg Oral Daily Angie Bernard MD      gabapentin  300 mg Oral TID Angie Bernard MD      heparin (porcine)  5,000 Units Subcutaneous Q8H [facility] Angie Bernard MD      insulin glargine  6 Units Subcutaneous HS Lore Silver DO      insulin lispro  1-6 Units Subcutaneous HS Niharika Russell PA-C      insulin lispro  1-6 Units Subcutaneous TID AC Lore Silver DO      mupirocin   Topical TID Angie Bernard MD      NIFEdipine  30 mg Oral 2 times per day on Sunday Monday Wednesday Friday Kalli Mcbride PA-C      NIFEdipine  30 mg Oral Once per day on Tuesday Thursday Saturday Kalli Mcbride PA-C      ondansetron  4 mg Intravenous Q6H PRN Angie Bernard MD      polyethylene glycol  17 g Oral Daily Angie Bernard MD      saccharomyces boulardii  250 mg Oral BID Angie Bernard MD      sevelamer  800 mg Oral TID With Meals Angie Bernard MD          Today, Patient Was Seen By: Lore Silver DO    **Please Note: This note may have been constructed using a voice recognition system.**

## 2024-03-21 NOTE — ASSESSMENT & PLAN NOTE
Patient reported some difficulty with his speech and intermittent tremors of his hands and legs which started 3/18  Unclear if this could be related to Flagyl/cefazolin use  CT head negative for acute pathology  MRI was ordered but patient adamant that he does not want MRI done even if premedicated.  He understands that his symptoms could be due to a stroke and a CT scan can miss a stroke but he is not interested.  This was discussed with patient multiple times including today

## 2024-03-21 NOTE — PROGRESS NOTES
NEPHROLOGY PROGRESS NOTE   Naresh Carpio 63 y.o. male MRN: [id number]  Unit/Bed#: [address] [place] [address] Encounter: [id number]  Reason for Consult: Management of ESRD    ASSESSMENT AND PLAN:  64 yo man with PMH day of ESRD on HD TTS p/w facial cellulitis.  Nephrology is consulted for management of ESRD     PLAN:     #ESRD on HD TTS:  Dialysis unit/days: [facility]  Access: Right IJ PermCath  Plan to continue to use per schedule, next HD today  Fluid restriction 1-1.5L/d  Adjust medications to GFR<10  Avoid opioids   Patient will continue dialysis at outpatient dialysis unit on discharge  Will require antibiotic       #Volume status/hypertension:  Volume: Euvolemic on exam  Blood pressure: Hypertensive, /75, goal less than 140/90    Low-sodium diet  New  kg  Nifedipine 30 mg twice daily, avoid BP meds before dialysis to avoid intradialytic hypotension  Monitor BP     #Secondary Hyperparasitoidism   Continue sevelamer with meals  Low phosphorus diet     # Anemia of Kidney Disease  Current hemoglobin: 10.3 mg/dL  Treatment:  On Mircera as an outpatient  Transfuse for hemoglobin less than 7.0 per primary service       # Facial cellulitis  Antibiotics as per primary team  Patient will require cefazolin on dialysis 2 mg/2 mg / 3 mg for 1 week      SUBJECTIVE:  Patient seen and examined at bedside. No chest pain, shortness of breath, nausea, vomiting, abdominal pain or diarrhea.   OBJECTIVE:  Current Weight: Weight - Scale: 105 kg (232 lb 9.4 oz)  Vitals:    03/21/24 0900   BP: 137/62   Pulse: 64   Resp: 16   Temp:    SpO2:        Intake/Output Summary (Last 24 hours) at 3/21/2024 0910  Last data filed at 3/21/2024 0740  Gross per 24 hour   Intake 200 ml   Output --   Net 200 ml     Wt Readings from Last 3 Encounters:   03/20/24 105 kg (232 lb 9.4 oz)   01/24/24 109 kg (240 lb)   09/13/23 107 kg (235 lb)     Temp Readings from Last 3 Encounters:   03/21/24 97.6 °F (36.4 °C) (Tympanic)   01/24/24 98 °F (36.7 °C) (Oral)    09/06/23 97.7 °F (36.5 °C) (Oral)     BP Readings from Last 3 Encounters:   03/21/24 137/62   01/24/24 (!) 194/84   09/13/23 136/76     Pulse Readings from Last 3 Encounters:   03/21/24 64   01/24/24 82   09/13/23 64        General:  no acute distress at this time  Skin:  No acute rash  Eyes:  No scleral icterus and noninjected  ENT:  mucous membranes moist  Neck:  no carotid bruits  Chest:  Clear to auscultation percussion, good respiratory effort, no use of accessory respiratory muscles  CVS:  Regular rate and rhythm without rub   Abdomen:  soft and nontender   Extremities: no significant lower extremity edema  Neuro:  No gross focality  Psych:  Alert , cooperative       Medications:    Current Facility-Administered Medications:     acetaminophen (TYLENOL) tablet 975 mg, 975 mg, Oral, Q8H [facility], GRANT Rich, 975 mg at 03/20/24 1711    atorvastatin (LIPITOR) tablet 80 mg, 80 mg, Oral, Daily With Dinner, Angie Bernard MD, 80 mg at 03/20/24 1711    ceFAZolin (ANCEF) IVPB (premix in dextrose) 2,000 mg 50 mL, 2,000 mg, Intravenous, Q24H, Loer Silver DO    docusate sodium (COLACE) capsule 100 mg, 100 mg, Oral, BID, Angie Bernard MD, 100 mg at 03/19/24 1728    famotidine (PEPCID) tablet 10 mg, 10 mg, Oral, Daily, Angie Bernard MD, 10 mg at 03/20/24 0818    gabapentin (NEURONTIN) capsule 300 mg, 300 mg, Oral, TID, Angie Bernard MD, 300 mg at 03/20/24 2136    heparin (porcine) subcutaneous injection 5,000 Units, 5,000 Units, Subcutaneous, Q8H [facility], Angie Bernard MD, 5,000 Units at 03/21/24 0213    insulin glargine (LANTUS) subcutaneous injection 6 Units 0.06 mL, 6 Units, Subcutaneous, HS, Lore Silver DO, 6 Units at 03/20/24 2136    insulin lispro (HumALOG/ADMELOG) 100 units/mL subcutaneous injection 1-6 Units, 1-6 Units, Subcutaneous, HS, Niharika Russell PA-C, 2 Units at 03/17/24 2116    insulin lispro (HumALOG/ADMELOG) 100 units/mL subcutaneous injection 1-6 Units, 1-6 Units, Subcutaneous,  TID AC **AND** Fingerstick Glucose (POCT), , , 4x Daily AC and at bedtime, Lore Silver,     mupirocin (BACTROBAN) 2 % ointment, , Topical, TID, Angie Bernard MD, Given at 03/20/24 2141    NIFEdipine (PROCARDIA XL) 24 hr tablet 30 mg, 30 mg, Oral, 2 times per day on Sunday Monday Wednesday Friday, Kalli Mcbride PA-C, 30 mg at 03/20/24 1711    NIFEdipine (PROCARDIA XL) 24 hr tablet 30 mg, 30 mg, Oral, Once per day on Tuesday Thursday Saturday, Kalli Mcbride PA-C, 30 mg at 03/19/24 2015    ondansetron (ZOFRAN) injection 4 mg, 4 mg, Intravenous, Q6H PRN, Angie Bernard MD    polyethylene glycol (MIRALAX) packet 17 g, 17 g, Oral, Daily, Angie Bernard MD, 17 g at 03/19/24 0824    saccharomyces boulardii (FLORASTOR) capsule 250 mg, 250 mg, Oral, BID, Angie Bernard MD, 250 mg at 03/20/24 1710    sevelamer (RENAGEL) tablet 800 mg, 800 mg, Oral, TID With Meals, Angie Bernard MD, 800 mg at 03/20/24 1710    Laboratory Results:  Results from last 7 days   Lab Units 03/20/24  0530 03/19/24  0509 03/18/24  0437 03/17/24  0613 03/16/24  0541 03/15/24  0525   WBC Thousand/uL 7.76 8.86 9.88 10.20* 9.94 9.91   HEMOGLOBIN g/dL 10.3* 10.7* 10.8* 11.1* 10.8* 11.0*   HEMATOCRIT % 32.2* 33.1* 34.1* 35.5* 34.3* 34.7*   PLATELETS Thousands/uL 259 270 276 270 251 223   SODIUM mmol/L 135  --  132*  --  133*  --    POTASSIUM mmol/L 4.4  --  4.4  --  4.6  --    CHLORIDE mmol/L 100  --  96  --  97  --    CO2 mmol/L 26  --  25  --  23  --    BUN mg/dL 37*  --  47*  --  58*  --    CREATININE mg/dL 6.78*  --  7.74*  --  8.20*  --    CALCIUM mg/dL 8.0*  --  7.8*  --  7.9*  --        CT head wo contrast   Final Result by Nolan Estrada MD (03/19 5057)      No acute intracranial abnormality.      Mild chronic microangiopathic ischemic changes.Chronic lacunar infarcts versus dilated perivascular spaces within the bilateral sublentiform regions.      Right premaxillary and perimandibular cellulitis, partially imaged, with no no  "suspicious drainable collection, however, evaluation is limited due to lack of IV contrast.                              Workstation performed: [id number]         US head neck soft tissue   Final Result by Myra Chowdary MD (03/18 1335)      Redemonstrated findings of cellulitis of the right cheek with underlying soft tissue edema and without focal drainable fluid collection identified. Enlarging, somewhat focal area of edema measuring up to 4 cm, previously 2 cm, possibly developing    collection.      The study was marked in EPIC for significant notification.         Workstation performed: [id number]         US head neck soft tissue   Final Result by Milan Posada MD (03/15 0821)      Cellulitis in the region of the right cheek without drainable organized fluid collection. However, there is a somewhat more focal area of subcutaneous fluid measuring approximately 2 cm, which could represent a developing collection.         Workstation performed: [id number]         CT facial bones w contrast   Final Result by Satnosh Shah MD (03/12 1033)      1.  Extensive superficial and deep soft tissue cellulitis involving multiple spaces in the right greater than left face and neck extending inferiorly to the level of thyroid cartilage as described. The caudal portion of the inflammatory change is not    completely imaged. Visualized airways are patent. No abscess.   2.  Artifact from prior dental fillings prevents reliable evaluation for dental caries. There is no periapical disease.   3.  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         Workstation performed: [id number]         US head neck soft tissue    (Results Pending)       Portions of the record may have been created with voice recognition software. Occasional wrong word or \"sound a like\" substitutions may have occurred due to the inherent limitations of voice recognition software. Read the chart carefully and " recognize, using context, where substitutions have occurred.

## 2024-03-21 NOTE — PROGRESS NOTES
[facility]  Progress Note  Name: Naresh Carpio I  MRN: [id number]  Unit/Bed#: [address] [address] I Date of Admission: 3/12/2024   Date of Service: 3/20/2024 I Hospital Day: 8    Assessment/Plan   * Facial cellulitis  Assessment & Plan  Patient presented with acute onset right facial pain, with associated redness, swelling    CT face: Extensive superficial and deep soft tissue cellulitis involving multiple spaces in the right greater than left face and neck extending inferiorly to the level of thyroid cartilage as described. The caudal portion of the inflammatory change is not completely imaged. Visualized airways are patent. No abscess. 2.  Artifact from prior dental fillings prevents reliable evaluation for dental caries. There is no periapical disease. 3.  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  Hx: Pseudomonas/MRSA-negative  ED: Vancomycin/Rocephin  ABX: Vancomycin/cefepime/Metronizadole was discontinued and currently on cefazolin.  Dose adjusted as patient is dialysis dependent  Wcx: Staph aureus  Initial U/S face: Cellulitis in the region of the right cheek without drainable organized fluid collection. focal area of subcutaneous fluid measuring 2 cm, which could represent a developing collection was noted  (3/18)repeat U/S: Showed cellulitis of the right cheek with underlying soft tissue edema but no fluid collection.  Enlarging, somewhat focal area of edema measuring up to 4 cm, previously 2 cm, possibly developing collection.  Per ENT previously, Warm compress as tolerated/ No surgical plans at this time  Apply bactroban ointment to right cheek lesion BID  Discussed with ENT again today and per ENT no intervention planned  Discussed with general surgery who will evaluate the patient in AM.  Surgery requesting repeat ultrasound  Curbside ID recs d/w :   continue current antibotics, long course for resolution  Patient did get  cefazolin on Saturday with dialysis 3 g            Difficulty with speech  Assessment & Plan  Patient reported some difficulty with his speech and intermittent tremors of his hands and legs which started yesterday  Unclear if this could be related to Flagyl use, possibly due to cefazolin  CT head negative for acute pathology  MRI was ordered but patient adamant that he does not want MRI done even if premedicated.  He understands that his symptoms could be due to a stroke and a CT scan can miss a stroke but he is not interested.  This was discussed with patient again today    ESRD (end stage renal disease) ([facility])  Assessment & Plan  Lab Results   Component Value Date    EGFR 7 03/20/2024    EGFR 6 03/18/2024    EGFR 6 03/16/2024    CREATININE 6.78 (H) 03/20/2024    CREATININE 7.74 (H) 03/18/2024    CREATININE 8.20 (H) 03/16/2024     Continue home sevelamer  Gets HD on TTS.  Continue HD per nephrology    Type 2 diabetes mellitus, without long-term current use of insulin ([facility])  Assessment & Plan  Lab Results   Component Value Date    HGBA1C 5.4 03/12/2024     Decreased to 6 units of Lantus based on blood sugars as patient will be n.p.o. after midnight.  It appears that patient does not take mealtime insulin and Humalog discontinued  Continue SSI  Carb controlled diet    Essential hypertension  Assessment & Plan  Continue nifedipine.    Charcot's joint  Assessment & Plan  Historical diagnosis  Per podiatry patient has preulcerative calluses on bilateral feet which were reduced  Podiatry recommended custom molded foot and ankle orthotic and inlays which patient stated he would consider getting his outpatient.    Electrolyte abnormality-resolved as of 3/20/2024  Assessment & Plan  NA-130 >135  K-5.4 >wnl  Cl-94>wnl               VTE Pharmacologic Prophylaxis:    Heparin    Mobility:   Basic Mobility Inpatient Raw Score: 24  JH-HLM Goal: 8: Walk 250 feet or more  JH-HLM Achieved: 7: Walk 25 feet or more  JH-HLM Goal NOT  achieved. Continue with multidisciplinary rounding and encourage appropriate mobility to improve upon [facility] goals.    Patient Centered Rounds: I performed bedside rounds with nursing staff today.   Discussions with Specialists or Other Care Team Provider: yes - surgery, ENT    Education and Discussions with Family / Patient: Patient declined call to .     Total Time Spent on Date of Encounter in care of patient: This time was spent on one or more of the following: performing physical exam; counseling and coordination of care; obtaining or reviewing history; documenting in the medical record; reviewing/ordering tests, medications or procedures; communicating with other healthcare professionals and discussing with patient's family/caregivers.    Current Length of Stay: 8 day(s)  Current Patient Status: Inpatient   Certification Statement: The patient will continue to require additional inpatient hospital stay due to right facial cellulitis requiring surgical evaluation  Discharge Plan: Anticipate discharge in 24-48 hrs to home.    Code Status: Level 1 - Full Code    Subjective:     Patient states his speech and tremors have improved today.  Still declining MRI.  States that the drainage from his right cheek has stopped today.    Objective:     Vitals:   Temp (24hrs), Av.8 °F (36.6 °C), Min:97.4 °F (36.3 °C), Max:98.1 °F (36.7 °C)    Temp:  [97.4 °F (36.3 °C)-98.1 °F (36.7 °C)] 98.1 °F (36.7 °C)  HR:  [71-75] 75  Resp:  [18-19] 19  BP: (142-154)/(63-75) 153/75  SpO2:  [97 %-99 %] 99 %  Body mass index is 35.36 kg/m².     Input and Output Summary (last 24 hours):     Intake/Output Summary (Last 24 hours) at 3/20/2024 2120  Last data filed at 3/20/2024 0214  Gross per 24 hour   Intake 50 ml   Output --   Net 50 ml       Physical Exam:   Physical Exam  Vitals reviewed.   Constitutional:       General: He is not in acute distress.     Appearance: He is not toxic-appearing.   HENT:      Head:  Normocephalic.      Comments: Right cheek with induration but no obvious fluctuance.  Swelling has improved compared to before.  No significant tenderness.  erythema noted  Eyes:      General:         Right eye: No discharge.         Left eye: No discharge.   Cardiovascular:      Rate and Rhythm: Normal rate and regular rhythm.   Pulmonary:      Effort: Pulmonary effort is normal. No respiratory distress.      Breath sounds: Normal breath sounds. No wheezing or rales.   Abdominal:      General: Bowel sounds are normal. There is no distension.      Palpations: Abdomen is soft.      Tenderness: There is no abdominal tenderness.   Musculoskeletal:      Left lower leg: No edema.   Neurological:      Mental Status: He is alert and oriented to person, place, and time.      Comments: Some mild intermittent dysarthria noted.          Additional Data:     Labs:  Results from last 7 days   Lab Units 03/20/24  0530 03/19/24  0509   WBC Thousand/uL 7.76 8.86   HEMOGLOBIN g/dL 10.3* 10.7*   HEMATOCRIT % 32.2* 33.1*   PLATELETS Thousands/uL 259 270   NEUTROS PCT %  --  65   LYMPHS PCT %  --  12*   MONOS PCT %  --  12   EOS PCT %  --  8*     Results from last 7 days   Lab Units 03/20/24  0530   SODIUM mmol/L 135   POTASSIUM mmol/L 4.4   CHLORIDE mmol/L 100   CO2 mmol/L 26   BUN mg/dL 37*   CREATININE mg/dL 6.78*   ANION GAP mmol/L 9   CALCIUM mg/dL 8.0*   GLUCOSE RANDOM mg/dL 62*         Results from last 7 days   Lab Units 03/20/24  2111 03/20/24  1557 03/20/24  1050 03/20/24  0716 03/20/24  0642 03/19/24  2156 03/19/24  1734 03/19/24  1147 03/19/24  0819 03/19/24  0727 03/18/24  2037 03/18/24  1601   POC GLUCOSE mg/dl 107 141* 116 86 68 117 113 65 73 69 98 104               Lines/Drains:  Invasive Devices       Peripheral Intravenous Line  Duration             Peripheral IV 03/14/24 Left;Ventral (anterior) Forearm 5 days              Hemodialysis Catheter  Duration             HD Permanent Double Catheter 203 days                           Imaging: No pertinent imaging reviewed.    Recent Cultures (last 7 days):   Results from last 7 days   Lab Units 03/18/24 1927   GRAM STAIN RESULT  No Polys or Bacteria seen   WOUND CULTURE  2+ Growth of Staphylococcus aureus*       Last 24 Hours Medication List:   Current Facility-Administered Medications   Medication Dose Route Frequency Provider Last Rate    acetaminophen  975 mg Oral Q8H [facility] [place] [id number]      atorvastatin  80 mg Oral Daily With Dinner Angie Bernard MD      [START ON 3/21/2024] cefazolin  2,000 mg Intravenous Q24H Lore Silver DO      docusate sodium  100 mg Oral BID Angie Bernard MD      famotidine  10 mg Oral Daily Angie Bernard MD      gabapentin  300 mg Oral TID Angie Bernard MD      heparin (porcine)  5,000 Units Subcutaneous Q8H [facility] Angie Bernard MD      insulin glargine  6 Units Subcutaneous HS Lore Silver DO      insulin lispro  1-6 Units Subcutaneous HS Niharika Russell PA-C      [START ON 3/21/2024] insulin lispro  1-6 Units Subcutaneous TID AC Lore Silver DO      mupirocin   Topical TID Angie Bernard MD      NIFEdipine  30 mg Oral 2 times per day on Sunday Monday Wednesday Friday Kalli Mcbride PA-C      NIFEdipine  30 mg Oral Once per day on Tuesday Thursday Saturday Kalli Mcbride PA-C      ondansetron  4 mg Intravenous Q6H PRN Angie Bernard MD      polyethylene glycol  17 g Oral Daily Angie Bernard MD      saccharomyces boulardii  250 mg Oral BID Angie Bernard MD      sevelamer  800 mg Oral TID With Meals Angie Bernard MD          Today, Patient Was Seen By: Lore Silver DO    **Please Note: This note may have been constructed using a voice recognition system.**

## 2024-03-21 NOTE — ASSESSMENT & PLAN NOTE
Lab Results   Component Value Date    EGFR 7 03/20/2024    EGFR 6 03/18/2024    EGFR 6 03/16/2024    CREATININE 6.78 (H) 03/20/2024    CREATININE 7.74 (H) 03/18/2024    CREATININE 8.20 (H) 03/16/2024     Continue home sevelamer  Gets HD on TTS.  Continue HD per nephrology

## 2024-03-21 NOTE — ASSESSMENT & PLAN NOTE
Patient presented with acute onset right facial pain, with associated redness, swelling    CT face: Extensive superficial and deep soft tissue cellulitis involving multiple spaces in the right greater than left face and neck extending inferiorly to the level of thyroid cartilage as described. The caudal portion of the inflammatory change is not completely imaged. Visualized airways are patent. No abscess. 2.  Artifact from prior dental fillings prevents reliable evaluation for dental caries. There is no periapical disease. 3.  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  Hx: Pseudomonas/MRSA-negative  ED: Vancomycin/Rocephin  ABX: Vancomycin/cefepime/Metronizadole was discontinued and currently on cefazolin.  Dose adjusted as patient is dialysis dependent  Wcx: MSSA  Initial U/S face: Cellulitis in the region of the right cheek without drainable organized fluid collection. focal area of subcutaneous fluid measuring 2 cm, which could represent a developing collection was noted  (3/18)repeat U/S: Showed cellulitis of the right cheek with underlying soft tissue edema but no fluid collection.  Enlarging, somewhat focal area of edema measuring up to 4 cm, previously 2 cm, possibly developing collection.  Repeat ultrasound 3/21 -skin thickening and focal edema/phlegmon which has improved  Per ENT previously, Warm compress as tolerated/ No surgical plans at this time  Apply bactroban ointment to right cheek lesion BID  Discussed with ENT again on 3/20 and per ENT no intervention planned  Patient was seen by general surgery with plan to do bedside I&D however when surgery was about to start the procedure, patient changed his mind   Sophia ID recs d/w :   continue current antibotics, long course for resolution  Patient to get cefazolin on Saturday with dialysis 3 g

## 2024-03-21 NOTE — HEMODIALYSIS
Post-Dialysis RN Treatment Note    Blood Pressure:  Pre 154/65 mm/Hg  Post 139/69 mmHg   EDW  108 kg    Weight:  Pre 108.2 kg   Post 105.8 kg   Mode of weight measurement: Standing Scale   Volume Removed  2000 ml    Treatment duration 210 minutes    NS given  No    Treatment shortened? No   Medications given during Rx Cefazolin 2 g   Estimated Kt/V  Not Applicable   Access type: Permacath/TDC   Access Issues: No    Report called to primary nurse   Yes Felicita SEGOVIA RN

## 2024-03-21 NOTE — PLAN OF CARE
Pt on HD treatment for 4 hours with a UF goal of 2 L as tolerated. Pt on a 3 k 2.5 basim bath for a potassium of 4.4 on 03/20/24.  Problem: METABOLIC, FLUID AND ELECTROLYTES - ADULT  Goal: Electrolytes maintained within normal limits  Description: INTERVENTIONS:  - Monitor labs and assess patient for signs and symptoms of electrolyte imbalances  - Administer electrolyte replacement as ordered  - Monitor response to electrolyte replacements, including repeat lab results as appropriate  - Instruct patient on fluid and nutrition as appropriate  Outcome: Progressing  Goal: Fluid balance maintained  Description: INTERVENTIONS:  - Monitor labs   - Monitor I/O and WT  - Instruct patient on fluid and nutrition as appropriate  - Assess for signs & symptoms of volume excess or deficit  Outcome: Progressing

## 2024-03-21 NOTE — QUICK NOTE
Chart reviewed.  Labs reviewed.  Vitals reviewed.  Last hemodialysis session was yesterday.  Plan for next hemodialysis session tomorrow.  Please call with questions in interim.  
Chart reviewed.  Labs reviewed.  Vitals reviewed.  Patient had hemodialysis yesterday.  Next hemodialysis session will be Tuesday.  Next nephrology follow-up will be tomorrow.  Please call with questions in the interim.  
Chart reviewed.  Vitals reviewed.  Labs reviewed.  Patient had hemodialysis yesterday.  His next hemodialysis session will be tomorrow.  Please call with questions in the interim.  
Patient was discussed with the breast, benefits, alternatives of incision and drainage.  Ultrasound was reviewed with ultrasound technician.  Supplies were obtained for incision and drainage at bedside.  10 cc of lidocaine was drawn.  Patient at the point of which we were ready to perform incision and drainage reported that he was quite nervous and did not want to have a scar on his face.  Patient was concerned with what the outcome and what if there was a bleeding event.  After significant arie of education patient then voiced alternate concerns of feeling what if there is a complication.  Patient was also worried about what if he had paresthesias.  Patient was educated on the risks and small percent chance that these things could happen but was told that this is quite the minute show of outcomes for most incision and drainages we will be staying very superficial and this is a very routinely performed procedure.  Patient then advocated for diagnostic needle aspiration.  Patient was educated on the fact that needle aspiration unless there is a clear site that you suspect is going to have a collection may come back with a false negative and that a small incision and opening the wound and exploring it may provide more diagnostic and therapeutic value.    Upon significant discussion and education with the patient, patient advocated that he would rather forego incision and drainage or needle aspiration and wait another day until tomorrow.  We will follow peripherally and if patient changes his mind would like to be seen again you are happy to perform incision and drainage of small area that was draining purulence.  Otherwise patient just was educated on the alternatives portion of the consent can always consider slowly exploring medical management of facial cellulitis.  
no weakness

## 2024-03-21 NOTE — PLAN OF CARE
Problem: Potential for Falls  Goal: Patient will remain free of falls  Description: INTERVENTIONS:  - Educate patient/family on patient safety including physical limitations  - Instruct patient to call for assistance with activity   - Consult OT/PT to assist with strengthening/mobility   - Keep Call bell within reach  - Keep bed low and locked with side rails adjusted as appropriate  - Keep care items and personal belongings within reach  - Initiate and maintain comfort rounds  - Make Fall Risk Sign visible to staff  - Offer Toileting every 2 Hours, in advance of need  - Initiate/Maintain bed/chair alarm  - O  - Apply yellow socks and bracelet for high fall risk patients  - Consider moving patient to room near nurses station  Outcome: Progressing     Problem: METABOLIC, FLUID AND ELECTROLYTES - ADULT  Goal: Electrolytes maintained within normal limits  Description: INTERVENTIONS:  - Monitor labs and assess patient for signs and symptoms of electrolyte imbalances  - Administer electrolyte replacement as ordered  - Monitor response to electrolyte replacements, including repeat lab results as appropriate  - Instruct patient on fluid and nutrition as appropriate  Outcome: Progressing  Goal: Fluid balance maintained  Description: INTERVENTIONS:  - Monitor labs   - Monitor I/O and WT  - Instruct patient on fluid and nutrition as appropriate  - Assess for signs & symptoms of volume excess or deficit  Outcome: Progressing  Goal: Glucose maintained within target range  Description: INTERVENTIONS:  - Monitor Blood Glucose as ordered  - Assess for signs and symptoms of hyperglycemia and hypoglycemia  - Administer ordered medications to maintain glucose within target range  - Assess nutritional intake and initiate nutrition service referral as needed  Outcome: Progressing     Problem: PAIN - ADULT  Goal: Verbalizes/displays adequate comfort level or baseline comfort level  Description: Interventions:  - Encourage patient to  monitor pain and request assistance  - Assess pain using appropriate pain scale  - Administer analgesics based on type and severity of pain and evaluate response  - Implement non-pharmacological measures as appropriate and evaluate response  - Consider cultural and social influences on pain and pain management  - Notify physician/advanced practitioner if interventions unsuccessful or patient reports new pain  Outcome: Progressing     Problem: INFECTION - ADULT  Goal: Absence or prevention of progression during hospitalization  Description: INTERVENTIONS:  - Assess and monitor for signs and symptoms of infection  - Monitor lab/diagnostic results  - Monitor all insertion sites, i.e. indwelling lines, tubes, and drains  - Monitor endotracheal if appropriate and nasal secretions for changes in amount and color  - [place] appropriate cooling/warming therapies per order  - Administer medications as ordered  - Instruct and encourage patient and family to use good hand hygiene technique  - Identify and instruct in appropriate isolation precautions for identified infection/condition  Outcome: Progressing     Problem: SAFETY ADULT  Goal: Patient will remain free of falls  Description: INTERVENTIONS:  - Educate patient/family on patient safety including physical limitations  - Instruct patient to call for assistance with activity   - Consult OT/PT to assist with strengthening/mobility   - Keep Call bell within reach  - Keep bed low and locked with side rails adjusted as appropriate  - Keep care items and personal belongings within reach  - Initiate and maintain comfort rounds  - Make Fall Risk Sign visible to staff  - Offer Toileting every 2 Hours, in advance of need  - Initiate/Maintain bed/chair alarm  - O  - Apply yellow socks and bracelet for high fall risk patients  - Consider moving patient to room near nurses station  Outcome: Progressing     Problem: DISCHARGE PLANNING  Goal: Discharge to home or other facility with  appropriate resources  Description: INTERVENTIONS:  - Identify barriers to discharge w/patient and caregiver  - Arrange for needed discharge resources and transportation as appropriate  - Identify discharge learning needs (meds, wound care, etc.)  - Arrange for interpretive services to assist at discharge as needed  - Refer to Case Management Department for coordinating discharge planning if the patient needs post-hospital services based on physician/advanced practitioner order or complex needs related to functional status, cognitive ability, or social support system  Outcome: Progressing     Problem: Knowledge Deficit  Goal: Patient/family/caregiver demonstrates understanding of disease process, treatment plan, medications, and discharge instructions  Description: Complete learning assessment and assess knowledge base.  Interventions:  - Provide teaching at level of understanding  - Provide teaching via preferred learning methods  Outcome: Progressing

## 2024-03-21 NOTE — ASSESSMENT & PLAN NOTE
Lab Results   Component Value Date    EGFR 7 03/20/2024    EGFR 6 03/18/2024    EGFR 6 03/16/2024    CREATININE 6.78 (H) 03/20/2024    CREATININE 7.74 (H) 03/18/2024    CREATININE 8.20 (H) 03/16/2024     Continue home sevelamer.  Gets HD on TTS.  Continue HD per nephrology

## 2024-03-22 VITALS
OXYGEN SATURATION: 97 % | WEIGHT: 232.59 LBS | BODY MASS INDEX: 35.25 KG/M2 | TEMPERATURE: 97.8 F | HEIGHT: 68 IN | HEART RATE: 75 BPM | RESPIRATION RATE: 18 BRPM | SYSTOLIC BLOOD PRESSURE: 165 MMHG | DIASTOLIC BLOOD PRESSURE: 74 MMHG

## 2024-03-22 LAB
GLUCOSE SERPL-MCNC: 86 MG/DL (ref 65–140)
GLUCOSE SERPL-MCNC: 97 MG/DL (ref 65–140)

## 2024-03-22 PROCEDURE — 99232 SBSQ HOSP IP/OBS MODERATE 35: CPT | Performed by: PHYSICIAN ASSISTANT

## 2024-03-22 PROCEDURE — 82948 REAGENT STRIP/BLOOD GLUCOSE: CPT

## 2024-03-22 PROCEDURE — 99232 SBSQ HOSP IP/OBS MODERATE 35: CPT | Performed by: STUDENT IN AN ORGANIZED HEALTH CARE EDUCATION/TRAINING PROGRAM

## 2024-03-22 PROCEDURE — 99239 HOSP IP/OBS DSCHRG MGMT >30: CPT | Performed by: FAMILY MEDICINE

## 2024-03-22 RX ORDER — GABAPENTIN 100 MG/1
200 CAPSULE ORAL 2 TIMES DAILY
Qty: 120 CAPSULE | Refills: 0 | Status: SHIPPED | OUTPATIENT
Start: 2024-03-22 | End: 2024-04-21

## 2024-03-22 RX ORDER — CEFAZOLIN SODIUM 2 G/50ML
3000 SOLUTION INTRAVENOUS ONCE
Start: 2024-03-23 | End: 2024-03-23

## 2024-03-22 RX ORDER — ACETAMINOPHEN 325 MG/1
650 TABLET ORAL EVERY 6 HOURS PRN
Start: 2024-03-22

## 2024-03-22 RX ORDER — SACCHAROMYCES BOULARDII 250 MG
250 CAPSULE ORAL 2 TIMES DAILY
Qty: 30 CAPSULE | Refills: 0 | Status: SHIPPED | OUTPATIENT
Start: 2024-03-22

## 2024-03-22 RX ORDER — NIFEDIPINE 30 MG
TABLET, EXTENDED RELEASE ORAL
Start: 2024-03-22

## 2024-03-22 RX ORDER — ALLOPURINOL 100 MG/1
100 TABLET ORAL DAILY
Qty: 30 TABLET | Refills: 0 | Status: SHIPPED | OUTPATIENT
Start: 2024-03-22 | End: 2024-11-23

## 2024-03-22 RX ADMIN — HEPARIN SODIUM 5000 UNITS: 5000 INJECTION INTRAVENOUS; SUBCUTANEOUS at 10:40

## 2024-03-22 RX ADMIN — DOCUSATE SODIUM 100 MG: 100 CAPSULE, LIQUID FILLED ORAL at 08:12

## 2024-03-22 RX ADMIN — ACETAMINOPHEN 975 MG: 325 TABLET ORAL at 01:23

## 2024-03-22 RX ADMIN — MUPIROCIN: 20 OINTMENT TOPICAL at 08:13

## 2024-03-22 RX ADMIN — ACETAMINOPHEN 975 MG: 325 TABLET ORAL at 08:18

## 2024-03-22 RX ADMIN — Medication 250 MG: at 08:13

## 2024-03-22 RX ADMIN — NIFEDIPINE 30 MG: 30 TABLET, EXTENDED RELEASE ORAL at 08:13

## 2024-03-22 RX ADMIN — SEVELAMER HYDROCHLORIDE 800 MG: 800 TABLET, FILM COATED ORAL at 08:13

## 2024-03-22 RX ADMIN — FAMOTIDINE 10 MG: 20 TABLET, FILM COATED ORAL at 08:12

## 2024-03-22 RX ADMIN — HEPARIN SODIUM 5000 UNITS: 5000 INJECTION INTRAVENOUS; SUBCUTANEOUS at 01:23

## 2024-03-22 RX ADMIN — CEFAZOLIN SODIUM 2000 MG: 2 SOLUTION INTRAVENOUS at 10:40

## 2024-03-22 RX ADMIN — GABAPENTIN 300 MG: 300 CAPSULE ORAL at 08:12

## 2024-03-22 RX ADMIN — SEVELAMER HYDROCHLORIDE 800 MG: 800 TABLET, FILM COATED ORAL at 12:11

## 2024-03-22 NOTE — PROGRESS NOTES
"Progress Note - General Surgery   Naresh Carpio 63 y.o. male MRN: [id number]  Unit/Bed#: [address] [place] [address] Encounter: [id number]    Assessment:  1) type II diabetic insulin-dependent with nephropathy, end-stage renal disease, dialysis weekly presenting to the acute care surgery team with right-sided facial cellulitis of the right cheek -AVSS, ultrasound reviewed and no overt collection but phlegmonous change with induration and erythema noted, small amount of seropurulent type secretion from the center of edema without any fluctuance noted, I&D/wound exploration offered but patient is still quite nervous about this and does not want to proceed unless he worsens off antibiotics    Plan:  1)   -Will forego incision and drainage/wound exploration  -Complete antibiotics  -Return and repeat CT scan of face and neck with IV contrast if patient after completing antibiotics has any worsening  -Dialysis and medications per nephrology and internal medicine team  -Sliding scale insulin  -Patient education  -Diet as tolerated    Subjective/Objective   Chief Complaint: I do not know what to do, I do not think I want to have incision    Subjective: Patient was seen examined at bedside.  Patient denies any acute events overnight.  Patient denies any fevers or chills.  Patient has had some seropurulent type secretions from the middle of his right-sided cheek.  Patient still has some redness and swelling.  Patient makes it apparent that he is still quite nervous about the idea of having any sort of incision and drainage made.  Patient would like to ideally/ultimately forego any sort of incision and drainage now.  Patient reports he would like to just try antibiotics and see if it gets any worse.    Objective:     Blood pressure 165/74, pulse 75, temperature 97.8 °F (36.6 °C), temperature source Oral, resp. rate 18, height 5' 8\" (1.727 m), weight 105 kg (232 lb 9.4 oz), SpO2 97%.,Body mass index is 35.36 kg/m².      Intake/Output " "Summary (Last 24 hours) at 3/22/2024 1029  Last data filed at 3/22/2024 0855  Gross per 24 hour   Intake 480 ml   Output 2500 ml   Net -2020 ml       Invasive Devices       Peripheral Intravenous Line  Duration             Peripheral IV 03/14/24 Left;Ventral (anterior) Forearm 7 days              Hemodialysis Catheter  Duration             HD Permanent Double Catheter 205 days                    Physical Exam: /74 (BP Location: Right arm)   Pulse 75   Temp 97.8 °F (36.6 °C) (Oral)   Resp 18   Ht 5' 8\" (1.727 m)   Wt 105 kg (232 lb 9.4 oz)   SpO2 97%   BMI 35.36 kg/m²   General appearance: alert and oriented, in no acute distress  Head: Normocephalic, without obvious abnormality, facial swelling with erythema on right cheek and reduced tenderness  Skin:  right cheek has erythema, induration, and small area of epidermis skin breakdown with seropurulent drainage expressed    Lab, Imaging and other studies:I have personally reviewed pertinent lab results.  ,   VTE Pharmacologic Prophylaxis: Heparin  VTE Mechanical Prophylaxis: sequential compression device  "

## 2024-03-22 NOTE — ASSESSMENT & PLAN NOTE
Historical diagnosis  Per podiatry patient has preulcerative calluses on bilateral feet which were reduced  Podiatry recommended custom molded foot and ankle orthotic and inlays which patient stated he would consider getting his outpatient.  Outpatient follow-up with primary podiatrist

## 2024-03-22 NOTE — PLAN OF CARE
Problem: Potential for Falls  Goal: Patient will remain free of falls  Description: INTERVENTIONS:  - Educate patient/family on patient safety including physical limitations  - Instruct patient to call for assistance with activity   - Consult OT/PT to assist with strengthening/mobility   - Keep Call bell within reach  - Keep bed low and locked with side rails adjusted as appropriate  - Keep care items and personal belongings within reach  - Initiate and maintain comfort rounds  - Make Fall Risk Sign visible to staff  - Offer Toileting every 2 Hours, in advance of need  - Initiate/Maintain bed/chair alarm  - O  - Apply yellow socks and bracelet for high fall risk patients  - Consider moving patient to room near nurses station  Outcome: Progressing     Problem: METABOLIC, FLUID AND ELECTROLYTES - ADULT  Goal: Electrolytes maintained within normal limits  Description: INTERVENTIONS:  - Monitor labs and assess patient for signs and symptoms of electrolyte imbalances  - Administer electrolyte replacement as ordered  - Monitor response to electrolyte replacements, including repeat lab results as appropriate  - Instruct patient on fluid and nutrition as appropriate  Outcome: Progressing  Goal: Fluid balance maintained  Description: INTERVENTIONS:  - Monitor labs   - Monitor I/O and WT  - Instruct patient on fluid and nutrition as appropriate  - Assess for signs & symptoms of volume excess or deficit  Outcome: Progressing  Goal: Glucose maintained within target range  Description: INTERVENTIONS:  - Monitor Blood Glucose as ordered  - Assess for signs and symptoms of hyperglycemia and hypoglycemia  - Administer ordered medications to maintain glucose within target range  - Assess nutritional intake and initiate nutrition service referral as needed  Outcome: Progressing     Problem: PAIN - ADULT  Goal: Verbalizes/displays adequate comfort level or baseline comfort level  Description: Interventions:  - Encourage patient to  monitor pain and request assistance  - Assess pain using appropriate pain scale  - Administer analgesics based on type and severity of pain and evaluate response  - Implement non-pharmacological measures as appropriate and evaluate response  - Consider cultural and social influences on pain and pain management  - Notify physician/advanced practitioner if interventions unsuccessful or patient reports new pain  Outcome: Progressing     Problem: INFECTION - ADULT  Goal: Absence or prevention of progression during hospitalization  Description: INTERVENTIONS:  - Assess and monitor for signs and symptoms of infection  - Monitor lab/diagnostic results  - Monitor all insertion sites, i.e. indwelling lines, tubes, and drains  - Monitor endotracheal if appropriate and nasal secretions for changes in amount and color  - [place] appropriate cooling/warming therapies per order  - Administer medications as ordered  - Instruct and encourage patient and family to use good hand hygiene technique  - Identify and instruct in appropriate isolation precautions for identified infection/condition  Outcome: Progressing     Problem: SAFETY ADULT  Goal: Patient will remain free of falls  Description: INTERVENTIONS:  - Educate patient/family on patient safety including physical limitations  - Instruct patient to call for assistance with activity   - Consult OT/PT to assist with strengthening/mobility   - Keep Call bell within reach  - Keep bed low and locked with side rails adjusted as appropriate  - Keep care items and personal belongings within reach  - Initiate and maintain comfort rounds  - Make Fall Risk Sign visible to staff  - Offer Toileting every 2 Hours, in advance of need  - Initiate/Maintain bed/chair alarm  - O  - Apply yellow socks and bracelet for high fall risk patients  - Consider moving patient to room near nurses station  Outcome: Progressing     Problem: DISCHARGE PLANNING  Goal: Discharge to home or other facility with  appropriate resources  Description: INTERVENTIONS:  - Identify barriers to discharge w/patient and caregiver  - Arrange for needed discharge resources and transportation as appropriate  - Identify discharge learning needs (meds, wound care, etc.)  - Arrange for interpretive services to assist at discharge as needed  - Refer to Case Management Department for coordinating discharge planning if the patient needs post-hospital services based on physician/advanced practitioner order or complex needs related to functional status, cognitive ability, or social support system  Outcome: Progressing     Problem: Knowledge Deficit  Goal: Patient/family/caregiver demonstrates understanding of disease process, treatment plan, medications, and discharge instructions  Description: Complete learning assessment and assess knowledge base.  Interventions:  - Provide teaching at level of understanding  - Provide teaching via preferred learning methods  Outcome: Progressing

## 2024-03-22 NOTE — ASSESSMENT & PLAN NOTE
Lab Results   Component Value Date    HGBA1C 5.4 03/12/2024     Patient reports he is no longer on insulin or any other medications at home  carb controlled diet

## 2024-03-22 NOTE — NURSING NOTE
Pt going home with family.  All discharge paperwork reviewed. No questions. IV removed.  Family here for pt. Wheeled to lobby

## 2024-03-22 NOTE — ASSESSMENT & PLAN NOTE
Patient reported some difficulty with his speech and intermittent tremors of his hands and legs which started 3/18  Unclear if this could be related to Flagyl/cefazolin use as patient was getting high doses of cefazolin  CT head negative for acute pathology  MRI was ordered but patient adamant that he does not want MRI done even if premedicated.  He understands that his symptoms could be due to a stroke and a CT scan can miss a stroke but he is not interested.  This was discussed with patient multiple times including today  Tremors have now resolved and states that he only has some mild difficulty with the speech now and notices an improvement

## 2024-03-22 NOTE — PROGRESS NOTES
"Otolaryngology HN/FPRS Progress Note:    Subjective: Pt  with facial cellulitis with edema vs phlegmon. Improved radiologically on US yesterday. Doing well. No acute events overnight.     Objective:   /74 (BP Location: Right arm)   Pulse 75   Temp 97.8 °F (36.6 °C) (Oral)   Resp 18   Ht 5' 8\" (1.727 m)   Wt 105 kg (232 lb 9.4 oz)   SpO2 97%   BMI 35.36 kg/m²     Physical Exam   Gen: NAD, A/O x 3.  Neuro: CN 2-12 intact except as below  Lungs: Breathing easily. No stertor or stridor  CV: RRR. Good distal perfusion  Neck: Soft and flat  Abd: Soft NTND    Face: Moderate right facial swelling. No facial weakness.     Assessment/Plan:  Facial cellulitis without obvious abscess on US or CT scan - Doing well.   1. Continue ancef per primary team.   2. Follow up with PCP 1-2 weeks after discharge. Does not need ENT follow up unless worsening after discharge.     Dispo: Per primary team. If there is any further need for [facility]/ENT services while in house please reconsult our office.       "

## 2024-03-22 NOTE — ASSESSMENT & PLAN NOTE
Lab Results   Component Value Date    EGFR 7 03/20/2024    EGFR 6 03/18/2024    EGFR 6 03/16/2024    CREATININE 6.78 (H) 03/20/2024    CREATININE 7.74 (H) 03/18/2024    CREATININE 8.20 (H) 03/16/2024     Continue home sevelamer.  Gets HD on TTS.  Continue outpatient follow-up with nephrology

## 2024-03-22 NOTE — PROGRESS NOTES
NEPHROLOGY PROGRESS NOTE   Naresh Carpio 63 y.o. male MRN: [id number]  Unit/Bed#: [address] [place] [address] Encounter: [id number]  Reason for Consult:     ASSESSMENT AND PLAN:  62 yo man with PMH day of ESRD on HD TTS p/w facial cellulitis.  Nephrology is consulted for management of ESRD     PLAN:     #ESRD on HD TTS:  Dialysis unit/days: [facility]  Access: Right IJ PermCath  Had dialysis yesterday, well-tolerated. UF 2.4L  Fluid restriction 1-1.5L/d  Adjust medications to GFR<10  Avoid opioids    patient will continue dialysis at outpatient unit      #Volume status/hypertension:  Volume: Euvolemic  Blood pressure: Hypertensive, /76, goal less than 140/90    Low-sodium diet  New  kg  Continue with nifedipine 30 mg twice daily  Monitor BP     #Secondary Hyperparasitoidism   Continue sevelamer with meals  Continue with low phosphorus diet     # Anemia of Kidney Disease  Current hemoglobin: 10.3 mg/dL  Treatment:  On Mircera as an outpatient  No indication of Epogen at this time  Transfuse for hemoglobin less than 7.0 per primary service       # Facial cellulitis  Antibiotics as per primary team  Cefazolin 3 g on dialysis tomorrow            SUBJECTIVE:  Patient seen and examined at bedside. No chest pain, shortness of breath, nausea, vomiting, abdominal pain or diarrhea.     OBJECTIVE:  Current Weight: Weight - Scale: 105 kg (232 lb 9.4 oz)  Vitals:    03/21/24 2300   BP: (!) 175/76   Pulse: 73   Resp: 18   Temp: (!) 97.4 °F (36.3 °C)   SpO2: 95%       Intake/Output Summary (Last 24 hours) at 3/22/2024 0655  Last data filed at 3/21/2024 1110  Gross per 24 hour   Intake 500 ml   Output 2500 ml   Net -2000 ml     Wt Readings from Last 3 Encounters:   03/20/24 105 kg (232 lb 9.4 oz)   01/24/24 109 kg (240 lb)   09/13/23 107 kg (235 lb)     Temp Readings from Last 3 Encounters:   03/21/24 (!) 97.4 °F (36.3 °C) (Oral)   01/24/24 98 °F (36.7 °C) (Oral)   09/06/23 97.7 °F (36.5 °C) (Oral)     BP Readings from Last 3  Encounters:   03/21/24 (!) 175/76   01/24/24 (!) 194/84   09/13/23 136/76     Pulse Readings from Last 3 Encounters:   03/21/24 73   01/24/24 82   09/13/23 64        General:  no acute distress at this time  Skin:  No acute rash  Eyes:  No scleral icterus and noninjected  ENT: Right face erythema  Neck:  no carotid bruits  Chest:  Clear to auscultation percussion, good respiratory effort, no use of accessory respiratory muscles  CVS:  Regular rate and rhythm without rub   Abdomen:  soft and nontender   Extremities: no significant lower extremity edema  Neuro:  No gross focality  Psych:  Alert , cooperative   Dialysis access: Right IJ PermCath      Medications:    Current Facility-Administered Medications:     acetaminophen (TYLENOL) tablet 975 mg, 975 mg, Oral, Q8H [facility], GRANT Rich, 975 mg at 03/22/24 0123    atorvastatin (LIPITOR) tablet 80 mg, 80 mg, Oral, Daily With Dinner, Angie Bernard MD, 80 mg at 03/21/24 1647    ceFAZolin (ANCEF) IVPB (premix in dextrose) 2,000 mg 50 mL, 2,000 mg, Intravenous, Q24H, Lore Silver DO, Last Rate: 100 mL/hr at 03/21/24 1106, 2,000 mg at 03/21/24 1106    docusate sodium (COLACE) capsule 100 mg, 100 mg, Oral, BID, Angie Bernard MD, 100 mg at 03/21/24 1710    famotidine (PEPCID) tablet 10 mg, 10 mg, Oral, Daily, Angie Bernard MD, 10 mg at 03/21/24 1213    gabapentin (NEURONTIN) capsule 300 mg, 300 mg, Oral, TID, Angie Bernard MD, 300 mg at 03/21/24 2102    heparin (porcine) subcutaneous injection 5,000 Units, 5,000 Units, Subcutaneous, Q8H [facility], Angie Bernard MD, 5,000 Units at 03/22/24 0123    insulin glargine (LANTUS) subcutaneous injection 10 Units 0.1 mL, 10 Units, Subcutaneous, HS, Lore Silver DO, 10 Units at 03/21/24 2104    insulin lispro (HumALOG/ADMELOG) 100 units/mL subcutaneous injection 1-5 Units, 1-5 Units, Subcutaneous, TID AC **AND** Fingerstick Glucose (POCT), , , TID AC, Lore Silver, DO    lidocaine (PF) (XYLOCAINE-MPF) 1 % injection 10  mL, 10 mL, Infiltration, Once, Isaiah Abarca PA-C    mupirocin (BACTROBAN) 2 % ointment, , Topical, TID, Angie Bernard MD, Given at 03/21/24 2103    NIFEdipine (PROCARDIA XL) 24 hr tablet 30 mg, 30 mg, Oral, 2 times per day on Sunday Monday Wednesday Friday, Kalli Mcbride PA-C, 30 mg at 03/20/24 1711    NIFEdipine (PROCARDIA XL) 24 hr tablet 30 mg, 30 mg, Oral, Once per day on Tuesday Thursday Saturday, Kalli Mcbride PA-C, 30 mg at 03/21/24 2102    ondansetron (ZOFRAN) injection 4 mg, 4 mg, Intravenous, Q6H PRN, Angie Bernard MD    polyethylene glycol (MIRALAX) packet 17 g, 17 g, Oral, Daily, Angie Bernard MD, 17 g at 03/19/24 0824    saccharomyces boulardii (FLORASTOR) capsule 250 mg, 250 mg, Oral, BID, Angie Bernard MD, 250 mg at 03/21/24 1710    sevelamer (RENAGEL) tablet 800 mg, 800 mg, Oral, TID With Meals, Angie Bernard MD, 800 mg at 03/21/24 1647    Laboratory Results:  Results from last 7 days   Lab Units 03/20/24  0530 03/19/24  0509 03/18/24  0437 03/17/24  0613 03/16/24  0541   WBC Thousand/uL 7.76 8.86 9.88 10.20* 9.94   HEMOGLOBIN g/dL 10.3* 10.7* 10.8* 11.1* 10.8*   HEMATOCRIT % 32.2* 33.1* 34.1* 35.5* 34.3*   PLATELETS Thousands/uL 259 270 276 270 251   SODIUM mmol/L 135  --  132*  --  133*   POTASSIUM mmol/L 4.4  --  4.4  --  4.6   CHLORIDE mmol/L 100  --  96  --  97   CO2 mmol/L 26  --  25  --  23   BUN mg/dL 37*  --  47*  --  58*   CREATININE mg/dL 6.78*  --  7.74*  --  8.20*   CALCIUM mg/dL 8.0*  --  7.8*  --  7.9*       US head neck soft tissue   Final Result by Marco Antonio Enrique MD (03/21 9702)      Improving cellulitis/phlegmon.         Workstation performed: [id number]         CT head wo contrast   Final Result by Nolan Estrada MD (03/19 9911)      No acute intracranial abnormality.      Mild chronic microangiopathic ischemic changes.Chronic lacunar infarcts versus dilated perivascular spaces within the bilateral sublentiform regions.      Right premaxillary and  "perimandibular cellulitis, partially imaged, with no no suspicious drainable collection, however, evaluation is limited due to lack of IV contrast.                              Workstation performed: [id number]         US head neck soft tissue   Final Result by Myra Chowdary MD (03/18 1335)      Redemonstrated findings of cellulitis of the right cheek with underlying soft tissue edema and without focal drainable fluid collection identified. Enlarging, somewhat focal area of edema measuring up to 4 cm, previously 2 cm, possibly developing    collection.      The study was marked in EPIC for significant notification.         Workstation performed: [id number]         US head neck soft tissue   Final Result by Milan Posada MD (03/15 0821)      Cellulitis in the region of the right cheek without drainable organized fluid collection. However, there is a somewhat more focal area of subcutaneous fluid measuring approximately 2 cm, which could represent a developing collection.         Workstation performed: [id number]         CT facial bones w contrast   Final Result by Santosh Shah MD (03/12 1033)      1.  Extensive superficial and deep soft tissue cellulitis involving multiple spaces in the right greater than left face and neck extending inferiorly to the level of thyroid cartilage as described. The caudal portion of the inflammatory change is not    completely imaged. Visualized airways are patent. No abscess.   2.  Artifact from prior dental fillings prevents reliable evaluation for dental caries. There is no periapical disease.   3.  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         Workstation performed: [id number]             Portions of the record may have been created with voice recognition software. Occasional wrong word or \"sound a like\" substitutions may have occurred due to the inherent limitations of voice recognition software. Read the chart carefully " and recognize, using context, where substitutions have occurred.

## 2024-03-22 NOTE — PLAN OF CARE
Problem: Potential for Falls  Goal: Patient will remain free of falls  Description: INTERVENTIONS:  - Educate patient/family on patient safety including physical limitations  - Instruct patient to call for assistance with activity   - Consult OT/PT to assist with strengthening/mobility   - Keep Call bell within reach  - Keep bed low and locked with side rails adjusted as appropriate  - Keep care items and personal belongings within reach  - Initiate and maintain comfort rounds  - Make Fall Risk Sign visible to staff  - Offer Toileting every 2 Hours, in advance of need  - Initiate/Maintain bed/chair alarm  - O  - Apply yellow socks and bracelet for high fall risk patients  - Consider moving patient to room near nurses station  3/22/2024 1529 by Julissa Campos RN  Outcome: Adequate for Discharge  3/22/2024 0817 by Julissa Campos RN  Outcome: Progressing     Problem: METABOLIC, FLUID AND ELECTROLYTES - ADULT  Goal: Electrolytes maintained within normal limits  Description: INTERVENTIONS:  - Monitor labs and assess patient for signs and symptoms of electrolyte imbalances  - Administer electrolyte replacement as ordered  - Monitor response to electrolyte replacements, including repeat lab results as appropriate  - Instruct patient on fluid and nutrition as appropriate  3/22/2024 1529 by Julissa Campos RN  Outcome: Adequate for Discharge  3/22/2024 0817 by Julissa Campos RN  Outcome: Progressing  Goal: Fluid balance maintained  Description: INTERVENTIONS:  - Monitor labs   - Monitor I/O and WT  - Instruct patient on fluid and nutrition as appropriate  - Assess for signs & symptoms of volume excess or deficit  3/22/2024 1529 by Julissa Campos RN  Outcome: Adequate for Discharge  3/22/2024 0817 by Julissa Campos RN  Outcome: Progressing  Goal: Glucose maintained within target range  Description: INTERVENTIONS:  - Monitor Blood Glucose as ordered  - Assess for signs and symptoms of hyperglycemia and hypoglycemia  -  Administer ordered medications to maintain glucose within target range  - Assess nutritional intake and initiate nutrition service referral as needed  3/22/2024 1529 by Julissa Campos RN  Outcome: Adequate for Discharge  3/22/2024 0817 by Julissa Campos RN  Outcome: Progressing     Problem: PAIN - ADULT  Goal: Verbalizes/displays adequate comfort level or baseline comfort level  Description: Interventions:  - Encourage patient to monitor pain and request assistance  - Assess pain using appropriate pain scale  - Administer analgesics based on type and severity of pain and evaluate response  - Implement non-pharmacological measures as appropriate and evaluate response  - Consider cultural and social influences on pain and pain management  - Notify physician/advanced practitioner if interventions unsuccessful or patient reports new pain  3/22/2024 1529 by Julissa Campos RN  Outcome: Adequate for Discharge  3/22/2024 0817 by Julissa Campos RN  Outcome: Progressing     Problem: INFECTION - ADULT  Goal: Absence or prevention of progression during hospitalization  Description: INTERVENTIONS:  - Assess and monitor for signs and symptoms of infection  - Monitor lab/diagnostic results  - Monitor all insertion sites, i.e. indwelling lines, tubes, and drains  - Monitor endotracheal if appropriate and nasal secretions for changes in amount and color  - Barnard appropriate cooling/warming therapies per order  - Administer medications as ordered  - Instruct and encourage patient and family to use good hand hygiene technique  - Identify and instruct in appropriate isolation precautions for identified infection/condition  3/22/2024 1529 by Julissa Campos RN  Outcome: Adequate for Discharge  3/22/2024 0817 by Julissa Campos RN  Outcome: Progressing     Problem: SAFETY ADULT  Goal: Patient will remain free of falls  Description: INTERVENTIONS:  - Educate patient/family on patient safety including physical limitations  - Instruct  patient to call for assistance with activity   - Consult OT/PT to assist with strengthening/mobility   - Keep Call bell within reach  - Keep bed low and locked with side rails adjusted as appropriate  - Keep care items and personal belongings within reach  - Initiate and maintain comfort rounds  - Make Fall Risk Sign visible to staff  - Offer Toileting every 2 Hours, in advance of need  - Initiate/Maintain bed/chair alarm  - O  - Apply yellow socks and bracelet for high fall risk patients  - Consider moving patient to room near nurses station  3/22/2024 1529 by Julissa Campos, CURLY  Outcome: Adequate for Discharge  3/22/2024 0817 by Julissa Campos RN  Outcome: Progressing     Problem: DISCHARGE PLANNING  Goal: Discharge to home or other facility with appropriate resources  Description: INTERVENTIONS:  - Identify barriers to discharge w/patient and caregiver  - Arrange for needed discharge resources and transportation as appropriate  - Identify discharge learning needs (meds, wound care, etc.)  - Arrange for interpretive services to assist at discharge as needed  - Refer to Case Management Department for coordinating discharge planning if the patient needs post-hospital services based on physician/advanced practitioner order or complex needs related to functional status, cognitive ability, or social support system  3/22/2024 1529 by Julissa Campos, CURLY  Outcome: Adequate for Discharge  3/22/2024 0817 by Julissa Campos RN  Outcome: Progressing     Problem: Knowledge Deficit  Goal: Patient/family/caregiver demonstrates understanding of disease process, treatment plan, medications, and discharge instructions  Description: Complete learning assessment and assess knowledge base.  Interventions:  - Provide teaching at level of understanding  - Provide teaching via preferred learning methods  3/22/2024 1529 by Julissa Campos, CURLY  Outcome: Adequate for Discharge  3/22/2024 0817 by Julissa Campos, CURLY  Outcome: Progressing

## 2024-03-22 NOTE — ASSESSMENT & PLAN NOTE
Patient presented with acute onset right facial pain, with associated redness, swelling    CT face: Extensive superficial and deep soft tissue cellulitis involving multiple spaces in the right greater than left face and neck extending inferiorly to the level of thyroid cartilage as described. The caudal portion of the inflammatory change is not completely imaged. Visualized airways are patent. No abscess. 2.  Artifact from prior dental fillings prevents reliable evaluation for dental caries. There is no periapical disease. 3.  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  ED: Vancomycin/Rocephin  ABX: Initially on vancomycin/cefepime/Metronizadole which was discontinued and currently on cefazolin.  Dose adjusted as patient is dialysis dependent  Wcx: MSSA  Initial U/S face: Cellulitis in the region of the right cheek without drainable organized fluid collection. focal area of subcutaneous fluid measuring 2 cm, which could represent a developing collection was noted  (3/18)repeat U/S: Showed cellulitis of the right cheek with underlying soft tissue edema but no fluid collection.  Enlarging, somewhat focal area of edema measuring up to 4 cm, previously 2 cm, possibly developing collection.  Repeat ultrasound 3/21 -skin thickening and focal edema/phlegmon which has improved  Per ENT previously, Warm compress as tolerated.  Reevaluated by ENT again today and no further intervention recommended  Apply bactroban ointment to right cheek lesion BID  Patient was seen by general surgery with plan to do bedside I&D however when surgery was about to start the procedure, patient changed his mind and does not want any further intervention  Curbside ID recs d/w :   Patient to get cefazolin on Saturday 3/23 with dialysis 3 g to complete course

## 2024-03-22 NOTE — DISCHARGE SUMMARY
[facility]  Discharge- Naresh Carpio 1960, 63 y.o. male MRN: [id number]  Unit/Bed#: [address] [place] [address] Encounter: [id number]  Primary Care Provider: Cathy Schultz MD   Date and time admitted to hospital: 3/12/2024  9:20 AM    * Facial cellulitis  Assessment & Plan  Patient presented with acute onset right facial pain, with associated redness, swelling    CT face: Extensive superficial and deep soft tissue cellulitis involving multiple spaces in the right greater than left face and neck extending inferiorly to the level of thyroid cartilage as described. The caudal portion of the inflammatory change is not completely imaged. Visualized airways are patent. No abscess. 2.  Artifact from prior dental fillings prevents reliable evaluation for dental caries. There is no periapical disease. 3.  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  ED: Vancomycin/Rocephin  ABX: Initially on vancomycin/cefepime/Metronizadole which was discontinued and currently on cefazolin.  Dose adjusted as patient is dialysis dependent  Wcx: MSSA  Initial U/S face: Cellulitis in the region of the right cheek without drainable organized fluid collection. focal area of subcutaneous fluid measuring 2 cm, which could represent a developing collection was noted  (3/18)repeat U/S: Showed cellulitis of the right cheek with underlying soft tissue edema but no fluid collection.  Enlarging, somewhat focal area of edema measuring up to 4 cm, previously 2 cm, possibly developing collection.  Repeat ultrasound 3/21 -skin thickening and focal edema/phlegmon which has improved  Per ENT previously, Warm compress as tolerated.  Reevaluated by ENT again today and no further intervention recommended  Apply bactroban ointment to right cheek lesion BID  Patient was seen by general surgery with plan to do bedside I&D however when surgery was about to start the procedure, patient changed  his mind and does not want any further intervention  Sophia LITTLEJOHN recs d/w :   Patient to get cefazolin on Saturday 3/23 with dialysis 3 g to complete course            Difficulty with speech  Assessment & Plan  Patient reported some difficulty with his speech and intermittent tremors of his hands and legs which started 3/18  Unclear if this could be related to Flagyl/cefazolin use as patient was getting high doses of cefazolin  CT head negative for acute pathology  MRI was ordered but patient adamant that he does not want MRI done even if premedicated.  He understands that his symptoms could be due to a stroke and a CT scan can miss a stroke but he is not interested.  This was discussed with patient multiple times including today  Tremors have now resolved and states that he only has some mild difficulty with the speech now and notices an improvement    ESRD (end stage renal disease) ([facility])  Assessment & Plan  Lab Results   Component Value Date    EGFR 7 03/20/2024    EGFR 6 03/18/2024    EGFR 6 03/16/2024    CREATININE 6.78 (H) 03/20/2024    CREATININE 7.74 (H) 03/18/2024    CREATININE 8.20 (H) 03/16/2024     Continue home sevelamer.  Gets HD on TTS.  Continue outpatient follow-up with nephrology    Type 2 diabetes mellitus, without long-term current use of insulin ([facility])  Assessment & Plan  Lab Results   Component Value Date    HGBA1C 5.4 03/12/2024     Patient reports he is no longer on insulin or any other medications at home  carb controlled diet    Essential hypertension  Assessment & Plan  Continue nifedipine.    Charcot's joint  Assessment & Plan  Historical diagnosis  Per podiatry patient has preulcerative calluses on bilateral feet which were reduced  Podiatry recommended custom molded foot and ankle orthotic and inlays which patient stated he would consider getting his outpatient.  Outpatient follow-up with primary podiatrist    Electrolyte abnormality-resolved as of 3/20/2024  Assessment & Plan  NA-130  >135  K-5.4 >wnl  Cl-94>wnl      Medical Problems       Resolved Problems  Date Reviewed: 3/22/2024            Resolved    Electrolyte abnormality 3/20/2024     Resolved by  Lore Silver DO        Discharging Physician / Practitioner: Lore Silver DO  PCP: Cathy Schultz MD  Admission Date:   Admission Orders (From admission, onward)       Ordered        03/12/24 1101  INPATIENT ADMISSION  Once                          Discharge Date: 03/22/24    Consultations During Hospital Stay:  Urology  Podiatry  Dental surgery  ENT    Procedures Performed:   None    Significant Findings / Test Results:   See above    Incidental Findings:   Elongated bilateral styloid processes/calcified stylohyoid ligaments  I reviewed the above mentioned incidental findings with the patient and/or family and they expressed understanding.    Test Results Pending at Discharge (will require follow up):   none     Outpatient Tests Requested:  none    Complications:  none    Reason for Admission: Right facial cellulitis    Hospital Course:   Naresh Carpio is a 63 y.o. male patient who originally presented to the hospital on 3/12/2024 due to Swelling, redness of his right cheek and neck after he popped pimples on his face.  No blurry vision.  Patient admitted with right-sided facial cellulitis but imaging was negative for abscess.  He was seen by ENT initially while here.  Treated with IV antibiotics and antibiotic was later adjusted.  Patient underwent multiple ultrasounds which does show improvement.  Later in the course patient was also seen by surgery as he was requesting to see if someone could drain the cheek but then change his mind.  Patient completing his course of IV antibiotic tomorrow after dialysis.  Patient was also seen by nephrology and underwent dialysis per nephrology.  Cleared by all consultants involved in his care prior to discharge.  Discharge plan discussed in details with patient.  Patient's medications were  "reconciled with him prior to discharge    Please see above list of diagnoses and related plan for additional information.     Condition at Discharge: stable    Discharge Day Visit / Exam:   Subjective: Denies any further tremors.  States speech has significantly improved and now only having very mild intermittent difficulty with it.  Denies any pain of the right cheek.  Discuss option of MRI but patient refuses and says that he knows he has not had a stroke    Vitals: Blood Pressure: 165/74 (03/22/24 0818)  Pulse: 75 (03/22/24 0818)  Temperature: 97.8 °F (36.6 °C) (03/22/24 0818)  Temp Source: Oral (03/22/24 0818)  Respirations: 18 (03/22/24 0818)  Height: 5' 8\" (172.7 cm) (03/19/24 1052)  Weight - Scale: 105 kg (232 lb 9.4 oz) (03/20/24 0100)  SpO2: 97 % (03/22/24 0818)  Exam:   Physical Exam  Vitals reviewed.   Constitutional:       General: He is not in acute distress.     Appearance: He is not toxic-appearing.   HENT:      Head: Normocephalic.      Comments: Right cheek with improved swelling, erythema.  Induration noted but no obvious fluctuance.  No tenderness  Eyes:      General:         Right eye: No discharge.         Left eye: No discharge.   Cardiovascular:      Rate and Rhythm: Normal rate and regular rhythm.   Pulmonary:      Effort: Pulmonary effort is normal. No respiratory distress.      Breath sounds: Normal breath sounds. No wheezing or rales.   Abdominal:      General: Bowel sounds are normal. There is no distension.      Palpations: Abdomen is soft.      Tenderness: There is no abdominal tenderness.   Neurological:      Mental Status: He is alert and oriented to person, place, and time.      Comments: Very mild intermittent dysarthria noted          Discussion with Family: Patient declined call to .     Discharge instructions/Information to patient and family:   See after visit summary for information provided to patient and family.      Provisions for Follow-Up Care:  See after " visit summary for information related to follow-up care and any pertinent home health orders.      Mobility at time of Discharge:   Basic Mobility Inpatient Raw Score: 24  JH-HLM Goal: 8: Walk 250 feet or more  JH-HLM Achieved: 8: Walk 250 feet ot more  HLM Goal achieved. Continue to encourage appropriate mobility.     Disposition:   Home    Planned Readmission: no     Discharge Statement:  I spent > 30 minutes discharging the patient. This time was spent on the day of discharge. I had direct contact with the patient on the day of discharge. Greater than 50% of the total time was spent examining patient, answering all patient questions, arranging and discussing plan of care with patient as well as directly providing post-discharge instructions.  Additional time then spent on discharge activities.    Discharge Medications:  See after visit summary for reconciled discharge medications provided to patient and/or family.      **Please Note: This note may have been constructed using a voice recognition system**

## 2024-03-22 NOTE — DISCHARGE INSTR - AVS FIRST PAGE
Podiatry recommended custom molded foot and ankle orthotic and inlays which you should consider getting after discharge    On imaging you were noted to have incidental finding of elongated bilateral styloid processes/calcified stylohyoid ligaments suggesting Eagle syndrome.

## 2024-04-06 ENCOUNTER — APPOINTMENT (OUTPATIENT)
Dept: DIALYSIS | Facility: HOSPITAL | Age: 64
DRG: 640 | End: 2024-04-06
Payer: MEDICARE

## 2024-04-06 ENCOUNTER — APPOINTMENT (EMERGENCY)
Dept: RADIOLOGY | Facility: HOSPITAL | Age: 64
DRG: 640 | End: 2024-04-06
Payer: MEDICARE

## 2024-04-06 ENCOUNTER — HOSPITAL ENCOUNTER (INPATIENT)
Facility: HOSPITAL | Age: 64
LOS: 2 days | Discharge: HOME | DRG: 640 | End: 2024-04-09
Attending: EMERGENCY MEDICINE | Admitting: FAMILY MEDICINE
Payer: MEDICARE

## 2024-04-06 DIAGNOSIS — E87.5 HYPERKALEMIA: Primary | ICD-10-CM

## 2024-04-06 DIAGNOSIS — R07.89 CHEST TIGHTNESS: ICD-10-CM

## 2024-04-06 DIAGNOSIS — R09.02 HYPOXIA: ICD-10-CM

## 2024-04-06 DIAGNOSIS — B35.1 ONYCHOMYCOSIS: ICD-10-CM

## 2024-04-06 DIAGNOSIS — N18.6 ESRD (END STAGE RENAL DISEASE) (HCC): ICD-10-CM

## 2024-04-06 PROBLEM — R07.9 CHEST PAIN: Status: ACTIVE | Noted: 2024-04-06

## 2024-04-06 LAB
2HR DELTA HS TROPONIN: 2 NG/L
4HR DELTA HS TROPONIN: 0 NG/L
ALBUMIN SERPL BCP-MCNC: 3.8 G/DL (ref 3.5–5)
ALP SERPL-CCNC: 120 U/L (ref 34–104)
ALT SERPL W P-5'-P-CCNC: 9 U/L (ref 7–52)
ANION GAP SERPL CALCULATED.3IONS-SCNC: 14 MMOL/L (ref 4–13)
AST SERPL W P-5'-P-CCNC: 20 U/L (ref 13–39)
BASOPHILS # BLD AUTO: 0.06 THOUSANDS/ÂΜL (ref 0–0.1)
BASOPHILS NFR BLD AUTO: 1 % (ref 0–1)
BILIRUB SERPL-MCNC: 0.36 MG/DL (ref 0.2–1)
BUN SERPL-MCNC: 86 MG/DL (ref 5–25)
CALCIUM SERPL-MCNC: 8.2 MG/DL (ref 8.4–10.2)
CARDIAC TROPONIN I PNL SERPL HS: 16 NG/L
CARDIAC TROPONIN I PNL SERPL HS: 16 NG/L
CARDIAC TROPONIN I PNL SERPL HS: 18 NG/L
CHLORIDE SERPL-SCNC: 96 MMOL/L (ref 96–108)
CO2 SERPL-SCNC: 22 MMOL/L (ref 21–32)
CREAT SERPL-MCNC: 9.96 MG/DL (ref 0.6–1.3)
EOSINOPHIL # BLD AUTO: 0.76 THOUSAND/ÂΜL (ref 0–0.61)
EOSINOPHIL NFR BLD AUTO: 7 % (ref 0–6)
ERYTHROCYTE [DISTWIDTH] IN BLOOD BY AUTOMATED COUNT: 17.3 % (ref 11.6–15.1)
GFR SERPL CREATININE-BSD FRML MDRD: 4 ML/MIN/1.73SQ M
GLUCOSE SERPL-MCNC: 116 MG/DL (ref 65–140)
GLUCOSE SERPL-MCNC: 125 MG/DL (ref 65–140)
GLUCOSE SERPL-MCNC: 131 MG/DL (ref 65–140)
GLUCOSE SERPL-MCNC: 138 MG/DL (ref 65–140)
GLUCOSE SERPL-MCNC: 146 MG/DL (ref 65–140)
HCT VFR BLD AUTO: 32.7 % (ref 36.5–49.3)
HGB BLD-MCNC: 10.1 G/DL (ref 12–17)
IMM GRANULOCYTES # BLD AUTO: 0.09 THOUSAND/UL (ref 0–0.2)
IMM GRANULOCYTES NFR BLD AUTO: 1 % (ref 0–2)
LYMPHOCYTES # BLD AUTO: 1.21 THOUSANDS/ÂΜL (ref 0.6–4.47)
LYMPHOCYTES NFR BLD AUTO: 12 % (ref 14–44)
MAGNESIUM SERPL-MCNC: 2.1 MG/DL (ref 1.9–2.7)
MCH RBC QN AUTO: 30 PG (ref 26.8–34.3)
MCHC RBC AUTO-ENTMCNC: 30.9 G/DL (ref 31.4–37.4)
MCV RBC AUTO: 97 FL (ref 82–98)
MONOCYTES # BLD AUTO: 0.79 THOUSAND/ÂΜL (ref 0.17–1.22)
MONOCYTES NFR BLD AUTO: 8 % (ref 4–12)
NEUTROPHILS # BLD AUTO: 7.41 THOUSANDS/ÂΜL (ref 1.85–7.62)
NEUTS SEG NFR BLD AUTO: 71 % (ref 43–75)
NRBC BLD AUTO-RTO: 0 /100 WBCS
PHOSPHATE SERPL-MCNC: 5.2 MG/DL (ref 2.3–4.1)
PLATELET # BLD AUTO: 267 THOUSANDS/UL (ref 149–390)
PMV BLD AUTO: 10.7 FL (ref 8.9–12.7)
POTASSIUM SERPL-SCNC: 6.5 MMOL/L (ref 3.5–5.3)
PROT SERPL-MCNC: 7.7 G/DL (ref 6.4–8.4)
RBC # BLD AUTO: 3.37 MILLION/UL (ref 3.88–5.62)
SODIUM SERPL-SCNC: 132 MMOL/L (ref 135–147)
WBC # BLD AUTO: 10.32 THOUSAND/UL (ref 4.31–10.16)

## 2024-04-06 PROCEDURE — NC001 PR NO CHARGE: Performed by: INTERNAL MEDICINE

## 2024-04-06 PROCEDURE — 36415 COLL VENOUS BLD VENIPUNCTURE: CPT | Performed by: PHYSICIAN ASSISTANT

## 2024-04-06 PROCEDURE — 99223 1ST HOSP IP/OBS HIGH 75: CPT | Performed by: FAMILY MEDICINE

## 2024-04-06 PROCEDURE — 87081 CULTURE SCREEN ONLY: CPT | Performed by: NURSE PRACTITIONER

## 2024-04-06 PROCEDURE — 71045 X-RAY EXAM CHEST 1 VIEW: CPT

## 2024-04-06 PROCEDURE — 93005 ELECTROCARDIOGRAM TRACING: CPT

## 2024-04-06 PROCEDURE — 99214 OFFICE O/P EST MOD 30 MIN: CPT | Performed by: INTERNAL MEDICINE

## 2024-04-06 PROCEDURE — 83735 ASSAY OF MAGNESIUM: CPT | Performed by: PHYSICIAN ASSISTANT

## 2024-04-06 PROCEDURE — 85025 COMPLETE CBC W/AUTO DIFF WBC: CPT | Performed by: PHYSICIAN ASSISTANT

## 2024-04-06 PROCEDURE — 84484 ASSAY OF TROPONIN QUANT: CPT | Performed by: NURSE PRACTITIONER

## 2024-04-06 PROCEDURE — 84100 ASSAY OF PHOSPHORUS: CPT | Performed by: PHYSICIAN ASSISTANT

## 2024-04-06 PROCEDURE — 84484 ASSAY OF TROPONIN QUANT: CPT | Performed by: PHYSICIAN ASSISTANT

## 2024-04-06 PROCEDURE — 99291 CRITICAL CARE FIRST HOUR: CPT | Performed by: PHYSICIAN ASSISTANT

## 2024-04-06 PROCEDURE — G0257 UNSCHED DIALYSIS ESRD PT HOS: HCPCS

## 2024-04-06 PROCEDURE — 82948 REAGENT STRIP/BLOOD GLUCOSE: CPT

## 2024-04-06 PROCEDURE — 80053 COMPREHEN METABOLIC PANEL: CPT | Performed by: PHYSICIAN ASSISTANT

## 2024-04-06 RX ORDER — ACETAMINOPHEN 325 MG/1
650 TABLET ORAL EVERY 6 HOURS PRN
Status: DISCONTINUED | OUTPATIENT
Start: 2024-04-06 | End: 2024-04-09 | Stop reason: HOSPADM

## 2024-04-06 RX ORDER — ALLOPURINOL 100 MG/1
100 TABLET ORAL DAILY
Status: DISCONTINUED | OUTPATIENT
Start: 2024-04-06 | End: 2024-04-09 | Stop reason: HOSPADM

## 2024-04-06 RX ORDER — INSULIN LISPRO 100 [IU]/ML
1-5 INJECTION, SOLUTION INTRAVENOUS; SUBCUTANEOUS
Status: DISCONTINUED | OUTPATIENT
Start: 2024-04-06 | End: 2024-04-09 | Stop reason: HOSPADM

## 2024-04-06 RX ORDER — GABAPENTIN 100 MG/1
200 CAPSULE ORAL 2 TIMES DAILY
Status: DISCONTINUED | OUTPATIENT
Start: 2024-04-06 | End: 2024-04-09 | Stop reason: HOSPADM

## 2024-04-06 RX ORDER — INSULIN LISPRO 100 [IU]/ML
1-6 INJECTION, SOLUTION INTRAVENOUS; SUBCUTANEOUS
Status: DISCONTINUED | OUTPATIENT
Start: 2024-04-06 | End: 2024-04-09 | Stop reason: HOSPADM

## 2024-04-06 RX ORDER — SEVELAMER HYDROCHLORIDE 800 MG/1
800 TABLET, FILM COATED ORAL
Status: DISCONTINUED | OUTPATIENT
Start: 2024-04-06 | End: 2024-04-09 | Stop reason: HOSPADM

## 2024-04-06 RX ORDER — HEPARIN SODIUM 5000 [USP'U]/ML
5000 INJECTION, SOLUTION INTRAVENOUS; SUBCUTANEOUS EVERY 8 HOURS SCHEDULED
Status: DISCONTINUED | OUTPATIENT
Start: 2024-04-06 | End: 2024-04-09 | Stop reason: HOSPADM

## 2024-04-06 RX ORDER — ATORVASTATIN CALCIUM 80 MG/1
80 TABLET, FILM COATED ORAL
Status: DISCONTINUED | OUTPATIENT
Start: 2024-04-06 | End: 2024-04-09 | Stop reason: HOSPADM

## 2024-04-06 RX ORDER — NIFEDIPINE 30 MG/1
30 TABLET, EXTENDED RELEASE ORAL 3 TIMES WEEKLY
Status: DISCONTINUED | OUTPATIENT
Start: 2024-04-06 | End: 2024-04-09 | Stop reason: HOSPADM

## 2024-04-06 RX ORDER — NIFEDIPINE 30 MG/1
30 TABLET, EXTENDED RELEASE ORAL
Status: DISCONTINUED | OUTPATIENT
Start: 2024-04-07 | End: 2024-04-09 | Stop reason: HOSPADM

## 2024-04-06 RX ADMIN — ATORVASTATIN CALCIUM 80 MG: 80 TABLET, FILM COATED ORAL at 16:57

## 2024-04-06 RX ADMIN — HEPARIN SODIUM 5000 UNITS: 5000 INJECTION, SOLUTION INTRAVENOUS; SUBCUTANEOUS at 14:35

## 2024-04-06 RX ADMIN — NIFEDIPINE 30 MG: 30 TABLET, EXTENDED RELEASE ORAL at 12:07

## 2024-04-06 RX ADMIN — GABAPENTIN 200 MG: 100 CAPSULE ORAL at 18:39

## 2024-04-06 RX ADMIN — SEVELAMER HYDROCHLORIDE 800 MG: 800 TABLET, FILM COATED ORAL at 19:04

## 2024-04-06 RX ADMIN — HEPARIN SODIUM 5000 UNITS: 5000 INJECTION, SOLUTION INTRAVENOUS; SUBCUTANEOUS at 21:20

## 2024-04-06 RX ADMIN — ALLOPURINOL 100 MG: 100 TABLET ORAL at 12:07

## 2024-04-06 RX ADMIN — GABAPENTIN 200 MG: 100 CAPSULE ORAL at 12:07

## 2024-04-06 NOTE — PLAN OF CARE
TX plan reviewed with Dr DENISE Landa before the start of HDTX and he is agreeable to the following: Goal is to UD 2-3.5 L on a 2 K+ bath for a serum K+ of 6.5, 4 HR TX.     Problem: METABOLIC, FLUID AND ELECTROLYTES - ADULT  Goal: Electrolytes maintained within normal limits  Description: INTERVENTIONS:  - Monitor labs and assess patient for signs and symptoms of electrolyte imbalances  - Administer electrolyte replacement as ordered  - Monitor response to electrolyte replacements, including repeat lab results as appropriate  - Instruct patient on fluid and nutrition as appropriate  Outcome: Progressing  Goal: Fluid balance maintained  Description: INTERVENTIONS:  - Monitor labs   - Monitor I/O and WT  - Instruct patient on fluid and nutrition as appropriate  - Assess for signs & symptoms of volume excess or deficit  Outcome: Progressing     Post-Dialysis RN Treatment Note    Blood Pressure:  Pre 163/73 mm/Hg  Post 166/71 mmHg   EDW  TBD kg    Weight:  Pre 119.2 kg   Post 115.7 kg   Mode of weight measurement: Standing Scale   Volume Removed  3500 ml net    Treatment duration 240 minutes    NS given  No    Treatment shortened? No   Medications given during Rx None Reported   Estimated Kt/V  None Reported   Access type: Permacath/TDC   Access Issues: No    Report called to primary nurse   Yes,  Xochitl Ernandez, RN

## 2024-04-06 NOTE — ASSESSMENT & PLAN NOTE
Hypoxia present on admission evidenced by O2 sats 89% on room air on initial presentation to ED.  Currently is on 2 L nasal cannula with O2 sats 95%.  Crackles noted on auscultation at the bases.  Chest x-ray pending.  Suspect volume overload secondary to missed dialysis sessions.  Wean O2 as tolerated to keep O2 sats greater than 90%

## 2024-04-06 NOTE — NURSING NOTE
"Patient declined skin check of the buttocks and groin. States \"You don't need to check, there is nothing there.\"   "
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
## 2024-04-06 NOTE — ASSESSMENT & PLAN NOTE
Patient reports having chest pain starting on day previous to admission.  Describes it as a sense of fullness in his chest.  Denies any palpitations or radiation.  EKG shows sinus rhythm heart rate 78  Monitor on telemetry.  Initial troponins negative, follow-up on serial troponins.  Suspect may be secondary to volume overload as patient has missed dialysis on Thursday and is due for dialysis today.  Plan for dialysis this afternoon as per discussion with nephrology  Monitor

## 2024-04-06 NOTE — ASSESSMENT & PLAN NOTE
Patient has a history of hypertension, does take nifedipine 30 mg twice daily Monday Wednesday Friday and Sunday, and nifedipine 30 mg daily Tuesday Thursday and Saturdays.  Blood pressure currently 164/72.  Monitor

## 2024-04-06 NOTE — PLAN OF CARE
Problem: CARDIOVASCULAR - ADULT  Goal: Maintains optimal cardiac output and hemodynamic stability  Description: INTERVENTIONS:  - Monitor I/O, vital signs and rhythm  - Monitor for S/S and trends of decreased cardiac output  - Administer and titrate ordered vasoactive medications to optimize hemodynamic stability  - Assess quality of pulses, skin color and temperature  - Assess for signs of decreased coronary artery perfusion  - Instruct patient to report change in severity of symptoms  Outcome: Progressing  Goal: Absence of cardiac dysrhythmias or at baseline rhythm  Description: INTERVENTIONS:  - Continuous cardiac monitoring, vital signs, obtain 12 lead EKG if ordered  - Administer antiarrhythmic and heart rate control medications as ordered  - Monitor electrolytes and administer replacement therapy as ordered  Outcome: Progressing     Problem: RESPIRATORY - ADULT  Goal: Achieves optimal ventilation and oxygenation  Description: INTERVENTIONS:  - Assess for changes in respiratory status  - Assess for changes in mentation and behavior  - Position to facilitate oxygenation and minimize respiratory effort  - Oxygen administered by appropriate delivery if ordered  - Initiate smoking cessation education as indicated  - Encourage broncho-pulmonary hygiene including cough, deep breathe, Incentive Spirometry  - Assess the need for suctioning and aspirate as needed  - Assess and instruct to report SOB or any respiratory difficulty  - Respiratory Therapy support as indicated  Outcome: Progressing     Problem: METABOLIC, FLUID AND ELECTROLYTES - ADULT  Goal: Electrolytes maintained within normal limits  Description: INTERVENTIONS:  - Monitor labs and assess patient for signs and symptoms of electrolyte imbalances  - Administer electrolyte replacement as ordered  - Monitor response to electrolyte replacements, including repeat lab results as appropriate  - Instruct patient on fluid and nutrition as appropriate  Outcome:  Progressing  Goal: Fluid balance maintained  Description: INTERVENTIONS:  - Monitor labs   - Monitor I/O and WT  - Instruct patient on fluid and nutrition as appropriate  - Assess for signs & symptoms of volume excess or deficit  Outcome: Progressing  Goal: Glucose maintained within target range  Description: INTERVENTIONS:  - Monitor Blood Glucose as ordered  - Assess for signs and symptoms of hyperglycemia and hypoglycemia  - Administer ordered medications to maintain glucose within target range  - Assess nutritional intake and initiate nutrition service referral as needed  Outcome: Progressing     Problem: SKIN/TISSUE INTEGRITY - ADULT  Goal: Skin Integrity remains intact(Skin Breakdown Prevention)  Description: Assess:  -Perform Praful assessment every shift  -Clean and moisturize skin every shift or as needed  -Inspect skin when repositioning, toileting, and assisting with ADLS  -Assess under medical devices such as wires every 2 hr  -Assess extremities for adequate circulation and sensation     Bed Management:  -Have minimal linens on bed & keep smooth, unwrinkled  -Change linens as needed when moist or perspiring  -Avoid sitting or lying in one position for more than 2 hours while in bed  -Keep HOB at 45 degrees     Toileting:  -Offer bedside commode  -Assess for incontinence every 2 hr  -Use incontinent care products after each incontinent episode such as foam cleanser    Activity:  -Mobilize patient 3 times a day  -Encourage activity and walks on unit  -Encourage or provide ROM exercises   -Turn and reposition patient every 2 Hours  -Use appropriate equipment to lift or move patient in bed  -Instruct/ Assist with weight shifting every 60 min when out of bed in chair  -Consider limitation of chair time 2 hour intervals    Skin Care:  -Avoid use of baby powder, tape, friction and shearing, hot water or constrictive clothing  -Relieve pressure over bony prominences using waffle cushion  -Do not massage red  bony areas    Next Steps:  -Teach patient strategies to minimize risks such as weight shifting   -Consider consults to  interdisciplinary teams such as weight shifting  Outcome: Progressing     Problem: PAIN - ADULT  Goal: Verbalizes/displays adequate comfort level or baseline comfort level  Description: Interventions:  - Encourage patient to monitor pain and request assistance  - Assess pain using appropriate pain scale  - Administer analgesics based on type and severity of pain and evaluate response  - Implement non-pharmacological measures as appropriate and evaluate response  - Consider cultural and social influences on pain and pain management  - Notify physician/advanced practitioner if interventions unsuccessful or patient reports new pain  Outcome: Progressing     Problem: INFECTION - ADULT  Goal: Absence or prevention of progression during hospitalization  Description: INTERVENTIONS:  - Assess and monitor for signs and symptoms of infection  - Monitor lab/diagnostic results  - Monitor all insertion sites, i.e. indwelling lines, tubes, and drains  - Monitor endotracheal if appropriate and nasal secretions for changes in amount and color  - Center appropriate cooling/warming therapies per order  - Administer medications as ordered  - Instruct and encourage patient and family to use good hand hygiene technique  - Identify and instruct in appropriate isolation precautions for identified infection/condition  Outcome: Progressing  Goal: Absence of fever/infection during neutropenic period  Description: INTERVENTIONS:  - Monitor WBC    Outcome: Progressing     Problem: SAFETY ADULT  Goal: Patient will remain free of falls  Description: INTERVENTIONS:  - Educate patient/family on patient safety including physical limitations  - Instruct patient to call for assistance with activity   - Consult OT/PT to assist with strengthening/mobility   - Keep Call bell within reach  - Keep bed low and locked with side rails  adjusted as appropriate  - Keep care items and personal belongings within reach  - Initiate and maintain comfort rounds  - Make Fall Risk Sign visible to staff  - Offer Toileting every 2 Hours, in advance of need  - Initiate/Maintain bed alarm  - Obtain necessary fall risk management equipment: slipper socks  - Apply yellow socks and bracelet for high fall risk patients  - Consider moving patient to room near nurses station  Outcome: Progressing  Goal: Maintain or return to baseline ADL function  Description: INTERVENTIONS:  -  Assess patient's ability to carry out ADLs; assess patient's baseline for ADL function and identify physical deficits which impact ability to perform ADLs (bathing, care of mouth/teeth, toileting, grooming, dressing, etc.)  - Assess/evaluate cause of self-care deficits   - Assess range of motion  - Assess patient's mobility; develop plan if impaired  - Assess patient's need for assistive devices and provide as appropriate  - Encourage maximum independence but intervene and supervise when necessary  - Involve family in performance of ADLs  - Assess for home care needs following discharge   - Consider OT consult to assist with ADL evaluation and planning for discharge  - Provide patient education as appropriate  Outcome: Progressing  Goal: Maintains/Returns to pre admission functional level  Description: INTERVENTIONS:  - Perform AM-PAC 6 Click Basic Mobility/ Daily Activity assessment daily.  - Set and communicate daily mobility goal to care team and patient/family/caregiver.   - Collaborate with rehabilitation services on mobility goals if consulted  - Perform Range of Motion 3 times a day.  - Reposition patient every 2 hours.  - Dangle patient 3 times a day  - Stand patient 3 times a day  - Ambulate patient 3 times a day  - Out of bed to chair 3 times a day   - Out of bed for meals 3 times a day  - Out of bed for toileting  - Record patient progress and toleration of activity level    Outcome: Progressing     Problem: DISCHARGE PLANNING  Goal: Discharge to home or other facility with appropriate resources  Description: INTERVENTIONS:  - Identify barriers to discharge w/patient and caregiver  - Arrange for needed discharge resources and transportation as appropriate  - Identify discharge learning needs (meds, wound care, etc.)  - Arrange for interpretive services to assist at discharge as needed  - Refer to Case Management Department for coordinating discharge planning if the patient needs post-hospital services based on physician/advanced practitioner order or complex needs related to functional status, cognitive ability, or social support system  Outcome: Progressing     Problem: Knowledge Deficit  Goal: Patient/family/caregiver demonstrates understanding of disease process, treatment plan, medications, and discharge instructions  Description: Complete learning assessment and assess knowledge base.  Interventions:  - Provide teaching at level of understanding  - Provide teaching via preferred learning methods  Outcome: Progressing

## 2024-04-06 NOTE — ASSESSMENT & PLAN NOTE
Hypokalemia present on admission with potassium of 6.5 slightly hemolyzed.  Telemetry ordered, sinus rhythm on monitor currently  Nephrology consulted.  Plan for dialysis this afternoon.  Repeat labs in a.m.

## 2024-04-06 NOTE — ED PROVIDER NOTES
History  Chief Complaint   Patient presents with    Chest Pain     Patient reports chest tightness beginning yesterday, last dialysis Tuesday     63-year-old male, on dialysis, presenting today via EMS with chest tightness that began last evening.  Patient relays that he missed his Thursday appointment for dialysis.  Typically goes Tuesday, Thursday, Saturday.  Today is Saturday-so has missed 1 day of dialysis.  States that he is visiting his sister.  Also reports that a tree fell on his car and was unable to go to his dialysis appointment due to this.  He otherwise has been feeling very well.  Patient is visiting the area however has been seen here multiple times.  EMS gave aspirin.  Taking all of his medications as prescribed.  Patient relays that this feels similar to prior episodes where he has missed dialysis.  Has had some shortness of breath and some tightening to the bilateral lower extremities.  Chest tightness does not radiate and it is not accompanied with any other symptoms.  Denies nausea, vomiting, diaphoresis, palpitations, dizziness or lightheadedness.  Differential includes but is not limited to electrolyte abnormality, arrhythmia, pulmonary effusion, etc.        Prior to Admission Medications   Prescriptions Last Dose Informant Patient Reported? Taking?   NIFEdipine ER (ADALAT CC) 30 MG 24 hr tablet   No No   Sig: Take 30 mg twice a day on Mon, Wed, Fri, Sun and take 30 mg once daily on Tue, Thurs, Sat   acetaminophen (TYLENOL) 325 mg tablet   No No   Sig: Take 2 tablets (650 mg total) by mouth every 6 (six) hours as needed for mild pain, moderate pain, severe pain, headaches or fever   allopurinol (ZYLOPRIM) 100 mg tablet   No No   Sig: Take 1 tablet (100 mg total) by mouth daily   atorvastatin (LIPITOR) 80 mg tablet   Yes No   Sig: Take 80 mg by mouth daily   docusate sodium (COLACE) 100 mg capsule   No No   Sig: Take 1 capsule (100 mg total) by mouth 2 (two) times a day   gabapentin (NEURONTIN)  100 mg capsule   No No   Sig: Take 2 capsules (200 mg total) by mouth 2 (two) times a day   mupirocin (BACTROBAN) 2 % ointment   No No   Sig: Apply topically 3 (three) times a day   naloxone (NARCAN) 4 mg/0.1 mL nasal spray   No No   Sig: Administer 1 spray into a nostril. If no response after 2-3 minutes, give another dose in the other nostril using a new spray.   polyethylene glycol (MIRALAX) 17 g packet   No No   Sig: Take 17 g by mouth daily Do not start before September 7, 2023.   saccharomyces boulardii (FLORASTOR) 250 mg capsule   No No   Sig: Take 1 capsule (250 mg total) by mouth 2 (two) times a day   sevelamer (RENAGEL) 800 mg tablet   No No   Sig: Take 1 tablet (800 mg total) by mouth 3 (three) times a day with meals      Facility-Administered Medications: None       Past Medical History:   Diagnosis Date    CKD     Diabetes mellitus (HCC)     Hyperlipidemia     Hypertension     Left toe amputee (HCC)     TIA (transient ischemic attack)        Past Surgical History:   Procedure Laterality Date    AMPUTATION Left     left foot resection 10 years ago dr tran    IR LOWER EXTREMITY ANGIOGRAM  08/29/2023    IR TEMPORARY DIALYSIS CATHETER PLACEMENT  08/25/2023    IR TUNNELED CENTRAL LINE REMOVAL  08/23/2023    IR TUNNELED DIALYSIS CATHETER PLACEMENT  01/27/2022    IR TUNNELED DIALYSIS CATHETER PLACEMENT  08/30/2023    CA AMPUTATION METATARSAL W/TOE SINGLE Right 8/31/2023    Procedure: RIGHT PARTIAL 1ST RAY RESECTION WITH  WOUND VAC APPLICATION;  Surgeon: Won Parmar DPM;  Location: [place];  Service: Podiatry       History reviewed. No pertinent family history.  I have reviewed and agree with the history as documented.    E-Cigarette/Vaping    E-Cigarette Use Never User      E-Cigarette/Vaping Substances    Nicotine No     THC No     CBD No     Flavoring No     Other No     Unknown No      Social History     Tobacco Use    Smoking status: Never     Passive exposure: Never    Smokeless tobacco: Never    Vaping Use    Vaping status: Never Used   Substance Use Topics    Alcohol use: Not Currently     Alcohol/week: 0.0 standard drinks of alcohol     Comment: 0    Drug use: Never       Review of Systems   Constitutional: Negative.  Negative for chills, fatigue and fever.   HENT: Negative.  Negative for congestion, postnasal drip, rhinorrhea and sore throat.    Eyes: Negative.    Respiratory:  Positive for chest tightness. Negative for apnea, cough, choking, shortness of breath and wheezing.    Cardiovascular:  Positive for leg swelling.   Gastrointestinal: Negative.  Negative for abdominal pain, diarrhea, nausea and vomiting.   Endocrine: Negative.    Genitourinary: Negative.    Musculoskeletal: Negative.    Skin: Negative.    Neurological: Negative.    Hematological: Negative.    Psychiatric/Behavioral: Negative.     All other systems reviewed and are negative.      Physical Exam  Physical Exam  Vitals and nursing note reviewed.   Constitutional:       General: He is not in acute distress.     Appearance: Normal appearance. He is well-developed. He is obese. He is not diaphoretic.   HENT:      Head: Normocephalic and atraumatic.      Right Ear: External ear normal.      Left Ear: External ear normal.      Nose: Nose normal.      Mouth/Throat:      Pharynx: No oropharyngeal exudate.   Eyes:      General: No scleral icterus.        Right eye: No discharge.         Left eye: No discharge.      Conjunctiva/sclera: Conjunctivae normal.      Pupils: Pupils are equal, round, and reactive to light.   Cardiovascular:      Rate and Rhythm: Normal rate and regular rhythm.      Pulses: Normal pulses.      Heart sounds: Normal heart sounds. No murmur heard.     No friction rub. No gallop.   Pulmonary:      Effort: Pulmonary effort is normal. No respiratory distress.      Breath sounds: Normal breath sounds. No stridor. No wheezing, rhonchi or rales.      Comments: Patient speaking full sentences without difficulty, generally  clear breath sounds in all lung fields with slightly diminished at the bases.  Chest:      Chest wall: No tenderness.   Abdominal:      General: Bowel sounds are normal. There is no distension.      Palpations: Abdomen is soft. There is no mass.      Tenderness: There is no abdominal tenderness. There is no guarding or rebound.      Hernia: No hernia is present.   Musculoskeletal:      Cervical back: Normal range of motion and neck supple.      Right lower leg: Edema present.      Left lower leg: Edema present.   Lymphadenopathy:      Cervical: No cervical adenopathy.   Skin:     General: Skin is warm and dry.      Capillary Refill: Capillary refill takes less than 2 seconds.      Coloration: Skin is not pale.      Findings: No erythema or rash.   Neurological:      General: No focal deficit present.      Mental Status: He is alert and oriented to person, place, and time. Mental status is at baseline.   Psychiatric:         Thought Content: Thought content normal.         Judgment: Judgment normal.         Vital Signs  ED Triage Vitals [04/06/24 0819]   Temperature Pulse Respirations Blood Pressure SpO2   98.1 °F (36.7 °C) 78 18 162/74 (!) 89 %      Temp Source Heart Rate Source Patient Position - Orthostatic VS BP Location FiO2 (%)   Oral Monitor Sitting Right arm --      Pain Score       6           Vitals:    04/06/24 0819   BP: 162/74   Pulse: 78   Patient Position - Orthostatic VS: Sitting         Visual Acuity      ED Medications  Medications - No data to display    Diagnostic Studies  Results Reviewed       Procedure Component Value Units Date/Time    Comprehensive metabolic panel [[id number]]  (Abnormal) Collected: 04/06/24 0829    Lab Status: Final result Specimen: Blood from Arm, Left Updated: 04/06/24 0925     Sodium 132 mmol/L      Potassium 6.5 mmol/L      Chloride 96 mmol/L      CO2 22 mmol/L      ANION GAP 14 mmol/L      BUN 86 mg/dL      Creatinine 9.96 mg/dL      Glucose 138 mg/dL      Calcium 8.2  mg/dL      AST 20 U/L      ALT 9 U/L      Alkaline Phosphatase 120 U/L      Total Protein 7.7 g/dL      Albumin 3.8 g/dL      Total Bilirubin 0.36 mg/dL      eGFR 4 ml/min/1.73sq m     Narrative:      National Kid[employer] Foundation guidelines for Chronic Kidney Disease (CKD):     Stage 1 with normal or high GFR (GFR > 90 mL/min/1.73 square meters)    Stage 2 Mild CKD (GFR = 60-89 mL/min/1.73 square meters)    Stage 3A Moderate CKD (GFR = 45-59 mL/min/1.73 square meters)    Stage 3B Moderate CKD (GFR = 30-44 mL/min/1.73 square meters)    Stage 4 Severe CKD (GFR = 15-29 mL/min/1.73 square meters)    Stage 5 End Stage CKD (GFR <15 mL/min/1.73 square meters)  Note: GFR calculation is accurate only with a steady state creatinine    Magnesium [[id number]]  (Normal) Collected: 04/06/24 0829    Lab Status: Final result Specimen: Blood from Arm, Left Updated: 04/06/24 0924     Magnesium 2.1 mg/dL     Phosphorus [[id number]]  (Abnormal) Collected: 04/06/24 0829    Lab Status: Final result Specimen: Blood from Arm, Left Updated: 04/06/24 0924     Phosphorus 5.2 mg/dL     CBC and differential [[id number]]  (Abnormal) Collected: 04/06/24 0914    Lab Status: Final result Specimen: Blood from Arm, Right Updated: 04/06/24 0921     WBC 10.32 Thousand/uL      RBC 3.37 Million/uL      Hemoglobin 10.1 g/dL      Hematocrit 32.7 %      MCV 97 fL      MCH 30.0 pg      MCHC 30.9 g/dL      RDW 17.3 %      MPV 10.7 fL      Platelets 267 Thousands/uL      nRBC 0 /100 WBCs      Neutrophils Relative 71 %      Immature Grans % 1 %      Lymphocytes Relative 12 %      Monocytes Relative 8 %      Eosinophils Relative 7 %      Basophils Relative 1 %      Neutrophils Absolute 7.41 Thousands/µL      Absolute Immature Grans 0.09 Thousand/uL      Absolute Lymphocytes 1.21 Thousands/µL      Absolute Monocytes 0.79 Thousand/µL      Eosinophils Absolute 0.76 Thousand/µL      Basophils Absolute 0.06 Thousands/µL     HS Troponin 0hr (reflex protocol)  [[id number]]  (Normal) Collected: 04/06/24 0829    Lab Status: Final result Specimen: Blood from Arm, Left Updated: 04/06/24 0900     hs TnI 0hr 16 ng/L     HS Troponin I 2hr [[id number]]     Lab Status: No result Specimen: Blood     Fingerstick Glucose (POCT) [[id number]]  (Normal) Collected: 04/06/24 0828    Lab Status: Final result Specimen: Blood Updated: 04/06/24 0829     POC Glucose 125 mg/dl                    XR chest 1 view portable    (Results Pending)              Procedures  ECG 12 Lead Documentation Only    Date/Time: 4/6/2024 8:27 AM    Performed by: Janelle Schreiber PA-C  Authorized by: Janelle Schreiber PA-C    Indications / Diagnosis:  Chest tightness  ECG reviewed by me, the ED Provider: yes    Patient location:  ED  Interpretation:     Interpretation: normal    Rate:     ECG rate:  78    ECG rate assessment: normal    Rhythm:     Rhythm: sinus rhythm    Ectopy:     Ectopy: none    QRS:     QRS axis:  Normal    QRS intervals:  Normal  Conduction:     Conduction: normal    ST segments:     ST segments:  Normal  T waves:     T waves: normal    CriticalCare Time    Date/Time: 4/6/2024 9:38 AM    Performed by: Janelle Schreiber PA-C  Authorized by: Janelle Schreiber PA-C    Critical care provider statement:     Critical care time (minutes):  45    Critical care time was exclusive of:  Separately billable procedures and treating other patients and teaching time    Critical care was necessary to treat or prevent imminent or life-threatening deterioration of the following conditions:  Metabolic crisis    Critical care was time spent personally by me on the following activities:  Blood draw for specimens, obtaining history from patient or surrogate, development of treatment plan with patient or surrogate, discussions with consultants, examination of patient, ordering and performing treatments and interventions, ordering and review of laboratory studies, ordering and review of radiographic studies,  re-evaluation of patient's condition and review of old charts    I assumed direction of critical care for this patient from another provider in my specialty: yes             ED Course  ED Course as of 04/06/24 0940   Sat Apr 06, 2024 0915 Messaged nephrology.    0922 Dr. Landa of nephro aware of low sat and K of 6.5. agrees will need dialysis and will move forward with admission.    0923 Messaged SLIM              HEART Risk Score      Flowsheet Row Most Recent Value   Heart Score Risk Calculator    History 0 Filed at: 04/06/2024 0938   ECG 0 Filed at: 04/06/2024 0938   Age 1 Filed at: 04/06/2024 0938   Risk Factors 2 Filed at: 04/06/2024 0938   Troponin 1 Filed at: 04/06/2024 0938   HEART Score 4 Filed at: 04/06/2024 0938                          SBIRT 22yo+      Flowsheet Row Most Recent Value   Initial Alcohol Screen: US AUDIT-C     1. How often do you have a drink containing alcohol? 0 Filed at: 04/06/2024 0827   2. How many drinks containing alcohol do you have on a typical day you are drinking?  0 Filed at: 04/06/2024 0827   3a. Male UNDER 65: How often do you have five or more drinks on one occasion? 0 Filed at: 04/06/2024 0827   Audit-C Score 0 Filed at: 04/06/2024 0827   NICOL[place]S: How many times in the past year have you...    Used an illegal drug or used a prescription medication for non-medical reasons? Never Filed at: 04/06/2024 0827                      Medical Decision Making  Patient is comfortable in no distress.  Troponin today is 16, 3 weeks ago it was 37, no EKG changes, specifically no T wave changes.  Potassium is 6.5, slightly hemolyzed.  Patient's oxygen is 89% on room air, when applying 3 L patient has increased to 92 to 95% on room air.  Speaking full sentences without difficulty.  Clear breath sounds.  Discussed case with nephrology on-call Dr. Landa who is in agreement with admission for dialysis to observation telemetry.  Patient is in agreement with admission.    Amount and/or Complexity  of Data Reviewed  Labs: ordered.  Radiology: ordered.    Risk  Decision regarding hospitalization.             Disposition  Final diagnoses:   Hyperkalemia   Chest tightness   Hypoxia     Time reflects when diagnosis was documented in both MDM as applicable and the Disposition within this note       Time User Action Codes Description Comment    4/6/2024  9:21 AM Janelle Schreiber [E87.5] Hyperkalemia     4/6/2024  9:36 AM Janelle Schreiber Add [R07.89] Chest tightness     4/6/2024  9:37 AM Janelle Schreiber Add [T80.89XA,  R09.02] Hypoxia associated with dialysis     4/6/2024  9:37 AM Janelle Schreiber Remove [T80.89XA,  R09.02] Hypoxia associated with dialysis     4/6/2024  9:37 AM Janelle Schreiber Add [R09.02] Hypoxia           ED Disposition       ED Disposition   Admit    Condition   Stable    Date/Time   Sat Apr 6, 2024 0936    Comment   Case was discussed with Dr. Ruiz and the patient's admission status was agreed to be Admission Status: observation status to the service of Dr. Ruiz .               Follow-up Information    None         Patient's Medications   Discharge Prescriptions    No medications on file       No discharge procedures on file.    PDMP Review         Value Time User    PDMP Reviewed  Yes 3/22/2024  3:06 PM Lore Silver DO            ED Provider  Electronically Signed by             Janelle Schreiber PA-C  04/06/24 0993

## 2024-04-06 NOTE — ASSESSMENT & PLAN NOTE
Lab Results   Component Value Date    HGBA1C 5.4 03/12/2024       Recent Labs     04/06/24  0828   POCGLU 125       Blood Sugar Average: Last 72 hrs:  (P) 125    Patient reports diet controlled not on any medications.  Last hemoglobin A1c 5.4.  Accu-Cheks before meals and at bedtime with sliding scale coverage
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
## 2024-04-07 LAB
ALBUMIN SERPL BCP-MCNC: 3.3 G/DL (ref 3.5–5)
ALP SERPL-CCNC: 99 U/L (ref 34–104)
ALT SERPL W P-5'-P-CCNC: 3 U/L (ref 7–52)
ANION GAP SERPL CALCULATED.3IONS-SCNC: 10 MMOL/L (ref 4–13)
AST SERPL W P-5'-P-CCNC: 13 U/L (ref 13–39)
BILIRUB SERPL-MCNC: 0.36 MG/DL (ref 0.2–1)
BUN SERPL-MCNC: 39 MG/DL (ref 5–25)
CALCIUM ALBUM COR SERPL-MCNC: 8.2 MG/DL (ref 8.3–10.1)
CALCIUM SERPL-MCNC: 7.6 MG/DL (ref 8.4–10.2)
CHLORIDE SERPL-SCNC: 97 MMOL/L (ref 96–108)
CO2 SERPL-SCNC: 24 MMOL/L (ref 21–32)
CREAT SERPL-MCNC: 6.24 MG/DL (ref 0.6–1.3)
ERYTHROCYTE [DISTWIDTH] IN BLOOD BY AUTOMATED COUNT: 17.2 % (ref 11.6–15.1)
GFR SERPL CREATININE-BSD FRML MDRD: 8 ML/MIN/1.73SQ M
GLUCOSE P FAST SERPL-MCNC: 90 MG/DL (ref 65–99)
GLUCOSE SERPL-MCNC: 102 MG/DL (ref 65–140)
GLUCOSE SERPL-MCNC: 113 MG/DL (ref 65–140)
GLUCOSE SERPL-MCNC: 124 MG/DL (ref 65–140)
GLUCOSE SERPL-MCNC: 90 MG/DL (ref 65–140)
GLUCOSE SERPL-MCNC: 97 MG/DL (ref 65–140)
HCT VFR BLD AUTO: 29.8 % (ref 36.5–49.3)
HGB BLD-MCNC: 9.2 G/DL (ref 12–17)
MAGNESIUM SERPL-MCNC: 2 MG/DL (ref 1.9–2.7)
MCH RBC QN AUTO: 30 PG (ref 26.8–34.3)
MCHC RBC AUTO-ENTMCNC: 30.9 G/DL (ref 31.4–37.4)
MCV RBC AUTO: 97 FL (ref 82–98)
MRSA NOSE QL CULT: NORMAL
PHOSPHATE SERPL-MCNC: 3.7 MG/DL (ref 2.3–4.1)
PLATELET # BLD AUTO: 244 THOUSANDS/UL (ref 149–390)
PMV BLD AUTO: 9.9 FL (ref 8.9–12.7)
POTASSIUM SERPL-SCNC: 4.3 MMOL/L (ref 3.5–5.3)
PROT SERPL-MCNC: 6.7 G/DL (ref 6.4–8.4)
RBC # BLD AUTO: 3.07 MILLION/UL (ref 3.88–5.62)
SODIUM SERPL-SCNC: 131 MMOL/L (ref 135–147)
WBC # BLD AUTO: 8.34 THOUSAND/UL (ref 4.31–10.16)

## 2024-04-07 PROCEDURE — 84100 ASSAY OF PHOSPHORUS: CPT | Performed by: INTERNAL MEDICINE

## 2024-04-07 PROCEDURE — 83735 ASSAY OF MAGNESIUM: CPT | Performed by: INTERNAL MEDICINE

## 2024-04-07 PROCEDURE — 85027 COMPLETE CBC AUTOMATED: CPT | Performed by: INTERNAL MEDICINE

## 2024-04-07 PROCEDURE — 99232 SBSQ HOSP IP/OBS MODERATE 35: CPT | Performed by: INTERNAL MEDICINE

## 2024-04-07 PROCEDURE — 80053 COMPREHEN METABOLIC PANEL: CPT | Performed by: NURSE PRACTITIONER

## 2024-04-07 PROCEDURE — 82948 REAGENT STRIP/BLOOD GLUCOSE: CPT

## 2024-04-07 PROCEDURE — 99232 SBSQ HOSP IP/OBS MODERATE 35: CPT | Performed by: NURSE PRACTITIONER

## 2024-04-07 RX ORDER — HYDROMORPHONE HCL IN WATER/PF 6 MG/30 ML
0.2 PATIENT CONTROLLED ANALGESIA SYRINGE INTRAVENOUS ONCE
Status: COMPLETED | OUTPATIENT
Start: 2024-04-07 | End: 2024-04-07

## 2024-04-07 RX ORDER — LIDOCAINE 50 MG/G
1 PATCH TOPICAL DAILY
Status: DISCONTINUED | OUTPATIENT
Start: 2024-04-07 | End: 2024-04-09 | Stop reason: HOSPADM

## 2024-04-07 RX ADMIN — HYDROMORPHONE HYDROCHLORIDE 0.2 MG: 0.2 INJECTION, SOLUTION INTRAMUSCULAR; INTRAVENOUS; SUBCUTANEOUS at 14:19

## 2024-04-07 RX ADMIN — HEPARIN SODIUM 5000 UNITS: 5000 INJECTION, SOLUTION INTRAVENOUS; SUBCUTANEOUS at 13:35

## 2024-04-07 RX ADMIN — SEVELAMER HYDROCHLORIDE 800 MG: 800 TABLET, FILM COATED ORAL at 11:41

## 2024-04-07 RX ADMIN — LIDOCAINE 1 PATCH: 50 PATCH TOPICAL at 00:49

## 2024-04-07 RX ADMIN — SEVELAMER HYDROCHLORIDE 800 MG: 800 TABLET, FILM COATED ORAL at 08:17

## 2024-04-07 RX ADMIN — GABAPENTIN 200 MG: 100 CAPSULE ORAL at 08:17

## 2024-04-07 RX ADMIN — GABAPENTIN 200 MG: 100 CAPSULE ORAL at 17:16

## 2024-04-07 RX ADMIN — SEVELAMER HYDROCHLORIDE 800 MG: 800 TABLET, FILM COATED ORAL at 15:59

## 2024-04-07 RX ADMIN — ATORVASTATIN CALCIUM 80 MG: 80 TABLET, FILM COATED ORAL at 15:59

## 2024-04-07 RX ADMIN — HEPARIN SODIUM 5000 UNITS: 5000 INJECTION, SOLUTION INTRAVENOUS; SUBCUTANEOUS at 05:50

## 2024-04-07 RX ADMIN — NIFEDIPINE 30 MG: 30 TABLET, EXTENDED RELEASE ORAL at 08:17

## 2024-04-07 RX ADMIN — ALLOPURINOL 100 MG: 100 TABLET ORAL at 08:17

## 2024-04-07 RX ADMIN — HEPARIN SODIUM 5000 UNITS: 5000 INJECTION, SOLUTION INTRAVENOUS; SUBCUTANEOUS at 22:30

## 2024-04-07 NOTE — ASSESSMENT & PLAN NOTE
Patient reports having chest pain starting on day previous to admission.  Describes it as a sense of fullness in his chest.  Denies any palpitations or radiation.  EKG shows sinus rhythm heart rate 78  Monitor on telemetry, no events noted, discontinued  Troponins negative  Suspect may be secondary to volume overload as patient has missed dialysis on Thursday and is due for dialysis day of admission.  Patient received dialysis 4/6; indicated barriers to transportation for Tuesday outpatient dialysis; discussed with case management; no resources available for patient transportation. Discussed with nephrology; will do dialysis on Monday, and patient will be able to have transportation for his Thursday dialylsis; plan for discharge after Monday dialysis   Denies any chest discomfort at this time
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
## 2024-04-07 NOTE — ASSESSMENT & PLAN NOTE
Patient has a history of hypertension, does take nifedipine 30 mg twice daily Monday Wednesday Friday and Sunday, and nifedipine 30 mg daily Tuesday Thursday and Saturdays.  Blood pressure currently 155/70  Monitor

## 2024-04-07 NOTE — ASSESSMENT & PLAN NOTE
Hypoxia present on admission evidenced by O2 sats 89% on room air on initial presentation to ED.  Currently is on 2 L nasal cannula with O2 sats 95%.  Lungs currently clear  Chest x-ray from 4/6 demonstrated vascular and interstitial prominence in the lungs with trace left pleural effusion suggesting fluid overload as per radiology report.  Suspect volume overload secondary to missed dialysis sessions.  Patient dialyzed on 4/6 with plan for dialysis on 4/8  Wean O2 as tolerated to keep O2 sats greater than 90%

## 2024-04-07 NOTE — PLAN OF CARE
Problem: CARDIOVASCULAR - ADULT  Goal: Maintains optimal cardiac output and hemodynamic stability  Description: INTERVENTIONS:  - Monitor I/O, vital signs and rhythm  - Monitor for S/S and trends of decreased cardiac output  - Administer and titrate ordered vasoactive medications to optimize hemodynamic stability  - Assess quality of pulses, skin color and temperature  - Assess for signs of decreased coronary artery perfusion  - Instruct patient to report change in severity of symptoms  Outcome: Progressing  Goal: Absence of cardiac dysrhythmias or at baseline rhythm  Description: INTERVENTIONS:  - Continuous cardiac monitoring, vital signs, obtain 12 lead EKG if ordered  - Administer antiarrhythmic and heart rate control medications as ordered  - Monitor electrolytes and administer replacement therapy as ordered  Outcome: Progressing     Problem: RESPIRATORY - ADULT  Goal: Achieves optimal ventilation and oxygenation  Description: INTERVENTIONS:  - Assess for changes in respiratory status  - Assess for changes in mentation and behavior  - Position to facilitate oxygenation and minimize respiratory effort  - Oxygen administered by appropriate delivery if ordered  - Initiate smoking cessation education as indicated  - Encourage broncho-pulmonary hygiene including cough, deep breathe, Incentive Spirometry  - Assess the need for suctioning and aspirate as needed  - Assess and instruct to report SOB or any respiratory difficulty  - Respiratory Therapy support as indicated  Outcome: Progressing     Problem: METABOLIC, FLUID AND ELECTROLYTES - ADULT  Goal: Electrolytes maintained within normal limits  Description: INTERVENTIONS:  - Monitor labs and assess patient for signs and symptoms of electrolyte imbalances  - Administer electrolyte replacement as ordered  - Monitor response to electrolyte replacements, including repeat lab results as appropriate  - Instruct patient on fluid and nutrition as appropriate  Outcome:  Progressing  Goal: Fluid balance maintained  Description: INTERVENTIONS:  - Monitor labs   - Monitor I/O and WT  - Instruct patient on fluid and nutrition as appropriate  - Assess for signs & symptoms of volume excess or deficit  Outcome: Progressing  Goal: Glucose maintained within target range  Description: INTERVENTIONS:  - Monitor Blood Glucose as ordered  - Assess for signs and symptoms of hyperglycemia and hypoglycemia  - Administer ordered medications to maintain glucose within target range  - Assess nutritional intake and initiate nutrition service referral as needed  Outcome: Progressing     Problem: SKIN/TISSUE INTEGRITY - ADULT  Goal: Skin Integrity remains intact(Skin Breakdown Prevention)  Description: Assess:  -Perform Praful assessment every shift  -Clean and moisturize skin every shift  -Inspect skin when repositioning, toileting, and assisting with ADLS  -Assess under medical devices such as elmira every shift  -Assess extremities for adequate circulation and sensation     Bed Management:  -Have minimal linens on bed & keep smooth, unwrinkled  -Change linens as needed when moist or perspiring  -Avoid sitting or lying in one position for more than 2 hours while in bed  -Keep HOB at 30 degrees     Outcome: Progressing     Problem: PAIN - ADULT  Goal: Verbalizes/displays adequate comfort level or baseline comfort level  Description: Interventions:  - Encourage patient to monitor pain and request assistance  - Assess pain using appropriate pain scale  - Administer analgesics based on type and severity of pain and evaluate response  - Implement non-pharmacological measures as appropriate and evaluate response  - Consider cultural and social influences on pain and pain management  - Notify physician/advanced practitioner if interventions unsuccessful or patient reports new pain  Outcome: Progressing     Problem: INFECTION - ADULT  Goal: Absence or prevention of progression during  hospitalization  Description: INTERVENTIONS:  - Assess and monitor for signs and symptoms of infection  - Monitor lab/diagnostic results  - Monitor all insertion sites, i.e. indwelling lines, tubes, and drains  - Monitor endotracheal if appropriate and nasal secretions for changes in amount and color  - [place] appropriate cooling/warming therapies per order  - Administer medications as ordered  - Instruct and encourage patient and family to use good hand hygiene technique  - Identify and instruct in appropriate isolation precautions for identified infection/condition  Outcome: Progressing  Goal: Absence of fever/infection during neutropenic period  Description: INTERVENTIONS:  - Monitor WBC    Outcome: Progressing     Problem: SAFETY ADULT  Goal: Patient will remain free of falls  Description: INTERVENTIONS:  - Educate patient/family on patient safety including physical limitations  - Instruct patient to call for assistance with activity   - Consult OT/PT to assist with strengthening/mobility   - Keep Call bell within reach  - Keep bed low and locked with side rails adjusted as appropriate  - Keep care items and personal belongings within reach  - Initiate and maintain comfort rounds  - Make Fall Risk Sign visible to staff  - Offer Toileting every 2 Hours, in advance of need  - Initiate/Maintain bed alarm  - Obtain necessary fall risk management equipment: yellow socks  - Apply yellow socks and bracelet for high fall risk patients  - Consider moving patient to room near nurses station  Outcome: Progressing  Goal: Maintain or return to baseline ADL function  Description: INTERVENTIONS:  -  Assess patient's ability to carry out ADLs; assess patient's baseline for ADL function and identify physical deficits which impact ability to perform ADLs (bathing, care of mouth/teeth, toileting, grooming, dressing, etc.)  - Assess/evaluate cause of self-care deficits   - Assess range of motion  - Assess patient's mobility;  develop plan if impaired  - Assess patient's need for assistive devices and provide as appropriate  - Encourage maximum independence but intervene and supervise when necessary  - Involve family in performance of ADLs  - Assess for home care needs following discharge   - Consider OT consult to assist with ADL evaluation and planning for discharge  - Provide patient education as appropriate  Outcome: Progressing  Goal: Maintains/Returns to pre admission functional level  Description: INTERVENTIONS:  - Perform AM-PAC 6 Click Basic Mobility/ Daily Activity assessment daily.  - Set and communicate daily mobility goal to care team and patient/family/caregiver.   - Collaborate with rehabilitation services on mobility goals if consulted  - Perform Range of Motion 2 times a day.  - Reposition patient every 2 hours.  - Dangle patient 2 times a day  - Stand patient 2 times a day  - Ambulate patient 2 times a day  - Out of bed to chair 2 times a day   - Out of bed for meals 2 times a day  - Out of bed for toileting  - Record patient progress and toleration of activity level   Outcome: Progressing     Problem: DISCHARGE PLANNING  Goal: Discharge to home or other facility with appropriate resources  Description: INTERVENTIONS:  - Identify barriers to discharge w/patient and caregiver  - Arrange for needed discharge resources and transportation as appropriate  - Identify discharge learning needs (meds, wound care, etc.)  - Arrange for interpretive services to assist at discharge as needed  - Refer to Case Management Department for coordinating discharge planning if the patient needs post-hospital services based on physician/advanced practitioner order or complex needs related to functional status, cognitive ability, or social support system  Outcome: Progressing     Problem: Knowledge Deficit  Goal: Patient/family/caregiver demonstrates understanding of disease process, treatment plan, medications, and discharge  instructions  Description: Complete learning assessment and assess knowledge base.  Interventions:  - Provide teaching at level of understanding  - Provide teaching via preferred learning methods  Outcome: Progressing

## 2024-04-07 NOTE — PLAN OF CARE
Problem: CARDIOVASCULAR - ADULT  Goal: Maintains optimal cardiac output and hemodynamic stability  Description: INTERVENTIONS:  - Monitor I/O, vital signs and rhythm  - Monitor for S/S and trends of decreased cardiac output  - Administer and titrate ordered vasoactive medications to optimize hemodynamic stability  - Assess quality of pulses, skin color and temperature  - Assess for signs of decreased coronary artery perfusion  - Instruct patient to report change in severity of symptoms  Outcome: Progressing  Goal: Absence of cardiac dysrhythmias or at baseline rhythm  Description: INTERVENTIONS:  - Continuous cardiac monitoring, vital signs, obtain 12 lead EKG if ordered  - Administer antiarrhythmic and heart rate control medications as ordered  - Monitor electrolytes and administer replacement therapy as ordered  Outcome: Progressing     Problem: RESPIRATORY - ADULT  Goal: Achieves optimal ventilation and oxygenation  Description: INTERVENTIONS:  - Assess for changes in respiratory status  - Assess for changes in mentation and behavior  - Position to facilitate oxygenation and minimize respiratory effort  - Oxygen administered by appropriate delivery if ordered  - Initiate smoking cessation education as indicated  - Encourage broncho-pulmonary hygiene including cough, deep breathe, Incentive Spirometry  - Assess the need for suctioning and aspirate as needed  - Assess and instruct to report SOB or any respiratory difficulty  - Respiratory Therapy support as indicated  Outcome: Progressing     Problem: METABOLIC, FLUID AND ELECTROLYTES - ADULT  Goal: Electrolytes maintained within normal limits  Description: INTERVENTIONS:  - Monitor labs and assess patient for signs and symptoms of electrolyte imbalances  - Administer electrolyte replacement as ordered  - Monitor response to electrolyte replacements, including repeat lab results as appropriate  - Instruct patient on fluid and nutrition as appropriate  Outcome:  Progressing  Goal: Fluid balance maintained  Description: INTERVENTIONS:  - Monitor labs   - Monitor I/O and WT  - Instruct patient on fluid and nutrition as appropriate  - Assess for signs & symptoms of volume excess or deficit  Outcome: Progressing  Goal: Glucose maintained within target range  Description: INTERVENTIONS:  - Monitor Blood Glucose as ordered  - Assess for signs and symptoms of hyperglycemia and hypoglycemia  - Administer ordered medications to maintain glucose within target range  - Assess nutritional intake and initiate nutrition service referral as needed  Outcome: Progressing     Problem: SKIN/TISSUE INTEGRITY - ADULT  Goal: Skin Integrity remains intact(Skin Breakdown Prevention)  Description: Assess:  -Perform Praful assessment every shift   -Clean and moisturize skin every shift   -Inspect skin when repositioning, toileting, and assisting with ADLS  -Assess under medical devices such as elmira every 4 hours   -Assess extremities for adequate circulation and sensation     Bed Management:  -Have minimal linens on bed & keep smooth, unwrinkled  -Change linens as needed when moist or perspiring  -Avoid sitting or lying in one position for more than 2 hours while in bed  -Keep HOB at 30 degrees     Toileting:  -Offer bedside commode  -Assess for incontinence every 2 hours   -Use incontinent care products after each incontinent episode such as foam cleanser    Activity:  -Mobilize patient 3 times a day  -Encourage activity and walks on unit  -Encourage or provide ROM exercises   -Turn and reposition patient every 2 Hours  -Use appropriate equipment to lift or move patient in bed  -Instruct/ Assist with weight shifting every 2 hours when out of bed in chair  -Consider limitation of chair time 4 hour intervals    Skin Care:  -Avoid use of baby powder, tape, friction and shearing, hot water or constrictive clothing  -Relieve pressure over bony prominences using waffle cushion   -Do not massage red  bony areas    Next Steps:  -Teach patient strategies to minimize risks such as weight shifting    -Consider consults to  interdisciplinary teams such as wound care nurse   Outcome: Progressing     Problem: PAIN - ADULT  Goal: Verbalizes/displays adequate comfort level or baseline comfort level  Description: Interventions:  - Encourage patient to monitor pain and request assistance  - Assess pain using appropriate pain scale  - Administer analgesics based on type and severity of pain and evaluate response  - Implement non-pharmacological measures as appropriate and evaluate response  - Consider cultural and social influences on pain and pain management  - Notify physician/advanced practitioner if interventions unsuccessful or patient reports new pain  Outcome: Progressing     Problem: INFECTION - ADULT  Goal: Absence or prevention of progression during hospitalization  Description: INTERVENTIONS:  - Assess and monitor for signs and symptoms of infection  - Monitor lab/diagnostic results  - Monitor all insertion sites, i.e. indwelling lines, tubes, and drains  - Monitor endotracheal if appropriate and nasal secretions for changes in amount and color  - [place] appropriate cooling/warming therapies per order  - Administer medications as ordered  - Instruct and encourage patient and family to use good hand hygiene technique  - Identify and instruct in appropriate isolation precautions for identified infection/condition  Outcome: Progressing  Goal: Absence of fever/infection during neutropenic period  Description: INTERVENTIONS:  - Monitor WBC    Outcome: Progressing     Problem: SAFETY ADULT  Goal: Patient will remain free of falls  Description: INTERVENTIONS:  - Educate patient/family on patient safety including physical limitations  - Instruct patient to call for assistance with activity   - Consult OT/PT to assist with strengthening/mobility   - Keep Call bell within reach  - Keep bed low and locked with side rails  adjusted as appropriate  - Keep care items and personal belongings within reach  - Initiate and maintain comfort rounds  - Make Fall Risk Sign visible to staff  - Offer Toileting every 2 Hours, in advance of need  - Initiate/Maintain bed alarm  - Obtain necessary fall risk management equipment: socks   - Apply yellow socks and bracelet for high fall risk patients  - Consider moving patient to room near nurses station  Outcome: Progressing  Goal: Maintain or return to baseline ADL function  Description: INTERVENTIONS:  -  Assess patient's ability to carry out ADLs; assess patient's baseline for ADL function and identify physical deficits which impact ability to perform ADLs (bathing, care of mouth/teeth, toileting, grooming, dressing, etc.)  - Assess/evaluate cause of self-care deficits   - Assess range of motion  - Assess patient's mobility; develop plan if impaired  - Assess patient's need for assistive devices and provide as appropriate  - Encourage maximum independence but intervene and supervise when necessary  - Involve family in performance of ADLs  - Assess for home care needs following discharge   - Consider OT consult to assist with ADL evaluation and planning for discharge  - Provide patient education as appropriate  Outcome: Progressing  Goal: Maintains/Returns to pre admission functional level  Description: INTERVENTIONS:  - Perform AM-PAC 6 Click Basic Mobility/ Daily Activity assessment daily.  - Set and communicate daily mobility goal to care team and patient/family/caregiver.   - Collaborate with rehabilitation services on mobility goals if consulted  - Perform Range of Motion 3 times a day.  - Reposition patient every 2 hours.  - Dangle patient 3 times a day  - Stand patient 3 times a day  - Ambulate patient 3 times a day  - Out of bed to chair 3 times a day   - Out of bed for meals 3 times a day  - Out of bed for toileting  - Record patient progress and toleration of activity level   Outcome:  Progressing     Problem: DISCHARGE PLANNING  Goal: Discharge to home or other facility with appropriate resources  Description: INTERVENTIONS:  - Identify barriers to discharge w/patient and caregiver  - Arrange for needed discharge resources and transportation as appropriate  - Identify discharge learning needs (meds, wound care, etc.)  - Arrange for interpretive services to assist at discharge as needed  - Refer to Case Management Department for coordinating discharge planning if the patient needs post-hospital services based on physician/advanced practitioner order or complex needs related to functional status, cognitive ability, or social support system  Outcome: Progressing     Problem: Knowledge Deficit  Goal: Patient/family/caregiver demonstrates understanding of disease process, treatment plan, medications, and discharge instructions  Description: Complete learning assessment and assess knowledge base.  Interventions:  - Provide teaching at level of understanding  - Provide teaching via preferred learning methods  Outcome: Progressing

## 2024-04-07 NOTE — ASSESSMENT & PLAN NOTE
Lab Results   Component Value Date    HGBA1C 5.4 03/12/2024       Recent Labs     04/06/24  1616 04/06/24 2021 04/07/24  0723 04/07/24  1127   POCGLU 116 131 97 102         Blood Sugar Average: Last 72 hrs:  (P) 119.5    Patient reports diet controlled not on any medications.  Last hemoglobin A1c 5.4.  Accu-Cheks before meals and at bedtime with sliding scale coverage
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
## 2024-04-07 NOTE — ASSESSMENT & PLAN NOTE
Hypokalemia present on admission with potassium of 6.5 slightly hemolyzed.  Telemetry ordered, sinus rhythm, no events, discontinued  Nephrology consulted.  Patient received dialysis 4/6, plan for dialysis 4/8  Repeat labs in a.m.
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
## 2024-04-08 ENCOUNTER — APPOINTMENT (INPATIENT)
Dept: DIALYSIS | Facility: HOSPITAL | Age: 64
DRG: 640 | End: 2024-04-08
Payer: MEDICARE

## 2024-04-08 PROBLEM — E87.5 HYPERKALEMIA: Status: RESOLVED | Noted: 2024-04-06 | Resolved: 2024-04-08

## 2024-04-08 LAB
ANION GAP SERPL CALCULATED.3IONS-SCNC: 9 MMOL/L (ref 4–13)
ATRIAL RATE: 78 BPM
BUN SERPL-MCNC: 52 MG/DL (ref 5–25)
CALCIUM SERPL-MCNC: 7.8 MG/DL (ref 8.4–10.2)
CHLORIDE SERPL-SCNC: 98 MMOL/L (ref 96–108)
CO2 SERPL-SCNC: 28 MMOL/L (ref 21–32)
CREAT SERPL-MCNC: 7.93 MG/DL (ref 0.6–1.3)
ERYTHROCYTE [DISTWIDTH] IN BLOOD BY AUTOMATED COUNT: 17.3 % (ref 11.6–15.1)
GFR SERPL CREATININE-BSD FRML MDRD: 6 ML/MIN/1.73SQ M
GLUCOSE SERPL-MCNC: 112 MG/DL (ref 65–140)
GLUCOSE SERPL-MCNC: 138 MG/DL (ref 65–140)
GLUCOSE SERPL-MCNC: 150 MG/DL (ref 65–140)
GLUCOSE SERPL-MCNC: 79 MG/DL (ref 65–140)
GLUCOSE SERPL-MCNC: 98 MG/DL (ref 65–140)
HCT VFR BLD AUTO: 30 % (ref 36.5–49.3)
HGB BLD-MCNC: 9.2 G/DL (ref 12–17)
MAGNESIUM SERPL-MCNC: 2.1 MG/DL (ref 1.9–2.7)
MCH RBC QN AUTO: 29.9 PG (ref 26.8–34.3)
MCHC RBC AUTO-ENTMCNC: 30.7 G/DL (ref 31.4–37.4)
MCV RBC AUTO: 97 FL (ref 82–98)
P AXIS: 48 DEGREES
PHOSPHATE SERPL-MCNC: 5.1 MG/DL (ref 2.3–4.1)
PLATELET # BLD AUTO: 253 THOUSANDS/UL (ref 149–390)
PMV BLD AUTO: 10.2 FL (ref 8.9–12.7)
POTASSIUM SERPL-SCNC: 4.6 MMOL/L (ref 3.5–5.3)
PR INTERVAL: 170 MS
QRS AXIS: 2 DEGREES
QRSD INTERVAL: 78 MS
QT INTERVAL: 398 MS
QTC INTERVAL: 453 MS
RBC # BLD AUTO: 3.08 MILLION/UL (ref 3.88–5.62)
SODIUM SERPL-SCNC: 135 MMOL/L (ref 135–147)
T WAVE AXIS: 71 DEGREES
VENTRICULAR RATE: 78 BPM
WBC # BLD AUTO: 6.08 THOUSAND/UL (ref 4.31–10.16)

## 2024-04-08 PROCEDURE — 82948 REAGENT STRIP/BLOOD GLUCOSE: CPT

## 2024-04-08 PROCEDURE — 84100 ASSAY OF PHOSPHORUS: CPT | Performed by: INTERNAL MEDICINE

## 2024-04-08 PROCEDURE — 90935 HEMODIALYSIS ONE EVALUATION: CPT | Performed by: INTERNAL MEDICINE

## 2024-04-08 PROCEDURE — 93010 ELECTROCARDIOGRAM REPORT: CPT | Performed by: INTERNAL MEDICINE

## 2024-04-08 PROCEDURE — 5A1D70Z PERFORMANCE OF URINARY FILTRATION, INTERMITTENT, LESS THAN 6 HOURS PER DAY: ICD-10-PCS | Performed by: INTERNAL MEDICINE

## 2024-04-08 PROCEDURE — 85027 COMPLETE CBC AUTOMATED: CPT | Performed by: INTERNAL MEDICINE

## 2024-04-08 PROCEDURE — 99232 SBSQ HOSP IP/OBS MODERATE 35: CPT

## 2024-04-08 PROCEDURE — 80048 BASIC METABOLIC PNL TOTAL CA: CPT | Performed by: INTERNAL MEDICINE

## 2024-04-08 PROCEDURE — 83735 ASSAY OF MAGNESIUM: CPT | Performed by: INTERNAL MEDICINE

## 2024-04-08 RX ORDER — HYDROMORPHONE HCL IN WATER/PF 6 MG/30 ML
0.2 PATIENT CONTROLLED ANALGESIA SYRINGE INTRAVENOUS ONCE
Status: COMPLETED | OUTPATIENT
Start: 2024-04-08 | End: 2024-04-08

## 2024-04-08 RX ADMIN — ATORVASTATIN CALCIUM 80 MG: 80 TABLET, FILM COATED ORAL at 17:05

## 2024-04-08 RX ADMIN — GABAPENTIN 200 MG: 100 CAPSULE ORAL at 17:04

## 2024-04-08 RX ADMIN — HYDROMORPHONE HYDROCHLORIDE 0.2 MG: 0.2 INJECTION, SOLUTION INTRAMUSCULAR; INTRAVENOUS; SUBCUTANEOUS at 22:03

## 2024-04-08 RX ADMIN — GABAPENTIN 200 MG: 100 CAPSULE ORAL at 11:21

## 2024-04-08 RX ADMIN — HYDROMORPHONE HYDROCHLORIDE 0.2 MG: 0.2 INJECTION, SOLUTION INTRAMUSCULAR; INTRAVENOUS; SUBCUTANEOUS at 18:33

## 2024-04-08 RX ADMIN — HEPARIN SODIUM 5000 UNITS: 5000 INJECTION, SOLUTION INTRAVENOUS; SUBCUTANEOUS at 21:55

## 2024-04-08 RX ADMIN — NIFEDIPINE 30 MG: 30 TABLET, EXTENDED RELEASE ORAL at 11:22

## 2024-04-08 RX ADMIN — HEPARIN SODIUM 5000 UNITS: 5000 INJECTION, SOLUTION INTRAVENOUS; SUBCUTANEOUS at 06:18

## 2024-04-08 RX ADMIN — HEPARIN SODIUM 5000 UNITS: 5000 INJECTION, SOLUTION INTRAVENOUS; SUBCUTANEOUS at 13:56

## 2024-04-08 RX ADMIN — SEVELAMER HYDROCHLORIDE 800 MG: 800 TABLET, FILM COATED ORAL at 11:22

## 2024-04-08 RX ADMIN — ALLOPURINOL 100 MG: 100 TABLET ORAL at 11:22

## 2024-04-08 RX ADMIN — SEVELAMER HYDROCHLORIDE 800 MG: 800 TABLET, FILM COATED ORAL at 12:40

## 2024-04-08 RX ADMIN — SEVELAMER HYDROCHLORIDE 800 MG: 800 TABLET, FILM COATED ORAL at 17:05

## 2024-04-08 RX ADMIN — INSULIN LISPRO 1 UNITS: 100 INJECTION, SOLUTION INTRAVENOUS; SUBCUTANEOUS at 17:05

## 2024-04-08 NOTE — ASSESSMENT & PLAN NOTE
Lab Results   Component Value Date    HGBA1C 5.4 03/12/2024       Recent Labs     04/07/24  1608 04/07/24 2011 04/08/24  0816 04/08/24  1159   POCGLU 113 124 98 112         Blood Sugar Average: Last 72 hrs:  (P) 116.4    Patient reports diet controlled not on any medications  Last hemoglobin A1c 5.4  Accu-Cheks before meals and at bedtime with sliding scale coverage

## 2024-04-08 NOTE — PLAN OF CARE
Problem: CARDIOVASCULAR - ADULT  Goal: Maintains optimal cardiac output and hemodynamic stability  Description: INTERVENTIONS:  - Monitor I/O, vital signs and rhythm  - Monitor for S/S and trends of decreased cardiac output  - Administer and titrate ordered vasoactive medications to optimize hemodynamic stability  - Assess quality of pulses, skin color and temperature  - Assess for signs of decreased coronary artery perfusion  - Instruct patient to report change in severity of symptoms  Outcome: Progressing  Goal: Absence of cardiac dysrhythmias or at baseline rhythm  Description: INTERVENTIONS:  - Continuous cardiac monitoring, vital signs, obtain 12 lead EKG if ordered  - Administer antiarrhythmic and heart rate control medications as ordered  - Monitor electrolytes and administer replacement therapy as ordered  Outcome: Progressing     Problem: RESPIRATORY - ADULT  Goal: Achieves optimal ventilation and oxygenation  Description: INTERVENTIONS:  - Assess for changes in respiratory status  - Assess for changes in mentation and behavior  - Position to facilitate oxygenation and minimize respiratory effort  - Oxygen administered by appropriate delivery if ordered  - Initiate smoking cessation education as indicated  - Encourage broncho-pulmonary hygiene including cough, deep breathe, Incentive Spirometry  - Assess the need for suctioning and aspirate as needed  - Assess and instruct to report SOB or any respiratory difficulty  - Respiratory Therapy support as indicated  Outcome: Progressing     Problem: METABOLIC, FLUID AND ELECTROLYTES - ADULT  Goal: Electrolytes maintained within normal limits  Description: INTERVENTIONS:  - Monitor labs and assess patient for signs and symptoms of electrolyte imbalances  - Administer electrolyte replacement as ordered  - Monitor response to electrolyte replacements, including repeat lab results as appropriate  - Instruct patient on fluid and nutrition as appropriate  Outcome:  Progressing  Goal: Fluid balance maintained  Description: INTERVENTIONS:  - Monitor labs   - Monitor I/O and WT  - Instruct patient on fluid and nutrition as appropriate  - Assess for signs & symptoms of volume excess or deficit  Outcome: Progressing  Goal: Glucose maintained within target range  Description: INTERVENTIONS:  - Monitor Blood Glucose as ordered  - Assess for signs and symptoms of hyperglycemia and hypoglycemia  - Administer ordered medications to maintain glucose within target range  - Assess nutritional intake and initiate nutrition service referral as needed  Outcome: Progressing     Problem: SKIN/TISSUE INTEGRITY - ADULT  Goal: Skin Integrity remains intact(Skin Breakdown Prevention)  Description: Assess:  -Perform Praful assessment every shift  -Clean and moisturize skin every shift  -Inspect skin when repositioning, toileting, and assisting with ADLS  -Assess under medical devices such as scd's every shift  -Assess extremities for adequate circulation and sensation     Bed Management:  -Have minimal linens on bed & keep smooth, unwrinkled  -Change linens as needed when moist or perspiring  -Avoid sitting or lying in one position for more than 2 hours while in bed  -Keep HOB at 45degrees     Toileting:  -Offer bedside commode  -Assess for incontinence every hours  -Use incontinent care products after each incontinent episode such as pt/ot    Activity:  -Mobilize patient 3 times a day  -Encourage activity and walks on unit  -Encourage or provide ROM exercises   -Turn and reposition patient every 4 Hours  -Use appropriate equipment to lift or move patient in bed  -Instruct/ Assist with weight shifting every 2 hours when out of bed in chair  -Consider limitation of chair time 2 hour intervals    Skin Care:  -Avoid use of baby powder, tape, friction and shearing, hot water or constrictive clothing  -Relieve pressure over bony prominences using allyven  -Do not massage red bony areas    Next  Steps:  -Teach patient strategies to minimize risks such as turn and reposition   -Consider consults to  interdisciplinary teams such as pt/ot  Outcome: Progressing     Problem: PAIN - ADULT  Goal: Verbalizes/displays adequate comfort level or baseline comfort level  Description: Interventions:  - Encourage patient to monitor pain and request assistance  - Assess pain using appropriate pain scale  - Administer analgesics based on type and severity of pain and evaluate response  - Implement non-pharmacological measures as appropriate and evaluate response  - Consider cultural and social influences on pain and pain management  - Notify physician/advanced practitioner if interventions unsuccessful or patient reports new pain  Outcome: Progressing     Problem: INFECTION - ADULT  Goal: Absence or prevention of progression during hospitalization  Description: INTERVENTIONS:  - Assess and monitor for signs and symptoms of infection  - Monitor lab/diagnostic results  - Monitor all insertion sites, i.e. indwelling lines, tubes, and drains  - Monitor endotracheal if appropriate and nasal secretions for changes in amount and color  - [place] appropriate cooling/warming therapies per order  - Administer medications as ordered  - Instruct and encourage patient and family to use good hand hygiene technique  - Identify and instruct in appropriate isolation precautions for identified infection/condition  Outcome: Progressing  Goal: Absence of fever/infection during neutropenic period  Description: INTERVENTIONS:  - Monitor WBC    Outcome: Progressing     Problem: SAFETY ADULT  Goal: Patient will remain free of falls  Description: INTERVENTIONS:  - Educate patient/family on patient safety including physical limitations  - Instruct patient to call for assistance with activity   - Consult OT/PT to assist with strengthening/mobility   - Keep Call bell within reach  - Keep bed low and locked with side rails adjusted as appropriate  -  Keep care items and personal belongings within reach  - Initiate and maintain comfort rounds  - Make Fall Risk Sign visible to staff  - Offer Toileting every 2 Hours, in advance of need  - Initiate/Maintain bed/chair alarm  - Obtain necessary fall risk management equipment: bed/chair alarm  - Apply yellow socks and bracelet for high fall risk patients  - Consider moving patient to room near nurses station  Outcome: Progressing  Goal: Maintain or return to baseline ADL function  Description: INTERVENTIONS:  -  Assess patient's ability to carry out ADLs; assess patient's baseline for ADL function and identify physical deficits which impact ability to perform ADLs (bathing, care of mouth/teeth, toileting, grooming, dressing, etc.)  - Assess/evaluate cause of self-care deficits   - Assess range of motion  - Assess patient's mobility; develop plan if impaired  - Assess patient's need for assistive devices and provide as appropriate  - Encourage maximum independence but intervene and supervise when necessary  - Involve family in performance of ADLs  - Assess for home care needs following discharge   - Consider OT consult to assist with ADL evaluation and planning for discharge  - Provide patient education as appropriate  Outcome: Progressing  Goal: Maintains/Returns to pre admission functional level  Description: INTERVENTIONS:  - Perform AM-PAC 6 Click Basic Mobility/ Daily Activity assessment daily.  - Set and communicate daily mobility goal to care team and patient/family/caregiver.   - Collaborate with rehabilitation services on mobility goals if consulted  - Perform Range of Motion 3 times a day.  - Reposition patient every 3 hours.  - Dangle patient 3 times a day  - Stand patient 3 times a day  - Ambulate patient 3 times a day  - Out of bed to chair 3 times a day   - Out of bed for meals 3 times a day  - Out of bed for toileting  - Record patient progress and toleration of activity level   Outcome: Progressing      Problem: DISCHARGE PLANNING  Goal: Discharge to home or other facility with appropriate resources  Description: INTERVENTIONS:  - Identify barriers to discharge w/patient and caregiver  - Arrange for needed discharge resources and transportation as appropriate  - Identify discharge learning needs (meds, wound care, etc.)  - Arrange for interpretive services to assist at discharge as needed  - Refer to Case Management Department for coordinating discharge planning if the patient needs post-hospital services based on physician/advanced practitioner order or complex needs related to functional status, cognitive ability, or social support system  Outcome: Progressing     Problem: Knowledge Deficit  Goal: Patient/family/caregiver demonstrates understanding of disease process, treatment plan, medications, and discharge instructions  Description: Complete learning assessment and assess knowledge base.  Interventions:  - Provide teaching at level of understanding  - Provide teaching via preferred learning methods  Outcome: Progressing     Problem: Nutrition/Hydration-ADULT  Goal: Nutrient/Hydration intake appropriate for improving, restoring or maintaining nutritional needs  Description: Monitor and assess patient's nutrition/hydration status for malnutrition. Collaborate with interdisciplinary team and initiate plan and interventions as ordered.  Monitor patient's weight and dietary intake as ordered or per policy. Utilize nutrition screening tool and intervene as necessary. Determine patient's food preferences and provide high-protein, high-caloric foods as appropriate.     INTERVENTIONS:  - Monitor oral intake, urinary output, labs, and treatment plans  - Assess nutrition and hydration status and recommend course of action  - Evaluate amount of meals eaten  - Assist patient with eating if necessary   - Allow adequate time for meals  - Recommend/ encourage appropriate diets, oral nutritional supplements, and  vitamin/mineral supplements  - Order, calculate, and assess calorie counts as needed  - Recommend, monitor, and adjust tube feedings and TPN/PPN based on assessed needs  - Assess need for intravenous fluids  - Provide specific nutrition/hydration education as appropriate  - Include patient/family/caregiver in decisions related to nutrition  Outcome: Progressing

## 2024-04-08 NOTE — ASSESSMENT & PLAN NOTE
Hypokalemia present on admission with potassium of 6.5 slightly hemolyzed  Telemetry ordered, sinus rhythm, no events, discontinued  Nephrology following  resolved
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
## 2024-04-08 NOTE — PLAN OF CARE
Problem: CARDIOVASCULAR - ADULT  Goal: Maintains optimal cardiac output and hemodynamic stability  Description: INTERVENTIONS:  - Monitor I/O, vital signs and rhythm  - Monitor for S/S and trends of decreased cardiac output  - Administer and titrate ordered vasoactive medications to optimize hemodynamic stability  - Assess quality of pulses, skin color and temperature  - Assess for signs of decreased coronary artery perfusion  - Instruct patient to report change in severity of symptoms  Outcome: Progressing  Goal: Absence of cardiac dysrhythmias or at baseline rhythm  Description: INTERVENTIONS:  - Continuous cardiac monitoring, vital signs, obtain 12 lead EKG if ordered  - Administer antiarrhythmic and heart rate control medications as ordered  - Monitor electrolytes and administer replacement therapy as ordered  Outcome: Progressing     Problem: RESPIRATORY - ADULT  Goal: Achieves optimal ventilation and oxygenation  Description: INTERVENTIONS:  - Assess for changes in respiratory status  - Assess for changes in mentation and behavior  - Position to facilitate oxygenation and minimize respiratory effort  - Oxygen administered by appropriate delivery if ordered  - Initiate smoking cessation education as indicated  - Encourage broncho-pulmonary hygiene including cough, deep breathe, Incentive Spirometry  - Assess the need for suctioning and aspirate as needed  - Assess and instruct to report SOB or any respiratory difficulty  - Respiratory Therapy support as indicated  Outcome: Progressing     Problem: METABOLIC, FLUID AND ELECTROLYTES - ADULT  Goal: Electrolytes maintained within normal limits  Description: INTERVENTIONS:  - Monitor labs and assess patient for signs and symptoms of electrolyte imbalances  - Administer electrolyte replacement as ordered  - Monitor response to electrolyte replacements, including repeat lab results as appropriate  - Instruct patient on fluid and nutrition as appropriate  Outcome:  Progressing  Goal: Fluid balance maintained  Description: INTERVENTIONS:  - Monitor labs   - Monitor I/O and WT  - Instruct patient on fluid and nutrition as appropriate  - Assess for signs & symptoms of volume excess or deficit  Outcome: Progressing  Goal: Glucose maintained within target range  Description: INTERVENTIONS:  - Monitor Blood Glucose as ordered  - Assess for signs and symptoms of hyperglycemia and hypoglycemia  - Administer ordered medications to maintain glucose within target range  - Assess nutritional intake and initiate nutrition service referral as needed  Outcome: Progressing     Problem: SKIN/TISSUE INTEGRITY - ADULT  Goal: Skin Integrity remains intact(Skin Breakdown Prevention)  Description: Assess:  -Perform Praful assessment every shift  -Clean and moisturize skin every shift  -Inspect skin when repositioning, toileting, and assisting with ADLS  -Assess under medical devices such as [place] every shift  -Assess extremities for adequate circulation and sensation     Bed Management:  -Have minimal linens on bed & keep smooth, unwrinkled  -Change linens as needed when moist or perspiring  -Avoid sitting or lying in one position for more than 2 hours while in bed  -Keep HOB at 30degrees     Toileting:  -Offer bedside commode  -Assess for incontinence every shift  -Use incontinent care products after each incontinent episode such as calazime    Activity:  -Mobilize patient 2 times a day  -Encourage activity and walks on unit  -Encourage or provide ROM exercises   -Turn and reposition patient every 2 Hours  -Use appropriate equipment to lift or move patient in bed  -Instruct/ Assist with weight shifting every shfi5 when out of bed in chair  -Consider limitation of chair time 2 hour intervals    Skin Care:  -Avoid use of baby powder, tape, friction and shearing, hot water or constrictive clothing  -Relieve pressure over bony prominences using alyvan  -Do not massage red bony areas    Next  Steps:  -Teach patient strategies to minimize risks such as keeping weight off wounds   -Consider consults to  interdisciplinary teams such as PT  Outcome: Progressing     Problem: PAIN - ADULT  Goal: Verbalizes/displays adequate comfort level or baseline comfort level  Description: Interventions:  - Encourage patient to monitor pain and request assistance  - Assess pain using appropriate pain scale  - Administer analgesics based on type and severity of pain and evaluate response  - Implement non-pharmacological measures as appropriate and evaluate response  - Consider cultural and social influences on pain and pain management  - Notify physician/advanced practitioner if interventions unsuccessful or patient reports new pain  Outcome: Progressing     Problem: INFECTION - ADULT  Goal: Absence or prevention of progression during hospitalization  Description: INTERVENTIONS:  - Assess and monitor for signs and symptoms of infection  - Monitor lab/diagnostic results  - Monitor all insertion sites, i.e. indwelling lines, tubes, and drains  - Monitor endotracheal if appropriate and nasal secretions for changes in amount and color  - [place] appropriate cooling/warming therapies per order  - Administer medications as ordered  - Instruct and encourage patient and family to use good hand hygiene technique  - Identify and instruct in appropriate isolation precautions for identified infection/condition  Outcome: Progressing  Goal: Absence of fever/infection during neutropenic period  Description: INTERVENTIONS:  - Monitor WBC    Outcome: Progressing     Problem: SAFETY ADULT  Goal: Patient will remain free of falls  Description: INTERVENTIONS:  - Educate patient/family on patient safety including physical limitations  - Instruct patient to call for assistance with activity   - Consult OT/PT to assist with strengthening/mobility   - Keep Call bell within reach  - Keep bed low and locked with side rails adjusted as  appropriate  - Keep care items and personal belongings within reach  - Initiate and maintain comfort rounds  - Make Fall Risk Sign visible to staff  - Offer Toileting every 2 Hours, in advance of need  - Initiate/Maintain bed alarm  - Obtain necessary fall risk management equipment: yellow socks  - Apply yellow socks and bracelet for high fall risk patients  - Consider moving patient to room near nurses station  Outcome: Progressing  Goal: Maintain or return to baseline ADL function  Description: INTERVENTIONS:  -  Assess patient's ability to carry out ADLs; assess patient's baseline for ADL function and identify physical deficits which impact ability to perform ADLs (bathing, care of mouth/teeth, toileting, grooming, dressing, etc.)  - Assess/evaluate cause of self-care deficits   - Assess range of motion  - Assess patient's mobility; develop plan if impaired  - Assess patient's need for assistive devices and provide as appropriate  - Encourage maximum independence but intervene and supervise when necessary  - Involve family in performance of ADLs  - Assess for home care needs following discharge   - Consider OT consult to assist with ADL evaluation and planning for discharge  - Provide patient education as appropriate  Outcome: Progressing  Goal: Maintains/Returns to pre admission functional level  Description: INTERVENTIONS:  - Perform AM-PAC 6 Click Basic Mobility/ Daily Activity assessment daily.  - Set and communicate daily mobility goal to care team and patient/family/caregiver.   - Collaborate with rehabilitation services on mobility goals if consulted  - Perform Range of Motion 2 times a day.  - Reposition patient every 2 hours.  - Dangle patient 2 times a day  - Stand patient 2 times a day  - Ambulate patient 2 times a day  - Out of bed to chair 2 times a day   - Out of bed for meals 2 times a day  - Out of bed for toileting  - Record patient progress and toleration of activity level   Outcome:  Progressing     Problem: DISCHARGE PLANNING  Goal: Discharge to home or other facility with appropriate resources  Description: INTERVENTIONS:  - Identify barriers to discharge w/patient and caregiver  - Arrange for needed discharge resources and transportation as appropriate  - Identify discharge learning needs (meds, wound care, etc.)  - Arrange for interpretive services to assist at discharge as needed  - Refer to Case Management Department for coordinating discharge planning if the patient needs post-hospital services based on physician/advanced practitioner order or complex needs related to functional status, cognitive ability, or social support system  Outcome: Progressing     Problem: Knowledge Deficit  Goal: Patient/family/caregiver demonstrates understanding of disease process, treatment plan, medications, and discharge instructions  Description: Complete learning assessment and assess knowledge base.  Interventions:  - Provide teaching at level of understanding  - Provide teaching via preferred learning methods  Outcome: Progressing     Problem: Nutrition/Hydration-ADULT  Goal: Nutrient/Hydration intake appropriate for improving, restoring or maintaining nutritional needs  Description: Monitor and assess patient's nutrition/hydration status for malnutrition. Collaborate with interdisciplinary team and initiate plan and interventions as ordered.  Monitor patient's weight and dietary intake as ordered or per policy. Utilize nutrition screening tool and intervene as necessary. Determine patient's food preferences and provide high-protein, high-caloric foods as appropriate.     INTERVENTIONS:  - Monitor oral intake, urinary output, labs, and treatment plans  - Assess nutrition and hydration status and recommend course of action  - Evaluate amount of meals eaten  - Assist patient with eating if necessary   - Allow adequate time for meals  - Recommend/ encourage appropriate diets, oral nutritional supplements,  and vitamin/mineral supplements  - Order, calculate, and assess calorie counts as needed  - Recommend, monitor, and adjust tube feedings and TPN/PPN based on assessed needs  - Assess need for intravenous fluids  - Provide specific nutrition/hydration education as appropriate  - Include patient/family/caregiver in decisions related to nutrition  Outcome: Progressing

## 2024-04-08 NOTE — ASSESSMENT & PLAN NOTE
Patient reports having chest pain/fullness starting day prior to admission.  EKG shows sinus rhythm heart rate 78  Monitor on telemetry, no events noted, discontinued  Troponins negative  Suspect may be secondary to volume overload as patient has missed dialysis on Thursday  Denies any chest discomfort at this time

## 2024-04-08 NOTE — ASSESSMENT & PLAN NOTE
Patient has a history of hypertension, does take nifedipine 30 mg twice daily Monday Wednesday Friday and Sunday, and nifedipine 30 mg daily Tuesday Thursday and Saturdays  Bp stable
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
## 2024-04-08 NOTE — ASSESSMENT & PLAN NOTE
Hypoxia present on admission evidenced by O2 sats 89% on room air on initial presentation to ED  Currently is on 2 L nasal cannula with O2 sats 95%  Lungs currently clear  Chest x-ray from 4/6 demonstrated vascular and interstitial prominence in the lungs with trace left pleural effusion suggesting fluid overload as per radiology report  Suspect volume overload secondary to missed dialysis sessions  Patient dialyzed on 4/6 with plan for dialysis on 4/8  Wean O2 as tolerated to keep O2 sats greater than 90%

## 2024-04-08 NOTE — PLAN OF CARE
Target UF Goal  2-3  L as tolerated. Patient dialyzing for 4 hours on 3 K bath for serum K of  4.3  per protocol. Treatment plan reviewed with Dr. Mack via tiger text.   Problem: METABOLIC, FLUID AND ELECTROLYTES - ADULT  Goal: Electrolytes maintained within normal limits  Description: INTERVENTIONS:  - Monitor labs and assess patient for signs and symptoms of electrolyte imbalances  - Administer electrolyte replacement as ordered  - Monitor response to electrolyte replacements, including repeat lab results as appropriate  - Instruct patient on fluid and nutrition as appropriate  Outcome: Progressing  Goal: Fluid balance maintained  Description: INTERVENTIONS:  - Monitor labs   - Monitor I/O and WT  - Instruct patient on fluid and nutrition as appropriate  - Assess for signs & symptoms of volume excess or deficit  Outcome: Progressing   Post-Dialysis RN Treatment Note    Blood Pressure:  Pre 148/69 mm/Hg  Post 134/71 mmHg   EDW  108 kg as per patient   Weight:  Pre 113.7 kg   Post 110.7 kg   Mode of weight measurement: Standing Scale   Volume Removed  3000 ml    Treatment duration 180 minutes    NS given  No    Treatment shortened? Yes, describe: as per Dr. Mack   Medications given during Rx None Reported   Estimated Kt/V  None Reported   Access type: Permacath/TDC   Access Issues: No    Report called to primary nurse   Yes Janna Isidro RN

## 2024-04-09 ENCOUNTER — APPOINTMENT (INPATIENT)
Dept: DIALYSIS | Facility: HOSPITAL | Age: 64
DRG: 640 | End: 2024-04-09
Attending: INTERNAL MEDICINE
Payer: MEDICARE

## 2024-04-09 VITALS
WEIGHT: 244 LBS | HEART RATE: 74 BPM | TEMPERATURE: 98.3 F | BODY MASS INDEX: 32.34 KG/M2 | SYSTOLIC BLOOD PRESSURE: 140 MMHG | RESPIRATION RATE: 16 BRPM | HEIGHT: 73 IN | OXYGEN SATURATION: 90 % | DIASTOLIC BLOOD PRESSURE: 65 MMHG

## 2024-04-09 PROBLEM — R09.02 HYPOXIA: Status: RESOLVED | Noted: 2024-04-06 | Resolved: 2024-04-09

## 2024-04-09 PROBLEM — R07.9 CHEST PAIN: Status: RESOLVED | Noted: 2024-04-06 | Resolved: 2024-04-09

## 2024-04-09 LAB
ANION GAP SERPL CALCULATED.3IONS-SCNC: 9 MMOL/L (ref 4–13)
BUN SERPL-MCNC: 39 MG/DL (ref 5–25)
CALCIUM SERPL-MCNC: 7.8 MG/DL (ref 8.4–10.2)
CHLORIDE SERPL-SCNC: 97 MMOL/L (ref 96–108)
CO2 SERPL-SCNC: 29 MMOL/L (ref 21–32)
CREAT SERPL-MCNC: 6.9 MG/DL (ref 0.6–1.3)
ERYTHROCYTE [DISTWIDTH] IN BLOOD BY AUTOMATED COUNT: 17.2 % (ref 11.6–15.1)
GFR SERPL CREATININE-BSD FRML MDRD: 7 ML/MIN/1.73SQ M
GLUCOSE SERPL-MCNC: 101 MG/DL (ref 65–140)
GLUCOSE SERPL-MCNC: 120 MG/DL (ref 65–140)
GLUCOSE SERPL-MCNC: 173 MG/DL (ref 65–140)
GLUCOSE SERPL-MCNC: 92 MG/DL (ref 65–140)
HCT VFR BLD AUTO: 28.9 % (ref 36.5–49.3)
HGB BLD-MCNC: 8.9 G/DL (ref 12–17)
MAGNESIUM SERPL-MCNC: 2.1 MG/DL (ref 1.9–2.7)
MCH RBC QN AUTO: 29.7 PG (ref 26.8–34.3)
MCHC RBC AUTO-ENTMCNC: 30.8 G/DL (ref 31.4–37.4)
MCV RBC AUTO: 96 FL (ref 82–98)
PHOSPHATE SERPL-MCNC: 4.9 MG/DL (ref 2.3–4.1)
PLATELET # BLD AUTO: 253 THOUSANDS/UL (ref 149–390)
PMV BLD AUTO: 10.2 FL (ref 8.9–12.7)
POTASSIUM SERPL-SCNC: 4.3 MMOL/L (ref 3.5–5.3)
RBC # BLD AUTO: 3 MILLION/UL (ref 3.88–5.62)
SODIUM SERPL-SCNC: 135 MMOL/L (ref 135–147)
WBC # BLD AUTO: 5.2 THOUSAND/UL (ref 4.31–10.16)

## 2024-04-09 PROCEDURE — 85027 COMPLETE CBC AUTOMATED: CPT

## 2024-04-09 PROCEDURE — 82948 REAGENT STRIP/BLOOD GLUCOSE: CPT

## 2024-04-09 PROCEDURE — 83735 ASSAY OF MAGNESIUM: CPT

## 2024-04-09 PROCEDURE — 99239 HOSP IP/OBS DSCHRG MGMT >30: CPT

## 2024-04-09 PROCEDURE — 84100 ASSAY OF PHOSPHORUS: CPT

## 2024-04-09 PROCEDURE — 99232 SBSQ HOSP IP/OBS MODERATE 35: CPT | Performed by: INTERNAL MEDICINE

## 2024-04-09 PROCEDURE — 80048 BASIC METABOLIC PNL TOTAL CA: CPT

## 2024-04-09 RX ADMIN — INSULIN LISPRO 1 UNITS: 100 INJECTION, SOLUTION INTRAVENOUS; SUBCUTANEOUS at 11:59

## 2024-04-09 RX ADMIN — ALLOPURINOL 100 MG: 100 TABLET ORAL at 08:01

## 2024-04-09 RX ADMIN — NIFEDIPINE 30 MG: 30 TABLET, EXTENDED RELEASE ORAL at 08:01

## 2024-04-09 RX ADMIN — SEVELAMER HYDROCHLORIDE 800 MG: 800 TABLET, FILM COATED ORAL at 08:01

## 2024-04-09 RX ADMIN — GABAPENTIN 200 MG: 100 CAPSULE ORAL at 08:01

## 2024-04-09 RX ADMIN — HEPARIN SODIUM 5000 UNITS: 5000 INJECTION, SOLUTION INTRAVENOUS; SUBCUTANEOUS at 06:01

## 2024-04-09 RX ADMIN — SEVELAMER HYDROCHLORIDE 800 MG: 800 TABLET, FILM COATED ORAL at 11:58
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
## 2024-04-09 NOTE — PLAN OF CARE
Target UF Goal 4 L as tolerated. Patient dialyzing for 4 hours on 2 K bath for serum K of  4.6  per protocol. Treatment plan reviewed with Nephrology.   Problem: METABOLIC, FLUID AND ELECTROLYTES - ADULT  Goal: Electrolytes maintained within normal limits  Description: INTERVENTIONS:  - Monitor labs and assess patient for signs and symptoms of electrolyte imbalances  - Administer electrolyte replacement as ordered  - Monitor response to electrolyte replacements, including repeat lab results as appropriate  - Instruct patient on fluid and nutrition as appropriate  Outcome: Progressing  Goal: Fluid balance maintained  Description: INTERVENTIONS:  - Monitor labs   - Monitor I/O and WT  - Instruct patient on fluid and nutrition as appropriate  - Assess for signs & symptoms of volume excess or deficit  Outcome: Progressing   Post-Dialysis RN Treatment Note    Blood Pressure:  Pre 156/68 mm/Hg  Post 140/65 mmHg   EDW  tbd kg    Weight:  Pre 111 kg   Post 107 kg   Mode of weight measurement: Standing Scale   Volume Removed  4000 ml    Treatment duration 240 minutes    NS given  No    Treatment shortened? No   Medications given during Rx None Reported   Estimated Kt/V  None Reported   Access type: Permacath/TDC   Access Issues: No    Report called to primary nurse   Yes Brigitte Emerson RN

## 2024-04-09 NOTE — WOUND OSTOMY CARE
Progress Note - Wound   Naresh Carpio 63 y.o. male MRN: [id number]  Unit/Bed#: [address] [place] [address] Encounter: [id number]      Assessment:   This is a 63 year old male patient admitted on 4/6/24 with chest pain. Consult received for foot wounds. Patient to be followed up by Podiatry.    Assessment Findings:  1-Dry intact eschar to left plantar foot with no drainage - orders in place for wound care until patient seen by Podiatry.  2-Sacrobuttocks and heels intact - orders in place for prevention.    Skin care plans:  1-Cleanse wounds to left plantar foot with NSS & pat dry. Apply betadine to dry eschar, cover with gauze, charanjit & tape. Change daily & prn (discontinue order if changed by Podiatry).  2-Hydraguard to bilateral sacrum, buttock and heels BID and PRN  3-Float heels on 2 pillows to offload pressure so heels are not in contact with mattress or pillows.  4-Ehob pressure redistribution cushion in chair when out of bed. Limit prolonged sitting.  5-Moisturize skin daily with skin nourishing cream.  6-Turn/reposition q2h or when medically stable for pressure re-distribution on skin.     Wound 08/19/23 Diabetic Ulcer Left;Plantar (Active)   Wound Image   04/08/24 1202   Wound Description Dry;Black;Eschar 04/08/24 1202   Liliana-wound Assessment Callus 04/08/24 1202   Wound Length (cm) 1.4 cm 04/08/24 1202   Wound Width (cm) 0.3 cm 04/08/24 1202   Wound Depth (cm) 0 cm 04/08/24 1202   Wound Surface Area (cm^2) 0.42 cm^2 04/08/24 1202   Wound Volume (cm^3) 0 cm^3 04/08/24 1202   Calculated Wound Volume (cm^3) 0 cm^3 04/08/24 1202   Drainage Amount None 04/08/24 1202   Non-staged Wound Description Full thickness 04/08/24 1202   Treatments Cleansed 04/08/24 1202   Dressing Gauze;Dry dressing;betadine 04/08/24 1202   Dressing Changed New 04/08/24 1202   Dressing Status Clean;Dry;Intact 04/08/24 1202   Wound 04/06/24 Pretibial Left (Active)   Wound Image   04/08/24 1202   Wound Description Intact scabs - healed 04/08/24 1202    Wound Length (cm) 0 cm 04/08/24 1202   Wound Width (cm) 0 cm 04/08/24 1202   Wound Depth (cm) 0 cm 04/08/24 1202   Wound Surface Area (cm^2) 0 cm^2 04/08/24 1202   Wound Volume (cm^3) 0 cm^3 04/08/24 1202   Calculated Wound Volume (cm^3) 0 cm^3 04/08/24 1202   Drainage Amount None 04/08/24 1202   Treatments Cleansed 04/08/24 1202   Dressing Protective barrier 04/08/24 1202   Dressing Status Intact 04/08/24 1202       Wound 04/06/24 Toe D2, second Left (Active)   Wound Image   04/08/24 1202   Wound Description Dry;Black;Eschar 04/08/24 1202   Wound Length (cm) 0.3 cm 04/08/24 1202   Wound Width (cm) 0.3 cm 04/08/24 1202   Wound Depth (cm) 0 cm 04/08/24 1202   Wound Surface Area (cm^2) 0.09 cm^2 04/08/24 1202   Wound Volume (cm^3) 0 cm^3 04/08/24 1202   Calculated Wound Volume (cm^3) 0 cm^3 04/08/24 1202   Drainage Amount None 04/08/24 1202   Non-staged Wound Description Full thickness 04/08/24 1202   Treatments Cleansed 04/08/24 1202   Dressing Betadine, gauze, charanjit & tape 04/08/24 1202   Dressing Status Intact 04/08/24 1202     Discussed assessment findings, and plan of care/recommendations with Janna RAWLS.    Wound care signing off, please call or tiger text with questions and concerns    Recommendations written as orders.  Love Andrew MSN, RN, CWON

## 2024-04-09 NOTE — ASSESSMENT & PLAN NOTE
Hypoxia present on admission evidenced by O2 sats 89% on room air on initial presentation to ED  Chest x-ray from 4/6 demonstrated vascular and interstitial prominence in the lungs with trace left pleural effusion suggesting fluid overload as per radiology report  Suspect volume overload secondary to missed dialysis sessions  Patient dialyzed on 4/6, 4/8, 4/9  Currently stable on room air
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
## 2024-04-09 NOTE — PLAN OF CARE
Problem: CARDIOVASCULAR - ADULT  Goal: Maintains optimal cardiac output and hemodynamic stability  Description: INTERVENTIONS:  - Monitor I/O, vital signs and rhythm  - Monitor for S/S and trends of decreased cardiac output  - Administer and titrate ordered vasoactive medications to optimize hemodynamic stability  - Assess quality of pulses, skin color and temperature  - Assess for signs of decreased coronary artery perfusion  - Instruct patient to report change in severity of symptoms  Outcome: Progressing  Goal: Absence of cardiac dysrhythmias or at baseline rhythm  Description: INTERVENTIONS:  - Continuous cardiac monitoring, vital signs, obtain 12 lead EKG if ordered  - Administer antiarrhythmic and heart rate control medications as ordered  - Monitor electrolytes and administer replacement therapy as ordered  Outcome: Progressing     Problem: RESPIRATORY - ADULT  Goal: Achieves optimal ventilation and oxygenation  Description: INTERVENTIONS:  - Assess for changes in respiratory status  - Assess for changes in mentation and behavior  - Position to facilitate oxygenation and minimize respiratory effort  - Oxygen administered by appropriate delivery if ordered  - Initiate smoking cessation education as indicated  - Encourage broncho-pulmonary hygiene including cough, deep breathe, Incentive Spirometry  - Assess the need for suctioning and aspirate as needed  - Assess and instruct to report SOB or any respiratory difficulty  - Respiratory Therapy support as indicated  Outcome: Progressing     Problem: METABOLIC, FLUID AND ELECTROLYTES - ADULT  Goal: Electrolytes maintained within normal limits  Description: INTERVENTIONS:  - Monitor labs and assess patient for signs and symptoms of electrolyte imbalances  - Administer electrolyte replacement as ordered  - Monitor response to electrolyte replacements, including repeat lab results as appropriate  - Instruct patient on fluid and nutrition as appropriate  Outcome:  Progressing  Goal: Fluid balance maintained  Description: INTERVENTIONS:  - Monitor labs   - Monitor I/O and WT  - Instruct patient on fluid and nutrition as appropriate  - Assess for signs & symptoms of volume excess or deficit  Outcome: Progressing  Goal: Glucose maintained within target range  Description: INTERVENTIONS:  - Monitor Blood Glucose as ordered  - Assess for signs and symptoms of hyperglycemia and hypoglycemia  - Administer ordered medications to maintain glucose within target range  - Assess nutritional intake and initiate nutrition service referral as needed  Outcome: Progressing     Problem: SKIN/TISSUE INTEGRITY - ADULT  Goal: Skin Integrity remains intact(Skin Breakdown Prevention)  Description: Assess:  -Perform Praful assessment every shift  -Clean and moisturize skin every shift  -Inspect skin when repositioning, toileting, and assisting with ADLS  -Assess under medical devices such as scd's every shift  -Assess extremities for adequate circulation and sensation     Bed Management:  -Have minimal linens on bed & keep smooth, unwrinkled  -Change linens as needed when moist or perspiring  -Avoid sitting or lying in one position for more than 2 hours while in bed  -Keep HOB at 45degrees     Toileting:  -Offer bedside commode  -Assess for incontinence every hours  -Use incontinent care products after each incontinent episode such as pt/ot    Activity:  -Mobilize patient 3 times a day  -Encourage activity and walks on unit  -Encourage or provide ROM exercises   -Turn and reposition patient every 4 Hours  -Use appropriate equipment to lift or move patient in bed  -Instruct/ Assist with weight shifting every 2 hours when out of bed in chair  -Consider limitation of chair time 2 hour intervals    Skin Care:  -Avoid use of baby powder, tape, friction and shearing, hot water or constrictive clothing  -Relieve pressure over bony prominences using allyven  -Do not massage red bony areas    Next  Steps:  -Teach patient strategies to minimize risks such as turn and reposition   -Consider consults to  interdisciplinary teams such as pt/ot  Outcome: Progressing     Problem: PAIN - ADULT  Goal: Verbalizes/displays adequate comfort level or baseline comfort level  Description: Interventions:  - Encourage patient to monitor pain and request assistance  - Assess pain using appropriate pain scale  - Administer analgesics based on type and severity of pain and evaluate response  - Implement non-pharmacological measures as appropriate and evaluate response  - Consider cultural and social influences on pain and pain management  - Notify physician/advanced practitioner if interventions unsuccessful or patient reports new pain  Outcome: Progressing     Problem: INFECTION - ADULT  Goal: Absence or prevention of progression during hospitalization  Description: INTERVENTIONS:  - Assess and monitor for signs and symptoms of infection  - Monitor lab/diagnostic results  - Monitor all insertion sites, i.e. indwelling lines, tubes, and drains  - Monitor endotracheal if appropriate and nasal secretions for changes in amount and color  - [place] appropriate cooling/warming therapies per order  - Administer medications as ordered  - Instruct and encourage patient and family to use good hand hygiene technique  - Identify and instruct in appropriate isolation precautions for identified infection/condition  Outcome: Progressing  Goal: Absence of fever/infection during neutropenic period  Description: INTERVENTIONS:  - Monitor WBC    Outcome: Progressing     Problem: SAFETY ADULT  Goal: Patient will remain free of falls  Description: INTERVENTIONS:  - Educate patient/family on patient safety including physical limitations  - Instruct patient to call for assistance with activity   - Consult OT/PT to assist with strengthening/mobility   - Keep Call bell within reach  - Keep bed low and locked with side rails adjusted as appropriate  -  Keep care items and personal belongings within reach  - Initiate and maintain comfort rounds  - Make Fall Risk Sign visible to staff  - Offer Toileting every 2 Hours, in advance of need  - Initiate/Maintain bed/chair alarm  - Obtain necessary fall risk management equipment: bed/chair alarm  - Apply yellow socks and bracelet for high fall risk patients  - Consider moving patient to room near nurses station  Outcome: Progressing  Goal: Maintain or return to baseline ADL function  Description: INTERVENTIONS:  -  Assess patient's ability to carry out ADLs; assess patient's baseline for ADL function and identify physical deficits which impact ability to perform ADLs (bathing, care of mouth/teeth, toileting, grooming, dressing, etc.)  - Assess/evaluate cause of self-care deficits   - Assess range of motion  - Assess patient's mobility; develop plan if impaired  - Assess patient's need for assistive devices and provide as appropriate  - Encourage maximum independence but intervene and supervise when necessary  - Involve family in performance of ADLs  - Assess for home care needs following discharge   - Consider OT consult to assist with ADL evaluation and planning for discharge  - Provide patient education as appropriate  Outcome: Progressing  Goal: Maintains/Returns to pre admission functional level  Description: INTERVENTIONS:  - Perform AM-PAC 6 Click Basic Mobility/ Daily Activity assessment daily.  - Set and communicate daily mobility goal to care team and patient/family/caregiver.   - Collaborate with rehabilitation services on mobility goals if consulted  - Perform Range of Motion 3 times a day.  - Reposition patient every 3 hours.  - Dangle patient 3 times a day  - Stand patient 3 times a day  - Ambulate patient 3 times a day  - Out of bed to chair 3 times a day   - Out of bed for meals 3 times a day  - Out of bed for toileting  - Record patient progress and toleration of activity level   Outcome: Progressing      Problem: DISCHARGE PLANNING  Goal: Discharge to home or other facility with appropriate resources  Description: INTERVENTIONS:  - Identify barriers to discharge w/patient and caregiver  - Arrange for needed discharge resources and transportation as appropriate  - Identify discharge learning needs (meds, wound care, etc.)  - Arrange for interpretive services to assist at discharge as needed  - Refer to Case Management Department for coordinating discharge planning if the patient needs post-hospital services based on physician/advanced practitioner order or complex needs related to functional status, cognitive ability, or social support system  Outcome: Progressing     Problem: Knowledge Deficit  Goal: Patient/family/caregiver demonstrates understanding of disease process, treatment plan, medications, and discharge instructions  Description: Complete learning assessment and assess knowledge base.  Interventions:  - Provide teaching at level of understanding  - Provide teaching via preferred learning methods  Outcome: Progressing     Problem: Nutrition/Hydration-ADULT  Goal: Nutrient/Hydration intake appropriate for improving, restoring or maintaining nutritional needs  Description: Monitor and assess patient's nutrition/hydration status for malnutrition. Collaborate with interdisciplinary team and initiate plan and interventions as ordered.  Monitor patient's weight and dietary intake as ordered or per policy. Utilize nutrition screening tool and intervene as necessary. Determine patient's food preferences and provide high-protein, high-caloric foods as appropriate.     INTERVENTIONS:  - Monitor oral intake, urinary output, labs, and treatment plans  - Assess nutrition and hydration status and recommend course of action  - Evaluate amount of meals eaten  - Assist patient with eating if necessary   - Allow adequate time for meals  - Recommend/ encourage appropriate diets, oral nutritional supplements, and  vitamin/mineral supplements  - Order, calculate, and assess calorie counts as needed  - Recommend, monitor, and adjust tube feedings and TPN/PPN based on assessed needs  - Assess need for intravenous fluids  - Provide specific nutrition/hydration education as appropriate  - Include patient/family/caregiver in decisions related to nutrition  Outcome: Progressing

## 2024-04-09 NOTE — DISCHARGE INSTR - OTHER ORDERS
Skin care plans:  1-Cleanse wounds to left plantar foot with normal saline & pat dry. Apply betadine to dry eschar, cover with gauze, charanjit & tape. Change daily & as needed (discontinue this order if changed by Podiatry).  2-Hydraguard to bilateral sacrum, buttock and heels 2x/day and as needed.  3-Float heels on 2 pillows to offload pressure so heels are not in contact with mattress or pillows.  4-Use pressure redistribution cushion in chair when out of bed. Limit prolonged sitting.  5-Moisturize skin daily with skin nourishing cream.  6-Turn/reposition every 2 hrs for pressure re-distribution on skin.

## 2024-04-09 NOTE — CASE MANAGEMENT
Case Management Discharge Planning Note    Patient name Naresh Carpio  Location [address] [address]/[address] [address]-* MRN [id number]  : 1960 Date 2024       Current Admission Date: 2024  Current Admission Diagnosis:ESRD (end stage renal disease) (Formerly [facility])   Patient Active Problem List    Diagnosis Date Noted    Difficulty with speech 2024    History of amputation of hallux (Formerly [facility]) 2024    Essential hypertension 2024    Facial cellulitis 2024    Constipation 2023    Bradycardia 2023    Cellulitis of right foot     Polymicrobial bacterial infection 2023    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 mellitus, with necrosis of bone (Formerly [facility])     Acquired deformity of foot, right     Charcot's joint     Subacute osteomyelitis of right foot (Formerly [facility])     Open wound of right foot 2023    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 mellitus, with bone involvement without evidence of necrosis (Formerly [facility])     Diabetic ulcer of left midfoot associated with type 2 diabetes mellitus, limited to breakdown of skin (Formerly [facility])     Diabetic polyneuropathy associated with type 2 diabetes mellitus (Formerly [facility])     Diabetes mellitus type 2 with peripheral artery disease (Formerly [facility])     History of amputation of lesser toe of left foot (Formerly [facility])     Onychomycosis     Status post fall 2023    Traumatic rhabdomyolysis (Formerly [facility]) 2023    Leukocytosis 2023    Osteoarthritis of left hip 2022    Severe Left Orchalgia 2022    Right shoulder pain 2022    Left hip pain 2022    Hemodialysis status (Formerly [facility])     Hypervolemia     Anemia 2022    ESRD (end stage renal disease) (Formerly [facility]) 2022    Type 2 diabetes mellitus, without long-term current use of insulin (Formerly [facility])     Hypertension     History of TIA (transient ischemic attack)     T10 vertebral fracture (Formerly [facility])       LOS (days): 2  Geometric Mean LOS (GMLOS) (days): 3.6  Days to GMLOS:1.3      OBJECTIVE:  Risk of Unplanned Readmission Score: 29.82       Current admission status: Inpatient   [facility]   [id number] - [place], [place] - [address]  [address]  [facility] [address]  Phone: [phone] Fax: [phone]    Primary Care Provider: No primary care provider on file.    Primary Insurance: MEDICARE  Secondary Insurance:     DISCHARGE DETAILS:    Discharge planning discussed with:: Patient  Freedom of Choice: Yes    Comments - Freedom of Choice: Patient's preference is to return home when medically cleared and discharge is planned for today.  Patient will need a Lyft at discharge and signed waiver was placed in scan bin.  Lyft destination is [address] [place] [address] where his car is parked.       Were Treatment Team discharge recommendations reviewed with patient/caregiver?: Yes  Did patient/caregiver verbalize understanding of patient care needs?: Yes  Were patient/caregiver advised of the risks associated with not following Treatment Team discharge recommendations?: Yes    Contacts  Patient Contacts: Mercedes Rseendez (ex-wife)  Relationship to Patient:: Other (Comment)  Contact Method: Phone  Phone Number: [phone]    Requested Home Health Care         Is the patient interested in HHC at discharge?: No    DME Referral Provided  Referral made for DME?: No    Other Referral/Resources/Interventions Provided:  Interventions: Transportation    Would you like to participate in our [facility] service program?  : No - Declined    Treatment Team Recommendation: Home  Discharge Destination Plan:: Home  Transport at Discharge : Ride Share         IMM Given (Date):: 04/07/24

## 2024-04-09 NOTE — ASSESSMENT & PLAN NOTE
Lab Results   Component Value Date    HGBA1C 5.4 03/12/2024       Recent Labs     04/08/24  1600 04/08/24 2001 04/09/24  0739 04/09/24  1124   POCGLU 150* 138 120 173*         Blood Sugar Average: Last 72 hrs:  (P) [id number]    Patient reports diet controlled not on any medications  Last hemoglobin A1c 5.4  Accu-Cheks before meals and at bedtime with sliding scale coverage

## 2024-04-10 NOTE — CONSULTS
Podiatry - Consultation    Patient Information:   Naresh Carpio 63 y.o. male MRN: [id number]  Unit/Bed#: [address] [place] [address] Encounter: [id number]  PCP: No primary care provider on file.  Date of Admission:  4/6/2024  Date of Consultation: 04/09/24  Requesting Physician: No att. providers found      ASSESSMENT:    Naresh Carpio is a 63 y.o. male with:    Onychomycosis  Bilateral plantar foot calluses    PLAN:    Toenails x10 were excisionally debrided using a large nipper to normal length and thickness without incident.  Weight bearing as tolerated  Rest of Medical care per primary team.  Calluses to the bilateral foot stable  Podiatry signing off, thank you for the consult! Please contact with any questions or concerns.      SUBJECTIVE:    History of Present Illness:    Naresh Carpio is a 63 y.o. male who is originally admitted 4/6/2024 due to cellulitis of the face with a past medical history of ESRD on hemodialysis, type 2 diabetes, hypertension, history of right partial first ray amputation.    We are consulted for painful, elongated toenails x 10. They state that they have difficulty applying their socks and shoes due to the elongation of the nails. They report pain with palpation and pressure within their shoe gear. They have been unable to cut their nails adequately. Patient has no further pedal complaints at this time.     Review of Systems:    Constitutional: Negative.    HENT: Negative.    Eyes: Negative.    Respiratory: Negative.    Cardiovascular: Negative.    Gastrointestinal: Negative.    Musculoskeletal:  history of R partial 1st ray amputation  Skin: Thickened, elongated toenails  Neurological: peripheral neuropathy  Psych: Negative.     Past Medical and Surgical History:     Past Medical History:   Diagnosis Date    CKD     Diabetes mellitus (HCC)     Hyperlipidemia     Hypertension     Left toe amputee (HCC)     TIA (transient ischemic attack)        Past Surgical History:   Procedure Laterality  "Date    AMPUTATION Left     left foot resection 10 years ago dr tran    IR LOWER EXTREMITY ANGIOGRAM  08/29/2023    IR TEMPORARY DIALYSIS CATHETER PLACEMENT  08/25/2023    IR TUNNELED CENTRAL LINE REMOVAL  08/23/2023    IR TUNNELED DIALYSIS CATHETER PLACEMENT  01/27/2022    IR TUNNELED DIALYSIS CATHETER PLACEMENT  08/30/2023    NM AMPUTATION METATARSAL W/TOE SINGLE Right 8/31/2023    Procedure: RIGHT PARTIAL 1ST RAY RESECTION WITH  WOUND VAC APPLICATION;  Surgeon: Won Parmar DPM;  Location: [facility];  Service: Podiatry       Meds/Allergies:    No medications prior to admission.       No Known Allergies    Social History:     Marital Status:     Substance Use History:   Social History     Substance and Sexual Activity   Alcohol Use Not Currently    Alcohol/week: 0.0 standard drinks of alcohol    Comment: 0     Social History     Tobacco Use   Smoking Status Never    Passive exposure: Never   Smokeless Tobacco Never     Social History     Substance and Sexual Activity   Drug Use Never       Family History:    History reviewed. No pertinent family history.      OBJECTIVE:    Vitals:   Blood Pressure: 140/65 (04/09/24 1723)  Pulse: 74 (04/09/24 1723)  Temperature: 98.3 °F (36.8 °C) (04/09/24 1715)  Temp Source: Oral (04/09/24 1715)  Respirations: 16 (04/09/24 1715)  Height: 6' 1\" (185.4 cm) (04/06/24 1054)  Weight - Scale: 111 kg (244 lb) (04/09/24 0600)  SpO2: 90 % (04/09/24 1723)    Physical Exam:    General Appearance: Alert, cooperative, no distress.  HEENT: Head normocephalic, atraumatic, without obvious abnormality.  Heart: Normal rate and rhythm.  Lungs: Non-labored breathing. No respiratory distress.  Abdomen: Without distension.  Psychiatric: AAOx3  Lower Extremity:  Vascular:   Right DP and PT pulses are absent. Left DP and PT pulses are absent. CRT < 3 seconds at the digits. +0/4 edema noted at bilateral lower extremities. Pedal hair is absent. Skin temperature is WNL " "bilaterally.    Musculoskeletal:  MMT is 4/5 in all muscle compartments bilaterally. ROM at the ankle joint is decreased bilaterally with the leg extended. No Pain on palpation of the bilateral feet. No gross deformities noted.     Dermatological:  Elongated, discolored, dystrophic nails x 10 that are positive for subungual debris.      Neurological:  Gross sensation is absent. Protective sensation is absent. Light touch sensation is absent.    Additional data:     Lab Results: I have personally reviewed pertinent labs including:    Results from last 7 days   Lab Units 04/09/24  1322 04/07/24  0457 04/06/24  0914   WBC Thousand/uL 5.20   < > 10.32*   HEMOGLOBIN g/dL 8.9*   < > 10.1*   HEMATOCRIT % 28.9*   < > 32.7*   PLATELETS Thousands/uL 253   < > 267   SEGS PCT %  --   --  71   LYMPHO PCT %  --   --  12*   MONO PCT %  --   --  8   EOS PCT %  --   --  7*    < > = values in this interval not displayed.     Results from last 7 days   Lab Units 04/09/24  1322 04/08/24  0759 04/07/24  0457   POTASSIUM mmol/L 4.3   < > 4.3   CHLORIDE mmol/L 97   < > 97   CO2 mmol/L 29   < > 24   BUN mg/dL 39*   < > 39*   CREATININE mg/dL 6.90*   < > 6.24*   CALCIUM mg/dL 7.8*   < > 7.6*   ALK PHOS U/L  --   --  99   ALT U/L  --   --  3*   AST U/L  --   --  13    < > = values in this interval not displayed.           Cultures: I have personally reviewed pertinent cultures including:              Imaging: I have personally reviewed pertinent reports in PACS.  EKG, Pathology, and Other Studies: I have personally reviewed pertinent reports.        ** Please Note: Portions of the record may have been created with voice recognition software. Occasional wrong word or \"sound a like\" substitutions may have occurred due to the inherent limitations of voice recognition software. Read the chart carefully and recognize, using context, where substitutions have occurred. **   "

## 2024-04-24 NOTE — ASSESSMENT & PLAN NOTE
58year old male with ESRD on HD, DM2, HTN, TIAs, compression fractures of C7 and T10 as well as chronic right lacunar inferior putamen infarct admitted after being found down at home. Patient has been encephalopathic and agitated while in the hospital and neurology was asked to evaluate on 8/21/2023. Etiology of symptoms is unclear, but strong suspicion for metabolic encephalopathy in setting of medical illness as well as possible infection with positive blood culture noted this AM (possible source is foot wound). Patient was calm and overall more cooperative with exam today and no focal deficit noted. Plan:   - Recommend continue to evaluate for underlying metabolic/infectious derangements as per medicine team.   - MRI brain without contrast is pending.   - MRI R foot is pending to evaluate for osteomyelitis. - Repeat Blood cultures ordered. - Monitor exam and notify with changes. show

## 2024-07-02 ENCOUNTER — APPOINTMENT (EMERGENCY)
Dept: RADIOLOGY | Facility: HOSPITAL | Age: 64
DRG: 551 | End: 2024-07-02
Payer: MEDICARE

## 2024-07-02 ENCOUNTER — HOSPITAL ENCOUNTER (INPATIENT)
Facility: HOSPITAL | Age: 64
LOS: 4 days | Discharge: NURSING, REHAB OR LONG-TERM CARE | DRG: 551 | End: 2024-07-06
Attending: EMERGENCY MEDICINE | Admitting: STUDENT IN AN ORGANIZED HEALTH CARE EDUCATION/TRAINING PROGRAM
Payer: MEDICARE

## 2024-07-02 DIAGNOSIS — I10 ESSENTIAL HYPERTENSION: ICD-10-CM

## 2024-07-02 DIAGNOSIS — S32.9XXA PELVIC FRACTURE (HCC): Primary | ICD-10-CM

## 2024-07-02 DIAGNOSIS — Z89.419 HISTORY OF AMPUTATION OF HALLUX (HCC): ICD-10-CM

## 2024-07-02 DIAGNOSIS — S32.10XA SACRAL FRACTURE, CLOSED (HCC): ICD-10-CM

## 2024-07-02 DIAGNOSIS — N18.6 ESRD (END STAGE RENAL DISEASE) (HCC): ICD-10-CM

## 2024-07-02 DIAGNOSIS — S32.9XXA: ICD-10-CM

## 2024-07-02 LAB
ALBUMIN SERPL BCG-MCNC: 4 G/DL (ref 3.5–5)
ALP SERPL-CCNC: 64 U/L (ref 34–104)
ALT SERPL W P-5'-P-CCNC: 11 U/L (ref 7–52)
ANION GAP SERPL CALCULATED.3IONS-SCNC: 18 MMOL/L (ref 4–13)
AST SERPL W P-5'-P-CCNC: 12 U/L (ref 13–39)
BASOPHILS # BLD AUTO: 0.04 THOUSANDS/ÂΜL (ref 0–0.1)
BASOPHILS NFR BLD AUTO: 1 % (ref 0–1)
BILIRUB SERPL-MCNC: 0.44 MG/DL (ref 0.2–1)
BUN SERPL-MCNC: 69 MG/DL (ref 5–25)
CALCIUM SERPL-MCNC: 8.2 MG/DL (ref 8.4–10.2)
CHLORIDE SERPL-SCNC: 92 MMOL/L (ref 96–108)
CO2 SERPL-SCNC: 20 MMOL/L (ref 21–32)
CREAT SERPL-MCNC: 11.37 MG/DL (ref 0.6–1.3)
EOSINOPHIL # BLD AUTO: 0.35 THOUSAND/ÂΜL (ref 0–0.61)
EOSINOPHIL NFR BLD AUTO: 5 % (ref 0–6)
ERYTHROCYTE [DISTWIDTH] IN BLOOD BY AUTOMATED COUNT: 15.4 % (ref 11.6–15.1)
GFR SERPL CREATININE-BSD FRML MDRD: 4 ML/MIN/1.73SQ M
GLUCOSE SERPL-MCNC: 115 MG/DL (ref 65–140)
HCT VFR BLD AUTO: 43.2 % (ref 36.5–49.3)
HGB BLD-MCNC: 14.1 G/DL (ref 12–17)
IMM GRANULOCYTES # BLD AUTO: 0.07 THOUSAND/UL (ref 0–0.2)
IMM GRANULOCYTES NFR BLD AUTO: 1 % (ref 0–2)
LYMPHOCYTES # BLD AUTO: 1.3 THOUSANDS/ÂΜL (ref 0.6–4.47)
LYMPHOCYTES NFR BLD AUTO: 18 % (ref 14–44)
MAGNESIUM SERPL-MCNC: 2.1 MG/DL (ref 1.9–2.7)
MCH RBC QN AUTO: 29 PG (ref 26.8–34.3)
MCHC RBC AUTO-ENTMCNC: 32.6 G/DL (ref 31.4–37.4)
MCV RBC AUTO: 89 FL (ref 82–98)
MONOCYTES # BLD AUTO: 0.67 THOUSAND/ÂΜL (ref 0.17–1.22)
MONOCYTES NFR BLD AUTO: 9 % (ref 4–12)
NEUTROPHILS # BLD AUTO: 4.92 THOUSANDS/ÂΜL (ref 1.85–7.62)
NEUTS SEG NFR BLD AUTO: 66 % (ref 43–75)
NRBC BLD AUTO-RTO: 0 /100 WBCS
PLATELET # BLD AUTO: 205 THOUSANDS/UL (ref 149–390)
PMV BLD AUTO: 10.7 FL (ref 8.9–12.7)
POTASSIUM SERPL-SCNC: 4.5 MMOL/L (ref 3.5–5.3)
PROT SERPL-MCNC: 7 G/DL (ref 6.4–8.4)
RBC # BLD AUTO: 4.87 MILLION/UL (ref 3.88–5.62)
SODIUM SERPL-SCNC: 130 MMOL/L (ref 135–147)
WBC # BLD AUTO: 7.35 THOUSAND/UL (ref 4.31–10.16)

## 2024-07-02 PROCEDURE — 85025 COMPLETE CBC W/AUTO DIFF WBC: CPT | Performed by: EMERGENCY MEDICINE

## 2024-07-02 PROCEDURE — 83036 HEMOGLOBIN GLYCOSYLATED A1C: CPT | Performed by: PHYSICIAN ASSISTANT

## 2024-07-02 PROCEDURE — 72192 CT PELVIS W/O DYE: CPT

## 2024-07-02 PROCEDURE — 83735 ASSAY OF MAGNESIUM: CPT | Performed by: EMERGENCY MEDICINE

## 2024-07-02 PROCEDURE — 99222 1ST HOSP IP/OBS MODERATE 55: CPT | Performed by: PHYSICIAN ASSISTANT

## 2024-07-02 PROCEDURE — 96372 THER/PROPH/DIAG INJ SC/IM: CPT

## 2024-07-02 PROCEDURE — 80053 COMPREHEN METABOLIC PANEL: CPT | Performed by: EMERGENCY MEDICINE

## 2024-07-02 PROCEDURE — 82948 REAGENT STRIP/BLOOD GLUCOSE: CPT

## 2024-07-02 PROCEDURE — 73502 X-RAY EXAM HIP UNI 2-3 VIEWS: CPT

## 2024-07-02 PROCEDURE — 96374 THER/PROPH/DIAG INJ IV PUSH: CPT

## 2024-07-02 PROCEDURE — 99285 EMERGENCY DEPT VISIT HI MDM: CPT | Performed by: EMERGENCY MEDICINE

## 2024-07-02 PROCEDURE — 36415 COLL VENOUS BLD VENIPUNCTURE: CPT | Performed by: EMERGENCY MEDICINE

## 2024-07-02 PROCEDURE — 99284 EMERGENCY DEPT VISIT MOD MDM: CPT

## 2024-07-02 RX ORDER — INSULIN LISPRO 100 [IU]/ML
1-6 INJECTION, SOLUTION INTRAVENOUS; SUBCUTANEOUS
Status: DISCONTINUED | OUTPATIENT
Start: 2024-07-02 | End: 2024-07-06 | Stop reason: HOSPADM

## 2024-07-02 RX ORDER — GABAPENTIN 100 MG/1
200 CAPSULE ORAL 2 TIMES DAILY
Status: DISCONTINUED | OUTPATIENT
Start: 2024-07-02 | End: 2024-07-06 | Stop reason: HOSPADM

## 2024-07-02 RX ORDER — INSULIN LISPRO 100 [IU]/ML
1-6 INJECTION, SOLUTION INTRAVENOUS; SUBCUTANEOUS
Status: DISCONTINUED | OUTPATIENT
Start: 2024-07-03 | End: 2024-07-06 | Stop reason: HOSPADM

## 2024-07-02 RX ORDER — HEPARIN SODIUM 5000 [USP'U]/ML
5000 INJECTION, SOLUTION INTRAVENOUS; SUBCUTANEOUS EVERY 8 HOURS SCHEDULED
Status: DISCONTINUED | OUTPATIENT
Start: 2024-07-02 | End: 2024-07-06

## 2024-07-02 RX ORDER — ATORVASTATIN CALCIUM 80 MG/1
80 TABLET, FILM COATED ORAL
Status: DISCONTINUED | OUTPATIENT
Start: 2024-07-03 | End: 2024-07-06 | Stop reason: HOSPADM

## 2024-07-02 RX ORDER — HYDROMORPHONE HCL/PF 1 MG/ML
1 SYRINGE (ML) INJECTION ONCE
Status: COMPLETED | OUTPATIENT
Start: 2024-07-02 | End: 2024-07-02

## 2024-07-02 RX ORDER — SEVELAMER HYDROCHLORIDE 800 MG/1
800 TABLET, FILM COATED ORAL
Status: DISCONTINUED | OUTPATIENT
Start: 2024-07-03 | End: 2024-07-06 | Stop reason: HOSPADM

## 2024-07-02 RX ORDER — NIFEDIPINE 30 MG/1
30 TABLET, EXTENDED RELEASE ORAL
Status: DISCONTINUED | OUTPATIENT
Start: 2024-07-05 | End: 2024-07-03

## 2024-07-02 RX ORDER — ACETAMINOPHEN 325 MG/1
650 TABLET ORAL EVERY 6 HOURS PRN
Status: DISCONTINUED | OUTPATIENT
Start: 2024-07-02 | End: 2024-07-06 | Stop reason: HOSPADM

## 2024-07-02 RX ORDER — HYDROMORPHONE HCL/PF 1 MG/ML
0.5 SYRINGE (ML) INJECTION EVERY 4 HOURS PRN
Status: DISCONTINUED | OUTPATIENT
Start: 2024-07-02 | End: 2024-07-04

## 2024-07-02 RX ORDER — OXYCODONE HYDROCHLORIDE 5 MG/1
5 TABLET ORAL EVERY 6 HOURS PRN
Status: DISCONTINUED | OUTPATIENT
Start: 2024-07-02 | End: 2024-07-03

## 2024-07-02 RX ORDER — ALLOPURINOL 100 MG/1
100 TABLET ORAL DAILY
Status: DISCONTINUED | OUTPATIENT
Start: 2024-07-03 | End: 2024-07-06 | Stop reason: HOSPADM

## 2024-07-02 RX ORDER — HYDROMORPHONE HCL/PF 1 MG/ML
0.5 SYRINGE (ML) INJECTION ONCE
Status: COMPLETED | OUTPATIENT
Start: 2024-07-02 | End: 2024-07-02

## 2024-07-02 RX ORDER — NIFEDIPINE 30 MG/1
30 TABLET, EXTENDED RELEASE ORAL ONCE
Status: COMPLETED | OUTPATIENT
Start: 2024-07-03 | End: 2024-07-03

## 2024-07-02 RX ORDER — POLYETHYLENE GLYCOL 3350 17 G/17G
17 POWDER, FOR SOLUTION ORAL DAILY PRN
Status: DISCONTINUED | OUTPATIENT
Start: 2024-07-02 | End: 2024-07-06 | Stop reason: HOSPADM

## 2024-07-02 RX ORDER — NIFEDIPINE 30 MG/1
30 TABLET, EXTENDED RELEASE ORAL
Status: DISCONTINUED | OUTPATIENT
Start: 2024-07-04 | End: 2024-07-03

## 2024-07-02 RX ORDER — DOCUSATE SODIUM 100 MG/1
100 CAPSULE, LIQUID FILLED ORAL 2 TIMES DAILY
Status: DISCONTINUED | OUTPATIENT
Start: 2024-07-02 | End: 2024-07-06 | Stop reason: HOSPADM

## 2024-07-02 RX ADMIN — HYDROMORPHONE HYDROCHLORIDE 1 MG: 1 INJECTION, SOLUTION INTRAMUSCULAR; INTRAVENOUS; SUBCUTANEOUS at 21:01

## 2024-07-02 RX ADMIN — GABAPENTIN 200 MG: 100 CAPSULE ORAL at 23:52

## 2024-07-02 RX ADMIN — HYDROMORPHONE HYDROCHLORIDE 0.5 MG: 1 INJECTION, SOLUTION INTRAMUSCULAR; INTRAVENOUS; SUBCUTANEOUS at 18:51

## 2024-07-02 RX ADMIN — DOCUSATE SODIUM 100 MG: 100 CAPSULE, LIQUID FILLED ORAL at 23:52

## 2024-07-02 RX ADMIN — OXYCODONE HYDROCHLORIDE 5 MG: 5 TABLET ORAL at 23:47

## 2024-07-02 RX ADMIN — HEPARIN SODIUM 5000 UNITS: 5000 INJECTION, SOLUTION INTRAVENOUS; SUBCUTANEOUS at 23:52

## 2024-07-02 NOTE — ED PROVIDER NOTES
History  Chief Complaint   Patient presents with    Fall     Pt arrived EMS. Pt reports he was attempting to pull mirror off care and slide causing him to land on his buttocks. Pt reports no LOC, no thinner, and no head strike. Pt c/o right sided hip pain.      Patient is a 63-year-old male with a history of ESRD on HD, diabetes, hyperlipidemia, prior TIA that presents to the emergency department after mechanical fall.  Patient states that he was working on his car, attempting to pull of a mirror when he lost his balance and fell onto his right buttock.  He complains of worsening pain with weightbearing on right lower extremity.  Patient also states that he has not been to dialysis since last Thursday, as he did not feel well.  Patient was scheduled for an emergent appointment tomorrow morning, but is concerned that he will not be able to make it due to significant pain.  He denies chest pain, shortness of breath      History provided by:  Patient   used: No    Fall  Associated symptoms: no abdominal pain, no back pain, no chest pain, no nausea and no vomiting        Prior to Admission Medications   Prescriptions Last Dose Informant Patient Reported? Taking?   NIFEdipine ER (ADALAT CC) 30 MG 24 hr tablet Not Taking  No No   Sig: Take 30 mg twice a day on Mon, Wed, Fri, Sun and take 30 mg once daily on Tue, Thurs, Sat   Patient not taking: Reported on 7/3/2024   acetaminophen (TYLENOL) 325 mg tablet Not Taking  No No   Sig: Take 2 tablets (650 mg total) by mouth every 6 (six) hours as needed for mild pain, moderate pain, severe pain, headaches or fever   Patient not taking: Reported on 7/3/2024   allopurinol (ZYLOPRIM) 100 mg tablet Past Month  No Yes   Sig: Take 1 tablet (100 mg total) by mouth daily   atorvastatin (LIPITOR) 80 mg tablet   Yes No   Sig: Take 80 mg by mouth daily   docusate sodium (COLACE) 100 mg capsule 7/2/2024  No Yes   Sig: Take 1 capsule (100 mg total) by mouth 2 (two) times  a day   gabapentin (NEURONTIN) 100 mg capsule   No No   Sig: Take 2 capsules (200 mg total) by mouth 2 (two) times a day   mupirocin (BACTROBAN) 2 % ointment   No No   Sig: Apply topically 3 (three) times a day   naloxone (NARCAN) 4 mg/0.1 mL nasal spray   No No   Sig: Administer 1 spray into a nostril. If no response after 2-3 minutes, give another dose in the other nostril using a new spray.   polyethylene glycol (MIRALAX) 17 g packet Not Taking  No No   Sig: Take 17 g by mouth daily Do not start before September 7, 2023.   Patient not taking: Reported on 7/3/2024   saccharomyces boulardii (FLORASTOR) 250 mg capsule Not Taking  No No   Sig: Take 1 capsule (250 mg total) by mouth 2 (two) times a day   Patient not taking: Reported on 7/3/2024   sevelamer (RENAGEL) 800 mg tablet Not Taking  No No   Sig: Take 1 tablet (800 mg total) by mouth 3 (three) times a day with meals   Patient not taking: Reported on 7/3/2024      Facility-Administered Medications: None       Past Medical History:   Diagnosis Date    CKD     Diabetes mellitus (HCC)     Hyperlipidemia     Hypertension     Left toe amputee (HCC)     TIA (transient ischemic attack)        Past Surgical History:   Procedure Laterality Date    AMPUTATION Left     left foot resection 10 years ago dr tran    IR LOWER EXTREMITY ANGIOGRAM  08/29/2023    IR TEMPORARY DIALYSIS CATHETER PLACEMENT  08/25/2023    IR TUNNELED CENTRAL LINE REMOVAL  08/23/2023    IR TUNNELED DIALYSIS CATHETER PLACEMENT  01/27/2022    IR TUNNELED DIALYSIS CATHETER PLACEMENT  08/30/2023    [place] AMPUTATION METATARSAL W/TOE SINGLE Right 8/31/2023    Procedure: RIGHT PARTIAL 1ST RAY RESECTION WITH  WOUND VAC APPLICATION;  Surgeon: Won Parmar DPM;  Location: [facility];  Service: Podiatry       History reviewed. No pertinent family history.  I have reviewed and agree with the history as documented.    E-Cigarette/Vaping    E-Cigarette Use Never User      E-Cigarette/Vaping Substances    Nicotine  No     THC No     CBD No     Flavoring No     Other No     Unknown No      Social History     Tobacco Use    Smoking status: Never     Passive exposure: Never    Smokeless tobacco: Never   Vaping Use    Vaping status: Never Used   Substance Use Topics    Alcohol use: Not Currently     Alcohol/week: 0.0 standard drinks of alcohol     Comment: 0    Drug use: Never       Review of Systems   Constitutional:  Negative for chills and fever.   Respiratory:  Negative for cough, shortness of breath and wheezing.    Cardiovascular:  Negative for chest pain and palpitations.   Gastrointestinal:  Negative for abdominal pain, constipation, diarrhea, nausea and vomiting.   Genitourinary:  Negative for dysuria, flank pain, hematuria and urgency.   Musculoskeletal:  Positive for gait problem. Negative for back pain.   Skin:  Negative for color change and rash.   All other systems reviewed and are negative.      Physical Exam  Physical Exam  Vitals and nursing note reviewed.   Constitutional:       Appearance: He is well-developed.   HENT:      Head: Normocephalic and atraumatic.   Eyes:      Pupils: Pupils are equal, round, and reactive to light.   Cardiovascular:      Rate and Rhythm: Normal rate and regular rhythm.      Heart sounds: Normal heart sounds.   Pulmonary:      Effort: Pulmonary effort is normal.      Breath sounds: Normal breath sounds.   Abdominal:      General: Bowel sounds are normal. There is no distension.      Palpations: Abdomen is soft. There is no mass.      Tenderness: There is no abdominal tenderness. There is no guarding or rebound.   Musculoskeletal:         General: Tenderness present. No swelling, deformity or signs of injury.      Cervical back: No bony tenderness.      Thoracic back: No bony tenderness.      Lumbar back: No tenderness or bony tenderness.      Right hip: Tenderness and bony tenderness present. Decreased range of motion.      Left hip: Normal.      Right upper leg: Normal.      Left  upper leg: Normal.      Right knee: Normal.      Left knee: Normal.      Right lower leg: Normal.      Left lower leg: Normal.      Right ankle: Normal.      Left ankle: Normal.   Skin:     General: Skin is warm and dry.   Neurological:      Mental Status: He is alert and oriented to person, place, and time.   Psychiatric:         Behavior: Behavior normal.         Thought Content: Thought content normal.         Judgment: Judgment normal.         Vital Signs  ED Triage Vitals [07/02/24 1756]   Temperature Pulse Respirations Blood Pressure SpO2   (!) 96.2 °F (35.7 °C) 59 18 (!) 171/69 98 %      Temp Source Heart Rate Source Patient Position - Orthostatic VS BP Location FiO2 (%)   Oral Monitor Sitting Left arm --      Pain Score       8           Vitals:    07/02/24 1756 07/02/24 2228 07/02/24 2356 07/03/24 0021   BP: (!) 171/69 (!) 195/82 (!) 191/81 162/85   Pulse: 59 67 70 66   Patient Position - Orthostatic VS: Sitting Lying           Visual Acuity  Visual Acuity      Flowsheet Row Most Recent Value   L Pupil Size (mm) 3   R Pupil Size (mm) 3            ED Medications  Medications   allopurinol (ZYLOPRIM) tablet 100 mg (has no administration in time range)   atorvastatin (LIPITOR) tablet 80 mg (has no administration in time range)   docusate sodium (COLACE) capsule 100 mg (100 mg Oral Given 7/2/24 2352)   gabapentin (NEURONTIN) capsule 200 mg (200 mg Oral Given 7/2/24 2352)   NIFEdipine (PROCARDIA XL) 24 hr tablet 30 mg (has no administration in time range)   NIFEdipine (PROCARDIA XL) 24 hr tablet 30 mg (has no administration in time range)   NIFEdipine (PROCARDIA XL) 24 hr tablet 30 mg (has no administration in time range)   sevelamer (RENAGEL) tablet 800 mg (has no administration in time range)   acetaminophen (TYLENOL) tablet 650 mg (has no administration in time range)   heparin (porcine) subcutaneous injection 5,000 Units (5,000 Units Subcutaneous Given 7/2/24 2352)   insulin lispro (HumALOG/ADMELOG) 100  units/mL subcutaneous injection 1-6 Units (has no administration in time range)   insulin lispro (HumALOG/ADMELOG) 100 units/mL subcutaneous injection 1-6 Units ( Subcutaneous Not Given 7/3/24 0000)   oxyCODONE (ROXICODONE) IR tablet 5 mg (5 mg Oral Given 7/2/24 2347)   HYDROmorphone (DILAUDID) injection 0.5 mg (has no administration in time range)   polyethylene glycol (MIRALAX) packet 17 g (has no administration in time range)   HYDROmorphone (DILAUDID) injection 0.5 mg (0.5 mg Intramuscular Given 7/2/24 1851)   HYDROmorphone (DILAUDID) injection 1 mg (1 mg Intravenous Given 7/2/24 2101)       Diagnostic Studies  Results Reviewed       Procedure Component Value Units Date/Time    Hemoglobin and hematocrit, blood [[id number]] Collected: 07/03/24 0137    Lab Status: In process Specimen: Blood from Arm, Right Updated: 07/03/24 0139    Hemoglobin A1c w/EAG Estimation (Prechecked if no A1C within 90 days) [[id number]] Collected: 07/02/24 1804    Lab Status: In process Specimen: Blood from Arm, Left Updated: 07/02/24 2229    Comprehensive metabolic panel [[id number]]  (Abnormal) Collected: 07/02/24 1804    Lab Status: Final result Specimen: Blood from Arm, Left Updated: 07/02/24 1826     Sodium 130 mmol/L      Potassium 4.5 mmol/L      Chloride 92 mmol/L      CO2 20 mmol/L      ANION GAP 18 mmol/L      BUN 69 mg/dL      Creatinine 11.37 mg/dL      Glucose 115 mg/dL      Calcium 8.2 mg/dL      AST 12 U/L      ALT 11 U/L      Alkaline Phosphatase 64 U/L      Total Protein 7.0 g/dL      Albumin 4.0 g/dL      Total Bilirubin 0.44 mg/dL      eGFR 4 ml/min/1.73sq m     Narrative:      National Kid[employer] Disease Foundation guidelines for Chronic Kidney Disease (CKD):     Stage 1 with normal or high GFR (GFR > 90 mL/min/1.73 square meters)    Stage 2 Mild CKD (GFR = 60-89 mL/min/1.73 square meters)    Stage 3A Moderate CKD (GFR = 45-59 mL/min/1.73 square meters)    Stage 3B Moderate CKD (GFR = 30-44 mL/min/1.73 square meters)     Stage 4 Severe CKD (GFR = 15-29 mL/min/1.73 square meters)    Stage 5 End Stage CKD (GFR <15 mL/min/1.73 square meters)  Note: GFR calculation is accurate only with a steady state creatinine    Magnesium [[id number]]  (Normal) Collected: 07/02/24 1804    Lab Status: Final result Specimen: Blood from Arm, Left Updated: 07/02/24 1826     Magnesium 2.1 mg/dL     CBC and differential [[id number]]  (Abnormal) Collected: 07/02/24 1804    Lab Status: Final result Specimen: Blood from Arm, Left Updated: 07/02/24 1810     WBC 7.35 Thousand/uL      RBC 4.87 Million/uL      Hemoglobin 14.1 g/dL      Hematocrit 43.2 %      MCV 89 fL      MCH 29.0 pg      MCHC 32.6 g/dL      RDW 15.4 %      MPV 10.7 fL      Platelets 205 Thousands/uL      nRBC 0 /100 WBCs      Segmented % 66 %      Immature Grans % 1 %      Lymphocytes % 18 %      Monocytes % 9 %      Eosinophils Relative 5 %      Basophils Relative 1 %      Absolute Neutrophils 4.92 Thousands/µL      Absolute Immature Grans 0.07 Thousand/uL      Absolute Lymphocytes 1.30 Thousands/µL      Absolute Monocytes 0.67 Thousand/µL      Eosinophils Absolute 0.35 Thousand/µL      Basophils Absolute 0.04 Thousands/µL                    CT pelvis wo contrast   Final Result by Ivan Verduzco MD (07/02 2047)      Right pelvic and bilateral sacral fractures as described.      The study was marked in EPIC for immediate notification.      Workstation performed: [id number]         XR hip/pelv 2-3 vws right if performed    (Results Pending)              Procedures  Procedures         ED Course                               SBIRT 22yo+      Flowsheet Row Most Recent Value   Initial Alcohol Screen: US AUDIT-C     1. How often do you have a drink containing alcohol? 0 Filed at: 07/02/2024 1758   2. How many drinks containing alcohol do you have on a typical day you are drinking?  0 Filed at: 07/02/2024 1758   3a. Male UNDER 65: How often do you have five or more drinks on one occasion? 0  Filed at: 07/02/2024 1758   3b. FEMALE Any Age, or MALE 65+: How often do you have 4 or more drinks on one occassion? 0 Filed at: 07/02/2024 1758   Audit-C Score 0 Filed at: 07/02/2024 1758   NICOLÁS: How many times in the past year have you...    Used an illegal drug or used a prescription medication for non-medical reasons? Never Filed at: 07/02/2024 1758                      Medical Decision Making  63-year-old male in the ED after mechanical fall with complaint of right hip pain.  Patient also noncompliant with dialysis and has missed multiple sessions.   X-ray ordered and reviewed.  Patient's pain persists despite IV Dilaudid x 1.  CT pelvis ordered and reviewed.  CT positive for acute pelvic and bilateral sacral fractures.  I reviewed patient with orthopedic surgeon on-call, Dr. Garcia.  He states that patient is stable to be admitted to [facility] for nonoperative treatment at this time.  Will admit patient to hospitalist.    Amount and/or Complexity of Data Reviewed  Labs: ordered.  Radiology: ordered.    Risk  Prescription drug management.  Decision regarding hospitalization.             Disposition  Final diagnoses:   Pelvic fracture (HCC)   Sacral fracture, closed (HCC)     Time reflects when diagnosis was documented in both MDM as applicable and the Disposition within this note       Time User Action Codes Description Comment    7/2/2024  9:59 PM Oyesanmi, Olubusola O Add [S32.9XXA] Pelvic fracture (HCC)     7/2/2024 10:00 PM Oyesanmi, Olubusola O Add [S32.10XA] Sacral fracture, closed (HCC)     7/2/2024 10:21 PM Niharika Russell Add [N18.6] ESRD (end stage renal disease) (HCC)           ED Disposition       ED Disposition   Admit    Condition   Stable    Date/Time   Tue Jul 2, 2024 5082    Comment   Case was discussed with Dr Garcia and Niharika BYRD and the patient's admission status was agreed to be Admission Status: inpatient status to the service of Dr. Bernard .               Follow-up Information     None         Current Discharge Medication List        CONTINUE these medications which have NOT CHANGED    Details   allopurinol (ZYLOPRIM) 100 mg tablet Take 1 tablet (100 mg total) by mouth daily  Qty: 30 tablet, Refills: 0    Associated Diagnoses: Gout      docusate sodium (COLACE) 100 mg capsule Take 1 capsule (100 mg total) by mouth 2 (two) times a day  Qty: 60 capsule, Refills: 0    Associated Diagnoses: Constipation      acetaminophen (TYLENOL) 325 mg tablet Take 2 tablets (650 mg total) by mouth every 6 (six) hours as needed for mild pain, moderate pain, severe pain, headaches or fever    Associated Diagnoses: T10 vertebral fracture (HCC)      atorvastatin (LIPITOR) 80 mg tablet Take 80 mg by mouth daily      gabapentin (NEURONTIN) 100 mg capsule Take 2 capsules (200 mg total) by mouth 2 (two) times a day  Qty: 120 capsule, Refills: 0    Associated Diagnoses: Diabetic polyneuropathy associated with type 2 diabetes mellitus (HCC)      mupirocin (BACTROBAN) 2 % ointment Apply topically 3 (three) times a day    Associated Diagnoses: Facial cellulitis      naloxone (NARCAN) 4 mg/0.1 mL nasal spray Administer 1 spray into a nostril. If no response after 2-3 minutes, give another dose in the other nostril using a new spray.  Qty: 1 each, Refills: 0    Associated Diagnoses: Left hip pain      NIFEdipine ER (ADALAT CC) 30 MG 24 hr tablet Take 30 mg twice a day on Mon, Wed, Fri, Sun and take 30 mg once daily on Tue, Thurs, Sat    Associated Diagnoses: Essential hypertension      polyethylene glycol (MIRALAX) 17 g packet Take 17 g by mouth daily Do not start before September 7, 2023.  Refills: 0    Associated Diagnoses: Constipation      saccharomyces boulardii (FLORASTOR) 250 mg capsule Take 1 capsule (250 mg total) by mouth 2 (two) times a day  Qty: 30 capsule, Refills: 0    Associated Diagnoses: Facial cellulitis      sevelamer (RENAGEL) 800 mg tablet Take 1 tablet (800 mg total) by mouth 3 (three) times a day  with meals  Refills: 0    Associated Diagnoses: ESRD (end stage renal disease) (HCC)             No discharge procedures on file.    PDMP Review         Value Time User    PDMP Reviewed  Yes 3/22/2024  3:06 PM Lore Silver DO            ED Provider  Electronically Signed by             Jinny Loving DO  07/03/24 0141

## 2024-07-03 ENCOUNTER — APPOINTMENT (INPATIENT)
Dept: DIALYSIS | Facility: HOSPITAL | Age: 64
DRG: 551 | End: 2024-07-03
Payer: MEDICARE

## 2024-07-03 PROBLEM — S32.9XXA CLOSED FRACTURE OF RIGHT PELVIS (HCC): Status: ACTIVE | Noted: 2024-07-03

## 2024-07-03 PROBLEM — S32.10XA SACRAL FRACTURE, CLOSED (HCC): Status: ACTIVE | Noted: 2024-07-03

## 2024-07-03 LAB
ANION GAP SERPL CALCULATED.3IONS-SCNC: 16 MMOL/L (ref 4–13)
BASOPHILS # BLD AUTO: 0.04 THOUSANDS/ÂΜL (ref 0–0.1)
BASOPHILS NFR BLD AUTO: 1 % (ref 0–1)
BUN SERPL-MCNC: 72 MG/DL (ref 5–25)
CALCIUM SERPL-MCNC: 8.1 MG/DL (ref 8.4–10.2)
CHLORIDE SERPL-SCNC: 90 MMOL/L (ref 96–108)
CO2 SERPL-SCNC: 26 MMOL/L (ref 21–32)
CREAT SERPL-MCNC: 11.51 MG/DL (ref 0.6–1.3)
EOSINOPHIL # BLD AUTO: 0.16 THOUSAND/ÂΜL (ref 0–0.61)
EOSINOPHIL NFR BLD AUTO: 2 % (ref 0–6)
ERYTHROCYTE [DISTWIDTH] IN BLOOD BY AUTOMATED COUNT: 15.6 % (ref 11.6–15.1)
EST. AVERAGE GLUCOSE BLD GHB EST-MCNC: 131 MG/DL
GFR SERPL CREATININE-BSD FRML MDRD: 4 ML/MIN/1.73SQ M
GLUCOSE SERPL-MCNC: 112 MG/DL (ref 65–140)
GLUCOSE SERPL-MCNC: 115 MG/DL (ref 65–140)
GLUCOSE SERPL-MCNC: 135 MG/DL (ref 65–140)
GLUCOSE SERPL-MCNC: 198 MG/DL (ref 65–140)
GLUCOSE SERPL-MCNC: 88 MG/DL (ref 65–140)
GLUCOSE SERPL-MCNC: 97 MG/DL (ref 65–140)
HBA1C MFR BLD: 6.2 %
HCT VFR BLD AUTO: 41.2 % (ref 36.5–49.3)
HCT VFR BLD AUTO: 41.7 % (ref 36.5–49.3)
HGB BLD-MCNC: 13.5 G/DL (ref 12–17)
HGB BLD-MCNC: 13.6 G/DL (ref 12–17)
IMM GRANULOCYTES # BLD AUTO: 0.03 THOUSAND/UL (ref 0–0.2)
IMM GRANULOCYTES NFR BLD AUTO: 0 % (ref 0–2)
LYMPHOCYTES # BLD AUTO: 1.2 THOUSANDS/ÂΜL (ref 0.6–4.47)
LYMPHOCYTES NFR BLD AUTO: 16 % (ref 14–44)
MAGNESIUM SERPL-MCNC: 2.2 MG/DL (ref 1.9–2.7)
MCH RBC QN AUTO: 29.5 PG (ref 26.8–34.3)
MCHC RBC AUTO-ENTMCNC: 32.8 G/DL (ref 31.4–37.4)
MCV RBC AUTO: 90 FL (ref 82–98)
MONOCYTES # BLD AUTO: 0.69 THOUSAND/ÂΜL (ref 0.17–1.22)
MONOCYTES NFR BLD AUTO: 9 % (ref 4–12)
NEUTROPHILS # BLD AUTO: 5.56 THOUSANDS/ÂΜL (ref 1.85–7.62)
NEUTS SEG NFR BLD AUTO: 72 % (ref 43–75)
NRBC BLD AUTO-RTO: 0 /100 WBCS
PHOSPHATE SERPL-MCNC: 7.6 MG/DL (ref 2.3–4.1)
PLATELET # BLD AUTO: 205 THOUSANDS/UL (ref 149–390)
PMV BLD AUTO: 10.4 FL (ref 8.9–12.7)
POTASSIUM SERPL-SCNC: 4.5 MMOL/L (ref 3.5–5.3)
RBC # BLD AUTO: 4.57 MILLION/UL (ref 3.88–5.62)
SODIUM SERPL-SCNC: 132 MMOL/L (ref 135–147)
WBC # BLD AUTO: 7.68 THOUSAND/UL (ref 4.31–10.16)

## 2024-07-03 PROCEDURE — 83735 ASSAY OF MAGNESIUM: CPT | Performed by: PHYSICIAN ASSISTANT

## 2024-07-03 PROCEDURE — 99223 1ST HOSP IP/OBS HIGH 75: CPT | Performed by: ORTHOPAEDIC SURGERY

## 2024-07-03 PROCEDURE — 85025 COMPLETE CBC W/AUTO DIFF WBC: CPT | Performed by: PHYSICIAN ASSISTANT

## 2024-07-03 PROCEDURE — 97167 OT EVAL HIGH COMPLEX 60 MIN: CPT

## 2024-07-03 PROCEDURE — 82948 REAGENT STRIP/BLOOD GLUCOSE: CPT

## 2024-07-03 PROCEDURE — 85014 HEMATOCRIT: CPT | Performed by: PHYSICIAN ASSISTANT

## 2024-07-03 PROCEDURE — 11056 PARNG/CUTG B9 HYPRKR LES 2-4: CPT | Performed by: STUDENT IN AN ORGANIZED HEALTH CARE EDUCATION/TRAINING PROGRAM

## 2024-07-03 PROCEDURE — 87081 CULTURE SCREEN ONLY: CPT | Performed by: STUDENT IN AN ORGANIZED HEALTH CARE EDUCATION/TRAINING PROGRAM

## 2024-07-03 PROCEDURE — 99222 1ST HOSP IP/OBS MODERATE 55: CPT | Performed by: INTERNAL MEDICINE

## 2024-07-03 PROCEDURE — 97163 PT EVAL HIGH COMPLEX 45 MIN: CPT

## 2024-07-03 PROCEDURE — 85018 HEMOGLOBIN: CPT | Performed by: PHYSICIAN ASSISTANT

## 2024-07-03 PROCEDURE — 99232 SBSQ HOSP IP/OBS MODERATE 35: CPT | Performed by: STUDENT IN AN ORGANIZED HEALTH CARE EDUCATION/TRAINING PROGRAM

## 2024-07-03 PROCEDURE — 80048 BASIC METABOLIC PNL TOTAL CA: CPT | Performed by: PHYSICIAN ASSISTANT

## 2024-07-03 PROCEDURE — 84100 ASSAY OF PHOSPHORUS: CPT | Performed by: PHYSICIAN ASSISTANT

## 2024-07-03 PROCEDURE — 5A1D70Z PERFORMANCE OF URINARY FILTRATION, INTERMITTENT, LESS THAN 6 HOURS PER DAY: ICD-10-PCS | Performed by: INTERNAL MEDICINE

## 2024-07-03 PROCEDURE — 99221 1ST HOSP IP/OBS SF/LOW 40: CPT | Performed by: STUDENT IN AN ORGANIZED HEALTH CARE EDUCATION/TRAINING PROGRAM

## 2024-07-03 RX ORDER — HYDROMORPHONE HCL/PF 1 MG/ML
1 SYRINGE (ML) INJECTION ONCE
Status: DISCONTINUED | OUTPATIENT
Start: 2024-07-03 | End: 2024-07-03

## 2024-07-03 RX ORDER — OXYCODONE HYDROCHLORIDE 5 MG/1
5 TABLET ORAL EVERY 6 HOURS
Status: COMPLETED | OUTPATIENT
Start: 2024-07-03 | End: 2024-07-04

## 2024-07-03 RX ORDER — NIFEDIPINE 30 MG/1
30 TABLET, EXTENDED RELEASE ORAL
Status: DISCONTINUED | OUTPATIENT
Start: 2024-07-03 | End: 2024-07-06 | Stop reason: HOSPADM

## 2024-07-03 RX ADMIN — SEVELAMER HYDROCHLORIDE 800 MG: 800 TABLET, FILM COATED PARENTERAL at 11:44

## 2024-07-03 RX ADMIN — HEPARIN SODIUM 5000 UNITS: 5000 INJECTION, SOLUTION INTRAVENOUS; SUBCUTANEOUS at 22:11

## 2024-07-03 RX ADMIN — GABAPENTIN 200 MG: 100 CAPSULE ORAL at 17:53

## 2024-07-03 RX ADMIN — OXYCODONE HYDROCHLORIDE 5 MG: 5 TABLET ORAL at 05:48

## 2024-07-03 RX ADMIN — INSULIN LISPRO 2 UNITS: 100 INJECTION, SOLUTION INTRAVENOUS; SUBCUTANEOUS at 22:11

## 2024-07-03 RX ADMIN — HEPARIN SODIUM 5000 UNITS: 5000 INJECTION, SOLUTION INTRAVENOUS; SUBCUTANEOUS at 13:34

## 2024-07-03 RX ADMIN — ATORVASTATIN CALCIUM 80 MG: 80 TABLET, FILM COATED ORAL at 17:53

## 2024-07-03 RX ADMIN — HYDROMORPHONE HYDROCHLORIDE 0.5 MG: 1 INJECTION, SOLUTION INTRAMUSCULAR; INTRAVENOUS; SUBCUTANEOUS at 08:09

## 2024-07-03 RX ADMIN — SEVELAMER HYDROCHLORIDE 800 MG: 800 TABLET, FILM COATED PARENTERAL at 17:52

## 2024-07-03 RX ADMIN — NIFEDIPINE 30 MG: 30 TABLET, EXTENDED RELEASE ORAL at 08:12

## 2024-07-03 RX ADMIN — ALLOPURINOL 100 MG: 100 TABLET ORAL at 08:12

## 2024-07-03 RX ADMIN — HEPARIN SODIUM 5000 UNITS: 5000 INJECTION, SOLUTION INTRAVENOUS; SUBCUTANEOUS at 05:46

## 2024-07-03 RX ADMIN — OXYCODONE HYDROCHLORIDE 5 MG: 5 TABLET ORAL at 11:44

## 2024-07-03 RX ADMIN — DOCUSATE SODIUM 100 MG: 100 CAPSULE, LIQUID FILLED ORAL at 08:12

## 2024-07-03 RX ADMIN — HYDROMORPHONE HYDROCHLORIDE 0.5 MG: 1 INJECTION, SOLUTION INTRAMUSCULAR; INTRAVENOUS; SUBCUTANEOUS at 13:34

## 2024-07-03 RX ADMIN — NIFEDIPINE 30 MG: 30 TABLET, EXTENDED RELEASE ORAL at 17:53

## 2024-07-03 RX ADMIN — OXYCODONE HYDROCHLORIDE 5 MG: 5 TABLET ORAL at 17:51

## 2024-07-03 RX ADMIN — HYDROMORPHONE HYDROCHLORIDE 0.5 MG: 1 INJECTION, SOLUTION INTRAMUSCULAR; INTRAVENOUS; SUBCUTANEOUS at 19:34

## 2024-07-03 RX ADMIN — GABAPENTIN 200 MG: 100 CAPSULE ORAL at 08:12

## 2024-07-03 RX ADMIN — SEVELAMER HYDROCHLORIDE 800 MG: 800 TABLET, FILM COATED PARENTERAL at 08:12

## 2024-07-03 RX ADMIN — DOCUSATE SODIUM 100 MG: 100 CAPSULE, LIQUID FILLED ORAL at 17:53

## 2024-07-03 RX ADMIN — OXYCODONE HYDROCHLORIDE 5 MG: 5 TABLET ORAL at 23:11

## 2024-07-03 RX ADMIN — HYDROMORPHONE HYDROCHLORIDE 0.5 MG: 1 INJECTION, SOLUTION INTRAMUSCULAR; INTRAVENOUS; SUBCUTANEOUS at 02:17

## 2024-07-03 NOTE — CONSULTS
NEPHROLOGY HOSPITAL CONSULTATION   Naresh Carpio 63 y.o. male MRN: [id number]  Unit/Bed#: [address] [place] [address] Encounter: [id number]    ASSESSMENT and PLAN:  ESRD on HD ([facility], hospitals):  He missed dialysis on Saturday and Tuesday.  Will plan for dialysis today due to recent missed treatments.  Next HD after today will be tomorrow to place him back on his regular HD schedule.    Access: Right IJ PermCath.    Hypertension:  BP high on admission but better now.   kg.   Home Rx: None.   He reports being prescribed nifedipine but has not been taking it due to unclear instructions.  Make Nifedipine 30 mg daily in the evening to simplify instructions.     Anemia:  Hemoglobin at goal.    Mineral and bone disease:  He takes sevelamer and Velphoro in the outpatient setting.  Phos is above goal.   Continue sevelamer 800 mg TID in the hospital.  Low phos diet.     Right pelvic and bilateral sacral fractures  Fall    SUMMARY OF RECOMMENDATIONS:  Plan for HD today due to missed treatments.   Next HD will be tomorrow to place back on TTS sched.   Change Nifedipine to 30 mg daily in the evening to simplify.     HISTORY OF PRESENT ILLNESS:  Requesting Physician: Angie Bernard MD  Reason for Consult: ESRD on HD    Naresh Carpio is a 63 y.o. male who was admitted to [facility] on 7/2/24 after presenting with a fall. A renal consultation is requested today for assistance in the management of ESRD on HD.    Naresh has a history of HTN, DM, HLP, ESRD on HD, CAD, TIA.  He undergoes maintenance hemodialysis on a TTS schedule at [facility].  He now presents to [facility] after complaining of right buttocks pain after a mechanical fall.  On arrival, CT imaging revealed a right pelvic and bilateral sacral fractures.  He was subsequently admitted for workup and we are now been consulted for assistance with management of ESRD on HD.    He reports that he missed HD on Saturday due to feeling ill.  During that time, he had  nausea, vomiting, abdominal pain, and diarrhea and was unable to make it to dialysis.  He also missed dialysis yesterday, Tuesday, because he presented to the hospital after the fall.  He reports that his nausea, vomiting, abdominal pain, and diarrhea are now resolved.  He denies any chest pain, shortness of breath, fever, chills, leg swelling.    PAST MEDICAL HISTORY:  Past Medical History:   Diagnosis Date    CKD     Diabetes mellitus (HCC)     Hyperlipidemia     Hypertension     Left toe amputee (HCC)     TIA (transient ischemic attack)      PAST SURGICAL HISTORY:  Past Surgical History:   Procedure Laterality Date    AMPUTATION Left     left foot resection 10 years ago dr tran    IR LOWER EXTREMITY ANGIOGRAM  08/29/2023    IR TEMPORARY DIALYSIS CATHETER PLACEMENT  08/25/2023    IR TUNNELED CENTRAL LINE REMOVAL  08/23/2023    IR TUNNELED DIALYSIS CATHETER PLACEMENT  01/27/2022    IR TUNNELED DIALYSIS CATHETER PLACEMENT  08/30/2023    [place] AMPUTATION METATARSAL W/TOE SINGLE Right 8/31/2023    Procedure: RIGHT PARTIAL 1ST RAY RESECTION WITH  WOUND VAC APPLICATION;  Surgeon: Won Parmar DPM;  Location: [facility];  Service: Podiatry     ALLERGIES:  No Known Allergies    SOCIAL HISTORY:  Social History     Substance and Sexual Activity   Alcohol Use Not Currently    Alcohol/week: 0.0 standard drinks of alcohol    Comment: 0     Social History     Substance and Sexual Activity   Drug Use Never     Social History     Tobacco Use   Smoking Status Never    Passive exposure: Never   Smokeless Tobacco Never     FAMILY HISTORY:  History reviewed. No pertinent family history.    MEDICATIONS:    Current Facility-Administered Medications:     acetaminophen (TYLENOL) tablet 650 mg, 650 mg, Oral, Q6H PRN, Niharika Russell PA-C    allopurinol (ZYLOPRIM) tablet 100 mg, 100 mg, Oral, Daily, Niharika Russell PA-C, 100 mg at 07/03/24 0812    atorvastatin (LIPITOR) tablet 80 mg, 80 mg, Oral, Daily With Dinner, Niharika WALKER  DENISSE Russell    docusate sodium (COLACE) capsule 100 mg, 100 mg, Oral, BID, Niharika Russell PA-C, 100 mg at 07/03/24 0812    gabapentin (NEURONTIN) capsule 200 mg, 200 mg, Oral, BID, Niharika Russell PA-C, 200 mg at 07/03/24 0812    heparin (porcine) subcutaneous injection 5,000 Units, 5,000 Units, Subcutaneous, Q8H KEMAL, Niharika Russell PA-C, 5,000 Units at 07/03/24 0546    HYDROmorphone (DILAUDID) injection 0.5 mg, 0.5 mg, Intravenous, Q4H PRN, Niharika Russell PA-C, 0.5 mg at 07/03/24 0809    insulin lispro (HumALOG/ADMELOG) 100 units/mL subcutaneous injection 1-6 Units, 1-6 Units, Subcutaneous, TID AC **AND** Fingerstick Glucose (POCT), , , TID AC, Niharika Russell PA-C    insulin lispro (HumALOG/ADMELOG) 100 units/mL subcutaneous injection 1-6 Units, 1-6 Units, Subcutaneous, HS, Niharika Russell PA-C    [START ON 7/5/2024] NIFEdipine (PROCARDIA XL) 24 hr tablet 30 mg, 30 mg, Oral, 2 times per day on Sunday Monday Wednesday Friday, Niharika Russell PA-C    [START ON 7/4/2024] NIFEdipine (PROCARDIA XL) 24 hr tablet 30 mg, 30 mg, Oral, Once per day on Tuesday Thursday Saturday, Niharika Russell PA-C    oxyCODONE (ROXICODONE) IR tablet 5 mg, 5 mg, Oral, Q6H PRN, Niharika Russell PA-C, 5 mg at 07/03/24 0548    polyethylene glycol (MIRALAX) packet 17 g, 17 g, Oral, Daily PRN, Niharika Russell PA-C    sevelamer (RENAGEL) tablet 800 mg, 800 mg, Oral, TID With Meals, Niharika Russell PA-C, 800 mg at 07/03/24 0812    REVIEW OF SYSTEMS:  All the systems were reviewed and were negative except as documented on the HPI.    PHYSICAL EXAM:  Current Weight: Weight - Scale: 108 kg (237 lb 1.6 oz)  First Weight: Weight - Scale: 108 kg (237 lb 1.6 oz)  Vitals:    07/03/24 0441 07/03/24 0700 07/03/24 0809 07/03/24 0815   BP:  151/62 149/63    Pulse:  61 62 66   Resp:       Temp:    97.8 °F (36.6 °C)   TempSrc:       SpO2:  94% 95% 98%   Weight: 108 kg (237 lb 1.6 oz)      Height:            Intake/Output Summary (Last 24 hours) at 7/3/2024 0944  Last data filed at 7/3/2024 0441  Gross per 24 hour   Intake 0 ml   Output 0 ml   Net 0 ml     Physical Exam  General: conscious, coherent, cooperative, not in distress.   Skin: warm, dry, good turgor.   Eyes: pink conjunctivae, no scleral icterus.   ENT: moist lips and mucous membranes.   Respiratory: equal chest expansion, clear breath sounds.   Cardiovascular: distinct heart sounds, normal rate, regular rhythm, no rub  Abdomen: soft, non-tender, non-distended, normoactive bowel sounds  Extremities: no edema.  Genitourinary: no gaitan catheter.   Neuro: awake, alert, oriented to time, place and person.   Psych: appropriate affect.       Lab Results:   Results from last 7 days   Lab Units 07/03/24  0436 07/03/24  0137 07/02/24  1804   WBC Thousand/uL 7.68  --  7.35   HEMOGLOBIN g/dL 13.5 13.6 14.1   HEMATOCRIT % 41.2 41.7 43.2   PLATELETS Thousands/uL 205  --  205   POTASSIUM mmol/L 4.5  --  4.5   CHLORIDE mmol/L 90*  --  92*   CO2 mmol/L 26  --  20*   BUN mg/dL 72*  --  69*   CREATININE mg/dL 11.51*  --  11.37*   CALCIUM mg/dL 8.1*  --  8.2*   MAGNESIUM mg/dL 2.2  --  2.1   PHOSPHORUS mg/dL 7.6*  --   --    ALK PHOS U/L  --   --  64   ALT U/L  --   --  11   AST U/L  --   --  12*     Other Studies: none.

## 2024-07-03 NOTE — OCCUPATIONAL THERAPY NOTE
"OT EVALUATION       07/03/24 0913   OT Last Visit   OT Visit Date 07/03/24   Note Type   Note type Evaluation   Pain Assessment   Pain Assessment Tool 0-10   Pain Score 6  (pain medicine given by nurse prior to OT session)   Pain Location/Orientation Orientation: Right;Location: Hip   Restrictions/Precautions   RLE Weight Bearing Per Order TTWB   LLE Weight Bearing Per Order WBAT   Other Precautions Chair Alarm;Bed Alarm;Fall Risk;Contact/isolation   Home Living   Type of Home House   Home Layout Multi-level;Performs ADLs on one level;Able to live on main level with bedroom/bathroom  (12 [address], lives in a big house with multiple apartments)   Bathroom Shower/Tub Tub/shower unit  (pt sponge bathes due to port for HD)   Bathroom Toilet Standard   Home Equipment Walker;Cane   Prior Function   Level of [place] Independent with ADLs;Independent with functional mobility;Independent with IADLS   Lives With Alone   Receives Help From Family  (daughter)   IADLs Independent with driving;Independent with meal prep;Independent with medication management   Falls in the last 6 months 1 to 4   Vocational On disability   Comments patient s/p fall when pulling a mirror off his car, right inferior pubic rami, right anterior acetabular, right superior iliac wing, and bilateral S1 sacral fractures   Subjective   Subjective \"I cant go home, im going to need rehab.  [facility]\"   ADL   Eating Assistance 5  Supervision/Setup   Grooming Assistance 5  Supervision/Setup   UB Bathing Assistance 3  Moderate Assistance   LB Bathing Assistance 2  Maximal Assistance   UB Dressing Assistance 3  Moderate Assistance   LB Dressing Assistance 2  Maximal Assistance   Toileting Assistance  2  Maximal Assistance   Bed Mobility   Rolling L 2  Maximal assistance   Supine to Sit 2  Maximal assistance   Additional items Assist x 2;Verbal cues;LE management   Sit to Supine 2  Maximal assistance   Additional items Assist x 2;LE " management;Verbal cues   Transfers   Sit to Stand Unable to assess  (pt declined due to pain)   Balance   Static Sitting Fair -   Dynamic Sitting Poor +   Activity Tolerance   Activity Tolerance Patient limited by fatigue   Nurse Made Aware yes, Kaden CAZARES Assessment   RUE Assessment WFL   LUE Assessment   LUE Assessment WFL   Cognition   Overall Cognitive Status WFL   Arousal/Participation Cooperative   Attention Within functional limits   Orientation Level Oriented X4   Following Commands Follows multistep commands with increased time or repetition   Comments pt cooperative with encouragement   Assessment   Limitation Decreased ADL status;Decreased UE strength;Decreased Safe judgement during ADL;Decreased endurance;Decreased self-care trans;Decreased high-level ADLs  (decreased balance and mobility)   Prognosis Good   Assessment Patient evaluated by Occupational Therapy.  Patient admitted with Closed fracture of right pelvis (HCC).  The patients occupational profile, medical and therapy history includes a extensive additional review of physical, cognitive, or psychosocial history related to current functional performance.  Comorbidities affecting functional mobility and ADLS include: CKD, diabetes, hypertension, and TIA.  Prior to admission, patient was independent with functional mobility without assistive device, independent with ADLS, and independent with IADLS.  The evaluation identifies the following performance deficits: weakness, impaired balance, decreased endurance, increased fall risk, new onset of impairment of functional mobility, decreased ADLS, decreased IADLS, pain, decreased activity tolerance, decreased safety awareness, impaired judgement, and decreased strength, that result in activity limitations and/or participation restrictions. This evaluation requires clinical decision making of high complexity, because the patient presents with comorbidites that affect occupational performance and required  significant modification of tasks or assistance with consideration of multiple treatment options.  The Barthel Index was used as a functional outcome tool presenting with a score of Barthel Index Score: 35, indicating marked limitations of functional mobility and ADLS.  The patient's raw score on the -PAC Daily Activity Inpatient Short Form is 12. A raw score of less than 19 suggests the patient may benefit from discharge to post-acute rehabilitation services. Please refer to the recommendation of the Occupational Therapist for safe discharge planning.  Patient will benefit from skilled Occupational Therapy services to address above deficits and facilitate a safe return to prior level of function.   Goals   Patient Goals to decrease pain and go home   STG Time Frame   (1-7 days)   Short Term Goal  Goals established to promote Patient Goals: to decrease pain and go home:   Grooming: independent seated; Bathing: mod assist; Upper Body Dressing min assist; Lower Body Dressing: mod assist; Toileting: mod assist; Patient will increase ambulatory commode transfer to mod assist of 2 with rolling walker to increase performance and safety with ADLS and functional mobility; Patient will increase standing tolerance to 2 minutes during ADL task to decrease assistance level and decrease fall risk; Patient will increase bed mobility to mod assist of 2 in preparation for ADLS and transfers;  Patient will tolerate 5 minutes of UE ROM/strengthening to increase general activity tolerance and performance in ADLS/IADLS; Patient will improve functional activity tolerance to 10 minutes of sustained functional tasks to increase participation in basic self-care and decrease assistance level;  Patient will increase dynamic sitting balance to fair- to improve the ability to sit at edge of bed or on a chair for ADLS;  Patient will increase dynamic standing balance to poor to improve postural stability and decrease fall risk during standing  ADLS and transfers.   LTG Time Frame   (8-14 days)   Long Term Goal Grooming: mod assist standing at sink; Bathing: min assist; Upper Body Dressing supervision; Lower Body Dressing: min assist; Toileting: min assist; Patient will increase ambulatory commode transfer to mod assist with rolling walker to increase performance and safety with ADLS and functional mobility; Patient will increase standing tolerance to 4 minutes during ADL task to decrease assistance level and decrease fall risk; Patient will increase bed mobility to mod assist in preparation for ADLS and transfers;  Patient will increased functional mobility to bathroom with RW with mod assist to increase performance with ADLS and to use a toilet; Patient will tolerate 10 minutes of UE ROM/strengthening to increase general activity tolerance and performance in ADLS/IADLS; Patient will improve functional activity tolerance to 20 minutes of sustained functional tasks to increase participation in basic self-care and decrease assistance level;  Patient will increase dynamic sitting balance to fair to improve the ability to sit at edge of bed or on a chair for ADLS;  Patient will increase dynamic standing balance to poor+ to improve postural stability and decrease fall risk during standing ADLS and transfers.   Pt will score >/= 16/24 on AM-PAC Daily Activity Inpatient scale to promote safe independence with ADLs and functional mobility; Pt will score >/= 65/100 on Barthel Index in order to decrease caregiver assistance needed and increase ability to perform ADLs and functional mobility.   Plan   Treatment Interventions ADL retraining;Functional transfer training;Endurance training;Patient/family training;Equipment evaluation/education;Activityengagement;Compensatory technique education   Goal Expiration Date 07/17/24   OT Frequency 3-5x/wk   Discharge Recommendation   Rehab Resource Intensity Level, OT II (Moderate Resource Intensity)   AM-PAC Daily Activity  Inpatient   Lower Body Dressing 1   Bathing 1   Toileting 1   Upper Body Dressing 2   Grooming 3   Eating 4   Daily Activity Raw Score 12   Daily Activity Standardized Score (Calc for Raw Score >=11) 30.6   AM-PAC Applied Cognition Inpatient   Following a Speech/Presentation 4   Understanding Ordinary Conversation 4   Taking Medications 4   Remembering Where Things Are Placed or Put Away 4   Remembering List of 4-5 Errands 4   Taking Care of Complicated Tasks 4   Applied Cognition Raw Score 24   Applied Cognition Standardized Score 62.21   Barthel Index   Feeding 10   Bathing 0   Grooming Score 0   Dressing Score 0   Bladder Score 10   Bowels Score 10   Toilet Use Score 0   Transfers (Bed/Chair) Score 5   Mobility (Level Surface) Score 0   Stairs Score 0   Barthel Index Score 35   Licensure   [place] License Number  Lizeth Phillips MS OTR/L [id number]

## 2024-07-03 NOTE — ASSESSMENT & PLAN NOTE
Patient presents after mechanical fall landing on buttocks with right buttock pain.   CT pelvis shows right pelvic and bilateral sacral fractures. Specifically, right inferior pubic ramus fracture, the posterior fracture fragment is displaced approximately one shaft width medially, Nondisplaced right anterior acetabular fracture, Nondisplaced fracture through superior right iliac wing extending into a minimally displaced fracture of the right iliac body. Minimally displaced bilateral S1 sacral fractures.   Per ortho, planning for non-operative intervention   TTWB RLE, WBAT LLE  Heparin DVT ppx   Q6H H+H x 3   PRN analgesia   PT/OT   Ortho consult appreciated

## 2024-07-03 NOTE — ASSESSMENT & PLAN NOTE
Lab Results   Component Value Date    EGFR 4 07/02/2024    EGFR 7 04/09/2024    EGFR 6 04/08/2024    CREATININE 11.37 (H) 07/02/2024    CREATININE 6.90 (H) 04/09/2024    CREATININE 7.93 (H) 04/08/2024     HD TTS at [facility]; PATRICIA chacon   Missed HD on Saturday due to illness and today (Tuesday) due to fall   Fortunately, no hyperkalemia, uremic sx, fluid overload   Nephrology consult for HD session tomorrow (Wednesday)

## 2024-07-03 NOTE — PHYSICAL THERAPY NOTE
PHYSICAL THERAPY EVALUATION/TREATMENT     07/03/24 0901   PT Last Visit   PT Visit Date 07/03/24   Note Type   Note type Evaluation   Pain Assessment   Pain Assessment Tool 0-10   Pain Score 6  (6/10 at rest ; mostly R hip/back/pelvis ; increased to severe with mobility)   Pain Location/Orientation Orientation: Right;Location: Hip;Location: Groin;Location: Leg;Location: Back   Pain Onset/Description Onset: Ongoing;Descriptor: Sore;Descriptor: Aching;Frequency: Constant/Continuous   Effect of Pain on Daily Activities Limited rest/mobility   Patient's Stated Pain Goal No pain   Hospital Pain Intervention(s) Repositioned;Ambulation/increased activity   Multiple Pain Sites No   Restrictions/Precautions   Weight Bearing Precautions Per Order Yes   RLE Weight Bearing Per Order TTWB   LLE Weight Bearing Per Order WBAT   Other Precautions Bed Alarm;Chair Alarm;Fall Risk;Pain;WBS   Home Living   Type of Home House   Home Layout Multi-level;Performs ADLs on one level;Able to live on main level with bedroom/bathroom;Stairs to enter with rails  ([address] ; reports living in big house with multiple apartments)   Bathroom Toilet Standard   Home Equipment Walker;Cane   Prior Function   Level of [place] Independent with ADLs;Independent with functional mobility;Independent with IADLS   Lives With Alone   Receives Help From Family  (reports supportive daughter)   IADLs Independent with driving;Independent with medication management;Independent with meal prep   Falls in the last 6 months 1 to 4   Vocational On disability   General   Additional Pertinent History Pt is a 63 year-old male who was admitted to the hospital on 7/2/24 s/p fall. CT revealed right pelvic and bilateral sacral fractures. Specifically, right inferior pubic ramus fracture, the posterior fracture fragment is displaced approximately one shaft width medially, Nondisplaced right anterior acetabular fracture, Nondisplaced fracture through superior right  iliac wing extending into a minimally displaced fracture of the right iliac body. Minimally displaced bilateral S1 sacral fractures.   Family/Caregiver Present No   Cognition   Overall Cognitive Status WFL   Arousal/Participation Cooperative   Orientation Level Oriented X4   Following Commands Follows multistep commands with increased time or repetition   Subjective   Subjective Pt agreeable to PT session this AM with verbal encouragement   RLE Assessment   RLE Assessment   (hip/knee 1/5 escoto by severe pain ; PROM WFL with increased pain ; Ankle WFL)   LLE Assessment   LLE Assessment WFL  (hip/knee at least 3/5 escoto by pain ; Ankle WFL)   Bed Mobility   Rolling L 2  Maximal assistance   Additional items Assist x 1;Verbal cues;Increased time required;LE management   Supine to Sit 2  Maximal assistance   Additional items Assist x 2;Verbal cues;Increased time required;LE management;Bedrails;HOB elevated   Sit to Supine 2  Maximal assistance   Additional items Assist x 2;Verbal cues;Increased time required;LE management;HOB elevated;Bedrails   Additional Comments Pt able to tolerate sitting at EOB for approx 2-3 minutes with Min A progressing to close supervision ; increased pain reported with all attempts at repositioning   Transfers   Sit to Stand Unable to assess  (pt declines due to severe pain in sitting)   Stand to Sit Unable to assess   Stand pivot Unable to assess   Ambulation/Elevation   Gait pattern Not appropriate   Gait Assistance Not tested   Balance   Static Sitting Fair -   Dynamic Sitting Poor +   Static Standing Zero   Activity Tolerance   Activity Tolerance Patient limited by pain   Nurse Made Aware yes RN Kaden   Assessment   Prognosis Good   Problem List Decreased strength;Decreased range of motion;Decreased endurance;Impaired balance;Decreased mobility;Impaired judgement;Decreased safety awareness;Pain;Decreased skin integrity;Orthopedic restrictions   Assessment Patient seen for Physical Therapy  evaluation. Patient admitted with Closed fracture of right pelvis (HCC).  Comorbidities affecting patient's physical performance include: anemia, arthritis, cardiac disease, diabetes, ESRD on hemodialysis, falls, hypertension, neuropathy, osteoarthritis, and TIA.  Personal factors affecting patient at time of initial evaluation include: lives in multi story house, stairs to enter home, inability to ambulate household distances, inability to navigate community distances, inability to navigate level surfaces without external assistance, positive fall history, inability to perform ADLS, inability to perform IADLS , and inability to live alone. Prior to admission, patient was independent with functional mobility without assistive device, independent with ADLS, independent with IADLS, living alone in multi story home with 13 steps to enter, ambulating household distance, and ambulating community distances.  Please find objective findings from Physical Therapy assessment regarding body systems outlined above with impairments and limitations including weakness, decreased ROM, impaired balance, decreased endurance, gait deviations, pain, decreased activity tolerance, decreased functional mobility tolerance, decreased safety awareness, impaired judgement, fall risk, and orthopedic restrictions.  The Barthel Index was used as a functional outcome tool presenting with a score of Barthel Index Score: 35 today indicating marked limitations of functional mobility and ADLS.  Patient's clinical presentation is currently unstable/unpredictable as seen in patient's presentation of changing level of pain, increased fall risk, new onset of impairment of functional mobility, decreased endurance, and new onset of weakness. Pt would benefit from continued Physical Therapy treatment to address deficits as defined above and maximize level of functional mobility. As demonstrated by objective findings, the assigned level of complexity for  this evaluation is high.The patient's [facility] Basic Mobility Inpatient Short Form Raw Score is 7. A Raw score of less than or equal to 16 suggests the patient may benefit from discharge to post-acute rehabilitation services. Please also refer to the recommendation of the Physical Therapist for safe discharge planning.   Goals   Patient Goals to decrease pain   STG Expiration Date 07/10/24   Short Term Goal #1 Pt will perform simple HEP with Min A ; Pt will perform bed mobility with Mod A ; Pt will tolerate sitting at EOB x 10 minutes with fair sitting balance to improve upright postural control ; Pt will ambulate x 5 feet with Max A + RW ; LLE strength will improve to at least 2/5 to improve tolerance to functional mobility ; pt will perform bed <> chair transfer with Max A + RW ; AMPAC score will improve >10/24 to demonstrate improved functional independence   LT Expiration Date 07/17/24   Long Term Goal #1 Pt will perform simple HEP IND ; Pt will perform bed mobility with Min A ; Pt will tolerate sitting at EOB x 10 minutes with fair+ sitting balance to improve upright postural control ; Pt will ambulate x 15 feet with Max A + RW ; LLE strength will improve to at least 2+/5 to improve tolerance to functional mobility ; pt will perform bed <> chair transfer with Mod A + RW ; AMPAC score will improve >12/24 to demonstrate improved functional independence   Plan   Treatment/Interventions ADL retraining;Functional transfer training;LE strengthening/ROM;Therapeutic exercise;Endurance training;Bed mobility;Gait training;Spoke to nursing;OT   PT Frequency Other (Comment)  (daily)   Discharge Recommendation   Rehab Resource Intensity Level, PT II (Moderate Resource Intensity)   AM-PAC Basic Mobility Inpatient   Turning in Flat Bed Without Bedrails 2   Lying on Back to Sitting on Edge of Flat Bed Without Bedrails 1   Moving Bed to Chair 1   Standing Up From Chair Using Arms 1   Walk in Room 1   Climb 3-5 Stairs With  Railing 1   Basic Mobility Inpatient Raw Score 7   Turning Head Towards Sound 4   Follow Simple Instructions 4   Low Function Basic Mobility Raw Score  15   Low Function Basic Mobility Standardized Score  23.9   [facility] Level Of Mobility   [facility] Goal 2: Bed activities/Dependent transfer   -[facility] Achieved 3: Sit at edge of bed   Barthel Index   Feeding 10   Bathing 0   Grooming Score 0   Dressing Score 0   Bladder Score 10   Bowels Score 10   Toilet Use Score 0   Transfers (Bed/Chair) Score 5   Mobility (Level Surface) Score 0   Stairs Score 0   Barthel Index Score 35   End of Consult   Patient Position at End of Consult Supine;Bed/Chair alarm activated;All needs within reach   Licensure   [place] License Number  Lizeth Gill [id number]

## 2024-07-03 NOTE — PROGRESS NOTES
Patient:  VICTORINA HARLEY    MRN:  [id number]    Aidin Request ID:  [id number]    Level of care reserved:  Skilled Nursing Facility    Partner Reserved:  [place] [address] [phone]    Clinical needs requested:    Geography searched:  10 miles around [phone]    Start of Service:    Request sent:  10:21am EDT on 7/3/2024 by Alisson Fuentes    Partner reserved:  3:15pm EDT on 7/3/2024 by Alisson Fuentes    Choice list shared:

## 2024-07-03 NOTE — PLAN OF CARE
Problem: PAIN - ADULT  Goal: Verbalizes/displays adequate comfort level or baseline comfort level  Description: Interventions:  - Encourage patient to monitor pain and request assistance  - Assess pain using appropriate pain scale  - Administer analgesics based on type and severity of pain and evaluate response  - Implement non-pharmacological measures as appropriate and evaluate response  - Consider cultural and social influences on pain and pain management  - Notify physician/advanced practitioner if interventions unsuccessful or patient reports new pain  Outcome: Progressing     Problem: INFECTION - ADULT  Goal: Absence or prevention of progression during hospitalization  Description: INTERVENTIONS:  - Assess and monitor for signs and symptoms of infection  - Monitor lab/diagnostic results  - Monitor all insertion sites, i.e. indwelling lines, tubes, and drains  - Monitor endotracheal if appropriate and nasal secretions for changes in amount and color  - [place] appropriate cooling/warming therapies per order  - Administer medications as ordered  - Instruct and encourage patient and family to use good hand hygiene technique  - Identify and instruct in appropriate isolation precautions for identified infection/condition  Outcome: Progressing     Problem: INFECTION - ADULT  Goal: Absence of fever/infection during neutropenic period  Description: INTERVENTIONS:  - Monitor WBC    Outcome: Progressing     Problem: SAFETY ADULT  Goal: Patient will remain free of falls  Description: INTERVENTIONS:  - Educate patient/family on patient safety including physical limitations  - Instruct patient to call for assistance with activity   - Consult OT/PT to assist with strengthening/mobility   - Keep Call bell within reach  - Keep bed low and locked with side rails adjusted as appropriate  - Keep care items and personal belongings within reach  - Initiate and maintain comfort rounds  - Make Fall Risk Sign visible to staff  -  Offer Toileting every 2 Hours, in advance of need  - Initiate/Maintain bed alarm  - Obtain necessary fall risk management equipment:   - Apply yellow socks and bracelet for high fall risk patients  - Consider moving patient to room near nurses station  Outcome: Progressing

## 2024-07-03 NOTE — ASSESSMENT & PLAN NOTE
Lab Results   Component Value Date    EGFR 4 07/03/2024    EGFR 4 07/02/2024    EGFR 7 04/09/2024    CREATININE 11.51 (H) 07/03/2024    CREATININE 11.37 (H) 07/02/2024    CREATININE 6.90 (H) 04/09/2024     HD TTS at [facility]; PATRICIA chacon   Missed HD on Saturday due to illness and today (Tuesday) due to fall   Fortunately, no hyperkalemia, uremic sx, fluid overload   D/W nephro recs:  HD today per minute sessions and tomorrow

## 2024-07-03 NOTE — PROGRESS NOTES
"[facility]  Progress Note  Name: Naresh Carpio I  MRN: [id number]  Unit/Bed#: [address] [place] [address] Date of Admission: 7/2/2024   Date of Service: 7/3/2024 I Hospital Day: 1    Assessment & Plan   * Closed fracture of right pelvis (HCC)  Assessment & Plan  Patient presents after mechanical fall landing on buttocks with right buttock pain.     CT pelvis shows right pelvic and bilateral sacral fractures. Specifically, right inferior pubic ramus fracture, the posterior fracture fragment is displaced approximately one shaft width medially, Nondisplaced right anterior acetabular fracture, Nondisplaced fracture through superior right iliac wing extending into a minimally displaced fracture of the right iliac body. Minimally displaced bilateral S1 sacral fractures.   D/W Ortho recs:  non-operative intervention   TTWB RLE/WBAT LLE  Heparin DVT ppx   Q6H H+H x 3-WNL  PRN analgesia   PT/OT recs  Ortho consult appreciated    Bilateral sacral fracture, closed (HCC)  Assessment & Plan  CT pelvis shows Minimally displaced bilateral S1 sacral fractures   2/2 mechanical fall   Management as stated above under \"closed fracture of right pelvis\"     Type 2 diabetes mellitus, without long-term current use of insulin ([facility])  Assessment & Plan  Lab Results   Component Value Date    HGBA1C 6.2 (H) 07/02/2024       Recent Labs     07/02/24  2359 07/03/24  0717 07/03/24  1132   POCGLU 115 97 112       SSI with accuchecks  Encourage diet control on discharge and close follow-up with PCP for consideration of initiation of metformin      ESRD (end stage renal disease) ([facility])  Assessment & Plan  Lab Results   Component Value Date    EGFR 4 07/03/2024    EGFR 4 07/02/2024    EGFR 7 04/09/2024    CREATININE 11.51 (H) 07/03/2024    CREATININE 11.37 (H) 07/02/2024    CREATININE 6.90 (H) 04/09/2024     HD TTS at [facility]; PATRICIA chacon   Missed HD on Saturday due to illness and today (Tuesday) due to fall   Fortunately, " no hyperkalemia, uremic sx, fluid overload   D/W nephro recs:  HD today per minute sessions and tomorrow      Essential hypertension  Assessment & Plan  BP above goal possibly 2/2 pain   Continue Nifedipine nightly           VTE Pharmacologic Prophylaxis: VTE Score: 8 Moderate Risk (Score 3-4) - Pharmacological DVT Prophylaxis Ordered: heparin.    Mobility:   Basic Mobility Inpatient Raw Score: 7  JH-HLM Goal: 2: Bed activities/Dependent transfer  JH-HLM Achieved: 3: Sit at edge of bed  JH-HLM Goal achieved. Continue to encourage appropriate mobility.    Patient Centered Rounds: I performed bedside rounds with nursing staff today.   Discussions with Specialists or Other Care Team Provider: Ortho, nephro    Education and Discussions with Family / Patient:  Patient.     Total Time Spent on Date of Encounter in care of patient: 40 mins. This time was spent on one or more of the following: performing physical exam; counseling and coordination of care; obtaining or reviewing history; documenting in the medical record; reviewing/ordering tests, medications or procedures; communicating with other healthcare professionals and discussing with patient's family/caregivers.    Current Length of Stay: 1 day(s)  Current Patient Status: Inpatient   Certification Statement: The patient will continue to require additional inpatient hospital stay due to specialist recs PT OT eval  Discharge Plan: Anticipate discharge in 24-48 hrs to discharge location to be determined pending rehab evaluations.    Code Status: Level 1 - Full Code    Subjective:   Patient seen and examined at bedside, reported pain with movement right-sided with radiation into the groin.  Patient denied any chest pain palpitations shortness of breath or bleeding or abdominal pain fever or chills..  Ortho bedside discussed discharge recs    Objective:     Vitals:   Temp (24hrs), Av.5 °F (36.4 °C), Min:96.2 °F (35.7 °C), Max:98.2 °F (36.8 °C)    Temp:  [96.2 °F  (35.7 °C)-98.2 °F (36.8 °C)] 97.9 °F (36.6 °C)  HR:  [59-70] 65  Resp:  [18-20] 20  BP: (115-195)/(62-85) 139/70  SpO2:  [94 %-99 %] 98 %  Body mass index is 31.28 kg/m².     Input and Output Summary (last 24 hours):     Intake/Output Summary (Last 24 hours) at 7/3/2024 1553  Last data filed at 7/3/2024 1430  Gross per 24 hour   Intake 200 ml   Output 0 ml   Net 200 ml       Physical Exam:   Physical Exam  Vitals and nursing note reviewed.   Constitutional:       General: He is not in acute distress.     Appearance: He is well-developed. He is obese.   HENT:      Head: Normocephalic and atraumatic.   Eyes:      Conjunctiva/sclera: Conjunctivae normal.   Cardiovascular:      Rate and Rhythm: Normal rate and regular rhythm.      Heart sounds: No murmur heard.  Pulmonary:      Effort: Pulmonary effort is normal. No respiratory distress.      Breath sounds: Normal breath sounds.   Abdominal:      Palpations: Abdomen is soft.      Tenderness: There is no abdominal tenderness.   Musculoskeletal:         General: No swelling.      Cervical back: Neck supple.      Right lower leg: No edema.      Left lower leg: No edema.      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   Skin:     General: Skin is warm and dry.      Capillary Refill: Capillary refill takes less than 2 seconds.   Neurological:      Mental Status: He is alert.   Psychiatric:         Mood and Affect: Mood normal.          Additional Data:     Labs:  Results from last 7 days   Lab Units 07/03/24  0436   WBC Thousand/uL 7.68   HEMOGLOBIN g/dL 13.5   HEMATOCRIT % 41.2   PLATELETS Thousands/uL 205   SEGS PCT % 72   LYMPHO PCT % 16   MONO PCT % 9   EOS PCT % 2     Results from last 7 days   Lab Units 07/03/24  0436 07/02/24  1804   SODIUM mmol/L 132* 130*   POTASSIUM mmol/L 4.5 4.5   CHLORIDE mmol/L 90* 92*   CO2 mmol/L 26 20*   BUN mg/dL 72* 69*   CREATININE mg/dL 11.51* 11.37*   ANION GAP mmol/L 16* 18*   CALCIUM mg/dL 8.1* 8.2*   ALBUMIN g/dL  --   4.0   TOTAL BILIRUBIN mg/dL  --  0.44   ALK PHOS U/L  --  64   ALT U/L  --  11   AST U/L  --  12*   GLUCOSE RANDOM mg/dL 88 115         Results from last 7 days   Lab Units 07/03/24  1132 07/03/24  0717 07/02/24  2359   POC GLUCOSE mg/dl 112 97 115     Results from last 7 days   Lab Units 07/02/24  1804   HEMOGLOBIN A1C % 6.2*           Lines/Drains:  Invasive Devices       Peripheral Intravenous Line  Duration             Peripheral IV 07/02/24 Dorsal (posterior);Left Forearm <1 day              Hemodialysis Catheter  Duration             HD Permanent Double Catheter -- days                  Imaging: Reviewed radiology reports from this admission including: abdominal/pelvic CT    Recent Cultures (last 7 days):         Last 24 Hours Medication List:   Current Facility-Administered Medications   Medication Dose Route Frequency Provider Last Rate    acetaminophen  650 mg Oral Q6H PRN Niharika Russell PA-C      allopurinol  100 mg Oral Daily Niharika Russell PA-C      atorvastatin  80 mg Oral Daily With Dinner Niharika Russell PA-C      docusate sodium  100 mg Oral BID Niharika Russell PA-C      gabapentin  200 mg Oral BID Niharika Russell PA-C      heparin (porcine)  5,000 Units Subcutaneous Q8H KEMAL Niharika Russell PA-C      HYDROmorphone  0.5 mg Intravenous Q4H PRN Niharika Russell PA-C      HYDROmorphone  1 mg Intramuscular Once Angie Bernard MD      insulin lispro  1-6 Units Subcutaneous TID AC Niharika Russell PA-C      insulin lispro  1-6 Units Subcutaneous HS Niharika Russell PA-C      NIFEdipine  30 mg Oral After Dinner Hamilton Mack MD      oxyCODONE  5 mg Oral Q6H PRN Niharika Russell PA-C      polyethylene glycol  17 g Oral Daily PRN Niharika Russell PA-C      sevelamer  800 mg Oral TID With Meals Niharika Russell PA-C          Today, Patient Was Seen By: Angie Bernard MD    **Please Note: This note may have been constructed using a voice recognition  system.**

## 2024-07-03 NOTE — ASSESSMENT & PLAN NOTE
Lab Results   Component Value Date    HGBA1C 6.2 (H) 07/02/2024       Recent Labs     07/02/24  2359 07/03/24  0717 07/03/24  1132   POCGLU 115 97 112       SSI with accuchecks  Encourage diet control on discharge and close follow-up with PCP for consideration of initiation of metformin

## 2024-07-03 NOTE — PLAN OF CARE
Problem: PHYSICAL THERAPY ADULT  Goal: Performs mobility at highest level of function for planned discharge setting.  See evaluation for individualized goals.  Description: Treatment/Interventions: ADL retraining, Functional transfer training, LE strengthening/ROM, Therapeutic exercise, Endurance training, Bed mobility, Gait training, Spoke to nursing, OT          See flowsheet documentation for full assessment, interventions and recommendations.  Note: Prognosis: Good  Problem List: Decreased strength, Decreased range of motion, Decreased endurance, Impaired balance, Decreased mobility, Impaired judgement, Decreased safety awareness, Pain, Decreased skin integrity, Orthopedic restrictions  Assessment: Patient seen for Physical Therapy evaluation. Patient admitted with Closed fracture of right pelvis (HCC).  Comorbidities affecting patient's physical performance include: anemia, arthritis, cardiac disease, diabetes, ESRD on hemodialysis, falls, hypertension, neuropathy, osteoarthritis, and TIA.  Personal factors affecting patient at time of initial evaluation include: lives in multi story house, stairs to enter home, inability to ambulate household distances, inability to navigate community distances, inability to navigate level surfaces without external assistance, positive fall history, inability to perform ADLS, inability to perform IADLS , and inability to live alone. Prior to admission, patient was independent with functional mobility without assistive device, independent with ADLS, independent with IADLS, living alone in multi story home with 13 steps to enter, ambulating household distance, and ambulating community distances.  Please find objective findings from Physical Therapy assessment regarding body systems outlined above with impairments and limitations including weakness, decreased ROM, impaired balance, decreased endurance, gait deviations, pain, decreased activity tolerance, decreased functional  mobility tolerance, decreased safety awareness, impaired judgement, fall risk, and orthopedic restrictions.  The Barthel Index was used as a functional outcome tool presenting with a score of Barthel Index Score: 35 today indicating marked limitations of functional mobility and ADLS.  Patient's clinical presentation is currently unstable/unpredictable as seen in patient's presentation of changing level of pain, increased fall risk, new onset of impairment of functional mobility, decreased endurance, and new onset of weakness. Pt would benefit from continued Physical Therapy treatment to address deficits as defined above and maximize level of functional mobility. As demonstrated by objective findings, the assigned level of complexity for this evaluation is high.The patient's -[facility] Basic Mobility Inpatient Short Form Raw Score is 7. A Raw score of less than or equal to 16 suggests the patient may benefit from discharge to post-acute rehabilitation services. Please also refer to the recommendation of the Physical Therapist for safe discharge planning.        Rehab Resource Intensity Level, PT: II (Moderate Resource Intensity)    See flowsheet documentation for full assessment.

## 2024-07-03 NOTE — CONSULTS
Orthopedics   Naresh Carpio 63 y.o. male MRN: [id number]  Unit/Bed#: [address] [place] [address]      Chief Complaint:   right posterior hip pain    HPI:   63 y.o. male community ambulator status post fall complaining or right posterior hip pain. He was working on his car when he landed on his right buttocks. The patient denies headstrike or LOC. He is not on a blood thinner at baseline. Exacerbating factors include attempting hip motion and rolling in bed, while remitting factors include rest and pain medications. He notes mild pain radiating to the groin and right-sided low back. The patient does have neuropathy at baseline and notes this remains unchanged. No open wounds or bruising noted. No other complaints at this time. The patient denies significant low back pain, fever, chills, CP, SOB, N/V, dizziness/lightheadedness. PMH significant for ESRD on dialysis, DM type 2, HTN, history of TIA.     Review Of Systems:   Skin: Normal  Neuro: See HPI  Musculoskeletal: See HPI  14 point review of systems negative except as stated above     Past Medical History:   Past Medical History:   Diagnosis Date    CKD     Diabetes mellitus (HCC)     Hyperlipidemia     Hypertension     Left toe amputee (HCC)     TIA (transient ischemic attack)        Past Surgical History:   Past Surgical History:   Procedure Laterality Date    AMPUTATION Left     left foot resection 10 years ago dr tran    IR LOWER EXTREMITY ANGIOGRAM  08/29/2023    IR TEMPORARY DIALYSIS CATHETER PLACEMENT  08/25/2023    IR TUNNELED CENTRAL LINE REMOVAL  08/23/2023    IR TUNNELED DIALYSIS CATHETER PLACEMENT  01/27/2022    IR TUNNELED DIALYSIS CATHETER PLACEMENT  08/30/2023    [place] AMPUTATION METATARSAL W/TOE SINGLE Right 8/31/2023    Procedure: RIGHT PARTIAL 1ST RAY RESECTION WITH  WOUND VAC APPLICATION;  Surgeon: Won Parmar DPM;  Location: [facility] OR;  Service: Podiatry       Family History:  Family history reviewed and non-contributory  History reviewed. No pertinent  family history.    Social History:  Social History     Socioeconomic History    Marital status:      Spouse name: None    Number of children: None    Years of education: None    Highest education level: None   Occupational History    None   Tobacco Use    Smoking status: Never     Passive exposure: Never    Smokeless tobacco: Never   Vaping Use    Vaping status: Never Used   Substance and Sexual Activity    Alcohol use: Not Currently     Alcohol/week: 0.0 standard drinks of alcohol     Comment: 0    Drug use: Never    Sexual activity: None   Other Topics Concern    None   Social History Narrative    None     Social Determinants of Health     Financial Resource Strain: Not on file   Food Insecurity: No Food Insecurity (4/9/2024)    Hunger Vital Sign     Worried About Running Out of Food in the Last Year: Never true     Ran Out of Food in the Last Year: Never true   Transportation Needs: No Transportation Needs (4/9/2024)    PRAPARE - Transportation     Lack of Transportation (Medical): No     Lack of Transportation (Non-Medical): No   Physical Activity: Not on file   Stress: Not on file   Social Connections: Not on file   Intimate Partner Violence: Not on file   Housing Stability: High Risk (4/9/2024)    Housing Stability Vital Sign     Unable to Pay for Housing in the Last Year: No     Number of Times Moved in the Last Year: 2     Homeless in the Last Year: No       Allergies:   No Known Allergies        Labs:  0   Lab Value Date/Time    HCT 41.2 07/03/2024 0436    HCT 41.7 07/03/2024 0137    HCT 43.2 07/02/2024 1804    HGB 13.5 07/03/2024 0436    HGB 13.6 07/03/2024 0137    HGB 14.1 07/02/2024 1804    INR 1.16 08/19/2023 1053    WBC 7.68 07/03/2024 0436    WBC 7.35 07/02/2024 1804    WBC 5.20 04/09/2024 1322    ESR 79 (H) 08/22/2023 1053    .1 (H) 08/22/2023 1053       Meds:    Current Facility-Administered Medications:     acetaminophen (TYLENOL) tablet 650 mg, 650 mg, Oral, Q6H Niharika SCHULER  DENISSE Russell    allopurinol (ZYLOPRIM) tablet 100 mg, 100 mg, Oral, Daily, Niharika Russell PA-C    atorvastatin (LIPITOR) tablet 80 mg, 80 mg, Oral, Daily With Dinner, Niharika Russell PA-C    docusate sodium (COLACE) capsule 100 mg, 100 mg, Oral, BID, Niharika Russell PA-C, 100 mg at 07/02/24 2352    gabapentin (NEURONTIN) capsule 200 mg, 200 mg, Oral, BID, Niharika Russell PA-C, 200 mg at 07/02/24 2352    heparin (porcine) subcutaneous injection 5,000 Units, 5,000 Units, Subcutaneous, Q8H KEMAL, Niharika Russell PA-C, 5,000 Units at 07/03/24 0546    HYDROmorphone (DILAUDID) injection 0.5 mg, 0.5 mg, Intravenous, Q4H PRN, Niharika Russell PA-C, 0.5 mg at 07/03/24 0217    insulin lispro (HumALOG/ADMELOG) 100 units/mL subcutaneous injection 1-6 Units, 1-6 Units, Subcutaneous, TID AC **AND** Fingerstick Glucose (POCT), , , TID AC, Niharika Russell PA-C    insulin lispro (HumALOG/ADMELOG) 100 units/mL subcutaneous injection 1-6 Units, 1-6 Units, Subcutaneous, HS, Niharika Russell PA-C    [START ON 7/5/2024] NIFEdipine (PROCARDIA XL) 24 hr tablet 30 mg, 30 mg, Oral, 2 times per day on Sunday Monday Wednesday Friday, Niharika Russell PA-C    [START ON 7/4/2024] NIFEdipine (PROCARDIA XL) 24 hr tablet 30 mg, 30 mg, Oral, Once per day on Tuesday Thursday Saturday, Niharika Russell PA-C    NIFEdipine (PROCARDIA XL) 24 hr tablet 30 mg, 30 mg, Oral, Once, Niharika Russell PA-C    oxyCODONE (ROXICODONE) IR tablet 5 mg, 5 mg, Oral, Q6H PRN, Niharika Russell PA-C, 5 mg at 07/03/24 0548    polyethylene glycol (MIRALAX) packet 17 g, 17 g, Oral, Daily PRN, Niharika Russell PA-C    sevelamer (RENAGEL) tablet 800 mg, 800 mg, Oral, TID With Meals, Niharika Russell PA-C    Blood Culture:   Lab Results   Component Value Date    BLOODCX No Growth After 5 Days. 03/12/2024    BLOODCX No Growth After 5 Days. 03/12/2024       Wound Culture:   Lab Results   Component Value Date     "WOUNDCULT 2+ Growth of Staphylococcus aureus (A) 03/18/2024       Ins and Outs:  No intake/output data recorded.          Physical Exam:   /65   Pulse 69   Temp 97.9 °F (36.6 °C)   Resp 18   Ht 6' 1\" (1.854 m)   Wt 108 kg (237 lb 1.6 oz)   SpO2 96%   BMI 31.28 kg/m²   Gen: No acute distress, resting comfortably in bed  HEENT: Eyes clear, moist mucus membranes, hearing intact  Respiratory: No audible wheezing or stridor  Cardiovascular: Well Perfused peripherally, 2+ distal pulse  Abdomen: nondistended, no peritoneal signs  Musculoskeletal: right lower extremity  Skin intact  Tender to palpation over anterior pelvis. No greater trochanter tenderness  Leg lengths equal  ROM not assessed 2/2 known fracture  SILT L3-S1 with exception of great toe distribution 2/2 prior amputation  Positive knee flexion/extension, ankle DF/PF. Able to move toes freely  Palpable DP pulse  Musculature is soft and compressible, no pain with passive stretch  No calf tenderness or swelling    Radiology:   I personally reviewed the films.  CT pelvis shows FINDINGS:     REPRODUCTIVE ORGANS: Unremarkable for patient's age.     URINARY BLADDER: Unremarkable.     VISUALIZED BOWEL: Unremarkable.     APPENDIX: Normal.     ABDOMINOPELVIC CAVITY: No ascites. No pneumoperitoneum. No lymphadenopathy.     VESSELS: Unremarkable for patient's age.     ABDOMINOPELVIC WALL/INGUINAL REGIONS: Unremarkable.     BONES:     -Right inferior pubic ramus fracture, the posterior fracture fragment is displaced approximately one shaft width medially (3:96)  - Nondisplaced right anterior acetabular fracture (3:74)  - Nondisplaced fracture through superior right iliac wing (coronal 601:128), extending into a minimally displaced fracture of the right iliac body (3:37, 46).  - Minimally displaced bilateral S1 sacral fractures (left 601:122, right 601:126)     Lower lumbar degenerative changes without acute vertebral fracture.  Bilateral femurs unremarkable " without acute fracture.  The left iliac bone and pubic rami are unremarkable without acute fracture.  The pubic symphysis and bilateral sacroiliac joints are intact.    _*_*_*_*_*_*_*_*_*_*_*_*_*_*_*_*_*_*_*_*_*_*_*_*_*_*_*_*_*_*_*_*_*_*_*_*_*_*_*_*_*    Assessment:  63 y.o. male S/P fall with right inferior pubic rami, right anterior acetabular, right superior iliac wing, and bilateral S1 sacral fractures    Plan:   TTWB RLE and WBAT LLE with assistive devices  Q6 Hgb checks remain stable. Continue monitoring AM labs without further need of close Hgb monitoring  Analgesics prn per primary team  Recommend starting Lovenox or equivalent for DVT ppx  PT/OT  Continue medical management per primary team. Appreciate Nephrology input.  Dispo: Ortho signing off at this time.    Ila Tran PA-C

## 2024-07-03 NOTE — H&P
"[facility]  H&P  Name: Naresh Carpio 63 y.o. male I MRN: [id number]  Unit/Bed#: [address] [place] [address] I Date of Admission: 7/2/2024   Date of Service: 7/3/2024 I Hospital Day: 1      Assessment & Plan   * Closed fracture of right pelvis ([facility])  Assessment & Plan  Patient presents after mechanical fall landing on buttocks with right buttock pain.   CT pelvis shows right pelvic and bilateral sacral fractures. Specifically, right inferior pubic ramus fracture, the posterior fracture fragment is displaced approximately one shaft width medially, Nondisplaced right anterior acetabular fracture, Nondisplaced fracture through superior right iliac wing extending into a minimally displaced fracture of the right iliac body. Minimally displaced bilateral S1 sacral fractures.   Per ortho, planning for non-operative intervention   TTWB RLE, WBAT LLE  Heparin DVT ppx   Q6H H+H x 3   PRN analgesia   PT/OT   Ortho consult appreciated    Bilateral sacral fracture, closed ([facility])  Assessment & Plan  CT pelvis shows Minimally displaced bilateral S1 sacral fractures   2/2 mechanical fall   Management as stated above under \"closed fracture of right pelvis\"     ESRD (end stage renal disease) ([facility])  Assessment & Plan  Lab Results   Component Value Date    EGFR 4 07/02/2024    EGFR 7 04/09/2024    EGFR 6 04/08/2024    CREATININE 11.37 (H) 07/02/2024    CREATININE 6.90 (H) 04/09/2024    CREATININE 7.93 (H) 04/08/2024     HD TTS at [facility]; PATRICIA chacon   Missed HD on Saturday due to illness and today (Tuesday) due to fall   Fortunately, no hyperkalemia, uremic sx, fluid overload   Nephrology consult for HD session tomorrow (Wednesday)     Essential hypertension  Assessment & Plan  BP above goal possibly 2/2 pain   Continue Nifedipine     Type 2 diabetes mellitus, without long-term current use of insulin ([facility])  Assessment & Plan  Lab Results   Component Value Date    HGBA1C 5.4 03/12/2024       Recent Labs     " 07/02/24  2359   POCGLU 115       Blood Sugar Average: Last 72 hrs:  (P) 115    SSI with accuchecks             VTE Pharmacologic Prophylaxis: VTE Score: 8 High Risk (Score >/= 5) - Pharmacological DVT Prophylaxis Ordered: heparin. Sequential Compression Devices Ordered.  Code Status: Level 1 - Full Code   Discussion with family: Patient declined call to .     Anticipated Length of Stay: Patient will be admitted on an inpatient basis with an anticipated length of stay of greater than 2 midnights secondary to s/p mechanical fall with right pelvic and bilateral sacral fractures.    Total Time Spent on Date of Encounter in care of patient: 60 mins. This time was spent on one or more of the following: performing physical exam; counseling and coordination of care; obtaining or reviewing history; documenting in the medical record; reviewing/ordering tests, medications or procedures; communicating with other healthcare professionals and discussing with patient's family/caregivers.    Chief Complaint: right buttocks pain after fall     History of Present Illness:  Naresh Carpio is a 63 y.o. male with a PMH of ESRD on HD, HTN, T2DM who presents with right buttocks pain after mechanical fall. Patient reports he was working on his car and trying to pull off a mirror when he lost his balance and fell onto his buttocks. Unable to bear full weight on RLE. CT pelvis shows right pelvic fracture and bilateral sacral fractures. ER discussed with ortho who states non-operative management for now. Patient lives at home alone, agreeable to STR if necessary.     Additionally, patient reports that he missed his HD on Saturday and today (Tuesday). Normal TTS schedule. He states after dialysis on Thursday he started to feel ill with vomiting which is why he didn't go to dialysis on Saturday. He states these symptoms resolved. Missed dialysis today due to fall and being in the hospital. Patient denies any chest pain or SOB.        Review of Systems:  Review of Systems   Constitutional:  Negative for chills and fever.   Respiratory:  Negative for shortness of breath.    Cardiovascular:  Negative for chest pain and leg swelling.   Gastrointestinal:  Negative for abdominal pain, nausea and vomiting.   Neurological:  Negative for syncope.   Psychiatric/Behavioral:  Negative for confusion.         Past Medical and Surgical History:   Past Medical History:   Diagnosis Date    CKD     Diabetes mellitus (HCC)     Hyperlipidemia     Hypertension     Left toe amputee (HCC)     TIA (transient ischemic attack)        Past Surgical History:   Procedure Laterality Date    AMPUTATION Left     left foot resection 10 years ago dr tran    IR LOWER EXTREMITY ANGIOGRAM  08/29/2023    IR TEMPORARY DIALYSIS CATHETER PLACEMENT  08/25/2023    IR TUNNELED CENTRAL LINE REMOVAL  08/23/2023    IR TUNNELED DIALYSIS CATHETER PLACEMENT  01/27/2022    IR TUNNELED DIALYSIS CATHETER PLACEMENT  08/30/2023    NE AMPUTATION METATARSAL W/TOE SINGLE Right 8/31/2023    Procedure: RIGHT PARTIAL 1ST RAY RESECTION WITH  WOUND VAC APPLICATION;  Surgeon: Won Parmar DPM;  Location: [facility];  Service: Podiatry       Meds/Allergies:  Prior to Admission medications    Medication Sig Start Date End Date Taking? Authorizing Provider   allopurinol (ZYLOPRIM) 100 mg tablet Take 1 tablet (100 mg total) by mouth daily 3/22/24 11/23/24 Yes Lore Silver DO   docusate sodium (COLACE) 100 mg capsule Take 1 capsule (100 mg total) by mouth 2 (two) times a day 7/31/22  Yes Lore Silevr DO   acetaminophen (TYLENOL) 325 mg tablet Take 2 tablets (650 mg total) by mouth every 6 (six) hours as needed for mild pain, moderate pain, severe pain, headaches or fever  Patient not taking: Reported on 7/3/2024 3/22/24   Lore Silver DO   atorvastatin (LIPITOR) 80 mg tablet Take 80 mg by mouth daily 11/2/21 8/19/23  Historical Provider, MD   gabapentin (NEURONTIN) 100 mg capsule Take 2  capsules (200 mg total) by mouth 2 (two) times a day 3/22/24 4/21/24  Lore Silver DO   mupirocin (BACTROBAN) 2 % ointment Apply topically 3 (three) times a day 3/22/24   Lore Silver DO   naloxone (NARCAN) 4 mg/0.1 mL nasal spray Administer 1 spray into a nostril. If no response after 2-3 minutes, give another dose in the other nostril using a new spray. 7/16/22   Lore Silver DO   NIFEdipine ER (ADALAT CC) 30 MG 24 hr tablet Take 30 mg twice a day on Mon, Wed, Fri, Sun and take 30 mg once daily on Tue, Thurs, Sat  Patient not taking: Reported on 7/3/2024 3/22/24   Lore Silver DO   polyethylene glycol (MIRALAX) 17 g packet Take 17 g by mouth daily Do not start before September 7, 2023.  Patient not taking: Reported on 7/3/2024 9/7/23   Nallely Pisano PA-C   saccharomyces boulardii (FLORASTOR) 250 mg capsule Take 1 capsule (250 mg total) by mouth 2 (two) times a day  Patient not taking: Reported on 7/3/2024 3/22/24   Lore Silver DO   sevelamer (RENAGEL) 800 mg tablet Take 1 tablet (800 mg total) by mouth 3 (three) times a day with meals  Patient not taking: Reported on 7/3/2024 2/2/22   Lore Silver DO     I have reviewed home medications with patient personally.    Allergies: No Known Allergies    Social History:  Marital Status:    Patient Pre-hospital Living Situation: Home  Patient Pre-hospital Level of Mobility: walks  Patient Pre-hospital Diet Restrictions: diabetic  Substance Use History:   Social History     Substance and Sexual Activity   Alcohol Use Not Currently    Alcohol/week: 0.0 standard drinks of alcohol    Comment: 0     Social History     Tobacco Use   Smoking Status Never    Passive exposure: Never   Smokeless Tobacco Never     Social History     Substance and Sexual Activity   Drug Use Never       Family History:  History reviewed. No pertinent family history.    Physical Exam:     Vitals:   Blood Pressure: 163/65 (07/03/24 0208)  Pulse: 69 (07/03/24  "0208)  Temperature: 97.9 °F (36.6 °C) (07/03/24 0208)  Temp Source: Oral (07/02/24 2228)  Respirations: 18 (07/03/24 0021)  Height: 6' 1\" (185.4 cm) (07/02/24 1756)  SpO2: 96 % (07/03/24 0208)    Physical Exam  Constitutional:       General: He is not in acute distress.  HENT:      Mouth/Throat:      Mouth: Mucous membranes are moist.   Eyes:      General: No scleral icterus.  Cardiovascular:      Rate and Rhythm: Normal rate and regular rhythm.      Heart sounds: Normal heart sounds.   Pulmonary:      Breath sounds: Normal breath sounds.   Abdominal:      General: Abdomen is flat. Bowel sounds are normal.      Palpations: Abdomen is soft.   Musculoskeletal:      Right hip: Tenderness and bony tenderness present. Decreased range of motion.   Skin:     General: Skin is warm and dry.   Neurological:      General: No focal deficit present.      Mental Status: He is alert and oriented to person, place, and time.   Psychiatric:         Mood and Affect: Mood normal.          Additional Data:     Lab Results:  Results from last 7 days   Lab Units 07/03/24  0137 07/02/24  1804   WBC Thousand/uL  --  7.35   HEMOGLOBIN g/dL 13.6 14.1   HEMATOCRIT % 41.7 43.2   PLATELETS Thousands/uL  --  205   SEGS PCT %  --  66   LYMPHO PCT %  --  18   MONO PCT %  --  9   EOS PCT %  --  5     Results from last 7 days   Lab Units 07/02/24  1804   SODIUM mmol/L 130*   POTASSIUM mmol/L 4.5   CHLORIDE mmol/L 92*   CO2 mmol/L 20*   BUN mg/dL 69*   CREATININE mg/dL 11.37*   ANION GAP mmol/L 18*   CALCIUM mg/dL 8.2*   ALBUMIN g/dL 4.0   TOTAL BILIRUBIN mg/dL 0.44   ALK PHOS U/L 64   ALT U/L 11   AST U/L 12*   GLUCOSE RANDOM mg/dL 115         Results from last 7 days   Lab Units 07/02/24  2359   POC GLUCOSE mg/dl 115     Lab Results   Component Value Date    HGBA1C 5.4 03/12/2024    HGBA1C 6.6 (H) 07/07/2022    HGBA1C 6.6 (H) 01/24/2022           Lines/Drains:  Invasive Devices       Peripheral Intravenous Line  Duration             Peripheral IV " 07/02/24 Dorsal (posterior);Left Forearm <1 day                        Imaging: Reviewed radiology reports from this admission including: CT pelvis  CT pelvis wo contrast   Final Result by Ivan Verduzco MD (07/02 2047)      Right pelvic and bilateral sacral fractures as described.      The study was marked in EPIC for immediate notification.      Workstation performed: [id number]         XR hip/pelv 2-3 vws right if performed    (Results Pending)       EKG and Other Studies Reviewed on Admission:   EKG: No EKG obtained.    ** Please Note: This note has been constructed using a voice recognition system. **

## 2024-07-03 NOTE — ASSESSMENT & PLAN NOTE
Patient presents after mechanical fall landing on buttocks with right buttock pain.     CT pelvis shows right pelvic and bilateral sacral fractures. Specifically, right inferior pubic ramus fracture, the posterior fracture fragment is displaced approximately one shaft width medially, Nondisplaced right anterior acetabular fracture, Nondisplaced fracture through superior right iliac wing extending into a minimally displaced fracture of the right iliac body. Minimally displaced bilateral S1 sacral fractures.   D/W Ortho recs:  non-operative intervention   TTWB RLE/WBAT LLE  Heparin DVT ppx   Q6H H+H x 3-WNL  PRN analgesia   PT/OT recs  Ortho consult appreciated

## 2024-07-03 NOTE — WOUND OSTOMY CARE
Progress Note - Wound   Naresh Carpio 63 y.o. male MRN: [id number]  Unit/Bed#: [address] [place] [address] Encounter: [id number]      Assessment:   This is a 63 year old male patient admitted on 7/2/24 with closed fracture of right pelvis. He has history of ESRD on chronic hemodialysis, HTN, NIDDM 2 and wound consult received for toe wounds. Patient was AAO x 3 and agreeable to have wound visit done. He was placed on turning system and repositioned with assist of 4 persons. He stated that he does void urine - he is continent of stool.    Assessment Findings:  1-Bilateral feet and toes with no open areas - left foot has dry intact callus to submet 1 area. There was a retained suture noted to right dorsal foot - Provider and Primary RN notified.  2-Sacrobuttocks intact - orders in place for skin care and for prevention.  3-Bilateral heels intact - orders in place for skin care and for prevention.     Right dorsum with retained suture     Left plantar foot with dry intact callus     Right foot intact      Plan:   Skin care plans:  1-Hydraguard (Prevent barrier cream) to bilateral sacrum, buttock and heels BID and PRN  2-Float heels on 2 pillows to offload pressure so heels are not in contact with mattress or pillows.  3-Ehob pressure redistribution cushion in chair when out of bed if able. Avoid prolonged sitting.  4-Moisturize skin daily with skin nourishing cream.  5-Turn/reposition q2h or when medically stable for pressure re-distribution on skin.     Discussed assessment findings, and plan of care/recommendations with Marcelino RAWLS.    Wound care signing off, please call or tiger text with questions and concerns.    Recommendations written as orders.  Love Andrew MSN, RN, CWON

## 2024-07-03 NOTE — PLAN OF CARE
Target UF Goal  1  L as tolerated. Patient dialyzing for 4 hours on 3 K bath for serum K of  4.5  per protocol. Treatment plan reviewed with Nephrology.       Post-Dialysis RN Treatment Note    Blood Pressure:  Pre 143/72 mm/Hg  Post 164/83 mmHg   EDW  108 kg    Weight:  Pre 107.9 kg   Post 106.8 kg   Mode of weight measurement: Bed Scale   Volume Removed  1000 ml    Treatment duration 4 hours   NS given  No    Treatment shortened? Yes, describe: Treatment delays   Medications given during Rx None Reported   Estimated Kt/V  1.18   Access type: Permacath/TDC   Access Issues: No    Report called to primary nurse   Yes, Marcelino Schwartz            Problem: METABOLIC, FLUID AND ELECTROLYTES - ADULT  Goal: Electrolytes maintained within normal limits  Description: INTERVENTIONS:  - Monitor labs and assess patient for signs and symptoms of electrolyte imbalances  - Administer electrolyte replacement as ordered  - Monitor response to electrolyte replacements, including repeat lab results as appropriate  - Instruct patient on fluid and nutrition as appropriate  Outcome: Progressing  Goal: Fluid balance maintained  Description: INTERVENTIONS:  - Monitor labs   - Monitor I/O and WT  - Instruct patient on fluid and nutrition as appropriate  - Assess for signs & symptoms of volume excess or deficit  Outcome: Progressing

## 2024-07-03 NOTE — ASSESSMENT & PLAN NOTE
"CT pelvis shows Minimally displaced bilateral S1 sacral fractures   2/2 mechanical fall   Management as stated above under \"closed fracture of right pelvis\"   "

## 2024-07-03 NOTE — DISCHARGE INSTR - OTHER ORDERS
Plan:   Skin care plans:    1-Use Prevent barrier cream to bilateral sacrum, buttock and heels 2x/day and as needed.  2-Float heels on pillows in bed to offload pressure so heels are not in contact with mattress or pillows. If you cannot tolerate this, used a folded blanket or sheet.  3-Use pressure redistribution cushion in chair when out of bed if able. Avoid prolonged sitting.  4-Moisturize skin daily with skin nourishing cream.  5-Turn/reposition every 2 hrs for pressure re-distribution on skin.

## 2024-07-03 NOTE — CASE MANAGEMENT
Case Management Assessment & Discharge Planning Note    Patient name Naresh Carpio  [address] MRN [id number]  : 1960 Date 7/3/2024       Current Admission Date: 2024  Current Admission Diagnosis:Closed fracture of right pelvis ([facility])   Patient Active Problem List    Diagnosis Date Noted Date Diagnosed    Closed fracture of right pelvis ([facility]) 2024     Bilateral sacral fracture, closed ([facility]) 2024     Difficulty with speech 2024     History of amputation of hallux ([facility]) 2024     Essential hypertension 2024     Facial cellulitis 2024     Constipation 2023     Bradycardia 2023     Cellulitis of right foot      Polymicrobial bacterial infection 2023     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 mellitus, with necrosis of bone ([facility])      Acquired deformity of foot, right      Charcot's joint      Subacute osteomyelitis of right foot ([facility])      Open wound of right foot 2023     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 mellitus, with bone involvement without evidence of necrosis ([facility])      Diabetic ulcer of left midfoot associated with type 2 diabetes mellitus, limited to breakdown of skin ([facility])      Diabetic polyneuropathy associated with type 2 diabetes mellitus ([facility])      Diabetes mellitus type 2 with peripheral artery disease ([facility])      History of amputation of lesser toe of left foot ([facility])      Onychomycosis      Status post fall 2023     Traumatic rhabdomyolysis ([facility]) 2023     Leukocytosis 2023     Osteoarthritis of left hip 2022     Severe Left Orchalgia 2022     Right shoulder pain 2022     Left hip pain 2022     Hemodialysis status ([facility])      Hypervolemia      Anemia 2022     ESRD (end stage renal disease) ([facility]) 2022     Type 2 diabetes mellitus, without long-term current use of insulin ([facility])      Hypertension       History of TIA (transient ischemic attack)      T10 vertebral fracture (HCC)        LOS (days): 1  Geometric Mean LOS (GMLOS) (days): 4.6  Days to GMLOS:3.9     OBJECTIVE:    Risk of Unplanned Readmission Score: 30.93       Current admission status: Inpatient  Referral Reason: Other (Discharge planning)    Preferred Pharmacy:   [id number] - [place], [place] - [address]  [address] [place] [address]  Phone: [phone] Fax: [phone]    Primary Care Provider: No primary care provider on file.    Primary Insurance: MEDICARE  Secondary Insurance:     ASSESSMENT:  Active Health Care Proxies    There are no active Health Care Proxies on file.          Readmission Root Cause  30 Day Readmission: No    Patient Information  Admitted from:: Home  Mental Status: Alert  During Assessment patient was accompanied by: Not accompanied during assessment  Assessment information provided by:: Patient  Primary Caregiver: Self  Support Systems: Family members, Friend  County [facility] Residence: [facility]  What city do you live in?: [place]  Home entry access options. Select all that apply.: Stairs  Number of steps to enter home.:  (13 steps per PT evaluation)  Type of Current Residence: Apartment  Upon entering residence, is there a bedroom on the main floor (no further steps)?: Yes  Upon entering residence, is there a bathroom on the main floor (no further steps)?: Yes  Living Arrangements: Lives Alone (lives with sister)    Activities of Daily Living Prior to Admission  Functional Status: Independent  Completes ADLs independently?: Yes  Ambulates independently?: Yes  Does patient use assisted devices?: Yes  Assisted Devices (DME) used: Walker, Straight Cane  Does patient currently own DME?: Yes  What DME does the patient currently own?: Straight Cane, Walker  Does the patient have a history of Short-Term Rehab?: Yes ([facility])  Does patient have a history of HHC?: No  Does  patient currently have HHC?: No    Patient Information Continued  Income Source: SSI/SSD  Does patient receive dialysis treatments?: Yes ([facility] TTS, drives self)  Does patient have a history of substance abuse?: No  Does patient have a history of Mental Health Diagnosis?: No    Means of Transportation  Means of Transport to Appts:: Drives Self      Social Determinants of Health (SDOH)      Flowsheet Row Most Recent Value   Housing Stability    In the last 12 months, was there a time when you were not able to pay the mortgage or rent on time? N   At any time in the past 12 months, were you homeless or living in a shelter (including now)? N   Transportation Needs    In the past 12 months, has lack of transportation kept you from medical appointments or from getting medications? no   In the past 12 months, has lack of transportation kept you from meetings, work, or from getting things needed for daily living? No   Food Insecurity    Within the past 12 months, you worried that your food would run out before you got the money to buy more. Never true   Within the past 12 months, the food you bought just didn't last and you didn't have money to get more. Never true   Utilities    In the past 12 months has the electric, gas, oil, or water company threatened to shut off services in your home? No            DISCHARGE DETAILS:    Discharge planning discussed with:: Patient  Freedom of Choice: Yes    Comments - Freedom of Choice: SW spoke with patient at bedside to conduct assessment and discuss discharge planning.  Patient has been assessed at Level II for rehabilitation needs and STR is recommended.  Patient is in agreement with this recommendation and expressed preference for [facility] as he has been there in the past and outpatient HD can easily be arranged with a nearby clinic.  His home clinic is [facility] on a TTS schedule.    [facility] faxed referral to [facility] at [phone]  (e-fax) and spoke with Alessandra in admissions [phone].  Alessandra confirmed that a bed is available for patient.  Her system indicated that patient has [employer]vantage as his primary coverage but per the Inpatient Insurance Verifiers team and patient himself, he has only Medicare.  [facility] called Alessandra back to notify her and faxed a copy of the insurance verification report.      While patient is in [facility], outpatient HD will be arranged by [facility] with [facility] in [place] which is about a mile away.  [facility] will arrange transportation and submit it to insurance.  Facility does not use [facility] so communication will need to continue via phone and fax.  SW will continue to follow.      CM contacted family/caregiver?: Yes (Voice mail left for ex-wife/friend Mercedes)  Were Treatment Team discharge recommendations reviewed with patient/caregiver?: Yes  Did patient/caregiver verbalize understanding of patient care needs?: Yes  Were patient/caregiver advised of the risks associated with not following Treatment Team discharge recommendations?: Yes    Contacts  Patient Contacts: Mercedes Resendez (ex-wife)  Relationship to Patient:: Friend  Contact Method: Phone  Phone Number: [phone]  Reason/Outcome: Emergency Contact    Requested Home Health Care         Is the patient interested in HHC at discharge?: No    DME Referral Provided  Referral made for DME?: No    Other Referral/Resources/Interventions Provided:  Interventions: Short Term Rehab    Would you like to participate in our [facility] service program?  : No - Declined    Treatment Team Recommendation: Short Term Rehab  Discharge Destination Plan:: Short Term Rehab  Transport at Discharge : Domonique Stephens (Date):: 07/02/24

## 2024-07-03 NOTE — ASSESSMENT & PLAN NOTE
Lab Results   Component Value Date    HGBA1C 5.4 03/12/2024       Recent Labs     07/02/24  2359   POCGLU 115       Blood Sugar Average: Last 72 hrs:  (P) 115    SSI with accuchecks

## 2024-07-03 NOTE — PROGRESS NOTES
24 1200   Clinical Encounter Type   Visited With Patient   Routine Visit Introduction   Referral From Nurse      Pastoral Care Progress Note    7/3/2024  Patient: Naresh Carpio : 1960  Admission Date & Time: 2024 1736  MRN: [id number] CSN: [id number]         introductory visit. Naresh said he had too much pain to meet but appreciated the visit.  remains available.

## 2024-07-04 ENCOUNTER — APPOINTMENT (INPATIENT)
Dept: DIALYSIS | Facility: HOSPITAL | Age: 64
DRG: 551 | End: 2024-07-04
Payer: MEDICARE

## 2024-07-04 LAB
ANION GAP SERPL CALCULATED.3IONS-SCNC: 13 MMOL/L (ref 4–13)
BASOPHILS # BLD AUTO: 0.03 THOUSANDS/ÂΜL (ref 0–0.1)
BASOPHILS NFR BLD AUTO: 0 % (ref 0–1)
BUN SERPL-MCNC: 39 MG/DL (ref 5–25)
CALCIUM SERPL-MCNC: 8.4 MG/DL (ref 8.4–10.2)
CHLORIDE SERPL-SCNC: 96 MMOL/L (ref 96–108)
CO2 SERPL-SCNC: 21 MMOL/L (ref 21–32)
CREAT SERPL-MCNC: 8 MG/DL (ref 0.6–1.3)
EOSINOPHIL # BLD AUTO: 0.18 THOUSAND/ÂΜL (ref 0–0.61)
EOSINOPHIL NFR BLD AUTO: 2 % (ref 0–6)
ERYTHROCYTE [DISTWIDTH] IN BLOOD BY AUTOMATED COUNT: 15.8 % (ref 11.6–15.1)
GFR SERPL CREATININE-BSD FRML MDRD: 6 ML/MIN/1.73SQ M
GLUCOSE SERPL-MCNC: 117 MG/DL (ref 65–140)
GLUCOSE SERPL-MCNC: 123 MG/DL (ref 65–140)
GLUCOSE SERPL-MCNC: 128 MG/DL (ref 65–140)
GLUCOSE SERPL-MCNC: 149 MG/DL (ref 65–140)
GLUCOSE SERPL-MCNC: 150 MG/DL (ref 65–140)
HCT VFR BLD AUTO: 41.2 % (ref 36.5–49.3)
HGB BLD-MCNC: 13.3 G/DL (ref 12–17)
IMM GRANULOCYTES # BLD AUTO: 0.06 THOUSAND/UL (ref 0–0.2)
IMM GRANULOCYTES NFR BLD AUTO: 1 % (ref 0–2)
LYMPHOCYTES # BLD AUTO: 1.07 THOUSANDS/ÂΜL (ref 0.6–4.47)
LYMPHOCYTES NFR BLD AUTO: 11 % (ref 14–44)
MCH RBC QN AUTO: 29.4 PG (ref 26.8–34.3)
MCHC RBC AUTO-ENTMCNC: 32.3 G/DL (ref 31.4–37.4)
MCV RBC AUTO: 91 FL (ref 82–98)
MONOCYTES # BLD AUTO: 1.07 THOUSAND/ÂΜL (ref 0.17–1.22)
MONOCYTES NFR BLD AUTO: 11 % (ref 4–12)
MRSA NOSE QL CULT: NORMAL
NEUTROPHILS # BLD AUTO: 7.53 THOUSANDS/ÂΜL (ref 1.85–7.62)
NEUTS SEG NFR BLD AUTO: 75 % (ref 43–75)
NRBC BLD AUTO-RTO: 0 /100 WBCS
PLATELET # BLD AUTO: 201 THOUSANDS/UL (ref 149–390)
PMV BLD AUTO: 10.3 FL (ref 8.9–12.7)
POTASSIUM SERPL-SCNC: 4.8 MMOL/L (ref 3.5–5.3)
RBC # BLD AUTO: 4.52 MILLION/UL (ref 3.88–5.62)
SODIUM SERPL-SCNC: 130 MMOL/L (ref 135–147)
WBC # BLD AUTO: 9.94 THOUSAND/UL (ref 4.31–10.16)

## 2024-07-04 PROCEDURE — 82948 REAGENT STRIP/BLOOD GLUCOSE: CPT

## 2024-07-04 PROCEDURE — 80048 BASIC METABOLIC PNL TOTAL CA: CPT | Performed by: STUDENT IN AN ORGANIZED HEALTH CARE EDUCATION/TRAINING PROGRAM

## 2024-07-04 PROCEDURE — 97110 THERAPEUTIC EXERCISES: CPT

## 2024-07-04 PROCEDURE — 99232 SBSQ HOSP IP/OBS MODERATE 35: CPT | Performed by: STUDENT IN AN ORGANIZED HEALTH CARE EDUCATION/TRAINING PROGRAM

## 2024-07-04 PROCEDURE — 85025 COMPLETE CBC W/AUTO DIFF WBC: CPT | Performed by: STUDENT IN AN ORGANIZED HEALTH CARE EDUCATION/TRAINING PROGRAM

## 2024-07-04 PROCEDURE — 99232 SBSQ HOSP IP/OBS MODERATE 35: CPT | Performed by: INTERNAL MEDICINE

## 2024-07-04 RX ORDER — OXYCODONE HYDROCHLORIDE 5 MG/1
5 TABLET ORAL EVERY 6 HOURS
Status: DISCONTINUED | OUTPATIENT
Start: 2024-07-04 | End: 2024-07-04

## 2024-07-04 RX ORDER — HYDROMORPHONE HCL/PF 1 MG/ML
1 SYRINGE (ML) INJECTION EVERY 4 HOURS PRN
Status: COMPLETED | OUTPATIENT
Start: 2024-07-04 | End: 2024-07-04

## 2024-07-04 RX ORDER — OXYCODONE HYDROCHLORIDE 10 MG/1
10 TABLET ORAL EVERY 6 HOURS
Status: COMPLETED | OUTPATIENT
Start: 2024-07-04 | End: 2024-07-04

## 2024-07-04 RX ADMIN — OXYCODONE HYDROCHLORIDE 5 MG: 5 TABLET ORAL at 05:25

## 2024-07-04 RX ADMIN — HYDROMORPHONE HYDROCHLORIDE 0.5 MG: 1 INJECTION, SOLUTION INTRAMUSCULAR; INTRAVENOUS; SUBCUTANEOUS at 01:24

## 2024-07-04 RX ADMIN — GABAPENTIN 200 MG: 100 CAPSULE ORAL at 11:54

## 2024-07-04 RX ADMIN — SEVELAMER HYDROCHLORIDE 800 MG: 800 TABLET, FILM COATED PARENTERAL at 08:22

## 2024-07-04 RX ADMIN — SEVELAMER HYDROCHLORIDE 800 MG: 800 TABLET, FILM COATED PARENTERAL at 17:03

## 2024-07-04 RX ADMIN — ACETAMINOPHEN 650 MG: 325 TABLET ORAL at 11:54

## 2024-07-04 RX ADMIN — OXYCODONE HYDROCHLORIDE 10 MG: 10 TABLET ORAL at 13:54

## 2024-07-04 RX ADMIN — HYDROMORPHONE HYDROCHLORIDE 1 MG: 1 INJECTION, SOLUTION INTRAMUSCULAR; INTRAVENOUS; SUBCUTANEOUS at 10:03

## 2024-07-04 RX ADMIN — HEPARIN SODIUM 5000 UNITS: 5000 INJECTION, SOLUTION INTRAVENOUS; SUBCUTANEOUS at 05:25

## 2024-07-04 RX ADMIN — ATORVASTATIN CALCIUM 80 MG: 80 TABLET, FILM COATED ORAL at 17:04

## 2024-07-04 RX ADMIN — NIFEDIPINE 30 MG: 30 TABLET, EXTENDED RELEASE ORAL at 17:04

## 2024-07-04 RX ADMIN — HEPARIN SODIUM 5000 UNITS: 5000 INJECTION, SOLUTION INTRAVENOUS; SUBCUTANEOUS at 13:54

## 2024-07-04 RX ADMIN — HYDROMORPHONE HYDROCHLORIDE 1 MG: 1 INJECTION, SOLUTION INTRAMUSCULAR; INTRAVENOUS; SUBCUTANEOUS at 23:58

## 2024-07-04 RX ADMIN — GABAPENTIN 200 MG: 100 CAPSULE ORAL at 17:03

## 2024-07-04 RX ADMIN — SEVELAMER HYDROCHLORIDE 800 MG: 800 TABLET, FILM COATED PARENTERAL at 11:54

## 2024-07-04 RX ADMIN — HYDROMORPHONE HYDROCHLORIDE 0.5 MG: 1 INJECTION, SOLUTION INTRAMUSCULAR; INTRAVENOUS; SUBCUTANEOUS at 06:23

## 2024-07-04 RX ADMIN — OXYCODONE HYDROCHLORIDE 10 MG: 10 TABLET ORAL at 19:46

## 2024-07-04 RX ADMIN — HEPARIN SODIUM 5000 UNITS: 5000 INJECTION, SOLUTION INTRAVENOUS; SUBCUTANEOUS at 21:27

## 2024-07-04 RX ADMIN — OXYCODONE HYDROCHLORIDE 5 MG: 5 TABLET ORAL at 08:22

## 2024-07-04 RX ADMIN — DOCUSATE SODIUM 100 MG: 100 CAPSULE, LIQUID FILLED ORAL at 17:04

## 2024-07-04 RX ADMIN — ALLOPURINOL 100 MG: 100 TABLET ORAL at 11:54

## 2024-07-04 RX ADMIN — HYDROMORPHONE HYDROCHLORIDE 1 MG: 1 INJECTION, SOLUTION INTRAMUSCULAR; INTRAVENOUS; SUBCUTANEOUS at 14:55

## 2024-07-04 RX ADMIN — INSULIN LISPRO 1 UNITS: 100 INJECTION, SOLUTION INTRAVENOUS; SUBCUTANEOUS at 21:27

## 2024-07-04 RX ADMIN — DOCUSATE SODIUM 100 MG: 100 CAPSULE, LIQUID FILLED ORAL at 11:54

## 2024-07-04 NOTE — CONSULTS
Podiatry - Consultation    Patient Information:   Naresh Carpio 63 y.o. male MRN: [id number]  Unit/Bed#: [address] [place] [address] Encounter: [id number]  PCP: No primary care provider on file.  Date of Admission:  7/2/2024  Date of Consultation: 07/03/24  Requesting Physician: Angie Bernard MD      ASSESSMENT:    Naresh Carpio is a 63 y.o. male with:    Callus of left foot x 2  Retained sutures, right foot  Type 2 diabetes with peripheral neuropathy  ESRD  History of bilateral pedal amputations    PLAN:    Sutures to the right foot removed at bedside today without incident  Left foot hyperkeratotic tissue submet 1 and submet 5 was pared utilizing a #10 blade without incident to level of healthy epidermis.  I did discuss with the patient the importance of wearing good supportive sneakers with custom molded diabetic insoles when he is discharged from the hospital.  No additional need for imaging or additional podiatric care at this time.  Podiatry signing off.  Please reconsult our team should any new podiatric concerns arise.    Weightbearing status: Weightbearing as tolerated    SUBJECTIVE:    History of Present Illness:    Naresh Carpio is a 63 y.o. male who is originally admitted 7/2/2024 due to a closed fracture of his right pelvis. Patient has a past medical history of type 2 diabetes, hypertension, ESRD, multiple pedal amputations.    We are consulted for evaluation and management of the patient's bilateral feet.  The patient states that he has had sutures in his right foot since Memorial Day and states that they were taken out, however some remain and were noticed today.  He also states that he is concerned for his left foot as he is developing calluses to the area, although these are not painful, he would like to ensure that they do not become wounds.  He denies any other pedal complaints.    Review of Systems:    Constitutional: Negative.    HENT: Negative.    Eyes: Negative.    Respiratory: Negative.     Cardiovascular: Negative.    Gastrointestinal: Negative.    Musculoskeletal: History of right partial first ray amputation  Skin: Hyperkeratotic tissue, left foot  Neurological: Peripheral neuropathy  Psych: Negative.     Past Medical and Surgical History:     Past Medical History:   Diagnosis Date    CKD     Diabetes mellitus (HCC)     Hyperlipidemia     Hypertension     Left toe amputee (HCC)     TIA (transient ischemic attack)        Past Surgical History:   Procedure Laterality Date    AMPUTATION Left     left foot resection 10 years ago dr tran    IR LOWER EXTREMITY ANGIOGRAM  08/29/2023    IR TEMPORARY DIALYSIS CATHETER PLACEMENT  08/25/2023    IR TUNNELED CENTRAL LINE REMOVAL  08/23/2023    IR TUNNELED DIALYSIS CATHETER PLACEMENT  01/27/2022    IR TUNNELED DIALYSIS CATHETER PLACEMENT  08/30/2023    MS AMPUTATION METATARSAL W/TOE SINGLE Right 8/31/2023    Procedure: RIGHT PARTIAL 1ST RAY RESECTION WITH  WOUND VAC APPLICATION;  Surgeon: Won Parmar DPM;  Location: [facility];  Service: Podiatry       Meds/Allergies:      Medications Prior to Admission:     allopurinol (ZYLOPRIM) 100 mg tablet    docusate sodium (COLACE) 100 mg capsule    acetaminophen (TYLENOL) 325 mg tablet    atorvastatin (LIPITOR) 80 mg tablet    gabapentin (NEURONTIN) 100 mg capsule    mupirocin (BACTROBAN) 2 % ointment    naloxone (NARCAN) 4 mg/0.1 mL nasal spray    NIFEdipine ER (ADALAT CC) 30 MG 24 hr tablet    polyethylene glycol (MIRALAX) 17 g packet    saccharomyces boulardii (FLORASTOR) 250 mg capsule    sevelamer (RENAGEL) 800 mg tablet    No Known Allergies    Social History:     Marital Status:     Substance Use History:   Social History     Substance and Sexual Activity   Alcohol Use Not Currently    Alcohol/week: 0.0 standard drinks of alcohol    Comment: 0     Social History     Tobacco Use   Smoking Status Never    Passive exposure: Never   Smokeless Tobacco Never     Social History     Substance and Sexual  "Activity   Drug Use Never       Family History:    History reviewed. No pertinent family history.      OBJECTIVE:    Vitals:   Blood Pressure: 156/63 (07/03/24 1838)  Pulse: 76 (07/03/24 1838)  Temperature: 98 °F (36.7 °C) (07/03/24 1838)  Temp Source: Tympanic (07/03/24 1740)  Respirations: 16 (07/03/24 1740)  Height: 6' 1\" (185.4 cm) (07/03/24 1440)  Weight - Scale: 108 kg (237 lb 1.6 oz) (07/03/24 0441)  SpO2: 95 % (07/03/24 1838)    Physical Exam:    General Appearance: Alert, cooperative, no distress.  HEENT: Head normocephalic, atraumatic, without obvious abnormality.  Heart: Normal rate and rhythm.  Lungs: Non-labored breathing. No respiratory distress.  Abdomen: Without distension.  Psychiatric: AAOx3  Lower Extremity:    Vascular:   DP: Right: non-palpable Left: non-palpable  PT: Right: non-palpable Left: non-palpable  CRT < 3 seconds at the digits. +0/4 edema noted at bilateral lower extremities.   Pedal hair is absent.   Skin temperature is WNL bilaterally.    Musculoskeletal:  MMT is 4/5 in all muscle compartments bilaterally.   ROM at the 1st MPJ and ankle joint are decreased bilaterally with the leg extended.   No Pain on palpation of the bilateral foot.   No gross deformities noted.     Dermatological:  2 retained sutures to the right medial foot noted.  Both were removed without incident.  I do note hyperkeratotic tissue to the left plantar foot submetatarsal 1 and submetatarsal 5.  There is no wounds, openings, or any local signs of infection to the bilateral feet.    Neurological:  Gross sensation is absent.   Light touch is absent.   Protective sensation is absent.    Additional data:     Lab Results: I have personally reviewed pertinent labs including:    Results from last 7 days   Lab Units 07/03/24  0436   WBC Thousand/uL 7.68   HEMOGLOBIN g/dL 13.5   HEMATOCRIT % 41.2   PLATELETS Thousands/uL 205   SEGS PCT % 72   LYMPHO PCT % 16   MONO PCT % 9   EOS PCT % 2     Results from last 7 days   Lab " "Units 07/03/24  0436 07/02/24  1804   POTASSIUM mmol/L 4.5 4.5   CHLORIDE mmol/L 90* 92*   CO2 mmol/L 26 20*   BUN mg/dL 72* 69*   CREATININE mg/dL 11.51* 11.37*   CALCIUM mg/dL 8.1* 8.2*   ALK PHOS U/L  --  64   ALT U/L  --  11   AST U/L  --  12*           Cultures: I have personally reviewed pertinent cultures including:              Imaging: I have personally reviewed pertinent reports in PACS.  EKG, Pathology, and Other Studies: I have personally reviewed pertinent reports.    Time Spent for Care: 30 minutes.  More than 50% of total time spent on counseling and coordination of care as described above.      ** Please Note: Portions of the record may have been created with voice recognition software. Occasional wrong word or \"sound a like\" substitutions may have occurred due to the inherent limitations of voice recognition software. Read the chart carefully and recognize, using context, where substitutions have occurred. **   "

## 2024-07-04 NOTE — PHYSICAL THERAPY NOTE
" PT TREATMENT     07/04/24 1401   PT Last Visit   PT Visit Date 07/04/24   Note Type   Note Type Treatment   Pain Assessment   Pain Assessment Tool 0-10   Pain Score 10 - Worst Possible Pain  (R thigh, hip, LS area (nurse aware and medicating))   Restrictions/Precautions   RLE Weight Bearing Per Order (S)  TTWB   LLE Weight Bearing Per Order WBAT   Other Precautions Chair Alarm;Bed Alarm;Fall Risk;Pain  (R pelvic fractures, B sacral fractures)   General   Chart Reviewed Yes   Family/Caregiver Present No   Cognition   Arousal/Participation Cooperative  (with encouragement)   Subjective   Subjective \"I can not stand and won't!\"   Bed Mobility   Sit to Supine 2  Maximal assistance   Additional items Assist x 1;Verbal cues;LE management   Additional Comments patient sitting on bed edge with nursing staff upon arrival by therapist. Patient resistant to mobility, yelling at therapist that maybe he can stand up tomorrow. Even with education, encouragement and completion of exercise, patient was unwilling to transfer to a recliner or even attempt standing at this time.   Balance   Static Sitting Fair +   Dynamic Sitting Fair -   Activity Tolerance   Activity Tolerance Patient limited by fatigue;Patient limited by pain  (limited by anxiousness)   Nurse Made Aware yes   Exercises   Hip Flexion Sitting;10 reps;Left   Hip Abduction Sitting;10 reps;Left   Knee AROM Long Arc Quad Sitting;10 reps;Bilateral   Ankle Pumps Sitting;10 reps;Bilateral  (as ankle ROM allowed with charcot joint L LE)   Balance training  sitting balance activity completed with weight shifting and scooting on bed edge as tolerated   Assessment   Assessment Patient anxious with mobility and pain. Yelling at therapist at times with attempts to encourage increasing functional mobility. Patient educated in benefits and importance of mobility and encouragement to prepare for standing activity next session. Patient will benefit from continued PT with progression " as tolerated and increasing functional mobility with clinical staff as well. The patient's [facility] Basic Mobility Inpatient Short Form Raw Score is 6. A Raw score of less than or equal to 16 suggests the patient may benefit from discharge to post-acute rehabilitation services. Please also refer to the recommendation of the Physical Therapist for safe discharge planning.         Plan   Treatment/Interventions ADL retraining;Functional transfer training;LE strengthening/ROM;Therapeutic exercise;Endurance training;Patient/family training;Equipment eval/education;Bed mobility;Gait training;Compensatory technique education   PT Frequency Other (Comment)  (daily)   Discharge Recommendation   Rehab Resource Intensity Level, PT II (Moderate Resource Intensity)   AM-PAC Basic Mobility Inpatient   Turning in Flat Bed Without Bedrails 1   Lying on Back to Sitting on Edge of Flat Bed Without Bedrails 1   Moving Bed to Chair 1   Standing Up From Chair Using Arms 1   Walk in Room 1   Climb 3-5 Stairs With Railing 1   Basic Mobility Inpatient Raw Score 6   Turning Head Towards Sound 4   Follow Simple Instructions 3   Low Function Basic Mobility Raw Score  13   Low Function Basic Mobility Standardized Score  20.14   [facility] Highest Level Of Mobility   JH-HLM Goal 2: Bed activities/Dependent transfer   JH-HLM Achieved 3: Sit at edge of bed   Education   Patient Reinforcement needed   Licensure   [place] License Number  Anat Lukasz PT [id number]

## 2024-07-04 NOTE — ASSESSMENT & PLAN NOTE
Lab Results   Component Value Date    HGBA1C 6.2 (H) 07/02/2024         SSI with accuchecks  Encourage diet control on discharge and close follow-up with PCP for consideration of initiation of metformin

## 2024-07-04 NOTE — PLAN OF CARE
Problem: PAIN - ADULT  Goal: Verbalizes/displays adequate comfort level or baseline comfort level  Description: Interventions:  - Encourage patient to monitor pain and request assistance  - Assess pain using appropriate pain scale  - Administer analgesics based on type and severity of pain and evaluate response  - Implement non-pharmacological measures as appropriate and evaluate response  - Consider cultural and social influences on pain and pain management  - Notify physician/advanced practitioner if interventions unsuccessful or patient reports new pain  Outcome: Progressing     Problem: INFECTION - ADULT  Goal: Absence or prevention of progression during hospitalization  Description: INTERVENTIONS:  - Assess and monitor for signs and symptoms of infection  - Monitor lab/diagnostic results  - Monitor all insertion sites, i.e. indwelling lines, tubes, and drains  - Monitor endotracheal if appropriate and nasal secretions for changes in amount and color  - [place] appropriate cooling/warming therapies per order  - Administer medications as ordered  - Instruct and encourage patient and family to use good hand hygiene technique  - Identify and instruct in appropriate isolation precautions for identified infection/condition  Outcome: Progressing  Goal: Absence of fever/infection during neutropenic period  Description: INTERVENTIONS:  - Monitor WBC    Outcome: Progressing     Problem: SAFETY ADULT  Goal: Patient will remain free of falls  Description: INTERVENTIONS:  - Educate patient/family on patient safety including physical limitations  - Instruct patient to call for assistance with activity   - Consult OT/PT to assist with strengthening/mobility   - Keep Call bell within reach  - Keep bed low and locked with side rails adjusted as appropriate  - Keep care items and personal belongings within reach  - Initiate and maintain comfort rounds  - Make Fall Risk Sign visible to staff  - Offer Toileting every 2 Hours,  in advance of need  - Initiate/Maintain bed 3 alarm  - Obtain necessary fall risk management equipment: fall risk sign on door  - Apply yellow socks and bracelet for high fall risk patients  - Consider moving patient to room near nurses station  Outcome: Progressing  Goal: Maintain or return to baseline ADL function  Description: INTERVENTIONS:  -  Assess patient's ability to carry out ADLs; assess patient's baseline for ADL function and identify physical deficits which impact ability to perform ADLs (bathing, care of mouth/teeth, toileting, grooming, dressing, etc.)  - Assess/evaluate cause of self-care deficits   - Assess range of motion  - Assess patient's mobility; develop plan if impaired  - Assess patient's need for assistive devices and provide as appropriate  - Encourage maximum independence but intervene and supervise when necessary  - Involve family in performance of ADLs  - Assess for home care needs following discharge   - Consider OT consult to assist with ADL evaluation and planning for discharge  - Provide patient education as appropriate  Outcome: Progressing  Goal: Maintains/Returns to pre admission functional level  Description: INTERVENTIONS:  - Perform AM-PAC 6 Click Basic Mobility/ Daily Activity assessment daily.  - Set and communicate daily mobility goal to care team and patient/family/caregiver.   - Collaborate with rehabilitation services on mobility goals if consulted  - Perform Range of Motion 2 times a day.  - Reposition patient every 2 hours.  - Dangle patient 2 times a day  - Stand patient 2 times a day  - Ambulate patient 3 times a day  - Out of bed to chair 3 times a day   - Out of bed for meals 3 times a day  - Out of bed for toileting  - Record patient progress and toleration of activity level   Outcome: Progressing     Problem: MUSCULOSKELETAL - ADULT  Goal: Maintain or return mobility to safest level of function  Description: INTERVENTIONS:  - Assess patient's ability to carry out  ADLs; assess patient's baseline for ADL function and identify physical deficits which impact ability to perform ADLs (bathing, care of mouth/teeth, toileting, grooming, dressing, etc.)  - Assess/evaluate cause of self-care deficits   - Assess range of motion  - Assess patient's mobility  - Assess patient's need for assistive devices and provide as appropriate  - Encourage maximum independence but intervene and supervise when necessary  - Involve family in performance of ADLs  - Assess for home care needs following discharge   - Consider OT consult to assist with ADL evaluation and planning for discharge  - Provide patient education as appropriate  Outcome: Progressing     Problem: Prexisting or High Potential for Compromised Skin Integrity  Goal: Skin integrity is maintained or improved  Description: INTERVENTIONS:  - Identify patients at risk for skin breakdown  - Assess and monitor skin integrity  - Assess and monitor nutrition and hydration status  - Monitor labs   - Assess for incontinence   - Turn and reposition patient  - Assist with mobility/ambulation  - Relieve pressure over bony prominences  - Avoid friction and shearing  - Provide appropriate hygiene as needed including keeping skin clean and dry  - Evaluate need for skin moisturizer/barrier cream  - Collaborate with interdisciplinary team   - Patient/family teaching  - Consider wound care consult   Outcome: Progressing     Problem: METABOLIC, FLUID AND ELECTROLYTES - ADULT  Goal: Electrolytes maintained within normal limits  Description: INTERVENTIONS:  - Monitor labs and assess patient for signs and symptoms of electrolyte imbalances  - Administer electrolyte replacement as ordered  - Monitor response to electrolyte replacements, including repeat lab results as appropriate  - Instruct patient on fluid and nutrition as appropriate  Outcome: Progressing  Goal: Fluid balance maintained  Description: INTERVENTIONS:  - Monitor labs   - Monitor I/O and WT  -  Instruct patient on fluid and nutrition as appropriate  - Assess for signs & symptoms of volume excess or deficit  Outcome: Progressing

## 2024-07-04 NOTE — PLAN OF CARE
Target UF Goal  0.5  L as tolerated. Patient dialyzing for  3.5 hours  on 2 K bath for serum K of  4.8  per protocol. Treatment plan reviewed with Dr. Mack.   Problem: METABOLIC, FLUID AND ELECTROLYTES - ADULT  Goal: Electrolytes maintained within normal limits  Description: INTERVENTIONS:  - Monitor labs and assess patient for signs and symptoms of electrolyte imbalances  - Administer electrolyte replacement as ordered  - Monitor response to electrolyte replacements, including repeat lab results as appropriate  - Instruct patient on fluid and nutrition as appropriate  Outcome: Progressing  Goal: Fluid balance maintained  Description: INTERVENTIONS:  - Monitor labs   - Monitor I/O and WT  - Instruct patient on fluid and nutrition as appropriate  - Assess for signs & symptoms of volume excess or deficit  Outcome: Progressing   Post-Dialysis RN Treatment Note    Blood Pressure:  Pre 131/58 mm/Hg  Post 150/71 mmHg   EDW  108 kg    Weight:  Pre 106.2 kg   Post 105.7 kg   Mode of weight measurement: Bed Scale   Volume Removed  500 ml    Treatment duration 210 minutes    NS given  No    Treatment shortened? No   Medications given during Rx None Reported   Estimated Kt/V  1.07   Access type: Permacath/TDC   Access Issues: No    Report called to primary nurse   Yes Munira Garza RN

## 2024-07-04 NOTE — ASSESSMENT & PLAN NOTE
Lab Results   Component Value Date    EGFR 5 07/06/2024    EGFR 8 07/05/2024    EGFR 6 07/04/2024    CREATININE 9.00 (H) 07/06/2024    CREATININE 6.53 (H) 07/05/2024    CREATININE 8.00 (H) 07/04/2024     HD TTS at [facility]; [facility] [email or web address]   Missed HD on Saturday due to illness and today (Tuesday) due to fall   no hyperkalemia, uremic sx, fluid overload   D/W nephro recs:  HD TTS schedule

## 2024-07-04 NOTE — ASSESSMENT & PLAN NOTE
Patient presents after mechanical fall landing on buttocks with right buttock pain.     CT pelvis shows right pelvic and bilateral sacral fractures. Specifically, right inferior pubic ramus fracture, the posterior fracture fragment is displaced approximately one shaft width medially, Nondisplaced right anterior acetabular fracture, Nondisplaced fracture through superior right iliac wing extending into a minimally displaced fracture of the right iliac body. Minimally displaced bilateral S1 sacral fractures.  OOB, encourage ambulation per Ortho recs  Scheduled/PRN analgesia unresolved  D/W Ortho recs:  non-operative intervention   TTWB RLE/WBAT LLE  remain on DVT prophylaxis for the next six weeks.   Follow up one to two weeks after discharge for repeat x-rays.

## 2024-07-04 NOTE — PROGRESS NOTES
NEPHROLOGY HOSPITAL PROGRESS NOTE   Naresh Carpio 63 y.o. male MRN: [id number]  Unit/Bed#: [address] [place] [address] Encounter: [id number]  Reason for Consult: ESRD on HD    ASSESSMENT and PLAN:  ESRD on HD (Carlos Lancaster, KEATON):  Got HD yesterday (Wed) due to missed dialysis on Saturday and Tuesday prior to admission.   Plan for HD today to place back on TTS schedule.      Access: Right IJ PermCath.     Hypertension:  BP now controlled.    kg.  He was down to 106.8 kg yesterday.  Home Rx: None.   Current Rx: Nifedipine 30 mg OD  No changes.   0.5 kg UF on HD.      Anemia:  Hemoglobin at goal.  Stable. 13.3     Mineral and bone disease:  OP Rx: Sevelamer and Velphoro   Phos is above goal.   Continue sevelamer 800 mg TID in the hospital.  Restart Velphoro on discharge.   Low phos diet.      Hyponatremia:  Na 130  Tighten fluid restriction to 1.5 L per 24 hours.    Right pelvic and bilateral sacral fractures  Fall    PLAN SUMMARY:  HD today.  0.5 kg UF on HD.  1.5 liter fluid restriction.     SUBJECTIVE / 24H INTERVAL HISTORY:  Complaining of pain in the area of where the pelvic fracture is.  Denies any CP or SOB.  No major issues with dialysis yesterday.    OBJECTIVE:  Current Weight: Weight - Scale: 107 kg (236 lb 4.8 oz)  Vitals:    07/04/24 0802 07/04/24 0830 07/04/24 0900 07/04/24 0930   BP: 131/58 135/56 147/62 132/66   BP Location:       Pulse: 72 64 69 72   Resp: 20 18 18    Temp:       TempSrc:       SpO2: 92% 92% 94% 93%   Weight:       Height:           Intake/Output Summary (Last 24 hours) at 7/4/2024 0935  Last data filed at 7/4/2024 0802  Gross per 24 hour   Intake 1740 ml   Output 2101 ml   Net -361 ml     General: conscious, cooperative, no distress  Skin: dry  Eyes: pink conjunctivae  ENT: moist mucous membranes  Respiratory: equal chest expansion, clear breath sounds.  Cardiovascular: distinct heart sounds, normal rate, regular rhythm, no rub  Abdomen: soft, non tender, non distended, normal bowel  sounds  Extremities: no edema.   Genitourinary: no gaitan catheter.   Neuro: awake, alert.   Psych: appropriate affect    Medications:    Current Facility-Administered Medications:     acetaminophen (TYLENOL) tablet 650 mg, 650 mg, Oral, Q6H PRN, Niharika Russell PA-C    allopurinol (ZYLOPRIM) tablet 100 mg, 100 mg, Oral, Daily, Niharika Russell PA-C, 100 mg at 07/03/24 0812    atorvastatin (LIPITOR) tablet 80 mg, 80 mg, Oral, Daily With Dinner, Niharika Russell PA-C, 80 mg at 07/03/24 1753    docusate sodium (COLACE) capsule 100 mg, 100 mg, Oral, BID, Niharika Russell PA-C, 100 mg at 07/03/24 1753    gabapentin (NEURONTIN) capsule 200 mg, 200 mg, Oral, BID, Niharika Russell PA-C, 200 mg at 07/03/24 1753    heparin (porcine) subcutaneous injection 5,000 Units, 5,000 Units, Subcutaneous, Q8H KEMAL, Niharika Russell PA-C, 5,000 Units at 07/04/24 0525    HYDROmorphone (DILAUDID) injection 1 mg, 1 mg, Intravenous, Q4H PRN, Angie Bernard MD    insulin lispro (HumALOG/ADMELOG) 100 units/mL subcutaneous injection 1-6 Units, 1-6 Units, Subcutaneous, TID AC **AND** Fingerstick Glucose (POCT), , , TID AC, Niharika Russell PA-C    insulin lispro (HumALOG/ADMELOG) 100 units/mL subcutaneous injection 1-6 Units, 1-6 Units, Subcutaneous, HS, Niharika Russell PA-C, 2 Units at 07/03/24 2211    NIFEdipine (PROCARDIA XL) 24 hr tablet 30 mg, 30 mg, Oral, After Dinner, Hamilton Mack MD, 30 mg at 07/03/24 1753    oxyCODONE (ROXICODONE) immediate release tablet 10 mg, 10 mg, Oral, Q6H, Angie Bernard MD    polyethylene glycol (MIRALAX) packet 17 g, 17 g, Oral, Daily PRN, Niharika Russell PA-C    sevelamer (RENAGEL) tablet 800 mg, 800 mg, Oral, TID With Meals, Niharika Russell PA-C, 800 mg at 07/04/24 0822    Laboratory Results:  Results from last 7 days   Lab Units 07/04/24  0451 07/03/24  0436 07/03/24  0137 07/02/24  1804   WBC Thousand/uL 9.94 7.68  --  7.35   HEMOGLOBIN g/dL 13.3 13.5 13.6  14.1   HEMATOCRIT % 41.2 41.2 41.7 43.2   PLATELETS Thousands/uL 201 205  --  205   POTASSIUM mmol/L 4.8 4.5  --  4.5   CHLORIDE mmol/L 96 90*  --  92*   CO2 mmol/L 21 26  --  20*   BUN mg/dL 39* 72*  --  69*   CREATININE mg/dL 8.00* 11.51*  --  11.37*   CALCIUM mg/dL 8.4 8.1*  --  8.2*   MAGNESIUM mg/dL  --  2.2  --  2.1   PHOSPHORUS mg/dL  --  7.6*  --   --

## 2024-07-04 NOTE — PROGRESS NOTES
"[facility]  Progress Note  Name: Naresh Carpio I  MRN: [id number]  Unit/Bed#: [address] [place] [address] Date of Admission: 7/2/2024   Date of Service: 7/4/2024 I Hospital Day: 2    Assessment & Plan   * Closed fracture of right pelvis (HCC)  Assessment & Plan  Patient presents after mechanical fall landing on buttocks with right buttock pain.     CT pelvis shows right pelvic and bilateral sacral fractures. Specifically, right inferior pubic ramus fracture, the posterior fracture fragment is displaced approximately one shaft width medially, Nondisplaced right anterior acetabular fracture, Nondisplaced fracture through superior right iliac wing extending into a minimally displaced fracture of the right iliac body. Minimally displaced bilateral S1 sacral fractures.   Scheduled/PRN analgesia   D/W Ortho recs:  non-operative intervention   TTWB RLE/WBAT LLE  Heparin DVT ppx due to ESRD  Q6H H+H x 3-WNL  PT/OT recs      Bilateral sacral fracture, closed (HCC)  Assessment & Plan  CT pelvis shows Minimally displaced bilateral S1 sacral fractures   2/2 mechanical fall   Management as stated above under \"closed fracture of right pelvis\"     Type 2 diabetes mellitus, without long-term current use of insulin ([facility])  Assessment & Plan  Lab Results   Component Value Date    HGBA1C 6.2 (H) 07/02/2024       Recent Labs     07/03/24  1604 07/03/24  2019 07/04/24  0719 07/04/24  1112   POCGLU 135 198* 128 117     SSI with accuchecks  Encourage diet control on discharge and close follow-up with PCP for consideration of initiation of metformin      ESRD (end stage renal disease) ([facility])  Assessment & Plan  Lab Results   Component Value Date    EGFR 6 07/04/2024    EGFR 4 07/03/2024    EGFR 4 07/02/2024    CREATININE 8.00 (H) 07/04/2024    CREATININE 11.51 (H) 07/03/2024    CREATININE 11.37 (H) 07/02/2024     HD TTS at [facility]; PATRICIA chacon   Missed HD on Saturday due to illness and today (Tuesday) due to fall "   no hyperkalemia, uremic sx, fluid overload   D/W nephro recs:  HD today > TTS schedule      Essential hypertension  Assessment & Plan  BP above goal possibly 2/2 pain   Continue Nifedipine nightly           VTE Pharmacologic Prophylaxis: VTE Score: 8 Moderate Risk (Score 3-4) - Pharmacological DVT Prophylaxis Ordered: heparin.    Mobility:   Basic Mobility Inpatient Raw Score: 7  JH-HLM Goal: 2: Bed activities/Dependent transfer  JH-HLM Achieved: 2: Bed activities/Dependent transfer  JH-HLM Goal achieved. Continue to encourage appropriate mobility.    Patient Centered Rounds: I performed bedside rounds with nursing staff today.   Discussions with Specialists or Other Care Team Provider: Ortho, nephro    Education and Discussions with Family / Patient:  Patient.     Total Time Spent on Date of Encounter in care of patient: 40 mins. This time was spent on one or more of the following: performing physical exam; counseling and coordination of care; obtaining or reviewing history; documenting in the medical record; reviewing/ordering tests, medications or procedures; communicating with other healthcare professionals and discussing with patient's family/caregivers.    Current Length of Stay: 2 day(s)  Current Patient Status: Inpatient   Certification Statement: The patient will continue to require additional inpatient hospital stay due to specialist recs PT OT eval  Discharge Plan: Anticipate discharge in 24-48 hrs to discharge location to be determined pending rehab evaluations.    Code Status: Level 1 - Full Code    Subjective:   Patient seen and examined at bedside, reported pain with movement right-sided with radiation into the groin.  Patient denied any chest pain palpitations shortness of breath or bleeding or abdominal pain fever or chills..  Ortho bedside discussed discharge recs    Objective:     Vitals:   Temp (24hrs), Av.4 °F (36.9 °C), Min:97.6 °F (36.4 °C), Max:100.2 °F (37.9 °C)    Temp:  [97.6 °F  (36.4 °C)-100.2 °F (37.9 °C)] 100.2 °F (37.9 °C)  HR:  [64-84] 84  Resp:  [16-20] 18  BP: (115-164)/(56-83) 150/71  SpO2:  [91 %-95 %] 92 %  Body mass index is 31.18 kg/m².     Input and Output Summary (last 24 hours):     Intake/Output Summary (Last 24 hours) at 7/4/2024 1426  Last data filed at 7/4/2024 1137  Gross per 24 hour   Intake 2040 ml   Output 3104 ml   Net -1064 ml       Physical Exam:   Physical Exam  Vitals and nursing note reviewed.   Constitutional:       General: He is not in acute distress.     Appearance: He is well-developed.   HENT:      Head: Normocephalic and atraumatic.   Eyes:      Conjunctiva/sclera: Conjunctivae normal.   Cardiovascular:      Rate and Rhythm: Normal rate and regular rhythm.      Heart sounds: No murmur heard.     Comments: Chest wall HD cath  Pulmonary:      Effort: Pulmonary effort is normal. No respiratory distress.      Breath sounds: Normal breath sounds.   Abdominal:      Palpations: Abdomen is soft.      Tenderness: There is no abdominal tenderness. There is no guarding.   Musculoskeletal:         General: No swelling.      Cervical back: Neck supple.      Comments: RLE externally rotated, right hallux amputation   Skin:     General: Skin is warm and dry.      Capillary Refill: Capillary refill takes less than 2 seconds.   Neurological:      Mental Status: He is alert.   Psychiatric:         Mood and Affect: Mood normal.          Additional Data:     Labs:  Results from last 7 days   Lab Units 07/04/24  0451   WBC Thousand/uL 9.94   HEMOGLOBIN g/dL 13.3   HEMATOCRIT % 41.2   PLATELETS Thousands/uL 201   SEGS PCT % 75   LYMPHO PCT % 11*   MONO PCT % 11   EOS PCT % 2     Results from last 7 days   Lab Units 07/04/24  0451 07/03/24  0436 07/02/24  1804   SODIUM mmol/L 130*   < > 130*   POTASSIUM mmol/L 4.8   < > 4.5   CHLORIDE mmol/L 96   < > 92*   CO2 mmol/L 21   < > 20*   BUN mg/dL 39*   < > 69*   CREATININE mg/dL 8.00*   < > 11.37*   ANION GAP mmol/L 13   < > 18*    CALCIUM mg/dL 8.4   < > 8.2*   ALBUMIN g/dL  --   --  4.0   TOTAL BILIRUBIN mg/dL  --   --  0.44   ALK PHOS U/L  --   --  64   ALT U/L  --   --  11   AST U/L  --   --  12*   GLUCOSE RANDOM mg/dL 123   < > 115    < > = values in this interval not displayed.         Results from last 7 days   Lab Units 07/04/24  1112 07/04/24  0719 07/03/24 2019 07/03/24  1604 07/03/24  1132 07/03/24  0717 07/02/24  2359   POC GLUCOSE mg/dl 117 128 198* 135 112 97 115     Results from last 7 days   Lab Units 07/02/24  1804   HEMOGLOBIN A1C % 6.2*           Lines/Drains:  Invasive Devices       Peripheral Intravenous Line  Duration             Peripheral IV 07/02/24 Dorsal (posterior);Left Forearm 1 day              Hemodialysis Catheter  Duration             HD Permanent Double Catheter -- days                  Imaging: Reviewed radiology reports from this admission including: abdominal/pelvic CT    Recent Cultures (last 7 days):         Last 24 Hours Medication List:   Current Facility-Administered Medications   Medication Dose Route Frequency Provider Last Rate    acetaminophen  650 mg Oral Q6H PRN Niharika Russell PA-C      allopurinol  100 mg Oral Daily Niharika Russell PA-C      atorvastatin  80 mg Oral Daily With Dinner Niharika Russell PA-C      docusate sodium  100 mg Oral BID Niharika Russell PA-C      gabapentin  200 mg Oral BID Niharika Russell PA-C      heparin (porcine)  5,000 Units Subcutaneous Q8H On license of [facility] Niharika Russell PA-C      HYDROmorphone  1 mg Intravenous Q4H PRN Angie Bernard MD      insulin lispro  1-6 Units Subcutaneous TID AC Niharika Russell PA-C      insulin lispro  1-6 Units Subcutaneous HS Niharika Russell PA-C      NIFEdipine  30 mg Oral After Dinner Hamilton Mack MD      oxyCODONE  10 mg Oral Q6H Angie Bernard MD      polyethylene glycol  17 g Oral Daily PRN Niharika Russell PA-C      sevelamer  800 mg Oral TID With Meals Niharika Russell PA-C           Today, Patient Was Seen By: Angie Bernard MD    **Please Note: This note may have been constructed using a voice recognition system.**

## 2024-07-05 LAB
ANION GAP SERPL CALCULATED.3IONS-SCNC: 10 MMOL/L (ref 4–13)
BASOPHILS # BLD AUTO: 0.04 THOUSANDS/ÂΜL (ref 0–0.1)
BASOPHILS NFR BLD AUTO: 0 % (ref 0–1)
BUN SERPL-MCNC: 28 MG/DL (ref 5–25)
CALCIUM SERPL-MCNC: 8.1 MG/DL (ref 8.4–10.2)
CHLORIDE SERPL-SCNC: 95 MMOL/L (ref 96–108)
CO2 SERPL-SCNC: 26 MMOL/L (ref 21–32)
CREAT SERPL-MCNC: 6.53 MG/DL (ref 0.6–1.3)
EOSINOPHIL # BLD AUTO: 0.3 THOUSAND/ÂΜL (ref 0–0.61)
EOSINOPHIL NFR BLD AUTO: 3 % (ref 0–6)
ERYTHROCYTE [DISTWIDTH] IN BLOOD BY AUTOMATED COUNT: 15.9 % (ref 11.6–15.1)
GFR SERPL CREATININE-BSD FRML MDRD: 8 ML/MIN/1.73SQ M
GLUCOSE SERPL-MCNC: 103 MG/DL (ref 65–140)
GLUCOSE SERPL-MCNC: 108 MG/DL (ref 65–140)
GLUCOSE SERPL-MCNC: 115 MG/DL (ref 65–140)
GLUCOSE SERPL-MCNC: 138 MG/DL (ref 65–140)
GLUCOSE SERPL-MCNC: 149 MG/DL (ref 65–140)
HCT VFR BLD AUTO: 37 % (ref 36.5–49.3)
HGB BLD-MCNC: 11.8 G/DL (ref 12–17)
IMM GRANULOCYTES # BLD AUTO: 0.04 THOUSAND/UL (ref 0–0.2)
IMM GRANULOCYTES NFR BLD AUTO: 0 % (ref 0–2)
LYMPHOCYTES # BLD AUTO: 1.28 THOUSANDS/ÂΜL (ref 0.6–4.47)
LYMPHOCYTES NFR BLD AUTO: 12 % (ref 14–44)
MCH RBC QN AUTO: 29.4 PG (ref 26.8–34.3)
MCHC RBC AUTO-ENTMCNC: 31.9 G/DL (ref 31.4–37.4)
MCV RBC AUTO: 92 FL (ref 82–98)
MONOCYTES # BLD AUTO: 1.31 THOUSAND/ÂΜL (ref 0.17–1.22)
MONOCYTES NFR BLD AUTO: 13 % (ref 4–12)
NEUTROPHILS # BLD AUTO: 7.45 THOUSANDS/ÂΜL (ref 1.85–7.62)
NEUTS SEG NFR BLD AUTO: 72 % (ref 43–75)
NRBC BLD AUTO-RTO: 0 /100 WBCS
PLATELET # BLD AUTO: 175 THOUSANDS/UL (ref 149–390)
PMV BLD AUTO: 10 FL (ref 8.9–12.7)
POTASSIUM SERPL-SCNC: 4.4 MMOL/L (ref 3.5–5.3)
RBC # BLD AUTO: 4.02 MILLION/UL (ref 3.88–5.62)
SODIUM SERPL-SCNC: 131 MMOL/L (ref 135–147)
WBC # BLD AUTO: 10.42 THOUSAND/UL (ref 4.31–10.16)

## 2024-07-05 PROCEDURE — 99232 SBSQ HOSP IP/OBS MODERATE 35: CPT | Performed by: STUDENT IN AN ORGANIZED HEALTH CARE EDUCATION/TRAINING PROGRAM

## 2024-07-05 PROCEDURE — 97535 SELF CARE MNGMENT TRAINING: CPT

## 2024-07-05 PROCEDURE — 97110 THERAPEUTIC EXERCISES: CPT

## 2024-07-05 PROCEDURE — 99232 SBSQ HOSP IP/OBS MODERATE 35: CPT | Performed by: INTERNAL MEDICINE

## 2024-07-05 PROCEDURE — 82948 REAGENT STRIP/BLOOD GLUCOSE: CPT

## 2024-07-05 PROCEDURE — 85025 COMPLETE CBC W/AUTO DIFF WBC: CPT | Performed by: STUDENT IN AN ORGANIZED HEALTH CARE EDUCATION/TRAINING PROGRAM

## 2024-07-05 PROCEDURE — 80048 BASIC METABOLIC PNL TOTAL CA: CPT | Performed by: STUDENT IN AN ORGANIZED HEALTH CARE EDUCATION/TRAINING PROGRAM

## 2024-07-05 RX ORDER — OXYCODONE HYDROCHLORIDE 5 MG/1
5 TABLET ORAL EVERY 6 HOURS PRN
Status: COMPLETED | OUTPATIENT
Start: 2024-07-05 | End: 2024-07-06

## 2024-07-05 RX ORDER — HYDROMORPHONE HCL/PF 1 MG/ML
0.5 SYRINGE (ML) INJECTION EVERY 6 HOURS PRN
Status: DISCONTINUED | OUTPATIENT
Start: 2024-07-05 | End: 2024-07-06 | Stop reason: HOSPADM

## 2024-07-05 RX ORDER — ACETAMINOPHEN 325 MG/1
975 TABLET ORAL EVERY 8 HOURS SCHEDULED
Status: DISCONTINUED | OUTPATIENT
Start: 2024-07-05 | End: 2024-07-06 | Stop reason: HOSPADM

## 2024-07-05 RX ORDER — HYDROMORPHONE HCL IN WATER/PF 6 MG/30 ML
0.2 PATIENT CONTROLLED ANALGESIA SYRINGE INTRAVENOUS EVERY 6 HOURS PRN
Status: DISCONTINUED | OUTPATIENT
Start: 2024-07-05 | End: 2024-07-05

## 2024-07-05 RX ORDER — HYDROMORPHONE HCL/PF 1 MG/ML
1 SYRINGE (ML) INJECTION EVERY 4 HOURS PRN
Status: DISCONTINUED | OUTPATIENT
Start: 2024-07-05 | End: 2024-07-06 | Stop reason: HOSPADM

## 2024-07-05 RX ADMIN — HYDROMORPHONE HYDROCHLORIDE 0.2 MG: 0.2 INJECTION, SOLUTION INTRAMUSCULAR; INTRAVENOUS; SUBCUTANEOUS at 05:37

## 2024-07-05 RX ADMIN — Medication 2.5 MG: at 03:08

## 2024-07-05 RX ADMIN — ATORVASTATIN CALCIUM 80 MG: 80 TABLET, FILM COATED ORAL at 16:24

## 2024-07-05 RX ADMIN — DOCUSATE SODIUM 100 MG: 100 CAPSULE, LIQUID FILLED ORAL at 09:13

## 2024-07-05 RX ADMIN — SEVELAMER HYDROCHLORIDE 800 MG: 800 TABLET, FILM COATED PARENTERAL at 16:24

## 2024-07-05 RX ADMIN — SEVELAMER HYDROCHLORIDE 800 MG: 800 TABLET, FILM COATED PARENTERAL at 08:13

## 2024-07-05 RX ADMIN — HYDROMORPHONE HYDROCHLORIDE 0.5 MG: 1 INJECTION, SOLUTION INTRAMUSCULAR; INTRAVENOUS; SUBCUTANEOUS at 11:50

## 2024-07-05 RX ADMIN — GABAPENTIN 200 MG: 100 CAPSULE ORAL at 09:13

## 2024-07-05 RX ADMIN — POLYETHYLENE GLYCOL 3350 17 G: 17 POWDER, FOR SOLUTION ORAL at 09:13

## 2024-07-05 RX ADMIN — ACETAMINOPHEN 975 MG: 325 TABLET ORAL at 14:35

## 2024-07-05 RX ADMIN — ACETAMINOPHEN 975 MG: 325 TABLET ORAL at 03:08

## 2024-07-05 RX ADMIN — NIFEDIPINE 30 MG: 30 TABLET, EXTENDED RELEASE ORAL at 17:56

## 2024-07-05 RX ADMIN — HEPARIN SODIUM 5000 UNITS: 5000 INJECTION, SOLUTION INTRAVENOUS; SUBCUTANEOUS at 05:37

## 2024-07-05 RX ADMIN — ALLOPURINOL 100 MG: 100 TABLET ORAL at 09:13

## 2024-07-05 RX ADMIN — HEPARIN SODIUM 5000 UNITS: 5000 INJECTION, SOLUTION INTRAVENOUS; SUBCUTANEOUS at 22:13

## 2024-07-05 RX ADMIN — SEVELAMER HYDROCHLORIDE 800 MG: 800 TABLET, FILM COATED PARENTERAL at 11:52

## 2024-07-05 RX ADMIN — OXYCODONE HYDROCHLORIDE 5 MG: 5 TABLET ORAL at 10:58

## 2024-07-05 RX ADMIN — ACETAMINOPHEN 975 MG: 325 TABLET ORAL at 22:09

## 2024-07-05 RX ADMIN — HEPARIN SODIUM 5000 UNITS: 5000 INJECTION, SOLUTION INTRAVENOUS; SUBCUTANEOUS at 14:36

## 2024-07-05 RX ADMIN — DOCUSATE SODIUM 100 MG: 100 CAPSULE, LIQUID FILLED ORAL at 17:56

## 2024-07-05 RX ADMIN — GABAPENTIN 200 MG: 100 CAPSULE ORAL at 17:56

## 2024-07-05 NOTE — PLAN OF CARE
Problem: OCCUPATIONAL THERAPY ADULT  Goal: Performs self-care activities at highest level of function for planned discharge setting.  See evaluation for individualized goals.  Description: Treatment Interventions: ADL retraining, Functional transfer training, Endurance training, Patient/family training, Equipment evaluation/education, Activityengagement, Compensatory technique education          See flowsheet documentation for full assessment, interventions and recommendations.   Outcome: Progressing  Note: Limitation: Decreased ADL status, Decreased UE strength, Decreased Safe judgement during ADL, Decreased endurance, Decreased self-care trans, Decreased high-level ADLs (decreased balance and mobility)  Prognosis: Good  Assessment: Pt seen for ADL training. Education and training with teachback method begun with pt regarding energy conservation/proper body mechanics during ADLS and functional mobility to decrease fall risks, decrease pain, and increase stamina needed to return to prior level of function. Pt is cooperative with therapeutic activities with max encouragement, but has extreme anticipatory fear of falling and pain which limits his active participation in all activities. Pt is very anxious and requires frequent rests and clear explanation of any activity prior to engaging. Despite severe pain, pt was able to demonstrate some improvement in strength and balance during ADLS and functional mobility. Pt demonstrating good verbal understanding of all information provided and all questions answered. Patient left OOB in chair with all needs within reach and tab alarm in place. Continue OT per POC.   The patient's raw score on the AM-PAC Daily Activity Inpatient Short Form is 14. A raw score of less than 19 suggests the patient may benefit from discharge to post-acute rehabilitation services. Please refer to the recommendation of the Occupational Therapist for safe discharge planning.     Rehab Resource  Intensity Level, OT: II (Moderate Resource Intensity)

## 2024-07-05 NOTE — PLAN OF CARE
Problem: PAIN - ADULT  Goal: Verbalizes/displays adequate comfort level or baseline comfort level  Description: Interventions:  - Encourage patient to monitor pain and request assistance  - Assess pain using appropriate pain scale  - Administer analgesics based on type and severity of pain and evaluate response  - Implement non-pharmacological measures as appropriate and evaluate response  - Consider cultural and social influences on pain and pain management  - Notify physician/advanced practitioner if interventions unsuccessful or patient reports new pain  Outcome: Progressing     Problem: INFECTION - ADULT  Goal: Absence or prevention of progression during hospitalization  Description: INTERVENTIONS:  - Assess and monitor for signs and symptoms of infection  - Monitor lab/diagnostic results  - Monitor all insertion sites, i.e. indwelling lines, tubes, and drains  - Monitor endotracheal if appropriate and nasal secretions for changes in amount and color  - Armstrong appropriate cooling/warming therapies per order  - Administer medications as ordered  - Instruct and encourage patient and family to use good hand hygiene technique  - Identify and instruct in appropriate isolation precautions for identified infection/condition  Outcome: Progressing  Goal: Absence of fever/infection during neutropenic period  Description: INTERVENTIONS:  - Monitor WBC    Outcome: Progressing     Problem: SAFETY ADULT  Goal: Patient will remain free of falls  Description: INTERVENTIONS:  - Educate patient/family on patient safety including physical limitations  - Instruct patient to call for assistance with activity   - Consult OT/PT to assist with strengthening/mobility   - Keep Call bell within reach  - Keep bed low and locked with side rails adjusted as appropriate  - Keep care items and personal belongings within reach  - Initiate and maintain comfort rounds  - Make Fall Risk Sign visible to staff  - Offer Toileting every 2 Hours,  in advance of need  - Initiate/Maintain bed alarm  - Obtain necessary fall risk management equipment: bracelet/socks   - Apply yellow socks and bracelet for high fall risk patients  - Consider moving patient to room near nurses station  Outcome: Progressing  Goal: Maintain or return to baseline ADL function  Description: INTERVENTIONS:  -  Assess patient's ability to carry out ADLs; assess patient's baseline for ADL function and identify physical deficits which impact ability to perform ADLs (bathing, care of mouth/teeth, toileting, grooming, dressing, etc.)  - Assess/evaluate cause of self-care deficits   - Assess range of motion  - Assess patient's mobility; develop plan if impaired  - Assess patient's need for assistive devices and provide as appropriate  - Encourage maximum independence but intervene and supervise when necessary  - Involve family in performance of ADLs  - Assess for home care needs following discharge   - Consider OT consult to assist with ADL evaluation and planning for discharge  - Provide patient education as appropriate  Outcome: Progressing  Goal: Maintains/Returns to pre admission functional level  Description: INTERVENTIONS:  - Perform AM-PAC 6 Click Basic Mobility/ Daily Activity assessment daily.  - Set and communicate daily mobility goal to care team and patient/family/caregiver.   - Collaborate with rehabilitation services on mobility goals if consulted  - Perform Range of Motion 12 times a day.  - Reposition patient every 2 hours.  - Dangle patient 3 times a day  - Stand patient 3 times a day  - Ambulate patient 3 times a day  - Out of bed to chair 3 times a day   - Out of bed for meals 3 times a day  - Out of bed for toileting  - Record patient progress and toleration of activity level   Outcome: Progressing     Problem: MUSCULOSKELETAL - ADULT  Goal: Maintain or return mobility to safest level of function  Description: INTERVENTIONS:  - Assess patient's ability to carry out ADLs;  assess patient's baseline for ADL function and identify physical deficits which impact ability to perform ADLs (bathing, care of mouth/teeth, toileting, grooming, dressing, etc.)  - Assess/evaluate cause of self-care deficits   - Assess range of motion  - Assess patient's mobility  - Assess patient's need for assistive devices and provide as appropriate  - Encourage maximum independence but intervene and supervise when necessary  - Involve family in performance of ADLs  - Assess for home care needs following discharge   - Consider OT consult to assist with ADL evaluation and planning for discharge  - Provide patient education as appropriate  Outcome: Progressing     Problem: Prexisting or High Potential for Compromised Skin Integrity  Goal: Skin integrity is maintained or improved  Description: INTERVENTIONS:  - Identify patients at risk for skin breakdown  - Assess and monitor skin integrity  - Assess and monitor nutrition and hydration status  - Monitor labs   - Assess for incontinence   - Turn and reposition patient  - Assist with mobility/ambulation  - Relieve pressure over bony prominences  - Avoid friction and shearing  - Provide appropriate hygiene as needed including keeping skin clean and dry  - Evaluate need for skin moisturizer/barrier cream  - Collaborate with interdisciplinary team   - Patient/family teaching  - Consider wound care consult   Outcome: Progressing     Problem: METABOLIC, FLUID AND ELECTROLYTES - ADULT  Goal: Electrolytes maintained within normal limits  Description: INTERVENTIONS:  - Monitor labs and assess patient for signs and symptoms of electrolyte imbalances  - Administer electrolyte replacement as ordered  - Monitor response to electrolyte replacements, including repeat lab results as appropriate  - Instruct patient on fluid and nutrition as appropriate  Outcome: Progressing  Goal: Fluid balance maintained  Description: INTERVENTIONS:  - Monitor labs   - Monitor I/O and WT  -  Instruct patient on fluid and nutrition as appropriate  - Assess for signs & symptoms of volume excess or deficit  Outcome: Progressing     Problem: Nutrition/Hydration-ADULT  Goal: Nutrient/Hydration intake appropriate for improving, restoring or maintaining nutritional needs  Description: Monitor and assess patient's nutrition/hydration status for malnutrition. Collaborate with interdisciplinary team and initiate plan and interventions as ordered.  Monitor patient's weight and dietary intake as ordered or per policy. Utilize nutrition screening tool and intervene as necessary. Determine patient's food preferences and provide high-protein, high-caloric foods as appropriate.     INTERVENTIONS:  - Monitor oral intake, urinary output, labs, and treatment plans  - Assess nutrition and hydration status and recommend course of action  - Evaluate amount of meals eaten  - Assist patient with eating if necessary   - Allow adequate time for meals  - Recommend/ encourage appropriate diets, oral nutritional supplements, and vitamin/mineral supplements  - Order, calculate, and assess calorie counts as needed  - Recommend, monitor, and adjust tube feedings and TPN/PPN based on assessed needs  - Assess need for intravenous fluids  - Provide specific nutrition/hydration education as appropriate  - Include patient/family/caregiver in decisions related to nutrition  Outcome: Progressing

## 2024-07-05 NOTE — PHYSICAL THERAPY NOTE
PT TREATMENT     07/05/24 1149   PT Last Visit   PT Visit Date 07/05/24   Note Type   Note Type Treatment   Pain Assessment   Pain Assessment Tool 0-10   Pain Score 9  (R hip, pelvis and low back areas(nurse aware and medicating))   Restrictions/Precautions   RLE Weight Bearing Per Order (S)  TTWB   LLE Weight Bearing Per Order WBAT   Other Precautions Chair Alarm;Bed Alarm;Fall Risk;Pain   General   Chart Reviewed Yes   Family/Caregiver Present No   Cognition   Arousal/Participation Cooperative   Subjective   Subjective patient states having increased pain and anxious with movement and activity   Bed Mobility   Supine to Sit 2  Maximal assistance   Additional items Verbal cues;LE management;HOB elevated;Bedrails   Transfers   Sit to Stand 3  Moderate assistance   Additional items Assist x 2;Verbal cues   Stand to Sit 3  Moderate assistance   Additional items Assist x 2;Verbal cues   Ambulation/Elevation   Gait Assistance 3  Moderate assist   Additional items Assist x 2;Verbal cues;Tactile cues   Assistive Device Rolling walker   Distance 1 shufflingtype step with assist for weight shifting to maintain TTWB RLE.   Balance   Static Sitting Fair +   Dynamic Sitting Fair -   Static Standing Fair -   Dynamic Standing Poor +   Ambulatory Poor +   Activity Tolerance   Activity Tolerance Patient limited by fatigue;Patient limited by pain  (Limited by anxiousness)   Exercises   Knee AROM Long Arc Quad Sitting;5 reps;Bilateral   Ankle Pumps Sitting;10 reps;Bilateral   Balance training  Standing balance activity completed with weight shifting to improve maintenance of toe-touch weightbearing on right lower extremity.  Sit to and from stand completed x 2 for strengthening and balance training   Assessment   Assessment Patient cooperative able to demonstrate standing and very limited gait ability this session patient will benefit from continued physical therapy with progression as tolerated and increasing functional mobility  with clinical staff as well. The patient's -[facility] Basic Mobility Inpatient Short Form Raw Score is 9. A Raw score of less than or equal to 16 suggests the patient may benefit from discharge to post-acute rehabilitation services. Please also refer to the recommendation of the Physical Therapist for safe discharge planning.         Plan   Treatment/Interventions ADL retraining;Functional transfer training;LE strengthening/ROM;Therapeutic exercise;Endurance training;Patient/family training;Equipment eval/education;Bed mobility;Gait training;Compensatory technique education   PT Frequency Other (Comment)  (Daily)   Discharge Recommendation   Rehab Resource Intensity Level, PT II (Moderate Resource Intensity)   AM-PAC Basic Mobility Inpatient   Turning in Flat Bed Without Bedrails 2   Lying on Back to Sitting on Edge of Flat Bed Without Bedrails 2   Moving Bed to Chair 1   Standing Up From Chair Using Arms 2   Walk in Room 1   Climb 3-5 Stairs With Railing 1   Basic Mobility Inpatient Raw Score 9   Turning Head Towards Sound 4   Follow Simple Instructions 3   Low Function Basic Mobility Raw Score  16   Low Function Basic Mobility Standardized Score  25.72   [facility] Highest Level Of Mobility   -HLM Goal 3: Sit at edge of bed   -HLM Achieved 5: Stand (1 or more minutes)   Education   Patient Reinforcement needed   Licensure   [place] License Number  Anat Lukasz PT 4 0 [id number]

## 2024-07-05 NOTE — PROGRESS NOTES
"[facility]  Progress Note  Name: Naresh Carpio I  MRN: [id number]  Unit/Bed#: [address] [place] [address] Date of Admission: 7/2/2024   Date of Service: 7/5/2024 I Hospital Day: 3    Assessment & Plan   * Closed fracture of right pelvis (HCC)  Assessment & Plan  Patient presents after mechanical fall landing on buttocks with right buttock pain.     CT pelvis shows right pelvic and bilateral sacral fractures. Specifically, right inferior pubic ramus fracture, the posterior fracture fragment is displaced approximately one shaft width medially, Nondisplaced right anterior acetabular fracture, Nondisplaced fracture through superior right iliac wing extending into a minimally displaced fracture of the right iliac body. Minimally displaced bilateral S1 sacral fractures.  OOB, encourage ambulation per Ortho recs  Scheduled/PRN analgesia unresolved  D/W Ortho recs:  non-operative intervention   TTWB RLE/WBAT LLE  Heparin DVT ppx due to ESRD  Q6H H+H x 3-WNL  PT/OT recs      Bilateral sacral fracture, closed (HCC)  Assessment & Plan  CT pelvis shows Minimally displaced bilateral S1 sacral fractures   2/2 mechanical fall   Management as stated above under \"closed fracture of right pelvis\"     Type 2 diabetes mellitus, without long-term current use of insulin ([facility])  Assessment & Plan  Lab Results   Component Value Date    HGBA1C 6.2 (H) 07/02/2024       Recent Labs     07/04/24  1112 07/04/24  1608 07/04/24  2044 07/05/24  0751   POCGLU 117 149* 150* 103     SSI with accuchecks  Encourage diet control on discharge and close follow-up with PCP for consideration of initiation of metformin      ESRD (end stage renal disease) (HCC)  Assessment & Plan  Lab Results   Component Value Date    EGFR 8 07/05/2024    EGFR 6 07/04/2024    EGFR 4 07/03/2024    CREATININE 6.53 (H) 07/05/2024    CREATININE 8.00 (H) 07/04/2024    CREATININE 11.51 (H) 07/03/2024     HD TTS at [facility]; PATRICIA chacon   Missed HD on " Saturday due to illness and today (Tuesday) due to fall   no hyperkalemia, uremic sx, fluid overload   D/W nephro recs:  HD TTS schedule      Essential hypertension  Assessment & Plan  BP above goal possibly 2/2 pain   Continue Nifedipine nightly           VTE Pharmacologic Prophylaxis: VTE Score: 8 Moderate Risk (Score 3-4) - Pharmacological DVT Prophylaxis Ordered: heparin.    Mobility:   Basic Mobility Inpatient Raw Score: 9  JH-HLM Goal: 3: Sit at edge of bed  JH-HLM Achieved: 3: Sit at edge of bed  JH-HLM Goal achieved. Continue to encourage appropriate mobility.    Patient Centered Rounds: I performed bedside rounds with nursing staff today.   Discussions with Specialists or Other Care Team Provider: Ortho, nephro    Education and Discussions with Family / Patient:  Patient.     Total Time Spent on Date of Encounter in care of patient: 40 mins. This time was spent on one or more of the following: performing physical exam; counseling and coordination of care; obtaining or reviewing history; documenting in the medical record; reviewing/ordering tests, medications or procedures; communicating with other healthcare professionals and discussing with patient's family/caregivers.    Current Length of Stay: 3 day(s)  Current Patient Status: Inpatient   Certification Statement: The patient will continue to require additional inpatient hospital stay due to specialist recs PT OT eval  Discharge Plan: Anticipate discharge in 24-48 hrs to discharge location to be determined pending rehab evaluations.    Code Status: Level 1 - Full Code    Subjective:   Patient seen and examined at bedside, was resting, denies any chest pain or shortness of breath.  Reported uncontrolled pain from pelvic fracture throughout the night, nurse reported patient needing more breakthrough pain.    Objective:     Vitals:   Temp (24hrs), Av.8 °F (37.1 °C), Min:97.8 °F (36.6 °C), Max:100.2 °F (37.9 °C)    Temp:  [97.8 °F (36.6 °C)-100.2 °F (37.9  °C)] 99.1 °F (37.3 °C)  HR:  [72-84] 72  Resp:  [16-18] 16  BP: (120-151)/(54-71) 141/56  SpO2:  [91 %-95 %] 91 %  Body mass index is 31.35 kg/m².     Input and Output Summary (last 24 hours):     Intake/Output Summary (Last 24 hours) at 7/5/2024 1050  Last data filed at 7/4/2024 1137  Gross per 24 hour   Intake 300 ml   Output 1003 ml   Net -703 ml       Physical Exam  Vitals and nursing note reviewed.   Constitutional:       General: He is not in acute distress.     Appearance: He is well-developed.   HENT:      Head: Normocephalic and atraumatic.   Eyes:      Conjunctiva/sclera: Conjunctivae normal.   Cardiovascular:      Rate and Rhythm: Normal rate and regular rhythm.      Heart sounds: No murmur heard.  Pulmonary:      Effort: Pulmonary effort is normal. No respiratory distress.      Breath sounds: Normal breath sounds. No wheezing or rhonchi.      Comments: Chest wall IJ cath  Abdominal:      Palpations: Abdomen is soft.      Tenderness: There is no abdominal tenderness.   Musculoskeletal:         General: No swelling.      Cervical back: Neck supple.      Right lower leg: No edema.      Left lower leg: No edema.      Comments: Right hallux amputation, lower extremity hyperpigmentation   Skin:     General: Skin is warm and dry.      Capillary Refill: Capillary refill takes less than 2 seconds.   Neurological:      Mental Status: He is alert.   Psychiatric:         Mood and Affect: Mood normal.          Additional Data:     Labs:  Results from last 7 days   Lab Units 07/05/24  0548   WBC Thousand/uL 10.42*   HEMOGLOBIN g/dL 11.8*   HEMATOCRIT % 37.0   PLATELETS Thousands/uL 175   SEGS PCT % 72   LYMPHO PCT % 12*   MONO PCT % 13*   EOS PCT % 3     Results from last 7 days   Lab Units 07/05/24  0548 07/03/24  0436 07/02/24  1804   SODIUM mmol/L 131*   < > 130*   POTASSIUM mmol/L 4.4   < > 4.5   CHLORIDE mmol/L 95*   < > 92*   CO2 mmol/L 26   < > 20*   BUN mg/dL 28*   < > 69*   CREATININE mg/dL 6.53*   < > 11.37*    ANION GAP mmol/L 10   < > 18*   CALCIUM mg/dL 8.1*   < > 8.2*   ALBUMIN g/dL  --   --  4.0   TOTAL BILIRUBIN mg/dL  --   --  0.44   ALK PHOS U/L  --   --  64   ALT U/L  --   --  11   AST U/L  --   --  12*   GLUCOSE RANDOM mg/dL 108   < > 115    < > = values in this interval not displayed.         Results from last 7 days   Lab Units 07/05/24  0751 07/04/24 2044 07/04/24  1608 07/04/24  1112 07/04/24  0719 07/03/24 2019 07/03/24  1604 07/03/24  1132 07/03/24  0717 07/02/24  2359   POC GLUCOSE mg/dl 103 150* 149* 117 128 198* 135 112 97 115     Results from last 7 days   Lab Units 07/02/24  1804   HEMOGLOBIN A1C % 6.2*           Lines/Drains:  Invasive Devices       Peripheral Intravenous Line  Duration             Peripheral IV 07/02/24 Dorsal (posterior);Left Forearm 2 days              Hemodialysis Catheter  Duration             HD Permanent Double Catheter -- days                  Imaging: Reviewed radiology reports from this admission including: abdominal/pelvic CT    Recent Cultures (last 7 days):         Last 24 Hours Medication List:   Current Facility-Administered Medications   Medication Dose Route Frequency Provider Last Rate    acetaminophen  650 mg Oral Q6H PRN Niharika Russell PA-C      acetaminophen  975 mg Oral Q8H [facility] GRANT Rich      allopurinol  100 mg Oral Daily Niharika Russell PA-C      atorvastatin  80 mg Oral Daily With Dinner Niharika Russell PA-C      docusate sodium  100 mg Oral BID Niharika Russell PA-C      gabapentin  200 mg Oral BID Niharika Russell PA-C      heparin (porcine)  5,000 Units Subcutaneous Q8H KEMAL Niharika Russell PA-C      HYDROmorphone  0.5 mg Intravenous Q6H PRN Angie Bernard MD      HYDROmorphone  1 mg Intravenous Q4H PRN Angie Bernard MD      insulin lispro  1-6 Units Subcutaneous TID AC Niharika Russell PA-C      insulin lispro  1-6 Units Subcutaneous HS Niharika Russell PA-C      NIFEdipine  30 mg Oral After Dinner Hamilton  Sergey Mack MD      oxyCODONE  5 mg Oral Q6H PRN Angie Bernard MD      polyethylene glycol  17 g Oral Daily PRN Niharika Russell PA-C      sevelamer  800 mg Oral TID With Meals Niharika Russell PA-C          Today, Patient Was Seen By: Angie Bernard MD    **Please Note: This note may have been constructed using a voice recognition system.**

## 2024-07-05 NOTE — OCCUPATIONAL THERAPY NOTE
"  OT TREATMENT       07/05/24 1220   OT Last Visit   OT Visit Date 07/05/24   Note Type   Note Type Treatment   Pain Assessment   Pain Assessment Tool 0-10   Pain Score 8   Pain Location/Orientation Orientation: Left;Location: Hip   Hospital Pain Intervention(s) Repositioned;Ambulation/increased activity;Elevated;Emotional support  (CURLY Alfonso aware and provided meds as needed)   Restrictions/Precautions   RLE Weight Bearing Per Order (S)  TTWB   LLE Weight Bearing Per Order WBAT   Other Precautions Chair Alarm;Bed Alarm;Fall Risk;Pain   ADL   LB Dressing Assistance 1  Total Assistance   Toileting Assistance  2  Maximal Assistance   Toileting Deficit Use of bedpan/urinal setup   Bed Mobility   Rolling R 2  Maximal assistance   Additional items Assist x 1;HOB elevated;Bedrails;Verbal cues;Increased time required   Supine to Sit 2  Maximal assistance   Additional items Assist x 2;Bedrails;Increased time required;Verbal cues;LE management   Transfers   Sit to Stand 3  Moderate assistance   Additional items Assist x 2;Verbal cues;Increased time required   Stand to Sit 3  Moderate assistance   Additional items Assist x 2;Verbal cues   Therapeutic Excerise-Strength   UE Strength Yes   Right Upper Extremity- Strength   R Shoulder Flexion;Extension;ABduction;External rotation;Internal rotation   R Elbow Elbow flexion;Elbow extension   R Wrist Wrist flexion;Wrist extension   R Hand Thumb;Index finger;Long finger;Ring finger;Little finger   R Position Supine   Equipment Dowel   R Weight/Reps/Sets 10 reps each   Left Upper Extremity-Strength   L Shoulder Flexion;Extension;ABduction;External rotation;Internal rotation   L Elbow Elbow flexion;Elbow extension   L Wrist Wrist flexion;Wrist extension   L Hand Thumb;Index finger;Long finger;Ring finger;Little finger   L Position Supine   Equipment Dowel   L Weights/Reps/Sets 10 reps each   Subjective   Subjective \"You don't understand the pain\"   Cognition   Overall Cognitive Status " WFL   Arousal/Participation Alert   Attention Attends with cues to redirect   Orientation Level Oriented X4   Memory Within functional limits   Following Commands Follows multistep commands with increased time or repetition   Comments cooperative with max encouragement; extreme anticipatory fear of falling and pain which limits participation in all ADLs and functional mobility   Activity Tolerance   Activity Tolerance Patient limited by fatigue;Patient limited by pain   Medical Staff Made Aware CURLY Alfonso   Assessment   Assessment Pt seen for ADL training. Education and training with teachback method begun with pt regarding energy conservation/proper body mechanics during ADLS and functional mobility to decrease fall risks, decrease pain, and increase stamina needed to return to prior level of function. Pt is cooperative with therapeutic activities with max encouragement, but has extreme anticipatory fear of falling and pain which limits his active participation in all activities. Pt is very anxious and requires frequent rests and clear explanation of any activity prior to engaging. Despite severe pain, pt was able to demonstrate some improvement in strength and balance during ADLS and functional mobility. Pt demonstrating good verbal understanding of all information provided and all questions answered. Patient left OOB in chair with all needs within reach and tab alarm in place. Continue OT per POC.   The patient's raw score on the AM-PAC Daily Activity Inpatient Short Form is 14. A raw score of less than 19 suggests the patient may benefit from discharge to post-acute rehabilitation services. Please refer to the recommendation of the Occupational Therapist for safe discharge planning.   Plan   Treatment Interventions ADL retraining;Functional transfer training;Endurance training;Patient/family training;Equipment evaluation/education;Activityengagement;Compensatory technique education   OT Frequency 3-5x/wk    Discharge Recommendation   Rehab Resource Intensity Level, OT II (Moderate Resource Intensity)   AM-PAC Daily Activity Inpatient   Lower Body Dressing 1   Bathing 2   Toileting 2   Upper Body Dressing 2   Grooming 3   Eating 4   Daily Activity Raw Score 14   Daily Activity Standardized Score (Calc for Raw Score >=11) 33.39   AM-PAC Applied Cognition Inpatient   Following a Speech/Presentation 4   Understanding Ordinary Conversation 4   Taking Medications 4   Remembering Where Things Are Placed or Put Away 4   Remembering List of 4-5 Errands 4   Taking Care of Complicated Tasks 4   Applied Cognition Raw Score 24   Applied Cognition Standardized Score 62.21   End of Consult   Education Provided Yes   Patient Position at End of Consult Bedside chair;Bed/Chair alarm activated;All needs within reach   Nurse Communication Nurse aware of consult   Licensure   [place] License Number  Farheen Conklin MS, OTR/L, [place] Lic# [id number]

## 2024-07-05 NOTE — PROGRESS NOTES
NEPHROLOGY HOSPITAL PROGRESS NOTE   Naresh Carpio 63 y.o. male MRN: [id number]  Unit/Bed#: [address] [place] [address] Encounter: [id number]  Reason for Consult: ESRD on HD    ASSESSMENT and PLAN:  ESRD on HD ([facility]):  Stable electrolytes.  Plan for regular HD tomorrow.       Access: Right IJ PermCath.     Hypertension:  BP stable.    kg.  He was down to 105.7 kg yesterday.   Home Rx: None.   Current Rx: Nifedipine 30 mg OD  No changes.      Anemia:  Hemoglobin at goal.  Lower at 11.8.      Mineral and bone disease:  OP Rx: Sevelamer and Velphoro   Phos is above goal. Recheck phos.   Continue sevelamer 800 mg TID in the hospital.  Restart Velphoro on discharge.   Continue low phos diet.      Hyponatremia:  Na 131 today.   Continue fluid restriction to 1.5 L per 24 hours.    Right pelvic and bilateral sacral fractures  Fall    PLAN SUMMARY:  Next HD is tomorrow.  Check phos.     SUBJECTIVE / 24H INTERVAL HISTORY:  Still with pain for area of fracture.   No CP or SOB.   Tolerated HD yesterday    OBJECTIVE:  Current Weight: Weight - Scale: 108 kg (237 lb 9.6 oz)  Vitals:    07/04/24 1512 07/04/24 1940 07/04/24 2211 07/05/24 0555   BP: 120/54 140/57 141/56    BP Location: Left arm  Left arm    Pulse: 81 73 72    Resp:  18 16    Temp: 98 °F (36.7 °C) 97.8 °F (36.6 °C) 99.1 °F (37.3 °C)    TempSrc: Oral  Oral    SpO2: 95% 92% 91%    Weight:    108 kg (237 lb 9.6 oz)   Height:           Intake/Output Summary (Last 24 hours) at 7/5/2024 1007  Last data filed at 7/4/2024 1137  Gross per 24 hour   Intake 300 ml   Output 1003 ml   Net -703 ml     General: conscious, cooperative, no distress  Skin: dry  Eyes: pink conjunctivae  ENT: moist mucous membranes  Respiratory: equal chest expansion, clear breath sounds.  Cardiovascular: distinct heart sounds, normal rate, regular rhythm, no rub  Abdomen: soft, non tender, non distended, normal bowel sounds  Extremities: no edema.   Genitourinary: no gaitan catheter.   Neuro:  awake, alert.   Psych: appropriate affect    Medications:    Current Facility-Administered Medications:     acetaminophen (TYLENOL) tablet 650 mg, 650 mg, Oral, Q6H PRN, Niharika Russell PA-C, 650 mg at 07/04/24 1154    acetaminophen (TYLENOL) tablet 975 mg, 975 mg, Oral, Q8H KEMAL, GRANT Rich, 975 mg at 07/05/24 0308    allopurinol (ZYLOPRIM) tablet 100 mg, 100 mg, Oral, Daily, Niharika Russell PA-C, 100 mg at 07/05/24 0913    atorvastatin (LIPITOR) tablet 80 mg, 80 mg, Oral, Daily With Dinner, Niharika Russell PA-C, 80 mg at 07/04/24 1704    docusate sodium (COLACE) capsule 100 mg, 100 mg, Oral, BID, Niharika Russell PA-C, 100 mg at 07/05/24 0913    gabapentin (NEURONTIN) capsule 200 mg, 200 mg, Oral, BID, Niharika Russell PA-C, 200 mg at 07/05/24 0913    heparin (porcine) subcutaneous injection 5,000 Units, 5,000 Units, Subcutaneous, Q8H KEMAL, Niharika Russell PA-C, 5,000 Units at 07/05/24 0537    HYDROmorphone HCl (DILAUDID) injection 0.2 mg, 0.2 mg, Intravenous, Q6H PRN, GRANT Rich, 0.2 mg at 07/05/24 0537    insulin lispro (HumALOG/ADMELOG) 100 units/mL subcutaneous injection 1-6 Units, 1-6 Units, Subcutaneous, TID AC **AND** Fingerstick Glucose (POCT), , , TID AC, Niharika Russell PA-C    insulin lispro (HumALOG/ADMELOG) 100 units/mL subcutaneous injection 1-6 Units, 1-6 Units, Subcutaneous, HS, Niharika Russell PA-C, 1 Units at 07/04/24 2127    NIFEdipine (PROCARDIA XL) 24 hr tablet 30 mg, 30 mg, Oral, After Dinner, Hamilton Mack MD, 30 mg at 07/04/24 1704    oxyCODONE (ROXICODONE) split tablet 2.5 mg, 2.5 mg, Oral, Q8H PRN, GRANT Rich, 2.5 mg at 07/05/24 0308    polyethylene glycol (MIRALAX) packet 17 g, 17 g, Oral, Daily PRN, Niharika Russell PA-C, 17 g at 07/05/24 0913    sevelamer (RENAGEL) tablet 800 mg, 800 mg, Oral, TID With Meals, Niharika Russell PA-C, 800 mg at 07/05/24 0813    Laboratory Results:  Results from last 7 days   Lab  Units 07/05/24  0548 07/04/24  0451 07/03/24  0436 07/03/24  0137 07/02/24  1804   WBC Thousand/uL 10.42* 9.94 7.68  --  7.35   HEMOGLOBIN g/dL 11.8* 13.3 13.5 13.6 14.1   HEMATOCRIT % 37.0 41.2 41.2 41.7 43.2   PLATELETS Thousands/uL 175 201 205  --  205   POTASSIUM mmol/L 4.4 4.8 4.5  --  4.5   CHLORIDE mmol/L 95* 96 90*  --  92*   CO2 mmol/L 26 21 26  --  20*   BUN mg/dL 28* 39* 72*  --  69*   CREATININE mg/dL 6.53* 8.00* 11.51*  --  11.37*   CALCIUM mg/dL 8.1* 8.4 8.1*  --  8.2*   MAGNESIUM mg/dL  --   --  2.2  --  2.1   PHOSPHORUS mg/dL  --   --  7.6*  --   --

## 2024-07-05 NOTE — CASE MANAGEMENT
Case Management Discharge Planning Note    Patient name Naresh Carpio  Location [address] [place] [address]/[address] [place] [address]-* MRN [id number]  : 1960 Date 2024       Current Admission Date: 2024  Current Admission Diagnosis:Closed fracture of right pelvis ([facility])   Patient Active Problem List    Diagnosis Date Noted Date Diagnosed    Closed fracture of right pelvis ([facility]) 2024     Bilateral sacral fracture, closed ([facility]) 2024     Difficulty with speech 2024     History of amputation of hallux ([facility]) 2024     Essential hypertension 2024     Facial cellulitis 2024     Constipation 2023     Bradycardia 2023     Cellulitis of right foot      Polymicrobial bacterial infection 2023     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 mellitus, with necrosis of bone ([facility])      Acquired deformity of foot, right      Charcot's joint      Subacute osteomyelitis of right foot ([facility])      Open wound of right foot 2023     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 mellitus, with bone involvement without evidence of necrosis ([facility])      Diabetic ulcer of left midfoot associated with type 2 diabetes mellitus, limited to breakdown of skin ([facility])      Diabetic polyneuropathy associated with type 2 diabetes mellitus ([facility])      Diabetes mellitus type 2 with peripheral artery disease ([facility])      History of amputation of lesser toe of left foot ([facility])      Onychomycosis      Status post fall 2023     Traumatic rhabdomyolysis ([facility]) 2023     Leukocytosis 2023     Osteoarthritis of left hip 2022     Severe Left Orchalgia 2022     Right shoulder pain 2022     Left hip pain 2022     Hemodialysis status ([facility])      Hypervolemia      Anemia 2022     ESRD (end stage renal disease) ([facility]) 2022     Type 2 diabetes mellitus, without long-term current use of insulin ([facility])      Hypertension      History of TIA  (transient ischemic attack)      T10 vertebral fracture (HCC)        LOS (days): 3  Geometric Mean LOS (GMLOS) (days): 4.6  Days to GMLOS:1.8     OBJECTIVE:  Risk of Unplanned Readmission Score: 34.44       Current admission status: Inpatient   Preferred Pharmacy:   [id number] - [place], [place] - [address]  [address]  [facility] [address]  Phone: [phone] Fax: [phone]    Primary Care Provider: No primary care provider on file.    Primary Insurance: MEDICARE  Secondary Insurance:     DISCHARGE DETAILS:    Discharge planning discussed with:: Patient  Freedom of Choice: Yes    Comments - Freedom of Choice: Per patient's preference, plan is for discharge to [facility] at [facility] at [facility].  CURTIS spoke with Alessandra in admissions [phone] to notify her that patient is not cleared for discharge today but discharge is anticipated tomorrow after HD.  The facility has arranged for transportation to and from outpatient HD at [facility].      [facility] requested stretcher van transport in [employer] and pickup is scheduled for 7/6 at 1600.  CURTIS notified Alessandra at [facility] of pickup time and she requested that any changes to the discharge plan be called to her cell phone tomorrow at [phone].  CURTIS faxed Level 1 PASRR to Alessandra at [phone].  Attending notified of plan.      CM contacted family/caregiver?: No- see comments (Patient declined call to ex-wife Mercedes, stated that he has spoken with her and his daughters every day and they are aware of discharge plan)  Were Treatment Team discharge recommendations reviewed with patient/caregiver?: Yes  Did patient/caregiver verbalize understanding of patient care needs?: N/A- going to facility  Were patient/caregiver advised of the risks associated with not following Treatment Team discharge recommendations?: Yes    Contacts  Patient Contacts: Mercedes Resendez (ex-wife)  Relationship to Patient:: Friend  Contact Method: Phone  Phone Number:  [phone]    Requested Home Health Care         Is the patient interested in HHC at discharge?: No    DME Referral Provided  Referral made for DME?: No    Other Referral/Resources/Interventions Provided:  Interventions: Short Term Rehab, Transportation    Would you like to participate in our [facility] service program?  : No - Declined    Treatment Team Recommendation: Short Term Rehab  Discharge Destination Plan:: Short Term Rehab  Transport at Discharge : Stretcher van  Dispatcher Contacted: Yes  Number/Name of Dispatcher: [facility] via [employer]  Transported by (Company and Unit #): [employer]  ETA of Transport (Date): 07/06/24  ETA of Transport (Time): 1600         IMM Given (Date):: 07/05/24  IMM Given to:: Patient (IMM reviewed with and signed by patient.  Copy given to patient and copy placed in scan bin for chart.)

## 2024-07-06 ENCOUNTER — APPOINTMENT (INPATIENT)
Dept: DIALYSIS | Facility: HOSPITAL | Age: 64
DRG: 551 | End: 2024-07-06
Payer: MEDICARE

## 2024-07-06 VITALS
BODY MASS INDEX: 31.45 KG/M2 | DIASTOLIC BLOOD PRESSURE: 57 MMHG | SYSTOLIC BLOOD PRESSURE: 147 MMHG | OXYGEN SATURATION: 92 % | HEIGHT: 73 IN | RESPIRATION RATE: 16 BRPM | HEART RATE: 60 BPM | WEIGHT: 237.3 LBS | TEMPERATURE: 98 F

## 2024-07-06 LAB
ANION GAP SERPL CALCULATED.3IONS-SCNC: 13 MMOL/L (ref 4–13)
BASOPHILS # BLD AUTO: 0.04 THOUSANDS/ÂΜL (ref 0–0.1)
BASOPHILS NFR BLD AUTO: 0 % (ref 0–1)
BUN SERPL-MCNC: 44 MG/DL (ref 5–25)
CALCIUM SERPL-MCNC: 8.5 MG/DL (ref 8.4–10.2)
CHLORIDE SERPL-SCNC: 93 MMOL/L (ref 96–108)
CO2 SERPL-SCNC: 26 MMOL/L (ref 21–32)
CREAT SERPL-MCNC: 9 MG/DL (ref 0.6–1.3)
EOSINOPHIL # BLD AUTO: 0.46 THOUSAND/ÂΜL (ref 0–0.61)
EOSINOPHIL NFR BLD AUTO: 5 % (ref 0–6)
ERYTHROCYTE [DISTWIDTH] IN BLOOD BY AUTOMATED COUNT: 15.9 % (ref 11.6–15.1)
GFR SERPL CREATININE-BSD FRML MDRD: 5 ML/MIN/1.73SQ M
GLUCOSE SERPL-MCNC: 107 MG/DL (ref 65–140)
GLUCOSE SERPL-MCNC: 117 MG/DL (ref 65–140)
GLUCOSE SERPL-MCNC: 123 MG/DL (ref 65–140)
HCT VFR BLD AUTO: 36.6 % (ref 36.5–49.3)
HGB BLD-MCNC: 11.5 G/DL (ref 12–17)
IMM GRANULOCYTES # BLD AUTO: 0.06 THOUSAND/UL (ref 0–0.2)
IMM GRANULOCYTES NFR BLD AUTO: 1 % (ref 0–2)
LYMPHOCYTES # BLD AUTO: 1.16 THOUSANDS/ÂΜL (ref 0.6–4.47)
LYMPHOCYTES NFR BLD AUTO: 12 % (ref 14–44)
MCH RBC QN AUTO: 29.2 PG (ref 26.8–34.3)
MCHC RBC AUTO-ENTMCNC: 31.4 G/DL (ref 31.4–37.4)
MCV RBC AUTO: 93 FL (ref 82–98)
MONOCYTES # BLD AUTO: 1.12 THOUSAND/ÂΜL (ref 0.17–1.22)
MONOCYTES NFR BLD AUTO: 12 % (ref 4–12)
NEUTROPHILS # BLD AUTO: 6.91 THOUSANDS/ÂΜL (ref 1.85–7.62)
NEUTS SEG NFR BLD AUTO: 70 % (ref 43–75)
NRBC BLD AUTO-RTO: 0 /100 WBCS
PHOSPHATE SERPL-MCNC: 6.7 MG/DL (ref 2.3–4.1)
PLATELET # BLD AUTO: 190 THOUSANDS/UL (ref 149–390)
PMV BLD AUTO: 10.3 FL (ref 8.9–12.7)
POTASSIUM SERPL-SCNC: 4.8 MMOL/L (ref 3.5–5.3)
RBC # BLD AUTO: 3.94 MILLION/UL (ref 3.88–5.62)
SODIUM SERPL-SCNC: 132 MMOL/L (ref 135–147)
WBC # BLD AUTO: 9.75 THOUSAND/UL (ref 4.31–10.16)

## 2024-07-06 PROCEDURE — 99232 SBSQ HOSP IP/OBS MODERATE 35: CPT | Performed by: INTERNAL MEDICINE

## 2024-07-06 PROCEDURE — 85025 COMPLETE CBC W/AUTO DIFF WBC: CPT | Performed by: STUDENT IN AN ORGANIZED HEALTH CARE EDUCATION/TRAINING PROGRAM

## 2024-07-06 PROCEDURE — 80048 BASIC METABOLIC PNL TOTAL CA: CPT | Performed by: INTERNAL MEDICINE

## 2024-07-06 PROCEDURE — 82948 REAGENT STRIP/BLOOD GLUCOSE: CPT

## 2024-07-06 PROCEDURE — 99239 HOSP IP/OBS DSCHRG MGMT >30: CPT | Performed by: STUDENT IN AN ORGANIZED HEALTH CARE EDUCATION/TRAINING PROGRAM

## 2024-07-06 PROCEDURE — 84100 ASSAY OF PHOSPHORUS: CPT | Performed by: INTERNAL MEDICINE

## 2024-07-06 RX ORDER — ENOXAPARIN SODIUM 100 MG/ML
30 INJECTION SUBCUTANEOUS
Status: DISCONTINUED | OUTPATIENT
Start: 2024-07-06 | End: 2024-07-06 | Stop reason: HOSPADM

## 2024-07-06 RX ORDER — NIFEDIPINE 30 MG/1
30 TABLET, EXTENDED RELEASE ORAL
Start: 2024-07-06

## 2024-07-06 RX ORDER — ACETAMINOPHEN 325 MG/1
650 TABLET ORAL EVERY 6 HOURS PRN
Start: 2024-07-06

## 2024-07-06 RX ORDER — OXYCODONE HYDROCHLORIDE 10 MG/1
10 TABLET ORAL EVERY 8 HOURS PRN
Start: 2024-07-06 | End: 2024-07-16

## 2024-07-06 RX ORDER — ENOXAPARIN SODIUM 100 MG/ML
30 INJECTION SUBCUTANEOUS
Start: 2024-07-06 | End: 2024-08-14

## 2024-07-06 RX ADMIN — OXYCODONE HYDROCHLORIDE 5 MG: 5 TABLET ORAL at 02:52

## 2024-07-06 RX ADMIN — GABAPENTIN 200 MG: 100 CAPSULE ORAL at 09:55

## 2024-07-06 RX ADMIN — SEVELAMER HYDROCHLORIDE 800 MG: 800 TABLET, FILM COATED PARENTERAL at 12:37

## 2024-07-06 RX ADMIN — DOCUSATE SODIUM 100 MG: 100 CAPSULE, LIQUID FILLED ORAL at 09:55

## 2024-07-06 RX ADMIN — ALLOPURINOL 100 MG: 100 TABLET ORAL at 09:55

## 2024-07-06 RX ADMIN — SEVELAMER HYDROCHLORIDE 800 MG: 800 TABLET, FILM COATED PARENTERAL at 09:55

## 2024-07-06 RX ADMIN — ENOXAPARIN SODIUM 30 MG: 30 INJECTION SUBCUTANEOUS at 16:06

## 2024-07-06 RX ADMIN — ACETAMINOPHEN 975 MG: 325 TABLET ORAL at 05:24

## 2024-07-06 RX ADMIN — HEPARIN SODIUM 5000 UNITS: 5000 INJECTION, SOLUTION INTRAVENOUS; SUBCUTANEOUS at 05:26

## 2024-07-06 RX ADMIN — OXYCODONE HYDROCHLORIDE 5 MG: 5 TABLET ORAL at 09:55

## 2024-07-06 RX ADMIN — ACETAMINOPHEN 650 MG: 325 TABLET ORAL at 16:02

## 2024-07-06 NOTE — DISCHARGE SUMMARY
"[facility]  Discharge- Naresh Carpio 1960, 63 y.o. male MRN: [id number]  Unit/Bed#: [address] [place] [address] Encounter: [id number]  Primary Care Provider: No primary care provider on file.   Date and time admitted to hospital: 7/2/2024  5:36 PM    * Closed fracture of right pelvis (HCC)  Assessment & Plan  Patient presents after mechanical fall landing on buttocks with right buttock pain.     CT pelvis shows right pelvic and bilateral sacral fractures. Specifically, right inferior pubic ramus fracture, the posterior fracture fragment is displaced approximately one shaft width medially, Nondisplaced right anterior acetabular fracture, Nondisplaced fracture through superior right iliac wing extending into a minimally displaced fracture of the right iliac body. Minimally displaced bilateral S1 sacral fractures.  OOB, encourage ambulation per Ortho recs  Scheduled/PRN analgesia unresolved  D/W Ortho recs:  non-operative intervention   TTWB RLE/WBAT LLE  remain on DVT prophylaxis for the next six weeks.   Follow up one to two weeks after discharge for repeat x-rays.       Bilateral sacral fracture, closed (HCC)  Assessment & Plan  CT pelvis shows Minimally displaced bilateral S1 sacral fractures   2/2 mechanical fall   Management as stated above under \"closed fracture of right pelvis\"     Type 2 diabetes mellitus, without long-term current use of insulin ([facility])  Assessment & Plan  Lab Results   Component Value Date    HGBA1C 6.2 (H) 07/02/2024         SSI with accuchecks  Encourage diet control on discharge and close follow-up with PCP for consideration of initiation of metformin      ESRD (end stage renal disease) ([facility])  Assessment & Plan  Lab Results   Component Value Date    EGFR 5 07/06/2024    EGFR 8 07/05/2024    EGFR 6 07/04/2024    CREATININE 9.00 (H) 07/06/2024    CREATININE 6.53 (H) 07/05/2024    CREATININE 8.00 (H) 07/04/2024     HD TTS at [facility]; PATRICIA chacon   Missed HD on " Saturday due to illness and today (Tuesday) due to fall   no hyperkalemia, uremic sx, fluid overload   D/W nephro recs:  HD TTS schedule      Essential hypertension  Assessment & Plan  BP above goal possibly 2/2 pain   Continue Nifedipine nightly        Medical Problems       Resolved Problems  Date Reviewed: 7/3/2024   None       Discharging Physician / Practitioner: Angie Bernard MD  PCP: No primary care provider on file.  Admission Date:   Admission Orders (From admission, onward)       Ordered        07/02/24 2200  INPATIENT ADMISSION  Once                          Discharge Date: 07/06/24      Consultations During Hospital Stay:  Ortho, podiatry, nephrology    Procedures Performed:   HD    Significant Findings / Test Results:   N/A    Incidental Findings:   Pelvic fracture as listed on MAR    Test Results Pending at Discharge (will require follow up):   N/A     Outpatient Tests Requested:  X-rays per Ortho in 2 weeks    Complications:  N/A        Hospital Course:   Naresh Carpio is a 63 y.o. male patient who originally presented to the hospital on 7/2/2024 due to fall.  Upon arrival CT demonstrated Right pelvic and bilateral sacral fractures, nonoperable per orthopedic team with no surgical intervention, continue DVT prophylaxis for 6 weeks, follow-up outpatient Ortho 2 weeks for repeat imaging, and ambulation recs written on MAR for PT/OT.  Patient given course of pain medication on discharge, follow-up with orthopedic team and PCP for further management.  Patient has ESRD on HD and will continue HD on discharge and follow-up with primary nephrologist; encourage compliance with medications.  Patient noted to have type II DM in prediabetic range, continue diet control, follow-up with PCP for possible initiation of medication.  Patient has history of hypertension continue home nifedipine.  Patient has a history of right hallux amputation and follow-up with podiatry.    Please see above list of diagnoses and  "related plan for additional information.     Condition at Discharge: fair    Discharge Day Visit / Exam:   Subjective: Patient seen and examined bedside, denied any headache, blurry vision, chest pain, shortness of breath, reported pain was okay today, discussed discharge recommendations.    Vitals: Blood Pressure: 147/57 (07/06/24 1210)  Pulse: 60 (07/06/24 1210)  Temperature: 98 °F (36.7 °C) (07/06/24 1210)  Temp Source: Oral (07/06/24 1210)  Respirations: 16 (07/06/24 1210)  Height: 6' 1\" (185.4 cm) (07/03/24 1440)  Weight - Scale: 108 kg (237 lb 4.8 oz) (07/06/24 0537)  SpO2: 92 % (07/06/24 0830)    Exam:   Physical Exam  Vitals and nursing note reviewed.   Constitutional:       General: He is not in acute distress.     Appearance: He is well-developed.   HENT:      Head: Normocephalic and atraumatic.   Eyes:      Conjunctiva/sclera: Conjunctivae normal.   Cardiovascular:      Rate and Rhythm: Normal rate and regular rhythm.      Heart sounds: No murmur heard.  Pulmonary:      Effort: Pulmonary effort is normal. No respiratory distress.      Breath sounds: Normal breath sounds. No wheezing or rhonchi.   Abdominal:      Palpations: Abdomen is soft.      Tenderness: There is no abdominal tenderness.   Musculoskeletal:      Cervical back: Neck supple.   Skin:     General: Skin is warm and dry.      Capillary Refill: Capillary refill takes less than 2 seconds.   Neurological:      Mental Status: He is alert and oriented to person, place, and time.   Psychiatric:         Mood and Affect: Mood normal.          Discussion with Family:  Patient.     Discharge instructions/Information to patient and family:   See after visit summary for information provided to patient and family.      Provisions for Follow-Up Care:  See after visit summary for information related to follow-up care and any pertinent home health orders.      Mobility at time of Discharge:   Basic Mobility Inpatient Raw Score: 9  -HLM Goal: 3: Sit at edge " of bed  JH-HLM Achieved: 2: Bed activities/Dependent transfer  HLM Goal NOT achieved. Continue to encourage mobility in post discharge setting.     Disposition:   Acute Rehab at approved facility    Planned Readmission: No     Discharge Statement:  I spent 40 minutes discharging the patient. This time was spent on the day of discharge. I had direct contact with the patient on the day of discharge. Greater than 50% of the total time was spent examining patient, answering all patient questions, arranging and discussing plan of care with patient as well as directly providing post-discharge instructions.  Additional time then spent on discharge activities.    Discharge Medications:  See after visit summary for reconciled discharge medications provided to patient and/or family.      **Please Note: This note may have been constructed using a voice recognition system**

## 2024-07-06 NOTE — [place] UNIVERSAL TRANSFER FORM
"[place] UNIVERSAL TRANSFER FOR[place]  (ALL ITEMS MUST BE COMPLETED)    1. TRANSFER FROM: [facility]      TRANSFER TO: [facility]    2. DATE OF TRANSFER: 7/6/2024                        TIME OF TRANSFER: 1600    3. PATIENT NAME: Naresh Carpio D      YOB: 1960                             GENDER: male    4. LANGUAGE:   English    5. PHYSICIAN NAME:  Angie Bernard MD                   PHONE: [phone]    6. CODE STATUS: Level 1 - Full Code        Out of Hospital DNR Attached: No. Pt is full code.    7. :                                      :  Extended Emergency Contact Information  Primary Emergency Contact: Mercedes Resendez  Mobile Phone: [phone]  Relation: Friend  Secondary Emergency Contact: Praveena Carpio  Mobile Phone: [phone]  Relation: Daughter           Health Care Representative/Proxy:  Yes           Legal Guardian:  Yes             NAME OF:           HEALTH CARE REPRESENTATIVE/PROXY:                                         OR           LEGAL GUARDIAN, IF NOT :                                               PHONE:  (Day)           (Night)                        (Cell)    8. REASON FOR TRANSFER: (Must include brief medical history and recent changes in physical function or cognition.) Short term rehab.related to closed fracture of right pelvis.            V/S: /57 (BP Location: Right arm)   Pulse 60   Temp 98 °F (36.7 °C) (Oral)   Resp 16   Ht 6' 1\" (1.854 m)   Wt 108 kg (237 lb 4.8 oz)   SpO2 92%   BMI 31.31 kg/m²           PAIN: Yes, with activity.    9. PRIMARY DIAGNOSIS: Closed fracture of right pelvis (HCC)      Secondary Diagnosis:         Pacemaker: No.      Internal Defib: No          Mental Health Diagnosis (if Applicable): No    10. RESTRAINTS: No       11. RESPIRATORY NEEDS: None    12. ISOLATION/PRECAUTION: Yes, pt is contact isolation for MDRO    13. ALLERGY: No " known allergies    14. SENSORY: Tingling of lower extremeties.           15. SKIN CONDITION: Right elbow scabs    16. DIET: Renal, 1500 fluid restriction    17. IV ACCESS: Permacath for HD    18. PERSONAL ITEMS SENT WITH PATIENT: Black sneakers, $127.00 cash,     19. ATTACHED DOCUMENTS: Face sheets, AVS, MAR, H&P.    20. AT RISK ALERTS:Fall alert        HARM TO: No harm to self or others.    21. WEIGHT BEARING STATUS:         Left Leg: Full        Right Leg: Limited    22. MENTAL STATUS:Alert    23. FUNCTION:        Walk: With help        Transfer: With help        Toilet: With help        Feed: Can feed self.    24. IMMUNIZATIONS/SCREENING:     Immunization History   Administered Date(s) Administered    COVID-19 J&J (Jessica) vaccine 0.5 mL 05/07/2021    Pneumococcal Polysaccharide PPV23 07/14/2022       25. BOWEL: Continent, Last BM 7/4/2024    26. BLADDER: Continent    27. SENDING FACILITY CONTACT: Rola Iglesias RN                  Title: RN        Unit: [address] [place] [address]        Phone: [phone]          REC'G FACILITY CONTACT (if known):        Title:        Unit:         Phone:         FORM PREFILLED BY (if applicable)       Title:       Unit:        Phone:         FORM COMPLETED BY Rola Iglesias RN      Title: Brigette      Phone: [phone]

## 2024-07-06 NOTE — NURSING NOTE
Discharge instructions called to [facility] and given to Stephanie BUSTILLO LPN.

## 2024-07-06 NOTE — PROGRESS NOTES
NEPHROLOGY HOSPITAL PROGRESS NOTE   Naresh Carpio 63 y.o. male MRN: [id number]  Unit/Bed#: [address] [place] [address] Encounter: [id number]  Reason for Consult: ESRD on HD    ASSESSMENT and PLAN:  ESRD on HD ([facility]):  Plan for HD today.   Labs are pending.      Access: Right IJ PermCath.     Hypertension:  BP acceptable. .    kg.  He was down to 105.7 kg last HD treatment.   Home Rx: None.   Current Rx: Nifedipine 30 mg OD  No changes.   Likely plan to decrease EDW to around 105.5 kg given weight loss in the hospital.     Anemia:  Hemoglobin at goal.  Follow up CBC today.      Mineral and bone disease:  OP Rx: Sevelamer and Velphoro   Phos is above goal. Follow up phos.   Continue sevelamer 800 mg TID in the hospital.  Restart Velphoro on discharge.   Continue low phos diet.      Hyponatremia:  Continue fluid restriction to 1.5 L per 24 hours.    Right pelvic and bilateral sacral fractures  Fall    PLAN SUMMARY:  HD today.  UF to get to around 105.5 kg or 106 kg today  Follow up today's labs.   Stable for discharge from renal standpoint.    SUBJECTIVE / 24H INTERVAL HISTORY:  No new acute events.   No CP or SOB.     OBJECTIVE:  Current Weight: Weight - Scale: 108 kg (237 lb 4.8 oz)  Vitals:    07/05/24 1830 07/05/24 2225 07/06/24 0537 07/06/24 0813   BP: 136/54 151/56  141/55   Pulse: 74 72  66   Resp: 20 18  16   Temp: 99.2 °F (37.3 °C) 99.2 °F (37.3 °C)  97.9 °F (36.6 °C)   TempSrc:       SpO2: 90% 93%  91%   Weight:   108 kg (237 lb 4.8 oz)    Height:           Intake/Output Summary (Last 24 hours) at 7/6/2024 0902  Last data filed at 7/6/2024 0830  Gross per 24 hour   Intake 610 ml   Output --   Net 610 ml     General: conscious, cooperative, no distress  Skin: dry  Eyes: pink conjunctivae  ENT: moist mucous membranes  Respiratory: equal chest expansion, clear breath sounds.  Cardiovascular: distinct heart sounds, normal rate, regular rhythm, no rub  Abdomen: soft, non tender, non distended, normal  bowel sounds  Extremities: no edema.   Genitourinary: no gaitan catheter.   Neuro: awake, alert.   Psych: appropriate affect    Medications:    Current Facility-Administered Medications:     acetaminophen (TYLENOL) tablet 650 mg, 650 mg, Oral, Q6H PRN, Niharika Russell PA-C, 650 mg at 07/04/24 1154    acetaminophen (TYLENOL) tablet 975 mg, 975 mg, Oral, Q8H KEMAL, AlmaseliceoasifGRANT Thompson, 975 mg at 07/06/24 0524    allopurinol (ZYLOPRIM) tablet 100 mg, 100 mg, Oral, Daily, Niharika Russell PA-C, 100 mg at 07/05/24 0913    atorvastatin (LIPITOR) tablet 80 mg, 80 mg, Oral, Daily With Dinner, Niharika Russell PA-C, 80 mg at 07/05/24 1624    docusate sodium (COLACE) capsule 100 mg, 100 mg, Oral, BID, Niharika Russell PA-C, 100 mg at 07/05/24 1756    gabapentin (NEURONTIN) capsule 200 mg, 200 mg, Oral, BID, Niharika Russell PA-C, 200 mg at 07/05/24 1756    heparin (porcine) subcutaneous injection 5,000 Units, 5,000 Units, Subcutaneous, Q8H KEMAL, Niharika Russell PA-C, 5,000 Units at 07/06/24 0526    HYDROmorphone (DILAUDID) injection 0.5 mg, 0.5 mg, Intravenous, Q6H PRN, Angie Bernard MD, 0.5 mg at 07/05/24 1150    HYDROmorphone (DILAUDID) injection 1 mg, 1 mg, Intravenous, Q4H PRN, Angie Bernard MD    insulin lispro (HumALOG/ADMELOG) 100 units/mL subcutaneous injection 1-6 Units, 1-6 Units, Subcutaneous, TID AC **AND** Fingerstick Glucose (POCT), , , TID AC, Niharika Russell PA-C    insulin lispro (HumALOG/ADMELOG) 100 units/mL subcutaneous injection 1-6 Units, 1-6 Units, Subcutaneous, HS, Niharika Russell PA-C, 1 Units at 07/04/24 2127    NIFEdipine (PROCARDIA XL) 24 hr tablet 30 mg, 30 mg, Oral, After Dinner, Hamilton Mack MD, 30 mg at 07/05/24 1756    oxyCODONE (ROXICODONE) IR tablet 5 mg, 5 mg, Oral, Q6H PRN, Angie Bernard MD, 5 mg at 07/06/24 0252    polyethylene glycol (MIRALAX) packet 17 g, 17 g, Oral, Daily PRN, Niharika Russell PA-C, 17 g at 07/05/24 0913    sevelamer  (RENAGEL) tablet 800 mg, 800 mg, Oral, TID With Meals, Niharika Russell PA-C, 800 mg at 07/05/24 1624    Laboratory Results:  Results from last 7 days   Lab Units 07/05/24  0548 07/04/24  0451 07/03/24  0436 07/03/24  0137 07/02/24  1804   WBC Thousand/uL 10.42* 9.94 7.68  --  7.35   HEMOGLOBIN g/dL 11.8* 13.3 13.5 13.6 14.1   HEMATOCRIT % 37.0 41.2 41.2 41.7 43.2   PLATELETS Thousands/uL 175 201 205  --  205   POTASSIUM mmol/L 4.4 4.8 4.5  --  4.5   CHLORIDE mmol/L 95* 96 90*  --  92*   CO2 mmol/L 26 21 26  --  20*   BUN mg/dL 28* 39* 72*  --  69*   CREATININE mg/dL 6.53* 8.00* 11.51*  --  11.37*   CALCIUM mg/dL 8.1* 8.4 8.1*  --  8.2*   MAGNESIUM mg/dL  --   --  2.2  --  2.1   PHOSPHORUS mg/dL  --   --  7.6*  --   --

## 2024-07-06 NOTE — DISCHARGE INSTRUCTIONS
right inferior pubic rami, right anterior acetabular, right superior iliac wing, and bilateral S1 sacral fractures   remain on DVT prophylaxis for the next six weeks.   Follow up one to two weeks after discharge for repeat x-rays.

## 2024-07-06 NOTE — PLAN OF CARE
Problem: PAIN - ADULT  Goal: Verbalizes/displays adequate comfort level or baseline comfort level  Description: Interventions:  - Encourage patient to monitor pain and request assistance  - Assess pain using appropriate pain scale  - Administer analgesics based on type and severity of pain and evaluate response  - Implement non-pharmacological measures as appropriate and evaluate response  - Consider cultural and social influences on pain and pain management  - Notify physician/advanced practitioner if interventions unsuccessful or patient reports new pain  Outcome: Progressing     Problem: INFECTION - ADULT  Goal: Absence or prevention of progression during hospitalization  Description: INTERVENTIONS:  - Assess and monitor for signs and symptoms of infection  - Monitor lab/diagnostic results    Outcome: Progressing  Goal: Absence of fever/infection during neutropenic period  Description: INTERVENTIONS:  - Monitor WBC    Outcome: Progressing     Problem: SAFETY ADULT  Goal: Patient will remain free of falls  Description: INTERVENTIONS:  - Educate patient/family on patient safety including physical limitations  - Instruct patient to call for assistance with activity   - Consult OT/PT to assist with strengthening/mobility   - Keep Call bell within reach    Outcome: Progressing  Goal: Maintain or return to baseline ADL function  Description: INTERVENTIONS:  -  Assess patient's ability to carry out ADLs; assess patient's baseline for ADL function and identify physical deficits which impact ability to perform ADLs (bathing, care of mouth/teeth, toileting, grooming, dressing, etc.)  - Assess/evaluate cause of self-care deficits   - Assess range of motion    Outcome: Progressing  Goal: Maintains/Returns to pre admission functional level  Description: INTERVENTIONS:  - Perform AM-PAC 6 Click Basic Mobility/ Daily Activity assessment daily.  - Set and communicate daily mobility goal to care team and  patient/family/caregiver.     - Record patient progress and toleration of activity level   Outcome: Progressing     Problem: MUSCULOSKELETAL - ADULT  Goal: Maintain or return mobility to safest level of function  Description: INTERVENTIONS:  - Assess patient's ability to carry out ADLs; assess patient's baseline for ADL function and identify physical deficits which impact ability to perform ADLs (bathing, care of mouth/teeth, toileting, grooming, dressing, etc.)  - Assess/evaluate cause of self-care deficits   - Assess range of motion  - Assess patient's mobility  - Assess patient's need for assistive devices and provide as appropriate  - Encourage maximum independence but intervene and supervise when necessary  - Involve family in performance of ADLs  - Assess for home care needs following discharge   - Consider OT consult to assist with ADL evaluation and planning for discharge  - Provide patient education as appropriate  Outcome: Progressing     Problem: MUSCULOSKELETAL - ADULT  Goal: Maintain or return mobility to safest level of function  Description: INTERVENTIONS:  - Assess patient's ability to carry out ADLs; assess patient's baseline for ADL function and identify physical deficits which impact ability to perform ADLs (bathing, care of mouth/teeth, toileting, grooming, dressing, etc.)  - Assess/evaluate cause of self-care deficits   - Assess range of motion  - Assess patient's mobility  - Assess patient's need for assistive devices and provide as appropriate  - Encourage maximum independence but intervene and supervise when necessary  - Involve family in performance of ADLs  - Assess for home care needs following discharge   - Consider OT consult to assist with ADL evaluation and planning for discharge  - Provide patient education as appropriate  Outcome: Progressing     Problem: Prexisting or High Potential for Compromised Skin Integrity  Goal: Skin integrity is maintained or improved  Description:  INTERVENTIONS:  - Identify patients at risk for skin breakdown  - Assess and monitor skin integrity  - Assess and monitor nutrition and hydration status  - Monitor labs   - Assess for incontinence   - Turn and reposition patient  - Assist with mobility/ambulation  - Relieve pressure over bony prominences  - Avoid friction and shearing  - Provide appropriate hygiene as needed including keeping skin clean and dry  - Evaluate need for skin moisturizer/barrier cream  - Collaborate with interdisciplinary team   - Patient/family teaching  - Consider wound care consult   Outcome: Progressing     Problem: METABOLIC, FLUID AND ELECTROLYTES - ADULT  Goal: Electrolytes maintained within normal limits  Description: INTERVENTIONS:  - Monitor labs and assess patient for signs and symptoms of electrolyte imbalances  - Administer electrolyte replacement as ordered  - Monitor response to electrolyte replacements, including repeat lab results as appropriate  - Instruct patient on fluid and nutrition as appropriate  Outcome: Progressing  Goal: Fluid balance maintained  Description: INTERVENTIONS:  - Monitor labs   - Monitor I/O and WT  - Instruct patient on fluid and nutrition as appropriate  - Assess for signs & symptoms of volume excess or deficit  Outcome: Progressing     Problem: Nutrition/Hydration-ADULT  Goal: Nutrient/Hydration intake appropriate for improving, restoring or maintaining nutritional needs  Description: Monitor and assess patient's nutrition/hydration status for malnutrition. Collaborate with interdisciplinary team and initiate plan and interventions as ordered.  Monitor patient's weight and dietary intake as ordered or per policy. Utilize nutrition screening tool and intervene as necessary. Determine patient's food preferences and provide high-protein, high-caloric foods as appropriate.     INTERVENTIONS:  - Monitor oral intake, urinary output, labs, and treatment plans  - Assess nutrition and hydration status  and recommend course of action  - Evaluate amount of meals eaten  - Assist patient with eating if necessary   - Allow adequate time for meals  - Recommend/ encourage appropriate diets, oral nutritional supplements, and vitamin/mineral supplements  - Order, calculate, and assess calorie counts as needed  - Recommend, monitor, and adjust tube feedings and TPN/PPN based on assessed needs  - Assess need for intravenous fluids  - Provide specific nutrition/hydration education as appropriate  - Include patient/family/caregiver in decisions related to nutrition  Outcome: Progressing

## 2024-07-06 NOTE — PLAN OF CARE
Post-Dialysis RN Treatment Note    Blood Pressure:  Pre:  120/50 mm/Hg  Post: 147/57 mmHg   EDW:   108.0 kg    Weight:  Pre: 106.1 kg   Post: 105.2 kg   Mode of weight measurement:    Bed Scale   Volume Removed:  900 ml    Treatment duration:   220 minutes    NS given:  No    Treatment shortened? Yes, describe: System Clotted   Medications given during Rx:    Not Applicable   Estimated Kt/V:      Not Applicable   Access type:     Permacath/TDC   Access Issues:     No    Report called to primary nurse:    Yes, June    Problem: METABOLIC, FLUID AND ELECTROLYTES - ADULT  Goal: Electrolytes maintained within normal limits  Description: INTERVENTIONS:  - Monitor labs and assess patient for signs and symptoms of electrolyte imbalances  - Administer electrolyte replacement as ordered  - Monitor response to electrolyte replacements, including repeat lab results as appropriate  - Instruct patient on fluid and nutrition as appropriate  Outcome: Progressing  Goal: Fluid balance maintained  Description: INTERVENTIONS:  - Monitor labs   - Monitor I/O and WT  - Instruct patient on fluid and nutrition as appropriate  - Assess for signs & symptoms of volume excess or deficit  Outcome: Progressing

## 2024-07-06 NOTE — PHYSICAL THERAPY NOTE
"      07/06/24 9087   Note Type   Note Type Cancelled Session   Cancel Reasons Other  (Attempted to see pt for PT session this pm. Pt declining - \"I'm getting ready to leave at 4\")   Licensure   [place] License Number  Brigitte Walter, PT [id number]       "

## 2024-07-15 ENCOUNTER — TELEPHONE (OUTPATIENT)
Age: 64
End: 2024-07-15

## 2024-07-15 NOTE — TELEPHONE ENCOUNTER
Margarette gutiérrez and she advised to call and see how pt is doing, advised xrays can be taken there.    Nursing home states patient is doing good, they will perform xray at facility fax the report and mail the disk. Provided fax # and address of office.

## 2024-07-15 NOTE — TELEPHONE ENCOUNTER
Caller: [facility]     Doctor: Jose    Reason for call: the facility called asking if the patient would have to come in to the office for a 2 week follow up appt. The facility does not provide transportation and the patient can not afford transportation. The pt would have the xray done at the facility    Call back#: [phone] ext [phone] or [phone]

## 2024-08-19 ENCOUNTER — TELEPHONE (OUTPATIENT)
Dept: OBGYN CLINIC | Facility: CLINIC | Age: 64
End: 2024-08-19

## 2024-08-19 NOTE — TELEPHONE ENCOUNTER
Spoke with facility, patient needs to see trauma surgeon, either Dr. Ronquillo, Dr. Krishnamurthy, or Dr. Ramos. Since Dr. Cisneros does not see pelvic/sacral fractures. Appt made with Dr. Ronquillo. Facility will call back if they need to reschedule appt. Unfortunately, no provider in the [place] office will be able to see patient.

## 2024-08-26 ENCOUNTER — OFFICE VISIT (OUTPATIENT)
Dept: OBGYN CLINIC | Facility: CLINIC | Age: 64
End: 2024-08-26
Payer: MEDICARE

## 2024-08-26 ENCOUNTER — APPOINTMENT (OUTPATIENT)
Dept: RADIOLOGY | Facility: AMBULARY SURGERY CENTER | Age: 64
End: 2024-08-26
Attending: STUDENT IN AN ORGANIZED HEALTH CARE EDUCATION/TRAINING PROGRAM
Payer: MEDICARE

## 2024-08-26 VITALS — HEIGHT: 73 IN | WEIGHT: 237.3 LBS | BODY MASS INDEX: 31.45 KG/M2

## 2024-08-26 DIAGNOSIS — S32.10XA CLOSED FRACTURE OF SACRUM, UNSPECIFIED PORTION OF SACRUM, INITIAL ENCOUNTER (HCC): ICD-10-CM

## 2024-08-26 DIAGNOSIS — S32.10XA CLOSED FRACTURE OF SACRUM, UNSPECIFIED PORTION OF SACRUM, INITIAL ENCOUNTER (HCC): Primary | ICD-10-CM

## 2024-08-26 PROCEDURE — 72190 X-RAY EXAM OF PELVIS: CPT

## 2024-08-26 PROCEDURE — 99204 OFFICE O/P NEW MOD 45 MIN: CPT | Performed by: STUDENT IN AN ORGANIZED HEALTH CARE EDUCATION/TRAINING PROGRAM

## 2024-08-26 NOTE — PROGRESS NOTES
Orthopaedics Office Visit - New Patient Visit    ASSESSMENT/PLAN:    Assessment:   Right lateral compression II pelvic ring injury, DOI: 7/2/24  Left zone 1 sacral fracture, DOI: 7/2/24    Plan:   X-rays reviewed and discussed with patient revealing interval and internal rotation deformity of the right hemipelvis.  The patient has continuing consolidation at his posterior ilium fracture.  No SI joint widening.  On the inlet views there is some asymmetry between the left and right hemipelvis.  Discussed with patient the deformity that is occurred in his pelvic ring.  We discussed that since this is 6 weeks out from the date of injury that is likelihood of being able to correct the pelvic rotation and surgically stabilize it would be slim especially in the setting of his end-stage renal disease on dialysis.  Where risks with outweigh benefits.  The patient on exam has no pain with lateral compression of the pelvis.  He has no pain with heel strike Flamingo stance or frog-leg.  Given his general resolution of symptoms we discussed we could either at this time surgically fixate the posterior pelvic ring in its current position or since symptoms are mild at this point allow him to start progressive weightbearing and determine whether he is able to tolerate this without increasing the amount of pelvic deformity currently present.  The patient at this time would like to proceed with the progressive weightbearing followed by repeat radiographs in 4 weeks after initiation of full weightbearing prior to proceeding with SI screws for the posterior pelvic ring  Pt to begin physical therapy for strength and mobility training, new script given  Patient will begin 50% weightbearing to the right lower extremity for the next 2 weeks followed by weightbearing as tolerated to the right lower extremity.  He can continue to be weightbearing as tolerated to left lower extremity  Pt to continue at home analgesic regimen with Tylenol   Pt  to follow up in 4 weeks for repeat x-ray and re-evaluation       To Do Next Visit:  Repeat xrays     _____________________________________________________  CHIEF COMPLAINT:  Chief Complaint   Patient presents with    Pelvis - Pain, Fracture         SUBJECTIVE:  Naresh Carpio is a 64 y.o. male who presents for evaluation of right sided pelvic ring injury sustained after a mechanical ground-level fall, DOI: 7/2/2024.  At the time of injury, patient presented to the hospital after sustaining a mechanical fall.  A CT scan was obtained demonstrating a right sided pelvic ring injury and a left sacral fracture.  Patient was instructed to follow-up outpatient with orthopedics and continue with nonoperative management.  He was placed on DVT prophylaxis for 6 weeks and made toe-touch weightbearing right lower extremity and weightbearing as tolerated left lower extremity.  Patient was discharged to nursing home for which she is at currently.  He reports that his pain has been improving and is worse with ambulation and relieved by rest.  He rates his pain 3 out of 10 in severity when sitting and 6 out of 10 when first getting up in the morning.  Past medical history significant for type 2 diabetes, last HbA1c 6.2 on 7/2/2024, and end-stage kidney disease on dialysis.    PAST MEDICAL HISTORY:  Past Medical History:   Diagnosis Date    CKD     Diabetes mellitus (HCC)     Hyperlipidemia     Hypertension     Left toe amputee (HCC)     TIA (transient ischemic attack)        PAST SURGICAL HISTORY:  Past Surgical History:   Procedure Laterality Date    AMPUTATION Left     left foot resection 10 years ago dr tran    IR LOWER EXTREMITY ANGIOGRAM  08/29/2023    IR TEMPORARY DIALYSIS CATHETER PLACEMENT  08/25/2023    IR TUNNELED CENTRAL LINE REMOVAL  08/23/2023    IR TUNNELED DIALYSIS CATHETER PLACEMENT  01/27/2022    IR TUNNELED DIALYSIS CATHETER PLACEMENT  08/30/2023    ND AMPUTATION METATARSAL W/TOE SINGLE Right 8/31/2023     Procedure: RIGHT PARTIAL 1ST RAY RESECTION WITH  WOUND VAC APPLICATION;  Surgeon: Won Parmar DPM;  Location: [facility];  Service: Podiatry       FAMILY HISTORY:  No family history on file.    SOCIAL HISTORY:  Social History     Tobacco Use    Smoking status: Never     Passive exposure: Never    Smokeless tobacco: Never   Vaping Use    Vaping status: Never Used   Substance Use Topics    Alcohol use: Not Currently     Alcohol/week: 0.0 standard drinks of alcohol     Comment: 0    Drug use: Never       MEDICATIONS:    Current Outpatient Medications:     acetaminophen (TYLENOL) 325 mg tablet, Take 2 tablets (650 mg total) by mouth every 6 (six) hours as needed for mild pain, headaches or fever, Disp: , Rfl:     allopurinol (ZYLOPRIM) 100 mg tablet, Take 1 tablet (100 mg total) by mouth daily, Disp: 30 tablet, Rfl: 0    atorvastatin (LIPITOR) 80 mg tablet, Take 80 mg by mouth daily, Disp: , Rfl:     docusate sodium (COLACE) 100 mg capsule, Take 1 capsule (100 mg total) by mouth 2 (two) times a day, Disp: 60 capsule, Rfl: 0    enoxaparin (LOVENOX) 30 mg/0.3 mL, Inject 0.3 mL (30 mg total) under the skin every 24 hours, Disp: , Rfl:     gabapentin (NEURONTIN) 100 mg capsule, Take 2 capsules (200 mg total) by mouth 2 (two) times a day, Disp: 120 capsule, Rfl: 0    mupirocin (BACTROBAN) 2 % ointment, Apply topically 3 (three) times a day, Disp: , Rfl:     naloxone (NARCAN) 4 mg/0.1 mL nasal spray, Administer 1 spray into a nostril. If no response after 2-3 minutes, give another dose in the other nostril using a new spray., Disp: 1 each, Rfl: 0    NIFEdipine (PROCARDIA XL) 30 mg 24 hr tablet, Take 1 tablet (30 mg total) by mouth daily after dinner, Disp: , Rfl:     polyethylene glycol (MIRALAX) 17 g packet, Take 17 g by mouth daily Do not start before September 7, 2023., Disp: , Rfl: 0    sevelamer (RENAGEL) 800 mg tablet, Take 1 tablet (800 mg total) by mouth 3 (three) times a day with meals, Disp: , Rfl:  "0    ALLERGIES:  No Known Allergies    REVIEW OF SYSTEMS:  MSK: See HPI  Neuro: See HPI  Pertinent items are otherwise noted in HPI.  A comprehensive review of systems was otherwise negative.    LABS:  HgA1c:   Lab Results   Component Value Date    HGBA1C 6.2 (H) 07/02/2024     BMP:   Lab Results   Component Value Date    CALCIUM 8.5 07/06/2024    K 4.8 07/06/2024    CO2 26 07/06/2024    CL 93 (L) 07/06/2024    BUN 44 (H) 07/06/2024    CREATININE 9.00 (H) 07/06/2024     CBC: No components found for: \"CBC\"    _____________________________________________________  PHYSICAL EXAMINATION:  Vital signs: Ht 6' 1\" (1.854 m)   Wt 108 kg (237 lb 4.8 oz)   BMI 31.31 kg/m²   General: No acute distress, awake and alert  Psychiatric: Mood and affect appear appropriate  HEENT: Trachea Midline, No torticollis, no apparent facial trauma  Cardiovascular: No audible murmurs; Extremities appear perfused  Pulmonary: No audible wheezing or stridor  Skin: No open lesions; see further details (if any) below    MUSCULOSKELETAL EXAMINATION:  Extremities: right pelvis   The right lower extremity was exposed and inspected. Visible skin intact without erythema, ecchymosis, effusion or obvious osseous deformity.  No tenderness to palpation anterior groin or SI.  Mild discomfort with frog-leg position right lower extremity.  No pain with pelvic compression.  No pain with hip circumduction.  No pain with Flamingo stance.  No pain with heel strike sensation intact to superficial peroneal, deep peroneal, sural, saphenous, plantar nerve distributions. Motor intact to tibialis anterior, gastrocnemius muscles, extensor mechanism intact. Limb is well perfused. Brisk capillary refill in all 5 digits. Compartments soft and compressible.        _____________________________________________________  STUDIES REVIEWED:  I personally reviewed the images and interpretation is as follows:  CT pelvis obtained 7/2/2024 show a right superior pubic root fracture " and right sided posterior ilium fracture with SI joint involvement and a left zone 1 sacral fracture  Xray of the pelvis obtained today show interval and internal rotation deformity of the right hemipelvis.  The patient has continuing consolidation at his posterior ilium fracture.  No SI joint widening.  On the inlet views there is some asymmetry between the left and right hemipelvis.    PROCEDURES PERFORMED:  Procedures    Jacek Woodard MD

## 2024-08-26 NOTE — PATIENT INSTRUCTIONS
Orthopaedics Office Visit - New Patient Visit    ASSESSMENT/PLAN:    Assessment:   Right lateral compression II pelvic ring injury, DOI: 7/2/24  Left zone 1 sacral fracture, DOI: 7/2/24    Plan:   X-rays reviewed and discussed with patient revealing interval and internal rotation deformity of the right hemipelvis.  The patient has continuing consolidation at his posterior ilium fracture.  No SI joint widening.  On the inlet views there is some asymmetry between the left and right hemipelvis.  Discussed with patient the deformity that is occurred in his pelvic ring.  We discussed that since this is 6 weeks out from the date of injury that is likelihood of being able to correct the pelvic rotation and surgically stabilize it would be slim especially in the setting of his end-stage renal disease on dialysis.  Where risks with outweigh benefits.  The patient on exam has no pain with lateral compression of the pelvis.  He has no pain with heel strike Flamingo stance or frog-leg.  Given his general resolution of symptoms we discussed we could either at this time surgically fixate the posterior pelvic ring in its current position or since symptoms are mild at this point allow him to start progressive weightbearing and determine whether he is able to tolerate this without increasing the amount of pelvic deformity currently present.  The patient at this time would like to proceed with the progressive weightbearing followed by repeat radiographs in 4 weeks after initiation of full weightbearing prior to proceeding with SI screws for the posterior pelvic ring  Pt to begin physical therapy for strength and mobility training, new script given  Patient will begin 50% weightbearing to the right lower extremity for the next 2 weeks followed by weightbearing as tolerated to the right lower extremity.  He can continue to be weightbearing as tolerated to left lower extremity  Pt to continue at home analgesic regimen with Tylenol   Pt  to follow up in 4 weeks for repeat x-ray and re-evaluation       To Do Next Visit:  Repeat xrays     _____________________________________________________  CHIEF COMPLAINT:  Chief Complaint   Patient presents with    Pelvis - Pain, Fracture         SUBJECTIVE:  Naresh Carpio is a 64 y.o. male who presents for evaluation of right sided pelvic ring injury sustained after a mechanical ground-level fall, DOI: 7/2/2024.  At the time of injury, patient presented to the hospital after sustaining a mechanical fall.  A CT scan was obtained demonstrating a right sided pelvic ring injury and a left sacral fracture.  Patient was instructed to follow-up outpatient with orthopedics and continue with nonoperative management.  He was placed on DVT prophylaxis for 6 weeks and made toe-touch weightbearing right lower extremity and weightbearing as tolerated left lower extremity.  Patient was discharged to nursing home for which she is at currently.  He reports that his pain has been improving and is worse with ambulation and relieved by rest.  He rates his pain 3 out of 10 in severity when sitting and 6 out of 10 when first getting up in the morning.  Past medical history significant for type 2 diabetes, last HbA1c 6.2 on 7/2/2024, and end-stage kidney disease on dialysis.    PAST MEDICAL HISTORY:  Past Medical History:   Diagnosis Date    CKD     Diabetes mellitus (HCC)     Hyperlipidemia     Hypertension     Left toe amputee (HCC)     TIA (transient ischemic attack)        PAST SURGICAL HISTORY:  Past Surgical History:   Procedure Laterality Date    AMPUTATION Left     left foot resection 10 years ago dr tran    IR LOWER EXTREMITY ANGIOGRAM  08/29/2023    IR TEMPORARY DIALYSIS CATHETER PLACEMENT  08/25/2023    IR TUNNELED CENTRAL LINE REMOVAL  08/23/2023    IR TUNNELED DIALYSIS CATHETER PLACEMENT  01/27/2022    IR TUNNELED DIALYSIS CATHETER PLACEMENT  08/30/2023    DC AMPUTATION METATARSAL W/TOE SINGLE Right 8/31/2023     Procedure: RIGHT PARTIAL 1ST RAY RESECTION WITH  WOUND VAC APPLICATION;  Surgeon: Won Parmar DPM;  Location: [facility];  Service: Podiatry       FAMILY HISTORY:  No family history on file.    SOCIAL HISTORY:  Social History     Tobacco Use    Smoking status: Never     Passive exposure: Never    Smokeless tobacco: Never   Vaping Use    Vaping status: Never Used   Substance Use Topics    Alcohol use: Not Currently     Alcohol/week: 0.0 standard drinks of alcohol     Comment: 0    Drug use: Never       MEDICATIONS:    Current Outpatient Medications:     acetaminophen (TYLENOL) 325 mg tablet, Take 2 tablets (650 mg total) by mouth every 6 (six) hours as needed for mild pain, headaches or fever, Disp: , Rfl:     allopurinol (ZYLOPRIM) 100 mg tablet, Take 1 tablet (100 mg total) by mouth daily, Disp: 30 tablet, Rfl: 0    atorvastatin (LIPITOR) 80 mg tablet, Take 80 mg by mouth daily, Disp: , Rfl:     docusate sodium (COLACE) 100 mg capsule, Take 1 capsule (100 mg total) by mouth 2 (two) times a day, Disp: 60 capsule, Rfl: 0    enoxaparin (LOVENOX) 30 mg/0.3 mL, Inject 0.3 mL (30 mg total) under the skin every 24 hours, Disp: , Rfl:     gabapentin (NEURONTIN) 100 mg capsule, Take 2 capsules (200 mg total) by mouth 2 (two) times a day, Disp: 120 capsule, Rfl: 0    mupirocin (BACTROBAN) 2 % ointment, Apply topically 3 (three) times a day, Disp: , Rfl:     naloxone (NARCAN) 4 mg/0.1 mL nasal spray, Administer 1 spray into a nostril. If no response after 2-3 minutes, give another dose in the other nostril using a new spray., Disp: 1 each, Rfl: 0    NIFEdipine (PROCARDIA XL) 30 mg 24 hr tablet, Take 1 tablet (30 mg total) by mouth daily after dinner, Disp: , Rfl:     polyethylene glycol (MIRALAX) 17 g packet, Take 17 g by mouth daily Do not start before September 7, 2023., Disp: , Rfl: 0    sevelamer (RENAGEL) 800 mg tablet, Take 1 tablet (800 mg total) by mouth 3 (three) times a day with meals, Disp: , Rfl:  "0    ALLERGIES:  No Known Allergies    REVIEW OF SYSTEMS:  MSK: See HPI  Neuro: See HPI  Pertinent items are otherwise noted in HPI.  A comprehensive review of systems was otherwise negative.    LABS:  HgA1c:   Lab Results   Component Value Date    HGBA1C 6.2 (H) 07/02/2024     BMP:   Lab Results   Component Value Date    CALCIUM 8.5 07/06/2024    K 4.8 07/06/2024    CO2 26 07/06/2024    CL 93 (L) 07/06/2024    BUN 44 (H) 07/06/2024    CREATININE 9.00 (H) 07/06/2024     CBC: No components found for: \"CBC\"    _____________________________________________________  PHYSICAL EXAMINATION:  Vital signs: Ht 6' 1\" (1.854 m)   Wt 108 kg (237 lb 4.8 oz)   BMI 31.31 kg/m²   General: No acute distress, awake and alert  Psychiatric: Mood and affect appear appropriate  HEENT: Trachea Midline, No torticollis, no apparent facial trauma  Cardiovascular: No audible murmurs; Extremities appear perfused  Pulmonary: No audible wheezing or stridor  Skin: No open lesions; see further details (if any) below    MUSCULOSKELETAL EXAMINATION:  Extremities: right pelvis   The right lower extremity was exposed and inspected. Visible skin intact without erythema, ecchymosis, effusion or obvious osseous deformity.  No tenderness to palpation anterior groin or SI.  Mild discomfort with frog-leg position right lower extremity.  No pain with pelvic compression.  No pain with hip circumduction.  No pain with Flamingo stance.  No pain with heel strike sensation intact to superficial peroneal, deep peroneal, sural, saphenous, plantar nerve distributions. Motor intact to tibialis anterior, gastrocnemius muscles, extensor mechanism intact. Limb is well perfused. Brisk capillary refill in all 5 digits. Compartments soft and compressible.        _____________________________________________________  STUDIES REVIEWED:  I personally reviewed the images and interpretation is as follows:  CT pelvis obtained 7/2/2024 show a right superior pubic root fracture " and right sided posterior ilium fracture with SI joint involvement and a left zone 1 sacral fracture  Xray of the pelvis obtained today show interval and internal rotation deformity of the right hemipelvis.  The patient has continuing consolidation at his posterior ilium fracture.  No SI joint widening.  On the inlet views there is some asymmetry between the left and right hemipelvis.

## 2024-09-23 ENCOUNTER — OFFICE VISIT (OUTPATIENT)
Dept: OBGYN CLINIC | Facility: CLINIC | Age: 64
End: 2024-09-23
Payer: MEDICARE

## 2024-09-23 ENCOUNTER — APPOINTMENT (OUTPATIENT)
Dept: RADIOLOGY | Facility: AMBULARY SURGERY CENTER | Age: 64
End: 2024-09-23
Attending: STUDENT IN AN ORGANIZED HEALTH CARE EDUCATION/TRAINING PROGRAM
Payer: MEDICARE

## 2024-09-23 VITALS — BODY MASS INDEX: 31.44 KG/M2 | WEIGHT: 237.25 LBS | HEIGHT: 73 IN

## 2024-09-23 DIAGNOSIS — S32.10XA CLOSED FRACTURE OF SACRUM, UNSPECIFIED PORTION OF SACRUM, INITIAL ENCOUNTER (HCC): Primary | ICD-10-CM

## 2024-09-23 DIAGNOSIS — S32.10XA CLOSED FRACTURE OF SACRUM, UNSPECIFIED PORTION OF SACRUM, INITIAL ENCOUNTER (HCC): ICD-10-CM

## 2024-09-23 PROCEDURE — 72190 X-RAY EXAM OF PELVIS: CPT

## 2024-09-23 PROCEDURE — 99213 OFFICE O/P EST LOW 20 MIN: CPT | Performed by: STUDENT IN AN ORGANIZED HEALTH CARE EDUCATION/TRAINING PROGRAM

## 2024-09-23 NOTE — PROGRESS NOTES
Orthopaedics Office Visit - follow up Patient Visit    ASSESSMENT/PLAN:    Assessment:   Right lateral compression II pelvic ring injury, DOI: 7/2/24  Left zone 1 sacral fracture, DOI: 7/2/24    Plan:   X-rays reviewed which demonstrate healing of the patient is a right sided pubic rami fractures, no rotational malalignment of the pelvic ring developing.  Weightbearing as tolerated bilateral lower extremities  Range of motion as tolerated bilateral lower extremities  Physical therapy reordered for gait training and strengthening  No activity restrictions  Patient continue taking Tylenol as needed for pain control  Patient can follow-up in 3 months for repeat x-ray evaluation    To Do Next Visit:  Reevaluate pelvic ring    _____________________________________________________  CHIEF COMPLAINT:  Chief Complaint   Patient presents with    Pelvis - Fracture, Pain         SUBJECTIVE:    Naresh Carpio is a 64 y.o. male who presents for evaluation of right sided pelvic ring injury sustained after a mechanical ground-level fall, DOI: 7/2/2024.  At the time of injury, patient presented to the hospital after sustaining a mechanical fall.  A CT scan was obtained demonstrating a right sided pelvic ring injury and a left sacral fracture.  Patient was instructed to follow-up outpatient with orthopedics and continue with nonoperative management.  He was placed on DVT prophylaxis for 6 weeks and made toe-touch weightbearing right lower extremity and weightbearing as tolerated left lower extremity.  Patient was discharged to nursing home for which she is at currently.  He reports that his pain has been improving and is worse with ambulation and relieved by rest.  He rates his pain 3 out of 10 in severity when sitting and 6 out of 10 when first getting up in the morning.  Past medical history significant for type 2 diabetes, last HbA1c 6.2 on 7/2/2024, and end-stage kidney disease on dialysis.       Interval history  9/23/2024  Naresh Carpio is a 64 y.o. male who presents for follow-up evaluation of right sided pelvic ring injury after suffering a mechanical ground-level fall 3 months ago on July 2, 2024.  He states that overall he does feel that he is improving.  He rates his improvement from last visit at approximately 60%.  He states that he does have persistent pain into the right sided low back and pelvic area.  He denies any radiation of the pain.  He rates his pain at a 5/10 on the numerical pain scale at its worst and 2/10 on the numerical pain scale at its best.  He has been participating in extensive physical therapy at his rehab facility in [place] and does feel that he would benefit from further physical therapy.  He has utilized the oxycodone for pain relief.  However, he noted that he was experiencing some slurred speech and has since discontinued the use of the oxycodone.    PAST MEDICAL HISTORY:  Past Medical History:   Diagnosis Date    CKD     Diabetes mellitus (HCC)     Hyperlipidemia     Hypertension     Left toe amputee (HCC)     TIA (transient ischemic attack)        PAST SURGICAL HISTORY:  Past Surgical History:   Procedure Laterality Date    AMPUTATION Left     left foot resection 10 years ago dr tran    IR LOWER EXTREMITY ANGIOGRAM  08/29/2023    IR TEMPORARY DIALYSIS CATHETER PLACEMENT  08/25/2023    IR TUNNELED CENTRAL LINE REMOVAL  08/23/2023    IR TUNNELED DIALYSIS CATHETER PLACEMENT  01/27/2022    IR TUNNELED DIALYSIS CATHETER PLACEMENT  08/30/2023    OR AMPUTATION METATARSAL W/TOE SINGLE Right 8/31/2023    Procedure: RIGHT PARTIAL 1ST RAY RESECTION WITH  WOUND VAC APPLICATION;  Surgeon: Won Parmar DPM;  Location: [facility];  Service: Podiatry       FAMILY HISTORY:  History reviewed. No pertinent family history.    SOCIAL HISTORY:  Social History     Tobacco Use    Smoking status: Never     Passive exposure: Never    Smokeless tobacco: Never   Vaping Use    Vaping status: Never Used    Substance Use Topics    Alcohol use: Not Currently     Alcohol/week: 0.0 standard drinks of alcohol     Comment: 0    Drug use: Never       MEDICATIONS:    Current Outpatient Medications:     acetaminophen (TYLENOL) 325 mg tablet, Take 2 tablets (650 mg total) by mouth every 6 (six) hours as needed for mild pain, headaches or fever, Disp: , Rfl:     allopurinol (ZYLOPRIM) 100 mg tablet, Take 1 tablet (100 mg total) by mouth daily, Disp: 30 tablet, Rfl: 0    docusate sodium (COLACE) 100 mg capsule, Take 1 capsule (100 mg total) by mouth 2 (two) times a day, Disp: 60 capsule, Rfl: 0    mupirocin (BACTROBAN) 2 % ointment, Apply topically 3 (three) times a day, Disp: , Rfl:     naloxone (NARCAN) 4 mg/0.1 mL nasal spray, Administer 1 spray into a nostril. If no response after 2-3 minutes, give another dose in the other nostril using a new spray., Disp: 1 each, Rfl: 0    NIFEdipine (PROCARDIA XL) 30 mg 24 hr tablet, Take 1 tablet (30 mg total) by mouth daily after dinner, Disp: , Rfl:     polyethylene glycol (MIRALAX) 17 g packet, Take 17 g by mouth daily Do not start before September 7, 2023., Disp: , Rfl: 0    sevelamer (RENAGEL) 800 mg tablet, Take 1 tablet (800 mg total) by mouth 3 (three) times a day with meals, Disp: , Rfl: 0    atorvastatin (LIPITOR) 80 mg tablet, Take 80 mg by mouth daily, Disp: , Rfl:     enoxaparin (LOVENOX) 30 mg/0.3 mL, Inject 0.3 mL (30 mg total) under the skin every 24 hours, Disp: , Rfl:     gabapentin (NEURONTIN) 100 mg capsule, Take 2 capsules (200 mg total) by mouth 2 (two) times a day, Disp: 120 capsule, Rfl: 0    ALLERGIES:  No Known Allergies    REVIEW OF SYSTEMS:  MSK: As per Ortho exam  Neuro: Intact  Pertinent items are otherwise noted in HPI.  A comprehensive review of systems was otherwise negative.    LABS:  HgA1c:   Lab Results   Component Value Date    HGBA1C 6.2 (H) 07/02/2024     BMP:   Lab Results   Component Value Date    CALCIUM 8.5 07/06/2024    K 4.8 07/06/2024    CO2  "26 07/06/2024    CL 93 (L) 07/06/2024    BUN 44 (H) 07/06/2024    CREATININE 9.00 (H) 07/06/2024     CBC: No components found for: \"CBC\"    _____________________________________________________  PHYSICAL EXAMINATION:  Vital signs: Ht 6' 1\" (1.854 m)   Wt 108 kg (237 lb 4.1 oz)   BMI 31.30 kg/m²   General: No acute distress, awake and alert  Psychiatric: Mood and affect appear appropriate  HEENT: Trachea Midline, No torticollis, no apparent facial trauma  Cardiovascular: No audible murmurs; Extremities appear perfused  Pulmonary: No audible wheezing or stridor  Skin: No open lesions; see further details (if any) below    MUSCULOSKELETAL EXAMINATION:  The right lower extremity was exposed and inspected. Visible skin intact without erythema, ecchymosis, effusion or obvious osseous deformity.  Mild TTP right SI joint no pain with lateral compression of the pelvis.  No pain with range of motion of the right hip able to stand up on his own power. Sensation intact to superficial peroneal, deep peroneal, sural, saphenous, plantar nerve distributions. Motor intact to extensor hallux longus, tibialis anterior, gastrocnemius muscles, extensor mechanism intact. Limb is well perfused. Brisk capillary refill in all 5 digits. Compartments soft and compressible.  Observable deformity of the hip and lower extremity that rests in external rotation.      _____________________________________________________  STUDIES REVIEWED:  I personally reviewed the images and interpretation is as follows:    X-rays of the pelvis demonstrates further consolidation of the right sacral fractures when compared to prior films.  Pubic rami fracture is healing.  No rotational malalignment    PROCEDURES PERFORMED:  Procedures    Scribe Attestation      I,:   am acting as a scribe while in the presence of the attending physician.:       I,:   personally performed the services described in this documentation    as scribed in my presence.:             "

## 2024-10-08 ENCOUNTER — APPOINTMENT (OUTPATIENT)
Dept: RADIOLOGY | Facility: HOSPITAL | Age: 64
DRG: 640 | End: 2024-10-08
Payer: MEDICARE

## 2024-10-08 ENCOUNTER — APPOINTMENT (EMERGENCY)
Dept: RADIOLOGY | Facility: HOSPITAL | Age: 64
DRG: 640 | End: 2024-10-08
Payer: MEDICARE

## 2024-10-08 ENCOUNTER — APPOINTMENT (OUTPATIENT)
Dept: DIALYSIS | Facility: HOSPITAL | Age: 64
DRG: 640 | End: 2024-10-08
Payer: MEDICARE

## 2024-10-08 ENCOUNTER — HOSPITAL ENCOUNTER (INPATIENT)
Facility: HOSPITAL | Age: 64
LOS: 13 days | Discharge: HOME | DRG: 640 | End: 2024-10-22
Attending: EMERGENCY MEDICINE | Admitting: INTERNAL MEDICINE
Payer: MEDICARE

## 2024-10-08 DIAGNOSIS — I10 ASYMPTOMATIC HYPERTENSION: ICD-10-CM

## 2024-10-08 DIAGNOSIS — F41.9 ANXIETY: ICD-10-CM

## 2024-10-08 DIAGNOSIS — R31.9 HEMATURIA: ICD-10-CM

## 2024-10-08 DIAGNOSIS — Z99.2 END-STAGE RENAL DISEASE NEEDING DIALYSIS (HCC): ICD-10-CM

## 2024-10-08 DIAGNOSIS — S80.219A KNEE ABRASION: ICD-10-CM

## 2024-10-08 DIAGNOSIS — E87.5 HYPERKALEMIA: ICD-10-CM

## 2024-10-08 DIAGNOSIS — R11.0 NAUSEA: ICD-10-CM

## 2024-10-08 DIAGNOSIS — N18.6 END-STAGE RENAL DISEASE NEEDING DIALYSIS (HCC): ICD-10-CM
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
## 2024-10-08 DIAGNOSIS — R29.90 STROKE-LIKE SYMPTOMS: ICD-10-CM

## 2024-10-08 DIAGNOSIS — Z00.8 ENCOUNTER FOR ASSESSMENT OF DECISION-MAKING CAPACITY: ICD-10-CM

## 2024-10-08 PROBLEM — I73.9 PVD (PERIPHERAL VASCULAR DISEASE) (HCC): Status: ACTIVE | Noted: 2024-10-08

## 2024-10-08 PROBLEM — R29.6 FALLS: Status: ACTIVE | Noted: 2024-10-08

## 2024-10-08 PROBLEM — I16.0 HYPERTENSIVE URGENCY: Status: ACTIVE | Noted: 2024-03-12

## 2024-10-08 LAB
ALBUMIN SERPL BCG-MCNC: 4 G/DL (ref 3.5–5)
ALBUMIN SERPL BCG-MCNC: 4.2 G/DL (ref 3.5–5)
ALP SERPL-CCNC: 100 U/L (ref 34–104)
ALP SERPL-CCNC: 95 U/L (ref 34–104)
ALT SERPL W P-5'-P-CCNC: 37 U/L (ref 7–52)
ALT SERPL W P-5'-P-CCNC: 39 U/L (ref 7–52)
AMPHETAMINES SERPL QL SCN: NEGATIVE
ANION GAP SERPL CALCULATED.3IONS-SCNC: 12 MMOL/L (ref 4–13)
ANION GAP SERPL CALCULATED.3IONS-SCNC: 14 MMOL/L (ref 4–13)
ANION GAP SERPL CALCULATED.3IONS-SCNC: 16 MMOL/L (ref 4–13)
AST SERPL W P-5'-P-CCNC: 24 U/L (ref 13–39)
AST SERPL W P-5'-P-CCNC: 25 U/L (ref 13–39)
ATRIAL RATE: 75 BPM
BARBITURATES UR QL: NEGATIVE
BASOPHILS # BLD AUTO: 0.04 THOUSANDS/ΜL (ref 0–0.1)
BASOPHILS # BLD AUTO: 0.04 THOUSANDS/ΜL (ref 0–0.1)
BASOPHILS NFR BLD AUTO: 0 % (ref 0–1)
BASOPHILS NFR BLD AUTO: 1 % (ref 0–1)
BENZODIAZ UR QL: NEGATIVE
BILIRUB SERPL-MCNC: 0.45 MG/DL (ref 0.2–1)
BILIRUB SERPL-MCNC: 0.52 MG/DL (ref 0.2–1)
BUN SERPL-MCNC: 35 MG/DL (ref 5–25)
BUN SERPL-MCNC: 94 MG/DL (ref 5–25)
BUN SERPL-MCNC: 95 MG/DL (ref 5–25)
CALCIUM SERPL-MCNC: 8 MG/DL (ref 8.4–10.2)
CALCIUM SERPL-MCNC: 8.1 MG/DL (ref 8.4–10.2)
CALCIUM SERPL-MCNC: 8.3 MG/DL (ref 8.4–10.2)
CARDIAC TROPONIN I PNL SERPL HS: 73 NG/L
CHLORIDE SERPL-SCNC: 105 MMOL/L (ref 96–108)
CHLORIDE SERPL-SCNC: 106 MMOL/L (ref 96–108)
CHLORIDE SERPL-SCNC: 98 MMOL/L (ref 96–108)
CO2 SERPL-SCNC: 16 MMOL/L (ref 21–32)
CO2 SERPL-SCNC: 19 MMOL/L (ref 21–32)
CO2 SERPL-SCNC: 22 MMOL/L (ref 21–32)
COCAINE UR QL: NEGATIVE
CREAT SERPL-MCNC: 11.08 MG/DL (ref 0.6–1.3)
CREAT SERPL-MCNC: 11.13 MG/DL (ref 0.6–1.3)
CREAT SERPL-MCNC: 5.2 MG/DL (ref 0.6–1.3)
EOSINOPHIL # BLD AUTO: 0.24 THOUSAND/ΜL (ref 0–0.61)
EOSINOPHIL # BLD AUTO: 0.31 THOUSAND/ΜL (ref 0–0.61)
EOSINOPHIL NFR BLD AUTO: 3 % (ref 0–6)
EOSINOPHIL NFR BLD AUTO: 3 % (ref 0–6)
ERYTHROCYTE [DISTWIDTH] IN BLOOD BY AUTOMATED COUNT: 16.8 % (ref 11.6–15.1)
ERYTHROCYTE [DISTWIDTH] IN BLOOD BY AUTOMATED COUNT: 16.9 % (ref 11.6–15.1)
EST. AVERAGE GLUCOSE BLD GHB EST-MCNC: 111 MG/DL
FENTANYL UR QL SCN: NEGATIVE
GFR SERPL CREATININE-BSD FRML MDRD: 10 ML/MIN/1.73SQ M
GFR SERPL CREATININE-BSD FRML MDRD: 4 ML/MIN/1.73SQ M
GFR SERPL CREATININE-BSD FRML MDRD: 4 ML/MIN/1.73SQ M
GLUCOSE SERPL-MCNC: 117 MG/DL (ref 65–140)
GLUCOSE SERPL-MCNC: 122 MG/DL (ref 65–140)
GLUCOSE SERPL-MCNC: 139 MG/DL (ref 65–140)
GLUCOSE SERPL-MCNC: 141 MG/DL (ref 65–140)
GLUCOSE SERPL-MCNC: 145 MG/DL (ref 65–140)
GLUCOSE SERPL-MCNC: 168 MG/DL (ref 65–140)
GLUCOSE SERPL-MCNC: 174 MG/DL (ref 65–140)
GLUCOSE SERPL-MCNC: 184 MG/DL (ref 65–140)
GLUCOSE SERPL-MCNC: 78 MG/DL (ref 65–140)
HBA1C MFR BLD: 5.5 %
HBV CORE AB SER QL: NORMAL
HBV CORE IGM SER QL: NORMAL
HBV SURFACE AB SER-ACNC: 42.5 MIU/ML
HBV SURFACE AG SER QL: NORMAL
HCT VFR BLD AUTO: 31.6 % (ref 36.5–49.3)
HCT VFR BLD AUTO: 32.3 % (ref 36.5–49.3)
HCV AB SER QL: NORMAL
HGB BLD-MCNC: 10.2 G/DL (ref 12–17)
HGB BLD-MCNC: 9.8 G/DL (ref 12–17)
HYDROCODONE UR QL SCN: NEGATIVE
IMM GRANULOCYTES # BLD AUTO: 0.02 THOUSAND/UL (ref 0–0.2)
IMM GRANULOCYTES # BLD AUTO: 0.03 THOUSAND/UL (ref 0–0.2)
IMM GRANULOCYTES NFR BLD AUTO: 0 % (ref 0–2)
IMM GRANULOCYTES NFR BLD AUTO: 0 % (ref 0–2)
LYMPHOCYTES # BLD AUTO: 0.85 THOUSANDS/ΜL (ref 0.6–4.47)
LYMPHOCYTES # BLD AUTO: 1.45 THOUSANDS/ΜL (ref 0.6–4.47)
LYMPHOCYTES NFR BLD AUTO: 12 % (ref 14–44)
LYMPHOCYTES NFR BLD AUTO: 15 % (ref 14–44)
MAGNESIUM SERPL-MCNC: 2.3 MG/DL (ref 1.9–2.7)
MCH RBC QN AUTO: 29.2 PG (ref 26.8–34.3)
MCH RBC QN AUTO: 29.7 PG (ref 26.8–34.3)
MCHC RBC AUTO-ENTMCNC: 31 G/DL (ref 31.4–37.4)
MCHC RBC AUTO-ENTMCNC: 31.6 G/DL (ref 31.4–37.4)
MCV RBC AUTO: 94 FL (ref 82–98)
MCV RBC AUTO: 94 FL (ref 82–98)
METHADONE UR QL: NEGATIVE
MONOCYTES # BLD AUTO: 0.59 THOUSAND/ΜL (ref 0.17–1.22)
MONOCYTES # BLD AUTO: 0.93 THOUSAND/ΜL (ref 0.17–1.22)
MONOCYTES NFR BLD AUTO: 10 % (ref 4–12)
MONOCYTES NFR BLD AUTO: 8 % (ref 4–12)
NEUTROPHILS # BLD AUTO: 5.61 THOUSANDS/ΜL (ref 1.85–7.62)
NEUTROPHILS # BLD AUTO: 6.88 THOUSANDS/ΜL (ref 1.85–7.62)
NEUTS SEG NFR BLD AUTO: 72 % (ref 43–75)
NEUTS SEG NFR BLD AUTO: 76 % (ref 43–75)
NRBC BLD AUTO-RTO: 0 /100 WBCS
NRBC BLD AUTO-RTO: 0 /100 WBCS
OPIATES UR QL SCN: NEGATIVE
OXYCODONE+OXYMORPHONE UR QL SCN: POSITIVE
P AXIS: 69 DEGREES
PCP UR QL: NEGATIVE
PHOSPHATE SERPL-MCNC: 7.7 MG/DL (ref 2.3–4.1)
PLATELET # BLD AUTO: 192 THOUSANDS/UL (ref 149–390)
PLATELET # BLD AUTO: 206 THOUSANDS/UL (ref 149–390)
PMV BLD AUTO: 9.4 FL (ref 8.9–12.7)
PMV BLD AUTO: 9.7 FL (ref 8.9–12.7)
POTASSIUM SERPL-SCNC: 4.1 MMOL/L (ref 3.5–5.3)
POTASSIUM SERPL-SCNC: 5.8 MMOL/L (ref 3.5–5.3)
POTASSIUM SERPL-SCNC: 5.8 MMOL/L (ref 3.5–5.3)
PR INTERVAL: 184 MS
PROT SERPL-MCNC: 7.1 G/DL (ref 6.4–8.4)
PROT SERPL-MCNC: 7.5 G/DL (ref 6.4–8.4)
QRS AXIS: 20 DEGREES
QRSD INTERVAL: 74 MS
QT INTERVAL: 398 MS
QTC INTERVAL: 444 MS
RBC # BLD AUTO: 3.36 MILLION/UL (ref 3.88–5.62)
RBC # BLD AUTO: 3.44 MILLION/UL (ref 3.88–5.62)
SODIUM SERPL-SCNC: 132 MMOL/L (ref 135–147)
SODIUM SERPL-SCNC: 138 MMOL/L (ref 135–147)
SODIUM SERPL-SCNC: 138 MMOL/L (ref 135–147)
T WAVE AXIS: 66 DEGREES
THC UR QL: NEGATIVE
VENTRICULAR RATE: 75 BPM
WBC # BLD AUTO: 7.36 THOUSAND/UL (ref 4.31–10.16)
WBC # BLD AUTO: 9.63 THOUSAND/UL (ref 4.31–10.16)

## 2024-10-08 PROCEDURE — 86705 HEP B CORE ANTIBODY IGM: CPT | Performed by: INTERNAL MEDICINE

## 2024-10-08 PROCEDURE — 80307 DRUG TEST PRSMV CHEM ANLYZR: CPT

## 2024-10-08 PROCEDURE — 99223 1ST HOSP IP/OBS HIGH 75: CPT | Performed by: FAMILY MEDICINE

## 2024-10-08 PROCEDURE — 99214 OFFICE O/P EST MOD 30 MIN: CPT | Performed by: INTERNAL MEDICINE

## 2024-10-08 PROCEDURE — 70450 CT HEAD/BRAIN W/O DYE: CPT

## 2024-10-08 PROCEDURE — 70498 CT ANGIOGRAPHY NECK: CPT

## 2024-10-08 PROCEDURE — 99215 OFFICE O/P EST HI 40 MIN: CPT

## 2024-10-08 PROCEDURE — 82948 REAGENT STRIP/BLOOD GLUCOSE: CPT

## 2024-10-08 PROCEDURE — 83735 ASSAY OF MAGNESIUM: CPT | Performed by: EMERGENCY MEDICINE

## 2024-10-08 PROCEDURE — 83735 ASSAY OF MAGNESIUM: CPT

## 2024-10-08 PROCEDURE — 80053 COMPREHEN METABOLIC PANEL: CPT | Performed by: EMERGENCY MEDICINE

## 2024-10-08 PROCEDURE — 90471 IMMUNIZATION ADMIN: CPT

## 2024-10-08 PROCEDURE — G0257 UNSCHED DIALYSIS ESRD PT HOS: HCPCS

## 2024-10-08 PROCEDURE — 80048 BASIC METABOLIC PNL TOTAL CA: CPT

## 2024-10-08 PROCEDURE — 86803 HEPATITIS C AB TEST: CPT | Performed by: INTERNAL MEDICINE

## 2024-10-08 PROCEDURE — 36415 COLL VENOUS BLD VENIPUNCTURE: CPT | Performed by: EMERGENCY MEDICINE

## 2024-10-08 PROCEDURE — 85025 COMPLETE CBC W/AUTO DIFF WBC: CPT | Performed by: EMERGENCY MEDICINE

## 2024-10-08 PROCEDURE — 99284 EMERGENCY DEPT VISIT MOD MDM: CPT

## 2024-10-08 PROCEDURE — 84484 ASSAY OF TROPONIN QUANT: CPT

## 2024-10-08 PROCEDURE — 93010 ELECTROCARDIOGRAM REPORT: CPT | Performed by: INTERNAL MEDICINE

## 2024-10-08 PROCEDURE — 83036 HEMOGLOBIN GLYCOSYLATED A1C: CPT | Performed by: FAMILY MEDICINE

## 2024-10-08 PROCEDURE — 3E03317 INTRODUCTION OF OTHER THROMBOLYTIC INTO PERIPHERAL VEIN, PERCUTANEOUS APPROACH: ICD-10-PCS | Performed by: PSYCHIATRY & NEUROLOGY

## 2024-10-08 PROCEDURE — 72125 CT NECK SPINE W/O DYE: CPT

## 2024-10-08 PROCEDURE — 87081 CULTURE SCREEN ONLY: CPT

## 2024-10-08 PROCEDURE — 87340 HEPATITIS B SURFACE AG IA: CPT | Performed by: INTERNAL MEDICINE

## 2024-10-08 PROCEDURE — 80061 LIPID PANEL: CPT | Performed by: NURSE PRACTITIONER

## 2024-10-08 PROCEDURE — 84100 ASSAY OF PHOSPHORUS: CPT | Performed by: EMERGENCY MEDICINE

## 2024-10-08 PROCEDURE — 5A1D70Z PERFORMANCE OF URINARY FILTRATION, INTERMITTENT, LESS THAN 6 HOURS PER DAY: ICD-10-PCS | Performed by: INTERNAL MEDICINE

## 2024-10-08 PROCEDURE — NC001 PR NO CHARGE: Performed by: INTERNAL MEDICINE

## 2024-10-08 PROCEDURE — 70496 CT ANGIOGRAPHY HEAD: CPT

## 2024-10-08 PROCEDURE — 90715 TDAP VACCINE 7 YRS/> IM: CPT | Performed by: EMERGENCY MEDICINE

## 2024-10-08 PROCEDURE — 73564 X-RAY EXAM KNEE 4 OR MORE: CPT

## 2024-10-08 PROCEDURE — NC001 PR NO CHARGE

## 2024-10-08 PROCEDURE — 86704 HEP B CORE ANTIBODY TOTAL: CPT | Performed by: INTERNAL MEDICINE

## 2024-10-08 PROCEDURE — 99285 EMERGENCY DEPT VISIT HI MDM: CPT | Performed by: EMERGENCY MEDICINE

## 2024-10-08 PROCEDURE — 86706 HEP B SURFACE ANTIBODY: CPT | Performed by: INTERNAL MEDICINE

## 2024-10-08 PROCEDURE — 93005 ELECTROCARDIOGRAM TRACING: CPT

## 2024-10-08 RX ORDER — ATORVASTATIN CALCIUM 40 MG/1
40 TABLET, FILM COATED ORAL EVERY EVENING
Status: DISCONTINUED | OUTPATIENT
Start: 2024-10-08 | End: 2024-10-08

## 2024-10-08 RX ORDER — ALBUTEROL SULFATE 0.83 MG/ML
5 SOLUTION RESPIRATORY (INHALATION) ONCE
Status: COMPLETED | OUTPATIENT
Start: 2024-10-08 | End: 2024-10-08

## 2024-10-08 RX ORDER — INSULIN LISPRO 100 [IU]/ML
1-5 INJECTION, SOLUTION INTRAVENOUS; SUBCUTANEOUS
Status: DISCONTINUED | OUTPATIENT
Start: 2024-10-08 | End: 2024-10-08

## 2024-10-08 RX ORDER — SEVELAMER HYDROCHLORIDE 800 MG/1
800 TABLET, FILM COATED ORAL
Status: DISCONTINUED | OUTPATIENT
Start: 2024-10-08 | End: 2024-10-08

## 2024-10-08 RX ORDER — NIFEDIPINE 30 MG/1
30 TABLET, EXTENDED RELEASE ORAL
Status: DISCONTINUED | OUTPATIENT
Start: 2024-10-08 | End: 2024-10-08

## 2024-10-08 RX ORDER — MUPIROCIN 20 MG/G
OINTMENT TOPICAL 3 TIMES DAILY
Status: DISCONTINUED | OUTPATIENT
Start: 2024-10-08 | End: 2024-10-09

## 2024-10-08 RX ORDER — GINSENG 100 MG
1 CAPSULE ORAL ONCE
Status: COMPLETED | OUTPATIENT
Start: 2024-10-08 | End: 2024-10-08

## 2024-10-08 RX ORDER — ATORVASTATIN CALCIUM 40 MG/1
80 TABLET, FILM COATED ORAL
Status: DISCONTINUED | OUTPATIENT
Start: 2024-10-08 | End: 2024-10-09

## 2024-10-08 RX ORDER — DOCUSATE SODIUM 100 MG/1
100 CAPSULE, LIQUID FILLED ORAL 2 TIMES DAILY
Status: DISCONTINUED | OUTPATIENT
Start: 2024-10-08 | End: 2024-10-22 | Stop reason: HOSPADM

## 2024-10-08 RX ORDER — ALLOPURINOL 100 MG/1
100 TABLET ORAL DAILY
Status: DISCONTINUED | OUTPATIENT
Start: 2024-10-08 | End: 2024-10-22 | Stop reason: HOSPADM

## 2024-10-08 RX ORDER — CALCIUM GLUCONATE 20 MG/ML
1 INJECTION, SOLUTION INTRAVENOUS ONCE
Status: COMPLETED | OUTPATIENT
Start: 2024-10-08 | End: 2024-10-08

## 2024-10-08 RX ORDER — ACETAMINOPHEN 325 MG/1
650 TABLET ORAL EVERY 6 HOURS PRN
Status: DISCONTINUED | OUTPATIENT
Start: 2024-10-08 | End: 2024-10-08

## 2024-10-08 RX ORDER — INSULIN LISPRO 100 [IU]/ML
1-6 INJECTION, SOLUTION INTRAVENOUS; SUBCUTANEOUS EVERY 6 HOURS SCHEDULED
Status: DISCONTINUED | OUTPATIENT
Start: 2024-10-08 | End: 2024-10-09

## 2024-10-08 RX ORDER — CHLORHEXIDINE GLUCONATE ORAL RINSE 1.2 MG/ML
15 SOLUTION DENTAL EVERY 12 HOURS SCHEDULED
Status: DISCONTINUED | OUTPATIENT
Start: 2024-10-08 | End: 2024-10-10

## 2024-10-08 RX ORDER — HEPARIN SODIUM 5000 [USP'U]/ML
5000 INJECTION, SOLUTION INTRAVENOUS; SUBCUTANEOUS EVERY 8 HOURS SCHEDULED
Status: CANCELLED | OUTPATIENT
Start: 2024-10-08

## 2024-10-08 RX ORDER — ACETAMINOPHEN 325 MG/1
975 TABLET ORAL ONCE
Status: COMPLETED | OUTPATIENT
Start: 2024-10-08 | End: 2024-10-08

## 2024-10-08 RX ORDER — NIFEDIPINE 30 MG/1
30 TABLET, EXTENDED RELEASE ORAL DAILY
Status: DISCONTINUED | OUTPATIENT
Start: 2024-10-08 | End: 2024-10-08

## 2024-10-08 RX ORDER — FUROSEMIDE 10 MG/ML
80 INJECTION INTRAMUSCULAR; INTRAVENOUS ONCE
Status: COMPLETED | OUTPATIENT
Start: 2024-10-08 | End: 2024-10-08

## 2024-10-08 RX ORDER — POLYETHYLENE GLYCOL 3350 17 G/17G
17 POWDER, FOR SOLUTION ORAL DAILY
Status: DISCONTINUED | OUTPATIENT
Start: 2024-10-08 | End: 2024-10-08

## 2024-10-08 RX ORDER — GABAPENTIN 100 MG/1
200 CAPSULE ORAL 2 TIMES DAILY
Status: DISCONTINUED | OUTPATIENT
Start: 2024-10-08 | End: 2024-10-08

## 2024-10-08 RX ORDER — ACETAMINOPHEN 10 MG/ML
1000 INJECTION, SOLUTION INTRAVENOUS EVERY 6 HOURS PRN
Status: DISCONTINUED | OUTPATIENT
Start: 2024-10-08 | End: 2024-10-21

## 2024-10-08 RX ORDER — LABETALOL HYDROCHLORIDE 5 MG/ML
10 INJECTION, SOLUTION INTRAVENOUS EVERY 4 HOURS PRN
Status: DISCONTINUED | OUTPATIENT
Start: 2024-10-08 | End: 2024-10-21

## 2024-10-08 RX ORDER — POLYETHYLENE GLYCOL 3350 17 G/17G
17 POWDER, FOR SOLUTION ORAL DAILY PRN
Status: DISCONTINUED | OUTPATIENT
Start: 2024-10-08 | End: 2024-10-22 | Stop reason: HOSPADM

## 2024-10-08 RX ORDER — DEXTROSE 10 % IN WATER 10 %
250 INTRAVENOUS SOLUTION INTRAVENOUS ONCE
Status: COMPLETED | OUTPATIENT
Start: 2024-10-08 | End: 2024-10-08

## 2024-10-08 RX ORDER — ACETAMINOPHEN 10 MG/ML
1000 INJECTION, SOLUTION INTRAVENOUS ONCE
Status: COMPLETED | OUTPATIENT
Start: 2024-10-08 | End: 2024-10-08

## 2024-10-08 RX ORDER — HYDRALAZINE HYDROCHLORIDE 20 MG/ML
10 INJECTION INTRAMUSCULAR; INTRAVENOUS ONCE
Status: COMPLETED | OUTPATIENT
Start: 2024-10-08 | End: 2024-10-08

## 2024-10-08 RX ORDER — OXYCODONE HYDROCHLORIDE 5 MG/1
5 TABLET ORAL EVERY 6 HOURS PRN
Status: DISCONTINUED | OUTPATIENT
Start: 2024-10-08 | End: 2024-10-08

## 2024-10-08 RX ADMIN — FUROSEMIDE 80 MG: 10 INJECTION, SOLUTION INTRAMUSCULAR; INTRAVENOUS at 05:56

## 2024-10-08 RX ADMIN — DEXTROSE 250 ML: 10 SOLUTION INTRAVENOUS at 06:01

## 2024-10-08 RX ADMIN — INSULIN HUMAN 10 UNITS: 100 INJECTION, SOLUTION PARENTERAL at 06:05

## 2024-10-08 RX ADMIN — ALBUTEROL SULFATE 5 MG: 2.5 SOLUTION RESPIRATORY (INHALATION) at 06:11

## 2024-10-08 RX ADMIN — ACETAMINOPHEN 1000 MG: 10 INJECTION INTRAVENOUS at 20:02

## 2024-10-08 RX ADMIN — TETANUS TOXOID, REDUCED DIPHTHERIA TOXOID AND ACELLULAR PERTUSSIS VACCINE, ADSORBED 0.5 ML: 5; 2.5; 8; 8; 2.5 SUSPENSION INTRAMUSCULAR at 02:14

## 2024-10-08 RX ADMIN — IOHEXOL 85 ML: 350 INJECTION, SOLUTION INTRAVENOUS at 13:44

## 2024-10-08 RX ADMIN — ACETAMINOPHEN 975 MG: 325 TABLET ORAL at 02:17

## 2024-10-08 RX ADMIN — MUPIROCIN: 20 OINTMENT TOPICAL at 20:34

## 2024-10-08 RX ADMIN — ACETAMINOPHEN 1000 MG: 10 INJECTION INTRAVENOUS at 15:37

## 2024-10-08 RX ADMIN — CHLORHEXIDINE GLUCONATE 15 ML: 1.2 RINSE ORAL at 20:36

## 2024-10-08 RX ADMIN — LABETALOL HYDROCHLORIDE 10 MG: 5 INJECTION, SOLUTION INTRAVENOUS at 18:15

## 2024-10-08 RX ADMIN — CALCIUM GLUCONATE 1 G: 20 INJECTION, SOLUTION INTRAVENOUS at 06:21

## 2024-10-08 RX ADMIN — OXYCODONE HYDROCHLORIDE 5 MG: 5 TABLET ORAL at 07:39

## 2024-10-08 RX ADMIN — INSULIN LISPRO 1 UNITS: 100 INJECTION, SOLUTION INTRAVENOUS; SUBCUTANEOUS at 14:30

## 2024-10-08 RX ADMIN — TENECTEPLASE 25 MG: KIT at 14:05

## 2024-10-08 RX ADMIN — MUPIROCIN 1 APPLICATION: 20 OINTMENT TOPICAL at 16:00

## 2024-10-08 RX ADMIN — BACITRACIN ZINC 1 LARGE APPLICATION: 500 OINTMENT TOPICAL at 02:16

## 2024-10-08 RX ADMIN — HYDRALAZINE HYDROCHLORIDE 10 MG: 20 INJECTION INTRAMUSCULAR; INTRAVENOUS at 06:17

## 2024-10-08 NOTE — PLAN OF CARE
Problem: Potential for Falls  Goal: Patient will remain free of falls  Description: INTERVENTIONS:  - Educate patient/family on patient safety including physical limitations  - Instruct patient to call for assistance with activity   - Consult OT/PT to assist with strengthening/mobility   - Keep Call bell within reach  - Keep bed low and locked with side rails adjusted as appropriate  - Keep care items and personal belongings within reach  - Initiate and maintain comfort rounds  - Make Fall Risk Sign visible to staff  - Offer Toileting every 2 Hours, in advance of need  - Initiate/Maintain bed alarm  - Obtain necessary fall risk management equipment: fall risk sign on door  - Apply yellow socks and bracelet for high fall risk patients  - Consider moving patient to room near nurses station  Outcome: Progressing     Problem: METABOLIC, FLUID AND ELECTROLYTES - ADULT  Goal: Electrolytes maintained within normal limits  Description: INTERVENTIONS:  - Monitor labs and assess patient for signs and symptoms of electrolyte imbalances  - Administer electrolyte replacement as ordered  - Monitor response to electrolyte replacements, including repeat lab results as appropriate  - Instruct patient on fluid and nutrition as appropriate  Outcome: Progressing  Goal: Fluid balance maintained  Description: INTERVENTIONS:  - Monitor labs   - Monitor I/O and WT  - Instruct patient on fluid and nutrition as appropriate  - Assess for signs & symptoms of volume excess or deficit  Outcome: Progressing     Problem: Prexisting or High Potential for Compromised Skin Integrity  Goal: Skin integrity is maintained or improved  Description: INTERVENTIONS:  - Identify patients at risk for skin breakdown  - Assess and monitor skin integrity  - Assess and monitor nutrition and hydration status  - Monitor labs   - Assess for incontinence   - Turn and reposition patient  - Assist with mobility/ambulation  - Relieve pressure over bony prominences  -  Avoid friction and shearing  - Provide appropriate hygiene as needed including keeping skin clean and dry  - Evaluate need for skin moisturizer/barrier cream  - Collaborate with interdisciplinary team   - Patient/family teaching  - Consider wound care consult   Outcome: Progressing     Problem: PAIN - ADULT  Goal: Verbalizes/displays adequate comfort level or baseline comfort level  Description: Interventions:  - Encourage patient to monitor pain and request assistance  - Assess pain using appropriate pain scale  - Administer analgesics based on type and severity of pain and evaluate response  - Implement non-pharmacological measures as appropriate and evaluate response  - Consider cultural and social influences on pain and pain management  - Notify physician/advanced practitioner if interventions unsuccessful or patient reports new pain  Outcome: Progressing     Problem: INFECTION - ADULT  Goal: Absence or prevention of progression during hospitalization  Description: INTERVENTIONS:  - Assess and monitor for signs and symptoms of infection  - Monitor lab/diagnostic results  - Monitor all insertion sites, i.e. indwelling lines, tubes, and drains  - Monitor endotracheal if appropriate and nasal secretions for changes in amount and color  - [facility] appropriate cooling/warming therapies per order  - Administer medications as ordered  - Instruct and encourage patient and family to use good hand hygiene technique  - Identify and instruct in appropriate isolation precautions for identified infection/condition  Outcome: Progressing  Goal: Absence of fever/infection during neutropenic period  Description: INTERVENTIONS:  - Monitor WBC    Outcome: Progressing     Problem: SAFETY ADULT  Goal: Patient will remain free of falls  Description: INTERVENTIONS:  - Educate patient/family on patient safety including physical limitations  - Instruct patient to call for assistance with activity   - Consult OT/PT to assist with  strengthening/mobility   - Keep Call bell within reach  - Keep bed low and locked with side rails adjusted as appropriate  - Keep care items and personal belongings within reach  - Initiate and maintain comfort rounds  - Make Fall Risk Sign visible to staff  - Offer Toileting every 2 Hours, in advance of need  - Initiate/Maintain bed alarm  - Obtain necessary fall risk management equipment: fall risk sign on door  - Apply yellow socks and bracelet for high fall risk patients  - Consider moving patient to room near nurses station  Outcome: Progressing  Goal: Maintain or return to baseline ADL function  Description: INTERVENTIONS:  -  Assess patient's ability to carry out ADLs; assess patient's baseline for ADL function and identify physical deficits which impact ability to perform ADLs (bathing, care of mouth/teeth, toileting, grooming, dressing, etc.)  - Assess/evaluate cause of self-care deficits   - Assess range of motion  - Assess patient's mobility; develop plan if impaired  - Assess patient's need for assistive devices and provide as appropriate  - Encourage maximum independence but intervene and supervise when necessary  - Involve family in performance of ADLs  - Assess for home care needs following discharge   - Consider OT consult to assist with ADL evaluation and planning for discharge  - Provide patient education as appropriate  Outcome: Progressing  Goal: Maintains/Returns to pre admission functional level  Description: INTERVENTIONS:  - Perform AM-PAC 6 Click Basic Mobility/ Daily Activity assessment daily.  - Set and communicate daily mobility goal to care team and patient/family/caregiver.   - Collaborate with rehabilitation services on mobility goals if consulted  - Perform Range of Motion 2 times a day.  - Reposition patient every 2 hours.  - Dangle patient 2 times a day  - Stand patient 2 times a day  - Ambulate patient 3 times a day  - Out of bed to chair 3 times a day   - Out of bed for meals 3  times a day  - Out of bed for toileting  - Record patient progress and toleration of activity level   Outcome: Progressing     Problem: DISCHARGE PLANNING  Goal: Discharge to home or other facility with appropriate resources  Description: INTERVENTIONS:  - Identify barriers to discharge w/patient and caregiver  - Arrange for needed discharge resources and transportation as appropriate  - Identify discharge learning needs (meds, wound care, etc.)  - Arrange for interpretive services to assist at discharge as needed  - Refer to Case Management Department for coordinating discharge planning if the patient needs post-hospital services based on physician/advanced practitioner order or complex needs related to functional status, cognitive ability, or social support system  Outcome: Progressing     Problem: Knowledge Deficit  Goal: Patient/family/caregiver demonstrates understanding of disease process, treatment plan, medications, and discharge instructions  Description: Complete learning assessment and assess knowledge base.  Interventions:  - Provide teaching at level of understanding  - Provide teaching via preferred learning methods  Outcome: Progressing     Problem: Nutrition/Hydration-ADULT  Goal: Nutrient/Hydration intake appropriate for improving, restoring or maintaining nutritional needs  Description: Monitor and assess patient's nutrition/hydration status for malnutrition. Collaborate with interdisciplinary team and initiate plan and interventions as ordered.  Monitor patient's weight and dietary intake as ordered or per policy. Utilize nutrition screening tool and intervene as necessary. Determine patient's food preferences and provide high-protein, high-caloric foods as appropriate.     INTERVENTIONS:  - Monitor oral intake, urinary output, labs, and treatment plans  - Assess nutrition and hydration status and recommend course of action  - Evaluate amount of meals eaten  - Assist patient with eating if  necessary   - Allow adequate time for meals  - Recommend/ encourage appropriate diets, oral nutritional supplements, and vitamin/mineral supplements  - Order, calculate, and assess calorie counts as needed  - Recommend, monitor, and adjust tube feedings and TPN/PPN based on assessed needs  - Assess need for intravenous fluids  - Provide specific nutrition/hydration education as appropriate  - Include patient/family/caregiver in decisions related to nutrition  Outcome: Progressing     Problem: CARDIOVASCULAR - ADULT  Goal: Maintains optimal cardiac output and hemodynamic stability  Description: INTERVENTIONS:  - Monitor I/O, vital signs and rhythm  - Monitor for S/S and trends of decreased cardiac output  - Administer and titrate ordered vasoactive medications to optimize hemodynamic stability  - Assess quality of pulses, skin color and temperature  - Assess for signs of decreased coronary artery perfusion  - Instruct patient to report change in severity of symptoms  Outcome: Progressing  Goal: Absence of cardiac dysrhythmias or at baseline rhythm  Description: INTERVENTIONS:  - Continuous cardiac monitoring, vital signs, obtain 12 lead EKG if ordered  - Administer antiarrhythmic and heart rate control medications as ordered  - Monitor electrolytes and administer replacement therapy as ordered  Outcome: Progressing     Problem: RESPIRATORY - ADULT  Goal: Achieves optimal ventilation and oxygenation  Description: INTERVENTIONS:  - Assess for changes in respiratory status  - Assess for changes in mentation and behavior  - Position to facilitate oxygenation and minimize respiratory effort  - Oxygen administered by appropriate delivery if ordered  - Initiate smoking cessation education as indicated  - Encourage broncho-pulmonary hygiene including cough, deep breathe, Incentive Spirometry  - Assess the need for suctioning and aspirate as needed  - Assess and instruct to report SOB or any respiratory difficulty  -  Respiratory Therapy support as indicated  Outcome: Progressing     Problem: HEMATOLOGIC - ADULT  Goal: Maintains hematologic stability  Description: INTERVENTIONS  - Assess for signs and symptoms of bleeding or hemorrhage  - Monitor labs  - Administer supportive blood products/factors as ordered and appropriate  Outcome: Progressing     Problem: MUSCULOSKELETAL - ADULT  Goal: Maintain or return mobility to safest level of function  Description: INTERVENTIONS:  - Assess patient's ability to carry out ADLs; assess patient's baseline for ADL function and identify physical deficits which impact ability to perform ADLs (bathing, care of mouth/teeth, toileting, grooming, dressing, etc.)  - Assess/evaluate cause of self-care deficits   - Assess range of motion  - Assess patient's mobility  - Assess patient's need for assistive devices and provide as appropriate  - Encourage maximum independence but intervene and supervise when necessary  - Involve family in performance of ADLs  - Assess for home care needs following discharge   - Consider OT consult to assist with ADL evaluation and planning for discharge  - Provide patient education as appropriate  Outcome: Progressing  Goal: Maintain proper alignment of affected body part  Description: INTERVENTIONS:  - Support, maintain and protect limb and body alignment  - Provide patient/ family with appropriate education  Outcome: Progressing

## 2024-10-08 NOTE — PLAN OF CARE
Problem: METABOLIC, FLUID AND ELECTROLYTES - ADULT  Goal: Electrolytes maintained within normal limits  Description: INTERVENTIONS:  - Monitor labs and assess patient for signs and symptoms of electrolyte imbalances  - Administer electrolyte replacement as ordered  - Monitor response to electrolyte replacements, including repeat lab results as appropriate  - Instruct patient on fluid and nutrition as appropriate  Outcome: Progressing  Goal: Fluid balance maintained  Description: INTERVENTIONS:  - Monitor labs   - Monitor I/O and WT  - Instruct patient on fluid and nutrition as appropriate  - Assess for signs & symptoms of volume excess or deficit  Outcome: Progressing   Target UF Goal 4 L as tolerated. Patient dialyzing for 4 hours on 2 K bath for serum K of  5.8  per protocol. Treatment plan reviewed with Dr. Linton.   Patient appears alert and oriented, conversant. Having involuntary twitching movement of body and all extremities. Accidentally spilled urinal to himself. Chlorhexidine bath rendered and linen changed before hemodialysis treatment.    Post-Dialysis RN Treatment Note    Blood Pressure:  Pre 125/86 mm/Hg  Post 129/72 mmHg   EDW  108 kg    Weight:  Pre 105.7 kg   Post 101.8 kg   Mode of weight measurement: Bed Scale   Volume Removed  3700 ml    Treatment duration 240 minutes    NS given  No    Treatment shortened? No   Medications given during Rx None Reported   Estimated Kt/V  None Reported   Access type: Permacath/TDC   Access Issues: No    Report called to primary nurse   Yes Enriqueta Garza RN

## 2024-10-08 NOTE — ASSESSMENT & PLAN NOTE
HD on TTS  S/p iHD on 10/8  Nephrology consulted and following      Left message for patient that labs have been entered for patient and faxed to [facility]. Patient is to schedule a lab appointment at [facility].

## 2024-10-08 NOTE — H&P
H&P - Hospitalist   Name: Naresh Carpio 64 y.o. male I MRN: [id number]  Unit/Bed#: ED 08 I Date of Admission: 10/8/2024   Date of Service: 10/8/2024 I Hospital Day: 0     Assessment & Plan  ESRD (end stage renal disease) ([facility])  Lab Results   Component Value Date    EGFR 4 10/08/2024    EGFR 4 10/08/2024    EGFR 5 07/06/2024    CREATININE 11.08 (H) 10/08/2024    CREATININE 11.13 (H) 10/08/2024    CREATININE 9.00 (H) 07/06/2024     Patient gets dialysis Tuesday, Thursday, Saturday and has missed dialysis for about a week since being discharged from short-term rehab.  Patient scheduled for dialysis today at a different facility but family stated that they cannot transport him for his appointments  Creatinine 11 today.  Continue Renagel  Consult nephrology for dialysis  Hyperkalemia  Potassium level 5.8 from missing dialysis session  Calcium gluconate, D10, regular insulin, albuterol nebulizer treatment and 80 mg of IV Lasix ordered by ER  Repeat lab work later today  Hypertensive urgency  Initial blood pressure 217/98.  Patient noncompliant with his medications at home  Will give one-time dose of IV hydralazine and restart Procardia XL and monitor blood pressures  Type 2 diabetes mellitus, without long-term current use of insulin ([facility])  Lab Results   Component Value Date    HGBA1C 6.2 (H) 07/02/2024       Recent Labs     10/08/24  0145   POCGLU 174*     Diet controlled.  While here will place patient on Accu-Cheks with SSI  Falls  Patient reports fall previously and then again today when he slipped on a wet floor on his way to the bathroom.  Patient then had a  fall when walking out of the ER where he suddenly had tremors in his legs while waling  Recently left rehab about a week ago per patient  CT head, CT cervical spine negative for acute pathology  Right knee x-ray without any fractures per ER but official read is pending  PT OT while here  Anemia  Hemoglobin appears stable for patient  Repeat lab work in  a.m.  Difficulty with speech  Patient with some intermittent difficulty with speech and intermittent tremors which was noted on prior admission in March as well  CT head negative for acute pathology as noted above      VTE Pharmacologic Prophylaxis:    Heparin  Code Status: Full code  Discussion with family: Patient declined call to .     Anticipated Length of Stay: Patient will be admitted on an observation basis with an anticipated length of stay of less than 2 midnights secondary to end-stage renal disease, hyperkalemia requiring dialysis.    History of Present Illness   Chief Complaint: Dilcia Carpio is a 64 y.o. male with a PMH of end-stage renal disease who presents with fall at home.  Patient states he was on his way to the bathroom and floor was wet and he slipped and fell and then started to have right knee pain.  Patient was admitted in July after a fall and noted to have bilateral S1 sacral fractures and right pelvic fracture and discharged to rehab.  Patient states he got out of rehab about 1 week ago and was supposed to be using a walker for ambulation but today he was not using it.  Patient also reports not taking his medications since being home except for gabapentin and statin.  Patient has also missed dialysis since being home.  States he is set up for dialysis today.  Initially while in the ER he declined imaging and signed out AMA.  Patient moved in with his sister since rehab.  His brother-in-law who came to pick him up stated that they cannot transport him back and forth to dialysis sessions.  Patient was given a walker and was ambulating in the ER without any difficulty with the walker.  After multiple discussion between the patient, his brother-in-law and the ER physician patient did leave but then had another fall before he was even able to get inside the car and hit his head and was wheeled back to the ER.    Review of Systems   Constitutional:  Negative for appetite  change, chills and fever.   HENT:  Negative for congestion.    Eyes:  Negative for visual disturbance.   Respiratory:  Negative for chest tightness and shortness of breath.    Cardiovascular:  Negative for chest pain.   Gastrointestinal:  Negative for abdominal pain, diarrhea, nausea and vomiting.   Genitourinary:  Negative for dysuria and hematuria.   Musculoskeletal:         (+) right knee pain   Skin:         (+) right knee abrasion   Neurological:  Negative for dizziness, syncope, light-headedness and headaches.   Hematological:  Does not bruise/bleed easily.   Psychiatric/Behavioral:  Negative for agitation.        Historical Information   Past Medical History:   Diagnosis Date    CKD     Diabetes mellitus (HCC)     Hyperlipidemia     Hypertension     Left toe amputee (HCC)     TIA (transient ischemic attack)      Past Surgical History:   Procedure Laterality Date    AMPUTATION Left     left foot resection 10 years ago dr tran    IR LOWER EXTREMITY ANGIOGRAM  08/29/2023    IR TEMPORARY DIALYSIS CATHETER PLACEMENT  08/25/2023    IR TUNNELED CENTRAL LINE REMOVAL  08/23/2023    IR TUNNELED DIALYSIS CATHETER PLACEMENT  01/27/2022    IR TUNNELED DIALYSIS CATHETER PLACEMENT  08/30/2023    NH AMPUTATION METATARSAL W/TOE SINGLE Right 8/31/2023    Procedure: RIGHT PARTIAL 1ST RAY RESECTION WITH  WOUND VAC APPLICATION;  Surgeon: Won Parmar DPM;  Location: [facility];  Service: Podiatry     Social History     Tobacco Use    Smoking status: Never     Passive exposure: Never    Smokeless tobacco: Never   Vaping Use    Vaping status: Never Used   Substance and Sexual Activity    Alcohol use: Not Currently     Alcohol/week: 0.0 standard drinks of alcohol     Comment: 0    Drug use: Never    Sexual activity: Not on file     E-Cigarette/Vaping    E-Cigarette Use Never User      E-Cigarette/Vaping Substances    Nicotine No     THC No     CBD No     Flavoring No     Other No     Unknown No      History reviewed. No  pertinent family history.  Social History:  Marital Status:    Occupation: none  Patient Pre-hospital Living Situation: With other family member: sister/brother in law  Patient Pre-hospital Level of Mobility: walks with walker  Patient Pre-hospital Diet Restrictions: none    Objective :  Temp:  [97.8 °F (36.6 °C)] 97.8 °F (36.6 °C)  HR:  [77-86] 77  BP: (204-218)/(84-98) 204/84  Resp:  [18] 18  SpO2:  [97 %-99 %] 97 %  O2 Device: None (Room air)    Physical Exam  Vitals reviewed.   Constitutional:       General: He is not in acute distress.     Appearance: He is ill-appearing (chronically). He is not toxic-appearing.   HENT:      Head: Normocephalic and atraumatic.   Eyes:      General:         Right eye: No discharge.         Left eye: No discharge.   Cardiovascular:      Rate and Rhythm: Normal rate and regular rhythm.   Pulmonary:      Effort: Pulmonary effort is normal. No respiratory distress.      Breath sounds: Normal breath sounds. No wheezing or rales.   Abdominal:      General: Bowel sounds are normal. There is no distension.      Palpations: Abdomen is soft.      Tenderness: There is no abdominal tenderness.   Musculoskeletal:      Comments: Bilateral lower extremity edema noted.  Bilateral lower extremity chronic venous stasis changes.  Right knee with abrasion.  Right lower extremity with ulcers/abrasions   Skin:     Comments: Right sided permacath with dressing in place   Neurological:      Mental Status: He is alert.      Comments: Intermittent speech difficulty and some tremors noted         Lines/Drains:            Lab Results: I have reviewed the following results:  Results from last 7 days   Lab Units 10/08/24  0454   WBC Thousand/uL 9.63   HEMOGLOBIN g/dL 10.2*   HEMATOCRIT % 32.3*   PLATELETS Thousands/uL 206   SEGS PCT % 72   LYMPHO PCT % 15   MONO PCT % 10   EOS PCT % 3     Results from last 7 days   Lab Units 10/08/24  0454   SODIUM mmol/L 138   POTASSIUM mmol/L 5.8*   CHLORIDE mmol/L  106   CO2 mmol/L 16*   BUN mg/dL 94*   CREATININE mg/dL 11.08*   ANION GAP mmol/L 16*   CALCIUM mg/dL 8.3*   ALBUMIN g/dL 4.2   TOTAL BILIRUBIN mg/dL 0.52   ALK PHOS U/L 100   ALT U/L 39   AST U/L 25   GLUCOSE RANDOM mg/dL 139         Results from last 7 days   Lab Units 10/08/24  0145   POC GLUCOSE mg/dl 174*     Lab Results   Component Value Date    HGBA1C 6.2 (H) 07/02/2024    HGBA1C 5.4 03/12/2024    HGBA1C 6.6 (H) 07/07/2022           Imaging Results Review: I reviewed radiology reports from this admission including: CT head.  Other Study Results Review: No additional pertinent studies reviewed.    Administrative Statements       ** Please Note: This note has been constructed using a voice recognition system. **

## 2024-10-08 NOTE — QUICK NOTE
Called by RN that patient is acting confused for evaluation.  Later rapid response was called.  Patient was seen by me earlier while he was getting dialysis at which time patient complained of right knee pain.  Patient at the time of rapid response had difficulty following commands and was very fidgety.  Vital signs were stable and blood sugar was 1 84.    Patient oriented to person only.  Difficulty with following commands.  Motor strength 5 out of 5 in all extremities.  Sensation is decreased.    Assessment and plan:  Strokelike symptoms with aphasia-discussed with neurology.  Last seen normal was around 12:58 PM.  Patient deemed a candidate for TNK.  Patient was sent in for CAT scan of the brain and CTA of the head and neck.

## 2024-10-08 NOTE — ASSESSMENT & PLAN NOTE
Initial blood pressure 217/98.  Patient noncompliant with his medications at home  Will give one-time dose of IV hydralazine and restart Procardia XL and monitor blood pressures

## 2024-10-08 NOTE — ED NOTES
Pt taught and demonstrated efficient use of walker, pt able to ambulate with steady gait back and forth down james witnessed by this RN and CURLY Montemayor.      Linda James RN  10/08/24 0555

## 2024-10-08 NOTE — QUICK NOTE
Naresh Carpio is a 63 yo M with PMH of HTN, DM II, ESRD is being stroke alerted for aphasia. Patient was thought to LSN at 12:58pm. Per staff, he was not following instructions and couldn't produce speech. No other focal weakness but NIH was 8.     Per history patient had eliquis reported but it's not in his home meds. He hasn't been given it here either.   Decision was made to treat him with TNK once ct brain is negative for aphasia as it can be significant for patient's quality of life. Staff was notified to proceed with TNK at 1:35pm after personal review of head ct.    Ct brain, cta shows right supraclinoid segment ica moderate to advanced narrowing.   Admit to ICU for post TNK management.  No AP/AC for 24 hrs.  Mri brain  TTE w/ shunt  Tele  Neuro checks  Allow permissive HTN. Systolic <180 per post tnk guidelines.   Dialysis per nephro post contrast.       TNK Decision: After a discussion of risks, benefits and alternatives reviewing inclusion and exclusion criteria the decision was made to proceed with thrombolytic therapy. Specifically discussed were the potential benefits, risks, and side effects of the proposed stroke intervention(s) and care; the likelihood of the patient achieving their goals; and any potential problems that might occur as a result of the intervention(s); reasonable alternatives to the proposed stroke intervention(s) and care. The discussion encompasses risks, benefits and side effects related to the alternatives and the risks related to not receiving the proposed stroke intervention(s) and care. Verbal consent was obtained from the patient..  Consent was obtained by Marjorie Velázquez.  : I  advised the emergency room team to have an appropriate discussion confirming with the patient risks benefits and alternatives and to administer tenecteplase as soon as possible.      Reason for Consult / Principal Problem: stroke   Hx and PE limited by: aphasia   Patient last known well:  12:58pm  Stroke alert called: 1:16pm  Neurology time of arrival: n/a. Discussed case with primary team at 1:18pm and then afterwards to update plan.       I have spent a total time of 35 minutes in caring for this patient on the day of the visit/encounter including Diagnostic results, Instructions for management, Impressions, Counseling / Coordination of care, Documenting in the medical record, Reviewing / ordering tests, medicine, procedures  , and Communicating with other healthcare professionals .

## 2024-10-08 NOTE — PLAN OF CARE
Problem: Potential for Falls  Goal: Patient will remain free of falls  Description: INTERVENTIONS:  - Educate patient/family on patient safety including physical limitations  - Instruct patient to call for assistance with activity   - Consult OT/PT to assist with strengthening/mobility   - Keep Call bell within reach  - Keep bed low and locked with side rails adjusted as appropriate  - Keep care items and personal belongings within reach  - Initiate and maintain comfort rounds  - Make Fall Risk Sign visible to staff  - Offer Toileting every 2 Hours, in advance of need  - Initiate/Maintain bed alarm  - Obtain necessary fall risk management equipment: fall risk sign on door  - Apply yellow socks and bracelet for high fall risk patients  - Consider moving patient to room near nurses station  Outcome: Progressing     Problem: METABOLIC, FLUID AND ELECTROLYTES - ADULT  Goal: Electrolytes maintained within normal limits  Description: INTERVENTIONS:  - Monitor labs and assess patient for signs and symptoms of electrolyte imbalances  - Administer electrolyte replacement as ordered  - Monitor response to electrolyte replacements, including repeat lab results as appropriate  - Instruct patient on fluid and nutrition as appropriate  Outcome: Progressing  Goal: Fluid balance maintained  Description: INTERVENTIONS:  - Monitor labs   - Monitor I/O and WT  - Instruct patient on fluid and nutrition as appropriate  - Assess for signs & symptoms of volume excess or deficit  Outcome: Progressing     Problem: NEUROSENSORY - ADULT  Goal: Achieves stable or improved neurological status  Description: INTERVENTIONS  - Monitor and report changes in neurological status  - Monitor vital signs such as temperature, blood pressure, glucose, and any other labs ordered       Outcome: Progressing  Goal: Achieves maximal functionality and self care  Description: INTERVENTIONS  - Monitor swallowing and airway patency with patient fatigue and  changes in neurological status  - Encourage and assist patient to increase activity and self care.   - Encourage visually impaired, hearing impaired and aphasic patients to use assistive/communication devices  Outcome: Progressing     Problem: Prexisting or High Potential for Compromised Skin Integrity  Goal: Skin integrity is maintained or improved  Description: INTERVENTIONS:  - Identify patients at risk for skin breakdown  - Assess and monitor skin integrity  - Assess and monitor nutrition and hydration status  - Monitor labs   - Assess for incontinence   - Turn and reposition patient  - Assist with mobility/ambulation  - Relieve pressure over bony prominences  - Avoid friction and shearing  - Provide appropriate hygiene as needed including keeping skin clean and dry  - Evaluate need for skin moisturizer/barrier cream  - Collaborate with interdisciplinary team   - Patient/family teaching  - Consider wound care consult   Outcome: Progressing     Problem: PAIN - ADULT  Goal: Verbalizes/displays adequate comfort level or baseline comfort level  Description: Interventions:  - Encourage patient to monitor pain and request assistance  - Assess pain using appropriate pain scale  - Administer analgesics based on type and severity of pain and evaluate response  - Implement non-pharmacological measures as appropriate and evaluate response  - Consider cultural and social influences on pain and pain management  - Notify physician/advanced practitioner if interventions unsuccessful or patient reports new pain  Outcome: Progressing     Problem: INFECTION - ADULT  Goal: Absence or prevention of progression during hospitalization  Description: INTERVENTIONS:  - Assess and monitor for signs and symptoms of infection  - Monitor lab/diagnostic results  - Monitor all insertion sites, i.e. indwelling lines, tubes, and drains  - Administer medications as ordered  - Instruct and encourage patient and family to use good hand hygiene  technique  - Identify and instruct in appropriate isolation precautions for identified infection/condition  Outcome: Progressing  Goal: Absence of fever/infection during neutropenic period  Description: INTERVENTIONS:  - Monitor WBC    Outcome: Progressing     Problem: SAFETY ADULT  Goal: Patient will remain free of falls  Description: INTERVENTIONS:  - Educate patient/family on patient safety including physical limitations  - Instruct patient to call for assistance with activity   - Consult OT/PT to assist with strengthening/mobility   - Keep Call bell within reach  - Keep bed low and locked with side rails adjusted as appropriate  - Keep care items and personal belongings within reach  - Initiate and maintain comfort rounds  - Make Fall Risk Sign visible to staff  - Offer Toileting every 2 Hours, in advance of need  - Initiate/Maintain bed alarm  - Obtain necessary fall risk management equipment: fall risk sign on door  - Apply yellow socks and bracelet for high fall risk patients  - Consider moving patient to room near nurses station  Outcome: Progressing  Goal: Maintain or return to baseline ADL function  Description: INTERVENTIONS:  -  Assess patient's ability to carry out ADLs; assess patient's baseline for ADL function and identify physical deficits which impact ability to perform ADLs (bathing, care of mouth/teeth, toileting, grooming, dressing, etc.)  - Assess/evaluate cause of self-care deficits   - Assess range of motion  - Assess patient's mobility; develop plan if impaired  - Assess patient's need for assistive devices and provide as appropriate  - Encourage maximum independence but intervene and supervise when necessary  - Involve family in performance of ADLs  - Assess for home care needs following discharge   - Consider OT consult to assist with ADL evaluation and planning for discharge  - Provide patient education as appropriate  Outcome: Progressing  Goal: Maintains/Returns to pre admission  functional level  Description: INTERVENTIONS:  - Perform AM-PAC 6 Click Basic Mobility/ Daily Activity assessment daily.  - Set and communicate daily mobility goal to care team and patient/family/caregiver.   - Collaborate with rehabilitation services on mobility goals if consulted  - Perform Range of Motion 2 times a day.  - Reposition patient every 2 hours.  - Dangle patient 2 times a day  - Stand patient 2 times a day  - Ambulate patient 3 times a day  - Out of bed to chair 3 times a day   - Out of bed for meals 3 times a day  - Out of bed for toileting  - Record patient progress and toleration of activity level   Outcome: Progressing     Problem: DISCHARGE PLANNING  Goal: Discharge to home or other facility with appropriate resources  Description: INTERVENTIONS:  - Identify barriers to discharge w/patient and caregiver  - Arrange for needed discharge resources and transportation as appropriate  - Identify discharge learning needs (meds, wound care, etc.)  - Arrange for interpretive services to assist at discharge as needed  - Refer to Case Management Department for coordinating discharge planning if the patient needs post-hospital services based on physician/advanced practitioner order or complex needs related to functional status, cognitive ability, or social support system  Outcome: Progressing     Problem: Knowledge Deficit  Goal: Patient/family/caregiver demonstrates understanding of disease process, treatment plan, medications, and discharge instructions  Description: Complete learning assessment and assess knowledge base.  Interventions:  - Provide teaching at level of understanding  - Provide teaching via preferred learning methods  Outcome: Progressing     Problem: Nutrition/Hydration-ADULT  Goal: Nutrient/Hydration intake appropriate for improving, restoring or maintaining nutritional needs  Description: Monitor and assess patient's nutrition/hydration status for malnutrition. Collaborate with  interdisciplinary team and initiate plan and interventions as ordered.  Monitor patient's weight and dietary intake as ordered or per policy. Utilize nutrition screening tool and intervene as necessary. Determine patient's food preferences and provide high-protein, high-caloric foods as appropriate.     INTERVENTIONS:  - Monitor oral intake, urinary output, labs, and treatment plans  - Assess nutrition and hydration status and recommend course of action  - Evaluate amount of meals eaten  - Assist patient with eating if necessary   - Allow adequate time for meals  - Recommend/ encourage appropriate diets, oral nutritional supplements, and vitamin/mineral supplements  - Order, calculate, and assess calorie counts as needed  - Recommend, monitor, and adjust tube feedings and TPN/PPN based on assessed needs  - Assess need for intravenous fluids  - Provide specific nutrition/hydration education as appropriate  - Include patient/family/caregiver in decisions related to nutrition  Outcome: Progressing     Problem: CARDIOVASCULAR - ADULT  Goal: Maintains optimal cardiac output and hemodynamic stability  Description: INTERVENTIONS:  - Monitor I/O, vital signs and rhythm  - Monitor for S/S and trends of decreased cardiac output  - Administer and titrate ordered vasoactive medications to optimize hemodynamic stability  - Assess quality of pulses, skin color and temperature  - Assess for signs of decreased coronary artery perfusion  - Instruct patient to report change in severity of symptoms  Outcome: Progressing  Goal: Absence of cardiac dysrhythmias or at baseline rhythm  Description: INTERVENTIONS:  - Continuous cardiac monitoring, vital signs, obtain 12 lead EKG if ordered  - Administer antiarrhythmic and heart rate control medications as ordered  - Monitor electrolytes and administer replacement therapy as ordered  Outcome: Progressing     Problem: RESPIRATORY - ADULT  Goal: Achieves optimal ventilation and  oxygenation  Description: INTERVENTIONS:  - Assess for changes in respiratory status  - Assess for changes in mentation and behavior  - Position to facilitate oxygenation and minimize respiratory effort  - Oxygen administered by appropriate delivery if ordered  - Encourage broncho-pulmonary hygiene including cough, deep breathe, Incentive Spirometry  - Assess the need for suctioning and aspirate as needed  - Assess and instruct to report SOB or any respiratory difficulty  - Respiratory Therapy support as indicated  Outcome: Progressing     Problem: HEMATOLOGIC - ADULT  Goal: Maintains hematologic stability  Description: INTERVENTIONS  - Assess for signs and symptoms of bleeding or hemorrhage  - Monitor labs  - Administer supportive blood products/factors as ordered and appropriate  Outcome: Progressing     Problem: MUSCULOSKELETAL - ADULT  Goal: Maintain or return mobility to safest level of function  Description: INTERVENTIONS:  - Assess patient's ability to carry out ADLs; assess patient's baseline for ADL function and identify physical deficits which impact ability to perform ADLs (bathing, care of mouth/teeth, toileting, grooming, dressing, etc.)  - Assess/evaluate cause of self-care deficits   - Assess range of motion  - Assess patient's mobility  - Assess patient's need for assistive devices and provide as appropriate  - Encourage maximum independence but intervene and supervise when necessary  - Involve family in performance of ADLs  - Assess for home care needs following discharge   - Consider OT consult to assist with ADL evaluation and planning for discharge  - Provide patient education as appropriate  Outcome: Progressing  Goal: Maintain proper alignment of affected body part  Description: INTERVENTIONS:  - Support, maintain and protect limb and body alignment  - Provide patient/ family with appropriate education  Outcome: Progressing     Problem: Communication Impairment  Goal: Ability to express needs  and understand communication  Description: Assess patient's communication skills and ability to understand information.  Patient will demonstrate use of effective communication techniques, alternative methods of communication and understanding even if not able to speak.     - Encourage communication and provide alternate methods of communication as needed.  - Collaborate with case management/ for discharge needs.  - Include patient/family/caregiver in decisions related to communication.  Outcome: Progressing     Problem: Potential for Aspiration  Goal: Non-ventilated patient's risk of aspiration is minimized  Description: Assess and monitor vital signs, respiratory status, and labs (WBC).  Monitor for signs of aspiration (tachypnea, cough, rales, wheezing, cyanosis, fever).    - Assess and monitor patient's ability to swallow.  - Place patient up in chair to eat if possible.  - HOB up at 90 degrees to eat if unable to get patient up into chair.  - Supervise patient during oral intake.   - Instruct patient/ family to take small bites.  - Instruct patient/ family to take small single sips when taking liquids.  - Follow patient-specific strategies generated by speech pathologist.  Outcome: Progressing     Problem: Neurological Deficit  Goal: Neurological status is stable or improving  Description: Interventions:  - Monitor and assess patient's level of consciousness, motor function, sensory function, and level of assistance needed for ADLs.   - Monitor and report changes from baseline. Collaborate with interdisciplinary team to initiate plan and implement interventions as ordered.   - Provide and maintain a safe environment.  - Consider seizure precautions.  - Consider fall precautions.  - Consider aspiration precautions.  - Consider bleeding precautions.  Outcome: Progressing     Problem: Activity Intolerance/Impaired Mobility  Goal: Mobility/activity is maintained at optimum level for  patient  Description: Interventions:  - Assess and monitor patient  barriers to mobility and need for assistive/adaptive devices.  - Assess patient's emotional response to limitations.  - Collaborate with interdisciplinary team and initiate plans and interventions as ordered.  - Encourage independent activity per ability.  - Maintain proper body alignment.  - Perform active rom as tolerated/ordered.  - Plan activities to conserve energy.  - Turn patient as appropriate  Outcome: Progressing     Problem: Nutrition  Goal: Nutrition/Hydration status is improving  Description: Monitor and assess patient's nutrition/hydration status for malnutrition (ex- brittle hair, bruises, dry skin, pale skin and conjunctiva, muscle wasting, smooth red tongue, and disorientation). Collaborate with interdisciplinary team and initiate plan and interventions as ordered.  Monitor patient's weight and dietary intake as ordered or per policy. Utilize nutrition screening tool and intervene per policy. Determine patient's food preferences and provide high-protein, high-caloric foods as appropriate.     - Assist patient with eating.  - Allow adequate time for meals.  - Encourage patient to take dietary supplement as ordered.  - Collaborate with clinical nutritionist.  - Include patient/family/caregiver in decisions related to nutrition.  Outcome: Progressing

## 2024-10-08 NOTE — ED NOTES
This RN  found pt ambulating around unit with with walker.  Pt then directed to waiting room by this RN, pt walk with steady gait with use of walker & without difficulties.  This RN walked pt to waiting room.        Linda James RN  10/08/24 [id number]

## 2024-10-08 NOTE — ASSESSMENT & PLAN NOTE
Patient reports fall previously and then again today when he slipped on a wet floor on his way to the bathroom.  Patient then had a  fall when walking out of the ER where he suddenly had tremors in his legs while waling  Recently left rehab about a week ago per patient  CT head, CT cervical spine negative for acute pathology  Right knee x-ray without any fractures per ER but official read is pending  PT OT while here

## 2024-10-08 NOTE — ED PROVIDER NOTES
"Final Diagnosis:  1. Fall, initial encounter    2. Knee abrasion    3. Hyperkalemia    4. Asymptomatic hypertension    5. End-stage renal disease needing dialysis (HCC)        Chief Complaint   Patient presents with    Fall     Pt states on his way to restroom, floor wet and he slipped and fell and injured R knee causing abrasion.       HPI  Pt pres w knee abrasion and pain. He also notes he hasn't gone to dialysis for a week because he felt off.     Tonight he slipped in water. Denies syncope denies LOC. Denies headstrike and refuses CT head and C spine, and is willing to AMA over it.     Nonetheless I still try to care for pt and perform xr knee where he has pain and swelling and abrasion. We update tdap, apply bacitracin to that and chronic wounds. And place a knee immobilizer. He then removes the knee immobilizer.     I don't personally see fracture on xr so I give walker and encourage immobilizer and call rads for read which isn't read in timely manner    Pt keeps asking to just have dialysis here, but I keep encouraging him to go to his new appointment so he doesn't loose his chair as this is his first appt at a new clinic. I check electrolytes and only has mild hyperk and marked hyperphos but things that will improve w/ dialysis. He also has some volume overload.     On tele while here, does fine.     Offer ride by lyft or ambulance but he calls his step brother instead.     He ambulates from room 4 to the vestibule w/o any issue, so I doubt fracture. Does have marked arthritis though so tough read.     His \"step\"brother then comes in and starts yelling about how he can't take care of patient. That they're not actually related, that he dated his sister for a while and she keeps calling him for a ride. He notes the patient was in assisted living but left to live w/ sister but he refuses to take him back after all this missing dialysis and calling him in the night for rides and that he's virtually homeless. The " patient himself does have a car.     We talk about benefits of establishing this new care at dialysis center and coming up with a plan for home health, or other plan since pt left assisted living and ultimately he begrudgingly takes patient. Sister on phone agrees she wants him to go to dialysis. Patient seems to want to stay here. When step brother says he's not going to be allowed to come back home with him he starts getting shakey and falls in front of us.     We heel him back into ER and straight to CT where he is told he really needs a Ct and he agrees. We recheck labs, similar hyperK. I give treatment for hyperK and put on tele. Cts come back ok.     He remains asymptomatic re blood pressure. Initially he is declined from medical floor by nurses but sent to a step down floor for htn, which will resolve w/ lasix and dialysis.     EMS additionally reports:     - Previous charting underwent limited review with attention to last ED visits, labs, ekgs, and prior imaging.  Chart review reveals :     Admission on 07/02/2024, Discharged on 07/06/2024   Component Date Value Ref Range Status    WBC 07/02/2024 7.35  4.31 - 10.16 Thousand/uL Final    RBC 07/02/2024 4.87  3.88 - 5.62 Million/uL Final    Hemoglobin 07/02/2024 14.1  12.0 - 17.0 g/dL Final    Hematocrit 07/02/2024 43.2  36.5 - 49.3 % Final    MCV 07/02/2024 89  82 - 98 fL Final    MCH 07/02/2024 29.0  26.8 - 34.3 pg Final    MCHC 07/02/2024 32.6  31.4 - 37.4 g/dL Final    RDW 07/02/2024 15.4 (H)  11.6 - 15.1 % Final    MPV 07/02/2024 10.7  8.9 - 12.7 fL Final    Platelets 07/02/2024 205  149 - 390 Thousands/uL Final    nRBC 07/02/2024 0  /100 WBCs Final    Segmented % 07/02/2024 66  43 - 75 % Final    Immature Grans % 07/02/2024 1  0 - 2 % Final    Lymphocytes % 07/02/2024 18  14 - 44 % Final    Monocytes % 07/02/2024 9  4 - 12 % Final    Eosinophils Relative 07/02/2024 5  0 - 6 % Final    Basophils Relative 07/02/2024 1  0 - 1 % Final    Absolute Neutrophils  07/02/2024 4.92  1.85 - 7.62 Thousands/µL Final    Absolute Immature Grans 07/02/2024 0.07  0.00 - 0.20 Thousand/uL Final    Absolute Lymphocytes 07/02/2024 1.30  0.60 - 4.47 Thousands/µL Final    Absolute Monocytes 07/02/2024 0.67  0.17 - 1.22 Thousand/µL Final    Eosinophils Absolute 07/02/2024 0.35  0.00 - 0.61 Thousand/µL Final    Basophils Absolute 07/02/2024 0.04  0.00 - 0.10 Thousands/µL Final    Sodium 07/02/2024 130 (L)  135 - 147 mmol/L Final    Potassium 07/02/2024 4.5  3.5 - 5.3 mmol/L Final    Chloride 07/02/2024 92 (L)  96 - 108 mmol/L Final    CO2 07/02/2024 20 (L)  21 - 32 mmol/L Final    ANION GAP 07/02/2024 18 (H)  4 - 13 mmol/L Final    BUN 07/02/2024 69 (H)  5 - 25 mg/dL Final    Creatinine 07/02/2024 11.37 (H)  0.60 - 1.30 mg/dL Final    Standardized to IDMS reference method    Glucose 07/02/2024 115  65 - 140 mg/dL Final    If the patient is fasting, the ADA then defines impaired fasting glucose as > 100 mg/dL and diabetes as > or equal to 123 mg/dL.    Calcium 07/02/2024 8.2 (L)  8.4 - 10.2 mg/dL Final    AST 07/02/2024 12 (L)  13 - 39 U/L Final    ALT 07/02/2024 11  7 - 52 U/L Final    Specimen collection should occur prior to Sulfasalazine administration due to the potential for falsely depressed results.     Alkaline Phosphatase 07/02/2024 64  34 - 104 U/L Final    Total Protein 07/02/2024 7.0  6.4 - 8.4 g/dL Final    Albumin 07/02/2024 4.0  3.5 - 5.0 g/dL Final    Total Bilirubin 07/02/2024 0.44  0.20 - 1.00 mg/dL Final    Use of this assay is not recommended for patients undergoing treatment with eltrombopag due to the potential for falsely elevated results.  N-acetyl-p-benzoquinone imine (metabolite of Acetaminophen) will generate erroneously low results in samples for patients that have taken an overdose of Acetaminophen.    eGFR 07/02/2024 4  ml/min/1.73sq m Final    Magnesium 07/02/2024 2.1  1.9 - 2.7 mg/dL Final    Hemoglobin A1C 07/02/2024 6.2 (H)  Normal 4.0-5.6%; PreDiabetic  5.7-6.4%; Diabetic >=6.5%; Glycemic control for adults with diabetes <7.0% % Final    EAG 07/02/2024 131  mg/dl Final    Hemoglobin 07/03/2024 13.6  12.0 - 17.0 g/dL Final    Hematocrit 07/03/2024 41.7  36.5 - 49.3 % Final    POC Glucose 07/02/2024 115  65 - 140 mg/dl Final    Sodium 07/03/2024 132 (L)  135 - 147 mmol/L Final    Potassium 07/03/2024 4.5  3.5 - 5.3 mmol/L Final    Chloride 07/03/2024 90 (L)  96 - 108 mmol/L Final    CO2 07/03/2024 26  21 - 32 mmol/L Final    ANION GAP 07/03/2024 16 (H)  4 - 13 mmol/L Final    BUN 07/03/2024 72 (H)  5 - 25 mg/dL Final    Creatinine 07/03/2024 11.51 (H)  0.60 - 1.30 mg/dL Final    Standardized to IDMS reference method    Glucose 07/03/2024 88  65 - 140 mg/dL Final    If the patient is fasting, the ADA then defines impaired fasting glucose as > 100 mg/dL and diabetes as > or equal to 123 mg/dL.    Calcium 07/03/2024 8.1 (L)  8.4 - 10.2 mg/dL Final    eGFR 07/03/2024 4  ml/min/1.73sq m Final    WBC 07/03/2024 7.68  4.31 - 10.16 Thousand/uL Final    RBC 07/03/2024 4.57  3.88 - 5.62 Million/uL Final    Hemoglobin 07/03/2024 13.5  12.0 - 17.0 g/dL Final    Hematocrit 07/03/2024 41.2  36.5 - 49.3 % Final    MCV 07/03/2024 90  82 - 98 fL Final    MCH 07/03/2024 29.5  26.8 - 34.3 pg Final    MCHC 07/03/2024 32.8  31.4 - 37.4 g/dL Final    RDW 07/03/2024 15.6 (H)  11.6 - 15.1 % Final    MPV 07/03/2024 10.4  8.9 - 12.7 fL Final    Platelets 07/03/2024 205  149 - 390 Thousands/uL Final    nRBC 07/03/2024 0  /100 WBCs Final    Segmented % 07/03/2024 72  43 - 75 % Final    Immature Grans % 07/03/2024 0  0 - 2 % Final    Lymphocytes % 07/03/2024 16  14 - 44 % Final    Monocytes % 07/03/2024 9  4 - 12 % Final    Eosinophils Relative 07/03/2024 2  0 - 6 % Final    Basophils Relative 07/03/2024 1  0 - 1 % Final    Absolute Neutrophils 07/03/2024 5.56  1.85 - 7.62 Thousands/µL Final    Absolute Immature Grans 07/03/2024 0.03  0.00 - 0.20 Thousand/uL Final    Absolute Lymphocytes  07/03/2024 1.20  0.60 - 4.47 Thousands/µL Final    Absolute Monocytes 07/03/2024 0.69  0.17 - 1.22 Thousand/µL Final    Eosinophils Absolute 07/03/2024 0.16  0.00 - 0.61 Thousand/µL Final    Basophils Absolute 07/03/2024 0.04  0.00 - 0.10 Thousands/µL Final    Magnesium 07/03/2024 2.2  1.9 - 2.7 mg/dL Final    Phosphorus 07/03/2024 7.6 (H)  2.3 - 4.1 mg/dL Final    POC Glucose 07/03/2024 97  65 - 140 mg/dl Final    MRSA Culture Only 07/03/2024 No Methicillin Resistant Staphlyococcus aureus (MRSA) isolated   Final    POC Glucose 07/03/2024 112  65 - 140 mg/dl Final    POC Glucose 07/03/2024 135  65 - 140 mg/dl Final    POC Glucose 07/03/2024 198 (H)  65 - 140 mg/dl Final    WBC 07/04/2024 9.94  4.31 - 10.16 Thousand/uL Final    RBC 07/04/2024 4.52  3.88 - 5.62 Million/uL Final    Hemoglobin 07/04/2024 13.3  12.0 - 17.0 g/dL Final    Hematocrit 07/04/2024 41.2  36.5 - 49.3 % Final    MCV 07/04/2024 91  82 - 98 fL Final    MCH 07/04/2024 29.4  26.8 - 34.3 pg Final    MCHC 07/04/2024 32.3  31.4 - 37.4 g/dL Final    RDW 07/04/2024 15.8 (H)  11.6 - 15.1 % Final    MPV 07/04/2024 10.3  8.9 - 12.7 fL Final    Platelets 07/04/2024 201  149 - 390 Thousands/uL Final    nRBC 07/04/2024 0  /100 WBCs Final    Segmented % 07/04/2024 75  43 - 75 % Final    Immature Grans % 07/04/2024 1  0 - 2 % Final    Lymphocytes % 07/04/2024 11 (L)  14 - 44 % Final    Monocytes % 07/04/2024 11  4 - 12 % Final    Eosinophils Relative 07/04/2024 2  0 - 6 % Final    Basophils Relative 07/04/2024 0  0 - 1 % Final    Absolute Neutrophils 07/04/2024 7.53  1.85 - 7.62 Thousands/µL Final    Absolute Immature Grans 07/04/2024 0.06  0.00 - 0.20 Thousand/uL Final    Absolute Lymphocytes 07/04/2024 1.07  0.60 - 4.47 Thousands/µL Final    Absolute Monocytes 07/04/2024 1.07  0.17 - 1.22 Thousand/µL Final    Eosinophils Absolute 07/04/2024 0.18  0.00 - 0.61 Thousand/µL Final    Basophils Absolute 07/04/2024 0.03  0.00 - 0.10 Thousands/µL Final    Sodium  07/04/2024 130 (L)  135 - 147 mmol/L Final    Potassium 07/04/2024 4.8  3.5 - 5.3 mmol/L Final    Chloride 07/04/2024 96  96 - 108 mmol/L Final    CO2 07/04/2024 21  21 - 32 mmol/L Final    ANION GAP 07/04/2024 13  4 - 13 mmol/L Final    BUN 07/04/2024 39 (H)  5 - 25 mg/dL Final    Creatinine 07/04/2024 8.00 (H)  0.60 - 1.30 mg/dL Final    Standardized to IDMS reference method    Glucose 07/04/2024 123  65 - 140 mg/dL Final    If the patient is fasting, the ADA then defines impaired fasting glucose as > 100 mg/dL and diabetes as > or equal to 123 mg/dL.    Calcium 07/04/2024 8.4  8.4 - 10.2 mg/dL Final    eGFR 07/04/2024 6  ml/min/1.73sq m Final    POC Glucose 07/04/2024 128  65 - 140 mg/dl Final    POC Glucose 07/04/2024 117  65 - 140 mg/dl Final    POC Glucose 07/04/2024 149 (H)  65 - 140 mg/dl Final    POC Glucose 07/04/2024 150 (H)  65 - 140 mg/dl Final    WBC 07/05/2024 10.42 (H)  4.31 - 10.16 Thousand/uL Final    RBC 07/05/2024 4.02  3.88 - 5.62 Million/uL Final    Hemoglobin 07/05/2024 11.8 (L)  12.0 - 17.0 g/dL Final    Hematocrit 07/05/2024 37.0  36.5 - 49.3 % Final    MCV 07/05/2024 92  82 - 98 fL Final    MCH 07/05/2024 29.4  26.8 - 34.3 pg Final    MCHC 07/05/2024 31.9  31.4 - 37.4 g/dL Final    RDW 07/05/2024 15.9 (H)  11.6 - 15.1 % Final    MPV 07/05/2024 10.0  8.9 - 12.7 fL Final    Platelets 07/05/2024 175  149 - 390 Thousands/uL Final    nRBC 07/05/2024 0  /100 WBCs Final    Segmented % 07/05/2024 72  43 - 75 % Final    Immature Grans % 07/05/2024 0  0 - 2 % Final    Lymphocytes % 07/05/2024 12 (L)  14 - 44 % Final    Monocytes % 07/05/2024 13 (H)  4 - 12 % Final    Eosinophils Relative 07/05/2024 3  0 - 6 % Final    Basophils Relative 07/05/2024 0  0 - 1 % Final    Absolute Neutrophils 07/05/2024 7.45  1.85 - 7.62 Thousands/µL Final    Absolute Immature Grans 07/05/2024 0.04  0.00 - 0.20 Thousand/uL Final    Absolute Lymphocytes 07/05/2024 1.28  0.60 - 4.47 Thousands/µL Final    Absolute Monocytes  07/05/2024 1.31 (H)  0.17 - 1.22 Thousand/µL Final    Eosinophils Absolute 07/05/2024 0.30  0.00 - 0.61 Thousand/µL Final    Basophils Absolute 07/05/2024 0.04  0.00 - 0.10 Thousands/µL Final    Sodium 07/05/2024 131 (L)  135 - 147 mmol/L Final    Potassium 07/05/2024 4.4  3.5 - 5.3 mmol/L Final    Chloride 07/05/2024 95 (L)  96 - 108 mmol/L Final    CO2 07/05/2024 26  21 - 32 mmol/L Final    ANION GAP 07/05/2024 10  4 - 13 mmol/L Final    BUN 07/05/2024 28 (H)  5 - 25 mg/dL Final    Creatinine 07/05/2024 6.53 (H)  0.60 - 1.30 mg/dL Final    Standardized to IDMS reference method    Glucose 07/05/2024 108  65 - 140 mg/dL Final    If the patient is fasting, the ADA then defines impaired fasting glucose as > 100 mg/dL and diabetes as > or equal to 123 mg/dL.    Calcium 07/05/2024 8.1 (L)  8.4 - 10.2 mg/dL Final    eGFR 07/05/2024 8  ml/min/1.73sq m Final    POC Glucose 07/05/2024 103  65 - 140 mg/dl Final    POC Glucose 07/05/2024 138  65 - 140 mg/dl Final    POC Glucose 07/05/2024 115  65 - 140 mg/dl Final    POC Glucose 07/05/2024 149 (H)  65 - 140 mg/dl Final    WBC 07/06/2024 9.75  4.31 - 10.16 Thousand/uL Final    RBC 07/06/2024 3.94  3.88 - 5.62 Million/uL Final    Hemoglobin 07/06/2024 11.5 (L)  12.0 - 17.0 g/dL Final    Hematocrit 07/06/2024 36.6  36.5 - 49.3 % Final    MCV 07/06/2024 93  82 - 98 fL Final    MCH 07/06/2024 29.2  26.8 - 34.3 pg Final    MCHC 07/06/2024 31.4  31.4 - 37.4 g/dL Final    RDW 07/06/2024 15.9 (H)  11.6 - 15.1 % Final    MPV 07/06/2024 10.3  8.9 - 12.7 fL Final    Platelets 07/06/2024 190  149 - 390 Thousands/uL Final    nRBC 07/06/2024 0  /100 WBCs Final    Segmented % 07/06/2024 70  43 - 75 % Final    Immature Grans % 07/06/2024 1  0 - 2 % Final    Lymphocytes % 07/06/2024 12 (L)  14 - 44 % Final    Monocytes % 07/06/2024 12  4 - 12 % Final    Eosinophils Relative 07/06/2024 5  0 - 6 % Final    Basophils Relative 07/06/2024 0  0 - 1 % Final    Absolute Neutrophils 07/06/2024 6.91   1.85 - 7.62 Thousands/µL Final    Absolute Immature Grans 07/06/2024 0.06  0.00 - 0.20 Thousand/uL Final    Absolute Lymphocytes 07/06/2024 1.16  0.60 - 4.47 Thousands/µL Final    Absolute Monocytes 07/06/2024 1.12  0.17 - 1.22 Thousand/µL Final    Eosinophils Absolute 07/06/2024 0.46  0.00 - 0.61 Thousand/µL Final    Basophils Absolute 07/06/2024 0.04  0.00 - 0.10 Thousands/µL Final    Sodium 07/06/2024 132 (L)  135 - 147 mmol/L Final    Potassium 07/06/2024 4.8  3.5 - 5.3 mmol/L Final    Chloride 07/06/2024 93 (L)  96 - 108 mmol/L Final    CO2 07/06/2024 26  21 - 32 mmol/L Final    ANION GAP 07/06/2024 13  4 - 13 mmol/L Final    BUN 07/06/2024 44 (H)  5 - 25 mg/dL Final    Creatinine 07/06/2024 9.00 (H)  0.60 - 1.30 mg/dL Final    Standardized to IDMS reference method    Glucose 07/06/2024 123  65 - 140 mg/dL Final    If the patient is fasting, the ADA then defines impaired fasting glucose as > 100 mg/dL and diabetes as > or equal to 123 mg/dL.    Calcium 07/06/2024 8.5  8.4 - 10.2 mg/dL Final    eGFR 07/06/2024 5  ml/min/1.73sq m Final    Phosphorus 07/06/2024 6.7 (H)  2.3 - 4.1 mg/dL Final    POC Glucose 07/06/2024 107  65 - 140 mg/dl Final    POC Glucose 07/06/2024 117  65 - 140 mg/dl Final       - No language barrier.   - History obtained from patient    - Discuss patient's care, with patient permission or by chart review, with  sister and family friend.     PMH:   has a past medical history of CKD, Diabetes mellitus (HCC), Hyperlipidemia, Hypertension, Left toe amputee (HCC), and TIA (transient ischemic attack).    PSH:   has a past surgical history that includes IR tunneled dialysis catheter placement (01/27/2022); IR tunneled central line removal (08/23/2023); IR temporary dialysis catheter placement (08/25/2023); IR lower extremity angiogram (08/29/2023); IR tunneled dialysis catheter placement (08/30/2023); Amputation (Left); and pr amputation metatarsal w/toe single (Right, 8/31/2023).     Social  History:  Tobacco Use: Low Risk  (10/8/2024)    Patient History     Smoking Tobacco Use: Never     Smokeless Tobacco Use: Never     Passive Exposure: Never     Alcohol Use: Not At Risk (8/19/2023)    AUDIT-C     Frequency of Alcohol Consumption: Never     Average Number of Drinks: Patient does not drink     Frequency of Binge Drinking: Never     No illicit use       ROS:  Pertinent positives/negatives: .     Some ROS may be present in the HPI and would take precedent over these standard questions asked below.   Review of Systems   Constitutional:  Positive for fatigue.   Musculoskeletal:  Positive for arthralgias, gait problem and joint swelling.   Skin:  Positive for wound.   Neurological:  Positive for tremors.        CONSTITUTIONAL:  No lethargy. No unexpected weight loss. No change in behavior.  EYES:  No pain, redness, or discharge. No loss of vision. No orbital trauma or pain.   ENT:  No tinnitus or decreased hearing. No epistaxis/purulent rhinorrhea. No voice change, airway closing, trismus.   CARDIOVASCULAR:  No chest pain. No skin mottling or pallor. No change in exertional capacity  RESPIRATORY:  No hemoptysis. No paroxysmal nocturnal dyspnea. No stridor. No apnea or bluing.   GASTROINTESTINAL:  No vomiting, diarrhea. No distension. No melena. No hematochezia.   GENITOURINARY:  No nocturia. No hematuria or foul smelling or cloudy urine. No discharge. No sores/adenopathy.   MUSCULOSKELETAL:  No contracture.  No new deformity.   INTEGUMENTARY:  No swelling. No unexpected contusions. No abrasions. No lymphangitis.  NEUROLOGIC:  No meningismus. No new numbness of the extremities. No new focal weakness. No postural instability  PSYCHIATRIC:  No SI HI AVH  HEMATOLOGICAL:  No bleeding. No petechiae. No bruising.  ALLERGIES:  No urticaria. No sudden abd cramping. No stridor.    PE:     Physical exam highlights:   Physical Exam       Vitals:    10/08/24 0530 10/08/24 0600 10/08/24 0615 10/08/24 0625   BP: (!)  204/84 (!) 208/83 (!) 199/86 (!) 178/60   BP Location:  Right arm Right arm Right arm   Pulse: 77 72 76 98   Resp:  21 16 18   Temp:       TempSrc:       SpO2: 97% 97% 100% 99%   Weight:         Vitals reviewed by me.   Nursing note reviewed  Chaperone present for all sensitive exam.  Const: No acute distress. Alert. Nontoxic. Not diaphoretic.    HEENT: External ears normal. No protrusion drainage swelling. Nose normal. No drainage/traumatic deformity. MM. Mouth with baseline/symmetric movement. No trismus.   Eyes: No squinting. No icterus. No tearing/swelling/drainage. Tracks through the room with normal EOM.   Neck: ROM normal. No rigidity. No meningismus.  Cards: Rate as per vitals Compared to monitor sinus unless documented. Regular Well perfused.  Pulm: Effort and excursion normal. No distress. No audible wheezing/no stridor. Normal resp rate without retraction or change in work of breathing.  Abd: No distension beyond baseline. No fluctuant wave. Patient without peritoneal pain with shifting/bumping the bed.   MSK: ROM limited at right knee. Abrasion there. Joint effusion. .   Skin: venous stasis bilateral. Pitting to shin. Wounds chronic on shins. Partial amputations. New knee abrasion.   Neuro: Nonfocal. Baseline. CN grossly intact. Moving all four with coordination.   Psych: Normal behavior and affect.        A:  - Nursing note reviewed.    Ddx and MDM  Considered diagnoses    R/o ICH/skull injury  CT head  R/o C spine injury  CT c spine  Agrees after initial AMA    R/o knee fx  Knee immob would be ideal  Walker to ambulate  PT would benefit him  F/u formal read    Tdap given  Bacitracin topically    Treat hyperk  Insulin  Dextrose  Albuterol  Lasix  Calcium  Tele  EKG    Procedure Note: EKG  Date/Time: 10/08/24 7:08 AM   Interpreted by: Dakota Spann  Indications / Diagnosis: hyperk  ECG reviewed by me, the ED Provider: yes   The EKG demonstrates:  Rhythm: sinus    Intervals: normal intervals  Axis:  normal axis  QRS/Blocks: normal QRS  ST Changes: No acute ST Changes, no STD/NAZARIO.  T wave changes: none      Manage chronic htn inpatient.     Consult nephro dialysis    Dispo decision       My conversation with consultant reveals:        Decision rules:                    ED Course as of 10/08/24 0705   Tue Oct 08, 2024   0529 CT head without contrast         My read of the XR/CT scan reveals:     CT head without contrast   Final Result      No acute intracranial abnormality.                  Workstation performed: [id number]         CT spine cervical without contrast   Final Result      No cervical spine fracture or traumatic malalignment.                  Workstation performed: [id number]         XR knee 4+ vw right injury   Final Result      No acute osseous abnormality.   Severe tricompartmental osteoarthritis. Moderate joint effusion.      Computerized Assisted Algorithm (CAA) may have been used to analyze all applicable images.         Workstation performed: [id number]             Orders Placed This Encounter   Procedures    MRSA culture    CT head without contrast    CT spine cervical without contrast    XR knee 4+ vw right injury    CBC and differential    Comprehensive metabolic panel    Phosphorus    Magnesium    CBC and differential    Comprehensive metabolic panel    Hemoglobin A1c w/EAG Estimation (Prechecked if no A1C within 90 days)    Knee Immobilizer    Nursing Communication walker    Insert peripheral IV    Reason for No Heparin    Reason for No Epogen    Insulin Subcutaneous Notify Physician    Insulin Subcutaneous Instruction    Hypoglycemia Protocol    Fingerstick Glucose (POCT) Before meals and at bedtime    Fingerstick Glucose (POCT)    Inpatient consult to Nephrology    ECG 12 lead    Hemodialysis Adult    Place in Observation     Labs Reviewed   CBC AND DIFFERENTIAL - Abnormal       Result Value Ref Range Status    WBC 7.36  4.31 - 10.16 Thousand/uL Final    RBC 3.36 (*) 3.88 - 5.62  Million/uL Final    Hemoglobin 9.8 (*) 12.0 - 17.0 g/dL Final    Hematocrit 31.6 (*) 36.5 - 49.3 % Final    MCV 94  82 - 98 fL Final    MCH 29.2  26.8 - 34.3 pg Final    MCHC 31.0 (*) 31.4 - 37.4 g/dL Final    RDW 16.9 (*) 11.6 - 15.1 % Final    MPV 9.4  8.9 - 12.7 fL Final    Platelets 192  149 - 390 Thousands/uL Final    nRBC 0  /100 WBCs Final    Segmented % 76 (*) 43 - 75 % Final    Immature Grans % 0  0 - 2 % Final    Lymphocytes % 12 (*) 14 - 44 % Final    Monocytes % 8  4 - 12 % Final    Eosinophils Relative 3  0 - 6 % Final    Basophils Relative 1  0 - 1 % Final    Absolute Neutrophils 5.61  1.85 - 7.62 Thousands/µL Final    Absolute Immature Grans 0.03  0.00 - 0.20 Thousand/uL Final    Absolute Lymphocytes 0.85  0.60 - 4.47 Thousands/µL Final    Absolute Monocytes 0.59  0.17 - 1.22 Thousand/µL Final    Eosinophils Absolute 0.24  0.00 - 0.61 Thousand/µL Final    Basophils Absolute 0.04  0.00 - 0.10 Thousands/µL Final   COMPREHENSIVE METABOLIC PANEL - Abnormal    Sodium 138  135 - 147 mmol/L Final    Potassium 5.8 (*) 3.5 - 5.3 mmol/L Final    Chloride 105  96 - 108 mmol/L Final    CO2 19 (*) 21 - 32 mmol/L Final    ANION GAP 14 (*) 4 - 13 mmol/L Final    BUN 95 (*) 5 - 25 mg/dL Final    Creatinine 11.13 (*) 0.60 - 1.30 mg/dL Final    Comment: Standardized to IDMS reference method    Glucose 145 (*) 65 - 140 mg/dL Final    Comment: If the patient is fasting, the ADA then defines impaired fasting glucose as > 100 mg/dL and diabetes as > or equal to 123 mg/dL.    Calcium 8.1 (*) 8.4 - 10.2 mg/dL Final    AST 24  13 - 39 U/L Final    ALT 37  7 - 52 U/L Final    Comment: Specimen collection should occur prior to Sulfasalazine administration due to the potential for falsely depressed results.     Alkaline Phosphatase 95  34 - 104 U/L Final    Total Protein 7.1  6.4 - 8.4 g/dL Final    Albumin 4.0  3.5 - 5.0 g/dL Final    Total Bilirubin 0.45  0.20 - 1.00 mg/dL Final    Comment: Use of this assay is not recommended  for patients undergoing treatment with eltrombopag due to the potential for falsely elevated results.  N-acetyl-p-benzoquinone imine (metabolite of Acetaminophen) will generate erroneously low results in samples for patients that have taken an overdose of Acetaminophen.    eGFR 4  ml/min/1.73sq m Final    Narrative:     [employer] guidelines for Chronic Kidney Disease (CKD):     Stage 1 with normal or high GFR (GFR > 90 mL/min/1.73 square meters)    Stage 2 Mild CKD (GFR = 60-89 mL/min/1.73 square meters)    Stage 3A Moderate CKD (GFR = 45-59 mL/min/1.73 square meters)    Stage 3B Moderate CKD (GFR = 30-44 mL/min/1.73 square meters)    Stage 4 Severe CKD (GFR = 15-29 mL/min/1.73 square meters)    Stage 5 End Stage CKD (GFR <15 mL/min/1.73 square meters)  Note: GFR calculation is accurate only with a steady state creatinine   PHOSPHORUS - Abnormal    Phosphorus 7.7 (*) 2.3 - 4.1 mg/dL Final   CBC AND DIFFERENTIAL - Abnormal    WBC 9.63  4.31 - 10.16 Thousand/uL Final    RBC 3.44 (*) 3.88 - 5.62 Million/uL Final    Hemoglobin 10.2 (*) 12.0 - 17.0 g/dL Final    Hematocrit 32.3 (*) 36.5 - 49.3 % Final    MCV 94  82 - 98 fL Final    MCH 29.7  26.8 - 34.3 pg Final    MCHC 31.6  31.4 - 37.4 g/dL Final    RDW 16.8 (*) 11.6 - 15.1 % Final    MPV 9.7  8.9 - 12.7 fL Final    Platelets 206  149 - 390 Thousands/uL Final    nRBC 0  /100 WBCs Final    Segmented % 72  43 - 75 % Final    Immature Grans % 0  0 - 2 % Final    Lymphocytes % 15  14 - 44 % Final    Monocytes % 10  4 - 12 % Final    Eosinophils Relative 3  0 - 6 % Final    Basophils Relative 0  0 - 1 % Final    Absolute Neutrophils 6.88  1.85 - 7.62 Thousands/µL Final    Absolute Immature Grans 0.02  0.00 - 0.20 Thousand/uL Final    Absolute Lymphocytes 1.45  0.60 - 4.47 Thousands/µL Final    Absolute Monocytes 0.93  0.17 - 1.22 Thousand/µL Final    Eosinophils Absolute 0.31  0.00 - 0.61 Thousand/µL Final    Basophils Absolute 0.04  0.00 - 0.10  Thousands/µL Final   COMPREHENSIVE METABOLIC PANEL - Abnormal    Sodium 138  135 - 147 mmol/L Final    Potassium 5.8 (*) 3.5 - 5.3 mmol/L Final    Chloride 106  96 - 108 mmol/L Final    CO2 16 (*) 21 - 32 mmol/L Final    ANION GAP 16 (*) 4 - 13 mmol/L Final    BUN 94 (*) 5 - 25 mg/dL Final    Creatinine 11.08 (*) 0.60 - 1.30 mg/dL Final    Comment: Standardized to IDMS reference method    Glucose 139  65 - 140 mg/dL Final    Comment: If the patient is fasting, the ADA then defines impaired fasting glucose as > 100 mg/dL and diabetes as > or equal to 123 mg/dL.    Calcium 8.3 (*) 8.4 - 10.2 mg/dL Final    AST 25  13 - 39 U/L Final    ALT 39  7 - 52 U/L Final    Comment: Specimen collection should occur prior to Sulfasalazine administration due to the potential for falsely depressed results.     Alkaline Phosphatase 100  34 - 104 U/L Final    Total Protein 7.5  6.4 - 8.4 g/dL Final    Albumin 4.2  3.5 - 5.0 g/dL Final    Total Bilirubin 0.52  0.20 - 1.00 mg/dL Final    Comment: Use of this assay is not recommended for patients undergoing treatment with eltrombopag due to the potential for falsely elevated results.  N-acetyl-p-benzoquinone imine (metabolite of Acetaminophen) will generate erroneously low results in samples for patients that have taken an overdose of Acetaminophen.    eGFR 4  ml/min/1.73sq m Final    Narrative:     [employer] guidelines for Chronic Kidney Disease (CKD):     Stage 1 with normal or high GFR (GFR > 90 mL/min/1.73 square meters)    Stage 2 Mild CKD (GFR = 60-89 mL/min/1.73 square meters)    Stage 3A Moderate CKD (GFR = 45-59 mL/min/1.73 square meters)    Stage 3B Moderate CKD (GFR = 30-44 mL/min/1.73 square meters)    Stage 4 Severe CKD (GFR = 15-29 mL/min/1.73 square meters)    Stage 5 End Stage CKD (GFR <15 mL/min/1.73 square meters)  Note: GFR calculation is accurate only with a steady state creatinine   POCT GLUCOSE - Abnormal    POC Glucose 174 (*) 65 - 140  mg/dl Final   MAGNESIUM - Normal    Magnesium 2.3  1.9 - 2.7 mg/dL Final       *Each of these labs was reviewed. Particular standout labs will be noted in the ED Course above     Final Diagnosis:  1. Fall, initial encounter    2. Knee abrasion    3. Hyperkalemia    4. Asymptomatic hypertension    5. End-stage renal disease needing dialysis (HCC)          P:  - hospital tx includes   Medications   insulin lispro (HumALOG/ADMELOG) 100 units/mL subcutaneous injection 1-5 Units (has no administration in time range)   tetanus-diphtheria-acellular pertussis (BOOSTRIX) IM injection 0.5 mL (0.5 mL Intramuscular Given 10/8/24 0214)   acetaminophen (TYLENOL) tablet 975 mg (975 mg Oral Given 10/8/24 0217)   bacitracin topical ointment 1 large application (1 large application Topical Given 10/8/24 0216)   dextrose infusion 10 % bolus (0 mL Intravenous Stopped 10/8/24 0624)   calcium gluconate 1 g in sodium chloride 0.9% 50 mL (premix) (1 g Intravenous New Bag 10/8/24 0621)   furosemide (LASIX) injection 80 mg (80 mg Intravenous Given 10/8/24 0556)   albuterol inhalation solution 5 mg (5 mg Nebulization Given 10/8/24 0611)   insulin regular (HumuLIN R,NovoLIN R) injection 10 Units (10 Units Intravenous Given 10/8/24 0605)   hydrALAZINE (APRESOLINE) injection 10 mg (10 mg Intravenous Given 10/8/24 0617)         - disposition  Time reflects when diagnosis was documented in both MDM as applicable and the Disposition within this note       Time User Action Codes Description Comment    10/8/2024  2:02 AM Dakota Spann [W19.XXXA] Fall, initial encounter     10/8/2024  2:02 AM Dakota Spann [S80.219A] Knee abrasion     10/8/2024  6:55 AM Dakota Spann [E87.5] Hyperkalemia     10/8/2024  6:55 AM Dakota Spann [I10] Asymptomatic hypertension     10/8/2024  6:55 AM Dakota Spann [N18.6,  Z99.2] End-stage renal disease needing dialysis (HCC)           ED Disposition       ED Disposition   Admit     Condition   Stable    Date/Time   Tue Oct 8, 2024  6:55 AM    Comment   Obs, medicine             Follow-up Information    None         - patient will call their PCP to let them know they were in the emergency department. We discuss return precautions and patient is agreeable with plan and aformentioned disposition.       - additional treatment intended, if consistent with primary provider:  - patient to follow with :      Current Discharge Medication List        CONTINUE these medications which have NOT CHANGED    Details   acetaminophen (TYLENOL) 325 mg tablet Take 2 tablets (650 mg total) by mouth every 6 (six) hours as needed for mild pain, headaches or fever    Associated Diagnoses: Sacral fracture, closed (HCC)      allopurinol (ZYLOPRIM) 100 mg tablet Take 1 tablet (100 mg total) by mouth daily  Qty: 30 tablet, Refills: 0    Associated Diagnoses: Gout      atorvastatin (LIPITOR) 80 mg tablet Take 80 mg by mouth daily      docusate sodium (COLACE) 100 mg capsule Take 1 capsule (100 mg total) by mouth 2 (two) times a day  Qty: 60 capsule, Refills: 0    Associated Diagnoses: Constipation      enoxaparin (LOVENOX) 30 mg/0.3 mL Inject 0.3 mL (30 mg total) under the skin every 24 hours    Associated Diagnoses: Closed fracture of right pelvis (HCC)      gabapentin (NEURONTIN) 100 mg capsule Take 2 capsules (200 mg total) by mouth 2 (two) times a day  Qty: 120 capsule, Refills: 0    Associated Diagnoses: Diabetic polyneuropathy associated with type 2 diabetes mellitus (HCC)      mupirocin (BACTROBAN) 2 % ointment Apply topically 3 (three) times a day    Associated Diagnoses: Facial cellulitis      naloxone (NARCAN) 4 mg/0.1 mL nasal spray Administer 1 spray into a nostril. If no response after 2-3 minutes, give another dose in the other nostril using a new spray.  Qty: 1 each, Refills: 0    Associated Diagnoses: Left hip pain      NIFEdipine (PROCARDIA XL) 30 mg 24 hr tablet Take 1 tablet (30 mg total) by mouth daily  after dinner    Associated Diagnoses: Essential hypertension      polyethylene glycol (MIRALAX) 17 g packet Take 17 g by mouth daily Do not start before September 7, 2023.  Refills: 0    Associated Diagnoses: Constipation      sevelamer (RENAGEL) 800 mg tablet Take 1 tablet (800 mg total) by mouth 3 (three) times a day with meals  Refills: 0    Associated Diagnoses: ESRD (end stage renal disease) ([facility])           No discharge procedures on file.  Prior to Admission Medications   Prescriptions Last Dose Informant Patient Reported? Taking?   NIFEdipine (PROCARDIA XL) 30 mg 24 hr tablet  Self No No   Sig: Take 1 tablet (30 mg total) by mouth daily after dinner   acetaminophen (TYLENOL) 325 mg tablet  Self No No   Sig: Take 2 tablets (650 mg total) by mouth every 6 (six) hours as needed for mild pain, headaches or fever   allopurinol (ZYLOPRIM) 100 mg tablet  Self No No   Sig: Take 1 tablet (100 mg total) by mouth daily   atorvastatin (LIPITOR) 80 mg tablet   Yes No   Sig: Take 80 mg by mouth daily   docusate sodium (COLACE) 100 mg capsule  Self No No   Sig: Take 1 capsule (100 mg total) by mouth 2 (two) times a day   enoxaparin (LOVENOX) 30 mg/0.3 mL   No No   Sig: Inject 0.3 mL (30 mg total) under the skin every 24 hours   gabapentin (NEURONTIN) 100 mg capsule   No No   Sig: Take 2 capsules (200 mg total) by mouth 2 (two) times a day   mupirocin (BACTROBAN) 2 % ointment  Self No No   Sig: Apply topically 3 (three) times a day   naloxone (NARCAN) 4 mg/0.1 mL nasal spray  Self No No   Sig: Administer 1 spray into a nostril. If no response after 2-3 minutes, give another dose in the other nostril using a new spray.   polyethylene glycol (MIRALAX) 17 g packet  Self No No   Sig: Take 17 g by mouth daily Do not start before September 7, 2023.   sevelamer (RENAGEL) 800 mg tablet  Self No No   Sig: Take 1 tablet (800 mg total) by mouth 3 (three) times a day with meals      Facility-Administered Medications: None       Portions  "of the record may have been created with voice recognition software. Occasional wrong word or \"sound a like\" substitutions may have occurred due to the inherent limitations of voice recognition software. Read the chart carefully and recognize, using context, where substitutions have occurred.    Electronically signed by:  MD Dakota Ryan MD  10/08/24 0707       Dakota Spann MD  10/08/24 0708    "

## 2024-10-08 NOTE — ED NOTES
This RN along with security and other medical staff to assist pt to get into vehicle and pt ambulated with walker to car door with no difficulties.  Pt  had sudden onset of tremors to his legs and fell back striking head no LOC pt MD SEN witnessed fall and assessed pt.  Pt wheeled back to ER for further evaluation.     Linda James RN  10/08/24 2420

## 2024-10-08 NOTE — ASSESSMENT & PLAN NOTE
Potassium level 5.8 from missing dialysis session  Calcium gluconate, D10, regular insulin, albuterol nebulizer treatment and 80 mg of IV Lasix ordered by ER  Repeat lab work later today

## 2024-10-08 NOTE — CONSULTS
Consultation - Critical Care/ICU   Name: Naresh Carpio 64 y.o. male I MRN: [id number]  Unit/Bed#: ICU 04 I Date of Admission: 10/8/2024   Date of Service: 10/8/2024 I Hospital Day: 0   Consults  Physician Requesting Evaluation: Dave Orosco MD   Reason for Evaluation / Principal Problem: Stroke like symptoms       Assessment & Plan  Stroke-like symptoms  Patient completed dialysis with last known normal at 12:58  RRT was called when patient was found to have acute change in neuro status  NIH 8 for bilateral drift, impaired sensation, expressive and receptive aphasia   Stroke alert was called   Per chart review, it appears patient was prescribed Eliquis, though was not listed in PTA medication list on admission. Patient unable to provide additional information at this time  CT head without evidence of bleed > decision made to proceed with TNK  CT brain negative   CTA head/neck: Intracranial ICA atherosclerosis resulting in narrowing most prominent involving the proximal right supraclinoid segment (moderate to advanced     Plan:  MRI  Repeat CTH 24 hour post TNK  ECHO  PT/OT/ST  ESRD (end stage renal disease) (HCC)  HD on TTS  S/p iHD on 10/8  Nephrology consulted and following     Type 2 diabetes mellitus, without long-term current use of insulin (HCC)  Change to SSI + accu checks q6hr while NPO     Anemia  Likely anemia of chronic disease  Hemoglobin 10.2  Monitor     Status post fall  PT/ OT  Hyperkalemia  In setting of ESRD  S/p iHD today     PVD (peripheral vascular disease) ([facility])  Per chart review on Eliquis   Unclear last time patient took Eliquis     Disposition: Critical care    History of Present Illness   Naresh Carpio is a 64 y.o. who presents after fall. Patient has a past medical history of ESRD, PVD, anemia, hypertension and type 2 DM. Patient presented this morning after falling and sustaining an abrasion to his right knee. Patient was going to leave ? AMA, but then his friend refused to  transport him and patient began shaking and fell on the floor. Patient was subsequently admitted with hypertensive urgnecy and underwent dialysis.     After dialysis, patient was noted to have change in mental status. RRT was called. Upon my exam patient was noted to have bilateral drift in upper and lower extremity, impaired sensation and expressive and receptive aphasia. Stroke alert was called and patient was taken for CT head and CTA head/neck. Decision was made my Dr. Middleton to proceed with TNK. TNK administered at 1404.     Call placed to patients friend, Mercedes, but no answer at this time.     History obtained from chart review.  Review of Systems: See HPI for Review of Systems    Historical Information   Past Medical History:  No date: CKD  No date: Diabetes mellitus (HCC)  No date: Hyperlipidemia  No date: Hypertension  No date: Left toe amputee (HCC)  No date: TIA (transient ischemic attack) Past Surgical History:  No date: AMPUTATION; Left      Comment:  left foot resection 10 years ago dr tran  08/29/2023: IR LOWER EXTREMITY ANGIOGRAM  08/25/2023: IR TEMPORARY DIALYSIS CATHETER PLACEMENT  08/23/2023: IR TUNNELED CENTRAL LINE REMOVAL  01/27/2022: IR TUNNELED DIALYSIS CATHETER PLACEMENT  08/30/2023: IR TUNNELED DIALYSIS CATHETER PLACEMENT  8/31/2023: ME AMPUTATION METATARSAL W/TOE SINGLE; Right      Comment:  Procedure: RIGHT PARTIAL 1ST RAY RESECTION WITH  WOUND                VAC APPLICATION;  Surgeon: Won Parmar DPM;  Location:               [facility];  Service: Podiatry   Current Outpatient Medications   Medication Instructions    acetaminophen (TYLENOL) 650 mg, Oral, Every 6 hours PRN    allopurinol (ZYLOPRIM) 100 mg, Oral, Daily    atorvastatin (LIPITOR) 80 mg, Oral, Daily    docusate sodium (COLACE) 100 mg, Oral, 2 times daily    enoxaparin (LOVENOX) 30 mg, Subcutaneous, Every 24 hours scheduled    gabapentin (NEURONTIN) 200 mg, Oral, 2 times daily    mupirocin (BACTROBAN) 2 % ointment  Topical, 3 times daily    naloxone (NARCAN) 4 mg/0.1 mL nasal spray Administer 1 spray into a nostril. If no response after 2-3 minutes, give another dose in the other nostril using a new spray.    NIFEdipine (PROCARDIA XL) 30 mg, Oral, Daily after dinner    polyethylene glycol (MIRALAX) 17 g, Oral, Daily    sevelamer (RENAGEL) 800 mg, Oral, 3 times daily with meals    No Known Allergies   Social History     Tobacco Use    Smoking status: Never     Passive exposure: Never    Smokeless tobacco: Never   Vaping Use    Vaping status: Never Used   Substance Use Topics    Alcohol use: Not Currently     Alcohol/week: 0.0 standard drinks of alcohol     Comment: 0    Drug use: Never    History reviewed. No pertinent family history.       Objective :                   Vitals I/O      Most Recent Min/Max in 24hrs   Temp 99.5 °F (37.5 °C) Temp  Min: 97.5 °F (36.4 °C)  Max: 99.5 °F (37.5 °C)   Pulse 83 Pulse  Min: 72  Max: 98   Resp (!) 27 Resp  Min: 16  Max: 27   BP (!) 173/75 BP  Min: 109/88  Max: 218/94   O2 Sat 99 % SpO2  Min: 96 %  Max: 100 %      Intake/Output Summary (Last 24 hours) at 10/8/2024 1429  Last data filed at 10/8/2024 1228  Gross per 24 hour   Intake 750 ml   Output 4350 ml   Net -3600 ml       Diet NPO    Invasive Monitoring           Physical Exam   Physical Exam  Vitals reviewed.   Eyes:      Pupils: Pupils are equal, round, and reactive to light.   Skin:     General: Skin is warm and dry.   HENT:      Mouth/Throat:      Mouth: Mucous membranes are moist.   Cardiovascular:      Rate and Rhythm: Normal rate and regular rhythm.      Pulses: Normal pulses.   Musculoskeletal:      Right lower leg: Trace Edema present.      Left lower leg: Trace Edema present.   Abdominal:      Palpations: Abdomen is soft.   Constitutional:       General: He is awake.      Appearance: He is ill-appearing.   Pulmonary:      Effort: Pulmonary effort is normal.   Neurological:      Mental Status: He is agitated.      Sensory:  Sensory deficit present.      Motor: Pronator drift.      Coordination: Finger-Nose-Finger Test abnormal and Heel to Saxena Test abnormal.      Comments: Aphasia           Diagnostic Studies        Lab Results: I have reviewed the following results:     Medications:  Scheduled PRN   allopurinol, 100 mg, Daily  atorvastatin, 80 mg, Daily With Dinner  chlorhexidine, 15 mL, Q12H KEMAL  docusate sodium, 100 mg, BID  insulin lispro, 1-6 Units, Q6H KEMAL  mupirocin, , TID  tenecteplase, 25 mg, Once      acetaminophen, 650 mg, Q6H PRN  influenza vaccine, 0.5 mL, Once PRN  polyethylene glycol, 17 g, Daily PRN       Continuous          Labs:   CBC    Recent Labs     10/08/24  0216 10/08/24  0454   WBC 7.36 9.63   HGB 9.8* 10.2*   HCT 31.6* 32.3*    206     BMP    Recent Labs     10/08/24  0216 10/08/24  0454   SODIUM 138 138   K 5.8* 5.8*    106   CO2 19* 16*   AGAP 14* 16*   BUN 95* 94*   CREATININE 11.13* 11.08*   CALCIUM 8.1* 8.3*       Coags    No recent results     Additional Electrolytes  Recent Labs     10/08/24  0216   MG 2.3   PHOS 7.7*          Blood Gas    No recent results  No recent results LFTs  Recent Labs     10/08/24  0216 10/08/24  0454   ALT 37 39   AST 24 25   ALKPHOS 95 100   ALB 4.0 4.2   TBILI 0.45 0.52       Infectious  No recent results  Glucose  Recent Labs     10/08/24  0216 10/08/24  0454   GLUC 145* 139

## 2024-10-08 NOTE — ASSESSMENT & PLAN NOTE
Patient with some intermittent difficulty with speech and intermittent tremors which was noted on prior admission in March as well  CT head negative for acute pathology as noted above

## 2024-10-08 NOTE — Clinical Note
Date: 10/8/2024  Patient: Naresh Carpio  Admitted: 10/8/2024  1:37 AM  Attending Provider: Dakota Spann MD    Naresh Carpio or his authorized caregiver has made the decision for the patient to leave the emergency department against the advice  of his attending physician. He or his authorized caregiver has been informed and understands the inherent risks, including death, intracranial hemorrhage lab abnormality.  He or his authorized caregiver has decided to accept the responsibility for t his decision. Naresh Carpio and all necessary parties have been advised that he may return for further evaluation or treatment. His condition at time of discharge was stable.  Naresh Carpio had current vital signs as follows:  BP (!) 217/98 (BP Loc ation: Left arm)   Pulse 79   Temp 97.8 °F (36.6 °C) (Oral)   Resp 18   Wt 112 kg (245 lb 13 oz)

## 2024-10-08 NOTE — CONSULTS
NEPHROLOGY HOSPITAL CONSULTATION   Naresh Carpio 64 y.o. male MRN: [id number]  Unit/Bed#: [address] [place] [address] Encounter: [id number]    ASSESSMENT and PLAN:  Naresh Carpio is a 64 y.o. male who was admitted to [facility] after presenting with fall. A renal consultation is requested today for assistance in the management of ESRD on HD.    1.  ESRD on HD.  TTS at [facility].  Missed 1 week of dialysis.  Dialysis today.    2.  Access.  Right IJ permacath.    3.  Hyperkalemia.  2K bath on HD today.    4.  Anemia in CKD.  Hemoglobin today 10.2.  No indication for SURINDER.    5.  Anion gap metabolic acidosis.  35 mEq bicarbonate bath on HD today.    6.  Hypertension.  Home Rx: Nifedipine 30 mg daily.  Continue home Rx.    7.  Hyperphosphatemia.  Continue Renagel 1 tablet 3 times daily with meals.    Discussed with internal medicine team.  After discussion, we agreed to provide hemodialysis today.    HISTORY OF PRESENT ILLNESS:  Requesting Physician: Joel Dalton MD  Reason for Consult: ESRD on HD    Naresh Carpio is a 64 y.o. male who was admitted to [facility] after presenting with fall. A renal consultation is requested today for assistance in the management of ESRD on HD.  Patient was on his way to the bathroom and floor was wet and he slipped and fell and then started to have right knee pain.  He was admitted in July after a fall and noted to have bilateral S1 sacral fractures and right pelvic fracture discharged to rehab.  He was recently discharged from rehab 1 week ago and was supposed to be using a walker for ambulation.  Patient also missed dialysis since being home.    PAST MEDICAL HISTORY:  Past Medical History:   Diagnosis Date    CKD     Diabetes mellitus (HCC)     Hyperlipidemia     Hypertension     Left toe amputee (HCC)     TIA (transient ischemic attack)        PAST SURGICAL HISTORY:  Past Surgical History:   Procedure Laterality Date    AMPUTATION Left     left foot  resection 10 years ago dr tran    IR LOWER EXTREMITY ANGIOGRAM  08/29/2023    IR TEMPORARY DIALYSIS CATHETER PLACEMENT  08/25/2023    IR TUNNELED CENTRAL LINE REMOVAL  08/23/2023    IR TUNNELED DIALYSIS CATHETER PLACEMENT  01/27/2022    IR TUNNELED DIALYSIS CATHETER PLACEMENT  08/30/2023    NM AMPUTATION METATARSAL W/TOE SINGLE Right 8/31/2023    Procedure: RIGHT PARTIAL 1ST RAY RESECTION WITH  WOUND VAC APPLICATION;  Surgeon: Won Parmar DPM;  Location: [facility];  Service: Podiatry       ALLERGIES:  No Known Allergies    SOCIAL HISTORY:  Social History     Substance and Sexual Activity   Alcohol Use Not Currently    Alcohol/week: 0.0 standard drinks of alcohol    Comment: 0     Social History     Substance and Sexual Activity   Drug Use Never     Social History     Tobacco Use   Smoking Status Never    Passive exposure: Never   Smokeless Tobacco Never       FAMILY HISTORY:  History reviewed. No pertinent family history.    MEDICATIONS:    Current Facility-Administered Medications:     influenza vaccine, high-dose (Fluzone High-Dose) IM injection 0.5 mL, 0.5 mL, Intramuscular, Once PRN, Joel Dalton MD    insulin lispro (HumALOG/ADMELOG) 100 units/mL subcutaneous injection 1-5 Units, 1-5 Units, Subcutaneous, TID AC **AND** Fingerstick Glucose (POCT), , , TID AC, Lore Silver DO    oxyCODONE (ROXICODONE) IR tablet 5 mg, 5 mg, Oral, Q6H PRN, Joel Dalton MD, 5 mg at 10/08/24 0739    REVIEW OF SYSTEMS:  Review of Systems   Constitutional:  Negative for chills and fever.   HENT:  Negative for ear pain and sore throat.    Eyes:  Negative for pain and visual disturbance.   Respiratory:  Negative for cough and shortness of breath.    Cardiovascular:  Negative for chest pain and palpitations.   Gastrointestinal:  Negative for abdominal pain and vomiting.   Genitourinary:  Negative for dysuria and hematuria.   Musculoskeletal:  Negative for arthralgias and back pain.   Skin:  Negative for color change  "and rash.   Neurological:  Negative for seizures and syncope.   All other systems reviewed and are negative.    PHYSICAL EXAM:  Current Weight: Weight - Scale: 112 kg (245 lb 13 oz)  First Weight: Weight - Scale: 112 kg (245 lb 13 oz)  Vitals:    10/08/24 0600 10/08/24 0615 10/08/24 0625 10/08/24 0725   BP: (!) 208/83 (!) 199/86 (!) 178/60 134/95   BP Location: Right arm Right arm Right arm    Pulse: 72 76 98 88   Resp: 21 16 18    Temp:    97.5 °F (36.4 °C)   TempSrc:       SpO2: 97% 100% 99% 98%   Weight:           Intake/Output Summary (Last 24 hours) at 10/8/2024 0752  Last data filed at 10/8/2024 0624  Gross per 24 hour   Intake 250 ml   Output --   Net 250 ml     Physical Exam  Constitutional:       Appearance: Normal appearance.   HENT:      Head: Normocephalic and atraumatic.   Cardiovascular:      Rate and Rhythm: Normal rate and regular rhythm.      Comments: Right chest wall permacath present  Pulmonary:      Effort: Pulmonary effort is normal.      Breath sounds: Normal breath sounds.   Musculoskeletal:         General: Normal range of motion.      Right lower leg: Edema present.      Left lower leg: Edema present.   Skin:     General: Skin is warm.   Neurological:      Mental Status: He is alert and oriented to person, place, and time. Mental status is at baseline.   Psychiatric:         Mood and Affect: Mood normal.       Lab Results:   Results from last 7 days   Lab Units 10/08/24  0454 10/08/24  0216   WBC Thousand/uL 9.63 7.36   HEMOGLOBIN g/dL 10.2* 9.8*   HEMATOCRIT % 32.3* 31.6*   PLATELETS Thousands/uL 206 192   POTASSIUM mmol/L 5.8* 5.8*   CHLORIDE mmol/L 106 105   CO2 mmol/L 16* 19*   BUN mg/dL 94* 95*   CREATININE mg/dL 11.08* 11.13*   CALCIUM mg/dL 8.3* 8.1*   MAGNESIUM mg/dL  --  2.3   PHOSPHORUS mg/dL  --  7.7*   ALK PHOS U/L 100 95   ALT U/L 39 37   AST U/L 25 24     Portions of the record may have been created with voice recognition software. Occasional wrong word or \"sound a like\" " substitutions may have occurred due to the inherent limitations of voice recognition software. Read the chart carefully and recognize, using context, where substitutions have occurred. If you have any questions, please contact the dictating provider.

## 2024-10-08 NOTE — ASSESSMENT & PLAN NOTE
Lab Results   Component Value Date    EGFR 4 10/08/2024    EGFR 4 10/08/2024    EGFR 5 07/06/2024    CREATININE 11.08 (H) 10/08/2024    CREATININE 11.13 (H) 10/08/2024    CREATININE 9.00 (H) 07/06/2024     Patient gets dialysis Tuesday, Thursday, Saturday and has missed dialysis for about a week since being discharged from short-term rehab.  Patient scheduled for dialysis today at a different facility but family stated that they cannot transport him for his appointments  Creatinine 11 today.  Continue Renagel  Consult nephrology for dialysis

## 2024-10-08 NOTE — QUICK NOTE
Patient has attempted multiple times to urinate without success.  He is restless and uncomfortable with pain.  Bladder scan done showing 750 mL.  Patient states despite being on dialysis, he does urinate at home.  On exam, he is literally rolling side-to-side in bed saying that he is in pain and has to pee but cannot.  Discussed with on-call ICU attending Dr. Blakely who is in agreement to place Arguelles catheter based on bladder scan results in the setting of TNK to avoid multiple straight cath procedures overnight.  Will place Arguelles despite patient receiving TNK earlier this afternoon.

## 2024-10-08 NOTE — RAPID RESPONSE
Rapid Response Note  Naresh Carpio 64 y.o. male MRN: [id number]  Unit/Bed#: ICU 04 Encounter: [id number]    Rapid Response Notification(s):   Response called date/time:  10/8/2024 1:10 PM  Response team arrival date/time:  10/8/2024 1:12 PM  Response end date/time:  10/8/2024 1:25 PM  Level of care:  Medsur  [facility]  Primary reason for rapid response call:  Acute change in neuro status    Rapid Response Intervention(s):   Airway:  None  Breathing:  None  Circulation:  None  Fluids administered:  None  Medications administered:  None       Assessment:   Stroke like symptoms     Plan:   STROKE alert:  STAT Ct head  STAT CTA head/neck  STAT MRI consult      Rapid Response Outcome:   Transfer:  Transfer to ICU  Primary service notified of transfer: Yes    Code Status: Level 1 (Full Code)      Family notified: number listed in chart went to voicemail. Will attempt again later today.      Background/Situation:   Naresh Carpio is a 64 y.o. male who presented this morning after a fall. Underwent dialysis today after reportedly missing for about a week. After diaylsis patient was noted to have acute change in neuro status. RRT was called which then led to stroke alert after assessment. Case was discussed with Dr. Middleton, it was noted that patient has listed Eliquis in care everywhere but was not listed on PTA medications here. Decision made to proceed with stroke alert and administer TNK if no bleed noted on CT imaging. See consult note from today for more information.     Review of Systems   Unable to perform ROS: Other       Objective:   Vitals:    10/08/24 1312 10/08/24 1315 10/08/24 1317 10/08/24 1321   BP:       BP Location:       Pulse: 83 80 85 83   Resp:       Temp:       TempSrc:       SpO2: 98% 98% 98% 97%   Weight:         Physical Exam  Vitals reviewed.   Constitutional:       Appearance: He is ill-appearing.   HENT:      Mouth/Throat:      Mouth: Mucous membranes are moist.    Eyes:      Pupils: Pupils are equal, round, and reactive to light.   Cardiovascular:      Rate and Rhythm: Normal rate and regular rhythm.      Pulses: Normal pulses.   Pulmonary:      Effort: Pulmonary effort is normal.   Abdominal:      Palpations: Abdomen is soft.   Skin:     General: Skin is warm and dry.   Neurological:      Mental Status: He is alert.      Sensory: Sensory deficit present.      Motor: Tremor present.      Coordination: Coordination abnormal. Finger-Nose-Finger Test abnormal and Heel to Saxena Test abnormal.      Comments: Expressive and receptive asphasia

## 2024-10-08 NOTE — NURSING NOTE
This RN was notified by Xochitl Christensen RN that patient was having speech difficulties during leader rounds. This RN came to assess patient, noted change in mental status from baseline prior to dialysis this AM. RRT called at 1310, stroke alert called at 1314. Patient transported to CT by this RN and student nurse on monitor. Patient transferred to ICU by this RN and student nurse following CT scan. Bedside report given to Tressa RAWLS and Niharika RN in ICU.

## 2024-10-08 NOTE — ASSESSMENT & PLAN NOTE
Patient completed dialysis with last known normal at 12:58  RRT was called when patient was found to have acute change in neuro status  NIH 8 for bilateral drift, impaired sensation, expressive and receptive aphasia   Stroke alert was called   Per chart review, it appears patient was prescribed Eliquis, though was not listed in PTA medication list on admission. Patient unable to provide additional information at this time  CT head without evidence of bleed > decision made to proceed with TNK  CT brain negative   CTA head/neck: Intracranial ICA atherosclerosis resulting in narrowing most prominent involving the proximal right supraclinoid segment (moderate to advanced     Plan:  MRI  Repeat CTH 24 hour post TNK  ECHO  PT/OT/ST

## 2024-10-08 NOTE — ASSESSMENT & PLAN NOTE
Lab Results   Component Value Date    HGBA1C 6.2 (H) 07/02/2024       Recent Labs     10/08/24  0145   POCGLU 174*     Diet controlled.  While here will place patient on Accu-Cheks with SSI

## 2024-10-09 ENCOUNTER — APPOINTMENT (INPATIENT)
Dept: RADIOLOGY | Facility: HOSPITAL | Age: 64
DRG: 640 | End: 2024-10-09
Payer: MEDICARE

## 2024-10-09 ENCOUNTER — APPOINTMENT (OUTPATIENT)
Dept: NON INVASIVE DIAGNOSTICS | Facility: HOSPITAL | Age: 64
DRG: 640 | End: 2024-10-09
Payer: MEDICARE

## 2024-10-09 DIAGNOSIS — I65.21 ATHEROSCLEROSIS OF RIGHT CAROTID ARTERY: Primary | ICD-10-CM

## 2024-10-09 LAB
ABO GROUP BLD: NORMAL
ANION GAP SERPL CALCULATED.3IONS-SCNC: 10 MMOL/L (ref 4–13)
AORTIC ROOT: 3.8 CM
APICAL FOUR CHAMBER EJECTION FRACTION: 66 %
BASOPHILS # BLD AUTO: 0.02 THOUSANDS/ΜL (ref 0–0.1)
BASOPHILS # BLD AUTO: 0.02 THOUSANDS/ΜL (ref 0–0.1)
BASOPHILS NFR BLD AUTO: 0 % (ref 0–1)
BASOPHILS NFR BLD AUTO: 0 % (ref 0–1)
BLD GP AB SCN SERPL QL: NEGATIVE
BSA FOR ECHO PROCEDURE: 2.27 M2
BUN SERPL-MCNC: 45 MG/DL (ref 5–25)
CALCIUM SERPL-MCNC: 7.5 MG/DL (ref 8.4–10.2)
CHLORIDE SERPL-SCNC: 98 MMOL/L (ref 96–108)
CHOLEST SERPL-MCNC: 124 MG/DL
CO2 SERPL-SCNC: 23 MMOL/L (ref 21–32)
CREAT SERPL-MCNC: 7.12 MG/DL (ref 0.6–1.3)
E WAVE DECELERATION TIME: 248 MS
E/A RATIO: 0.57
EOSINOPHIL # BLD AUTO: 0.25 THOUSAND/ΜL (ref 0–0.61)
EOSINOPHIL # BLD AUTO: 0.27 THOUSAND/ΜL (ref 0–0.61)
EOSINOPHIL NFR BLD AUTO: 4 % (ref 0–6)
EOSINOPHIL NFR BLD AUTO: 5 % (ref 0–6)
ERYTHROCYTE [DISTWIDTH] IN BLOOD BY AUTOMATED COUNT: 17.2 % (ref 11.6–15.1)
ERYTHROCYTE [DISTWIDTH] IN BLOOD BY AUTOMATED COUNT: 17.3 % (ref 11.6–15.1)
FRACTIONAL SHORTENING: 31 (ref 28–44)
GFR SERPL CREATININE-BSD FRML MDRD: 7 ML/MIN/1.73SQ M
GLUCOSE SERPL-MCNC: 106 MG/DL (ref 65–140)
GLUCOSE SERPL-MCNC: 111 MG/DL (ref 65–140)
GLUCOSE SERPL-MCNC: 114 MG/DL (ref 65–140)
GLUCOSE SERPL-MCNC: 123 MG/DL (ref 65–140)
GLUCOSE SERPL-MCNC: 89 MG/DL (ref 65–140)
HCT VFR BLD AUTO: 21 % (ref 36.5–49.3)
HCT VFR BLD AUTO: 21.4 % (ref 36.5–49.3)
HCT VFR BLD AUTO: 22 % (ref 36.5–49.3)
HDLC SERPL-MCNC: 37 MG/DL
HGB BLD-MCNC: 6.7 G/DL (ref 12–17)
HGB BLD-MCNC: 6.9 G/DL (ref 12–17)
HGB BLD-MCNC: 6.9 G/DL (ref 12–17)
IMM GRANULOCYTES # BLD AUTO: 0.01 THOUSAND/UL (ref 0–0.2)
IMM GRANULOCYTES # BLD AUTO: 0.02 THOUSAND/UL (ref 0–0.2)
IMM GRANULOCYTES NFR BLD AUTO: 0 % (ref 0–2)
IMM GRANULOCYTES NFR BLD AUTO: 0 % (ref 0–2)
INTERVENTRICULAR SEPTUM IN DIASTOLE (PARASTERNAL SHORT AXIS VIEW): 1 CM
INTERVENTRICULAR SEPTUM: 1 CM (ref 0.6–1.1)
LAAS-AP2: 33.2 CM2
LAAS-AP4: 29.3 CM2
LDLC SERPL CALC-MCNC: 70 MG/DL (ref 0–100)
LEFT ATRIUM SIZE: 4.8 CM
LEFT ATRIUM VOLUME (MOD BIPLANE): 125 ML
LEFT ATRIUM VOLUME INDEX (MOD BIPLANE): 55.1 ML/M2
LEFT INTERNAL DIMENSION IN SYSTOLE: 3.4 CM (ref 2.1–4)
LEFT VENTRICULAR INTERNAL DIMENSION IN DIASTOLE: 4.9 CM (ref 3.5–6)
LEFT VENTRICULAR POSTERIOR WALL IN END DIASTOLE: 1.3 CM
LEFT VENTRICULAR STROKE VOLUME: 65 ML
LVSV (TEICH): 65 ML
LYMPHOCYTES # BLD AUTO: 0.96 THOUSANDS/ΜL (ref 0.6–4.47)
LYMPHOCYTES # BLD AUTO: 1.03 THOUSANDS/ΜL (ref 0.6–4.47)
LYMPHOCYTES NFR BLD AUTO: 17 % (ref 14–44)
LYMPHOCYTES NFR BLD AUTO: 18 % (ref 14–44)
MAGNESIUM SERPL-MCNC: 1.9 MG/DL (ref 1.9–2.7)
MCH RBC QN AUTO: 29.4 PG (ref 26.8–34.3)
MCH RBC QN AUTO: 29.6 PG (ref 26.8–34.3)
MCHC RBC AUTO-ENTMCNC: 31.9 G/DL (ref 31.4–37.4)
MCHC RBC AUTO-ENTMCNC: 32.2 G/DL (ref 31.4–37.4)
MCV RBC AUTO: 92 FL (ref 82–98)
MCV RBC AUTO: 92 FL (ref 82–98)
MONOCYTES # BLD AUTO: 0.72 THOUSAND/ΜL (ref 0.17–1.22)
MONOCYTES # BLD AUTO: 0.73 THOUSAND/ΜL (ref 0.17–1.22)
MONOCYTES NFR BLD AUTO: 13 % (ref 4–12)
MONOCYTES NFR BLD AUTO: 13 % (ref 4–12)
MRSA NOSE QL CULT: NORMAL
MV E'TISSUE VEL-LAT: 11 CM/S
MV E'TISSUE VEL-SEP: 7 CM/S
MV PEAK A VEL: 1.06 M/S
MV PEAK E VEL: 60 CM/S
MV STENOSIS PRESSURE HALF TIME: 72 MS
MV VALVE AREA P 1/2 METHOD: 3.06
NEUTROPHILS # BLD AUTO: 3.61 THOUSANDS/ΜL (ref 1.85–7.62)
NEUTROPHILS # BLD AUTO: 3.77 THOUSANDS/ΜL (ref 1.85–7.62)
NEUTS SEG NFR BLD AUTO: 64 % (ref 43–75)
NEUTS SEG NFR BLD AUTO: 66 % (ref 43–75)
NRBC BLD AUTO-RTO: 0 /100 WBCS
NRBC BLD AUTO-RTO: 0 /100 WBCS
PLATELET # BLD AUTO: 158 THOUSANDS/UL (ref 149–390)
PLATELET # BLD AUTO: 169 THOUSANDS/UL (ref 149–390)
PMV BLD AUTO: 10.2 FL (ref 8.9–12.7)
PMV BLD AUTO: 9.8 FL (ref 8.9–12.7)
POTASSIUM SERPL-SCNC: 4.7 MMOL/L (ref 3.5–5.3)
RBC # BLD AUTO: 2.28 MILLION/UL (ref 3.88–5.62)
RBC # BLD AUTO: 2.33 MILLION/UL (ref 3.88–5.62)
RH BLD: POSITIVE
RIGHT ATRIUM AREA SYSTOLE A4C: 14.8 CM2
RIGHT VENTRICLE ID DIMENSION: 4.3 CM
SL CV LEFT ATRIUM LENGTH A2C: 5.9 CM
SL CV LV EF: 60
SL CV PED ECHO LEFT VENTRICLE DIASTOLIC VOLUME (MOD BIPLANE) 2D: 114 ML
SL CV PED ECHO LEFT VENTRICLE SYSTOLIC VOLUME (MOD BIPLANE) 2D: 49 ML
SODIUM SERPL-SCNC: 131 MMOL/L (ref 135–147)
SPECIMEN EXPIRATION DATE: NORMAL
TR MAX PG: 24 MMHG
TR PEAK VELOCITY: 2.5 M/S
TRICUSPID ANNULAR PLANE SYSTOLIC EXCURSION: 2.1 CM
TRICUSPID VALVE PEAK REGURGITATION VELOCITY: 2.47 M/S
TRIGL SERPL-MCNC: 85 MG/DL
WBC # BLD AUTO: 5.67 THOUSAND/UL (ref 4.31–10.16)
WBC # BLD AUTO: 5.74 THOUSAND/UL (ref 4.31–10.16)

## 2024-10-09 PROCEDURE — 80048 BASIC METABOLIC PNL TOTAL CA: CPT

## 2024-10-09 PROCEDURE — 92610 EVALUATE SWALLOWING FUNCTION: CPT

## 2024-10-09 PROCEDURE — 99233 SBSQ HOSP IP/OBS HIGH 50: CPT | Performed by: INTERNAL MEDICINE

## 2024-10-09 PROCEDURE — 93306 TTE W/DOPPLER COMPLETE: CPT

## 2024-10-09 PROCEDURE — 82948 REAGENT STRIP/BLOOD GLUCOSE: CPT

## 2024-10-09 PROCEDURE — 86900 BLOOD TYPING SEROLOGIC ABO: CPT

## 2024-10-09 PROCEDURE — 70551 MRI BRAIN STEM W/O DYE: CPT

## 2024-10-09 PROCEDURE — 99214 OFFICE O/P EST MOD 30 MIN: CPT | Performed by: INTERNAL MEDICINE

## 2024-10-09 PROCEDURE — 86850 RBC ANTIBODY SCREEN: CPT

## 2024-10-09 PROCEDURE — 99215 OFFICE O/P EST HI 40 MIN: CPT | Performed by: PSYCHIATRY & NEUROLOGY

## 2024-10-09 PROCEDURE — 85018 HEMOGLOBIN: CPT | Performed by: NURSE PRACTITIONER

## 2024-10-09 PROCEDURE — 93306 TTE W/DOPPLER COMPLETE: CPT | Performed by: INTERNAL MEDICINE

## 2024-10-09 PROCEDURE — 85025 COMPLETE CBC W/AUTO DIFF WBC: CPT

## 2024-10-09 PROCEDURE — 86923 COMPATIBILITY TEST ELECTRIC: CPT

## 2024-10-09 PROCEDURE — 86901 BLOOD TYPING SEROLOGIC RH(D): CPT

## 2024-10-09 PROCEDURE — 85014 HEMATOCRIT: CPT | Performed by: NURSE PRACTITIONER

## 2024-10-09 RX ORDER — ASPIRIN 325 MG
325 TABLET ORAL DAILY
Status: DISCONTINUED | OUTPATIENT
Start: 2024-10-09 | End: 2024-10-09

## 2024-10-09 RX ORDER — SODIUM HYPOCHLORITE 2.5 MG/ML
1 SOLUTION TOPICAL DAILY
Status: DISCONTINUED | OUTPATIENT
Start: 2024-10-10 | End: 2024-10-22 | Stop reason: HOSPADM

## 2024-10-09 RX ORDER — ATORVASTATIN CALCIUM 40 MG/1
80 TABLET, FILM COATED ORAL ONCE
Status: COMPLETED | OUTPATIENT
Start: 2024-10-09 | End: 2024-10-09

## 2024-10-09 RX ORDER — DEXMEDETOMIDINE HYDROCHLORIDE 4 UG/ML
.1-.7 INJECTION, SOLUTION INTRAVENOUS
Status: DISCONTINUED | OUTPATIENT
Start: 2024-10-09 | End: 2024-10-09

## 2024-10-09 RX ORDER — ATORVASTATIN CALCIUM 40 MG/1
40 TABLET, FILM COATED ORAL
Status: DISCONTINUED | OUTPATIENT
Start: 2024-10-10 | End: 2024-10-22 | Stop reason: HOSPADM

## 2024-10-09 RX ORDER — INSULIN LISPRO 100 [IU]/ML
1-6 INJECTION, SOLUTION INTRAVENOUS; SUBCUTANEOUS
Status: DISCONTINUED | OUTPATIENT
Start: 2024-10-09 | End: 2024-10-22 | Stop reason: HOSPADM

## 2024-10-09 RX ADMIN — DEXMEDETOMIDINE HYDROCHLORIDE 0.2 MCG/KG/HR: 4 INJECTION, SOLUTION INTRAVENOUS at 13:18

## 2024-10-09 RX ADMIN — DOCUSATE SODIUM 100 MG: 100 CAPSULE, LIQUID FILLED ORAL at 18:11

## 2024-10-09 RX ADMIN — ACETAMINOPHEN 1000 MG: 10 INJECTION INTRAVENOUS at 03:51

## 2024-10-09 RX ADMIN — ATORVASTATIN CALCIUM 80 MG: 40 TABLET, FILM COATED ORAL at 18:10

## 2024-10-09 RX ADMIN — MUPIROCIN: 20 OINTMENT TOPICAL at 10:24

## 2024-10-09 RX ADMIN — ACETAMINOPHEN 1000 MG: 10 INJECTION INTRAVENOUS at 10:42

## 2024-10-09 RX ADMIN — DOCUSATE SODIUM 100 MG: 100 CAPSULE, LIQUID FILLED ORAL at 08:35

## 2024-10-09 RX ADMIN — ALLOPURINOL 100 MG: 100 TABLET ORAL at 08:35

## 2024-10-09 RX ADMIN — MUPIROCIN: 20 OINTMENT TOPICAL at 18:11

## 2024-10-09 RX ADMIN — CHLORHEXIDINE GLUCONATE 15 ML: 1.2 RINSE ORAL at 20:28

## 2024-10-09 RX ADMIN — CHLORHEXIDINE GLUCONATE 15 ML: 1.2 RINSE ORAL at 08:35

## 2024-10-09 NOTE — SPEECH THERAPY NOTE
Speech-Language Pathology Bedside Swallow Evaluation      Patient Name: Naresh Carpio    Today's Date: 10/9/2024     Problem List  Principal Problem:    Stroke-like symptoms  Active Problems:    ESRD (end stage renal disease) (HCC)    Type 2 diabetes mellitus, without long-term current use of insulin (HCC)    Anemia    Status post fall    Hyperkalemia    PVD (peripheral vascular disease) (HCC)      Past Medical History  Past Medical History:   Diagnosis Date    CKD     Diabetes mellitus (HCC)     Hyperlipidemia     Hypertension     Left toe amputee (HCC)     TIA (transient ischemic attack)        Past Surgical History  Past Surgical History:   Procedure Laterality Date    AMPUTATION Left     left foot resection 10 years ago dr tran    IR LOWER EXTREMITY ANGIOGRAM  08/29/2023    IR TEMPORARY DIALYSIS CATHETER PLACEMENT  08/25/2023    IR TUNNELED CENTRAL LINE REMOVAL  08/23/2023    IR TUNNELED DIALYSIS CATHETER PLACEMENT  01/27/2022    IR TUNNELED DIALYSIS CATHETER PLACEMENT  08/30/2023    MS AMPUTATION METATARSAL W/TOE SINGLE Right 8/31/2023    Procedure: RIGHT PARTIAL 1ST RAY RESECTION WITH  WOUND VAC APPLICATION;  Surgeon: Won Parmar DPM;  Location: [facility];  Service: Podiatry       Summary   Pt presented with functional appearing oral and pharyngeal stage swallowing skills with materials administered today.    Recommended Diet: regular textured food and thin liquids   Recommended Form of Meds: whole with liquid   Aspiration precautions and swallowing strategies: only allow feeding when fully alert  Other Recommendations: Await MRI, PT and OT evaluations, r/o need additional cogntiive/language evaluation, Continue oral care        Current Medical Status  Patient is a 64-year-old male with end-stage renal disease, HTN, HLD, DM, TIAs and nonadherence who was discharged from SNF facility approximately 1 week prior to his hospitalization. Patient was admitted after having hypertension, swelling and traumatic  "fall with head strike.  Initial CT of the head post head strike was negative for any bleed and/or evidence of acute ischemic event.  Patient was admitted after given medications for hypertension and fluid overload.  He was on cardiac stepdown unit and received dialysis.  Postdialysis patient had onset of confusion, difficulties with expressive and receptive aphasia.  He had decreased sensation throughout with positive tremor and ataxia on exam.  Patient was a stroke alert at that time, sent for repeat CT.  CT of the head was negative for any acute findings.  CTA of the head and neck with some mild cervical ICA/VA stenosis, intracranial vessels with moderate to advanced stenosis in the right proximal/supraclinoid ICA.  Moderate cavernous/supraclinoid segment stenosis with bilateral V4 scattered calcifications noted.   MRI is tentatively scheduled for 2:45 PM.  Suspect the possibility of DDS, dialysis disequilibrium syndrome due to his missed dialysis treatments with significant change in creatinine postdialysis however given patient's medication nonadherence, hypertension, swelling and neurological changes, could not completely rule out the possibility of ischemic event.  Speech/Language deficits reportedly resolving.   SLP Swallowing Evaluation requested and completed bedside.     Current Precautions:  Fall, Aspiration, Contact    Allergies:  No known food allergies    Past medical history:  Please see H&P for details    Special Studies:  MRI of brain pending    Social/Education/Vocational Hx:  CM to explore.  Sisters SO reported that pt is without a home (\"staying different places\")    Swallow Information   Current Diet: NPO   Baseline Diet: regular diet and thin liquids      Baseline Assessment   Behavior/Cognition: alert  Speech/Language Status: able to participate in conversation  Patient Positioning: upright in bed     Swallow Mechanism Exam  Facial: symmetrical  Labial: WFL  Lingual: WFL  Velum: " symmetrical  Mandible: adequate ROM  Dentition: adequate  Vocal quality:clear/adequate   Respiratory Status: on RA       Consistencies Assessed and Performance   Consistencies Administered: thin liquids, puree, soft solids, and hard solids  Materials administered included water, applesauce, fig bar and 1/2 turkey sandwich    Oral Stage:   Mastication was adequate with the materials administered today.  Bolus formation and transfer were functional with no significant oral residue noted.  No overt s/s reduced oral control.    Pharyngeal Stage:   Swallow Mechanics:  Swallowing initiation appeared prompt.  Laryngeal rise was palpated and judged to be within functional limits.  No coughing, throat clearing, change in vocal quality or respiratory status noted today.     Esophageal Concerns: none reported    Summary and Recommendations (see above)    Results Reviewed with: patient and CRNP      Recommended: Await MRI, no dysphagia tx.

## 2024-10-09 NOTE — QUICK NOTE
Hemoglobin result as 6.9. Blood was re-drawn to verify lab value and resulted at 6.7.     Aspirin was ordered by neurology per stroke pathway, but discussed with Dr. Middleton the drop in hemoglobin and agreed to hold at this time.     I also ordered type and screen and informed patient of concern for possible bleed. I explained his lab values and that he is currently hemodynamically stable and does not have evidence of acute bleed, but I could not guarantee that. I recommended to transfuse 1U PRBC, but patient is REFUSING at this time. I informed him, that if he is acutely bleeding and continues to lose blood or becomes hemodynamically unstable, this could ultimately lead to death. Patient acknowledged with understanding and agrees to re-discuss if he clinically worsens.     I also placed a call to patients sister, Alicja. I informed her of the above. She is aware patient is refusing blood at this time. She requests additional updates if patient were to clinically worsen.

## 2024-10-09 NOTE — OCCUPATIONAL THERAPY NOTE
OCCUPATIONAL THERAPY       10/09/24 1359   OT Last Visit   OT Visit Date 10/09/24   Note Type   Note type Cancelled Session   Cancel Reasons Other   Additional Comments Sedated- going to MRI soon. Will continue to follow.   Licensure   [place] License Number  Farheen Conklin MS, OTR/L, #[id number]

## 2024-10-09 NOTE — PLAN OF CARE
Problem: Potential for Falls  Goal: Patient will remain free of falls  Description: INTERVENTIONS:  - Educate patient/family on patient safety including physical limitations  - Instruct patient to call for assistance with activity   - Consult OT/PT to assist with strengthening/mobility   - Keep Call bell within reach  - Keep bed low and locked with side rails adjusted as appropriate  - Keep care items and personal belongings within reach  - Initiate and maintain comfort rounds  - Make Fall Risk Sign visible to staff  - Offer Toileting every 2 Hours, in advance of need  - Initiate/Maintain bed alarm  - Obtain necessary fall risk management equipment: fall risk sign on door  - Apply yellow socks and bracelet for high fall risk patients  - Consider moving patient to room near nurses station  Outcome: Progressing     Problem: PAIN - ADULT  Goal: Verbalizes/displays adequate comfort level or baseline comfort level  Description: Interventions:  - Encourage patient to monitor pain and request assistance  - Assess pain using appropriate pain scale  - Administer analgesics based on type and severity of pain and evaluate response  - Implement non-pharmacological measures as appropriate and evaluate response  - Consider cultural and social influences on pain and pain management  - Notify physician/advanced practitioner if interventions unsuccessful or patient reports new pain  Outcome: Progressing

## 2024-10-09 NOTE — PLAN OF CARE
Problem: Potential for Falls  Goal: Patient will remain free of falls  Description: INTERVENTIONS:  - Educate patient/family on patient safety including physical limitations  - Instruct patient to call for assistance with activity   - Consult OT/PT to assist with strengthening/mobility   - Keep Call bell within reach  - Keep bed low and locked with side rails adjusted as appropriate  - Keep care items and personal belongings within reach  - Initiate and maintain comfort rounds  - Make Fall Risk Sign visible to staff  - Offer Toileting every 2 Hours, in advance of need  - Initiate/Maintain bed alarm  - Obtain necessary fall risk management equipment: fall risk sign on door  - Apply yellow socks and bracelet for high fall risk patients  - Consider moving patient to room near nurses station  Outcome: Progressing     Problem: METABOLIC, FLUID AND ELECTROLYTES - ADULT  Goal: Electrolytes maintained within normal limits  Description: INTERVENTIONS:  - Monitor labs and assess patient for signs and symptoms of electrolyte imbalances  - Administer electrolyte replacement as ordered  - Monitor response to electrolyte replacements, including repeat lab results as appropriate  - Instruct patient on fluid and nutrition as appropriate  Outcome: Progressing  Goal: Fluid balance maintained  Description: INTERVENTIONS:  - Monitor labs   - Monitor I/O and WT  - Instruct patient on fluid and nutrition as appropriate  - Assess for signs & symptoms of volume excess or deficit  Outcome: Progressing     Problem: Prexisting or High Potential for Compromised Skin Integrity  Goal: Skin integrity is maintained or improved  Description: INTERVENTIONS:  - Identify patients at risk for skin breakdown  - Assess and monitor skin integrity  - Assess and monitor nutrition and hydration status  - Monitor labs   - Assess for incontinence   - Turn and reposition patient  - Assist with mobility/ambulation  - Relieve pressure over bony prominences  -  Avoid friction and shearing  - Provide appropriate hygiene as needed including keeping skin clean and dry  - Evaluate need for skin moisturizer/barrier cream  - Collaborate with interdisciplinary team   - Patient/family teaching  - Consider wound care consult   Outcome: Progressing     Problem: PAIN - ADULT  Goal: Verbalizes/displays adequate comfort level or baseline comfort level  Description: Interventions:  - Encourage patient to monitor pain and request assistance  - Assess pain using appropriate pain scale  - Administer analgesics based on type and severity of pain and evaluate response  - Implement non-pharmacological measures as appropriate and evaluate response  - Consider cultural and social influences on pain and pain management  - Notify physician/advanced practitioner if interventions unsuccessful or patient reports new pain  Outcome: Progressing     Problem: INFECTION - ADULT  Goal: Absence or prevention of progression during hospitalization  Description: INTERVENTIONS:  - Assess and monitor for signs and symptoms of infection  - Monitor lab/diagnostic results  - Monitor all insertion sites, i.e. indwelling lines, tubes, and drains  - Administer medications as ordered  - Instruct and encourage patient and family to use good hand hygiene technique  - Identify and instruct in appropriate isolation precautions for identified infection/condition  Outcome: Progressing  Goal: Absence of fever/infection during neutropenic period  Description: INTERVENTIONS:  - Monitor WBC    Outcome: Progressing     Problem: SAFETY ADULT  Goal: Patient will remain free of falls  Description: INTERVENTIONS:  - Educate patient/family on patient safety including physical limitations  - Instruct patient to call for assistance with activity   - Consult OT/PT to assist with strengthening/mobility   - Keep Call bell within reach  - Keep bed low and locked with side rails adjusted as appropriate  - Keep care items and personal  belongings within reach  - Initiate and maintain comfort rounds  - Make Fall Risk Sign visible to staff  - Offer Toileting every 2 Hours, in advance of need  - Initiate/Maintain bed alarm  - Obtain necessary fall risk management equipment: fall risk sign on door  - Apply yellow socks and bracelet for high fall risk patients  - Consider moving patient to room near nurses station  Outcome: Progressing  Goal: Maintain or return to baseline ADL function  Description: INTERVENTIONS:  -  Assess patient's ability to carry out ADLs; assess patient's baseline for ADL function and identify physical deficits which impact ability to perform ADLs (bathing, care of mouth/teeth, toileting, grooming, dressing, etc.)  - Assess/evaluate cause of self-care deficits   - Assess range of motion  - Assess patient's mobility; develop plan if impaired  - Assess patient's need for assistive devices and provide as appropriate  - Encourage maximum independence but intervene and supervise when necessary  - Involve family in performance of ADLs  - Assess for home care needs following discharge   - Consider OT consult to assist with ADL evaluation and planning for discharge  - Provide patient education as appropriate  Outcome: Progressing  Goal: Maintains/Returns to pre admission functional level  Description: INTERVENTIONS:  - Perform AM-PAC 6 Click Basic Mobility/ Daily Activity assessment daily.  - Set and communicate daily mobility goal to care team and patient/family/caregiver.   - Collaborate with rehabilitation services on mobility goals if consulted  - Perform Range of Motion 2 times a day.  - Reposition patient every 2 hours.  - Dangle patient 2 times a day  - Stand patient 2 times a day  - Ambulate patient 3 times a day  - Out of bed to chair 3 times a day   - Out of bed for meals 3 times a day  - Out of bed for toileting  - Record patient progress and toleration of activity level   Outcome: Progressing     Problem: DISCHARGE  PLANNING  Goal: Discharge to home or other facility with appropriate resources  Description: INTERVENTIONS:  - Identify barriers to discharge w/patient and caregiver  - Arrange for needed discharge resources and transportation as appropriate  - Identify discharge learning needs (meds, wound care, etc.)  - Arrange for interpretive services to assist at discharge as needed  - Refer to Case Management Department for coordinating discharge planning if the patient needs post-hospital services based on physician/advanced practitioner order or complex needs related to functional status, cognitive ability, or social support system  Outcome: Progressing     Problem: Knowledge Deficit  Goal: Patient/family/caregiver demonstrates understanding of disease process, treatment plan, medications, and discharge instructions  Description: Complete learning assessment and assess knowledge base.  Interventions:  - Provide teaching at level of understanding  - Provide teaching via preferred learning methods  Outcome: Progressing     Problem: Nutrition/Hydration-ADULT  Goal: Nutrient/Hydration intake appropriate for improving, restoring or maintaining nutritional needs  Description: Monitor and assess patient's nutrition/hydration status for malnutrition. Collaborate with interdisciplinary team and initiate plan and interventions as ordered.  Monitor patient's weight and dietary intake as ordered or per policy. Utilize nutrition screening tool and intervene as necessary. Determine patient's food preferences and provide high-protein, high-caloric foods as appropriate.     INTERVENTIONS:  - Monitor oral intake, urinary output, labs, and treatment plans  - Assess nutrition and hydration status and recommend course of action  - Evaluate amount of meals eaten  - Assist patient with eating if necessary   - Allow adequate time for meals  - Recommend/ encourage appropriate diets, oral nutritional supplements, and vitamin/mineral supplements  -  Order, calculate, and assess calorie counts as needed  - Recommend, monitor, and adjust tube feedings and TPN/PPN based on assessed needs  - Assess need for intravenous fluids  - Provide specific nutrition/hydration education as appropriate  - Include patient/family/caregiver in decisions related to nutrition  Outcome: Progressing     Problem: CARDIOVASCULAR - ADULT  Goal: Maintains optimal cardiac output and hemodynamic stability  Description: INTERVENTIONS:  - Monitor I/O, vital signs and rhythm  - Monitor for S/S and trends of decreased cardiac output  - Administer and titrate ordered vasoactive medications to optimize hemodynamic stability  - Assess quality of pulses, skin color and temperature  - Assess for signs of decreased coronary artery perfusion  - Instruct patient to report change in severity of symptoms  Outcome: Progressing  Goal: Absence of cardiac dysrhythmias or at baseline rhythm  Description: INTERVENTIONS:  - Continuous cardiac monitoring, vital signs, obtain 12 lead EKG if ordered  - Administer antiarrhythmic and heart rate control medications as ordered  - Monitor electrolytes and administer replacement therapy as ordered  Outcome: Progressing     Problem: RESPIRATORY - ADULT  Goal: Achieves optimal ventilation and oxygenation  Description: INTERVENTIONS:  - Assess for changes in respiratory status  - Assess for changes in mentation and behavior  - Position to facilitate oxygenation and minimize respiratory effort  - Oxygen administered by appropriate delivery if ordered  - Encourage broncho-pulmonary hygiene including cough, deep breathe, Incentive Spirometry  - Assess the need for suctioning and aspirate as needed  - Assess and instruct to report SOB or any respiratory difficulty  - Respiratory Therapy support as indicated  Outcome: Progressing     Problem: HEMATOLOGIC - ADULT  Goal: Maintains hematologic stability  Description: INTERVENTIONS  - Assess for signs and symptoms of bleeding or  hemorrhage  - Monitor labs  - Administer supportive blood products/factors as ordered and appropriate  Outcome: Progressing     Problem: MUSCULOSKELETAL - ADULT  Goal: Maintain or return mobility to safest level of function  Description: INTERVENTIONS:  - Assess patient's ability to carry out ADLs; assess patient's baseline for ADL function and identify physical deficits which impact ability to perform ADLs (bathing, care of mouth/teeth, toileting, grooming, dressing, etc.)  - Assess/evaluate cause of self-care deficits   - Assess range of motion  - Assess patient's mobility  - Assess patient's need for assistive devices and provide as appropriate  - Encourage maximum independence but intervene and supervise when necessary  - Involve family in performance of ADLs  - Assess for home care needs following discharge   - Consider OT consult to assist with ADL evaluation and planning for discharge  - Provide patient education as appropriate  Outcome: Progressing  Goal: Maintain proper alignment of affected body part  Description: INTERVENTIONS:  - Support, maintain and protect limb and body alignment  - Provide patient/ family with appropriate education  Outcome: Progressing     Problem: NEUROSENSORY - ADULT  Goal: Achieves stable or improved neurological status  Description: INTERVENTIONS  - Monitor and report changes in neurological status  - Monitor vital signs such as temperature, blood pressure, glucose, and any other labs ordered       Outcome: Progressing  Goal: Achieves maximal functionality and self care  Description: INTERVENTIONS  - Monitor swallowing and airway patency with patient fatigue and changes in neurological status  - Encourage and assist patient to increase activity and self care.   - Encourage visually impaired, hearing impaired and aphasic patients to use assistive/communication devices  Outcome: Progressing     Problem: Communication Impairment  Goal: Ability to express needs and understand  communication  Description: Assess patient's communication skills and ability to understand information.  Patient will demonstrate use of effective communication techniques, alternative methods of communication and understanding even if not able to speak.     - Encourage communication and provide alternate methods of communication as needed.  - Collaborate with case management/ for discharge needs.  - Include patient/family/caregiver in decisions related to communication.  Outcome: Progressing     Problem: Potential for Aspiration  Goal: Non-ventilated patient's risk of aspiration is minimized  Description: Assess and monitor vital signs, respiratory status, and labs (WBC).  Monitor for signs of aspiration (tachypnea, cough, rales, wheezing, cyanosis, fever).    - Assess and monitor patient's ability to swallow.  - Place patient up in chair to eat if possible.  - HOB up at 90 degrees to eat if unable to get patient up into chair.  - Supervise patient during oral intake.   - Instruct patient/ family to take small bites.  - Instruct patient/ family to take small single sips when taking liquids.  - Follow patient-specific strategies generated by speech pathologist.  Outcome: Progressing     Problem: Neurological Deficit  Goal: Neurological status is stable or improving  Description: Interventions:  - Monitor and assess patient's level of consciousness, motor function, sensory function, and level of assistance needed for ADLs.   - Monitor and report changes from baseline. Collaborate with interdisciplinary team to initiate plan and implement interventions as ordered.   - Provide and maintain a safe environment.  - Consider seizure precautions.  - Consider fall precautions.  - Consider aspiration precautions.  - Consider bleeding precautions.  Outcome: Progressing     Problem: Activity Intolerance/Impaired Mobility  Goal: Mobility/activity is maintained at optimum level for patient  Description:  Interventions:  - Assess and monitor patient  barriers to mobility and need for assistive/adaptive devices.  - Assess patient's emotional response to limitations.  - Collaborate with interdisciplinary team and initiate plans and interventions as ordered.  - Encourage independent activity per ability.  - Maintain proper body alignment.  - Perform active rom as tolerated/ordered.  - Plan activities to conserve energy.  - Turn patient as appropriate  Outcome: Progressing     Problem: Nutrition  Goal: Nutrition/Hydration status is improving  Description: Monitor and assess patient's nutrition/hydration status for malnutrition (ex- brittle hair, bruises, dry skin, pale skin and conjunctiva, muscle wasting, smooth red tongue, and disorientation). Collaborate with interdisciplinary team and initiate plan and interventions as ordered.  Monitor patient's weight and dietary intake as ordered or per policy. Utilize nutrition screening tool and intervene per policy. Determine patient's food preferences and provide high-protein, high-caloric foods as appropriate.     - Assist patient with eating.  - Allow adequate time for meals.  - Encourage patient to take dietary supplement as ordered.  - Collaborate with clinical nutritionist.  - Include patient/family/caregiver in decisions related to nutrition.  Outcome: Progressing

## 2024-10-09 NOTE — CONSULTS
[facility]   Neurology Initial Consult    Naresh Carpio is a 64 y.o. male  ICU 04/ICU 04          Information obtained from:   Chief Complaint   Patient presents with    Fall     Pt states on his way to restroom, floor wet and he slipped and fell and injured R knee causing abrasion.         Assessment/Plan:    1.  Falls  2.  AMS-poss dialysis disequalibrium syndrome  3.  Strokelike symptoms with expressive/receptive aphasia and decreased sensation  4.  HLD   5.  HTN   6.  ESRD on dialysis -missed 1 week treatment   7.  DM   8.  Medication nonadherence    -ICU monitoring post TNK protocol  -Neurological assessments  -Fall risk  -No AP/AC therapies for 24 hours post TNK, TNK given at 2:04 PM 10/8/2024  -Atorvastatin 80 mg daily  -MRI of the brain scheduled for 2:45 PM  -CTh scheduled for 1:45 PM  -Echocardiogram shunt study pending  -Lipid profile and K8u-L1x 5.8  -PT/OT/ST  -CM/as W assistance for discharge planning    Patient is a 64-year-old male with end-stage renal disease, HTN, HLD, DM, TIAs and nonadherence who was discharged from SNF facility approximately 1 week prior to his hospitalization.  Patient had reportedly been medication nonadherence since that time, taking only his oxycodone as well as his Lipitor.  Medical records reviewed indicating patient had previously been on Eliquis however is unclear when his last Eliquis was taken.  Patient was admitted after having hypertension, swelling and traumatic fall with head strike.  Initial CT of the head post head strike was negative for any bleed and/or evidence of acute ischemic event.  Patient was admitted after given medications for hypertension and fluid overload.  He was on cardiac stepdown unit and received dialysis.  Postdialysis patient had onset of confusion, difficulties with expressive and receptive aphasia.  He had decreased sensation throughout with positive tremor and ataxia on exam.  Patient was a stroke alert at that time, sent  for repeat CT.  CT of the head was negative for any acute findings.  CTA of the head and neck with some mild cervical ICA/VA stenosis, intracranial vessels with moderate to advanced stenosis in the right proximal/supraclinoid ICA.  Moderate cavernous/supraclinoid segment stenosis with bilateral V4 scattered calcifications noted.  Due to lack of medication adherence, unclear as to when his last Eliquis dosing was, medication nonadherence and significant vascular risk, cannot deny possible ischemic event therefore patient was provided TNK for stroke management.  Patient was admitted to the ICU for further and closer neurological evaluation and assessments.  Patient previously indicated and reports that he had not had dialysis for approximately 1 week.  He was scheduled to initiate dialysis at a new center on 10/8/2024.  He was unable to make that appointment due to being in the ER and admitted.  Patient's potassium was 5.8 on arrival, creatinine was 11.13.  He did receive dialysis in the hospital and had onset of confusion and neurological changes postdialysis.  Question the possibility of dialysis disequilibrium syndrome as his creatinine had decreased to 5.20 postdialysis.  However again due to his vascular risk factors and issues with adherence, can also not rule out the possibility of ischemic event.      Spoke with the pt at the bedside, noted that he had been scheduled for dialysis but did miss a week while awaiting new clinic appointment.  He noted that he continued to have dialysis during his time in SNF.   Pt is not aware of being on Eliquis, noted that he may have been on it in the past but is not familiar to him now.   Pt is not on AP therapy but reports he has been taking a statin.  Pt reported he is his baseline now with exception to pain and weakness in the knees R>L due to the fall.  He does have swelling to the Rt knee and ecchymosis on the left knee.    He has equal strength in b/l UE and LE.  Equal  sensation and no ataxia on exam, Heel to Saxena is limited 2nd to pain.  Plantar reflex limited to the Rt foot 2nd to Great toe amputation.   Pt is alert and oriented, speech is clear and fluent. He is having no receptive issues and following simple and more complex commands.  He does have slight tremor in the UE, noted that he does not typically have a tremor but noted that he is cold and is shivering.   Pt is aware of event last night, although has no personal recollection of it.  We discussed the plan re: MRI and CT, he reports being severely claustrophobic and has not had enough response to Valium in the past.  ICU AP made aware, pt may need to have IV medication prior to his testing this afternoon.   MRI of the brain is pending, repeat CT post TNK also pending.  TTE pending.        Naresh Carpio will need follow up in 8-10 weeks with general attending or advance practitioner. He will not require outpatient neurological testing.      HPI:  Naresh Carpio is a 63yo male with PMH of chronic kidney disease/end-stage renal failure on dialysis, DM, HLD, HTN and TIAs who was brought to the ER after having slip and fall in the bathroom.  He had no reports of loss of consciousness or head strike.  Patient had complaints of knee pain.  Was seen and evaluated in the ER, knee x-rays were completed.  Patient had refused CT of the head at that time.  Patient wanting to sign out AMA, family called to pick him up.  Patient was having dialysis and a new dialysis clinic today, family was called to pick him up to make his appointment.  As he was getting into the car, his lower extremities became very tremulous and patient fell with head strike during this event, no LOC.  Patient was brought to the ER again, agreeable to CAT scan at this point in time which was negative for any acute findings and/or bleed.  Patient is end-stage renal failure, he was slated to have dialysis on 10/8/2024 after missing a week of dialysis services.   Creatinine 11.13, K5.8.  In addition to missed dialysis treatments, patient had also indicated not taking his medications with the exception of his statin and pain medication, he had elevated blood pressure with slight fluid overload was given Lasix and blood pressure medication.  Due to his fall, tremor weakness with hypertension, patient was admitted to stepdown unit for further treatment and renal consultation.  Patient had initiated dialysis while admitted, postdialysis however he had neurological changes.  He had altered mental status with difficulties in his speech, had expressive and receptive aphasia.  He also had decreased sensations bilaterally with tremors.  He had an NIH score of 8 at the time, he was a stroke alert.  Patient was sent for repeat CT of the head due to stroke alert which was negative for any findings of bleed.  He had a CTA of the head and neck which shows some mild cervical ICA and vertebral artery stenosis.  Intracranial stenosis with moderate to advanced in the right proximal subglenoid ICA territories, moderate stenosis in the cavernous and subclavian annoyed segments with bilateral V4 scattered calcifications.  Patient had been previously on Eliquis, however is unclear as to when he had his AC therapies last.  Again patient had reported not taking his medications regularly or being adherent since he was discharged from SNF approximately 1 week ago.  Due to the likelihood of him being off Eliquis with neurological changes, patient was within window of treatment for TNK.  He did receive TNK was transferred to the ICU for further management.  Postdialysis creatinine 5.20, potassium has decreased to 4.1.  Sodium down to 132, previously 138.  Patient did have positive UDS for oxycodone however patient does take oxycodone for pain management.  A1c 5.8, lipid panel remains pending at this time.  Patient is scheduled for repeat CTh post TNK at 1/30/1940 5 PM, MRI is tentatively scheduled for  2:45 PM.  Suspect the possibility of DDS, dialysis disequilibrium syndrome due to his missed dialysis treatments with significant change in creatinine postdialysis however given patient's medication nonadherence, hypertension, swelling and neurological changes, could not completely rule out the possibility of ischemic event.  Will discuss AP therapies with attending neurology MD after MRI of the brain is completed.  Continue patient on his current statin dosing pending the outcome of his lipid profile.  PT/OT/ST, case management assistance in discharge planning.      Past Medical History:   Diagnosis Date    CKD     Diabetes mellitus (HCC)     Hyperlipidemia     Hypertension     Left toe amputee (HCC)     TIA (transient ischemic attack)        Past Surgical History:   Procedure Laterality Date    AMPUTATION Left     left foot resection 10 years ago dr tran    IR LOWER EXTREMITY ANGIOGRAM  08/29/2023    IR TEMPORARY DIALYSIS CATHETER PLACEMENT  08/25/2023    IR TUNNELED CENTRAL LINE REMOVAL  08/23/2023    IR TUNNELED DIALYSIS CATHETER PLACEMENT  01/27/2022    IR TUNNELED DIALYSIS CATHETER PLACEMENT  08/30/2023    ME AMPUTATION METATARSAL W/TOE SINGLE Right 8/31/2023    Procedure: RIGHT PARTIAL 1ST RAY RESECTION WITH  WOUND VAC APPLICATION;  Surgeon: Won Parmar DPM;  Location: [facility];  Service: Podiatry       No Known Allergies      Current Facility-Administered Medications:     acetaminophen (Ofirmev) injection 1,000 mg, 1,000 mg, Intravenous, Q6H PRN, GRANT Hernandez, Last Rate: 400 mL/hr at 10/09/24 0351, 1,000 mg at 10/09/24 0351    allopurinol (ZYLOPRIM) tablet 100 mg, 100 mg, Oral, Daily, RAMEZ HernandezNP, 100 mg at 10/09/24 0835    atorvastatin (LIPITOR) tablet 80 mg, 80 mg, Oral, Daily With Dinner, GRANT Hernandez    chlorhexidine (PERIDEX) 0.12 % oral rinse 15 mL, 15 mL, Mouth/Throat, Q12H KEMAL, GRANT Hernandez, 15 mL at 10/09/24 0835    docusate sodium  (COLACE) capsule 100 mg, 100 mg, Oral, BID, GRANT Hernandez, 100 mg at 10/09/24 0835    influenza vaccine, high-dose (Fluzone High-Dose) IM injection 0.5 mL, 0.5 mL, Intramuscular, Once PRN, GRANT Hernandez    insulin lispro (HumALOG/ADMELOG) 100 units/mL subcutaneous injection 1-6 Units, 1-6 Units, Subcutaneous, Q6H KEMAL, 1 Units at 10/08/24 1430 **AND** Fingerstick Glucose (POCT), , , Q6H, GRANT Hernandez    labetalol (NORMODYNE) injection 10 mg, 10 mg, Intravenous, Q4H PRN, GRANT Hernandez, 10 mg at 10/08/24 1815    mupirocin (BACTROBAN) 2 % ointment, , Topical, TID, GRANT Hernandez, Given at 10/09/24 1024    polyethylene glycol (MIRALAX) packet 17 g, 17 g, Oral, Daily PRN, GRANT Hernandez    Social History     Socioeconomic History    Marital status:      Spouse name: Not on file    Number of children: Not on file    Years of education: Not on file    Highest education level: Not on file   Occupational History    Not on file   Tobacco Use    Smoking status: Never     Passive exposure: Never    Smokeless tobacco: Never   Vaping Use    Vaping status: Never Used   Substance and Sexual Activity    Alcohol use: Not Currently     Alcohol/week: 0.0 standard drinks of alcohol     Comment: 0    Drug use: Never    Sexual activity: Not on file   Other Topics Concern    Not on file   Social History Narrative    Not on file     Social Determinants of Health     Financial Resource Strain: Not on file   Food Insecurity: No Food Insecurity (10/8/2024)    Hunger Vital Sign     Worried About Running Out of Food in the Last Year: Never true     Ran Out of Food in the Last Year: Never true   Transportation Needs: No Transportation Needs (10/8/2024)    PRAPARE - Transportation     Lack of Transportation (Medical): No     Lack of Transportation (Non-Medical): No   Physical Activity: Not on file   Stress: Not on file   Social Connections: Not on file  "  Intimate Partner Violence: Not on file   Housing Stability: High Risk (10/8/2024)    Housing Stability Vital Sign     Unable to Pay for Housing in the Last Year: No     Number of Times Moved in the Last Year: 2     Homeless in the Last Year: No       History reviewed. No pertinent family history.      Review of systems:  Please see HPI for positive symptoms.   Constitutional: No fever, no chills, no weight change.   Ocular: No diplopia, no blurred vision, spots/zigzag lines  HEENT:  No sore throat, headache or congestion.   COR:  No chest pain. No palpitations.   Lungs:  no sob  GI:  no  nausea, no vomiting, no diarrhea, no constipation, no anorexia.   :  No dysuria, frequency, or urgency. No hematuria.    Musculoskeletal:+ joint pain or swelling to b/l knees R>L  Skin:  No rash or itching.   Psychiatric:  no anxiety, no depression.   + claustrophobia  Endocrine:  No polyuria or polydipsia.    Physical examination:  BP (!) 180/71   Pulse 75   Temp 98.9 °F (37.2 °C) (Temporal)   Resp (!) 29   Ht 6' 1\" (1.854 m)   Wt 103 kg (227 lb 1.2 oz)   SpO2 96%   BMI 29.96 kg/m²     GENERAL APPEARANCE:  The patient is alert, oriented.    HEENT:  Head is normocephalic.  Pupils are equal and reactive.    NECK:  Supple without lymphadenopathy.   HEART:  Regular rate and rhythm.  LUNGS:  No audible wheezing or stridor heard  ABDOMEN:  Soft, nontender, nondistended.    EXTREMITIES:  Without cyanosis, clubbing or edema.     Mental status:  The patient is alert, attentive, and oriented.   Speech is clear and fluent, good repetition, comprehension, and naming.   Cranial nerves:  CN II: Visual fields are full to confrontation.   Fundoscopic exam is normal with sharp discs and no vascular changes.  Pupils are 3 mm and briskly reactive to light.   CN III, IV, VI: At primary gaze, there is no eye deviation.   CN V: Facial sensation is intact in all 3 divisions bilaterally.   Corneal responses are intact.  CN VII: Face is " symmetric with normal eye closure and smile.  CN VIII: Hearing is normal to rubbing fingers  CN IX, X: Palate elevates symmetrically. Phonation is normal.  CN XI: Head turning and shoulder shrug are intact  CN XII: Tongue is midline with normal movements and no atrophy.  Motor:  There is no pronator drift of out-stretched arms.    Muscle bulk and tone are normal.   Muscle exam  Arm Right Left Leg Right Left   Deltoid 5/5 5/5 Iliopsoas 5/5 5/5   Biceps 5/5 5/5 Quads 4+/5 5/5   Triceps 5/5 5/5 Hamstrings 4+/5 5/5   Wrist Extension 5/5 5/5 Ankle Dorsi Flexion 5/5 5/5   Wrist Flexion 5/5 5/5 Ankle Plantar Flexion 5/5 5/5        Reflexes    RJ BJ TJ KJ AJ Plantars Love's   Right 2+ 2+  2+ 2+ Toe amp. Not present   Left 2+ 2+  2+ 2+ Downgoing Not present      Sensory:  Light touch, Temperature, position sense, and vibration sense are intact in fingers and toes.  Coordination:  Rapid alternating movements and fine finger movements are intact.   There is no dysmetria on finger-to-nose and heel-knee-shin.   There are + abnormal or extraneous movements with shivering, denies tremors  Romberg deferred due to safety  Gait/Stance:  Deferred gait testing while in ICU for safety    Lab Results   Component Value Date    WBC 9.63 10/08/2024    HGB 10.2 (L) 10/08/2024    HCT 32.3 (L) 10/08/2024    MCV 94 10/08/2024     10/08/2024     Lab Results   Component Value Date    HGBA1C 5.5 10/08/2024     Lab Results   Component Value Date    ALT 39 10/08/2024    AST 25 10/08/2024    ALKPHOS 100 10/08/2024     Lab Results   Component Value Date    CALCIUM 8.0 (L) 10/08/2024    K 4.1 10/08/2024    CO2 22 10/08/2024    CL 98 10/08/2024    BUN 35 (H) 10/08/2024    CREATININE 5.20 (H) 10/08/2024         Review of reports and notes reveal:  Independent Interpretation of images or specimens:  CT stroke alert brain    Result Date: 10/8/2024  -No acute intracranial hemorrhage, mass effect or midline shift. Findings were directly discussed  with Nicole Beardena at 1:57 pm. on 10/8/2024. Workstation performed: [id number]     CTA stroke alert (head/neck)    Result Date: 10/8/2024  -No large vessel occlusion, high-grade stenosis or dissection. -Intracranial ICA atherosclerosis resulting in narrowing most prominent involving the proximal right supraclinoid segment (moderate to advanced). Findings were directly discussed with Nicole Sandovalhta at 1:57 pm. on 10/8/2024. Workstation performed: [id number]     XR knee 4+ vw right injury    Result Date: 10/8/2024  No acute osseous abnormality. Severe tricompartmental osteoarthritis. Moderate joint effusion. Computerized Assisted Algorithm (CAA) may have been used to analyze all applicable images. Workstation performed: [id number]     CT spine cervical without contrast    Result Date: 10/8/2024  No cervical spine fracture or traumatic malalignment. Workstation performed: [id number]     CT head without contrast    Result Date: 10/8/2024  No acute intracranial abnormality. Workstation performed: [id number]           Thank you for this consult.    Total time of encounter: 70 Minutes  More than 50% of time was spent in counseling and coordination of care of patient.  Discussed care with patient at bedside, medical team and attending neurology MD.

## 2024-10-09 NOTE — PROGRESS NOTES
"NEPHROLOGY HOSPITAL PROGRESS NOTE   Naresh Carpio 64 y.o. male MRN: [id number]  Unit/Bed#: ICU 04 Encounter: [id number]  Reason for Consult: ESRD on HD    ASSESSMENT and PLAN:  Naresh Carpio is a 64 y.o. male who was admitted to [facility] after presenting with fall. A renal consultation is requested for assistance in the management of ESRD on HD.     1.  ESRD on HD.  TTS at [facility].  Missed 1 week of dialysis prior to admission.  Last HD 10/08 on admission.  Next HD treatment 10/10.     2.  Access.  Right IJ permacath.     3.  Hyperkalemia.  Resolved with HD.     4.  Anemia in CKD.  Hemoglobin 10.2.  No indication for SURINDER.     5.  Anion gap metabolic acidosis.  Resolved with HD.     6.  Hypertension.  Home Rx: Nifedipine 30 mg daily.  Current Rx: None.  Blood pressure goal per neurology.     7.  Hyperphosphatemia.  Continue Renagel 1 tablet 3 times daily with meals.    8.  Strokelike symptoms.  Status post TNK on 10/08.  Status post CTA head/neck with intracranial ICA atherosclerosis resulting in narrowing most prominent involving proximal right supraclinoid segment, no large vessel occlusion.  MRI pending.  Management per neurology.    Discussed with ICU team.  After discussion, we agreed that patient is stable for interdialytic timeframe and next hemodialysis session will be tomorrow.    SUBJECTIVE / 24H INTERVAL HISTORY:  Events noted from yesterday.  He received TNK.  He is currently receiving echocardiogram.  He denies dyspnea.    OBJECTIVE:  Current Weight: Weight - Scale: 103 kg (227 lb 1.2 oz)  Vitals:    10/09/24 0551 10/09/24 0600 10/09/24 0700 10/09/24 0739   BP:  146/96 (!) 178/108 (!) 178/108   BP Location:  Right arm Right arm    Pulse:  81 79 75   Resp:  (!) 41 (!) 39 (!) 29   Temp:  98.7 °F (37.1 °C) 98.9 °F (37.2 °C)    TempSrc:  Temporal Temporal    SpO2:  98% 96% 96%   Weight: 103 kg (227 lb 15.3 oz)   103 kg (227 lb 1.2 oz)   Height:    6' 1\" (1.854 m) "       Intake/Output Summary (Last 24 hours) at 10/9/2024 0819  Last data filed at 10/9/2024 0330  Gross per 24 hour   Intake 720 ml   Output 5000 ml   Net -4280 ml     Review of Systems   Constitutional:  Negative for chills and fever.   HENT:  Negative for ear pain and sore throat.    Eyes:  Negative for pain and visual disturbance.   Respiratory:  Negative for cough and shortness of breath.    Cardiovascular:  Negative for chest pain and palpitations.   Gastrointestinal:  Negative for abdominal pain and vomiting.   Genitourinary:  Negative for dysuria and hematuria.   Musculoskeletal:  Negative for arthralgias and back pain.   Skin:  Negative for color change and rash.   Neurological:  Negative for seizures and syncope.   All other systems reviewed and are negative.    Physical Exam  Vitals and nursing note reviewed.   Constitutional:       General: He is not in acute distress.     Appearance: He is well-developed.   HENT:      Head: Normocephalic and atraumatic.   Eyes:      Conjunctiva/sclera: Conjunctivae normal.   Cardiovascular:      Rate and Rhythm: Normal rate and regular rhythm.      Pulses: Normal pulses.      Heart sounds: Normal heart sounds. No murmur heard.     Comments: Right chest wall permacath present  Pulmonary:      Effort: Pulmonary effort is normal. No respiratory distress.      Breath sounds: Normal breath sounds.   Abdominal:      Palpations: Abdomen is soft.      Tenderness: There is no abdominal tenderness.   Musculoskeletal:         General: No swelling.      Cervical back: Neck supple.      Right lower leg: Edema present.      Left lower leg: Edema present.   Skin:     General: Skin is warm and dry.      Capillary Refill: Capillary refill takes less than 2 seconds.   Neurological:      Mental Status: He is alert.   Psychiatric:         Mood and Affect: Mood normal.       Medications:    Current Facility-Administered Medications:     acetaminophen (Ofirmev) injection 1,000 mg, 1,000 mg,  "Intravenous, Q6H PRN, GRANT Hernandez, Last Rate: 400 mL/hr at 10/09/24 0351, 1,000 mg at 10/09/24 0351    allopurinol (ZYLOPRIM) tablet 100 mg, 100 mg, Oral, Daily, GRANT Hernandez    atorvastatin (LIPITOR) tablet 80 mg, 80 mg, Oral, Daily With Dinner, GRANT Hernandez    chlorhexidine (PERIDEX) 0.12 % oral rinse 15 mL, 15 mL, Mouth/Throat, Q12H KEMAL, GRANT Hernandez, 15 mL at 10/08/24 2036    docusate sodium (COLACE) capsule 100 mg, 100 mg, Oral, BID, GRANT Hernandez    influenza vaccine, high-dose (Fluzone High-Dose) IM injection 0.5 mL, 0.5 mL, Intramuscular, Once PRN, GRANT Hernandez    insulin lispro (HumALOG/ADMELOG) 100 units/mL subcutaneous injection 1-6 Units, 1-6 Units, Subcutaneous, Q6H KEMAL, 1 Units at 10/08/24 1430 **AND** Fingerstick Glucose (POCT), , , Q6H, GRANT Hernandez    labetalol (NORMODYNE) injection 10 mg, 10 mg, Intravenous, Q4H PRN, GRANT Hernandez, 10 mg at 10/08/24 1815    mupirocin (BACTROBAN) 2 % ointment, , Topical, TID, GRANT Hernandez, Given at 10/08/24 2034    polyethylene glycol (MIRALAX) packet 17 g, 17 g, Oral, Daily PRN, GRANT Hernandez    Laboratory Results:  Results from last 7 days   Lab Units 10/08/24  1407 10/08/24  0454 10/08/24  0216   WBC Thousand/uL  --  9.63 7.36   HEMOGLOBIN g/dL  --  10.2* 9.8*   HEMATOCRIT %  --  32.3* 31.6*   PLATELETS Thousands/uL  --  206 192   POTASSIUM mmol/L 4.1 5.8* 5.8*   CHLORIDE mmol/L 98 106 105   CO2 mmol/L 22 16* 19*   BUN mg/dL 35* 94* 95*   CREATININE mg/dL 5.20* 11.08* 11.13*   CALCIUM mg/dL 8.0* 8.3* 8.1*   MAGNESIUM mg/dL  --   --  2.3   PHOSPHORUS mg/dL  --   --  7.7*       Portions of the record may have been created with voice recognition software. Occasional wrong word or \"sound a like\" substitutions may have occurred due to the inherent limitations of voice recognition software. Read the chart carefully and " recognize, using context, where substitutions have occurred. If you have any questions, please contact the dictating provider.

## 2024-10-09 NOTE — PHYSICAL THERAPY NOTE
PHYSICAL THERAPY     10/09/24 1449   Note Type   Note type Cancelled Session   Cancel Reasons Patient off floor/test  (patient off unit at MRI and sedated for testing. will re-attempt at a later time as appropriate.)   Licensure   [place] License Number  Anat Lukasz  [id number]

## 2024-10-09 NOTE — PROGRESS NOTES
Progress Note - Critical Care/ICU   Name: Naresh Carpio 64 y.o. male I MRN: [id number]  Unit/Bed#: ICU 04 I Date of Admission: 10/8/2024   Date of Service: 10/9/2024 I Hospital Day: 0      Assessment & Plan  Stroke-like symptoms  Patient completed dialysis with last known normal at 12:58  RRT was called when patient was found to have acute change in neuro status  NIH 8 for bilateral drift, impaired sensation, expressive and receptive aphasia   Stroke alert was called   Per chart review, it appears patient was prescribed Eliquis, though was not listed in PTA medication list on admission. Patient unable to provide additional information at this time  CT head without evidence of bleed > decision made to proceed with TNK  CT brain negative   CTA head/neck: Intracranial ICA atherosclerosis resulting in narrowing most prominent involving the proximal right supraclinoid segment (moderate to advanced     Plan:  MRI  Repeat CTH 24 hour post TNK  ECHO  PT/OT/ST  ESRD (end stage renal disease) ([facility])  HD on TTS  S/p iHD on 10/8  Nephrology consulted and following     Type 2 diabetes mellitus, without long-term current use of insulin ([facility])  Continue SSI + accu checks q6hr while NPO     Anemia  Likely anemia of chronic disease  Hemoglobin 10.2  Monitor     Status post fall  PT/ OT  Hyperkalemia  In setting of ESRD  S/p iHD today     PVD (peripheral vascular disease) ([facility])  Per chart review on Eliquis   Unclear last time patient took Eliquis     Disposition: Critical care    ICU Core Measures     A: Assess, Prevent, and Manage Pain Has pain been assessed? Yes  Need for changes to pain regimen? Yes   B: Both SAT/SAT  N/A   C: Choice of Sedation RASS Goal: N/A patient not on sedation  Need for changes to sedation or analgesia regimen? No   D: Delirium CAM-ICU: Negative   E: Early Mobility  Plan for early mobility? Yes   F: Family Engagement Plan for family engagement today? Yes       Review of Invasive Devices:    [facility] Plan:  Continue for accurate I/O monitoring for 48 hours  Central access plan: HD cath in place.  Plan continue       Prophylaxis:  VTE VTE covered by:    None       Stress Ulcer  not ordered         24 Hour Events : Patient was admitted to the ICU yesterday afternoon after receiving TNK.  Continue with intermittent expressive aphasia throughout the night.  Early last evening, the patient had a bladder scan with 750 cc of urine.  He was restless, uncomfortable, and in pain.  Spoke with on-call ICU attending who was in agreement to place Arguelles catheter despite receiving TNK to help with patient's discomfort.  Arguelles was placed without incident after receiving 1 dose of IV Tylenol, patient had improvement.  Subjective   Review of Systems: Review of Systems   Constitutional:  Positive for fatigue.   HENT: Negative.     Eyes: Negative.    Respiratory: Negative.     Cardiovascular: Negative.    Gastrointestinal: Negative.    Endocrine: Negative.    Genitourinary: Negative.    Musculoskeletal: Negative.    Allergic/Immunologic: Negative.    Neurological:  Positive for weakness.   Hematological: Negative.    Psychiatric/Behavioral: Negative.         Objective :                   Vitals I/O      Most Recent Min/Max in 24hrs   Temp 98.2 °F (36.8 °C) Temp  Min: 97.1 °F (36.2 °C)  Max: 99.5 °F (37.5 °C)   Pulse 76 Pulse  Min: 62  Max: 98   Resp (!) 34 Resp  Min: 15  Max: 46   /63 BP  Min: 109/88  Max: 208/83   O2 Sat 98 % SpO2  Min: 95 %  Max: 100 %      Intake/Output Summary (Last 24 hours) at 10/9/2024 0550  Last data filed at 10/9/2024 0330  Gross per 24 hour   Intake 970 ml   Output 5000 ml   Net -4030 ml       Diet NPO    Invasive Monitoring           Physical Exam   Physical Exam  Vitals and nursing note reviewed.   Eyes:      General: Vision grossly intact.      Extraocular Movements: Extraocular movements intact.      Conjunctiva/sclera: Conjunctivae normal.      Pupils: Pupils are equal, round, and reactive to light.    Skin:     General: Skin is warm and dry.   HENT:      Head: Normocephalic and atraumatic.      Right Ear: Hearing and tympanic membrane normal.      Left Ear: Hearing and tympanic membrane normal.      Mouth/Throat:      Mouth: Mucous membranes are dry.   Cardiovascular:      Rate and Rhythm: Normal rate and regular rhythm.   Musculoskeletal:         General: Normal range of motion.   Abdominal: General: Bowel sounds are normal.   Constitutional:       Appearance: He is ill-appearing and toxic-appearing.   Pulmonary:      Comments: On room air, bilateral breath sounds diminished  Neurological:      Mental Status: He is alert. He is confused.      Cranial Nerves: Dysarthria present.   Genitourinary/Anorectal:  Arguelles present.        Diagnostic Studies        Lab Results: I have reviewed the following results:     Medications:  Scheduled PRN   allopurinol, 100 mg, Daily  atorvastatin, 80 mg, Daily With Dinner  chlorhexidine, 15 mL, Q12H KEMAL  docusate sodium, 100 mg, BID  insulin lispro, 1-6 Units, Q6H KEMAL  mupirocin, , TID      acetaminophen, 1,000 mg, Q6H PRN  influenza vaccine, 0.5 mL, Once PRN  labetalol, 10 mg, Q4H PRN  polyethylene glycol, 17 g, Daily PRN       Continuous          Labs:   CBC    Recent Labs     10/08/24  0216 10/08/24  0454   WBC 7.36 9.63   HGB 9.8* 10.2*   HCT 31.6* 32.3*    206     BMP    Recent Labs     10/08/24  0454 10/08/24  1407   SODIUM 138 132*   K 5.8* 4.1    98   CO2 16* 22   AGAP 16* 12   BUN 94* 35*   CREATININE 11.08* 5.20*   CALCIUM 8.3* 8.0*       Coags    No recent results     Additional Electrolytes  Recent Labs     10/08/24  0216   MG 2.3   PHOS 7.7*          Blood Gas    No recent results  No recent results LFTs  Recent Labs     10/08/24  0216 10/08/24  0454   ALT 37 39   AST 24 25   ALKPHOS 95 100   ALB 4.0 4.2   TBILI 0.45 0.52       Infectious  No recent results  Glucose  Recent Labs     10/08/24  0216 10/08/24  0454 10/08/24  1407   GLUC 145* 139 168*

## 2024-10-10 ENCOUNTER — APPOINTMENT (INPATIENT)
Dept: DIALYSIS | Facility: HOSPITAL | Age: 64
DRG: 640 | End: 2024-10-10
Payer: MEDICARE

## 2024-10-10 PROBLEM — R31.9 HEMATURIA: Status: ACTIVE | Noted: 2024-10-10

## 2024-10-10 PROBLEM — G92.8 TOXIC METABOLIC ENCEPHALOPATHY: Status: ACTIVE | Noted: 2024-10-08

## 2024-10-10 LAB
ABO GROUP BLD: NORMAL
ANION GAP SERPL CALCULATED.3IONS-SCNC: 11 MMOL/L (ref 4–13)
BASOPHILS # BLD AUTO: 0.03 THOUSANDS/ΜL (ref 0–0.1)
BASOPHILS NFR BLD AUTO: 1 % (ref 0–1)
BLD GP AB SCN SERPL QL: NEGATIVE
BUN SERPL-MCNC: 49 MG/DL (ref 5–25)
CALCIUM SERPL-MCNC: 7.5 MG/DL (ref 8.4–10.2)
CHLORIDE SERPL-SCNC: 99 MMOL/L (ref 96–108)
CO2 SERPL-SCNC: 22 MMOL/L (ref 21–32)
CREAT SERPL-MCNC: 7.55 MG/DL (ref 0.6–1.3)
EOSINOPHIL # BLD AUTO: 0.38 THOUSAND/ΜL (ref 0–0.61)
EOSINOPHIL NFR BLD AUTO: 6 % (ref 0–6)
ERYTHROCYTE [DISTWIDTH] IN BLOOD BY AUTOMATED COUNT: 17.3 % (ref 11.6–15.1)
GFR SERPL CREATININE-BSD FRML MDRD: 6 ML/MIN/1.73SQ M
GLUCOSE SERPL-MCNC: 104 MG/DL (ref 65–140)
GLUCOSE SERPL-MCNC: 104 MG/DL (ref 65–140)
GLUCOSE SERPL-MCNC: 119 MG/DL (ref 65–140)
GLUCOSE SERPL-MCNC: 85 MG/DL (ref 65–140)
GLUCOSE SERPL-MCNC: 86 MG/DL (ref 65–140)
HCT VFR BLD AUTO: 21.6 % (ref 36.5–49.3)
HGB BLD-MCNC: 6.9 G/DL (ref 12–17)
HGB BLD-MCNC: 8.1 G/DL (ref 12–17)
IMM GRANULOCYTES # BLD AUTO: 0.02 THOUSAND/UL (ref 0–0.2)
IMM GRANULOCYTES NFR BLD AUTO: 0 % (ref 0–2)
LYMPHOCYTES # BLD AUTO: 0.97 THOUSANDS/ΜL (ref 0.6–4.47)
LYMPHOCYTES NFR BLD AUTO: 15 % (ref 14–44)
MAGNESIUM SERPL-MCNC: 2.1 MG/DL (ref 1.9–2.7)
MCH RBC QN AUTO: 29.7 PG (ref 26.8–34.3)
MCHC RBC AUTO-ENTMCNC: 31.9 G/DL (ref 31.4–37.4)
MCV RBC AUTO: 93 FL (ref 82–98)
MONOCYTES # BLD AUTO: 0.7 THOUSAND/ΜL (ref 0.17–1.22)
MONOCYTES NFR BLD AUTO: 11 % (ref 4–12)
NEUTROPHILS # BLD AUTO: 4.39 THOUSANDS/ΜL (ref 1.85–7.62)
NEUTS SEG NFR BLD AUTO: 67 % (ref 43–75)
NRBC BLD AUTO-RTO: 0 /100 WBCS
PLATELET # BLD AUTO: 190 THOUSANDS/UL (ref 149–390)
PMV BLD AUTO: 10.3 FL (ref 8.9–12.7)
POTASSIUM SERPL-SCNC: 5.2 MMOL/L (ref 3.5–5.3)
RBC # BLD AUTO: 2.32 MILLION/UL (ref 3.88–5.62)
RH BLD: POSITIVE
SODIUM SERPL-SCNC: 132 MMOL/L (ref 135–147)
SPECIMEN EXPIRATION DATE: NORMAL
WBC # BLD AUTO: 6.49 THOUSAND/UL (ref 4.31–10.16)

## 2024-10-10 PROCEDURE — 80048 BASIC METABOLIC PNL TOTAL CA: CPT

## 2024-10-10 PROCEDURE — 83735 ASSAY OF MAGNESIUM: CPT

## 2024-10-10 PROCEDURE — 86850 RBC ANTIBODY SCREEN: CPT | Performed by: NURSE PRACTITIONER

## 2024-10-10 PROCEDURE — 30233N1 TRANSFUSION OF NONAUTOLOGOUS RED BLOOD CELLS INTO PERIPHERAL VEIN, PERCUTANEOUS APPROACH: ICD-10-PCS | Performed by: NURSE PRACTITIONER

## 2024-10-10 PROCEDURE — 99232 SBSQ HOSP IP/OBS MODERATE 35: CPT | Performed by: INTERNAL MEDICINE

## 2024-10-10 PROCEDURE — 86901 BLOOD TYPING SEROLOGIC RH(D): CPT | Performed by: NURSE PRACTITIONER

## 2024-10-10 PROCEDURE — 86900 BLOOD TYPING SEROLOGIC ABO: CPT | Performed by: NURSE PRACTITIONER

## 2024-10-10 PROCEDURE — P9016 RBC LEUKOCYTES REDUCED: HCPCS

## 2024-10-10 PROCEDURE — 82948 REAGENT STRIP/BLOOD GLUCOSE: CPT

## 2024-10-10 PROCEDURE — NC001 PR NO CHARGE: Performed by: NURSE PRACTITIONER

## 2024-10-10 PROCEDURE — 85018 HEMOGLOBIN: CPT | Performed by: NURSE PRACTITIONER

## 2024-10-10 PROCEDURE — 85025 COMPLETE CBC W/AUTO DIFF WBC: CPT

## 2024-10-10 PROCEDURE — 5A1D70Z PERFORMANCE OF URINARY FILTRATION, INTERMITTENT, LESS THAN 6 HOURS PER DAY: ICD-10-PCS | Performed by: INTERNAL MEDICINE

## 2024-10-10 RX ORDER — NIFEDIPINE 30 MG/1
30 TABLET, EXTENDED RELEASE ORAL
Status: DISCONTINUED | OUTPATIENT
Start: 2024-10-10 | End: 2024-10-14

## 2024-10-10 RX ORDER — GABAPENTIN 100 MG/1
100 CAPSULE ORAL 3 TIMES WEEKLY
Status: DISCONTINUED | OUTPATIENT
Start: 2024-10-10 | End: 2024-10-11

## 2024-10-10 RX ORDER — ONDANSETRON 2 MG/ML
4 INJECTION INTRAMUSCULAR; INTRAVENOUS ONCE
Status: COMPLETED | OUTPATIENT
Start: 2024-10-10 | End: 2024-10-10

## 2024-10-10 RX ORDER — HYDRALAZINE HYDROCHLORIDE 20 MG/ML
10 INJECTION INTRAMUSCULAR; INTRAVENOUS EVERY 6 HOURS PRN
Status: DISCONTINUED | OUTPATIENT
Start: 2024-10-10 | End: 2024-10-21

## 2024-10-10 RX ORDER — GABAPENTIN 100 MG/1
200 CAPSULE ORAL 2 TIMES DAILY
Status: DISCONTINUED | OUTPATIENT
Start: 2024-10-10 | End: 2024-10-10

## 2024-10-10 RX ORDER — ESCITALOPRAM OXALATE 10 MG/1
10 TABLET ORAL DAILY
Status: DISCONTINUED | OUTPATIENT
Start: 2024-10-10 | End: 2024-10-22 | Stop reason: HOSPADM

## 2024-10-10 RX ORDER — GABAPENTIN 100 MG/1
100 CAPSULE ORAL 2 TIMES DAILY
Status: DISCONTINUED | OUTPATIENT
Start: 2024-10-10 | End: 2024-10-10

## 2024-10-10 RX ORDER — GABAPENTIN 100 MG/1
100 CAPSULE ORAL 3 TIMES DAILY
Status: DISCONTINUED | OUTPATIENT
Start: 2024-10-10 | End: 2024-10-10

## 2024-10-10 RX ORDER — ONDANSETRON 2 MG/ML
4 INJECTION INTRAMUSCULAR; INTRAVENOUS ONCE
Status: COMPLETED | OUTPATIENT
Start: 2024-10-10 | End: 2024-10-11

## 2024-10-10 RX ORDER — PANTOPRAZOLE SODIUM 40 MG/10ML
40 INJECTION, POWDER, LYOPHILIZED, FOR SOLUTION INTRAVENOUS EVERY 12 HOURS SCHEDULED
Status: DISCONTINUED | OUTPATIENT
Start: 2024-10-10 | End: 2024-10-20

## 2024-10-10 RX ADMIN — PANTOPRAZOLE SODIUM 40 MG: 40 INJECTION, POWDER, FOR SOLUTION INTRAVENOUS at 09:19

## 2024-10-10 RX ADMIN — DOCUSATE SODIUM 100 MG: 100 CAPSULE, LIQUID FILLED ORAL at 09:19

## 2024-10-10 RX ADMIN — GABAPENTIN 100 MG: 100 CAPSULE ORAL at 15:45

## 2024-10-10 RX ADMIN — CHLORHEXIDINE GLUCONATE 15 ML: 1.2 RINSE ORAL at 09:19

## 2024-10-10 RX ADMIN — LABETALOL HYDROCHLORIDE 10 MG: 5 INJECTION, SOLUTION INTRAVENOUS at 18:22

## 2024-10-10 RX ADMIN — PANTOPRAZOLE SODIUM 40 MG: 40 INJECTION, POWDER, FOR SOLUTION INTRAVENOUS at 01:32

## 2024-10-10 RX ADMIN — HYDRALAZINE HYDROCHLORIDE 10 MG: 20 INJECTION INTRAMUSCULAR; INTRAVENOUS at 19:44

## 2024-10-10 RX ADMIN — PANTOPRAZOLE SODIUM 40 MG: 40 INJECTION, POWDER, FOR SOLUTION INTRAVENOUS at 20:41

## 2024-10-10 RX ADMIN — ACETAMINOPHEN 1000 MG: 10 INJECTION INTRAVENOUS at 11:36

## 2024-10-10 RX ADMIN — ACETAMINOPHEN 1000 MG: 10 INJECTION INTRAVENOUS at 06:04

## 2024-10-10 RX ADMIN — LABETALOL HYDROCHLORIDE 10 MG: 5 INJECTION, SOLUTION INTRAVENOUS at 09:19

## 2024-10-10 RX ADMIN — DOCUSATE SODIUM 100 MG: 100 CAPSULE, LIQUID FILLED ORAL at 18:20

## 2024-10-10 RX ADMIN — NIFEDIPINE 30 MG: 30 TABLET, EXTENDED RELEASE ORAL at 14:53

## 2024-10-10 RX ADMIN — ONDANSETRON 4 MG: 2 INJECTION INTRAMUSCULAR; INTRAVENOUS at 14:53

## 2024-10-10 RX ADMIN — LABETALOL HYDROCHLORIDE 10 MG: 5 INJECTION, SOLUTION INTRAVENOUS at 13:11

## 2024-10-10 RX ADMIN — ATORVASTATIN CALCIUM 40 MG: 40 TABLET, FILM COATED ORAL at 15:45

## 2024-10-10 RX ADMIN — POLYETHYLENE GLYCOL 3350 17 G: 17 POWDER, FOR SOLUTION ORAL at 18:41

## 2024-10-10 RX ADMIN — HYOSCYAMINE SULFATE 1 APPLICATION: 16 SOLUTION at 09:23

## 2024-10-10 RX ADMIN — ALLOPURINOL 100 MG: 100 TABLET ORAL at 09:19

## 2024-10-10 RX ADMIN — GABAPENTIN 100 MG: 100 CAPSULE ORAL at 12:54

## 2024-10-10 RX ADMIN — ACETAMINOPHEN 1000 MG: 10 INJECTION INTRAVENOUS at 19:51

## 2024-10-10 RX ADMIN — ESCITALOPRAM OXALATE 10 MG: 10 TABLET ORAL at 12:55

## 2024-10-10 NOTE — QUICK NOTE
Discussed hgb and pending HD with Dr. Linton of Nephrology, who recommends 1U PRBC transfusion. Dr. Linton discussed with the patient and shares with me that he is agreeable.     Discussed blood transfusion with the patient. I reviewed risks and benefits. He states that he understands the risks, and while they make him hesitant, he is agreeable to proceed with transfusion. He has not had blood transfusion before. Consent signed and placed in chart.     1U PRBC ordered for transfusion while on HD per Dr. Linton.

## 2024-10-10 NOTE — ASSESSMENT & PLAN NOTE
Patient completed dialysis with last known normal at 12:58  RRT was called when patient was found to have acute change in neuro status  NIH 8 for bilateral drift, impaired sensation, expressive and receptive aphasia   Stroke alert was called   Per chart review, it appears patient was prescribed Eliquis, though was not listed in PTA medication list on admission. Patient unable to provide additional information at this time  CT head without evidence of bleed > decision made to proceed with TNK  CT brain negative   CTA head/neck: Intracranial ICA atherosclerosis resulting in narrowing most prominent involving the proximal right supraclinoid segment (moderate to advanced   MRI 10/9 without evidence of acute CVA  Likely toxic metabolic encephalopathy in the setting of ESRD, recent missed HD. Now awake, alert and oriented    Plan:  Neuro checks per routine  CAM ICU  Sleep hygiene   Neuro following, appreciate recommendations   PT/OT/ST

## 2024-10-10 NOTE — OCCUPATIONAL THERAPY NOTE
Occupational Therapy Cancellation Note       10/10/24 1500   Note Type   Note type Cancelled Session   Cancel Reasons Other   Additional Comments OT orders received and chart reviewed. Spoke with RN and patient, pt recently finished dialysis and is dizzy and nauseous, deferring participation in OT eval at this time. Will follow.     Niharika Rizzo OTR/L   [place] License # [id number]  [place] License # [id number]

## 2024-10-10 NOTE — PLAN OF CARE
Problem: Potential for Falls  Goal: Patient will remain free of falls  Description: INTERVENTIONS:  - Educate patient/family on patient safety including physical limitations  - Instruct patient to call for assistance with activity   - Consult OT/PT to assist with strengthening/mobility   - Keep Call bell within reach  - Keep bed low and locked with side rails adjusted as appropriate  - Keep care items and personal belongings within reach  - Initiate and maintain comfort rounds  - Make Fall Risk Sign visible to staff  - Offer Toileting every 2 Hours, in advance of need  - Initiate/Maintain bed alarm  - Obtain necessary fall risk management equipment: fall risk sign on door  - Apply yellow socks and bracelet for high fall risk patients  - Consider moving patient to room near nurses station  Outcome: Progressing     Problem: Prexisting or High Potential for Compromised Skin Integrity  Goal: Skin integrity is maintained or improved  Description: INTERVENTIONS:  - Identify patients at risk for skin breakdown  - Assess and monitor skin integrity  - Assess and monitor nutrition and hydration status  - Monitor labs   - Assess for incontinence   - Turn and reposition patient  - Assist with mobility/ambulation  - Relieve pressure over bony prominences  - Avoid friction and shearing  - Provide appropriate hygiene as needed including keeping skin clean and dry  - Evaluate need for skin moisturizer/barrier cream  - Collaborate with interdisciplinary team   - Patient/family teaching  - Consider wound care consult   Outcome: Progressing     Problem: PAIN - ADULT  Goal: Verbalizes/displays adequate comfort level or baseline comfort level  Description: Interventions:  - Encourage patient to monitor pain and request assistance  - Assess pain using appropriate pain scale  - Administer analgesics based on type and severity of pain and evaluate response  - Implement non-pharmacological measures as appropriate and evaluate response  -  Consider cultural and social influences on pain and pain management  - Notify physician/advanced practitioner if interventions unsuccessful or patient reports new pain  Outcome: Progressing

## 2024-10-10 NOTE — PROGRESS NOTES
NEPHROLOGY HOSPITAL PROGRESS NOTE   Naresh Carpio 64 y.o. male MRN: [id number]  Unit/Bed#: ICU 04 Encounter: [id number]  Reason for Consult: ESRD on HD    ASSESSMENT and PLAN:  Naresh Carpio is a 64 y.o. male who was admitted to [facility] after presenting with fall. A renal consultation is requested for assistance in the management of ESRD on HD.     1.  ESRD on HD.  TTS at [facility].  Missed 1 week of dialysis prior to admission.  Last HD 10/08 on admission.  HD today.     2.  Access.  Right IJ permacath.     3.  Hyperkalemia.  Resolved with HD.  Continue 2K bath on HD.     4.  Anemia in CKD.  Hemoglobin 10.2 on admission.  Hemoglobin dropped to 6.9.  Unclear etiology of drop in hemoglobin status post TNK.  Patient agreeable to unit PRBC today.     5.  Anion gap metabolic acidosis.  Resolved with HD.  Continue 35 mEq bath on HD.     6.  Hypertension.  Home Rx: Nifedipine 30 mg daily.  Current Rx: None.  Neurology okay with normotension.  Resume nifedipine at home dose.     7.  Hyperphosphatemia.  Continue Renagel 1 tablet 3 times daily with meals.    8.  Strokelike symptoms.  Status post TNK on 10/08.  Status post CTA head/neck with intracranial ICA atherosclerosis resulting in narrowing most prominent involving proximal right supraclinoid segment, no large vessel occlusion.  MRI brain with no acute ischemia.  Management per neurology.    9.  Hyponatremia.  This is due to lack of free water clearance in setting of ESRD.  Add fluid restriction 1200 cc per 24 hours.    Discussed with ICU team.  After discussion, we agreed for dialysis today and 1 unit PRBC due to development of significant anemia.    SUBJECTIVE / 24H INTERVAL HISTORY:  Denies dyspnea.  Denies leg swelling.  He feels okay.    OBJECTIVE:  Current Weight: Weight - Scale: 104 kg (229 lb 8 oz)  Vitals:    10/10/24 0300 10/10/24 0400 10/10/24 0500 10/10/24 0600   BP: (!) 177/74 164/71 151/67    BP Location:  Right arm     Pulse: 69  68 74    Resp: (!) 27 19 (!) 25    Temp:  97.9 °F (36.6 °C)     TempSrc:  Temporal     SpO2:  99%     Weight:    104 kg (229 lb 8 oz)   Height:           Intake/Output Summary (Last 24 hours) at 10/10/2024 0629  Last data filed at 10/10/2024 0400  Gross per 24 hour   Intake 220 ml   Output 355 ml   Net -135 ml     Review of Systems   Constitutional:  Negative for chills and fever.   HENT:  Negative for ear pain and sore throat.    Eyes:  Negative for pain and visual disturbance.   Respiratory:  Negative for cough and shortness of breath.    Cardiovascular:  Negative for chest pain and palpitations.   Gastrointestinal:  Negative for abdominal pain and vomiting.   Genitourinary:  Negative for dysuria and hematuria.   Musculoskeletal:  Negative for arthralgias and back pain.   Skin:  Negative for color change and rash.   Neurological:  Negative for seizures and syncope.   All other systems reviewed and are negative.    Physical Exam  Vitals and nursing note reviewed.   Constitutional:       General: He is not in acute distress.     Appearance: He is well-developed.   HENT:      Head: Normocephalic and atraumatic.   Eyes:      Conjunctiva/sclera: Conjunctivae normal.   Cardiovascular:      Rate and Rhythm: Normal rate and regular rhythm.      Heart sounds: No murmur heard.     Comments: Right chest wall permacath  Pulmonary:      Effort: Pulmonary effort is normal. No respiratory distress.      Breath sounds: Normal breath sounds.   Abdominal:      Palpations: Abdomen is soft.      Tenderness: There is no abdominal tenderness.   Musculoskeletal:         General: No swelling.      Cervical back: Neck supple.      Right lower leg: No edema.      Left lower leg: No edema.   Skin:     General: Skin is warm and dry.      Capillary Refill: Capillary refill takes less than 2 seconds.   Neurological:      Mental Status: He is alert.   Psychiatric:         Mood and Affect: Mood normal.       Medications:    Current  Facility-Administered Medications:     acetaminophen (Ofirmev) injection 1,000 mg, 1,000 mg, Intravenous, Q6H PRN, GRANT Hernandez, Last Rate: 400 mL/hr at 10/10/24 0604, 1,000 mg at 10/10/24 0604    allopurinol (ZYLOPRIM) tablet 100 mg, 100 mg, Oral, Daily, GRANT Hernandez, 100 mg at 10/09/24 0835    atorvastatin (LIPITOR) tablet 40 mg, 40 mg, Oral, Daily With Dinner, Nicole Middleton MD    chlorhexidine (PERIDEX) 0.12 % oral rinse 15 mL, 15 mL, Mouth/Throat, Q12H KEMAL, GRANT Hernandez, 15 mL at 10/09/24 2028    docusate sodium (COLACE) capsule 100 mg, 100 mg, Oral, BID, GRANT Hernandez, 100 mg at 10/09/24 1811    influenza vaccine, high-dose (Fluzone High-Dose) IM injection 0.5 mL, 0.5 mL, Intramuscular, Once PRN, GRANT Hernandez    insulin lispro (HumALOG/ADMELOG) 100 units/mL subcutaneous injection 1-6 Units, 1-6 Units, Subcutaneous, TID AC **AND** Fingerstick Glucose (POCT), , , TID AC, GRANT Hernandez    labetalol (NORMODYNE) injection 10 mg, 10 mg, Intravenous, Q4H PRN, GRANT Hernandez, 10 mg at 10/08/24 1815    pantoprazole (PROTONIX) injection 40 mg, 40 mg, Intravenous, Q12H KEMAL, Shawna Olivo, RAMEZNP, 40 mg at 10/10/24 0132    polyethylene glycol (MIRALAX) packet 17 g, 17 g, Oral, Daily PRN, GRANT Hernandez    sodium hypochlorite (DAKIN'S HALF-STRENGTH) 0.25 percent topical solution 1 Application, 1 Application, Irrigation, Daily, Dave Orosco MD    Laboratory Results:  Results from last 7 days   Lab Units 10/10/24  0610 10/09/24  2004 10/09/24  1620 10/09/24  1544 10/08/24  1407 10/08/24  0454 10/08/24  0216   WBC Thousand/uL 6.49  --  5.67 5.74  --  9.63 7.36   HEMOGLOBIN g/dL 6.9* 6.9* 6.7* 6.9*  --  10.2* 9.8*   HEMATOCRIT % 21.6* 22.0* 21.0* 21.4*  --  32.3* 31.6*   PLATELETS Thousands/uL 190  --  158 169  --  206 192   POTASSIUM mmol/L  --   --   --  4.7 4.1 5.8* 5.8*   CHLORIDE mmol/L  --    "--   --  98 98 106 105   CO2 mmol/L  --   --   --  23 22 16* 19*   BUN mg/dL  --   --   --  45* 35* 94* 95*   CREATININE mg/dL  --   --   --  7.12* 5.20* 11.08* 11.13*   CALCIUM mg/dL  --   --   --  7.5* 8.0* 8.3* 8.1*   MAGNESIUM mg/dL  --   --   --   --  1.9  --  2.3   PHOSPHORUS mg/dL  --   --   --   --   --   --  7.7*       Portions of the record may have been created with voice recognition software. Occasional wrong word or \"sound a like\" substitutions may have occurred due to the inherent limitations of voice recognition software. Read the chart carefully and recognize, using context, where substitutions have occurred. If you have any questions, please contact the dictating provider.    "

## 2024-10-10 NOTE — PLAN OF CARE
Pre-treatment:  Target UF Goal  3.5  L as tolerated. Will challenge dry weight. Patient dialyzing for 4 hours on 2 K bath for serum K of  5.2  per protocol. Treatment plan reviewed with Dr. Linton.     Post-Dialysis RN Treatment Note    Blood Pressure:  Pre 114/67 mm/Hg  Post 168/98 mmHg   EDW  108 kg - Challenge per Dr. Linton   Weight:  Pre 104 kg   Post 101.1 kg   Mode of weight measurement: Bed Scale   Volume Removed  3000 ml    Treatment duration 4 hours   NS given  Yes, pt c/o cramping in left foot/calf - 100 ml NSS given and UF goal decreased to 3L, cramping resolved    Treatment shortened? No   Medications given during Rx: Tylenol 1,000mg IV for headache and Labetalol 10mg for /76 given by primary RN    Access type: Permacath/TDC   Access Issues: No    Report called to primary nurse: Yes,  Tressa Moran RN      Problem: METABOLIC, FLUID AND ELECTROLYTES - ADULT  Goal: Electrolytes maintained within normal limits  Description: INTERVENTIONS:  - Monitor labs and assess patient for signs and symptoms of electrolyte imbalances  - Administer electrolyte replacement as ordered  - Monitor response to electrolyte replacements, including repeat lab results as appropriate  - Instruct patient on fluid and nutrition as appropriate  Outcome: Progressing  Goal: Fluid balance maintained  Description: INTERVENTIONS:  - Monitor labs   - Monitor I/O and WT  - Instruct patient on fluid and nutrition as appropriate  - Assess for signs & symptoms of volume excess or deficit  Outcome: Progressing

## 2024-10-10 NOTE — CASE MANAGEMENT
Case Management Assessment & Discharge Planning Note    Patient name Naresh Carpio  Location ICU 04/ICU 04 MRN [id number]  : 1960 Date 10/10/2024       Current Admission Date: 10/8/2024  Current Admission Diagnosis:Toxic metabolic encephalopathy   Patient Active Problem List    Diagnosis Date Noted Date Diagnosed    Hematuria 10/10/2024     Falls 10/08/2024     Toxic metabolic encephalopathy 10/08/2024     PVD (peripheral vascular disease) ([facility]) 10/08/2024     Closed fracture of right pelvis ([facility]) 2024     Bilateral sacral fracture, closed ([facility]) 2024     Hyperkalemia 2024     Difficulty with speech 2024     History of amputation of hallux ([facility]) 2024     Hypertensive urgency 2024     Facial cellulitis 2024     Constipation 2023     Bradycardia 2023     Cellulitis of right foot      Polymicrobial bacterial infection 2023     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 mellitus, with necrosis of bone ([facility])      Acquired deformity of foot, right      Charcot's joint      Subacute osteomyelitis of right foot ([facility])      Open wound of right foot 2023     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 mellitus, with bone involvement without evidence of necrosis ([facility])      Diabetic ulcer of left midfoot associated with type 2 diabetes mellitus, limited to breakdown of skin ([facility])      Diabetic polyneuropathy associated with type 2 diabetes mellitus ([facility])      Diabetes mellitus type 2 with peripheral artery disease ([facility])      History of amputation of lesser toe of left foot ([facility])      Onychomycosis      Status post fall 2023     Traumatic rhabdomyolysis ([facility]) 2023     Leukocytosis 2023     Osteoarthritis of left hip 2022     Severe Left Orchalgia 2022     Right shoulder pain 2022     Left hip pain 2022     Hemodialysis status ([facility])      Hypervolemia      Acute on chronic  Contacted by pharmacy regarding Augmentin dosing and interaction with methotrexate.  ChangeD Augmentin dosing to 500mg twice daily and explained to the pharmacist that physician involved had already been in contact with pharmacy and rheumatology and so it was okay to give medication     Mikki Downing MD  04/27/24 2646     anemia 01/23/2022     ESRD (end stage renal disease) (HCC) 01/21/2022     Type 2 diabetes mellitus, without long-term current use of insulin (HCC)      Hypertension      History of TIA (transient ischemic attack)      T10 vertebral fracture (HCC)        LOS (days): 1  Geometric Mean LOS (GMLOS) (days): 3.6  Days to GMLOS:2.5     OBJECTIVE:    Risk of Unplanned Readmission Score: 29.55     Current admission status: Inpatient  Referral Reason: Other (Disposition planning)    Preferred Pharmacy:   [id number] - [place], [place] - [address] [address]  [address] [address]  Phone: [phone] Fax: [phone]    Primary Care Provider: No primary care provider on file.    Primary Insurance: MEDICARE  Secondary Insurance:     ASSESSMENT:  Active Health Care Proxies    There are no active Health Care Proxies on file.       Advance Directives  Does patient currently have a Health Care decision maker?: Yes, please see Health Care Proxy section  Primary Contact: Alicja Carpio    Readmission Root Cause  30 Day Readmission: No    Patient Information  Admitted from:: Home  Mental Status: Alert  During Assessment patient was accompanied by: Not accompanied during assessment  Assessment information provided by:: Patient  Primary Caregiver: Self  Support Systems: Self, Family members  County of Residence: [place]  What city do you live in?: [place]  Type of Current Residence: Other (Comment) (private residence)  Living Arrangements: Lives w/ Family members    Activities of Daily Living Prior to Admission  Functional Status: Independent  Completes ADLs independently?: Yes  Ambulates independently?: Yes  Does patient use assisted devices?: Yes  Assisted Devices (DME) used: Straight Cane  Does patient currently own DME?: Yes  What DME does the patient currently own?: Straight Cane, Walker  Does patient have a history of Outpatient Therapy (PT/OT)?: No  Does the patient have a history of  Short-Term Rehab?: Yes  Does patient have a history of HHC?: No  Does patient currently have HHC?: No    Patient Information Continued  Income Source: SSI/SSD  Does patient have prescription coverage?: No (encouraged pt to apply for Medicare Part D for prescription coverage)  Does patient receive dialysis treatments?: Yes (currently approved to go to [facility] 5:00 am, pt/family transport. Previously pt was going to [facility] in [facility])  Does patient have a history of substance abuse?: No  Does patient have a history of Mental Health Diagnosis?: No    Means of Transportation  Means of Transport to Appts:: Drives Self      Social Determinants of Health (SDOH)      Flowsheet Row Most Recent Value   Housing Stability    In the last 12 months, was there a time when you were not able to pay the mortgage or rent on time? N   In the past 12 months, how many times have you moved where you were living? 2   At any time in the past 12 months, were you homeless or living in a shelter (including now)? N   Transportation Needs    In the past 12 months, has lack of transportation kept you from medical appointments or from getting medications? no   In the past 12 months, has lack of transportation kept you from meetings, work, or from getting things needed for daily living? No   Food Insecurity    Within the past 12 months, you worried that your food would run out before you got the money to buy more. Never true   Within the past 12 months, the food you bought just didn't last and you didn't have money to get more. Never true   Utilities    In the past 12 months has the electric, gas, oil, or water company threatened to shut off services in your home? No            DISCHARGE DETAILS:    Discharge planning discussed with:: Patient  Freedom of Choice: Yes  Comments - Freedom of Choice: SW met with pt to assess needs and discuss plans.  Pt is considering both returning home with sister and STR based on need at time  of discharge.  PT/OT evaluations are pending.  Per pt he has been to [facility] [place] in the past and would return for rehab if needed.  Pt said his ultimate goal is to find an apartment in the [place] area to move back to because that is where he is from.  Pt was established with [facility] in the past but is currently approved to go to [facility] since he stays with is sister.  Pt's chair time with [facility] is TTS at 5:00 am.  SW offered ongoing support and assistance with planning based on treatment team recommendations. SW will follow to monitor progress and assist with planning as needed.    Requested Home Health Care         Is the patient interested in HHC at discharge?: No    DME Referral Provided  Referral made for DME?: No    Other Referral/Resources/Interventions Provided:  Interventions: Other (Specify)  Referral Comments: SW will continue to follow to monitor needs and progress and assist with planning once treatment team recommendations are available.    Treatment Team Recommendation:  (pending evaluations)  Discharge Destination Plan::  (pending)

## 2024-10-10 NOTE — PROGRESS NOTES
Progress Note - Critical Care/ICU   Name: Naresh Carpio 64 y.o. male I MRN: [id number]  Unit/Bed#: ICU 04 I Date of Admission: 10/8/2024   Date of Service: 10/10/2024 I Hospital Day: 1       Critical Care Interval Transfer Note:      Barriers to discharge:   HD per Nephrology   10/10 1U PRBC with appropriate response   Monitor UO -- consider Urology consult if hematuria reoccurs   Underwent Neuropsych consult today, who stated patient has competence to make medical decisions   Will need placement assistance and care coordination, as he is homeless and misses scheduled treatments     Consults: IP CONSULT TO NEPHROLOGY  IP CONSULT TO NEUROLOGY  IP CONSULT TO CASE MANAGEMENT  IP CONSULT TO NUTRITION SERVICES  IP CONSULT TO PSYCHIATRY    Recommended to review admission imaging for incidental findings and document in discharge navigator: Chart reviewed, no known incidental findings noted at this time.      Discharge Plan: pending placement and trending hgb   Central access plan: HD cath in place.  Plan HD per Nephrology       Patient seen and evaluated by Critical Care today and deemed to be appropriate for transfer to [facility] with Telemetry. Spoke to Dr. Zelaya via SC from [facility] to accept transfer. Critical care can be contacted via SecureChat with any questions or concerns.

## 2024-10-10 NOTE — ASSESSMENT & PLAN NOTE
HD on TTS  S/p iHD on 10/8  Nephrology consulted and following   Plan for iHD today   Continue renagel   Trend BMP  I&Os

## 2024-10-10 NOTE — ASSESSMENT & PLAN NOTE
Acute on chronic anemia  Hemoglobin 10.2 on admission  Baseline hemoglobin labile but mostly around 9-10  Repeat hemoglobin after TNK down to 6.7, verified on recheck. unclear cause of acute drop  No signs/symptoms of acute GI bleeding. Abdomen soft. No BMs. No vomiting  No evidence of ecchymosis, trauma. Patient denies pain or complaints  Hemodynamics remain stable   1u PRBC and CT imaging recommended to investigate given recent TNK, patient declined. Is willing to reconsider if hemodynamics change or hemoglobin continues to drop  Continue to closely monitor hemoglobin   Protonix BID empirically

## 2024-10-10 NOTE — QUICK NOTE
Patient assessed at the bedside. We discussed significant drop in hemoglobin which is concerning s/p TNK and fall prior to admission. Patient previously refused blood earlier this evening. I discussed wanting to CT pan scan for possible source of bleed given the acute drop. Patient initially refused CT scan. I explained its importance at length. He was agreeable to wait for repeat labs and would consider CT scan if hemoglobin were to continue to drop.     In the mean time, patient denies current pain or any complaints. No pain with any traumatic injuries s/p recent fall. Denies dark stools. Denies vomiting. No obvious ecchymosis with full body assessment. Soft compartments noted. Hemodynamics remain stable. Patient is currently not in acute distress.     Plan to follow up repeat labs and continue to monitor closely. Patient aware of risks of delaying further investigation or transfusion.       Addendum:   Repeat labs show hemoglobin is stable at 6.9 from 6.7 earlier this afternoon. Vitals remain stable. Again discussed findings with patient who wishes to wait on further diagnostic imaging at this time. If labs or vitals change, will re-address further.

## 2024-10-10 NOTE — PHYSICAL THERAPY NOTE
PHYSICAL THERAPY     10/10/24 1515   Note Type   Note type Cancelled Session   Cancel Reasons Refusal  (Patient with fatigue, nausea and not feeling well following dialysis session this morning.  Deferred activity at this time.  Will reattempt at a later time as appropriate)   Licensure   [place] License Number  Anat Lal, PT 4 0 [id number] [id number]

## 2024-10-10 NOTE — PROGRESS NOTES
Progress Note - Critical Care/ICU   Name: Naresh Carpio 64 y.o. male I MRN: [id number]  Unit/Bed#: ICU 04 I Date of Admission: 10/8/2024   Date of Service: 10/10/2024 I Hospital Day: 1      Assessment & Plan  Toxic metabolic encephalopathy  Patient completed dialysis with last known normal at 12:58  RRT was called when patient was found to have acute change in neuro status  NIH 8 for bilateral drift, impaired sensation, expressive and receptive aphasia   Stroke alert was called   Per chart review, it appears patient was prescribed Eliquis, though was not listed in PTA medication list on admission. Patient unable to provide additional information at this time  CT head without evidence of bleed > decision made to proceed with TNK  CT brain negative   CTA head/neck: Intracranial ICA atherosclerosis resulting in narrowing most prominent involving the proximal right supraclinoid segment (moderate to advanced   MRI 10/9 without evidence of acute CVA  Likely toxic metabolic encephalopathy in the setting of ESRD, recent missed HD. Now awake, alert and oriented    Plan:  Neuro checks per routine  CAM ICU  Sleep hygiene   Neuro following, appreciate recommendations   PT/OT/ST  ESRD (end stage renal disease) (HCC)  HD on TTS  S/p iHD on 10/8  Nephrology consulted and following   Plan for iHD today   Continue renagel   Trend BMP  I&Os    Type 2 diabetes mellitus, without long-term current use of insulin (HCC)  Continue SSI   Continue CCM diet  Goal BG <180    Acute on chronic anemia  Acute on chronic anemia  Hemoglobin 10.2 on admission  Baseline hemoglobin labile but mostly around 9-10  Repeat hemoglobin after TNK down to 6.7, verified on recheck. unclear cause of acute drop  No signs/symptoms of acute GI bleeding. Abdomen soft. No BMs. No vomiting  No evidence of ecchymosis, trauma. Patient denies pain or complaints  Hemodynamics remain stable   1u PRBC and CT imaging recommended to investigate given recent TNK, patient  declined. Is willing to reconsider if hemodynamics change or hemoglobin continues to drop  Continue to closely monitor hemoglobin   Protonix BID empirically     Status post fall  S/p fall with initial pain in right knee  No evidence of traumatic injuries on admission  PT/ OT  Hyperkalemia  In setting of ESRD with missed dialysis, now resolved s/p iHD    PVD (peripheral vascular disease) ([facility])  Per chart review on Eliquis   Unclear last time patient took Eliquis     Hematuria  Gaitan placed yesterday for urinary retention. Patient requested D/C gaitan overnight. Prior to D/C'ing gaitan, urine was clear, yellow  Patient now with hematuria and passing clots into urinal   Denies complaints  Continue to monitor  Urinary retention protocol   Consider urology consult   Disposition: Med Surg    ICU Core Measures     A: Assess, Prevent, and Manage Pain Has pain been assessed? Yes  Need for changes to pain regimen? No   B: Both SAT/SAT  N/A   C: Choice of Sedation RASS Goal: N/A patient not on sedation  Need for changes to sedation or analgesia regimen? NA   D: Delirium CAM-ICU: Neg   E: Early Mobility  Plan for early mobility? Yes   F: Family Engagement Plan for family engagement today? Yes       Review of Invasive Devices:      Central access plan: HD cath in place.  Plan keep, permacath       Prophylaxis:  VTE VTE covered by:    None       Stress Ulcer  covered bypantoprazole (PROTONIX) injection 40 mg [[id number]]         24 Hour Events : MRI negative for stroke. Repeat hemoglobin post TNK with hemoglobin down to 6.7 from 10.2. patient refused blood transfusion. Hemoglobin stable on recheck. Hemodynamically stable overnight. No acute events.     Subjective   Review of Systems: Review of Systems   Respiratory:  Negative for shortness of breath.    Cardiovascular:  Negative for chest pain.   Gastrointestinal:  Negative for abdominal distention, abdominal pain, blood in stool, diarrhea, nausea and vomiting.   Genitourinary:   Positive for hematuria. Negative for difficulty urinating.       Objective :                   Vitals I/O      Most Recent Min/Max in 24hrs   Temp 97.9 °F (36.6 °C) Temp  Min: 97.6 °F (36.4 °C)  Max: 98.9 °F (37.2 °C)   Pulse 68 Pulse  Min: 62  Max: 82   Resp 14 Resp  Min: 14  Max: 41   /77 BP  Min: 130/66  Max: 180/71   O2 Sat 99 % SpO2  Min: 93 %  Max: 99 %      Intake/Output Summary (Last 24 hours) at 10/10/2024 0212  Last data filed at 10/9/2024 2333  Gross per 24 hour   Intake 220 ml   Output 330 ml   Net -110 ml       Diet Matthew/CHO Controlled; Consistent Carbohydrate Diet Level 2 (5 carb servings/75 grams CHO/meal)    Invasive Monitoring           Physical Exam   Physical Exam  Eyes:      Extraocular Movements: Extraocular movements intact.      Pupils: Pupils are equal, round, and reactive to light.   Skin:     General: Skin is warm and dry.      Coloration: Skin is pale.   HENT:      Head: Normocephalic and atraumatic.      Mouth/Throat:      Mouth: Mucous membranes are moist.   Neck:      Vascular: Central line present.      Comments: Tunneled right chest HD line   Cardiovascular:      Rate and Rhythm: Normal rate and regular rhythm.      Heart sounds: Normal heart sounds.   Musculoskeletal:      Right lower leg: No edema.      Left lower leg: No edema.   Constitutional:       Appearance: He is well-developed and well-nourished.   Pulmonary:      Effort: Pulmonary effort is normal.   Neurological:      General: No focal deficit present.      Mental Status: He is alert and oriented to person, place and time.      Motor: gross motor function is at baseline for patient. Strength full and intact in all extremities.   Genitourinary/Anorectal:     Comments: Voiding small amount of bloody urine with clots in urinal          Diagnostic Studies        Lab Results: I have reviewed the following results:     Medications:  Scheduled PRN   allopurinol, 100 mg, Daily  atorvastatin, 40 mg, Daily With  Dinner  chlorhexidine, 15 mL, Q12H KEMAL  docusate sodium, 100 mg, BID  insulin lispro, 1-6 Units, TID AC  pantoprazole, 40 mg, Q12H KEMAL  sodium hypochlorite, 1 Application, Daily      acetaminophen, 1,000 mg, Q6H PRN  influenza vaccine, 0.5 mL, Once PRN  labetalol, 10 mg, Q4H PRN  polyethylene glycol, 17 g, Daily PRN       Continuous          Labs:   CBC    Recent Labs     10/09/24  1544 10/09/24  1620 10/09/24  2004   WBC 5.74 5.67  --    HGB 6.9* 6.7* 6.9*   HCT 21.4* 21.0* 22.0*    158  --      BMP    Recent Labs     10/08/24  1407 10/09/24  1544   SODIUM 132* 131*   K 4.1 4.7   CL 98 98   CO2 22 23   AGAP 12 10   BUN 35* 45*   CREATININE 5.20* 7.12*   CALCIUM 8.0* 7.5*       Coags    No recent results     Additional Electrolytes  Recent Labs     10/08/24  0216 10/08/24  1407   MG 2.3 1.9   PHOS 7.7*  --           Blood Gas    No recent results  No recent results LFTs  Recent Labs     10/08/24  0216 10/08/24  0454   ALT 37 39   AST 24 25   ALKPHOS 95 100   ALB 4.0 4.2   TBILI 0.45 0.52       Infectious  No recent results  Glucose  Recent Labs     10/08/24  0216 10/08/24  0454 10/08/24  1407 10/09/24  1544   GLUC 145* 139 168* 111

## 2024-10-10 NOTE — SPEECH THERAPY NOTE
F/U to evaluation completed bedside after review of records.  Noted results of MRI of brain (No acute ischemia. Minor periventricular white matter change consistent with chronic microangiopathy).  Pt comprehended all moderately complex questions and commands.  He expressed opinions with no s/s thought formulation or expressive deficits.  No additional SLP f/u appears indicated.  He did indicate desire to speak with CM re: d/c planning/social issues.   Yadira Hung MS CCC-SLP  [place] License [id number]

## 2024-10-10 NOTE — CONSULTS
Consultation - Naresh Carpio 64 y.o. male MRN: [id number]    Unit/Bed#: ICU 04 Encounter: [id number]      Identifying Data: 64 years old white male who was admitted at [facility] on October 8, 2024 after he missed dialysis for a week and his creatinine was 11 very high psychiatric consultation is asked to evaluate him for mental competency.  Patient examined spoke to the nurse history physical medications labs reviewed and noted nurse reports that he has a tendency to refuse treatment at times but he is not confused.  Patient is alert awake pleasant cooperative shaking hands and answer all my questions he could not explain me that how he missed the dialysis for a week but he reports that he does not want to get sick he wants to get better.  He is not confused he was able to tell me that he missed the dialysis and he got sick and he is admitted at hospital for the treatment he was able to tell me the name of the hospital stating that he is in ICU and he is on dialysis right now.  Patient is able to tell me his age date of birth currently her current month name of the president and he is fully aware of his problems he was able to tell me that he is diabetic and he is on dialysis since couple of years he goes to see his nephrologist Dr. Landa regularly and he takes his medications.  I reviewed his home medications.  Currently patient is not on any depression medications patient reports feeling anxious and trouble in sleeping at times and somatic complaints feeling weak and tired otherwise he has no other complaint he denies feeling depressed.  After evaluating the patient this is my clinical judgment and professional opinion that patient is mentally capable to understand his problems limitations and needs and he is fully mentally competent to make the best decision for his welfare and finances at this time.  After the evaluation I discussed with the nurse and ICU staff and reported my impression.  I  reviewed the neurology notes by Orestes Lopes.  Patient is able to give out basic background information and his daily life.  Patient is  15 years but he is a good friend to his ex-wife he has 2 grownup daughters he gets along with them.  He does not have a girlfriend at present time.    Social history patient is  since 14 years after  for 7 years he has 2 daughters he denies smoking denies abusing alcohol or drugs he denies history of drugs and alcohol abuse he has 12th grade education and he was working as a  but he is retired 8 years ago.    Diagnosis  Major depression recurrent  Anxiety  Insomnia  History of noncompliance of the treatment  CKD end-stage renal disease diabetes mellitus hypertension hyperlipidemia left toe amputee history of TIA  IR lower extremity angiogram IR temporary dialysis catheter placement    Chief Complaints: Evaluate for mental competency.    Family History: History reviewed. No pertinent family history.  Patient denies    Legal History: Patient denies    Mental Status Exam: In bed in ICU bed for on dialysis pleasant cooperative not confused shakes hands and answer all my questions he is oriented to place person and time memory intact anxious somatic complaints of feeling tired denies feeling depressed insomnia.  Patient denies auditory or visual hallucinations.  Patient denies suicidal or homicidal ideation.  No paranoia no delusion elucidated poor concentration.  Insight and judgment are adequate.      History of Present Illness     HPI: aNresh Carpio is a 64 y.o. year old male who presents with patient fall at home.    Inpatient consult to Psychiatry  Consult performed by: Regine Arboleda MD  Consult ordered by: GRANT Gould            Historical Information   Past Psychiatric History: Patient seems depressed even though he is pleasant and cooperative smiling but he reports feeling anxious and trouble in sleeping he denies feeling  depressed but patient seems to be in denial of his depression he has symptoms of depression including anxious depressed somatic complaints feeling tired unmotivated even though he is reported noncompliance of the treatment most probably he is unmotivated and take it granted he missed the dialysis for a week he could not explain me what happened to him and how come he missed for several long but he was able to understand that if he does not go to dialysis he gets sick.  He denies seeing a psychiatrist he does not remember taking any anxiety depression medicine from the family physician currently he is not on any depression anxiety or sleeping medicine.  Patient denies psychiatric admissions in the past patient denies suicide attempts in the past patient denies history of drugs and alcohol abuse patient denies legal problems in the past.  Past Medical History:   Diagnosis Date    CKD     Diabetes mellitus (HCC)     Hyperlipidemia     Hypertension     Left toe amputee (HCC)     TIA (transient ischemic attack)      Past Surgical History:   Procedure Laterality Date    AMPUTATION Left     left foot resection 10 years ago dr tran    IR LOWER EXTREMITY ANGIOGRAM  08/29/2023    IR TEMPORARY DIALYSIS CATHETER PLACEMENT  08/25/2023    IR TUNNELED CENTRAL LINE REMOVAL  08/23/2023    IR TUNNELED DIALYSIS CATHETER PLACEMENT  01/27/2022    IR TUNNELED DIALYSIS CATHETER PLACEMENT  08/30/2023    MN AMPUTATION METATARSAL W/TOE SINGLE Right 8/31/2023    Procedure: RIGHT PARTIAL 1ST RAY RESECTION WITH  WOUND VAC APPLICATION;  Surgeon: Won Parmar DPM;  Location: [facility];  Service: Podiatry     Social History   Social History     Substance and Sexual Activity   Alcohol Use Not Currently    Alcohol/week: 0.0 standard drinks of alcohol    Comment: 0     Social History     Substance and Sexual Activity   Drug Use Never     Social History     Tobacco Use   Smoking Status Never    Passive exposure: Never   Smokeless Tobacco Never  "      Meds/Allergies   current meds:   Current Facility-Administered Medications:     acetaminophen (Ofirmev) injection 1,000 mg, Q6H PRN, Last Rate: 1,000 mg (10/10/24 0604)    allopurinol (ZYLOPRIM) tablet 100 mg, Daily    atorvastatin (LIPITOR) tablet 40 mg, Daily With Dinner    chlorhexidine (PERIDEX) 0.12 % oral rinse 15 mL, Q12H KEMAL    docusate sodium (COLACE) capsule 100 mg, BID    influenza vaccine, high-dose (Fluzone High-Dose) IM injection 0.5 mL, Once PRN    insulin lispro (HumALOG/ADMELOG) 100 units/mL subcutaneous injection 1-6 Units, TID AC **AND** Fingerstick Glucose (POCT), TID AC    labetalol (NORMODYNE) injection 10 mg, Q4H PRN    pantoprazole (PROTONIX) injection 40 mg, Q12H KEMAL    polyethylene glycol (MIRALAX) packet 17 g, Daily PRN    sodium hypochlorite (DAKIN'S HALF-STRENGTH) 0.25 percent topical solution 1 Application, Daily and PTA meds:    Medications Prior to Admission:     acetaminophen (TYLENOL) 325 mg tablet    allopurinol (ZYLOPRIM) 100 mg tablet    mupirocin (BACTROBAN) 2 % ointment    NIFEdipine (PROCARDIA XL) 30 mg 24 hr tablet    atorvastatin (LIPITOR) 80 mg tablet    docusate sodium (COLACE) 100 mg capsule    enoxaparin (LOVENOX) 30 mg/0.3 mL    gabapentin (NEURONTIN) 100 mg capsule    naloxone (NARCAN) 4 mg/0.1 mL nasal spray    polyethylene glycol (MIRALAX) 17 g packet    sevelamer (RENAGEL) 800 mg tablet  No Known Allergies    Objective   Vitals: Blood pressure 155/68, pulse 64, temperature 97.6 °F (36.4 °C), temperature source Temporal, resp. rate 20, height 6' 1\" (1.854 m), weight 104 kg (229 lb 8 oz), SpO2 98%.      Routine Lab Results:   No results displayed because visit has over 200 results.            Diagnosis: Major depression recurrent  Anxiety  Insomnia  Noncompliance of the medical treatment.    Plan: To the best of my knowledge patient is mentally capable to understand his problem limitations and needs he is able to understand the consequences without the " recommended medical care including being sick and may die at this time this is my clinical judgment and professional opinion that patient is mentally competent to make the best decision for his welfare and finances.  Though patient has history of noncompliance of his medical care that does not make him mentally incompetent when I explained him he was able to understand that he does need to comply with his medical care including dialysis otherwise he can get sick and it could be serious including death.  Patient does have underlying depression and I will start him on Lexapro 10 mg daily.  Patient will benefit from outpatient counseling to keep him up to date to comply with his medical care after the discharge.  Discussed with the nurse and ICU team.  I will follow-up.    Regine Arboleda MD

## 2024-10-10 NOTE — ASSESSMENT & PLAN NOTE
Gaitan placed yesterday for urinary retention. Patient requested D/C gaitan overnight. Prior to D/C'ing gaitan, urine was clear, yellow  Patient now with hematuria and passing clots into urinal   Denies complaints  Continue to monitor  Urinary retention protocol   Consider urology consult

## 2024-10-11 LAB
ABO GROUP BLD BPU: NORMAL
ANION GAP SERPL CALCULATED.3IONS-SCNC: 12 MMOL/L (ref 4–13)
BASOPHILS # BLD AUTO: 0.02 THOUSANDS/ΜL (ref 0–0.1)
BASOPHILS NFR BLD AUTO: 0 % (ref 0–1)
BPU ID: NORMAL
BUN SERPL-MCNC: 30 MG/DL (ref 5–25)
CALCIUM SERPL-MCNC: 8.4 MG/DL (ref 8.4–10.2)
CHLORIDE SERPL-SCNC: 94 MMOL/L (ref 96–108)
CO2 SERPL-SCNC: 26 MMOL/L (ref 21–32)
CREAT SERPL-MCNC: 5.25 MG/DL (ref 0.6–1.3)
CROSSMATCH: NORMAL
EOSINOPHIL # BLD AUTO: 0.26 THOUSAND/ΜL (ref 0–0.61)
EOSINOPHIL NFR BLD AUTO: 4 % (ref 0–6)
ERYTHROCYTE [DISTWIDTH] IN BLOOD BY AUTOMATED COUNT: 17.2 % (ref 11.6–15.1)
GFR SERPL CREATININE-BSD FRML MDRD: 10 ML/MIN/1.73SQ M
GLUCOSE SERPL-MCNC: 100 MG/DL (ref 65–140)
GLUCOSE SERPL-MCNC: 106 MG/DL (ref 65–140)
GLUCOSE SERPL-MCNC: 110 MG/DL (ref 65–140)
GLUCOSE SERPL-MCNC: 115 MG/DL (ref 65–140)
GLUCOSE SERPL-MCNC: 118 MG/DL (ref 65–140)
HCT VFR BLD AUTO: 24.1 % (ref 36.5–49.3)
HGB BLD-MCNC: 7.9 G/DL (ref 12–17)
IMM GRANULOCYTES # BLD AUTO: 0.02 THOUSAND/UL (ref 0–0.2)
IMM GRANULOCYTES NFR BLD AUTO: 0 % (ref 0–2)
LYMPHOCYTES # BLD AUTO: 0.8 THOUSANDS/ΜL (ref 0.6–4.47)
LYMPHOCYTES NFR BLD AUTO: 12 % (ref 14–44)
MAGNESIUM SERPL-MCNC: 2 MG/DL (ref 1.9–2.7)
MCH RBC QN AUTO: 29.5 PG (ref 26.8–34.3)
MCHC RBC AUTO-ENTMCNC: 32.8 G/DL (ref 31.4–37.4)
MCV RBC AUTO: 90 FL (ref 82–98)
MONOCYTES # BLD AUTO: 0.66 THOUSAND/ΜL (ref 0.17–1.22)
MONOCYTES NFR BLD AUTO: 10 % (ref 4–12)
NEUTROPHILS # BLD AUTO: 4.75 THOUSANDS/ΜL (ref 1.85–7.62)
NEUTS SEG NFR BLD AUTO: 74 % (ref 43–75)
NRBC BLD AUTO-RTO: 0 /100 WBCS
PHOSPHATE SERPL-MCNC: 5.4 MG/DL (ref 2.3–4.1)
PLATELET # BLD AUTO: 207 THOUSANDS/UL (ref 149–390)
PMV BLD AUTO: 10.2 FL (ref 8.9–12.7)
POTASSIUM SERPL-SCNC: 4.6 MMOL/L (ref 3.5–5.3)
RBC # BLD AUTO: 2.68 MILLION/UL (ref 3.88–5.62)
SODIUM SERPL-SCNC: 132 MMOL/L (ref 135–147)
UNIT DISPENSE STATUS: NORMAL
UNIT PRODUCT CODE: NORMAL
UNIT PRODUCT VOLUME: 350 ML
UNIT RH: NORMAL
WBC # BLD AUTO: 6.51 THOUSAND/UL (ref 4.31–10.16)

## 2024-10-11 PROCEDURE — 80048 BASIC METABOLIC PNL TOTAL CA: CPT | Performed by: NURSE PRACTITIONER

## 2024-10-11 PROCEDURE — 85025 COMPLETE CBC W/AUTO DIFF WBC: CPT | Performed by: NURSE PRACTITIONER

## 2024-10-11 PROCEDURE — 82948 REAGENT STRIP/BLOOD GLUCOSE: CPT

## 2024-10-11 PROCEDURE — 97163 PT EVAL HIGH COMPLEX 45 MIN: CPT

## 2024-10-11 PROCEDURE — 99232 SBSQ HOSP IP/OBS MODERATE 35: CPT

## 2024-10-11 PROCEDURE — 97167 OT EVAL HIGH COMPLEX 60 MIN: CPT

## 2024-10-11 PROCEDURE — NC001 PR NO CHARGE: Performed by: INTERNAL MEDICINE

## 2024-10-11 PROCEDURE — 84100 ASSAY OF PHOSPHORUS: CPT | Performed by: NURSE PRACTITIONER

## 2024-10-11 PROCEDURE — 97530 THERAPEUTIC ACTIVITIES: CPT

## 2024-10-11 PROCEDURE — 83735 ASSAY OF MAGNESIUM: CPT | Performed by: NURSE PRACTITIONER

## 2024-10-11 RX ORDER — GABAPENTIN 100 MG/1
200 CAPSULE ORAL 2 TIMES DAILY
Status: DISCONTINUED | OUTPATIENT
Start: 2024-10-11 | End: 2024-10-12

## 2024-10-11 RX ADMIN — DOCUSATE SODIUM 100 MG: 100 CAPSULE, LIQUID FILLED ORAL at 17:33

## 2024-10-11 RX ADMIN — ALLOPURINOL 100 MG: 100 TABLET ORAL at 08:49

## 2024-10-11 RX ADMIN — ESCITALOPRAM OXALATE 10 MG: 10 TABLET ORAL at 08:49

## 2024-10-11 RX ADMIN — ATORVASTATIN CALCIUM 40 MG: 40 TABLET, FILM COATED ORAL at 16:28

## 2024-10-11 RX ADMIN — PANTOPRAZOLE SODIUM 40 MG: 40 INJECTION, POWDER, FOR SOLUTION INTRAVENOUS at 08:50

## 2024-10-11 RX ADMIN — ONDANSETRON 4 MG: 2 INJECTION INTRAMUSCULAR; INTRAVENOUS at 00:07

## 2024-10-11 RX ADMIN — NIFEDIPINE 30 MG: 30 TABLET, EXTENDED RELEASE ORAL at 17:33

## 2024-10-11 RX ADMIN — GABAPENTIN 200 MG: 100 CAPSULE ORAL at 08:46

## 2024-10-11 RX ADMIN — HYOSCYAMINE SULFATE 1 APPLICATION: 16 SOLUTION at 09:01

## 2024-10-11 RX ADMIN — GABAPENTIN 200 MG: 100 CAPSULE ORAL at 17:33

## 2024-10-11 RX ADMIN — DOCUSATE SODIUM 100 MG: 100 CAPSULE, LIQUID FILLED ORAL at 08:49

## 2024-10-11 RX ADMIN — PANTOPRAZOLE SODIUM 40 MG: 40 INJECTION, POWDER, FOR SOLUTION INTRAVENOUS at 20:25

## 2024-10-11 NOTE — ASSESSMENT & PLAN NOTE
Lab Results   Component Value Date    EGFR 10 10/11/2024    EGFR 6 10/10/2024    EGFR 7 10/09/2024    CREATININE 5.25 (H) 10/11/2024    CREATININE 7.55 (H) 10/10/2024    CREATININE 7.12 (H) 10/09/2024     Patient gets dialysis Tuesday, Thursday, Saturday and has missed dialysis for about a week since being discharged from short-term rehab.  Patient was scheduled for dialysis at a different facility but family stated that they cannot transport him for his appointments  Nephrology following  S/p HD on 10/8 and 10/10  Plan for dialysis tomorrow  Continue Renagel for hyperphosphatemia

## 2024-10-11 NOTE — PLAN OF CARE
Problem: PHYSICAL THERAPY ADULT  Goal: Performs mobility at highest level of function for planned discharge setting.  See evaluation for individualized goals.  Description: Treatment/Interventions: ADL retraining, Functional transfer training, LE strengthening/ROM, Therapeutic exercise, Endurance training, Gait training, Spoke to nursing          See flowsheet documentation for full assessment, interventions and recommendations.  Note: Prognosis: Good  Problem List: Decreased strength, Decreased endurance, Impaired balance, Decreased mobility, Decreased safety awareness, Pain, Decreased skin integrity  Assessment: Patient seen for Physical Therapy evaluation. Patient admitted with Toxic metabolic encephalopathy.  Comorbidities affecting patient's physical performance include: anemia, cardiac disease, diabetes, ESRD on hemodialysis, falls, hypertension, neuropathy, PVD, and TIA.  Personal factors affecting patient at time of initial evaluation include: lives in 1 story house, ambulating with assistive device, stairs to enter home, inability to navigate level surfaces without external assistance, limited home support, positive fall history, and inability to perform IADLS . Prior to admission, patient was independent with functional mobility without assistive device, independent with ADLS, independent with IADLS, living alone in 1 story home with 8 steps to enter, ambulating household distance, and ambulating community distances.  Please find objective findings from Physical Therapy assessment regarding body systems outlined above with impairments and limitations including impaired balance, decreased endurance, gait deviations, pain, decreased activity tolerance, decreased functional mobility tolerance, and fall risk.  The Barthel Index was used as a functional outcome tool presenting with a score of Barthel Index Score: 80 today indicating minimal limitations of functional mobility and ADLS.  Patient's clinical  presentation is currently unstable/unpredictable as seen in patient's presentation of changing level of pain, increased fall risk, new onset of impairment of functional mobility, and decreased endurance. Pt would benefit from continued Physical Therapy treatment to address deficits as defined above and maximize level of functional mobility. As demonstrated by objective findings, the assigned level of complexity for this evaluation is high.The patient's AM-PAC Basic Mobility Inpatient Short Form Raw Score is 20. A Raw score of greater than 16 suggests the patient may benefit from discharge to home. Please also refer to the recommendation of the Physical Therapist for safe discharge planning.        Rehab Resource Intensity Level, PT: III (Minimum Resource Intensity) (Home vs. str pending progress with stairs)    See flowsheet documentation for full assessment.

## 2024-10-11 NOTE — ASSESSMENT & PLAN NOTE
RR called 10/8 for acute change in mental status with last well known 12:58, stroke alert called 1:16 pm  NIH 8 and decision was made to give TNK after initial CT head negative for bleed  CTA head/neck showed intracranial ICA atherosclerosis - outpatient vascular surgery referral  MRI brain without evidence of acute CVA  Suspect metabolic encephalopathy in setting of ESRD with missed dialysis  Patient back at baseline mentation  Continue to monitor

## 2024-10-11 NOTE — ASSESSMENT & PLAN NOTE
Lab Results   Component Value Date    HGBA1C 5.5 10/08/2024       Recent Labs     10/10/24  2018 10/11/24  0719 10/11/24  1048 10/11/24  1602   POCGLU 119 100 115 106   [facility]  Diabetic diet

## 2024-10-11 NOTE — PROGRESS NOTES
Progress Note - Hospitalist   Name: Naresh Carpio 64 y.o. male I MRN: [id number]  Unit/Bed#: 4 [place] [address]-01 I Date of Admission: 10/8/2024   Date of Service: 10/11/2024 I Hospital Day: 2    Assessment & Plan  Toxic metabolic encephalopathy  RR called 10/8 for acute change in mental status with last well known 12:58, stroke alert called 1:16 pm  NIH 8 and decision was made to give TNK after initial CT head negative for bleed  CTA head/neck showed intracranial ICA atherosclerosis - outpatient vascular surgery referral  MRI brain without evidence of acute CVA  Suspect metabolic encephalopathy in setting of ESRD with missed dialysis  Patient back at baseline mentation  Continue to monitor  ESRD (end stage renal disease) ([facility])  Lab Results   Component Value Date    EGFR 10 10/11/2024    EGFR 6 10/10/2024    EGFR 7 10/09/2024    CREATININE 5.25 (H) 10/11/2024    CREATININE 7.55 (H) 10/10/2024    CREATININE 7.12 (H) 10/09/2024     Patient gets dialysis Tuesday, Thursday, Saturday and has missed dialysis for about a week since being discharged from short-term rehab.  Patient was scheduled for dialysis at a different facility but family stated that they cannot transport him for his appointments  Nephrology following  S/p HD on 10/8 and 10/10  Plan for dialysis tomorrow  Continue Renagel for hyperphosphatemia  Hematuria  Patient initially had gaitan placed for urinary retention, requested it be removed  Noted to have hematuria after gaitan removal with clots  Urinary retention protocol  Aspirin and DVT prophylaxis currently on held  Urology consulted  Acute on chronic anemia  Hemoglobin 10.2 on admission, noted to drop to 6.7 after TNK administration  Patient initially declined CT a/p and blood transfusion, eventually agreeable to 1 unit PRBC on 10/10  Hgb improved and stable  Noted to have ongoing hematuria, urology consulted  Trend cbc  Type 2 diabetes mellitus, without long-term current use of insulin ([facility])  Lab Results    Component Value Date    HGBA1C 5.5 10/08/2024       Recent Labs     10/10/24  2018 10/11/24  0719 10/11/24  1048 10/11/24  1602   POCGLU 119 100 115 106   [facility]  Diabetic diet  Status post fall  CT head, CT c-spine, and XR R knee negative for acute abnormality on admission  PT/OT  Fall precautions  Hyperkalemia  In setting of ESRD and missed dialysis  Currently improved    VTE Pharmacologic Prophylaxis: VTE Score: 2 Low Risk (Score 0-2) - Encourage Ambulation.    Mobility:   Basic Mobility Inpatient Raw Score: 20  JH-HLM Goal: 6: Walk 10 steps or more  JH-HLM Achieved: 7: Walk 25 feet or more  JH-HLM Goal achieved. Continue to encourage appropriate mobility.    Patient Centered Rounds: I performed bedside rounds with nursing staff today.   Discussions with Specialists or Other Care Team Provider: rn, cm    Education and Discussions with Family / Patient: Patient declined call to .     Current Length of Stay: 2 day(s)  Current Patient Status: Inpatient   Certification Statement: The patient will continue to require additional inpatient hospital stay due to anemia, hematuria  Discharge Plan: Anticipate discharge in 48-72 hrs to discharge location to be determined pending rehab evaluations.    Code Status: Level 1 - Full Code    Subjective   Patient reports he walked with therapy this morning and felt good. Denies any current symptoms but reports his urine is bloody again with some clots in it. Agreeable to urology consult.    Objective :  Temp:  [97 °F (36.1 °C)-98.6 °F (37 °C)] 97.8 °F (36.6 °C)  HR:  [66-73] 73  BP: (133-199)/() 133/50  Resp:  [14-52] 16  SpO2:  [93 %-99 %] 93 %  O2 Device: None (Room air)    Body mass index is 30.25 kg/m².     Input and Output Summary (last 24 hours):     Intake/Output Summary (Last 24 hours) at 10/11/2024 1606  Last data filed at 10/11/2024 1301  Gross per 24 hour   Intake 110 ml   Output 625 ml   Net -515 ml       Physical Exam  Vitals and nursing note  reviewed.   Constitutional:       General: He is not in acute distress.     Appearance: He is well-developed.   Cardiovascular:      Rate and Rhythm: Normal rate and regular rhythm.   Pulmonary:      Effort: Pulmonary effort is normal. No respiratory distress.      Breath sounds: Normal breath sounds.   Abdominal:      Palpations: Abdomen is soft.      Tenderness: There is no abdominal tenderness.   Genitourinary:     Comments: Punch colored urine with clots noted in bedside urinal  Musculoskeletal:         General: No swelling.   Skin:     General: Skin is warm and dry.      Capillary Refill: Capillary refill takes less than 2 seconds.   Neurological:      General: No focal deficit present.      Mental Status: He is alert and oriented to person, place, and time.   Psychiatric:         Mood and Affect: Mood normal.       Lines/Drains:  Lines/Drains/Airways       Active Status       Name Placement date Placement time Site Days    HD Permanent Double Catheter --  --  Internal jugular  --                      Telemetry:  Telemetry Orders (From admission, onward)               24 Hour Telemetry Monitoring  Continuous x 24 Hours (Telem)        Question:  Reason for 24 Hour Telemetry  Answer:  Metabolic/electrolyte disturbance with high probability of dysrhythmia. K level <3 or >6 OR KCL infusion >10mEq/hr                     Telemetry Reviewed:  currently NSR  Indication for Continued Telemetry Use: No indication for continued use. Will discontinue.                Lab Results: I have reviewed the following results:   Results from last 7 days   Lab Units 10/11/24  0510   WBC Thousand/uL 6.51   HEMOGLOBIN g/dL 7.9*   HEMATOCRIT % 24.1*   PLATELETS Thousands/uL 207   SEGS PCT % 74   LYMPHO PCT % 12*   MONO PCT % 10   EOS PCT % 4     Results from last 7 days   Lab Units 10/11/24  0510 10/08/24  1407 10/08/24  0454   SODIUM mmol/L 132*   < > 138   POTASSIUM mmol/L 4.6   < > 5.8*   CHLORIDE mmol/L 94*   < > 106   CO2 mmol/L 26    < > 16*   BUN mg/dL 30*   < > 94*   CREATININE mg/dL 5.25*   < > 11.08*   ANION GAP mmol/L 12   < > 16*   CALCIUM mg/dL 8.4   < > 8.3*   ALBUMIN g/dL  --   --  4.2   TOTAL BILIRUBIN mg/dL  --   --  0.52   ALK PHOS U/L  --   --  100   ALT U/L  --   --  39   AST U/L  --   --  25   GLUCOSE RANDOM mg/dL 110   < > 139    < > = values in this interval not displayed.         Results from last 7 days   Lab Units 10/11/24  1602 10/11/24  1048 10/11/24  0719 10/10/24  2018 10/10/24  1535 10/10/24  1138 10/10/24  0611 10/09/24  2003 10/09/24  1604 10/09/24  1202 10/09/24  0550 10/08/24  2327   POC GLUCOSE mg/dl 106 115 100 119 104 104 85 123 114 89 106 122     Results from last 7 days   Lab Units 10/08/24  0839   HEMOGLOBIN A1C % 5.5           Recent Cultures (last 7 days):         Imaging Results Review: I reviewed radiology reports from this admission including: CT head and MRI brain.  Other Study Results Review: No additional pertinent studies reviewed.    Last 24 Hours Medication List:     Current Facility-Administered Medications:     acetaminophen (Ofirmev) injection 1,000 mg, Q6H PRN, Last Rate: 1,000 mg (10/10/24 1951)    allopurinol (ZYLOPRIM) tablet 100 mg, Daily    atorvastatin (LIPITOR) tablet 40 mg, Daily With Dinner    docusate sodium (COLACE) capsule 100 mg, BID    escitalopram (LEXAPRO) tablet 10 mg, Daily    gabapentin (NEURONTIN) capsule 200 mg, BID    hydrALAZINE (APRESOLINE) injection 10 mg, Q6H PRN    influenza vaccine, high-dose (Fluzone High-Dose) IM injection 0.5 mL, Once PRN    insulin lispro (HumALOG/ADMELOG) 100 units/mL subcutaneous injection 1-6 Units, TID AC **AND** Fingerstick Glucose (POCT), TID AC    labetalol (NORMODYNE) injection 10 mg, Q4H PRN    NIFEdipine (PROCARDIA XL) 24 hr tablet 30 mg, After Dinner    pantoprazole (PROTONIX) injection 40 mg, Q12H KEMAL    polyethylene glycol (MIRALAX) packet 17 g, Daily PRN    sodium hypochlorite (DAKIN'S HALF-STRENGTH) 0.25 percent topical solution 1  Application, Daily    Administrative Statements   Today, Patient Was Seen By: Nallely Pisano PA-C    **Please Note: This note may have been constructed using a voice recognition system.**

## 2024-10-11 NOTE — PROGRESS NOTES
Came to see the patient for follow-up patient is moved out of the ICU patient is seen on the floor he is alert awake oriented x 3 cooperative remember talking to me yesterday he reports that he is living at the place where his sister rents that place I ask him again why he missed the dialysis for 1 week he could not explain me but he was talking something about his sister and brother-in-law he depend on them.  He has another sister that '' I do not talk to her'' he has a good relationship with his ex-wife.  Also his 2 daughters are good to him.  Patient is explained and he understood that missing the dialysis will make him sick and eventually he may die without it he clearly able to understood that and he reports that he will not do that again anymore because he does not want to die he wants to live.  No behavioral problems reported patient is encouraged to continue his medication and comply with the medical care after the discharge.  Patient is participating in physical therapy.  Patient is pleasant cooperative not confused able to answer my questions properly patient remained mentally competent.  Patient is not agitated not psychotic clearly denies feeling suicidal no hallucination.  Patient is going through the anxiety insomnia and underlying depression but he lives in denial.  He is tolerating medication Lexapro well.  Once patient is medically stable he will be discharged possibly to the short-term rehab or home.  Nephrology notes reviewed and noted.  Therapy is done with good response.  Continue follow-up.

## 2024-10-11 NOTE — QUICK NOTE
Chart reviewed.  Vitals reviewed.  Labs reviewed.  Patient had hemodialysis session yesterday.  Next hemodialysis session will be tomorrow.  Hemoglobin has improved posttransfusion.  Next nephrology follow-up will be tomorrow.  Please call with questions in interim.

## 2024-10-11 NOTE — CASE MANAGEMENT
Case Management Discharge Planning Note    Patient name Naresh Carpio  Location [address] [place] [address] 414/4 [place] 414-* MRN [id number]  : 1960 Date 10/11/2024       Current Admission Date: 10/8/2024  Current Admission Diagnosis:Toxic metabolic encephalopathy   Patient Active Problem List    Diagnosis Date Noted Date Diagnosed    Hematuria 10/10/2024     Falls 10/08/2024     Toxic metabolic encephalopathy 10/08/2024     PVD (peripheral vascular disease) ([facility]) 10/08/2024     Closed fracture of right pelvis ([facility]ly [facility]) 2024     Bilateral sacral fracture, closed ([facility]ly [facility]) 2024     Hyperkalemia 2024     Difficulty with speech 2024     History of amputation of hallux ([facility]ly [facility]) 2024     Hypertensive urgency 2024     Facial cellulitis 2024     Constipation 2023     Bradycardia 2023     Cellulitis of right foot      Polymicrobial bacterial infection 2023     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 mellitus, with necrosis of bone ([facility])      Acquired deformity of foot, right      Charcot's joint      Subacute osteomyelitis of right foot ([facility])      Open wound of right foot 2023     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 mellitus, with bone involvement without evidence of necrosis ([facility])      Diabetic ulcer of left midfoot associated with type 2 diabetes mellitus, limited to breakdown of skin ([facility])      Diabetic polyneuropathy associated with type 2 diabetes mellitus ([facility])      Diabetes mellitus type 2 with peripheral artery disease ([facility])      History of amputation of lesser toe of left foot ([facility])      Onychomycosis      Status post fall 2023     Traumatic rhabdomyolysis ([facility]) 2023     Leukocytosis 2023     Osteoarthritis of left hip 2022     Severe Left Orchalgia 2022     Right shoulder pain 2022     Left hip pain 2022     Hemodialysis status ([facility])      Hypervolemia      Acute on chronic  anemia 01/23/2022     ESRD (end stage renal disease) (HCC) 01/21/2022     Type 2 diabetes mellitus, without long-term current use of insulin (HCC)      Hypertension      History of TIA (transient ischemic attack)      T10 vertebral fracture (HCC)        LOS (days): 2  Geometric Mean LOS (GMLOS) (days): 3.6  Days to GMLOS:1.5     OBJECTIVE:  Risk of Unplanned Readmission Score: 26.5      Current admission status: Inpatient   Preferred Pharmacy:   [id number] - [place], [place] - [address] [address]  [address] [address]  Phone: [phone] Fax: [phone]    Primary Care Provider: No primary care provider on file.    Primary Insurance: MEDICARE  Secondary Insurance:     DISCHARGE DETAILS:    Discharge planning discussed with:: Patient  Freedom of Choice: Yes  Comments - Freedom of Choice: SW met bedside with patient to continue discussion of discharge plan. At this time PT is recommending Level III rehab. Patient states he is much weaker than baseline and is concerned about ability to complete stairs to safely access home. Patient states he wants to go to STR and would prefer to go [facility]. SW advised that [facility] likely will not transport patient to/from HD in [place] and that this may be a barrier to placement there. Patient verbalized understanding and stated he would consider going to a STR facility in [facility] if he had to, but would rather change HD clinics if it meant he could go to [facility]. Patient consenting to sending blanket referrals to local STR and [facility].  CM contacted family/caregiver?: Yes ( left for sister Diana requesting callback to discuss discharge plan.)  Were Treatment Team discharge recommendations reviewed with patient/caregiver?: Yes  Did patient/caregiver verbalize understanding of patient care needs?: Yes  Were patient/caregiver advised of the risks associated with not following Treatment Team discharge  recommendations?: Yes    Contacts  Patient Contacts: Alicja Carpio (sister)  Relationship to Patient:: Family  Contact Method: Phone  Phone Number: [phone]  Reason/Outcome: Emergency Contact    Other Referral/Resources/Interventions Provided:  Interventions: Short Term Rehab  Referral Comments: STR referrals sent in [place]. Responses pending.

## 2024-10-11 NOTE — NURSING NOTE
A&OX4;Pt insisted that he has to sit with his feet down at the side of the bed;stated that he fell other times due to various other reasons and stated that he wouldn' stand up.Pt refused to take any medications to help him feel comfortable/sleep.Fall precautions reinforced;pt verbalised understanding.

## 2024-10-11 NOTE — PLAN OF CARE
Problem: Potential for Falls  Goal: Patient will remain free of falls  Description: INTERVENTIONS:  - Educate patient/family on patient safety including physical limitations  - Instruct patient to call for assistance with activity   - Consult OT/PT to assist with strengthening/mobility   - Keep Call bell within reach  - Keep bed low and locked with side rails adjusted as appropriate  - Keep care items and personal belongings within reach  - Initiate and maintain comfort rounds  - Make Fall Risk Sign visible to staff  - Offer Toileting every 2 Hours, in advance of need  - Initiate/Maintain bed alarm  - Obtain necessary fall risk management equipment: fall risk sign on door  - Apply yellow socks and bracelet for high fall risk patients  - Consider moving patient to room near nurses station  Outcome: Progressing

## 2024-10-11 NOTE — OCCUPATIONAL THERAPY NOTE
Occupational Therapy Evaluation       10/11/24 1140   OT Last Visit   OT Visit Date 10/11/24   Note Type   Note type Evaluation   Pain Assessment   Pain Assessment Tool 0-10   Pain Score 1   Pain Location/Orientation Orientation: Right;Location: Knee  (At rest)   Restrictions/Precautions   Other Precautions Contact/isolation;Chair Alarm;Bed Alarm;Fall Risk   Home Living   Type of Home Apartment   Home Layout One level;Stairs to enter with rails;Performs ADLs on one level  (8-9 NAZARIO)   Bathroom Shower/Tub   (Sponge bathes at baseline)   Bathroom Toilet Standard   Bathroom Equipment Other (Comment)  (None)   Home Equipment Walker   Prior Function   Level of [place] Independent with ADLs;Independent with functional mobility;Independent with IADLS   Lives With Alone   Receives Help From Family   IADLs Independent with driving;Independent with meal prep;Independent with medication management   Falls in the last 6 months 1 to 4   Comments Patient reports at baseline he is independent in ADLs and mobility; Recently discharged from [facility] where he was using a RW for mobility   General   Additional Pertinent History Patient presented to hospital s/p fall outside of hospital with knee pain; all imaging negative; of note patient discharged from [facility] approx 1 week prior to hospital admission   ADL   Eating Assistance 7  Independent   Grooming Assistance 5  Supervision/Setup   Grooming Deficit Wash/dry hands  (Standing to sink)   UB Bathing Assistance 5  Supervision/Setup   LB Bathing Assistance 4  Minimal Assistance   UB Dressing Assistance 5  Supervision/Setup   LB Dressing Assistance 4  Minimal Assistance   LB Dressing Deficit Don/doff R sock;Don/doff L sock  (Increased assist needed for RLE due to increased knee pain)   Toileting Assistance  5  Supervision/Setup   Bed Mobility   Supine to Sit 7  Independent   Sit to Supine 7  Independent   Transfers   Sit to Stand 5  Supervision   Additional items Assist x 1;Verbal cues    Stand to Sit 5  Supervision   Additional items Assist x 1;Verbal cues   Toilet transfer 5  Supervision   Additional items   (Simulated; patient refused to perform transfer on actual toilet, no reason state when asked; simulated toilet transfer at bedside chair with supervision with use of RW)   Functional Mobility   Functional Mobility 5  Supervision   Additional Comments Patient ambulated short household distance to/from bathroom with RW   Balance   Static Sitting Normal   Dynamic Sitting Good   Static Standing Fair +   Dynamic Standing Fair   Activity Tolerance   Activity Tolerance Patient limited by pain   RUE Assessment   RUE Assessment WFL   LUE Assessment   LUE Assessment WFL   Cognition   Overall Cognitive Status WFL   Arousal/Participation Alert;Cooperative   Attention Within functional limits   Orientation Level Oriented X4   Following Commands Follows multistep commands with increased time or repetition   Assessment   Limitation Decreased ADL status;Decreased UE strength;Decreased Safe judgement during ADL;Decreased endurance;Decreased self-care trans;Decreased high-level ADLs   Prognosis Good   Assessment Patient evaluated by Occupational Therapy.  Patient admitted with Toxic metabolic encephalopathy.  The patients occupational profile, medical and therapy history includes a extensive additional review of physical, cognitive, or psychosocial history related to current functional performance.  Comorbidities affecting functional mobility and ADLS include: DM, HTN, CKD, TIA, HLD.  Prior to admission, patient was independent with functional mobility with RW, independent with ADLS, and independent with IADLS.  The evaluation identifies the following performance deficits: weakness, impaired balance, decreased endurance, decreased coordination, new onset of impairment of functional mobility, decreased ADLS, decreased IADLS, pain, decreased activity tolerance, decreased safety awareness, impaired judgement, and  decreased strength, that result in activity limitations and/or participation restrictions. This evaluation requires clinical decision making of high complexity, because the patient presents with comorbidites that affect occupational performance and required significant modification of tasks or assistance with consideration of multiple treatment options.  The Barthel Index was used as a functional outcome tool presenting with a score of Barthel Index Score: 65, indicating moderate limitations of functional mobility and ADLS.  The patient's raw score on the AM-PAC Daily Activity Inpatient Short Form is 20. A raw score of greater than or equal to 19 suggests the patient may benefit from discharge to home. Please refer to the recommendation of the Occupational Therapist for safe discharge planning. Patient will benefit from skilled Occupational Therapy services to address above deficits and facilitate a safe return to prior level of function.   Goals   Patient Goals 'To get stronger'   STG Time Frame   (1-7 days)   Short Term Goal  Goals established to promote Patient Goals: 'To get stronger': Grooming: independent standing at sink; Bathing: supervision; Upper Body Dressing independent; Lower Body Dressing: supervision; Toileting: independent; Patient will increase standing tolerance to 10 minutes during ADL task to decrease assistance level and decrease fall risk; Patient will tolerate 10 minutes of UE ROM/strengthening to increase general activity tolerance and performance in ADLS/IADLS; Patient will improve functional activity tolerance to 10 minutes of sustained functional tasks to increase participation in basic self-care and decrease assistance level;  Patient will be able to to verbalize understanding and perform energy conservation/proper body mechanics during ADLS and functional mobility at least 75% of the time with minimal cueing to decrease signs of fatigue and increase stamina to return to prior level of  function; Patient will increase static/dynamic standing balance to fair+ to improve postural stability and decrease fall risk during standing ADLS and transfers.   LTG Time Frame   (8-14 days)   Long Term Goal Bathing: independent; Upper Body Dressing independent; Lower Body Dressing: independent; Toileting: independent; Patient will increase ambulatory standard toilet transfer to independent with rolling walker to increase performance and safety with ADLS and functional mobility; Patient will increase standing tolerance to 15 minutes during ADL task to decrease assistance level and decrease fall risk; Patient will increase functional mobility to and from bathroom with rolling walker independently to increase performance with ADLS and to use a toilet; Patient will tolerate 15 minutes of UE ROM/strengthening to increase general activity tolerance and performance in ADLS/IADLS; Patient will improve functional activity tolerance to 15 minutes of sustained functional tasks to increase participation in basic self-care and decrease assistance level;  Patient will be able to to verbalize understanding and perform energy conservation/proper body mechanics during ADLS and functional mobility at least 90% of the time with no cueing to decrease signs of fatigue and increase stamina to return to prior level of function; Patient will increase static/dynamic sitting balance to good to improve the ability to sit at edge of bed or on a chair for ADLS;  Patient will increase static/dynamic standing balance to good to improve postural stability and decrease fall risk during standing ADLS and transfers.  Pt will score >/= 24/24 on AM-PAC Daily Activity Inpatient scale to promote safe independence with ADLs and functional mobility; Pt will score >/= 100/100 on Barthel Index in order to decrease caregiver assistance needed and increase ability to perform ADLs and functional mobility.   Plan   Treatment Interventions ADL  retraining;Functional transfer training;UE strengthening/ROM;Endurance training;Patient/family training;Equipment evaluation/education;Compensatory technique education;Continued evaluation;Activityengagement;Energy conservation   Goal Expiration Date 10/25/24   OT Frequency 3-5x/wk   Discharge Recommendation   Rehab Resource Intensity Level, OT III (Minimum Resource Intensity)   AM-PAC Daily Activity Inpatient   Lower Body Dressing 3   Bathing 3   Toileting 3   Upper Body Dressing 3   Grooming 4   Eating 4   Daily Activity Raw Score 20   Daily Activity Standardized Score (Calc for Raw Score >=11) 42.03   AM-PAC Applied Cognition Inpatient   Following a Speech/Presentation 4   Understanding Ordinary Conversation 4   Taking Medications 4   Remembering Where Things Are Placed or Put Away 4   Remembering List of 4-5 Errands 4   Taking Care of Complicated Tasks 3   Applied Cognition Raw Score 23   Applied Cognition Standardized Score 53.08   Barthel Index   Feeding 10   Bathing 0   Grooming Score 0   Dressing Score 5   Bladder Score 10   Bowels Score 10   Toilet Use Score 5   Transfers (Bed/Chair) Score 10   Mobility (Level Surface) Score 10   Stairs Score 5   Barthel Index Score 65   Licensure   [place] License Number  Aletha Freedman, OTR/L [id number]

## 2024-10-11 NOTE — ASSESSMENT & PLAN NOTE
Hemoglobin 10.2 on admission, noted to drop to 6.7 after TNK administration  Patient initially declined CT a/p and blood transfusion, eventually agreeable to 1 unit PRBC on 10/10  Hgb improved and stable  Noted to have ongoing hematuria, urology consulted  Trend cbc

## 2024-10-11 NOTE — PHYSICAL THERAPY NOTE
PHYSICAL THERAPY EVALUATION/TREATMENT       10/11/24 1030   PT Last Visit   PT Visit Date 10/11/24   Note Type   Note type Evaluation   Pain Assessment   Pain Assessment Tool 0-10   Pain Score 2   Pain Location/Orientation Orientation: Right;Location: Knee   Pain Onset/Description Onset: Ongoing;Descriptor: Sore   Effect of Pain on Daily Activities limits mobility/activity tolerance   Patient's Stated Pain Goal No pain   Hospital Pain Intervention(s) Repositioned;Ambulation/increased activity   Multiple Pain Sites No   Restrictions/Precautions   Weight Bearing Precautions Per Order No   Other Precautions Bed Alarm;Chair Alarm;Fall Risk;Pain   Home Living   Type of Home Apartment   Home Layout One level;Performs ADLs on one level;Able to live on main level with bedroom/bathroom;Stairs to enter with rails  (8-9 NAZARIO)   Bathroom Toilet Standard   Home Equipment Walker   Prior Function   Level of [place] Independent with ADLs;Independent with functional mobility;Independent with IADLS  (+ drives himself to dialysis)   Lives With Alone   Receives Help From Family   IADLs Independent with driving;Independent with meal prep;Independent with medication management   Falls in the last 6 months 1 to 4   General   Additional Pertinent History Pt is a 64 year-old male who was admitted to the hospital on 10/8/24 due to fall, R knee pain. Imaging negative for fracture.   Family/Caregiver Present No   Cognition   Overall Cognitive Status WFL   Arousal/Participation Cooperative   Orientation Level Oriented X4   Following Commands Follows all commands and directions without difficulty   RLE Assessment   RLE Assessment   (4 to 4+/5)   LLE Assessment   LLE Assessment   (4 to 4+/5)   Bed Mobility   Supine to Sit 7  Independent   Sit to Supine 7  Independent   Transfers   Sit to Stand 5  Supervision   Additional items Assist x 1;Verbal cues  (Initially unsteady with posterior LOB, improved as session progressed)   Stand to Sit  5  Supervision   Additional items Assist x 1;Verbal cues   Ambulation/Elevation   Gait pattern Foward flexed;Short stride;Step through pattern   Gait Assistance 5  Supervision   Additional items Verbal cues   Assistive Device Rolling walker   Distance 40 feet   Stair Management Assistance 4  Minimal assist   Additional items Assist x 1;Verbal cues   Stair Management Technique One rail L;Step to pattern  (1 step up/down on step stool)   Balance   Static Sitting Normal   Dynamic Sitting Good   Static Standing Fair +   Dynamic Standing Fair   Ambulatory Fair -  (RW)   Activity Tolerance   Activity Tolerance Patient tolerated treatment well;Patient limited by pain   Nurse Made Aware yes RN Ada   Assessment   Prognosis Good   Problem List Decreased strength;Decreased endurance;Impaired balance;Decreased mobility;Decreased safety awareness;Pain;Decreased skin integrity   Assessment Patient seen for Physical Therapy evaluation. Patient admitted with Toxic metabolic encephalopathy.  Comorbidities affecting patient's physical performance include: anemia, cardiac disease, diabetes, ESRD on hemodialysis, falls, hypertension, neuropathy, PVD, and TIA.  Personal factors affecting patient at time of initial evaluation include: lives in 1 story house, ambulating with assistive device, stairs to enter home, inability to navigate level surfaces without external assistance, limited home support, positive fall history, and inability to perform IADLS . Prior to admission, patient was independent with functional mobility without assistive device, independent with ADLS, independent with IADLS, living alone in 1 story home with 8 steps to enter, ambulating household distance, and ambulating community distances.  Please find objective findings from Physical Therapy assessment regarding body systems outlined above with impairments and limitations including impaired balance, decreased endurance, gait deviations, pain, decreased activity  tolerance, decreased functional mobility tolerance, and fall risk.  The Barthel Index was used as a functional outcome tool presenting with a score of Barthel Index Score: 80 today indicating minimal limitations of functional mobility and ADLS.  Patient's clinical presentation is currently unstable/unpredictable as seen in patient's presentation of changing level of pain, increased fall risk, new onset of impairment of functional mobility, and decreased endurance. Pt would benefit from continued Physical Therapy treatment to address deficits as defined above and maximize level of functional mobility. As demonstrated by objective findings, the assigned level of complexity for this evaluation is high.The patient's [facility] Basic Mobility Inpatient Short Form Raw Score is 20. A Raw score of greater than 16 suggests the patient may benefit from discharge to home. Please also refer to the recommendation of the Physical Therapist for safe discharge planning.   Goals   Patient Goals to feel better   STG Expiration Date 10/18/24   Short Term Goal #1 Pt will perform bed mobility/transfers IND ; Standing balance will improve to fair+ to decrease risk of falls ; Pt will ambulate x 150 feet Mod i with RW ; Pt will negotiate x 8 steps with supervision to enter/exit home ; AMPAC score will improve >22/24 to demonstrate improved functional independence   LTG Expiration Date 10/25/24   Long Term Goal #1 Standing balance will improve to good to decrease risk of falls ; Pt will ambulate x 250 feet Mod i with least restrictive device ; Pt will negotiate x 8 steps IND to enter/exit home   Plan   Treatment/Interventions ADL retraining;Functional transfer training;LE strengthening/ROM;Therapeutic exercise;Endurance training;Gait training;Spoke to nursing   PT Frequency 3-5x/wk   Discharge Recommendation   Rehab Resource Intensity Level, PT III (Minimum Resource Intensity)  (Home vs. str pending progress with stairs)   [facility] Basic Mobility  Inpatient   Turning in Flat Bed Without Bedrails 4   Lying on Back to Sitting on Edge of Flat Bed Without Bedrails 4   Moving Bed to Chair 3   Standing Up From Chair Using Arms 3   Walk in Room 3   Climb 3-5 Stairs With Railing 3   Basic Mobility Inpatient Raw Score 20   Basic Mobility Standardized Score 43.99   [facility] Highest Level Of Mobility   -HLM Goal 6: Walk 10 steps or more   [facility]-HLM Achieved 7: Walk 25 feet or more   Barthel Index   Feeding 10   Bathing 5   Grooming Score 5   Dressing Score 10   Bladder Score 10   Bowels Score 10   Toilet Use Score 5   Transfers (Bed/Chair) Score 10   Mobility (Level Surface) Score 10   Stairs Score 5   Barthel Index Score 80   Additional Treatment Session   Start Time 1020   End Time 1030   Treatment Assessment S: Pt agreeable to PT session this AM O/A: Pt able to ambulate additional 40 feet with supervision using RW + intermittent verbal cues for improved posture/use of RW to decrease R knee pain with mobility. P: pt will cont to benefit from skilled PT to address deficits to maximize IND for safe d/c   Equipment Use walker   End of Consult   Patient Position at End of Consult Seated edge of bed;All needs within reach   Licensure   [place] License Number  Lizeth Conwayans [id number]

## 2024-10-11 NOTE — CONSULTS
H&P Exam - Urology       Patient: Naresh Carpio   : 1960 Sex: male   MRN: [id number]     CSN: [id number]      History of Present Illness   HPI:  Naresh Carpio is a 64 y.o. male who presents with brought in to [facility] a few days ago with encephalopathy rule out stroke Arguelles catheter inserted noting gross painless hematuria Arguelles catheter removed patient more alert and awake noting some blood and clots post Arguelles removal day #1 on Lovenox therapy chronic renal failure on hemodialysis  and Saturday fair patient makes no more than 1 ounce of urine a day        Review of Systems:   Constitutional:  Negative for activity change, fever, chills and diaphoresis.   HENT: Negative for hearing loss and trouble swallowing.   Eyes: Negative for itching and visual disturbance.   Respiratory: Negative for chest tightness and shortness of breath.   Cardiovascular: Negative for chest pain, edema.   Gastrointestinal: Negative for abdominal distention, na abdominal pain, constipation, diarrhea, Nausea and vomiting.   Genitourinary: Negative for decreased urine volume, difficulty urinating, dysuria, enuresis, frequency, hematuria and urgency.   Musculoskeletal: Negative for gait problem and myalgias.   Neurological: Negative for dizziness and headaches.   Hematological: Does not bruise/bleed easily.       Historical Information   Past Medical History:   Diagnosis Date    CKD     Diabetes mellitus (HCC)     Hyperlipidemia     Hypertension     Left toe amputee (HCC)     TIA (transient ischemic attack)      Past Surgical History:   Procedure Laterality Date    AMPUTATION Left     left foot resection 10 years ago dr tran    IR LOWER EXTREMITY ANGIOGRAM  2023    IR TEMPORARY DIALYSIS CATHETER PLACEMENT  2023    IR TUNNELED CENTRAL LINE REMOVAL  2023    IR TUNNELED DIALYSIS CATHETER PLACEMENT  2022    IR TUNNELED DIALYSIS CATHETER PLACEMENT  2023    AK AMPUTATION METATARSAL  "W/TOE SINGLE Right 8/31/2023    Procedure: RIGHT PARTIAL 1ST RAY RESECTION WITH  WOUND VAC APPLICATION;  Surgeon: Won Parmar DPM;  Location: [facility];  Service: Podiatry     Social History   Social History     Substance and Sexual Activity   Alcohol Use Not Currently    Alcohol/week: 0.0 standard drinks of alcohol    Comment: 0     Social History     Substance and Sexual Activity   Drug Use Never     Social History     Tobacco Use   Smoking Status Never    Passive exposure: Never   Smokeless Tobacco Never     Family History: History reviewed. No pertinent family history.    Meds/Allergies     Medications Prior to Admission:     acetaminophen (TYLENOL) 325 mg tablet    allopurinol (ZYLOPRIM) 100 mg tablet    mupirocin (BACTROBAN) 2 % ointment    NIFEdipine (PROCARDIA XL) 30 mg 24 hr tablet    atorvastatin (LIPITOR) 80 mg tablet    docusate sodium (COLACE) 100 mg capsule    enoxaparin (LOVENOX) 30 mg/0.3 mL    gabapentin (NEURONTIN) 100 mg capsule    naloxone (NARCAN) 4 mg/0.1 mL nasal spray    polyethylene glycol (MIRALAX) 17 g packet    sevelamer (RENAGEL) 800 mg tablet  No Known Allergies    Objective   Vitals: /50   Pulse 73   Temp 97.8 °F (36.6 °C)   Resp 16   Ht 6' 1\" (1.854 m)   Wt 104 kg (229 lb 4.5 oz)   SpO2 93%   BMI 30.25 kg/m²     Physical Exam:  General Alert awake   Normocephalic atraumatic PERRLA  Lungs clear bilaterally  Cardiac normal S1 normal S2  Abdomen soft, flank pain  Extremities no edema    I/O last 24 hours:  In: 1060 [I.V.:510; Blood:350; Other:100; IV Piggyback:100]  Out: 4375 [Urine:775; Other:3600]    Invasive Devices       Peripheral Intravenous Line  Duration             Peripheral IV 10/08/24 Dorsal (posterior);Left Forearm 3 days    Peripheral IV 10/08/24 Left;Proximal;Ventral (anterior) Forearm 3 days              Hemodialysis Catheter  Duration             HD Permanent Double Catheter -- days                        Lab Results: CBC:   Lab Results   Component Value " "Date    WBC 6.51 10/11/2024    HGB 7.9 (L) 10/11/2024    HCT 24.1 (L) 10/11/2024    MCV 90 10/11/2024     10/11/2024    RBC 2.68 (L) 10/11/2024    MCH 29.5 10/11/2024    MCHC 32.8 10/11/2024    RDW 17.2 (H) 10/11/2024    MPV 10.2 10/11/2024    NRBC 0 10/11/2024     CMP:   Lab Results   Component Value Date    CL 94 (L) 10/11/2024    CO2 26 10/11/2024    BUN 30 (H) 10/11/2024    CREATININE 5.25 (H) 10/11/2024    CALCIUM 8.4 10/11/2024    AST 25 10/08/2024    ALT 39 10/08/2024    ALKPHOS 100 10/08/2024    EGFR 10 10/11/2024     Urinalysis:   Lab Results   Component Value Date    COLORU Yellow 08/24/2023    CLARITYU Clear 08/24/2023    SPECGRAV 1.010 08/24/2023    PHUR 7.0 08/24/2023    LEUKOCYTESUR Negative 08/24/2023    NITRITE Negative 08/24/2023    GLUCOSEU 100 (1/10%) (A) 08/24/2023    KETONESU Negative 08/24/2023    BILIRUBINUR Negative 08/24/2023    BLOODU Small (A) 08/24/2023     Urine Culture: No results found for: \"URINECX\"  PSA: No results found for: \"PSA\"        Assessment/ Plan:  Gross hematuria status post Arguelles catheter removal possible Arguelles trauma persistent hematuria after Arguelles removed could be from old clots mixing with lower urine output in light of renal failure and on dialysis would see in the office in a few weeks if hematuria still present would need to consider cystoscopy retrograde pyelograms as an outpatient      Pete Rivera MD    "

## 2024-10-11 NOTE — ASSESSMENT & PLAN NOTE
Patient initially had gaitan placed for urinary retention, requested it be removed  Noted to have hematuria after gaitan removal with clots  Urinary retention protocol  Aspirin and DVT prophylaxis currently on held  Urology consulted

## 2024-10-11 NOTE — ASSESSMENT & PLAN NOTE
CT head, CT c-spine, and XR R knee negative for acute abnormality on admission  PT/OT  Fall precautions

## 2024-10-12 ENCOUNTER — APPOINTMENT (INPATIENT)
Dept: DIALYSIS | Facility: HOSPITAL | Age: 64
DRG: 640 | End: 2024-10-12
Attending: FAMILY MEDICINE
Payer: MEDICARE

## 2024-10-12 LAB
ANION GAP SERPL CALCULATED.3IONS-SCNC: 11 MMOL/L (ref 4–13)
BUN SERPL-MCNC: 40 MG/DL (ref 5–25)
CALCIUM SERPL-MCNC: 7.7 MG/DL (ref 8.4–10.2)
CHLORIDE SERPL-SCNC: 94 MMOL/L (ref 96–108)
CO2 SERPL-SCNC: 24 MMOL/L (ref 21–32)
CREAT SERPL-MCNC: 6.92 MG/DL (ref 0.6–1.3)
ERYTHROCYTE [DISTWIDTH] IN BLOOD BY AUTOMATED COUNT: 16.9 % (ref 11.6–15.1)
GFR SERPL CREATININE-BSD FRML MDRD: 7 ML/MIN/1.73SQ M
GLUCOSE SERPL-MCNC: 106 MG/DL (ref 65–140)
GLUCOSE SERPL-MCNC: 121 MG/DL (ref 65–140)
GLUCOSE SERPL-MCNC: 124 MG/DL (ref 65–140)
GLUCOSE SERPL-MCNC: 91 MG/DL (ref 65–140)
GLUCOSE SERPL-MCNC: 98 MG/DL (ref 65–140)
HCT VFR BLD AUTO: 22.9 % (ref 36.5–49.3)
HCT VFR BLD AUTO: 24.6 % (ref 36.5–49.3)
HGB BLD-MCNC: 7.3 G/DL (ref 12–17)
HGB BLD-MCNC: 7.9 G/DL (ref 12–17)
MCH RBC QN AUTO: 29.2 PG (ref 26.8–34.3)
MCHC RBC AUTO-ENTMCNC: 31.9 G/DL (ref 31.4–37.4)
MCV RBC AUTO: 92 FL (ref 82–98)
PLATELET # BLD AUTO: 201 THOUSANDS/UL (ref 149–390)
PMV BLD AUTO: 9.8 FL (ref 8.9–12.7)
POTASSIUM SERPL-SCNC: 4.8 MMOL/L (ref 3.5–5.3)
RBC # BLD AUTO: 2.5 MILLION/UL (ref 3.88–5.62)
SODIUM SERPL-SCNC: 129 MMOL/L (ref 135–147)
WBC # BLD AUTO: 6.51 THOUSAND/UL (ref 4.31–10.16)

## 2024-10-12 PROCEDURE — 99232 SBSQ HOSP IP/OBS MODERATE 35: CPT

## 2024-10-12 PROCEDURE — 82948 REAGENT STRIP/BLOOD GLUCOSE: CPT

## 2024-10-12 PROCEDURE — 85014 HEMATOCRIT: CPT

## 2024-10-12 PROCEDURE — 85018 HEMOGLOBIN: CPT

## 2024-10-12 PROCEDURE — 90935 HEMODIALYSIS ONE EVALUATION: CPT | Performed by: INTERNAL MEDICINE

## 2024-10-12 PROCEDURE — 80048 BASIC METABOLIC PNL TOTAL CA: CPT

## 2024-10-12 PROCEDURE — 85027 COMPLETE CBC AUTOMATED: CPT

## 2024-10-12 RX ORDER — GABAPENTIN 300 MG/1
300 CAPSULE ORAL 2 TIMES DAILY
Status: DISCONTINUED | OUTPATIENT
Start: 2024-10-13 | End: 2024-10-22 | Stop reason: HOSPADM

## 2024-10-12 RX ORDER — ONDANSETRON 2 MG/ML
4 INJECTION INTRAMUSCULAR; INTRAVENOUS EVERY 6 HOURS PRN
Status: DISCONTINUED | OUTPATIENT
Start: 2024-10-12 | End: 2024-10-22 | Stop reason: HOSPADM

## 2024-10-12 RX ORDER — GABAPENTIN 100 MG/1
100 CAPSULE ORAL ONCE
Status: COMPLETED | OUTPATIENT
Start: 2024-10-12 | End: 2024-10-12

## 2024-10-12 RX ADMIN — ALLOPURINOL 100 MG: 100 TABLET ORAL at 12:09

## 2024-10-12 RX ADMIN — ESCITALOPRAM OXALATE 10 MG: 10 TABLET ORAL at 12:10

## 2024-10-12 RX ADMIN — NIFEDIPINE 30 MG: 30 TABLET, EXTENDED RELEASE ORAL at 17:14

## 2024-10-12 RX ADMIN — GABAPENTIN 200 MG: 100 CAPSULE ORAL at 08:18

## 2024-10-12 RX ADMIN — ATORVASTATIN CALCIUM 40 MG: 40 TABLET, FILM COATED ORAL at 17:14

## 2024-10-12 RX ADMIN — GABAPENTIN 200 MG: 100 CAPSULE ORAL at 17:14

## 2024-10-12 RX ADMIN — ONDANSETRON 4 MG: 2 INJECTION INTRAMUSCULAR; INTRAVENOUS at 21:28

## 2024-10-12 RX ADMIN — DOCUSATE SODIUM 100 MG: 100 CAPSULE, LIQUID FILLED ORAL at 12:09

## 2024-10-12 RX ADMIN — GABAPENTIN 100 MG: 100 CAPSULE ORAL at 12:09

## 2024-10-12 RX ADMIN — ACETAMINOPHEN 1000 MG: 10 INJECTION INTRAVENOUS at 09:52

## 2024-10-12 RX ADMIN — PANTOPRAZOLE SODIUM 40 MG: 40 INJECTION, POWDER, FOR SOLUTION INTRAVENOUS at 12:09

## 2024-10-12 RX ADMIN — DOCUSATE SODIUM 100 MG: 100 CAPSULE, LIQUID FILLED ORAL at 17:14

## 2024-10-12 RX ADMIN — HYOSCYAMINE SULFATE 1 APPLICATION: 16 SOLUTION at 12:15

## 2024-10-12 RX ADMIN — PANTOPRAZOLE SODIUM 40 MG: 40 INJECTION, POWDER, FOR SOLUTION INTRAVENOUS at 21:28

## 2024-10-12 NOTE — PROGRESS NOTES
Progress Note - Hospitalist   Name: Naresh Carpio 64 y.o. male I MRN: [id number]  Unit/Bed#: [address] [address]01 I Date of Admission: 10/8/2024   Date of Service: 10/12/2024 I Hospital Day: 3    Assessment & Plan  Toxic metabolic encephalopathy  RR called 10/8 for acute change in mental status with last well known 12:58, stroke alert called 1:16 pm  NIH 8 and decision was made to give TNK after initial CT head negative for bleed  CTA head/neck showed intracranial ICA atherosclerosis - outpatient vascular surgery referral  MRI brain without evidence of acute CVA  Suspect metabolic encephalopathy in setting of ESRD with missed dialysis  Patient back at baseline mentation  Continue to monitor  ESRD (end stage renal disease) ([facility])  Lab Results   Component Value Date    EGFR 7 10/12/2024    EGFR 10 10/11/2024    EGFR 6 10/10/2024    CREATININE 6.92 (H) 10/12/2024    CREATININE 5.25 (H) 10/11/2024    CREATININE 7.55 (H) 10/10/2024     Patient gets dialysis Tuesday, Thursday, Saturday and has missed dialysis for about a week since being discharged from short-term rehab.  Patient was scheduled for dialysis at a different facility but family stated that they cannot transport him for his appointments  Nephrology following  Continued on TTS schedule  Continue Renagel for hyperphosphatemia  Hematuria  Patient initially had gaitan placed for urinary retention, requested it be removed  Noted to have hematuria after gaitan removal with clots  Urinary retention protocol  Aspirin and DVT prophylaxis currently on held  Urology consulted  Patient notes clear urine today  If ongoing hematuria, would need outpatient follow up for possible cystoscopy  Acute on chronic anemia  Hemoglobin 10.2 on admission, noted to drop to 6.7 after TNK administration  Patient initially declined CT a/p and blood transfusion, eventually agreeable to 1 unit PRBC on 10/10  Urology following for hematuria, urine clear today  Denies black/bloody bowel  movements  Aspirin and DVT prophylaxis currently on hold  Hemoglobin 7.3 this morning  Patient would like to repeat hemoglobin later this afternoon before agreeing to CT scan to evaluate for source of bleeding  Follow up repeat h&h  Type 2 diabetes mellitus, without long-term current use of insulin ([facility])  Lab Results   Component Value Date    HGBA1C 5.5 10/08/2024       Recent Labs     10/11/24  1602 10/11/24  2035 10/12/24  0724 10/12/24  1120   POCGLU 106 118 98 91   SSI  Diabetic diet  Status post fall  CT head, CT c-spine, and XR R knee negative for acute abnormality on admission  PT/OT  Fall precautions  Hyperkalemia  In setting of ESRD and missed dialysis  Currently improved    VTE Pharmacologic Prophylaxis: VTE Score: 2 Low Risk (Score 0-2) - Encourage Ambulation.    Mobility:   Basic Mobility Inpatient Raw Score: 20  JH-HLM Goal: 6: Walk 10 steps or more  JH-HLM Achieved: 4: Move to chair/commode  JH-HLM Goal NOT achieved. Continue with multidisciplinary rounding and encourage appropriate mobility to improve upon JH-HLM goals.    Patient Centered Rounds: I performed bedside rounds with nursing staff today.   Discussions with Specialists or Other Care Team Provider: nephrology, rn, cm    Education and Discussions with Family / Patient: Patient declined call to .     Current Length of Stay: 3 day(s)  Current Patient Status: Inpatient   Certification Statement: The patient will continue to require additional inpatient hospital stay due to anemia, hematuria  Discharge Plan: Anticipate discharge in 48-72 hrs to discharge location to be determined pending rehab evaluations.    Code Status: Level 1 - Full Code    Subjective   Patient seen and examined at bedside this morning during dialysis. Reports lower extremity neuropathy is bothering him. Discussed hemoglobin dropping to 7.3. He would still like to hold off further imaging for now to look for source off bleeding. He is agreeable to repeat  hemoglobin later today to monitor trend. Reports brown bowel movement yesterday. Reports urine is clear today. Denies chest pain or shortness of breath.    Objective :  Temp:  [97.8 °F (36.6 °C)-98.4 °F (36.9 °C)] 98 °F (36.7 °C)  HR:  [66-79] 74  BP: (133-182)/(50-80) 182/66  Resp:  [14-17] 16  SpO2:  [93 %-95 %] 95 %  O2 Device: None (Room air)    Body mass index is 29.28 kg/m².     Input and Output Summary (last 24 hours):     Intake/Output Summary (Last 24 hours) at 10/12/2024 1141  Last data filed at 10/12/2024 1100  Gross per 24 hour   Intake 800 ml   Output 500 ml   Net 300 ml       Physical Exam  Vitals and nursing note reviewed.   Constitutional:       General: He is not in acute distress.     Appearance: He is well-developed.   Cardiovascular:      Rate and Rhythm: Normal rate and regular rhythm.   Pulmonary:      Effort: Pulmonary effort is normal. No respiratory distress.      Breath sounds: Normal breath sounds.   Abdominal:      Palpations: Abdomen is soft.      Tenderness: There is no abdominal tenderness.   Musculoskeletal:         General: No swelling.   Skin:     General: Skin is warm and dry.      Capillary Refill: Capillary refill takes less than 2 seconds.   Neurological:      General: No focal deficit present.      Mental Status: He is alert and oriented to person, place, and time.   Psychiatric:         Mood and Affect: Mood normal.           Lines/Drains:  Lines/Drains/Airways       Active Status       Name Placement date Placement time Site Days    HD Permanent Double Catheter --  --  Internal jugular  --                            Lab Results: I have reviewed the following results:   Results from last 7 days   Lab Units 10/12/24  0435 10/11/24  0510   WBC Thousand/uL 6.51 6.51   HEMOGLOBIN g/dL 7.3* 7.9*   HEMATOCRIT % 22.9* 24.1*   PLATELETS Thousands/uL 201 207   SEGS PCT %  --  74   LYMPHO PCT %  --  12*   MONO PCT %  --  10   EOS PCT %  --  4     Results from last 7 days   Lab Units  10/12/24  0435 10/08/24  1407 10/08/24  0454   SODIUM mmol/L 129*   < > 138   POTASSIUM mmol/L 4.8   < > 5.8*   CHLORIDE mmol/L 94*   < > 106   CO2 mmol/L 24   < > 16*   BUN mg/dL 40*   < > 94*   CREATININE mg/dL 6.92*   < > 11.08*   ANION GAP mmol/L 11   < > 16*   CALCIUM mg/dL 7.7*   < > 8.3*   ALBUMIN g/dL  --   --  4.2   TOTAL BILIRUBIN mg/dL  --   --  0.52   ALK PHOS U/L  --   --  100   ALT U/L  --   --  39   AST U/L  --   --  25   GLUCOSE RANDOM mg/dL 121   < > 139    < > = values in this interval not displayed.         Results from last 7 days   Lab Units 10/12/24  1120 10/12/24  0724 10/11/24  2035 10/11/24  1602 10/11/24  1048 10/11/24  0719 10/10/24  2018 10/10/24  1535 10/10/24  1138 10/10/24  0611 10/09/24  2003 10/09/24  1604   POC GLUCOSE mg/dl 91 98 118 106 115 100 119 104 104 85 123 114     Results from last 7 days   Lab Units 10/08/24  0839   HEMOGLOBIN A1C % 5.5           Recent Cultures (last 7 days):         Imaging Results Review: No pertinent imaging studies reviewed.  Other Study Results Review: No additional pertinent studies reviewed.    Last 24 Hours Medication List:     Current Facility-Administered Medications:     acetaminophen (Ofirmev) injection 1,000 mg, Q6H PRN, Last Rate: Stopped (10/12/24 1010)    allopurinol (ZYLOPRIM) tablet 100 mg, Daily    atorvastatin (LIPITOR) tablet 40 mg, Daily With Dinner    docusate sodium (COLACE) capsule 100 mg, BID    escitalopram (LEXAPRO) tablet 10 mg, Daily    gabapentin (NEURONTIN) capsule 100 mg, Once    gabapentin (NEURONTIN) capsule 200 mg, BID    hydrALAZINE (APRESOLINE) injection 10 mg, Q6H PRN    influenza vaccine, high-dose (Fluzone High-Dose) IM injection 0.5 mL, Once PRN    insulin lispro (HumALOG/ADMELOG) 100 units/mL subcutaneous injection 1-6 Units, TID AC **AND** Fingerstick Glucose (POCT), TID AC    labetalol (NORMODYNE) injection 10 mg, Q4H PRN    NIFEdipine (PROCARDIA XL) 24 hr tablet 30 mg, After Dinner    pantoprazole (PROTONIX)  injection 40 mg, Q12H KEMAL    polyethylene glycol (MIRALAX) packet 17 g, Daily PRN    sodium hypochlorite (DAKIN'S HALF-STRENGTH) 0.25 percent topical solution 1 Application, Daily    Administrative Statements   Today, Patient Was Seen By: Nallely Pisano PA-C    **Please Note: This note may have been constructed using a voice recognition system.**

## 2024-10-12 NOTE — PROGRESS NOTES
NEPHROLOGY HOSPITAL PROGRESS NOTE   Naresh Carpio 64 y.o. male MRN: [id number]  Unit/Bed#: [address] [place] [address] Encounter: [id number]  Reason for Consult: ESRD on HD    ASSESSMENT and PLAN:  Naresh Carpio is a 64 y.o. male who was admitted to [facility] after presenting with fall. A renal consultation is requested for assistance in the management of ESRD on HD.     1.  ESRD on HD.  TTS at [facility].  Missed 1 week of dialysis prior to admission.  Dialysis today per schedule.    HEMODIALYSIS PROCEDURE NOTE  The patient was seen and examined on hemodialysis.  Time: 4 hours  Sodium: 138 Blood flow: 400   Dialyzer: F160 Potassium: 2 Dialysate flow: 600   Access: R TDC Bicarbonate: 35 Ultrafiltration goal: 2 L   Medications on HD: None        2.  Access.  Right IJ permacath.     3.  Hyperkalemia.  Resolved with HD.  2K bath on HD today.    4.  Anemia in CKD.  Hemoglobin 10.2 on admission.  Hemoglobin dropped to 6.9 after TNK.  Unclear etiology of drop in hemoglobin status post TNK.  Hemoglobin today 7.3 improved after 1 unit PRBC on 10/10.  Avoiding Epogen due to recent episode of strokelike symptoms.     5.  Anion gap metabolic acidosis.  Resolved with HD.  Continue 35 mEq bicarb bath on HD.     6.  Hypertension.  Home Rx: Nifedipine 30 mg daily.  Current Rx: None.  Neurology okay with normotension.  Continue nifedipine at home dose.     7.  Hyperphosphatemia.  Continue Renagel 1 tablet 3 times daily with meals.    8.  Strokelike symptoms.  Status post TNK on 10/08.  Status post CTA head/neck with intracranial ICA atherosclerosis resulting in narrowing most prominent involving proximal right supraclinoid segment, no large vessel occlusion.  MRI brain with no acute ischemia.  Management per neurology.    9.  Hyponatremia.  This is due to lack of free water clearance in setting of ESRD.  Sodium level 129.  Continue fluid restriction 1200 cc per 24 hours.      SUBJECTIVE / 24H INTERVAL HISTORY:  Complains  of bilateral leg pain.  Gabapentin is not helping.  Denies dyspnea.  He was seen and examined on hemodialysis this morning around 9:30 AM.    OBJECTIVE:  Current Weight: Weight - Scale: 101 kg (221 lb 14.4 oz)  Vitals:    10/12/24 0600 10/12/24 0750 10/12/24 0800 10/12/24 0830   BP:  161/65 160/66 (!) 178/70   BP Location:  Right arm     Pulse:  69 73 71   Resp:  16 16 16   Temp:  98 °F (36.7 °C)     TempSrc:  Oral     SpO2:    95%   Weight: 101 kg (221 lb 14.4 oz)      Height:           Intake/Output Summary (Last 24 hours) at 10/12/2024 0851  Last data filed at 10/12/2024 0750  Gross per 24 hour   Intake 400 ml   Output 350 ml   Net 50 ml     Review of Systems   Constitutional:  Negative for chills and fever.   HENT:  Negative for ear pain and sore throat.    Eyes:  Negative for pain and visual disturbance.   Respiratory:  Negative for cough and shortness of breath.    Cardiovascular:  Negative for chest pain and palpitations.   Gastrointestinal:  Negative for abdominal pain and vomiting.   Genitourinary:  Negative for dysuria and hematuria.   Musculoskeletal:  Negative for arthralgias and back pain.        Leg pain   Skin:  Negative for color change and rash.   Neurological:  Negative for seizures and syncope.   All other systems reviewed and are negative.    Physical Exam  Vitals and nursing note reviewed.   Constitutional:       General: He is not in acute distress.     Appearance: He is well-developed.   HENT:      Head: Normocephalic and atraumatic.   Eyes:      Conjunctiva/sclera: Conjunctivae normal.   Cardiovascular:      Rate and Rhythm: Normal rate and regular rhythm.      Heart sounds: No murmur heard.     Comments: Right chest wall permacath currently in use for hemodialysis  Pulmonary:      Effort: Pulmonary effort is normal. No respiratory distress.      Breath sounds: Normal breath sounds.   Abdominal:      Palpations: Abdomen is soft.      Tenderness: There is no abdominal tenderness.    Musculoskeletal:         General: No swelling.      Cervical back: Neck supple.      Right lower leg: No edema.      Left lower leg: No edema.   Skin:     General: Skin is warm and dry.      Capillary Refill: Capillary refill takes less than 2 seconds.   Neurological:      Mental Status: He is alert.   Psychiatric:         Mood and Affect: Mood normal.       Medications:    Current Facility-Administered Medications:     acetaminophen (Ofirmev) injection 1,000 mg, 1,000 mg, Intravenous, Q6H PRN, Lore Revankar, DO, Last Rate: 400 mL/hr at 10/10/24 1951, 1,000 mg at 10/10/24 1951    allopurinol (ZYLOPRIM) tablet 100 mg, 100 mg, Oral, Daily, Lore Revankar, DO, 100 mg at 10/11/24 0849    atorvastatin (LIPITOR) tablet 40 mg, 40 mg, Oral, Daily With Dinner, Lore Revankar, DO, 40 mg at 10/11/24 1628    docusate sodium (COLACE) capsule 100 mg, 100 mg, Oral, BID, Lore Revankar, DO, 100 mg at 10/11/24 1733    escitalopram (LEXAPRO) tablet 10 mg, 10 mg, Oral, Daily, Lore Revankar, DO, 10 mg at 10/11/24 0849    gabapentin (NEURONTIN) capsule 200 mg, 200 mg, Oral, BID, German Linton MD, 200 mg at 10/12/24 0818    hydrALAZINE (APRESOLINE) injection 10 mg, 10 mg, Intravenous, Q6H PRN, Lore Revankar, DO, 10 mg at 10/10/24 1944    influenza vaccine, high-dose (Fluzone High-Dose) IM injection 0.5 mL, 0.5 mL, Intramuscular, Once PRN, Lore Revankar, DO    insulin lispro (HumALOG/ADMELOG) 100 units/mL subcutaneous injection 1-6 Units, 1-6 Units, Subcutaneous, TID AC **AND** Fingerstick Glucose (POCT), , , TID AC, Lore Revankar, DO    labetalol (NORMODYNE) injection 10 mg, 10 mg, Intravenous, Q4H PRN, Lore Revankar, DO, 10 mg at 10/10/24 1822    NIFEdipine (PROCARDIA XL) 24 hr tablet 30 mg, 30 mg, Oral, After Dinner, Lore Silver DO, 30 mg at 10/11/24 1733    pantoprazole (PROTONIX) injection 40 mg, 40 mg, Intravenous, Q12H KEMAL, Lore Silver DO, 40 mg at 10/11/24 2025    polyethylene glycol (MIRALAX) packet  "17 g, 17 g, Oral, Daily PRN, Lore Silver DO, 17 g at 10/10/24 1841    sodium hypochlorite (DAKIN'S HALF-STRENGTH) 0.25 percent topical solution 1 Application, 1 Application, Irrigation, Daily, Lore Silver DO, 1 Application at 10/11/24 0901    Laboratory Results:  Results from last 7 days   Lab Units 10/12/24  0435 10/11/24  0510 10/10/24  1638 10/10/24  0610 10/09/24  2004 10/09/24  1620 10/09/24  1544 10/08/24  1407 10/08/24  0454 10/08/24  0216   WBC Thousand/uL 6.51 6.51  --  6.49  --  5.67 5.74  --  9.63 7.36   HEMOGLOBIN g/dL 7.3* 7.9* 8.1* 6.9* 6.9* 6.7* 6.9*  --  10.2* 9.8*   HEMATOCRIT % 22.9* 24.1*  --  21.6* 22.0* 21.0* 21.4*  --  32.3* 31.6*   PLATELETS Thousands/uL 201 207  --  190  --  158 169  --  206 192   POTASSIUM mmol/L 4.8 4.6  --  5.2  --   --  4.7 4.1 5.8* 5.8*   CHLORIDE mmol/L 94* 94*  --  99  --   --  98 98 106 105   CO2 mmol/L 24 26  --  22  --   --  23 22 16* 19*   BUN mg/dL 40* 30*  --  49*  --   --  45* 35* 94* 95*   CREATININE mg/dL 6.92* 5.25*  --  7.55*  --   --  7.12* 5.20* 11.08* 11.13*   CALCIUM mg/dL 7.7* 8.4  --  7.5*  --   --  7.5* 8.0* 8.3* 8.1*   MAGNESIUM mg/dL  --  2.0  --  2.1  --   --   --  1.9  --  2.3   PHOSPHORUS mg/dL  --  5.4*  --   --   --   --   --   --   --  7.7*       Portions of the record may have been created with voice recognition software. Occasional wrong word or \"sound a like\" substitutions may have occurred due to the inherent limitations of voice recognition software. Read the chart carefully and recognize, using context, where substitutions have occurred. If you have any questions, please contact the dictating provider.    "

## 2024-10-12 NOTE — ASSESSMENT & PLAN NOTE
Patient initially had gaitan placed for urinary retention, requested it be removed  Noted to have hematuria after gaitan removal with clots  Urinary retention protocol  Aspirin and DVT prophylaxis currently on held  Urology consulted  Patient notes clear urine today  If ongoing hematuria, would need outpatient follow up for possible cystoscopy

## 2024-10-12 NOTE — ASSESSMENT & PLAN NOTE
Lab Results   Component Value Date    EGFR 7 10/12/2024    EGFR 10 10/11/2024    EGFR 6 10/10/2024    CREATININE 6.92 (H) 10/12/2024    CREATININE 5.25 (H) 10/11/2024    CREATININE 7.55 (H) 10/10/2024     Patient gets dialysis Tuesday, Thursday, Saturday and has missed dialysis for about a week since being discharged from short-term rehab.  Patient was scheduled for dialysis at a different facility but family stated that they cannot transport him for his appointments  Nephrology following  Continued on TTS schedule  Continue Renagel for hyperphosphatemia

## 2024-10-12 NOTE — PLAN OF CARE
Problem: Potential for Falls  Goal: Patient will remain free of falls  Description: INTERVENTIONS:  - Educate patient/family on patient safety including physical limitations  - Instruct patient to call for assistance with activity   - Consult OT/PT to assist with strengthening/mobility   - Keep Call bell within reach  - Keep bed low and locked with side rails adjusted as appropriate  - Keep care items and personal belongings within reach  - Initiate and maintain comfort rounds  - Make Fall Risk Sign visible to staff  - Offer Toileting every 2 Hours, in advance of need  - Initiate/Maintain bed alarm  - Obtain necessary fall risk management equipment: fall risk sign on door  - Apply yellow socks and bracelet for high fall risk patients  - Consider moving patient to room near nurses station  Outcome: Progressing     Problem: METABOLIC, FLUID AND ELECTROLYTES - ADULT  Goal: Electrolytes maintained within normal limits  Description: INTERVENTIONS:  - Monitor labs and assess patient for signs and symptoms of electrolyte imbalances  - Administer electrolyte replacement as ordered  - Monitor response to electrolyte replacements, including repeat lab results as appropriate  - Instruct patient on fluid and nutrition as appropriate  Outcome: Progressing  Goal: Fluid balance maintained  Description: INTERVENTIONS:  - Monitor labs   - Monitor I/O and WT  - Instruct patient on fluid and nutrition as appropriate  - Assess for signs & symptoms of volume excess or deficit  Outcome: Progressing     Problem: Prexisting or High Potential for Compromised Skin Integrity  Goal: Skin integrity is maintained or improved  Description: INTERVENTIONS:  - Identify patients at risk for skin breakdown  - Assess and monitor skin integrity  - Assess and monitor nutrition and hydration status  - Monitor labs   - Assess for incontinence   - Turn and reposition patient  - Assist with mobility/ambulation  - Relieve pressure over bony prominences  -  Avoid friction and shearing  - Provide appropriate hygiene as needed including keeping skin clean and dry  - Evaluate need for skin moisturizer/barrier cream  - Collaborate with interdisciplinary team   - Patient/family teaching  - Consider wound care consult   Outcome: Progressing     Problem: PAIN - ADULT  Goal: Verbalizes/displays adequate comfort level or baseline comfort level  Description: Interventions:  - Encourage patient to monitor pain and request assistance  - Assess pain using appropriate pain scale  - Administer analgesics based on type and severity of pain and evaluate response  - Implement non-pharmacological measures as appropriate and evaluate response  - Consider cultural and social influences on pain and pain management  - Notify physician/advanced practitioner if interventions unsuccessful or patient reports new pain  Outcome: Progressing     Problem: INFECTION - ADULT  Goal: Absence or prevention of progression during hospitalization  Description: INTERVENTIONS:  - Assess and monitor for signs and symptoms of infection  - Monitor lab/diagnostic results  - Monitor all insertion sites, i.e. indwelling lines, tubes, and drains  - Administer medications as ordered  - Instruct and encourage patient and family to use good hand hygiene technique  - Identify and instruct in appropriate isolation precautions for identified infection/condition  Outcome: Progressing  Goal: Absence of fever/infection during neutropenic period  Description: INTERVENTIONS:  - Monitor WBC    Outcome: Progressing     Problem: SAFETY ADULT  Goal: Patient will remain free of falls  Description: INTERVENTIONS:  - Educate patient/family on patient safety including physical limitations  - Instruct patient to call for assistance with activity   - Consult OT/PT to assist with strengthening/mobility   - Keep Call bell within reach  - Keep bed low and locked with side rails adjusted as appropriate  - Keep care items and personal  belongings within reach  - Initiate and maintain comfort rounds  - Make Fall Risk Sign visible to staff  - Offer Toileting every 2 Hours, in advance of need  - Initiate/Maintain bed alarm  - Obtain necessary fall risk management equipment: fall risk sign on door  - Apply yellow socks and bracelet for high fall risk patients  - Consider moving patient to room near nurses station  Outcome: Progressing  Goal: Maintain or return to baseline ADL function  Description: INTERVENTIONS:  -  Assess patient's ability to carry out ADLs; assess patient's baseline for ADL function and identify physical deficits which impact ability to perform ADLs (bathing, care of mouth/teeth, toileting, grooming, dressing, etc.)  - Assess/evaluate cause of self-care deficits   - Assess range of motion  - Assess patient's mobility; develop plan if impaired  - Assess patient's need for assistive devices and provide as appropriate  - Encourage maximum independence but intervene and supervise when necessary  - Involve family in performance of ADLs  - Assess for home care needs following discharge   - Consider OT consult to assist with ADL evaluation and planning for discharge  - Provide patient education as appropriate  Outcome: Progressing  Goal: Maintains/Returns to pre admission functional level  Description: INTERVENTIONS:  - Perform AM-PAC 6 Click Basic Mobility/ Daily Activity assessment daily.  - Set and communicate daily mobility goal to care team and patient/family/caregiver.   - Collaborate with rehabilitation services on mobility goals if consulted  - Perform Range of Motion 2 times a day.  - Reposition patient every 2 hours.  - Dangle patient 2 times a day  - Stand patient 2 times a day  - Ambulate patient 3 times a day  - Out of bed to chair 3 times a day   - Out of bed for meals 3 times a day  - Out of bed for toileting  - Record patient progress and toleration of activity level   Outcome: Progressing     Problem: DISCHARGE  PLANNING  Goal: Discharge to home or other facility with appropriate resources  Description: INTERVENTIONS:  - Identify barriers to discharge w/patient and caregiver  - Arrange for needed discharge resources and transportation as appropriate  - Identify discharge learning needs (meds, wound care, etc.)  - Arrange for interpretive services to assist at discharge as needed  - Refer to Case Management Department for coordinating discharge planning if the patient needs post-hospital services based on physician/advanced practitioner order or complex needs related to functional status, cognitive ability, or social support system  Outcome: Progressing     Problem: Knowledge Deficit  Goal: Patient/family/caregiver demonstrates understanding of disease process, treatment plan, medications, and discharge instructions  Description: Complete learning assessment and assess knowledge base.  Interventions:  - Provide teaching at level of understanding  - Provide teaching via preferred learning methods  Outcome: Progressing     Problem: CARDIOVASCULAR - ADULT  Goal: Maintains optimal cardiac output and hemodynamic stability  Description: INTERVENTIONS:  - Monitor I/O, vital signs and rhythm  - Monitor for S/S and trends of decreased cardiac output  - Administer and titrate ordered vasoactive medications to optimize hemodynamic stability  - Assess quality of pulses, skin color and temperature  - Assess for signs of decreased coronary artery perfusion  - Instruct patient to report change in severity of symptoms  Outcome: Progressing  Goal: Absence of cardiac dysrhythmias or at baseline rhythm  Description: INTERVENTIONS:  - Continuous cardiac monitoring, vital signs, obtain 12 lead EKG if ordered  - Administer antiarrhythmic and heart rate control medications as ordered  - Monitor electrolytes and administer replacement therapy as ordered  Outcome: Progressing     Problem: RESPIRATORY - ADULT  Goal: Achieves optimal ventilation and  oxygenation  Description: INTERVENTIONS:  - Assess for changes in respiratory status  - Assess for changes in mentation and behavior  - Position to facilitate oxygenation and minimize respiratory effort  - Oxygen administered by appropriate delivery if ordered  - Encourage broncho-pulmonary hygiene including cough, deep breathe, Incentive Spirometry  - Assess the need for suctioning and aspirate as needed  - Assess and instruct to report SOB or any respiratory difficulty  - Respiratory Therapy support as indicated  Outcome: Progressing     Problem: HEMATOLOGIC - ADULT  Goal: Maintains hematologic stability  Description: INTERVENTIONS  - Assess for signs and symptoms of bleeding or hemorrhage  - Monitor labs  - Administer supportive blood products/factors as ordered and appropriate  Outcome: Progressing     Problem: MUSCULOSKELETAL - ADULT  Goal: Maintain or return mobility to safest level of function  Description: INTERVENTIONS:  - Assess patient's ability to carry out ADLs; assess patient's baseline for ADL function and identify physical deficits which impact ability to perform ADLs (bathing, care of mouth/teeth, toileting, grooming, dressing, etc.)  - Assess/evaluate cause of self-care deficits   - Assess range of motion  - Assess patient's mobility  - Assess patient's need for assistive devices and provide as appropriate  - Encourage maximum independence but intervene and supervise when necessary  - Involve family in performance of ADLs  - Assess for home care needs following discharge   - Consider OT consult to assist with ADL evaluation and planning for discharge  - Provide patient education as appropriate  Outcome: Progressing  Goal: Maintain proper alignment of affected body part  Description: INTERVENTIONS:  - Support, maintain and protect limb and body alignment  - Provide patient/ family with appropriate education  Outcome: Progressing     Problem: NEUROSENSORY - ADULT  Goal: Achieves stable or improved  neurological status  Description: INTERVENTIONS  - Monitor and report changes in neurological status  - Monitor vital signs such as temperature, blood pressure, glucose, and any other labs ordered       Outcome: Progressing  Goal: Achieves maximal functionality and self care  Description: INTERVENTIONS  - Monitor swallowing and airway patency with patient fatigue and changes in neurological status  - Encourage and assist patient to increase activity and self care.   - Encourage visually impaired, hearing impaired and aphasic patients to use assistive/communication devices  Outcome: Progressing     Problem: Communication Impairment  Goal: Ability to express needs and understand communication  Description: Assess patient's communication skills and ability to understand information.  Patient will demonstrate use of effective communication techniques, alternative methods of communication and understanding even if not able to speak.     - Encourage communication and provide alternate methods of communication as needed.  - Collaborate with case management/ for discharge needs.  - Include patient/family/caregiver in decisions related to communication.  Outcome: Progressing     Problem: Potential for Aspiration  Goal: Non-ventilated patient's risk of aspiration is minimized  Description: Assess and monitor vital signs, respiratory status, and labs (WBC).  Monitor for signs of aspiration (tachypnea, cough, rales, wheezing, cyanosis, fever).    - Assess and monitor patient's ability to swallow.  - Place patient up in chair to eat if possible.  - HOB up at 90 degrees to eat if unable to get patient up into chair.  - Supervise patient during oral intake.   - Instruct patient/ family to take small bites.  - Instruct patient/ family to take small single sips when taking liquids.  - Follow patient-specific strategies generated by speech pathologist.  Outcome: Progressing     Problem: Neurological Deficit  Goal:  Neurological status is stable or improving  Description: Interventions:  - Monitor and assess patient's level of consciousness, motor function, sensory function, and level of assistance needed for ADLs.   - Monitor and report changes from baseline. Collaborate with interdisciplinary team to initiate plan and implement interventions as ordered.   - Provide and maintain a safe environment.  - Consider seizure precautions.  - Consider fall precautions.  - Consider aspiration precautions.  - Consider bleeding precautions.  Outcome: Progressing     Problem: Activity Intolerance/Impaired Mobility  Goal: Mobility/activity is maintained at optimum level for patient  Description: Interventions:  - Assess and monitor patient  barriers to mobility and need for assistive/adaptive devices.  - Assess patient's emotional response to limitations.  - Collaborate with interdisciplinary team and initiate plans and interventions as ordered.  - Encourage independent activity per ability.  - Maintain proper body alignment.  - Perform active rom as tolerated/ordered.  - Plan activities to conserve energy.  - Turn patient as appropriate  Outcome: Progressing     Problem: Nutrition  Goal: Nutrition/Hydration status is improving  Description: Monitor and assess patient's nutrition/hydration status for malnutrition (ex- brittle hair, bruises, dry skin, pale skin and conjunctiva, muscle wasting, smooth red tongue, and disorientation). Collaborate with interdisciplinary team and initiate plan and interventions as ordered.  Monitor patient's weight and dietary intake as ordered or per policy. Utilize nutrition screening tool and intervene per policy. Determine patient's food preferences and provide high-protein, high-caloric foods as appropriate.     - Assist patient with eating.  - Allow adequate time for meals.  - Encourage patient to take dietary supplement as ordered.  - Collaborate with clinical nutritionist.  - Include  patient/family/caregiver in decisions related to nutrition.  Outcome: Progressing

## 2024-10-12 NOTE — PROGRESS NOTES
"Progress Note - Urology      Patient: Naresh Carpio   : 1960 Sex: male   MRN: [id number]     CSN: [id number]  Unit/Bed#: [address] [place] [address]     SUBJECTIVE:   Patient seen on afternoon rounds  Creatinine trending down dialysis today  Blood at meatus only after Arguelles removed 1 and likely trauma      Objective   Vitals: BP (!) 181/66   Pulse 71   Temp 97.7 °F (36.5 °C) (Tympanic)   Resp 16   Ht 6' 1\" (1.854 m)   Wt 101 kg (221 lb 14.4 oz)   SpO2 96%   BMI 29.28 kg/m²     I/O last 24 hours:  In: 1100 [P.O.:200; I.V.:500; Other:400]  Out: 3100 [Urine:600; Other:2500]      Physical Exam:   General Alert awake   Normocephalic atraumatic PERRLA  Lungs clear bilaterally  Cardiac normal S1 normal S2  Abdomen soft, flank pain  Extremities no edema      Lab Results: CBC:   Lab Results   Component Value Date    WBC 6.51 10/12/2024    HGB 7.3 (L) 10/12/2024    HCT 22.9 (L) 10/12/2024    MCV 92 10/12/2024     10/12/2024    RBC 2.50 (L) 10/12/2024    MCH 29.2 10/12/2024    MCHC 31.9 10/12/2024    RDW 16.9 (H) 10/12/2024    MPV 9.8 10/12/2024    NRBC 0 10/11/2024     CMP:   Lab Results   Component Value Date    CL 94 (L) 10/12/2024    CO2 24 10/12/2024    BUN 40 (H) 10/12/2024    CREATININE 6.92 (H) 10/12/2024    CALCIUM 7.7 (L) 10/12/2024    AST 25 10/08/2024    ALT 39 10/08/2024    ALKPHOS 100 10/08/2024    EGFR 7 10/12/2024     Urinalysis:   Lab Results   Component Value Date    COLORU Yellow 2023    CLARITYU Clear 2023    SPECGRAV 1.010 2023    PHUR 7.0 2023    LEUKOCYTESUR Negative 2023    NITRITE Negative 2023    GLUCOSEU 100 (1/10%) (A) 2023    KETONESU Negative 2023    BILIRUBINUR Negative 2023    BLOODU Small (A) 2023     Urine Culture: No results found for: \"URINECX\"  PSA: No results found for: \"PSA\"      Assessment/ Plan:  Chronic renal failure  Scant urine output  Blood noted at meatus urine or the likely related to Arguelles trauma  Discussed " with patient again to come to office for flex cystoscopy to confirm          Pete Rivera MD

## 2024-10-12 NOTE — PLAN OF CARE
Pt on HD treatment for 4 hours with a UF goal of 2 L as tolerated. Pt on a 2 k 2.5 basim bath for a potassium of 4.8 on 10/12/24.  Problem: METABOLIC, FLUID AND ELECTROLYTES - ADULT  Goal: Electrolytes maintained within normal limits  Description: INTERVENTIONS:  - Monitor labs and assess patient for signs and symptoms of electrolyte imbalances  - Administer electrolyte replacement as ordered  - Monitor response to electrolyte replacements, including repeat lab results as appropriate  - Instruct patient on fluid and nutrition as appropriate  Outcome: Progressing  Goal: Fluid balance maintained  Description: INTERVENTIONS:  - Monitor labs   - Monitor I/O and WT  - Instruct patient on fluid and nutrition as appropriate  - Assess for signs & symptoms of volume excess or deficit  Outcome: Progressing

## 2024-10-12 NOTE — HEMODIALYSIS
Post-Dialysis RN Treatment Note    Blood Pressure:  Pre 161/65 mm/Hg  Post 181/86 mmHg   EDW  108 kg    Weight:  Pre 101 kg   Post 99.8 kg   Mode of weight measurement: Standing Scale   Volume Removed  1600 ml    Treatment duration 240 minutes    NS given  No    Treatment shortened? No   Medications given during Rx Tylenol 1000 mg   Estimated Kt/V  None Reported   Access type: Permacath/TDC   Access Issues: No    Report called to primary nurse   Yes

## 2024-10-12 NOTE — ASSESSMENT & PLAN NOTE
Lab Results   Component Value Date    HGBA1C 5.5 10/08/2024       Recent Labs     10/11/24  1602 10/11/24  2035 10/12/24  0724 10/12/24  1120   POCGLU 106 118 98 91   [facility]  Diabetic diet

## 2024-10-12 NOTE — PROGRESS NOTES
Patient examined spoke to the nurse medical progress notes reviewed and noted.  Patient looks anxious and restless but not confused he is able to express his feelings well he reports that he is not feeling well today he looks restless and anxious but he does not want to take any anxiety pill he is tolerating Lexapro well patient is going through multiple medical problems he has a history of noncompliance of his medical treatment.  Patient remain mentally competent he is not confused.  Patient did not want to do further investigation for low hemoglobin but agreed for further blood work he reports that it is scheduled at 3:00 to repeat the hemoglobin and take it from there.  Nurse reported no behavioral problem.  Patient's hemoglobin was found 7.3 and if it goes further he understood that there is a need to find out the source of blood loss before it get worse.  Medical treatment is in progress.  No psychosis no hallucination.  Patient denies feeling suicidal.  Patient may benefit from short-term rehab.  Patient does have support from his sister.  He offers no new complaints.  Therapy is done with good response.  I will follow-up.

## 2024-10-12 NOTE — ASSESSMENT & PLAN NOTE
Hemoglobin 10.2 on admission, noted to drop to 6.7 after TNK administration  Patient initially declined CT a/p and blood transfusion, eventually agreeable to 1 unit PRBC on 10/10  Urology following for hematuria, urine clear today  Denies black/bloody bowel movements  Aspirin and DVT prophylaxis currently on hold  Hemoglobin 7.3 this morning  Patient would like to repeat hemoglobin later this afternoon before agreeing to CT scan to evaluate for source of bleeding  Follow up repeat h&h

## 2024-10-13 LAB
ANION GAP SERPL CALCULATED.3IONS-SCNC: 12 MMOL/L (ref 4–13)
BUN SERPL-MCNC: 25 MG/DL (ref 5–25)
CALCIUM SERPL-MCNC: 7.7 MG/DL (ref 8.4–10.2)
CHLORIDE SERPL-SCNC: 95 MMOL/L (ref 96–108)
CO2 SERPL-SCNC: 26 MMOL/L (ref 21–32)
CREAT SERPL-MCNC: 4.93 MG/DL (ref 0.6–1.3)
ERYTHROCYTE [DISTWIDTH] IN BLOOD BY AUTOMATED COUNT: 16 % (ref 11.6–15.1)
FERRITIN SERPL-MCNC: 640 NG/ML (ref 24–336)
FOLATE SERPL-MCNC: >22.3 NG/ML
GFR SERPL CREATININE-BSD FRML MDRD: 11 ML/MIN/1.73SQ M
GLUCOSE SERPL-MCNC: 114 MG/DL (ref 65–140)
GLUCOSE SERPL-MCNC: 116 MG/DL (ref 65–140)
GLUCOSE SERPL-MCNC: 125 MG/DL (ref 65–140)
GLUCOSE SERPL-MCNC: 93 MG/DL (ref 65–140)
GLUCOSE SERPL-MCNC: 94 MG/DL (ref 65–140)
HCT VFR BLD AUTO: 24.3 % (ref 36.5–49.3)
HGB BLD-MCNC: 7.8 G/DL (ref 12–17)
IRON SATN MFR SERPL: 39 % (ref 15–50)
IRON SERPL-MCNC: 72 UG/DL (ref 50–212)
MCH RBC QN AUTO: 29.4 PG (ref 26.8–34.3)
MCHC RBC AUTO-ENTMCNC: 32.1 G/DL (ref 31.4–37.4)
MCV RBC AUTO: 92 FL (ref 82–98)
PLATELET # BLD AUTO: 215 THOUSANDS/UL (ref 149–390)
PMV BLD AUTO: 9.5 FL (ref 8.9–12.7)
POTASSIUM SERPL-SCNC: 4.1 MMOL/L (ref 3.5–5.3)
RBC # BLD AUTO: 2.65 MILLION/UL (ref 3.88–5.62)
SODIUM SERPL-SCNC: 133 MMOL/L (ref 135–147)
TIBC SERPL-MCNC: 184 UG/DL (ref 250–450)
UIBC SERPL-MCNC: 112 UG/DL (ref 155–355)
VIT B12 SERPL-MCNC: 364 PG/ML (ref 180–914)
WBC # BLD AUTO: 7.26 THOUSAND/UL (ref 4.31–10.16)

## 2024-10-13 PROCEDURE — 82746 ASSAY OF FOLIC ACID SERUM: CPT

## 2024-10-13 PROCEDURE — 80048 BASIC METABOLIC PNL TOTAL CA: CPT

## 2024-10-13 PROCEDURE — 83550 IRON BINDING TEST: CPT

## 2024-10-13 PROCEDURE — 83540 ASSAY OF IRON: CPT

## 2024-10-13 PROCEDURE — 99232 SBSQ HOSP IP/OBS MODERATE 35: CPT

## 2024-10-13 PROCEDURE — 85027 COMPLETE CBC AUTOMATED: CPT

## 2024-10-13 PROCEDURE — 82948 REAGENT STRIP/BLOOD GLUCOSE: CPT

## 2024-10-13 PROCEDURE — 82607 VITAMIN B-12: CPT

## 2024-10-13 PROCEDURE — NC001 PR NO CHARGE: Performed by: INTERNAL MEDICINE

## 2024-10-13 PROCEDURE — 82728 ASSAY OF FERRITIN: CPT

## 2024-10-13 RX ADMIN — GABAPENTIN 300 MG: 300 CAPSULE ORAL at 08:05

## 2024-10-13 RX ADMIN — PANTOPRAZOLE SODIUM 40 MG: 40 INJECTION, POWDER, FOR SOLUTION INTRAVENOUS at 21:37

## 2024-10-13 RX ADMIN — ALLOPURINOL 100 MG: 100 TABLET ORAL at 08:05

## 2024-10-13 RX ADMIN — HYOSCYAMINE SULFATE 1 APPLICATION: 16 SOLUTION at 08:07

## 2024-10-13 RX ADMIN — ESCITALOPRAM OXALATE 10 MG: 10 TABLET ORAL at 08:05

## 2024-10-13 RX ADMIN — DOCUSATE SODIUM 100 MG: 100 CAPSULE, LIQUID FILLED ORAL at 08:05

## 2024-10-13 RX ADMIN — PANTOPRAZOLE SODIUM 40 MG: 40 INJECTION, POWDER, FOR SOLUTION INTRAVENOUS at 08:06

## 2024-10-13 RX ADMIN — ATORVASTATIN CALCIUM 40 MG: 40 TABLET, FILM COATED ORAL at 17:13

## 2024-10-13 RX ADMIN — NIFEDIPINE 30 MG: 30 TABLET, EXTENDED RELEASE ORAL at 17:15

## 2024-10-13 RX ADMIN — GABAPENTIN 300 MG: 300 CAPSULE ORAL at 17:15

## 2024-10-13 NOTE — QUICK NOTE
Patient had hemodialysis yesterday.  Vital signs are stable.  Next hemodialysis session will be tomorrow.  Please call with questions in interim.

## 2024-10-13 NOTE — ASSESSMENT & PLAN NOTE
Hemoglobin 10.2 on admission, noted to drop to 6.7 after TNK administration  Patient initially declined CT a/p and blood transfusion, eventually agreeable to 1 unit PRBC on 10/10  Urology following for hematuria, urine now clear  Denies black/bloody bowel movements  Aspirin and DVT prophylaxis currently on hold  Hemoglobin 7.8 this morning  Patient reports he still would like to hold off CT a/p to evaluate for source of bleeding  Check iron panel, B12, folate  Repeat cbc in am

## 2024-10-13 NOTE — ASSESSMENT & PLAN NOTE
CT head, CT c-spine, and XR R knee negative for acute abnormality on admission  PT/OT  Fall precautions   Please see message and advise. Ok for nurse visit for varicella and MMR vaccines?    Last seen in our office 10/28/19

## 2024-10-13 NOTE — ASSESSMENT & PLAN NOTE
Lab Results   Component Value Date    EGFR 11 10/13/2024    EGFR 7 10/12/2024    EGFR 10 10/11/2024    CREATININE 4.93 (H) 10/13/2024    CREATININE 6.92 (H) 10/12/2024    CREATININE 5.25 (H) 10/11/2024     Patient gets dialysis Tuesday, Thursday, Saturday and has missed dialysis for about a week since being discharged from short-term rehab.  Patient was scheduled for dialysis at a different facility but family stated that they cannot transport him for his appointments  Nephrology following  Continued on TTS schedule  Continue Renagel for hyperphosphatemia

## 2024-10-13 NOTE — PROGRESS NOTES
Patient examined spoke to the nurse medical progress notes reviewed and noted.  Patient is in a good mood today he reports that he was not feeling well yesterday and he hates to do that he was cranky and anxious today he is in a good mood he shakes hands and communicate his feelings well patient's last hemoglobin was little bit better 7.8 and it will be checked tomorrow.  Urology notes reviewed and noted.  Patient offers no new complaints he is suffering from multiple medical problems he has been on hemodialysis he communicates his feelings well nurse reports that he is doing well today.  No behavior.  Stable with depression no psychosis no hallucination no agitation denies feeling suicidal.  Patient is tolerating Lexapro well and he does not think that he needs anxiety medicine.  Patient is reminded to comply with his medical care after the discharge he agreed.  Patient was also recommended to consider short-term rehab once he is medically stable he seems to be accepting it.  Therapy is done with good response.  I will continue his medications and follow-up.

## 2024-10-13 NOTE — PROGRESS NOTES
Progress Note - Hospitalist   Name: Naresh Carpio 64 y.o. male I MRN: [id number]  Unit/Bed#: 4 [address]  Date of Admission: 10/8/2024   Date of Service: 10/13/2024 I Hospital Day: 4    Assessment & Plan  Toxic metabolic encephalopathy  RR called 10/8 for acute change in mental status with last well known 12:58, stroke alert called 1:16 pm  NIH 8 and decision was made to give TNK after initial CT head negative for bleed  CTA head/neck showed intracranial ICA atherosclerosis - outpatient vascular surgery referral  MRI brain without evidence of acute CVA  Suspect metabolic encephalopathy in setting of ESRD with missed dialysis  Patient back at baseline mentation  Continue to monitor  ESRD (end stage renal disease) ([facility])  Lab Results   Component Value Date    EGFR 11 10/13/2024    EGFR 7 10/12/2024    EGFR 10 10/11/2024    CREATININE 4.93 (H) 10/13/2024    CREATININE 6.92 (H) 10/12/2024    CREATININE 5.25 (H) 10/11/2024     Patient gets dialysis Tuesday, Thursday, Saturday and has missed dialysis for about a week since being discharged from short-term rehab.  Patient was scheduled for dialysis at a different facility but family stated that they cannot transport him for his appointments  Nephrology following  Continued on TTS schedule  Continue Renagel for hyperphosphatemia  Hematuria  Patient initially had gaitan placed for urinary retention, requested it be removed  Noted to have hematuria after gaitan removal with clots  Urinary retention protocol  Aspirin and DVT prophylaxis currently on held  Urology consulted  Patient notes clear urine today  If ongoing hematuria, would need outpatient follow up for possible cystoscopy  Acute on chronic anemia  Hemoglobin 10.2 on admission, noted to drop to 6.7 after TNK administration  Patient initially declined CT a/p and blood transfusion, eventually agreeable to 1 unit PRBC on 10/10  Urology following for hematuria, urine now clear  Denies black/bloody bowel  movements  Aspirin and DVT prophylaxis currently on hold  Hemoglobin 7.8 this morning  Patient reports he still would like to hold off CT a/p to evaluate for source of bleeding  Check iron panel, B12, folate  Repeat cbc in am  Type 2 diabetes mellitus, without long-term current use of insulin ([facility])  Lab Results   Component Value Date    HGBA1C 5.5 10/08/2024       Recent Labs     10/12/24  1652 10/12/24  2043 10/13/24  0728 10/13/24  1112   POCGLU 124 106 93 114   SSI  Diabetic diet  Status post fall  CT head, CT c-spine, and XR R knee negative for acute abnormality on admission  PT/OT  Fall precautions  Hyperkalemia  In setting of ESRD and missed dialysis  Currently improved  PVD (peripheral vascular disease) ([facility])    VTE Pharmacologic Prophylaxis: VTE Score: 2 Low Risk (Score 0-2) - Encourage Ambulation.    Mobility:   Basic Mobility Inpatient Raw Score: 20  JH-HLM Goal: 6: Walk 10 steps or more  JH-HLM Achieved: 6: Walk 10 steps or more  JH-HLM Goal achieved. Continue to encourage appropriate mobility.    Patient Centered Rounds: I performed bedside rounds with nursing staff today.   Discussions with Specialists or Other Care Team Provider: nephrology, rn    Education and Discussions with Family / Patient: Patient declined call to .     Current Length of Stay: 4 day(s)  Current Patient Status: Inpatient   Certification Statement: The patient will continue to require additional inpatient hospital stay due to anemia, repeat cbc, discharge planning  Discharge Plan: Anticipate discharge in 24-48 hrs to discharge location to be determined pending rehab evaluations.    Code Status: Level 1 - Full Code    Subjective   Patient reports episode of vomiting overnight. Denied hematemesis. Reports currently feeling better with no nausea or abdominal pain. Denies chest pain or shortness of breath. Reports he would still just like to continue to monitor hemoglobin for now, does not want any scans today. Tolerating  diet.    Objective :  Temp:  [97.7 °F (36.5 °C)-98.2 °F (36.8 °C)] 98.2 °F (36.8 °C)  HR:  [69-72] 69  BP: (166-181)/(63-71) 166/63  Resp:  [14-20] 14  SpO2:  [93 %-97 %] 95 %  O2 Device: None (Room air)    Body mass index is 28.56 kg/m².     Input and Output Summary (last 24 hours):     Intake/Output Summary (Last 24 hours) at 10/13/2024 1146  Last data filed at 10/13/2024 1001  Gross per 24 hour   Intake 610 ml   Output 2800 ml   Net -2190 ml       Physical Exam  Vitals and nursing note reviewed.   Constitutional:       General: He is not in acute distress.     Appearance: He is well-developed.   Cardiovascular:      Rate and Rhythm: Normal rate and regular rhythm.   Pulmonary:      Effort: Pulmonary effort is normal. No respiratory distress.      Breath sounds: Normal breath sounds.   Abdominal:      General: Bowel sounds are normal. There is no distension.      Palpations: Abdomen is soft.      Tenderness: There is no abdominal tenderness.   Musculoskeletal:         General: No swelling.   Skin:     General: Skin is warm and dry.      Capillary Refill: Capillary refill takes less than 2 seconds.   Neurological:      General: No focal deficit present.      Mental Status: He is alert and oriented to person, place, and time.   Psychiatric:         Mood and Affect: Mood normal.       Lines/Drains:  Lines/Drains/Airways       Active Status       Name Placement date Placement time Site Days    HD Permanent Double Catheter --  --  Internal jugular  --                      Lab Results: I have reviewed the following results:   Results from last 7 days   Lab Units 10/13/24  0958 10/12/24  0435 10/11/24  0510   WBC Thousand/uL 7.26   < > 6.51   HEMOGLOBIN g/dL 7.8*   < > 7.9*   HEMATOCRIT % 24.3*   < > 24.1*   PLATELETS Thousands/uL 215   < > 207   SEGS PCT %  --   --  74   LYMPHO PCT %  --   --  12*   MONO PCT %  --   --  10   EOS PCT %  --   --  4    < > = values in this interval not displayed.     Results from last 7  days   Lab Units 10/13/24  0958 10/08/24  1407 10/08/24  0454   SODIUM mmol/L 133*   < > 138   POTASSIUM mmol/L 4.1   < > 5.8*   CHLORIDE mmol/L 95*   < > 106   CO2 mmol/L 26   < > 16*   BUN mg/dL 25   < > 94*   CREATININE mg/dL 4.93*   < > 11.08*   ANION GAP mmol/L 12   < > 16*   CALCIUM mg/dL 7.7*   < > 8.3*   ALBUMIN g/dL  --   --  4.2   TOTAL BILIRUBIN mg/dL  --   --  0.52   ALK PHOS U/L  --   --  100   ALT U/L  --   --  39   AST U/L  --   --  25   GLUCOSE RANDOM mg/dL 94   < > 139    < > = values in this interval not displayed.         Results from last 7 days   Lab Units 10/13/24  1112 10/13/24  0728 10/12/24  2043 10/12/24  1652 10/12/24  1120 10/12/24  0724 10/11/24  2035 10/11/24  1602 10/11/24  1048 10/11/24  0719 10/10/24  2018 10/10/24  1535   POC GLUCOSE mg/dl 114 93 106 124 91 98 118 106 115 100 119 104     Results from last 7 days   Lab Units 10/08/24  0839   HEMOGLOBIN A1C % 5.5           Recent Cultures (last 7 days):         Imaging Results Review: No pertinent imaging studies reviewed.  Other Study Results Review: No additional pertinent studies reviewed.    Last 24 Hours Medication List:     Current Facility-Administered Medications:     acetaminophen (Ofirmev) injection 1,000 mg, Q6H PRN, Last Rate: Stopped (10/12/24 1010)    allopurinol (ZYLOPRIM) tablet 100 mg, Daily    atorvastatin (LIPITOR) tablet 40 mg, Daily With Dinner    docusate sodium (COLACE) capsule 100 mg, BID    escitalopram (LEXAPRO) tablet 10 mg, Daily    gabapentin (NEURONTIN) capsule 300 mg, BID    hydrALAZINE (APRESOLINE) injection 10 mg, Q6H PRN    influenza vaccine, high-dose (Fluzone High-Dose) IM injection 0.5 mL, Once PRN    insulin lispro (HumALOG/ADMELOG) 100 units/mL subcutaneous injection 1-6 Units, TID AC **AND** Fingerstick Glucose (POCT), TID AC    labetalol (NORMODYNE) injection 10 mg, Q4H PRN    NIFEdipine (PROCARDIA XL) 24 hr tablet 30 mg, After Dinner    ondansetron (ZOFRAN) injection 4 mg, Q6H PRN     pantoprazole (PROTONIX) injection 40 mg, Q12H KEMAL    polyethylene glycol (MIRALAX) packet 17 g, Daily PRN    sodium hypochlorite (DAKIN'S HALF-STRENGTH) 0.25 percent topical solution 1 Application, Daily    Administrative Statements   Today, Patient Was Seen By: Nallely Pisano PA-C    **Please Note: This note may have been constructed using a voice recognition system.**

## 2024-10-13 NOTE — PROGRESS NOTES
"Progress Note - Urology      Patient: Naresh Carpio   : 1960 Sex: male   MRN: [id number]     CSN: [id number]  Unit/Bed#: [address] [place] [address]     SUBJECTIVE:   Patient seen on  rounds  Creatinine trending down dialysis today  Blood at meatus only after Arguelles removed 1 and likely trauma      Objective   Vitals: /61   Pulse 79   Temp 98.3 °F (36.8 °C)   Resp 15   Ht 6' 1\" (1.854 m)   Wt 98.2 kg (216 lb 7.9 oz)   SpO2 95%   BMI 28.56 kg/m²     I/O last 24 hours:  In: 1210 [P.O.:300; I.V.:510; Other:400]  Out: 2950 [Urine:450; Other:2500]      Physical Exam:   General Alert awake   Normocephalic atraumatic PERRLA  Lungs clear bilaterally  Cardiac normal S1 normal S2  Abdomen soft, flank pain  Extremities no edema      Lab Results: CBC:   Lab Results   Component Value Date    WBC 7.26 10/13/2024    HGB 7.8 (L) 10/13/2024    HCT 24.3 (L) 10/13/2024    MCV 92 10/13/2024     10/13/2024    RBC 2.65 (L) 10/13/2024    MCH 29.4 10/13/2024    MCHC 32.1 10/13/2024    RDW 16.0 (H) 10/13/2024    MPV 9.5 10/13/2024    NRBC 0 10/11/2024     CMP:   Lab Results   Component Value Date    CL 95 (L) 10/13/2024    CO2 26 10/13/2024    BUN 25 10/13/2024    CREATININE 4.93 (H) 10/13/2024    CALCIUM 7.7 (L) 10/13/2024    AST 25 10/08/2024    ALT 39 10/08/2024    ALKPHOS 100 10/08/2024    EGFR 11 10/13/2024     Urinalysis:   Lab Results   Component Value Date    COLORU Yellow 2023    CLARITYU Clear 2023    SPECGRAV 1.010 2023    PHUR 7.0 2023    LEUKOCYTESUR Negative 2023    NITRITE Negative 2023    GLUCOSEU 100 (1/10%) (A) 2023    KETONESU Negative 2023    BILIRUBINUR Negative 2023    BLOODU Small (A) 2023     Urine Culture: No results found for: \"URINECX\"  PSA: No results found for: \"PSA\"      Assessment/ Plan:  Chronic renal failure  Scant urine output  Blood noted at meatus urine or the likely related to Arguelles trauma  Discussed with patient again to come to " office for flex cystoscopy to confirm          Pete Rivera MD

## 2024-10-13 NOTE — ASSESSMENT & PLAN NOTE
Lab Results   Component Value Date    HGBA1C 5.5 10/08/2024       Recent Labs     10/12/24  1652 10/12/24  2043 10/13/24  0728 10/13/24  1112   POCGLU 124 106 93 114   [facility]  Diabetic diet

## 2024-10-13 NOTE — ASSESSMENT & PLAN NOTE
Initial Clinical Review    Age/Sex: 60 y o  male admitted on 6/20 for elective surgery - OR    Surgery Date: 6/20    Procedure: S/P KNEE : COMPLETE REPLACEMENT (Left Knee)    Anesthesia: Regional, Spinal    Admission Orders: Date/Time/Statement: Inpatient 6/20/18 @ 1138 Med Surg     Orders Placed This Encounter   Procedures    Inpatient Admission     Standing Status:   Standing     Number of Occurrences:   1     Order Specific Question:   Admitting Physician     Answer:   Gi Rock [[id number]]     Order Specific Question:   Level of Care     Answer:   Med Surg [16]     Order Specific Question:   Estimated length of stay     Answer:   Inpatient Only Surgery       Vital Signs: /88 (BP Location: Right arm)   Pulse 76   Temp (!) 97 4 °F (36 3 °C) (Oral)   Resp 18   Ht 5' 8" (1 727 m)   Wt 89 7 kg (197 lb 12 oz)   SpO2 93%   BMI 30 07 kg/m²     Diet:        Diet Orders            Start     Ordered    06/20/18 1325  Diet Regular; Regular House  Diet effective now     Question Answer Comment   Diet Type Regular    Regular Regular House    RD to adjust diet per protocol?  Yes        06/20/18 1324          Mobility: WBAT LLE  PT/OT eval and treat    DVT Prophylaxis: Foot Pump    Scheduled Meds:  Current Facility-Administered Medications:  Cefazolin  Intravenous x3   allopurinol 100 mg Oral BID   amLODIPine 10 mg Oral Daily   ascorbic acid 500 mg Oral BID   aspirin 81 mg Oral Every Other Day   carvedilol 25 mg Oral BID With Meals   docusate sodium 100 mg Oral BID   enoxaparin 40 mg Subcutaneous Daily   ferrous sulfate 325 mg Oral BID With Meals   folic acid 419 mcg Oral Daily   furosemide 40 mg Oral Daily   insulin lispro 1-5 Units Subcutaneous TID AC   metFORMIN 500 mg Oral Daily   olmesartan 40 mg Oral Daily   pravastatin 40 mg Oral Daily With Dinner   senna 1 tablet Oral Daily   traMADol 50 mg Oral Q6H [address]     Continuous Infusions:  lactated ringers 1 5 mL/kg/hr Last Rate: 1 5 mL/kg/hr (06/20/18 2125)     PRN RR called 10/8 for acute change in mental status with last well known 12:58, stroke alert called 1:16 pm  NIH 8 and decision was made to give TNK after initial CT head negative for bleed  CTA head/neck showed intracranial ICA atherosclerosis - outpatient vascular surgery referral  MRI brain without evidence of acute CVA  Suspect metabolic encephalopathy in setting of ESRD with missed dialysis  Patient back at baseline mentation  Continue to monitor   Meds:  HYDROmorphone    metoclopramide    Ondansetron Iv x2    oxyCODONE po x5

## 2024-10-14 PROBLEM — N18.5 CHRONIC KIDNEY DISEASE-MINERAL BONE DISORDER (CKD-MBD) WITH STAGE 5 CHRONIC KIDNEY DISEASE, ON CHRONIC DIALYSIS (HCC): Status: ACTIVE | Noted: 2024-10-14

## 2024-10-14 PROBLEM — E83.9 CHRONIC KIDNEY DISEASE-MINERAL BONE DISORDER (CKD-MBD) WITH STAGE 5 CHRONIC KIDNEY DISEASE, ON CHRONIC DIALYSIS (HCC): Status: ACTIVE | Noted: 2024-10-14

## 2024-10-14 PROBLEM — Z99.2 CHRONIC KIDNEY DISEASE-MINERAL BONE DISORDER (CKD-MBD) WITH STAGE 5 CHRONIC KIDNEY DISEASE, ON CHRONIC DIALYSIS (HCC): Status: ACTIVE | Noted: 2024-10-14

## 2024-10-14 PROBLEM — M89.9 CHRONIC KIDNEY DISEASE-MINERAL BONE DISORDER (CKD-MBD) WITH STAGE 5 CHRONIC KIDNEY DISEASE, ON CHRONIC DIALYSIS (HCC): Status: ACTIVE | Noted: 2024-10-14

## 2024-10-14 PROBLEM — E87.5 HYPERKALEMIA: Status: RESOLVED | Noted: 2024-04-06 | Resolved: 2024-10-14

## 2024-10-14 LAB
ANION GAP SERPL CALCULATED.3IONS-SCNC: 10 MMOL/L (ref 4–13)
BUN SERPL-MCNC: 38 MG/DL (ref 5–25)
CALCIUM SERPL-MCNC: 7.6 MG/DL (ref 8.4–10.2)
CHLORIDE SERPL-SCNC: 95 MMOL/L (ref 96–108)
CO2 SERPL-SCNC: 26 MMOL/L (ref 21–32)
CREAT SERPL-MCNC: 6.44 MG/DL (ref 0.6–1.3)
ERYTHROCYTE [DISTWIDTH] IN BLOOD BY AUTOMATED COUNT: 16 % (ref 11.6–15.1)
GFR SERPL CREATININE-BSD FRML MDRD: 8 ML/MIN/1.73SQ M
GLUCOSE SERPL-MCNC: 110 MG/DL (ref 65–140)
GLUCOSE SERPL-MCNC: 112 MG/DL (ref 65–140)
GLUCOSE SERPL-MCNC: 132 MG/DL (ref 65–140)
GLUCOSE SERPL-MCNC: 146 MG/DL (ref 65–140)
GLUCOSE SERPL-MCNC: 94 MG/DL (ref 65–140)
HCT VFR BLD AUTO: 25.2 % (ref 36.5–49.3)
HGB BLD-MCNC: 8.1 G/DL (ref 12–17)
MCH RBC QN AUTO: 29.7 PG (ref 26.8–34.3)
MCHC RBC AUTO-ENTMCNC: 32.1 G/DL (ref 31.4–37.4)
MCV RBC AUTO: 92 FL (ref 82–98)
PLATELET # BLD AUTO: 211 THOUSANDS/UL (ref 149–390)
PMV BLD AUTO: 9.8 FL (ref 8.9–12.7)
POTASSIUM SERPL-SCNC: 4.5 MMOL/L (ref 3.5–5.3)
RBC # BLD AUTO: 2.73 MILLION/UL (ref 3.88–5.62)
SODIUM SERPL-SCNC: 131 MMOL/L (ref 135–147)
WBC # BLD AUTO: 6.19 THOUSAND/UL (ref 4.31–10.16)

## 2024-10-14 PROCEDURE — 99232 SBSQ HOSP IP/OBS MODERATE 35: CPT | Performed by: STUDENT IN AN ORGANIZED HEALTH CARE EDUCATION/TRAINING PROGRAM

## 2024-10-14 PROCEDURE — 85027 COMPLETE CBC AUTOMATED: CPT

## 2024-10-14 PROCEDURE — 99232 SBSQ HOSP IP/OBS MODERATE 35: CPT | Performed by: NURSE PRACTITIONER

## 2024-10-14 PROCEDURE — 82948 REAGENT STRIP/BLOOD GLUCOSE: CPT

## 2024-10-14 PROCEDURE — 80048 BASIC METABOLIC PNL TOTAL CA: CPT

## 2024-10-14 RX ORDER — NIFEDIPINE 30 MG/1
60 TABLET, EXTENDED RELEASE ORAL
Status: DISCONTINUED | OUTPATIENT
Start: 2024-10-14 | End: 2024-10-22 | Stop reason: HOSPADM

## 2024-10-14 RX ADMIN — ALLOPURINOL 100 MG: 100 TABLET ORAL at 09:49

## 2024-10-14 RX ADMIN — HYOSCYAMINE SULFATE 1 APPLICATION: 16 SOLUTION at 12:00

## 2024-10-14 RX ADMIN — PANTOPRAZOLE SODIUM 40 MG: 40 INJECTION, POWDER, FOR SOLUTION INTRAVENOUS at 09:49

## 2024-10-14 RX ADMIN — ESCITALOPRAM OXALATE 10 MG: 10 TABLET ORAL at 09:49

## 2024-10-14 RX ADMIN — GABAPENTIN 300 MG: 300 CAPSULE ORAL at 17:46

## 2024-10-14 RX ADMIN — ATORVASTATIN CALCIUM 40 MG: 40 TABLET, FILM COATED ORAL at 16:51

## 2024-10-14 RX ADMIN — PANTOPRAZOLE SODIUM 40 MG: 40 INJECTION, POWDER, FOR SOLUTION INTRAVENOUS at 20:13

## 2024-10-14 RX ADMIN — NIFEDIPINE 60 MG: 30 TABLET, EXTENDED RELEASE ORAL at 17:44

## 2024-10-14 RX ADMIN — GABAPENTIN 300 MG: 300 CAPSULE ORAL at 09:48

## 2024-10-14 NOTE — ASSESSMENT & PLAN NOTE
Lab Results   Component Value Date    EGFR 8 10/14/2024    EGFR 11 10/13/2024    EGFR 7 10/12/2024    CREATININE 6.44 (H) 10/14/2024    CREATININE 4.93 (H) 10/13/2024    CREATININE 6.92 (H) 10/12/2024     Patient gets dialysis Tuesday, Thursday, Saturday and has missed dialysis for about a week since being discharged from short-term rehab.  Patient was scheduled for dialysis at a different facility but family stated that they cannot transport him for his appointments  Nephrology following  Continued on TTS schedule  Continue Renagel for hyperphosphatemia

## 2024-10-14 NOTE — ASSESSMENT & PLAN NOTE
Lab Results   Component Value Date    EGFR 8 10/14/2024    EGFR 11 10/13/2024    EGFR 7 10/12/2024    CREATININE 6.44 (H) 10/14/2024    CREATININE 4.93 (H) 10/13/2024    CREATININE 6.92 (H) 10/12/2024   Phosphorus at goal 5.4  No indication of binders at this time

## 2024-10-14 NOTE — PROGRESS NOTES
"  Intended visit with pt \"Edward\" who is resting comfortably at this time. Interfaith blessing offered outside of pt room. Available to follow upon request.     Thank you!        10/14/24 1100   Clinical Encounter Type   Visited With Patient   Routine Visit Introduction       "

## 2024-10-14 NOTE — ASSESSMENT & PLAN NOTE
Lab Results   Component Value Date    EGFR 8 10/14/2024    EGFR 11 10/13/2024    EGFR 7 10/12/2024    CREATININE 6.44 (H) 10/14/2024    CREATININE 4.93 (H) 10/13/2024    CREATININE 6.92 (H) 10/12/2024   Patient is due for dialysis in a.m. as per discussion with nephrology

## 2024-10-14 NOTE — CASE MANAGEMENT
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, on chronic dialysis ([facility]) 10/14/2024     Hematuria 10/10/2024     Falls 10/08/2024     Toxic metabolic encephalopathy 10/08/2024     PVD (peripheral vascular disease) ([facility]) 10/08/2024     Closed fracture of right pelvis ([facility]) 2024     Bilateral sacral fracture, closed ([facility]) 2024     Difficulty with speech 2024     History of amputation of hallux ([facility]) 2024     Hypertensive urgency 2024     Facial cellulitis 2024     Constipation 2023     Bradycardia 2023     Cellulitis of right foot      Polymicrobial bacterial infection 2023     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 mellitus, with necrosis of bone ([facility])      Acquired deformity of foot, right      Charcot's joint      Subacute osteomyelitis of right foot ([facility])      Open wound of right foot 2023     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 mellitus, with bone involvement without evidence of necrosis ([facility])      Diabetic ulcer of left midfoot associated with type 2 diabetes mellitus, limited to breakdown of skin ([facility])      Diabetic polyneuropathy associated with type 2 diabetes mellitus ([facility])      Diabetes mellitus type 2 with peripheral artery disease ([facility])      History of amputation of lesser toe of left foot ([facility])      Onychomycosis      Status post fall 2023     Traumatic rhabdomyolysis ([facility]) 2023     Leukocytosis 2023     Osteoarthritis of left hip 2022     Severe Left Orchalgia 2022     Right shoulder pain  07/26/2022     Left hip pain 07/06/2022     Hemodialysis status ([facility])      Hypervolemia      Acute on chronic anemia 01/23/2022     ESRD (end stage renal disease) ([facility]) 01/21/2022     Type 2 diabetes mellitus, without long-term current use of insulin ([facility])      Hypertension      History of TIA (transient ischemic attack)      T10 vertebral fracture ([facility])        LOS (days): 5  Geometric Mean LOS (GMLOS) (days): 3.6  Days to GMLOS:-1.4     OBJECTIVE:  Risk of Unplanned Readmission Score: 28.45      Current admission status: Inpatient   [facility] Pharm[facility]   [id number] [place] [place] [facility] [address]  [facility]  [address] [facility] [address]  [facility] [address]  Phone: [phone] Fax: [phone]    Primary Care Provider: No primary care provider on file.    Primary Insurance: MEDICARE  Secondary Insurance:     DISCHARGE DETAILS:    Discharge planning discussed with:: Patient  Freedom of Choice: Yes  Comments - Freedom of Choice: SW net bedside with patient to provide updated responses from STR referrals. Per admin at [facility], patient has 14 skilled days left. He has not been cooperating with the facility in devloping a payment plan for his outstanding balance from his previously used copay days at facility. Until patient places a card on file and makes payment arrangements facility will not accept him back. SW advised that local facilities are requiring patient to pay for transportation to/from HD at [facility]. Patient states that he will not do this and that his choice would then be to discharge home. SW offered referral to [facility] services. Patient refused referral to [facility].     Were Treatment Team discharge recommendations reviewed with patient/caregiver?: Yes  Did patient/caregiver verbalize understanding of patient care needs?: Yes  Were patient/caregiver advised of the risks associated with not following Treatment Team discharge recommendations?: Yes     Requested Home Health Care         Is  the patient interested in HHC at discharge?: No    Treatment Team Recommendation: Home with Home Health Care  Discharge Destination Plan:: Home     IMM Given (Date):: 10/14/24  IMM Given to:: Patient (IMM#2 reviewed with patient. Patient gave verbal understanding and signed. Copy placed in scan bin.)

## 2024-10-14 NOTE — ASSESSMENT & PLAN NOTE
Hemoglobin 10.2 on admission, noted to drop to 6.7 after TNK administration  Patient initially declined CT a/p and blood transfusion, eventually agreeable to 1 unit PRBC on 10/10  Urology following for hematuria, urine now clear  Denies black/bloody bowel movements  Aspirin and DVT prophylaxis currently on hold  Hemoglobin 8.1 this morning  Patient reports he still would like to hold off CT a/p to evaluate for source of bleeding  Check iron panel, B12, folate  Repeat cbc in am

## 2024-10-14 NOTE — PROGRESS NOTES
Progress Note - Nephrology   Name: Naresh Carpio 64 y.o. male I MRN: [id number]  Unit/Bed#: 4 [facility] 414-01 I Date of Admission: 10/8/2024   Date of Service: 10/14/2024 I Hospital Day: 5     Assessment & Plan  ESRD (end stage renal disease) ([facility])  Lab Results   Component Value Date    EGFR 8 10/14/2024    EGFR 11 10/13/2024    EGFR 7 10/12/2024    CREATININE 6.44 (H) 10/14/2024    CREATININE 4.93 (H) 10/13/2024    CREATININE 6.92 (H) 10/12/2024   #ESRD on HD TTS:  Dialysis unit/days:[facility]   Access: PermCath  Had dialysis Saturday, well-tolerated UF 2 L  Plan for next dialysis tomorrow, Tuesday  Renal Diet  Fluid restriction 1.5L/d  Adjust medications to GFR<10  Avoid opioids         Type 2 diabetes mellitus, without long-term current use of insulin ([facility])  Lab Results   Component Value Date    HGBA1C 5.5 10/08/2024       Recent Labs     10/13/24  1112 10/13/24  1609 10/13/24  2048 10/14/24  0736   POCGLU 114 116 125 112       Blood Sugar Average: Last 72 hrs:  (P) 109.9756651431306531    HbA1c 5.5  Advised to maintain a good DM control   Maintain healthy diet (vegetables, fruits, whole grains, nonfat or low fat)  Weight loss  Physical activity (5 to 10 minutes to start the increase to 30 min a day)    Acute on chronic anemia  Current hemoglobin: 8.1 mg/dL  Multifactorial secondary to ESRD and hematuria  Treatment:  Transfuse for hemoglobin less than 7.0 per primary service    If patient requires blood transfusion we will try to coordinate during HD unless there is an urgent indication     Status post fall    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, on chronic dialysis ([facility])  Lab Results   Component Value Date    EGFR 8 10/14/2024    EGFR 11 10/13/2024    EGFR 7 10/12/2024    CREATININE 6.44 (H) 10/14/2024    CREATININE 4.93 (H) 10/13/2024    CREATININE 6.92 (H) 10/12/2024   Phosphorus at goal 5.4  No indication of binders at this time  Hypertension  Volume:  Hypervolemic  Blood pressure: Hypertensive, /63  Recommend low-sodium diet  Fluid restriction as above  Increase nifedipine 30 mg daily    I have reviewed the nephrology recommendations including plan for dialysis tomorrow, with primary team, and we are in agreement with renal plan including the information outlined above. I have discussed the above management plan in detail with the primary service.     Subjective   Brief History of Admission - 63 yo man with PMH of ESRD on HD p/ after a fall.  Nephrology is consulted for management of ESRD    Patient had dialysis Saturday, well-tolerated.  No shortness of breath, no chest pain    Objective :  Temp:  [97.9 °F (36.6 °C)-98.7 °F (37.1 °C)] 97.9 °F (36.6 °C)  HR:  [65-79] 69  BP: (159-169)/(61-65) 159/65  Resp:  [15-16] 15  SpO2:  [93 %-97 %] 97 %    Current Weight: Weight - Scale: 97.2 kg (214 lb 4.6 oz)  First Weight: Weight - Scale: 112 kg (245 lb 13 oz)  I/O         10/12 0701  10/13 0700 10/13 0701  10/14 0700 10/14 0701  10/15 0700    P.O.  700     I.V. (mL/kg) 500 (5.1) 20 (0.2)     Other 400      Total Intake(mL/kg) 900 (9.2) 720 (7.4)     Urine (mL/kg/hr) 450 (0.2) 1000 (0.4)     Other 2500      Total Output 2950 1000     Net -2050 -280                  Physical Exam  General:  no acute distress at this time  Skin:  No acute rash  Eyes:  No scleral icterus and noninjected  ENT:  mucous membranes moist  Neck:  no carotid bruits  Chest:  Clear to auscultation percussion, good respiratory effort, no use of accessory respiratory muscles  CVS:  Regular rate and rhythm without rub   Abdomen:  soft and nontender   Extremities: no significant lower extremity edema  Neuro:  No gross focality  Psych:  Alert , cooperative   Dialysis access: PermCath    Medications:    Current Facility-Administered Medications:     acetaminophen (Ofirmev) injection 1,000 mg, 1,000 mg, Intravenous, Q6H PRN, Lore Silver DO, Stopped at 10/12/24 1010    allopurinol (ZYLOPRIM)  tablet 100 mg, 100 mg, Oral, Daily, Lore Revankar, DO, 100 mg at 10/14/24 0949    atorvastatin (LIPITOR) tablet 40 mg, 40 mg, Oral, Daily With Dinner, Lore Revankar, DO, 40 mg at 10/13/24 1713    docusate sodium (COLACE) capsule 100 mg, 100 mg, Oral, BID, Lore Revankar, DO, 100 mg at 10/13/24 0805    escitalopram (LEXAPRO) tablet 10 mg, 10 mg, Oral, Daily, Lore Revankar, DO, 10 mg at 10/14/24 0949    gabapentin (NEURONTIN) capsule 300 mg, 300 mg, Oral, BID, Gennaro Penainic, DO, 300 mg at 10/14/24 0948    hydrALAZINE (APRESOLINE) injection 10 mg, 10 mg, Intravenous, Q6H PRN, Lore Revankar, DO, 10 mg at 10/10/24 1944    influenza vaccine, high-dose (Fluzone High-Dose) IM injection 0.5 mL, 0.5 mL, Intramuscular, Once PRN, Lore Revankar, DO    insulin lispro (HumALOG/ADMELOG) 100 units/mL subcutaneous injection 1-6 Units, 1-6 Units, Subcutaneous, TID AC **AND** Fingerstick Glucose (POCT), , , TID AC, Lore Revankar, DO    labetalol (NORMODYNE) injection 10 mg, 10 mg, Intravenous, Q4H PRN, Lore Revankar, DO, 10 mg at 10/10/24 1822    NIFEdipine (PROCARDIA XL) 24 hr tablet 60 mg, 60 mg, Oral, After Dinner, Joselyn Reyes Bahamonde, MD    ondansetron (ZOFRAN) injection 4 mg, 4 mg, Intravenous, Q6H PRN, GRANT Rich, 4 mg at 10/12/24 2128    pantoprazole (PROTONIX) injection 40 mg, 40 mg, Intravenous, Q12H KEMAL, Lore Revankar, DO, 40 mg at 10/14/24 0949    polyethylene glycol (MIRALAX) packet 17 g, 17 g, Oral, Daily PRN, Lore Revankar, DO, 17 g at 10/10/24 1841    sodium hypochlorite (DAKIN'S HALF-STRENGTH) 0.25 percent topical solution 1 Application, 1 Application, Irrigation, Daily, Lore Silver DO, 1 Application at 10/13/24 0807      Lab Results: I have reviewed the following results:  Results from last 7 days   Lab Units 10/14/24  0614 10/13/24  0958 10/12/24  1509 10/12/24  0435 10/11/24  0510 10/10/24  1638 10/10/24  0610 10/09/24  2004 10/09/24  1620 10/09/24  1544 10/08/24  1407  "10/08/24  0454 10/08/24  0216   WBC Thousand/uL 6.19 7.26  --  6.51 6.51  --  6.49  --  5.67 5.74  --  9.63 7.36   HEMOGLOBIN g/dL 8.1* 7.8* 7.9* 7.3* 7.9* 8.1* 6.9* 6.9* 6.7* 6.9*  --  10.2* 9.8*   HEMATOCRIT % 25.2* 24.3* 24.6* 22.9* 24.1*  --  21.6* 22.0* 21.0* 21.4*  --  32.3* 31.6*   PLATELETS Thousands/uL 211 215  --  201 207  --  190  --  158 169  --  206 192   POTASSIUM mmol/L 4.5 4.1  --  4.8 4.6  --  5.2  --   --  4.7 4.1 5.8* 5.8*   CHLORIDE mmol/L 95* 95*  --  94* 94*  --  99  --   --  98 98 106 105   CO2 mmol/L 26 26  --  24 26  --  22  --   --  23 22 16* 19*   BUN mg/dL 38* 25  --  40* 30*  --  49*  --   --  45* 35* 94* 95*   CREATININE mg/dL 6.44* 4.93*  --  6.92* 5.25*  --  7.55*  --   --  7.12* 5.20* 11.08* 11.13*   CALCIUM mg/dL 7.6* 7.7*  --  7.7* 8.4  --  7.5*  --   --  7.5* 8.0* 8.3* 8.1*   MAGNESIUM mg/dL  --   --   --   --  2.0  --  2.1  --   --   --  1.9  --  2.3   PHOSPHORUS mg/dL  --   --   --   --  5.4*  --   --   --   --   --   --   --  7.7*   ALBUMIN g/dL  --   --   --   --   --   --   --   --   --   --   --  4.2 4.0       Administrative Statements     Portions of the record may have been created with voice recognition software. Occasional wrong word or \"sound a like\" substitutions may have occurred due to the inherent limitations of voice recognition software. Read the chart carefully and recognize, using context, where substitutions have occurred.If you have any questions, please contact the dictating provider.  "

## 2024-10-14 NOTE — ASSESSMENT & PLAN NOTE
Lab Results   Component Value Date    HGBA1C 5.5 10/08/2024       Recent Labs     10/13/24  2048 10/14/24  0736 10/14/24  1115 10/14/24  1602   POCGLU 125 112 94 132   [facility]  Diabetic diet

## 2024-10-14 NOTE — PLAN OF CARE
Educated patient:  adequate nutrition and hydration as well how it encourages wound healing.  Clean and Change wound dressing daily. Maintaining a dry dressing.  Safety environment ways to prevent falls by having call bell in place, bed at lowest setting, no slip socks on

## 2024-10-14 NOTE — ASSESSMENT & PLAN NOTE
CT head, CT c-spine, and XR R knee negative for acute abnormality on admission  PT/OT recommending minimal resources on discharge  Fall precautions

## 2024-10-14 NOTE — PLAN OF CARE
Problem: Potential for Falls  Goal: Patient will remain free of falls  Description: INTERVENTIONS:  - Educate patient/family on patient safety including physical limitations  - Instruct patient to call for assistance with activity   - Consult OT/PT to assist with strengthening/mobility   - Keep Call bell within reach  - Keep bed low and locked with side rails adjusted as appropriate  - Keep care items and personal belongings within reach  - Initiate and maintain comfort rounds  - Make Fall Risk Sign visible to staff  - Offer Toileting every 2 Hours, in advance of need  - Initiate/Maintain bed alarm  - Obtain necessary fall risk management equipment: fall risk sign on door  - Apply yellow socks and bracelet for high fall risk patients  - Consider moving patient to room near nurses station  Outcome: Progressing     Problem: METABOLIC, FLUID AND ELECTROLYTES - ADULT  Goal: Electrolytes maintained within normal limits  Description: INTERVENTIONS:  - Monitor labs and assess patient for signs and symptoms of electrolyte imbalances  - Administer electrolyte replacement as ordered  - Monitor response to electrolyte replacements, including repeat lab results as appropriate  - Instruct patient on fluid and nutrition as appropriate  Outcome: Progressing  Goal: Fluid balance maintained  Description: INTERVENTIONS:  - Monitor labs   - Monitor I/O and WT  - Instruct patient on fluid and nutrition as appropriate  - Assess for signs & symptoms of volume excess or deficit  Outcome: Progressing     Problem: NEUROSENSORY - ADULT  Goal: Achieves stable or improved neurological status  Description: INTERVENTIONS  - Monitor and report changes in neurological status  - Monitor vital signs such as temperature, blood pressure, glucose, and any other labs ordered       Outcome: Progressing  Goal: Achieves maximal functionality and self care  Description: INTERVENTIONS  - Monitor swallowing and airway patency with patient fatigue and  changes in neurological status  - Encourage and assist patient to increase activity and self care.   - Encourage visually impaired, hearing impaired and aphasic patients to use assistive/communication devices  Outcome: Progressing

## 2024-10-14 NOTE — ASSESSMENT & PLAN NOTE
Final Anesthesia Post-op Assessment    Patient: Apollo Hernandes  Procedure(s) Performed: RIGHT KNEE ARTHROSCOPICALLY ASSISTED REVISION ACL RECONSTRUCTION WITH QUADRICEPS AUTOGRAFT, PARTIAL MEDIAL MENISECTOMY AND HARDWARE REMOVAL - RIGHT  Anesthesia type: General    Vitals Value Taken Time   Temp 36.6 °C (97.9 °F) 08/15/22 1642   Pulse 87 08/15/22 1800   Resp 15 08/15/22 1800   SpO2 95 % 08/15/22 1800   /71 08/15/22 1800         Patient Location: Phase II  Post-op Vital Signs:stable  Level of Consciousness: participates in exam, awake, oriented, answers questions appropriately and alert  Respiratory Status: spontaneous ventilation  Cardiovascular blood pressure returned to baseline and stable  Hydration: euvolemic  Pain Management: adequately controlled  Nausea: None  Airway Patency:patent  Post-op Assessment: awake, alert, appropriately conversant, or baseline, no complications, patient tolerated procedure well with no complications and no evidence of recall      No complications documented.    Lab Results   Component Value Date    HGBA1C 5.5 10/08/2024       Recent Labs     10/13/24  1112 10/13/24  1609 10/13/24  2048 10/14/24  0736   POCGLU 114 116 125 112       Blood Sugar Average: Last 72 hrs:  (P) [id number]    HbA1c 5.5  Advised to maintain a good DM control   Maintain healthy diet (vegetables, fruits, whole grains, nonfat or low fat)  Weight loss  Physical activity (5 to 10 minutes to start the increase to 30 min a day)

## 2024-10-14 NOTE — ASSESSMENT & PLAN NOTE
Current hemoglobin: 8.1 mg/dL  Multifactorial secondary to ESRD and hematuria  Treatment:  Transfuse for hemoglobin less than 7.0 per primary service    If patient requires blood transfusion we will try to coordinate during HD unless there is an urgent indication

## 2024-10-14 NOTE — PROGRESS NOTES
"Progress Note - Urology      Patient: Naresh Carpio   : 1960 Sex: male   MRN: [id number]     CSN: [id number]  Unit/Bed#: [address] [place] [address]     SUBJECTIVE:   Patient seen on  rounds  Creatinine trending down dialysis   Blood at meatus only after Arguelles removed 1 and likely trauma      Objective   Vitals: /65   Pulse 69   Temp 97.9 °F (36.6 °C)   Resp 15   Ht 6' 1\" (1.854 m)   Wt 97.2 kg (214 lb 4.6 oz)   SpO2 97%   BMI 28.27 kg/m²     I/O last 24 hours:  In: 720 [P.O.:700; I.V.:20]  Out: 1000 [Urine:1000]      Physical Exam:   General Alert awake   Normocephalic atraumatic PERRLA  Lungs clear bilaterally  Cardiac normal S1 normal S2  Abdomen soft, flank pain  Extremities no edema      Lab Results: CBC:   Lab Results   Component Value Date    WBC 6.19 10/14/2024    HGB 8.1 (L) 10/14/2024    HCT 25.2 (L) 10/14/2024    MCV 92 10/14/2024     10/14/2024    RBC 2.73 (L) 10/14/2024    MCH 29.7 10/14/2024    MCHC 32.1 10/14/2024    RDW 16.0 (H) 10/14/2024    MPV 9.8 10/14/2024    NRBC 0 10/11/2024     CMP:   Lab Results   Component Value Date    CL 95 (L) 10/14/2024    CO2 26 10/14/2024    BUN 38 (H) 10/14/2024    CREATININE 6.44 (H) 10/14/2024    CALCIUM 7.6 (L) 10/14/2024    AST 25 10/08/2024    ALT 39 10/08/2024    ALKPHOS 100 10/08/2024    EGFR 8 10/14/2024     Urinalysis:   Lab Results   Component Value Date    COLORU Yellow 2023    CLARITYU Clear 2023    SPECGRAV 1.010 2023    PHUR 7.0 2023    LEUKOCYTESUR Negative 2023    NITRITE Negative 2023    GLUCOSEU 100 (1/10%) (A) 2023    KETONESU Negative 2023    BILIRUBINUR Negative 2023    BLOODU Small (A) 2023     Urine Culture: No results found for: \"URINECX\"  PSA: No results found for: \"PSA\"      Assessment/ Plan:  Chronic renal failure  Scant urine output  Blood noted at meatus urine or the likely related to Arguelles trauma  Discussed with patient again to come to office for flex cystoscopy " to confirm          Pete Rivera MD

## 2024-10-14 NOTE — PROGRESS NOTES
Patient examined spoke to the nurse nephrology and urology notes reviewed and noted.  Patient is eating his lunch happy to see me he is pleasant cooperative complaining of feeling weak and tired he is fully aware of his ongoing problems with high creatinine and low hemoglobin.  Medical evaluation and treatment is in progress.  Patient is reminded not to miss his dialysis and take care of his medical care needs after the discharge he agreed.  Patient also reports that'' my neuropathy bothers me, I cannot sleep well, I am taking Neurontin nurse reports no behavioral problem patient is able to express his feelings well''He is depressed and  disappointed about ongoing his medical problems.  Patient is fully aware of his ongoing problems and he agreed to comply with his needed medical care after the discharge.  No psychosis no hallucination no agitation.  Patient denies feeling suicidal he gets anxious and he is depressed.  No side effects of Lexapro noted.  Therapy is done with good response.  I will follow-up.

## 2024-10-14 NOTE — ASSESSMENT & PLAN NOTE
Lab Results   Component Value Date    EGFR 8 10/14/2024    EGFR 11 10/13/2024    EGFR 7 10/12/2024    CREATININE 6.44 (H) 10/14/2024    CREATININE 4.93 (H) 10/13/2024    CREATININE 6.92 (H) 10/12/2024   #ESRD on HD TTS:  Dialysis unit/days:[facility]   Access: PermCath  Had dialysis Saturday, well-tolerated UF 2 L  Plan for next dialysis tomorrow, Tuesday  Renal Diet  Fluid restriction 1.5L/d  Adjust medications to GFR<10  Avoid opioids

## 2024-10-14 NOTE — PROGRESS NOTES
Progress Note - Hospitalist   Name: Naresh Carpio 64 y.o. male I MRN: [id number]  Unit/Bed#: 4 [address] I Date of Admission: 10/8/2024   Date of Service: 10/14/2024 I Hospital Day: 5    Assessment & Plan  Toxic metabolic encephalopathy  RR called 10/8 for acute change in mental status with last well known 12:58, stroke alert called 1:16 pm  NIH 8 and decision was made to give TNK after initial CT head negative for bleed  CTA head/neck showed intracranial ICA atherosclerosis - outpatient vascular surgery referral  MRI brain without evidence of acute CVA  Suspect metabolic encephalopathy in setting of ESRD with missed dialysis  Patient back at baseline mentation  Continue to monitor  ESRD (end stage renal disease) (Former[facility])  Lab Results   Component Value Date    EGFR 8 10/14/2024    EGFR 11 10/13/2024    EGFR 7 10/12/2024    CREATININE 6.44 (H) 10/14/2024    CREATININE 4.93 (H) 10/13/2024    CREATININE 6.92 (H) 10/12/2024     Patient gets dialysis Tuesday, Thursday, Saturday and has missed dialysis for about a week since being discharged from short-term rehab.  Patient was scheduled for dialysis at a different facility but family stated that they cannot transport him for his appointments  Nephrology following  Continued on TTS schedule  Continue Renagel for hyperphosphatemia  Hematuria  Patient initially had gaitan placed for urinary retention, requested it be removed  Noted to have hematuria after gaitan removal with clots  Urinary retention protocol  Aspirin and DVT prophylaxis currently on held  Urology consulted  Patient notes clear urine today  If ongoing hematuria, would need outpatient follow up for possible cystoscopy  Acute on chronic anemia  Hemoglobin 10.2 on admission, noted to drop to 6.7 after TNK administration  Patient initially declined CT a/p and blood transfusion, eventually agreeable to 1 unit PRBC on 10/10  Urology following for hematuria, urine now clear  Denies black/bloody bowel  movements  Aspirin and DVT prophylaxis currently on hold  Hemoglobin 8.1 this morning  Patient reports he still would like to hold off CT a/p to evaluate for source of bleeding  Check iron panel, B12, folate  Repeat cbc in am  Type 2 diabetes mellitus, without long-term current use of insulin ([facility])  Lab Results   Component Value Date    HGBA1C 5.5 10/08/2024       Recent Labs     10/13/24  2048 10/14/24  0736 10/14/24  1115 10/14/24  1602   POCGLU 125 112 94 132   SSI  Diabetic diet  Status post fall  CT head, CT c-spine, and XR R knee negative for acute abnormality on admission  PT/OT recommending minimal resources on discharge  Fall precautions  PVD (peripheral vascular disease) ([facility])  Continue statin  Hypertension  Continue nifedipine 60 mg p.o. 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)  Lab Results   Component Value Date    EGFR 8 10/14/2024    EGFR 11 10/13/2024    EGFR 7 10/12/2024    CREATININE 6.44 (H) 10/14/2024    CREATININE 4.93 (H) 10/13/2024    CREATININE 6.92 (H) 10/12/2024   Patient is due for dialysis in a.m. as per discussion with nephrology    VTE Pharmacologic Prophylaxis: VTE Score: 2 Low Risk (Score 0-2) - Encourage Ambulation.    Mobility:   Basic Mobility Inpatient Raw Score: 21  JH-HLM Goal: 6: Walk 10 steps or more  JH-HLM Achieved: 6: Walk 10 steps or more  JH-HLM Goal achieved. Continue to encourage appropriate mobility.    Patient Centered Rounds: I performed bedside rounds with nursing staff today.   Discussions with Specialists or Other Care Team Provider: multidisciplinary team     Education and Discussions with Family / Patient:  patient updated sister via phone .     Current Length of Stay: 5 day(s)  Current Patient Status: Inpatient   Certification Statement: The patient will continue to require additional inpatient hospital stay due to repeat labs and dialysis in am   Discharge Plan: Anticipate  discharge tomorrow to home.    Code Status: Level 1 - Full Code    Subjective     Patient seen sitting up in bed watching TV, had just had breakfast.  Reports that he is voiding intermittently, no further hematuria.  Is due for dialysis in AM.  Slept okay, no nausea or vomiting.  Denies any fevers or chills.    Objective :  Temp:  [97.9 °F (36.6 °C)-98.7 °F (37.1 °C)] 98.4 °F (36.9 °C)  HR:  [65-72] 68  BP: (159-164)/(63-67) 162/67  Resp:  [14-18] 18  SpO2:  [93 %-98 %] 98 %  O2 Device: None (Room air)    Body mass index is 28.27 kg/m².     Input and Output Summary (last 24 hours):     Intake/Output Summary (Last 24 hours) at 10/14/2024 1752  Last data filed at 10/14/2024 1201  Gross per 24 hour   Intake 410 ml   Output 1250 ml   Net -840 ml       Physical Exam  Vitals and nursing note reviewed.   Constitutional:       General: He is not in acute distress.  HENT:      Head: Normocephalic.      Nose: Nose normal.      Mouth/Throat:      Mouth: Mucous membranes are moist.   Eyes:      Extraocular Movements: Extraocular movements intact.      Conjunctiva/sclera: Conjunctivae normal.      Pupils: Pupils are equal, round, and reactive to light.   Cardiovascular:      Rate and Rhythm: Normal rate.      Pulses: Normal pulses.   Pulmonary:      Effort: Pulmonary effort is normal.      Breath sounds: Normal breath sounds.   Abdominal:      General: Bowel sounds are normal. There is no distension.      Palpations: Abdomen is soft.      Tenderness: There is no abdominal tenderness.   Genitourinary:     Comments: Voids intermittently; reports clear yellow urine   Musculoskeletal:      Cervical back: Normal range of motion.      Right lower leg: Edema present.      Left lower leg: Edema present.   Skin:     Capillary Refill: Capillary refill takes less than 2 seconds.      Comments: Venous stasis discoloration ; wounds noted on right LE with erythema; patient reports improving    Neurological:      General: No focal deficit  present.      Mental Status: He is alert and oriented to person, place, and time.   Psychiatric:         Mood and Affect: Mood normal.         Behavior: Behavior normal.         Thought Content: Thought content normal.         Judgment: Judgment normal.           Lines/Drains:  Lines/Drains/Airways       Active Status       Name Placement date Placement time Site Days    HD Permanent Double Catheter --  --  Internal jugular  --                            Lab Results: I have reviewed the following results:   Results from last 7 days   Lab Units 10/14/24  0614 10/12/24  0435 10/11/24  0510   WBC Thousand/uL 6.19   < > 6.51   HEMOGLOBIN g/dL 8.1*   < > 7.9*   HEMATOCRIT % 25.2*   < > 24.1*   PLATELETS Thousands/uL 211   < > 207   SEGS PCT %  --   --  74   LYMPHO PCT %  --   --  12*   MONO PCT %  --   --  10   EOS PCT %  --   --  4    < > = values in this interval not displayed.     Results from last 7 days   Lab Units 10/14/24  0614 10/08/24  1407 10/08/24  0454   SODIUM mmol/L 131*   < > 138   POTASSIUM mmol/L 4.5   < > 5.8*   CHLORIDE mmol/L 95*   < > 106   CO2 mmol/L 26   < > 16*   BUN mg/dL 38*   < > 94*   CREATININE mg/dL 6.44*   < > 11.08*   ANION GAP mmol/L 10   < > 16*   CALCIUM mg/dL 7.6*   < > 8.3*   ALBUMIN g/dL  --   --  4.2   TOTAL BILIRUBIN mg/dL  --   --  0.52   ALK PHOS U/L  --   --  100   ALT U/L  --   --  39   AST U/L  --   --  25   GLUCOSE RANDOM mg/dL 110   < > 139    < > = values in this interval not displayed.         Results from last 7 days   Lab Units 10/14/24  1602 10/14/24  1115 10/14/24  0736 10/13/24  2048 10/13/24  1609 10/13/24  1112 10/13/24  0728 10/12/24  2043 10/12/24  1652 10/12/24  1120 10/12/24  0724 10/11/24  2035   POC GLUCOSE mg/dl 132 94 112 125 116 114 93 106 124 91 98 118     Results from last 7 days   Lab Units 10/08/24  0839   HEMOGLOBIN A1C % 5.5           Recent Cultures (last 7 days):         Imaging Results Review: No pertinent imaging studies reviewed.  Other Study  Results Review: No additional pertinent studies reviewed.    Last 24 Hours Medication List:     Current Facility-Administered Medications:     acetaminophen (Ofirmev) injection 1,000 mg, Q6H PRN, Last Rate: Stopped (10/12/24 1010)    allopurinol (ZYLOPRIM) tablet 100 mg, Daily    atorvastatin (LIPITOR) tablet 40 mg, Daily With Dinner    docusate sodium (COLACE) capsule 100 mg, BID    escitalopram (LEXAPRO) tablet 10 mg, Daily    gabapentin (NEURONTIN) capsule 300 mg, BID    hydrALAZINE (APRESOLINE) injection 10 mg, Q6H PRN    influenza vaccine, high-dose (Fluzone High-Dose) IM injection 0.5 mL, Once PRN    insulin lispro (HumALOG/ADMELOG) 100 units/mL subcutaneous injection 1-6 Units, TID AC **AND** Fingerstick Glucose (POCT), TID AC    labetalol (NORMODYNE) injection 10 mg, Q4H PRN    NIFEdipine (PROCARDIA XL) 24 hr tablet 60 mg, After Dinner    ondansetron (ZOFRAN) injection 4 mg, Q6H PRN    pantoprazole (PROTONIX) injection 40 mg, Q12H KEMAL    polyethylene glycol (MIRALAX) packet 17 g, Daily PRN    sodium hypochlorite (DAKIN'S HALF-STRENGTH) 0.25 percent topical solution 1 Application, Daily    Administrative Statements   Today, Patient Was Seen By: GRANT Valdivia  I have spent a total time of greater than 45 minutes in caring for this patient on the day of the visit/encounter including Diagnostic results, Counseling / Coordination of care, Documenting in the medical record, Reviewing / ordering tests, medicine, procedures  , and Communicating with other healthcare professionals .    **Please Note: This note may have been constructed using a voice recognition system.**

## 2024-10-14 NOTE — ASSESSMENT & PLAN NOTE
Volume: Hypervolemic  Blood pressure: Hypertensive, /63  Recommend low-sodium diet  Fluid restriction as above  Increase nifedipine 30 mg daily

## 2024-10-15 ENCOUNTER — APPOINTMENT (INPATIENT)
Dept: DIALYSIS | Facility: HOSPITAL | Age: 64
DRG: 640 | End: 2024-10-15
Attending: FAMILY MEDICINE
Payer: MEDICARE

## 2024-10-15 LAB
ANION GAP SERPL CALCULATED.3IONS-SCNC: 9 MMOL/L (ref 4–13)
BUN SERPL-MCNC: 49 MG/DL (ref 5–25)
CALCIUM SERPL-MCNC: 7.7 MG/DL (ref 8.4–10.2)
CHLORIDE SERPL-SCNC: 99 MMOL/L (ref 96–108)
CO2 SERPL-SCNC: 27 MMOL/L (ref 21–32)
CREAT SERPL-MCNC: 7.85 MG/DL (ref 0.6–1.3)
ERYTHROCYTE [DISTWIDTH] IN BLOOD BY AUTOMATED COUNT: 15.9 % (ref 11.6–15.1)
GFR SERPL CREATININE-BSD FRML MDRD: 6 ML/MIN/1.73SQ M
GLUCOSE SERPL-MCNC: 107 MG/DL (ref 65–140)
GLUCOSE SERPL-MCNC: 121 MG/DL (ref 65–140)
GLUCOSE SERPL-MCNC: 123 MG/DL (ref 65–140)
GLUCOSE SERPL-MCNC: 92 MG/DL (ref 65–140)
GLUCOSE SERPL-MCNC: 94 MG/DL (ref 65–140)
HCT VFR BLD AUTO: 23.7 % (ref 36.5–49.3)
HGB BLD-MCNC: 7.6 G/DL (ref 12–17)
MAGNESIUM SERPL-MCNC: 1.9 MG/DL (ref 1.9–2.7)
MCH RBC QN AUTO: 29.5 PG (ref 26.8–34.3)
MCHC RBC AUTO-ENTMCNC: 32.1 G/DL (ref 31.4–37.4)
MCV RBC AUTO: 92 FL (ref 82–98)
PLATELET # BLD AUTO: 213 THOUSANDS/UL (ref 149–390)
PMV BLD AUTO: 9.6 FL (ref 8.9–12.7)
POTASSIUM SERPL-SCNC: 4.3 MMOL/L (ref 3.5–5.3)
RBC # BLD AUTO: 2.58 MILLION/UL (ref 3.88–5.62)
SODIUM SERPL-SCNC: 135 MMOL/L (ref 135–147)
WBC # BLD AUTO: 6.01 THOUSAND/UL (ref 4.31–10.16)

## 2024-10-15 PROCEDURE — 99232 SBSQ HOSP IP/OBS MODERATE 35: CPT | Performed by: STUDENT IN AN ORGANIZED HEALTH CARE EDUCATION/TRAINING PROGRAM

## 2024-10-15 PROCEDURE — 82948 REAGENT STRIP/BLOOD GLUCOSE: CPT

## 2024-10-15 PROCEDURE — 99232 SBSQ HOSP IP/OBS MODERATE 35: CPT | Performed by: NURSE PRACTITIONER

## 2024-10-15 PROCEDURE — 80048 BASIC METABOLIC PNL TOTAL CA: CPT | Performed by: NURSE PRACTITIONER

## 2024-10-15 PROCEDURE — 97110 THERAPEUTIC EXERCISES: CPT

## 2024-10-15 PROCEDURE — 83735 ASSAY OF MAGNESIUM: CPT | Performed by: NURSE PRACTITIONER

## 2024-10-15 PROCEDURE — 85027 COMPLETE CBC AUTOMATED: CPT | Performed by: NURSE PRACTITIONER

## 2024-10-15 RX ADMIN — EPOETIN ALFA 3000 UNITS: 3000 SOLUTION INTRAVENOUS; SUBCUTANEOUS at 10:45

## 2024-10-15 RX ADMIN — PANTOPRAZOLE SODIUM 40 MG: 40 INJECTION, POWDER, FOR SOLUTION INTRAVENOUS at 12:40

## 2024-10-15 RX ADMIN — Medication 2.5 MG: at 22:11

## 2024-10-15 RX ADMIN — ACETAMINOPHEN 1000 MG: 10 INJECTION INTRAVENOUS at 21:22

## 2024-10-15 RX ADMIN — PANTOPRAZOLE SODIUM 40 MG: 40 INJECTION, POWDER, FOR SOLUTION INTRAVENOUS at 20:20

## 2024-10-15 RX ADMIN — GABAPENTIN 300 MG: 300 CAPSULE ORAL at 17:14

## 2024-10-15 RX ADMIN — GABAPENTIN 300 MG: 300 CAPSULE ORAL at 11:45

## 2024-10-15 RX ADMIN — HYOSCYAMINE SULFATE 1 APPLICATION: 16 SOLUTION at 13:51

## 2024-10-15 RX ADMIN — ESCITALOPRAM OXALATE 10 MG: 10 TABLET ORAL at 12:41

## 2024-10-15 RX ADMIN — ALLOPURINOL 100 MG: 100 TABLET ORAL at 12:40

## 2024-10-15 RX ADMIN — ATORVASTATIN CALCIUM 40 MG: 40 TABLET, FILM COATED ORAL at 16:24

## 2024-10-15 RX ADMIN — NIFEDIPINE 60 MG: 30 TABLET, EXTENDED RELEASE ORAL at 17:14

## 2024-10-15 NOTE — PHYSICAL THERAPY NOTE
PT TREATMENT     10/15/24 3474   PT Last Visit   PT Visit Date 10/15/24   Pain Assessment   Pain Assessment Tool Farr-Baker FACES   Farr-Baker FACES Pain Rating 0   Restrictions/Precautions   Other Precautions Chair Alarm;Bed Alarm;Fall Risk;Contact/isolation   General   Family/Caregiver Present No   Subjective   Subjective patient concerned with weakness as related to stair climbing with no assist at home to enter   Bed Mobility   Supine to Sit 7  Independent   Additional Comments patient sitting on bed edge at end of session   Transfers   Sit to Stand 5  Supervision   Stand to Sit 5  Supervision   Ambulation/Elevation   Gait Assistance 5  Supervision   Additional items Verbal cues   Assistive Device Rolling walker   Distance 20 feet with shortened stride length and head down positioning.   Stair Management Assistance 3  Moderate assist   Additional items Assist x 1;Verbal cues;Tactile cues   Stair Management Technique One rail L;Step to pattern  (with hand hold assist on R UE for simulation of cane useage. Patient required min assist for stair climbing with two rails but only has one rail on the Left at home.)   Number of Stairs 3  (patient returned to sitting on bed edge with frustration due to inability to ascend stairs independently)   Balance   Static Sitting Good   Dynamic Sitting Good   Static Standing Fair +   Dynamic Standing Fair   Ambulatory Fair -   Activity Tolerance   Activity Tolerance Patient limited by fatigue   Nurse Made Aware yes   Assessment   Assessment patient  cooperative although frustrated with weakness on stair climbing. Patient requiring increased assist for 2-3 stair ascention this session and has 8 stairs to enter his home. Patient will benefit from continued PT with progression as tolerated and increasing functional mobility with clinical staff as well. The patient's AM-[facility] Basic Mobility Inpatient Short Form Raw Score is 21. A Raw score of greater than 16 suggests the patient may  benefit from discharge to home although patient without assist and unable to ascend stairs. Please also refer to the recommendation of the Physical Therapist for safe discharge planning.         Plan   Treatment/Interventions ADL retraining;Functional transfer training;LE strengthening/ROM;Elevations;Therapeutic exercise;Endurance training;Patient/family training;Equipment eval/education;Gait training;Compensatory technique education   PT Frequency Other (Comment)  (5x/week)   Discharge Recommendation   Rehab Resource Intensity Level, PT II (Moderate Resource Intensity)   Additional Comments emphasis on stair climbing needed   AM-PAC Basic Mobility Inpatient   Turning in Flat Bed Without Bedrails 4   Lying on Back to Sitting on Edge of Flat Bed Without Bedrails 4   Moving Bed to Chair 4   Standing Up From Chair Using Arms 4   Walk in Room 3   Climb 3-5 Stairs With Railing 2   Basic Mobility Inpatient Raw Score 21   Basic Mobility Standardized Score 45.55   [facility] Highest Level Of Mobility   -HLM Goal 6: Walk 10 steps or more   JH-HLM Achieved 6: Walk 10 steps or more   Exercises   Hip Flexion Sitting;5 reps;Bilateral   Knee AROM Long Arc Quad Sitting;5 reps;Bilateral   Ankle Pumps Sitting;5 reps;Bilateral   Marching Standing;5 reps;Bilateral   Balance training  sidestepping completed for balance and coordination   Licensure   [place] License Number  Anat Lal PT [id number]

## 2024-10-15 NOTE — ASSESSMENT & PLAN NOTE
Lab Results   Component Value Date    EGFR 6 10/15/2024    EGFR 8 10/14/2024    EGFR 11 10/13/2024    CREATININE 7.85 (H) 10/15/2024    CREATININE 6.44 (H) 10/14/2024    CREATININE 4.93 (H) 10/13/2024     Patient gets dialysis Tuesday, Thursday, Saturday and has missed dialysis for about a week since being discharged from short-term rehab.  Patient was scheduled for dialysis at a different facility but family stated that they cannot transport him for his appointments  Nephrology following  Continued on TTS schedule  Continue Renagel for hyperphosphatemia

## 2024-10-15 NOTE — PROGRESS NOTES
"Progress Note - Urology      Patient: Naresh Carpio   : 1960 Sex: male   MRN: [id number]     CSN: [id number]  Unit/Bed#: [address] [place] [address]     SUBJECTIVE:   Patient seen on  rounds  Creatinine trending down dialysis   Blood at meatus only after Arguelles removed 1 and likely trauma  Urine clear today    Objective   Vitals: /55   Pulse 71   Temp 98.1 °F (36.7 °C)   Resp 18   Ht 6' 1\" (1.854 m)   Wt 97.6 kg (215 lb 1.6 oz)   SpO2 96%   BMI 28.38 kg/m²     I/O last 24 hours:  In: 500 [I.V.:500]  Out: 2267 [Urine:800; Other:1467]      Physical Exam:   General Alert awake   Normocephalic atraumatic PERRLA  Lungs clear bilaterally  Cardiac normal S1 normal S2  Abdomen soft, flank pain  Extremities no edema      Lab Results: CBC:   Lab Results   Component Value Date    WBC 6.01 10/15/2024    HGB 7.6 (L) 10/15/2024    HCT 23.7 (L) 10/15/2024    MCV 92 10/15/2024     10/15/2024    RBC 2.58 (L) 10/15/2024    MCH 29.5 10/15/2024    MCHC 32.1 10/15/2024    RDW 15.9 (H) 10/15/2024    MPV 9.6 10/15/2024    NRBC 0 10/11/2024     CMP:   Lab Results   Component Value Date    CL 99 10/15/2024    CO2 27 10/15/2024    BUN 49 (H) 10/15/2024    CREATININE 7.85 (H) 10/15/2024    CALCIUM 7.7 (L) 10/15/2024    AST 25 10/08/2024    ALT 39 10/08/2024    ALKPHOS 100 10/08/2024    EGFR 6 10/15/2024     Urinalysis:   Lab Results   Component Value Date    COLORU Yellow 2023    CLARITYU Clear 2023    SPECGRAV 1.010 2023    PHUR 7.0 2023    LEUKOCYTESUR Negative 2023    NITRITE Negative 2023    GLUCOSEU 100 (1/10%) (A) 2023    KETONESU Negative 2023    BILIRUBINUR Negative 2023    BLOODU Small (A) 2023     Urine Culture: No results found for: \"URINECX\"  PSA: No results found for: \"PSA\"      Assessment/ Plan:  Chronic renal failure  Scant urine output clear today  Blood noted at meatus urine or the likely related to Arguelles trauma  Discussed with patient again to come to " office for flex cystoscopy to confirm          Pete Rivera MD

## 2024-10-15 NOTE — ASSESSMENT & PLAN NOTE
CT head, CT c-spine, and XR R knee negative for acute abnormality on admission  PT/OT saw patient again on 10/15 as patient was seen concerns regarding ability to climb 8 steps into the home.  PT indicated that patient would have difficulty navigating stairs due to deconditioning, recommending short-term rehab  Discussed with case management  Fall precautions

## 2024-10-15 NOTE — ASSESSMENT & PLAN NOTE
Lab Results   Component Value Date    HGBA1C 5.5 10/08/2024       Recent Labs     10/14/24  1115 10/14/24  1602 10/14/24  2012 10/15/24  0727   POCGLU 94 132 146* 107       Blood Sugar Average: Last 72 hrs:  (P) [id number]    HbA1c 5.5  Recommend good diabetes control   Continue with insulin  Maintain healthy diet (vegetables, fruits, whole grains, nonfat or low fat)  Weight loss  Physical activity (5 to 10 minutes to start the increase to 30 min a day)

## 2024-10-15 NOTE — PROGRESS NOTES
Patient examined spoke to the nurse nephrology progress notes reviewed and noted patient seem to be in a good mood today case workers notes reviewed and noted.  Patient reports that he feels a little better with the tiredness and he feels'' pretty good'' medical treatment is in progress.  He has a regular dialysis.  His hemoglobin seems to be better 8.1.  He offers no new complaints.  No behavioral problems reported or noted.  He is pleasant and looks upbeat today he wants to go home once he is medically stable.  Patient is reminded to comply with his medical care including regular dialysis after the discharge he agreed stating that he will not make mistake anymore and he will not miss the dialysis because that made him sick.  Tolerating medication Lexapro well.  Patient has multiple medical problems including end-stage renal disease and on dialysis.  No psychosis no hallucination no agitation not confused able to communicate his feelings well and offers no new complaint today he does not look in any distress and no complaints at this time.  Patient is depressed but denies feeling suicidal.  Therapy is done with good response.  I will follow-up.

## 2024-10-15 NOTE — ASSESSMENT & PLAN NOTE
Lab Results   Component Value Date    EGFR 6 10/15/2024    EGFR 8 10/14/2024    EGFR 11 10/13/2024    CREATININE 7.85 (H) 10/15/2024    CREATININE 6.44 (H) 10/14/2024    CREATININE 4.93 (H) 10/13/2024   Patient underwent dialysis on 10/15, continue TTS schedule

## 2024-10-15 NOTE — PLAN OF CARE
Target UF Goal  1-1.5  L as tolerated. Patient dialyzing for 4 hours on 3 K bath for serum K of  4.5 from 10/14/24  per protocol. Treatment plan reviewed with Dr. Reyes.   Problem: METABOLIC, FLUID AND ELECTROLYTES - ADULT  Goal: Electrolytes maintained within normal limits  Description: INTERVENTIONS:  - Monitor labs and assess patient for signs and symptoms of electrolyte imbalances  - Administer electrolyte replacement as ordered  - Monitor response to electrolyte replacements, including repeat lab results as appropriate  - Instruct patient on fluid and nutrition as appropriate  Outcome: Progressing  Goal: Fluid balance maintained  Description: INTERVENTIONS:  - Monitor labs   - Monitor I/O and WT  - Instruct patient on fluid and nutrition as appropriate  - Assess for signs & symptoms of volume excess or deficit  Outcome: Progressing   Post-Dialysis RN Treatment Note    Blood Pressure:  Pre 155/75 mm/Hg  Post 154/64 mmHg   EDW  108 kg    Weight:  Pre 95.7 kg   Post 94.7 kg   Mode of weight measurement: Bed Scale   Volume Removed 967 ml    Treatment duration 231 minutes    NS given  No    Treatment shortened? Yes, describe: 9 minutes off early as per patient request due to leg neuropathy pain as claim by pt.   Medications given during Rx Epogen 3,000 unit, gabapentin   Estimated Kt/V  None Reported   Access type: Permacath/TDC   Access Issues: No    Report called to primary nurse   Yes Janna Isidro RN

## 2024-10-15 NOTE — ASSESSMENT & PLAN NOTE
Hemoglobin 10.2 on admission, noted to drop to 6.7 after TNK administration  Patient initially declined CT a/p and blood transfusion, eventually agreeable to 1 unit PRBC on 10/10  Urology following for hematuria, urine now clear  Denies black/bloody bowel movements  Aspirin and DVT prophylaxis currently on hold  Hemoglobin 7.6 this morning  Patient reports he still would like to hold off CT a/p to evaluate for source of bleeding  Check iron panel, B12, folate; iron 72, TIBC low, B12 and folate within normal limits  Repeat cbc in am

## 2024-10-15 NOTE — PROGRESS NOTES
Progress Note - Nephrology   Name: Naresh Carpio 64 y.o. male I MRN: [id number]  Unit/Bed#: 4 [place] 414-01 I Date of Admission: 10/8/2024   Date of Service: 10/15/2024 I Hospital Day: 6     Assessment & Plan  ESRD (end stage renal disease) ([facility])  Lab Results   Component Value Date    EGFR 8 10/14/2024    EGFR 11 10/13/2024    EGFR 7 10/12/2024    CREATININE 6.44 (H) 10/14/2024    CREATININE 4.93 (H) 10/13/2024    CREATININE 6.92 (H) 10/12/2024   ESRD on HD TTS:  Dialysis unit/days:[facility]   Access: PermCath  Due for dialysis today   Renal Diet  Fluid restriction 1.5L/d  Adjust medications to GFR<10  Avoid opioids   Patient can continue dialysis at outpatient dialysis unit on discharge        Type 2 diabetes mellitus, without long-term current use of insulin ([facility])  Lab Results   Component Value Date    HGBA1C 5.5 10/08/2024       Recent Labs     10/14/24  1115 10/14/24  1602 10/14/24  2012 10/15/24  0727   POCGLU 94 132 146* 107       Blood Sugar Average: Last 72 hrs:  (P) [id number]    HbA1c 5.5  Recommend good diabetes control   Continue with insulin  Maintain healthy diet (vegetables, fruits, whole grains, nonfat or low fat)  Weight loss  Physical activity (5 to 10 minutes to start the increase to 30 min a day)    Acute on chronic anemia  Current hemoglobin: 8.1 mg/dL  Multifactorial secondary to ESRD and hematuria  Treatment:  Plan to give Epogen 3000 units on dialysis today  Transfuse for hemoglobin less than 7.0 per primary service      Status post fall  PT OT  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, on chronic dialysis ([facility])  Lab Results   Component Value Date    EGFR 8 10/14/2024    EGFR 11 10/13/2024    EGFR 7 10/12/2024    CREATININE 6.44 (H) 10/14/2024    CREATININE 4.93 (H) 10/13/2024    CREATININE 6.92 (H) 10/12/2024   Phosphorus at goal 5.4  No indication of binders at this time  Hypertension  Volume: Fluid overload  Blood pressure:  Borderline hypertension, /63  Recommend low-sodium diet  Fluid restriction as above  Continue with nifedipine 60 mg     I have reviewed the nephrology recommendations including HD today, with primary team, and we are in agreement with renal plan including the information outlined above. I have discussed the above management plan in detail with the primary service.     Subjective   Brief History of Admission - 65 yo man with PMH of ESRD on HD p/ after a fall.  Nephrology is consulted for management of ESRD     Patient does not feel well today, feels tired, fatigued.  No shortness of breath    Objective :  Temp:  [98 °F (36.7 °C)-98.5 °F (36.9 °C)] 98 °F (36.7 °C)  HR:  [68-72] 68  BP: (153-162)/(53-67) 153/53  Resp:  [14-19] 19  SpO2:  [95 %-98 %] 95 %  O2 Device: None (Room air)    Current Weight: Weight - Scale: 97.6 kg (215 lb 1.6 oz)  First Weight: Weight - Scale: 112 kg (245 lb 13 oz)  I/O         10/13 0701  10/14 0700 10/14 0701  10/15 0700    P.O. 700     I.V. (mL/kg) 20 (0.2)     Total Intake(mL/kg) 720 (7.4)     Urine (mL/kg/hr) 1000 (0.4) 400 (0.2)    Total Output 1000 400    Net -280 -400                Physical Exam  General:  no acute distress at this time  Skin:  No acute rash  Eyes:  No scleral icterus and noninjected  ENT:  mucous membranes moist  Neck:  no carotid bruits  Chest:  Clear to auscultation percussion, good respiratory effort, no use of accessory respiratory muscles  CVS:  Regular rate and rhythm without rub   Abdomen:  soft and nontender   Extremities: no significant lower extremity edema  Neuro:  No gross focality  Psych:  Alert , cooperative   Dialysis access: PermCath    Medications:    Current Facility-Administered Medications:     acetaminophen (Ofirmev) injection 1,000 mg, 1,000 mg, Intravenous, Q6H PRN, Lore Revankar, DO, Stopped at 10/12/24 1010    allopurinol (ZYLOPRIM) tablet 100 mg, 100 mg, Oral, Daily, Lore Revankar, DO, 100 mg at 10/14/24 0949    atorvastatin  (LIPITOR) tablet 40 mg, 40 mg, Oral, Daily With Dinner, Lore Nascimentoankar, DO, 40 mg at 10/14/24 1651    docusate sodium (COLACE) capsule 100 mg, 100 mg, Oral, BID, Lore Revankar, DO, 100 mg at 10/13/24 0805    epoetin jessica (EPOGEN,PROCRIT) injection 3,000 Units, 3,000 Units, Intravenous, Once per day on Tuesday Thursday Saturday, Joselyn Reyes Bahamonde, MD    escitalopram (LEXAPRO) tablet 10 mg, 10 mg, Oral, Daily, Lore Revankar, DO, 10 mg at 10/14/24 0949    gabapentin (NEURONTIN) capsule 300 mg, 300 mg, Oral, BID, Gennaro Zelaya, DO, 300 mg at 10/14/24 1746    hydrALAZINE (APRESOLINE) injection 10 mg, 10 mg, Intravenous, Q6H PRN, Lore Revankar, DO, 10 mg at 10/10/24 1944    influenza vaccine, high-dose (Fluzone High-Dose) IM injection 0.5 mL, 0.5 mL, Intramuscular, Once PRN, Lore Revankar, DO    insulin lispro (HumALOG/ADMELOG) 100 units/mL subcutaneous injection 1-6 Units, 1-6 Units, Subcutaneous, TID AC **AND** Fingerstick Glucose (POCT), , , TID AC, Lore Revankar, DO    labetalol (NORMODYNE) injection 10 mg, 10 mg, Intravenous, Q4H PRN, Lore Revankar, DO, 10 mg at 10/10/24 1822    NIFEdipine (PROCARDIA XL) 24 hr tablet 60 mg, 60 mg, Oral, After Dinner, Joselyn Reyes Bahamonde, MD, 60 mg at 10/14/24 1744    ondansetron (ZOFRAN) injection 4 mg, 4 mg, Intravenous, Q6H PRN, GRANT Rich, 4 mg at 10/12/24 2128    pantoprazole (PROTONIX) injection 40 mg, 40 mg, Intravenous, Q12H KEMAL, Lore Revankar, DO, 40 mg at 10/14/24 2013    polyethylene glycol (MIRALAX) packet 17 g, 17 g, Oral, Daily PRN, Lore Silver DO, 17 g at 10/10/24 1841    sodium hypochlorite (DAKIN'S HALF-STRENGTH) 0.25 percent topical solution 1 Application, 1 Application, Irrigation, Daily, Lore Silver DO, 1 Application at 10/14/24 1200      Lab Results: I have reviewed the following results:  Results from last 7 days   Lab Units 10/14/24  0614 10/13/24  0958 10/12/24  1509 10/12/24  0435 10/11/24  0510  "10/10/24  1638 10/10/24  0610 10/09/24  2004 10/09/24  1620 10/09/24  1544 10/09/24  1544 10/08/24  1407   WBC Thousand/uL 6.19 7.26  --  6.51 6.51  --  6.49  --  5.67  --  5.74  --    HEMOGLOBIN g/dL 8.1* 7.8* 7.9* 7.3* 7.9* 8.1* 6.9* 6.9* 6.7*   < > 6.9*  --    HEMATOCRIT % 25.2* 24.3* 24.6* 22.9* 24.1*  --  21.6* 22.0* 21.0*   < > 21.4*  --    PLATELETS Thousands/uL 211 215  --  201 207  --  190  --  158  --  169  --    POTASSIUM mmol/L 4.5 4.1  --  4.8 4.6  --  5.2  --   --   --  4.7 4.1   CHLORIDE mmol/L 95* 95*  --  94* 94*  --  99  --   --   --  98 98   CO2 mmol/L 26 26  --  24 26  --  22  --   --   --  23 22   BUN mg/dL 38* 25  --  40* 30*  --  49*  --   --   --  45* 35*   CREATININE mg/dL 6.44* 4.93*  --  6.92* 5.25*  --  7.55*  --   --   --  7.12* 5.20*   CALCIUM mg/dL 7.6* 7.7*  --  7.7* 8.4  --  7.5*  --   --   --  7.5* 8.0*   MAGNESIUM mg/dL  --   --   --   --  2.0  --  2.1  --   --   --   --  1.9   PHOSPHORUS mg/dL  --   --   --   --  5.4*  --   --   --   --   --   --   --     < > = values in this interval not displayed.       Administrative Statements     Portions of the record may have been created with voice recognition software. Occasional wrong word or \"sound a like\" substitutions may have occurred due to the inherent limitations of voice recognition software. Read the chart carefully and recognize, using context, where substitutions have occurred.If you have any questions, please contact the dictating provider.  "

## 2024-10-15 NOTE — PROGRESS NOTES
Progress Note - Hospitalist   Name: Naresh Carpio 64 y.o. male I MRN: [id number]  Unit/Bed#: 4 [address] I Date of Admission: 10/8/2024   Date of Service: 10/15/2024 I Hospital Day: 6    Assessment & Plan  Toxic metabolic encephalopathy  RR called 10/8 for acute change in mental status with last well known 12:58, stroke alert called 1:16 pm  NIH 8 and decision was made to give TNK after initial CT head negative for bleed  CTA head/neck showed intracranial ICA atherosclerosis - outpatient vascular surgery referral  MRI brain without evidence of acute CVA  Suspect metabolic encephalopathy in setting of ESRD with missed dialysis  Patient back at baseline mentation  Continue to monitor  ESRD (end stage renal disease) ([facility])  Lab Results   Component Value Date    EGFR 6 10/15/2024    EGFR 8 10/14/2024    EGFR 11 10/13/2024    CREATININE 7.85 (H) 10/15/2024    CREATININE 6.44 (H) 10/14/2024    CREATININE 4.93 (H) 10/13/2024     Patient gets dialysis Tuesday, Thursday, Saturday and has missed dialysis for about a week since being discharged from short-term rehab.  Patient was scheduled for dialysis at a different facility but family stated that they cannot transport him for his appointments  Nephrology following  Continued on TTS schedule  Continue Renagel for hyperphosphatemia  Hematuria  Patient initially had gaitan placed for urinary retention, requested it be removed  Noted to have hematuria after gaitan removal with clots  Urinary retention protocol  Aspirin and DVT prophylaxis currently on held  Urology consulted  Patient notes clear urine today  If ongoing hematuria, would need outpatient follow up for possible cystoscopy  Acute on chronic anemia  Hemoglobin 10.2 on admission, noted to drop to 6.7 after TNK administration  Patient initially declined CT a/p and blood transfusion, eventually agreeable to 1 unit PRBC on 10/10  Urology following for hematuria, urine now clear  Denies black/bloody bowel  movements  Aspirin and DVT prophylaxis currently on hold  Hemoglobin 7.6 this morning  Patient reports he still would like to hold off CT a/p to evaluate for source of bleeding  Check iron panel, B12, folate; iron 72, TIBC low, B12 and folate within normal limits  Repeat cbc in am  Type 2 diabetes mellitus, without long-term current use of insulin ([facility])  Lab Results   Component Value Date    HGBA1C 5.5 10/08/2024       Recent Labs     10/14/24  2012 10/15/24  0727 10/15/24  1142 10/15/24  1553   POCGLU 146* 107 92 121   SSI  Diabetic diet  Status post fall  CT head, CT c-spine, and XR R knee negative for acute abnormality on admission  PT/OT saw patient again on 10/15 as patient was seen concerns regarding ability to climb 8 steps into the home.  PT indicated that patient would have difficulty navigating stairs due to deconditioning, recommending short-term rehab  Discussed with case management  Fall precautions  PVD (peripheral vascular disease) ([facility])  Continue statin  Hypertension  Continue nifedipine 60 mg p.o. 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, on chronic dialysis ([facility])  Lab Results   Component Value Date    EGFR 6 10/15/2024    EGFR 8 10/14/2024    EGFR 11 10/13/2024    CREATININE 7.85 (H) 10/15/2024    CREATININE 6.44 (H) 10/14/2024    CREATININE 4.93 (H) 10/13/2024   Patient underwent dialysis on 10/15, continue TTS schedule    VTE Pharmacologic Prophylaxis: VTE Score: 2 Low Risk (Score 0-2) - Encourage Ambulation.    Mobility:   Basic Mobility Inpatient Raw Score: 21  JH-HLM Goal: 6: Walk 10 steps or more  JH-HLM Achieved: 6: Walk 10 steps or more  JH-HLM Goal achieved. Continue to encourage appropriate mobility.    Patient Centered Rounds: I performed bedside rounds with nursing staff today.   Discussions with Specialists or Other Care Team Provider: Multidisciplinary team    Education and Discussions with Family / Patient: Attempted to update   (sister) via phone. Left voicemail.     Current Length of Stay: 6 day(s)  Current Patient Status: Inpatient   Certification Statement: The patient will continue to require additional inpatient hospital stay due to deconditioning, PT recommending possible short-term rehab, referral case management follow-up labs in a.m.  Discharge Plan: Anticipate discharge in 24-48 hrs to follow-up with case management regarding disposition    Code Status: Level 1 - Full Code    Subjective   Patient seen initially having breakfast, on dialysis.  Seen later in the morning, almost finished with dialysis.  Patient voiced concerns regarding ability to be able to navigate stairs into his home if discharged today.  We discussed having physical therapy work with him once again which he is agreeable to.  Later in the day physical therapy indicated patient would benefit from some type of rehab, patient indicated that he would be willing to talk with case management regarding options.    Objective :  Temp:  [98 °F (36.7 °C)-98.6 °F (37 °C)] 98.1 °F (36.7 °C)  HR:  [65-82] 74  BP: (102-175)/(53-71) 156/62  Resp:  [18-20] 18  SpO2:  [95 %-98 %] 95 %  O2 Device: None (Room air)    Body mass index is 28.38 kg/m².     Input and Output Summary (last 24 hours):     Intake/Output Summary (Last 24 hours) at 10/15/2024 1702  Last data filed at 10/15/2024 1211  Gross per 24 hour   Intake 500 ml   Output 2017 ml   Net -1517 ml       Physical Exam  Vitals reviewed.   HENT:      Head: Normocephalic.      Nose: Nose normal.      Mouth/Throat:      Mouth: Mucous membranes are moist.   Eyes:      Extraocular Movements: Extraocular movements intact.      Conjunctiva/sclera: Conjunctivae normal.      Pupils: Pupils are equal, round, and reactive to light.   Cardiovascular:      Rate and Rhythm: Normal rate.      Pulses: Normal pulses.   Pulmonary:      Effort: Pulmonary effort is normal.      Breath sounds: Normal breath sounds.   Abdominal:       General: Bowel sounds are normal. There is no distension.      Palpations: Abdomen is soft.      Tenderness: There is no abdominal tenderness.   Genitourinary:     Comments: Voiding spontaneously   Musculoskeletal:      Cervical back: Normal range of motion.      Comments: Generalized deconditioning   Skin:     Capillary Refill: Capillary refill takes less than 2 seconds.      Findings: Bruising present.   Neurological:      General: No focal deficit present.      Mental Status: He is alert and oriented to person, place, and time.   Psychiatric:         Mood and Affect: Mood normal.         Behavior: Behavior normal.         Thought Content: Thought content normal.         Judgment: Judgment normal.           Lines/Drains:  Lines/Drains/Airways       Active Status       Name Placement date Placement time Site Days    HD Permanent Double Catheter --  --  Internal jugular  --                            Lab Results: I have reviewed the following results:   Results from last 7 days   Lab Units 10/15/24  0831 10/12/24  0435 10/11/24  0510   WBC Thousand/uL 6.01   < > 6.51   HEMOGLOBIN g/dL 7.6*   < > 7.9*   HEMATOCRIT % 23.7*   < > 24.1*   PLATELETS Thousands/uL 213   < > 207   SEGS PCT %  --   --  74   LYMPHO PCT %  --   --  12*   MONO PCT %  --   --  10   EOS PCT %  --   --  4    < > = values in this interval not displayed.     Results from last 7 days   Lab Units 10/15/24  0831   SODIUM mmol/L 135   POTASSIUM mmol/L 4.3   CHLORIDE mmol/L 99   CO2 mmol/L 27   BUN mg/dL 49*   CREATININE mg/dL 7.85*   ANION GAP mmol/L 9   CALCIUM mg/dL 7.7*   GLUCOSE RANDOM mg/dL 94         Results from last 7 days   Lab Units 10/15/24  1553 10/15/24  1142 10/15/24  0727 10/14/24  2012 10/14/24  1602 10/14/24  1115 10/14/24  0736 10/13/24  2048 10/13/24  1609 10/13/24  1112 10/13/24  0728 10/12/24  2043   POC GLUCOSE mg/dl 121 92 107 146* 132 94 112 125 116 114 93 106               Recent Cultures (last 7 days):         Imaging Results  Review: No pertinent imaging studies reviewed.  Other Study Results Review: No additional pertinent studies reviewed.    Last 24 Hours Medication List:     Current Facility-Administered Medications:     acetaminophen (Ofirmev) injection 1,000 mg, Q6H PRN, Last Rate: Stopped (10/12/24 1010)    allopurinol (ZYLOPRIM) tablet 100 mg, Daily    atorvastatin (LIPITOR) tablet 40 mg, Daily With Dinner    docusate sodium (COLACE) capsule 100 mg, BID    escitalopram (LEXAPRO) tablet 10 mg, Daily    gabapentin (NEURONTIN) capsule 300 mg, BID    hydrALAZINE (APRESOLINE) injection 10 mg, Q6H PRN    influenza vaccine, high-dose (Fluzone High-Dose) IM injection 0.5 mL, Once PRN    insulin lispro (HumALOG/ADMELOG) 100 units/mL subcutaneous injection 1-6 Units, TID AC **AND** Fingerstick Glucose (POCT), TID AC    labetalol (NORMODYNE) injection 10 mg, Q4H PRN    NIFEdipine (PROCARDIA XL) 24 hr tablet 60 mg, After Dinner    ondansetron (ZOFRAN) injection 4 mg, Q6H PRN    pantoprazole (PROTONIX) injection 40 mg, Q12H KEMAL    polyethylene glycol (MIRALAX) packet 17 g, Daily PRN    sodium hypochlorite (DAKIN'S HALF-STRENGTH) 0.25 percent topical solution 1 Application, Daily    Administrative Statements   Today, Patient Was Seen By: GRANT Valdivia  I have spent a total time of greater than 45 minutes in caring for this patient on the day of the visit/encounter including Diagnostic results, Counseling / Coordination of care, Documenting in the medical record, Reviewing / ordering tests, medicine, procedures  , and Communicating with other healthcare professionals .    **Please Note: This note may have been constructed using a voice recognition system.**

## 2024-10-15 NOTE — ASSESSMENT & PLAN NOTE
Current hemoglobin: 8.1 mg/dL  Multifactorial secondary to ESRD and hematuria  Treatment:  Plan to give Epogen 3000 units on dialysis today  Transfuse for hemoglobin less than 7.0 per primary service

## 2024-10-15 NOTE — ASSESSMENT & PLAN NOTE
Lab Results   Component Value Date    HGBA1C 5.5 10/08/2024       Recent Labs     10/14/24  2012 10/15/24  0727 10/15/24  1142 10/15/24  1553   POCGLU 146* 107 92 121   [facility]  Diabetic diet

## 2024-10-15 NOTE — ASSESSMENT & PLAN NOTE
Volume: Fluid overload  Blood pressure: Borderline hypertension, /63  Recommend low-sodium diet  Fluid restriction as above  Continue with nifedipine 60 mg

## 2024-10-15 NOTE — ASSESSMENT & PLAN NOTE
Lab Results   Component Value Date    EGFR 8 10/14/2024    EGFR 11 10/13/2024    EGFR 7 10/12/2024    CREATININE 6.44 (H) 10/14/2024    CREATININE 4.93 (H) 10/13/2024    CREATININE 6.92 (H) 10/12/2024   ESRD on HD TTS:  Dialysis unit/days:[facility]   Access: PermCath  Due for dialysis today   Renal Diet  Fluid restriction 1.5L/d  Adjust medications to GFR<10  Avoid opioids   Patient can continue dialysis at outpatient dialysis unit on discharge

## 2024-10-16 PROBLEM — R31.9 HEMATURIA: Status: RESOLVED | Noted: 2024-10-10 | Resolved: 2024-10-16

## 2024-10-16 LAB
ANION GAP SERPL CALCULATED.3IONS-SCNC: 9 MMOL/L (ref 4–13)
BUN SERPL-MCNC: 25 MG/DL (ref 5–25)
CALCIUM SERPL-MCNC: 8.2 MG/DL (ref 8.4–10.2)
CHLORIDE SERPL-SCNC: 98 MMOL/L (ref 96–108)
CO2 SERPL-SCNC: 27 MMOL/L (ref 21–32)
CREAT SERPL-MCNC: 4.62 MG/DL (ref 0.6–1.3)
ERYTHROCYTE [DISTWIDTH] IN BLOOD BY AUTOMATED COUNT: 16.1 % (ref 11.6–15.1)
GFR SERPL CREATININE-BSD FRML MDRD: 12 ML/MIN/1.73SQ M
GLUCOSE SERPL-MCNC: 100 MG/DL (ref 65–140)
GLUCOSE SERPL-MCNC: 106 MG/DL (ref 65–140)
GLUCOSE SERPL-MCNC: 111 MG/DL (ref 65–140)
GLUCOSE SERPL-MCNC: 135 MG/DL (ref 65–140)
GLUCOSE SERPL-MCNC: 141 MG/DL (ref 65–140)
HCT VFR BLD AUTO: 24.4 % (ref 36.5–49.3)
HCT VFR BLD AUTO: 24.4 % (ref 36.5–49.3)
HGB BLD-MCNC: 7.6 G/DL (ref 12–17)
HGB BLD-MCNC: 7.8 G/DL (ref 12–17)
MCH RBC QN AUTO: 29.7 PG (ref 26.8–34.3)
MCHC RBC AUTO-ENTMCNC: 32 G/DL (ref 31.4–37.4)
MCV RBC AUTO: 93 FL (ref 82–98)
PLATELET # BLD AUTO: 210 THOUSANDS/UL (ref 149–390)
PMV BLD AUTO: 9.3 FL (ref 8.9–12.7)
POTASSIUM SERPL-SCNC: 4.1 MMOL/L (ref 3.5–5.3)
RBC # BLD AUTO: 2.63 MILLION/UL (ref 3.88–5.62)
SODIUM SERPL-SCNC: 134 MMOL/L (ref 135–147)
WBC # BLD AUTO: 6.82 THOUSAND/UL (ref 4.31–10.16)

## 2024-10-16 PROCEDURE — 99232 SBSQ HOSP IP/OBS MODERATE 35: CPT

## 2024-10-16 PROCEDURE — 99232 SBSQ HOSP IP/OBS MODERATE 35: CPT | Performed by: STUDENT IN AN ORGANIZED HEALTH CARE EDUCATION/TRAINING PROGRAM

## 2024-10-16 PROCEDURE — 82948 REAGENT STRIP/BLOOD GLUCOSE: CPT

## 2024-10-16 PROCEDURE — 85027 COMPLETE CBC AUTOMATED: CPT | Performed by: NURSE PRACTITIONER

## 2024-10-16 PROCEDURE — 85014 HEMATOCRIT: CPT

## 2024-10-16 PROCEDURE — 80048 BASIC METABOLIC PNL TOTAL CA: CPT | Performed by: NURSE PRACTITIONER

## 2024-10-16 PROCEDURE — 85018 HEMOGLOBIN: CPT

## 2024-10-16 RX ORDER — HEPARIN SODIUM 5000 [USP'U]/ML
5000 INJECTION, SOLUTION INTRAVENOUS; SUBCUTANEOUS EVERY 8 HOURS SCHEDULED
Status: DISCONTINUED | OUTPATIENT
Start: 2024-10-17 | End: 2024-10-22 | Stop reason: HOSPADM

## 2024-10-16 RX ORDER — ASPIRIN 81 MG/1
81 TABLET, CHEWABLE ORAL DAILY
Status: DISCONTINUED | OUTPATIENT
Start: 2024-10-16 | End: 2024-10-22 | Stop reason: HOSPADM

## 2024-10-16 RX ADMIN — ALLOPURINOL 100 MG: 100 TABLET ORAL at 10:56

## 2024-10-16 RX ADMIN — PANTOPRAZOLE SODIUM 40 MG: 40 INJECTION, POWDER, FOR SOLUTION INTRAVENOUS at 20:19

## 2024-10-16 RX ADMIN — NIFEDIPINE 60 MG: 30 TABLET, EXTENDED RELEASE ORAL at 18:00

## 2024-10-16 RX ADMIN — ASPIRIN 81 MG CHEWABLE TABLET 81 MG: 81 TABLET CHEWABLE at 10:56

## 2024-10-16 RX ADMIN — GABAPENTIN 300 MG: 300 CAPSULE ORAL at 18:00

## 2024-10-16 RX ADMIN — PANTOPRAZOLE SODIUM 40 MG: 40 INJECTION, POWDER, FOR SOLUTION INTRAVENOUS at 10:55

## 2024-10-16 RX ADMIN — ATORVASTATIN CALCIUM 40 MG: 40 TABLET, FILM COATED ORAL at 17:15

## 2024-10-16 RX ADMIN — HYOSCYAMINE SULFATE 1 APPLICATION: 16 SOLUTION at 10:56

## 2024-10-16 RX ADMIN — GABAPENTIN 300 MG: 300 CAPSULE ORAL at 10:56

## 2024-10-16 RX ADMIN — ESCITALOPRAM OXALATE 10 MG: 10 TABLET ORAL at 10:56

## 2024-10-16 NOTE — ASSESSMENT & PLAN NOTE
Lab Results   Component Value Date    EGFR 12 10/16/2024    EGFR 6 10/15/2024    EGFR 8 10/14/2024    CREATININE 4.62 (H) 10/16/2024    CREATININE 7.85 (H) 10/15/2024    CREATININE 6.44 (H) 10/14/2024   ESRD on HD TTS:  Dialysis unit/days:[facility]   Access: PermCath  Patient had dialysis yesterda,UF 1 L   Renal Diet  Fluid restriction 1.5L/d  Adjust medications to GFR<10  Avoid opioids   Plan to continue to use per schedule, next dialysis on Thursday  Patient is stable for discharge from my end

## 2024-10-16 NOTE — ASSESSMENT & PLAN NOTE
Current hemoglobin: 7.8 mg/dL  Multifactorial secondary to ESRD   Treatment:  Received 3000 units of Epogen on 10/15  transfuse for hemoglobin less than 7.0 per primary service

## 2024-10-16 NOTE — ASSESSMENT & PLAN NOTE
Lab Results   Component Value Date    EGFR 12 10/16/2024    EGFR 6 10/15/2024    EGFR 8 10/14/2024    CREATININE 4.62 (H) 10/16/2024    CREATININE 7.85 (H) 10/15/2024    CREATININE 6.44 (H) 10/14/2024   Serum calcium 8.2 mg/dL   phosphorus at goal 5.4  No indication of binders at this time

## 2024-10-16 NOTE — ASSESSMENT & PLAN NOTE
Volume: Appears euvolemic  Blood pressure: Normotensive, /63  Continue with low-sodium diet and fluid restriction 1.5 to 1.8 L   Fluid restriction as above  Continue with nifedipine 60 mg

## 2024-10-16 NOTE — ASSESSMENT & PLAN NOTE
Hemoglobin 10.2 on admission, noted to drop to 6.7 after TNK administration  Patient initially declined CT a/p and blood transfusion, eventually agreeable to 1 unit PRBC on 10/10  Urology following for hematuria, urine now clear  Denies black/bloody bowel movements  Aspirin and DVT prophylaxis currently on hold  Hemoglobin 7.6 this morning  Patient reports he still would like to hold off CT a/p to evaluate for source of bleeding  des 72, TIBC low, B12 and folate within normal limits    Acute anemia resolved   Restart ASA 10/16   Monitor H/H Q12

## 2024-10-16 NOTE — ASSESSMENT & PLAN NOTE
Lab Results   Component Value Date    HGBA1C 5.5 10/08/2024       Recent Labs     10/15/24  1553 10/15/24  2034 10/16/24  0716 10/16/24  1150   POCGLU 121 123 100 111   [facility]  Diabetic diet

## 2024-10-16 NOTE — WOUND OSTOMY CARE
Progress Note - Wound   Naresh Carpio 64 y.o. male MRN: [id number]  Unit/Bed#: [address] [place] [address] Encounter: [id number]        Assessment:   This is a follow up visit for this 64 year old male patient admitted on 10/8/24 with toxic metabolic encephalopathy. Patient was agreeable to have wound visit done and asked if he could sit with legs dependent due to neuropathy pain.  Patient is continent of urine and stool and was able to stand independently at bedside.    Findings:  1-Full thickness traumatic wound s/p fall to right anterior lower leg with pink granulation tissue, yellow slough and green/yellow purulent drainage. Orders in place for wound care.  2-Full thickness traumatic wound s/p fall to right anterior knee with pink granulation tissue, yellow slough and no drainage due wound found uncovered. Orders in place for wound care.  3-Sacrobuttocks intact with blanchable erythema - orders in place for skin care and for prevention.  3-Wounds to left 1st and 2nd toes have healed.     Wound Care Plan:  1-Cleanse wound to right lower leg with NSS & pat dry. Apply barrier cream to periwound for protection then apply Dakin's soaked gauze x 5 minutes, rinse with NSS & pat dry. Apply thin layer of Normlgel to wound, Adaptic cut to size of wound, ABD, rolled gauze and tape. Change daily & prn soilage/dislodgement.  2-Cleanse wound to right knee with NSS & pat dry. Apply thin layer of Normlgel to wound, cover with Adaptic cut to size of wound and small bordered foam dressing. Change every other day & prn soilage/dislodgement.  3-Apply Prevent barrier cream to bilateral sacrum, buttock and heels BID and PRN  4-Float heels on 2 pillows to offload pressure so heels are not in contact with mattress or pillows.  5-Ehob pressure redistribution cushion in chair when out of bed if able. Avoid prolonged sitting.  6-Moisturize skin daily with skin nourishing cream.  7-Turn/reposition q2h or when medically stable for pressure  re-distribution on skin.      Wound 10/08/24 Abrasion(s) Leg Right (Active)   Wound Image   10/16/24 1406   Wound Description Granulation tissue;Pink;Slough;Yellow 10/16/24 1406   Liliana-wound Assessment Erythema;Hyperpigmented 10/16/24 1406   Wound Length (cm) 14.5 cm 10/16/24 1406   Wound Width (cm) 6.5 cm 10/16/24 1406   Wound Depth (cm) 0.1 cm 10/16/24 1406   Wound Surface Area (cm^2) 94.25 cm^2 10/16/24 1406   Wound Volume (cm^3) 9.425 cm^3 10/16/24 1406   Calculated Wound Volume (cm^3) 9.43 cm^3 10/16/24 1406   Change in Wound Size % -41.17 10/16/24 1406   Drainage Amount Moderate 10/16/24 1406   Drainage Description Serous;Yellow;Green;Purulent 10/16/24 1406   Non-staged Wound Description Full thickness 10/16/24 1406   Treatments Cleansed 10/16/24 1406   Dressing Normlgel;Adaptic;ABD;Dry dressing 10/16/24 1406   Wound packed? No 10/16/24 1406   Dressing Changed New 10/16/24 1406   Dressing Status Clean;Dry;Intact 10/16/24 1406       Wound 10/08/24 Traumatic Abrasion(s) Knee Anterior;Right (Active)   Wound Image   10/16/24 1406   Wound Description Slough;Yellow 10/16/24 1406   Liliana-wound Assessment Erythema 10/16/24 1406   Wound Length (cm) 0.7 cm 10/16/24 1406   Wound Width (cm) 1 cm 10/16/24 1406   Wound Depth (cm) 0.1 cm 10/16/24 1406   Wound Surface Area (cm^2) 0.7 cm^2 10/16/24 1406   Wound Volume (cm^3) 0.07 cm^3 10/16/24 1406   Calculated Wound Volume (cm^3) 0.07 cm^3 10/16/24 1406   Change in Wound Size % 61.11 10/16/24 1406   Drainage Amount None 10/16/24 1406   Non-staged Wound Description Full thickness 10/16/24 1406   Treatments Cleansed 10/16/24 1406   Dressing Normlgel;Adaptic;Foam 10/16/24 1406   Wound packed? No 10/16/24 1406   Dressing Changed New 10/16/24 1406   Dressing Status Clean;Dry;Intact 10/16/24 1406     Discussed assessment findings, and plan of care/recommendations with Ana Laura RAWLS.    Wound care will follow along with patient throughout admission, please call or text with questions  and concerns.    Recommendations written as orders.  Love Andrew MSN, RN, CWON

## 2024-10-16 NOTE — PLAN OF CARE
Problem: Potential for Falls  Goal: Patient will remain free of falls  Description: INTERVENTIONS:  - Educate patient/family on patient safety including physical limitations  - Instruct patient to call for assistance with activity   - Consult OT/PT to assist with strengthening/mobility   - Keep Call bell within reach  - Keep bed low and locked with side rails adjusted as appropriate  - Keep care items and personal belongings within reach  - Initiate and maintain comfort rounds  - Make Fall Risk Sign visible to staff  - Offer Toileting every 2 Hours, in advance of need  - Initiate/Maintain bed alarm  - Obtain necessary fall risk management equipment: fall risk sign on door  - Apply yellow socks and bracelet for high fall risk patients  - Consider moving patient to room near nurses station  Outcome: Progressing     Problem: METABOLIC, FLUID AND ELECTROLYTES - ADULT  Goal: Electrolytes maintained within normal limits  Description: INTERVENTIONS:  - Monitor labs and assess patient for signs and symptoms of electrolyte imbalances  - Administer electrolyte replacement as ordered  - Monitor response to electrolyte replacements, including repeat lab results as appropriate  - Instruct patient on fluid and nutrition as appropriate  Outcome: Progressing     Problem: NEUROSENSORY - ADULT  Goal: Achieves stable or improved neurological status  Description: INTERVENTIONS  - Monitor and report changes in neurological status  - Monitor vital signs such as temperature, blood pressure, glucose, and any other labs ordered       Outcome: Progressing  Goal: Achieves maximal functionality and self care  Description: INTERVENTIONS  - Monitor swallowing and airway patency with patient fatigue and changes in neurological status  - Encourage and assist patient to increase activity and self care.   - Encourage visually impaired, hearing impaired and aphasic patients to use assistive/communication devices  Outcome: Progressing     Problem:  Prexisting or High Potential for Compromised Skin Integrity  Goal: Skin integrity is maintained or improved  Description: INTERVENTIONS:  - Identify patients at risk for skin breakdown  - Assess and monitor skin integrity  - Assess and monitor nutrition and hydration status  - Monitor labs   - Assess for incontinence   - Turn and reposition patient  - Assist with mobility/ambulation  - Relieve pressure over bony prominences  - Avoid friction and shearing  - Provide appropriate hygiene as needed including keeping skin clean and dry  - Evaluate need for skin moisturizer/barrier cream  - Collaborate with interdisciplinary team   - Patient/family teaching  - Consider wound care consult   Outcome: Progressing     Problem: PAIN - ADULT  Goal: Verbalizes/displays adequate comfort level or baseline comfort level  Description: Interventions:  - Encourage patient to monitor pain and request assistance  - Assess pain using appropriate pain scale  - Administer analgesics based on type and severity of pain and evaluate response  - Implement non-pharmacological measures as appropriate and evaluate response  - Consider cultural and social influences on pain and pain management  - Notify physician/advanced practitioner if interventions unsuccessful or patient reports new pain  Outcome: Progressing     Problem: INFECTION - ADULT  Goal: Absence or prevention of progression during hospitalization  Description: INTERVENTIONS:  - Assess and monitor for signs and symptoms of infection  - Monitor lab/diagnostic results  - Monitor all insertion sites, i.e. indwelling lines, tubes, and drains  - Administer medications as ordered  - Instruct and encourage patient and family to use good hand hygiene technique  - Identify and instruct in appropriate isolation precautions for identified infection/condition  Outcome: Progressing  Goal: Absence of fever/infection during neutropenic period  Description: INTERVENTIONS:  - Monitor WBC    Outcome:  Progressing     Problem: SAFETY ADULT  Goal: Patient will remain free of falls  Description: INTERVENTIONS:  - Educate patient/family on patient safety including physical limitations  - Instruct patient to call for assistance with activity   - Consult OT/PT to assist with strengthening/mobility   - Keep Call bell within reach  - Keep bed low and locked with side rails adjusted as appropriate  - Keep care items and personal belongings within reach  - Initiate and maintain comfort rounds  - Make Fall Risk Sign visible to staff  - Offer Toileting every 2 Hours, in advance of need  - Initiate/Maintain bed alarm  - Obtain necessary fall risk management equipment: fall risk sign on door  - Apply yellow socks and bracelet for high fall risk patients  - Consider moving patient to room near nurses station  Outcome: Progressing  Goal: Maintain or return to baseline ADL function  Description: INTERVENTIONS:  -  Assess patient's ability to carry out ADLs; assess patient's baseline for ADL function and identify physical deficits which impact ability to perform ADLs (bathing, care of mouth/teeth, toileting, grooming, dressing, etc.)  - Assess/evaluate cause of self-care deficits   - Assess range of motion  - Assess patient's mobility; develop plan if impaired  - Assess patient's need for assistive devices and provide as appropriate  - Encourage maximum independence but intervene and supervise when necessary  - Involve family in performance of ADLs  - Assess for home care needs following discharge   - Consider OT consult to assist with ADL evaluation and planning for discharge  - Provide patient education as appropriate  Outcome: Progressing  Goal: Maintains/Returns to pre admission functional level  Description: INTERVENTIONS:  - Perform AM-PAC 6 Click Basic Mobility/ Daily Activity assessment daily.  - Set and communicate daily mobility goal to care team and patient/family/caregiver.   - Collaborate with rehabilitation services  on mobility goals if consulted  - Perform Range of Motion 2 times a day.  - Reposition patient every 2 hours.  - Dangle patient 2 times a day  - Stand patient 2 times a day  - Ambulate patient 3 times a day  - Out of bed to chair 3 times a day   - Out of bed for meals 3 times a day  - Out of bed for toileting  - Record patient progress and toleration of activity level   Outcome: Progressing     Problem: DISCHARGE PLANNING  Goal: Discharge to home or other facility with appropriate resources  Description: INTERVENTIONS:  - Identify barriers to discharge w/patient and caregiver  - Arrange for needed discharge resources and transportation as appropriate  - Identify discharge learning needs (meds, wound care, etc.)  - Arrange for interpretive services to assist at discharge as needed  - Refer to Case Management Department for coordinating discharge planning if the patient needs post-hospital services based on physician/advanced practitioner order or complex needs related to functional status, cognitive ability, or social support system  Outcome: Progressing     Problem: Knowledge Deficit  Goal: Patient/family/caregiver demonstrates understanding of disease process, treatment plan, medications, and discharge instructions  Description: Complete learning assessment and assess knowledge base.  Interventions:  - Provide teaching at level of understanding  - Provide teaching via preferred learning methods  Outcome: Progressing     Problem: HEMATOLOGIC - ADULT  Goal: Maintains hematologic stability  Description: INTERVENTIONS  - Assess for signs and symptoms of bleeding or hemorrhage  - Monitor labs  - Administer supportive blood products/factors as ordered and appropriate  Outcome: Progressing     Problem: MUSCULOSKELETAL - ADULT  Goal: Maintain or return mobility to safest level of function  Description: INTERVENTIONS:  - Assess patient's ability to carry out ADLs; assess patient's baseline for ADL function and identify  physical deficits which impact ability to perform ADLs (bathing, care of mouth/teeth, toileting, grooming, dressing, etc.)  - Assess/evaluate cause of self-care deficits   - Assess range of motion  - Assess patient's mobility  - Assess patient's need for assistive devices and provide as appropriate  - Encourage maximum independence but intervene and supervise when necessary  - Involve family in performance of ADLs  - Assess for home care needs following discharge   - Consider OT consult to assist with ADL evaluation and planning for discharge  - Provide patient education as appropriate  Outcome: Progressing  Goal: Maintain proper alignment of affected body part  Description: INTERVENTIONS:  - Support, maintain and protect limb and body alignment  - Provide patient/ family with appropriate education  Outcome: Progressing     Problem: Communication Impairment  Goal: Ability to express needs and understand communication  Description: Assess patient's communication skills and ability to understand information.  Patient will demonstrate use of effective communication techniques, alternative methods of communication and understanding even if not able to speak.     - Encourage communication and provide alternate methods of communication as needed.  - Collaborate with case management/ for discharge needs.  - Include patient/family/caregiver in decisions related to communication.  Outcome: Progressing     Problem: Potential for Aspiration  Goal: Non-ventilated patient's risk of aspiration is minimized  Description: Assess and monitor vital signs, respiratory status, and labs (WBC).  Monitor for signs of aspiration (tachypnea, cough, rales, wheezing, cyanosis, fever).    - Assess and monitor patient's ability to swallow.  - Place patient up in chair to eat if possible.  - HOB up at 90 degrees to eat if unable to get patient up into chair.  - Supervise patient during oral intake.   - Instruct patient/ family to  take small bites.  - Instruct patient/ family to take small single sips when taking liquids.  - Follow patient-specific strategies generated by speech pathologist.  Outcome: Progressing     Problem: Neurological Deficit  Goal: Neurological status is stable or improving  Description: Interventions:  - Monitor and assess patient's level of consciousness, motor function, sensory function, and level of assistance needed for ADLs.   - Monitor and report changes from baseline. Collaborate with interdisciplinary team to initiate plan and implement interventions as ordered.   - Provide and maintain a safe environment.  - Consider seizure precautions.  - Consider fall precautions.  - Consider aspiration precautions.  - Consider bleeding precautions.  Outcome: Progressing     Problem: Activity Intolerance/Impaired Mobility  Goal: Mobility/activity is maintained at optimum level for patient  Description: Interventions:  - Assess and monitor patient  barriers to mobility and need for assistive/adaptive devices.  - Assess patient's emotional response to limitations.  - Collaborate with interdisciplinary team and initiate plans and interventions as ordered.  - Encourage independent activity per ability.  - Maintain proper body alignment.  - Perform active rom as tolerated/ordered.  - Plan activities to conserve energy.  - Turn patient as appropriate  Outcome: Progressing     Problem: Nutrition  Goal: Nutrition/Hydration status is improving  Description: Monitor and assess patient's nutrition/hydration status for malnutrition (ex- brittle hair, bruises, dry skin, pale skin and conjunctiva, muscle wasting, smooth red tongue, and disorientation). Collaborate with interdisciplinary team and initiate plan and interventions as ordered.  Monitor patient's weight and dietary intake as ordered or per policy. Utilize nutrition screening tool and intervene per policy. Determine patient's food preferences and provide high-protein,  high-caloric foods as appropriate.     - Assist patient with eating.  - Allow adequate time for meals.  - Encourage patient to take dietary supplement as ordered.  - Collaborate with clinical nutritionist.  - Include patient/family/caregiver in decisions related to nutrition.  Outcome: Progressing

## 2024-10-16 NOTE — ASSESSMENT & PLAN NOTE
RR called 10/8 for acute change in mental status with last well known 12:58, stroke alert called 1:16 pm  NIH 8 and decision was made to give TNK after initial CT head negative for bleed  CTA head/neck showed intracranial ICA atherosclerosis - outpatient vascular surgery referral  MRI brain without evidence of acute CVA  Suspect metabolic encephalopathy in setting of ESRD with missed dialysis  Patient back at baseline mentation; encephalopathy resolved   Pending further recommendations from CM regarding placement

## 2024-10-16 NOTE — PROGRESS NOTES
Patient examined spoke to the nurse nephrology and urology notes reviewed and noted.  Patient seems to be doing better he is able to smile and affect is brighter communicates better and he reports that I am doing good.  Nurse reports that he is doing all right.  Medical treatment is in progress.  Patient also participate in physical therapy.  Patient plans to go home and he may benefit from outpatient physical therapy after the discharge.  Patient is reminded that he should follow regularly for his medical treatment including hemodialysis he understood and agreed.  Patient has multiple medical problems he is depressed but he is minimizing the he is tolerating Lexapro well and patient is encouraged to continue medication after the discharge.  No psychosis no hallucination no agitation not lethargic not confused and able to communicate well patient denies feeling suicidal.  Therapy is done with good response.  Continue supportive therapy.  Patient is a lot of emotional support.  He lives alone.  I will follow-up.

## 2024-10-16 NOTE — ASSESSMENT & PLAN NOTE
Lab Results   Component Value Date    HGBA1C 5.5 10/08/2024       Recent Labs     10/15/24  0727 10/15/24  1142 10/15/24  1553 10/15/24  2034   POCGLU 107 92 121 123       Blood Sugar Average: Last 72 hrs:  (P) [id number]    HbA1c 5.5  Glucose 106  Recommend good diabetes control   Continue with insulin  Maintain healthy diet (vegetables, fruits, whole grains, nonfat or low fat)  Weight loss  Physical activity (5 to 10 minutes to start the increase to 30 min a day)

## 2024-10-16 NOTE — ASSESSMENT & PLAN NOTE
Lab Results   Component Value Date    EGFR 12 10/16/2024    EGFR 6 10/15/2024    EGFR 8 10/14/2024    CREATININE 4.62 (H) 10/16/2024    CREATININE 7.85 (H) 10/15/2024    CREATININE 6.44 (H) 10/14/2024   Patient underwent dialysis on 10/15, continue TTS schedule

## 2024-10-16 NOTE — PROGRESS NOTES
"Progress Note - Urology      Patient: Naresh Carpio   : 1960 Sex: male   MRN: [id number]     CSN: [id number]  Unit/Bed#: [address] [place] [address]     SUBJECTIVE:   Patient seen on afternoon rounds  Hematuria resolved after catheter removed  May be traumatic Arguelles  Could consider cystoscopy exam under anesthesia retrograde pyelogram to be done tomorrow after dialysis in the afternoon if feasible      Objective   Vitals: /65   Pulse 66   Temp 99.2 °F (37.3 °C)   Resp 18   Ht 6' 1\" (1.854 m)   Wt 95.9 kg (211 lb 8 oz)   SpO2 97%   BMI 27.90 kg/m²     I/O last 24 hours:  In: 750 [P.O.:250; I.V.:500]  Out: 2167 [Urine:700; Other:1467]      Physical Exam:   General Alert awake   Normocephalic atraumatic PERRLA  Lungs clear bilaterally  Cardiac normal S1 normal S2  Abdomen soft, flank pain  Extremities no edema      Lab Results: CBC:   Lab Results   Component Value Date    WBC 6.82 10/16/2024    HGB 7.8 (L) 10/16/2024    HCT 24.4 (L) 10/16/2024    MCV 93 10/16/2024     10/16/2024    RBC 2.63 (L) 10/16/2024    MCH 29.7 10/16/2024    MCHC 32.0 10/16/2024    RDW 16.1 (H) 10/16/2024    MPV 9.3 10/16/2024    NRBC 0 10/11/2024     CMP:   Lab Results   Component Value Date    CL 98 10/16/2024    CO2 27 10/16/2024    BUN 25 10/16/2024    CREATININE 4.62 (H) 10/16/2024    CALCIUM 8.2 (L) 10/16/2024    AST 25 10/08/2024    ALT 39 10/08/2024    ALKPHOS 100 10/08/2024    EGFR 12 10/16/2024     Urinalysis:   Lab Results   Component Value Date    COLORU Yellow 2023    CLARITYU Clear 2023    SPECGRAV 1.010 2023    PHUR 7.0 2023    LEUKOCYTESUR Negative 2023    NITRITE Negative 2023    GLUCOSEU 100 (1/10%) (A) 2023    KETONESU Negative 2023    BILIRUBINUR Negative 2023    BLOODU Small (A) 2023     Urine Culture: No results found for: \"URINECX\"  PSA: No results found for: \"PSA\"      Assessment/ Plan:  Chronic renal failure  Gross hematuria  Will make n.p.o. " after midnight  Cystoscopy bilateral retrograde pyelograms possible biopsy TURBT          Pete Rivera MD

## 2024-10-16 NOTE — PROGRESS NOTES
Progress Note - Nephrology   Name: Naresh Carpio 64 y.o. male I MRN: [id number]  Unit/Bed#: 4 [place] 414-01 I Date of Admission: 10/8/2024   Date of Service: 10/16/2024 I Hospital Day: 7     Assessment & Plan  ESRD (end stage renal disease) ([facility])  Lab Results   Component Value Date    EGFR 12 10/16/2024    EGFR 6 10/15/2024    EGFR 8 10/14/2024    CREATININE 4.62 (H) 10/16/2024    CREATININE 7.85 (H) 10/15/2024    CREATININE 6.44 (H) 10/14/2024   ESRD on HD TTS:  Dialysis unit/days:[facility]   Access: PermCath  Patient had dialysis yesterda,UF 1 L   Renal Diet  Fluid restriction 1.5L/d  Adjust medications to GFR<10  Avoid opioids   Plan to continue to use per schedule, next dialysis on Thursday  Patient is stable for discharge from my end        Type 2 diabetes mellitus, without long-term current use of insulin ([facility])  Lab Results   Component Value Date    HGBA1C 5.5 10/08/2024       Recent Labs     10/15/24  0727 10/15/24  1142 10/15/24  1553 10/15/24  2034   POCGLU 107 92 121 123       Blood Sugar Average: Last 72 hrs:  (P) [id number]    HbA1c 5.5  Glucose 106  Recommend good diabetes control   Continue with insulin  Maintain healthy diet (vegetables, fruits, whole grains, nonfat or low fat)  Weight loss  Physical activity (5 to 10 minutes to start the increase to 30 min a day)    Acute on chronic anemia  Current hemoglobin: 7.8 mg/dL  Multifactorial secondary to ESRD   Treatment:  Received 3000 units of Epogen on 10/15  transfuse for hemoglobin less than 7.0 per primary service      Status post fall  PT OT  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, on chronic dialysis ([facility])  Lab Results   Component Value Date    EGFR 12 10/16/2024    EGFR 6 10/15/2024    EGFR 8 10/14/2024    CREATININE 4.62 (H) 10/16/2024    CREATININE 7.85 (H) 10/15/2024    CREATININE 6.44 (H) 10/14/2024   Serum calcium 8.2 mg/dL   phosphorus at goal 5.4  No indication of binders at this  time  Hypertension  Volume: Appears euvolemic  Blood pressure: Normotensive, /63  Continue with low-sodium diet and fluid restriction 1.5 to 1.8 L   Fluid restriction as above  Continue with nifedipine 60 mg     I have reviewed the nephrology recommendations including dialysis tomorrow, with primary team, and we are in agreement with renal plan including the information outlined above. Ok for discharge from Nephrology service perspective.    Subjective   Brief History of Admission - 65 yo man with PMH of ESRD on HD p/ after a fall. Nephrology is consulted for management of ESRD     Feels well, no shortness of breath.  Tolerated dialysis well yesterday    Objective :  Temp:  [98 °F (36.7 °C)-98.7 °F (37.1 °C)] 98.7 °F (37.1 °C)  HR:  [65-82] 70  BP: (102-175)/() 142/63  Resp:  [17-20] 17  SpO2:  [95 %-98 %] 95 %  O2 Device: None (Room air)    Current Weight: Weight - Scale: 95.9 kg (211 lb 8 oz)  First Weight: Weight - Scale: 112 kg (245 lb 13 oz)  I/O         10/14 0701  10/15 0700 10/15 0701  10/16 0700    P.O.  250    I.V. (mL/kg)  500 (5.2)    Total Intake(mL/kg)  750 (7.8)    Urine (mL/kg/hr) 400 (0.2) 700 (0.3)    Other  1467    Total Output 400 2167    Net -400 -1417                Physical Exam  General:  no acute distress at this time  Skin:  No acute rash  Eyes:  No scleral icterus and noninjected  ENT:  mucous membranes moist  Neck:  no carotid bruits  Chest:  Clear to auscultation percussion, good respiratory effort, no use of accessory respiratory muscles  CVS:  Regular rate and rhythm without rub   Abdomen:  soft and nontender   Extremities: no significant lower extremity edema  Neuro:  No gross focality  Psych:  Alert , cooperative   Dialysis access: PermCath    Medications:    Current Facility-Administered Medications:     acetaminophen (Ofirmev) injection 1,000 mg, 1,000 mg, Intravenous, Q6H PRN, Lore Revankar, DO, Last Rate: 400 mL/hr at 10/15/24 2122, 1,000 mg at 10/15/24 2122     allopurinol (ZYLOPRIM) tablet 100 mg, 100 mg, Oral, Daily, Lore Revankar, DO, 100 mg at 10/15/24 1240    atorvastatin (LIPITOR) tablet 40 mg, 40 mg, Oral, Daily With Dinner, Lore Revankar, DO, 40 mg at 10/15/24 1624    docusate sodium (COLACE) capsule 100 mg, 100 mg, Oral, BID, Lore Revankar, DO, 100 mg at 10/13/24 0805    escitalopram (LEXAPRO) tablet 10 mg, 10 mg, Oral, Daily, Lore Revankar, DO, 10 mg at 10/15/24 1241    gabapentin (NEURONTIN) capsule 300 mg, 300 mg, Oral, BID, Gennaro Zelaya, DO, 300 mg at 10/15/24 1714    hydrALAZINE (APRESOLINE) injection 10 mg, 10 mg, Intravenous, Q6H PRN, Lore Revankar, DO, 10 mg at 10/10/24 1944    influenza vaccine, high-dose (Fluzone High-Dose) IM injection 0.5 mL, 0.5 mL, Intramuscular, Once PRN, Lore Revankar, DO    insulin lispro (HumALOG/ADMELOG) 100 units/mL subcutaneous injection 1-6 Units, 1-6 Units, Subcutaneous, TID AC **AND** Fingerstick Glucose (POCT), , , TID AC, Lore Revankar, DO    labetalol (NORMODYNE) injection 10 mg, 10 mg, Intravenous, Q4H PRN, Lore Revankar, DO, 10 mg at 10/10/24 1822    NIFEdipine (PROCARDIA XL) 24 hr tablet 60 mg, 60 mg, Oral, After Dinner, Joselyn Reyes Bahamonde, MD, 60 mg at 10/15/24 1714    ondansetron (ZOFRAN) injection 4 mg, 4 mg, Intravenous, Q6H PRN, GRANT Rich, 4 mg at 10/12/24 2128    pantoprazole (PROTONIX) injection 40 mg, 40 mg, Intravenous, Q12H KEMAL, Lore Revankar, DO, 40 mg at 10/15/24 2020    polyethylene glycol (MIRALAX) packet 17 g, 17 g, Oral, Daily PRN, Lore Silver DO, 17 g at 10/10/24 1841    sodium hypochlorite (DAKIN'S HALF-STRENGTH) 0.25 percent topical solution 1 Application, 1 Application, Irrigation, Daily, Lore Silver DO, 1 Application at 10/15/24 1351      Lab Results: I have reviewed the following results:  Results from last 7 days   Lab Units 10/16/24  0355 10/15/24  0831 10/14/24  0614 10/13/24  0958 10/12/24  1509 10/12/24  0435 10/11/24  0510 10/10/24  1638  "10/10/24  0610   WBC Thousand/uL 6.82 6.01 6.19 7.26  --  6.51 6.51  --  6.49   HEMOGLOBIN g/dL 7.8* 7.6* 8.1* 7.8* 7.9* 7.3* 7.9*   < > 6.9*   HEMATOCRIT % 24.4* 23.7* 25.2* 24.3* 24.6* 22.9* 24.1*  --  21.6*   PLATELETS Thousands/uL 210 213 211 215  --  201 207  --  190   POTASSIUM mmol/L 4.1 4.3 4.5 4.1  --  4.8 4.6  --  5.2   CHLORIDE mmol/L 98 99 95* 95*  --  94* 94*  --  99   CO2 mmol/L 27 27 26 26  --  24 26  --  22   BUN mg/dL 25 49* 38* 25  --  40* 30*  --  49*   CREATININE mg/dL 4.62* 7.85* 6.44* 4.93*  --  6.92* 5.25*  --  7.55*   CALCIUM mg/dL 8.2* 7.7* 7.6* 7.7*  --  7.7* 8.4  --  7.5*   MAGNESIUM mg/dL  --  1.9  --   --   --   --  2.0  --  2.1   PHOSPHORUS mg/dL  --   --   --   --   --   --  5.4*  --   --     < > = values in this interval not displayed.       Administrative Statements     Portions of the record may have been created with voice recognition software. Occasional wrong word or \"sound a like\" substitutions may have occurred due to the inherent limitations of voice recognition software. Read the chart carefully and recognize, using context, where substitutions have occurred.If you have any questions, please contact the dictating provider.  "

## 2024-10-16 NOTE — PROGRESS NOTES
Progress Note - Hospitalist   Name: Naresh Carpio 64 y.o. male I MRN: [id number]  Unit/Bed#: [address] [place], [place] [address] I Date of Admission: 10/8/2024   Date of Service: 10/16/2024 I Hospital Day: 7    Assessment & Plan  Toxic metabolic encephalopathy  RR called 10/8 for acute change in mental status with last well known 12:58, stroke alert called 1:16 pm  NIH 8 and decision was made to give TNK after initial CT head negative for bleed  CTA head/neck showed intracranial ICA atherosclerosis - outpatient vascular surgery referral  MRI brain without evidence of acute CVA  Suspect metabolic encephalopathy in setting of ESRD with missed dialysis  Patient back at baseline mentation; encephalopathy resolved   Pending further recommendations from CM regarding placement   ESRD (end stage renal disease) ([facility])  Lab Results   Component Value Date    EGFR 12 10/16/2024    EGFR 6 10/15/2024    EGFR 8 10/14/2024    CREATININE 4.62 (H) 10/16/2024    CREATININE 7.85 (H) 10/15/2024    CREATININE 6.44 (H) 10/14/2024     Patient gets dialysis Tuesday, Thursday, Saturday and has missed dialysis for about a week since being discharged from short-term rehab.  Patient was scheduled for dialysis at a different facility but family stated that they cannot transport him for his appointments  Nephrology following  Continued on TTS schedule  Continue Renagel for hyperphosphatemia  Hematuria (Resolved: 10/16/2024)  Patient initially had gaitan placed for urinary retention, requested it be removed  Noted to have hematuria after gaitan removal with clots  Urinary retention protocol  Aspirin and DVT prophylaxis currently on held  Urology consulted  Patient notes clear urine   AMB follow up with urology   Acute on chronic anemia  Hemoglobin 10.2 on admission, noted to drop to 6.7 after TNK administration  Patient initially declined CT a/p and blood transfusion, eventually agreeable to 1 unit PRBC on 10/10  Urology following for hematuria, urine now clear  Denies  black/bloody bowel movements  Aspirin and DVT prophylaxis currently on hold  Hemoglobin 7.6 this morning  Patient reports he still would like to hold off CT a/p to evaluate for source of bleeding  des 72, TIBC low, B12 and folate within normal limits    Acute anemia resolved   Restart ASA 10/16   Monitor H/H Q12   Type 2 diabetes mellitus, without long-term current use of insulin ([facility])  Lab Results   Component Value Date    HGBA1C 5.5 10/08/2024       Recent Labs     10/15/24  1553 10/15/24  2034 10/16/24  0716 10/16/24  1150   POCGLU 121 123 100 111   SSI  Diabetic diet  Status post fall  CT head, CT c-spine, and XR R knee negative for acute abnormality on admission  PT/OT saw patient again on 10/15 as patient was seen concerns regarding ability to climb 8 steps into the home.  PT indicated that patient would have difficulty navigating stairs due to deconditioning, recommending short-term rehab  Discussed with case management  Fall precautions  PVD (peripheral vascular disease) ([facility])  Continue statin  Hypertension  Continue nifedipine 60 mg p.o. 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)  Lab Results   Component Value Date    EGFR 12 10/16/2024    EGFR 6 10/15/2024    EGFR 8 10/14/2024    CREATININE 4.62 (H) 10/16/2024    CREATININE 7.85 (H) 10/15/2024    CREATININE 6.44 (H) 10/14/2024   Patient underwent dialysis on 10/15, continue TTS schedule    VTE Pharmacologic Prophylaxis: VTE Score: 2 Moderate Risk (Score 3-4) - Pharmacological DVT Prophylaxis Contraindicated. Sequential Compression Devices Ordered. Hematuria , resolved. Restart ASA today . Plan to restart DVT PPE in 24 hrs    Mobility:   Basic Mobility Inpatient Raw Score: 21  JH-HLM Goal: 6: Walk 10 steps or more  JH-HLM Achieved: 6: Walk 10 steps or more  JH-HLM Goal achieved. Continue to encourage appropriate mobility.    Patient Centered Rounds: I performed bedside rounds  with nursing staff today.   Discussions with Specialists or Other Care Team Provider: nephrology, psychiatry    Education and Discussions with Family / Patient:  patient .     Current Length of Stay: 7 day(s)  Current Patient Status: Inpatient   Certification Statement: The patient will continue to require additional inpatient hospital stay due to hematuria , ESRD   Discharge Plan: Anticipate discharge in 24-48 hrs to TBD     Code Status: Level 1 - Full Code    Subjective   Patient was seen today he is alert and oriented x 3 with limitations. At baseline. Cooperative. No active complaints.   Reports light yellow urine , no clots / blood / pus noted.       Objective :  Temp:  [98.1 °F (36.7 °C)-98.7 °F (37.1 °C)] 98.2 °F (36.8 °C)  HR:  [69-74] 69  BP: (134-160)/() 147/64  Resp:  [17-18] 18  SpO2:  [95 %-99 %] 99 %  O2 Device: None (Room air)    Body mass index is 27.9 kg/m².     Input and Output Summary (last 24 hours):     Intake/Output Summary (Last 24 hours) at 10/16/2024 1347  Last data filed at 10/16/2024 0401  Gross per 24 hour   Intake 250 ml   Output 300 ml   Net -50 ml       Physical Exam  Vitals reviewed.   Constitutional:       General: He is not in acute distress.     Appearance: Normal appearance.   HENT:      Head: Normocephalic and atraumatic.      Mouth/Throat:      Mouth: Mucous membranes are moist.   Eyes:      Conjunctiva/sclera: Conjunctivae normal.      Pupils: Pupils are equal, round, and reactive to light.   Cardiovascular:      Rate and Rhythm: Normal rate and regular rhythm.      Pulses: Normal pulses.           Carotid pulses are 2+ on the right side and 2+ on the left side.       Radial pulses are 2+ on the right side and 2+ on the left side.        Dorsalis pedis pulses are 2+ on the right side and 2+ on the left side.      Heart sounds: Normal heart sounds, S1 normal and S2 normal. No murmur heard.  Pulmonary:      Effort: No tachypnea, bradypnea or accessory muscle usage.       Breath sounds: Normal breath sounds and air entry. No decreased breath sounds, wheezing, rhonchi or rales.   Abdominal:      General: Abdomen is flat. Bowel sounds are normal. There is no distension.      Palpations: Abdomen is soft.      Tenderness: There is no abdominal tenderness.   Musculoskeletal:      Right lower leg: No edema.      Left lower leg: No edema.   Neurological:      Mental Status: He is alert and oriented to person, place, and time. Mental status is at baseline.           Lines/Drains:  Lines/Drains/Airways       Active Status       Name Placement date Placement time Site Days    HD Permanent Double Catheter --  --  Internal jugular  --                            Lab Results: I have reviewed the following results:   Results from last 7 days   Lab Units 10/16/24  0355 10/12/24  0435 10/11/24  0510   WBC Thousand/uL 6.82   < > 6.51   HEMOGLOBIN g/dL 7.8*   < > 7.9*   HEMATOCRIT % 24.4*   < > 24.1*   PLATELETS Thousands/uL 210   < > 207   SEGS PCT %  --   --  74   LYMPHO PCT %  --   --  12*   MONO PCT %  --   --  10   EOS PCT %  --   --  4    < > = values in this interval not displayed.     Results from last 7 days   Lab Units 10/16/24  0355   SODIUM mmol/L 134*   POTASSIUM mmol/L 4.1   CHLORIDE mmol/L 98   CO2 mmol/L 27   BUN mg/dL 25   CREATININE mg/dL 4.62*   ANION GAP mmol/L 9   CALCIUM mg/dL 8.2*   GLUCOSE RANDOM mg/dL 106         Results from last 7 days   Lab Units 10/16/24  1150 10/16/24  0716 10/15/24  2034 10/15/24  1553 10/15/24  1142 10/15/24  0727 10/14/24  2012 10/14/24  1602 10/14/24  1115 10/14/24  0736 10/13/24  2048 10/13/24  1609   POC GLUCOSE mg/dl 111 100 123 121 92 107 146* 132 94 112 125 116               Recent Cultures (last 7 days):         Imaging Results Review: No pertinent imaging studies reviewed.  Other Study Results Review: EKG was reviewed.     Last 24 Hours Medication List:     Current Facility-Administered Medications:     acetaminophen (Ofirmev) injection 1,000  mg, Q6H PRN, Last Rate: 1,000 mg (10/15/24 2122)    allopurinol (ZYLOPRIM) tablet 100 mg, Daily    aspirin chewable tablet 81 mg, Daily    atorvastatin (LIPITOR) tablet 40 mg, Daily With Dinner    docusate sodium (COLACE) capsule 100 mg, BID    escitalopram (LEXAPRO) tablet 10 mg, Daily    gabapentin (NEURONTIN) capsule 300 mg, BID    hydrALAZINE (APRESOLINE) injection 10 mg, Q6H PRN    influenza vaccine, high-dose (Fluzone High-Dose) IM injection 0.5 mL, Once PRN    insulin lispro (HumALOG/ADMELOG) 100 units/mL subcutaneous injection 1-6 Units, TID AC **AND** Fingerstick Glucose (POCT), TID AC    labetalol (NORMODYNE) injection 10 mg, Q4H PRN    NIFEdipine (PROCARDIA XL) 24 hr tablet 60 mg, After Dinner    ondansetron (ZOFRAN) injection 4 mg, Q6H PRN    pantoprazole (PROTONIX) injection 40 mg, Q12H KEMAL    polyethylene glycol (MIRALAX) packet 17 g, Daily PRN    sodium hypochlorite (DAKIN'S HALF-STRENGTH) 0.25 percent topical solution 1 Application, Daily    Administrative Statements   Today, Patient Was Seen By: Nirav Bruce MD  I have spent a total time of 30 minutes in caring for this patient on the day of the visit/encounter including Diagnostic results, Prognosis, Risks and benefits of tx options, and Instructions for management.    **Please Note: This note may have been constructed using a voice recognition system.**

## 2024-10-16 NOTE — CASE MANAGEMENT
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, on chronic dialysis ([facility]) 10/14/2024     Falls 10/08/2024     Toxic metabolic encephalopathy 10/08/2024     PVD (peripheral vascular disease) ([facility]) 10/08/2024     Closed fracture of right pelvis ([facility]) 2024     Bilateral sacral fracture, closed ([facility]) 2024     Difficulty with speech 2024     History of amputation of hallux ([facility]) 2024     Hypertensive urgency 2024     Facial cellulitis 2024     Constipation 2023     Bradycardia 2023     Cellulitis of right foot      Polymicrobial bacterial infection 2023     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 mellitus, with necrosis of bone ([facility])      Acquired deformity of foot, right      Charcot's joint      Subacute osteomyelitis of right foot ([facility])      Open wound of right foot 2023     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 mellitus, with bone involvement without evidence of necrosis ([facility])      Diabetic ulcer of left midfoot associated with type 2 diabetes mellitus, limited to breakdown of skin ([facility])      Diabetic polyneuropathy associated with type 2 diabetes mellitus ([facility])      Diabetes mellitus type 2 with peripheral artery disease ([facility])      History of amputation of lesser toe of left foot ([facility])      Onychomycosis      Status post fall 2023     Traumatic rhabdomyolysis ([facility]) 2023     Leukocytosis 2023     Osteoarthritis of left hip 2022     Severe Left Orchalgia 2022     Right shoulder pain 2022     Left hip pain  07/06/2022     Hemodialysis status ([facility])      Hypervolemia      Acute on chronic anemia 01/23/2022     ESRD (end stage renal disease) ([facility]) 01/21/2022     Type 2 diabetes mellitus, without long-term current use of insulin ([facility])      Hypertension      History of TIA (transient ischemic attack)      T10 vertebral fracture ([facility])        LOS (days): 7  Geometric Mean LOS (GMLOS) (days): 3.6  Days to GMLOS:-3.5     OBJECTIVE:  Risk of Unplanned Readmission Score: 27.09       Current admission status: Inpatient   Preferred Pharmacy:   [id number] - [place], [place] - [address]  [facility]  [address] [address]  Phone: [phone] Fax: [phone]    Primary Care Provider: No primary care provider on file.    Primary Insurance: MEDICARE  Secondary Insurance:     DISCHARGE DETAILS:    Discharge planning discussed with:: Patient  Freedom of Choice: Yes    Comments - Freedom of Choice: SW spoke with patient at bedside to discharge planning.  Patient stated that he plans to discharge to home, which is his sister's home.  SW asked if he has spoken with his sister in the past few days and he stated that he has and she is aware of his plan.  Patient wants to continue outpatient HD at [facility] and stated that he has spoken with Natalio at the clinic.  He does not want to transfer to [facility].  He plans to continue driving himself to HD.      CURTIS received a message via the hospital  from Natalio at [facility] [phone] and returned the call but Natalio had left for the day.  CURTIS left contact information and requested a call back tomorrow.       Were Treatment Team discharge recommendations reviewed with patient/caregiver?: Yes  Did patient/caregiver verbalize understanding of patient care needs?: Yes  Were patient/caregiver advised of the risks associated with not following Treatment Team discharge recommendations?: Yes     Requested Home Health Care         Is the  patient interested in HHC at discharge?: No    DME Referral Provided  Referral made for DME?: No     Would you like to participate in our [facility] service program?  : No - Declined    Treatment Team Recommendation: Short Term Rehab  Discharge Destination Plan:: Home

## 2024-10-16 NOTE — ASSESSMENT & PLAN NOTE
Patient initially had gaitan placed for urinary retention, requested it be removed  Noted to have hematuria after gaitan removal with clots  Urinary retention protocol  Aspirin and DVT prophylaxis currently on held  Urology consulted  Patient notes clear urine   AMB follow up with urology

## 2024-10-16 NOTE — ASSESSMENT & PLAN NOTE
Lab Results   Component Value Date    EGFR 12 10/16/2024    EGFR 6 10/15/2024    EGFR 8 10/14/2024    CREATININE 4.62 (H) 10/16/2024    CREATININE 7.85 (H) 10/15/2024    CREATININE 6.44 (H) 10/14/2024     Patient gets dialysis Tuesday, Thursday, Saturday and has missed dialysis for about a week since being discharged from short-term rehab.  Patient was scheduled for dialysis at a different facility but family stated that they cannot transport him for his appointments  Nephrology following  Continued on TTS schedule  Continue Renagel for hyperphosphatemia

## 2024-10-17 ENCOUNTER — APPOINTMENT (INPATIENT)
Dept: DIALYSIS | Facility: HOSPITAL | Age: 64
DRG: 640 | End: 2024-10-17
Attending: FAMILY MEDICINE
Payer: MEDICARE

## 2024-10-17 PROBLEM — G92.8 TOXIC METABOLIC ENCEPHALOPATHY: Status: RESOLVED | Noted: 2024-10-08 | Resolved: 2024-10-17

## 2024-10-17 LAB
AMORPH URATE CRY URNS QL MICRO: ABNORMAL
ANION GAP SERPL CALCULATED.3IONS-SCNC: 14 MMOL/L (ref 4–13)
BACTERIA UR QL AUTO: ABNORMAL /HPF
BILIRUB UR QL STRIP: NEGATIVE
BUN SERPL-MCNC: 40 MG/DL (ref 5–25)
CALCIUM SERPL-MCNC: 8 MG/DL (ref 8.4–10.2)
CHLORIDE SERPL-SCNC: 99 MMOL/L (ref 96–108)
CLARITY UR: ABNORMAL
CO2 SERPL-SCNC: 23 MMOL/L (ref 21–32)
COLOR UR: ABNORMAL
CREAT SERPL-MCNC: 6.25 MG/DL (ref 0.6–1.3)
GFR SERPL CREATININE-BSD FRML MDRD: 8 ML/MIN/1.73SQ M
GLUCOSE SERPL-MCNC: 106 MG/DL (ref 65–140)
GLUCOSE SERPL-MCNC: 110 MG/DL (ref 65–140)
GLUCOSE SERPL-MCNC: 113 MG/DL (ref 65–140)
GLUCOSE SERPL-MCNC: 131 MG/DL (ref 65–140)
GLUCOSE SERPL-MCNC: 151 MG/DL (ref 65–140)
GLUCOSE UR STRIP-MCNC: ABNORMAL MG/DL
HCT VFR BLD AUTO: 24.8 % (ref 36.5–49.3)
HGB BLD-MCNC: 7.7 G/DL (ref 12–17)
HGB UR QL STRIP.AUTO: NEGATIVE
KETONES UR STRIP-MCNC: NEGATIVE MG/DL
LEUKOCYTE ESTERASE UR QL STRIP: ABNORMAL
MAGNESIUM SERPL-MCNC: 1.8 MG/DL (ref 1.9–2.7)
NITRITE UR QL STRIP: NEGATIVE
NON-SQ EPI CELLS URNS QL MICRO: ABNORMAL /HPF
PH UR STRIP.AUTO: 7.5 [PH]
POTASSIUM SERPL-SCNC: 4.4 MMOL/L (ref 3.5–5.3)
PROT UR STRIP-MCNC: ABNORMAL MG/DL
RBC #/AREA URNS AUTO: ABNORMAL /HPF
SODIUM SERPL-SCNC: 136 MMOL/L (ref 135–147)
SP GR UR STRIP.AUTO: 1.01 (ref 1–1.03)
UROBILINOGEN UR STRIP-ACNC: <2 MG/DL
WBC #/AREA URNS AUTO: ABNORMAL /HPF

## 2024-10-17 PROCEDURE — 82948 REAGENT STRIP/BLOOD GLUCOSE: CPT

## 2024-10-17 PROCEDURE — 81001 URINALYSIS AUTO W/SCOPE: CPT

## 2024-10-17 PROCEDURE — 83735 ASSAY OF MAGNESIUM: CPT

## 2024-10-17 PROCEDURE — 97530 THERAPEUTIC ACTIVITIES: CPT

## 2024-10-17 PROCEDURE — 80048 BASIC METABOLIC PNL TOTAL CA: CPT

## 2024-10-17 PROCEDURE — 99232 SBSQ HOSP IP/OBS MODERATE 35: CPT | Performed by: STUDENT IN AN ORGANIZED HEALTH CARE EDUCATION/TRAINING PROGRAM

## 2024-10-17 PROCEDURE — 99232 SBSQ HOSP IP/OBS MODERATE 35: CPT

## 2024-10-17 PROCEDURE — 85018 HEMOGLOBIN: CPT

## 2024-10-17 PROCEDURE — 85014 HEMATOCRIT: CPT

## 2024-10-17 RX ADMIN — ASPIRIN 81 MG CHEWABLE TABLET 81 MG: 81 TABLET CHEWABLE at 09:13

## 2024-10-17 RX ADMIN — PANTOPRAZOLE SODIUM 40 MG: 40 INJECTION, POWDER, FOR SOLUTION INTRAVENOUS at 09:13

## 2024-10-17 RX ADMIN — PANTOPRAZOLE SODIUM 40 MG: 40 INJECTION, POWDER, FOR SOLUTION INTRAVENOUS at 20:05

## 2024-10-17 RX ADMIN — HEPARIN SODIUM 5000 UNITS: 5000 INJECTION, SOLUTION INTRAVENOUS; SUBCUTANEOUS at 21:55

## 2024-10-17 RX ADMIN — ATORVASTATIN CALCIUM 40 MG: 40 TABLET, FILM COATED ORAL at 17:05

## 2024-10-17 RX ADMIN — HEPARIN SODIUM 5000 UNITS: 5000 INJECTION, SOLUTION INTRAVENOUS; SUBCUTANEOUS at 14:49

## 2024-10-17 RX ADMIN — NIFEDIPINE 60 MG: 30 TABLET, EXTENDED RELEASE ORAL at 18:30

## 2024-10-17 RX ADMIN — DOCUSATE SODIUM 100 MG: 100 CAPSULE, LIQUID FILLED ORAL at 17:05

## 2024-10-17 RX ADMIN — ESCITALOPRAM OXALATE 10 MG: 10 TABLET ORAL at 09:13

## 2024-10-17 RX ADMIN — GABAPENTIN 300 MG: 300 CAPSULE ORAL at 17:05

## 2024-10-17 RX ADMIN — GABAPENTIN 300 MG: 300 CAPSULE ORAL at 09:13

## 2024-10-17 RX ADMIN — HYOSCYAMINE SULFATE 1 APPLICATION: 16 SOLUTION at 18:48

## 2024-10-17 RX ADMIN — EPOETIN ALFA 3000 UNITS: 3000 SOLUTION INTRAVENOUS; SUBCUTANEOUS at 15:32

## 2024-10-17 RX ADMIN — ALLOPURINOL 100 MG: 100 TABLET ORAL at 09:13

## 2024-10-17 NOTE — PROGRESS NOTES
Patient examined spoke to the nurse care coordinators notes reviewed and noted.  Patient seems to be doing better he is improving overall creatinine is better but it is ups and downs yesterday's creatinine was better than today.  Patient's medical treatment is in progress he is on dialysis patient is able to verbalize the need for follow-up medical care including regular hemodialysis after the discharge he is not confused he is fully aware that if he does not comply with the needed medical care he can get very sick.  Patient is suffering from multiple medical problems he is depressed but no suicidal ideation no psychosis not agitated offers no new complaints.  I reviewed the medical progress notes.  Once patient is medically stable he will like to go home he does have support from the sister and brother-in-law.  No behavioral problems reported or noted.  He is able to express his feelings well.  Not suicidal.  I will continue his medications the same and supportive therapy.

## 2024-10-17 NOTE — PROGRESS NOTES
Progress Note - Hospitalist   Name: Naresh Carpio 64 y.o. male I MRN: [id number]  Unit/Bed#: [address] [place], [place] [address] I Date of Admission: 10/8/2024   Date of Service: 10/17/2024 I Hospital Day: 8    Assessment & Plan  Toxic metabolic encephalopathy (Resolved: 10/17/2024)  RR called 10/8 for acute change in mental status with last well known 12:58, stroke alert called 1:16 pm  NIH 8 and decision was made to give TNK after initial CT head negative for bleed  CTA head/neck showed intracranial ICA atherosclerosis - outpatient vascular surgery referral  MRI brain without evidence of acute CVA  Suspect metabolic encephalopathy in setting of ESRD with missed dialysis  Patient back at baseline mentation; encephalopathy resolved   Pending further recommendations from CM regarding placement   ESRD (end stage renal disease) (HCC)  Lab Results   Component Value Date    EGFR 8 10/17/2024    EGFR 12 10/16/2024    EGFR 6 10/15/2024    CREATININE 6.25 (H) 10/17/2024    CREATININE 4.62 (H) 10/16/2024    CREATININE 7.85 (H) 10/15/2024     Patient gets dialysis Tuesday, Thursday, Saturday and has missed dialysis for about a week since being discharged from short-term rehab.  Patient was scheduled for dialysis at a different facility but family stated that they cannot transport him for his appointments  Nephrology following  Continued on TTS schedule  Continue Renagel for hyperphosphatemia  Plan for extra HD session today per nephrology   Pending further recommendations from CM given patient and family are considering different outpatient dialysis centers.   Acute on chronic anemia  Hemoglobin 10.2 on admission, noted to drop to 6.7 after TNK administration  Patient initially declined CT a/p and blood transfusion, eventually agreeable to 1 unit PRBC on 10/10  Urology following for hematuria, urine now clear  Denies black/bloody bowel movements  Aspirin and DVT prophylaxis currently on hold  Hemoglobin 7.6 this morning  Patient reports he  still would like to hold off CT a/p to evaluate for source of bleeding  des 72, TIBC low, B12 and folate within normal limits    Acute anemia resolved   Restart ASA 10/16 successfully   Monitor H/H Q12   Recheck UA   Urology with plan for outpatient cystoscopy unless hematuria recurs   Type 2 diabetes mellitus, without long-term current use of insulin (Formerly [facility])  Lab Results   Component Value Date    HGBA1C 5.5 10/08/2024       Recent Labs     10/16/24  1615 10/16/24  2034 10/17/24  0725 10/17/24  1106   POCGLU 135 141* 110 113   SSI  Diabetic diet  Status post fall  CT head, CT c-spine, and XR R knee negative for acute abnormality on admission  PT/OT saw patient again on 10/15 as patient was seen concerns regarding ability to climb 8 steps into the home.  PT indicated that patient would have difficulty navigating stairs due to deconditioning, recommending short-term rehab  Discussed with case management  Fall precautions  PVD (peripheral vascular disease) (Formerly [facility])  Continue statin  Hypertension  Continue nifedipine 60 mg p.o. 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, on chronic dialysis (Formerly [facility])  Lab Results   Component Value Date    EGFR 8 10/17/2024    EGFR 12 10/16/2024    EGFR 6 10/15/2024    CREATININE 6.25 (H) 10/17/2024    CREATININE 4.62 (H) 10/16/2024    CREATININE 7.85 (H) 10/15/2024   Patient underwent dialysis on 10/15, continue TTS schedule  Hematuria  Patient initially had gaitan placed for urinary retention, requested it be removed  Noted to have hematuria after gaitan removal with clots  Urinary retention protocol  Aspirin and DVT prophylaxis were held.   Urology consulted  Patient notes clear urine since 10/16 atleast.   Restart ASA and DVT PPE   Stable H and H   Recheck UA per urology recommendations     AMB follow up with urology for cystoscopy   Hematuria resolved      VTE Pharmacologic Prophylaxis: VTE Score: 2 Moderate Risk (Score 3-4) -  Pharmacological DVT Prophylaxis Ordered: heparin.    Mobility:   Basic Mobility Inpatient Raw Score: 21  JH-HLM Goal: 6: Walk 10 steps or more  JH-HLM Achieved: 6: Walk 10 steps or more  JH-HLM Goal achieved. Continue to encourage appropriate mobility.    Patient Centered Rounds: I performed bedside rounds with nursing staff today.   Discussions with Specialists or Other Care Team Provider: urology, nephrology     Education and Discussions with Family / Patient:  patient .     Current Length of Stay: 8 day(s)  Current Patient Status: Inpatient   Certification Statement: The patient will continue to require additional inpatient hospital stay due to acute anemia , ESRD   Discharge Plan: Anticipate discharge in 24-48 hrs to home.    Code Status: Level 1 - Full Code    Subjective   Patient was seen today at bedside . Reports feeling well.   No chest pain or tightness, SOB or cough, dizziness or light headedness, N/V, Diarrhea of constipation.   No active urinary symptoms  Tolerating oral diet.   Reports no hematuria       Objective :  Temp:  [98.3 °F (36.8 °C)-99.2 °F (37.3 °C)] 98.3 °F (36.8 °C)  HR:  [66-72] 71  BP: (153-168)/(60-65) 159/62  Resp:  [17-20] 17  SpO2:  [94 %-97 %] 95 %  O2 Device: None (Room air)    Body mass index is 28.66 kg/m².     Input and Output Summary (last 24 hours):     Intake/Output Summary (Last 24 hours) at 10/17/2024 1143  Last data filed at 10/17/2024 0500  Gross per 24 hour   Intake 110 ml   Output 500 ml   Net -390 ml       Physical Exam  Vitals reviewed.   Constitutional:       General: He is not in acute distress.     Appearance: Normal appearance.   HENT:      Head: Normocephalic and atraumatic.      Mouth/Throat:      Mouth: Mucous membranes are moist.   Eyes:      Conjunctiva/sclera: Conjunctivae normal.      Pupils: Pupils are equal, round, and reactive to light.   Cardiovascular:      Rate and Rhythm: Normal rate and regular rhythm.      Pulses: Normal pulses.           Carotid  pulses are 2+ on the right side and 2+ on the left side.       Radial pulses are 2+ on the right side and 2+ on the left side.        Dorsalis pedis pulses are 2+ on the right side and 2+ on the left side.      Heart sounds: Normal heart sounds, S1 normal and S2 normal. No murmur heard.  Pulmonary:      Effort: No tachypnea, bradypnea or accessory muscle usage.      Breath sounds: Normal breath sounds and air entry. No decreased breath sounds, wheezing, rhonchi or rales.   Abdominal:      General: Abdomen is flat. Bowel sounds are normal. There is no distension.      Palpations: Abdomen is soft.      Tenderness: There is no abdominal tenderness.   Musculoskeletal:      Right lower le+ Pitting Edema present.      Left lower le+ Pitting Edema present.   Neurological:      Mental Status: He is alert and oriented to person, place, and time. Mental status is at baseline.   Psychiatric:         Mood and Affect: Mood normal.         Behavior: Behavior normal.           Lines/Drains:  Lines/Drains/Airways       Active Status       Name Placement date Placement time Site Days    HD Permanent Double Catheter --  --  Internal jugular  --                            Lab Results: I have reviewed the following results:   Results from last 7 days   Lab Units 10/17/24  0752 10/16/24  2005 10/16/24  0355 10/12/24  0435 10/11/24  0510   WBC Thousand/uL  --   --  6.82   < > 6.51   HEMOGLOBIN g/dL 7.7*   < > 7.8*   < > 7.9*   HEMATOCRIT % 24.8*   < > 24.4*   < > 24.1*   PLATELETS Thousands/uL  --   --  210   < > 207   SEGS PCT %  --   --   --   --  74   LYMPHO PCT %  --   --   --   --  12*   MONO PCT %  --   --   --   --  10   EOS PCT %  --   --   --   --  4    < > = values in this interval not displayed.     Results from last 7 days   Lab Units 10/17/24  0752   SODIUM mmol/L 136   POTASSIUM mmol/L 4.4   CHLORIDE mmol/L 99   CO2 mmol/L 23   BUN mg/dL 40*   CREATININE mg/dL 6.25*   ANION GAP mmol/L 14*   CALCIUM mg/dL 8.0*    GLUCOSE RANDOM mg/dL 106         Results from last 7 days   Lab Units 10/17/24  1106 10/17/24  0725 10/16/24  2034 10/16/24  1615 10/16/24  1150 10/16/24  0716 10/15/24  2034 10/15/24  1553 10/15/24  1142 10/15/24  0727 10/14/24  2012 10/14/24  1602   POC GLUCOSE mg/dl 113 110 141* 135 111 100 123 121 92 107 146* 132               Recent Cultures (last 7 days):         Imaging Results Review: No pertinent imaging studies reviewed.  Other Study Results Review: EKG was reviewed.     Last 24 Hours Medication List:     Current Facility-Administered Medications:     acetaminophen (Ofirmev) injection 1,000 mg, Q6H PRN, Last Rate: 1,000 mg (10/15/24 2122)    allopurinol (ZYLOPRIM) tablet 100 mg, Daily    aspirin chewable tablet 81 mg, Daily    atorvastatin (LIPITOR) tablet 40 mg, Daily With Dinner    docusate sodium (COLACE) capsule 100 mg, BID    epoetin jessica (EPOGEN,PROCRIT) injection 3,000 Units, Once per day on Tuesday Thursday Saturday    escitalopram (LEXAPRO) tablet 10 mg, Daily    gabapentin (NEURONTIN) capsule 300 mg, BID    heparin (porcine) subcutaneous injection 5,000 Units, Q8H KEMAL    hydrALAZINE (APRESOLINE) injection 10 mg, Q6H PRN    influenza vaccine, high-dose (Fluzone High-Dose) IM injection 0.5 mL, Once PRN    insulin lispro (HumALOG/ADMELOG) 100 units/mL subcutaneous injection 1-6 Units, TID AC **AND** Fingerstick Glucose (POCT), TID AC    labetalol (NORMODYNE) injection 10 mg, Q4H PRN    NIFEdipine (PROCARDIA XL) 24 hr tablet 60 mg, After Dinner    ondansetron (ZOFRAN) injection 4 mg, Q6H PRN    pantoprazole (PROTONIX) injection 40 mg, Q12H KEMAL    polyethylene glycol (MIRALAX) packet 17 g, Daily PRN    sodium hypochlorite (DAKIN'S HALF-STRENGTH) 0.25 percent topical solution 1 Application, Daily    Administrative Statements   Today, Patient Was Seen By: Nirav Bruce MD  I have spent a total time of 30 minutes in caring for this patient on the day of the visit/encounter including Diagnostic  results, Prognosis, Risks and benefits of tx options, and Instructions for management.    **Please Note: This note may have been constructed using a voice recognition system.**

## 2024-10-17 NOTE — PLAN OF CARE
Problem: Potential for Falls  Goal: Patient will remain free of falls  Description: INTERVENTIONS:  - Educate patient/family on patient safety including physical limitations  - Instruct patient to call for assistance with activity   - Consult OT/PT to assist with strengthening/mobility   - Keep Call bell within reach  - Keep bed low and locked with side rails adjusted as appropriate  - Keep care items and personal belongings within reach  - Initiate and maintain comfort rounds  - Make Fall Risk Sign visible to staff  - Offer Toileting every 2 Hours, in advance of need  - Initiate/Maintain bed alarm  - Obtain necessary fall risk management equipment: fall risk sign on door  - Apply yellow socks and bracelet for high fall risk patients  - Consider moving patient to room near nurses station  Outcome: Progressing     Problem: METABOLIC, FLUID AND ELECTROLYTES - ADULT  Goal: Electrolytes maintained within normal limits  Description: INTERVENTIONS:  - Monitor labs and assess patient for signs and symptoms of electrolyte imbalances  - Administer electrolyte replacement as ordered  - Monitor response to electrolyte replacements, including repeat lab results as appropriate  - Instruct patient on fluid and nutrition as appropriate  Outcome: Progressing

## 2024-10-17 NOTE — ASSESSMENT & PLAN NOTE
Hemoglobin 10.2 on admission, noted to drop to 6.7 after TNK administration  Patient initially declined CT a/p and blood transfusion, eventually agreeable to 1 unit PRBC on 10/10  Urology following for hematuria, urine now clear  Denies black/bloody bowel movements  Aspirin and DVT prophylaxis currently on hold  Hemoglobin 7.6 this morning  Patient reports he still would like to hold off CT a/p to evaluate for source of bleeding  des 72, TIBC low, B12 and folate within normal limits    Acute anemia resolved   Restart ASA 10/16 successfully   Monitor H/H Q12   Recheck UA   Urology with plan for outpatient cystoscopy unless hematuria recurs

## 2024-10-17 NOTE — ASSESSMENT & PLAN NOTE
Lab Results   Component Value Date    EGFR 12 10/16/2024    EGFR 6 10/15/2024    EGFR 8 10/14/2024    CREATININE 4.62 (H) 10/16/2024    CREATININE 7.85 (H) 10/15/2024    CREATININE 6.44 (H) 10/14/2024   ESRD on HD TTS:  Dialysis unit/days:[facility]   Access: PermCath  UF on last dialysis 1 L   Plan for 2 L today on dialysis  Renal Diet  Fluid restriction  1.8 L  Adjust medications to GFR<10  Avoid opioids   Continue HD as per schedule TTS

## 2024-10-17 NOTE — ASSESSMENT & PLAN NOTE
Lab Results   Component Value Date    HGBA1C 5.5 10/08/2024       Recent Labs     10/16/24  1150 10/16/24  1615 10/16/24  2034 10/17/24  0725   POCGLU 111 135 141* 110       Blood Sugar Average: Last 72 hrs:  (P) [id number]    HbA1c 5.5  Glucose well-controlled  Continue with insulin  Maintain healthy diet (vegetables, fruits, whole grains, nonfat or low fat)  Weight loss  Physical activity (5 to 10 minutes to start the increase to 30 min a day)

## 2024-10-17 NOTE — ASSESSMENT & PLAN NOTE
Current hemoglobin: 7.6 mg/dL  Multifactorial secondary to ESRD   Treatment:  Epogen on HD today 3000   transfuse for hemoglobin less than 7.0 per primary service

## 2024-10-17 NOTE — ASSESSMENT & PLAN NOTE
Lab Results   Component Value Date    EGFR 8 10/17/2024    EGFR 12 10/16/2024    EGFR 6 10/15/2024    CREATININE 6.25 (H) 10/17/2024    CREATININE 4.62 (H) 10/16/2024    CREATININE 7.85 (H) 10/15/2024     Patient gets dialysis Tuesday, Thursday, Saturday and has missed dialysis for about a week since being discharged from short-term rehab.  Patient was scheduled for dialysis at a different facility but family stated that they cannot transport him for his appointments  Nephrology following  Continued on TTS schedule  Continue Renagel for hyperphosphatemia  Plan for extra HD session today per nephrology   Pending further recommendations from CM given patient and family are considering different outpatient dialysis centers.

## 2024-10-17 NOTE — ASSESSMENT & PLAN NOTE
Lab Results   Component Value Date    HGBA1C 5.5 10/08/2024       Recent Labs     10/16/24  1615 10/16/24  2034 10/17/24  0725 10/17/24  1106   POCGLU 135 141* 110 113   SSI  Diabetic diet

## 2024-10-17 NOTE — ASSESSMENT & PLAN NOTE
Patient initially had gaitan placed for urinary retention, requested it be removed  Noted to have hematuria after gaitan removal with clots  Urinary retention protocol  Aspirin and DVT prophylaxis were held.   Urology consulted  Patient notes clear urine since 10/16 atleast.   Restart ASA and DVT PPE   Stable H and H   Recheck UA per urology recommendations     AMB follow up with urology for cystoscopy   Hematuria resolved

## 2024-10-17 NOTE — PROGRESS NOTES
"Progress Note - Urology      Patient: Naresh Carpio   : 1960 Sex: male   MRN: [id number]     CSN: [id number]  Unit/Bed#: [address] [place] [address]     SUBJECTIVE:   Patient seen on afternoon rounds  Hematuria resolved after catheter removed  May be traumatic [place]  Will follow-up in the office      Objective   Vitals: /62   Pulse 71   Temp 98.3 °F (36.8 °C)   Resp 17   Ht 6' 1\" (1.854 m)   Wt 98.5 kg (217 lb 3.2 oz)   SpO2 95%   BMI 28.66 kg/m²     I/O last 24 hours:  In: 110 [P.O.:100; I.V.:10]  Out: 500 [Urine:500]      Physical Exam:   General Alert awake   Normocephalic atraumatic PERRLA  Lungs clear bilaterally  Cardiac normal S1 normal S2  Abdomen soft, flank pain  Extremities no edema      Lab Results: CBC:   Lab Results   Component Value Date    WBC 6.82 10/16/2024    HGB 7.7 (L) 10/17/2024    HCT 24.8 (L) 10/17/2024    MCV 93 10/16/2024     10/16/2024    RBC 2.63 (L) 10/16/2024    MCH 29.7 10/16/2024    MCHC 32.0 10/16/2024    RDW 16.1 (H) 10/16/2024    MPV 9.3 10/16/2024    NRBC 0 10/11/2024     CMP:   Lab Results   Component Value Date    CL 99 10/17/2024    CO2 23 10/17/2024    BUN 40 (H) 10/17/2024    CREATININE 6.25 (H) 10/17/2024    CALCIUM 8.0 (L) 10/17/2024    AST 25 10/08/2024    ALT 39 10/08/2024    ALKPHOS 100 10/08/2024    EGFR 8 10/17/2024     Urinalysis:   Lab Results   Component Value Date    COLORU Yellow 2023    CLARITYU Clear 2023    SPECGRAV 1.010 2023    PHUR 7.0 2023    LEUKOCYTESUR Negative 2023    NITRITE Negative 2023    GLUCOSEU 100 (1/10%) (A) 2023    KETONESU Negative 2023    BILIRUBINUR Negative 2023    BLOODU Small (A) 2023     Urine Culture: No results found for: \"URINECX\"  PSA: No results found for: \"PSA\"      Assessment/ Plan:  Chronic renal failure dialysis 3 times a week  Gross hematuria  Patient feels great wishes to follow-up in the office to check urine for possible cystoscopy if hematuria " continues          Pete Rivera MD

## 2024-10-17 NOTE — ASSESSMENT & PLAN NOTE
Lab Results   Component Value Date    EGFR 8 10/17/2024    EGFR 12 10/16/2024    EGFR 6 10/15/2024    CREATININE 6.25 (H) 10/17/2024    CREATININE 4.62 (H) 10/16/2024    CREATININE 7.85 (H) 10/15/2024   Patient underwent dialysis on 10/15, continue TTS schedule

## 2024-10-17 NOTE — PHYSICAL THERAPY NOTE
PT TREATMENT       10/17/24 1150   PT Last Visit   PT Visit Date 10/17/24   Note Type   Note Type Treatment   Pain Assessment   Pain Assessment Tool 0-10   Pain Score No Pain   Patient's Stated Pain Goal No pain   Multiple Pain Sites No   Restrictions/Precautions   Weight Bearing Precautions Per Order No   Other Precautions Bed Alarm;Chair Alarm;Fall Risk;Pain   General   Chart Reviewed Yes   Family/Caregiver Present No   Cognition   Overall Cognitive Status WFL   Arousal/Participation Cooperative   Orientation Level Oriented X4   Following Commands Follows all commands and directions without difficulty   Subjective   Subjective Pt agreeable to PT session this AM.   Bed Mobility   Supine to Sit 7  Independent   Sit to Supine 7  Independent   Transfers   Sit to Stand 5  Supervision   Stand to Sit 5  Supervision   Ambulation/Elevation   Gait pattern Foward flexed;Short stride;Step through pattern   Gait Assistance 5  Supervision   Additional items Verbal cues   Assistive Device Rolling walker   Distance 60 feet x 2   Stair Management Assistance 4  Minimal assist  (progressing to supervision)   Additional items Assist x 1;Verbal cues   Stair Management Technique Two rails;Step to pattern   Number of Stairs 12  (step up/down on bottom step)   Balance   Static Sitting Normal   Dynamic Sitting Good   Static Standing Fair +   Dynamic Standing Fair   Ambulatory Fair   Activity Tolerance   Activity Tolerance Patient limited by fatigue   Nurse Made Aware yes CURLY Bernal   Exercises   Neuro re-ed HEP reviewed ; pt reports he has been performing exercise throughout the morning   Assessment   Prognosis Good   Problem List Decreased strength;Decreased endurance;Impaired balance;Decreased mobility;Decreased safety awareness;Pain;Decreased skin integrity   Assessment Pt seen for PT session this AM. Able to progress ambulation x increased distance with decreased level of assist using RW. Able to progress to negotiate x 12 steps  up/down with 2 rails ; discussed home setup with use of cane + rail but pt declined, reports option for wall/roofline on other side for steps. Repositioned at EOB with lunch + all needs within reach.  The patient's AM-PAC Basic Mobility Inpatient Short Form Raw Score is 22. A Raw score of greater than 16 suggests the patient may benefit from discharge to home. Please also refer to the recommendation of the Physical Therapist for safe discharge planning.     Goals   Patient Goals to get stronger   Plan   Treatment/Interventions ADL retraining;Functional transfer training;LE strengthening/ROM;Therapeutic exercise;Endurance training;Gait training;Spoke to nursing   Progress Progressing toward goals   PT Frequency Other (Comment)  (5x/week)   Discharge Recommendation   Rehab Resource Intensity Level, PT III (Minimum Resource Intensity)   AM-PAC Basic Mobility Inpatient   Turning in Flat Bed Without Bedrails 4   Lying on Back to Sitting on Edge of Flat Bed Without Bedrails 4   Moving Bed to Chair 4   Standing Up From Chair Using Arms 4   Walk in Room 3   Climb 3-5 Stairs With Railing 3   Basic Mobility Inpatient Raw Score 22   Basic Mobility Standardized Score 47.4   [facility] Highest Level Of Mobility   -HLM Goal 7: Walk 25 feet or more   -HLM Achieved 7: Walk 25 feet or more   Education   Education Provided Assistive device;Mobility training   Patient Demonstrates verbal understanding   End of Consult   Patient Position at End of Consult Seated edge of bed;All needs within reach   Licensure   [place] License Number  Lizeth Gill [id number]

## 2024-10-17 NOTE — PROGRESS NOTES
Progress Note - Nephrology   Name: Naresh Carpio 64 y.o. male I MRN: [id number]  Unit/Bed#: 4 [place] 414-01 I Date of Admission: 10/8/2024   Date of Service: 10/17/2024 I Hospital Day: 8     Assessment & Plan  ESRD (end stage renal disease) ([facility])  Lab Results   Component Value Date    EGFR 12 10/16/2024    EGFR 6 10/15/2024    EGFR 8 10/14/2024    CREATININE 4.62 (H) 10/16/2024    CREATININE 7.85 (H) 10/15/2024    CREATININE 6.44 (H) 10/14/2024   ESRD on HD TTS:  Dialysis unit/days:[facility]   Access: PermCath  UF on last dialysis 1 L   Plan for 2 L today on dialysis  Renal Diet  Fluid restriction  1.8 L  Adjust medications to GFR<10  Avoid opioids   Continue HD as per schedule TTS        Type 2 diabetes mellitus, without long-term current use of insulin ([facility])  Lab Results   Component Value Date    HGBA1C 5.5 10/08/2024       Recent Labs     10/16/24  1150 10/16/24  1615 10/16/24  2034 10/17/24  0725   POCGLU 111 135 141* 110       Blood Sugar Average: Last 72 hrs:  (P) [id number]    HbA1c 5.5  Glucose well-controlled  Continue with insulin  Maintain healthy diet (vegetables, fruits, whole grains, nonfat or low fat)  Weight loss  Physical activity (5 to 10 minutes to start the increase to 30 min a day)    Acute on chronic anemia  Current hemoglobin: 7.6 mg/dL  Multifactorial secondary to ESRD   Treatment:  Epogen on HD today 3000   transfuse for hemoglobin less than 7.0 per primary service      Status post fall  PT OT  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, on chronic dialysis ([facility])  Lab Results   Component Value Date    EGFR 12 10/16/2024    EGFR 6 10/15/2024    EGFR 8 10/14/2024    CREATININE 4.62 (H) 10/16/2024    CREATININE 7.85 (H) 10/15/2024    CREATININE 6.44 (H) 10/14/2024   Serum calcium 8.2 mg/dL   phosphorus at goal 5.4  No indication of binders at this time  Hypertension  Volume: Hypervolemic  Blood pressure: Hypertensive, /60  Continue  with low-sodium diet and fluid restriction 1.5 to 1.8 L   Fluid restriction as above  UF on HD today  Continue with nifedipine 60 mg     I have reviewed the nephrology recommendations including hemodialysis today with ultrafiltration, with primary team, and we are in agreement with renal plan including the information outlined above. I have discussed the above management plan in detail with the primary service.     Subjective   Brief History of Admission - 63 yo man with PMH of ESRD on HD p/ after a fall. Nephrology is consulted for management of ESRD     Patient feels fatigued, no shortness of breath, no chest pain.    Objective :  Temp:  [98.2 °F (36.8 °C)-99.2 °F (37.3 °C)] 98.9 °F (37.2 °C)  HR:  [66-72] 72  BP: (147-168)/(60-65) 159/60  Resp:  [18-20] 20  SpO2:  [94 %-99 %] 94 %  O2 Device: None (Room air)    Current Weight: Weight - Scale: 98.5 kg (217 lb 3.2 oz)  First Weight: Weight - Scale: 112 kg (245 lb 13 oz)  I/O         10/15 0701  10/16 0700 10/16 0701  10/17 0700    P.O. 250     I.V. (mL/kg) 500 (5.2)     Total Intake(mL/kg) 750 (7.8)     Urine (mL/kg/hr) 700 (0.3)     Other 1467     Total Output 2167     Net -1417                 Physical Exam  General:  no acute distress at this time  Skin:  No acute rash  Eyes:  No scleral icterus and noninjected  ENT:  mucous membranes moist  Neck:  no carotid bruits  Chest:  Clear to auscultation percussion, good respiratory effort, no use of accessory respiratory muscles  CVS:  Regular rate and rhythm without rub   Abdomen:  soft and nontender   Extremities: no significant lower extremity edema  Neuro:  No gross focality  Psych:  Alert , cooperative   Dialysis access: PermCath    Medications:    Current Facility-Administered Medications:     acetaminophen (Ofirmev) injection 1,000 mg, 1,000 mg, Intravenous, Q6H PRN, Lore Revdelar, DO, Last Rate: 400 mL/hr at 10/15/24 2122, 1,000 mg at 10/15/24 2122    allopurinol (ZYLOPRIM) tablet 100 mg, 100 mg, Oral, Daily,  Lore Revankar, DO, 100 mg at 10/16/24 1056    aspirin chewable tablet 81 mg, 81 mg, Oral, Daily, Nirav Bruce MD, 81 mg at 10/16/24 1056    atorvastatin (LIPITOR) tablet 40 mg, 40 mg, Oral, Daily With Dinner, Lore Amadorar DO, 40 mg at 10/16/24 1715    docusate sodium (COLACE) capsule 100 mg, 100 mg, Oral, BID, Lore Nascimentoankar, DO, 100 mg at 10/13/24 0805    epoetin jessica (EPOGEN,PROCRIT) injection 3,000 Units, 3,000 Units, Intravenous, Once per day on Tuesday Thursday Saturday, Joselyn Reyes Bahamonde, MD    escitalopram (LEXAPRO) tablet 10 mg, 10 mg, Oral, Daily, Lore Amadorar, DO, 10 mg at 10/16/24 1056    gabapentin (NEURONTIN) capsule 300 mg, 300 mg, Oral, BID, Gennaro Zelaya, DO, 300 mg at 10/16/24 1800    [Held by provider] heparin (porcine) subcutaneous injection 5,000 Units, 5,000 Units, Subcutaneous, Q8H KEMAL, Nirav Bruce MD    hydrALAZINE (APRESOLINE) injection 10 mg, 10 mg, Intravenous, Q6H PRN, Lore Silver DO, 10 mg at 10/10/24 1944    influenza vaccine, high-dose (Fluzone High-Dose) IM injection 0.5 mL, 0.5 mL, Intramuscular, Once PRN, Lore Amadorar,     insulin lispro (HumALOG/ADMELOG) 100 units/mL subcutaneous injection 1-6 Units, 1-6 Units, Subcutaneous, TID AC **AND** Fingerstick Glucose (POCT), , , TID AC, Lore Amadorar, DO    labetalol (NORMODYNE) injection 10 mg, 10 mg, Intravenous, Q4H PRN, Lore Amadorar DO, 10 mg at 10/10/24 1822    NIFEdipine (PROCARDIA XL) 24 hr tablet 60 mg, 60 mg, Oral, After Dinner, Joselyn Reyes Bahamonde, MD, 60 mg at 10/16/24 1800    ondansetron (ZOFRAN) injection 4 mg, 4 mg, Intravenous, Q6H PRN, GRANT Rich, 4 mg at 10/12/24 2128    pantoprazole (PROTONIX) injection 40 mg, 40 mg, Intravenous, Q12H KEMAL, Lore Silver DO, 40 mg at 10/16/24 2019    polyethylene glycol (MIRALAX) packet 17 g, 17 g, Oral, Daily PRN, Lore Silver DO, 17 g at 10/10/24 1841    sodium hypochlorite (DAKIN'S HALF-STRENGTH) 0.25 percent topical  "solution 1 Application, 1 Application, Irrigation, Daily, Lore Silver, DO, 1 Application at 10/16/24 1056      Lab Results: I have reviewed the following results:  Results from last 7 days   Lab Units 10/16/24  2005 10/16/24  0355 10/15/24  0831 10/14/24  0614 10/13/24  0958 10/12/24  1509 10/12/24  0435 10/11/24  0510   WBC Thousand/uL  --  6.82 6.01 6.19 7.26  --  6.51 6.51   HEMOGLOBIN g/dL 7.6* 7.8* 7.6* 8.1* 7.8* 7.9* 7.3* 7.9*   HEMATOCRIT % 24.4* 24.4* 23.7* 25.2* 24.3* 24.6* 22.9* 24.1*   PLATELETS Thousands/uL  --  210 213 211 215  --  201 207   POTASSIUM mmol/L  --  4.1 4.3 4.5 4.1  --  4.8 4.6   CHLORIDE mmol/L  --  98 99 95* 95*  --  94* 94*   CO2 mmol/L  --  27 27 26 26  --  24 26   BUN mg/dL  --  25 49* 38* 25  --  40* 30*   CREATININE mg/dL  --  4.62* 7.85* 6.44* 4.93*  --  6.92* 5.25*   CALCIUM mg/dL  --  8.2* 7.7* 7.6* 7.7*  --  7.7* 8.4   MAGNESIUM mg/dL  --   --  1.9  --   --   --   --  2.0   PHOSPHORUS mg/dL  --   --   --   --   --   --   --  5.4*       Administrative Statements     Portions of the record may have been created with voice recognition software. Occasional wrong word or \"sound a like\" substitutions may have occurred due to the inherent limitations of voice recognition software. Read the chart carefully and recognize, using context, where substitutions have occurred.If you have any questions, please contact the dictating provider.  "

## 2024-10-17 NOTE — CASE MANAGEMENT
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, on chronic dialysis ([facility]) 10/14/2024     Hematuria 10/10/2024     Falls 10/08/2024     PVD (peripheral vascular disease) ([facility]) 10/08/2024     Closed fracture of right pelvis ([facility]) 2024     Bilateral sacral fracture, closed ([facility]) 2024     Difficulty with speech 2024     History of amputation of hallux ([facility]) 2024     Hypertensive urgency 2024     Facial cellulitis 2024     Constipation 2023     Bradycardia 2023     Cellulitis of right foot      Polymicrobial bacterial infection 2023     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 mellitus, with necrosis of bone ([facility])      Acquired deformity of foot, right      Charcot's joint      Subacute osteomyelitis of right foot ([facility])      Open wound of right foot 2023     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 mellitus, with bone involvement without evidence of necrosis ([facility])      Diabetic ulcer of left midfoot associated with type 2 diabetes mellitus, limited to breakdown of skin ([facility])      Diabetic polyneuropathy associated with type 2 diabetes mellitus ([facility])      Diabetes mellitus type 2 with peripheral artery disease ([facility])      History of amputation of lesser toe of left foot ([facility])      Onychomycosis      Status post fall 2023     Traumatic rhabdomyolysis ([facility]) 2023     Leukocytosis 2023     Osteoarthritis of left hip 2022     Severe Left Orchalgia 2022     Right shoulder pain 2022     Left hip pain 2022     Hemodialysis  status (HCC)      Hypervolemia      Acute on chronic anemia 01/23/2022     ESRD (end stage renal disease) ([facility]) 01/21/2022     Type 2 diabetes mellitus, without long-term current use of insulin ([facility])      Hypertension      History of TIA (transient ischemic attack)      T10 vertebral fracture ([facility])        LOS (days): 8  Geometric Mean LOS (GMLOS) (days): 4.6  Days to GMLOS:-3.4     OBJECTIVE:  Risk of Unplanned Readmission Score: 30.31         Current admission status: Inpatient   Preferred Pharmacy:   [id number] - [place], [place] - [address] [address]  [address] [address]  Phone: [phone] Fax: [phone]    Primary Care Provider: No primary care provider on file.    Primary Insurance: MEDICARE  Secondary Insurance:     DISCHARGE DETAILS:    Discharge planning discussed with:: Patient  Freedom of Choice: Yes     CM contacted family/caregiver?: Yes (SW again attempted to reach sister Diana via phone. She did not answer and voicemail is full.)    Contacts  Patient Contacts: Alicja Carpio (sister)  Relationship to Patient:: Family  Contact Method: Phone  Phone Number: [phone]  Reason/Outcome: Emergency Contact    Other Referral/Resources/Interventions Provided:  Interventions: Dialysis     Treatment Team Recommendation: Home with Home Health Care  Discharge Destination Plan:: Home    SW reached out again to Natalio at [facility] ([phone]). Natalio clarified that patient has not actually started at [facility]. They sent the referral for transfer and have a chair time; however, patient was hospitalized before he could actually start care. Natalio noted that clinic has received calls from patient during this hospitalization stating that he does not want to transfer to [facility] and plans to resume care at [facility] on discharge. Patient plans on driving himself. Natalio notes that family has reached out to [facility] with concerns about patient continuing to go  "to [facility] and that they want him to go to [facility]. CURTIS advised Natalio that CURTIS has not been able to establish contact with sister Diana as she has not returned calls and her voicemail is now full as of this morning. SW also spoke with admin Alysia and advised of same.    SW met bedside with patient to discuss. Patient states that his choice is to continue at [facility] regardless of his family's preference and his choice is to drive himself \"as I have been doing for the last 2 years.\" SW inquiried further on patient's ability to transport himself as per Natalio/Alysia, patient has shown decreased compliance with treatments and will show 1x week if at all. Patient could not provide a reason for his noncompliance other than \"I dont know.\"     Patient requested to end conversation and for SW to exit the room. SW advised patient that call will be made back to [facility] and SW will advise of his choice to resume OP HD at that clinic with transportation provided by himself. Patient noted he will also be calling [facility] to speak with Natalio.     Follow-up call made to Natalio. He is aware of patient choice and noted that patient was calling on the other line and he would speak to patient next.    Attending advised of above plan.    "

## 2024-10-17 NOTE — ASSESSMENT & PLAN NOTE
Volume: Hypervolemic  Blood pressure: Hypertensive, /60  Continue with low-sodium diet and fluid restriction 1.5 to 1.8 L   Fluid restriction as above  UF on HD today  Continue with nifedipine 60 mg

## 2024-10-17 NOTE — PLAN OF CARE
Target UF Goal 2 L as tolerated. Patient dialyzing for  3.5 hours  on 3 K bath for serum K of  4.4  per protocol. Treatment plan reviewed with Nephrology.   Problem: METABOLIC, FLUID AND ELECTROLYTES - ADULT  Goal: Electrolytes maintained within normal limits  Description: INTERVENTIONS:  - Monitor labs and assess patient for signs and symptoms of electrolyte imbalances  - Administer electrolyte replacement as ordered  - Monitor response to electrolyte replacements, including repeat lab results as appropriate  - Instruct patient on fluid and nutrition as appropriate  Outcome: Progressing  Goal: Fluid balance maintained  Description: INTERVENTIONS:  - Monitor labs   - Monitor I/O and WT  - Instruct patient on fluid and nutrition as appropriate  - Assess for signs & symptoms of volume excess or deficit  Outcome: Progressing   Post-Dialysis RN Treatment Note    Blood Pressure:  Pre 149/56 mm/Hg  Post 168/58 mmHg   EDW  108 kg    Weight:  Pre 96.6 kg bed weight  Post 97.1 kg standing weight   Mode of weight measurement: Other   Volume Removed  2000 ml    Treatment duration 210 minutes    NS given  No    Treatment shortened? No   Medications given during Rx Epogen 3,000 units   Estimated Kt/V  None Reported   Access type: Permacath/TDC   Access Issues: No    Report called to primary nurse   Yes Dolores Dang RN

## 2024-10-18 ENCOUNTER — TELEPHONE (OUTPATIENT)
Age: 64
End: 2024-10-18

## 2024-10-18 PROBLEM — D64.9 ACUTE ON CHRONIC ANEMIA: Status: RESOLVED | Noted: 2022-01-23 | Resolved: 2024-10-18

## 2024-10-18 PROBLEM — N30.00 ACUTE CYSTITIS: Status: ACTIVE | Noted: 2024-10-18

## 2024-10-18 PROBLEM — R31.9 HEMATURIA: Status: RESOLVED | Noted: 2024-10-10 | Resolved: 2024-10-18

## 2024-10-18 LAB
ANION GAP SERPL CALCULATED.3IONS-SCNC: 9 MMOL/L (ref 4–13)
BACTERIA UR QL AUTO: ABNORMAL /HPF
BASOPHILS # BLD AUTO: 0.03 THOUSANDS/ΜL (ref 0–0.1)
BASOPHILS NFR BLD AUTO: 0 % (ref 0–1)
BILIRUB UR QL STRIP: NEGATIVE
BUN SERPL-MCNC: 27 MG/DL (ref 5–25)
CALCIUM SERPL-MCNC: 8.6 MG/DL (ref 8.4–10.2)
CHLORIDE SERPL-SCNC: 96 MMOL/L (ref 96–108)
CLARITY UR: CLEAR
CO2 SERPL-SCNC: 27 MMOL/L (ref 21–32)
COLOR UR: YELLOW
CREAT SERPL-MCNC: 4.35 MG/DL (ref 0.6–1.3)
EOSINOPHIL # BLD AUTO: 0.54 THOUSAND/ΜL (ref 0–0.61)
EOSINOPHIL NFR BLD AUTO: 7 % (ref 0–6)
ERYTHROCYTE [DISTWIDTH] IN BLOOD BY AUTOMATED COUNT: 16.1 % (ref 11.6–15.1)
GFR SERPL CREATININE-BSD FRML MDRD: 13 ML/MIN/1.73SQ M
GLUCOSE SERPL-MCNC: 106 MG/DL (ref 65–140)
GLUCOSE SERPL-MCNC: 106 MG/DL (ref 65–140)
GLUCOSE SERPL-MCNC: 113 MG/DL (ref 65–140)
GLUCOSE SERPL-MCNC: 119 MG/DL (ref 65–140)
GLUCOSE SERPL-MCNC: 154 MG/DL (ref 65–140)
GLUCOSE UR STRIP-MCNC: ABNORMAL MG/DL
HCT VFR BLD AUTO: 25.7 % (ref 36.5–49.3)
HGB BLD-MCNC: 8.2 G/DL (ref 12–17)
HGB UR QL STRIP.AUTO: ABNORMAL
IMM GRANULOCYTES # BLD AUTO: 0.04 THOUSAND/UL (ref 0–0.2)
IMM GRANULOCYTES NFR BLD AUTO: 1 % (ref 0–2)
INR PPP: 0.95 (ref 0.85–1.19)
KETONES UR STRIP-MCNC: NEGATIVE MG/DL
LEUKOCYTE ESTERASE UR QL STRIP: ABNORMAL
LYMPHOCYTES # BLD AUTO: 1.41 THOUSANDS/ΜL (ref 0.6–4.47)
LYMPHOCYTES NFR BLD AUTO: 18 % (ref 14–44)
MAGNESIUM SERPL-MCNC: 1.8 MG/DL (ref 1.9–2.7)
MCH RBC QN AUTO: 29.9 PG (ref 26.8–34.3)
MCHC RBC AUTO-ENTMCNC: 31.9 G/DL (ref 31.4–37.4)
MCV RBC AUTO: 94 FL (ref 82–98)
MONOCYTES # BLD AUTO: 0.73 THOUSAND/ΜL (ref 0.17–1.22)
MONOCYTES NFR BLD AUTO: 9 % (ref 4–12)
NEUTROPHILS # BLD AUTO: 5.07 THOUSANDS/ΜL (ref 1.85–7.62)
NEUTS SEG NFR BLD AUTO: 65 % (ref 43–75)
NITRITE UR QL STRIP: NEGATIVE
NON-SQ EPI CELLS URNS QL MICRO: ABNORMAL /HPF
NRBC BLD AUTO-RTO: 0 /100 WBCS
PH UR STRIP.AUTO: 8 [PH]
PHOSPHATE SERPL-MCNC: 3.3 MG/DL (ref 2.3–4.1)
PLATELET # BLD AUTO: 285 THOUSANDS/UL (ref 149–390)
PMV BLD AUTO: 9.7 FL (ref 8.9–12.7)
POTASSIUM SERPL-SCNC: 4.2 MMOL/L (ref 3.5–5.3)
PROT UR STRIP-MCNC: ABNORMAL MG/DL
PROTHROMBIN TIME: 13.2 SECONDS (ref 12.3–15)
RBC # BLD AUTO: 2.74 MILLION/UL (ref 3.88–5.62)
RBC #/AREA URNS AUTO: ABNORMAL /HPF
SODIUM SERPL-SCNC: 132 MMOL/L (ref 135–147)
SP GR UR STRIP.AUTO: 1.02 (ref 1–1.03)
UROBILINOGEN UR STRIP-ACNC: <2 MG/DL
WBC # BLD AUTO: 7.82 THOUSAND/UL (ref 4.31–10.16)
WBC #/AREA URNS AUTO: ABNORMAL /HPF

## 2024-10-18 PROCEDURE — 80048 BASIC METABOLIC PNL TOTAL CA: CPT

## 2024-10-18 PROCEDURE — 81001 URINALYSIS AUTO W/SCOPE: CPT | Performed by: NURSE PRACTITIONER

## 2024-10-18 PROCEDURE — 82948 REAGENT STRIP/BLOOD GLUCOSE: CPT

## 2024-10-18 PROCEDURE — 99232 SBSQ HOSP IP/OBS MODERATE 35: CPT

## 2024-10-18 PROCEDURE — 83735 ASSAY OF MAGNESIUM: CPT

## 2024-10-18 PROCEDURE — 97530 THERAPEUTIC ACTIVITIES: CPT

## 2024-10-18 PROCEDURE — 99232 SBSQ HOSP IP/OBS MODERATE 35: CPT | Performed by: STUDENT IN AN ORGANIZED HEALTH CARE EDUCATION/TRAINING PROGRAM

## 2024-10-18 PROCEDURE — 85025 COMPLETE CBC W/AUTO DIFF WBC: CPT

## 2024-10-18 PROCEDURE — 84100 ASSAY OF PHOSPHORUS: CPT

## 2024-10-18 PROCEDURE — 85610 PROTHROMBIN TIME: CPT

## 2024-10-18 RX ORDER — HYDROMORPHONE HCL IN WATER/PF 6 MG/30 ML
0.2 PATIENT CONTROLLED ANALGESIA SYRINGE INTRAVENOUS ONCE
Status: COMPLETED | OUTPATIENT
Start: 2024-10-18 | End: 2024-10-18

## 2024-10-18 RX ORDER — CEFTRIAXONE 1 G/50ML
1000 INJECTION, SOLUTION INTRAVENOUS EVERY 24 HOURS
Status: DISCONTINUED | OUTPATIENT
Start: 2024-10-18 | End: 2024-10-21

## 2024-10-18 RX ADMIN — HEPARIN SODIUM 5000 UNITS: 5000 INJECTION, SOLUTION INTRAVENOUS; SUBCUTANEOUS at 05:56

## 2024-10-18 RX ADMIN — ASPIRIN 81 MG CHEWABLE TABLET 81 MG: 81 TABLET CHEWABLE at 08:23

## 2024-10-18 RX ADMIN — GABAPENTIN 300 MG: 300 CAPSULE ORAL at 08:23

## 2024-10-18 RX ADMIN — PANTOPRAZOLE SODIUM 40 MG: 40 INJECTION, POWDER, FOR SOLUTION INTRAVENOUS at 08:23

## 2024-10-18 RX ADMIN — HYDROMORPHONE HYDROCHLORIDE 0.2 MG: 0.2 INJECTION, SOLUTION INTRAMUSCULAR; INTRAVENOUS; SUBCUTANEOUS at 21:48

## 2024-10-18 RX ADMIN — ESCITALOPRAM OXALATE 10 MG: 10 TABLET ORAL at 08:23

## 2024-10-18 RX ADMIN — NIFEDIPINE 60 MG: 30 TABLET, EXTENDED RELEASE ORAL at 17:22

## 2024-10-18 RX ADMIN — HEPARIN SODIUM 5000 UNITS: 5000 INJECTION, SOLUTION INTRAVENOUS; SUBCUTANEOUS at 21:49

## 2024-10-18 RX ADMIN — DOCUSATE SODIUM 100 MG: 100 CAPSULE, LIQUID FILLED ORAL at 08:23

## 2024-10-18 RX ADMIN — CEFTRIAXONE 1000 MG: 1 INJECTION, SOLUTION INTRAVENOUS at 12:13

## 2024-10-18 RX ADMIN — GABAPENTIN 300 MG: 300 CAPSULE ORAL at 17:22

## 2024-10-18 RX ADMIN — PANTOPRAZOLE SODIUM 40 MG: 40 INJECTION, POWDER, FOR SOLUTION INTRAVENOUS at 21:49

## 2024-10-18 RX ADMIN — DOCUSATE SODIUM 100 MG: 100 CAPSULE, LIQUID FILLED ORAL at 17:22

## 2024-10-18 RX ADMIN — HEPARIN SODIUM 5000 UNITS: 5000 INJECTION, SOLUTION INTRAVENOUS; SUBCUTANEOUS at 14:27

## 2024-10-18 RX ADMIN — ATORVASTATIN CALCIUM 40 MG: 40 TABLET, FILM COATED ORAL at 17:22

## 2024-10-18 RX ADMIN — HYOSCYAMINE SULFATE 1 APPLICATION: 16 SOLUTION at 08:28

## 2024-10-18 RX ADMIN — ALLOPURINOL 100 MG: 100 TABLET ORAL at 08:23

## 2024-10-18 RX ADMIN — HYDROMORPHONE HYDROCHLORIDE 0.2 MG: 0.2 INJECTION, SOLUTION INTRAMUSCULAR; INTRAVENOUS; SUBCUTANEOUS at 14:57

## 2024-10-18 NOTE — CASE MANAGEMENT
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, on chronic dialysis ([facility]) 10/14/2024     Falls 10/08/2024     PVD (peripheral vascular disease) ([facility]) 10/08/2024     Closed fracture of right pelvis ([facility]) 2024     Bilateral sacral fracture, closed ([facility]) 2024     Difficulty with speech 2024     History of amputation of hallux ([facility]) 2024     Hypertensive urgency 2024     Facial cellulitis 2024     Constipation 2023     Bradycardia 2023     Cellulitis of right foot      Polymicrobial bacterial infection 2023     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 mellitus, with necrosis of bone ([facility])      Acquired deformity of foot, right      Charcot's joint      Subacute osteomyelitis of right foot ([facility])      Open wound of right foot 2023     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 mellitus, with bone involvement without evidence of necrosis ([facility])      Diabetic ulcer of left midfoot associated with type 2 diabetes mellitus, limited to breakdown of skin ([facility])      Diabetic polyneuropathy associated with type 2 diabetes mellitus ([facility])      Diabetes mellitus type 2 with peripheral artery disease ([facility])      History of amputation of lesser toe of left foot ([facility])      Onychomycosis      Status post fall 2023     Traumatic rhabdomyolysis ([facility]) 2023     Leukocytosis 2023     Osteoarthritis of left hip 2022     Severe Left Orchalgia 2022     Right shoulder pain 2022     Left hip pain 2022      Hemodialysis status (HCC)      Hypervolemia      ESRD (end stage renal disease) ([facility]) 01/21/2022     Type 2 diabetes mellitus, without long-term current use of insulin (HCC)      Hypertension      History of TIA (transient ischemic attack)      T10 vertebral fracture ([facility])        LOS (days): 9  Geometric Mean LOS (GMLOS) (days): 4.6  Days to GMLOS:-4.3     OBJECTIVE:  Risk of Unplanned Readmission Score: 28.05         Current admission status: Inpatient   Preferred Pharmacy:   [id number] - [facility] [address] [place] [address]  Phone: [phone] Fax: [phone]    Primary Care Provider: No primary care provider on file.    Primary Insurance: MEDICARE  Secondary Insurance:     DISCHARGE DETAILS:    Discharge planning discussed with:: Patient     IMM Given (Date):: 10/18/24  IMM Given to:: Patient (IMM#2 reviewed with patient. Patient gave verbal understanding and signed. Copy placed in scan bin.)

## 2024-10-18 NOTE — ASSESSMENT & PLAN NOTE
Lab Results   Component Value Date    EGFR 13 10/18/2024    EGFR 8 10/17/2024    EGFR 12 10/16/2024    CREATININE 4.35 (H) 10/18/2024    CREATININE 6.25 (H) 10/17/2024    CREATININE 4.62 (H) 10/16/2024     Patient gets dialysis Tuesday, Thursday, Saturday and has missed dialysis for about a week since being discharged from short-term rehab.  Patient was scheduled for dialysis at a different facility but family stated that they cannot transport him for his appointments  Nephrology following  Continued on TTS schedule  Follows up with [facility] dialysis center.  Case management confirmed scheduling for dialysis center.  Continue Renagel for hyperphosphatemia

## 2024-10-18 NOTE — ASSESSMENT & PLAN NOTE
Volume: Fluid overload, discrepancy between chart and dialysis nurse note 0.5 versus 2 L of fluid removal   Blood pressure: Hypertensive, /61  Continue with low-sodium diet and fluid restriction 1.5 to 1.8 L   Fluid restriction as above  Continue with nifedipine 60 mg

## 2024-10-18 NOTE — TELEPHONE ENCOUNTER
STILL ADMITTED:10/8/2024 - present (10 days)  [facility]    HFU/ BEBA QUISPE/ ALVARO BAEZA DC-     ----- Message from Cecily Del Rosario PA-C sent at 10/17/2024  8:51 AM EDT -----  Regarding: HFU  Naresh Carpio will need follow up in 8-10 weeks with general attending or advance practitioner. He will not require outpatient neurological testing. HFU 60 minutes

## 2024-10-18 NOTE — ASSESSMENT & PLAN NOTE
Hemoglobin 10.2 on admission, noted to drop to 6.7 after TNK administration  Patient initially declined CT a/p and blood transfusion, eventually agreeable to 1 unit PRBC on 10/10  Urology following for hematuria, urine now clear  Denies black/bloody bowel movements  Aspirin and DVT prophylaxis currently on hold  Hemoglobin 7.6 this morning  Patient reports he still would like to hold off CT a/p to evaluate for source of bleeding  des 72, TIBC low, B12 and folate within normal limits    Acute anemia resolved   Restart ASA 10/16 successfully   Monitor H/H Q12 ; stable.  Hemoglobin 8 today.  UA recheck; no blood.  Outpatient follow-up with urology for possible cystoscopy.   Mother

## 2024-10-18 NOTE — ASSESSMENT & PLAN NOTE
Lab Results   Component Value Date    EGFR 13 10/18/2024    EGFR 8 10/17/2024    EGFR 12 10/16/2024    CREATININE 4.35 (H) 10/18/2024    CREATININE 6.25 (H) 10/17/2024    CREATININE 4.62 (H) 10/16/2024   Serum calcium 8.6 mg/dL   phosphorus at goal 5.4  No indication of binders at this time

## 2024-10-18 NOTE — ASSESSMENT & PLAN NOTE
Lab Results   Component Value Date    HGBA1C 5.5 10/08/2024       Recent Labs     10/17/24  0725 10/17/24  1106 10/17/24  1648 10/17/24  1953   POCGLU 110 113 131 151*       Blood Sugar Average: Last 72 hrs:  (P) [id number]    HbA1c 5.5  Glucose well-controlled  Continue with insulin  Maintain healthy diet (vegetables, fruits, whole grains, nonfat or low fat)  Weight loss  Physical activity (5 to 10 minutes to start the increase to 30 min a day)

## 2024-10-18 NOTE — ASSESSMENT & PLAN NOTE
Lab Results   Component Value Date    EGFR 13 10/18/2024    EGFR 8 10/17/2024    EGFR 12 10/16/2024    CREATININE 4.35 (H) 10/18/2024    CREATININE 6.25 (H) 10/17/2024    CREATININE 4.62 (H) 10/16/2024     Patient gets dialysis Tuesday, Thursday, Saturday and has missed dialysis for about a week since being discharged from short-term rehab.  Patient was scheduled for dialysis at a different facility but family stated that they cannot transport him for his appointments  Nephrology following  Continued on TTS schedule  Follows up with [facility] dialysis center.  Case management confirmed scheduling for dialysis center.  Continue Renagel for hyperphosphatemia  Next dialysis session on Saturday, 10/19/2024

## 2024-10-18 NOTE — PROGRESS NOTES
Progress Note - Nephrology   Name: Naresh Carpio 64 y.o. male I MRN: [id number]  Unit/Bed#: 4 [place] 414-01 I Date of Admission: 10/8/2024   Date of Service: 10/18/2024 I Hospital Day: 9     Assessment & Plan  ESRD (end stage renal disease) ([facility])  Lab Results   Component Value Date    EGFR 13 10/18/2024    EGFR 8 10/17/2024    EGFR 12 10/16/2024    CREATININE 4.35 (H) 10/18/2024    CREATININE 6.25 (H) 10/17/2024    CREATININE 4.62 (H) 10/16/2024   ESRD on HD TTS:  Dialysis unit/days:[facility]   Access: PermCath  Will continue as she has per schedule, next dialysis tomorrow if remains inpatient  Renal Diet  Fluid restriction  1.8 L  Adjust medications to GFR<10  Avoid opioids   Continue HD as per schedule TTS  Stable for discharge from my end        Type 2 diabetes mellitus, without long-term current use of insulin ([facility])  Lab Results   Component Value Date    HGBA1C 5.5 10/08/2024       Recent Labs     10/17/24  0725 10/17/24  1106 10/17/24  1648 10/17/24  1953   POCGLU 110 113 131 151*       Blood Sugar Average: Last 72 hrs:  (P) [id number]    HbA1c 5.5  Glucose well-controlled  Continue with insulin  Maintain healthy diet (vegetables, fruits, whole grains, nonfat or low fat)  Weight loss  Physical activity (5 to 10 minutes to start the increase to 30 min a day)    Acute on chronic anemia  Current hemoglobin: 8.2  mg/dL  Multifactorial secondary to ESRD   Treatment:  Epogen on HD today 3000   transfuse for hemoglobin less than 7.0 per primary service      Status post fall  PT OT  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, on chronic dialysis ([facility])  Lab Results   Component Value Date    EGFR 13 10/18/2024    EGFR 8 10/17/2024    EGFR 12 10/16/2024    CREATININE 4.35 (H) 10/18/2024    CREATININE 6.25 (H) 10/17/2024    CREATININE 4.62 (H) 10/16/2024   Serum calcium 8.6 mg/dL   phosphorus at goal 5.4  No indication of binders at this time  Hypertension  Volume:  Fluid overload, discrepancy between chart and dialysis nurse note 0.5 versus 2 L of fluid removal   Blood pressure: Hypertensive, /61  Continue with low-sodium diet and fluid restriction 1.5 to 1.8 L   Fluid restriction as above  Continue with nifedipine 60 mg     Subjective   Brief History of Admission -  63 yo man with PMH of ESRD on HD p/ after a fall. Nephrology is consulted for management of ESRD     Patient feels well, no shortness of breath, no chest pain.  Had dialysis yesterday with no issues    Objective :  Temp:  [98.3 °F (36.8 °C)-99.3 °F (37.4 °C)] 99.3 °F (37.4 °C)  HR:  [57-75] 75  BP: (148-168)/(54-68) 156/61  Resp:  [16-18] 18  SpO2:  [95 %-98 %] 98 %  O2 Device: None (Room air)    Current Weight: Weight - Scale: 96.9 kg (213 lb 9.6 oz)  First Weight: Weight - Scale: 112 kg (245 lb 13 oz)  I/O         10/16 0701  10/17 0700 10/17 0701  10/18 0700    P.O. 100 120    I.V. (mL/kg) 10 (0.1) 500 (5.2)    Total Intake(mL/kg) 110 (1.1) 620 (6.4)    Urine (mL/kg/hr) 500 (0.2) 100 (0)    Other  2500    Total Output 500 2600    Net -390 -1980                Physical Exam  General:  no acute distress at this time  Skin:  No acute rash  Eyes:  No scleral icterus and noninjected  ENT:  mucous membranes moist  Neck:  no carotid bruits  Chest:  Clear to auscultation percussion, good respiratory effort, no use of accessory respiratory muscles  CVS:  Regular rate and rhythm without rub   Abdomen:  soft and nontender   Extremities: lower extremity edema  Neuro:  No gross focality  Psych:  Alert , cooperative   Dialysis access: PermCath    Medications:    Current Facility-Administered Medications:     acetaminophen (Ofirmev) injection 1,000 mg, 1,000 mg, Intravenous, Q6H PRN, Lore Revankar, DO, Last Rate: 400 mL/hr at 10/15/24 2122, 1,000 mg at 10/15/24 2122    allopurinol (ZYLOPRIM) tablet 100 mg, 100 mg, Oral, Daily, Lore Silver DO, 100 mg at 10/17/24 0913    aspirin chewable tablet 81 mg, 81 mg, Oral,  Daily, Nirav Bruce MD, 81 mg at 10/17/24 0913    atorvastatin (LIPITOR) tablet 40 mg, 40 mg, Oral, Daily With Dinner, Lore Silver DO, 40 mg at 10/17/24 1705    docusate sodium (COLACE) capsule 100 mg, 100 mg, Oral, BID, Lore Amadorar DO, 100 mg at 10/17/24 1705    epoetin jessica (EPOGEN,PROCRIT) injection 3,000 Units, 3,000 Units, Intravenous, Once per day on Tuesday Thursday Saturday, Joselyn Reyes Bahamonde, MD, 3,000 Units at 10/17/24 1532    escitalopram (LEXAPRO) tablet 10 mg, 10 mg, Oral, Daily, Lore Silver DO, 10 mg at 10/17/24 0913    gabapentin (NEURONTIN) capsule 300 mg, 300 mg, Oral, BID, Gennaro Zelaya DO, 300 mg at 10/17/24 1705    heparin (porcine) subcutaneous injection 5,000 Units, 5,000 Units, Subcutaneous, Q8H KEMAL, Nirav Bruce MD, 5,000 Units at 10/18/24 0556    hydrALAZINE (APRESOLINE) injection 10 mg, 10 mg, Intravenous, Q6H PRN, Lore Amadorar DO, 10 mg at 10/10/24 1944    influenza vaccine, high-dose (Fluzone High-Dose) IM injection 0.5 mL, 0.5 mL, Intramuscular, Once PRN, Lore Amadorar, DO    insulin lispro (HumALOG/ADMELOG) 100 units/mL subcutaneous injection 1-6 Units, 1-6 Units, Subcutaneous, TID AC **AND** Fingerstick Glucose (POCT), , , TID AC, Lore Revankar, DO    labetalol (NORMODYNE) injection 10 mg, 10 mg, Intravenous, Q4H PRN, Lore Amadorar DO, 10 mg at 10/10/24 1822    NIFEdipine (PROCARDIA XL) 24 hr tablet 60 mg, 60 mg, Oral, After Dinner, Joselyn Reyes Bahamonde, MD, 60 mg at 10/17/24 1830    ondansetron (ZOFRAN) injection 4 mg, 4 mg, Intravenous, Q6H PRN, GRANT Rich, 4 mg at 10/12/24 2128    pantoprazole (PROTONIX) injection 40 mg, 40 mg, Intravenous, Q12H KEMAL, Lore Silver DO, 40 mg at 10/17/24 2005    polyethylene glycol (MIRALAX) packet 17 g, 17 g, Oral, Daily PRN, Lore Silver DO, 17 g at 10/10/24 1841    sodium hypochlorite (DAKIN'S HALF-STRENGTH) 0.25 percent topical solution 1 Application, 1 Application, Irrigation,  "Daily, Lore Silver DO, 1 Application at 10/17/24 0774      Lab Results: I have reviewed the following results:  Results from last 7 days   Lab Units 10/18/24  0555 10/17/24  0752 10/16/24  2005 10/16/24  0355 10/15/24  0831 10/14/24  0614 10/13/24  0958 10/12/24  1509 10/12/24  0435   WBC Thousand/uL 7.82  --   --  6.82 6.01 6.19 7.26  --  6.51   HEMOGLOBIN g/dL 8.2* 7.7* 7.6* 7.8* 7.6* 8.1* 7.8*   < > 7.3*   HEMATOCRIT % 25.7* 24.8* 24.4* 24.4* 23.7* 25.2* 24.3*   < > 22.9*   PLATELETS Thousands/uL 285  --   --  210 213 211 215  --  201   POTASSIUM mmol/L 4.2 4.4  --  4.1 4.3 4.5 4.1  --  4.8   CHLORIDE mmol/L 96 99  --  98 99 95* 95*  --  94*   CO2 mmol/L 27 23  --  27 27 26 26  --  24   BUN mg/dL 27* 40*  --  25 49* 38* 25  --  40*   CREATININE mg/dL 4.35* 6.25*  --  4.62* 7.85* 6.44* 4.93*  --  6.92*   CALCIUM mg/dL 8.6 8.0*  --  8.2* 7.7* 7.6* 7.7*  --  7.7*   MAGNESIUM mg/dL 1.8* 1.8*  --   --  1.9  --   --   --   --    PHOSPHORUS mg/dL 3.3  --   --   --   --   --   --   --   --     < > = values in this interval not displayed.       Administrative Statements     Portions of the record may have been created with voice recognition software. Occasional wrong word or \"sound a like\" substitutions may have occurred due to the inherent limitations of voice recognition software. Read the chart carefully and recognize, using context, where substitutions have occurred.If you have any questions, please contact the dictating provider.  "

## 2024-10-18 NOTE — PROGRESS NOTES
Patient examined spoke to the nurse medical and nephrology notes reviewed and noted.  Patient reports that he is feeling fine and he is planning to go home tomorrow after the dialysis.  Creatinine is improving his last creatinine is 4.35.  I had a long discussion with the patient stating that he should follow-up with outpatient medical care including regular hemodialysis otherwise he gets sick he clearly understood stating that he messed it up because he had a fall he is explained that in that case he needs to ask for help and get to the dialysis and continue with his needed medical care he understood he said he has a good support from sister and brother-in-law and he does not talk to another sister and he wants to go home he agreed that he will do everything to take care of himself.  Patient is pleasant cooperative not confused no behavioral problem not agitated thanking me for coming in talking to him he is in a good mood offers no new complaints.  Nurse reported no behavioral problem.  Patient seems to be doing much better medically.  Treatment is in progress.  Patient is recommended to continue his Lexapro after the discharge but he seems to be resistive.  No psychosis no agitation not suicidal.  Therapy is done with good response.  Continue same treatment at this time and follow-up.

## 2024-10-18 NOTE — PLAN OF CARE
Problem: OCCUPATIONAL THERAPY ADULT  Goal: Performs self-care activities at highest level of function for planned discharge setting.  See evaluation for individualized goals.  Description: Treatment Interventions: ADL retraining, Functional transfer training, Endurance training, Activityengagement          See flowsheet documentation for full assessment, interventions and recommendations.   Outcome: Progressing  Note: Limitation: Decreased ADL status, Decreased UE strength, Decreased Safe judgement during ADL, Decreased endurance, Decreased self-care trans, Decreased high-level ADLs  Prognosis: Good  Assessment: Patient seen for OT treatment.  Patient agreeable to functional mobility today and completed with supervision.  Patient declined further ADLS.  Patient will benefit from continued OT services to maximize functional performance with ADLS.     Rehab Resource Intensity Level, OT: III (Minimum Resource Intensity)

## 2024-10-18 NOTE — DISCHARGE SUMMARY
Discharge Summary - Hospitalist   Name: Naresh Carpio 64 y.o. male I MRN: [id number]  Unit/Bed#: [address] [place] [address]-01 I Date of Admission: 10/8/2024   Date of Service: 10/18/2024 I Hospital Day: 9     Assessment & Plan  ESRD (end stage renal disease) ([facility])  Lab Results   Component Value Date    EGFR 13 10/18/2024    EGFR 8 10/17/2024    EGFR 12 10/16/2024    CREATININE 4.35 (H) 10/18/2024    CREATININE 6.25 (H) 10/17/2024    CREATININE 4.62 (H) 10/16/2024     Patient gets dialysis Tuesday, Thursday, Saturday and has missed dialysis for about a week since being discharged from short-term rehab.  Patient was scheduled for dialysis at a different facility but family stated that they cannot transport him for his appointments  Nephrology following  Continued on TTS schedule  Follows up with [facility] dialysis center.  Case management confirmed scheduling for dialysis center.  Continue Renagel for hyperphosphatemia  Acute on chronic anemia (Resolved: 10/18/2024)  Hemoglobin 10.2 on admission, noted to drop to 6.7 after TNK administration  Patient initially declined CT a/p and blood transfusion, eventually agreeable to 1 unit PRBC on 10/10  Urology following for hematuria, urine now clear  Denies black/bloody bowel movements  Aspirin and DVT prophylaxis currently on hold  Hemoglobin 7.6 this morning  Patient reports he still would like to hold off CT a/p to evaluate for source of bleeding  des 72, TIBC low, B12 and folate within normal limits    Acute anemia resolved   Restart ASA 10/16 successfully   Monitor H/H Q12 ; stable.  Hemoglobin 8 today.  UA recheck; no blood.  Outpatient follow-up with urology for possible cystoscopy.  Type 2 diabetes mellitus, without long-term current use of insulin ([facility])  Lab Results   Component Value Date    HGBA1C 5.5 10/08/2024       Recent Labs     10/17/24  1106 10/17/24  1648 10/17/24  1953 10/18/24  0718   POCGLU 113 131 151* 106   SSI  Diabetic diet  Status post fall  CT head, CT c-spine,  and XR R knee negative for acute abnormality on admission  PT/OT saw patient again on 10/15 as patient was seen concerns regarding ability to climb 8 steps into the home.  PT indicated that patient would have difficulty navigating stairs due to deconditioning, recommending short-term rehab  Discussed with case management  Fall precautions  PVD (peripheral vascular disease) ([facility])  Continue statin  Hypertension  Continue nifedipine 60 mg p.o. 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   Urology consulted  Patient notes clear urine since 10/16 atleast.   Restart ASA and DVT PPE 10/16  Stable H and H .  Hemoglobin 8 today at baseline.  Recheck UA on 10/17; no blood.    AMB follow up with urology for cystoscopy   Hematuria resolved       Medical Problems       Resolved Problems  Date Reviewed: 10/18/2024            Resolved    * (Principal) Acute on chronic anemia 10/18/2024     Resolved by  Nirav Bruce MD    Hyperkalemia 10/14/2024     Resolved by  Joselyn Reyes Bahamonde, MD    Toxic metabolic encephalopathy 10/17/2024     Resolved by  Nirav Bruce MD    Hematuria 10/18/2024     Resolved by  Nirav Bruce MD        Discharging Physician / Practitioner: Nirav Bruce MD  PCP: No primary care provider on file.  Admission Date:   Admission Orders (From admission, onward)       Ordered        10/09/24 1342  INPATIENT ADMISSION  Once             10/08/24 0518  Place in Observation  Once                          Discharge Date: 10/18/24    Consultations During Hospital Stay:  Nephrology  Urology  Neurology  Critical care  Psychiatry    Procedures Performed:   MRI brain wo contrast   Final Result      No acute ischemia.      Minor periventricular white matter change consistent with chronic microangiopathy.      Workstation performed: [id number]         CTA stroke alert (head/neck)   Final Result      -No large vessel occlusion, high-grade stenosis or dissection.      -Intracranial ICA atherosclerosis resulting in narrowing most prominent involving the proximal right supraclinoid segment (moderate to advanced).               Findings were directly discussed with Nicole Middleton at 1:57 pm. on 10/8/2024.      Workstation performed: [id number]         CT stroke alert brain   Final Result      -No acute intracranial hemorrhage, mass effect or midline shift.      Findings were directly discussed with Nicole Middelton at 1:57 pm. on 10/8/2024.      Workstation performed: [id number]         CT head without contrast   Final Result      No acute intracranial abnormality.                  Workstation performed: [id number]         CT spine cervical without contrast   Final Result      No cervical spine fracture or traumatic malalignment.                  Workstation performed: [id number]         XR knee 4+ vw right injury   Final Result      No acute osseous abnormality.   Severe tricompartmental osteoarthritis. Moderate joint effusion.      Computerized Assisted Algorithm (CAA) may have been used to analyze all applicable images.         Workstation performed: [id number]               Significant Findings / Test Results:   Results for orders placed or performed during the hospital encounter of 10/08/24   Fingerstick Glucose (POCT)    Collection Time: 10/08/24  1:45 AM   Result Value Ref Range    POC Glucose 174 (H) 65 - 140 mg/dl   CBC and differential    Collection  Time: 10/08/24  2:16 AM   Result Value Ref Range    WBC 7.36 4.31 - 10.16 Thousand/uL    RBC 3.36 (L) 3.88 - 5.62 Million/uL    Hemoglobin 9.8 (L) 12.0 - 17.0 g/dL    Hematocrit 31.6 (L) 36.5 - 49.3 %    MCV 94 82 - 98 fL    MCH 29.2 26.8 - 34.3 pg    MCHC 31.0 (L) 31.4 - 37.4 g/dL    RDW 16.9 (H) 11.6 - 15.1 %    MPV 9.4 8.9 - 12.7 fL    Platelets 192 149 - 390 Thousands/uL    nRBC 0 /100 WBCs    Segmented % 76 (H) 43 - 75 %    Immature Grans % 0 0 - 2 %    Lymphocytes % 12 (L) 14 - 44 %    Monocytes % 8 4 - 12 %    Eosinophils Relative 3 0 - 6 %    Basophils Relative 1 0 - 1 %    Absolute Neutrophils 5.61 1.85 - 7.62 Thousands/µL    Absolute Immature Grans 0.03 0.00 - 0.20 Thousand/uL    Absolute Lymphocytes 0.85 0.60 - 4.47 Thousands/µL    Absolute Monocytes 0.59 0.17 - 1.22 Thousand/µL    Eosinophils Absolute 0.24 0.00 - 0.61 Thousand/µL    Basophils Absolute 0.04 0.00 - 0.10 Thousands/µL   Comprehensive metabolic panel    Collection Time: 10/08/24  2:16 AM   Result Value Ref Range    Sodium 138 135 - 147 mmol/L    Potassium 5.8 (H) 3.5 - 5.3 mmol/L    Chloride 105 96 - 108 mmol/L    CO2 19 (L) 21 - 32 mmol/L    ANION GAP 14 (H) 4 - 13 mmol/L    BUN 95 (H) 5 - 25 mg/dL    Creatinine 11.13 (H) 0.60 - 1.30 mg/dL    Glucose 145 (H) 65 - 140 mg/dL    Calcium 8.1 (L) 8.4 - 10.2 mg/dL    AST 24 13 - 39 U/L    ALT 37 7 - 52 U/L    Alkaline Phosphatase 95 34 - 104 U/L    Total Protein 7.1 6.4 - 8.4 g/dL    Albumin 4.0 3.5 - 5.0 g/dL    Total Bilirubin 0.45 0.20 - 1.00 mg/dL    eGFR 4 ml/min/1.73sq m   Phosphorus    Collection Time: 10/08/24  2:16 AM   Result Value Ref Range    Phosphorus 7.7 (H) 2.3 - 4.1 mg/dL   Magnesium    Collection Time: 10/08/24  2:16 AM   Result Value Ref Range    Magnesium 2.3 1.9 - 2.7 mg/dL   Lipid Panel with Direct LDL reflex    Collection Time: 10/08/24  2:16 AM   Result Value Ref Range    Cholesterol 124 See Comment mg/dL    Triglycerides 85 See Comment mg/dL    HDL, Direct 37 (L) >=40  mg/dL    LDL Calculated 70 0 - 100 mg/dL   CBC and differential    Collection Time: 10/08/24  4:54 AM   Result Value Ref Range    WBC 9.63 4.31 - 10.16 Thousand/uL    RBC 3.44 (L) 3.88 - 5.62 Million/uL    Hemoglobin 10.2 (L) 12.0 - 17.0 g/dL    Hematocrit 32.3 (L) 36.5 - 49.3 %    MCV 94 82 - 98 fL    MCH 29.7 26.8 - 34.3 pg    MCHC 31.6 31.4 - 37.4 g/dL    RDW 16.8 (H) 11.6 - 15.1 %    MPV 9.7 8.9 - 12.7 fL    Platelets 206 149 - 390 Thousands/uL    nRBC 0 /100 WBCs    Segmented % 72 43 - 75 %    Immature Grans % 0 0 - 2 %    Lymphocytes % 15 14 - 44 %    Monocytes % 10 4 - 12 %    Eosinophils Relative 3 0 - 6 %    Basophils Relative 0 0 - 1 %    Absolute Neutrophils 6.88 1.85 - 7.62 Thousands/µL    Absolute Immature Grans 0.02 0.00 - 0.20 Thousand/uL    Absolute Lymphocytes 1.45 0.60 - 4.47 Thousands/µL    Absolute Monocytes 0.93 0.17 - 1.22 Thousand/µL    Eosinophils Absolute 0.31 0.00 - 0.61 Thousand/µL    Basophils Absolute 0.04 0.00 - 0.10 Thousands/µL   Comprehensive metabolic panel    Collection Time: 10/08/24  4:54 AM   Result Value Ref Range    Sodium 138 135 - 147 mmol/L    Potassium 5.8 (H) 3.5 - 5.3 mmol/L    Chloride 106 96 - 108 mmol/L    CO2 16 (L) 21 - 32 mmol/L    ANION GAP 16 (H) 4 - 13 mmol/L    BUN 94 (H) 5 - 25 mg/dL    Creatinine 11.08 (H) 0.60 - 1.30 mg/dL    Glucose 139 65 - 140 mg/dL    Calcium 8.3 (L) 8.4 - 10.2 mg/dL    AST 25 13 - 39 U/L    ALT 39 7 - 52 U/L    Alkaline Phosphatase 100 34 - 104 U/L    Total Protein 7.5 6.4 - 8.4 g/dL    Albumin 4.2 3.5 - 5.0 g/dL    Total Bilirubin 0.52 0.20 - 1.00 mg/dL    eGFR 4 ml/min/1.73sq m   ECG 12 lead    Collection Time: 10/08/24  5:24 AM   Result Value Ref Range    Ventricular Rate 75 BPM    Atrial Rate 75 BPM    CO Interval 184 ms    QRSD Interval 74 ms    QT Interval 398 ms    QTC Interval 444 ms    P Eugene 69 degrees    QRS Axis 20 degrees    T Wave Axis 66 degrees   Fingerstick Glucose (POCT)    Collection Time: 10/08/24  7:39 AM   Result  "Value Ref Range    POC Glucose 78 65 - 140 mg/dl   Hemoglobin A1c w/EAG Estimation (Prechecked if no A1C within 90 days)    Collection Time: 10/08/24  8:39 AM   Result Value Ref Range    Hemoglobin A1C 5.5 Normal 4.0-5.6%; PreDiabetic 5.7-6.4%; Diabetic >=6.5%; Glycemic control for adults with diabetes <7.0% %     mg/dl   Hepatitis Panel (IP Renal Unit)    Collection Time: 10/08/24  9:48 AM   Result Value Ref Range    Hepatitis B Surface Ag Non-reactive Non-Reactive    Hep B S Ab 42.50 3-500 mIU/mL mIU/mL    Hepatitis C Ab Non-reactive Non-Reactive    Hep B C IgM Non-reactive Non-Reactive    Hep B Core Total Ab Non-reactive Non-Reactive   Fingerstick Glucose (POCT)    Collection Time: 10/08/24 11:11 AM   Result Value Ref Range    POC Glucose 141 (H) 65 - 140 mg/dl   Fingerstick Glucose (POCT)    Collection Time: 10/08/24  1:10 PM   Result Value Ref Range    POC Glucose 184 (H) 65 - 140 mg/dl   Basic metabolic panel    Collection Time: 10/08/24  2:07 PM   Result Value Ref Range    Sodium 132 (L) 135 - 147 mmol/L    Potassium 4.1 3.5 - 5.3 mmol/L    Chloride 98 96 - 108 mmol/L    CO2 22 21 - 32 mmol/L    ANION GAP 12 4 - 13 mmol/L    BUN 35 (H) 5 - 25 mg/dL    Creatinine 5.20 (H) 0.60 - 1.30 mg/dL    Glucose 168 (H) 65 - 140 mg/dL    Calcium 8.0 (L) 8.4 - 10.2 mg/dL    eGFR 10 ml/min/1.73sq m   HS Troponin 0hr (reflex protocol)    Collection Time: 10/08/24  2:07 PM   Result Value Ref Range    hs TnI 0hr 73 (H) \"Refer to ACS Flowchart\"- see link ng/L   Magnesium    Collection Time: 10/08/24  2:07 PM   Result Value Ref Range    Magnesium 1.9 1.9 - 2.7 mg/dL   MRSA culture    Collection Time: 10/08/24  2:08 PM    Specimen: Nose; Nares   Result Value Ref Range    MRSA Culture Only       No Methicillin Resistant Staphlyococcus aureus (MRSA) isolated   Fingerstick Glucose (POCT)    Collection Time: 10/08/24  5:52 PM   Result Value Ref Range    POC Glucose 117 65 - 140 mg/dl   Rapid drug screen, urine    Collection " Time: 10/08/24  8:29 PM   Result Value Ref Range    Amph/Meth UR Negative Negative    Barbiturate Ur Negative Negative    Benzodiazepine Urine Negative Negative    Cocaine Urine Negative Negative    Methadone Urine Negative Negative    Opiate Urine Negative Negative    PCP Ur Negative Negative    THC Urine Negative Negative    Oxycodone Urine Positive (A) Negative    Fentanyl Urine Negative Negative    HYDROCODONE URINE Negative Negative   Fingerstick Glucose (POCT)    Collection Time: 10/08/24 11:27 PM   Result Value Ref Range    POC Glucose 122 65 - 140 mg/dl   Fingerstick Glucose (POCT)    Collection Time: 10/09/24  5:50 AM   Result Value Ref Range    POC Glucose 106 65 - 140 mg/dl   Echo complete w/ contrast if indicated    Collection Time: 10/09/24  8:00 AM   Result Value Ref Range    Triscuspid Valve Regurgitation Peak Gradient 24.0 mmHg    RAA A4C 14.8 cm2    LA Volume Index (BP) 55.1 mL/m2    MV Peak A Raza 1.06 m/s    MV stenosis pressure 1/2 time 72 ms    MV Peak E Raza 60 cm/s    E wave deceleration time 248 ms    E/A ratio 0.57     MV valve area p 1/2 method 3.06     TR Peak Raza 2.5 m/s    RVID d 4.3 cm    A4C EF 66 %    Tricuspid valve peak regurgitation velocity 2.47 m/s    Left ventricular stroke volume (2D) 65.00 mL    IVSd 1.00 cm    Tricuspid annular plane systolic excursion 2.10 cm    Ao root 3.80 cm    LVPWd 1.30 cm    LA size 4.8 cm    LA volume (BP) 125 mL    FS 31 28 - 44    LVIDS 3.40 cm    IVS 1 cm    LVIDd 4.90 cm    LA length (A2C) 5.90 cm    LEFT VENTRICLE SYSTOLIC VOLUME (MOD BIPLANE) 2D 49 mL    LV DIASTOLIC VOLUME (MOD BIPLANE) 2D 114 mL    Left Atrium Area-systolic Four Chamber 29.3 cm2    Left Atrium Area-systolic Apical Two Chamber 33.2 cm2    MV E' Tissue Velocity Lateral 11 cm/s    MV E' Tissue Velocity Septal 7 cm/s    LVSV, 2D 65 mL    BSA 2.27 m2    LV EF 60    Fingerstick Glucose (POCT)    Collection Time: 10/09/24 12:02 PM   Result Value Ref Range    POC Glucose 89 65 - 140  mg/dl   Basic metabolic panel    Collection Time: 10/09/24  3:44 PM   Result Value Ref Range    Sodium 131 (L) 135 - 147 mmol/L    Potassium 4.7 3.5 - 5.3 mmol/L    Chloride 98 96 - 108 mmol/L    CO2 23 21 - 32 mmol/L    ANION GAP 10 4 - 13 mmol/L    BUN 45 (H) 5 - 25 mg/dL    Creatinine 7.12 (H) 0.60 - 1.30 mg/dL    Glucose 111 65 - 140 mg/dL    Calcium 7.5 (L) 8.4 - 10.2 mg/dL    eGFR 7 ml/min/1.73sq m   CBC and differential    Collection Time: 10/09/24  3:44 PM   Result Value Ref Range    WBC 5.74 4.31 - 10.16 Thousand/uL    RBC 2.33 (L) 3.88 - 5.62 Million/uL    Hemoglobin 6.9 (L) 12.0 - 17.0 g/dL    Hematocrit 21.4 (L) 36.5 - 49.3 %    MCV 92 82 - 98 fL    MCH 29.6 26.8 - 34.3 pg    MCHC 32.2 31.4 - 37.4 g/dL    RDW 17.2 (H) 11.6 - 15.1 %    MPV 10.2 8.9 - 12.7 fL    Platelets 169 149 - 390 Thousands/uL    nRBC 0 /100 WBCs    Segmented % 66 43 - 75 %    Immature Grans % 0 0 - 2 %    Lymphocytes % 17 14 - 44 %    Monocytes % 13 (H) 4 - 12 %    Eosinophils Relative 4 0 - 6 %    Basophils Relative 0 0 - 1 %    Absolute Neutrophils 3.77 1.85 - 7.62 Thousands/µL    Absolute Immature Grans 0.01 0.00 - 0.20 Thousand/uL    Absolute Lymphocytes 0.96 0.60 - 4.47 Thousands/µL    Absolute Monocytes 0.73 0.17 - 1.22 Thousand/µL    Eosinophils Absolute 0.25 0.00 - 0.61 Thousand/µL    Basophils Absolute 0.02 0.00 - 0.10 Thousands/µL   Fingerstick Glucose (POCT)    Collection Time: 10/09/24  4:04 PM   Result Value Ref Range    POC Glucose 114 65 - 140 mg/dl   CBC and differential    Collection Time: 10/09/24  4:20 PM   Result Value Ref Range    WBC 5.67 4.31 - 10.16 Thousand/uL    RBC 2.28 (L) 3.88 - 5.62 Million/uL    Hemoglobin 6.7 (L) 12.0 - 17.0 g/dL    Hematocrit 21.0 (L) 36.5 - 49.3 %    MCV 92 82 - 98 fL    MCH 29.4 26.8 - 34.3 pg    MCHC 31.9 31.4 - 37.4 g/dL    RDW 17.3 (H) 11.6 - 15.1 %    MPV 9.8 8.9 - 12.7 fL    Platelets 158 149 - 390 Thousands/uL    nRBC 0 /100 WBCs    Segmented % 64 43 - 75 %    Immature Grans %  0 0 - 2 %    Lymphocytes % 18 14 - 44 %    Monocytes % 13 (H) 4 - 12 %    Eosinophils Relative 5 0 - 6 %    Basophils Relative 0 0 - 1 %    Absolute Neutrophils 3.61 1.85 - 7.62 Thousands/µL    Absolute Immature Grans 0.02 0.00 - 0.20 Thousand/uL    Absolute Lymphocytes 1.03 0.60 - 4.47 Thousands/µL    Absolute Monocytes 0.72 0.17 - 1.22 Thousand/µL    Eosinophils Absolute 0.27 0.00 - 0.61 Thousand/µL    Basophils Absolute 0.02 0.00 - 0.10 Thousands/µL   Type and screen    Collection Time: 10/09/24  4:42 PM   Result Value Ref Range    ABO Grouping A     Rh Factor Positive     Antibody Screen Negative     Specimen Expiration Date 20241012    Fingerstick Glucose (POCT)    Collection Time: 10/09/24  8:03 PM   Result Value Ref Range    POC Glucose 123 65 - 140 mg/dl   Hemoglobin and hematocrit, blood    Collection Time: 10/09/24  8:04 PM   Result Value Ref Range    Hemoglobin 6.9 (L) 12.0 - 17.0 g/dL    Hematocrit 22.0 (L) 36.5 - 49.3 %   Basic metabolic panel    Collection Time: 10/10/24  6:10 AM   Result Value Ref Range    Sodium 132 (L) 135 - 147 mmol/L    Potassium 5.2 3.5 - 5.3 mmol/L    Chloride 99 96 - 108 mmol/L    CO2 22 21 - 32 mmol/L    ANION GAP 11 4 - 13 mmol/L    BUN 49 (H) 5 - 25 mg/dL    Creatinine 7.55 (H) 0.60 - 1.30 mg/dL    Glucose 86 65 - 140 mg/dL    Calcium 7.5 (L) 8.4 - 10.2 mg/dL    eGFR 6 ml/min/1.73sq m   CBC and differential    Collection Time: 10/10/24  6:10 AM   Result Value Ref Range    WBC 6.49 4.31 - 10.16 Thousand/uL    RBC 2.32 (L) 3.88 - 5.62 Million/uL    Hemoglobin 6.9 (L) 12.0 - 17.0 g/dL    Hematocrit 21.6 (L) 36.5 - 49.3 %    MCV 93 82 - 98 fL    MCH 29.7 26.8 - 34.3 pg    MCHC 31.9 31.4 - 37.4 g/dL    RDW 17.3 (H) 11.6 - 15.1 %    MPV 10.3 8.9 - 12.7 fL    Platelets 190 149 - 390 Thousands/uL    nRBC 0 /100 WBCs    Segmented % 67 43 - 75 %    Immature Grans % 0 0 - 2 %    Lymphocytes % 15 14 - 44 %    Monocytes % 11 4 - 12 %    Eosinophils Relative 6 0 - 6 %    Basophils  Relative 1 0 - 1 %    Absolute Neutrophils 4.39 1.85 - 7.62 Thousands/µL    Absolute Immature Grans 0.02 0.00 - 0.20 Thousand/uL    Absolute Lymphocytes 0.97 0.60 - 4.47 Thousands/µL    Absolute Monocytes 0.70 0.17 - 1.22 Thousand/µL    Eosinophils Absolute 0.38 0.00 - 0.61 Thousand/µL    Basophils Absolute 0.03 0.00 - 0.10 Thousands/µL   Magnesium    Collection Time: 10/10/24  6:10 AM   Result Value Ref Range    Magnesium 2.1 1.9 - 2.7 mg/dL   Fingerstick Glucose (POCT)    Collection Time: 10/10/24  6:11 AM   Result Value Ref Range    POC Glucose 85 65 - 140 mg/dl   Type and screen    Collection Time: 10/10/24  9:09 AM   Result Value Ref Range    ABO Grouping A     Rh Factor Positive     Antibody Screen Negative     Specimen Expiration Date 20241013    Fingerstick Glucose (POCT)    Collection Time: 10/10/24 11:38 AM   Result Value Ref Range    POC Glucose 104 65 - 140 mg/dl   Fingerstick Glucose (POCT)    Collection Time: 10/10/24  3:35 PM   Result Value Ref Range    POC Glucose 104 65 - 140 mg/dl   Hemoglobin    Collection Time: 10/10/24  4:38 PM   Result Value Ref Range    Hemoglobin 8.1 (L) 12.0 - 17.0 g/dL   Fingerstick Glucose (POCT)    Collection Time: 10/10/24  8:18 PM   Result Value Ref Range    POC Glucose 119 65 - 140 mg/dl   CBC and differential    Collection Time: 10/11/24  5:10 AM   Result Value Ref Range    WBC 6.51 4.31 - 10.16 Thousand/uL    RBC 2.68 (L) 3.88 - 5.62 Million/uL    Hemoglobin 7.9 (L) 12.0 - 17.0 g/dL    Hematocrit 24.1 (L) 36.5 - 49.3 %    MCV 90 82 - 98 fL    MCH 29.5 26.8 - 34.3 pg    MCHC 32.8 31.4 - 37.4 g/dL    RDW 17.2 (H) 11.6 - 15.1 %    MPV 10.2 8.9 - 12.7 fL    Platelets 207 149 - 390 Thousands/uL    nRBC 0 /100 WBCs    Segmented % 74 43 - 75 %    Immature Grans % 0 0 - 2 %    Lymphocytes % 12 (L) 14 - 44 %    Monocytes % 10 4 - 12 %    Eosinophils Relative 4 0 - 6 %    Basophils Relative 0 0 - 1 %    Absolute Neutrophils 4.75 1.85 - 7.62 Thousands/µL    Absolute Immature  Grans 0.02 0.00 - 0.20 Thousand/uL    Absolute Lymphocytes 0.80 0.60 - 4.47 Thousands/µL    Absolute Monocytes 0.66 0.17 - 1.22 Thousand/µL    Eosinophils Absolute 0.26 0.00 - 0.61 Thousand/µL    Basophils Absolute 0.02 0.00 - 0.10 Thousands/µL   Basic metabolic panel    Collection Time: 10/11/24  5:10 AM   Result Value Ref Range    Sodium 132 (L) 135 - 147 mmol/L    Potassium 4.6 3.5 - 5.3 mmol/L    Chloride 94 (L) 96 - 108 mmol/L    CO2 26 21 - 32 mmol/L    ANION GAP 12 4 - 13 mmol/L    BUN 30 (H) 5 - 25 mg/dL    Creatinine 5.25 (H) 0.60 - 1.30 mg/dL    Glucose 110 65 - 140 mg/dL    Calcium 8.4 8.4 - 10.2 mg/dL    eGFR 10 ml/min/1.73sq m   Magnesium    Collection Time: 10/11/24  5:10 AM   Result Value Ref Range    Magnesium 2.0 1.9 - 2.7 mg/dL   Phosphorus    Collection Time: 10/11/24  5:10 AM   Result Value Ref Range    Phosphorus 5.4 (H) 2.3 - 4.1 mg/dL   Prepare Leukoreduced RBC: 1 Units    Collection Time: 10/11/24  6:15 AM   Result Value Ref Range    Unit Product Code M9461X25     Unit Number [id number]     Unit ABO A     Unit RH POS     Crossmatch Compatible     Unit Dispense Status Presumed Trans     Unit Product Volume 350 mL   Fingerstick Glucose (POCT)    Collection Time: 10/11/24  7:19 AM   Result Value Ref Range    POC Glucose 100 65 - 140 mg/dl   Fingerstick Glucose (POCT)    Collection Time: 10/11/24 10:48 AM   Result Value Ref Range    POC Glucose 115 65 - 140 mg/dl   Fingerstick Glucose (POCT)    Collection Time: 10/11/24  4:02 PM   Result Value Ref Range    POC Glucose 106 65 - 140 mg/dl   Fingerstick Glucose (POCT)    Collection Time: 10/11/24  8:35 PM   Result Value Ref Range    POC Glucose 118 65 - 140 mg/dl   Basic metabolic panel    Collection Time: 10/12/24  4:35 AM   Result Value Ref Range    Sodium 129 (L) 135 - 147 mmol/L    Potassium 4.8 3.5 - 5.3 mmol/L    Chloride 94 (L) 96 - 108 mmol/L    CO2 24 21 - 32 mmol/L    ANION GAP 11 4 - 13 mmol/L    BUN 40 (H) 5 - 25 mg/dL    Creatinine  6.92 (H) 0.60 - 1.30 mg/dL    Glucose 121 65 - 140 mg/dL    Calcium 7.7 (L) 8.4 - 10.2 mg/dL    eGFR 7 ml/min/1.73sq m   CBC    Collection Time: 10/12/24  4:35 AM   Result Value Ref Range    WBC 6.51 4.31 - 10.16 Thousand/uL    RBC 2.50 (L) 3.88 - 5.62 Million/uL    Hemoglobin 7.3 (L) 12.0 - 17.0 g/dL    Hematocrit 22.9 (L) 36.5 - 49.3 %    MCV 92 82 - 98 fL    MCH 29.2 26.8 - 34.3 pg    MCHC 31.9 31.4 - 37.4 g/dL    RDW 16.9 (H) 11.6 - 15.1 %    Platelets 201 149 - 390 Thousands/uL    MPV 9.8 8.9 - 12.7 fL   Fingerstick Glucose (POCT)    Collection Time: 10/12/24  7:24 AM   Result Value Ref Range    POC Glucose 98 65 - 140 mg/dl   Fingerstick Glucose (POCT)    Collection Time: 10/12/24 11:20 AM   Result Value Ref Range    POC Glucose 91 65 - 140 mg/dl   Hemoglobin and hematocrit, blood    Collection Time: 10/12/24  3:09 PM   Result Value Ref Range    Hemoglobin 7.9 (L) 12.0 - 17.0 g/dL    Hematocrit 24.6 (L) 36.5 - 49.3 %   Fingerstick Glucose (POCT)    Collection Time: 10/12/24  4:52 PM   Result Value Ref Range    POC Glucose 124 65 - 140 mg/dl   Fingerstick Glucose (POCT)    Collection Time: 10/12/24  8:43 PM   Result Value Ref Range    POC Glucose 106 65 - 140 mg/dl   Fingerstick Glucose (POCT)    Collection Time: 10/13/24  7:28 AM   Result Value Ref Range    POC Glucose 93 65 - 140 mg/dl   CBC    Collection Time: 10/13/24  9:58 AM   Result Value Ref Range    WBC 7.26 4.31 - 10.16 Thousand/uL    RBC 2.65 (L) 3.88 - 5.62 Million/uL    Hemoglobin 7.8 (L) 12.0 - 17.0 g/dL    Hematocrit 24.3 (L) 36.5 - 49.3 %    MCV 92 82 - 98 fL    MCH 29.4 26.8 - 34.3 pg    MCHC 32.1 31.4 - 37.4 g/dL    RDW 16.0 (H) 11.6 - 15.1 %    Platelets 215 149 - 390 Thousands/uL    MPV 9.5 8.9 - 12.7 fL   Basic metabolic panel    Collection Time: 10/13/24  9:58 AM   Result Value Ref Range    Sodium 133 (L) 135 - 147 mmol/L    Potassium 4.1 3.5 - 5.3 mmol/L    Chloride 95 (L) 96 - 108 mmol/L    CO2 26 21 - 32 mmol/L    ANION GAP 12 4 - 13  mmol/L    BUN 25 5 - 25 mg/dL    Creatinine 4.93 (H) 0.60 - 1.30 mg/dL    Glucose 94 65 - 140 mg/dL    Calcium 7.7 (L) 8.4 - 10.2 mg/dL    eGFR 11 ml/min/1.73sq m   Folate    Collection Time: 10/13/24  9:58 AM   Result Value Ref Range    Folate >22.3 >5.9 ng/mL   Vitamin B12    Collection Time: 10/13/24  9:58 AM   Result Value Ref Range    Vitamin B-12 364 180 - 914 pg/mL   TIBC Panel (incl. Iron, TIBC, % Iron Saturation)    Collection Time: 10/13/24  9:58 AM   Result Value Ref Range    Iron Saturation 39 15 - 50 %    TIBC 184 (L) 250 - 450 ug/dL    Iron 72 50 - 212 ug/dL    UIBC 112 (L) 155 - 355 ug/dL   Ferritin    Collection Time: 10/13/24  9:58 AM   Result Value Ref Range    Ferritin 640 (H) 24 - 336 ng/mL   Fingerstick Glucose (POCT)    Collection Time: 10/13/24 11:12 AM   Result Value Ref Range    POC Glucose 114 65 - 140 mg/dl   Fingerstick Glucose (POCT)    Collection Time: 10/13/24  4:09 PM   Result Value Ref Range    POC Glucose 116 65 - 140 mg/dl   Fingerstick Glucose (POCT)    Collection Time: 10/13/24  8:48 PM   Result Value Ref Range    POC Glucose 125 65 - 140 mg/dl   CBC    Collection Time: 10/14/24  6:14 AM   Result Value Ref Range    WBC 6.19 4.31 - 10.16 Thousand/uL    RBC 2.73 (L) 3.88 - 5.62 Million/uL    Hemoglobin 8.1 (L) 12.0 - 17.0 g/dL    Hematocrit 25.2 (L) 36.5 - 49.3 %    MCV 92 82 - 98 fL    MCH 29.7 26.8 - 34.3 pg    MCHC 32.1 31.4 - 37.4 g/dL    RDW 16.0 (H) 11.6 - 15.1 %    Platelets 211 149 - 390 Thousands/uL    MPV 9.8 8.9 - 12.7 fL   Basic metabolic panel    Collection Time: 10/14/24  6:14 AM   Result Value Ref Range    Sodium 131 (L) 135 - 147 mmol/L    Potassium 4.5 3.5 - 5.3 mmol/L    Chloride 95 (L) 96 - 108 mmol/L    CO2 26 21 - 32 mmol/L    ANION GAP 10 4 - 13 mmol/L    BUN 38 (H) 5 - 25 mg/dL    Creatinine 6.44 (H) 0.60 - 1.30 mg/dL    Glucose 110 65 - 140 mg/dL    Calcium 7.6 (L) 8.4 - 10.2 mg/dL    eGFR 8 ml/min/1.73sq m   Fingerstick Glucose (POCT)    Collection Time:  10/14/24  7:36 AM   Result Value Ref Range    POC Glucose 112 65 - 140 mg/dl   Fingerstick Glucose (POCT)    Collection Time: 10/14/24 11:15 AM   Result Value Ref Range    POC Glucose 94 65 - 140 mg/dl   Fingerstick Glucose (POCT)    Collection Time: 10/14/24  4:02 PM   Result Value Ref Range    POC Glucose 132 65 - 140 mg/dl   Fingerstick Glucose (POCT)    Collection Time: 10/14/24  8:12 PM   Result Value Ref Range    POC Glucose 146 (H) 65 - 140 mg/dl   Fingerstick Glucose (POCT)    Collection Time: 10/15/24  7:27 AM   Result Value Ref Range    POC Glucose 107 65 - 140 mg/dl   Basic metabolic panel    Collection Time: 10/15/24  8:31 AM   Result Value Ref Range    Sodium 135 135 - 147 mmol/L    Potassium 4.3 3.5 - 5.3 mmol/L    Chloride 99 96 - 108 mmol/L    CO2 27 21 - 32 mmol/L    ANION GAP 9 4 - 13 mmol/L    BUN 49 (H) 5 - 25 mg/dL    Creatinine 7.85 (H) 0.60 - 1.30 mg/dL    Glucose 94 65 - 140 mg/dL    Calcium 7.7 (L) 8.4 - 10.2 mg/dL    eGFR 6 ml/min/1.73sq m   CBC    Collection Time: 10/15/24  8:31 AM   Result Value Ref Range    WBC 6.01 4.31 - 10.16 Thousand/uL    RBC 2.58 (L) 3.88 - 5.62 Million/uL    Hemoglobin 7.6 (L) 12.0 - 17.0 g/dL    Hematocrit 23.7 (L) 36.5 - 49.3 %    MCV 92 82 - 98 fL    MCH 29.5 26.8 - 34.3 pg    MCHC 32.1 31.4 - 37.4 g/dL    RDW 15.9 (H) 11.6 - 15.1 %    Platelets 213 149 - 390 Thousands/uL    MPV 9.6 8.9 - 12.7 fL   Magnesium    Collection Time: 10/15/24  8:31 AM   Result Value Ref Range    Magnesium 1.9 1.9 - 2.7 mg/dL   Fingerstick Glucose (POCT)    Collection Time: 10/15/24 11:42 AM   Result Value Ref Range    POC Glucose 92 65 - 140 mg/dl   Fingerstick Glucose (POCT)    Collection Time: 10/15/24  3:53 PM   Result Value Ref Range    POC Glucose 121 65 - 140 mg/dl   Fingerstick Glucose (POCT)    Collection Time: 10/15/24  8:34 PM   Result Value Ref Range    POC Glucose 123 65 - 140 mg/dl   Basic metabolic panel    Collection Time: 10/16/24  3:55 AM   Result Value Ref Range     Sodium 134 (L) 135 - 147 mmol/L    Potassium 4.1 3.5 - 5.3 mmol/L    Chloride 98 96 - 108 mmol/L    CO2 27 21 - 32 mmol/L    ANION GAP 9 4 - 13 mmol/L    BUN 25 5 - 25 mg/dL    Creatinine 4.62 (H) 0.60 - 1.30 mg/dL    Glucose 106 65 - 140 mg/dL    Calcium 8.2 (L) 8.4 - 10.2 mg/dL    eGFR 12 ml/min/1.73sq m   CBC    Collection Time: 10/16/24  3:55 AM   Result Value Ref Range    WBC 6.82 4.31 - 10.16 Thousand/uL    RBC 2.63 (L) 3.88 - 5.62 Million/uL    Hemoglobin 7.8 (L) 12.0 - 17.0 g/dL    Hematocrit 24.4 (L) 36.5 - 49.3 %    MCV 93 82 - 98 fL    MCH 29.7 26.8 - 34.3 pg    MCHC 32.0 31.4 - 37.4 g/dL    RDW 16.1 (H) 11.6 - 15.1 %    Platelets 210 149 - 390 Thousands/uL    MPV 9.3 8.9 - 12.7 fL   Fingerstick Glucose (POCT)    Collection Time: 10/16/24  7:16 AM   Result Value Ref Range    POC Glucose 100 65 - 140 mg/dl   Fingerstick Glucose (POCT)    Collection Time: 10/16/24 11:50 AM   Result Value Ref Range    POC Glucose 111 65 - 140 mg/dl   Fingerstick Glucose (POCT)    Collection Time: 10/16/24  4:15 PM   Result Value Ref Range    POC Glucose 135 65 - 140 mg/dl   Hemoglobin and hematocrit, blood    Collection Time: 10/16/24  8:05 PM   Result Value Ref Range    Hemoglobin 7.6 (L) 12.0 - 17.0 g/dL    Hematocrit 24.4 (L) 36.5 - 49.3 %   Fingerstick Glucose (POCT)    Collection Time: 10/16/24  8:34 PM   Result Value Ref Range    POC Glucose 141 (H) 65 - 140 mg/dl   Fingerstick Glucose (POCT)    Collection Time: 10/17/24  7:25 AM   Result Value Ref Range    POC Glucose 110 65 - 140 mg/dl   Magnesium    Collection Time: 10/17/24  7:52 AM   Result Value Ref Range    Magnesium 1.8 (L) 1.9 - 2.7 mg/dL   Basic metabolic panel    Collection Time: 10/17/24  7:52 AM   Result Value Ref Range    Sodium 136 135 - 147 mmol/L    Potassium 4.4 3.5 - 5.3 mmol/L    Chloride 99 96 - 108 mmol/L    CO2 23 21 - 32 mmol/L    ANION GAP 14 (H) 4 - 13 mmol/L    BUN 40 (H) 5 - 25 mg/dL    Creatinine 6.25 (H) 0.60 - 1.30 mg/dL    Glucose 106 65  - 140 mg/dL    Calcium 8.0 (L) 8.4 - 10.2 mg/dL    eGFR 8 ml/min/1.73sq m   Hemoglobin and hematocrit, blood    Collection Time: 10/17/24  7:52 AM   Result Value Ref Range    Hemoglobin 7.7 (L) 12.0 - 17.0 g/dL    Hematocrit 24.8 (L) 36.5 - 49.3 %   Fingerstick Glucose (POCT)    Collection Time: 10/17/24 11:06 AM   Result Value Ref Range    POC Glucose 113 65 - 140 mg/dl   Urinalysis with microscopic    Collection Time: 10/17/24 12:17 PM   Result Value Ref Range    Color, UA Light Yellow     Clarity, UA Slightly Cloudy     Specific [place], UA 1.015 1.005 - 1.030    pH, UA 7.5 4.5, 5.0, 5.5, 6.0, 6.5, 7.0, 7.5, 8.0    Leukocytes, UA Trace (A) Negative    Nitrite, UA Negative Negative    Protein,  (3+) (A) Negative mg/dl    Glucose, UA 70 (7/100%) (A) Negative mg/dl    Ketones, UA Negative Negative mg/dl    Urobilinogen, UA <2.0 <2.0 mg/dl mg/dl    Bilirubin, UA Negative Negative    Occult Blood, UA Negative Negative    RBC, UA 2-4 None Seen, 2-4 /hpf    WBC, UA 2-4 None Seen, 2-4, 5-60 /hpf    Epithelial Cells Occasional None Seen, Occasional /hpf    Bacteria, UA Occasional None Seen, Occasional /hpf    Amorphous Crystals, UA Occasional    Fingerstick Glucose (POCT)    Collection Time: 10/17/24  4:48 PM   Result Value Ref Range    POC Glucose 131 65 - 140 mg/dl   Fingerstick Glucose (POCT)    Collection Time: 10/17/24  7:53 PM   Result Value Ref Range    POC Glucose 151 (H) 65 - 140 mg/dl   Phosphorus    Collection Time: 10/18/24  5:55 AM   Result Value Ref Range    Phosphorus 3.3 2.3 - 4.1 mg/dL   Magnesium    Collection Time: 10/18/24  5:55 AM   Result Value Ref Range    Magnesium 1.8 (L) 1.9 - 2.7 mg/dL   CBC and differential    Collection Time: 10/18/24  5:55 AM   Result Value Ref Range    WBC 7.82 4.31 - 10.16 Thousand/uL    RBC 2.74 (L) 3.88 - 5.62 Million/uL    Hemoglobin 8.2 (L) 12.0 - 17.0 g/dL    Hematocrit 25.7 (L) 36.5 - 49.3 %    MCV 94 82 - 98 fL    MCH 29.9 26.8 - 34.3 pg    MCHC 31.9 31.4 -  37.4 g/dL    RDW 16.1 (H) 11.6 - 15.1 %    MPV 9.7 8.9 - 12.7 fL    Platelets 285 149 - 390 Thousands/uL    nRBC 0 /100 WBCs    Segmented % 65 43 - 75 %    Immature Grans % 1 0 - 2 %    Lymphocytes % 18 14 - 44 %    Monocytes % 9 4 - 12 %    Eosinophils Relative 7 (H) 0 - 6 %    Basophils Relative 0 0 - 1 %    Absolute Neutrophils 5.07 1.85 - 7.62 Thousands/µL    Absolute Immature Grans 0.04 0.00 - 0.20 Thousand/uL    Absolute Lymphocytes 1.41 0.60 - 4.47 Thousands/µL    Absolute Monocytes 0.73 0.17 - 1.22 Thousand/µL    Eosinophils Absolute 0.54 0.00 - 0.61 Thousand/µL    Basophils Absolute 0.03 0.00 - 0.10 Thousands/µL   Basic metabolic panel    Collection Time: 10/18/24  5:55 AM   Result Value Ref Range    Sodium 132 (L) 135 - 147 mmol/L    Potassium 4.2 3.5 - 5.3 mmol/L    Chloride 96 96 - 108 mmol/L    CO2 27 21 - 32 mmol/L    ANION GAP 9 4 - 13 mmol/L    BUN 27 (H) 5 - 25 mg/dL    Creatinine 4.35 (H) 0.60 - 1.30 mg/dL    Glucose 106 65 - 140 mg/dL    Calcium 8.6 8.4 - 10.2 mg/dL    eGFR 13 ml/min/1.73sq m   Protime-INR    Collection Time: 10/18/24  5:55 AM   Result Value Ref Range    Protime 13.2 12.3 - 15.0 seconds    INR 0.95 0.85 - 1.19   Fingerstick Glucose (POCT)    Collection Time: 10/18/24  7:18 AM   Result Value Ref Range    POC Glucose 106 65 - 140 mg/dl         Incidental Findings:   Nne    I reviewed the above mentioned incidental findings with the patient and/or family and they expressed understanding.    Test Results Pending at Discharge (will require follow up):   None      Outpatient Tests Requested:  None     Complications:  none     Reason for Admission: ESRD , missed dialysis sessions , fall    Hospital Course:   Naresh Carpio is a 64 y.o. male patient who originally presented to the hospital on 10/8/2024 due to end-stage renal disease with missed dialysis sessions and recurrent falls.  During the hospitalization the patient received dialysis sessions.  Nephrology with electrolyte/fluid  "optimization successfully.  Case management were consulted given the patient's poor compliance with dialysis sessions.  Patient reports wanting to go to [facility] dialysis center and he confirms that he will be coming per schedule TTS with the next scheduled dialysis tomorrow on 10/19.  Confirmed appointment per case management.   Neurology and critical care team are consulted during the hospitalization given acute encephalopathy.  MRI was completed with no acute findings.  Patient was loaded with TN K x 1 with complete resolution of symptoms over 24-hour period.  Moreover, he developed gross hematuria afterwards.  DVT PPE and aspirin were held with clearance of hematuria.  Urology team are consulted and recommended outpatient follow-up for cystoscopy.  Psychiatry team were consulted given change in mental status initially.  Psychiatry with no medication changes or further recommendations at this time.  Patient is being discharged to home in a stable state and will follow-up with nephrology/PCP.       Hospital Course: No notes on file      Please see above list of diagnoses and related plan for additional information.     Condition at Discharge: good    Discharge Day Visit / Exam:   Subjective: Patient was seen today at bedside.  He is feeling well.  Denies any active complaints.      No chest pain or tightness, SOB or cough, dizziness or light headedness, N/V, Diarrhea of constipation.   No active urinary symptoms  Tolerating oral diet.     Vitals: Blood Pressure: 147/60 (10/18/24 0823)  Pulse: 66 (10/18/24 0823)  Temperature: 98.5 °F (36.9 °C) (10/18/24 0823)  Temp Source: Oral (10/17/24 1630)  Respirations: 18 (10/17/24 2304)  Height: 6' 1\" (185.4 cm) (10/09/24 0739)  Weight - Scale: 96.9 kg (213 lb 9.6 oz) (10/18/24 0600)  SpO2: 96 % (10/18/24 0823)  Physical Exam  Vitals and nursing note reviewed.   Constitutional:       General: He is not in acute distress.     Appearance: Normal appearance.   HENT:      Head: " Normocephalic and atraumatic.      Mouth/Throat:      Mouth: Mucous membranes are moist.   Eyes:      Conjunctiva/sclera: Conjunctivae normal.      Pupils: Pupils are equal, round, and reactive to light.   Cardiovascular:      Rate and Rhythm: Normal rate and regular rhythm.      Pulses: Normal pulses.           Carotid pulses are 2+ on the right side and 2+ on the left side.       Radial pulses are 2+ on the right side and 2+ on the left side.        Dorsalis pedis pulses are 2+ on the right side and 2+ on the left side.      Heart sounds: Normal heart sounds, S1 normal and S2 normal. No murmur heard.  Pulmonary:      Effort: No tachypnea, bradypnea or accessory muscle usage.      Breath sounds: Normal breath sounds and air entry. No decreased breath sounds, wheezing, rhonchi or rales.   Abdominal:      General: Abdomen is flat. Bowel sounds are normal.      Palpations: Abdomen is soft.   Musculoskeletal:      Cervical back: Normal range of motion and neck supple.      Right lower leg: No edema.      Left lower leg: No edema.   Skin:     Capillary Refill: Capillary refill takes less than 2 seconds.   Neurological:      Mental Status: He is alert and oriented to person, place, and time. Mental status is at baseline.   Psychiatric:         Mood and Affect: Mood normal.         Behavior: Behavior normal.          Discussion with Family: Updated  (son) via phone.    Discharge instructions/Information to patient and family:   See after visit summary for information provided to patient and family.      Provisions for Follow-Up Care:  See after visit summary for information related to follow-up care and any pertinent home health orders.      Mobility at time of Discharge:   Basic Mobility Inpatient Raw Score: 22  JH-HLM Goal: 7: Walk 25 feet or more  JH-HLM Achieved: 8: Walk 250 feet ot more  HLM Goal achieved. Continue to encourage appropriate mobility.     Disposition:   Home    Planned Readmission: none      Discharge Medications:  See after visit summary for reconciled discharge medications provided to patient and/or family.      Administrative Statements   Discharge Statement:  I have spent a total time of 30 minutes in caring for this patient on the day of the visit/encounter. >30 minutes of time was spent on: Diagnostic results, Prognosis, Risks and benefits of tx options, and Instructions for management.    **Please Note: This note may have been constructed using a voice recognition system**

## 2024-10-18 NOTE — OCCUPATIONAL THERAPY NOTE
OT TREATMENT         10/18/24 1133   OT Last Visit   OT Visit Date 10/18/24   Note Type   Note Type Treatment   Pain Assessment   Pain Assessment Tool 0-10   Pain Score 7   Pain Location/Orientation Orientation: Left;Location: Foot   Restrictions/Precautions   Other Precautions Fall Risk;Pain   ADL   Grooming Assistance 5  Supervision/Setup   Grooming Deficit Wash/dry hands;Standing with assistive device   LB Dressing Assistance   (pt declined LB dressing today)   Toileting Assistance    (pt refused not needing to void, declined toilet transfer)   Transfers   Sit to Stand 5  Supervision   Stand to Sit 5  Supervision   Functional Mobility   Functional Mobility 5  Supervision   Additional Comments household distance x 2 with sitting rest break between   Cognition   Overall Cognitive Status WFL   Arousal/Participation Cooperative   Attention Within functional limits   Orientation Level Oriented X4   Following Commands Follows all commands and directions without difficulty   Assessment   Assessment Patient seen for OT treatment.  Patient agreeable to functional mobility today and completed with supervision.  Patient declined further ADLS.  Patient will benefit from continued OT services to maximize functional performance with ADLS. The patient's raw score on the AM-PAC Daily Activity Inpatient Short Form is 21. A raw score of greater than or equal to 19 suggests the patient may benefit from discharge to home. Please refer to the recommendation of the Occupational Therapist for safe discharge planning.   Plan   Treatment Interventions ADL retraining;Functional transfer training;Endurance training;Activityengagement   OT Frequency 3-5x/wk   Discharge Recommendation   Rehab Resource Intensity Level, OT III (Minimum Resource Intensity)   -PAC Daily Activity Inpatient   Lower Body Dressing 3   Bathing 3   Toileting 3   Upper Body Dressing 4   Grooming 4   Eating 4   Daily Activity Raw Score 21   Daily Activity Standardized  Score (Calc for Raw Score >=11) 44.27   AM-PAC Applied Cognition Inpatient   Following a Speech/Presentation 4   Understanding Ordinary Conversation 4   Taking Medications 4   Remembering Where Things Are Placed or Put Away 4   Remembering List of 4-5 Errands 4   Taking Care of Complicated Tasks 4   Applied Cognition Raw Score 24   Applied Cognition Standardized Score 62.21   Licensure   [place] License Number  Lizeth Sharpneda BARNES OTR/L [id number]

## 2024-10-18 NOTE — PROGRESS NOTES
Progress Note - Hospitalist   Name: Naresh Carpio 64 y.o. male I MRN: [id number]  Unit/Bed#: 4 [place] [address]01 I Date of Admission: 10/8/2024   Date of Service: 10/18/2024 I Hospital Day: 9    Assessment & Plan  ESRD (end stage renal disease) ([facility])  Lab Results   Component Value Date    EGFR 13 10/18/2024    EGFR 8 10/17/2024    EGFR 12 10/16/2024    CREATININE 4.35 (H) 10/18/2024    CREATININE 6.25 (H) 10/17/2024    CREATININE 4.62 (H) 10/16/2024     Patient gets dialysis Tuesday, Thursday, Saturday and has missed dialysis for about a week since being discharged from short-term rehab.  Patient was scheduled for dialysis at a different facility but family stated that they cannot transport him for his appointments  Nephrology following  Continued on TTS schedule  Follows up with [facility] dialysis center.  Case management confirmed scheduling for dialysis center.  Continue Renagel for hyperphosphatemia  Next dialysis session on Saturday, 10/19/2024  Type 2 diabetes mellitus, without long-term current use of insulin ([facility])  Lab Results   Component Value Date    HGBA1C 5.5 10/08/2024       Recent Labs     10/17/24  1106 10/17/24  1648 10/17/24  1953 10/18/24  0718   POCGLU 113 131 151* 106   SSI  Diabetic diet  Status post fall  CT head, CT c-spine, and XR R knee negative for acute abnormality on admission  PT/OT saw patient again on 10/15 as patient was seen concerns regarding ability to climb 8 steps into the home.  PT indicated that patient would have difficulty navigating stairs due to deconditioning, recommending short-term rehab  Discussed with case management  Fall precautions  PVD (peripheral vascular disease) ([facility])  Continue statin  Hypertension  Continue nifedipine 60 mg p.o. 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, on chronic dialysis ([facility])  Lab Results   Component Value Date    EGFR 13 10/18/2024    EGFR 8 10/17/2024    EGFR 12 10/16/2024    CREATININE  4.35 (H) 10/18/2024    CREATININE 6.25 (H) 10/17/2024    CREATININE 4.62 (H) 10/16/2024   Patient underwent dialysis on 10/15, continue TTS schedule  Acute cystitis  Updated UA on 10/17; leukocytes  Patient reports dysuria      -Start ceftriaxone  -Follow-up accordingly    Hematuria (Resolved: 10/18/2024)  Patient initially had gaitan placed for urinary retention, requested it be removed  Noted to have hematuria after gaitan removal with clots  Urinary retention protocol  Aspirin and DVT prophylaxis were held.   Urology consulted  Patient notes clear urine since 10/16 atleast.   Restart ASA and DVT PPE 10/16  Stable H and H .  Hemoglobin 8 today at baseline.  Recheck UA on 10/17; no blood.     AMB follow up with urology for cystoscopy   Hematuria resolved      VTE Pharmacologic Prophylaxis: VTE Score: 2 Moderate Risk (Score 3-4) - Pharmacological DVT Prophylaxis Ordered: heparin.    Mobility:   Basic Mobility Inpatient Raw Score: 22  JH-HLM Goal: 7: Walk 25 feet or more  JH-HLM Achieved: 8: Walk 250 feet ot more  JH-HLM Goal achieved. Continue to encourage appropriate mobility.    Patient Centered Rounds: I performed bedside rounds with nursing staff today.   Discussions with Specialists or Other Care Team Provider: Nephrology    Education and Discussions with Family / Patient:  Patient.     Current Length of Stay: 9 day(s)  Current Patient Status: Inpatient   Certification Statement: The patient will continue to require additional inpatient hospital stay due to end-stage renal disease, acute cystitis  Discharge Plan: Anticipate discharge tomorrow to home.    Code Status: Level 1 - Full Code    Subjective   Patient was seen today at bedside.  He is feeling well.  Reports burning with urination over the past 24 hours.  No fevers or chills.  No chest pain or tightness, shortness of breath or cough.  Tolerating oral diet.      Objective :  Temp:  [98.5 °F (36.9 °C)-99.3 °F (37.4 °C)] 98.5 °F (36.9 °C)  HR:  [57-75]  66  BP: (147-168)/(54-68) 147/60  Resp:  [16-18] 18  SpO2:  [95 %-98 %] 96 %  O2 Device: None (Room air)    Body mass index is 28.18 kg/m².     Input and Output Summary (last 24 hours):     Intake/Output Summary (Last 24 hours) at 10/18/2024 1036  Last data filed at 10/17/2024 2013  Gross per 24 hour   Intake 620 ml   Output 2600 ml   Net -1980 ml       Physical Exam  Vitals and nursing note reviewed.   Constitutional:       General: He is not in acute distress.     Appearance: Normal appearance.   HENT:      Head: Normocephalic and atraumatic.      Mouth/Throat:      Mouth: Mucous membranes are moist.   Eyes:      Conjunctiva/sclera: Conjunctivae normal.      Pupils: Pupils are equal, round, and reactive to light.   Cardiovascular:      Rate and Rhythm: Normal rate and regular rhythm.      Pulses: Normal pulses.           Carotid pulses are 2+ on the right side and 2+ on the left side.       Radial pulses are 2+ on the right side and 2+ on the left side.        Dorsalis pedis pulses are 2+ on the right side and 2+ on the left side.      Heart sounds: Normal heart sounds, S1 normal and S2 normal. No murmur heard.  Pulmonary:      Effort: No tachypnea, bradypnea or accessory muscle usage.      Breath sounds: Normal breath sounds and air entry. No decreased breath sounds, wheezing, rhonchi or rales.   Abdominal:      General: Abdomen is flat. Bowel sounds are normal. There is no distension.      Palpations: Abdomen is soft.      Tenderness: There is no abdominal tenderness.   Musculoskeletal:      Right lower leg: No edema.      Left lower leg: No edema.   Skin:     Capillary Refill: Capillary refill takes less than 2 seconds.   Neurological:      Mental Status: He is alert and oriented to person, place, and time. Mental status is at baseline.   Psychiatric:         Mood and Affect: Mood normal.         Behavior: Behavior normal.           Lines/Drains:  Lines/Drains/Airways       Active Status       Name Placement  date Placement time Site Days    HD Permanent Double Catheter --  --  Internal jugular  --                            Lab Results: I have reviewed the following results:   Results from last 7 days   Lab Units 10/18/24  0555   WBC Thousand/uL 7.82   HEMOGLOBIN g/dL 8.2*   HEMATOCRIT % 25.7*   PLATELETS Thousands/uL 285   SEGS PCT % 65   LYMPHO PCT % 18   MONO PCT % 9   EOS PCT % 7*     Results from last 7 days   Lab Units 10/18/24  0555   SODIUM mmol/L 132*   POTASSIUM mmol/L 4.2   CHLORIDE mmol/L 96   CO2 mmol/L 27   BUN mg/dL 27*   CREATININE mg/dL 4.35*   ANION GAP mmol/L 9   CALCIUM mg/dL 8.6   GLUCOSE RANDOM mg/dL 106     Results from last 7 days   Lab Units 10/18/24  0555   INR  0.95     Results from last 7 days   Lab Units 10/18/24  0718 10/17/24  1953 10/17/24  1648 10/17/24  1106 10/17/24  0725 10/16/24  2034 10/16/24  1615 10/16/24  1150 10/16/24  0716 10/15/24  2034 10/15/24  1553 10/15/24  1142   POC GLUCOSE mg/dl 106 151* 131 113 110 141* 135 111 100 123 121 92               Recent Cultures (last 7 days):         Imaging Results Review: No pertinent imaging studies reviewed.  Other Study Results Review: No additional pertinent studies reviewed.    Last 24 Hours Medication List:     Current Facility-Administered Medications:     acetaminophen (Ofirmev) injection 1,000 mg, Q6H PRN, Last Rate: 1,000 mg (10/15/24 2122)    allopurinol (ZYLOPRIM) tablet 100 mg, Daily    aspirin chewable tablet 81 mg, Daily    atorvastatin (LIPITOR) tablet 40 mg, Daily With Dinner    cefTRIAXone (ROCEPHIN) IVPB (premix in dextrose) 1,000 mg 50 mL, Q24H    docusate sodium (COLACE) capsule 100 mg, BID    epoetin jessica (EPOGEN,PROCRIT) injection 3,000 Units, Once per day on Tuesday Thursday Saturday    escitalopram (LEXAPRO) tablet 10 mg, Daily    gabapentin (NEURONTIN) capsule 300 mg, BID    heparin (porcine) subcutaneous injection 5,000 Units, Q8H KEMAL    hydrALAZINE (APRESOLINE) injection 10 mg, Q6H PRN    influenza vaccine,  high-dose (Fluzone High-Dose) IM injection 0.5 mL, Once PRN    insulin lispro (HumALOG/ADMELOG) 100 units/mL subcutaneous injection 1-6 Units, TID AC **AND** Fingerstick Glucose (POCT), TID AC    labetalol (NORMODYNE) injection 10 mg, Q4H PRN    NIFEdipine (PROCARDIA XL) 24 hr tablet 60 mg, After Dinner    ondansetron (ZOFRAN) injection 4 mg, Q6H PRN    pantoprazole (PROTONIX) injection 40 mg, Q12H KEMAL    polyethylene glycol (MIRALAX) packet 17 g, Daily PRN    sodium hypochlorite (DAKIN'S HALF-STRENGTH) 0.25 percent topical solution 1 Application, Daily    Administrative Statements   Today, Patient Was Seen By: Nirav Bruce MD  I have spent a total time of 30 minutes in caring for this patient on the day of the visit/encounter including Diagnostic results, Prognosis, Risks and benefits of tx options, and Instructions for management.    **Please Note: This note may have been constructed using a voice recognition system.**

## 2024-10-18 NOTE — ASSESSMENT & PLAN NOTE
Lab Results   Component Value Date    EGFR 13 10/18/2024    EGFR 8 10/17/2024    EGFR 12 10/16/2024    CREATININE 4.35 (H) 10/18/2024    CREATININE 6.25 (H) 10/17/2024    CREATININE 4.62 (H) 10/16/2024   Patient underwent dialysis on 10/15, continue TTS schedule

## 2024-10-18 NOTE — ASSESSMENT & PLAN NOTE
Updated UA on 10/17; leukocytes  Patient reports dysuria      -Start ceftriaxone  -Follow-up accordingly

## 2024-10-18 NOTE — PHYSICAL THERAPY NOTE
PHYSICAL THERAPY     10/18/24 1445   Note Type   Note Type Cancelled Session   Cancel Reasons Refusal  (patient deferred mobility at this time due to recently receiving pain meds and not sure how that will effect him. will re-attempt at a later time as patient agreeable)   Licensure   [place] License Number  Anat Lukasz  [id number]

## 2024-10-18 NOTE — ASSESSMENT & PLAN NOTE
Current hemoglobin: 8.2  mg/dL  Multifactorial secondary to ESRD   Treatment:  Epogen on HD today 3000   transfuse for hemoglobin less than 7.0 per primary service

## 2024-10-18 NOTE — ASSESSMENT & PLAN NOTE
Patient initially had gaitan placed for urinary retention, requested it be removed  Noted to have hematuria after gaitan removal with clots  Urinary retention protocol  Aspirin and DVT prophylaxis were held.   Urology consulted  Patient notes clear urine since 10/16 atleast.   Restart ASA and DVT PPE 10/16  Stable H and H .  Hemoglobin 8 today at baseline.  Recheck UA on 10/17; no blood.    AMB follow up with urology for cystoscopy   Hematuria resolved     No

## 2024-10-18 NOTE — ASSESSMENT & PLAN NOTE
Lab Results   Component Value Date    HGBA1C 5.5 10/08/2024       Recent Labs     10/17/24  1106 10/17/24  1648 10/17/24  1953 10/18/24  0718   POCGLU 113 131 151* 106   SSI  Diabetic diet

## 2024-10-18 NOTE — ASSESSMENT & PLAN NOTE
Lab Results   Component Value Date    EGFR 13 10/18/2024    EGFR 8 10/17/2024    EGFR 12 10/16/2024    CREATININE 4.35 (H) 10/18/2024    CREATININE 6.25 (H) 10/17/2024    CREATININE 4.62 (H) 10/16/2024   ESRD on HD TTS:  Dialysis unit/days:[facility]   Access: PermCath  Will continue as she has per schedule, next dialysis tomorrow if remains inpatient  Renal Diet  Fluid restriction  1.8 L  Adjust medications to GFR<10  Avoid opioids   Continue HD as per schedule TTS  Stable for discharge from my end

## 2024-10-19 ENCOUNTER — APPOINTMENT (INPATIENT)
Dept: DIALYSIS | Facility: HOSPITAL | Age: 64
DRG: 640 | End: 2024-10-19
Payer: MEDICARE

## 2024-10-19 PROBLEM — E87.1 HYPONATREMIA: Status: ACTIVE | Noted: 2024-10-19

## 2024-10-19 LAB
ANION GAP SERPL CALCULATED.3IONS-SCNC: 11 MMOL/L (ref 4–13)
BASOPHILS # BLD AUTO: 0.03 THOUSANDS/ΜL (ref 0–0.1)
BASOPHILS NFR BLD AUTO: 1 % (ref 0–1)
BUN SERPL-MCNC: 43 MG/DL (ref 5–25)
CALCIUM SERPL-MCNC: 8.1 MG/DL (ref 8.4–10.2)
CHLORIDE SERPL-SCNC: 94 MMOL/L (ref 96–108)
CO2 SERPL-SCNC: 26 MMOL/L (ref 21–32)
CREAT SERPL-MCNC: 5.79 MG/DL (ref 0.6–1.3)
EOSINOPHIL # BLD AUTO: 0.56 THOUSAND/ΜL (ref 0–0.61)
EOSINOPHIL NFR BLD AUTO: 8 % (ref 0–6)
ERYTHROCYTE [DISTWIDTH] IN BLOOD BY AUTOMATED COUNT: 16.2 % (ref 11.6–15.1)
GFR SERPL CREATININE-BSD FRML MDRD: 9 ML/MIN/1.73SQ M
GLUCOSE SERPL-MCNC: 101 MG/DL (ref 65–140)
GLUCOSE SERPL-MCNC: 102 MG/DL (ref 65–140)
GLUCOSE SERPL-MCNC: 105 MG/DL (ref 65–140)
GLUCOSE SERPL-MCNC: 112 MG/DL (ref 65–140)
GLUCOSE SERPL-MCNC: 156 MG/DL (ref 65–140)
HCT VFR BLD AUTO: 26 % (ref 36.5–49.3)
HGB BLD-MCNC: 8.2 G/DL (ref 12–17)
IMM GRANULOCYTES # BLD AUTO: 0.04 THOUSAND/UL (ref 0–0.2)
IMM GRANULOCYTES NFR BLD AUTO: 1 % (ref 0–2)
INR PPP: 0.91 (ref 0.85–1.19)
LYMPHOCYTES # BLD AUTO: 1.38 THOUSANDS/ΜL (ref 0.6–4.47)
LYMPHOCYTES NFR BLD AUTO: 21 % (ref 14–44)
MAGNESIUM SERPL-MCNC: 1.9 MG/DL (ref 1.9–2.7)
MCH RBC QN AUTO: 29.6 PG (ref 26.8–34.3)
MCHC RBC AUTO-ENTMCNC: 31.5 G/DL (ref 31.4–37.4)
MCV RBC AUTO: 94 FL (ref 82–98)
MONOCYTES # BLD AUTO: 0.7 THOUSAND/ΜL (ref 0.17–1.22)
MONOCYTES NFR BLD AUTO: 11 % (ref 4–12)
NEUTROPHILS # BLD AUTO: 3.95 THOUSANDS/ΜL (ref 1.85–7.62)
NEUTS SEG NFR BLD AUTO: 58 % (ref 43–75)
NRBC BLD AUTO-RTO: 0 /100 WBCS
PHOSPHATE SERPL-MCNC: 4.9 MG/DL (ref 2.3–4.1)
PLATELET # BLD AUTO: 269 THOUSANDS/UL (ref 149–390)
PMV BLD AUTO: 9.6 FL (ref 8.9–12.7)
POTASSIUM SERPL-SCNC: 4.7 MMOL/L (ref 3.5–5.3)
PROTHROMBIN TIME: 12.8 SECONDS (ref 12.3–15)
RBC # BLD AUTO: 2.77 MILLION/UL (ref 3.88–5.62)
SODIUM SERPL-SCNC: 131 MMOL/L (ref 135–147)
WBC # BLD AUTO: 6.66 THOUSAND/UL (ref 4.31–10.16)

## 2024-10-19 PROCEDURE — 99232 SBSQ HOSP IP/OBS MODERATE 35: CPT | Performed by: STUDENT IN AN ORGANIZED HEALTH CARE EDUCATION/TRAINING PROGRAM

## 2024-10-19 PROCEDURE — 80048 BASIC METABOLIC PNL TOTAL CA: CPT

## 2024-10-19 PROCEDURE — 85610 PROTHROMBIN TIME: CPT

## 2024-10-19 PROCEDURE — 84100 ASSAY OF PHOSPHORUS: CPT

## 2024-10-19 PROCEDURE — 99232 SBSQ HOSP IP/OBS MODERATE 35: CPT

## 2024-10-19 PROCEDURE — 82948 REAGENT STRIP/BLOOD GLUCOSE: CPT

## 2024-10-19 PROCEDURE — 83735 ASSAY OF MAGNESIUM: CPT

## 2024-10-19 PROCEDURE — 85025 COMPLETE CBC W/AUTO DIFF WBC: CPT

## 2024-10-19 RX ORDER — HYDROMORPHONE HCL IN WATER/PF 6 MG/30 ML
0.2 PATIENT CONTROLLED ANALGESIA SYRINGE INTRAVENOUS ONCE
Status: COMPLETED | OUTPATIENT
Start: 2024-10-19 | End: 2024-10-19

## 2024-10-19 RX ADMIN — HEPARIN SODIUM 5000 UNITS: 5000 INJECTION, SOLUTION INTRAVENOUS; SUBCUTANEOUS at 22:07

## 2024-10-19 RX ADMIN — GABAPENTIN 300 MG: 300 CAPSULE ORAL at 09:38

## 2024-10-19 RX ADMIN — HYDROMORPHONE HYDROCHLORIDE 0.2 MG: 0.2 INJECTION, SOLUTION INTRAMUSCULAR; INTRAVENOUS; SUBCUTANEOUS at 23:08

## 2024-10-19 RX ADMIN — DOCUSATE SODIUM 100 MG: 100 CAPSULE, LIQUID FILLED ORAL at 17:58

## 2024-10-19 RX ADMIN — HYDROMORPHONE HYDROCHLORIDE 0.2 MG: 0.2 INJECTION, SOLUTION INTRAMUSCULAR; INTRAVENOUS; SUBCUTANEOUS at 16:04

## 2024-10-19 RX ADMIN — CEFTRIAXONE 1000 MG: 1 INJECTION, SOLUTION INTRAVENOUS at 17:59

## 2024-10-19 RX ADMIN — DOCUSATE SODIUM 100 MG: 100 CAPSULE, LIQUID FILLED ORAL at 09:38

## 2024-10-19 RX ADMIN — GABAPENTIN 300 MG: 300 CAPSULE ORAL at 17:58

## 2024-10-19 RX ADMIN — PANTOPRAZOLE SODIUM 40 MG: 40 INJECTION, POWDER, FOR SOLUTION INTRAVENOUS at 20:32

## 2024-10-19 RX ADMIN — EPOETIN ALFA 3000 UNITS: 3000 SOLUTION INTRAVENOUS; SUBCUTANEOUS at 17:23

## 2024-10-19 RX ADMIN — PANTOPRAZOLE SODIUM 40 MG: 40 INJECTION, POWDER, FOR SOLUTION INTRAVENOUS at 09:38

## 2024-10-19 RX ADMIN — ASPIRIN 81 MG CHEWABLE TABLET 81 MG: 81 TABLET CHEWABLE at 09:38

## 2024-10-19 RX ADMIN — HEPARIN SODIUM 5000 UNITS: 5000 INJECTION, SOLUTION INTRAVENOUS; SUBCUTANEOUS at 13:38

## 2024-10-19 RX ADMIN — ALLOPURINOL 100 MG: 100 TABLET ORAL at 09:38

## 2024-10-19 RX ADMIN — ATORVASTATIN CALCIUM 40 MG: 40 TABLET, FILM COATED ORAL at 17:58

## 2024-10-19 RX ADMIN — NIFEDIPINE 60 MG: 30 TABLET, EXTENDED RELEASE ORAL at 17:58

## 2024-10-19 RX ADMIN — ESCITALOPRAM OXALATE 10 MG: 10 TABLET ORAL at 09:38

## 2024-10-19 RX ADMIN — HEPARIN SODIUM 5000 UNITS: 5000 INJECTION, SOLUTION INTRAVENOUS; SUBCUTANEOUS at 06:05

## 2024-10-19 NOTE — ASSESSMENT & PLAN NOTE
Lab Results   Component Value Date    EGFR 13 10/18/2024    EGFR 8 10/17/2024    EGFR 12 10/16/2024    CREATININE 4.35 (H) 10/18/2024    CREATININE 6.25 (H) 10/17/2024    CREATININE 4.62 (H) 10/16/2024   Serum calcium 8.1 mg/dL   phosphorus at goal 5.4  No indication of binders at this time

## 2024-10-19 NOTE — ASSESSMENT & PLAN NOTE
Lab Results   Component Value Date    EGFR 9 10/19/2024    EGFR 13 10/18/2024    EGFR 8 10/17/2024    CREATININE 5.79 (H) 10/19/2024    CREATININE 4.35 (H) 10/18/2024    CREATININE 6.25 (H) 10/17/2024   Patient underwent dialysis on 10/15, continue TTS schedule

## 2024-10-19 NOTE — PROGRESS NOTES
Progress Note - Hospitalist   Name: Naresh Carpio 64 y.o. male I MRN: [id number]  Unit/Bed#: 4 [place] [address]01 I Date of Admission: 10/8/2024   Date of Service: 10/19/2024 I Hospital Day: 10    Assessment & Plan  ESRD (end stage renal disease) ([facility])  Lab Results   Component Value Date    EGFR 9 10/19/2024    EGFR 13 10/18/2024    EGFR 8 10/17/2024    CREATININE 5.79 (H) 10/19/2024    CREATININE 4.35 (H) 10/18/2024    CREATININE 6.25 (H) 10/17/2024     Patient gets dialysis Tuesday, Thursday, Saturday and has missed dialysis for about a week since being discharged from short-term rehab.  Patient was scheduled for dialysis at a different facility but family stated that they cannot transport him for his appointments  Nephrology following  Continued on TTS schedule  Follows up with [facility] dialysis center.  Case management confirmed scheduling for dialysis center.  Continue Renagel for hyperphosphatemia  Next dialysis session today   Type 2 diabetes mellitus, without long-term current use of insulin ([facility])  Lab Results   Component Value Date    HGBA1C 5.5 10/08/2024       Recent Labs     10/18/24  1108 10/18/24  1622 10/18/24  2132 10/19/24  0728   POCGLU 113 119 154* 102   SSI  Diabetic diet  Status post fall  CT head, CT c-spine, and XR R knee negative for acute abnormality on admission  PT/OT saw patient again on 10/15 as patient was seen concerns regarding ability to climb 8 steps into the home.  PT indicated that patient would have difficulty navigating stairs due to deconditioning, recommending short-term rehab  Discussed with case management  Fall precautions  PVD (peripheral vascular disease) ([facility])  Continue statin  Hypertension  Continue nifedipine 60 mg p.o. 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 labs      VTE Pharmacologic Prophylaxis: VTE Score: 2 Moderate Risk (Score 3-4) - Pharmacological DVT Prophylaxis Ordered: heparin.    Mobility:   Basic Mobility Inpatient Raw Score: 22  JH-HLM Goal: 7: Walk 25 feet or more  JH-HLM Achieved: 7: Walk 25 feet or more  JH-HLM Goal achieved. Continue to encourage appropriate mobility.    Patient Centered Rounds: I performed bedside rounds with nursing staff today.   Discussions with Specialists or Other Care Team Provider: nephrology     Education and Discussions with Family / Patient:  patient .     Current Length of Stay: 10 day(s)  Current Patient Status: Inpatient   Certification Statement: The patient will continue to require additional inpatient hospital stay due to ESRD , cystitis   Discharge Plan: Anticipate discharge tomorrow to home.    Code Status: Level 1 - Full Code    Subjective   Patient was seen today at bedside. Reports feeling better.   No chest pain or tightness, SOB or cough, dizziness or light headedness, N/V, Diarrhea of constipation.   No active urinary symptoms  Tolerating oral diet.       Objective :  Temp:  [97.9 °F (36.6 °C)-98.5 °F (36.9 °C)] 97.9 °F (36.6 °C)  HR:  [60-67] 63  BP: (145-152)/(59-62) 145/60  Resp:  [17-19] 19  SpO2:  [96 %-98 %] 98 %    Body mass index is 28.35 kg/m².     Input and Output Summary (last 24 hours):     Intake/Output Summary (Last 24 hours) at 10/19/2024 0912  Last data filed at 10/18/2024 1701  Gross per 24 hour   Intake 240 ml   Output 300 ml   Net -60 ml       Physical Exam  Vitals and nursing note reviewed.   Constitutional:       General: He is not in acute distress.     Appearance: Normal  appearance.   HENT:      Head: Normocephalic and atraumatic.      Mouth/Throat:      Mouth: Mucous membranes are moist.   Eyes:      Conjunctiva/sclera: Conjunctivae normal.      Pupils: Pupils are equal, round, and reactive to light.   Cardiovascular:      Rate and Rhythm: Normal rate and regular rhythm.      Pulses: Normal pulses.           Carotid pulses are 2+ on the right side and 2+ on the left side.       Radial pulses are 2+ on the right side and 2+ on the left side.        Dorsalis pedis pulses are 2+ on the right side and 2+ on the left side.      Heart sounds: Normal heart sounds, S1 normal and S2 normal. No murmur heard.  Pulmonary:      Effort: No tachypnea, bradypnea or accessory muscle usage.      Breath sounds: Normal breath sounds and air entry. No decreased breath sounds, wheezing, rhonchi or rales.   Abdominal:      General: Abdomen is flat. Bowel sounds are normal. There is no distension.      Palpations: Abdomen is soft.      Tenderness: There is no abdominal tenderness.   Musculoskeletal:      Right lower leg: No edema.      Left lower leg: No edema.   Skin:     Capillary Refill: Capillary refill takes less than 2 seconds.   Neurological:      Mental Status: He is alert and oriented to person, place, and time. Mental status is at baseline.   Psychiatric:         Mood and Affect: Mood normal.         Behavior: Behavior normal.           Lines/Drains:  Lines/Drains/Airways       Active Status       Name Placement date Placement time Site Days    HD Permanent Double Catheter --  --  Internal jugular  --                            Lab Results: I have reviewed the following results:   Results from last 7 days   Lab Units 10/19/24  0616   WBC Thousand/uL 6.66   HEMOGLOBIN g/dL 8.2*   HEMATOCRIT % 26.0*   PLATELETS Thousands/uL 269   SEGS PCT % 58   LYMPHO PCT % 21   MONO PCT % 11   EOS PCT % 8*     Results from last 7 days   Lab Units 10/19/24  0616   SODIUM mmol/L 131*   POTASSIUM mmol/L 4.7    CHLORIDE mmol/L 94*   CO2 mmol/L 26   BUN mg/dL 43*   CREATININE mg/dL 5.79*   ANION GAP mmol/L 11   CALCIUM mg/dL 8.1*   GLUCOSE RANDOM mg/dL 101     Results from last 7 days   Lab Units 10/19/24  0616   INR  0.91     Results from last 7 days   Lab Units 10/19/24  0728 10/18/24  2132 10/18/24  1622 10/18/24  1108 10/18/24  0718 10/17/24  1953 10/17/24  1648 10/17/24  1106 10/17/24  0725 10/16/24  2034 10/16/24  1615 10/16/24  1150   POC GLUCOSE mg/dl 102 154* 119 113 106 151* 131 113 110 141* 135 111               Recent Cultures (last 7 days):         Imaging Results Review: No pertinent imaging studies reviewed.  Other Study Results Review: EKG was reviewed.     Last 24 Hours Medication List:     Current Facility-Administered Medications:     acetaminophen (Ofirmev) injection 1,000 mg, Q6H PRN, Last Rate: 1,000 mg (10/15/24 2122)    allopurinol (ZYLOPRIM) tablet 100 mg, Daily    aspirin chewable tablet 81 mg, Daily    atorvastatin (LIPITOR) tablet 40 mg, Daily With Dinner    cefTRIAXone (ROCEPHIN) IVPB (premix in dextrose) 1,000 mg 50 mL, Q24H, Last Rate: 1,000 mg (10/18/24 1213)    docusate sodium (COLACE) capsule 100 mg, BID    epoetin jessica (EPOGEN,PROCRIT) injection 3,000 Units, Once per day on Tuesday Thursday Saturday    escitalopram (LEXAPRO) tablet 10 mg, Daily    gabapentin (NEURONTIN) capsule 300 mg, BID    heparin (porcine) subcutaneous injection 5,000 Units, Q8H KEMAL    hydrALAZINE (APRESOLINE) injection 10 mg, Q6H PRN    influenza vaccine, high-dose (Fluzone High-Dose) IM injection 0.5 mL, Once PRN    insulin lispro (HumALOG/ADMELOG) 100 units/mL subcutaneous injection 1-6 Units, TID AC **AND** Fingerstick Glucose (POCT), TID AC    labetalol (NORMODYNE) injection 10 mg, Q4H PRN    NIFEdipine (PROCARDIA XL) 24 hr tablet 60 mg, After Dinner    ondansetron (ZOFRAN) injection 4 mg, Q6H PRN    pantoprazole (PROTONIX) injection 40 mg, Q12H KEMAL    polyethylene glycol (MIRALAX) packet 17 g, Daily PRN     sodium hypochlorite (DAKIN'S HALF-STRENGTH) 0.25 percent topical solution 1 Application, Daily    Administrative Statements   Today, Patient Was Seen By: Nirav Bruce MD  I have spent a total time of 30 minutes in caring for this patient on the day of the visit/encounter including Diagnostic results, Prognosis, Risks and benefits of tx options, and Instructions for management.    **Please Note: This note may have been constructed using a voice recognition system.**

## 2024-10-19 NOTE — PLAN OF CARE
TX plan reviewed with Dr Reyes and she is agreeable to the following: Goal is to UF 2 L on a 2 K+ bath for a morning serum K+ of 4.7, 210 mins TX. Primary nurse aware to give IV antibiotic after TX. Pt tolerated TX well. Pt c/o left leg neuropathy, one time dose of dilaudid given.    Problem: METABOLIC, FLUID AND ELECTROLYTES - ADULT  Goal: Electrolytes maintained within normal limits  Description: INTERVENTIONS:  - Monitor labs and assess patient for signs and symptoms of electrolyte imbalances  - Administer electrolyte replacement as ordered  - Monitor response to electrolyte replacements, including repeat lab results as appropriate  - Instruct patient on fluid and nutrition as appropriate  Outcome: Progressing  Goal: Fluid balance maintained  Description: INTERVENTIONS:  - Monitor labs   - Monitor I/O and WT  - Instruct patient on fluid and nutrition as appropriate  - Assess for signs & symptoms of volume excess or deficit  Outcome: Progressing     Post-Dialysis RN Treatment Note    Blood Pressure:  Pre 139/62 mm/Hg  Post 156/65 mmHg   EDW  TBD kg    Weight:  Pre 97.5 kg   Post 95.5 kg   Mode of weight measurement: Standing Scale   Volume Removed  2000 ml net    Treatment duration 210 minutes    NS given  No    Treatment shortened? No   Medications given during Rx Epogen 3,000 units, dilaudid 0.2mg   Estimated Kt/V  None Reported   Access type: Permacath/TDC   Access Issues: No    Report called to primary nurse   Yes, Robinson Espinoza RN     Param Ferrell)

## 2024-10-19 NOTE — ASSESSMENT & PLAN NOTE
Lab Results   Component Value Date    EGFR 13 10/18/2024    EGFR 8 10/17/2024    EGFR 12 10/16/2024    CREATININE 4.35 (H) 10/18/2024    CREATININE 6.25 (H) 10/17/2024    CREATININE 4.62 (H) 10/16/2024   ESRD on HD TTS:  Dialysis unit/days:[facility]   Access: PermCath  Last dialysis Thursday   Plan for HD today   Renal Diet  Fluid restriction  1.8 L  Adjust medications to GFR<10  Avoid opioids   Stable for discharge from my end

## 2024-10-19 NOTE — ASSESSMENT & PLAN NOTE
Updated UA on 10/17; leukocytes  Patient reports dysuria      -day 2 of 3 cfx   -Follow-up accordingly

## 2024-10-19 NOTE — ASSESSMENT & PLAN NOTE
Lab Results   Component Value Date    EGFR 9 10/19/2024    EGFR 13 10/18/2024    EGFR 8 10/17/2024    CREATININE 5.79 (H) 10/19/2024    CREATININE 4.35 (H) 10/18/2024    CREATININE 6.25 (H) 10/17/2024     Patient gets dialysis Tuesday, Thursday, Saturday and has missed dialysis for about a week since being discharged from short-term rehab.  Patient was scheduled for dialysis at a different facility but family stated that they cannot transport him for his appointments  Nephrology following  Continued on TTS schedule  Follows up with [facility] dialysis center.  Case management confirmed scheduling for dialysis center.  Continue Renagel for hyperphosphatemia  Next dialysis session today

## 2024-10-19 NOTE — ASSESSMENT & PLAN NOTE
Volume: Hypervolemia    Blood pressure: Normotensive, /60  Fluid restriction 1.5 to 1.8 L  Continue with nifedipine 60 mg

## 2024-10-19 NOTE — PLAN OF CARE
Problem: Potential for Falls  Goal: Patient will remain free of falls  Description: INTERVENTIONS:  - Educate patient/family on patient safety including physical limitations  - Instruct patient to call for assistance with activity   - Consult OT/PT to assist with strengthening/mobility   - Keep Call bell within reach  - Keep bed low and locked with side rails adjusted as appropriate  - Keep care items and personal belongings within reach  - Initiate and maintain comfort rounds  - Make Fall Risk Sign visible to staff  - Offer Toileting every 2 Hours, in advance of need  - Initiate/Maintain bed alarm  - Obtain necessary fall risk management equipment: fall risk sign on door  - Apply yellow socks and bracelet for high fall risk patients  - Consider moving patient to room near nurses station  Outcome: Progressing     Problem: METABOLIC, FLUID AND ELECTROLYTES - ADULT  Goal: Electrolytes maintained within normal limits  Description: INTERVENTIONS:  - Monitor labs and assess patient for signs and symptoms of electrolyte imbalances  - Administer electrolyte replacement as ordered  - Monitor response to electrolyte replacements, including repeat lab results as appropriate  - Instruct patient on fluid and nutrition as appropriate  Outcome: Progressing  Goal: Fluid balance maintained  Description: INTERVENTIONS:  - Monitor labs   - Monitor I/O and WT  - Instruct patient on fluid and nutrition as appropriate  - Assess for signs & symptoms of volume excess or deficit  Outcome: Progressing     Problem: MUSCULOSKELETAL - ADULT  Goal: Maintain or return mobility to safest level of function  Description: INTERVENTIONS:  - Assess patient's ability to carry out ADLs; assess patient's baseline for ADL function and identify physical deficits which impact ability to perform ADLs (bathing, care of mouth/teeth, toileting, grooming, dressing, etc.)  - Assess/evaluate cause of self-care deficits   - Assess range of motion  - Assess  patient's mobility  - Assess patient's need for assistive devices and provide as appropriate  - Encourage maximum independence but intervene and supervise when necessary  - Involve family in performance of ADLs  - Assess for home care needs following discharge   - Consider OT consult to assist with ADL evaluation and planning for discharge  - Provide patient education as appropriate  Outcome: Progressing  Goal: Maintain proper alignment of affected body part  Description: INTERVENTIONS:  - Support, maintain and protect limb and body alignment  - Provide patient/ family with appropriate education  Outcome: Progressing     Problem: NEUROSENSORY - ADULT  Goal: Achieves stable or improved neurological status  Description: INTERVENTIONS  - Monitor and report changes in neurological status  - Monitor vital signs such as temperature, blood pressure, glucose, and any other labs ordered       Outcome: Progressing  Goal: Achieves maximal functionality and self care  Description: INTERVENTIONS  - Monitor swallowing and airway patency with patient fatigue and changes in neurological status  - Encourage and assist patient to increase activity and self care.   - Encourage visually impaired, hearing impaired and aphasic patients to use assistive/communication devices  Outcome: Progressing     Problem: Prexisting or High Potential for Compromised Skin Integrity  Goal: Skin integrity is maintained or improved  Description: INTERVENTIONS:  - Identify patients at risk for skin breakdown  - Assess and monitor skin integrity  - Assess and monitor nutrition and hydration status  - Monitor labs   - Assess for incontinence   - Turn and reposition patient  - Assist with mobility/ambulation  - Relieve pressure over bony prominences  - Avoid friction and shearing  - Provide appropriate hygiene as needed including keeping skin clean and dry  - Evaluate need for skin moisturizer/barrier cream  - Collaborate with interdisciplinary team   -  Patient/family teaching  - Consider wound care consult   Outcome: Progressing     Problem: PAIN - ADULT  Goal: Verbalizes/displays adequate comfort level or baseline comfort level  Description: Interventions:  - Encourage patient to monitor pain and request assistance  - Assess pain using appropriate pain scale  - Administer analgesics based on type and severity of pain and evaluate response  - Implement non-pharmacological measures as appropriate and evaluate response  - Consider cultural and social influences on pain and pain management  - Notify physician/advanced practitioner if interventions unsuccessful or patient reports new pain  Outcome: Progressing     Problem: INFECTION - ADULT  Goal: Absence or prevention of progression during hospitalization  Description: INTERVENTIONS:  - Assess and monitor for signs and symptoms of infection  - Monitor lab/diagnostic results  - Monitor all insertion sites, i.e. indwelling lines, tubes, and drains  - Administer medications as ordered  - Instruct and encourage patient and family to use good hand hygiene technique  - Identify and instruct in appropriate isolation precautions for identified infection/condition  Outcome: Progressing  Goal: Absence of fever/infection during neutropenic period  Description: INTERVENTIONS:  - Monitor WBC    Outcome: Progressing     Problem: SAFETY ADULT  Goal: Patient will remain free of falls  Description: INTERVENTIONS:  - Educate patient/family on patient safety including physical limitations  - Instruct patient to call for assistance with activity   - Consult OT/PT to assist with strengthening/mobility   - Keep Call bell within reach  - Keep bed low and locked with side rails adjusted as appropriate  - Keep care items and personal belongings within reach  - Initiate and maintain comfort rounds  - Make Fall Risk Sign visible to staff  - Offer Toileting every 2 Hours, in advance of need  - Initiate/Maintain bed alarm  - Obtain necessary  fall risk management equipment: fall risk sign on door  - Apply yellow socks and bracelet for high fall risk patients  - Consider moving patient to room near nurses station  Outcome: Progressing  Goal: Maintain or return to baseline ADL function  Description: INTERVENTIONS:  -  Assess patient's ability to carry out ADLs; assess patient's baseline for ADL function and identify physical deficits which impact ability to perform ADLs (bathing, care of mouth/teeth, toileting, grooming, dressing, etc.)  - Assess/evaluate cause of self-care deficits   - Assess range of motion  - Assess patient's mobility; develop plan if impaired  - Assess patient's need for assistive devices and provide as appropriate  - Encourage maximum independence but intervene and supervise when necessary  - Involve family in performance of ADLs  - Assess for home care needs following discharge   - Consider OT consult to assist with ADL evaluation and planning for discharge  - Provide patient education as appropriate  Outcome: Progressing  Goal: Maintains/Returns to pre admission functional level  Description: INTERVENTIONS:  - Perform AM-PAC 6 Click Basic Mobility/ Daily Activity assessment daily.  - Set and communicate daily mobility goal to care team and patient/family/caregiver.   - Collaborate with rehabilitation services on mobility goals if consulted  - Perform Range of Motion 2 times a day.  - Reposition patient every 2 hours.  - Dangle patient 2 times a day  - Stand patient 2 times a day  - Ambulate patient 3 times a day  - Out of bed to chair 3 times a day   - Out of bed for meals 3 times a day  - Out of bed for toileting  - Record patient progress and toleration of activity level   Outcome: Progressing     Problem: DISCHARGE PLANNING  Goal: Discharge to home or other facility with appropriate resources  Description: INTERVENTIONS:  - Identify barriers to discharge w/patient and caregiver  - Arrange for needed discharge resources and  transportation as appropriate  - Identify discharge learning needs (meds, wound care, etc.)  - Arrange for interpretive services to assist at discharge as needed  - Refer to Case Management Department for coordinating discharge planning if the patient needs post-hospital services based on physician/advanced practitioner order or complex needs related to functional status, cognitive ability, or social support system  Outcome: Progressing     Problem: Knowledge Deficit  Goal: Patient/family/caregiver demonstrates understanding of disease process, treatment plan, medications, and discharge instructions  Description: Complete learning assessment and assess knowledge base.  Interventions:  - Provide teaching at level of understanding  - Provide teaching via preferred learning methods  Outcome: Progressing     Problem: Nutrition/Hydration-ADULT  Goal: Nutrient/Hydration intake appropriate for improving, restoring or maintaining nutritional needs  Description: Monitor and assess patient's nutrition/hydration status for malnutrition. Collaborate with interdisciplinary team and initiate plan and interventions as ordered.  Monitor patient's weight and dietary intake as ordered or per policy. Utilize nutrition screening tool and intervene as necessary. Determine patient's food preferences and provide high-protein, high-caloric foods as appropriate.     INTERVENTIONS:  - Monitor oral intake, urinary output, labs, and treatment plans  - Assess nutrition and hydration status and recommend course of action  - Evaluate amount of meals eaten  - Assist patient with eating if necessary   - Allow adequate time for meals  - Recommend/ encourage appropriate diets, oral nutritional supplements, and vitamin/mineral supplements  - Order, calculate, and assess calorie counts as needed  - Recommend, monitor, and adjust tube feedings and TPN/PPN based on assessed needs  - Assess need for intravenous fluids  - Provide specific  nutrition/hydration education as appropriate  - Include patient/family/caregiver in decisions related to nutrition  Outcome: Progressing

## 2024-10-19 NOTE — PROGRESS NOTES
Patient examined spoke to the nurse patient is pleasant cooperative he reports that he will have dialysis this afternoon and he is planning to go home patient reminded not to miss his dialysis after the discharge he responded positively by saying that'' I know'' he is encouraged to comply with all his medical care after the discharge.  He lives alone but he has good support from the sister and brother-in-law.  Nurse reported no behavioral problem.  Patient is pleasant cooperative he is not confused not agitated.  I reviewed the medical progress note.  Patient's creatinine level is little higher than yesterday it was 4.35 and today it is 5.79.  Patient reports that'' my hemoglobin is better'' patient is fully aware of his ongoing physical problems and he understood the need for follow-up medical care after the discharge.  I will continue Lexapro the same patient is encouraged to continue his medication after the discharge from the family physician.  I will follow-up.

## 2024-10-19 NOTE — ASSESSMENT & PLAN NOTE
Lab Results   Component Value Date    HGBA1C 5.5 10/08/2024       Recent Labs     10/18/24  1108 10/18/24  1622 10/18/24  2132 10/19/24  0728   POCGLU 113 119 154* 102   [facility]  Diabetic diet

## 2024-10-19 NOTE — PROGRESS NOTES
Progress Note - Nephrology   Name: Naresh Carpio 64 y.o. male I MRN: [id number]  Unit/Bed#: 4 [place] 414-01 I Date of Admission: 10/8/2024   Date of Service: 10/19/2024 I Hospital Day: 10     Assessment & Plan  ESRD (end stage renal disease) ([facility])  Lab Results   Component Value Date    EGFR 13 10/18/2024    EGFR 8 10/17/2024    EGFR 12 10/16/2024    CREATININE 4.35 (H) 10/18/2024    CREATININE 6.25 (H) 10/17/2024    CREATININE 4.62 (H) 10/16/2024   ESRD on HD TTS:  Dialysis unit/days:[facility]   Access: PermCath  Last dialysis Thursday   Plan for HD today   Renal Diet  Fluid restriction  1.8 L  Adjust medications to GFR<10  Avoid opioids   Stable for discharge from my end        Type 2 diabetes mellitus, without long-term current use of insulin ([facility])  Lab Results   Component Value Date    HGBA1C 5.5 10/08/2024       Recent Labs     10/18/24  0718 10/18/24  1108 10/18/24  1622 10/18/24  2132   POCGLU 106 113 119 154*       Blood Sugar Average: Last 72 hrs:  (P) [id number]    HbA1c 5.5  Glucose well-controlled  Continue with insulin  Maintain healthy diet (vegetables, fruits, whole grains, nonfat or low fat)  Weight loss  Physical activity (5 to 10 minutes to start the increase to 30 min a day)    Status post fall  PT OT as per primary team  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, on chronic dialysis ([facility])  Lab Results   Component Value Date    EGFR 13 10/18/2024    EGFR 8 10/17/2024    EGFR 12 10/16/2024    CREATININE 4.35 (H) 10/18/2024    CREATININE 6.25 (H) 10/17/2024    CREATININE 4.62 (H) 10/16/2024   Serum calcium 8.1 mg/dL   phosphorus at goal 5.4  No indication of binders at this time  Hypertension  Volume: Hypervolemia    Blood pressure: Normotensive, /60  Fluid restriction 1.5 to 1.8 L  Continue with nifedipine 60 mg   Hyponatremia  Serum sodium 131 mEq/L  Secondary to fluid overload, decreased free water excretion  HD with UF today    I have  reviewed the nephrology recommendations including dialysis today, with primary team, and we are in agreement with renal plan including the information outlined above. Ok for discharge from Nephrology service perspective.    Subjective   Brief History of Admission - 65 yo man with PMH of ESRD on HD p/ after a fall. Nephrology is consulted for management of ESRD     Patient denies shortness of breath, no chest pain, feels tired    Objective :  Temp:  [98.2 °F (36.8 °C)-98.5 °F (36.9 °C)] 98.4 °F (36.9 °C)  HR:  [60-67] 62  BP: (147-152)/(59-62) 148/60  Resp:  [17-18] 17  SpO2:  [96 %-97 %] 96 %    Current Weight: Weight - Scale: 96.9 kg (213 lb 9.6 oz)  First Weight: Weight - Scale: 112 kg (245 lb 13 oz)  I/O         10/17 0701  10/18 0700 10/18 0701  10/19 0700    P.O. 120 340    I.V. (mL/kg) 500 (5.2)     Total Intake(mL/kg) 620 (6.4) 340 (3.5)    Urine (mL/kg/hr) 100 (0) 300 (0.1)    Other 2500     Total Output 2600 300    Net -1980 +40                Physical Exam  General:  no acute distress at this time  Skin:  No acute rash  Eyes:  No scleral icterus and noninjected  ENT:  mucous membranes moist  Neck:  no carotid bruits  Chest:  Clear to auscultation percussion, good respiratory effort, no use of accessory respiratory muscles  CVS:  Regular rate and rhythm without rub   Abdomen:  soft and nontender   Extremities: lower extremity edema  Neuro:  No gross focality  Psych:  Alert , cooperative   Dialysis access: PermCath    Medications:    Current Facility-Administered Medications:     acetaminophen (Ofirmev) injection 1,000 mg, 1,000 mg, Intravenous, Q6H PRN, Lore Revankar, DO, Last Rate: 400 mL/hr at 10/15/24 2122, 1,000 mg at 10/15/24 2122    allopurinol (ZYLOPRIM) tablet 100 mg, 100 mg, Oral, Daily, Lore Revankar, DO, 100 mg at 10/18/24 0823    aspirin chewable tablet 81 mg, 81 mg, Oral, Daily, Nirav Bruce MD, 81 mg at 10/18/24 0823    atorvastatin (LIPITOR) tablet 40 mg, 40 mg, Oral, Daily With  Dinner, Lore Revankar, DO, 40 mg at 10/18/24 1722    cefTRIAXone (ROCEPHIN) IVPB (premix in dextrose) 1,000 mg 50 mL, 1,000 mg, Intravenous, Q24H, Nirav Bruce MD, Last Rate: 100 mL/hr at 10/18/24 1213, 1,000 mg at 10/18/24 1213    docusate sodium (COLACE) capsule 100 mg, 100 mg, Oral, BID, Lore Revankar, DO, 100 mg at 10/18/24 1722    epoetin jessica (EPOGEN,PROCRIT) injection 3,000 Units, 3,000 Units, Intravenous, Once per day on Tuesday Thursday Saturday, Joselyn Reyes Bahamonde, MD, 3,000 Units at 10/17/24 1532    escitalopram (LEXAPRO) tablet 10 mg, 10 mg, Oral, Daily, Lore Amadorar, DO, 10 mg at 10/18/24 0823    gabapentin (NEURONTIN) capsule 300 mg, 300 mg, Oral, BID, Gennaro Zelaya, DO, 300 mg at 10/18/24 1722    heparin (porcine) subcutaneous injection 5,000 Units, 5,000 Units, Subcutaneous, Q8H KEMAL, Nirav Bruce MD, 5,000 Units at 10/18/24 2149    hydrALAZINE (APRESOLINE) injection 10 mg, 10 mg, Intravenous, Q6H PRN, Lore Revankar, DO, 10 mg at 10/10/24 1944    influenza vaccine, high-dose (Fluzone High-Dose) IM injection 0.5 mL, 0.5 mL, Intramuscular, Once PRN, Lore Revankar, DO    insulin lispro (HumALOG/ADMELOG) 100 units/mL subcutaneous injection 1-6 Units, 1-6 Units, Subcutaneous, TID AC **AND** Fingerstick Glucose (POCT), , , TID AC, Lore Revankar, DO    labetalol (NORMODYNE) injection 10 mg, 10 mg, Intravenous, Q4H PRN, Lore Revankar, DO, 10 mg at 10/10/24 1822    NIFEdipine (PROCARDIA XL) 24 hr tablet 60 mg, 60 mg, Oral, After Dinner, Joselyn Reyes Bahamonde, MD, 60 mg at 10/18/24 1722    ondansetron (ZOFRAN) injection 4 mg, 4 mg, Intravenous, Q6H PRN, GRANT Rich, 4 mg at 10/12/24 2128    pantoprazole (PROTONIX) injection 40 mg, 40 mg, Intravenous, Q12H KEMAL, Lore Silver DO, 40 mg at 10/18/24 2149    polyethylene glycol (MIRALAX) packet 17 g, 17 g, Oral, Daily PRN, Lore Silver DO, 17 g at 10/10/24 1841    sodium hypochlorite (DAKIN'S HALF-STRENGTH) 0.25  "percent topical solution 1 Application, 1 Application, Irrigation, Daily, Lore Silver, DO, 1 Application at 10/18/24 0828      Lab Results: I have reviewed the following results:  Results from last 7 days   Lab Units 10/18/24  0555 10/17/24  0752 10/16/24  2005 10/16/24  0355 10/15/24  0831 10/14/24  0614 10/13/24  0958   WBC Thousand/uL 7.82  --   --  6.82 6.01 6.19 7.26   HEMOGLOBIN g/dL 8.2* 7.7* 7.6* 7.8* 7.6* 8.1* 7.8*   HEMATOCRIT % 25.7* 24.8* 24.4* 24.4* 23.7* 25.2* 24.3*   PLATELETS Thousands/uL 285  --   --  210 213 211 215   POTASSIUM mmol/L 4.2 4.4  --  4.1 4.3 4.5 4.1   CHLORIDE mmol/L 96 99  --  98 99 95* 95*   CO2 mmol/L 27 23  --  27 27 26 26   BUN mg/dL 27* 40*  --  25 49* 38* 25   CREATININE mg/dL 4.35* 6.25*  --  4.62* 7.85* 6.44* 4.93*   CALCIUM mg/dL 8.6 8.0*  --  8.2* 7.7* 7.6* 7.7*   MAGNESIUM mg/dL 1.8* 1.8*  --   --  1.9  --   --    PHOSPHORUS mg/dL 3.3  --   --   --   --   --   --        Administrative Statements     Portions of the record may have been created with voice recognition software. Occasional wrong word or \"sound a like\" substitutions may have occurred due to the inherent limitations of voice recognition software. Read the chart carefully and recognize, using context, where substitutions have occurred.If you have any questions, please contact the dictating provider.  "

## 2024-10-19 NOTE — ASSESSMENT & PLAN NOTE
Lab Results   Component Value Date    HGBA1C 5.5 10/08/2024       Recent Labs     10/18/24  0718 10/18/24  1108 10/18/24  1622 10/18/24  2132   POCGLU 106 113 119 154*       Blood Sugar Average: Last 72 hrs:  (P) [id number]    HbA1c 5.5  Glucose well-controlled  Continue with insulin  Maintain healthy diet (vegetables, fruits, whole grains, nonfat or low fat)  Weight loss  Physical activity (5 to 10 minutes to start the increase to 30 min a day)

## 2024-10-19 NOTE — ASSESSMENT & PLAN NOTE
Serum sodium 131 mEq/L  Secondary to fluid overload, decreased free water excretion  HD with UF today

## 2024-10-20 PROBLEM — R11.0 NAUSEA: Status: ACTIVE | Noted: 2024-10-20

## 2024-10-20 LAB
ANION GAP SERPL CALCULATED.3IONS-SCNC: 9 MMOL/L (ref 4–13)
BASOPHILS # BLD AUTO: 0.04 THOUSANDS/ΜL (ref 0–0.1)
BASOPHILS NFR BLD AUTO: 1 % (ref 0–1)
BUN SERPL-MCNC: 26 MG/DL (ref 5–25)
CALCIUM SERPL-MCNC: 8.1 MG/DL (ref 8.4–10.2)
CHLORIDE SERPL-SCNC: 90 MMOL/L (ref 96–108)
CO2 SERPL-SCNC: 29 MMOL/L (ref 21–32)
CREAT SERPL-MCNC: 4.15 MG/DL (ref 0.6–1.3)
EOSINOPHIL # BLD AUTO: 0.53 THOUSAND/ΜL (ref 0–0.61)
EOSINOPHIL NFR BLD AUTO: 7 % (ref 0–6)
ERYTHROCYTE [DISTWIDTH] IN BLOOD BY AUTOMATED COUNT: 16.1 % (ref 11.6–15.1)
GFR SERPL CREATININE-BSD FRML MDRD: 14 ML/MIN/1.73SQ M
GLUCOSE SERPL-MCNC: 110 MG/DL (ref 65–140)
GLUCOSE SERPL-MCNC: 123 MG/DL (ref 65–140)
GLUCOSE SERPL-MCNC: 146 MG/DL (ref 65–140)
GLUCOSE SERPL-MCNC: 97 MG/DL (ref 65–140)
GLUCOSE SERPL-MCNC: 97 MG/DL (ref 65–140)
HCT VFR BLD AUTO: 26.1 % (ref 36.5–49.3)
HGB BLD-MCNC: 8.4 G/DL (ref 12–17)
IMM GRANULOCYTES # BLD AUTO: 0.03 THOUSAND/UL (ref 0–0.2)
IMM GRANULOCYTES NFR BLD AUTO: 0 % (ref 0–2)
LYMPHOCYTES # BLD AUTO: 1.28 THOUSANDS/ΜL (ref 0.6–4.47)
LYMPHOCYTES NFR BLD AUTO: 16 % (ref 14–44)
MAGNESIUM SERPL-MCNC: 1.7 MG/DL (ref 1.9–2.7)
MCH RBC QN AUTO: 29.5 PG (ref 26.8–34.3)
MCHC RBC AUTO-ENTMCNC: 32.2 G/DL (ref 31.4–37.4)
MCV RBC AUTO: 92 FL (ref 82–98)
MONOCYTES # BLD AUTO: 0.78 THOUSAND/ΜL (ref 0.17–1.22)
MONOCYTES NFR BLD AUTO: 10 % (ref 4–12)
NEUTROPHILS # BLD AUTO: 5.13 THOUSANDS/ΜL (ref 1.85–7.62)
NEUTS SEG NFR BLD AUTO: 66 % (ref 43–75)
NRBC BLD AUTO-RTO: 0 /100 WBCS
PLATELET # BLD AUTO: 274 THOUSANDS/UL (ref 149–390)
PMV BLD AUTO: 9.4 FL (ref 8.9–12.7)
POTASSIUM SERPL-SCNC: 4 MMOL/L (ref 3.5–5.3)
RBC # BLD AUTO: 2.85 MILLION/UL (ref 3.88–5.62)
SODIUM SERPL-SCNC: 128 MMOL/L (ref 135–147)
WBC # BLD AUTO: 7.79 THOUSAND/UL (ref 4.31–10.16)

## 2024-10-20 PROCEDURE — 99232 SBSQ HOSP IP/OBS MODERATE 35: CPT

## 2024-10-20 PROCEDURE — 85025 COMPLETE CBC W/AUTO DIFF WBC: CPT

## 2024-10-20 PROCEDURE — 83735 ASSAY OF MAGNESIUM: CPT

## 2024-10-20 PROCEDURE — 80048 BASIC METABOLIC PNL TOTAL CA: CPT

## 2024-10-20 PROCEDURE — 82948 REAGENT STRIP/BLOOD GLUCOSE: CPT

## 2024-10-20 RX ORDER — PANTOPRAZOLE SODIUM 40 MG/1
40 TABLET, DELAYED RELEASE ORAL
Status: DISCONTINUED | OUTPATIENT
Start: 2024-10-21 | End: 2024-10-22 | Stop reason: HOSPADM

## 2024-10-20 RX ORDER — MAGNESIUM SULFATE HEPTAHYDRATE 40 MG/ML
2 INJECTION, SOLUTION INTRAVENOUS ONCE
Status: COMPLETED | OUTPATIENT
Start: 2024-10-20 | End: 2024-10-20

## 2024-10-20 RX ADMIN — ONDANSETRON 4 MG: 2 INJECTION INTRAMUSCULAR; INTRAVENOUS at 05:36

## 2024-10-20 RX ADMIN — HYOSCYAMINE SULFATE 1 APPLICATION: 16 SOLUTION at 11:20

## 2024-10-20 RX ADMIN — CEFTRIAXONE 1000 MG: 1 INJECTION, SOLUTION INTRAVENOUS at 16:14

## 2024-10-20 RX ADMIN — GABAPENTIN 300 MG: 300 CAPSULE ORAL at 08:36

## 2024-10-20 RX ADMIN — NIFEDIPINE 60 MG: 30 TABLET, EXTENDED RELEASE ORAL at 17:14

## 2024-10-20 RX ADMIN — GABAPENTIN 300 MG: 300 CAPSULE ORAL at 17:14

## 2024-10-20 RX ADMIN — HEPARIN SODIUM 5000 UNITS: 5000 INJECTION, SOLUTION INTRAVENOUS; SUBCUTANEOUS at 05:42

## 2024-10-20 RX ADMIN — DOCUSATE SODIUM 100 MG: 100 CAPSULE, LIQUID FILLED ORAL at 08:36

## 2024-10-20 RX ADMIN — DOCUSATE SODIUM 100 MG: 100 CAPSULE, LIQUID FILLED ORAL at 17:14

## 2024-10-20 RX ADMIN — HEPARIN SODIUM 5000 UNITS: 5000 INJECTION, SOLUTION INTRAVENOUS; SUBCUTANEOUS at 21:48

## 2024-10-20 RX ADMIN — HEPARIN SODIUM 5000 UNITS: 5000 INJECTION, SOLUTION INTRAVENOUS; SUBCUTANEOUS at 13:53

## 2024-10-20 RX ADMIN — ALLOPURINOL 100 MG: 100 TABLET ORAL at 08:36

## 2024-10-20 RX ADMIN — ASPIRIN 81 MG CHEWABLE TABLET 81 MG: 81 TABLET CHEWABLE at 08:36

## 2024-10-20 RX ADMIN — ESCITALOPRAM OXALATE 10 MG: 10 TABLET ORAL at 08:36

## 2024-10-20 RX ADMIN — MAGNESIUM SULFATE HEPTAHYDRATE 2 G: 40 INJECTION, SOLUTION INTRAVENOUS at 08:36

## 2024-10-20 RX ADMIN — PANTOPRAZOLE SODIUM 40 MG: 40 INJECTION, POWDER, FOR SOLUTION INTRAVENOUS at 08:36

## 2024-10-20 RX ADMIN — ATORVASTATIN CALCIUM 40 MG: 40 TABLET, FILM COATED ORAL at 16:14

## 2024-10-20 NOTE — TREATMENT PLAN
Patient had dialysis yesterday, UF 2 L.  Next dialysis Tuesday if remains inpatient      Joselyn Reyes Bahamonde, MD  Nephrology Attending

## 2024-10-20 NOTE — ASSESSMENT & PLAN NOTE
Lab Results   Component Value Date    HGBA1C 5.5 10/08/2024       Recent Labs     10/19/24  1130 10/19/24  1611 10/19/24  1957 10/20/24  0706   POCGLU 112 105 156* 123   [facility]  Diabetic diet

## 2024-10-20 NOTE — ASSESSMENT & PLAN NOTE
Updated UA on 10/17; leukocytes  Patient reports dysuria      -day 3 of 3 cfx   -Follow-up accordingly

## 2024-10-20 NOTE — ASSESSMENT & PLAN NOTE
Patient reports nausea and fatigue  started overnight.     Neuro exam negative.   Denies chest pain / tightness / SOB   No peripheral edema noted      -replete electrolytes  -completed 3/3 abx for cystitis   -interval follow up

## 2024-10-20 NOTE — PROGRESS NOTES
Patient examined spoke to the nurse hospitalist notes reviewed and noted patient reports that'' I got sick with my stomach'' he felt nauseated.  Nurse reports no behavioral problem.  Patient is pleasant cooperative thanking me for coming in talking to him.  He wants to get better and go home.  I reviewed his lab work today's creatinine level is 4.15 much better than yesterday.  No behavioral problem he offers no other complaints.  No psychosis no confusion he is pleasant and cooperative.  No evidence of psychosis no hallucination.  Patient denies feeling suicidal.  Patient is stable with the behavior and depression.  Therapy is done with good response.  Once patient is medically stable he will be discharged home he has good support from the family his sister and brother-in-law.  Continue Lexapro the same as ordered at this time.

## 2024-10-20 NOTE — ASSESSMENT & PLAN NOTE
Lab Results   Component Value Date    EGFR 14 10/20/2024    EGFR 9 10/19/2024    EGFR 13 10/18/2024    CREATININE 4.15 (H) 10/20/2024    CREATININE 5.79 (H) 10/19/2024    CREATININE 4.35 (H) 10/18/2024     Patient gets dialysis Tuesday, Thursday, Saturday and has missed dialysis for about a week since being discharged from short-term rehab.  Patient was scheduled for dialysis at a different facility but family stated that they cannot transport him for his appointments  Nephrology following  Continued on TTS schedule  Follows up with [facility] dialysis center.  Case management confirmed scheduling for dialysis center.  Continue Renagel for hyperphosphatemia

## 2024-10-20 NOTE — PROGRESS NOTES
Progress Note - Hospitalist   Name: Naresh Carpio 64 y.o. male I MRN: [id number]  Unit/Bed#: [address] [place], [place] [address] I Date of Admission: 10/8/2024   Date of Service: 10/20/2024 I Hospital Day: 11    Assessment & Plan  ESRD (end stage renal disease) ([facility])  Lab Results   Component Value Date    EGFR 14 10/20/2024    EGFR 9 10/19/2024    EGFR 13 10/18/2024    CREATININE 4.15 (H) 10/20/2024    CREATININE 5.79 (H) 10/19/2024    CREATININE 4.35 (H) 10/18/2024     Patient gets dialysis Tuesday, Thursday, Saturday and has missed dialysis for about a week since being discharged from short-term rehab.  Patient was scheduled for dialysis at a different facility but family stated that they cannot transport him for his appointments  Nephrology following  Continued on TTS schedule  Follows up with [facility] dialysis center.  Case management confirmed scheduling for dialysis center.  Continue Renagel for hyperphosphatemia  Type 2 diabetes mellitus, without long-term current use of insulin ([facility])  Lab Results   Component Value Date    HGBA1C 5.5 10/08/2024       Recent Labs     10/19/24  1130 10/19/24  1611 10/19/24  1957 10/20/24  0706   POCGLU 112 105 156* 123   SSI  Diabetic diet  Status post fall  CT head, CT c-spine, and XR R knee negative for acute abnormality on admission  PT/OT saw patient again on 10/15 as patient was seen concerns regarding ability to climb 8 steps into the home.  PT indicated that patient would have difficulty navigating stairs due to deconditioning, recommending short-term rehab  Discussed with case management  Fall precautions  PVD (peripheral vascular disease) ([facility])  Continue statin  Hypertension  Continue nifedipine 60 mg p.o. 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, on chronic dialysis ([facility])  Lab Results   Component Value Date    EGFR 14 10/20/2024    EGFR 9 10/19/2024    EGFR 13 10/18/2024    CREATININE 4.15 (H) 10/20/2024    CREATININE 5.79 (H)  10/19/2024    CREATININE 4.35 (H) 10/18/2024   Patient underwent dialysis on 10/15, continue TTS schedule  Acute cystitis  Updated UA on 10/17; leukocytes  Patient reports dysuria      -day 3 of 3 cfx   -Follow-up accordingly  Hyponatremia  Serum NA; 131< 128         -Check a.m. labs  -nephrology following     Nausea  Patient reports nausea and fatigue  started overnight.     Neuro exam negative.   Denies chest pain / tightness / SOB   No peripheral edema noted      -replete electrolytes  -completed 3/3 abx for cystitis   -interval follow up       VTE Pharmacologic Prophylaxis: VTE Score: 2 Moderate Risk (Score 3-4) - Pharmacological DVT Prophylaxis Ordered: heparin.    Mobility:   Basic Mobility Inpatient Raw Score: 22  JH-HLM Goal: 7: Walk 25 feet or more  JH-HLM Achieved: 8: Walk 250 feet ot more  JH-HLM Goal achieved. Continue to encourage appropriate mobility.    Patient Centered Rounds: I performed bedside rounds with nursing staff today.   Discussions with Specialists or Other Care Team Provider: nephrology     Education and Discussions with Family / Patient:  patient .     Current Length of Stay: 11 day(s)  Current Patient Status: Inpatient   Certification Statement: The patient will continue to require additional inpatient hospital stay due to ESRD on dialysis , nausea , UTI   Discharge Plan: Anticipate discharge tomorrow to home.    Code Status: Level 1 - Full Code    Subjective   Patient was seen today at bedside . Reports feeling nauseated , no vomiting. Sympx started overnight . Associated with generalized fatigue . No chest pain / tightness / SOB . No diarrhea or constipation .         Objective :  Temp:  [97.4 °F (36.3 °C)-98.3 °F (36.8 °C)] 98.3 °F (36.8 °C)  HR:  [55-68] 66  BP: (129-166)/(48-70) 129/53  Resp:  [18-20] 18  SpO2:  [95 %-99 %] 99 %  O2 Device: None (Room air)    Body mass index is 28.13 kg/m².     Input and Output Summary (last 24 hours):     Intake/Output Summary (Last 24 hours) at  10/20/2024 1002  Last data filed at 10/20/2024 0001  Gross per 24 hour   Intake 800 ml   Output 2950 ml   Net -2150 ml       Physical Exam  Vitals and nursing note reviewed.   Constitutional:       General: He is not in acute distress.     Appearance: Normal appearance.   HENT:      Head: Normocephalic and atraumatic.      Mouth/Throat:      Mouth: Mucous membranes are moist.   Eyes:      Conjunctiva/sclera: Conjunctivae normal.      Pupils: Pupils are equal, round, and reactive to light.   Cardiovascular:      Rate and Rhythm: Normal rate and regular rhythm.      Pulses: Normal pulses.           Carotid pulses are 2+ on the right side and 2+ on the left side.       Radial pulses are 2+ on the right side and 2+ on the left side.        Dorsalis pedis pulses are 2+ on the right side and 2+ on the left side.      Heart sounds: Normal heart sounds, S1 normal and S2 normal. No murmur heard.  Pulmonary:      Effort: No tachypnea, bradypnea or accessory muscle usage.      Breath sounds: Normal breath sounds and air entry. No decreased breath sounds, wheezing, rhonchi or rales.   Abdominal:      General: Abdomen is flat. Bowel sounds are normal. There is no distension.      Palpations: Abdomen is soft.      Tenderness: There is no abdominal tenderness.   Musculoskeletal:      Right lower leg: No edema.      Left lower leg: No edema.        Legs:       Comments: Closed wounds noted/ no erythema / warmth / swelling/ drainage /discharge .    Skin:     Capillary Refill: Capillary refill takes less than 2 seconds.   Neurological:      General: No focal deficit present.      Mental Status: He is alert and oriented to person, place, and time. Mental status is at baseline.      GCS: GCS eye subscore is 4. GCS verbal subscore is 5. GCS motor subscore is 6.      Cranial Nerves: Cranial nerves 2-12 are intact.   Psychiatric:         Mood and Affect: Mood normal.         Behavior: Behavior normal.            Lines/Drains:  Lines/Drains/Airways       Active Status       Name Placement date Placement time Site Days    HD Permanent Double Catheter --  --  Internal jugular  --                            Lab Results: I have reviewed the following results:   Results from last 7 days   Lab Units 10/20/24  0550   WBC Thousand/uL 7.79   HEMOGLOBIN g/dL 8.4*   HEMATOCRIT % 26.1*   PLATELETS Thousands/uL 274   SEGS PCT % 66   LYMPHO PCT % 16   MONO PCT % 10   EOS PCT % 7*     Results from last 7 days   Lab Units 10/20/24  0550   SODIUM mmol/L 128*   POTASSIUM mmol/L 4.0   CHLORIDE mmol/L 90*   CO2 mmol/L 29   BUN mg/dL 26*   CREATININE mg/dL 4.15*   ANION GAP mmol/L 9   CALCIUM mg/dL 8.1*   GLUCOSE RANDOM mg/dL 110     Results from last 7 days   Lab Units 10/19/24  0616   INR  0.91     Results from last 7 days   Lab Units 10/20/24  0706 10/19/24  1957 10/19/24  1611 10/19/24  1130 10/19/24  0728 10/18/24  2132 10/18/24  1622 10/18/24  1108 10/18/24  0718 10/17/24  1953 10/17/24  1648 10/17/24  1106   POC GLUCOSE mg/dl 123 156* 105 112 102 154* 119 113 106 151* 131 113               Recent Cultures (last 7 days):         Imaging Results Review: No pertinent imaging studies reviewed.  Other Study Results Review: EKG was reviewed.     Last 24 Hours Medication List:     Current Facility-Administered Medications:     acetaminophen (Ofirmev) injection 1,000 mg, Q6H PRN, Last Rate: 1,000 mg (10/15/24 2122)    allopurinol (ZYLOPRIM) tablet 100 mg, Daily    aspirin chewable tablet 81 mg, Daily    atorvastatin (LIPITOR) tablet 40 mg, Daily With Dinner    cefTRIAXone (ROCEPHIN) IVPB (premix in dextrose) 1,000 mg 50 mL, Q24H, Last Rate: 1,000 mg (10/19/24 1759)    docusate sodium (COLACE) capsule 100 mg, BID    epoetin jessica (EPOGEN,PROCRIT) injection 3,000 Units, Once per day on Tuesday Thursday Saturday    escitalopram (LEXAPRO) tablet 10 mg, Daily    gabapentin (NEURONTIN) capsule 300 mg, BID    heparin (porcine) subcutaneous  injection 5,000 Units, Q8H KEMAL    hydrALAZINE (APRESOLINE) injection 10 mg, Q6H PRN    influenza vaccine, high-dose (Fluzone High-Dose) IM injection 0.5 mL, Once PRN    insulin lispro (HumALOG/ADMELOG) 100 units/mL subcutaneous injection 1-6 Units, TID AC **AND** Fingerstick Glucose (POCT), TID AC    labetalol (NORMODYNE) injection 10 mg, Q4H PRN    magnesium sulfate 2 g/50 mL IVPB (premix) 2 g, Once, Last Rate: 2 g (10/20/24 0836)    NIFEdipine (PROCARDIA XL) 24 hr tablet 60 mg, After Dinner    ondansetron (ZOFRAN) injection 4 mg, Q6H PRN    polyethylene glycol (MIRALAX) packet 17 g, Daily PRN    sodium hypochlorite (DAKIN'S HALF-STRENGTH) 0.25 percent topical solution 1 Application, Daily    Administrative Statements   Today, Patient Was Seen By: Nirav Bruce MD  I have spent a total time of 30 minutes in caring for this patient on the day of the visit/encounter including Diagnostic results, Prognosis, Risks and benefits of tx options, and Instructions for management.    **Please Note: This note may have been constructed using a voice recognition system.**

## 2024-10-20 NOTE — NURSING NOTE
Spoke to the pt again about changing the I/v site as it is overdue but pt refused,saying he wants to wait until the doctor comes this morning and will agree to restart at another site if he doesn't get discharged today.Risks explained,pt verbalized understanding.

## 2024-10-20 NOTE — ASSESSMENT & PLAN NOTE
Lab Results   Component Value Date    EGFR 14 10/20/2024    EGFR 9 10/19/2024    EGFR 13 10/18/2024    CREATININE 4.15 (H) 10/20/2024    CREATININE 5.79 (H) 10/19/2024    CREATININE 4.35 (H) 10/18/2024   Patient underwent dialysis on 10/15, continue TTS schedule

## 2024-10-20 NOTE — PLAN OF CARE
Problem: Potential for Falls  Goal: Patient will remain free of falls  Description: INTERVENTIONS:  - Educate patient/family on patient safety including physical limitations  - Instruct patient to call for assistance with activity   - Consult OT/PT to assist with strengthening/mobility   - Keep Call bell within reach  - Keep bed low and locked with side rails adjusted as appropriate  - Keep care items and personal belongings within reach  - Initiate and maintain comfort rounds  - Make Fall Risk Sign visible to staff  - Offer Toileting every 2 Hours, in advance of need  - Initiate/Maintain bed alarm  - Obtain necessary fall risk management equipment: fall risk sign on door  - Apply yellow socks and bracelet for high fall risk patients  - Consider moving patient to room near nurses station  Outcome: Progressing     Problem: METABOLIC, FLUID AND ELECTROLYTES - ADULT  Goal: Electrolytes maintained within normal limits  Description: INTERVENTIONS:  - Monitor labs and assess patient for signs and symptoms of electrolyte imbalances  - Administer electrolyte replacement as ordered  - Monitor response to electrolyte replacements, including repeat lab results as appropriate  - Instruct patient on fluid and nutrition as appropriate  Outcome: Progressing  Goal: Fluid balance maintained  Description: INTERVENTIONS:  - Monitor labs   - Monitor I/O and WT  - Instruct patient on fluid and nutrition as appropriate  - Assess for signs & symptoms of volume excess or deficit  Outcome: Progressing     Problem: Prexisting or High Potential for Compromised Skin Integrity  Goal: Skin integrity is maintained or improved  Description: INTERVENTIONS:  - Identify patients at risk for skin breakdown  - Assess and monitor skin integrity  - Assess and monitor nutrition and hydration status  - Monitor labs   - Assess for incontinence   - Turn and reposition patient  - Assist with mobility/ambulation  - Relieve pressure over bony prominences  -  Avoid friction and shearing  - Provide appropriate hygiene as needed including keeping skin clean and dry  - Evaluate need for skin moisturizer/barrier cream  - Collaborate with interdisciplinary team   - Patient/family teaching  - Consider wound care consult   Outcome: Progressing     Problem: PAIN - ADULT  Goal: Verbalizes/displays adequate comfort level or baseline comfort level  Description: Interventions:  - Encourage patient to monitor pain and request assistance  - Assess pain using appropriate pain scale  - Administer analgesics based on type and severity of pain and evaluate response  - Implement non-pharmacological measures as appropriate and evaluate response  - Consider cultural and social influences on pain and pain management  - Notify physician/advanced practitioner if interventions unsuccessful or patient reports new pain  Outcome: Progressing     Problem: INFECTION - ADULT  Goal: Absence or prevention of progression during hospitalization  Description: INTERVENTIONS:  - Assess and monitor for signs and symptoms of infection  - Monitor lab/diagnostic results  - Monitor all insertion sites, i.e. indwelling lines, tubes, and drains  - Administer medications as ordered  - Instruct and encourage patient and family to use good hand hygiene technique  - Identify and instruct in appropriate isolation precautions for identified infection/condition  Outcome: Progressing  Goal: Absence of fever/infection during neutropenic period  Description: INTERVENTIONS:  - Monitor WBC    Outcome: Progressing     Problem: SAFETY ADULT  Goal: Patient will remain free of falls  Description: INTERVENTIONS:  - Educate patient/family on patient safety including physical limitations  - Instruct patient to call for assistance with activity   - Consult OT/PT to assist with strengthening/mobility   - Keep Call bell within reach  - Keep bed low and locked with side rails adjusted as appropriate  - Keep care items and personal  belongings within reach  - Initiate and maintain comfort rounds  - Make Fall Risk Sign visible to staff  - Offer Toileting every 2 Hours, in advance of need  - Initiate/Maintain bed alarm  - Obtain necessary fall risk management equipment: fall risk sign on door  - Apply yellow socks and bracelet for high fall risk patients  - Consider moving patient to room near nurses station  Outcome: Progressing  Goal: Maintain or return to baseline ADL function  Description: INTERVENTIONS:  -  Assess patient's ability to carry out ADLs; assess patient's baseline for ADL function and identify physical deficits which impact ability to perform ADLs (bathing, care of mouth/teeth, toileting, grooming, dressing, etc.)  - Assess/evaluate cause of self-care deficits   - Assess range of motion  - Assess patient's mobility; develop plan if impaired  - Assess patient's need for assistive devices and provide as appropriate  - Encourage maximum independence but intervene and supervise when necessary  - Involve family in performance of ADLs  - Assess for home care needs following discharge   - Consider OT consult to assist with ADL evaluation and planning for discharge  - Provide patient education as appropriate  Outcome: Progressing  Goal: Maintains/Returns to pre admission functional level  Description: INTERVENTIONS:  - Perform AM-PAC 6 Click Basic Mobility/ Daily Activity assessment daily.  - Set and communicate daily mobility goal to care team and patient/family/caregiver.   - Collaborate with rehabilitation services on mobility goals if consulted  - Perform Range of Motion 2 times a day.  - Reposition patient every 2 hours.  - Dangle patient 2 times a day  - Stand patient 2 times a day  - Ambulate patient 3 times a day  - Out of bed to chair 3 times a day   - Out of bed for meals 3 times a day  - Out of bed for toileting  - Record patient progress and toleration of activity level   Outcome: Progressing     Problem: DISCHARGE  PLANNING  Goal: Discharge to home or other facility with appropriate resources  Description: INTERVENTIONS:  - Identify barriers to discharge w/patient and caregiver  - Arrange for needed discharge resources and transportation as appropriate  - Identify discharge learning needs (meds, wound care, etc.)  - Arrange for interpretive services to assist at discharge as needed  - Refer to Case Management Department for coordinating discharge planning if the patient needs post-hospital services based on physician/advanced practitioner order or complex needs related to functional status, cognitive ability, or social support system  Outcome: Progressing     Problem: Knowledge Deficit  Goal: Patient/family/caregiver demonstrates understanding of disease process, treatment plan, medications, and discharge instructions  Description: Complete learning assessment and assess knowledge base.  Interventions:  - Provide teaching at level of understanding  - Provide teaching via preferred learning methods  Outcome: Progressing     Problem: CARDIOVASCULAR - ADULT  Goal: Maintains optimal cardiac output and hemodynamic stability  Description: INTERVENTIONS:  - Monitor I/O, vital signs and rhythm  - Monitor for S/S and trends of decreased cardiac output  - Administer and titrate ordered vasoactive medications to optimize hemodynamic stability  - Assess quality of pulses, skin color and temperature  - Assess for signs of decreased coronary artery perfusion  - Instruct patient to report change in severity of symptoms  Outcome: Progressing  Goal: Absence of cardiac dysrhythmias or at baseline rhythm  Description: INTERVENTIONS:  - Continuous cardiac monitoring, vital signs, obtain 12 lead EKG if ordered  - Administer antiarrhythmic and heart rate control medications as ordered  - Monitor electrolytes and administer replacement therapy as ordered  Outcome: Progressing     Problem: RESPIRATORY - ADULT  Goal: Achieves optimal ventilation and  oxygenation  Description: INTERVENTIONS:  - Assess for changes in respiratory status  - Assess for changes in mentation and behavior  - Position to facilitate oxygenation and minimize respiratory effort  - Oxygen administered by appropriate delivery if ordered  - Encourage broncho-pulmonary hygiene including cough, deep breathe, Incentive Spirometry  - Assess the need for suctioning and aspirate as needed  - Assess and instruct to report SOB or any respiratory difficulty  - Respiratory Therapy support as indicated  Outcome: Progressing     Problem: HEMATOLOGIC - ADULT  Goal: Maintains hematologic stability  Description: INTERVENTIONS  - Assess for signs and symptoms of bleeding or hemorrhage  - Monitor labs  - Administer supportive blood products/factors as ordered and appropriate  Outcome: Progressing     Problem: MUSCULOSKELETAL - ADULT  Goal: Maintain or return mobility to safest level of function  Description: INTERVENTIONS:  - Assess patient's ability to carry out ADLs; assess patient's baseline for ADL function and identify physical deficits which impact ability to perform ADLs (bathing, care of mouth/teeth, toileting, grooming, dressing, etc.)  - Assess/evaluate cause of self-care deficits   - Assess range of motion  - Assess patient's mobility  - Assess patient's need for assistive devices and provide as appropriate  - Encourage maximum independence but intervene and supervise when necessary  - Involve family in performance of ADLs  - Assess for home care needs following discharge   - Consider OT consult to assist with ADL evaluation and planning for discharge  - Provide patient education as appropriate  Outcome: Progressing  Goal: Maintain proper alignment of affected body part  Description: INTERVENTIONS:  - Support, maintain and protect limb and body alignment  - Provide patient/ family with appropriate education  Outcome: Progressing     Problem: NEUROSENSORY - ADULT  Goal: Achieves stable or improved  neurological status  Description: INTERVENTIONS  - Monitor and report changes in neurological status  - Monitor vital signs such as temperature, blood pressure, glucose, and any other labs ordered       Outcome: Progressing  Goal: Achieves maximal functionality and self care  Description: INTERVENTIONS  - Monitor swallowing and airway patency with patient fatigue and changes in neurological status  - Encourage and assist patient to increase activity and self care.   - Encourage visually impaired, hearing impaired and aphasic patients to use assistive/communication devices  Outcome: Progressing     Problem: Communication Impairment  Goal: Ability to express needs and understand communication  Description: Assess patient's communication skills and ability to understand information.  Patient will demonstrate use of effective communication techniques, alternative methods of communication and understanding even if not able to speak.     - Encourage communication and provide alternate methods of communication as needed.  - Collaborate with case management/ for discharge needs.  - Include patient/family/caregiver in decisions related to communication.  Outcome: Progressing     Problem: Potential for Aspiration  Goal: Non-ventilated patient's risk of aspiration is minimized  Description: Assess and monitor vital signs, respiratory status, and labs (WBC).  Monitor for signs of aspiration (tachypnea, cough, rales, wheezing, cyanosis, fever).    - Assess and monitor patient's ability to swallow.  - Place patient up in chair to eat if possible.  - HOB up at 90 degrees to eat if unable to get patient up into chair.  - Supervise patient during oral intake.   - Instruct patient/ family to take small bites.  - Instruct patient/ family to take small single sips when taking liquids.  - Follow patient-specific strategies generated by speech pathologist.  Outcome: Progressing     Problem: Neurological Deficit  Goal:  Neurological status is stable or improving  Description: Interventions:  - Monitor and assess patient's level of consciousness, motor function, sensory function, and level of assistance needed for ADLs.   - Monitor and report changes from baseline. Collaborate with interdisciplinary team to initiate plan and implement interventions as ordered.   - Provide and maintain a safe environment.  - Consider seizure precautions.  - Consider fall precautions.  - Consider aspiration precautions.  - Consider bleeding precautions.  Outcome: Progressing     Problem: Activity Intolerance/Impaired Mobility  Goal: Mobility/activity is maintained at optimum level for patient  Description: Interventions:  - Assess and monitor patient  barriers to mobility and need for assistive/adaptive devices.  - Assess patient's emotional response to limitations.  - Collaborate with interdisciplinary team and initiate plans and interventions as ordered.  - Encourage independent activity per ability.  - Maintain proper body alignment.  - Perform active rom as tolerated/ordered.  - Plan activities to conserve energy.  - Turn patient as appropriate  Outcome: Progressing     Problem: Nutrition  Goal: Nutrition/Hydration status is improving  Description: Monitor and assess patient's nutrition/hydration status for malnutrition (ex- brittle hair, bruises, dry skin, pale skin and conjunctiva, muscle wasting, smooth red tongue, and disorientation). Collaborate with interdisciplinary team and initiate plan and interventions as ordered.  Monitor patient's weight and dietary intake as ordered or per policy. Utilize nutrition screening tool and intervene per policy. Determine patient's food preferences and provide high-protein, high-caloric foods as appropriate.     - Assist patient with eating.  - Allow adequate time for meals.  - Encourage patient to take dietary supplement as ordered.  - Collaborate with clinical nutritionist.  - Include  patient/family/caregiver in decisions related to nutrition.  Outcome: Progressing     Problem: Nutrition/Hydration-ADULT  Goal: Nutrient/Hydration intake appropriate for improving, restoring or maintaining nutritional needs  Description: Monitor and assess patient's nutrition/hydration status for malnutrition. Collaborate with interdisciplinary team and initiate plan and interventions as ordered.  Monitor patient's weight and dietary intake as ordered or per policy. Utilize nutrition screening tool and intervene as necessary. Determine patient's food preferences and provide high-protein, high-caloric foods as appropriate.     INTERVENTIONS:  - Monitor oral intake, urinary output, labs, and treatment plans  - Assess nutrition and hydration status and recommend course of action  - Evaluate amount of meals eaten  - Assist patient with eating if necessary   - Allow adequate time for meals  - Recommend/ encourage appropriate diets, oral nutritional supplements, and vitamin/mineral supplements  - Order, calculate, and assess calorie counts as needed  - Recommend, monitor, and adjust tube feedings and TPN/PPN based on assessed needs  - Assess need for intravenous fluids  - Provide specific nutrition/hydration education as appropriate  - Include patient/family/caregiver in decisions related to nutrition  Outcome: Progressing

## 2024-10-21 PROBLEM — D63.1 ANEMIA DUE TO CHRONIC KIDNEY DISEASE, ON CHRONIC DIALYSIS (HCC): Status: ACTIVE | Noted: 2022-01-21

## 2024-10-21 PROBLEM — Z99.2 ANEMIA DUE TO CHRONIC KIDNEY DISEASE, ON CHRONIC DIALYSIS (HCC): Status: ACTIVE | Noted: 2022-01-21

## 2024-10-21 PROBLEM — N30.00 ACUTE CYSTITIS: Status: RESOLVED | Noted: 2024-10-18 | Resolved: 2024-10-21

## 2024-10-21 LAB
ANION GAP SERPL CALCULATED.3IONS-SCNC: 11 MMOL/L (ref 4–13)
ANION GAP SERPL CALCULATED.3IONS-SCNC: 12 MMOL/L (ref 4–13)
BASOPHILS # BLD AUTO: 0.04 THOUSANDS/ΜL (ref 0–0.1)
BASOPHILS NFR BLD AUTO: 1 % (ref 0–1)
BUN SERPL-MCNC: 40 MG/DL (ref 5–25)
BUN SERPL-MCNC: 44 MG/DL (ref 5–25)
CALCIUM SERPL-MCNC: 8.3 MG/DL (ref 8.4–10.2)
CALCIUM SERPL-MCNC: 8.7 MG/DL (ref 8.4–10.2)
CHLORIDE SERPL-SCNC: 89 MMOL/L (ref 96–108)
CHLORIDE SERPL-SCNC: 89 MMOL/L (ref 96–108)
CO2 SERPL-SCNC: 26 MMOL/L (ref 21–32)
CO2 SERPL-SCNC: 26 MMOL/L (ref 21–32)
CREAT SERPL-MCNC: 5.71 MG/DL (ref 0.6–1.3)
CREAT SERPL-MCNC: 6.34 MG/DL (ref 0.6–1.3)
EOSINOPHIL # BLD AUTO: 0.49 THOUSAND/ΜL (ref 0–0.61)
EOSINOPHIL NFR BLD AUTO: 7 % (ref 0–6)
ERYTHROCYTE [DISTWIDTH] IN BLOOD BY AUTOMATED COUNT: 16.5 % (ref 11.6–15.1)
GFR SERPL CREATININE-BSD FRML MDRD: 8 ML/MIN/1.73SQ M
GFR SERPL CREATININE-BSD FRML MDRD: 9 ML/MIN/1.73SQ M
GLUCOSE SERPL-MCNC: 100 MG/DL (ref 65–140)
GLUCOSE SERPL-MCNC: 101 MG/DL (ref 65–140)
GLUCOSE SERPL-MCNC: 110 MG/DL (ref 65–140)
GLUCOSE SERPL-MCNC: 115 MG/DL (ref 65–140)
GLUCOSE SERPL-MCNC: 128 MG/DL (ref 65–140)
GLUCOSE SERPL-MCNC: 93 MG/DL (ref 65–140)
HCT VFR BLD AUTO: 26.9 % (ref 36.5–49.3)
HGB BLD-MCNC: 8.7 G/DL (ref 12–17)
IMM GRANULOCYTES # BLD AUTO: 0.05 THOUSAND/UL (ref 0–0.2)
IMM GRANULOCYTES NFR BLD AUTO: 1 % (ref 0–2)
LYMPHOCYTES # BLD AUTO: 1.53 THOUSANDS/ΜL (ref 0.6–4.47)
LYMPHOCYTES NFR BLD AUTO: 21 % (ref 14–44)
MAGNESIUM SERPL-MCNC: 2.3 MG/DL (ref 1.9–2.7)
MCH RBC QN AUTO: 29.6 PG (ref 26.8–34.3)
MCHC RBC AUTO-ENTMCNC: 32.3 G/DL (ref 31.4–37.4)
MCV RBC AUTO: 92 FL (ref 82–98)
MONOCYTES # BLD AUTO: 0.82 THOUSAND/ΜL (ref 0.17–1.22)
MONOCYTES NFR BLD AUTO: 12 % (ref 4–12)
NEUTROPHILS # BLD AUTO: 4.23 THOUSANDS/ΜL (ref 1.85–7.62)
NEUTS SEG NFR BLD AUTO: 58 % (ref 43–75)
NRBC BLD AUTO-RTO: 0 /100 WBCS
PHOSPHATE SERPL-MCNC: 6 MG/DL (ref 2.3–4.1)
PLATELET # BLD AUTO: 290 THOUSANDS/UL (ref 149–390)
PMV BLD AUTO: 9.7 FL (ref 8.9–12.7)
POTASSIUM SERPL-SCNC: 4.4 MMOL/L (ref 3.5–5.3)
POTASSIUM SERPL-SCNC: 4.5 MMOL/L (ref 3.5–5.3)
RBC # BLD AUTO: 2.94 MILLION/UL (ref 3.88–5.62)
SODIUM SERPL-SCNC: 126 MMOL/L (ref 135–147)
SODIUM SERPL-SCNC: 127 MMOL/L (ref 135–147)
WBC # BLD AUTO: 7.16 THOUSAND/UL (ref 4.31–10.16)

## 2024-10-21 PROCEDURE — 82948 REAGENT STRIP/BLOOD GLUCOSE: CPT

## 2024-10-21 PROCEDURE — 99232 SBSQ HOSP IP/OBS MODERATE 35: CPT | Performed by: INTERNAL MEDICINE

## 2024-10-21 PROCEDURE — 84100 ASSAY OF PHOSPHORUS: CPT

## 2024-10-21 PROCEDURE — 80048 BASIC METABOLIC PNL TOTAL CA: CPT | Performed by: INTERNAL MEDICINE

## 2024-10-21 PROCEDURE — 83735 ASSAY OF MAGNESIUM: CPT

## 2024-10-21 PROCEDURE — 99232 SBSQ HOSP IP/OBS MODERATE 35: CPT

## 2024-10-21 PROCEDURE — 85025 COMPLETE CBC W/AUTO DIFF WBC: CPT

## 2024-10-21 PROCEDURE — 80048 BASIC METABOLIC PNL TOTAL CA: CPT

## 2024-10-21 RX ORDER — OXYCODONE HYDROCHLORIDE 5 MG/1
5 TABLET ORAL ONCE
Status: COMPLETED | OUTPATIENT
Start: 2024-10-21 | End: 2024-10-21

## 2024-10-21 RX ADMIN — NIFEDIPINE 60 MG: 30 TABLET, EXTENDED RELEASE ORAL at 18:12

## 2024-10-21 RX ADMIN — HEPARIN SODIUM 5000 UNITS: 5000 INJECTION, SOLUTION INTRAVENOUS; SUBCUTANEOUS at 05:03

## 2024-10-21 RX ADMIN — ATORVASTATIN CALCIUM 40 MG: 40 TABLET, FILM COATED ORAL at 15:33

## 2024-10-21 RX ADMIN — ESCITALOPRAM OXALATE 10 MG: 10 TABLET ORAL at 08:54

## 2024-10-21 RX ADMIN — DOCUSATE SODIUM 100 MG: 100 CAPSULE, LIQUID FILLED ORAL at 08:54

## 2024-10-21 RX ADMIN — ALLOPURINOL 100 MG: 100 TABLET ORAL at 08:54

## 2024-10-21 RX ADMIN — HYOSCYAMINE SULFATE 1 APPLICATION: 16 SOLUTION at 10:51

## 2024-10-21 RX ADMIN — GABAPENTIN 300 MG: 300 CAPSULE ORAL at 08:54

## 2024-10-21 RX ADMIN — PANTOPRAZOLE SODIUM 40 MG: 40 TABLET, DELAYED RELEASE ORAL at 05:03

## 2024-10-21 RX ADMIN — ASPIRIN 81 MG CHEWABLE TABLET 81 MG: 81 TABLET CHEWABLE at 08:54

## 2024-10-21 RX ADMIN — OXYCODONE HYDROCHLORIDE 5 MG: 5 TABLET ORAL at 23:19

## 2024-10-21 RX ADMIN — HEPARIN SODIUM 5000 UNITS: 5000 INJECTION, SOLUTION INTRAVENOUS; SUBCUTANEOUS at 15:33

## 2024-10-21 RX ADMIN — GABAPENTIN 300 MG: 300 CAPSULE ORAL at 18:12

## 2024-10-21 RX ADMIN — HEPARIN SODIUM 5000 UNITS: 5000 INJECTION, SOLUTION INTRAVENOUS; SUBCUTANEOUS at 21:59

## 2024-10-21 RX ADMIN — DOCUSATE SODIUM 100 MG: 100 CAPSULE, LIQUID FILLED ORAL at 18:12

## 2024-10-21 NOTE — PROGRESS NOTES
Progress Note - Hospitalist   Name: Naresh Carpio 64 y.o. male I MRN: [id number]  Unit/Bed#: [address] [place], [place] [id number] I Date of Admission: 10/8/2024   Date of Service: 10/21/2024 I Hospital Day: 12    Assessment & Plan  Anemia due to chronic kidney disease, on chronic dialysis ([facility])  Lab Results   Component Value Date    EGFR 9 10/21/2024    EGFR 14 10/20/2024    EGFR 9 10/19/2024    CREATININE 5.71 (H) 10/21/2024    CREATININE 4.15 (H) 10/20/2024    CREATININE 5.79 (H) 10/19/2024     Patient gets dialysis Tuesday, Thursday, Saturday and has missed dialysis for about a week since being discharged from short-term rehab.  Patient was scheduled for dialysis at a different facility but family stated that they cannot transport him for his appointments  Nephrology following  Continued on TTS schedule  Follows up with [facility] dialysis center.  Case management confirmed scheduling for dialysis center.  Continue Renagel for hyperphosphatemia  Type 2 diabetes mellitus, without long-term current use of insulin ([facility])  Lab Results   Component Value Date    HGBA1C 5.5 10/08/2024       Recent Labs     10/20/24  1120 10/20/24  1559 10/20/24  2041 10/21/24  0707   POCGLU 97 146* 97 100   SSI  Diabetic diet  Status post fall  CT head, CT c-spine, and XR R knee negative for acute abnormality on admission  PT/OT saw patient again on 10/15 as patient was seen concerns regarding ability to climb 8 steps into the home.  PT indicated that patient would have difficulty navigating stairs due to deconditioning, recommending short-term rehab  Discussed with case management  Fall precautions  PVD (peripheral vascular disease) ([facility])  Continue statin  Hypertension  Continue nifedipine 60 mg p.o. 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, on chronic dialysis ([facility])  Lab Results   Component Value Date    EGFR 9 10/21/2024    EGFR 14 10/20/2024    EGFR 9 10/19/2024    CREATININE 5.71 (H) 10/21/2024     CREATININE 4.15 (H) 10/20/2024    CREATININE 5.79 (H) 10/19/2024   Patient underwent dialysis on 10/15, continue TTS schedule  Acute cystitis (Resolved: 10/21/2024)  Updated UA on 10/17; leukocytes  Patient reports dysuria      -Completed 3 days of ceftriaxone  -Follow-up accordingly  Hyponatremia  Serum NA; 131< 128<126    Case discussed with nephrology team recommending monitoring BMP recheck at noon.    Nausea  Patient reports nausea and fatigue  started overnight.     Neuro exam negative.   Denies chest pain / tightness / SOB   No peripheral edema noted      -replete electrolytes  -completed 3/3 abx for cystitis   -Management per hyponatremia/nephrology team following  -ESRD on dialysis; nephrology team following  -Specialist Recommending monitoring the patient for next 24 hours with plan for discharge on 10/22 if stable      VTE Pharmacologic Prophylaxis: VTE Score: 2 Moderate Risk (Score 3-4) - Pharmacological DVT Prophylaxis Ordered: heparin.    Mobility:   Basic Mobility Inpatient Raw Score: 22  JH-HLM Goal: 7: Walk 25 feet or more  JH-HLM Achieved: 7: Walk 25 feet or more  JH-HLM Goal achieved. Continue to encourage appropriate mobility.    Patient Centered Rounds: I performed bedside rounds with nursing staff today.   Discussions with Specialists or Other Care Team Provider: Nephrology    Education and Discussions with Family / Patient:  Patient.     Current Length of Stay: 12 day(s)  Current Patient Status: Inpatient   Certification Statement: The patient will continue to require additional inpatient hospital stay due to end-stage renal disease on dialysis, hyponatremia, nausea  Discharge Plan: Anticipate discharge tomorrow to home.    Code Status: Level 1 - Full Code    Subjective   Patient was seen today at bedside.  Reports intermittent nausea since yesterday.  Slightly improved.  No vomiting.  No diarrhea or constipation.  Still producing urine at baseline with no active symptoms.  No chest pain or  tightness or shortness of breath.    Objective :  Temp:  [98 °F (36.7 °C)-98.8 °F (37.1 °C)] 98 °F (36.7 °C)  HR:  [58-68] 63  BP: (114-147)/(52-60) 147/60  Resp:  [16-19] 18  SpO2:  [93 %-99 %] 97 %  O2 Device: None (Room air)    Body mass index is 28.23 kg/m².     Input and Output Summary (last 24 hours):     Intake/Output Summary (Last 24 hours) at 10/21/2024 0957  Last data filed at 10/21/2024 0853  Gross per 24 hour   Intake 240 ml   Output 300 ml   Net -60 ml       Physical Exam  Vitals and nursing note reviewed.   Constitutional:       General: He is not in acute distress.     Appearance: Normal appearance.   HENT:      Head: Normocephalic and atraumatic.      Mouth/Throat:      Mouth: Mucous membranes are moist.   Eyes:      Conjunctiva/sclera: Conjunctivae normal.      Pupils: Pupils are equal, round, and reactive to light.   Cardiovascular:      Rate and Rhythm: Normal rate and regular rhythm.      Pulses: Normal pulses.           Carotid pulses are 2+ on the right side and 2+ on the left side.       Radial pulses are 2+ on the right side and 2+ on the left side.        Dorsalis pedis pulses are 2+ on the right side and 2+ on the left side.      Heart sounds: Normal heart sounds, S1 normal and S2 normal. No murmur heard.  Pulmonary:      Effort: No tachypnea, bradypnea or accessory muscle usage.      Breath sounds: Normal breath sounds and air entry. No decreased breath sounds, wheezing, rhonchi or rales.   Abdominal:      General: Abdomen is flat. Bowel sounds are normal. There is no distension.      Palpations: Abdomen is soft.      Tenderness: There is no abdominal tenderness.   Musculoskeletal:      Right lower leg: No edema.      Left lower leg: No edema.   Skin:     Capillary Refill: Capillary refill takes less than 2 seconds.   Neurological:      Mental Status: He is alert and oriented to person, place, and time. Mental status is at baseline.   Psychiatric:         Mood and Affect: Mood normal.          Behavior: Behavior normal.           Lines/Drains:  Lines/Drains/Airways       Active Status       Name Placement date Placement time Site Days    HD Permanent Double Catheter --  --  Internal jugular  --                            Lab Results: I have reviewed the following results:   Results from last 7 days   Lab Units 10/21/24  0431   WBC Thousand/uL 7.16   HEMOGLOBIN g/dL 8.7*   HEMATOCRIT % 26.9*   PLATELETS Thousands/uL 290   SEGS PCT % 58   LYMPHO PCT % 21   MONO PCT % 12   EOS PCT % 7*     Results from last 7 days   Lab Units 10/21/24  0431   SODIUM mmol/L 126*   POTASSIUM mmol/L 4.4   CHLORIDE mmol/L 89*   CO2 mmol/L 26   BUN mg/dL 40*   CREATININE mg/dL 5.71*   ANION GAP mmol/L 11   CALCIUM mg/dL 8.3*   GLUCOSE RANDOM mg/dL 128     Results from last 7 days   Lab Units 10/19/24  0616   INR  0.91     Results from last 7 days   Lab Units 10/21/24  0707 10/20/24  2041 10/20/24  1559 10/20/24  1120 10/20/24  0706 10/19/24  1957 10/19/24  1611 10/19/24  1130 10/19/24  0728 10/18/24  2132 10/18/24  1622 10/18/24  1108   POC GLUCOSE mg/dl 100 97 146* 97 123 156* 105 112 102 154* 119 113               Recent Cultures (last 7 days):         Imaging Results Review: No pertinent imaging studies reviewed.  Other Study Results Review: EKG was reviewed.     Last 24 Hours Medication List:     Current Facility-Administered Medications:     acetaminophen (Ofirmev) injection 1,000 mg, Q6H PRN, Last Rate: 1,000 mg (10/15/24 2122)    allopurinol (ZYLOPRIM) tablet 100 mg, Daily    aspirin chewable tablet 81 mg, Daily    atorvastatin (LIPITOR) tablet 40 mg, Daily With Dinner    docusate sodium (COLACE) capsule 100 mg, BID    epoetin jessica (EPOGEN,PROCRIT) injection 3,000 Units, Once per day on Tuesday Thursday Saturday    escitalopram (LEXAPRO) tablet 10 mg, Daily    gabapentin (NEURONTIN) capsule 300 mg, BID    heparin (porcine) subcutaneous injection 5,000 Units, Q8H KEMAL    hydrALAZINE (APRESOLINE) injection 10 mg, Q6H  PRN    influenza vaccine, high-dose (Fluzone High-Dose) IM injection 0.5 mL, Once PRN    insulin lispro (HumALOG/ADMELOG) 100 units/mL subcutaneous injection 1-6 Units, TID AC **AND** Fingerstick Glucose (POCT), TID AC    labetalol (NORMODYNE) injection 10 mg, Q4H PRN    NIFEdipine (PROCARDIA XL) 24 hr tablet 60 mg, After Dinner    ondansetron (ZOFRAN) injection 4 mg, Q6H PRN    pantoprazole (PROTONIX) EC tablet 40 mg, Early Morning    polyethylene glycol (MIRALAX) packet 17 g, Daily PRN    sodium hypochlorite (DAKIN'S HALF-STRENGTH) 0.25 percent topical solution 1 Application, Daily    Administrative Statements   Today, Patient Was Seen By: Nirav Bruce MD  I have spent a total time of 30 minutes in caring for this patient on the day of the visit/encounter including Diagnostic results, Prognosis, Risks and benefits of tx options, and Instructions for management.    **Please Note: This note may have been constructed using a voice recognition system.**

## 2024-10-21 NOTE — ASSESSMENT & PLAN NOTE
[facility]  - Patient has significant issues with compliance for dialysis  - Access-PermCath with right IJ tunneled dialysis catheter  - Patient missed approximately 1 week of dialysis since being discharged from short-term rehab  - 10/21-no urgent indication for dialysis.  Sodium borderline low and we will repeat.  Reinforced free water restriction    Plan  - Dialysis next on Tuesday  - BMP this afternoon  - Reinforced free water restriction  - Will monitor inpatient 1 more day  - Social work involvement for dialysis transportation  - Trend phosphorus for now  - Restart sevelamer once GI symptoms improve

## 2024-10-21 NOTE — ASSESSMENT & PLAN NOTE
Serum NA; 131< 128<126    Case discussed with nephrology team recommending monitoring BMP recheck at noon.

## 2024-10-21 NOTE — ASSESSMENT & PLAN NOTE
Lab Results   Component Value Date    EGFR 9 10/21/2024    EGFR 14 10/20/2024    EGFR 9 10/19/2024    CREATININE 5.71 (H) 10/21/2024    CREATININE 4.15 (H) 10/20/2024    CREATININE 5.79 (H) 10/19/2024   Patient underwent dialysis on 10/15, continue TTS schedule

## 2024-10-21 NOTE — ASSESSMENT & PLAN NOTE
Updated UA on 10/17; leukocytes  Patient reports dysuria      -Completed 3 days of ceftriaxone  -Follow-up accordingly

## 2024-10-21 NOTE — ASSESSMENT & PLAN NOTE
Lab Results   Component Value Date    EGFR 9 10/21/2024    EGFR 14 10/20/2024    EGFR 9 10/19/2024    CREATININE 5.71 (H) 10/21/2024    CREATININE 4.15 (H) 10/20/2024    CREATININE 5.79 (H) 10/19/2024     Patient gets dialysis Tuesday, Thursday, Saturday and has missed dialysis for about a week since being discharged from short-term rehab.  Patient was scheduled for dialysis at a different facility but family stated that they cannot transport him for his appointments  Nephrology following  Continued on TTS schedule  Follows up with [facility] dialysis center.  Case management confirmed scheduling for dialysis center.  Continue Renagel for hyperphosphatemia

## 2024-10-21 NOTE — PROGRESS NOTES
Progress Note - Nephrology   Name: Naresh Carpio 64 y.o. male I MRN: [id number]  Unit/Bed#: 4 [facility] 414-01 I Date of Admission: 10/8/2024   Date of Service: 10/21/2024 I Hospital Day: 12    Assessment & Plan  ESRD (end stage renal disease) (Formerly [facility])  Outpatient unit [facility]  - Patient has significant issues with compliance for dialysis  - Access-PermCath with right IJ tunneled dialysis catheter  - Patient missed approximately 1 week of dialysis since being discharged from short-term rehab  - 10/21-no urgent indication for dialysis.  Sodium borderline low and we will repeat.  Reinforced free water restriction    Plan  - Dialysis next on Tuesday  - BMP this afternoon  - Reinforced free water restriction  - Will monitor inpatient 1 more day  - Social work involvement for dialysis transportation  - Trend phosphorus for now  - Restart sevelamer once GI symptoms improve    NOTE WAS ADDENDED ON 10/25. PT WAS SEEN ON 10/21. TO REFLECT ESRD ICD CODE  Status post fall  - per primary team  - Initial acute trauma workup without acute abnormality  - Patient with cystitis treatment by primary team  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, on chronic dialysis ([facility])  - trend phos for now.  Is on sevelamer as outpatient.  Currently held due to GI discomfort.  Phosphorus 6.  Hypertension  - BP stable on nifedipine  Hyponatremia  - Na lower 126 on 10/21. Weight is stable from sat and pt states he is restricting FW intake. Still, reinforced today. Repeat labs this afternoon  Nausea  -Patient reports discomfort in the abdomen this morning.  No overnight acute events.  Primary team is evaluating and monitoring.    Anemia-  - On Epogen with dialysis    I have reviewed the nephrology recommendations including dialysis tomorrow, with primary team attending 64, and we are in agreement with renal plan including the information outlined above.     Subjective   Brief History of Admission  -64-year-old male medical history of ESRD on hemodialysis, poor compliance, presents with a fall.  Nephrology on board for ESRD.    Blood pressure is 140 systolic.  Afebrile.  On room air.  Standing scale weight today 97.1 kg.  Overall from Saturday, weight is slightly lower 5.4 kg.  Patient reports he is self restricting his water intake.  He states there is some abdominal discomfort today.    Objective :  Temp:  [98 °F (36.7 °C)-98.8 °F (37.1 °C)] 98 °F (36.7 °C)  HR:  [58-68] 63  BP: (114-147)/(52-60) 147/60  Resp:  [16-19] 18  SpO2:  [93 %-99 %] 97 %  O2 Device: None (Room air)    Current Weight: Weight - Scale: 97.1 kg (214 lb)  First Weight: Weight - Scale: 112 kg (245 lb 13 oz)  I/O         10/19 0701  10/20 0700 10/20 0701  10/21 0700 10/21 0701  10/22 0700    P.O. 290  240    I.V. (mL/kg) 510 (5.3)      Total Intake(mL/kg) 800 (8.3)  240 (2.5)    Urine (mL/kg/hr) 450 (0.2) 425 (0.2)     Other 2500      Total Output 2950 425     Net -2150 -425 +240                 Physical Exam  General: NAD  Skin: no rash  Eyes: anicteric sclera  ENT: moist mucous membrane  Neck: supple  Chest: CTA b/l, no ronchii, no wheeze, no rubs, no rales  CVS: s1s2, no murmur, no gallop, no rub  Abdomen: soft, nontender on my current exam, nl sounds  Extremities: no edema LE b/l but lower extremity wounds wrapped, tunneled catheter site without erythema or drainage  : no gaitan  Neuro: AAOX3  Psych: normal affect    Medications:    Current Facility-Administered Medications:     acetaminophen (Ofirmev) injection 1,000 mg, 1,000 mg, Intravenous, Q6H PRN, Lore Amadorar, DO, Last Rate: 400 mL/hr at 10/15/24 2122, 1,000 mg at 10/15/24 2122    allopurinol (ZYLOPRIM) tablet 100 mg, 100 mg, Oral, Daily, Lore Silver DO, 100 mg at 10/21/24 0854    aspirin chewable tablet 81 mg, 81 mg, Oral, Daily, Nirav Bruce MD, 81 mg at 10/21/24 0854    atorvastatin (LIPITOR) tablet 40 mg, 40 mg, Oral, Daily With Dinner, Lore Silver DO, 40 mg  at 10/20/24 1614    cefTRIAXone (ROCEPHIN) IVPB (premix in dextrose) 1,000 mg 50 mL, 1,000 mg, Intravenous, Q24H, Nirav Bruce MD, Last Rate: 100 mL/hr at 10/20/24 1614, 1,000 mg at 10/20/24 1614    docusate sodium (COLACE) capsule 100 mg, 100 mg, Oral, BID, Lore Amadorar DO, 100 mg at 10/21/24 0854    epoetin jessica (EPOGEN,PROCRIT) injection 3,000 Units, 3,000 Units, Intravenous, Once per day on Tuesday Thursday Saturday, Joselyn Reyes Bahamonde, MD, 3,000 Units at 10/19/24 1723    escitalopram (LEXAPRO) tablet 10 mg, 10 mg, Oral, Daily, Lore Silver DO, 10 mg at 10/21/24 0854    gabapentin (NEURONTIN) capsule 300 mg, 300 mg, Oral, BID, Gennaro Zelaya, DO, 300 mg at 10/21/24 0854    heparin (porcine) subcutaneous injection 5,000 Units, 5,000 Units, Subcutaneous, Q8H KEMAL, Nirav Bruce MD, 5,000 Units at 10/21/24 0503    hydrALAZINE (APRESOLINE) injection 10 mg, 10 mg, Intravenous, Q6H PRN, Lore Amadorar DO, 10 mg at 10/10/24 1944    influenza vaccine, high-dose (Fluzone High-Dose) IM injection 0.5 mL, 0.5 mL, Intramuscular, Once PRN, Lore Amadorar, DO    insulin lispro (HumALOG/ADMELOG) 100 units/mL subcutaneous injection 1-6 Units, 1-6 Units, Subcutaneous, TID AC **AND** Fingerstick Glucose (POCT), , , TID AC, Lore Amadorar, DO    labetalol (NORMODYNE) injection 10 mg, 10 mg, Intravenous, Q4H PRN, Lore Amadorar DO, 10 mg at 10/10/24 1822    NIFEdipine (PROCARDIA XL) 24 hr tablet 60 mg, 60 mg, Oral, After Dinner, Joselyn Reyes Bahamonde, MD, 60 mg at 10/20/24 1714    ondansetron (ZOFRAN) injection 4 mg, 4 mg, Intravenous, Q6H PRN, GRANT Rich, 4 mg at 10/20/24 0536    pantoprazole (PROTONIX) EC tablet 40 mg, 40 mg, Oral, Early Morning, Nirav Bruce MD, 40 mg at 10/21/24 0503    polyethylene glycol (MIRALAX) packet 17 g, 17 g, Oral, Daily PRN, Lore Silver DO, 17 g at 10/10/24 1841    sodium hypochlorite (DAKIN'S HALF-STRENGTH) 0.25 percent topical solution 1 Application, 1  "Application, Irrigation, Daily, Lore Silver DO, 1 Application at 10/20/24 1120      Lab Results: I have reviewed the following results:  Results from last 7 days   Lab Units 10/21/24  0431 10/20/24  0550 10/19/24  0616 10/18/24  0555 10/17/24  0752 10/16/24  2005 10/16/24  0355 10/15/24  0831   WBC Thousand/uL 7.16 7.79 6.66 7.82  --   --  6.82 6.01   HEMOGLOBIN g/dL 8.7* 8.4* 8.2* 8.2* 7.7* 7.6* 7.8* 7.6*   HEMATOCRIT % 26.9* 26.1* 26.0* 25.7* 24.8* 24.4* 24.4* 23.7*   PLATELETS Thousands/uL 290 274 269 285  --   --  210 213   POTASSIUM mmol/L 4.4 4.0 4.7 4.2 4.4  --  4.1 4.3   CHLORIDE mmol/L 89* 90* 94* 96 99  --  98 99   CO2 mmol/L 26 29 26 27 23  --  27 27   BUN mg/dL 40* 26* 43* 27* 40*  --  25 49*   CREATININE mg/dL 5.71* 4.15* 5.79* 4.35* 6.25*  --  4.62* 7.85*   CALCIUM mg/dL 8.3* 8.1* 8.1* 8.6 8.0*  --  8.2* 7.7*   MAGNESIUM mg/dL 2.3 1.7* 1.9 1.8* 1.8*  --   --  1.9   PHOSPHORUS mg/dL 6.0*  --  4.9* 3.3  --   --   --   --        Administrative Statements     Portions of the record may have been created with voice recognition software. Occasional wrong word or \"sound a like\" substitutions may have occurred due to the inherent limitations of voice recognition software. Read the chart carefully and recognize, using context, where substitutions have occurred.If you have any questions, please contact the dictating provider.  "

## 2024-10-21 NOTE — PROGRESS NOTES
Patient examined spoke to the nurse medical progress notes reviewed and noted.  Patient reports that he is feeling better but'' my sodium is low'' patient's sodium is 126 also I noted the other lab work patient's creatinine is 5.71 today.  Patient is pleasant remained stable at his baseline mentally he is depressed tolerating Lexapro well not confused no behavioral problem not agitated pleasant communicates well and thanking me for coming in talking to him.  Not suicidal.  Patient has ongoing chronic medical problems and treatment is in progress.  Patient lives alone and he does have a good support from the sister and brother-in-law.  Once patient is medically stable he wants to go home he is reminded on a daily basis that he will not miss his dialysis and take care of his medical needs as an outpatient regularly to stay healthy and stay home not end up in the hospital he understood and agreed.  Therapy is done with good response.  Continue his current medications the same and follow-up.

## 2024-10-21 NOTE — ASSESSMENT & PLAN NOTE
Patient reports nausea and fatigue  started overnight.     Neuro exam negative.   Denies chest pain / tightness / SOB   No peripheral edema noted      -replete electrolytes  -completed 3/3 abx for cystitis   -Management per hyponatremia/nephrology team following  -ESRD on dialysis; nephrology team following  -Specialist Recommending monitoring the patient for next 24 hours with plan for discharge on 10/22 if stable

## 2024-10-21 NOTE — ASSESSMENT & PLAN NOTE
-Patient reports discomfort in the abdomen this morning.  No overnight acute events.  Primary team is evaluating and monitoring.    Anemia-  - On Epogen with dialysis

## 2024-10-21 NOTE — PLAN OF CARE
Problem: Potential for Falls  Goal: Patient will remain free of falls  Description: INTERVENTIONS:  - Educate patient/family on patient safety including physical limitations  - Instruct patient to call for assistance with activity   - Consult OT/PT to assist with strengthening/mobility   - Keep Call bell within reach  - Keep bed low and locked with side rails adjusted as appropriate  - Keep care items and personal belongings within reach  - Initiate and maintain comfort rounds  - Make Fall Risk Sign visible to staff  - Offer Toileting every 2 Hours, in advance of need  - Initiate/Maintain bed alarm  - Obtain necessary fall risk management equipment: fall risk sign on door  - Apply yellow socks and bracelet for high fall risk patients  - Consider moving patient to room near nurses station  Outcome: Progressing     Problem: METABOLIC, FLUID AND ELECTROLYTES - ADULT  Goal: Electrolytes maintained within normal limits  Description: INTERVENTIONS:  - Monitor labs and assess patient for signs and symptoms of electrolyte imbalances  - Administer electrolyte replacement as ordered  - Monitor response to electrolyte replacements, including repeat lab results as appropriate  - Instruct patient on fluid and nutrition as appropriate  Outcome: Progressing  Goal: Fluid balance maintained  Description: INTERVENTIONS:  - Monitor labs   - Monitor I/O and WT  - Instruct patient on fluid and nutrition as appropriate  - Assess for signs & symptoms of volume excess or deficit  Outcome: Progressing     Problem: MUSCULOSKELETAL - ADULT  Goal: Maintain or return mobility to safest level of function  Description: INTERVENTIONS:  - Assess patient's ability to carry out ADLs; assess patient's baseline for ADL function and identify physical deficits which impact ability to perform ADLs (bathing, care of mouth/teeth, toileting, grooming, dressing, etc.)  - Assess/evaluate cause of self-care deficits   - Assess range of motion  - Assess  patient's mobility  - Assess patient's need for assistive devices and provide as appropriate  - Encourage maximum independence but intervene and supervise when necessary  - Involve family in performance of ADLs  - Assess for home care needs following discharge   - Consider OT consult to assist with ADL evaluation and planning for discharge  - Provide patient education as appropriate  Outcome: Progressing  Goal: Maintain proper alignment of affected body part  Description: INTERVENTIONS:  - Support, maintain and protect limb and body alignment  - Provide patient/ family with appropriate education  Outcome: Progressing     Problem: NEUROSENSORY - ADULT  Goal: Achieves stable or improved neurological status  Description: INTERVENTIONS  - Monitor and report changes in neurological status  - Monitor vital signs such as temperature, blood pressure, glucose, and any other labs ordered       Outcome: Progressing  Goal: Achieves maximal functionality and self care  Description: INTERVENTIONS  - Monitor swallowing and airway patency with patient fatigue and changes in neurological status  - Encourage and assist patient to increase activity and self care.   - Encourage visually impaired, hearing impaired and aphasic patients to use assistive/communication devices  Outcome: Progressing     Problem: Prexisting or High Potential for Compromised Skin Integrity  Goal: Skin integrity is maintained or improved  Description: INTERVENTIONS:  - Identify patients at risk for skin breakdown  - Assess and monitor skin integrity  - Assess and monitor nutrition and hydration status  - Monitor labs   - Assess for incontinence   - Turn and reposition patient  - Assist with mobility/ambulation  - Relieve pressure over bony prominences  - Avoid friction and shearing  - Provide appropriate hygiene as needed including keeping skin clean and dry  - Evaluate need for skin moisturizer/barrier cream  - Collaborate with interdisciplinary team   -  Patient/family teaching  - Consider wound care consult   Outcome: Progressing     Problem: PAIN - ADULT  Goal: Verbalizes/displays adequate comfort level or baseline comfort level  Description: Interventions:  - Encourage patient to monitor pain and request assistance  - Assess pain using appropriate pain scale  - Administer analgesics based on type and severity of pain and evaluate response  - Implement non-pharmacological measures as appropriate and evaluate response  - Consider cultural and social influences on pain and pain management  - Notify physician/advanced practitioner if interventions unsuccessful or patient reports new pain  Outcome: Progressing     Problem: INFECTION - ADULT  Goal: Absence or prevention of progression during hospitalization  Description: INTERVENTIONS:  - Assess and monitor for signs and symptoms of infection  - Monitor lab/diagnostic results  - Monitor all insertion sites, i.e. indwelling lines, tubes, and drains  - Administer medications as ordered  - Instruct and encourage patient and family to use good hand hygiene technique  - Identify and instruct in appropriate isolation precautions for identified infection/condition  Outcome: Progressing  Goal: Absence of fever/infection during neutropenic period  Description: INTERVENTIONS:  - Monitor WBC    Outcome: Progressing     Problem: SAFETY ADULT  Goal: Patient will remain free of falls  Description: INTERVENTIONS:  - Educate patient/family on patient safety including physical limitations  - Instruct patient to call for assistance with activity   - Consult OT/PT to assist with strengthening/mobility   - Keep Call bell within reach  - Keep bed low and locked with side rails adjusted as appropriate  - Keep care items and personal belongings within reach  - Initiate and maintain comfort rounds  - Make Fall Risk Sign visible to staff  - Offer Toileting every 2 Hours, in advance of need  - Initiate/Maintain bed alarm  - Obtain necessary  fall risk management equipment: fall risk sign on door  - Apply yellow socks and bracelet for high fall risk patients  - Consider moving patient to room near nurses station  Outcome: Progressing  Goal: Maintain or return to baseline ADL function  Description: INTERVENTIONS:  -  Assess patient's ability to carry out ADLs; assess patient's baseline for ADL function and identify physical deficits which impact ability to perform ADLs (bathing, care of mouth/teeth, toileting, grooming, dressing, etc.)  - Assess/evaluate cause of self-care deficits   - Assess range of motion  - Assess patient's mobility; develop plan if impaired  - Assess patient's need for assistive devices and provide as appropriate  - Encourage maximum independence but intervene and supervise when necessary  - Involve family in performance of ADLs  - Assess for home care needs following discharge   - Consider OT consult to assist with ADL evaluation and planning for discharge  - Provide patient education as appropriate  Outcome: Progressing  Goal: Maintains/Returns to pre admission functional level  Description: INTERVENTIONS:  - Perform AM-PAC 6 Click Basic Mobility/ Daily Activity assessment daily.  - Set and communicate daily mobility goal to care team and patient/family/caregiver.   - Collaborate with rehabilitation services on mobility goals if consulted  - Perform Range of Motion 2 times a day.  - Reposition patient every 2 hours.  - Dangle patient 2 times a day  - Stand patient 2 times a day  - Ambulate patient 3 times a day  - Out of bed to chair 3 times a day   - Out of bed for meals 3 times a day  - Out of bed for toileting  - Record patient progress and toleration of activity level   Outcome: Progressing     Problem: DISCHARGE PLANNING  Goal: Discharge to home or other facility with appropriate resources  Description: INTERVENTIONS:  - Identify barriers to discharge w/patient and caregiver  - Arrange for needed discharge resources and  transportation as appropriate  - Identify discharge learning needs (meds, wound care, etc.)  - Arrange for interpretive services to assist at discharge as needed  - Refer to Case Management Department for coordinating discharge planning if the patient needs post-hospital services based on physician/advanced practitioner order or complex needs related to functional status, cognitive ability, or social support system  Outcome: Progressing     Problem: Knowledge Deficit  Goal: Patient/family/caregiver demonstrates understanding of disease process, treatment plan, medications, and discharge instructions  Description: Complete learning assessment and assess knowledge base.  Interventions:  - Provide teaching at level of understanding  - Provide teaching via preferred learning methods  Outcome: Progressing     Problem: Nutrition/Hydration-ADULT  Goal: Nutrient/Hydration intake appropriate for improving, restoring or maintaining nutritional needs  Description: Monitor and assess patient's nutrition/hydration status for malnutrition. Collaborate with interdisciplinary team and initiate plan and interventions as ordered.  Monitor patient's weight and dietary intake as ordered or per policy. Utilize nutrition screening tool and intervene as necessary. Determine patient's food preferences and provide high-protein, high-caloric foods as appropriate.     INTERVENTIONS:  - Monitor oral intake, urinary output, labs, and treatment plans  - Assess nutrition and hydration status and recommend course of action  - Evaluate amount of meals eaten  - Assist patient with eating if necessary   - Allow adequate time for meals  - Recommend/ encourage appropriate diets, oral nutritional supplements, and vitamin/mineral supplements  - Order, calculate, and assess calorie counts as needed  - Recommend, monitor, and adjust tube feedings and TPN/PPN based on assessed needs  - Assess need for intravenous fluids  - Provide specific  nutrition/hydration education as appropriate  - Include patient/family/caregiver in decisions related to nutrition  Outcome: Progressing     Problem: CARDIOVASCULAR - ADULT  Goal: Maintains optimal cardiac output and hemodynamic stability  Description: INTERVENTIONS:  - Monitor I/O, vital signs and rhythm  - Monitor for S/S and trends of decreased cardiac output  - Administer and titrate ordered vasoactive medications to optimize hemodynamic stability  - Assess quality of pulses, skin color and temperature  - Assess for signs of decreased coronary artery perfusion  - Instruct patient to report change in severity of symptoms  Outcome: Progressing  Goal: Absence of cardiac dysrhythmias or at baseline rhythm  Description: INTERVENTIONS:  - Continuous cardiac monitoring, vital signs, obtain 12 lead EKG if ordered  - Administer antiarrhythmic and heart rate control medications as ordered  - Monitor electrolytes and administer replacement therapy as ordered  Outcome: Progressing     Problem: RESPIRATORY - ADULT  Goal: Achieves optimal ventilation and oxygenation  Description: INTERVENTIONS:  - Assess for changes in respiratory status  - Assess for changes in mentation and behavior  - Position to facilitate oxygenation and minimize respiratory effort  - Oxygen administered by appropriate delivery if ordered  - Encourage broncho-pulmonary hygiene including cough, deep breathe, Incentive Spirometry  - Assess the need for suctioning and aspirate as needed  - Assess and instruct to report SOB or any respiratory difficulty  - Respiratory Therapy support as indicated  Outcome: Progressing     Problem: HEMATOLOGIC - ADULT  Goal: Maintains hematologic stability  Description: INTERVENTIONS  - Assess for signs and symptoms of bleeding or hemorrhage  - Monitor labs  - Administer supportive blood products/factors as ordered and appropriate  Outcome: Progressing     Problem: Communication Impairment  Goal: Ability to express needs and  understand communication  Description: Assess patient's communication skills and ability to understand information.  Patient will demonstrate use of effective communication techniques, alternative methods of communication and understanding even if not able to speak.     - Encourage communication and provide alternate methods of communication as needed.  - Collaborate with case management/ for discharge needs.  - Include patient/family/caregiver in decisions related to communication.  Outcome: Progressing     Problem: Potential for Aspiration  Goal: Non-ventilated patient's risk of aspiration is minimized  Description: Assess and monitor vital signs, respiratory status, and labs (WBC).  Monitor for signs of aspiration (tachypnea, cough, rales, wheezing, cyanosis, fever).    - Assess and monitor patient's ability to swallow.  - Place patient up in chair to eat if possible.  - HOB up at 90 degrees to eat if unable to get patient up into chair.  - Supervise patient during oral intake.   - Instruct patient/ family to take small bites.  - Instruct patient/ family to take small single sips when taking liquids.  - Follow patient-specific strategies generated by speech pathologist.  Outcome: Progressing     Problem: Neurological Deficit  Goal: Neurological status is stable or improving  Description: Interventions:  - Monitor and assess patient's level of consciousness, motor function, sensory function, and level of assistance needed for ADLs.   - Monitor and report changes from baseline. Collaborate with interdisciplinary team to initiate plan and implement interventions as ordered.   - Provide and maintain a safe environment.  - Consider seizure precautions.  - Consider fall precautions.  - Consider aspiration precautions.  - Consider bleeding precautions.  Outcome: Progressing     Problem: Activity Intolerance/Impaired Mobility  Goal: Mobility/activity is maintained at optimum level for patient  Description:  Interventions:  - Assess and monitor patient  barriers to mobility and need for assistive/adaptive devices.  - Assess patient's emotional response to limitations.  - Collaborate with interdisciplinary team and initiate plans and interventions as ordered.  - Encourage independent activity per ability.  - Maintain proper body alignment.  - Perform active rom as tolerated/ordered.  - Plan activities to conserve energy.  - Turn patient as appropriate  Outcome: Progressing     Problem: Nutrition  Goal: Nutrition/Hydration status is improving  Description: Monitor and assess patient's nutrition/hydration status for malnutrition (ex- brittle hair, bruises, dry skin, pale skin and conjunctiva, muscle wasting, smooth red tongue, and disorientation). Collaborate with interdisciplinary team and initiate plan and interventions as ordered.  Monitor patient's weight and dietary intake as ordered or per policy. Utilize nutrition screening tool and intervene per policy. Determine patient's food preferences and provide high-protein, high-caloric foods as appropriate.     - Assist patient with eating.  - Allow adequate time for meals.  - Encourage patient to take dietary supplement as ordered.  - Collaborate with clinical nutritionist.  - Include patient/family/caregiver in decisions related to nutrition.  Outcome: Progressing

## 2024-10-21 NOTE — ASSESSMENT & PLAN NOTE
- per primary team  - Initial acute trauma workup without acute abnormality  - Patient with cystitis treatment by primary team

## 2024-10-21 NOTE — ASSESSMENT & PLAN NOTE
- trend phos for now.  Is on sevelamer as outpatient.  Currently held due to GI discomfort.  Phosphorus 6.

## 2024-10-21 NOTE — ASSESSMENT & PLAN NOTE
- Na lower 126 on 10/21. Weight is stable from sat and pt states he is restricting FW intake. Still, reinforced today. Repeat labs this afternoon

## 2024-10-21 NOTE — ASSESSMENT & PLAN NOTE
Lab Results   Component Value Date    HGBA1C 5.5 10/08/2024       Recent Labs     10/20/24  1120 10/20/24  1559 10/20/24  2041 10/21/24  0707   POCGLU 97 146* 97 100   SSI  Diabetic diet

## 2024-10-22 ENCOUNTER — APPOINTMENT (INPATIENT)
Dept: DIALYSIS | Facility: HOSPITAL | Age: 64
DRG: 640 | End: 2024-10-22
Attending: STUDENT IN AN ORGANIZED HEALTH CARE EDUCATION/TRAINING PROGRAM
Payer: MEDICARE

## 2024-10-22 VITALS
DIASTOLIC BLOOD PRESSURE: 59 MMHG | TEMPERATURE: 98.1 F | SYSTOLIC BLOOD PRESSURE: 162 MMHG | HEART RATE: 63 BPM | RESPIRATION RATE: 16 BRPM | HEIGHT: 73 IN | OXYGEN SATURATION: 99 % | WEIGHT: 215.39 LBS | BODY MASS INDEX: 28.55 KG/M2

## 2024-10-22 LAB
ANION GAP SERPL CALCULATED.3IONS-SCNC: 11 MMOL/L (ref 4–13)
BUN SERPL-MCNC: 53 MG/DL (ref 5–25)
CALCIUM SERPL-MCNC: 8.1 MG/DL (ref 8.4–10.2)
CHLORIDE SERPL-SCNC: 90 MMOL/L (ref 96–108)
CO2 SERPL-SCNC: 26 MMOL/L (ref 21–32)
CREAT SERPL-MCNC: 7.35 MG/DL (ref 0.6–1.3)
ERYTHROCYTE [DISTWIDTH] IN BLOOD BY AUTOMATED COUNT: 16.2 % (ref 11.6–15.1)
GFR SERPL CREATININE-BSD FRML MDRD: 7 ML/MIN/1.73SQ M
GLUCOSE SERPL-MCNC: 109 MG/DL (ref 65–140)
GLUCOSE SERPL-MCNC: 122 MG/DL (ref 65–140)
GLUCOSE SERPL-MCNC: 98 MG/DL (ref 65–140)
HCT VFR BLD AUTO: 24.9 % (ref 36.5–49.3)
HGB BLD-MCNC: 8.1 G/DL (ref 12–17)
MAGNESIUM SERPL-MCNC: 2.3 MG/DL (ref 1.9–2.7)
MCH RBC QN AUTO: 29.9 PG (ref 26.8–34.3)
MCHC RBC AUTO-ENTMCNC: 32.5 G/DL (ref 31.4–37.4)
MCV RBC AUTO: 92 FL (ref 82–98)
PHOSPHATE SERPL-MCNC: 6.8 MG/DL (ref 2.3–4.1)
PLATELET # BLD AUTO: 293 THOUSANDS/UL (ref 149–390)
PMV BLD AUTO: 10 FL (ref 8.9–12.7)
POTASSIUM SERPL-SCNC: 4.5 MMOL/L (ref 3.5–5.3)
RBC # BLD AUTO: 2.71 MILLION/UL (ref 3.88–5.62)
SODIUM SERPL-SCNC: 127 MMOL/L (ref 135–147)
WBC # BLD AUTO: 7.67 THOUSAND/UL (ref 4.31–10.16)

## 2024-10-22 PROCEDURE — 83735 ASSAY OF MAGNESIUM: CPT | Performed by: INTERNAL MEDICINE

## 2024-10-22 PROCEDURE — 99232 SBSQ HOSP IP/OBS MODERATE 35: CPT | Performed by: INTERNAL MEDICINE

## 2024-10-22 PROCEDURE — 84100 ASSAY OF PHOSPHORUS: CPT | Performed by: INTERNAL MEDICINE

## 2024-10-22 PROCEDURE — 85027 COMPLETE CBC AUTOMATED: CPT | Performed by: INTERNAL MEDICINE

## 2024-10-22 PROCEDURE — 82948 REAGENT STRIP/BLOOD GLUCOSE: CPT

## 2024-10-22 PROCEDURE — 80048 BASIC METABOLIC PNL TOTAL CA: CPT | Performed by: INTERNAL MEDICINE

## 2024-10-22 PROCEDURE — 99239 HOSP IP/OBS DSCHRG MGMT >30: CPT | Performed by: STUDENT IN AN ORGANIZED HEALTH CARE EDUCATION/TRAINING PROGRAM

## 2024-10-22 RX ORDER — ASPIRIN 81 MG/1
81 TABLET, CHEWABLE ORAL DAILY
Qty: 30 TABLET | Refills: 0 | Status: SHIPPED | OUTPATIENT
Start: 2024-10-23 | End: 2024-11-22

## 2024-10-22 RX ORDER — ESCITALOPRAM OXALATE 10 MG/1
10 TABLET ORAL DAILY
Qty: 30 TABLET | Refills: 0 | Status: SHIPPED | OUTPATIENT
Start: 2024-10-23 | End: 2024-11-22

## 2024-10-22 RX ORDER — PANTOPRAZOLE SODIUM 40 MG/1
40 TABLET, DELAYED RELEASE ORAL
Qty: 30 TABLET | Refills: 0 | Status: SHIPPED | OUTPATIENT
Start: 2024-10-23

## 2024-10-22 RX ORDER — NIFEDIPINE 30 MG/1
60 TABLET, EXTENDED RELEASE ORAL
Qty: 60 TABLET | Refills: 0 | Status: SHIPPED | OUTPATIENT
Start: 2024-10-22 | End: 2024-11-21

## 2024-10-22 RX ADMIN — ALLOPURINOL 100 MG: 100 TABLET ORAL at 12:06

## 2024-10-22 RX ADMIN — HEPARIN SODIUM 5000 UNITS: 5000 INJECTION, SOLUTION INTRAVENOUS; SUBCUTANEOUS at 06:06

## 2024-10-22 RX ADMIN — ESCITALOPRAM OXALATE 10 MG: 10 TABLET ORAL at 12:05

## 2024-10-22 RX ADMIN — HEPARIN SODIUM 5000 UNITS: 5000 INJECTION, SOLUTION INTRAVENOUS; SUBCUTANEOUS at 14:07

## 2024-10-22 RX ADMIN — ASPIRIN 81 MG CHEWABLE TABLET 81 MG: 81 TABLET CHEWABLE at 12:06

## 2024-10-22 RX ADMIN — HYOSCYAMINE SULFATE 1 APPLICATION: 16 SOLUTION at 12:05

## 2024-10-22 RX ADMIN — ATORVASTATIN CALCIUM 40 MG: 40 TABLET, FILM COATED ORAL at 15:54

## 2024-10-22 RX ADMIN — PANTOPRAZOLE SODIUM 40 MG: 40 TABLET, DELAYED RELEASE ORAL at 06:06

## 2024-10-22 RX ADMIN — EPOETIN ALFA 3000 UNITS: 3000 SOLUTION INTRAVENOUS; SUBCUTANEOUS at 12:02

## 2024-10-22 RX ADMIN — DOCUSATE SODIUM 100 MG: 100 CAPSULE, LIQUID FILLED ORAL at 12:06

## 2024-10-22 RX ADMIN — GABAPENTIN 300 MG: 300 CAPSULE ORAL at 10:24

## 2024-10-22 NOTE — ASSESSMENT & PLAN NOTE
-Patient reports slight discomfort in the abdomen this morning.  No overnight acute events.  Primary team is evaluating and monitoring.    Anemia-  - On Epogen with dialysis

## 2024-10-22 NOTE — ASSESSMENT & PLAN NOTE
Lab Results   Component Value Date    EGFR 7 10/22/2024    EGFR 8 10/21/2024    EGFR 9 10/21/2024    CREATININE 7.35 (H) 10/22/2024    CREATININE 6.34 (H) 10/21/2024    CREATININE 5.71 (H) 10/21/2024     Patient gets dialysis Tuesday, Thursday, Saturday and has missed dialysis for about a week since being discharged from short-term rehab.  Patient was scheduled for dialysis at a different facility but family stated that they cannot transport him for his appointments  Nephrology following  Continued on TTS schedule  Follows up with [facility] dialysis center.  Case management confirmed scheduling for dialysis center.  Continue Renagel for hyperphosphatemia

## 2024-10-22 NOTE — NURSING NOTE
Patient discharged via wheelchair to home with Ambucab.  Discharge instructions, follow-up visits and medicine reconciliation explained. Belongings accounted for.  All questions answered.

## 2024-10-22 NOTE — ASSESSMENT & PLAN NOTE
Lab Results   Component Value Date    EGFR 7 10/22/2024    EGFR 8 10/21/2024    EGFR 9 10/21/2024    CREATININE 7.35 (H) 10/22/2024    CREATININE 6.34 (H) 10/21/2024    CREATININE 5.71 (H) 10/21/2024   Cntinue TTS schedule for dialysis

## 2024-10-22 NOTE — PROGRESS NOTES
Progress Note - Nephrology   Name: Naresh Carpio 64 y.o. male I MRN: [id number]  Unit/Bed#: 4 [facility] 414-01 I Date of Admission: 10/8/2024   Date of Service: 10/22/2024 I Hospital Day: 13    Assessment & Plan  ESRD (end stage renal disease) ([facility])  Outpatient unit [facility]  - Patient has significant issues with compliance for dialysis  - Access-PermCath with right IJ tunneled dialysis catheter  - Patient missed approximately 1 week of dialysis since being discharged from short-term rehab  - 10/21-no urgent indication for dialysis.  Sodium borderline low and we will repeat.  Reinforced free water restriction  - 10/22-dialysis.  Sodium level slightly improved 127.  Being dialyzed today and ultrafiltrating which will assist in free water clearance and raising sodium    Plan  - Dialysis TTS-dialysis today  - Social work involvement for dialysis transportation  - Trend phosphorus for now  - Restart sevelamer likely on discharge.    NOTE WAS ADDENDED 10/25. PT WAS SEEN ON 10/22. 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  Is on sevelamer as outpatient.  Currently held due to GI discomfort.  Phosphorus 6.8.  Likely restart sevelamer on discharge  Hypertension  - BP stable on nifedipine  Hyponatremia  -In the setting of lack of free water clearance in the setting of ESRD.  Monitor with ultrafiltration.  Nausea  -Patient reports slight discomfort in the abdomen this morning.  No overnight acute events.  Primary team is evaluating and monitoring.    Anemia-  - On Epogen with dialysis    I have reviewed the nephrology recommendations including HD, with primary team Attending, and we are in agreement with renal plan including the information outlined  above.     Subjective   Brief History of Admission - 64-year-old male medical history of ESRD on hemodialysis, poor compliance, presents with a fall. Nephrology on board for ESRD.     Patient still with nausea.  Otherwise no complaints.  Blood pressures 1 40-1 50s systolic.  Afebrile.  On room air.    Objective :  Temp:  [97.9 °F (36.6 °C)-98.2 °F (36.8 °C)] 98.2 °F (36.8 °C)  HR:  [56-64] 56  BP: (145-154)/(58-60) 145/59  Resp:  [18] 18  SpO2:  [95 %-100 %] 99 %  O2 Device: None (Room air)    Current Weight: Weight - Scale: 97.7 kg (215 lb 6.2 oz)  First Weight: Weight - Scale: 112 kg (245 lb 13 oz)  I/O         10/20 0701  10/21 0700 10/21 0701  10/22 0700 10/22 0701  10/23 0700    P.O.  630     I.V. (mL/kg)   200 (2)    Total Intake(mL/kg)  630 (6.4) 200 (2)    Urine (mL/kg/hr) 425 (0.2) 550 (0.2)     Other       Total Output 425 550     Net -425 +80 +200                 Physical Exam  General: NAD  Skin: no rash  Eyes: anicteric sclera  ENT: moist mucous membrane  Neck: supple  Chest: CTA b/l, no ronchii, no wheeze, no rubs, no rales  CVS: s1s2, no murmur, no gallop, no rub  Abdomen: soft, nontender, nl sounds  Extremities: Trace to 1+ edema LE b/l  : no gaitan  Neuro: AAOX3  Psych: normal affect    Medications:    Current Facility-Administered Medications:     allopurinol (ZYLOPRIM) tablet 100 mg, 100 mg, Oral, Daily, Lore Silver DO, 100 mg at 10/21/24 0854    aspirin chewable tablet 81 mg, 81 mg, Oral, Daily, Nirav Bruce MD, 81 mg at 10/21/24 0854    atorvastatin (LIPITOR) tablet 40 mg, 40 mg, Oral, Daily With Dinner, Lore Silver DO, 40 mg at 10/21/24 1533    docusate sodium (COLACE) capsule 100 mg, 100 mg, Oral, BID, Lore Silver DO, 100 mg at 10/21/24 1812    epoetin jessica (EPOGEN,PROCRIT) injection 3,000 Units, 3,000 Units, Intravenous, Once per day on Tuesday Thursday Saturday, Joselyn Reyes Bahamonde, MD, 3,000 Units at 10/19/24 1723    escitalopram (LEXAPRO) tablet 10 mg, 10 mg, Oral,  Daily, Lore Silver DO, 10 mg at 10/21/24 0854    gabapentin (NEURONTIN) capsule 300 mg, 300 mg, Oral, BID, Gennaro Zelaya DO, 300 mg at 10/21/24 1812    heparin (porcine) subcutaneous injection 5,000 Units, 5,000 Units, Subcutaneous, Q8H KEMAL, Nirav Bruce MD, 5,000 Units at 10/22/24 0606    influenza vaccine, high-dose (Fluzone High-Dose) IM injection 0.5 mL, 0.5 mL, Intramuscular, Once PRN, Lore Silver DO    insulin lispro (HumALOG/ADMELOG) 100 units/mL subcutaneous injection 1-6 Units, 1-6 Units, Subcutaneous, TID AC **AND** Fingerstick Glucose (POCT), , , TID AC, Lore Silver DO    NIFEdipine (PROCARDIA XL) 24 hr tablet 60 mg, 60 mg, Oral, After Dinner, Joselyn Reyes Bahamonde, MD, 60 mg at 10/21/24 1812    ondansetron (ZOFRAN) injection 4 mg, 4 mg, Intravenous, Q6H PRN, GRANT Rich, 4 mg at 10/20/24 0536    pantoprazole (PROTONIX) EC tablet 40 mg, 40 mg, Oral, Early Morning, Nirav Bruce MD, 40 mg at 10/22/24 0606    polyethylene glycol (MIRALAX) packet 17 g, 17 g, Oral, Daily PRN, Lore Silver DO, 17 g at 10/10/24 1841    sodium hypochlorite (DAKIN'S HALF-STRENGTH) 0.25 percent topical solution 1 Application, 1 Application, Irrigation, Daily, Lore Silver DO, 1 Application at 10/21/24 1051      Lab Results: I have reviewed the following results:  Results from last 7 days   Lab Units 10/22/24  0558 10/21/24  1221 10/21/24  0431 10/20/24  0550 10/19/24  0616 10/18/24  0555 10/17/24  0752 10/16/24  2005 10/16/24  0355   WBC Thousand/uL 7.67  --  7.16 7.79 6.66 7.82  --   --  6.82   HEMOGLOBIN g/dL 8.1*  --  8.7* 8.4* 8.2* 8.2* 7.7* 7.6* 7.8*   HEMATOCRIT % 24.9*  --  26.9* 26.1* 26.0* 25.7* 24.8* 24.4* 24.4*   PLATELETS Thousands/uL 293  --  290 274 269 285  --   --  210   POTASSIUM mmol/L 4.5 4.5 4.4 4.0 4.7 4.2 4.4  --  4.1   CHLORIDE mmol/L 90* 89* 89* 90* 94* 96 99  --  98   CO2 mmol/L 26 26 26 29 26 27 23  --  27   BUN mg/dL 53* 44* 40* 26* 43* 27* 40*  --  25  "  CREATININE mg/dL 7.35* 6.34* 5.71* 4.15* 5.79* 4.35* 6.25*  --  4.62*   CALCIUM mg/dL 8.1* 8.7 8.3* 8.1* 8.1* 8.6 8.0*  --  8.2*   MAGNESIUM mg/dL 2.3  --  2.3 1.7* 1.9 1.8* 1.8*  --   --    PHOSPHORUS mg/dL 6.8*  --  6.0*  --  4.9* 3.3  --   --   --        Administrative Statements     Portions of the record may have been created with voice recognition software. Occasional wrong word or \"sound a like\" substitutions may have occurred due to the inherent limitations of voice recognition software. Read the chart carefully and recognize, using context, where substitutions have occurred.If you have any questions, please contact the dictating provider.  "

## 2024-10-22 NOTE — PROGRESS NOTES
Patient examined spoke to the nurse nephrology progress notes reviewed and noted.  Patient is pleasant cooperative and he reports that he is feeling better and he is planning to go home later today nurse reports that he is scheduled for the discharge.  Patient is stable at his baseline he is not confused he is pleasant affect is brighter communicates well he is stable with the depression no psychosis no hallucination not suicidal not agitated not confused not lethargic.  Nurse reported no behavioral problem.  Patient is on dialysis today.  He is today's creatinine is 7.35 noted.  I had a long discussion again today with the patient that he will comply with his medical care after the discharge and he will not miss the dialysis he understood and agreed that if he does not comply with his needed medical care he can get sick and he may wind up back in the hospital he does not want that he wants to go home he has good support from sister and the brother-in-law.  Patient is stable continue his Lexapro the same at this time.

## 2024-10-22 NOTE — ASSESSMENT & PLAN NOTE
Lab Results   Component Value Date    HGBA1C 5.5 10/08/2024       Recent Labs     10/21/24  1559 10/21/24  2007 10/22/24  0732 10/22/24  1118   POCGLU 110 115 109 98   [facility]  Diabetic diet

## 2024-10-22 NOTE — ASSESSMENT & PLAN NOTE
CT head, CT c-spine, and XR R knee negative for acute abnormality on admission  PT/OT saw patient again on 10/15 as patient was seen concerns regarding ability to climb 8 steps into the home.  PT eventually recommended outpatient therapy  Patient declined home health services

## 2024-10-22 NOTE — PLAN OF CARE
Target UF Goal  2-2.5  L as tolerated. Patient dialyzing for  3.5 hours  on 3 K bath for serum K of  4.5  per protocol. Treatment plan reviewed with Dr. Landa on bedside rounds.   Problem: METABOLIC, FLUID AND ELECTROLYTES - ADULT  Goal: Electrolytes maintained within normal limits  Description: INTERVENTIONS:  - Monitor labs and assess patient for signs and symptoms of electrolyte imbalances  - Administer electrolyte replacement as ordered  - Monitor response to electrolyte replacements, including repeat lab results as appropriate  - Instruct patient on fluid and nutrition as appropriate  Outcome: Progressing  Goal: Fluid balance maintained  Description: INTERVENTIONS:  - Monitor labs   - Monitor I/O and WT  - Instruct patient on fluid and nutrition as appropriate  - Assess for signs & symptoms of volume excess or deficit  Outcome: Progressing   Post-Dialysis RN Treatment Note    Blood Pressure:  Pre 145/59 mm/Hg  Post 149/63 mmHg   EDW  tbd kg    Weight:  Pre 97.7 kg   Post 94.8 kg   Mode of weight measurement: Standing Scale   Volume Removed  2500 ml    Treatment duration 210 minutes    NS given  No    Treatment shortened? No   Medications given during Rx Epogen 3000 units   Estimated Kt/V  None Reported   Access type: Permacath/TDC   Access Issues: No    Report called to primary nurse   Yes Rosa Mata RN

## 2024-10-22 NOTE — CASE MANAGEMENT
Case Management Discharge Planning Note    Patient name Naresh Carpio  Location [facility] [place] [facility] [place] [facility]-* MRN [id number]  : 1960 Date 10/22/2024       Current Admission Date: 10/8/2024  Current Admission Diagnosis:Anemia due to chronic kidney disease, on chronic dialysis (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) 10/14/2024     Falls 10/08/2024     PVD (peripheral vascular disease) ([facility]) 10/08/2024     Closed fracture of right pelvis ([facility]) 2024     Bilateral sacral fracture, closed ([facility]) 2024     Difficulty with speech 2024     History of amputation of hallux ([facility]) 2024     Hypertensive urgency 2024     Facial cellulitis 2024     Constipation 2023     Bradycardia 2023     Cellulitis of right foot      Polymicrobial bacterial infection 2023     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 mellitus, with necrosis of bone ([facility])      Acquired deformity of foot, right      Charcot's joint      Subacute osteomyelitis of right foot ([facility])      Open wound of right foot 2023     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 mellitus, with bone involvement without evidence of necrosis ([facility])      Diabetic ulcer of left midfoot associated with type 2 diabetes mellitus, limited to breakdown of skin ([facility])      Diabetic polyneuropathy associated with type 2 diabetes mellitus ([facility])      Diabetes mellitus type 2 with peripheral artery disease ([facility])      History of amputation of lesser toe of left foot ([facility])      Onychomycosis      Status post fall 2023     Traumatic rhabdomyolysis ([facility]) 2023     Leukocytosis 2023     Osteoarthritis of left hip 2022     Severe Left Orchalgia 2022     Right shoulder  pain 07/26/2022     Left hip pain 07/06/2022     Hemodialysis status (HCC)      Hypervolemia      Anemia due to chronic kidney disease, on chronic dialysis (HCC) 01/21/2022     Type 2 diabetes mellitus, without long-term current use of insulin (HCC)      Hypertension      History of TIA (transient ischemic attack)      T10 vertebral fracture (HCC)        LOS (days): 13  Geometric Mean LOS (GMLOS) (days): 4.6  Days to GMLOS:-8.3     OBJECTIVE:  Risk of Unplanned Readmission Score: 35.17      Current admission status: Inpatient   Preferred Pharmacy:   [id number] - [place], [place] - [address] [facility] [address]  [address] [address]  Phone: [phone] Fax: [phone]    Primary Care Provider: No primary care provider on file.    Primary Insurance: MEDICARE  Secondary Insurance:     DISCHARGE DETAILS:    Discharge planning discussed with:: Patient  Freedom of Choice: Yes  Comments - Freedom of Choice: Patient requesting assistance with [facility] transport home. Patient notes that he has keys to enter home but would like someone with him for supervision on the stairs.    Other Referral/Resources/Interventions Provided:  Interventions: Transportation  Referral Comments: [facility] transport arranged for 1600 pickup. Attending and unit nurse aware.     Treatment Team Recommendation: Home with Home Health Care  Discharge Destination Plan:: Home  Transport at Discharge : [facility]     Number/Name of Dispatcher: [facility]  Transported by (Company and Unit #): [facility]  ETA of Transport (Date): 10/22/24  ETA of Transport (Time): 1600

## 2024-10-22 NOTE — ASSESSMENT & PLAN NOTE
- trend phos for now.  Is on sevelamer as outpatient.  Currently held due to GI discomfort.  Phosphorus 6.8.  Likely restart sevelamer on discharge

## 2024-10-22 NOTE — ASSESSMENT & PLAN NOTE
-In the setting of lack of free water clearance in the setting of ESRD.  Monitor with ultrafiltration.

## 2024-10-22 NOTE — DISCHARGE SUMMARY
Discharge Summary - Hospitalist   Name: Naresh Carpio 64 y.o. male I MRN: [id number]  Unit/Bed#: [address] [place] [address]-01 I Date of Admission: 10/8/2024   Date of Service: 10/22/2024 I Hospital Day: 13     Assessment & Plan  Anemia due to chronic kidney disease, on chronic dialysis ([facility])  Lab Results   Component Value Date    EGFR 7 10/22/2024    EGFR 8 10/21/2024    EGFR 9 10/21/2024    CREATININE 7.35 (H) 10/22/2024    CREATININE 6.34 (H) 10/21/2024    CREATININE 5.71 (H) 10/21/2024     Patient gets dialysis Tuesday, Thursday, Saturday and has missed dialysis for about a week since being discharged from short-term rehab.  Patient was scheduled for dialysis at a different facility but family stated that they cannot transport him for his appointments  Nephrology following  Continued on TTS schedule  Follows up with [facility] dialysis center.  Case management confirmed scheduling for dialysis center.  Continue Renagel for hyperphosphatemia  Type 2 diabetes mellitus, without long-term current use of insulin ([facility])  Lab Results   Component Value Date    HGBA1C 5.5 10/08/2024       Recent Labs     10/21/24  1559 10/21/24  2007 10/22/24  0732 10/22/24  1118   POCGLU 110 115 109 98   [facility]  Diabetic diet  Status post fall  CT head, CT c-spine, and XR R knee negative for acute abnormality on admission  PT/OT saw patient again on 10/15 as patient was seen concerns regarding ability to climb 8 steps into the home.  PT eventually recommended outpatient therapy  Patient declined home health services  PVD (peripheral vascular disease) ([facility])  Continue statin  Hypertension  Blood pressure was elevated  Increased to nifedipine 60 mg p.o. 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, on chronic dialysis ([facility])  Lab Results   Component Value Date    EGFR 7 10/22/2024    EGFR 8 10/21/2024    EGFR 9 10/21/2024    CREATININE 7.35 (H) 10/22/2024    CREATININE 6.34 (H) 10/21/2024    CREATININE 5.71  (H) 10/21/2024   Cntinue TTS schedule for dialysis  Hyponatremia  Sodium chronically low  Nephrology cleared for discharge    Nausea  Continue PPI    Toxic metabolic encephalopathy  RR called 10/8 for acute change in mental status with last well known 12:58, stroke alert called 1:16 pm  NIH 8 and decision was made to give TNK after initial CT head negative for bleed  CTA head/neck showed intracranial ICA atherosclerosis - outpatient vascular surgery referral  MRI brain without evidence of acute CVA  Suspect metabolic encephalopathy in setting of ESRD with missed dialysis  Patient back at baseline mentation      Acute on chronic anemia  Hemoglobin 10.2 on admission, noted to drop to 6.7 after TNK administration  Patient initially declined CT a/p and blood transfusion, eventually agreeable to 1 unit PRBC on 10/10  Urology following for hematuria, urine now clear  Denies black/bloody bowel movements  Aspirin and DVT prophylaxis currently on hold  Fortunately hemoglobin had been stable, no further evidence of bleeding.  Recommend to continue aspirin on discharge    Discharging Physician / Practitioner: Mir Salinas MD  PCP: No primary care provider on file.  Admission Date:   Admission Orders (From admission, onward)       Ordered        10/09/24 1342  INPATIENT ADMISSION  Once            10/08/24 0518  Place in Observation  Once                          Discharge Date: 10/22/24    Medical Problems       Resolved Problems  Date Reviewed: 10/21/2024            Resolved    * (Principal) Acute on chronic anemia 10/18/2024     Resolved by  Nirav Bruce MD    Hyperkalemia 10/14/2024     Resolved by  Joselyn Reyes Bahamonde, MD    Toxic metabolic encephalopathy 10/17/2024     Resolved by  Nirav Bruce MD    Hematuria 10/18/2024     Resolved by  Nirav Bruce MD    Acute cystitis 10/21/2024     Resolved by  Nirav Bruce MD          Consultations During Hospital Stay:  Neurology  Critical  Care  Nephrology  Psychiatry    Procedures Performed:   2D Echo    Interpretation Summary  Show Result Comparison     Left Ventricle: Left ventricular cavity size is normal. Wall thickness is normal. There is borderline concentric hypertrophy. The left ventricular ejection fraction is 60 to 65 % by visual estimation. Systolic function is normal. Wall motion is normal. Diastolic function is mildly abnormal, consistent with grade I (abnormal) relaxation.    Left Atrium: The atrium is severely dilated ( 54 mL/m2).    Atrial Septum: There is no atrial septal defect by saline contrast. No patent foramen ovale detected, confirmed at rest using agitated saline contrast. 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  It has free respirophasic mobility between the right and left atrium.    Aortic Valve: There is mild regurgitation. There is aortic valve sclerosis.    Mitral Valve: There is mild annular calcification. There is mild regurgitation.    Tricuspid Valve: There is mild regurgitation.  Calculated pulmonary artery pressure around 30 mmHg.    Prior TTE study available for comparison. Prior study date: 8/25/2023.  No significant change.    Significant Findings / Test Results:   MRI brain wo contrast    Result Date: 10/9/2024  Impression: No acute ischemia. Minor periventricular white matter change consistent with chronic microangiopathy. Workstation performed: [id number]     CT stroke alert brain    Result Date: 10/8/2024  Impression: -No acute intracranial hemorrhage, mass effect or midline shift. Findings were directly discussed with Nicole Middleton at 1:57 pm. on 10/8/2024. Workstation performed: [id number]     CTA stroke alert (head/neck)    Result Date: 10/8/2024  Impression: -No large vessel occlusion, high-grade stenosis or dissection. -Intracranial ICA atherosclerosis resulting in narrowing most prominent involving the proximal right supraclinoid segment (moderate to advanced). Findings were directly discussed with  Heeral Middleton at 1:57 pm. on 10/8/2024. Workstation performed: [id number]     XR knee 4+ vw right injury    Result Date: 10/8/2024  Impression: No acute osseous abnormality. Severe tricompartmental osteoarthritis. Moderate joint effusion. Computerized Assisted Algorithm (CAA) may have been used to analyze all applicable images. Workstation performed: [id number]     CT spine cervical without contrast    Result Date: 10/8/2024  Impression: No cervical spine fracture or traumatic malalignment. Workstation performed: [id number]     CT head without contrast    Result Date: 10/8/2024  Impression: No acute intracranial abnormality. Workstation performed: [id number]        Incidental Findings:   none     Test Results Pending at Discharge (will require follow up):   none     Outpatient Tests Requested:  none    Complications:  none    Reason for Admission: Falls    Hospital Course:     Naresh Carpio is a 64 y.o. male patient who originally presented to the hospital on 10/8/2024 due to fall.  Initially presented with falls, missed dialysis.  Was recommended imaging but declined and wanted to leave A.  He ultimately was agreeable to stay for admission.  Initial x-ray of knees showing severe osteoarthritis, CT spine and CT head were negative for any acute fractures or processes.  He did have change in mental status and was a stroke alert.  Neurology recommended TNK and brain MRI.  He was transferred to ICU and monitored over 24 hours.  MRI brain was negative.  Suspect he had episode of toxic metabolic encephalopathy due to missed dialysis and noncompliance.  He did have a drop in hemoglobin and hematuria likely due to recent TNK administration.  Required 1 unit PRBC.  Hemoglobin fortunately was stable.  Recommend to continue aspirin 81 mg daily for stroke prophylaxis.  His monitor over several days, initially recommended rehab and ultimately improved recommending outpatient therapy.  His sodium levels were low, but patient was  "tolerating diet and nephrology did clear for discharge.  He has spent about 14 days hospitalized, has been stable over the last several days.  He was cleared for discharge home after dialysis session.  Patient declined home health care services on discharge.    Recommended patient were to have worsening symptoms of chest pain, shortness breath, fevers, chills, nausea/vomiting or concerning symptoms he may need to return for further evaluation.    Please see above list of diagnoses and related plan for additional information.     Condition at Discharge: fair     Discharge Day Visit / Exam:     Subjective:    Patient reports doing well overall today.  Has been able to tolerate diet.  Does endorse some nausea.  Some improvement since starting Protonix yesterday.  Discussed discharge plan for later this afternoon and agreeable.  Vitals: Blood Pressure: 162/59 (10/22/24 1204)  Pulse: 63 (10/22/24 1204)  Temperature: 98.1 °F (36.7 °C) (10/22/24 1204)  Temp Source: Oral (10/22/24 1204)  Respirations: 16 (10/22/24 1204)  Height: 6' 1\" (185.4 cm) (10/09/24 0739)  Weight - Scale: 97.7 kg (215 lb 6.2 oz) (10/22/24 0600)  SpO2: 99 % (10/22/24 1204)  Exam:   Physical Exam  Vitals and nursing note reviewed.   Constitutional:       Appearance: Normal appearance.   HENT:      Head: Normocephalic.   Eyes:      Conjunctiva/sclera: Conjunctivae normal.   Cardiovascular:      Rate and Rhythm: Normal rate.   Pulmonary:      Effort: Pulmonary effort is normal.      Breath sounds: No wheezing or rhonchi.   Abdominal:      General: Bowel sounds are normal. There is no distension.      Palpations: Abdomen is soft.   Musculoskeletal:         General: No swelling. Normal range of motion.   Skin:     General: Skin is warm and dry.   Neurological:      General: No focal deficit present.      Mental Status: He is alert. Mental status is at baseline.         Discharge instructions/Information to patient and family:   See after visit summary for " information provided to patient and family.      Provisions for Follow-Up Care:  See after visit summary for information related to follow-up care and any pertinent home health orders.      Disposition:     Home     Discharge Statement:  I spent 40 minutes discharging the patient. This time was spent on the day of discharge. I had direct contact with the patient on the day of discharge. Greater than 50% of the total time was spent examining patient, answering all patient questions, arranging and discussing plan of care with patient as well as directly providing post-discharge instructions.  Additional time then spent on discharge activities.    Discharge Medications:  See after visit summary for reconciled discharge medications provided to patient and family.      ** Please Note: This note has been constructed using a voice recognition system **

## 2024-10-22 NOTE — [place] UNIVERSAL TRANSFER FORM
"[place] UNIVERSAL TRANSFER FORM  (ALL ITEMS MUST BE COMPLETED)    1. TRANSFER FROM: [facility]      TRANSFER TO: ***    2. DATE OF TRANSFER: 10/22/2024                        TIME OF TRANSFER: ***    3. PATIENT NAME: Naresh Carpio D      YOB: 1960                             GENDER: male    4. LANGUAGE:   English    5. PHYSICIAN NAME:  Mir Salinas MD                   PHONE: [phone]    6. CODE STATUS: Level 1 - Full Code        Out of Hospital DNR Attached: {Yes/No:20651}    7. :                                      :  Extended Emergency Contact Information  Primary Emergency Contact: Alicja Carpio  Mobile Phone: [phone]  Relation: Sister  Secondary Emergency Contact: Mercedes Resendez  Mobile Phone: [phone]  Relation: Friend           Health Care Representative/Proxy:  {Yes/No:20651}           Legal Guardian:  {Yes/No:20651}             NAME OF:           HEALTH CARE REPRESENTATIVE/PROXY:                                         OR           LEGAL GUARDIAN, IF NOT :                                               PHONE:  (Day)           (Night)                        (Cell)    8. REASON FOR TRANSFER: (Must include brief medical history and recent changes in physical function or cognition.) ***            V/S: /59   Pulse 63   Temp 98.1 °F (36.7 °C) (Oral)   Resp 16   Ht 6' 1\" (1.854 m)   Wt 97.7 kg (215 lb 6.2 oz)   SpO2 99%   BMI 28.42 kg/m²           PAIN: {Pain:20652}    9. PRIMARY DIAGNOSIS: Acute on chronic anemia      Secondary Diagnosis:         Pacemaker: {Yes/No:20651}      Internal Defib: {Yes/No:20651}          Mental Health Diagnosis (if Applicable):    10. RESTRAINTS: {Restraints:20654}     11. RESPIRATORY NEEDS: {Respiratory Needs:20655}    12. ISOLATION/PRECAUTION: {Isolation Precautions:20657}    13. ALLERGY: Patient has no known allergies.    14. SENSORY:       " {Sensory:99478}    15. SKIN CONDITION: {Skin Cond:43776}    16. DIET: {Diet:20783}    17. IV ACCESS: {IV Access:20887}    18. PERSONAL ITEMS SENT WITH PATIENT: {Personal Items:20888}    19. ATTACHED DOCUMENTS: MUST ATTACH CURRENT MEDICATION INFORMATION {Attached Documents:20891}    20. AT RISK ALERTS:{Risks:20892}        HARM TO: {Harm To:20893}    21. WEIGHT BEARING STATUS:         Left Leg: {None/Limited/Full:81954}        Right Leg: {None/Limited/Full:20896}    22. MENTAL STATUS:{Mental Status:20897}    23. FUNCTION:        Walk: {Self/With Help/Not Able:20899}        Transfer: {Self/With Help/Not Able:20899}        Toilet: {Self/With Help/Not Able:20899}        Feed: {Self/With Help/Not Able:20899}    24. IMMUNIZATIONS/SCREENING:     Immunization History   Administered Date(s) Administered    COVID-19 J&J ([employer]) vaccine 0.5 mL 05/07/2021    Pneumococcal Polysaccharide PPV23 07/14/2022    Tdap 10/08/2024       25. BOWEL: {Bowel:20900}    26. BLADDER: {Bladder:20901}    27. SENDING FACILITY CONTACT: ***                  Title: ***        Unit: ***        Phone: ***          REC'G FACILITY CONTACT (if known):        Title:        Unit:         Phone:         FORM PREFILLED BY (if applicable)       Title:       Unit:        Phone:         FORM COMPLETED BY Rosa Em      Title: ***      Phone: ***

## 2024-10-22 NOTE — ASSESSMENT & PLAN NOTE
Outpatient unit [facility]  - Patient has significant issues with compliance for dialysis  - Access-PermCath with right IJ tunneled dialysis catheter  - Patient missed approximately 1 week of dialysis since being discharged from short-term rehab  - 10/21-no urgent indication for dialysis.  Sodium borderline low and we will repeat.  Reinforced free water restriction  - 10/22-dialysis.  Sodium level slightly improved 127.  Being dialyzed today and ultrafiltrating which will assist in free water clearance and raising sodium    Plan  - Dialysis TTS-dialysis today  - Social work involvement for dialysis transportation  - Trend phosphorus for now  - Restart sevelamer likely on discharge.

## 2024-10-23 NOTE — TELEPHONE ENCOUNTER
1ST ATTEMPT,     Called pt no answer, VM NOT SET UP YET.     Thank you,     Dolores       [place]- [place]- 10/22/2024    Naresh Carpio will need follow up in 8-10 weeks with general attending or advance practitioner. He will not require outpatient neurological testing.

## 2024-10-25 PROBLEM — N18.6 ESRD (END STAGE RENAL DISEASE) (HCC): Status: ACTIVE | Noted: 2024-10-25

## 2024-10-25 NOTE — ASSESSMENT & PLAN NOTE
[facility]  - Patient has significant issues with compliance for dialysis  - Access-PermCath with right IJ tunneled dialysis catheter  - Patient missed approximately 1 week of dialysis since being discharged from short-term rehab  - 10/21-no urgent indication for dialysis.  Sodium borderline low and we will repeat.  Reinforced free water restriction  - 10/22-dialysis.  Sodium level slightly improved 127.  Being dialyzed today and ultrafiltrating which will assist in free water clearance and raising sodium    Plan  - Dialysis TTS-dialysis today  - Social work involvement for dialysis transportation  - Trend phosphorus for now  - Restart sevelamer likely on discharge.    NOTE WAS ADDENDED 10/25. PT WAS SEEN ON 10/22. TO REFLECT ESRD ICD CODE

## 2024-10-25 NOTE — ASSESSMENT & PLAN NOTE
Outpatient unit [facility]  - Patient has significant issues with compliance for dialysis  - Access-PermCath with right IJ tunneled dialysis catheter  - Patient missed approximately 1 week of dialysis since being discharged from short-term rehab  - 10/21-no urgent indication for dialysis.  Sodium borderline low and we will repeat.  Reinforced free water restriction    Plan  - Dialysis next on Tuesday  - BMP this afternoon  - Reinforced free water restriction  - Will monitor inpatient 1 more day  - Social work involvement for dialysis transportation  - Trend phosphorus for now  - Restart sevelamer once GI symptoms improve    NOTE WAS ADDENDED ON 10/25. PT WAS SEEN ON 10/21. TO REFLECT ESRD ICD CODE

## 2024-11-06 ENCOUNTER — APPOINTMENT (OUTPATIENT)
Dept: DIALYSIS | Facility: HOSPITAL | Age: 64
DRG: 640 | End: 2024-11-06
Payer: MEDICARE

## 2024-11-06 ENCOUNTER — HOSPITAL ENCOUNTER (INPATIENT)
Facility: HOSPITAL | Age: 64
LOS: 2 days | Discharge: HOME | DRG: 640 | End: 2024-11-09
Attending: EMERGENCY MEDICINE | Admitting: INTERNAL MEDICINE
Payer: MEDICARE

## 2024-11-06 DIAGNOSIS — E87.5 HYPERKALEMIA: ICD-10-CM

## 2024-11-06 DIAGNOSIS — K92.1 MELENA: ICD-10-CM

## 2024-11-06 DIAGNOSIS — I48.0 PAROXYSMAL ATRIAL FIBRILLATION (HCC): ICD-10-CM
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
## 2024-11-06 DIAGNOSIS — N18.6 ESRD (END STAGE RENAL DISEASE) (HCC): ICD-10-CM

## 2024-11-06 DIAGNOSIS — R19.7 DIARRHEA: ICD-10-CM

## 2024-11-06 PROBLEM — E83.39 HYPERPHOSPHATEMIA: Status: ACTIVE | Noted: 2024-11-06

## 2024-11-06 PROBLEM — N18.9 CHRONIC KIDNEY DISEASE-MINERAL AND BONE DISORDER: Status: ACTIVE | Noted: 2024-10-14

## 2024-11-06 LAB
ALBUMIN SERPL BCG-MCNC: 3.9 G/DL (ref 3.5–5)
ALP SERPL-CCNC: 116 U/L (ref 34–104)
ALT SERPL W P-5'-P-CCNC: 70 U/L (ref 7–52)
ANION GAP SERPL CALCULATED.3IONS-SCNC: 18 MMOL/L (ref 4–13)
ANION GAP SERPL CALCULATED.3IONS-SCNC: 19 MMOL/L (ref 4–13)
AST SERPL W P-5'-P-CCNC: 37 U/L (ref 13–39)
BASOPHILS # BLD AUTO: 0.03 THOUSANDS/ÂΜL (ref 0–0.1)
BASOPHILS NFR BLD AUTO: 0 % (ref 0–1)
BILIRUB SERPL-MCNC: 0.41 MG/DL (ref 0.2–1)
BUN SERPL-MCNC: 127 MG/DL (ref 5–25)
BUN SERPL-MCNC: 87 MG/DL (ref 5–25)
CALCIUM SERPL-MCNC: 7.9 MG/DL (ref 8.4–10.2)
CALCIUM SERPL-MCNC: 8.3 MG/DL (ref 8.4–10.2)
CHLORIDE SERPL-SCNC: 94 MMOL/L (ref 96–108)
CHLORIDE SERPL-SCNC: 96 MMOL/L (ref 96–108)
CK SERPL-CCNC: 101 U/L (ref 39–308)
CO2 SERPL-SCNC: 18 MMOL/L (ref 21–32)
CO2 SERPL-SCNC: 21 MMOL/L (ref 21–32)
CREAT SERPL-MCNC: 6.97 MG/DL (ref 0.6–1.3)
CREAT SERPL-MCNC: 9.56 MG/DL (ref 0.6–1.3)
EOSINOPHIL # BLD AUTO: 0.03 THOUSAND/ÂΜL (ref 0–0.61)
EOSINOPHIL NFR BLD AUTO: 0 % (ref 0–6)
ERYTHROCYTE [DISTWIDTH] IN BLOOD BY AUTOMATED COUNT: 16.7 % (ref 11.6–15.1)
GFR SERPL CREATININE-BSD FRML MDRD: 5 ML/MIN/1.73SQ M
GFR SERPL CREATININE-BSD FRML MDRD: 7 ML/MIN/1.73SQ M
GLUCOSE SERPL-MCNC: 104 MG/DL (ref 65–140)
GLUCOSE SERPL-MCNC: 117 MG/DL (ref 65–140)
GLUCOSE SERPL-MCNC: 129 MG/DL (ref 65–140)
HCT VFR BLD AUTO: 27.3 % (ref 36.5–49.3)
HGB BLD-MCNC: 8.6 G/DL (ref 12–17)
IMM GRANULOCYTES # BLD AUTO: 0.04 THOUSAND/UL (ref 0–0.2)
IMM GRANULOCYTES NFR BLD AUTO: 1 % (ref 0–2)
LIPASE SERPL-CCNC: 31 U/L (ref 11–82)
LYMPHOCYTES # BLD AUTO: 0.89 THOUSANDS/ÂΜL (ref 0.6–4.47)
LYMPHOCYTES NFR BLD AUTO: 13 % (ref 14–44)
MAGNESIUM SERPL-MCNC: 2.5 MG/DL (ref 1.9–2.7)
MCH RBC QN AUTO: 30.2 PG (ref 26.8–34.3)
MCHC RBC AUTO-ENTMCNC: 31.5 G/DL (ref 31.4–37.4)
MCV RBC AUTO: 96 FL (ref 82–98)
MONOCYTES # BLD AUTO: 0.65 THOUSAND/ÂΜL (ref 0.17–1.22)
MONOCYTES NFR BLD AUTO: 10 % (ref 4–12)
NEUTROPHILS # BLD AUTO: 5.07 THOUSANDS/ÂΜL (ref 1.85–7.62)
NEUTS SEG NFR BLD AUTO: 76 % (ref 43–75)
NRBC BLD AUTO-RTO: 0 /100 WBCS
PLATELET # BLD AUTO: 192 THOUSANDS/UL (ref 149–390)
PMV BLD AUTO: 10.1 FL (ref 8.9–12.7)
POTASSIUM SERPL-SCNC: 4.5 MMOL/L (ref 3.5–5.3)
POTASSIUM SERPL-SCNC: 6.9 MMOL/L (ref 3.5–5.3)
PROT SERPL-MCNC: 7.2 G/DL (ref 6.4–8.4)
RBC # BLD AUTO: 2.85 MILLION/UL (ref 3.88–5.62)
SODIUM SERPL-SCNC: 132 MMOL/L (ref 135–147)
SODIUM SERPL-SCNC: 134 MMOL/L (ref 135–147)
WBC # BLD AUTO: 6.71 THOUSAND/UL (ref 4.31–10.16)

## 2024-11-06 PROCEDURE — 99285 EMERGENCY DEPT VISIT HI MDM: CPT

## 2024-11-06 PROCEDURE — 94664 DEMO&/EVAL PT USE INHALER: CPT

## 2024-11-06 PROCEDURE — 83690 ASSAY OF LIPASE: CPT | Performed by: EMERGENCY MEDICINE

## 2024-11-06 PROCEDURE — 83735 ASSAY OF MAGNESIUM: CPT | Performed by: NURSE PRACTITIONER

## 2024-11-06 PROCEDURE — 0241U HB NFCT DS VIR RESP RNA 4 TRGT: CPT | Performed by: NURSE PRACTITIONER

## 2024-11-06 PROCEDURE — 94640 AIRWAY INHALATION TREATMENT: CPT

## 2024-11-06 PROCEDURE — 85025 COMPLETE CBC W/AUTO DIFF WBC: CPT | Performed by: EMERGENCY MEDICINE

## 2024-11-06 PROCEDURE — 99215 OFFICE O/P EST HI 40 MIN: CPT | Performed by: INTERNAL MEDICINE

## 2024-11-06 PROCEDURE — 36415 COLL VENOUS BLD VENIPUNCTURE: CPT

## 2024-11-06 PROCEDURE — 93005 ELECTROCARDIOGRAM TRACING: CPT

## 2024-11-06 PROCEDURE — 82948 REAGENT STRIP/BLOOD GLUCOSE: CPT

## 2024-11-06 PROCEDURE — 87081 CULTURE SCREEN ONLY: CPT | Performed by: INTERNAL MEDICINE

## 2024-11-06 PROCEDURE — 94760 N-INVAS EAR/PLS OXIMETRY 1: CPT

## 2024-11-06 PROCEDURE — 99223 1ST HOSP IP/OBS HIGH 75: CPT | Performed by: INTERNAL MEDICINE

## 2024-11-06 PROCEDURE — 82550 ASSAY OF CK (CPK): CPT | Performed by: EMERGENCY MEDICINE

## 2024-11-06 PROCEDURE — 99291 CRITICAL CARE FIRST HOUR: CPT | Performed by: EMERGENCY MEDICINE

## 2024-11-06 PROCEDURE — 80053 COMPREHEN METABOLIC PANEL: CPT | Performed by: EMERGENCY MEDICINE

## 2024-11-06 PROCEDURE — 80048 BASIC METABOLIC PNL TOTAL CA: CPT | Performed by: NURSE PRACTITIONER

## 2024-11-06 PROCEDURE — 96374 THER/PROPH/DIAG INJ IV PUSH: CPT

## 2024-11-06 PROCEDURE — 5A1D70Z PERFORMANCE OF URINARY FILTRATION, INTERMITTENT, LESS THAN 6 HOURS PER DAY: ICD-10-PCS | Performed by: INTERNAL MEDICINE

## 2024-11-06 PROCEDURE — G0257 UNSCHED DIALYSIS ESRD PT HOS: HCPCS

## 2024-11-06 RX ORDER — ASPIRIN 81 MG/1
81 TABLET, CHEWABLE ORAL DAILY
Status: DISCONTINUED | OUTPATIENT
Start: 2024-11-07 | End: 2024-11-09 | Stop reason: HOSPADM

## 2024-11-06 RX ORDER — GABAPENTIN 100 MG/1
200 CAPSULE ORAL ONCE
Status: COMPLETED | OUTPATIENT
Start: 2024-11-06 | End: 2024-11-06

## 2024-11-06 RX ORDER — ALBUTEROL SULFATE 5 MG/ML
10 SOLUTION RESPIRATORY (INHALATION) ONCE
Status: COMPLETED | OUTPATIENT
Start: 2024-11-06 | End: 2024-11-06

## 2024-11-06 RX ORDER — HYDROXYZINE HYDROCHLORIDE 25 MG/1
25 TABLET, FILM COATED ORAL ONCE
Status: DISCONTINUED | OUTPATIENT
Start: 2024-11-06 | End: 2024-11-07

## 2024-11-06 RX ORDER — LOSARTAN POTASSIUM 100 MG/1
100 TABLET ORAL DAILY
COMMUNITY
End: 2024-11-06 | Stop reason: CLARIF

## 2024-11-06 RX ORDER — GABAPENTIN 100 MG/1
200 CAPSULE ORAL 2 TIMES DAILY
Status: DISCONTINUED | OUTPATIENT
Start: 2024-11-06 | End: 2024-11-09 | Stop reason: HOSPADM

## 2024-11-06 RX ORDER — HEPARIN SODIUM 5000 [USP'U]/ML
5000 INJECTION, SOLUTION INTRAVENOUS; SUBCUTANEOUS EVERY 8 HOURS SCHEDULED
Status: DISCONTINUED | OUTPATIENT
Start: 2024-11-06 | End: 2024-11-09 | Stop reason: HOSPADM

## 2024-11-06 RX ORDER — SEVELAMER HYDROCHLORIDE 800 MG/1
800 TABLET, FILM COATED ORAL
Status: DISCONTINUED | OUTPATIENT
Start: 2024-11-06 | End: 2024-11-09 | Stop reason: HOSPADM

## 2024-11-06 RX ORDER — METHOCARBAMOL 500 MG/1
500 TABLET, FILM COATED ORAL EVERY 6 HOURS PRN
Status: DISCONTINUED | OUTPATIENT
Start: 2024-11-06 | End: 2024-11-09 | Stop reason: HOSPADM

## 2024-11-06 RX ORDER — ALLOPURINOL 100 MG/1
100 TABLET ORAL DAILY
Status: DISCONTINUED | OUTPATIENT
Start: 2024-11-07 | End: 2024-11-09 | Stop reason: HOSPADM

## 2024-11-06 RX ORDER — ATORVASTATIN CALCIUM 80 MG/1
80 TABLET, FILM COATED ORAL
Status: DISCONTINUED | OUTPATIENT
Start: 2024-11-07 | End: 2024-11-09 | Stop reason: HOSPADM

## 2024-11-06 RX ORDER — SODIUM CHLORIDE FOR INHALATION 0.9 %
12 VIAL, NEBULIZER (ML) INHALATION ONCE
Status: COMPLETED | OUTPATIENT
Start: 2024-11-06 | End: 2024-11-06

## 2024-11-06 RX ORDER — HYDRALAZINE HYDROCHLORIDE 20 MG/ML
10 INJECTION INTRAMUSCULAR; INTRAVENOUS EVERY 4 HOURS PRN
Status: DISCONTINUED | OUTPATIENT
Start: 2024-11-06 | End: 2024-11-09 | Stop reason: HOSPADM

## 2024-11-06 RX ORDER — CALCIUM GLUCONATE 20 MG/ML
1 INJECTION, SOLUTION INTRAVENOUS ONCE
Status: COMPLETED | OUTPATIENT
Start: 2024-11-06 | End: 2024-11-06

## 2024-11-06 RX ORDER — ONDANSETRON 2 MG/ML
4 INJECTION INTRAMUSCULAR; INTRAVENOUS EVERY 4 HOURS PRN
Status: DISCONTINUED | OUTPATIENT
Start: 2024-11-06 | End: 2024-11-09 | Stop reason: HOSPADM

## 2024-11-06 RX ORDER — ACETAMINOPHEN 325 MG/1
650 TABLET ORAL EVERY 4 HOURS PRN
Status: DISCONTINUED | OUTPATIENT
Start: 2024-11-06 | End: 2024-11-09 | Stop reason: HOSPADM

## 2024-11-06 RX ORDER — NIFEDIPINE 30 MG/1
60 TABLET, EXTENDED RELEASE ORAL
Status: DISCONTINUED | OUTPATIENT
Start: 2024-11-06 | End: 2024-11-09 | Stop reason: HOSPADM

## 2024-11-06 RX ORDER — PANTOPRAZOLE SODIUM 40 MG/1
40 TABLET, DELAYED RELEASE ORAL
Status: DISCONTINUED | OUTPATIENT
Start: 2024-11-07 | End: 2024-11-09 | Stop reason: HOSPADM

## 2024-11-06 RX ORDER — DILTIAZEM HYDROCHLORIDE 5 MG/ML
15 INJECTION INTRAVENOUS ONCE
Status: COMPLETED | OUTPATIENT
Start: 2024-11-06 | End: 2024-11-06

## 2024-11-06 RX ADMIN — CALCIUM GLUCONATE 1 G: 20 INJECTION, SOLUTION INTRAVENOUS at 15:08

## 2024-11-06 RX ADMIN — DILTIAZEM HYDROCHLORIDE 15 MG: 5 INJECTION, SOLUTION INTRAVENOUS at 22:23

## 2024-11-06 RX ADMIN — ALBUTEROL SULFATE 10 MG: 2.5 SOLUTION RESPIRATORY (INHALATION) at 15:10

## 2024-11-06 RX ADMIN — ISODIUM CHLORIDE 12 ML: 0.03 SOLUTION RESPIRATORY (INHALATION) at 15:10

## 2024-11-06 RX ADMIN — GABAPENTIN 200 MG: 100 CAPSULE ORAL at 22:25

## 2024-11-06 RX ADMIN — SEVELAMER HYDROCHLORIDE 800 MG: 800 TABLET, FILM COATED ORAL at 17:57

## 2024-11-06 RX ADMIN — GABAPENTIN 200 MG: 100 CAPSULE ORAL at 14:40

## 2024-11-06 RX ADMIN — NIFEDIPINE 60 MG: 30 TABLET, EXTENDED RELEASE ORAL at 17:57

## 2024-11-06 RX ADMIN — SODIUM BICARBONATE 50 MEQ: 84 INJECTION INTRAVENOUS at 15:27

## 2024-11-06 RX ADMIN — HEPARIN SODIUM 5000 UNITS: 5000 INJECTION, SOLUTION INTRAVENOUS; SUBCUTANEOUS at 17:58

## 2024-11-06 NOTE — ED PROVIDER NOTES
Time reflects when diagnosis was documented in both MDM as applicable and the Disposition within this note       Time User Action Codes Description Comment    11/6/2024  3:16 PM Solomon Davis Add [W19.XXXA] Fall, initial encounter     11/6/2024  3:17 PM Solomon Davis Add [R19.7] Diarrhea     11/6/2024  3:17 PM Solomon Davis Add [E87.5] Hyperkalemia     11/6/2024  3:28 PM Freddy Carrasquillo Add [N18.6] ESRD (end stage renal disease) ([facility])           ED Disposition       ED Disposition   Admit    Condition   Stable    Date/Time   Wed Nov 6, 2024  3:16 PM    Comment   Case was discussed with SLIM and the patient's admission status was agreed to be Admission Status: observation status to the service of Dr. Carrasquillo .               Assessment & Plan       Medical Decision Making  Patient presenting with weakness after missed dialysis session, diarrhea.  Fell but did not strike head.  Denies headache, neck pain, and nontender on cervical palpation.  Cervical spine cleared clinically Via Nexus criteria.  I ordered and reviewed lab work including CBC, CMP, CK.  Patient hyperkalemic to 6.9.  Patient without any significant EKG changes.  Treated with heart neb, calcium, sodium bicarb.  I discussed patient with nephrology who agrees with plan for admission and dialysis.  I discussed patient with the hospitalist and admitted patient for further evaluation and management.    Amount and/or Complexity of Data Reviewed  External Data Reviewed: notes.     Details: Patient admitted from 10/8/2024 to 10/22/2024 after fall, missed dialysis.  Patient with change in mental status during his admission, received TNK, and ultimately did MRI which did not demonstrate CVA.  Labs: ordered.    Risk  Prescription drug management.  Decision regarding hospitalization.             Medications   ipratropium (ATROVENT) 0.02 % inhalation solution 1 mg (1 mg Nebulization Not Given 11/6/24 1510)   calcium gluconate 1 g in sodium chloride  0.9% 50 mL (premix) (1 g Intravenous New Bag 11/6/24 1508)   gabapentin (NEURONTIN) capsule 200 mg (200 mg Oral Given 11/6/24 1440)   albuterol inhalation solution 10 mg (10 mg Nebulization Given 11/6/24 1510)   sodium chloride 0.9 % inhalation solution 12 mL (12 mL Nebulization Given 11/6/24 1510)   sodium bicarbonate 8.4 % injection 50 mEq (50 mEq Intravenous Given 11/6/24 1527)       ED Risk Strat Scores                                               History of Present Illness       Chief Complaint   Patient presents with    Fall     Arrives BLS, reported that he has been having diarrhea, and went to bathroom, didn't quite make it and slipped on fecal matter and fell forward. No LOC, no hitting head. Was on the floor for 2 hrs, before calling 911. He is awake, alert. Co of back pain and on dialysis.        Past Medical History:   Diagnosis Date    Acute cystitis 10/18/2024    Acute on chronic anemia 01/23/2022    CKD     Diabetes mellitus (HCC)     Hematuria 10/10/2024    Hematuria 10/10/2024    Hyperkalemia 04/06/2024    Hyperlipidemia     Hypertension     Left toe amputee (HCC)     TIA (transient ischemic attack)     Toxic metabolic encephalopathy 10/08/2024      Past Surgical History:   Procedure Laterality Date    AMPUTATION Left     left foot resection 10 years ago dr tran    IR LOWER EXTREMITY ANGIOGRAM  08/29/2023    IR TEMPORARY DIALYSIS CATHETER PLACEMENT  08/25/2023    IR TUNNELED CENTRAL LINE REMOVAL  08/23/2023    IR TUNNELED DIALYSIS CATHETER PLACEMENT  01/27/2022    IR TUNNELED DIALYSIS CATHETER PLACEMENT  08/30/2023    DE AMPUTATION METATARSAL W/TOE SINGLE Right 8/31/2023    Procedure: RIGHT PARTIAL 1ST RAY RESECTION WITH  WOUND VAC APPLICATION;  Surgeon: Won Parmar DPM;  Location: [facility];  Service: Podiatry      History reviewed. No pertinent family history.   Social History     Tobacco Use    Smoking status: Never     Passive exposure: Never    Smokeless tobacco: Never   Vaping Use     Vaping status: Never Used   Substance Use Topics    Alcohol use: Not Currently     Alcohol/week: 0.0 standard drinks of alcohol     Comment: 0    Drug use: Never      E-Cigarette/Vaping    E-Cigarette Use Never User       E-Cigarette/Vaping Substances    Nicotine No     THC No     CBD No     Flavoring No     Other No     Unknown No       I have reviewed and agree with the history as documented.     Patient is a 64-year-old male presenting for evaluation of weakness, fall from standing, diarrhea.  Patient states over the course of the last few days he has had approximately 5 episodes per day of loose nonbloody occasionally dark stool.  Patient has had progressive weakness over this time.  Patient gets dialysis Tuesday Thursday Saturday and states that he missed the Tuesday session this week due to his illness.  Patient states earlier today he felt like he needed to have a bowel movement and quickly got out of bed and was walking with his walker when he had a bowel movement on the ground then slipped on it and was unable to get up.  Patient states he fell forward onto his hands and knees, did not strike his head, did not lose consciousness.  Patient denies headache, neck pain, has some component of chronic back pain which has not recently worsened.  Patient denies fevers, chills abdominal pain, nausea, vomiting, chest pain, shortness of breath.        Review of Systems   Constitutional:  Positive for fatigue. Negative for chills and fever.   Respiratory:  Negative for cough and shortness of breath.    Gastrointestinal:  Positive for diarrhea. Negative for abdominal pain, nausea and vomiting.   Musculoskeletal:  Negative for arthralgias and myalgias.   Neurological:  Positive for weakness (Generalized). Negative for headaches.   Psychiatric/Behavioral:  Negative for confusion.            Objective       ED Triage Vitals [11/06/24 1351]   Temperature Pulse Blood Pressure Respirations SpO2 Patient Position - Orthostatic  VS   98.3 °F (36.8 °C) 59 (!) 209/83 18 94 % Sitting      Temp Source Heart Rate Source BP Location FiO2 (%) Pain Score    Oral Monitor Left arm -- 6      Vitals      Date and Time Temp Pulse SpO2 Resp BP Pain Score FACES Pain Rating User   11/06/24 1511 -- -- 93 % -- -- -- -- CHANDAN   11/06/24 1500 -- 52 96 % -- 205/89 -- -- SF   11/06/24 1430 -- -- -- -- 190/78 -- -- SF   11/06/24 1415 -- 56 -- -- 165/68 -- -- SF   11/06/24 1351 98.3 °F (36.8 °C) 59 94 % 18 209/83 6 -- SF            Physical Exam  Vitals and nursing note reviewed.   Constitutional:       General: He is not in acute distress.     Appearance: Normal appearance. He is not ill-appearing, toxic-appearing or diaphoretic.      Comments: Well-appearing, nontoxic with nondistressed   HENT:      Head: Normocephalic and atraumatic.      Comments: Moist mucous membranes     Right Ear: External ear normal.      Left Ear: External ear normal.   Eyes:      General:         Right eye: No discharge.         Left eye: No discharge.   Cardiovascular:      Comments: Regular rate and rhythm, no murmurs rubs or gallops.  Extremities warm and well-perfused without mottling  Pulmonary:      Effort: No respiratory distress.      Comments: No increased work of breathing.  Speaking in complete sentences.  Lungs clear to auscultation bilaterally without wheezes, rales, rhonchi.  Satting 95% on room air indicating adequate oxygenation  Abdominal:      General: There is no distension.   Musculoskeletal:         General: No deformity.      Cervical back: Normal range of motion.      Comments: Trace bilateral lower extremity edema.  No midline C/T/L-spine tenderness, step-off, deformity.  Moving all extremities equally   Skin:     Findings: No lesion or rash.   Neurological:      Mental Status: He is alert and oriented to person, place, and time. Mental status is at baseline.      Comments: Awake, alert, pleasant, interactive   Psychiatric:         Mood and Affect: Mood and affect  normal.         Results Reviewed       Procedure Component Value Units Date/Time    Comprehensive metabolic panel [[id number]]  (Abnormal) Collected: 11/06/24 1404    Lab Status: Final result Specimen: Blood from Arm, Right Updated: 11/06/24 1458     Sodium 132 mmol/L      Potassium 6.9 mmol/L      Chloride 96 mmol/L      CO2 18 mmol/L      ANION GAP 18 mmol/L       mg/dL      Creatinine 9.56 mg/dL      Glucose 104 mg/dL      Calcium 7.9 mg/dL      AST 37 U/L      ALT 70 U/L      Alkaline Phosphatase 116 U/L      Total Protein 7.2 g/dL      Albumin 3.9 g/dL      Total Bilirubin 0.41 mg/dL      eGFR 5 ml/min/1.73sq m     Narrative:      National Kidney Disease Foundation guidelines for Chronic Kidney Disease (CKD):     Stage 1 with normal or high GFR (GFR > 90 mL/min/1.73 square meters)    Stage 2 Mild CKD (GFR = 60-89 mL/min/1.73 square meters)    Stage 3A Moderate CKD (GFR = 45-59 mL/min/1.73 square meters)    Stage 3B Moderate CKD (GFR = 30-44 mL/min/1.73 square meters)    Stage 4 Severe CKD (GFR = 15-29 mL/min/1.73 square meters)    Stage 5 End Stage CKD (GFR <15 mL/min/1.73 square meters)  Note: GFR calculation is accurate only with a steady state creatinine    Lipase [[id number]]  (Normal) Collected: 11/06/24 1404    Lab Status: Final result Specimen: Blood from Arm, Right Updated: 11/06/24 1453     Lipase 31 u/L     CK [[id number]]  (Normal) Collected: 11/06/24 1404    Lab Status: Final result Specimen: Blood from Arm, Right Updated: 11/06/24 1453     Total  U/L     CBC and differential [[id number]]  (Abnormal) Collected: 11/06/24 1404    Lab Status: Final result Specimen: Blood from Arm, Right Updated: 11/06/24 1410     WBC 6.71 Thousand/uL      RBC 2.85 Million/uL      Hemoglobin 8.6 g/dL      Hematocrit 27.3 %      MCV 96 fL      MCH 30.2 pg      MCHC 31.5 g/dL      RDW 16.7 %      MPV 10.1 fL      Platelets 192 Thousands/uL      nRBC 0 /100 WBCs      Segmented % 76 %      Immature Grans % 1 %       Lymphocytes % 13 %      Monocytes % 10 %      Eosinophils Relative 0 %      Basophils Relative 0 %      Absolute Neutrophils 5.07 Thousands/µL      Absolute Immature Grans 0.04 Thousand/uL      Absolute Lymphocytes 0.89 Thousands/µL      Absolute Monocytes 0.65 Thousand/µL      Eosinophils Absolute 0.03 Thousand/µL      Basophils Absolute 0.03 Thousands/µL             No orders to display       CriticalCare Time    Date/Time: 11/6/2024 3:31 PM    Performed by: Solomon Davis MD  Authorized by: Solomon Davis MD    Critical care provider statement:     Critical care time (minutes):  35    Critical care time was exclusive of:  Separately billable procedures and treating other patients and teaching time    Critical care was necessary to treat or prevent imminent or life-threatening deterioration of the following conditions:  Renal failure (hyperkalemia)    Critical care was time spent personally by me on the following activities:  Blood draw for specimens, interpretation of cardiac output measurements, obtaining history from patient or surrogate, ordering and review of laboratory studies, discussions with consultants, development of treatment plan with patient or surrogate, evaluation of patient's response to treatment, examination of patient, review of old charts, re-evaluation of patient's condition and ordering and review of radiographic studies  Comments:      Critical hyperkalemia treated with calcium gluconate, sodium bicarb, heart neb.  Discussed with nephrology with plan for urgent dialysis.      ED Medication and Procedure Management   Prior to Admission Medications   Prescriptions Last Dose Informant Patient Reported? Taking?   NIFEdipine (PROCARDIA XL) 30 mg 24 hr tablet 11/4/2024 at 1800  No No   Sig: Take 2 tablets (60 mg total) by mouth daily after dinner   allopurinol (ZYLOPRIM) 100 mg tablet 11/4/2024 at 0800 Self No No   Sig: Take 1 tablet (100 mg total) by mouth daily   apixaban (ELIQUIS)  5 mg   Yes No   Sig: Take 5 mg by mouth 2 (two) times a day   aspirin 81 mg chewable tablet 11/4/2024 at 0800  No No   Sig: Chew 1 tablet (81 mg total) daily   atorvastatin (LIPITOR) 80 mg tablet   Yes Yes   Sig: Take 80 mg by mouth daily   docusate sodium (COLACE) 100 mg capsule Unknown Self No No   Sig: Take 1 capsule (100 mg total) by mouth 2 (two) times a day   escitalopram (LEXAPRO) 10 mg tablet Not Taking  No No   Sig: Take 1 tablet (10 mg total) by mouth daily   Patient not taking: Reported on 11/6/2024   gabapentin (NEURONTIN) 100 mg capsule 11/4/2024 at 2200  No Yes   Sig: Take 2 capsules (200 mg total) by mouth 2 (two) times a day   losartan (COZAAR) 100 MG tablet   Yes No   Sig: Take 100 mg by mouth daily   naloxone (NARCAN) 4 mg/0.1 mL nasal spray Unknown Self No No   Sig: Administer 1 spray into a nostril. If no response after 2-3 minutes, give another dose in the other nostril using a new spray.   pantoprazole (PROTONIX) 40 mg tablet 11/4/2024 at 0800  No No   Sig: Take 1 tablet (40 mg total) by mouth daily in the early morning   polyethylene glycol (MIRALAX) 17 g packet Unknown Self No No   Sig: Take 17 g by mouth daily Do not start before September 7, 2023.   sevelamer (RENAGEL) 800 mg tablet 11/4/2024 at 1800 Self No No   Sig: Take 1 tablet (800 mg total) by mouth 3 (three) times a day with meals      Facility-Administered Medications: None     Patient's Medications   Discharge Prescriptions    No medications on file     No discharge procedures on file.  ED SEPSIS DOCUMENTATION   Time reflects when diagnosis was documented in both MDM as applicable and the Disposition within this note       Time User Action Codes Description Comment    11/6/2024  3:16 PM Solomon Davis [W19.XXXA] Fall, initial encounter     11/6/2024  3:17 PM Solomon Davis [R19.7] Diarrhea     11/6/2024  3:17 PM Solomon Davis [E87.5] Hyperkalemia     11/6/2024  3:28 PM Freddy Carrasquillo Add [N18.6] ESRD  (end stage renal disease) (HCC)                  Solomon Davis MD  11/06/24 8188

## 2024-11-06 NOTE — CONSULTS
Consultation - Nephrology   Name: Naresh Carpio 64 y.o. male I MRN: [id number]  Unit/Bed#: ED 01 I Date of Admission: 11/6/2024   Date of Service: 11/6/2024 I Hospital Day: 0   Inpatient consult to Nephrology  Consult performed by: Eliana Kingston PA-C  Consult ordered by: Freddy Carrasquillo DO        Physician Requesting Evaluation: Freddy Carrasquillo DO   Reason for Evaluation / Principal Problem: ESRD on HD    Assessment & Plan  ESRD (end stage renal disease) (HCC)  -on maintenance HD TTS@[facility]  -Access: R IJV PermCath  -EDW: 108 kg  -last treatment 11/2/2024 with missed HD x 1  -Plan for urgent 2-hour treatment due to hyperkalemia as outlined below  -next full treatment 11/7/2024.  Will plan to continue regular Tuesday, Thursday, Saturday schedule during hospitalization.    Hyperkalemia  -Etiology: Due to missed HD on ESRD  -K+ 6.9 on admission  -s/p medical management with calcium gluconate, albuterol, bicarb  -Plan for urgent 2-hour HD today with 1K bath for last hour of treatment  -Next full HD tomorrow  -Low potassium diet  -Avoid ACE, ARB, spironolactone.  Hold losartan  -Continue to monitor closely  Hypertension  -p/w hypertensive urgency  -BP hypertensive, suspect volume mediated due to missed HD  -volume status mild hypervolemia given HTN  -maintain goal BP <140/90  -home medications: Losartan 100 mg daily, nifedipine 60 mg daily  -hold losartan for now  -continue UF with dialysis as BP tolerates  -Avoid hypotension, maintain MAP >65  -continue to monitor    Anemia due to chronic kidney disease, on chronic dialysis (HCC)  -Hgb 8.6, below goal.  Goal Hgb >10.0  -was on Epogen 3000 units with HD during recent hospitalization.  Previously not on SURINDER with Hgb at goal  -holding SURINDER for now in the setting of hypertension  -trend CBC  -transfuse for Hgb <7.0  -management per primary team   Chronic kidney disease-mineral and bone disorder  -Hyperphosphatemia: as below  -Renal diet  -continue to monitor  "phosphorus, PTH, calcium, magnesium as outpatient    Hyperphosphatemia  -In the setting of ESRD  -Continue binders.  On Renagel 800 mg 3 times daily with meals  -Low phosphorus diet  -Continue to monitor  Fall  -p/w fall and weakness  -+hyperkalemia  -see management as above  -PT/OT  -management per primary team  Diabetes mellitus type 2 with peripheral artery disease (HCC)  Lab Results   Component Value Date    HGBA1C 5.5 10/08/2024       No results for input(s): \"POCGLU\" in the last 72 hours.    Blood Sugar Average: Last 72 hrs:  -stable  -continue to optimize glycemic control  -management per primary team      Atrial fibrillation (HCC)  -stable, rate controlled  -on eliquis  -management per Cardiology        HISTORY OF PRESENT ILLNESS:  Requesting Physician: Freddy Carrasquillo DO  Reason for Consult: ESRD on HD    Naresh Carpio is a 64 y.o. year old male with ESRD on HD TTS via R IJV PermCath at [facility], DM2, HTN, HLD, AF on Eliquis who was admitted to [facility] after presenting with fall.  Patient missed hemodialysis on Tuesday with last HD 11/2/2024.  Patient reports missing dialysis yesterday secondary to diarrhea, denies vomiting or abdominal pain.  Patient presented to ED today after experiencing a fall due to weakness of his legs.  Potassium 6.9 on admission and treated medically with calcium gluconate, albuterol, sodium bicarbonate.  Patient recently hospitalized  at [facility] 10/8 - 10/22/2024 after presenting with fall and hyperkalemia with missed HD x 1 week.  A renal consultation is requested today for assistance in the management of ESRD. Naresh Carpio is a known ESRD patient who undergoes maintenance hemodialysis at [facility] on Tuesday, Thursday, Saturday.    PAST MEDICAL HISTORY:  Past Medical History:   Diagnosis Date    Acute cystitis 10/18/2024    Acute on chronic anemia 01/23/2022    Atrial fibrillation (HCC)     Diabetes mellitus (HCC)     ESRD (end stage renal disease) on dialysis (HCC)  "    Hematuria 10/10/2024    Hematuria 10/10/2024    Hyperkalemia 04/06/2024    Hyperlipidemia     Hypertension     Left toe amputee (HCC)     TIA (transient ischemic attack)     Toxic metabolic encephalopathy 10/08/2024       PAST SURGICAL HISTORY:  Past Surgical History:   Procedure Laterality Date    AMPUTATION Left     left foot resection 10 years ago dr tran    IR LOWER EXTREMITY ANGIOGRAM  08/29/2023    IR TEMPORARY DIALYSIS CATHETER PLACEMENT  08/25/2023    IR TUNNELED CENTRAL LINE REMOVAL  08/23/2023    IR TUNNELED DIALYSIS CATHETER PLACEMENT  01/27/2022    IR TUNNELED DIALYSIS CATHETER PLACEMENT  08/30/2023    VT AMPUTATION METATARSAL W/TOE SINGLE Right 8/31/2023    Procedure: RIGHT PARTIAL 1ST RAY RESECTION WITH  WOUND VAC APPLICATION;  Surgeon: Won Parmar DPM;  Location: [facility];  Service: Podiatry       ALLERGIES:  No Known Allergies    SOCIAL HISTORY:  Social History     Substance and Sexual Activity   Alcohol Use Not Currently    Alcohol/week: 0.0 standard drinks of alcohol    Comment: 0     Social History     Substance and Sexual Activity   Drug Use Never     Social History     Tobacco Use   Smoking Status Never    Passive exposure: Never   Smokeless Tobacco Never       FAMILY HISTORY:  History reviewed. No pertinent family history.    MEDICATIONS:    Current Facility-Administered Medications:     ipratropium (ATROVENT) 0.02 % inhalation solution 1 mg, 1 mg, Nebulization, Once, Solomon Davis MD    Current Outpatient Medications:     atorvastatin (LIPITOR) 80 mg tablet, Take 80 mg by mouth daily, Disp: , Rfl:     gabapentin (NEURONTIN) 100 mg capsule, Take 2 capsules (200 mg total) by mouth 2 (two) times a day, Disp: 120 capsule, Rfl: 0    allopurinol (ZYLOPRIM) 100 mg tablet, Take 1 tablet (100 mg total) by mouth daily, Disp: 30 tablet, Rfl: 0    apixaban (ELIQUIS) 5 mg, Take 5 mg by mouth 2 (two) times a day, Disp: , Rfl:     aspirin 81 mg chewable tablet, Chew 1 tablet (81 mg total)  daily, Disp: 30 tablet, Rfl: 0    docusate sodium (COLACE) 100 mg capsule, Take 1 capsule (100 mg total) by mouth 2 (two) times a day, Disp: 60 capsule, Rfl: 0    escitalopram (LEXAPRO) 10 mg tablet, Take 1 tablet (10 mg total) by mouth daily (Patient not taking: Reported on 11/6/2024), Disp: 30 tablet, Rfl: 0    losartan (COZAAR) 100 MG tablet, Take 100 mg by mouth daily, Disp: , Rfl:     naloxone (NARCAN) 4 mg/0.1 mL nasal spray, Administer 1 spray into a nostril. If no response after 2-3 minutes, give another dose in the other nostril using a new spray., Disp: 1 each, Rfl: 0    NIFEdipine (PROCARDIA XL) 30 mg 24 hr tablet, Take 2 tablets (60 mg total) by mouth daily after dinner, Disp: 60 tablet, Rfl: 0    pantoprazole (PROTONIX) 40 mg tablet, Take 1 tablet (40 mg total) by mouth daily in the early morning, Disp: 30 tablet, Rfl: 0    polyethylene glycol (MIRALAX) 17 g packet, Take 17 g by mouth daily Do not start before September 7, 2023., Disp: , Rfl: 0    sevelamer (RENAGEL) 800 mg tablet, Take 1 tablet (800 mg total) by mouth 3 (three) times a day with meals, Disp: , Rfl: 0    REVIEW OF SYSTEMS:  A complete 10 point review of systems was performed and found to be negative unless otherwise noted below or in the HPI.  General: No fevers, chills.   Cardiovascular: No chest pain, shortness of breath, palpitations, leg edema.  Respiratory: No cough, sputum production, shortness of breath.  Gastrointestinal: No nausea, vomiting, abdominal pain, diarrhea.  Genitourinary: No hematuria.    PHYSICAL EXAM:  Current Weight: Weight - Scale: 108 kg (237 lb 10.5 oz)  First Weight: Weight - Scale: 108 kg (237 lb 10.5 oz)  Vitals:    11/06/24 1430 11/06/24 1500 11/06/24 1511 11/06/24 1530   BP: (!) 190/78 (!) 205/89  (!) 209/82   BP Location:       Pulse:  (!) 52  58   Resp:       Temp:       TempSrc:       SpO2:  96% 93% 100%   Weight:         No intake or output data in the 24 hours ending 11/06/24 1614  General:  Awake,  alert, appears comfortable and in no acute distress.   Skin:  No rash, warm, good skin turgor   Eyes:  PERRL, EOMI, sclerae nonicteric.  no periorbital edema   ENT:  Moist mucous membranes  Neck:  Trachea midline, symmetric.  No JVD.  No carotid bruits.  Chest:  Clear to auscultation bilaterally without wheezes, crackles or rhonchi.  Decreased breath sounds left base  CVS:  Regular rate and rhythm without murmur, gallop or rub.  S1 and S2 identified and normal.  No S3, S4.   Abdomen:  Soft, nontender, nondistended without masses.  Normal bowel sounds x 4 quadrants.  No bruit.  Extremities:  Warm, pink, motor and sensory intact and well perfused.  No cyanosis, pallor.  No BLE edema. +asterixis  Neuro:  Awake, alert, oriented x3.  Grossly intact. +BUE tremors  Psych:  Appropriate affect.  Mentating appropriately.  Normal mental status exam  Access: R IJV permcath in place with dressing intact, site clear       Invasive Devices: R IJV permcath     Lab Results:   Results from last 7 days   Lab Units 11/06/24  1404   WBC Thousand/uL 6.71   HEMOGLOBIN g/dL 8.6*   HEMATOCRIT % 27.3*   PLATELETS Thousands/uL 192   SODIUM mmol/L 132*   POTASSIUM mmol/L 6.9*   CHLORIDE mmol/L 96   CO2 mmol/L 18*   BUN mg/dL 127*   CREATININE mg/dL 9.56*   CALCIUM mg/dL 7.9*   ALK PHOS U/L 116*   ALT U/L 70*   AST U/L 37     Lab Results   Component Value Date    CALCIUM 7.9 (L) 11/06/2024    PHOS 6.8 (H) 10/22/2024       EMR, including Care Everywhere, [facility],  [facility] kaya and outpatient notes reviewed.  I have personally reviewed the blood work as stated above and in my note.  I have personally reviewed primary medical service and previous nephrology note.

## 2024-11-06 NOTE — ED NOTES
Pt reported that he cleaned himself up. RN cleaned his BL foot, some dry blood noted to his L toes. No lac noted.      Carmen Hernandez RN  11/06/24 2290

## 2024-11-06 NOTE — PLAN OF CARE
Problem: Potential for Falls  Goal: Patient will remain free of falls  Description: INTERVENTIONS:  - Educate patient/family on patient safety including physical limitations  - Instruct patient to call for assistance with activity   - Consult OT/PT to assist with strengthening/mobility   - Keep Call bell within reach  - Keep bed low and locked with side rails adjusted as appropriate  - Keep care items and personal belongings within reach  - Initiate and maintain comfort rounds  - Make Fall Risk Sign visible to staff  - Offer Toileting every 2 Hours, in advance of need  - Initiate/Maintain bed/seat alarm  - Obtain necessary fall risk management equipment:   - Apply yellow socks and bracelet for high fall risk patients  - Consider moving patient to room near nurses station  Outcome: Progressing

## 2024-11-06 NOTE — H&P
H&P - Hospitalist   Name: Naresh Carpio 64 y.o. male I MRN: [id number]  Unit/Bed#: ED 01 I Date of Admission: 11/6/2024   Date of Service: 11/6/2024 I Hospital Day: 0     Assessment & Plan  Hyperkalemia  History of ESRD on HD TTS paroxysmal atrial fibrillation hypertension diabetes with PAD who presents to the hospital for fall  The patient has been having diarrhea and slipped on fecal matter falling.  He was on the floor for 2 hours and came here to the hospital where he was found to have hypertension and hyperkalemia.  Seen by nephrology.  Planning urgent HD today and to resume TTS tomorrow.  Received calcium gluconate along with albuterol 10 mg solution  Anemia due to chronic kidney disease, on chronic dialysis (HCC)  Patient also reports melanotic stools.  Will check stool occult and consult gastroenterology.    Results from last 7 days   Lab Units 11/06/24  1404   HEMOGLOBIN g/dL 8.6*     Hypertension  Prior to admission on nifedipine.  Continue.  No longer on losartan.  Would have needed to be held anyway due to hyperkalemia.  Diabetes mellitus type 2 with peripheral artery disease (HCC)  Lab Results   Component Value Date    HGBA1C 5.5 10/08/2024     Results from last 7 days   Lab Units 11/06/24  1404   GLUCOSE RANDOM mg/dL 104     History of diabetes mellitus no longer on any agents.  Monitor with a.m. labs.  ESRD (end stage renal disease) (HCC)  ESRD on HD TTS missed dialysis on Tuesday.  Due to hyperkalemia going for extra treatment this evening urgently and to resume TTS schedule tomorrow  Atrial fibrillation (HCC)  Paroxysmal atrial fibrillation patient reports no longer on Eliquis.  Continue aspirin.  Fall  Fall with debility.  Lives alone.  Have PT/OT evaluate for discharge needs.  Melena      VTE Pharmacologic Prophylaxis: VTE Score: 3 Moderate Risk (Score 3-4) - Pharmacological DVT Prophylaxis Ordered: heparin.  Code Status: Level 1 - Full Code   Discussion with family:     Anticipated Length of  Stay: Patient will be admitted on an observation basis with an anticipated length of stay of less than 2 midnights secondary to upper kalemia.    Chief Complaint:     Fall (Arrives BLS, reported that he has been having diarrhea, and went to bathroom, didn't quite make it and slipped on fecal matter and fell forward. No LOC, no hitting head. Was on the floor for 2 hrs, before calling 911. He is awake, alert. Co of back pain and on dialysis. )    History of Present Illness  Naresh Carpio is a 64 y.o. male with a PMH of ESRD on HD TTS paroxysmal defibrillation diabetes mellitus with PAD who presents with this and fall.  The patient has had several episodes of dark diarrhea over the past several days.  He even missed dialysis on Tuesday due to his illness.  The patient today slipped on a bowel movement resulting in fall and could not get up.  Eventually he was able to summon EMS who brought him here to the hospital where he was found to have hyperkalemia.    Review of Systems   Constitutional:  Positive for fatigue. Negative for fever.   HENT:  Negative for facial swelling.    Eyes:  Negative for visual disturbance.   Respiratory:  Negative for shortness of breath.    Cardiovascular:  Negative for chest pain and palpitations.   Gastrointestinal:  Positive for diarrhea. Negative for abdominal distention, abdominal pain, nausea and vomiting.   Genitourinary:  Negative for urgency.   Musculoskeletal:  Negative for myalgias.   Skin:  Negative for rash.   Neurological:  Positive for weakness. Negative for seizures and speech difficulty.   Psychiatric/Behavioral:  The patient is not nervous/anxious.    All other systems reviewed and are negative.      Past Medical and Surgical History:   Past Medical History:   Diagnosis Date    Acute cystitis 10/18/2024    Acute on chronic anemia 01/23/2022    Atrial fibrillation (HCC)     Diabetes mellitus (HCC)     ESRD (end stage renal disease) on dialysis (HCC)     Hematuria 10/10/2024     Hematuria 10/10/2024    Hyperkalemia 04/06/2024    Hyperlipidemia     Hypertension     Left toe amputee (HCC)     TIA (transient ischemic attack)     Toxic metabolic encephalopathy 10/08/2024     Past Surgical History:   Procedure Laterality Date    AMPUTATION Left     left foot resection 10 years ago dr tran    IR LOWER EXTREMITY ANGIOGRAM  08/29/2023    IR TEMPORARY DIALYSIS CATHETER PLACEMENT  08/25/2023    IR TUNNELED CENTRAL LINE REMOVAL  08/23/2023    IR TUNNELED DIALYSIS CATHETER PLACEMENT  01/27/2022    IR TUNNELED DIALYSIS CATHETER PLACEMENT  08/30/2023    [place] AMPUTATION METATARSAL W/TOE SINGLE Right 8/31/2023    Procedure: RIGHT PARTIAL 1ST RAY RESECTION WITH  WOUND VAC APPLICATION;  Surgeon: Won Parmar DPM;  Location: [facility];  Service: Podiatry     Meds/Allergies:  Allergies: No Known Allergies  Prior to Admission Medications   Prescriptions Last Dose Informant Patient Reported? Taking?   NIFEdipine (PROCARDIA XL) 30 mg 24 hr tablet 11/4/2024 at 1800  No No   Sig: Take 2 tablets (60 mg total) by mouth daily after dinner   allopurinol (ZYLOPRIM) 100 mg tablet 11/4/2024 at 0800 Self No No   Sig: Take 1 tablet (100 mg total) by mouth daily   aspirin 81 mg chewable tablet 11/4/2024 at 0800  No No   Sig: Chew 1 tablet (81 mg total) daily   atorvastatin (LIPITOR) 80 mg tablet   Yes Yes   Sig: Take 80 mg by mouth daily   docusate sodium (COLACE) 100 mg capsule Unknown Self No No   Sig: Take 1 capsule (100 mg total) by mouth 2 (two) times a day   gabapentin (NEURONTIN) 100 mg capsule 11/4/2024 at 2200  No Yes   Sig: Take 2 capsules (200 mg total) by mouth 2 (two) times a day   naloxone (NARCAN) 4 mg/0.1 mL nasal spray Unknown Self No No   Sig: Administer 1 spray into a nostril. If no response after 2-3 minutes, give another dose in the other nostril using a new spray.   pantoprazole (PROTONIX) 40 mg tablet 11/4/2024 at 0800  No No   Sig: Take 1 tablet (40 mg total) by mouth daily in the early morning    polyethylene glycol (MIRALAX) 17 g packet Unknown Self No No   Sig: Take 17 g by mouth daily Do not start before September 7, 2023.   sevelamer (RENAGEL) 800 mg tablet 11/4/2024 at 1800 Self No No   Sig: Take 1 tablet (800 mg total) by mouth 3 (three) times a day with meals      Facility-Administered Medications: None     Social History:     Social History     Socioeconomic History    Marital status:      Spouse name: Not on file    Number of children: Not on file    Years of education: Not on file    Highest education level: Not on file   Occupational History    Not on file   Tobacco Use    Smoking status: Never     Passive exposure: Never    Smokeless tobacco: Never   Vaping Use    Vaping status: Never Used   Substance and Sexual Activity    Alcohol use: Not Currently     Alcohol/week: 0.0 standard drinks of alcohol     Comment: 0    Drug use: Never    Sexual activity: Not on file   Other Topics Concern    Not on file   Social History Narrative    Not on file     Social Determinants of Health     Financial Resource Strain: Not on file   Food Insecurity: No Food Insecurity (10/10/2024)    Nursing - Inadequate Food Risk Classification     Worried About Running Out of Food in the Last Year: Never true     Ran Out of Food in the Last Year: Never true     Ran Out of Food in the Last Year: Not on file   Transportation Needs: No Transportation Needs (10/10/2024)    PRAPARE - Transportation     Lack of Transportation (Medical): No     Lack of Transportation (Non-Medical): No   Physical Activity: Not on file   Stress: Not on file   Social Connections: Not on file   Intimate Partner Violence: Not on file   Housing Stability: High Risk (10/10/2024)    Housing Stability Vital Sign     Unable to Pay for Housing in the Last Year: No     Number of Times Moved in the Last Year: 2     Homeless in the Last Year: No     Patient Pre-hospital Living Situation: Lives alone  Patient Pre-hospital Level of Mobility:  walks  Patient Pre-hospital Diet Restrictions:     Objective   Vitals:   Blood Pressure: (!) 209/82 (11/06/24 1530)  Pulse: 58 (11/06/24 1530)  Temperature: 98.3 °F (36.8 °C) (11/06/24 1351)  Temp Source: Oral (11/06/24 1351)  Respirations: 18 (11/06/24 1351)  Weight - Scale: 108 kg (237 lb 10.5 oz) (11/06/24 1351)  SpO2: 100 % (11/06/24 1530)    Physical Exam  Vitals reviewed.   Constitutional:       General: He is not in acute distress.  HENT:      Head: Atraumatic.   Eyes:      General: No scleral icterus.     Extraocular Movements: Extraocular movements intact.      Conjunctiva/sclera: Conjunctivae normal.   Cardiovascular:      Rate and Rhythm: Regular rhythm.      Heart sounds: Normal heart sounds.   Pulmonary:      Effort: Pulmonary effort is normal.      Breath sounds: Decreased breath sounds present. No wheezing.   Abdominal:      General: Bowel sounds are normal.      Palpations: Abdomen is soft.      Tenderness: There is no abdominal tenderness. There is no rebound.   Musculoskeletal:         General: No swelling or tenderness.   Skin:     General: Skin is warm and dry.   Neurological:      General: No focal deficit present.      Mental Status: He is alert and oriented to person, place, and time.      Motor: No weakness.   Psychiatric:         Mood and Affect: Mood normal.         Additional Data:   Lab Results: I have reviewed the following results:  Results from last 7 days   Lab Units 11/06/24  1404   WBC Thousand/uL 6.71   HEMOGLOBIN g/dL 8.6*   HEMATOCRIT % 27.3*   PLATELETS Thousands/uL 192   SEGS PCT % 76*   LYMPHO PCT % 13*   MONO PCT % 10   EOS PCT % 0     Results from last 7 days   Lab Units 11/06/24  1404   SODIUM mmol/L 132*   POTASSIUM mmol/L 6.9*   CHLORIDE mmol/L 96   CO2 mmol/L 18*   ANION GAP mmol/L 18*   BUN mg/dL 127*   CREATININE mg/dL 9.56*   CALCIUM mg/dL 7.9*   ALBUMIN g/dL 3.9   TOTAL BILIRUBIN mg/dL 0.41   ALK PHOS U/L 116*   ALT U/L 70*   AST U/L 37   EGFR ml/min/1.73sq m 5    GLUCOSE RANDOM mg/dL 104         Results from last 7 days   Lab Units 11/06/24  1404   CK TOTAL U/L 101                                      Lines/Drains  Invasive Devices       Peripheral Intravenous Line  Duration             Peripheral IV 11/06/24 Distal;Right;Upper;Ventral (anterior) Arm <1 day              Hemodialysis Catheter  Duration             HD Permanent Double Catheter -- days                    Imaging:   Personally reviewed the following image studies in PACS and associated radiology reports:      EKG, Pathology, and Other Studies Reviewed on Admission:   EKG  Result Date: 11/06/24  Personally reviewed strips with impression of: Sinus bradycardia 53 bpm    Administrative Statements       ** Please Note: This note has been constructed using a voice recognition system. **

## 2024-11-06 NOTE — PLAN OF CARE
Post-Dialysis RN Treatment Note    Blood Pressure:  Pre:  163/83 mm/Hg  Post:  157/84   mmHg   EDW: 108.0 kg    Weight:  Pre:  109.0 kg   Post:  107.0  kg   Mode of weight measurement: Bed Scale   Volume Removed:   2000 ml    Treatment duration:     120 minutes    NS given:    No    Treatment shortened? No   Medications given during Rx:   None Reported   Estimated Kt/V:  Not Applicable   Access type:    Permacath/TDC   Access Issues:   No    Report called to primary nurse:   Yes,      Problem: METABOLIC, FLUID AND ELECTROLYTES - ADULT  Goal: Electrolytes maintained within normal limits  Description: INTERVENTIONS:  - Monitor labs and assess patient for signs and symptoms of electrolyte imbalances  - Administer electrolyte replacement as ordered  - Monitor response to electrolyte replacements, including repeat lab results as appropriate  - Instruct patient on fluid and nutrition as appropriate  Outcome: Progressing  Goal: Fluid balance maintained  Description: INTERVENTIONS:  - Monitor labs   - Monitor I/O and WT  - Instruct patient on fluid and nutrition as appropriate  - Assess for signs & symptoms of volume excess or deficit  Outcome: Progressing

## 2024-11-07 ENCOUNTER — APPOINTMENT (OUTPATIENT)
Dept: DIALYSIS | Facility: HOSPITAL | Age: 64
DRG: 640 | End: 2024-11-07
Payer: MEDICARE

## 2024-11-07 LAB
ANION GAP SERPL CALCULATED.3IONS-SCNC: 15 MMOL/L (ref 4–13)
ATRIAL RATE: 53 BPM
BUN SERPL-MCNC: 96 MG/DL (ref 5–25)
CALCIUM SERPL-MCNC: 8 MG/DL (ref 8.4–10.2)
CHLORIDE SERPL-SCNC: 96 MMOL/L (ref 96–108)
CO2 SERPL-SCNC: 23 MMOL/L (ref 21–32)
CREAT SERPL-MCNC: 7.45 MG/DL (ref 0.6–1.3)
ERYTHROCYTE [DISTWIDTH] IN BLOOD BY AUTOMATED COUNT: 16.4 % (ref 11.6–15.1)
FLUAV RNA RESP QL NAA+PROBE: NEGATIVE
FLUBV RNA RESP QL NAA+PROBE: NEGATIVE
GFR SERPL CREATININE-BSD FRML MDRD: 6 ML/MIN/1.73SQ M
GLUCOSE P FAST SERPL-MCNC: 88 MG/DL (ref 65–99)
GLUCOSE SERPL-MCNC: 88 MG/DL (ref 65–140)
HCT VFR BLD AUTO: 25.4 % (ref 36.5–49.3)
HGB BLD-MCNC: 8.2 G/DL (ref 12–17)
INR PPP: 1.12 (ref 0.85–1.19)
MCH RBC QN AUTO: 30 PG (ref 26.8–34.3)
MCHC RBC AUTO-ENTMCNC: 32.3 G/DL (ref 31.4–37.4)
MCV RBC AUTO: 93 FL (ref 82–98)
P AXIS: 36 DEGREES
PLATELET # BLD AUTO: 195 THOUSANDS/UL (ref 149–390)
PMV BLD AUTO: 10.5 FL (ref 8.9–12.7)
POTASSIUM SERPL-SCNC: 4.7 MMOL/L (ref 3.5–5.3)
PR INTERVAL: 186 MS
PROTHROMBIN TIME: 14.9 SECONDS (ref 12.3–15)
QRS AXIS: -7 DEGREES
QRS AXIS: 28 DEGREES
QRSD INTERVAL: 72 MS
QRSD INTERVAL: 94 MS
QT INTERVAL: 378 MS
QT INTERVAL: 536 MS
QTC INTERVAL: 504 MS
QTC INTERVAL: 528 MS
RBC # BLD AUTO: 2.73 MILLION/UL (ref 3.88–5.62)
RSV RNA RESP QL NAA+PROBE: NEGATIVE
SARS-COV-2 RNA RESP QL NAA+PROBE: NEGATIVE
SODIUM SERPL-SCNC: 134 MMOL/L (ref 135–147)
T WAVE AXIS: 127 DEGREES
T WAVE AXIS: 41 DEGREES
VENTRICULAR RATE: 117 BPM
VENTRICULAR RATE: 53 BPM
WBC # BLD AUTO: 6.23 THOUSAND/UL (ref 4.31–10.16)

## 2024-11-07 PROCEDURE — G0257 UNSCHED DIALYSIS ESRD PT HOS: HCPCS

## 2024-11-07 PROCEDURE — 85027 COMPLETE CBC AUTOMATED: CPT | Performed by: INTERNAL MEDICINE

## 2024-11-07 PROCEDURE — 80048 BASIC METABOLIC PNL TOTAL CA: CPT | Performed by: INTERNAL MEDICINE

## 2024-11-07 PROCEDURE — 99232 SBSQ HOSP IP/OBS MODERATE 35: CPT | Performed by: INTERNAL MEDICINE

## 2024-11-07 PROCEDURE — 5A1D70Z PERFORMANCE OF URINARY FILTRATION, INTERMITTENT, LESS THAN 6 HOURS PER DAY: ICD-10-PCS | Performed by: INTERNAL MEDICINE

## 2024-11-07 PROCEDURE — 90999 UNLISTED DIALYSIS PROCEDURE: CPT | Performed by: INTERNAL MEDICINE

## 2024-11-07 PROCEDURE — 97110 THERAPEUTIC EXERCISES: CPT

## 2024-11-07 PROCEDURE — 93010 ELECTROCARDIOGRAM REPORT: CPT | Performed by: INTERNAL MEDICINE

## 2024-11-07 PROCEDURE — 85610 PROTHROMBIN TIME: CPT | Performed by: INTERNAL MEDICINE

## 2024-11-07 PROCEDURE — 99223 1ST HOSP IP/OBS HIGH 75: CPT | Performed by: INTERNAL MEDICINE

## 2024-11-07 PROCEDURE — 97163 PT EVAL HIGH COMPLEX 45 MIN: CPT

## 2024-11-07 RX ADMIN — SEVELAMER HYDROCHLORIDE 800 MG: 800 TABLET, FILM COATED ORAL at 16:58

## 2024-11-07 RX ADMIN — SEVELAMER HYDROCHLORIDE 800 MG: 800 TABLET, FILM COATED ORAL at 08:05

## 2024-11-07 RX ADMIN — METHOCARBAMOL TABLETS 500 MG: 500 TABLET, COATED ORAL at 08:03

## 2024-11-07 RX ADMIN — SEVELAMER HYDROCHLORIDE 800 MG: 800 TABLET, FILM COATED ORAL at 13:06

## 2024-11-07 RX ADMIN — PANTOPRAZOLE SODIUM 40 MG: 40 TABLET, DELAYED RELEASE ORAL at 06:19

## 2024-11-07 RX ADMIN — GABAPENTIN 200 MG: 100 CAPSULE ORAL at 17:01

## 2024-11-07 RX ADMIN — HEPARIN SODIUM 5000 UNITS: 5000 INJECTION, SOLUTION INTRAVENOUS; SUBCUTANEOUS at 06:18

## 2024-11-07 RX ADMIN — ALLOPURINOL 100 MG: 100 TABLET ORAL at 08:05

## 2024-11-07 RX ADMIN — ASPIRIN 81 MG CHEWABLE TABLET 81 MG: 81 TABLET CHEWABLE at 08:05

## 2024-11-07 RX ADMIN — HEPARIN SODIUM 5000 UNITS: 5000 INJECTION, SOLUTION INTRAVENOUS; SUBCUTANEOUS at 21:14

## 2024-11-07 RX ADMIN — GABAPENTIN 200 MG: 100 CAPSULE ORAL at 08:05

## 2024-11-07 RX ADMIN — NIFEDIPINE 60 MG: 30 TABLET, EXTENDED RELEASE ORAL at 17:00

## 2024-11-07 RX ADMIN — HEPARIN SODIUM 5000 UNITS: 5000 INJECTION, SOLUTION INTRAVENOUS; SUBCUTANEOUS at 13:07

## 2024-11-07 RX ADMIN — ATORVASTATIN CALCIUM 80 MG: 80 TABLET, FILM COATED ORAL at 16:58

## 2024-11-07 NOTE — CASE MANAGEMENT
Case Management Assessment & Discharge Planning Note    Patient name Naresh Carpio  Location [address] [facility] [address] [facility] [address] MRN [id number]  : 1960 Date 2024       Current Admission Date: 2024  Current Admission Diagnosis:Hyperkalemia   Patient Active Problem List    Diagnosis Date Noted Date Diagnosed    Hyperphosphatemia 2024     Fall 2024     Melena 2024     Atrial fibrillation ([facility])      ESRD (end stage renal disease) ([facility]) 10/25/2024     Nausea 10/20/2024     Hyponatremia 10/19/2024     Chronic kidney disease-mineral and bone disorder 10/14/2024     Falls 10/08/2024     PVD (peripheral vascular disease) ([facility]) 10/08/2024     Closed fracture of right pelvis ([facility]) 2024     Bilateral sacral fracture, closed ([facility]) 2024     Hyperkalemia 2024     Difficulty with speech 2024     History of amputation of hallux ([facility]) 2024     Hypertensive urgency 2024     Facial cellulitis 2024     Constipation 2023     Bradycardia 2023     Cellulitis of right foot      Polymicrobial bacterial infection 2023     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 mellitus, with necrosis of bone ([facility])      Acquired deformity of foot, right      Charcot's joint      Subacute osteomyelitis of right foot ([facility])      Open wound of right foot 2023     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 mellitus, with bone involvement without evidence of necrosis ([facility])      Diabetic ulcer of left midfoot associated with type 2 diabetes mellitus, limited to breakdown of skin ([facility])      Diabetic polyneuropathy associated with type 2 diabetes mellitus ([facility])      Diabetes mellitus type 2 with peripheral artery disease ([facility])      History of amputation of lesser toe of left foot ([facility])      Onychomycosis      Status post fall 2023     Traumatic rhabdomyolysis ([facility]) 2023     Leukocytosis 2023     Osteoarthritis  of left hip 07/27/2022     Severe Left Orchalgia 07/26/2022     Right shoulder pain 07/26/2022     Left hip pain 07/06/2022     Hemodialysis status (HCC)      Hypervolemia      Anemia due to chronic kidney disease, on chronic dialysis (HCC) 01/21/2022     Type 2 diabetes mellitus, without long-term current use of insulin (HCC)      Hypertension      History of TIA (transient ischemic attack)      T10 vertebral fracture (HCC)        LOS (days): 0  Geometric Mean LOS (GMLOS) (days):   Days to GMLOS:     OBJECTIVE:  PATIENT READMITTED TO HOSPITAL      Current admission status: Inpatient     Preferred Pharmacy:   [id number] - [place], [place] - [address]  [facility] [address] [address]  Phone: [phone] Fax: [phone]    Primary Care Provider: Jeffrey Jackson    Primary Insurance: MEDICARE  Secondary Insurance:     ASSESSMENT:  Active Health Care Proxies    There are no active Health Care Proxies on file.    Readmission Root Cause  30 Day Readmission: Yes  During your hospital stay, did someone (provider, nurse, ) explain your care to you in a way you could understand?: Yes  Did you feel medically stable to leave the hospital?: Yes  Were you able to pay for your medication at the pharmacy?: Yes  Did you have reliable transportation to take you to your appointments?: Yes  During previous admission, was a post-acute recommendation made?: Yes  What post-acute resources were offered?: HHC, STR, Offered, but declined  Patient was readmitted due to: Hyperkalemia  Action Plan: Medical management    Patient Information  Admitted from:: Home  Assessment information provided by:: Patient  Primary Caregiver: Self  Support Systems: Self, Family members  County of Residence: [place]  What city do you live in?: [place], [place]  Home entry access options. Select all that apply.: Stairs  Number of steps to enter home.: One Flight  Type of Current Residence: Apartment  Floor Level:  1  Living Arrangements: Lives Alone    Activities of Daily Living Prior to Admission  Functional Status: Independent  Completes ADLs independently?: Yes  Ambulates independently?: Yes  Does patient currently own DME?: Yes  What DME does the patient currently own?: Walker, [employer]  Does the patient have a history of Short-Term Rehab?: Yes  Does patient have a history of HHC?: No  Does patient currently have HHC?: No     Patient Information Continued  Income Source: Unemployed  Does patient have prescription coverage?: Yes  Does patient receive dialysis treatments?: Yes ([facility]; TTS; Drives self)     Means of Transportation  Means of Transport to Appts:: Drives Self    DISCHARGE DETAILS:    Discharge planning discussed with:: Patient  Freedom of Choice: Yes  Comments - Freedom of Choice: Choice is to discharge home     Were Treatment Team discharge recommendations reviewed with patient/caregiver?: Yes  Did patient/caregiver verbalize understanding of patient care needs?: Yes  Were patient/caregiver advised of the risks associated with not following Treatment Team discharge recommendations?: Yes    Contacts  Patient Contacts: Alicja Carpio (sister)  Relationship to Patient:: Family  Contact Method: Phone  Phone Number: [phone]  Reason/Outcome: Emergency Contact    Requested Home Health Care         Is the patient interested in HHC at discharge?: No    DME Referral Provided  Referral made for DME?: No    Treatment Team Recommendation: Home  Discharge Destination Plan:: Home  Transport at Discharge : Wheelchair van

## 2024-11-07 NOTE — PROGRESS NOTES
Progress Note - Hospitalist   Name: Naresh Carpio 64 y.o. male I MRN: [id number]  Unit/Bed#: 4 [address] I Date of Admission: 11/6/2024   Date of Service: 11/7/2024 I Hospital Day: 0    Assessment & Plan  Hyperkalemia  History of ESRD on HD TTS paroxysmal atrial fibrillation hypertension diabetes with PAD who presents to the hospital for fall  The patient has been having diarrhea and slipped on fecal matter falling.  He was on the floor for 2 hours and came here to the hospital where he was found to have hypertension and hyperkalemia.  Seen by nephrology.  Underwent urgent HD on admission and resuming TTS .  Received calcium gluconate along with albuterol 10 mg solution    Results from last 7 days   Lab Units 11/07/24  0446 11/06/24 2157 11/06/24  1404   POTASSIUM mmol/L 4.7 4.5 6.9*     Anemia due to chronic kidney disease, on chronic dialysis (HCC)  Patient also reports melanotic stools.  Follow-up on stool occult and consulted gastroenterology.    Results from last 7 days   Lab Units 11/07/24  0446 11/06/24  1404   HEMOGLOBIN g/dL 8.2* 8.6*     Hypertension  Continue nifedipine.  No longer on losartan.    Diabetes mellitus type 2 with peripheral artery disease (HCC)  Lab Results   Component Value Date    HGBA1C 5.5 10/08/2024     Results from last 7 days   Lab Units 11/07/24  0446 11/06/24 2157 11/06/24  1404   GLUCOSE RANDOM mg/dL 88 117 104     History of diabetes mellitus no longer on any agents.  Stable with a.m. labs.  ESRD (end stage renal disease) (HCC)  ESRD on HD TTS missed dialysis on Tuesday.    Results from last 7 days   Lab Units 11/07/24  0446 11/06/24 2157 11/06/24  1404   BUN mg/dL 96* 87* 127*   CREATININE mg/dL 7.45* 6.97* 9.56*   EGFR ml/min/1.73sq m 6 7 5     Atrial fibrillation (HCC)  Paroxysmal atrial fibrillation patient reports no longer on Eliquis.  Continue aspirin.  Did have rapid atrial fibrillation last night short-lived.  Converted with 1 dose of IV diltiazem  Fall  Fall with  debility.  Lives alone.  Have PT/OT evaluate for discharge needs.  Hyperphosphatemia    Melena      VTE Pharmacologic Prophylaxis: VTE Score: 3 Moderate Risk (Score 3-4) - Pharmacological DVT Prophylaxis Ordered: heparin.    Mobility:   Basic Mobility Inpatient Raw Score: 12  JH-HLM Goal: 4: Move to chair/commode  JH-HLM Achieved: 3: Sit at edge of bed  JH-HLM Goal NOT achieved. Continue with multidisciplinary rounding and encourage appropriate mobility to improve upon JH-HLM goals.    Patient Centered Rounds: I have performed bedside rounds with nursing staff today.  Discussions with Specialists or Other Care Team Provider: Case management    Education and Discussions with Family / Patient: Updated  (sister) via phone.    Current Length of Stay: 0 day(s)  Current Patient Status: Observation   Certification Statement:   Discharge Plan: Anticipate discharge later today or tomorrow to discharge location to be determined pending rehab evaluations.    Code Status: Level 1 - Full Code    Subjective   Patient seen and examined.  No further diarrhea.  Rapid atrial fibrillation last night shortly after converted back to sinus rhythm with diltiazem IV x 1    Objective   Vitals:   Temp (24hrs), Av °F (35 °C), Min:73.4 °F (23 °C), Max:98.3 °F (36.8 °C)    Temp:  [73.4 °F (23 °C)-98.3 °F (36.8 °C)] 73.4 °F (23 °C)  HR:  [] 79  Resp:  [16-18] 18  BP: (119-209)/(54-91) 131/60  SpO2:  [93 %-100 %] 95 %  Body mass index is 30.86 kg/m².     Input and Output Summary (last 24 hours):     Intake/Output Summary (Last 24 hours) at 2024 0998  Last data filed at 2024 0622  Gross per 24 hour   Intake 750 ml   Output 2675 ml   Net -1925 ml       Physical Exam  Vitals reviewed.   Constitutional:       General: He is not in acute distress.  HENT:      Head: Atraumatic.   Cardiovascular:      Rate and Rhythm: Regular rhythm.      Heart sounds: Normal heart sounds.   Pulmonary:      Effort: Pulmonary effort is  normal.      Breath sounds: Decreased breath sounds present. No wheezing.   Abdominal:      General: Bowel sounds are normal.      Palpations: Abdomen is soft.      Tenderness: There is no abdominal tenderness.   Musculoskeletal:         General: No deformity.      Right lower leg: Edema present.      Left lower leg: Edema present.   Skin:     General: Skin is warm and dry.   Neurological:      General: No focal deficit present.      Mental Status: He is alert and oriented to person, place, and time.      Cranial Nerves: No cranial nerve deficit.   Psychiatric:         Mood and Affect: Mood normal.       Lines/Drains:  Invasive Devices       Peripheral Intravenous Line  Duration             Peripheral IV 11/06/24 Distal;Right;Upper;Ventral (anterior) Arm <1 day              Hemodialysis Catheter  Duration             HD Permanent Double Catheter -- days                      Telemetry:  Telemetry Orders (From admission, onward)               24 Hour Telemetry Monitoring  Continuous x 24 Hours (Telem)        Question:  Reason for 24 Hour Telemetry  Answer:  Metabolic/electrolyte disturbance with high probability of dysrhythmia. K level <3 or >6 OR KCL infusion >10mEq/hr                     Telemetry Reviewed: Atrial fibrillation. HR averaging    Indication for Continued Telemetry Use:                Lab Results: I have reviewed the following results:   Results from last 7 days   Lab Units 11/07/24  0446 11/06/24  1404   WBC Thousand/uL 6.23 6.71   HEMOGLOBIN g/dL 8.2* 8.6*   PLATELETS Thousands/uL 195 192   MCV fL 93 96   INR  1.12  --      Results from last 7 days   Lab Units 11/07/24  0446 11/06/24  2157 11/06/24  1404   SODIUM mmol/L 134* 134* 132*   POTASSIUM mmol/L 4.7 4.5 6.9*   CHLORIDE mmol/L 96 94* 96   CO2 mmol/L 23 21 18*   ANION GAP mmol/L 15* 19* 18*   BUN mg/dL 96* 87* 127*   CREATININE mg/dL 7.45* 6.97* 9.56*   CALCIUM mg/dL 8.0* 8.3* 7.9*   ALBUMIN g/dL  --   --  3.9   TOTAL BILIRUBIN mg/dL  --   --   0.41   ALK PHOS U/L  --   --  116*   ALT U/L  --   --  70*   AST U/L  --   --  37   EGFR ml/min/1.73sq m 6 7 5   GLUCOSE RANDOM mg/dL 88 117 104     Results from last 7 days   Lab Units 11/06/24  1404   MAGNESIUM mg/dL 2.5     Results from last 7 days   Lab Units 11/06/24  1404   CK TOTAL U/L 101                  Results from last 7 days   Lab Units 11/06/24  2147   POC GLUCOSE mg/dl 129               Recent Cultures (last 7 days):         Imaging:  Reviewed radiology reports from this admission including:  No results found.    Last 24 Hours Medication List:     Current Facility-Administered Medications:     acetaminophen (TYLENOL) tablet 650 mg, Q4H PRN    allopurinol (ZYLOPRIM) tablet 100 mg, Daily    aspirin chewable tablet 81 mg, Daily    atorvastatin (LIPITOR) tablet 80 mg, Daily With Dinner    gabapentin (NEURONTIN) capsule 200 mg, BID    heparin (porcine) subcutaneous injection 5,000 Units, Q8H KEMAL    hydrALAZINE (APRESOLINE) injection 10 mg, Q4H PRN    hydrOXYzine HCL (ATARAX) tablet 25 mg, Once    ipratropium (ATROVENT) 0.02 % inhalation solution 1 mg, Once    methocarbamol (ROBAXIN) tablet 500 mg, Q6H PRN    NIFEdipine (PROCARDIA XL) 24 hr tablet 60 mg, After Dinner    ondansetron (ZOFRAN) injection 4 mg, Q4H PRN    pantoprazole (PROTONIX) EC tablet 40 mg, Early Morning    sevelamer (RENAGEL) tablet 800 mg, TID With Meals    Administrative Statements   Today, Patient Was Seen By: Freddy Carrasquillo, DO  I have spent a total time of 35 minutes in caring for this patient on the day of the visit/encounter including Diagnostic results, Patient and family education, Counseling / Coordination of care, Documenting in the medical record, Obtaining or reviewing history  , and Communicating with other healthcare professionals .    **Please Note: This note may have been constructed using a voice recognition system.**

## 2024-11-07 NOTE — OCCUPATIONAL THERAPY NOTE
Occupational Therapy Cancellation     Patient Name: Naresh Carpio  Today's Date: 11/7/2024  Problem List  Principal Problem:    Hyperkalemia  Active Problems:    Anemia due to chronic kidney disease, on chronic dialysis (HCC)    Hypertension    Diabetes mellitus type 2 with peripheral artery disease (HCC)    Chronic kidney disease-mineral and bone disorder    ESRD (end stage renal disease) (HCC)    Atrial fibrillation (HCC)    Hyperphosphatemia    Fall    Melena    Past Medical History  Past Medical History:   Diagnosis Date    Acute cystitis 10/18/2024    Acute on chronic anemia 01/23/2022    Atrial fibrillation (HCC)     Diabetes mellitus (HCC)     ESRD (end stage renal disease) on dialysis (HCC)     Hematuria 10/10/2024    Hematuria 10/10/2024    Hyperkalemia 04/06/2024    Hyperlipidemia     Hypertension     Left toe amputee (HCC)     TIA (transient ischemic attack)     Toxic metabolic encephalopathy 10/08/2024     Past Surgical History  Past Surgical History:   Procedure Laterality Date    AMPUTATION Left     left foot resection 10 years ago dr tran    IR LOWER EXTREMITY ANGIOGRAM  08/29/2023    IR TEMPORARY DIALYSIS CATHETER PLACEMENT  08/25/2023    IR TUNNELED CENTRAL LINE REMOVAL  08/23/2023    IR TUNNELED DIALYSIS CATHETER PLACEMENT  01/27/2022    IR TUNNELED DIALYSIS CATHETER PLACEMENT  08/30/2023    TN AMPUTATION METATARSAL W/TOE SINGLE Right 8/31/2023    Procedure: RIGHT PARTIAL 1ST RAY RESECTION WITH  WOUND VAC APPLICATION;  Surgeon: Won Parmar DPM;  Location: [facility];  Service: Podiatry        11/07/24 0917   Note Type   Note type Cancelled Session  (Thursday 11/7/24)   Cancel Reasons Patient off floor/hemodialysis   Additional Comments OT orders received and chart review completed. Pt receiving HD at bedside. Will cancel and continue to follow as appropriate / schedule allows   Licensure   [place] License Number  Lori Bentley OTR/L [id number]     Lori Bentley  MOSES/DEVIN  [id number]  [id number]

## 2024-11-07 NOTE — PLAN OF CARE
Problem: Potential for Falls  Goal: Patient will remain free of falls  Description: INTERVENTIONS:  - Educate patient/family on patient safety including physical limitations  - Instruct patient to call for assistance with activity   - Consult OT/PT to assist with strengthening/mobility   - Keep Call bell within reach  - Keep bed low and locked with side rails adjusted as appropriate  - Keep care items and personal belongings within reach  - Initiate and maintain comfort rounds  - Make Fall Risk Sign visible to staff  - Offer Toileting every 2 Hours, in advance of need  - Initiate/Maintain bed alarm  - Obtain necessary fall risk management equipment: walker  - Apply yellow socks and bracelet for high fall risk patients  - Consider moving patient to room near nurses station  Outcome: Progressing     Problem: METABOLIC, FLUID AND ELECTROLYTES - ADULT  Goal: Electrolytes maintained within normal limits  Description: INTERVENTIONS:  - Monitor labs and assess patient for signs and symptoms of electrolyte imbalances  - Administer electrolyte replacement as ordered  - Monitor response to electrolyte replacements, including repeat lab results as appropriate  - Instruct patient on fluid and nutrition as appropriate  Outcome: Progressing  Goal: Fluid balance maintained  Description: INTERVENTIONS:  - Monitor labs   - Monitor I/O and WT  - Instruct patient on fluid and nutrition as appropriate  - Assess for signs & symptoms of volume excess or deficit  Outcome: Progressing     Problem: Prexisting or High Potential for Compromised Skin Integrity  Goal: Skin integrity is maintained or improved  Description: INTERVENTIONS:  - Identify patients at risk for skin breakdown  - Assess and monitor skin integrity  - Assess and monitor nutrition and hydration status  - Monitor labs   - Assess for incontinence   - Turn and reposition patient  - Assist with mobility/ambulation  - Relieve pressure over bony prominences  - Avoid friction  and shearing  - Provide appropriate hygiene as needed including keeping skin clean and dry  - Evaluate need for skin moisturizer/barrier cream  - Collaborate with interdisciplinary team   - Patient/family teaching  - Consider wound care consult   Outcome: Progressing

## 2024-11-07 NOTE — PLAN OF CARE
TX plan reviewed with Dr Mack and he is agreeable to the following: Goal is to UF 1-2 L on a 2 K+ bath for a morning serum K+ of 4.7, 210 min TX. Pt c/o left hand/finger cramping. Goal was decreased by 1 L. Pt is below EDW and states he had cramping post TX yesterday as well.    Problem: METABOLIC, FLUID AND ELECTROLYTES - ADULT  Goal: Electrolytes maintained within normal limits  Description: INTERVENTIONS:  - Monitor labs and assess patient for signs and symptoms of electrolyte imbalances  - Administer electrolyte replacement as ordered  - Monitor response to electrolyte replacements, including repeat lab results as appropriate  - Instruct patient on fluid and nutrition as appropriate  Outcome: Progressing  Goal: Fluid balance maintained  Description: INTERVENTIONS:  - Monitor labs   - Monitor I/O and WT  - Instruct patient on fluid and nutrition as appropriate  - Assess for signs & symptoms of volume excess or deficit  Outcome: Progressing     Post-Dialysis RN Treatment Note    Blood Pressure:  Pre 137/68 mm/Hg  Post 100/67 mmHg   EDW  108 kg   Weight:  Pre 106 kg   Post 105 kg   Mode of weight measurement: Bed Scale, pt states he feels unstable to stand   Volume Removed  1000 ml net    Treatment duration 210 minutes    NS given  No    Treatment shortened? No   Medications given during Rx None Reported   Estimated Kt/V  None Reported   Access type: Permacath/TDC   Access Issues: No    Report called to primary nurse   Yes, Krista Darnell RN

## 2024-11-07 NOTE — PROGRESS NOTES
NEPHROLOGY HOSPITAL PROGRESS NOTE   Naresh Carpio 64 y.o. male MRN: [id number]  Unit/Bed#: [address] [place] [address] Encounter: [id number]  Reason for Consult: ESRD on HD    Brief History of Admission -64-year-old gentleman with ESRD on HD, HTN, DM, HLP, CAD, TIA, atrial fibrillation who presented to [facility] on November 6, 2024 with a fall.  Noted to be hyperkalemic on admission requiring urgent dialysis.  Assessment & Plan  ESRD (end stage renal disease) ([facility])  [facility] TTS  Access: Right IJ PermCath   kg  Missed 1 HD tx prior to admission.  S/p urgent hemodialysis on admission, November 6, 2024, due to hyperkalemia.  Regular HD today.    Hyperkalemia  K 6.9 on admission.  Improved after medical management and urgent HD.  K is stable today at 4.7.   2K bath on HD.     Hypertension  BP high on admission.  BP better now.  Rx: Nifedipine 60 mg OD.   He reports not taking losartan at home.  1 kg UF on HD today.   Continue nifedipine as ordered.    Anemia due to chronic kidney disease, on chronic dialysis ([facility])  Hemoglobin below goal but stable.  Hemoglobin 8.2 today.  Currently not on SURINDER.    Chronic kidney disease-mineral and bone disorder  Continue sevelamer for hyperphosphatemia.  Continue renal diet.    Fall  Defer to primary team.    Melena  Defer to primary team.    PLAN SUMMARY:  HD today   1 kg UF on HD today.   Low K diet.     SUBJECTIVE / 24H INTERVAL HISTORY:  Reports leg weakness.   No CP or SOB.  Diarrhea improved.    HEMODIALYSIS PROCEDURE NOTE  The patient was seen and examined on hemodialysis.  Time: 3.5 hours  Sodium: 135 Blood flow: 400   Dialyzer: F160 Potassium: 2 Dialysate flow: 800   Access: R IJ permcath Bicarbonate: 35 Ultrafiltration goal: 1 kg   Medications on HD: none.      OBJECTIVE:  Current Weight: Weight - Scale: 106 kg (233 lb 14.5 oz)  Vitals:    11/07/24 0700 11/07/24 0730 11/07/24 0800 11/07/24 0830   BP: 138/58 120/56 119/63 124/54   BP Location:       Pulse:  78 80 78 78   Resp: 18 18 18 18   Temp:       TempSrc:       SpO2:       Weight:       Height:           Intake/Output Summary (Last 24 hours) at 11/7/2024 0844  Last data filed at 11/7/2024 0622  Gross per 24 hour   Intake 750 ml   Output 2675 ml   Net -1925 ml     General: conscious, cooperative, no distress  Skin: dry  Eyes: pink conjunctivae  ENT: moist mucous membranes  Respiratory: equal chest expansion, clear breath sounds.  Cardiovascular: distinct heart sounds, normal rate, regular rhythm, no rub  Abdomen: soft, non tender, non distended, normal bowel sounds  Extremities: trace to mild LE edema.   Genitourinary: no gaitan catheter.   Neuro: awake, alert.   Psych: appropriate affect    Medications:    Current Facility-Administered Medications:     acetaminophen (TYLENOL) tablet 650 mg, 650 mg, Oral, Q4H PRN, Freddy Carrasquillo,     allopurinol (ZYLOPRIM) tablet 100 mg, 100 mg, Oral, Daily, Freddy Carrasquillo, DO, 100 mg at 11/07/24 0805    aspirin chewable tablet 81 mg, 81 mg, Oral, Daily, Freddy Foreign, DO, 81 mg at 11/07/24 0805    atorvastatin (LIPITOR) tablet 80 mg, 80 mg, Oral, Daily With Dinner, Freddy Carrasquillo DO    gabapentin (NEURONTIN) capsule 200 mg, 200 mg, Oral, BID, Freddy Foreign, DO, 200 mg at 11/07/24 0805    heparin (porcine) subcutaneous injection 5,000 Units, 5,000 Units, Subcutaneous, Q8H KEMAL, Freddy Carrasquillo, , 5,000 Units at 11/07/24 0618    hydrALAZINE (APRESOLINE) injection 10 mg, 10 mg, Intravenous, Q4H PRN, Freddy Carrasquillo,     hydrOXYzine HCL (ATARAX) tablet 25 mg, 25 mg, Oral, Once, GRANT Rich    ipratropium (ATROVENT) 0.02 % inhalation solution 1 mg, 1 mg, Nebulization, Once, Solomon Davis MD    methocarbamol (ROBAXIN) tablet 500 mg, 500 mg, Oral, Q6H PRN, GRANT Rich, 500 mg at 11/07/24 0803    NIFEdipine (PROCARDIA XL) 24 hr tablet 60 mg, 60 mg, Oral, After Dinner, Freddy Carrasquillo DO, 60 mg at 11/06/24 1757    ondansetron (ZOFRAN) injection 4 mg, 4 mg, Intravenous,  "Q4H PRN, Freddy Carrasquillo DO    pantoprazole (PROTONIX) EC tablet 40 mg, 40 mg, Oral, Early Morning, Freddy DO Foreign, 40 mg at 11/07/24 0619    sevelamer (RENAGEL) tablet 800 mg, 800 mg, Oral, TID With Meals, Freddy DO Foreign, 800 mg at 11/07/24 0805    Laboratory Results:  Results from last 7 days   Lab Units 11/07/24  0446 11/06/24  2157 11/06/24  1404   WBC Thousand/uL 6.23  --  6.71   HEMOGLOBIN g/dL 8.2*  --  8.6*   HEMATOCRIT % 25.4*  --  27.3*   PLATELETS Thousands/uL 195  --  192   POTASSIUM mmol/L 4.7 4.5 6.9*   CHLORIDE mmol/L 96 94* 96   CO2 mmol/L 23 21 18*   BUN mg/dL 96* 87* 127*   CREATININE mg/dL 7.45* 6.97* 9.56*   CALCIUM mg/dL 8.0* 8.3* 7.9*   MAGNESIUM mg/dL  --   --  2.5     Portions of the record may have been created with voice recognition software. Occasional wrong word or \"sound a like\" substitutions may have occurred due to the inherent limitations of voice recognition software. Read the chart carefully and recognize, using context, where substitutions have occurred.If you have any questions, please contact the dictating provider.    "

## 2024-11-07 NOTE — PHYSICAL THERAPY NOTE
PHYSICAL THERAPY EVALUATION/TREATMENT     11/07/24 1255   PT Last Visit   PT Visit Date 11/07/24   Note Type   Note type Evaluation   Pain Assessment   Pain Assessment Tool 0-10   Pain Score 8  (L LE area, chronic as per patient)   Restrictions/Precautions   Other Precautions Chair Alarm;Bed Alarm;Fall Risk;Pain   Home Living   Type of Home Apartment   Home Layout One level;Stairs to enter with rails  (3 stairs to enter)   Home Equipment Walker   Additional Comments Patient states using roller walker prior to admission.  Patient able to avoid 8-9 stairs to enter by walking across the level yard to use only 3   Prior Function   Level of [place] Independent with ADLs;Independent with functional mobility;Independent with IADLS   Lives With Alone   IADLs Independent with driving;Independent with meal prep;Independent with medication management   Falls in the last 6 months 1 to 4   Comments Patient admitted with a fall   General   Additional Pertinent History Chart reviewed, patient admitted with hyperkalemia.  Patient with extended medical history of recent admission.  Patient on hemodialysis was ill at home missed session and then had a fall in his home all prior to admission   Family/Caregiver Present No   Cognition   Overall Cognitive Status WFL   Arousal/Participation Cooperative   Orientation Level Oriented X4   Following Commands Follows all commands and directions without difficulty   Subjective   Subjective Patient states that he would like to return home upon discharge   RLE Assessment   RLE Assessment   (Range of motion within functional limits, strength 4 -/5)   LLE Assessment   LLE Assessment   (Range of motion within functional limits, strength 4 -/5)   Coordination   Movements are Fluid and Coordinated 0   Coordination and Movement Description Decreased coordination with transfer and gait activity, improved with use of rolling walker   Bed Mobility   Additional Comments Patient sitting on the  bed edge unsupported in the lumbar area upon arrival by therapist   Transfers   Sit to Stand 5  Supervision   Stand to Sit 5  Supervision   Ambulation/Elevation   Gait Assistance 4  Minimal assist   Additional items Assist x 1;Verbal cues;Tactile cues   Assistive Device Rolling walker   Distance 20 feet with change in direction, forward flexed posturing and cueing to stay upright and within the confines of the walker   Balance   Static Sitting Fair +   Dynamic Sitting Fair   Static Standing Fair   Dynamic Standing Fair -   Ambulatory Fair -   Activity Tolerance   Activity Tolerance Patient tolerated treatment well;Patient limited by fatigue   Nurse Made Aware yes   Assessment   Prognosis Good   Problem List Decreased strength;Decreased range of motion;Decreased endurance;Impaired balance;Decreased mobility;Decreased coordination;Pain   Assessment Patient seen for Physical Therapy evaluation. Patient admitted with Hyperkalemia.  Comorbidities affecting patient's physical performance include: Hyperkalemia.  Personal factors affecting patient at time of initial evaluation include: ambulating with assistive device, stairs to enter home, inability to ambulate household distances, inability to navigate community distances, inability to navigate level surfaces without external assistance, inability to perform dynamic tasks in community, limited home support, positive fall history, inability to perform physical activity, inability to perform ADLS, and inability to perform IADLS . Prior to admission, patient was independent with functional mobility with walker, independent with ADLS, independent with IADLS, living in a multi-level home, ambulating household distance, and ambulating community distances.  Please find objective findings from Physical Therapy assessment regarding body systems outlined above with impairments and limitations including weakness, decreased ROM, impaired balance, decreased endurance, impaired  coordination, gait deviations, pain, decreased activity tolerance, decreased functional mobility tolerance, fall risk, and SOB upon exertion.  The Barthel Index was used as a functional outcome tool presenting with a score of Barthel Index Score: 65 today indicating marked limitations of functional mobility and ADLS.  Patient's clinical presentation is currently unstable/unpredictable as seen in patient's presentation of vital sign response, changing level of pain, increased fall risk, new onset of impairment of functional mobility, decreased endurance, and new onset of weakness. Pt would benefit from continued Physical Therapy treatment to address deficits as defined above and maximize level of functional mobility. As demonstrated by objective findings, the assigned level of complexity for this evaluation is high.The patient's AM-[facility] Basic Mobility Inpatient Short Form Raw Score is 20. A Raw score of greater than 16 suggests the patient may benefit from discharge to home. Please also refer to the recommendation of the Physical Therapist for safe discharge planning.   Goals   Patient Goals To get stronger feel better and go home   STG Expiration Date 11/14/24   Short Term Goal #1 Transfers and gait with rolling walker independently   Short Term Goal #2 Gait endurance to functional household distances   LTG Expiration Date 11/21/24   Long Term Goal #1 Strength bilateral lower extremities 4+/5   Long Term Goal #2 Independent transfers and gait with use of roller walker for functional community distances   Plan   Treatment/Interventions Functional transfer training;LE strengthening/ROM;Elevations;Therapeutic exercise;Endurance training;Patient/family training;Equipment eval/education;Gait training;Compensatory technique education   PT Frequency 3-5x/wk   Discharge Recommendation   Rehab Resource Intensity Level, PT III (Minimum Resource Intensity)   Additional Comments Patient resistant to outpatient therapy at this  time   AM-PAC Basic Mobility Inpatient   Turning in Flat Bed Without Bedrails 4   Lying on Back to Sitting on Edge of Flat Bed Without Bedrails 3   Moving Bed to Chair 3   Standing Up From Chair Using Arms 4   Walk in Room 3   Climb 3-5 Stairs With Railing 3   Basic Mobility Inpatient Raw Score 20   Basic Mobility Standardized Score 43.99   [facility] Highest Level Of Mobility   -HLM Goal 6: Walk 10 steps or more   JH-HLM Achieved 7: Walk 25 feet or more   Barthel Index   Feeding 10   Bathing 0   Grooming Score 5   Dressing Score 10   Bladder Score 10   Bowels Score 10   Toilet Use Score 5   Transfers (Bed/Chair) Score 10   Mobility (Level Surface) Score 0   Stairs Score 5   Barthel Index Score 65   Additional Treatment Session   Start Time 1240   End Time 1255   Treatment Assessment s: Patient with chronic left lower extremity pain O: Bilateral lower extremity exercise completed as listed below, gait training with roller walker for 30 feet with change in direction and supervision to min assist A: Patient cooperative tolerating functional household distance gait well and will benefit from assessment of stair climbing on next session   Exercises   Hip Flexion Sitting;10 reps;Bilateral  (Alternating)   Hip Abduction Sitting;10 reps;Bilateral  (Alternating)   Knee AROM Long Arc Quad Sitting;10 reps;Bilateral  (Alternating)   Marching Standing;10 reps;Bilateral  (Alternating high steps)   Balance training  Sit to and from stand without upper extremity support completed with raised bed x 8   Licensure   [place] License Number  Anat Lal, PT 4 0 [id number]

## 2024-11-07 NOTE — CONSULTS
Consultation -  [facility]  Naresh Carpio 64 y.o. male MRN: [id number]  Unit/Bed#: [address] [place] [address] Encounter: [id number]            Reason for Consult / Principal Problem:     melena      ASSESSMENT AND PLAN:    64-year-old male who reports dark stools.  Patient reports that this occurred for 2 days but now has resolved.   -Did discuss with patient the dark stools can indicate bleeding from GI tract although hemoglobin remains around baseline, he does not want to pursue any GI workup  -Would recommend to continue PPI daily  -Monitor hemoglobin  -Can consider stool studies if diarrhea recurs but currently states that this has resolved, may have had an acute gastroenteritis  -Anemia can be related to chronic disease but did recommend workup to evaluate for any GI source but as above has again refused any further GI evaluation  -Continue renal diet as tolerated    Spoke with hospitalist regarding case.  Thank you for the consultation.  Case will be discussed with Dr. Ritter.      ______________________________________________________________________    HPI:    64 y.o. male with a PMH of ESRD on HD TTS ,paroxysmal atrial fibrillation, diabetes mellitus with PAD who presents with fall.  The patient had several episodes of dark diarrhea over the past several days.  He even missed dialysis on Tuesday due to his illness.  The patient yesterday slipped on a bowel movement resulting in fall and could not get up.  Eventually he was able to summon EMS who brought him here to the hospital where he was found to have hyperkalemia with K of 6.9.  We were consulted regarding melena.  Hemoglobin was stable at baseline on admission and 8.6, today is 8.2.  He currently was started on diet. No reports of prior gi workup.  He states that he was having diarrhea and no other symptoms for 2 days and he was going about 4 times a day.  He states his stool looked dark and when EMS came he stated that they told him it  was black.  He otherwise states that he did not take Pepto-Bismol or any other medication that would make his stool black.  He denies any significant reflux symptoms.  Does take aspirin but has not taken that in a few days but denies any regular over-the-counter NSAID use.  He reports that his last bowel movement was yesterday morning and he tolerated diet without issue.  He states that he does not want to have EGD or colonoscopy.             REVIEW OF SYSTEMS:    CONSTITUTIONAL: Denies any fever, chills, rigors, and weight loss.  HEENT: No earache or tinnitus. Denies hearing loss or visual disturbances.  CARDIOVASCULAR: No chest pain or palpitations.   RESPIRATORY: Denies any cough, hemoptysis, shortness of breath or dyspnea on exertion.  GASTROINTESTINAL: As noted in the History of Present Illness.   GENITOURINARY: No problems with urination. Denies any hematuria or dysuria.  NEUROLOGIC: No dizziness or vertigo, denies headaches.   MUSCULOSKELETAL: Denies any muscle or joint pain.   SKIN: Denies skin rashes or itching.   ENDOCRINE: Denies excessive thirst. Denies intolerance to heat or cold.  PSYCHOSOCIAL: Denies depression or anxiety. Denies any recent memory loss.       Historical Information   Past Medical History:   Diagnosis Date    Acute cystitis 10/18/2024    Acute on chronic anemia 01/23/2022    Atrial fibrillation (HCC)     Diabetes mellitus (HCC)     ESRD (end stage renal disease) on dialysis (HCC)     Hematuria 10/10/2024    Hematuria 10/10/2024    Hyperkalemia 04/06/2024    Hyperlipidemia     Hypertension     Left toe amputee (HCC)     TIA (transient ischemic attack)     Toxic metabolic encephalopathy 10/08/2024     Past Surgical History:   Procedure Laterality Date    AMPUTATION Left     left foot resection 10 years ago dr tran    IR LOWER EXTREMITY ANGIOGRAM  08/29/2023    IR TEMPORARY DIALYSIS CATHETER PLACEMENT  08/25/2023    IR TUNNELED CENTRAL LINE REMOVAL  08/23/2023    IR TUNNELED DIALYSIS  CATHETER PLACEMENT  01/27/2022    IR TUNNELED DIALYSIS CATHETER PLACEMENT  08/30/2023    MT AMPUTATION METATARSAL W/TOE SINGLE Right 8/31/2023    Procedure: RIGHT PARTIAL 1ST RAY RESECTION WITH  WOUND VAC APPLICATION;  Surgeon: Won Parmar DPM;  Location: [facility];  Service: Podiatry     Social History   Social History     Substance and Sexual Activity   Alcohol Use Not Currently    Alcohol/week: 0.0 standard drinks of alcohol    Comment: 0     Social History     Substance and Sexual Activity   Drug Use Never     Social History     Tobacco Use   Smoking Status Never    Passive exposure: Never   Smokeless Tobacco Never     History reviewed. No pertinent family history.    Meds/Allergies       Medications Prior to Admission:     atorvastatin (LIPITOR) 80 mg tablet    gabapentin (NEURONTIN) 100 mg capsule    allopurinol (ZYLOPRIM) 100 mg tablet    aspirin 81 mg chewable tablet    docusate sodium (COLACE) 100 mg capsule    naloxone (NARCAN) 4 mg/0.1 mL nasal spray    NIFEdipine (PROCARDIA XL) 30 mg 24 hr tablet    pantoprazole (PROTONIX) 40 mg tablet    polyethylene glycol (MIRALAX) 17 g packet    sevelamer (RENAGEL) 800 mg tablet    Current Facility-Administered Medications:     acetaminophen (TYLENOL) tablet 650 mg, Q4H PRN    allopurinol (ZYLOPRIM) tablet 100 mg, Daily    aspirin chewable tablet 81 mg, Daily    atorvastatin (LIPITOR) tablet 80 mg, Daily With Dinner    gabapentin (NEURONTIN) capsule 200 mg, BID    heparin (porcine) subcutaneous injection 5,000 Units, Q8H KEMAL    hydrALAZINE (APRESOLINE) injection 10 mg, Q4H PRN    hydrOXYzine HCL (ATARAX) tablet 25 mg, Once    ipratropium (ATROVENT) 0.02 % inhalation solution 1 mg, Once    methocarbamol (ROBAXIN) tablet 500 mg, Q6H PRN    NIFEdipine (PROCARDIA XL) 24 hr tablet 60 mg, After Dinner    ondansetron (ZOFRAN) injection 4 mg, Q4H PRN    pantoprazole (PROTONIX) EC tablet 40 mg, Early Morning    sevelamer (RENAGEL) tablet 800 mg, TID With Meals    No Known  "Allergies        Objective     Blood pressure 131/60, pulse 79, temperature (!) 73.4 °F (23 °C), resp. rate 18, height 6' 1\" (1.854 m), weight 106 kg (233 lb 14.5 oz), SpO2 95%. Body mass index is 30.86 kg/m².      Intake/Output Summary (Last 24 hours) at 11/7/2024 0922  Last data filed at 11/7/2024 0622  Gross per 24 hour   Intake 750 ml   Output 2675 ml   Net -1925 ml         PHYSICAL EXAM:      General Appearance:   Alert, cooperative, no distress   HEENT:   Normocephalic, atraumatic, anicteric.     Neck:  Supple, symmetrical, trachea midline   Lungs:   Clear to auscultation bilaterally; no rales, rhonchi or wheezing; respirations unlabored    Heart::   Regular rate and rhythm; no murmur, rub, or gallop.   Abdomen:   Soft, non-tender, non-distended; normal bowel sounds; no r/r/g   Genitalia:   Deferred    Rectal:   Deferred    Extremities:  No cyanosis, clubbing or edema    Pulses:  2+ and symmetric all extremities    Skin:  No jaundice, rashes, or lesions    Lymph nodes:  No palpable cervical lymphadenopathy        Lab Results:   Admission on 11/06/2024   Component Date Value    WBC 11/06/2024 6.71     RBC 11/06/2024 2.85 (L)     Hemoglobin 11/06/2024 8.6 (L)     Hematocrit 11/06/2024 27.3 (L)     MCV 11/06/2024 96     MCH 11/06/2024 30.2     MCHC 11/06/2024 31.5     RDW 11/06/2024 16.7 (H)     MPV 11/06/2024 10.1     Platelets 11/06/2024 192     nRBC 11/06/2024 0     Segmented % 11/06/2024 76 (H)     Immature Grans % 11/06/2024 1     Lymphocytes % 11/06/2024 13 (L)     Monocytes % 11/06/2024 10     Eosinophils Relative 11/06/2024 0     Basophils Relative 11/06/2024 0     Absolute Neutrophils 11/06/2024 5.07     Absolute Immature Grans 11/06/2024 0.04     Absolute Lymphocytes 11/06/2024 0.89     Absolute Monocytes 11/06/2024 0.65     Eosinophils Absolute 11/06/2024 0.03     Basophils Absolute 11/06/2024 0.03     Sodium 11/06/2024 132 (L)     Potassium 11/06/2024 6.9 (HH)     Chloride 11/06/2024 96     CO2 " 11/06/2024 18 (L)     ANION GAP 11/06/2024 18 (H)     BUN 11/06/2024 127 (H)     Creatinine 11/06/2024 9.56 (H)     Glucose 11/06/2024 104     Calcium 11/06/2024 7.9 (L)     AST 11/06/2024 37     ALT 11/06/2024 70 (H)     Alkaline Phosphatase 11/06/2024 116 (H)     Total Protein 11/06/2024 7.2     Albumin 11/06/2024 3.9     Total Bilirubin 11/06/2024 0.41     eGFR 11/06/2024 5     Lipase 11/06/2024 31     Total CK 11/06/2024 101     Ventricular Rate 11/06/2024 53     Atrial Rate 11/06/2024 53     NC Interval 11/06/2024 186     QRSD Interval 11/06/2024 94     QT Interval 11/06/2024 536     QTC Interval 11/06/2024 504     P Axis 11/06/2024 36     QRS [place] 11/06/2024 -7     T Wave [place] 11/06/2024 41     POC Glucose 11/06/2024 129     Sodium 11/06/2024 134 (L)     Potassium 11/06/2024 4.5     Chloride 11/06/2024 94 (L)     CO2 11/06/2024 21     ANION GAP 11/06/2024 19 (H)     BUN 11/06/2024 87 (H)     Creatinine 11/06/2024 6.97 (H)     Glucose 11/06/2024 117     Calcium 11/06/2024 8.3 (L)     eGFR 11/06/2024 7     SARS-CoV-2 11/06/2024 Negative     INFLUENZA A PCR 11/06/2024 Negative     INFLUENZA B PCR 11/06/2024 Negative     RSV PCR 11/06/2024 Negative     Magnesium 11/06/2024 2.5     Sodium 11/07/2024 134 (L)     Potassium 11/07/2024 4.7     Chloride 11/07/2024 96     CO2 11/07/2024 23     ANION GAP 11/07/2024 15 (H)     BUN 11/07/2024 96 (H)     Creatinine 11/07/2024 7.45 (H)     Glucose 11/07/2024 88     Glucose, Fasting 11/07/2024 88     Calcium 11/07/2024 8.0 (L)     eGFR 11/07/2024 6     WBC 11/07/2024 6.23     RBC 11/07/2024 2.73 (L)     Hemoglobin 11/07/2024 8.2 (L)     Hematocrit 11/07/2024 25.4 (L)     MCV 11/07/2024 93     MCH 11/07/2024 30.0     MCHC 11/07/2024 32.3     RDW 11/07/2024 16.4 (H)     Platelets 11/07/2024 195     MPV 11/07/2024 10.5     Protime 11/07/2024 14.9     INR 11/07/2024 1.12        Imaging Studies: MRI brain wo contrast    Result Date: 10/9/2024  Narrative: MRI BRAIN WITHOUT CONTRAST  INDICATION: Stroke. COMPARISON:   8/22/2023 TECHNIQUE:  Multiplanar, multisequence imaging of the brain was performed. IMAGE QUALITY:  Diagnostic. FINDINGS: BRAIN PARENCHYMA:  There is no discrete mass, mass effect or midline shift. There is no intracranial hemorrhage.  There is no evidence of acute infarction and diffusion imaging is unremarkable. Minimal periatrial white matter hyperintensity on T2 and FLAIR imaging suggestive of minor chronic microangiopathy. There is an old lacunar infarct within the posterior aspect of the right lentiform nucleus. VENTRICLES:  Normal for the patient's age. SELLA AND PITUITARY GLAND:  Normal. ORBITS: Unremarkable. PARANASAL SINUSES: Mild mucosal thickening of the maxillary sinuses. VASCULATURE:  Evaluation of the major intracranial vasculature demonstrates appropriate flow voids. CALVARIUM AND SKULL BASE:  Normal. EXTRACRANIAL SOFT TISSUES:  Normal.     Impression: No acute ischemia. Minor periventricular white matter change consistent with chronic microangiopathy. Workstation performed: [id number]     Echo complete w/ contrast if indicated    Result Date: 10/9/2024  Narrative:   Left Ventricle: Left ventricular cavity size is normal. Wall thickness is normal. There is borderline concentric hypertrophy. The left ventricular ejection fraction is 60 to 65 % by visual estimation. Systolic function is normal. Wall motion is normal. Diastolic function is mildly abnormal, consistent with grade I (abnormal) relaxation.   Left Atrium: The atrium is severely dilated ( 54 mL/m2).   Atrial Septum: There is no atrial septal defect by saline contrast. No patent foramen ovale detected, confirmed at rest using agitated saline contrast. 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  It has free respirophasic mobility between the right and left atrium.   Aortic Valve: There is mild regurgitation. There is aortic valve sclerosis.   Mitral Valve: There is mild annular calcification. There is mild  regurgitation.   Tricuspid Valve: There is mild regurgitation.  Calculated pulmonary artery pressure around 30 mmHg.   Prior TTE study available for comparison. Prior study date: 8/25/2023.  No significant change.     CT stroke alert brain    Result Date: 10/8/2024  Narrative: CT BRAIN - STROKE ALERT PROTOCOL INDICATION:   Stroke Alert. COMPARISON:  Same day CT head and cervical spine, MRI of the brain from 8/22/2023 TECHNIQUE:  CT examination of the brain was performed.  In addition to axial images, coronal reformatted images were created and submitted for interpretation. Radiation dose length product (DLP) for this visit:  2083.55 mGy-cm .  This examination, like all CT scans performed in the [facility], was performed utilizing techniques to minimize radiation dose exposure, including the use of iterative reconstruction and automated exposure control. IMAGE QUALITY: Motion degraded examination requiring repeat scan FINDINGS: PARENCHYMA: Decreased attenuation is noted in periventricular and subcortical white matter demonstrating an appearance that is statistically most likely to represent mild microangiopathic change. No loss of gray-white differentiation.  No intracranial mass, mass effect or midline shift.  No acute parenchymal hemorrhage. Atherosclerotic calcifications of the carotid siphons and intradural vertebral arteries. VENTRICLES AND EXTRA-AXIAL SPACES:  Normal for the patient's age. VISUALIZED ORBITS: Normal visualized orbits. PARANASAL SINUSES: Normal visualized paranasal sinuses. CALVARIUM AND EXTRACRANIAL SOFT TISSUES: No acute calvarial abnormality. Redemonstrated and partially visualized right facial inflammatory stranding.     Impression: -No acute intracranial hemorrhage, mass effect or midline shift. Findings were directly discussed with Nicole Middleton at 1:57 pm. on 10/8/2024. Workstation performed: [id number]     CTA stroke alert (head/neck)    Result Date: 10/8/2024  Narrative:  CTA NECK AND BRAIN WITH AND WITHOUT CONTRAST INDICATION: Stroke Alert COMPARISON:   None. TECHNIQUE:  Post contrast imaging was performed after administration of iodinated contrast through the neck and brain. Post contrast axial 0.625 mm images timed to opacify the arterial system.  3D rendering was performed on an independent workstation.   MIP reconstructions performed. Coronal and sagittal reconstructions were performed of the non contrast portion of the brain. Radiation dose length product (DLP) for this visit:  558 mGy-cm .  This examination, like all CT scans performed in the [facility], was performed utilizing techniques to minimize radiation dose exposure, including the use of iterative reconstruction and automated exposure control. IV Contrast:  85 mL of iohexol (OMNIPAQUE) IMAGE QUALITY:   Diagnostic FINDINGS: CTA NECK ARCH AND GREAT VESSELS: Mild atherosclerotic changes with no severe stenosis. VERTEBRAL ARTERIES: Patent extracranial segments. Mild narrowing of bilateral vertebral origins. RIGHT CAROTID: Mild atherosclerotic calcifications at the carotid bifurcation. No stenosis.    No dissection. LEFT CAROTID: Mild atherosclerotic calcifications at the carotid bifurcation. No stenosis.    No dissection. NASCET criteria was used to determine the degree of internal carotid artery diameter stenosis. CTA BRAIN: INTERNAL CAROTID ARTERIES: Atherosclerotic calcifications of the intracranial ICAs. Moderate to advanced right proximal right supraclinoid stenosis, otherwise moderate stenosis of the cavernous and supraclinoid segments. ANTERIOR CEREBRAL ARTERY CIRCULATION:  No stenosis or occlusion. MIDDLE CEREBRAL ARTERY CIRCULATION:  No stenosis or occlusion. DISTAL VERTEBRAL ARTERIES: Scattered atherosclerotic calcifications bilateral V4 segments. No stenosis or occlusion. Normal PICA origins. BASILAR ARTERY:  No stenosis or occlusion. POSTERIOR CEREBRAL ARTERIES: No high-grade stenosis or  occlusion. Mild multifocal narrowing of bilateral P2 and P3 segments VENOUS STRUCTURES:  Normal. NON VASCULAR ANATOMY BONY STRUCTURES:  No acute osseous abnormality. Stable C7 superior endplate deformity with mild height loss. Degenerative changes of the spine most prominent at C5-C6. SOFT TISSUES OF THE NECK:  Normal. THORACIC INLET: Right jugular approach catheter with tip off the field-of-view. Collapse of the posterior aspect of the trachea, which may be due to tracheobronchomalacia.     Impression: -No large vessel occlusion, high-grade stenosis or dissection. -Intracranial ICA atherosclerosis resulting in narrowing most prominent involving the proximal right supraclinoid segment (moderate to advanced). Findings were directly discussed with Nicole Middleton at 1:57 pm. on 10/8/2024. Workstation performed: [id number]

## 2024-11-07 NOTE — QUICK NOTE
Seen patient at bedside for tremors. Patient reports tremors started prior to his fall at home. Reports both legs are weak. Denies CP, palpitation, headache, dizziness, nausea vomiting diarrhea fever chills.  Reports had  flu symptoms on Saturday this past week.      Patient awake alert oriented x 3, follows commands.  Tremors on upper extremities when lifted up, subtle tremor in lower extremities  on exam.  Altered speech due to tremor.   HR irregular irregular, tachycardic.  Telemetry showed A-fib with RVR, heart rate up to 130s.  No edema to lower extremity    Plan:  Check BMP stat.  EKG stat  Will give Cardizem 15 mg IV push  Check COVID flu RSV  Try Robaxin for tremors  Neuro checks

## 2024-11-07 NOTE — CMS CERTIFICATION NOTE
Treatment plan    Due to ambulatory dysfunction, melanotic stools and need to recheck labs in the morning will require greater than two midnights inpatient hospitalization stay.    Freddy Carrasquillo, DO FACP

## 2024-11-07 NOTE — PLAN OF CARE
Problem: Potential for Falls  Goal: Patient will remain free of falls  Description: INTERVENTIONS:  - Educate patient/family on patient safety including physical limitations  - Instruct patient to call for assistance with activity   - Consult OT/PT to assist with strengthening/mobility   - Keep Call bell within reach  - Keep bed low and locked with side rails adjusted as appropriate  - Keep care items and personal belongings within reach  - Initiate and maintain comfort rounds  - Make Fall Risk Sign visible to staff  - Offer Toileting every 2 Hours, in advance of need  - Initiate/Maintain bed alarm  Problem: METABOLIC, FLUID AND ELECTROLYTES - ADULT  Goal: Electrolytes maintained within normal limits  Description: INTERVENTIONS:  - Monitor labs and assess patient for signs and symptoms of electrolyte imbalances  - Administer electrolyte replacement as ordered  - Monitor response to electrolyte replacements, including repeat lab results as appropriate  - Instruct patient on fluid and nutrition as appropriate  Outcome: Progressing  Goal: Fluid balance maintained  Description: INTERVENTIONS:  - Monitor labs   - Monitor I/O and WT  - Instruct patient on fluid and nutrition as appropriate  - Assess for signs & symptoms of volume excess or deficit  Outcome: Progressing     Problem: Prexisting or High Potential for Compromised Skin Integrity  Goal: Skin integrity is maintained or improved  Description: INTERVENTIONS:  - Identify patients at risk for skin breakdown  - Assess and monitor skin integrity  - Assess and monitor nutrition and hydration status  - Monitor labs   - Assess for incontinence   - Turn and reposition patient  - Assist with mobility/ambulation  - Relieve pressure over bony prominences  - Avoid friction and shearing  - Provide appropriate hygiene as needed including keeping skin clean and dry  - Evaluate need for skin moisturizer/barrier cream  - Collaborate with interdisciplinary team   - Patient/family  teaching  - Consider wound care consult   Outcome: Progressing     - Apply yellow socks and bracelet for high fall risk patients  - Consider moving patient to room near nurses station  Outcome: Progressing

## 2024-11-08 LAB
ANION GAP SERPL CALCULATED.3IONS-SCNC: 12 MMOL/L (ref 4–13)
BACTERIA UR QL AUTO: ABNORMAL /HPF
BILIRUB UR QL STRIP: NEGATIVE
BUN SERPL-MCNC: 57 MG/DL (ref 5–25)
CALCIUM SERPL-MCNC: 7.7 MG/DL (ref 8.4–10.2)
CHLORIDE SERPL-SCNC: 95 MMOL/L (ref 96–108)
CLARITY UR: CLEAR
CO2 SERPL-SCNC: 25 MMOL/L (ref 21–32)
COLOR UR: YELLOW
CREAT SERPL-MCNC: 5.48 MG/DL (ref 0.6–1.3)
ERYTHROCYTE [DISTWIDTH] IN BLOOD BY AUTOMATED COUNT: 15.8 % (ref 11.6–15.1)
GFR SERPL CREATININE-BSD FRML MDRD: 10 ML/MIN/1.73SQ M
GLUCOSE SERPL-MCNC: 90 MG/DL (ref 65–140)
GLUCOSE UR STRIP-MCNC: ABNORMAL MG/DL
HCT VFR BLD AUTO: 25.3 % (ref 36.5–49.3)
HGB BLD-MCNC: 8.1 G/DL (ref 12–17)
HGB UR QL STRIP.AUTO: ABNORMAL
KETONES UR STRIP-MCNC: ABNORMAL MG/DL
LEUKOCYTE ESTERASE UR QL STRIP: ABNORMAL
MCH RBC QN AUTO: 30.1 PG (ref 26.8–34.3)
MCHC RBC AUTO-ENTMCNC: 32 G/DL (ref 31.4–37.4)
MCV RBC AUTO: 94 FL (ref 82–98)
MRSA NOSE QL CULT: ABNORMAL
MRSA NOSE QL CULT: ABNORMAL
NITRITE UR QL STRIP: NEGATIVE
NON-SQ EPI CELLS URNS QL MICRO: ABNORMAL /HPF
PH UR STRIP.AUTO: 7 [PH]
PLATELET # BLD AUTO: 193 THOUSANDS/UL (ref 149–390)
PMV BLD AUTO: 10.8 FL (ref 8.9–12.7)
POTASSIUM SERPL-SCNC: 4.5 MMOL/L (ref 3.5–5.3)
PROT UR STRIP-MCNC: ABNORMAL MG/DL
RBC # BLD AUTO: 2.69 MILLION/UL (ref 3.88–5.62)
RBC #/AREA URNS AUTO: ABNORMAL /HPF
SODIUM SERPL-SCNC: 132 MMOL/L (ref 135–147)
SP GR UR STRIP.AUTO: 1.02 (ref 1–1.03)
TRANS CELLS #/AREA URNS HPF: ABNORMAL /[HPF]
UROBILINOGEN UR STRIP-ACNC: <2 MG/DL
WBC # BLD AUTO: 6.28 THOUSAND/UL (ref 4.31–10.16)
WBC #/AREA URNS AUTO: ABNORMAL /HPF
WBC CLUMPS # UR AUTO: ABNORMAL /UL

## 2024-11-08 PROCEDURE — 81001 URINALYSIS AUTO W/SCOPE: CPT | Performed by: NURSE PRACTITIONER

## 2024-11-08 PROCEDURE — 99232 SBSQ HOSP IP/OBS MODERATE 35: CPT | Performed by: INTERNAL MEDICINE

## 2024-11-08 PROCEDURE — 97110 THERAPEUTIC EXERCISES: CPT

## 2024-11-08 PROCEDURE — 85027 COMPLETE CBC AUTOMATED: CPT | Performed by: INTERNAL MEDICINE

## 2024-11-08 PROCEDURE — 87077 CULTURE AEROBIC IDENTIFY: CPT | Performed by: NURSE PRACTITIONER

## 2024-11-08 PROCEDURE — 87147 CULTURE TYPE IMMUNOLOGIC: CPT | Performed by: NURSE PRACTITIONER

## 2024-11-08 PROCEDURE — 87086 URINE CULTURE/COLONY COUNT: CPT | Performed by: NURSE PRACTITIONER

## 2024-11-08 PROCEDURE — 80048 BASIC METABOLIC PNL TOTAL CA: CPT | Performed by: INTERNAL MEDICINE

## 2024-11-08 RX ADMIN — ACETAMINOPHEN 650 MG: 325 TABLET ORAL at 02:43

## 2024-11-08 RX ADMIN — HEPARIN SODIUM 5000 UNITS: 5000 INJECTION, SOLUTION INTRAVENOUS; SUBCUTANEOUS at 21:43

## 2024-11-08 RX ADMIN — NIFEDIPINE 60 MG: 30 TABLET, EXTENDED RELEASE ORAL at 17:21

## 2024-11-08 RX ADMIN — GABAPENTIN 200 MG: 100 CAPSULE ORAL at 17:21

## 2024-11-08 RX ADMIN — HEPARIN SODIUM 5000 UNITS: 5000 INJECTION, SOLUTION INTRAVENOUS; SUBCUTANEOUS at 06:32

## 2024-11-08 RX ADMIN — SEVELAMER HYDROCHLORIDE 800 MG: 800 TABLET, FILM COATED ORAL at 07:23

## 2024-11-08 RX ADMIN — HEPARIN SODIUM 5000 UNITS: 5000 INJECTION, SOLUTION INTRAVENOUS; SUBCUTANEOUS at 14:36

## 2024-11-08 RX ADMIN — SEVELAMER HYDROCHLORIDE 800 MG: 800 TABLET, FILM COATED ORAL at 12:08

## 2024-11-08 RX ADMIN — ATORVASTATIN CALCIUM 80 MG: 80 TABLET, FILM COATED ORAL at 17:21

## 2024-11-08 RX ADMIN — ALLOPURINOL 100 MG: 100 TABLET ORAL at 08:31

## 2024-11-08 RX ADMIN — GABAPENTIN 200 MG: 100 CAPSULE ORAL at 08:31

## 2024-11-08 RX ADMIN — SEVELAMER HYDROCHLORIDE 800 MG: 800 TABLET, FILM COATED ORAL at 17:21

## 2024-11-08 RX ADMIN — PANTOPRAZOLE SODIUM 40 MG: 40 TABLET, DELAYED RELEASE ORAL at 06:32

## 2024-11-08 RX ADMIN — METHOCARBAMOL TABLETS 500 MG: 500 TABLET, COATED ORAL at 02:43

## 2024-11-08 RX ADMIN — ASPIRIN 81 MG CHEWABLE TABLET 81 MG: 81 TABLET CHEWABLE at 08:31

## 2024-11-08 NOTE — PROGRESS NOTES
NEPHROLOGY HOSPITAL PROGRESS NOTE   Naresh Carpio 64 y.o. male MRN: [id number]  Unit/Bed#: [address] [place] [address] Encounter: [id number]  Reason for Consult: ESRD on HD    Brief History of Admission -64-year-old gentleman with ESRD on HD, HTN, DM, HLP, CAD, TIA, atrial fibrillation who presented to [facility] on November 6, 2024 with a fall.  Noted to be hyperkalemic on admission requiring urgent dialysis.  Assessment & Plan  ESRD (end stage renal disease) (HCC)  [facility]  Access: Right IJ PermCath   kg  Missed 1 HD tx prior to admission and admitted with hyperkalemia requiring urgent HD on 11/6/24.   S/p regular HD on 11/7/24 - Thurs.   Next HD is tomorrow.     Hyperkalemia  K 6.9 on admission.  Improved after medical management and urgent HD.  K stable at 4.5     Hypertension  BP high on admission   BP now at goal.   Rx: Nifedipine 60 mg OD.   He reports not taking losartan at home for the past few months.   No changes today. Avoid Losartan due to hyperkalemia.     Anemia due to chronic kidney disease, on chronic dialysis ([facility])  Hemoglobin below goal but stable.  8.1 today.   Currently not on SURINDER.    Chronic kidney disease-mineral and bone disorder  Continue sevelamer 800 mg TID for hyperphosphatemia.  Continue renal diet.  Check phos if not discharged.     Fall  Defer to primary team.    Melena  Defer to primary team.  GI consulted.     PLAN SUMMARY:  Regular HD tomorrow.   Continue same BP meds.   Stable for discharge from renal standpoint.    SUBJECTIVE / 24H INTERVAL HISTORY:  Tolerated HD yesterday.  Leg weakness is better.  No CP or SOB.    OBJECTIVE:  Current Weight: Weight - Scale: 106 kg (233 lb 14.5 oz)  Vitals:    11/07/24 1940 11/07/24 2240 11/08/24 0318 11/08/24 0723   BP: 137/59 127/57 141/63 132/65   BP Location:  Right arm     Pulse: 69 69 74 67   Resp:  20  18   Temp: (!) 97.2 °F (36.2 °C) 97.7 °F (36.5 °C)  (!) 97.2 °F (36.2 °C)   TempSrc:  Oral     SpO2: 96% 91% (!) 89%  96%   Weight:       Height:           Intake/Output Summary (Last 24 hours) at 11/8/2024 0822  Last data filed at 11/8/2024 0701  Gross per 24 hour   Intake 1150 ml   Output 2145 ml   Net -995 ml     General: conscious, cooperative, no distress  Skin: dry  Eyes: pink conjunctivae  ENT: moist mucous membranes  Respiratory: equal chest expansion, clear breath sounds.  Cardiovascular: distinct heart sounds, normal rate, regular rhythm, no rub  Abdomen: soft, non tender, non distended, normal bowel sounds  Extremities: + mild LE edema.   Genitourinary: no gaitan catheter.   Neuro: awake, alert.   Psych: appropriate affect    Medications:    Current Facility-Administered Medications:     acetaminophen (TYLENOL) tablet 650 mg, 650 mg, Oral, Q4H PRN, Freddy Foriegn, DO, 650 mg at 11/08/24 0243    allopurinol (ZYLOPRIM) tablet 100 mg, 100 mg, Oral, Daily, Freddy Foreign, DO, 100 mg at 11/07/24 0805    aspirin chewable tablet 81 mg, 81 mg, Oral, Daily, Freddy Foreign, DO, 81 mg at 11/07/24 0805    atorvastatin (LIPITOR) tablet 80 mg, 80 mg, Oral, Daily With Dinner, Freddy Foreign, DO, 80 mg at 11/07/24 1658    gabapentin (NEURONTIN) capsule 200 mg, 200 mg, Oral, BID, Freddy Foreign, DO, 200 mg at 11/07/24 1701    heparin (porcine) subcutaneous injection 5,000 Units, 5,000 Units, Subcutaneous, Q8H KEMAL, Freddy Foreign, DO, 5,000 Units at 11/08/24 0632    hydrALAZINE (APRESOLINE) injection 10 mg, 10 mg, Intravenous, Q4H PRN, Freddy Foreign, DO    methocarbamol (ROBAXIN) tablet 500 mg, 500 mg, Oral, Q6H PRN, GRANT Rich, 500 mg at 11/08/24 0243    NIFEdipine (PROCARDIA XL) 24 hr tablet 60 mg, 60 mg, Oral, After Dinner, Freddy Foreign, DO, 60 mg at 11/07/24 1700    ondansetron (ZOFRAN) injection 4 mg, 4 mg, Intravenous, Q4H PRN, Freddy Carrasquillo DO    pantoprazole (PROTONIX) EC tablet 40 mg, 40 mg, Oral, Early Morning, Freddy Carrasquillo DO, 40 mg at 11/08/24 0632    sevelamer (RENAGEL) tablet 800 mg, 800 mg, Oral, TID With Meals,  "Freddy Carrasquillo DO, 800 mg at 11/08/24 0723    Laboratory Results:  Results from last 7 days   Lab Units 11/08/24  0520 11/07/24  0446 11/06/24  2157 11/06/24  1404   WBC Thousand/uL 6.28 6.23  --  6.71   HEMOGLOBIN g/dL 8.1* 8.2*  --  8.6*   HEMATOCRIT % 25.3* 25.4*  --  27.3*   PLATELETS Thousands/uL 193 195  --  192   POTASSIUM mmol/L 4.5 4.7 4.5 6.9*   CHLORIDE mmol/L 95* 96 94* 96   CO2 mmol/L 25 23 21 18*   BUN mg/dL 57* 96* 87* 127*   CREATININE mg/dL 5.48* 7.45* 6.97* 9.56*   CALCIUM mg/dL 7.7* 8.0* 8.3* 7.9*   MAGNESIUM mg/dL  --   --   --  2.5     Portions of the record may have been created with voice recognition software. Occasional wrong word or \"sound a like\" substitutions may have occurred due to the inherent limitations of voice recognition software. Read the chart carefully and recognize, using context, where substitutions have occurred.If you have any questions, please contact the dictating provider.    "

## 2024-11-08 NOTE — PROGRESS NOTES
Progress Note - Hospitalist   Name: Naresh Carpio 64 y.o. male I MRN: [id number]  Unit/Bed#: [address] 420[address]01 I Date of Admission: 11/6/2024   Date of Service: 11/8/2024 I Hospital Day: 1    Assessment & Plan  Hyperkalemia  History of ESRD on HD TTS paroxysmal atrial fibrillation hypertension diabetes with PAD who presents to the hospital for fall  The patient has been having diarrhea and slipped on fecal matter falling.  He was on the floor for 2 hours and came here to the hospital where he was found to have hypertension and hyperkalemia.  Seen by nephrology.  Underwent urgent HD on admission and currently resumed TTS .  Received calcium gluconate along with albuterol 10 mg solution  Hyperkalemia has resolved  Disposition: Home tomorrow after dialysis if stronger    Results from last 7 days   Lab Units 11/08/24 0520 11/07/24 0446 11/06/24 2157 11/06/24  1404   POTASSIUM mmol/L 4.5 4.7 4.5 6.9*     Anemia due to chronic kidney disease, on chronic dialysis (HCC)  Patient also reports melanotic stools.    Seen by GI offered endoscopy, patient declined    Results from last 7 days   Lab Units 11/08/24  0520 11/07/24 0446 11/06/24  1404   HEMOGLOBIN g/dL 8.1* 8.2* 8.6*     Hypertension  Continue nifedipine.  No longer on losartan.    Diabetes mellitus type 2 with peripheral artery disease (HCC)  Lab Results   Component Value Date    HGBA1C 5.5 10/08/2024     Results from last 7 days   Lab Units 11/08/24  0520 11/07/24 0446 11/06/24 2157 11/06/24  1404   GLUCOSE RANDOM mg/dL 90 88 117 104     History of diabetes mellitus no longer on any agents.  Stable with a.m. labs.  ESRD (end stage renal disease) (HCC)  ESRD on HD TTS missed dialysis on Tuesday.    Results from last 7 days   Lab Units 11/08/24 0520 11/07/24 0446 11/06/24 2157 11/06/24  1404   BUN mg/dL 57* 96* 87* 127*   CREATININE mg/dL 5.48* 7.45* 6.97* 9.56*   EGFR ml/min/1.73sq m 10 6 7 5     Atrial fibrillation (HCC)  Paroxysmal atrial fibrillation  patient reports no longer on Eliquis.  Continue aspirin.  Did have rapid atrial fibrillation last night short-lived.  Converted with 1 dose of IV diltiazem  Fall  Fall with debility.  Lives alone.    Seen by PT/OT level 3 resource  Chronic kidney disease-mineral and bone disorder  Lab Results   Component Value Date    EGFR 10 2024    EGFR 6 2024    EGFR 7 2024    CREATININE 5.48 (H) 2024    CREATININE 7.45 (H) 2024    CREATININE 6.97 (H) 2024       VTE Pharmacologic Prophylaxis: VTE Score: 3 Moderate Risk (Score 3-4) - Pharmacological DVT Prophylaxis Ordered: heparin.    Mobility:   Basic Mobility Inpatient Raw Score: 23  JH-HLM Goal: 7: Walk 25 feet or more  JH-HLM Achieved: 7: Walk 25 feet or more  JH-HLM Goal achieved. Continue to encourage appropriate mobility.    Patient Centered Rounds: I have performed bedside rounds with nursing staff today.  Discussions with Specialists or Other Care Team Provider: Case management and nephrology    Education and Discussions with Family / Patient:  Offered to call sister.  Patient instructed not to call her with any updates..     Current Length of Stay: 1 day(s)  Current Patient Status: Inpatient   Certification Statement: The patient will continue to require additional inpatient hospital stay due to debility  Discharge Plan: Anticipate discharge tomorrow to home after hemodialysis tomorrow    Code Status: Level 1 - Full Code    Subjective   Patient seen and examined.  Still feeling weak.    Objective   Vitals:   Temp (24hrs), Av.4 °F (36.3 °C), Min:97.2 °F (36.2 °C), Max:97.8 °F (36.6 °C)    Temp:  [97.2 °F (36.2 °C)-97.8 °F (36.6 °C)] 97.2 °F (36.2 °C)  HR:  [67-88] 67  Resp:  [18-20] 18  BP: (123-149)/(57-65) 132/65  SpO2:  [89 %-96 %] 96 %  Body mass index is 30.86 kg/m².     Input and Output Summary (last 24 hours):     Intake/Output Summary (Last 24 hours) at 2024 1259  Last data filed at 2024 0701  Gross per 24 hour    Intake 360 ml   Output 325 ml   Net 35 ml       Physical Exam  Vitals reviewed.   Constitutional:       General: He is not in acute distress.  HENT:      Head: Atraumatic.   Cardiovascular:      Rate and Rhythm: Regular rhythm.      Heart sounds: Murmur heard.   Pulmonary:      Effort: Pulmonary effort is normal.      Breath sounds: Decreased breath sounds present. No wheezing.   Abdominal:      General: Bowel sounds are normal.      Palpations: Abdomen is soft.      Tenderness: There is no abdominal tenderness.   Musculoskeletal:         General: No tenderness.   Skin:     General: Skin is dry.      Coloration: Skin is not jaundiced.   Neurological:      General: No focal deficit present.      Mental Status: He is alert and oriented to person, place, and time.   Psychiatric:         Mood and Affect: Mood normal.       Lines/Drains:  Invasive Devices       Peripheral Intravenous Line  Duration             Peripheral IV 11/06/24 Distal;Right;Upper;Ventral (anterior) Arm 1 day              Hemodialysis Catheter  Duration             HD Permanent Double Catheter -- days                            Lab Results: I have reviewed the following results:   Results from last 7 days   Lab Units 11/08/24  0520 11/07/24 0446 11/06/24  1404   WBC Thousand/uL 6.28 6.23 6.71   HEMOGLOBIN g/dL 8.1* 8.2* 8.6*   PLATELETS Thousands/uL 193 195 192   MCV fL 94 93 96   INR   --  1.12  --      Results from last 7 days   Lab Units 11/08/24  0520 11/07/24  0446 11/06/24  2157 11/06/24  1404   SODIUM mmol/L 132* 134* 134* 132*   POTASSIUM mmol/L 4.5 4.7 4.5 6.9*   CHLORIDE mmol/L 95* 96 94* 96   CO2 mmol/L 25 23 21 18*   ANION GAP mmol/L 12 15* 19* 18*   BUN mg/dL 57* 96* 87* 127*   CREATININE mg/dL 5.48* 7.45* 6.97* 9.56*   CALCIUM mg/dL 7.7* 8.0* 8.3* 7.9*   ALBUMIN g/dL  --   --   --  3.9   TOTAL BILIRUBIN mg/dL  --   --   --  0.41   ALK PHOS U/L  --   --   --  116*   ALT U/L  --   --   --  70*   AST U/L  --   --   --  37   EGFR  ml/min/1.73sq m 10 6 7 5   GLUCOSE RANDOM mg/dL 90 88 117 104     Results from last 7 days   Lab Units 11/06/24  1404   MAGNESIUM mg/dL 2.5     Results from last 7 days   Lab Units 11/06/24  1404   CK TOTAL U/L 101                  Results from last 7 days   Lab Units 11/06/24  2147   POC GLUCOSE mg/dl 129               Recent Cultures (last 7 days):         Imaging:  Reviewed radiology reports from this admission including:  No results found.    Last 24 Hours Medication List:     Current Facility-Administered Medications:     acetaminophen (TYLENOL) tablet 650 mg, Q4H PRN    allopurinol (ZYLOPRIM) tablet 100 mg, Daily    aspirin chewable tablet 81 mg, Daily    atorvastatin (LIPITOR) tablet 80 mg, Daily With Dinner    gabapentin (NEURONTIN) capsule 200 mg, BID    heparin (porcine) subcutaneous injection 5,000 Units, Q8H KEMAL    hydrALAZINE (APRESOLINE) injection 10 mg, Q4H PRN    methocarbamol (ROBAXIN) tablet 500 mg, Q6H PRN    NIFEdipine (PROCARDIA XL) 24 hr tablet 60 mg, After Dinner    ondansetron (ZOFRAN) injection 4 mg, Q4H PRN    pantoprazole (PROTONIX) EC tablet 40 mg, Early Morning    sevelamer (RENAGEL) tablet 800 mg, TID With Meals    Administrative Statements   Today, Patient Was Seen By: Freddy Carrasquillo, DO  I have spent a total time of 35 minutes in caring for this patient on the day of the visit/encounter including Patient and family education, Documenting in the medical record, Reviewing / ordering tests, medicine, procedures  , and Communicating with other healthcare professionals .    **Please Note: This note may have been constructed using a voice recognition system.**

## 2024-11-08 NOTE — PHYSICAL THERAPY NOTE
PT TREATMENT     11/08/24 1035   PT Last Visit   PT Visit Date 11/08/24   Note Type   Note Type Treatment   Pain Assessment   Pain Assessment Tool Farr-Baker FACES   Farr-Baker FACES Pain Rating 4  (chronic LLE pain)   Restrictions/Precautions   Other Precautions Fall Risk;Pain   General   Chart Reviewed Yes   Family/Caregiver Present No   Cognition   Arousal/Participation Cooperative   Subjective   Subjective patient states hoping to go home tomorrow after dialysis   Bed Mobility   Additional Comments patient sitting on bed edge upon arrival by therapist   Transfers   Sit to Stand 7  Independent   Stand to Sit 7  Independent   Ambulation/Elevation   Gait Assistance 5  Supervision   Additional items Verbal cues  (cuing for upright standing posturing with forward flexed positioning)   Assistive Device Rolling walker   Distance 100 feet with change in direction   Stair Management Assistance   (supervision to min assist)   Additional items Assist x 1   Stair Management Technique Two rails;Step to pattern   Number of Stairs 8   Balance   Static Sitting Fair +   Dynamic Sitting Fair   Static Standing Fair   Dynamic Standing Fair   Ambulatory Fair   Activity Tolerance   Activity Tolerance Patient tolerated treatment well;Patient limited by fatigue   Nurse Made Aware yes   Exercises   Knee AROM Long Arc Quad Sitting;5 reps;Bilateral   Marching Standing;10 reps;Bilateral   Balance training  sidestepping and backward stepping completed for balance and coordination, sit to and from stand completed without UE support   Assessment   Assessment patient cooperative and motivated and demonstrating improving gait balance, endurance and coordination on level and unlevel surfaces. patient will benefit from continued PT with progression as tolerated and increasing functional mobility with clinical staff as well. Patient demonstrating independence with short distance ambulation in his room and completing bathing/ADLs without assist,  therefore, skilled OT not required at this time. DC OT. The patient's AM-PAC Basic Mobility Inpatient Short Form Raw Score is 23. A Raw score of greater than 16 suggests the patient may benefit from discharge to home. Please also refer to the recommendation of the Physical Therapist for safe discharge planning.         Plan   Treatment/Interventions Functional transfer training   AM-PAC Basic Mobility Inpatient   Turning in Flat Bed Without Bedrails 4   Lying on Back to Sitting on Edge of Flat Bed Without Bedrails 4   Moving Bed to Chair 4   Standing Up From Chair Using Arms 4   Walk in Room 4   Climb 3-5 Stairs With Railing 3   Basic Mobility Inpatient Raw Score 23   Basic Mobility Standardized Score 50.88   [facility] Highest Level Of Mobility   -HLM Goal 7: Walk 25 feet or more   JH-HLM Achieved 7: Walk 25 feet or more   Education   Patient Demonstrates acceptance/verbal understanding;Explanation/teachback used;Demonstrates verbal understanding   Licensure   [place] License Number  Anat Lal PT [id number]

## 2024-11-08 NOTE — PLAN OF CARE
Problem: METABOLIC, FLUID AND ELECTROLYTES - ADULT  Goal: Electrolytes maintained within normal limits  Description: INTERVENTIONS:  - Monitor labs and assess patient for signs and symptoms of electrolyte imbalances  - Administer electrolyte replacement as ordered  - Monitor response to electrolyte replacements, including repeat lab results as appropriate  - Instruct patient on fluid and nutrition as appropriate  11/7/2024 2152 by Michelle Walker RN  Outcome: Progressing  11/7/2024 2152 by Michelle Walker RN  Outcome: Progressing  11/7/2024 2151 by Michelle Walker RN  Outcome: Progressing     Problem: HEMATOLOGIC - ADULT  Goal: Maintains hematologic stability  Description: INTERVENTIONS  - Assess for signs and symptoms of bleeding or hemorrhage  - Monitor labs  - Administer supportive blood products/factors as ordered and appropriate  11/7/2024 2152 by Michelle Walker RN  Outcome: Progressing  11/7/2024 2152 by Michelle Walker RN  Outcome: Progressing

## 2024-11-08 NOTE — PLAN OF CARE
Problem: Potential for Falls  Goal: Patient will remain free of falls  Description: INTERVENTIONS:  - Educate patient/family on patient safety including physical limitations  - Instruct patient to call for assistance with activity   - Consult OT/PT to assist with strengthening/mobility   - Keep Call bell within reach  - Keep bed low and locked with side rails adjusted as appropriate  - Keep care items and personal belongings within reach  - Initiate and maintain comfort rounds  - Make Fall Risk Sign visible to staff  - Offer Toileting every 2 Hours, in advance of need  - Initiate/Maintain bed alarm  Problem: METABOLIC, FLUID AND ELECTROLYTES - ADULT  Goal: Electrolytes maintained within normal limits  Description: INTERVENTIONS:  - Monitor labs and assess patient for signs and symptoms of electrolyte imbalances  - Administer electrolyte replacement as ordered  - Monitor response to electrolyte replacements, including repeat lab results as appropriate  - Instruct patient on fluid and nutrition as appropriate  Outcome: Progressing  Goal: Fluid balance maintained  Description: INTERVENTIONS:  - Monitor labs   - Monitor I/O and WT  - Instruct patient on fluid and nutrition as appropriate  - Assess for signs & symptoms of volume excess or deficit  Outcome: Progressing     Problem: Prexisting or High Potential for Compromised Skin Integrity  Goal: Skin integrity is maintained or improved  Description: INTERVENTIONS:  - Identify patients at risk for skin breakdown  - Assess and monitor skin integrity  - Assess and monitor nutrition and hydration status  - Monitor labs   - Assess for incontinence   - Turn and reposition patient  - Assist with mobility/ambulation  - Relieve pressure over bony prominences  - Avoid friction and shearing  - Provide appropriate hygiene as needed including keeping skin clean and dry  - Evaluate need for skin moisturizer/barrier cream  - Collaborate with interdisciplinary team   - Patient/family  teaching  - Consider wound care consult   Outcome: Progressing     Problem: Nutrition/Hydration-ADULT  Goal: Nutrient/Hydration intake appropriate for improving, restoring or maintaining nutritional needs  Description: Monitor and assess patient's nutrition/hydration status for malnutrition. Collaborate with interdisciplinary team and initiate plan and interventions as ordered.  Monitor patient's weight and dietary intake as ordered or per policy. Utilize nutrition screening tool and intervene as necessary. Determine patient's food preferences and provide high-protein, high-caloric foods as appropriate.     INTERVENTIONS:  - Monitor oral intake, urinary output, labs, and treatment plans  - Assess nutrition and hydration status and recommend course of action  - Evaluate amount of meals eaten  - Assist patient with eating if necessary   - Allow adequate time for meals  - Recommend/ encourage appropriate diets, oral nutritional supplements, and vitamin/mineral supplements  - Order, calculate, and assess calorie counts as needed  - Recommend, monitor, and adjust tube feedings and TPN/PPN based on assessed needs  - Assess need for intravenous fluids  - Provide specific nutrition/hydration education as appropriate  - Include patient/family/caregiver in decisions related to nutrition  Outcome: Progressing     Problem: HEMATOLOGIC - ADULT  Goal: Maintains hematologic stability  Description: INTERVENTIONS  - Assess for signs and symptoms of bleeding or hemorrhage  - Monitor labs  - Administer supportive blood products/factors as ordered and appropriate  Outcome: Progressing     - Apply yellow socks and bracelet for high fall risk patients  - Consider moving patient to room near nurses station  Outcome: Progressing

## 2024-11-08 NOTE — DISCHARGE INSTR - OTHER ORDERS
Wound Care Plan:    1-Cleanse wounds to right and left lower legs, left 3rd toe and left medial foot with normal saline or mild soap and water & pat dry. Apply THIN layer of Normlgel to wounds, cover with Adaptic cut to size of wounds, gauze or ABD, rolled gauze and tape. Change every other day & as needed for soilage/dislodgement.  2-Apply Prevent barrier cream or equivalent to bilateral sacrum, buttock and heels 2x/day and as needed.  3-Float heels on 2 pillows in bed to offload pressure so heels are not in contact with mattress or pillows.  4-Use pressure redistribution cushion in chair when out of bed. Avoid prolonged sitting.  5-Moisturize skin daily with skin nourishing cream.  6-Turn/reposition every 2 hrs for pressure re-distribution on skin.   7-Follow up at [facility] - call Central Scheduling for appointment: [phone].

## 2024-11-08 NOTE — OCCUPATIONAL THERAPY NOTE
Occupational Therapy Screen     Patient Name: Naresh Carpio  Today's Date: 11/8/2024  Problem List  Principal Problem:    Hyperkalemia  Active Problems:    Anemia due to chronic kidney disease, on chronic dialysis (HCC)    Hypertension    Diabetes mellitus type 2 with peripheral artery disease (HCC)    Chronic kidney disease-mineral and bone disorder    ESRD (end stage renal disease) (HCC)    Atrial fibrillation (HCC)    Hyperphosphatemia    Fall    Melena    Past Medical History  Past Medical History:   Diagnosis Date    Acute cystitis 10/18/2024    Acute on chronic anemia 01/23/2022    Atrial fibrillation (HCC)     Diabetes mellitus (HCC)     ESRD (end stage renal disease) on dialysis (HCC)     Hematuria 10/10/2024    Hematuria 10/10/2024    Hyperkalemia 04/06/2024    Hyperlipidemia     Hypertension     Left toe amputee (HCC)     TIA (transient ischemic attack)     Toxic metabolic encephalopathy 10/08/2024     Past Surgical History  Past Surgical History:   Procedure Laterality Date    AMPUTATION Left     left foot resection 10 years ago dr tran    IR LOWER EXTREMITY ANGIOGRAM  08/29/2023    IR TEMPORARY DIALYSIS CATHETER PLACEMENT  08/25/2023    IR TUNNELED CENTRAL LINE REMOVAL  08/23/2023    IR TUNNELED DIALYSIS CATHETER PLACEMENT  01/27/2022    IR TUNNELED DIALYSIS CATHETER PLACEMENT  08/30/2023    [facility] AMPUTATION METATARSAL W/TOE SINGLE Right 8/31/2023    Procedure: RIGHT PARTIAL 1ST RAY RESECTION WITH  WOUND VAC APPLICATION;  Surgeon: Won Parmar DPM;  Location: [facility];  Service: Podiatry         11/08/24 1204   OT Last Visit   OT Visit Date 11/08/24  (Friday)   Note Type   Note type Screen   Additional Comments OT orders received and chart review completed. Observed pt ambulating in james using RW independently. RN reports pt completed bathing this AM w/ out assistance after set- up. Spoke w/ PTAnat. Pt is completing ADLs at baseline level of I. Recommend active participation in acute care.  Will screen from OT. Please re consult if acute needs arise / change in functional status.   Licensure   [place] License Number  Lori Bentley, OTR/L [id number]       Lori Bentley OTR/L  [id number]  [id number]

## 2024-11-08 NOTE — WOUND OSTOMY CARE
Progress Note - Wound   Naresh Carpio 64 y.o. male MRN: [id number]  Unit/Bed#: [address] [place] [address] Encounter: [id number]        Assessment:   This is a 64 year old male patient admitted on 11/6/24 with hyperkalemia. Patient was AAO x 3 and agreeable to have wound visit done. He was turned and repositioned with assist of 2 persons. Patient is continent of urine with no record of BM.    Findings:  1-Full thickness traumatic wound to left lower extremity with black eschar, pink granulation tissue and no drainage. Orders in place for wound care.  2-Partial thickness intact blisters to right anterior lower leg with no drainage. Orders in place for wound care.  3-Full thickness wound to left medial lower leg with yellow slough, pink granulation tissue and no drainage due to wound left uncovered. Orders in place for wound care.  4-Full thickness diabetic wounds to left medial foot and left 3rd toe with black eschar, pink granulation tissue and no drainage. Orders in place for wound care.    Wound Care Plan:  1-Cleanse wounds to right and left lower legs, left 3rd toe and left medial foot with NSS & pat dry. Apply THIN layer of Normlgel to wounds, cover with Adaptic cut to size of wounds, gauze or ABD, rolled gauze and tape. Change every other day & prn soilage/dislodgement.  2-Apply Prevent barrier cream to bilateral sacrum, buttock and heels BID and PRN  3-Float heels on 2 pillows to offload pressure so heels are not in contact with mattress or pillows.  4-Ehob pressure redistribution cushion in chair when out of bed. Avoid prolonged sitting.  5-Moisturize skin daily with skin nourishing cream.  6-Turn/reposition q2h or when medically stable for pressure re-distribution on skin.     Wound 11/06/24 Traumatic Abrasion(s) Leg Left;Anterior (Active)   Wound Image   11/07/24 1154   Wound Description Dry;Black;Eschar;Granulation tissue;Pink 11/07/24 1154   Liliana-wound Assessment Intact;Erythema 11/07/24 1146   Wound Length (cm) 2.9  cm 11/07/24 1154   Wound Width (cm) 3.5 cm 11/07/24 1154   Wound Depth (cm) 0.1 cm 11/07/24 1154   Wound Surface Area (cm^2) 10.15 cm^2 11/07/24 1154   Wound Volume (cm^3) 1.015 cm^3 11/07/24 1154   Calculated Wound Volume (cm^3) 1.02 cm^3 11/07/24 1154   Drainage Amount None 11/07/24 1154   Non-staged Wound Description Full thickness 11/07/24 1146   Treatments Cleansed 11/07/24 1154   Dressing Normlgel;Adaptic;ABD;Dry dressing 11/07/24 1146   Dressing Changed New 11/07/24 1154   Dressing Status Clean;Dry;Intact 11/07/24 1154       Wound 11/07/24 Venous Ulcer Leg Right;Anterior (Active)   Wound Image   11/07/24 1154   Wound Description Intact;Other (Comment) 11/07/24 1154   Liliana-wound Assessment Edema;Erythema 11/07/24 1154   Wound Length (cm) 3.5 cm 11/07/24 1154   Wound Width (cm) 4 cm 11/07/24 1154   Wound Depth (cm) 0 cm 11/07/24 1154   Wound Surface Area (cm^2) 14 cm^2 11/07/24 1154   Wound Volume (cm^3) 0 cm^3 11/07/24 1154   Calculated Wound Volume (cm^3) 0 cm^3 11/07/24 1154   Drainage Amount None 11/07/24 1154   Non-staged Wound Description Partial thickness 11/07/24 1154   Treatments Cleansed 11/07/24 1154   Dressing Normlgel;Adaptic;ABD;Dry dressing 11/07/24 1154   Dressing Changed New 11/07/24 1154   Dressing Status Clean;Dry;Intact 11/07/24 1154       Wound 11/07/24 Venous Ulcer Leg Left;Medial (Active)   Wound Image   11/07/24 1148   Wound Description Slough;Yellow;Dark edges 11/07/24 1148   Liliana-wound Assessment Edema 11/07/24 1148   Wound Length (cm) 6 cm 11/07/24 1148   Wound Width (cm) 2.5 cm 11/07/24 1148   Wound Depth (cm) 0.1 cm 11/07/24 1148   Wound Surface Area (cm^2) 15 cm^2 11/07/24 1148   Wound Volume (cm^3) 1.5 cm^3 11/07/24 1148   Calculated Wound Volume (cm^3) 1.5 cm^3 11/07/24 1148   Drainage Amount None 11/07/24 1148   Non-staged Wound Description Full thickness 11/07/24 1148   Treatments Cleansed 11/07/24 1148   Dressing Adaptic;Normlgel;ABD;Dry dressing 11/07/24 1148   Dressing  Status Clean;Dry;Intact 11/07/24 1148       Wound 11/07/24 Diabetic Ulcer Foot Left;Medial (Active)   Wound Image   11/07/24 1149   Wound Description Black;Eschar;Granulation tissue;Pink 11/07/24 1149   Liliana-wound Assessment Intact 11/07/24 1149   Wound Length (cm) 0.8 cm 11/07/24 1149   Wound Width (cm) 1 cm 11/07/24 1149   Wound Depth (cm) 0.1 cm 11/07/24 1149   Wound Surface Area (cm^2) 0.8 cm^2 11/07/24 1149   Wound Volume (cm^3) 0.08 cm^3 11/07/24 1149   Calculated Wound Volume (cm^3) 0.08 cm^3 11/07/24 1149   Treatments Cleansed 11/07/24 1149   Dressing Normlgel;Adaptic;ABD;Dry dressing 11/07/24 1149   Dressing Changed New 11/07/24 1149   Dressing Status Clean;Dry;Intact 11/07/24 1149       Wound 11/07/24 Diabetic Ulcer Toe D3, third Anterior;Left (Active)   Wound Image   11/07/24 1152   Wound Description Black;Eschar;Granulation tissue;Pink 11/07/24 1152   Liliana-wound Assessment Intact 11/07/24 1152   Wound Length (cm) 0.5 cm 11/07/24 1152   Wound Width (cm) 0.4 cm 11/07/24 1152   Wound Depth (cm) 0.1 cm 11/07/24 1152   Wound Surface Area (cm^2) 0.2 cm^2 11/07/24 1152   Wound Volume (cm^3) 0.02 cm^3 11/07/24 1152   Calculated Wound Volume (cm^3) 0.02 cm^3 11/07/24 1152   Drainage Amount None 11/07/24 1152   Non-staged Wound Description Full thickness 11/07/24 1152   Treatments Cleansed 11/07/24 1152   Dressing Normlgel;Adaptic ;Dry dressing;Gauze 11/07/24 1152   Dressing Changed New 11/07/24 1152   Dressing Status Clean;Dry;Intact 11/07/24 1152     Discussed assessment findings, and plan of care/recommendations with Krista RAWLS.    Wound care will follow along with patient throughout admission, please call or text with questions and concerns    Recommendations written as orders.  Love Andrew MSN, RN, CWON

## 2024-11-09 ENCOUNTER — APPOINTMENT (INPATIENT)
Dept: DIALYSIS | Facility: HOSPITAL | Age: 64
DRG: 640 | End: 2024-11-09
Payer: MEDICARE

## 2024-11-09 VITALS
SYSTOLIC BLOOD PRESSURE: 184 MMHG | BODY MASS INDEX: 31 KG/M2 | WEIGHT: 233.91 LBS | HEIGHT: 73 IN | OXYGEN SATURATION: 97 % | DIASTOLIC BLOOD PRESSURE: 72 MMHG | TEMPERATURE: 97.9 F | RESPIRATION RATE: 16 BRPM | HEART RATE: 85 BPM

## 2024-11-09 LAB
ANION GAP SERPL CALCULATED.3IONS-SCNC: 13 MMOL/L (ref 4–13)
BUN SERPL-MCNC: 71 MG/DL (ref 5–25)
CALCIUM SERPL-MCNC: 7.2 MG/DL (ref 8.4–10.2)
CHLORIDE SERPL-SCNC: 94 MMOL/L (ref 96–108)
CO2 SERPL-SCNC: 24 MMOL/L (ref 21–32)
CREAT SERPL-MCNC: 6.95 MG/DL (ref 0.6–1.3)
ERYTHROCYTE [DISTWIDTH] IN BLOOD BY AUTOMATED COUNT: 15.5 % (ref 11.6–15.1)
GFR SERPL CREATININE-BSD FRML MDRD: 7 ML/MIN/1.73SQ M
GLUCOSE SERPL-MCNC: 86 MG/DL (ref 65–140)
HCT VFR BLD AUTO: 24.7 % (ref 36.5–49.3)
HGB BLD-MCNC: 8 G/DL (ref 12–17)
MCH RBC QN AUTO: 30.5 PG (ref 26.8–34.3)
MCHC RBC AUTO-ENTMCNC: 32.4 G/DL (ref 31.4–37.4)
MCV RBC AUTO: 94 FL (ref 82–98)
PHOSPHATE SERPL-MCNC: 6.5 MG/DL (ref 2.3–4.1)
PLATELET # BLD AUTO: 203 THOUSANDS/UL (ref 149–390)
PMV BLD AUTO: 10.5 FL (ref 8.9–12.7)
POTASSIUM SERPL-SCNC: 5 MMOL/L (ref 3.5–5.3)
RBC # BLD AUTO: 2.62 MILLION/UL (ref 3.88–5.62)
SODIUM SERPL-SCNC: 131 MMOL/L (ref 135–147)
WBC # BLD AUTO: 5.97 THOUSAND/UL (ref 4.31–10.16)

## 2024-11-09 PROCEDURE — 99239 HOSP IP/OBS DSCHRG MGMT >30: CPT | Performed by: INTERNAL MEDICINE

## 2024-11-09 PROCEDURE — 99232 SBSQ HOSP IP/OBS MODERATE 35: CPT | Performed by: INTERNAL MEDICINE

## 2024-11-09 PROCEDURE — 84100 ASSAY OF PHOSPHORUS: CPT | Performed by: INTERNAL MEDICINE

## 2024-11-09 PROCEDURE — 85027 COMPLETE CBC AUTOMATED: CPT | Performed by: INTERNAL MEDICINE

## 2024-11-09 PROCEDURE — 80048 BASIC METABOLIC PNL TOTAL CA: CPT | Performed by: INTERNAL MEDICINE

## 2024-11-09 RX ORDER — POLYETHYLENE GLYCOL 3350 17 G/17G
17 POWDER, FOR SOLUTION ORAL ONCE
Status: DISCONTINUED | OUTPATIENT
Start: 2024-11-09 | End: 2024-11-09 | Stop reason: HOSPADM

## 2024-11-09 RX ADMIN — ALLOPURINOL 100 MG: 100 TABLET ORAL at 11:30

## 2024-11-09 RX ADMIN — SEVELAMER HYDROCHLORIDE 800 MG: 800 TABLET, FILM COATED ORAL at 11:30

## 2024-11-09 RX ADMIN — GABAPENTIN 200 MG: 100 CAPSULE ORAL at 09:32

## 2024-11-09 RX ADMIN — ASPIRIN 81 MG CHEWABLE TABLET 81 MG: 81 TABLET CHEWABLE at 11:30

## 2024-11-09 RX ADMIN — HEPARIN SODIUM 5000 UNITS: 5000 INJECTION, SOLUTION INTRAVENOUS; SUBCUTANEOUS at 05:09

## 2024-11-09 RX ADMIN — PANTOPRAZOLE SODIUM 40 MG: 40 TABLET, DELAYED RELEASE ORAL at 05:09

## 2024-11-09 NOTE — PROGRESS NOTES
NEPHROLOGY HOSPITAL PROGRESS NOTE   Naresh Carpio 64 y.o. male MRN: [id number]  Unit/Bed#: [address] [place] [address] Encounter: [id number]  Reason for Consult: ESRD on HD    Brief History of Admission -64-year-old gentleman with ESRD on HD, HTN, DM, HLP, CAD, TIA, atrial fibrillation who presented to [facility] on November 6, 2024 with a fall.  Noted to be hyperkalemic on admission requiring urgent dialysis.    TODAY's DISCUSSION / PLAN SUMMARY:  Plan for HD today.  2 kg UF on HD.   Stable for discharge from renal standpoint after completion of HD.  MiraLAX 17 g PO x 1 for constipation.     Assessment & Plan  ESRD (end stage renal disease) (HCC)  [facility]  Access: Right IJ PermCath   kg  Missed 1 HD tx prior to admission and admitted with hyperkalemia requiring urgent HD on 11/6/24.   Regular HD today.    Hyperkalemia  K 6.9 on admission.  Improved after medical management and urgent HD.  K 5.0 today.  Plan for 2K bath today.    Hypertension  BP above goal.   Rx: Nifedipine 60 mg OD.   He reports not taking losartan at home for the past few months.   Plan for 2 kg UF on HD today.     Anemia due to chronic kidney disease, on chronic dialysis ([facility])  Hemoglobin below goal but stable. 8.0 today  Currently not on SURINDER.    Chronic kidney disease-mineral and bone disorder  Continue sevelamer 800 mg TID for hyperphosphatemia.  Continue renal diet.  Phos is above goal.     Fall  Defer to primary team.    Melena  Defer to primary team.  GI consulted.     SUBJECTIVE / 24H INTERVAL HISTORY:  Complaining of abdominal discomfort and constipation.  No CP or SOB.    OBJECTIVE:  Current Weight: Weight - Scale: 106 kg (233 lb 14.5 oz)  Vitals:    11/08/24 2208 11/09/24 0745 11/09/24 0800 11/09/24 0830   BP: 168/55 143/67 152/61 157/64   BP Location:  Right arm     Pulse: 64 71 66 75   Resp: 16 16 16 16   Temp: (!) 97.4 °F (36.3 °C) 97.8 °F (36.6 °C)     TempSrc:  Tympanic     SpO2: 97%      Weight:        Height:           Intake/Output Summary (Last 24 hours) at 11/9/2024 0902  Last data filed at 11/9/2024 0745  Gross per 24 hour   Intake 450 ml   Output 200 ml   Net 250 ml     General: conscious, cooperative, no distress  Skin: dry  Eyes: pink conjunctivae  ENT: moist mucous membranes  Respiratory: equal chest expansion, clear breath sounds.  Cardiovascular: distinct heart sounds, normal rate, regular rhythm, no rub  Abdomen: soft, non tender, non distended, normal bowel sounds  Extremities: Mild LE edema. Blisters noted.   Genitourinary: no gaitan catheter.   Neuro: awake, alert.   Psych: appropriate affect    Medications:    Current Facility-Administered Medications:     acetaminophen (TYLENOL) tablet 650 mg, 650 mg, Oral, Q4H PRN, Freddy Foreign, DO, 650 mg at 11/08/24 0243    allopurinol (ZYLOPRIM) tablet 100 mg, 100 mg, Oral, Daily, Freddy Foreign, DO, 100 mg at 11/08/24 0831    aspirin chewable tablet 81 mg, 81 mg, Oral, Daily, Freddy Foreign, DO, 81 mg at 11/08/24 0831    atorvastatin (LIPITOR) tablet 80 mg, 80 mg, Oral, Daily With Dinner, Freddy Foreign, DO, 80 mg at 11/08/24 1721    gabapentin (NEURONTIN) capsule 200 mg, 200 mg, Oral, BID, Freddy Foreign, DO, 200 mg at 11/08/24 1721    heparin (porcine) subcutaneous injection 5,000 Units, 5,000 Units, Subcutaneous, Q8H KEMAL, Freddy Carrasquillo, DO, 5,000 Units at 11/09/24 0509    hydrALAZINE (APRESOLINE) injection 10 mg, 10 mg, Intravenous, Q4H PRN, Freddy Foreign, DO    methocarbamol (ROBAXIN) tablet 500 mg, 500 mg, Oral, Q6H PRN, GRANT Rich, 500 mg at 11/08/24 0243    NIFEdipine (PROCARDIA XL) 24 hr tablet 60 mg, 60 mg, Oral, After Dinner, Freddy Brockng, DO, 60 mg at 11/08/24 1721    ondansetron (ZOFRAN) injection 4 mg, 4 mg, Intravenous, Q4H PRN, Freddy Carrasquillo DO    pantoprazole (PROTONIX) EC tablet 40 mg, 40 mg, Oral, Early Morning, Freddy Carrasquillo DO, 40 mg at 11/09/24 0504    polyethylene glycol (MIRALAX) packet 17 g, 17 g, Oral, Once, Hamilton Rincon  "MD Frederick    sevelamer (RENAGEL) tablet 800 mg, 800 mg, Oral, TID With Meals, Freddy Carrasquillo , 800 mg at 11/08/24 1721    Laboratory Results:  Results from last 7 days   Lab Units 11/09/24  0755 11/08/24  0520 11/07/24  0446 11/06/24  2157 11/06/24  1404   WBC Thousand/uL 5.97 6.28 6.23  --  6.71   HEMOGLOBIN g/dL 8.0* 8.1* 8.2*  --  8.6*   HEMATOCRIT % 24.7* 25.3* 25.4*  --  27.3*   PLATELETS Thousands/uL 203 193 195  --  192   POTASSIUM mmol/L 5.0 4.5 4.7 4.5 6.9*   CHLORIDE mmol/L 94* 95* 96 94* 96   CO2 mmol/L 24 25 23 21 18*   BUN mg/dL 71* 57* 96* 87* 127*   CREATININE mg/dL 6.95* 5.48* 7.45* 6.97* 9.56*   CALCIUM mg/dL 7.2* 7.7* 8.0* 8.3* 7.9*   MAGNESIUM mg/dL  --   --   --   --  2.5   PHOSPHORUS mg/dL 6.5*  --   --   --   --      Portions of the record may have been created with voice recognition software. Occasional wrong word or \"sound a like\" substitutions may have occurred due to the inherent limitations of voice recognition software. Read the chart carefully and recognize, using context, where substitutions have occurred.If you have any questions, please contact the dictating provider.    "

## 2024-11-09 NOTE — PLAN OF CARE
Problem: Potential for Falls  Goal: Patient will remain free of falls  Description: INTERVENTIONS:  - Educate patient/family on patient safety including physical limitations  - Instruct patient to call for assistance with activity   - Consult OT/PT to assist with strengthening/mobility   - Keep Call bell within reach  - Keep bed low and locked with side rails adjusted as appropriate  - Keep care items and personal belongings within reach  - Initiate and maintain comfort rounds  - Make Fall Risk Sign visible to staff  - Offer Toileting every 2 Hours, in advance of need  - Initiate/Maintain bed alarm  - Obtain necessary fall risk management equipment:   - Apply yellow socks and bracelet for high fall risk patients  - Consider moving patient to room near nurses station  Outcome: Progressing     Problem: METABOLIC, FLUID AND ELECTROLYTES - ADULT  Goal: Electrolytes maintained within normal limits  Description: INTERVENTIONS:  - Monitor labs and assess patient for signs and symptoms of electrolyte imbalances  - Administer electrolyte replacement as ordered  - Monitor response to electrolyte replacements, including repeat lab results as appropriate  - Instruct patient on fluid and nutrition as appropriate  Outcome: Progressing  Goal: Fluid balance maintained  Description: INTERVENTIONS:  - Monitor labs   - Monitor I/O and WT  - Instruct patient on fluid and nutrition as appropriate  - Assess for signs & symptoms of volume excess or deficit  Outcome: Progressing     Problem: Prexisting or High Potential for Compromised Skin Integrity  Goal: Skin integrity is maintained or improved  Description: INTERVENTIONS:  - Identify patients at risk for skin breakdown  - Assess and monitor skin integrity  - Assess and monitor nutrition and hydration status  - Monitor labs   - Assess for incontinence   - Turn and reposition patient  - Assist with mobility/ambulation  - Relieve pressure over bony prominences  - Avoid friction and  shearing  - Provide appropriate hygiene as needed including keeping skin clean and dry  - Evaluate need for skin moisturizer/barrier cream  - Collaborate with interdisciplinary team   - Patient/family teaching  - Consider wound care consult   Outcome: Progressing     Problem: Nutrition/Hydration-ADULT  Goal: Nutrient/Hydration intake appropriate for improving, restoring or maintaining nutritional needs  Description: Monitor and assess patient's nutrition/hydration status for malnutrition. Collaborate with interdisciplinary team and initiate plan and interventions as ordered.  Monitor patient's weight and dietary intake as ordered or per policy. Utilize nutrition screening tool and intervene as necessary. Determine patient's food preferences and provide high-protein, high-caloric foods as appropriate.     INTERVENTIONS:  - Monitor oral intake, urinary output, labs, and treatment plans  - Assess nutrition and hydration status and recommend course of action  - Evaluate amount of meals eaten  - Assist patient with eating if necessary   - Allow adequate time for meals  - Recommend/ encourage appropriate diets, oral nutritional supplements, and vitamin/mineral supplements  - Order, calculate, and assess calorie counts as needed  - Recommend, monitor, and adjust tube feedings and TPN/PPN based on assessed needs  - Assess need for intravenous fluids  - Provide specific nutrition/hydration education as appropriate  - Include patient/family/caregiver in decisions related to nutrition  Outcome: Progressing     Problem: HEMATOLOGIC - ADULT  Goal: Maintains hematologic stability  Description: INTERVENTIONS  - Assess for signs and symptoms of bleeding or hemorrhage  - Monitor labs  - Administer supportive blood products/factors as ordered and appropriate  Outcome: Progressing     Problem: INFECTION - ADULT  Goal: Absence or prevention of progression during hospitalization  Description: INTERVENTIONS:  - Assess and monitor for  signs and symptoms of infection  - Monitor lab/diagnostic results  - Monitor all insertion sites, i.e. indwelling lines, tubes, and drains  - Monitor endotracheal if appropriate and nasal secretions for changes in amount and color  - [place] appropriate cooling/warming therapies per order  - Administer medications as ordered  - Instruct and encourage patient and family to use good hand hygiene technique  - Identify and instruct in appropriate isolation precautions for identified infection/condition  Outcome: Progressing  Goal: Absence of fever/infection during neutropenic period  Description: INTERVENTIONS:  - Monitor WBC    Outcome: Progressing     Problem: DISCHARGE PLANNING  Goal: Discharge to home or other facility with appropriate resources  Description: INTERVENTIONS:  - Identify barriers to discharge w/patient and caregiver  - Arrange for needed discharge resources and transportation as appropriate  - Identify discharge learning needs (meds, wound care, etc.)  - Arrange for interpretive services to assist at discharge as needed  - Refer to Case Management Department for coordinating discharge planning if the patient needs post-hospital services based on physician/advanced practitioner order or complex needs related to functional status, cognitive ability, or social support system  Outcome: Progressing     Problem: Knowledge Deficit  Goal: Patient/family/caregiver demonstrates understanding of disease process, treatment plan, medications, and discharge instructions  Description: Complete learning assessment and assess knowledge base.  Interventions:  - Provide teaching at level of understanding  - Provide teaching via preferred learning methods  Outcome: Progressing

## 2024-11-09 NOTE — ASSESSMENT & PLAN NOTE
"Lab Results   Component Value Date    HGBA1C 5.5 10/08/2024       No results for input(s): \"POCGLU\" in the last 72 hours.    Blood Sugar Average: Last 72 hrs:  -stable  -continue to optimize glycemic control  -management per primary team      "
-Etiology: Due to missed HD on ESRD  -K+ 6.9 on admission  -s/p medical management with calcium gluconate, albuterol, bicarb  -Plan for urgent 2-hour HD today with 1K bath for last hour of treatment  -Next full HD tomorrow  -Low potassium diet  -Avoid ACE, ARB, spironolactone.  Hold losartan  -Continue to monitor closely  
-Hgb 8.6, below goal.  Goal Hgb >10.0  -was on Epogen 3000 units with HD during recent hospitalization.  Previously not on SURINDER with Hgb at goal  -holding SURINDER for now in the setting of hypertension  -trend CBC  -transfuse for Hgb <7.0  -management per primary team   
-Hyperphosphatemia: as below  -Renal diet  -continue to monitor phosphorus, PTH, calcium, magnesium as outpatient    
-In the setting of ESRD  -Continue binders.  On Renagel 800 mg 3 times daily with meals  -Low phosphorus diet  -Continue to monitor  
-on maintenance HD TTS@[facility]  -Access: PATR[facility]  -EDW: 108 kg  -last treatment 11/2/2024 with missed HD x 1  -Plan for urgent 2-hour treatment due to hyperkalemia as outlined below  -next full treatment 11/7/2024.  Will plan to continue regular Tuesday, Thursday, Saturday schedule during hospitalization.    
-p/w fall and weakness  -+hyperkalemia  -see management as above  -PT/OT  -management per primary team  
-p/w hypertensive urgency  -BP hypertensive, suspect volume mediated due to missed HD  -volume status mild hypervolemia given HTN  -maintain goal BP <140/90  -home medications: Losartan 100 mg daily, nifedipine 60 mg daily  -hold losartan for now  -continue UF with dialysis as BP tolerates  -Avoid hypotension, maintain MAP >65  -continue to monitor    
-stable, rate controlled  -on eliquis  -management per Cardiology    
BP above goal.   Rx: Nifedipine 60 mg OD.   He reports not taking losartan at home for the past few months.   Plan for 2 kg UF on HD today.     
BP high on admission   BP now at goal.   Rx: Nifedipine 60 mg OD.   He reports not taking losartan at home for the past few months.   No changes today. Avoid Losartan due to hyperkalemia.     
BP high on admission.  BP better now.  Rx: Nifedipine 60 mg OD.   He reports not taking losartan at home.  1 kg UF on HD today.   Continue nifedipine as ordered.    
[facility]  Access: Right IJ PermCath   kg  Missed 1 HD tx prior to admission and admitted with hyperkalemia requiring urgent HD on 11/6/24.   Regular HD today.    
[facility]  Access: Right IJ PermCath   kg  Missed 1 HD tx prior to admission.  S/p urgent hemodialysis on admission, November 6, 2024, due to hyperkalemia.  Regular HD today.    
[facility]  Access: Right IJ PermCath   kg  Missed 1 HD tx prior to admission and admitted with hyperkalemia requiring urgent HD on 11/6/24.   S/p regular HD on 11/7/24 - Thurs.   Next HD is tomorrow.     
Continue nifedipine.  No longer on losartan.    
Continue sevelamer 800 mg TID for hyperphosphatemia.  Continue renal diet.  Check phos if not discharged.     
Continue sevelamer 800 mg TID for hyperphosphatemia.  Continue renal diet.  Phos is above goal.     
Continue sevelamer for hyperphosphatemia.  Continue renal diet.    
Defer to primary team.  
Defer to primary team.  GI consulted.   
Defer to primary team.  GI consulted.   
ESRD on HD TTS missed dialysis on Tuesday.    Results from last 7 days   Lab Units 11/07/24  0446 11/06/24  2157 11/06/24  1404   BUN mg/dL 96* 87* 127*   CREATININE mg/dL 7.45* 6.97* 9.56*   EGFR ml/min/1.73sq m 6 7 5     
ESRD on HD TTS missed dialysis on Tuesday.    Results from last 7 days   Lab Units 11/08/24  0520 11/07/24  0446 11/06/24  2157 11/06/24  1404   BUN mg/dL 57* 96* 87* 127*   CREATININE mg/dL 5.48* 7.45* 6.97* 9.56*   EGFR ml/min/1.73sq m 10 6 7 5     
ESRD on HD TTS missed dialysis on Tuesday.    Results from last 7 days   Lab Units 11/09/24  0755 11/08/24  0520 11/07/24  0446 11/06/24  2157 11/06/24  1404   BUN mg/dL 71* 57* 96* 87* 127*   CREATININE mg/dL 6.95* 5.48* 7.45* 6.97* 9.56*   EGFR ml/min/1.73sq m 7 10 6 7 5     
ESRD on HD TTS missed dialysis on Tuesday.  Due to hyperkalemia going for extra treatment this evening urgently and to resume TTS schedule tomorrow  
Fall with debility.  Lives alone.    Seen by PT/OT level 3 resource  
Fall with debility.  Lives alone.    Seen by PT/OT level 3 resource  
Fall with debility.  Lives alone.  Have PT/OT evaluate for discharge needs.  
Fall with debility.  Lives alone.  Have PT/OT evaluate for discharge needs.  
Hemoglobin below goal but stable.  8.1 today.   Currently not on SURINDER.    
Hemoglobin below goal but stable.  Hemoglobin 8.2 today.  Currently not on SURINDER.    
Hemoglobin below goal but stable. 8.0 today  Currently not on SURINDER.    
History of ESRD on HD TTS paroxysmal atrial fibrillation hypertension diabetes with PAD who presents to the hospital for fall  The patient has been having diarrhea and slipped on fecal matter falling.  He was on the floor for 2 hours and came here to the hospital where he was found to have hypertension and hyperkalemia.  Seen by nephrology.  Planning urgent HD today and to resume TTS tomorrow.  Received calcium gluconate along with albuterol 10 mg solution  
History of ESRD on HD TTS paroxysmal atrial fibrillation hypertension diabetes with PAD who presents to the hospital for fall  The patient has been having diarrhea and slipped on fecal matter falling.  He was on the floor for 2 hours and came here to the hospital where he was found to have hypertension and hyperkalemia.  Seen by nephrology.  Underwent urgent HD on admission and currently resumed TTS .  Received calcium gluconate along with albuterol 10 mg solution  Hyperkalemia has resolved  Disposition: Home today after dialysis.  He requested I do not call his family.    Results from last 7 days   Lab Units 11/09/24  0755 11/08/24  0520 11/07/24  0446 11/06/24  2157   POTASSIUM mmol/L 5.0 4.5 4.7 4.5     
History of ESRD on HD TTS paroxysmal atrial fibrillation hypertension diabetes with PAD who presents to the hospital for fall  The patient has been having diarrhea and slipped on fecal matter falling.  He was on the floor for 2 hours and came here to the hospital where he was found to have hypertension and hyperkalemia.  Seen by nephrology.  Underwent urgent HD on admission and currently resumed TTS .  Received calcium gluconate along with albuterol 10 mg solution  Hyperkalemia has resolved  Disposition: Home tomorrow after dialysis if stronger    Results from last 7 days   Lab Units 11/08/24  0520 11/07/24  0446 11/06/24  2157 11/06/24  1404   POTASSIUM mmol/L 4.5 4.7 4.5 6.9*     
History of ESRD on HD TTS paroxysmal atrial fibrillation hypertension diabetes with PAD who presents to the hospital for fall  The patient has been having diarrhea and slipped on fecal matter falling.  He was on the floor for 2 hours and came here to the hospital where he was found to have hypertension and hyperkalemia.  Seen by nephrology.  Underwent urgent HD on admission and resuming TTS .  Received calcium gluconate along with albuterol 10 mg solution    Results from last 7 days   Lab Units 11/07/24  0446 11/06/24  2157 11/06/24  1404   POTASSIUM mmol/L 4.7 4.5 6.9*     
K 6.9 on admission.  Improved after medical management and urgent HD.  K 5.0 today.  Plan for 2K bath today.    
K 6.9 on admission.  Improved after medical management and urgent HD.  K is stable today at 4.7.   2K bath on HD.     
K 6.9 on admission.  Improved after medical management and urgent HD.  K stable at 4.5     
Lab Results   Component Value Date    EGFR 10 11/08/2024    EGFR 6 11/07/2024    EGFR 7 11/06/2024    CREATININE 5.48 (H) 11/08/2024    CREATININE 7.45 (H) 11/07/2024    CREATININE 6.97 (H) 11/06/2024     
Lab Results   Component Value Date    EGFR 7 11/09/2024    EGFR 10 11/08/2024    EGFR 6 11/07/2024    CREATININE 6.95 (H) 11/09/2024    CREATININE 5.48 (H) 11/08/2024    CREATININE 7.45 (H) 11/07/2024     
Lab Results   Component Value Date    HGBA1C 5.5 10/08/2024     Results from last 7 days   Lab Units 11/06/24  1404   GLUCOSE RANDOM mg/dL 104     History of diabetes mellitus no longer on any agents.  Monitor with a.m. labs.  
Lab Results   Component Value Date    HGBA1C 5.5 10/08/2024     Results from last 7 days   Lab Units 11/07/24  0446 11/06/24  2157 11/06/24  1404   GLUCOSE RANDOM mg/dL 88 117 104     History of diabetes mellitus no longer on any agents.  Stable with a.m. labs.  
Lab Results   Component Value Date    HGBA1C 5.5 10/08/2024     Results from last 7 days   Lab Units 11/08/24  0520 11/07/24  0446 11/06/24  2157 11/06/24  1404   GLUCOSE RANDOM mg/dL 90 88 117 104     History of diabetes mellitus no longer on any agents.  Stable with a.m. labs.  
Lab Results   Component Value Date    HGBA1C 5.5 10/08/2024     Results from last 7 days   Lab Units 11/09/24  0755 11/08/24  0520 11/07/24  0446 11/06/24  2157 11/06/24  1404   GLUCOSE RANDOM mg/dL 86 90 88 117 104     History of diabetes mellitus no longer on any agents.  Stable with a.m. labs.  
Paroxysmal atrial fibrillation patient reports no longer on Eliquis.  Continue aspirin.  
Paroxysmal atrial fibrillation patient reports no longer on Eliquis.  Continue aspirin.  Did have rapid atrial fibrillation last night short-lived.  Converted with 1 dose of IV diltiazem  
Paroxysmal atrial fibrillation patient reports no longer on Eliquis.  Continue aspirin.  Did have rapid atrial fibrillation last night short-lived.  Converted with 1 dose of IV diltiazem  
Paroxysmal atrial fibrillation patient reports no longer on Eliquis.  Continue aspirin.  Did have rapid atrial fibrillation short-lived.  Converted with 1 dose of IV diltiazem  
Patient also reports melanotic stools.    Seen by GI offered endoscopy, patient declined    Results from last 7 days   Lab Units 11/08/24  0520 11/07/24  0446 11/06/24  1404   HEMOGLOBIN g/dL 8.1* 8.2* 8.6*     
Patient also reports melanotic stools.    Seen by GI offered endoscopy, patient declined    Results from last 7 days   Lab Units 11/09/24  0755 11/08/24  0520 11/07/24  0446 11/06/24  1404   HEMOGLOBIN g/dL 8.0* 8.1* 8.2* 8.6*     
Patient also reports melanotic stools.  Follow-up on stool occult and consulted gastroenterology.    Results from last 7 days   Lab Units 11/07/24  0446 11/06/24  1404   HEMOGLOBIN g/dL 8.2* 8.6*     
Patient also reports melanotic stools.  Will check stool occult and consult gastroenterology.    Results from last 7 days   Lab Units 11/06/24  1404   HEMOGLOBIN g/dL 8.6*     
Prior to admission on nifedipine.  Continue.  No longer on losartan.  Would have needed to be held anyway due to hyperkalemia.  
normal...

## 2024-11-09 NOTE — DISCHARGE SUMMARY
Discharge Summary - Hospitalist   Name: Naresh Carpio 64 y.o. male I MRN: [id number]  Unit/Bed#: 4 [facility] 420-01 I Date of Admission: 11/6/2024   Date of Service: 11/9/2024 I Hospital Day: 2    Assessment & Plan  Hyperkalemia  History of ESRD on HD TTS paroxysmal atrial fibrillation hypertension diabetes with PAD who presents to the hospital for fall  The patient has been having diarrhea and slipped on fecal matter falling.  He was on the floor for 2 hours and came here to the hospital where he was found to have hypertension and hyperkalemia.  Seen by nephrology.  Underwent urgent HD on admission and currently resumed TTS .  Received calcium gluconate along with albuterol 10 mg solution  Hyperkalemia has resolved  Disposition: Home today after dialysis.  He requested I do not call his family.    Results from last 7 days   Lab Units 11/09/24  0755 11/08/24 0520 11/07/24 0446 11/06/24  2157   POTASSIUM mmol/L 5.0 4.5 4.7 4.5     Anemia due to chronic kidney disease, on chronic dialysis (HCC)  Patient also reports melanotic stools.    Seen by GI offered endoscopy, patient declined    Results from last 7 days   Lab Units 11/09/24  0755 11/08/24  0520 11/07/24 0446 11/06/24  1404   HEMOGLOBIN g/dL 8.0* 8.1* 8.2* 8.6*     Hypertension  Continue nifedipine.  No longer on losartan.    Diabetes mellitus type 2 with peripheral artery disease (HCC)  Lab Results   Component Value Date    HGBA1C 5.5 10/08/2024     Results from last 7 days   Lab Units 11/09/24  0755 11/08/24  0520 11/07/24 0446 11/06/24 2157 11/06/24  1404   GLUCOSE RANDOM mg/dL 86 90 88 117 104     History of diabetes mellitus no longer on any agents.  Stable with a.m. labs.  ESRD (end stage renal disease) (HCC)  ESRD on HD TTS missed dialysis on Tuesday.    Results from last 7 days   Lab Units 11/09/24  0755 11/08/24  0520 11/07/24 0446 11/06/24 2157 11/06/24  1404   BUN mg/dL 71* 57* 96* 87* 127*   CREATININE mg/dL 6.95* 5.48* 7.45* 6.97* 9.56*   EGFR  ml/min/1.73sq m 7 10 6 7 5     Atrial fibrillation (HCC)  Paroxysmal atrial fibrillation patient reports no longer on Eliquis.  Continue aspirin.  Did have rapid atrial fibrillation short-lived.  Converted with 1 dose of IV diltiazem  Fall  Fall with debility.  Lives alone.    Seen by PT/OT level 3 resource  Chronic kidney disease-mineral and bone disorder  Lab Results   Component Value Date    EGFR 7 11/09/2024    EGFR 10 11/08/2024    EGFR 6 11/07/2024    CREATININE 6.95 (H) 11/09/2024    CREATININE 5.48 (H) 11/08/2024    CREATININE 7.45 (H) 11/07/2024     Hyperphosphatemia    Melena        Medical Problems       Resolved Problems  Date Reviewed: 11/9/2024   None        Discharging Physician / Practitioner: Freddy Carrasquillo DO  PCP: Jeffrey Jackson  Admission Date:   Admission Orders (From admission, onward)       Ordered        11/07/24 1218  INPATIENT ADMISSION  Once            11/06/24 1518  Place in Observation  Once                        Discharge Date: 11/09/24    Consultations During Hospital Stay:  IP CONSULT TO NEPHROLOGY  IP CONSULT TO GASTROENTEROLOGY     Procedures Performed:   * No surgery found *     Images:   No results found.    Lab Results: I have reviewed the following results:  Results from last 7 days   Lab Units 11/09/24  0755 11/08/24 0520 11/07/24 0446 11/06/24  1404   WBC Thousand/uL 5.97 6.28 6.23 6.71   HEMOGLOBIN g/dL 8.0* 8.1* 8.2* 8.6*   HEMATOCRIT % 24.7* 25.3* 25.4* 27.3*   MCV fL 94 94 93 96   PLATELETS Thousands/uL 203 193 195 192   INR   --   --  1.12  --      Results from last 7 days   Lab Units 11/09/24  0755 11/08/24  0520 11/07/24 0446 11/06/24  2157 11/06/24  1404   SODIUM mmol/L 131* 132* 134* 134* 132*   POTASSIUM mmol/L 5.0 4.5 4.7 4.5 6.9*   CHLORIDE mmol/L 94* 95* 96 94* 96   CO2 mmol/L 24 25 23 21 18*   BUN mg/dL 71* 57* 96* 87* 127*   CREATININE mg/dL 6.95* 5.48* 7.45* 6.97* 9.56*   CALCIUM mg/dL 7.2* 7.7* 8.0* 8.3* 7.9*   ALBUMIN g/dL  --   --   --   --  3.9   TOTAL  BILIRUBIN mg/dL  --   --   --   --  0.41   ALK PHOS U/L  --   --   --   --  116*   ALT U/L  --   --   --   --  70*   AST U/L  --   --   --   --  37   EGFR ml/min/1.73sq m 7 10 6 7 5   GLUCOSE RANDOM mg/dL 86 90 88 117 104     Results from last 7 days   Lab Units 11/06/24  1404   CK TOTAL U/L 101                  Results from last 7 days   Lab Units 11/06/24  2147   POC GLUCOSE mg/dl 129                       Results from last 7 days   Lab Units 11/08/24  0547   COLOR UA  Yellow   CLARITY UA  Clear   SPEC GRAV UA  1.025   PH UA  7.0   LEUKOCYTES UA  Trace*   NITRITE UA  Negative   GLUCOSE UA mg/dl 70 (7/100%)*   KETONES UA mg/dl 10 (1+)*   BLOOD UA  Small*      Results from last 7 days   Lab Units 11/08/24  0547   RBC UA /hpf 4-10*   WBC UA /hpf 10-20*   EPITHELIAL CELLS WET PREP /hpf Occasional   BACTERIA UA /hpf None Seen      Results from last 7 days   Lab Units 11/08/24  0547 11/06/24  2315   URINE CULTURE  Culture too young- will reincubate  --    INFLUENZA A PCR   --  Negative     Results from last 7 days   Lab Units 11/06/24  2315   SARS-COV-2  Negative   INFLUENZA A PCR  Negative   INFLUENZA B PCR  Negative   RSV PCR  Negative       Incidental Findings:      Test Results Pending at Discharge (will require follow up):   Pending Labs       Order Current Status    Urine culture Preliminary result           Reason for Admission:   Fall (Arrives BLS, reported that he has been having diarrhea, and went to bathroom, didn't quite make it and slipped on fecal matter and fell forward. No LOC, no hitting head. Was on the floor for 2 hrs, before calling 911. He is awake, alert. Co of back pain and on dialysis. )    Hospital Course:   Naresh Carpio is a 64 y.o. male patient who originally presented to the hospital on 11/6/2024 due to fall.  The patient was having diarrhea at home.  He slipped on his diarrhea falling and ended up here at the hospital.  He was found to have hyperkalemia and received urgent dialysis upon  "arrival because he missed his Tuesday session.  His hyperkalemia has resolved.  He has been seen by PT/OT with recommendation for level 3 resource.  He is being discharged home today.  He requested to not call his family.    Please see above list of diagnoses and related plan for additional information.     Condition at Discharge: stable     Discharge Day Visit / Exam:   Subjective: Patient seen and examined on dialysis.  No chest pain or shortness of breath.  No diarrhea.    Vitals: Blood Pressure: 170/66 (11/09/24 1100)  Pulse: 79 (11/09/24 1100)  Temperature: 97.8 °F (36.6 °C) (11/09/24 0745)  Temp Source: Tympanic (11/09/24 0745)  Respirations: 16 (11/09/24 1100)  Height: 6' 1\" (185.4 cm) (11/06/24 1734)  Weight - Scale: 106 kg (233 lb 14.5 oz) (11/07/24 0555)  SpO2: 97 % (11/08/24 2208)    Exam:   Physical Exam  Vitals reviewed.   Constitutional:       General: He is not in acute distress.  HENT:      Head: Atraumatic.   Cardiovascular:      Rate and Rhythm: Regular rhythm.      Heart sounds: Normal heart sounds.   Pulmonary:      Effort: Pulmonary effort is normal.      Breath sounds: Decreased breath sounds present. No wheezing.   Abdominal:      General: Bowel sounds are normal.      Palpations: Abdomen is soft.      Tenderness: There is no abdominal tenderness.   Musculoskeletal:      Right lower leg: Edema present.   Skin:     General: Skin is warm.   Neurological:      General: No focal deficit present.      Mental Status: He is alert and oriented to person, place, and time.      Motor: No weakness.   Psychiatric:         Mood and Affect: Mood normal.       Discussion with Family: Patient requested I do not call the sister or any family    Discharge instructions/Information to patient and family:   See after visit summary for information provided to patient and family.      Provisions for Follow-Up Care:  See after visit summary for information related to follow-up care and any pertinent home health " orders.      Mobility at time of Discharge:  Basic Mobility Inpatient Raw Score: 23  JH-HLM Goal: 7: Walk 25 feet or more  JH-HLM Achieved: 7: Walk 25 feet or more  JH-HLM Goal achieved. Continue to encourage appropriate mobility.    Disposition:   Home    Planned Readmission: No     Discharge Medications:  See after visit summary for reconciled discharge medications provided to patient and family.      Administrative Statements   I spent 35 minutes discharging the patient. This time was spent on the day of discharge. I had direct contact with the patient on the day of discharge. Greater than 50% of the total time was spent examining patient, answering all patient questions, arranging and discussing plan of care with patient as well as directly providing post-discharge instructions.  Additional time then spent on discharge activities.    **Please Note: This note may have been constructed using a voice recognition system**

## 2024-11-09 NOTE — HEMODIALYSIS
Post-Dialysis RN Treatment Note    Blood Pressure:  Pre 143/67 mm/Hg  Post 184/72 mmHg   EDW  108 kg    Weight:  Pre 106.4 kg   Post 104.1 kg   Mode of weight measurement: Standing Scale   Volume Removed  2000 ml    Treatment duration 210 minutes    NS given  No    Treatment shortened? No   Medications given during Rx None Reported   Estimated Kt/V  None Reported   Access type: Permacath/TDC   Access Issues: Yes, describe: Lines reversed due to elevated arterial pressures     Report called to primary nurse   Yes Robinson GORDON RN

## 2024-11-09 NOTE — CASE MANAGEMENT
Case Management Discharge Planning Note    Patient name Naresh Carpio  Location [address] [place] [address] 420/4 [place] 420-* MRN [id number]  : 1960 Date 2024       Current Admission Date: 2024  Current Admission Diagnosis:Hyperkalemia   Patient Active Problem List    Diagnosis Date Noted Date Diagnosed    Hyperphosphatemia 2024     Fall 2024     Melena 2024     Atrial fibrillation ([facility])      ESRD (end stage renal disease) ([facility]) 10/25/2024     Nausea 10/20/2024     Hyponatremia 10/19/2024     Chronic kidney disease-mineral and bone disorder 10/14/2024     Falls 10/08/2024     PVD (peripheral vascular disease) ([facility]) 10/08/2024     Closed fracture of right pelvis ([facility]) 2024     Bilateral sacral fracture, closed ([facility]) 2024     Hyperkalemia 2024     Difficulty with speech 2024     History of amputation of hallux ([facility]) 2024     Hypertensive urgency 2024     Facial cellulitis 2024     Constipation 2023     Bradycardia 2023     Cellulitis of right foot      Polymicrobial bacterial infection 2023     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 mellitus, with necrosis of bone ([facility])      Acquired deformity of foot, right      Charcot's joint      Subacute osteomyelitis of right foot ([facility])      Open wound of right foot 2023     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 mellitus, with bone involvement without evidence of necrosis ([facility])      Diabetic ulcer of left midfoot associated with type 2 diabetes mellitus, limited to breakdown of skin ([facility])      Diabetic polyneuropathy associated with type 2 diabetes mellitus ([facility])      Diabetes mellitus type 2 with peripheral artery disease ([facility])      History of amputation of lesser toe of left foot ([facility])      Onychomycosis      Status post fall 2023     Traumatic rhabdomyolysis ([facility]) 2023     Leukocytosis 2023     Osteoarthritis of left hip  "  Subjective:     Hari Anguiano is a 61 y.o. male who presents for Leg Pain (X2 days, right lower leg area, bump redness, warm to the touch and painful )      States it started as a small pimple or sore yesterday. No itch. Pain with flexion of leg. Pain 2/10. No numbness.          Leg Pain  This is a new problem. The current episode started yesterday. Pertinent negatives include no fever.       Past Medical History:   Diagnosis Date    H/O hernia repair        History reviewed. No pertinent surgical history.    Social History     Socioeconomic History    Marital status:      Spouse name: Not on file    Number of children: Not on file    Years of education: Not on file    Highest education level: Not on file   Occupational History    Not on file   Tobacco Use    Smoking status: Never    Smokeless tobacco: Never   Vaping Use    Vaping Use: Never used   Substance and Sexual Activity    Alcohol use: Not Currently    Drug use: Never    Sexual activity: Not on file   Other Topics Concern    Not on file   Social History Narrative    Not on file     Social Determinants of Health     Financial Resource Strain: Not on file   Food Insecurity: Not on file   Transportation Needs: Not on file   Physical Activity: Not on file   Stress: Not on file   Social Connections: Not on file   Intimate Partner Violence: Not on file   Housing Stability: Not on file        History reviewed. No pertinent family history.     No Known Allergies    Review of Systems   Constitutional:  Negative for fever.   All other systems reviewed and are negative.       Objective:   /64   Pulse 83   Temp 36.7 °C (98 °F) (Temporal)   Resp 18   Ht 1.778 m (5' 10\")   Wt 89.8 kg (198 lb)   SpO2 98%   BMI 28.41 kg/m²     Physical Exam  Vitals reviewed.   Cardiovascular:      Rate and Rhythm: Normal rate.   Pulmonary:      Effort: Pulmonary effort is normal.   Skin:     General: Skin is warm and dry.      Findings: Erythema present.      " 07/27/2022     Severe Left Orchalgia 07/26/2022     Right shoulder pain 07/26/2022     Left hip pain 07/06/2022     Hemodialysis status (HCC)      Hypervolemia      Anemia due to chronic kidney disease, on chronic dialysis (HCC) 01/21/2022     Type 2 diabetes mellitus, without long-term current use of insulin (HCC)      Hypertension      History of TIA (transient ischemic attack)      T10 vertebral fracture (HCC)        LOS (days): 2  Geometric Mean LOS (GMLOS) (days): 3.6  Days to GMLOS:1.6     OBJECTIVE:  Risk of Unplanned Readmission Score: 30.46       Current admission status: Inpatient   Preferred Pharmacy:   [id number] - [place], [place] - [address] [address]  [address] [address]  Phone: [phone] Fax: [phone]    Primary Care Provider: Jeffrey Jackson    Primary Insurance: MEDICARE  Secondary Insurance:     DISCHARGE DETAILS:    Discharge planning discussed with:: Patient  Freedom of Choice: Yes    Comments - Freedom of Choice: Patient's preference is to return home to his sister's apartment.  Patient stated that he is not sure his sister is home but refused to allow SW to call his sister to confirm.  Patient requested a [facility] transport.  Transport was requested in [facility] and pickup is scheduled for 1600 by [facility].  Attending and nursing notified of pickup time.      CM contacted family/caregiver?: No- see comments (Patient refused call to family)  Were Treatment Team discharge recommendations reviewed with patient/caregiver?: Yes  Did patient/caregiver verbalize understanding of patient care needs?: Yes  Were patient/caregiver advised of the risks associated with not following Treatment Team discharge recommendations?: Yes    Contacts  Patient Contacts: Alicja Carpio (sister)  Relationship to Patient:: Family  Contact Method: Phone  Phone Number: [phone]    Requested Home Health Care         Is the patient interested in HHC at discharge?: No    DME Referral  Comments: 8 x 4 cm area of erythema to lateral right calf, central escoration .25 cm and induration of 1.5 cm.    Neurological:      Mental Status: He is alert and oriented to person, place, and time.   Psychiatric:         Behavior: Behavior normal.         Assessment/Plan:   1. Cellulitis of right lower extremity  -Bactrim  -Clean with soap and water.  -Warm compress.  -OTC Tylenol or ibuprofen for pain.   -Rest and elevate leg    Follow up for increasing signs of infection (redness, red streaking, accumulation of pus, pain, increased swelling, chills or fever).     -Stable vitals. No indication for Incision and Drainage at this time, as there's no central fluctuance. Advised pt to closely monitor leg, and f/u urgently for increased signs of infection.     Differential diagnosis, natural history, supportive care, and indications for immediate follow-up discussed.   Provided  Referral made for DME?: No    Other Referral/Resources/Interventions Provided:  Interventions: Transportation    Would you like to participate in our [facility] service program?  : No - Declined    Treatment Team Recommendation: Home  Discharge Destination Plan:: Home  Transport at Discharge : Wheelchair van     Number/Name of Dispatcher: [facility] via [employer]  Transported by (Company and Unit #): [employer]  ETA of Transport (Date): 11/09/24  ETA of Transport (Time): 1600         IMM Given (Date):: 11/07/24

## 2024-11-09 NOTE — PLAN OF CARE
Pt. On HD treatment for 3.5 hours with a UF goal of 1.5 L as tolerated. Pt. On a 2 k 2.5 basim bath for a potassium of 4.5 on 11/08/24, labs pending. Plan of care discussed with Dr. Mack.  Problem: METABOLIC, FLUID AND ELECTROLYTES - ADULT  Goal: Electrolytes maintained within normal limits  Description: INTERVENTIONS:  - Monitor labs and assess patient for signs and symptoms of electrolyte imbalances  - Administer electrolyte replacement as ordered  - Monitor response to electrolyte replacements, including repeat lab results as appropriate  - Instruct patient on fluid and nutrition as appropriate  Outcome: Progressing  Goal: Fluid balance maintained  Description: INTERVENTIONS:  - Monitor labs   - Monitor I/O and WT  - Instruct patient on fluid and nutrition as appropriate  - Assess for signs & symptoms of volume excess or deficit  Outcome: Progressing

## 2024-11-11 LAB — BACTERIA UR CULT: NORMAL

## 2024-11-18 NOTE — PLAN OF CARE
Problem: Prexisting or High Potential for Compromised Skin Integrity  Goal: Skin integrity is maintained or improved  Description: INTERVENTIONS:  - Identify patients at risk for skin breakdown  - Assess and monitor skin integrity  - Assess and monitor nutrition and hydration status  - Monitor labs   - Assess for incontinence   - Turn and reposition patient  - Assist with mobility/ambulation  - Relieve pressure over bony prominences  - Avoid friction and shearing  - Provide appropriate hygiene as needed including keeping skin clean and dry  - Evaluate need for skin moisturizer/barrier cream  - Collaborate with interdisciplinary team   - Patient/family teaching  - Consider wound care consult   Outcome: Progressing     Problem: MOBILITY - ADULT  Goal: Maintain or return to baseline ADL function  Description: INTERVENTIONS:  -  Assess patient's ability to carry out ADLs; assess patient's baseline for ADL function and identify physical deficits which impact ability to perform ADLs (bathing, care of mouth/teeth, toileting, grooming, dressing, etc )  - Assess/evaluate cause of self-care deficits   - Assess range of motion  - Assess patient's mobility; develop plan if impaired  - Assess patient's need for assistive devices and provide as appropriate  - Encourage maximum independence but intervene and supervise when necessary  - Involve family in performance of ADLs  - Assess for home care needs following discharge   - Consider OT consult to assist with ADL evaluation and planning for discharge  - Provide patient education as appropriate  Outcome: Progressing  Goal: Maintains/Returns to pre admission functional level  Description: INTERVENTIONS:  - Perform BMAT or MOVE assessment daily    - Set and communicate daily mobility goal to care team and patient/family/caregiver     - Collaborate with rehabilitation services on mobility goals if consulted  - Out of bed for toileting  - Record patient progress and toleration of activity level   Outcome: Progressing     Problem: Potential for Falls  Goal: Patient will remain free of falls  Description: INTERVENTIONS:  - Educate patient/family on patient safety including physical limitations  - Instruct patient to call for assistance with activity   - Consult OT/PT to assist with strengthening/mobility   - Keep Call bell within reach  - Keep bed low and locked with side rails adjusted as appropriate  - Keep care items and personal belongings within reach  - Initiate and maintain comfort rounds  - Make Fall Risk Sign visible to staff  - Apply yellow socks and bracelet for high fall risk patients  - Consider moving patient to room near nurses station  Outcome: Progressing Name band;

## 2024-11-19 ENCOUNTER — APPOINTMENT (INPATIENT)
Dept: DIALYSIS | Facility: HOSPITAL | Age: 64
DRG: 291 | End: 2024-11-19
Payer: MEDICARE

## 2024-11-19 ENCOUNTER — HOSPITAL ENCOUNTER (INPATIENT)
Facility: HOSPITAL | Age: 64
LOS: 13 days | Discharge: HOME | DRG: 291 | End: 2024-12-02
Attending: EMERGENCY MEDICINE | Admitting: FAMILY MEDICINE
Payer: MEDICARE

## 2024-11-19 ENCOUNTER — APPOINTMENT (EMERGENCY)
Dept: RADIOLOGY | Facility: HOSPITAL | Age: 64
DRG: 291 | End: 2024-11-19
Payer: MEDICARE

## 2024-11-19 DIAGNOSIS — I73.9 PAD (PERIPHERAL ARTERY DISEASE) (HCC): ICD-10-CM

## 2024-11-19 DIAGNOSIS — I16.0 HYPERTENSIVE URGENCY: ICD-10-CM

## 2024-11-19 DIAGNOSIS — Z91.158 NON-COMPLIANCE WITH RENAL DIALYSIS (HCC): ICD-10-CM

## 2024-11-19 DIAGNOSIS — N18.6 ESRD (END STAGE RENAL DISEASE) (HCC): ICD-10-CM

## 2024-11-19 DIAGNOSIS — J96.01 ACUTE HYPOXIC RESPIRATORY FAILURE (HCC): Primary | ICD-10-CM

## 2024-11-19 DIAGNOSIS — J81.1 PULMONARY EDEMA: ICD-10-CM

## 2024-11-19 DIAGNOSIS — R05.1 ACUTE COUGH: ICD-10-CM

## 2024-11-19 DIAGNOSIS — T14.8XXA CHRONIC WOUND: ICD-10-CM

## 2024-11-19 DIAGNOSIS — I48.0 PAROXYSMAL ATRIAL FIBRILLATION (HCC): ICD-10-CM

## 2024-11-19 DIAGNOSIS — M14.60 CHARCOT'S JOINT: ICD-10-CM

## 2024-11-19 DIAGNOSIS — E11.42 DIABETIC POLYNEUROPATHY ASSOCIATED WITH TYPE 2 DIABETES MELLITUS (HCC): ICD-10-CM

## 2024-11-19 PROBLEM — R79.89 ELEVATED TROPONIN: Status: ACTIVE | Noted: 2024-11-19

## 2024-11-19 PROBLEM — J90 PLEURAL EFFUSION: Status: ACTIVE | Noted: 2024-11-19

## 2024-11-19 PROBLEM — I50.33 ACUTE ON CHRONIC DIASTOLIC (CONGESTIVE) HEART FAILURE (HCC): Status: ACTIVE | Noted: 2024-11-19

## 2024-11-19 LAB
4HR DELTA HS TROPONIN: 48 NG/L
ALBUMIN SERPL BCG-MCNC: 3.9 G/DL (ref 3.5–5)
ALP SERPL-CCNC: 111 U/L (ref 34–104)
ALT SERPL W P-5'-P-CCNC: 24 U/L (ref 7–52)
ANION GAP SERPL CALCULATED.3IONS-SCNC: 13 MMOL/L (ref 4–13)
AST SERPL W P-5'-P-CCNC: 16 U/L (ref 13–39)
BASOPHILS # BLD AUTO: 0.04 THOUSANDS/ÂΜL (ref 0–0.1)
BASOPHILS NFR BLD AUTO: 0 % (ref 0–1)
BILIRUB SERPL-MCNC: 0.38 MG/DL (ref 0.2–1)
BUN SERPL-MCNC: 74 MG/DL (ref 5–25)
CALCIUM SERPL-MCNC: 9.1 MG/DL (ref 8.4–10.2)
CARDIAC TROPONIN I PNL SERPL HS: 311 NG/L (ref ?–50)
CARDIAC TROPONIN I PNL SERPL HS: 359 NG/L (ref ?–50)
CHLORIDE SERPL-SCNC: 100 MMOL/L (ref 96–108)
CO2 SERPL-SCNC: 23 MMOL/L (ref 21–32)
CREAT SERPL-MCNC: 9.08 MG/DL (ref 0.6–1.3)
EOSINOPHIL # BLD AUTO: 0.38 THOUSAND/ÂΜL (ref 0–0.61)
EOSINOPHIL NFR BLD AUTO: 4 % (ref 0–6)
ERYTHROCYTE [DISTWIDTH] IN BLOOD BY AUTOMATED COUNT: 15.5 % (ref 11.6–15.1)
GFR SERPL CREATININE-BSD FRML MDRD: 5 ML/MIN/1.73SQ M
GLUCOSE SERPL-MCNC: 145 MG/DL (ref 65–140)
GLUCOSE SERPL-MCNC: 155 MG/DL (ref 65–140)
GLUCOSE SERPL-MCNC: 97 MG/DL (ref 65–140)
HCT VFR BLD AUTO: 26.5 % (ref 36.5–49.3)
HGB BLD-MCNC: 8.2 G/DL (ref 12–17)
IMM GRANULOCYTES # BLD AUTO: 0.06 THOUSAND/UL (ref 0–0.2)
IMM GRANULOCYTES NFR BLD AUTO: 1 % (ref 0–2)
LYMPHOCYTES # BLD AUTO: 0.81 THOUSANDS/ÂΜL (ref 0.6–4.47)
LYMPHOCYTES NFR BLD AUTO: 9 % (ref 14–44)
MCH RBC QN AUTO: 30.3 PG (ref 26.8–34.3)
MCHC RBC AUTO-ENTMCNC: 30.9 G/DL (ref 31.4–37.4)
MCV RBC AUTO: 98 FL (ref 82–98)
MONOCYTES # BLD AUTO: 0.76 THOUSAND/ÂΜL (ref 0.17–1.22)
MONOCYTES NFR BLD AUTO: 8 % (ref 4–12)
NEUTROPHILS # BLD AUTO: 7.47 THOUSANDS/ÂΜL (ref 1.85–7.62)
NEUTS SEG NFR BLD AUTO: 78 % (ref 43–75)
NRBC BLD AUTO-RTO: 0 /100 WBCS
PLATELET # BLD AUTO: 270 THOUSANDS/UL (ref 149–390)
PMV BLD AUTO: 9.8 FL (ref 8.9–12.7)
POTASSIUM SERPL-SCNC: 4.9 MMOL/L (ref 3.5–5.3)
PROT SERPL-MCNC: 7.1 G/DL (ref 6.4–8.4)
RBC # BLD AUTO: 2.71 MILLION/UL (ref 3.88–5.62)
SODIUM SERPL-SCNC: 136 MMOL/L (ref 135–147)
WBC # BLD AUTO: 9.52 THOUSAND/UL (ref 4.31–10.16)

## 2024-11-19 PROCEDURE — 71045 X-RAY EXAM CHEST 1 VIEW: CPT

## 2024-11-19 PROCEDURE — 36415 COLL VENOUS BLD VENIPUNCTURE: CPT | Performed by: EMERGENCY MEDICINE

## 2024-11-19 PROCEDURE — 84484 ASSAY OF TROPONIN QUANT: CPT | Performed by: FAMILY MEDICINE

## 2024-11-19 PROCEDURE — 87081 CULTURE SCREEN ONLY: CPT | Performed by: FAMILY MEDICINE

## 2024-11-19 PROCEDURE — 85025 COMPLETE CBC W/AUTO DIFF WBC: CPT | Performed by: EMERGENCY MEDICINE

## 2024-11-19 PROCEDURE — 87147 CULTURE TYPE IMMUNOLOGIC: CPT | Performed by: FAMILY MEDICINE

## 2024-11-19 PROCEDURE — 80053 COMPREHEN METABOLIC PANEL: CPT | Performed by: EMERGENCY MEDICINE

## 2024-11-19 PROCEDURE — 82948 REAGENT STRIP/BLOOD GLUCOSE: CPT

## 2024-11-19 PROCEDURE — 84484 ASSAY OF TROPONIN QUANT: CPT | Performed by: EMERGENCY MEDICINE

## 2024-11-19 PROCEDURE — 94760 N-INVAS EAR/PLS OXIMETRY 1: CPT

## 2024-11-19 PROCEDURE — NC001 PR NO CHARGE: Performed by: INTERNAL MEDICINE

## 2024-11-19 PROCEDURE — 99291 CRITICAL CARE FIRST HOUR: CPT | Performed by: EMERGENCY MEDICINE

## 2024-11-19 PROCEDURE — 93005 ELECTROCARDIOGRAM TRACING: CPT

## 2024-11-19 PROCEDURE — 99285 EMERGENCY DEPT VISIT HI MDM: CPT | Performed by: INTERNAL MEDICINE

## 2024-11-19 PROCEDURE — 99223 1ST HOSP IP/OBS HIGH 75: CPT | Performed by: FAMILY MEDICINE

## 2024-11-19 PROCEDURE — 99285 EMERGENCY DEPT VISIT HI MDM: CPT

## 2024-11-19 RX ORDER — POLYETHYLENE GLYCOL 3350 17 G/17G
17 POWDER, FOR SOLUTION ORAL DAILY PRN
Status: DISCONTINUED | OUTPATIENT
Start: 2024-11-19 | End: 2024-12-02 | Stop reason: HOSPADM

## 2024-11-19 RX ORDER — ALLOPURINOL 100 MG/1
100 TABLET ORAL DAILY
Status: DISCONTINUED | OUTPATIENT
Start: 2024-11-19 | End: 2024-12-02 | Stop reason: HOSPADM

## 2024-11-19 RX ORDER — ASPIRIN 81 MG/1
81 TABLET, CHEWABLE ORAL DAILY
Status: DISCONTINUED | OUTPATIENT
Start: 2024-11-19 | End: 2024-12-02 | Stop reason: HOSPADM

## 2024-11-19 RX ORDER — SODIUM HYPOCHLORITE 1.25 MG/ML
SOLUTION TOPICAL DAILY
Status: DISCONTINUED | OUTPATIENT
Start: 2024-11-20 | End: 2024-11-19

## 2024-11-19 RX ORDER — GABAPENTIN 100 MG/1
200 CAPSULE ORAL 2 TIMES DAILY
Status: DISCONTINUED | OUTPATIENT
Start: 2024-11-19 | End: 2024-12-02 | Stop reason: HOSPADM

## 2024-11-19 RX ORDER — INSULIN LISPRO 100 [IU]/ML
1-6 INJECTION, SOLUTION INTRAVENOUS; SUBCUTANEOUS
Status: DISCONTINUED | OUTPATIENT
Start: 2024-11-19 | End: 2024-12-02 | Stop reason: HOSPADM

## 2024-11-19 RX ORDER — ATORVASTATIN CALCIUM 80 MG/1
80 TABLET, FILM COATED ORAL DAILY
Status: DISCONTINUED | OUTPATIENT
Start: 2024-11-19 | End: 2024-12-02 | Stop reason: HOSPADM

## 2024-11-19 RX ORDER — HEPARIN SODIUM 5000 [USP'U]/ML
5000 INJECTION, SOLUTION INTRAVENOUS; SUBCUTANEOUS EVERY 12 HOURS SCHEDULED
Status: DISCONTINUED | OUTPATIENT
Start: 2024-11-19 | End: 2024-12-02 | Stop reason: HOSPADM

## 2024-11-19 RX ORDER — NIFEDIPINE 30 MG/1
60 TABLET, EXTENDED RELEASE ORAL
Status: DISCONTINUED | OUTPATIENT
Start: 2024-11-19 | End: 2024-12-01

## 2024-11-19 RX ADMIN — ATORVASTATIN CALCIUM 80 MG: 80 TABLET, FILM COATED ORAL at 18:30

## 2024-11-19 RX ADMIN — HEPARIN SODIUM 5000 UNITS: 5000 INJECTION, SOLUTION INTRAVENOUS; SUBCUTANEOUS at 21:42

## 2024-11-19 RX ADMIN — ASPIRIN 81 MG CHEWABLE TABLET 81 MG: 81 TABLET CHEWABLE at 18:29

## 2024-11-19 RX ADMIN — NIFEDIPINE 60 MG: 30 TABLET, EXTENDED RELEASE ORAL at 18:42

## 2024-11-19 RX ADMIN — ALLOPURINOL 100 MG: 100 TABLET ORAL at 18:30

## 2024-11-19 RX ADMIN — GABAPENTIN 200 MG: 100 CAPSULE ORAL at 18:30

## 2024-11-19 NOTE — ASSESSMENT & PLAN NOTE
Lab Results   Component Value Date    EGFR 5 11/19/2024    EGFR 7 11/09/2024    EGFR 10 11/08/2024    CREATININE 9.08 (H) 11/19/2024    CREATININE 6.95 (H) 11/09/2024    CREATININE 5.48 (H) 11/08/2024

## 2024-11-19 NOTE — ASSESSMENT & PLAN NOTE
Patient was seen and examined in the emergency department and will be admitted to the hospital for further evaluation and management.  Patient presenting with shortness of breath found to have evidence of acute hypoxic respiratory failure. Chest x-ray showing evidence of pulmonary edema with bilateral pleural effusion secondary to volume overload from acute on chronic diastolic congestive heart failure brought on by the missed dialysis session this weekend.  Fluid management with hemodialysis per nephrology.  Continue supplemental oxygen therapy as needed and wean as tolerated.

## 2024-11-19 NOTE — ASSESSMENT & PLAN NOTE
"Lab Results   Component Value Date    HGBA1C 5.5 10/08/2024       No results for input(s): \"POCGLU\" in the last 72 hours.    Blood Sugar Average: Last 72 hrs:    Patient will be placed on an insulin sliding scale.  Will continue to monitor blood sugar levels and make further adjustments as needed.  "

## 2024-11-19 NOTE — H&P
"H&P - Hospitalist   Name: Naresh Carpio 64 y.o. male I MRN: [id number]  Unit/Bed#: [address] [place] [address] I Date of Admission: 11/19/2024   Date of Service: 11/19/2024 I Hospital Day: 0     Assessment & Plan  Acute hypoxic respiratory failure (HCC)  Patient was seen and examined in the emergency department and will be admitted to the hospital for further evaluation and management.  Patient presenting with shortness of breath found to have evidence of acute hypoxic respiratory failure. Chest x-ray showing evidence of pulmonary edema with bilateral pleural effusion secondary to volume overload from acute on chronic diastolic congestive heart failure brought on by the missed dialysis session this weekend.  Fluid management with hemodialysis per nephrology.  Continue supplemental oxygen therapy as needed and wean as tolerated.  Anemia due to chronic kidney disease, on chronic dialysis ([facility])  Lab Results   Component Value Date    EGFR 5 11/19/2024    EGFR 7 11/09/2024    EGFR 10 11/08/2024    CREATININE 9.08 (H) 11/19/2024    CREATININE 6.95 (H) 11/09/2024    CREATININE 5.48 (H) 11/08/2024     Type II diabetes mellitus ([facility])  Lab Results   Component Value Date    HGBA1C 5.5 10/08/2024       No results for input(s): \"POCGLU\" in the last 72 hours.    Blood Sugar Average: Last 72 hrs:    Patient will be placed on an insulin sliding scale.  Will continue to monitor blood sugar levels and make further adjustments as needed.  Hypertension  Continue home medications.  Will continue to monitor and make further adjustments as needed.  Chronic kidney disease-mineral and bone disorder  Lab Results   Component Value Date    EGFR 5 11/19/2024    EGFR 7 11/09/2024    EGFR 10 11/08/2024    CREATININE 9.08 (H) 11/19/2024    CREATININE 6.95 (H) 11/09/2024    CREATININE 5.48 (H) 11/08/2024     ESRD (end stage renal disease) ([facility])  Lab Results   Component Value Date    EGFR 5 11/19/2024    EGFR 7 11/09/2024    EGFR 10 11/08/2024    CREATININE " 9.08 (H) 11/19/2024    CREATININE 6.95 (H) 11/09/2024    CREATININE 5.48 (H) 11/08/2024   As noted above, patient with a missed dialysis session this weekend.  Continue hemodialysis support per recommendations from nephrology.  Paroxysmal atrial fibrillation (HCC)  Continue home medications.  Will continue to monitor.  Acute on chronic diastolic (congestive) heart failure (HCC)  Wt Readings from Last 3 Encounters:   11/19/24 106 kg (234 lb)   11/07/24 106 kg (233 lb 14.5 oz)   10/22/24 97.7 kg (215 lb 6.2 oz)     Chest x-ray showing evidence of pulmonary edema with bilateral pleural effusions secondary to volume overload from acute on chronic diastolic congestive heart failure brought on by his missed dialysis session this weekend.  Fluid management with hemodialysis per nephrology.  Will continue to monitor.  Pulmonary edema  Chest x-ray showing evidence of pulmonary edema with bilateral pleural effusion secondary to volume overload from acute on chronic diastolic congestive heart failure brought on by the missed dialysis session this weekend.  Fluid management with hemodialysis per nephrology.  Will continue to closely monitor.  Pleural effusion  Chest x-ray showing evidence of pulmonary edema with bilateral pleural effusion secondary to volume overload from acute on chronic diastolic congestive heart failure brought on by the missed dialysis session this weekend.  Fluid management with hemodialysis per nephrology.  Will continue to closely monitor.  Elevated troponin  Patient with elevated troponin levels likely secondary to demand ischemia from acute on chronic diastolic congestive heart failure from end-stage renal disease after a missed dialysis session.  Fluid management with hemodialysis per nephrology.  Will continue to monitor on telemetry.    VTE Pharmacologic Prophylaxis:   Heparin prophylaxis.  Code Status: Level 1 - Full Code     Anticipated Length of Stay: Patient will be admitted on an inpatient  basis with an anticipated length of stay of greater than 2 midnights secondary to above.    History of Present Illness   Chief Complaint: Shortness of breath.    Naresh Carpio is a 64 y.o. male presenting to the emergency department with complaints of shortness of breath.  Patient with a history of end-stage renal disease on hemodialysis.  Patient reports that he missed his scheduled dialysis session on Saturday secondary to back pain.  Chest x-ray performed in the emergency room shows evidence of pulmonary edema with bilateral pleural effusions.  Patient currently denies any chest pain, abdominal pain, lightheadedness, dizziness, palpitations, fevers, chills, change in urinary habits, change in bowel habits, recent travel or any known sick contacts.    14 point of review of systems obtained and reviewed and is negative except as outlined above in the HPI.     Historical Information   Past Medical History:   Diagnosis Date    Acute cystitis 10/18/2024    Acute on chronic anemia 01/23/2022    Atrial fibrillation (HCC)     Diabetes mellitus (HCC)     ESRD (end stage renal disease) on dialysis (HCC)     Hematuria 10/10/2024    Hematuria 10/10/2024    Hyperkalemia 04/06/2024    Hyperlipidemia     Hypertension     Left toe amputee (HCC)     TIA (transient ischemic attack)     Toxic metabolic encephalopathy 10/08/2024     Past Surgical History:   Procedure Laterality Date    AMPUTATION Left     left foot resection 10 years ago dr tran    IR LOWER EXTREMITY ANGIOGRAM  08/29/2023    IR TEMPORARY DIALYSIS CATHETER PLACEMENT  08/25/2023    IR TUNNELED CENTRAL LINE REMOVAL  08/23/2023    IR TUNNELED DIALYSIS CATHETER PLACEMENT  01/27/2022    IR TUNNELED DIALYSIS CATHETER PLACEMENT  08/30/2023    CA AMPUTATION METATARSAL W/TOE SINGLE Right 8/31/2023    Procedure: RIGHT PARTIAL 1ST RAY RESECTION WITH  WOUND VAC APPLICATION;  Surgeon: Won Parmar DPM;  Location: [facility];  Service: Podiatry     Social History     Tobacco  Use    Smoking status: Never     Passive exposure: Never    Smokeless tobacco: Never   Vaping Use    Vaping status: Never Used   Substance and Sexual Activity    Alcohol use: Not Currently     Alcohol/week: 0.0 standard drinks of alcohol     Comment: 0    Drug use: Never    Sexual activity: Not on file     E-Cigarette/Vaping    E-Cigarette Use Never User      E-Cigarette/Vaping Substances    Nicotine No     THC No     CBD No     Flavoring No     Other No     Unknown No      History reviewed. No pertinent family history.  Social History:  Marital Status:      Meds/Allergies   Prior to Admission medications    Medication Sig Start Date End Date Taking? Authorizing Provider   allopurinol (ZYLOPRIM) 100 mg tablet Take 1 tablet (100 mg total) by mouth daily 3/22/24 11/23/24  Lore Silver DO   aspirin 81 mg chewable tablet Chew 1 tablet (81 mg total) daily 10/23/24 11/22/24 Yes Mir Salinas MD   atorvastatin (LIPITOR) 80 mg tablet Take 80 mg by mouth daily 11/2/21  Yes Historical Provider, MD   gabapentin (NEURONTIN) 100 mg capsule Take 2 capsules (200 mg total) by mouth 2 (two) times a day  Patient taking differently: Take 200 mg by mouth 2 (two) times a day 3/22/24 11/19/24 Yes Lore Silver DO   naloxone (NARCAN) 4 mg/0.1 mL nasal spray Administer 1 spray into a nostril. If no response after 2-3 minutes, give another dose in the other nostril using a new spray. 7/16/22   Lroe Silver DO   NIFEdipine (PROCARDIA XL) 30 mg 24 hr tablet Take 2 tablets (60 mg total) by mouth daily after dinner 10/22/24 11/21/24 Yes Mir Salinas MD   pantoprazole (PROTONIX) 40 mg tablet Take 1 tablet (40 mg total) by mouth daily in the early morning  Patient not taking: Reported on 11/19/2024 10/23/24   Mir Salinas MD   polyethylene glycol (MIRALAX) 17 g packet Take 17 g by mouth daily Do not start before September 7, 2023.  Patient not taking: Reported on 11/19/2024 9/7/23   DENISSE Viveros  (RENAGEL) 800 mg tablet Take 1 tablet (800 mg total) by mouth 3 (three) times a day with meals  Patient not taking: Reported on 11/19/2024 2/2/22   Lore Silver, DO     No Known Allergies    Objective :  Temp:  [98.4 °F (36.9 °C)-98.5 °F (36.9 °C)] 98.5 °F (36.9 °C)  HR:  [74-82] 79  BP: (130-143)/(58-98) 143/98  Resp:  [22-24] 22  SpO2:  [77 %-94 %] 92 %  O2 Device: Mid flow nasal cannula  Nasal Cannula O2 Flow Rate (L/min):  [6 L/min] 6 L/min  FiO2 (%):  [15] 15    Physical Exam  HENT:      Head: Normocephalic.      Mouth/Throat:      Mouth: Mucous membranes are moist.   Eyes:      Extraocular Movements: Extraocular movements intact.   Cardiovascular:      Rate and Rhythm: Normal rate.   Pulmonary:      Effort: Pulmonary effort is normal.      Comments: Decreased in the bases  Abdominal:      Palpations: Abdomen is soft.   Skin:     General: Skin is warm.   Neurological:      Mental Status: He is alert and oriented to person, place, and time.   Psychiatric:         Mood and Affect: Mood normal.       Lines/Drains:  Lines/Drains/Airways       Active Status       Name Placement date Placement time Site Days    HD Permanent Double Catheter --  --  Internal jugular  --                      Lab Results: I have reviewed the following results:  Results from last 7 days   Lab Units 11/19/24  1042   WBC Thousand/uL 9.52   HEMOGLOBIN g/dL 8.2*   HEMATOCRIT % 26.5*   PLATELETS Thousands/uL 270   SEGS PCT % 78*   LYMPHO PCT % 9*   MONO PCT % 8   EOS PCT % 4     Results from last 7 days   Lab Units 11/19/24  1042   SODIUM mmol/L 136   POTASSIUM mmol/L 4.9   CHLORIDE mmol/L 100   CO2 mmol/L 23   BUN mg/dL 74*   CREATININE mg/dL 9.08*   ANION GAP mmol/L 13   CALCIUM mg/dL 9.1   ALBUMIN g/dL 3.9   TOTAL BILIRUBIN mg/dL 0.38   ALK PHOS U/L 111*   ALT U/L 24   AST U/L 16   GLUCOSE RANDOM mg/dL 155*     Lab Results   Component Value Date    HGBA1C 5.5 10/08/2024    HGBA1C 6.2 (H) 07/02/2024    HGBA1C 5.4 03/12/2024      Administrative Statements   I have spent a total time of 75 minutes in caring for this patient on the day of the visit/encounter including Diagnostic results, Prognosis, Risks and benefits of tx options, Instructions for management, Patient and family education, Importance of tx compliance, Risk factor reductions, Impressions, Counseling / Coordination of care, Documenting in the medical record, Reviewing / ordering tests, medicine, procedures  , Obtaining or reviewing history  , and Communicating with other healthcare professionals     ** Please Note: This note has been constructed using a voice recognition system. **

## 2024-11-19 NOTE — ED PROVIDER NOTES
Time reflects when diagnosis was documented in both MDM as applicable and the Disposition within this note       Time User Action Codes Description Comment    11/19/2024 11:14 AM Solomon Davis [J96.01] Acute hypoxic respiratory failure (HCC)     11/19/2024 11:14 AM Solomon Davis [J81.1] Pulmonary edema     11/19/2024 11:14 AM Solomon Davis [Z91.158] Non-compliance with renal dialysis (HCC)           ED Disposition       ED Disposition   Admit    Condition   Stable    Date/Time   Tue Nov 19, 2024 11:14 AM    Comment   Case was discussed with JANIA and the patient's admission status was agreed to be Admission Status: inpatient status to the service of Dr. Ruiz .               Assessment & Plan       Medical Decision Making  Patient with chest tightness, shortness of breath, appears to be secondary to fluid overload in the setting of missed dialysis.  Differential diagnosis additionally includes but is not limited to atypical presentation of ACS, pneumonia, pneumothorax.  I ordered and reviewed lab work including CBC, CMP, troponin to evaluate for electrolyte or renal derangement, leukocytosis, anemia, elevated troponin to suggest cardiac strain.  I ordered and independently interpreted an EKG.  I ordered and independently interpreted chest x-ray which demonstrates pulmonary edema.  I discussed patient with nephrology who recommends admission for dialysis. I discussed patient with the hospitalist for further evaluation and management.     Amount and/or Complexity of Data Reviewed  Labs: ordered.  Radiology: ordered and independent interpretation performed.    Risk  Decision regarding hospitalization.        ED Course as of 11/19/24 1120   Tue Nov 19, 2024   1016 Patient discussed with Dr. Linton with nephrology who added patient to dialysis schedule       Medications - No data to display    ED Risk Strat Scores                                               History of Present Illness        Chief Complaint   Patient presents with    Shortness of Breath     Patient arrives satting at 77% on RA after missing dialysis Saturday because he threw out his back. Patient was on his way to dialysis this am and could not make it to the car because he was so SOB       Past Medical History:   Diagnosis Date    Acute cystitis 10/18/2024    Acute on chronic anemia 01/23/2022    Atrial fibrillation (HCC)     Diabetes mellitus (HCC)     ESRD (end stage renal disease) on dialysis (HCC)     Hematuria 10/10/2024    Hematuria 10/10/2024    Hyperkalemia 04/06/2024    Hyperlipidemia     Hypertension     Left toe amputee (HCC)     TIA (transient ischemic attack)     Toxic metabolic encephalopathy 10/08/2024      Past Surgical History:   Procedure Laterality Date    AMPUTATION Left     left foot resection 10 years ago dr tran    IR LOWER EXTREMITY ANGIOGRAM  08/29/2023    IR TEMPORARY DIALYSIS CATHETER PLACEMENT  08/25/2023    IR TUNNELED CENTRAL LINE REMOVAL  08/23/2023    IR TUNNELED DIALYSIS CATHETER PLACEMENT  01/27/2022    IR TUNNELED DIALYSIS CATHETER PLACEMENT  08/30/2023    [place] AMPUTATION METATARSAL W/TOE SINGLE Right 8/31/2023    Procedure: RIGHT PARTIAL 1ST RAY RESECTION WITH  WOUND VAC APPLICATION;  Surgeon: Won Parmar DPM;  Location: [facility];  Service: Podiatry      History reviewed. No pertinent family history.   Social History     Tobacco Use    Smoking status: Never     Passive exposure: Never    Smokeless tobacco: Never   Vaping Use    Vaping status: Never Used   Substance Use Topics    Alcohol use: Not Currently     Alcohol/week: 0.0 standard drinks of alcohol     Comment: 0    Drug use: Never      E-Cigarette/Vaping    E-Cigarette Use Never User       E-Cigarette/Vaping Substances    Nicotine No     THC No     CBD No     Flavoring No     Other No     Unknown No       I have reviewed and agree with the history as documented.     Patient is a 64-year-old male history of end-stage renal disease on  dialysis presenting for evaluation of shortness of breath, chest tightness, hypoxia.  Patient states that he was twisting when putting his shoes on a few days ago, hurt his back, and states that secondary to the pain he was unable to go to dialysis on Saturday.  Patient on a Tuesday Thursday Saturday dialysis schedule.  Patient denies any additional missed sessions.  Patient states that he woke up this morning with significant chest tightness and shortness of breath which has persisted since that time.  Patient satting 78% for EMS on room air.  Patient denies any sharp chest pain, nausea, vomiting, diaphoresis, syncope or near syncope.  Patient notes some abdominal swelling and leg swelling, states that he has not taken his weight at home.  Patient denies cough, fevers, chills, recent travel or sick contacts.        Review of Systems   Constitutional:  Negative for chills, fatigue and fever.   Respiratory:  Positive for chest tightness and shortness of breath. Negative for cough and wheezing.    Cardiovascular:  Negative for chest pain.   Gastrointestinal:  Negative for diarrhea, nausea and vomiting.   Musculoskeletal:  Negative for arthralgias and myalgias.   Neurological:  Negative for light-headedness.   Psychiatric/Behavioral:  Negative for confusion.    All other systems reviewed and are negative.          Objective       ED Triage Vitals   Temperature Pulse Blood Pressure Respirations SpO2 Patient Position - Orthostatic VS   11/19/24 1000 11/19/24 1000 11/19/24 1000 11/19/24 1000 11/19/24 1000 11/19/24 1000   98.4 °F (36.9 °C) 82 130/58 (!) 24 (!) 77 % Sitting      Temp Source Heart Rate Source BP Location FiO2 (%) Pain Score    11/19/24 1000 11/19/24 1000 11/19/24 1000 11/19/24 1005 --    Oral Monitor Right arm 15       Vitals      Date and Time Temp Pulse SpO2 Resp BP Pain Score FACES Pain Rating User   11/19/24 1115 -- 74 92 % 22 139/60 -- -- AG   11/19/24 1058 -- -- 90 % -- -- -- -- AG   11/19/24 1041 --  78 91 % 24 138/62 -- -- AG   11/19/24 1005 -- -- 94 % -- -- -- -- CHRISTIANO   11/19/24 1001 -- -- 84 % -- -- -- -- CHRISTIANO   11/19/24 1000 98.4 °F (36.9 °C) 82 77 % 24 130/58 -- -- CHRISTIANO            Physical Exam  Vitals and nursing note reviewed.   Constitutional:       General: He is not in acute distress.     Appearance: Normal appearance. He is ill-appearing. He is not toxic-appearing or diaphoretic.      Comments: Ill-appearing, moderately distressed   HENT:      Head: Normocephalic and atraumatic.      Comments: Moist mucous membranes     Right Ear: External ear normal.      Left Ear: External ear normal.   Eyes:      General:         Right eye: No discharge.         Left eye: No discharge.   Cardiovascular:      Comments: Regular rate and rhythm, no murmurs rubs or gallops.  Extremities warm and well-perfused without mottling  Pulmonary:      Effort: Respiratory distress present.      Comments: Increased work of breathing but speaking in complete sentences on supplemental oxygen.  Initially satting in the mid 80s on 6 L nasal cannula.  Transitioned to 15 L NRBM.  Rales bilaterally in the lung bases  Abdominal:      General: There is no distension.   Musculoskeletal:         General: No deformity.      Cervical back: Normal range of motion.      Comments: Profound bilateral lower extremity edema bilaterally   Skin:     Findings: No lesion or rash.   Neurological:      Mental Status: He is alert and oriented to person, place, and time. Mental status is at baseline.      Comments: Awake, alert, pleasant, interactive   Psychiatric:         Mood and Affect: Mood and affect normal.         Results Reviewed       Procedure Component Value Units Date/Time    HS Troponin I 2hr [[id number]]     Lab Status: No result Specimen: Blood     HS Troponin 0hr (reflex protocol) [[id number]]  (Abnormal) Collected: 11/19/24 1042    Lab Status: Final result Specimen: Blood from Arm, Right Updated: 11/19/24 1117     hs TnI 0hr 311 ng/L      Comprehensive metabolic panel [[id number]]  (Abnormal) Collected: 11/19/24 1042    Lab Status: Final result Specimen: Blood from Arm, Right Updated: 11/19/24 1106     Sodium 136 mmol/L      Potassium 4.9 mmol/L      Chloride 100 mmol/L      CO2 23 mmol/L      ANION GAP 13 mmol/L      BUN 74 mg/dL      Creatinine 9.08 mg/dL      Glucose 155 mg/dL      Calcium 9.1 mg/dL      AST 16 U/L      ALT 24 U/L      Alkaline Phosphatase 111 U/L      Total Protein 7.1 g/dL      Albumin 3.9 g/dL      Total Bilirubin 0.38 mg/dL      eGFR 5 ml/min/1.73sq m     Narrative:      National Kidney Disease Foundation guidelines for Chronic Kidney Disease (CKD):     Stage 1 with normal or high GFR (GFR > 90 mL/min/1.73 square meters)    Stage 2 Mild CKD (GFR = 60-89 mL/min/1.73 square meters)    Stage 3A Moderate CKD (GFR = 45-59 mL/min/1.73 square meters)    Stage 3B Moderate CKD (GFR = 30-44 mL/min/1.73 square meters)    Stage 4 Severe CKD (GFR = 15-29 mL/min/1.73 square meters)    Stage 5 End Stage CKD (GFR <15 mL/min/1.73 square meters)  Note: GFR calculation is accurate only with a steady state creatinine    CBC and differential [[id number]]  (Abnormal) Collected: 11/19/24 1042    Lab Status: Final result Specimen: Blood from Arm, Right Updated: 11/19/24 1049     WBC 9.52 Thousand/uL      RBC 2.71 Million/uL      Hemoglobin 8.2 g/dL      Hematocrit 26.5 %      MCV 98 fL      MCH 30.3 pg      MCHC 30.9 g/dL      RDW 15.5 %      MPV 9.8 fL      Platelets 270 Thousands/uL      nRBC 0 /100 WBCs      Segmented % 78 %      Immature Grans % 1 %      Lymphocytes % 9 %      Monocytes % 8 %      Eosinophils Relative 4 %      Basophils Relative 0 %      Absolute Neutrophils 7.47 Thousands/µL      Absolute Immature Grans 0.06 Thousand/uL      Absolute Lymphocytes 0.81 Thousands/µL      Absolute Monocytes 0.76 Thousand/µL      Eosinophils Absolute 0.38 Thousand/µL      Basophils Absolute 0.04 Thousands/µL             XR chest 1 view portable   ED  Interpretation by Solomon Davis MD (11/19 1108)   Cardiomegaly and pulmonary edema right worse than the left          ECG 12 Lead Documentation Only    Date/Time: 11/19/2024 11:17 AM    Performed by: Solomon Davis MD  Authorized by: Solomon Davis MD    Indications / Diagnosis:  Shortness of breath  ECG reviewed by me, the ED Provider: yes    Patient location:  ED  Previous ECG:     Previous ECG:  Compared to current    Similarity:  Changes noted    Comparison to cardiac monitor: No    Interpretation:     Interpretation: abnormal    Rate:     ECG rate:  80    ECG rate assessment: normal    Ectopy:     Ectopy: none    QRS:     QRS axis:  Normal    QRS intervals:  Normal  Conduction:     Conduction: normal    ST segments:     ST segments:  Abnormal    Depression:  V5 and V6  CriticalCare Time    Date/Time: 11/19/2024 11:19 AM    Performed by: Solomon Davis MD  Authorized by: Solomon Davis MD    Critical care provider statement:     Critical care time (minutes):  32    Critical care time was exclusive of:  Separately billable procedures and treating other patients and teaching time    Critical care was necessary to treat or prevent imminent or life-threatening deterioration of the following conditions:  Respiratory failure and renal failure    Critical care was time spent personally by me on the following activities:  Blood draw for specimens, ordering and performing treatments and interventions, obtaining history from patient or surrogate, development of treatment plan with patient or surrogate, ordering and review of laboratory studies, ordering and review of radiographic studies, discussions with consultants, evaluation of patient's response to treatment, examination of patient and review of old charts  Comments:      Placed on mid flow for acute hypoxic respiratory failure.  Discussed with nephrology for emergent hemodialysis given his respiratory symptoms.      ED Medication and  Procedure Management   Prior to Admission Medications   Prescriptions Last Dose Informant Patient Reported? Taking?   NIFEdipine (PROCARDIA XL) 30 mg 24 hr tablet   No No   Sig: Take 2 tablets (60 mg total) by mouth daily after dinner   allopurinol (ZYLOPRIM) 100 mg tablet  Self No No   Sig: Take 1 tablet (100 mg total) by mouth daily   aspirin 81 mg chewable tablet   No No   Sig: Chew 1 tablet (81 mg total) daily   atorvastatin (LIPITOR) 80 mg tablet   Yes No   Sig: Take 80 mg by mouth daily   gabapentin (NEURONTIN) 100 mg capsule   No No   Sig: Take 2 capsules (200 mg total) by mouth 2 (two) times a day   naloxone (NARCAN) 4 mg/0.1 mL nasal spray  Self No No   Sig: Administer 1 spray into a nostril. If no response after 2-3 minutes, give another dose in the other nostril using a new spray.   pantoprazole (PROTONIX) 40 mg tablet   No No   Sig: Take 1 tablet (40 mg total) by mouth daily in the early morning   polyethylene glycol (MIRALAX) 17 g packet  Self No No   Sig: Take 17 g by mouth daily Do not start before September 7, 2023.   sevelamer (RENAGEL) 800 mg tablet  Self No No   Sig: Take 1 tablet (800 mg total) by mouth 3 (three) times a day with meals      Facility-Administered Medications: None     Patient's Medications   Discharge Prescriptions    No medications on file     No discharge procedures on file.  ED SEPSIS DOCUMENTATION   Time reflects when diagnosis was documented in both MDM as applicable and the Disposition within this note       Time User Action Codes Description Comment    11/19/2024 11:14 AM Solomon Davis [J96.01] Acute hypoxic respiratory failure (HCC)     11/19/2024 11:14 AM Solomon Davis [J81.1] Pulmonary edema     11/19/2024 11:14 AM Solomon Davis [Z91.158] Non-compliance with renal dialysis (HCC)                  Solomon Davis MD  11/19/24 1120

## 2024-11-19 NOTE — ASSESSMENT & PLAN NOTE
Patient with elevated troponin levels likely secondary to demand ischemia from acute on chronic diastolic congestive heart failure from end-stage renal disease after a missed dialysis session.  Fluid management with hemodialysis per nephrology.  Will continue to monitor on telemetry.

## 2024-11-19 NOTE — ASSESSMENT & PLAN NOTE
This is most likely due to volume overload in setting of missing hemodialysis  We will perform hemodialysis today with aim for 3 to 4 L ultrafiltration

## 2024-11-19 NOTE — ASSESSMENT & PLAN NOTE
Chest x-ray showing evidence of pulmonary edema with bilateral pleural effusion secondary to volume overload from acute on chronic diastolic congestive heart failure brought on by the missed dialysis session this weekend.  Fluid management with hemodialysis per nephrology.  Will continue to closely monitor.

## 2024-11-19 NOTE — CONSULTS
NEPHROLOGY HOSPITAL CONSULTATION   Naresh Carpio 64 y.o. male MRN: [id number]  Unit/Bed#: ED 07 Encounter: [id number]    Brief History of Admission - Naresh Carpio is a 64 y.o. male who was admitted to [facility] after presenting with shortness of breath. A renal consultation is requested today for assistance in the management of ESRD.    Assessment & Plan  Acute hypoxic respiratory failure (HCC)  This is most likely due to volume overload in setting of missing hemodialysis  We will perform hemodialysis today with aim for 3 to 4 L ultrafiltration  ESRD (end stage renal disease) (HCC)  TTS at [facility]  Missed HD on Saturday   kg  HD today  Access: Right IJ permacath  Anemia due to chronic kidney disease, on chronic dialysis (HCC)  Hemoglobin today 8.2  Monitor off SURINDER  Hypertension  Home Rx: Nifedipine 60 mg daily  Continue home Rx  Chronic kidney disease-mineral and bone disorder  Continue sevelamer with meals    Discussed with ED team.  After discussion, we agreed that patient is currently in need for urgent hemodialysis due to hypoxic respiratory failure from volume overload and this will be performed today.    HISTORY OF PRESENT ILLNESS:  Requesting Physician: Milan Ruiz MD  Reason for Consult: ESRD on HD    Naresh Carpio is a 64 y.o. male who was admitted to [facility] after presenting with shortness of breath. A renal consultation is requested today for assistance in the management of ESRD.  Patient missed his dialysis session on Saturday due to back pain.  Today he presented with worsening shortness of breath.  Also has leg swelling.  He goes to [facility] on TTS schedule.    PAST MEDICAL HISTORY:  Past Medical History:   Diagnosis Date    Acute cystitis 10/18/2024    Acute on chronic anemia 01/23/2022    Atrial fibrillation (HCC)     Diabetes mellitus (HCC)     ESRD (end stage renal disease) on dialysis (HCC)     Hematuria 10/10/2024     Hematuria 10/10/2024    Hyperkalemia 04/06/2024    Hyperlipidemia     Hypertension     Left toe amputee (HCC)     TIA (transient ischemic attack)     Toxic metabolic encephalopathy 10/08/2024       PAST SURGICAL HISTORY:  Past Surgical History:   Procedure Laterality Date    AMPUTATION Left     left foot resection 10 years ago dr tran    IR LOWER EXTREMITY ANGIOGRAM  08/29/2023    IR TEMPORARY DIALYSIS CATHETER PLACEMENT  08/25/2023    IR TUNNELED CENTRAL LINE REMOVAL  08/23/2023    IR TUNNELED DIALYSIS CATHETER PLACEMENT  01/27/2022    IR TUNNELED DIALYSIS CATHETER PLACEMENT  08/30/2023    [place] AMPUTATION METATARSAL W/TOE SINGLE Right 8/31/2023    Procedure: RIGHT PARTIAL 1ST RAY RESECTION WITH  WOUND VAC APPLICATION;  Surgeon: Won Parmar DPM;  Location: [facility];  Service: Podiatry       ALLERGIES:  No Known Allergies    SOCIAL HISTORY:  Social History     Substance and Sexual Activity   Alcohol Use Not Currently    Alcohol/week: 0.0 standard drinks of alcohol    Comment: 0     Social History     Substance and Sexual Activity   Drug Use Never     Social History     Tobacco Use   Smoking Status Never    Passive exposure: Never   Smokeless Tobacco Never       FAMILY HISTORY:  History reviewed. No pertinent family history.    MEDICATIONS:  No current facility-administered medications for this encounter.    Current Outpatient Medications:     allopurinol (ZYLOPRIM) 100 mg tablet, Take 1 tablet (100 mg total) by mouth daily, Disp: 30 tablet, Rfl: 0    aspirin 81 mg chewable tablet, Chew 1 tablet (81 mg total) daily, Disp: 30 tablet, Rfl: 0    atorvastatin (LIPITOR) 80 mg tablet, Take 80 mg by mouth daily, Disp: , Rfl:     gabapentin (NEURONTIN) 100 mg capsule, Take 2 capsules (200 mg total) by mouth 2 (two) times a day, Disp: 120 capsule, Rfl: 0    naloxone (NARCAN) 4 mg/0.1 mL nasal spray, Administer 1 spray into a nostril. If no response after 2-3 minutes, give another dose in the other nostril using a new  spray., Disp: 1 each, Rfl: 0    NIFEdipine (PROCARDIA XL) 30 mg 24 hr tablet, Take 2 tablets (60 mg total) by mouth daily after dinner, Disp: 60 tablet, Rfl: 0    pantoprazole (PROTONIX) 40 mg tablet, Take 1 tablet (40 mg total) by mouth daily in the early morning, Disp: 30 tablet, Rfl: 0    polyethylene glycol (MIRALAX) 17 g packet, Take 17 g by mouth daily Do not start before September 7, 2023., Disp: , Rfl: 0    sevelamer (RENAGEL) 800 mg tablet, Take 1 tablet (800 mg total) by mouth 3 (three) times a day with meals, Disp: , Rfl: 0    Review of Systems   Constitutional:  Negative for chills and fever.   HENT:  Negative for ear pain and sore throat.    Eyes:  Negative for pain and visual disturbance.   Respiratory:  Positive for shortness of breath. Negative for cough.    Cardiovascular:  Positive for leg swelling. Negative for chest pain and palpitations.   Gastrointestinal:  Negative for abdominal pain and vomiting.   Genitourinary:  Negative for dysuria and hematuria.   Musculoskeletal:  Negative for arthralgias and back pain.   Skin:  Negative for color change and rash.   Neurological:  Negative for seizures and syncope.   All other systems reviewed and are negative.    PHYSICAL EXAM:  Current Weight:    First Weight:    Vitals:    11/19/24 1005 11/19/24 1041 11/19/24 1058 11/19/24 1115   BP:  138/62  139/60   BP Location:  Right arm  Right arm   Pulse:  78  74   Resp:  (!) 24  22   Temp:       TempSrc:       SpO2: 94% 91% 90% 92%     No intake or output data in the 24 hours ending 11/19/24 1143  Physical Exam  Constitutional:       Appearance: Normal appearance.   HENT:      Head: Normocephalic and atraumatic.   Cardiovascular:      Rate and Rhythm: Normal rate and regular rhythm.      Pulses: Normal pulses.      Heart sounds: Normal heart sounds.   Pulmonary:      Effort: Pulmonary effort is normal.      Comments: Diminished breath sounds bilaterally at bases  Musculoskeletal:         General: Normal range  "of motion.      Right lower leg: Edema present.      Left lower leg: Edema present.   Skin:     General: Skin is warm.   Neurological:      Mental Status: He is alert and oriented to person, place, and time. Mental status is at baseline.   Psychiatric:         Mood and Affect: Mood normal.       Lab Results:   Results from last 7 days   Lab Units 11/19/24  1042   WBC Thousand/uL 9.52   HEMOGLOBIN g/dL 8.2*   HEMATOCRIT % 26.5*   PLATELETS Thousands/uL 270   POTASSIUM mmol/L 4.9   CHLORIDE mmol/L 100   CO2 mmol/L 23   BUN mg/dL 74*   CREATININE mg/dL 9.08*   CALCIUM mg/dL 9.1   ALK PHOS U/L 111*   ALT U/L 24   AST U/L 16     Portions of the record may have been created with voice recognition software. Occasional wrong word or \"sound a like\" substitutions may have occurred due to the inherent limitations of voice recognition software. Read the chart carefully and recognize, using context, where substitutions have occurred.If you have any questions, please contact the dictating provider.    "

## 2024-11-19 NOTE — ASSESSMENT & PLAN NOTE
TTS at [facility]  Missed HD on Saturday   kg  HD today  Access: Right IJ permacath

## 2024-11-19 NOTE — ASSESSMENT & PLAN NOTE
Wt Readings from Last 3 Encounters:   11/19/24 106 kg (234 lb)   11/07/24 106 kg (233 lb 14.5 oz)   10/22/24 97.7 kg (215 lb 6.2 oz)     Chest x-ray showing evidence of pulmonary edema with bilateral pleural effusions secondary to volume overload from acute on chronic diastolic congestive heart failure brought on by his missed dialysis session this weekend.  Fluid management with hemodialysis per nephrology.  Will continue to monitor.

## 2024-11-19 NOTE — PLAN OF CARE
Target UF Goal  3-4  L as tolerated. Patient dialyzing for 4 hours on 2 K bath for serum K of  4.9  per protocol. Treatment plan reviewed with Nephrology.   Problem: METABOLIC, FLUID AND ELECTROLYTES - ADULT  Goal: Electrolytes maintained within normal limits  Description: INTERVENTIONS:  - Monitor labs and assess patient for signs and symptoms of electrolyte imbalances  - Administer electrolyte replacement as ordered  - Monitor response to electrolyte replacements, including repeat lab results as appropriate  - Instruct patient on fluid and nutrition as appropriate  Outcome: Progressing  Goal: Fluid balance maintained  Description: INTERVENTIONS:  - Monitor labs   - Monitor I/O and WT  - Instruct patient on fluid and nutrition as appropriate  - Assess for signs & symptoms of volume excess or deficit  Outcome: Progressing   Post-Dialysis RN Treatment Note    Blood Pressure:  Pre 121/80 mm/Hg  Post 134/94 mmHg   EDW:  tbd kg    Weight:  Pre 113.7 kg   Post 110.2 kg   Mode of weight measurement: Bed Scale   Volume Removed:  3447 ml    Treatment duration: 210 minutes    NS given:  No    Treatment shortened Yes, describe: due the need to accommodate an urgent treatment for a patient on another campus. Dr. Linton was aware.   Medications given during Rx: None Reported   Estimated Kt/V:  None Reported   Access type: Permacath/TDC   Needle Gauge:  n/a   Access Issues: No    Report called to primary nurse:   Yes Janelle Steel RN

## 2024-11-19 NOTE — ASSESSMENT & PLAN NOTE
Lab Results   Component Value Date    EGFR 5 11/19/2024    EGFR 7 11/09/2024    EGFR 10 11/08/2024    CREATININE 9.08 (H) 11/19/2024    CREATININE 6.95 (H) 11/09/2024    CREATININE 5.48 (H) 11/08/2024   As noted above, patient with a missed dialysis session this weekend.  Continue hemodialysis support per recommendations from nephrology.

## 2024-11-20 ENCOUNTER — APPOINTMENT (INPATIENT)
Dept: DIALYSIS | Facility: HOSPITAL | Age: 64
DRG: 291 | End: 2024-11-20
Payer: MEDICARE

## 2024-11-20 ENCOUNTER — APPOINTMENT (INPATIENT)
Dept: RADIOLOGY | Facility: HOSPITAL | Age: 64
DRG: 291 | End: 2024-11-20
Payer: MEDICARE

## 2024-11-20 PROBLEM — T14.8XXA CHRONIC WOUND: Status: ACTIVE | Noted: 2024-11-20

## 2024-11-20 LAB
ANION GAP SERPL CALCULATED.3IONS-SCNC: 9 MMOL/L (ref 4–13)
BACTERIA UR QL AUTO: ABNORMAL /HPF
BASOPHILS # BLD AUTO: 0.03 THOUSANDS/ÂΜL (ref 0–0.1)
BASOPHILS NFR BLD AUTO: 0 % (ref 0–1)
BILIRUB UR QL STRIP: NEGATIVE
BUN SERPL-MCNC: 48 MG/DL (ref 5–25)
CALCIUM SERPL-MCNC: 8.3 MG/DL (ref 8.4–10.2)
CHLORIDE SERPL-SCNC: 94 MMOL/L (ref 96–108)
CLARITY UR: CLEAR
CO2 SERPL-SCNC: 29 MMOL/L (ref 21–32)
COLOR UR: YELLOW
CREAT SERPL-MCNC: 6.18 MG/DL (ref 0.6–1.3)
EOSINOPHIL # BLD AUTO: 0.43 THOUSAND/ÂΜL (ref 0–0.61)
EOSINOPHIL NFR BLD AUTO: 6 % (ref 0–6)
ERYTHROCYTE [DISTWIDTH] IN BLOOD BY AUTOMATED COUNT: 14.6 % (ref 11.6–15.1)
GFR SERPL CREATININE-BSD FRML MDRD: 8 ML/MIN/1.73SQ M
GLUCOSE SERPL-MCNC: 106 MG/DL (ref 65–140)
GLUCOSE SERPL-MCNC: 114 MG/DL (ref 65–140)
GLUCOSE SERPL-MCNC: 120 MG/DL (ref 65–140)
GLUCOSE SERPL-MCNC: 128 MG/DL (ref 65–140)
GLUCOSE SERPL-MCNC: 139 MG/DL (ref 65–140)
GLUCOSE UR STRIP-MCNC: ABNORMAL MG/DL
HCT VFR BLD AUTO: 23.8 % (ref 36.5–49.3)
HGB BLD-MCNC: 7.6 G/DL (ref 12–17)
HGB UR QL STRIP.AUTO: ABNORMAL
IMM GRANULOCYTES # BLD AUTO: 0.02 THOUSAND/UL (ref 0–0.2)
IMM GRANULOCYTES NFR BLD AUTO: 0 % (ref 0–2)
KETONES UR STRIP-MCNC: NEGATIVE MG/DL
LEUKOCYTE ESTERASE UR QL STRIP: ABNORMAL
LYMPHOCYTES # BLD AUTO: 1.06 THOUSANDS/ÂΜL (ref 0.6–4.47)
LYMPHOCYTES NFR BLD AUTO: 14 % (ref 14–44)
MAGNESIUM SERPL-MCNC: 2 MG/DL (ref 1.9–2.7)
MCH RBC QN AUTO: 30.9 PG (ref 26.8–34.3)
MCHC RBC AUTO-ENTMCNC: 31.9 G/DL (ref 31.4–37.4)
MCV RBC AUTO: 97 FL (ref 82–98)
MONOCYTES # BLD AUTO: 0.7 THOUSAND/ÂΜL (ref 0.17–1.22)
MONOCYTES NFR BLD AUTO: 9 % (ref 4–12)
NEUTROPHILS # BLD AUTO: 5.37 THOUSANDS/ÂΜL (ref 1.85–7.62)
NEUTS SEG NFR BLD AUTO: 71 % (ref 43–75)
NITRITE UR QL STRIP: NEGATIVE
NON-SQ EPI CELLS URNS QL MICRO: ABNORMAL /HPF
NRBC BLD AUTO-RTO: 0 /100 WBCS
PH UR STRIP.AUTO: 7.5 [PH]
PLATELET # BLD AUTO: 252 THOUSANDS/UL (ref 149–390)
PMV BLD AUTO: 9.9 FL (ref 8.9–12.7)
POTASSIUM SERPL-SCNC: 4.4 MMOL/L (ref 3.5–5.3)
PROT UR STRIP-MCNC: ABNORMAL MG/DL
RBC # BLD AUTO: 2.46 MILLION/UL (ref 3.88–5.62)
RBC #/AREA URNS AUTO: ABNORMAL /HPF
SODIUM SERPL-SCNC: 132 MMOL/L (ref 135–147)
SP GR UR STRIP.AUTO: 1.02 (ref 1–1.03)
UROBILINOGEN UR STRIP-ACNC: <2 MG/DL
WBC # BLD AUTO: 7.61 THOUSAND/UL (ref 4.31–10.16)
WBC #/AREA URNS AUTO: ABNORMAL /HPF

## 2024-11-20 PROCEDURE — 99232 SBSQ HOSP IP/OBS MODERATE 35: CPT | Performed by: INTERNAL MEDICINE

## 2024-11-20 PROCEDURE — 85025 COMPLETE CBC W/AUTO DIFF WBC: CPT | Performed by: FAMILY MEDICINE

## 2024-11-20 PROCEDURE — 94760 N-INVAS EAR/PLS OXIMETRY 1: CPT

## 2024-11-20 PROCEDURE — 80048 BASIC METABOLIC PNL TOTAL CA: CPT | Performed by: FAMILY MEDICINE

## 2024-11-20 PROCEDURE — 99232 SBSQ HOSP IP/OBS MODERATE 35: CPT | Performed by: FAMILY MEDICINE

## 2024-11-20 PROCEDURE — 5A1D70Z PERFORMANCE OF URINARY FILTRATION, INTERMITTENT, LESS THAN 6 HOURS PER DAY: ICD-10-PCS | Performed by: FAMILY MEDICINE

## 2024-11-20 PROCEDURE — 83735 ASSAY OF MAGNESIUM: CPT | Performed by: FAMILY MEDICINE

## 2024-11-20 PROCEDURE — 81001 URINALYSIS AUTO W/SCOPE: CPT | Performed by: FAMILY MEDICINE

## 2024-11-20 PROCEDURE — 99222 1ST HOSP IP/OBS MODERATE 55: CPT | Performed by: STUDENT IN AN ORGANIZED HEALTH CARE EDUCATION/TRAINING PROGRAM

## 2024-11-20 PROCEDURE — 82948 REAGENT STRIP/BLOOD GLUCOSE: CPT

## 2024-11-20 RX ORDER — ACETAMINOPHEN 325 MG/1
650 TABLET ORAL EVERY 6 HOURS PRN
Status: DISCONTINUED | OUTPATIENT
Start: 2024-11-20 | End: 2024-12-02 | Stop reason: HOSPADM

## 2024-11-20 RX ADMIN — ASPIRIN 81 MG CHEWABLE TABLET 81 MG: 81 TABLET CHEWABLE at 09:07

## 2024-11-20 RX ADMIN — GABAPENTIN 200 MG: 100 CAPSULE ORAL at 09:07

## 2024-11-20 RX ADMIN — NIFEDIPINE 60 MG: 30 TABLET, EXTENDED RELEASE ORAL at 17:33

## 2024-11-20 RX ADMIN — ATORVASTATIN CALCIUM 80 MG: 80 TABLET, FILM COATED ORAL at 09:07

## 2024-11-20 RX ADMIN — GABAPENTIN 200 MG: 100 CAPSULE ORAL at 17:33

## 2024-11-20 RX ADMIN — HEPARIN SODIUM 5000 UNITS: 5000 INJECTION, SOLUTION INTRAVENOUS; SUBCUTANEOUS at 09:07

## 2024-11-20 RX ADMIN — HEPARIN SODIUM 5000 UNITS: 5000 INJECTION, SOLUTION INTRAVENOUS; SUBCUTANEOUS at 21:42

## 2024-11-20 RX ADMIN — ALLOPURINOL 100 MG: 100 TABLET ORAL at 09:07

## 2024-11-20 RX ADMIN — Medication 2.5 MG: at 21:42

## 2024-11-20 NOTE — ASSESSMENT & PLAN NOTE
Wt Readings from Last 3 Encounters:   11/19/24 106 kg (234 lb)   11/07/24 106 kg (233 lb 14.5 oz)   10/22/24 97.7 kg (215 lb 6.2 oz)     Chest x-ray showing evidence of pulmonary edema with bilateral pleural effusions secondary to volume overload from acute on chronic diastolic congestive heart failure brought on by his missed dialysis session this weekend.  Fluid management with hemodialysis per nephrology.  Will continue to monitor.    11/20 - Patient reports feeling significantly improved today.  Extra session of ultrafiltration ordered for today.  Follow-up with further recommendations from nephrology.

## 2024-11-20 NOTE — ASSESSMENT & PLAN NOTE
Lab Results   Component Value Date    EGFR 8 11/20/2024    EGFR 5 11/19/2024    EGFR 7 11/09/2024    CREATININE 6.18 (H) 11/20/2024    CREATININE 9.08 (H) 11/19/2024    CREATININE 6.95 (H) 11/09/2024

## 2024-11-20 NOTE — ASSESSMENT & PLAN NOTE
TTS at [facility]  Missed HD on Saturday   kg  Last HD: 11/19  Next HD: 11/21  Extra session of ultrafiltration today  Access: Right chest wall permacath

## 2024-11-20 NOTE — PLAN OF CARE
Problem: RESPIRATORY - ADULT  Goal: Achieves optimal ventilation and oxygenation  Description: INTERVENTIONS:  - Assess for changes in respiratory status  - Assess for changes in mentation and behavior  - Position to facilitate oxygenation and minimize respiratory effort  - Oxygen administered by appropriate delivery if ordered  - Initiate smoking cessation education as indicated  - Encourage broncho-pulmonary hygiene including cough, deep breathe, Incentive Spirometry  - Assess the need for suctioning and aspirate as needed  - Assess and instruct to report SOB or any respiratory difficulty  - Respiratory Therapy support as indicated  Outcome: Progressing     Problem: GENITOURINARY - ADULT  Goal: Maintains or returns to baseline urinary function  Description: INTERVENTIONS:  - Assess urinary function  - Encourage oral fluids to ensure adequate hydration if ordered  - Administer IV fluids as ordered to ensure adequate hydration  - Administer ordered medications as needed  - Offer frequent toileting  - Follow urinary retention protocol if ordered  Outcome: Progressing     Problem: METABOLIC, FLUID AND ELECTROLYTES - ADULT  Goal: Fluid balance maintained  Description: INTERVENTIONS:  - Monitor labs   - Monitor I/O and WT  - Instruct patient on fluid and nutrition as appropriate  - Assess for signs & symptoms of volume excess or deficit  Outcome: Progressing

## 2024-11-20 NOTE — PLAN OF CARE
TX plan reviewed with Dr. Linton and he is agreeable to the following: Goal is to UF ONLY 2.5 L. Morning K+ 4.4. Pt aaron TX well.     Problem: METABOLIC, FLUID AND ELECTROLYTES - ADULT  Goal: Electrolytes maintained within normal limits  Description: INTERVENTIONS:  - Monitor labs and assess patient for signs and symptoms of electrolyte imbalances  - Administer electrolyte replacement as ordered  - Monitor response to electrolyte replacements, including repeat lab results as appropriate  - Instruct patient on fluid and nutrition as appropriate  Outcome: Progressing  Goal: Fluid balance maintained  Description: INTERVENTIONS:  - Monitor labs   - Monitor I/O and WT  - Instruct patient on fluid and nutrition as appropriate  - Assess for signs & symptoms of volume excess or deficit  Outcome: Progressing     Post-Dialysis RN Treatment Note    Blood Pressure:  Pre 118/59 mm/Hg  Post 123/103 mmHg   EDW:  YENNY    Weight:  Pre 112.9 kg   Post 110.5 kg   Mode of weight measurement: Standing Scale   Volume Removed:  2476 ml net   Treatment duration: 120 minutes    NS given:  No    Treatment shortened No   Medications given during Rx: Not Applicable   Estimated Kt/V:  Not Applicable   Access type: Permacath/TDC   Needle Gauge:  n/a   Access Issues: No    Report called to primary nurse:   Yes Janelle Steel, RN

## 2024-11-20 NOTE — ASSESSMENT & PLAN NOTE
Lab Results   Component Value Date    EGFR 8 11/20/2024    EGFR 5 11/19/2024    EGFR 7 11/09/2024    CREATININE 6.18 (H) 11/20/2024    CREATININE 9.08 (H) 11/19/2024    CREATININE 6.95 (H) 11/09/2024   As noted above, patient with a missed dialysis session this weekend.  Continue hemodialysis support per recommendations from nephrology.    11/20 - Patient feeling better today. Extra session of ultrafiltration ordered per nephrology. Will continue to monitor.

## 2024-11-20 NOTE — PLAN OF CARE
Problem: RESPIRATORY - ADULT  Goal: Achieves optimal ventilation and oxygenation  Description: INTERVENTIONS:  - Assess for changes in respiratory status  - Assess for changes in mentation and behavior  - Position to facilitate oxygenation and minimize respiratory effort  - Oxygen administered by appropriate delivery if ordered  - Initiate smoking cessation education as indicated  - Encourage broncho-pulmonary hygiene including cough, deep breathe, Incentive Spirometry  - Assess the need for suctioning and aspirate as needed  - Assess and instruct to report SOB or any respiratory difficulty  - Respiratory Therapy support as indicated  Outcome: Progressing     Problem: GENITOURINARY - ADULT  Goal: Maintains or returns to baseline urinary function  Description: INTERVENTIONS:  - Assess urinary function  - Encourage oral fluids to ensure adequate hydration if ordered  - Administer IV fluids as ordered to ensure adequate hydration  - Administer ordered medications as needed  - Offer frequent toileting  - Follow urinary retention protocol if ordered  Outcome: Progressing  Goal: Absence of urinary retention  Description: INTERVENTIONS:  - Assess patient’s ability to void and empty bladder  - Monitor I/O  - Bladder scan as needed  - Discuss with physician/AP medications to alleviate retention as needed  - Discuss catheterization for long term situations as appropriate  Outcome: Progressing  Goal: Urinary catheter remains patent  Description: INTERVENTIONS:  - Assess patency of urinary catheter  - If patient has a chronic gaitan, consider changing catheter if non-functioning  - Follow guidelines for intermittent irrigation of non-functioning urinary catheter  Outcome: Progressing     Problem: METABOLIC, FLUID AND ELECTROLYTES - ADULT  Goal: Electrolytes maintained within normal limits  Description: INTERVENTIONS:  - Monitor labs and assess patient for signs and symptoms of electrolyte imbalances  - Administer electrolyte  replacement as ordered  - Monitor response to electrolyte replacements, including repeat lab results as appropriate  - Instruct patient on fluid and nutrition as appropriate  Outcome: Progressing  Goal: Fluid balance maintained  Description: INTERVENTIONS:  - Monitor labs   - Monitor I/O and WT  - Instruct patient on fluid and nutrition as appropriate  - Assess for signs & symptoms of volume excess or deficit  Outcome: Progressing

## 2024-11-20 NOTE — ASSESSMENT & PLAN NOTE
Patient was seen and examined in the emergency department and will be admitted to the hospital for further evaluation and management.  Patient presenting with shortness of breath found to have evidence of acute hypoxic respiratory failure. Chest x-ray showing evidence of pulmonary edema with bilateral pleural effusion secondary to volume overload from acute on chronic diastolic congestive heart failure brought on by the missed dialysis session this weekend.  Fluid management with hemodialysis per nephrology.  Continue supplemental oxygen therapy as needed and wean as tolerated.    11/20 - Patient reports feeling significantly improved today.  Extra session of ultrafiltration ordered for today.  Follow-up with further recommendations from nephrology.

## 2024-11-20 NOTE — PROGRESS NOTES
Progress Note - Hospitalist   Name: Naresh Carpio 64 y.o. male I MRN: [id number]  Unit/Bed#: [address] [place] [address] Date of Admission: 11/19/2024   Date of Service: 11/20/2024 I Hospital Day: 1    Assessment & Plan  Acute hypoxic respiratory failure (HCC)  Patient was seen and examined in the emergency department and will be admitted to the hospital for further evaluation and management.  Patient presenting with shortness of breath found to have evidence of acute hypoxic respiratory failure. Chest x-ray showing evidence of pulmonary edema with bilateral pleural effusion secondary to volume overload from acute on chronic diastolic congestive heart failure brought on by the missed dialysis session this weekend.  Fluid management with hemodialysis per nephrology.  Continue supplemental oxygen therapy as needed and wean as tolerated.    11/20 - Patient reports feeling significantly improved today.  Extra session of ultrafiltration ordered for today.  Follow-up with further recommendations from nephrology.  Anemia due to chronic kidney disease, on chronic dialysis (HCC)  Lab Results   Component Value Date    EGFR 8 11/20/2024    EGFR 5 11/19/2024    EGFR 7 11/09/2024    CREATININE 6.18 (H) 11/20/2024    CREATININE 9.08 (H) 11/19/2024    CREATININE 6.95 (H) 11/09/2024     Type II diabetes mellitus ([facility])  Lab Results   Component Value Date    HGBA1C 5.5 10/08/2024       Recent Labs     11/19/24  2139 11/20/24  0721 11/20/24  1107 11/20/24  1609   POCGLU 145* 106 128 120       Blood Sugar Average: Last 72 hrs:  (P) 119.2  Patient will be placed on an insulin sliding scale.  Will continue to monitor blood sugar levels and make further adjustments as needed.  Hypertension  Continue home medications.  Will continue to monitor and make further adjustments as needed.  Chronic kidney disease-mineral and bone disorder  Lab Results   Component Value Date    EGFR 8 11/20/2024    EGFR 5 11/19/2024    EGFR 7 11/09/2024    CREATININE 6.18  (H) 11/20/2024    CREATININE 9.08 (H) 11/19/2024    CREATININE 6.95 (H) 11/09/2024     ESRD (end stage renal disease) ([facility])  Lab Results   Component Value Date    EGFR 8 11/20/2024    EGFR 5 11/19/2024    EGFR 7 11/09/2024    CREATININE 6.18 (H) 11/20/2024    CREATININE 9.08 (H) 11/19/2024    CREATININE 6.95 (H) 11/09/2024   As noted above, patient with a missed dialysis session this weekend.  Continue hemodialysis support per recommendations from nephrology.    11/20 - Patient feeling better today. Extra session of ultrafiltration ordered per nephrology. Will continue to monitor.  Paroxysmal atrial fibrillation ([facility])  Continue home medications.  Will continue to monitor.  Acute on chronic diastolic (congestive) heart failure ([facility])  Wt Readings from Last 3 Encounters:   11/19/24 106 kg (234 lb)   11/07/24 106 kg (233 lb 14.5 oz)   10/22/24 97.7 kg (215 lb 6.2 oz)     Chest x-ray showing evidence of pulmonary edema with bilateral pleural effusions secondary to volume overload from acute on chronic diastolic congestive heart failure brought on by his missed dialysis session this weekend.  Fluid management with hemodialysis per nephrology.  Will continue to monitor.    11/20 - Patient reports feeling significantly improved today.  Extra session of ultrafiltration ordered for today.  Follow-up with further recommendations from nephrology.  Pulmonary edema  Chest x-ray showing evidence of pulmonary edema with bilateral pleural effusion secondary to volume overload from acute on chronic diastolic congestive heart failure brought on by the missed dialysis session this weekend.  Fluid management with hemodialysis per nephrology.  Will continue to closely monitor.    11/20 - Patient reports feeling significantly improved today.  Extra session of ultrafiltration ordered for today.  Follow-up with further recommendations from nephrology.  Pleural effusion  Chest x-ray showing evidence of pulmonary edema with bilateral pleural  "effusion secondary to volume overload from acute on chronic diastolic congestive heart failure brought on by the missed dialysis session this weekend.  Fluid management with hemodialysis per nephrology.  Will continue to closely monitor.    11/20 - Patient reports feeling significantly improved today.  Extra session of ultrafiltration ordered for today.  Follow-up with further recommendations from nephrology.  Elevated troponin  Patient with elevated troponin levels likely secondary to demand ischemia from acute on chronic diastolic congestive heart failure from end-stage renal disease after a missed dialysis session.  Fluid management with hemodialysis per nephrology.  Will continue to monitor on telemetry.  Chronic wound      VTE Pharmacologic Prophylaxis:   Heparin prophylaxis.    Mobility:   Basic Mobility Inpatient Raw Score: 15  -[facility] Goal: 4: Move to chair/commode  -[facility] Achieved: 7: Walk 25 feet or more    Patient Centered Rounds: I performed bedside rounds with nursing staff today.     Current Length of Stay: 1 day(s)  Current Patient Status: Inpatient   Certification Statement: The patient will continue to require additional inpatient hospital stay due to above.  Discharge Plan: TBD.    Code Status: Level 1 - Full Code    Subjective   Patient seen and examined at bedside. No acute events overnight. Patient reports feeling \"better\" today. No chest pain or shortness of breath.    Objective :  Temp:  [96.9 °F (36.1 °C)-98.3 °F (36.8 °C)] 98.1 °F (36.7 °C)  HR:  [58-93] 74  BP: (107-150)/() 143/63  Resp:  [18-22] 18  SpO2:  [88 %-97 %] 94 %  O2 Device: Mid flow nasal cannula  Nasal Cannula O2 Flow Rate (L/min):  [4 L/min-7 L/min] 7 L/min    Body mass index is 31.74 kg/m².     Input and Output Summary (last 24 hours):     Intake/Output Summary (Last 24 hours) at 11/20/2024 1639  Last data filed at 11/20/2024 1050  Gross per 24 hour   Intake 800 ml   Output 7238 ml   Net -6438 ml       Physical " Exam  HENT:      Head: Normocephalic.      Mouth/Throat:      Mouth: Mucous membranes are moist.   Eyes:      Extraocular Movements: Extraocular movements intact.   Cardiovascular:      Rate and Rhythm: Normal rate.   Pulmonary:      Effort: Pulmonary effort is normal. No respiratory distress.   Abdominal:      Palpations: Abdomen is soft.      Tenderness: There is no abdominal tenderness.   Musculoskeletal:      Comments: LE edema   Skin:     General: Skin is warm.      Comments: Right foot sole wound   Neurological:      Mental Status: He is alert and oriented to person, place, and time.   Psychiatric:         Mood and Affect: Mood normal.       Lines/Drains:  Lines/Drains/Airways       Active Status       Name Placement date Placement time Site Days    HD Permanent Double Catheter --  --  Internal jugular  --                  Telemetry:  Telemetry Orders (From admission, onward)               24 Hour Telemetry Monitoring  Continuous x 24 Hours (Telem)        Expiring   Question:  Reason for 24 Hour Telemetry  Answer:  Decompensated CHF- and any one of the following: continuous diuretic infusion or total diuretic dose >200 mg daily, associated electrolyte derangement (I.e. K < 3.0), ionotropic drip (continuous infusion), hx of ventricular arrhythmia, or new EF < 35%                            Lab Results: I have reviewed the following results:   Results from last 7 days   Lab Units 11/20/24  0847   WBC Thousand/uL 7.61   HEMOGLOBIN g/dL 7.6*   HEMATOCRIT % 23.8*   PLATELETS Thousands/uL 252   SEGS PCT % 71   LYMPHO PCT % 14   MONO PCT % 9   EOS PCT % 6     Results from last 7 days   Lab Units 11/20/24  0847 11/19/24  1042   SODIUM mmol/L 132* 136   POTASSIUM mmol/L 4.4 4.9   CHLORIDE mmol/L 94* 100   CO2 mmol/L 29 23   BUN mg/dL 48* 74*   CREATININE mg/dL 6.18* 9.08*   ANION GAP mmol/L 9 13   CALCIUM mg/dL 8.3* 9.1   ALBUMIN g/dL  --  3.9   TOTAL BILIRUBIN mg/dL  --  0.38   ALK PHOS U/L  --  111*   ALT U/L  --  24    AST U/L  --  16   GLUCOSE RANDOM mg/dL 139 155*     Results from last 7 days   Lab Units 11/20/24  1609 11/20/24  1107 11/20/24  0721 11/19/24  2139 11/19/24  1545   POC GLUCOSE mg/dl 120 128 106 145* 97     Last 24 Hours Medication List:     Current Facility-Administered Medications:     acetaminophen (TYLENOL) tablet 650 mg, Q6H PRN    allopurinol (ZYLOPRIM) tablet 100 mg, Daily    aspirin chewable tablet 81 mg, Daily    atorvastatin (LIPITOR) tablet 80 mg, Daily    gabapentin (NEURONTIN) capsule 200 mg, BID    heparin (porcine) subcutaneous injection 5,000 Units, Q12H KEMAL    insulin lispro (HumALOG/ADMELOG) 100 units/mL subcutaneous injection 1-6 Units, TID AC **AND** Fingerstick Glucose (POCT), TID AC    insulin lispro (HumALOG/ADMELOG) 100 units/mL subcutaneous injection 1-6 Units, HS    NIFEdipine (PROCARDIA XL) 24 hr tablet 60 mg, After Dinner    oxyCODONE (ROXICODONE) split tablet 2.5 mg, Q8H PRN    polyethylene glycol (MIRALAX) packet 17 g, Daily PRN    Administrative Statements   Today, Patient Was Seen By: Milan Ruiz MD    **Please Note: This note may have been constructed using a voice recognition system.**

## 2024-11-20 NOTE — ASSESSMENT & PLAN NOTE
Chest x-ray on admission showing pulmonary edema with bilateral pleural effusions  Status post ultrafiltration on hemodialysis on 11/19  Extra session of ultrafiltration today  Continue to monitor respiratory status

## 2024-11-20 NOTE — ASSESSMENT & PLAN NOTE
Lab Results   Component Value Date    HGBA1C 5.5 10/08/2024       Recent Labs     11/19/24  2139 11/20/24  0721 11/20/24  1107 11/20/24  1609   POCGLU 145* 106 128 120       Blood Sugar Average: Last 72 hrs:  (P) 119.2  Patient will be placed on an insulin sliding scale.  Will continue to monitor blood sugar levels and make further adjustments as needed.

## 2024-11-20 NOTE — PROGRESS NOTES
NEPHROLOGY HOSPITAL PROGRESS NOTE   Naresh Carpio 64 y.o. male MRN: [id number]  Unit/Bed#: [address] [place] [address] Encounter: [id number]  Reason for Consult: ESRD on HD, volume overload  Assessment & Plan  Acute hypoxic respiratory failure (HCC)  Chest x-ray on admission showing pulmonary edema with bilateral pleural effusions  Status post ultrafiltration on hemodialysis on 11/19  Extra session of ultrafiltration today  Continue to monitor respiratory status  ESRD (end stage renal disease) (HCC)  TTS at [facility]  Missed HD on Saturday   kg  Last HD: 11/19  Next HD: 11/21  Extra session of ultrafiltration today  Access: Right chest wall permacath  Anemia due to chronic kidney disease, on chronic dialysis (HCC)  Hemoglobin 8.2 on admission  Monitor off SURINDER  Hypertension  Home Rx: Nifedipine 60 mg daily  Continue home Rx as blood pressure is acceptable  Continue to monitor blood pressure with ultrafiltration  Chronic kidney disease-mineral and bone disorder  Continue sevelamer with meals  Type II diabetes mellitus (HCC)  Management per primary team  Paroxysmal atrial fibrillation (HCC)  Management per primary team  Acute on chronic diastolic (congestive) heart failure (HCC)  Volume management with ultrafiltration on dialysis    Discussed with internal medicine team.  After discussion, we agreed that patient is feeling better after hemodialysis and to perform a session of ultrafiltration today for more volume removal.    SUBJECTIVE / 24H INTERVAL HISTORY:  Dyspnea has improved but still has labored breathing.  Continues to have leg swelling.  Also complains of burning with urination.    OBJECTIVE:  Current Weight: Weight - Scale: 106 kg (234 lb)  Vitals:    11/19/24 2247 11/20/24 0000 11/20/24 0325 11/20/24 0340   BP:  150/70  145/57   BP Location:  Left arm  Left arm   Pulse:  80  83   Resp:  18  20   Temp:  97.6 °F (36.4 °C)  97.8 °F (36.6 °C)   TempSrc:  Oral  Temporal   SpO2: 94% 96% 92% 92%   Weight:        Height:           Intake/Output Summary (Last 24 hours) at 11/20/2024 0626  Last data filed at 11/20/2024 0500  Gross per 24 hour   Intake 500 ml   Output 4262 ml   Net -3762 ml     Review of Systems   Constitutional:  Negative for chills and fever.   HENT:  Negative for ear pain and sore throat.    Eyes:  Negative for pain and visual disturbance.   Respiratory:  Negative for cough and shortness of breath.    Cardiovascular:  Negative for chest pain and palpitations.   Gastrointestinal:  Negative for abdominal pain and vomiting.   Genitourinary:  Positive for dysuria. Negative for hematuria.   Musculoskeletal:  Negative for arthralgias and back pain.   Skin:  Negative for color change and rash.   Neurological:  Negative for seizures and syncope.   All other systems reviewed and are negative.    Physical Exam  Vitals and nursing note reviewed.   Constitutional:       General: He is not in acute distress.     Appearance: He is well-developed.   HENT:      Head: Normocephalic and atraumatic.   Eyes:      Conjunctiva/sclera: Conjunctivae normal.   Cardiovascular:      Rate and Rhythm: Normal rate and regular rhythm.      Pulses: Normal pulses.      Heart sounds: Normal heart sounds. No murmur heard.     Comments: Right chest wall permacath present  Pulmonary:      Effort: Pulmonary effort is normal. No respiratory distress.      Comments: Decreased breath sounds bilaterally at bases  Abdominal:      Palpations: Abdomen is soft.      Tenderness: There is no abdominal tenderness.   Musculoskeletal:         General: No swelling.      Cervical back: Neck supple.      Right lower leg: Edema present.      Left lower leg: Edema present.   Skin:     General: Skin is warm and dry.      Capillary Refill: Capillary refill takes less than 2 seconds.   Neurological:      Mental Status: He is alert.   Psychiatric:         Mood and Affect: Mood normal.       Medications:    Current Facility-Administered Medications:     allopurinol  "(ZYLOPRIM) tablet 100 mg, 100 mg, Oral, Daily, Milan Ruiz MD, 100 mg at 11/19/24 1830    aspirin chewable tablet 81 mg, 81 mg, Oral, Daily, Milan Ruiz MD, 81 mg at 11/19/24 1829    atorvastatin (LIPITOR) tablet 80 mg, 80 mg, Oral, Daily, Milan Ruiz MD, 80 mg at 11/19/24 1830    gabapentin (NEURONTIN) capsule 200 mg, 200 mg, Oral, BID, Milan Ruiz MD, 200 mg at 11/19/24 1830    heparin (porcine) subcutaneous injection 5,000 Units, 5,000 Units, Subcutaneous, Q12H KEMAL, Milan Ruiz MD, 5,000 Units at 11/19/24 2142    insulin lispro (HumALOG/ADMELOG) 100 units/mL subcutaneous injection 1-6 Units, 1-6 Units, Subcutaneous, TID AC **AND** Fingerstick Glucose (POCT), , , TID AC, Milan Ruiz MD    insulin lispro (HumALOG/ADMELOG) 100 units/mL subcutaneous injection 1-6 Units, 1-6 Units, Subcutaneous, HS, Milan Ruiz MD    NIFEdipine (PROCARDIA XL) 24 hr tablet 60 mg, 60 mg, Oral, After Dinner, Milan Ruiz MD, 60 mg at 11/19/24 1842    polyethylene glycol (MIRALAX) packet 17 g, 17 g, Oral, Daily PRN, Milan Ruiz MD    Laboratory Results:  Results from last 7 days   Lab Units 11/19/24  1042   WBC Thousand/uL 9.52   HEMOGLOBIN g/dL 8.2*   HEMATOCRIT % 26.5*   PLATELETS Thousands/uL 270   POTASSIUM mmol/L 4.9   CHLORIDE mmol/L 100   CO2 mmol/L 23   BUN mg/dL 74*   CREATININE mg/dL 9.08*   CALCIUM mg/dL 9.1       Portions of the record may have been created with voice recognition software. Occasional wrong word or \"sound a like\" substitutions may have occurred due to the inherent limitations of voice recognition software. Read the chart carefully and recognize, using context, where substitutions have occurred.If you have any questions, please contact the dictating provider.    "

## 2024-11-20 NOTE — ASSESSMENT & PLAN NOTE
Chest x-ray showing evidence of pulmonary edema with bilateral pleural effusion secondary to volume overload from acute on chronic diastolic congestive heart failure brought on by the missed dialysis session this weekend.  Fluid management with hemodialysis per nephrology.  Will continue to closely monitor.    11/20 - Patient reports feeling significantly improved today.  Extra session of ultrafiltration ordered for today.  Follow-up with further recommendations from nephrology.

## 2024-11-20 NOTE — ASSESSMENT & PLAN NOTE
Home Rx: Nifedipine 60 mg daily  Continue home Rx as blood pressure is acceptable  Continue to monitor blood pressure with ultrafiltration

## 2024-11-21 ENCOUNTER — APPOINTMENT (INPATIENT)
Dept: RADIOLOGY | Facility: HOSPITAL | Age: 64
DRG: 291 | End: 2024-11-21
Payer: MEDICARE

## 2024-11-21 ENCOUNTER — APPOINTMENT (INPATIENT)
Dept: DIALYSIS | Facility: HOSPITAL | Age: 64
DRG: 291 | End: 2024-11-21
Attending: FAMILY MEDICINE
Payer: MEDICARE

## 2024-11-21 LAB
ALBUMIN SERPL BCG-MCNC: 3.6 G/DL (ref 3.5–5)
ALP SERPL-CCNC: 100 U/L (ref 34–104)
ALT SERPL W P-5'-P-CCNC: 18 U/L (ref 7–52)
ANION GAP SERPL CALCULATED.3IONS-SCNC: 9 MMOL/L (ref 4–13)
AST SERPL W P-5'-P-CCNC: 16 U/L (ref 13–39)
ATRIAL RATE: 80 BPM
BASOPHILS # BLD AUTO: 0.05 THOUSANDS/ÂΜL (ref 0–0.1)
BASOPHILS NFR BLD AUTO: 1 % (ref 0–1)
BILIRUB SERPL-MCNC: 0.39 MG/DL (ref 0.2–1)
BUN SERPL-MCNC: 59 MG/DL (ref 5–25)
CALCIUM SERPL-MCNC: 8.4 MG/DL (ref 8.4–10.2)
CHLORIDE SERPL-SCNC: 95 MMOL/L (ref 96–108)
CO2 SERPL-SCNC: 26 MMOL/L (ref 21–32)
CREAT SERPL-MCNC: 7.12 MG/DL (ref 0.6–1.3)
EOSINOPHIL # BLD AUTO: 0.46 THOUSAND/ÂΜL (ref 0–0.61)
EOSINOPHIL NFR BLD AUTO: 7 % (ref 0–6)
ERYTHROCYTE [DISTWIDTH] IN BLOOD BY AUTOMATED COUNT: 14.6 % (ref 11.6–15.1)
GFR SERPL CREATININE-BSD FRML MDRD: 7 ML/MIN/1.73SQ M
GLUCOSE SERPL-MCNC: 106 MG/DL (ref 65–140)
GLUCOSE SERPL-MCNC: 108 MG/DL (ref 65–140)
GLUCOSE SERPL-MCNC: 111 MG/DL (ref 65–140)
GLUCOSE SERPL-MCNC: 128 MG/DL (ref 65–140)
GLUCOSE SERPL-MCNC: 99 MG/DL (ref 65–140)
HCT VFR BLD AUTO: 25.1 % (ref 36.5–49.3)
HGB BLD-MCNC: 7.9 G/DL (ref 12–17)
IMM GRANULOCYTES # BLD AUTO: 0.02 THOUSAND/UL (ref 0–0.2)
IMM GRANULOCYTES NFR BLD AUTO: 0 % (ref 0–2)
LYMPHOCYTES # BLD AUTO: 1.1 THOUSANDS/ÂΜL (ref 0.6–4.47)
LYMPHOCYTES NFR BLD AUTO: 17 % (ref 14–44)
MAGNESIUM SERPL-MCNC: 2.1 MG/DL (ref 1.9–2.7)
MCH RBC QN AUTO: 30.2 PG (ref 26.8–34.3)
MCHC RBC AUTO-ENTMCNC: 31.5 G/DL (ref 31.4–37.4)
MCV RBC AUTO: 96 FL (ref 82–98)
MONOCYTES # BLD AUTO: 0.72 THOUSAND/ÂΜL (ref 0.17–1.22)
MONOCYTES NFR BLD AUTO: 11 % (ref 4–12)
MRSA NOSE QL CULT: ABNORMAL
MRSA NOSE QL CULT: ABNORMAL
NEUTROPHILS # BLD AUTO: 3.97 THOUSANDS/ÂΜL (ref 1.85–7.62)
NEUTS SEG NFR BLD AUTO: 64 % (ref 43–75)
NRBC BLD AUTO-RTO: 0 /100 WBCS
P AXIS: 12 DEGREES
PHOSPHATE SERPL-MCNC: 5.1 MG/DL (ref 2.3–4.1)
PLATELET # BLD AUTO: 242 THOUSANDS/UL (ref 149–390)
PMV BLD AUTO: 9.7 FL (ref 8.9–12.7)
POTASSIUM SERPL-SCNC: 4.9 MMOL/L (ref 3.5–5.3)
PR INTERVAL: 160 MS
PROT SERPL-MCNC: 6.8 G/DL (ref 6.4–8.4)
QRS AXIS: -4 DEGREES
QRSD INTERVAL: 72 MS
QT INTERVAL: 420 MS
QTC INTERVAL: 484 MS
RBC # BLD AUTO: 2.62 MILLION/UL (ref 3.88–5.62)
SODIUM SERPL-SCNC: 130 MMOL/L (ref 135–147)
T WAVE AXIS: 9 DEGREES
VENTRICULAR RATE: 80 BPM
WBC # BLD AUTO: 6.32 THOUSAND/UL (ref 4.31–10.16)

## 2024-11-21 PROCEDURE — 5A1D70Z PERFORMANCE OF URINARY FILTRATION, INTERMITTENT, LESS THAN 6 HOURS PER DAY: ICD-10-PCS | Performed by: INTERNAL MEDICINE

## 2024-11-21 PROCEDURE — 82948 REAGENT STRIP/BLOOD GLUCOSE: CPT

## 2024-11-21 PROCEDURE — 85025 COMPLETE CBC W/AUTO DIFF WBC: CPT | Performed by: FAMILY MEDICINE

## 2024-11-21 PROCEDURE — 84100 ASSAY OF PHOSPHORUS: CPT

## 2024-11-21 PROCEDURE — 94760 N-INVAS EAR/PLS OXIMETRY 1: CPT

## 2024-11-21 PROCEDURE — 83735 ASSAY OF MAGNESIUM: CPT | Performed by: FAMILY MEDICINE

## 2024-11-21 PROCEDURE — 99232 SBSQ HOSP IP/OBS MODERATE 35: CPT | Performed by: FAMILY MEDICINE

## 2024-11-21 PROCEDURE — 93010 ELECTROCARDIOGRAM REPORT: CPT | Performed by: INTERNAL MEDICINE

## 2024-11-21 PROCEDURE — 80053 COMPREHEN METABOLIC PANEL: CPT

## 2024-11-21 PROCEDURE — 99232 SBSQ HOSP IP/OBS MODERATE 35: CPT | Performed by: INTERNAL MEDICINE

## 2024-11-21 RX ORDER — SEVELAMER HYDROCHLORIDE 800 MG/1
800 TABLET, FILM COATED ORAL
Status: DISCONTINUED | OUTPATIENT
Start: 2024-11-21 | End: 2024-12-02 | Stop reason: HOSPADM

## 2024-11-21 RX ORDER — BENZONATATE 100 MG/1
100 CAPSULE ORAL 3 TIMES DAILY PRN
Status: DISCONTINUED | OUTPATIENT
Start: 2024-11-21 | End: 2024-12-02 | Stop reason: HOSPADM

## 2024-11-21 RX ORDER — HYDROXYZINE HYDROCHLORIDE 25 MG/1
25 TABLET, FILM COATED ORAL EVERY 6 HOURS PRN
Status: DISCONTINUED | OUTPATIENT
Start: 2024-11-21 | End: 2024-12-02 | Stop reason: HOSPADM

## 2024-11-21 RX ADMIN — NIFEDIPINE 60 MG: 30 TABLET, EXTENDED RELEASE ORAL at 17:34

## 2024-11-21 RX ADMIN — Medication 2.5 MG: at 09:24

## 2024-11-21 RX ADMIN — GABAPENTIN 200 MG: 100 CAPSULE ORAL at 17:34

## 2024-11-21 RX ADMIN — Medication 2.5 MG: at 17:34

## 2024-11-21 RX ADMIN — HEPARIN SODIUM 5000 UNITS: 5000 INJECTION, SOLUTION INTRAVENOUS; SUBCUTANEOUS at 21:16

## 2024-11-21 RX ADMIN — ALLOPURINOL 100 MG: 100 TABLET ORAL at 12:25

## 2024-11-21 RX ADMIN — HEPARIN SODIUM 5000 UNITS: 5000 INJECTION, SOLUTION INTRAVENOUS; SUBCUTANEOUS at 08:22

## 2024-11-21 RX ADMIN — ASPIRIN 81 MG CHEWABLE TABLET 81 MG: 81 TABLET CHEWABLE at 12:25

## 2024-11-21 RX ADMIN — GABAPENTIN 200 MG: 100 CAPSULE ORAL at 08:22

## 2024-11-21 RX ADMIN — SEVELAMER HYDROCHLORIDE 800 MG: 800 TABLET, FILM COATED ORAL at 08:22

## 2024-11-21 RX ADMIN — SEVELAMER HYDROCHLORIDE 800 MG: 800 TABLET, FILM COATED ORAL at 17:34

## 2024-11-21 RX ADMIN — ATORVASTATIN CALCIUM 80 MG: 80 TABLET, FILM COATED ORAL at 12:25

## 2024-11-21 NOTE — QUICK NOTE
Contacted via secure chat regarding shortness of breath reported by patient.  At bedside the patient is alert and oriented x 3, hemodynamically stable, in no acute distress.  Patient reports feeling shortness of breath at times but feeling significantly better than on admission.  The patient was admitted on 11/19/2024 due to acute hypoxic respiratory failure.  Mr. Carpio reports a new onset of pink/tinge sputum, etiology could be related due to volume overload/pulmonary edema with bilateral pleural effusion as seen on chest x-ray.  Patient also reports being concerned/anxious about current health status, Atarax every 6 hours as needed was added to treatment plan, reassurance was provided.  RN was made aware of changes and instructed to continue monitoring the patient closely.

## 2024-11-21 NOTE — ASSESSMENT & PLAN NOTE
Lab Results   Component Value Date    HGBA1C 5.5 10/08/2024       Recent Labs     11/20/24  1609 11/20/24 2024 11/21/24  0723 11/21/24  1146   POCGLU 120 114 106 99       Blood Sugar Average: Last 72 hrs:  (P) 114.375  Patient will be placed on an insulin sliding scale.  Will continue to monitor blood sugar levels and make further adjustments as needed.

## 2024-11-21 NOTE — ASSESSMENT & PLAN NOTE
Wt Readings from Last 3 Encounters:   11/19/24 106 kg (234 lb)   11/07/24 106 kg (233 lb 14.5 oz)   10/22/24 97.7 kg (215 lb 6.2 oz)     Chest x-ray showing evidence of pulmonary edema with bilateral pleural effusions secondary to volume overload from acute on chronic diastolic congestive heart failure brought on by his missed dialysis session this weekend.  Fluid management with hemodialysis per nephrology.  Will continue to monitor.    11/20 - Patient reports feeling significantly improved today.  Extra session of ultrafiltration ordered for today.  Follow-up with further recommendations from nephrology.    11/21 - Patient still with evidence of volume overload.  Discussed with nephrology with plans for ultrafiltration with hemodialysis today.

## 2024-11-21 NOTE — PLAN OF CARE
Post-Dialysis RN Treatment Note    Blood Pressure:  Pre:  141/60 mm/Hg  Post:  136/67 mmHg   EDW:  TBD   Weight:  Pre:  110.5 kg   Post: 106.4 kg   Mode of weight measurement: Standing Scale   Volume Removed:   4100 ml    Treatment duration:  240 minutes    NS given:  No    Treatment shortened:  No   Medications given during Rx: Oxycodone   Estimated Kt/V:  Not Applicable   Access type: Permacath/TDC   Needle Gauge: N/A   Access Issues: No    Report called to primary nurse:   Cecily Huggins    Problem: METABOLIC, FLUID AND ELECTROLYTES - ADULT  Goal: Electrolytes maintained within normal limits  Description: INTERVENTIONS:  - Monitor labs and assess patient for signs and symptoms of electrolyte imbalances  - Administer electrolyte replacement as ordered  - Monitor response to electrolyte replacements, including repeat lab results as appropriate  - Instruct patient on fluid and nutrition as appropriate  Outcome: Progressing  Goal: Fluid balance maintained  Description: INTERVENTIONS:  - Monitor labs   - Monitor I/O and WT  - Instruct patient on fluid and nutrition as appropriate  - Assess for signs & symptoms of volume excess or deficit  Outcome: Progressing

## 2024-11-21 NOTE — ASSESSMENT & PLAN NOTE
Lab Results   Component Value Date    EGFR 7 11/21/2024    EGFR 8 11/20/2024    EGFR 5 11/19/2024    CREATININE 7.12 (H) 11/21/2024    CREATININE 6.18 (H) 11/20/2024    CREATININE 9.08 (H) 11/19/2024   As noted above, patient with a missed dialysis session this weekend.  Continue hemodialysis support per recommendations from nephrology.    11/20 - Patient feeling better today. Extra session of ultrafiltration ordered per nephrology. Will continue to monitor.    11/21 - Patient still with evidence of volume overload.  Discussed with nephrology with plans for ultrafiltration with hemodialysis today.

## 2024-11-21 NOTE — ASSESSMENT & PLAN NOTE
TTS at [facility]  Missed HD on Saturday   kg  Last HD: 11/19  Last UF: 11/20  Next HD: Today  Access: Right chest wall permacath

## 2024-11-21 NOTE — CONSULTS
Podiatry - Consultation    Patient Information:   Naresh Carpio 64 y.o. male MRN: [id number]  Unit/Bed#: [address] [place] [address] Encounter: [id number]  PCP: Jeffrey Jackson  Date of Admission:  11/19/2024  Date of Consultation: 11/20/24  Requesting Physician: Milan Ruiz MD      ASSESSMENT:    Naresh Carpio is a 64 y.o. male with:    Bilateral venous stasis ulcerations  History of right partial first ray, left fifth digit amputations  Venous insufficiency  Type 2 diabetes with peripheral neuropathy  End-stage renal disease    PLAN:    Will plan for local wound care of the bilateral legs consisting of Dermagran to all open areas, dry sterile dressing, Ace bandage for moderate compression from the digits to the tibial tubercle  Follow-up new lower extremity arterial duplex  Patient is stable for discharge from podiatry standpoint, recommend close outpatient follow-up in wound care.  Wound care instructions placed.  Appreciate nursing assistance with dressing changes  Elevation and offloading on green foam wedges or pillows when non-ambulatory.  Rest of care per primary team.    Weightbearing status: Weightbearing as tolerated    SUBJECTIVE:    History of Present Illness:    Naresh Carpio is a 64 y.o. male who is originally admitted 11/19/2024 due to acute hypoxic respiratory failure. Patient has a past medical history of type 2 diabetes and end-stage renal disease on hemodialysis.    We are consulted for evaluation of the patient's bilateral lower extremities.  The patient states that his leg wounds occurred from a fall approximately 2 weeks ago.  He states that they have been healing up uneventfully since this time.  He denies any systemic signs of infection.    Review of Systems:    Constitutional: Negative.    HENT: Negative.    Eyes: Negative.    Respiratory: Negative.    Cardiovascular: Negative.    Gastrointestinal: Negative.    Musculoskeletal: Leg pain  Skin: Venous ulceration bilateral legs  Neurological:  Peripheral neuropathy  Psych: Negative.     Past Medical and Surgical History:     Past Medical History:   Diagnosis Date    Acute cystitis 10/18/2024    Acute on chronic anemia 01/23/2022    Atrial fibrillation (HCC)     Diabetes mellitus (HCC)     ESRD (end stage renal disease) on dialysis (HCC)     Hematuria 10/10/2024    Hematuria 10/10/2024    Hyperkalemia 04/06/2024    Hyperlipidemia     Hypertension     Left toe amputee (HCC)     TIA (transient ischemic attack)     Toxic metabolic encephalopathy 10/08/2024       Past Surgical History:   Procedure Laterality Date    AMPUTATION Left     left foot resection 10 years ago dr tran    IR LOWER EXTREMITY ANGIOGRAM  08/29/2023    IR TEMPORARY DIALYSIS CATHETER PLACEMENT  08/25/2023    IR TUNNELED CENTRAL LINE REMOVAL  08/23/2023    IR TUNNELED DIALYSIS CATHETER PLACEMENT  01/27/2022    IR TUNNELED DIALYSIS CATHETER PLACEMENT  08/30/2023    [place] AMPUTATION METATARSAL W/TOE SINGLE Right 8/31/2023    Procedure: RIGHT PARTIAL 1ST RAY RESECTION WITH  WOUND VAC APPLICATION;  Surgeon: Won Parmar DPM;  Location: [facility];  Service: Podiatry       Meds/Allergies:      Medications Prior to Admission:     aspirin 81 mg chewable tablet    atorvastatin (LIPITOR) 80 mg tablet    gabapentin (NEURONTIN) 100 mg capsule    NIFEdipine (PROCARDIA XL) 30 mg 24 hr tablet    allopurinol (ZYLOPRIM) 100 mg tablet    naloxone (NARCAN) 4 mg/0.1 mL nasal spray    pantoprazole (PROTONIX) 40 mg tablet    polyethylene glycol (MIRALAX) 17 g packet    sevelamer (RENAGEL) 800 mg tablet    No Known Allergies    Social History:     Marital Status:     Substance Use History:   Social History     Substance and Sexual Activity   Alcohol Use Not Currently    Alcohol/week: 0.0 standard drinks of alcohol    Comment: 0     Social History     Tobacco Use   Smoking Status Never    Passive exposure: Never   Smokeless Tobacco Never     Social History     Substance and Sexual Activity   Drug Use  Never       Family History:    History reviewed. No pertinent family history.      OBJECTIVE:    Vitals:   Blood Pressure: 138/65 (11/20/24 1948)  Pulse: 70 (11/20/24 1948)  Temperature: 97.9 °F (36.6 °C) (11/20/24 1946)  Temp Source: Oral (11/20/24 1516)  Respirations: 18 (11/20/24 1516)  Height: 6' (182.9 cm) (11/19/24 1303)  Weight - Scale: 106 kg (234 lb) (11/19/24 1303)  SpO2: 91 % (11/20/24 2024)    Physical Exam:    General Appearance: Alert, cooperative, no distress.  HEENT: Head normocephalic, atraumatic, without obvious abnormality.  Heart: Normal rate and rhythm.  Lungs: Non-labored breathing. No respiratory distress.  Abdomen: Without distension.  Psychiatric: AAOx3  Lower Extremity:    Vascular:   DP: Right: non-palpable Left: non-palpable  PT: Right: non-palpable Left: non-palpable  CRT < 3 seconds at the digits. +2/4 edema noted at bilateral lower extremities.   Pedal hair is absent.   Skin temperature is WNL bilaterally.    Musculoskeletal:  MMT is 4/5 in all muscle compartments bilaterally.   Pain on palpation of the bilateral leg.   No gross deformities noted.     Dermatological:  Superficial ulcerations to the pretibial area of the bilateral leg with surrounding erythema, warmth, and edema    Neurological:  Gross sensation is diminished.   Light touch is diminished.   Protective sensation is diminished.    Additional data:     Lab Results: I have personally reviewed pertinent labs including:    Results from last 7 days   Lab Units 11/20/24  0847   WBC Thousand/uL 7.61   HEMOGLOBIN g/dL 7.6*   HEMATOCRIT % 23.8*   PLATELETS Thousands/uL 252   SEGS PCT % 71   LYMPHO PCT % 14   MONO PCT % 9   EOS PCT % 6     Results from last 7 days   Lab Units 11/20/24  0847 11/19/24  1042   POTASSIUM mmol/L 4.4 4.9   CHLORIDE mmol/L 94* 100   CO2 mmol/L 29 23   BUN mg/dL 48* 74*   CREATININE mg/dL 6.18* 9.08*   CALCIUM mg/dL 8.3* 9.1   ALK PHOS U/L  --  111*   ALT U/L  --  24   AST U/L  --  16           Cultures: I  "have personally reviewed pertinent cultures including:              Imaging: I have personally reviewed pertinent reports in PACS.  EKG, Pathology, and Other Studies: I have personally reviewed pertinent reports.    Time Spent for Care: 30 minutes.  More than 50% of total time spent on counseling and coordination of care as described above.      ** Please Note: Portions of the record may have been created with voice recognition software. Occasional wrong word or \"sound a like\" substitutions may have occurred due to the inherent limitations of voice recognition software. Read the chart carefully and recognize, using context, where substitutions have occurred. **    "

## 2024-11-21 NOTE — NURSING NOTE
Pt refused to have bed alarm on; A&O X 4; Fall precautions,risks of fall explained;pt verbalized understanding  but still very adamant that he doesn't want the alarm on.Refused atarax saying he is not anxious and that he doesn't need it. Informed him that it is available as needed if decides to have it.

## 2024-11-21 NOTE — PROGRESS NOTES
Progress Note - Hospitalist   Name: Naresh Carpio 64 y.o. male I MRN: [id number]  Unit/Bed#: [address] [place] [address] Date of Admission: 11/19/2024   Date of Service: 11/21/2024 I Hospital Day: 2    Assessment & Plan  Acute hypoxic respiratory failure (HCC)  Patient was seen and examined in the emergency department and will be admitted to the hospital for further evaluation and management.  Patient presenting with shortness of breath found to have evidence of acute hypoxic respiratory failure. Chest x-ray showing evidence of pulmonary edema with bilateral pleural effusion secondary to volume overload from acute on chronic diastolic congestive heart failure brought on by the missed dialysis session this weekend.  Fluid management with hemodialysis per nephrology.  Continue supplemental oxygen therapy as needed and wean as tolerated.    11/20 - Patient reports feeling significantly improved today.  Extra session of ultrafiltration ordered for today.  Follow-up with further recommendations from nephrology.    11/21 - Patient still with evidence of volume overload.  Discussed with nephrology with plans for ultrafiltration with hemodialysis today.  Anemia due to chronic kidney disease, on chronic dialysis ([facility])  Lab Results   Component Value Date    EGFR 7 11/21/2024    EGFR 8 11/20/2024    EGFR 5 11/19/2024    CREATININE 7.12 (H) 11/21/2024    CREATININE 6.18 (H) 11/20/2024    CREATININE 9.08 (H) 11/19/2024     Type II diabetes mellitus ([facility])  Lab Results   Component Value Date    HGBA1C 5.5 10/08/2024       Recent Labs     11/20/24  1609 11/20/24 2024 11/21/24  0723 11/21/24  1146   POCGLU 120 114 106 99       Blood Sugar Average: Last 72 hrs:  (P) 114.375  Patient will be placed on an insulin sliding scale.  Will continue to monitor blood sugar levels and make further adjustments as needed.  Hypertension  Continue home medications.  Will continue to monitor and make further adjustments as needed.  Chronic kidney  disease-mineral and bone disorder  Lab Results   Component Value Date    EGFR 7 11/21/2024    EGFR 8 11/20/2024    EGFR 5 11/19/2024    CREATININE 7.12 (H) 11/21/2024    CREATININE 6.18 (H) 11/20/2024    CREATININE 9.08 (H) 11/19/2024     ESRD (end stage renal disease) (HCC)  Lab Results   Component Value Date    EGFR 7 11/21/2024    EGFR 8 11/20/2024    EGFR 5 11/19/2024    CREATININE 7.12 (H) 11/21/2024    CREATININE 6.18 (H) 11/20/2024    CREATININE 9.08 (H) 11/19/2024   As noted above, patient with a missed dialysis session this weekend.  Continue hemodialysis support per recommendations from nephrology.    11/20 - Patient feeling better today. Extra session of ultrafiltration ordered per nephrology. Will continue to monitor.    11/21 - Patient still with evidence of volume overload.  Discussed with nephrology with plans for ultrafiltration with hemodialysis today.  Paroxysmal atrial fibrillation (HCC)  Continue home medications.  Will continue to monitor.  Acute on chronic diastolic (congestive) heart failure (HCC)  Wt Readings from Last 3 Encounters:   11/19/24 106 kg (234 lb)   11/07/24 106 kg (233 lb 14.5 oz)   10/22/24 97.7 kg (215 lb 6.2 oz)     Chest x-ray showing evidence of pulmonary edema with bilateral pleural effusions secondary to volume overload from acute on chronic diastolic congestive heart failure brought on by his missed dialysis session this weekend.  Fluid management with hemodialysis per nephrology.  Will continue to monitor.    11/20 - Patient reports feeling significantly improved today.  Extra session of ultrafiltration ordered for today.  Follow-up with further recommendations from nephrology.    11/21 - Patient still with evidence of volume overload.  Discussed with nephrology with plans for ultrafiltration with hemodialysis today.  Pulmonary edema  Chest x-ray showing evidence of pulmonary edema with bilateral pleural effusion secondary to volume overload from acute on chronic  diastolic congestive heart failure brought on by the missed dialysis session this weekend.  Fluid management with hemodialysis per nephrology.  Will continue to closely monitor.    11/20 - Patient reports feeling significantly improved today.  Extra session of ultrafiltration ordered for today.  Follow-up with further recommendations from nephrology.    11/21 - Patient still with evidence of volume overload.  Discussed with nephrology with plans for ultrafiltration with hemodialysis today.  Pleural effusion  Chest x-ray showing evidence of pulmonary edema with bilateral pleural effusion secondary to volume overload from acute on chronic diastolic congestive heart failure brought on by the missed dialysis session this weekend.  Fluid management with hemodialysis per nephrology.  Will continue to closely monitor.    11/20 - Patient reports feeling significantly improved today.  Extra session of ultrafiltration ordered for today.  Follow-up with further recommendations from nephrology.    11/21 - Patient still with evidence of volume overload.  Discussed with nephrology with plans for ultrafiltration with hemodialysis today.  Elevated troponin  Patient with elevated troponin levels likely secondary to demand ischemia from acute on chronic diastolic congestive heart failure from end-stage renal disease after a missed dialysis session.  Fluid management with hemodialysis per nephrology.  Will continue to monitor on telemetry.  Chronic wound  Patient with evidence of bilateral venous stasis ulcerations and history of right partial first ray and left fifth digit amputations.  Patient was evaluated by podiatry with plans for local wound care.  Bilateral lower extremity arterial Dopplers also ordered for further evaluation.  Hyponatremia  Secondary to volume overload related to end-stage renal disease. Sodium 130 today.    VTE Pharmacologic Prophylaxis:   Heparin prophylaxis.    Mobility:   Basic Mobility Inpatient Raw  Score: 17  -HLM Goal: 5: Stand one or more mins  -HLM Achieved: 5: Stand (1 or more minutes)    Patient Centered Rounds: I performed bedside rounds with nursing staff today.     Current Length of Stay: 2 day(s)  Current Patient Status: Inpatient   Certification Statement: The patient will continue to require additional inpatient hospital stay due to above.  Discharge Plan: TBD.    Code Status: Level 1 - Full Code    Subjective   Patient seen and examined at bedside. No acute events overnight. No new complaints.    Objective :  Temp:  [97.7 °F (36.5 °C)-98.2 °F (36.8 °C)] 98 °F (36.7 °C)  HR:  [56-75] 64  BP: (132-164)/(60-72) 136/67  Resp:  [17-18] 18  SpO2:  [90 %-97 %] 93 %  O2 Device: Mid flow nasal cannula  Nasal Cannula O2 Flow Rate (L/min):  [4.5 L/min-8 L/min] 8 L/min    Body mass index is 31.74 kg/m².     Input and Output Summary (last 24 hours):     Intake/Output Summary (Last 24 hours) at 11/21/2024 1421  Last data filed at 11/21/2024 1236  Gross per 24 hour   Intake 560 ml   Output 4150 ml   Net -3590 ml       Physical Exam  HENT:      Head: Normocephalic.      Mouth/Throat:      Mouth: Mucous membranes are moist.   Eyes:      Extraocular Movements: Extraocular movements intact.   Cardiovascular:      Rate and Rhythm: Normal rate.   Pulmonary:      Effort: Pulmonary effort is normal. No respiratory distress.   Abdominal:      Palpations: Abdomen is soft.      Tenderness: There is no abdominal tenderness.   Musculoskeletal:      Comments: LE edema   Skin:     General: Skin is warm.      Comments: Right foot sole wound   Neurological:      Mental Status: He is alert and oriented to person, place, and time.   Psychiatric:         Mood and Affect: Mood normal.       Lines/Drains:  Lines/Drains/Airways       Active Status       Name Placement date Placement time Site Days    HD Permanent Double Catheter --  --  Internal jugular  --                  Telemetry:  Telemetry Orders (From admission, onward)                24 Hour Telemetry Monitoring  Continuous x 24 Hours (Telem)        Expiring   Question:  Reason for 24 Hour Telemetry  Answer:  Decompensated CHF- and any one of the following: continuous diuretic infusion or total diuretic dose >200 mg daily, associated electrolyte derangement (I.e. K < 3.0), ionotropic drip (continuous infusion), hx of ventricular arrhythmia, or new EF < 35%                            Lab Results: I have reviewed the following results:   Results from last 7 days   Lab Units 11/21/24  0600   WBC Thousand/uL 6.32   HEMOGLOBIN g/dL 7.9*   HEMATOCRIT % 25.1*   PLATELETS Thousands/uL 242   SEGS PCT % 64   LYMPHO PCT % 17   MONO PCT % 11   EOS PCT % 7*     Results from last 7 days   Lab Units 11/21/24  0600   SODIUM mmol/L 130*   POTASSIUM mmol/L 4.9   CHLORIDE mmol/L 95*   CO2 mmol/L 26   BUN mg/dL 59*   CREATININE mg/dL 7.12*   ANION GAP mmol/L 9   CALCIUM mg/dL 8.4   ALBUMIN g/dL 3.6   TOTAL BILIRUBIN mg/dL 0.39   ALK PHOS U/L 100   ALT U/L 18   AST U/L 16   GLUCOSE RANDOM mg/dL 108     Results from last 7 days   Lab Units 11/21/24  1146 11/21/24  0723 11/20/24  2024 11/20/24  1609 11/20/24  1107 11/20/24  0721 11/19/24  2139 11/19/24  1545   POC GLUCOSE mg/dl 99 106 114 120 128 106 145* 97     Last 24 Hours Medication List:     Current Facility-Administered Medications:     acetaminophen (TYLENOL) tablet 650 mg, Q6H PRN    allopurinol (ZYLOPRIM) tablet 100 mg, Daily    aspirin chewable tablet 81 mg, Daily    atorvastatin (LIPITOR) tablet 80 mg, Daily    benzonatate (TESSALON PERLES) capsule 100 mg, TID PRN    gabapentin (NEURONTIN) capsule 200 mg, BID    heparin (porcine) subcutaneous injection 5,000 Units, Q12H KEMAL    hydrOXYzine HCL (ATARAX) tablet 25 mg, Q6H PRN    insulin lispro (HumALOG/ADMELOG) 100 units/mL subcutaneous injection 1-6 Units, TID AC **AND** Fingerstick Glucose (POCT), TID AC    insulin lispro (HumALOG/ADMELOG) 100 units/mL subcutaneous injection 1-6 Units, HS     melatonin tablet 3 mg, HS PRN    NIFEdipine (PROCARDIA XL) 24 hr tablet 60 mg, After Dinner    oxyCODONE (ROXICODONE) split tablet 2.5 mg, Q8H PRN    polyethylene glycol (MIRALAX) packet 17 g, Daily PRN    sevelamer (RENAGEL) tablet 800 mg, TID With Meals    Administrative Statements   Today, Patient Was Seen By: Milan Ruiz MD    **Please Note: This note may have been constructed using a voice recognition system.**

## 2024-11-21 NOTE — PROGRESS NOTES
NEPHROLOGY HOSPITAL PROGRESS NOTE   Naresh Carpio 64 y.o. male MRN: [id number]  Unit/Bed#: [address] [place] [address] Encounter: [id number]  Reason for Consult: ESRD on HD, volume overload  Assessment & Plan  Acute hypoxic respiratory failure (HCC)  Chest x-ray on admission showing pulmonary edema with bilateral pleural effusions  Status post ultrafiltration on 11/19 and 11/20  Ultrafiltration with dialysis today  Continue to monitor respiratory status  ESRD (end stage renal disease) (HCC)  TTS at [facility]  Missed HD on Saturday   kg  Last HD: 11/19  Last UF: 11/20  Next HD: Today  Access: Right chest wall permacath  Anemia due to chronic kidney disease, on chronic dialysis (HCC)  Hemoglobin 7.9 today  Monitor off SURINDER  Hypertension  Home Rx: Nifedipine 60 mg daily  Continue home Rx as blood pressure is acceptable  Continue to monitor blood pressure with ultrafiltration  Chronic kidney disease-mineral and bone disorder  Phosphorus level today 5.1, start sevelamer with meals  Type II diabetes mellitus (HCC)  Management per primary team  Paroxysmal atrial fibrillation (HCC)  Management per primary team  Acute on chronic diastolic (congestive) heart failure (HCC)  Volume management with ultrafiltration on dialysis  Hyponatremia  Serum sodium level today 130  Fluid restricted 1200 cc per 24 hours  Dialysis with 138 mEq sodium bath today    Discussed with internal medicine team.  After discussion, we agreed that patient remains volume overloaded and to perform ultrafiltration on dialysis today.    SUBJECTIVE / 24H INTERVAL HISTORY:  Continues to complain of dyspnea and orthopnea.  Continues to have leg swelling.    OBJECTIVE:  Current Weight: Weight - Scale: 106 kg (234 lb)  Vitals:    11/20/24 1948 11/20/24 2024 11/20/24 2233 11/21/24 0205   BP: 138/65  136/65 134/64   BP Location:       Pulse: 70  67 66   Resp:   18 17   Temp:   97.7 °F (36.5 °C) 97.7 °F (36.5 °C)   TempSrc:       SpO2: 91% 91% 94% 91%   Weight:        Height:           Intake/Output Summary (Last 24 hours) at 11/21/2024 0632  Last data filed at 11/21/2024 0601  Gross per 24 hour   Intake 560 ml   Output 3126 ml   Net -2566 ml     Review of Systems   Constitutional:  Negative for chills and fever.   HENT:  Negative for ear pain and sore throat.    Eyes:  Negative for pain and visual disturbance.   Respiratory:  Positive for shortness of breath. Negative for cough.    Cardiovascular:  Negative for chest pain and palpitations.   Gastrointestinal:  Negative for abdominal pain and vomiting.   Genitourinary:  Negative for dysuria and hematuria.   Musculoskeletal:  Negative for arthralgias and back pain.   Skin:  Negative for color change and rash.   Neurological:  Negative for seizures and syncope.   All other systems reviewed and are negative.    Physical Exam  Vitals and nursing note reviewed.   Constitutional:       General: He is not in acute distress.     Appearance: He is well-developed.   HENT:      Head: Normocephalic and atraumatic.   Eyes:      Conjunctiva/sclera: Conjunctivae normal.   Cardiovascular:      Rate and Rhythm: Normal rate and regular rhythm.      Heart sounds: No murmur heard.  Pulmonary:      Effort: Pulmonary effort is normal. No respiratory distress.      Comments: Bilateral basal rales  Abdominal:      Palpations: Abdomen is soft.      Tenderness: There is no abdominal tenderness.   Musculoskeletal:         General: No swelling.      Cervical back: Neck supple.      Right lower leg: Edema present.      Left lower leg: Edema present.   Skin:     General: Skin is warm and dry.      Capillary Refill: Capillary refill takes less than 2 seconds.   Neurological:      Mental Status: He is alert.   Psychiatric:         Mood and Affect: Mood normal.       Medications:    Current Facility-Administered Medications:     acetaminophen (TYLENOL) tablet 650 mg, 650 mg, Oral, Q6H PRN, Milan Ruiz MD    allopurinol (ZYLOPRIM) tablet 100 mg, 100  mg, Oral, Daily, Milan Ruiz MD, 100 mg at 11/20/24 0907    aspirin chewable tablet 81 mg, 81 mg, Oral, Daily, Milan Ruiz MD, 81 mg at 11/20/24 0907    atorvastatin (LIPITOR) tablet 80 mg, 80 mg, Oral, Daily, Milan Ruiz MD, 80 mg at 11/20/24 0907    benzonatate (TESSALON PERLES) capsule 100 mg, 100 mg, Oral, TID PRN, GRANT Church    gabapentin (NEURONTIN) capsule 200 mg, 200 mg, Oral, BID, Milan Ruiz MD, 200 mg at 11/20/24 1733    heparin (porcine) subcutaneous injection 5,000 Units, 5,000 Units, Subcutaneous, Q12H KEMAL, Milan Ruiz MD, 5,000 Units at 11/20/24 2142    hydrOXYzine HCL (ATARAX) tablet 25 mg, 25 mg, Oral, Q6H PRN, GRANT Church    insulin lispro (HumALOG/ADMELOG) 100 units/mL subcutaneous injection 1-6 Units, 1-6 Units, Subcutaneous, TID AC **AND** Fingerstick Glucose (POCT), , , TID AC, Milan Ruiz MD    insulin lispro (HumALOG/ADMELOG) 100 units/mL subcutaneous injection 1-6 Units, 1-6 Units, Subcutaneous, HS, Milan Ruiz MD    melatonin tablet 3 mg, 3 mg, Oral, HS PRN, GRANT Church    NIFEdipine (PROCARDIA XL) 24 hr tablet 60 mg, 60 mg, Oral, After Dinner, Milan Ruiz MD, 60 mg at 11/20/24 1733    oxyCODONE (ROXICODONE) split tablet 2.5 mg, 2.5 mg, Oral, Q8H PRN, Milan Ruiz MD, 2.5 mg at 11/20/24 2142    polyethylene glycol (MIRALAX) packet 17 g, 17 g, Oral, Daily PRN, Milan Ruiz MD    Laboratory Results:  Results from last 7 days   Lab Units 11/21/24  0600 11/20/24  0847 11/19/24  1042   WBC Thousand/uL 6.32 7.61 9.52   HEMOGLOBIN g/dL 7.9* 7.6* 8.2*   HEMATOCRIT % 25.1* 23.8* 26.5*   PLATELETS Thousands/uL 242 252 270   POTASSIUM mmol/L  --  4.4 4.9   CHLORIDE mmol/L  --  94* 100   CO2 mmol/L  --  29 23   BUN mg/dL  --  48* 74*   CREATININE mg/dL  --  6.18* 9.08*   CALCIUM mg/dL  --  8.3* 9.1   MAGNESIUM mg/dL  --  2.0  --        Portions of the record may have been  "created with voice recognition software. Occasional wrong word or \"sound a like\" substitutions may have occurred due to the inherent limitations of voice recognition software. Read the chart carefully and recognize, using context, where substitutions have occurred.If you have any questions, please contact the dictating provider.    "

## 2024-11-21 NOTE — ASSESSMENT & PLAN NOTE
Serum sodium level today 130  Fluid restricted 1200 cc per 24 hours  Dialysis with 138 mEq sodium bath today

## 2024-11-21 NOTE — ASSESSMENT & PLAN NOTE
Lab Results   Component Value Date    EGFR 7 11/21/2024    EGFR 8 11/20/2024    EGFR 5 11/19/2024    CREATININE 7.12 (H) 11/21/2024    CREATININE 6.18 (H) 11/20/2024    CREATININE 9.08 (H) 11/19/2024

## 2024-11-21 NOTE — ASSESSMENT & PLAN NOTE
Patient with evidence of bilateral venous stasis ulcerations and history of right partial first ray and left fifth digit amputations.  Patient was evaluated by podiatry with plans for local wound care.  Bilateral lower extremity arterial Dopplers also ordered for further evaluation.

## 2024-11-21 NOTE — ASSESSMENT & PLAN NOTE
Patient was seen and examined in the emergency department and will be admitted to the hospital for further evaluation and management.  Patient presenting with shortness of breath found to have evidence of acute hypoxic respiratory failure. Chest x-ray showing evidence of pulmonary edema with bilateral pleural effusion secondary to volume overload from acute on chronic diastolic congestive heart failure brought on by the missed dialysis session this weekend.  Fluid management with hemodialysis per nephrology.  Continue supplemental oxygen therapy as needed and wean as tolerated.    11/20 - Patient reports feeling significantly improved today.  Extra session of ultrafiltration ordered for today.  Follow-up with further recommendations from nephrology.    11/21 - Patient still with evidence of volume overload.  Discussed with nephrology with plans for ultrafiltration with hemodialysis today.

## 2024-11-21 NOTE — ASSESSMENT & PLAN NOTE
Chest x-ray on admission showing pulmonary edema with bilateral pleural effusions  Status post ultrafiltration on 11/19 and 11/20  Ultrafiltration with dialysis today  Continue to monitor respiratory status

## 2024-11-21 NOTE — ASSESSMENT & PLAN NOTE
Chest x-ray showing evidence of pulmonary edema with bilateral pleural effusion secondary to volume overload from acute on chronic diastolic congestive heart failure brought on by the missed dialysis session this weekend.  Fluid management with hemodialysis per nephrology.  Will continue to closely monitor.    11/20 - Patient reports feeling significantly improved today.  Extra session of ultrafiltration ordered for today.  Follow-up with further recommendations from nephrology.    11/21 - Patient still with evidence of volume overload.  Discussed with nephrology with plans for ultrafiltration with hemodialysis today.

## 2024-11-22 ENCOUNTER — APPOINTMENT (INPATIENT)
Dept: RADIOLOGY | Facility: HOSPITAL | Age: 64
DRG: 291 | End: 2024-11-22
Payer: MEDICARE

## 2024-11-22 LAB
ANION GAP SERPL CALCULATED.3IONS-SCNC: 10 MMOL/L (ref 4–13)
BASOPHILS # BLD AUTO: 0.06 THOUSANDS/ÂΜL (ref 0–0.1)
BASOPHILS NFR BLD AUTO: 1 % (ref 0–1)
BUN SERPL-MCNC: 37 MG/DL (ref 5–25)
CALCIUM SERPL-MCNC: 8.4 MG/DL (ref 8.4–10.2)
CHLORIDE SERPL-SCNC: 91 MMOL/L (ref 96–108)
CO2 SERPL-SCNC: 28 MMOL/L (ref 21–32)
CREAT SERPL-MCNC: 5.04 MG/DL (ref 0.6–1.3)
EOSINOPHIL # BLD AUTO: 0.56 THOUSAND/ÂΜL (ref 0–0.61)
EOSINOPHIL NFR BLD AUTO: 8 % (ref 0–6)
ERYTHROCYTE [DISTWIDTH] IN BLOOD BY AUTOMATED COUNT: 14.5 % (ref 11.6–15.1)
GFR SERPL CREATININE-BSD FRML MDRD: 11 ML/MIN/1.73SQ M
GLUCOSE SERPL-MCNC: 103 MG/DL (ref 65–140)
GLUCOSE SERPL-MCNC: 134 MG/DL (ref 65–140)
GLUCOSE SERPL-MCNC: 136 MG/DL (ref 65–140)
GLUCOSE SERPL-MCNC: 139 MG/DL (ref 65–140)
GLUCOSE SERPL-MCNC: 89 MG/DL (ref 65–140)
HCT VFR BLD AUTO: 27.2 % (ref 36.5–49.3)
HGB BLD-MCNC: 8.6 G/DL (ref 12–17)
IMM GRANULOCYTES # BLD AUTO: 0.02 THOUSAND/UL (ref 0–0.2)
IMM GRANULOCYTES NFR BLD AUTO: 0 % (ref 0–2)
LYMPHOCYTES # BLD AUTO: 1.12 THOUSANDS/ÂΜL (ref 0.6–4.47)
LYMPHOCYTES NFR BLD AUTO: 17 % (ref 14–44)
MAGNESIUM SERPL-MCNC: 2 MG/DL (ref 1.9–2.7)
MCH RBC QN AUTO: 30.2 PG (ref 26.8–34.3)
MCHC RBC AUTO-ENTMCNC: 31.6 G/DL (ref 31.4–37.4)
MCV RBC AUTO: 95 FL (ref 82–98)
MONOCYTES # BLD AUTO: 0.72 THOUSAND/ÂΜL (ref 0.17–1.22)
MONOCYTES NFR BLD AUTO: 11 % (ref 4–12)
NEUTROPHILS # BLD AUTO: 4.32 THOUSANDS/ÂΜL (ref 1.85–7.62)
NEUTS SEG NFR BLD AUTO: 63 % (ref 43–75)
NRBC BLD AUTO-RTO: 0 /100 WBCS
PLATELET # BLD AUTO: 308 THOUSANDS/UL (ref 149–390)
PMV BLD AUTO: 10 FL (ref 8.9–12.7)
POTASSIUM SERPL-SCNC: 4.3 MMOL/L (ref 3.5–5.3)
RBC # BLD AUTO: 2.85 MILLION/UL (ref 3.88–5.62)
SODIUM SERPL-SCNC: 129 MMOL/L (ref 135–147)
WBC # BLD AUTO: 6.8 THOUSAND/UL (ref 4.31–10.16)

## 2024-11-22 PROCEDURE — 99232 SBSQ HOSP IP/OBS MODERATE 35: CPT | Performed by: STUDENT IN AN ORGANIZED HEALTH CARE EDUCATION/TRAINING PROGRAM

## 2024-11-22 PROCEDURE — 93925 LOWER EXTREMITY STUDY: CPT | Performed by: SURGERY

## 2024-11-22 PROCEDURE — 83735 ASSAY OF MAGNESIUM: CPT | Performed by: FAMILY MEDICINE

## 2024-11-22 PROCEDURE — 94760 N-INVAS EAR/PLS OXIMETRY 1: CPT

## 2024-11-22 PROCEDURE — 93922 UPR/L XTREMITY ART 2 LEVELS: CPT | Performed by: SURGERY

## 2024-11-22 PROCEDURE — 93923 UPR/LXTR ART STDY 3+ LVLS: CPT

## 2024-11-22 PROCEDURE — 97110 THERAPEUTIC EXERCISES: CPT

## 2024-11-22 PROCEDURE — 93925 LOWER EXTREMITY STUDY: CPT

## 2024-11-22 PROCEDURE — 80048 BASIC METABOLIC PNL TOTAL CA: CPT | Performed by: FAMILY MEDICINE

## 2024-11-22 PROCEDURE — 82948 REAGENT STRIP/BLOOD GLUCOSE: CPT

## 2024-11-22 PROCEDURE — 99232 SBSQ HOSP IP/OBS MODERATE 35: CPT | Performed by: FAMILY MEDICINE

## 2024-11-22 PROCEDURE — 85025 COMPLETE CBC W/AUTO DIFF WBC: CPT | Performed by: FAMILY MEDICINE

## 2024-11-22 RX ORDER — HYDROMORPHONE HCL IN WATER/PF 6 MG/30 ML
0.2 PATIENT CONTROLLED ANALGESIA SYRINGE INTRAVENOUS ONCE
Status: COMPLETED | OUTPATIENT
Start: 2024-11-22 | End: 2024-11-22

## 2024-11-22 RX ADMIN — SEVELAMER HYDROCHLORIDE 800 MG: 800 TABLET, FILM COATED ORAL at 09:10

## 2024-11-22 RX ADMIN — HEPARIN SODIUM 5000 UNITS: 5000 INJECTION, SOLUTION INTRAVENOUS; SUBCUTANEOUS at 22:23

## 2024-11-22 RX ADMIN — ATORVASTATIN CALCIUM 80 MG: 80 TABLET, FILM COATED ORAL at 09:10

## 2024-11-22 RX ADMIN — ALLOPURINOL 100 MG: 100 TABLET ORAL at 09:10

## 2024-11-22 RX ADMIN — NIFEDIPINE 60 MG: 30 TABLET, EXTENDED RELEASE ORAL at 17:37

## 2024-11-22 RX ADMIN — HEPARIN SODIUM 5000 UNITS: 5000 INJECTION, SOLUTION INTRAVENOUS; SUBCUTANEOUS at 09:10

## 2024-11-22 RX ADMIN — GABAPENTIN 200 MG: 100 CAPSULE ORAL at 17:37

## 2024-11-22 RX ADMIN — GABAPENTIN 200 MG: 100 CAPSULE ORAL at 09:10

## 2024-11-22 RX ADMIN — ASPIRIN 81 MG CHEWABLE TABLET 81 MG: 81 TABLET CHEWABLE at 09:10

## 2024-11-22 RX ADMIN — SEVELAMER HYDROCHLORIDE 800 MG: 800 TABLET, FILM COATED ORAL at 12:07

## 2024-11-22 RX ADMIN — HYDROMORPHONE HYDROCHLORIDE 0.2 MG: 0.2 INJECTION, SOLUTION INTRAMUSCULAR; INTRAVENOUS; SUBCUTANEOUS at 15:27

## 2024-11-22 RX ADMIN — Medication 3 MG: at 22:23

## 2024-11-22 RX ADMIN — Medication 2.5 MG: at 03:46

## 2024-11-22 RX ADMIN — Medication 2.5 MG: at 14:18

## 2024-11-22 RX ADMIN — Medication 2.5 MG: at 22:23

## 2024-11-22 RX ADMIN — SEVELAMER HYDROCHLORIDE 800 MG: 800 TABLET, FILM COATED ORAL at 17:37

## 2024-11-22 NOTE — PHYSICAL THERAPY NOTE
PT TREATMENT     11/22/24 1525   PT Last Visit   PT Visit Date 11/22/24   Note Type   Note Type Treatment   Pain Assessment   Pain Assessment Tool 0-10   Pain Score 8   Pain Location/Orientation Orientation: Left;Location: Foot   Restrictions/Precautions   Weight Bearing Precautions Per Order No   Other Precautions Contact/isolation;O2;Fall Risk;Pain  (neuropathies)   General   Chart Reviewed Yes   Family/Caregiver Present No   Cognition   Overall Cognitive Status WFL   Arousal/Participation Cooperative   Attention Attends with cues to redirect   Orientation Level Oriented X4   Following Commands Follows all commands and directions without difficulty   Subjective   Subjective Pt reports that his left foot hurts from neuropathies.  RN just gave pain meds, but still painful  RN Luisana aware.   Bed Mobility   Supine to Sit 7  Independent   Sit to Supine 7  Independent   Additional Comments Pt transfers in/out of bed independently   Transfers   Sit to Stand 7  Independent   Stand to Sit 7  Independent   Additional Comments Pt stands at RW to try to relieve pain, but does not want to walk at this time due to pain.   Exercises   Hip Flexion Sitting;20 reps;Bilateral   Knee AROM Long Arc Quad Sitting;20 reps;Bilateral   Ankle Pumps Sitting;10 reps;Bilateral   Marching Standing;20 reps;Bilateral  (with RW support)   Neuro re-ed Pt demonstrates his UE exercises that he does as HEP with a water bottle 3/4 filled.   Assessment   Prognosis Good   Problem List Decreased endurance;Impaired balance;Decreased mobility;Pain;Decreased skin integrity   Assessment Pt presents standing at RW in his room next to bed, trying to relieve pain in left foot.  Pt able to complete exercises as stated .  Tolerated well.  Cont. to progress.  Possible d/c tomorrow after dialysis.  The patient's AM-PAC Basic Mobility Inpatient Short Form Raw Score is 23. A Raw score of greater than 16 suggests the patient may benefit from discharge to home.  Please also refer to the recommendation of the Physical Therapist for safe discharge planning.   Plan   Treatment/Interventions LE strengthening/ROM;Therapeutic exercise;Endurance training;Spoke to nursing   Progress Progressing toward goals   PT Frequency 3-5x/wk   Discharge Recommendation   Rehab Resource Intensity Level, PT III (Minimum Resource Intensity)   AM-PAC Basic Mobility Inpatient   Turning in Flat Bed Without Bedrails 4   Lying on Back to Sitting on Edge of Flat Bed Without Bedrails 4   Moving Bed to Chair 4   Standing Up From Chair Using Arms 4   Walk in Room 4   Climb 3-5 Stairs With Railing 3   Basic Mobility Inpatient Raw Score 23   Basic Mobility Standardized Score 50.88   [facility] Highest Level Of Mobility   -HLM Goal 7: Walk 25 feet or more   JH-HLM Achieved 5: Stand (1 or more minutes)   End of Consult   Patient Position at End of Consult Seated edge of bed;All needs within reach   Licensure   [place] License Number  Darcy King PT  [id number]

## 2024-11-22 NOTE — ASSESSMENT & PLAN NOTE
Chest x-ray showing evidence of pulmonary edema with bilateral pleural effusion secondary to volume overload from acute on chronic diastolic congestive heart failure brought on by the missed dialysis session this weekend.  Fluid management with hemodialysis per nephrology.  Will continue to closely monitor.    11/20 - Patient reports feeling significantly improved today.  Extra session of ultrafiltration ordered for today.  Follow-up with further recommendations from nephrology.    11/21 - Patient still with evidence of volume overload.  Discussed with nephrology with plans for ultrafiltration with hemodialysis today.    11/22 - Improving. Plan for dialysis again tomorrow. Plan for discharge home tomorrow after dialysis.

## 2024-11-22 NOTE — ASSESSMENT & PLAN NOTE
Wt Readings from Last 3 Encounters:   11/19/24 106 kg (234 lb)   11/07/24 106 kg (233 lb 14.5 oz)   10/22/24 97.7 kg (215 lb 6.2 oz)     Chest x-ray showing evidence of pulmonary edema with bilateral pleural effusions secondary to volume overload from acute on chronic diastolic congestive heart failure brought on by his missed dialysis session this weekend.  Fluid management with hemodialysis per nephrology.  Will continue to monitor.    11/20 - Patient reports feeling significantly improved today.  Extra session of ultrafiltration ordered for today.  Follow-up with further recommendations from nephrology.    11/21 - Patient still with evidence of volume overload.  Discussed with nephrology with plans for ultrafiltration with hemodialysis today.    11/22 - Improving. Plan for dialysis again tomorrow. Plan for discharge home tomorrow after dialysis.

## 2024-11-22 NOTE — ASSESSMENT & PLAN NOTE
Lab Results   Component Value Date    EGFR 11 11/22/2024    EGFR 7 11/21/2024    EGFR 8 11/20/2024    CREATININE 5.04 (H) 11/22/2024    CREATININE 7.12 (H) 11/21/2024    CREATININE 6.18 (H) 11/20/2024   As noted above, patient with a missed dialysis session this weekend.  Continue hemodialysis support per recommendations from nephrology.    11/20 - Patient feeling better today. Extra session of ultrafiltration ordered per nephrology. Will continue to monitor.    11/21 - Patient still with evidence of volume overload.  Discussed with nephrology with plans for ultrafiltration with hemodialysis today.    11/22 - Improving. Plan for dialysis again tomorrow. Plan for discharge home tomorrow after dialysis.

## 2024-11-22 NOTE — ASSESSMENT & PLAN NOTE
Patient was seen and examined in the emergency department and will be admitted to the hospital for further evaluation and management.  Patient presenting with shortness of breath found to have evidence of acute hypoxic respiratory failure. Chest x-ray showing evidence of pulmonary edema with bilateral pleural effusion secondary to volume overload from acute on chronic diastolic congestive heart failure brought on by the missed dialysis session this weekend.  Fluid management with hemodialysis per nephrology.  Continue supplemental oxygen therapy as needed and wean as tolerated.    11/20 - Patient reports feeling significantly improved today.  Extra session of ultrafiltration ordered for today.  Follow-up with further recommendations from nephrology.    11/21 - Patient still with evidence of volume overload.  Discussed with nephrology with plans for ultrafiltration with hemodialysis today.    11/22 - Improving. Plan for dialysis again tomorrow. Plan for discharge home tomorrow after dialysis.

## 2024-11-22 NOTE — ASSESSMENT & PLAN NOTE
Lab Results   Component Value Date    EGFR 11 11/22/2024    EGFR 7 11/21/2024    EGFR 8 11/20/2024    CREATININE 5.04 (H) 11/22/2024    CREATININE 7.12 (H) 11/21/2024    CREATININE 6.18 (H) 11/20/2024

## 2024-11-22 NOTE — QUICK NOTE
Chart reviewed.  Vitals reviewed.  Labs reviewed.  Status post hemodialysis session with 4100 cc UF.  Next hemodialysis session will be tomorrow.  Please call with questions in interim.

## 2024-11-22 NOTE — PROGRESS NOTES
Progress Note - Hospitalist   Name: Naresh Carpio 64 y.o. male I MRN: [id number]  Unit/Bed#: [address] [place] [address] Date of Admission: 11/19/2024   Date of Service: 11/22/2024 I Hospital Day: 3    Assessment & Plan  Acute hypoxic respiratory failure (HCC)  Patient was seen and examined in the emergency department and will be admitted to the hospital for further evaluation and management.  Patient presenting with shortness of breath found to have evidence of acute hypoxic respiratory failure. Chest x-ray showing evidence of pulmonary edema with bilateral pleural effusion secondary to volume overload from acute on chronic diastolic congestive heart failure brought on by the missed dialysis session this weekend.  Fluid management with hemodialysis per nephrology.  Continue supplemental oxygen therapy as needed and wean as tolerated.    11/20 - Patient reports feeling significantly improved today.  Extra session of ultrafiltration ordered for today.  Follow-up with further recommendations from nephrology.    11/21 - Patient still with evidence of volume overload.  Discussed with nephrology with plans for ultrafiltration with hemodialysis today.    11/22 - Improving. Plan for dialysis again tomorrow. Plan for discharge home tomorrow after dialysis.  Anemia due to chronic kidney disease, on chronic dialysis ([facility])  Lab Results   Component Value Date    EGFR 11 11/22/2024    EGFR 7 11/21/2024    EGFR 8 11/20/2024    CREATININE 5.04 (H) 11/22/2024    CREATININE 7.12 (H) 11/21/2024    CREATININE 6.18 (H) 11/20/2024     Type II diabetes mellitus ([facility])  Lab Results   Component Value Date    HGBA1C 5.5 10/08/2024       Recent Labs     11/21/24  1634 11/21/24  2104 11/22/24  0742 11/22/24  1124   POCGLU 111 128 89 134       Blood Sugar Average: Last 72 hrs:  (P) 114.75  Patient will be placed on an insulin sliding scale.  Will continue to monitor blood sugar levels and make further adjustments as needed.  Hypertension  Continue home  medications.  Will continue to monitor and make further adjustments as needed.  Chronic kidney disease-mineral and bone disorder  Lab Results   Component Value Date    EGFR 11 11/22/2024    EGFR 7 11/21/2024    EGFR 8 11/20/2024    CREATININE 5.04 (H) 11/22/2024    CREATININE 7.12 (H) 11/21/2024    CREATININE 6.18 (H) 11/20/2024     ESRD (end stage renal disease) (HCC)  Lab Results   Component Value Date    EGFR 11 11/22/2024    EGFR 7 11/21/2024    EGFR 8 11/20/2024    CREATININE 5.04 (H) 11/22/2024    CREATININE 7.12 (H) 11/21/2024    CREATININE 6.18 (H) 11/20/2024   As noted above, patient with a missed dialysis session this weekend.  Continue hemodialysis support per recommendations from nephrology.    11/20 - Patient feeling better today. Extra session of ultrafiltration ordered per nephrology. Will continue to monitor.    11/21 - Patient still with evidence of volume overload.  Discussed with nephrology with plans for ultrafiltration with hemodialysis today.    11/22 - Improving. Plan for dialysis again tomorrow. Plan for discharge home tomorrow after dialysis.  Paroxysmal atrial fibrillation (HCC)  Continue home medications.  Will continue to monitor.  Acute on chronic diastolic (congestive) heart failure (HCC)  Wt Readings from Last 3 Encounters:   11/19/24 106 kg (234 lb)   11/07/24 106 kg (233 lb 14.5 oz)   10/22/24 97.7 kg (215 lb 6.2 oz)     Chest x-ray showing evidence of pulmonary edema with bilateral pleural effusions secondary to volume overload from acute on chronic diastolic congestive heart failure brought on by his missed dialysis session this weekend.  Fluid management with hemodialysis per nephrology.  Will continue to monitor.    11/20 - Patient reports feeling significantly improved today.  Extra session of ultrafiltration ordered for today.  Follow-up with further recommendations from nephrology.    11/21 - Patient still with evidence of volume overload.  Discussed with nephrology with plans  for ultrafiltration with hemodialysis today.    11/22 - Improving. Plan for dialysis again tomorrow. Plan for discharge home tomorrow after dialysis.  Pulmonary edema  Chest x-ray showing evidence of pulmonary edema with bilateral pleural effusion secondary to volume overload from acute on chronic diastolic congestive heart failure brought on by the missed dialysis session this weekend.  Fluid management with hemodialysis per nephrology.  Will continue to closely monitor.    11/20 - Patient reports feeling significantly improved today.  Extra session of ultrafiltration ordered for today.  Follow-up with further recommendations from nephrology.    11/21 - Patient still with evidence of volume overload.  Discussed with nephrology with plans for ultrafiltration with hemodialysis today.    11/22 - Improving. Plan for dialysis again tomorrow. Plan for discharge home tomorrow after dialysis.  Pleural effusion  Chest x-ray showing evidence of pulmonary edema with bilateral pleural effusion secondary to volume overload from acute on chronic diastolic congestive heart failure brought on by the missed dialysis session this weekend.  Fluid management with hemodialysis per nephrology.  Will continue to closely monitor.    11/20 - Patient reports feeling significantly improved today.  Extra session of ultrafiltration ordered for today.  Follow-up with further recommendations from nephrology.    11/21 - Patient still with evidence of volume overload.  Discussed with nephrology with plans for ultrafiltration with hemodialysis today.    11/22 - Improving. Plan for dialysis again tomorrow. Plan for discharge home tomorrow after dialysis.  Elevated troponin  Patient with elevated troponin levels likely secondary to demand ischemia from acute on chronic diastolic congestive heart failure from end-stage renal disease after a missed dialysis session.  Fluid management with hemodialysis per nephrology.  Will continue to monitor on  telemetry.  Chronic wound  Patient with evidence of bilateral venous stasis ulcerations and history of right partial first ray and left fifth digit amputations.  Patient was evaluated by podiatry with plans for local wound care.  Bilateral lower extremity arterial Dopplers also ordered for further evaluation.  Hyponatremia  Secondary to volume overload related to end-stage renal disease. Sodium 130 today.    VTE Pharmacologic Prophylaxis:   Heparin prophylaxis.    Mobility:   Basic Mobility Inpatient Raw Score: 17  -[facility] Goal: 5: Stand one or more mins  -HL Achieved: 5: Stand (1 or more minutes)    Patient Centered Rounds: I performed bedside rounds with nursing staff today.     Current Length of Stay: 3 day(s)  Current Patient Status: Inpatient   Certification Statement: The patient will continue to require additional inpatient hospital stay due to above.  Discharge Plan: TBD.    Code Status: Level 1 - Full Code    Subjective   Patient seen and examined at bedside. No acute events overnight. No new complaints.    Objective :  Temp:  [97.4 °F (36.3 °C)-98.2 °F (36.8 °C)] 97.5 °F (36.4 °C)  HR:  [60-74] 67  BP: (110-140)/(41-86) 110/41  Resp:  [16-18] 18  SpO2:  [90 %-99 %] 90 %  O2 Device: Nasal cannula  Nasal Cannula O2 Flow Rate (L/min):  [5 L/min-8 L/min] 5 L/min    Body mass index is 31.74 kg/m².     Input and Output Summary (last 24 hours):     Intake/Output Summary (Last 24 hours) at 11/22/2024 1205  Last data filed at 11/21/2024 1901  Gross per 24 hour   Intake 300 ml   Output 4100 ml   Net -3800 ml       Physical Exam  HENT:      Head: Normocephalic.      Mouth/Throat:      Mouth: Mucous membranes are moist.   Eyes:      Extraocular Movements: Extraocular movements intact.   Cardiovascular:      Rate and Rhythm: Normal rate.   Pulmonary:      Effort: Pulmonary effort is normal. No respiratory distress.   Abdominal:      Palpations: Abdomen is soft.      Tenderness: There is no abdominal tenderness.    Musculoskeletal:      Comments: LE edema   Skin:     General: Skin is warm.      Comments: Right foot sole wound   Neurological:      Mental Status: He is alert and oriented to person, place, and time.   Psychiatric:         Mood and Affect: Mood normal.       Lines/Drains:  Lines/Drains/Airways       Active Status       Name Placement date Placement time Site Days    HD Permanent Double Catheter --  --  Internal jugular  --                  Telemetry:  Telemetry Orders (From admission, onward)               24 Hour Telemetry Monitoring  Continuous x 24 Hours (Telem)        Expiring   Question:  Reason for 24 Hour Telemetry  Answer:  Decompensated CHF- and any one of the following: continuous diuretic infusion or total diuretic dose >200 mg daily, associated electrolyte derangement (I.e. K < 3.0), ionotropic drip (continuous infusion), hx of ventricular arrhythmia, or new EF < 35%                            Lab Results: I have reviewed the following results:   Results from last 7 days   Lab Units 11/22/24  0350   WBC Thousand/uL 6.80   HEMOGLOBIN g/dL 8.6*   HEMATOCRIT % 27.2*   PLATELETS Thousands/uL 308   SEGS PCT % 63   LYMPHO PCT % 17   MONO PCT % 11   EOS PCT % 8*     Results from last 7 days   Lab Units 11/22/24  0350 11/21/24  0600   SODIUM mmol/L 129* 130*   POTASSIUM mmol/L 4.3 4.9   CHLORIDE mmol/L 91* 95*   CO2 mmol/L 28 26   BUN mg/dL 37* 59*   CREATININE mg/dL 5.04* 7.12*   ANION GAP mmol/L 10 9   CALCIUM mg/dL 8.4 8.4   ALBUMIN g/dL  --  3.6   TOTAL BILIRUBIN mg/dL  --  0.39   ALK PHOS U/L  --  100   ALT U/L  --  18   AST U/L  --  16   GLUCOSE RANDOM mg/dL 103 108     Results from last 7 days   Lab Units 11/22/24  1124 11/22/24  0742 11/21/24  2104 11/21/24  1634 11/21/24  1146 11/21/24  0723 11/20/24  2024 11/20/24  1609 11/20/24  1107 11/20/24  0721 11/19/24  2139 11/19/24  1545   POC GLUCOSE mg/dl 134 89 128 111 99 106 114 120 128 106 145* 97     Last 24 Hours Medication List:     Current  Facility-Administered Medications:     acetaminophen (TYLENOL) tablet 650 mg, Q6H PRN    allopurinol (ZYLOPRIM) tablet 100 mg, Daily    aspirin chewable tablet 81 mg, Daily    atorvastatin (LIPITOR) tablet 80 mg, Daily    benzonatate (TESSALON PERLES) capsule 100 mg, TID PRN    gabapentin (NEURONTIN) capsule 200 mg, BID    heparin (porcine) subcutaneous injection 5,000 Units, Q12H KEMAL    hydrOXYzine HCL (ATARAX) tablet 25 mg, Q6H PRN    insulin lispro (HumALOG/ADMELOG) 100 units/mL subcutaneous injection 1-6 Units, TID AC **AND** Fingerstick Glucose (POCT), TID AC    insulin lispro (HumALOG/ADMELOG) 100 units/mL subcutaneous injection 1-6 Units, HS    melatonin tablet 3 mg, HS PRN    NIFEdipine (PROCARDIA XL) 24 hr tablet 60 mg, After Dinner    oxyCODONE (ROXICODONE) split tablet 2.5 mg, Q8H PRN    polyethylene glycol (MIRALAX) packet 17 g, Daily PRN    sevelamer (RENAGEL) tablet 800 mg, TID With Meals    Administrative Statements   Today, Patient Was Seen By: Milan Ruiz MD    **Please Note: This note may have been constructed using a voice recognition system.**

## 2024-11-22 NOTE — ASSESSMENT & PLAN NOTE
Lab Results   Component Value Date    HGBA1C 5.5 10/08/2024       Recent Labs     11/21/24  1634 11/21/24  2104 11/22/24  0742 11/22/24  1124   POCGLU 111 128 89 134       Blood Sugar Average: Last 72 hrs:  (P) 114.75  Patient will be placed on an insulin sliding scale.  Will continue to monitor blood sugar levels and make further adjustments as needed.

## 2024-11-22 NOTE — CASE MANAGEMENT
Case Management Assessment & Discharge Planning Note    Patient name Naresh Carpio  Location [address] [place] [facility]/[address] [place] [facility]-* MRN [id number]  : 1960 Date 2024       Current Admission Date: 2024  Current Admission Diagnosis:Acute hypoxic respiratory failure (HCC)   Patient Active Problem List    Diagnosis Date Noted Date Diagnosed    Chronic wound 2024     Acute hypoxic respiratory failure (HCC) 2024     Acute on chronic diastolic (congestive) heart failure ([facility]) 2024     Pulmonary edema 2024     Pleural effusion 2024     Elevated troponin 2024     Hyperphosphatemia 2024     Fall 2024     Melena 2024     Paroxysmal atrial fibrillation ([facility])      ESRD (end stage renal disease) ([facility]) 10/25/2024     Nausea 10/20/2024     Hyponatremia 10/19/2024     Chronic kidney disease-mineral and bone disorder 10/14/2024     Falls 10/08/2024     PVD (peripheral vascular disease) ([facility]) 10/08/2024     Closed fracture of right pelvis ([facility]) 2024     Bilateral sacral fracture, closed ([facility]) 2024     Hyperkalemia 2024     Difficulty with speech 2024     History of amputation of hallux ([facility]) 2024     Hypertensive urgency 2024     Facial cellulitis 2024     Constipation 2023     Bradycardia 2023     Cellulitis of right foot      Polymicrobial bacterial infection 2023     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 mellitus, with necrosis of bone ([facility])      Acquired deformity of foot, right      Charcot's joint      Subacute osteomyelitis of right foot ([facility])      Open wound of right foot 2023     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 mellitus, with bone involvement without evidence of necrosis ([facility])      Diabetic ulcer of left midfoot associated with type 2 diabetes mellitus, limited to breakdown of skin ([facility])      Diabetic polyneuropathy associated with type 2  diabetes mellitus (HCC)      Diabetes mellitus type 2 with peripheral artery disease (HCC)      History of amputation of lesser toe of left foot (HCC)      Onychomycosis      Status post fall 08/19/2023     Traumatic rhabdomyolysis (HCC) 08/19/2023     Leukocytosis 08/19/2023     Osteoarthritis of left hip 07/27/2022     Severe Left Orchalgia 07/26/2022     Right shoulder pain 07/26/2022     Left hip pain 07/06/2022     Hemodialysis status (HCC)      Hypervolemia      Anemia due to chronic kidney disease, on chronic dialysis (HCC) 01/21/2022     Type II diabetes mellitus (HCC)      Hypertension      History of TIA (transient ischemic attack)      T10 vertebral fracture (HCC)        LOS (days): 3  Geometric Mean LOS (GMLOS) (days): 4.4  Days to GMLOS:1.3     OBJECTIVE:  PATIENT READMITTED TO HOSPITAL  Risk of Unplanned Readmission Score: 35     Current admission status: Inpatient     Preferred Pharmacy:   [id number] - [facility] [address] [place] [address]  [address] [address]  Phone: [phone] Fax: [phone]    Primary Care Provider: Jeffrey Jackson    Primary Insurance: MEDICARE  Secondary Insurance:     ASSESSMENT:  Active Health Care Proxies    There are no active Health Care Proxies on file.    Readmission Root Cause  30 Day Readmission: Yes  During your hospital stay, did someone (provider, nurse, ) explain your care to you in a way you could understand?: Yes  Did you feel medically stable to leave the hospital?: Yes  Were you able to pay for your medication at the pharmacy?: Yes  Did you have reliable transportation to take you to your appointments?: Yes (Patient chooses to drive self)  During previous admission, was a post-acute recommendation made?: No  Patient was readmitted due to: Acute respiratory failure; Volume Overload  Action Plan: Medical management, nephrology consult, hemodialysis    Patient Information  Admitted from:: Home  Mental Status:  Alert  During Assessment patient was accompanied by: Not accompanied during assessment  Assessment information provided by:: Patient  Primary Caregiver: Self  Support Systems: Self, Family members  What city do you live in?: [place], [place]  Home entry access options. Select all that apply.: Stairs  Number of steps to enter home.: One Flight  Type of Current Residence: Apartment  Floor Level: 1  Living Arrangements: Lives Alone    Activities of Daily Living Prior to Admission  Functional Status: Independent  Completes ADLs independently?: Yes  Ambulates independently?: Yes  Does patient currently own DME?: Yes  What DME does the patient currently own?: [employer], [employer]  Does the patient have a history of Short-Term Rehab?: Yes  Does patient have a history of HHC?: No  Does patient currently have HHC?: No     Patient Information Continued  Income Source: Unemployed  Does patient have prescription coverage?: Yes  Does patient receive dialysis treatments?: Yes ([facility]; TTS; Drives self)     Means of Transportation  Means of Transport to Appts:: Drives Self    DISCHARGE DETAILS:    Discharge planning discussed with:: Patient  Freedom of Choice: Yes  Comments - Freedom of Choice: Patient choice is to discharge home and continue driving self to OP dialysis at [facility].     Were Treatment Team discharge recommendations reviewed with patient/caregiver?: Yes  Did patient/caregiver verbalize understanding of patient care needs?: Yes  Were patient/caregiver advised of the risks associated with not following Treatment Team discharge recommendations?: Yes    Discharge Destination Plan:: Home  Transport at Discharge : Wheelchair van     IMM Given (Date):: 11/22/24  IMM Given to:: Patient (IMM#2 reviewed with patient. Patient gave verbal understanding, signed, and was provided original. Copy placed in scan bin.)

## 2024-11-23 ENCOUNTER — APPOINTMENT (INPATIENT)
Dept: DIALYSIS | Facility: HOSPITAL | Age: 64
DRG: 291 | End: 2024-11-23
Attending: INTERNAL MEDICINE
Payer: MEDICARE

## 2024-11-23 LAB
ALBUMIN SERPL BCG-MCNC: 3.7 G/DL (ref 3.5–5)
ALP SERPL-CCNC: 95 U/L (ref 34–104)
ALT SERPL W P-5'-P-CCNC: 12 U/L (ref 7–52)
ANION GAP SERPL CALCULATED.3IONS-SCNC: 10 MMOL/L (ref 4–13)
AST SERPL W P-5'-P-CCNC: 12 U/L (ref 13–39)
ATRIAL RATE: 63 BPM
BILIRUB SERPL-MCNC: 0.32 MG/DL (ref 0.2–1)
BUN SERPL-MCNC: 50 MG/DL (ref 5–25)
CALCIUM SERPL-MCNC: 8 MG/DL (ref 8.4–10.2)
CARDIAC TROPONIN I PNL SERPL HS: 63 NG/L (ref 8–18)
CHLORIDE SERPL-SCNC: 94 MMOL/L (ref 96–108)
CO2 SERPL-SCNC: 27 MMOL/L (ref 21–32)
CREAT SERPL-MCNC: 6.61 MG/DL (ref 0.6–1.3)
ERYTHROCYTE [DISTWIDTH] IN BLOOD BY AUTOMATED COUNT: 14.4 % (ref 11.6–15.1)
GFR SERPL CREATININE-BSD FRML MDRD: 8 ML/MIN/1.73SQ M
GLUCOSE SERPL-MCNC: 127 MG/DL (ref 65–140)
GLUCOSE SERPL-MCNC: 146 MG/DL (ref 65–140)
GLUCOSE SERPL-MCNC: 92 MG/DL (ref 65–140)
GLUCOSE SERPL-MCNC: 95 MG/DL (ref 65–140)
GLUCOSE SERPL-MCNC: 98 MG/DL (ref 65–140)
HCT VFR BLD AUTO: 25.8 % (ref 36.5–49.3)
HGB BLD-MCNC: 8.2 G/DL (ref 12–17)
MCH RBC QN AUTO: 30 PG (ref 26.8–34.3)
MCHC RBC AUTO-ENTMCNC: 31.8 G/DL (ref 31.4–37.4)
MCV RBC AUTO: 95 FL (ref 82–98)
P AXIS: 5 DEGREES
PLATELET # BLD AUTO: 245 THOUSANDS/UL (ref 149–390)
PMV BLD AUTO: 9.7 FL (ref 8.9–12.7)
POTASSIUM SERPL-SCNC: 4.7 MMOL/L (ref 3.5–5.3)
PR INTERVAL: 192 MS
PROT SERPL-MCNC: 7 G/DL (ref 6.4–8.4)
QRS AXIS: -4 DEGREES
QRSD INTERVAL: 92 MS
QT INTERVAL: 474 MS
QTC INTERVAL: 486 MS
RBC # BLD AUTO: 2.73 MILLION/UL (ref 3.88–5.62)
SODIUM SERPL-SCNC: 131 MMOL/L (ref 135–147)
T WAVE AXIS: 85 DEGREES
VENTRICULAR RATE: 63 BPM
WBC # BLD AUTO: 6.04 THOUSAND/UL (ref 4.31–10.16)

## 2024-11-23 PROCEDURE — 80053 COMPREHEN METABOLIC PANEL: CPT

## 2024-11-23 PROCEDURE — 93005 ELECTROCARDIOGRAM TRACING: CPT

## 2024-11-23 PROCEDURE — 85027 COMPLETE CBC AUTOMATED: CPT

## 2024-11-23 PROCEDURE — 84484 ASSAY OF TROPONIN QUANT: CPT

## 2024-11-23 PROCEDURE — 90935 HEMODIALYSIS ONE EVALUATION: CPT | Performed by: INTERNAL MEDICINE

## 2024-11-23 PROCEDURE — 82948 REAGENT STRIP/BLOOD GLUCOSE: CPT

## 2024-11-23 PROCEDURE — 94760 N-INVAS EAR/PLS OXIMETRY 1: CPT

## 2024-11-23 PROCEDURE — 99232 SBSQ HOSP IP/OBS MODERATE 35: CPT | Performed by: NURSE PRACTITIONER

## 2024-11-23 PROCEDURE — 93010 ELECTROCARDIOGRAM REPORT: CPT | Performed by: INTERNAL MEDICINE

## 2024-11-23 RX ORDER — MAGNESIUM HYDROXIDE/ALUMINUM HYDROXICE/SIMETHICONE 120; 1200; 1200 MG/30ML; MG/30ML; MG/30ML
30 SUSPENSION ORAL ONCE
Status: COMPLETED | OUTPATIENT
Start: 2024-11-23 | End: 2024-11-23

## 2024-11-23 RX ORDER — OXYCODONE HYDROCHLORIDE 5 MG/1
5 TABLET ORAL EVERY 8 HOURS PRN
Refills: 0 | Status: DISCONTINUED | OUTPATIENT
Start: 2024-11-23 | End: 2024-12-02 | Stop reason: HOSPADM

## 2024-11-23 RX ADMIN — Medication 2.5 MG: at 04:50

## 2024-11-23 RX ADMIN — ALLOPURINOL 100 MG: 100 TABLET ORAL at 08:24

## 2024-11-23 RX ADMIN — Medication 2.5 MG: at 16:38

## 2024-11-23 RX ADMIN — SEVELAMER HYDROCHLORIDE 800 MG: 800 TABLET, FILM COATED ORAL at 16:39

## 2024-11-23 RX ADMIN — HEPARIN SODIUM 5000 UNITS: 5000 INJECTION, SOLUTION INTRAVENOUS; SUBCUTANEOUS at 20:21

## 2024-11-23 RX ADMIN — Medication 2.5 MG: at 15:07

## 2024-11-23 RX ADMIN — ATORVASTATIN CALCIUM 80 MG: 80 TABLET, FILM COATED ORAL at 08:24

## 2024-11-23 RX ADMIN — GABAPENTIN 200 MG: 100 CAPSULE ORAL at 08:24

## 2024-11-23 RX ADMIN — HYDROXYZINE HYDROCHLORIDE 25 MG: 25 TABLET ORAL at 04:50

## 2024-11-23 RX ADMIN — HEPARIN SODIUM 5000 UNITS: 5000 INJECTION, SOLUTION INTRAVENOUS; SUBCUTANEOUS at 08:24

## 2024-11-23 RX ADMIN — GABAPENTIN 200 MG: 100 CAPSULE ORAL at 17:25

## 2024-11-23 RX ADMIN — NIFEDIPINE 60 MG: 30 TABLET, EXTENDED RELEASE ORAL at 17:25

## 2024-11-23 RX ADMIN — ALUMINUM HYDROXIDE, MAGNESIUM HYDROXIDE, DIMETHICONE 30 ML: 400; 400; 40 SUSPENSION ORAL at 04:50

## 2024-11-23 RX ADMIN — OXYCODONE HYDROCHLORIDE 5 MG: 5 TABLET ORAL at 22:15

## 2024-11-23 RX ADMIN — ASPIRIN 81 MG CHEWABLE TABLET 81 MG: 81 TABLET CHEWABLE at 08:24

## 2024-11-23 NOTE — ASSESSMENT & PLAN NOTE
TTS at [facility]  Missed HD on Saturday   kg  Last HD: 11/21  Next HD: Today  Access: Right chest wall permacath    Patient seen and examined on hemodialysis.  Tolerating Rx.  4-hour treatment.  4 L UF.  Blood flow rate 450.  Dialysate flow rate 600.  2K potassium.  No meds to be administered.

## 2024-11-23 NOTE — PROGRESS NOTES
NEPHROLOGY HOSPITAL PROGRESS NOTE   Naresh Carpio 64 y.o. male MRN: [id number]  Unit/Bed#: [address] [place] [address] Encounter: [id number]  Reason for Consult: ESRD on HD, volume overload  Assessment & Plan  Acute hypoxic respiratory failure (HCC)  Chest x-ray on admission showing pulmonary edema with bilateral pleural effusions  Status post ultrafiltration during dialysis  Ultrafiltration with dialysis today, aim for 4 L UF  Continue to monitor respiratory status  ESRD (end stage renal disease) (HCC)  TTS at [facility]  Missed HD on Saturday   kg  Last HD: 11/21  Next HD: Today  Access: Right chest wall permacath    Patient seen and examined on hemodialysis.  Tolerating Rx.  4-hour treatment.  4 L UF.  Blood flow rate 450.  Dialysate flow rate 600.  2K potassium.  No meds to be administered.  Anemia due to chronic kidney disease, on chronic dialysis (HCC)  Hemoglobin 8.2 today, stable  Monitor off SURINDER  Hypertension  Home Rx: Nifedipine 60 mg daily  Continue home Rx as blood pressure is acceptable  Continue to monitor blood pressure with ultrafiltration  Monitor blood pressure with 4 L UF on dialysis today  Chronic kidney disease-mineral and bone disorder  Continue sevelamer with meals  Type II diabetes mellitus (HCC)  Management per primary team  Paroxysmal atrial fibrillation (HCC)  Management per primary team  Acute on chronic diastolic (congestive) heart failure (HCC)  Volume management with ultrafiltration on dialysis  We are aiming for 4 L UF on dialysis today  Hyponatremia  Serum sodium level today 131  Fluid restricted 1200 cc per 24 hours  Dialysis with 138 mEq sodium bath today  Ultrafilter 4 L on dialysis today    SUBJECTIVE / 24H INTERVAL HISTORY:  Had episode of difficulty with breathing overnight.  Seen and examined on hemodialysis.  Overall feeling better since admission but continues to have dyspnea.    OBJECTIVE:  Current Weight: Weight - Scale: 106 kg (234 lb)  Vitals:    11/23/24 0740 11/23/24  0745 11/23/24 0800 11/23/24 0830   BP: 135/62 128/60 133/64 134/63   BP Location: Left arm      Pulse: 62 60 64 64   Resp: 18 18 18 18   Temp: 97.6 °F (36.4 °C)      TempSrc: Oral      SpO2: 92%      Weight:       Height:           Intake/Output Summary (Last 24 hours) at 11/23/2024 0851  Last data filed at 11/23/2024 0745  Gross per 24 hour   Intake 1220 ml   Output 450 ml   Net 770 ml     Review of Systems   Constitutional:  Negative for chills and fever.   HENT:  Negative for ear pain and sore throat.    Eyes:  Negative for pain and visual disturbance.   Respiratory:  Positive for shortness of breath. Negative for cough.    Cardiovascular:  Negative for chest pain and palpitations.   Gastrointestinal:  Negative for abdominal pain and vomiting.   Genitourinary:  Negative for dysuria and hematuria.   Musculoskeletal:  Negative for arthralgias and back pain.   Skin:  Negative for color change and rash.   Neurological:  Negative for seizures and syncope.   All other systems reviewed and are negative.    Physical Exam  Vitals and nursing note reviewed.   Constitutional:       General: He is not in acute distress.     Appearance: He is well-developed.   HENT:      Head: Normocephalic and atraumatic.   Eyes:      Conjunctiva/sclera: Conjunctivae normal.   Cardiovascular:      Rate and Rhythm: Normal rate and regular rhythm.      Pulses: Normal pulses.      Heart sounds: Normal heart sounds. No murmur heard.     Comments: Right chest wall permacath currently in use for hemodialysis  Pulmonary:      Effort: Pulmonary effort is normal. No respiratory distress.      Comments: Diminished breath sounds bilaterally at bases  Abdominal:      Palpations: Abdomen is soft.      Tenderness: There is no abdominal tenderness.   Musculoskeletal:         General: No swelling.      Cervical back: Neck supple.      Right lower leg: Edema present.      Left lower leg: Edema present.   Skin:     General: Skin is warm and dry.       Capillary Refill: Capillary refill takes less than 2 seconds.   Neurological:      Mental Status: He is alert.   Psychiatric:         Mood and Affect: Mood normal.       Medications:    Current Facility-Administered Medications:     acetaminophen (TYLENOL) tablet 650 mg, 650 mg, Oral, Q6H PRN, Milan Ruiz MD    allopurinol (ZYLOPRIM) tablet 100 mg, 100 mg, Oral, Daily, Milan Ruiz MD, 100 mg at 11/23/24 0824    aspirin chewable tablet 81 mg, 81 mg, Oral, Daily, Milan Ruiz MD, 81 mg at 11/23/24 0824    atorvastatin (LIPITOR) tablet 80 mg, 80 mg, Oral, Daily, Milan Ruiz MD, 80 mg at 11/23/24 0824    benzonatate (TESSALON PERLES) capsule 100 mg, 100 mg, Oral, TID PRN, GRANT Church    gabapentin (NEURONTIN) capsule 200 mg, 200 mg, Oral, BID, Milan Ruiz MD, 200 mg at 11/23/24 0824    heparin (porcine) subcutaneous injection 5,000 Units, 5,000 Units, Subcutaneous, Q12H KEMAL, Milan Ruiz MD, 5,000 Units at 11/23/24 0824    hydrOXYzine HCL (ATARAX) tablet 25 mg, 25 mg, Oral, Q6H PRN, GRANT Church, 25 mg at 11/23/24 0450    insulin lispro (HumALOG/ADMELOG) 100 units/mL subcutaneous injection 1-6 Units, 1-6 Units, Subcutaneous, TID AC **AND** Fingerstick Glucose (POCT), , , TID AC, Milan Ruiz MD    insulin lispro (HumALOG/ADMELOG) 100 units/mL subcutaneous injection 1-6 Units, 1-6 Units, Subcutaneous, HS, Milan Ruiz MD    melatonin tablet 3 mg, 3 mg, Oral, HS PRN, GRANT Church, 3 mg at 11/22/24 2223    NIFEdipine (PROCARDIA XL) 24 hr tablet 60 mg, 60 mg, Oral, After Dinner, Milan Ruiz MD, 60 mg at 11/22/24 1737    oxyCODONE (ROXICODONE) split tablet 2.5 mg, 2.5 mg, Oral, Q8H PRN, Milan Ruiz MD, 2.5 mg at 11/23/24 2495    polyethylene glycol (MIRALAX) packet 17 g, 17 g, Oral, Daily PRN, Milan Ruiz MD    sevelamer (RENAGEL) tablet 800 mg, 800 mg, Oral, TID With Meals, German Davis  "MD Shaila, 800 mg at 11/22/24 1737    Laboratory Results:  Results from last 7 days   Lab Units 11/23/24  0454 11/22/24  0350 11/21/24  0600 11/20/24  0847 11/19/24  1042   WBC Thousand/uL 6.04 6.80 6.32 7.61 9.52   HEMOGLOBIN g/dL 8.2* 8.6* 7.9* 7.6* 8.2*   HEMATOCRIT % 25.8* 27.2* 25.1* 23.8* 26.5*   PLATELETS Thousands/uL 245 308 242 252 270   POTASSIUM mmol/L 4.7 4.3 4.9 4.4 4.9   CHLORIDE mmol/L 94* 91* 95* 94* 100   CO2 mmol/L 27 28 26 29 23   BUN mg/dL 50* 37* 59* 48* 74*   CREATININE mg/dL 6.61* 5.04* 7.12* 6.18* 9.08*   CALCIUM mg/dL 8.0* 8.4 8.4 8.3* 9.1   MAGNESIUM mg/dL  --  2.0 2.1 2.0  --    PHOSPHORUS mg/dL  --   --  5.1*  --   --        Portions of the record may have been created with voice recognition software. Occasional wrong word or \"sound a like\" substitutions may have occurred due to the inherent limitations of voice recognition software. Read the chart carefully and recognize, using context, where substitutions have occurred.If you have any questions, please contact the dictating provider.    "

## 2024-11-23 NOTE — PROGRESS NOTES
Progress Note - Hospitalist   Name: Naresh Carpio 64 y.o. male I MRN: [id number]  Unit/Bed#: [address] [place] [address] Date of Admission: 11/19/2024   Date of Service: 11/23/2024 I Hospital Day: 4    Assessment & Plan  Acute hypoxic respiratory failure (HCC)  Patient was seen and examined in the emergency department and will be admitted to the hospital for further evaluation and management.  Patient presenting with shortness of breath found to have evidence of acute hypoxic respiratory failure. Chest x-ray showing evidence of pulmonary edema with bilateral pleural effusion secondary to volume overload from acute on chronic diastolic congestive heart failure brought on by the missed dialysis session this weekend.  Fluid management with hemodialysis per nephrology.  Continue supplemental oxygen therapy as needed and wean as tolerated.    11/20 - Patient reports feeling significantly improved today.  Extra session of ultrafiltration ordered for today.  Follow-up with further recommendations from nephrology.    11/21 - Patient still with evidence of volume overload.  Discussed with nephrology with plans for ultrafiltration with hemodialysis today.    11/22 - Improving. Plan for dialysis again tomorrow. Plan for discharge home tomorrow after dialysis.    11/23 - Continue on oxygen and with shortness of breath, dialysis in process.  Not stable for discharge   Anemia due to chronic kidney disease, on chronic dialysis (HCC)  Lab Results   Component Value Date    EGFR 8 11/23/2024    EGFR 11 11/22/2024    EGFR 7 11/21/2024    CREATININE 6.61 (H) 11/23/2024    CREATININE 5.04 (H) 11/22/2024    CREATININE 7.12 (H) 11/21/2024     Type II diabetes mellitus (HCC)  Lab Results   Component Value Date    HGBA1C 5.5 10/08/2024       Recent Labs     11/22/24  1124 11/22/24  1600 11/22/24  2035 11/23/24  0711   POCGLU 134 136 139 92       Blood Sugar Average: Last 72 hrs:  (P) 115.8594125880743250  Patient will be placed on an insulin sliding  scale.  Will continue to monitor blood sugar levels and make further adjustments as needed.  Hypertension  Continue home medications.  Will continue to monitor and make further adjustments as needed.  Chronic kidney disease-mineral and bone disorder  Lab Results   Component Value Date    EGFR 8 11/23/2024    EGFR 11 11/22/2024    EGFR 7 11/21/2024    CREATININE 6.61 (H) 11/23/2024    CREATININE 5.04 (H) 11/22/2024    CREATININE 7.12 (H) 11/21/2024     ESRD (end stage renal disease) (HCC)  Lab Results   Component Value Date    EGFR 8 11/23/2024    EGFR 11 11/22/2024    EGFR 7 11/21/2024    CREATININE 6.61 (H) 11/23/2024    CREATININE 5.04 (H) 11/22/2024    CREATININE 7.12 (H) 11/21/2024   As noted above, patient with a missed dialysis session this weekend.  Continue hemodialysis support per recommendations from nephrology.    11/20 - Patient feeling better today. Extra session of ultrafiltration ordered per nephrology. Will continue to monitor.    11/21 - Patient still with evidence of volume overload.  Discussed with nephrology with plans for ultrafiltration with hemodialysis today.    11/22 - Improving. Plan for dialysis again tomorrow. Plan for discharge home tomorrow after dialysis.    11/23 - Patient continues to be short of breath when supine and with exertion, on 3L NC, not yet stable for discharge, dialysis in progress   Paroxysmal atrial fibrillation (HCC)  Continue home medications.  Will continue to monitor.  Acute on chronic diastolic (congestive) heart failure (HCC)  Wt Readings from Last 3 Encounters:   11/19/24 106 kg (234 lb)   11/07/24 106 kg (233 lb 14.5 oz)   10/22/24 97.7 kg (215 lb 6.2 oz)     Chest x-ray showing evidence of pulmonary edema with bilateral pleural effusions secondary to volume overload from acute on chronic diastolic congestive heart failure brought on by his missed dialysis session this weekend.  Fluid management with hemodialysis per nephrology.  Will continue to  monitor.    11/20 - Patient reports feeling significantly improved today.  Extra session of ultrafiltration ordered for today.  Follow-up with further recommendations from nephrology.    11/21 - Patient still with evidence of volume overload.  Discussed with nephrology with plans for ultrafiltration with hemodialysis today.    11/22 - Improving. Plan for dialysis again tomorrow. Plan for discharge home tomorrow after dialysis.    11/23 - Continue on oxygen and with shortness of breath, dialysis in process.  Not stable for discharge   Pulmonary edema  Chest x-ray showing evidence of pulmonary edema with bilateral pleural effusion secondary to volume overload from acute on chronic diastolic congestive heart failure brought on by the missed dialysis session this weekend.  Fluid management with hemodialysis per nephrology.  Will continue to closely monitor.    11/20 - Patient reports feeling significantly improved today.  Extra session of ultrafiltration ordered for today.  Follow-up with further recommendations from nephrology.    11/21 - Patient still with evidence of volume overload.  Discussed with nephrology with plans for ultrafiltration with hemodialysis today.    11/22 - Improving. Plan for dialysis again tomorrow. Plan for discharge home tomorrow after dialysis.    11/23 - Continue on oxygen and with shortness of breath, dialysis in process.  Not stable for discharge   Pleural effusion  Chest x-ray showing evidence of pulmonary edema with bilateral pleural effusion secondary to volume overload from acute on chronic diastolic congestive heart failure brought on by the missed dialysis session this weekend.  Fluid management with hemodialysis per nephrology.  Will continue to closely monitor.    11/20 - Patient reports feeling significantly improved today.  Extra session of ultrafiltration ordered for today.  Follow-up with further recommendations from nephrology.    11/21 - Patient still with evidence of volume  overload.  Discussed with nephrology with plans for ultrafiltration with hemodialysis today.    11/22 - Improving. Plan for dialysis again tomorrow. Plan for discharge home tomorrow after dialysis.    11/23 - Continue on oxygen and with shortness of breath, dialysis in process.  Not stable for discharge   Elevated troponin  Patient with elevated troponin levels likely secondary to demand ischemia from acute on chronic diastolic congestive heart failure from end-stage renal disease after a missed dialysis session.  Fluid management with hemodialysis per nephrology.  Will continue to monitor on telemetry.  Chronic wound  Patient with evidence of bilateral venous stasis ulcerations and history of right partial first ray and left fifth digit amputations.  Patient was evaluated by podiatry with plans for local wound care.  Bilateral lower extremity arterial Dopplers also ordered for further evaluation.  Hyponatremia  Secondary to volume overload related to end-stage renal disease. Sodium 131 today.    VTE Pharmacologic Prophylaxis:   Moderate Risk (Score 3-4) - Pharmacological DVT Prophylaxis Ordered: heparin.    Mobility:   Basic Mobility Inpatient Raw Score: 23  JH-HLM Goal: 7: Walk 25 feet or more  JH-HLM Achieved: 5: Stand (1 or more minutes)  JH-HLM Goal NOT achieved. Continue with multidisciplinary rounding and encourage appropriate mobility to improve upon JH-HLM goals.    Patient Centered Rounds: I performed bedside rounds with nursing staff today.   Discussions with Specialists or Other Care Team Provider:     Education and Discussions with Family / Patient:     Current Length of Stay: 4 day(s)  Current Patient Status: Inpatient   Certification Statement: The patient will continue to require additional inpatient hospital stay due to volume overload, shortness of breath   Discharge Plan: Anticipate discharge tomorrow to home.    Code Status: Level 1 - Full Code    Subjective   Patient laying in bed, states he  felt short of breath when he woke up this AM and had to sit up to catch his breath.  He continues to feel short of breath with exertion and is requiring supplemental oxygen.      Objective :  Temp:  [97.4 °F (36.3 °C)-97.7 °F (36.5 °C)] 97.6 °F (36.4 °C)  HR:  [58-66] 60  BP: (124-145)/(54-64) 130/60  Resp:  [18-22] 18  SpO2:  [92 %-99 %] 92 %  O2 Device: Nasal cannula  Nasal Cannula O2 Flow Rate (L/min):  [4 L/min-5 L/min] 4 L/min    Body mass index is 31.74 kg/m².     Input and Output Summary (last 24 hours):     Intake/Output Summary (Last 24 hours) at 11/23/2024 0906  Last data filed at 11/23/2024 0745  Gross per 24 hour   Intake 980 ml   Output 450 ml   Net 530 ml       Physical Exam  Vitals and nursing note reviewed.   Constitutional:       Appearance: Normal appearance.   HENT:      Head: Normocephalic.      Nose: Nose normal.   Eyes:      Extraocular Movements: Extraocular movements intact.   Cardiovascular:      Rate and Rhythm: Normal rate and regular rhythm.   Pulmonary:      Effort: Pulmonary effort is normal.      Breath sounds: Normal breath sounds.   Abdominal:      General: Abdomen is flat.      Palpations: Abdomen is soft.   Musculoskeletal:         General: Normal range of motion.   Skin:     General: Skin is warm and dry.   Neurological:      General: No focal deficit present.      Mental Status: He is alert and oriented to person, place, and time.           Lines/Drains:  Lines/Drains/Airways       Active Status       Name Placement date Placement time Site Days    HD Permanent Double Catheter --  --  Internal jugular  --                            Lab Results: I have reviewed the following results:   Results from last 7 days   Lab Units 11/23/24  0454 11/22/24  0350   WBC Thousand/uL 6.04 6.80   HEMOGLOBIN g/dL 8.2* 8.6*   HEMATOCRIT % 25.8* 27.2*   PLATELETS Thousands/uL 245 308   SEGS PCT %  --  63   LYMPHO PCT %  --  17   MONO PCT %  --  11   EOS PCT %  --  8*     Results from last 7 days    Lab Units 11/23/24  0454   SODIUM mmol/L 131*   POTASSIUM mmol/L 4.7   CHLORIDE mmol/L 94*   CO2 mmol/L 27   BUN mg/dL 50*   CREATININE mg/dL 6.61*   ANION GAP mmol/L 10   CALCIUM mg/dL 8.0*   ALBUMIN g/dL 3.7   TOTAL BILIRUBIN mg/dL 0.32   ALK PHOS U/L 95   ALT U/L 12   AST U/L 12*   GLUCOSE RANDOM mg/dL 95         Results from last 7 days   Lab Units 11/23/24  0711 11/22/24 2035 11/22/24  1600 11/22/24  1124 11/22/24  0742 11/21/24  2104 11/21/24  1634 11/21/24  1146 11/21/24  0723 11/20/24 2024 11/20/24  1609 11/20/24  1107   POC GLUCOSE mg/dl 92 139 136 134 89 128 111 99 106 114 120 128               Recent Cultures (last 7 days):               Last 24 Hours Medication List:     Current Facility-Administered Medications:     acetaminophen (TYLENOL) tablet 650 mg, Q6H PRN    allopurinol (ZYLOPRIM) tablet 100 mg, Daily    aspirin chewable tablet 81 mg, Daily    atorvastatin (LIPITOR) tablet 80 mg, Daily    benzonatate (TESSALON PERLES) capsule 100 mg, TID PRN    gabapentin (NEURONTIN) capsule 200 mg, BID    heparin (porcine) subcutaneous injection 5,000 Units, Q12H KEMAL    hydrOXYzine HCL (ATARAX) tablet 25 mg, Q6H PRN    insulin lispro (HumALOG/ADMELOG) 100 units/mL subcutaneous injection 1-6 Units, TID AC **AND** Fingerstick Glucose (POCT), TID AC    insulin lispro (HumALOG/ADMELOG) 100 units/mL subcutaneous injection 1-6 Units, HS    melatonin tablet 3 mg, HS PRN    NIFEdipine (PROCARDIA XL) 24 hr tablet 60 mg, After Dinner    oxyCODONE (ROXICODONE) split tablet 2.5 mg, Q8H PRN    polyethylene glycol (MIRALAX) packet 17 g, Daily PRN    sevelamer (RENAGEL) tablet 800 mg, TID With Meals    Administrative Statements   Today, Patient Was Seen By: GRANT Odom      **Please Note: This note may have been constructed using a voice recognition system.**

## 2024-11-23 NOTE — QUICK NOTE
I was notified patient was experiencing chest tightness upon inspiration. Upon my exam, patient states chest tightness which he describes as 6/10, non-radiating, and dull in nature. Patient states he was lying when this occurred and then upon sitting he felt better. During my assessment with patient, he was without any acute respiratory distress signs or symptoms, on 5L nc o2 with sat 97%. Patient VS stable. Patient appears warm and volume overload with pitting B/L LE edema, diminished lung sounds to bilateral bases. Chart reviewed with pulmonary edema and bilateral pleural effusions. Noted with elevated troponin on admission thought secondary to demand ischemia related to volume overload from acute on chronic CHF. Plan as below:  Stat EKG showing NSR, HR 63, QTc 485 with no ST/T changes or ischemia, similar when compared to EKG from 11/19.   Random troponin 63;  (trop 0 hr 311 -> 359 on admission 11/19)  Am labs today CBC, unremarkable. CMP with no acute metabolic derangements. Sodium improved since yesterday, Na+ 129 -> 131.  Ordered 1x dose maalox and for nursing to give prn oxy->patient states symptoms improved shortly after this was given.  Symptoms likely related to volume overload, he is due for dialysis today.

## 2024-11-23 NOTE — PLAN OF CARE
Pt on HD treatment for 4 hours with a UF goal of 2-3 L as tolerated. Pt. On a 2 k 2.5 basim bath for a potassium of 4.7 on 11/23/24.  Problem: METABOLIC, FLUID AND ELECTROLYTES - ADULT  Goal: Electrolytes maintained within normal limits  Description: INTERVENTIONS:  - Monitor labs and assess patient for signs and symptoms of electrolyte imbalances  - Administer electrolyte replacement as ordered  - Monitor response to electrolyte replacements, including repeat lab results as appropriate  - Instruct patient on fluid and nutrition as appropriate  Outcome: Progressing  Goal: Fluid balance maintained  Description: INTERVENTIONS:  - Monitor labs   - Monitor I/O and WT  - Instruct patient on fluid and nutrition as appropriate  - Assess for signs & symptoms of volume excess or deficit  Outcome: Progressing

## 2024-11-23 NOTE — ASSESSMENT & PLAN NOTE
Lab Results   Component Value Date    EGFR 8 11/23/2024    EGFR 11 11/22/2024    EGFR 7 11/21/2024    CREATININE 6.61 (H) 11/23/2024    CREATININE 5.04 (H) 11/22/2024    CREATININE 7.12 (H) 11/21/2024   As noted above, patient with a missed dialysis session this weekend.  Continue hemodialysis support per recommendations from nephrology.    11/20 - Patient feeling better today. Extra session of ultrafiltration ordered per nephrology. Will continue to monitor.    11/21 - Patient still with evidence of volume overload.  Discussed with nephrology with plans for ultrafiltration with hemodialysis today.    11/22 - Improving. Plan for dialysis again tomorrow. Plan for discharge home tomorrow after dialysis.    11/23 - Patient continues to be short of breath when supine and with exertion, on 3L NC, not yet stable for discharge, dialysis in progress

## 2024-11-23 NOTE — HEMODIALYSIS
Post-Dialysis RN Treatment Note    Blood Pressure:  Pre 135/62 mm/Hg  Post 136/54 mmHg   EDW:  108 kg    Weight:  Pre 107.4 kg   Post 103.6 kg   Mode of weight measurement: Bed Scale   Volume Removed:  3900 ml    Treatment duration: 233 minutes    NS given:  No    Treatment shortened Yes, describe: patient requested to end treatment 7 minutes early due to cramping   Medications given during Rx: None Reported   Estimated Kt/V:  None Reported   Access type: Permacath/TDC   Needle Gauge:  N/A   Access Issues: Yes, describe: Lines reversed due to elevated arterial pressures     Report called to primary nurse:   Yes Niharika MULLINS RN

## 2024-11-23 NOTE — ASSESSMENT & PLAN NOTE
Home Rx: Nifedipine 60 mg daily  Continue home Rx as blood pressure is acceptable  Continue to monitor blood pressure with ultrafiltration  Monitor blood pressure with 4 L UF on dialysis today

## 2024-11-23 NOTE — ASSESSMENT & PLAN NOTE
Serum sodium level today 131  Fluid restricted 1200 cc per 24 hours  Dialysis with 138 mEq sodium bath today  Ultrafilter 4 L on dialysis today

## 2024-11-23 NOTE — ASSESSMENT & PLAN NOTE
Patient was seen and examined in the emergency department and will be admitted to the hospital for further evaluation and management.  Patient presenting with shortness of breath found to have evidence of acute hypoxic respiratory failure. Chest x-ray showing evidence of pulmonary edema with bilateral pleural effusion secondary to volume overload from acute on chronic diastolic congestive heart failure brought on by the missed dialysis session this weekend.  Fluid management with hemodialysis per nephrology.  Continue supplemental oxygen therapy as needed and wean as tolerated.    11/20 - Patient reports feeling significantly improved today.  Extra session of ultrafiltration ordered for today.  Follow-up with further recommendations from nephrology.    11/21 - Patient still with evidence of volume overload.  Discussed with nephrology with plans for ultrafiltration with hemodialysis today.    11/22 - Improving. Plan for dialysis again tomorrow. Plan for discharge home tomorrow after dialysis.    11/23 - Continue on oxygen and with shortness of breath, dialysis in process.  Not stable for discharge

## 2024-11-23 NOTE — ASSESSMENT & PLAN NOTE
Wt Readings from Last 3 Encounters:   11/19/24 106 kg (234 lb)   11/07/24 106 kg (233 lb 14.5 oz)   10/22/24 97.7 kg (215 lb 6.2 oz)     Chest x-ray showing evidence of pulmonary edema with bilateral pleural effusions secondary to volume overload from acute on chronic diastolic congestive heart failure brought on by his missed dialysis session this weekend.  Fluid management with hemodialysis per nephrology.  Will continue to monitor.    11/20 - Patient reports feeling significantly improved today.  Extra session of ultrafiltration ordered for today.  Follow-up with further recommendations from nephrology.    11/21 - Patient still with evidence of volume overload.  Discussed with nephrology with plans for ultrafiltration with hemodialysis today.    11/22 - Improving. Plan for dialysis again tomorrow. Plan for discharge home tomorrow after dialysis.    11/23 - Continue on oxygen and with shortness of breath, dialysis in process.  Not stable for discharge

## 2024-11-23 NOTE — PLAN OF CARE
Problem: RESPIRATORY - ADULT  Goal: Achieves optimal ventilation and oxygenation  Description: INTERVENTIONS:  - Assess for changes in respiratory status  - Assess for changes in mentation and behavior  - Position to facilitate oxygenation and minimize respiratory effort  - Oxygen administered by appropriate delivery if ordered  - Initiate smoking cessation education as indicated  - Encourage broncho-pulmonary hygiene including cough, deep breathe, Incentive Spirometry  - Assess the need for suctioning and aspirate as needed  - Assess and instruct to report SOB or any respiratory difficulty  - Respiratory Therapy support as indicated  Outcome: Progressing     Problem: GENITOURINARY - ADULT  Goal: Maintains or returns to baseline urinary function  Description: INTERVENTIONS:  - Assess urinary function  - Encourage oral fluids to ensure adequate hydration if ordered  - Administer IV fluids as ordered to ensure adequate hydration  - Administer ordered medications as needed  - Offer frequent toileting  - Follow urinary retention protocol if ordered  Outcome: Progressing  Goal: Absence of urinary retention  Description: INTERVENTIONS:  - Assess patient’s ability to void and empty bladder  - Monitor I/O  - Bladder scan as needed  - Discuss with physician/AP medications to alleviate retention as needed  - Discuss catheterization for long term situations as appropriate  Outcome: Progressing  Goal: Urinary catheter remains patent  Description: INTERVENTIONS:  - Assess patency of urinary catheter  - If patient has a chronic gaitan, consider changing catheter if non-functioning  - Follow guidelines for intermittent irrigation of non-functioning urinary catheter  Outcome: Progressing     Problem: METABOLIC, FLUID AND ELECTROLYTES - ADULT  Goal: Electrolytes maintained within normal limits  Description: INTERVENTIONS:  - Monitor labs and assess patient for signs and symptoms of electrolyte imbalances  - Administer electrolyte  replacement as ordered  - Monitor response to electrolyte replacements, including repeat lab results as appropriate  - Instruct patient on fluid and nutrition as appropriate  Outcome: Progressing  Goal: Fluid balance maintained  Description: INTERVENTIONS:  - Monitor labs   - Monitor I/O and WT  - Instruct patient on fluid and nutrition as appropriate  - Assess for signs & symptoms of volume excess or deficit  Outcome: Progressing     Problem: Prexisting or High Potential for Compromised Skin Integrity  Goal: Skin integrity is maintained or improved  Description: INTERVENTIONS:  - Identify patients at risk for skin breakdown  - Assess and monitor skin integrity  - Assess and monitor nutrition and hydration status  - Monitor labs   - Assess for incontinence   - Turn and reposition patient  - Assist with mobility/ambulation  - Relieve pressure over bony prominences  - Avoid friction and shearing  - Provide appropriate hygiene as needed including keeping skin clean and dry  - Evaluate need for skin moisturizer/barrier cream  - Collaborate with interdisciplinary team   - Patient/family teaching  - Consider wound care consult   Outcome: Progressing

## 2024-11-23 NOTE — ASSESSMENT & PLAN NOTE
Chest x-ray on admission showing pulmonary edema with bilateral pleural effusions  Status post ultrafiltration during dialysis  Ultrafiltration with dialysis today, aim for 4 L UF  Continue to monitor respiratory status

## 2024-11-23 NOTE — ASSESSMENT & PLAN NOTE
Lab Results   Component Value Date    HGBA1C 5.5 10/08/2024       Recent Labs     11/22/24  1124 11/22/24  1600 11/22/24 2035 11/23/24  0711   POCGLU 134 136 139 92       Blood Sugar Average: Last 72 hrs:  (P) 115.0877994587517410  Patient will be placed on an insulin sliding scale.  Will continue to monitor blood sugar levels and make further adjustments as needed.

## 2024-11-23 NOTE — ASSESSMENT & PLAN NOTE
Lab Results   Component Value Date    EGFR 8 11/23/2024    EGFR 11 11/22/2024    EGFR 7 11/21/2024    CREATININE 6.61 (H) 11/23/2024    CREATININE 5.04 (H) 11/22/2024    CREATININE 7.12 (H) 11/21/2024

## 2024-11-23 NOTE — PROGRESS NOTES
Podiatry - Progress Note  Patient: Naresh Carpio 64 y.o. male   MRN: [id number]  PCP: Jeffrey Jackson  Unit/Bed#: [address] [place] [address] Encounter: [id number]  Date Of Visit: 24    ASSESSMENT:    Naresh Carpio is a 64 y.o. male with:    Bilateral venous stasis ulcerations  History of right partial first ray, left fifth digit amputations  Venous insufficiency  Type 2 diabetes with peripheral neuropathy  End-stage renal disease      PLAN:    Plan for local wound care to the bilateral lower extremities.  Reviewed most recent lower extremity arterial duplexFrom 2024: At least 50% stenosis in the bilateral proximal left heel artery.  Greater than 75% stenosis in the proximal tibial peroneal trunk of the right lower extremity noted.  Great toe pressures within the healing range bilaterally.  Patient is stable for discharge from podiatry standpoint  Continue local wound care, appreciate nursing assistance with dressing changes.  Elevation and offloading on green foam wedges or pillows when non-ambulatory.  Rest of care per primary team.     Weightbearing status: Weightbearing as tolerated    SUBJECTIVE:     The patient was seen, evaluated, and assessed at bedside today. The patient was awake, alert, and in no acute distress. No acute events overnight. The patient reports no pain to his bilateral lower extremities. Patient denies N/V/F/chills/SOB/CP.      OBJECTIVE:     Vitals:   /61   Pulse 60   Temp 97.7 °F (36.5 °C)   Resp 18   Ht 6' (1.829 m)   Wt 106 kg (234 lb)   SpO2 99%   BMI 31.74 kg/m²     Temp (24hrs), Av.6 °F (36.4 °C), Min:97.5 °F (36.4 °C), Max:97.7 °F (36.5 °C)      Physical Exam:     General: Alert, cooperative and no distress  Lungs: Non labored breathing  Abdomen: Soft, non-tender.  Lower extremity exam:  Pedal pulses absent. Light touch is diminished. Pain on palpation negative.  No calf tenderness noted.     Superficial ulcerations to the pretibial area of the bilateral leg with  "surrounding erythema, warmth, and edema       Additional Data:     Labs:    Results from last 7 days   Lab Units 11/22/24  0350   WBC Thousand/uL 6.80   HEMOGLOBIN g/dL 8.6*   HEMATOCRIT % 27.2*   PLATELETS Thousands/uL 308   SEGS PCT % 63   LYMPHO PCT % 17   MONO PCT % 11   EOS PCT % 8*     Results from last 7 days   Lab Units 11/22/24  0350 11/21/24  0600   POTASSIUM mmol/L 4.3 4.9   CHLORIDE mmol/L 91* 95*   CO2 mmol/L 28 26   BUN mg/dL 37* 59*   CREATININE mg/dL 5.04* 7.12*   CALCIUM mg/dL 8.4 8.4   ALK PHOS U/L  --  100   ALT U/L  --  18   AST U/L  --  16           * I Have Reviewed All Lab Data Listed Above.    Recent Cultures (last 7 days):               Imaging: I have personally reviewed pertinent films in PACS  EKG, Pathology, and Other Studies: I have personally reviewed pertinent reports.    ** Please Note: Portions of the record may have been created with voice recognition software. Occasional wrong word or \"sound a like\" substitutions may have occurred due to the inherent limitations of voice recognition software. Read the chart carefully and recognize, using context, where substitutions have occurred. **      "

## 2024-11-23 NOTE — ASSESSMENT & PLAN NOTE
Chest x-ray showing evidence of pulmonary edema with bilateral pleural effusion secondary to volume overload from acute on chronic diastolic congestive heart failure brought on by the missed dialysis session this weekend.  Fluid management with hemodialysis per nephrology.  Will continue to closely monitor.    11/20 - Patient reports feeling significantly improved today.  Extra session of ultrafiltration ordered for today.  Follow-up with further recommendations from nephrology.    11/21 - Patient still with evidence of volume overload.  Discussed with nephrology with plans for ultrafiltration with hemodialysis today.    11/22 - Improving. Plan for dialysis again tomorrow. Plan for discharge home tomorrow after dialysis.    11/23 - Continue on oxygen and with shortness of breath, dialysis in process.  Not stable for discharge

## 2024-11-24 LAB
ANION GAP SERPL CALCULATED.3IONS-SCNC: 9 MMOL/L (ref 4–13)
BASOPHILS # BLD AUTO: 0.05 THOUSANDS/ΜL (ref 0–0.1)
BASOPHILS NFR BLD AUTO: 1 % (ref 0–1)
BUN SERPL-MCNC: 32 MG/DL (ref 5–25)
CALCIUM SERPL-MCNC: 8 MG/DL (ref 8.4–10.2)
CHLORIDE SERPL-SCNC: 93 MMOL/L (ref 96–108)
CO2 SERPL-SCNC: 27 MMOL/L (ref 21–32)
CREAT SERPL-MCNC: 4.75 MG/DL (ref 0.6–1.3)
EOSINOPHIL # BLD AUTO: 0.65 THOUSAND/ΜL (ref 0–0.61)
EOSINOPHIL NFR BLD AUTO: 11 % (ref 0–6)
ERYTHROCYTE [DISTWIDTH] IN BLOOD BY AUTOMATED COUNT: 14.5 % (ref 11.6–15.1)
GFR SERPL CREATININE-BSD FRML MDRD: 11 ML/MIN/1.73SQ M
GLUCOSE SERPL-MCNC: 102 MG/DL (ref 65–140)
GLUCOSE SERPL-MCNC: 108 MG/DL (ref 65–140)
GLUCOSE SERPL-MCNC: 115 MG/DL (ref 65–140)
GLUCOSE SERPL-MCNC: 119 MG/DL (ref 65–140)
GLUCOSE SERPL-MCNC: 96 MG/DL (ref 65–140)
HCT VFR BLD AUTO: 24.6 % (ref 36.5–49.3)
HGB BLD-MCNC: 8 G/DL (ref 12–17)
IMM GRANULOCYTES # BLD AUTO: 0.02 THOUSAND/UL (ref 0–0.2)
IMM GRANULOCYTES NFR BLD AUTO: 0 % (ref 0–2)
LYMPHOCYTES # BLD AUTO: 1.37 THOUSANDS/ΜL (ref 0.6–4.47)
LYMPHOCYTES NFR BLD AUTO: 22 % (ref 14–44)
MAGNESIUM SERPL-MCNC: 2.1 MG/DL (ref 1.9–2.7)
MCH RBC QN AUTO: 30.8 PG (ref 26.8–34.3)
MCHC RBC AUTO-ENTMCNC: 32.5 G/DL (ref 31.4–37.4)
MCV RBC AUTO: 95 FL (ref 82–98)
MONOCYTES # BLD AUTO: 0.73 THOUSAND/ΜL (ref 0.17–1.22)
MONOCYTES NFR BLD AUTO: 12 % (ref 4–12)
NEUTROPHILS # BLD AUTO: 3.36 THOUSANDS/ΜL (ref 1.85–7.62)
NEUTS SEG NFR BLD AUTO: 54 % (ref 43–75)
NRBC BLD AUTO-RTO: 0 /100 WBCS
PLATELET # BLD AUTO: 235 THOUSANDS/UL (ref 149–390)
PMV BLD AUTO: 9.7 FL (ref 8.9–12.7)
POTASSIUM SERPL-SCNC: 4.3 MMOL/L (ref 3.5–5.3)
RBC # BLD AUTO: 2.6 MILLION/UL (ref 3.88–5.62)
SODIUM SERPL-SCNC: 129 MMOL/L (ref 135–147)
WBC # BLD AUTO: 6.18 THOUSAND/UL (ref 4.31–10.16)

## 2024-11-24 PROCEDURE — 82948 REAGENT STRIP/BLOOD GLUCOSE: CPT

## 2024-11-24 PROCEDURE — 80048 BASIC METABOLIC PNL TOTAL CA: CPT | Performed by: NURSE PRACTITIONER

## 2024-11-24 PROCEDURE — 94760 N-INVAS EAR/PLS OXIMETRY 1: CPT

## 2024-11-24 PROCEDURE — 99232 SBSQ HOSP IP/OBS MODERATE 35: CPT | Performed by: NURSE PRACTITIONER

## 2024-11-24 PROCEDURE — 83735 ASSAY OF MAGNESIUM: CPT | Performed by: NURSE PRACTITIONER

## 2024-11-24 PROCEDURE — 85025 COMPLETE CBC W/AUTO DIFF WBC: CPT | Performed by: NURSE PRACTITIONER

## 2024-11-24 RX ORDER — CALCIUM CARBONATE 500 MG/1
750 TABLET, CHEWABLE ORAL 2 TIMES DAILY PRN
Status: DISCONTINUED | OUTPATIENT
Start: 2024-11-24 | End: 2024-12-02 | Stop reason: HOSPADM

## 2024-11-24 RX ADMIN — GABAPENTIN 200 MG: 100 CAPSULE ORAL at 08:03

## 2024-11-24 RX ADMIN — ATORVASTATIN CALCIUM 80 MG: 80 TABLET, FILM COATED ORAL at 08:03

## 2024-11-24 RX ADMIN — Medication 2.5 MG: at 08:38

## 2024-11-24 RX ADMIN — SEVELAMER HYDROCHLORIDE 800 MG: 800 TABLET, FILM COATED ORAL at 17:07

## 2024-11-24 RX ADMIN — NIFEDIPINE 60 MG: 30 TABLET, EXTENDED RELEASE ORAL at 17:09

## 2024-11-24 RX ADMIN — GABAPENTIN 200 MG: 100 CAPSULE ORAL at 17:07

## 2024-11-24 RX ADMIN — ANTACID TABLETS 750 MG: 500 TABLET, CHEWABLE ORAL at 20:03

## 2024-11-24 RX ADMIN — HEPARIN SODIUM 5000 UNITS: 5000 INJECTION, SOLUTION INTRAVENOUS; SUBCUTANEOUS at 20:09

## 2024-11-24 RX ADMIN — ASPIRIN 81 MG CHEWABLE TABLET 81 MG: 81 TABLET CHEWABLE at 08:03

## 2024-11-24 RX ADMIN — HEPARIN SODIUM 5000 UNITS: 5000 INJECTION, SOLUTION INTRAVENOUS; SUBCUTANEOUS at 08:04

## 2024-11-24 RX ADMIN — SEVELAMER HYDROCHLORIDE 800 MG: 800 TABLET, FILM COATED ORAL at 08:03

## 2024-11-24 RX ADMIN — ALLOPURINOL 100 MG: 100 TABLET ORAL at 08:03

## 2024-11-24 RX ADMIN — OXYCODONE HYDROCHLORIDE 5 MG: 5 TABLET ORAL at 19:28

## 2024-11-24 RX ADMIN — SEVELAMER HYDROCHLORIDE 800 MG: 800 TABLET, FILM COATED ORAL at 11:46

## 2024-11-24 NOTE — ASSESSMENT & PLAN NOTE
Wt Readings from Last 3 Encounters:   11/19/24 106 kg (234 lb)   11/07/24 106 kg (233 lb 14.5 oz)   10/22/24 97.7 kg (215 lb 6.2 oz)     Chest x-ray showing evidence of pulmonary edema with bilateral pleural effusions secondary to volume overload from acute on chronic diastolic congestive heart failure brought on by his missed dialysis session this weekend.  Fluid management with hemodialysis per nephrology.  Will continue to monitor.  Plan as above

## 2024-11-24 NOTE — PLAN OF CARE
Problem: RESPIRATORY - ADULT  Goal: Achieves optimal ventilation and oxygenation  Description: INTERVENTIONS:  - Assess for changes in respiratory status  - Assess for changes in mentation and behavior  - Position to facilitate oxygenation and minimize respiratory effort  - Oxygen administered by appropriate delivery if ordered  - Initiate smoking cessation education as indicated  - Encourage broncho-pulmonary hygiene including cough, deep breathe, Incentive Spirometry  - Assess the need for suctioning and aspirate as needed  - Assess and instruct to report SOB or any respiratory difficulty  - Respiratory Therapy support as indicated  Outcome: Progressing     Problem: RESPIRATORY - ADULT  Goal: Achieves optimal ventilation and oxygenation  Description: INTERVENTIONS:  - Assess for changes in respiratory status  - Assess for changes in mentation and behavior  - Position to facilitate oxygenation and minimize respiratory effort  - Oxygen administered by appropriate delivery if ordered  - Initiate smoking cessation education as indicated  - Encourage broncho-pulmonary hygiene including cough, deep breathe, Incentive Spirometry  - Assess the need for suctioning and aspirate as needed  - Assess and instruct to report SOB or any respiratory difficulty  - Respiratory Therapy support as indicated  Outcome: Progressing     Problem: GENITOURINARY - ADULT  Goal: Maintains or returns to baseline urinary function  Description: INTERVENTIONS:  - Assess urinary function  - Encourage oral fluids to ensure adequate hydration if ordered  - Administer IV fluids as ordered to ensure adequate hydration  - Administer ordered medications as needed  - Offer frequent toileting  - Follow urinary retention protocol if ordered  Outcome: Progressing     Problem: METABOLIC, FLUID AND ELECTROLYTES - ADULT  Goal: Electrolytes maintained within normal limits  Description: INTERVENTIONS:  - Monitor labs and assess patient for signs and symptoms of  electrolyte imbalances  - Administer electrolyte replacement as ordered  - Monitor response to electrolyte replacements, including repeat lab results as appropriate  - Instruct patient on fluid and nutrition as appropriate  Outcome: Progressing

## 2024-11-24 NOTE — ASSESSMENT & PLAN NOTE
Chest x-ray showing evidence of pulmonary edema with bilateral pleural effusion secondary to volume overload from acute on chronic diastolic congestive heart failure brought on by the missed dialysis session this weekend.  Fluid management with hemodialysis per nephrology.  Will continue to closely monitor.  Plan as above

## 2024-11-24 NOTE — ASSESSMENT & PLAN NOTE
Lab Results   Component Value Date    EGFR 11 11/24/2024    EGFR 8 11/23/2024    EGFR 11 11/22/2024    CREATININE 4.75 (H) 11/24/2024    CREATININE 6.61 (H) 11/23/2024    CREATININE 5.04 (H) 11/22/2024

## 2024-11-24 NOTE — ASSESSMENT & PLAN NOTE
Secondary to volume overload related to end-stage renal disease. Sodium 129 today.   Cardiac Monitor/Defib/ACLS/Rescue Kit/O2/BVM

## 2024-11-24 NOTE — PROGRESS NOTES
Progress Note - Hospitalist   Name: Naresh Carpio 64 y.o. male I MRN: [id number]  Unit/Bed#: [address] [place] [address] Date of Admission: 11/19/2024   Date of Service: 11/24/2024 I Hospital Day: 5    Assessment & Plan  Acute hypoxic respiratory failure (HCC)  Patient was seen and examined in the emergency department and will be admitted to the hospital for further evaluation and management.  Patient presenting with shortness of breath found to have evidence of acute hypoxic respiratory failure. Chest x-ray showing evidence of pulmonary edema with bilateral pleural effusion secondary to volume overload from acute on chronic diastolic congestive heart failure brought on by the missed dialysis session this weekend.  Fluid management with hemodialysis per nephrology.  Continue supplemental oxygen therapy as needed and wean as tolerated.    11/20 - Patient reports feeling significantly improved today.  Extra session of ultrafiltration ordered for today.  Follow-up with further recommendations from nephrology.    11/21 - Patient still with evidence of volume overload.  Discussed with nephrology with plans for ultrafiltration with hemodialysis today.    11/22 - Improving. Plan for dialysis again tomorrow. Plan for discharge home tomorrow after dialysis.    11/23 - Continue on oxygen and with shortness of breath, dialysis in process.  Not stable for discharge     11/24-Patient feeling better, continues to be weaned off of supplemental oxygen.  Plan is for dialysis tomorrow with discharge following dialysis.  Will need home O2 evaluation  Anemia due to chronic kidney disease, on chronic dialysis (HCC)  Lab Results   Component Value Date    EGFR 11 11/24/2024    EGFR 8 11/23/2024    EGFR 11 11/22/2024    CREATININE 4.75 (H) 11/24/2024    CREATININE 6.61 (H) 11/23/2024    CREATININE 5.04 (H) 11/22/2024     Type II diabetes mellitus (HCC)  Lab Results   Component Value Date    HGBA1C 5.5 10/08/2024       Recent Labs     11/23/24  1611  11/23/24 2052 11/24/24  0717 11/24/24  1113   POCGLU 146* 127 102 115       Blood Sugar Average: Last 72 hrs:  (P) 115.4300222476716461  Patient will be placed on an insulin sliding scale.  Will continue to monitor blood sugar levels and make further adjustments as needed.  Hypertension  Continue home medications.  Will continue to monitor and make further adjustments as needed.  Chronic kidney disease-mineral and bone disorder  Lab Results   Component Value Date    EGFR 11 11/24/2024    EGFR 8 11/23/2024    EGFR 11 11/22/2024    CREATININE 4.75 (H) 11/24/2024    CREATININE 6.61 (H) 11/23/2024    CREATININE 5.04 (H) 11/22/2024     ESRD (end stage renal disease) (HCC)  Lab Results   Component Value Date    EGFR 11 11/24/2024    EGFR 8 11/23/2024    EGFR 11 11/22/2024    CREATININE 4.75 (H) 11/24/2024    CREATININE 6.61 (H) 11/23/2024    CREATININE 5.04 (H) 11/22/2024   As noted above, patient with a missed dialysis session this weekend.  Continue hemodialysis support per recommendations from nephrology.  Paroxysmal atrial fibrillation (HCC)  Continue home medications.  Will continue to monitor.  Acute on chronic diastolic (congestive) heart failure (HCC)  Wt Readings from Last 3 Encounters:   11/19/24 106 kg (234 lb)   11/07/24 106 kg (233 lb 14.5 oz)   10/22/24 97.7 kg (215 lb 6.2 oz)     Chest x-ray showing evidence of pulmonary edema with bilateral pleural effusions secondary to volume overload from acute on chronic diastolic congestive heart failure brought on by his missed dialysis session this weekend.  Fluid management with hemodialysis per nephrology.  Will continue to monitor.  Plan as above  Pulmonary edema  Chest x-ray showing evidence of pulmonary edema with bilateral pleural effusion secondary to volume overload from acute on chronic diastolic congestive heart failure brought on by the missed dialysis session this weekend.  Fluid management with hemodialysis per nephrology.  Will continue to closely  monitor.  Plan as above  Pleural effusion  Chest x-ray showing evidence of pulmonary edema with bilateral pleural effusion secondary to volume overload from acute on chronic diastolic congestive heart failure brought on by the missed dialysis session this weekend.  Fluid management with hemodialysis per nephrology.  Will continue to closely monitor.  Plan as above  Elevated troponin  Patient with elevated troponin levels likely secondary to demand ischemia from acute on chronic diastolic congestive heart failure from end-stage renal disease after a missed dialysis session.  Fluid management with hemodialysis per nephrology.  Will continue to monitor on telemetry.  Chronic wound  Patient with evidence of bilateral venous stasis ulcerations and history of right partial first ray and left fifth digit amputations.  Patient was evaluated by podiatry with plans for local wound care.    Arterial doppler revealed new bilateral popliteal stenosis and increase in stenosis of the right tibio-peroneal trunk  Vascular surgery consult  Hyponatremia  Secondary to volume overload related to end-stage renal disease. Sodium 129 today.    VTE Pharmacologic Prophylaxis:   Moderate Risk (Score 3-4) - Pharmacological DVT Prophylaxis Ordered: heparin.    Mobility:   Basic Mobility Inpatient Raw Score: 24  JH-HLM Goal: 8: Walk 250 feet or more  JH-HLM Achieved: 4: Move to chair/commode  JH-HLM Goal achieved. Continue to encourage appropriate mobility.    Patient Centered Rounds: I performed bedside rounds with nursing staff today.   Discussions with Specialists or Other Care Team Provider:     Education and Discussions with Family / Patient: Patient declined call to .     Current Length of Stay: 5 day(s)  Current Patient Status: Inpatient   Certification Statement: The patient will continue to require additional inpatient hospital stay due to dialysis, volume management   Discharge Plan: Anticipate discharge tomorrow to  home.    Code Status: Level 1 - Full Code    Subjective   Patient seen sitting up in bed, reports feeling better from yesterday.  He felt briefly short of breath overnight but continues to improve with dialysis.    Objective :  Temp:  [97.5 °F (36.4 °C)-97.8 °F (36.6 °C)] 97.5 °F (36.4 °C)  HR:  [61-74] 74  BP: (127-139)/(55-62) 128/57  Resp:  [18-19] 18  SpO2:  [86 %-99 %] 86 %  O2 Device: None (Room air)  Nasal Cannula O2 Flow Rate (L/min):  [3 L/min] 3 L/min    Body mass index is 31.74 kg/m².     Input and Output Summary (last 24 hours):     Intake/Output Summary (Last 24 hours) at 11/24/2024 1212  Last data filed at 11/24/2024 0801  Gross per 24 hour   Intake 536 ml   Output 250 ml   Net 286 ml       Physical Exam  Vitals and nursing note reviewed.   Constitutional:       Appearance: Normal appearance.   HENT:      Head: Normocephalic.      Nose: Nose normal.   Cardiovascular:      Rate and Rhythm: Normal rate and regular rhythm.   Pulmonary:      Effort: Pulmonary effort is normal.   Abdominal:      General: Abdomen is flat.      Palpations: Abdomen is soft.   Musculoskeletal:         General: Swelling present.   Skin:     General: Skin is warm and dry.   Neurological:      General: No focal deficit present.      Mental Status: He is alert.           Lines/Drains:  Lines/Drains/Airways       Active Status       Name Placement date Placement time Site Days    HD Permanent Double Catheter --  --  Internal jugular  --                            Lab Results: I have reviewed the following results:   Results from last 7 days   Lab Units 11/24/24  0502   WBC Thousand/uL 6.18   HEMOGLOBIN g/dL 8.0*   HEMATOCRIT % 24.6*   PLATELETS Thousands/uL 235   SEGS PCT % 54   LYMPHO PCT % 22   MONO PCT % 12   EOS PCT % 11*     Results from last 7 days   Lab Units 11/24/24  0502 11/23/24  0454   SODIUM mmol/L 129* 131*   POTASSIUM mmol/L 4.3 4.7   CHLORIDE mmol/L 93* 94*   CO2 mmol/L 27 27   BUN mg/dL 32* 50*   CREATININE mg/dL  4.75* 6.61*   ANION GAP mmol/L 9 10   CALCIUM mg/dL 8.0* 8.0*   ALBUMIN g/dL  --  3.7   TOTAL BILIRUBIN mg/dL  --  0.32   ALK PHOS U/L  --  95   ALT U/L  --  12   AST U/L  --  12*   GLUCOSE RANDOM mg/dL 96 95         Results from last 7 days   Lab Units 11/24/24  1113 11/24/24  0717 11/23/24  2052 11/23/24  1611 11/23/24  1129 11/23/24  0711 11/22/24  2035 11/22/24  1600 11/22/24  1124 11/22/24  0742 11/21/24  2104 11/21/24  1634   POC GLUCOSE mg/dl 115 102 127 146* 98 92 139 136 134 89 128 111               Recent Cultures (last 7 days):               Last 24 Hours Medication List:     Current Facility-Administered Medications:     acetaminophen (TYLENOL) tablet 650 mg, Q6H PRN    allopurinol (ZYLOPRIM) tablet 100 mg, Daily    aspirin chewable tablet 81 mg, Daily    atorvastatin (LIPITOR) tablet 80 mg, Daily    benzonatate (TESSALON PERLES) capsule 100 mg, TID PRN    gabapentin (NEURONTIN) capsule 200 mg, BID    heparin (porcine) subcutaneous injection 5,000 Units, Q12H KEMAL    hydrOXYzine HCL (ATARAX) tablet 25 mg, Q6H PRN    insulin lispro (HumALOG/ADMELOG) 100 units/mL subcutaneous injection 1-6 Units, TID AC **AND** Fingerstick Glucose (POCT), TID AC    insulin lispro (HumALOG/ADMELOG) 100 units/mL subcutaneous injection 1-6 Units, HS    melatonin tablet 3 mg, HS PRN    NIFEdipine (PROCARDIA XL) 24 hr tablet 60 mg, After Dinner    oxyCODONE (ROXICODONE) IR tablet 5 mg, Q8H PRN    oxyCODONE (ROXICODONE) split tablet 2.5 mg, Q8H PRN    polyethylene glycol (MIRALAX) packet 17 g, Daily PRN    sevelamer (RENAGEL) tablet 800 mg, TID With Meals    Administrative Statements   Today, Patient Was Seen By: GRANT Odom      **Please Note: This note may have been constructed using a voice recognition system.**

## 2024-11-24 NOTE — ASSESSMENT & PLAN NOTE
Lab Results   Component Value Date    HGBA1C 5.5 10/08/2024       Recent Labs     11/23/24  1611 11/23/24 2052 11/24/24  0717 11/24/24  1113   POCGLU 146* 127 102 115       Blood Sugar Average: Last 72 hrs:  (P) 115.7352792768878411  Patient will be placed on an insulin sliding scale.  Will continue to monitor blood sugar levels and make further adjustments as needed.

## 2024-11-24 NOTE — PLAN OF CARE
Problem: RESPIRATORY - ADULT  Goal: Achieves optimal ventilation and oxygenation  Description: INTERVENTIONS:  - Assess for changes in respiratory status  - Assess for changes in mentation and behavior  - Position to facilitate oxygenation and minimize respiratory effort  - Oxygen administered by appropriate delivery if ordered  - Initiate smoking cessation education as indicated  - Encourage broncho-pulmonary hygiene including cough, deep breathe, Incentive Spirometry  - Assess the need for suctioning and aspirate as needed  - Assess and instruct to report SOB or any respiratory difficulty  - Respiratory Therapy support as indicated  Outcome: Progressing     Problem: GENITOURINARY - ADULT  Goal: Maintains or returns to baseline urinary function  Description: INTERVENTIONS:  - Assess urinary function  - Encourage oral fluids to ensure adequate hydration if ordered  - Administer IV fluids as ordered to ensure adequate hydration  - Administer ordered medications as needed  - Offer frequent toileting  - Follow urinary retention protocol if ordered  Outcome: Progressing  Goal: Absence of urinary retention  Description: INTERVENTIONS:  - Assess patient’s ability to void and empty bladder  - Monitor I/O  - Bladder scan as needed  - Discuss with physician/AP medications to alleviate retention as needed  - Discuss catheterization for long term situations as appropriate  Outcome: Progressing     Problem: METABOLIC, FLUID AND ELECTROLYTES - ADULT  Goal: Electrolytes maintained within normal limits  Description: INTERVENTIONS:  - Monitor labs and assess patient for signs and symptoms of electrolyte imbalances  - Administer electrolyte replacement as ordered  - Monitor response to electrolyte replacements, including repeat lab results as appropriate  - Instruct patient on fluid and nutrition as appropriate  Outcome: Progressing  Goal: Fluid balance maintained  Description: INTERVENTIONS:  - Monitor labs   - Monitor I/O and  WT  - Instruct patient on fluid and nutrition as appropriate  - Assess for signs & symptoms of volume excess or deficit  Outcome: Progressing     Problem: Prexisting or High Potential for Compromised Skin Integrity  Goal: Skin integrity is maintained or improved  Description: INTERVENTIONS:  - Identify patients at risk for skin breakdown  - Assess and monitor skin integrity  - Assess and monitor nutrition and hydration status  - Monitor labs   - Assess for incontinence   - Turn and reposition patient  - Assist with mobility/ambulation  - Relieve pressure over bony prominences  - Avoid friction and shearing  - Provide appropriate hygiene as needed including keeping skin clean and dry  - Evaluate need for skin moisturizer/barrier cream  - Collaborate with interdisciplinary team   - Patient/family teaching  - Consider wound care consult   Outcome: Progressing

## 2024-11-24 NOTE — ASSESSMENT & PLAN NOTE
Lab Results   Component Value Date    EGFR 11 11/24/2024    EGFR 8 11/23/2024    EGFR 11 11/22/2024    CREATININE 4.75 (H) 11/24/2024    CREATININE 6.61 (H) 11/23/2024    CREATININE 5.04 (H) 11/22/2024   As noted above, patient with a missed dialysis session this weekend.  Continue hemodialysis support per recommendations from nephrology.

## 2024-11-24 NOTE — ASSESSMENT & PLAN NOTE
Patient with evidence of bilateral venous stasis ulcerations and history of right partial first ray and left fifth digit amputations.  Patient was evaluated by podiatry with plans for local wound care.    Arterial doppler revealed new bilateral popliteal stenosis and increase in stenosis of the right tibio-peroneal trunk  Vascular surgery consult

## 2024-11-24 NOTE — ASSESSMENT & PLAN NOTE
Patient was seen and examined in the emergency department and will be admitted to the hospital for further evaluation and management.  Patient presenting with shortness of breath found to have evidence of acute hypoxic respiratory failure. Chest x-ray showing evidence of pulmonary edema with bilateral pleural effusion secondary to volume overload from acute on chronic diastolic congestive heart failure brought on by the missed dialysis session this weekend.  Fluid management with hemodialysis per nephrology.  Continue supplemental oxygen therapy as needed and wean as tolerated.    11/20 - Patient reports feeling significantly improved today.  Extra session of ultrafiltration ordered for today.  Follow-up with further recommendations from nephrology.    11/21 - Patient still with evidence of volume overload.  Discussed with nephrology with plans for ultrafiltration with hemodialysis today.    11/22 - Improving. Plan for dialysis again tomorrow. Plan for discharge home tomorrow after dialysis.    11/23 - Continue on oxygen and with shortness of breath, dialysis in process.  Not stable for discharge     11/24-Patient feeling better, continues to be weaned off of supplemental oxygen.  Plan is for dialysis tomorrow with discharge following dialysis.  Will need home O2 evaluation

## 2024-11-24 NOTE — QUICK NOTE
Patient seen at bedside briefly.  He had hemodialysis yesterday with 3.9 L UF.  He feels better and shortness of breath is improved.  He is typically on a TTS schedule but due to Thanksgiving holiday schedule changes, we will do dialysis on Monday/Wednesday and Saturday next week.  Next nephrology follow-up will be tomorrow.  Please call with questions in interim.

## 2024-11-25 ENCOUNTER — APPOINTMENT (INPATIENT)
Dept: DIALYSIS | Facility: HOSPITAL | Age: 64
DRG: 291 | End: 2024-11-25
Payer: MEDICARE

## 2024-11-25 LAB
ANION GAP SERPL CALCULATED.3IONS-SCNC: 10 MMOL/L (ref 4–13)
BASOPHILS # BLD AUTO: 0.05 THOUSANDS/ΜL (ref 0–0.1)
BASOPHILS NFR BLD AUTO: 1 % (ref 0–1)
BUN SERPL-MCNC: 48 MG/DL (ref 5–25)
CALCIUM SERPL-MCNC: 8.7 MG/DL (ref 8.4–10.2)
CHLORIDE SERPL-SCNC: 91 MMOL/L (ref 96–108)
CO2 SERPL-SCNC: 28 MMOL/L (ref 21–32)
CREAT SERPL-MCNC: 6.47 MG/DL (ref 0.6–1.3)
EOSINOPHIL # BLD AUTO: 0.66 THOUSAND/ΜL (ref 0–0.61)
EOSINOPHIL NFR BLD AUTO: 10 % (ref 0–6)
ERYTHROCYTE [DISTWIDTH] IN BLOOD BY AUTOMATED COUNT: 14.4 % (ref 11.6–15.1)
GFR SERPL CREATININE-BSD FRML MDRD: 8 ML/MIN/1.73SQ M
GLUCOSE SERPL-MCNC: 100 MG/DL (ref 65–140)
GLUCOSE SERPL-MCNC: 110 MG/DL (ref 65–140)
GLUCOSE SERPL-MCNC: 123 MG/DL (ref 65–140)
GLUCOSE SERPL-MCNC: 131 MG/DL (ref 65–140)
GLUCOSE SERPL-MCNC: 99 MG/DL (ref 65–140)
HCT VFR BLD AUTO: 25.8 % (ref 36.5–49.3)
HGB BLD-MCNC: 8.3 G/DL (ref 12–17)
IMM GRANULOCYTES # BLD AUTO: 0.02 THOUSAND/UL (ref 0–0.2)
IMM GRANULOCYTES NFR BLD AUTO: 0 % (ref 0–2)
LYMPHOCYTES # BLD AUTO: 1.61 THOUSANDS/ΜL (ref 0.6–4.47)
LYMPHOCYTES NFR BLD AUTO: 24 % (ref 14–44)
MCH RBC QN AUTO: 30.3 PG (ref 26.8–34.3)
MCHC RBC AUTO-ENTMCNC: 32.2 G/DL (ref 31.4–37.4)
MCV RBC AUTO: 94 FL (ref 82–98)
MONOCYTES # BLD AUTO: 0.64 THOUSAND/ΜL (ref 0.17–1.22)
MONOCYTES NFR BLD AUTO: 10 % (ref 4–12)
NEUTROPHILS # BLD AUTO: 3.64 THOUSANDS/ΜL (ref 1.85–7.62)
NEUTS SEG NFR BLD AUTO: 55 % (ref 43–75)
NRBC BLD AUTO-RTO: 0 /100 WBCS
PLATELET # BLD AUTO: 247 THOUSANDS/UL (ref 149–390)
PMV BLD AUTO: 9.6 FL (ref 8.9–12.7)
POTASSIUM SERPL-SCNC: 4.7 MMOL/L (ref 3.5–5.3)
RBC # BLD AUTO: 2.74 MILLION/UL (ref 3.88–5.62)
SODIUM SERPL-SCNC: 129 MMOL/L (ref 135–147)
WBC # BLD AUTO: 6.62 THOUSAND/UL (ref 4.31–10.16)

## 2024-11-25 PROCEDURE — 82948 REAGENT STRIP/BLOOD GLUCOSE: CPT

## 2024-11-25 PROCEDURE — 99232 SBSQ HOSP IP/OBS MODERATE 35: CPT | Performed by: STUDENT IN AN ORGANIZED HEALTH CARE EDUCATION/TRAINING PROGRAM

## 2024-11-25 PROCEDURE — 80048 BASIC METABOLIC PNL TOTAL CA: CPT | Performed by: NURSE PRACTITIONER

## 2024-11-25 PROCEDURE — 99232 SBSQ HOSP IP/OBS MODERATE 35: CPT

## 2024-11-25 PROCEDURE — 99223 1ST HOSP IP/OBS HIGH 75: CPT | Performed by: STUDENT IN AN ORGANIZED HEALTH CARE EDUCATION/TRAINING PROGRAM

## 2024-11-25 PROCEDURE — 85025 COMPLETE CBC W/AUTO DIFF WBC: CPT | Performed by: NURSE PRACTITIONER

## 2024-11-25 RX ADMIN — SEVELAMER HYDROCHLORIDE 800 MG: 800 TABLET, FILM COATED ORAL at 12:00

## 2024-11-25 RX ADMIN — Medication 2.5 MG: at 15:20

## 2024-11-25 RX ADMIN — GABAPENTIN 200 MG: 100 CAPSULE ORAL at 17:24

## 2024-11-25 RX ADMIN — Medication 2.5 MG: at 23:17

## 2024-11-25 RX ADMIN — OXYCODONE HYDROCHLORIDE 5 MG: 5 TABLET ORAL at 09:11

## 2024-11-25 RX ADMIN — EPOETIN ALFA 3000 UNITS: 3000 SOLUTION INTRAVENOUS; SUBCUTANEOUS at 12:27

## 2024-11-25 RX ADMIN — Medication 2.5 MG: at 01:32

## 2024-11-25 RX ADMIN — SEVELAMER HYDROCHLORIDE 800 MG: 800 TABLET, FILM COATED ORAL at 08:14

## 2024-11-25 RX ADMIN — ASPIRIN 81 MG CHEWABLE TABLET 81 MG: 81 TABLET CHEWABLE at 12:00

## 2024-11-25 RX ADMIN — OXYCODONE HYDROCHLORIDE 5 MG: 5 TABLET ORAL at 17:23

## 2024-11-25 RX ADMIN — HEPARIN SODIUM 5000 UNITS: 5000 INJECTION, SOLUTION INTRAVENOUS; SUBCUTANEOUS at 08:14

## 2024-11-25 RX ADMIN — HEPARIN SODIUM 5000 UNITS: 5000 INJECTION, SOLUTION INTRAVENOUS; SUBCUTANEOUS at 21:02

## 2024-11-25 RX ADMIN — GABAPENTIN 200 MG: 100 CAPSULE ORAL at 08:14

## 2024-11-25 RX ADMIN — SEVELAMER HYDROCHLORIDE 800 MG: 800 TABLET, FILM COATED ORAL at 16:49

## 2024-11-25 RX ADMIN — NIFEDIPINE 60 MG: 30 TABLET, EXTENDED RELEASE ORAL at 17:23

## 2024-11-25 RX ADMIN — ATORVASTATIN CALCIUM 80 MG: 80 TABLET, FILM COATED ORAL at 12:00

## 2024-11-25 RX ADMIN — ALLOPURINOL 100 MG: 100 TABLET ORAL at 12:00

## 2024-11-25 NOTE — ASSESSMENT & PLAN NOTE
Lab Results   Component Value Date    HGBA1C 5.5 10/08/2024       Recent Labs     11/24/24  1113 11/24/24  1555 11/24/24  2109 11/25/24  0729   POCGLU 115 108 119 99       Blood Sugar Average: Last 72 hrs:  (P) [id number]

## 2024-11-25 NOTE — ASSESSMENT & PLAN NOTE
Lab Results   Component Value Date    EGFR 8 11/25/2024    EGFR 11 11/24/2024    EGFR 8 11/23/2024    CREATININE 6.47 (H) 11/25/2024    CREATININE 4.75 (H) 11/24/2024    CREATININE 6.61 (H) 11/23/2024   As noted above, patient with a missed dialysis session this weekend.  Continue hemodialysis support per recommendations from nephrology.

## 2024-11-25 NOTE — ASSESSMENT & PLAN NOTE
Dialysis unit/days: [facility]  Access: PermCath  Patient is TTS  Plan for HD today on Wednesday due to holiday schedule    kg  Renal Diet  Fluid restriction 1-1.5L/d  Adjust medications to GFR<10  Avoid opioids

## 2024-11-25 NOTE — ASSESSMENT & PLAN NOTE
LEAD 11/22  Right  50-75% stenosis proximal popliteal artery. >75 stenosis proximal tibio-peroneal trunk  SALVATORE:1.7, MTP: non-compressible. GTP 78 mmHg  Left  Greater than 75% stenosis proximal popliteal artery  SALVATORE: 1.7, MTP non compressible, GTP 50 mmHg.  Continue ASA and statin  Patient refusing intervention at this time and would like to follow up as an outpatient.   Local wound care per podiatry.

## 2024-11-25 NOTE — ASSESSMENT & PLAN NOTE
Lab Results   Component Value Date    EGFR 8 11/25/2024    EGFR 11 11/24/2024    EGFR 8 11/23/2024    CREATININE 6.47 (H) 11/25/2024    CREATININE 4.75 (H) 11/24/2024    CREATININE 6.61 (H) 11/23/2024     Start Epogen 3000 units on HD

## 2024-11-25 NOTE — CONSULTS
Consultation - Surgery-General   Name: Naresh Carpio 64 y.o. male I MRN: [id number]  Unit/Bed#: [address] [place] [address] Date of Admission: 11/19/2024   Date of Service: 11/25/2024 I Hospital Day: 6   Inpatient consult to Vascular Surgery  Consult performed by: Butch Hurst PA-C  Consult ordered by: GRANT Odom        Physician Requesting Evaluation: Milan Ruiz MD   Reason for Evaluation / Principal Problem: PAD    Assessment & Plan  PAD (peripheral artery disease) (HCC)  65 y/o m pmh of ESRD on HD, chronic anemia, HTN, TIA, right partial 1st ray resection presenting with shortness of breath. Vascular surgery consulted for PAD.     LEAD 11/22  Right  -50-75% stenosis proximal popliteal artery. >75 stenosis proximal tibio-peroneal trunk  -SALVATORE:1.7, MTP: non-compressible. GTP 78 mmHg  Left  -Greater than 75% stenosis proximal popliteal artery  -SALVATORE: 1.7, MTP non compressible, GTP 50 mmHg.     -continue ASA and statin.   -Patient refusing intervention at this time and would like to follow up as an outpatient.   -Local wound care per podiatry.   Chronic wound  Bilateral venous stasis ulceration pre-tibial area.   Acute hypoxic respiratory failure (HCC)    Anemia due to chronic kidney disease, on chronic dialysis (HCC)  Lab Results   Component Value Date    EGFR 8 11/25/2024    EGFR 11 11/24/2024    EGFR 8 11/23/2024    CREATININE 6.47 (H) 11/25/2024    CREATININE 4.75 (H) 11/24/2024    CREATININE 6.61 (H) 11/23/2024     Type II diabetes mellitus (HCC)  Lab Results   Component Value Date    HGBA1C 5.5 10/08/2024       Recent Labs     11/24/24  1113 11/24/24  1555 11/24/24  2109 11/25/24  0729   POCGLU 115 108 119 99       Blood Sugar Average: Last 72 hrs:  (P) [id number]    Hypertension    Chronic kidney disease-mineral and bone disorder  Lab Results   Component Value Date    EGFR 8 11/25/2024    EGFR 11 11/24/2024    EGFR 8 11/23/2024    CREATININE 6.47 (H) 11/25/2024    CREATININE 4.75  (H) 11/24/2024    CREATININE 6.61 (H) 11/23/2024     Hyponatremia    ESRD (end stage renal disease) (HCC)  Lab Results   Component Value Date    EGFR 8 11/25/2024    EGFR 11 11/24/2024    EGFR 8 11/23/2024    CREATININE 6.47 (H) 11/25/2024    CREATININE 4.75 (H) 11/24/2024    CREATININE 6.61 (H) 11/23/2024     Paroxysmal atrial fibrillation (HCC)    Acute on chronic diastolic (congestive) heart failure (HCC)  Wt Readings from Last 3 Encounters:   11/19/24 106 kg (234 lb)   11/07/24 106 kg (233 lb 14.5 oz)   10/22/24 97.7 kg (215 lb 6.2 oz)             Pulmonary edema    Pleural effusion    Elevated troponin        History of Present Illness   Naresh Carpio is a 64 y.o. male ESRD on HD, chronic anemia, HTN, TIA, right partial 1st ray resection presenting with shortness of breath and lower extremity edema Vascular surgery consulted for PAD. Patient reports lower extremity wound are from a fall sustained about a week ago. Patient reports wounds are improving. Patient reports he would like to focus on breathing at this time and does not wish to proceed with any interventions at this time.     Review of Systems   Constitutional:  Negative for chills, fatigue and fever.   HENT:  Negative for congestion, ear pain, hearing loss, postnasal drip, sinus pressure, sinus pain and sore throat.    Eyes:  Negative for pain and discharge.   Respiratory:  Positive for shortness of breath. Negative for chest tightness.    Cardiovascular:  Negative for chest pain.   Gastrointestinal:  Negative for abdominal pain, constipation, nausea and vomiting.   Genitourinary:  Negative for difficulty urinating.   Musculoskeletal:  Negative for arthralgias and myalgias.   Skin:  Positive for wound. Negative for rash.   Neurological:  Negative for dizziness and headaches.   Psychiatric/Behavioral:  Negative for behavioral problems.      I have reviewed the patient's PMH, PSH, Social History, Family History, Meds, and Allergies  Historical  Information   Past Medical History:   Diagnosis Date    Acute cystitis 10/18/2024    Acute on chronic anemia 01/23/2022    Atrial fibrillation (HCC)     Diabetes mellitus (HCC)     ESRD (end stage renal disease) on dialysis (HCC)     Hematuria 10/10/2024    Hematuria 10/10/2024    Hyperkalemia 04/06/2024    Hyperlipidemia     Hypertension     Left toe amputee (HCC)     TIA (transient ischemic attack)     Toxic metabolic encephalopathy 10/08/2024     Past Surgical History:   Procedure Laterality Date    AMPUTATION Left     left foot resection 10 years ago dr tran    IR LOWER EXTREMITY ANGIOGRAM  08/29/2023    IR TEMPORARY DIALYSIS CATHETER PLACEMENT  08/25/2023    IR TUNNELED CENTRAL LINE REMOVAL  08/23/2023    IR TUNNELED DIALYSIS CATHETER PLACEMENT  01/27/2022    IR TUNNELED DIALYSIS CATHETER PLACEMENT  08/30/2023    [facility] AMPUTATION METATARSAL W/TOE SINGLE Right 8/31/2023    Procedure: RIGHT PARTIAL 1ST RAY RESECTION WITH  WOUND VAC APPLICATION;  Surgeon: Won Parmar DPM;  [facility];  Service: Podiatry     Social History     Tobacco Use    Smoking status: Never     Passive exposure: Never    Smokeless tobacco: Never   Vaping Use    Vaping status: Never Used   Substance and Sexual Activity    Alcohol use: Not Currently     Alcohol/week: 0.0 standard drinks of alcohol     Comment: 0    Drug use: Never    Sexual activity: Not on file     E-Cigarette/Vaping    E-Cigarette Use Never User      E-Cigarette/Vaping Substances    Nicotine No     THC No     CBD No     Flavoring No     Other No     Unknown No      Family history non-contributory  Social History     Tobacco Use    Smoking status: Never     Passive exposure: Never    Smokeless tobacco: Never   Vaping Use    Vaping status: Never Used   Substance and Sexual Activity    Alcohol use: Not Currently     Alcohol/week: 0.0 standard drinks of alcohol     Comment: 0    Drug use: Never    Sexual activity: Not on file       Current Facility-Administered  Medications:     acetaminophen (TYLENOL) tablet 650 mg, Q6H PRN    allopurinol (ZYLOPRIM) tablet 100 mg, Daily    aspirin chewable tablet 81 mg, Daily    atorvastatin (LIPITOR) tablet 80 mg, Daily    benzonatate (TESSALON PERLES) capsule 100 mg, TID PRN    calcium carbonate (TUMS) chewable tablet 750 mg, BID PRN    [START ON 11/26/2024] epoetin jessica (EPOGEN,PROCRIT) injection 3,000 Units, Once per day on Tuesday Thursday Saturday    gabapentin (NEURONTIN) capsule 200 mg, BID    heparin (porcine) subcutaneous injection 5,000 Units, Q12H KEMAL    hydrOXYzine HCL (ATARAX) tablet 25 mg, Q6H PRN    insulin lispro (HumALOG/ADMELOG) 100 units/mL subcutaneous injection 1-6 Units, TID AC **AND** Fingerstick Glucose (POCT), TID AC    insulin lispro (HumALOG/ADMELOG) 100 units/mL subcutaneous injection 1-6 Units, HS    melatonin tablet 3 mg, HS PRN    NIFEdipine (PROCARDIA XL) 24 hr tablet 60 mg, After Dinner    oxyCODONE (ROXICODONE) IR tablet 5 mg, Q8H PRN    oxyCODONE (ROXICODONE) split tablet 2.5 mg, Q8H PRN    polyethylene glycol (MIRALAX) packet 17 g, Daily PRN    sevelamer (RENAGEL) tablet 800 mg, TID With Meals  Prior to Admission Medications   Prescriptions Last Dose Informant Patient Reported? Taking?   NIFEdipine (PROCARDIA XL) 30 mg 24 hr tablet 11/18/2024  No Yes   Sig: Take 2 tablets (60 mg total) by mouth daily after dinner   allopurinol (ZYLOPRIM) 100 mg tablet Unknown Self No No   Sig: Take 1 tablet (100 mg total) by mouth daily   aspirin 81 mg chewable tablet 11/18/2024  No Yes   Sig: Chew 1 tablet (81 mg total) daily   atorvastatin (LIPITOR) 80 mg tablet 11/18/2024  Yes Yes   Sig: Take 80 mg by mouth daily   gabapentin (NEURONTIN) 100 mg capsule 11/18/2024  No Yes   Sig: Take 2 capsules (200 mg total) by mouth 2 (two) times a day   Patient taking differently: Take 200 mg by mouth 2 (two) times a day   naloxone (NARCAN) 4 mg/0.1 mL nasal spray Unknown Self No No   Sig: Administer 1 spray into a nostril. If no  response after 2-3 minutes, give another dose in the other nostril using a new spray.   pantoprazole (PROTONIX) 40 mg tablet Not Taking  No No   Sig: Take 1 tablet (40 mg total) by mouth daily in the early morning   Patient not taking: Reported on 11/19/2024   polyethylene glycol (MIRALAX) 17 g packet Not Taking Self No No   Sig: Take 17 g by mouth daily Do not start before September 7, 2023.   Patient not taking: Reported on 11/19/2024   sevelamer (RENAGEL) 800 mg tablet Unknown Self No No   Sig: Take 1 tablet (800 mg total) by mouth 3 (three) times a day with meals   Patient not taking: Reported on 11/19/2024      Facility-Administered Medications: None     Patient has no known allergies.    Objective :  Temp:  [98 °F (36.7 °C)-98.1 °F (36.7 °C)] 98.1 °F (36.7 °C)  HR:  [59-78] 62  BP: (127-186)/(46-91) 141/64  Resp:  [17-20] 18  SpO2:  [86 %-96 %] 95 %  O2 Device: None (Room air)    Lines/Drains/Airways       Active Status       Name Placement date Placement time Site Days    HD Permanent Double Catheter --  --  Internal jugular  --                  Physical Exam  Vitals and nursing note reviewed.   HENT:      Head: Normocephalic and atraumatic.      Nose: Nose normal.      Mouth/Throat:      Mouth: Mucous membranes are moist.   Cardiovascular:      Rate and Rhythm: Normal rate.      Pulses:           Radial pulses are 2+ on the right side and 2+ on the left side.        Dorsalis pedis pulses are detected w/ Doppler on the right side and detected w/ Doppler on the left side.        Posterior tibial pulses are detected w/ Doppler on the right side and detected w/ Doppler on the left side.   Pulmonary:      Effort: Pulmonary effort is normal.   Feet:      Comments: Right great toe amputation.   Skin:     General: Skin is warm.      Comments: Lower extremity bandage c/d/I. See attached photos for review.    Neurological:      Mental Status: He is alert.   Psychiatric:         Mood and Affect: Mood normal.                                Lab Results: I have reviewed the following results:  Recent Labs     11/23/24  0454 11/24/24  0502 11/25/24  0836   WBC 6.04 6.18 6.62   HGB 8.2* 8.0* 8.3*   HCT 25.8* 24.6* 25.8*    235 247   SODIUM 131* 129* 129*   K 4.7 4.3 4.7   CL 94* 93* 91*   CO2 27 27 28   BUN 50* 32* 48*   CREATININE 6.61* 4.75* 6.47*   GLUC 95 96 100   MG  --  2.1  --    AST 12*  --   --    ALT 12  --   --    ALB 3.7  --   --    TBILI 0.32  --   --    ALKPHOS 95  --   --      Imaging reviewed  VAS ARTERIAL DUPLEX- LOWER LIMB BILATERAL   Final Result by oJey Gurrola DO (11/22 1518)      XR chest 1 view portable   ED Interpretation by Solomon Davis MD (11/19 1108)   Cardiomegaly and pulmonary edema right worse than the left      Final Result by Paco Cano MD (11/19 1132)      Pulmonary edema with bilateral pleural effusions.      Findings concur with the preliminary report by the referring clinician already in PACS and/or our electronic record EPIC.      Workstation performed: [id number]               VTE Pharmacologic Prophylaxis: Heparin  VTE Mechanical Prophylaxis: sequential compression device    Administrative Statements   I have spent a total time of 30 minutes in caring for this patient on the day of the visit/encounter including Impressions, Counseling / Coordination of care, Documenting in the medical record, Reviewing / ordering tests, medicine, procedures  , and Obtaining or reviewing history  .

## 2024-11-25 NOTE — ASSESSMENT & PLAN NOTE
Lab Results   Component Value Date    HGBA1C 5.5 10/08/2024       Recent Labs     11/24/24  1555 11/24/24  2109 11/25/24  0729 11/25/24  1142   POCGLU 108 119 99 131       Blood Sugar Average: Last 72 hrs:  (P) 116.5713485028861386  Patient will be placed on an insulin sliding scale.  Will continue to monitor blood sugar levels and make further adjustments as needed.

## 2024-11-25 NOTE — PROGRESS NOTES
Progress Note - Nephrology   Name: Naresh Carpio 64 y.o. male I MRN: [id number]  Unit/Bed#: [address] [place] [address] [address] I Date of Admission: 11/19/2024   Date of Service: 11/25/2024 I Hospital Day: 6     Assessment & Plan  ESRD (end stage renal disease) (HCC)  Dialysis unit/days: [facility]  Access: PermCath  Patient is TTS  Plan for HD today on Wednesday due to holiday schedule    kg  Renal Diet  Fluid restriction 1-1.5L/d  Adjust medications to GFR<10  Avoid opioids       Anemia due to chronic kidney disease, on chronic dialysis (HCC)    Current hemoglobin: 8 mg/dL  Etiology secondary to  ESRD  Treatment:  Start Epogen 3000 units on HD  Transfuse for hemoglobin less than 7.0 per primary service  Hypertension  Volume: Hypervolemic  Blood pressure: Hypertensive, /70  Recommend low-sodium diet UF on HD  Nifedipine 60 mg daily  Chronic kidney disease-mineral and bone disorder  Continue with sevelamer 800 mg 3 times daily with meals  Type II diabetes mellitus (HCC)  HbA1c 5.5  Advised to maintain a good DM control  Maintain healthy diet (vegetables, fruits, whole grains, nonfat or low fat)  Weight loss  Physical activity (5 to 10 minutes to start the increase to 30 min a day)    Acute on chronic diastolic (congestive) heart failure (HCC)  UF on HD   Chronic wound  Management as per primary team     I have reviewed the nephrology recommendations including HD today due to holiday schedule, with primary team, and we are in agreement with renal plan including the information outlined above. I have discussed the above management plan in detail with the primary service.     Subjective   Brief History of Admission - 63 yo man with PMH of ESRD on HD, hypertension, diabetes, chronic lower extremity wounds p/w shortness of breath.  Nephrology is consulted for management of ESRD    Patient complains of mild shortness of breath, no chest pain   Objective :  Temp:  [97.5 °F (36.4 °C)-98.1 °F (36.7 °C)] 98.1 °F (36.7 °C)  HR:  [64-78]  66  BP: (128-186)/(46-91) 162/70  Resp:  [17-20] 17  SpO2:  [86 %-95 %] 92 %  O2 Device: None (Room air)  Nasal Cannula O2 Flow Rate (L/min):  [3 L/min] 3 L/min    Current Weight: Weight - Scale: 106 kg (234 lb)  First Weight: Weight - Scale: 106 kg (234 lb)  I/O         11/23 0701 11/24 0700 11/24 0701 11/25 0700 11/25 0701  11/26 0700    P.O. 834 780     I.V. (mL/kg) 510 (4.8)      Total Intake(mL/kg) 1344 (12.7) 780 (7.4)     Urine (mL/kg/hr) 250 (0.1) 300 (0.1)     Other 4400      Total Output 4650 300     Net -3306 +480                  Physical Exam  General:  no acute distress at this time  Skin:  No acute rash  Eyes:  No scleral icterus and noninjected  ENT:  mucous membranes moist  Neck:  no carotid bruits  Chest:  Clear to auscultation percussion, good respiratory effort, no use of accessory respiratory muscles  CVS:  Regular rate and rhythm without rub   Abdomen:  soft and nontender   Extremities: Lower extremity wounds, edema   Neuro:  No gross focality  Psych:  Alert , cooperative   Dialysis access; PermCath    Medications:    Current Facility-Administered Medications:     acetaminophen (TYLENOL) tablet 650 mg, 650 mg, Oral, Q6H PRN, Milan Ruiz MD    allopurinol (ZYLOPRIM) tablet 100 mg, 100 mg, Oral, Daily, Milan Ruiz MD, 100 mg at 11/24/24 0803    aspirin chewable tablet 81 mg, 81 mg, Oral, Daily, Milan Ruiz MD, 81 mg at 11/24/24 0803    atorvastatin (LIPITOR) tablet 80 mg, 80 mg, Oral, Daily, Milan Ruiz MD, 80 mg at 11/24/24 0803    benzonatate (TESSALON PERLES) capsule 100 mg, 100 mg, Oral, TID PRN, GRANT Church    calcium carbonate (TUMS) chewable tablet 750 mg, 750 mg, Oral, BID PRN, GRANT Rich, 750 mg at 11/24/24 2003    gabapentin (NEURONTIN) capsule 200 mg, 200 mg, Oral, BID, Milan Ruiz MD, 200 mg at 11/24/24 1707    heparin (porcine) subcutaneous injection 5,000 Units, 5,000 Units, Subcutaneous, Q12H [facility], Milan Najera  MD Sara, 5,000 Units at 11/24/24 2009    hydrOXYzine HCL (ATARAX) tablet 25 mg, 25 mg, Oral, Q6H PRN, GRANT Church, 25 mg at 11/23/24 0450    insulin lispro (HumALOG/ADMELOG) 100 units/mL subcutaneous injection 1-6 Units, 1-6 Units, Subcutaneous, TID AC **AND** Fingerstick Glucose (POCT), , , TID AC, Milan Ruiz MD    insulin lispro (HumALOG/ADMELOG) 100 units/mL subcutaneous injection 1-6 Units, 1-6 Units, Subcutaneous, HS, Milan Ruiz MD    melatonin tablet 3 mg, 3 mg, Oral, HS PRN, GRANT Church, 3 mg at 11/22/24 2223    NIFEdipine (PROCARDIA XL) 24 hr tablet 60 mg, 60 mg, Oral, After Dinner, Milan Ruiz MD, 60 mg at 11/24/24 1709    oxyCODONE (ROXICODONE) IR tablet 5 mg, 5 mg, Oral, Q8H PRN, Milan Ruiz MD, 5 mg at 11/24/24 1928    oxyCODONE (ROXICODONE) split tablet 2.5 mg, 2.5 mg, Oral, Q8H PRN, Milan Ruiz MD, 2.5 mg at 11/25/24 0132    polyethylene glycol (MIRALAX) packet 17 g, 17 g, Oral, Daily PRN, Milan Ruiz MD    sevelamer (RENAGEL) tablet 800 mg, 800 mg, Oral, TID With Meals, German Linton MD, 800 mg at 11/24/24 1707      Lab Results: I have reviewed the following results:  Results from last 7 days   Lab Units 11/24/24  0502 11/23/24  0454 11/22/24  0350 11/21/24  0600 11/20/24  0847 11/19/24  1042   WBC Thousand/uL 6.18 6.04 6.80 6.32 7.61 9.52   HEMOGLOBIN g/dL 8.0* 8.2* 8.6* 7.9* 7.6* 8.2*   HEMATOCRIT % 24.6* 25.8* 27.2* 25.1* 23.8* 26.5*   PLATELETS Thousands/uL 235 245 308 242 252 270   POTASSIUM mmol/L 4.3 4.7 4.3 4.9 4.4 4.9   CHLORIDE mmol/L 93* 94* 91* 95* 94* 100   CO2 mmol/L 27 27 28 26 29 23   BUN mg/dL 32* 50* 37* 59* 48* 74*   CREATININE mg/dL 4.75* 6.61* 5.04* 7.12* 6.18* 9.08*   CALCIUM mg/dL 8.0* 8.0* 8.4 8.4 8.3* 9.1   MAGNESIUM mg/dL 2.1  --  2.0 2.1 2.0  --    PHOSPHORUS mg/dL  --   --   --  5.1*  --   --    ALBUMIN g/dL  --  3.7  --  3.6  --  3.9       Administrative Statements     Portions of the  "record may have been created with voice recognition software. Occasional wrong word or \"sound a like\" substitutions may have occurred due to the inherent limitations of voice recognition software. Read the chart carefully and recognize, using context, where substitutions have occurred.If you have any questions, please contact the dictating provider.  "

## 2024-11-25 NOTE — ASSESSMENT & PLAN NOTE
Patient with evidence of bilateral venous stasis ulcerations and history of right partial first ray and left fifth digit amputations.  Patient was evaluated by podiatry with plans for local wound care.    Arterial doppler revealed new bilateral popliteal stenosis and increase in stenosis of the right tibio-peroneal trunk  Vascular surgery consult, appreciate input  Patient declined any surgical intervention this admission

## 2024-11-25 NOTE — ASSESSMENT & PLAN NOTE
HbA1c 5.5  Advised to maintain a good DM control  Maintain healthy diet (vegetables, fruits, whole grains, nonfat or low fat)  Weight loss  Physical activity (5 to 10 minutes to start the increase to 30 min a day)

## 2024-11-25 NOTE — ASSESSMENT & PLAN NOTE
Lab Results   Component Value Date    EGFR 8 11/25/2024    EGFR 11 11/24/2024    EGFR 8 11/23/2024    CREATININE 6.47 (H) 11/25/2024    CREATININE 4.75 (H) 11/24/2024    CREATININE 6.61 (H) 11/23/2024   Dialysis days: Tuesday, Thursday, Saturday

## 2024-11-25 NOTE — ASSESSMENT & PLAN NOTE
Current hemoglobin: 8 mg/dL  Etiology secondary to  ESRD  Treatment:  Start Epogen 3000 units on HD  Transfuse for hemoglobin less than 7.0 per primary service

## 2024-11-25 NOTE — ASSESSMENT & PLAN NOTE
Patient was seen and examined in the emergency department and will be admitted to the hospital for further evaluation and management.  Patient presenting with shortness of breath found to have evidence of acute hypoxic respiratory failure. Chest x-ray showing evidence of pulmonary edema with bilateral pleural effusion secondary to volume overload from acute on chronic diastolic congestive heart failure brought on by the missed dialysis session this weekend.  Fluid management with hemodialysis per nephrology.  Continue supplemental oxygen therapy as needed and wean as tolerated.    11/20 - Patient reports feeling significantly improved today.  Extra session of ultrafiltration ordered for today.  Follow-up with further recommendations from nephrology.    11/21 - Patient still with evidence of volume overload.  Discussed with nephrology with plans for ultrafiltration with hemodialysis today.    11/22 - Improving. Plan for dialysis again tomorrow. Plan for discharge home tomorrow after dialysis.    11/23 - Continue on oxygen and with shortness of breath, dialysis in process.  Not stable for discharge     11/24-Patient feeling better, continues to be weaned off of supplemental oxygen.  Plan is for dialysis tomorrow with discharge following dialysis.  Will need home O2 evaluation    11/25: Dialyzed today.  Home O2 evaluation

## 2024-11-25 NOTE — ASSESSMENT & PLAN NOTE
Volume: Hypervolemic  Blood pressure: Hypertensive, /70  Recommend low-sodium diet UF on HD  Nifedipine 60 mg daily

## 2024-11-25 NOTE — PROGRESS NOTES
Progress Note - Hospitalist   Name: Naresh Carpio 64 y.o. male I MRN: [id number]  Unit/Bed#: [address] [place] [address] I Date of Admission: 11/19/2024   Date of Service: 11/25/2024 I Hospital Day: 6    Assessment & Plan  Acute hypoxic respiratory failure (HCC)  Patient was seen and examined in the emergency department and will be admitted to the hospital for further evaluation and management.  Patient presenting with shortness of breath found to have evidence of acute hypoxic respiratory failure. Chest x-ray showing evidence of pulmonary edema with bilateral pleural effusion secondary to volume overload from acute on chronic diastolic congestive heart failure brought on by the missed dialysis session this weekend.  Fluid management with hemodialysis per nephrology.  Continue supplemental oxygen therapy as needed and wean as tolerated.    11/20 - Patient reports feeling significantly improved today.  Extra session of ultrafiltration ordered for today.  Follow-up with further recommendations from nephrology.    11/21 - Patient still with evidence of volume overload.  Discussed with nephrology with plans for ultrafiltration with hemodialysis today.    11/22 - Improving. Plan for dialysis again tomorrow. Plan for discharge home tomorrow after dialysis.    11/23 - Continue on oxygen and with shortness of breath, dialysis in process.  Not stable for discharge     11/24-Patient feeling better, continues to be weaned off of supplemental oxygen.  Plan is for dialysis tomorrow with discharge following dialysis.  Will need home O2 evaluation    11/25: Dialyzed today.  Home O2 evaluation  Anemia due to chronic kidney disease, on chronic dialysis (HCC)  Lab Results   Component Value Date    EGFR 8 11/25/2024    EGFR 11 11/24/2024    EGFR 8 11/23/2024    CREATININE 6.47 (H) 11/25/2024    CREATININE 4.75 (H) 11/24/2024    CREATININE 6.61 (H) 11/23/2024     Start Epogen 3000 units on HD  Type II diabetes mellitus (HCC)  Lab Results    Component Value Date    HGBA1C 5.5 10/08/2024       Recent Labs     11/24/24  1555 11/24/24  2109 11/25/24  0729 11/25/24  1142   POCGLU 108 119 99 131       Blood Sugar Average: Last 72 hrs:  (P) [id number]  Patient will be placed on an insulin sliding scale.  Will continue to monitor blood sugar levels and make further adjustments as needed.  Hypertension  Continue home medications.  Will continue to monitor and make further adjustments as needed.  Chronic kidney disease-mineral and bone disorder  Lab Results   Component Value Date    EGFR 8 11/25/2024    EGFR 11 11/24/2024    EGFR 8 11/23/2024    CREATININE 6.47 (H) 11/25/2024    CREATININE 4.75 (H) 11/24/2024    CREATININE 6.61 (H) 11/23/2024   Dialysis days: Tuesday, Thursday, Saturday  ESRD (end stage renal disease) (HCC)  Lab Results   Component Value Date    EGFR 8 11/25/2024    EGFR 11 11/24/2024    EGFR 8 11/23/2024    CREATININE 6.47 (H) 11/25/2024    CREATININE 4.75 (H) 11/24/2024    CREATININE 6.61 (H) 11/23/2024   As noted above, patient with a missed dialysis session this weekend.  Continue hemodialysis support per recommendations from nephrology.  Paroxysmal atrial fibrillation (HCC)  Continue home medications.  Will continue to monitor.  Acute on chronic diastolic (congestive) heart failure (HCC)  Wt Readings from Last 3 Encounters:   11/19/24 106 kg (234 lb)   11/07/24 106 kg (233 lb 14.5 oz)   10/22/24 97.7 kg (215 lb 6.2 oz)     Chest x-ray showing evidence of pulmonary edema with bilateral pleural effusions secondary to volume overload from acute on chronic diastolic congestive heart failure brought on by his missed dialysis session this weekend.  Fluid management with hemodialysis per nephrology.  Will continue to monitor.  Plan as above  Pulmonary edema  Chest x-ray showing evidence of pulmonary edema with bilateral pleural effusion secondary to volume overload from acute on chronic diastolic congestive heart failure brought on by  the missed dialysis session this weekend.  Fluid management with hemodialysis per nephrology.  Will continue to closely monitor.  Plan as above  Pleural effusion  Chest x-ray showing evidence of pulmonary edema with bilateral pleural effusion secondary to volume overload from acute on chronic diastolic congestive heart failure brought on by the missed dialysis session this weekend.  Fluid management with hemodialysis per nephrology.  Will continue to closely monitor.  Plan as above  Elevated troponin  Patient with elevated troponin levels likely secondary to demand ischemia from acute on chronic diastolic congestive heart failure from end-stage renal disease after a missed dialysis session.  Fluid management with hemodialysis per nephrology.  Will continue to monitor on telemetry.  Chronic wound  Patient with evidence of bilateral venous stasis ulcerations and history of right partial first ray and left fifth digit amputations.  Patient was evaluated by podiatry with plans for local wound care.    Arterial doppler revealed new bilateral popliteal stenosis and increase in stenosis of the right tibio-peroneal trunk  Vascular surgery consult, appreciate input  Patient declined any surgical intervention this admission  Hyponatremia  Secondary to volume overload related to end-stage renal disease. Sodium 129 today.  PAD (peripheral artery disease) (HCC)  LEAD 11/22  Right  50-75% stenosis proximal popliteal artery. >75 stenosis proximal tibio-peroneal trunk  SALVATORE:1.7, MTP: non-compressible. GTP 78 mmHg  Left  Greater than 75% stenosis proximal popliteal artery  SALVATORE: 1.7, MTP non compressible, GTP 50 mmHg.  Continue ASA and statin  Patient refusing intervention at this time and would like to follow up as an outpatient.   Local wound care per podiatry.          VTE Pharmacologic Prophylaxis:   Moderate Risk (Score 3-4) - Pharmacological DVT Prophylaxis Ordered: heparin.    Mobility:   Basic Mobility Inpatient Raw Score:  24  JH-HLM Goal: 8: Walk 250 feet or more  JH-HLM Achieved: 7: Walk 25 feet or more  JH-HLM Goal achieved. Continue to encourage appropriate mobility.    Patient Centered Rounds: I performed bedside rounds with nursing staff today.   Discussions with Specialists or Other Care Team Provider: Yes    Education and Discussions with Family / Patient: Patient declined call to .     Current Length of Stay: 6 day(s)  Current Patient Status: Inpatient   Certification Statement: The patient will continue to require additional inpatient hospital stay due to oxygen requirement  Discharge Plan: Anticipate discharge tomorrow to home.    Code Status: Level 1 - Full Code    Subjective   Patient seen and examined sitting up in bed.  Reports feeling better post dialysis today.    Objective :  Temp:  [96.8 °F (36 °C)-98.1 °F (36.7 °C)] 97.8 °F (36.6 °C)  HR:  [59-78] 68  BP: (127-186)/(46-95) 144/85  Resp:  [16-20] 18  SpO2:  [91 %-96 %] 94 %  O2 Device: Mid flow nasal cannula  Nasal Cannula O2 Flow Rate (L/min):  [2 L/min] 2 L/min    Body mass index is 31.74 kg/m².     Input and Output Summary (last 24 hours):     Intake/Output Summary (Last 24 hours) at 11/25/2024 1555  Last data filed at 11/25/2024 1339  Gross per 24 hour   Intake 1180 ml   Output 3800 ml   Net -2620 ml       Physical Exam  Vitals and nursing note reviewed.   Constitutional:       General: He is not in acute distress.     Appearance: He is well-developed.   HENT:      Head: Normocephalic and atraumatic.   Eyes:      Conjunctiva/sclera: Conjunctivae normal.   Cardiovascular:      Rate and Rhythm: Normal rate and regular rhythm.      Heart sounds: No murmur heard.  Pulmonary:      Effort: Pulmonary effort is normal. No respiratory distress.      Breath sounds: Normal breath sounds.   Abdominal:      Palpations: Abdomen is soft.      Tenderness: There is no abdominal tenderness.   Musculoskeletal:         General: No swelling.      Cervical back: Neck  supple.      Comments: Bilateral lower extremity ACE wrapped   Skin:     General: Skin is warm and dry.      Capillary Refill: Capillary refill takes less than 2 seconds.   Neurological:      Mental Status: He is alert.   Psychiatric:         Mood and Affect: Mood normal.           Lines/Drains:  Lines/Drains/Airways       Active Status       Name Placement date Placement time Site Days    HD Permanent Double Catheter --  --  Internal jugular  --                            Lab Results: I have reviewed the following results:   Results from last 7 days   Lab Units 11/25/24  0836   WBC Thousand/uL 6.62   HEMOGLOBIN g/dL 8.3*   HEMATOCRIT % 25.8*   PLATELETS Thousands/uL 247   SEGS PCT % 55   LYMPHO PCT % 24   MONO PCT % 10   EOS PCT % 10*     Results from last 7 days   Lab Units 11/25/24  0836 11/24/24  0502 11/23/24  0454   SODIUM mmol/L 129*   < > 131*   POTASSIUM mmol/L 4.7   < > 4.7   CHLORIDE mmol/L 91*   < > 94*   CO2 mmol/L 28   < > 27   BUN mg/dL 48*   < > 50*   CREATININE mg/dL 6.47*   < > 6.61*   ANION GAP mmol/L 10   < > 10   CALCIUM mg/dL 8.7   < > 8.0*   ALBUMIN g/dL  --   --  3.7   TOTAL BILIRUBIN mg/dL  --   --  0.32   ALK PHOS U/L  --   --  95   ALT U/L  --   --  12   AST U/L  --   --  12*   GLUCOSE RANDOM mg/dL 100   < > 95    < > = values in this interval not displayed.         Results from last 7 days   Lab Units 11/25/24  1142 11/25/24  0729 11/24/24  2109 11/24/24  1555 11/24/24  1113 11/24/24  0717 11/23/24  2052 11/23/24  1611 11/23/24  1129 11/23/24  0711 11/22/24  2035 11/22/24  1600   POC GLUCOSE mg/dl 131 99 119 108 115 102 127 146* 98 92 139 136               Recent Cultures (last 7 days):               Last 24 Hours Medication List:     Current Facility-Administered Medications:     acetaminophen (TYLENOL) tablet 650 mg, Q6H PRN    allopurinol (ZYLOPRIM) tablet 100 mg, Daily    aspirin chewable tablet 81 mg, Daily    atorvastatin (LIPITOR) tablet 80 mg, Daily    benzonatate (TESSALON  PERLES) capsule 100 mg, TID PRN    calcium carbonate (TUMS) chewable tablet 750 mg, BID PRN    epoetin jessica (EPOGEN,PROCRIT) injection 3,000 Units, After Dialysis    gabapentin (NEURONTIN) capsule 200 mg, BID    heparin (porcine) subcutaneous injection 5,000 Units, Q12H KEMAL    hydrOXYzine HCL (ATARAX) tablet 25 mg, Q6H PRN    insulin lispro (HumALOG/ADMELOG) 100 units/mL subcutaneous injection 1-6 Units, TID AC **AND** Fingerstick Glucose (POCT), TID AC    insulin lispro (HumALOG/ADMELOG) 100 units/mL subcutaneous injection 1-6 Units, HS    melatonin tablet 3 mg, HS PRN    NIFEdipine (PROCARDIA XL) 24 hr tablet 60 mg, After Dinner    oxyCODONE (ROXICODONE) IR tablet 5 mg, Q8H PRN    oxyCODONE (ROXICODONE) split tablet 2.5 mg, Q8H PRN    polyethylene glycol (MIRALAX) packet 17 g, Daily PRN    sevelamer (RENAGEL) tablet 800 mg, TID With Meals    Administrative Statements   Today, Patient Was Seen By: GRANT Perales      **Please Note: This note may have been constructed using a voice recognition system.**

## 2024-11-25 NOTE — ASSESSMENT & PLAN NOTE
Wt Readings from Last 3 Encounters:   11/19/24 106 kg (234 lb)   11/07/24 106 kg (233 lb 14.5 oz)   10/22/24 97.7 kg (215 lb 6.2 oz)

## 2024-11-25 NOTE — PLAN OF CARE
Target UF Goal 3 L as tolerated. Patient dialyzing for 4 hours on 3 K bath for serum K of  4.3  per protocol. Treatment plan reviewed with Dr. Reyes.   Problem: METABOLIC, FLUID AND ELECTROLYTES - ADULT  Goal: Electrolytes maintained within normal limits  Description: INTERVENTIONS:  - Monitor labs and assess patient for signs and symptoms of electrolyte imbalances  - Administer electrolyte replacement as ordered  - Monitor response to electrolyte replacements, including repeat lab results as appropriate  - Instruct patient on fluid and nutrition as appropriate  Outcome: Progressing  Goal: Fluid balance maintained  Description: INTERVENTIONS:  - Monitor labs   - Monitor I/O and WT  - Instruct patient on fluid and nutrition as appropriate  - Assess for signs & symptoms of volume excess or deficit  Outcome: Progressing   Post-Dialysis RN Treatment Note    Blood Pressure:  Pre 144/60 mm/Hg  Post 143/63 mmHg   EDW:  tbd kg    Weight:  Pre 105.1 kg   Post 102.1 kg   Mode of weight measurement: Standing Scale   Volume Removed:  3000 ml    Treatment duration: 240 minutes    NS given:  No    Treatment shortened No   Medications given during Rx: Epogen 3000 units   Estimated Kt/V:  None Reported   Access type: Permacath/TDC   Needle Gauge:  n/a   Access Issues: No   Verbal repot to primary nurse:   Yes Jenaro Russell RN

## 2024-11-25 NOTE — ASSESSMENT & PLAN NOTE
Lab Results   Component Value Date    EGFR 8 11/25/2024    EGFR 11 11/24/2024    EGFR 8 11/23/2024    CREATININE 6.47 (H) 11/25/2024    CREATININE 4.75 (H) 11/24/2024    CREATININE 6.61 (H) 11/23/2024

## 2024-11-26 LAB
ANION GAP SERPL CALCULATED.3IONS-SCNC: 7 MMOL/L (ref 4–13)
BUN SERPL-MCNC: 30 MG/DL (ref 5–25)
CALCIUM SERPL-MCNC: 8.8 MG/DL (ref 8.4–10.2)
CHLORIDE SERPL-SCNC: 96 MMOL/L (ref 96–108)
CO2 SERPL-SCNC: 27 MMOL/L (ref 21–32)
CREAT SERPL-MCNC: 4.48 MG/DL (ref 0.6–1.3)
ERYTHROCYTE [DISTWIDTH] IN BLOOD BY AUTOMATED COUNT: 14.3 % (ref 11.6–15.1)
GFR SERPL CREATININE-BSD FRML MDRD: 12 ML/MIN/1.73SQ M
GLUCOSE SERPL-MCNC: 120 MG/DL (ref 65–140)
GLUCOSE SERPL-MCNC: 121 MG/DL (ref 65–140)
GLUCOSE SERPL-MCNC: 139 MG/DL (ref 65–140)
GLUCOSE SERPL-MCNC: 92 MG/DL (ref 65–140)
GLUCOSE SERPL-MCNC: 93 MG/DL (ref 65–140)
HCT VFR BLD AUTO: 24.4 % (ref 36.5–49.3)
HGB BLD-MCNC: 8 G/DL (ref 12–17)
MCH RBC QN AUTO: 30.7 PG (ref 26.8–34.3)
MCHC RBC AUTO-ENTMCNC: 32.8 G/DL (ref 31.4–37.4)
MCV RBC AUTO: 94 FL (ref 82–98)
PLATELET # BLD AUTO: 205 THOUSANDS/UL (ref 149–390)
PMV BLD AUTO: 9.4 FL (ref 8.9–12.7)
POTASSIUM SERPL-SCNC: 4.5 MMOL/L (ref 3.5–5.3)
RBC # BLD AUTO: 2.61 MILLION/UL (ref 3.88–5.62)
SODIUM SERPL-SCNC: 130 MMOL/L (ref 135–147)
WBC # BLD AUTO: 6.3 THOUSAND/UL (ref 4.31–10.16)

## 2024-11-26 PROCEDURE — 80048 BASIC METABOLIC PNL TOTAL CA: CPT

## 2024-11-26 PROCEDURE — 82948 REAGENT STRIP/BLOOD GLUCOSE: CPT

## 2024-11-26 PROCEDURE — 94760 N-INVAS EAR/PLS OXIMETRY 1: CPT

## 2024-11-26 PROCEDURE — 99232 SBSQ HOSP IP/OBS MODERATE 35: CPT | Performed by: STUDENT IN AN ORGANIZED HEALTH CARE EDUCATION/TRAINING PROGRAM

## 2024-11-26 PROCEDURE — 99232 SBSQ HOSP IP/OBS MODERATE 35: CPT | Performed by: NURSE PRACTITIONER

## 2024-11-26 PROCEDURE — 85027 COMPLETE CBC AUTOMATED: CPT

## 2024-11-26 RX ADMIN — GABAPENTIN 200 MG: 100 CAPSULE ORAL at 08:27

## 2024-11-26 RX ADMIN — ATORVASTATIN CALCIUM 80 MG: 80 TABLET, FILM COATED ORAL at 08:27

## 2024-11-26 RX ADMIN — GABAPENTIN 200 MG: 100 CAPSULE ORAL at 17:10

## 2024-11-26 RX ADMIN — OXYCODONE HYDROCHLORIDE 5 MG: 5 TABLET ORAL at 12:52

## 2024-11-26 RX ADMIN — NIFEDIPINE 60 MG: 30 TABLET, EXTENDED RELEASE ORAL at 17:10

## 2024-11-26 RX ADMIN — SEVELAMER HYDROCHLORIDE 800 MG: 800 TABLET, FILM COATED ORAL at 16:10

## 2024-11-26 RX ADMIN — SEVELAMER HYDROCHLORIDE 800 MG: 800 TABLET, FILM COATED ORAL at 11:57

## 2024-11-26 RX ADMIN — ASPIRIN 81 MG CHEWABLE TABLET 81 MG: 81 TABLET CHEWABLE at 08:27

## 2024-11-26 RX ADMIN — SEVELAMER HYDROCHLORIDE 800 MG: 800 TABLET, FILM COATED ORAL at 08:27

## 2024-11-26 RX ADMIN — HEPARIN SODIUM 5000 UNITS: 5000 INJECTION, SOLUTION INTRAVENOUS; SUBCUTANEOUS at 08:26

## 2024-11-26 RX ADMIN — HEPARIN SODIUM 5000 UNITS: 5000 INJECTION, SOLUTION INTRAVENOUS; SUBCUTANEOUS at 21:09

## 2024-11-26 RX ADMIN — OXYCODONE HYDROCHLORIDE 5 MG: 5 TABLET ORAL at 21:09

## 2024-11-26 RX ADMIN — ALLOPURINOL 100 MG: 100 TABLET ORAL at 08:27

## 2024-11-26 NOTE — ASSESSMENT & PLAN NOTE
Wt Readings from Last 3 Encounters:   11/19/24 106 kg (234 lb)   11/07/24 106 kg (233 lb 14.5 oz)   10/22/24 97.7 kg (215 lb 6.2 oz)     Chest x-ray showing evidence of pulmonary edema with bilateral pleural effusions secondary to volume overload from acute on chronic diastolic congestive heart failure brought on by his missed dialysis session this weekend.    Fluid management with hemodialysis per nephrology.  Improving  Plan for dialysis 11/27 with discharge post dialysis

## 2024-11-26 NOTE — ASSESSMENT & PLAN NOTE
Lab Results   Component Value Date    EGFR 12 11/26/2024    EGFR 8 11/25/2024    EGFR 11 11/24/2024    CREATININE 4.48 (H) 11/26/2024    CREATININE 6.47 (H) 11/25/2024    CREATININE 4.75 (H) 11/24/2024   As noted above, patient with a missed dialysis session this weekend.    Continue hemodialysis support per recommendations from nephrology.  Plan for dialysis on 11/27 with discharge post dialysis, discussed with patient and he is agreeable and will continue to follow with outpatient dialysis

## 2024-11-26 NOTE — ASSESSMENT & PLAN NOTE
Lab Results   Component Value Date    HGBA1C 5.5 10/08/2024       Recent Labs     11/25/24 2008 11/26/24  0709 11/26/24  1058 11/26/24  1552   POCGLU 110 93 120 121       Blood Sugar Average: Last 72 hrs:  (P) 113.6  Patient will be placed on an insulin sliding scale.  Will continue to monitor blood sugar levels and make further adjustments as needed.

## 2024-11-26 NOTE — ASSESSMENT & PLAN NOTE
Current hemoglobin8 mg/dL  Etiology secondary to  ESRD  Treatment:  Continue Epogen 3000 units on HD  Transfuse for hemoglobin less than 7.0 per primary service

## 2024-11-26 NOTE — ASSESSMENT & PLAN NOTE
LEAD 11/22  Right  50-75% stenosis proximal popliteal artery. >75 stenosis proximal tibio-peroneal trunk  SALVATORE:1.7, MTP: non-compressible. GTP 78 mmHg  Left  Greater than 75% stenosis proximal popliteal artery  SALVATORE: 1.7, MTP non compressible, GTP 50 mmHg.  Continue ASA and statin  Patient refusing intervention at this time and would like to follow up as an outpatient with vascular surgery  Local wound care per podiatry.

## 2024-11-26 NOTE — ASSESSMENT & PLAN NOTE
Patient with elevated troponin levels likely secondary to demand ischemia from acute on chronic diastolic congestive heart failure from end-stage renal disease after a missed dialysis session.    Fluid management with hemodialysis per nephrology.  Denies chest pain

## 2024-11-26 NOTE — ASSESSMENT & PLAN NOTE
Chest x-ray showing evidence of pulmonary edema with bilateral pleural effusion secondary to volume overload from acute on chronic diastolic congestive heart failure brought on by the missed dialysis session this weekend.    Fluid management with hemodialysis per nephrology.   Patient appears euvolemic at this time  Weaned off O2

## 2024-11-26 NOTE — PROGRESS NOTES
Progress Note - Hospitalist   Name: Naresh Carpio 64 y.o. male I MRN: [id number]  Unit/Bed#: [address] [place] [address] I Date of Admission: 11/19/2024   Date of Service: 11/26/2024 I Hospital Day: 7    Assessment & Plan  Acute hypoxic respiratory failure (HCC)  Patient was seen and examined in the emergency department and will be admitted to the hospital for further evaluation and management.  Patient presenting with shortness of breath found to have evidence of acute hypoxic respiratory failure. Chest x-ray showing evidence of pulmonary edema with bilateral pleural effusion secondary to volume overload from acute on chronic diastolic congestive heart failure brought on by the missed dialysis session this weekend.  Fluid management with hemodialysis per nephrology.  Continue supplemental oxygen therapy as needed and wean as tolerated.    Currently on room air.    Discussed with nephrology today, plan for dialysis again on 11/27, okay for discharge post hemodialysis.  Discussed with patient and he is agreeable at this time and will follow-up with his outpatient dialysis      Anemia due to chronic kidney disease, on chronic dialysis (HCC)  Lab Results   Component Value Date    EGFR 12 11/26/2024    EGFR 8 11/25/2024    EGFR 11 11/24/2024    CREATININE 4.48 (H) 11/26/2024    CREATININE 6.47 (H) 11/25/2024    CREATININE 4.75 (H) 11/24/2024     Continue  Epogen 3000 units on HD  Type II diabetes mellitus (HCC)  Lab Results   Component Value Date    HGBA1C 5.5 10/08/2024       Recent Labs     11/25/24  2008 11/26/24  0709 11/26/24  1058 11/26/24  1552   POCGLU 110 93 120 121       Blood Sugar Average: Last 72 hrs:  (P) 113.6  Patient will be placed on an insulin sliding scale.  Will continue to monitor blood sugar levels and make further adjustments as needed.  Hypertension  Continue home medications.   Stable  Chronic kidney disease-mineral and bone disorder  Lab Results   Component Value Date    EGFR 12 11/26/2024    EGFR 8  11/25/2024    EGFR 11 11/24/2024    CREATININE 4.48 (H) 11/26/2024    CREATININE 6.47 (H) 11/25/2024    CREATININE 4.75 (H) 11/24/2024   Dialysis days: Tuesday, Thursday, Saturday  Discussed with nephrology, plan for dialysis on Wednesday due to the holiday  Plan for discharge post dialysis, discussed with patient and he is agreeable  Will resume dialysis on Saturday outpatient  ESRD (end stage renal disease) ([facility])  Lab Results   Component Value Date    EGFR 12 11/26/2024    EGFR 8 11/25/2024    EGFR 11 11/24/2024    CREATININE 4.48 (H) 11/26/2024    CREATININE 6.47 (H) 11/25/2024    CREATININE 4.75 (H) 11/24/2024   As noted above, patient with a missed dialysis session this weekend.    Continue hemodialysis support per recommendations from nephrology.  Plan for dialysis on 11/27 with discharge post dialysis, discussed with patient and he is agreeable and will continue to follow with outpatient dialysis  Paroxysmal atrial fibrillation (HCC)  Continue aspirin 81 mg  EKG shows sinus rhythm 63  Acute on chronic diastolic (congestive) heart failure (HCC)  Wt Readings from Last 3 Encounters:   11/19/24 106 kg (234 lb)   11/07/24 106 kg (233 lb 14.5 oz)   10/22/24 97.7 kg (215 lb 6.2 oz)     Chest x-ray showing evidence of pulmonary edema with bilateral pleural effusions secondary to volume overload from acute on chronic diastolic congestive heart failure brought on by his missed dialysis session this weekend.    Fluid management with hemodialysis per nephrology.  Improving  Plan for dialysis 11/27 with discharge post dialysis  Pulmonary edema  Chest x-ray showing evidence of pulmonary edema with bilateral pleural effusion secondary to volume overload from acute on chronic diastolic congestive heart failure brought on by the missed dialysis session this weekend.    Fluid management with hemodialysis per nephrology.   Patient appears euvolemic at this time  Weaned off O2  Pleural effusion  Chest x-ray showing evidence of  pulmonary edema with bilateral pleural effusion secondary to volume overload from acute on chronic diastolic congestive heart failure brought on by the missed dialysis session this weekend.    Fluid management with hemodialysis per nephrology.   Plan as above  Elevated troponin  Patient with elevated troponin levels likely secondary to demand ischemia from acute on chronic diastolic congestive heart failure from end-stage renal disease after a missed dialysis session.    Fluid management with hemodialysis per nephrology.  Denies chest pain  Chronic wound  Patient with evidence of bilateral venous stasis ulcerations and history of right partial first ray and left fifth digit amputations.  Patient was evaluated by podiatry with plans for local wound care.    Arterial doppler revealed new bilateral popliteal stenosis and increase in stenosis of the right tibio-peroneal trunk  Vascular surgery consult, appreciate input  Patient declined any surgical intervention this admission  Plan for close follow-up outpatient with vascular  Hyponatremia  Secondary to volume overload related to end-stage renal disease. Sodium 130 today.  Nephrology following   Repeat labs in am   PAD (peripheral artery disease) ([facility])  LEAD 11/22  Right  50-75% stenosis proximal popliteal artery. >75 stenosis proximal tibio-peroneal trunk  SALVATORE:1.7, MTP: non-compressible. GTP 78 mmHg  Left  Greater than 75% stenosis proximal popliteal artery  SALVATORE: 1.7, MTP non compressible, GTP 50 mmHg.  Continue ASA and statin  Patient refusing intervention at this time and would like to follow up as an outpatient with vascular surgery  Local wound care per podiatry.          VTE Pharmacologic Prophylaxis:   Moderate Risk (Score 3-4) - Pharmacological DVT Prophylaxis Ordered: heparin.    Mobility:   Basic Mobility Inpatient Raw Score: 24  JH-HLM Goal: 8: Walk 250 feet or more  JH-HLM Achieved: 6: Walk 10 steps or more  JH-HLM Goal achieved. Continue to encourage  appropriate mobility.    Patient Centered Rounds: I performed bedside rounds with nursing staff today.   Discussions with Specialists or Other Care Team Provider: Multidisciplinary team     Education and Discussions with Family / Patient: Patient declined call to .     Current Length of Stay: 7 day(s)  Current Patient Status: Inpatient   Certification Statement: The patient will continue to require additional inpatient hospital stay due to dialysis in am with discharge post dialysis   Discharge Plan: Anticipate discharge tomorrow to home.    Code Status: Level 1 - Full Code    Subjective   Patient seen sitting up on side of the bed, had just been ambulating in the room using walker.  He is off oxygen O2 sats 92-95%.  Reports he is feeling much better than when he first came in breathing has improved.  Good appetite no nausea or vomiting.  We did discuss the importance of maintaining his dialysis appointments to avoid future instances of volume overload.  We also talked about the need for him to follow-up with outpatient vascular surgery for his wounds on his lower extremity which she did verbalize an understanding.    Objective :  Temp:  [97.6 °F (36.4 °C)-98.1 °F (36.7 °C)] 97.7 °F (36.5 °C)  HR:  [60-71] 71  BP: (132-135)/(50-57) 132/51  Resp:  [18-19] 18  SpO2:  [83 %-98 %] 83 %  O2 Device: Mid flow nasal cannula  Nasal Cannula O2 Flow Rate (L/min):  [1 L/min-2 L/min] 1 L/min    Body mass index is 31.74 kg/m².     Input and Output Summary (last 24 hours):     Intake/Output Summary (Last 24 hours) at 11/26/2024 1842  Last data filed at 11/25/2024 2301  Gross per 24 hour   Intake --   Output 250 ml   Net -250 ml       Physical Exam  Vitals and nursing note reviewed.   Constitutional:       Appearance: Normal appearance.   HENT:      Head: Normocephalic.      Nose: Nose normal.      Mouth/Throat:      Mouth: Mucous membranes are moist.   Eyes:      Extraocular Movements: Extraocular movements intact.       Conjunctiva/sclera: Conjunctivae normal.      Pupils: Pupils are equal, round, and reactive to light.   Cardiovascular:      Rate and Rhythm: Normal rate and regular rhythm.      Pulses: Normal pulses.      Heart sounds: Normal heart sounds.   Pulmonary:      Effort: Pulmonary effort is normal.      Breath sounds: Normal breath sounds.   Abdominal:      General: Bowel sounds are normal. There is no distension.      Palpations: Abdomen is soft.      Tenderness: There is no abdominal tenderness.   Genitourinary:     Comments: Voiding spontaneously   Musculoskeletal:         General: Normal range of motion.      Cervical back: Normal range of motion.      Right lower leg: No edema.      Left lower leg: No edema.   Skin:     Comments: Right tunneled cath present on right ACW; left leg with multiple wounds dressed; right leg wound dressing noted    Neurological:      General: No focal deficit present.      Mental Status: He is alert and oriented to person, place, and time.   Psychiatric:         Mood and Affect: Mood normal.         Behavior: Behavior normal.         Thought Content: Thought content normal.         Judgment: Judgment normal.           Lines/Drains:  Lines/Drains/Airways       Active Status       Name Placement date Placement time Site Days    HD Permanent Double Catheter --  --  Internal jugular  --                            Lab Results: I have reviewed the following results:   Results from last 7 days   Lab Units 11/26/24  0514 11/25/24  0836   WBC Thousand/uL 6.30 6.62   HEMOGLOBIN g/dL 8.0* 8.3*   HEMATOCRIT % 24.4* 25.8*   PLATELETS Thousands/uL 205 247   SEGS PCT %  --  55   LYMPHO PCT %  --  24   MONO PCT %  --  10   EOS PCT %  --  10*     Results from last 7 days   Lab Units 11/26/24  0514 11/24/24  0502 11/23/24  0454   SODIUM mmol/L 130*   < > 131*   POTASSIUM mmol/L 4.5   < > 4.7   CHLORIDE mmol/L 96   < > 94*   CO2 mmol/L 27   < > 27   BUN mg/dL 30*   < > 50*   CREATININE mg/dL 4.48*   <  > 6.61*   ANION GAP mmol/L 7   < > 10   CALCIUM mg/dL 8.8   < > 8.0*   ALBUMIN g/dL  --   --  3.7   TOTAL BILIRUBIN mg/dL  --   --  0.32   ALK PHOS U/L  --   --  95   ALT U/L  --   --  12   AST U/L  --   --  12*   GLUCOSE RANDOM mg/dL 92   < > 95    < > = values in this interval not displayed.         Results from last 7 days   Lab Units 11/26/24  1552 11/26/24  1058 11/26/24  0709 11/25/24 2008 11/25/24  1558 11/25/24  1142 11/25/24  0729 11/24/24  2109 11/24/24  1555 11/24/24  1113 11/24/24  0717 11/23/24 2052   POC GLUCOSE mg/dl 121 120 93 110 123 131 99 119 108 115 102 127               Recent Cultures (last 7 days):         Imaging Results Review: No pertinent imaging studies reviewed.  Other Study Results Review: No additional pertinent studies reviewed.    Last 24 Hours Medication List:     Current Facility-Administered Medications:     acetaminophen (TYLENOL) tablet 650 mg, Q6H PRN    allopurinol (ZYLOPRIM) tablet 100 mg, Daily    aspirin chewable tablet 81 mg, Daily    atorvastatin (LIPITOR) tablet 80 mg, Daily    benzonatate (TESSALON PERLES) capsule 100 mg, TID PRN    calcium carbonate (TUMS) chewable tablet 750 mg, BID PRN    epoetin jessica (EPOGEN,PROCRIT) injection 3,000 Units, After Dialysis    gabapentin (NEURONTIN) capsule 200 mg, BID    heparin (porcine) subcutaneous injection 5,000 Units, Q12H KEMAL    hydrOXYzine HCL (ATARAX) tablet 25 mg, Q6H PRN    insulin lispro (HumALOG/ADMELOG) 100 units/mL subcutaneous injection 1-6 Units, TID AC **AND** Fingerstick Glucose (POCT), TID AC    insulin lispro (HumALOG/ADMELOG) 100 units/mL subcutaneous injection 1-6 Units, HS    melatonin tablet 3 mg, HS PRN    NIFEdipine (PROCARDIA XL) 24 hr tablet 60 mg, After Dinner    oxyCODONE (ROXICODONE) IR tablet 5 mg, Q8H PRN    oxyCODONE (ROXICODONE) split tablet 2.5 mg, Q8H PRN    polyethylene glycol (MIRALAX) packet 17 g, Daily PRN    sevelamer (RENAGEL) tablet 800 mg, TID With Meals    Administrative Statements    Today, Patient Was Seen By: GRANT Valdivia  I have spent a total time of rater than 45 minutes in caring for this patient on the day of the visit/encounter including Diagnostic results, Impressions, Counseling / Coordination of care, Documenting in the medical record, Reviewing / ordering tests, medicine, procedures  , Obtaining or reviewing history  , and Communicating with other healthcare professionals .    **Please Note: This note may have been constructed using a voice recognition system.**

## 2024-11-26 NOTE — ASSESSMENT & PLAN NOTE
Chest x-ray showing evidence of pulmonary edema with bilateral pleural effusion secondary to volume overload from acute on chronic diastolic congestive heart failure brought on by the missed dialysis session this weekend.    Fluid management with hemodialysis per nephrology.   Plan as above

## 2024-11-26 NOTE — ASSESSMENT & PLAN NOTE
Volume: Euvolemic  Blood pressure: Normotensive, /57  Will continue UF on HD   Continue with nifedipine 60 mg daily

## 2024-11-26 NOTE — ASSESSMENT & PLAN NOTE
Patient was seen and examined in the emergency department and will be admitted to the hospital for further evaluation and management.  Patient presenting with shortness of breath found to have evidence of acute hypoxic respiratory failure. Chest x-ray showing evidence of pulmonary edema with bilateral pleural effusion secondary to volume overload from acute on chronic diastolic congestive heart failure brought on by the missed dialysis session this weekend.  Fluid management with hemodialysis per nephrology.  Continue supplemental oxygen therapy as needed and wean as tolerated.    Currently on room air.    Discussed with nephrology today, plan for dialysis again on 11/27, okay for discharge post hemodialysis.  Discussed with patient and he is agreeable at this time and will follow-up with his outpatient dialysis

## 2024-11-26 NOTE — ASSESSMENT & PLAN NOTE
Lab Results   Component Value Date    EGFR 12 11/26/2024    EGFR 8 11/25/2024    EGFR 11 11/24/2024    CREATININE 4.48 (H) 11/26/2024    CREATININE 6.47 (H) 11/25/2024    CREATININE 4.75 (H) 11/24/2024     Continue  Epogen 3000 units on HD

## 2024-11-26 NOTE — ASSESSMENT & PLAN NOTE
Lab Results   Component Value Date    EGFR 12 11/26/2024    EGFR 8 11/25/2024    EGFR 11 11/24/2024    CREATININE 4.48 (H) 11/26/2024    CREATININE 6.47 (H) 11/25/2024    CREATININE 4.75 (H) 11/24/2024   Dialysis days: Tuesday, Thursday, Saturday  Discussed with nephrology, plan for dialysis on Wednesday due to the holiday  Plan for discharge post dialysis, discussed with patient and he is agreeable  Will resume dialysis on Saturday outpatient

## 2024-11-26 NOTE — ASSESSMENT & PLAN NOTE
Secondary to volume overload related to end-stage renal disease. Sodium 130 today.  Nephrology following   Repeat labs in am

## 2024-11-26 NOTE — ASSESSMENT & PLAN NOTE
Patient with evidence of bilateral venous stasis ulcerations and history of right partial first ray and left fifth digit amputations.  Patient was evaluated by podiatry with plans for local wound care.    Arterial doppler revealed new bilateral popliteal stenosis and increase in stenosis of the right tibio-peroneal trunk  Vascular surgery consult, appreciate input  Patient declined any surgical intervention this admission  Plan for close follow-up outpatient with vascular

## 2024-11-26 NOTE — PROGRESS NOTES
Progress Note - Nephrology   Name: Naresh Carpio 64 y.o. male I MRN: [id number]  Unit/Bed#: [address] [address] I Date of Admission: 11/19/2024   Date of Service: 11/26/2024 I Hospital Day: 7     Assessment & Plan  ESRD (end stage renal disease) (HCC)  Dialysis unit/days: [facility]  Access: PermCath  Patient is TTS  Had dialysis yesterday due to holiday schedule, UF 3 L.  Plan for dialysis on Wednesday and Saturday    kg  Renal Diet  Fluid restriction 1-1.5L/d  Adjust medications to GFR<10  Avoid opioids   Stable for discharge from a and      Anemia due to chronic kidney disease, on chronic dialysis (HCC)    Current hemoglobin8 mg/dL  Etiology secondary to  ESRD  Treatment:  Continue Epogen 3000 units on HD  Transfuse for hemoglobin less than 7.0 per primary service  Hypertension  Volume: Euvolemic  Blood pressure: Normotensive, /57  Will continue UF on HD   Continue with nifedipine 60 mg daily  Chronic kidney disease-mineral and bone disorder  Continue with sevelamer 800 mg 3 times daily with meals  Type II diabetes mellitus (HCC)  HbA1c 5.5  Advised to maintain a good DM control  Maintain healthy diet (vegetables, fruits, whole grains, nonfat or low fat)  Weight loss  Physical activity (5 to 10 minutes to start the increase to 30 min a day)    Acute on chronic diastolic (congestive) heart failure (HCC)  Will continue ultrafiltration on dialysis  Chronic wound  Management as per primary team     I have reviewed the nephrology recommendations including HD tomorrow if remains inpatient , with primary team , and we are in agreement with renal plan including the information outlined above. I have discussed the above management plan in detail with the primary service.     Subjective   Brief History of Admission - 65 yo man with PMH of ESRD on HD, hypertension, diabetes, chronic lower extremity wounds p/w shortness of breath.  Nephrology is consulted for management of ESRD     Had HD yesterday , well tolerated .  No SOB     Objective :  Temp:  [96.8 °F (36 °C)-98 °F (36.7 °C)] 97.6 °F (36.4 °C)  HR:  [59-69] 69  BP: (127-149)/(56-95) 135/57  Resp:  [16-19] 19  SpO2:  [91 %-98 %] 98 %  O2 Device: Mid flow nasal cannula  Nasal Cannula O2 Flow Rate (L/min):  [2 L/min] 2 L/min    Current Weight: Weight - Scale: 106 kg (234 lb)  First Weight: Weight - Scale: 106 kg (234 lb)  I/O         11/24 0701  11/25 0700 11/25 0701 11/26 0700    P.O. 780 440    I.V. (mL/kg)  500 (4.7)    Total Intake(mL/kg) 780 (7.4) 940 (8.9)    Urine (mL/kg/hr) 300 (0.1) 250 (0.1)    Other  3500    Total Output 300 3750    Net +480 -2810                Physical Exam  General:  no acute distress at this time  Skin:  No acute rash  Eyes:  No scleral icterus and noninjected  ENT:  mucous membranes moist  Neck:  no carotid bruits  Chest:  Clear to auscultation percussion, good respiratory effort, no use of accessory respiratory muscles  CVS:  Regular rate and rhythm without rub   Abdomen:  soft and nontender   Extremities: lower extremity wound  Neuro:  No gross focality  Psych:  Alert , cooperative   Dialysis access: PermCath    Medications:    Current Facility-Administered Medications:     acetaminophen (TYLENOL) tablet 650 mg, 650 mg, Oral, Q6H PRN, Milan Ruiz MD    allopurinol (ZYLOPRIM) tablet 100 mg, 100 mg, Oral, Daily, Mlian Riuz MD, 100 mg at 11/25/24 1200    aspirin chewable tablet 81 mg, 81 mg, Oral, Daily, Milan Ruiz MD, 81 mg at 11/25/24 1200    atorvastatin (LIPITOR) tablet 80 mg, 80 mg, Oral, Daily, Milan Ruiz MD, 80 mg at 11/25/24 1200    benzonatate (TESSALON PERLES) capsule 100 mg, 100 mg, Oral, TID PRN, GRANT Church    calcium carbonate (TUMS) chewable tablet 750 mg, 750 mg, Oral, BID PRN, GRANT Rich, 750 mg at 11/24/24 2003    epoetin jessica (EPOGEN,PROCRIT) injection 3,000 Units, 3,000 Units, Intravenous, After Dialysis, Joselyn Reyes Bahamonde, MD, 3,000 Units at 11/25/24 0250     gabapentin (NEURONTIN) capsule 200 mg, 200 mg, Oral, BID, Milan Ruiz MD, 200 mg at 11/25/24 1724    heparin (porcine) subcutaneous injection 5,000 Units, 5,000 Units, Subcutaneous, Q12H KEMAL, Milan Ruiz MD, 5,000 Units at 11/25/24 2102    hydrOXYzine HCL (ATARAX) tablet 25 mg, 25 mg, Oral, Q6H PRN, GRANT Church, 25 mg at 11/23/24 0450    insulin lispro (HumALOG/ADMELOG) 100 units/mL subcutaneous injection 1-6 Units, 1-6 Units, Subcutaneous, TID AC **AND** Fingerstick Glucose (POCT), , , TID AC, Milan Ruiz MD    insulin lispro (HumALOG/ADMELOG) 100 units/mL subcutaneous injection 1-6 Units, 1-6 Units, Subcutaneous, HS, Milan Ruiz MD    melatonin tablet 3 mg, 3 mg, Oral, HS PRN, GRANT Church, 3 mg at 11/22/24 2223    NIFEdipine (PROCARDIA XL) 24 hr tablet 60 mg, 60 mg, Oral, After Dinner, Milan Ruiz MD, 60 mg at 11/25/24 1723    oxyCODONE (ROXICODONE) IR tablet 5 mg, 5 mg, Oral, Q8H PRN, Milan Ruiz MD, 5 mg at 11/25/24 1723    oxyCODONE (ROXICODONE) split tablet 2.5 mg, 2.5 mg, Oral, Q8H PRN, Milan Ruiz MD, 2.5 mg at 11/25/24 2317    polyethylene glycol (MIRALAX) packet 17 g, 17 g, Oral, Daily PRN, Milan Ruiz MD    sevelamer (RENAGEL) tablet 800 mg, 800 mg, Oral, TID With Meals, German Linton MD, 800 mg at 11/25/24 1649      Lab Results: I have reviewed the following results:  Results from last 7 days   Lab Units 11/26/24  0514 11/25/24  0836 11/24/24  0502 11/23/24  0454 11/22/24  0350 11/21/24  0600 11/20/24  0847 11/19/24  1042   WBC Thousand/uL 6.30 6.62 6.18 6.04 6.80 6.32 7.61 9.52   HEMOGLOBIN g/dL 8.0* 8.3* 8.0* 8.2* 8.6* 7.9* 7.6* 8.2*   HEMATOCRIT % 24.4* 25.8* 24.6* 25.8* 27.2* 25.1* 23.8* 26.5*   PLATELETS Thousands/uL 205 247 235 245 308 242 252 270   POTASSIUM mmol/L 4.5 4.7 4.3 4.7 4.3 4.9 4.4 4.9   CHLORIDE mmol/L 96 91* 93* 94* 91* 95* 94* 100   CO2 mmol/L 27 28 27 27 28 26 29 23   BUN mg/dL  "30* 48* 32* 50* 37* 59* 48* 74*   CREATININE mg/dL 4.48* 6.47* 4.75* 6.61* 5.04* 7.12* 6.18* 9.08*   CALCIUM mg/dL 8.8 8.7 8.0* 8.0* 8.4 8.4 8.3* 9.1   MAGNESIUM mg/dL  --   --  2.1  --  2.0 2.1 2.0  --    PHOSPHORUS mg/dL  --   --   --   --   --  5.1*  --   --    ALBUMIN g/dL  --   --   --  3.7  --  3.6  --  3.9       Administrative Statements     Portions of the record may have been created with voice recognition software. Occasional wrong word or \"sound a like\" substitutions may have occurred due to the inherent limitations of voice recognition software. Read the chart carefully and recognize, using context, where substitutions have occurred.If you have any questions, please contact the dictating provider.  "

## 2024-11-26 NOTE — ASSESSMENT & PLAN NOTE
Dialysis unit/days: [facility]  Access: PermCath  Patient is TTS  Had dialysis yesterday due to holiday schedule, UF 3 L.  Plan for dialysis on Wednesday and Saturday    kg  Renal Diet  Fluid restriction 1-1.5L/d  Adjust medications to GFR<10  Avoid opioids   Stable for discharge from a and

## 2024-11-27 ENCOUNTER — APPOINTMENT (INPATIENT)
Dept: DIALYSIS | Facility: HOSPITAL | Age: 64
DRG: 291 | End: 2024-11-27
Attending: INTERNAL MEDICINE
Payer: MEDICARE

## 2024-11-27 LAB
ANION GAP SERPL CALCULATED.3IONS-SCNC: 8 MMOL/L (ref 4–13)
BUN SERPL-MCNC: 46 MG/DL (ref 5–25)
CALCIUM SERPL-MCNC: 8.4 MG/DL (ref 8.4–10.2)
CHLORIDE SERPL-SCNC: 94 MMOL/L (ref 96–108)
CO2 SERPL-SCNC: 27 MMOL/L (ref 21–32)
CREAT SERPL-MCNC: 6.39 MG/DL (ref 0.6–1.3)
ERYTHROCYTE [DISTWIDTH] IN BLOOD BY AUTOMATED COUNT: 14.3 % (ref 11.6–15.1)
GFR SERPL CREATININE-BSD FRML MDRD: 8 ML/MIN/1.73SQ M
GLUCOSE SERPL-MCNC: 105 MG/DL (ref 65–140)
GLUCOSE SERPL-MCNC: 106 MG/DL (ref 65–140)
GLUCOSE SERPL-MCNC: 111 MG/DL (ref 65–140)
GLUCOSE SERPL-MCNC: 128 MG/DL (ref 65–140)
GLUCOSE SERPL-MCNC: 94 MG/DL (ref 65–140)
HCT VFR BLD AUTO: 25.7 % (ref 36.5–49.3)
HGB BLD-MCNC: 8.2 G/DL (ref 12–17)
MCH RBC QN AUTO: 29.9 PG (ref 26.8–34.3)
MCHC RBC AUTO-ENTMCNC: 31.9 G/DL (ref 31.4–37.4)
MCV RBC AUTO: 94 FL (ref 82–98)
PLATELET # BLD AUTO: 225 THOUSANDS/UL (ref 149–390)
PMV BLD AUTO: 10 FL (ref 8.9–12.7)
POTASSIUM SERPL-SCNC: 4.9 MMOL/L (ref 3.5–5.3)
RBC # BLD AUTO: 2.74 MILLION/UL (ref 3.88–5.62)
SODIUM SERPL-SCNC: 129 MMOL/L (ref 135–147)
WBC # BLD AUTO: 6.21 THOUSAND/UL (ref 4.31–10.16)

## 2024-11-27 PROCEDURE — 82948 REAGENT STRIP/BLOOD GLUCOSE: CPT

## 2024-11-27 PROCEDURE — 94760 N-INVAS EAR/PLS OXIMETRY 1: CPT

## 2024-11-27 PROCEDURE — 94761 N-INVAS EAR/PLS OXIMETRY MLT: CPT

## 2024-11-27 PROCEDURE — 85027 COMPLETE CBC AUTOMATED: CPT | Performed by: NURSE PRACTITIONER

## 2024-11-27 PROCEDURE — 99232 SBSQ HOSP IP/OBS MODERATE 35: CPT | Performed by: STUDENT IN AN ORGANIZED HEALTH CARE EDUCATION/TRAINING PROGRAM

## 2024-11-27 PROCEDURE — 80048 BASIC METABOLIC PNL TOTAL CA: CPT | Performed by: NURSE PRACTITIONER

## 2024-11-27 RX ADMIN — EPOETIN ALFA 3000 UNITS: 3000 SOLUTION INTRAVENOUS; SUBCUTANEOUS at 12:43

## 2024-11-27 RX ADMIN — NIFEDIPINE 60 MG: 30 TABLET, EXTENDED RELEASE ORAL at 17:15

## 2024-11-27 RX ADMIN — HEPARIN SODIUM 5000 UNITS: 5000 INJECTION, SOLUTION INTRAVENOUS; SUBCUTANEOUS at 08:02

## 2024-11-27 RX ADMIN — OXYCODONE HYDROCHLORIDE 5 MG: 5 TABLET ORAL at 08:52

## 2024-11-27 RX ADMIN — SEVELAMER HYDROCHLORIDE 800 MG: 800 TABLET, FILM COATED ORAL at 16:14

## 2024-11-27 RX ADMIN — GABAPENTIN 200 MG: 100 CAPSULE ORAL at 17:16

## 2024-11-27 RX ADMIN — GABAPENTIN 200 MG: 100 CAPSULE ORAL at 08:02

## 2024-11-27 RX ADMIN — SEVELAMER HYDROCHLORIDE 800 MG: 800 TABLET, FILM COATED ORAL at 12:42

## 2024-11-27 RX ADMIN — HEPARIN SODIUM 5000 UNITS: 5000 INJECTION, SOLUTION INTRAVENOUS; SUBCUTANEOUS at 21:21

## 2024-11-27 RX ADMIN — HYDROXYZINE HYDROCHLORIDE 25 MG: 25 TABLET ORAL at 18:09

## 2024-11-27 RX ADMIN — OXYCODONE HYDROCHLORIDE 5 MG: 5 TABLET ORAL at 17:15

## 2024-11-27 NOTE — PLAN OF CARE
Post-Dialysis RN Treatment Note    Blood Pressure:  Pre: 126/52 mm/Hg  Post: 115/58 mmHg   EDW:  TBD   Weight:  Pre: 104.2  kg   Post: 101.7  kg   Mode of weight measurement: Standing scale   Volume Removed:   2500 ml    Treatment duration:  minutes    NS given:  No    Treatment shortened:   No    Medications given during Rx: None Reported   Estimated Kt/V:  Not Applicable   Access type: Permacath/TDC   Needle Gauge: N/A   Access Issues: No    Report called to primary nurse:   Yes     Problem: METABOLIC, FLUID AND ELECTROLYTES - ADULT  Goal: Electrolytes maintained within normal limits  Description: INTERVENTIONS:  - Monitor labs and assess patient for signs and symptoms of electrolyte imbalances  - Administer electrolyte replacement as ordered  - Monitor response to electrolyte replacements, including repeat lab results as appropriate  - Instruct patient on fluid and nutrition as appropriate  Outcome: Progressing  Goal: Fluid balance maintained  Description: INTERVENTIONS:  - Monitor labs   - Monitor I/O and WT  - Instruct patient on fluid and nutrition as appropriate  - Assess for signs & symptoms of volume excess or deficit  Outcome: Progressing

## 2024-11-27 NOTE — ASSESSMENT & PLAN NOTE
Lab Results   Component Value Date    EGFR 8 11/27/2024    EGFR 12 11/26/2024    EGFR 8 11/25/2024    CREATININE 6.39 (H) 11/27/2024    CREATININE 4.48 (H) 11/26/2024    CREATININE 6.47 (H) 11/25/2024   Dialysis days: Tuesday, Thursday, Saturday  Discussed with nephrology, plan for dialysis on Wednesday due to the holiday  Plan for discharge post dialysis, discussed with patient and he is agreeable  Will resume dialysis on Saturday outpatient

## 2024-11-27 NOTE — ASSESSMENT & PLAN NOTE
Wt Readings from Last 3 Encounters:   11/19/24 106 kg (234 lb)   11/07/24 106 kg (233 lb 14.5 oz)   10/22/24 97.7 kg (215 lb 6.2 oz)     Chest x-ray showing evidence of pulmonary edema with bilateral pleural effusions secondary to volume overload from acute on chronic diastolic congestive heart failure brought on by his missed dialysis session this weekend.    Fluid management with hemodialysis per nephrology.  Improving  Plan for dialysis 11/27 with discharge post dialysis  Patient has transportation set up for 11:00 on 11/28/2024

## 2024-11-27 NOTE — ASSESSMENT & PLAN NOTE
Secondary to volume overload related to end-stage renal disease. Sodium 130 today.  Nephrology following

## 2024-11-27 NOTE — ASSESSMENT & PLAN NOTE
Lab Results   Component Value Date    HGBA1C 5.5 10/08/2024       Recent Labs     11/26/24  2019 11/27/24  0724 11/27/24  1159 11/27/24  1615   POCGLU 139 106 111 128       Blood Sugar Average: Last 72 hrs:  (P) 115  Patient will be placed on an insulin sliding scale.  Will continue to monitor blood sugar levels and make further adjustments as needed.

## 2024-11-27 NOTE — ASSESSMENT & PLAN NOTE
Current hemoglobin: 8 mg/dL  Etiology secondary to  ESRD  Treatment:  Continue Epogen 3000 units on HD  Transfuse for hemoglobin less than 7.0 per primary service

## 2024-11-27 NOTE — PROGRESS NOTES
Progress Note - Hospitalist   Name: Naresh Carpio 64 y.o. male I MRN: [id number]  Unit/Bed#: [address] [place] [address] I Date of Admission: 11/19/2024   Date of Service: 11/27/2024 I Hospital Day: 8     Assessment & Plan  Acute hypoxic respiratory failure (HCC)  Patient was seen and examined in the emergency department and will be admitted to the hospital for further evaluation and management.  Patient presenting with shortness of breath found to have evidence of acute hypoxic respiratory failure. Chest x-ray showing evidence of pulmonary edema with bilateral pleural effusion secondary to volume overload from acute on chronic diastolic congestive heart failure brought on by the missed dialysis session this weekend.  Fluid management with hemodialysis per nephrology.  Continue supplemental oxygen therapy as needed and wean as tolerated.    Currently on room air.    Discussed with nephrology today, plan for dialysis again on 11/27, okay for discharge post hemodialysis.  Discussed with patient and he is agreeable at this time and will follow-up with his outpatient dialysis      Anemia due to chronic kidney disease, on chronic dialysis (HCC)  Lab Results   Component Value Date    EGFR 8 11/27/2024    EGFR 12 11/26/2024    EGFR 8 11/25/2024    CREATININE 6.39 (H) 11/27/2024    CREATININE 4.48 (H) 11/26/2024    CREATININE 6.47 (H) 11/25/2024     Continue  Epogen 3000 units on HD  Type II diabetes mellitus (HCC)  Lab Results   Component Value Date    HGBA1C 5.5 10/08/2024       Recent Labs     11/26/24  2019 11/27/24  0724 11/27/24  1159 11/27/24  1615   POCGLU 139 106 111 128       Blood Sugar Average: Last 72 hrs:  (P) 115  Patient will be placed on an insulin sliding scale.  Will continue to monitor blood sugar levels and make further adjustments as needed.  Hypertension  Continue home medications.   Stable  Chronic kidney disease-mineral and bone disorder  Lab Results   Component Value Date    EGFR 8 11/27/2024    EGFR 12  11/26/2024    EGFR 8 11/25/2024    CREATININE 6.39 (H) 11/27/2024    CREATININE 4.48 (H) 11/26/2024    CREATININE 6.47 (H) 11/25/2024   Dialysis days: Tuesday, Thursday, Saturday  Discussed with nephrology, plan for dialysis on Wednesday due to the holiday  Plan for discharge post dialysis, discussed with patient and he is agreeable  Will resume dialysis on Saturday outpatient  ESRD (end stage renal disease) ([facility])  Lab Results   Component Value Date    EGFR 8 11/27/2024    EGFR 12 11/26/2024    EGFR 8 11/25/2024    CREATININE 6.39 (H) 11/27/2024    CREATININE 4.48 (H) 11/26/2024    CREATININE 6.47 (H) 11/25/2024   As noted above, patient with a missed dialysis session this weekend.    Continue hemodialysis support per recommendations from nephrology.  Plan for dialysis on 11/27 with discharge post dialysis, discussed with patient and he is agreeable and will continue to follow with outpatient dialysis  Paroxysmal atrial fibrillation (HCC)  Continue aspirin 81 mg  EKG shows sinus rhythm 63  Acute on chronic diastolic (congestive) heart failure (HCC)  Wt Readings from Last 3 Encounters:   11/19/24 106 kg (234 lb)   11/07/24 106 kg (233 lb 14.5 oz)   10/22/24 97.7 kg (215 lb 6.2 oz)     Chest x-ray showing evidence of pulmonary edema with bilateral pleural effusions secondary to volume overload from acute on chronic diastolic congestive heart failure brought on by his missed dialysis session this weekend.    Fluid management with hemodialysis per nephrology.  Improving  Plan for dialysis 11/27 with discharge post dialysis  Patient has transportation set up for 11:00 on 11/28/2024  Pulmonary edema  Chest x-ray showing evidence of pulmonary edema with bilateral pleural effusion secondary to volume overload from acute on chronic diastolic congestive heart failure brought on by the missed dialysis session this weekend.    Fluid management with hemodialysis per nephrology.   Patient appears euvolemic at this time  Weaned  off O2  Pleural effusion  Chest x-ray showing evidence of pulmonary edema with bilateral pleural effusion secondary to volume overload from acute on chronic diastolic congestive heart failure brought on by the missed dialysis session this weekend.    Fluid management with hemodialysis per nephrology.   Plan as above  Elevated troponin  Patient with elevated troponin levels likely secondary to demand ischemia from acute on chronic diastolic congestive heart failure from end-stage renal disease after a missed dialysis session.    Fluid management with hemodialysis per nephrology.  Denies chest pain  Chronic wound  Patient with evidence of bilateral venous stasis ulcerations and history of right partial first ray and left fifth digit amputations.  Patient was evaluated by podiatry with plans for local wound care.    Arterial doppler revealed new bilateral popliteal stenosis and increase in stenosis of the right tibio-peroneal trunk  Vascular surgery consult, appreciate input  Patient declined any surgical intervention this admission  Plan for close follow-up outpatient with vascular  Hyponatremia  Secondary to volume overload related to end-stage renal disease. Sodium 130 today.  Nephrology following   PAD (peripheral artery disease) ([facility])  LEAD 11/22  Right  50-75% stenosis proximal popliteal artery. >75 stenosis proximal tibio-peroneal trunk  SALVATORE:1.7, MTP: non-compressible. GTP 78 mmHg  Left  Greater than 75% stenosis proximal popliteal artery  SALVATORE: 1.7, MTP non compressible, GTP 50 mmHg.  Continue ASA and statin  Patient refusing intervention at this time and would like to follow up as an outpatient with vascular surgery  Local wound care per podiatry.          VTE Pharmacologic Prophylaxis:   Moderate Risk (Score 3-4) - Pharmacological DVT Prophylaxis Ordered: heparin.    Mobility:   Basic Mobility Inpatient Raw Score: 24  JH-HLM Goal: 8: Walk 250 feet or more  JH-HLM Achieved: 8: Walk 250 feet ot more  JH-HLM Goal  NOT achieved. Continue with multidisciplinary rounding and encourage appropriate mobility to improve upon -[facility] goals.    Patient Centered Rounds: I performed bedside rounds with nursing staff today.   Discussions with Specialists or Other Care Team Provider: Appreciate nephrology recommendations    Education and Discussions with Family / Patient: Lucinda with family    Current Length of Stay: 8 day(s)  Current Patient Status: Inpatient   Certification Statement: The patient will continue to require additional inpatient hospital stay due to acute hypoxic respiratory failure, HD  Discharge Plan: Appreciate case management recommendations, patient will be discharged tomorrow, 11 AM plan.    Code Status: Level 1 - Full Code    Subjective   Patient seen and examined today at bedside, hemodialysis today at bedside, patient notes that he is feeling very weak, we did discuss discharge planning, patient in agreement of discharge tomorrow morning.    Objective :  Temp:  [97.6 °F (36.4 °C)-98.2 °F (36.8 °C)] 98.2 °F (36.8 °C)  HR:  [56-71] 67  BP: ()/(44-65) 125/44  Resp:  [16-19] 16  SpO2:  [83 %-95 %] 93 %  O2 Device: Mid flow nasal cannula  Nasal Cannula O2 Flow Rate (L/min):  [1 L/min] 1 L/min    Body mass index is 31.74 kg/m².     Input and Output Summary (last 24 hours):     Intake/Output Summary (Last 24 hours) at 11/27/2024 1714  Last data filed at 11/27/2024 1229  Gross per 24 hour   Intake 500 ml   Output 3275 ml   Net -2775 ml       Physical Exam  Vitals and nursing note reviewed.   Constitutional:       General: He is not in acute distress.     Appearance: He is well-developed.   HENT:      Head: Normocephalic and atraumatic.   Eyes:      Conjunctiva/sclera: Conjunctivae normal.   Cardiovascular:      Rate and Rhythm: Normal rate and regular rhythm.      Pulses: Normal pulses.      Heart sounds: No murmur heard.  Pulmonary:      Effort: Pulmonary effort is normal. No respiratory distress.      Breath sounds:  Normal breath sounds.   Abdominal:      Palpations: Abdomen is soft.      Tenderness: There is no abdominal tenderness.   Musculoskeletal:         General: No swelling. Normal range of motion.      Cervical back: Neck supple.   Skin:     General: Skin is warm and dry.      Capillary Refill: Capillary refill takes less than 2 seconds.      Coloration: Skin is not jaundiced.   Neurological:      General: No focal deficit present.      Mental Status: He is alert and oriented to person, place, and time. Mental status is at baseline.   Psychiatric:         Mood and Affect: Mood normal.           Lines/Drains:  Lines/Drains/Airways       Active Status       Name Placement date Placement time Site Days    HD Permanent Double Catheter --  --  Internal jugular  --                            Lab Results: I have reviewed the following results:   Results from last 7 days   Lab Units 11/27/24  0825 11/26/24  0514 11/25/24  0836   WBC Thousand/uL 6.21   < > 6.62   HEMOGLOBIN g/dL 8.2*   < > 8.3*   HEMATOCRIT % 25.7*   < > 25.8*   PLATELETS Thousands/uL 225   < > 247   SEGS PCT %  --   --  55   LYMPHO PCT %  --   --  24   MONO PCT %  --   --  10   EOS PCT %  --   --  10*    < > = values in this interval not displayed.     Results from last 7 days   Lab Units 11/27/24  0825 11/24/24  0502 11/23/24  0454   SODIUM mmol/L 129*   < > 131*   POTASSIUM mmol/L 4.9   < > 4.7   CHLORIDE mmol/L 94*   < > 94*   CO2 mmol/L 27   < > 27   BUN mg/dL 46*   < > 50*   CREATININE mg/dL 6.39*   < > 6.61*   ANION GAP mmol/L 8   < > 10   CALCIUM mg/dL 8.4   < > 8.0*   ALBUMIN g/dL  --   --  3.7   TOTAL BILIRUBIN mg/dL  --   --  0.32   ALK PHOS U/L  --   --  95   ALT U/L  --   --  12   AST U/L  --   --  12*   GLUCOSE RANDOM mg/dL 94   < > 95    < > = values in this interval not displayed.         Results from last 7 days   Lab Units 11/27/24  1615 11/27/24  1159 11/27/24  0724 11/26/24  2019 11/26/24  1552 11/26/24  1058 11/26/24  0709 11/25/24  2008  11/25/24  1558 11/25/24  1142 11/25/24  0729 11/24/24  2109   POC GLUCOSE mg/dl 128 111 106 139 121 120 93 110 123 131 99 119               Recent Cultures (last 7 days):           Last 24 Hours Medication List:     Current Facility-Administered Medications:     acetaminophen (TYLENOL) tablet 650 mg, Q6H PRN    allopurinol (ZYLOPRIM) tablet 100 mg, Daily    aspirin chewable tablet 81 mg, Daily    atorvastatin (LIPITOR) tablet 80 mg, Daily    benzonatate (TESSALON PERLES) capsule 100 mg, TID PRN    calcium carbonate (TUMS) chewable tablet 750 mg, BID PRN    epoetin jessica (EPOGEN,PROCRIT) injection 3,000 Units, After Dialysis    gabapentin (NEURONTIN) capsule 200 mg, BID    heparin (porcine) subcutaneous injection 5,000 Units, Q12H KEMAL    hydrOXYzine HCL (ATARAX) tablet 25 mg, Q6H PRN    insulin lispro (HumALOG/ADMELOG) 100 units/mL subcutaneous injection 1-6 Units, TID AC **AND** Fingerstick Glucose (POCT), TID AC    insulin lispro (HumALOG/ADMELOG) 100 units/mL subcutaneous injection 1-6 Units, HS    melatonin tablet 3 mg, HS PRN    NIFEdipine (PROCARDIA XL) 24 hr tablet 60 mg, After Dinner    oxyCODONE (ROXICODONE) IR tablet 5 mg, Q8H PRN    oxyCODONE (ROXICODONE) split tablet 2.5 mg, Q8H PRN    polyethylene glycol (MIRALAX) packet 17 g, Daily PRN    sevelamer (RENAGEL) tablet 800 mg, TID With Meals    Administrative Statements   Today, Patient Was Seen By: Jacek Conteh DO      **Please Note: This note may have been constructed using a voice recognition system.**

## 2024-11-27 NOTE — ASSESSMENT & PLAN NOTE
Dialysis unit/days: [facility]  Access: PermCath  Patient is TTS  Next dialysis today due to holiday schedule   UF on HD   kg  Renal Diet  Fluid restriction 1-1.5L/d  Adjust medications to GFR<10  Stable for discharge from my end

## 2024-11-27 NOTE — RESPIRATORY THERAPY NOTE
Home Oxygen Qualifying Test     Patient name: Naresh Carpio        : 1960   Date of Test:  2024  Diagnosis:    Home Oxygen Test:    **Medicare Guidelines require item(s) 1-5 on all ambulatory patients or 1 and 2 on non-ambulatory patients.    1. Baseline SPO2 on Room Air at rest 87 %   If <= 88% on Room Air add O2 via NC to obtain SpO2 >=88%. If LPM needed, document LPM 1 needed to reach =>88%    SPO2 during exertion on Room Air 87  %  During exertion monitor SPO2. If SPO2 increases >=89%, do not add supplemental oxygen    SPO2 on Oxygen at Rest 93 % at 1 LPM    SPO2 during exertion on Oxygen 91 % at 1 LPM    Test performed during exertion activity.      [x]  Supplemental Home Oxygen is indicated.    []  Client does not qualify for home oxygen.    Respiratory Additional Notes-     Daja Lockett, RT

## 2024-11-27 NOTE — ASSESSMENT & PLAN NOTE
Volume: Hypervolemia  Blood pressure: Normotensive, /65  UF on HD   Continue nifedipine 60 mg daily, avoid BP meds before dialysis

## 2024-11-27 NOTE — PROGRESS NOTES
Progress Note - Nephrology   Name: Naresh Carpio 64 y.o. male I MRN: [id number]  Unit/Bed#: 4 [address] I Date of Admission: 11/19/2024   Date of Service: 11/27/2024 I Hospital Day: 8     Assessment & Plan  ESRD (end stage renal disease) (HCC)  Dialysis unit/days: [facility]  Access: PermCath  Patient is TTS  Next dialysis today due to holiday schedule   UF on HD   kg  Renal Diet  Fluid restriction 1-1.5L/d  Adjust medications to GFR<10  Stable for discharge from my end      Anemia due to chronic kidney disease, on chronic dialysis (HCC)    Current hemoglobin: 8 mg/dL  Etiology secondary to  ESRD  Treatment:  Continue Epogen 3000 units on HD  Transfuse for hemoglobin less than 7.0 per primary service  Hypertension  Volume: Hypervolemia  Blood pressure: Normotensive, /65  UF on HD   Continue nifedipine 60 mg daily, avoid BP meds before dialysis   Chronic kidney disease-mineral and bone disorder  Continue sevelamer 800 mg 3 times daily with meals  Type II diabetes mellitus (HCC)  HbA1c 5.5  Advised to maintain a good DM control  Maintain healthy diet (vegetables, fruits, whole grains, nonfat or low fat)  Weight loss  Physical activity (5 to 10 minutes to start the increase to 30 min a day)    Acute on chronic diastolic (congestive) heart failure (HCC)  UF on HD  Low-sodium diet  Chronic wound  Management as per primary team     I have reviewed the nephrology recommendations including HD today, with primary team, and we are in agreement with renal plan including the information outlined above. Ok for discharge from Nephrology service perspective.    Subjective   Brief History of Admission - 65 yo man with PMH of ESRD on HD, hypertension, diabetes, chronic lower extremity wounds p/w shortness of breath. Nephrology is consulted for management of ESRD     Patient feels tired, mild shortness of breath.  No chest pain    Objective :  Temp:  [97.7 °F (36.5 °C)-98 °F (36.7 °C)] 97.7 °F (36.5 °C)  HR:  [66-71]  66  BP: (132-137)/(51-65) 137/65  Resp:  [18-19] 19  SpO2:  [83 %-95 %] 95 %  O2 Device: Mid flow nasal cannula  Nasal Cannula O2 Flow Rate (L/min):  [1 L/min-2 L/min] 1 L/min    Current Weight: Weight - Scale: 106 kg (234 lb)  First Weight: Weight - Scale: 106 kg (234 lb)  I/O         11/25 0701 11/26 0700 11/26 0701 11/27 0700 11/27 0701 11/28 0700    P.O. 440      I.V. (mL/kg) 500 (4.7)      Total Intake(mL/kg) 940 (8.9)      Urine (mL/kg/hr) 250 (0.1) 275 (0.1)     Other 3500      Total Output 3750 275     Net -2810 -275                  Physical Exam  General:  no acute distress at this time  Skin:  No acute rash  Eyes:  No scleral icterus and noninjected  ENT:  mucous membranes moist  Neck:  no carotid bruits  Chest:  Clear to auscultation percussion, good respiratory effort, no use of accessory respiratory muscles  CVS:  Regular rate and rhythm without rub   Abdomen:  soft and nontender   Extremities: Lower extremity wound  Neuro:  No gross focality  Psych:  Alert , cooperative   Dialysis access: PermCath    Medications:    Current Facility-Administered Medications:     acetaminophen (TYLENOL) tablet 650 mg, 650 mg, Oral, Q6H PRN, Milan Ruiz MD    allopurinol (ZYLOPRIM) tablet 100 mg, 100 mg, Oral, Daily, Milan Ruiz MD, 100 mg at 11/26/24 0827    aspirin chewable tablet 81 mg, 81 mg, Oral, Daily, Milan Ruiz MD, 81 mg at 11/26/24 0827    atorvastatin (LIPITOR) tablet 80 mg, 80 mg, Oral, Daily, Milan Ruiz MD, 80 mg at 11/26/24 0827    benzonatate (TESSALON PERLES) capsule 100 mg, 100 mg, Oral, TID PRN, GRANT Church    calcium carbonate (TUMS) chewable tablet 750 mg, 750 mg, Oral, BID PRN, GRANT Rich, 750 mg at 11/24/24 2003    epoetin jessica (EPOGEN,PROCRIT) injection 3,000 Units, 3,000 Units, Intravenous, After Dialysis, Joselyn Reyes Bahamonde, MD, 3,000 Units at 11/25/24 1227    gabapentin (NEURONTIN) capsule 200 mg, 200 mg, Oral, BID, Milan  Reinaldo Ruiz MD, 200 mg at 11/26/24 1710    heparin (porcine) subcutaneous injection 5,000 Units, 5,000 Units, Subcutaneous, Q12H KEMAL, Milan Ruiz MD, 5,000 Units at 11/26/24 2109    hydrOXYzine HCL (ATARAX) tablet 25 mg, 25 mg, Oral, Q6H PRN, GRANT Church, 25 mg at 11/23/24 0450    insulin lispro (HumALOG/ADMELOG) 100 units/mL subcutaneous injection 1-6 Units, 1-6 Units, Subcutaneous, TID AC **AND** Fingerstick Glucose (POCT), , , TID AC, Milan Ruiz MD    insulin lispro (HumALOG/ADMELOG) 100 units/mL subcutaneous injection 1-6 Units, 1-6 Units, Subcutaneous, HS, Milan Ruiz MD    melatonin tablet 3 mg, 3 mg, Oral, HS PRN, GRANT Church, 3 mg at 11/22/24 2223    NIFEdipine (PROCARDIA XL) 24 hr tablet 60 mg, 60 mg, Oral, After Dinner, Milan Ruiz MD, 60 mg at 11/26/24 1710    oxyCODONE (ROXICODONE) IR tablet 5 mg, 5 mg, Oral, Q8H PRN, Milan Ruiz MD, 5 mg at 11/26/24 2109    oxyCODONE (ROXICODONE) split tablet 2.5 mg, 2.5 mg, Oral, Q8H PRN, Milan Ruiz MD, 2.5 mg at 11/25/24 2317    polyethylene glycol (MIRALAX) packet 17 g, 17 g, Oral, Daily PRN, Milan Ruiz MD    sevelamer (RENAGEL) tablet 800 mg, 800 mg, Oral, TID With Meals, German Linton MD, 800 mg at 11/26/24 1610      Lab Results: I have reviewed the following results:  Results from last 7 days   Lab Units 11/26/24  0514 11/25/24  0836 11/24/24  0502 11/23/24  0454 11/22/24  0350 11/21/24  0600 11/20/24  0847   WBC Thousand/uL 6.30 6.62 6.18 6.04 6.80 6.32 7.61   HEMOGLOBIN g/dL 8.0* 8.3* 8.0* 8.2* 8.6* 7.9* 7.6*   HEMATOCRIT % 24.4* 25.8* 24.6* 25.8* 27.2* 25.1* 23.8*   PLATELETS Thousands/uL 205 247 235 245 308 242 252   POTASSIUM mmol/L 4.5 4.7 4.3 4.7 4.3 4.9 4.4   CHLORIDE mmol/L 96 91* 93* 94* 91* 95* 94*   CO2 mmol/L 27 28 27 27 28 26 29   BUN mg/dL 30* 48* 32* 50* 37* 59* 48*   CREATININE mg/dL 4.48* 6.47* 4.75* 6.61* 5.04* 7.12* 6.18*   CALCIUM mg/dL 8.8 8.7  "8.0* 8.0* 8.4 8.4 8.3*   MAGNESIUM mg/dL  --   --  2.1  --  2.0 2.1 2.0   PHOSPHORUS mg/dL  --   --   --   --   --  5.1*  --    ALBUMIN g/dL  --   --   --  3.7  --  3.6  --        Administrative Statements     Portions of the record may have been created with voice recognition software. Occasional wrong word or \"sound a like\" substitutions may have occurred due to the inherent limitations of voice recognition software. Read the chart carefully and recognize, using context, where substitutions have occurred.If you have any questions, please contact the dictating provider.  "

## 2024-11-27 NOTE — CASE MANAGEMENT
Case Management Discharge Planning Note    Patient name Naresh Carpio  Location [address] [place] [address]-* MRN [id number]  : 1960 Date 2024       Current Admission Date: 2024  Current Admission Diagnosis:Acute hypoxic respiratory failure (HCC)   Patient Active Problem List    Diagnosis Date Noted Date Diagnosed    Chronic wound 2024     Acute hypoxic respiratory failure ([facility]) 2024     Acute on chronic diastolic (congestive) heart failure ([facility]) 2024     Pulmonary edema 2024     Pleural effusion 2024     Elevated troponin 2024     Hyperphosphatemia 2024     Fall 2024     Melena 2024     Paroxysmal atrial fibrillation ([facility])      ESRD (end stage renal disease) ([facility]) 10/25/2024     Nausea 10/20/2024     Hyponatremia 10/19/2024     Chronic kidney disease-mineral and bone disorder 10/14/2024     Falls 10/08/2024     PAD (peripheral artery disease) ([facility]) 10/08/2024     Closed fracture of right pelvis ([facility]) 2024     Bilateral sacral fracture, closed ([facility]) 2024     Hyperkalemia 2024     Difficulty with speech 2024     History of amputation of hallux ([facility]) 2024     Hypertensive urgency 2024     Facial cellulitis 2024     Constipation 2023     Bradycardia 2023     Cellulitis of right foot      Polymicrobial bacterial infection 2023     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 mellitus, with necrosis of bone ([facility])      Acquired deformity of foot, right      Charcot's joint      Subacute osteomyelitis of right foot ([facility])      Open wound of right foot 2023     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 mellitus, with bone involvement without evidence of necrosis ([facility])      Diabetic ulcer of left midfoot associated with type 2 diabetes mellitus, limited to breakdown of skin ([facility])      Diabetic polyneuropathy associated with type 2 diabetes mellitus  (HCC)      Diabetes mellitus type 2 with peripheral artery disease (HCC)      History of amputation of lesser toe of left foot (HCC)      Onychomycosis      Status post fall 08/19/2023     Traumatic rhabdomyolysis (HCC) 08/19/2023     Leukocytosis 08/19/2023     Osteoarthritis of left hip 07/27/2022     Severe Left Orchalgia 07/26/2022     Right shoulder pain 07/26/2022     Left hip pain 07/06/2022     Hemodialysis status (HCC)      Hypervolemia      Anemia due to chronic kidney disease, on chronic dialysis (HCC) 01/21/2022     Type II diabetes mellitus (HCC)      Hypertension      History of TIA (transient ischemic attack)      T10 vertebral fracture (HCC)        LOS (days): 8  Geometric Mean LOS (GMLOS) (days): 4.4  Days to GMLOS:-3.7     OBJECTIVE:  Risk of Unplanned Readmission Score: 35.38      Current admission status: Inpatient   Preferred Pharmacy:   [id number] [address] [place] [address]  [address] [address]  Phone: [phone] Fax: [phone]    Primary Care Provider: Jeffrey Jackson    Primary Insurance: MEDICARE  Secondary Insurance:     DISCHARGE DETAILS:    Discharge planning discussed with:: Patient  Freedom of Choice: Yes  Comments - Freedom of Choice: SW met bedside with patient to discuss qualification for home oxygen per RT. Patient adamently refusing SW order home oxygen at this time. Patient states choice is to discharge home and requests SW assist with transportation.     Were Treatment Team discharge recommendations reviewed with patient/caregiver?: Yes  Did patient/caregiver verbalize understanding of patient care needs?: Yes  Were patient/caregiver advised of the risks associated with not following Treatment Team discharge recommendations?: Yes    Other Referral/Resources/Interventions Provided:  Interventions: Transportation  Referral Comments: Attending and unit nurse notifed patient is refusing home oxygen order. [facility] scheduled for 1730  transport home.     Discharge Destination Plan:: Home  Transport at Discharge : Wheelchair van     Number/Name of Dispatcher: [facility]  Transported by (Company and Unit #): [employer]  ETA of Transport (Date): 11/27/24  ETA of Transport (Time): 1730    Update: CURTIS notified by Attending that patient will now be discharged tomorrow. Roundtrip transport requested for tomorrow 11:30. Confirmed time still pending.

## 2024-11-27 NOTE — ASSESSMENT & PLAN NOTE
Lab Results   Component Value Date    EGFR 8 11/27/2024    EGFR 12 11/26/2024    EGFR 8 11/25/2024    CREATININE 6.39 (H) 11/27/2024    CREATININE 4.48 (H) 11/26/2024    CREATININE 6.47 (H) 11/25/2024     Continue  Epogen 3000 units on HD

## 2024-11-28 PROBLEM — R05.1 ACUTE COUGH: Status: ACTIVE | Noted: 2024-11-28

## 2024-11-28 LAB
ALBUMIN SERPL BCG-MCNC: 3.6 G/DL (ref 3.5–5)
ALP SERPL-CCNC: 84 U/L (ref 34–104)
ALT SERPL W P-5'-P-CCNC: 9 U/L (ref 7–52)
ANION GAP SERPL CALCULATED.3IONS-SCNC: 7 MMOL/L (ref 4–13)
AST SERPL W P-5'-P-CCNC: 12 U/L (ref 13–39)
BASOPHILS # BLD AUTO: 0.03 THOUSANDS/ΜL (ref 0–0.1)
BASOPHILS NFR BLD AUTO: 1 % (ref 0–1)
BILIRUB SERPL-MCNC: 0.34 MG/DL (ref 0.2–1)
BUN SERPL-MCNC: 32 MG/DL (ref 5–25)
CALCIUM SERPL-MCNC: 8.8 MG/DL (ref 8.4–10.2)
CHLORIDE SERPL-SCNC: 95 MMOL/L (ref 96–108)
CO2 SERPL-SCNC: 26 MMOL/L (ref 21–32)
CREAT SERPL-MCNC: 4.96 MG/DL (ref 0.6–1.3)
EOSINOPHIL # BLD AUTO: 0.52 THOUSAND/ΜL (ref 0–0.61)
EOSINOPHIL NFR BLD AUTO: 9 % (ref 0–6)
ERYTHROCYTE [DISTWIDTH] IN BLOOD BY AUTOMATED COUNT: 14.3 % (ref 11.6–15.1)
GFR SERPL CREATININE-BSD FRML MDRD: 11 ML/MIN/1.73SQ M
GLUCOSE SERPL-MCNC: 106 MG/DL (ref 65–140)
GLUCOSE SERPL-MCNC: 86 MG/DL (ref 65–140)
GLUCOSE SERPL-MCNC: 89 MG/DL (ref 65–140)
HCT VFR BLD AUTO: 24.9 % (ref 36.5–49.3)
HGB BLD-MCNC: 8.1 G/DL (ref 12–17)
IMM GRANULOCYTES # BLD AUTO: 0.01 THOUSAND/UL (ref 0–0.2)
IMM GRANULOCYTES NFR BLD AUTO: 0 % (ref 0–2)
LYMPHOCYTES # BLD AUTO: 1.53 THOUSANDS/ΜL (ref 0.6–4.47)
LYMPHOCYTES NFR BLD AUTO: 26 % (ref 14–44)
MCH RBC QN AUTO: 30.7 PG (ref 26.8–34.3)
MCHC RBC AUTO-ENTMCNC: 32.5 G/DL (ref 31.4–37.4)
MCV RBC AUTO: 94 FL (ref 82–98)
MONOCYTES # BLD AUTO: 0.71 THOUSAND/ΜL (ref 0.17–1.22)
MONOCYTES NFR BLD AUTO: 12 % (ref 4–12)
NEUTROPHILS # BLD AUTO: 3.11 THOUSANDS/ΜL (ref 1.85–7.62)
NEUTS SEG NFR BLD AUTO: 52 % (ref 43–75)
NRBC BLD AUTO-RTO: 0 /100 WBCS
PLATELET # BLD AUTO: 199 THOUSANDS/UL (ref 149–390)
PMV BLD AUTO: 9.8 FL (ref 8.9–12.7)
POTASSIUM SERPL-SCNC: 4.6 MMOL/L (ref 3.5–5.3)
PROT SERPL-MCNC: 6.8 G/DL (ref 6.4–8.4)
RBC # BLD AUTO: 2.64 MILLION/UL (ref 3.88–5.62)
SODIUM SERPL-SCNC: 128 MMOL/L (ref 135–147)
WBC # BLD AUTO: 5.91 THOUSAND/UL (ref 4.31–10.16)

## 2024-11-28 PROCEDURE — 99232 SBSQ HOSP IP/OBS MODERATE 35: CPT

## 2024-11-28 PROCEDURE — 85025 COMPLETE CBC W/AUTO DIFF WBC: CPT | Performed by: STUDENT IN AN ORGANIZED HEALTH CARE EDUCATION/TRAINING PROGRAM

## 2024-11-28 PROCEDURE — 82948 REAGENT STRIP/BLOOD GLUCOSE: CPT

## 2024-11-28 PROCEDURE — 80053 COMPREHEN METABOLIC PANEL: CPT | Performed by: STUDENT IN AN ORGANIZED HEALTH CARE EDUCATION/TRAINING PROGRAM

## 2024-11-28 RX ORDER — METHOCARBAMOL 750 MG/1
750 TABLET, FILM COATED ORAL EVERY 6 HOURS PRN
Qty: 20 TABLET | Refills: 0 | Status: SHIPPED | OUTPATIENT
Start: 2024-11-28 | End: 2024-12-02

## 2024-11-28 RX ORDER — BENZONATATE 100 MG/1
100 CAPSULE ORAL 3 TIMES DAILY PRN
Qty: 20 CAPSULE | Refills: 0 | Status: SHIPPED | OUTPATIENT
Start: 2024-11-28 | End: 2024-12-02

## 2024-11-28 RX ADMIN — GABAPENTIN 200 MG: 100 CAPSULE ORAL at 17:57

## 2024-11-28 RX ADMIN — ATORVASTATIN CALCIUM 80 MG: 80 TABLET, FILM COATED ORAL at 08:41

## 2024-11-28 RX ADMIN — Medication 2.5 MG: at 20:37

## 2024-11-28 RX ADMIN — NIFEDIPINE 60 MG: 30 TABLET, EXTENDED RELEASE ORAL at 17:57

## 2024-11-28 RX ADMIN — SEVELAMER HYDROCHLORIDE 800 MG: 800 TABLET, FILM COATED ORAL at 08:41

## 2024-11-28 RX ADMIN — HEPARIN SODIUM 5000 UNITS: 5000 INJECTION, SOLUTION INTRAVENOUS; SUBCUTANEOUS at 20:40

## 2024-11-28 RX ADMIN — SEVELAMER HYDROCHLORIDE 800 MG: 800 TABLET, FILM COATED ORAL at 12:46

## 2024-11-28 RX ADMIN — OXYCODONE HYDROCHLORIDE 5 MG: 5 TABLET ORAL at 02:07

## 2024-11-28 RX ADMIN — ALLOPURINOL 100 MG: 100 TABLET ORAL at 08:41

## 2024-11-28 RX ADMIN — OXYCODONE HYDROCHLORIDE 5 MG: 5 TABLET ORAL at 17:57

## 2024-11-28 RX ADMIN — GABAPENTIN 200 MG: 100 CAPSULE ORAL at 08:41

## 2024-11-28 RX ADMIN — ASPIRIN 81 MG CHEWABLE TABLET 81 MG: 81 TABLET CHEWABLE at 08:41

## 2024-11-28 RX ADMIN — SEVELAMER HYDROCHLORIDE 800 MG: 800 TABLET, FILM COATED ORAL at 16:50

## 2024-11-28 RX ADMIN — OXYCODONE HYDROCHLORIDE 5 MG: 5 TABLET ORAL at 10:07

## 2024-11-28 RX ADMIN — HEPARIN SODIUM 5000 UNITS: 5000 INJECTION, SOLUTION INTRAVENOUS; SUBCUTANEOUS at 08:41

## 2024-11-28 NOTE — ASSESSMENT & PLAN NOTE
Lab Results   Component Value Date    EGFR 11 11/28/2024    EGFR 8 11/27/2024    EGFR 12 11/26/2024    CREATININE 4.96 (H) 11/28/2024    CREATININE 6.39 (H) 11/27/2024    CREATININE 4.48 (H) 11/26/2024     Continue  Epogen 3000 units on HD

## 2024-11-28 NOTE — ASSESSMENT & PLAN NOTE
Lab Results   Component Value Date    EGFR 11 11/28/2024    EGFR 8 11/27/2024    EGFR 12 11/26/2024    CREATININE 4.96 (H) 11/28/2024    CREATININE 6.39 (H) 11/27/2024    CREATININE 4.48 (H) 11/26/2024   Dialysis days: Tuesday, Thursday, Saturday  Discussed with nephrology, plan for dialysis on Wednesday due to the holiday  Plan for discharge post dialysis, discussed with patient and he is agreeable  Will resume dialysis on Saturday outpatient

## 2024-11-28 NOTE — ASSESSMENT & PLAN NOTE
Continue home medications.   Stable   16, 2021               Content Version: 13.0  © 2006-2021 [employer]. Care instructions adapted under license by [facility] ([facility]). If you have questions about a medical condition or this instruction, always ask your healthcare professional. [address] any warranty or liability for your use of this information. Patient Education        Learning About When to Call Your Doctor During Pregnancy (After 20 Weeks)  Overview  It's common to have concerns about what might be a problem when you're pregnant. Most pregnancies don't have any serious problems. But it's still important to know when to call your doctor if you have certain symptoms or signs of labor. These are general suggestions. Your doctor may give you some more information about when to call. When to call your doctor (after 20 weeks)  Call 911  anytime you think you may need emergency care. For example, call if:  · You have severe vaginal bleeding. · You have sudden, severe pain in your belly. · You passed out (lost consciousness). · You have a seizure. · You see or feel the umbilical cord. · You think you are about to deliver your baby and can't make it safely to the hospital.  Call your doctor now or seek immediate medical care if:  · You have vaginal bleeding. · You have belly pain. · You have a fever. · You have symptoms of preeclampsia, such as:  ? Sudden swelling of your face, hands, or feet. ? New vision problems (such as dimness, blurring, or seeing spots). ? A severe headache. · You have a sudden release of fluid from your vagina. (You think your water broke.)  · You think that you may be in labor. This means that you've had at least 6 contractions in an hour. · You notice that your baby has stopped moving or is moving much less than normal.  · You have symptoms of a urinary tract infection. These may include:  ? Pain or burning when you urinate.   ? A frequent need to urinate without being able to pass much urine. ? Pain in the flank, which is just below the rib cage and above the waist on either side of the back. ? Blood in your urine. Watch closely for changes in your health, and be sure to contact your doctor if:  · You have vaginal discharge that smells bad. · You have skin changes, such as:  ? A rash. ? Itching. ? Yellow color to your skin. · You have other concerns about your pregnancy. If you have labor signs at 37 weeks or more  If you have signs of labor at 37 weeks or more, your doctor may tell you to call when your labor becomes more active. Symptoms of active labor include:  · Contractions that are regular. · Contractions that are less than 5 minutes apart. · Contractions that are hard to talk through. Follow-up care is a key part of your treatment and safety. Be sure to make and go to all appointments, and call your doctor if you are having problems. It's also a good idea to know your test results and keep a list of the medicines you take. Where can you learn more? Go to https://[employer]. org and sign in to your [employer] account. Enter  in the [employer] box to learn more about \"Learning About When to Call Your Doctor During Pregnancy (After 20 Weeks). \"     If you do not have an account, please click on the \"Sign Up Now\" link. Current as of: June 16, 2021               Content Version: 13.0  © 7317-5392 [employer]. Care instructions adapted under license by [facility] ([facility]). If you have questions about a medical condition or this instruction, always ask your healthcare professional. [place] [address] any warranty or liability for your use of this information.

## 2024-11-28 NOTE — ASSESSMENT & PLAN NOTE
Lab Results   Component Value Date    HGBA1C 5.5 10/08/2024       Recent Labs     11/27/24  1159 11/27/24  1615 11/27/24  2109 11/28/24  0725   POCGLU 111 128 105 86       Blood Sugar Average: Last 72 hrs:  (P) 113.1995715112950771  Patient will be placed on an insulin sliding scale.  Will continue to monitor blood sugar levels and make further adjustments as needed.

## 2024-11-28 NOTE — ASSESSMENT & PLAN NOTE
Patient was seen and examined in the emergency department and will be admitted to the hospital for further evaluation and management.  Patient presenting with shortness of breath found to have evidence of acute hypoxic respiratory failure. Chest x-ray showing evidence of pulmonary edema with bilateral pleural effusion secondary to volume overload from acute on chronic diastolic congestive heart failure brought on by the missed dialysis session this weekend.  Fluid management with hemodialysis per nephrology.  Continue supplemental oxygen therapy as needed and wean as tolerated.    Currently on room air.    Discussed with nephrology today, plan for dialysis again on 11/27, okay for discharge post hemodialysis.  Discussed with patient and he is agreeable at this time and will follow-up with his outpatient dialysis  11/28: Patient previously scheduled for discharge today could not be transported due to: Unavailability of transportation  CM recs: the trip is in the community - we may not be able to get this done today as [employer] is our only community partner available for  in [place].

## 2024-11-28 NOTE — TREATMENT PLAN
Patient had dialysis yesterday, UF 3.4 L.  Next dialysis Saturday      Joselyn Reyes Bahamonde, MD  Nephrology Attending

## 2024-11-28 NOTE — ASSESSMENT & PLAN NOTE
Lab Results   Component Value Date    EGFR 11 11/28/2024    EGFR 8 11/27/2024    EGFR 12 11/26/2024    CREATININE 4.96 (H) 11/28/2024    CREATININE 6.39 (H) 11/27/2024    CREATININE 4.48 (H) 11/26/2024   As noted above, patient with a missed dialysis session this weekend.    Continue hemodialysis support per recommendations from nephrology.  Plan for dialysis on 11/27 with discharge post dialysis, discussed with patient and he is agreeable and will continue to follow with outpatient dialysis

## 2024-11-28 NOTE — PROGRESS NOTES
Progress Note - Hospitalist   Name: Naresh Carpio 64 y.o. male I MRN: [id number]  Unit/Bed#: [address] [place] [address] I Date of Admission: 11/19/2024   Date of Service: 11/28/2024 I Hospital Day: 9    Assessment & Plan  Acute hypoxic respiratory failure (HCC)  Patient was seen and examined in the emergency department and will be admitted to the hospital for further evaluation and management.  Patient presenting with shortness of breath found to have evidence of acute hypoxic respiratory failure. Chest x-ray showing evidence of pulmonary edema with bilateral pleural effusion secondary to volume overload from acute on chronic diastolic congestive heart failure brought on by the missed dialysis session this weekend.  Fluid management with hemodialysis per nephrology.  Continue supplemental oxygen therapy as needed and wean as tolerated.    Currently on room air.    Discussed with nephrology today, plan for dialysis again on 11/27, okay for discharge post hemodialysis.  Discussed with patient and he is agreeable at this time and will follow-up with his outpatient dialysis  11/28: Patient previously scheduled for discharge today could not be transported due to: Unavailability of transportation  CM recs: the trip is in the community - we may not be able to get this done today as [facility] available for  in [place].        Anemia due to chronic kidney disease, on chronic dialysis (HCC)  Lab Results   Component Value Date    EGFR 11 11/28/2024    EGFR 8 11/27/2024    EGFR 12 11/26/2024    CREATININE 4.96 (H) 11/28/2024    CREATININE 6.39 (H) 11/27/2024    CREATININE 4.48 (H) 11/26/2024     Continue  Epogen 3000 units on HD  Type II diabetes mellitus (HCC)  Lab Results   Component Value Date    HGBA1C 5.5 10/08/2024       Recent Labs     11/27/24  1159 11/27/24  1615 11/27/24  2109 11/28/24  0725   POCGLU 111 128 105 86       Blood Sugar Average: Last 72 hrs:  (P) 113.3621536117771851  Patient will be placed  on an insulin sliding scale.  Will continue to monitor blood sugar levels and make further adjustments as needed.  Hypertension  Continue home medications.   Stable  Chronic kidney disease-mineral and bone disorder  Lab Results   Component Value Date    EGFR 11 11/28/2024    EGFR 8 11/27/2024    EGFR 12 11/26/2024    CREATININE 4.96 (H) 11/28/2024    CREATININE 6.39 (H) 11/27/2024    CREATININE 4.48 (H) 11/26/2024   Dialysis days: Tuesday, Thursday, Saturday  Discussed with nephrology, plan for dialysis on Wednesday due to the holiday  Plan for discharge post dialysis, discussed with patient and he is agreeable  Will resume dialysis on Saturday outpatient  ESRD (end stage renal disease) ([facility])  Lab Results   Component Value Date    EGFR 11 11/28/2024    EGFR 8 11/27/2024    EGFR 12 11/26/2024    CREATININE 4.96 (H) 11/28/2024    CREATININE 6.39 (H) 11/27/2024    CREATININE 4.48 (H) 11/26/2024   As noted above, patient with a missed dialysis session this weekend.    Continue hemodialysis support per recommendations from nephrology.  Plan for dialysis on 11/27 with discharge post dialysis, discussed with patient and he is agreeable and will continue to follow with outpatient dialysis  Paroxysmal atrial fibrillation (HCC)  Continue aspirin 81 mg  EKG shows sinus rhythm 63  Acute on chronic diastolic (congestive) heart failure (HCC)  Wt Readings from Last 3 Encounters:   11/19/24 106 kg (234 lb)   11/07/24 106 kg (233 lb 14.5 oz)   10/22/24 97.7 kg (215 lb 6.2 oz)     Chest x-ray showing evidence of pulmonary edema with bilateral pleural effusions secondary to volume overload from acute on chronic diastolic congestive heart failure brought on by his missed dialysis session this weekend.    Fluid management with hemodialysis per nephrology.  Improving  Plan for dialysis 11/27 with discharge post dialysis  Patient has transportation set up for 11:00 on 11/28/2024  Pulmonary edema  Chest x-ray showing evidence of pulmonary  edema with bilateral pleural effusion secondary to volume overload from acute on chronic diastolic congestive heart failure brought on by the missed dialysis session this weekend.    Fluid management with hemodialysis per nephrology.   Patient appears euvolemic at this time  Weaned off O2  Pleural effusion  Chest x-ray showing evidence of pulmonary edema with bilateral pleural effusion secondary to volume overload from acute on chronic diastolic congestive heart failure brought on by the missed dialysis session this weekend.    Fluid management with hemodialysis per nephrology.   Plan as above  Elevated troponin  Patient with elevated troponin levels likely secondary to demand ischemia from acute on chronic diastolic congestive heart failure from end-stage renal disease after a missed dialysis session.    Fluid management with hemodialysis per nephrology.  Denies chest pain  Chronic wound  Patient with evidence of bilateral venous stasis ulcerations and history of right partial first ray and left fifth digit amputations.  Patient was evaluated by podiatry with plans for local wound care.    Arterial doppler revealed new bilateral popliteal stenosis and increase in stenosis of the right tibio-peroneal trunk  Vascular surgery consult, appreciate input  Patient declined any surgical intervention this admission  Plan for close follow-up outpatient with vascular  Hyponatremia  Secondary to volume overload related to end-stage renal disease. Sodium 130 today.  Nephrology following   PAD (peripheral artery disease) ([facility])  LEAD 11/22  Right  50-75% stenosis proximal popliteal artery. >75 stenosis proximal tibio-peroneal trunk  SALVATORE:1.7, MTP: non-compressible. GTP 78 mmHg  Left  Greater than 75% stenosis proximal popliteal artery  SALVAOTRE: 1.7, MTP non compressible, GTP 50 mmHg.  Continue ASA and statin  Patient refusing intervention at this time and would like to follow up as an outpatient with vascular surgery  Local wound care  per podiatry.        Acute cough      VTE Pharmacologic Prophylaxis:   Moderate Risk (Score 3-4) - Pharmacological DVT Prophylaxis Ordered: heparin.    Mobility:   Basic Mobility Inpatient Raw Score: 24  JH-HLM Goal: 8: Walk 250 feet or more  JH-HLM Achieved: 8: Walk 250 feet ot more  JH-HLM Goal achieved. Continue to encourage appropriate mobility.    Patient Centered Rounds: I performed bedside rounds with nursing staff today.   Discussions with Specialists or Other Care Team Provider: Yes    Education and Discussions with Family / Patient: Patient declined call to .     Current Length of Stay: 9 day(s)  Current Patient Status: Inpatient   Certification Statement: The patient will continue to require additional inpatient hospital stay due to unavailable transportation  Discharge Plan: Anticipate discharge tomorrow to home.    Code Status: Level 1 - Full Code    Subjective   Seen sitting up on side of bed.  O2 saturation on room air 90 - 92%.  Patient reports feeling much better with improvement in breathing.  Denies any acute event.    Objective :  Temp:  [98 °F (36.7 °C)-98.3 °F (36.8 °C)] 98.1 °F (36.7 °C)  HR:  [63-75] 75  BP: (121-158)/(43-63) 158/63  Resp:  [16-18] 16  SpO2:  [87 %-93 %] 87 %  O2 Device: Mid flow nasal cannula    Body mass index is 31.74 kg/m².     Input and Output Summary (last 24 hours):     Intake/Output Summary (Last 24 hours) at 11/28/2024 1447  Last data filed at 11/27/2024 1945  Gross per 24 hour   Intake --   Output 100 ml   Net -100 ml       Physical Exam  Vitals and nursing note reviewed.   Constitutional:       General: He is not in acute distress.     Appearance: He is well-developed.   HENT:      Head: Normocephalic and atraumatic.   Eyes:      Conjunctiva/sclera: Conjunctivae normal.   Cardiovascular:      Rate and Rhythm: Normal rate and regular rhythm.      Heart sounds: No murmur heard.  Pulmonary:      Effort: Pulmonary effort is normal. No respiratory  distress.      Breath sounds: Normal breath sounds.   Abdominal:      Palpations: Abdomen is soft.      Tenderness: There is no abdominal tenderness.   Musculoskeletal:         General: No swelling.      Cervical back: Neck supple.   Skin:     General: Skin is warm and dry.      Capillary Refill: Capillary refill takes less than 2 seconds.   Neurological:      Mental Status: He is alert and oriented to person, place, and time.   Psychiatric:         Mood and Affect: Mood normal.           Lines/Drains:  Lines/Drains/Airways       Active Status       Name Placement date Placement time Site Days    HD Permanent Double Catheter --  --  Internal jugular  --                            Lab Results: I have reviewed the following results:   Results from last 7 days   Lab Units 11/28/24  0606   WBC Thousand/uL 5.91   HEMOGLOBIN g/dL 8.1*   HEMATOCRIT % 24.9*   PLATELETS Thousands/uL 199   SEGS PCT % 52   LYMPHO PCT % 26   MONO PCT % 12   EOS PCT % 9*     Results from last 7 days   Lab Units 11/28/24  0606   SODIUM mmol/L 128*   POTASSIUM mmol/L 4.6   CHLORIDE mmol/L 95*   CO2 mmol/L 26   BUN mg/dL 32*   CREATININE mg/dL 4.96*   ANION GAP mmol/L 7   CALCIUM mg/dL 8.8   ALBUMIN g/dL 3.6   TOTAL BILIRUBIN mg/dL 0.34   ALK PHOS U/L 84   ALT U/L 9   AST U/L 12*   GLUCOSE RANDOM mg/dL 89         Results from last 7 days   Lab Units 11/28/24  0725 11/27/24  2109 11/27/24  1615 11/27/24  1159 11/27/24  0724 11/26/24 2019 11/26/24  1552 11/26/24  1058 11/26/24  0709 11/25/24 2008 11/25/24  1558 11/25/24  1142   POC GLUCOSE mg/dl 86 105 128 111 106 139 121 120 93 110 123 131               Recent Cultures (last 7 days):               Last 24 Hours Medication List:     Current Facility-Administered Medications:     acetaminophen (TYLENOL) tablet 650 mg, Q6H PRN    allopurinol (ZYLOPRIM) tablet 100 mg, Daily    aspirin chewable tablet 81 mg, Daily    atorvastatin (LIPITOR) tablet 80 mg, Daily    benzonatate (TESSALON PERLES) capsule  100 mg, TID PRN    calcium carbonate (TUMS) chewable tablet 750 mg, BID PRN    epoetin jessica (EPOGEN,PROCRIT) injection 3,000 Units, After Dialysis    gabapentin (NEURONTIN) capsule 200 mg, BID    heparin (porcine) subcutaneous injection 5,000 Units, Q12H KEMAL    hydrOXYzine HCL (ATARAX) tablet 25 mg, Q6H PRN    insulin lispro (HumALOG/ADMELOG) 100 units/mL subcutaneous injection 1-6 Units, TID AC **AND** Fingerstick Glucose (POCT), TID AC    insulin lispro (HumALOG/ADMELOG) 100 units/mL subcutaneous injection 1-6 Units, HS    melatonin tablet 3 mg, HS PRN    NIFEdipine (PROCARDIA XL) 24 hr tablet 60 mg, After Dinner    oxyCODONE (ROXICODONE) IR tablet 5 mg, Q8H PRN    oxyCODONE (ROXICODONE) split tablet 2.5 mg, Q8H PRN    polyethylene glycol (MIRALAX) packet 17 g, Daily PRN    sevelamer (RENAGEL) tablet 800 mg, TID With Meals    Administrative Statements   Today, Patient Was Seen By: GRANT Perales      **Please Note: This note may have been constructed using a voice recognition system.**

## 2024-11-28 NOTE — ASSESSMENT & PLAN NOTE
Lab Results   Component Value Date    HGBA1C 5.5 10/08/2024       Recent Labs     11/27/24  1159 11/27/24  1615 11/27/24  2109 11/28/24  0725   POCGLU 111 128 105 86       Blood Sugar Average: Last 72 hrs:  (P) 113.7212293900362492      HbA1c 5.5  Advised to maintain a good DM control  Maintain healthy diet (vegetables, fruits, whole grains, nonfat or low fat)  Weight loss  Physical activity (5 to 10 minutes to start the increase to 30 min a day)

## 2024-11-29 LAB
ANION GAP SERPL CALCULATED.3IONS-SCNC: 10 MMOL/L (ref 4–13)
ATRIAL RATE: 53 BPM
BUN SERPL-MCNC: 51 MG/DL (ref 5–25)
CALCIUM SERPL-MCNC: 9.2 MG/DL (ref 8.4–10.2)
CARDIAC TROPONIN I PNL SERPL HS: 22 NG/L (ref 8–18)
CHLORIDE SERPL-SCNC: 92 MMOL/L (ref 96–108)
CO2 SERPL-SCNC: 25 MMOL/L (ref 21–32)
CREAT SERPL-MCNC: 7.11 MG/DL (ref 0.6–1.3)
ERYTHROCYTE [DISTWIDTH] IN BLOOD BY AUTOMATED COUNT: 14.5 % (ref 11.6–15.1)
GFR SERPL CREATININE-BSD FRML MDRD: 7 ML/MIN/1.73SQ M
GLUCOSE SERPL-MCNC: 100 MG/DL (ref 65–140)
GLUCOSE SERPL-MCNC: 103 MG/DL (ref 65–140)
GLUCOSE SERPL-MCNC: 104 MG/DL (ref 65–140)
GLUCOSE SERPL-MCNC: 106 MG/DL (ref 65–140)
GLUCOSE SERPL-MCNC: 110 MG/DL (ref 65–140)
HCT VFR BLD AUTO: 26.1 % (ref 36.5–49.3)
HGB BLD-MCNC: 8.4 G/DL (ref 12–17)
MCH RBC QN AUTO: 30.2 PG (ref 26.8–34.3)
MCHC RBC AUTO-ENTMCNC: 32.2 G/DL (ref 31.4–37.4)
MCV RBC AUTO: 94 FL (ref 82–98)
P AXIS: 18 DEGREES
PLATELET # BLD AUTO: 225 THOUSANDS/UL (ref 149–390)
PMV BLD AUTO: 10.3 FL (ref 8.9–12.7)
POTASSIUM SERPL-SCNC: 5.2 MMOL/L (ref 3.5–5.3)
PR INTERVAL: 204 MS
QRS AXIS: -4 DEGREES
QRSD INTERVAL: 94 MS
QT INTERVAL: 510 MS
QTC INTERVAL: 479 MS
RBC # BLD AUTO: 2.78 MILLION/UL (ref 3.88–5.62)
SODIUM SERPL-SCNC: 127 MMOL/L (ref 135–147)
T WAVE AXIS: 45 DEGREES
VENTRICULAR RATE: 53 BPM
WBC # BLD AUTO: 7.44 THOUSAND/UL (ref 4.31–10.16)

## 2024-11-29 PROCEDURE — 82948 REAGENT STRIP/BLOOD GLUCOSE: CPT

## 2024-11-29 PROCEDURE — 84484 ASSAY OF TROPONIN QUANT: CPT | Performed by: NURSE PRACTITIONER

## 2024-11-29 PROCEDURE — 93010 ELECTROCARDIOGRAM REPORT: CPT | Performed by: INTERNAL MEDICINE

## 2024-11-29 PROCEDURE — 99232 SBSQ HOSP IP/OBS MODERATE 35: CPT | Performed by: STUDENT IN AN ORGANIZED HEALTH CARE EDUCATION/TRAINING PROGRAM

## 2024-11-29 PROCEDURE — 99232 SBSQ HOSP IP/OBS MODERATE 35: CPT | Performed by: NURSE PRACTITIONER

## 2024-11-29 PROCEDURE — 80048 BASIC METABOLIC PNL TOTAL CA: CPT | Performed by: NURSE PRACTITIONER

## 2024-11-29 PROCEDURE — 85027 COMPLETE CBC AUTOMATED: CPT | Performed by: NURSE PRACTITIONER

## 2024-11-29 PROCEDURE — 93005 ELECTROCARDIOGRAM TRACING: CPT

## 2024-11-29 RX ORDER — ALBUMIN (HUMAN) 12.5 G/50ML
12.5 SOLUTION INTRAVENOUS ONCE
Status: COMPLETED | OUTPATIENT
Start: 2024-11-29 | End: 2024-11-29

## 2024-11-29 RX ADMIN — ATORVASTATIN CALCIUM 80 MG: 80 TABLET, FILM COATED ORAL at 09:04

## 2024-11-29 RX ADMIN — GABAPENTIN 200 MG: 100 CAPSULE ORAL at 18:00

## 2024-11-29 RX ADMIN — HEPARIN SODIUM 5000 UNITS: 5000 INJECTION, SOLUTION INTRAVENOUS; SUBCUTANEOUS at 09:05

## 2024-11-29 RX ADMIN — SEVELAMER HYDROCHLORIDE 800 MG: 800 TABLET, FILM COATED ORAL at 12:29

## 2024-11-29 RX ADMIN — HEPARIN SODIUM 5000 UNITS: 5000 INJECTION, SOLUTION INTRAVENOUS; SUBCUTANEOUS at 21:38

## 2024-11-29 RX ADMIN — ALLOPURINOL 100 MG: 100 TABLET ORAL at 09:04

## 2024-11-29 RX ADMIN — ASPIRIN 81 MG CHEWABLE TABLET 81 MG: 81 TABLET CHEWABLE at 09:04

## 2024-11-29 RX ADMIN — GABAPENTIN 200 MG: 100 CAPSULE ORAL at 09:04

## 2024-11-29 RX ADMIN — OXYCODONE HYDROCHLORIDE 5 MG: 5 TABLET ORAL at 07:36

## 2024-11-29 RX ADMIN — SEVELAMER HYDROCHLORIDE 800 MG: 800 TABLET, FILM COATED ORAL at 16:54

## 2024-11-29 RX ADMIN — OXYCODONE HYDROCHLORIDE 5 MG: 5 TABLET ORAL at 16:54

## 2024-11-29 RX ADMIN — SEVELAMER HYDROCHLORIDE 800 MG: 800 TABLET, FILM COATED ORAL at 09:04

## 2024-11-29 RX ADMIN — ALBUMIN (HUMAN) 12.5 G: 0.25 INJECTION, SOLUTION INTRAVENOUS at 11:32

## 2024-11-29 NOTE — ASSESSMENT & PLAN NOTE
Dialysis unit/days: [facility]  Access: PermCath  Patient is TTS  Plan for dialysis tomorrow as per schedule if remains inpatient otherwise patient can continue outpatient dialysis   kg  Renal Diet  Fluid restriction 1-1.5L/d  Adjust medications to GFR<10  Patient is a stable for discharge from my end

## 2024-11-29 NOTE — ASSESSMENT & PLAN NOTE
Continue aspirin 81 mg  EKG shows sinus bradycardia 53 first-degree AV block  Monitor on telemetry.  Will hold nifedipine

## 2024-11-29 NOTE — ASSESSMENT & PLAN NOTE
Lab Results   Component Value Date    HGBA1C 5.5 10/08/2024       Recent Labs     11/28/24  0725 11/28/24 2049 11/29/24 0727 11/29/24  1208   POCGLU 86 106 103 104       Blood Sugar Average: Last 72 hrs:  (P) 110.5648540983291124  Patient will be placed on an insulin sliding scale.  Will continue to monitor blood sugar levels and make further adjustments as needed.

## 2024-11-29 NOTE — PLAN OF CARE
Problem: RESPIRATORY - ADULT  Goal: Achieves optimal ventilation and oxygenation  Description: INTERVENTIONS:  - Assess for changes in respiratory status  - Assess for changes in mentation and behavior  - Position to facilitate oxygenation and minimize respiratory effort  - Oxygen administered by appropriate delivery if ordered  - Initiate smoking cessation education as indicated  - Encourage broncho-pulmonary hygiene including cough, deep breathe, Incentive Spirometry  - Assess the need for suctioning and aspirate as needed  - Assess and instruct to report SOB or any respiratory difficulty  - Respiratory Therapy support as indicated  Outcome: Progressing     Problem: GENITOURINARY - ADULT  Goal: Maintains or returns to baseline urinary function  Description: INTERVENTIONS:  - Assess urinary function  - Encourage oral fluids to ensure adequate hydration if ordered  - Administer IV fluids as ordered to ensure adequate hydration  - Administer ordered medications as needed  - Offer frequent toileting  - Follow urinary retention protocol if ordered  Outcome: Progressing  Goal: Absence of urinary retention  Description: INTERVENTIONS:  - Assess patient’s ability to void and empty bladder  - Monitor I/O  - Bladder scan as needed  - Discuss with physician/AP medications to alleviate retention as needed  - Discuss catheterization for long term situations as appropriate  Outcome: Progressing  Goal: Urinary catheter remains patent  Description: INTERVENTIONS:  - Assess patency of urinary catheter  - If patient has a chronic gaitan, consider changing catheter if non-functioning  - Follow guidelines for intermittent irrigation of non-functioning urinary catheter  Outcome: Progressing     Problem: METABOLIC, FLUID AND ELECTROLYTES - ADULT  Goal: Electrolytes maintained within normal limits  Description: INTERVENTIONS:  - Monitor labs and assess patient for signs and symptoms of electrolyte imbalances  - Administer electrolyte  replacement as ordered  - Monitor response to electrolyte replacements, including repeat lab results as appropriate  - Instruct patient on fluid and nutrition as appropriate  Outcome: Progressing  Goal: Fluid balance maintained  Description: INTERVENTIONS:  - Monitor labs   - Monitor I/O and WT  - Instruct patient on fluid and nutrition as appropriate  - Assess for signs & symptoms of volume excess or deficit  Outcome: Progressing     Problem: Prexisting or High Potential for Compromised Skin Integrity  Goal: Skin integrity is maintained or improved  Description: INTERVENTIONS:  - Identify patients at risk for skin breakdown  - Assess and monitor skin integrity  - Assess and monitor nutrition and hydration status  - Monitor labs   - Assess for incontinence   - Turn and reposition patient  - Assist with mobility/ambulation  - Relieve pressure over bony prominences  - Avoid friction and shearing  - Provide appropriate hygiene as needed including keeping skin clean and dry  - Evaluate need for skin moisturizer/barrier cream  - Collaborate with interdisciplinary team   - Patient/family teaching  - Consider wound care consult   Outcome: Progressing     Problem: METABOLIC, FLUID AND ELECTROLYTES - ADULT  Goal: Electrolytes maintained within normal limits  Description: INTERVENTIONS:  - Monitor labs and assess patient for signs and symptoms of electrolyte imbalances  - Administer electrolyte replacement as ordered  - Monitor response to electrolyte replacements, including repeat lab results as appropriate  - Instruct patient on fluid and nutrition as appropriate  Outcome: Progressing  Goal: Fluid balance maintained  Description: INTERVENTIONS:  - Monitor labs   - Monitor I/O and WT  - Instruct patient on fluid and nutrition as appropriate  - Assess for signs & symptoms of volume excess or deficit  Outcome: Progressing     Problem: Prexisting or High Potential for Compromised Skin Integrity  Goal: Skin integrity is  maintained or improved  Description: INTERVENTIONS:  - Identify patients at risk for skin breakdown  - Assess and monitor skin integrity  - Assess and monitor nutrition and hydration status  - Monitor labs   - Assess for incontinence   - Turn and reposition patient  - Assist with mobility/ambulation  - Relieve pressure over bony prominences  - Avoid friction and shearing  - Provide appropriate hygiene as needed including keeping skin clean and dry  - Evaluate need for skin moisturizer/barrier cream  - Collaborate with interdisciplinary team   - Patient/family teaching  - Consider wound care consult   Outcome: Progressing

## 2024-11-29 NOTE — ASSESSMENT & PLAN NOTE
Morning of 11/29 patient was preparing for discharge when he felt lightheaded and dizzy.  Noted to be hypotensive, heart rate 49.  Twelve-lead EKG performed which demonstrated sinus bradycardia rate of 53 with first-degree AV block which is new.  Discussed with cardiology, recommend holding nifedipine and monitor.  Remains on telemetry.  Repeat twelve-lead EKG in AM.  Discussed with nephrology, recommend 1 dose albumin  Monitor

## 2024-11-29 NOTE — PROGRESS NOTES
Progress Note - Hospitalist   Name: Naresh Carpio 64 y.o. male I MRN: [id number]  Unit/Bed#: [address] [place] [address] I Date of Admission: 11/19/2024   Date of Service: 11/29/2024 I Hospital Day: 10    Assessment & Plan  Acute hypoxic respiratory failure (HCC)  Patient was seen and examined in the emergency department and will be admitted to the hospital for further evaluation and management.  Patient presenting with shortness of breath found to have evidence of acute hypoxic respiratory failure. Chest x-ray showing evidence of pulmonary edema with bilateral pleural effusion secondary to volume overload from acute on chronic diastolic congestive heart failure brought on by the missed dialysis session this weekend.  Fluid management with hemodialysis per nephrology.  Continue supplemental oxygen therapy as needed and wean as tolerated.    Currently on room air.    Discussed with nephrology today, plan for dialysis again on 11/27, okay for discharge post hemodialysis.  Discussed with patient and he is agreeable at this time and will follow-up with his outpatient dialysis  11/28: Patient previously scheduled for discharge today could not be transported due to: Unavailability of transportation  CM recs: the trip is in the community - we may not be able to get this done today as [facility] [place] [place] is our only community partner available for  in [place].    Patient was nearing discharge however felt dizzy, lightheaded.  Twelve-lead EKG was performed which demonstrated sinus bradycardia rate of 53 with first-degree AV block which is new for patient.  Placed on telemetry.  Curb sided cardiology, recommend holding nifedipine and monitor  Repeat twelve-lead EKG in a.m.        Anemia due to chronic kidney disease, on chronic dialysis (HCC)  Lab Results   Component Value Date    EGFR 7 11/29/2024    EGFR 11 11/28/2024    EGFR 8 11/27/2024    CREATININE 7.11 (H) 11/29/2024    CREATININE 4.96 (H) 11/28/2024    CREATININE 6.39 (H) 11/27/2024      Continue  Epogen 3000 units on HD  Hemoglobin stable 8.4  Type II diabetes mellitus ([facility])  Lab Results   Component Value Date    HGBA1C 5.5 10/08/2024       Recent Labs     11/28/24  0725 11/28/24 2049 11/29/24  0727 11/29/24  1208   POCGLU 86 106 103 104       Blood Sugar Average: Last 72 hrs:  (P) [id number]  Patient will be placed on an insulin sliding scale.  Will continue to monitor blood sugar levels and make further adjustments as needed.  Hypertension  Hold nifedipine in a.m. secondary to bradycardia/hypotension  Received 1 dose of albumin  Chronic kidney disease-mineral and bone disorder  Lab Results   Component Value Date    EGFR 7 11/29/2024    EGFR 11 11/28/2024    EGFR 8 11/27/2024    CREATININE 7.11 (H) 11/29/2024    CREATININE 4.96 (H) 11/28/2024    CREATININE 6.39 (H) 11/27/2024   Dialysis days: Tuesday, Thursday, Saturday  Nephrology following  ESRD (end stage renal disease) ([facility])  Lab Results   Component Value Date    EGFR 7 11/29/2024    EGFR 11 11/28/2024    EGFR 8 11/27/2024    CREATININE 7.11 (H) 11/29/2024    CREATININE 4.96 (H) 11/28/2024    CREATININE 6.39 (H) 11/27/2024   As noted above, patient with a missed dialysis session this weekend.    Continue hemodialysis support per recommendations from nephrology.  As noted above plan was for patient to be discharged meaning of 11/29, however developed dizziness, low BP and low heart rate  Will monitor overnight, albumin provided, on telemetry.  Hold nifedipine in a.m.  Paroxysmal atrial fibrillation (HCC)  Continue aspirin 81 mg  EKG shows sinus bradycardia 53 first-degree AV block  Monitor on telemetry.  Will hold nifedipine  Acute on chronic diastolic (congestive) heart failure (HCC)  Wt Readings from Last 3 Encounters:   11/19/24 106 kg (234 lb)   11/07/24 106 kg (233 lb 14.5 oz)   10/22/24 97.7 kg (215 lb 6.2 oz)     Chest x-ray showing evidence of pulmonary edema with bilateral pleural effusions secondary to volume overload  from acute on chronic diastolic congestive heart failure brought on by his missed dialysis session this weekend.    Fluid management with hemodialysis per nephrology.  Improving    Pulmonary edema  Chest x-ray showing evidence of pulmonary edema with bilateral pleural effusion secondary to volume overload from acute on chronic diastolic congestive heart failure brought on by the missed dialysis session this weekend.    Fluid management with hemodialysis per nephrology.   Patient appears euvolemic at this time  Weaned off O2  Pleural effusion  Chest x-ray showing evidence of pulmonary edema with bilateral pleural effusion secondary to volume overload from acute on chronic diastolic congestive heart failure brought on by the missed dialysis session this weekend.    Fluid management with hemodialysis per nephrology.   Plan as above  Elevated troponin  Patient with elevated troponin levels likely secondary to demand ischemia from acute on chronic diastolic congestive heart failure from end-stage renal disease after a missed dialysis session.    Fluid management with hemodialysis per nephrology.  Denies chest pain  Chronic wound  Patient with evidence of bilateral venous stasis ulcerations and history of right partial first ray and left fifth digit amputations.  Patient was evaluated by podiatry with plans for local wound care.    Arterial doppler revealed new bilateral popliteal stenosis and increase in stenosis of the right tibio-peroneal trunk  Vascular surgery consult, appreciate input  Patient declined any surgical intervention this admission  Plan for close follow-up outpatient with vascular  Hyponatremia  Secondary to volume overload related to end-stage renal disease. Sodium 127 today.  Nephrology following   Plan for dialysis Saturday  PAD (peripheral artery disease) (HCC)  LEAD 11/22  Right  50-75% stenosis proximal popliteal artery. >75 stenosis proximal tibio-peroneal trunk  SALVATORE:1.7, MTP: non-compressible.  GTP 78 mmHg  Left  Greater than 75% stenosis proximal popliteal artery  SALVATORE: 1.7, MTP non compressible, GTP 50 mmHg.  Continue ASA and statin  Patient refusing intervention at this time and would like to follow up as an outpatient with vascular surgery  Local wound care per podiatry.        Bradycardia  Morning of 11/29 patient was preparing for discharge when he felt lightheaded and dizzy.  Noted to be hypotensive, heart rate 49.  Twelve-lead EKG performed which demonstrated sinus bradycardia rate of 53 with first-degree AV block which is new.  Discussed with cardiology, recommend holding nifedipine and monitor.  Remains on telemetry.  Repeat twelve-lead EKG in AM.  Discussed with nephrology, recommend 1 dose albumin  Monitor    VTE Pharmacologic Prophylaxis:   Moderate Risk (Score 3-4) - Pharmacological DVT Prophylaxis Ordered: heparin.    Mobility:   Basic Mobility Inpatient Raw Score: 24  JH-HLM Goal: 8: Walk 250 feet or more  JH-HLM Achieved: 6: Walk 10 steps or more  JH-HLM Goal NOT achieved. Continue with multidisciplinary rounding and encourage appropriate mobility to improve upon JH-HLM goals.    Patient Centered Rounds: I performed bedside rounds with nursing staff today.   Discussions with Specialists or Other Care Team Provider: multidisciplinary team     Education and Discussions with Family / Patient: Patient declined call to .     Current Length of Stay: 10 day(s)  Current Patient Status: Inpatient   Certification Statement: The patient will continue to require additional inpatient hospital stay due to bradycardia,  light headed, hypotension   Discharge Plan: Anticipate discharge in 24-48 hrs to home.    Code Status: Level 1 - Full Code    Subjective   Patient seen at bedside; reports feeling dizzy, lightheaded. Had been getting dressed to go home. Denies any SOB, chest pain. No fever or chills. No nausea or vomiting.     Objective :  Temp:  [98 °F (36.7 °C)-98.1 °F (36.7 °C)] 98.1 °F  (36.7 °C)  HR:  [43-57] 43  BP: (101-133)/(41-60) 109/47  Resp:  [16-18] 16  SpO2:  [91 %] 91 %  O2 Device: None (Room air)    Body mass index is 31.74 kg/m².     Input and Output Summary (last 24 hours):   No intake or output data in the 24 hours ending 11/29/24 1544    Physical Exam  Vitals and nursing note reviewed.   Constitutional:       Comments: Patient pale, appears tired, states he does not feel well; dizzy and lightheaded    HENT:      Head: Normocephalic.      Nose: Nose normal.   Eyes:      Extraocular Movements: Extraocular movements intact.      Conjunctiva/sclera: Conjunctivae normal.      Pupils: Pupils are equal, round, and reactive to light.   Cardiovascular:      Rate and Rhythm: Regular rhythm. Bradycardia present.      Pulses: Normal pulses.   Pulmonary:      Effort: Pulmonary effort is normal.      Breath sounds: Normal breath sounds.   Abdominal:      General: Bowel sounds are normal. There is no distension.      Palpations: Abdomen is soft.      Tenderness: There is no abdominal tenderness.   Genitourinary:     Comments: Voids spontaneously   Musculoskeletal:      Cervical back: Normal range of motion.      Comments: Ambulates with walker    Skin:     General: Skin is warm and dry.      Capillary Refill: Capillary refill takes less than 2 seconds.   Neurological:      General: No focal deficit present.      Mental Status: He is oriented to person, place, and time.   Psychiatric:         Mood and Affect: Mood normal.         Behavior: Behavior normal.         Thought Content: Thought content normal.         Judgment: Judgment normal.           Lines/Drains:  Lines/Drains/Airways       Active Status       Name Placement date Placement time Site Days    HD Permanent Double Catheter --  --  Internal jugular  --                      Telemetry:  Telemetry Orders (From admission, onward)               24 Hour Telemetry Monitoring  Continuous x 24 Hours (Telem)        Question:  Reason for 24 Hour  Telemetry  Answer:  Arrhythmias requiring acute medical intervention / PPM or ICD malfunction                     Telemetry Reviewed: Sinus Bradycardia  Indication for Continued Telemetry Use: Arrthymias requiring medical therapy               Lab Results: I have reviewed the following results:   Results from last 7 days   Lab Units 11/29/24  0919 11/28/24  0606   WBC Thousand/uL 7.44 5.91   HEMOGLOBIN g/dL 8.4* 8.1*   HEMATOCRIT % 26.1* 24.9*   PLATELETS Thousands/uL 225 199   SEGS PCT %  --  52   LYMPHO PCT %  --  26   MONO PCT %  --  12   EOS PCT %  --  9*     Results from last 7 days   Lab Units 11/29/24  0919 11/28/24  0606   SODIUM mmol/L 127* 128*   POTASSIUM mmol/L 5.2 4.6   CHLORIDE mmol/L 92* 95*   CO2 mmol/L 25 26   BUN mg/dL 51* 32*   CREATININE mg/dL 7.11* 4.96*   ANION GAP mmol/L 10 7   CALCIUM mg/dL 9.2 8.8   ALBUMIN g/dL  --  3.6   TOTAL BILIRUBIN mg/dL  --  0.34   ALK PHOS U/L  --  84   ALT U/L  --  9   AST U/L  --  12*   GLUCOSE RANDOM mg/dL 110 89         Results from last 7 days   Lab Units 11/29/24  1208 11/29/24  0727 11/28/24  2049 11/28/24  0725 11/27/24  2109 11/27/24  1615 11/27/24  1159 11/27/24  0724 11/26/24  2019 11/26/24  1552 11/26/24  1058 11/26/24  0709   POC GLUCOSE mg/dl 104 103 106 86 105 128 111 106 139 121 120 93               Recent Cultures (last 7 days):         Imaging Results Review: No pertinent imaging studies reviewed.  Other Study Results Review: EKG was reviewed.     Last 24 Hours Medication List:     Current Facility-Administered Medications:     acetaminophen (TYLENOL) tablet 650 mg, Q6H PRN    allopurinol (ZYLOPRIM) tablet 100 mg, Daily    aspirin chewable tablet 81 mg, Daily    atorvastatin (LIPITOR) tablet 80 mg, Daily    benzonatate (TESSALON PERLES) capsule 100 mg, TID PRN    calcium carbonate (TUMS) chewable tablet 750 mg, BID PRN    epoetin jessica (EPOGEN,PROCRIT) injection 3,000 Units, After Dialysis    gabapentin (NEURONTIN) capsule 200 mg, BID    heparin  (porcine) subcutaneous injection 5,000 Units, Q12H KEMAL    hydrOXYzine HCL (ATARAX) tablet 25 mg, Q6H PRN    insulin lispro (HumALOG/ADMELOG) 100 units/mL subcutaneous injection 1-6 Units, TID AC **AND** Fingerstick Glucose (POCT), TID AC    insulin lispro (HumALOG/ADMELOG) 100 units/mL subcutaneous injection 1-6 Units, HS    melatonin tablet 3 mg, HS PRN    NIFEdipine (PROCARDIA XL) 24 hr tablet 60 mg, After Dinner    oxyCODONE (ROXICODONE) IR tablet 5 mg, Q8H PRN    oxyCODONE (ROXICODONE) split tablet 2.5 mg, Q8H PRN    polyethylene glycol (MIRALAX) packet 17 g, Daily PRN    sevelamer (RENAGEL) tablet 800 mg, TID With Meals    Administrative Statements   Today, Patient Was Seen By: GRANT Valdivia  I have spent a total time of greater than 45 minutes in caring for this patient on the day of the visit/encounter including Diagnostic results, Counseling / Coordination of care, Documenting in the medical record, Reviewing / ordering tests, medicine, procedures  , and Communicating with other healthcare professionals .    **Please Note: This note may have been constructed using a voice recognition system.**

## 2024-11-29 NOTE — ASSESSMENT & PLAN NOTE
Secondary to volume overload related to end-stage renal disease. Sodium 127 today.  Nephrology following   Plan for dialysis Saturday

## 2024-11-29 NOTE — CASE MANAGEMENT
Case Management Discharge Planning Note    Patient name Naresh Carpio  Location [address] MRN [id number]  : 1960 Date 2024       Current Admission Date: 2024  Current Admission Diagnosis:Acute hypoxic respiratory failure (HCC)   Patient Active Problem List    Diagnosis Date Noted Date Diagnosed    Acute cough 2024     Chronic wound 2024     Acute hypoxic respiratory failure (HCC) 2024     Acute on chronic diastolic (congestive) heart failure ([facility]) 2024     Pulmonary edema 2024     Pleural effusion 2024     Elevated troponin 2024     Hyperphosphatemia 2024     Fall 2024     Melena 2024     Paroxysmal atrial fibrillation ([facility])      ESRD (end stage renal disease) ([facility]) 10/25/2024     Nausea 10/20/2024     Hyponatremia 10/19/2024     Chronic kidney disease-mineral and bone disorder 10/14/2024     Falls 10/08/2024     PAD (peripheral artery disease) ([facility]) 10/08/2024     Closed fracture of right pelvis ([facility]) 2024     Bilateral sacral fracture, closed ([facility]) 2024     Hyperkalemia 2024     Difficulty with speech 2024     History of amputation of hallux ([facility]) 2024     Hypertensive urgency 2024     Facial cellulitis 2024     Constipation 2023     Bradycardia 2023     Cellulitis of right foot      Polymicrobial bacterial infection 2023     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 mellitus, with necrosis of bone ([facility])      Acquired deformity of foot, right      Charcot's joint      Subacute osteomyelitis of right foot ([facility])      Open wound of right foot 2023     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 mellitus, with bone involvement without evidence of necrosis ([facility])      Diabetic ulcer of left midfoot associated with type 2 diabetes mellitus, limited to breakdown of skin ([facility])      Diabetic polyneuropathy associated  with type 2 diabetes mellitus (HCC)      Diabetes mellitus type 2 with peripheral artery disease (HCC)      History of amputation of lesser toe of left foot (HCC)      Onychomycosis      Status post fall 08/19/2023     Traumatic rhabdomyolysis (HCC) 08/19/2023     Leukocytosis 08/19/2023     Osteoarthritis of left hip 07/27/2022     Severe Left Orchalgia 07/26/2022     Right shoulder pain 07/26/2022     Left hip pain 07/06/2022     Hemodialysis status (HCC)      Hypervolemia      Anemia due to chronic kidney disease, on chronic dialysis (HCC) 01/21/2022     Type II diabetes mellitus (HCC)      Hypertension      History of TIA (transient ischemic attack)      T10 vertebral fracture (HCC)        LOS (days): 10  Geometric Mean LOS (GMLOS) (days): 4.4  Days to GMLOS:-5.7     OBJECTIVE:  Risk of Unplanned Readmission Score: 37.35     Current admission status: Inpatient   Preferred Pharmacy:   [id number] Replaced by [facility] [address] [place] [address]  [address] [address]  Phone: [phone] Fax: [phone]    Primary Care Provider: Jeffrey Jackson    Primary Insurance: MEDICARE  Secondary Insurance:     DISCHARGE DETAILS:    Other Referral/Resources/Interventions Provided:  Referral Comments: CM requested [facility] transporation in [employer].    Number/Name of Dispatcher: TBD    Update at 1600: CM made aware by [employer]  that [facility] will provide [facility] transport for patient,  time will be 1700. CM made nursing and provider aware.     CM made aware by nursing that patient refuses to leave stating it is too dark outside and has concerns about safely getting up the steps at his home. CM sent secure chat message to [email or web address]  to cancel transport. CM confirmed with nursing and provider transport for patient has been cancelled for today and is confirmed for tomorrow, 11/29 at 1100.

## 2024-11-29 NOTE — ASSESSMENT & PLAN NOTE
Patient was seen and examined in the emergency department and will be admitted to the hospital for further evaluation and management.  Patient presenting with shortness of breath found to have evidence of acute hypoxic respiratory failure. Chest x-ray showing evidence of pulmonary edema with bilateral pleural effusion secondary to volume overload from acute on chronic diastolic congestive heart failure brought on by the missed dialysis session this weekend.  Fluid management with hemodialysis per nephrology.  Continue supplemental oxygen therapy as needed and wean as tolerated.    Currently on room air.    Discussed with nephrology today, plan for dialysis again on 11/27, okay for discharge post hemodialysis.  Discussed with patient and he is agreeable at this time and will follow-up with his outpatient dialysis  11/28: Patient previously scheduled for discharge today could not be transported due to: Unavailability of transportation  CM recs: the trip is in the community - we may not be able to get this done today as [facility] is our only community partner available for  in [place].    Patient was nearing discharge however felt dizzy, lightheaded.  Twelve-lead EKG was performed which demonstrated sinus bradycardia rate of 53 with first-degree AV block which is new for patient.  Placed on telemetry.  Curb sided cardiology, recommend holding nifedipine and monitor  Repeat twelve-lead EKG in a.m.

## 2024-11-29 NOTE — PROGRESS NOTES
Progress Note - Nephrology   Name: Naresh Carpio 64 y.o. male I MRN: [id number]  Unit/Bed#: 4 [place] 421-01 I Date of Admission: 11/19/2024   Date of Service: 11/29/2024 I Hospital Day: 10     Assessment & Plan  ESRD (end stage renal disease) ([facility])  Dialysis unit/days: [facility]  Access: PermCath  Patient is TTS  Plan for dialysis tomorrow as per schedule if remains inpatient otherwise patient can continue outpatient dialysis   kg  Renal Diet  Fluid restriction 1-1.5L/d  Adjust medications to GFR<10  Patient is a stable for discharge from my end    Anemia due to chronic kidney disease, on chronic dialysis ([facility])    Current hemoglobin: 8.1 mg/dL   Etiology secondary to  ESRD  Treatment:  Continue with Epogen 3000 units on dialysis  Transfuse for hemoglobin less than 7.0 per primary service  Hypertension  Volume: Euvolemic  Blood pressure: Normotensive, /60  Ultrafiltration on HD  Nifedipine 60 mg daily   Chronic kidney disease-mineral and bone disorder  Continue sevelamer 800 mg 3 times daily with meals  Type II diabetes mellitus (HCC)  HbA1c 5.5  Advised to maintain a good DM control  Maintain healthy diet (vegetables, fruits, whole grains, nonfat or low fat)  Weight loss  Physical activity (5 to 10 minutes to start the increase to 30 min a day)    Chronic wound  Management as per primary team     I have reviewed the nephrology recommendations including dialysis tomorrow at outpatient unit, with primary team, and we are in agreement with renal plan including the information outlined above. Ok for discharge from Nephrology service perspective.    Subjective   Brief History of Admission - 63 yo man with PMH of ESRD on HD, hypertension, diabetes, chronic lower extremity wounds p/w shortness of breath. Nephrology is consulted for management of ESRD     Patient is upset because he was not picked up yesterday.  Feels well, no shortness of breath, no chest pain    Objective :  Temp:  [98 °F (36.7 °C)-98.1 °F  (36.7 °C)] 98 °F (36.7 °C)  HR:  [57-75] 57  BP: (133-158)/(60-63) 133/60  Resp:  [16-18] 18  O2 Device: None (Room air)    Current Weight: Weight - Scale: 106 kg (234 lb)  First Weight: Weight - Scale: 106 kg (234 lb)  I/O         11/27 0701  11/28 0700 11/28 0701 11/29 0700    I.V. (mL/kg) 500 (4.7)     Total Intake(mL/kg) 500 (4.7)     Urine (mL/kg/hr) 100 (0)     Other 3000     Total Output 3100     Net -2600                 Physical Exam  General:  no acute distress at this time  Skin:  No acute rash  Eyes:  No scleral icterus and noninjected  ENT:  mucous membranes moist  Neck:  no carotid bruits  Chest:  Clear to auscultation percussion, good respiratory effort, no use of accessory respiratory muscles  CVS:  Regular rate and rhythm without rub   Abdomen:  soft and nontender   Extremities:lower extremity edema with wounds  Neuro:  No gross focality  Psych:  Alert , cooperative   Dialysis access:PermCath  Medications:    Current Facility-Administered Medications:     acetaminophen (TYLENOL) tablet 650 mg, 650 mg, Oral, Q6H PRN, Milan Ruiz MD    allopurinol (ZYLOPRIM) tablet 100 mg, 100 mg, Oral, Daily, Milan Ruiz MD, 100 mg at 11/28/24 0841    aspirin chewable tablet 81 mg, 81 mg, Oral, Daily, Milan Ruiz MD, 81 mg at 11/28/24 0841    atorvastatin (LIPITOR) tablet 80 mg, 80 mg, Oral, Daily, Milan Ruiz MD, 80 mg at 11/28/24 0841    benzonatate (TESSALON PERLES) capsule 100 mg, 100 mg, Oral, TID PRN, GRANT Church    calcium carbonate (TUMS) chewable tablet 750 mg, 750 mg, Oral, BID PRN, GRANT Rich, 750 mg at 11/24/24 2003    epoetin jessica (EPOGEN,PROCRIT) injection 3,000 Units, 3,000 Units, Intravenous, After Dialysis, Joselyn Reyes Bahamonde, MD, 3,000 Units at 11/27/24 1243    gabapentin (NEURONTIN) capsule 200 mg, 200 mg, Oral, BID, Milan Ruiz MD, 200 mg at 11/28/24 1757    heparin (porcine) subcutaneous injection 5,000 Units, 5,000  Units, Subcutaneous, Q12H KEMAL, Milan Ruiz MD, 5,000 Units at 11/28/24 2040    hydrOXYzine HCL (ATARAX) tablet 25 mg, 25 mg, Oral, Q6H PRN, GRANT Church, 25 mg at 11/27/24 1809    insulin lispro (HumALOG/ADMELOG) 100 units/mL subcutaneous injection 1-6 Units, 1-6 Units, Subcutaneous, TID AC **AND** Fingerstick Glucose (POCT), , , TID AC, Milan Ruiz MD    insulin lispro (HumALOG/ADMELOG) 100 units/mL subcutaneous injection 1-6 Units, 1-6 Units, Subcutaneous, HS, Milan Ruiz MD    melatonin tablet 3 mg, 3 mg, Oral, HS PRN, GRANT Church, 3 mg at 11/22/24 2223    NIFEdipine (PROCARDIA XL) 24 hr tablet 60 mg, 60 mg, Oral, After Dinner, Milan Ruiz MD, 60 mg at 11/28/24 1757    oxyCODONE (ROXICODONE) IR tablet 5 mg, 5 mg, Oral, Q8H PRN, Milan Ruiz MD, 5 mg at 11/28/24 1757    oxyCODONE (ROXICODONE) split tablet 2.5 mg, 2.5 mg, Oral, Q8H PRN, Milan Ruiz MD, 2.5 mg at 11/28/24 2037    polyethylene glycol (MIRALAX) packet 17 g, 17 g, Oral, Daily PRN, Milan Ruiz MD    sevelamer (RENAGEL) tablet 800 mg, 800 mg, Oral, TID With Meals, German Linton MD, 800 mg at 11/28/24 1650      Lab Results: I have reviewed the following results:  Results from last 7 days   Lab Units 11/28/24  0606 11/27/24  0825 11/26/24  0514 11/25/24  0836 11/24/24  0502 11/23/24  0454   WBC Thousand/uL 5.91 6.21 6.30 6.62 6.18 6.04   HEMOGLOBIN g/dL 8.1* 8.2* 8.0* 8.3* 8.0* 8.2*   HEMATOCRIT % 24.9* 25.7* 24.4* 25.8* 24.6* 25.8*   PLATELETS Thousands/uL 199 225 205 247 235 245   POTASSIUM mmol/L 4.6 4.9 4.5 4.7 4.3 4.7   CHLORIDE mmol/L 95* 94* 96 91* 93* 94*   CO2 mmol/L 26 27 27 28 27 27   BUN mg/dL 32* 46* 30* 48* 32* 50*   CREATININE mg/dL 4.96* 6.39* 4.48* 6.47* 4.75* 6.61*   CALCIUM mg/dL 8.8 8.4 8.8 8.7 8.0* 8.0*   MAGNESIUM mg/dL  --   --   --   --  2.1  --    ALBUMIN g/dL 3.6  --   --   --   --  3.7       Administrative Statements     Portions of the  "record may have been created with voice recognition software. Occasional wrong word or \"sound a like\" substitutions may have occurred due to the inherent limitations of voice recognition software. Read the chart carefully and recognize, using context, where substitutions have occurred.If you have any questions, please contact the dictating provider.  "

## 2024-11-29 NOTE — CASE MANAGEMENT
Case Management Progress Note    Patient name Naresh Carpio  Location 4 [place] 421/4 [place] 421-* MRN [id number]  : 1960 Date 2024       LOS (days): 10  Geometric Mean LOS (GMLOS) (days): 4.4  Days to GMLOS:-5.7        OBJECTIVE:        Current admission status: Inpatient  Preferred Pharmacy:   [id number] - [place], [place] - [address] [address]  [address] [address]  Phone: [phone] Fax: [phone]    Primary Care Provider: Jeffrey Jackson    Primary Insurance: MEDICARE  Secondary Insurance:     PROGRESS NOTE:    SW notified by [facility] that patient is not medically stable for discharge home. [facility] transport canceled in Roundtrip.

## 2024-11-29 NOTE — CASE MANAGEMENT
Case Management Progress Note    Patient name Naresh Carpio  Location 4 [place] 421/4 [address] 421-* MRN [id number]  : 1960 Date 2024       LOS (days): 10  Geometric Mean LOS (GMLOS) (days): 4.4  Days to GMLOS:-5.7        OBJECTIVE:    Current admission status: Inpatient  Preferred Pharmacy:   [id number] - [place], [place] - [address] [address]  [address] [address]  Phone: [phone] Fax: [phone]    Primary Care Provider: Jeffrey Jackson    Primary Insurance: MEDICARE  Secondary Insurance:     PROGRESS NOTE:    CM sent secure chat reminder to provider and nursing regarding requested transportation for 11:00am.     Update at 1:00pm: CM made aware by nursing that transportation has not arrived. CM sent secure chat message to [facility] dispatcher to inquire on transport ETA. CM was made aware that transportation was requested for 11:00pm not 11:00am and the company providing transportation is unavailable due to the holiday. The new transport time has been confirmed for Friday,  at 11:00am. CM made nursing and provider aware.

## 2024-11-29 NOTE — ASSESSMENT & PLAN NOTE
Current hemoglobin: 8.1 mg/dL   Etiology secondary to  ESRD  Treatment:  Continue with Epogen 3000 units on dialysis  Transfuse for hemoglobin less than 7.0 per primary service

## 2024-11-29 NOTE — ASSESSMENT & PLAN NOTE
Lab Results   Component Value Date    EGFR 7 11/29/2024    EGFR 11 11/28/2024    EGFR 8 11/27/2024    CREATININE 7.11 (H) 11/29/2024    CREATININE 4.96 (H) 11/28/2024    CREATININE 6.39 (H) 11/27/2024   Dialysis days: Tuesday, Thursday, Saturday  Nephrology following

## 2024-11-29 NOTE — ASSESSMENT & PLAN NOTE
Wt Readings from Last 3 Encounters:   11/19/24 106 kg (234 lb)   11/07/24 106 kg (233 lb 14.5 oz)   10/22/24 97.7 kg (215 lb 6.2 oz)     Chest x-ray showing evidence of pulmonary edema with bilateral pleural effusions secondary to volume overload from acute on chronic diastolic congestive heart failure brought on by his missed dialysis session this weekend.    Fluid management with hemodialysis per nephrology.  Improving

## 2024-11-29 NOTE — ASSESSMENT & PLAN NOTE
Lab Results   Component Value Date    EGFR 7 11/29/2024    EGFR 11 11/28/2024    EGFR 8 11/27/2024    CREATININE 7.11 (H) 11/29/2024    CREATININE 4.96 (H) 11/28/2024    CREATININE 6.39 (H) 11/27/2024     Continue  Epogen 3000 units on HD  Hemoglobin stable 8.4

## 2024-11-29 NOTE — ASSESSMENT & PLAN NOTE
Lab Results   Component Value Date    EGFR 7 11/29/2024    EGFR 11 11/28/2024    EGFR 8 11/27/2024    CREATININE 7.11 (H) 11/29/2024    CREATININE 4.96 (H) 11/28/2024    CREATININE 6.39 (H) 11/27/2024   As noted above, patient with a missed dialysis session this weekend.    Continue hemodialysis support per recommendations from nephrology.  As noted above plan was for patient to be discharged meaning of 11/29, however developed dizziness, low BP and low heart rate  Will monitor overnight, albumin provided, on telemetry.  Hold nifedipine in a.m.

## 2024-11-29 NOTE — ASSESSMENT & PLAN NOTE
Volume: Euvolemic  Blood pressure: Normotensive, /60  Ultrafiltration on HD  Nifedipine 60 mg daily

## 2024-11-30 ENCOUNTER — APPOINTMENT (INPATIENT)
Dept: DIALYSIS | Facility: HOSPITAL | Age: 64
DRG: 291 | End: 2024-11-30
Attending: INTERNAL MEDICINE
Payer: MEDICARE

## 2024-11-30 LAB
ANION GAP SERPL CALCULATED.3IONS-SCNC: 10 MMOL/L (ref 4–13)
BUN SERPL-MCNC: 72 MG/DL (ref 5–25)
CALCIUM SERPL-MCNC: 8.6 MG/DL (ref 8.4–10.2)
CHLORIDE SERPL-SCNC: 92 MMOL/L (ref 96–108)
CO2 SERPL-SCNC: 26 MMOL/L (ref 21–32)
CREAT SERPL-MCNC: 8.83 MG/DL (ref 0.6–1.3)
ERYTHROCYTE [DISTWIDTH] IN BLOOD BY AUTOMATED COUNT: 14.5 % (ref 11.6–15.1)
GFR SERPL CREATININE-BSD FRML MDRD: 5 ML/MIN/1.73SQ M
GLUCOSE SERPL-MCNC: 89 MG/DL (ref 65–140)
GLUCOSE SERPL-MCNC: 89 MG/DL (ref 65–140)
GLUCOSE SERPL-MCNC: 94 MG/DL (ref 65–140)
GLUCOSE SERPL-MCNC: 94 MG/DL (ref 65–140)
GLUCOSE SERPL-MCNC: 96 MG/DL (ref 65–140)
GLUCOSE SERPL-MCNC: 99 MG/DL (ref 65–140)
HCT VFR BLD AUTO: 24.2 % (ref 36.5–49.3)
HGB BLD-MCNC: 7.9 G/DL (ref 12–17)
MAGNESIUM SERPL-MCNC: 2.4 MG/DL (ref 1.9–2.7)
MCH RBC QN AUTO: 30.5 PG (ref 26.8–34.3)
MCHC RBC AUTO-ENTMCNC: 32.6 G/DL (ref 31.4–37.4)
MCV RBC AUTO: 93 FL (ref 82–98)
PLATELET # BLD AUTO: 211 THOUSANDS/UL (ref 149–390)
PMV BLD AUTO: 9.9 FL (ref 8.9–12.7)
POTASSIUM SERPL-SCNC: 5.8 MMOL/L (ref 3.5–5.3)
RBC # BLD AUTO: 2.59 MILLION/UL (ref 3.88–5.62)
SODIUM SERPL-SCNC: 128 MMOL/L (ref 135–147)
WBC # BLD AUTO: 6.13 THOUSAND/UL (ref 4.31–10.16)

## 2024-11-30 PROCEDURE — 99232 SBSQ HOSP IP/OBS MODERATE 35: CPT | Performed by: STUDENT IN AN ORGANIZED HEALTH CARE EDUCATION/TRAINING PROGRAM

## 2024-11-30 PROCEDURE — 99232 SBSQ HOSP IP/OBS MODERATE 35: CPT | Performed by: NURSE PRACTITIONER

## 2024-11-30 PROCEDURE — 94760 N-INVAS EAR/PLS OXIMETRY 1: CPT

## 2024-11-30 PROCEDURE — 80048 BASIC METABOLIC PNL TOTAL CA: CPT | Performed by: NURSE PRACTITIONER

## 2024-11-30 PROCEDURE — 82948 REAGENT STRIP/BLOOD GLUCOSE: CPT

## 2024-11-30 PROCEDURE — 83735 ASSAY OF MAGNESIUM: CPT | Performed by: NURSE PRACTITIONER

## 2024-11-30 PROCEDURE — 85027 COMPLETE CBC AUTOMATED: CPT | Performed by: NURSE PRACTITIONER

## 2024-11-30 RX ADMIN — Medication 2.5 MG: at 17:03

## 2024-11-30 RX ADMIN — SEVELAMER HYDROCHLORIDE 800 MG: 800 TABLET, FILM COATED ORAL at 07:58

## 2024-11-30 RX ADMIN — GABAPENTIN 200 MG: 100 CAPSULE ORAL at 09:27

## 2024-11-30 RX ADMIN — SEVELAMER HYDROCHLORIDE 800 MG: 800 TABLET, FILM COATED ORAL at 12:10

## 2024-11-30 RX ADMIN — POLYETHYLENE GLYCOL 3350 17 G: 17 POWDER, FOR SOLUTION ORAL at 23:24

## 2024-11-30 RX ADMIN — OXYCODONE HYDROCHLORIDE 5 MG: 5 TABLET ORAL at 07:57

## 2024-11-30 RX ADMIN — EPOETIN ALFA 3000 UNITS: 3000 SOLUTION INTRAVENOUS; SUBCUTANEOUS at 17:49

## 2024-11-30 RX ADMIN — ALLOPURINOL 100 MG: 100 TABLET ORAL at 09:27

## 2024-11-30 RX ADMIN — GABAPENTIN 200 MG: 100 CAPSULE ORAL at 17:04

## 2024-11-30 RX ADMIN — HEPARIN SODIUM 5000 UNITS: 5000 INJECTION, SOLUTION INTRAVENOUS; SUBCUTANEOUS at 09:27

## 2024-11-30 RX ADMIN — OXYCODONE HYDROCHLORIDE 5 MG: 5 TABLET ORAL at 23:14

## 2024-11-30 RX ADMIN — ATORVASTATIN CALCIUM 80 MG: 80 TABLET, FILM COATED ORAL at 09:27

## 2024-11-30 RX ADMIN — ASPIRIN 81 MG CHEWABLE TABLET 81 MG: 81 TABLET CHEWABLE at 09:27

## 2024-11-30 NOTE — ASSESSMENT & PLAN NOTE
Chest x-ray showing evidence of pulmonary edema with bilateral pleural effusion secondary to volume overload from acute on chronic diastolic congestive heart failure brought on by the missed dialysis session previous weekend.    Fluid management with hemodialysis per nephrology.   Patient appears euvolemic at this time  Weaned off O2

## 2024-11-30 NOTE — ASSESSMENT & PLAN NOTE
Morning of 11/29 patient was preparing for discharge when he felt lightheaded and dizzy.  Noted to be hypotensive, heart rate 49.  Twelve-lead EKG performed which demonstrated sinus bradycardia rate of 53 with first-degree AV block which is new.  Discussed with cardiology, recommend holding nifedipine and monitor.  Remains on telemetry, sinus rhythm 60s  Discussed with nephrology, recommend 1 dose albumin  BP improved  Monitor

## 2024-11-30 NOTE — ASSESSMENT & PLAN NOTE
Current hemoglobin: 8.4 mg/dL    Etiology secondary to  ESRD  Treatment:  Continue with Epogen 3000 units on dialysis  Transfuse for hemoglobin less than 7.0 per primary service

## 2024-11-30 NOTE — ASSESSMENT & PLAN NOTE
Volume: Euvolemic on exam  Blood pressure: Borderline hypertension, /64  UF on HD  Nifedipine 60 mg daily

## 2024-11-30 NOTE — ASSESSMENT & PLAN NOTE
Continue aspirin 81 mg  EKG shows sinus bradycardia 53 first-degree AV block  Monitor on telemetry, SR 60s  Will hold nifedipine

## 2024-11-30 NOTE — ASSESSMENT & PLAN NOTE
Dialysis unit/days: [facility]  Access: PermCath  Patient is TTS  Plan for HD today    kg  Renal Diet  Fluid restriction 1-1.5L/d  Adjust medications to GFR<10  Stable for discharge from my end

## 2024-11-30 NOTE — ASSESSMENT & PLAN NOTE
Wt Readings from Last 3 Encounters:   11/19/24 106 kg (234 lb)   11/07/24 106 kg (233 lb 14.5 oz)   10/22/24 97.7 kg (215 lb 6.2 oz)     Chest x-ray showing evidence of pulmonary edema with bilateral pleural effusions secondary to volume overload from acute on chronic diastolic congestive heart failure brought on by his missed dialysis session previous weekend.    Fluid management with hemodialysis per nephrology.  Improving

## 2024-11-30 NOTE — ASSESSMENT & PLAN NOTE
Lab Results   Component Value Date    EGFR 7 11/29/2024    EGFR 11 11/28/2024    EGFR 8 11/27/2024    CREATININE 7.11 (H) 11/29/2024    CREATININE 4.96 (H) 11/28/2024    CREATININE 6.39 (H) 11/27/2024   As noted above, patient with a missed dialysis session previous weekend.    Continue hemodialysis support per recommendations from nephrology.  As noted above plan was for patient to be discharged meaning of 11/29, however developed dizziness, low BP and low heart rate  Will monitor overnight, albumin provided, on telemetry.  Hold nifedipine in a.m.  BP and HR improved

## 2024-11-30 NOTE — ASSESSMENT & PLAN NOTE
Patient was bradycardic yesterday symptomatic  EKG with first-degree AV block  Follow-up with cardiology

## 2024-11-30 NOTE — ASSESSMENT & PLAN NOTE
Lab Results   Component Value Date    HGBA1C 5.5 10/08/2024       Recent Labs     11/29/24  1610 11/29/24  2047 11/30/24  0713 11/30/24  1132   POCGLU 106 100 99 94       Blood Sugar Average: Last 72 hrs:  (P) 104  Patient will be placed on an insulin sliding scale.  Will continue to monitor blood sugar levels and make further adjustments as needed.

## 2024-11-30 NOTE — PROGRESS NOTES
Progress Note - Nephrology   Name: Naresh Carpio 64 y.o. male I MRN: [id number]  Unit/Bed#: [address] [address] I Date of Admission: 11/19/2024   Date of Service: 11/30/2024 I Hospital Day: 11     Assessment & Plan  ESRD (end stage renal disease) ([facility])  Dialysis unit/days: [facility]  Access: PermCath  Patient is TTS  Plan for HD today    kg  Renal Diet  Fluid restriction 1-1.5L/d  Adjust medications to GFR<10  Stable for discharge from my end    Anemia due to chronic kidney disease, on chronic dialysis ([facility])    Current hemoglobin: 8.4 mg/dL    Etiology secondary to  ESRD  Treatment:  Continue with Epogen 3000 units on dialysis  Transfuse for hemoglobin less than 7.0 per primary service  Hypertension  Volume: Euvolemic on exam  Blood pressure: Borderline hypertension, /64  UF on HD  Nifedipine 60 mg daily   Chronic kidney disease-mineral and bone disorder  Continue sevelamer 800 mg 3 times daily with meals  Type II diabetes mellitus (HCC)  HbA1c 5.5  Advised to maintain a good DM control  Maintain healthy diet (vegetables, fruits, whole grains, nonfat or low fat)  Weight loss  Physical activity (5 to 10 minutes to start the increase to 30 min a day)    Chronic wound  PT, OT  Management as per primary team  Bradycardia  Patient was bradycardic yesterday symptomatic  EKG with first-degree AV block  Follow-up with cardiology  Acute on chronic diastolic (congestive) heart failure (HCC)  UF on HD  Low-sodium diet    I have reviewed the nephrology recommendations including dialysis today, with primary team, and we are in agreement with renal plan including the information outlined above. Ok for discharge from Nephrology service perspective.    Subjective   Brief History of Admission - 65 yo man with PMH of ESRD on HD, hypertension, diabetes, chronic lower extremity wounds p/w shortness of breath. Nephrology is consulted for management of ESRD     Patient feels better, no shortness of breath, no chest pain no  dizziness    Objective :  Temp:  [97.7 °F (36.5 °C)-98.1 °F (36.7 °C)] 98 °F (36.7 °C)  HR:  [43-61] 54  BP: (101-158)/(41-67) 155/64  Resp:  [16-17] 16  SpO2:  [88 %-99 %] 99 %  O2 Device: Nasal cannula  Nasal Cannula O2 Flow Rate (L/min):  [2 L/min] 2 L/min    Current Weight: Weight - Scale: 106 kg (234 lb)  First Weight: Weight - Scale: 106 kg (234 lb)  I/O         11/28 0701  11/29 0700 11/29 0701 11/30 0700    I.V. (mL/kg)  10 (0.1)    Total Intake(mL/kg)  10 (0.1)    Urine (mL/kg/hr)  480 (0.2)    Total Output  480    Net  -470                Physical Exam  General:  no acute distress at this time  Skin:  No acute rash  Eyes:  No scleral icterus and noninjected  ENT:  mucous membranes moist  Neck:  no carotid bruits  Chest:  Clear to auscultation percussion, good respiratory effort, no use of accessory respiratory muscles  CVS:  Regular rate and rhythm without rub   Abdomen:  soft and nontender   Extremities: lower extremity edema  Neuro:  No gross focality  Psych:  Alert , cooperative   Dialysis access: PermCath    Medications:    Current Facility-Administered Medications:     acetaminophen (TYLENOL) tablet 650 mg, 650 mg, Oral, Q6H PRN, Milan Ruiz MD    allopurinol (ZYLOPRIM) tablet 100 mg, 100 mg, Oral, Daily, Milan Ruiz MD, 100 mg at 11/29/24 0904    aspirin chewable tablet 81 mg, 81 mg, Oral, Daily, Milan Ruiz MD, 81 mg at 11/29/24 0904    atorvastatin (LIPITOR) tablet 80 mg, 80 mg, Oral, Daily, Milan Ruiz MD, 80 mg at 11/29/24 0904    benzonatate (TESSALON PERLES) capsule 100 mg, 100 mg, Oral, TID PRN, GRANT Church    calcium carbonate (TUMS) chewable tablet 750 mg, 750 mg, Oral, BID PRN, GRANT Rich, 750 mg at 11/24/24 2003    epoetin jessica (EPOGEN,PROCRIT) injection 3,000 Units, 3,000 Units, Intravenous, After Dialysis, Joselyn Reyes Bahamonde, MD, 3,000 Units at 11/27/24 1243    gabapentin (NEURONTIN) capsule 200 mg, 200 mg, Oral, BID,  Milan Ruiz MD, 200 mg at 11/29/24 1800    heparin (porcine) subcutaneous injection 5,000 Units, 5,000 Units, Subcutaneous, Q12H KEMAL, Milan Ruiz MD, 5,000 Units at 11/29/24 2138    hydrOXYzine HCL (ATARAX) tablet 25 mg, 25 mg, Oral, Q6H PRN, GRANT Church, 25 mg at 11/27/24 1809    insulin lispro (HumALOG/ADMELOG) 100 units/mL subcutaneous injection 1-6 Units, 1-6 Units, Subcutaneous, TID AC **AND** Fingerstick Glucose (POCT), , , TID AC, Milan Ruiz MD    insulin lispro (HumALOG/ADMELOG) 100 units/mL subcutaneous injection 1-6 Units, 1-6 Units, Subcutaneous, HS, Milan Ruiz MD    melatonin tablet 3 mg, 3 mg, Oral, HS PRN, GRANT Church, 3 mg at 11/22/24 2223    [Held by provider] NIFEdipine (PROCARDIA XL) 24 hr tablet 60 mg, 60 mg, Oral, After Dinner, Milan Ruiz MD, 60 mg at 11/28/24 1757    oxyCODONE (ROXICODONE) IR tablet 5 mg, 5 mg, Oral, Q8H PRN, Milan Ruiz MD, 5 mg at 11/29/24 1654    oxyCODONE (ROXICODONE) split tablet 2.5 mg, 2.5 mg, Oral, Q8H PRN, Milan Ruiz MD, 2.5 mg at 11/28/24 2037    polyethylene glycol (MIRALAX) packet 17 g, 17 g, Oral, Daily PRN, Milan Ruiz MD    sevelamer (RENAGEL) tablet 800 mg, 800 mg, Oral, TID With Meals, German Linton MD, 800 mg at 11/29/24 1654      Lab Results: I have reviewed the following results:  Results from last 7 days   Lab Units 11/29/24  0919 11/28/24  0606 11/27/24  0825 11/26/24  0514 11/25/24  0836 11/24/24  0502   WBC Thousand/uL 7.44 5.91 6.21 6.30 6.62 6.18   HEMOGLOBIN g/dL 8.4* 8.1* 8.2* 8.0* 8.3* 8.0*   HEMATOCRIT % 26.1* 24.9* 25.7* 24.4* 25.8* 24.6*   PLATELETS Thousands/uL 225 199 225 205 247 235   POTASSIUM mmol/L 5.2 4.6 4.9 4.5 4.7 4.3   CHLORIDE mmol/L 92* 95* 94* 96 91* 93*   CO2 mmol/L 25 26 27 27 28 27   BUN mg/dL 51* 32* 46* 30* 48* 32*   CREATININE mg/dL 7.11* 4.96* 6.39* 4.48* 6.47* 4.75*   CALCIUM mg/dL 9.2 8.8 8.4 8.8 8.7 8.0*   MAGNESIUM  "mg/dL  --   --   --   --   --  2.1   ALBUMIN g/dL  --  3.6  --   --   --   --        Administrative Statements     Portions of the record may have been created with voice recognition software. Occasional wrong word or \"sound a like\" substitutions may have occurred due to the inherent limitations of voice recognition software. Read the chart carefully and recognize, using context, where substitutions have occurred.If you have any questions, please contact the dictating provider.  "

## 2024-11-30 NOTE — PROGRESS NOTES
Progress Note - Hospitalist   Name: Naresh Carpio 64 y.o. male I MRN: [id number]  Unit/Bed#: [address] [place] [address] I Date of Admission: 11/19/2024   Date of Service: 11/30/2024 I Hospital Day: 11    Assessment & Plan  Acute hypoxic respiratory failure (HCC)  Patient was seen and examined in the emergency department and will be admitted to the hospital for further evaluation and management.  Patient presenting with shortness of breath found to have evidence of acute hypoxic respiratory failure. Chest x-ray showing evidence of pulmonary edema with bilateral pleural effusion secondary to volume overload from acute on chronic diastolic congestive heart failure brought on by the missed dialysis session this weekend.  Fluid management with hemodialysis per nephrology.  Continue supplemental oxygen therapy as needed and wean as tolerated.    Currently on room air.    Discussed with nephrology today, plan for dialysis again on 11/27, okay for discharge post hemodialysis.  Discussed with patient and he is agreeable at this time and will follow-up with his outpatient dialysis  11/28: Patient previously scheduled for discharge today could not be transported due to: Unavailability of transportation  CM recs: the trip is in the community - we may not be able to get this done today as [facility] is our only community partner available for  in [place].    11/29 Patient was nearing discharge however felt dizzy, lightheaded.  Twelve-lead EKG was performed which demonstrated sinus bradycardia rate of 53 with first-degree AV block which is new for patient.  Placed on telemetry.  Curb sided cardiology, recommend holding nifedipine and monitor     Patient has been mostly sinus rhythm in 60s, no events noted overnight.  Will monitor for today with blood pressure and heart rate off of nifedipine as patient had received nifedipine prior to event yesterday.  Nursing reported the patient did have some periods of hypoxia overnight, will order overnight  sleep screen        Anemia due to chronic kidney disease, on chronic dialysis ([facility])  Lab Results   Component Value Date    EGFR 7 11/29/2024    EGFR 11 11/28/2024    EGFR 8 11/27/2024    CREATININE 7.11 (H) 11/29/2024    CREATININE 4.96 (H) 11/28/2024    CREATININE 6.39 (H) 11/27/2024     Continue  Epogen 3000 units on HD  Hemoglobin stable 8.4  Type II diabetes mellitus ([facility])  Lab Results   Component Value Date    HGBA1C 5.5 10/08/2024       Recent Labs     11/29/24  1610 11/29/24 2047 11/30/24  0713 11/30/24  1132   POCGLU 106 100 99 94       Blood Sugar Average: Last 72 hrs:  (P) 104  Patient will be placed on an insulin sliding scale.  Will continue to monitor blood sugar levels and make further adjustments as needed.  Hypertension  Hold nifedipine in a.m. secondary to bradycardia/hypotension  Received 1 dose of albumin on 11/29  Chronic kidney disease-mineral and bone disorder  Lab Results   Component Value Date    EGFR 7 11/29/2024    EGFR 11 11/28/2024    EGFR 8 11/27/2024    CREATININE 7.11 (H) 11/29/2024    CREATININE 4.96 (H) 11/28/2024    CREATININE 6.39 (H) 11/27/2024   Dialysis days: Tuesday, Thursday, Saturday  Nephrology following  ESRD (end stage renal disease) ([facility])  Lab Results   Component Value Date    EGFR 7 11/29/2024    EGFR 11 11/28/2024    EGFR 8 11/27/2024    CREATININE 7.11 (H) 11/29/2024    CREATININE 4.96 (H) 11/28/2024    CREATININE 6.39 (H) 11/27/2024   As noted above, patient with a missed dialysis session previous weekend.    Continue hemodialysis support per recommendations from nephrology.  As noted above plan was for patient to be discharged meaning of 11/29, however developed dizziness, low BP and low heart rate  Will monitor overnight, albumin provided, on telemetry.  Hold nifedipine in a.m.  BP and HR improved   Paroxysmal atrial fibrillation (HCC)  Continue aspirin 81 mg  EKG shows sinus bradycardia 53 first-degree AV block  Monitor on telemetry, SR 60s  Will hold  nifedipine  Acute on chronic diastolic (congestive) heart failure (HCC)  Wt Readings from Last 3 Encounters:   11/19/24 106 kg (234 lb)   11/07/24 106 kg (233 lb 14.5 oz)   10/22/24 97.7 kg (215 lb 6.2 oz)     Chest x-ray showing evidence of pulmonary edema with bilateral pleural effusions secondary to volume overload from acute on chronic diastolic congestive heart failure brought on by his missed dialysis session previous weekend.    Fluid management with hemodialysis per nephrology.  Improving    Pulmonary edema  Chest x-ray showing evidence of pulmonary edema with bilateral pleural effusion secondary to volume overload from acute on chronic diastolic congestive heart failure brought on by the missed dialysis session previous weekend.    Fluid management with hemodialysis per nephrology.   Patient appears euvolemic at this time  Weaned off O2  Pleural effusion  Chest x-ray showing evidence of pulmonary edema with bilateral pleural effusion secondary to volume overload from acute on chronic diastolic congestive heart failure brought on by the missed dialysis session this weekend.    Fluid management with hemodialysis per nephrology.   Plan as above  Elevated troponin  Patient with elevated troponin levels likely secondary to demand ischemia from acute on chronic diastolic congestive heart failure from end-stage renal disease after a missed dialysis session.    Fluid management with hemodialysis per nephrology.  Denies chest pain  Chronic wound  Patient with evidence of bilateral venous stasis ulcerations and history of right partial first ray and left fifth digit amputations.  Patient was evaluated by podiatry with plans for local wound care.    Arterial doppler revealed new bilateral popliteal stenosis and increase in stenosis of the right tibio-peroneal trunk  Vascular surgery consult, appreciate input  Patient declined any surgical intervention this admission  Plan for close follow-up outpatient with  vascular  Hyponatremia  Secondary to volume overload related to end-stage renal disease. Sodium 127  Nephrology following   Plan for dialysis today   PAD (peripheral artery disease) (HCC)  LEAD 11/22  Right  50-75% stenosis proximal popliteal artery. >75 stenosis proximal tibio-peroneal trunk  SALVATORE:1.7, MTP: non-compressible. GTP 78 mmHg  Left  Greater than 75% stenosis proximal popliteal artery  SALVATORE: 1.7, MTP non compressible, GTP 50 mmHg.  Continue ASA and statin  Patient refusing intervention at this time and would like to follow up as an outpatient with vascular surgery  Local wound care per podiatry.        Bradycardia  Morning of 11/29 patient was preparing for discharge when he felt lightheaded and dizzy.  Noted to be hypotensive, heart rate 49.  Twelve-lead EKG performed which demonstrated sinus bradycardia rate of 53 with first-degree AV block which is new.  Discussed with cardiology, recommend holding nifedipine and monitor.  Remains on telemetry, sinus rhythm 60s  Discussed with nephrology, recommend 1 dose albumin  BP improved  Monitor    VTE Pharmacologic Prophylaxis:   Moderate Risk (Score 3-4) - Pharmacological DVT Prophylaxis Ordered: heparin.    Mobility:   Basic Mobility Inpatient Raw Score: 24  JH-HLM Goal: 8: Walk 250 feet or more  JH-HLM Achieved: 7: Walk 25 feet or more  JH-HLM Goal achieved. Continue to encourage appropriate mobility.    Patient Centered Rounds: I performed bedside rounds with nursing staff today.   Discussions with Specialists or Other Care Team Provider: multidisciplinary team     Education and Discussions with Family / Patient: Attempted to update  (sister) via phone. Unable to contact.    Current Length of Stay: 11 day(s)  Current Patient Status: Inpatient   Certification Statement: The patient will continue to require additional inpatient hospital stay due to telemetry, dialysis, repeat labs in am   Discharge Plan: Anticipate discharge tomorrow to  home.    Code Status: Level 1 - Full Code    Subjective   Seen sitting up in bed, reports he still does not feel 100% but does feel better than yesterday morning.  Heart rate has improved.  Blood pressure better.  He is agreeable to having dialysis today.  We discussed doing an overnight sleep screen as nursing staff reported patient was having some periods of hypoxia which patient is agreeable to.  Denies any headache or dizziness, no chest pain or shortness of breath.    Objective :  Temp:  [97.7 °F (36.5 °C)-98 °F (36.7 °C)] 97.9 °F (36.6 °C)  HR:  [54-62] 62  BP: (121-158)/(51-67) 153/62  Resp:  [16-18] 18  SpO2:  [88 %-99 %] 98 %  O2 Device: Nasal cannula  Nasal Cannula O2 Flow Rate (L/min):  [2 L/min] 2 L/min    Body mass index is 31.74 kg/m².     Input and Output Summary (last 24 hours):     Intake/Output Summary (Last 24 hours) at 11/30/2024 1430  Last data filed at 11/30/2024 0901  Gross per 24 hour   Intake 10 ml   Output 580 ml   Net -570 ml       Physical Exam  Vitals and nursing note reviewed.   Constitutional:       Appearance: Normal appearance.   HENT:      Head: Normocephalic.      Nose: Nose normal.      Mouth/Throat:      Mouth: Mucous membranes are moist.   Eyes:      Extraocular Movements: Extraocular movements intact.      Conjunctiva/sclera: Conjunctivae normal.      Pupils: Pupils are equal, round, and reactive to light.   Cardiovascular:      Rate and Rhythm: Normal rate and regular rhythm.      Pulses: Normal pulses.      Heart sounds: Normal heart sounds.   Pulmonary:      Effort: Pulmonary effort is normal.      Breath sounds: Normal breath sounds.   Abdominal:      General: Bowel sounds are normal. There is no distension.      Palpations: Abdomen is soft.      Tenderness: There is no abdominal tenderness.   Genitourinary:     Comments: Voiding spontaneously   Musculoskeletal:      Cervical back: Normal range of motion.      Right lower leg: No edema.      Left lower leg: No edema.       Comments: Ambulates with walker    Skin:     Capillary Refill: Capillary refill takes less than 2 seconds.      Comments: Bilateral LE leg dressings, no strike through drainage    Neurological:      General: No focal deficit present.      Mental Status: He is alert and oriented to person, place, and time.   Psychiatric:         Mood and Affect: Mood normal.         Behavior: Behavior normal.         Thought Content: Thought content normal.         Judgment: Judgment normal.           Lines/Drains:  Lines/Drains/Airways       Active Status       Name Placement date Placement time Site Days    HD Permanent Double Catheter --  --  Internal jugular  --                      Telemetry:  Telemetry Orders (From admission, onward)               24 Hour Telemetry Monitoring  Continuous x 24 Hours (Telem)        Question:  Reason for 24 Hour Telemetry  Answer:  Arrhythmias requiring acute medical intervention / PPM or ICD malfunction                     Telemetry Reviewed: Normal Sinus Rhythm  Indication for Continued Telemetry Use: Arrthymias requiring medical therapy               Lab Results: I have reviewed the following results:   Results from last 7 days   Lab Units 11/29/24  0919 11/28/24  0606   WBC Thousand/uL 7.44 5.91   HEMOGLOBIN g/dL 8.4* 8.1*   HEMATOCRIT % 26.1* 24.9*   PLATELETS Thousands/uL 225 199   SEGS PCT %  --  52   LYMPHO PCT %  --  26   MONO PCT %  --  12   EOS PCT %  --  9*     Results from last 7 days   Lab Units 11/29/24  0919 11/28/24  0606   SODIUM mmol/L 127* 128*   POTASSIUM mmol/L 5.2 4.6   CHLORIDE mmol/L 92* 95*   CO2 mmol/L 25 26   BUN mg/dL 51* 32*   CREATININE mg/dL 7.11* 4.96*   ANION GAP mmol/L 10 7   CALCIUM mg/dL 9.2 8.8   ALBUMIN g/dL  --  3.6   TOTAL BILIRUBIN mg/dL  --  0.34   ALK PHOS U/L  --  84   ALT U/L  --  9   AST U/L  --  12*   GLUCOSE RANDOM mg/dL 110 89         Results from last 7 days   Lab Units 11/30/24  1132 11/30/24  0713 11/29/24  2047 11/29/24  1610 11/29/24  1208  11/29/24  0727 11/28/24  2049 11/28/24  0725 11/27/24  2109 11/27/24  1615 11/27/24  1159 11/27/24  0724   POC GLUCOSE mg/dl 94 99 100 106 104 103 106 86 105 128 111 106               Recent Cultures (last 7 days):         Imaging Results Review: No pertinent imaging studies reviewed.  Other Study Results Review: No additional pertinent studies reviewed.    Last 24 Hours Medication List:     Current Facility-Administered Medications:     acetaminophen (TYLENOL) tablet 650 mg, Q6H PRN    allopurinol (ZYLOPRIM) tablet 100 mg, Daily    aspirin chewable tablet 81 mg, Daily    atorvastatin (LIPITOR) tablet 80 mg, Daily    benzonatate (TESSALON PERLES) capsule 100 mg, TID PRN    calcium carbonate (TUMS) chewable tablet 750 mg, BID PRN    epoetin jessica (EPOGEN,PROCRIT) injection 3,000 Units, After Dialysis    gabapentin (NEURONTIN) capsule 200 mg, BID    heparin (porcine) subcutaneous injection 5,000 Units, Q12H KEMAL    hydrOXYzine HCL (ATARAX) tablet 25 mg, Q6H PRN    insulin lispro (HumALOG/ADMELOG) 100 units/mL subcutaneous injection 1-6 Units, TID AC **AND** Fingerstick Glucose (POCT), TID AC    insulin lispro (HumALOG/ADMELOG) 100 units/mL subcutaneous injection 1-6 Units, HS    melatonin tablet 3 mg, HS PRN    [Held by provider] NIFEdipine (PROCARDIA XL) 24 hr tablet 60 mg, After Dinner    oxyCODONE (ROXICODONE) IR tablet 5 mg, Q8H PRN    oxyCODONE (ROXICODONE) split tablet 2.5 mg, Q8H PRN    polyethylene glycol (MIRALAX) packet 17 g, Daily PRN    sevelamer (RENAGEL) tablet 800 mg, TID With Meals    Administrative Statements   Today, Patient Was Seen By: GRANT Valdivia  I have spent a total time of greater than 45 minutes in caring for this patient on the day of the visit/encounter including Diagnostic results, Counseling / Coordination of care, Documenting in the medical record, Reviewing / ordering tests, medicine, procedures  , and Communicating with other healthcare professionals .    **Please Note:  This note may have been constructed using a voice recognition system.**

## 2024-11-30 NOTE — PLAN OF CARE
Post-Dialysis RN Treatment Note    Blood Pressure:  Pre 152/61 mm/Hg  Post 159/65 mmHg   EDW:  TBD kg    Weight:  Pre 104.6 kg   Post 101.6 kg   Mode of weight measurement: Bed Scale   Volume Removed:  2505 ml    Treatment duration: 210 minutes    NS given:  No    Treatment shortened No   Medications given during Rx: Epogen 3000 units   Estimated Kt/V:  None Reported   Access type: Permacath/TDC   Needle Gauge:  NA   Access Issues: No    Report called to primary nurse:   Yes Amara Venegas RN        Current hemodialysis plan of care is to remove a total of 3000 ml of fluid over a 3 1/2 hour treatment for a net of 2.5 liters as tolerated.  Monitor vital signs every 15 minutes while on treatment for patient safety.  Utilize a 2 K+ bath for serum potassium of 5.2 to maintain electrolyte balance.  Report received from Amara Venegas RN.  Plan reviewed with Dr Reyes-Bahamonde via [employer] Chat.        Problem: METABOLIC, FLUID AND ELECTROLYTES - ADULT  Goal: Electrolytes maintained within normal limits  Description: INTERVENTIONS:  - Monitor labs and assess patient for signs and symptoms of electrolyte imbalances  - Administer electrolyte replacement as ordered  - Monitor response to electrolyte replacements, including repeat lab results as appropriate  - Instruct patient on fluid and nutrition as appropriate  Outcome: Progressing  Goal: Fluid balance maintained  Description: INTERVENTIONS:  - Monitor labs   - Monitor I/O and WT  - Instruct patient on fluid and nutrition as appropriate  - Assess for signs & symptoms of volume excess or deficit  Outcome: Progressing

## 2024-11-30 NOTE — ASSESSMENT & PLAN NOTE
Patient was seen and examined in the emergency department and will be admitted to the hospital for further evaluation and management.  Patient presenting with shortness of breath found to have evidence of acute hypoxic respiratory failure. Chest x-ray showing evidence of pulmonary edema with bilateral pleural effusion secondary to volume overload from acute on chronic diastolic congestive heart failure brought on by the missed dialysis session this weekend.  Fluid management with hemodialysis per nephrology.  Continue supplemental oxygen therapy as needed and wean as tolerated.    Currently on room air.    Discussed with nephrology today, plan for dialysis again on 11/27, okay for discharge post hemodialysis.  Discussed with patient and he is agreeable at this time and will follow-up with his outpatient dialysis  11/28: Patient previously scheduled for discharge today could not be transported due to: Unavailability of transportation  CM recs: the trip is in the community - we may not be able to get this done today as [facility] is our only community partner available for  in [place].    11/29 Patient was nearing discharge however felt dizzy, lightheaded.  Twelve-lead EKG was performed which demonstrated sinus bradycardia rate of 53 with first-degree AV block which is new for patient.  Placed on telemetry.  Curb sided cardiology, recommend holding nifedipine and monitor     Patient has been mostly sinus rhythm in 60s, no events noted overnight.  Will monitor for today with blood pressure and heart rate off of nifedipine as patient had received nifedipine prior to event yesterday.  Nursing reported the patient did have some periods of hypoxia overnight, will order overnight sleep screen

## 2024-11-30 NOTE — ASSESSMENT & PLAN NOTE
Secondary to volume overload related to end-stage renal disease. Sodium 127  Nephrology following   Plan for dialysis today

## 2024-12-01 LAB
ANION GAP SERPL CALCULATED.3IONS-SCNC: 8 MMOL/L (ref 4–13)
BUN SERPL-MCNC: 37 MG/DL (ref 5–25)
CALCIUM SERPL-MCNC: 8.6 MG/DL (ref 8.4–10.2)
CHLORIDE SERPL-SCNC: 99 MMOL/L (ref 96–108)
CO2 SERPL-SCNC: 26 MMOL/L (ref 21–32)
CREAT SERPL-MCNC: 5.48 MG/DL (ref 0.6–1.3)
ERYTHROCYTE [DISTWIDTH] IN BLOOD BY AUTOMATED COUNT: 14.4 % (ref 11.6–15.1)
GFR SERPL CREATININE-BSD FRML MDRD: 10 ML/MIN/1.73SQ M
GLUCOSE SERPL-MCNC: 107 MG/DL (ref 65–140)
GLUCOSE SERPL-MCNC: 109 MG/DL (ref 65–140)
GLUCOSE SERPL-MCNC: 82 MG/DL (ref 65–140)
GLUCOSE SERPL-MCNC: 85 MG/DL (ref 65–140)
GLUCOSE SERPL-MCNC: 86 MG/DL (ref 65–140)
HCT VFR BLD AUTO: 24.4 % (ref 36.5–49.3)
HGB BLD-MCNC: 7.9 G/DL (ref 12–17)
MCH RBC QN AUTO: 30.3 PG (ref 26.8–34.3)
MCHC RBC AUTO-ENTMCNC: 32.4 G/DL (ref 31.4–37.4)
MCV RBC AUTO: 94 FL (ref 82–98)
PLATELET # BLD AUTO: 198 THOUSANDS/UL (ref 149–390)
PMV BLD AUTO: 10.2 FL (ref 8.9–12.7)
POTASSIUM SERPL-SCNC: 4.7 MMOL/L (ref 3.5–5.3)
RBC # BLD AUTO: 2.61 MILLION/UL (ref 3.88–5.62)
SODIUM SERPL-SCNC: 133 MMOL/L (ref 135–147)
WBC # BLD AUTO: 5.2 THOUSAND/UL (ref 4.31–10.16)

## 2024-12-01 PROCEDURE — 99232 SBSQ HOSP IP/OBS MODERATE 35: CPT | Performed by: NURSE PRACTITIONER

## 2024-12-01 PROCEDURE — 85027 COMPLETE CBC AUTOMATED: CPT | Performed by: NURSE PRACTITIONER

## 2024-12-01 PROCEDURE — 94760 N-INVAS EAR/PLS OXIMETRY 1: CPT

## 2024-12-01 PROCEDURE — 94762 N-INVAS EAR/PLS OXIMTRY CONT: CPT

## 2024-12-01 PROCEDURE — 82948 REAGENT STRIP/BLOOD GLUCOSE: CPT

## 2024-12-01 PROCEDURE — 80048 BASIC METABOLIC PNL TOTAL CA: CPT | Performed by: NURSE PRACTITIONER

## 2024-12-01 RX ORDER — NIFEDIPINE 30 MG/1
30 TABLET, EXTENDED RELEASE ORAL
Status: DISCONTINUED | OUTPATIENT
Start: 2024-12-01 | End: 2024-12-02 | Stop reason: HOSPADM

## 2024-12-01 RX ADMIN — SEVELAMER HYDROCHLORIDE 800 MG: 800 TABLET, FILM COATED ORAL at 11:56

## 2024-12-01 RX ADMIN — HEPARIN SODIUM 5000 UNITS: 5000 INJECTION, SOLUTION INTRAVENOUS; SUBCUTANEOUS at 21:04

## 2024-12-01 RX ADMIN — SEVELAMER HYDROCHLORIDE 800 MG: 800 TABLET, FILM COATED ORAL at 17:01

## 2024-12-01 RX ADMIN — ATORVASTATIN CALCIUM 80 MG: 80 TABLET, FILM COATED ORAL at 08:18

## 2024-12-01 RX ADMIN — SEVELAMER HYDROCHLORIDE 800 MG: 800 TABLET, FILM COATED ORAL at 08:18

## 2024-12-01 RX ADMIN — GABAPENTIN 200 MG: 100 CAPSULE ORAL at 08:18

## 2024-12-01 RX ADMIN — OXYCODONE HYDROCHLORIDE 5 MG: 5 TABLET ORAL at 21:45

## 2024-12-01 RX ADMIN — ALLOPURINOL 100 MG: 100 TABLET ORAL at 08:18

## 2024-12-01 RX ADMIN — GABAPENTIN 200 MG: 100 CAPSULE ORAL at 17:01

## 2024-12-01 RX ADMIN — ASPIRIN 81 MG CHEWABLE TABLET 81 MG: 81 TABLET CHEWABLE at 08:18

## 2024-12-01 RX ADMIN — NIFEDIPINE 30 MG: 30 TABLET, EXTENDED RELEASE ORAL at 17:01

## 2024-12-01 NOTE — PROGRESS NOTES
Progress Note - Hospitalist   Name: Naresh Carpio 64 y.o. male I MRN: [id number]  Unit/Bed#: [address] [place] [address] I Date of Admission: 11/19/2024   Date of Service: 12/1/2024 I Hospital Day: 12    Assessment & Plan  Acute hypoxic respiratory failure (HCC)  Patient was seen and examined in the emergency department and will be admitted to the hospital for further evaluation and management.  Patient presenting with shortness of breath found to have evidence of acute hypoxic respiratory failure. Chest x-ray showing evidence of pulmonary edema with bilateral pleural effusion secondary to volume overload from acute on chronic diastolic congestive heart failure brought on by the missed dialysis session this weekend.  Fluid management with hemodialysis per nephrology.  Continue supplemental oxygen therapy as needed and wean as tolerated.    Currently on room air.    Discussed with nephrology today, plan for dialysis again on 11/27, okay for discharge post hemodialysis.  Discussed with patient and he is agreeable at this time and will follow-up with his outpatient dialysis  11/28: Patient previously scheduled for discharge today could not be transported due to: Unavailability of transportation  CM recs: the trip is in the community - we may not be able to get this done today [facility] our [facility] available for  in [place].    11/29 Patient was nearing discharge however felt dizzy, lightheaded.  Twelve-lead EKG was performed which demonstrated sinus bradycardia rate of 53 with first-degree AV block which is new for patient.  Placed on telemetry.  Curb sided cardiology, recommend holding nifedipine and monitor     Patient has been mostly sinus rhythm in 60s, no events noted overnight.  BP improved; HR improved; resume Nifedipine at 30 mg daily   Overnight sleep screen shows AHI 25; outpatient referral to pulmonary for sleep study  Home O2 eval in am         Anemia due to chronic kidney disease, on chronic dialysis  (Formerly [facility])  Lab Results   Component Value Date    EGFR 10 12/01/2024    EGFR 5 11/30/2024    EGFR 7 11/29/2024    CREATININE 5.48 (H) 12/01/2024    CREATININE 8.83 (H) 11/30/2024    CREATININE 7.11 (H) 11/29/2024     Continue  Epogen 3000 units on HD  Hemoglobin 7.9  CBC in am   Type II diabetes mellitus (Formerly [facility])  Lab Results   Component Value Date    HGBA1C 5.5 10/08/2024       Recent Labs     11/30/24  1616 11/30/24  2012 12/01/24  0744 12/01/24  1103   POCGLU 89 96 82 107       Blood Sugar Average: Last 72 hrs:  (P) [id number]  On insulin sliding scale.    Will continue to monitor blood sugar levels and make further adjustments as needed.  Hypertension  Hold nifedipine in a.m. secondary to bradycardia/hypotension  Received 1 dose of albumin on 11/29  Resumed at lower dose 30 mg daily as normotensive  Chronic kidney disease-mineral and bone disorder  Lab Results   Component Value Date    EGFR 10 12/01/2024    EGFR 5 11/30/2024    EGFR 7 11/29/2024    CREATININE 5.48 (H) 12/01/2024    CREATININE 8.83 (H) 11/30/2024    CREATININE 7.11 (H) 11/29/2024   Dialysis days: Tuesday, Thursday, Saturday  Nephrology following, ok for discharge from their perspective, next dialysis on Tuesday outpatient   ESRD (end stage renal disease) (Formerly [facility])  Lab Results   Component Value Date    EGFR 10 12/01/2024    EGFR 5 11/30/2024    EGFR 7 11/29/2024    CREATININE 5.48 (H) 12/01/2024    CREATININE 8.83 (H) 11/30/2024    CREATININE 7.11 (H) 11/29/2024   As noted above, patient with a missed dialysis session previous weekend.    Continue hemodialysis support per recommendations from nephrology.  As noted above plan was for patient to be discharged meaning of 11/29, however developed dizziness, low BP and low heart rate  Will monitor overnight, albumin provided, on telemetry.  Hold nifedipine in a.m.  BP and HR improved   Resume nifedipine at lower dose; monitor  If stable anticipate discharge in am; discussed with patient  Paroxysmal  atrial fibrillation (HCC)  Continue aspirin 81 mg  EKG shows sinus bradycardia 53 first-degree AV block  Monitor on telemetry, SR 60s -70s    Acute on chronic diastolic (congestive) heart failure (HCC)  Wt Readings from Last 3 Encounters:   11/19/24 106 kg (234 lb)   11/07/24 106 kg (233 lb 14.5 oz)   10/22/24 97.7 kg (215 lb 6.2 oz)     Chest x-ray showing evidence of pulmonary edema with bilateral pleural effusions secondary to volume overload from acute on chronic diastolic congestive heart failure brought on by his missed dialysis session previous weekend.    Fluid management with hemodialysis per nephrology.  Improving    Pulmonary edema  Chest x-ray showing evidence of pulmonary edema with bilateral pleural effusion secondary to volume overload from acute on chronic diastolic congestive heart failure brought on by the missed dialysis session previous weekend.    Fluid management with hemodialysis per nephrology.   Patient appears euvolemic at this time  Weaned off O2  Pleural effusion  Chest x-ray showing evidence of pulmonary edema with bilateral pleural effusion secondary to volume overload from acute on chronic diastolic congestive heart failure brought on by the missed dialysis session this weekend.    Fluid management with hemodialysis per nephrology.   Plan as above  Elevated troponin  Patient with elevated troponin levels likely secondary to demand ischemia from acute on chronic diastolic congestive heart failure from end-stage renal disease after a missed dialysis session.    Fluid management with hemodialysis per nephrology.  Denies chest pain  Chronic wound  Patient with evidence of bilateral venous stasis ulcerations and history of right partial first ray and left fifth digit amputations.  Patient was evaluated by podiatry with plans for local wound care.    Arterial doppler revealed new bilateral popliteal stenosis and increase in stenosis of the right tibio-peroneal trunk  Vascular surgery  consult, appreciate input  Patient declined any surgical intervention this admission  Plan for close follow-up outpatient with vascular  Hyponatremia  Secondary to volume overload related to end-stage renal disease. Sodium 127  Nephrology following   OK for discharge from their perspective with outpatient dialysis on Tuesday    PAD (peripheral artery disease) (HCC)  LEAD 11/22  Right  50-75% stenosis proximal popliteal artery. >75 stenosis proximal tibio-peroneal trunk  SALVATORE:1.7, MTP: non-compressible. GTP 78 mmHg  Left  Greater than 75% stenosis proximal popliteal artery  SALVATORE: 1.7, MTP non compressible, GTP 50 mmHg.  Continue ASA and statin  Patient refusing intervention at this time and would like to follow up as an outpatient with vascular surgery  Local wound care per podiatry.        Bradycardia  Morning of 11/29 patient was preparing for discharge when he felt lightheaded and dizzy.  Noted to be hypotensive, heart rate 49.  Twelve-lead EKG performed which demonstrated sinus bradycardia rate of 53 with first-degree AV block which is new.  Discussed with cardiology, recommend holding nifedipine and monitor.  Remains on telemetry, sinus rhythm 60s-70s  Will resume nifedipine at 30 mg daily   Discussed with nephrology, recommend 1 dose albumin  BP improved after albumin  Continues normotensive       VTE Pharmacologic Prophylaxis:   Moderate Risk (Score 3-4) - Pharmacological DVT Prophylaxis Ordered: heparin.    Mobility:   Basic Mobility Inpatient Raw Score: 23  JH-HLM Goal: 7: Walk 25 feet or more  JH-HLM Achieved: 7: Walk 25 feet or more  JH-HLM Goal achieved. Continue to encourage appropriate mobility.    Patient Centered Rounds: I performed bedside rounds with nursing staff today.   Discussions with Specialists or Other Care Team Provider: nursing     Education and Discussions with Family / Patient:  Patient indicated he would call family .     Current Length of Stay: 12 day(s)  Current Patient Status:  Inpatient   Certification Statement: The patient will continue to require additional inpatient hospital stay due to resume BP med, monitor HR and BP, home O2 eval prior to  Discharge Plan: Anticipate discharge tomorrow to home.    Code Status: Level 1 - Full Code    Subjective   Patient seen sitting up in bed, appears tired.  Reports he did not sleep very well last night.  Did do the overnight sleep screen.  Fair appetite, no nausea or vomiting.  No further dizziness or lightheadedness at this time.  Encouraged patient to get up and move around the room to see how he does with activity with the hopes of discharging him in the a.m. patient is agreeable.    Objective :  Temp:  [97.1 °F (36.2 °C)-98.4 °F (36.9 °C)] 98.4 °F (36.9 °C)  HR:  [57-71] 61  BP: (130-159)/(53-65) 142/57  Resp:  [16-18] 16  SpO2:  [86 %-99 %] 90 %  O2 Device: Nasal cannula  Nasal Cannula O2 Flow Rate (L/min):  [2 L/min] 2 L/min    Body mass index is 31.74 kg/m².     Input and Output Summary (last 24 hours):     Intake/Output Summary (Last 24 hours) at 12/1/2024 1342  Last data filed at 11/30/2024 1900  Gross per 24 hour   Intake 680 ml   Output 3355 ml   Net -2675 ml       Physical Exam  Vitals and nursing note reviewed.   Constitutional:       Appearance: Normal appearance.   HENT:      Head: Normocephalic.      Nose: Nose normal.      Mouth/Throat:      Mouth: Mucous membranes are moist.   Eyes:      Extraocular Movements: Extraocular movements intact.      Conjunctiva/sclera: Conjunctivae normal.      Pupils: Pupils are equal, round, and reactive to light.   Cardiovascular:      Rate and Rhythm: Normal rate and regular rhythm.      Pulses: Normal pulses.      Heart sounds: Normal heart sounds.   Pulmonary:      Effort: Pulmonary effort is normal.      Breath sounds: Normal breath sounds.   Abdominal:      General: Bowel sounds are normal. There is no distension.      Palpations: Abdomen is soft.      Tenderness: There is no abdominal  tenderness.   Musculoskeletal:         General: Normal range of motion.      Cervical back: Normal range of motion.   Skin:     General: Skin is warm and dry.      Capillary Refill: Capillary refill takes less than 2 seconds.      Comments: Bilateral LE dressings noted    Neurological:      General: No focal deficit present.      Mental Status: He is alert and oriented to person, place, and time.   Psychiatric:         Mood and Affect: Mood normal.         Behavior: Behavior normal.         Thought Content: Thought content normal.         Judgment: Judgment normal.           Lines/Drains:  Lines/Drains/Airways       Active Status       Name Placement date Placement time Site Days    HD Permanent Double Catheter --  --  Internal jugular  --                      Telemetry:  Telemetry Orders (From admission, onward)               24 Hour Telemetry Monitoring  Continuous x 24 Hours (Telem)        Question:  Reason for 24 Hour Telemetry  Answer:  Arrhythmias requiring acute medical intervention / PPM or ICD malfunction                     Telemetry Reviewed: Normal Sinus Rhythm  Indication for Continued Telemetry Use: Arrthymias requiring medical therapy               Lab Results: I have reviewed the following results:   Results from last 7 days   Lab Units 12/01/24  0501 11/29/24 0919 11/28/24  0606   WBC Thousand/uL 5.20   < > 5.91   HEMOGLOBIN g/dL 7.9*   < > 8.1*   HEMATOCRIT % 24.4*   < > 24.9*   PLATELETS Thousands/uL 198   < > 199   SEGS PCT %  --   --  52   LYMPHO PCT %  --   --  26   MONO PCT %  --   --  12   EOS PCT %  --   --  9*    < > = values in this interval not displayed.     Results from last 7 days   Lab Units 12/01/24  0501 11/29/24 0919 11/28/24  0606   SODIUM mmol/L 133*   < > 128*   POTASSIUM mmol/L 4.7   < > 4.6   CHLORIDE mmol/L 99   < > 95*   CO2 mmol/L 26   < > 26   BUN mg/dL 37*   < > 32*   CREATININE mg/dL 5.48*   < > 4.96*   ANION GAP mmol/L 8   < > 7   CALCIUM mg/dL 8.6   < > 8.8   ALBUMIN  g/dL  --   --  3.6   TOTAL BILIRUBIN mg/dL  --   --  0.34   ALK PHOS U/L  --   --  84   ALT U/L  --   --  9   AST U/L  --   --  12*   GLUCOSE RANDOM mg/dL 86   < > 89    < > = values in this interval not displayed.         Results from last 7 days   Lab Units 12/01/24  1103 12/01/24  0744 11/30/24 2012 11/30/24  1616 11/30/24  1615 11/30/24  1132 11/30/24  0713 11/29/24 2047 11/29/24  1610 11/29/24  1208 11/29/24  0727 11/28/24 2049   POC GLUCOSE mg/dl 107 82 96 89 94 94 99 100 106 104 103 106               Recent Cultures (last 7 days):         Imaging Results Review: No pertinent imaging studies reviewed.  Other Study Results Review: No additional pertinent studies reviewed.    Last 24 Hours Medication List:     Current Facility-Administered Medications:     acetaminophen (TYLENOL) tablet 650 mg, Q6H PRN    allopurinol (ZYLOPRIM) tablet 100 mg, Daily    aspirin chewable tablet 81 mg, Daily    atorvastatin (LIPITOR) tablet 80 mg, Daily    benzonatate (TESSALON PERLES) capsule 100 mg, TID PRN    calcium carbonate (TUMS) chewable tablet 750 mg, BID PRN    epoetin jessica (EPOGEN,PROCRIT) injection 3,000 Units, After Dialysis    gabapentin (NEURONTIN) capsule 200 mg, BID    heparin (porcine) subcutaneous injection 5,000 Units, Q12H KEMLA    hydrOXYzine HCL (ATARAX) tablet 25 mg, Q6H PRN    insulin lispro (HumALOG/ADMELOG) 100 units/mL subcutaneous injection 1-6 Units, TID AC **AND** Fingerstick Glucose (POCT), TID AC    insulin lispro (HumALOG/ADMELOG) 100 units/mL subcutaneous injection 1-6 Units, HS    melatonin tablet 3 mg, HS PRN    NIFEdipine (PROCARDIA XL) 24 hr tablet 30 mg, After Dinner    oxyCODONE (ROXICODONE) IR tablet 5 mg, Q8H PRN    oxyCODONE (ROXICODONE) split tablet 2.5 mg, Q8H PRN    polyethylene glycol (MIRALAX) packet 17 g, Daily PRN    sevelamer (RENAGEL) tablet 800 mg, TID With Meals    Administrative Statements   Today, Patient Was Seen By: GRANT Valdivia  I have spent a total time  of greater than 45 minutes in caring for this patient on the day of the visit/encounter including Diagnostic results, Counseling / Coordination of care, Documenting in the medical record, Reviewing / ordering tests, medicine, procedures  , and Communicating with other healthcare professionals .    **Please Note: This note may have been constructed using a voice recognition system.**

## 2024-12-01 NOTE — ASSESSMENT & PLAN NOTE
Continue aspirin 81 mg  EKG shows sinus bradycardia 53 first-degree AV block  Monitor on telemetry, SR 60s -70s

## 2024-12-01 NOTE — PLAN OF CARE
Problem: RESPIRATORY - ADULT  Goal: Achieves optimal ventilation and oxygenation  Description: INTERVENTIONS:  - Assess for changes in respiratory status  - Assess for changes in mentation and behavior  - Position to facilitate oxygenation and minimize respiratory effort  - Oxygen administered by appropriate delivery if ordered  - Initiate smoking cessation education as indicated  - Encourage broncho-pulmonary hygiene including cough, deep breathe, Incentive Spirometry  - Assess the need for suctioning and aspirate as needed  - Assess and instruct to report SOB or any respiratory difficulty  - Respiratory Therapy support as indicated  Outcome: Progressing     Problem: METABOLIC, FLUID AND ELECTROLYTES - ADULT  Goal: Electrolytes maintained within normal limits  Description: INTERVENTIONS:  - Monitor labs and assess patient for signs and symptoms of electrolyte imbalances  - Administer electrolyte replacement as ordered  - Monitor response to electrolyte replacements, including repeat lab results as appropriate  - Instruct patient on fluid and nutrition as appropriate  Outcome: Progressing  Goal: Fluid balance maintained  Description: INTERVENTIONS:  - Monitor labs   - Monitor I/O and WT  - Instruct patient on fluid and nutrition as appropriate  - Assess for signs & symptoms of volume excess or deficit  Outcome: Progressing     Problem: Prexisting or High Potential for Compromised Skin Integrity  Goal: Skin integrity is maintained or improved  Description: INTERVENTIONS:  - Identify patients at risk for skin breakdown  - Assess and monitor skin integrity  - Assess and monitor nutrition and hydration status  - Monitor labs   - Assess for incontinence   - Turn and reposition patient  - Assist with mobility/ambulation  - Relieve pressure over bony prominences  - Avoid friction and shearing  - Provide appropriate hygiene as needed including keeping skin clean and dry  - Evaluate need for skin moisturizer/barrier  cream  - Collaborate with interdisciplinary team   - Patient/family teaching  - Consider wound care consult   Outcome: Progressing

## 2024-12-01 NOTE — ASSESSMENT & PLAN NOTE
Morning of 11/29 patient was preparing for discharge when he felt lightheaded and dizzy.  Noted to be hypotensive, heart rate 49.  Twelve-lead EKG performed which demonstrated sinus bradycardia rate of 53 with first-degree AV block which is new.  Discussed with cardiology, recommend holding nifedipine and monitor.  Remains on telemetry, sinus rhythm 60s-70s  Will resume nifedipine at 30 mg daily   Discussed with nephrology, recommend 1 dose albumin  BP improved after albumin  Continues normotensive

## 2024-12-01 NOTE — ASSESSMENT & PLAN NOTE
Lab Results   Component Value Date    EGFR 10 12/01/2024    EGFR 5 11/30/2024    EGFR 7 11/29/2024    CREATININE 5.48 (H) 12/01/2024    CREATININE 8.83 (H) 11/30/2024    CREATININE 7.11 (H) 11/29/2024   As noted above, patient with a missed dialysis session previous weekend.    Continue hemodialysis support per recommendations from nephrology.  As noted above plan was for patient to be discharged meaning of 11/29, however developed dizziness, low BP and low heart rate  Will monitor overnight, albumin provided, on telemetry.  Hold nifedipine in a.m.  BP and HR improved   Resume nifedipine at lower dose; monitor  If stable anticipate discharge in am; discussed with patient

## 2024-12-01 NOTE — ASSESSMENT & PLAN NOTE
Secondary to volume overload related to end-stage renal disease. Sodium 127  Nephrology following   OK for discharge from their perspective with outpatient dialysis on Tuesday

## 2024-12-01 NOTE — ASSESSMENT & PLAN NOTE
Hold nifedipine in a.m. secondary to bradycardia/hypotension  Received 1 dose of albumin on 11/29  Resumed at lower dose 30 mg daily as normotensive

## 2024-12-01 NOTE — ASSESSMENT & PLAN NOTE
Lab Results   Component Value Date    EGFR 10 12/01/2024    EGFR 5 11/30/2024    EGFR 7 11/29/2024    CREATININE 5.48 (H) 12/01/2024    CREATININE 8.83 (H) 11/30/2024    CREATININE 7.11 (H) 11/29/2024     Continue  Epogen 3000 units on HD  Hemoglobin 7.9  CBC in am

## 2024-12-01 NOTE — QUICK NOTE
Patient had dialysis yesterday, well-tolerated.  Stable for discharge from my end.  Next dialysis Tuesday      Joselyn Reyes Bahamonde, MD  Nephrology Attending

## 2024-12-01 NOTE — ASSESSMENT & PLAN NOTE
Lab Results   Component Value Date    EGFR 10 12/01/2024    EGFR 5 11/30/2024    EGFR 7 11/29/2024    CREATININE 5.48 (H) 12/01/2024    CREATININE 8.83 (H) 11/30/2024    CREATININE 7.11 (H) 11/29/2024   Dialysis days: Tuesday, Thursday, Saturday  Nephrology following, ok for discharge from their perspective, next dialysis on Tuesday outpatient

## 2024-12-01 NOTE — ASSESSMENT & PLAN NOTE
Lab Results   Component Value Date    HGBA1C 5.5 10/08/2024       Recent Labs     11/30/24  1616 11/30/24  2012 12/01/24  0744 12/01/24  1103   POCGLU 89 96 82 107       Blood Sugar Average: Last 72 hrs:  (P) [id number]  On insulin sliding scale.    Will continue to monitor blood sugar levels and make further adjustments as needed.

## 2024-12-01 NOTE — ASSESSMENT & PLAN NOTE
Patient was seen and examined in the emergency department and will be admitted to the hospital for further evaluation and management.  Patient presenting with shortness of breath found to have evidence of acute hypoxic respiratory failure. Chest x-ray showing evidence of pulmonary edema with bilateral pleural effusion secondary to volume overload from acute on chronic diastolic congestive heart failure brought on by the missed dialysis session this weekend.  Fluid management with hemodialysis per nephrology.  Continue supplemental oxygen therapy as needed and wean as tolerated.    Currently on room air.    Discussed with nephrology today, plan for dialysis again on 11/27, okay for discharge post hemodialysis.  Discussed with patient and he is agreeable at this time and will follow-up with his outpatient dialysis  11/28: Patient previously scheduled for discharge today could not be transported due to: Unavailability of transportation  CM recs: the trip is in the community - we may not be able to get this done today as [facility] is our only community partner available for  in [place].    11/29 Patient was nearing discharge however felt dizzy, lightheaded.  Twelve-lead EKG was performed which demonstrated sinus bradycardia rate of 53 with first-degree AV block which is new for patient.  Placed on telemetry.  Curb sided cardiology, recommend holding nifedipine and monitor     Patient has been mostly sinus rhythm in 60s, no events noted overnight.  BP improved; HR improved; resume Nifedipine at 30 mg daily   Overnight sleep screen shows AHI 25; outpatient referral to pulmonary for sleep study  Home O2 eval in am

## 2024-12-02 VITALS
WEIGHT: 234 LBS | HEART RATE: 53 BPM | DIASTOLIC BLOOD PRESSURE: 40 MMHG | RESPIRATION RATE: 18 BRPM | HEIGHT: 72 IN | OXYGEN SATURATION: 97 % | BODY MASS INDEX: 31.69 KG/M2 | SYSTOLIC BLOOD PRESSURE: 113 MMHG | TEMPERATURE: 98 F

## 2024-12-02 PROBLEM — J96.01 ACUTE HYPOXIC RESPIRATORY FAILURE (HCC): Status: RESOLVED | Noted: 2024-11-19 | Resolved: 2024-12-02

## 2024-12-02 LAB
ANION GAP SERPL CALCULATED.3IONS-SCNC: 11 MMOL/L (ref 4–13)
BUN SERPL-MCNC: 55 MG/DL (ref 5–25)
CALCIUM SERPL-MCNC: 8.5 MG/DL (ref 8.4–10.2)
CHLORIDE SERPL-SCNC: 95 MMOL/L (ref 96–108)
CO2 SERPL-SCNC: 25 MMOL/L (ref 21–32)
CREAT SERPL-MCNC: 7.06 MG/DL (ref 0.6–1.3)
ERYTHROCYTE [DISTWIDTH] IN BLOOD BY AUTOMATED COUNT: 14.5 % (ref 11.6–15.1)
GFR SERPL CREATININE-BSD FRML MDRD: 7 ML/MIN/1.73SQ M
GLUCOSE SERPL-MCNC: 105 MG/DL (ref 65–140)
GLUCOSE SERPL-MCNC: 92 MG/DL (ref 65–140)
GLUCOSE SERPL-MCNC: 97 MG/DL (ref 65–140)
HCT VFR BLD AUTO: 25.2 % (ref 36.5–49.3)
HGB BLD-MCNC: 8.4 G/DL (ref 12–17)
MCH RBC QN AUTO: 31.2 PG (ref 26.8–34.3)
MCHC RBC AUTO-ENTMCNC: 33.3 G/DL (ref 31.4–37.4)
MCV RBC AUTO: 94 FL (ref 82–98)
PLATELET # BLD AUTO: 215 THOUSANDS/UL (ref 149–390)
PMV BLD AUTO: 9.7 FL (ref 8.9–12.7)
POTASSIUM SERPL-SCNC: 5.2 MMOL/L (ref 3.5–5.3)
RBC # BLD AUTO: 2.69 MILLION/UL (ref 3.88–5.62)
SODIUM SERPL-SCNC: 131 MMOL/L (ref 135–147)
WBC # BLD AUTO: 5.49 THOUSAND/UL (ref 4.31–10.16)

## 2024-12-02 PROCEDURE — 80048 BASIC METABOLIC PNL TOTAL CA: CPT | Performed by: NURSE PRACTITIONER

## 2024-12-02 PROCEDURE — 85027 COMPLETE CBC AUTOMATED: CPT | Performed by: NURSE PRACTITIONER

## 2024-12-02 PROCEDURE — 82948 REAGENT STRIP/BLOOD GLUCOSE: CPT

## 2024-12-02 PROCEDURE — 99232 SBSQ HOSP IP/OBS MODERATE 35: CPT | Performed by: INTERNAL MEDICINE

## 2024-12-02 PROCEDURE — 99239 HOSP IP/OBS DSCHRG MGMT >30: CPT

## 2024-12-02 RX ORDER — NIFEDIPINE 30 MG/1
30 TABLET, EXTENDED RELEASE ORAL
Start: 2024-12-02

## 2024-12-02 RX ADMIN — HEPARIN SODIUM 5000 UNITS: 5000 INJECTION, SOLUTION INTRAVENOUS; SUBCUTANEOUS at 09:52

## 2024-12-02 RX ADMIN — Medication 2.5 MG: at 00:48

## 2024-12-02 RX ADMIN — GABAPENTIN 200 MG: 100 CAPSULE ORAL at 09:52

## 2024-12-02 RX ADMIN — OXYCODONE HYDROCHLORIDE 5 MG: 5 TABLET ORAL at 14:42

## 2024-12-02 RX ADMIN — ASPIRIN 81 MG CHEWABLE TABLET 81 MG: 81 TABLET CHEWABLE at 09:52

## 2024-12-02 RX ADMIN — SEVELAMER HYDROCHLORIDE 800 MG: 800 TABLET, FILM COATED ORAL at 12:02

## 2024-12-02 RX ADMIN — ATORVASTATIN CALCIUM 80 MG: 80 TABLET, FILM COATED ORAL at 09:52

## 2024-12-02 RX ADMIN — ALLOPURINOL 100 MG: 100 TABLET ORAL at 09:52

## 2024-12-02 NOTE — CASE MANAGEMENT
Case Management Discharge Planning Note    Patient name Naresh Carpio  Location [address] [place] [address] [place] [address] MRN [id number]  : 1960 Date 2024       Current Admission Date: 2024  Current Admission Diagnosis:Anemia due to chronic kidney disease, on chronic dialysis ([facility])   Patient Active Problem List    Diagnosis Date Noted Date Diagnosed    Acute cough 2024     Chronic wound 2024     Acute on chronic diastolic (congestive) heart failure ([facility]) 2024     Pulmonary edema 2024     Pleural effusion 2024     Elevated troponin 2024     Hyperphosphatemia 2024     Fall 2024     Melena 2024     Paroxysmal atrial fibrillation ([facility])      ESRD (end stage renal disease) ([facility]) 10/25/2024     Nausea 10/20/2024     Hyponatremia 10/19/2024     Chronic kidney disease-mineral and bone disorder 10/14/2024     Falls 10/08/2024     PAD (peripheral artery disease) ([facility]) 10/08/2024     Closed fracture of right pelvis ([facility]) 2024     Bilateral sacral fracture, closed ([facility]) 2024     Hyperkalemia 2024     Difficulty with speech 2024     History of amputation of hallux ([facility]) 2024     Hypertensive urgency 2024     Facial cellulitis 2024     Constipation 2023     Bradycardia 2023     Cellulitis of right foot      Polymicrobial bacterial infection 2023     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 mellitus, with necrosis of bone ([facility])      Acquired deformity of foot, right      Charcot's joint      Subacute osteomyelitis of right foot ([facility])      Open wound of right foot 2023     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 mellitus, with bone involvement without evidence of necrosis ([facility])      Diabetic ulcer of left midfoot associated with type 2 diabetes mellitus, limited to breakdown of skin ([facility])      Diabetic polyneuropathy associated with type 2 diabetes mellitus  (HCC)      Diabetes mellitus type 2 with peripheral artery disease (HCC)      History of amputation of lesser toe of left foot (HCC)      Onychomycosis      Status post fall 08/19/2023     Traumatic rhabdomyolysis (HCC) 08/19/2023     Leukocytosis 08/19/2023     Osteoarthritis of left hip 07/27/2022     Severe Left Orchalgia 07/26/2022     Right shoulder pain 07/26/2022     Left hip pain 07/06/2022     Hemodialysis status (HCC)      Hypervolemia      Anemia due to chronic kidney disease, on chronic dialysis (HCC) 01/21/2022     Type II diabetes mellitus (HCC)      Hypertension      History of TIA (transient ischemic attack)      T10 vertebral fracture (HCC)        LOS (days): 13  Geometric Mean LOS (GMLOS) (days): 4.4  Days to GMLOS:-8.7     OBJECTIVE:  Risk of Unplanned Readmission Score: 38         Current admission status: Inpatient   Preferred Pharmacy:   [id number] - [facility] [address] [place] [address]  [address] [address]  Phone: [phone] Fax: [phone]    Primary Care Provider: Jeffrey Jackson    Primary Insurance: MEDICARE  Secondary Insurance:     DISCHARGE DETAILS:    Discharge planning discussed with:: Patient    Other Referral/Resources/Interventions Provided:  Financial Resources Provided: Prescription Discount Card  Referral Comments: 30 day free trial coupon for Eliquis applied at patient's preferred pharmacy. Patient instructed to contact Medicare to enroll in Part D plan while enrollment is still open.     Treatment Team Recommendation: Home  Discharge Destination Plan:: Home  Transport at Discharge : Wheelchair van     Number/Name of Dispatcher: [facility]  Transported by (Company and Unit #): [employer]  ETA of Transport (Date): 12/02/24  ETA of Transport (Time): 8170

## 2024-12-02 NOTE — NURSING NOTE
AVS printed and reviewed with the patient at the bedside. Opportunity for questions was provided. The patient is discharged home in stable condition, transported by [employer]  by wheelchair.

## 2024-12-02 NOTE — ASSESSMENT & PLAN NOTE
Noted to be hypotensive and bradycardic to 40 on morning of 11/29  EKG showed sinus bradycardia HR 53 with first degree AV block  Prior provider discussed with cardiology and recommended to hold nifedipine  Heart rates improved, resumed nifedipine at 30 mg nightly  On telemetry HR ranging from 55-70, asymptomatic

## 2024-12-02 NOTE — ASSESSMENT & PLAN NOTE
Patient with elevated troponin levels likely secondary to demand ischemia from acute on chronic diastolic congestive heart failure from end-stage renal disease after a missed dialysis session  Fluid management with hemodialysis per nephrology  Denies chest pain

## 2024-12-02 NOTE — PLAN OF CARE
Problem: RESPIRATORY - ADULT  Goal: Achieves optimal ventilation and oxygenation  Description: INTERVENTIONS:  - Assess for changes in respiratory status  - Assess for changes in mentation and behavior  - Position to facilitate oxygenation and minimize respiratory effort  - Oxygen administered by appropriate delivery if ordered  - Initiate smoking cessation education as indicated  - Encourage broncho-pulmonary hygiene including cough, deep breathe, Incentive Spirometry  - Assess the need for suctioning and aspirate as needed  - Assess and instruct to report SOB or any respiratory difficulty  - Respiratory Therapy support as indicated  12/2/2024 1417 by Ana Laura Mcpherson RN  Outcome: Progressing    Problem: GENITOURINARY - ADULT  Goal: Maintains or returns to baseline urinary function  Description: INTERVENTIONS:  - Assess urinary function  - Encourage oral fluids to ensure adequate hydration if ordered  - Administer IV fluids as ordered to ensure adequate hydration  - Administer ordered medications as needed  - Offer frequent toileting  - Follow urinary retention protocol if ordered  12/2/2024 1417 by Ana Laura Mcpherson RN  Outcome: Progressing     Problem: GENITOURINARY - ADULT  Goal: Absence of urinary retention  Description: INTERVENTIONS:  - Assess patient’s ability to void and empty bladder  - Monitor I/O  - Bladder scan as needed  - Discuss with physician/AP medications to alleviate retention as needed  - Discuss catheterization for long term situations as appropriate  12/2/2024 1417 by Ana Laura Mcpherson RN  Outcome: Progressing    Problem: METABOLIC, FLUID AND ELECTROLYTES - ADULT  Goal: Electrolytes maintained within normal limits  Description: INTERVENTIONS:  - Monitor labs and assess patient for signs and symptoms of electrolyte imbalances  - Administer electrolyte replacement as ordered  - Monitor response to electrolyte replacements, including repeat lab results as appropriate  - Instruct patient on  fluid and nutrition as appropriate  12/2/2024 1417 by Ana Laura Mcpherson RN  Outcome: Progressing  12/2/2024 1416 by Ana Laura Mcpherson RN  Outcome: Progressing  Goal: Fluid balance maintained  Description: INTERVENTIONS:  - Monitor labs   - Monitor I/O and WT  - Instruct patient on fluid and nutrition as appropriate  - Assess for signs & symptoms of volume excess or deficit  12/2/2024 1417 by Ana Laura Mcpherson RN  Outcome: Progressing    Problem: Prexisting or High Potential for Compromised Skin Integrity  Goal: Skin integrity is maintained or improved  Description: INTERVENTIONS:  - Identify patients at risk for skin breakdown  - Assess and monitor skin integrity  - Assess and monitor nutrition and hydration status  - Monitor labs   - Assess for incontinence   - Turn and reposition patient  - Assist with mobility/ambulation  - Relieve pressure over bony prominences  - Avoid friction and shearing  - Provide appropriate hygiene as needed including keeping skin clean and dry  - Evaluate need for skin moisturizer/barrier cream  - Collaborate with interdisciplinary team   - Patient/family teaching  - Consider wound care consult   12/2/2024 1417 by Ana Laura Mcpherson RN  Outcome: Progressing

## 2024-12-02 NOTE — ASSESSMENT & PLAN NOTE
Per chart review, appears patient was previously taking Eliquis but it is not listed in PTA medications  Patient states a provider did not tell him to stop taking but he ran out and has never followed up with cardiology  He was noted to be in atrial fibrillation as recently as last admission last month  Will resend Eliquis 5 mg bid and recommend follow up with cardiology  Eliquis coupon provided by case management, instructed patient he will need close follow-up with cardiology

## 2024-12-02 NOTE — ASSESSMENT & PLAN NOTE
Lab Results   Component Value Date    EGFR 7 12/02/2024    EGFR 10 12/01/2024    EGFR 5 11/30/2024    CREATININE 7.06 (H) 12/02/2024    CREATININE 5.48 (H) 12/01/2024    CREATININE 8.83 (H) 11/30/2024   Dialysis TTS  Nephrology following, stable for discharge with dialysis tomorrow on Tuesday

## 2024-12-02 NOTE — ASSESSMENT & PLAN NOTE
Slight bradycardia not actively symptomatic  EKG with first-degree AV block  Follow-up with cardiology

## 2024-12-02 NOTE — ASSESSMENT & PLAN NOTE
Patient presented with shortness of breath after missing dialysis session.  CXR: Pulmonary edema with bilateral pleural effusions.  In setting of chronic heart failure an missed dialysis  Nephrology following, resumed on TTS dialysis schedule  Home O2 eval was done 11/27 showing patient required 1 L oxygen  Patient refusing home oxygen  Currently stable on room air

## 2024-12-02 NOTE — ASSESSMENT & PLAN NOTE
Volume: Euvolemic  Blood pressure: Blood pressure stable with a wide pulse pressure.  MAP slightly below 65  UF on HD  Nifedipine 60 mg daily   If blood pressure becomes too low can reduce nifedipine.

## 2024-12-02 NOTE — ASSESSMENT & PLAN NOTE
Patient with BLE wounds and chronic venous stasis  LEADs 11/22 showed new bilateral popliteal stenosis, and increase in stenosis of the right tibio-peroneal trunk  Vascular surgery was consulted  Patient declining intervention and would like to discuss options outpatient  Continue aspirin and statin  Continue local wound care  Follow up with vascular outpatient

## 2024-12-02 NOTE — ASSESSMENT & PLAN NOTE
Secondary to volume overload related to end-stage renal disease  Nephrology following   Stable for discharge with dialysis on Tuesday

## 2024-12-02 NOTE — ASSESSMENT & PLAN NOTE
Current hemoglobin: Hemoglobin remained stable at 8.4  Etiology secondary to  ESRD  Treatment:  Continue Epogen with dialysis.  Transfuse for hemoglobin less than 7.0 per primary service

## 2024-12-02 NOTE — DISCHARGE SUMMARY
Discharge Summary - Hospitalist   Name: Naresh Carpio 64 y.o. male I MRN: [id number]  Unit/Bed#: [address] [place] [address] Date of Admission: 11/19/2024   Date of Service: 12/2/2024 I Hospital Day: 13     Assessment & Plan  Acute hypoxic respiratory failure (HCC) (Resolved: 12/2/2024)  Patient presented with shortness of breath after missing dialysis session.  CXR: Pulmonary edema with bilateral pleural effusions.  In setting of chronic heart failure an missed dialysis  Nephrology following, resumed on TTS dialysis schedule  Home O2 eval was done 11/27 showing patient required 1 L oxygen  Patient refusing home oxygen  Currently stable on room air  ESRD (end stage renal disease) ([facility])  Lab Results   Component Value Date    EGFR 7 12/02/2024    EGFR 10 12/01/2024    EGFR 5 11/30/2024    CREATININE 7.06 (H) 12/02/2024    CREATININE 5.48 (H) 12/01/2024    CREATININE 8.83 (H) 11/30/2024   Dialysis TTS  Nephrology following, stable for discharge with dialysis tomorrow on Tuesday  PAD (peripheral artery disease) ([facility])  Patient with BLE wounds and chronic venous stasis  LEADs 11/22 showed new bilateral popliteal stenosis, and increase in stenosis of the right tibio-peroneal trunk  Vascular surgery was consulted  Patient declining intervention and would like to discuss options outpatient  Continue aspirin and statin  Continue local wound care  Follow up with vascular outpatient  Paroxysmal atrial fibrillation ([facility])  Per chart review, appears patient was previously taking Eliquis but it is not listed in PTA medications  Patient states a provider did not tell him to stop taking but he ran out and has never followed up with cardiology  He was noted to be in atrial fibrillation as recently as last admission last month  Will resend Eliquis 5 mg bid and recommend follow up with cardiology  Eliquis gonzálezpon provided by case management, instructed patient he will need close follow-up with cardiology  Bradycardia  Noted to be hypotensive and  bradycardic to 40 on morning of 11/29  EKG showed sinus bradycardia HR 53 with first degree AV block  Prior provider discussed with cardiology and recommended to hold nifedipine  Heart rates improved, resumed nifedipine at 30 mg nightly  On telemetry HR ranging from 55-70, asymptomatic  Acute on chronic diastolic (congestive) heart failure (HCC)  Wt Readings from Last 3 Encounters:   11/19/24 106 kg (234 lb)   11/07/24 106 kg (233 lb 14.5 oz)   10/22/24 97.7 kg (215 lb 6.2 oz)     Chest x-ray showing evidence of pulmonary edema with bilateral pleural effusions secondary to volume overload from acute on chronic diastolic congestive heart failure brought on by his missed dialysis session previous weekend  Fluid management with hemodialysis per nephrology  Anemia due to chronic kidney disease, on chronic dialysis ([facility])  Continue  Epogen 3000 units on HD  Hemoglobin currently stable at baseline  Type II diabetes mellitus ([facility])  Lab Results   Component Value Date    HGBA1C 5.5 10/08/2024   Glucose stable off of insulin or diabetic medications  Hypertension  Nifedipine decreased to 30 mg daily  BP stable  Chronic kidney disease-mineral and bone disorder  Continue sevelamer 800 mg tid with meals per nephro  Elevated troponin  Patient with elevated troponin levels likely secondary to demand ischemia from acute on chronic diastolic congestive heart failure from end-stage renal disease after a missed dialysis session  Fluid management with hemodialysis per nephrology  Denies chest pain  Chronic wound  Patient with evidence of bilateral venous stasis ulcerations and history of right partial first ray and left fifth digit amputations.  Patient was evaluated by podiatry with plans for local wound care.    Arterial doppler revealed new bilateral popliteal stenosis and increase in stenosis of the right tibio-peroneal trunk  Vascular surgery consult  Patient declined any surgical intervention this admission  Plan for close  follow-up outpatient with vascular  Hyponatremia  Secondary to volume overload related to end-stage renal disease  Nephrology following   Stable for discharge with dialysis on Tuesday     Medical Problems       Resolved Problems  Date Reviewed: 12/2/2024          Resolved    * (Principal) Acute hypoxic respiratory failure (HCC) 12/2/2024     Resolved by  Nallely Pisano PA-C        Discharging Physician / Practitioner: Nallely Pisano PA-C  PCP: Jeffrey Jackson  Admission Date:   Admission Orders (From admission, onward)       Ordered        11/19/24 1115  INPATIENT ADMISSION  Once                          Discharge Date: 12/02/24    Consultations During Hospital Stay:  Nephrology  Vascular surgery  Podiatry    Procedures Performed:   none    Significant Findings / Test Results:   CXR: Pulmonary edema with bilateral pleural effusions.   VAS arterial duplex - lower limb bilateral: RIGHT LOWER LIMB: Evidence of 50-75% stenosis in the proximal popliteal artery. Evidence of >75% stenosis at the proximal tibio-peroneal trunk (Prior <50%). Ankle/Brachial index: 1.7 , which unreliable due to non-compressible vessels (Prior non-compressible). Metatarsal pressure non-compressible. Great toe pressure of 78 mmHg, within the normal healing range. PVR/ PPG tracings are dampened. LEFT LOWER LIMB: Evidence of >75% stenosis in the proximal popliteal artery. Ankle/Brachial index: 1.7, which is unreliable due to non-compressible vessels(Prior non-compressible). Metatarsal pressure non-compressible. Great toe pressure of 50 mmHg, within the normal healing range. PVR/ PPG tracings are dampened.    Incidental Findings:   none    Test Results Pending at Discharge (will require follow up):   none     Outpatient Tests Requested:  none    Complications: none    Reason for Admission: acute respiratory failure    Hospital Course:   Naresh Carpio is a 64 y.o. male patient who originally presented to the hospital on 11/19/2024 due to shortness of  breath.  He reports he missed his scheduled dialysis session on the Saturday prior to admission due to back pain.  Chest x-ray performed in the ED showed evidence of pulmonary edema and bilateral pleural effusions and patient was requiring supplemental oxygen.  Patient underwent ultrafiltration on hemodialysis on 11/19, 11/20, and 11/21 and resumed on regular TTS schedule. Respiratory failure has improved. Home O2 eval done on 11/27 showed patient required 1 L oxygen, however, patient refused. Encouraged compliance with dialysis sessions outpatient. Patient currently stable on room air and cleared by nephrology prior to discharge.    Please see above list of diagnoses and related plan for additional information.     Condition at Discharge: stable    Discharge Day Visit / Exam:   Subjective:  Patient reports he is feeling well today. He denies current symptoms including dizziness, headache, chest pain, shortness of breath. He has been ambulating without issue. Agreeable for discharge today. Encouraged compliance with outpatient dialysis.  Vitals: Blood Pressure: (!) 113/40 (12/02/24 0948)  Pulse: (!) 53 (12/02/24 0948)  Temperature: 98 °F (36.7 °C) (12/02/24 0948)  Temp Source: Oral (12/02/24 0948)  Respirations: 18 (12/02/24 0948)  Height: 6' (182.9 cm) (11/19/24 1303)  Weight - Scale: 106 kg (234 lb) (11/19/24 1303)  SpO2: 97 % (12/02/24 0948)  Physical Exam  Vitals and nursing note reviewed.   Constitutional:       General: He is not in acute distress.     Appearance: He is well-developed.   Cardiovascular:      Rate and Rhythm: Normal rate and regular rhythm.      Heart sounds: Normal heart sounds.   Pulmonary:      Effort: Pulmonary effort is normal. No respiratory distress.      Breath sounds: Normal breath sounds.   Abdominal:      Tenderness: There is no abdominal tenderness.   Musculoskeletal:         General: No swelling.   Skin:     General: Skin is warm and dry.   Neurological:      Mental Status: He is  alert.   Psychiatric:         Mood and Affect: Mood normal.        Discussion with Family: Patient declined call to .     Discharge instructions/Information to patient and family:   See after visit summary for information provided to patient and family.      Provisions for Follow-Up Care:  See after visit summary for information related to follow-up care and any pertinent home health orders.      Mobility at time of Discharge:   Basic Mobility Inpatient Raw Score: 23  JH-HLM Goal: 7: Walk 25 feet or more  JH-HLM Achieved: 7: Walk 25 feet or more  HLM Goal achieved. Continue to encourage appropriate mobility.     Disposition:   Home    Planned Readmission: none though high risk for readmission due to noncompliance with dialysis sessions    Discharge Medications:  See after visit summary for reconciled discharge medications provided to patient and/or family.      Administrative Statements   Discharge Statement:  I have spent a total time of 60 minutes in caring for this patient on the day of the visit/encounter. >30 minutes of time was spent on: Diagnostic results, Risks and benefits of tx options, Instructions for management, Patient and family education, Importance of tx compliance, Risk factor reductions, Impressions, Counseling / Coordination of care, Documenting in the medical record, Reviewing / ordering tests, medicine, procedures  , and Communicating with other healthcare professionals .    **Please Note: This note may have been constructed using a voice recognition system**

## 2024-12-02 NOTE — ASSESSMENT & PLAN NOTE
Wt Readings from Last 3 Encounters:   11/19/24 106 kg (234 lb)   11/07/24 106 kg (233 lb 14.5 oz)   10/22/24 97.7 kg (215 lb 6.2 oz)     Chest x-ray showing evidence of pulmonary edema with bilateral pleural effusions secondary to volume overload from acute on chronic diastolic congestive heart failure brought on by his missed dialysis session previous weekend  Fluid management with hemodialysis per nephrology

## 2024-12-02 NOTE — PLAN OF CARE
Problem: RESPIRATORY - ADULT  Goal: Achieves optimal ventilation and oxygenation  Description: INTERVENTIONS:  - Assess for changes in respiratory status  - Assess for changes in mentation and behavior  - Position to facilitate oxygenation and minimize respiratory effort  - Oxygen administered by appropriate delivery if ordered  - Initiate smoking cessation education as indicated  - Encourage broncho-pulmonary hygiene including cough, deep breathe, Incentive Spirometry  - Assess the need for suctioning and aspirate as needed  - Assess and instruct to report SOB or any respiratory difficulty  - Respiratory Therapy support as indicated  12/1/2024 2022 by Amara Venegas RN  Outcome: Progressing  12/1/2024 1100 by Amara Venegas RN  Outcome: Progressing     Problem: GENITOURINARY - ADULT  Goal: Maintains or returns to baseline urinary function  Description: INTERVENTIONS:  - Assess urinary function  - Encourage oral fluids to ensure adequate hydration if ordered  - Administer IV fluids as ordered to ensure adequate hydration  - Administer ordered medications as needed  - Offer frequent toileting  - Follow urinary retention protocol if ordered  12/1/2024 2022 by Amara Venegas RN  Outcome: Progressing  12/1/2024 1100 by Amara Venegas RN  Outcome: Progressing  Goal: Absence of urinary retention  Description: INTERVENTIONS:  - Assess patient’s ability to void and empty bladder  - Monitor I/O  - Bladder scan as needed  - Discuss with physician/AP medications to alleviate retention as needed  - Discuss catheterization for long term situations as appropriate  12/1/2024 2022 by Amara Venegas RN  Outcome: Progressing  12/1/2024 1100 by Amara Venegas RN  Outcome: Progressing  Goal: Urinary catheter remains patent  Description: INTERVENTIONS:  - Assess patency of urinary catheter  - If patient has a chronic gaitan, consider changing catheter if non-functioning  - Follow guidelines for intermittent irrigation of non-functioning urinary  catheter  12/1/2024 2022 by Amara Venegas RN  Outcome: Progressing  12/1/2024 1100 by Amara Venegas RN  Outcome: Progressing     Problem: METABOLIC, FLUID AND ELECTROLYTES - ADULT  Goal: Electrolytes maintained within normal limits  Description: INTERVENTIONS:  - Monitor labs and assess patient for signs and symptoms of electrolyte imbalances  - Administer electrolyte replacement as ordered  - Monitor response to electrolyte replacements, including repeat lab results as appropriate  - Instruct patient on fluid and nutrition as appropriate  12/1/2024 2022 by Amara Venegas RN  Outcome: Progressing  12/1/2024 1100 by Amara Venegas RN  Outcome: Progressing  Goal: Fluid balance maintained  Description: INTERVENTIONS:  - Monitor labs   - Monitor I/O and WT  - Instruct patient on fluid and nutrition as appropriate  - Assess for signs & symptoms of volume excess or deficit  12/1/2024 2022 by Amara Venegas RN  Outcome: Progressing  12/1/2024 1100 by Amara Venegas RN  Outcome: Progressing     Problem: Prexisting or High Potential for Compromised Skin Integrity  Goal: Skin integrity is maintained or improved  Description: INTERVENTIONS:  - Identify patients at risk for skin breakdown  - Assess and monitor skin integrity  - Assess and monitor nutrition and hydration status  - Monitor labs   - Assess for incontinence   - Turn and reposition patient  - Assist with mobility/ambulation  - Relieve pressure over bony prominences  - Avoid friction and shearing  - Provide appropriate hygiene as needed including keeping skin clean and dry  - Evaluate need for skin moisturizer/barrier cream  - Collaborate with interdisciplinary team   - Patient/family teaching  - Consider wound care consult   12/1/2024 2022 by Amara Venegas RN  Outcome: Progressing  12/1/2024 1100 by Amara Venegas RN  Outcome: Progressing

## 2024-12-02 NOTE — ASSESSMENT & PLAN NOTE
Lab Results   Component Value Date    HGBA1C 5.5 10/08/2024   Glucose stable off of insulin or diabetic medications

## 2024-12-02 NOTE — PROGRESS NOTES
Progress Note - Nephrology   Name: Naresh Carpio 64 y.o. male I MRN: [id number]  Unit/Bed#: [address] [place] [address] I Date of Admission: 11/19/2024   Date of Service: 12/2/2024 I Hospital Day: 13     Assessment & Plan  ESRD (end stage renal disease) (formerly [facility])  Dialysis unit/days: [facility]  Access: PermCath  Patient is TTS  Underwent hemodialysis on Saturday, November 30.   kg  Renal Diet  Fluid restriction 1-1.5L/d  Adjust medications to GFR<10  Stable from renal standpoint for discharge.  Remains in the hospital tomorrow we will plan for dialysis tomorrow December 3.    Anemia due to chronic kidney disease, on chronic dialysis (formerly [facility])    Current hemoglobin: Hemoglobin remained stable at 8.4  Etiology secondary to  ESRD  Treatment:  Continue Epogen with dialysis.  Transfuse for hemoglobin less than 7.0 per primary service  Hypertension  Volume: Euvolemic  Blood pressure: Blood pressure stable with a wide pulse pressure.  MAP slightly below 65  UF on HD  Nifedipine 60 mg daily   If blood pressure becomes too low can reduce nifedipine.  Chronic kidney disease-mineral and bone disorder  Continue sevelamer 800 mg 3 times daily with meals  Type II diabetes mellitus (HCC)  HbA1c 5.5  Advised to maintain a good DM control  Maintain healthy diet (vegetables, fruits, whole grains, nonfat or low fat)  Weight loss  Physical activity (5 to 10 minutes to start the increase to 30 min a day)  Chronic wound  PT, OT  Management as per primary team  Bradycardia  Slight bradycardia not actively symptomatic  EKG with first-degree AV block  Follow-up with cardiology  Acute on chronic diastolic (congestive) heart failure (HCC)  UF on HD  Low-sodium diet  PAD (peripheral artery disease) (formerly [facility])    Acute cough      I have reviewed the nephrology recommendations including assessment and plan, with patient, and we are in agreement with renal plan including the information outlined above. Ok for discharge from Nephrology service  perspective.    Subjective   Brief History of Admission -  63 yo man with PMH of ESRD on HD, hypertension, diabetes, chronic lower extremity wounds p/w shortness of breath. Nephrology is consulted for management of ESRD     No chest pain or shortness of breath    Objective :  Temp:  [97.6 °F (36.4 °C)-98 °F (36.7 °C)] 98 °F (36.7 °C)  HR:  [53-61] 53  BP: (113-151)/(40-61) 113/40  Resp:  [18-20] 18  SpO2:  [87 %-97 %] 97 %  O2 Device: None (Room air)    Current Weight: Weight - Scale: 106 kg (234 lb)  First Weight: Weight - Scale: 106 kg (234 lb)  I/O         11/30 0701  12/01 0700 12/01 0701 12/02 0700 12/02 0701 12/03 0700    P.O. 360 640     I.V. (mL/kg) 500 (4.7)      Total Intake(mL/kg) 860 (8.1) 640 (6)     Urine (mL/kg/hr) 650 (0.3) 500 (0.2) 125 (0.3)    Other 3005      Total Output 3655 500 125    Net -2795 +140 -125                 Physical Exam  Vitals and nursing note reviewed.   Constitutional:       General: He is not in acute distress.     Appearance: He is well-developed. He is not diaphoretic.   HENT:      Head: Normocephalic and atraumatic.   Eyes:      General: No scleral icterus.     Pupils: Pupils are equal, round, and reactive to light.   Cardiovascular:      Rate and Rhythm: Regular rhythm. Bradycardia present.      Heart sounds: Normal heart sounds. No murmur heard.     No friction rub. No gallop.   Pulmonary:      Effort: Pulmonary effort is normal. No respiratory distress.      Breath sounds: Normal breath sounds. No wheezing or rales.   Chest:      Chest wall: No tenderness.   Abdominal:      General: Bowel sounds are normal. There is no distension.      Palpations: Abdomen is soft.      Tenderness: There is no abdominal tenderness. There is no rebound.   Musculoskeletal:         General: Normal range of motion.      Cervical back: Normal range of motion and neck supple.   Skin:     Findings: No rash.   Neurological:      Mental Status: He is alert and oriented to person, place, and  time.         Medications:    Current Facility-Administered Medications:     acetaminophen (TYLENOL) tablet 650 mg, 650 mg, Oral, Q6H PRN, Milan Ruiz MD    allopurinol (ZYLOPRIM) tablet 100 mg, 100 mg, Oral, Daily, Milan Ruiz MD, 100 mg at 12/02/24 0952    aspirin chewable tablet 81 mg, 81 mg, Oral, Daily, Milan Ruiz MD, 81 mg at 12/02/24 0952    atorvastatin (LIPITOR) tablet 80 mg, 80 mg, Oral, Daily, Milan Ruiz MD, 80 mg at 12/02/24 0952    benzonatate (TESSALON PERLES) capsule 100 mg, 100 mg, Oral, TID PRN, GRANT Church    calcium carbonate (TUMS) chewable tablet 750 mg, 750 mg, Oral, BID PRN, GRANT Rich, 750 mg at 11/24/24 2003    epoetin jessica (EPOGEN,PROCRIT) injection 3,000 Units, 3,000 Units, Intravenous, After Dialysis, Joselyn Reyes Bahamonde, MD, 3,000 Units at 11/30/24 1749    gabapentin (NEURONTIN) capsule 200 mg, 200 mg, Oral, BID, Milan Ruiz MD, 200 mg at 12/02/24 0952    heparin (porcine) subcutaneous injection 5,000 Units, 5,000 Units, Subcutaneous, Q12H KEMAL, Milan Ruiz MD, 5,000 Units at 12/02/24 0952    hydrOXYzine HCL (ATARAX) tablet 25 mg, 25 mg, Oral, Q6H PRN, GRANT Church, 25 mg at 11/27/24 1809    insulin lispro (HumALOG/ADMELOG) 100 units/mL subcutaneous injection 1-6 Units, 1-6 Units, Subcutaneous, TID AC **AND** Fingerstick Glucose (POCT), , , TID AC, Milan Ruiz MD    insulin lispro (HumALOG/ADMELOG) 100 units/mL subcutaneous injection 1-6 Units, 1-6 Units, Subcutaneous, HS, Milan Ruiz MD    melatonin tablet 3 mg, 3 mg, Oral, HS PRN, GRANT Church, 3 mg at 11/22/24 2223    NIFEdipine (PROCARDIA XL) 24 hr tablet 30 mg, 30 mg, Oral, After Dinner, GRANT Valdivia, 30 mg at 12/01/24 1701    oxyCODONE (ROXICODONE) IR tablet 5 mg, 5 mg, Oral, Q8H PRN, Milan Ruiz MD, 5 mg at 12/01/24 2145    oxyCODONE (ROXICODONE) split tablet 2.5 mg, 2.5 mg, Oral, Q8H  "PRN, Milan Ruiz MD, 2.5 mg at 12/02/24 0048    polyethylene glycol (MIRALAX) packet 17 g, 17 g, Oral, Daily PRN, Milan Ruiz MD, 17 g at 11/30/24 2324    sevelamer (RENAGEL) tablet 800 mg, 800 mg, Oral, TID With Meals, German Linton MD, 800 mg at 12/01/24 1701      Lab Results: I have reviewed the following results:  Results from last 7 days   Lab Units 12/02/24  0516 12/01/24  0501 11/30/24  1500 11/29/24  0919 11/28/24  0606 11/27/24  0825 11/26/24  0514   WBC Thousand/uL 5.49 5.20 6.13 7.44 5.91 6.21 6.30   HEMOGLOBIN g/dL 8.4* 7.9* 7.9* 8.4* 8.1* 8.2* 8.0*   HEMATOCRIT % 25.2* 24.4* 24.2* 26.1* 24.9* 25.7* 24.4*   PLATELETS Thousands/uL 215 198 211 225 199 225 205   POTASSIUM mmol/L 5.2 4.7 5.8* 5.2 4.6 4.9 4.5   CHLORIDE mmol/L 95* 99 92* 92* 95* 94* 96   CO2 mmol/L 25 26 26 25 26 27 27   BUN mg/dL 55* 37* 72* 51* 32* 46* 30*   CREATININE mg/dL 7.06* 5.48* 8.83* 7.11* 4.96* 6.39* 4.48*   CALCIUM mg/dL 8.5 8.6 8.6 9.2 8.8 8.4 8.8   MAGNESIUM mg/dL  --   --  2.4  --   --   --   --    ALBUMIN g/dL  --   --   --   --  3.6  --   --        Administrative Statements   I have spent a total time of 15 minutes in caring for this patient on the day of the visit/encounter including Impressions, Counseling / Coordination of care, Documenting in the medical record, Reviewing / ordering tests, medicine, procedures  , and Obtaining or reviewing history  .  Portions of the record may have been created with voice recognition software. Occasional wrong word or \"sound a like\" substitutions may have occurred due to the inherent limitations of voice recognition software. Read the chart carefully and recognize, using context, where substitutions have occurred.If you have any questions, please contact the dictating provider.  "

## 2024-12-02 NOTE — ASSESSMENT & PLAN NOTE
Dialysis unit/days: [facility]  Access: PermCath  Patient is TTS  Underwent hemodialysis on Saturday, November 30.   kg  Renal Diet  Fluid restriction 1-1.5L/d  Adjust medications to GFR<10  Stable from renal standpoint for discharge.  Remains in the hospital tomorrow we will plan for dialysis tomorrow December 3.

## 2024-12-02 NOTE — ASSESSMENT & PLAN NOTE
Patient with evidence of bilateral venous stasis ulcerations and history of right partial first ray and left fifth digit amputations.  Patient was evaluated by podiatry with plans for local wound care.    Arterial doppler revealed new bilateral popliteal stenosis and increase in stenosis of the right tibio-peroneal trunk  Vascular surgery consult  Patient declined any surgical intervention this admission  Plan for close follow-up outpatient with vascular

## 2024-12-19 ENCOUNTER — HOSPITAL ENCOUNTER (INPATIENT)
Facility: HOSPITAL | Age: 64
LOS: 1 days | Discharge: HOME | End: 2024-12-21
Attending: EMERGENCY MEDICINE | Admitting: INTERNAL MEDICINE
Payer: MEDICARE

## 2024-12-19 ENCOUNTER — APPOINTMENT (OUTPATIENT)
Dept: DIALYSIS | Facility: HOSPITAL | Age: 64
End: 2024-12-19
Payer: MEDICARE

## 2024-12-19 ENCOUNTER — APPOINTMENT (EMERGENCY)
Dept: RADIOLOGY | Facility: HOSPITAL | Age: 64
End: 2024-12-19
Payer: MEDICARE

## 2024-12-19 DIAGNOSIS — E87.5 HYPERKALEMIA: ICD-10-CM

## 2024-12-19 DIAGNOSIS — N18.6 ESRD (END STAGE RENAL DISEASE) (HCC): ICD-10-CM

## 2024-12-19 DIAGNOSIS — R53.1 WEAKNESS: Primary | ICD-10-CM

## 2024-12-19 PROBLEM — N28.9 RENAL LESION: Status: ACTIVE | Noted: 2024-12-19

## 2024-12-19 LAB
ALBUMIN SERPL BCG-MCNC: 3.7 G/DL (ref 3.5–5)
ALP SERPL-CCNC: 113 U/L (ref 34–104)
ALT SERPL W P-5'-P-CCNC: 74 U/L (ref 7–52)
ANION GAP SERPL CALCULATED.3IONS-SCNC: 10 MMOL/L (ref 4–13)
APTT PPP: 30 SECONDS (ref 23–34)
AST SERPL W P-5'-P-CCNC: 47 U/L (ref 13–39)
ATRIAL RATE: 50 BPM
BACTERIA UR QL AUTO: NORMAL /HPF
BASOPHILS # BLD AUTO: 0.03 THOUSANDS/ÂΜL (ref 0–0.1)
BASOPHILS NFR BLD AUTO: 1 % (ref 0–1)
BILIRUB SERPL-MCNC: 0.28 MG/DL (ref 0.2–1)
BILIRUB UR QL STRIP: NEGATIVE
BUN SERPL-MCNC: 92 MG/DL (ref 5–25)
CALCIUM SERPL-MCNC: 8.3 MG/DL (ref 8.4–10.2)
CHLORIDE SERPL-SCNC: 102 MMOL/L (ref 96–108)
CLARITY UR: CLEAR
CO2 SERPL-SCNC: 22 MMOL/L (ref 21–32)
COLOR UR: COLORLESS
CREAT SERPL-MCNC: 9.32 MG/DL (ref 0.6–1.3)
EOSINOPHIL # BLD AUTO: 0.41 THOUSAND/ÂΜL (ref 0–0.61)
EOSINOPHIL NFR BLD AUTO: 6 % (ref 0–6)
ERYTHROCYTE [DISTWIDTH] IN BLOOD BY AUTOMATED COUNT: 13.9 % (ref 11.6–15.1)
FLUAV AG UPPER RESP QL IA.RAPID: NEGATIVE
FLUBV AG UPPER RESP QL IA.RAPID: NEGATIVE
GFR SERPL CREATININE-BSD FRML MDRD: 5 ML/MIN/1.73SQ M
GLUCOSE SERPL-MCNC: 128 MG/DL (ref 65–140)
GLUCOSE UR STRIP-MCNC: ABNORMAL MG/DL
HCT VFR BLD AUTO: 26.4 % (ref 36.5–49.3)
HGB BLD-MCNC: 8.1 G/DL (ref 12–17)
HGB UR QL STRIP.AUTO: ABNORMAL
IMM GRANULOCYTES # BLD AUTO: 0.03 THOUSAND/UL (ref 0–0.2)
IMM GRANULOCYTES NFR BLD AUTO: 1 % (ref 0–2)
INR PPP: 1.2 (ref 0.85–1.19)
KETONES UR STRIP-MCNC: NEGATIVE MG/DL
LEUKOCYTE ESTERASE UR QL STRIP: ABNORMAL
LIPASE SERPL-CCNC: 55 U/L (ref 11–82)
LYMPHOCYTES # BLD AUTO: 1.29 THOUSANDS/ÂΜL (ref 0.6–4.47)
LYMPHOCYTES NFR BLD AUTO: 20 % (ref 14–44)
MAGNESIUM SERPL-MCNC: 2.2 MG/DL (ref 1.9–2.7)
MCH RBC QN AUTO: 30.1 PG (ref 26.8–34.3)
MCHC RBC AUTO-ENTMCNC: 30.7 G/DL (ref 31.4–37.4)
MCV RBC AUTO: 98 FL (ref 82–98)
MONOCYTES # BLD AUTO: 0.62 THOUSAND/ÂΜL (ref 0.17–1.22)
MONOCYTES NFR BLD AUTO: 10 % (ref 4–12)
NEUTROPHILS # BLD AUTO: 3.98 THOUSANDS/ÂΜL (ref 1.85–7.62)
NEUTS SEG NFR BLD AUTO: 62 % (ref 43–75)
NITRITE UR QL STRIP: NEGATIVE
NON-SQ EPI CELLS URNS QL MICRO: NORMAL /HPF
NRBC BLD AUTO-RTO: 0 /100 WBCS
P AXIS: 26 DEGREES
PH UR STRIP.AUTO: 7.5 [PH]
PLATELET # BLD AUTO: 219 THOUSANDS/UL (ref 149–390)
PMV BLD AUTO: 9.9 FL (ref 8.9–12.7)
POTASSIUM SERPL-SCNC: 7 MMOL/L (ref 3.5–5.3)
PR INTERVAL: 174 MS
PROT SERPL-MCNC: 7.1 G/DL (ref 6.4–8.4)
PROT UR STRIP-MCNC: ABNORMAL MG/DL
PROTHROMBIN TIME: 15.7 SECONDS (ref 12.3–15)
QRS AXIS: 3 DEGREES
QRSD INTERVAL: 76 MS
QT INTERVAL: 568 MS
QTC INTERVAL: 517 MS
RBC # BLD AUTO: 2.69 MILLION/UL (ref 3.88–5.62)
RBC #/AREA URNS AUTO: NORMAL /HPF
SARS-COV+SARS-COV-2 AG RESP QL IA.RAPID: NEGATIVE
SODIUM SERPL-SCNC: 134 MMOL/L (ref 135–147)
SP GR UR STRIP.AUTO: 1.01 (ref 1–1.03)
T WAVE AXIS: 54 DEGREES
UROBILINOGEN UR STRIP-ACNC: <2 MG/DL
VENTRICULAR RATE: 50 BPM
WBC # BLD AUTO: 6.36 THOUSAND/UL (ref 4.31–10.16)
WBC #/AREA URNS AUTO: NORMAL /HPF

## 2024-12-19 PROCEDURE — 80053 COMPREHEN METABOLIC PANEL: CPT | Performed by: EMERGENCY MEDICINE

## 2024-12-19 PROCEDURE — 83690 ASSAY OF LIPASE: CPT | Performed by: EMERGENCY MEDICINE

## 2024-12-19 PROCEDURE — 93010 ELECTROCARDIOGRAM REPORT: CPT | Performed by: INTERNAL MEDICINE

## 2024-12-19 PROCEDURE — 96365 THER/PROPH/DIAG IV INF INIT: CPT

## 2024-12-19 PROCEDURE — 87811 SARS-COV-2 COVID19 W/OPTIC: CPT | Performed by: EMERGENCY MEDICINE

## 2024-12-19 PROCEDURE — 99285 EMERGENCY DEPT VISIT HI MDM: CPT

## 2024-12-19 PROCEDURE — 85025 COMPLETE CBC W/AUTO DIFF WBC: CPT | Performed by: EMERGENCY MEDICINE

## 2024-12-19 PROCEDURE — 96375 TX/PRO/DX INJ NEW DRUG ADDON: CPT

## 2024-12-19 PROCEDURE — 99223 1ST HOSP IP/OBS HIGH 75: CPT | Performed by: INTERNAL MEDICINE

## 2024-12-19 PROCEDURE — 85730 THROMBOPLASTIN TIME PARTIAL: CPT | Performed by: EMERGENCY MEDICINE

## 2024-12-19 PROCEDURE — 99285 EMERGENCY DEPT VISIT HI MDM: CPT | Performed by: EMERGENCY MEDICINE

## 2024-12-19 PROCEDURE — 83735 ASSAY OF MAGNESIUM: CPT | Performed by: EMERGENCY MEDICINE

## 2024-12-19 PROCEDURE — 93005 ELECTROCARDIOGRAM TRACING: CPT

## 2024-12-19 PROCEDURE — 87147 CULTURE TYPE IMMUNOLOGIC: CPT | Performed by: INTERNAL MEDICINE

## 2024-12-19 PROCEDURE — 36415 COLL VENOUS BLD VENIPUNCTURE: CPT | Performed by: EMERGENCY MEDICINE

## 2024-12-19 PROCEDURE — 87804 INFLUENZA ASSAY W/OPTIC: CPT | Performed by: EMERGENCY MEDICINE

## 2024-12-19 PROCEDURE — 85610 PROTHROMBIN TIME: CPT | Performed by: EMERGENCY MEDICINE

## 2024-12-19 PROCEDURE — G0257 UNSCHED DIALYSIS ESRD PT HOS: HCPCS

## 2024-12-19 PROCEDURE — 81001 URINALYSIS AUTO W/SCOPE: CPT | Performed by: EMERGENCY MEDICINE

## 2024-12-19 PROCEDURE — 74176 CT ABD & PELVIS W/O CONTRAST: CPT

## 2024-12-19 PROCEDURE — 87081 CULTURE SCREEN ONLY: CPT | Performed by: INTERNAL MEDICINE

## 2024-12-19 PROCEDURE — 99215 OFFICE O/P EST HI 40 MIN: CPT | Performed by: INTERNAL MEDICINE

## 2024-12-19 RX ORDER — PANTOPRAZOLE SODIUM 40 MG/1
40 TABLET, DELAYED RELEASE ORAL
Status: DISCONTINUED | OUTPATIENT
Start: 2024-12-20 | End: 2024-12-21 | Stop reason: HOSPADM

## 2024-12-19 RX ORDER — ALLOPURINOL 100 MG/1
100 TABLET ORAL DAILY
Status: DISCONTINUED | OUTPATIENT
Start: 2024-12-20 | End: 2024-12-21 | Stop reason: HOSPADM

## 2024-12-19 RX ORDER — DEXTROSE MONOHYDRATE 25 G/50ML
25 INJECTION, SOLUTION INTRAVENOUS ONCE
Status: COMPLETED | OUTPATIENT
Start: 2024-12-19 | End: 2024-12-19

## 2024-12-19 RX ORDER — ATORVASTATIN CALCIUM 80 MG/1
80 TABLET, FILM COATED ORAL DAILY
Status: DISCONTINUED | OUTPATIENT
Start: 2024-12-20 | End: 2024-12-21 | Stop reason: HOSPADM

## 2024-12-19 RX ORDER — POLYETHYLENE GLYCOL 3350 17 G/17G
17 POWDER, FOR SOLUTION ORAL DAILY
Status: DISCONTINUED | OUTPATIENT
Start: 2024-12-20 | End: 2024-12-21 | Stop reason: HOSPADM

## 2024-12-19 RX ORDER — INSULIN LISPRO 100 [IU]/ML
1-6 INJECTION, SOLUTION INTRAVENOUS; SUBCUTANEOUS
Status: DISCONTINUED | OUTPATIENT
Start: 2024-12-19 | End: 2024-12-19

## 2024-12-19 RX ORDER — CALCIUM GLUCONATE 20 MG/ML
1 INJECTION, SOLUTION INTRAVENOUS ONCE
Status: COMPLETED | OUTPATIENT
Start: 2024-12-19 | End: 2024-12-19

## 2024-12-19 RX ORDER — GABAPENTIN 100 MG/1
200 CAPSULE ORAL EVERY 12 HOURS SCHEDULED
Status: DISCONTINUED | OUTPATIENT
Start: 2024-12-19 | End: 2024-12-21 | Stop reason: HOSPADM

## 2024-12-19 RX ORDER — ASPIRIN 81 MG/1
81 TABLET, CHEWABLE ORAL DAILY
Status: DISCONTINUED | OUTPATIENT
Start: 2024-12-20 | End: 2024-12-21 | Stop reason: HOSPADM

## 2024-12-19 RX ORDER — ACETAMINOPHEN 325 MG/1
650 TABLET ORAL EVERY 4 HOURS PRN
Status: DISCONTINUED | OUTPATIENT
Start: 2024-12-19 | End: 2024-12-21 | Stop reason: HOSPADM

## 2024-12-19 RX ORDER — ONDANSETRON 2 MG/ML
4 INJECTION INTRAMUSCULAR; INTRAVENOUS EVERY 4 HOURS PRN
Status: DISCONTINUED | OUTPATIENT
Start: 2024-12-19 | End: 2024-12-21 | Stop reason: HOSPADM

## 2024-12-19 RX ORDER — NIFEDIPINE 30 MG/1
30 TABLET, EXTENDED RELEASE ORAL
Status: DISCONTINUED | OUTPATIENT
Start: 2024-12-19 | End: 2024-12-21 | Stop reason: HOSPADM

## 2024-12-19 RX ORDER — SEVELAMER HYDROCHLORIDE 800 MG/1
800 TABLET, FILM COATED ORAL
Status: DISCONTINUED | OUTPATIENT
Start: 2024-12-19 | End: 2024-12-21 | Stop reason: HOSPADM

## 2024-12-19 RX ADMIN — Medication 2.5 MG: at 16:55

## 2024-12-19 RX ADMIN — APIXABAN 5 MG: 5 TABLET, FILM COATED ORAL at 21:26

## 2024-12-19 RX ADMIN — DEXTROSE MONOHYDRATE 25 G: 25 INJECTION, SOLUTION INTRAVENOUS at 11:14

## 2024-12-19 RX ADMIN — SODIUM BICARBONATE 50 MEQ: 84 INJECTION INTRAVENOUS at 11:14

## 2024-12-19 RX ADMIN — INSULIN HUMAN 10 UNITS: 100 INJECTION, SOLUTION PARENTERAL at 11:15

## 2024-12-19 RX ADMIN — GABAPENTIN 200 MG: 100 CAPSULE ORAL at 21:26

## 2024-12-19 RX ADMIN — Medication 2.5 MG: at 23:08

## 2024-12-19 RX ADMIN — Medication 2.5 MG: at 21:26

## 2024-12-19 RX ADMIN — NIFEDIPINE 30 MG: 30 TABLET, EXTENDED RELEASE ORAL at 21:26

## 2024-12-19 RX ADMIN — CALCIUM GLUCONATE 1 G: 20 INJECTION, SOLUTION INTRAVENOUS at 11:14

## 2024-12-19 NOTE — ASSESSMENT & PLAN NOTE
-on maintenance HD TTS@[facility]  -Access: R RADHAKAYLENE PermCath  -EDW: 99 kg  -last treatment 12/14/2024.  Missed last 2 treatments due to flu-like symptoms and weakness  -plan for HD today with 3 hr treatment with UF 3L.  Will plan for eval in am for dialysis needs  -next full treatment 12/21/2024

## 2024-12-19 NOTE — ASSESSMENT & PLAN NOTE
PAD with recent finding of bilateral popliteal stenoses  Was seen by vascular surgery and patient declined intervention at that time  Continue aspirin and atorvastatin.

## 2024-12-19 NOTE — ASSESSMENT & PLAN NOTE
-Etiology:  due to missed dialysis  -K+ 7.0 on admission  -s/p calcium gluconate, D50 and insulin for temporization  -Plan for urgent HD today.  Plan for 1K bath for last hour of treatment today  -Will continue to manage with HD  -Low potassium diet  -Continue to monitor  -check BMP in am

## 2024-12-19 NOTE — ASSESSMENT & PLAN NOTE
Right renal hypodense lesion measuring 1.7 cm with density greater than simple fluid, noting limited evaluation due to artifact, increased in size since 2022. Nonemergent ultrasound is recommended for further evaluation.

## 2024-12-19 NOTE — PLAN OF CARE
Emergent hemodialysis treatment planned for 180 minutes using a 2 K+ bath for 120 minutes and a 1 K+ bath for last 60 minutes.  Fluid goal 3500 ml/3000 ml net as tolerated to trend toward EDW 99 kg.  Treatment review with Dr. Mack via Epic Chat.        Post-Dialysis RN Treatment Note    Blood Pressure:  Pre 165/73 mm/Hg  Post 156/71 mmHg   EDW:  99 kg    Weight:  Pre 106.3 kg   Post None   Mode of weight measurement: Bed Scale   Volume Removed:  2875 ml/2300 ml net   Treatment duration: 180 minutes    NS given:  No    Treatment shortened No   Medications given during Rx: None Reported   Estimated Kt/V:  None Reported   Access type: Permacath/TDC   Needle Gauge:    Access Issues: Yes, describe: Lines reversed     Report called to primary nurse:   Yes     **Unable to achieve original fluid goal for cramping and low blood pressure.        Problem: METABOLIC, FLUID AND ELECTROLYTES - ADULT  Goal: Electrolytes maintained within normal limits  Description: INTERVENTIONS:  - Monitor labs and assess patient for signs and symptoms of electrolyte imbalances  - Administer electrolyte replacement as ordered  - Monitor response to electrolyte replacements, including repeat lab results as appropriate  - Instruct patient on fluid and nutrition as appropriate  Outcome: Progressing  Goal: Fluid balance maintained  Description: INTERVENTIONS:  - Monitor labs   - Monitor I/O and WT  - Instruct patient on fluid and nutrition as appropriate  - Assess for signs & symptoms of volume excess or deficit  Outcome: Progressing

## 2024-12-19 NOTE — H&P
H&P - Hospitalist   Name: Naresh Carpio 64 y.o. male I MRN: [id number]  Unit/Bed#: [address] [place] [address] Date of Admission: 12/19/2024   Date of Service: 12/19/2024 I Hospital Day: 0     Assessment & Plan  Hyperkalemia  History of diabetes mellitus paroxysmal atrial fibrillation ESRD on HD TTS and PAD who presented to the hospital after missing dialysis  The patient reports generalized weakness and flulike symptoms including diarrhea since the weekend.  He missed treatment on Tuesday and today Thursday.  Recently his losartan was discontinued due to hyperkalemia  Dialysis management by nephrology this evening    Results from last 7 days   Lab Units 12/19/24  1018   POTASSIUM mmol/L 7.0*     Type II diabetes mellitus (HCC)  Lab Results   Component Value Date    HGBA1C 5.5 10/08/2024     Results from last 7 days   Lab Units 12/19/24  1018   GLUCOSE RANDOM mg/dL 128     Diet controlled.  Monitor with a.m. labs  Hypertension  Continue nifedipine  PAD (peripheral artery disease) (HCC)  PAD with recent finding of bilateral popliteal stenoses  Was seen by vascular surgery and patient declined intervention at that time  Continue aspirin and atorvastatin.  Chronic kidney disease-mineral and bone disorder  Lab Results   Component Value Date    EGFR 5 12/19/2024    EGFR 7 12/02/2024    EGFR 10 12/01/2024    CREATININE 9.32 (H) 12/19/2024    CREATININE 7.06 (H) 12/02/2024    CREATININE 5.48 (H) 12/01/2024     ESRD (end stage renal disease) (HCC)  ESRD on HD TTS.  Missed dialysis treatment Tuesday and Thursday today.  Undergoing urgent treatment this evening.    Results from last 7 days   Lab Units 12/19/24  1018   BUN mg/dL 92*   CREATININE mg/dL 9.32*   EGFR ml/min/1.73sq m 5     Paroxysmal atrial fibrillation (HCC)  Rate controlled.  Anticoagulation: Recently restarted on apixaban  Renal lesion  Right renal hypodense lesion measuring 1.7 cm with density greater than simple fluid, noting limited evaluation due to artifact,  increased in size since 2022. Nonemergent ultrasound is recommended for further evaluation.     VTE Pharmacologic Prophylaxis: VTE Score: 3 Moderate Risk (Score 3-4) - Pharmacological DVT Prophylaxis Ordered: apixaban (Eliquis).  Code Status: Level 1 - Full Code   Discussion with family: Patient declined call to .     Anticipated Length of Stay: Patient will be admitted on an observation basis with an anticipated length of stay of less than 2 midnights secondary to hyperkalemia.    Chief Complaint:     Weakness - Generalized (Pt arrived EMS. Pt reports flu like symptoms 3 days. Pt reports missing dialysis Tuesday and has increased weakness w/ dyspnea upon exertion. )    History of Present Illness  Naresh Carpio is a 64 y.o. male with a PMH of ESRD on HD TTS paroxysmal atrial fibrillation PAD hypertension and diet-controlled diabetes who presents with generalized weakness.  It appears the patient has frequent hospitalizations for similar.  He reported flulike syndrome with diarrhea.  His last dialysis treatment was on Saturday.  He missed treatment on Tuesday and could not go to dialysis today.  He came here to the hospital where he was found to have a potassium of 7.0.  He denies any chest pain or shortness of breath.  His GI symptoms seem to have subsided.  He is being admitted for urgent dialysis due to his hyperkalemia.    Review of Systems   Constitutional:  Positive for fatigue.   HENT:  Negative for facial swelling.    Eyes:  Negative for photophobia, pain and visual disturbance.   Respiratory:  Negative for shortness of breath.    Cardiovascular:  Positive for leg swelling. Negative for chest pain and palpitations.   Gastrointestinal:  Positive for diarrhea. Negative for nausea and vomiting.   Genitourinary:  Negative for hematuria.   Musculoskeletal:  Negative for back pain and myalgias.   Skin:  Negative for rash.   Neurological:  Negative for facial asymmetry and speech difficulty.    Psychiatric/Behavioral:  The patient is not nervous/anxious.    All other systems reviewed and are negative.      Past Medical and Surgical History:   Past Medical History:   Diagnosis Date    Acute cystitis 10/18/2024    Acute on chronic anemia 01/23/2022    Atrial fibrillation (HCC)     Diabetes mellitus (HCC)     ESRD (end stage renal disease) on dialysis (HCC)     Hematuria 10/10/2024    Hematuria 10/10/2024    Hyperkalemia 04/06/2024    Hyperlipidemia     Hypertension     Left toe amputee (HCC)     TIA (transient ischemic attack)     Toxic metabolic encephalopathy 10/08/2024     Past Surgical History:   Procedure Laterality Date    AMPUTATION Left     left foot resection 10 years ago dr tran    IR LOWER EXTREMITY ANGIOGRAM  08/29/2023    IR TEMPORARY DIALYSIS CATHETER PLACEMENT  08/25/2023    IR TUNNELED CENTRAL LINE REMOVAL  08/23/2023    IR TUNNELED DIALYSIS CATHETER PLACEMENT  01/27/2022    IR TUNNELED DIALYSIS CATHETER PLACEMENT  08/30/2023    VT AMPUTATION METATARSAL W/TOE SINGLE Right 8/31/2023    Procedure: RIGHT PARTIAL 1ST RAY RESECTION WITH  WOUND VAC APPLICATION;  Surgeon: Won Parmar DPM;  Location: [facility];  Service: Podiatry     Meds/Allergies:  Allergies: No Known Allergies  Prior to Admission Medications   Prescriptions Last Dose Informant Patient Reported? Taking?   NIFEdipine (PROCARDIA XL) 30 mg 24 hr tablet 12/18/2024 Morning  No Yes   Sig: Take 1 tablet (30 mg total) by mouth daily after dinner   allopurinol (ZYLOPRIM) 100 mg tablet  Self No Yes   Sig: Take 1 tablet (100 mg total) by mouth daily   apixaban (Eliquis) 5 mg Past Week  No Yes   Sig: Take 1 tablet (5 mg total) by mouth 2 (two) times a day   aspirin 81 mg chewable tablet   No Yes   Sig: Chew 1 tablet (81 mg total) daily   atorvastatin (LIPITOR) 80 mg tablet Past Week  Yes Yes   Sig: Take 80 mg by mouth daily   gabapentin (NEURONTIN) 100 mg capsule   No Yes   Sig: Take 2 capsules (200 mg total) by mouth 2 (two) times a  day   pantoprazole (PROTONIX) 40 mg tablet   No No   Sig: Take 1 tablet (40 mg total) by mouth daily in the early morning   polyethylene glycol (MIRALAX) 17 g packet  Self No No   Sig: Take 17 g by mouth daily Do not start before September 7, 2023.   sevelamer (RENAGEL) 800 mg tablet  Self No No   Sig: Take 1 tablet (800 mg total) by mouth 3 (three) times a day with meals      Facility-Administered Medications: None     Social History:     Social History     Socioeconomic History    Marital status:      Spouse name: Not on file    Number of children: Not on file    Years of education: Not on file    Highest education level: Not on file   Occupational History    Not on file   Tobacco Use    Smoking status: Never     Passive exposure: Never    Smokeless tobacco: Never   Vaping Use    Vaping status: Never Used   Substance and Sexual Activity    Alcohol use: Not Currently     Alcohol/week: 0.0 standard drinks of alcohol     Comment: 0    Drug use: Never    Sexual activity: Not on file   Other Topics Concern    Not on file   Social History Narrative    Not on file     Social Drivers of Health     Financial Resource Strain: Not on file   Food Insecurity: No Food Insecurity (12/19/2024)    Nursing - Inadequate Food Risk Classification     Worried About Running Out of Food in the Last Year: Never true     Ran Out of Food in the Last Year: Never true     Ran Out of Food in the Last Year: 1   Transportation Needs: No Transportation Needs (12/19/2024)    Nursing - Transportation Risk Classification     Lack of Transportation: Not on file     Lack of Transportation: 2   Physical Activity: Not on file   Stress: Not on file   Social Connections: Not on file   Intimate Partner Violence: Unknown (12/19/2024)    Nursing IPS     Feels Physically and Emotionally Safe: Not on file     Physically Hurt by Someone: Not on file     Humiliated or Emotionally Abused by Someone: Not on file     Physically Hurt by Someone: 2     Hurt  "or Threatened by Someone: 2   Housing Stability: Unknown (2024)    Nursing: Inadequate Housing Risk Classification     Has Housing: Not on file     Worried About Losing Housing: Not on file     Unable to Get Utilities: Not on file     Unable to Pay for Housing in the Last Year: 2     Has Housin   Recent Concern: Housing Stability - High Risk (10/10/2024)    Housing Stability Vital Sign     Unable to Pay for Housing in the Last Year: No     Number of Times Moved in the Last Year: 2     Homeless in the Last Year: No     Patient Pre-hospital Living Situation: Apartment  Patient Pre-hospital Level of Mobility:   Patient Pre-hospital Diet Restrictions:     Objective   Vitals:   Blood Pressure: 149/80 (24 1456)  Pulse: 61 (24 145)  Temperature: 97.5 °F (36.4 °C) (24 145)  Temp Source: Oral (24 145)  Respirations: 16 (24 145)  Height: 6' 1\" (185.4 cm) (24 145)  Weight - Scale: 110 kg (242 lb 12.8 oz) (24 145)  SpO2: 93 % (24 1456)    Physical Exam  Vitals reviewed.   Constitutional:       General: He is not in acute distress.  HENT:      Head: Atraumatic.   Eyes:      General: No scleral icterus.     Extraocular Movements: Extraocular movements intact.   Cardiovascular:      Rate and Rhythm: Regular rhythm.   Pulmonary:      Effort: Pulmonary effort is normal.      Breath sounds: Decreased breath sounds present. No wheezing.   Abdominal:      General: Bowel sounds are normal.      Palpations: Abdomen is soft.      Tenderness: There is no abdominal tenderness. There is no guarding.   Musculoskeletal:      Right lower leg: Edema present.      Left lower leg: Edema present.   Skin:     General: Skin is warm.      Findings: Wound (Scabbed/healing) present.   Neurological:      General: No focal deficit present.      Mental Status: He is alert and oriented to person, place, and time.      Cranial Nerves: No cranial nerve deficit.   Psychiatric:         Mood and " Affect: Mood normal.         Additional Data:   Lab Results: I have reviewed the following results:  Results from last 7 days   Lab Units 12/19/24  1018   WBC Thousand/uL 6.36   HEMOGLOBIN g/dL 8.1*   HEMATOCRIT % 26.4*   PLATELETS Thousands/uL 219   SEGS PCT % 62   LYMPHO PCT % 20   MONO PCT % 10   EOS PCT % 6     Results from last 7 days   Lab Units 12/19/24  1018   SODIUM mmol/L 134*   POTASSIUM mmol/L 7.0*   CHLORIDE mmol/L 102   CO2 mmol/L 22   ANION GAP mmol/L 10   BUN mg/dL 92*   CREATININE mg/dL 9.32*   CALCIUM mg/dL 8.3*   ALBUMIN g/dL 3.7   TOTAL BILIRUBIN mg/dL 0.28   ALK PHOS U/L 113*   ALT U/L 74*   AST U/L 47*   EGFR ml/min/1.73sq m 5   GLUCOSE RANDOM mg/dL 128     Results from last 7 days   Lab Units 12/19/24  1018   INR  1.20*                              Lines/Drains  Invasive Devices       Peripheral Intravenous Line  Duration             Peripheral IV 12/19/24 Right Antecubital <1 day              Hemodialysis Catheter  Duration             HD Permanent Double Catheter -- days                    Imaging:   Personally reviewed the following image studies in PACS and associated radiology reports:  CT abdomen pelvis wo contrast  Result Date: 12/19/2024  Impression: 1. Mild diffuse urinary bladder wall thickening. Correlate with urinalysis. Otherwise, no acute abnormality in the abdomen or pelvis. 2. Small volume ascites. Partially visualized moderate bilateral pleural effusions and bibasilar atelectasis. 3. Right renal hypodense lesion measuring 1.7 cm with density greater than simple fluid, noting limited evaluation due to artifact, increased in size since 2022. Nonemergent ultrasound is recommended for further evaluation. The study was marked in EPIC for immediate notification. Workstation performed: [id number]       EKG, Pathology, and Other Studies Reviewed on Admission:   EKG  Result Date: 12/19/24  Personally reviewed strips with impression of: Sinus bradycardia 50 bpm    Administrative  Statements   Reviewed previous hospitalization and records    ** Please Note: This note has been constructed using a voice recognition system. **

## 2024-12-19 NOTE — ED PROVIDER NOTES
Time reflects when diagnosis was documented in both MDM as applicable and the Disposition within this note       Time User Action Codes Description Comment    12/19/2024 12:01 PM Damaris Beth Add [R53.1] Weakness     12/19/2024 12:01 PM Ignacia Damaris Add [E87.5] Hyperkalemia     12/19/2024 12:01 PM Damaris Beth Add [N18.6] ESRD (end stage renal disease) (HCC)           ED Disposition       ED Disposition   Admit    Condition   Stable    Date/Time   Thu Dec 19, 2024 12:01 PM    Comment                   Assessment & Plan       Medical Decision Making  Patient evaluated the ED with EKG labs imaging.  Given IV calcium gluconate IV insulin D50 and an amp of bicarb for the hyperkalemia contacted the nephrologist who will arrange for dialysis tonight admit to the hospitalist service for further evaluation and management    Problems Addressed:  ESRD (end stage renal disease) ([facility]): acute illness or injury  Hyperkalemia: acute illness or injury  Weakness: acute illness or injury    Amount and/or Complexity of Data Reviewed  External Data Reviewed: notes.  Labs: ordered. Decision-making details documented in ED Course.  Radiology: ordered. Decision-making details documented in ED Course.  ECG/medicine tests: ordered and independent interpretation performed. Decision-making details documented in ED Course.    Risk  OTC drugs.  Prescription drug management.  Decision regarding hospitalization.             Medications   insulin regular (HumuLIN R,NovoLIN R) injection 10 Units (10 Units Intravenous Given 12/19/24 1115)   dextrose 50 % IV solution 25 g (25 g Intravenous Given 12/19/24 1114)   calcium gluconate 1 g in sodium chloride 0.9% 50 mL (premix) (0 g Intravenous Stopped 12/19/24 1144)   sodium bicarbonate 8.4 % injection 50 mEq (50 mEq Intravenous Given 12/19/24 1114)       ED Risk Strat Scores                          SBIRT 22yo+      Flowsheet Row Most Recent Value   Initial Alcohol Screen: US AUDIT-C     1. How often  do you have a drink containing alcohol? 0 Filed at: 12/19/2024 0940   2. How many drinks containing alcohol do you have on a typical day you are drinking?  0 Filed at: 12/19/2024 0940   3a. Male UNDER 65: How often do you have five or more drinks on one occasion? 0 Filed at: 12/19/2024 0940   3b. FEMALE Any Age, or MALE 65+: How often do you have 4 or more drinks on one occassion? 0 Filed at: 12/19/2024 0940   Audit-C Score 0 Filed at: 12/19/2024 0940   NICOLÁS: How many times in the past year have you...    Used an illegal drug or used a prescription medication for non-medical reasons? Never Filed at: 12/19/2024 0940                            History of Present Illness       Chief Complaint   Patient presents with    Weakness - Generalized     Pt arrived EMS. Pt reports flu like symptoms 3 days. Pt reports missing dialysis Tuesday and has increased weakness w/ dyspnea upon exertion.        Past Medical History:   Diagnosis Date    Acute cystitis 10/18/2024    Acute on chronic anemia 01/23/2022    Atrial fibrillation (HCC)     Diabetes mellitus (HCC)     ESRD (end stage renal disease) on dialysis (HCC)     Hematuria 10/10/2024    Hematuria 10/10/2024    Hyperkalemia 04/06/2024    Hyperlipidemia     Hypertension     Left toe amputee (HCC)     TIA (transient ischemic attack)     Toxic metabolic encephalopathy 10/08/2024      Past Surgical History:   Procedure Laterality Date    AMPUTATION Left     left foot resection 10 years ago dr tran    IR LOWER EXTREMITY ANGIOGRAM  08/29/2023    IR TEMPORARY DIALYSIS CATHETER PLACEMENT  08/25/2023    IR TUNNELED CENTRAL LINE REMOVAL  08/23/2023    IR TUNNELED DIALYSIS CATHETER PLACEMENT  01/27/2022    IR TUNNELED DIALYSIS CATHETER PLACEMENT  08/30/2023    IA AMPUTATION METATARSAL W/TOE SINGLE Right 8/31/2023    Procedure: RIGHT PARTIAL 1ST RAY RESECTION WITH  WOUND VAC APPLICATION;  Surgeon: Won Parmar DPM;  Location: [facility];  Service: Podiatry      History reviewed. No  pertinent family history.   Social History     Tobacco Use    Smoking status: Never     Passive exposure: Never    Smokeless tobacco: Never   Vaping Use    Vaping status: Never Used   Substance Use Topics    Alcohol use: Not Currently     Alcohol/week: 0.0 standard drinks of alcohol     Comment: 0    Drug use: Never      E-Cigarette/Vaping    E-Cigarette Use Never User       E-Cigarette/Vaping Substances    Nicotine No     THC No     CBD No     Flavoring No     Other No     Unknown No       I have reviewed and agree with the history as documented.     64-year-old male presents with generalized weakness and multiple episodes of nonbloody diarrhea no abdominal pain vomiting fevers chills dysuria frequency or any other symptoms he still urinates he is end-stage renal disease patient on dialysis and he missed his Tuesday session as well as today.      History provided by:  Patient   used: No        Review of Systems   Constitutional: Negative.    HENT: Negative.     Eyes: Negative.    Respiratory: Negative.     Cardiovascular: Negative.    Gastrointestinal:  Positive for diarrhea.   Endocrine: Negative.    Genitourinary: Negative.    Musculoskeletal: Negative.    Skin: Negative.    Allergic/Immunologic: Negative.    Neurological:  Positive for weakness.   Hematological: Negative.    Psychiatric/Behavioral: Negative.     All other systems reviewed and are negative.          Objective       ED Triage Vitals   Temperature Pulse Blood Pressure Respirations SpO2 Patient Position - Orthostatic VS   12/19/24 0938 12/19/24 0938 12/19/24 0938 12/19/24 0938 12/19/24 0938 --   (!) 97.3 °F (36.3 °C) 56 (!) 185/68 20 96 %       Temp Source Heart Rate Source BP Location FiO2 (%) Pain Score    12/19/24 0938 12/19/24 0938 12/19/24 1123 -- 12/19/24 0938    Temporal Monitor Right arm  No Pain      Vitals      Date and Time Temp Pulse SpO2 Resp BP Pain Score FACES Pain Rating User   12/19/24 1459 -- -- -- -- -- No Pain  -- DL   12/19/24 1456 97.5 °F (36.4 °C) 61 93 % -- -- -- -- DII   12/19/24 1456 -- -- -- 16 149/80 -- -- DII   12/19/24 1452 97.5 °F (36.4 °C) 74 95 % 16 149/80 -- -- DL   12/19/24 1400 -- -- -- -- -- No Pain -- DL   12/19/24 1353 -- 63 97 % -- 143/83 -- -- DII   12/19/24 1352 -- 63 97 % -- 143/83 -- -- DII   12/19/24 1300 -- 53 96 % 13 183/80 -- -- CS   12/19/24 1230 -- 54 93 % 20 134/74 -- -- CS   12/19/24 1200 -- 55 94 % 19 195/86 -- -- CS   12/19/24 1130 -- 55 93 % 19 176/79 -- -- CS   12/19/24 1123 -- 55 94 % 18 188/82 -- -- CS   12/19/24 0943 -- -- -- -- -- No Pain -- CS   12/19/24 0938 97.3 °F (36.3 °C) 56 96 % 20 185/68 No Pain -- CS            Physical Exam  Vitals and nursing note reviewed.   Constitutional:       Appearance: Normal appearance.   HENT:      Head: Normocephalic and atraumatic.      Nose: Nose normal.      Mouth/Throat:      Mouth: Mucous membranes are moist.   Eyes:      Extraocular Movements: Extraocular movements intact.      Pupils: Pupils are equal, round, and reactive to light.   Cardiovascular:      Rate and Rhythm: Normal rate and regular rhythm.   Pulmonary:      Effort: Pulmonary effort is normal.      Breath sounds: Normal breath sounds.   Abdominal:      General: Abdomen is flat. Bowel sounds are normal.      Palpations: Abdomen is soft.   Musculoskeletal:         General: Normal range of motion.      Cervical back: Normal range of motion and neck supple.   Skin:     General: Skin is warm.      Capillary Refill: Capillary refill takes less than 2 seconds.   Neurological:      General: No focal deficit present.      Mental Status: He is alert and oriented to person, place, and time. Mental status is at baseline.   Psychiatric:         Mood and Affect: Mood normal.         Thought Content: Thought content normal.         Results Reviewed       Procedure Component Value Units Date/Time    Urine Microscopic [[id number]]  (Normal) Collected: 12/19/24 1332    Lab Status: Final result  Specimen: Urine, Clean Catch Updated: 12/19/24 1351     RBC, UA 0-1 /hpf      WBC, UA 1-2 /hpf      Epithelial Cells None Seen /hpf      Bacteria, UA Occasional /hpf     UA (URINE) with reflex to Scope [[id number]]  (Abnormal) Collected: 12/19/24 1332    Lab Status: Final result Specimen: Urine, Clean Catch Updated: 12/19/24 1338     Color, UA Colorless     Clarity, UA Clear     Specific Gravity, UA 1.015     pH, UA 7.5     Leukocytes, UA Small     Nitrite, UA Negative     Protein,  (2+) mg/dl      Glucose, UA 70 (7/100%) mg/dl      Ketones, UA Negative mg/dl      Urobilinogen, UA <2.0 mg/dl      Bilirubin, UA Negative     Occult Blood, UA Trace    Comprehensive metabolic panel [[id number]]  (Abnormal) Collected: 12/19/24 1018    Lab Status: Final result Specimen: Blood from Arm, Right Updated: 12/19/24 1101     Sodium 134 mmol/L      Potassium 7.0 mmol/L      Chloride 102 mmol/L      CO2 22 mmol/L      ANION GAP 10 mmol/L      BUN 92 mg/dL      Creatinine 9.32 mg/dL      Glucose 128 mg/dL      Calcium 8.3 mg/dL      AST 47 U/L      ALT 74 U/L      Alkaline Phosphatase 113 U/L      Total Protein 7.1 g/dL      Albumin 3.7 g/dL      Total Bilirubin 0.28 mg/dL      eGFR 5 ml/min/1.73sq m     Narrative:      National Kidney Disease Foundation guidelines for Chronic Kidney Disease (CKD):     Stage 1 with normal or high GFR (GFR > 90 mL/min/1.73 square meters)    Stage 2 Mild CKD (GFR = 60-89 mL/min/1.73 square meters)    Stage 3A Moderate CKD (GFR = 45-59 mL/min/1.73 square meters)    Stage 3B Moderate CKD (GFR = 30-44 mL/min/1.73 square meters)    Stage 4 Severe CKD (GFR = 15-29 mL/min/1.73 square meters)    Stage 5 End Stage CKD (GFR <15 mL/min/1.73 square meters)  Note: GFR calculation is accurate only with a steady state creatinine    Protime-INR [[id number]]  (Abnormal) Collected: 12/19/24 1018    Lab Status: Final result Specimen: Blood from Arm, Right Updated: 12/19/24 1058     Protime 15.7 seconds      INR  1.20    Narrative:      INR Therapeutic Range    Indication                                             INR Range      Atrial Fibrillation                                               2.0-3.0  Hypercoagulable State                                    2.0.2.3  Left Ventricular Asist Device                            2.0-3.0  Mechanical Heart Valve                                  -    Aortic(with afib, MI, embolism, HF, LA enlargement,    and/or coagulopathy)                                     2.0-3.0 (2.5-3.5)     Mitral                                                             2.5-3.5  Prosthetic/Bioprosthetic Heart Valve               2.0-3.0  Venous thromboembolism (VTE: VT, PE        2.0-3.0    APTT [[id number]]  (Normal) Collected: 12/19/24 1018    Lab Status: Final result Specimen: Blood from Arm, Right Updated: 12/19/24 1058     PTT 30 seconds     Magnesium [[id number]]  (Normal) Collected: 12/19/24 1018    Lab Status: Final result Specimen: Blood from Arm, Right Updated: 12/19/24 1042     Magnesium 2.2 mg/dL     Lipase [[id number]]  (Normal) Collected: 12/19/24 1018    Lab Status: Final result Specimen: Blood from Arm, Right Updated: 12/19/24 1042     Lipase 55 u/L     FLU/COVID Rapid Antigen (30 min. TAT) - Preferred screening test in ED [[id number]]  (Normal) Collected: 12/19/24 1018    Lab Status: Final result Specimen: Nares from Nose Updated: 12/19/24 1041     SARS COV Rapid Antigen Negative     Influenza A Rapid Antigen Negative     Influenza B Rapid Antigen Negative    Narrative:      This test has been performed using the Quidel Gracie 2 FLU+SARS Antigen test under the Emergency Use Authorization (EUA). This test has been validated by the  and verified by the performing laboratory. The Gracie uses lateral flow immunofluorescent sandwich assay to detect SARS-COV, Influenza A and Influenza B Antigen.     The Quidel Gracie 2 SARS Antigen test does not differentiate between SARS-CoV and  SARS-CoV-2.     Negative results are presumptive and may be confirmed with a molecular assay, if necessary, for patient management. Negative results do not rule out SARS-CoV-2 or influenza infection and should not be used as the sole basis for treatment or patient management decisions. A negative test result may occur if the level of antigen in a sample is below the limit of detection of this test.     Positive results are indicative of the presence of viral antigens, but do not rule out bacterial infection or co-infection with other viruses.     All test results should be used as an adjunct to clinical observations and other information available to the provider.    FOR PEDIATRIC PATIENTS - copy/paste COVID Guidelines URL to browser: [email or web address]    CBC and differential [[id number]]  (Abnormal) Collected: 12/19/24 1018    Lab Status: Final result Specimen: Blood from Arm, Right Updated: 12/19/24 1025     WBC 6.36 Thousand/uL      RBC 2.69 Million/uL      Hemoglobin 8.1 g/dL      Hematocrit 26.4 %      MCV 98 fL      MCH 30.1 pg      MCHC 30.7 g/dL      RDW 13.9 %      MPV 9.9 fL      Platelets 219 Thousands/uL      nRBC 0 /100 WBCs      Segmented % 62 %      Immature Grans % 1 %      Lymphocytes % 20 %      Monocytes % 10 %      Eosinophils Relative 6 %      Basophils Relative 1 %      Absolute Neutrophils 3.98 Thousands/µL      Absolute Immature Grans 0.03 Thousand/uL      Absolute Lymphocytes 1.29 Thousands/µL      Absolute Monocytes 0.62 Thousand/µL      Eosinophils Absolute 0.41 Thousand/µL      Basophils Absolute 0.03 Thousands/µL             CT abdomen pelvis wo contrast   Final Interpretation by Milan Posada MD (12/19 0376)      1. Mild diffuse urinary bladder wall thickening. Correlate with urinalysis. Otherwise, no acute abnormality in the abdomen or pelvis.      2. Small volume ascites. Partially visualized moderate bilateral pleural  effusions and bibasilar atelectasis.      3. Right renal hypodense lesion measuring 1.7 cm with density greater than simple fluid, noting limited evaluation due to artifact, increased in size since 2022. Nonemergent ultrasound is recommended for further evaluation.      The study was marked in EPIC for immediate notification.      Workstation performed: [id number]             ECG 12 Lead Documentation Only    Date/Time: 12/19/2024 4:43 PM    Performed by: Damaris Beth DO  Authorized by: Damaris Beth DO    ECG reviewed by me, the ED Provider: yes    Patient location:  ED  Previous ECG:     Previous ECG:  Unavailable    Comparison to cardiac monitor: Yes    Interpretation:     Interpretation: non-specific    Rate:     ECG rate assessment: normal    Rhythm:     Rhythm: sinus rhythm    Ectopy:     Ectopy: none    QRS:     QRS axis:  Normal  Conduction:     Conduction: normal    ST segments:     ST segments:  Non-specific  T waves:     T waves: non-specific        ED Medication and Procedure Management   Prior to Admission Medications   Prescriptions Last Dose Informant Patient Reported? Taking?   NIFEdipine (PROCARDIA XL) 30 mg 24 hr tablet 12/18/2024 Morning  No Yes   Sig: Take 1 tablet (30 mg total) by mouth daily after dinner   allopurinol (ZYLOPRIM) 100 mg tablet  Self No Yes   Sig: Take 1 tablet (100 mg total) by mouth daily   apixaban (Eliquis) 5 mg Past Week  No Yes   Sig: Take 1 tablet (5 mg total) by mouth 2 (two) times a day   aspirin 81 mg chewable tablet   No Yes   Sig: Chew 1 tablet (81 mg total) daily   atorvastatin (LIPITOR) 80 mg tablet Past Week  Yes Yes   Sig: Take 80 mg by mouth daily   gabapentin (NEURONTIN) 100 mg capsule   No Yes   Sig: Take 2 capsules (200 mg total) by mouth 2 (two) times a day   pantoprazole (PROTONIX) 40 mg tablet   No No   Sig: Take 1 tablet (40 mg total) by mouth daily in the early morning   polyethylene glycol (MIRALAX) 17 g packet  Self No No   Sig: Take 17 g by mouth daily  Do not start before September 7, 2023.   sevelamer (RENAGEL) 800 mg tablet  Self No No   Sig: Take 1 tablet (800 mg total) by mouth 3 (three) times a day with meals      Facility-Administered Medications: None     Current Discharge Medication List        CONTINUE these medications which have NOT CHANGED    Details   allopurinol (ZYLOPRIM) 100 mg tablet Take 1 tablet (100 mg total) by mouth daily  Qty: 30 tablet, Refills: 0    Associated Diagnoses: Gout      apixaban (Eliquis) 5 mg Take 1 tablet (5 mg total) by mouth 2 (two) times a day  Qty: 60 tablet, Refills: 0    Associated Diagnoses: Paroxysmal atrial fibrillation (HCC)      aspirin 81 mg chewable tablet Chew 1 tablet (81 mg total) daily  Qty: 30 tablet, Refills: 0    Associated Diagnoses: Stroke-like symptoms      atorvastatin (LIPITOR) 80 mg tablet Take 80 mg by mouth daily      gabapentin (NEURONTIN) 100 mg capsule Take 2 capsules (200 mg total) by mouth 2 (two) times a day  Qty: 120 capsule, Refills: 0    Associated Diagnoses: Diabetic polyneuropathy associated with type 2 diabetes mellitus ([facility])      NIFEdipine (PROCARDIA XL) 30 mg 24 hr tablet Take 1 tablet (30 mg total) by mouth daily after dinner    Associated Diagnoses: Hypertensive urgency      pantoprazole (PROTONIX) 40 mg tablet Take 1 tablet (40 mg total) by mouth daily in the early morning  Qty: 30 tablet, Refills: 0    Associated Diagnoses: Nausea      polyethylene glycol (MIRALAX) 17 g packet Take 17 g by mouth daily Do not start before September 7, 2023.  Refills: 0    Associated Diagnoses: Constipation      sevelamer (RENAGEL) 800 mg tablet Take 1 tablet (800 mg total) by mouth 3 (three) times a day with meals  Refills: 0    Associated Diagnoses: ESRD (end stage renal disease) ([facility])           No discharge procedures on file.  ED SEPSIS DOCUMENTATION   Time reflects when diagnosis was documented in both MDM as applicable and the Disposition within this note       Time User Action Codes  Description Comment    12/19/2024 12:01 PM Damaris Beth [R53.1] Weakness     12/19/2024 12:01 PM Damaris eBth [E87.5] Hyperkalemia     12/19/2024 12:01 PM Damaris Beth [N18.6] ESRD (end stage renal disease) (HCC)                  Damaris Beth, DO  12/19/24 1642

## 2024-12-19 NOTE — ASSESSMENT & PLAN NOTE
Lab Results   Component Value Date    HGBA1C 5.5 10/08/2024   -stable  -continue to optimize glycemic control  -management per primary team

## 2024-12-19 NOTE — ASSESSMENT & PLAN NOTE
-Calcium and magnesium stable  -Phosphorus as outlined below  -Continue to monitor and manage as outpatient

## 2024-12-19 NOTE — ASSESSMENT & PLAN NOTE
ESRD on HD TTS.  Missed dialysis treatment Tuesday and Thursday today.  Undergoing urgent treatment this evening.    Results from last 7 days   Lab Units 12/19/24  1018   BUN mg/dL 92*   CREATININE mg/dL 9.32*   EGFR ml/min/1.73sq m 5

## 2024-12-19 NOTE — ASSESSMENT & PLAN NOTE
Lab Results   Component Value Date    EGFR 5 12/19/2024    EGFR 7 12/02/2024    EGFR 10 12/01/2024    CREATININE 9.32 (H) 12/19/2024    CREATININE 7.06 (H) 12/02/2024    CREATININE 5.48 (H) 12/01/2024

## 2024-12-19 NOTE — ASSESSMENT & PLAN NOTE
Lab Results   Component Value Date    HGBA1C 5.5 10/08/2024     Results from last 7 days   Lab Units 12/19/24  1018   GLUCOSE RANDOM mg/dL 128     Diet controlled.  Monitor with a.m. labs

## 2024-12-19 NOTE — ASSESSMENT & PLAN NOTE
-Hgb 8.1, below goal.  Goal Hgb >10.0  -on Mircera 100 mcg every 2 weeks and Venofer 50 mg IV weekly as outpatient  -trend CBC  -transfuse for Hgb <7.0  -management per primary team

## 2024-12-19 NOTE — ASSESSMENT & PLAN NOTE
-BP hypertensive.  Suspect volume mediated  -volume status hypervolemic  -home Rx: Nifedipine 30 mg daily  -Previously on losartan but discontinued during last hospitalization with hyperkalemia  -continue UF with dialysis as BP tolerates  -Avoid hypotension, maintain MAP >65  -continue to monitor

## 2024-12-19 NOTE — CONSULTS
Consultation - Nephrology   Name: Naresh Carpio 64 y.o. male I MRN: [id number]  Unit/Bed#: [address] [place] [address]-01 I Date of Admission: 12/19/2024   Date of Service: 12/19/2024 I Hospital Day: 0   Inpatient consult to Nephrology  Consult performed by: Eliana Kingston PA-C  Consult ordered by: Freddy Carrasquillo DO        Physician Requesting Evaluation: Freddy Carrasquillo DO   Reason for Evaluation / Principal Problem: ESRD on HD    Assessment & Plan  ESRD (end stage renal disease) (HCC)  -on maintenance HD TTS@[facility]  -Access: R IJV PermCath  -EDW: 99 kg  -last treatment 12/14/2024.  Missed last 2 treatments due to flu-like symptoms and weakness  -plan for HD today with 3 hr treatment with UF 3L.  Will plan for eval in am for dialysis needs  -next full treatment 12/21/2024    Hyperkalemia  -Etiology:  due to missed dialysis  -K+ 7.0 on admission  -s/p calcium gluconate, D50 and insulin for temporization  -Plan for urgent HD today.  Plan for 1K bath for last hour of treatment today  -Will continue to manage with HD  -Low potassium diet  -Continue to monitor  -check BMP in am    Hypertension  -BP hypertensive.  Suspect volume mediated  -volume status hypervolemic  -home Rx: Nifedipine 30 mg daily  -Previously on losartan but discontinued during last hospitalization with hyperkalemia  -continue UF with dialysis as BP tolerates  -Avoid hypotension, maintain MAP >65  -continue to monitor    Anemia due to chronic kidney disease, on chronic dialysis (HCC)  -Hgb 8.1, below goal.  Goal Hgb >10.0  -on Mircera 100 mcg every 2 weeks and Venofer 50 mg IV weekly as outpatient  -trend CBC  -transfuse for Hgb <7.0  -management per primary team    Chronic kidney disease-mineral and bone disorder  -Calcium and magnesium stable  -Phosphorus as outlined below  -Continue to monitor and manage as outpatient  Hyperphosphatemia  -Continue binders  -On sevelamer 800 mg 3 times daily with meals  -Low phosphorus diet  Type II diabetes  mellitus (HCC)  Lab Results   Component Value Date    HGBA1C 5.5 10/08/2024   -stable  -continue to optimize glycemic control  -management per primary team    Paroxysmal atrial fibrillation (HCC)  -stable, rate controlled  -on on Eliquis and nifedipine  -management per primary team      HISTORY OF PRESENT ILLNESS:  Requesting Physician: Freddy Carrasquillo DO  Reason for Consult: ESRD on HD    Naresh Carpio is a 64 y.o. year old male with ESRD on HD TTS via R IJV PermCath at [facility], DM2, HTN, HLD, AF on Eliquis who was [facility] after presenting with generalized weakness and found to have potassium of 7.0.  Patient last dialysis 12/14/2024, missing his last 2 dialysis treatments.  Patient reports flulike symptoms since Saturday and reports that is why he did not go to dialysis.  He complains of feeling overloaded with lower extremity edema.. A renal consultation is requested today for assistance in the management of ESRD. Naresh Carpio is a known ESRD patient who undergoes maintenance hemodialysis at [facility] on Tuesday, Thursday, Saturday.    PAST MEDICAL HISTORY:  Past Medical History:   Diagnosis Date    Acute cystitis 10/18/2024    Acute on chronic anemia 01/23/2022    Atrial fibrillation (HCC)     Diabetes mellitus (HCC)     ESRD (end stage renal disease) on dialysis (HCC)     Hematuria 10/10/2024    Hematuria 10/10/2024    Hyperkalemia 04/06/2024    Hyperlipidemia     Hypertension     Left toe amputee (HCC)     TIA (transient ischemic attack)     Toxic metabolic encephalopathy 10/08/2024       PAST SURGICAL HISTORY:  Past Surgical History:   Procedure Laterality Date    AMPUTATION Left     left foot resection 10 years ago dr tran    IR LOWER EXTREMITY ANGIOGRAM  08/29/2023    IR TEMPORARY DIALYSIS CATHETER PLACEMENT  08/25/2023    IR TUNNELED CENTRAL LINE REMOVAL  08/23/2023    IR TUNNELED DIALYSIS CATHETER PLACEMENT  01/27/2022    IR TUNNELED DIALYSIS CATHETER PLACEMENT  08/30/2023     [place] AMPUTATION METATARSAL W/TOE SINGLE Right 8/31/2023    Procedure: RIGHT PARTIAL 1ST RAY RESECTION WITH  WOUND VAC APPLICATION;  Surgeon: Won Parmar DPM;  Location: [facility];  Service: Podiatry       ALLERGIES:  No Known Allergies    SOCIAL HISTORY:  Social History     Substance and Sexual Activity   Alcohol Use Not Currently    Alcohol/week: 0.0 standard drinks of alcohol    Comment: 0     Social History     Substance and Sexual Activity   Drug Use Never     Social History     Tobacco Use   Smoking Status Never    Passive exposure: Never   Smokeless Tobacco Never       FAMILY HISTORY:  History reviewed. No pertinent family history.    MEDICATIONS:    Current Facility-Administered Medications:     insulin lispro (HumALOG/ADMELOG) 100 units/mL subcutaneous injection 1-6 Units, 1-6 Units, Subcutaneous, 4x Daily (AC & HS) **AND** Fingerstick Glucose (POCT), , , 4x Daily AC and at bedtime, Freddy Carrasquillo DO    REVIEW OF SYSTEMS:  A complete 10 point review of systems was performed and found to be negative unless otherwise noted below or in the HPI.  General: No fevers, chills.   Cardiovascular: No chest pain, shortness of breath, palpitations, leg edema.  Respiratory: No cough, sputum production, shortness of breath.  Gastrointestinal: No nausea, vomiting, abdominal pain, diarrhea.  Genitourinary: No hematuria.    PHYSICAL EXAM:  Current Weight:    First Weight:    Vitals:    12/19/24 1230 12/19/24 1300 12/19/24 1352 12/19/24 1353   BP: 134/74 (!) 183/80 143/83 143/83   BP Location: Left arm Left arm     Pulse: (!) 54 (!) 53 63 63   Resp: 20 13     Temp:       TempSrc:       SpO2: 93% 96% 97% 97%       Intake/Output Summary (Last 24 hours) at 12/19/2024 1411  Last data filed at 12/19/2024 1144  Gross per 24 hour   Intake 50 ml   Output --   Net 50 ml     General:  Awake, alert, appears comfortable and in no acute distress.   Skin:  No rash, warm, good skin turgor   Eyes:  PERRL, EOMI, sclerae nonicteric.  no  periorbital edema   ENT:  Moist mucous membranes  Neck:  Trachea midline, symmetric.  No JVD.  No carotid bruits.  Chest: Few bibasilar crackles  CVS:  Regular rate and rhythm without murmur, gallop or rub.  S1 and S2 identified and normal.  No S3, S4.   Abdomen:  Soft, nontender, nondistended without masses.  Normal bowel sounds x 4 quadrants.  No bruit.  Extremities:  Warm, pink, motor and sensory intact and well perfused.  No cyanosis, pallor.  Trace-1+ bilateral lower leg edema.  Neuro:  Awake, alert, oriented x3.  Grossly intact  Psych:  Appropriate affect.  Mentating appropriately.  Normal mental status exam  Access: R IJV PermCath with dressing intact.  Site clear       Invasive Devices: R IJV PermCath     Lab Results:   Results from last 7 days   Lab Units 12/19/24  1018   WBC Thousand/uL 6.36   HEMOGLOBIN g/dL 8.1*   HEMATOCRIT % 26.4*   PLATELETS Thousands/uL 219   SODIUM mmol/L 134*   POTASSIUM mmol/L 7.0*   CHLORIDE mmol/L 102   CO2 mmol/L 22   BUN mg/dL 92*   CREATININE mg/dL 9.32*   CALCIUM mg/dL 8.3*   MAGNESIUM mg/dL 2.2   ALK PHOS U/L 113*   ALT U/L 74*   AST U/L 47*     Lab Results   Component Value Date    CALCIUM 8.3 (L) 12/19/2024    PHOS 5.1 (H) 11/21/2024   Imaging study:  CT abdomen pelvis without contrast 12/19/2024:  Imaging study and images personally reviewed.  Bilateral renal cysts with right 1.7 cm renal hypodensity with density greater than simple fluid, increase in size since 2022.  No hydronephrosis.  Small volume ascites.  No pneumoperitoneum.  Enlarged inguinal, iliac chain and retroperitoneal lymph nodes.  Prostamegaly.    EMR, including [facility] [employer]  [facility] kaya and outpatient notes reviewed.  I have personally reviewed the blood work as stated above and in my note.  I have personally reviewed CT abdomen pelvis without contrast imaging studies.  I have personally reviewed primary medical service and previous nephrology note.

## 2024-12-19 NOTE — ASSESSMENT & PLAN NOTE
History of diabetes mellitus paroxysmal atrial fibrillation ESRD on HD TTS and PAD who presented to the hospital after missing dialysis  The patient reports generalized weakness and flulike symptoms including diarrhea since the weekend.  He missed treatment on Tuesday and today Thursday.  Recently his losartan was discontinued due to hyperkalemia  Dialysis management by nephrology this evening    Results from last 7 days   Lab Units 12/19/24  1018   POTASSIUM mmol/L 7.0*

## 2024-12-20 LAB
ANION GAP SERPL CALCULATED.3IONS-SCNC: 10 MMOL/L (ref 4–13)
BUN SERPL-MCNC: 50 MG/DL (ref 5–25)
CALCIUM SERPL-MCNC: 8.4 MG/DL (ref 8.4–10.2)
CHLORIDE SERPL-SCNC: 97 MMOL/L (ref 96–108)
CO2 SERPL-SCNC: 27 MMOL/L (ref 21–32)
CREAT SERPL-MCNC: 6.29 MG/DL (ref 0.6–1.3)
ERYTHROCYTE [DISTWIDTH] IN BLOOD BY AUTOMATED COUNT: 13.5 % (ref 11.6–15.1)
GFR SERPL CREATININE-BSD FRML MDRD: 8 ML/MIN/1.73SQ M
GLUCOSE P FAST SERPL-MCNC: 77 MG/DL (ref 65–99)
GLUCOSE SERPL-MCNC: 77 MG/DL (ref 65–140)
HCT VFR BLD AUTO: 26.7 % (ref 36.5–49.3)
HGB BLD-MCNC: 8.6 G/DL (ref 12–17)
MCH RBC QN AUTO: 30.7 PG (ref 26.8–34.3)
MCHC RBC AUTO-ENTMCNC: 32.2 G/DL (ref 31.4–37.4)
MCV RBC AUTO: 95 FL (ref 82–98)
PLATELET # BLD AUTO: 220 THOUSANDS/UL (ref 149–390)
PMV BLD AUTO: 10.1 FL (ref 8.9–12.7)
POTASSIUM SERPL-SCNC: 5.2 MMOL/L (ref 3.5–5.3)
RBC # BLD AUTO: 2.8 MILLION/UL (ref 3.88–5.62)
SODIUM SERPL-SCNC: 134 MMOL/L (ref 135–147)
WBC # BLD AUTO: 6.77 THOUSAND/UL (ref 4.31–10.16)

## 2024-12-20 PROCEDURE — 99232 SBSQ HOSP IP/OBS MODERATE 35: CPT | Performed by: INTERNAL MEDICINE

## 2024-12-20 PROCEDURE — 99214 OFFICE O/P EST MOD 30 MIN: CPT | Performed by: INTERNAL MEDICINE

## 2024-12-20 PROCEDURE — 85027 COMPLETE CBC AUTOMATED: CPT | Performed by: INTERNAL MEDICINE

## 2024-12-20 PROCEDURE — 80048 BASIC METABOLIC PNL TOTAL CA: CPT | Performed by: PHYSICIAN ASSISTANT

## 2024-12-20 RX ADMIN — ONDANSETRON 4 MG: 2 INJECTION INTRAMUSCULAR; INTRAVENOUS at 19:22

## 2024-12-20 RX ADMIN — GABAPENTIN 200 MG: 100 CAPSULE ORAL at 08:36

## 2024-12-20 RX ADMIN — APIXABAN 5 MG: 5 TABLET, FILM COATED ORAL at 08:36

## 2024-12-20 RX ADMIN — Medication 2.5 MG: at 20:45

## 2024-12-20 RX ADMIN — ALLOPURINOL 100 MG: 100 TABLET ORAL at 08:36

## 2024-12-20 RX ADMIN — ATORVASTATIN CALCIUM 80 MG: 80 TABLET, FILM COATED ORAL at 08:36

## 2024-12-20 RX ADMIN — SEVELAMER HYDROCHLORIDE 800 MG: 800 TABLET, FILM COATED ORAL at 17:31

## 2024-12-20 RX ADMIN — ASPIRIN 81 MG CHEWABLE TABLET 81 MG: 81 TABLET CHEWABLE at 08:36

## 2024-12-20 RX ADMIN — NIFEDIPINE 30 MG: 30 TABLET, EXTENDED RELEASE ORAL at 17:31

## 2024-12-20 RX ADMIN — SEVELAMER HYDROCHLORIDE 800 MG: 800 TABLET, FILM COATED ORAL at 11:42

## 2024-12-20 RX ADMIN — APIXABAN 5 MG: 5 TABLET, FILM COATED ORAL at 17:31

## 2024-12-20 RX ADMIN — PANTOPRAZOLE SODIUM 40 MG: 40 TABLET, DELAYED RELEASE ORAL at 05:14

## 2024-12-20 RX ADMIN — GABAPENTIN 200 MG: 100 CAPSULE ORAL at 20:45

## 2024-12-20 RX ADMIN — SEVELAMER HYDROCHLORIDE 800 MG: 800 TABLET, FILM COATED ORAL at 07:51

## 2024-12-20 RX ADMIN — POLYETHYLENE GLYCOL 3350 17 G: 17 POWDER, FOR SOLUTION ORAL at 08:36

## 2024-12-20 NOTE — ASSESSMENT & PLAN NOTE
ESRD on HD TTS.  Missed dialysis treatment Tuesday and Thursday.  Underwent urgent dialysis on day of arrival.    Results from last 7 days   Lab Units 12/20/24  0515 12/19/24  1018   BUN mg/dL 50* 92*   CREATININE mg/dL 6.29* 9.32*   EGFR ml/min/1.73sq m 8 5

## 2024-12-20 NOTE — ASSESSMENT & PLAN NOTE
Hemoglobin below goal but stable.  He is on Mircera 100 mcg every 2 weeks and Venofer weekly in OP HD.

## 2024-12-20 NOTE — PROGRESS NOTES
Progress Note - Hospitalist   Name: Naresh Carpio 64 y.o. male I MRN: [id number]  Unit/Bed#: 4 [address] I Date of Admission: 12/19/2024   Date of Service: 12/20/2024 I Hospital Day: 0    Assessment & Plan  Hyperkalemia  History of diabetes mellitus paroxysmal atrial fibrillation ESRD on HD TTS and PAD who presented to the hospital after missing dialysis  The patient reports generalized weakness and flulike symptoms including diarrhea since the weekend.  He missed treatment on Tuesday and Thursday.  Recently his losartan was discontinued due to hyperkalemia  Requiring urgent dialysis on admission.  Planning HD tomorrow discharge    Results from last 7 days   Lab Units 12/20/24  0515 12/19/24  1018   POTASSIUM mmol/L 5.2 7.0*     Type II diabetes mellitus (HCC)  Lab Results   Component Value Date    HGBA1C 5.5 10/08/2024     Results from last 7 days   Lab Units 12/20/24  0515 12/19/24  1018   GLUCOSE RANDOM mg/dL 77 128     Diet controlled.  Monitor with a.m. labs  Hypertension  Continue nifedipine  PAD (peripheral artery disease) (HCC)  PAD with recent finding of bilateral popliteal stenoses  Was seen by vascular surgery and patient declined intervention at that time  Continue aspirin and atorvastatin.  ESRD (end stage renal disease) (HCC)  ESRD on HD TTS.  Missed dialysis treatment Tuesday and Thursday.  Underwent urgent dialysis on day of arrival.    Results from last 7 days   Lab Units 12/20/24  0515 12/19/24  1018   BUN mg/dL 50* 92*   CREATININE mg/dL 6.29* 9.32*   EGFR ml/min/1.73sq m 8 5     Paroxysmal atrial fibrillation (HCC)  Rate controlled.  Anticoagulation: Recently restarted on apixaban  Renal lesion  Right renal hypodense lesion measuring 1.7 cm with density greater than simple fluid, noting limited evaluation due to artifact, increased in size since 2022. Nonemergent ultrasound is recommended for further evaluation.   Chronic kidney disease-mineral and bone disorder  .  Anemia due to chronic  kidney disease, on chronic dialysis (HCC)  .  Hyperphosphatemia    Chronic wound      VTE Pharmacologic Prophylaxis: VTE Score: 3 Moderate Risk (Score 3-4) - Pharmacological DVT Prophylaxis Ordered: apixaban (Eliquis).    Mobility:   Basic Mobility Inpatient Raw Score: 23  JH-HLM Goal: 7: Walk 25 feet or more  JH-HLM Achieved: 7: Walk 25 feet or more  JH-HLM Goal achieved. Continue to encourage appropriate mobility.    Patient Centered Rounds: I have performed bedside rounds with nursing staff today.  Discussions with Specialists or Other Care Team Provider: Case management and nephrology    Education and Discussions with Family / Patient: Patient declined call to .     Current Length of Stay: 0 day(s)  Current Patient Status: Observation   Certification Statement: The patient, admitted on an observation basis, will now require > 2 midnight hospital stay due to only got partial treatment yesterday.  Needs full treatment tomorrow before discharge  Discharge Plan: Anticipate discharge tomorrow to home.    Code Status: Level 1 - Full Code    Subjective   Patient seen and examined.  No complaints, feeling slightly better.    Objective   Vitals:   Temp (24hrs), Av.8 °F (36.6 °C), Min:97.5 °F (36.4 °C), Max:98.2 °F (36.8 °C)    Temp:  [97.5 °F (36.4 °C)-98.2 °F (36.8 °C)] 97.8 °F (36.6 °C)  HR:  [53-75] 75  Resp:  [13-20] 18  BP: (106-195)/(67-86) 165/82  SpO2:  [92 %-97 %] 92 %  Body mass index is 30.08 kg/m².     Input and Output Summary (last 24 hours):     Intake/Output Summary (Last 24 hours) at 2024 1044  Last data filed at 2024 0614  Gross per 24 hour   Intake 960 ml   Output 3925 ml   Net -2965 ml       Physical Exam  Vitals reviewed.   Constitutional:       General: He is not in acute distress.  HENT:      Head: Atraumatic.   Cardiovascular:      Rate and Rhythm: Regular rhythm.   Pulmonary:      Effort: Pulmonary effort is normal.      Breath sounds: No wheezing.   Abdominal:       General: Bowel sounds are normal.      Palpations: Abdomen is soft.      Tenderness: There is no abdominal tenderness.   Musculoskeletal:         General: No swelling.   Skin:     General: Skin is warm and dry.   Neurological:      General: No focal deficit present.      Mental Status: He is alert.      Motor: No weakness.   Psychiatric:         Mood and Affect: Mood normal.       Lines/Drains:  Invasive Devices       Peripheral Intravenous Line  Duration             Peripheral IV 12/19/24 Right Antecubital 1 day              Hemodialysis Catheter  Duration             HD Permanent Double Catheter -- days                      Telemetry:  Telemetry Orders (From admission, onward)               24 Hour Telemetry Monitoring  Continuous x 24 Hours (Telem)        Expiring   Question:  Reason for 24 Hour Telemetry  Answer:  Metabolic/electrolyte disturbance with high probability of dysrhythmia. K level <3 or >6 OR KCL infusion >10mEq/hr                     Telemetry Reviewed:   Indication for Continued Telemetry Use:                Lab Results: I have reviewed the following results:   Results from last 7 days   Lab Units 12/20/24  0515 12/19/24  1018   WBC Thousand/uL 6.77 6.36   HEMOGLOBIN g/dL 8.6* 8.1*   PLATELETS Thousands/uL 220 219   MCV fL 95 98   INR   --  1.20*     Results from last 7 days   Lab Units 12/20/24  0515 12/19/24  1018   SODIUM mmol/L 134* 134*   POTASSIUM mmol/L 5.2 7.0*   CHLORIDE mmol/L 97 102   CO2 mmol/L 27 22   ANION GAP mmol/L 10 10   BUN mg/dL 50* 92*   CREATININE mg/dL 6.29* 9.32*   CALCIUM mg/dL 8.4 8.3*   ALBUMIN g/dL  --  3.7   TOTAL BILIRUBIN mg/dL  --  0.28   ALK PHOS U/L  --  113*   ALT U/L  --  74*   AST U/L  --  47*   EGFR ml/min/1.73sq m 8 5   GLUCOSE RANDOM mg/dL 77 128     Results from last 7 days   Lab Units 12/19/24  1018   MAGNESIUM mg/dL 2.2               Recent Cultures (last 7 days):         Imaging:  Reviewed radiology reports from this admission including:  CT abdomen  pelvis wo contrast  Result Date: 12/19/2024  Impression: 1. Mild diffuse urinary bladder wall thickening. Correlate with urinalysis. Otherwise, no acute abnormality in the abdomen or pelvis. 2. Small volume ascites. Partially visualized moderate bilateral pleural effusions and bibasilar atelectasis. 3. Right renal hypodense lesion measuring 1.7 cm with density greater than simple fluid, noting limited evaluation due to artifact, increased in size since 2022. Nonemergent ultrasound is recommended for further evaluation. The study was marked in EPIC for immediate notification. Workstation performed: [id number]       Last 24 Hours Medication List:     Current Facility-Administered Medications:     acetaminophen (TYLENOL) tablet 650 mg, Q4H PRN    allopurinol (ZYLOPRIM) tablet 100 mg, Daily    apixaban (ELIQUIS) tablet 5 mg, BID    aspirin chewable tablet 81 mg, Daily    atorvastatin (LIPITOR) tablet 80 mg, Daily    gabapentin (NEURONTIN) capsule 200 mg, Q12H KEMAL    NIFEdipine (PROCARDIA XL) 24 hr tablet 30 mg, After Dinner    ondansetron (ZOFRAN) injection 4 mg, Q4H PRN    oxyCODONE (ROXICODONE) split tablet 2.5 mg, Q4H PRN    pantoprazole (PROTONIX) EC tablet 40 mg, Early Morning    polyethylene glycol (MIRALAX) packet 17 g, Daily    sevelamer (RENAGEL) tablet 800 mg, TID With Meals    Administrative Statements   Today, Patient Was Seen By: Freddy Carrasquillo, DO  I have spent a total time of 35 minutes in caring for this patient on the day of the visit/encounter including Patient and family education, Counseling / Coordination of care, Documenting in the medical record, Reviewing / ordering tests, medicine, procedures  , Obtaining or reviewing history  , and Communicating with other healthcare professionals .    **Please Note: This note may have been constructed using a voice recognition system.**

## 2024-12-20 NOTE — PROGRESS NOTES
NEPHROLOGY HOSPITAL PROGRESS NOTE   Naresh Carpio 64 y.o. male MRN: [id number]  Unit/Bed#: [address] [place] [address] Encounter: [id number]  Reason for Consult: ESRD on HD    Assessment & Plan  ESRD (end stage renal disease) (HCC)  [facility] TTS  Access: R IJ PermCatsteve  EDW: 99 kg  Missed 2 HD tx prior to admission and admitted with K of 7.0 requiring urgent HD.   S/p HD on 12/19/24.   Next HD is tomorrow.     Hyperkalemia  Due to missed HD.  K was 7.0 on admission.  Resolved after urgent HD.  Continue low K diet.    Hypertension  BP high.   Home Rx: Nifedipine 30 mg daily  Monitor BP with trying to get to EDW.     Anemia due to chronic kidney disease, on chronic dialysis ([facility])  Hemoglobin below goal but stable.  He is on Mircera 100 mcg every 2 weeks and Venofer weekly in OP HD.     Chronic kidney disease-mineral and bone disorder  Continue renal diet.  Check phosphorus.  Rx: Sevelamer 800 mg TID.     Renal lesion  Needs nonemergent renal ultrasound.      TODAY's DISCUSSION / PLAN:  Next HD is tomorrow.  No new renal recommendations.    I have reviewed the nephrology recommendations including planning for HD tomorrow before discharge, with Dr. Carrasquillo, and we are in agreement with renal plan including the information outlined above.    SUBJECTIVE / 24H INTERVAL HISTORY:  Tolerated HD yesterday.  No acute complaints today.    OBJECTIVE:  Current Weight: Weight - Scale: 103 kg (228 lb)  Vitals:    12/19/24 2229 12/20/24 0338 12/20/24 0600 12/20/24 0749   BP: 156/77 157/76  165/82   BP Location: Left arm Left arm     Pulse: 67 67  75   Resp: 18 18     Temp: 98.2 °F (36.8 °C)   97.8 °F (36.6 °C)   TempSrc: Oral      SpO2: 96% 93%  92%   Weight:   103 kg (228 lb)    Height:           Intake/Output Summary (Last 24 hours) at 12/20/2024 1018  Last data filed at 12/20/2024 0614  Gross per 24 hour   Intake 960 ml   Output 3925 ml   Net -2965 ml     General: conscious, cooperative, no distress  Skin: dry  Eyes: pink  conjunctivae  ENT: moist mucous membranes  Respiratory: equal chest expansion, clear breath sounds.  Cardiovascular: distinct heart sounds, normal rate, regular rhythm, no rub  Abdomen: soft, non tender, non distended, normal bowel sounds  Extremities: mild LE edema.   Genitourinary: no gaitan catheter.   Neuro: awake, alert.   Psych: appropriate affect    Medications:    Current Facility-Administered Medications:     acetaminophen (TYLENOL) tablet 650 mg, 650 mg, Oral, Q4H PRN, Freddy Foreign, DO    allopurinol (ZYLOPRIM) tablet 100 mg, 100 mg, Oral, Daily, Freddy Foreign, DO, 100 mg at 12/20/24 0836    apixaban (ELIQUIS) tablet 5 mg, 5 mg, Oral, BID, Freddy Foreign, DO, 5 mg at 12/20/24 0836    aspirin chewable tablet 81 mg, 81 mg, Oral, Daily, Freddy Foreign, DO, 81 mg at 12/20/24 0836    atorvastatin (LIPITOR) tablet 80 mg, 80 mg, Oral, Daily, Freddy Foreign, DO, 80 mg at 12/20/24 0836    gabapentin (NEURONTIN) capsule 200 mg, 200 mg, Oral, Q12H KEMAL, Freddy Foreign, DO, 200 mg at 12/20/24 0836    NIFEdipine (PROCARDIA XL) 24 hr tablet 30 mg, 30 mg, Oral, After Dinner, Freddy Brockng, DO, 30 mg at 12/19/24 2126    ondansetron (ZOFRAN) injection 4 mg, 4 mg, Intravenous, Q4H PRN, Freddy Foreign, DO    oxyCODONE (ROXICODONE) split tablet 2.5 mg, 2.5 mg, Oral, Q4H PRN, Freddy Foreign, DO, 2.5 mg at 12/19/24 2126    pantoprazole (PROTONIX) EC tablet 40 mg, 40 mg, Oral, Early Morning, Freddy Foreign, DO, 40 mg at 12/20/24 0514    polyethylene glycol (MIRALAX) packet 17 g, 17 g, Oral, Daily, Freddy Foreign, DO, 17 g at 12/20/24 0836    sevelamer (RENAGEL) tablet 800 mg, 800 mg, Oral, TID With Meals, Freddy Foreign, DO, 800 mg at 12/20/24 0751    Laboratory Results:  Results from last 7 days   Lab Units 12/20/24  0515 12/19/24  1018   WBC Thousand/uL 6.77 6.36   HEMOGLOBIN g/dL 8.6* 8.1*   HEMATOCRIT % 26.7* 26.4*   PLATELETS Thousands/uL 220 219   POTASSIUM mmol/L 5.2 7.0*   CHLORIDE mmol/L 97 102   CO2 mmol/L 27 22   BUN mg/dL 50*  "92*   CREATININE mg/dL 6.29* 9.32*   CALCIUM mg/dL 8.4 8.3*   MAGNESIUM mg/dL  --  2.2     Portions of the record may have been created with voice recognition software. Occasional wrong word or \"sound a like\" substitutions may have occurred due to the inherent limitations of voice recognition software. Read the chart carefully and recognize, using context, where substitutions have occurred.If you have any questions, please contact the dictating provider.    "

## 2024-12-20 NOTE — ASSESSMENT & PLAN NOTE
[facility] TTS  Access: R IJ PermCath  EDW: 99 kg  Missed 2 HD tx prior to admission and admitted with K of 7.0 requiring urgent HD.   S/p HD on 12/19/24.   Next HD is tomorrow.

## 2024-12-20 NOTE — ASSESSMENT & PLAN NOTE
History of diabetes mellitus paroxysmal atrial fibrillation ESRD on HD TTS and PAD who presented to the hospital after missing dialysis  The patient reports generalized weakness and flulike symptoms including diarrhea since the weekend.  He missed treatment on Tuesday and Thursday.  Recently his losartan was discontinued due to hyperkalemia  Requiring urgent dialysis on admission.  Planning HD tomorrow discharge    Results from last 7 days   Lab Units 12/20/24  0515 12/19/24  1018   POTASSIUM mmol/L 5.2 7.0*

## 2024-12-20 NOTE — ASSESSMENT & PLAN NOTE
Lab Results   Component Value Date    HGBA1C 5.5 10/08/2024     Results from last 7 days   Lab Units 12/20/24  0515 12/19/24  1018   GLUCOSE RANDOM mg/dL 77 128     Diet controlled.  Monitor with a.m. labs

## 2024-12-21 ENCOUNTER — APPOINTMENT (INPATIENT)
Dept: DIALYSIS | Facility: HOSPITAL | Age: 64
End: 2024-12-21
Payer: MEDICARE

## 2024-12-21 VITALS
HEART RATE: 67 BPM | WEIGHT: 229 LBS | TEMPERATURE: 96.6 F | HEIGHT: 73 IN | BODY MASS INDEX: 30.35 KG/M2 | RESPIRATION RATE: 16 BRPM | DIASTOLIC BLOOD PRESSURE: 69 MMHG | OXYGEN SATURATION: 92 % | SYSTOLIC BLOOD PRESSURE: 165 MMHG

## 2024-12-21 LAB
ANION GAP SERPL CALCULATED.3IONS-SCNC: 11 MMOL/L (ref 4–13)
BUN SERPL-MCNC: 65 MG/DL (ref 5–25)
CALCIUM SERPL-MCNC: 8.1 MG/DL (ref 8.4–10.2)
CHLORIDE SERPL-SCNC: 97 MMOL/L (ref 96–108)
CO2 SERPL-SCNC: 24 MMOL/L (ref 21–32)
CREAT SERPL-MCNC: 7.74 MG/DL (ref 0.6–1.3)
GFR SERPL CREATININE-BSD FRML MDRD: 6 ML/MIN/1.73SQ M
GLUCOSE SERPL-MCNC: 101 MG/DL (ref 65–140)
MRSA NOSE QL CULT: ABNORMAL
MRSA NOSE QL CULT: ABNORMAL
PHOSPHATE SERPL-MCNC: 6.4 MG/DL (ref 2.3–4.1)
POTASSIUM SERPL-SCNC: 6 MMOL/L (ref 3.5–5.3)
SODIUM SERPL-SCNC: 132 MMOL/L (ref 135–147)

## 2024-12-21 PROCEDURE — 99239 HOSP IP/OBS DSCHRG MGMT >30: CPT | Performed by: INTERNAL MEDICINE

## 2024-12-21 PROCEDURE — 99232 SBSQ HOSP IP/OBS MODERATE 35: CPT | Performed by: INTERNAL MEDICINE

## 2024-12-21 PROCEDURE — 80048 BASIC METABOLIC PNL TOTAL CA: CPT | Performed by: INTERNAL MEDICINE

## 2024-12-21 PROCEDURE — 84100 ASSAY OF PHOSPHORUS: CPT | Performed by: INTERNAL MEDICINE

## 2024-12-21 RX ADMIN — ALLOPURINOL 100 MG: 100 TABLET ORAL at 11:23

## 2024-12-21 RX ADMIN — Medication 2.5 MG: at 01:15

## 2024-12-21 RX ADMIN — SEVELAMER HYDROCHLORIDE 800 MG: 800 TABLET, FILM COATED ORAL at 08:10

## 2024-12-21 RX ADMIN — ASPIRIN 81 MG CHEWABLE TABLET 81 MG: 81 TABLET CHEWABLE at 11:23

## 2024-12-21 RX ADMIN — ATORVASTATIN CALCIUM 80 MG: 80 TABLET, FILM COATED ORAL at 11:23

## 2024-12-21 RX ADMIN — SEVELAMER HYDROCHLORIDE 800 MG: 800 TABLET, FILM COATED ORAL at 11:23

## 2024-12-21 RX ADMIN — APIXABAN 5 MG: 5 TABLET, FILM COATED ORAL at 11:23

## 2024-12-21 RX ADMIN — Medication 2.5 MG: at 11:26

## 2024-12-21 RX ADMIN — GABAPENTIN 200 MG: 100 CAPSULE ORAL at 11:23

## 2024-12-21 RX ADMIN — ONDANSETRON 4 MG: 2 INJECTION INTRAMUSCULAR; INTRAVENOUS at 00:30

## 2024-12-21 RX ADMIN — PANTOPRAZOLE SODIUM 40 MG: 40 TABLET, DELAYED RELEASE ORAL at 05:23

## 2024-12-21 NOTE — ASSESSMENT & PLAN NOTE
[facility] TTS  Access: R IJ PermCath  EDW: 108 kg (99 kg?)  Missed 2 HD tx prior to admission and admitted with K of 7.0 requiring urgent HD.   S/p HD on 12/19/24.   Plan for HD today.

## 2024-12-21 NOTE — PROGRESS NOTES
"NEPHROLOGY HOSPITAL PROGRESS NOTE   Naresh Carpio 64 y.o. male MRN: [id number]  Unit/Bed#: [address] [place] [address] Encounter: [id number]  Reason for Consult: ESRD on HD    Assessment & Plan  ESRD (end stage renal disease) (HCC)  [facility] TTS  Access: R RADHA Starks  EDW: 108 kg (99 kg?)  Missed 2 HD tx prior to admission and admitted with K of 7.0 requiring urgent HD.   S/p HD on 12/19/24.   Plan for HD today.    Hyperkalemia  Due to missed HD.  K was 7.0 on admission on 12/19/24  Resolved after urgent HD.  Continue low K diet.  Check labs today.     Hypertension  BP is close to goal.   Home Rx: Nifedipine 30 mg daily  Monitor BP with UF on HD.   No change to meds.     Anemia due to chronic kidney disease, on chronic dialysis ([facility])  Hemoglobin below goal but stable.  He is on Mircera 100 mcg every 2 weeks and Venofer weekly in OP HD.     Chronic kidney disease-mineral and bone disorder  Continue renal diet.  Check phos today.   Rx: Sevelamer 800 mg TID.     Renal lesion  Needs nonemergent renal ultrasound.      TODAY's DISCUSSION / PLAN:  HD today.   Check BMP and phos today.   Plan for 2.5 kg UF on HD today and adjust dry weight accordingly.   No objection to discharge from renal standpoint after HD today.  However, based on what he told me earlier, he may want to stay until Monday for HD.     SUBJECTIVE / 24H INTERVAL HISTORY:  No new acute events noted.   He denied any CP or SOB.   He said: \"Doc, do think I can stay here until Monday to get dialysis here in the hospital.\"    OBJECTIVE:  Current Weight: Weight - Scale: 104 kg (229 lb)  Vitals:    12/21/24 0239 12/21/24 0300 12/21/24 0600 12/21/24 0735   BP: 127/53   139/51   BP Location: Left arm      Pulse: (!) 39   (!) 52   Resp: 20   16   Temp:    97.7 °F (36.5 °C)   TempSrc:       SpO2: 99% 98%  96%   Weight:   104 kg (229 lb)    Height:           Intake/Output Summary (Last 24 hours) at 12/21/2024 0807  Last data filed at 12/21/2024 0035  Gross per 24 hour "   Intake 310 ml   Output 1075 ml   Net -765 ml     General: conscious, cooperative, no distress  Skin: dry  Eyes: pink conjunctivae  ENT: moist mucous membranes  Respiratory: equal chest expansion, clear breath sounds.  Cardiovascular: distinct heart sounds, normal rate, regular rhythm, no rub  Abdomen: soft, non tender, non distended, normal bowel sounds  Extremities: mild LE edema.   Genitourinary: no gaitan catheter.   Neuro: awake, alert.   Psych: appropriate affect    Medications:    Current Facility-Administered Medications:     acetaminophen (TYLENOL) tablet 650 mg, 650 mg, Oral, Q4H PRN, Freddy Foreign, DO    allopurinol (ZYLOPRIM) tablet 100 mg, 100 mg, Oral, Daily, Freddy Foreign, DO, 100 mg at 12/20/24 0836    apixaban (ELIQUIS) tablet 5 mg, 5 mg, Oral, BID, Freddy Foreign, DO, 5 mg at 12/20/24 1731    aspirin chewable tablet 81 mg, 81 mg, Oral, Daily, Freddy Foreign, DO, 81 mg at 12/20/24 0836    atorvastatin (LIPITOR) tablet 80 mg, 80 mg, Oral, Daily, Freddy Foreign, DO, 80 mg at 12/20/24 0836    gabapentin (NEURONTIN) capsule 200 mg, 200 mg, Oral, Q12H KEMAL, Freddy Foreign, DO, 200 mg at 12/20/24 2045    NIFEdipine (PROCARDIA XL) 24 hr tablet 30 mg, 30 mg, Oral, After Dinner, Freddy Foreign, DO, 30 mg at 12/20/24 1731    ondansetron (ZOFRAN) injection 4 mg, 4 mg, Intravenous, Q4H PRN, Freddy Foreign, DO, 4 mg at 12/21/24 0030    oxyCODONE (ROXICODONE) split tablet 2.5 mg, 2.5 mg, Oral, Q4H PRN, Freddy Foreign, DO, 2.5 mg at 12/21/24 0115    pantoprazole (PROTONIX) EC tablet 40 mg, 40 mg, Oral, Early Morning, Freddy Foreign, DO, 40 mg at 12/21/24 0523    polyethylene glycol (MIRALAX) packet 17 g, 17 g, Oral, Daily, Freddy Foreign, DO, 17 g at 12/20/24 0836    sevelamer (RENAGEL) tablet 800 mg, 800 mg, Oral, TID With Meals, Freddy Carrasquillo DO, 800 mg at 12/20/24 1731    Laboratory Results:  Results from last 7 days   Lab Units 12/20/24  0515 12/19/24  1018   WBC Thousand/uL 6.77 6.36   HEMOGLOBIN g/dL 8.6* 8.1*  "  HEMATOCRIT % 26.7* 26.4*   PLATELETS Thousands/uL 220 219   POTASSIUM mmol/L 5.2 7.0*   CHLORIDE mmol/L 97 102   CO2 mmol/L 27 22   BUN mg/dL 50* 92*   CREATININE mg/dL 6.29* 9.32*   CALCIUM mg/dL 8.4 8.3*   MAGNESIUM mg/dL  --  2.2     Portions of the record may have been created with voice recognition software. Occasional wrong word or \"sound a like\" substitutions may have occurred due to the inherent limitations of voice recognition software. Read the chart carefully and recognize, using context, where substitutions have occurred.If you have any questions, please contact the dictating provider.    "

## 2024-12-21 NOTE — ASSESSMENT & PLAN NOTE
Subjective:  Pt seen this AM, had c/o nausea and h/a. Pt switched to waterseal this AM, CXR on waterseal no PTX.    Vital Signs:  Vital Signs Last 24 Hrs  T(C): 36.5 (03-19-24 @ 12:30), Max: 36.9 (03-18-24 @ 20:30)  T(F): 97.7 (03-19-24 @ 12:30), Max: 98.4 (03-18-24 @ 20:30)  HR: 111 (03-19-24 @ 17:00) (103 - 129)  BP: 116/67 (03-19-24 @ 17:00) (105/60 - 139/94)  RR: 19 (03-19-24 @ 17:00) (16 - 27)  SpO2: 99% (03-19-24 @ 17:00) (94% - 100%) on (O2)    Telemetry/Alarms:    Relevant labs, radiology and Medications reviewed                        7.5    9.09  )-----------( 572      ( 19 Mar 2024 05:27 )             24.7     03-19    135  |  102  |  13  ----------------------------<  97  3.8   |  26  |  0.36<L>    Ca    9.1      19 Mar 2024 05:27  Phos  3.8     03-19  Mg     2.0     03-19    TPro  6.7  /  Alb  1.8<L>  /  TBili  0.3  /  DBili  x   /  AST  16  /  ALT  11<L>  /  AlkPhos  79  03-19      MEDICATIONS  (STANDING):  heparin   Injectable 5000 Unit(s) SubCutaneous every 8 hours  lidocaine   4% Patch 1 Patch Transdermal daily  lidocaine   4% Patch 2 Patch Transdermal daily  medroxyPROGESTERone 10 milliGRAM(s) Oral daily  senna 2 Tablet(s) Oral at bedtime  sodium chloride 0.9%. 1000 milliLiter(s) (75 mL/Hr) IV Continuous <Continuous>    MEDICATIONS  (PRN):  acetaminophen     Tablet .. 650 milliGRAM(s) Oral every 6 hours PRN Mild Pain (1 - 3)  acetaminophen   IVPB .. 750 milliGRAM(s) IV Intermittent once PRN Temp greater or equal to 38C (100.4F), Mild Pain (1 - 3)  ALPRAZolam 0.25 milliGRAM(s) Oral every 8 hours PRN anxiety  naloxone Injectable 0.1 milliGRAM(s) IV Push every 3 minutes PRN For ANY of the following changes in patient status:  A. RR LESS THAN 10 breaths per minute, B. Oxygen saturation LESS THAN 90%, C. Sedation score of 6  ondansetron Injectable 4 milliGRAM(s) IV Push every 6 hours PRN Nausea  oxyCODONE    IR 5 milliGRAM(s) Oral every 4 hours PRN Moderate Pain (4 - 6)  oxyCODONE    IR 10 milliGRAM(s) Oral every 6 hours PRN Severe Pain (7 - 10)  polyethylene glycol 3350 17 Gram(s) Oral two times a day PRN Constipation  zolpidem 5 milliGRAM(s) Oral at bedtime PRN Insomnia      Physical exam  Gen NAD  Neuro AAOx3  Card tachycardic  Pulm equal  Abd soft, distended  Ext  warm    Tubes: right pigtail no AL seen, left chest tube no AL seen    I&O's Summary    18 Mar 2024 07:01  -  19 Mar 2024 07:00  --------------------------------------------------------  IN: 1600 mL / OUT: 1155 mL / NET: 445 mL    19 Mar 2024 07:01  -  19 Mar 2024 17:25  --------------------------------------------------------  IN: 0 mL / OUT: 350 mL / NET: -350 mL        Assessment  54y Female  w/ PAST MEDICAL & SURGICAL HISTORY:  Fibroid      H/O oral surgery  under general anesthesia during childhood      S/P D&C (status post dilation and curettage)  x2      S/P laparoscopy  as part of infertility work up      S/P tonsillectomy      admitted with complaints of Patient is a 54y old  Female who presents with a chief complaint of Left side chest pain  L Pneumothorax (19 Mar 2024 11:26)  .  51 y/o female with a PMHx of fibroids, [place]. s/p  IR biopsy Rt lung nodule at [facility] 2/26 .  PT currently undergoing workup for recently diagnosed endometrial cancer presents to the ED c/o left sided CP. Pt reports she had a pain in her left chest that felt like a gas bubble that was progressively worsening. Denies rash. No other complaints at this time.  Pt noted to have a moderate PTX on left side . Now s/p Left pigtail placement  3/11 , Rt Pigtail placed 3/13  s/p 3/15 Left Robot assist Pleurectomy/TALC pleurodesis wedge resection     Plan   pigtail and CT to waterseal  clamp trial overnight, CXR 9pm and in AM  Heme/onc following, plan for IR biopsy of uterus  trend CBC, 1 unit of blood ordered   Tachycardia   pain control    Discussed with Cardiothoracic Team at AM rounds.   Due to missed HD.  K was 7.0 on admission on 12/19/24  Resolved after urgent HD.  Continue low K diet.  Check labs today.

## 2024-12-21 NOTE — ASSESSMENT & PLAN NOTE
History of diabetes mellitus paroxysmal atrial fibrillation ESRD on HD TTS and PAD who presented to the hospital after missing dialysis  The patient reports generalized weakness and flulike symptoms including diarrhea since the weekend.  He missed treatment on Tuesday and Thursday.  Recently his losartan was discontinued due to hyperkalemia  Requiring urgent dialysis on admission.  Patient status post hemodialysis today and is being discharged with instructions for modified dialysis schedule next week    Results from last 7 days   Lab Units 12/21/24  0825 12/20/24  0515 12/19/24  1018   POTASSIUM mmol/L 6.0* 5.2 7.0*

## 2024-12-21 NOTE — DISCHARGE SUMMARY
Discharge Summary - Hospitalist   Name: Naresh Carpio 64 y.o. male I MRN: [id number]  Unit/Bed#: [address] [place] [address] I Date of Admission: 12/19/2024   Date of Service: 12/21/2024 I Hospital Day: 1    Assessment & Plan  Hyperkalemia  History of diabetes mellitus paroxysmal atrial fibrillation ESRD on HD TTS and PAD who presented to the hospital after missing dialysis  The patient reports generalized weakness and flulike symptoms including diarrhea since the weekend.  He missed treatment on Tuesday and Thursday.  Recently his losartan was discontinued due to hyperkalemia  Requiring urgent dialysis on admission.  Patient status post hemodialysis today and is being discharged with instructions for modified dialysis schedule next week    Results from last 7 days   Lab Units 12/21/24  0825 12/20/24  0515 12/19/24  1018   POTASSIUM mmol/L 6.0* 5.2 7.0*     Type II diabetes mellitus (HCC)  Lab Results   Component Value Date    HGBA1C 5.5 10/08/2024     Results from last 7 days   Lab Units 12/21/24  0825 12/20/24  0515 12/19/24  1018   GLUCOSE RANDOM mg/dL 101 77 128     Diet controlled.  Stable with a.m. labs  Hypertension  Continue nifedipine  PAD (peripheral artery disease) ([facility])  PAD with recent finding of bilateral popliteal stenoses  Was seen by vascular surgery and patient declined intervention at that time  Continue aspirin and atorvastatin.  ESRD (end stage renal disease) (HCC)  ESRD on HD TTS.  Missed dialysis treatment Tuesday and Thursday.  Underwent urgent dialysis on day of arrival.  Modified schedule next week due to holiday.    Results from last 7 days   Lab Units 12/21/24  0825 12/20/24  0515 12/19/24  1018   BUN mg/dL 65* 50* 92*   CREATININE mg/dL 7.74* 6.29* 9.32*   EGFR ml/min/1.73sq m 6 8 5     Paroxysmal atrial fibrillation (HCC)  Rate controlled.  Anticoagulation: Recently restarted on apixaban  Renal lesion  Right renal hypodense lesion measuring 1.7 cm with density greater than simple fluid, noting  limited evaluation due to artifact, increased in size since 2022. Nonemergent ultrasound is recommended for further evaluation.       Medical Problems       Resolved Problems  Date Reviewed: 12/20/2024   None        Discharging Physician / Practitioner: Freddy Carrasquillo DO  PCP: Jeffrey Jackson  Admission Date:   Admission Orders (From admission, onward)       Ordered        12/20/24 1048  INPATIENT ADMISSION  Once            12/19/24 1201  Place in Observation  Once                        Discharge Date: 12/21/24    Consultations During Hospital Stay:  IP CONSULT TO NEPHROLOGY     Procedures Performed:   * No surgery found *     Images:   CT abdomen pelvis wo contrast  Result Date: 12/19/2024  Impression: 1. Mild diffuse urinary bladder wall thickening. Correlate with urinalysis. Otherwise, no acute abnormality in the abdomen or pelvis. 2. Small volume ascites. Partially visualized moderate bilateral pleural effusions and bibasilar atelectasis. 3. Right renal hypodense lesion measuring 1.7 cm with density greater than simple fluid, noting limited evaluation due to artifact, increased in size since 2022. Nonemergent ultrasound is recommended for further evaluation. The study was marked in EPIC for immediate notification. Workstation performed: [id number]       Lab Results: I have reviewed the following results:  Results from last 7 days   Lab Units 12/20/24  0515 12/19/24  1018   WBC Thousand/uL 6.77 6.36   HEMOGLOBIN g/dL 8.6* 8.1*   HEMATOCRIT % 26.7* 26.4*   MCV fL 95 98   PLATELETS Thousands/uL 220 219   INR   --  1.20*     Results from last 7 days   Lab Units 12/21/24  0825 12/20/24  0515 12/19/24  1018   SODIUM mmol/L 132* 134* 134*   POTASSIUM mmol/L 6.0* 5.2 7.0*   CHLORIDE mmol/L 97 97 102   CO2 mmol/L 24 27 22   BUN mg/dL 65* 50* 92*   CREATININE mg/dL 7.74* 6.29* 9.32*   CALCIUM mg/dL 8.1* 8.4 8.3*   ALBUMIN g/dL  --   --  3.7   TOTAL BILIRUBIN mg/dL  --   --  0.28   ALK PHOS U/L  --   --  113*   ALT U/L  --   --   74*   AST U/L  --   --  47*   EGFR ml/min/1.73sq m 6 8 5   GLUCOSE RANDOM mg/dL 101 77 128             Results from last 7 days   Lab Units 12/19/24  1332   COLOR UA  Colorless   CLARITY UA  Clear   SPEC GRAV UA  1.015   PH UA  7.5   LEUKOCYTES UA  Small*   NITRITE UA  Negative   GLUCOSE UA mg/dl 70 (7/100%)*   KETONES UA mg/dl Negative   BLOOD UA  Trace*      Results from last 7 days   Lab Units 12/19/24  1332   RBC UA /hpf 0-1   WBC UA /hpf 1-2   EPITHELIAL CELLS WET PREP /hpf None Seen   BACTERIA UA /hpf Occasional                Incidental Findings:   Right renal hypodense lesion measuring 1.7 cm with density greater than simple fluid, noting limited evaluation due to artifact, increased in size since 2022. Nonemergent ultrasound is recommended for further evaluation.      Test Results Pending at Discharge (will require follow up):   Pending Labs       Order Current Status    MRSA culture In process           Reason for Admission:   Weakness - Generalized (Pt arrived EMS. Pt reports flu like symptoms 3 days. Pt reports missing dialysis Tuesday and has increased weakness w/ dyspnea upon exertion. )    Hospital Course:   Naresh Carpio is a 64 y.o. male patient who originally presented to the hospital on 12/19/2024 due to generalized weakness.  He is supposed to get dialysis TTS.  He missed his Tuesday and subsequently Thursday sessions due to having flulike symptoms.  In the ED he was hyperkalemic and required urgent dialysis.  He has been dialyzed today Saturday and is back on schedule.  There are no further inpatient hospitalization needs and the patient is being discharged home.    Please see above list of diagnoses and related plan for additional information.     Condition at Discharge: stable     Discharge Day Visit / Exam:   Subjective: Patient seen and examined.  No new complaints.    Vitals: Blood Pressure: 164/71 (12/21/24 0930)  Pulse: 66 (12/21/24 0930)  Temperature: (!) 96.4 °F (35.8 °C) (12/21/24  "0820)  Temp Source: Tympanic (12/21/24 0820)  Respirations: 16 (12/21/24 0930)  Height: 6' 1\" (185.4 cm) (12/19/24 1452)  Weight - Scale: 104 kg (229 lb) (12/21/24 0600)  SpO2: 97 % (12/21/24 0930)    Exam:   Physical Exam  Vitals reviewed.   Constitutional:       General: He is not in acute distress.  HENT:      Head: Atraumatic.   Cardiovascular:      Rate and Rhythm: Regular rhythm.   Pulmonary:      Effort: Pulmonary effort is normal.      Breath sounds: Decreased breath sounds present. No wheezing.   Abdominal:      General: Bowel sounds are normal.      Palpations: Abdomen is soft.      Tenderness: There is no abdominal tenderness.   Musculoskeletal:      Right lower leg: Edema present.      Left lower leg: Edema present.   Skin:     General: Skin is warm and dry.   Neurological:      General: No focal deficit present.      Mental Status: He is alert.      Motor: No weakness.   Psychiatric:         Mood and Affect: Mood normal.       Discussion with Family: Patient declined call to family    Discharge instructions/Information to patient and family:   See after visit summary for information provided to patient and family.      Provisions for Follow-Up Care:  See after visit summary for information related to follow-up care and any pertinent home health orders.      Mobility at time of Discharge:  Basic Mobility Inpatient Raw Score: 23  JH-HLM Goal: 7: Walk 25 feet or more  JH-HLM Achieved: 2: Bed activities/Dependent transfer  JH-HLM Goal NOT achieved. Continue with multidisciplinary rounding and encourage appropriate mobility to improve upon JH-HLM goals.    Disposition:   Home    Planned Readmission: No     Discharge Medications:  See after visit summary for reconciled discharge medications provided to patient and family.      Administrative Statements   I spent 35 minutes discharging the patient. This time was spent on the day of discharge. I had direct contact with the patient on the day of discharge. " Greater than 50% of the total time was spent examining patient, answering all patient questions, arranging and discussing plan of care with patient as well as directly providing post-discharge instructions.  Additional time then spent on discharge activities.    **Please Note: This note may have been constructed using a voice recognition system**

## 2024-12-21 NOTE — PLAN OF CARE
Post-Dialysis RN Treatment Note    Blood Pressure:  Pre 167/71 mm/Hg  Post 165/69 mmHg   EDW:  99 kg    Weight:  Pre 104 kg   Post 99.5 kg   Mode of weight measurement: Bed Scale   Volume Removed:  4500 ml    Treatment duration: 210 minutes    NS given:  No    Treatment shortened No   Medications given during Rx: Not Applicable   Estimated Kt/V:  Not Applicable   Access type: Permacath/TDC   Needle Gauge:    Access Issues: No    Report called to primary nurse:   Yes /      Robinson Espinoza RN     [id number] ml as tolerated, 2K bath, 3.5 hrs  Problem: METABOLIC, FLUID AND ELECTROLYTES - ADULT  Goal: Electrolytes maintained within normal limits  Description: INTERVENTIONS:  - Monitor labs and assess patient for signs and symptoms of electrolyte imbalances  - Administer electrolyte replacement as ordered  - Monitor response to electrolyte replacements, including repeat lab results as appropriate  - Instruct patient on fluid and nutrition as appropriate  Outcome: Progressing  Goal: Fluid balance maintained  Description: INTERVENTIONS:  - Monitor labs   - Monitor I/O and WT  - Instruct patient on fluid and nutrition as appropriate  - Assess for signs & symptoms of volume excess or deficit  Outcome: Progressing

## 2024-12-21 NOTE — INCIDENTAL FINDINGS
The following findings require follow up:  Radiographic finding   Finding: Right renal hypodense lesion measuring 1.7 cm with density greater than simple fluid, noting limited evaluation due to artifact, increased in size since 2022. Nonemergent ultrasound is recommended for further evaluation.     Follow up required: nonemergent ultrasound   Follow up should be done within 4-6 week(s)    Please notify the following clinician to assist with the follow up:   Dr. Jeffrey Jackson

## 2024-12-21 NOTE — ASSESSMENT & PLAN NOTE
ESRD on HD TTS.  Missed dialysis treatment Tuesday and Thursday.  Underwent urgent dialysis on day of arrival.  Modified schedule next week due to holiday.    Results from last 7 days   Lab Units 12/21/24  0825 12/20/24  0515 12/19/24  1018   BUN mg/dL 65* 50* 92*   CREATININE mg/dL 7.74* 6.29* 9.32*   EGFR ml/min/1.73sq m 6 8 5

## 2024-12-21 NOTE — ASSESSMENT & PLAN NOTE
BP is close to goal.   Home Rx: Nifedipine 30 mg daily  Monitor BP with UF on HD.   No change to meds.

## 2024-12-21 NOTE — CASE MANAGEMENT
Case Management Assessment & Discharge Planning Note    Patient name Naresh Carpio  Location [address] [place] [address]/[address] [place] [address]-* MRN [id number]  : 1960 Date 2024       Current Admission Date: 2024  Current Admission Diagnosis:Hyperkalemia   Patient Active Problem List    Diagnosis Date Noted Date Diagnosed    Renal lesion 2024     Acute cough 2024     Chronic wound 2024     Acute on chronic diastolic (congestive) heart failure ([facility]) 2024     Pulmonary edema 2024     Pleural effusion 2024     Elevated troponin 2024     Hyperphosphatemia 2024     Fall 2024     Melena 2024     Paroxysmal atrial fibrillation ([facility])      ESRD (end stage renal disease) ([facility]) 10/25/2024     Nausea 10/20/2024     Hyponatremia 10/19/2024     Chronic kidney disease-mineral and bone disorder 10/14/2024     Falls 10/08/2024     PAD (peripheral artery disease) ([facility]) 10/08/2024     Closed fracture of right pelvis ([facility]) 2024     Bilateral sacral fracture, closed ([facility]) 2024     Hyperkalemia 2024     Difficulty with speech 2024     History of amputation of hallux ([facility]) 2024     Hypertensive urgency 2024     Facial cellulitis 2024     Constipation 2023     Bradycardia 2023     Cellulitis of right foot      Polymicrobial bacterial infection 2023     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 mellitus, with necrosis of bone ([facility])      Acquired deformity of foot, right      Charcot's joint      Subacute osteomyelitis of right foot ([facility])      Open wound of right foot 2023     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 mellitus, with bone involvement without evidence of necrosis ([facility])      Diabetic ulcer of left midfoot associated with type 2 diabetes mellitus, limited to breakdown of skin ([facility])      Diabetic polyneuropathy associated with type 2 diabetes mellitus ([facility])       Diabetes mellitus type 2 with peripheral artery disease (HCC)      History of amputation of lesser toe of left foot (HCC)      Onychomycosis      Status post fall 08/19/2023     Traumatic rhabdomyolysis (HCC) 08/19/2023     Leukocytosis 08/19/2023     Osteoarthritis of left hip 07/27/2022     Severe Left Orchalgia 07/26/2022     Right shoulder pain 07/26/2022     Left hip pain 07/06/2022     Hemodialysis status (HCC)      Hypervolemia      Anemia due to chronic kidney disease, on chronic dialysis (HCC) 01/21/2022     Type II diabetes mellitus (HCC)      Hypertension      History of TIA (transient ischemic attack)      T10 vertebral fracture (HCC)        LOS (days): 1  Geometric Mean LOS (GMLOS) (days): 3.6  Days to GMLOS:2.6     OBJECTIVE:  PATIENT READMITTED TO HOSPITAL  Risk of Unplanned Readmission Score: 38.19         Current admission status: Inpatient  Referral Reason: Other (Discharge planning)    Preferred Pharmacy:   [id number] - [facility] [address] [place] [address]  [address] [place] [id number]  Phone: [phone] Fax: [phone]    Primary Care Provider: Jeffrey Jackson    Primary Insurance: MEDICARE  Secondary Insurance:     ASSESSMENT:  Active Health Care Proxies       Edi Carpioia Health Care Representative - Sister   Primary Phone: [phone] (Mobile)                  Obs Notice Signed: 12/19/24    Readmission Root Cause  30 Day Readmission: Yes  During your hospital stay, did someone (provider, nurse, ) explain your care to you in a way you could understand?: Yes  Did you feel medically stable to leave the hospital?: Yes  Were you able to pay for your medication at the pharmacy?: Yes  Did you have reliable transportation to take you to your appointments?: Yes  During previous admission, was a post-acute recommendation made?: No  Patient was readmitted due to: Hyperkalemia  Action Plan: Medical management, HD, return home when medically cleared    Patient  Information  Admitted from:: Home  Mental Status: Alert  During Assessment patient was accompanied by: Not accompanied during assessment  Assessment information provided by:: Patient  Primary Caregiver: Self  Support Systems: Family members  [place] Residence[place] [facility] [place]  What [facility] do you live in?: [place]  Home entry access options. Select all that apply.: Stairs  Number of steps to enter home.: One Flight  Type of Current Residence: Apartment  Floor Level: 2  Upon entering residence, is there a bedroom on the main floor (no further steps)?: Yes  Upon entering residence, is there a bathroom on the main floor (no further steps)?: Yes  Living Arrangements: Lives Alone    Activities of Daily Living Prior to Admission  Functional Status: Independent  Completes ADLs independently?: Yes  Ambulates independently?: Yes  Does patient use assisted devices?: Yes  Assisted Devices (DME) used: Straight Cane, Walker  Does patient currently own DME?: Yes  What DME does the patient currently own?: Straight Cane, Walker  Does the patient have a history of Short-Term Rehab?: Yes ([facility])  Does patient have a history of HHC?: No  Does patient currently have HHC?: No    Patient Information Continued  Income Source: SSI/SSD  Does patient have prescription coverage?: Yes  Does patient receive dialysis treatments?: Yes ([facility], [facility], [facility])    Means of Transportation  [facility]      DISCHARGE DETAILS:    Discharge planning discussed with:: Patient  Freedom of Choice: Yes  Comments - Freedom of Choice: Plan is for patient to return home at discharge.     Were Treatment Team discharge recommendations reviewed with patient/caregiver?: Yes  Did patient/caregiver verbalize understanding of patient care needs?: Yes  Were patient/caregiver advised of the risks associated with not following Treatment Team discharge recommendations?: Yes    Requested Home Health Care         Is the patient  interested in HHC at discharge?: No    DME Referral Provided  Referral made for DME?: No    Other Referral/Resources/Interventions Provided:  Interventions: Transportation    Would you like to participate in our [facility] service program?  : No - Declined    Treatment Team Recommendation: Home  Discharge Destination Plan:: Home  Transport at Discharge : Wheelchair van     Number/Name of Dispatcher: [employer]  Transported by (Company and Unit #): TBD  ETA of Transport (Date): 12/21/24  ETA of Transport (Time): 1300 (requested, pickup time pending)         IMM Given (Date):: 12/20/24

## 2024-12-21 NOTE — ASSESSMENT & PLAN NOTE
Lab Results   Component Value Date    HGBA1C 5.5 10/08/2024     Results from last 7 days   Lab Units 12/21/24  0825 12/20/24  0515 12/19/24  1018   GLUCOSE RANDOM mg/dL 101 77 128     Diet controlled.  Stable with a.m. labs

## 2025-01-01 PROBLEM — R05.1 ACUTE COUGH: Status: RESOLVED | Noted: 2024-11-28 | Resolved: 2025-01-01

## 2025-01-16 ENCOUNTER — APPOINTMENT (OUTPATIENT)
Dept: DIALYSIS | Facility: HOSPITAL | Age: 65
DRG: 640 | End: 2025-01-16
Payer: MEDICARE

## 2025-01-16 ENCOUNTER — HOSPITAL ENCOUNTER (INPATIENT)
Facility: HOSPITAL | Age: 65
LOS: 1 days | Discharge: HOME | DRG: 640 | End: 2025-01-18
Admitting: INTERNAL MEDICINE
Payer: MEDICARE

## 2025-01-16 DIAGNOSIS — R00.1 BRADYCARDIA: ICD-10-CM

## 2025-01-16 DIAGNOSIS — N18.6 ESRD (END STAGE RENAL DISEASE) (HCC): ICD-10-CM

## 2025-01-16 DIAGNOSIS — R68.89 FLU-LIKE SYMPTOMS: ICD-10-CM

## 2025-01-16 DIAGNOSIS — E87.5 HYPERKALEMIA: ICD-10-CM

## 2025-01-16 DIAGNOSIS — R53.1 GENERALIZED WEAKNESS: Primary | ICD-10-CM

## 2025-01-16 PROBLEM — E83.9 CHRONIC KIDNEY DISEASE-MINERAL BONE DISORDER (CKD-MBD) WITH STAGE 5 CHRONIC KIDNEY DISEASE, ON CHRONIC DIALYSIS (HCC): Status: ACTIVE | Noted: 2025-01-16

## 2025-01-16 PROBLEM — Z99.2 ESRD (END STAGE RENAL DISEASE) ON DIALYSIS (HCC): Status: ACTIVE | Noted: 2025-01-16

## 2025-01-16 PROBLEM — D63.1 ANEMIA IN ESRD (END-STAGE RENAL DISEASE)  (HCC): Status: ACTIVE | Noted: 2025-01-16

## 2025-01-16 PROBLEM — N18.5 CHRONIC KIDNEY DISEASE-MINERAL BONE DISORDER (CKD-MBD) WITH STAGE 5 CHRONIC KIDNEY DISEASE, ON CHRONIC DIALYSIS (HCC): Status: ACTIVE | Noted: 2025-01-16

## 2025-01-16 PROBLEM — Z99.2 CHRONIC KIDNEY DISEASE-MINERAL BONE DISORDER (CKD-MBD) WITH STAGE 5 CHRONIC KIDNEY DISEASE, ON CHRONIC DIALYSIS (HCC): Status: ACTIVE | Noted: 2025-01-16

## 2025-01-16 PROBLEM — I10 PRIMARY HYPERTENSION: Status: ACTIVE | Noted: 2025-01-16

## 2025-01-16 PROBLEM — M89.9 CHRONIC KIDNEY DISEASE-MINERAL BONE DISORDER (CKD-MBD) WITH STAGE 5 CHRONIC KIDNEY DISEASE, ON CHRONIC DIALYSIS (HCC): Status: ACTIVE | Noted: 2025-01-16

## 2025-01-16 LAB
2HR DELTA HS TROPONIN: -1 NG/L
ALBUMIN SERPL BCG-MCNC: 4.2 G/DL (ref 3.5–5)
ALP SERPL-CCNC: 147 U/L (ref 34–104)
ALT SERPL W P-5'-P-CCNC: 94 U/L (ref 7–52)
ANION GAP SERPL CALCULATED.3IONS-SCNC: 17 MMOL/L (ref 4–13)
AST SERPL W P-5'-P-CCNC: 31 U/L (ref 13–39)
ATRIAL RATE: 35 BPM
BASOPHILS # BLD AUTO: 0.05 THOUSANDS/ΜL (ref 0–0.1)
BASOPHILS NFR BLD AUTO: 1 % (ref 0–1)
BILIRUB SERPL-MCNC: 0.43 MG/DL (ref 0.2–1)
BNP SERPL-MCNC: 1640 PG/ML (ref 0–100)
BUN SERPL-MCNC: 106 MG/DL (ref 5–25)
CALCIUM SERPL-MCNC: 7.6 MG/DL (ref 8.4–10.2)
CARDIAC TROPONIN I PNL SERPL HS: 26 NG/L (ref ?–50)
CARDIAC TROPONIN I PNL SERPL HS: 27 NG/L (ref ?–50)
CHLORIDE SERPL-SCNC: 101 MMOL/L (ref 96–108)
CO2 SERPL-SCNC: 18 MMOL/L (ref 21–32)
CREAT SERPL-MCNC: 11.1 MG/DL (ref 0.6–1.3)
EOSINOPHIL # BLD AUTO: 0.31 THOUSAND/ΜL (ref 0–0.61)
EOSINOPHIL NFR BLD AUTO: 5 % (ref 0–6)
ERYTHROCYTE [DISTWIDTH] IN BLOOD BY AUTOMATED COUNT: 17.1 % (ref 11.6–15.1)
FLUAV AG UPPER RESP QL IA.RAPID: NEGATIVE
FLUBV AG UPPER RESP QL IA.RAPID: NEGATIVE
GFR SERPL CREATININE-BSD FRML MDRD: 4 ML/MIN/1.73SQ M
GLUCOSE SERPL-MCNC: 110 MG/DL (ref 65–140)
HCT VFR BLD AUTO: 35.7 % (ref 36.5–49.3)
HGB BLD-MCNC: 10.8 G/DL (ref 12–17)
IMM GRANULOCYTES # BLD AUTO: 0.03 THOUSAND/UL (ref 0–0.2)
IMM GRANULOCYTES NFR BLD AUTO: 0 % (ref 0–2)
LIPASE SERPL-CCNC: 28 U/L (ref 11–82)
LYMPHOCYTES # BLD AUTO: 1.2 THOUSANDS/ΜL (ref 0.6–4.47)
LYMPHOCYTES NFR BLD AUTO: 18 % (ref 14–44)
MAGNESIUM SERPL-MCNC: 2.7 MG/DL (ref 1.9–2.7)
MCH RBC QN AUTO: 30.3 PG (ref 26.8–34.3)
MCHC RBC AUTO-ENTMCNC: 30.3 G/DL (ref 31.4–37.4)
MCV RBC AUTO: 100 FL (ref 82–98)
MONOCYTES # BLD AUTO: 0.64 THOUSAND/ΜL (ref 0.17–1.22)
MONOCYTES NFR BLD AUTO: 10 % (ref 4–12)
NEUTROPHILS # BLD AUTO: 4.51 THOUSANDS/ΜL (ref 1.85–7.62)
NEUTS SEG NFR BLD AUTO: 66 % (ref 43–75)
NRBC BLD AUTO-RTO: 0 /100 WBCS
P AXIS: 27 DEGREES
PHOSPHATE SERPL-MCNC: 9.1 MG/DL (ref 2.3–4.1)
PLATELET # BLD AUTO: 201 THOUSANDS/UL (ref 149–390)
PMV BLD AUTO: 10.8 FL (ref 8.9–12.7)
POTASSIUM SERPL-SCNC: 7.3 MMOL/L (ref 3.5–5.3)
PR INTERVAL: 216 MS
PROT SERPL-MCNC: 7.5 G/DL (ref 6.4–8.4)
QRS AXIS: -6 DEGREES
QRSD INTERVAL: 90 MS
QT INTERVAL: 622 MS
QTC INTERVAL: 474 MS
RBC # BLD AUTO: 3.57 MILLION/UL (ref 3.88–5.62)
SARS-COV+SARS-COV-2 AG RESP QL IA.RAPID: NEGATIVE
SODIUM SERPL-SCNC: 136 MMOL/L (ref 135–147)
T WAVE AXIS: 85 DEGREES
TSH SERPL DL<=0.05 MIU/L-ACNC: 3.01 UIU/ML (ref 0.45–4.5)
VENTRICULAR RATE: 35 BPM
WBC # BLD AUTO: 6.74 THOUSAND/UL (ref 4.31–10.16)

## 2025-01-16 PROCEDURE — 93005 ELECTROCARDIOGRAM TRACING: CPT

## 2025-01-16 PROCEDURE — 96376 TX/PRO/DX INJ SAME DRUG ADON: CPT

## 2025-01-16 PROCEDURE — 99285 EMERGENCY DEPT VISIT HI MDM: CPT | Performed by: INTERNAL MEDICINE

## 2025-01-16 PROCEDURE — 83735 ASSAY OF MAGNESIUM: CPT

## 2025-01-16 PROCEDURE — 87081 CULTURE SCREEN ONLY: CPT | Performed by: NURSE PRACTITIONER

## 2025-01-16 PROCEDURE — 96372 THER/PROPH/DIAG INJ SC/IM: CPT

## 2025-01-16 PROCEDURE — 87811 SARS-COV-2 COVID19 W/OPTIC: CPT

## 2025-01-16 PROCEDURE — 87147 CULTURE TYPE IMMUNOLOGIC: CPT | Performed by: NURSE PRACTITIONER

## 2025-01-16 PROCEDURE — 99285 EMERGENCY DEPT VISIT HI MDM: CPT

## 2025-01-16 PROCEDURE — 96375 TX/PRO/DX INJ NEW DRUG ADDON: CPT

## 2025-01-16 PROCEDURE — 83690 ASSAY OF LIPASE: CPT

## 2025-01-16 PROCEDURE — 84100 ASSAY OF PHOSPHORUS: CPT

## 2025-01-16 PROCEDURE — 80053 COMPREHEN METABOLIC PANEL: CPT

## 2025-01-16 PROCEDURE — NC001 PR NO CHARGE: Performed by: INTERNAL MEDICINE

## 2025-01-16 PROCEDURE — 99222 1ST HOSP IP/OBS MODERATE 55: CPT | Performed by: INTERNAL MEDICINE

## 2025-01-16 PROCEDURE — 87804 INFLUENZA ASSAY W/OPTIC: CPT

## 2025-01-16 PROCEDURE — G0257 UNSCHED DIALYSIS ESRD PT HOS: HCPCS

## 2025-01-16 PROCEDURE — 85025 COMPLETE CBC W/AUTO DIFF WBC: CPT

## 2025-01-16 PROCEDURE — 5A1D70Z PERFORMANCE OF URINARY FILTRATION, INTERMITTENT, LESS THAN 6 HOURS PER DAY: ICD-10-PCS | Performed by: INTERNAL MEDICINE

## 2025-01-16 PROCEDURE — 84484 ASSAY OF TROPONIN QUANT: CPT

## 2025-01-16 PROCEDURE — 83880 ASSAY OF NATRIURETIC PEPTIDE: CPT

## 2025-01-16 PROCEDURE — 36415 COLL VENOUS BLD VENIPUNCTURE: CPT

## 2025-01-16 PROCEDURE — 84443 ASSAY THYROID STIM HORMONE: CPT

## 2025-01-16 PROCEDURE — 96365 THER/PROPH/DIAG IV INF INIT: CPT

## 2025-01-16 RX ORDER — GABAPENTIN 100 MG/1
200 CAPSULE ORAL 2 TIMES DAILY
Status: DISCONTINUED | OUTPATIENT
Start: 2025-01-16 | End: 2025-01-18 | Stop reason: HOSPADM

## 2025-01-16 RX ORDER — ACETAMINOPHEN 325 MG/1
650 TABLET ORAL EVERY 6 HOURS PRN
Status: DISCONTINUED | OUTPATIENT
Start: 2025-01-16 | End: 2025-01-18 | Stop reason: HOSPADM

## 2025-01-16 RX ORDER — SEVELAMER HYDROCHLORIDE 800 MG/1
800 TABLET, FILM COATED ORAL
Status: DISCONTINUED | OUTPATIENT
Start: 2025-01-16 | End: 2025-01-18 | Stop reason: HOSPADM

## 2025-01-16 RX ORDER — CALCIUM GLUCONATE 20 MG/ML
1 INJECTION, SOLUTION INTRAVENOUS ONCE
Status: COMPLETED | OUTPATIENT
Start: 2025-01-16 | End: 2025-01-16

## 2025-01-16 RX ORDER — POLYETHYLENE GLYCOL 3350 17 G/17G
17 POWDER, FOR SOLUTION ORAL DAILY
Status: DISCONTINUED | OUTPATIENT
Start: 2025-01-16 | End: 2025-01-18 | Stop reason: HOSPADM

## 2025-01-16 RX ORDER — PANTOPRAZOLE SODIUM 40 MG/1
40 TABLET, DELAYED RELEASE ORAL
Status: DISCONTINUED | OUTPATIENT
Start: 2025-01-16 | End: 2025-01-18 | Stop reason: HOSPADM

## 2025-01-16 RX ORDER — ONDANSETRON 2 MG/ML
4 INJECTION INTRAMUSCULAR; INTRAVENOUS ONCE
Status: DISCONTINUED | OUTPATIENT
Start: 2025-01-16 | End: 2025-01-18 | Stop reason: HOSPADM

## 2025-01-16 RX ORDER — ATORVASTATIN CALCIUM 80 MG/1
80 TABLET, FILM COATED ORAL DAILY
Status: DISCONTINUED | OUTPATIENT
Start: 2025-01-16 | End: 2025-01-18 | Stop reason: HOSPADM

## 2025-01-16 RX ORDER — ACETAMINOPHEN 325 MG/1
650 TABLET ORAL ONCE
Status: DISCONTINUED | OUTPATIENT
Start: 2025-01-16 | End: 2025-01-18 | Stop reason: HOSPADM

## 2025-01-16 RX ORDER — ASPIRIN 81 MG/1
81 TABLET, CHEWABLE ORAL DAILY
Status: DISCONTINUED | OUTPATIENT
Start: 2025-01-16 | End: 2025-01-18 | Stop reason: HOSPADM

## 2025-01-16 RX ORDER — NIFEDIPINE 30 MG/1
30 TABLET, EXTENDED RELEASE ORAL
Status: DISCONTINUED | OUTPATIENT
Start: 2025-01-16 | End: 2025-01-18 | Stop reason: HOSPADM

## 2025-01-16 RX ORDER — ALLOPURINOL 100 MG/1
100 TABLET ORAL DAILY
Status: DISCONTINUED | OUTPATIENT
Start: 2025-01-16 | End: 2025-01-18 | Stop reason: HOSPADM

## 2025-01-16 RX ORDER — DEXTROSE MONOHYDRATE 25 G/50ML
25 INJECTION, SOLUTION INTRAVENOUS ONCE
Status: COMPLETED | OUTPATIENT
Start: 2025-01-16 | End: 2025-01-16

## 2025-01-16 RX ADMIN — ALLOPURINOL 100 MG: 100 TABLET ORAL at 17:55

## 2025-01-16 RX ADMIN — PANTOPRAZOLE SODIUM 40 MG: 40 TABLET, DELAYED RELEASE ORAL at 13:10

## 2025-01-16 RX ADMIN — APIXABAN 5 MG: 5 TABLET, FILM COATED ORAL at 13:11

## 2025-01-16 RX ADMIN — SODIUM CHLORIDE 500 ML: 0.9 INJECTION, SOLUTION INTRAVENOUS at 10:11

## 2025-01-16 RX ADMIN — DEXTROSE MONOHYDRATE 25 ML: 25 INJECTION, SOLUTION INTRAVENOUS at 11:21

## 2025-01-16 RX ADMIN — ASPIRIN 81 MG CHEWABLE TABLET 81 MG: 81 TABLET CHEWABLE at 13:10

## 2025-01-16 RX ADMIN — NIFEDIPINE 30 MG: 30 TABLET, EXTENDED RELEASE ORAL at 17:55

## 2025-01-16 RX ADMIN — INSULIN HUMAN 5 UNITS: 100 INJECTION, SOLUTION PARENTERAL at 11:28

## 2025-01-16 RX ADMIN — APIXABAN 5 MG: 5 TABLET, FILM COATED ORAL at 17:55

## 2025-01-16 RX ADMIN — GABAPENTIN 200 MG: 100 CAPSULE ORAL at 13:10

## 2025-01-16 RX ADMIN — GABAPENTIN 200 MG: 100 CAPSULE ORAL at 17:55

## 2025-01-16 RX ADMIN — SEVELAMER HYDROCHLORIDE 800 MG: 800 TABLET, FILM COATED ORAL at 17:55

## 2025-01-16 RX ADMIN — ATORVASTATIN CALCIUM 80 MG: 80 TABLET, FILM COATED ORAL at 13:10

## 2025-01-16 RX ADMIN — CALCIUM GLUCONATE 1 G: 20 INJECTION, SOLUTION INTRAVENOUS at 10:34

## 2025-01-16 RX ADMIN — CALCIUM GLUCONATE 1 G: 20 INJECTION, SOLUTION INTRAVENOUS at 11:29

## 2025-01-16 RX ADMIN — SODIUM ZIRCONIUM CYCLOSILICATE 10 G: 10 POWDER, FOR SUSPENSION ORAL at 12:30

## 2025-01-16 NOTE — ASSESSMENT & PLAN NOTE
Patient reports diarrhea since Sunday,  no sick contacts he is aware of, no dietary indiscretions, no eating out.   No recent abx usage  Stool enteric panel ordered, c diff; follow up on results

## 2025-01-16 NOTE — ASSESSMENT & PLAN NOTE
Lab Results   Component Value Date    EGFR 4 01/16/2025    EGFR 6 12/21/2024    EGFR 8 12/20/2024    CREATININE 11.10 (H) 01/16/2025    CREATININE 7.74 (H) 12/21/2024    CREATININE 6.29 (H) 12/20/2024   Patient is on dialysis T-Th-Sat  Missed Tuesday dialysis, due for dialysis today  Nephrology consulted; plan for dialysis this afternoon   BMP in am

## 2025-01-16 NOTE — PLAN OF CARE
Post-Dialysis RN Treatment Note    Blood Pressure:  Pre 173/73 mm/Hg  Post 184/81 mmHg   EDW:  99.0 kg    Weight:  Pre 106.7 kg   Post 102.2 kg   Mode of weight measurement: Bed Scale   Volume Removed:  4500 ml    Treatment duration: 210 minutes    NS given:  No    Treatment shortened No   Medications given during Rx: Not Applicable   Estimated Kt/V:  Not Applicable   Access type: Permacath/TDC   Needle Gauge:    Access Issues: No    Report called to primary nurse:   Yes / Munira Garza RN          5000 ml as tolerated/ dr. Bazan verbal order, 2K/1K bath/ verbal dr. Bazan, 3.5 hrs  Problem: METABOLIC, FLUID AND ELECTROLYTES - ADULT  Goal: Electrolytes maintained within normal limits  Description: INTERVENTIONS:  - Monitor labs and assess patient for signs and symptoms of electrolyte imbalances  - Administer electrolyte replacement as ordered  - Monitor response to electrolyte replacements, including repeat lab results as appropriate  - Instruct patient on fluid and nutrition as appropriate  Outcome: Progressing  Goal: Fluid balance maintained  Description: INTERVENTIONS:  - Monitor labs   - Monitor I/O and WT  - Instruct patient on fluid and nutrition as appropriate  - Assess for signs & symptoms of volume excess or deficit  Outcome: Progressing

## 2025-01-16 NOTE — ASSESSMENT & PLAN NOTE
Patient noted to have bradycardia in 30s present on admission.  BP stable  Patient is not on any marek blocking agents.  Reports feeling tired no chest pain.  Troponins negative at 0-hour and 2 hour   Telemetry ordered.  May consider cardiology consultation

## 2025-01-16 NOTE — CONSULTS
NEPHROLOGY HOSPITAL CONSULTATION   Naresh Carpio 64 y.o. male MRN: [id number]  Unit/Bed#: ED 04 Encounter: [id number]    Brief History of Admission -64-year-old male with a history of ESRD who presents for flulike symptoms missed dialysis on Tuesday.  Nephrology counts for hyperkalemia.    Urgent consultation requested to assist with urgent dialysis.    Assessment & Plan  Hyperkalemia  -- Potassium 7.3  --Missed dialysis on Tuesday  --Received 2 doses of calcium gluconate, 5 units regular insulin with dextrose, 500 mL of NS and 10 g of Lokelma  --Plan for urgent dialysis today once he gets a room  --HD orders placed by me  --Low potassium diet  --Concurrently bradycardic, telemetry monitoring for the next 24 hours  ESRD (end stage renal disease) on dialysis ([facility])  Lab Results   Component Value Date    EGFR 4 01/16/2025    EGFR 6 12/21/2024    EGFR 8 12/20/2024    CREATININE 11.10 (H) 01/16/2025    CREATININE 7.74 (H) 12/21/2024    CREATININE 6.29 (H) 12/20/2024   -- In [facility] hemodialysis at [facility] Tuesday Thursday Saturday  --Last dialysis was on Saturday, January 11, missed dialysis on Tuesday, January 14  --Access: Right IJ permacath  --Dry weight 99 kg  --Patient presents with hyperkalemic  --Plan for dialysis today, as soon as he gets a room  --Discussed with the primary team and the HD nurse  Primary hypertension  -- Blood pressure is high, saturating well on room air  --Likely volume mediated  --Monitor on hemodialysis  Anemia in ESRD (end-stage renal disease)  ([facility])  Lab Results   Component Value Date    EGFR 4 01/16/2025    EGFR 6 12/21/2024    EGFR 8 12/20/2024    CREATININE 11.10 (H) 01/16/2025    CREATININE 7.74 (H) 12/21/2024    CREATININE 6.29 (H) 12/20/2024     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, on chronic dialysis ([facility])  Lab Results   Component Value Date    EGFR 4 01/16/2025    EGFR 6 12/21/2024    EGFR 8 12/20/2024     CREATININE 11.10 (H) 01/16/2025    CREATININE 7.74 (H) 12/21/2024    CREATININE 6.29 (H) 12/20/2024   -- Restart home medications    I have reviewed the nephrology recommendations including assessment and plan, with patient primary team HD nurse and ER team, and we are in agreement with renal plan including the information outlined above.    HISTORY OF PRESENT ILLNESS:  Requesting Physician: Freddy Carrasquillo DO  Reason for Consult: Hyperkalemia and end-stage renal disease    Naresh Carpio is a 64 y.o. male who was admitted to [facility] after presenting with flulike symptoms. A renal consultation is requested today for assistance in the management of ESRD.  Patient seen in the emergency room as an urgent consultation for hyperkalemia.  Potassium noted to be 7.3 and he is bradycardic.  Patient gets dialysis Tuesday Thursday Saturday but missed on Tuesday due to feeling unwell.  No documented fevers in the hospital.    PAST MEDICAL HISTORY:  Past Medical History:   Diagnosis Date    Acute cystitis 10/18/2024    Acute on chronic anemia 01/23/2022    Atrial fibrillation (HCC)     Diabetes mellitus (HCC)     ESRD (end stage renal disease) on dialysis (HCC)     Hematuria 10/10/2024    Hematuria 10/10/2024    Hyperkalemia 04/06/2024    Hyperlipidemia     Hypertension     Left toe amputee (HCC)     TIA (transient ischemic attack)     Toxic metabolic encephalopathy 10/08/2024       PAST SURGICAL HISTORY:  Past Surgical History:   Procedure Laterality Date    AMPUTATION Left     left foot resection 10 years ago dr tran    IR LOWER EXTREMITY ANGIOGRAM  08/29/2023    IR TEMPORARY DIALYSIS CATHETER PLACEMENT  08/25/2023    IR TUNNELED CENTRAL LINE REMOVAL  08/23/2023    IR TUNNELED DIALYSIS CATHETER PLACEMENT  01/27/2022    IR TUNNELED DIALYSIS CATHETER PLACEMENT  08/30/2023    ND AMPUTATION METATARSAL W/TOE SINGLE Right 8/31/2023    Procedure: RIGHT PARTIAL 1ST RAY RESECTION WITH  WOUND VAC APPLICATION;   Surgeon: Won Parmar DPM;  Location: [facility];  Service: Podiatry       ALLERGIES:  No Known Allergies    SOCIAL HISTORY:  Social History     Substance and Sexual Activity   Alcohol Use Not Currently    Alcohol/week: 0.0 standard drinks of alcohol    Comment: 0     Social History     Substance and Sexual Activity   Drug Use Never     Social History     Tobacco Use   Smoking Status Never    Passive exposure: Never   Smokeless Tobacco Never       FAMILY HISTORY:  History reviewed. No pertinent family history.    MEDICATIONS:    Current Facility-Administered Medications:     acetaminophen (TYLENOL) tablet 650 mg, 650 mg, Oral, Once, Yoel Krueger MD    ondansetron (ZOFRAN) injection 4 mg, 4 mg, Intravenous, Once, Yoel Krueger MD    Sodium Zirconium Cyclosilicate (Lokelma) 10 g, 10 g, Oral, Daily, Yoel Krueger MD, 10 g at 01/16/25 1230    Current Outpatient Medications:     allopurinol (ZYLOPRIM) 100 mg tablet, Take 1 tablet (100 mg total) by mouth daily, Disp: 30 tablet, Rfl: 0    apixaban (Eliquis) 5 mg, Take 1 tablet (5 mg total) by mouth 2 (two) times a day, Disp: 60 tablet, Rfl: 0    aspirin 81 mg chewable tablet, Chew 1 tablet (81 mg total) daily, Disp: 30 tablet, Rfl: 0    atorvastatin (LIPITOR) 80 mg tablet, Take 80 mg by mouth daily, Disp: , Rfl:     gabapentin (NEURONTIN) 100 mg capsule, Take 2 capsules (200 mg total) by mouth 2 (two) times a day, Disp: 120 capsule, Rfl: 0    NIFEdipine (PROCARDIA XL) 30 mg 24 hr tablet, Take 1 tablet (30 mg total) by mouth daily after dinner, Disp: , Rfl:     pantoprazole (PROTONIX) 40 mg tablet, Take 1 tablet (40 mg total) by mouth daily in the early morning, Disp: 30 tablet, Rfl: 0    polyethylene glycol (MIRALAX) 17 g packet, Take 17 g by mouth daily Do not start before September 7, 2023., Disp: , Rfl: 0    sevelamer (RENAGEL) 800 mg tablet, Take 1 tablet (800 mg total) by mouth 3 (three) times a day with meals, Disp: , Rfl: 0    REVIEW OF  SYSTEMS:  Constitutional: Negative for fatigue, anorexia, fever, chills, diaphoresis  HENT: Negative for postnasal drip  Eyes: Negative for visual disturbance.   Respiratory: Negative for cough, shortness of breath and wheezing.   Cardiovascular: Negative for chest pain, palpitations and leg swelling.   Gastrointestinal: Negative for abdominal pain, constipation, diarrhea, nausea and vomiting.   Genitourinary: No dysuria, hematuria  Endocrine: Negative for polyuria.   Musculoskeletal: Negative for arthralgias, back pain and joint swelling.   Skin: Negative for rash.   Neurological: Negative for focal weakness, headaches, dizziness.  Hematological: Negative for easy bruising or bleeding.  Psychiatric/Behavioral: Negative for confusion and sleep disturbance.   All the systems were reviewed and were negative except as documented on the HPI.    PHYSICAL EXAM:  Current Weight:    First Weight:    Vitals:    01/16/25 0951 01/16/25 1000 01/16/25 1100 01/16/25 1200   BP: (!) 188/79 (!) 175/78 (!) 206/85 (!) 172/74   BP Location:  Left arm Left arm Left arm   Pulse: (!) 44 (!) 43 (!) 42 (!) 37   Resp: 14 18 (!) 31 21   Temp:       TempSrc:       SpO2: 96% 95% 94%        Intake/Output Summary (Last 24 hours) at 1/16/2025 1233  Last data filed at 1/16/2025 1041  Gross per 24 hour   Intake 500 ml   Output --   Net 500 ml     Physical Exam  Vitals and nursing note reviewed.   Constitutional:       General: He is not in acute distress.     Appearance: He is well-developed. He is not diaphoretic.   HENT:      Head: Normocephalic and atraumatic.   Eyes:      General: No scleral icterus.     Pupils: Pupils are equal, round, and reactive to light.   Cardiovascular:      Rate and Rhythm: Normal rate and regular rhythm.      Heart sounds: Normal heart sounds. No murmur heard.     No friction rub. No gallop.   Pulmonary:      Effort: Pulmonary effort is normal. No respiratory distress.      Breath sounds: Normal breath sounds. No  "wheezing or rales.   Chest:      Chest wall: No tenderness.   Abdominal:      General: Bowel sounds are normal. There is no distension.      Palpations: Abdomen is soft.      Tenderness: There is no abdominal tenderness. There is no rebound.   Musculoskeletal:         General: Normal range of motion.      Cervical back: Normal range of motion and neck supple.   Skin:     Findings: No rash.   Neurological:      Mental Status: He is alert and oriented to person, place, and time.           Invasive Devices:      Lab Results:   Results from last 7 days   Lab Units 01/16/25  1008   WBC Thousand/uL 6.74   HEMOGLOBIN g/dL 10.8*   HEMATOCRIT % 35.7*   PLATELETS Thousands/uL 201   POTASSIUM mmol/L 7.3*   CHLORIDE mmol/L 101   CO2 mmol/L 18*   BUN mg/dL 106*   CREATININE mg/dL 11.10*   CALCIUM mg/dL 7.6*   MAGNESIUM mg/dL 2.7   PHOSPHORUS mg/dL 9.1*   ALK PHOS U/L 147*   ALT U/L 94*   AST U/L 31     Other Studies:     Portions of the record may have been created with voice recognition software. Occasional wrong word or \"sound a like\" substitutions may have occurred due to the inherent limitations of voice recognition software. Read the chart carefully and recognize, using context, where substitutions have occurred.If you have any questions, please contact the dictating provider.    "

## 2025-01-16 NOTE — ASSESSMENT & PLAN NOTE
-- Potassium 7.3  --Missed dialysis on Tuesday  --Received 2 doses of calcium gluconate, 5 units regular insulin with dextrose, 500 mL of NS and 10 g of Lokelma  --Plan for urgent dialysis today once he gets a room  --HD orders placed by me  --Low potassium diet  --Concurrently bradycardic, telemetry monitoring for the next 24 hours

## 2025-01-16 NOTE — ED PROVIDER NOTES
Time reflects when diagnosis was documented in both MDM as applicable and the Disposition within this note       Time User Action Codes Description Comment    1/16/2025 11:27 AM Yokubaitis, Yoel Add [R53.1] Generalized weakness     1/16/2025 11:27 AM Yokubaitis, Yoel Add [R68.89] Flu-like symptoms     1/16/2025 11:27 AM Yokubaitis, Yoel Add [E87.5] Hyperkalemia     1/16/2025 11:27 AM Yokubaitis, Yoel Add [N18.6] ESRD (end stage renal disease) ([facility])     1/16/2025 11:27 AM Yokubaitis, Yoel Add [R00.1] Bradycardia           ED Disposition       ED Disposition   Admit    Condition   Stable    Date/Time   Thu Jan 16, 2025 11:27 AM    Comment   Case was discussed with mable and the patient's admission status was agreed to be Admission Status: observation status to the service of mable .               Assessment & Plan       Medical Decision Making  64-year-old male present emergency department due to likely viral syndrome.  Labs obtained due to patient missing dialysis and showing multiple electrolyte derangements.  Discussed case with nephrology who recommends admission for dialysis this evening.  In the meantime, will give medications to attempt to lower potassium.  Patient is significantly bradycardic, but per chart review is chronically this way.  Will continue to monitor this pending dialysis as could have exacerbation from electrolyte abnormalities.    Amount and/or Complexity of Data Reviewed  Labs: ordered. Decision-making details documented in ED Course.    Risk  OTC drugs.  Prescription drug management.  Decision regarding hospitalization.        ED Course as of 01/19/25 0350   Thu Jan 16, 2025   1057 hs TnI 0hr: 27   1058 BNP(!): 1,640   1107 Potassium(!!): 7.3       Medications   sodium chloride 0.9 % bolus 500 mL (0 mL Intravenous Stopped 1/16/25 1041)   calcium gluconate 1 g in sodium chloride 0.9% 50 mL (premix) (0 g Intravenous Stopped 1/16/25 1105)   calcium gluconate 1 g in sodium chloride 0.9% 50 mL  (premix) (0 g Intravenous Stopped 1/16/25 1204)   insulin regular (HumuLIN R,NovoLIN R) injection 5 Units (5 Units Subcutaneous Given 1/16/25 1128)   dextrose 50 % IV solution 25 mL (25 mL Intravenous Given 1/16/25 1121)       ED Risk Strat Scores                          SBIRT 20yo+      Flowsheet Row Most Recent Value   Initial Alcohol Screen: US AUDIT-C     1. How often do you have a drink containing alcohol? 0 Filed at: 01/16/2025 0946   2. How many drinks containing alcohol do you have on a typical day you are drinking?  0 Filed at: 01/16/2025 0946   3a. Male UNDER 65: How often do you have five or more drinks on one occasion? 0 Filed at: 01/16/2025 0946   3b. FEMALE Any Age, or MALE 65+: How often do you have 4 or more drinks on one occassion? 0 Filed at: 01/16/2025 0946   Audit-C Score 0 Filed at: 01/16/2025 0946   NICOLÁS: How many times in the past year have you...    Used an illegal drug or used a prescription medication for non-medical reasons? Never Filed at: 01/16/2025 0946                            History of Present Illness       Chief Complaint   Patient presents with    Weakness - Generalized     Patient arrived EMS from home. Patient reports weakness, nausea, labored breathing and diarrhea since Monday. Patient reports dialysis T/TH/S. Patient reports missing Tuesday & Thursday dialysis.        Past Medical History:   Diagnosis Date    Acute cystitis 10/18/2024    Acute on chronic anemia 01/23/2022    Atrial fibrillation (HCC)     Bradycardia 09/05/2023    Diabetes mellitus (HCC)     ESRD (end stage renal disease) on dialysis (HCC)     Hematuria 10/10/2024    Hematuria 10/10/2024    Hyperkalemia 04/06/2024    Hyperkalemia 04/06/2024    Hyperlipidemia     Hypertension     Left toe amputee (HCC)     TIA (transient ischemic attack)     Toxic metabolic encephalopathy 10/08/2024      Past Surgical History:   Procedure Laterality Date    AMPUTATION Left     left foot resection 10 years ago dr tran     HEMODIALYSIS ADULT  1/18/2025    IR LOWER EXTREMITY ANGIOGRAM  08/29/2023    IR TEMPORARY DIALYSIS CATHETER PLACEMENT  08/25/2023    IR TUNNELED CENTRAL LINE REMOVAL  08/23/2023    IR TUNNELED DIALYSIS CATHETER PLACEMENT  01/27/2022    IR TUNNELED DIALYSIS CATHETER PLACEMENT  08/30/2023    CT AMPUTATION METATARSAL W/TOE SINGLE Right 8/31/2023    Procedure: RIGHT PARTIAL 1ST RAY RESECTION WITH  WOUND VAC APPLICATION;  Surgeon: Won Parmar DPM;  Location: [facility];  Service: Podiatry      History reviewed. No pertinent family history.   Social History     Tobacco Use    Smoking status: Never     Passive exposure: Never    Smokeless tobacco: Never   Vaping Use    Vaping status: Never Used   Substance Use Topics    Alcohol use: Not Currently     Alcohol/week: 0.0 standard drinks of alcohol     Comment: 0    Drug use: Never      E-Cigarette/Vaping    E-Cigarette Use Never User       E-Cigarette/Vaping Substances    Nicotine No     THC No     CBD No     Flavoring No     Other No     Unknown No       I have reviewed and agree with the history as documented.     64M presenting with body aches, nausea, weakness since Monday. Did not feel well enough to go to dialysis Tue/Today (T, Th, Sat). Denies headache, chest pain, shortness of breath, or other associated complaints.  Notes that he feels like he has a viral syndrome, but it has been lasting longer than he expected.         Review of Systems   All other systems reviewed and are negative.          Objective       ED Triage Vitals   Temperature Pulse Blood Pressure Respirations SpO2 Patient Position - Orthostatic VS   01/16/25 0945 01/16/25 0941 01/16/25 0941 01/16/25 0941 01/16/25 0941 01/16/25 0941   97.8 °F (36.6 °C) (!) 46 113/62 20 93 % Sitting      Temp Source Heart Rate Source BP Location FiO2 (%) Pain Score    01/16/25 0945 01/16/25 0941 01/16/25 0941 -- 01/16/25 0941    Oral Monitor Left arm  No Pain      Vitals      Date and Time Temp Pulse SpO2 Resp BP Pain  Score FACES Pain Rating User   01/18/25 1100 97.6 °F (36.4 °C) 69 -- 16 164/63 -- -- DM   01/18/25 1030 -- 65 -- 16 165/66 -- -- DM   01/18/25 1000 -- 67 -- 16 166/67 -- -- DM   01/18/25 0930 -- 69 -- 16 157/64 -- -- DM   01/18/25 0900 -- 67 -- 16 155/66 -- -- DM   01/18/25 0830 -- 72 -- 16 153/65 -- -- DM   01/18/25 0800 -- -- -- -- -- No Pain -- GL   01/18/25 0800 -- 63 -- 16 153/64 -- -- DM   01/18/25 0730 -- 64 -- 16 153/64 -- -- DM   01/18/25 0725 97.5 °F (36.4 °C) 66 -- 16 144/63 -- -- DM   01/18/25 0100 -- -- 92 % -- -- -- -- TA   01/17/25 2304 97.5 °F (36.4 °C) 60 95 % 16 165/69 -- -- DII   01/17/25 2033 -- -- -- -- -- No Pain -- TA   01/17/25 2033 97.6 °F (36.4 °C) 56 95 % 18 164/68 -- -- DII   01/17/25 1548 98.2 °F (36.8 °C) 58 92 % 18 145/56 -- -- DII   01/17/25 0734 97.9 °F (36.6 °C) -- -- 18 -- No Pain -- WL   01/17/25 0734 -- 60 93 % -- 127/52 -- -- DII   01/17/25 0025 -- 64 95 % 19 163/64 -- -- DII   01/16/25 2100 -- -- -- -- -- No Pain -- DW   01/16/25 1853 96 °F (35.6 °C) 63 98 % 18 179/78 -- -- DII   01/16/25 1800 -- -- -- -- -- No Pain -- CN   01/16/25 1730 97 °F (36.1 °C) -- -- 16 184/81 -- --    01/16/25 1700 -- 60 98 % 18 183/71 -- -- CN   01/16/25 1630 -- 55 97 % 16 170/74 -- --    01/16/25 1600 -- 55 98 % 18 170/68 -- --    01/16/25 1530 -- 56 97 % 16 177/84 -- --    01/16/25 1500 -- 65 91 % 18 179/88 -- --    01/16/25 1430 -- 39 100 % 16 166/77 -- --    01/16/25 1400 -- 35 -- 18 158/65 -- --    01/16/25 1355 97.1 °F (36.2 °C) 34 -- 16 173/73 -- --    01/16/25 1355 -- -- 94 % -- -- No Pain --    01/16/25 1200 -- 37 -- 21 172/74 No Pain -- MD   01/16/25 1100 -- 42 94 % 31 206/85 No Pain -- MD   01/16/25 1000 -- 43 95 % 18 175/78 -- -- MD   01/16/25 0951 -- 44 96 % 14 188/79 -- -- MD   01/16/25 0945 97.8 °F (36.6 °C) -- -- -- -- -- -- MD   01/16/25 0941 -- 46 93 % 20 113/62 No Pain -- CG            Physical Exam  Vitals and nursing note reviewed.   Constitutional:        General: He is not in acute distress.     Appearance: He is well-developed.   HENT:      Head: Normocephalic and atraumatic.      Nose: Congestion present.   Eyes:      Conjunctiva/sclera: Conjunctivae normal.   Cardiovascular:      Rate and Rhythm: Normal rate and regular rhythm.      Heart sounds: No murmur heard.  Pulmonary:      Effort: Pulmonary effort is normal. No respiratory distress.      Breath sounds: Rhonchi present.   Abdominal:      Palpations: Abdomen is soft.      Tenderness: There is no abdominal tenderness.   Musculoskeletal:         General: No swelling.      Cervical back: Neck supple.   Skin:     General: Skin is warm and dry.      Capillary Refill: Capillary refill takes less than 2 seconds.   Neurological:      Mental Status: He is alert.   Psychiatric:         Mood and Affect: Mood normal.         Results Reviewed       Procedure Component Value Units Date/Time    Basic metabolic panel [[id number]]  (Abnormal) Collected: 01/17/25 0506    Lab Status: Final result Specimen: Blood from Arm, Right Updated: 01/17/25 0704     Sodium 135 mmol/L      Potassium 4.7 mmol/L      Chloride 96 mmol/L      CO2 24 mmol/L      ANION GAP 15 mmol/L      BUN 63 mg/dL      Creatinine 8.06 mg/dL      Glucose 85 mg/dL      Glucose, Fasting 85 mg/dL      Calcium 7.3 mg/dL      eGFR 6 ml/min/1.73sq m     Narrative:      National Kidney Disease Foundation guidelines for Chronic Kidney Disease (CKD):     Stage 1 with normal or high GFR (GFR > 90 mL/min/1.73 square meters)    Stage 2 Mild CKD (GFR = 60-89 mL/min/1.73 square meters)    Stage 3A Moderate CKD (GFR = 45-59 mL/min/1.73 square meters)    Stage 3B Moderate CKD (GFR = 30-44 mL/min/1.73 square meters)    Stage 4 Severe CKD (GFR = 15-29 mL/min/1.73 square meters)    Stage 5 End Stage CKD (GFR <15 mL/min/1.73 square meters)  Note: GFR calculation is accurate only with a steady state creatinine    Magnesium [[id number]]  (Normal) Collected: 01/17/25 0506    Lab  Status: Final result Specimen: Blood from Arm, Right Updated: 01/17/25 0704     Magnesium 2.2 mg/dL     Phosphorus [[id number]]  (Abnormal) Collected: 01/17/25 0506    Lab Status: Final result Specimen: Blood from Arm, Right Updated: 01/17/25 0704     Phosphorus 6.9 mg/dL     CBC (With Platelets) [[id number]]  (Abnormal) Collected: 01/17/25 0506    Lab Status: Final result Specimen: Blood from Arm, Right Updated: 01/17/25 0618     WBC 6.39 Thousand/uL      RBC 3.45 Million/uL      Hemoglobin 10.5 g/dL      Hematocrit 33.1 %      MCV 96 fL      MCH 30.4 pg      MCHC 31.7 g/dL      RDW 16.7 %      Platelets 195 Thousands/uL      MPV 10.7 fL     HS Troponin I 2hr [[id number]]  (Normal) Collected: 01/16/25 1222    Lab Status: Final result Specimen: Blood from Arm, Left Updated: 01/16/25 1258     hs TnI 2hr 26 ng/L      Delta 2hr hsTnI -1 ng/L     Stool Enteric Bacterial Panel by PCR [[id number]]     Lab Status: No result Specimen: Stool     TSH, 3rd generation with Free T4 reflex [[id number]]  (Normal) Collected: 01/16/25 1008    Lab Status: Final result Specimen: Blood from Arm, Left Updated: 01/16/25 1107     TSH 3RD GENERATON 3.010 uIU/mL     Comprehensive metabolic panel [[id number]]  (Abnormal) Collected: 01/16/25 1008    Lab Status: Final result Specimen: Blood from Arm, Left Updated: 01/16/25 1107     Sodium 136 mmol/L      Potassium 7.3 mmol/L      Chloride 101 mmol/L      CO2 18 mmol/L      ANION GAP 17 mmol/L       mg/dL      Creatinine 11.10 mg/dL      Glucose 110 mg/dL      Calcium 7.6 mg/dL      AST 31 U/L      ALT 94 U/L      Alkaline Phosphatase 147 U/L      Total Protein 7.5 g/dL      Albumin 4.2 g/dL      Total Bilirubin 0.43 mg/dL      eGFR 4 ml/min/1.73sq m     Narrative:      National Kidney Disease Foundation guidelines for Chronic Kidney Disease (CKD):     Stage 1 with normal or high GFR (GFR > 90 mL/min/1.73 square meters)    Stage 2 Mild CKD (GFR = 60-89 mL/min/1.73 square meters)    Stage 3A  Moderate CKD (GFR = 45-59 mL/min/1.73 square meters)    Stage 3B Moderate CKD (GFR = 30-44 mL/min/1.73 square meters)    Stage 4 Severe CKD (GFR = 15-29 mL/min/1.73 square meters)    Stage 5 End Stage CKD (GFR <15 mL/min/1.73 square meters)  Note: GFR calculation is accurate only with a steady state creatinine    Magnesium [[id number]]  (Normal) Collected: 01/16/25 1008    Lab Status: Final result Specimen: Blood from Arm, Left Updated: 01/16/25 1104     Magnesium 2.7 mg/dL     Phosphorus [[id number]]  (Abnormal) Collected: 01/16/25 1008    Lab Status: Final result Specimen: Blood from Arm, Left Updated: 01/16/25 1104     Phosphorus 9.1 mg/dL     Lipase [[id number]]  (Normal) Collected: 01/16/25 1008    Lab Status: Final result Specimen: Blood from Arm, Left Updated: 01/16/25 1104     Lipase 28 u/L     B-Type Natriuretic Peptide(BNP) [[id number]]  (Abnormal) Collected: 01/16/25 1008    Lab Status: Final result Specimen: Blood from Arm, Left Updated: 01/16/25 1058     BNP 1,640 pg/mL     HS Troponin 0hr (reflex protocol) [[id number]]  (Normal) Collected: 01/16/25 1008    Lab Status: Final result Specimen: Blood from Arm, Left Updated: 01/16/25 1056     hs TnI 0hr 27 ng/L     FLU/COVID Rapid Antigen (30 min. TAT) - Preferred screening test in ED [[id number]]  (Normal) Collected: 01/16/25 1008    Lab Status: Final result Specimen: Nares from Nose Updated: 01/16/25 1046     SARS COV Rapid Antigen Negative     Influenza A Rapid Antigen Negative     Influenza B Rapid Antigen Negative    Narrative:      This test has been performed using the SVXRidel Gracie 2 FLU+SARS Antigen test under the Emergency Use Authorization (EUA). This test has been validated by the  and verified by the performing laboratory. The Gracie uses lateral flow immunofluorescent sandwich assay to detect SARS-COV, Influenza A and Influenza B Antigen.     The Quidel Gracie 2 SARS Antigen test does not differentiate between SARS-CoV and SARS-CoV-2.      Negative results are presumptive and may be confirmed with a molecular assay, if necessary, for patient management. Negative results do not rule out SARS-CoV-2 or influenza infection and should not be used as the sole basis for treatment or patient management decisions. A negative test result may occur if the level of antigen in a sample is below the limit of detection of this test.     Positive results are indicative of the presence of viral antigens, but do not rule out bacterial infection or co-infection with other viruses.     All test results should be used as an adjunct to clinical observations and other information available to the provider.    FOR PEDIATRIC PATIENTS - copy/paste COVID Guidelines URL to browser: [email or web address]    CBC and differential [[id number]]  (Abnormal) Collected: 01/16/25 1008    Lab Status: Final result Specimen: Blood from Arm, Left Updated: 01/16/25 1021     WBC 6.74 Thousand/uL      RBC 3.57 Million/uL      Hemoglobin 10.8 g/dL      Hematocrit 35.7 %       fL      MCH 30.3 pg      MCHC 30.3 g/dL      RDW 17.1 %      MPV 10.8 fL      Platelets 201 Thousands/uL      nRBC 0 /100 WBCs      Segmented % 66 %      Immature Grans % 0 %      Lymphocytes % 18 %      Monocytes % 10 %      Eosinophils Relative 5 %      Basophils Relative 1 %      Absolute Neutrophils 4.51 Thousands/µL      Absolute Immature Grans 0.03 Thousand/uL      Absolute Lymphocytes 1.20 Thousands/µL      Absolute Monocytes 0.64 Thousand/µL      Eosinophils Absolute 0.31 Thousand/µL      Basophils Absolute 0.05 Thousands/µL             No orders to display       Procedures    ED Medication and Procedure Management   Prior to Admission Medications   Prescriptions Last Dose Informant Patient Reported? Taking?   NIFEdipine (PROCARDIA XL) 30 mg 24 hr tablet Unknown  No No   Sig: Take 1 tablet (30 mg total) by mouth daily after dinner   allopurinol (ZYLOPRIM)  100 mg tablet Unknown Self No No   Sig: Take 1 tablet (100 mg total) by mouth daily   apixaban (Eliquis) 5 mg Unknown  No No   Sig: Take 1 tablet (5 mg total) by mouth 2 (two) times a day   aspirin 81 mg chewable tablet Unknown  No No   Sig: Chew 1 tablet (81 mg total) daily   atorvastatin (LIPITOR) 80 mg tablet Unknown  Yes No   Sig: Take 80 mg by mouth daily   gabapentin (NEURONTIN) 100 mg capsule   No No   Sig: Take 2 capsules (200 mg total) by mouth 2 (two) times a day   pantoprazole (PROTONIX) 40 mg tablet Unknown  No No   Sig: Take 1 tablet (40 mg total) by mouth daily in the early morning   polyethylene glycol (MIRALAX) 17 g packet Unknown Self No No   Sig: Take 17 g by mouth daily Do not start before September 7, 2023.   sevelamer (RENAGEL) 800 mg tablet Unknown Self No No   Sig: Take 1 tablet (800 mg total) by mouth 3 (three) times a day with meals      Facility-Administered Medications: None     Discharge Medication List as of 1/18/2025 11:50 AM        CONTINUE these medications which have NOT CHANGED    Details   allopurinol (ZYLOPRIM) 100 mg tablet Take 1 tablet (100 mg total) by mouth daily, Starting Fri 3/22/2024, Normal      apixaban (Eliquis) 5 mg Take 1 tablet (5 mg total) by mouth 2 (two) times a day, Starting Mon 12/2/2024, Normal      aspirin 81 mg chewable tablet Chew 1 tablet (81 mg total) daily, Starting Wed 10/23/2024, Normal      atorvastatin (LIPITOR) 80 mg tablet Take 80 mg by mouth daily, Starting Tue 11/2/2021, Historical Med      gabapentin (NEURONTIN) 100 mg capsule Take 2 capsules (200 mg total) by mouth 2 (two) times a day, Starting Fri 3/22/2024, Until Tue 11/19/2024, Normal      NIFEdipine (PROCARDIA XL) 30 mg 24 hr tablet Take 1 tablet (30 mg total) by mouth daily after dinner, Starting Mon 12/2/2024, No Print      pantoprazole (PROTONIX) 40 mg tablet Take 1 tablet (40 mg total) by mouth daily in the early morning, Starting Wed 10/23/2024, Normal      polyethylene glycol (MIRALAX)  17 g packet Take 17 g by mouth daily Do not start before September 7, 2023., Starting Thu 9/7/2023, No Print      sevelamer (RENAGEL) 800 mg tablet Take 1 tablet (800 mg total) by mouth 3 (three) times a day with meals, Starting Wed 2/2/2022, No Print           No discharge procedures on file.  ED SEPSIS DOCUMENTATION   Time reflects when diagnosis was documented in both MDM as applicable and the Disposition within this note       Time User Action Codes Description Comment    1/16/2025 11:27 AM Yoel Krueger Add [R53.1] Generalized weakness     1/16/2025 11:27 AM Yoel Krueger [R68.89] Flu-like symptoms     1/16/2025 11:27 AM Yoel Krueger [E87.5] Hyperkalemia     1/16/2025 11:27 AM Yoel Krueger [N18.6] ESRD (end stage renal disease) ([facility])     1/16/2025 11:27 AM Yoel Krueger [R00.1] Chelsy Krueger MD  01/19/25 0350

## 2025-01-16 NOTE — ASSESSMENT & PLAN NOTE
Hyperkalemia present on admission as evidenced by potassium 7.3, suspect in part secondary to patient missing dialysis on Tuesday and unable to get to his dialysis appointment on day of admission secondary to feeling poorly.  Patient received half amp of D50 with insulin 5 units and also [facility]  Nephrology consulted, plan for dialysis this afternoon  Repeat BMP this evening

## 2025-01-16 NOTE — ASSESSMENT & PLAN NOTE
Lab Results   Component Value Date    EGFR 4 01/16/2025    EGFR 6 12/21/2024    EGFR 8 12/20/2024    CREATININE 11.10 (H) 01/16/2025    CREATININE 7.74 (H) 12/21/2024    CREATININE 6.29 (H) 12/20/2024   -- In center hemodialysis at [facility] Tuesday Thursday Saturday  --Last dialysis was on Saturday, January 11, missed dialysis on Tuesday, January 14  --Access: Right IJ permacath  --Dry weight 99 kg  --Patient presents with hyperkalemic  --Plan for dialysis today, as soon as he gets a room  --Discussed with the primary team and the HD nurse

## 2025-01-16 NOTE — ASSESSMENT & PLAN NOTE
Patient complaining of flulike symptoms starting on Sunday, reported muscle and bone aches, headache, low-grade fever and diarrhea.  Subsequently missed his dialysis on Tuesday as symptoms persisted.  Patient was going to try to go to dialysis on Thursday day of presentation however continued to feel poorly and presented to emergency department for further evaluation and treatment.  Flu/RSV/COVID swab negative  Patient denies cough, no shortness of breath.  Currently afebrile  WBC normal  Monitor fever curve and cbc  Supportive care

## 2025-01-16 NOTE — ASSESSMENT & PLAN NOTE
Lab Results   Component Value Date    EGFR 4 01/16/2025    EGFR 6 12/21/2024    EGFR 8 12/20/2024    CREATININE 11.10 (H) 01/16/2025    CREATININE 7.74 (H) 12/21/2024    CREATININE 6.29 (H) 12/20/2024   On dialysis T-Th-Sat  Missed Tuesday; plan for dialysis this afternoon   Nephrology following

## 2025-01-16 NOTE — ASSESSMENT & PLAN NOTE
Lab Results   Component Value Date    EGFR 4 01/16/2025    EGFR 6 12/21/2024    EGFR 8 12/20/2024    CREATININE 11.10 (H) 01/16/2025    CREATININE 7.74 (H) 12/21/2024    CREATININE 6.29 (H) 12/20/2024   -- Restart home medications

## 2025-01-16 NOTE — ASSESSMENT & PLAN NOTE
Lab Results   Component Value Date    EGFR 4 01/16/2025    EGFR 6 12/21/2024    EGFR 8 12/20/2024    CREATININE 11.10 (H) 01/16/2025    CREATININE 7.74 (H) 12/21/2024    CREATININE 6.29 (H) 12/20/2024

## 2025-01-16 NOTE — ASSESSMENT & PLAN NOTE
Lab Results   Component Value Date    EGFR 4 01/16/2025    EGFR 6 12/21/2024    EGFR 8 12/20/2024    CREATININE 11.10 (H) 01/16/2025    CREATININE 7.74 (H) 12/21/2024    CREATININE 6.29 (H) 12/20/2024   Hemoglobin stable 10.8

## 2025-01-16 NOTE — ASSESSMENT & PLAN NOTE
-- Blood pressure is high, saturating well on room air  --Likely volume mediated  --Monitor on hemodialysis

## 2025-01-16 NOTE — H&P
H&P - Hospitalist   Name: Naresh Carpio 64 y.o. male I MRN: [id number]  Unit/Bed#: [facility] I Date of Admission: 1/16/2025   Date of Service: 1/16/2025 I Hospital Day: 0     Assessment & Plan  Flu-like symptoms  Patient complaining of flulike symptoms starting on Sunday, reported muscle and bone aches, headache, low-grade fever and diarrhea.  Subsequently missed his dialysis on Tuesday as symptoms persisted.  Patient was going to try to go to dialysis on Thursday day of presentation however continued to feel poorly and presented to emergency department for further evaluation and treatment.  Flu/RSV/COVID swab negative  Patient denies cough, no shortness of breath.  Currently afebrile  WBC normal  Monitor fever curve and cbc  Supportive care  Bradycardia  Patient noted to have bradycardia in 30s present on admission.  BP stable  Patient is not on any marek blocking agents.  Reports feeling tired no chest pain.  Troponins negative at 0-hour and 2 hour   Telemetry ordered.  May consider cardiology consultation  Hyperkalemia  Hyperkalemia present on admission as evidenced by potassium 7.3, suspect in part secondary to patient missing dialysis on Tuesday and unable to get to his dialysis appointment on day of admission secondary to feeling poorly.  Patient received half amp of D50 with insulin 5 units and also [facility]  Nephrology consulted, plan for dialysis this afternoon  Repeat BMP this evening  ESRD (end stage renal disease) on dialysis ([facility])  Lab Results   Component Value Date    EGFR 4 01/16/2025    EGFR 6 12/21/2024    EGFR 8 12/20/2024    CREATININE 11.10 (H) 01/16/2025    CREATININE 7.74 (H) 12/21/2024    CREATININE 6.29 (H) 12/20/2024   Patient is on dialysis T-Th-Sat  Missed Tuesday dialysis, due for dialysis today  Nephrology consulted; plan for dialysis this afternoon   BMP in am   Anemia in ESRD (end-stage renal disease)  ([facility])  Lab Results   Component Value Date    EGFR 4 01/16/2025    EGFR 6 12/21/2024     EGFR 8 12/20/2024    CREATININE 11.10 (H) 01/16/2025    CREATININE 7.74 (H) 12/21/2024    CREATININE 6.29 (H) 12/20/2024   Hemoglobin stable 10.8  Hypertension  BP stable; monitor   Continue nifedipine   Primary hypertension  BP stable; on nifedipine  Monitor   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, on chronic dialysis (HCC)  Lab Results   Component Value Date    EGFR 4 01/16/2025    EGFR 6 12/21/2024    EGFR 8 12/20/2024    CREATININE 11.10 (H) 01/16/2025    CREATININE 7.74 (H) 12/21/2024    CREATININE 6.29 (H) 12/20/2024   On dialysis T-Th-Sat  Missed Tuesday; plan for dialysis this afternoon   Nephrology following   History of TIA (transient ischemic attack)  Hx of TIA noted, no residual  Continue Eliquis and Asa  Diarrhea  Patient reports diarrhea since Sunday,  no sick contacts he is aware of, no dietary indiscretions, no eating out.   No recent abx usage  Stool enteric panel ordered, c diff; follow up on results       VTE Pharmacologic Prophylaxis:   Moderate Risk (Score 3-4) - Pharmacological DVT Prophylaxis Ordered: apixaban (Eliquis).  Code Status: Level 1 - Full Code   Discussion with family: Patient declined call to .     Anticipated Length of Stay: Patient will be admitted on an observation basis with an anticipated length of stay of less than 2 midnights secondary to dialysis, stool studies, repeat labs.    History of Present Illness   Chief Complaint: flu like symptoms    Naresh Carpio is a 64 y.o. male with a PMH of acute cystitis, anemia, atrial fibrillation, diabetes, end-stage renal disease on dialysis, hematuria, hyperkalemia, HLD, HTN, left toe amputee, TIA who presents with flulike symptoms.  Patient reported that he was having generalized weakness, headache, low-grade fever, sore throat, muscle aches and joint aches since Sunday.  He missed his dialysis on Tuesday secondary to feeling poorly.  He was due for dialysis today,  continued to feel poorly and presented to the emergency department for further evaluation and treatment.  In the ED flu/RSV/COVID swab negative.  Patient wounds afebrile WBC 6.74.  Patient also endorses diarrhea which she did have a bout of in the emergency department.  Will send stools for enteric panel and C. difficile, low suspicion for C. difficile secondary to patient not being on antibiotics recently.  Nephrology is consulted and patient will receive dialysis today, creatinine noted to be 11.10 patient was noted to be hyperkalemic with potassium 7.3, did receive half amp of D50 and 5 units of regular insulin along with Lokelma.  Plan is for patient to undergo dialysis this afternoon, repeat BMP in AM.  Patient also noted to be bradycardic on the monitor with heart rate sinus bradycardia 30s and 40s, telemetry ordered.  Blood pressure stable and patient asymptomatic.  Monitor and consider cardiology consultation if persistent. Patient is to be a full code as per discussion.     Review of Systems   Constitutional:  Positive for activity change, appetite change and fever.   HENT:  Positive for congestion.    Eyes: Negative.    Respiratory:  Negative for cough and shortness of breath.    Cardiovascular:  Negative for chest pain, palpitations and leg swelling.   Gastrointestinal:  Positive for diarrhea. Negative for vomiting.   Endocrine: Negative.    Genitourinary:  Negative for decreased urine volume and difficulty urinating.   Musculoskeletal:  Positive for arthralgias and myalgias.   Skin: Negative.    Allergic/Immunologic: Negative.    Neurological:  Positive for headaches.   Hematological: Negative.    Psychiatric/Behavioral: Negative.         Historical Information   Past Medical History:   Diagnosis Date    Acute cystitis 10/18/2024    Acute on chronic anemia 01/23/2022    Atrial fibrillation (HCC)     Diabetes mellitus (HCC)     ESRD (end stage renal disease) on dialysis (HCC)     Hematuria 10/10/2024     Hematuria 10/10/2024    Hyperkalemia 04/06/2024    Hyperlipidemia     Hypertension     Left toe amputee (HCC)     TIA (transient ischemic attack)     Toxic metabolic encephalopathy 10/08/2024     Past Surgical History:   Procedure Laterality Date    AMPUTATION Left     left foot resection 10 years ago dr tran    IR LOWER EXTREMITY ANGIOGRAM  08/29/2023    IR TEMPORARY DIALYSIS CATHETER PLACEMENT  08/25/2023    IR TUNNELED CENTRAL LINE REMOVAL  08/23/2023    IR TUNNELED DIALYSIS CATHETER PLACEMENT  01/27/2022    IR TUNNELED DIALYSIS CATHETER PLACEMENT  08/30/2023    NH AMPUTATION METATARSAL W/TOE SINGLE Right 8/31/2023    Procedure: RIGHT PARTIAL 1ST RAY RESECTION WITH  WOUND VAC APPLICATION;  Surgeon: Won Parmar DPM;  Location: [facility];  Service: Podiatry     Social History     Tobacco Use    Smoking status: Never     Passive exposure: Never    Smokeless tobacco: Never   Vaping Use    Vaping status: Never Used   Substance and Sexual Activity    Alcohol use: Not Currently     Alcohol/week: 0.0 standard drinks of alcohol     Comment: 0    Drug use: Never    Sexual activity: Not on file     E-Cigarette/Vaping    E-Cigarette Use Never User      E-Cigarette/Vaping Substances    Nicotine No     THC No     CBD No     Flavoring No     Other No     Unknown No      History reviewed. No pertinent family history.  Social History:  Marital Status:    Occupation: Retired  Patient Pre-hospital Living Situation: Home  Patient Pre-hospital Level of Mobility: walks  Patient Pre-hospital Diet Restrictions: Renal diet    Meds/Allergies   I have reviewed home medications with patient personally.  Prior to Admission medications    Medication Sig Start Date End Date Taking? Authorizing Provider   allopurinol (ZYLOPRIM) 100 mg tablet Take 1 tablet (100 mg total) by mouth daily 3/22/24   Lore Silver DO   apixaban (Eliquis) 5 mg Take 1 tablet (5 mg total) by mouth 2 (two) times a day 12/2/24   Nallely Pisano PA-C    aspirin 81 mg chewable tablet Chew 1 tablet (81 mg total) daily 10/23/24   Mir Salinas MD   atorvastatin (LIPITOR) 80 mg tablet Take 80 mg by mouth daily 11/2/21   Historical Provider, MD   gabapentin (NEURONTIN) 100 mg capsule Take 2 capsules (200 mg total) by mouth 2 (two) times a day 3/22/24 11/19/24  Lore Silver DO   NIFEdipine (PROCARDIA XL) 30 mg 24 hr tablet Take 1 tablet (30 mg total) by mouth daily after dinner 12/2/24   Nallely Pisano PA-C   pantoprazole (PROTONIX) 40 mg tablet Take 1 tablet (40 mg total) by mouth daily in the early morning 10/23/24   Mir Salinas MD   polyethylene glycol (MIRALAX) 17 g packet Take 17 g by mouth daily Do not start before September 7, 2023. 9/7/23   Nallely Pisano PA-C   sevelamer (RENAGEL) 800 mg tablet Take 1 tablet (800 mg total) by mouth 3 (three) times a day with meals 2/2/22   Lore Silver DO     No Known Allergies    Objective :  Temp:  [97.8 °F (36.6 °C)] 97.8 °F (36.6 °C)  HR:  [37-46] 37  BP: (113-206)/(62-85) 172/74  Resp:  [14-31] 21  SpO2:  [93 %-96 %] 94 %  O2 Device: None (Room air)    Physical Exam  Vitals and nursing note reviewed.   Constitutional:       General: He is not in acute distress.  HENT:      Head: Normocephalic.      Nose: Nose normal.      Mouth/Throat:      Mouth: Mucous membranes are moist.   Eyes:      Extraocular Movements: Extraocular movements intact.      Conjunctiva/sclera: Conjunctivae normal.      Pupils: Pupils are equal, round, and reactive to light.   Cardiovascular:      Rate and Rhythm: Regular rhythm. Bradycardia present.      Pulses: Normal pulses.      Heart sounds: Normal heart sounds.   Pulmonary:      Effort: Pulmonary effort is normal.      Breath sounds: Normal breath sounds.   Abdominal:      General: Bowel sounds are normal. There is no distension.      Palpations: Abdomen is soft.      Tenderness: There is no abdominal tenderness.   Genitourinary:     Comments: Voiding spontaneously   Musculoskeletal:       Cervical back: Normal range of motion.      Right lower leg: Edema present.      Left lower leg: Edema present.   Skin:     General: Skin is warm and dry.      Capillary Refill: Capillary refill takes less than 2 seconds.   Neurological:      General: No focal deficit present.      Mental Status: He is alert and oriented to person, place, and time.   Psychiatric:         Mood and Affect: Mood normal.         Behavior: Behavior normal.         Thought Content: Thought content normal.         Judgment: Judgment normal.          Lines/Drains:            Lab Results: I have reviewed the following results:  Results from last 7 days   Lab Units 01/16/25  1008   WBC Thousand/uL 6.74   HEMOGLOBIN g/dL 10.8*   HEMATOCRIT % 35.7*   PLATELETS Thousands/uL 201   SEGS PCT % 66   LYMPHO PCT % 18   MONO PCT % 10   EOS PCT % 5     Results from last 7 days   Lab Units 01/16/25  1008   SODIUM mmol/L 136   POTASSIUM mmol/L 7.3*   CHLORIDE mmol/L 101   CO2 mmol/L 18*   BUN mg/dL 106*   CREATININE mg/dL 11.10*   ANION GAP mmol/L 17*   CALCIUM mg/dL 7.6*   ALBUMIN g/dL 4.2   TOTAL BILIRUBIN mg/dL 0.43   ALK PHOS U/L 147*   ALT U/L 94*   AST U/L 31   GLUCOSE RANDOM mg/dL 110             Lab Results   Component Value Date    HGBA1C 5.5 10/08/2024    HGBA1C 6.2 (H) 07/02/2024    HGBA1C 5.4 03/12/2024           Imaging Results Review: No pertinent imaging studies reviewed.  Other Study Results Review: No additional pertinent studies reviewed.    Administrative Statements   I have spent a total time of greater than 45 minutes in caring for this patient on the day of the visit/encounter including Diagnostic results, Importance of tx compliance, Counseling / Coordination of care, Documenting in the medical record, Reviewing / ordering tests, medicine, procedures  , Obtaining or reviewing history  , and Communicating with other healthcare professionals .    ** Please Note: This note has been constructed using a voice recognition system.  **

## 2025-01-16 NOTE — PLAN OF CARE
Problem: Potential for Falls  Goal: Patient will remain free of falls  Description: INTERVENTIONS:  - Educate patient/family on patient safety including physical limitations  - Instruct patient to call for assistance with activity   - Consult OT/PT to assist with strengthening/mobility   - Keep Call bell within reach  - Keep bed low and locked with side rails adjusted as appropriate  - Keep care items and personal belongings within reach  - Initiate and maintain comfort rounds  - Make Fall Risk Sign visible to staff  - Offer Toileting every 2 Hours, in advance of need  - Initiate/Maintain bed alarm  - Obtain necessary fall risk management equipment: fall risk sign on door  - Apply yellow socks and bracelet for high fall risk patients  - Consider moving patient to room near nurses station  Outcome: Progressing     Problem: METABOLIC, FLUID AND ELECTROLYTES - ADULT  Goal: Electrolytes maintained within normal limits  Description: INTERVENTIONS:  - Monitor labs and assess patient for signs and symptoms of electrolyte imbalances  - Administer electrolyte replacement as ordered  - Monitor response to electrolyte replacements, including repeat lab results as appropriate  - Instruct patient on fluid and nutrition as appropriate  Outcome: Progressing  Goal: Fluid balance maintained  Description: INTERVENTIONS:  - Monitor labs   - Monitor I/O and WT  - Instruct patient on fluid and nutrition as appropriate  - Assess for signs & symptoms of volume excess or deficit  Outcome: Progressing  Goal: Glucose maintained within target range  Description: INTERVENTIONS:  - Monitor Blood Glucose as ordered  - Assess for signs and symptoms of hyperglycemia and hypoglycemia  - Administer ordered medications to maintain glucose within target range  - Assess nutritional intake and initiate nutrition service referral as needed  Outcome: Progressing     Problem: PAIN - ADULT  Goal: Verbalizes/displays adequate comfort level or baseline  comfort level  Description: Interventions:  - Encourage patient to monitor pain and request assistance  - Assess pain using appropriate pain scale  - Administer analgesics based on type and severity of pain and evaluate response  - Implement non-pharmacological measures as appropriate and evaluate response  - Consider cultural and social influences on pain and pain management  - Notify physician/advanced practitioner if interventions unsuccessful or patient reports new pain  Outcome: Progressing     Problem: INFECTION - ADULT  Goal: Absence or prevention of progression during hospitalization  Description: INTERVENTIONS:  - Assess and monitor for signs and symptoms of infection  - Monitor lab/diagnostic results  - Monitor all insertion sites, i.e. indwelling lines, tubes, and drains  - Monitor endotracheal if appropriate and nasal secretions for changes in amount and color  - [place] appropriate cooling/warming therapies per order  - Administer medications as ordered  - Instruct and encourage patient and family to use good hand hygiene technique  - Identify and instruct in appropriate isolation precautions for identified infection/condition  Outcome: Progressing  Goal: Absence of fever/infection during neutropenic period  Description: INTERVENTIONS:  - Monitor WBC    Outcome: Progressing     Problem: SAFETY ADULT  Goal: Patient will remain free of falls  Description: INTERVENTIONS:  - Educate patient/family on patient safety including physical limitations  - Instruct patient to call for assistance with activity   - Consult OT/PT to assist with strengthening/mobility   - Keep Call bell within reach  - Keep bed low and locked with side rails adjusted as appropriate  - Keep care items and personal belongings within reach  - Initiate and maintain comfort rounds  - Make Fall Risk Sign visible to staff  - Offer Toileting every 2 Hours, in advance of need  - Initiate/Maintain bed alarm  - Obtain necessary fall risk  management equipment: fall risk sign on door  - Apply yellow socks and bracelet for high fall risk patients  - Consider moving patient to room near nurses station  Outcome: Progressing  Goal: Maintain or return to baseline ADL function  Description: INTERVENTIONS:  -  Assess patient's ability to carry out ADLs; assess patient's baseline for ADL function and identify physical deficits which impact ability to perform ADLs (bathing, care of mouth/teeth, toileting, grooming, dressing, etc.)  - Assess/evaluate cause of self-care deficits   - Assess range of motion  - Assess patient's mobility; develop plan if impaired  - Assess patient's need for assistive devices and provide as appropriate  - Encourage maximum independence but intervene and supervise when necessary  - Involve family in performance of ADLs  - Assess for home care needs following discharge   - Consider OT consult to assist with ADL evaluation and planning for discharge  - Provide patient education as appropriate  Outcome: Progressing  Goal: Maintains/Returns to pre admission functional level  Description: INTERVENTIONS:  - Perform AM-PAC 6 Click Basic Mobility/ Daily Activity assessment daily.  - Set and communicate daily mobility goal to care team and patient/family/caregiver.   - Collaborate with rehabilitation services on mobility goals if consulted  - Perform Range of Motion 2 times a day.  - Reposition patient every 2 hours.  - Dangle patient 2 times a day  - Stand patient 2 times a day  - Ambulate patient 3 times a day  - Out of bed to chair 3 times a day   - Out of bed for meals 3 times a day  - Out of bed for toileting  - Record patient progress and toleration of activity level   Outcome: Progressing     Problem: DISCHARGE PLANNING  Goal: Discharge to home or other facility with appropriate resources  Description: INTERVENTIONS:  - Identify barriers to discharge w/patient and caregiver  - Arrange for needed discharge resources and transportation  as appropriate  - Identify discharge learning needs (meds, wound care, etc.)  - Arrange for interpretive services to assist at discharge as needed  - Refer to Case Management Department for coordinating discharge planning if the patient needs post-hospital services based on physician/advanced practitioner order or complex needs related to functional status, cognitive ability, or social support system  Outcome: Progressing     Problem: Knowledge Deficit  Goal: Patient/family/caregiver demonstrates understanding of disease process, treatment plan, medications, and discharge instructions  Description: Complete learning assessment and assess knowledge base.  Interventions:  - Provide teaching at level of understanding  - Provide teaching via preferred learning methods  Outcome: Progressing     Problem: Nutrition/Hydration-ADULT  Goal: Nutrient/Hydration intake appropriate for improving, restoring or maintaining nutritional needs  Description: Monitor and assess patient's nutrition/hydration status for malnutrition. Collaborate with interdisciplinary team and initiate plan and interventions as ordered.  Monitor patient's weight and dietary intake as ordered or per policy. Utilize nutrition screening tool and intervene as necessary. Determine patient's food preferences and provide high-protein, high-caloric foods as appropriate.     INTERVENTIONS:  - Monitor oral intake, urinary output, labs, and treatment plans  - Assess nutrition and hydration status and recommend course of action  - Evaluate amount of meals eaten  - Assist patient with eating if necessary   - Allow adequate time for meals  - Recommend/ encourage appropriate diets, oral nutritional supplements, and vitamin/mineral supplements  - Order, calculate, and assess calorie counts as needed  - Recommend, monitor, and adjust tube feedings and TPN/PPN based on assessed needs  - Assess need for intravenous fluids  - Provide specific nutrition/hydration education as  appropriate  - Include patient/family/caregiver in decisions related to nutrition  Outcome: Progressing     Problem: NEUROSENSORY - ADULT  Goal: Achieves stable or improved neurological status  Description: INTERVENTIONS  - Monitor and report changes in neurological status  - Monitor vital signs such as temperature, blood pressure, glucose, and any other labs ordered   - Initiate measures to prevent increased intracranial pressure  - Monitor for seizure activity and implement precautions if appropriate      Outcome: Progressing  Goal: Achieves maximal functionality and self care  Description: INTERVENTIONS  - Monitor swallowing and airway patency with patient fatigue and changes in neurological status  - Encourage and assist patient to increase activity and self care.   - Encourage visually impaired, hearing impaired and aphasic patients to use assistive/communication devices  Outcome: Progressing     Problem: CARDIOVASCULAR - ADULT  Goal: Maintains optimal cardiac output and hemodynamic stability  Description: INTERVENTIONS:  - Monitor I/O, vital signs and rhythm  - Monitor for S/S and trends of decreased cardiac output  - Administer and titrate ordered vasoactive medications to optimize hemodynamic stability  - Assess quality of pulses, skin color and temperature  - Assess for signs of decreased coronary artery perfusion  - Instruct patient to report change in severity of symptoms  Outcome: Progressing  Goal: Absence of cardiac dysrhythmias or at baseline rhythm  Description: INTERVENTIONS:  - Continuous cardiac monitoring, vital signs, obtain 12 lead EKG if ordered  - Administer antiarrhythmic and heart rate control medications as ordered  - Monitor electrolytes and administer replacement therapy as ordered  Outcome: Progressing     Problem: RESPIRATORY - ADULT  Goal: Achieves optimal ventilation and oxygenation  Description: INTERVENTIONS:  - Assess for changes in respiratory status  - Assess for changes in  mentation and behavior  - Position to facilitate oxygenation and minimize respiratory effort  - Oxygen administered by appropriate delivery if ordered  - Initiate smoking cessation education as indicated  - Encourage broncho-pulmonary hygiene including cough, deep breathe, Incentive Spirometry  - Assess the need for suctioning and aspirate as needed  - Assess and instruct to report SOB or any respiratory difficulty  - Respiratory Therapy support as indicated  Outcome: Progressing     Problem: GASTROINTESTINAL - ADULT  Goal: Minimal or absence of nausea and/or vomiting  Description: INTERVENTIONS:  - Administer IV fluids if ordered to ensure adequate hydration  - Maintain NPO status until nausea and vomiting are resolved  - Nasogastric tube if ordered  - Administer ordered antiemetic medications as needed  - Provide nonpharmacologic comfort measures as appropriate  - Advance diet as tolerated, if ordered  - Consider nutrition services referral to assist patient with adequate nutrition and appropriate food choices  Outcome: Progressing  Goal: Maintains or returns to baseline bowel function  Description: INTERVENTIONS:  - Assess bowel function  - Encourage oral fluids to ensure adequate hydration  - Administer IV fluids if ordered to ensure adequate hydration  - Administer ordered medications as needed  - Encourage mobilization and activity  - Consider nutritional services referral to assist patient with adequate nutrition and appropriate food choices  Outcome: Progressing  Goal: Maintains adequate nutritional intake  Description: INTERVENTIONS:  - Monitor percentage of each meal consumed  - Identify factors contributing to decreased intake, treat as appropriate  - Assist with meals as needed  - Monitor I&O, weight, and lab values if indicated  - Obtain nutrition services referral as needed  Outcome: Progressing     Problem: GENITOURINARY - ADULT  Goal: Maintains or returns to baseline urinary function  Description:  INTERVENTIONS:  - Assess urinary function  - Encourage oral fluids to ensure adequate hydration if ordered  - Administer IV fluids as ordered to ensure adequate hydration  - Administer ordered medications as needed  - Offer frequent toileting  - Follow urinary retention protocol if ordered  Outcome: Progressing     Problem: HEMATOLOGIC - ADULT  Goal: Maintains hematologic stability  Description: INTERVENTIONS  - Assess for signs and symptoms of bleeding or hemorrhage  - Monitor labs  - Administer supportive blood products/factors as ordered and appropriate  Outcome: Progressing

## 2025-01-17 PROBLEM — E87.5 HYPERKALEMIA: Status: RESOLVED | Noted: 2024-04-06 | Resolved: 2025-01-17

## 2025-01-17 PROBLEM — R00.1 BRADYCARDIA: Status: RESOLVED | Noted: 2023-09-05 | Resolved: 2025-01-17

## 2025-01-17 LAB
ANION GAP SERPL CALCULATED.3IONS-SCNC: 15 MMOL/L (ref 4–13)
ATRIAL RATE: 44 BPM
BUN SERPL-MCNC: 63 MG/DL (ref 5–25)
CALCIUM SERPL-MCNC: 7.3 MG/DL (ref 8.4–10.2)
CHLORIDE SERPL-SCNC: 96 MMOL/L (ref 96–108)
CO2 SERPL-SCNC: 24 MMOL/L (ref 21–32)
CREAT SERPL-MCNC: 8.06 MG/DL (ref 0.6–1.3)
ERYTHROCYTE [DISTWIDTH] IN BLOOD BY AUTOMATED COUNT: 16.7 % (ref 11.6–15.1)
GFR SERPL CREATININE-BSD FRML MDRD: 6 ML/MIN/1.73SQ M
GLUCOSE P FAST SERPL-MCNC: 85 MG/DL (ref 65–99)
GLUCOSE SERPL-MCNC: 85 MG/DL (ref 65–140)
HCT VFR BLD AUTO: 33.1 % (ref 36.5–49.3)
HGB BLD-MCNC: 10.5 G/DL (ref 12–17)
MAGNESIUM SERPL-MCNC: 2.2 MG/DL (ref 1.9–2.7)
MCH RBC QN AUTO: 30.4 PG (ref 26.8–34.3)
MCHC RBC AUTO-ENTMCNC: 31.7 G/DL (ref 31.4–37.4)
MCV RBC AUTO: 96 FL (ref 82–98)
P AXIS: 60 DEGREES
PHOSPHATE SERPL-MCNC: 6.9 MG/DL (ref 2.3–4.1)
PLATELET # BLD AUTO: 195 THOUSANDS/UL (ref 149–390)
PMV BLD AUTO: 10.7 FL (ref 8.9–12.7)
POTASSIUM SERPL-SCNC: 4.7 MMOL/L (ref 3.5–5.3)
PR INTERVAL: 194 MS
QRS AXIS: -10 DEGREES
QRSD INTERVAL: 94 MS
QT INTERVAL: 582 MS
QTC INTERVAL: 497 MS
RBC # BLD AUTO: 3.45 MILLION/UL (ref 3.88–5.62)
SODIUM SERPL-SCNC: 135 MMOL/L (ref 135–147)
T WAVE AXIS: 90 DEGREES
VENTRICULAR RATE: 44 BPM
WBC # BLD AUTO: 6.39 THOUSAND/UL (ref 4.31–10.16)

## 2025-01-17 PROCEDURE — 85027 COMPLETE CBC AUTOMATED: CPT | Performed by: NURSE PRACTITIONER

## 2025-01-17 PROCEDURE — 84100 ASSAY OF PHOSPHORUS: CPT | Performed by: NURSE PRACTITIONER

## 2025-01-17 PROCEDURE — 80048 BASIC METABOLIC PNL TOTAL CA: CPT | Performed by: NURSE PRACTITIONER

## 2025-01-17 PROCEDURE — 83735 ASSAY OF MAGNESIUM: CPT | Performed by: NURSE PRACTITIONER

## 2025-01-17 PROCEDURE — 99214 OFFICE O/P EST MOD 30 MIN: CPT | Performed by: INTERNAL MEDICINE

## 2025-01-17 PROCEDURE — 99232 SBSQ HOSP IP/OBS MODERATE 35: CPT

## 2025-01-17 PROCEDURE — 93010 ELECTROCARDIOGRAM REPORT: CPT | Performed by: INTERNAL MEDICINE

## 2025-01-17 RX ORDER — GABAPENTIN 100 MG/1
100 CAPSULE ORAL ONCE
Status: COMPLETED | OUTPATIENT
Start: 2025-01-17 | End: 2025-01-17

## 2025-01-17 RX ADMIN — SEVELAMER HYDROCHLORIDE 800 MG: 800 TABLET, FILM COATED ORAL at 17:24

## 2025-01-17 RX ADMIN — PANTOPRAZOLE SODIUM 40 MG: 40 TABLET, DELAYED RELEASE ORAL at 05:06

## 2025-01-17 RX ADMIN — ATORVASTATIN CALCIUM 80 MG: 80 TABLET, FILM COATED ORAL at 09:13

## 2025-01-17 RX ADMIN — GABAPENTIN 200 MG: 100 CAPSULE ORAL at 17:25

## 2025-01-17 RX ADMIN — NIFEDIPINE 30 MG: 30 TABLET, EXTENDED RELEASE ORAL at 17:25

## 2025-01-17 RX ADMIN — SODIUM ZIRCONIUM CYCLOSILICATE 10 G: 10 POWDER, FOR SUSPENSION ORAL at 09:17

## 2025-01-17 RX ADMIN — APIXABAN 5 MG: 5 TABLET, FILM COATED ORAL at 09:13

## 2025-01-17 RX ADMIN — GABAPENTIN 200 MG: 100 CAPSULE ORAL at 09:13

## 2025-01-17 RX ADMIN — SEVELAMER HYDROCHLORIDE 800 MG: 800 TABLET, FILM COATED ORAL at 07:32

## 2025-01-17 RX ADMIN — GABAPENTIN 100 MG: 100 CAPSULE ORAL at 03:00

## 2025-01-17 RX ADMIN — APIXABAN 5 MG: 5 TABLET, FILM COATED ORAL at 17:24

## 2025-01-17 RX ADMIN — ALLOPURINOL 100 MG: 100 TABLET ORAL at 09:13

## 2025-01-17 RX ADMIN — SEVELAMER HYDROCHLORIDE 800 MG: 800 TABLET, FILM COATED ORAL at 12:05

## 2025-01-17 RX ADMIN — ASPIRIN 81 MG CHEWABLE TABLET 81 MG: 81 TABLET CHEWABLE at 09:13

## 2025-01-17 NOTE — PROGRESS NOTES
Progress Note - Nephrology   Name: Naresh Carpio 64 y.o. male I MRN: [id number]  Unit/Bed#: [address] [place] [address] Date of Admission: 1/16/2025   Date of Service: 1/17/2025 I Hospital Day: 0     Assessment & Plan  Hyperkalemia (Resolved: 1/17/2025)  -- Underwent hemodialysis yesterday  --Potassium normal at 4.7  --Low potassium diet  ESRD (end stage renal disease) on dialysis ([facility])  Lab Results   Component Value Date    EGFR 6 01/17/2025    EGFR 4 01/16/2025    EGFR 6 12/21/2024    CREATININE 8.06 (H) 01/17/2025    CREATININE 11.10 (H) 01/16/2025    CREATININE 7.74 (H) 12/21/2024   -- In center hemodialysis at [facility] Tuesday Thursday Saturday  --Last dialysis was on Saturday, January 11, missed dialysis on Tuesday, January 14  --Access: Right IJ permacath  --Dry weight 99 kg  -- Patient underwent hemodialysis yesterday, ultrafiltrated 4.5 L yesterday.  --Plan for next dialysis tomorrow January 18th  Primary hypertension  -- Blood pressure much improved.  Anemia in ESRD (end-stage renal disease)  ([facility])  Lab Results   Component Value Date    EGFR 6 01/17/2025    EGFR 4 01/16/2025    EGFR 6 12/21/2024    CREATININE 8.06 (H) 01/17/2025    CREATININE 11.10 (H) 01/16/2025    CREATININE 7.74 (H) 12/21/2024   -- Hemoglobin stable and at target  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, on chronic dialysis ([facility])  Lab Results   Component Value Date    EGFR 6 01/17/2025    EGFR 4 01/16/2025    EGFR 6 12/21/2024    CREATININE 8.06 (H) 01/17/2025    CREATININE 11.10 (H) 01/16/2025    CREATININE 7.74 (H) 12/21/2024   -- Restart home medications  Hypertension  -- Blood pressure well-controlled continue current management  History of TIA (transient ischemic attack)    Diarrhea    Bradycardia (Resolved: 1/17/2025)  -- Resolved with resolution of the potassium  Paroxysmal atrial fibrillation (HCC)    Flu-like symptoms      I have reviewed the nephrology recommendations  including assessment and plan, with primary team, and we are in agreement with renal plan including the information outlined above. Ok for discharge from Nephrology service perspective.    Subjective   Brief History of Admission - 64-year-old male with a history of ESRD who presents for flulike symptoms missed dialysis on Tuesday. Nephrology counts for hyperkalemia.     Underwent dialysis yesterday    Objective :  Temp:  [96 °F (35.6 °C)-97.9 °F (36.6 °C)] 97.9 °F (36.6 °C)  HR:  [34-65] 60  BP: (113-206)/(52-88) 127/52  Resp:  [14-31] 18  SpO2:  [91 %-100 %] 93 %  O2 Device: None (Room air)  Nasal Cannula O2 Flow Rate (L/min):  [2 L/min] 2 L/min    Current Weight: Weight - Scale: 106 kg (232 lb 9.6 oz)  First Weight: Weight - Scale: 106 kg (232 lb 9.6 oz)  I/O         01/15 0701  01/16 0700 01/16 0701  01/17 0700 01/17 0701  01/18 0700    I.V. (mL/kg)  500 (4.7)     IV Piggyback  500     Total Intake(mL/kg)  1000 (9.4)     Urine (mL/kg/hr)  200     Other  5000     Total Output  5200     Net  -4200                  Physical Exam  Vitals and nursing note reviewed.   Constitutional:       General: He is not in acute distress.     Appearance: He is well-developed. He is not diaphoretic.   HENT:      Head: Normocephalic and atraumatic.   Eyes:      General: No scleral icterus.     Pupils: Pupils are equal, round, and reactive to light.   Cardiovascular:      Rate and Rhythm: Normal rate and regular rhythm.      Heart sounds: Normal heart sounds. No murmur heard.     No friction rub. No gallop.   Pulmonary:      Effort: Pulmonary effort is normal. No respiratory distress.      Breath sounds: Normal breath sounds. No wheezing or rales.   Chest:      Chest wall: No tenderness.   Abdominal:      General: Bowel sounds are normal. There is no distension.      Palpations: Abdomen is soft.      Tenderness: There is no abdominal tenderness. There is no rebound.   Musculoskeletal:         General: Normal range of motion.       Cervical back: Normal range of motion and neck supple.   Skin:     Findings: No rash.   Neurological:      Mental Status: He is alert and oriented to person, place, and time.         Medications:    Current Facility-Administered Medications:     acetaminophen (TYLENOL) tablet 650 mg, 650 mg, Oral, Once, GRANT Valdivia    acetaminophen (TYLENOL) tablet 650 mg, 650 mg, Oral, Q6H PRN, GRANT Valdivia    allopurinol (ZYLOPRIM) tablet 100 mg, 100 mg, Oral, Daily, GRANT Valdivia, 100 mg at 01/17/25 0913    apixaban (ELIQUIS) tablet 5 mg, 5 mg, Oral, BID, GRANT Valdivia, 5 mg at 01/17/25 0913    aspirin chewable tablet 81 mg, 81 mg, Oral, Daily, GRANT Valdivia, 81 mg at 01/17/25 0913    atorvastatin (LIPITOR) tablet 80 mg, 80 mg, Oral, Daily, GRANT Valdivia, 80 mg at 01/17/25 0913    gabapentin (NEURONTIN) capsule 200 mg, 200 mg, Oral, BID, GRANT Valdivia, 200 mg at 01/17/25 0913    NIFEdipine (PROCARDIA XL) 24 hr tablet 30 mg, 30 mg, Oral, After Dinner, GRANT Valdivia, 30 mg at 01/16/25 1755    ondansetron (ZOFRAN) injection 4 mg, 4 mg, Intravenous, Once, GRANT Valdivia    pantoprazole (PROTONIX) EC tablet 40 mg, 40 mg, Oral, Early Morning, GRANT Valdivia, 40 mg at 01/17/25 0506    polyethylene glycol (MIRALAX) packet 17 g, 17 g, Oral, Daily, GRANT Valdivia    sevelamer (RENAGEL) tablet 800 mg, 800 mg, Oral, TID With Meals, GRANT Valdivia, 800 mg at 01/17/25 0732    Sodium Zirconium Cyclosilicate (Lokelma) 10 g, 10 g, Oral, Daily, GRANT Valdivia, 10 g at 01/16/25 1230      Lab Results: I have reviewed the following results:  Results from last 7 days   Lab Units 01/17/25  0506 01/16/25  1008   WBC Thousand/uL 6.39 6.74   HEMOGLOBIN g/dL 10.5* 10.8*   HEMATOCRIT % 33.1* 35.7*   PLATELETS Thousands/uL 195 201   POTASSIUM mmol/L 4.7  "7.3*   CHLORIDE mmol/L 96 101   CO2 mmol/L 24 18*   BUN mg/dL 63* 106*   CREATININE mg/dL 8.06* 11.10*   CALCIUM mg/dL 7.3* 7.6*   MAGNESIUM mg/dL 2.2 2.7   PHOSPHORUS mg/dL 6.9* 9.1*   ALBUMIN g/dL  --  4.2       Administrative Statements   I have spent a total time of 10 minutes in caring for this patient on the day of the visit/encounter including Counseling / Coordination of care, Documenting in the medical record, Obtaining or reviewing history  , and Communicating with other healthcare professionals .  Portions of the record may have been created with voice recognition software. Occasional wrong word or \"sound a like\" substitutions may have occurred due to the inherent limitations of voice recognition software. Read the chart carefully and recognize, using context, where substitutions have occurred.If you have any questions, please contact the dictating provider.  "

## 2025-01-17 NOTE — ASSESSMENT & PLAN NOTE
Lab Results   Component Value Date    EGFR 6 01/17/2025    EGFR 4 01/16/2025    EGFR 6 12/21/2024    CREATININE 8.06 (H) 01/17/2025    CREATININE 11.10 (H) 01/16/2025    CREATININE 7.74 (H) 12/21/2024   -- Hemoglobin stable and at target

## 2025-01-17 NOTE — ASSESSMENT & PLAN NOTE
Continue nifedipine 30 mg daily  BP initially elevated on arrival, likely in setting of volume overload  Now improved

## 2025-01-17 NOTE — ASSESSMENT & PLAN NOTE
Patient noted to be bradycardic with HR documented in 30s on [place]  BP stable, asymptomatic  Patient is not on any marek blocking agents  On telemetry, no bradycardia noted, now discontinued  Per chart review, patient has history of bradycardia and nifedipine was decreased to 30 mg daily

## 2025-01-17 NOTE — PROGRESS NOTES
Progress Note - Hospitalist   Name: Naresh Carpio 64 y.o. male I MRN: [id number]  Unit/Bed#: [address] [place] [address] Date of Admission: 1/16/2025   Date of Service: 1/17/2025 I Hospital Day: 0    Assessment & Plan  Flu-like symptoms  Patient reports last week he experienced low grade fevers, body aches, headache, and diarrhea which caused him to miss dialysis on Tuesday.  Flu/COVID/RSV negative  Denies current symptoms  Patient afebrile, no leukocytosis  Check stool studies if further diarrhea  ESRD (end stage renal disease) on dialysis ([facility])  Lab Results   Component Value Date    EGFR 6 01/17/2025    EGFR 4 01/16/2025    EGFR 6 12/21/2024    CREATININE 8.06 (H) 01/17/2025    CREATININE 11.10 (H) 01/16/2025    CREATININE 7.74 (H) 12/21/2024   Dialysis TTS  Missed Tuesday due to dialysis, presented with hyperkalemia now resolved  Underwent hemodialysis 1/16 ultrafiltrated with 4.5 L  Plan for dialysis tomorrow  Primary hypertension  Continue nifedipine 30 mg daily  BP initially elevated on arrival, likely in setting of volume overload  Now improved  Anemia in ESRD (end-stage renal disease)  ([facility])  Hgb stable at baseline  History of TIA (transient ischemic attack)  Hx of TIA  Continue Eliquis, aspirin, lipitor  Paroxysmal atrial fibrillation ([facility])  Continue Eliquis 5 mg bid  Bradycardia (Resolved: 1/17/2025)  Patient noted to be bradycardic with HR documented in 30s on elmira  BP stable, asymptomatic  Patient is not on any marek blocking agents  On telemetry, no bradycardia noted, now discontinued  Per chart review, patient has history of bradycardia and nifedipine was decreased to 30 mg daily    VTE Pharmacologic Prophylaxis:   Moderate Risk (Score 3-4) - Pharmacological DVT Prophylaxis Ordered: apixaban (Eliquis).    Mobility:   Basic Mobility Inpatient Raw Score: 16  JH-HLM Goal: 5: Stand one or more mins  JH-HLM Achieved: 6: Walk 10 steps or more  JH-HLM Goal achieved. Continue to encourage appropriate  mobility.    Patient Centered Rounds: I performed bedside rounds with nursing staff today.   Discussions with Specialists or Other Care Team Provider: nephrology, rn, cm    Education and Discussions with Family / Patient: Patient declined call to .     Current Length of Stay: 0 day(s)  Current Patient Status: Observation   Certification Statement: The patient will continue to require additional inpatient hospital stay due to dialysis, monitor HR, stool studies if further diarrhea  Discharge Plan: Anticipate discharge tomorrow to home.    Code Status: Level 1 - Full Code    Subjective   Patient reports feeling well today. States last week he had headache, body aches, fevers, and diarrhea. Has not had diarrhea since admission. Denies current symptoms. States he never had chest pain, cough, or shortness of breath. Has good appetite. No abdominal pain or vomiting. No dizziness, palpitations, or lightheadedness.    Objective :  Temp:  [96 °F (35.6 °C)-97.9 °F (36.6 °C)] 97.9 °F (36.6 °C)  HR:  [34-65] 60  BP: (127-184)/(52-88) 127/52  Resp:  [16-19] 18  SpO2:  [91 %-100 %] 93 %  O2 Device: None (Room air)  Nasal Cannula O2 Flow Rate (L/min):  [2 L/min] 2 L/min    Body mass index is 30.69 kg/m².     Input and Output Summary (last 24 hours):     Intake/Output Summary (Last 24 hours) at 1/17/2025 1227  Last data filed at 1/17/2025 0025  Gross per 24 hour   Intake 500 ml   Output 5200 ml   Net -4700 ml     Physical Exam  Vitals and nursing note reviewed.   Constitutional:       General: He is not in acute distress.     Appearance: He is well-developed.   Cardiovascular:      Rate and Rhythm: Normal rate and regular rhythm.   Pulmonary:      Effort: Pulmonary effort is normal. No respiratory distress.      Breath sounds: Normal breath sounds.   Abdominal:      Palpations: Abdomen is soft.      Tenderness: There is no abdominal tenderness.   Musculoskeletal:         General: No swelling.   Skin:     General: Skin  is warm and dry.      Capillary Refill: Capillary refill takes less than 2 seconds.   Neurological:      Mental Status: He is alert.   Psychiatric:         Mood and Affect: Mood normal.       Lines/Drains:  Lines/Drains/Airways       Active Status       Name Placement date Placement time Site Days    HD Permanent Double Catheter --  --  Internal jugular  --                          Lab Results: I have reviewed the following results:   Results from last 7 days   Lab Units 01/17/25  0506 01/16/25  1008   WBC Thousand/uL 6.39 6.74   HEMOGLOBIN g/dL 10.5* 10.8*   HEMATOCRIT % 33.1* 35.7*   PLATELETS Thousands/uL 195 201   SEGS PCT %  --  66   LYMPHO PCT %  --  18   MONO PCT %  --  10   EOS PCT %  --  5     Results from last 7 days   Lab Units 01/17/25  0506 01/16/25  1008   SODIUM mmol/L 135 136   POTASSIUM mmol/L 4.7 7.3*   CHLORIDE mmol/L 96 101   CO2 mmol/L 24 18*   BUN mg/dL 63* 106*   CREATININE mg/dL 8.06* 11.10*   ANION GAP mmol/L 15* 17*   CALCIUM mg/dL 7.3* 7.6*   ALBUMIN g/dL  --  4.2   TOTAL BILIRUBIN mg/dL  --  0.43   ALK PHOS U/L  --  147*   ALT U/L  --  94*   AST U/L  --  31   GLUCOSE RANDOM mg/dL 85 110                       Recent Cultures (last 7 days):         Imaging Results Review: No pertinent imaging studies reviewed.  Other Study Results Review: No additional pertinent studies reviewed.    Last 24 Hours Medication List:     Current Facility-Administered Medications:     acetaminophen (TYLENOL) tablet 650 mg, Once    acetaminophen (TYLENOL) tablet 650 mg, Q6H PRN    allopurinol (ZYLOPRIM) tablet 100 mg, Daily    apixaban (ELIQUIS) tablet 5 mg, BID    aspirin chewable tablet 81 mg, Daily    atorvastatin (LIPITOR) tablet 80 mg, Daily    gabapentin (NEURONTIN) capsule 200 mg, BID    NIFEdipine (PROCARDIA XL) 24 hr tablet 30 mg, After Dinner    ondansetron (ZOFRAN) injection 4 mg, Once    pantoprazole (PROTONIX) EC tablet 40 mg, Early Morning    polyethylene glycol (MIRALAX) packet 17 g, Daily     sevelamer (RENAGEL) tablet 800 mg, TID With Meals    Sodium Zirconium Cyclosilicate (Lokelma) 10 g, Daily    Administrative Statements   Today, Patient Was Seen By: Nallely Pisano PA-C    **Please Note: This note may have been constructed using a voice recognition system.**

## 2025-01-17 NOTE — PLAN OF CARE
Problem: Potential for Falls  Goal: Patient will remain free of falls  Description: INTERVENTIONS:  - Educate patient/family on patient safety including physical limitations  - Instruct patient to call for assistance with activity   - Consult OT/PT to assist with strengthening/mobility   - Keep Call bell within reach  - Keep bed low and locked with side rails adjusted as appropriate  - Keep care items and personal belongings within reach  - Initiate and maintain comfort rounds  - Make Fall Risk Sign visible to staff  - Offer Toileting every 2 Hours, in advance of need  - Initiate/Maintain bed alarm  - Obtain necessary fall risk management equipment: fall risk sign on door  - Apply yellow socks and bracelet for high fall risk patients  - Consider moving patient to room near nurses station  Outcome: Progressing     Problem: METABOLIC, FLUID AND ELECTROLYTES - ADULT  Goal: Electrolytes maintained within normal limits  Description: INTERVENTIONS:  - Monitor labs and assess patient for signs and symptoms of electrolyte imbalances  - Administer electrolyte replacement as ordered  - Monitor response to electrolyte replacements, including repeat lab results as appropriate  - Instruct patient on fluid and nutrition as appropriate  Outcome: Progressing  Goal: Fluid balance maintained  Description: INTERVENTIONS:  - Monitor labs   - Monitor I/O and WT  - Instruct patient on fluid and nutrition as appropriate  - Assess for signs & symptoms of volume excess or deficit  Outcome: Progressing  Goal: Glucose maintained within target range  Description: INTERVENTIONS:  - Monitor Blood Glucose as ordered  - Assess for signs and symptoms of hyperglycemia and hypoglycemia  - Administer ordered medications to maintain glucose within target range  - Assess nutritional intake and initiate nutrition service referral as needed  Outcome: Progressing     Problem: PAIN - ADULT  Goal: Verbalizes/displays adequate comfort level or baseline  comfort level  Description: Interventions:  - Encourage patient to monitor pain and request assistance  - Assess pain using appropriate pain scale  - Administer analgesics based on type and severity of pain and evaluate response  - Implement non-pharmacological measures as appropriate and evaluate response  - Consider cultural and social influences on pain and pain management  - Notify physician/advanced practitioner if interventions unsuccessful or patient reports new pain  Outcome: Progressing     Problem: INFECTION - ADULT  Goal: Absence or prevention of progression during hospitalization  Description: INTERVENTIONS:  - Assess and monitor for signs and symptoms of infection  - Monitor lab/diagnostic results  - Monitor all insertion sites, i.e. indwelling lines, tubes, and drains  - Monitor endotracheal if appropriate and nasal secretions for changes in amount and color  - [place] appropriate cooling/warming therapies per order  - Administer medications as ordered  - Instruct and encourage patient and family to use good hand hygiene technique  - Identify and instruct in appropriate isolation precautions for identified infection/condition  Outcome: Progressing  Goal: Absence of fever/infection during neutropenic period  Description: INTERVENTIONS:  - Monitor WBC    Outcome: Progressing     Problem: SAFETY ADULT  Goal: Patient will remain free of falls  Description: INTERVENTIONS:  - Educate patient/family on patient safety including physical limitations  - Instruct patient to call for assistance with activity   - Consult OT/PT to assist with strengthening/mobility   - Keep Call bell within reach  - Keep bed low and locked with side rails adjusted as appropriate  - Keep care items and personal belongings within reach  - Initiate and maintain comfort rounds  - Make Fall Risk Sign visible to staff  - Offer Toileting every 2 Hours, in advance of need  - Initiate/Maintain bed alarm  - Obtain necessary fall risk  management equipment: fall risk sign on door  - Apply yellow socks and bracelet for high fall risk patients  - Consider moving patient to room near nurses station  Outcome: Progressing  Goal: Maintain or return to baseline ADL function  Description: INTERVENTIONS:  -  Assess patient's ability to carry out ADLs; assess patient's baseline for ADL function and identify physical deficits which impact ability to perform ADLs (bathing, care of mouth/teeth, toileting, grooming, dressing, etc.)  - Assess/evaluate cause of self-care deficits   - Assess range of motion  - Assess patient's mobility; develop plan if impaired  - Assess patient's need for assistive devices and provide as appropriate  - Encourage maximum independence but intervene and supervise when necessary  - Involve family in performance of ADLs  - Assess for home care needs following discharge   - Consider OT consult to assist with ADL evaluation and planning for discharge  - Provide patient education as appropriate  Outcome: Progressing  Goal: Maintains/Returns to pre admission functional level  Description: INTERVENTIONS:  - Perform AM-PAC 6 Click Basic Mobility/ Daily Activity assessment daily.  - Set and communicate daily mobility goal to care team and patient/family/caregiver.   - Collaborate with rehabilitation services on mobility goals if consulted  - Perform Range of Motion 2 times a day.  - Reposition patient every 2 hours.  - Dangle patient 2 times a day  - Stand patient 2 times a day  - Ambulate patient 3 times a day  - Out of bed to chair 3 times a day   - Out of bed for meals 3 times a day  - Out of bed for toileting  - Record patient progress and toleration of activity level   Outcome: Progressing     Problem: DISCHARGE PLANNING  Goal: Discharge to home or other facility with appropriate resources  Description: INTERVENTIONS:  - Identify barriers to discharge w/patient and caregiver  - Arrange for needed discharge resources and transportation  as appropriate  - Identify discharge learning needs (meds, wound care, etc.)  - Arrange for interpretive services to assist at discharge as needed  - Refer to Case Management Department for coordinating discharge planning if the patient needs post-hospital services based on physician/advanced practitioner order or complex needs related to functional status, cognitive ability, or social support system  Outcome: Progressing     Problem: Knowledge Deficit  Goal: Patient/family/caregiver demonstrates understanding of disease process, treatment plan, medications, and discharge instructions  Description: Complete learning assessment and assess knowledge base.  Interventions:  - Provide teaching at level of understanding  - Provide teaching via preferred learning methods  Outcome: Progressing     Problem: Nutrition/Hydration-ADULT  Goal: Nutrient/Hydration intake appropriate for improving, restoring or maintaining nutritional needs  Description: Monitor and assess patient's nutrition/hydration status for malnutrition. Collaborate with interdisciplinary team and initiate plan and interventions as ordered.  Monitor patient's weight and dietary intake as ordered or per policy. Utilize nutrition screening tool and intervene as necessary. Determine patient's food preferences and provide high-protein, high-caloric foods as appropriate.     INTERVENTIONS:  - Monitor oral intake, urinary output, labs, and treatment plans  - Assess nutrition and hydration status and recommend course of action  - Evaluate amount of meals eaten  - Assist patient with eating if necessary   - Allow adequate time for meals  - Recommend/ encourage appropriate diets, oral nutritional supplements, and vitamin/mineral supplements  - Order, calculate, and assess calorie counts as needed  - Recommend, monitor, and adjust tube feedings and TPN/PPN based on assessed needs  - Assess need for intravenous fluids  - Provide specific nutrition/hydration education as  appropriate  - Include patient/family/caregiver in decisions related to nutrition  Outcome: Progressing     Problem: NEUROSENSORY - ADULT  Goal: Achieves stable or improved neurological status  Description: INTERVENTIONS  - Monitor and report changes in neurological status  - Monitor vital signs such as temperature, blood pressure, glucose, and any other labs ordered   - Initiate measures to prevent increased intracranial pressure  - Monitor for seizure activity and implement precautions if appropriate      Outcome: Progressing  Goal: Achieves maximal functionality and self care  Description: INTERVENTIONS  - Monitor swallowing and airway patency with patient fatigue and changes in neurological status  - Encourage and assist patient to increase activity and self care.   - Encourage visually impaired, hearing impaired and aphasic patients to use assistive/communication devices  Outcome: Progressing     Problem: CARDIOVASCULAR - ADULT  Goal: Maintains optimal cardiac output and hemodynamic stability  Description: INTERVENTIONS:  - Monitor I/O, vital signs and rhythm  - Monitor for S/S and trends of decreased cardiac output  - Administer and titrate ordered vasoactive medications to optimize hemodynamic stability  - Assess quality of pulses, skin color and temperature  - Assess for signs of decreased coronary artery perfusion  - Instruct patient to report change in severity of symptoms  Outcome: Progressing  Goal: Absence of cardiac dysrhythmias or at baseline rhythm  Description: INTERVENTIONS:  - Continuous cardiac monitoring, vital signs, obtain 12 lead EKG if ordered  - Administer antiarrhythmic and heart rate control medications as ordered  - Monitor electrolytes and administer replacement therapy as ordered  Outcome: Progressing     Problem: RESPIRATORY - ADULT  Goal: Achieves optimal ventilation and oxygenation  Description: INTERVENTIONS:  - Assess for changes in respiratory status  - Assess for changes in  mentation and behavior  - Position to facilitate oxygenation and minimize respiratory effort  - Oxygen administered by appropriate delivery if ordered  - Initiate smoking cessation education as indicated  - Encourage broncho-pulmonary hygiene including cough, deep breathe, Incentive Spirometry  - Assess the need for suctioning and aspirate as needed  - Assess and instruct to report SOB or any respiratory difficulty  - Respiratory Therapy support as indicated  Outcome: Progressing     Problem: GASTROINTESTINAL - ADULT  Goal: Minimal or absence of nausea and/or vomiting  Description: INTERVENTIONS:  - Administer IV fluids if ordered to ensure adequate hydration  - Maintain NPO status until nausea and vomiting are resolved  - Nasogastric tube if ordered  - Administer ordered antiemetic medications as needed  - Provide nonpharmacologic comfort measures as appropriate  - Advance diet as tolerated, if ordered  - Consider nutrition services referral to assist patient with adequate nutrition and appropriate food choices  Outcome: Progressing  Goal: Maintains or returns to baseline bowel function  Description: INTERVENTIONS:  - Assess bowel function  - Encourage oral fluids to ensure adequate hydration  - Administer IV fluids if ordered to ensure adequate hydration  - Administer ordered medications as needed  - Encourage mobilization and activity  - Consider nutritional services referral to assist patient with adequate nutrition and appropriate food choices  Outcome: Progressing  Goal: Maintains adequate nutritional intake  Description: INTERVENTIONS:  - Monitor percentage of each meal consumed  - Identify factors contributing to decreased intake, treat as appropriate  - Assist with meals as needed  - Monitor I&O, weight, and lab values if indicated  - Obtain nutrition services referral as needed  Outcome: Progressing     Problem: GENITOURINARY - ADULT  Goal: Maintains or returns to baseline urinary function  Description:  INTERVENTIONS:  - Assess urinary function  - Encourage oral fluids to ensure adequate hydration if ordered  - Administer IV fluids as ordered to ensure adequate hydration  - Administer ordered medications as needed  - Offer frequent toileting  - Follow urinary retention protocol if ordered  Outcome: Progressing     Problem: HEMATOLOGIC - ADULT  Goal: Maintains hematologic stability  Description: INTERVENTIONS  - Assess for signs and symptoms of bleeding or hemorrhage  - Monitor labs  - Administer supportive blood products/factors as ordered and appropriate  Outcome: Progressing

## 2025-01-17 NOTE — ASSESSMENT & PLAN NOTE
Lab Results   Component Value Date    EGFR 6 01/17/2025    EGFR 4 01/16/2025    EGFR 6 12/21/2024    CREATININE 8.06 (H) 01/17/2025    CREATININE 11.10 (H) 01/16/2025    CREATININE 7.74 (H) 12/21/2024   Dialysis TTS  Missed Tuesday due to dialysis, presented with hyperkalemia now resolved  Underwent hemodialysis 1/16 ultrafiltrated with 4.5 L  Plan for dialysis tomorrow

## 2025-01-17 NOTE — ASSESSMENT & PLAN NOTE
Lab Results   Component Value Date    EGFR 6 01/17/2025    EGFR 4 01/16/2025    EGFR 6 12/21/2024    CREATININE 8.06 (H) 01/17/2025    CREATININE 11.10 (H) 01/16/2025    CREATININE 7.74 (H) 12/21/2024   -- In center hemodialysis at [facility] Tuesday Thursday Saturday  --Last dialysis was on Saturday, January 11, missed dialysis on Tuesday, January 14  --Access: Right IJ permacath  --Dry weight 99 kg  -- Patient underwent hemodialysis yesterday, ultrafiltrated 4.5 L yesterday.  --Plan for next dialysis tomorrow January 18th

## 2025-01-17 NOTE — ASSESSMENT & PLAN NOTE
Patient reports last week he experienced low grade fevers, body aches, headache, and diarrhea which caused him to miss dialysis on Tuesday.  Flu/COVID/RSV negative  Denies current symptoms  Patient afebrile, no leukocytosis  Check stool studies if further diarrhea

## 2025-01-17 NOTE — PLAN OF CARE
Problem: Potential for Falls  Goal: Patient will remain free of falls  Description: INTERVENTIONS:  - Educate patient/family on patient safety including physical limitations  - Instruct patient to call for assistance with activity   - Consult OT/PT to assist with strengthening/mobility   - Keep Call bell within reach  - Keep bed low and locked with side rails adjusted as appropriate  - Keep care items and personal belongings within reach  - Initiate and maintain comfort rounds  - Make Fall Risk Sign visible to staff  - Offer Toileting every 2 Hours, in advance of need  - Initiate/Maintain bed alarm  - Obtain necessary fall risk management equipment: fall risk sign on door  - Apply yellow socks and bracelet for high fall risk patients  - Consider moving patient to room near nurses station  Outcome: Progressing     Problem: METABOLIC, FLUID AND ELECTROLYTES - ADULT  Goal: Electrolytes maintained within normal limits  Description: INTERVENTIONS:  - Monitor labs and assess patient for signs and symptoms of electrolyte imbalances  - Administer electrolyte replacement as ordered  - Monitor response to electrolyte replacements, including repeat lab results as appropriate  - Instruct patient on fluid and nutrition as appropriate  Outcome: Progressing  Goal: Fluid balance maintained  Description: INTERVENTIONS:  - Monitor labs   - Monitor I/O and WT  - Instruct patient on fluid and nutrition as appropriate  - Assess for signs & symptoms of volume excess or deficit  Outcome: Progressing  Goal: Glucose maintained within target range  Description: INTERVENTIONS:  - Monitor Blood Glucose as ordered  - Assess for signs and symptoms of hyperglycemia and hypoglycemia  - Administer ordered medications to maintain glucose within target range  - Assess nutritional intake and initiate nutrition service referral as needed  Outcome: Progressing     Problem: PAIN - ADULT  Goal: Verbalizes/displays adequate comfort level or baseline  comfort level  Description: Interventions:  - Encourage patient to monitor pain and request assistance  - Assess pain using appropriate pain scale  - Administer analgesics based on type and severity of pain and evaluate response  - Implement non-pharmacological measures as appropriate and evaluate response  - Consider cultural and social influences on pain and pain management  - Notify physician/advanced practitioner if interventions unsuccessful or patient reports new pain  Outcome: Progressing     Problem: INFECTION - ADULT  Goal: Absence or prevention of progression during hospitalization  Description: INTERVENTIONS:  - Assess and monitor for signs and symptoms of infection  - Monitor lab/diagnostic results  - Monitor all insertion sites, i.e. indwelling lines, tubes, and drains  - Monitor endotracheal if appropriate and nasal secretions for changes in amount and color  - Hood appropriate cooling/warming therapies per order  - Administer medications as ordered  - Instruct and encourage patient and family to use good hand hygiene technique  - Identify and instruct in appropriate isolation precautions for identified infection/condition  Outcome: Progressing  Goal: Absence of fever/infection during neutropenic period  Description: INTERVENTIONS:  - Monitor WBC    Outcome: Progressing     Problem: SAFETY ADULT  Goal: Patient will remain free of falls  Description: INTERVENTIONS:  - Educate patient/family on patient safety including physical limitations  - Instruct patient to call for assistance with activity   - Consult OT/PT to assist with strengthening/mobility   - Keep Call bell within reach  - Keep bed low and locked with side rails adjusted as appropriate  - Keep care items and personal belongings within reach  - Initiate and maintain comfort rounds  - Make Fall Risk Sign visible to staff  - Offer Toileting every 2 Hours, in advance of need  - Initiate/Maintain bed alarm  - Obtain necessary fall risk  management equipment: fall risk sign on door  - Apply yellow socks and bracelet for high fall risk patients  - Consider moving patient to room near nurses station  Outcome: Progressing  Goal: Maintain or return to baseline ADL function  Description: INTERVENTIONS:  -  Assess patient's ability to carry out ADLs; assess patient's baseline for ADL function and identify physical deficits which impact ability to perform ADLs (bathing, care of mouth/teeth, toileting, grooming, dressing, etc.)  - Assess/evaluate cause of self-care deficits   - Assess range of motion  - Assess patient's mobility; develop plan if impaired  - Assess patient's need for assistive devices and provide as appropriate  - Encourage maximum independence but intervene and supervise when necessary  - Involve family in performance of ADLs  - Assess for home care needs following discharge   - Consider OT consult to assist with ADL evaluation and planning for discharge  - Provide patient education as appropriate  Outcome: Progressing  Goal: Maintains/Returns to pre admission functional level  Description: INTERVENTIONS:  - Perform AM-PAC 6 Click Basic Mobility/ Daily Activity assessment daily.  - Set and communicate daily mobility goal to care team and patient/family/caregiver.   - Collaborate with rehabilitation services on mobility goals if consulted  - Perform Range of Motion 2 times a day.  - Reposition patient every 2 hours.  - Dangle patient 2 times a day  - Stand patient 2 times a day  - Ambulate patient 3 times a day  - Out of bed to chair 3 times a day   - Out of bed for meals 3 times a day  - Out of bed for toileting  - Record patient progress and toleration of activity level   Outcome: Progressing     Problem: DISCHARGE PLANNING  Goal: Discharge to home or other facility with appropriate resources  Description: INTERVENTIONS:  - Identify barriers to discharge w/patient and caregiver  - Arrange for needed discharge resources and transportation  as appropriate  - Identify discharge learning needs (meds, wound care, etc.)  - Arrange for interpretive services to assist at discharge as needed  - Refer to Case Management Department for coordinating discharge planning if the patient needs post-hospital services based on physician/advanced practitioner order or complex needs related to functional status, cognitive ability, or social support system  Outcome: Progressing     Problem: Knowledge Deficit  Goal: Patient/family/caregiver demonstrates understanding of disease process, treatment plan, medications, and discharge instructions  Description: Complete learning assessment and assess knowledge base.  Interventions:  - Provide teaching at level of understanding  - Provide teaching via preferred learning methods  Outcome: Progressing     Problem: Nutrition/Hydration-ADULT  Goal: Nutrient/Hydration intake appropriate for improving, restoring or maintaining nutritional needs  Description: Monitor and assess patient's nutrition/hydration status for malnutrition. Collaborate with interdisciplinary team and initiate plan and interventions as ordered.  Monitor patient's weight and dietary intake as ordered or per policy. Utilize nutrition screening tool and intervene as necessary. Determine patient's food preferences and provide high-protein, high-caloric foods as appropriate.     INTERVENTIONS:  - Monitor oral intake, urinary output, labs, and treatment plans  - Assess nutrition and hydration status and recommend course of action  - Evaluate amount of meals eaten  - Assist patient with eating if necessary   - Allow adequate time for meals  - Recommend/ encourage appropriate diets, oral nutritional supplements, and vitamin/mineral supplements  - Order, calculate, and assess calorie counts as needed  - Recommend, monitor, and adjust tube feedings and TPN/PPN based on assessed needs  - Assess need for intravenous fluids  - Provide specific nutrition/hydration education as  appropriate  - Include patient/family/caregiver in decisions related to nutrition  Outcome: Progressing     Problem: NEUROSENSORY - ADULT  Goal: Achieves stable or improved neurological status  Description: INTERVENTIONS  - Monitor and report changes in neurological status  - Monitor vital signs such as temperature, blood pressure, glucose, and any other labs ordered   - Initiate measures to prevent increased intracranial pressure  - Monitor for seizure activity and implement precautions if appropriate      Outcome: Progressing  Goal: Achieves maximal functionality and self care  Description: INTERVENTIONS  - Monitor swallowing and airway patency with patient fatigue and changes in neurological status  - Encourage and assist patient to increase activity and self care.   - Encourage visually impaired, hearing impaired and aphasic patients to use assistive/communication devices  Outcome: Progressing     Problem: CARDIOVASCULAR - ADULT  Goal: Maintains optimal cardiac output and hemodynamic stability  Description: INTERVENTIONS:  - Monitor I/O, vital signs and rhythm  - Monitor for S/S and trends of decreased cardiac output  - Administer and titrate ordered vasoactive medications to optimize hemodynamic stability  - Assess quality of pulses, skin color and temperature  - Assess for signs of decreased coronary artery perfusion  - Instruct patient to report change in severity of symptoms  Outcome: Progressing  Goal: Absence of cardiac dysrhythmias or at baseline rhythm  Description: INTERVENTIONS:  - Continuous cardiac monitoring, vital signs, obtain 12 lead EKG if ordered  - Administer antiarrhythmic and heart rate control medications as ordered  - Monitor electrolytes and administer replacement therapy as ordered  Outcome: Progressing     Problem: RESPIRATORY - ADULT  Goal: Achieves optimal ventilation and oxygenation  Description: INTERVENTIONS:  - Assess for changes in respiratory status  - Assess for changes in  mentation and behavior  - Position to facilitate oxygenation and minimize respiratory effort  - Oxygen administered by appropriate delivery if ordered  - Initiate smoking cessation education as indicated  - Encourage broncho-pulmonary hygiene including cough, deep breathe, Incentive Spirometry  - Assess the need for suctioning and aspirate as needed  - Assess and instruct to report SOB or any respiratory difficulty  - Respiratory Therapy support as indicated  Outcome: Progressing     Problem: GASTROINTESTINAL - ADULT  Goal: Minimal or absence of nausea and/or vomiting  Description: INTERVENTIONS:  - Administer IV fluids if ordered to ensure adequate hydration  - Maintain NPO status until nausea and vomiting are resolved  - Nasogastric tube if ordered  - Administer ordered antiemetic medications as needed  - Provide nonpharmacologic comfort measures as appropriate  - Advance diet as tolerated, if ordered  - Consider nutrition services referral to assist patient with adequate nutrition and appropriate food choices  Outcome: Progressing  Goal: Maintains or returns to baseline bowel function  Description: INTERVENTIONS:  - Assess bowel function  - Encourage oral fluids to ensure adequate hydration  - Administer IV fluids if ordered to ensure adequate hydration  - Administer ordered medications as needed  - Encourage mobilization and activity  - Consider nutritional services referral to assist patient with adequate nutrition and appropriate food choices  Outcome: Progressing  Goal: Maintains adequate nutritional intake  Description: INTERVENTIONS:  - Monitor percentage of each meal consumed  - Identify factors contributing to decreased intake, treat as appropriate  - Assist with meals as needed  - Monitor I&O, weight, and lab values if indicated  - Obtain nutrition services referral as needed  Outcome: Progressing     Problem: GENITOURINARY - ADULT  Goal: Maintains or returns to baseline urinary function  Description:  INTERVENTIONS:  - Assess urinary function  - Encourage oral fluids to ensure adequate hydration if ordered  - Administer IV fluids as ordered to ensure adequate hydration  - Administer ordered medications as needed  - Offer frequent toileting  - Follow urinary retention protocol if ordered  Outcome: Progressing     Problem: HEMATOLOGIC - ADULT  Goal: Maintains hematologic stability  Description: INTERVENTIONS  - Assess for signs and symptoms of bleeding or hemorrhage  - Monitor labs  - Administer supportive blood products/factors as ordered and appropriate  Outcome: Progressing

## 2025-01-17 NOTE — ASSESSMENT & PLAN NOTE
Lab Results   Component Value Date    EGFR 6 01/17/2025    EGFR 4 01/16/2025    EGFR 6 12/21/2024    CREATININE 8.06 (H) 01/17/2025    CREATININE 11.10 (H) 01/16/2025    CREATININE 7.74 (H) 12/21/2024   -- Restart home medications

## 2025-01-18 ENCOUNTER — APPOINTMENT (INPATIENT)
Dept: DIALYSIS | Facility: HOSPITAL | Age: 65
DRG: 640 | End: 2025-01-18
Payer: MEDICARE

## 2025-01-18 VITALS
OXYGEN SATURATION: 92 % | RESPIRATION RATE: 16 BRPM | TEMPERATURE: 97.6 F | SYSTOLIC BLOOD PRESSURE: 164 MMHG | DIASTOLIC BLOOD PRESSURE: 63 MMHG | HEART RATE: 69 BPM | WEIGHT: 235.6 LBS | BODY MASS INDEX: 31.08 KG/M2

## 2025-01-18 PROBLEM — R68.89 FLU-LIKE SYMPTOMS: Status: RESOLVED | Noted: 2025-01-16 | Resolved: 2025-01-18

## 2025-01-18 LAB
ANION GAP SERPL CALCULATED.3IONS-SCNC: 15 MMOL/L (ref 4–13)
BUN SERPL-MCNC: 73 MG/DL (ref 5–25)
CALCIUM SERPL-MCNC: 6.8 MG/DL (ref 8.4–10.2)
CHLORIDE SERPL-SCNC: 98 MMOL/L (ref 96–108)
CO2 SERPL-SCNC: 22 MMOL/L (ref 21–32)
CREAT SERPL-MCNC: 9.36 MG/DL (ref 0.6–1.3)
ERYTHROCYTE [DISTWIDTH] IN BLOOD BY AUTOMATED COUNT: 16.4 % (ref 11.6–15.1)
GFR SERPL CREATININE-BSD FRML MDRD: 5 ML/MIN/1.73SQ M
GLUCOSE SERPL-MCNC: 106 MG/DL (ref 65–140)
HCT VFR BLD AUTO: 33.1 % (ref 36.5–49.3)
HGB BLD-MCNC: 10.4 G/DL (ref 12–17)
MCH RBC QN AUTO: 30.3 PG (ref 26.8–34.3)
MCHC RBC AUTO-ENTMCNC: 31.4 G/DL (ref 31.4–37.4)
MCV RBC AUTO: 97 FL (ref 82–98)
MRSA NOSE QL CULT: ABNORMAL
MRSA NOSE QL CULT: ABNORMAL
PLATELET # BLD AUTO: 184 THOUSANDS/UL (ref 149–390)
PMV BLD AUTO: 10.6 FL (ref 8.9–12.7)
POTASSIUM SERPL-SCNC: 5.5 MMOL/L (ref 3.5–5.3)
RBC # BLD AUTO: 3.43 MILLION/UL (ref 3.88–5.62)
SODIUM SERPL-SCNC: 135 MMOL/L (ref 135–147)
WBC # BLD AUTO: 5.54 THOUSAND/UL (ref 4.31–10.16)

## 2025-01-18 PROCEDURE — 85027 COMPLETE CBC AUTOMATED: CPT

## 2025-01-18 PROCEDURE — 90935 HEMODIALYSIS ONE EVALUATION: CPT | Performed by: INTERNAL MEDICINE

## 2025-01-18 PROCEDURE — 99239 HOSP IP/OBS DSCHRG MGMT >30: CPT

## 2025-01-18 PROCEDURE — 5A1D70Z PERFORMANCE OF URINARY FILTRATION, INTERMITTENT, LESS THAN 6 HOURS PER DAY: ICD-10-PCS | Performed by: INTERNAL MEDICINE

## 2025-01-18 PROCEDURE — 80048 BASIC METABOLIC PNL TOTAL CA: CPT

## 2025-01-18 RX ADMIN — SEVELAMER HYDROCHLORIDE 800 MG: 800 TABLET, FILM COATED ORAL at 11:40

## 2025-01-18 RX ADMIN — ATORVASTATIN CALCIUM 80 MG: 80 TABLET, FILM COATED ORAL at 10:09

## 2025-01-18 RX ADMIN — PANTOPRAZOLE SODIUM 40 MG: 40 TABLET, DELAYED RELEASE ORAL at 06:31

## 2025-01-18 RX ADMIN — ASPIRIN 81 MG CHEWABLE TABLET 81 MG: 81 TABLET CHEWABLE at 10:09

## 2025-01-18 RX ADMIN — ALLOPURINOL 100 MG: 100 TABLET ORAL at 10:09

## 2025-01-18 RX ADMIN — GABAPENTIN 200 MG: 100 CAPSULE ORAL at 10:09

## 2025-01-18 RX ADMIN — APIXABAN 5 MG: 5 TABLET, FILM COATED ORAL at 10:09

## 2025-01-18 RX ADMIN — SODIUM ZIRCONIUM CYCLOSILICATE 10 G: 10 POWDER, FOR SUSPENSION ORAL at 10:10

## 2025-01-18 NOTE — PROCEDURES
NEPHROLOGY HOSPITAL PROGRESS NOTE   Naresh Carpio 64 y.o. male MRN: [id number]  Unit/Bed#: [address] [place] [address] Encounter: [id number]  Reason for Consult: Dialysis management    Brief History of Admission - 64-year-old male with a history of ESRD who presents for flulike symptoms missed dialysis on Tuesday. Nephrology counts for hyperkalemia.     NEPHROLOGY DIALYSIS PROCEDURE NOTE      Patient seen and examined on hemodialysis, tolerating procedure well. All documentation, labs, medications were reviewed by myself, and the treatment plan was reviewed with nurse and patient.     Seen on Dialysis at : 10:15 AM  Dialysis Access: Right IJ tunneled PermCath  Vitals: 166/67 prerate 106 kg  Dialysis time: 4 hours  Dialyzer: F160  Sodium bath: 138  Potassium bath: 2 K+  Bicarbonate bath: 35  Calcium bath: 2.5  Ultrafiltration: 3 to 3.5 L  Blood flow rate: 400 cc/min  Dialysis flow rate: 1.5 X Qb  Dialysis temperature: 36C  Medications given on HD: none  Assessment & Plan  ESRD (end stage renal disease) on dialysis ([facility])  Lab Results   Component Value Date    EGFR 5 01/18/2025    EGFR 6 01/17/2025    EGFR 4 01/16/2025    CREATININE 9.36 (H) 01/18/2025    CREATININE 8.06 (H) 01/17/2025    CREATININE 11.10 (H) 01/16/2025   -- In center hemodialysis at [facility] Tuesday Thursday Saturday  --Last dialysis was on Saturday, January 11, missed dialysis on Tuesday, January 14  --Access: Right IJ permacath  --Dry weight 99 kg  -- Patient seen on hemodialysis today  --Stable for discharge after dialysis  --Potassium 5.5 this morning.  Dialyzed on a 2K bath.  Recommend low potassium diet upon discharge.  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  History of TIA (transient ischemic attack)    Paroxysmal atrial fibrillation (HCC)    Flu-like symptoms        I have reviewed the nephrology recommendations including assessment and plan, with patient and HD nurse, and we are in agreement with renal plan including the information outlined above.    SUBJECTIVE / 24H INTERVAL HISTORY:  Feeling better no issues seen on dialysis    OBJECTIVE:  Current Weight: Weight - Scale: 107 kg (235 lb 9.6 oz)  Vitals:    01/18/25 0830 01/18/25 0900 01/18/25 0930 01/18/25 1000   BP: 153/65 155/66 157/64 166/67   BP Location:       Pulse: 72 67 69 67   Resp: 16 16 16 16   Temp:       TempSrc:       SpO2:       Weight:           Intake/Output Summary (Last 24 hours) at 1/18/2025 1021  Last data filed at 1/18/2025 0730  Gross per 24 hour   Intake 200 ml   Output 325 ml   Net -125 ml     General: Awake, in acute distress  HEENT: Pink and moist mucous membranes  Neck: Supple  Skin: No rashes  Cardiovascular: S1-S2 appreciated regular rate and rhythm  Lungs: Clear  Abdomen: Nontender nondistended  Extremities: No edema  Neurologic: No asterixis  Access: No erythema or exudate the catheter    Medications:    Current Facility-Administered Medications:     acetaminophen (TYLENOL) tablet 650 mg, 650 mg, Oral, Once, GRANT Valdivia    acetaminophen (TYLENOL) tablet 650 mg, 650 mg, Oral, Q6H PRN, GRANT Valdivia    allopurinol (ZYLOPRIM) tablet 100 mg, 100 mg, Oral, Daily, Alisson Morris  "GRANT, 100 mg at 01/18/25 1009    apixaban (ELIQUIS) tablet 5 mg, 5 mg, Oral, BID, GRANT Valdivia, 5 mg at 01/18/25 1009    aspirin chewable tablet 81 mg, 81 mg, Oral, Daily, GRANT Valdivia, 81 mg at 01/18/25 1009    atorvastatin (LIPITOR) tablet 80 mg, 80 mg, Oral, Daily, GRANT Valdivia, 80 mg at 01/18/25 1009    gabapentin (NEURONTIN) capsule 200 mg, 200 mg, Oral, BID, GRANT Valdivia, 200 mg at 01/18/25 1009    NIFEdipine (PROCARDIA XL) 24 hr tablet 30 mg, 30 mg, Oral, After Dinner, GRANT Valdivia, 30 mg at 01/17/25 1725    ondansetron (ZOFRAN) injection 4 mg, 4 mg, Intravenous, Once, GRANT Valdivia    pantoprazole (PROTONIX) EC tablet 40 mg, 40 mg, Oral, Early Morning, GRANT Valdivia, 40 mg at 01/18/25 0631    polyethylene glycol (MIRALAX) packet 17 g, 17 g, Oral, Daily, GRANT Valdivia    sevelamer (RENAGEL) tablet 800 mg, 800 mg, Oral, TID With Meals, GRANT Valdivia, 800 mg at 01/17/25 1724    Sodium Zirconium Cyclosilicate (Lokelma) 10 g, 10 g, Oral, Daily, GRANT Valdivia, 10 g at 01/18/25 1010    Laboratory Results:  Results from last 7 days   Lab Units 01/18/25  0633 01/17/25  0506 01/16/25  1008   WBC Thousand/uL 5.54 6.39 6.74   HEMOGLOBIN g/dL 10.4* 10.5* 10.8*   HEMATOCRIT % 33.1* 33.1* 35.7*   PLATELETS Thousands/uL 184 195 201   POTASSIUM mmol/L 5.5* 4.7 7.3*   CHLORIDE mmol/L 98 96 101   CO2 mmol/L 22 24 18*   BUN mg/dL 73* 63* 106*   CREATININE mg/dL 9.36* 8.06* 11.10*   CALCIUM mg/dL 6.8* 7.3* 7.6*   MAGNESIUM mg/dL  --  2.2 2.7   PHOSPHORUS mg/dL  --  6.9* 9.1*       Portions of the record may have been created with voice recognition software. Occasional wrong word or \"sound a like\" substitutions may have occurred due to the inherent limitations of voice recognition software. Read the chart carefully and recognize, using context, where substitutions " have occurred.If you have any questions, please contact the dictating provider.

## 2025-01-18 NOTE — DISCHARGE SUMMARY
Discharge Summary - Hospitalist   Name: Naresh Carpio 64 y.o. male I MRN: [id number]  Unit/Bed#: [address] [place] [address] Date of Admission: 1/16/2025   Date of Service: 1/18/2025 I Hospital Day: 1     Assessment & Plan  Flu-like symptoms (Resolved: 1/18/2025)  Patient reports last week he experienced low grade fevers, body aches, headache, and diarrhea which caused him to miss dialysis on Tuesday.  Flu/COVID/RSV negative  Denies current symptoms  Patient afebrile, no leukocytosis  No further diarrhea since admission  ESRD (end stage renal disease) on dialysis ([facility])  Lab Results   Component Value Date    EGFR 5 01/18/2025    EGFR 6 01/17/2025    EGFR 4 01/16/2025    CREATININE 9.36 (H) 01/18/2025    CREATININE 8.06 (H) 01/17/2025    CREATININE 11.10 (H) 01/16/2025   Dialysis TTS  Missed Tuesday due to dialysis, presented with hyperkalemia  Underwent hemodialysis 1/16 ultrafiltrated with 4.5 L and again on 1/18  Recommended low potassium diet on discharge  Cleared by nephrology for discharge  Primary hypertension  Continue nifedipine 30 mg daily  BP initially elevated on arrival, likely in setting of volume overload  Now improved  Anemia in ESRD (end-stage renal disease)  (HCC)  Hgb stable at baseline  History of TIA (transient ischemic attack)  Hx of TIA  Continue Eliquis, aspirin, lipitor  Paroxysmal atrial fibrillation (HCC)  Continue Eliquis 5 mg bid     Medical Problems       Resolved Problems  Date Reviewed: 1/18/2025          Resolved    Bradycardia 1/17/2025     Resolved by  Hedy Bazan MD    Hyperkalemia 1/17/2025     Resolved by  Hedy Bazan MD    * (Principal) Flu-like symptoms 1/18/2025     Resolved by  Nallely Pisano PA-C        Discharging Physician / Practitioner: Nallely Pisano PA-C  PCP: Jeffrey Jackson  Admission Date:   Admission Orders (From admission, onward)       Ordered        01/17/25 1307  INPATIENT ADMISSION  Once            01/16/25 1144  Place in Observation  Once                          Discharge  Date: 01/18/25    Consultations During Hospital Stay:  Nephrology    Procedures Performed:   none    Significant Findings / Test Results:   none    Incidental Findings:   none     Test Results Pending at Discharge (will require follow up):   none     Outpatient Tests Requested:  none    Complications:  none    Reason for Admission: missed dialysis, hyperkalemia    Hospital Course:   Naresh Carpio is a 64 y.o. male patient who originally presented to the hospital on 1/16/2025 due to generalized weakness, nausea, and diarrhea causing him to miss dialysis prior to admission.  In the ED he was noted to be hyperkalemic with potassium of 7.3.  Nephrology was consulted and he underwent urgent dialysis and ultrafiltrated with 4.5 L.  Patient's flulike symptoms have resolved and he had had no further diarrhea since admission.  He remained afebrile, hemodynamically stable, and without leukocytosis.  Patient clinically improved and cleared by nephrology prior to discharge.    Please see above list of diagnoses and related plan for additional information.     Condition at Discharge: fair    Discharge Day Visit / Exam:   Subjective:  Patient reports feeling well today and that all his previous flu like symptoms have resolved.  Denies dizziness, chest pain, cough, shortness of breath, vomiting, abdominal pain, or further diarrhea.  Reports he is good appetite.  Agreeable to discharge today after dialysis.  Vitals: Blood Pressure: 165/66 (01/18/25 1030)  Pulse: 65 (01/18/25 1030)  Temperature: 97.5 °F (36.4 °C) (01/18/25 0725)  Temp Source: Tympanic (01/18/25 0725)  Respirations: 16 (01/18/25 1030)  Weight - Scale: 107 kg (235 lb 9.6 oz) (01/18/25 0600)  SpO2: 92 % (01/18/25 0100)  Physical Exam  Vitals and nursing note reviewed.   Constitutional:       General: He is not in acute distress.     Appearance: He is well-developed.   Cardiovascular:      Rate and Rhythm: Normal rate and regular rhythm.   Pulmonary:      Effort:  Pulmonary effort is normal. No respiratory distress.      Breath sounds: Normal breath sounds.   Abdominal:      Palpations: Abdomen is soft.      Tenderness: There is no abdominal tenderness.   Musculoskeletal:         General: No swelling.   Skin:     General: Skin is warm and dry.      Capillary Refill: Capillary refill takes less than 2 seconds.   Neurological:      Mental Status: He is alert.   Psychiatric:         Mood and Affect: Mood normal.          Discussion with Family: Patient declined call to .     Discharge instructions/Information to patient and family:   See after visit summary for information provided to patient and family.      Provisions for Follow-Up Care:  See after visit summary for information related to follow-up care and any pertinent home health orders.      Mobility at time of Discharge:   Basic Mobility Inpatient Raw Score: 16  JH-HLM Goal: 5: Stand one or more mins  JH-HLM Achieved: 6: Walk 10 steps or more  HLM Goal achieved. Continue to encourage appropriate mobility.     Disposition:   Home    Planned Readmission: none    Discharge Medications:  See after visit summary for reconciled discharge medications provided to patient and/or family.      Administrative Statements   Discharge Statement:  I have spent a total time of 45 minutes in caring for this patient on the day of the visit/encounter. >30 minutes of time was spent on: Diagnostic results, Risks and benefits of tx options, Instructions for management, Patient and family education, Importance of tx compliance, Risk factor reductions, Counseling / Coordination of care, Documenting in the medical record, Reviewing / ordering tests, medicine, procedures  , and Communicating with other healthcare professionals .    **Please Note: This note may have been constructed using a voice recognition system**

## 2025-01-18 NOTE — PLAN OF CARE
Pt on HD treatment with a UF goal of 3.5-4.5 L as tolerated. Pt on a 2 k 2.5 basim bath for a potassium of 5.5 on 01/18/25.  Problem: METABOLIC, FLUID AND ELECTROLYTES - ADULT  Goal: Electrolytes maintained within normal limits  Description: INTERVENTIONS:  - Monitor labs and assess patient for signs and symptoms of electrolyte imbalances  - Administer electrolyte replacement as ordered  - Monitor response to electrolyte replacements, including repeat lab results as appropriate  - Instruct patient on fluid and nutrition as appropriate  Outcome: Progressing  Goal: Fluid balance maintained  Description: INTERVENTIONS:  - Monitor labs   - Monitor I/O and WT  - Instruct patient on fluid and nutrition as appropriate  - Assess for signs & symptoms of volume excess or deficit  Outcome: Progressing

## 2025-01-18 NOTE — ASSESSMENT & PLAN NOTE
Lab Results   Component Value Date    EGFR 5 01/18/2025    EGFR 6 01/17/2025    EGFR 4 01/16/2025    CREATININE 9.36 (H) 01/18/2025    CREATININE 8.06 (H) 01/17/2025    CREATININE 11.10 (H) 01/16/2025   Dialysis TTS  Missed Tuesday due to dialysis, presented with hyperkalemia  Underwent hemodialysis 1/16 ultrafiltrated with 4.5 L and again on 1/18  Recommended low potassium diet on discharge  Cleared by nephrology for discharge

## 2025-01-18 NOTE — ASSESSMENT & PLAN NOTE
Lab Results   Component Value Date    EGFR 5 01/18/2025    EGFR 6 01/17/2025    EGFR 4 01/16/2025    CREATININE 9.36 (H) 01/18/2025    CREATININE 8.06 (H) 01/17/2025    CREATININE 11.10 (H) 01/16/2025   -- In center hemodialysis at [facility] Tuesday Thursday Saturday  --Last dialysis was on Saturday, January 11, missed dialysis on Tuesday, January 14  --Access: Right IJ permacath  --Dry weight 99 kg  -- Patient seen on hemodialysis today  --Stable for discharge after dialysis  --Potassium 5.5 this morning.  Dialyzed on a 2K bath.  Recommend low potassium diet upon discharge.

## 2025-01-18 NOTE — ASSESSMENT & PLAN NOTE
Patient reports last week he experienced low grade fevers, body aches, headache, and diarrhea which caused him to miss dialysis on Tuesday.  Flu/COVID/RSV negative  Denies current symptoms  Patient afebrile, no leukocytosis  No further diarrhea since admission

## 2025-01-18 NOTE — NURSING NOTE
AVS reviewed with patient including follow up appointments, discharge instructions and medications. Low potassium diet reviewed. Patient states understanding. Discharged to home with belongings.

## 2025-01-18 NOTE — DISCHARGE INSTR - AVS FIRST PAGE
Please adhere to a low potassium diet.  Continue outpatient follow up with your primary care provider and nephrologist.

## 2025-01-18 NOTE — ASSESSMENT & PLAN NOTE
Lab Results   Component Value Date    EGFR 5 01/18/2025    EGFR 6 01/17/2025    EGFR 4 01/16/2025    CREATININE 9.36 (H) 01/18/2025    CREATININE 8.06 (H) 01/17/2025    CREATININE 11.10 (H) 01/16/2025   -- Hemoglobin at target at 10.4

## 2025-01-18 NOTE — HEMODIALYSIS
Post-Dialysis RN Treatment Note    Blood Pressure:  Pre 144/63 mm/Hg  Post 164/63 mmHg   EDW:  99 kg    Weight:  Pre 105.9 kg   Post 101.4 kg   Mode of weight measurement: Standing Scale   Volume Removed:  4500 ml    Treatment duration: 210 minutes    NS given:  No    Treatment shortened No   Medications given during Rx: None Reported   Estimated Kt/V:  None Reported   Access type: Permacath/TDC   Needle Gauge:  N/A   Access Issues: No    Report called to primary nurse:   Yes Merary DIMAS RN

## 2025-01-18 NOTE — PLAN OF CARE
Problem: METABOLIC, FLUID AND ELECTROLYTES - ADULT  Goal: Electrolytes maintained within normal limits  Description: INTERVENTIONS:  - Monitor labs and assess patient for signs and symptoms of electrolyte imbalances  - Administer electrolyte replacement as ordered  - Monitor response to electrolyte replacements, including repeat lab results as appropriate  - Instruct patient on fluid and nutrition as appropriate  Outcome: Progressing     Problem: Knowledge Deficit  Goal: Patient/family/caregiver demonstrates understanding of disease process, treatment plan, medications, and discharge instructions  Description: Complete learning assessment and assess knowledge base.  Interventions:  - Provide teaching at level of understanding  - Provide teaching via preferred learning methods  Outcome: Progressing

## 2025-01-18 NOTE — ASSESSMENT & PLAN NOTE
Lab Results   Component Value Date    EGFR 5 01/18/2025    EGFR 6 01/17/2025    EGFR 4 01/16/2025    CREATININE 9.36 (H) 01/18/2025    CREATININE 8.06 (H) 01/17/2025    CREATININE 11.10 (H) 01/16/2025   -- Restart home medications

## 2025-01-20 LAB
ATRIAL RATE: 35 BPM
P AXIS: 27 DEGREES
PR INTERVAL: 216 MS
QRS AXIS: -6 DEGREES
QRSD INTERVAL: 90 MS
QT INTERVAL: 622 MS
QTC INTERVAL: 474 MS
T WAVE AXIS: 85 DEGREES
VENTRICULAR RATE: 35 BPM

## 2025-01-20 PROCEDURE — 93010 ELECTROCARDIOGRAM REPORT: CPT | Performed by: INTERNAL MEDICINE

## 2025-02-08 ENCOUNTER — HOSPITAL ENCOUNTER (INPATIENT)
Facility: HOSPITAL | Age: 65
LOS: 2 days | Discharge: HOME | DRG: 640 | End: 2025-02-11
Attending: EMERGENCY MEDICINE | Admitting: INTERNAL MEDICINE
Payer: MEDICARE

## 2025-02-08 ENCOUNTER — APPOINTMENT (OUTPATIENT)
Dept: DIALYSIS | Facility: HOSPITAL | Age: 65
DRG: 640 | End: 2025-02-08
Payer: MEDICARE

## 2025-02-08 ENCOUNTER — APPOINTMENT (EMERGENCY)
Dept: RADIOLOGY | Facility: HOSPITAL | Age: 65
DRG: 640 | End: 2025-02-08
Payer: MEDICARE

## 2025-02-08 DIAGNOSIS — I48.0 PAROXYSMAL ATRIAL FIBRILLATION (HCC): ICD-10-CM

## 2025-02-08 DIAGNOSIS — E87.5 HYPERKALEMIA: Primary | ICD-10-CM

## 2025-02-08 DIAGNOSIS — N18.6 ESRD (END STAGE RENAL DISEASE) (HCC): ICD-10-CM

## 2025-02-08 DIAGNOSIS — M25.512 LEFT SHOULDER PAIN, UNSPECIFIED CHRONICITY: ICD-10-CM
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
## 2025-02-08 DIAGNOSIS — E87.70 FLUID OVERLOAD: ICD-10-CM

## 2025-02-08 DIAGNOSIS — I10 HYPERTENSION: ICD-10-CM

## 2025-02-08 PROBLEM — R07.89 CHEST TIGHTNESS: Status: ACTIVE | Noted: 2025-02-08

## 2025-02-08 LAB
2HR DELTA HS TROPONIN: -1 NG/L
4HR DELTA HS TROPONIN: 1 NG/L
ALBUMIN SERPL BCG-MCNC: 4.2 G/DL (ref 3.5–5)
ALP SERPL-CCNC: 156 U/L (ref 34–104)
ALT SERPL W P-5'-P-CCNC: 69 U/L (ref 7–52)
ANION GAP SERPL CALCULATED.3IONS-SCNC: 16 MMOL/L (ref 4–13)
AST SERPL W P-5'-P-CCNC: 90 U/L (ref 13–39)
BACTERIA UR QL AUTO: ABNORMAL /HPF
BASOPHILS # BLD AUTO: 0.03 THOUSANDS/ÂΜL (ref 0–0.1)
BASOPHILS NFR BLD AUTO: 1 % (ref 0–1)
BILIRUB SERPL-MCNC: 0.29 MG/DL (ref 0.2–1)
BILIRUB UR QL STRIP: NEGATIVE
BUN SERPL-MCNC: 89 MG/DL (ref 5–25)
CALCIUM SERPL-MCNC: 8.6 MG/DL (ref 8.4–10.2)
CARDIAC TROPONIN I PNL SERPL HS: 31 NG/L (ref ?–50)
CARDIAC TROPONIN I PNL SERPL HS: 32 NG/L (ref ?–50)
CARDIAC TROPONIN I PNL SERPL HS: 33 NG/L (ref ?–50)
CHLORIDE SERPL-SCNC: 101 MMOL/L (ref 96–108)
CK SERPL-CCNC: 43 U/L (ref 39–308)
CLARITY UR: CLEAR
CO2 SERPL-SCNC: 21 MMOL/L (ref 21–32)
COLOR UR: ABNORMAL
CREAT SERPL-MCNC: 9.78 MG/DL (ref 0.6–1.3)
EOSINOPHIL # BLD AUTO: 0.43 THOUSAND/ÂΜL (ref 0–0.61)
EOSINOPHIL NFR BLD AUTO: 8 % (ref 0–6)
ERYTHROCYTE [DISTWIDTH] IN BLOOD BY AUTOMATED COUNT: 15.6 % (ref 11.6–15.1)
GFR SERPL CREATININE-BSD FRML MDRD: 4 ML/MIN/1.73SQ M
GLUCOSE SERPL-MCNC: 107 MG/DL (ref 65–140)
GLUCOSE SERPL-MCNC: 115 MG/DL (ref 65–140)
GLUCOSE SERPL-MCNC: 128 MG/DL (ref 65–140)
GLUCOSE SERPL-MCNC: 87 MG/DL (ref 65–140)
GLUCOSE UR STRIP-MCNC: ABNORMAL MG/DL
HCT VFR BLD AUTO: 35.6 % (ref 36.5–49.3)
HGB BLD-MCNC: 10.9 G/DL (ref 12–17)
HGB UR QL STRIP.AUTO: ABNORMAL
IMM GRANULOCYTES # BLD AUTO: 0.03 THOUSAND/UL (ref 0–0.2)
IMM GRANULOCYTES NFR BLD AUTO: 1 % (ref 0–2)
KETONES UR STRIP-MCNC: NEGATIVE MG/DL
LEUKOCYTE ESTERASE UR QL STRIP: ABNORMAL
LYMPHOCYTES # BLD AUTO: 0.92 THOUSANDS/ÂΜL (ref 0.6–4.47)
LYMPHOCYTES NFR BLD AUTO: 17 % (ref 14–44)
MCH RBC QN AUTO: 29.2 PG (ref 26.8–34.3)
MCHC RBC AUTO-ENTMCNC: 30.6 G/DL (ref 31.4–37.4)
MCV RBC AUTO: 95 FL (ref 82–98)
MONOCYTES # BLD AUTO: 0.6 THOUSAND/ÂΜL (ref 0.17–1.22)
MONOCYTES NFR BLD AUTO: 11 % (ref 4–12)
NEUTROPHILS # BLD AUTO: 3.53 THOUSANDS/ÂΜL (ref 1.85–7.62)
NEUTS SEG NFR BLD AUTO: 62 % (ref 43–75)
NITRITE UR QL STRIP: NEGATIVE
NON-SQ EPI CELLS URNS QL MICRO: ABNORMAL /HPF
NRBC BLD AUTO-RTO: 0 /100 WBCS
PH UR STRIP.AUTO: 7 [PH]
PLATELET # BLD AUTO: 189 THOUSANDS/UL (ref 149–390)
PMV BLD AUTO: 10.1 FL (ref 8.9–12.7)
POTASSIUM SERPL-SCNC: 5.1 MMOL/L (ref 3.5–5.3)
POTASSIUM SERPL-SCNC: 6 MMOL/L (ref 3.5–5.3)
POTASSIUM SERPL-SCNC: 6.3 MMOL/L (ref 3.5–5.3)
PROT SERPL-MCNC: 7.4 G/DL (ref 6.4–8.4)
PROT UR STRIP-MCNC: ABNORMAL MG/DL
RBC # BLD AUTO: 3.73 MILLION/UL (ref 3.88–5.62)
RBC #/AREA URNS AUTO: ABNORMAL /HPF
SODIUM SERPL-SCNC: 138 MMOL/L (ref 135–147)
SP GR UR STRIP.AUTO: 1.02 (ref 1–1.03)
UROBILINOGEN UR STRIP-ACNC: <2 MG/DL
WBC # BLD AUTO: 5.54 THOUSAND/UL (ref 4.31–10.16)
WBC #/AREA URNS AUTO: ABNORMAL /HPF

## 2025-02-08 PROCEDURE — 93005 ELECTROCARDIOGRAM TRACING: CPT

## 2025-02-08 PROCEDURE — 81001 URINALYSIS AUTO W/SCOPE: CPT | Performed by: INTERNAL MEDICINE

## 2025-02-08 PROCEDURE — 99215 OFFICE O/P EST HI 40 MIN: CPT | Performed by: INTERNAL MEDICINE

## 2025-02-08 PROCEDURE — 73030 X-RAY EXAM OF SHOULDER: CPT

## 2025-02-08 PROCEDURE — 84484 ASSAY OF TROPONIN QUANT: CPT | Performed by: EMERGENCY MEDICINE

## 2025-02-08 PROCEDURE — 82948 REAGENT STRIP/BLOOD GLUCOSE: CPT

## 2025-02-08 PROCEDURE — 84132 ASSAY OF SERUM POTASSIUM: CPT | Performed by: INTERNAL MEDICINE

## 2025-02-08 PROCEDURE — 85025 COMPLETE CBC W/AUTO DIFF WBC: CPT | Performed by: EMERGENCY MEDICINE

## 2025-02-08 PROCEDURE — 80053 COMPREHEN METABOLIC PANEL: CPT | Performed by: EMERGENCY MEDICINE

## 2025-02-08 PROCEDURE — 94640 AIRWAY INHALATION TREATMENT: CPT

## 2025-02-08 PROCEDURE — 71045 X-RAY EXAM CHEST 1 VIEW: CPT

## 2025-02-08 PROCEDURE — 82550 ASSAY OF CK (CPK): CPT | Performed by: INTERNAL MEDICINE

## 2025-02-08 PROCEDURE — 99285 EMERGENCY DEPT VISIT HI MDM: CPT

## 2025-02-08 PROCEDURE — 99223 1ST HOSP IP/OBS HIGH 75: CPT | Performed by: INTERNAL MEDICINE

## 2025-02-08 PROCEDURE — 99291 CRITICAL CARE FIRST HOUR: CPT | Performed by: EMERGENCY MEDICINE

## 2025-02-08 PROCEDURE — 36415 COLL VENOUS BLD VENIPUNCTURE: CPT | Performed by: EMERGENCY MEDICINE

## 2025-02-08 PROCEDURE — 5A1D70Z PERFORMANCE OF URINARY FILTRATION, INTERMITTENT, LESS THAN 6 HOURS PER DAY: ICD-10-PCS | Performed by: INTERNAL MEDICINE

## 2025-02-08 RX ORDER — GABAPENTIN 100 MG/1
200 CAPSULE ORAL
Status: DISCONTINUED | OUTPATIENT
Start: 2025-02-08 | End: 2025-02-09

## 2025-02-08 RX ORDER — ASPIRIN 81 MG/1
81 TABLET, CHEWABLE ORAL DAILY
Status: DISCONTINUED | OUTPATIENT
Start: 2025-02-09 | End: 2025-02-11 | Stop reason: HOSPADM

## 2025-02-08 RX ORDER — NIFEDIPINE 30 MG/1
30 TABLET, EXTENDED RELEASE ORAL
Status: DISCONTINUED | OUTPATIENT
Start: 2025-02-08 | End: 2025-02-11 | Stop reason: HOSPADM

## 2025-02-08 RX ORDER — SEVELAMER HYDROCHLORIDE 800 MG/1
800 TABLET, FILM COATED ORAL
Status: DISCONTINUED | OUTPATIENT
Start: 2025-02-08 | End: 2025-02-11 | Stop reason: HOSPADM

## 2025-02-08 RX ORDER — CALCIUM GLUCONATE 20 MG/ML
1 INJECTION, SOLUTION INTRAVENOUS ONCE
Status: COMPLETED | OUTPATIENT
Start: 2025-02-08 | End: 2025-02-08

## 2025-02-08 RX ORDER — ATORVASTATIN CALCIUM 80 MG/1
80 TABLET, FILM COATED ORAL DAILY
Status: DISCONTINUED | OUTPATIENT
Start: 2025-02-09 | End: 2025-02-11 | Stop reason: HOSPADM

## 2025-02-08 RX ORDER — ALLOPURINOL 100 MG/1
100 TABLET ORAL DAILY
Status: DISCONTINUED | OUTPATIENT
Start: 2025-02-09 | End: 2025-02-11 | Stop reason: HOSPADM

## 2025-02-08 RX ORDER — AMLODIPINE BESYLATE 5 MG/1
10 TABLET ORAL ONCE
Status: COMPLETED | OUTPATIENT
Start: 2025-02-08 | End: 2025-02-08

## 2025-02-08 RX ORDER — ALBUTEROL SULFATE 0.83 MG/ML
2.5 SOLUTION RESPIRATORY (INHALATION) ONCE
Status: COMPLETED | OUTPATIENT
Start: 2025-02-08 | End: 2025-02-08

## 2025-02-08 RX ORDER — NIFEDIPINE 30 MG/1
30 TABLET, EXTENDED RELEASE ORAL
Status: DISCONTINUED | OUTPATIENT
Start: 2025-02-08 | End: 2025-02-08

## 2025-02-08 RX ORDER — LIDOCAINE 50 MG/G
1 PATCH TOPICAL DAILY
Status: DISCONTINUED | OUTPATIENT
Start: 2025-02-08 | End: 2025-02-11 | Stop reason: HOSPADM

## 2025-02-08 RX ORDER — DEXTROSE MONOHYDRATE 25 G/50ML
50 INJECTION, SOLUTION INTRAVENOUS ONCE
Status: COMPLETED | OUTPATIENT
Start: 2025-02-08 | End: 2025-02-08

## 2025-02-08 RX ORDER — GABAPENTIN 100 MG/1
200 CAPSULE ORAL
Status: DISCONTINUED | OUTPATIENT
Start: 2025-02-09 | End: 2025-02-08

## 2025-02-08 RX ORDER — ACETAMINOPHEN 325 MG/1
650 TABLET ORAL EVERY 6 HOURS PRN
Status: DISCONTINUED | OUTPATIENT
Start: 2025-02-08 | End: 2025-02-10

## 2025-02-08 RX ORDER — INSULIN LISPRO 100 [IU]/ML
1-5 INJECTION, SOLUTION INTRAVENOUS; SUBCUTANEOUS
Status: DISCONTINUED | OUTPATIENT
Start: 2025-02-08 | End: 2025-02-11 | Stop reason: HOSPADM

## 2025-02-08 RX ADMIN — DEXTROSE MONOHYDRATE 50 ML: 25 INJECTION, SOLUTION INTRAVENOUS at 12:13

## 2025-02-08 RX ADMIN — ACETAMINOPHEN 650 MG: 325 TABLET ORAL at 20:52

## 2025-02-08 RX ADMIN — ACETAMINOPHEN 650 MG: 325 TABLET ORAL at 14:38

## 2025-02-08 RX ADMIN — GABAPENTIN 200 MG: 100 CAPSULE ORAL at 21:50

## 2025-02-08 RX ADMIN — APIXABAN 5 MG: 5 TABLET, FILM COATED ORAL at 19:39

## 2025-02-08 RX ADMIN — CALCIUM GLUCONATE 1 G: 20 INJECTION, SOLUTION INTRAVENOUS at 10:19

## 2025-02-08 RX ADMIN — INSULIN HUMAN 10 UNITS: 100 INJECTION, SOLUTION PARENTERAL at 12:15

## 2025-02-08 RX ADMIN — ALBUTEROL SULFATE 2.5 MG: 2.5 SOLUTION RESPIRATORY (INHALATION) at 12:17

## 2025-02-08 RX ADMIN — AMLODIPINE BESYLATE 10 MG: 5 TABLET ORAL at 10:35

## 2025-02-08 RX ADMIN — SEVELAMER HYDROCHLORIDE 800 MG: 800 TABLET, FILM COATED ORAL at 17:41

## 2025-02-08 RX ADMIN — NIFEDIPINE 30 MG: 30 TABLET, EXTENDED RELEASE ORAL at 19:39

## 2025-02-08 RX ADMIN — SODIUM ZIRCONIUM CYCLOSILICATE 10 G: 10 POWDER, FOR SUSPENSION ORAL at 12:19

## 2025-02-08 RX ADMIN — LIDOCAINE 5% 1 PATCH: 700 PATCH TOPICAL at 19:39

## 2025-02-08 NOTE — ASSESSMENT & PLAN NOTE
EKG-sinus bradycardia/junctional bradycardia,?  Sick sinus syndrome  Patient denies dizziness or syncope  On Eliquis but not been taking recently due to cost  Resume Eliquis  Telemetry

## 2025-02-08 NOTE — ASSESSMENT & PLAN NOTE
Lab Results   Component Value Date    HGBA1C 5.5 10/08/2024       Recent Labs     02/08/25  1213   POCGLU 107       Blood Sugar Average: Last 72 hrs:  (P) 107  Not currently on any medication, reports good glycemic control  Diabetic diet  Will monitor blood glucose trend  Insulin sliding scale  Resume home gabapentin, change to once a day

## 2025-02-08 NOTE — ASSESSMENT & PLAN NOTE
- BP elevated, likely due to volume overload  - UF as able with dialysis   - restart home nifeidpine

## 2025-02-08 NOTE — ASSESSMENT & PLAN NOTE
- challenge EDW with HD  - patient significantly above EDW with LE edema and subjective SOB  - patient able to tolerate 4L removal with outpatient dialysis Tuesday

## 2025-02-08 NOTE — ASSESSMENT & PLAN NOTE
Reported shortness of breath and chest tightness, likely secondary to volume overload  Mild rales on exam  EKG without any ischemic changes.  Troponin 32-31-33.  Saturating adequately on room air  Denies any chest pain  Monitor symptoms with fluid removal

## 2025-02-08 NOTE — PLAN OF CARE
Patient presents for a 3 hour emergent HD session on a 2K2.5Ca bath for two hours and a 1K2.5Ca bath for the last hour due to a serum potassium of 6.3 mmol/L drawn today with a net UF goal of 3.5-4 L as tolerated.    Post-Dialysis RN Treatment Note    Blood Pressure:  Pre 157/68 mm/Hg  Post 158/61 mmHg   EDW:  99.0 kg    Weight:  Pre 107 kg   Post 104 kg   Mode of weight measurement: Standing Scale   Volume Removed:  300 ml    Treatment duration: 180 minutes    NS given:  No    Treatment shortened No   Medications given during Rx: None Reported   Estimated Kt/V:  Not Applicable   Access type: Permacath/TDC   Access Issues: No    Report called to primary nurse:   Yes,  Anahy Amos RN     Problem: METABOLIC, FLUID AND ELECTROLYTES - ADULT  Goal: Electrolytes maintained within normal limits  Description: INTERVENTIONS:  - Monitor labs and assess patient for signs and symptoms of electrolyte imbalances  - Administer electrolyte replacement as ordered  - Monitor response to electrolyte replacements, including repeat lab results as appropriate  - Instruct patient on fluid and nutrition as appropriate  Outcome: Progressing  Goal: Fluid balance maintained  Description: INTERVENTIONS:  - Monitor labs   - Monitor I/O and WT  - Instruct patient on fluid and nutrition as appropriate  - Assess for signs & symptoms of volume excess or deficit  Outcome: Progressing

## 2025-02-08 NOTE — PLAN OF CARE
Problem: Potential for Falls  Goal: Patient will remain free of falls  Description: INTERVENTIONS:  - Educate patient/family on patient safety including physical limitations  - Instruct patient to call for assistance with activity   - Consult OT/PT to assist with strengthening/mobility   - Keep Call bell within reach  - Keep bed low and locked with side rails adjusted as appropriate  - Keep care items and personal belongings within reach  - Initiate and maintain comfort rounds  - Make Fall Risk Sign visible to staff  - Offer Toileting every 2 Hours, in advance of need  - Initiate/Maintain bed alarm  - Obtain necessary fall risk management equipment: fall risk sign on door  - Apply yellow socks and bracelet for high fall risk patients  - Consider moving patient to room near nurses station  Outcome: Progressing     Problem: PAIN - ADULT  Goal: Verbalizes/displays adequate comfort level or baseline comfort level  Description: Interventions:  - Encourage patient to monitor pain and request assistance  - Assess pain using appropriate pain scale  - Administer analgesics based on type and severity of pain and evaluate response  - Implement non-pharmacological measures as appropriate and evaluate response  - Consider cultural and social influences on pain and pain management  - Notify physician/advanced practitioner if interventions unsuccessful or patient reports new pain  Outcome: Progressing     Problem: INFECTION - ADULT  Goal: Absence or prevention of progression during hospitalization  Description: INTERVENTIONS:  - Assess and monitor for signs and symptoms of infection  - Monitor lab/diagnostic results  - Monitor all insertion sites, i.e. indwelling lines, tubes, and drains  - Monitor endotracheal if appropriate and nasal secretions for changes in amount and color  - [place] appropriate cooling/warming therapies per order  - Administer medications as ordered  - Instruct and encourage patient and family to use  good hand hygiene technique  - Identify and instruct in appropriate isolation precautions for identified infection/condition  Outcome: Progressing  Goal: Absence of fever/infection during neutropenic period  Description: INTERVENTIONS:  - Monitor WBC    Outcome: Progressing     Problem: SAFETY ADULT  Goal: Patient will remain free of falls  Description: INTERVENTIONS:  - Educate patient/family on patient safety including physical limitations  - Instruct patient to call for assistance with activity   - Consult OT/PT to assist with strengthening/mobility   - Keep Call bell within reach  - Keep bed low and locked with side rails adjusted as appropriate  - Keep care items and personal belongings within reach  - Initiate and maintain comfort rounds  - Make Fall Risk Sign visible to staff  - Offer Toileting every 2 Hours, in advance of need  - Initiate/Maintain bed alarm  - Obtain necessary fall risk management equipment: fall risk sign on door  - Apply yellow socks and bracelet for high fall risk patients  - Consider moving patient to room near nurses station  Outcome: Progressing  Goal: Maintain or return to baseline ADL function  Description: INTERVENTIONS:  -  Assess patient's ability to carry out ADLs; assess patient's baseline for ADL function and identify physical deficits which impact ability to perform ADLs (bathing, care of mouth/teeth, toileting, grooming, dressing, etc.)  - Assess/evaluate cause of self-care deficits   - Assess range of motion  - Assess patient's mobility; develop plan if impaired  - Assess patient's need for assistive devices and provide as appropriate  - Encourage maximum independence but intervene and supervise when necessary  - Involve family in performance of ADLs  - Assess for home care needs following discharge   - Consider OT consult to assist with ADL evaluation and planning for discharge  - Provide patient education as appropriate  Outcome: Progressing  Goal: Maintains/Returns to pre  admission functional level  Description: INTERVENTIONS:  - Perform AM-PAC 6 Click Basic Mobility/ Daily Activity assessment daily.  - Set and communicate daily mobility goal to care team and patient/family/caregiver.   - Collaborate with rehabilitation services on mobility goals if consulted  - Perform Range of Motion 2 times a day.  - Reposition patient every 2 hours.  - Dangle patient 2 times a day  - Stand patient 2 times a day  - Ambulate patient 3 times a day  - Out of bed to chair 3 times a day   - Out of bed for meals 3 times a day  - Out of bed for toileting  - Record patient progress and toleration of activity level   Outcome: Progressing     Problem: DISCHARGE PLANNING  Goal: Discharge to home or other facility with appropriate resources  Description: INTERVENTIONS:  - Identify barriers to discharge w/patient and caregiver  - Arrange for needed discharge resources and transportation as appropriate  - Identify discharge learning needs (meds, wound care, etc.)  - Arrange for interpretive services to assist at discharge as needed  - Refer to Case Management Department for coordinating discharge planning if the patient needs post-hospital services based on physician/advanced practitioner order or complex needs related to functional status, cognitive ability, or social support system  Outcome: Progressing     Problem: Knowledge Deficit  Goal: Patient/family/caregiver demonstrates understanding of disease process, treatment plan, medications, and discharge instructions  Description: Complete learning assessment and assess knowledge base.  Interventions:  - Provide teaching at level of understanding  - Provide teaching via preferred learning methods  Outcome: Progressing     Problem: CARDIOVASCULAR - ADULT  Goal: Maintains optimal cardiac output and hemodynamic stability  Description: INTERVENTIONS:  - Monitor I/O, vital signs and rhythm  - Monitor for S/S and trends of decreased cardiac output  - Administer and  titrate ordered vasoactive medications to optimize hemodynamic stability  - Assess quality of pulses, skin color and temperature  - Assess for signs of decreased coronary artery perfusion  - Instruct patient to report change in severity of symptoms  Outcome: Progressing  Goal: Absence of cardiac dysrhythmias or at baseline rhythm  Description: INTERVENTIONS:  - Continuous cardiac monitoring, vital signs, obtain 12 lead EKG if ordered  - Administer antiarrhythmic and heart rate control medications as ordered  - Monitor electrolytes and administer replacement therapy as ordered  Outcome: Progressing     Problem: RESPIRATORY - ADULT  Goal: Achieves optimal ventilation and oxygenation  Description: INTERVENTIONS:  - Assess for changes in respiratory status  - Assess for changes in mentation and behavior  - Position to facilitate oxygenation and minimize respiratory effort  - Oxygen administered by appropriate delivery if ordered  - Initiate smoking cessation education as indicated  - Encourage broncho-pulmonary hygiene including cough, deep breathe, Incentive Spirometry  - Assess the need for suctioning and aspirate as needed  - Assess and instruct to report SOB or any respiratory difficulty  - Respiratory Therapy support as indicated  Outcome: Progressing     Problem: GENITOURINARY - ADULT  Goal: Maintains or returns to baseline urinary function  Description: INTERVENTIONS:  - Assess urinary function  - Encourage oral fluids to ensure adequate hydration if ordered  - Administer IV fluids as ordered to ensure adequate hydration  - Administer ordered medications as needed  - Offer frequent toileting  - Follow urinary retention protocol if ordered  Outcome: Progressing     Problem: METABOLIC, FLUID AND ELECTROLYTES - ADULT  Goal: Electrolytes maintained within normal limits  Description: INTERVENTIONS:  - Monitor labs and assess patient for signs and symptoms of electrolyte imbalances  - Administer electrolyte replacement  as ordered  - Monitor response to electrolyte replacements, including repeat lab results as appropriate  - Instruct patient on fluid and nutrition as appropriate  Outcome: Progressing  Goal: Fluid balance maintained  Description: INTERVENTIONS:  - Monitor labs   - Monitor I/O and WT  - Instruct patient on fluid and nutrition as appropriate  - Assess for signs & symptoms of volume excess or deficit  Outcome: Progressing  Goal: Glucose maintained within target range  Description: INTERVENTIONS:  - Monitor Blood Glucose as ordered  - Assess for signs and symptoms of hyperglycemia and hypoglycemia  - Administer ordered medications to maintain glucose within target range  - Assess nutritional intake and initiate nutrition service referral as needed  Outcome: Progressing     Problem: HEMATOLOGIC - ADULT  Goal: Maintains hematologic stability  Description: INTERVENTIONS  - Assess for signs and symptoms of bleeding or hemorrhage  - Monitor labs  - Administer supportive blood products/factors as ordered and appropriate  Outcome: Progressing     Problem: MUSCULOSKELETAL - ADULT  Goal: Maintain or return mobility to safest level of function  Description: INTERVENTIONS:  - Assess patient's ability to carry out ADLs; assess patient's baseline for ADL function and identify physical deficits which impact ability to perform ADLs (bathing, care of mouth/teeth, toileting, grooming, dressing, etc.)  - Assess/evaluate cause of self-care deficits   - Assess range of motion  - Assess patient's mobility  - Assess patient's need for assistive devices and provide as appropriate  - Encourage maximum independence but intervene and supervise when necessary  - Involve family in performance of ADLs  - Assess for home care needs following discharge   - Consider OT consult to assist with ADL evaluation and planning for discharge  - Provide patient education as appropriate  Outcome: Progressing
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
## 2025-02-08 NOTE — ED PROVIDER NOTES
Time reflects when diagnosis was documented in both MDM as applicable and the Disposition within this note       Time User Action Codes Description Comment    2/8/2025 10:00 AM Solomon Davis Add [I10] Hypertension     2/8/2025 10:00 AM Solomon Davis Add [E87.5] Hyperkalemia     2/8/2025 10:00 AM Solomon Davis Add [W19.XXXA] Fall from standing, initial encounter     2/8/2025 10:00 AM Solomon Davis Add [E87.70] Fluid overload     2/8/2025 10:00 AM Solomon Davis Modify [I10] Hypertension     2/8/2025 10:00 AM Solomon Davis Modify [E87.5] Hyperkalemia     2/8/2025 12:20 PM Nedra Shetty Add [N18.6] ESRD (end stage renal disease) ([facility])           ED Disposition       ED Disposition   Admit    Condition   Stable    Date/Time   Sat Feb 8, 2025  9:59 AM    Comment   Case was discussed with JANIA and the patient's admission status was agreed to be Admission Status: observation status to the service of Dr. Shetty .               Assessment & Plan       Medical Decision Making  Patient presenting following fall on ice.  Bony tenderness of the left shoulder with mildly diminished range of motion but otherwise atraumatic.  Cervical spine cleared clinically Via Nexus criteria.  Denies head strike.  I ordered and independently interpreted plain films of the left shoulder to evaluate for fracture or dislocation.  I ordered and independently interpreted plain films of the chest to evaluate for pulmonary edema or cardiomegaly.  I ordered and reviewed lab work including CBC, CMP to evaluate for electrolyte derangement, leukocytosis, anemia. Patient initially hyperkalemic to potassium of 6.  Shoulder x-ray atraumatic.  Chest x-ray demonstrating increased pulmonary vascular congestion.  Patient treated with calcium gluconate for hyperkalemia.  I discussed patient with hospitalist and nephrologist regarding admission for dialysis.  While awaiting bed placement, patient becoming bradycardic into  the 30s and 40s.  Patient complaining of some mild chest pressure but otherwise awake, mentating, and asymptomatic during this episode.  I ordered and independently interpreted an additional EKG which demonstrates junctional bradycardia without ST or T wave abnormalities.  Patient admitted level 2 stepdown on telemetry    Amount and/or Complexity of Data Reviewed  Labs: ordered.  Radiology: ordered.    Risk  Prescription drug management.  Decision regarding hospitalization.    Critical Care Time Statement: Upon my evaluation, this patient had a high probability of imminent or life-threatening deterioration due to hyperkalemia, which required my direct attention, intervention, and personal management.  I spent a total of 75 minutes directly providing critical care services, including interpretation of complex medical databases, management of organ system failure(s) , complex medical decision making (to support/prevent further life-threatening deterioration)., and interpretation of hemodynamic data. This time is exclusive of procedures, teaching, treating other patients, family meetings, and any prior time recorded by providers other than myself.            Medications   calcium gluconate 1 g in sodium chloride 0.9% 50 mL (premix) (0 g Intravenous Stopped 2/8/25 1049)   amLODIPine (NORVASC) tablet 10 mg (10 mg Oral Given 2/8/25 1035)   insulin regular (HumuLIN R,NovoLIN R) injection 10 Units (10 Units Intravenous Given 2/8/25 1215)   dextrose 50 % IV solution 50 mL (50 mL Intravenous Given 2/8/25 1213)   Sodium Zirconium Cyclosilicate (Lokelma) 10 g (10 g Oral Given 2/8/25 1219)   albuterol inhalation solution 2.5 mg (2.5 mg Nebulization Given 2/8/25 1217)       ED Risk Strat Scores                          SBIRT 22yo+      Flowsheet Row Most Recent Value   Initial Alcohol Screen: US AUDIT-C     1. How often do you have a drink containing alcohol? 0 Filed at: 02/08/2025 0814   2. How many drinks containing alcohol do  you have on a typical day you are drinking?  0 Filed at: 02/08/2025 0841   3a. Male UNDER 65: How often do you have five or more drinks on one occasion? 0 Filed at: 02/08/2025 0841   3b. FEMALE Any Age, or MALE 65+: How often do you have 4 or more drinks on one occassion? 0 Filed at: 02/08/2025 0841   Audit-C Score 0 Filed at: 02/08/2025 0841   NICOLÁS: How many times in the past year have you...    Used an illegal drug or used a prescription medication for non-medical reasons? Never Filed at: 02/08/2025 0841                            History of Present Illness       Chief Complaint   Patient presents with    Fall     Patient arrived EMS. Patient reports he was on his way to dialysis, was walking across his yard, and slipped on ice. Patient denies LOC or head strike. Patient reports landing on buttocks and back. Patient c/o lower back pain. Patient on thinners.        Past Medical History:   Diagnosis Date    Acute cystitis 10/18/2024    Acute on chronic anemia 01/23/2022    Atrial fibrillation (HCC)     Bradycardia 09/05/2023    Diabetes mellitus (HCC)     ESRD (end stage renal disease) on dialysis (HCC)     Hematuria 10/10/2024    Hematuria 10/10/2024    Hyperkalemia 04/06/2024    Hyperkalemia 04/06/2024    Hyperlipidemia     Hypertension     Left toe amputee (HCC)     TIA (transient ischemic attack)     Toxic metabolic encephalopathy 10/08/2024      Past Surgical History:   Procedure Laterality Date    AMPUTATION Left     left foot resection 10 years ago dr tran    HEMODIALYSIS ADULT  1/18/2025    IR LOWER EXTREMITY ANGIOGRAM  08/29/2023    IR TEMPORARY DIALYSIS CATHETER PLACEMENT  08/25/2023    IR TUNNELED CENTRAL LINE REMOVAL  08/23/2023    IR TUNNELED DIALYSIS CATHETER PLACEMENT  01/27/2022    IR TUNNELED DIALYSIS CATHETER PLACEMENT  08/30/2023    DE AMPUTATION METATARSAL W/TOE SINGLE Right 8/31/2023    Procedure: RIGHT PARTIAL 1ST RAY RESECTION WITH  WOUND VAC APPLICATION;  Surgeon: Won Parmar DPM;   Location: [facility] OR;  Service: Podiatry      History reviewed. No pertinent family history.   Social History     Tobacco Use    Smoking status: Never     Passive exposure: Never    Smokeless tobacco: Never   Vaping Use    Vaping status: Never Used   Substance Use Topics    Alcohol use: Not Currently     Alcohol/week: 0.0 standard drinks of alcohol     Comment: 0    Drug use: Never      E-Cigarette/Vaping    E-Cigarette Use Never User       E-Cigarette/Vaping Substances    Nicotine No     THC No     CBD No     Flavoring No     Other No     Unknown No       I have reviewed and agree with the history as documented.     Patient is a 64-year-old male history of atrial fibrillation anticoagulated on Eliquis, diabetes, gout, end-stage renal disease on Tuesday Thursday Saturday dialysis schedule presenting for evaluation of fall, missed dialysis session.  Patient states that he missed his Thursday dialysis session due to bad weather.  Patient states that he was trying to walk to his car to go to dialysis today when he slipped on ice and fell onto his buttocks and left shoulder.  Patient denies striking his head.  Patient denies loss of consciousness.  Patient denies headache, neck pain, back pain.  Patient complains of mild buttock pain and moderate left shoulder pain.  Patient is able to flex and abduct his left shoulder but is not able to fully lift it over his head.  Patient denies associated arm weakness or numbness.  Patient admits that he has not been taking his Eliquis for the last few weeks due to difficulty paying for it.  Patient states that he additionally missed multiple dialysis sessions 2 weeks ago due to having the flu.  Patient complains of some mild abdominal distention and shortness of breath, denies chest pain, lightheadedness, fatigue, fevers, chills.        Review of Systems   Constitutional:  Negative for chills, fatigue and fever.   Respiratory:  Negative for cough and shortness of breath.     Gastrointestinal:  Negative for diarrhea, nausea and vomiting.   Musculoskeletal:  Positive for arthralgias (Left shoulder). Negative for back pain.   Neurological:  Negative for weakness, numbness and headaches.   All other systems reviewed and are negative.          Objective       ED Triage Vitals [02/08/25 0839]   Temperature Pulse Blood Pressure Respirations SpO2 Patient Position - Orthostatic VS   97.5 °F (36.4 °C) 62 (!) 211/85 18 95 % Sitting      Temp Source Heart Rate Source BP Location FiO2 (%) Pain Score    Oral Monitor Right arm -- 5      Vitals      Date and Time Temp Pulse SpO2 Resp BP Pain Score FACES Pain Rating User   02/08/25 1418 -- -- -- -- -- 5 -- CN   02/08/25 1414 97 °F (36.1 °C) 68 96 % 17 172/73 -- -- DII   02/08/25 1345 -- 62 95 % 18 174/67 -- -- CG   02/08/25 1330 -- 65 95 % 18 163/70 -- -- MB   02/08/25 1315 -- 67 99 % 18 167/72 -- -- MB   02/08/25 1300 -- 65 97 % 20 -- -- -- MB   02/08/25 1245 -- 56 98 % 18 171/70 -- -- CG   02/08/25 1230 -- 42 100 % 18 195/106 -- -- CG   02/08/25 1215 -- 41 96 % 18 164/72 -- -- CG   02/08/25 1200 -- 41 96 % 18 159/71 -- -- CG   02/08/25 1130 -- 36 96 % 18 186/76 -- -- CG   02/08/25 1100 -- 36 97 % 20 180/74 -- -- CG   02/08/25 1045 -- 56 95 % 18 191/73 -- -- CG   02/08/25 1030 -- 56 95 % 17 217/91 -- -- CG   02/08/25 1024 -- 57 97 % 18 222/91 -- -- CG   02/08/25 0839 97.5 °F (36.4 °C) 62 95 % 18 211/85 5 -- CG            Physical Exam  Vitals and nursing note reviewed.   Constitutional:       General: He is not in acute distress.     Appearance: Normal appearance. He is not ill-appearing, toxic-appearing or diaphoretic.      Comments: Well-appearing, nontoxic, nondistressed   HENT:      Head: Normocephalic and atraumatic.      Comments: Moist mucous membranes.  No scalp hematoma     Right Ear: External ear normal.      Left Ear: External ear normal.   Eyes:      General:         Right eye: No discharge.         Left eye: No discharge.    Cardiovascular:      Comments: Regular rate and rhythm, no murmurs rubs or gallops.  Extremities warm and well-perfused without mottling  Pulmonary:      Effort: No respiratory distress.      Comments: Mild rales in the lung bases right worse than the left.  No increased work of breathing.  Speaking in complete sentences.  Satting 95% on room air indicating adequate oxygenation  Abdominal:      General: There is no distension.   Musculoskeletal:         General: No deformity.      Cervical back: Normal range of motion.      Comments: Moderate pitting bilateral lower extremity edema.  No midline C/T/L-spine tenderness, step-off, deformity.  Mild tenderness of the left anterior shoulder without visible deformity.  Able to flex and abduct but struggles to lift shoulder fully over his head.   Skin:     Findings: No lesion or rash.   Neurological:      Mental Status: He is alert and oriented to person, place, and time. Mental status is at baseline.      Comments: Awake, alert, pleasant, interactive.  Moving all extremities equally   Psychiatric:         Mood and Affect: Mood and affect normal.         Results Reviewed       Procedure Component Value Units Date/Time    HS Troponin I 2hr [[id number]]  (Normal) Collected: 02/08/25 1315    Lab Status: Final result Specimen: Blood from Arm, Right Updated: 02/08/25 1342     hs TnI 2hr 31 ng/L      Delta 2hr hsTnI -1 ng/L     CK [[id number]]  (Normal) Collected: 02/08/25 0906    Lab Status: Final result Specimen: Blood from Arm, Right Updated: 02/08/25 1254     Total CK 43 U/L     Fingerstick Glucose (POCT) [[id number]]  (Normal) Collected: 02/08/25 1213    Lab Status: Final result Specimen: Blood Updated: 02/08/25 1214     POC Glucose 107 mg/dl     Potassium [[id number]]     Lab Status: No result Specimen: Blood     Urinalysis [[id number]]     Lab Status: No result Specimen: Urine     HS Troponin I 4hr [[id number]]     Lab Status: No result Specimen: Blood     Potassium  [[id number]]  (Abnormal) Collected: 02/08/25 1116    Lab Status: Final result Specimen: Blood from Arm, Right Updated: 02/08/25 1158     Potassium 6.3 mmol/L     HS Troponin 0hr (reflex protocol) [[id number]]  (Normal) Collected: 02/08/25 1116    Lab Status: Final result Specimen: Blood from Arm, Right Updated: 02/08/25 1145     hs TnI 0hr 32 ng/L     Comprehensive metabolic panel [[id number]]  (Abnormal) Collected: 02/08/25 0906    Lab Status: Final result Specimen: Blood from Arm, Right Updated: 02/08/25 0927     Sodium 138 mmol/L      Potassium 6.0 mmol/L      Chloride 101 mmol/L      CO2 21 mmol/L      ANION GAP 16 mmol/L      BUN 89 mg/dL      Creatinine 9.78 mg/dL      Glucose 128 mg/dL      Calcium 8.6 mg/dL      AST 90 U/L      ALT 69 U/L      Alkaline Phosphatase 156 U/L      Total Protein 7.4 g/dL      Albumin 4.2 g/dL      Total Bilirubin 0.29 mg/dL      eGFR 4 ml/min/1.73sq m     Narrative:      National Kid[employer] guidelines for Chronic Kidney Disease (CKD):     Stage 1 with normal or high GFR (GFR > 90 mL/min/1.73 square meters)    Stage 2 Mild CKD (GFR = 60-89 mL/min/1.73 square meters)    Stage 3A Moderate CKD (GFR = 45-59 mL/min/1.73 square meters)    Stage 3B Moderate CKD (GFR = 30-44 mL/min/1.73 square meters)    Stage 4 Severe CKD (GFR = 15-29 mL/min/1.73 square meters)    Stage 5 End Stage CKD (GFR <15 mL/min/1.73 square meters)  Note: GFR calculation is accurate only with a steady state creatinine    CBC and differential [[id number]]  (Abnormal) Collected: 02/08/25 0906    Lab Status: Final result Specimen: Blood from Arm, Right Updated: 02/08/25 0911     WBC 5.54 Thousand/uL      RBC 3.73 Million/uL      Hemoglobin 10.9 g/dL      Hematocrit 35.6 %      MCV 95 fL      MCH 29.2 pg      MCHC 30.6 g/dL      RDW 15.6 %      MPV 10.1 fL      Platelets 189 Thousands/uL      nRBC 0 /100 WBCs      Segmented % 62 %      Immature Grans % 1 %      Lymphocytes % 17 %      Monocytes % 11 %       Eosinophils Relative 8 %      Basophils Relative 1 %      Absolute Neutrophils 3.53 Thousands/µL      Absolute Immature Grans 0.03 Thousand/uL      Absolute Lymphocytes 0.92 Thousands/µL      Absolute Monocytes 0.60 Thousand/µL      Eosinophils Absolute 0.43 Thousand/µL      Basophils Absolute 0.03 Thousands/µL             XR chest 1 view portable   Final Interpretation by Jessie Thomas MD (02/08 1309)      Small right pleural effusion.      Lungs are clear.            Workstation performed: [id number]         XR shoulder 2+ views LEFT   Final Interpretation by Shola Montiel MD (02/08 1318)   Possible greater tuberosity avulsion fracture versus calcific tendinitis. CT evaluation should be definitive.      The study was marked in EPIC for immediate notification.         Computerized Assisted Algorithm (CAA) may have been used to analyze all applicable images.         Workstation performed: [id number]             Procedures    ED Medication and Procedure Management   Prior to Admission Medications   Prescriptions Last Dose Informant Patient Reported? Taking?   NIFEdipine (PROCARDIA XL) 30 mg 24 hr tablet 2/7/2025 Bedtime  No Yes   Sig: Take 1 tablet (30 mg total) by mouth daily after dinner   allopurinol (ZYLOPRIM) 100 mg tablet Past Month Self No Yes   Sig: Take 1 tablet (100 mg total) by mouth daily   apixaban (Eliquis) 5 mg Past Month  No Yes   Sig: Take 1 tablet (5 mg total) by mouth 2 (two) times a day   aspirin 81 mg chewable tablet 2/7/2025  No Yes   Sig: Chew 1 tablet (81 mg total) daily   atorvastatin (LIPITOR) 80 mg tablet Past Month  Yes Yes   Sig: Take 80 mg by mouth daily   gabapentin (NEURONTIN) 100 mg capsule   No No   Sig: Take 2 capsules (200 mg total) by mouth 2 (two) times a day   pantoprazole (PROTONIX) 40 mg tablet Not Taking  No No   Sig: Take 1 tablet (40 mg total) by mouth daily in the early morning   Patient not taking: Reported on 2/8/2025   polyethylene glycol (MIRALAX) 17 g packet Not  Taking Self No No   Sig: Take 17 g by mouth daily Do not start before September 7, 2023.   Patient not taking: Reported on 2/8/2025   sevelamer (RENAGEL) 800 mg tablet Past Month Self No Yes   Sig: Take 1 tablet (800 mg total) by mouth 3 (three) times a day with meals      Facility-Administered Medications: None     Current Discharge Medication List        CONTINUE these medications which have NOT CHANGED    Details   allopurinol (ZYLOPRIM) 100 mg tablet Take 1 tablet (100 mg total) by mouth daily  Qty: 30 tablet, Refills: 0    Associated Diagnoses: Gout      apixaban (Eliquis) 5 mg Take 1 tablet (5 mg total) by mouth 2 (two) times a day  Qty: 60 tablet, Refills: 0    Associated Diagnoses: Paroxysmal atrial fibrillation (HCC)      aspirin 81 mg chewable tablet Chew 1 tablet (81 mg total) daily  Qty: 30 tablet, Refills: 0    Associated Diagnoses: Stroke-like symptoms      atorvastatin (LIPITOR) 80 mg tablet Take 80 mg by mouth daily      NIFEdipine (PROCARDIA XL) 30 mg 24 hr tablet Take 1 tablet (30 mg total) by mouth daily after dinner    Associated Diagnoses: Hypertensive urgency      sevelamer (RENAGEL) 800 mg tablet Take 1 tablet (800 mg total) by mouth 3 (three) times a day with meals  Refills: 0    Associated Diagnoses: ESRD (end stage renal disease) ([facility])      gabapentin (NEURONTIN) 100 mg capsule Take 2 capsules (200 mg total) by mouth 2 (two) times a day  Qty: 120 capsule, Refills: 0    Associated Diagnoses: Diabetic polyneuropathy associated with type 2 diabetes mellitus ([facility])      pantoprazole (PROTONIX) 40 mg tablet Take 1 tablet (40 mg total) by mouth daily in the early morning  Qty: 30 tablet, Refills: 0    Associated Diagnoses: Nausea      polyethylene glycol (MIRALAX) 17 g packet Take 17 g by mouth daily Do not start before September 7, 2023.  Refills: 0    Associated Diagnoses: Constipation           No discharge procedures on file.  ED SEPSIS DOCUMENTATION   Time reflects when diagnosis was  documented in both MDM as applicable and the Disposition within this note       Time User Action Codes Description Comment    2/8/2025 10:00 AM Solomon Davis Add [I10] Hypertension     2/8/2025 10:00 AM Solomon Davis Add [E87.5] Hyperkalemia     2/8/2025 10:00 AM Solomon Davis Add [W19.XXXA] Fall from standing, initial encounter     2/8/2025 10:00 AM Solomon Davis Add [E87.70] Fluid overload     2/8/2025 10:00 AM Solomon Davis Modify [I10] Hypertension     2/8/2025 10:00 AM Solomon Davis Modify [E87.5] Hyperkalemia     2/8/2025 12:20 PM Nedra Shetty Add [N18.6] ESRD (end stage renal disease) ([facility])                  Solomon Davis MD  02/08/25 1438       Solomon Davis MD  02/08/25 1439

## 2025-02-08 NOTE — ASSESSMENT & PLAN NOTE
ESRD on HD, TTS at [facility]  Nephrology evaluation  Continue dialysis as per nephro

## 2025-02-08 NOTE — H&P
H&P - Hospitalist   Name: Naresh Carpio 64 y.o. male I MRN: [id number]  Unit/Bed#: ED 10 I Date of Admission: 2/8/2025   Date of Service: 2/8/2025 I Hospital Day: 0     Assessment & Plan  Hypervolemia  In setting of missed dialysis, missed once last week due to GI symptoms and this week on Thursday due to bad weather  Unable to go to HD today due to fall  Evidence of fluid overload with lower extremity swelling, abdominal distention.  Weight 107 kg compared to EDW 99 kg  Chest x-ray small right pleural effusion  2D echo 10/24-EF 60 to 65%, normal wall motion, grade 1 diastolic dysfunction.  PA pressure 30 mmHg  Elevated blood pressure on presentation, saturating adequately on room air.  Nephrology evaluation  Plan for HD today  Fluid restriction  Monitor intake output  Questionable consideration of addition of diuretic on nondialysis days  Hyperkalemia  In setting of missed dialysis recently  Potassium level 6.0-6.3 on admission  EKG without changes attributable to hyperkalemia  Status post calcium gluconate, insulin plus dextrose, Lokelma, albuterol  Potassium level improved to 5.1  Plan for HD today  ESRD (end stage renal disease) on dialysis (HCC)  [facility]  Nephrology evaluation  Continue dialysis as per nephro  Falls  Mechanical fall, slipped on ice while getting into his car going to dialysis  Denies any head injury, syncope, loss of consciousness.  Fall precautions  PT/OT  Left shoulder pain  Status post mechanical fall, reports mild left shoulder pain at baseline but worsening pain after fall today  X-ray with possible greater tuberosity avulsion fracture versus calcific tendinitis  We will get CT to further evaluate  Monitor symptoms,   Pain control, Tylenol as needed  PT/OT  Chest tightness  Reported shortness of breath and chest tightness, likely secondary to volume overload  Mild rales on exam  EKG without any ischemic changes.  Troponin 32-31-33.  Saturating adequately on  room air  Denies any chest pain  Monitor symptoms with fluid removal  Paroxysmal atrial fibrillation (HCC)  EKG-sinus bradycardia/junctional bradycardia,?  Sick sinus syndrome  Patient denies dizziness or syncope  On Eliquis but not been taking recently due to cost  Resume Eliquis  Telemetry  Type II diabetes mellitus (HCC)  Lab Results   Component Value Date    HGBA1C 5.5 10/08/2024       Recent Labs     02/08/25  1213   POCGLU 107       Blood Sugar Average: Last 72 hrs:  (P) 107  Not currently on any medication, reports good glycemic control  Diabetic diet  Will monitor blood glucose trend  Insulin sliding scale  Resume home gabapentin, change to once a day  Hypertension  Blood pressure elevated on presentation in setting of volume overload  Improving  Continue home nifedipine  History of TIA (transient ischemic attack)  Continue aspirin, statin, Eliquis  Anemia in ESRD (end-stage renal disease)  (HCC)  Appears stable, monitor  Hyperphosphatemia  Continue sevelamer      VTE Pharmacologic Prophylaxis:   Moderate Risk (Score 3-4) - Pharmacological DVT Prophylaxis Ordered: apixaban (Eliquis).  Code Status: Level 1 - Full Code       Anticipated Length of Stay: Patient will be admitted on an observation basis with an anticipated length of stay of less than 2 midnights secondary to missed HD.    History of Present Illness   Chief Complaint: Status post fall    Naresh Carpio is a 64 y.o. male with a PMH of fainting ESRD on HD TTS, paroxysmal A-fib on anticoagulation, possible sick sinus, peripheral artery disease with lower extremity to amputation, hypertension, chronic anemia, history of TIA, who presents after mechanical fall.  Patient reported that he was on his way to dialysis and slipped on the ice while trying to get to car.  He fell landing on his buttocks and the back with subsequent lower back pain.  Patient denied any head injury or loss of consciousness and was brought to ED for further evaluation.  In ED  patient denied any headache neck pain or back pain but reported mild buttock pain and moderate left shoulder pain.  Patient denied any tingling numbness or weakness. patient reported that he missed his dialysis session last week once due to GI symptoms and this Thursday due to weather.  Patient also reported poor compliance with Eliquis due to financial constraints.  Additionally patient reported having some chest tightness, shortness of breath and  abdominal distention.  Patient denied any fever, chills, cough, dizziness, lightheadedness, syncope.    Patient was afebrile vital signs were stable blood pressure was elevated saturating 95% on room air at rest.  Chest x-ray revealed small right pleural effusion.  Right jugular dialysis catheter was noted.  Lab revealed normal white count, chronic anemia.  BMP revealed hyperkalemia with potassium of 6.  Case was discussed with nephrology and patient was subsequently admitted for dialysis.  During ED patient received antihypertensive for elevated blood pressure. Initial EKG reveals sinus bradycardia at 58 bpm, QTc 458.  No findings were noted suggestive of changes related to hyperkalemia.  Repeated EKG revealed junctional bradycardia similar to patient's prior history.  Cardiac markers were unremarkable.  Repeat potassium level was 6.3 and patient received treatment for hyperkalemia including calcium gluconate, insulin plus dextrose, albuterol, Lokelma.  Currently patient additionally reports worsening of left shoulder pain after the fall.    Review of Systems   Constitutional:  Negative for fever.   HENT:  Negative for congestion.    Respiratory:  Positive for chest tightness and shortness of breath. Negative for cough.    Cardiovascular:  Negative for chest pain and palpitations.   Gastrointestinal:  Positive for abdominal distention. Negative for abdominal pain, diarrhea, nausea and vomiting.   Musculoskeletal:         Left shoulder pain   Neurological:  Negative for  dizziness, syncope and light-headedness.   Psychiatric/Behavioral:  Negative for confusion.        Historical Information   Past Medical History:   Diagnosis Date    Acute cystitis 10/18/2024    Acute on chronic anemia 01/23/2022    Atrial fibrillation (HCC)     Bradycardia 09/05/2023    Diabetes mellitus (HCC)     ESRD (end stage renal disease) on dialysis (HCC)     Hematuria 10/10/2024    Hematuria 10/10/2024    Hyperkalemia 04/06/2024    Hyperkalemia 04/06/2024    Hyperlipidemia     Hypertension     Left toe amputee (HCC)     TIA (transient ischemic attack)     Toxic metabolic encephalopathy 10/08/2024     Past Surgical History:   Procedure Laterality Date    AMPUTATION Left     left foot resection 10 years ago dr tran    HEMODIALYSIS ADULT  1/18/2025    IR LOWER EXTREMITY ANGIOGRAM  08/29/2023    IR TEMPORARY DIALYSIS CATHETER PLACEMENT  08/25/2023    IR TUNNELED CENTRAL LINE REMOVAL  08/23/2023    IR TUNNELED DIALYSIS CATHETER PLACEMENT  01/27/2022    IR TUNNELED DIALYSIS CATHETER PLACEMENT  08/30/2023    ID AMPUTATION METATARSAL W/TOE SINGLE Right 8/31/2023    Procedure: RIGHT PARTIAL 1ST RAY RESECTION WITH  WOUND VAC APPLICATION;  Surgeon: Won Parmar DPM;  Location[facility];  Service: Podiatry     Social History     Tobacco Use    Smoking status: Never     Passive exposure: Never    Smokeless tobacco: Never   Vaping Use    Vaping status: Never Used   Substance and Sexual Activity    Alcohol use: Not Currently     Alcohol/week: 0.0 standard drinks of alcohol     Comment: 0    Drug use: Never    Sexual activity: Not on file     E-Cigarette/Vaping    E-Cigarette Use Never User      E-Cigarette/Vaping Substances    Nicotine No     THC No     CBD No     Flavoring No     Other No     Unknown No      History reviewed. No pertinent family history.  Social History:  Marital Status:    Occupation: Disabled  Patient Pre-hospital Living Situation: Home  Patient Pre-hospital Level of Mobility:  walks      Meds/Allergies   I have reviewed home medications with patient personally.  Prior to Admission medications    Medication Sig Start Date End Date Taking? Authorizing Provider   allopurinol (ZYLOPRIM) 100 mg tablet Take 1 tablet (100 mg total) by mouth daily 3/22/24  Yes Lore Silver DO   apixaban (Eliquis) 5 mg Take 1 tablet (5 mg total) by mouth 2 (two) times a day 12/2/24  Yes Nallely Pisano PA-C   aspirin 81 mg chewable tablet Chew 1 tablet (81 mg total) daily 10/23/24  Yes Mir Salinas MD   atorvastatin (LIPITOR) 80 mg tablet Take 80 mg by mouth daily 11/2/21  Yes Historical Provider, MD   NIFEdipine (PROCARDIA XL) 30 mg 24 hr tablet Take 1 tablet (30 mg total) by mouth daily after dinner 12/2/24  Yes Nallely Pisano PA-C   sevelamer (RENAGEL) 800 mg tablet Take 1 tablet (800 mg total) by mouth 3 (three) times a day with meals 2/2/22  Yes Lore Silver DO   gabapentin (NEURONTIN) 100 mg capsule Take 2 capsules (200 mg total) by mouth 2 (two) times a day 3/22/24 11/19/24  Lore Silver DO   pantoprazole (PROTONIX) 40 mg tablet Take 1 tablet (40 mg total) by mouth daily in the early morning  Patient not taking: Reported on 2/8/2025 10/23/24   Mir Salinas MD   polyethylene glycol (MIRALAX) 17 g packet Take 17 g by mouth daily Do not start before September 7, 2023.  Patient not taking: Reported on 2/8/2025 9/7/23   Nallely Pisano PA-C     No Known Allergies    Objective :  Temp:  [97.5 °F (36.4 °C)] 97.5 °F (36.4 °C)  HR:  [36-67] 67  BP: (159-222)/() 167/72  Resp:  [17-20] 18  SpO2:  [95 %-100 %] 99 %  O2 Device: None (Room air)    Physical Exam  Constitutional:       General: He is not in acute distress.     Appearance: He is obese.   HENT:      Head: Normocephalic and atraumatic.   Cardiovascular:      Rate and Rhythm: Normal rate.      Comments: Right infraclavicular dialysis catheter  Pulmonary:      Effort: Pulmonary effort is normal. No respiratory distress.      Breath sounds:  Decreased breath sounds and rales (Right more than left) present. No rhonchi.   Abdominal:      General: Bowel sounds are normal. There is distension.      Palpations: Abdomen is soft.      Tenderness: There is no abdominal tenderness. There is no guarding or rebound.   Musculoskeletal:         General: Deformity (Left great toe amputation) present.      Right lower leg: Edema present.      Left lower leg: Edema present.      Comments: Mild tenderness over left anterior shoulder, no deformity.  Pain with range of motion.  Bilateral lower extremity edema, venous stasis and congestion     Skin:     General: Skin is warm and dry.      Findings: Lesion (Scabbed over dry right toe wounds) present. No rash.   Neurological:      Mental Status: He is alert. Mental status is at baseline.      Cranial Nerves: No cranial nerve deficit.          Lines/Drains:            Lab Results: I have reviewed the following results:  Results from last 7 days   Lab Units 02/08/25  0906   WBC Thousand/uL 5.54   HEMOGLOBIN g/dL 10.9*   HEMATOCRIT % 35.6*   PLATELETS Thousands/uL 189   SEGS PCT % 62   LYMPHO PCT % 17   MONO PCT % 11   EOS PCT % 8*     Results from last 7 days   Lab Units 02/08/25  1116 02/08/25  0906   SODIUM mmol/L  --  138   POTASSIUM mmol/L 6.3* 6.0*   CHLORIDE mmol/L  --  101   CO2 mmol/L  --  21   BUN mg/dL  --  89*   CREATININE mg/dL  --  9.78*   ANION GAP mmol/L  --  16*   CALCIUM mg/dL  --  8.6   ALBUMIN g/dL  --  4.2   TOTAL BILIRUBIN mg/dL  --  0.29   ALK PHOS U/L  --  156*   ALT U/L  --  69*   AST U/L  --  90*   GLUCOSE RANDOM mg/dL  --  128         Results from last 7 days   Lab Units 02/08/25  1213   POC GLUCOSE mg/dl 107     Lab Results   Component Value Date    HGBA1C 5.5 10/08/2024    HGBA1C 6.2 (H) 07/02/2024    HGBA1C 5.4 03/12/2024           Imaging Results Review: I reviewed radiology reports from this admission including: chest xray and xray(s).  Other Study Results Review: EKG was reviewed.      Administrative Statements   I have spent a total time of 35 minutes in caring for this patient on the day of the visit/encounter including Instructions for management, Patient and family education, Importance of tx compliance, Counseling / Coordination of care, Documenting in the medical record, Reviewing / ordering tests, medicine, procedures  , Obtaining or reviewing history  , and Communicating with other healthcare professionals .    ** Please Note: This note has been constructed using a voice recognition system. **

## 2025-02-08 NOTE — ASSESSMENT & PLAN NOTE
Status post mechanical fall, reports mild left shoulder pain at baseline but worsening pain after fall today  X-ray with possible greater tuberosity avulsion fracture versus calcific tendinitis  We will get CT to further evaluate  Monitor symptoms,   Pain control, Tylenol as needed  PT/OT

## 2025-02-08 NOTE — ASSESSMENT & PLAN NOTE
Lab Results   Component Value Date    EGFR 4 02/08/2025    EGFR 5 01/18/2025    EGFR 6 01/17/2025    CREATININE 9.78 (H) 02/08/2025    CREATININE 9.36 (H) 01/18/2025    CREATININE 8.06 (H) 01/17/2025

## 2025-02-08 NOTE — ASSESSMENT & PLAN NOTE
Blood pressure elevated on presentation in setting of volume overload  Improving  Continue home nifedipine
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
## 2025-02-08 NOTE — ASSESSMENT & PLAN NOTE
Lab Results   Component Value Date    HGBA1C 5.5 10/08/2024       Recent Labs     02/08/25  1213   POCGLU 107       Blood Sugar Average: Last 72 hrs:  (P) 107
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
## 2025-02-08 NOTE — ASSESSMENT & PLAN NOTE
- medically treated with insulin/d5, albuterol, lokelma, calcium gluc  - will dialyze with 2k/1k bath today  - low K diet

## 2025-02-09 ENCOUNTER — APPOINTMENT (OUTPATIENT)
Dept: RADIOLOGY | Facility: HOSPITAL | Age: 65
DRG: 640 | End: 2025-02-09
Payer: MEDICARE

## 2025-02-09 PROBLEM — R07.89 CHEST TIGHTNESS: Status: RESOLVED | Noted: 2025-02-08 | Resolved: 2025-02-09

## 2025-02-09 PROBLEM — E87.5 HYPERKALEMIA: Status: RESOLVED | Noted: 2024-04-06 | Resolved: 2025-02-09

## 2025-02-09 LAB
ALBUMIN SERPL BCG-MCNC: 3.8 G/DL (ref 3.5–5)
ALP SERPL-CCNC: 150 U/L (ref 34–104)
ALT SERPL W P-5'-P-CCNC: 45 U/L (ref 7–52)
ANION GAP SERPL CALCULATED.3IONS-SCNC: 15 MMOL/L (ref 4–13)
AST SERPL W P-5'-P-CCNC: 27 U/L (ref 13–39)
ATRIAL RATE: 35 BPM
ATRIAL RATE: 58 BPM
BILIRUB SERPL-MCNC: 0.35 MG/DL (ref 0.2–1)
BUN SERPL-MCNC: 57 MG/DL (ref 5–25)
CALCIUM SERPL-MCNC: 8.6 MG/DL (ref 8.4–10.2)
CHLORIDE SERPL-SCNC: 98 MMOL/L (ref 96–108)
CO2 SERPL-SCNC: 23 MMOL/L (ref 21–32)
CREAT SERPL-MCNC: 7.06 MG/DL (ref 0.6–1.3)
ERYTHROCYTE [DISTWIDTH] IN BLOOD BY AUTOMATED COUNT: 15.5 % (ref 11.6–15.1)
EST. AVERAGE GLUCOSE BLD GHB EST-MCNC: 117 MG/DL
GFR SERPL CREATININE-BSD FRML MDRD: 7 ML/MIN/1.73SQ M
GLUCOSE P FAST SERPL-MCNC: 120 MG/DL (ref 65–99)
GLUCOSE SERPL-MCNC: 109 MG/DL (ref 65–140)
GLUCOSE SERPL-MCNC: 120 MG/DL (ref 65–140)
GLUCOSE SERPL-MCNC: 139 MG/DL (ref 65–140)
GLUCOSE SERPL-MCNC: 149 MG/DL (ref 65–140)
GLUCOSE SERPL-MCNC: 173 MG/DL (ref 65–140)
HBA1C MFR BLD: 5.7 %
HCT VFR BLD AUTO: 32.7 % (ref 36.5–49.3)
HGB BLD-MCNC: 10.2 G/DL (ref 12–17)
MCH RBC QN AUTO: 29.1 PG (ref 26.8–34.3)
MCHC RBC AUTO-ENTMCNC: 31.2 G/DL (ref 31.4–37.4)
MCV RBC AUTO: 93 FL (ref 82–98)
P AXIS: 66 DEGREES
PLATELET # BLD AUTO: 200 THOUSANDS/UL (ref 149–390)
PMV BLD AUTO: 10.6 FL (ref 8.9–12.7)
POTASSIUM SERPL-SCNC: 4.2 MMOL/L (ref 3.5–5.3)
PR INTERVAL: 194 MS
PROT SERPL-MCNC: 7.1 G/DL (ref 6.4–8.4)
QRS AXIS: 12 DEGREES
QRS AXIS: 16 DEGREES
QRSD INTERVAL: 76 MS
QRSD INTERVAL: 88 MS
QT INTERVAL: 466 MS
QT INTERVAL: 516 MS
QTC INTERVAL: 399 MS
QTC INTERVAL: 457 MS
RBC # BLD AUTO: 3.51 MILLION/UL (ref 3.88–5.62)
SODIUM SERPL-SCNC: 136 MMOL/L (ref 135–147)
T WAVE AXIS: 45 DEGREES
T WAVE AXIS: 68 DEGREES
VENTRICULAR RATE: 36 BPM
VENTRICULAR RATE: 58 BPM
WBC # BLD AUTO: 7.77 THOUSAND/UL (ref 4.31–10.16)

## 2025-02-09 PROCEDURE — 73200 CT UPPER EXTREMITY W/O DYE: CPT

## 2025-02-09 PROCEDURE — 99214 OFFICE O/P EST MOD 30 MIN: CPT | Performed by: INTERNAL MEDICINE

## 2025-02-09 PROCEDURE — 93010 ELECTROCARDIOGRAM REPORT: CPT | Performed by: INTERNAL MEDICINE

## 2025-02-09 PROCEDURE — 85027 COMPLETE CBC AUTOMATED: CPT | Performed by: INTERNAL MEDICINE

## 2025-02-09 PROCEDURE — 82948 REAGENT STRIP/BLOOD GLUCOSE: CPT

## 2025-02-09 PROCEDURE — 99232 SBSQ HOSP IP/OBS MODERATE 35: CPT | Performed by: INTERNAL MEDICINE

## 2025-02-09 PROCEDURE — 80053 COMPREHEN METABOLIC PANEL: CPT | Performed by: INTERNAL MEDICINE

## 2025-02-09 PROCEDURE — 83036 HEMOGLOBIN GLYCOSYLATED A1C: CPT | Performed by: INTERNAL MEDICINE

## 2025-02-09 RX ORDER — GABAPENTIN 100 MG/1
200 CAPSULE ORAL EVERY EVENING
Status: DISCONTINUED | OUTPATIENT
Start: 2025-02-09 | End: 2025-02-11 | Stop reason: HOSPADM

## 2025-02-09 RX ORDER — OXYCODONE HYDROCHLORIDE 5 MG/1
5 TABLET ORAL EVERY 8 HOURS PRN
Refills: 0 | Status: DISCONTINUED | OUTPATIENT
Start: 2025-02-09 | End: 2025-02-10

## 2025-02-09 RX ORDER — GABAPENTIN 100 MG/1
200 CAPSULE ORAL
Status: DISCONTINUED | OUTPATIENT
Start: 2025-02-09 | End: 2025-02-09

## 2025-02-09 RX ADMIN — INSULIN LISPRO 1 UNITS: 100 INJECTION, SOLUTION INTRAVENOUS; SUBCUTANEOUS at 22:46

## 2025-02-09 RX ADMIN — SEVELAMER HYDROCHLORIDE 800 MG: 800 TABLET, FILM COATED ORAL at 17:22

## 2025-02-09 RX ADMIN — ALLOPURINOL 100 MG: 100 TABLET ORAL at 08:10

## 2025-02-09 RX ADMIN — NIFEDIPINE 30 MG: 30 TABLET, EXTENDED RELEASE ORAL at 17:24

## 2025-02-09 RX ADMIN — ATORVASTATIN CALCIUM 80 MG: 80 TABLET, FILM COATED ORAL at 08:10

## 2025-02-09 RX ADMIN — SEVELAMER HYDROCHLORIDE 800 MG: 800 TABLET, FILM COATED ORAL at 12:23

## 2025-02-09 RX ADMIN — GABAPENTIN 200 MG: 100 CAPSULE ORAL at 17:29

## 2025-02-09 RX ADMIN — ASPIRIN 81 MG CHEWABLE TABLET 81 MG: 81 TABLET CHEWABLE at 08:10

## 2025-02-09 RX ADMIN — OXYCODONE HYDROCHLORIDE 5 MG: 5 TABLET ORAL at 06:36

## 2025-02-09 RX ADMIN — APIXABAN 5 MG: 5 TABLET, FILM COATED ORAL at 17:22

## 2025-02-09 RX ADMIN — Medication 2.5 MG: at 00:18

## 2025-02-09 RX ADMIN — OXYCODONE HYDROCHLORIDE 5 MG: 5 TABLET ORAL at 15:00

## 2025-02-09 RX ADMIN — SEVELAMER HYDROCHLORIDE 800 MG: 800 TABLET, FILM COATED ORAL at 08:10

## 2025-02-09 RX ADMIN — APIXABAN 5 MG: 5 TABLET, FILM COATED ORAL at 08:10

## 2025-02-09 RX ADMIN — OXYCODONE HYDROCHLORIDE 5 MG: 5 TABLET ORAL at 22:51

## 2025-02-09 NOTE — PLAN OF CARE
Problem: Potential for Falls  Goal: Patient will remain free of falls  Description: INTERVENTIONS:  - Educate patient/family on patient safety including physical limitations  - Instruct patient to call for assistance with activity   - Consult OT/PT to assist with strengthening/mobility   - Keep Call bell within reach  - Keep bed low and locked with side rails adjusted as appropriate  - Keep care items and personal belongings within reach  - Initiate and maintain comfort rounds  - Make Fall Risk Sign visible to staff  - Offer Toileting every 2 Hours, in advance of need  - Initiate/Maintain bed alarm  - Obtain necessary fall risk management equipment: fall risk sign on door  - Apply yellow socks and bracelet for high fall risk patients  - Consider moving patient to room near nurses station  Outcome: Progressing     Problem: PAIN - ADULT  Goal: Verbalizes/displays adequate comfort level or baseline comfort level  Description: Interventions:  - Encourage patient to monitor pain and request assistance  - Assess pain using appropriate pain scale  - Administer analgesics based on type and severity of pain and evaluate response  - Implement non-pharmacological measures as appropriate and evaluate response  - Consider cultural and social influences on pain and pain management  - Notify physician/advanced practitioner if interventions unsuccessful or patient reports new pain  Outcome: Progressing     Problem: INFECTION - ADULT  Goal: Absence or prevention of progression during hospitalization  Description: INTERVENTIONS:  - Assess and monitor for signs and symptoms of infection  - Monitor lab/diagnostic results  - Monitor all insertion sites, i.e. indwelling lines, tubes, and drains  - Monitor endotracheal if appropriate and nasal secretions for changes in amount and color  - [place] appropriate cooling/warming therapies per order  - Administer medications as ordered  - Instruct and encourage patient and family to use  good hand hygiene technique  - Identify and instruct in appropriate isolation precautions for identified infection/condition  Outcome: Progressing  Goal: Absence of fever/infection during neutropenic period  Description: INTERVENTIONS:  - Monitor WBC    Outcome: Progressing     Problem: SAFETY ADULT  Goal: Patient will remain free of falls  Description: INTERVENTIONS:  - Educate patient/family on patient safety including physical limitations  - Instruct patient to call for assistance with activity   - Consult OT/PT to assist with strengthening/mobility   - Keep Call bell within reach  - Keep bed low and locked with side rails adjusted as appropriate  - Keep care items and personal belongings within reach  - Initiate and maintain comfort rounds  - Make Fall Risk Sign visible to staff  - Offer Toileting every 2 Hours, in advance of need  - Initiate/Maintain bed alarm  - Obtain necessary fall risk management equipment: fall risk sign on door  - Apply yellow socks and bracelet for high fall risk patients  - Consider moving patient to room near nurses station  Outcome: Progressing  Goal: Maintain or return to baseline ADL function  Description: INTERVENTIONS:  -  Assess patient's ability to carry out ADLs; assess patient's baseline for ADL function and identify physical deficits which impact ability to perform ADLs (bathing, care of mouth/teeth, toileting, grooming, dressing, etc.)  - Assess/evaluate cause of self-care deficits   - Assess range of motion  - Assess patient's mobility; develop plan if impaired  - Assess patient's need for assistive devices and provide as appropriate  - Encourage maximum independence but intervene and supervise when necessary  - Involve family in performance of ADLs  - Assess for home care needs following discharge   - Consider OT consult to assist with ADL evaluation and planning for discharge  - Provide patient education as appropriate  Outcome: Progressing  Goal: Maintains/Returns to pre  admission functional level  Description: INTERVENTIONS:  - Perform AM-PAC 6 Click Basic Mobility/ Daily Activity assessment daily.  - Set and communicate daily mobility goal to care team and patient/family/caregiver.   - Collaborate with rehabilitation services on mobility goals if consulted  - Perform Range of Motion 2 times a day.  - Reposition patient every 2 hours.  - Dangle patient 2 times a day  - Stand patient 2 times a day  - Ambulate patient 3 times a day  - Out of bed to chair 3 times a day   - Out of bed for meals 3 times a day  - Out of bed for toileting  - Record patient progress and toleration of activity level   Outcome: Progressing     Problem: DISCHARGE PLANNING  Goal: Discharge to home or other facility with appropriate resources  Description: INTERVENTIONS:  - Identify barriers to discharge w/patient and caregiver  - Arrange for needed discharge resources and transportation as appropriate  - Identify discharge learning needs (meds, wound care, etc.)  - Arrange for interpretive services to assist at discharge as needed  - Refer to Case Management Department for coordinating discharge planning if the patient needs post-hospital services based on physician/advanced practitioner order or complex needs related to functional status, cognitive ability, or social support system  Outcome: Progressing     Problem: Knowledge Deficit  Goal: Patient/family/caregiver demonstrates understanding of disease process, treatment plan, medications, and discharge instructions  Description: Complete learning assessment and assess knowledge base.  Interventions:  - Provide teaching at level of understanding  - Provide teaching via preferred learning methods  Outcome: Progressing     Problem: CARDIOVASCULAR - ADULT  Goal: Maintains optimal cardiac output and hemodynamic stability  Description: INTERVENTIONS:  - Monitor I/O, vital signs and rhythm  - Monitor for S/S and trends of decreased cardiac output  - Administer and  titrate ordered vasoactive medications to optimize hemodynamic stability  - Assess quality of pulses, skin color and temperature  - Assess for signs of decreased coronary artery perfusion  - Instruct patient to report change in severity of symptoms  Outcome: Progressing  Goal: Absence of cardiac dysrhythmias or at baseline rhythm  Description: INTERVENTIONS:  - Continuous cardiac monitoring, vital signs, obtain 12 lead EKG if ordered  - Administer antiarrhythmic and heart rate control medications as ordered  - Monitor electrolytes and administer replacement therapy as ordered  Outcome: Progressing     Problem: RESPIRATORY - ADULT  Goal: Achieves optimal ventilation and oxygenation  Description: INTERVENTIONS:  - Assess for changes in respiratory status  - Assess for changes in mentation and behavior  - Position to facilitate oxygenation and minimize respiratory effort  - Oxygen administered by appropriate delivery if ordered  - Initiate smoking cessation education as indicated  - Encourage broncho-pulmonary hygiene including cough, deep breathe, Incentive Spirometry  - Assess the need for suctioning and aspirate as needed  - Assess and instruct to report SOB or any respiratory difficulty  - Respiratory Therapy support as indicated  Outcome: Progressing     Problem: GENITOURINARY - ADULT  Goal: Maintains or returns to baseline urinary function  Description: INTERVENTIONS:  - Assess urinary function  - Encourage oral fluids to ensure adequate hydration if ordered  - Administer IV fluids as ordered to ensure adequate hydration  - Administer ordered medications as needed  - Offer frequent toileting  - Follow urinary retention protocol if ordered  Outcome: Progressing     Problem: METABOLIC, FLUID AND ELECTROLYTES - ADULT  Goal: Electrolytes maintained within normal limits  Description: INTERVENTIONS:  - Monitor labs and assess patient for signs and symptoms of electrolyte imbalances  - Administer electrolyte replacement  as ordered  - Monitor response to electrolyte replacements, including repeat lab results as appropriate  - Instruct patient on fluid and nutrition as appropriate  Outcome: Progressing  Goal: Fluid balance maintained  Description: INTERVENTIONS:  - Monitor labs   - Monitor I/O and WT  - Instruct patient on fluid and nutrition as appropriate  - Assess for signs & symptoms of volume excess or deficit  Outcome: Progressing  Goal: Glucose maintained within target range  Description: INTERVENTIONS:  - Monitor Blood Glucose as ordered  - Assess for signs and symptoms of hyperglycemia and hypoglycemia  - Administer ordered medications to maintain glucose within target range  - Assess nutritional intake and initiate nutrition service referral as needed  Outcome: Progressing     Problem: HEMATOLOGIC - ADULT  Goal: Maintains hematologic stability  Description: INTERVENTIONS  - Assess for signs and symptoms of bleeding or hemorrhage  - Monitor labs  - Administer supportive blood products/factors as ordered and appropriate  Outcome: Progressing     Problem: MUSCULOSKELETAL - ADULT  Goal: Maintain or return mobility to safest level of function  Description: INTERVENTIONS:  - Assess patient's ability to carry out ADLs; assess patient's baseline for ADL function and identify physical deficits which impact ability to perform ADLs (bathing, care of mouth/teeth, toileting, grooming, dressing, etc.)  - Assess/evaluate cause of self-care deficits   - Assess range of motion  - Assess patient's mobility  - Assess patient's need for assistive devices and provide as appropriate  - Encourage maximum independence but intervene and supervise when necessary  - Involve family in performance of ADLs  - Assess for home care needs following discharge   - Consider OT consult to assist with ADL evaluation and planning for discharge  - Provide patient education as appropriate  Outcome: Progressing
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
## 2025-02-09 NOTE — ASSESSMENT & PLAN NOTE
ESRD on HD, TTS at [facility]  Nephrology following, input appreciated  Continue dialysis as per nephro
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
## 2025-02-09 NOTE — ASSESSMENT & PLAN NOTE
Status post mechanical fall, reports mild left shoulder pain at baseline but worsening pain after fall today  X-ray with possible greater tuberosity avulsion fracture versus calcific tendinitis  CT with minimally displaced fracture of anterior facet of the greater tuberosity, evidence of degenerative changes  Ortho eval  Monitor symptoms,   Pain control, Tylenol as needed  PT/OT

## 2025-02-09 NOTE — ASSESSMENT & PLAN NOTE
EKG-sinus bradycardia/junctional bradycardia,?  Sick sinus syndrome  Patient denies dizziness or syncope  Telemetry with sinus rhythm/sinus bradycardia  On Eliquis but not been taking recently due to cost  Resume Eliquis  Case management follow up

## 2025-02-09 NOTE — ASSESSMENT & PLAN NOTE
Lab Results   Component Value Date    HGBA1C 5.5 10/08/2024       Recent Labs     02/08/25  1213 02/08/25  1930 02/08/25 2003 02/09/25  0731   POCGLU 107 87 115 109       Blood Sugar Average: Last 72 hrs:  (P) 104.5  Not currently on any medication, reports good glycemic control  Controlled,  Diabetic diet  Will monitor blood glucose trend  Insulin sliding scale  Resume home gabapentin, renally adjusted to once a day

## 2025-02-09 NOTE — PLAN OF CARE
Problem: Potential for Falls  Goal: Patient will remain free of falls  Description: INTERVENTIONS:  - Educate patient/family on patient safety including physical limitations  - Instruct patient to call for assistance with activity   - Consult OT/PT to assist with strengthening/mobility   - Keep Call bell within reach  - Keep bed low and locked with side rails adjusted as appropriate  - Keep care items and personal belongings within reach  - Initiate and maintain comfort rounds  - Make Fall Risk Sign visible to staff  - Offer Toileting every 2 Hours, in advance of need  - Initiate/Maintain bed alarm  - Obtain necessary fall risk management equipment: fall risk sign on door  - Apply yellow socks and bracelet for high fall risk patients  - Consider moving patient to room near nurses station  Outcome: Progressing     Problem: PAIN - ADULT  Goal: Verbalizes/displays adequate comfort level or baseline comfort level  Description: Interventions:  - Encourage patient to monitor pain and request assistance  - Assess pain using appropriate pain scale  - Administer analgesics based on type and severity of pain and evaluate response  - Implement non-pharmacological measures as appropriate and evaluate response  - Consider cultural and social influences on pain and pain management  - Notify physician/advanced practitioner if interventions unsuccessful or patient reports new pain  Outcome: Progressing     Problem: INFECTION - ADULT  Goal: Absence or prevention of progression during hospitalization  Description: INTERVENTIONS:  - Assess and monitor for signs and symptoms of infection  - Monitor lab/diagnostic results  - Monitor all insertion sites, i.e. indwelling lines, tubes, and drains  - Monitor endotracheal if appropriate and nasal secretions for changes in amount and color  - Crook appropriate cooling/warming therapies per order  - Administer medications as ordered  - Instruct and encourage patient and family to use  good hand hygiene technique  - Identify and instruct in appropriate isolation precautions for identified infection/condition  Outcome: Progressing  Goal: Absence of fever/infection during neutropenic period  Description: INTERVENTIONS:  - Monitor WBC    Outcome: Progressing     Problem: SAFETY ADULT  Goal: Patient will remain free of falls  Description: INTERVENTIONS:  - Educate patient/family on patient safety including physical limitations  - Instruct patient to call for assistance with activity   - Consult OT/PT to assist with strengthening/mobility   - Keep Call bell within reach  - Keep bed low and locked with side rails adjusted as appropriate  - Keep care items and personal belongings within reach  - Initiate and maintain comfort rounds  - Make Fall Risk Sign visible to staff  - Offer Toileting every 2 Hours, in advance of need  - Initiate/Maintain bed alarm  - Obtain necessary fall risk management equipment: fall risk sign on door  - Apply yellow socks and bracelet for high fall risk patients  - Consider moving patient to room near nurses station  Outcome: Progressing  Goal: Maintain or return to baseline ADL function  Description: INTERVENTIONS:  -  Assess patient's ability to carry out ADLs; assess patient's baseline for ADL function and identify physical deficits which impact ability to perform ADLs (bathing, care of mouth/teeth, toileting, grooming, dressing, etc.)  - Assess/evaluate cause of self-care deficits   - Assess range of motion  - Assess patient's mobility; develop plan if impaired  - Assess patient's need for assistive devices and provide as appropriate  - Encourage maximum independence but intervene and supervise when necessary  - Involve family in performance of ADLs  - Assess for home care needs following discharge   - Consider OT consult to assist with ADL evaluation and planning for discharge  - Provide patient education as appropriate  Outcome: Progressing  Goal: Maintains/Returns to pre  admission functional level  Description: INTERVENTIONS:  - Perform AM-PAC 6 Click Basic Mobility/ Daily Activity assessment daily.  - Set and communicate daily mobility goal to care team and patient/family/caregiver.   - Collaborate with rehabilitation services on mobility goals if consulted  - Perform Range of Motion 2 times a day.  - Reposition patient every 2 hours.  - Dangle patient 2 times a day  - Stand patient 2 times a day  - Ambulate patient 3 times a day  - Out of bed to chair 3 times a day   - Out of bed for meals 3 times a day  - Out of bed for toileting  - Record patient progress and toleration of activity level   Outcome: Progressing     Problem: DISCHARGE PLANNING  Goal: Discharge to home or other facility with appropriate resources  Description: INTERVENTIONS:  - Identify barriers to discharge w/patient and caregiver  - Arrange for needed discharge resources and transportation as appropriate  - Identify discharge learning needs (meds, wound care, etc.)  - Arrange for interpretive services to assist at discharge as needed  - Refer to Case Management Department for coordinating discharge planning if the patient needs post-hospital services based on physician/advanced practitioner order or complex needs related to functional status, cognitive ability, or social support system  Outcome: Progressing     Problem: Knowledge Deficit  Goal: Patient/family/caregiver demonstrates understanding of disease process, treatment plan, medications, and discharge instructions  Description: Complete learning assessment and assess knowledge base.  Interventions:  - Provide teaching at level of understanding  - Provide teaching via preferred learning methods  Outcome: Progressing     Problem: CARDIOVASCULAR - ADULT  Goal: Maintains optimal cardiac output and hemodynamic stability  Description: INTERVENTIONS:  - Monitor I/O, vital signs and rhythm  - Monitor for S/S and trends of decreased cardiac output  - Administer and  titrate ordered vasoactive medications to optimize hemodynamic stability  - Assess quality of pulses, skin color and temperature  - Assess for signs of decreased coronary artery perfusion  - Instruct patient to report change in severity of symptoms  Outcome: Progressing  Goal: Absence of cardiac dysrhythmias or at baseline rhythm  Description: INTERVENTIONS:  - Continuous cardiac monitoring, vital signs, obtain 12 lead EKG if ordered  - Administer antiarrhythmic and heart rate control medications as ordered  - Monitor electrolytes and administer replacement therapy as ordered  Outcome: Progressing     Problem: RESPIRATORY - ADULT  Goal: Achieves optimal ventilation and oxygenation  Description: INTERVENTIONS:  - Assess for changes in respiratory status  - Assess for changes in mentation and behavior  - Position to facilitate oxygenation and minimize respiratory effort  - Oxygen administered by appropriate delivery if ordered  - Initiate smoking cessation education as indicated  - Encourage broncho-pulmonary hygiene including cough, deep breathe, Incentive Spirometry  - Assess the need for suctioning and aspirate as needed  - Assess and instruct to report SOB or any respiratory difficulty  - Respiratory Therapy support as indicated  Outcome: Progressing     Problem: GENITOURINARY - ADULT  Goal: Maintains or returns to baseline urinary function  Description: INTERVENTIONS:  - Assess urinary function  - Encourage oral fluids to ensure adequate hydration if ordered  - Administer IV fluids as ordered to ensure adequate hydration  - Administer ordered medications as needed  - Offer frequent toileting  - Follow urinary retention protocol if ordered  Outcome: Progressing     Problem: METABOLIC, FLUID AND ELECTROLYTES - ADULT  Goal: Electrolytes maintained within normal limits  Description: INTERVENTIONS:  - Monitor labs and assess patient for signs and symptoms of electrolyte imbalances  - Administer electrolyte replacement  as ordered  - Monitor response to electrolyte replacements, including repeat lab results as appropriate  - Instruct patient on fluid and nutrition as appropriate  Outcome: Progressing  Goal: Fluid balance maintained  Description: INTERVENTIONS:  - Monitor labs   - Monitor I/O and WT  - Instruct patient on fluid and nutrition as appropriate  - Assess for signs & symptoms of volume excess or deficit  Outcome: Progressing  Goal: Glucose maintained within target range  Description: INTERVENTIONS:  - Monitor Blood Glucose as ordered  - Assess for signs and symptoms of hyperglycemia and hypoglycemia  - Administer ordered medications to maintain glucose within target range  - Assess nutritional intake and initiate nutrition service referral as needed  Outcome: Progressing     Problem: HEMATOLOGIC - ADULT  Goal: Maintains hematologic stability  Description: INTERVENTIONS  - Assess for signs and symptoms of bleeding or hemorrhage  - Monitor labs  - Administer supportive blood products/factors as ordered and appropriate  Outcome: Progressing     Problem: MUSCULOSKELETAL - ADULT  Goal: Maintain or return mobility to safest level of function  Description: INTERVENTIONS:  - Assess patient's ability to carry out ADLs; assess patient's baseline for ADL function and identify physical deficits which impact ability to perform ADLs (bathing, care of mouth/teeth, toileting, grooming, dressing, etc.)  - Assess/evaluate cause of self-care deficits   - Assess range of motion  - Assess patient's mobility  - Assess patient's need for assistive devices and provide as appropriate  - Encourage maximum independence but intervene and supervise when necessary  - Involve family in performance of ADLs  - Assess for home care needs following discharge   - Consider OT consult to assist with ADL evaluation and planning for discharge  - Provide patient education as appropriate  Outcome: Progressing

## 2025-02-09 NOTE — ASSESSMENT & PLAN NOTE
Treating Pneumonia  Pneumonia is an infection of one or both of the lungs. Pneumonia:  · Is usually caused by either a virus or a bacteria  · Can be very serious, especially in infants, young children, and older adults. Its also serious for those with other long-term health problems or weakened immune systems.  · Is sometimes treated at home and sometimes in the hospital    Antibiotic medicines  Antibiotics may be prescribed for pneumonia caused by bacteria. They may be pills (oral medicines), or shots (injections). Or they may be given by IV (intravenously) into a vein. If you are taking oral medicines at home:  · Fill your prescription and start taking your medicine as soon as you can.  · You will likely start to feel better in a day or 2, but dont stop taking the antibiotic.  · Use a pill organizer to help you remember to take your medicine.  · Let your healthcare provider know if you have side effects.  · Take your medicine exactly as directed on the label. Talk to your provider or pharmacist if you have any questions.  Antiviral medicines  Antiviral medicine may be prescribed for pneumonia caused by a virus. For example, antiviral medicine may be prescribed for pneumonia caused by the flu virus. Antibiotics do not work against viruses. If you are taking antiviral medicine at home:  · Fill your prescription and start taking your medicine as soon as you can.  · Talk with your provider or pharmacist about possible side effects.  · Take the medicine exactly as instructed.  To relieve symptoms  There are many medicines that can help relieve symptoms of pneumonia. Some are prescription and some are over-the-counter.  Your healthcare provider may recommend:  · Acetaminophen or ibuprofen to lower your fever and to lessen headache or other pain  · Cough medicine to loosen mucus or to reduce coughing  Make sure you check with your healthcare provider or pharmacist before taking any over-the-counter  - challenge EDW with HD  - patient significantly above EDW with LE edema and subjective SOB  -Status post hemodialysis treatment on 2/8 still above the target weight and has lower extremity edema, will plan for extra hemodialysis treatment tomorrow   medicines.  Special treatments  If you are hospitalized for pneumonia, you may have other therapies, including:  · Inhaled medicines to help with breathing or chest congestion  · Supplemental oxygen to increase low oxygen levels  Drink fluids and eat healthy  You should eat healthy to help your body fight the infection. Drinking a lot of fluids helps to replace fluids lost from fever and to loosen mucus in your chest.  · Diet. Make healthy food choices, including fruits and vegetables, lean meats and other proteins, 100% whole grain and low- or no-fat dairy products.  · Fluids. Drink at least 6 to 8 tall glasses a day. Water and 100% fruit or vegetable juice are best.  Get plenty of rest and sleep  You may be more tired than usual for a while. It is important to get enough sleep at night. Its also important to rest during the day. Talk with your healthcare provider if coughing or other symptoms are interfering with your sleep.  Preventing the spread of germs  The best thing you can do to prevent spreading germs is to wash your hands often. You should:  · Rub your hand with soap and water for 20 to 30 seconds.  · Clean in between your fingers, the backs of your hands, and around your nails.  · Dry your hands on a separate towel or use paper towels.  You should also:  · Keep alcohol-based hand  nearby.  · Make sure you also clean surfaces that you touch. Use a product that kills all types of germs.  · Stay away from others until you are feeling better.  When to call your healthcare provider  Call your healthcare provider if you have any of the following:  · Symptoms get worse  · Fever continues  · Shortness of breath gets worse  · Increased mucus or mucus that is darker in color  · Coughing gets worse  · Lips or fingers are bluish in color  · Side effects from your medicine   Date Last Reviewed: 12/1/2016  © 2710-2359 [employer]. [address] [place] [address], [place] [address]. All rights reserved.  This information is not intended as a substitute for professional medical care. Always follow your healthcare professional's instructions.        Acute Bronchitis  Your healthcare provider has told you that you have acute bronchitis. Bronchitis is infection or inflammation of the bronchial tubes (airways in the lungs). Normally, air moves easily in and out of the airways. Bronchitis narrows the airways, making it harder for air to flow in and out of the lungs. This causes symptoms such as shortness of breath, coughing up yellow or green mucus, and wheezing. Bronchitis can be acute or chronic. Acute means the condition comes on quickly and goes away in a short time, usually within 3 to 10 days. Chronic means a condition lasts a long time and often comes back.    What causes acute bronchitis?  Acute bronchitis almost always starts as a viral respiratory infection, such as a cold or the flu. Certain factors make it more likely for a cold or flu to turn into bronchitis. These include being very young, being elderly, having a heart or lung problem, or having a weak immune system. Cigarette smoking also makes bronchitis more likely.  When bronchitis develops, the airways become swollen. The airways may also become infected with bacteria. This is known as a secondary infection.  Diagnosing acute bronchitis  Your healthcare provider will examine you and ask about your symptoms and health history. You may also have a sputum culture to test the fluid in your lungs. Chest X-rays may be done to look for infection in the lungs.  Treating acute bronchitis  Bronchitis usually clears up as the cold or flu goes away. You can help feel better faster by doing the following:  · Take medicine as directed. You may be told to take ibuprofen or other over-the-counter medicines. These help relieve inflammation in your bronchial tubes. Your healthcare provider may prescribe an inhaler to help open up the bronchial tubes. Most of the  time, acute bronchitis is caused by a viral infection. Antibiotics are usually not prescribed for viral infections.  · Drink plenty of fluids, such as water, juice, or warm soup. Fluids loosen mucus so that you can cough it up. This helps you breathe more easily. Fluids also prevent dehydration.  · Make sure you get plenty of rest.  · Do not smoke. Do not allow anyone else to smoke in your home.  Recovery and follow-up  Follow up with your doctor as you are told. You will likely feel better in a week or two. But a dry cough can linger beyond that time. Let your doctor know if you still have symptoms (other than a dry cough) after 2 weeks, or if youre prone to getting bronchial infections. Take steps to protect yourself from future infections. These steps include stopping smoking and avoiding tobacco smoke, washing your hands often, and getting a yearly flu shot.  When to call your healthcare provider  Call the healthcare provider if you have any of the following:  · Fever of 100.4°F (38.0°C) or higher, or as advised  · Symptoms that get worse, or new symptoms  · Trouble breathing  · Symptoms that dont start to improve within a week, or within 3 days of taking antibiotics   Date Last Reviewed: 12/1/2016  © 9661-9515 [employer]. [address] [place] [address], [place] [address]. All rights reserved. This information is not intended as a substitute for professional medical care. Always follow your healthcare professional's instructions.

## 2025-02-09 NOTE — ASSESSMENT & PLAN NOTE
- BP stable, improved since admission with ultrafiltration on dialysis treatment continue home dose of nifedipine  -Avoid hypotension

## 2025-02-09 NOTE — ASSESSMENT & PLAN NOTE
- reason for admission, status post CT upper extremity suggestive of fracture of greater tuberosity.  Follow-up's recommendation from orthopedics

## 2025-02-09 NOTE — OCCUPATIONAL THERAPY NOTE
OT Note     02/09/25 5582   Note Type   Note type Evaluation;Cancelled Session   Cancel Reasons Refusal   Additional Comments OT orders received and chart reviewed. Attempted to see pt for an OT eval but pt declined and reported not needing OT intervention at this time. Per pt, fall was an accident because of snow and ice and that he is still able to take care of his self care activities.  OT and pt aware of weight bearing restrictions to L UE and as per pt nursing will provide sling for his use. CURLY Maloney made aware of discussion with pt. Will follow as appropriate.   Licensure   [place] License Number  MaryRose E. Blanes, MS, OTR/L [id number]

## 2025-02-09 NOTE — ASSESSMENT & PLAN NOTE
In setting of missed dialysis recently  Potassium level 6.0-6.3 on admission  EKG without changes attributable to hyperkalemia  Status post calcium gluconate, insulin plus dextrose, Lokelma, albuterol  Potassium level improved to 5.1  Status post HD, 2/8

## 2025-02-09 NOTE — PROGRESS NOTES
Progress Note - Hospitalist   Name: Naresh Carpio 64 y.o. male I MRN: [id number]  Unit/Bed#: [address] [place] [address] Date of Admission: 2/8/2025   Date of Service: 2/9/2025 I Hospital Day: 0    Assessment & Plan  Hypervolemia  In setting of missed dialysis, missed once last week due to GI symptoms and this week on Thursday due to bad weather  Unable to go to HD today due to fall  Evidence of fluid overload with lower extremity swelling, abdominal distention.  Weight 107 kg compared to EDW 99 kg  Chest x-ray small right pleural effusion  2D echo 10/24-EF 60 to 65%, normal wall motion, grade 1 diastolic dysfunction.  PA pressure 30 mmHg  Volume status better after HD yesterday.  Still remains above EDW  Nephrology following  Continue HD as per nephrology  Fluid restriction  Monitor intake output  ? consideration of addition of diuretic on nondialysis days  Hyperkalemia (Resolved: 2/9/2025)  In setting of missed dialysis recently  Potassium level 6.0-6.3 on admission  EKG without changes attributable to hyperkalemia  Status post calcium gluconate, insulin plus dextrose, Lokelma, albuterol  Potassium level improved to 5.1  Status post HD, 2/8  ESRD (end stage renal disease) on dialysis (HCC)  ESRD on HD, TTS at [facility]  Nephrology following, input appreciated  Continue dialysis as per nephro  Falls  Mechanical fall, slipped on ice while getting into his car going to dialysis  Denies any head injury, syncope, loss of consciousness.  Fall precautions  PT/OT  Left shoulder pain  Status post mechanical fall, reports mild left shoulder pain at baseline but worsening pain after fall today  X-ray with possible greater tuberosity avulsion fracture versus calcific tendinitis  CT with minimally displaced fracture of anterior facet of the greater tuberosity, evidence of degenerative changes  Ortho eval  Monitor symptoms,   Pain control, Tylenol as needed  PT/OT  Chest tightness (Resolved: 2/9/2025)  Reported shortness of  breath and chest tightness, likely secondary to volume overload  Mild rales on exam  EKG without any ischemic changes.  Troponin 32-31-33.  Saturating adequately on room air  Denies any chest pain  Monitor symptoms with fluid removal  Paroxysmal atrial fibrillation (HCC)  EKG-sinus bradycardia/junctional bradycardia,?  Sick sinus syndrome  Patient denies dizziness or syncope  Telemetry with sinus rhythm/sinus bradycardia  On Eliquis but not been taking recently due to cost  Resume Eliquis  Case management follow up  Type II diabetes mellitus (HCC)  Lab Results   Component Value Date    HGBA1C 5.5 10/08/2024       Recent Labs     02/08/25  1213 02/08/25  1930 02/08/25 2003 02/09/25  0731   POCGLU 107 87 115 109       Blood Sugar Average: Last 72 hrs:  (P) 104.5  Not currently on any medication, reports good glycemic control  Controlled,  Diabetic diet  Will monitor blood glucose trend  Insulin sliding scale  Resume home gabapentin, renally adjusted to once a day  Hypertension  Blood pressure elevated on presentation in setting of volume overload  Improving  Continue home nifedipine  History of TIA (transient ischemic attack)  Continue aspirin, statin, Eliquis  Anemia in ESRD (end-stage renal disease)  ([facility])  Appears stable, monitor  Hyperphosphatemia  Continue sevelamer    VTE Pharmacologic Prophylaxis: VTE Score: 3 Moderate Risk (Score 3-4) - Pharmacological DVT Prophylaxis Ordered: apixaban (Eliquis).    Mobility:   Basic Mobility Inpatient Raw Score: 20  JH-HLM Goal: 6: Walk 10 steps or more  JH-HLM Achieved: 6: Walk 10 steps or more  JH-HLM Goal achieved. Continue to encourage appropriate mobility.    Patient Centered Rounds: I performed bedside rounds with nursing staff today.   Discussions with Specialists or Other Care Team Provider: Yes    Education and Discussions with Family / Patient:  Yes.     Current Length of Stay: 0 day(s)  Current Patient Status: Observation   Certification Statement: The patient  will continue to require additional inpatient hospital stay due to volume overload, left shoulder pain evaluation  Discharge Plan: Anticipate discharge in 24-48 hrs to home.    Code Status: Level 1 - Full Code    Subjective   Feels well except for ongoing anterior left shoulder pain, controlled with pain medication  Reports mainly pain with raising upper extremity  Breathing is better  Still with abdominal distention and lower extremity edema tolerating diet    Objective :  Temp:  [97 °F (36.1 °C)-98.5 °F (36.9 °C)] 98.1 °F (36.7 °C)  HR:  [36-74] 68  BP: (137-222)/() 137/63  Resp:  [17-22] 18  SpO2:  [89 %-100 %] 95 %  O2 Device: None (Room air)    Body mass index is 31.53 kg/m².     Input and Output Summary (last 24 hours):     Intake/Output Summary (Last 24 hours) at 2/9/2025 0940  Last data filed at 2/9/2025 0830  Gross per 24 hour   Intake 845 ml   Output 3600 ml   Net -2755 ml       Physical Exam  Constitutional:       General: He is not in acute distress.     Appearance: He is obese.   HENT:      Head: Normocephalic and atraumatic.   Cardiovascular:      Rate and Rhythm: Normal rate.      Comments: Right infraclavicular dialysis catheter  Pulmonary:      Effort: Pulmonary effort is normal. No respiratory distress.      Breath sounds: Decreased breath sounds present. No rhonchi.   Abdominal:      General: Bowel sounds are normal. There is no distension.      Palpations: Abdomen is soft.      Tenderness: There is no abdominal tenderness. There is no guarding or rebound.   Musculoskeletal:         General: Tenderness (Over left anterior shoulder) and deformity (Left toe amputation) present.      Right lower leg: Edema present.      Left lower leg: Edema present.      Comments: Bilateral lower extremity edema stasis congestion   Skin:     General: Skin is warm and dry.      Findings: No rash.   Neurological:      Mental Status: He is alert. Mental status is at baseline.      Cranial Nerves: No cranial nerve  deficit.           Lines/Drains:  Lines/Drains/Airways       Active Status       Name Placement date Placement time Site Days    HD Permanent Double Catheter --  --  Internal jugular  --                      Telemetry:  Telemetry Orders (From admission, onward)               24 Hour Telemetry Monitoring  Continuous x 24 Hours (Telem)        Expiring   Question:  Reason for 24 Hour Telemetry  Answer:  Arrhythmias requiring acute medical intervention / PPM or ICD malfunction                     Telemetry Reviewed: Normal Sinus Rhythm and Sinus Bradycardia  Indication for Continued Telemetry Use: No indication for continued use. Will discontinue.                Lab Results: I have reviewed the following results:   Results from last 7 days   Lab Units 02/09/25  0547 02/08/25  0906   WBC Thousand/uL 7.77 5.54   HEMOGLOBIN g/dL 10.2* 10.9*   HEMATOCRIT % 32.7* 35.6*   PLATELETS Thousands/uL 200 189   SEGS PCT %  --  62   LYMPHO PCT %  --  17   MONO PCT %  --  11   EOS PCT %  --  8*     Results from last 7 days   Lab Units 02/09/25  0547   SODIUM mmol/L 136   POTASSIUM mmol/L 4.2   CHLORIDE mmol/L 98   CO2 mmol/L 23   BUN mg/dL 57*   CREATININE mg/dL 7.06*   ANION GAP mmol/L 15*   CALCIUM mg/dL 8.6   ALBUMIN g/dL 3.8   TOTAL BILIRUBIN mg/dL 0.35   ALK PHOS U/L 150*   ALT U/L 45   AST U/L 27   GLUCOSE RANDOM mg/dL 120         Results from last 7 days   Lab Units 02/09/25  0731 02/08/25 2003 02/08/25  1930 02/08/25  1213   POC GLUCOSE mg/dl 109 115 87 107               Recent Cultures (last 7 days):         Imaging Results Review: I reviewed radiology reports from this admission including: chest xray.  Other Study Results Review: No additional pertinent studies reviewed.    Last 24 Hours Medication List:     Current Facility-Administered Medications:     acetaminophen (TYLENOL) tablet 650 mg, Q6H PRN    allopurinol (ZYLOPRIM) tablet 100 mg, Daily    apixaban (ELIQUIS) tablet 5 mg, BID    aspirin chewable tablet 81 mg,  Daily    atorvastatin (LIPITOR) tablet 80 mg, Daily    gabapentin (NEURONTIN) capsule 200 mg, HS    influenza vaccine, high-dose (Fluzone High-Dose) IM injection 0.5 mL, Once PRN    insulin lispro (HumALOG/ADMELOG) 100 units/mL subcutaneous injection 1-5 Units, TID AC **AND** Fingerstick Glucose (POCT), TID AC    insulin lispro (HumALOG/ADMELOG) 100 units/mL subcutaneous injection 1-5 Units, HS    lidocaine (LIDODERM) 5 % patch 1 patch, Daily    NIFEdipine (PROCARDIA XL) 24 hr tablet 30 mg, After Dinner    oxyCODONE (ROXICODONE) IR tablet 5 mg, Q8H PRN    sevelamer (RENAGEL) tablet 800 mg, TID With Meals    Administrative Statements   Today, Patient Was Seen By: Nedra Shetty MD  I have spent a total time of 35 minutes in caring for this patient on the day of the visit/encounter including Diagnostic results, Instructions for management, Patient and family education, Importance of tx compliance, Impressions, Documenting in the medical record, Reviewing / ordering tests, medicine, procedures  , Obtaining or reviewing history  , and Communicating with other healthcare professionals .    **Please Note: This note may have been constructed using a voice recognition system.**

## 2025-02-09 NOTE — ASSESSMENT & PLAN NOTE
In setting of missed dialysis, missed once last week due to GI symptoms and this week on Thursday due to bad weather  Unable to go to HD today due to fall  Evidence of fluid overload with lower extremity swelling, abdominal distention.  Weight 107 kg compared to EDW 99 kg  Chest x-ray small right pleural effusion  2D echo 10/24-EF 60 to 65%, normal wall motion, grade 1 diastolic dysfunction.  PA pressure 30 mmHg  Volume status better after HD yesterday.  Still remains above EDW  Nephrology following  Continue HD as per nephrology  Fluid restriction  Monitor intake output  ? consideration of addition of diuretic on nondialysis days

## 2025-02-10 PROBLEM — S42.202D CLOSED FRACTURE OF PROXIMAL END OF LEFT HUMERUS WITH ROUTINE HEALING: Status: ACTIVE | Noted: 2025-02-10

## 2025-02-10 LAB
ANION GAP SERPL CALCULATED.3IONS-SCNC: 17 MMOL/L (ref 4–13)
BUN SERPL-MCNC: 72 MG/DL (ref 5–25)
CALCIUM SERPL-MCNC: 8.1 MG/DL (ref 8.4–10.2)
CHLORIDE SERPL-SCNC: 97 MMOL/L (ref 96–108)
CO2 SERPL-SCNC: 20 MMOL/L (ref 21–32)
CREAT SERPL-MCNC: 8.51 MG/DL (ref 0.6–1.3)
DME PARACHUTE DELIVERY DATE REQUESTED: NORMAL
DME PARACHUTE DELIVERY NOTE: NORMAL
DME PARACHUTE ITEM DESCRIPTION: NORMAL
DME PARACHUTE ORDER STATUS: NORMAL
DME PARACHUTE SUPPLIER NAME: NORMAL
DME PARACHUTE SUPPLIER PHONE: NORMAL
ERYTHROCYTE [DISTWIDTH] IN BLOOD BY AUTOMATED COUNT: 15.7 % (ref 11.6–15.1)
GFR SERPL CREATININE-BSD FRML MDRD: 5 ML/MIN/1.73SQ M
GLUCOSE SERPL-MCNC: 110 MG/DL (ref 65–140)
GLUCOSE SERPL-MCNC: 110 MG/DL (ref 65–140)
GLUCOSE SERPL-MCNC: 133 MG/DL (ref 65–140)
GLUCOSE SERPL-MCNC: 140 MG/DL (ref 65–140)
GLUCOSE SERPL-MCNC: 174 MG/DL (ref 65–140)
HCT VFR BLD AUTO: 30.1 % (ref 36.5–49.3)
HGB BLD-MCNC: 9.4 G/DL (ref 12–17)
MCH RBC QN AUTO: 28.5 PG (ref 26.8–34.3)
MCHC RBC AUTO-ENTMCNC: 31.2 G/DL (ref 31.4–37.4)
MCV RBC AUTO: 91 FL (ref 82–98)
PLATELET # BLD AUTO: 176 THOUSANDS/UL (ref 149–390)
PMV BLD AUTO: 10.6 FL (ref 8.9–12.7)
POTASSIUM SERPL-SCNC: 4.8 MMOL/L (ref 3.5–5.3)
RBC # BLD AUTO: 3.3 MILLION/UL (ref 3.88–5.62)
SODIUM SERPL-SCNC: 134 MMOL/L (ref 135–147)
WBC # BLD AUTO: 7.44 THOUSAND/UL (ref 4.31–10.16)

## 2025-02-10 PROCEDURE — 97166 OT EVAL MOD COMPLEX 45 MIN: CPT

## 2025-02-10 PROCEDURE — 97163 PT EVAL HIGH COMPLEX 45 MIN: CPT

## 2025-02-10 PROCEDURE — 85027 COMPLETE CBC AUTOMATED: CPT | Performed by: INTERNAL MEDICINE

## 2025-02-10 PROCEDURE — 99222 1ST HOSP IP/OBS MODERATE 55: CPT

## 2025-02-10 PROCEDURE — 80048 BASIC METABOLIC PNL TOTAL CA: CPT | Performed by: INTERNAL MEDICINE

## 2025-02-10 PROCEDURE — 99232 SBSQ HOSP IP/OBS MODERATE 35: CPT | Performed by: INTERNAL MEDICINE

## 2025-02-10 PROCEDURE — 82948 REAGENT STRIP/BLOOD GLUCOSE: CPT

## 2025-02-10 PROCEDURE — 2W3BXYZ IMMOBILIZATION OF LEFT UPPER ARM USING OTHER DEVICE: ICD-10-PCS | Performed by: ORTHOPAEDIC SURGERY

## 2025-02-10 RX ORDER — OXYCODONE HYDROCHLORIDE 5 MG/1
5 TABLET ORAL EVERY 4 HOURS PRN
Refills: 0 | Status: DISCONTINUED | OUTPATIENT
Start: 2025-02-10 | End: 2025-02-11 | Stop reason: HOSPADM

## 2025-02-10 RX ORDER — ACETAMINOPHEN 325 MG/1
650 TABLET ORAL EVERY 6 HOURS SCHEDULED
Status: DISCONTINUED | OUTPATIENT
Start: 2025-02-10 | End: 2025-02-11 | Stop reason: HOSPADM

## 2025-02-10 RX ORDER — ACETAMINOPHEN 325 MG/1
325 TABLET ORAL EVERY 6 HOURS PRN
Status: DISCONTINUED | OUTPATIENT
Start: 2025-02-10 | End: 2025-02-11 | Stop reason: HOSPADM

## 2025-02-10 RX ORDER — DOCUSATE SODIUM 100 MG/1
100 CAPSULE, LIQUID FILLED ORAL 2 TIMES DAILY
Status: DISCONTINUED | OUTPATIENT
Start: 2025-02-10 | End: 2025-02-11 | Stop reason: HOSPADM

## 2025-02-10 RX ORDER — POLYETHYLENE GLYCOL 3350 17 G/17G
17 POWDER, FOR SOLUTION ORAL DAILY PRN
Status: DISCONTINUED | OUTPATIENT
Start: 2025-02-10 | End: 2025-02-11 | Stop reason: HOSPADM

## 2025-02-10 RX ADMIN — APIXABAN 5 MG: 5 TABLET, FILM COATED ORAL at 08:04

## 2025-02-10 RX ADMIN — OXYCODONE HYDROCHLORIDE 5 MG: 5 TABLET ORAL at 08:01

## 2025-02-10 RX ADMIN — GABAPENTIN 200 MG: 100 CAPSULE ORAL at 17:11

## 2025-02-10 RX ADMIN — OXYCODONE HYDROCHLORIDE 5 MG: 5 TABLET ORAL at 18:33

## 2025-02-10 RX ADMIN — ALLOPURINOL 100 MG: 100 TABLET ORAL at 08:04

## 2025-02-10 RX ADMIN — SEVELAMER HYDROCHLORIDE 800 MG: 800 TABLET, FILM COATED ORAL at 17:10

## 2025-02-10 RX ADMIN — DOCUSATE SODIUM 100 MG: 100 CAPSULE, LIQUID FILLED ORAL at 12:11

## 2025-02-10 RX ADMIN — LIDOCAINE 5% 1 PATCH: 700 PATCH TOPICAL at 08:04

## 2025-02-10 RX ADMIN — ACETAMINOPHEN 650 MG: 325 TABLET ORAL at 12:11

## 2025-02-10 RX ADMIN — SEVELAMER HYDROCHLORIDE 800 MG: 800 TABLET, FILM COATED ORAL at 12:12

## 2025-02-10 RX ADMIN — ATORVASTATIN CALCIUM 80 MG: 80 TABLET, FILM COATED ORAL at 08:04

## 2025-02-10 RX ADMIN — NIFEDIPINE 30 MG: 30 TABLET, EXTENDED RELEASE ORAL at 17:10

## 2025-02-10 RX ADMIN — INSULIN LISPRO 1 UNITS: 100 INJECTION, SOLUTION INTRAVENOUS; SUBCUTANEOUS at 12:13

## 2025-02-10 RX ADMIN — ACETAMINOPHEN 650 MG: 325 TABLET ORAL at 17:10

## 2025-02-10 RX ADMIN — APIXABAN 5 MG: 5 TABLET, FILM COATED ORAL at 17:11

## 2025-02-10 RX ADMIN — OXYCODONE HYDROCHLORIDE 5 MG: 5 TABLET ORAL at 22:44

## 2025-02-10 RX ADMIN — ASPIRIN 81 MG CHEWABLE TABLET 81 MG: 81 TABLET CHEWABLE at 08:04

## 2025-02-10 RX ADMIN — ACETAMINOPHEN 650 MG: 325 TABLET ORAL at 23:54

## 2025-02-10 RX ADMIN — SEVELAMER HYDROCHLORIDE 800 MG: 800 TABLET, FILM COATED ORAL at 08:04

## 2025-02-10 NOTE — PROGRESS NOTES
NEPHROLOGY HOSPITAL PROGRESS NOTE   Naresh Carpio 64 y.o. male MRN: [id number]  Unit/Bed#: [address] [place] [address] Encounter: [id number]  Reason for Consult: ESRD on HD    Assessment & Plan  ESRD (end stage renal disease) on dialysis ([facility])  TTS at [facility]  Last outpatient HD: 02/04  Last inpatient HD: 02/08  EDW 99 kg  Access: Permacath  Volume status stable today  Next hemodialysis session will be tomorrow  Hypervolemia  Patient presented with lower extremity edema and shortness of breath and was significantly above EDW  Continue ultrafiltration on dialysis  Hyperphosphatemia  Continue Renvela 1 tablet 3 times daily with meals  Hypertension  Continue nifedipine and ultrafiltration on dialysis  Anemia in ESRD (end-stage renal disease)  ([facility])  Hemoglobin today 9.4  Not on SURINDER as outpatient  Type II diabetes mellitus ([facility])  Management per primary team  Falls  Status post CT upper extremity suggestive of fracture of greater tuberosity of humerus, follow-up with orthopedics  Paroxysmal atrial fibrillation ([facility])  Management per primary team    Discussed with internal medicine team.  After discussion, we agreed that patient is stable for interdialytic timeframe and next hemodialysis session will be tomorrow.    SUBJECTIVE / 24H INTERVAL HISTORY:  Dyspnea has improved.  Continues to have leg swelling.    OBJECTIVE:  Current Weight: Weight - Scale: 107 kg (236 lb 11.2 oz)  Vitals:    02/09/25 2234 02/09/25 2300 02/10/25 0536 02/10/25 0803   BP: 152/62   151/65   BP Location: Left arm      Pulse: 70      Resp: 21      Temp: 99.1 °F (37.3 °C)   99.1 °F (37.3 °C)   TempSrc: Oral      SpO2: 93% 92%     Weight:   107 kg (236 lb 11.2 oz)    Height:           Intake/Output Summary (Last 24 hours) at 2/10/2025 0833  Last data filed at 2/9/2025 1801  Gross per 24 hour   Intake 357 ml   Output --   Net 357 ml     Review of Systems   Constitutional:  Negative for chills and fever.   HENT:  Negative for ear pain and sore throat.     Eyes:  Negative for pain and visual disturbance.   Respiratory:  Negative for cough and shortness of breath.    Cardiovascular:  Positive for leg swelling. Negative for chest pain and palpitations.   Gastrointestinal:  Negative for abdominal pain and vomiting.   Genitourinary:  Negative for dysuria and hematuria.   Musculoskeletal:  Negative for arthralgias and back pain.   Skin:  Negative for color change and rash.   Neurological:  Negative for seizures and syncope.   All other systems reviewed and are negative.    Physical Exam  Vitals and nursing note reviewed.   Constitutional:       General: He is not in acute distress.     Appearance: He is well-developed.   HENT:      Head: Normocephalic and atraumatic.   Eyes:      Conjunctiva/sclera: Conjunctivae normal.   Cardiovascular:      Rate and Rhythm: Normal rate and regular rhythm.      Pulses: Normal pulses.      Heart sounds: Normal heart sounds. No murmur heard.     Comments: Right chest wall permacath present  Pulmonary:      Effort: Pulmonary effort is normal. No respiratory distress.      Breath sounds: Normal breath sounds.   Abdominal:      Palpations: Abdomen is soft.      Tenderness: There is no abdominal tenderness.   Musculoskeletal:         General: No swelling.      Cervical back: Neck supple.      Right lower leg: Edema present.      Left lower leg: Edema present.   Skin:     General: Skin is warm and dry.      Capillary Refill: Capillary refill takes less than 2 seconds.   Neurological:      Mental Status: He is alert.   Psychiatric:         Mood and Affect: Mood normal.       Medications:    Current Facility-Administered Medications:     acetaminophen (TYLENOL) tablet 650 mg, 650 mg, Oral, Q6H PRN, Nedra Shetty MD, 650 mg at 02/08/25 2052    allopurinol (ZYLOPRIM) tablet 100 mg, 100 mg, Oral, Daily, Nedra Shetty MD, 100 mg at 02/10/25 0804    apixaban (ELIQUIS) tablet 5 mg, 5 mg, Oral, BID, Nedra Shetty MD, 5 mg at 02/10/25 0804     "aspirin chewable tablet 81 mg, 81 mg, Oral, Daily, Nedra Shetty MD, 81 mg at 02/10/25 0804    atorvastatin (LIPITOR) tablet 80 mg, 80 mg, Oral, Daily, Nedra Shetty MD, 80 mg at 02/10/25 0804    gabapentin (NEURONTIN) capsule 200 mg, 200 mg, Oral, QPM, Nedra Shetty MD, 200 mg at 02/09/25 1729    influenza vaccine, high-dose (Fluzone High-Dose) IM injection 0.5 mL, 0.5 mL, Intramuscular, Once PRN, Nedra Shetty MD    insulin lispro (HumALOG/ADMELOG) 100 units/mL subcutaneous injection 1-5 Units, 1-5 Units, Subcutaneous, TID AC **AND** Fingerstick Glucose (POCT), , , TID AC, Nedra Shetty MD    insulin lispro (HumALOG/ADMELOG) 100 units/mL subcutaneous injection 1-5 Units, 1-5 Units, Subcutaneous, HS, Nedra Shetty MD, 1 Units at 02/09/25 2246    lidocaine (LIDODERM) 5 % patch 1 patch, 1 patch, Topical, Daily, Isabella Webster MD, 1 patch at 02/10/25 0804    NIFEdipine (PROCARDIA XL) 24 hr tablet 30 mg, 30 mg, Oral, After Dinner, Nedra Shetty MD, 30 mg at 02/09/25 1724    oxyCODONE (ROXICODONE) IR tablet 5 mg, 5 mg, Oral, Q8H PRN, Isabella Webster MD, 5 mg at 02/10/25 0801    sevelamer (RENAGEL) tablet 800 mg, 800 mg, Oral, TID With Meals, Kalli Mcbride PA-C, 800 mg at 02/10/25 0804    Laboratory Results:  Results from last 7 days   Lab Units 02/10/25  0448 02/09/25  0547 02/08/25  1520 02/08/25  1116 02/08/25  0906   WBC Thousand/uL 7.44 7.77  --   --  5.54   HEMOGLOBIN g/dL 9.4* 10.2*  --   --  10.9*   HEMATOCRIT % 30.1* 32.7*  --   --  35.6*   PLATELETS Thousands/uL 176 200  --   --  189   POTASSIUM mmol/L 4.8 4.2 5.1 6.3* 6.0*   CHLORIDE mmol/L 97 98  --   --  101   CO2 mmol/L 20* 23  --   --  21   BUN mg/dL 72* 57*  --   --  89*   CREATININE mg/dL 8.51* 7.06*  --   --  9.78*   CALCIUM mg/dL 8.1* 8.6  --   --  8.6       Portions of the record may have been created with voice recognition software. Occasional wrong word or \"sound a like\" substitutions may have occurred due to the inherent " limitations of voice recognition software. Read the chart carefully and recognize, using context, where substitutions have occurred.If you have any questions, please contact the dictating provider.

## 2025-02-10 NOTE — ASSESSMENT & PLAN NOTE
Lab Results   Component Value Date    HGBA1C 5.7 (H) 02/09/2025       Recent Labs     02/09/25  1129 02/09/25  1613 02/09/25  2013 02/10/25  0714   POCGLU 139 149* 173* 133       Blood Sugar Average: Last 72 hrs:  (P) 126.5  Not currently on any medication, reports good glycemic control  Controlled,  Diabetic diet  Will monitor blood glucose trend  Insulin sliding scale  Continue home gabapentin, renally adjusted to once a day

## 2025-02-10 NOTE — PLAN OF CARE
Problem: Potential for Falls  Goal: Patient will remain free of falls  Description: INTERVENTIONS:  - Educate patient/family on patient safety including physical limitations  - Instruct patient to call for assistance with activity   - Consult OT/PT to assist with strengthening/mobility   - Keep Call bell within reach  - Keep bed low and locked with side rails adjusted as appropriate  - Keep care items and personal belongings within reach  - Initiate and maintain comfort rounds  - Make Fall Risk Sign visible to staff  - Offer Toileting every 2 Hours, in advance of need  - Initiate/Maintain bed alarm  - Obtain necessary fall risk management equipment: fall risk sign on door  - Apply yellow socks and bracelet for high fall risk patients  - Consider moving patient to room near nurses station  Outcome: Progressing     Problem: PAIN - ADULT  Goal: Verbalizes/displays adequate comfort level or baseline comfort level  Description: Interventions:  - Encourage patient to monitor pain and request assistance  - Assess pain using appropriate pain scale  - Administer analgesics based on type and severity of pain and evaluate response  - Implement non-pharmacological measures as appropriate and evaluate response  - Consider cultural and social influences on pain and pain management  - Notify physician/advanced practitioner if interventions unsuccessful or patient reports new pain  Outcome: Progressing     Problem: INFECTION - ADULT  Goal: Absence or prevention of progression during hospitalization  Description: INTERVENTIONS:  - Assess and monitor for signs and symptoms of infection  - Monitor lab/diagnostic results  - Monitor all insertion sites, i.e. indwelling lines, tubes, and drains  - Monitor endotracheal if appropriate and nasal secretions for changes in amount and color  - [place] appropriate cooling/warming therapies per order  - Administer medications as ordered  - Instruct and encourage patient and family to use  good hand hygiene technique  - Identify and instruct in appropriate isolation precautions for identified infection/condition  Outcome: Progressing  Goal: Absence of fever/infection during neutropenic period  Description: INTERVENTIONS:  - Monitor WBC    Outcome: Progressing     Problem: SAFETY ADULT  Goal: Patient will remain free of falls  Description: INTERVENTIONS:  - Educate patient/family on patient safety including physical limitations  - Instruct patient to call for assistance with activity   - Consult OT/PT to assist with strengthening/mobility   - Keep Call bell within reach  - Keep bed low and locked with side rails adjusted as appropriate  - Keep care items and personal belongings within reach  - Initiate and maintain comfort rounds  - Make Fall Risk Sign visible to staff  - Offer Toileting every 2 Hours, in advance of need  - Initiate/Maintain bed alarm  - Obtain necessary fall risk management equipment: fall risk sign on door  - Apply yellow socks and bracelet for high fall risk patients  - Consider moving patient to room near nurses station  Outcome: Progressing  Goal: Maintain or return to baseline ADL function  Description: INTERVENTIONS:  -  Assess patient's ability to carry out ADLs; assess patient's baseline for ADL function and identify physical deficits which impact ability to perform ADLs (bathing, care of mouth/teeth, toileting, grooming, dressing, etc.)  - Assess/evaluate cause of self-care deficits   - Assess range of motion  - Assess patient's mobility; develop plan if impaired  - Assess patient's need for assistive devices and provide as appropriate  - Encourage maximum independence but intervene and supervise when necessary  - Involve family in performance of ADLs  - Assess for home care needs following discharge   - Consider OT consult to assist with ADL evaluation and planning for discharge  - Provide patient education as appropriate  Outcome: Progressing  Goal: Maintains/Returns to pre  admission functional level  Description: INTERVENTIONS:  - Perform AM-PAC 6 Click Basic Mobility/ Daily Activity assessment daily.  - Set and communicate daily mobility goal to care team and patient/family/caregiver.   - Collaborate with rehabilitation services on mobility goals if consulted  - Perform Range of Motion 2 times a day.  - Reposition patient every 2 hours.  - Dangle patient 2 times a day  - Stand patient 2 times a day  - Ambulate patient 3 times a day  - Out of bed to chair 3 times a day   - Out of bed for meals 3 times a day  - Out of bed for toileting  - Record patient progress and toleration of activity level   Outcome: Progressing     Problem: DISCHARGE PLANNING  Goal: Discharge to home or other facility with appropriate resources  Description: INTERVENTIONS:  - Identify barriers to discharge w/patient and caregiver  - Arrange for needed discharge resources and transportation as appropriate  - Identify discharge learning needs (meds, wound care, etc.)  - Arrange for interpretive services to assist at discharge as needed  - Refer to Case Management Department for coordinating discharge planning if the patient needs post-hospital services based on physician/advanced practitioner order or complex needs related to functional status, cognitive ability, or social support system  Outcome: Progressing     Problem: Knowledge Deficit  Goal: Patient/family/caregiver demonstrates understanding of disease process, treatment plan, medications, and discharge instructions  Description: Complete learning assessment and assess knowledge base.  Interventions:  - Provide teaching at level of understanding  - Provide teaching via preferred learning methods  Outcome: Progressing     Problem: CARDIOVASCULAR - ADULT  Goal: Maintains optimal cardiac output and hemodynamic stability  Description: INTERVENTIONS:  - Monitor I/O, vital signs and rhythm  - Monitor for S/S and trends of decreased cardiac output  - Administer and  titrate ordered vasoactive medications to optimize hemodynamic stability  - Assess quality of pulses, skin color and temperature  - Assess for signs of decreased coronary artery perfusion  - Instruct patient to report change in severity of symptoms  Outcome: Progressing  Goal: Absence of cardiac dysrhythmias or at baseline rhythm  Description: INTERVENTIONS:  - Continuous cardiac monitoring, vital signs, obtain 12 lead EKG if ordered  - Administer antiarrhythmic and heart rate control medications as ordered  - Monitor electrolytes and administer replacement therapy as ordered  Outcome: Progressing     Problem: RESPIRATORY - ADULT  Goal: Achieves optimal ventilation and oxygenation  Description: INTERVENTIONS:  - Assess for changes in respiratory status  - Assess for changes in mentation and behavior  - Position to facilitate oxygenation and minimize respiratory effort  - Oxygen administered by appropriate delivery if ordered  - Initiate smoking cessation education as indicated  - Encourage broncho-pulmonary hygiene including cough, deep breathe, Incentive Spirometry  - Assess the need for suctioning and aspirate as needed  - Assess and instruct to report SOB or any respiratory difficulty  - Respiratory Therapy support as indicated  Outcome: Progressing     Problem: GENITOURINARY - ADULT  Goal: Maintains or returns to baseline urinary function  Description: INTERVENTIONS:  - Assess urinary function  - Encourage oral fluids to ensure adequate hydration if ordered  - Administer IV fluids as ordered to ensure adequate hydration  - Administer ordered medications as needed  - Offer frequent toileting  - Follow urinary retention protocol if ordered  Outcome: Progressing     Problem: METABOLIC, FLUID AND ELECTROLYTES - ADULT  Goal: Electrolytes maintained within normal limits  Description: INTERVENTIONS:  - Monitor labs and assess patient for signs and symptoms of electrolyte imbalances  - Administer electrolyte replacement  as ordered  - Monitor response to electrolyte replacements, including repeat lab results as appropriate  - Instruct patient on fluid and nutrition as appropriate  Outcome: Progressing  Goal: Fluid balance maintained  Description: INTERVENTIONS:  - Monitor labs   - Monitor I/O and WT  - Instruct patient on fluid and nutrition as appropriate  - Assess for signs & symptoms of volume excess or deficit  Outcome: Progressing  Goal: Glucose maintained within target range  Description: INTERVENTIONS:  - Monitor Blood Glucose as ordered  - Assess for signs and symptoms of hyperglycemia and hypoglycemia  - Administer ordered medications to maintain glucose within target range  - Assess nutritional intake and initiate nutrition service referral as needed  Outcome: Progressing     Problem: HEMATOLOGIC - ADULT  Goal: Maintains hematologic stability  Description: INTERVENTIONS  - Assess for signs and symptoms of bleeding or hemorrhage  - Monitor labs  - Administer supportive blood products/factors as ordered and appropriate  Outcome: Progressing     Problem: MUSCULOSKELETAL - ADULT  Goal: Maintain or return mobility to safest level of function  Description: INTERVENTIONS:  - Assess patient's ability to carry out ADLs; assess patient's baseline for ADL function and identify physical deficits which impact ability to perform ADLs (bathing, care of mouth/teeth, toileting, grooming, dressing, etc.)  - Assess/evaluate cause of self-care deficits   - Assess range of motion  - Assess patient's mobility  - Assess patient's need for assistive devices and provide as appropriate  - Encourage maximum independence but intervene and supervise when necessary  - Involve family in performance of ADLs  - Assess for home care needs following discharge   - Consider OT consult to assist with ADL evaluation and planning for discharge  - Provide patient education as appropriate  Outcome: Progressing

## 2025-02-10 NOTE — ASSESSMENT & PLAN NOTE
Lab Results   Component Value Date    HGBA1C 5.7 (H) 02/09/2025       Recent Labs     02/09/25  1129 02/09/25  1613 02/09/25  2013 02/10/25  0714   POCGLU 139 149* 173* 133       Blood Sugar Average: Last 72 hrs:  (P) 126.5

## 2025-02-10 NOTE — ASSESSMENT & PLAN NOTE
Patient presented with lower extremity edema and shortness of breath and was significantly above EDW  Continue ultrafiltration on dialysis

## 2025-02-10 NOTE — ASSESSMENT & PLAN NOTE
Status post mechanical fall, reports mild left shoulder pain at baseline but worsening pain after fall today  X-ray with possible greater tuberosity avulsion fracture versus calcific tendinitis  CT with minimally displaced fracture of anterior facet of the greater tuberosity, evidence of degenerative changes  Ortho [id number] recommendations  Monitor symptoms,   Pain control, Tylenol as needed  PT/OT

## 2025-02-10 NOTE — PLAN OF CARE
Problem: PHYSICAL THERAPY ADULT  Goal: Performs mobility at highest level of function for planned discharge setting.  See evaluation for individualized goals.  Description: Treatment/Interventions: Functional transfer training, LE strengthening/ROM, ADL retraining, Endurance training, Therapeutic exercise, Bed mobility, Gait training, Spoke to nursing, OT, Spoke to case management  Equipment Recommended: Cane (Quad cane ; pt reports he would feel more steady with increased support of quad cane compared to straight cane utilized during session)       See flowsheet documentation for full assessment, interventions and recommendations.  Note: Prognosis: Good  Problem List: Decreased endurance, Impaired balance, Decreased mobility, Impaired judgement, Decreased safety awareness, Pain, Orthopedic restrictions, Decreased skin integrity  Assessment: Patient seen for Physical Therapy evaluation. Patient admitted with ESRD (end stage renal disease) on dialysis ([facility]).  Comorbidities affecting patient's physical performance include: Afib, amputation, anemia, arthritis, cardiac disease, CHF, CKD, diabetes, ESRD on hemodialysis, hypertension, neuropathy, osteoarthritis, PAD, and TIA.  Personal factors affecting patient at time of initial evaluation include: lives in 1 story house, ambulating with assistive device, stairs to enter home, inability to navigate community distances, inability to navigate level surfaces without external assistance, limited home support, positive fall history, inability to perform ADLS, and inability to perform IADLS . Prior to admission, patient was independent with functional mobility with walker, independent with ADLS, independent with IADLS, living alone in 1 story home with 3-9 steps to enter, ambulating household distance, and ambulating community distances.  Please find objective findings from Physical Therapy assessment regarding body systems outlined above with impairments and limitations  including impaired balance, decreased endurance, gait deviations, pain, decreased activity tolerance, decreased functional mobility tolerance, decreased safety awareness, impaired judgement, fall risk, and orthopedic restrictions.  The Barthel Index was used as a functional outcome tool presenting with a score of Barthel Index Score: 45 today indicating marked limitations of functional mobility and ADLS.  Patient's clinical presentation is currently unstable/unpredictable as seen in patient's presentation of changing level of pain, increased fall risk, and decreased endurance. Pt would benefit from continued Physical Therapy treatment to address deficits as defined above and maximize level of functional mobility. As demonstrated by objective findings, the assigned level of complexity for this evaluation is high.The patient's AM-[facility] Basic Mobility Inpatient Short Form Raw Score is 17. A Raw score of greater than 16 suggests the patient may benefit from discharge to home, however pt NWB LUE, increased falls risk and would benefit from continued therapy services. Please also refer to the recommendation of the Physical Therapist for safe discharge planning.        Rehab Resource Intensity Level, PT: II (Moderate Resource Intensity)    See flowsheet documentation for full assessment.

## 2025-02-10 NOTE — CASE MANAGEMENT
Case Management Discharge Planning Note    Patient name Naresh Carpio  Location [address] [address]/[address] [address]-* MRN [id number]  : 1960 Date 2/10/2025       Current Admission Date: 2025  Current Admission Diagnosis:ESRD (end stage renal disease) on dialysis ([facility])   Patient Active Problem List    Diagnosis Date Noted Date Diagnosed    Closed fracture of proximal end of left humerus with routine healing 02/10/2025     Left shoulder pain 2025     ESRD (end stage renal disease) on dialysis ([facility]) 2025     Primary hypertension 2025     Anemia in ESRD (end-stage renal disease)  ([facility]) 2025     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, on chronic dialysis ([facility]) 2025     Renal lesion 2024     Chronic wound 2024     Acute on chronic diastolic (congestive) heart failure ([facility]) 2024     Pulmonary edema 2024     Pleural effusion 2024     Elevated troponin 2024     Hyperphosphatemia 2024     Fall 2024     Melena 2024     Paroxysmal atrial fibrillation ([facility])      ESRD (end stage renal disease) ([facility]) 10/25/2024     Nausea 10/20/2024     Hyponatremia 10/19/2024     Chronic kidney disease-mineral and bone disorder 10/14/2024     Falls 10/08/2024     PAD (peripheral artery disease) ([facility]) 10/08/2024     Closed fracture of right pelvis ([facility]) 2024     Bilateral sacral fracture, closed ([facility]) 2024     Difficulty with speech 2024     History of amputation of hallux ([facility]) 2024     Hypertensive urgency 2024     Facial cellulitis 2024     Constipation 2023     Cellulitis of right foot      Polymicrobial bacterial infection 2023     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 mellitus, with necrosis of bone ([facility])      Acquired deformity of foot, right      Charcot's joint      Subacute osteomyelitis of right foot ([facility])      Open wound of  right foot 08/21/2023     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 mellitus, with bone involvement without evidence of necrosis (HCC)      Diabetic ulcer of left midfoot associated with type 2 diabetes mellitus, limited to breakdown of skin (HCC)      Diabetic polyneuropathy associated with type 2 diabetes mellitus (HCC)      Diabetes mellitus type 2 with peripheral artery disease (HCC)      History of amputation of lesser toe of left foot (HCC)      Onychomycosis      Status post fall 08/19/2023     Traumatic rhabdomyolysis (HCC) 08/19/2023     Leukocytosis 08/19/2023     Osteoarthritis of left hip 07/27/2022     Severe Left Orchalgia 07/26/2022     Right shoulder pain 07/26/2022     Left hip pain 07/06/2022     Hemodialysis status (HCC)      Hypervolemia      Diarrhea 01/22/2022     Anemia due to chronic kidney disease, on chronic dialysis (HCC) 01/21/2022     Type II diabetes mellitus (HCC)      Hypertension      History of TIA (transient ischemic attack)      T10 vertebral fracture (HCC)        LOS (days): 1  Geometric Mean LOS (GMLOS) (days): 4.4  Days to GMLOS:3.2     OBJECTIVE:  Risk of Unplanned Readmission Score: 47.73         Current admission status: Inpatient   Preferred Pharmacy:   [facility] [address] [place] [id number]  Phone: [phone] Fax: [phone]    Primary Care Provider: Jeffrey Jackson    Primary Insurance: MEDICARE  Secondary Insurance:     DISCHARGE DETAILS:     DME Referral Provided  Referral made for DME?: Yes (Narrow base quad cane ordered from [employer] in [place] per PT recommendation, CM requested co-pay information to discuss with patient)  DME referral completed for the following items:: Straight Cane  DME Supplier Name:: [employer]    Other Referral/Resources/Interventions Provided:  Interventions: DME

## 2025-02-10 NOTE — PROGRESS NOTES
Progress Note - Hospitalist   Name: Naresh Carpio 64 y.o. male I MRN: [id number]  Unit/Bed#: [address] [place] [address] Date of Admission: 2/8/2025   Date of Service: 2/10/2025 I Hospital Day: 1    Assessment & Plan  Hypervolemia  In setting of missed dialysis, missed once last week due to GI symptoms and this week on Thursday due to bad weather  Unable to go to HD today due to fall  Evidence of fluid overload with lower extremity swelling, abdominal distention.  Weight 107 kg compared to EDW 99 kg  Chest x-ray small right pleural effusion  2D echo 10/24-EF 60 to 65%, normal wall motion, grade 1 diastolic dysfunction.  PA pressure 30 mmHg  Volume status better after HD on 2/8.  Still remains above EDW.  Likely will miss dialysis tomorrow if discharged due to weather  Nephrology following  Continue HD as per nephrology, plan for HD in a.m.  Fluid restriction  Monitor intake output  ? consideration of addition of diuretic on nondialysis days  ESRD (end stage renal disease) on dialysis (HCC)  ESRD on HD, TTS at [facility]  Nephrology following, input appreciated  Continue dialysis as per nephro  Falls  Mechanical fall, slipped on ice while getting into his car going to dialysis  Denies any head injury, syncope, loss of consciousness.  Fall precautions  PT/OT  Left shoulder pain  Status post mechanical fall, reports mild left shoulder pain at baseline but worsening pain after fall today  X-ray with possible greater tuberosity avulsion fracture versus calcific tendinitis  CT with minimally displaced fracture of anterior facet of the greater tuberosity, evidence of degenerative changes  Ortho [id number] recommendations  Monitor symptoms,   Pain control, Tylenol as needed  PT/OT  Closed fracture of proximal end of left humerus with routine healing  Ortho [id number] recommendations  Nonweightbearing, sling in place  PT/OT  Paroxysmal atrial fibrillation (HCC)  EKG-sinus bradycardia/junctional bradycardia,?   Sick sinus syndrome  Patient denies dizziness or syncope  Telemetry with sinus rhythm/sinus bradycardia  History of A-fib/a flutter, does not remember his cardiologist.  Appears to have received care at [facility]  On Eliquis but not been taking recently due to cost  Resume Eliquis, will send to pharmacy for price check  Obtain records from [facility] regarding cardiology workup  PT/OT-fall precautions  Case management follow up  Type II diabetes mellitus (HCC)  Lab Results   Component Value Date    HGBA1C 5.7 (H) 02/09/2025       Recent Labs     02/09/25  1129 02/09/25  1613 02/09/25  2013 02/10/25  0714   POCGLU 139 149* 173* 133       Blood Sugar Average: Last 72 hrs:  (P) 126.5  Not currently on any medication, reports good glycemic control  Controlled,  Diabetic diet  Will monitor blood glucose trend  Insulin sliding scale  Continue home gabapentin, renally adjusted to once a day  Hypertension  Blood pressure elevated on presentation in setting of volume overload  Improving  Continue home nifedipine  History of TIA (transient ischemic attack)  Continue aspirin, statin, Eliquis  Anemia in ESRD (end-stage renal disease)  ([facility])  Appears stable, monitor  Hyperphosphatemia  Continue sevelamer    VTE Pharmacologic Prophylaxis: VTE Score: 3 Moderate Risk (Score 3-4) - Pharmacological DVT Prophylaxis Ordered: apixaban (Eliquis).    Mobility:   Basic Mobility Inpatient Raw Score: 20  JH-HLM Goal: 6: Walk 10 steps or more  JH-HLM Achieved: 7: Walk 25 feet or more  JH-HLM Goal achieved. Continue to encourage appropriate mobility.    Patient Centered Rounds: I performed bedside rounds with nursing staff today.   Discussions with Specialists or Other Care Team Provider: Yes, nephrology    Education and Discussions with Family / Patient: Patient declined call to .     Current Length of Stay: 1 day(s)  Current Patient Status: Inpatient   Certification Statement: The patient will continue to  require additional inpatient hospital stay due to volume overload, left shoulder pain evaluation  Discharge Plan: Anticipate discharge in 24-48 hrs to home.    Code Status: Level 1 - Full Code    Subjective   Comfortable lying in bed, feels okay except left shoulder pain  Breathing is better  Concerned that he will not be able to get to dialysis tomorrow due to weather  Did not work with OT yesterday, encouraged to work with PT/OT given limitation related to fracture, falls and anticoagulation use    Objective :  Temp:  [97.8 °F (36.6 °C)-100.1 °F (37.8 °C)] 99.1 °F (37.3 °C)  HR:  [63-71] 70  BP: (139-152)/(62-65) 151/65  Resp:  [18-21] 21  SpO2:  [90 %-94 %] 92 %  O2 Device: None (Room air)    Body mass index is 31.23 kg/m².     Input and Output Summary (last 24 hours):     Intake/Output Summary (Last 24 hours) at 2/10/2025 1042  Last data filed at 2/9/2025 1801  Gross per 24 hour   Intake 357 ml   Output --   Net 357 ml       Physical Exam  Constitutional:       General: He is not in acute distress.     Appearance: He is obese.   HENT:      Head: Normocephalic and atraumatic.   Cardiovascular:      Rate and Rhythm: Normal rate.      Comments: Right infraclavicular dialysis.  Catheter  Pulmonary:      Effort: Pulmonary effort is normal. No respiratory distress.      Breath sounds: No rhonchi.   Abdominal:      General: Bowel sounds are normal. There is no distension.      Palpations: Abdomen is soft.      Tenderness: There is no abdominal tenderness. There is no guarding or rebound.   Musculoskeletal:      Right lower leg: Edema present.      Left lower leg: Edema present.      Comments: Tenderness over anterior aspect of the left shoulder, bilateral lower extremity venous stasis  Sling in place   Skin:     General: Skin is warm and dry.      Findings: No rash.   Neurological:      Mental Status: He is alert. Mental status is at baseline.      Cranial Nerves: No cranial nerve deficit.            Lines/Drains:  Lines/Drains/Airways       Active Status       Name Placement date Placement time Site Days    HD Permanent Double Catheter --  --  Internal jugular  --                                 Lab Results: I have reviewed the following results:   Results from last 7 days   Lab Units 02/10/25  0448 02/09/25  0547 02/08/25  0906   WBC Thousand/uL 7.44   < > 5.54   HEMOGLOBIN g/dL 9.4*   < > 10.9*   HEMATOCRIT % 30.1*   < > 35.6*   PLATELETS Thousands/uL 176   < > 189   SEGS PCT %  --   --  62   LYMPHO PCT %  --   --  17   MONO PCT %  --   --  11   EOS PCT %  --   --  8*    < > = values in this interval not displayed.     Results from last 7 days   Lab Units 02/10/25  0448 02/09/25  0547   SODIUM mmol/L 134* 136   POTASSIUM mmol/L 4.8 4.2   CHLORIDE mmol/L 97 98   CO2 mmol/L 20* 23   BUN mg/dL 72* 57*   CREATININE mg/dL 8.51* 7.06*   ANION GAP mmol/L 17* 15*   CALCIUM mg/dL 8.1* 8.6   ALBUMIN g/dL  --  3.8   TOTAL BILIRUBIN mg/dL  --  0.35   ALK PHOS U/L  --  150*   ALT U/L  --  45   AST U/L  --  27   GLUCOSE RANDOM mg/dL 110 120         Results from last 7 days   Lab Units 02/10/25  0714 02/09/25 2013 02/09/25  1613 02/09/25  1129 02/09/25  0731 02/08/25 2003 02/08/25  1930 02/08/25  1213   POC GLUCOSE mg/dl 133 173* 149* 139 109 115 87 107     Results from last 7 days   Lab Units 02/09/25  0547   HEMOGLOBIN A1C % 5.7*           Recent Cultures (last 7 days):         Imaging Results Review: I reviewed radiology reports from this admission including: chest xray.  Other Study Results Review: No additional pertinent studies reviewed.    Last 24 Hours Medication List:     Current Facility-Administered Medications:     acetaminophen (TYLENOL) tablet 325 mg, Q6H PRN    acetaminophen (TYLENOL) tablet 650 mg, Q6H KEMAL    allopurinol (ZYLOPRIM) tablet 100 mg, Daily    apixaban (ELIQUIS) tablet 5 mg, BID    aspirin chewable tablet 81 mg, Daily    atorvastatin (LIPITOR) tablet 80 mg, Daily    gabapentin  (NEURONTIN) capsule 200 mg, QPM    influenza vaccine, high-dose (Fluzone High-Dose) IM injection 0.5 mL, Once PRN    insulin lispro (HumALOG/ADMELOG) 100 units/mL subcutaneous injection 1-5 Units, TID AC **AND** Fingerstick Glucose (POCT), TID AC    insulin lispro (HumALOG/ADMELOG) 100 units/mL subcutaneous injection 1-5 Units, HS    lidocaine (LIDODERM) 5 % patch 1 patch, Daily    naloxone (NARCAN) 0.04 mg/mL syringe 0.04 mg, Q1MIN PRN    NIFEdipine (PROCARDIA XL) 24 hr tablet 30 mg, After Dinner    oxyCODONE (ROXICODONE) split tablet 2.5 mg, Q4H PRN **OR** oxyCODONE (ROXICODONE) IR tablet 5 mg, Q4H PRN    sevelamer (RENAGEL) tablet 800 mg, TID With Meals    Administrative Statements   Today, Patient Was Seen By: Nedra Shetty MD  I have spent a total time of 35 minutes in caring for this patient on the day of the visit/encounter including Diagnostic results, Instructions for management, Patient and family education, Importance of tx compliance, Impressions, Documenting in the medical record, Reviewing / ordering tests, medicine, procedures  , Obtaining or reviewing history  , and Communicating with other healthcare professionals .    **Please Note: This note may have been constructed using a voice recognition system.**

## 2025-02-10 NOTE — ASSESSMENT & PLAN NOTE
Status post CT upper extremity suggestive of fracture of greater tuberosity of humerus, follow-up with orthopedics

## 2025-02-10 NOTE — ASSESSMENT & PLAN NOTE
ESRD on HD, TTS at [facility]  Nephrology following, input appreciated  Continue dialysis as per nephro

## 2025-02-10 NOTE — ASSESSMENT & PLAN NOTE
EKG-sinus bradycardia/junctional bradycardia,?  Sick sinus syndrome  Patient denies dizziness or syncope  Telemetry with sinus rhythm/sinus bradycardia  History of A-fib/a flutter, does not remember his cardiologist.  Appears to have received care at [facility]  On Eliquis but not been taking recently due to cost  Resume Eliquis, will send to pharmacy for price check  Obtain records from [facility] regarding cardiology workup  PT/OT-fall precautions  Case management follow up

## 2025-02-10 NOTE — CONSULTS
Consultation - Orthopedics   Name: Naresh Carpio 64 y.o. male I MRN: [id number]  Unit/Bed#: [address] [place] [address] Date of Admission: 2/8/2025   Date of Service: 2/10/2025 I Hospital Day: 1     Inpatient consult to Orthopedic Surgery  Consult performed by: Ila Tran PA-C  Consult ordered by: Nedra Shetty MD        Physician Requesting Evaluation: Nedra Shetty MD   Reason for Evaluation / Principal Problem: left shoulder pain     Assessment & Plan  Closed fracture of proximal end of left humerus with routine healing  Patient with left greater tuberosity fracture s/p mechanical fall 2 days ago. Fracture amenable to non-operative surgery with immobilization  - NWB LUE  - Keep sling on at all times. Can be removed for hygiene with the arm kept by his side or to perform gentle elbow ROM as tolerated  - Encouraged frequent finger, wrist, and elbow ROM throughout the day  - Analgesics prn  - DVT ppx per primary team - on Eliquis and Aspirin at baseline  - Dispo: okay for discharge from Ortho perspective once medically stable. Patient reports he may remain in house for dialysis given fall risk in weather conditions and inability to transfer himself to dialysis facility. Plan to follow up with Dr. Cisneros in 2 weeks for repeat left shoulder x-rays.  ESRD (end stage renal disease) on dialysis ([facility])  Lab Results   Component Value Date    EGFR 5 02/10/2025    EGFR 7 02/09/2025    EGFR 4 02/08/2025    CREATININE 8.51 (H) 02/10/2025    CREATININE 7.06 (H) 02/09/2025    CREATININE 9.78 (H) 02/08/2025   Patient to undergo dialysis while inpatient  Anemia due to chronic kidney disease, on chronic dialysis ([facility])  Lab Results   Component Value Date    EGFR 5 02/10/2025    EGFR 7 02/09/2025    EGFR 4 02/08/2025    CREATININE 8.51 (H) 02/10/2025    CREATININE 7.06 (H) 02/09/2025    CREATININE 9.78 (H) 02/08/2025     Type II diabetes mellitus ([facility])  Lab Results   Component Value Date    HGBA1C 5.7 (H) 02/09/2025       Recent  Labs     02/09/25  1129 02/09/25  1613 02/09/25  2013 02/10/25  0714   POCGLU 139 149* 173* 133       Blood Sugar Average: Last 72 hrs:  (P) 126.5    History of TIA (transient ischemic attack)    Diabetes mellitus type 2 with peripheral artery disease (HCC)  Lab Results   Component Value Date    HGBA1C 5.7 (H) 02/09/2025       Recent Labs     02/09/25  1129 02/09/25  1613 02/09/25  2013 02/10/25  0714   POCGLU 139 149* 173* 133       Blood Sugar Average: Last 72 hrs:  (P) 126.5    Paroxysmal atrial fibrillation (HCC)    Anemia in ESRD (end-stage renal disease)  (HCC)  Lab Results   Component Value Date    EGFR 5 02/10/2025    EGFR 7 02/09/2025    EGFR 4 02/08/2025    CREATININE 8.51 (H) 02/10/2025    CREATININE 7.06 (H) 02/09/2025    CREATININE 9.78 (H) 02/08/2025         History of Present Illness   HPI: Naresh Carpio is a 64 y.o. year old male who presents with left shoulder pain and limited mobility after a mechanical fall on ice 2 days ago. The patient states he was on his way to dialysis when he fell directly onto the left shoulder and buttocks. EMS assisted him and brought him to the ED. The patient denies head strike or LOC. He is on Aspirin and Eliquis at baseline. He typically goes to dialysis every Tuesday, Thursday, and Saturday. The patient notes limited mobility in the shoulder, but is able to move the fingers, wrist, and elbow without difficulty or pain. The patient denies bruising, radiating pain, neck pain, or numbness/tingling.    Review of Systems   Constitutional:  Negative for chills and fever.   Eyes:  Negative for pain and visual disturbance.   Respiratory:  Negative for cough and shortness of breath.    Cardiovascular:  Negative for chest pain and palpitations.   Gastrointestinal:  Negative for nausea and vomiting.   Musculoskeletal:  Positive for arthralgias. Negative for back pain and neck pain.   Skin:  Negative for color change and wound.   Neurological:  Positive for weakness. Negative  for dizziness and numbness.   Psychiatric/Behavioral:  Negative for behavioral problems and confusion.    All other systems reviewed and are negative.     I have reviewed the patient's PMH, PSH, Social History, Family History, Meds, and Allergies    Objective :  Temp:  [97.8 °F (36.6 °C)-100.1 °F (37.8 °C)] 99.1 °F (37.3 °C)  HR:  [63-71] 70  BP: (139-152)/(62-65) 151/65  Resp:  [18-21] 21  SpO2:  [90 %-94 %] 92 %  O2 Device: None (Room air)    Physical Exam  Vitals and nursing note reviewed.   Constitutional:       General: He is not in acute distress.     Appearance: He is well-developed. He is not ill-appearing.   HENT:      Head: Normocephalic and atraumatic.      Mouth/Throat:      Mouth: Mucous membranes are moist.   Eyes:      Extraocular Movements: Extraocular movements intact.      Conjunctiva/sclera: Conjunctivae normal.   Cardiovascular:      Rate and Rhythm: Normal rate and regular rhythm.      Pulses: Normal pulses.      Heart sounds: Normal heart sounds.   Pulmonary:      Effort: Pulmonary effort is normal. No respiratory distress.      Breath sounds: Normal breath sounds. No wheezing.   Musculoskeletal:         General: Tenderness and signs of injury present. No swelling.      Cervical back: Normal range of motion and neck supple.   Skin:     General: Skin is warm and dry.      Capillary Refill: Capillary refill takes less than 2 seconds.      Findings: No bruising or erythema.   Neurological:      General: No focal deficit present.      Mental Status: He is alert and oriented to person, place, and time.   Psychiatric:         Mood and Affect: Mood normal.         Behavior: Behavior normal.         Ortho Exam - LUE  Skin intact. No ecchymosis, wounds, or erythema. Sling fit and in place  Moderate TTP at the fracture site and AC joint. Mild bicipital groove TTP.  Shoulder ROM and strength deferred due to known fracture  Full, painless ROM of all fingers, wrist, and elbow  5/5  strength  Motor and  sensation to light touch intact in radial, ulnar, and musculocutaneous distributions  Palpable radial pulse  Brisk Cap refill      Lab Results: I have reviewed the following results:   Recent Labs     02/08/25  0906 02/09/25  0547 02/10/25  0448   WBC 5.54 7.77 7.44   HGB 10.9* 10.2* 9.4*   HCT 35.6* 32.7* 30.1*    200 176   BUN 89* 57* 72*   CREATININE 9.78* 7.06* 8.51*     Blood Culture:   Lab Results   Component Value Date    BLOODCX No Growth After 5 Days. 03/12/2024    BLOODCX No Growth After 5 Days. 03/12/2024     Wound Culture:   Lab Results   Component Value Date    WOUNDCULT 2+ Growth of Staphylococcus aureus (A) 03/18/2024       Imaging Results Review: I personally reviewed the following image studies in PACS and associated radiology reports: CT left upper extremity and xray(s). My interpretation of the radiology images/reports is: greater tuberosity avulsion fracture versus calcific tendinitis. GH and AC degenerative changes. CT LUE FINDINGS:     OSSEOUS STRUCTURES:  *  Minimally displaced fracture of the anterior facet of the greater tuberosity. Fracture fragment measures 1.2 cm AP dimension and 1 cm transverse dimension.  *  Nonaggressive subchondral cystic change of the humeral head.  *  Punctate rotator cuff calcium hydroxyapatite crystal deposition. Calcific density along subacromial subdeltoid bursa is favored represent calcium pyrophosphate deposition.  *  Severe AC joint DJD.  *  Mild glenohumeral joint DJD.     VISUALIZED MUSCULATURE:  Unremarkable.     SOFT TISSUES: Study limited for assessment of internal derangement.     OTHER PERTINENT FINDINGS: Small left pleural effusion. No pneumothorax. No rib fracture.    Other Study Results Review: No additional pertinent studies reviewed.

## 2025-02-10 NOTE — ASSESSMENT & PLAN NOTE
Lab Results   Component Value Date    EGFR 5 02/10/2025    EGFR 7 02/09/2025    EGFR 4 02/08/2025    CREATININE 8.51 (H) 02/10/2025    CREATININE 7.06 (H) 02/09/2025    CREATININE 9.78 (H) 02/08/2025   Patient to undergo dialysis while inpatient

## 2025-02-10 NOTE — ASSESSMENT & PLAN NOTE
In setting of missed dialysis, missed once last week due to GI symptoms and this week on Thursday due to bad weather  Unable to go to HD today due to fall  Evidence of fluid overload with lower extremity swelling, abdominal distention.  Weight 107 kg compared to EDW 99 kg  Chest x-ray small right pleural effusion  2D echo 10/24-EF 60 to 65%, normal wall motion, grade 1 diastolic dysfunction.  PA pressure 30 mmHg  Volume status better after HD on 2/8.  Still remains above EDW.  Likely will miss dialysis tomorrow if discharged due to weather  Nephrology following  Continue HD as per nephrology, plan for HD in a.m.  Fluid restriction  Monitor intake output  ? consideration of addition of diuretic on nondialysis days

## 2025-02-10 NOTE — PHYSICAL THERAPY NOTE
PT EVALUATION       02/10/25 1416   PT Last Visit   PT Visit Date 02/10/25   Note Type   Note type Evaluation   Pain Assessment   Pain Assessment Tool 0-10   Pain Score 6   Pain Location/Orientation Orientation: Left;Location: Arm   Pain Onset/Description Onset: Ongoing;Descriptor: Sore   Effect of Pain on Daily Activities limits mobility/activity tolerance   Patient's Stated Pain Goal No pain   Hospital Pain Intervention(s) Repositioned;Ambulation/increased activity   Multiple Pain Sites No   Restrictions/Precautions   Weight Bearing Precautions Per Order Yes   LUE Weight Bearing Per Order NWB   Braces or Orthoses Sling   Other Precautions Bed Alarm;Chair Alarm;Fall Risk;Pain;WBS   Home Living   Type of Home Apartment   Home Layout One level;Performs ADLs on one level;Able to live on main level with bedroom/bathroom;Stairs to enter with rails  (3 NAZARIO through back vs. 8-9 steps through front)   Bathroom Toilet Standard   Home Equipment Walker   Prior Function   Level of [place] Independent with ADLs;Independent with functional mobility;Independent with IADLS   Lives With Alone   IADLs Independent with driving;Independent with medication management;Independent with meal prep   Falls in the last 6 months 1 to 4  (3 falls)   General   Additional Pertinent History Pt is a 64 year-old male who was admitted to the hospital on 2/8/25 due to fall, fluid overload, missed dialysis. CT of the LUE revealed Minimally displaced fracture of the anterior facet of the greater tuberosity. NWB in sling   Family/Caregiver Present No   Cognition   Overall Cognitive Status WFL   Arousal/Participation Cooperative   Orientation Level Oriented X4   Following Commands Follows multistep commands with increased time or repetition   Subjective   Subjective Pt agreeable to PT session this afternoon with verbal encouragement   RLE Assessment   RLE Assessment WFL   LLE Assessment   LLE Assessment WFL   Bed Mobility   Supine to Sit 4   Minimal assistance   Additional items Assist x 1;Verbal cues;Increased time required;HOB elevated   Sit to Supine Unable to assess  (pt wishes to remain seated at EOB at end of session ; pt refusing bed alarm)   Transfers   Sit to Stand 5  Supervision   Additional items Verbal cues;Increased time required   Stand to Sit 5  Supervision   Additional items Verbal cues;Increased time required   Ambulation/Elevation   Gait pattern Foward flexed;Short stride;Step through pattern  (mild unsteadiness with no significant LOB)   Gait Assistance 4  Minimal assist  (to close supervision)   Additional items Assist x 1;Verbal cues   Assistive Device Straight cane   Distance 15 feet with change of direction within room   Stair Management Assistance Not tested  (pt declined further mobility dur to fatigue)   Balance   Static Sitting Good   Dynamic Sitting Fair +   Static Standing Fair   Dynamic Standing Fair -   Ambulatory Fair -  (cane)   Activity Tolerance   Activity Tolerance Patient tolerated treatment well;Patient limited by fatigue   Nurse Made Aware yes   Assessment   Prognosis Good   Problem List Decreased endurance;Impaired balance;Decreased mobility;Impaired judgement;Decreased safety awareness;Pain;Orthopedic restrictions;Decreased skin integrity   Assessment Patient seen for Physical Therapy evaluation. Patient admitted with ESRD (end stage renal disease) on dialysis ([facility]).  Comorbidities affecting patient's physical performance include: Afib, amputation, anemia, arthritis, cardiac disease, CHF, CKD, diabetes, ESRD on hemodialysis, hypertension, neuropathy, osteoarthritis, PAD, and TIA.  Personal factors affecting patient at time of initial evaluation include: lives in 1 story house, ambulating with assistive device, stairs to enter home, inability to navigate community distances, inability to navigate level surfaces without external assistance, limited home support, positive fall history, inability to perform ADLS, and  inability to perform IADLS . Prior to admission, patient was independent with functional mobility with walker, independent with ADLS, independent with IADLS, living alone in 1 story home with 3-9 steps to enter, ambulating household distance, and ambulating community distances.  Please find objective findings from Physical Therapy assessment regarding body systems outlined above with impairments and limitations including impaired balance, decreased endurance, gait deviations, pain, decreased activity tolerance, decreased functional mobility tolerance, decreased safety awareness, impaired judgement, fall risk, and orthopedic restrictions.  The Barthel Index was used as a functional outcome tool presenting with a score of Barthel Index Score: 45 today indicating marked limitations of functional mobility and ADLS.  Patient's clinical presentation is currently unstable/unpredictable as seen in patient's presentation of changing level of pain, increased fall risk, and decreased endurance. Pt would benefit from continued Physical Therapy treatment to address deficits as defined above and maximize level of functional mobility. As demonstrated by objective findings, the assigned level of complexity for this evaluation is high.The patient's AM-[facility] Basic Mobility Inpatient Short Form Raw Score is 17. A Raw score of greater than 16 suggests the patient may benefit from discharge to home, however pt NWB LUE, increased falls risk and would benefit from continued therapy services. Please also refer to the recommendation of the Physical Therapist for safe discharge planning.   Goals   Patient Goals to go home   STG Expiration Date 02/17/25   Short Term Goal #1 pt will perform bed mobility/transfers IND ; Pt will ambulate x 150 feet Mod I with quad cane ; Pt will negotiate x 3 steps with supervision to enter/exit home ; AMPAC score will improve >20/24 to demonstrate improved functional independence   LTG Expiration Date 02/24/25    Long Term Goal #1 Pt will ambulate x 500 feet Mod I with cane ; Pt will negotiate x 3 steps Mod I + quad cane to enter/exit home ; AMPAC score will improve >22/24 to demonstrate improved functional independence   Plan   Treatment/Interventions Functional transfer training;LE strengthening/ROM;ADL retraining;Endurance training;Therapeutic exercise;Bed mobility;Gait training;Spoke to nursing;OT;Spoke to case management   PT Frequency 3-5x/wk   Discharge Recommendation   Rehab Resource Intensity Level, PT II (Moderate Resource Intensity)   Equipment Recommended Cane  (Quad cane ; pt reports he would feel more steady with increased support of quad cane compared to straight cane utilized during session)   Additional Comments Pt adamantly refusing STR ; pt will require increased level of assist at home + continued therapy services   AM-PAC Basic Mobility Inpatient   Turning in Flat Bed Without Bedrails 3   Lying on Back to Sitting on Edge of Flat Bed Without Bedrails 3   Moving Bed to Chair 3   Standing Up From Chair Using Arms 3   Walk in Room 3   Climb 3-5 Stairs With Railing 2   Basic Mobility Inpatient Raw Score 17   Basic Mobility Standardized Score 39.67   [facility] Highest Level Of Mobility   -[facility] Goal 5: Stand one or more mins   -[facility] Achieved 6: Walk 10 steps or more   Barthel Index   Feeding 5   Bathing 0   Grooming Score 0   Dressing Score 5   Bladder Score 10   Bowels Score 10   Toilet Use Score 5   Transfers (Bed/Chair) Score 10   Mobility (Level Surface) Score 0   Stairs Score 0   Barthel Index Score 45   End of Consult   Patient Position at End of Consult Seated edge of bed;All needs within reach  (refusing bed alarm)   Licensure   [place] License Number  Lizeth Gill [id number]

## 2025-02-10 NOTE — ASSESSMENT & PLAN NOTE
Patient with left greater tuberosity fracture s/p mechanical fall 2 days ago. Fracture amenable to non-operative surgery with immobilization  - NWB LUE  - Keep sling on at all times. Can be removed for hygiene with the arm kept by his side or to perform gentle elbow ROM as tolerated  - Encouraged frequent finger, wrist, and elbow ROM throughout the day  - Analgesics prn  - DVT ppx per primary team - on Eliquis and Aspirin at baseline  - Dispo: okay for discharge from Ortho perspective once medically stable. Patient reports he may remain in house for dialysis given fall risk in weather conditions and inability to transfer himself to dialysis facility. Plan to follow up with Dr. Cisneros in 2 weeks for repeat left shoulder x-rays.

## 2025-02-10 NOTE — ASSESSMENT & PLAN NOTE
TTS at [facility]  Last outpatient HD: 02/04  Last inpatient HD: 02/08  EDW 99 kg  Access: Permacath  Volume status stable today  Next hemodialysis session will be tomorrow

## 2025-02-10 NOTE — ASSESSMENT & PLAN NOTE
Lab Results   Component Value Date    EGFR 5 02/10/2025    EGFR 7 02/09/2025    EGFR 4 02/08/2025    CREATININE 8.51 (H) 02/10/2025    CREATININE 7.06 (H) 02/09/2025    CREATININE 9.78 (H) 02/08/2025

## 2025-02-10 NOTE — OCCUPATIONAL THERAPY NOTE
"OT EVALUATION       02/10/25 1526   OT Last Visit   OT Visit Date 02/10/25   Note Type   Note type Evaluation   Pain Assessment   Pain Assessment Tool 0-10   Pain Score 4   Pain Location/Orientation Orientation: Left;Location: Shoulder   Hospital Pain Intervention(s) Repositioned;Ambulation/increased activity;Elevated;Emotional support   Restrictions/Precautions   LUE Weight Bearing Per Order (S)  NWB  (per ortho: Keep sling on at all times. Can be removed for hygiene with the arm kept by his side or to perform gentle elbow ROM as tolerated  - Encourage frequent finger, wrist, and elbow ROM throughout the day)   Braces or Orthoses Sling   Other Precautions Chair Alarm;Bed Alarm;Fall Risk;Pain;WBS   Home Living   Type of Home Apartment   Home Layout One level;Stairs to enter with rails  (3 NAZARIO through back vs. 8-9 steps through front))   Bathroom Shower/Tub Tub/shower unit   Bathroom Toilet Standard   Home Equipment Walker   Prior Function   Level of [place] Independent with ADLs;Independent with functional mobility;Independent with IADLS   Lives With Alone   IADLs Independent with driving;Independent with meal prep;Independent with medication management   Falls in the last 6 months 1 to 4  (frequent falls with multiple previous fractures)   General   Additional Pertinent History Left greater tuberosity fracture s/p mechanical fall 2 days ago. Fracture amenable to non-operative surgery with immobilization.   Subjective   Subjective \"I don't need anything. I'm good.\"   ADL   Where Assessed Edge of bed   Eating Assistance 7  Independent   Grooming Assistance 5  Supervision/Setup   UB Bathing Assistance 4  Minimal Assistance   UB Bathing Deficit Setup;Verbal cueing;Supervision/safety;Increased time to complete   LB Bathing Assistance 4  Minimal Assistance   LB Bathing Deficit Setup;Steadying;Verbal cueing;Supervision/safety;Increased time to complete   UB Dressing Assistance 4  Minimal Assistance   UB Dressing " Deficit Setup;Verbal cueing;Supervision/safety;Increased time to complete;Thread LUE;Pull over head;Pull around back   LB Dressing Assistance 5  Supervision/Setup   LB Dressing Deficit Verbal cueing;Supervision/safety;Increased time to complete   Toileting Assistance  5  Supervision/Setup   Toileting Deficit Supervison/safety;Increased time to complete   Bed Mobility   Supine to Sit 4  Minimal assistance   Additional items Assist x 1;Verbal cues;Increased time required   Sit to Supine 5  Supervision   Additional items Verbal cues   Transfers   Sit to Stand 5  Supervision   Additional items Verbal cues;Increased time required   Stand to Sit 5  Supervision   Additional items Verbal cues   Stand pivot 5  Supervision   Additional items Verbal cues  (with SPC)   Balance   Static Sitting Good   Dynamic Sitting Fair +   Static Standing Fair   Dynamic Standing Fair -   Ambulatory Fair  (with SPC)   Activity Tolerance   Activity Tolerance Patient limited by fatigue;Patient limited by pain   Nurse Made Aware yes   RUE Assessment   RUE Assessment WFL   LUE Assessment   LUE Assessment X  (elbow to hand WFL)   Hand Function   Gross Motor Coordination Impaired  (LUE)   Sensation   Light Touch No apparent deficits   Psychosocial   Psychosocial (WDL) WDL   Cognition   Overall Cognitive Status WFL   Assessment   Limitation Decreased ADL status;Decreased UE ROM;Decreased UE strength;Decreased endurance;Decreased self-care trans;Decreased high-level ADLs  (decreased balance and mobility)   Assessment The evaluation identifies the following performance deficits: decreased ROM, decreased coordination, increased fall risk, decreased ADLS, decreased IADLS, pain, decreased activity tolerance, and orthopedic restrictions, that result in activity limitations and/or participation restrictions. This evaluation requires clinical decision making of moderate complexity, because the patient may present with comorbidities that affect occupational  performance and required minimal or moderate modification of tasks or assistance with the consideration of several treatment options.  The Barthel Index was used as a functional outcome tool presenting with a score of Barthel Index Score: 45, indicating marked limitations of functional mobility and ADLS.  The patient's raw score on the AM-PAC Daily Activity Inpatient Short Form is 22. A raw score of greater than or equal to 19 suggests the patient may benefit from discharge to home.   Pt adamantly refusing further OT services. He states he can do everything on his own. He has extensive medical bills and does not want any further OT. Pt is aware he should keep sling on at all times and it can only be removed for hygiene with the arm kept by his side or to perform gentle AROM of finger, wrist, and elbow throughout the day. Pt instructed in proper technique to perform Left shoulder P/AAROM when doctor deems appropriate. Pt will continue to be followed by in-pt PT who will monitor LUE as needed. Pt may require increased level of assist at home and continued therapy services for LUE after discharge. D/C from skilled in-pt OT per pt's request.   Goals   Patient Goals n/a   Plan   Goal Expiration Date 02/10/25   OT Frequency Eval only   Discharge Recommendation   Rehab Resource Intensity Level, OT III (Minimum Resource Intensity)   AM-PAC Daily Activity Inpatient   Lower Body Dressing 4   Bathing 3   Toileting 4   Upper Body Dressing 3   Grooming 4   Eating 4   Daily Activity Raw Score 22   Daily Activity Standardized Score (Calc for Raw Score >=11) 47.1   -PAC Applied Cognition Inpatient   Following a Speech/Presentation 4   Understanding Ordinary Conversation 4   Taking Medications 4   Remembering Where Things Are Placed or Put Away 4   Remembering List of 4-5 Errands 4   Taking Care of Complicated Tasks 4   Applied Cognition Raw Score 24   Applied Cognition Standardized Score 62.21   Barthel Index   Feeding 5    Bathing 0   Grooming Score 0   Dressing Score 5   Bladder Score 10   Bowels Score 10   Toilet Use Score 5   Transfers (Bed/Chair) Score 10   Mobility (Level Surface) Score 0   Stairs Score 5   Barthel Index Score 50   End of Consult   Education Provided Yes   Patient Position at End of Consult Supine;Bed/Chair alarm activated;All needs within reach   Nurse Communication Nurse aware of consult   Licensure   [place] License Number  Farheen Conklin MS, OTR/L, [place] Lic# [id number]

## 2025-02-10 NOTE — NURSING NOTE
At shift resumption, sling was observed at the  bedside table. Pt was bearing weight on the left arm. Pt refuse to wear the sling. He was educated on the importance of using the sling.Pt stated he is aware, but he feels uncomfortable.

## 2025-02-11 ENCOUNTER — APPOINTMENT (INPATIENT)
Dept: DIALYSIS | Facility: HOSPITAL | Age: 65
DRG: 640 | End: 2025-02-11
Attending: INTERNAL MEDICINE
Payer: MEDICARE

## 2025-02-11 VITALS
TEMPERATURE: 98.7 F | HEIGHT: 73 IN | WEIGHT: 237 LBS | DIASTOLIC BLOOD PRESSURE: 60 MMHG | SYSTOLIC BLOOD PRESSURE: 144 MMHG | RESPIRATION RATE: 17 BRPM | OXYGEN SATURATION: 94 % | HEART RATE: 73 BPM | BODY MASS INDEX: 31.41 KG/M2

## 2025-02-11 PROBLEM — E87.20 METABOLIC ACIDOSIS: Status: ACTIVE | Noted: 2025-02-11

## 2025-02-11 LAB
ANION GAP SERPL CALCULATED.3IONS-SCNC: 17 MMOL/L (ref 4–13)
BUN SERPL-MCNC: 95 MG/DL (ref 5–25)
CALCIUM SERPL-MCNC: 8 MG/DL (ref 8.4–10.2)
CHLORIDE SERPL-SCNC: 95 MMOL/L (ref 96–108)
CO2 SERPL-SCNC: 20 MMOL/L (ref 21–32)
CREAT SERPL-MCNC: 10.14 MG/DL (ref 0.6–1.3)
ERYTHROCYTE [DISTWIDTH] IN BLOOD BY AUTOMATED COUNT: 15.7 % (ref 11.6–15.1)
GFR SERPL CREATININE-BSD FRML MDRD: 4 ML/MIN/1.73SQ M
GLUCOSE SERPL-MCNC: 105 MG/DL (ref 65–140)
GLUCOSE SERPL-MCNC: 110 MG/DL (ref 65–140)
GLUCOSE SERPL-MCNC: 112 MG/DL (ref 65–140)
GLUCOSE SERPL-MCNC: 116 MG/DL (ref 65–140)
HCT VFR BLD AUTO: 29 % (ref 36.5–49.3)
HGB BLD-MCNC: 9.1 G/DL (ref 12–17)
MCH RBC QN AUTO: 28.9 PG (ref 26.8–34.3)
MCHC RBC AUTO-ENTMCNC: 31.4 G/DL (ref 31.4–37.4)
MCV RBC AUTO: 92 FL (ref 82–98)
PLATELET # BLD AUTO: 158 THOUSANDS/UL (ref 149–390)
PMV BLD AUTO: 10.7 FL (ref 8.9–12.7)
POTASSIUM SERPL-SCNC: 5.1 MMOL/L (ref 3.5–5.3)
RBC # BLD AUTO: 3.15 MILLION/UL (ref 3.88–5.62)
SODIUM SERPL-SCNC: 132 MMOL/L (ref 135–147)
WBC # BLD AUTO: 6.45 THOUSAND/UL (ref 4.31–10.16)

## 2025-02-11 PROCEDURE — 99239 HOSP IP/OBS DSCHRG MGMT >30: CPT | Performed by: NURSE PRACTITIONER

## 2025-02-11 PROCEDURE — 85027 COMPLETE CBC AUTOMATED: CPT | Performed by: INTERNAL MEDICINE

## 2025-02-11 PROCEDURE — 90935 HEMODIALYSIS ONE EVALUATION: CPT | Performed by: INTERNAL MEDICINE

## 2025-02-11 PROCEDURE — 82948 REAGENT STRIP/BLOOD GLUCOSE: CPT

## 2025-02-11 PROCEDURE — 80048 BASIC METABOLIC PNL TOTAL CA: CPT | Performed by: INTERNAL MEDICINE

## 2025-02-11 RX ADMIN — DOCUSATE SODIUM 100 MG: 100 CAPSULE, LIQUID FILLED ORAL at 11:52

## 2025-02-11 RX ADMIN — ALLOPURINOL 100 MG: 100 TABLET ORAL at 11:51

## 2025-02-11 RX ADMIN — OXYCODONE HYDROCHLORIDE 5 MG: 5 TABLET ORAL at 05:25

## 2025-02-11 RX ADMIN — ATORVASTATIN CALCIUM 80 MG: 80 TABLET, FILM COATED ORAL at 11:51

## 2025-02-11 RX ADMIN — ASPIRIN 81 MG CHEWABLE TABLET 81 MG: 81 TABLET CHEWABLE at 11:52

## 2025-02-11 RX ADMIN — ACETAMINOPHEN 650 MG: 325 TABLET ORAL at 11:50

## 2025-02-11 RX ADMIN — OXYCODONE HYDROCHLORIDE 5 MG: 5 TABLET ORAL at 11:49

## 2025-02-11 RX ADMIN — ACETAMINOPHEN 650 MG: 325 TABLET ORAL at 05:54

## 2025-02-11 RX ADMIN — SEVELAMER HYDROCHLORIDE 800 MG: 800 TABLET, FILM COATED ORAL at 11:51

## 2025-02-11 RX ADMIN — APIXABAN 5 MG: 5 TABLET, FILM COATED ORAL at 11:51

## 2025-02-11 NOTE — ASSESSMENT & PLAN NOTE
Status post mechanical fall, reports mild left shoulder pain at baseline but worsening pain after fall today  X-ray with possible greater tuberosity avulsion fracture versus calcific tendinitis  CT with minimally displaced fracture of anterior facet of the greater tuberosity, evidence of degenerative changes  Ortho [id number] recommendations, they have cleared him for discharge.  Monitor symptoms,   Pain control, Tylenol as needed

## 2025-02-11 NOTE — ASSESSMENT & PLAN NOTE
Ortho [id number] recommendations  Nonweightbearing, sling in place  They can follow him in the outpatient setting.

## 2025-02-11 NOTE — PROGRESS NOTES
NEPHROLOGY HOSPITAL PROGRESS NOTE   Naresh Carpio 64 y.o. male MRN: [id number]  Unit/Bed#: [address] [place] [address] Encounter: [id number]  Reason for Consult: ESRD on HD    Assessment & Plan  ESRD (end stage renal disease) on dialysis ([facility])  TTS at [facility]  Last outpatient HD: 02/04  Last inpatient HD: 02/08  EDW 99 kg  Access: Right sided permacath  HD today    HEMODIALYSIS PROCEDURE NOTE  The patient was seen and examined on hemodialysis.  Time: 4 hours  Sodium: 138 Blood flow: 400   Dialyzer: F160 Potassium: 2 Dialysate flow: 800   Access: R TDC Bicarbonate: 35 Ultrafiltration goal: 4 L   Medications on HD: None     Hypervolemia  Patient presented with lower extremity edema and shortness of breath and was significantly above EDW  We will aim for ultrafiltration of 3 to 4 L on dialysis today  Predialysis weight around 107 kg  EDW is 99 kg  Hyperphosphatemia  Continue Renvela 1 tablet 3 times daily with meals  Hypertension  Continue nifedipine and ultrafiltration on dialysis  Anemia in ESRD (end-stage renal disease)  ([facility])  Hemoglobin today 9.1  Not on SURINDER as outpatient  Type II diabetes mellitus ([facility])  Management per primary team  Falls  Status post CT upper extremity suggestive of fracture of greater tuberosity of humerus, follow-up with orthopedics  Paroxysmal atrial fibrillation ([facility])  Management per primary team  Left shoulder pain  Management per orthopedics  Closed fracture of proximal end of left humerus with routine healing  Management per orthopedics  Metabolic acidosis  To be corrected with hemodialysis today  Hyponatremia  Lack of free water clearance, to be corrected with hemodialysis today  Use 138 mEq bath on dialysis today    SUBJECTIVE / 24H INTERVAL HISTORY:  Patient seen and examined on hemodialysis.  Complains of left shoulder pain.  Denies dyspnea.    OBJECTIVE:  Current Weight: Weight - Scale: 108 kg (237 lb)  Vitals:    02/11/25 0755 02/11/25 0800 02/11/25 0830 02/11/25 0900   BP: 121/55  122/55 123/56 127/57   BP Location: Left arm      Pulse: 58 59 58 60   Resp: 16 16 16 16   Temp: 97.7 °F (36.5 °C)      TempSrc: Oral      SpO2:       Weight:       Height:           Intake/Output Summary (Last 24 hours) at 2/11/2025 0903  Last data filed at 2/11/2025 0800  Gross per 24 hour   Intake 674 ml   Output 450 ml   Net 224 ml     Review of Systems   Constitutional:  Negative for chills and fever.   HENT:  Negative for ear pain and sore throat.    Eyes:  Negative for pain and visual disturbance.   Respiratory:  Negative for cough and shortness of breath.    Cardiovascular:  Positive for leg swelling. Negative for chest pain and palpitations.   Gastrointestinal:  Negative for abdominal pain and vomiting.   Genitourinary:  Negative for dysuria and hematuria.   Musculoskeletal:  Negative for arthralgias and back pain.        Left shoulder pain   Skin:  Negative for color change and rash.   Neurological:  Negative for seizures and syncope.   All other systems reviewed and are negative.    Physical Exam  Vitals and nursing note reviewed.   Constitutional:       General: He is not in acute distress.     Appearance: He is well-developed.   HENT:      Head: Normocephalic and atraumatic.   Eyes:      Conjunctiva/sclera: Conjunctivae normal.   Cardiovascular:      Rate and Rhythm: Normal rate and regular rhythm.      Heart sounds: No murmur heard.     Comments: Right TDC in use for hemodialysis  Pulmonary:      Effort: Pulmonary effort is normal. No respiratory distress.      Breath sounds: Normal breath sounds.   Abdominal:      Palpations: Abdomen is soft.      Tenderness: There is no abdominal tenderness.   Musculoskeletal:         General: No swelling.      Cervical back: Neck supple.      Right lower leg: Edema present.      Left lower leg: Edema present.   Skin:     General: Skin is warm and dry.      Capillary Refill: Capillary refill takes less than 2 seconds.   Neurological:      Mental Status: He is alert.    Psychiatric:         Mood and Affect: Mood normal.       Medications:    Current Facility-Administered Medications:     acetaminophen (TYLENOL) tablet 325 mg, 325 mg, Oral, Q6H PRN, Nedra Shetty MD    acetaminophen (TYLENOL) tablet 650 mg, 650 mg, Oral, Q6H KEMAL, Nedra Shetty MD, 650 mg at 02/11/25 0554    allopurinol (ZYLOPRIM) tablet 100 mg, 100 mg, Oral, Daily, Nedra Shetty MD, 100 mg at 02/10/25 0804    apixaban (ELIQUIS) tablet 5 mg, 5 mg, Oral, BID, Nedra Shetty MD, 5 mg at 02/10/25 1711    aspirin chewable tablet 81 mg, 81 mg, Oral, Daily, Nedra Shetty MD, 81 mg at 02/10/25 0804    atorvastatin (LIPITOR) tablet 80 mg, 80 mg, Oral, Daily, Nedra Shetty MD, 80 mg at 02/10/25 0804    docusate sodium (COLACE) capsule 100 mg, 100 mg, Oral, BID, Nedra Shetty MD, 100 mg at 02/10/25 1211    gabapentin (NEURONTIN) capsule 200 mg, 200 mg, Oral, QPM, Nedra Shetty MD, 200 mg at 02/10/25 1711    influenza vaccine, high-dose (Fluzone High-Dose) IM injection 0.5 mL, 0.5 mL, Intramuscular, Once PRN, Nedra Shetty MD    insulin lispro (HumALOG/ADMELOG) 100 units/mL subcutaneous injection 1-5 Units, 1-5 Units, Subcutaneous, TID AC, 1 Units at 02/10/25 1213 **AND** Fingerstick Glucose (POCT), , , TID AC, Nedra Shetty MD    insulin lispro (HumALOG/ADMELOG) 100 units/mL subcutaneous injection 1-5 Units, 1-5 Units, Subcutaneous, HS, Nedra Shetty MD, 1 Units at 02/09/25 2246    lidocaine (LIDODERM) 5 % patch 1 patch, 1 patch, Topical, Daily, Isabella Webster MD, 1 patch at 02/10/25 0804    naloxone (NARCAN) 0.04 mg/mL syringe 0.04 mg, 0.04 mg, Intravenous, Q1MIN PRN, Nedra Shetty MD    NIFEdipine (PROCARDIA XL) 24 hr tablet 30 mg, 30 mg, Oral, After Dinner, Nedra Shetty MD, 30 mg at 02/10/25 7790    oxyCODONE (ROXICODONE) split tablet 2.5 mg, 2.5 mg, Oral, Q4H PRN **OR** oxyCODONE (ROXICODONE) IR tablet 5 mg, 5 mg, Oral, Q4H PRN, Nedra Shetty MD, 5 mg at 02/11/25 8505     "polyethylene glycol (MIRALAX) packet 17 g, 17 g, Oral, Daily PRN, Nedra Shetty MD    sevelamer (RENAGEL) tablet 800 mg, 800 mg, Oral, TID With Meals, Kalli Mcbride PA-C, 800 mg at 02/10/25 1710    Laboratory Results:  Results from last 7 days   Lab Units 02/11/25  0521 02/10/25  0448 02/09/25  0547 02/08/25  1520 02/08/25  1116 02/08/25  0906   WBC Thousand/uL 6.45 7.44 7.77  --   --  5.54   HEMOGLOBIN g/dL 9.1* 9.4* 10.2*  --   --  10.9*   HEMATOCRIT % 29.0* 30.1* 32.7*  --   --  35.6*   PLATELETS Thousands/uL 158 176 200  --   --  189   POTASSIUM mmol/L 5.1 4.8 4.2 5.1 6.3* 6.0*   CHLORIDE mmol/L 95* 97 98  --   --  101   CO2 mmol/L 20* 20* 23  --   --  21   BUN mg/dL 95* 72* 57*  --   --  89*   CREATININE mg/dL 10.14* 8.51* 7.06*  --   --  9.78*   CALCIUM mg/dL 8.0* 8.1* 8.6  --   --  8.6       Portions of the record may have been created with voice recognition software. Occasional wrong word or \"sound a like\" substitutions may have occurred due to the inherent limitations of voice recognition software. Read the chart carefully and recognize, using context, where substitutions have occurred.If you have any questions, please contact the dictating provider.    "

## 2025-02-11 NOTE — ASSESSMENT & PLAN NOTE
Lab Results   Component Value Date    HGBA1C 5.7 (H) 02/09/2025       Recent Labs     02/10/25  1101 02/10/25  1620 02/10/25  2003 02/11/25  0712   POCGLU 174* 110 140 112       Blood Sugar Average: Last 72 hrs:  (P) 129  A1c updated and at goal

## 2025-02-11 NOTE — ASSESSMENT & PLAN NOTE
Mechanical fall, slipped on ice while getting into his car going to dialysis  Denies any head injury, syncope, loss of consciousness.  Fall precautions

## 2025-02-11 NOTE — ASSESSMENT & PLAN NOTE
[facility]  Last outpatient HD: 02/04  Last inpatient HD: 02/08  EDW 99 kg  Access: Right sided permacath  HD today    HEMODIALYSIS PROCEDURE NOTE  The patient was seen and examined on hemodialysis.  Time: 4 hours  Sodium: 138 Blood flow: 400   Dialyzer: F160 Potassium: 2 Dialysate flow: 800   Access: R TDC Bicarbonate: 35 Ultrafiltration goal: 4 L   Medications on HD: None

## 2025-02-11 NOTE — PLAN OF CARE
Pt. On D treatment for 3 hours with a UF goal of 3-4 L as tolerated. Pt on a 2 k 2.5 basim bath for a potassium of 5.1 on 02/11/25.  Problem: METABOLIC, FLUID AND ELECTROLYTES - ADULT  Goal: Electrolytes maintained within normal limits  Description: INTERVENTIONS:  - Monitor labs and assess patient for signs and symptoms of electrolyte imbalances  - Administer electrolyte replacement as ordered  - Monitor response to electrolyte replacements, including repeat lab results as appropriate  - Instruct patient on fluid and nutrition as appropriate  Outcome: Progressing  Goal: Fluid balance maintained  Description: INTERVENTIONS:  - Monitor labs   - Monitor I/O and WT  - Instruct patient on fluid and nutrition as appropriate  - Assess for signs & symptoms of volume excess or deficit  Outcome: Progressing

## 2025-02-11 NOTE — PLAN OF CARE
Problem: Potential for Falls  Goal: Patient will remain free of falls  Description: INTERVENTIONS:  - Educate patient/family on patient safety including physical limitations  - Instruct patient to call for assistance with activity   - Consult OT/PT to assist with strengthening/mobility   - Keep Call bell within reach  - Keep bed low and locked with side rails adjusted as appropriate  - Keep care items and personal belongings within reach  - Initiate and maintain comfort rounds  - Make Fall Risk Sign visible to staff  - Offer Toileting every 2 Hours, in advance of need  - Initiate/Maintain bed alarm  - Obtain necessary fall risk management equipment: fall risk sign on door  - Apply yellow socks and bracelet for high fall risk patients  - Consider moving patient to room near nurses station  Outcome: Progressing     Problem: PAIN - ADULT  Goal: Verbalizes/displays adequate comfort level or baseline comfort level  Description: Interventions:  - Encourage patient to monitor pain and request assistance  - Assess pain using appropriate pain scale  - Administer analgesics based on type and severity of pain and evaluate response  - Implement non-pharmacological measures as appropriate and evaluate response  - Consider cultural and social influences on pain and pain management  - Notify physician/advanced practitioner if interventions unsuccessful or patient reports new pain  Outcome: Progressing     Problem: INFECTION - ADULT  Goal: Absence or prevention of progression during hospitalization  Description: INTERVENTIONS:  - Assess and monitor for signs and symptoms of infection  - Monitor lab/diagnostic results  - Monitor all insertion sites, i.e. indwelling lines, tubes, and drains  - Monitor endotracheal if appropriate and nasal secretions for changes in amount and color  - Val[place] appropriate cooling/warming therapies per order  - Administer medications as ordered  - Instruct and encourage patient and family to use  good hand hygiene technique  - Identify and instruct in appropriate isolation precautions for identified infection/condition  Outcome: Progressing  Goal: Absence of fever/infection during neutropenic period  Description: INTERVENTIONS:  - Monitor WBC    Outcome: Progressing     Problem: SAFETY ADULT  Goal: Patient will remain free of falls  Description: INTERVENTIONS:  - Educate patient/family on patient safety including physical limitations  - Instruct patient to call for assistance with activity   - Consult OT/PT to assist with strengthening/mobility   - Keep Call bell within reach  - Keep bed low and locked with side rails adjusted as appropriate  - Keep care items and personal belongings within reach  - Initiate and maintain comfort rounds  - Make Fall Risk Sign visible to staff  - Offer Toileting every 2 Hours, in advance of need  - Initiate/Maintain bed alarm  - Obtain necessary fall risk management equipment: fall risk sign on door  - Apply yellow socks and bracelet for high fall risk patients  - Consider moving patient to room near nurses station  Outcome: Progressing  Goal: Maintain or return to baseline ADL function  Description: INTERVENTIONS:  -  Assess patient's ability to carry out ADLs; assess patient's baseline for ADL function and identify physical deficits which impact ability to perform ADLs (bathing, care of mouth/teeth, toileting, grooming, dressing, etc.)  - Assess/evaluate cause of self-care deficits   - Assess range of motion  - Assess patient's mobility; develop plan if impaired  - Assess patient's need for assistive devices and provide as appropriate  - Encourage maximum independence but intervene and supervise when necessary  - Involve family in performance of ADLs  - Assess for home care needs following discharge   - Consider OT consult to assist with ADL evaluation and planning for discharge  - Provide patient education as appropriate  Outcome: Progressing  Goal: Maintains/Returns to pre  admission functional level  Description: INTERVENTIONS:  - Perform AM-PAC 6 Click Basic Mobility/ Daily Activity assessment daily.  - Set and communicate daily mobility goal to care team and patient/family/caregiver.   - Collaborate with rehabilitation services on mobility goals if consulted  - Perform Range of Motion 2 times a day.  - Reposition patient every 2 hours.  - Dangle patient 2 times a day  - Stand patient 2 times a day  - Ambulate patient 3 times a day  - Out of bed to chair 3 times a day   - Out of bed for meals 3 times a day  - Out of bed for toileting  - Record patient progress and toleration of activity level   Outcome: Progressing     Problem: DISCHARGE PLANNING  Goal: Discharge to home or other facility with appropriate resources  Description: INTERVENTIONS:  - Identify barriers to discharge w/patient and caregiver  - Arrange for needed discharge resources and transportation as appropriate  - Identify discharge learning needs (meds, wound care, etc.)  - Arrange for interpretive services to assist at discharge as needed  - Refer to Case Management Department for coordinating discharge planning if the patient needs post-hospital services based on physician/advanced practitioner order or complex needs related to functional status, cognitive ability, or social support system  Outcome: Progressing     Problem: Knowledge Deficit  Goal: Patient/family/caregiver demonstrates understanding of disease process, treatment plan, medications, and discharge instructions  Description: Complete learning assessment and assess knowledge base.  Interventions:  - Provide teaching at level of understanding  - Provide teaching via preferred learning methods  Outcome: Progressing     Problem: CARDIOVASCULAR - ADULT  Goal: Maintains optimal cardiac output and hemodynamic stability  Description: INTERVENTIONS:  - Monitor I/O, vital signs and rhythm  - Monitor for S/S and trends of decreased cardiac output  - Administer and  titrate ordered vasoactive medications to optimize hemodynamic stability  - Assess quality of pulses, skin color and temperature  - Assess for signs of decreased coronary artery perfusion  - Instruct patient to report change in severity of symptoms  Outcome: Progressing  Goal: Absence of cardiac dysrhythmias or at baseline rhythm  Description: INTERVENTIONS:  - Continuous cardiac monitoring, vital signs, obtain 12 lead EKG if ordered  - Administer antiarrhythmic and heart rate control medications as ordered  - Monitor electrolytes and administer replacement therapy as ordered  Outcome: Progressing     Problem: RESPIRATORY - ADULT  Goal: Achieves optimal ventilation and oxygenation  Description: INTERVENTIONS:  - Assess for changes in respiratory status  - Assess for changes in mentation and behavior  - Position to facilitate oxygenation and minimize respiratory effort  - Oxygen administered by appropriate delivery if ordered  - Initiate smoking cessation education as indicated  - Encourage broncho-pulmonary hygiene including cough, deep breathe, Incentive Spirometry  - Assess the need for suctioning and aspirate as needed  - Assess and instruct to report SOB or any respiratory difficulty  - Respiratory Therapy support as indicated  Outcome: Progressing     Problem: GENITOURINARY - ADULT  Goal: Maintains or returns to baseline urinary function  Description: INTERVENTIONS:  - Assess urinary function  - Encourage oral fluids to ensure adequate hydration if ordered  - Administer IV fluids as ordered to ensure adequate hydration  - Administer ordered medications as needed  - Offer frequent toileting  - Follow urinary retention protocol if ordered  Outcome: Progressing     Problem: METABOLIC, FLUID AND ELECTROLYTES - ADULT  Goal: Electrolytes maintained within normal limits  Description: INTERVENTIONS:  - Monitor labs and assess patient for signs and symptoms of electrolyte imbalances  - Administer electrolyte replacement  as ordered  - Monitor response to electrolyte replacements, including repeat lab results as appropriate  - Instruct patient on fluid and nutrition as appropriate  Outcome: Progressing  Goal: Fluid balance maintained  Description: INTERVENTIONS:  - Monitor labs   - Monitor I/O and WT  - Instruct patient on fluid and nutrition as appropriate  - Assess for signs & symptoms of volume excess or deficit  Outcome: Progressing  Goal: Glucose maintained within target range  Description: INTERVENTIONS:  - Monitor Blood Glucose as ordered  - Assess for signs and symptoms of hyperglycemia and hypoglycemia  - Administer ordered medications to maintain glucose within target range  - Assess nutritional intake and initiate nutrition service referral as needed  Outcome: Progressing     Problem: HEMATOLOGIC - ADULT  Goal: Maintains hematologic stability  Description: INTERVENTIONS  - Assess for signs and symptoms of bleeding or hemorrhage  - Monitor labs  - Administer supportive blood products/factors as ordered and appropriate  Outcome: Progressing     Problem: MUSCULOSKELETAL - ADULT  Goal: Maintain or return mobility to safest level of function  Description: INTERVENTIONS:  - Assess patient's ability to carry out ADLs; assess patient's baseline for ADL function and identify physical deficits which impact ability to perform ADLs (bathing, care of mouth/teeth, toileting, grooming, dressing, etc.)  - Assess/evaluate cause of self-care deficits   - Assess range of motion  - Assess patient's mobility  - Assess patient's need for assistive devices and provide as appropriate  - Encourage maximum independence but intervene and supervise when necessary  - Involve family in performance of ADLs  - Assess for home care needs following discharge   - Consider OT consult to assist with ADL evaluation and planning for discharge  - Provide patient education as appropriate  Outcome: Progressing

## 2025-02-11 NOTE — ASSESSMENT & PLAN NOTE
EKG-sinus bradycardia/junctional bradycardia,?  Sick sinus syndrome  Patient denies dizziness or syncope  Telemetry with sinus rhythm/sinus bradycardia  History of A-fib/a flutter, does not remember his cardiologist.  Appears to have received care at [facility]  On Eliquis but not been taking recently due to cost.  Sent updated prescription to pharmacy and patient states he will get it today.  He understands the significant importance of this medication to his overall health.  Resume Eliquis-he is not eligible for free month as he used it last month   He needs to follow-up with his primary cardiologist

## 2025-02-11 NOTE — ASSESSMENT & PLAN NOTE
Lab Results   Component Value Date    EGFR 4 02/11/2025    EGFR 5 02/10/2025    EGFR 7 02/09/2025    CREATININE 10.14 (H) 02/11/2025    CREATININE 8.51 (H) 02/10/2025    CREATININE 7.06 (H) 02/09/2025   Counts are stable  Monitor as an outpatient

## 2025-02-11 NOTE — ASSESSMENT & PLAN NOTE
Blood pressure elevated on presentation in setting of volume overload  Improved today 149/62  Continue home nifedipine

## 2025-02-11 NOTE — ASSESSMENT & PLAN NOTE
Lack of free water clearance, to be corrected with hemodialysis today  Use 138 mEq bath on dialysis today

## 2025-02-11 NOTE — ASSESSMENT & PLAN NOTE
Lab Results   Component Value Date    HGBA1C 5.7 (H) 02/09/2025       Recent Labs     02/10/25  1101 02/10/25  1620 02/10/25  2003 02/11/25  0712   POCGLU 174* 110 140 112       Blood Sugar Average: Last 72 hrs:  (P) 129  Not currently on any medication, reports good glycemic control  Controlled,  Diabetic diet  Insulin sliding scale blood sugars are stable  Continue home gabapentin, renally adjusted to once a day

## 2025-02-11 NOTE — DISCHARGE SUMMARY
Discharge Summary - Hospitalist   Name: Naresh Carpio 64 y.o. male I MRN: [id number]  Unit/Bed#: [address] [place] [address] I Date of Admission: 2/8/2025   Date of Service: 2/11/2025 I Hospital Day: 2     Assessment & Plan  Hypervolemia  In setting of missed dialysis, missed once last week due to GI symptoms and this week on Thursday due to bad weather  Unable to go to HD today due to fall  Evidence of fluid overload with lower extremity swelling, abdominal distention on admission however that is now improved.   Chest x-ray small right pleural effusion  2D echo 10/24-EF 60 to 65%, normal wall motion, grade 1 diastolic dysfunction.  PA pressure 30 mmHg  Nephrology following  Continue HD as per nephrology  Fluid restriction  Monitor intake output  He is euvolemic on exam today on discharge.  ESRD (end stage renal disease) on dialysis (HCC)  ESRD on HD, TTS at [facility]  Nephrology following, input appreciated. Stable for discharge following his treatment today.  Continue dialysis as per nephro  Falls  Mechanical fall, slipped on ice while getting into his car going to dialysis  Denies any head injury, syncope, loss of consciousness.  Fall precautions    Left shoulder pain  Status post mechanical fall, reports mild left shoulder pain at baseline but worsening pain after fall today  X-ray with possible greater tuberosity avulsion fracture versus calcific tendinitis  CT with minimally displaced fracture of anterior facet of the greater tuberosity, evidence of degenerative changes  Ortho [id number] recommendations, they have cleared him for discharge.  Monitor symptoms,   Pain control, Tylenol as needed    Closed fracture of proximal end of left humerus with routine healing  Ortho gldiyasspj-nsubtf-am recommendations  Nonweightbearing, sling in place  They can follow him in the outpatient setting.  Paroxysmal atrial fibrillation (HCC)  EKG-sinus bradycardia/junctional bradycardia,?  Sick sinus syndrome  Patient denies  dizziness or syncope  Telemetry with sinus rhythm/sinus bradycardia  History of A-fib/a flutter, does not remember his cardiologist.  Appears to have received care at [facility]  On Eliquis but not been taking recently due to cost.  Sent updated prescription to pharmacy and patient states he will get it today.  He understands the significant importance of this medication to his overall health.  Resume Eliquis-he is not eligible for free month as he used it last month   He needs to follow-up with his primary cardiologist  Type II diabetes mellitus (HCC)  Lab Results   Component Value Date    HGBA1C 5.7 (H) 02/09/2025       Recent Labs     02/10/25  1101 02/10/25  1620 02/10/25  2003 02/11/25  0712   POCGLU 174* 110 140 112       Blood Sugar Average: Last 72 hrs:  (P) 129  Not currently on any medication, reports good glycemic control  Controlled,  Diabetic diet  Insulin sliding scale blood sugars are stable  Continue home gabapentin, renally adjusted to once a day  Hypertension  Blood pressure elevated on presentation in setting of volume overload  Improved today 149/62  Continue home nifedipine  History of TIA (transient ischemic attack)  Continue aspirin, statin, Eliquis  Anemia in ESRD (end-stage renal disease)  ([facility])  Appears stable, monitor  Hyperphosphatemia  Continue sevelamer  Hyponatremia  Stable  Metabolic acidosis       Medical Problems       Resolved Problems  Date Reviewed: 1/18/2025          Resolved    Hyperkalemia 2/9/2025     Resolved by  Nedra Shetty MD    Chest tightness 2/9/2025     Resolved by  Nedra Shetty MD        Discharging Physician / Practitioner: GRANT Christy  PCP: Jeffrey Jackson  Admission Date:   Admission Orders (From admission, onward)       Ordered        02/09/25 1303  INPATIENT ADMISSION  Once            02/08/25 1211  Place in Observation  Once                          Discharge Date: 02/11/25    Consultations During Hospital  Stay:  Nephrology  Orthopedics  Case management  Physical therapy and Occupational Therapy    Procedures Performed:   CAT scan of the upper extremity on the left showed a minimally displaced fracture of the anterior facet of the greater tuberosity with calcific deposition in the rotator cuff and a small left pleural effusion  Chest x-ray showed a small right pleural effusion why is lungs are clear  Left shoulder x-ray showed a possible greater tuberosity avulsion fracture which was later confirmed with a CAT scan    Significant Findings / Test Results:   None    Incidental Findings:   None      Test Results Pending at Discharge (will require follow up):   None     Outpatient Tests Requested:  None    Complications: None    Reason for Admission: Fall on ice causing left shoulder pain    Hospital Course:   Naresh Carpio is a 64 y.o. male patient who originally presented to the hospital on 2/8/2025 due to a fall on the ice.  Patient has a past medical history of fainting and end-stage renal disease on hemodialysis Tuesday Thursday Saturday, paroxysmal atrial fibrillation supposed to be on Eliquis however was not taking, possible sick sinus syndrome, peripheral arterial disease with lower extremity amputation, hypertension, chronic anemia, history of TIA who presented after falling on the ice.  He states he was on his way to dialysis and then slipped.  He fell landing on his buttocks and subsequently had lower back pain.  He denies striking his head or head injury.  He also denied loss of consciousness.  Patient was noted to have poor compliance with his Eliquis due to financial constraints.  He was given a 3-month last month therefore is not eligible to restart that.  It is recommended that he remain on his Eliquis due to his history of paroxysmal atrial fibrillation and TIAs.  He was brought to the hospital and observed for several days.  Orthopedics was consulted due to his left avulsion fracture.  This will be  "treated conservatively with a sling.  He does not have any pain.  He will be discharged home in stable condition.           Please see above list of diagnoses and related plan for additional information.     Condition at Discharge: stable    Discharge Day Visit / Exam:   Subjective:  \"can I stay until Thursday?\" Offers no complaints of pain and is worried about doing home due to the snow storm coming.   Vitals: Blood Pressure: 149/62 (02/11/25 1109)  Pulse: 68 (02/11/25 1109)  Temperature: 97.8 °F (36.6 °C) (02/11/25 1109)  Temp Source: Oral (02/11/25 0755)  Respirations: 16 (02/11/25 1109)  Height: 6' 1\" (185.4 cm) (02/08/25 1414)  Weight - Scale: 108 kg (237 lb) (02/11/25 0541)  SpO2: 95 % (02/10/25 1532)  Physical Exam  HENT:      Mouth/Throat:      Mouth: Mucous membranes are moist.   Cardiovascular:      Rate and Rhythm: Normal rate and regular rhythm.      Pulses: Normal pulses.      Heart sounds: Normal heart sounds.   Pulmonary:      Effort: Pulmonary effort is normal.      Breath sounds: Normal breath sounds.   Abdominal:      General: Abdomen is flat.      Palpations: Abdomen is soft.   Musculoskeletal:         General: No swelling. Normal range of motion.   Skin:     General: Skin is warm and dry.      Capillary Refill: Capillary refill takes less than 2 seconds.   Neurological:      General: No focal deficit present.      Mental Status: He is alert and oriented to person, place, and time.   Psychiatric:         Mood and Affect: Mood normal.         Behavior: Behavior normal.          Discussion with Family: Patient declined call to .     Discharge instructions/Information to patient and family:   See after visit summary for information provided to patient and family.      Provisions for Follow-Up Care:  See after visit summary for information related to follow-up care and any pertinent home health orders.      Mobility at time of Discharge:   Basic Mobility Inpatient Raw Score: 17  -HLM " Goal: 5: Stand one or more mins  JH-HLM Achieved: 5: Stand (1 or more minutes)  HLM Goal achieved. Continue to encourage appropriate mobility.     Disposition:   Home    Planned Readmission: not anticipated     Discharge Medications:  See after visit summary for reconciled discharge medications provided to patient and/or family.      Administrative Statements   Discharge Statement:  I have spent a total time of 35 minutes in caring for this patient on the day of the visit/encounter. >30 minutes of time was spent on: Diagnostic results, Risks and benefits of tx options, Instructions for management, Patient and family education, Importance of tx compliance, Risk factor reductions, Documenting in the medical record, Reviewing / ordering tests, medicine, procedures  , and Communicating with other healthcare professionals .    **Please Note: This note may have been constructed using a voice recognition system**

## 2025-02-11 NOTE — HEMODIALYSIS
Post-Dialysis RN Treatment Note    Blood Pressure:  Pre 121/55 mm/Hg  Post 146/92 mmHg   EDW:  99 kg    Weight:  Pre 107.5 kg   Post 103.5 kg   Mode of weight measurement: Bed Scale   Volume Removed:  4000 ml    Treatment duration: 180 minutes    NS given:  No    Treatment shortened No   Medications given during Rx: None Reported   Estimated Kt/V:  None Reported   Access type: Permacath/TDC   Needle Gauge:  N/A   Access Issues: No    Report called to primary nurse:   Yes Rola DIMAS RN

## 2025-02-11 NOTE — PLAN OF CARE
Pt on HD treatment for 3 hours with a UF goal of 3-4 L as tolerated, Pt on a 2 k 2.5 basim bath for a potassium of 5.1 on 02/11/25.  Problem: METABOLIC, FLUID AND ELECTROLYTES - ADULT  Goal: Electrolytes maintained within normal limits  Description: INTERVENTIONS:  - Monitor labs and assess patient for signs and symptoms of electrolyte imbalances  - Administer electrolyte replacement as ordered  - Monitor response to electrolyte replacements, including repeat lab results as appropriate  - Instruct patient on fluid and nutrition as appropriate  2/11/2025 0922 by Rishabh Obrien RN  Outcome: Progressing  2/11/2025 0816 by Rishabh Obrien RN  Outcome: Progressing  Goal: Fluid balance maintained  Description: INTERVENTIONS:  - Monitor labs   - Monitor I/O and WT  - Instruct patient on fluid and nutrition as appropriate  - Assess for signs & symptoms of volume excess or deficit  2/11/2025 0922 by Rishabh Obrien RN  Outcome: Progressing  2/11/2025 0816 by Rishabh Obrien RN  Outcome: Progressing

## 2025-02-11 NOTE — ASSESSMENT & PLAN NOTE
ESRD on HD, TTS at [facility]  Nephrology following, input appreciated. Stable for discharge following his treatment today.  Continue dialysis as per nephro

## 2025-02-11 NOTE — ASSESSMENT & PLAN NOTE
Patient presented with lower extremity edema and shortness of breath and was significantly above EDW  We will aim for ultrafiltration of 3 to 4 L on dialysis today  Predialysis weight around 107 kg  EDW is 99 kg

## 2025-02-17 NOTE — NURSING NOTE
- WE WILL CALL TO SCHEDULE YOUR CT SIMULATION FOR RADIATION PLANNING.       - IF YOU HAVE ANY QUESTIONS OR CONCERNS REGARDING RADIATION THERAPY, PLEASE CALL THE NURSING LINE [phone].    Patient left 2S in stable condition with all belongings at side

## 2025-02-18 ENCOUNTER — HOSPITAL ENCOUNTER (INPATIENT)
Facility: HOSPITAL | Age: 65
LOS: 2 days | Discharge: HOME | DRG: 291 | End: 2025-02-21
Attending: EMERGENCY MEDICINE | Admitting: FAMILY MEDICINE
Payer: MEDICARE

## 2025-02-18 ENCOUNTER — APPOINTMENT (EMERGENCY)
Dept: RADIOLOGY | Facility: HOSPITAL | Age: 65
DRG: 291 | End: 2025-02-18
Payer: MEDICARE

## 2025-02-18 ENCOUNTER — APPOINTMENT (OUTPATIENT)
Dept: DIALYSIS | Facility: HOSPITAL | Age: 65
DRG: 291 | End: 2025-02-18
Payer: MEDICARE

## 2025-02-18 DIAGNOSIS — R07.89 CHEST TIGHTNESS: Primary | ICD-10-CM

## 2025-02-18 DIAGNOSIS — N18.6 ESRD (END STAGE RENAL DISEASE) ON DIALYSIS (HCC): ICD-10-CM

## 2025-02-18 DIAGNOSIS — M25.552 LEFT HIP PAIN: ICD-10-CM

## 2025-02-18 DIAGNOSIS — Z59.82 TRANSPORTATION INSECURITY: ICD-10-CM

## 2025-02-18 DIAGNOSIS — Z74.8 ASSISTANCE NEEDED WITH TRANSPORTATION: Primary | ICD-10-CM

## 2025-02-18 DIAGNOSIS — J81.1 PULMONARY EDEMA: ICD-10-CM

## 2025-02-18 DIAGNOSIS — E11.621 DIABETIC ULCER OF LEFT MIDFOOT ASSOCIATED WITH TYPE 2 DIABETES MELLITUS, LIMITED TO BREAKDOWN OF SKIN (HCC): ICD-10-CM

## 2025-02-18 DIAGNOSIS — L97.421 DIABETIC ULCER OF LEFT MIDFOOT ASSOCIATED WITH TYPE 2 DIABETES MELLITUS, LIMITED TO BREAKDOWN OF SKIN (HCC): ICD-10-CM

## 2025-02-18 DIAGNOSIS — Z99.2 ESRD (END STAGE RENAL DISEASE) ON DIALYSIS (HCC): ICD-10-CM

## 2025-02-18 PROBLEM — M10.9 GOUT: Status: ACTIVE | Noted: 2025-02-18

## 2025-02-18 PROBLEM — E87.5 HYPERKALEMIA: Status: ACTIVE | Noted: 2024-11-06

## 2025-02-18 LAB
ALBUMIN SERPL BCG-MCNC: 4 G/DL (ref 3.5–5)
ALP SERPL-CCNC: 99 U/L (ref 34–104)
ALT SERPL W P-5'-P-CCNC: 19 U/L (ref 7–52)
ANION GAP SERPL CALCULATED.3IONS-SCNC: 22 MMOL/L (ref 4–13)
AST SERPL W P-5'-P-CCNC: 16 U/L (ref 13–39)
ATRIAL RATE: 59 BPM
BASOPHILS # BLD AUTO: 0.02 THOUSANDS/ΜL (ref 0–0.1)
BASOPHILS NFR BLD AUTO: 1 % (ref 0–1)
BILIRUB SERPL-MCNC: 0.3 MG/DL (ref 0.2–1)
BUN SERPL-MCNC: 141 MG/DL (ref 5–25)
CALCIUM SERPL-MCNC: 7.3 MG/DL (ref 8.4–10.2)
CARDIAC TROPONIN I PNL SERPL HS: 29 NG/L (ref ?–50)
CHLORIDE SERPL-SCNC: 93 MMOL/L (ref 96–108)
CO2 SERPL-SCNC: 16 MMOL/L (ref 21–32)
CREAT SERPL-MCNC: 14.91 MG/DL (ref 0.6–1.3)
EOSINOPHIL # BLD AUTO: 0.09 THOUSAND/ΜL (ref 0–0.61)
EOSINOPHIL NFR BLD AUTO: 2 % (ref 0–6)
ERYTHROCYTE [DISTWIDTH] IN BLOOD BY AUTOMATED COUNT: 15.3 % (ref 11.6–15.1)
GFR SERPL CREATININE-BSD FRML MDRD: 2 ML/MIN/1.73SQ M
GLUCOSE SERPL-MCNC: 113 MG/DL (ref 65–140)
GLUCOSE SERPL-MCNC: 124 MG/DL (ref 65–140)
GLUCOSE SERPL-MCNC: 131 MG/DL (ref 65–140)
GLUCOSE SERPL-MCNC: 93 MG/DL (ref 65–140)
HCT VFR BLD AUTO: 29.8 % (ref 36.5–49.3)
HGB BLD-MCNC: 9.7 G/DL (ref 12–17)
IMM GRANULOCYTES # BLD AUTO: 0.02 THOUSAND/UL (ref 0–0.2)
IMM GRANULOCYTES NFR BLD AUTO: 1 % (ref 0–2)
LYMPHOCYTES # BLD AUTO: 0.95 THOUSANDS/ΜL (ref 0.6–4.47)
LYMPHOCYTES NFR BLD AUTO: 25 % (ref 14–44)
MCH RBC QN AUTO: 29.2 PG (ref 26.8–34.3)
MCHC RBC AUTO-ENTMCNC: 32.6 G/DL (ref 31.4–37.4)
MCV RBC AUTO: 90 FL (ref 82–98)
MONOCYTES # BLD AUTO: 0.47 THOUSAND/ΜL (ref 0.17–1.22)
MONOCYTES NFR BLD AUTO: 12 % (ref 4–12)
NEUTROPHILS # BLD AUTO: 2.26 THOUSANDS/ΜL (ref 1.85–7.62)
NEUTS SEG NFR BLD AUTO: 59 % (ref 43–75)
NRBC BLD AUTO-RTO: 0 /100 WBCS
P AXIS: 81 DEGREES
PLATELET # BLD AUTO: 136 THOUSANDS/UL (ref 149–390)
PMV BLD AUTO: 10.9 FL (ref 8.9–12.7)
POTASSIUM SERPL-SCNC: 6.3 MMOL/L (ref 3.5–5.3)
PR INTERVAL: 198 MS
PROT SERPL-MCNC: 7.5 G/DL (ref 6.4–8.4)
QRS AXIS: 33 DEGREES
QRSD INTERVAL: 88 MS
QT INTERVAL: 482 MS
QTC INTERVAL: 477 MS
RBC # BLD AUTO: 3.32 MILLION/UL (ref 3.88–5.62)
SODIUM SERPL-SCNC: 131 MMOL/L (ref 135–147)
T WAVE AXIS: 69 DEGREES
VENTRICULAR RATE: 59 BPM
WBC # BLD AUTO: 3.81 THOUSAND/UL (ref 4.31–10.16)

## 2025-02-18 PROCEDURE — 85025 COMPLETE CBC W/AUTO DIFF WBC: CPT | Performed by: EMERGENCY MEDICINE

## 2025-02-18 PROCEDURE — 99285 EMERGENCY DEPT VISIT HI MDM: CPT

## 2025-02-18 PROCEDURE — 93010 ELECTROCARDIOGRAM REPORT: CPT | Performed by: INTERNAL MEDICINE

## 2025-02-18 PROCEDURE — 82948 REAGENT STRIP/BLOOD GLUCOSE: CPT

## 2025-02-18 PROCEDURE — 99222 1ST HOSP IP/OBS MODERATE 55: CPT

## 2025-02-18 PROCEDURE — 84484 ASSAY OF TROPONIN QUANT: CPT | Performed by: EMERGENCY MEDICINE

## 2025-02-18 PROCEDURE — 93005 ELECTROCARDIOGRAM TRACING: CPT

## 2025-02-18 PROCEDURE — G0257 UNSCHED DIALYSIS ESRD PT HOS: HCPCS

## 2025-02-18 PROCEDURE — 71045 X-RAY EXAM CHEST 1 VIEW: CPT

## 2025-02-18 PROCEDURE — 96374 THER/PROPH/DIAG INJ IV PUSH: CPT

## 2025-02-18 PROCEDURE — 99285 EMERGENCY DEPT VISIT HI MDM: CPT | Performed by: EMERGENCY MEDICINE

## 2025-02-18 PROCEDURE — 36415 COLL VENOUS BLD VENIPUNCTURE: CPT | Performed by: EMERGENCY MEDICINE

## 2025-02-18 PROCEDURE — 80053 COMPREHEN METABOLIC PANEL: CPT | Performed by: EMERGENCY MEDICINE

## 2025-02-18 RX ORDER — ATORVASTATIN CALCIUM 80 MG/1
80 TABLET, FILM COATED ORAL DAILY
Status: DISCONTINUED | OUTPATIENT
Start: 2025-02-18 | End: 2025-02-21 | Stop reason: HOSPADM

## 2025-02-18 RX ORDER — ALLOPURINOL 100 MG/1
100 TABLET ORAL DAILY
Status: DISCONTINUED | OUTPATIENT
Start: 2025-02-18 | End: 2025-02-21 | Stop reason: HOSPADM

## 2025-02-18 RX ORDER — FUROSEMIDE 10 MG/ML
80 INJECTION INTRAMUSCULAR; INTRAVENOUS ONCE
Status: COMPLETED | OUTPATIENT
Start: 2025-02-18 | End: 2025-02-18

## 2025-02-18 RX ORDER — NIFEDIPINE 30 MG/1
30 TABLET, EXTENDED RELEASE ORAL
Status: DISCONTINUED | OUTPATIENT
Start: 2025-02-18 | End: 2025-02-21 | Stop reason: HOSPADM

## 2025-02-18 RX ORDER — ACETAMINOPHEN 325 MG/1
650 TABLET ORAL EVERY 6 HOURS PRN
Status: DISCONTINUED | OUTPATIENT
Start: 2025-02-18 | End: 2025-02-21 | Stop reason: HOSPADM

## 2025-02-18 RX ORDER — ONDANSETRON 2 MG/ML
4 INJECTION INTRAMUSCULAR; INTRAVENOUS ONCE
Status: COMPLETED | OUTPATIENT
Start: 2025-02-18 | End: 2025-02-18

## 2025-02-18 RX ORDER — INSULIN LISPRO 100 [IU]/ML
1-6 INJECTION, SOLUTION INTRAVENOUS; SUBCUTANEOUS
Status: DISCONTINUED | OUTPATIENT
Start: 2025-02-18 | End: 2025-02-21 | Stop reason: HOSPADM

## 2025-02-18 RX ORDER — ASPIRIN 81 MG/1
81 TABLET, CHEWABLE ORAL DAILY
Status: DISCONTINUED | OUTPATIENT
Start: 2025-02-18 | End: 2025-02-21 | Stop reason: HOSPADM

## 2025-02-18 RX ORDER — SEVELAMER HYDROCHLORIDE 800 MG/1
800 TABLET, FILM COATED ORAL
Status: DISCONTINUED | OUTPATIENT
Start: 2025-02-18 | End: 2025-02-21 | Stop reason: HOSPADM

## 2025-02-18 RX ADMIN — APIXABAN 5 MG: 5 TABLET, FILM COATED ORAL at 13:08

## 2025-02-18 RX ADMIN — APIXABAN 5 MG: 5 TABLET, FILM COATED ORAL at 17:43

## 2025-02-18 RX ADMIN — ONDANSETRON 4 MG: 2 INJECTION INTRAMUSCULAR; INTRAVENOUS at 20:25

## 2025-02-18 RX ADMIN — NIFEDIPINE 30 MG: 30 TABLET, EXTENDED RELEASE ORAL at 17:43

## 2025-02-18 RX ADMIN — SEVELAMER HYDROCHLORIDE 800 MG: 800 TABLET, FILM COATED ORAL at 13:08

## 2025-02-18 RX ADMIN — ASPIRIN 81 MG CHEWABLE TABLET 81 MG: 81 TABLET CHEWABLE at 13:08

## 2025-02-18 RX ADMIN — ATORVASTATIN CALCIUM 80 MG: 80 TABLET, FILM COATED ORAL at 13:08

## 2025-02-18 RX ADMIN — FUROSEMIDE 80 MG: 10 INJECTION, SOLUTION INTRAMUSCULAR; INTRAVENOUS at 10:11

## 2025-02-18 RX ADMIN — ALLOPURINOL 100 MG: 100 TABLET ORAL at 13:08

## 2025-02-18 RX ADMIN — ACETAMINOPHEN 650 MG: 325 TABLET ORAL at 14:02

## 2025-02-18 RX ADMIN — SEVELAMER HYDROCHLORIDE 800 MG: 800 TABLET, FILM COATED ORAL at 16:49

## 2025-02-18 SDOH — ECONOMIC STABILITY - TRANSPORTATION SECURITY: TRANSPORTATION INSECURITY: Z59.82

## 2025-02-18 NOTE — ED PROVIDER NOTES
Final Diagnosis:  1. Chest tightness    2. Pulmonary edema    3. Transportation insecurity        Chief Complaint   Patient presents with    Shortness of Breath     Pt arrives with shortness of breath after missing dialysis       HPI  Pt arrives w/ wob  He is due dialysis  He drives himself there  He said he didn't want to drive there because of the weather    When asked why he won't do a local dialysis he says he gets enjoyment driving to his other diaylsis    He's on RA in no resp distress  Full sentences  No inc in WOB    He says it's more of a tightness    Otherwise he's on eloquis    He has bilateral venous stasis dermatitis and pitting edema LE. Symmetric.   He does still make urine    EMS additionally reports:     - Previous charting underwent limited review with attention to last ED visits, labs, ekgs, and prior imaging.  Chart review reveals :     Admission on 02/08/2025, Discharged on 02/11/2025   Component Date Value Ref Range Status    WBC 02/08/2025 5.54  4.31 - 10.16 Thousand/uL Final    RBC 02/08/2025 3.73 (L)  3.88 - 5.62 Million/uL Final    Hemoglobin 02/08/2025 10.9 (L)  12.0 - 17.0 g/dL Final    Hematocrit 02/08/2025 35.6 (L)  36.5 - 49.3 % Final    MCV 02/08/2025 95  82 - 98 fL Final    MCH 02/08/2025 29.2  26.8 - 34.3 pg Final    MCHC 02/08/2025 30.6 (L)  31.4 - 37.4 g/dL Final    RDW 02/08/2025 15.6 (H)  11.6 - 15.1 % Final    MPV 02/08/2025 10.1  8.9 - 12.7 fL Final    Platelets 02/08/2025 189  149 - 390 Thousands/uL Final    nRBC 02/08/2025 0  /100 WBCs Final    Segmented % 02/08/2025 62  43 - 75 % Final    Immature Grans % 02/08/2025 1  0 - 2 % Final    Lymphocytes % 02/08/2025 17  14 - 44 % Final    Monocytes % 02/08/2025 11  4 - 12 % Final    Eosinophils Relative 02/08/2025 8 (H)  0 - 6 % Final    Basophils Relative 02/08/2025 1  0 - 1 % Final    Absolute Neutrophils 02/08/2025 3.53  1.85 - 7.62 Thousands/µL Final    Absolute Immature Grans 02/08/2025 0.03  0.00 - 0.20 Thousand/uL Final     Absolute Lymphocytes 02/08/2025 0.92  0.60 - 4.47 Thousands/µL Final    Absolute Monocytes 02/08/2025 0.60  0.17 - 1.22 Thousand/µL Final    Eosinophils Absolute 02/08/2025 0.43  0.00 - 0.61 Thousand/µL Final    Basophils Absolute 02/08/2025 0.03  0.00 - 0.10 Thousands/µL Final    Sodium 02/08/2025 138  135 - 147 mmol/L Final    Potassium 02/08/2025 6.0 (H)  3.5 - 5.3 mmol/L Final    Chloride 02/08/2025 101  96 - 108 mmol/L Final    CO2 02/08/2025 21  21 - 32 mmol/L Final    ANION GAP 02/08/2025 16 (H)  4 - 13 mmol/L Final    BUN 02/08/2025 89 (H)  5 - 25 mg/dL Final    Creatinine 02/08/2025 9.78 (H)  0.60 - 1.30 mg/dL Final    Standardized to IDMS reference method    Glucose 02/08/2025 128  65 - 140 mg/dL Final    If the patient is fasting, the ADA then defines impaired fasting glucose as > 100 mg/dL and diabetes as > or equal to 123 mg/dL.    Calcium 02/08/2025 8.6  8.4 - 10.2 mg/dL Final    AST 02/08/2025 90 (H)  13 - 39 U/L Final    ALT 02/08/2025 69 (H)  7 - 52 U/L Final    Specimen collection should occur prior to Sulfasalazine administration due to the potential for falsely depressed results.     Alkaline Phosphatase 02/08/2025 156 (H)  34 - 104 U/L Final    Total Protein 02/08/2025 7.4  6.4 - 8.4 g/dL Final    Albumin 02/08/2025 4.2  3.5 - 5.0 g/dL Final    Total Bilirubin 02/08/2025 0.29  0.20 - 1.00 mg/dL Final    Use of this assay is not recommended for patients undergoing treatment with eltrombopag due to the potential for falsely elevated results.  N-acetyl-p-benzoquinone imine (metabolite of Acetaminophen) will generate erroneously low results in samples for patients that have taken an overdose of Acetaminophen.    eGFR 02/08/2025 4  ml/min/1.73sq m Final    Ventricular Rate 02/08/2025 58  BPM Final    Atrial Rate 02/08/2025 58  BPM Final    UT Interval 02/08/2025 194  ms Final    QRSD Interval 02/08/2025 88  ms Final    QT Interval 02/08/2025 466  ms Final    QTC Interval 02/08/2025 457  ms Final    P  "Axis 02/08/2025 66  degrees Final    QRS [place] 02/08/2025 12  degrees Final    T Wave Axis 02/08/2025 68  degrees Final    Ventricular Rate 02/08/2025 36  BPM Final    Atrial Rate 02/08/2025 35  BPM Final    QRSD Interval 02/08/2025 76  ms Final    QT Interval 02/08/2025 516  ms Final    QTC Interval 02/08/2025 399  ms Final    QRS [place] 02/08/2025 16  degrees Final    T Wave Axis 02/08/2025 45  degrees Final    hs TnI 0hr 02/08/2025 32  \"Refer to ACS Flowchart\"- see link ng/L Final    Comment:                                              Initial (time 0) result  If >=50 ng/L, Myocardial injury suggested ;  Type of myocardial injury and treatment strategy  to be determined.  If 5-49 ng/L, a delta result at 2 hours and or 4 hours will be needed to further evaluate.  If <4 ng/L, and chest pain has been >3 hours since onset, patient may qualify for discharge based on the HEART score in the ED.  If <5 ng/L and <3hours since onset of chest pain, a delta result at 2 hours will be needed to further evaluate.    HS Troponin 99th Percentile URL of a Health Population=12 ng/L with a 95% Confidence Interval of 8-18 ng/L.    Second Troponin (time 2 hours)  If calculated delta >= 20 ng/L,  Myocardial injury suggested ; Type of myocardial injury and treatment strategy to be determined.  If 5-49 ng/L and the calculated delta is 5-19 ng/L, consult medical service for evaluation.  Continue evaluation for ischemia on ecg and other possible etiology and repeat hs troponin at 4 hours.  If delta                            is <5 ng/L at 2 hours, consider discharge based on risk stratification via the HEART score (if in ED), or CARRIE risk score in IP/Observation.    HS Troponin 99th Percentile URL of a Health Population=12 ng/L with a 95% Confidence Interval of 8-18 ng/L.    Potassium 02/08/2025 6.3 (HH)  3.5 - 5.3 mmol/L Final    hs TnI 2hr 02/08/2025 31  \"Refer to ACS Flowchart\"- see link ng/L Final    Comment:                              " "                Initial (time 0) result  If >=50 ng/L, Myocardial injury suggested ;  Type of myocardial injury and treatment strategy  to be determined.  If 5-49 ng/L, a delta result at 2 hours and or 4 hours will be needed to further evaluate.  If <4 ng/L, and chest pain has been >3 hours since onset, patient may qualify for discharge based on the HEART score in the ED.  If <5 ng/L and <3hours since onset of chest pain, a delta result at 2 hours will be needed to further evaluate.    HS Troponin 99th Percentile URL of a Health Population=12 ng/L with a 95% Confidence Interval of 8-18 ng/L.    Second Troponin (time 2 hours)  If calculated delta >= 20 ng/L,  Myocardial injury suggested ; Type of myocardial injury and treatment strategy to be determined.  If 5-49 ng/L and the calculated delta is 5-19 ng/L, consult medical service for evaluation.  Continue evaluation for ischemia on ecg and other possible etiology and repeat hs troponin at 4 hours.  If delta                            is <5 ng/L at 2 hours, consider discharge based on risk stratification via the HEART score (if in ED), or CARRIE risk score in IP/Observation.    HS Troponin 99th Percentile URL of a Health Population=12 ng/L with a 95% Confidence Interval of 8-18 ng/L.    Delta 2hr hsTnI 02/08/2025 -1  <20 ng/L Final    hs TnI 4hr 02/08/2025 33  \"Refer to ACS Flowchart\"- see link ng/L Final    Comment:                                              Initial (time 0) result  If >=50 ng/L, Myocardial injury suggested ;  Type of myocardial injury and treatment strategy  to be determined.  If 5-49 ng/L, a delta result at 2 hours and or 4 hours will be needed to further evaluate.  If <4 ng/L, and chest pain has been >3 hours since onset, patient may qualify for discharge based on the HEART score in the ED.  If <5 ng/L and <3hours since onset of chest pain, a delta result at 2 hours will be needed to further evaluate.    HS Troponin 99th Percentile URL of a Health " Population=12 ng/L with a 95% Confidence Interval of 8-18 ng/L.    Second Troponin (time 2 hours)  If calculated delta >= 20 ng/L,  Myocardial injury suggested ; Type of myocardial injury and treatment strategy to be determined.  If 5-49 ng/L and the calculated delta is 5-19 ng/L, consult medical service for evaluation.  Continue evaluation for ischemia on ecg and other possible etiology and repeat hs troponin at 4 hours.  If delta                            is <5 ng/L at 2 hours, consider discharge based on risk stratification via the HEART score (if in ED), or CARRIE risk score in IP/Observation.    HS Troponin 99th Percentile URL of a Health Population=12 ng/L with a 95% Confidence Interval of 8-18 ng/L.    Delta 4hr hsTnI 02/08/2025 1  <20 ng/L Final    Potassium 02/08/2025 5.1  3.5 - 5.3 mmol/L Final    Color, UA 02/08/2025 Light Yellow   Final    Clarity, UA 02/08/2025 Clear   Final    Specific Gravity, UA 02/08/2025 1.020  1.005 - 1.030 Final    pH, UA 02/08/2025 7.0  4.5, 5.0, 5.5, 6.0, 6.5, 7.0, 7.5, 8.0 Final    Leukocytes, UA 02/08/2025 Moderate (A)  Negative Final    Nitrite, UA 02/08/2025 Negative  Negative Final    Protein, UA 02/08/2025 300 (3+) (A)  Negative mg/dl Final    Glucose, UA 02/08/2025 70 (7/100%) (A)  Negative mg/dl Final    Ketones, UA 02/08/2025 Negative  Negative mg/dl Final    Urobilinogen, UA 02/08/2025 <2.0  <2.0 mg/dl mg/dl Final    Bilirubin, UA 02/08/2025 Negative  Negative Final    Occult Blood, UA 02/08/2025 Trace (A)  Negative Final    RBC, UA 02/08/2025 0-1 (A)  None Seen, 2-4 /hpf Final    WBC, UA 02/08/2025 10-20 (A)  None Seen, 2-4, 5-60 /hpf Final    Epithelial Cells 02/08/2025 Occasional  None Seen, Occasional /hpf Final    Bacteria, UA 02/08/2025 Occasional  None Seen, Occasional /hpf Final    POC Glucose 02/08/2025 107  65 - 140 mg/dl Final    Total CK 02/08/2025 43  39 - 308 U/L Final    POC Glucose 02/08/2025 87  65 - 140 mg/dl Final    POC Glucose 02/08/2025 115  65 -  140 mg/dl Final    WBC 02/09/2025 7.77  4.31 - 10.16 Thousand/uL Final    RBC 02/09/2025 3.51 (L)  3.88 - 5.62 Million/uL Final    Hemoglobin 02/09/2025 10.2 (L)  12.0 - 17.0 g/dL Final    Hematocrit 02/09/2025 32.7 (L)  36.5 - 49.3 % Final    MCV 02/09/2025 93  82 - 98 fL Final    MCH 02/09/2025 29.1  26.8 - 34.3 pg Final    MCHC 02/09/2025 31.2 (L)  31.4 - 37.4 g/dL Final    RDW 02/09/2025 15.5 (H)  11.6 - 15.1 % Final    Platelets 02/09/2025 200  149 - 390 Thousands/uL Final    MPV 02/09/2025 10.6  8.9 - 12.7 fL Final    Sodium 02/09/2025 136  135 - 147 mmol/L Final    Potassium 02/09/2025 4.2  3.5 - 5.3 mmol/L Final    Chloride 02/09/2025 98  96 - 108 mmol/L Final    CO2 02/09/2025 23  21 - 32 mmol/L Final    ANION GAP 02/09/2025 15 (H)  4 - 13 mmol/L Final    BUN 02/09/2025 57 (H)  5 - 25 mg/dL Final    Creatinine 02/09/2025 7.06 (H)  0.60 - 1.30 mg/dL Final    Standardized to IDMS reference method    Glucose 02/09/2025 120  65 - 140 mg/dL Final    If the patient is fasting, the ADA then defines impaired fasting glucose as > 100 mg/dL and diabetes as > or equal to 123 mg/dL.    Glucose, Fasting 02/09/2025 120 (H)  65 - 99 mg/dL Final    Calcium 02/09/2025 8.6  8.4 - 10.2 mg/dL Final    AST 02/09/2025 27  13 - 39 U/L Final    ALT 02/09/2025 45  7 - 52 U/L Final    Specimen collection should occur prior to Sulfasalazine administration due to the potential for falsely depressed results.     Alkaline Phosphatase 02/09/2025 150 (H)  34 - 104 U/L Final    Total Protein 02/09/2025 7.1  6.4 - 8.4 g/dL Final    Albumin 02/09/2025 3.8  3.5 - 5.0 g/dL Final    Total Bilirubin 02/09/2025 0.35  0.20 - 1.00 mg/dL Final    Use of this assay is not recommended for patients undergoing treatment with eltrombopag due to the potential for falsely elevated results.  N-acetyl-p-benzoquinone imine (metabolite of Acetaminophen) will generate erroneously low results in samples for patients that have taken an overdose of Acetaminophen.     eGFR 02/09/2025 7  ml/min/1.73sq m Final    Hemoglobin A1C 02/09/2025 5.7 (H)  Normal 4.0-5.6%; PreDiabetic 5.7-6.4%; Diabetic >=6.5%; Glycemic control for adults with diabetes <7.0% % Final    EAG 02/09/2025 117  mg/dl Final    POC Glucose 02/09/2025 109  65 - 140 mg/dl Final    POC Glucose 02/09/2025 139  65 - 140 mg/dl Final    POC Glucose 02/09/2025 149 (H)  65 - 140 mg/dl Final    POC Glucose 02/09/2025 173 (H)  65 - 140 mg/dl Final    Sodium 02/10/2025 134 (L)  135 - 147 mmol/L Final    Potassium 02/10/2025 4.8  3.5 - 5.3 mmol/L Final    Chloride 02/10/2025 97  96 - 108 mmol/L Final    CO2 02/10/2025 20 (L)  21 - 32 mmol/L Final    ANION GAP 02/10/2025 17 (H)  4 - 13 mmol/L Final    BUN 02/10/2025 72 (H)  5 - 25 mg/dL Final    Creatinine 02/10/2025 8.51 (H)  0.60 - 1.30 mg/dL Final    Standardized to IDMS reference method    Glucose 02/10/2025 110  65 - 140 mg/dL Final    If the patient is fasting, the ADA then defines impaired fasting glucose as > 100 mg/dL and diabetes as > or equal to 123 mg/dL.    Calcium 02/10/2025 8.1 (L)  8.4 - 10.2 mg/dL Final    eGFR 02/10/2025 5  ml/min/1.73sq m Final    WBC 02/10/2025 7.44  4.31 - 10.16 Thousand/uL Final    RBC 02/10/2025 3.30 (L)  3.88 - 5.62 Million/uL Final    Hemoglobin 02/10/2025 9.4 (L)  12.0 - 17.0 g/dL Final    Hematocrit 02/10/2025 30.1 (L)  36.5 - 49.3 % Final    MCV 02/10/2025 91  82 - 98 fL Final    MCH 02/10/2025 28.5  26.8 - 34.3 pg Final    MCHC 02/10/2025 31.2 (L)  31.4 - 37.4 g/dL Final    RDW 02/10/2025 15.7 (H)  11.6 - 15.1 % Final    Platelets 02/10/2025 176  149 - 390 Thousands/uL Final    MPV 02/10/2025 10.6  8.9 - 12.7 fL Final    POC Glucose 02/10/2025 133  65 - 140 mg/dl Final    POC Glucose 02/10/2025 174 (H)  65 - 140 mg/dl Final    POC Glucose 02/10/2025 110  65 - 140 mg/dl Final    Supplier Name 02/10/2025 [employer]   Final    Supplier Phone Number 02/10/2025 [phone]   Final    Order Status 02/10/2025  Completed   Final    Delivery Note 02/10/2025 Please advise of cost so I can discuss with pt.  For delivery to pt at bedside prior to d/c if possible, patient can be contacted directly for billing   Final    Delivery Request Date 02/10/2025 02/11/2025   Final    Item Description 02/10/2025 [employer], [place] [place]   Final    Qty: 1    POC Glucose 02/10/2025 140  65 - 140 mg/dl Final    Sodium 02/11/2025 132 (L)  135 - 147 mmol/L Final    Potassium 02/11/2025 5.1  3.5 - 5.3 mmol/L Final    Chloride 02/11/2025 95 (L)  96 - 108 mmol/L Final    CO2 02/11/2025 20 (L)  21 - 32 mmol/L Final    ANION GAP 02/11/2025 17 (H)  4 - 13 mmol/L Final    BUN 02/11/2025 95 (H)  5 - 25 mg/dL Final    Creatinine 02/11/2025 10.14 (H)  0.60 - 1.30 mg/dL Final    Standardized to IDMS reference method    Glucose 02/11/2025 105  65 - 140 mg/dL Final    If the patient is fasting, the ADA then defines impaired fasting glucose as > 100 mg/dL and diabetes as > or equal to 123 mg/dL.    Calcium 02/11/2025 8.0 (L)  8.4 - 10.2 mg/dL Final    eGFR 02/11/2025 4  ml/min/1.73sq m Final    WBC 02/11/2025 6.45  4.31 - 10.16 Thousand/uL Final    RBC 02/11/2025 3.15 (L)  3.88 - 5.62 Million/uL Final    Hemoglobin 02/11/2025 9.1 (L)  12.0 - 17.0 g/dL Final    Hematocrit 02/11/2025 29.0 (L)  36.5 - 49.3 % Final    MCV 02/11/2025 92  82 - 98 fL Final    MCH 02/11/2025 28.9  26.8 - 34.3 pg Final    MCHC 02/11/2025 31.4  31.4 - 37.4 g/dL Final    RDW 02/11/2025 15.7 (H)  11.6 - 15.1 % Final    Platelets 02/11/2025 158  149 - 390 Thousands/uL Final    MPV 02/11/2025 10.7  8.9 - 12.7 fL Final    POC Glucose 02/11/2025 112  65 - 140 mg/dl Final    POC Glucose 02/11/2025 110  65 - 140 mg/dl Final    POC Glucose 02/11/2025 116  65 - 140 mg/dl Final       - No language barrier.   - History obtained from patient    - Discuss patient's care, with patient permission or by chart review, with      PMH:   has a past medical history of Acute cystitis (10/18/2024), Acute on  chronic anemia (01/23/2022), Atrial fibrillation (HCC), Bradycardia (09/05/2023), Diabetes mellitus (HCC), ESRD (end stage renal disease) on dialysis ([facility]), Hematuria (10/10/2024), Hematuria (10/10/2024), Hyperkalemia (04/06/2024), Hyperkalemia (04/06/2024), Hyperlipidemia, Hypertension, Left toe amputee (HCC), TIA (transient ischemic attack), and Toxic metabolic encephalopathy (10/08/2024).    PSH:   has a past surgical history that includes IR tunneled dialysis catheter placement (01/27/2022); IR tunneled central line removal (08/23/2023); IR temporary dialysis catheter placement (08/25/2023); IR lower extremity angiogram (08/29/2023); IR tunneled dialysis catheter placement (08/30/2023); Amputation (Left); pr amputation metatarsal w/toe single (Right, 8/31/2023); and Hemodialysis Adult (1/18/2025).     Social History:  Tobacco Use: Low Risk  (2/18/2025)    Patient History     Smoking Tobacco Use: Never     Smokeless Tobacco Use: Never     Passive Exposure: Never     Alcohol Use: Not At Risk (8/19/2023)    AUDIT-C     Frequency of Alcohol Consumption: Never     Average Number of Drinks: Patient does not drink     Frequency of Binge Drinking: Never     No illicit use       ROS:  Pertinent positives/negatives: .     Some ROS may be present in the HPI and would take precedent over these standard questions asked below.   Review of Systems   Constitutional:  Negative for chills and fever.   Respiratory:  Positive for chest tightness and shortness of breath.    Cardiovascular:  Positive for chest pain and leg swelling.        CONSTITUTIONAL:  No lethargy. No unexpected weight loss. No change in behavior.  EYES:  No pain, redness, or discharge. No loss of vision. No orbital trauma or pain.   ENT:  No tinnitus or decreased hearing. No epistaxis/purulent rhinorrhea. No voice change, airway closing, trismus.   CARDIOVASCULAR:  No chest pain. No skin mottling or pallor. No change in exertional capacity  RESPIRATORY:  No  "hemoptysis. No paroxysmal nocturnal dyspnea. No stridor. No apnea or bluing.   GASTROINTESTINAL:  No vomiting, diarrhea. No distension. No melena. No hematochezia.   GENITOURINARY:  No nocturia. No hematuria or foul smelling or cloudy urine. No discharge. No sores/adenopathy.   MUSCULOSKELETAL:  No contracture.  No new deformity.   INTEGUMENTARY:  No swelling. No unexpected contusions. No abrasions. No lymphangitis.  NEUROLOGIC:  No meningismus. No new numbness of the extremities. No new focal weakness. No postural instability  PSYCHIATRIC:  No SI HI AVH  HEMATOLOGICAL:  No bleeding. No petechiae. No bruising.  ALLERGIES:  No urticaria. No sudden abd cramping. No stridor.    PE:     Physical exam highlights:   Physical Exam       Vitals:    02/18/25 0915   BP: (!) 189/86   BP Location: Left arm   Pulse: 63   Resp: (!) 24   Temp: 97.6 °F (36.4 °C)   TempSrc: Oral   SpO2: 98%   Weight: 111 kg (245 lb 9.5 oz)   Height: 6' 1\" (1.854 m)     Vitals reviewed by me.   Nursing note reviewed  Chaperone present for all sensitive exam.  Const: No acute distress. Alert. Nontoxic. Not diaphoretic.    HEENT: External ears normal. No protrusion drainage swelling. Nose normal. No drainage/traumatic deformity. MM. Mouth with baseline/symmetric movement. No trismus.   Eyes: No squinting. No icterus. No tearing/swelling/drainage. Tracks through the room with normal EOM.   Neck: ROM normal. No rigidity. No meningismus.  Cards: Rate as per vitals Compared to monitor sinus unless documented. Regular Well perfused.  Pulm: Effort and excursion normal. No distress. No audible wheezing/no stridor. Normal resp rate without retraction or change in work of breathing. On RA. Full sentences.  Abd: No distension beyond baseline. No fluctuant wave. Patient without peritoneal pain with shifting/bumping the bed.   MSK: ROM normal baseline. No deformity. No contractures from baseline.   Skin: vneous stasis legs. Symmetric pitting edema that's " pronounced.   Neuro: Nonfocal. Baseline. CN grossly intact. Moving all four with coordination.   Psych: Normal behavior and affect.        A:  - Nursing note reviewed.    Ddx and MDM  Considered diagnoses    ACS?  Trop, EKG HEART  Trop actually improved a couple from prior 10d ago.   Amb referral cards    2. PE? No on eloquis    3. Pulm edema? Likely  Chest xr  Yes  Still makes urine  Large lasix dosing    4. Pneumonia?  None on prelim      Otherwise r/o gross electrolyte derangment - shouldn't prevent his definitive treatment. Dialysis  R/o anemia. Similar to prior     Case management consult placed to help w/ transport to dialysis.     Dispo decision       My conversation with consultant reveals:        Decision rules:                    ED Course as of 02/18/25 0954   Tue Feb 18, 2025   0944 Procedure Note: EKG  Date/Time: 02/18/25 9:45 AM   Interpreted by: Dakota Spann  Indications / Diagnosis: chest tightness, sob  ECG reviewed by me, the ED Provider: yes   The EKG demonstrates:  Rhythm: sinus    Intervals: normal intervals  Axis: normal axis  QRS/Blocks: normal QRS  ST Changes: No acute ST Changes, no STD/NAZARIO.  T wave changes: none             My read of the XR/CT scan reveals:    XR chest 1 view portable   ED Interpretation   Smaller right effusion than prior          Orders Placed This Encounter   Procedures    XR chest 1 view portable    CBC and differential    Comprehensive metabolic panel    HS Troponin 0hr (reflex protocol)    Ambulatory Referral to Cardiology    Consult to case management    ECG 12 lead    ECG 12 lead     Labs Reviewed   CBC AND DIFFERENTIAL - Abnormal       Result Value Ref Range Status    WBC 3.81 (*) 4.31 - 10.16 Thousand/uL Final    RBC 3.32 (*) 3.88 - 5.62 Million/uL Final    Hemoglobin 9.7 (*) 12.0 - 17.0 g/dL Final    Hematocrit 29.8 (*) 36.5 - 49.3 % Final    MCV 90  82 - 98 fL Final    MCH 29.2  26.8 - 34.3 pg Final    MCHC 32.6  31.4 - 37.4 g/dL Final    RDW 15.3 (*) 11.6 -  "15.1 % Final    MPV 10.9  8.9 - 12.7 fL Final    Platelets 136 (*) 149 - 390 Thousands/uL Final    nRBC 0  /100 WBCs Final    Segmented % 59  43 - 75 % Final    Immature Grans % 1  0 - 2 % Final    Lymphocytes % 25  14 - 44 % Final    Monocytes % 12  4 - 12 % Final    Eosinophils Relative 2  0 - 6 % Final    Basophils Relative 1  0 - 1 % Final    Absolute Neutrophils 2.26  1.85 - 7.62 Thousands/µL Final    Absolute Immature Grans 0.02  0.00 - 0.20 Thousand/uL Final    Absolute Lymphocytes 0.95  0.60 - 4.47 Thousands/µL Final    Absolute Monocytes 0.47  0.17 - 1.22 Thousand/µL Final    Eosinophils Absolute 0.09  0.00 - 0.61 Thousand/µL Final    Basophils Absolute 0.02  0.00 - 0.10 Thousands/µL Final   HS TROPONIN I 0HR - Normal    hs TnI 0hr 29  \"Refer to ACS Flowchart\"- see link ng/L Final    Comment:                                              Initial (time 0) result  If >=50 ng/L, Myocardial injury suggested ;  Type of myocardial injury and treatment strategy  to be determined.  If 5-49 ng/L, a delta result at 2 hours and or 4 hours will be needed to further evaluate.  If <4 ng/L, and chest pain has been >3 hours since onset, patient may qualify for discharge based on the HEART score in the ED.  If <5 ng/L and <3hours since onset of chest pain, a delta result at 2 hours will be needed to further evaluate.    HS Troponin 99th Percentile URL of a Health Population=12 ng/L with a 95% Confidence Interval of 8-18 ng/L.    Second Troponin (time 2 hours)  If calculated delta >= 20 ng/L,  Myocardial injury suggested ; Type of myocardial injury and treatment strategy to be determined.  If 5-49 ng/L and the calculated delta is 5-19 ng/L, consult medical service for evaluation.  Continue evaluation for ischemia on ecg and other possible etiology and repeat hs troponin at 4 hours.  If delta is <5 ng/L at 2 hours, consider discharge based on risk stratification via the HEART score (if in ED), or CARRIE risk score in " IP/Observation.    HS Troponin 99th Percentile URL of a Health Population=12 ng/L with a 95% Confidence Interval of 8-18 ng/L.   COMPREHENSIVE METABOLIC PANEL       *Each of these labs was reviewed. Particular standout labs will be noted in the ED Course above     Final Diagnosis:  1. Chest tightness    2. Pulmonary edema    3. Transportation insecurity          P:  - hospital tx includes   Medications   furosemide (LASIX) injection 80 mg (has no administration in time range)         - disposition  Time reflects when diagnosis was documented in both MDM as applicable and the Disposition within this note       Time User Action Codes Description Comment    2/18/2025  9:51 AM Dakota Spann [R07.89] Chest tightness     2/18/2025  9:51 AM Dakota Spann [J81.1] Pulmonary edema     2/18/2025  9:54 AM Dakota Spann [Z59.82] Transportation insecurity           ED Disposition       ED Disposition   Discharge    Condition   Stable    Date/Time   Tue Feb 18, 2025  9:51 AM    Comment   Naresh Carpio discharge to home/self care.                   Follow-up Information    None         - patient will call their PCP to let them know they were in the emergency department. We discuss return precautions and patient is agreeable with plan and aformentioned disposition.       - additional treatment intended, if consistent with primary provider:  - patient to follow with :      Patient's Medications   Discharge Prescriptions    No medications on file       Prior to Admission Medications   Prescriptions Last Dose Informant Patient Reported? Taking?   NIFEdipine (PROCARDIA XL) 30 mg 24 hr tablet   No No   Sig: Take 1 tablet (30 mg total) by mouth daily after dinner   allopurinol (ZYLOPRIM) 100 mg tablet  Self No No   Sig: Take 1 tablet (100 mg total) by mouth daily   apixaban (Eliquis) 5 mg   No No   Sig: Take 1 tablet (5 mg total) by mouth 2 (two) times a day   aspirin 81 mg chewable tablet   No No   Sig: Chew 1  "tablet (81 mg total) daily   atorvastatin (LIPITOR) 80 mg tablet   Yes No   Sig: Take 80 mg by mouth daily   gabapentin (NEURONTIN) 100 mg capsule   No No   Sig: Take 2 capsules (200 mg total) by mouth 2 (two) times a day   sevelamer (RENAGEL) 800 mg tablet  Self No No   Sig: Take 1 tablet (800 mg total) by mouth 3 (three) times a day with meals      Facility-Administered Medications: None       Portions of the record may have been created with voice recognition software. Occasional wrong word or \"sound a like\" substitutions may have occurred due to the inherent limitations of voice recognition software. Read the chart carefully and recognize, using context, where substitutions have occurred.    Electronically signed by:  MD Dakota Ryan MD  02/18/25 0950       Dakota Spann MD  02/18/25 0953       Dakota Spann MD  02/18/25 0954    "

## 2025-02-18 NOTE — H&P
"H&P - Hospitalist   Name: Naresh Carpio 64 y.o. male I MRN: [id number]  Unit/Bed#: ED CT1 I Date of Admission: 2/18/2025   Date of Service: 2/18/2025 I Hospital Day: 0     Assessment & Plan  ESRD (end stage renal disease) on dialysis ([facility])  Lab Results   Component Value Date    EGFR 2 02/18/2025    EGFR 4 02/11/2025    EGFR 5 02/10/2025    CREATININE 14.91 (H) 02/18/2025    CREATININE 10.14 (H) 02/11/2025    CREATININE 8.51 (H) 02/10/2025   Patient presented to ED on 2/18/2025 for several days of shortness of breath and chest tightness. Patient reports he has not been to dialysis since he was discharged on 02/11/2025. EKG on admission without ischemic changes. CXR with small right pleural effusion. Labs showing DEVIN. Patient with increased work of breathing, rhonchi, BLE edema, and abdominal distension on exam.   Dialysis today  Continue home sevelamer 900 mg TID with meals  Renal diet, fluid restriction  CBC and BMP in AM  Nephrology consulted  Acute on chronic diastolic (congestive) heart failure ([facility])  Wt Readings from Last 3 Encounters:   02/18/25 111 kg (245 lb 9.5 oz)   02/11/25 108 kg (237 lb)   01/18/25 107 kg (235 lb 9.6 oz)   Plan for dialysis today  Received IV lasix 80mg x1 dose in the ED. Further volume control with HD  I's and O's  Daily weights  Anemia due to chronic kidney disease, on chronic dialysis ([facility])  Lab Results   Component Value Date    EGFR 2 02/18/2025    EGFR 4 02/11/2025    EGFR 5 02/10/2025    CREATININE 14.91 (H) 02/18/2025    CREATININE 10.14 (H) 02/11/2025    CREATININE 8.51 (H) 02/10/2025   Hgb 9.7 on admission  CBC in AM  Type II diabetes mellitus ([facility])  Lab Results   Component Value Date    HGBA1C 5.7 (H) 02/09/2025     No results for input(s): \"POCGLU\" in the last 72 hours.    Glucose 131 on admission   A1C on 02/09/2025 was 5.7  Not currently on any medications for glucose management. Manages diabetes through diet and life style modifications   Continue PTA gabapentin 100 mg " BID  SSI with accu-checks   Hypertension  Likely due to missed dialysis   Continue PTA nifedipine 30 mg daily  Continue to monitor  Hypervolemia  Likely 2/2 missed dialysis  Dialysis today  On admission, LE edema and abdominal distension present   CXR 02/18/2025 with small right pleural effusion   Fluid and salt restriction   Monitor I's and O's   Nephrology consulted   Paroxysmal atrial fibrillation (HCC)  EKG on admission sinus bradycardia  Continue home Eliquis 5 mg BID  History of TIA (transient ischemic attack)  Continue PTA aspirin 81 mg BID, atorvastatin 80 mg daily  Hyponatremia  Sodium 131 on admission.  Likely 2/2 volume overload from missed dialysis  Plan for dialysis today  BMP in AM   Hyperkalemia  Potassium 6.3 on admission  EKG with no acute changes  Plan for dialysis today  BMP in AM  Metabolic acidosis  Likely 2/2 missed dialysis   BMP in AM  Gout  Continue home allopuriol 100 mg daily      VTE Pharmacologic Prophylaxis: VTE Score: 4 Moderate Risk (Score 3-4) - Pharmacological DVT Prophylaxis Ordered: apixaban (Eliquis).  Code Status: Level 1 - Full Code  Discussion with family: Patient declined call to .     Anticipated Length of Stay: Patient will be admitted on an observation basis with an anticipated length of stay of less than 2 midnights secondary to hyperkalemia, hypervolemia .    History of Present Illness   Chief Complaint: Shortness of breath, chest tightness     Naresh Carpio is a 64 y.o. male with a PMH of DM2, ESRD on dialysis, gout, a-fib, HTN, peripheral artery disease, TIA who presents with several days of SOB and chest tightness. Patient was recently admitted from 02/08/2025 - 02/11/2025 after a fall on ice and had similar symptoms at that time d/t missed dialysis. He reports the last time he was dialyzed was during that admission and has not been to dialysis since discharge on 02/11/2025. Patient on T/Th/Sat schedule. He states this feels worse than prior times he  has missed dialysis. He notes some abdominal fullness and cough. Also reports tailbone pain from prior fall several months ago. Denies chest pain, headache, fever, abdominal pain, nausea, vomiting, and diarrhea.     Patient afebrile, O2 sat of 99% on RA on admission. BP elevated at 176/73, likely due to missed dialysis. Initial EKG was sinus bradycardia with no acute changes. CXR with small right pleural effusion. BMP with hyponatremia, hyperkalemia (6.3), metabolic acidosis, and DEVIN likely d/t missed dialysis. CBC with Hgb 9.7.     Review of Systems   Constitutional:  Negative for chills and fever.   HENT:  Negative for ear pain and sore throat.    Eyes:  Negative for pain and visual disturbance.   Respiratory:  Positive for cough, chest tightness and shortness of breath.    Cardiovascular:  Positive for leg swelling. Negative for chest pain and palpitations.   Gastrointestinal:  Positive for abdominal distention. Negative for abdominal pain, diarrhea, nausea and vomiting.   Genitourinary:  Negative for dysuria and hematuria.   Musculoskeletal:  Negative for arthralgias and back pain.        Positive for tail bone pain   Skin:  Negative for rash.        Positive for chronic BLE venous stasis insufficiency changes   Neurological:  Negative for seizures and syncope.   All other systems reviewed and are negative.      Historical Information   Past Medical History:   Diagnosis Date    Acute cystitis 10/18/2024    Acute on chronic anemia 01/23/2022    Atrial fibrillation (HCC)     Bradycardia 09/05/2023    Diabetes mellitus (HCC)     ESRD (end stage renal disease) on dialysis (HCC)     Hematuria 10/10/2024    Hematuria 10/10/2024    Hyperkalemia 04/06/2024    Hyperkalemia 04/06/2024    Hyperlipidemia     Hypertension     Left toe amputee (HCC)     TIA (transient ischemic attack)     Toxic metabolic encephalopathy 10/08/2024     Past Surgical History:   Procedure Laterality Date    AMPUTATION Left     left foot resection  10 years ago dr tran    HEMODIALYSIS ADULT  1/18/2025    IR LOWER EXTREMITY ANGIOGRAM  08/29/2023    IR TEMPORARY DIALYSIS CATHETER PLACEMENT  08/25/2023    IR TUNNELED CENTRAL LINE REMOVAL  08/23/2023    IR TUNNELED DIALYSIS CATHETER PLACEMENT  01/27/2022    IR TUNNELED DIALYSIS CATHETER PLACEMENT  08/30/2023    [place] AMPUTATION METATARSAL W/TOE SINGLE Right 8/31/2023    Procedure: RIGHT PARTIAL 1ST RAY RESECTION WITH  WOUND VAC APPLICATION;  Surgeon: Won Parmar DPM;  Location: [facility];  Service: Podiatry     Social History     Tobacco Use    Smoking status: Never     Passive exposure: Never    Smokeless tobacco: Never   Vaping Use    Vaping status: Never Used   Substance and Sexual Activity    Alcohol use: Not Currently     Alcohol/week: 0.0 standard drinks of alcohol     Comment: 0    Drug use: Never    Sexual activity: Not on file     E-Cigarette/Vaping    E-Cigarette Use Never User      E-Cigarette/Vaping Substances    Nicotine No     THC No     CBD No     Flavoring No     Other No     Unknown No      History reviewed. No pertinent family history.  Social History:  Marital Status:    Occupation: Disabled  Patient Pre-hospital Living Situation: With other family member: Sister  Patient Pre-hospital Level of Mobility: walks  Patient Pre-hospital Diet Restrictions: Diabetic diet     Meds/Allergies   I have reviewed home medications with patient personally.  Prior to Admission medications    Medication Sig Start Date End Date Taking? Authorizing Provider   allopurinol (ZYLOPRIM) 100 mg tablet Take 1 tablet (100 mg total) by mouth daily 3/22/24   Lore Silver DO   apixaban (Eliquis) 5 mg Take 1 tablet (5 mg total) by mouth 2 (two) times a day 2/10/25   Nedra Shetty MD   aspirin 81 mg chewable tablet Chew 1 tablet (81 mg total) daily 10/23/24   Mir Salinas MD   atorvastatin (LIPITOR) 80 mg tablet Take 80 mg by mouth daily 11/2/21   Historical Provider, MD   gabapentin (NEURONTIN) 100 mg  capsule Take 2 capsules (200 mg total) by mouth 2 (two) times a day 3/22/24 11/19/24  Lore Silver DO   NIFEdipine (PROCARDIA XL) 30 mg 24 hr tablet Take 1 tablet (30 mg total) by mouth daily after dinner 12/2/24   Nallely Pisano PA-C   sevelamer (RENAGEL) 800 mg tablet Take 1 tablet (800 mg total) by mouth 3 (three) times a day with meals 2/2/22   Lore Silver DO     No Known Allergies    Objective :  Temp:  [97.6 °F (36.4 °C)] 97.6 °F (36.4 °C)  HR:  [48-63] 48  BP: (176-189)/(73-86) 176/73  Resp:  [21-24] 21  SpO2:  [98 %-99 %] 99 %  O2 Device: None (Room air)    Physical Exam  Vitals and nursing note reviewed.   Constitutional:       General: He is in acute distress.      Appearance: He is well-developed. He is obese.   HENT:      Head: Normocephalic and atraumatic.   Eyes:      Conjunctiva/sclera: Conjunctivae normal.   Cardiovascular:      Rate and Rhythm: Normal rate and regular rhythm.      Heart sounds: Normal heart sounds. No murmur heard.  Pulmonary:      Effort: Respiratory distress (mild) present.      Breath sounds: Rhonchi present.   Abdominal:      General: There is distension.      Palpations: Abdomen is soft.      Tenderness: There is no abdominal tenderness.   Musculoskeletal:      Cervical back: Neck supple.      Right lower leg: Edema present.      Left lower leg: Edema present.   Skin:     General: Skin is warm and dry.      Capillary Refill: Capillary refill takes less than 2 seconds.      Comments: BLE: chronic skin changes 2/2 venous stasis insufficiency    Neurological:      Mental Status: He is alert.   Psychiatric:         Mood and Affect: Mood normal.          Lines/Drains:        Lab Results: I have reviewed the following results:  Results from last 7 days   Lab Units 02/18/25  0919   WBC Thousand/uL 3.81*   HEMOGLOBIN g/dL 9.7*   HEMATOCRIT % 29.8*   PLATELETS Thousands/uL 136*   SEGS PCT % 59   LYMPHO PCT % 25   MONO PCT % 12   EOS PCT % 2     Results from last 7 days   Lab  Units 02/18/25  0919   SODIUM mmol/L 131*   POTASSIUM mmol/L 6.3*   CHLORIDE mmol/L 93*   CO2 mmol/L 16*   BUN mg/dL 141*   CREATININE mg/dL 14.91*   ANION GAP mmol/L 22*   CALCIUM mg/dL 7.3*   ALBUMIN g/dL 4.0   TOTAL BILIRUBIN mg/dL 0.30   ALK PHOS U/L 99   ALT U/L 19   AST U/L 16   GLUCOSE RANDOM mg/dL 131         Results from last 7 days   Lab Units 02/11/25  1657   POC GLUCOSE mg/dl 116     Lab Results   Component Value Date    HGBA1C 5.7 (H) 02/09/2025    HGBA1C 5.5 10/08/2024    HGBA1C 6.2 (H) 07/02/2024           Imaging Results Review: I personally reviewed the following image studies in PACS and associated radiology reports: chest xray. My interpretation of the radiology images/reports is: Small right pleural effusion.  Other Study Results Review: EKG was reviewed.     Administrative Statements   I have spent a total time of 30 minutes in caring for this patient on the day of the visit/encounter including Diagnostic results, Risks and benefits of tx options, Instructions for management, Patient and family education, Importance of tx compliance, Impressions, Counseling / Coordination of care, Documenting in the medical record, Reviewing/placing orders in the medical record (including tests, medications, and/or procedures), Obtaining or reviewing history  , and Communicating with other healthcare professionals .    ** Please Note: This note has been constructed using a voice recognition system. **

## 2025-02-18 NOTE — ED NOTES
"Pt. States \" I dont want to leave\". States I want to have dialysis here and then go home\" Dr. Spann aware.      Oksana Gann RN  02/18/25 1018    "

## 2025-02-18 NOTE — ASSESSMENT & PLAN NOTE
Lab Results   Component Value Date    EGFR 2 02/18/2025    EGFR 4 02/11/2025    EGFR 5 02/10/2025    CREATININE 14.91 (H) 02/18/2025    CREATININE 10.14 (H) 02/11/2025    CREATININE 8.51 (H) 02/10/2025   Patient presented to ED on 2/18/2025 for several days of shortness of breath and chest tightness. Patient reports he has not been to dialysis since he was discharged on 02/11/2025. EKG on admission without ischemic changes. CXR with small right pleural effusion. Labs showing DEVIN. Patient with increased work of breathing, rhonchi, BLE edema, and abdominal distension on exam.   Dialysis today  Continue home sevelamer 900 mg TID with meals  Renal diet, fluid restriction  CBC and BMP in AM  Nephrology consulted

## 2025-02-18 NOTE — ASSESSMENT & PLAN NOTE
Likely 2/2 missed dialysis  Dialysis today  On admission, LE edema and abdominal distension present   CXR 02/18/2025 with small right pleural effusion   Fluid and salt restriction   Monitor I's and O's   Nephrology consulted

## 2025-02-18 NOTE — ASSESSMENT & PLAN NOTE
Wt Readings from Last 3 Encounters:   02/18/25 111 kg (245 lb 9.5 oz)   02/11/25 108 kg (237 lb)   01/18/25 107 kg (235 lb 9.6 oz)   Plan for dialysis today  Received IV lasix 80mg x1 dose in the ED. Further volume control with HD  I's and O's  Daily weights

## 2025-02-18 NOTE — PLAN OF CARE
Target UF Goal 3 L as tolerated. Patient dialyzing for  3.5 hours  on 2 K bath, 1k for the last hour for serum K of  6.3  per protocol. Treatment plan reviewed with Dr. Reyes.   Problem: METABOLIC, FLUID AND ELECTROLYTES - ADULT  Goal: Electrolytes maintained within normal limits  Description: INTERVENTIONS:  - Monitor labs and assess patient for signs and symptoms of electrolyte imbalances  - Administer electrolyte replacement as ordered  - Monitor response to electrolyte replacements, including repeat lab results as appropriate  - Instruct patient on fluid and nutrition as appropriate  Outcome: Progressing  Goal: Fluid balance maintained  Description: INTERVENTIONS:  - Monitor labs   - Monitor I/O and WT  - Instruct patient on fluid and nutrition as appropriate  - Assess for signs & symptoms of volume excess or deficit  Outcome: Progressing   Post-Dialysis RN Treatment Note    Blood Pressure:  Pre 164/72 mm/Hg  Post 204/93 mmHg   EDW:  108 kg    Weight:  Pre 107 kg standing  Post 105.5 kg bed wt   Mode of weight measurement: Bed Scale   Volume Removed:  2700 ml    Treatment duration: 186 minutes    NS given:  No    Treatment shortened Yes, describe: as per patient demand due to not feeling well, headache and persistent leg cramps   Medications given during Rx: None Reported   Estimated Kt/V:  None Reported   Access type: Permacath/TDC   Needle Gauge:  na   Access Issues: No    Report called to primary nurse:   Yes Monisha Bowers RN

## 2025-02-18 NOTE — ASSESSMENT & PLAN NOTE
Lab Results   Component Value Date    EGFR 2 02/18/2025    EGFR 4 02/11/2025    EGFR 5 02/10/2025    CREATININE 14.91 (H) 02/18/2025    CREATININE 10.14 (H) 02/11/2025    CREATININE 8.51 (H) 02/10/2025   Hgb 9.7 on admission  CBC in AM

## 2025-02-18 NOTE — ASSESSMENT & PLAN NOTE
Sodium 131 on admission.  Likely 2/2 volume overload from missed dialysis  Plan for dialysis today  BMP in AM

## 2025-02-18 NOTE — ASSESSMENT & PLAN NOTE
"Lab Results   Component Value Date    HGBA1C 5.7 (H) 02/09/2025     No results for input(s): \"POCGLU\" in the last 72 hours.    Glucose 131 on admission   A1C on 02/09/2025 was 5.7  Not currently on any medications for glucose management. Manages diabetes through diet and life style modifications   Continue PTA gabapentin 100 mg BID  SSI with accu-checks   "

## 2025-02-19 ENCOUNTER — APPOINTMENT (INPATIENT)
Dept: RADIOLOGY | Facility: HOSPITAL | Age: 65
DRG: 291 | End: 2025-02-19
Payer: MEDICARE

## 2025-02-19 PROBLEM — J01.00 ACUTE NON-RECURRENT MAXILLARY SINUSITIS: Status: ACTIVE | Noted: 2025-02-19

## 2025-02-19 LAB
ANION GAP SERPL CALCULATED.3IONS-SCNC: 17 MMOL/L (ref 4–13)
BUN SERPL-MCNC: 72 MG/DL (ref 5–25)
CALCIUM SERPL-MCNC: 7.6 MG/DL (ref 8.4–10.2)
CHLORIDE SERPL-SCNC: 91 MMOL/L (ref 96–108)
CO2 SERPL-SCNC: 22 MMOL/L (ref 21–32)
CREAT SERPL-MCNC: 9.18 MG/DL (ref 0.6–1.3)
ERYTHROCYTE [DISTWIDTH] IN BLOOD BY AUTOMATED COUNT: 14.6 % (ref 11.6–15.1)
FLUAV RNA RESP QL NAA+PROBE: POSITIVE
FLUBV RNA RESP QL NAA+PROBE: NEGATIVE
GFR SERPL CREATININE-BSD FRML MDRD: 5 ML/MIN/1.73SQ M
GLUCOSE SERPL-MCNC: 103 MG/DL (ref 65–140)
GLUCOSE SERPL-MCNC: 104 MG/DL (ref 65–140)
GLUCOSE SERPL-MCNC: 85 MG/DL (ref 65–140)
GLUCOSE SERPL-MCNC: 90 MG/DL (ref 65–140)
HCT VFR BLD AUTO: 29.1 % (ref 36.5–49.3)
HGB BLD-MCNC: 10 G/DL (ref 12–17)
MCH RBC QN AUTO: 29.4 PG (ref 26.8–34.3)
MCHC RBC AUTO-ENTMCNC: 34.4 G/DL (ref 31.4–37.4)
MCV RBC AUTO: 86 FL (ref 82–98)
PLATELET # BLD AUTO: 156 THOUSANDS/UL (ref 149–390)
PMV BLD AUTO: 10.6 FL (ref 8.9–12.7)
POTASSIUM SERPL-SCNC: 4.4 MMOL/L (ref 3.5–5.3)
RBC # BLD AUTO: 3.4 MILLION/UL (ref 3.88–5.62)
RSV RNA RESP QL NAA+PROBE: NEGATIVE
SARS-COV-2 RNA RESP QL NAA+PROBE: NEGATIVE
SODIUM SERPL-SCNC: 130 MMOL/L (ref 135–147)
WBC # BLD AUTO: 4.17 THOUSAND/UL (ref 4.31–10.16)

## 2025-02-19 PROCEDURE — 80048 BASIC METABOLIC PNL TOTAL CA: CPT | Performed by: INTERNAL MEDICINE

## 2025-02-19 PROCEDURE — 85027 COMPLETE CBC AUTOMATED: CPT | Performed by: INTERNAL MEDICINE

## 2025-02-19 PROCEDURE — 99232 SBSQ HOSP IP/OBS MODERATE 35: CPT | Performed by: STUDENT IN AN ORGANIZED HEALTH CARE EDUCATION/TRAINING PROGRAM

## 2025-02-19 PROCEDURE — 99214 OFFICE O/P EST MOD 30 MIN: CPT | Performed by: STUDENT IN AN ORGANIZED HEALTH CARE EDUCATION/TRAINING PROGRAM

## 2025-02-19 PROCEDURE — 82948 REAGENT STRIP/BLOOD GLUCOSE: CPT

## 2025-02-19 PROCEDURE — 0241U HB NFCT DS VIR RESP RNA 4 TRGT: CPT | Performed by: STUDENT IN AN ORGANIZED HEALTH CARE EDUCATION/TRAINING PROGRAM

## 2025-02-19 PROCEDURE — 71045 X-RAY EXAM CHEST 1 VIEW: CPT

## 2025-02-19 RX ORDER — DOXYCYCLINE 100 MG/1
100 CAPSULE ORAL EVERY 12 HOURS SCHEDULED
Status: DISCONTINUED | OUTPATIENT
Start: 2025-02-19 | End: 2025-02-19

## 2025-02-19 RX ORDER — GUAIFENESIN 600 MG/1
1200 TABLET, EXTENDED RELEASE ORAL EVERY 12 HOURS SCHEDULED
Status: DISCONTINUED | OUTPATIENT
Start: 2025-02-19 | End: 2025-02-21 | Stop reason: HOSPADM

## 2025-02-19 RX ORDER — OSELTAMIVIR PHOSPHATE 30 MG/1
30 CAPSULE ORAL EVERY 24 HOURS
Status: DISCONTINUED | OUTPATIENT
Start: 2025-02-19 | End: 2025-02-21 | Stop reason: HOSPADM

## 2025-02-19 RX ADMIN — NIFEDIPINE 30 MG: 30 TABLET, EXTENDED RELEASE ORAL at 17:42

## 2025-02-19 RX ADMIN — SEVELAMER HYDROCHLORIDE 800 MG: 800 TABLET, FILM COATED ORAL at 17:44

## 2025-02-19 RX ADMIN — DOXYCYCLINE 100 MG: 100 CAPSULE ORAL at 14:40

## 2025-02-19 RX ADMIN — ASPIRIN 81 MG CHEWABLE TABLET 81 MG: 81 TABLET CHEWABLE at 08:49

## 2025-02-19 RX ADMIN — OSELTAMAVIR PHOSPHATE 30 MG: 30 CAPSULE ORAL at 21:21

## 2025-02-19 RX ADMIN — Medication 2.5 MG: at 08:49

## 2025-02-19 RX ADMIN — GUAIFENESIN 1200 MG: 600 TABLET, EXTENDED RELEASE ORAL at 21:03

## 2025-02-19 RX ADMIN — Medication 2.5 MG: at 17:42

## 2025-02-19 RX ADMIN — APIXABAN 5 MG: 5 TABLET, FILM COATED ORAL at 08:48

## 2025-02-19 RX ADMIN — SEVELAMER HYDROCHLORIDE 800 MG: 800 TABLET, FILM COATED ORAL at 08:48

## 2025-02-19 RX ADMIN — ACETAMINOPHEN 650 MG: 325 TABLET ORAL at 01:15

## 2025-02-19 RX ADMIN — ALLOPURINOL 100 MG: 100 TABLET ORAL at 08:48

## 2025-02-19 RX ADMIN — ATORVASTATIN CALCIUM 80 MG: 80 TABLET, FILM COATED ORAL at 08:48

## 2025-02-19 RX ADMIN — APIXABAN 5 MG: 5 TABLET, FILM COATED ORAL at 17:42

## 2025-02-19 NOTE — ASSESSMENT & PLAN NOTE
Serum sodium 130 mmol/L  Secondary to decreased free water excretion in the settings of ESRD  UF on HD

## 2025-02-19 NOTE — ASSESSMENT & PLAN NOTE
Symptoms consistent with acute maxillary sinusitis, green/yellow discharge  In the setting of ESRD on HD recommend doxycycline  Repeat chest x-ray imaging

## 2025-02-19 NOTE — ASSESSMENT & PLAN NOTE
Lab Results   Component Value Date    HGBA1C 5.7 (H) 02/09/2025     HbA1c 5.7  Insulin as per primary team  Maintain healthy diet (vegetables, fruits, whole grains, nonfat or low fat)  Weight loss  Physical activity (5 to 10 minutes to start the increase to 30 min a day)    Recent Labs     02/18/25  1307 02/18/25  1616 02/18/25  2201 02/19/25  0720   POCGLU 113 93 124 85       Blood Sugar Average: Last 72 hrs:  (P) 103.75

## 2025-02-19 NOTE — CONSULTS
NEPHROLOGY HOSPITAL CONSULTATION   Naresh Carpio 64 y.o. male MRN: [id number]  Unit/Bed#: [address] [address] Encounter: [id number]    Brief History of Admission -  64 y.o. man  with PMH of ESRD on HD TTS, hypertension, diabetes, chronic lower extremity wounds p/w SOB and CP.  Neurologist consulted for management of ESRD    Assessment & Plan  ESRD (end stage renal disease) on dialysis ([facility])  Lab Results   Component Value Date    EGFR 5 02/19/2025    EGFR 2 02/18/2025    EGFR 4 02/11/2025    CREATININE 9.18 (H) 02/19/2025    CREATININE 14.91 (H) 02/18/2025    CREATININE 10.14 (H) 02/11/2025   #ESRD on HD TTS:  Dialysis unit/days: [facility]  Access: PermCath  Patient required urgent dialysis on admission due to severe bradycardia and hyperkalemia   No indication of dialysis today, next dialysis tomorrow if remains inpatient  Renal Diet  Fluid restriction 1-1.5L/d  Adjust medications to GFR<10  Avoid opioids     Anemia due to chronic kidney disease, on chronic dialysis (HCC)  Lab Results   Component Value Date    EGFR 5 02/19/2025    EGFR 2 02/18/2025    EGFR 4 02/11/2025    CREATININE 9.18 (H) 02/19/2025    CREATININE 14.91 (H) 02/18/2025    CREATININE 10.14 (H) 02/11/2025   Current hemoglobin: 10 mg/dL  Treatment:  Outpatient Epogen  Transfuse for hemoglobin less than 7.0 per primary service    Hyperkalemia  Hyperkalemia on admission with potassium of 6.3 and bradycardia  Require urgent dialysis  Type II diabetes mellitus (HCC)  Lab Results   Component Value Date    HGBA1C 5.7 (H) 02/09/2025     HbA1c 5.7  Insulin as per primary team  Maintain healthy diet (vegetables, fruits, whole grains, nonfat or low fat)  Weight loss  Physical activity (5 to 10 minutes to start the increase to 30 min a day)    Recent Labs     02/18/25  1307 02/18/25  1616 02/18/25  2201 02/19/25  0720   POCGLU 113 93 124 85       Blood Sugar Average: Last 72 hrs:  (P) 103.75    Hypertension    Volume: Euvolemic  Blood pressure: Normotensive, BP  137/62  Low-sodium diet  UF on HD  Nifedipine 30 mg  Hyponatremia  Serum sodium 130 mmol/L  Secondary to decreased free water excretion in the settings of ESRD  UF on HD    I have reviewed the nephrology recommendations including hyperkalemia improved, no indication of dialysis today plan for dialysis tomorrow, with primary team, and we are in agreement with renal plan including the information outlined above.    HISTORY OF PRESENT ILLNESS:  Requesting Physician: Jacek Conteh,*  Reason for Consult: Management of ESRD    Naresh Carpio is a 64 y.o. male with PMH of ESRD on HD TTS, hypertension, diabetes, chronic lower extremity wounds who was admitted to [facility] after presenting with shortness of breath and chest pain.  Patient was hyperkalemic and bradycardic. A renal consultation is requested today for assistance in the management of ESRD and hyperkalemia    PAST MEDICAL HISTORY:  Past Medical History:   Diagnosis Date    Acute cystitis 10/18/2024    Acute on chronic anemia 01/23/2022    Atrial fibrillation (HCC)     Bradycardia 09/05/2023    Diabetes mellitus (HCC)     ESRD (end stage renal disease) on dialysis (HCC)     Hematuria 10/10/2024    Hematuria 10/10/2024    Hyperkalemia 04/06/2024    Hyperkalemia 04/06/2024    Hyperlipidemia     Hypertension     Left toe amputee (HCC)     TIA (transient ischemic attack)     Toxic metabolic encephalopathy 10/08/2024       PAST SURGICAL HISTORY:  Past Surgical History:   Procedure Laterality Date    AMPUTATION Left     left foot resection 10 years ago dr tran    HEMODIALYSIS ADULT  1/18/2025    IR LOWER EXTREMITY ANGIOGRAM  08/29/2023    IR TEMPORARY DIALYSIS CATHETER PLACEMENT  08/25/2023    IR TUNNELED CENTRAL LINE REMOVAL  08/23/2023    IR TUNNELED DIALYSIS CATHETER PLACEMENT  01/27/2022    IR TUNNELED DIALYSIS CATHETER PLACEMENT  08/30/2023    SC AMPUTATION METATARSAL W/TOE SINGLE Right 8/31/2023    Procedure: RIGHT PARTIAL 1ST RAY RESECTION WITH   WOUND VAC APPLICATION;  Surgeon: Won Parmar DPM;  Location: [facility];  Service: Podiatry       ALLERGIES:  No Known Allergies    SOCIAL HISTORY:  Social History     Substance and Sexual Activity   Alcohol Use Not Currently    Alcohol/week: 0.0 standard drinks of alcohol    Comment: 0     Social History     Substance and Sexual Activity   Drug Use Never     Social History     Tobacco Use   Smoking Status Never    Passive exposure: Never   Smokeless Tobacco Never       FAMILY HISTORY:  History reviewed. No pertinent family history.    MEDICATIONS:    Current Facility-Administered Medications:     acetaminophen (TYLENOL) tablet 650 mg, 650 mg, Oral, Q6H PRN, Winsome Esquivel PA-C, 650 mg at 02/19/25 0115    allopurinol (ZYLOPRIM) tablet 100 mg, 100 mg, Oral, Daily, Winsome Esquivel PA-C, 100 mg at 02/18/25 1308    apixaban (ELIQUIS) tablet 5 mg, 5 mg, Oral, BID, Winsome Esquivel PA-C, 5 mg at 02/18/25 1743    aspirin chewable tablet 81 mg, 81 mg, Oral, Daily, Winsome Esquivel PA-C, 81 mg at 02/18/25 1308    atorvastatin (LIPITOR) tablet 80 mg, 80 mg, Oral, Daily, Winsome Esquivel PA-C, 80 mg at 02/18/25 1308    influenza vaccine, high-dose (Fluzone High-Dose) IM injection 0.5 mL, 0.5 mL, Intramuscular, Once PRN, Winsome Esquivel PA-C    insulin lispro (HumALOG/ADMELOG) 100 units/mL subcutaneous injection 1-6 Units, 1-6 Units, Subcutaneous, 4x Daily (AC & HS) **AND** Fingerstick Glucose (POCT), , , 4x Daily AC and at bedtime, Winsome Esquivel PA-C    NIFEdipine (PROCARDIA XL) 24 hr tablet 30 mg, 30 mg, Oral, After Dinner, Winsome Esquivel PA-C, 30 mg at 02/18/25 1743    oxyCODONE (ROXICODONE) split tablet 2.5 mg, 2.5 mg, Oral, Q8H PRN, Jacek Conteh DO    sevelamer (RENAGEL) tablet 800 mg, 800 mg, Oral, TID With Meals, Winsome Esquivel PA-C, 800 mg at 02/18/25 7781    REVIEW OF SYSTEMS:  Constitutional: Negative for fatigue, anorexia, fever, chills, diaphoresis  HENT: Negative for postnasal drip  Eyes: Negative for visual  disturbance.   Respiratory: Negative for cough, shortness of breath and wheezing.   Cardiovascular: Negative for chest pain, palpitations and leg swelling.   Gastrointestinal: Negative for abdominal pain, constipation, diarrhea, nausea and vomiting.   Genitourinary: No dysuria, hematuria  Endocrine: Negative for polyuria.   Musculoskeletal: Generalized pain    Skin: Negative for rash.   Neurological: Negative for focal weakness, headaches, dizziness.  Hematological: Negative for easy bruising or bleeding.  Psychiatric/Behavioral: Negative for confusion and sleep disturbance.   All the systems were reviewed and were negative except as documented on the HPI.    PHYSICAL EXAM:  Current Weight: Weight - Scale: 107 kg (235 lb)  First Weight: Weight - Scale: 111 kg (245 lb 9.5 oz)  Vitals:    02/18/25 1730 02/18/25 1800 02/18/25 1806 02/18/25 2300   BP: (!) 175/93 133/58 (!) 204/93 137/62   BP Location:   Left arm    Pulse: 65 62 67 63   Resp: 18 18 18 18   Temp:   (!) 97.1 °F (36.2 °C) 97.8 °F (36.6 °C)   TempSrc:   Tympanic Oral   SpO2:    96%   Weight:       Height:           Intake/Output Summary (Last 24 hours) at 2/19/2025 0819  Last data filed at 2/18/2025 1806  Gross per 24 hour   Intake 500 ml   Output 3448 ml   Net -2948 ml     Physical Exam  General:  no acute distress at this time  Skin:  No acute rash  Eyes:  No scleral icterus and noninjected  ENT:  mucous membranes moist  Neck:  no carotid bruits  Chest:  Clear to auscultation percussion, good respiratory effort, no use of accessory respiratory muscles  CVS:  Regular rate and rhythm without rub   Abdomen:  soft and nontender   Extremities: no significant lower extremity edema  Neuro:  No gross focality  Psych:  Alert , cooperative   Dialysis access: PermCath      Invasive Devices:      Lab Results:   Results from last 7 days   Lab Units 02/19/25  0632 02/18/25  0919   WBC Thousand/uL 4.17* 3.81*   HEMOGLOBIN g/dL 10.0* 9.7*   HEMATOCRIT % 29.1* 29.8*  "  PLATELETS Thousands/uL 156 136*   POTASSIUM mmol/L 4.4 6.3*   CHLORIDE mmol/L 91* 93*   CO2 mmol/L 22 16*   BUN mg/dL 72* 141*   CREATININE mg/dL 9.18* 14.91*   CALCIUM mg/dL 7.6* 7.3*   ALK PHOS U/L  --  99   ALT U/L  --  19   AST U/L  --  16     Other Studies:     Portions of the record may have been created with voice recognition software. Occasional wrong word or \"sound a like\" substitutions may have occurred due to the inherent limitations of voice recognition software. Read the chart carefully and recognize, using context, where substitutions have occurred.If you have any questions, please contact the dictating provider.    "

## 2025-02-19 NOTE — ASSESSMENT & PLAN NOTE
Lab Results   Component Value Date    EGFR 5 02/19/2025    EGFR 2 02/18/2025    EGFR 4 02/11/2025    CREATININE 9.18 (H) 02/19/2025    CREATININE 14.91 (H) 02/18/2025    CREATININE 10.14 (H) 02/11/2025   #ESRD on HD TTS:  Dialysis unit/days: [facility]  Access: PermCath  Patient required urgent dialysis on admission due to severe bradycardia and hyperkalemia   No indication of dialysis today, next dialysis tomorrow if remains inpatient  Renal Diet  Fluid restriction 1-1.5L/d  Adjust medications to GFR<10  Avoid opioids

## 2025-02-19 NOTE — ASSESSMENT & PLAN NOTE
Volume: Euvolemic  Blood pressure: Normotensive, /62  Low-sodium diet  UF on HD  Nifedipine 30 mg

## 2025-02-19 NOTE — PROGRESS NOTES
Progress Note - Hospitalist   Name: Naresh Carpio 64 y.o. male I MRN: [id number]  Unit/Bed#: [address] [place] [address] I Date of Admission: 2/18/2025   Date of Service: 2/19/2025 I Hospital Day: 0     Assessment & Plan  ESRD (end stage renal disease) on dialysis (HCC)  Lab Results   Component Value Date    EGFR 5 02/19/2025    EGFR 2 02/18/2025    EGFR 4 02/11/2025    CREATININE 9.18 (H) 02/19/2025    CREATININE 14.91 (H) 02/18/2025    CREATININE 10.14 (H) 02/11/2025   Patient presented to ED on 2/18/2025 for several days of shortness of breath and chest tightness. Patient reports he has not been to dialysis since he was discharged on 02/11/2025. EKG on admission without ischemic changes. CXR with small right pleural effusion. Labs showing DEVIN. Patient with increased work of breathing, rhonchi, BLE edema, and abdominal distension on exam.   Dialysis completed 2/18/2025  Continue home sevelamer 900 mg TID with meals  Renal diet, fluid restriction  CBC and BMP daily  Nephrology consulted, appreciate recommendations  Plans for HD 2/20/25, discharge afterwards  Acute on chronic diastolic (congestive) heart failure (HCC)  Wt Readings from Last 3 Encounters:   02/18/25 107 kg (235 lb)   02/11/25 108 kg (237 lb)   01/18/25 107 kg (235 lb 9.6 oz)   Plan for dialysis today  Received IV lasix 80mg x1 dose in the ED. Further volume control with HD  I's and O's  Daily weights  Anemia due to chronic kidney disease, on chronic dialysis (HCC)  Lab Results   Component Value Date    EGFR 5 02/19/2025    EGFR 2 02/18/2025    EGFR 4 02/11/2025    CREATININE 9.18 (H) 02/19/2025    CREATININE 14.91 (H) 02/18/2025    CREATININE 10.14 (H) 02/11/2025   Hgb 9.7 on admission  CBC in AM  Type II diabetes mellitus (HCC)  Lab Results   Component Value Date    HGBA1C 5.7 (H) 02/09/2025     Recent Labs     02/18/25  1616 02/18/25  2201 02/19/25  0720 02/19/25  1055   POCGLU 93 124 85 104       Glucose 131 on admission   A1C on 02/09/2025 was 5.7  Not  currently on any medications for glucose management. Manages diabetes through diet and life style modifications   Continue PTA gabapentin 100 mg BID  SSI with accu-checks   Hypertension  Likely due to missed dialysis   Continue PTA nifedipine 30 mg daily  Continue to monitor  Hypervolemia  Likely 2/2 missed dialysis  Dialysis today  On admission, LE edema and abdominal distension present   CXR 02/18/2025 with small right pleural effusion   Fluid and salt restriction   Monitor I's and O's   Nephrology consulted   Paroxysmal atrial fibrillation (HCC)  EKG on admission sinus bradycardia  Continue home Eliquis 5 mg BID  History of TIA (transient ischemic attack)  Continue PTA aspirin 81 mg BID, atorvastatin 80 mg daily  Hyponatremia  Sodium 131 on admission.  Likely 2/2 volume overload from missed dialysis  Plan for dialysis today  BMP in AM   Hyperkalemia  Potassium 6.3 on admission  EKG with no acute changes  Plan for dialysis today  BMP in AM  Metabolic acidosis  Likely 2/2 missed dialysis   BMP in AM  Gout  Continue home allopuriol 100 mg daily  Acute non-recurrent maxillary sinusitis  Symptoms consistent with acute maxillary sinusitis, green/yellow discharge  In the setting of ESRD on HD recommend doxycycline  Repeat chest x-ray imaging      VTE Pharmacologic Prophylaxis: VTE Score: 4 Moderate Risk (Score 3-4) - Pharmacological DVT Prophylaxis Ordered: apixaban (Eliquis).    Mobility:   Basic Mobility Inpatient Raw Score: 24  JH-HLM Goal: 8: Walk 250 feet or more  JH-HLM Achieved: 6: Walk 10 steps or more  JH-HLM Goal achieved. Continue to encourage appropriate mobility.    Patient Centered Rounds: I performed bedside rounds with nursing staff today.   Discussions with Specialists or Other Care Team Provider: Appreciate nephrology recommendations    Education and Discussions with Family / Patient: Discussed with patient at bedside    Current Length of Stay: 0 day(s)  Current Patient Status: Inpatient    Certification Statement: The patient will continue to require additional inpatient hospital stay due to ESRD on HD  Discharge Plan: Anticipate discharge tomorrow to home.    Code Status: Level 1 - Full Code    Subjective   Patient seen and examined at bedside this morning, overall feeling well, notes concern for sinus infection with green/yellow sputum.    Objective :  Temp:  [97.1 °F (36.2 °C)-97.8 °F (36.6 °C)] 97.3 °F (36.3 °C)  HR:  [39-67] 62  BP: (133-204)/(58-93) 146/67  Resp:  [18-20] 20  SpO2:  [96 %-99 %] 98 %  O2 Device: None (Room air)    Body mass index is 31 kg/m².     Input and Output Summary (last 24 hours):     Intake/Output Summary (Last 24 hours) at 2/19/2025 1303  Last data filed at 2/18/2025 1806  Gross per 24 hour   Intake 500 ml   Output 3448 ml   Net -2948 ml       Physical Exam  Vitals and nursing note reviewed.   Constitutional:       General: He is not in acute distress.     Appearance: He is well-developed.   HENT:      Head: Normocephalic and atraumatic.      Nose: Congestion and rhinorrhea present.   Eyes:      General: No scleral icterus.     Conjunctiva/sclera: Conjunctivae normal.   Cardiovascular:      Rate and Rhythm: Normal rate and regular rhythm.      Pulses: Normal pulses.      Heart sounds: No murmur heard.  Pulmonary:      Effort: Pulmonary effort is normal. No respiratory distress.      Breath sounds: Normal breath sounds.   Abdominal:      General: Bowel sounds are normal. There is no distension.      Palpations: Abdomen is soft.      Tenderness: There is no abdominal tenderness.   Musculoskeletal:         General: No swelling. Normal range of motion.      Cervical back: Neck supple.   Skin:     General: Skin is warm and dry.      Capillary Refill: Capillary refill takes less than 2 seconds.   Neurological:      General: No focal deficit present.      Mental Status: He is alert and oriented to person, place, and time. Mental status is at baseline.   Psychiatric:          Mood and Affect: Mood normal.         Behavior: Behavior normal.         Lines/Drains:  Lines/Drains/Airways       Active Status       Name Placement date Placement time Site Days    HD Permanent Double Catheter 08/30/23  --  Internal jugular  539                            Lab Results: I have reviewed the following results:   Results from last 7 days   Lab Units 02/19/25  0632 02/18/25  0919   WBC Thousand/uL 4.17* 3.81*   HEMOGLOBIN g/dL 10.0* 9.7*   HEMATOCRIT % 29.1* 29.8*   PLATELETS Thousands/uL 156 136*   SEGS PCT %  --  59   LYMPHO PCT %  --  25   MONO PCT %  --  12   EOS PCT %  --  2     Results from last 7 days   Lab Units 02/19/25  0632 02/18/25  0919   SODIUM mmol/L 130* 131*   POTASSIUM mmol/L 4.4 6.3*   CHLORIDE mmol/L 91* 93*   CO2 mmol/L 22 16*   BUN mg/dL 72* 141*   CREATININE mg/dL 9.18* 14.91*   ANION GAP mmol/L 17* 22*   CALCIUM mg/dL 7.6* 7.3*   ALBUMIN g/dL  --  4.0   TOTAL BILIRUBIN mg/dL  --  0.30   ALK PHOS U/L  --  99   ALT U/L  --  19   AST U/L  --  16   GLUCOSE RANDOM mg/dL 90 131         Results from last 7 days   Lab Units 02/19/25  1055 02/19/25  0720 02/18/25  2201 02/18/25  1616 02/18/25  1307   POC GLUCOSE mg/dl 104 85 124 93 113               Recent Cultures (last 7 days):           Last 24 Hours Medication List:     Current Facility-Administered Medications:     acetaminophen (TYLENOL) tablet 650 mg, Q6H PRN    allopurinol (ZYLOPRIM) tablet 100 mg, Daily    apixaban (ELIQUIS) tablet 5 mg, BID    aspirin chewable tablet 81 mg, Daily    atorvastatin (LIPITOR) tablet 80 mg, Daily    doxycycline hyclate (VIBRAMYCIN) capsule 100 mg, Q12H KEMAL    influenza vaccine, high-dose (Fluzone High-Dose) IM injection 0.5 mL, Once PRN    insulin lispro (HumALOG/ADMELOG) 100 units/mL subcutaneous injection 1-6 Units, 4x Daily (AC & HS) **AND** Fingerstick Glucose (POCT), 4x Daily AC and at bedtime    NIFEdipine (PROCARDIA XL) 24 hr tablet 30 mg, After Dinner    oxyCODONE (ROXICODONE) split tablet  2.5 mg, Q8H PRN    sevelamer (RENAGEL) tablet 800 mg, TID With Meals    Administrative Statements   Today, Patient Was Seen By: Jacek Conteh DO      **Please Note: This note may have been constructed using a voice recognition system.**

## 2025-02-19 NOTE — ASSESSMENT & PLAN NOTE
Wt Readings from Last 3 Encounters:   02/18/25 107 kg (235 lb)   02/11/25 108 kg (237 lb)   01/18/25 107 kg (235 lb 9.6 oz)   Plan for dialysis today  Received IV lasix 80mg x1 dose in the ED. Further volume control with HD  I's and O's  Daily weights

## 2025-02-19 NOTE — ASSESSMENT & PLAN NOTE
Lab Results   Component Value Date    EGFR 5 02/19/2025    EGFR 2 02/18/2025    EGFR 4 02/11/2025    CREATININE 9.18 (H) 02/19/2025    CREATININE 14.91 (H) 02/18/2025    CREATININE 10.14 (H) 02/11/2025   Current hemoglobin: 10 mg/dL  Treatment:  Outpatient Epogen  Transfuse for hemoglobin less than 7.0 per primary service

## 2025-02-19 NOTE — ASSESSMENT & PLAN NOTE
Lab Results   Component Value Date    EGFR 5 02/19/2025    EGFR 2 02/18/2025    EGFR 4 02/11/2025    CREATININE 9.18 (H) 02/19/2025    CREATININE 14.91 (H) 02/18/2025    CREATININE 10.14 (H) 02/11/2025   Patient presented to ED on 2/18/2025 for several days of shortness of breath and chest tightness. Patient reports he has not been to dialysis since he was discharged on 02/11/2025. EKG on admission without ischemic changes. CXR with small right pleural effusion. Labs showing DEVIN. Patient with increased work of breathing, rhonchi, BLE edema, and abdominal distension on exam.   Dialysis completed 2/18/2025  Continue home sevelamer 900 mg TID with meals  Renal diet, fluid restriction  CBC and BMP daily  Nephrology consulted, appreciate recommendations  Plans for HD 2/20/25, discharge afterwards

## 2025-02-19 NOTE — ASSESSMENT & PLAN NOTE
Lab Results   Component Value Date    HGBA1C 5.7 (H) 02/09/2025     Recent Labs     02/18/25  1616 02/18/25  2201 02/19/25  0720 02/19/25  1055   POCGLU 93 124 85 104       Glucose 131 on admission   A1C on 02/09/2025 was 5.7  Not currently on any medications for glucose management. Manages diabetes through diet and life style modifications   Continue PTA gabapentin 100 mg BID  SSI with accu-checks

## 2025-02-19 NOTE — ASSESSMENT & PLAN NOTE
Lab Results   Component Value Date    EGFR 5 02/19/2025    EGFR 2 02/18/2025    EGFR 4 02/11/2025    CREATININE 9.18 (H) 02/19/2025    CREATININE 14.91 (H) 02/18/2025    CREATININE 10.14 (H) 02/11/2025   Hgb 9.7 on admission  CBC in AM

## 2025-02-20 ENCOUNTER — APPOINTMENT (INPATIENT)
Dept: DIALYSIS | Facility: HOSPITAL | Age: 65
DRG: 291 | End: 2025-02-20
Payer: MEDICARE

## 2025-02-20 PROBLEM — J10.1 INFLUENZA A: Status: ACTIVE | Noted: 2025-02-20

## 2025-02-20 LAB
ALBUMIN SERPL BCG-MCNC: 3.9 G/DL (ref 3.5–5)
ALP SERPL-CCNC: 88 U/L (ref 34–104)
ALT SERPL W P-5'-P-CCNC: 22 U/L (ref 7–52)
ANION GAP SERPL CALCULATED.3IONS-SCNC: 18 MMOL/L (ref 4–13)
AST SERPL W P-5'-P-CCNC: 20 U/L (ref 13–39)
BASOPHILS # BLD AUTO: 0.01 THOUSANDS/ΜL (ref 0–0.1)
BASOPHILS NFR BLD AUTO: 0 % (ref 0–1)
BILIRUB SERPL-MCNC: 0.36 MG/DL (ref 0.2–1)
BUN SERPL-MCNC: 83 MG/DL (ref 5–25)
CALCIUM SERPL-MCNC: 7.2 MG/DL (ref 8.4–10.2)
CHLORIDE SERPL-SCNC: 89 MMOL/L (ref 96–108)
CO2 SERPL-SCNC: 22 MMOL/L (ref 21–32)
CREAT SERPL-MCNC: 10.78 MG/DL (ref 0.6–1.3)
EOSINOPHIL # BLD AUTO: 0.52 THOUSAND/ΜL (ref 0–0.61)
EOSINOPHIL NFR BLD AUTO: 9 % (ref 0–6)
ERYTHROCYTE [DISTWIDTH] IN BLOOD BY AUTOMATED COUNT: 14.6 % (ref 11.6–15.1)
GFR SERPL CREATININE-BSD FRML MDRD: 4 ML/MIN/1.73SQ M
GLUCOSE SERPL-MCNC: 105 MG/DL (ref 65–140)
GLUCOSE SERPL-MCNC: 108 MG/DL (ref 65–140)
GLUCOSE SERPL-MCNC: 117 MG/DL (ref 65–140)
GLUCOSE SERPL-MCNC: 83 MG/DL (ref 65–140)
GLUCOSE SERPL-MCNC: 85 MG/DL (ref 65–140)
GLUCOSE SERPL-MCNC: 97 MG/DL (ref 65–140)
HCT VFR BLD AUTO: 27.3 % (ref 36.5–49.3)
HGB BLD-MCNC: 9.2 G/DL (ref 12–17)
IMM GRANULOCYTES # BLD AUTO: 0.02 THOUSAND/UL (ref 0–0.2)
IMM GRANULOCYTES NFR BLD AUTO: 0 % (ref 0–2)
LYMPHOCYTES # BLD AUTO: 1.1 THOUSANDS/ΜL (ref 0.6–4.47)
LYMPHOCYTES NFR BLD AUTO: 18 % (ref 14–44)
MCH RBC QN AUTO: 29.1 PG (ref 26.8–34.3)
MCHC RBC AUTO-ENTMCNC: 33.7 G/DL (ref 31.4–37.4)
MCV RBC AUTO: 86 FL (ref 82–98)
MONOCYTES # BLD AUTO: 0.66 THOUSAND/ΜL (ref 0.17–1.22)
MONOCYTES NFR BLD AUTO: 11 % (ref 4–12)
NEUTROPHILS # BLD AUTO: 3.8 THOUSANDS/ΜL (ref 1.85–7.62)
NEUTS SEG NFR BLD AUTO: 62 % (ref 43–75)
NRBC BLD AUTO-RTO: 0 /100 WBCS
PLATELET # BLD AUTO: 190 THOUSANDS/UL (ref 149–390)
PMV BLD AUTO: 10.3 FL (ref 8.9–12.7)
POTASSIUM SERPL-SCNC: 4.7 MMOL/L (ref 3.5–5.3)
PROT SERPL-MCNC: 7.3 G/DL (ref 6.4–8.4)
RBC # BLD AUTO: 3.16 MILLION/UL (ref 3.88–5.62)
SODIUM SERPL-SCNC: 129 MMOL/L (ref 135–147)
WBC # BLD AUTO: 6.11 THOUSAND/UL (ref 4.31–10.16)

## 2025-02-20 PROCEDURE — 99232 SBSQ HOSP IP/OBS MODERATE 35: CPT

## 2025-02-20 PROCEDURE — 80053 COMPREHEN METABOLIC PANEL: CPT | Performed by: STUDENT IN AN ORGANIZED HEALTH CARE EDUCATION/TRAINING PROGRAM

## 2025-02-20 PROCEDURE — 99232 SBSQ HOSP IP/OBS MODERATE 35: CPT | Performed by: STUDENT IN AN ORGANIZED HEALTH CARE EDUCATION/TRAINING PROGRAM

## 2025-02-20 PROCEDURE — 82948 REAGENT STRIP/BLOOD GLUCOSE: CPT

## 2025-02-20 PROCEDURE — 85025 COMPLETE CBC W/AUTO DIFF WBC: CPT | Performed by: STUDENT IN AN ORGANIZED HEALTH CARE EDUCATION/TRAINING PROGRAM

## 2025-02-20 RX ORDER — OSELTAMIVIR PHOSPHATE 30 MG/1
30 CAPSULE ORAL ONCE
Qty: 1 CAPSULE | Refills: 0 | Status: SHIPPED | OUTPATIENT
Start: 2025-02-22 | End: 2025-02-22

## 2025-02-20 RX ORDER — HYDRALAZINE HYDROCHLORIDE 20 MG/ML
5 INJECTION INTRAMUSCULAR; INTRAVENOUS EVERY 6 HOURS PRN
Status: DISCONTINUED | OUTPATIENT
Start: 2025-02-20 | End: 2025-02-21 | Stop reason: HOSPADM

## 2025-02-20 RX ADMIN — GUAIFENESIN 1200 MG: 600 TABLET, EXTENDED RELEASE ORAL at 21:09

## 2025-02-20 RX ADMIN — SEVELAMER HYDROCHLORIDE 800 MG: 800 TABLET, FILM COATED ORAL at 11:46

## 2025-02-20 RX ADMIN — ASPIRIN 81 MG CHEWABLE TABLET 81 MG: 81 TABLET CHEWABLE at 09:08

## 2025-02-20 RX ADMIN — OSELTAMAVIR PHOSPHATE 30 MG: 30 CAPSULE ORAL at 21:09

## 2025-02-20 RX ADMIN — APIXABAN 5 MG: 5 TABLET, FILM COATED ORAL at 09:07

## 2025-02-20 RX ADMIN — ATORVASTATIN CALCIUM 80 MG: 80 TABLET, FILM COATED ORAL at 09:08

## 2025-02-20 RX ADMIN — ALLOPURINOL 100 MG: 100 TABLET ORAL at 09:08

## 2025-02-20 RX ADMIN — SEVELAMER HYDROCHLORIDE 800 MG: 800 TABLET, FILM COATED ORAL at 09:07

## 2025-02-20 RX ADMIN — SEVELAMER HYDROCHLORIDE 800 MG: 800 TABLET, FILM COATED ORAL at 17:44

## 2025-02-20 RX ADMIN — APIXABAN 5 MG: 5 TABLET, FILM COATED ORAL at 17:44

## 2025-02-20 RX ADMIN — NIFEDIPINE 30 MG: 30 TABLET, EXTENDED RELEASE ORAL at 17:44

## 2025-02-20 RX ADMIN — GUAIFENESIN 1200 MG: 600 TABLET, EXTENDED RELEASE ORAL at 09:08

## 2025-02-20 RX ADMIN — Medication 2.5 MG: at 11:46

## 2025-02-20 NOTE — ASSESSMENT & PLAN NOTE
Day 2/5 Tamiflu  2/19 CXR: Mild right basilar density, likely atelectasis.  Right pleural effusion has improved or resolved.  Patient stable on room air

## 2025-02-20 NOTE — ASSESSMENT & PLAN NOTE
Lab Results   Component Value Date    HGBA1C 5.7 (H) 02/09/2025     Recent Labs     02/19/25  2119 02/20/25  0801 02/20/25  1119 02/20/25  1647   POCGLU 83 85 117 108       Glucose 131 on admission   A1C on 02/09/2025 was 5.7  Not currently on any medications for glucose management. Manages diabetes through diet and life style modifications   Continue PTA gabapentin 100 mg BID  SSI with accu-checks

## 2025-02-20 NOTE — CASE MANAGEMENT
Case Management Discharge Planning Note    Patient name Naresh Carpio  Location [address] [place] [address]-* MRN [id number]  : 1960 Date 2025       Current Admission Date: 2025  Current Admission Diagnosis:ESRD (end stage renal disease) on dialysis ([facility])   Patient Active Problem List    Diagnosis Date Noted Date Diagnosed    Influenza A 2025     Acute non-recurrent maxillary sinusitis 2025     Gout 2025     Metabolic acidosis 2025     Closed fracture of proximal end of left humerus with routine healing 02/10/2025     Left shoulder pain 2025     ESRD (end stage renal disease) on dialysis ([facility]) 2025     Primary hypertension 2025     Anemia in ESRD (end-stage renal disease)  ([facility]) 2025     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, on chronic dialysis ([facility]) 2025     Renal lesion 2024     Chronic wound 2024     Acute on chronic diastolic (congestive) heart failure ([facility]) 2024     Pulmonary edema 2024     Pleural effusion 2024     Elevated troponin 2024     Hyperkalemia 2024     Fall 2024     Melena 2024     Paroxysmal atrial fibrillation ([facility])      ESRD (end stage renal disease) ([facility]) 10/25/2024     Nausea 10/20/2024     Hyponatremia 10/19/2024     Chronic kidney disease-mineral and bone disorder 10/14/2024     Falls 10/08/2024     PAD (peripheral artery disease) ([facility]) 10/08/2024     Closed fracture of right pelvis ([facility]) 2024     Bilateral sacral fracture, closed ([facility]) 2024     Difficulty with speech 2024     History of amputation of hallux ([facility]) 2024     Hypertensive urgency 2024     Facial cellulitis 2024     Constipation 2023     Cellulitis of right foot      Polymicrobial bacterial infection 2023     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 mellitus, with necrosis of bone  (HCC)      Acquired deformity of foot, right      Charcot's joint      Subacute osteomyelitis of right foot (HCC)      Open wound of right foot 08/21/2023     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 mellitus, with bone involvement without evidence of necrosis (HCC)      Diabetic ulcer of left midfoot associated with type 2 diabetes mellitus, limited to breakdown of skin (HCC)      Diabetic polyneuropathy associated with type 2 diabetes mellitus (HCC)      Diabetes mellitus type 2 with peripheral artery disease (HCC)      History of amputation of lesser toe of left foot (HCC)      Onychomycosis      Status post fall 08/19/2023     Traumatic rhabdomyolysis (HCC) 08/19/2023     Leukocytosis 08/19/2023     Osteoarthritis of left hip 07/27/2022     Severe Left Orchalgia 07/26/2022     Right shoulder pain 07/26/2022     Left hip pain 07/06/2022     Hemodialysis status ([facility])      Hypervolemia      Diarrhea 01/22/2022     Anemia due to chronic kidney disease, on chronic dialysis (HCC) 01/21/2022     Type II diabetes mellitus (HCC)      Hypertension      History of TIA (transient ischemic attack)      T10 vertebral fracture ([facility])        LOS (days): 1  Geometric Mean LOS (GMLOS) (days): 3.9  Days to GMLOS:2.8     OBJECTIVE:  Risk of Unplanned Readmission Score: 49.45         Current admission status: Inpatient   Preferred Pharmacy:   [facility] [address] [place] [address]  [address] [place] [address]  Phone: [phone] Fax: [phone]    Primary Care Provider: Jeffrey Jackson    Primary Insurance: MEDICARE  Secondary Insurance:     DISCHARGE DETAILS:    Other Referral/Resources/Interventions Provided:  Interventions: Transportation  Referral Comments: RN CM  made aware patient will require transportation at discharge. [facility] via [employer] requested for 1630 and  via [employer] confirmed for 1900. Per dialysis RN [place], patient stated 1900 is too late. RN CM  called [employer] to request an earlier time since 1900 is too late for patient and he is concerned about falling on ice. Per [employer], they will ensure patient gets into his house safely and will try to be here at the hospital earlier if possible. All related parties made aware.     Treatment Team Recommendation: Home  Discharge Destination Plan:: Home  Transport at Discharge : Wheelchair van     Number/Name of Dispatcher: [employer]  Transported by (Company and Unit #): [employer]   [phone]  ETA of Transport (Date): 02/20/25  ETA of Transport (Time): 1900      IMM Given (Date):: 02/19/25

## 2025-02-20 NOTE — DISCHARGE INSTR - OTHER ORDERS
Wound Care Plan:    1-Cleanse wound to left 1st toe, left 3rd toe and left dorsal foot with NSS & pat dry. Apply Normlgel to wounds, Adaptic cut to size of wounds & cover with 2x2 gauze. Apply 3M No Sting to left 2nd toe wound and left lateral foot wound. Apply Prevent barrier cream to intact areas and cover all wounds with rolled gauze and tape. Change 3x/week & as needed for soilage/dislodgement.  2-Apply Prevent barrier cream or equivalent to bilateral sacrum, buttock and right heel 2x/day and as needed.   3-Float heels on 2 pillows to offload pressure so heels are not in contact with mattress or pillows.  4-Use pressure redistribution cushion in chair when out of bed. Avoid prolonged sitting.  5-Moisturize skin daily with skin nourishing cream.  6-Turn/reposition every 2 hrs in bed and every hour when out of bed to chair for pressure re-distribution on skin.   7-You may follow up with Dr Parmar at [facility]. Call Central Scheduling for appointment: [phone].

## 2025-02-20 NOTE — ASSESSMENT & PLAN NOTE
Symptoms consistent with acute maxillary sinusitis, green/yellow discharge  In the setting of ESRD on HD recommend doxycycline course

## 2025-02-20 NOTE — ASSESSMENT & PLAN NOTE
Potassium 6.3 on admission  EKG with no acute changes  Plan for dialysis today  Resolved    Lab Results   Component Value Date/Time    K 4.7 02/20/2025 12:52 PM    K 4.4 02/19/2025 06:32 AM    K 6.3 () 02/18/2025 09:19 AM

## 2025-02-20 NOTE — PLAN OF CARE
Target UF Goal  2-2.5  L as tolerated. Patient dialyzing for  3.5 hours  on 2 K bath for serum K of  4.4  per protocol. Treatment plan reviewed with Dr. Reyes.   Problem: METABOLIC, FLUID AND ELECTROLYTES - ADULT  Goal: Electrolytes maintained within normal limits  Description: INTERVENTIONS:  - Monitor labs and assess patient for signs and symptoms of electrolyte imbalances  - Administer electrolyte replacement as ordered  - Monitor response to electrolyte replacements, including repeat lab results as appropriate  - Instruct patient on fluid and nutrition as appropriate  Outcome: Progressing  Goal: Fluid balance maintained  Description: INTERVENTIONS:  - Monitor labs   - Monitor I/O and WT  - Instruct patient on fluid and nutrition as appropriate  - Assess for signs & symptoms of volume excess or deficit  Outcome: Progressing   Post-Dialysis RN Treatment Note    Blood Pressure:  Pre 145/65 mm/Hg  Post 150/89 mmHg   EDW:  108 kg    Weight:  Pre 103.7 kg   Post 102.2 kg   Mode of weight measurement: Standing Scale   Volume Removed:  1500 ml    Treatment duration: 210 minutes    NS given:  No    Treatment shortened No   Medications given during Rx: None Reported   Estimated Kt/V:  None Reported   Access type: Permacath/TDC   Needle Gauge:  na   Access Issues: No    Report called to primary nurse:   Yes

## 2025-02-20 NOTE — PLAN OF CARE
Problem: METABOLIC, FLUID AND ELECTROLYTES - ADULT  Goal: Electrolytes maintained within normal limits  Description: INTERVENTIONS:  - Monitor labs and assess patient for signs and symptoms of electrolyte imbalances  - Administer electrolyte replacement as ordered  - Monitor response to electrolyte replacements, including repeat lab results as appropriate  - Instruct patient on fluid and nutrition as appropriate  Outcome: Progressing  Goal: Fluid balance maintained  Description: INTERVENTIONS:  - Monitor labs   - Monitor I/O and WT  - Instruct patient on fluid and nutrition as appropriate  - Assess for signs & symptoms of volume excess or deficit  Outcome: Progressing  Goal: Glucose maintained within target range  Description: INTERVENTIONS:  - Monitor Blood Glucose as ordered  - Assess for signs and symptoms of hyperglycemia and hypoglycemia  - Administer ordered medications to maintain glucose within target range  - Assess nutritional intake and initiate nutrition service referral as needed  Outcome: Progressing     Problem: Nutrition/Hydration-ADULT  Goal: Nutrient/Hydration intake appropriate for improving, restoring or maintaining nutritional needs  Description: Monitor and assess patient's nutrition/hydration status for malnutrition. Collaborate with interdisciplinary team and initiate plan and interventions as ordered.  Monitor patient's weight and dietary intake as ordered or per policy. Utilize nutrition screening tool and intervene as necessary. Determine patient's food preferences and provide high-protein, high-caloric foods as appropriate.     INTERVENTIONS:  - Monitor oral intake, urinary output, labs, and treatment plans  - Assess nutrition and hydration status and recommend course of action  - Evaluate amount of meals eaten  - Allow adequate time for meals  - Recommend/ encourage appropriate diets, oral nutritional supplements, and vitamin/mineral supplements  - Order, calculate, and assess calorie  counts as needed  - Assess need for intravenous fluids  - Provide specific nutrition/hydration education as appropriate  - Include patient/family/caregiver in decisions related to nutrition  Outcome: Progressing      Problem: PAIN - ADULT  Goal: Verbalizes/displays adequate comfort level or baseline comfort level  Description: Interventions:  - Encourage patient to monitor pain and request assistance  - Assess pain using appropriate pain scale  - Administer analgesics based on type and severity of pain and evaluate response  - Implement non-pharmacological measures as appropriate and evaluate response  - Consider cultural and social influences on pain and pain management  - Notify physician/advanced practitioner if interventions unsuccessful or patient reports new pain  Outcome: Progressing

## 2025-02-20 NOTE — ASSESSMENT & PLAN NOTE
Sodium 131 on admission.  Likely 2/2 volume overload from missed dialysis  Plan for dialysis today  Nephrology input appreciated    Lab Results   Component Value Date/Time    SODIUM 129 (L) 02/20/2025 12:52 PM    SODIUM 130 (L) 02/19/2025 06:32 AM    SODIUM 131 (L) 02/18/2025 09:19 AM

## 2025-02-20 NOTE — ASSESSMENT & PLAN NOTE
Lab Results   Component Value Date    EGFR 4 02/20/2025    EGFR 5 02/19/2025    EGFR 2 02/18/2025    CREATININE 10.78 (H) 02/20/2025    CREATININE 9.18 (H) 02/19/2025    CREATININE 14.91 (H) 02/18/2025   Hgb 9.7 on admission  CBC in AM

## 2025-02-20 NOTE — WOUND OSTOMY CARE
Progress Note - Wound   Naresh Carpio 64 y.o. male MRN: [id number]  Unit/Bed#: [address] [place] [address] Encounter: [id number]        Assessment:   This is a 64 year old male patient admitted on 2/18/25 with ESRD. He was AAO x 3 and agreeable to have wound visit done.     Assessment Findings:  1-Intact deep tissue pressure injuries to left 2nd toe and left lateral foot POA. Orders in place for wound care.  2-Dry callus to left submetatarsal 5 - orders in place for wound care.  3-Left dorsal foot with evolving DTPI - orders in place for wound care.  4-Left 1st toe avulsion - orders in place for wound care.  5-Left 3rd toe full thickness wound - orders in place for wound care.  6-Old IV site to left hand with large amount of old sanguinous drainage - site redressed.       Left 2nd toe intact DTPI    Left lateral foot intact DTPI    Left submetatarsal (distal) callus    Left dorsal foot with evolving DTPI    Left 1st toe avulsion    Left 3rd toe full thickness wound      Old IV site to left hand        Wound Care Plan:  1-Cleanse wound to left 1st toe, left 3rd toe and left dorsal foot with NSS & pat dry. Apply Normlgel to wounds, Adaptic cut to size of wounds & cover with 2x2 gauze. Apply 3M No Sting to left 2nd toe wound and left lateral foot wound. Apply Prevent barrier cream to intact areas and cover all wounds with rolled gauze and tape. Change every other day & as needed for soilage/dislodgement.  2-Apply Prevent barrier cream or equivalent to bilateral sacrum, buttock and right heel 2x/day and as needed.   3-Float heels on 2 pillows to offload pressure so heels are not in contact with mattress or pillows.  4-Use pressure redistribution cushion in chair when out of bed. Avoid prolonged sitting.  5-Moisturize skin daily with skin nourishing cream.  6-Turn/reposition every 2 hrs in bed and every hour when out of bed to chair for pressure re-distribution on skin.  7-Follow up with Podiatry as outpatient.    Discussed  assessment findings, and plan of care/recommendations with Monisha RAWLS.    Patient is for possible discharge tomorrow, please call or text with questions and concerns.    Recommendations written as orders.  Love Andrew MSN, RN, CWON

## 2025-02-20 NOTE — ASSESSMENT & PLAN NOTE
Lab Results   Component Value Date    EGFR 5 02/19/2025    EGFR 2 02/18/2025    EGFR 4 02/11/2025    CREATININE 9.18 (H) 02/19/2025    CREATININE 14.91 (H) 02/18/2025    CREATININE 10.14 (H) 02/11/2025   #ESRD on HD TTS:  Dialysis unit/days: [facility]  Access: PermCatsteve  Required urgent hemodialysis on admission   Plan to continue as she has per schedule, next dialysis today   Continue low potassium diet   Fluid restriction 1.5 L a day   Adjust medications to GFR<10  Avoid opioids

## 2025-02-20 NOTE — PROGRESS NOTES
Progress Note - Nephrology   Name: Naresh Carpio 64 y.o. male I MRN: [id number]  Unit/Bed#: 3 [address] I Date of Admission: 2/18/2025   Date of Service: 2/20/2025 I Hospital Day: 1     Assessment & Plan  ESRD (end stage renal disease) on dialysis ([facility])  Lab Results   Component Value Date    EGFR 5 02/19/2025    EGFR 2 02/18/2025    EGFR 4 02/11/2025    CREATININE 9.18 (H) 02/19/2025    CREATININE 14.91 (H) 02/18/2025    CREATININE 10.14 (H) 02/11/2025   #ESRD on HD TTS:  Dialysis unit/days: [facility]  Access: [facility]  Required urgent hemodialysis on admission   Plan to continue as she has per schedule, next dialysis today   Continue low potassium diet   Fluid restriction 1.5 L a day   Adjust medications to GFR<10  Avoid opioids     Anemia due to chronic kidney disease, on chronic dialysis ([facility])  Lab Results   Component Value Date    EGFR 5 02/19/2025    EGFR 2 02/18/2025    EGFR 4 02/11/2025    CREATININE 9.18 (H) 02/19/2025    CREATININE 14.91 (H) 02/18/2025    CREATININE 10.14 (H) 02/11/2025   Current hemoglobin: 10 mg/dL  Treatment:  Outpatient Epogen   transfuse for hemoglobin less than 7.0 per primary service    Hyperkalemia  Serum potassium 4.4   Low potassium diet   Type II diabetes mellitus ([facility])  Lab Results   Component Value Date    HGBA1C 5.7 (H) 02/09/2025     HbA1c 5.7  Insulin as per primary team  Maintain healthy diet (vegetables, fruits, whole grains, nonfat or low fat)  Weight loss  Physical activity (5 to 10 minutes to start the increase to 30 min a day)    Recent Labs     02/19/25  0720 02/19/25  1055 02/19/25  1624 02/19/25  2119   POCGLU 85 104 103 83       Blood Sugar Average: Last 72 hrs:  (P) [id number]    Hypertension    Volume: Hypervolemic  Blood pressure: Hypertensive, /70  Low-sodium diet  UF on HD   Nifedipine 30   Hyponatremia  Serum sodium 130 mmol/L  Secondary to decreased free water excretion in the settings of ESRD  UF on HD    I have reviewed the nephrology  recommendations including hemodialysis today, with primary team, and we are in agreement with renal plan including the information outlined above. I have discussed the above management plan in detail with the primary service.     Subjective   Brief History of Admission -   64 y.o. man  with PMH of ESRD on HD TTS, hypertension, diabetes, chronic lower extremity wounds p/w SOB and CP.  Neurologist consulted for management of ESRD       Patient complains of generalized pain, no shortness of breath, no chest pain.    Objective :  Temp:  [97.2 °F (36.2 °C)-97.7 °F (36.5 °C)] 97.7 °F (36.5 °C)  HR:  [62-64] 64  BP: (146-158)/(67-70) 154/70  Resp:  [17-20] 17  SpO2:  [98 %] 98 %    Current Weight: Weight - Scale: 107 kg (235 lb)  First Weight: Weight - Scale: 111 kg (245 lb 9.5 oz)  I/O         02/18 0701  02/19 0700 02/19 0701 02/20 0700 02/20 0701  02/21 0700    P.O.  600     I.V. (mL/kg) 500 (4.7)      Total Intake(mL/kg) 500 (4.7) 600 (5.6)     Urine (mL/kg/hr) 200 400 (0.2)     Other 3248      Total Output 3448 400     Net -2948 +200                  Physical Exam  General:  no acute distress at this time  Skin:  No acute rash  Eyes:  No scleral icterus and noninjected  ENT:  mucous membranes moist  Neck:  no carotid bruits  Chest:  Clear to auscultation percussion, good respiratory effort, no use of accessory respiratory muscles  CVS:  Regular rate and rhythm without rub   Abdomen:  soft and nontender   Extremities: no significant lower extremity edema  Neuro:  No gross focality  Psych:  Alert , cooperative  Dialysis access: PermCath    Medications:    Current Facility-Administered Medications:     acetaminophen (TYLENOL) tablet 650 mg, 650 mg, Oral, Q6H PRN, Winsome Esquivel PA-C, 650 mg at 02/19/25 0115    allopurinol (ZYLOPRIM) tablet 100 mg, 100 mg, Oral, Daily, Winsome Esquivel PA-C, 100 mg at 02/19/25 0848    apixaban (ELIQUIS) tablet 5 mg, 5 mg, Oral, BID, Winsome Esquivel PA-C, 5 mg at 02/19/25 174    aspirin  "chewable tablet 81 mg, 81 mg, Oral, Daily, Winsome Esquivel PA-C, 81 mg at 02/19/25 0849    atorvastatin (LIPITOR) tablet 80 mg, 80 mg, Oral, Daily, Winsome Esquivel PA-C, 80 mg at 02/19/25 0848    guaiFENesin (MUCINEX) 12 hr tablet 1,200 mg, 1,200 mg, Oral, Q12H [facility], GRANT Rich, 1,200 mg at 02/19/25 2103    influenza vaccine, high-dose (Fluzone High-Dose) IM injection 0.5 mL, 0.5 mL, Intramuscular, Once PRN, Winsome Esquivel PA-C    insulin lispro (HumALOG/ADMELOG) 100 units/mL subcutaneous injection 1-6 Units, 1-6 Units, Subcutaneous, 4x Daily (AC & HS) **AND** Fingerstick Glucose (POCT), , , 4x Daily AC and at bedtime, Winsome Esquivel PA-C    NIFEdipine (PROCARDIA XL) 24 hr tablet 30 mg, 30 mg, Oral, After Dinner, Winsome Esquivel PA-C, 30 mg at 02/19/25 1742    oseltamivir (TAMIFLU) capsule 30 mg, 30 mg, Oral, Q24H, Jacek Conteh DO, 30 mg at 02/19/25 2121    oxyCODONE (ROXICODONE) split tablet 2.5 mg, 2.5 mg, Oral, Q8H PRN, Jacek Conteh DO, 2.5 mg at 02/19/25 1742    sevelamer (RENAGEL) tablet 800 mg, 800 mg, Oral, TID With Meals, Winsome Esquivel PA-C, 800 mg at 02/19/25 1744      Lab Results: I have reviewed the following results:  Results from last 7 days   Lab Units 02/19/25  0632 02/18/25  0919   WBC Thousand/uL 4.17* 3.81*   HEMOGLOBIN g/dL 10.0* 9.7*   HEMATOCRIT % 29.1* 29.8*   PLATELETS Thousands/uL 156 136*   POTASSIUM mmol/L 4.4 6.3*   CHLORIDE mmol/L 91* 93*   CO2 mmol/L 22 16*   BUN mg/dL 72* 141*   CREATININE mg/dL 9.18* 14.91*   CALCIUM mg/dL 7.6* 7.3*   ALBUMIN g/dL  --  4.0       Administrative Statements     Portions of the record may have been created with voice recognition software. Occasional wrong word or \"sound a like\" substitutions may have occurred due to the inherent limitations of voice recognition software. Read the chart carefully and recognize, using context, where substitutions have occurred.If you have any questions, please contact the dictating provider.  "

## 2025-02-20 NOTE — PLAN OF CARE
Problem: METABOLIC, FLUID AND ELECTROLYTES - ADULT  Goal: Electrolytes maintained within normal limits  Description: INTERVENTIONS:  - Monitor labs and assess patient for signs and symptoms of electrolyte imbalances  - Administer electrolyte replacement as ordered  - Monitor response to electrolyte replacements, including repeat lab results as appropriate  - Instruct patient on fluid and nutrition as appropriate  Outcome: Progressing  Goal: Fluid balance maintained  Description: INTERVENTIONS:  - Monitor labs   - Monitor I/O and WT  - Instruct patient on fluid and nutrition as appropriate  - Assess for signs & symptoms of volume excess or deficit  Outcome: Progressing  Goal: Glucose maintained within target range  Description: INTERVENTIONS:  - Monitor Blood Glucose as ordered  - Assess for signs and symptoms of hyperglycemia and hypoglycemia  - Administer ordered medications to maintain glucose within target range  - Assess nutritional intake and initiate nutrition service referral as needed  Outcome: Progressing     Problem: Nutrition/Hydration-ADULT  Goal: Nutrient/Hydration intake appropriate for improving, restoring or maintaining nutritional needs  Description: Monitor and assess patient's nutrition/hydration status for malnutrition. Collaborate with interdisciplinary team and initiate plan and interventions as ordered.  Monitor patient's weight and dietary intake as ordered or per policy. Utilize nutrition screening tool and intervene as necessary. Determine patient's food preferences and provide high-protein, high-caloric foods as appropriate.     INTERVENTIONS:  - Monitor oral intake, urinary output, labs, and treatment plans  - Assess nutrition and hydration status and recommend course of action  - Evaluate amount of meals eaten  - Assist patient with eating if necessary   - Allow adequate time for meals  - Recommend/ encourage appropriate diets, oral nutritional supplements, and vitamin/mineral  supplements  - Order, calculate, and assess calorie counts as needed  - Recommend, monitor, and adjust tube feedings and TPN/PPN based on assessed needs  - Assess need for intravenous fluids  - Provide specific nutrition/hydration education as appropriate  - Include patient/family/caregiver in decisions related to nutrition  Outcome: Progressing

## 2025-02-20 NOTE — ASSESSMENT & PLAN NOTE
Lab Results   Component Value Date    EGFR 4 02/20/2025    EGFR 5 02/19/2025    EGFR 2 02/18/2025    CREATININE 10.78 (H) 02/20/2025    CREATININE 9.18 (H) 02/19/2025    CREATININE 14.91 (H) 02/18/2025   Patient presented to ED on 2/18/2025 for several days of shortness of breath and chest tightness. Patient reports he has not been to dialysis since he was discharged on 02/11/2025. EKG on admission without ischemic changes. CXR with small right pleural effusion. Labs showing DEVIN. Patient with increased work of breathing, rhonchi, BLE edema, and abdominal distension on exam.   Dialysis completed 2/18/2025  Continue home sevelamer 900 mg TID with meals  Renal diet, fluid restriction  CBC and BMP daily  Nephrology consulted, appreciate recommendations  Plans for HD 2/20/25, discharge afterwards

## 2025-02-20 NOTE — ASSESSMENT & PLAN NOTE
Lab Results   Component Value Date    HGBA1C 5.7 (H) 02/09/2025     HbA1c 5.7  Insulin as per primary team  Maintain healthy diet (vegetables, fruits, whole grains, nonfat or low fat)  Weight loss  Physical activity (5 to 10 minutes to start the increase to 30 min a day)    Recent Labs     02/19/25  0720 02/19/25  1055 02/19/25  1624 02/19/25  2119   POCGLU 85 104 103 83       Blood Sugar Average: Last 72 hrs:  (P) [id number]

## 2025-02-20 NOTE — DISCHARGE INSTR - AVS FIRST PAGE
Dear Naresh Carpio,     It was our pleasure to care for you here at [facility].  It is our hope that we were always able to exceed the expected standards for your care during your stay.  You were hospitalized due to ESRD on hemodialysis.  You were cared for on the third floor by Winsome Esquivel PA-C under the service of Lore Silver DO with the [facility] who covers for your primary care physician (PCP), Jeffrey Jackson, while you were hospitalized.  If you have any questions or concerns related to this hospitalization, you may contact us at .  For follow up as well as any medication refills, we recommend that you follow up with your primary care physician.  A registered nurse will reach out to you by phone within a few days after your discharge to answer any additional questions that you may have after going home.  However, at this time we provide for you here, the most important instructions / recommendations at discharge:     Notable Medication Adjustments -   Take Oseltamivir (Tamiflu) 30 mg one time after hemodialysis on Saturday, 2/21/2025  Testing Required after Discharge -   None  ** Please contact your PCP to request testing orders for any of the testing recommended here **  Important follow up information -   Please continue hemodialysis as scheduled  Please follow up with PCP in 1-2 weeks  Please follow up with podiatry in 2 weeks  Please follow-up with wound care in 1 weeks  Other Instructions -   Please continue hemodialysis as scheduled  Please review this entire after visit summary as additional general instructions including medication list, appointments, activity, diet, any pertinent wound care, and other additional recommendations from your care team that may be provided for you.      Sincerely,     Winsome Esquivel PA-C

## 2025-02-20 NOTE — ASSESSMENT & PLAN NOTE
Volume: Hypervolemic  Blood pressure: Hypertensive, /70  Low-sodium diet  UF on HD   Nifedipine 30

## 2025-02-21 VITALS
SYSTOLIC BLOOD PRESSURE: 154 MMHG | RESPIRATION RATE: 20 BRPM | HEIGHT: 73 IN | HEART RATE: 68 BPM | OXYGEN SATURATION: 98 % | WEIGHT: 235 LBS | TEMPERATURE: 98.1 F | BODY MASS INDEX: 31.14 KG/M2 | DIASTOLIC BLOOD PRESSURE: 69 MMHG

## 2025-02-21 PROBLEM — E87.5 HYPERKALEMIA: Status: RESOLVED | Noted: 2024-11-06 | Resolved: 2025-02-21

## 2025-02-21 PROBLEM — E87.20 METABOLIC ACIDOSIS: Status: RESOLVED | Noted: 2025-02-11 | Resolved: 2025-02-21

## 2025-02-21 LAB
ANION GAP SERPL CALCULATED.3IONS-SCNC: 15 MMOL/L (ref 4–13)
BUN SERPL-MCNC: 41 MG/DL (ref 5–25)
CALCIUM SERPL-MCNC: 7.6 MG/DL (ref 8.4–10.2)
CHLORIDE SERPL-SCNC: 92 MMOL/L (ref 96–108)
CO2 SERPL-SCNC: 23 MMOL/L (ref 21–32)
CREAT SERPL-MCNC: 6.83 MG/DL (ref 0.6–1.3)
ERYTHROCYTE [DISTWIDTH] IN BLOOD BY AUTOMATED COUNT: 14.3 % (ref 11.6–15.1)
GFR SERPL CREATININE-BSD FRML MDRD: 7 ML/MIN/1.73SQ M
GLUCOSE SERPL-MCNC: 117 MG/DL (ref 65–140)
GLUCOSE SERPL-MCNC: 73 MG/DL (ref 65–140)
GLUCOSE SERPL-MCNC: 79 MG/DL (ref 65–140)
HCT VFR BLD AUTO: 26 % (ref 36.5–49.3)
HGB BLD-MCNC: 8.5 G/DL (ref 12–17)
MCH RBC QN AUTO: 28.2 PG (ref 26.8–34.3)
MCHC RBC AUTO-ENTMCNC: 32.7 G/DL (ref 31.4–37.4)
MCV RBC AUTO: 86 FL (ref 82–98)
PLATELET # BLD AUTO: 175 THOUSANDS/UL (ref 149–390)
PMV BLD AUTO: 10.2 FL (ref 8.9–12.7)
POTASSIUM SERPL-SCNC: 4 MMOL/L (ref 3.5–5.3)
RBC # BLD AUTO: 3.01 MILLION/UL (ref 3.88–5.62)
SODIUM SERPL-SCNC: 130 MMOL/L (ref 135–147)
WBC # BLD AUTO: 5.17 THOUSAND/UL (ref 4.31–10.16)

## 2025-02-21 PROCEDURE — 97161 PT EVAL LOW COMPLEX 20 MIN: CPT

## 2025-02-21 PROCEDURE — 99239 HOSP IP/OBS DSCHRG MGMT >30: CPT | Performed by: INTERNAL MEDICINE

## 2025-02-21 PROCEDURE — 85027 COMPLETE CBC AUTOMATED: CPT

## 2025-02-21 PROCEDURE — 80048 BASIC METABOLIC PNL TOTAL CA: CPT

## 2025-02-21 PROCEDURE — 99232 SBSQ HOSP IP/OBS MODERATE 35: CPT | Performed by: STUDENT IN AN ORGANIZED HEALTH CARE EDUCATION/TRAINING PROGRAM

## 2025-02-21 PROCEDURE — 82948 REAGENT STRIP/BLOOD GLUCOSE: CPT

## 2025-02-21 RX ADMIN — ALLOPURINOL 100 MG: 100 TABLET ORAL at 08:24

## 2025-02-21 RX ADMIN — GUAIFENESIN 1200 MG: 600 TABLET, EXTENDED RELEASE ORAL at 08:24

## 2025-02-21 RX ADMIN — ASPIRIN 81 MG CHEWABLE TABLET 81 MG: 81 TABLET CHEWABLE at 08:24

## 2025-02-21 RX ADMIN — APIXABAN 5 MG: 5 TABLET, FILM COATED ORAL at 08:24

## 2025-02-21 RX ADMIN — SEVELAMER HYDROCHLORIDE 800 MG: 800 TABLET, FILM COATED ORAL at 08:24

## 2025-02-21 RX ADMIN — ATORVASTATIN CALCIUM 80 MG: 80 TABLET, FILM COATED ORAL at 08:24

## 2025-02-21 NOTE — ASSESSMENT & PLAN NOTE
Lab Results   Component Value Date    EGFR 7 02/21/2025    EGFR 4 02/20/2025    EGFR 5 02/19/2025    CREATININE 6.83 (H) 02/21/2025    CREATININE 10.78 (H) 02/20/2025    CREATININE 9.18 (H) 02/19/2025   #ESRD on HD TTS:  Dialysis unit/days: [facility]  Access: PermCath  Plan to continue HD as per schedule, next dialysis tomorrow   UF as tolerated  Continue fluid restriction 1.5 to 1.8 L a day   Avoid opioids   Continue outpatient dialysis unit on discharge  Stable for discharge from my end

## 2025-02-21 NOTE — PLAN OF CARE
Problem: METABOLIC, FLUID AND ELECTROLYTES - ADULT  Goal: Electrolytes maintained within normal limits  Description: INTERVENTIONS:  - Monitor labs and assess patient for signs and symptoms of electrolyte imbalances  - Administer electrolyte replacement as ordered  - Monitor response to electrolyte replacements, including repeat lab results as appropriate  - Instruct patient on fluid and nutrition as appropriate  Outcome: Progressing  Goal: Fluid balance maintained  Description: INTERVENTIONS:  - Monitor labs   - Monitor I/O and WT  - Instruct patient on fluid and nutrition as appropriate  - Assess for signs & symptoms of volume excess or deficit  Outcome: Progressing  Goal: Glucose maintained within target range  Description: INTERVENTIONS:  - Monitor Blood Glucose as ordered  - Assess for signs and symptoms of hyperglycemia and hypoglycemia  - Administer ordered medications to maintain glucose within target range  - Assess nutritional intake and initiate nutrition service referral as needed  Outcome: Progressing     Problem: Nutrition/Hydration-ADULT  Goal: Nutrient/Hydration intake appropriate for improving, restoring or maintaining nutritional needs  Description: Monitor and assess patient's nutrition/hydration status for malnutrition. Collaborate with interdisciplinary team and initiate plan and interventions as ordered.  Monitor patient's weight and dietary intake as ordered or per policy. Utilize nutrition screening tool and intervene as necessary. Determine patient's food preferences and provide high-protein, high-caloric foods as appropriate.     INTERVENTIONS:  - Monitor oral intake, urinary output, labs, and treatment plans  - Assess nutrition and hydration status and recommend course of action  - Evaluate amount of meals eaten  - Assist patient with eating if necessary   - Allow adequate time for meals  - Recommend/ encourage appropriate diets, oral nutritional supplements, and vitamin/mineral  supplements  - Order, calculate, and assess calorie counts as needed  - Recommend, monitor, and adjust tube feedings and TPN/PPN based on assessed needs  - Assess need for intravenous fluids  - Provide specific nutrition/hydration education as appropriate  - Include patient/family/caregiver in decisions related to nutrition  Outcome: Progressing     Problem: PAIN - ADULT  Goal: Verbalizes/displays adequate comfort level or baseline comfort level  Description: Interventions:  - Encourage patient to monitor pain and request assistance  - Assess pain using appropriate pain scale  - Administer analgesics based on type and severity of pain and evaluate response  - Implement non-pharmacological measures as appropriate and evaluate response  - Consider cultural and social influences on pain and pain management  - Notify physician/advanced practitioner if interventions unsuccessful or patient reports new pain  Outcome: Progressing     Problem: RESPIRATORY - ADULT  Goal: Achieves optimal ventilation and oxygenation  Description: INTERVENTIONS:  - Assess for changes in respiratory status  - Assess for changes in mentation and behavior  - Position to facilitate oxygenation and minimize respiratory effort  - Oxygen administered by appropriate delivery if ordered  - Initiate smoking cessation education as indicated  - Encourage broncho-pulmonary hygiene including cough, deep breathe, Incentive Spirometry  - Assess the need for suctioning and aspirate as needed  - Assess and instruct to report SOB or any respiratory difficulty  - Respiratory Therapy support as indicated  Outcome: Progressing

## 2025-02-21 NOTE — PROGRESS NOTES
Progress Note - Nephrology   Name: Naresh Carpio 64 y.o. male I MRN: [id number]  Unit/Bed#: 3 [place] 329-01 I Date of Admission: 2/18/2025   Date of Service: 2/21/2025 I Hospital Day: 2     Assessment & Plan  ESRD (end stage renal disease) on dialysis (HCC)  Lab Results   Component Value Date    EGFR 7 02/21/2025    EGFR 4 02/20/2025    EGFR 5 02/19/2025    CREATININE 6.83 (H) 02/21/2025    CREATININE 10.78 (H) 02/20/2025    CREATININE 9.18 (H) 02/19/2025   #ESRD on HD TTS:  Dialysis unit/days: [facility]  Access: PermCath  Plan to continue HD as per schedule, next dialysis tomorrow   UF as tolerated  Continue fluid restriction 1.5 to 1.8 L a day   Avoid opioids   Continue outpatient dialysis unit on discharge  Stable for discharge from my end    Anemia due to chronic kidney disease, on chronic dialysis (HCC)  Lab Results   Component Value Date    EGFR 7 02/21/2025    EGFR 4 02/20/2025    EGFR 5 02/19/2025    CREATININE 6.83 (H) 02/21/2025    CREATININE 10.78 (H) 02/20/2025    CREATININE 9.18 (H) 02/19/2025   Current hemoglobin: 8.5mg/dL trending  Treatment:  Epogen tomorrow if remains inpatient  transfuse for hemoglobin less than 7.0 per primary service    Hyperkalemia (Resolved: 2/21/2025)  Resolved  Type II diabetes mellitus (HCC)  Lab Results   Component Value Date    HGBA1C 5.7 (H) 02/09/2025     HbA1c 5.7  Insulin as per primary team  Maintain healthy diet (vegetables, fruits, whole grains, nonfat or low fat)  Weight loss  Physical activity (5 to 10 minutes to start the increase to 30 min a day)    Recent Labs     02/20/25  0801 02/20/25  1119 02/20/25  1647 02/20/25 2049   POCGLU 85 117 108 97       Blood Sugar Average: Last 72 hrs:  (P) [id number]    Hypertension    Volume: Euvolemic  Blood pressure: Hypertensive, /69  Low-sodium diet  UF on HD   Nifedipine 30   Hyponatremia  Serum sodium 130 mmol/L  Secondary to decreased free water excretion in the settings of ESRD   Ultrafiltration on  dialysis     I have reviewed the nephrology recommendations including hemodialysis tomorrow if remains inpatient, with primary team, and we are in agreement with renal plan including the information outlined above. I have discussed the above management plan in detail with the primary service.     Subjective   Brief History of Admission - 64 y.o. man with PMH of ESRD on HD TTS, hypertension, diabetes, chronic lower extremity wounds p/w SOB and CP. Neurologist consulted for management of ESRD     Patient had dialysis yesterday, well-tolerated.  Patient complains of generalized bodyaches    Objective :  Temp:  [97.4 °F (36.3 °C)-98.1 °F (36.7 °C)] 98.1 °F (36.7 °C)  HR:  [60-73] 68  BP: (145-178)/(65-89) 154/69  Resp:  [18-20] 20  SpO2:  [97 %-98 %] 98 %  O2 Device: None (Room air)    Current Weight: Weight - Scale: 107 kg (235 lb)  First Weight: Weight - Scale: 111 kg (245 lb 9.5 oz)  I/O         02/19 0701  02/20 0700 02/20 0701  02/21 0700 02/21 0701  02/22 0700    P.O. 600 415     I.V. (mL/kg)  500 (4.7)     Total Intake(mL/kg) 600 (5.6) 915 (8.6)     Urine (mL/kg/hr) 400 (0.2) 200 (0.1)     Other  2000     Total Output 400 2200     Net +200 -1285                  Physical Exam  General:  no acute distress at this time  Skin:  No acute rash  Eyes:  No scleral icterus and noninjected  ENT:  mucous membranes moist  Neck:  no carotid bruits  Chest:  Clear to auscultation percussion, good respiratory effort, no use of accessory respiratory muscles  CVS:  Regular rate and rhythm without rub   Abdomen:  soft and nontender   Extremities: no significant lower extremity edema  Neuro:  No gross focality  Psych:  Alert , cooperative   Dialysis access: PermCath    Medications:    Current Facility-Administered Medications:     acetaminophen (TYLENOL) tablet 650 mg, 650 mg, Oral, Q6H PRN, Winsome Esquivel PA-C, 650 mg at 02/19/25 0115    allopurinol (ZYLOPRIM) tablet 100 mg, 100 mg, Oral, Daily, Winsome Esquivel PA-C, 100 mg at  02/20/25 0908    apixaban (ELIQUIS) tablet 5 mg, 5 mg, Oral, BID, Winsome Esquivel PA-C, 5 mg at 02/20/25 1744    aspirin chewable tablet 81 mg, 81 mg, Oral, Daily, Winsome Esquivel PA-C, 81 mg at 02/20/25 0908    atorvastatin (LIPITOR) tablet 80 mg, 80 mg, Oral, Daily, Winsome Esquivel PA-C, 80 mg at 02/20/25 0908    guaiFENesin (MUCINEX) 12 hr tablet 1,200 mg, 1,200 mg, Oral, Q12H KEMAL, GRANT Rich, 1,200 mg at 02/20/25 2109    hydrALAZINE (APRESOLINE) injection 5 mg, 5 mg, Intravenous, Q6H PRN, GRANT Gutierrez    influenza vaccine, high-dose (Fluzone High-Dose) IM injection 0.5 mL, 0.5 mL, Intramuscular, Once PRN, Winsome Esquivel PA-C    insulin lispro (HumALOG/ADMELOG) 100 units/mL subcutaneous injection 1-6 Units, 1-6 Units, Subcutaneous, 4x Daily (AC & HS) **AND** Fingerstick Glucose (POCT), , , 4x Daily AC and at bedtime, Winsome Esquivel PA-C    NIFEdipine (PROCARDIA XL) 24 hr tablet 30 mg, 30 mg, Oral, After Dinner, Winsome Esquivel PA-C, 30 mg at 02/20/25 1744    oseltamivir (TAMIFLU) capsule 30 mg, 30 mg, Oral, Q24H, Jacek Conteh DO, 30 mg at 02/20/25 2109    sevelamer (RENAGEL) tablet 800 mg, 800 mg, Oral, TID With Meals, Winsome Esquivel PA-C, 800 mg at 02/20/25 1744    trimethobenzamide (TIGAN) IM injection 200 mg, 200 mg, Intramuscular, Q6H PRN, GRANT Gutierrez      Lab Results: I have reviewed the following results:  Results from last 7 days   Lab Units 02/21/25  0521 02/20/25  1252 02/19/25  0632 02/18/25  0919   WBC Thousand/uL 5.17 6.11 4.17* 3.81*   HEMOGLOBIN g/dL 8.5* 9.2* 10.0* 9.7*   HEMATOCRIT % 26.0* 27.3* 29.1* 29.8*   PLATELETS Thousands/uL 175 190 156 136*   POTASSIUM mmol/L 4.0 4.7 4.4 6.3*   CHLORIDE mmol/L 92* 89* 91* 93*   CO2 mmol/L 23 22 22 16*   BUN mg/dL 41* 83* 72* 141*   CREATININE mg/dL 6.83* 10.78* 9.18* 14.91*   CALCIUM mg/dL 7.6* 7.2* 7.6* 7.3*   ALBUMIN g/dL  --  3.9  --  4.0       Administrative Statements     Portions of the record may have been created  "with voice recognition software. Occasional wrong word or \"sound a like\" substitutions may have occurred due to the inherent limitations of voice recognition software. Read the chart carefully and recognize, using context, where substitutions have occurred.If you have any questions, please contact the dictating provider.  "

## 2025-02-21 NOTE — ASSESSMENT & PLAN NOTE
Lab Results   Component Value Date    EGFR 7 02/21/2025    EGFR 4 02/20/2025    EGFR 5 02/19/2025    CREATININE 6.83 (H) 02/21/2025    CREATININE 10.78 (H) 02/20/2025    CREATININE 9.18 (H) 02/19/2025   Current hemoglobin: 8.5mg/dL trending  Treatment:  Epogen tomorrow if remains inpatient  transfuse for hemoglobin less than 7.0 per primary service

## 2025-02-21 NOTE — PLAN OF CARE
Problem: METABOLIC, FLUID AND ELECTROLYTES - ADULT  Goal: Electrolytes maintained within normal limits  Description: INTERVENTIONS:  - Monitor labs and assess patient for signs and symptoms of electrolyte imbalances  - Administer electrolyte replacement as ordered  - Monitor response to electrolyte replacements, including repeat lab results as appropriate  - Instruct patient on fluid and nutrition as appropriate  Outcome: Progressing  Goal: Fluid balance maintained  Description: INTERVENTIONS:  - Monitor labs   - Monitor I/O and WT  - Instruct patient on fluid and nutrition as appropriate  - Assess for signs & symptoms of volume excess or deficit  Outcome: Progressing  Goal: Glucose maintained within target range  Description: INTERVENTIONS:  - Monitor Blood Glucose as ordered  - Assess for signs and symptoms of hyperglycemia and hypoglycemia  - Administer ordered medications to maintain glucose within target range  - Assess nutritional intake and initiate nutrition service referral as needed  Outcome: Progressing     Problem: RESPIRATORY - ADULT  Goal: Achieves optimal ventilation and oxygenation  Description: INTERVENTIONS:  - Assess for changes in respiratory status  - Assess for changes in mentation and behavior  - Position to facilitate oxygenation and minimize respiratory effort  - Oxygen administered by appropriate delivery if ordered  - Initiate smoking cessation education as indicated  - Encourage broncho-pulmonary hygiene including cough, deep breathe, Incentive Spirometry  - Assess the need for suctioning and aspirate as needed  - Assess and instruct to report SOB or any respiratory difficulty  - Respiratory Therapy support as indicated  Outcome: Progressing     Problem: Nutrition/Hydration-ADULT  Goal: Nutrient/Hydration intake appropriate for improving, restoring or maintaining nutritional needs  Description: Monitor and assess patient's nutrition/hydration status for malnutrition. Collaborate with  interdisciplinary team and initiate plan and interventions as ordered.  Monitor patient's weight and dietary intake as ordered or per policy. Utilize nutrition screening tool and intervene as necessary. Determine patient's food preferences and provide high-protein, high-caloric foods as appropriate.     INTERVENTIONS:  - Monitor oral intake, urinary output, labs, and treatment plans  - Assess nutrition and hydration status and recommend course of action  - Evaluate amount of meals eaten  - Assist patient with eating if necessary   - Allow adequate time for meals  - Recommend/ encourage appropriate diets, oral nutritional supplements, and vitamin/mineral supplements  - Order, calculate, and assess calorie counts as needed  - Recommend, monitor, and adjust tube feedings and TPN/PPN based on assessed needs  - Assess need for intravenous fluids  - Provide specific nutrition/hydration education as appropriate  - Include patient/family/caregiver in decisions related to nutrition  Outcome: Progressing     Problem: PAIN - ADULT  Goal: Verbalizes/displays adequate comfort level or baseline comfort level  Description: Interventions:  - Encourage patient to monitor pain and request assistance  - Assess pain using appropriate pain scale  - Administer analgesics based on type and severity of pain and evaluate response  - Implement non-pharmacological measures as appropriate and evaluate response  - Consider cultural and social influences on pain and pain management  - Notify physician/advanced practitioner if interventions unsuccessful or patient reports new pain  Outcome: Progressing      lack of knowledge regarding managing health concern/unmanaged DM1

## 2025-02-21 NOTE — ASSESSMENT & PLAN NOTE
Lab Results   Component Value Date    HGBA1C 5.7 (H) 02/09/2025     Recent Labs     02/20/25  1119 02/20/25  1647 02/20/25 2049 02/21/25  0812   POCGLU 117 108 97 79       Glucose 131 on admission   A1C on 02/09/2025 was 5.7  Not currently on any medications for glucose management. Manages diabetes through diet and life style modifications   Continue PTA gabapentin 100 mg BID  SSI with accu-checks

## 2025-02-21 NOTE — ASSESSMENT & PLAN NOTE
Wt Readings from Last 3 Encounters:   02/18/25 107 kg (235 lb)   02/11/25 108 kg (237 lb)   01/18/25 107 kg (235 lb 9.6 oz)     Received IV lasix 80mg x1 dose in the ED. Further volume control with HD  I's and O's  Daily weights

## 2025-02-21 NOTE — ASSESSMENT & PLAN NOTE
Volume: Euvolemic  Blood pressure: Hypertensive, /69  Low-sodium diet  UF on HD   Nifedipine 30

## 2025-02-21 NOTE — ASSESSMENT & PLAN NOTE
Lab Results   Component Value Date    HGBA1C 5.7 (H) 02/09/2025     HbA1c 5.7  Insulin as per primary team  Maintain healthy diet (vegetables, fruits, whole grains, nonfat or low fat)  Weight loss  Physical activity (5 to 10 minutes to start the increase to 30 min a day)    Recent Labs     02/20/25  0801 02/20/25  1119 02/20/25  1647 02/20/25 2049   POCGLU 85 117 108 97       Blood Sugar Average: Last 72 hrs:  (P) [id number]

## 2025-02-21 NOTE — NURSING NOTE
Pt. Received his discharge instructions. Patient educated on new/ stopped medications, follow up appointments, diet, diabetes management, dialysis, and DVT preventions. Pt. Expressed understanding. IV removed. Pt. Left the unit accompanied by CURLY Lantigua and security team.

## 2025-02-21 NOTE — PROGRESS NOTES
Progress Note - Hospitalist   Name: Naresh Carpio 64 y.o. male I MRN: [id number]  Unit/Bed#: [address] [place] [address] I Date of Admission: 2/18/2025   Date of Service: 2/20/2025 I Hospital Day: 1      Addendum: Planned for discharge home today.  Notified by nursing this afternoon that patient threw up and felt dizzy.  Vital signs stable.  Tigan ordered and transport canceled to monitor patient overnight.  Assessment & Plan  ESRD (end stage renal disease) on dialysis (HCC)  Lab Results   Component Value Date    EGFR 4 02/20/2025    EGFR 5 02/19/2025    EGFR 2 02/18/2025    CREATININE 10.78 (H) 02/20/2025    CREATININE 9.18 (H) 02/19/2025    CREATININE 14.91 (H) 02/18/2025   Patient presented to ED on 2/18/2025 for several days of shortness of breath and chest tightness. Patient reports he has not been to dialysis since he was discharged on 02/11/2025. EKG on admission without ischemic changes. CXR with small right pleural effusion. Labs showing DEVIN. Patient with increased work of breathing, rhonchi, BLE edema, and abdominal distension on exam.   Dialysis completed 2/18/2025  Continue home sevelamer 900 mg TID with meals  Renal diet, fluid restriction  CBC and BMP daily  Nephrology consulted, appreciate recommendations  Plans for HD 2/20/25, discharge afterwards  Acute on chronic diastolic (congestive) heart failure (HCC)  Wt Readings from Last 3 Encounters:   02/18/25 107 kg (235 lb)   02/11/25 108 kg (237 lb)   01/18/25 107 kg (235 lb 9.6 oz)   Plan for dialysis today  Received IV lasix 80mg x1 dose in the ED. Further volume control with HD  I's and O's  Daily weights  Anemia due to chronic kidney disease, on chronic dialysis (HCC)  Lab Results   Component Value Date    EGFR 4 02/20/2025    EGFR 5 02/19/2025    EGFR 2 02/18/2025    CREATININE 10.78 (H) 02/20/2025    CREATININE 9.18 (H) 02/19/2025    CREATININE 14.91 (H) 02/18/2025   Hgb 9.7 on admission  CBC in AM  Type II diabetes mellitus (HCC)  Lab Results   Component  Value Date    HGBA1C 5.7 (H) 02/09/2025     Recent Labs     02/19/25  2119 02/20/25  0801 02/20/25  1119 02/20/25  1647   POCGLU 83 85 117 108       Glucose 131 on admission   A1C on 02/09/2025 was 5.7  Not currently on any medications for glucose management. Manages diabetes through diet and life style modifications   Continue PTA gabapentin 100 mg BID  SSI with accu-checks   Hypertension  Likely due to missed dialysis   Continue PTA nifedipine 30 mg daily  Continue to monitor  Hypervolemia  Likely 2/2 missed dialysis  Dialysis today  On admission, LE edema and abdominal distension present   CXR 02/18/2025 with small right pleural effusion   Fluid and salt restriction   Monitor I's and O's   Nephrology consulted   Paroxysmal atrial fibrillation (HCC)  EKG on admission sinus bradycardia  Continue home Eliquis 5 mg BID  History of TIA (transient ischemic attack)  Continue PTA aspirin 81 mg BID, atorvastatin 80 mg daily  Hyponatremia  Sodium 131 on admission.  Likely 2/2 volume overload from missed dialysis  Plan for dialysis today  Nephrology input appreciated    Lab Results   Component Value Date/Time    SODIUM 129 (L) 02/20/2025 12:52 PM    SODIUM 130 (L) 02/19/2025 06:32 AM    SODIUM 131 (L) 02/18/2025 09:19 AM      Hyperkalemia  Potassium 6.3 on admission  EKG with no acute changes  Plan for dialysis today  Resolved    Lab Results   Component Value Date/Time    K 4.7 02/20/2025 12:52 PM    K 4.4 02/19/2025 06:32 AM    K 6.3 (HH) 02/18/2025 09:19 AM      Metabolic acidosis  Likely 2/2 missed dialysis   BMP in AM  Gout  Continue home allopuriol 100 mg daily  Influenza A  Day 2/5 Tamiflu  2/19 CXR: Mild right basilar density, likely atelectasis.  Right pleural effusion has improved or resolved.  Patient stable on room air    VTE Pharmacologic Prophylaxis: VTE Score: 4 Moderate Risk (Score 3-4) - Pharmacological DVT Prophylaxis Ordered: heparin.    Mobility:   Basic Mobility Inpatient Raw Score: 24  [facility] Goal: 8:  Walk 250 feet or more  JH-HLM Achieved: 8: Walk 250 feet ot more  JH-HLM Goal achieved. Continue to encourage appropriate mobility.    Patient Centered Rounds: I performed bedside rounds with nursing staff today.   Discussions with Specialists or Other Care Team Provider: Nephrology, case management    Education and Discussions with Family / Patient: Patient declined call to .     Current Length of Stay: 1 day(s)  Current Patient Status: Inpatient   Certification Statement: The patient will continue to require additional inpatient hospital stay due to vomiting  Discharge Plan: Anticipate discharge tomorrow to home.    Code Status: Level 1 - Full Code    Subjective   Patient reports he has continued cough and malaise. He reports he still has chest tightness. He does not want to go home today because he is concerned about slipping on ice outside of his apartment in the dark. Discussed there is nothing further we are doing in the hospital that he cannot do at home.   -Previous admission patient was recommended STR, asked if patient would be interested in this at this time but he adamantly declined.  Asked if he was concerned about ambulating within the residence and he said he was not.  Transport team to ensure he makes it safely indoors.    Objective :  Temp:  [97.4 °F (36.3 °C)-98.1 °F (36.7 °C)] 97.4 °F (36.3 °C)  HR:  [60-73] 73  BP: (145-178)/(65-89) 153/75  Resp:  [17-20] 18  SpO2:  [97 %-98 %] 97 %  O2 Device: None (Room air)    Body mass index is 31 kg/m².     Input and Output Summary (last 24 hours):     Intake/Output Summary (Last 24 hours) at 2/20/2025 2036  Last data filed at 2/20/2025 1615  Gross per 24 hour   Intake 915 ml   Output 2100 ml   Net -1185 ml       Physical Exam  Vitals and nursing note reviewed.   Constitutional:       General: He is not in acute distress.     Appearance: Normal appearance. He is obese.   HENT:      Head: Normocephalic.      Nose: Nose normal.      Mouth/Throat:       Mouth: Mucous membranes are moist.      Pharynx: Oropharynx is clear.   Cardiovascular:      Rate and Rhythm: Normal rate and regular rhythm.      Pulses: Normal pulses.      Heart sounds: Normal heart sounds. No murmur heard.  Pulmonary:      Effort: Pulmonary effort is normal. No respiratory distress.      Breath sounds: Normal breath sounds. No wheezing or rhonchi.   Abdominal:      General: Bowel sounds are decreased. There is no distension.      Palpations: Abdomen is soft.      Tenderness: There is no abdominal tenderness. There is no guarding.   Musculoskeletal:      Right lower leg: Edema present.      Left lower leg: Edema present.   Skin:     General: Skin is warm and dry.      Findings: Erythema present.      Comments: Erythema to bilateral lower extremities   Neurological:      Mental Status: He is alert. Mental status is at baseline.           Lines/Drains:  Lines/Drains/Airways       Active Status       Name Placement date Placement time Site Days    HD Permanent Double Catheter 08/30/23  --  Internal jugular  540                            Lab Results: I have reviewed the following results:   Results from last 7 days   Lab Units 02/20/25  1252   WBC Thousand/uL 6.11   HEMOGLOBIN g/dL 9.2*   HEMATOCRIT % 27.3*   PLATELETS Thousands/uL 190   SEGS PCT % 62   LYMPHO PCT % 18   MONO PCT % 11   EOS PCT % 9*     Results from last 7 days   Lab Units 02/20/25  1252   SODIUM mmol/L 129*   POTASSIUM mmol/L 4.7   CHLORIDE mmol/L 89*   CO2 mmol/L 22   BUN mg/dL 83*   CREATININE mg/dL 10.78*   ANION GAP mmol/L 18*   CALCIUM mg/dL 7.2*   ALBUMIN g/dL 3.9   TOTAL BILIRUBIN mg/dL 0.36   ALK PHOS U/L 88   ALT U/L 22   AST U/L 20   GLUCOSE RANDOM mg/dL 105         Results from last 7 days   Lab Units 02/20/25  1647 02/20/25  1119 02/20/25  0801 02/19/25  2119 02/19/25  1624 02/19/25  1055 02/19/25  0720 02/18/25  2201 02/18/25  1616 02/18/25  1307   POC GLUCOSE mg/dl 108 117 85 83 103 104 85 124 93 113                Recent Cultures (last 7 days):         Imaging Results Review: I reviewed radiology reports from this admission including: chest xray.  Other Study Results Review: No additional pertinent studies reviewed.    Last 24 Hours Medication List:     Current Facility-Administered Medications:     acetaminophen (TYLENOL) tablet 650 mg, Q6H PRN    allopurinol (ZYLOPRIM) tablet 100 mg, Daily    apixaban (ELIQUIS) tablet 5 mg, BID    aspirin chewable tablet 81 mg, Daily    atorvastatin (LIPITOR) tablet 80 mg, Daily    guaiFENesin (MUCINEX) 12 hr tablet 1,200 mg, Q12H KEMAL    influenza vaccine, high-dose (Fluzone High-Dose) IM injection 0.5 mL, Once PRN    insulin lispro (HumALOG/ADMELOG) 100 units/mL subcutaneous injection 1-6 Units, 4x Daily (AC & HS) **AND** Fingerstick Glucose (POCT), 4x Daily AC and at bedtime    NIFEdipine (PROCARDIA XL) 24 hr tablet 30 mg, After Dinner    oseltamivir (TAMIFLU) capsule 30 mg, Q24H    sevelamer (RENAGEL) tablet 800 mg, TID With Meals    trimethobenzamide (TIGAN) IM injection 200 mg, Q6H PRN    Administrative Statements   Today, Patient Was Seen By: GRANT Gutierrez      **Please Note: This note may have been constructed using a voice recognition system.**

## 2025-02-21 NOTE — ASSESSMENT & PLAN NOTE
Positive for FLU A  2/19 CXR: Mild right basilar density, likely atelectasis.  Right pleural effusion has improved or resolved.  Patient stable on room air  Complete Tamiflu course

## 2025-02-21 NOTE — ASSESSMENT & PLAN NOTE
Lab Results   Component Value Date    EGFR 7 02/21/2025    EGFR 4 02/20/2025    EGFR 5 02/19/2025    CREATININE 6.83 (H) 02/21/2025    CREATININE 10.78 (H) 02/20/2025    CREATININE 9.18 (H) 02/19/2025   Hgb 9.7 on admission  Monitor CBC

## 2025-02-21 NOTE — DISCHARGE SUMMARY
Discharge Summary - Hospitalist   Name: Naresh Carpio 64 y.o. male I MRN: [id number]  Unit/Bed#: [address] [place] [address] I Date of Admission: 2/18/2025   Date of Service: 2/21/2025 I Hospital Day: 2     Assessment & Plan  ESRD (end stage renal disease) on dialysis ([facility])  Lab Results   Component Value Date    EGFR 7 02/21/2025    EGFR 4 02/20/2025    EGFR 5 02/19/2025    CREATININE 6.83 (H) 02/21/2025    CREATININE 10.78 (H) 02/20/2025    CREATININE 9.18 (H) 02/19/2025   Patient presented to ED on 2/18/2025 for several days of shortness of breath and chest tightness. Patient reports he has not been to dialysis since he was discharged on 02/11/2025. EKG on admission without ischemic changes. CXR with small right pleural effusion. Labs showing DEVIN. Patient with increased work of breathing, rhonchi, BLE edema, and abdominal distension on exam.   Continue home sevelamer 900 mg TID with meals  Renal diet, fluid restriction  CBC and BMP daily  Nephrology consulted, appreciate recommendations  Continue HD T,Th,S schedule  Acute on chronic diastolic (congestive) heart failure (HCC)  Wt Readings from Last 3 Encounters:   02/18/25 107 kg (235 lb)   02/11/25 108 kg (237 lb)   01/18/25 107 kg (235 lb 9.6 oz)     Received IV lasix 80mg x1 dose in the ED. Further volume control with HD  I's and O's  Daily weights  Anemia due to chronic kidney disease, on chronic dialysis ([facility])  Lab Results   Component Value Date    EGFR 7 02/21/2025    EGFR 4 02/20/2025    EGFR 5 02/19/2025    CREATININE 6.83 (H) 02/21/2025    CREATININE 10.78 (H) 02/20/2025    CREATININE 9.18 (H) 02/19/2025   Hgb 9.7 on admission  Monitor CBC  Type II diabetes mellitus ([facility])  Lab Results   Component Value Date    HGBA1C 5.7 (H) 02/09/2025     Recent Labs     02/20/25  1119 02/20/25  1647 02/20/25  2049 02/21/25  0812   POCGLU 117 108 97 79       Glucose 131 on admission   A1C on 02/09/2025 was 5.7  Not currently on any medications for glucose management. Manages  diabetes through diet and life style modifications   Continue PTA gabapentin 100 mg BID  SSI with accu-checks   Hypertension  Likely due to missed dialysis   Continue PTA nifedipine 30 mg daily  Continue to monitor  Hypervolemia  Likely 2/2 missed dialysis  Dialysis today  On admission, LE edema and abdominal distension present   CXR 02/18/2025 with small right pleural effusion   Fluid and salt restriction   Monitor I's and O's   Nephrology consulted , continue HD as scheduled outpatient  Paroxysmal atrial fibrillation (HCC)  EKG on admission sinus bradycardia  Continue home Eliquis 5 mg BID  History of TIA (transient ischemic attack)  Continue PTA aspirin 81 mg BID, atorvastatin 80 mg daily  Hyponatremia  Sodium 131 on admission.  Continue HD  Nephrology input appreciated    Lab Results   Component Value Date/Time    SODIUM 130 (L) 02/21/2025 05:21 AM    SODIUM 129 (L) 02/20/2025 12:52 PM    SODIUM 130 (L) 02/19/2025 06:32 AM      Gout  Continue home allopuriol 100 mg daily  Influenza A  Positive for FLU A  2/19 CXR: Mild right basilar density, likely atelectasis.  Right pleural effusion has improved or resolved.  Patient stable on room air  Complete Tamiflu course     Medical Problems       Resolved Problems  Date Reviewed: 1/18/2025          Resolved    Hyperkalemia 2/21/2025     Resolved by  Joselyn Reyes Bahamonde, MD    Metabolic acidosis 2/21/2025     Resolved by  Winsome Esquivel PA-C        Discharging Physician / Practitioner: Winsome Esquivel PA-C  PCP: Jeffrey Jackson  Admission Date:   Admission Orders (From admission, onward)       Ordered        02/19/25 1302  INPATIENT ADMISSION  Once            02/18/25 1105  Place in Observation  Once                          Discharge Date: 02/21/25    Consultations During Hospital Stay:  Nephrology    Procedures Performed:   None    Significant Findings / Test Results:   CXR (2/18/25): Stable small right pleural effusion.   CXR (2/19/25): Mild right basilar density, likely  "atelectasis. The right pleural effusion has improved or resolved.     Incidental Findings:   None     Test Results Pending at Discharge (will require follow up):   None     Outpatient Tests Requested:  None    Complications:  None    Reason for Admission: ESRD on HD    Hospital Course:   Naresh Carpio is a 64 y.o. male patient who originally presented to the hospital on 2/18/2025 due to shortness of breath and hypervolemia in the setting of missed dialysis sessions as an outpatient.  Patient with frequent readmissions in the past for noncompliance with dialysis.  Nephrology was consulted for dialysis management.  Patient underwent dialysis with ultrafiltration and now currently euvolemic on exam.  Oxygenating well on room air.  Unfortunately patient tested positive for flu a on 2/19.  Fortunately respiratory status remained stable.  Started on Tamiflu course.  Patient's lab work and vital signs remained stable.  Patient agreeable for discharge home today.  Follow-up with PCP and dialysis clinic as scheduled.  All patient questions answered to the best of my ability.      Please see above list of diagnoses and related plan for additional information.     Condition at Discharge: stable    Discharge Day Visit / Exam:   Subjective:  Patient reports URI symptoms ongoing today. No shortness of breath. Vital signs stable and oxygenating well on RA. Discharge to home today, patient is agreeable.    Vitals: Blood Pressure: 154/69 (02/20/25 2349)  Pulse: 68 (02/20/25 2349)  Temperature: 98.1 °F (36.7 °C) (02/20/25 2349)  Temp Source: Temporal (02/20/25 2349)  Respirations: 20 (02/20/25 2349)  Height: 6' 1\" (185.4 cm) (02/18/25 1254)  Weight - Scale: 107 kg (235 lb) (02/18/25 1254)  SpO2: 98 % (02/20/25 2349)  Physical Exam  Vitals and nursing note reviewed.   Constitutional:       General: He is not in acute distress.     Appearance: He is well-developed.   HENT:      Head: Normocephalic and atraumatic.   Eyes:      " Conjunctiva/sclera: Conjunctivae normal.   Cardiovascular:      Rate and Rhythm: Normal rate and regular rhythm.      Heart sounds: No murmur heard.  Pulmonary:      Effort: Pulmonary effort is normal. No respiratory distress.   Musculoskeletal:         General: No swelling.      Cervical back: Neck supple.   Skin:     General: Skin is warm and dry.   Neurological:      Mental Status: He is alert.   Psychiatric:         Mood and Affect: Mood normal.          Discussion with Family: Patient declined call to .     Discharge instructions/Information to patient and family:   See after visit summary for information provided to patient and family.      Provisions for Follow-Up Care:  See after visit summary for information related to follow-up care and any pertinent home health orders.      Mobility at time of Discharge:   Basic Mobility Inpatient Raw Score: 24  JH-HLM Goal: 8: Walk 250 feet or more  JH-HLM Achieved: 8: Walk 250 feet ot more  HLM Goal achieved. Continue to encourage appropriate mobility.     Disposition:   Home    Planned Readmission: None    Discharge Medications:  See after visit summary for reconciled discharge medications provided to patient and/or family.      Administrative Statements   Discharge Statement:  I have spent a total time of 50 minutes in caring for this patient on the day of the visit/encounter. >30 minutes of time was spent on: Diagnostic results, Risks and benefits of tx options, Instructions for management, Counseling / Coordination of care, Documenting in the medical record, Reviewing / ordering tests, medicine, procedures  , and Communicating with other healthcare professionals .    **Please Note: This note may have been constructed using a voice recognition system**

## 2025-02-21 NOTE — PHYSICAL THERAPY NOTE
"   PT EVALUATION     02/21/25 7545   PT Last Visit   PT Visit Date 02/21/25   Note Type   Note type Evaluation   Pain Assessment   Pain Assessment Tool Farr-Baker FACES   Farr-Baker FACES Pain Rating 2  (Lshoulder area and generalized \"not feeling well with the flu\")   Restrictions/Precautions   LUE Weight Bearing Per Order NWB   Braces or Orthoses Sling  (although patient without sling here in the hospital and patient states that he stopped wearing it at home as per his choice)   Home Living   Type of Home Apartment   Home Layout One level;Stairs to enter with rails  (3 stairs to enter at 1 entrance and 8 to enter at the other)   Home Equipment Walker   Additional Comments Patient reports using walker at times at home, as needed   Prior Function   Level of [place] Independent with ADLs;Independent with functional mobility;Independent with IADLS   Lives With Alone   IADLs Independent with driving;Independent with meal prep;Independent with medication management   Falls in the last 6 months 1 to 4   Comments Most recent fall with fracture of the left greater tuberosity of the shoulder area   General   Additional Pertinent History Chart reviewed, patient admitted with end-stage renal disease.  Patient also flu positive with generalized fatigue and generally ill feeling.  Patient now demonstrating independent bed mobility, transfers gait and stair climbing for short distances   Family/Caregiver Present No   Cognition   Overall Cognitive Status WFL   Arousal/Participation Cooperative   Orientation Level Oriented X4   Following Commands Follows all commands and directions without difficulty   Subjective   Subjective Patient states not feeling well due to the flu   RLE Assessment   RLE Assessment   (Range of motion within functional limits, strength 4 -/5)   LLE Assessment   LLE Assessment   (Range of motion within functional limits, strength 4 -/5)   Coordination   Movements are Fluid and Coordinated 1   Bed " Mobility   Supine to Sit 7  Independent   Sit to Supine 7  Independent   Transfers   Sit to Stand 7  Independent   Stand to Sit 7  Independent   Ambulation/Elevation   Gait Assistance 7  Independent   Assistive Device   (none)   Distance 15 feet without balance loss although reaching for furniture, educated patient in importance of NWB as ordered on previous admission due to shoulder fracture   Stair Management Assistance 7  Independent   Stair Management Technique Step to pattern   Number of Stairs 6  (Patient able to manage stair climbing well with no balance loss.  Slow gait patterning although steady)   Balance   Static Sitting Good   Dynamic Sitting Fair +   Static Standing Fair +   Dynamic Standing Fair   Ambulatory Fair   Activity Tolerance   Activity Tolerance Patient limited by fatigue   Medical Staff Made Aware Yes   Assessment   Prognosis Good   Problem List Orthopedic restrictions;Decreased strength;Decreased endurance;Decreased mobility;Pain   Assessment Patient seen for Physical Therapy evaluation. Patient admitted with ESRD (end stage renal disease) on dialysis ([facility]).  Comorbidities affecting patient's physical performance include: .  Patient presents as independent with gait activity on level and unlevel surfaces, no skilled PT needs.  Personal factors affecting patient at time of initial evaluation include: ambulating with assistive device, inability to perform dynamic tasks in community, and limited home support. Prior to admission, patient was independent with functional mobility with walker at times, independent with ADLS, and independent with IADLS.  Please find objective findings from Physical Therapy assessment regarding body systems outlined above with impairments and limitations including no new functional deficits.  The Barthel Index was used as a functional outcome tool presenting with a score of Barthel Index Score: 100 today indicating no limitations of functional mobility and ADLS.   Patient's clinical presentation is currently stable  as seen in patient's presentation of no new functional deficits.  As demonstrated by objective findings, the assigned level of complexity for this evaluation is low.The patient's AM-PAC Basic Mobility Inpatient Short Form Raw Score is 24. A Raw score of greater than 16 suggests the patient may benefit from discharge to home. Please also refer to the recommendation of the Physical Therapist for safe discharge planning.   Goals   Patient Goals To feel better   STG Expiration Date   (N/A)   LTG Expiration Date   (N/A)   Discharge Recommendation   Rehab Resource Intensity Level, PT No post-acute rehabilitation needs   AM-PAC Basic Mobility Inpatient   Turning in Flat Bed Without Bedrails 4   Lying on Back to Sitting on Edge of Flat Bed Without Bedrails 4   Moving Bed to Chair 4   Standing Up From Chair Using Arms 4   Walk in Room 4   Climb 3-5 Stairs With Railing 4   Basic Mobility Inpatient Raw Score 24   Basic Mobility Standardized Score 57.68   [facility] Highest Level Of Mobility   -HLM Goal 8: Walk 250 feet or more   JH-HLM Achieved 7: Walk 25 feet or more  (Patient stating fatigue and generally not feeling well due to flu)   Barthel Index   Feeding 10   Bathing 5   Grooming Score 5   Dressing Score 10   Bladder Score 10   Bowels Score 10   Toilet Use Score 10   Transfers (Bed/Chair) Score 15   Mobility (Level Surface) Score 15   Stairs Score 10   Barthel Index Score 100   Licensure   [place] License Number  Anat Lal PT [id number]

## 2025-02-21 NOTE — ASSESSMENT & PLAN NOTE
Sodium 131 on admission.  Continue HD  Nephrology input appreciated    Lab Results   Component Value Date/Time    SODIUM 130 (L) 02/21/2025 05:21 AM    SODIUM 129 (L) 02/20/2025 12:52 PM    SODIUM 130 (L) 02/19/2025 06:32 AM

## 2025-02-21 NOTE — ASSESSMENT & PLAN NOTE
Lab Results   Component Value Date    EGFR 7 02/21/2025    EGFR 4 02/20/2025    EGFR 5 02/19/2025    CREATININE 6.83 (H) 02/21/2025    CREATININE 10.78 (H) 02/20/2025    CREATININE 9.18 (H) 02/19/2025   Patient presented to ED on 2/18/2025 for several days of shortness of breath and chest tightness. Patient reports he has not been to dialysis since he was discharged on 02/11/2025. EKG on admission without ischemic changes. CXR with small right pleural effusion. Labs showing DEVIN. Patient with increased work of breathing, rhonchi, BLE edema, and abdominal distension on exam.   Continue home sevelamer 900 mg TID with meals  Renal diet, fluid restriction  CBC and BMP daily  Nephrology consulted, appreciate recommendations  Continue HD T,Th,S schedule

## 2025-02-21 NOTE — ASSESSMENT & PLAN NOTE
Likely 2/2 missed dialysis  Dialysis today  On admission, LE edema and abdominal distension present   CXR 02/18/2025 with small right pleural effusion   Fluid and salt restriction   Monitor I's and O's   Nephrology consulted , continue HD as scheduled outpatient

## 2025-02-21 NOTE — ASSESSMENT & PLAN NOTE
Serum sodium 130 mmol/L  Secondary to decreased free water excretion in the settings of ESRD   Ultrafiltration on dialysis

## 2025-02-24 ENCOUNTER — APPOINTMENT (EMERGENCY)
Dept: RADIOLOGY | Facility: HOSPITAL | Age: 65
End: 2025-02-24
Payer: MEDICARE

## 2025-02-24 ENCOUNTER — HOSPITAL ENCOUNTER (INPATIENT)
Facility: HOSPITAL | Age: 65
LOS: 2 days | Discharge: HOME | End: 2025-02-27
Admitting: INTERNAL MEDICINE
Payer: MEDICARE

## 2025-02-24 DIAGNOSIS — J10.1 INFLUENZA A: ICD-10-CM

## 2025-02-24 DIAGNOSIS — R26.2 AMBULATORY DYSFUNCTION: ICD-10-CM

## 2025-02-24 DIAGNOSIS — N18.6 ESRD ON DIALYSIS (HCC): ICD-10-CM

## 2025-02-24 DIAGNOSIS — Z99.2 ESRD ON DIALYSIS (HCC): ICD-10-CM

## 2025-02-24 DIAGNOSIS — R53.1 GENERALIZED WEAKNESS: Primary | ICD-10-CM

## 2025-02-24 PROBLEM — I50.32 CHRONIC DIASTOLIC (CONGESTIVE) HEART FAILURE (HCC): Status: ACTIVE | Noted: 2024-11-19

## 2025-02-24 LAB
2HR DELTA HS TROPONIN: 7 NG/L
4HR DELTA HS TROPONIN: 13 NG/L
ALBUMIN SERPL BCG-MCNC: 3.8 G/DL (ref 3.5–5)
ALP SERPL-CCNC: 79 U/L (ref 34–104)
ALT SERPL W P-5'-P-CCNC: 12 U/L (ref 7–52)
ANION GAP SERPL CALCULATED.3IONS-SCNC: 16 MMOL/L (ref 4–13)
AST SERPL W P-5'-P-CCNC: 10 U/L (ref 13–39)
BASOPHILS # BLD AUTO: 0.02 THOUSANDS/ÂΜL (ref 0–0.1)
BASOPHILS NFR BLD AUTO: 0 % (ref 0–1)
BILIRUB SERPL-MCNC: 0.3 MG/DL (ref 0.2–1)
BNP SERPL-MCNC: 1000 PG/ML (ref 0–100)
BUN SERPL-MCNC: 83 MG/DL (ref 5–25)
CALCIUM SERPL-MCNC: 7.8 MG/DL (ref 8.4–10.2)
CARDIAC TROPONIN I PNL SERPL HS: 33 NG/L (ref ?–50)
CARDIAC TROPONIN I PNL SERPL HS: 40 NG/L (ref ?–50)
CARDIAC TROPONIN I PNL SERPL HS: 46 NG/L (ref ?–50)
CHLORIDE SERPL-SCNC: 92 MMOL/L (ref 96–108)
CO2 SERPL-SCNC: 21 MMOL/L (ref 21–32)
CREAT SERPL-MCNC: 10.76 MG/DL (ref 0.6–1.3)
EOSINOPHIL # BLD AUTO: 0.24 THOUSAND/ÂΜL (ref 0–0.61)
EOSINOPHIL NFR BLD AUTO: 3 % (ref 0–6)
ERYTHROCYTE [DISTWIDTH] IN BLOOD BY AUTOMATED COUNT: 15 % (ref 11.6–15.1)
GFR SERPL CREATININE-BSD FRML MDRD: 4 ML/MIN/1.73SQ M
GLUCOSE SERPL-MCNC: 122 MG/DL (ref 65–140)
GLUCOSE SERPL-MCNC: 92 MG/DL (ref 65–140)
HCT VFR BLD AUTO: 27 % (ref 36.5–49.3)
HGB BLD-MCNC: 8.6 G/DL (ref 12–17)
IMM GRANULOCYTES # BLD AUTO: 0.07 THOUSAND/UL (ref 0–0.2)
IMM GRANULOCYTES NFR BLD AUTO: 1 % (ref 0–2)
LYMPHOCYTES # BLD AUTO: 0.83 THOUSANDS/ÂΜL (ref 0.6–4.47)
LYMPHOCYTES NFR BLD AUTO: 9 % (ref 14–44)
MCH RBC QN AUTO: 28.5 PG (ref 26.8–34.3)
MCHC RBC AUTO-ENTMCNC: 31.9 G/DL (ref 31.4–37.4)
MCV RBC AUTO: 89 FL (ref 82–98)
MONOCYTES # BLD AUTO: 0.88 THOUSAND/ÂΜL (ref 0.17–1.22)
MONOCYTES NFR BLD AUTO: 10 % (ref 4–12)
NEUTROPHILS # BLD AUTO: 7.11 THOUSANDS/ÂΜL (ref 1.85–7.62)
NEUTS SEG NFR BLD AUTO: 77 % (ref 43–75)
NRBC BLD AUTO-RTO: 0 /100 WBCS
PLATELET # BLD AUTO: 211 THOUSANDS/UL (ref 149–390)
PMV BLD AUTO: 9.2 FL (ref 8.9–12.7)
POTASSIUM SERPL-SCNC: 4.7 MMOL/L (ref 3.5–5.3)
PROT SERPL-MCNC: 7.4 G/DL (ref 6.4–8.4)
RBC # BLD AUTO: 3.02 MILLION/UL (ref 3.88–5.62)
SODIUM SERPL-SCNC: 129 MMOL/L (ref 135–147)
WBC # BLD AUTO: 9.15 THOUSAND/UL (ref 4.31–10.16)

## 2025-02-24 PROCEDURE — 85025 COMPLETE CBC W/AUTO DIFF WBC: CPT

## 2025-02-24 PROCEDURE — 80053 COMPREHEN METABOLIC PANEL: CPT

## 2025-02-24 PROCEDURE — 71045 X-RAY EXAM CHEST 1 VIEW: CPT

## 2025-02-24 PROCEDURE — 84484 ASSAY OF TROPONIN QUANT: CPT

## 2025-02-24 PROCEDURE — 82948 REAGENT STRIP/BLOOD GLUCOSE: CPT

## 2025-02-24 PROCEDURE — 99223 1ST HOSP IP/OBS HIGH 75: CPT | Performed by: PHYSICIAN ASSISTANT

## 2025-02-24 PROCEDURE — 99285 EMERGENCY DEPT VISIT HI MDM: CPT

## 2025-02-24 PROCEDURE — 93005 ELECTROCARDIOGRAM TRACING: CPT

## 2025-02-24 PROCEDURE — 83880 ASSAY OF NATRIURETIC PEPTIDE: CPT

## 2025-02-24 PROCEDURE — 36415 COLL VENOUS BLD VENIPUNCTURE: CPT

## 2025-02-24 RX ORDER — INSULIN LISPRO 100 [IU]/ML
1-6 INJECTION, SOLUTION INTRAVENOUS; SUBCUTANEOUS
Status: DISCONTINUED | OUTPATIENT
Start: 2025-02-25 | End: 2025-02-27 | Stop reason: HOSPADM

## 2025-02-24 RX ORDER — ALLOPURINOL 100 MG/1
100 TABLET ORAL DAILY
Status: DISCONTINUED | OUTPATIENT
Start: 2025-02-25 | End: 2025-02-24

## 2025-02-24 RX ORDER — SEVELAMER HYDROCHLORIDE 800 MG/1
800 TABLET, FILM COATED ORAL
Status: DISCONTINUED | OUTPATIENT
Start: 2025-02-25 | End: 2025-02-27 | Stop reason: HOSPADM

## 2025-02-24 RX ORDER — ATORVASTATIN CALCIUM 40 MG/1
80 TABLET, FILM COATED ORAL
Status: DISCONTINUED | OUTPATIENT
Start: 2025-02-25 | End: 2025-02-27 | Stop reason: HOSPADM

## 2025-02-24 RX ORDER — GABAPENTIN 100 MG/1
200 CAPSULE ORAL 2 TIMES DAILY
Status: DISCONTINUED | OUTPATIENT
Start: 2025-02-24 | End: 2025-02-24

## 2025-02-24 RX ORDER — ACETAMINOPHEN 325 MG/1
650 TABLET ORAL EVERY 6 HOURS PRN
Status: DISCONTINUED | OUTPATIENT
Start: 2025-02-24 | End: 2025-02-27 | Stop reason: HOSPADM

## 2025-02-24 RX ORDER — ASPIRIN 81 MG/1
81 TABLET, CHEWABLE ORAL DAILY
Status: DISCONTINUED | OUTPATIENT
Start: 2025-02-25 | End: 2025-02-27 | Stop reason: HOSPADM

## 2025-02-24 RX ORDER — INSULIN LISPRO 100 [IU]/ML
1-6 INJECTION, SOLUTION INTRAVENOUS; SUBCUTANEOUS
Status: DISCONTINUED | OUTPATIENT
Start: 2025-02-24 | End: 2025-02-27 | Stop reason: HOSPADM

## 2025-02-24 RX ORDER — GABAPENTIN 100 MG/1
100 CAPSULE ORAL 2 TIMES DAILY
Status: DISCONTINUED | OUTPATIENT
Start: 2025-02-25 | End: 2025-02-27 | Stop reason: HOSPADM

## 2025-02-24 RX ORDER — BENZONATATE 100 MG/1
100 CAPSULE ORAL 3 TIMES DAILY
Status: DISCONTINUED | OUTPATIENT
Start: 2025-02-24 | End: 2025-02-27 | Stop reason: HOSPADM

## 2025-02-24 RX ORDER — NIFEDIPINE 30 MG/1
30 TABLET, EXTENDED RELEASE ORAL
Status: DISCONTINUED | OUTPATIENT
Start: 2025-02-24 | End: 2025-02-27 | Stop reason: HOSPADM

## 2025-02-24 RX ORDER — ALLOPURINOL 100 MG/1
50 TABLET ORAL DAILY
Status: DISCONTINUED | OUTPATIENT
Start: 2025-02-25 | End: 2025-02-27 | Stop reason: HOSPADM

## 2025-02-24 RX ORDER — GUAIFENESIN 600 MG/1
600 TABLET, EXTENDED RELEASE ORAL EVERY 12 HOURS SCHEDULED
Status: DISCONTINUED | OUTPATIENT
Start: 2025-02-24 | End: 2025-02-27 | Stop reason: HOSPADM

## 2025-02-24 RX ORDER — GUAIFENESIN AND DEXTROMETHORPHAN HYDROBROMIDE 10; 100 MG/5ML; MG/5ML
10 SYRUP ORAL EVERY 4 HOURS PRN
Status: DISCONTINUED | OUTPATIENT
Start: 2025-02-24 | End: 2025-02-27 | Stop reason: HOSPADM

## 2025-02-24 RX ADMIN — NIFEDIPINE 30 MG: 30 TABLET, EXTENDED RELEASE ORAL at 22:37

## 2025-02-24 RX ADMIN — BENZONATATE 100 MG: 100 CAPSULE ORAL at 22:37

## 2025-02-24 RX ADMIN — GUAIFENESIN AND DEXTROMETHORPHAN 10 ML: 20; 200 SYRUP ORAL at 22:36

## 2025-02-24 RX ADMIN — GUAIFENESIN 600 MG: 600 TABLET, EXTENDED RELEASE ORAL at 22:37

## 2025-02-24 NOTE — ED PROVIDER NOTES
Time reflects when diagnosis was documented in both MDM as applicable and the Disposition within this note       Time User Action Codes Description Comment    2/24/2025  8:22 PM Errol Nichols [R53.1] Generalized weakness     2/24/2025  8:22 PM Errol Nichols [R26.2] Ambulatory dysfunction     2/24/2025  8:22 PM Errol Nichols [N18.6,  Z99.2] ESRD on dialysis (HCC)           ED Disposition       ED Disposition   Admit    Condition   Stable    Date/Time   Mon Feb 24, 2025  8:22 PM    Comment   Case was discussed with JANIA and the patient's admission status was agreed to be Admission Status: observation status to the service of Dr. Bruce.               Assessment & Plan       Medical Decision Making  Amount and/or Complexity of Data Reviewed  Labs: ordered. Decision-making details documented in ED Course.  Radiology: ordered and independent interpretation performed.  ECG/medicine tests:  Decision-making details documented in ED Course.    Risk  Decision regarding hospitalization.      65 y/o M presents via EMS with generalized weakness. PMH ESRD on HD TRS. No HD since last week, pt not sure which day he went. Reports feeling weak and had a controlled fall earlier, denies HS or LOC. Has stable cough, no sig cp or sob but feels chest congestion. Reports recently having the flu and this is why he didn't go to HD last week.   VSS  4/5 strength b/l lower extremities. Does not appear significantly volume overloaded. Normal O2 sat in no respiratory distress.  Plan: labs eval electrolyte abnormality, anemia, CXR for pulm edema  Labs baseline, CXR baseline  Ultimately will admit patient for ambulatory dysfunction, fall risk, pt does not feel stable on feet with walker.     ED Course as of 02/24/25 2229   Mon Feb 24, 2025   1653 ECG 12 lead  Procedure Note: EKG  Date/Time: 02/24/25 4:53 PM   Interpreted by: Errol Nichols MD  Indications / Diagnosis: sob, missed HD  ECG reviewed by me, the ED Provider: yes    The EKG demonstrates:  Rhythm: normal sinus  Intervals: normal intervals  Axis: normal axis  QRS/Blocks: normal QRS  ST Changes: No acute ST Changes, no STD/NAZARIO.     1744 Hemoglobin(!): 8.6  Chronic 2/2 ESRD   1818 BUN(!): 83   1818 ANION GAP(!): 16  2/2 uremia       Medications   allopurinol (ZYLOPRIM) tablet 100 mg (has no administration in time range)   apixaban (ELIQUIS) tablet 5 mg (has no administration in time range)   aspirin chewable tablet 81 mg (has no administration in time range)   atorvastatin (LIPITOR) tablet 80 mg (has no administration in time range)   gabapentin (NEURONTIN) capsule 200 mg (has no administration in time range)   NIFEdipine (PROCARDIA XL) 24 hr tablet 30 mg (has no administration in time range)   sevelamer (RENAGEL) tablet 800 mg (has no administration in time range)   acetaminophen (TYLENOL) tablet 650 mg (has no administration in time range)   guaiFENesin (MUCINEX) 12 hr tablet 600 mg (has no administration in time range)   benzonatate (TESSALON PERLES) capsule 100 mg (has no administration in time range)   insulin lispro (HumALOG/ADMELOG) 100 units/mL subcutaneous injection 1-6 Units (has no administration in time range)   insulin lispro (HumALOG/ADMELOG) 100 units/mL subcutaneous injection 1-6 Units (has no administration in time range)   dextromethorphan-guaiFENesin (ROBITUSSIN DM) oral syrup 10 mL (has no administration in time range)       ED Risk Strat Scores                                                History of Present Illness       Chief Complaint   Patient presents with    Shortness of Breath     Pt reports sob X 1 day after missing dialysis since last week       Past Medical History:   Diagnosis Date    Acute cystitis 10/18/2024    Acute on chronic anemia 01/23/2022    Atrial fibrillation (HCC)     Bradycardia 09/05/2023    Diabetes mellitus (HCC)     ESRD (end stage renal disease) on dialysis (HCC)     Hematuria 10/10/2024    Hematuria 10/10/2024    Hyperkalemia  04/06/2024    Hyperkalemia 04/06/2024    Hyperlipidemia     Hypertension     Left toe amputee (HCC)     TIA (transient ischemic attack)     Toxic metabolic encephalopathy 10/08/2024      Past Surgical History:   Procedure Laterality Date    AMPUTATION Left     left foot resection 10 years ago dr tran    HEMODIALYSIS ADULT  1/18/2025    IR LOWER EXTREMITY ANGIOGRAM  08/29/2023    IR TEMPORARY DIALYSIS CATHETER PLACEMENT  08/25/2023    IR TUNNELED CENTRAL LINE REMOVAL  08/23/2023    IR TUNNELED DIALYSIS CATHETER PLACEMENT  01/27/2022    IR TUNNELED DIALYSIS CATHETER PLACEMENT  08/30/2023    SC AMPUTATION METATARSAL W/TOE SINGLE Right 8/31/2023    Procedure: RIGHT PARTIAL 1ST RAY RESECTION WITH  WOUND VAC APPLICATION;  Surgeon: Won Parmar DPM;  Location: [facility];  Service: Podiatry      History reviewed. No pertinent family history.   Social History     Tobacco Use    Smoking status: Never     Passive exposure: Never    Smokeless tobacco: Never   Vaping Use    Vaping status: Never Used   Substance Use Topics    Alcohol use: Not Currently     Alcohol/week: 0.0 standard drinks of alcohol     Comment: 0    Drug use: Never      E-Cigarette/Vaping    E-Cigarette Use Never User       E-Cigarette/Vaping Substances    Nicotine No     THC No     CBD No     Flavoring No     Other No     Unknown No       I have reviewed and agree with the history as documented.     HPI  See mdm  Review of Systems   Constitutional:  Negative for chills and fever.   HENT:  Negative for ear pain and sore throat.    Eyes:  Negative for pain and visual disturbance.   Respiratory:  Negative for cough and shortness of breath.    Cardiovascular:  Negative for chest pain and palpitations.   Gastrointestinal:  Negative for abdominal pain and vomiting.   Genitourinary:  Negative for dysuria and hematuria.   Musculoskeletal:  Negative for arthralgias and back pain.   Skin:  Negative for color change and rash.   Neurological:  Positive for weakness.  Negative for seizures and syncope.   All other systems reviewed and are negative.          Objective       ED Triage Vitals [02/24/25 1642]   Temperature Pulse Blood Pressure Respirations SpO2 Patient Position - Orthostatic VS   98 °F (36.7 °C) 78 (!) 191/80 20 99 % Sitting      Temp Source Heart Rate Source BP Location FiO2 (%) Pain Score    Oral Monitor Left arm -- --      Vitals      Date and Time Temp Pulse SpO2 Resp BP Pain Score FACES Pain Rating User   02/24/25 2154 97.5 °F (36.4 °C) 79 100 % 20 188/85 -- -- FP   02/24/25 2045 -- 77 99 % 16 188/83 -- -- SS   02/24/25 1642 98 °F (36.7 °C) 78 99 % 20 191/80 -- -- BG            Physical Exam  Vitals and nursing note reviewed.   Constitutional:       General: He is not in acute distress.     Appearance: He is well-developed. He is not toxic-appearing.   HENT:      Head: Normocephalic and atraumatic.      Right Ear: External ear normal.      Left Ear: External ear normal.      Nose: Nose normal.      Mouth/Throat:      Pharynx: Oropharynx is clear. No oropharyngeal exudate or posterior oropharyngeal erythema.   Eyes:      Extraocular Movements: Extraocular movements intact.      Conjunctiva/sclera: Conjunctivae normal.      Pupils: Pupils are equal, round, and reactive to light.   Cardiovascular:      Rate and Rhythm: Normal rate and regular rhythm.      Pulses: Normal pulses.      Heart sounds: Normal heart sounds. No murmur heard.     No friction rub. No gallop.   Pulmonary:      Effort: Pulmonary effort is normal. No respiratory distress.      Breath sounds: Normal breath sounds. No wheezing, rhonchi or rales.   Abdominal:      General: Abdomen is flat.      Palpations: Abdomen is soft.      Tenderness: There is no abdominal tenderness. There is no guarding or rebound.   Musculoskeletal:         General: Normal range of motion.      Cervical back: Normal range of motion. No rigidity.      Right lower leg: No tenderness. Edema present.      Left lower leg: No  tenderness. Edema present.   Skin:     General: Skin is warm and dry.      Capillary Refill: Capillary refill takes less than 2 seconds.   Neurological:      General: No focal deficit present.      Mental Status: He is alert.      Comments: B/l lower extremity strength 4/5 bilaterally    Psychiatric:         Mood and Affect: Mood normal.         Results Reviewed       Procedure Component Value Units Date/Time    HS Troponin I 4hr [[id number]]  (Normal) Collected: 02/24/25 2139    Lab Status: Final result Specimen: Blood from Arm, Right Updated: 02/24/25 2210     hs TnI 4hr 46 ng/L      Delta 4hr hsTnI 13 ng/L     HS Troponin I 2hr [[id number]]  (Normal) Collected: 02/24/25 1953    Lab Status: Final result Specimen: Blood from Arm, Right Updated: 02/24/25 2102     hs TnI 2hr 40 ng/L      Delta 2hr hsTnI 7 ng/L     B-Type Natriuretic Peptide(BNP) [[id number]]  (Abnormal) Collected: 02/24/25 1739    Lab Status: Final result Specimen: Blood from Arm, Right Updated: 02/24/25 1827     BNP 1,000 pg/mL     HS Troponin 0hr (reflex protocol) [[id number]]  (Normal) Collected: 02/24/25 1739    Lab Status: Final result Specimen: Blood from Arm, Right Updated: 02/24/25 1821     hs TnI 0hr 33 ng/L     Comprehensive metabolic panel [[id number]]  (Abnormal) Collected: 02/24/25 1739    Lab Status: Final result Specimen: Blood from Arm, Right Updated: 02/24/25 1813     Sodium 129 mmol/L      Potassium 4.7 mmol/L      Chloride 92 mmol/L      CO2 21 mmol/L      ANION GAP 16 mmol/L      BUN 83 mg/dL      Creatinine 10.76 mg/dL      Glucose 122 mg/dL      Calcium 7.8 mg/dL      AST 10 U/L      ALT 12 U/L      Alkaline Phosphatase 79 U/L      Total Protein 7.4 g/dL      Albumin 3.8 g/dL      Total Bilirubin 0.30 mg/dL      eGFR 4 ml/min/1.73sq m     Narrative:      National Kid[employer] Disease Foundation guidelines for Chronic Kidney Disease (CKD):     Stage 1 with normal or high GFR (GFR > 90 mL/min/1.73 square meters)    Stage 2 Mild CKD (GFR =  60-89 mL/min/1.73 square meters)    Stage 3A Moderate CKD (GFR = 45-59 mL/min/1.73 square meters)    Stage 3B Moderate CKD (GFR = 30-44 mL/min/1.73 square meters)    Stage 4 Severe CKD (GFR = 15-29 mL/min/1.73 square meters)    Stage 5 End Stage CKD (GFR <15 mL/min/1.73 square meters)  Note: GFR calculation is accurate only with a steady state creatinine    CBC and differential [[id number]]  (Abnormal) Collected: 02/24/25 1739    Lab Status: Final result Specimen: Blood from Arm, Right Updated: 02/24/25 1744     WBC 9.15 Thousand/uL      RBC 3.02 Million/uL      Hemoglobin 8.6 g/dL      Hematocrit 27.0 %      MCV 89 fL      MCH 28.5 pg      MCHC 31.9 g/dL      RDW 15.0 %      MPV 9.2 fL      Platelets 211 Thousands/uL      nRBC 0 /100 WBCs      Segmented % 77 %      Immature Grans % 1 %      Lymphocytes % 9 %      Monocytes % 10 %      Eosinophils Relative 3 %      Basophils Relative 0 %      Absolute Neutrophils 7.11 Thousands/µL      Absolute Immature Grans 0.07 Thousand/uL      Absolute Lymphocytes 0.83 Thousands/µL      Absolute Monocytes 0.88 Thousand/µL      Eosinophils Absolute 0.24 Thousand/µL      Basophils Absolute 0.02 Thousands/µL             XR chest 1 view   ED Interpretation by Errol Nichols MD (02/24 1736)   Stable R pleural effusion, no acute cardiopulmonary findings per my interpretation       Final Interpretation by Theodore Stacy MD (02/24 1759)      Discoid right basilar atelectasis with a small right pleural effusion.            Workstation performed: [id number]             Procedures    ED Medication and Procedure Management   Prior to Admission Medications   Prescriptions Last Dose Informant Patient Reported? Taking?   NIFEdipine (PROCARDIA XL) 30 mg 24 hr tablet   No No   Sig: Take 1 tablet (30 mg total) by mouth daily after dinner   allopurinol (ZYLOPRIM) 100 mg tablet  Self No No   Sig: Take 1 tablet (100 mg total) by mouth daily   apixaban (Eliquis) 5 mg   No No   Sig: Take  1 tablet (5 mg total) by mouth 2 (two) times a day   aspirin 81 mg chewable tablet   No No   Sig: Chew 1 tablet (81 mg total) daily   atorvastatin (LIPITOR) 80 mg tablet   Yes No   Sig: Take 80 mg by mouth daily   gabapentin (NEURONTIN) 100 mg capsule   No No   Sig: Take 2 capsules (200 mg total) by mouth 2 (two) times a day   sevelamer (RENAGEL) 800 mg tablet  Self No No   Sig: Take 1 tablet (800 mg total) by mouth 3 (three) times a day with meals      Facility-Administered Medications: None     Current Discharge Medication List        CONTINUE these medications which have NOT CHANGED    Details   allopurinol (ZYLOPRIM) 100 mg tablet Take 1 tablet (100 mg total) by mouth daily  Qty: 30 tablet, Refills: 0    Associated Diagnoses: Gout      apixaban (Eliquis) 5 mg Take 1 tablet (5 mg total) by mouth 2 (two) times a day  Qty: 60 tablet, Refills: 0    Associated Diagnoses: Paroxysmal atrial fibrillation (HCC)      aspirin 81 mg chewable tablet Chew 1 tablet (81 mg total) daily  Qty: 30 tablet, Refills: 0    Associated Diagnoses: Stroke-like symptoms      atorvastatin (LIPITOR) 80 mg tablet Take 80 mg by mouth daily      gabapentin (NEURONTIN) 100 mg capsule Take 2 capsules (200 mg total) by mouth 2 (two) times a day  Qty: 120 capsule, Refills: 0    Associated Diagnoses: Diabetic polyneuropathy associated with type 2 diabetes mellitus (HCC)      NIFEdipine (PROCARDIA XL) 30 mg 24 hr tablet Take 1 tablet (30 mg total) by mouth daily after dinner    Associated Diagnoses: Hypertensive urgency      sevelamer (RENAGEL) 800 mg tablet Take 1 tablet (800 mg total) by mouth 3 (three) times a day with meals  Refills: 0    Associated Diagnoses: ESRD (end stage renal disease) ([facility])           No discharge procedures on file.  ED SEPSIS DOCUMENTATION   Time reflects when diagnosis was documented in both MDM as applicable and the Disposition within this note       Time User Action Codes Description Comment    2/24/2025  8:22 PM  Simone, Errol Add [R53.1] Generalized weakness     2/24/2025  8:22 PM Errol Nichols [R26.2] Ambulatory dysfunction     2/24/2025  8:22 PM Errol Nichols [N18.6,  Z99.2] ESRD on dialysis ([facility])                  Errol Nichols MD  02/24/25 7720

## 2025-02-25 ENCOUNTER — APPOINTMENT (OUTPATIENT)
Dept: DIALYSIS | Facility: HOSPITAL | Age: 65
End: 2025-02-25
Payer: MEDICARE

## 2025-02-25 LAB
ANION GAP SERPL CALCULATED.3IONS-SCNC: 19 MMOL/L (ref 4–13)
BASOPHILS # BLD AUTO: 0.02 THOUSANDS/ÂΜL (ref 0–0.1)
BASOPHILS NFR BLD AUTO: 0 % (ref 0–1)
BUN SERPL-MCNC: 86 MG/DL (ref 5–25)
CALCIUM SERPL-MCNC: 8 MG/DL (ref 8.4–10.2)
CHLORIDE SERPL-SCNC: 92 MMOL/L (ref 96–108)
CO2 SERPL-SCNC: 19 MMOL/L (ref 21–32)
CREAT SERPL-MCNC: 11.06 MG/DL (ref 0.6–1.3)
EOSINOPHIL # BLD AUTO: 0.49 THOUSAND/ÂΜL (ref 0–0.61)
EOSINOPHIL NFR BLD AUTO: 6 % (ref 0–6)
ERYTHROCYTE [DISTWIDTH] IN BLOOD BY AUTOMATED COUNT: 15 % (ref 11.6–15.1)
GFR SERPL CREATININE-BSD FRML MDRD: 4 ML/MIN/1.73SQ M
GLUCOSE SERPL-MCNC: 104 MG/DL (ref 65–140)
GLUCOSE SERPL-MCNC: 108 MG/DL (ref 65–140)
GLUCOSE SERPL-MCNC: 108 MG/DL (ref 65–140)
GLUCOSE SERPL-MCNC: 162 MG/DL (ref 65–140)
GLUCOSE SERPL-MCNC: 86 MG/DL (ref 65–140)
HCT VFR BLD AUTO: 26.5 % (ref 36.5–49.3)
HGB BLD-MCNC: 8.6 G/DL (ref 12–17)
IMM GRANULOCYTES # BLD AUTO: 0.03 THOUSAND/UL (ref 0–0.2)
IMM GRANULOCYTES NFR BLD AUTO: 0 % (ref 0–2)
LYMPHOCYTES # BLD AUTO: 1.57 THOUSANDS/ÂΜL (ref 0.6–4.47)
LYMPHOCYTES NFR BLD AUTO: 19 % (ref 14–44)
MAGNESIUM SERPL-MCNC: 2.2 MG/DL (ref 1.9–2.7)
MCH RBC QN AUTO: 29 PG (ref 26.8–34.3)
MCHC RBC AUTO-ENTMCNC: 32.5 G/DL (ref 31.4–37.4)
MCV RBC AUTO: 89 FL (ref 82–98)
MONOCYTES # BLD AUTO: 0.94 THOUSAND/ÂΜL (ref 0.17–1.22)
MONOCYTES NFR BLD AUTO: 11 % (ref 4–12)
NEUTROPHILS # BLD AUTO: 5.26 THOUSANDS/ÂΜL (ref 1.85–7.62)
NEUTS SEG NFR BLD AUTO: 64 % (ref 43–75)
NRBC BLD AUTO-RTO: 0 /100 WBCS
PHOSPHATE SERPL-MCNC: 7.4 MG/DL (ref 2.3–4.1)
PLATELET # BLD AUTO: 241 THOUSANDS/UL (ref 149–390)
PMV BLD AUTO: 10.2 FL (ref 8.9–12.7)
POTASSIUM SERPL-SCNC: 4.7 MMOL/L (ref 3.5–5.3)
RBC # BLD AUTO: 2.97 MILLION/UL (ref 3.88–5.62)
SODIUM SERPL-SCNC: 130 MMOL/L (ref 135–147)
WBC # BLD AUTO: 8.31 THOUSAND/UL (ref 4.31–10.16)

## 2025-02-25 PROCEDURE — 99232 SBSQ HOSP IP/OBS MODERATE 35: CPT | Performed by: INTERNAL MEDICINE

## 2025-02-25 PROCEDURE — 87147 CULTURE TYPE IMMUNOLOGIC: CPT | Performed by: INTERNAL MEDICINE

## 2025-02-25 PROCEDURE — 85025 COMPLETE CBC W/AUTO DIFF WBC: CPT | Performed by: PHYSICIAN ASSISTANT

## 2025-02-25 PROCEDURE — 82948 REAGENT STRIP/BLOOD GLUCOSE: CPT

## 2025-02-25 PROCEDURE — G0257 UNSCHED DIALYSIS ESRD PT HOS: HCPCS

## 2025-02-25 PROCEDURE — 87081 CULTURE SCREEN ONLY: CPT | Performed by: INTERNAL MEDICINE

## 2025-02-25 PROCEDURE — 83735 ASSAY OF MAGNESIUM: CPT | Performed by: PHYSICIAN ASSISTANT

## 2025-02-25 PROCEDURE — 84100 ASSAY OF PHOSPHORUS: CPT | Performed by: PHYSICIAN ASSISTANT

## 2025-02-25 PROCEDURE — 80048 BASIC METABOLIC PNL TOTAL CA: CPT | Performed by: PHYSICIAN ASSISTANT

## 2025-02-25 PROCEDURE — 5A1D70Z PERFORMANCE OF URINARY FILTRATION, INTERMITTENT, LESS THAN 6 HOURS PER DAY: ICD-10-PCS | Performed by: INTERNAL MEDICINE

## 2025-02-25 PROCEDURE — 90999 UNLISTED DIALYSIS PROCEDURE: CPT | Performed by: INTERNAL MEDICINE

## 2025-02-25 RX ADMIN — ALLOPURINOL 50 MG: 100 TABLET ORAL at 08:05

## 2025-02-25 RX ADMIN — APIXABAN 5 MG: 5 TABLET, FILM COATED ORAL at 08:08

## 2025-02-25 RX ADMIN — SEVELAMER HYDROCHLORIDE 800 MG: 800 TABLET, FILM COATED ORAL at 08:03

## 2025-02-25 RX ADMIN — BENZONATATE 100 MG: 100 CAPSULE ORAL at 08:06

## 2025-02-25 RX ADMIN — GABAPENTIN 100 MG: 100 CAPSULE ORAL at 08:05

## 2025-02-25 RX ADMIN — BENZONATATE 100 MG: 100 CAPSULE ORAL at 21:13

## 2025-02-25 RX ADMIN — SEVELAMER HYDROCHLORIDE 800 MG: 800 TABLET, FILM COATED ORAL at 16:58

## 2025-02-25 RX ADMIN — INSULIN LISPRO 1 UNITS: 100 INJECTION, SOLUTION INTRAVENOUS; SUBCUTANEOUS at 16:58

## 2025-02-25 RX ADMIN — Medication 2.5 MG: at 03:32

## 2025-02-25 RX ADMIN — NIFEDIPINE 30 MG: 30 TABLET, EXTENDED RELEASE ORAL at 17:48

## 2025-02-25 RX ADMIN — ATORVASTATIN CALCIUM 80 MG: 40 TABLET, FILM COATED ORAL at 16:58

## 2025-02-25 RX ADMIN — BENZONATATE 100 MG: 100 CAPSULE ORAL at 16:58

## 2025-02-25 RX ADMIN — APIXABAN 5 MG: 5 TABLET, FILM COATED ORAL at 01:09

## 2025-02-25 RX ADMIN — APIXABAN 5 MG: 5 TABLET, FILM COATED ORAL at 17:48

## 2025-02-25 RX ADMIN — Medication 2.5 MG: at 11:50

## 2025-02-25 RX ADMIN — GUAIFENESIN 600 MG: 600 TABLET, EXTENDED RELEASE ORAL at 21:12

## 2025-02-25 RX ADMIN — GABAPENTIN 100 MG: 100 CAPSULE ORAL at 17:49

## 2025-02-25 RX ADMIN — GUAIFENESIN 600 MG: 600 TABLET, EXTENDED RELEASE ORAL at 08:05

## 2025-02-25 RX ADMIN — ASPIRIN 81 MG CHEWABLE TABLET 81 MG: 81 TABLET CHEWABLE at 08:05

## 2025-02-25 RX ADMIN — Medication 2.5 MG: at 21:12

## 2025-02-25 NOTE — ASSESSMENT & PLAN NOTE
Lab Results   Component Value Date    EGFR 4 02/25/2025    EGFR 4 02/24/2025    EGFR 7 02/21/2025    CREATININE 11.06 (H) 02/25/2025    CREATININE 10.76 (H) 02/24/2025    CREATININE 6.83 (H) 02/21/2025   -- In center hemodialysis Tuesday Thursday Saturday at [facility]  --Access: Right IJ permacath  --Last outpatient dialysis was on Thursday, February 20th, he missed HD on Saturday due to not feeling well  --EDW 99 kg, admitted with a weight of 103 kg, UF may be limited due to blood pressure.  --Seen on dialysis this morning tolerating procedure well except having some shoulder pain.  Requesting pain medication.  Cussed with his nurse.  --Dialysis plan of care discussed with the HD nurse

## 2025-02-25 NOTE — PHYSICAL THERAPY NOTE
PT Cancellation Note       02/25/25 1346   Note Type   Note type Cancelled Session   Cancel Reasons Refusal   Additional Comments Attempted to see pt this afternoon for PT evaluation. pt refusing, reports he is not feeling up to it today. Will follow-up as schedule allows.   Licensure   [place] License Number  Lizeth Gill [id number]

## 2025-02-25 NOTE — ASSESSMENT & PLAN NOTE
-- Initially presented with a high blood pressure  --Blood pressure currently stable and normotensive  --Saturating well on room air  --Continue current management

## 2025-02-25 NOTE — ASSESSMENT & PLAN NOTE
"Patient presents due to generalized weakness/ambulatory dysfunction in the setting of missed HD sessions since he was discharged from the hospital 02/21/25 after being treated for Influenza A. He states he has not felt well enough to drive 45 minutes to his HD center.   Of note, patient with frequent readmissions due to weakness/fall/fatigue from missed HD sessions due to \"not feeling well\"   Generalized weakness is secondary to recent illness with flu and confounded by persistently missed dialysis sessions  When patient is feeling well, he is able to ambulate with walker and drive without difficulty. Sister and brother in law at bedside stating that during the winter, it is difficult for him to get to dialysis because of the ice. They are trying to push for patient to have a new HD clinic closer to his home, but patient is refusing this.   Patient lives at home alone, and frequently falls. In the past, he was not agreeable to PT/OT or having anyone come to the house. He is now \"thinking about it\"  Family requesting social support/assistance with transportation, maybe visiting aides, because they are not able to care for patient 24/7 or drive him to his HD when he isn't feeling well.   PT/OT consults   CM consult placed -- of note, sister Alpa would like to do a care team meeting tomorrow (after 3:30pm) to discuss ways to get Edward more help at home and to prevent future frequent readmissions.   "

## 2025-02-25 NOTE — CONSULTS
NEPHROLOGY HOSPITAL CONSULTATION   Naresh Carpio 64 y.o. male MRN: [id number]  Unit/Bed#: 3 [place] [address] Encounter: [id number]    Brief History of Admission -64-year-old male who was recently discharged from the hospital on February 21 returns for generalized weakness and diagnosed with influenza A.  Nephrology counts for ESRD management.    Assessment & Plan  Generalized weakness  -- Likely secondary to viral syndrome as diagnosed with influenza A.  --Management as per the primary team.  --Missed dialysis on Saturday due to not feeling well.  Type II diabetes mellitus (HCC)  Lab Results   Component Value Date    HGBA1C 5.7 (H) 02/09/2025   -- Well-controlled hemoglobin A1c 5.7    Recent Labs     02/24/25 2213 02/25/25  0747   POCGLU 92 108       Blood Sugar Average: Last 72 hrs:  (P) 100    Hypertension  -- Initially presented with a high blood pressure  --Blood pressure currently stable and normotensive  --Saturating well on room air  --Continue current management  Hyponatremia  -- Volume mediated  --Sodium stable at 130  --Monitor on dialysis  --Ultrafiltration on dialysis  Paroxysmal atrial fibrillation (HCC)  -- Management as per the primary team  Chronic diastolic (congestive) heart failure (HCC)  Wt Readings from Last 3 Encounters:   02/25/25 103 kg (226 lb 13.7 oz)   02/18/25 107 kg (235 lb)   02/11/25 108 kg (237 lb)   -- Ultrafiltration on dialysis.          ESRD (end stage renal disease) on dialysis ([facility])  Lab Results   Component Value Date    EGFR 4 02/25/2025    EGFR 4 02/24/2025    EGFR 7 02/21/2025    CREATININE 11.06 (H) 02/25/2025    CREATININE 10.76 (H) 02/24/2025    CREATININE 6.83 (H) 02/21/2025   -- In center hemodialysis Tuesday Thursday Saturday at [facility]  --Access: Right IJ permacath  --Last outpatient dialysis was on Thursday, February 20th, he missed HD on Saturday due to not feeling well  --EDW 99 kg, admitted with a weight of 103 kg, UF may be limited due to blood  pressure.  --Seen on dialysis this morning tolerating procedure well except having some shoulder pain.  Requesting pain medication.  Cussed with his nurse.  --Dialysis plan of care discussed with the HD nurse  Anemia in ESRD (end-stage renal disease)  ([facility])  Lab Results   Component Value Date    EGFR 4 02/25/2025    EGFR 4 02/24/2025    EGFR 7 02/21/2025    CREATININE 11.06 (H) 02/25/2025    CREATININE 10.76 (H) 02/24/2025    CREATININE 6.83 (H) 02/21/2025   -- Not on SURINDER as an outpatient  --Hemoglobin below target but stable  Influenza A  -- Management as per the primary team    I have reviewed the nephrology recommendations including assessment and plan, with patient and HD nurse, and we are in agreement with renal plan including the information outlined above.    HISTORY OF PRESENT ILLNESS:  Requesting Physician: Joel Dalton MD  Reason for Consult: ESRD    Naresh Carpio is a 64 y.o. male who was admitted to [facility] after presenting with generalized weakness. A renal consultation is requested today for assistance in the management of dialysis management.  Patient was recently discharged from hospital on February 21 only to return for generalized weakness.  Which led him to miss his Saturday dialysis due to not feeling well.  He was found to be diagnosed with influenza A.    PAST MEDICAL HISTORY:  Past Medical History:   Diagnosis Date    Acute cystitis 10/18/2024    Acute on chronic anemia 01/23/2022    Atrial fibrillation (HCC)     Bradycardia 09/05/2023    Diabetes mellitus ([facility])     ESRD (end stage renal disease) on dialysis ([facility])     Hematuria 10/10/2024    Hematuria 10/10/2024    Hyperkalemia 04/06/2024    Hyperkalemia 04/06/2024    Hyperlipidemia     Hypertension     Left toe amputee ([facility])     TIA (transient ischemic attack)     Toxic metabolic encephalopathy 10/08/2024       PAST SURGICAL HISTORY:  Past Surgical History:   Procedure Laterality Date    AMPUTATION Left     left foot  resection 10 years ago dr tran    HEMODIALYSIS ADULT  1/18/2025    IR LOWER EXTREMITY ANGIOGRAM  08/29/2023    IR TEMPORARY DIALYSIS CATHETER PLACEMENT  08/25/2023    IR TUNNELED CENTRAL LINE REMOVAL  08/23/2023    IR TUNNELED DIALYSIS CATHETER PLACEMENT  01/27/2022    IR TUNNELED DIALYSIS CATHETER PLACEMENT  08/30/2023    MI AMPUTATION METATARSAL W/TOE SINGLE Right 8/31/2023    Procedure: RIGHT PARTIAL 1ST RAY RESECTION WITH  WOUND VAC APPLICATION;  Surgeon: Won Parmar DPM;  Location: [facility];  Service: Podiatry       ALLERGIES:  No Known Allergies    SOCIAL HISTORY:  Social History     Substance and Sexual Activity   Alcohol Use Not Currently    Alcohol/week: 0.0 standard drinks of alcohol    Comment: 0     Social History     Substance and Sexual Activity   Drug Use Never     Social History     Tobacco Use   Smoking Status Never    Passive exposure: Never   Smokeless Tobacco Never       FAMILY HISTORY:  History reviewed. No pertinent family history.    MEDICATIONS:    Current Facility-Administered Medications:     acetaminophen (TYLENOL) tablet 650 mg, 650 mg, Oral, Q6H PRN, Niharika Russell PA-C    allopurinol (ZYLOPRIM) tablet 50 mg, 50 mg, Oral, Daily, Niharika Russell PA-C, 50 mg at 02/25/25 0805    apixaban (ELIQUIS) tablet 5 mg, 5 mg, Oral, BID, CARSON Gomez-C, 5 mg at 02/25/25 0808    aspirin chewable tablet 81 mg, 81 mg, Oral, Daily, Niharika Russell PA-C, 81 mg at 02/25/25 0805    atorvastatin (LIPITOR) tablet 80 mg, 80 mg, Oral, Daily With Dinner, CARSON Gomez-KAREEM    benzonatate (TESSALON PERLES) capsule 100 mg, 100 mg, Oral, TID, Niharika Russell PA-C, 100 mg at 02/25/25 0806    dextromethorphan-guaiFENesin (ROBITUSSIN DM) oral syrup 10 mL, 10 mL, Oral, Q4H PRN, CARSON Gomez-C, 10 mL at 02/24/25 2236    gabapentin (NEURONTIN) capsule 100 mg, 100 mg, Oral, BID, CARSON Gomez-C, 100 mg at 02/25/25 0805    guaiFENesin (MUCINEX) 12 hr tablet  600 mg, 600 mg, Oral, Q12H KEMAL, Niharika Russell PA-C, 600 mg at 02/25/25 0805    insulin lispro (HumALOG/ADMELOG) 100 units/mL subcutaneous injection 1-6 Units, 1-6 Units, Subcutaneous, TID AC **AND** Fingerstick Glucose (POCT), , , TID AC, Niharika Russell PA-C    insulin lispro (HumALOG/ADMELOG) 100 units/mL subcutaneous injection 1-6 Units, 1-6 Units, Subcutaneous, HS, Niharika Russell PA-C    NIFEdipine (PROCARDIA XL) 24 hr tablet 30 mg, 30 mg, Oral, After Dinner, Niharika Russell PA-C, 30 mg at 02/24/25 2237    oxyCODONE (ROXICODONE) split tablet 2.5 mg, 2.5 mg, Oral, Q8H PRN, Niharika Russell PA-C, 2.5 mg at 02/25/25 0332    sevelamer (RENAGEL) tablet 800 mg, 800 mg, Oral, TID With Meals, Niharika Russell PA-C, 800 mg at 02/25/25 0803    REVIEW OF SYSTEMS:  Constitutional: Negative for fatigue, anorexia, fever, chills, diaphoresis  HENT: Negative for postnasal drip  Eyes: Negative for visual disturbance.   Respiratory: Negative for cough, shortness of breath and wheezing.   Cardiovascular: Negative for chest pain, palpitations and leg swelling.   Gastrointestinal: Negative for abdominal pain, constipation, diarrhea, nausea and vomiting.   Genitourinary: No dysuria, hematuria  Endocrine: Negative for polyuria.   Musculoskeletal: Negative for arthralgias, back pain and joint swelling.   Skin: Negative for rash.   Neurological: Negative for focal weakness, headaches, dizziness.  Hematological: Negative for easy bruising or bleeding.  Psychiatric/Behavioral: Negative for confusion and sleep disturbance.   All the systems were reviewed and were negative except as documented on the HPI.    PHYSICAL EXAM:  Current Weight: Weight - Scale: 103 kg (226 lb 13.7 oz)  First Weight: Weight - Scale: 103 kg (227 lb)  Vitals:    02/25/25 0600 02/25/25 0805 02/25/25 0845 02/25/25 0900   BP:  145/63 113/66 114/81   BP Location:  Left arm Right arm Right arm   Pulse:  66 66 64   Resp:  20 18 18   Temp:   97.9 °F (36.6 °C) 97.8 °F (36.6 °C)    TempSrc:  Oral     SpO2:  96%     Weight: 103 kg (226 lb 13.7 oz)      Height:           Intake/Output Summary (Last 24 hours) at 2/25/2025 0917  Last data filed at 2/25/2025 0845  Gross per 24 hour   Intake 200 ml   Output 200 ml   Net 0 ml     Physical Exam  Vitals and nursing note reviewed. Exam conducted with a chaperone present.   Constitutional:       General: He is not in acute distress.     Appearance: He is well-developed. He is not diaphoretic.   HENT:      Head: Normocephalic and atraumatic.   Eyes:      General: No scleral icterus.     Pupils: Pupils are equal, round, and reactive to light.   Cardiovascular:      Rate and Rhythm: Normal rate and regular rhythm.      Heart sounds: Normal heart sounds. No murmur heard.     No friction rub. No gallop.   Pulmonary:      Effort: Pulmonary effort is normal. No respiratory distress.      Breath sounds: Normal breath sounds. No wheezing or rales.   Chest:      Chest wall: No tenderness.   Abdominal:      General: Bowel sounds are normal. There is no distension.      Palpations: Abdomen is soft.      Tenderness: There is no abdominal tenderness. There is no rebound.   Musculoskeletal:         General: Normal range of motion.      Cervical back: Normal range of motion and neck supple.   Skin:     Findings: No rash.   Neurological:      Mental Status: He is alert and oriented to person, place, and time.           Invasive Devices:      Lab Results:   Results from last 7 days   Lab Units 02/25/25  0519 02/24/25  1739 02/21/25  0521 02/20/25  1252 02/19/25  0632 02/18/25  0919   WBC Thousand/uL 8.31 9.15 5.17 6.11   < > 3.81*   HEMOGLOBIN g/dL 8.6* 8.6* 8.5* 9.2*   < > 9.7*   HEMATOCRIT % 26.5* 27.0* 26.0* 27.3*   < > 29.8*   PLATELETS Thousands/uL 241 211 175 190   < > 136*   POTASSIUM mmol/L 4.7 4.7 4.0 4.7   < > 6.3*   CHLORIDE mmol/L 92* 92* 92* 89*   < > 93*   CO2 mmol/L 19* 21 23 22   < > 16*   BUN mg/dL 86* 83* 41* 83*    "< > 141*   CREATININE mg/dL 11.06* 10.76* 6.83* 10.78*   < > 14.91*   CALCIUM mg/dL 8.0* 7.8* 7.6* 7.2*   < > 7.3*   MAGNESIUM mg/dL 2.2  --   --   --   --   --    PHOSPHORUS mg/dL 7.4*  --   --   --   --   --    ALK PHOS U/L  --  79  --  88  --  99   ALT U/L  --  12  --  22  --  19   AST U/L  --  10*  --  20  --  16    < > = values in this interval not displayed.         Portions of the record may have been created with voice recognition software. Occasional wrong word or \"sound a like\" substitutions may have occurred due to the inherent limitations of voice recognition software. Read the chart carefully and recognize, using context, where substitutions have occurred.If you have any questions, please contact the dictating provider.    "

## 2025-02-25 NOTE — PROGRESS NOTES
"Progress Note - Hospitalist   Name: Naresh Carpio 64 y.o. male I MRN: [id number]  Unit/Bed#: 3 [address] I Date of Admission: 2/24/2025   Date of Service: 2/25/2025 I Hospital Day: 0    Assessment & Plan  Generalized weakness  Patient presents due to generalized weakness/ambulatory dysfunction in the setting of missed HD sessions since he was discharged from the hospital 02/21/25 after being treated for Influenza A. He states he has not felt well enough to drive 45 minutes to his HD center.   Of note, patient with frequent readmissions due to weakness/fall/fatigue from missed HD sessions due to \"not feeling well\"   Generalized weakness is secondary to recent illness with flu and confounded by persistently missed dialysis sessions  When patient is feeling well, he is able to ambulate with walker and drive without difficulty. Sister and brother in law at bedside stating that during the winter, it is difficult for him to get to dialysis because of the ice. They are trying to push for patient to have a new HD clinic closer to his home, but patient is refusing this.   Patient lives at home alone, and frequently falls. In the past, he was not agreeable to PT/OT or having anyone come to the house.   Family requesting social support/assistance with transportation, maybe visiting aides, because they are not able to care for patient 24/7 or drive him to his HD when he isn't feeling well.   Discussed with patient who reported that he makes his own decisions and wants to keep his dialysis center in [place].  ESRD (end stage renal disease) on dialysis (HCC)  Lab Results   Component Value Date    EGFR 4 02/25/2025    EGFR 4 02/24/2025    EGFR 7 02/21/2025    CREATININE 11.06 (H) 02/25/2025    CREATININE 10.76 (H) 02/24/2025    CREATININE 6.83 (H) 02/21/2025     ESRD on HD T/Th/Sat. Missed HD x 2 since he was discharged from the hospital on 02/21 due to \"feeling sick from the flu\".   Patient is not agreeable to switch HD " clinic to a closer facility, and family are not able to drive him to dialysis all the time.   Nephrology consulted for HD   Continue renal diet  Patient received hemodialysis today    Influenza A  Influenza A + on 02/19, completed course of Tamiflu   Endorsing generalized weakness and persistent cough due to this   No fever/chills at home   Supportive care   Mucinex, Tessalon perles TID, PRN Robitussin DM  PT/OT  Chronic diastolic (congestive) heart failure (HCC)  Wt Readings from Last 3 Encounters:   02/25/25 103 kg (226 lb 13.7 oz)   02/18/25 107 kg (235 lb)   02/11/25 108 kg (237 lb)     Not in acute exacerbation   Volume management with HD   I/O, Daily weights          Type II diabetes mellitus (HCC)  Lab Results   Component Value Date    HGBA1C 5.7 (H) 02/09/2025       Recent Labs     02/24/25  2213 02/25/25  0747 02/25/25  1144 02/25/25  1614   POCGLU 92 108 104 162*       Blood Sugar Average: Last 72 hrs:  (P) 116.5  Well controlled with diet   SSI with accuchecks   Avoid hypoglycemia   Blood sugars are stable    Hypertension  Continue Nifedipine   Hyponatremia  Chronic hyponatremia 2/2 ESRD   Management with HD  Paroxysmal atrial fibrillation (HCC)  Rate controlled   Continue Eliquis, Nifedipine  Anemia in ESRD (end-stage renal disease)  (HCC)  Hgb stable at baseline 8-9  PRN Epogen with HD   Monitor CBC     VTE Pharmacologic Prophylaxis: VTE Score: 4 Moderate Risk (Score 3-4) - Pharmacological DVT Prophylaxis Ordered: apixaban (Eliquis).    Mobility:   Basic Mobility Inpatient Raw Score: 23  JH-HLM Goal: 7: Walk 25 feet or more  JH-HLM Achieved: 7: Walk 25 feet or more  JH-HLM Goal achieved. Continue to encourage appropriate mobility.    Patient Centered Rounds: I performed bedside rounds with nursing staff today.   Discussions with Specialists or Other Care Team Provider: yes    Education and Discussions with Family / Patient: yes    Current Length of Stay: 0 day(s)  Current Patient Status: Inpatient    Certification Statement: The patient will continue to require additional inpatient hospital stay due to generalized weakness, ESRD with missed hemodialysis  Discharge Plan: Anticipate discharge in 24-48 hrs to home.    Code Status: Level 1 - Full Code    Subjective   Patient still having some cough.  Also complains of left shoulder pain    Objective :  Temp:  [97.5 °F (36.4 °C)-98.8 °F (37.1 °C)] 98.8 °F (37.1 °C)  HR:  [61-79] 72  BP: (113-188)/(56-85) 147/68  Resp:  [16-20] 19  SpO2:  [96 %-100 %] 97 %  O2 Device: None (Room air)    Body mass index is 29.93 kg/m².     Input and Output Summary (last 24 hours):     Intake/Output Summary (Last 24 hours) at 2/25/2025 1715  Last data filed at 2/25/2025 1237  Gross per 24 hour   Intake 500 ml   Output 2700 ml   Net -2200 ml       Physical Exam  Constitutional:       Appearance: Normal appearance.   HENT:      Head: Normocephalic and atraumatic.   Eyes:      Extraocular Movements: Extraocular movements intact.      Pupils: Pupils are equal, round, and reactive to light.   Cardiovascular:      Rate and Rhythm: Normal rate and regular rhythm.      Heart sounds: No murmur heard.     No gallop.   Pulmonary:      Effort: Pulmonary effort is normal.      Breath sounds: Normal breath sounds.   Abdominal:      General: Bowel sounds are normal.      Palpations: Abdomen is soft.      Tenderness: There is no abdominal tenderness.   Musculoskeletal:         General: No swelling or deformity. Normal range of motion.      Cervical back: Normal range of motion and neck supple.   Skin:     General: Skin is warm and dry.   Neurological:      General: No focal deficit present.      Mental Status: He is alert.           Lines/Drains:  Lines/Drains/Airways       Active Status       Name Placement date Placement time Site Days    HD Permanent Double Catheter 08/30/23  --  Internal jugular  545                            Lab Results: I have reviewed the following results:   Results from  last 7 days   Lab Units 02/25/25  0519   WBC Thousand/uL 8.31   HEMOGLOBIN g/dL 8.6*   HEMATOCRIT % 26.5*   PLATELETS Thousands/uL 241   SEGS PCT % 64   LYMPHO PCT % 19   MONO PCT % 11   EOS PCT % 6     Results from last 7 days   Lab Units 02/25/25  0519 02/24/25  1739   SODIUM mmol/L 130* 129*   POTASSIUM mmol/L 4.7 4.7   CHLORIDE mmol/L 92* 92*   CO2 mmol/L 19* 21   BUN mg/dL 86* 83*   CREATININE mg/dL 11.06* 10.76*   ANION GAP mmol/L 19* 16*   CALCIUM mg/dL 8.0* 7.8*   ALBUMIN g/dL  --  3.8   TOTAL BILIRUBIN mg/dL  --  0.30   ALK PHOS U/L  --  79   ALT U/L  --  12   AST U/L  --  10*   GLUCOSE RANDOM mg/dL 86 122         Results from last 7 days   Lab Units 02/25/25  1614 02/25/25  1144 02/25/25  0747 02/24/25  2213 02/21/25  1145 02/21/25  0812 02/20/25  2049 02/20/25  1647 02/20/25  1119 02/20/25  0801 02/19/25  2119 02/19/25  1624   POC GLUCOSE mg/dl 162* 104 108 92 117 79 97 108 117 85 83 103               Recent Cultures (last 7 days):         Imaging Results Review: I reviewed radiology reports from this admission including: chest xray.      Last 24 Hours Medication List:     Current Facility-Administered Medications:     acetaminophen (TYLENOL) tablet 650 mg, Q6H PRN    allopurinol (ZYLOPRIM) tablet 50 mg, Daily    apixaban (ELIQUIS) tablet 5 mg, BID    aspirin chewable tablet 81 mg, Daily    atorvastatin (LIPITOR) tablet 80 mg, Daily With Dinner    benzonatate (TESSALON PERLES) capsule 100 mg, TID    dextromethorphan-guaiFENesin (ROBITUSSIN DM) oral syrup 10 mL, Q4H PRN    gabapentin (NEURONTIN) capsule 100 mg, BID    guaiFENesin (MUCINEX) 12 hr tablet 600 mg, Q12H KEMAL    insulin lispro (HumALOG/ADMELOG) 100 units/mL subcutaneous injection 1-6 Units, TID AC **AND** Fingerstick Glucose (POCT), TID AC    insulin lispro (HumALOG/ADMELOG) 100 units/mL subcutaneous injection 1-6 Units, HS    NIFEdipine (PROCARDIA XL) 24 hr tablet 30 mg, After Dinner    oxyCODONE (ROXICODONE) split tablet 2.5 mg, Q8H PRN     sevelamer (RENAGEL) tablet 800 mg, TID With Meals    Administrative Statements   Today, Patient Was Seen By: Joel Dalton MD      **Please Note: This note may have been constructed using a voice recognition system.**

## 2025-02-25 NOTE — ASSESSMENT & PLAN NOTE
Influenza A + on 02/19, completed course of Tamiflu   Endorsing generalized weakness and persistent cough due to this   No fever/chills at home   Supportive care   Mucinex, Tessalon perles TITOPHER, GINAN Annetta DM  PT/OT

## 2025-02-25 NOTE — ASSESSMENT & PLAN NOTE
Lab Results   Component Value Date    EGFR 4 02/25/2025    EGFR 4 02/24/2025    EGFR 7 02/21/2025    CREATININE 11.06 (H) 02/25/2025    CREATININE 10.76 (H) 02/24/2025    CREATININE 6.83 (H) 02/21/2025   -- Not on SURINDER as an outpatient  --Hemoglobin below target but stable

## 2025-02-25 NOTE — PLAN OF CARE
Problem: PAIN - ADULT  Goal: Verbalizes/displays adequate comfort level or baseline comfort level  Description: Interventions:  - Encourage patient to monitor pain and request assistance  - Assess pain using appropriate pain scale  - Administer analgesics based on type and severity of pain and evaluate response  - Implement non-pharmacological measures as appropriate and evaluate response  - Consider cultural and social influences on pain and pain management  - Notify physician/advanced practitioner if interventions unsuccessful or patient reports new pain  Outcome: Progressing     Problem: INFECTION - ADULT  Goal: Absence or prevention of progression during hospitalization  Description: INTERVENTIONS:  - Assess and monitor for signs and symptoms of infection  - Monitor lab/diagnostic results  - Monitor all insertion sites, i.e. indwelling lines, tubes, and drains  - Monitor endotracheal if appropriate and nasal secretions for changes in amount and color  - [place] appropriate cooling/warming therapies per order  - Administer medications as ordered  - Instruct and encourage patient and family to use good hand hygiene technique  - Identify and instruct in appropriate isolation precautions for identified infection/condition  Outcome: Progressing  Goal: Absence of fever/infection during neutropenic period  Description: INTERVENTIONS:  - Monitor WBC    Outcome: Progressing     Problem: SAFETY ADULT  Goal: Patient will remain free of falls  Description: INTERVENTIONS:  - Educate patient/family on patient safety including physical limitations  - Instruct patient to call for assistance with activity   - Consult OT/PT to assist with strengthening/mobility   - Keep Call bell within reach  - Keep bed low and locked with side rails adjusted as appropriate  - Keep care items and personal belongings within reach  - Initiate and maintain comfort rounds  - Make Fall Risk Sign visible to staff  - Offer Toileting every ***  Hours, in advance of need  - Initiate/Maintain ***alarm  - Obtain necessary fall risk management equipment: ***  - Apply yellow socks and bracelet for high fall risk patients  - Consider moving patient to room near nurses station  Outcome: Progressing  Goal: Maintain or return to baseline ADL function  Description: INTERVENTIONS:  -  Assess patient's ability to carry out ADLs; assess patient's baseline for ADL function and identify physical deficits which impact ability to perform ADLs (bathing, care of mouth/teeth, toileting, grooming, dressing, etc.)  - Assess/evaluate cause of self-care deficits   - Assess range of motion  - Assess patient's mobility; develop plan if impaired  - Assess patient's need for assistive devices and provide as appropriate  - Encourage maximum independence but intervene and supervise when necessary  - Involve family in performance of ADLs  - Assess for home care needs following discharge   - Consider OT consult to assist with ADL evaluation and planning for discharge  - Provide patient education as appropriate  Outcome: Progressing  Goal: Maintains/Returns to pre admission functional level  Description: INTERVENTIONS:  - Perform AM-PAC 6 Click Basic Mobility/ Daily Activity assessment daily.  - Set and communicate daily mobility goal to care team and patient/family/caregiver.   - Collaborate with rehabilitation services on mobility goals if consulted  - Out of bed for toileting  - Record patient progress and toleration of activity level   Outcome: Progressing     Problem: DISCHARGE PLANNING  Goal: Discharge to home or other facility with appropriate resources  Description: INTERVENTIONS:  - Identify barriers to discharge w/patient and caregiver  - Arrange for needed discharge resources and transportation as appropriate  - Identify discharge learning needs (meds, wound care, etc.)  - Arrange for interpretive services to assist at discharge as needed  - Refer to Case Management Department  for coordinating discharge planning if the patient needs post-hospital services based on physician/advanced practitioner order or complex needs related to functional status, cognitive ability, or social support system  Outcome: Progressing     Problem: Knowledge Deficit  Goal: Patient/family/caregiver demonstrates understanding of disease process, treatment plan, medications, and discharge instructions  Description: Complete learning assessment and assess knowledge base.  Interventions:  - Provide teaching at level of understanding  - Provide teaching via preferred learning methods  Outcome: Progressing     Problem: MOBILITY - ADULT  Goal: Maintain or return to baseline ADL function  Description: INTERVENTIONS:  -  Assess patient's ability to carry out ADLs; assess patient's baseline for ADL function and identify physical deficits which impact ability to perform ADLs (bathing, care of mouth/teeth, toileting, grooming, dressing, etc.)  - Assess/evaluate cause of self-care deficits   - Assess range of motion  - Assess patient's mobility; develop plan if impaired  - Assess patient's need for assistive devices and provide as appropriate  - Encourage maximum independence but intervene and supervise when necessary  - Involve family in performance of ADLs  - Assess for home care needs following discharge   - Consider OT consult to assist with ADL evaluation and planning for discharge  - Provide patient education as appropriate  Outcome: Progressing  Goal: Maintains/Returns to pre admission functional level  Description: INTERVENTIONS:  - Perform AM-PAC 6 Click Basic Mobility/ Daily Activity assessment daily.  - Set and communicate daily mobility goal to care team and patient/family/caregiver.   - Collaborate with rehabilitation services on mobility goals if consulted  - Out of bed for toileting  - Record patient progress and toleration of activity level   Outcome: Progressing

## 2025-02-25 NOTE — ASSESSMENT & PLAN NOTE
Wt Readings from Last 3 Encounters:   02/25/25 103 kg (226 lb 13.7 oz)   02/18/25 107 kg (235 lb)   02/11/25 108 kg (237 lb)   -- Ultrafiltration on dialysis.

## 2025-02-25 NOTE — ASSESSMENT & PLAN NOTE
"Patient presents due to generalized weakness/ambulatory dysfunction in the setting of missed HD sessions since he was discharged from the hospital 02/21/25 after being treated for Influenza A. He states he has not felt well enough to drive 45 minutes to his HD center.   Of note, patient with frequent readmissions due to weakness/fall/fatigue from missed HD sessions due to \"not feeling well\"   Generalized weakness is secondary to recent illness with flu and confounded by persistently missed dialysis sessions  When patient is feeling well, he is able to ambulate with walker and drive without difficulty. Sister and brother in law at bedside stating that during the winter, it is difficult for him to get to dialysis because of the ice. They are trying to push for patient to have a new HD clinic closer to his home, but patient is refusing this.   Patient lives at home alone, and frequently falls. In the past, he was not agreeable to PT/OT or having anyone come to the house.   Family requesting social support/assistance with transportation, maybe visiting aides, because they are not able to care for patient 24/7 or drive him to his HD when he isn't feeling well.   Discussed with patient who reported that he makes his own decisions and wants to keep his dialysis center in [place].  "

## 2025-02-25 NOTE — ASSESSMENT & PLAN NOTE
Wt Readings from Last 3 Encounters:   02/25/25 103 kg (226 lb 13.7 oz)   02/18/25 107 kg (235 lb)   02/11/25 108 kg (237 lb)     Not in acute exacerbation   Volume management with HD   I/O, Daily weights

## 2025-02-25 NOTE — ASSESSMENT & PLAN NOTE
Lab Results   Component Value Date    HGBA1C 5.7 (H) 02/09/2025       Recent Labs     02/24/25  2213 02/25/25  0747 02/25/25  1144 02/25/25  1614   POCGLU 92 108 104 162*       Blood Sugar Average: Last 72 hrs:  (P) 116.5  Well controlled with diet   SSI with accuchecks   Avoid hypoglycemia   Blood sugars are stable

## 2025-02-25 NOTE — OCCUPATIONAL THERAPY NOTE
OT EVALUATION       02/25/25 1524   Note Type   Note type Cancelled Session   Cancel Reasons Refusal   Additional Comments Attempted OT evaluation, however, pt declined reports he is not up to it today.  Will follow.   Licensure   [place] License Number  Lizeth Phillips MS OTR/L [id number]

## 2025-02-25 NOTE — ASSESSMENT & PLAN NOTE
-- Likely secondary to viral syndrome as diagnosed with influenza A.  --Management as per the primary team.  --Missed dialysis on Saturday due to not feeling well.   Spoke with [place] form [facility] [phone] to get authorization for Esomeprazole Mag 20mg Cap. Per [place] form [facility] medication was denied by insurance.

## 2025-02-25 NOTE — ASSESSMENT & PLAN NOTE
Lab Results   Component Value Date    HGBA1C 5.7 (H) 02/09/2025   -- Well-controlled hemoglobin A1c 5.7    Recent Labs     02/24/25 2213 02/25/25  0747   POCGLU 92 108       Blood Sugar Average: Last 72 hrs:  (P) 100

## 2025-02-25 NOTE — H&P
"H&P - Hospitalist   Name: Naresh Carpio 64 y.o. male I MRN: [id number]  Unit/Bed#: [address] [place] [address] I Date of Admission: 2/24/2025   Date of Service: 2/25/2025 I Hospital Day: 0     Assessment & Plan  Generalized weakness  Patient presents due to generalized weakness/ambulatory dysfunction in the setting of missed HD sessions since he was discharged from the hospital 02/21/25 after being treated for Influenza A. He states he has not felt well enough to drive 45 minutes to his HD center.   Of note, patient with frequent readmissions due to weakness/fall/fatigue from missed HD sessions due to \"not feeling well\"   Generalized weakness is secondary to recent illness with flu and confounded by persistently missed dialysis sessions  When patient is feeling well, he is able to ambulate with walker and drive without difficulty. Sister and brother in law at bedside stating that during the winter, it is difficult for him to get to dialysis because of the ice. They are trying to push for patient to have a new HD clinic closer to his home, but patient is refusing this.   Patient lives at home alone, and frequently falls. In the past, he was not agreeable to PT/OT or having anyone come to the house. He is now \"thinking about it\"  Family requesting social support/assistance with transportation, maybe visiting aides, because they are not able to care for patient 24/7 or drive him to his HD when he isn't feeling well.   PT/OT consults   CM consult placed -- of note, sister Alpa would like to do a care team meeting tomorrow (after 3:30pm) to discuss ways to get Edward more help at home and to prevent future frequent readmissions.   ESRD (end stage renal disease) on dialysis (HCC)  Lab Results   Component Value Date    EGFR 4 02/24/2025    EGFR 7 02/21/2025    EGFR 4 02/20/2025    CREATININE 10.76 (H) 02/24/2025    CREATININE 6.83 (H) 02/21/2025    CREATININE 10.78 (H) 02/20/2025     ESRD on HD T/Th/Sat. Missed HD x 2 since he was " "discharged from the hospital on 02/21 due to \"feeling sick from the flu\". No emergent need for dialysis at this time as patient is not volume overloaded, no hyperkalemia, no uremia sx.   Patient is not agreeable to switch HD clinic to a closer facility, and family are not able to drive him to dialysis all the time.   Nephrology consult for HD   Renal diet with fluid and K restriction ordered   Case management consult for assistance with frequent readmissions due to missed HD  Influenza A  Influenza A + on 02/19, completed course of Tamiflu   Endorsing generalized weakness and persistent cough due to this   No fever/chills at home   Supportive care   Mucinex, Tessalon perles TID, PRN Robitussin DM  PT/OT  Chronic diastolic (congestive) heart failure (HCC)  Wt Readings from Last 3 Encounters:   02/24/25 103 kg (227 lb)   02/18/25 107 kg (235 lb)   02/11/25 108 kg (237 lb)     Not in acute exacerbation   Volume management with HD   I/O, Daily weights          Type II diabetes mellitus (HCC)  Lab Results   Component Value Date    HGBA1C 5.7 (H) 02/09/2025       Recent Labs     02/24/25  2213   POCGLU 92       Blood Sugar Average: Last 72 hrs:  (P) 92  Well controlled with diet   SSI with accuchecks   Avoid hypoglycemia     Hypertension  Continue Nifedipine   Hyponatremia  Chronic hyponatremia 2/2 ESRD   Management with HD  Paroxysmal atrial fibrillation (HCC)  Rate controlled   Continue Eliquis, Nifedipine  Anemia in ESRD (end-stage renal disease)  (HCC)  Hgb stable at baseline 8-9  PRN Epogen with HD   Monitor CBC       VTE Pharmacologic Prophylaxis: VTE Score: 4 Moderate Risk (Score 3-4) - Pharmacological DVT Prophylaxis Ordered: apixaban (Eliquis).  Code Status: Level 1 - Full Code   Discussion with family: Updated  (sister and brother in law) at bedside.    Anticipated Length of Stay: Patient will be admitted on an observation basis with an anticipated length of stay of less than 2 midnights secondary " "to generalized weakness/ambulatory dysfunction, missed HD.    History of Present Illness   Chief Complaint: generalized weakness     Naresh Carpio is a 64 y.o. male with a PMH of ESRD on HD, CHF, T2DM, afib on Eliquis, HTN, HLD, anemia who presents with generalized weakness, ambulatory dysfunction, and fall at home today along with missed HD sessions Patient was recently discharged from the hospital 02/21 due to generalized weakness and missed HD sessions due to influenza A. Completed course of Tamiflu. He states that he was not ready to be discharged because he still felt sick from the flu. Since then, he did not go to HD on Saturday or today (T/Th/S schedule) due to feeling sick from the flu. Sister and brother in law at bedside who expressed frustration with patient's care and frequent readmission to the hospital. They stated that Naresh lives at home alone in an apartment that is not well equipped to support him due to his frequent falls and chronic ambulatory dysfunction. Additionally, they state that when he is not feeling well, it is impossible for him to drive to his dialysis center which is 45 min away from his house. However, the patient adamantly refuses to change dialysis clinic to a closer facility. Family also state that they are not able to drive the patient to his dialysis when he is not feeling well due to their own family obligations/work/etc. Sister said she though Naresh was going to die today. She states she called him confused, and fell. She states she needs help and can't continue doing this anymore because she can't pick him up off the floor on her own and is not always able to come to his apartment to help him. She is hoping to get transportation set up for dialysis, PT/OT, and visiting aides to the house to assist with ADLs. They are also requesting to have a \"family meeting\" tomorrow including case management and medical team to discuss options for Naresh to have a safe discharge plan and " to prevent readmissions.     Review of Systems   Constitutional:  Positive for activity change and fatigue. Negative for chills and fever.   Respiratory:  Positive for cough. Negative for shortness of breath.    Cardiovascular:  Negative for chest pain, palpitations and leg swelling.   Gastrointestinal:  Negative for abdominal pain, diarrhea, nausea and vomiting.   Genitourinary:  Negative for dysuria.   Musculoskeletal:  Positive for gait problem.   Neurological:  Positive for weakness.   Psychiatric/Behavioral:  Negative for confusion.         Historical Information   Past Medical History:   Diagnosis Date    Acute cystitis 10/18/2024    Acute on chronic anemia 01/23/2022    Atrial fibrillation (HCC)     Bradycardia 09/05/2023    Diabetes mellitus (HCC)     ESRD (end stage renal disease) on dialysis (HCC)     Hematuria 10/10/2024    Hematuria 10/10/2024    Hyperkalemia 04/06/2024    Hyperkalemia 04/06/2024    Hyperlipidemia     Hypertension     Left toe amputee (HCC)     TIA (transient ischemic attack)     Toxic metabolic encephalopathy 10/08/2024     Past Surgical History:   Procedure Laterality Date    AMPUTATION Left     left foot resection 10 years ago dr tran    HEMODIALYSIS ADULT  1/18/2025    IR LOWER EXTREMITY ANGIOGRAM  08/29/2023    IR TEMPORARY DIALYSIS CATHETER PLACEMENT  08/25/2023    IR TUNNELED CENTRAL LINE REMOVAL  08/23/2023    IR TUNNELED DIALYSIS CATHETER PLACEMENT  01/27/2022    IR TUNNELED DIALYSIS CATHETER PLACEMENT  08/30/2023    GA AMPUTATION METATARSAL W/TOE SINGLE Right 8/31/2023    Procedure: RIGHT PARTIAL 1ST RAY RESECTION WITH  WOUND VAC APPLICATION;  Surgeon: Won Parmar DPM;  Location: [facility];  Service: Podiatry     Social History     Tobacco Use    Smoking status: Never     Passive exposure: Never    Smokeless tobacco: Never   Vaping Use    Vaping status: Never Used   Substance and Sexual Activity    Alcohol use: Not Currently     Alcohol/week: 0.0 standard drinks of  alcohol     Comment: 0    Drug use: Never    Sexual activity: Not on file     E-Cigarette/Vaping    E-Cigarette Use Never User      E-Cigarette/Vaping Substances    Nicotine No     THC No     CBD No     Flavoring No     Other No     Unknown No      History reviewed. No pertinent family history.  Social History:  Marital Status:    Patient Pre-hospital Living Situation: Home  Patient Pre-hospital Level of Mobility: walks with walker  Patient Pre-hospital Diet Restrictions: diabetic, renal    Meds/Allergies   I have reviewed home medications using recent Epic encounter.  Prior to Admission medications    Medication Sig Start Date End Date Taking? Authorizing Provider   allopurinol (ZYLOPRIM) 100 mg tablet Take 1 tablet (100 mg total) by mouth daily 3/22/24   Lore Silver DO   apixaban (Eliquis) 5 mg Take 1 tablet (5 mg total) by mouth 2 (two) times a day 2/10/25   Nedra Shetty MD   aspirin 81 mg chewable tablet Chew 1 tablet (81 mg total) daily 10/23/24   Mir Salinas MD   atorvastatin (LIPITOR) 80 mg tablet Take 80 mg by mouth daily 11/2/21   Historical Provider, MD   gabapentin (NEURONTIN) 100 mg capsule Take 2 capsules (200 mg total) by mouth 2 (two) times a day 3/22/24 11/19/24  Lore Silver DO   NIFEdipine (PROCARDIA XL) 30 mg 24 hr tablet Take 1 tablet (30 mg total) by mouth daily after dinner 12/2/24   Nallely Pisano PA-C   sevelamer (RENAGEL) 800 mg tablet Take 1 tablet (800 mg total) by mouth 3 (three) times a day with meals 2/2/22   Lore Silver DO     No Known Allergies    Objective :  Temp:  [97.5 °F (36.4 °C)-98 °F (36.7 °C)] 97.5 °F (36.4 °C)  HR:  [77-79] 79  BP: (188-191)/(80-85) 188/85  Resp:  [16-20] 20  SpO2:  [99 %-100 %] 100 %  O2 Device: None (Room air)    Physical Exam  Constitutional:       General: He is not in acute distress.     Appearance: He is not ill-appearing, toxic-appearing or diaphoretic.   HENT:      Mouth/Throat:      Mouth: Mucous membranes are dry.    Eyes:      General: No scleral icterus.  Cardiovascular:      Rate and Rhythm: Normal rate and regular rhythm.      Heart sounds: Normal heart sounds.   Pulmonary:      Effort: Pulmonary effort is normal. No respiratory distress.      Breath sounds: No wheezing, rhonchi or rales.   Abdominal:      General: Abdomen is flat. Bowel sounds are normal.      Palpations: Abdomen is soft.      Tenderness: There is no abdominal tenderness.   Musculoskeletal:      Right lower leg: No edema.      Left lower leg: No edema.   Skin:     General: Skin is warm and dry.   Neurological:      General: No focal deficit present.      Mental Status: He is alert and oriented to person, place, and time.   Psychiatric:         Mood and Affect: Affect is flat.          Lines/Drains:            Lab Results: I have reviewed the following results:  Results from last 7 days   Lab Units 02/24/25  1739   WBC Thousand/uL 9.15   HEMOGLOBIN g/dL 8.6*   HEMATOCRIT % 27.0*   PLATELETS Thousands/uL 211   SEGS PCT % 77*   LYMPHO PCT % 9*   MONO PCT % 10   EOS PCT % 3     Results from last 7 days   Lab Units 02/24/25  1739   SODIUM mmol/L 129*   POTASSIUM mmol/L 4.7   CHLORIDE mmol/L 92*   CO2 mmol/L 21   BUN mg/dL 83*   CREATININE mg/dL 10.76*   ANION GAP mmol/L 16*   CALCIUM mg/dL 7.8*   ALBUMIN g/dL 3.8   TOTAL BILIRUBIN mg/dL 0.30   ALK PHOS U/L 79   ALT U/L 12   AST U/L 10*   GLUCOSE RANDOM mg/dL 122         Results from last 7 days   Lab Units 02/24/25  2213 02/21/25  1145 02/21/25  0812 02/20/25  2049 02/20/25  1647 02/20/25  1119 02/20/25  0801 02/19/25  2119 02/19/25  1624 02/19/25  1055 02/19/25  0720 02/18/25  2201   POC GLUCOSE mg/dl 92 117 79 97 108 117 85 83 103 104 85 124     Lab Results   Component Value Date    HGBA1C 5.7 (H) 02/09/2025    HGBA1C 5.5 10/08/2024    HGBA1C 6.2 (H) 07/02/2024           Imaging Results Review: I reviewed radiology reports from this admission including: chest xray.  Other Study Results Review: No additional  pertinent studies reviewed.    Administrative Statements   I have spent a total time of 75 minutes in caring for this patient on the day of the visit/encounter including Diagnostic results, Prognosis, Risks and benefits of tx options, Instructions for management, Patient and family education, Importance of tx compliance, Risk factor reductions, Impressions, Counseling / Coordination of care, Documenting in the medical record, Reviewing/placing orders in the medical record (including tests, medications, and/or procedures), Obtaining or reviewing history  , and Communicating with other healthcare professionals .    ** Please Note: This note has been constructed using a voice recognition system. **

## 2025-02-25 NOTE — ASSESSMENT & PLAN NOTE
"Lab Results   Component Value Date    EGFR 4 02/25/2025    EGFR 4 02/24/2025    EGFR 7 02/21/2025    CREATININE 11.06 (H) 02/25/2025    CREATININE 10.76 (H) 02/24/2025    CREATININE 6.83 (H) 02/21/2025     ESRD on HD T/Th/Sat. Missed HD x 2 since he was discharged from the hospital on 02/21 due to \"feeling sick from the flu\".   Patient is not agreeable to switch HD clinic to a closer facility, and family are not able to drive him to dialysis all the time.   Nephrology consulted for HD   Continue renal diet  Patient received hemodialysis today    "

## 2025-02-25 NOTE — CASE MANAGEMENT
Case Management Assessment & Discharge Planning Note    Patient name Naresh Carpio  Location 3 [place] 323/3 [place] 323-* MRN [id number]  : 1960 Date 2025       Current Admission Date: 2025  Current Admission Diagnosis:Generalized weakness   Patient Active Problem List    Diagnosis Date Noted Date Diagnosed    Generalized weakness 2025     Influenza A 2025     Gout 2025     Closed fracture of proximal end of left humerus with routine healing 02/10/2025     Left shoulder pain 2025     ESRD (end stage renal disease) on dialysis ([facility]) 2025     Primary hypertension 2025     Anemia in ESRD (end-stage renal disease)  ([facility]) 2025     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, on chronic dialysis ([facility]) 2025     Renal lesion 2024     Chronic wound 2024     Chronic diastolic (congestive) heart failure ([facility]) 2024     Pulmonary edema 2024     Pleural effusion 2024     Elevated troponin 2024     Fall 2024     Melena 2024     Paroxysmal atrial fibrillation ([facility])      ESRD (end stage renal disease) ([facility]) 10/25/2024     Nausea 10/20/2024     Hyponatremia 10/19/2024     Chronic kidney disease-mineral and bone disorder 10/14/2024     Falls 10/08/2024     PAD (peripheral artery disease) ([facility]) 10/08/2024     Closed fracture of right pelvis ([facility]) 2024     Bilateral sacral fracture, closed ([facility]) 2024     Difficulty with speech 2024     History of amputation of hallux ([facility]) 2024     Hypertensive urgency 2024     Facial cellulitis 2024     Constipation 2023     Cellulitis of right foot      Polymicrobial bacterial infection 2023     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 mellitus, with necrosis of bone ([facility])      Acquired deformity of foot, right      Charcot's joint      Subacute osteomyelitis of right  foot (HCC)      Open wound of right foot 08/21/2023     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 mellitus, with bone involvement without evidence of necrosis (HCC)      Diabetic ulcer of left midfoot associated with type 2 diabetes mellitus, limited to breakdown of skin (HCC)      Diabetic polyneuropathy associated with type 2 diabetes mellitus (HCC)      Diabetes mellitus type 2 with peripheral artery disease (HCC)      History of amputation of lesser toe of left foot (HCC)      Onychomycosis      Status post fall 08/19/2023     Traumatic rhabdomyolysis (HCC) 08/19/2023     Leukocytosis 08/19/2023     Osteoarthritis of left hip 07/27/2022     Severe Left Orchalgia 07/26/2022     Right shoulder pain 07/26/2022     Left hip pain 07/06/2022     Hemodialysis status ([facility])      Hypervolemia      Diarrhea 01/22/2022     Anemia due to chronic kidney disease, on chronic dialysis (HCC) 01/21/2022     Type II diabetes mellitus (HCC)      Hypertension      History of TIA (transient ischemic attack)      T10 vertebral fracture ([facility])        LOS (days): 0  Geometric Mean LOS (GMLOS) (days):   Days to GMLOS:     OBJECTIVE:  PATIENT READMITTED TO HOSPITAL            Current admission status: Inpatient  Referral Reason:  (High Utilizer)    Preferred Pharmacy:   [facility] [address] [place] [address] [address] [place] [address] 206  [address] [address]  Phone: [phone] Fax: [phone]    Primary Care Provider: Jeffrey Jackson    Primary Insurance: MEDICARE  Secondary Insurance:     ASSESSMENT:  Active Health Care Proxies       Alicja Carpio Health Care Representative - Sister   Primary Phone: [phone] (Mobile)                 Readmission Root Cause  30 Day Readmission: Yes  During your hospital stay, did someone (provider, nurse, ) explain your care to you in a way you could understand?: Yes  Were you able to pay for your medication at the pharmacy?: Yes  Did you have  reliable transportation to take you to your appointments?: Yes  During previous admission, was a post-acute recommendation made?: No  Patient was readmitted due to: Generalized weakness  Action Plan: HD, med management, Care team meeting     Activities of Daily Living Prior to Admission  Functional Status: Independent  Completes ADLs independently?: Yes  Ambulates independently?: Yes  Does patient use assisted devices?: Yes  Assisted Devices (DME) used: Straight Cane  Does patient currently own DME?: Yes  What DME does the patient currently own?: Straight Cane     Patient Information Continued  Income Source: SSI/SSD  Does patient have prescription coverage?: No  Does patient receive dialysis treatments?: Yes ([facility] in [place])     Means of Transportation  Means of Transport to Appts:: Drives Self    DISCHARGE DETAILS:    Discharge planning discussed with:: Patient  Freedom of Choice: Yes  Comments - Freedom of Choice: DIscharge to home when medically stable        Other Referral/Resources/Interventions Provided:  Referral Comments: RN CM met with patient at  bedside to introduce role, and discuss safe discharge planning. RN CM asked patient about safe discharge needs and to discuss barriers. Patient stated he was just feeling weak and thats why he could not go to [facility] on Sat. Discussed changing HD location closer to home and patient refused. He stated he loves going to that location and he will continue to go there. RN CM asked about family support and if any family member would be willing to contribute and asisst him with transportation on days he does not feel good. Patient stated his family is not close and sister who is close will not take him to dialysis. RN CM discussed medicaid and patient stated he was on Medicaid and was kicked off the program because he made a little too much to qualify for Medicaid. Patient also stated if he gets discharged tomm, he may not go to dialysis on Thurs if he  continues to feel weak. Attending physician made aware. Discussed to have bedside care team meeting when sister visits. RN CM will continue to follow.

## 2025-02-25 NOTE — ASSESSMENT & PLAN NOTE
Lab Results   Component Value Date    HGBA1C 5.7 (H) 02/09/2025       Recent Labs     02/24/25  2213   POCGLU 92       Blood Sugar Average: Last 72 hrs:  (P) 92  Well controlled with diet   SSI with accuchecks   Avoid hypoglycemia

## 2025-02-25 NOTE — ASSESSMENT & PLAN NOTE
"Lab Results   Component Value Date    EGFR 4 02/24/2025    EGFR 7 02/21/2025    EGFR 4 02/20/2025    CREATININE 10.76 (H) 02/24/2025    CREATININE 6.83 (H) 02/21/2025    CREATININE 10.78 (H) 02/20/2025     ESRD on HD T/Th/Sat. Missed HD x 2 since he was discharged from the hospital on 02/21 due to \"feeling sick from the flu\". No emergent need for dialysis at this time as patient is not volume overloaded, no hyperkalemia, no uremia sx.   Patient is not agreeable to switch HD clinic to a closer facility, and family are not able to drive him to dialysis all the time.   Nephrology consult for HD   Renal diet with fluid and K restriction ordered   Case management consult for assistance with frequent readmissions due to missed HD  "

## 2025-02-25 NOTE — ASSESSMENT & PLAN NOTE
Wt Readings from Last 3 Encounters:   02/24/25 103 kg (227 lb)   02/18/25 107 kg (235 lb)   02/11/25 108 kg (237 lb)     Not in acute exacerbation   Volume management with HD   I/O, Daily weights

## 2025-02-25 NOTE — PLAN OF CARE
Post-Dialysis RN Treatment Note    Blood Pressure:  Pre:  113/66 mm/Hg  Post:  132/64  mmHg   EDW:  99.0 kg    Weight:  Pre:  105.1 kg   Post: 102.5  kg   Mode of weight measurement: Bed Scale   Volume Removed: 2600 ml    Treatment duration: 210 minutes    NS given:  No    Treatment shortened No   Medications given during Rx: Oxycodone   Estimated Kt/V:  Not Applicable   Access type: Permacath/TDC   Needle Gauge: N/A   Access Issues: No    Report called to primary nurse:   Yes, Maxine    Problem: METABOLIC, FLUID AND ELECTROLYTES - ADULT  Goal: Electrolytes maintained within normal limits  Description: INTERVENTIONS:  - Monitor labs and assess patient for signs and symptoms of electrolyte imbalances  - Administer electrolyte replacement as ordered  - Monitor response to electrolyte replacements, including repeat lab results as appropriate  - Instruct patient on fluid and nutrition as appropriate  Outcome: Progressing  Goal: Fluid balance maintained  Description: INTERVENTIONS:  - Monitor labs   - Monitor I/O and WT  - Instruct patient on fluid and nutrition as appropriate  - Assess for signs & symptoms of volume excess or deficit  Outcome: Progressing

## 2025-02-26 LAB
GLUCOSE SERPL-MCNC: 104 MG/DL (ref 65–140)
GLUCOSE SERPL-MCNC: 142 MG/DL (ref 65–140)
GLUCOSE SERPL-MCNC: 274 MG/DL (ref 65–140)
GLUCOSE SERPL-MCNC: 98 MG/DL (ref 65–140)
MRSA NOSE QL CULT: ABNORMAL
MRSA NOSE QL CULT: ABNORMAL

## 2025-02-26 PROCEDURE — 99232 SBSQ HOSP IP/OBS MODERATE 35: CPT | Performed by: INTERNAL MEDICINE

## 2025-02-26 PROCEDURE — 82948 REAGENT STRIP/BLOOD GLUCOSE: CPT

## 2025-02-26 PROCEDURE — 97161 PT EVAL LOW COMPLEX 20 MIN: CPT

## 2025-02-26 RX ADMIN — ACETAMINOPHEN 650 MG: 325 TABLET ORAL at 12:54

## 2025-02-26 RX ADMIN — SEVELAMER HYDROCHLORIDE 800 MG: 800 TABLET, FILM COATED ORAL at 17:52

## 2025-02-26 RX ADMIN — BENZONATATE 100 MG: 100 CAPSULE ORAL at 21:14

## 2025-02-26 RX ADMIN — GABAPENTIN 100 MG: 100 CAPSULE ORAL at 17:53

## 2025-02-26 RX ADMIN — NIFEDIPINE 30 MG: 30 TABLET, EXTENDED RELEASE ORAL at 17:53

## 2025-02-26 RX ADMIN — INSULIN LISPRO 4 UNITS: 100 INJECTION, SOLUTION INTRAVENOUS; SUBCUTANEOUS at 11:46

## 2025-02-26 RX ADMIN — SEVELAMER HYDROCHLORIDE 800 MG: 800 TABLET, FILM COATED ORAL at 11:49

## 2025-02-26 RX ADMIN — GUAIFENESIN 600 MG: 600 TABLET, EXTENDED RELEASE ORAL at 21:14

## 2025-02-26 RX ADMIN — BENZONATATE 100 MG: 100 CAPSULE ORAL at 09:17

## 2025-02-26 RX ADMIN — ATORVASTATIN CALCIUM 80 MG: 40 TABLET, FILM COATED ORAL at 17:52

## 2025-02-26 RX ADMIN — Medication 2.5 MG: at 21:14

## 2025-02-26 RX ADMIN — BENZONATATE 100 MG: 100 CAPSULE ORAL at 17:53

## 2025-02-26 RX ADMIN — APIXABAN 5 MG: 5 TABLET, FILM COATED ORAL at 09:17

## 2025-02-26 RX ADMIN — APIXABAN 5 MG: 5 TABLET, FILM COATED ORAL at 17:53

## 2025-02-26 RX ADMIN — GUAIFENESIN 600 MG: 600 TABLET, EXTENDED RELEASE ORAL at 09:17

## 2025-02-26 RX ADMIN — SEVELAMER HYDROCHLORIDE 800 MG: 800 TABLET, FILM COATED ORAL at 09:17

## 2025-02-26 RX ADMIN — ASPIRIN 81 MG CHEWABLE TABLET 81 MG: 81 TABLET CHEWABLE at 09:17

## 2025-02-26 RX ADMIN — GABAPENTIN 100 MG: 100 CAPSULE ORAL at 09:17

## 2025-02-26 RX ADMIN — ALLOPURINOL 50 MG: 100 TABLET ORAL at 09:17

## 2025-02-26 RX ADMIN — Medication 2.5 MG: at 06:17

## 2025-02-26 NOTE — PLAN OF CARE
Problem: PAIN - ADULT  Goal: Verbalizes/displays adequate comfort level or baseline comfort level  Description: Interventions:  - Encourage patient to monitor pain and request assistance  - Assess pain using appropriate pain scale  - Administer analgesics based on type and severity of pain and evaluate response  - Implement non-pharmacological measures as appropriate and evaluate response  - Consider cultural and social influences on pain and pain management  - Notify physician/advanced practitioner if interventions unsuccessful or patient reports new pain  Outcome: Progressing     Problem: INFECTION - ADULT  Goal: Absence of fever/infection during neutropenic period  Description: INTERVENTIONS:  - Monitor WBC    Outcome: Progressing     Problem: METABOLIC, FLUID AND ELECTROLYTES - ADULT  Goal: Fluid balance maintained  Description: INTERVENTIONS:  - Monitor labs   - Monitor I/O and WT  - Instruct patient on fluid and nutrition as appropriate  - Assess for signs & symptoms of volume excess or deficit  Outcome: Progressing

## 2025-02-26 NOTE — ASSESSMENT & PLAN NOTE
Influenza A + on 02/19, completed course of Tamiflu   Endorsing generalized weakness and persistent cough due to this   No fever/chills at home   Supportive care   Continue Mucinex, Tessalon perles TID, PRN Annetta DM  PT/OT

## 2025-02-26 NOTE — PLAN OF CARE
Problem: PHYSICAL THERAPY ADULT  Goal: Performs mobility at highest level of function for planned discharge setting.  See evaluation for individualized goals.  Description:            See flowsheet documentation for full assessment, interventions and recommendations.  Outcome: Adequate for Discharge  Note:                Rehab Resource Intensity Level, PT: No post-acute rehabilitation needs    See flowsheet documentation for full assessment.

## 2025-02-26 NOTE — PHYSICAL THERAPY NOTE
"PT EVALUATION       02/26/2025   PT Last Visit   PT Visit Date 02/26/25   Note Type   Note type Evaluation   Pain Assessment   Pain Assessment Tool Farr-Baker FACES   Farr-Baker FACES Pain Rating 6  (Lshoulder area and generalized \"not feeling well with the flu\")   Restrictions/Precautions   LUE Weight Bearing Per Order NWB   Braces or Orthoses Sling  (although patient without sling here in the hospital and patient states that he stopped wearing it at home as per his choice)   Home Living   Type of Home Apartment   Home Layout One level;Stairs to enter with rails  (3 stairs to enter at 1 entrance and 8 to enter at the other)   Home Equipment Walker   Additional Comments Patient reports using walker at times at home, as needed only for longer distances out of home   Prior Function   Level of [place] Independent with ADLs;Independent with functional mobility;Independent with IADLS   Lives With Alone   IADLs Independent with driving;Independent with meal prep;Independent with medication management   Falls in the last 6 months 1 to 4   Comments Most recent fall with fracture of the left greater tuberosity of the shoulder area   General   Additional Pertinent History Chart reviewed, patient admitted with end-stage renal disease.  Patient also flu positive with generalized fatigue and generally ill feeling.  Patient with recent admission and now readmitted due to missing dialysis and with weakness. Patient is now at his baseline level of functional mobility and ambulating independently in his room. Education completed in importance of compliance with L shoulder restrictions for healing   Family/Caregiver Present No   Cognition   Overall Cognitive Status WFL   Arousal/Participation Cooperative   Orientation Level Oriented X4   Following Commands Follows all commands and directions without difficulty   Subjective   Subjective Patient states not feeling well due to the flu   RLE Assessment   RLE Assessment    (Range of " motion within functional limits, strength 4 -/5)   LLE Assessment   LLE Assessment    (Range of motion within functional limits, strength 4 -/5)   Coordination   Movements are Fluid and Coordinated 1   Bed Mobility   Supine to Sit 7  Independent   Sit to Supine 7  Independent   Transfers   Sit to Stand 7  Independent   Stand to Sit 7  Independent   Ambulation/Elevation   Gait Assistance 7  Independent   Assistive Device    (none)   Distance 200 feet + without balance loss in his room with numerous changes in direction.  educated patient in importance of NWB as ordered on previous admission due to shoulder fracture   Stair Management Assistance 7  Independent   Stair Management Technique Step to pattern   Number of Stairs Stair climbing not fully assessed at this time although patient states independence prior to admission and is functioning at his baseline level of mobility   Balance   Static Sitting Good   Dynamic Sitting Fair +   Static Standing Fair +   Dynamic Standing Fair   Ambulatory Fair   Activity Tolerance   Activity Tolerance Patient limited by fatigue, tolerated activity well    Medical Staff Made Aware Yes   Assessment   Prognosis Good   Problem List Orthopedic restrictions;Decreased strength;Decreased endurance;Decreased mobility;Pain   Assessment Patient seen for Physical Therapy evaluation. Patient admitted with ESRD (end stage renal disease) on dialysis ([facility]).  Comorbidities affecting patient's physical performance include: .  Patient presents as independent with gait activity on level and unlevel surfaces, no skilled PT needs.  Personal factors affecting patient at time of initial evaluation include: ambulating with assistive device, inability to perform dynamic tasks in community, and limited home support. Prior to admission, patient was independent with functional mobility with walker at times, independent with ADLS, and independent with IADLS.  Please find objective findings from Physical  Therapy assessment regarding body systems outlined above with impairments and limitations including no new functional deficits.  The Barthel Index was used as a functional outcome tool presenting with a score of Barthel Index Score: 100 today indicating no limitations of functional mobility and ADLS.  Patient's clinical presentation is currently stable  as seen in patient's presentation of no new functional deficits.  As demonstrated by objective findings, the assigned level of complexity for this evaluation is low.The patient's AM-PAC Basic Mobility Inpatient Short Form Raw Score is 24. A Raw score of greater than 16 suggests the patient may benefit from discharge to home. Please also refer to the recommendation of the Physical Therapist for safe discharge planning.   Goals   Patient Goals To feel better   STG Expiration Date    (N/A)   LTG Expiration Date    (N/A)   Discharge Recommendation   Rehab Resource Intensity Level, PT No post-acute rehabilitation needs   AM-PAC Basic Mobility Inpatient   Turning in Flat Bed Without Bedrails 4   Lying on Back to Sitting on Edge of Flat Bed Without Bedrails 4   Moving Bed to Chair 4   Standing Up From Chair Using Arms 4   Walk in Room 4   Climb 3-5 Stairs With Railing 4   Basic Mobility Inpatient Raw Score 24   Basic Mobility Standardized Score 57.68   [facility] Highest Level Of Mobility   -HLM Goal 8: Walk 250 feet or more   JH-HLM Achieved 8: Walk 250 feet or more   Barthel Index   Feeding 10   Bathing 0   Grooming Score 5   Dressing Score 10   Bladder Score 10   Bowels Score 10   Toilet Use Score 10   Transfers (Bed/Chair) Score 15   Mobility (Level Surface) Score 15   Stairs Score 10   Barthel Index Score 95   Licensure   [place] License Number  Anat Lal PT [id number]

## 2025-02-26 NOTE — CASE MANAGEMENT
Case Management Discharge Planning Note    Patient name Naresh Carpio  Location 3 [place] 323/3 [place] 323-* MRN [id number]  : 1960 Date 2025       Current Admission Date: 2025  Current Admission Diagnosis:Generalized weakness   Patient Active Problem List    Diagnosis Date Noted Date Diagnosed    Generalized weakness 2025     Influenza A 2025     Gout 2025     Closed fracture of proximal end of left humerus with routine healing 02/10/2025     Left shoulder pain 2025     ESRD (end stage renal disease) on dialysis ([facility]) 2025     Primary hypertension 2025     Anemia in ESRD (end-stage renal disease)  ([facility]) 2025     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, on chronic dialysis ([facility]) 2025     Renal lesion 2024     Chronic wound 2024     Chronic diastolic (congestive) heart failure ([facility]) 2024     Pulmonary edema 2024     Pleural effusion 2024     Elevated troponin 2024     Fall 2024     Melena 2024     Paroxysmal atrial fibrillation ([facility])      ESRD (end stage renal disease) ([facility]) 10/25/2024     Nausea 10/20/2024     Hyponatremia 10/19/2024     Chronic kidney disease-mineral and bone disorder 10/14/2024     Falls 10/08/2024     PAD (peripheral artery disease) ([facility]) 10/08/2024     Closed fracture of right pelvis ([facility]) 2024     Bilateral sacral fracture, closed ([facility]) 2024     Difficulty with speech 2024     History of amputation of hallux ([facility]) 2024     Hypertensive urgency 2024     Facial cellulitis 2024     Constipation 2023     Cellulitis of right foot      Polymicrobial bacterial infection 2023     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 mellitus, with necrosis of bone ([facility])      Acquired deformity of foot, right      Charcot's joint      Subacute osteomyelitis of right foot ([facility])       Open wound of right foot 08/21/2023     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 mellitus, with bone involvement without evidence of necrosis (HCC)      Diabetic ulcer of left midfoot associated with type 2 diabetes mellitus, limited to breakdown of skin (HCC)      Diabetic polyneuropathy associated with type 2 diabetes mellitus (HCC)      Diabetes mellitus type 2 with peripheral artery disease (HCC)      History of amputation of lesser toe of left foot (HCC)      Onychomycosis      Status post fall 08/19/2023     Traumatic rhabdomyolysis (HCC) 08/19/2023     Leukocytosis 08/19/2023     Osteoarthritis of left hip 07/27/2022     Severe Left Orchalgia 07/26/2022     Right shoulder pain 07/26/2022     Left hip pain 07/06/2022     Hemodialysis status (HCC)      Hypervolemia      Diarrhea 01/22/2022     Anemia due to chronic kidney disease, on chronic dialysis (HCC) 01/21/2022     Type II diabetes mellitus (HCC)      Hypertension      History of TIA (transient ischemic attack)      T10 vertebral fracture ([facility])        LOS (days): 1  Geometric Mean LOS (GMLOS) (days): 3.6  Days to GMLOS:2.6     OBJECTIVE:  Risk of Unplanned Readmission Score: 57.44         Current admission status: Inpatient   Preferred Pharmacy:   [facility] [address] [place] [address]  [address] [place] [address]  Phone: [phone] Fax: [phone]    Primary Care Provider: Jeffrey Jackson    Primary Insurance: MEDICARE  Secondary Insurance:     DISCHARGE DETAILS:    Other Referral/Resources/Interventions Provided:  Referral Comments: RN ELOISE spoke with sister Alicja outside patient's room and voiced concerns about patient being discharged and not going to dialysis. Discussed that SNF placement has been offered to patient several times in the past and patient has been refusing. Alicja stated she has to take care of her grand children and therefore is unable to provide transportation to  patient for dialysis. RN ELOISE spoke with patient along with sister Alicja at bedside to discuss safe discharge plans and again offer change of dialysis center atleast temporarily until patient is able to go back to [facility] again. Patient refused and stated he is not going to have that conversation again. He stated he will go to outpatient HD on Saturday. Alicja then asked patient to call her on days when he feels weak to drive himself to dialysis and promised him that she will take him. CM to request transportation for patient at discharge.     IMM Given (Date):: 02/25/25 (Initial IMM)

## 2025-02-26 NOTE — ASSESSMENT & PLAN NOTE
"Patient presents due to generalized weakness/ambulatory dysfunction in the setting of missed HD sessions since he was discharged from the hospital 02/21/25 after being treated for Influenza A. He states he has not felt well enough to drive 45 minutes to his HD center.   Of note, patient with frequent readmissions due to weakness/fall/fatigue from missed HD sessions due to \"not feeling well\"   Generalized weakness is secondary to recent illness with flu and confounded by persistently missed dialysis sessions  When patient is feeling well, he is able to ambulate with walker and drive without difficulty. Sister and brother in law at bedside stating that during the winter, it is difficult for him to get to dialysis because of the ice. They are trying to push for patient to have a new HD clinic closer to his home, but patient is refusing this.   Patient lives at home alone, and frequently falls. In the past, he was not agreeable to PT/OT or having anyone come to the house.   Family requesting social support/assistance with transportation, maybe visiting aides, because they are not able to care for patient 24/7 or drive him to his HD when he isn't feeling well.   Discussed all options with the patient-patient prefers to keep his hemodialysis at [place]  "

## 2025-02-26 NOTE — PLAN OF CARE
Problem: PAIN - ADULT  Goal: Verbalizes/displays adequate comfort level or baseline comfort level  Description: Interventions:  - Encourage patient to monitor pain and request assistance  - Assess pain using appropriate pain scale  - Administer analgesics based on type and severity of pain and evaluate response  - Implement non-pharmacological measures as appropriate and evaluate response  - Consider cultural and social influences on pain and pain management  - Notify physician/advanced practitioner if interventions unsuccessful or patient reports new pain  Outcome: Progressing     Problem: INFECTION - ADULT  Goal: Absence or prevention of progression during hospitalization  Description: INTERVENTIONS:  - Assess and monitor for signs and symptoms of infection  - Monitor lab/diagnostic results  - Monitor all insertion sites, i.e. indwelling lines, tubes, and drains  - Monitor endotracheal if appropriate and nasal secretions for changes in amount and color  - [place] appropriate cooling/warming therapies per order  - Administer medications as ordered  - Instruct and encourage patient and family to use good hand hygiene technique  - Identify and instruct in appropriate isolation precautions for identified infection/condition  Outcome: Progressing

## 2025-02-26 NOTE — ASSESSMENT & PLAN NOTE
Lab Results   Component Value Date    HGBA1C 5.7 (H) 02/09/2025       Recent Labs     02/25/25 2038 02/26/25  0739 02/26/25  1120 02/26/25  1551   POCGLU 108 98 274* 104       Blood Sugar Average: Last 72 hrs:  (P) 131.25  Well controlled with diet   SSI with accuchecks   Avoid hypoglycemia   Blood sugars are acceptable

## 2025-02-26 NOTE — ASSESSMENT & PLAN NOTE
Wt Readings from Last 3 Encounters:   02/26/25 103 kg (226 lb 10.1 oz)   02/18/25 107 kg (235 lb)   02/11/25 108 kg (237 lb)     Not in acute exacerbation   Volume management with HD   I/O, Daily weights

## 2025-02-26 NOTE — TREATMENT PLAN
Nephrology    Underwent hemodialysis yesterday February 25    Overall stable from his standpoint for discharge    Discussed with the primary team    Patient remains inpatient tomorrow plan for dialysis tomorrow.

## 2025-02-26 NOTE — PROGRESS NOTES
"Progress Note - Hospitalist   Name: Naresh Carpio 64 y.o. male I MRN: [id number]  Unit/Bed#: [address] [place], [place] [address] I Date of Admission: 2/24/2025   Date of Service: 2/26/2025 I Hospital Day: 1    Assessment & Plan  Generalized weakness  Patient presents due to generalized weakness/ambulatory dysfunction in the setting of missed HD sessions since he was discharged from the hospital 02/21/25 after being treated for Influenza A. He states he has not felt well enough to drive 45 minutes to his HD center.   Of note, patient with frequent readmissions due to weakness/fall/fatigue from missed HD sessions due to \"not feeling well\"   Generalized weakness is secondary to recent illness with flu and confounded by persistently missed dialysis sessions  When patient is feeling well, he is able to ambulate with walker and drive without difficulty. Sister and brother in law at bedside stating that during the winter, it is difficult for him to get to dialysis because of the ice. They are trying to push for patient to have a new HD clinic closer to his home, but patient is refusing this.   Patient lives at home alone, and frequently falls. In the past, he was not agreeable to PT/OT or having anyone come to the house.   Family requesting social support/assistance with transportation, maybe visiting aides, because they are not able to care for patient 24/7 or drive him to his HD when he isn't feeling well.   Discussed all options with the patient-patient prefers to keep his hemodialysis at [facility]  ESRD (end stage renal disease) on dialysis (HCC)  Lab Results   Component Value Date    EGFR 4 02/25/2025    EGFR 4 02/24/2025    EGFR 7 02/21/2025    CREATININE 11.06 (H) 02/25/2025    CREATININE 10.76 (H) 02/24/2025    CREATININE 6.83 (H) 02/21/2025     ESRD on HD T/Th/Sat. Missed HD x 2 since he was discharged from the hospital on 02/21 due to \"feeling sick from the flu\".   Patient is not agreeable to switch HD clinic to a closer " facility, and family are not able to drive him to dialysis all the time.   Nephrology consulted for HD   Continue renal diet  Patient received hemodialysis yesterday.  Patient to be scheduled for next session tomorrow    Influenza A  Influenza A + on 02/19, completed course of Tamiflu   Endorsing generalized weakness and persistent cough due to this   No fever/chills at home   Supportive care   Continue Mucinex, Tessalon perles TID, PRN Robitussin DM  PT/OT  Chronic diastolic (congestive) heart failure (HCC)  Wt Readings from Last 3 Encounters:   02/26/25 103 kg (226 lb 10.1 oz)   02/18/25 107 kg (235 lb)   02/11/25 108 kg (237 lb)     Not in acute exacerbation   Volume management with HD   I/O, Daily weights          Type II diabetes mellitus (HCC)  Lab Results   Component Value Date    HGBA1C 5.7 (H) 02/09/2025       Recent Labs     02/25/25  2038 02/26/25  0739 02/26/25  1120 02/26/25  1551   POCGLU 108 98 274* 104       Blood Sugar Average: Last 72 hrs:  (P) 131.25  Well controlled with diet   SSI with accuchecks   Avoid hypoglycemia   Blood sugars are acceptable    Hypertension  Continue Nifedipine   Hyponatremia  Chronic hyponatremia 2/2 ESRD   Management with HD  Paroxysmal atrial fibrillation (HCC)  Rate controlled   Continue Eliquis, Nifedipine  Anemia in ESRD (end-stage renal disease)  (HCC)  Hgb stable at baseline 8-9  Not on SURINDER as outpatient   hemoglobin below target but stable    VTE Pharmacologic Prophylaxis: VTE Score: 4 Moderate Risk (Score 3-4) - Pharmacological DVT Prophylaxis Ordered: apixaban (Eliquis).    Mobility:   Basic Mobility Inpatient Raw Score: 23  JH-HLM Goal: 7: Walk 25 feet or more  JH-HLM Achieved: 7: Walk 25 feet or more  JH-HLM Goal achieved. Continue to encourage appropriate mobility.    Patient Centered Rounds: I performed bedside rounds with nursing staff today.   Discussions with Specialists or Other Care Team Provider: Nephrology    Education and Discussions with Family /  Patient: yes    Current Length of Stay: 1 day(s)  Current Patient Status: Inpatient   Certification Statement: The patient will continue to require additional inpatient hospital stay due to ESRD requiring hemodialysis, weakness  Discharge Plan: Anticipate discharge tomorrow to home.    Code Status: Level 1 - Full Code    Subjective   Patient is feeling slightly better.  Improved cough and weakness.  Denies any chest pain or abdominal pain    Objective :  Temp:  [36.6 °F (2.6 °C)-98.6 °F (37 °C)] 98.6 °F (37 °C)  HR:  [68-71] 71  BP: (132-155)/(62-72) 132/62  Resp:  [18-20] 20  SpO2:  [95 %-96 %] 96 %  O2 Device: None (Room air)    Body mass index is 29.9 kg/m².     Input and Output Summary (last 24 hours):     Intake/Output Summary (Last 24 hours) at 2/26/2025 1650  Last data filed at 2/26/2025 1552  Gross per 24 hour   Intake 500 ml   Output 550 ml   Net -50 ml       Physical Exam  Constitutional:       Appearance: Normal appearance.   HENT:      Head: Normocephalic and atraumatic.   Eyes:      Extraocular Movements: Extraocular movements intact.      Pupils: Pupils are equal, round, and reactive to light.   Cardiovascular:      Rate and Rhythm: Normal rate and regular rhythm.      Heart sounds: No murmur heard.     No gallop.   Pulmonary:      Effort: Pulmonary effort is normal.      Breath sounds: Normal breath sounds.   Abdominal:      General: Bowel sounds are normal.      Palpations: Abdomen is soft.      Tenderness: There is no abdominal tenderness.   Musculoskeletal:         General: No swelling or deformity. Normal range of motion.      Cervical back: Normal range of motion and neck supple.   Skin:     General: Skin is warm and dry.   Neurological:      General: No focal deficit present.      Mental Status: He is alert.           Lines/Drains:  Lines/Drains/Airways       Active Status       Name Placement date Placement time Site Days    HD Permanent Double Catheter 08/30/23  --  Internal jugular  546                             Lab Results: I have reviewed the following results:   Results from last 7 days   Lab Units 02/25/25  0519   WBC Thousand/uL 8.31   HEMOGLOBIN g/dL 8.6*   HEMATOCRIT % 26.5*   PLATELETS Thousands/uL 241   SEGS PCT % 64   LYMPHO PCT % 19   MONO PCT % 11   EOS PCT % 6     Results from last 7 days   Lab Units 02/25/25  0519 02/24/25  1739   SODIUM mmol/L 130* 129*   POTASSIUM mmol/L 4.7 4.7   CHLORIDE mmol/L 92* 92*   CO2 mmol/L 19* 21   BUN mg/dL 86* 83*   CREATININE mg/dL 11.06* 10.76*   ANION GAP mmol/L 19* 16*   CALCIUM mg/dL 8.0* 7.8*   ALBUMIN g/dL  --  3.8   TOTAL BILIRUBIN mg/dL  --  0.30   ALK PHOS U/L  --  79   ALT U/L  --  12   AST U/L  --  10*   GLUCOSE RANDOM mg/dL 86 122         Results from last 7 days   Lab Units 02/26/25  1551 02/26/25  1120 02/26/25  0739 02/25/25  2038 02/25/25  1614 02/25/25  1144 02/25/25  0747 02/24/25  2213 02/21/25  1145 02/21/25  0812 02/20/25  2049 02/20/25  1647   POC GLUCOSE mg/dl 104 274* 98 108 162* 104 108 92 117 79 97 108               Recent Cultures (last 7 days):         Imaging Results Review: I reviewed radiology reports from this admission including: chest xray.      Last 24 Hours Medication List:     Current Facility-Administered Medications:     acetaminophen (TYLENOL) tablet 650 mg, Q6H PRN    allopurinol (ZYLOPRIM) tablet 50 mg, Daily    apixaban (ELIQUIS) tablet 5 mg, BID    aspirin chewable tablet 81 mg, Daily    atorvastatin (LIPITOR) tablet 80 mg, Daily With Dinner    benzonatate (TESSALON PERLES) capsule 100 mg, TID    dextromethorphan-guaiFENesin (ROBITUSSIN DM) oral syrup 10 mL, Q4H PRN    gabapentin (NEURONTIN) capsule 100 mg, BID    guaiFENesin (MUCINEX) 12 hr tablet 600 mg, Q12H KEMAL    insulin lispro (HumALOG/ADMELOG) 100 units/mL subcutaneous injection 1-6 Units, TID AC **AND** Fingerstick Glucose (POCT), TID AC    insulin lispro (HumALOG/ADMELOG) 100 units/mL subcutaneous injection 1-6 Units, HS    NIFEdipine (PROCARDIA XL) 24  hr tablet 30 mg, After Dinner    oxyCODONE (ROXICODONE) split tablet 2.5 mg, Q8H PRN    sevelamer (RENAGEL) tablet 800 mg, TID With Meals    Administrative Statements   Today, Patient Was Seen By: Joel Dalton MD      **Please Note: This note may have been constructed using a voice recognition system.**

## 2025-02-26 NOTE — ASSESSMENT & PLAN NOTE
"Lab Results   Component Value Date    EGFR 4 02/25/2025    EGFR 4 02/24/2025    EGFR 7 02/21/2025    CREATININE 11.06 (H) 02/25/2025    CREATININE 10.76 (H) 02/24/2025    CREATININE 6.83 (H) 02/21/2025     ESRD on HD T/Th/Sat. Missed HD x 2 since he was discharged from the hospital on 02/21 due to \"feeling sick from the flu\".   Patient is not agreeable to switch HD clinic to a closer facility, and family are not able to drive him to dialysis all the time.   Nephrology consulted for HD   Continue renal diet  Patient received hemodialysis yesterday.  Patient to be scheduled for next session tomorrow    "

## 2025-02-26 NOTE — CASE MANAGEMENT
Case Management Assessment & Discharge Planning Note    Patient name Naresh Carpio  Location [address] [facility] MRN [id number]  : 1960 Date 2025       Current Admission Date: 2025  Current Admission Diagnosis:Generalized weakness   Patient Active Problem List    Diagnosis Date Noted Date Diagnosed    Generalized weakness 2025     Influenza A 2025     Gout 2025     Closed fracture of proximal end of left humerus with routine healing 02/10/2025     Left shoulder pain 2025     ESRD (end stage renal disease) on dialysis ([facility]) 2025     Primary hypertension 2025     Anemia in ESRD (end-stage renal disease)  ([facility]) 2025     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, on chronic dialysis ([facility]) 2025     Renal lesion 2024     Chronic wound 2024     Chronic diastolic (congestive) heart failure ([facility]) 2024     Pulmonary edema 2024     Pleural effusion 2024     Elevated troponin 2024     Fall 2024     Melena 2024     Paroxysmal atrial fibrillation ([facility])      ESRD (end stage renal disease) ([facility]) 10/25/2024     Nausea 10/20/2024     Hyponatremia 10/19/2024     Chronic kidney disease-mineral and bone disorder 10/14/2024     Falls 10/08/2024     PAD (peripheral artery disease) ([facility]) 10/08/2024     Closed fracture of right pelvis ([facility]) 2024     Bilateral sacral fracture, closed ([facility]) 2024     Difficulty with speech 2024     History of amputation of hallux ([facility]) 2024     Hypertensive urgency 2024     Facial cellulitis 2024     Constipation 2023     Cellulitis of right foot      Polymicrobial bacterial infection 2023     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 mellitus, with necrosis of bone ([facility])      Acquired deformity of foot, right      Charcot's joint      Subacute osteomyelitis of right  foot (HCC)      Open wound of right foot 08/21/2023     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 mellitus, with bone involvement without evidence of necrosis (HCC)      Diabetic ulcer of left midfoot associated with type 2 diabetes mellitus, limited to breakdown of skin (HCC)      Diabetic polyneuropathy associated with type 2 diabetes mellitus (HCC)      Diabetes mellitus type 2 with peripheral artery disease (HCC)      History of amputation of lesser toe of left foot (HCC)      Onychomycosis      Status post fall 08/19/2023     Traumatic rhabdomyolysis (HCC) 08/19/2023     Leukocytosis 08/19/2023     Osteoarthritis of left hip 07/27/2022     Severe Left Orchalgia 07/26/2022     Right shoulder pain 07/26/2022     Left hip pain 07/06/2022     Hemodialysis status ([facility])      Hypervolemia      Diarrhea 01/22/2022     Anemia due to chronic kidney disease, on chronic dialysis (HCC) 01/21/2022     Type II diabetes mellitus (HCC)      Hypertension      History of TIA (transient ischemic attack)      T10 vertebral fracture ([facility])        LOS (days): 1  Geometric Mean LOS (GMLOS) (days):   Days to GMLOS:     OBJECTIVE:  PATIENT READMITTED TO HOSPITAL  Risk of Unplanned Readmission Score: 58.16         Current admission status: Inpatient  Referral Reason:  (High Utilizer)    Preferred Pharmacy:   [facility] [address] [place] [address] [address] [place] [address] 206  [address] [address]  Phone: [phone] Fax: [phone]    Primary Care Provider: Jeffrey Jackson    Primary Insurance: MEDICARE  Secondary Insurance:     ASSESSMENT:  Active Health Care Proxies       Alicja Carpio Health Care Representative - Sister   Primary Phone: [phone] (Mobile)                    Patient Information  Admitted from:: Home  Mental Status: Alert  During Assessment patient was accompanied by: Not accompanied during assessment  Assessment information provided by:: Patient, Sister  Primary Caregiver:  Self  Support Systems: Family members  County of Residence: [place]  Home entry access options. Select all that apply.: Stairs  Number of steps to enter home.: One Flight  Do the steps have railings?: Yes  Type of Current Residence: Apartment  Floor Level: 2  Upon entering residence, is there a bedroom on the main floor (no further steps)?: Yes  Upon entering residence, is there a bathroom on the main floor (no further steps)?: Yes  Living Arrangements: Lives Alone    Activities of Daily Living Prior to Admission  Functional Status: Independent    DISCHARGE DETAILS:    Discharge planning discussed with:: Sister over phone  Freedom of Choice: Yes  Comments - Freedom of Choice: Sister requested patient get his HD session tomm in the hopsital prior discharge  CM contacted family/caregiver?: Yes  Were Treatment Team discharge recommendations reviewed with patient/caregiver?: Yes  Did patient/caregiver verbalize understanding of patient care needs?: Yes  Were patient/caregiver advised of the risks associated with not following Treatment Team discharge recommendations?: Yes    Contacts  Patient Contacts: Alicja Carpio ()  Relationship to Patient:: Family  Contact Method: Phone, In Person  Phone Number: [phone]  Reason/Outcome: Emergency Contact    Requested Home Health Care         Is the patient interested in HHC at discharge?: No     Other Referral/Resources/Interventions Provided:  Referral Comments: RN CM called and spoke with sister Alicja over phone and stated she geetha be visiting hospital in an hour or 2. Alicja requested patient stay in hospital to get his dialysis session tomm Thurs 2/27 as he still continues to feel weak. CURLY NAVAS informed Alicja that CURLY NAVAS will inform physician about the request and meet with her in patient's room when she arrives. Alicja agreeable.     Treatment Team Recommendation: Home  Discharge Destination Plan:: Home  Transport at Discharge : Ride Share

## 2025-02-27 ENCOUNTER — APPOINTMENT (INPATIENT)
Dept: DIALYSIS | Facility: HOSPITAL | Age: 65
End: 2025-02-27
Attending: INTERNAL MEDICINE
Payer: MEDICARE

## 2025-02-27 VITALS
HEART RATE: 79 BPM | TEMPERATURE: 97.9 F | HEIGHT: 73 IN | RESPIRATION RATE: 18 BRPM | BODY MASS INDEX: 29.8 KG/M2 | DIASTOLIC BLOOD PRESSURE: 71 MMHG | SYSTOLIC BLOOD PRESSURE: 145 MMHG | OXYGEN SATURATION: 97 % | WEIGHT: 224.87 LBS

## 2025-02-27 PROBLEM — J10.1 INFLUENZA A: Status: RESOLVED | Noted: 2025-02-20 | Resolved: 2025-02-27

## 2025-02-27 LAB
ANION GAP SERPL CALCULATED.3IONS-SCNC: 17 MMOL/L (ref 4–13)
BUN SERPL-MCNC: 63 MG/DL (ref 5–25)
CALCIUM SERPL-MCNC: 8.1 MG/DL (ref 8.4–10.2)
CHLORIDE SERPL-SCNC: 94 MMOL/L (ref 96–108)
CO2 SERPL-SCNC: 21 MMOL/L (ref 21–32)
CREAT SERPL-MCNC: 8.99 MG/DL (ref 0.6–1.3)
GFR SERPL CREATININE-BSD FRML MDRD: 5 ML/MIN/1.73SQ M
GLUCOSE SERPL-MCNC: 109 MG/DL (ref 65–140)
GLUCOSE SERPL-MCNC: 150 MG/DL (ref 65–140)
GLUCOSE SERPL-MCNC: 97 MG/DL (ref 65–140)
POTASSIUM SERPL-SCNC: 4.9 MMOL/L (ref 3.5–5.3)
SODIUM SERPL-SCNC: 132 MMOL/L (ref 135–147)

## 2025-02-27 PROCEDURE — 82948 REAGENT STRIP/BLOOD GLUCOSE: CPT

## 2025-02-27 PROCEDURE — 99239 HOSP IP/OBS DSCHRG MGMT >30: CPT | Performed by: INTERNAL MEDICINE

## 2025-02-27 PROCEDURE — 5A1D70Z PERFORMANCE OF URINARY FILTRATION, INTERMITTENT, LESS THAN 6 HOURS PER DAY: ICD-10-PCS | Performed by: INTERNAL MEDICINE

## 2025-02-27 PROCEDURE — 90935 HEMODIALYSIS ONE EVALUATION: CPT | Performed by: INTERNAL MEDICINE

## 2025-02-27 PROCEDURE — 80048 BASIC METABOLIC PNL TOTAL CA: CPT | Performed by: INTERNAL MEDICINE

## 2025-02-27 RX ORDER — GUAIFENESIN AND DEXTROMETHORPHAN HYDROBROMIDE 10; 100 MG/5ML; MG/5ML
10 SYRUP ORAL EVERY 4 HOURS PRN
Qty: 118 ML | Refills: 0 | Status: ON HOLD | OUTPATIENT
Start: 2025-02-27

## 2025-02-27 RX ADMIN — ASPIRIN 81 MG CHEWABLE TABLET 81 MG: 81 TABLET CHEWABLE at 12:50

## 2025-02-27 RX ADMIN — SEVELAMER HYDROCHLORIDE 800 MG: 800 TABLET, FILM COATED ORAL at 12:50

## 2025-02-27 RX ADMIN — APIXABAN 5 MG: 5 TABLET, FILM COATED ORAL at 12:50

## 2025-02-27 RX ADMIN — INSULIN LISPRO 1 UNITS: 100 INJECTION, SOLUTION INTRAVENOUS; SUBCUTANEOUS at 12:51

## 2025-02-27 RX ADMIN — SEVELAMER HYDROCHLORIDE 800 MG: 800 TABLET, FILM COATED ORAL at 09:00

## 2025-02-27 RX ADMIN — Medication 2.5 MG: at 05:45

## 2025-02-27 RX ADMIN — GABAPENTIN 100 MG: 100 CAPSULE ORAL at 08:55

## 2025-02-27 RX ADMIN — ALLOPURINOL 50 MG: 100 TABLET ORAL at 12:50

## 2025-02-27 RX ADMIN — BENZONATATE 100 MG: 100 CAPSULE ORAL at 12:50

## 2025-02-27 RX ADMIN — GUAIFENESIN 600 MG: 600 TABLET, EXTENDED RELEASE ORAL at 12:50

## 2025-02-27 NOTE — PROCEDURES
NEPHROLOGY HOSPITAL PROGRESS NOTE   Naresh Carpio 64 y.o. male MRN: [id number]  Unit/Bed#: [address] [place] [address] Encounter: [id number]  Reason for Consult: Dialysis management    Brief History of Admission - 64-year-old male who was recently discharged from the hospital on February 21 returns for generalized weakness and diagnosed with influenza A. Nephrology counts for ESRD management.     NEPHROLOGY DIALYSIS PROCEDURE NOTE      Patient seen and examined on hemodialysis, tolerating procedure well. All documentation, labs, medications were reviewed by myself, and the treatment plan was reviewed with nurse and patient.     Seen on Dialysis at : Hemodialysis at 9:30 AM  Dialysis Access: Right IJ tunneled PermCath  Vitals: 139/65, preweight 102 EKG  Dialysis time: 3.5 hours  Dialyzer: F180  Sodium bath: 138  Potassium bath: 2 K+  Bicarbonate bath: 35  Calcium bath: 2.5  Ultrafiltration: 3-3.5  Blood flow rate: 400 cc/min  Dialysis flow rate: 1.5 X Qb  Dialysis temperature: 36C  Medications given on HD: none    Assessment & Plan  Generalized weakness  -- Likely secondary to viral syndrome as diagnosed with influenza A.  -- Improving  Type II diabetes mellitus (HCC)  Lab Results   Component Value Date    HGBA1C 5.7 (H) 02/09/2025   -- Well-controlled hemoglobin A1c 5.7    Recent Labs     02/26/25  1120 02/26/25  1551 02/26/25  2110 02/27/25  0719   POCGLU 274* 104 142* 109       Blood Sugar Average: Last 72 hrs:  (P) 130.1    Hypertension  -- Blood pressure controlled monitor with ultrafiltration.  Hyponatremia  -- Sodium level stable at 132  --Continue ultrafiltration on HD  --Fluid restriction  Paroxysmal atrial fibrillation (HCC)  -- Management as per the primary team  Chronic diastolic (congestive) heart failure (HCC)  Wt Readings from Last 3 Encounters:   02/27/25 102 kg (224 lb 13.9 oz)   02/18/25 107 kg (235 lb)   02/11/25 108 kg (237 lb)   -- Ultrafiltration on dialysis.    ESRD (end stage renal disease) on dialysis  ([facility])  Lab Results   Component Value Date    EGFR 5 02/27/2025    EGFR 4 02/25/2025    EGFR 4 02/24/2025    CREATININE 8.99 (H) 02/27/2025    CREATININE 11.06 (H) 02/25/2025    CREATININE 10.76 (H) 02/24/2025   -- In center hemodialysis Tuesday Thursday Saturday at [facility]  --Access: Right IJ permacath  --Last outpatient dialysis was on Thursday, February 20th, he missed HD on Saturday due to not feeling well  --EDW 99 kg, admitted with a weight of 103 kg, UF may be limited due to blood pressure.  -- Seen on dialysis tolerating procedure well  --Stable from renal standpoint for discharge  Anemia in ESRD (end-stage renal disease)  ([facility])  Lab Results   Component Value Date    EGFR 5 02/27/2025    EGFR 4 02/25/2025    EGFR 4 02/24/2025    CREATININE 8.99 (H) 02/27/2025    CREATININE 11.06 (H) 02/25/2025    CREATININE 10.76 (H) 02/24/2025   -- Not on SURINDER as an outpatient  --Hemoglobin below target but stable  Influenza A  -- Management as per the primary team      I have reviewed the nephrology recommendations including assessment and plan, with patient and HD nurse, and we are in agreement with renal plan including the information outlined above.    SUBJECTIVE / 24H INTERVAL HISTORY:  Planing of shoulder pain.  Otherwise no issues with dialysis.    OBJECTIVE:  Current Weight: Weight - Scale: 102 kg (224 lb 13.9 oz)  Vitals:    02/27/25 0740 02/27/25 0813 02/27/25 0830 02/27/25 0900   BP: 136/62 122/63 135/70 139/65   BP Location: Left arm      Pulse: 70 70 72 75   Resp: 18 18 18 18   Temp: 98.1 °F (36.7 °C)      TempSrc: Tympanic      SpO2:       Weight:       Height:           Intake/Output Summary (Last 24 hours) at 2/27/2025 0939  Last data filed at 2/27/2025 0813  Gross per 24 hour   Intake 700 ml   Output 525 ml   Net 175 ml     General: Awake, no acute distress  HEENT: Pink and moist mucous membranes  Neck: Supple  Skin: No rashes  Cardiovascular: S1-S2 appreciated regular rate and rhythm  Lungs:  Clear  Abdomen: Nontender nondistended  Extremities: Trace edema  Neurologic: No asterixis  Access: Right IJ permacath with no erythema or exudate.    Medications:    Current Facility-Administered Medications:     acetaminophen (TYLENOL) tablet 650 mg, 650 mg, Oral, Q6H PRN, Niharika Russell PA-C, 650 mg at 02/26/25 1254    allopurinol (ZYLOPRIM) tablet 50 mg, 50 mg, Oral, Daily, Niharika Russell PA-C, 50 mg at 02/26/25 0917    apixaban (ELIQUIS) tablet 5 mg, 5 mg, Oral, BID, Niharika Russell PA-C, 5 mg at 02/26/25 1753    aspirin chewable tablet 81 mg, 81 mg, Oral, Daily, Niharika Russell PA-C, 81 mg at 02/26/25 0917    atorvastatin (LIPITOR) tablet 80 mg, 80 mg, Oral, Daily With Dinner, Niharika Russell PA-C, 80 mg at 02/26/25 1752    benzonatate (TESSALON PERLES) capsule 100 mg, 100 mg, Oral, TID, Niharika Russell PA-C, 100 mg at 02/26/25 2114    dextromethorphan-guaiFENesin (ROBITUSSIN DM) oral syrup 10 mL, 10 mL, Oral, Q4H PRN, Niharika Russell PA-C, 10 mL at 02/24/25 2236    gabapentin (NEURONTIN) capsule 100 mg, 100 mg, Oral, BID, Niharika Russell PA-C, 100 mg at 02/27/25 0855    guaiFENesin (MUCINEX) 12 hr tablet 600 mg, 600 mg, Oral, Q12H KEMAL, Niharika Russell PA-C, 600 mg at 02/26/25 2114    insulin lispro (HumALOG/ADMELOG) 100 units/mL subcutaneous injection 1-6 Units, 1-6 Units, Subcutaneous, TID AC, 4 Units at 02/26/25 1146 **AND** Fingerstick Glucose (POCT), , , TID AC, Niharika Russell PA-C    insulin lispro (HumALOG/ADMELOG) 100 units/mL subcutaneous injection 1-6 Units, 1-6 Units, Subcutaneous, HS, Niharika Russell PA-C    NIFEdipine (PROCARDIA XL) 24 hr tablet 30 mg, 30 mg, Oral, After Dinner, Niharika Russell PA-C, 30 mg at 02/26/25 1753    oxyCODONE (ROXICODONE) split tablet 2.5 mg, 2.5 mg, Oral, Q8H PRN, Niharika Russell PA-C, 2.5 mg at 02/27/25 0545    sevelamer (RENAGEL) tablet 800 mg, 800 mg, Oral, TID With Meals, Niharika Russell PA-C,  "800 mg at 02/27/25 0900    Laboratory Results:  Results from last 7 days   Lab Units 02/27/25  0504 02/25/25  0519 02/24/25  1739 02/21/25  0521 02/20/25  1252   WBC Thousand/uL  --  8.31 9.15 5.17 6.11   HEMOGLOBIN g/dL  --  8.6* 8.6* 8.5* 9.2*   HEMATOCRIT %  --  26.5* 27.0* 26.0* 27.3*   PLATELETS Thousands/uL  --  241 211 175 190   POTASSIUM mmol/L 4.9 4.7 4.7 4.0 4.7   CHLORIDE mmol/L 94* 92* 92* 92* 89*   CO2 mmol/L 21 19* 21 23 22   BUN mg/dL 63* 86* 83* 41* 83*   CREATININE mg/dL 8.99* 11.06* 10.76* 6.83* 10.78*   CALCIUM mg/dL 8.1* 8.0* 7.8* 7.6* 7.2*   MAGNESIUM mg/dL  --  2.2  --   --   --    PHOSPHORUS mg/dL  --  7.4*  --   --   --        Portions of the record may have been created with voice recognition software. Occasional wrong word or \"sound a like\" substitutions may have occurred due to the inherent limitations of voice recognition software. Read the chart carefully and recognize, using context, where substitutions have occurred.If you have any questions, please contact the dictating provider.    "

## 2025-02-27 NOTE — NURSING NOTE
Pt alert and oriented x 4. Pt IV removed with no IV related complications. Pt escorted off the unit and out of building by staff. Pt left build in lyft transport. AVS given and no further questions asked.

## 2025-02-27 NOTE — ASSESSMENT & PLAN NOTE
General: Lab Results   Component Value Date    HGBA1C 5.7 (H) 02/09/2025   -- Well-controlled hemoglobin A1c 5.7    Recent Labs     02/26/25  1120 02/26/25  1551 02/26/25  2110 02/27/25  0719   POCGLU 274* 104 142* 109       Blood Sugar Average: Last 72 hrs:  (P) 130.1

## 2025-02-27 NOTE — PLAN OF CARE
Target UF Goal 3 L as tolerated. Patient dialyzing for  3.5 hours  on 2 K bath for serum K of  4.9  per protocol. Treatment plan reviewed with Dr. Bazan.   Problem: METABOLIC, FLUID AND ELECTROLYTES - ADULT  Goal: Electrolytes maintained within normal limits  Description: INTERVENTIONS:  - Monitor labs and assess patient for signs and symptoms of electrolyte imbalances  - Administer electrolyte replacement as ordered  - Monitor response to electrolyte replacements, including repeat lab results as appropriate  - Instruct patient on fluid and nutrition as appropriate  Outcome: Progressing  Goal: Fluid balance maintained  Description: INTERVENTIONS:  - Monitor labs   - Monitor I/O and WT  - Instruct patient on fluid and nutrition as appropriate  - Assess for signs & symptoms of volume excess or deficit  Outcome: Progressing   Post-Dialysis RN Treatment Note    Blood Pressure:  Pre 122/63 mm/Hg  Post 145/71 mmHg   EDW:  99 kg    Weight:  Pre 102 kg   Post 99 kg   Mode of weight measurement: Standing Scale   Volume Removed:  3000 ml    Treatment duration: 210 minutes    NS given:  No    Treatment shortened No   Medications given during Rx: Gabapentin 100 mg,   Sevelamir 800 mg   Estimated Kt/V:  None Reported   Access type: Permacath/TDC   Needle Gauge:  na   Access Issues: No    Verbal report  to primary nurse:   Yes Aman Chester RN

## 2025-02-27 NOTE — PLAN OF CARE
Problem: PAIN - ADULT  Goal: Verbalizes/displays adequate comfort level or baseline comfort level  Description: Interventions:  - Encourage patient to monitor pain and request assistance  - Assess pain using appropriate pain scale  - Administer analgesics based on type and severity of pain and evaluate response  - Implement non-pharmacological measures as appropriate and evaluate response  - Consider cultural and social influences on pain and pain management  - Notify physician/advanced practitioner if interventions unsuccessful or patient reports new pain  Outcome: Progressing     Problem: INFECTION - ADULT  Goal: Absence or prevention of progression during hospitalization  Description: INTERVENTIONS:  - Assess and monitor for signs and symptoms of infection  - Monitor lab/diagnostic results  - Monitor all insertion sites, i.e. indwelling lines, tubes, and drains  - Monitor endotracheal if appropriate and nasal secretions for changes in amount and color  - [place] appropriate cooling/warming therapies per order  - Administer medications as ordered  - Instruct and encourage patient and family to use good hand hygiene technique  - Identify and instruct in appropriate isolation precautions for identified infection/condition  Outcome: Progressing  Goal: Absence of fever/infection during neutropenic period  Description: INTERVENTIONS:  - Monitor WBC    Outcome: Progressing     Problem: SAFETY ADULT  Goal: Patient will remain free of falls  Description: INTERVENTIONS:  - Educate patient/family on patient safety including physical limitations  - Instruct patient to call for assistance with activity   - Consult OT/PT to assist with strengthening/mobility   - Keep Call bell within reach  - Keep bed low and locked with side rails adjusted as appropriate  - Keep care items and personal belongings within reach  - Initiate and maintain comfort rounds  - Make Fall Risk Sign visible to staff  - Offer Toileting every 2 Hours,  in advance of need  - Initiate/Maintain bed alarm  - Apply yellow socks and bracelet for high fall risk patients  - Consider moving patient to room near nurses station  Outcome: Progressing  Goal: Maintain or return to baseline ADL function  Description: INTERVENTIONS:  -  Assess patient's ability to carry out ADLs; assess patient's baseline for ADL function and identify physical deficits which impact ability to perform ADLs (bathing, care of mouth/teeth, toileting, grooming, dressing, etc.)  - Assess/evaluate cause of self-care deficits   - Assess range of motion  - Assess patient's mobility; develop plan if impaired  - Assess patient's need for assistive devices and provide as appropriate  - Encourage maximum independence but intervene and supervise when necessary  - Involve family in performance of ADLs  - Assess for home care needs following discharge   - Consider OT consult to assist with ADL evaluation and planning for discharge  - Provide patient education as appropriate  Outcome: Progressing  Goal: Maintains/Returns to pre admission functional level  Description: INTERVENTIONS:  - Perform AM-PAC 6 Click Basic Mobility/ Daily Activity assessment daily.  - Set and communicate daily mobility goal to care team and patient/family/caregiver.   - Collaborate with rehabilitation services on mobility goals if consulted  - Perform Range of Motion 2 times a day.  - Reposition patient every 2 hours.  - Dangle patient 2 times a day  - Stand patient 3 times a day  - Ambulate patient 3 times a day  - Out of bed to chair 3 times a day   - Out of bed for meals 3 times a day  - Out of bed for toileting  - Record patient progress and toleration of activity level   Outcome: Progressing     Problem: DISCHARGE PLANNING  Goal: Discharge to home or other facility with appropriate resources  Description: INTERVENTIONS:  - Identify barriers to discharge w/patient and caregiver  - Arrange for needed discharge resources and  transportation as appropriate  - Identify discharge learning needs (meds, wound care, etc.)  - Arrange for interpretive services to assist at discharge as needed  - Refer to Case Management Department for coordinating discharge planning if the patient needs post-hospital services based on physician/advanced practitioner order or complex needs related to functional status, cognitive ability, or social support system  Outcome: Progressing     Problem: Knowledge Deficit  Goal: Patient/family/caregiver demonstrates understanding of disease process, treatment plan, medications, and discharge instructions  Description: Complete learning assessment and assess knowledge base.  Interventions:  - Provide teaching at level of understanding  - Provide teaching via preferred learning methods  Outcome: Progressing     Problem: MOBILITY - ADULT  Goal: Maintain or return to baseline ADL function  Description: INTERVENTIONS:  -  Assess patient's ability to carry out ADLs; assess patient's baseline for ADL function and identify physical deficits which impact ability to perform ADLs (bathing, care of mouth/teeth, toileting, grooming, dressing, etc.)  - Assess/evaluate cause of self-care deficits   - Assess range of motion  - Assess patient's mobility; develop plan if impaired  - Assess patient's need for assistive devices and provide as appropriate  - Encourage maximum independence but intervene and supervise when necessary  - Involve family in performance of ADLs  - Assess for home care needs following discharge   - Consider OT consult to assist with ADL evaluation and planning for discharge  - Provide patient education as appropriate  Outcome: Progressing  Goal: Maintains/Returns to pre admission functional level  Description: INTERVENTIONS:  - Perform AM-PAC 6 Click Basic Mobility/ Daily Activity assessment daily.  - Set and communicate daily mobility goal to care team and patient/family/caregiver.   - Collaborate with  rehabilitation services on mobility goals if consulted  - Perform Range of Motion 2 times a day.  - Reposition patient every 2 hours.  - Dangle patient 3 times a day  - Stand patient 3 times a day  - Ambulate patient 3 times a day  - Out of bed to chair 3 times a day   - Out of bed for meals 3 times a day  - Out of bed for toileting  - Record patient progress and toleration of activity level   Outcome: Progressing     Problem: METABOLIC, FLUID AND ELECTROLYTES - ADULT  Goal: Electrolytes maintained within normal limits  Description: INTERVENTIONS:  - Monitor labs and assess patient for signs and symptoms of electrolyte imbalances  - Administer electrolyte replacement as ordered  - Monitor response to electrolyte replacements, including repeat lab results as appropriate  - Instruct patient on fluid and nutrition as appropriate  Outcome: Progressing  Goal: Fluid balance maintained  Description: INTERVENTIONS:  - Monitor labs   - Monitor I/O and WT  - Instruct patient on fluid and nutrition as appropriate  - Assess for signs & symptoms of volume excess or deficit  Outcome: Progressing     Problem: Nutrition/Hydration-ADULT  Goal: Nutrient/Hydration intake appropriate for improving, restoring or maintaining nutritional needs  Description: Monitor and assess patient's nutrition/hydration status for malnutrition. Collaborate with interdisciplinary team and initiate plan and interventions as ordered.  Monitor patient's weight and dietary intake as ordered or per policy. Utilize nutrition screening tool and intervene as necessary. Determine patient's food preferences and provide high-protein, high-caloric foods as appropriate.     INTERVENTIONS:  - Monitor oral intake, urinary output, labs, and treatment plans  - Assess nutrition and hydration status and recommend course of action  - Evaluate amount of meals eaten  - Assist patient with eating if necessary   - Allow adequate time for meals  - Recommend/ encourage  appropriate diets, oral nutritional supplements, and vitamin/mineral supplements  - Order, calculate, and assess calorie counts as needed  - Recommend, monitor, and adjust tube feedings and TPN/PPN based on assessed needs  - Assess need for intravenous fluids  - Provide specific nutrition/hydration education as appropriate  - Include patient/family/caregiver in decisions related to nutrition  Outcome: Progressing

## 2025-02-27 NOTE — DISCHARGE SUMMARY
"Discharge Summary - Hospitalist   Name: Naresh Carpio 64 y.o. male I MRN: [id number]  Unit/Bed#: [address] [place], [place] [id number] I Date of Admission: 2/24/2025   Date of Service: 2/27/2025 I Hospital Day: 2     Assessment & Plan  Generalized weakness  Patient presents due to generalized weakness/ambulatory dysfunction in the setting of missed HD sessions since he was discharged from the hospital 02/21/25 after being treated for Influenza A. He states he has not felt well enough to drive 45 minutes to his HD center.   Of note, patient with frequent readmissions due to weakness/fall/fatigue from missed HD sessions due to \"not feeling well\"   Generalized weakness is secondary to recent illness with flu and confounded by persistently missed dialysis sessions  When patient is feeling well, he is able to ambulate with walker and drive without difficulty. Sister and brother in law at bedside stating that during the winter, it is difficult for him to get to dialysis because of the ice. They are trying to push for patient to have a new HD clinic closer to his home, but patient is refusing this.   Patient lives at home alone, and frequently falls. In the past, he was not agreeable to PT/OT or having anyone come to the house.   Family requesting social support/assistance with transportation, maybe visiting aides, because they are not able to care for patient 24/7 or drive him to his HD when he isn't feeling well.   Discussed all options with the patient-patient prefers to keep his hemodialysis at [place]  ESRD (end stage renal disease) on dialysis (HCC)  Lab Results   Component Value Date    EGFR 5 02/27/2025    EGFR 4 02/25/2025    EGFR 4 02/24/2025    CREATININE 8.99 (H) 02/27/2025    CREATININE 11.06 (H) 02/25/2025    CREATININE 10.76 (H) 02/24/2025     ESRD on HD T/Th/Sat. Missed HD x 2 since he was discharged from the hospital on 02/21 due to \"feeling sick from the flu\".   Patient is not agreeable to switch HD clinic to a closer " facility, and family are not able to drive him to dialysis all the time.   Nephrology consulted for HD   Continue renal diet  Patient received hemodialysis on 2/25/25 and another session today    Chronic diastolic (congestive) heart failure (HCC)  Wt Readings from Last 3 Encounters:   02/27/25 102 kg (224 lb 13.9 oz)   02/18/25 107 kg (235 lb)   02/11/25 108 kg (237 lb)     Not in acute exacerbation   Volume management with HD   I/O, Daily weights          Type II diabetes mellitus (HCC)  Lab Results   Component Value Date    HGBA1C 5.7 (H) 02/09/2025       Recent Labs     02/26/25  1551 02/26/25  2110 02/27/25  0719 02/27/25  1138   POCGLU 104 142* 109 150*       Blood Sugar Average: Last 72 hrs:  (P) [id number]  Well controlled with diet   Blood sugars to stable    Hypertension  Continue Nifedipine   Hyponatremia  Chronic hyponatremia 2/2 ESRD   Management with HD  Continue fluid restriction  Paroxysmal atrial fibrillation (HCC)  Rate controlled   Continue Eliquis, Nifedipine  Anemia in ESRD (end-stage renal disease)  (HCC)  Hgb stable at baseline 8-9  Not on SURINDER as outpatient   hemoglobin below target but stable     Medical Problems       Resolved Problems  Date Reviewed: 1/18/2025          Resolved    Influenza A 2/27/2025     Resolved by  Joel Dalton MD        Discharging Physician / Practitioner: Joel Dalton MD  PCP: Jeffrey Jackson  Admission Date:   Admission Orders (From admission, onward)       Ordered        02/25/25 1714  INPATIENT ADMISSION  Once            02/24/25 2022  Place in Observation  Once                          Discharge Date: 02/27/25    Consultations During Hospital Stay:  Nephrology      Significant Findings / Test Results:   Chest x-ray with discoid right basilar atelectasis with small right pleural effusion       Outpatient Tests Requested:  Follow-up with dialysis center on Saturday    Complications:  None    Reason for Admission: Generalized weakness    Hospital  "Course:   Naresh Carpio is a 64 y.o. male patient with ESRD on HD, CHF, diabetes, atrial fibrillation , hypertension, hyperlipidemia, anemia who originally presented to the hospital on 2/24/2025 due to generalized weakness.  Patient missed his dialysis for 2 sessions due to weakness.  Patient had multiple previous admissions for similar complaints of missing dialysis due to weakness.  Patient had diagnosis of flu infection prior to admission.  Patient was admitted to hospital underwent dialysis 2 sessions on his regular dialysis days with ultrafiltration.  Patient was feeling better and was able to ambulate even though feeling slightly weak.  Discussed all the options including moving the dialysis center to closer facility versus moving to [place].  Patient prefers to keep his dialysis at [place].  Patient remained stable and will be discharged home    Please see above list of diagnoses and related plan for additional information.     Condition at Discharge: fair    Discharge Day Visit / Exam:   Subjective: Patient's cough has improved.  Patient is feeling slightly stronger.  Patient has been walking in the room.  Vitals: Blood Pressure: 145/71 (02/27/25 1145)  Pulse: 79 (02/27/25 1145)  Temperature: 97.9 °F (36.6 °C) (02/27/25 1145)  Temp Source: Tympanic (02/27/25 1145)  Respirations: 18 (02/27/25 1145)  Height: 6' 1\" (185.4 cm) (02/24/25 2154)  Weight - Scale: 102 kg (224 lb 13.9 oz) (02/27/25 0600)  SpO2: 97 % (02/27/25 0800)  Physical Exam  Constitutional:       Appearance: Normal appearance.   HENT:      Head: Normocephalic and atraumatic.   Eyes:      Extraocular Movements: Extraocular movements intact.      Pupils: Pupils are equal, round, and reactive to light.   Cardiovascular:      Rate and Rhythm: Normal rate and regular rhythm.      Heart sounds: No murmur heard.     No gallop.   Pulmonary:      Effort: Pulmonary effort is normal.      Breath sounds: Normal breath sounds.   Abdominal:      " General: Bowel sounds are normal.      Palpations: Abdomen is soft.      Tenderness: There is no abdominal tenderness.   Musculoskeletal:         General: No swelling or deformity. Normal range of motion.      Cervical back: Normal range of motion and neck supple.   Skin:     General: Skin is warm and dry.   Neurological:      General: No focal deficit present.      Mental Status: He is alert.             Discharge instructions/Information to patient and family:   See after visit summary for information provided to patient and family.      Provisions for Follow-Up Care:  See after visit summary for information related to follow-up care and any pertinent home health orders.      Mobility at time of Discharge:   Basic Mobility Inpatient Raw Score: 24  JH-HLM Goal: 8: Walk 250 feet or more  JH-HLM Achieved: 8: Walk 250 feet ot more  HLM Goal achieved. Continue to encourage appropriate mobility.     Disposition:   Home    Planned Readmission: No    Discharge Medications:  See after visit summary for reconciled discharge medications provided to patient and/or family.      Administrative Statements   Discharge Statement:  I have spent a total time of 35 minutes in caring for this patient on the day of the visit/encounter. .    **Please Note: This note may have been constructed using a voice recognition system**

## 2025-02-27 NOTE — ASSESSMENT & PLAN NOTE
"Patient presents due to generalized weakness/ambulatory dysfunction in the setting of missed HD sessions since he was discharged from the hospital 02/21/25 after being treated for Influenza A. He states he has not felt well enough to drive 45 minutes to his HD center.   Of note, patient with frequent readmissions due to weakness/fall/fatigue from missed HD sessions due to \"not feeling well\"   Generalized weakness is secondary to recent illness with flu and confounded by persistently missed dialysis sessions  When patient is feeling well, he is able to ambulate with walker and drive without difficulty. Sister and brother in law at bedside stating that during the winter, it is difficult for him to get to dialysis because of the ice. They are trying to push for patient to have a new HD clinic closer to his home, but patient is refusing this.   Patient lives at home alone, and frequently falls. In the past, he was not agreeable to PT/OT or having anyone come to the house.   Family requesting social support/assistance with transportation, maybe visiting aides, because they are not able to care for patient 24/7 or drive him to his HD when he isn't feeling well.   Discussed all options with the patient-patient prefers to keep his hemodialysis at [place]  "

## 2025-02-27 NOTE — ASSESSMENT & PLAN NOTE
Lab Results   Component Value Date    EGFR 5 02/27/2025    EGFR 4 02/25/2025    EGFR 4 02/24/2025    CREATININE 8.99 (H) 02/27/2025    CREATININE 11.06 (H) 02/25/2025    CREATININE 10.76 (H) 02/24/2025   -- Not on SURINDER as an outpatient  --Hemoglobin below target but stable

## 2025-02-27 NOTE — ASSESSMENT & PLAN NOTE
Wt Readings from Last 3 Encounters:   02/27/25 102 kg (224 lb 13.9 oz)   02/18/25 107 kg (235 lb)   02/11/25 108 kg (237 lb)   -- Ultrafiltration on dialysis.

## 2025-02-27 NOTE — ASSESSMENT & PLAN NOTE
Lab Results   Component Value Date    HGBA1C 5.7 (H) 02/09/2025       Recent Labs     02/26/25  1551 02/26/25  2110 02/27/25  0719 02/27/25  1138   POCGLU 104 142* 109 150*       Blood Sugar Average: Last 72 hrs:  (P) [id number]  Well controlled with diet   Blood sugars to stable

## 2025-02-27 NOTE — ASSESSMENT & PLAN NOTE
Lab Results   Component Value Date    EGFR 5 02/27/2025    EGFR 4 02/25/2025    EGFR 4 02/24/2025    CREATININE 8.99 (H) 02/27/2025    CREATININE 11.06 (H) 02/25/2025    CREATININE 10.76 (H) 02/24/2025   -- In center hemodialysis Tuesday Thursday Saturday at [facility]  --Access: Right IJ permacath  --Last outpatient dialysis was on Thursday, February 20th, he missed HD on Saturday due to not feeling well  --EDW 99 kg, admitted with a weight of 103 kg, UF may be limited due to blood pressure.  -- Seen on dialysis tolerating procedure well  --Stable from renal standpoint for discharge

## 2025-02-27 NOTE — ASSESSMENT & PLAN NOTE
Wt Readings from Last 3 Encounters:   02/27/25 102 kg (224 lb 13.9 oz)   02/18/25 107 kg (235 lb)   02/11/25 108 kg (237 lb)     Not in acute exacerbation   Volume management with HD   I/O, Daily weights

## 2025-02-27 NOTE — ASSESSMENT & PLAN NOTE
"Lab Results   Component Value Date    EGFR 5 02/27/2025    EGFR 4 02/25/2025    EGFR 4 02/24/2025    CREATININE 8.99 (H) 02/27/2025    CREATININE 11.06 (H) 02/25/2025    CREATININE 10.76 (H) 02/24/2025     ESRD on HD T/Th/Sat. Missed HD x 2 since he was discharged from the hospital on 02/21 due to \"feeling sick from the flu\".   Patient is not agreeable to switch HD clinic to a closer facility, and family are not able to drive him to dialysis all the time.   Nephrology consulted for HD   Continue renal diet  Patient received hemodialysis on 2/25/25 and another session today    "

## 2025-02-28 ENCOUNTER — PATIENT OUTREACH (OUTPATIENT)
Dept: CASE MANAGEMENT | Facility: OTHER | Age: 65
End: 2025-02-28

## 2025-02-28 NOTE — PROGRESS NOTES
OPCM SW received referral for transportation.  Per chart review, patient was discharged home yesterday from the hospital after declining STR and would need transportation resources, as patient is an HD patient and may need assistance in the future.  Per chart review, patient confirmed that he will get to HD on Saturday and to call his sister only if he is unable to get transport.    OPCM CURTIS reached out via phone and left a VM

## 2025-03-01 LAB
ATRIAL RATE: 77 BPM
P AXIS: 53 DEGREES
PR INTERVAL: 180 MS
QRS AXIS: 3 DEGREES
QRSD INTERVAL: 82 MS
QT INTERVAL: 420 MS
QTC INTERVAL: 475 MS
T WAVE AXIS: 77 DEGREES
VENTRICULAR RATE: 77 BPM

## 2025-03-01 PROCEDURE — 93010 ELECTROCARDIOGRAM REPORT: CPT | Performed by: INTERNAL MEDICINE

## 2025-03-06 ENCOUNTER — PATIENT OUTREACH (OUTPATIENT)
Dept: CASE MANAGEMENT | Facility: OTHER | Age: 65
End: 2025-03-06

## 2025-03-06 NOTE — PROGRESS NOTES
JEANNE ACEVEDO attempted second outreach to patient regarding transportation referral.  JEANNE ACEVEDO left a VM and sent [facility]TR letter to his address on file

## 2025-03-24 ENCOUNTER — HOSPITAL ENCOUNTER (INPATIENT)
Facility: HOSPITAL | Age: 65
LOS: 4 days | Discharge: NURSING, REHAB OR LONG-TERM CARE | DRG: 463 | End: 2025-03-29
Attending: EMERGENCY MEDICINE | Admitting: STUDENT IN AN ORGANIZED HEALTH CARE EDUCATION/TRAINING PROGRAM
Payer: MEDICARE

## 2025-03-24 ENCOUNTER — APPOINTMENT (OUTPATIENT)
Dept: DIALYSIS | Facility: HOSPITAL | Age: 65
DRG: 463 | End: 2025-03-24
Payer: MEDICARE

## 2025-03-24 ENCOUNTER — APPOINTMENT (EMERGENCY)
Dept: RADIOLOGY | Facility: HOSPITAL | Age: 65
DRG: 463 | End: 2025-03-24
Payer: MEDICARE

## 2025-03-24 DIAGNOSIS — E11.42 DIABETIC POLYNEUROPATHY ASSOCIATED WITH TYPE 2 DIABETES MELLITUS (HCC): ICD-10-CM

## 2025-03-24 DIAGNOSIS — E87.5 HYPERKALEMIA: Primary | ICD-10-CM

## 2025-03-24 DIAGNOSIS — N18.6 ESRD ON DIALYSIS (HCC): ICD-10-CM

## 2025-03-24 DIAGNOSIS — T14.8XXA CHRONIC WOUND: ICD-10-CM

## 2025-03-24 DIAGNOSIS — S32.029A L2 VERTEBRAL FRACTURE (HCC): ICD-10-CM

## 2025-03-24 DIAGNOSIS — Z99.2 ESRD ON DIALYSIS (HCC): ICD-10-CM

## 2025-03-24 PROBLEM — R25.9 ABNORMAL INVOLUNTARY MOVEMENT: Status: ACTIVE | Noted: 2025-03-24

## 2025-03-24 PROBLEM — E87.8 DISORDERS OF FLUID, ELECTROLYTE, AND ACID-BASE BALANCE: Status: ACTIVE | Noted: 2025-03-24

## 2025-03-24 PROBLEM — Z91.199 NON-COMPLIANCE WITH TREATMENT: Status: ACTIVE | Noted: 2025-03-24

## 2025-03-24 PROBLEM — E87.8 DISORDER OF ACID-BASE BALANCE: Status: ACTIVE | Noted: 2025-03-24

## 2025-03-24 LAB
ALBUMIN SERPL BCG-MCNC: 3.9 G/DL (ref 3.5–5)
ALP SERPL-CCNC: 94 U/L (ref 34–104)
ALT SERPL W P-5'-P-CCNC: 17 U/L (ref 7–52)
ANION GAP SERPL CALCULATED.3IONS-SCNC: 14 MMOL/L (ref 4–13)
AST SERPL W P-5'-P-CCNC: 13 U/L (ref 13–39)
ATRIAL RATE: 54 BPM
BASOPHILS # BLD AUTO: 0.04 THOUSANDS/ÂΜL (ref 0–0.1)
BASOPHILS NFR BLD AUTO: 0 % (ref 0–1)
BILIRUB SERPL-MCNC: 0.42 MG/DL (ref 0.2–1)
BUN SERPL-MCNC: 95 MG/DL (ref 5–25)
CALCIUM SERPL-MCNC: 7.6 MG/DL (ref 8.4–10.2)
CHLORIDE SERPL-SCNC: 100 MMOL/L (ref 96–108)
CO2 SERPL-SCNC: 19 MMOL/L (ref 21–32)
CREAT SERPL-MCNC: 10.44 MG/DL (ref 0.6–1.3)
EOSINOPHIL # BLD AUTO: 0.32 THOUSAND/ÂΜL (ref 0–0.61)
EOSINOPHIL NFR BLD AUTO: 3 % (ref 0–6)
ERYTHROCYTE [DISTWIDTH] IN BLOOD BY AUTOMATED COUNT: 17.8 % (ref 11.6–15.1)
GFR SERPL CREATININE-BSD FRML MDRD: 4 ML/MIN/1.73SQ M
GLUCOSE SERPL-MCNC: 169 MG/DL (ref 65–140)
GLUCOSE SERPL-MCNC: 182 MG/DL (ref 65–140)
HCT VFR BLD AUTO: 33.7 % (ref 36.5–49.3)
HGB BLD-MCNC: 10.3 G/DL (ref 12–17)
IMM GRANULOCYTES # BLD AUTO: 0.1 THOUSAND/UL (ref 0–0.2)
IMM GRANULOCYTES NFR BLD AUTO: 1 % (ref 0–2)
LYMPHOCYTES # BLD AUTO: 0.65 THOUSANDS/ÂΜL (ref 0.6–4.47)
LYMPHOCYTES NFR BLD AUTO: 6 % (ref 14–44)
MCH RBC QN AUTO: 29.1 PG (ref 26.8–34.3)
MCHC RBC AUTO-ENTMCNC: 30.6 G/DL (ref 31.4–37.4)
MCV RBC AUTO: 95 FL (ref 82–98)
MONOCYTES # BLD AUTO: 1.1 THOUSAND/ÂΜL (ref 0.17–1.22)
MONOCYTES NFR BLD AUTO: 11 % (ref 4–12)
NEUTROPHILS # BLD AUTO: 7.92 THOUSANDS/ÂΜL (ref 1.85–7.62)
NEUTS SEG NFR BLD AUTO: 79 % (ref 43–75)
NRBC BLD AUTO-RTO: 0 /100 WBCS
P AXIS: 54 DEGREES
PLATELET # BLD AUTO: 182 THOUSANDS/UL (ref 149–390)
PMV BLD AUTO: 10.4 FL (ref 8.9–12.7)
POTASSIUM SERPL-SCNC: 6.5 MMOL/L (ref 3.5–5.3)
PR INTERVAL: 130 MS
PROT SERPL-MCNC: 7.3 G/DL (ref 6.4–8.4)
QRS AXIS: -4 DEGREES
QRSD INTERVAL: 76 MS
QT INTERVAL: 490 MS
QTC INTERVAL: 464 MS
RBC # BLD AUTO: 3.54 MILLION/UL (ref 3.88–5.62)
SODIUM SERPL-SCNC: 133 MMOL/L (ref 135–147)
T WAVE AXIS: 69 DEGREES
VENTRICULAR RATE: 54 BPM
WBC # BLD AUTO: 10.13 THOUSAND/UL (ref 4.31–10.16)

## 2025-03-24 PROCEDURE — 72131 CT LUMBAR SPINE W/O DYE: CPT

## 2025-03-24 PROCEDURE — 93010 ELECTROCARDIOGRAM REPORT: CPT | Performed by: INTERNAL MEDICINE

## 2025-03-24 PROCEDURE — 99291 CRITICAL CARE FIRST HOUR: CPT | Performed by: EMERGENCY MEDICINE

## 2025-03-24 PROCEDURE — 99215 OFFICE O/P EST HI 40 MIN: CPT | Performed by: INTERNAL MEDICINE

## 2025-03-24 PROCEDURE — 96374 THER/PROPH/DIAG INJ IV PUSH: CPT

## 2025-03-24 PROCEDURE — 74176 CT ABD & PELVIS W/O CONTRAST: CPT

## 2025-03-24 PROCEDURE — 94644 CONT INHLJ TX 1ST HOUR: CPT

## 2025-03-24 PROCEDURE — 70450 CT HEAD/BRAIN W/O DYE: CPT

## 2025-03-24 PROCEDURE — 36415 COLL VENOUS BLD VENIPUNCTURE: CPT | Performed by: EMERGENCY MEDICINE

## 2025-03-24 PROCEDURE — 73502 X-RAY EXAM HIP UNI 2-3 VIEWS: CPT

## 2025-03-24 PROCEDURE — 73030 X-RAY EXAM OF SHOULDER: CPT

## 2025-03-24 PROCEDURE — NC001 PR NO CHARGE: Performed by: INTERNAL MEDICINE

## 2025-03-24 PROCEDURE — 82948 REAGENT STRIP/BLOOD GLUCOSE: CPT

## 2025-03-24 PROCEDURE — 85025 COMPLETE CBC W/AUTO DIFF WBC: CPT | Performed by: EMERGENCY MEDICINE

## 2025-03-24 PROCEDURE — 99285 EMERGENCY DEPT VISIT HI MDM: CPT

## 2025-03-24 PROCEDURE — 93005 ELECTROCARDIOGRAM TRACING: CPT

## 2025-03-24 PROCEDURE — G0257 UNSCHED DIALYSIS ESRD PT HOS: HCPCS

## 2025-03-24 PROCEDURE — 99223 1ST HOSP IP/OBS HIGH 75: CPT | Performed by: FAMILY MEDICINE

## 2025-03-24 PROCEDURE — 71045 X-RAY EXAM CHEST 1 VIEW: CPT

## 2025-03-24 PROCEDURE — 80053 COMPREHEN METABOLIC PANEL: CPT | Performed by: EMERGENCY MEDICINE

## 2025-03-24 RX ORDER — SEVELAMER HYDROCHLORIDE 800 MG/1
800 TABLET, FILM COATED ORAL
Status: DISCONTINUED | OUTPATIENT
Start: 2025-03-24 | End: 2025-03-29 | Stop reason: HOSPADM

## 2025-03-24 RX ORDER — ALBUTEROL SULFATE 5 MG/ML
10 SOLUTION RESPIRATORY (INHALATION) ONCE
Status: COMPLETED | OUTPATIENT
Start: 2025-03-24 | End: 2025-03-24

## 2025-03-24 RX ORDER — DEXTROSE MONOHYDRATE 25 G/50ML
25 INJECTION, SOLUTION INTRAVENOUS ONCE
Status: COMPLETED | OUTPATIENT
Start: 2025-03-24 | End: 2025-03-24

## 2025-03-24 RX ORDER — ACETAMINOPHEN 325 MG/1
975 TABLET ORAL EVERY 8 HOURS SCHEDULED
Status: DISCONTINUED | OUTPATIENT
Start: 2025-03-24 | End: 2025-03-29 | Stop reason: HOSPADM

## 2025-03-24 RX ORDER — ACETAMINOPHEN 325 MG/1
975 TABLET ORAL ONCE
Status: COMPLETED | OUTPATIENT
Start: 2025-03-24 | End: 2025-03-24

## 2025-03-24 RX ORDER — LIDOCAINE 50 MG/G
1 PATCH TOPICAL ONCE
Status: COMPLETED | OUTPATIENT
Start: 2025-03-24 | End: 2025-03-25

## 2025-03-24 RX ORDER — HYDROMORPHONE HCL/PF 1 MG/ML
0.5 SYRINGE (ML) INJECTION EVERY 6 HOURS PRN
Status: DISCONTINUED | OUTPATIENT
Start: 2025-03-24 | End: 2025-03-25

## 2025-03-24 RX ORDER — HYDROMORPHONE HCL/PF 1 MG/ML
0.5 SYRINGE (ML) INJECTION ONCE
Status: COMPLETED | OUTPATIENT
Start: 2025-03-24 | End: 2025-03-24

## 2025-03-24 RX ORDER — CALCIUM GLUCONATE 20 MG/ML
1 INJECTION, SOLUTION INTRAVENOUS ONCE
Status: COMPLETED | OUTPATIENT
Start: 2025-03-24 | End: 2025-03-24

## 2025-03-24 RX ORDER — ASPIRIN 81 MG/1
81 TABLET, CHEWABLE ORAL DAILY
Status: DISCONTINUED | OUTPATIENT
Start: 2025-03-25 | End: 2025-03-29 | Stop reason: HOSPADM

## 2025-03-24 RX ORDER — ALLOPURINOL 100 MG/1
100 TABLET ORAL DAILY
Status: DISCONTINUED | OUTPATIENT
Start: 2025-03-25 | End: 2025-03-29 | Stop reason: HOSPADM

## 2025-03-24 RX ORDER — GABAPENTIN 100 MG/1
100 CAPSULE ORAL 3 TIMES WEEKLY
Status: DISCONTINUED | OUTPATIENT
Start: 2025-03-25 | End: 2025-03-29 | Stop reason: HOSPADM

## 2025-03-24 RX ORDER — INDOMETHACIN 25 MG/1
50 CAPSULE ORAL ONCE
Status: COMPLETED | OUTPATIENT
Start: 2025-03-24 | End: 2025-03-24

## 2025-03-24 RX ORDER — METHOCARBAMOL 500 MG/1
500 TABLET, FILM COATED ORAL EVERY 6 HOURS PRN
Status: DISCONTINUED | OUTPATIENT
Start: 2025-03-24 | End: 2025-03-29 | Stop reason: HOSPADM

## 2025-03-24 RX ORDER — ATORVASTATIN CALCIUM 80 MG/1
80 TABLET, FILM COATED ORAL
Status: DISCONTINUED | OUTPATIENT
Start: 2025-03-24 | End: 2025-03-29 | Stop reason: HOSPADM

## 2025-03-24 RX ORDER — NIFEDIPINE 30 MG/1
30 TABLET, EXTENDED RELEASE ORAL
Status: DISCONTINUED | OUTPATIENT
Start: 2025-03-24 | End: 2025-03-29 | Stop reason: HOSPADM

## 2025-03-24 RX ORDER — LIDOCAINE 50 MG/G
1 PATCH TOPICAL DAILY
Status: DISCONTINUED | OUTPATIENT
Start: 2025-03-25 | End: 2025-03-29

## 2025-03-24 RX ADMIN — HYDROMORPHONE HYDROCHLORIDE 0.5 MG: 1 INJECTION, SOLUTION INTRAMUSCULAR; INTRAVENOUS; SUBCUTANEOUS at 20:26

## 2025-03-24 RX ADMIN — ACETAMINOPHEN 975 MG: 325 TABLET ORAL at 21:50

## 2025-03-24 RX ADMIN — CALCIUM GLUCONATE 1 G: 20 INJECTION, SOLUTION INTRAVENOUS at 16:47

## 2025-03-24 RX ADMIN — APIXABAN 5 MG: 5 TABLET, FILM COATED ORAL at 18:49

## 2025-03-24 RX ADMIN — NIFEDIPINE 30 MG: 30 TABLET, EXTENDED RELEASE ORAL at 18:49

## 2025-03-24 RX ADMIN — DEXTROSE MONOHYDRATE 25 ML: 25 INJECTION, SOLUTION INTRAVENOUS at 16:23

## 2025-03-24 RX ADMIN — INSULIN HUMAN 10 UNITS: 100 INJECTION, SOLUTION PARENTERAL at 16:23

## 2025-03-24 RX ADMIN — ACETAMINOPHEN 975 MG: 325 TABLET ORAL at 14:37

## 2025-03-24 RX ADMIN — Medication 2.5 MG: at 18:49

## 2025-03-24 RX ADMIN — SODIUM BICARBONATE 50 MEQ: 84 INJECTION INTRAVENOUS at 16:18

## 2025-03-24 RX ADMIN — ATORVASTATIN CALCIUM 80 MG: 80 TABLET, FILM COATED ORAL at 18:49

## 2025-03-24 RX ADMIN — HYDROMORPHONE HYDROCHLORIDE 0.5 MG: 1 INJECTION, SOLUTION INTRAMUSCULAR; INTRAVENOUS; SUBCUTANEOUS at 15:38

## 2025-03-24 RX ADMIN — LIDOCAINE 1 PATCH: 700 PATCH TOPICAL at 14:40

## 2025-03-24 RX ADMIN — ALBUTEROL SULFATE 10 MG: 2.5 SOLUTION RESPIRATORY (INHALATION) at 16:06

## 2025-03-24 NOTE — ASSESSMENT & PLAN NOTE
Wt Readings from Last 3 Encounters:   02/27/25 102 kg (224 lb 13.9 oz)   02/18/25 107 kg (235 lb)   02/11/25 108 kg (237 lb)     Being managed with dialysis  Chest x-ray negative for acute pathology

## 2025-03-24 NOTE — PLAN OF CARE
Problem: PAIN - ADULT  Goal: Verbalizes/displays adequate comfort level or baseline comfort level  Description: Interventions:- Encourage patient to monitor pain and request assistance- Assess pain using appropriate pain scale- Administer analgesics based on type and severity of pain and evaluate response- Implement non-pharmacological measures as appropriate and evaluate response- Consider cultural and social influences on pain and pain management- Notify physician/advanced practitioner if interventions unsuccessful or patient reports new pain  Outcome: Progressing     Problem: SAFETY ADULT  Goal: Patient will remain free of falls  Description: INTERVENTIONS:- Educate patient/family on patient safety including physical limitations- Instruct patient to call for assistance with activity - Consult OT/PT to assist with strengthening/mobility - Keep Call bell within reach- Keep bed low and locked with side rails adjusted as appropriate- Keep care items and personal belongings within reach- Initiate and maintain comfort rounds- Make Fall Risk Sign visible to staff- Offer Toileting every 2 Hours, in advance of need- Initiate/Maintain bed alarm- Obtain necessary fall risk management equipment: bed alarm - Apply yellow socks and bracelet for high fall risk patients- Consider moving patient to room near nurses station  Outcome: Progressing  Goal: Maintains/Returns to pre admission functional level  Description: INTERVENTIONS:- Perform AM-PAC 6 Click Basic Mobility/ Daily Activity assessment daily.- Set and communicate daily mobility goal to care team and patient/family/caregiver. - Collaborate with rehabilitation services on mobility goals if consulted- Perform Range of Motion 3 times a day.- Reposition patient every 2 hours.- Dangle patient 4 times a day- Stand patient 3 times a day- Ambulate patient 2 times a day- Out of bed to chair 3 times a day - Out of bed for meals 3 times a day- Out of bed for toileting- Record  patient progress and toleration of activity level   Outcome: Progressing     Problem: DISCHARGE PLANNING  Goal: Discharge to home or other facility with appropriate resources  Description: INTERVENTIONS:- Identify barriers to discharge w/patient and caregiver- Arrange for needed discharge resources and transportation as appropriate- Identify discharge learning needs (meds, wound care, etc.)- Arrange for interpretive services to assist at discharge as needed- Refer to Case Management Department for coordinating discharge planning if the patient needs post-hospital services based on physician/advanced practitioner order or complex needs related to functional status, cognitive ability, or social support system  Outcome: Progressing     Problem: Knowledge Deficit  Goal: Patient/family/caregiver demonstrates understanding of disease process, treatment plan, medications, and discharge instructions  Description: Complete learning assessment and assess knowledge base.Interventions:- Provide teaching at level of understanding- Provide teaching via preferred learning methods  Outcome: Progressing     Problem: NEUROSENSORY - ADULT  Goal: Achieves stable or improved neurological status  Description: INTERVENTIONS- Monitor and report changes in neurological status- Monitor vital signs such as temperature, blood pressure, glucose, and any other labs ordered - Initiate measures to prevent increased intracranial pressure- Monitor for seizure activity and implement precautions if appropriate    Outcome: Progressing     Problem: METABOLIC, FLUID AND ELECTROLYTES - ADULT  Goal: Electrolytes maintained within normal limits  Description: INTERVENTIONS:- Monitor labs and assess patient for signs and symptoms of electrolyte imbalances- Administer electrolyte replacement as ordered- Monitor response to electrolyte replacements, including repeat lab results as appropriate- Instruct patient on fluid and nutrition as appropriate  Outcome:  Progressing     Problem: MUSCULOSKELETAL - ADULT  Goal: Maintain or return mobility to safest level of function  Description: INTERVENTIONS:- Assess patient's ability to carry out ADLs; assess patient's baseline for ADL function and identify physical deficits which impact ability to perform ADLs (bathing, care of mouth/teeth, toileting, grooming, dressing, etc.)- Assess/evaluate cause of self-care deficits - Assess range of motion- Assess patient's mobility- Assess patient's need for assistive devices and provide as appropriate- Encourage maximum independence but intervene and supervise when necessary- Involve family in performance of ADLs- Assess for home care needs following discharge - Consider OT consult to assist with ADL evaluation and planning for discharge- Provide patient education as appropriate  Outcome: Progressing

## 2025-03-24 NOTE — ASSESSMENT & PLAN NOTE
Lab Results   Component Value Date    EGFR 4 03/24/2025    EGFR 5 02/27/2025    EGFR 4 02/25/2025    CREATININE 10.44 (H) 03/24/2025    CREATININE 8.99 (H) 02/27/2025    CREATININE 11.06 (H) 02/25/2025   For management of hyperphosphatemia continue sevelamer  Outpatient follow-up  Renal diet

## 2025-03-24 NOTE — ASSESSMENT & PLAN NOTE
Hemoglobin stable.  Repeat lab work in a.m.  Results from last 7 days   Lab Units 03/24/25  1434   HEMOGLOBIN g/dL 10.3*   HEMATOCRIT % 33.7*   MCV fL 95

## 2025-03-24 NOTE — ASSESSMENT & PLAN NOTE
Lab Results   Component Value Date    EGFR 4 03/24/2025    EGFR 5 02/27/2025    EGFR 4 02/25/2025    CREATININE 10.44 (H) 03/24/2025    CREATININE 8.99 (H) 02/27/2025    CREATININE 11.06 (H) 02/25/2025   Target hemoglobin 10-11  Current hemoglobin at target  Not on any SURINDER as outpatient  Continue to monitor

## 2025-03-24 NOTE — ASSESSMENT & PLAN NOTE
Blood pressure slightly elevated in the setting of acute pain and missed treatments  Hold antihypertensives prior to dialysis

## 2025-03-24 NOTE — CONSULTS
NEPHROLOGY HOSPITAL CONSULTATION   Naresh Carpio 64 y.o. male MRN: [id number]  Unit/Bed#: ED 12 Encounter: [id number]    Brief History of Admission -64-year-old male who presented with fall.  Noted to have L2 vertebral body fracture on CT.  Mr. Jones is a dialysis patient on hemodialysis every Tuesday, Thursday, Saturday.  He missed 2 treatments last week because of diarrhea.  He had 1 treatment on Saturday.  He reports muscle twitching which is getting worse over the last few days.  Chief complaint is back pain.  Nephrology consulted for management of ESRD/hemodialysis    Assessment & Plan  ESRD (end stage renal disease) on dialysis ([facility])  Lab Results   Component Value Date    EGFR 4 03/24/2025    EGFR 5 02/27/2025    EGFR 4 02/25/2025    CREATININE 10.44 (H) 03/24/2025    CREATININE 8.99 (H) 02/27/2025    CREATININE 11.06 (H) 02/25/2025   Outpatient dialysis TTS at [facility]  Access: Right IJ PermCath.  No swelling tenderness or redness noted in the area. Dressing to be changed during HD  Target weight 99 kg as of recent hospitalization  Patient missed 2 treatments last week.  He only had 1 treatment on Saturday.  Hyperkalemia, metabolic acidosis, questionable uremia versus gabapentin affect causing significant muscle twitches.  Will plan on hemodialysis treatment today for 2 hours.  We will run on 2K for the first hour and 1K the second hour  Next hemodialysis treatment tomorrow 3/25  Fall  CT: Acute fracture of L2 vertebral body  Continues to complain of significant back pain  Hypertension  Blood pressure slightly elevated in the setting of acute pain and missed treatments  Hold antihypertensives prior to dialysis  Anemia in ESRD (end-stage renal disease)  ([facility])  Lab Results   Component Value Date    EGFR 4 03/24/2025    EGFR 5 02/27/2025    EGFR 4 02/25/2025    CREATININE 10.44 (H) 03/24/2025    CREATININE 8.99 (H) 02/27/2025    CREATININE 11.06 (H) 02/25/2025   Target hemoglobin 10-11  Current  hemoglobin at target  Not on any SURINDER as outpatient  Continue to monitor  Abnormal involuntary movement  Frequent muscle twitching.  Uremia versus effect of gabapentin.  Patient reports taking gabapentin twice a day which according to his home doses 200 mg twice a day but states he took an extra dose today  Patient only had 1 hemodialysis treatment last week.   50% of gabapentin is dialyzed over a 4-hour period  Total milligram dose of 400 mg daily is excessive for dialysis patient.  Max dose 300 mg after dialysis  Recommendation: Reduce gabapentin dose to 100 mg after dialysis  Disorders of fluid, electrolyte, and acid-base balance  Hyperkalemia: Has not been eating more than usual of high potassium foods.  Likely related to missed treatments.  Only had 1 hemodialysis treatment last week  Plan for low K bath on dialysis today.  2K bath for first hour, 1K bath for second hour  Temporizing treatment  Hemodialysis tomorrow.  Adjust K bath as needed  Low bicarbonate in the setting of missed hemodialysis treatment  Type II diabetes mellitus (HCC)    Lab Results   Component Value Date    HGBA1C 5.7 (H) 02/09/2025   Management per primary team  Hyponatremia  Mild, likely volume mediated  Volume removal on dialysis  Fluid restriction  Chronic diastolic (congestive) heart failure (HCC)  Wt Readings from Last 3 Encounters:   02/27/25 102 kg (224 lb 13.9 oz)   02/18/25 107 kg (235 lb)   02/11/25 108 kg (237 lb)   Volume removal on dialysis  Chest x-ray: Lungs clear          Paroxysmal atrial fibrillation (HCC)  Outpatient: On Eliquis  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, on chronic dialysis (HCC)  Lab Results   Component Value Date    EGFR 4 03/24/2025    EGFR 5 02/27/2025    EGFR 4 02/25/2025    CREATININE 10.44 (H) 03/24/2025    CREATININE 8.99 (H) 02/27/2025    CREATININE 11.06 (H) 02/25/2025   For management of hyperphosphatemia continue sevelamer  Outpatient  follow-up  Renal diet        HISTORY OF PRESENT ILLNESS:  Requesting Physician: Solomon Davis MD  Reason for Consult: ESRD on hemodialysis    Naresh Carpio is a 64 y.o. male with a history of ESRD on hemodialysis, diabetes mellitus, hypertension, anemia, PAF, diastolic CHF, frequent admissions, frequent falls who was admitted to [facility] after presenting with fall.  Patient seen in the emergency room.  Complaining of back pain.  He reports that he was making tea and lost motor control and fell.  Having abnormal muscle twitches.  Patient reports that he has had them for a few days but today the twitching is much worse.  He denies difficulty breathing at rest.  Has significant neuropathy.  Blood noted on his feet.  Did not turn or reposition him since CT scan was still not read.  CT of the spine shows lumbar fracture.  A renal consultation is requested today for assistance in the management of ESRD/ hemodialysis.    PAST MEDICAL HISTORY:  Past Medical History:   Diagnosis Date    Acute cystitis 10/18/2024    Acute on chronic anemia 01/23/2022    Atrial fibrillation (HCC)     Bradycardia 09/05/2023    Diabetes mellitus (HCC)     ESRD (end stage renal disease) on dialysis (HCC)     Hematuria 10/10/2024    Hematuria 10/10/2024    Hyperkalemia 04/06/2024    Hyperkalemia 04/06/2024    Hyperlipidemia     Hypertension     Left toe amputee (HCC)     TIA (transient ischemic attack)     Toxic metabolic encephalopathy 10/08/2024       PAST SURGICAL HISTORY:  Past Surgical History:   Procedure Laterality Date    AMPUTATION Left     left foot resection 10 years ago dr tran    HEMODIALYSIS ADULT  1/18/2025    HEMODIALYSIS ADULT  2/27/2025    IR LOWER EXTREMITY ANGIOGRAM  08/29/2023    IR TEMPORARY DIALYSIS CATHETER PLACEMENT  08/25/2023    IR TUNNELED CENTRAL LINE REMOVAL  08/23/2023    IR TUNNELED DIALYSIS CATHETER PLACEMENT  01/27/2022    IR TUNNELED DIALYSIS CATHETER PLACEMENT  08/30/2023    HI AMPUTATION  METATARSAL W/TOE SINGLE Right 8/31/2023    Procedure: RIGHT PARTIAL 1ST RAY RESECTION WITH  WOUND VAC APPLICATION;  Surgeon: Won Parmar DPM;  Location: [facility];  Service: Podiatry       ALLERGIES:  No Known Allergies    SOCIAL HISTORY:  Social History     Substance and Sexual Activity   Alcohol Use Not Currently    Alcohol/week: 0.0 standard drinks of alcohol    Comment: 0     Social History     Substance and Sexual Activity   Drug Use Never     Social History     Tobacco Use   Smoking Status Never    Passive exposure: Never   Smokeless Tobacco Never       FAMILY HISTORY:  No family history on file.    MEDICATIONS:    Current Facility-Administered Medications:     HYDROmorphone (DILAUDID) injection 0.5 mg, 0.5 mg, Intravenous, Once, Solomon Davis MD    lidocaine (LIDODERM) 5 % patch 1 patch, 1 patch, Topical, Once, Solomon Davis MD, 1 patch at 03/24/25 1440    Current Outpatient Medications:     allopurinol (ZYLOPRIM) 100 mg tablet, Take 1 tablet (100 mg total) by mouth daily, Disp: 30 tablet, Rfl: 0    apixaban (Eliquis) 5 mg, Take 1 tablet (5 mg total) by mouth 2 (two) times a day, Disp: 60 tablet, Rfl: 0    aspirin 81 mg chewable tablet, Chew 1 tablet (81 mg total) daily, Disp: 30 tablet, Rfl: 0    atorvastatin (LIPITOR) 80 mg tablet, Take 80 mg by mouth daily, Disp: , Rfl:     dextromethorphan-guaiFENesin (ROBITUSSIN DM)  mg/5 mL oral syrup, Take 10 mL by mouth every 4 (four) hours as needed (cough), Disp: 118 mL, Rfl: 0    gabapentin (NEURONTIN) 100 mg capsule, Take 2 capsules (200 mg total) by mouth 2 (two) times a day, Disp: 120 capsule, Rfl: 0    NIFEdipine (PROCARDIA XL) 30 mg 24 hr tablet, Take 1 tablet (30 mg total) by mouth daily after dinner, Disp: , Rfl:     sevelamer (RENAGEL) 800 mg tablet, Take 1 tablet (800 mg total) by mouth 3 (three) times a day with meals, Disp: , Rfl: 0    REVIEW OF SYSTEMS:  Constitutional: Complains of frequent falls, weakness  HENT: Negative for  postnasal drip  Eyes: Negative for visual disturbance.   Respiratory: Negative for cough, shortness of breath   Cardiovascular: Negative for chest pain, palpitations   Gastrointestinal: Negative for abdominal pain, constipation, diarrhea, nausea and vomiting.   Genitourinary: No dysuria, hematuria  Musculoskeletal: Positive for arthralgia, back pain  Skin: Negative for rash.   Neurological: Negative for focal weakness.  Positive for significant neuropathy  Hematological: Positive for wounds on his feet.  Some blood noted on his feet  Psychiatric/Behavioral: Negative for confusion   All the systems were reviewed and were negative except as documented on the HPI.    PHYSICAL EXAM:  Current Weight:    First Weight:    Vitals:    03/24/25 1407   BP: 152/70   BP Location: Right arm   Pulse: 55   Resp: 18   Temp: 97.8 °F (36.6 °C)   TempSrc: Oral   SpO2: 97%     No intake or output data in the 24 hours ending 03/24/25 1535  Physical Exam  Constitutional:       General: He is not in acute distress.     Appearance: He is ill-appearing. He is not diaphoretic.   HENT:      Head: Normocephalic and atraumatic.      Nose: Nose normal. No congestion.      Mouth/Throat:      Mouth: Mucous membranes are dry.   Eyes:      Extraocular Movements: Extraocular movements intact.      Conjunctiva/sclera: Conjunctivae normal.   Neck:      Vascular: No carotid bruit.   Cardiovascular:      Rate and Rhythm: Normal rate. Rhythm irregular.      Heart sounds: No murmur heard.     No friction rub. No gallop.   Pulmonary:      Effort: Pulmonary effort is normal. No respiratory distress.      Breath sounds: No stridor. No wheezing, rhonchi or rales.   Abdominal:      General: Bowel sounds are normal. There is no distension.      Palpations: Abdomen is soft.      Tenderness: There is no abdominal tenderness.   Musculoskeletal:         General: Tenderness and signs of injury present.      Cervical back: No rigidity or tenderness.      Right lower  "leg: Edema present.      Left lower leg: Edema present.   Skin:     Coloration: Skin is not jaundiced.      Findings: Bruising and erythema present. No lesion or rash.   Neurological:      General: No focal deficit present.      Mental Status: He is alert and oriented to person, place, and time.      Comments: Involuntary, frequent muscle twitches   Psychiatric:         Mood and Affect: Mood normal.         Behavior: Behavior normal.         Thought Content: Thought content normal.           Invasive Devices:      Lab Results:   Results from last 7 days   Lab Units 03/24/25  1434   WBC Thousand/uL 10.13   HEMOGLOBIN g/dL 10.3*   HEMATOCRIT % 33.7*   PLATELETS Thousands/uL 182   POTASSIUM mmol/L 6.5*   CHLORIDE mmol/L 100   CO2 mmol/L 19*   BUN mg/dL 95*   CREATININE mg/dL 10.44*   CALCIUM mg/dL 7.6*   ALK PHOS U/L 94   ALT U/L 17   AST U/L 13     Other Studies:     Portions of the record may have been created with voice recognition software. Occasional wrong word or \"sound a like\" substitutions may have occurred due to the inherent limitations of voice recognition software. Read the chart carefully and recognize, using context, where substitutions have occurred.If you have any questions, please contact the dictating provider.   "

## 2025-03-24 NOTE — ED PROVIDER NOTES
Time reflects when diagnosis was documented in both MDM as applicable and the Disposition within this note       Time User Action Codes Description Comment    3/24/2025  3:44 PM Solomon Davis Add [S32.029A] L2 vertebral fracture (HCC)     3/24/2025  3:44 PM Solomon Davis Add [E87.5] Hyperkalemia     3/24/2025  3:44 PM Solomon Davis Add [N18.6,  Z99.2] ESRD on dialysis (HCC)     3/24/2025  3:50 PM Solomon Davis Modify [S32.029A] L2 vertebral fracture (HCC)     3/24/2025  3:50 PM Solomon Davis Modify [E87.5] Hyperkalemia           ED Disposition       ED Disposition   Admit    Condition   Stable    Date/Time   Mon Mar 24, 2025  3:50 PM    Comment   Case was discussed with JANIA and the patient's admission status was agreed to be Admission Status: observation status to the service of Dr. Silver .               Assessment & Plan       Medical Decision Making  Patient with numerous previous evaluations for dialysis noncompliance, frequently hyperkalemic.  I ordered and reviewed lab work including CBC, CMP to evaluate for electrolyte or renal derangement, leukocytosis, anemia.  I ordered and independently interpreted an EKG.  Given patient's fall, left shoulder pain, left hip pain, I ordered and independently interpreted plain films of the left hip and shoulder.  I additionally ordered and reviewed a CT lumbar spine to evaluate for traumatic injury after fall.  I treated patient with acetaminophen, Lidoderm patches.  Patient with some continued pain ultimately requiring Dilaudid.  Plain films atraumatic but CT demonstrating lumbar fracture.  Patient placed in LSO brace. Noted to have a potassium of 6.5.  No EKG changes.  I consulted nephrology who recommends admission for dialysis, temporizing medications for hyperkalemia.  Patient treated with calcium gluconate, insulin, heart neb.  I discussed patient with the hospitalist and admitted patient for further evaluation and  management.    Amount and/or Complexity of Data Reviewed  Labs: ordered.  Radiology: ordered.    Risk  OTC drugs.  Prescription drug management.  Decision regarding hospitalization.    Critical Care Time Statement: Upon my evaluation, this patient had a high probability of imminent or life-threatening deterioration due to hyperkalemia, which required my direct attention, intervention, and personal management.  I spent a total of 42 minutes directly providing critical care services, including interpretation of complex medical databases, evaluating for the presence of life-threatening injuries or illnesses, management of organ system failure(s) , complex medical decision making (to support/prevent further life-threatening deterioration)., and interpretation of hemodynamic data. This time is exclusive of procedures, teaching, treating other patients, family meetings, and any prior time recorded by providers other than myself.            Medications   lidocaine (LIDODERM) 5 % patch 1 patch (1 patch Topical Medication Applied 3/24/25 1440)   insulin regular (HumuLIN R,NovoLIN R) injection 10 Units (has no administration in time range)   dextrose 50 % IV solution 25 mL (has no administration in time range)   calcium gluconate 1 g in sodium chloride 0.9% 50 mL (premix) (has no administration in time range)   sodium bicarbonate 8.4 % injection 50 mEq (has no administration in time range)   albuterol inhalation solution 10 mg (has no administration in time range)   acetaminophen (TYLENOL) tablet 975 mg (975 mg Oral Given 3/24/25 1437)   HYDROmorphone (DILAUDID) injection 0.5 mg (0.5 mg Intravenous Given 3/24/25 1538)       ED Risk Strat Scores                            SBIRT 20yo+      Flowsheet Row Most Recent Value   Initial Alcohol Screen: US AUDIT-C     1. How often do you have a drink containing alcohol? 0 Filed at: 03/24/2025 1410   2. How many drinks containing alcohol do you have on a typical day you are drinking?   0 Filed at: 03/24/2025 1410   3a. Male UNDER 65: How often do you have five or more drinks on one occasion? 0 Filed at: 03/24/2025 1410   3b. FEMALE Any Age, or MALE 65+: How often do you have 4 or more drinks on one occassion? 0 Filed at: 03/24/2025 1410   Audit-C Score 0 Filed at: 03/24/2025 1410   NICOLÁS: How many times in the past year have you...    Used an illegal drug or used a prescription medication for non-medical reasons? Never Filed at: 03/24/2025 1410                            History of Present Illness       Chief Complaint   Patient presents with    Fall     Pt felt weak in legs after PT. Pt fell in kitchen while drinking tea. Pt crawled over broken glass receiving lacs to feet and knees. 7/10 pain to left shoulder and lower back. Denies strike to head, no LOC, -thinners. Hx dialysis, DM.       Past Medical History:   Diagnosis Date    Acute cystitis 10/18/2024    Acute on chronic anemia 01/23/2022    Atrial fibrillation (HCC)     Bradycardia 09/05/2023    Diabetes mellitus (HCC)     ESRD (end stage renal disease) on dialysis (HCC)     Hematuria 10/10/2024    Hematuria 10/10/2024    Hyperkalemia 04/06/2024    Hyperkalemia 04/06/2024    Hyperlipidemia     Hypertension     Left toe amputee (HCC)     TIA (transient ischemic attack)     Toxic metabolic encephalopathy 10/08/2024      Past Surgical History:   Procedure Laterality Date    AMPUTATION Left     left foot resection 10 years ago dr tran    HEMODIALYSIS ADULT  1/18/2025    HEMODIALYSIS ADULT  2/27/2025    IR LOWER EXTREMITY ANGIOGRAM  08/29/2023    IR TEMPORARY DIALYSIS CATHETER PLACEMENT  08/25/2023    IR TUNNELED CENTRAL LINE REMOVAL  08/23/2023    IR TUNNELED DIALYSIS CATHETER PLACEMENT  01/27/2022    IR TUNNELED DIALYSIS CATHETER PLACEMENT  08/30/2023    VT AMPUTATION METATARSAL W/TOE SINGLE Right 8/31/2023    Procedure: RIGHT PARTIAL 1ST RAY RESECTION WITH  WOUND VAC APPLICATION;  Surgeon: Won Parmar DPM;  Location: [facility];   Service: Podiatry      No family history on file.   Social History     Tobacco Use    Smoking status: Never     Passive exposure: Never    Smokeless tobacco: Never   Vaping Use    Vaping status: Never Used   Substance Use Topics    Alcohol use: Not Currently     Alcohol/week: 0.0 standard drinks of alcohol     Comment: 0    Drug use: Never      E-Cigarette/Vaping    E-Cigarette Use Never User       E-Cigarette/Vaping Substances    Nicotine No     THC No     CBD No     Flavoring No     Other No     Unknown No       I have reviewed and agree with the history as documented.     Patient is a 64-year-old male history of end-stage renal disease on Tuesday Thursday Saturday dialysis presenting for evaluation of weakness, fall.  Patient with history of poor compliance with dialysis, states that he missed Tuesday and Thursday sessions last week due to diarrhea but was able to complete a Saturday session.  Patient states earlier today he was trying to do home PT when he felt weak, and fell to the ground onto his left side.  Patient denies striking his head, denies loss of consciousness.  Patient denies anticoagulation use however home medications list Eliquis.  Patient additionally on a baby aspirin.  Patient complaining of moderate pain of the left hip, left shoulder, and lower back.  Patient additionally states that he knocked over teacup and had to crawl through glass to try to get up, noted to have some abrasions of his lower legs and feet.  Patient denies fevers, chills, chest pain, shortness of breath.        Review of Systems   Constitutional:  Negative for chills, fatigue and fever.   Respiratory:  Negative for cough and shortness of breath.    Gastrointestinal:  Negative for diarrhea, nausea and vomiting.   Musculoskeletal:  Positive for back pain. Negative for arthralgias and myalgias.   Skin:  Positive for wound. Negative for color change, pallor and rash.   Neurological:  Negative for weakness, numbness and  headaches.   Psychiatric/Behavioral:  Negative for confusion.    All other systems reviewed and are negative.          Objective       ED Triage Vitals [03/24/25 1407]   Temperature Pulse Blood Pressure Respirations SpO2 Patient Position - Orthostatic VS   97.8 °F (36.6 °C) 55 152/70 18 97 % Lying      Temp Source Heart Rate Source BP Location FiO2 (%) Pain Score    Oral Monitor Right arm -- 7      Vitals      Date and Time Temp Pulse SpO2 Resp BP Pain Score FACES Pain Rating User   03/24/25 1538 -- -- -- -- -- 9 -- JG   03/24/25 1437 -- -- -- -- -- 7 -- JG   03/24/25 1407 97.8 °F (36.6 °C) 55 97 % 18 152/70 7 -- JG            Physical Exam  Vitals and nursing note reviewed.   Constitutional:       General: He is not in acute distress.     Appearance: Normal appearance. He is not ill-appearing, toxic-appearing or diaphoretic.      Comments: Chronically ill-appearing   HENT:      Head: Normocephalic and atraumatic.      Comments: No external signs of trauma.  No scalp hematoma.  Moist mucous membranes     Right Ear: External ear normal.      Left Ear: External ear normal.   Eyes:      General:         Right eye: No discharge.         Left eye: No discharge.   Cardiovascular:      Comments: Regular rate and rhythm, no murmurs rubs or gallops.  Extremities warm and well-perfused without mottling  Pulmonary:      Effort: No respiratory distress.      Comments: No increased work of breathing.  Speaking in complete sentences.  Lungs clear to auscultation bilaterally without wheezes, rales, rhonchi.  Satting 97% on room air indicating adequate oxygenation  Abdominal:      General: There is no distension.      Comments: Abdomen nondistended with normal bowel sounds, nontender without rigidity, rebound, guarding   Musculoskeletal:         General: No deformity.      Cervical back: Normal range of motion.   Skin:     Findings: No lesion or rash.   Neurological:      Mental Status: He is alert and oriented to person, place,  and time. Mental status is at baseline.      Comments: Awake, alert, pleasant, interactive.  Patient with intermittent jerk and verbal tic.  Moving all extremities equally.  Full strength and sensation bilaterally in upper and lower extremities   Psychiatric:         Mood and Affect: Mood and affect normal.         Results Reviewed       Procedure Component Value Units Date/Time    Comprehensive metabolic panel [[id number]]  (Abnormal) Collected: 03/24/25 1434    Lab Status: Final result Specimen: Blood from Arm, Right Updated: 03/24/25 1503     Sodium 133 mmol/L      Potassium 6.5 mmol/L      Chloride 100 mmol/L      CO2 19 mmol/L      ANION GAP 14 mmol/L      BUN 95 mg/dL      Creatinine 10.44 mg/dL      Glucose 169 mg/dL      Calcium 7.6 mg/dL      AST 13 U/L      ALT 17 U/L      Alkaline Phosphatase 94 U/L      Total Protein 7.3 g/dL      Albumin 3.9 g/dL      Total Bilirubin 0.42 mg/dL      eGFR 4 ml/min/1.73sq m     Narrative:      National Kid[employer] Foundation guidelines for Chronic Kidney Disease (CKD):     Stage 1 with normal or high GFR (GFR > 90 mL/min/1.73 square meters)    Stage 2 Mild CKD (GFR = 60-89 mL/min/1.73 square meters)    Stage 3A Moderate CKD (GFR = 45-59 mL/min/1.73 square meters)    Stage 3B Moderate CKD (GFR = 30-44 mL/min/1.73 square meters)    Stage 4 Severe CKD (GFR = 15-29 mL/min/1.73 square meters)    Stage 5 End Stage CKD (GFR <15 mL/min/1.73 square meters)  Note: GFR calculation is accurate only with a steady state creatinine    CBC and differential [[id number]]  (Abnormal) Collected: 03/24/25 1434    Lab Status: Final result Specimen: Blood from Arm, Right Updated: 03/24/25 1439     WBC 10.13 Thousand/uL      RBC 3.54 Million/uL      Hemoglobin 10.3 g/dL      Hematocrit 33.7 %      MCV 95 fL      MCH 29.1 pg      MCHC 30.6 g/dL      RDW 17.8 %      MPV 10.4 fL      Platelets 182 Thousands/uL      nRBC 0 /100 WBCs      Segmented % 79 %      Immature Grans % 1 %      Lymphocytes %  6 %      Monocytes % 11 %      Eosinophils Relative 3 %      Basophils Relative 0 %      Absolute Neutrophils 7.92 Thousands/µL      Absolute Immature Grans 0.10 Thousand/uL      Absolute Lymphocytes 0.65 Thousands/µL      Absolute Monocytes 1.10 Thousand/µL      Eosinophils Absolute 0.32 Thousand/µL      Basophils Absolute 0.04 Thousands/µL     Fingerstick Glucose (POCT) [[id number]]  (Abnormal) Collected: 03/24/25 1421    Lab Status: Final result Specimen: Blood Updated: 03/24/25 1422     POC Glucose 182 mg/dl             CT spine lumbar without contrast   Final Interpretation by Rishabh Wagner MD (03/24 1537)      Acute fracture of the L2 vertebral body.         I personally discussed this study with PAGE CARNES KATT on 3/24/2025 3:36 PM.            Workstation performed: [id number]         CT head without contrast   Final Interpretation by Rishabh Wagner MD (03/24 1527)      No acute intracranial abnormality.                  Workstation performed: [id number]         CT abdomen pelvis wo contrast   Final Interpretation by Rishabh Wagner MD (03/24 1540)      1.  Acute fracture of the L2 vertebral body.   2.  At least small right pleural effusion. Groundglass opacities in the right lower lobe is likely atelectasis. Pneumonia is to be determined on clinical grounds.      I personally discussed this study with PAGE BOLIVAR on 3/24/2025 3:36 PM.            Workstation performed: [id number]         XR chest 1 view portable   Final Interpretation by Shola Montiel MD (03/24 5882)      No acute cardiopulmonary disease.            Workstation performed: [id number]         XR shoulder 2+ views LEFT   Final Interpretation by Shola Montiel MD (03/24 0215)   Previously noted avulsion fracture better appreciated on prior study.      Degenerative changes as described.         Computerized Assisted Algorithm (CAA) may have been used to analyze all applicable images.         Workstation performed: [id number]          XR hip/pelv 2-3 vws left if performed   Final Interpretation by Shola Montiel MD (03/24 1516)      No acute osseous abnormality. Chronic right pubic ramus fractures noted.      Degenerative changes as described.         Computerized Assisted Algorithm (CAA) may have aided analysis of applicable images.         Workstation performed: [id number]             Procedures    ED Medication and Procedure Management   Prior to Admission Medications   Prescriptions Last Dose Informant Patient Reported? Taking?   NIFEdipine (PROCARDIA XL) 30 mg 24 hr tablet   No No   Sig: Take 1 tablet (30 mg total) by mouth daily after dinner   allopurinol (ZYLOPRIM) 100 mg tablet  Self No No   Sig: Take 1 tablet (100 mg total) by mouth daily   apixaban (Eliquis) 5 mg   No No   Sig: Take 1 tablet (5 mg total) by mouth 2 (two) times a day   aspirin 81 mg chewable tablet   No No   Sig: Chew 1 tablet (81 mg total) daily   atorvastatin (LIPITOR) 80 mg tablet   Yes No   Sig: Take 80 mg by mouth daily   dextromethorphan-guaiFENesin (ROBITUSSIN DM)  mg/5 mL oral syrup   No No   Sig: Take 10 mL by mouth every 4 (four) hours as needed (cough)   gabapentin (NEURONTIN) 100 mg capsule   No No   Sig: Take 2 capsules (200 mg total) by mouth 2 (two) times a day   sevelamer (RENAGEL) 800 mg tablet  Self No No   Sig: Take 1 tablet (800 mg total) by mouth 3 (three) times a day with meals      Facility-Administered Medications: None     Patient's Medications   Discharge Prescriptions    No medications on file     No discharge procedures on file.  ED SEPSIS DOCUMENTATION   Time reflects when diagnosis was documented in both MDM as applicable and the Disposition within this note       Time User Action Codes Description Comment    3/24/2025  3:44 PM Solomon Davis [S32.029A] L2 vertebral fracture (HCC)     3/24/2025  3:44 PM Solomon Davis Add [E87.5] Hyperkalemia     3/24/2025  3:44 PM Solomon Davis Add [N18.6,  Z99.2] ESRD on  dialysis ([facility])     3/24/2025  3:50 PM Solomon Davis Modify [S32.029A] L2 vertebral fracture ([facility])     3/24/2025  3:50 PM Solomon Davis Modify [E87.5] Hyperkalemia                  Solomon Davis MD  03/24/25 1554

## 2025-03-24 NOTE — ASSESSMENT & PLAN NOTE
Lab Results   Component Value Date    EGFR 4 03/24/2025    EGFR 5 02/27/2025    EGFR 4 02/25/2025    CREATININE 10.44 (H) 03/24/2025    CREATININE 8.99 (H) 02/27/2025    CREATININE 11.06 (H) 02/25/2025

## 2025-03-24 NOTE — ASSESSMENT & PLAN NOTE
Hyperkalemia: Has not been eating more than usual of high potassium foods.  Likely related to missed treatments.  Only had 1 hemodialysis treatment last week  Plan for low K bath on dialysis today.  2K bath for first hour, 1K bath for second hour  Temporizing treatment  Hemodialysis tomorrow.  Adjust K bath as needed  Low bicarbonate in the setting of missed hemodialysis treatment

## 2025-03-24 NOTE — ASSESSMENT & PLAN NOTE
Lab Results   Component Value Date    EGFR 4 03/24/2025    EGFR 5 02/27/2025    EGFR 4 02/25/2025    CREATININE 10.44 (H) 03/24/2025    CREATININE 8.99 (H) 02/27/2025    CREATININE 11.06 (H) 02/25/2025   Outpatient dialysis TTS at [facility]  Access: Right IJ PermCath.  No swelling tenderness or redness noted in the area. Dressing to be changed during HD  Target weight 99 kg as of recent hospitalization  Patient missed 2 treatments last week.  He only had 1 treatment on Saturday.  Hyperkalemia, metabolic acidosis, questionable uremia versus gabapentin affect causing significant muscle twitches.  Will plan on hemodialysis treatment today for 2 hours.  We will run on 2K for the first hour and 1K the second hour  Next hemodialysis treatment tomorrow 3/25

## 2025-03-24 NOTE — ASSESSMENT & PLAN NOTE
Wt Readings from Last 3 Encounters:   02/27/25 102 kg (224 lb 13.9 oz)   02/18/25 107 kg (235 lb)   02/11/25 108 kg (237 lb)   Volume removal on dialysis  Chest x-ray: Lungs clear

## 2025-03-24 NOTE — H&P
H&P - Hospitalist   Name: Naresh Carpio 64 y.o. male I MRN: [id number]  Unit/Bed#: [facility] I Date of Admission: 3/24/2025   Date of Service: 3/24/2025 I Hospital Day: 0     Assessment & Plan  Closed fracture of second lumbar vertebra (HCC)  Patient presented to the ER after he fell in the kitchen on his side.  He could not get up and crawled over broken pieces of glass to get to his phone  CT scan shows acute L2 vertebral fracture  X-ray showed chronic right pubic ramus fracture  Pain control with Lidoderm patch, scheduled Tylenol, Robaxin and low-dose oxycodone as needed  LSO brace.  PT/OT  Hyperkalemia  Potassium 6.5.  Received medications in the ER for IT  Hemodialysis today per nephrology.  Low potassium diet  Repeat lab work in a.m.  ESRD (end stage renal disease) on dialysis ([facility])  Lab Results   Component Value Date    EGFR 4 03/24/2025    EGFR 5 02/27/2025    EGFR 4 02/25/2025    CREATININE 10.44 (H) 03/24/2025    CREATININE 8.99 (H) 02/27/2025    CREATININE 11.06 (H) 02/25/2025     TTS schedule as  outpatient  Noncompliant with dialysis sessions.  Missed dialysis on Tuesday and Thursday date ago on Saturday  Nephrology contacted by ER and plan for dialysis today and again in a.m.  Repeat BMP  Type 2 diabetes mellitus, without long-term current use of insulin ([facility])  Lab Results   Component Value Date    HGBA1C 5.7 (H) 02/09/2025       Recent Labs     03/24/25  1421   POCGLU 182*     Not on medications at home.  A1c well-controlled  Hypertension  Continue Procardia XL.  Monitor blood pressures  Diabetic polyneuropathy associated with type 2 diabetes mellitus (HCC)  Decrease gabapentin dose to 100 mg 3 times a week after dialysis his current dosage is too high  Paroxysmal atrial fibrillation (HCC)  Continue nifedipine, restart Eliquis  Chronic diastolic (congestive) heart failure (HCC)  Wt Readings from Last 3 Encounters:   02/27/25 102 kg (224 lb 13.9 oz)   02/18/25 107 kg (235 lb)   02/11/25 108 kg (237 lb)      Being managed with dialysis  Chest x-ray negative for acute pathology  Anemia in ESRD (end-stage renal disease)  (HCC)  Hemoglobin stable.  Repeat lab work in a.m.  Results from last 7 days   Lab Units 03/24/25  1434   HEMOGLOBIN g/dL 10.3*   HEMATOCRIT % 33.7*   MCV fL 95      Abnormal involuntary movement  Patient with twitching/tremors and some speech difficulty.  Per review of records had similar findings in March 2024 as well  Unclear if some of this is at baseline.  Possibly related to uremia versus high-dose gabapentin  Monitor for now.  CT head negative for acute pathology  Non-compliance with treatment  Patient noncompliant with dialysis sessions and states he stopped taking all of his medications except for nifedipine for about 1 month  Discussed with patient to be compliant with medication/dialysis      VTE Pharmacologic Prophylaxis:    Eliquis  Code Status: Level 1 - Full Code   Discussion with family: Patient declined call to .     Anticipated Length of Stay: Patient will be admitted on an observation basis with an anticipated length of stay of less than 2 midnights secondary to L2 vertebral fracture, hyperkalemia.    History of Present Illness   Chief Complaint: Fall, back pain    Naresh Carpio is a 64 y.o. male with a PMH of ESRD, diabetes who presents after a fall in the kitchen where he fell onto his side.  Patient could not get up and crawled over broken pieces of glass to get to the phone and called his sister and EMS.  Patient states pain is mostly in the lower back and some in the left shoulder.  Denies any loss of consciousness or head trauma.  Patient had dialysis on Saturday but missed Tuesday and Thursday due to diarrhea which has since resolved.  Patient also reports not taking any of his medications except for nifedipine for at least 1 month    Review of Systems   Constitutional:  Negative for appetite change, chills and fever.   HENT:  Negative for congestion and  trouble swallowing.    Eyes:  Negative for visual disturbance.   Respiratory:  Negative for chest tightness and shortness of breath.    Cardiovascular:  Negative for chest pain.   Gastrointestinal:  Negative for abdominal pain, diarrhea, nausea and vomiting.   Genitourinary:  Negative for dysuria and hematuria.   Musculoskeletal:  Positive for back pain.   Skin:  Negative for color change.   Neurological:  Negative for light-headedness.   Hematological:  Does not bruise/bleed easily.   Psychiatric/Behavioral:  Negative for agitation.        Historical Information   Past Medical History:   Diagnosis Date    Acute cystitis 10/18/2024    Acute on chronic anemia 01/23/2022    Atrial fibrillation (HCC)     Bradycardia 09/05/2023    Diabetes mellitus (HCC)     ESRD (end stage renal disease) on dialysis (HCC)     Hematuria 10/10/2024    Hematuria 10/10/2024    Hyperkalemia 04/06/2024    Hyperkalemia 04/06/2024    Hyperlipidemia     Hypertension     Left toe amputee (HCC)     TIA (transient ischemic attack)     Toxic metabolic encephalopathy 10/08/2024     Past Surgical History:   Procedure Laterality Date    AMPUTATION Left     left foot resection 10 years ago dr tran    HEMODIALYSIS ADULT  1/18/2025    HEMODIALYSIS ADULT  2/27/2025    IR LOWER EXTREMITY ANGIOGRAM  08/29/2023    IR TEMPORARY DIALYSIS CATHETER PLACEMENT  08/25/2023    IR TUNNELED CENTRAL LINE REMOVAL  08/23/2023    IR TUNNELED DIALYSIS CATHETER PLACEMENT  01/27/2022    IR TUNNELED DIALYSIS CATHETER PLACEMENT  08/30/2023    CT AMPUTATION METATARSAL W/TOE SINGLE Right 8/31/2023    Procedure: RIGHT PARTIAL 1ST RAY RESECTION WITH  WOUND VAC APPLICATION;  Surgeon: Won Parmar DPM;  Location: [facility];  Service: Podiatry     Social History     Tobacco Use    Smoking status: Never     Passive exposure: Never    Smokeless tobacco: Never   Vaping Use    Vaping status: Never Used   Substance and Sexual Activity    Alcohol use: Not Currently     Alcohol/week:  0.0 standard drinks of alcohol     Comment: 0    Drug use: Never    Sexual activity: Not on file     E-Cigarette/Vaping    E-Cigarette Use Never User      E-Cigarette/Vaping Substances    Nicotine No     THC No     CBD No     Flavoring No     Other No     Unknown No      No family history on file.  Social History:  Marital Status:    Occupation: none  Patient Pre-hospital Living Situation: sister lives with him  Patient Pre-hospital Level of Mobility:  uses walker outside of the house  Patient Pre-hospital Diet Restrictions: none    Meds/Allergies   I have reviewed home medications using recent Epic encounter.  Prior to Admission medications    Medication Sig Start Date End Date Taking? Authorizing Provider   allopurinol (ZYLOPRIM) 100 mg tablet Take 1 tablet (100 mg total) by mouth daily 3/22/24   Lore Silver DO   apixaban (Eliquis) 5 mg Take 1 tablet (5 mg total) by mouth 2 (two) times a day 2/10/25   Nedra Shetty MD   aspirin 81 mg chewable tablet Chew 1 tablet (81 mg total) daily 10/23/24   Mir Salinas MD   atorvastatin (LIPITOR) 80 mg tablet Take 80 mg by mouth daily 11/2/21   Historical Provider, MD   dextromethorphan-guaiFENesin (ROBITUSSIN DM)  mg/5 mL oral syrup Take 10 mL by mouth every 4 (four) hours as needed (cough) 2/27/25   Joel Dalton MD   gabapentin (NEURONTIN) 100 mg capsule Take 2 capsules (200 mg total) by mouth 2 (two) times a day 3/22/24 11/19/24  Lore Silver DO   NIFEdipine (PROCARDIA XL) 30 mg 24 hr tablet Take 1 tablet (30 mg total) by mouth daily after dinner 12/2/24   Nallely Pisano PA-C   sevelamer (RENAGEL) 800 mg tablet Take 1 tablet (800 mg total) by mouth 3 (three) times a day with meals 2/2/22   Lore Silver DO     No Known Allergies    Objective :  Temp:  [97.8 °F (36.6 °C)] 97.8 °F (36.6 °C)  HR:  [55] 55  BP: (152)/(70) 152/70  Resp:  [18] 18  SpO2:  [97 %] 97 %  O2 Device: None (Room air)    Physical Exam  Vitals reviewed.    Constitutional:       General: He is not in acute distress.     Appearance: He is ill-appearing (chronically).   HENT:      Head: Normocephalic and atraumatic.   Eyes:      General:         Right eye: No discharge.         Left eye: No discharge.   Cardiovascular:      Rate and Rhythm: Normal rate and regular rhythm.   Pulmonary:      Effort: Pulmonary effort is normal. No respiratory distress.      Breath sounds: No wheezing or rales.      Comments: Decreased breath sounds bilaterally  Abdominal:      General: Bowel sounds are normal. There is no distension.      Palpations: Abdomen is soft.      Tenderness: There is no abdominal tenderness.   Musculoskeletal:      Right lower leg: Edema present.      Left lower leg: Edema present.      Comments: Blood noted on the feet. Toe amputations noted   Neurological:      Mental Status: He is alert and oriented to person, place, and time.      Comments: Twitching/tremors noted of arms/legs         Lines/Drains:            Lab Results: I have reviewed the following results:  Results from last 7 days   Lab Units 03/24/25  1434   WBC Thousand/uL 10.13   HEMOGLOBIN g/dL 10.3*   HEMATOCRIT % 33.7*   PLATELETS Thousands/uL 182   SEGS PCT % 79*   LYMPHO PCT % 6*   MONO PCT % 11   EOS PCT % 3     Results from last 7 days   Lab Units 03/24/25  1434   SODIUM mmol/L 133*   POTASSIUM mmol/L 6.5*   CHLORIDE mmol/L 100   CO2 mmol/L 19*   BUN mg/dL 95*   CREATININE mg/dL 10.44*   ANION GAP mmol/L 14*   CALCIUM mg/dL 7.6*   ALBUMIN g/dL 3.9   TOTAL BILIRUBIN mg/dL 0.42   ALK PHOS U/L 94   ALT U/L 17   AST U/L 13   GLUCOSE RANDOM mg/dL 169*         Results from last 7 days   Lab Units 03/24/25  1421   POC GLUCOSE mg/dl 182*     Lab Results   Component Value Date    HGBA1C 5.7 (H) 02/09/2025    HGBA1C 5.5 10/08/2024    HGBA1C 6.2 (H) 07/02/2024           Imaging Results Review: I reviewed radiology reports from this admission including: chest xray, CT abdomen/pelvis, CT head, and  xray(s).  Other Study Results Review: EKG was reviewed.     Administrative Statements     ** Please Note: This note has been constructed using a voice recognition system. **

## 2025-03-24 NOTE — ASSESSMENT & PLAN NOTE
Frequent muscle twitching.  Uremia versus effect of gabapentin.  Patient reports taking gabapentin twice a day which according to his home doses 200 mg twice a day but states he took an extra dose today  Patient only had 1 hemodialysis treatment last week.   50% of gabapentin is dialyzed over a 4-hour period  Total milligram dose of 400 mg daily is excessive for dialysis patient.  Max dose 300 mg after dialysis  Recommendation: Reduce gabapentin dose to 100 mg after dialysis

## 2025-03-24 NOTE — ASSESSMENT & PLAN NOTE
Lab Results   Component Value Date    EGFR 4 03/24/2025    EGFR 5 02/27/2025    EGFR 4 02/25/2025    CREATININE 10.44 (H) 03/24/2025    CREATININE 8.99 (H) 02/27/2025    CREATININE 11.06 (H) 02/25/2025     TTS schedule as  outpatient  Noncompliant with dialysis sessions.  Missed dialysis on Tuesday and Thursday date ago on Saturday  Nephrology contacted by ER and plan for dialysis today and again in a.m.  Repeat BMP

## 2025-03-24 NOTE — ASSESSMENT & PLAN NOTE
Lab Results   Component Value Date    HGBA1C 5.7 (H) 02/09/2025       Recent Labs     03/24/25  1421   POCGLU 182*     Not on medications at home.  A1c well-controlled

## 2025-03-25 ENCOUNTER — APPOINTMENT (INPATIENT)
Dept: DIALYSIS | Facility: HOSPITAL | Age: 65
DRG: 463 | End: 2025-03-25
Payer: MEDICARE

## 2025-03-25 PROBLEM — E87.8 DISORDERS OF FLUID, ELECTROLYTE, AND ACID-BASE BALANCE: Status: RESOLVED | Noted: 2025-03-24 | Resolved: 2025-03-25

## 2025-03-25 LAB
ALBUMIN SERPL BCG-MCNC: 3.5 G/DL (ref 3.5–5)
ANION GAP SERPL CALCULATED.3IONS-SCNC: 14 MMOL/L (ref 4–13)
BUN SERPL-MCNC: 65 MG/DL (ref 5–25)
CALCIUM SERPL-MCNC: 7.8 MG/DL (ref 8.4–10.2)
CHLORIDE SERPL-SCNC: 97 MMOL/L (ref 96–108)
CO2 SERPL-SCNC: 21 MMOL/L (ref 21–32)
CREAT SERPL-MCNC: 8.11 MG/DL (ref 0.6–1.3)
ERYTHROCYTE [DISTWIDTH] IN BLOOD BY AUTOMATED COUNT: 17.8 % (ref 11.6–15.1)
GFR SERPL CREATININE-BSD FRML MDRD: 6 ML/MIN/1.73SQ M
GLUCOSE P FAST SERPL-MCNC: 118 MG/DL (ref 65–99)
GLUCOSE SERPL-MCNC: 118 MG/DL (ref 65–140)
HCT VFR BLD AUTO: 31.1 % (ref 36.5–49.3)
HGB BLD-MCNC: 9.5 G/DL (ref 12–17)
MCH RBC QN AUTO: 29.1 PG (ref 26.8–34.3)
MCHC RBC AUTO-ENTMCNC: 30.5 G/DL (ref 31.4–37.4)
MCV RBC AUTO: 95 FL (ref 82–98)
PHOSPHATE SERPL-MCNC: 6.3 MG/DL (ref 2.3–4.1)
PLATELET # BLD AUTO: 178 THOUSANDS/UL (ref 149–390)
PMV BLD AUTO: 10.4 FL (ref 8.9–12.7)
POTASSIUM SERPL-SCNC: 5.4 MMOL/L (ref 3.5–5.3)
RBC # BLD AUTO: 3.26 MILLION/UL (ref 3.88–5.62)
SODIUM SERPL-SCNC: 132 MMOL/L (ref 135–147)
WBC # BLD AUTO: 6.98 THOUSAND/UL (ref 4.31–10.16)

## 2025-03-25 PROCEDURE — 99232 SBSQ HOSP IP/OBS MODERATE 35: CPT

## 2025-03-25 PROCEDURE — 80069 RENAL FUNCTION PANEL: CPT | Performed by: FAMILY MEDICINE

## 2025-03-25 PROCEDURE — 87081 CULTURE SCREEN ONLY: CPT | Performed by: FAMILY MEDICINE

## 2025-03-25 PROCEDURE — 97163 PT EVAL HIGH COMPLEX 45 MIN: CPT

## 2025-03-25 PROCEDURE — 99214 OFFICE O/P EST MOD 30 MIN: CPT | Performed by: INTERNAL MEDICINE

## 2025-03-25 PROCEDURE — 5A1D70Z PERFORMANCE OF URINARY FILTRATION, INTERMITTENT, LESS THAN 6 HOURS PER DAY: ICD-10-PCS | Performed by: INTERNAL MEDICINE

## 2025-03-25 PROCEDURE — 85027 COMPLETE CBC AUTOMATED: CPT | Performed by: FAMILY MEDICINE

## 2025-03-25 PROCEDURE — 97760 ORTHOTIC MGMT&TRAING 1ST ENC: CPT

## 2025-03-25 RX ORDER — HYDROMORPHONE HCL/PF 1 MG/ML
0.5 SYRINGE (ML) INJECTION EVERY 6 HOURS PRN
Status: DISCONTINUED | OUTPATIENT
Start: 2025-03-25 | End: 2025-03-29 | Stop reason: HOSPADM

## 2025-03-25 RX ORDER — HYDROMORPHONE HCL IN WATER/PF 6 MG/30 ML
0.2 PATIENT CONTROLLED ANALGESIA SYRINGE INTRAVENOUS EVERY 6 HOURS PRN
Status: DISCONTINUED | OUTPATIENT
Start: 2025-03-25 | End: 2025-03-25

## 2025-03-25 RX ADMIN — SEVELAMER HYDROCHLORIDE 800 MG: 800 TABLET, FILM COATED ORAL at 12:55

## 2025-03-25 RX ADMIN — HYDROMORPHONE HYDROCHLORIDE 0.5 MG: 1 INJECTION, SOLUTION INTRAMUSCULAR; INTRAVENOUS; SUBCUTANEOUS at 15:20

## 2025-03-25 RX ADMIN — ASPIRIN 81 MG CHEWABLE TABLET 81 MG: 81 TABLET CHEWABLE at 08:52

## 2025-03-25 RX ADMIN — HYDROMORPHONE HYDROCHLORIDE 0.5 MG: 1 INJECTION, SOLUTION INTRAMUSCULAR; INTRAVENOUS; SUBCUTANEOUS at 04:14

## 2025-03-25 RX ADMIN — APIXABAN 5 MG: 5 TABLET, FILM COATED ORAL at 17:27

## 2025-03-25 RX ADMIN — Medication 2.5 MG: at 19:47

## 2025-03-25 RX ADMIN — ACETAMINOPHEN 975 MG: 325 TABLET ORAL at 05:32

## 2025-03-25 RX ADMIN — HYDROMORPHONE HYDROCHLORIDE 0.2 MG: 0.2 INJECTION, SOLUTION INTRAMUSCULAR; INTRAVENOUS; SUBCUTANEOUS at 08:53

## 2025-03-25 RX ADMIN — METHOCARBAMOL 500 MG: 500 TABLET ORAL at 09:48

## 2025-03-25 RX ADMIN — METHOCARBAMOL 500 MG: 500 TABLET ORAL at 01:41

## 2025-03-25 RX ADMIN — APIXABAN 5 MG: 5 TABLET, FILM COATED ORAL at 08:52

## 2025-03-25 RX ADMIN — NIFEDIPINE 30 MG: 30 TABLET, EXTENDED RELEASE ORAL at 17:27

## 2025-03-25 RX ADMIN — Medication 2.5 MG: at 11:09

## 2025-03-25 RX ADMIN — Medication 2.5 MG: at 02:52

## 2025-03-25 RX ADMIN — SEVELAMER HYDROCHLORIDE 800 MG: 800 TABLET, FILM COATED ORAL at 08:51

## 2025-03-25 RX ADMIN — SEVELAMER HYDROCHLORIDE 800 MG: 800 TABLET, FILM COATED ORAL at 17:27

## 2025-03-25 RX ADMIN — ALLOPURINOL 100 MG: 100 TABLET ORAL at 08:51

## 2025-03-25 RX ADMIN — ACETAMINOPHEN 975 MG: 325 TABLET ORAL at 21:04

## 2025-03-25 RX ADMIN — GABAPENTIN 100 MG: 100 CAPSULE ORAL at 12:55

## 2025-03-25 RX ADMIN — ATORVASTATIN CALCIUM 80 MG: 80 TABLET, FILM COATED ORAL at 17:27

## 2025-03-25 NOTE — PROGRESS NOTES
NEPHROLOGY HOSPITAL PROGRESS NOTE   Naresh Carpio 64 y.o. male MRN: [id number]  Unit/Bed#: [address] [place] [address] Encounter: [id number]  Reason for Consult: ESRD on HD  Assessment & Plan  ESRD (end stage renal disease) on dialysis (HCC)  TTS at [facility] in [place]  Access: Right IJ permacath  Status post urgent HD yesterday for hyperkalemia  Regular session of hemodialysis today  Hypertension  Continue home medications  Anemia in ESRD (end-stage renal disease)  ([facility])  Hemoglobin 9.5, close to goal  Not on SURINDER as outpatient  Abnormal involuntary movement  This may be related to gabapentin use  Hopeful improvement with HD  Dose reduce gabapentin for HD  Agree with current gabapentin dose  Type 2 diabetes mellitus, without long-term current use of insulin ([facility])  Management per primary team  Hyponatremia  Due to lack of free water clearance  UF with HD  138 mEq bath on HD  Chronic diastolic (congestive) heart failure (HCC)  UF with HD  Paroxysmal atrial fibrillation (HCC)  Management per primary team   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, on chronic dialysis (HCC)  Continue sevelamer for hyperphosphatemia  Hyperkalemia  Serum potassium level today 5.4  2K bath on HD  Closed fracture of second lumbar vertebra ([facility])  Management per primary team  Non-compliance with treatment  Noted    I have reviewed the nephrology recommendations including regular session of hemodialysis and do not exceed current dose of gabapentin, with internal medicine team, and we are in agreement with renal plan including the information outlined above.    SUBJECTIVE / 24H INTERVAL HISTORY:  Complains of back pain.  Complains of shortness of breath.    OBJECTIVE:  Current Weight: Weight - Scale: 107 kg (234 lb 12.6 oz)  Vitals:    03/24/25 2200 03/24/25 2230 03/25/25 0220 03/25/25 0807   BP: 153/82 157/77 123/50 119/71   BP Location:  Right arm     Pulse: 92 85 81 73   Resp: 15 16 16    Temp:  97.7 °F (36.5  °C) (!) 97.2 °F (36.2 °C) 97.6 °F (36.4 °C)   TempSrc:  Oral     SpO2:  97% 96% 92%   Weight:  107 kg (234 lb 12.6 oz)     Height:           Intake/Output Summary (Last 24 hours) at 3/25/2025 0856  Last data filed at 3/25/2025 0501  Gross per 24 hour   Intake 500 ml   Output 3100 ml   Net -2600 ml     Review of Systems   Constitutional:  Negative for chills and fever.   HENT:  Negative for ear pain and sore throat.    Eyes:  Negative for pain and visual disturbance.   Respiratory:  Negative for cough and shortness of breath.    Cardiovascular:  Negative for chest pain and palpitations.   Gastrointestinal:  Negative for abdominal pain and vomiting.   Genitourinary:  Negative for dysuria and hematuria.   Musculoskeletal:  Positive for back pain. Negative for arthralgias.   Skin:  Negative for color change and rash.   Neurological:  Negative for seizures and syncope.   All other systems reviewed and are negative.    Physical Exam  Vitals and nursing note reviewed.   Constitutional:       General: He is not in acute distress.     Appearance: He is well-developed.   HENT:      Head: Normocephalic and atraumatic.   Eyes:      Conjunctiva/sclera: Conjunctivae normal.   Cardiovascular:      Rate and Rhythm: Normal rate and regular rhythm.      Pulses: Normal pulses.      Heart sounds: Normal heart sounds. No murmur heard.     Comments: Right chest wall permacath present  Pulmonary:      Effort: Pulmonary effort is normal. No respiratory distress.      Breath sounds: Normal breath sounds.   Abdominal:      Palpations: Abdomen is soft.      Tenderness: There is no abdominal tenderness.   Musculoskeletal:         General: No swelling.      Cervical back: Neck supple.      Right lower leg: Edema present.      Left lower leg: Edema present.   Skin:     General: Skin is warm and dry.      Capillary Refill: Capillary refill takes less than 2 seconds.   Neurological:      Mental Status: He is alert.   Psychiatric:         Mood and  Affect: Mood normal.         Medications:    Current Facility-Administered Medications:     acetaminophen (TYLENOL) tablet 975 mg, 975 mg, Oral, Q8H KEMAL, Lore Revankar, DO, 975 mg at 03/25/25 0532    allopurinol (ZYLOPRIM) tablet 100 mg, 100 mg, Oral, Daily, Lore Revankar, DO, 100 mg at 03/25/25 0851    apixaban (ELIQUIS) tablet 5 mg, 5 mg, Oral, BID, Lore Revankar, DO, 5 mg at 03/25/25 0852    aspirin chewable tablet 81 mg, 81 mg, Oral, Daily, Lore Revankar, DO, 81 mg at 03/25/25 0852    atorvastatin (LIPITOR) tablet 80 mg, 80 mg, Oral, Daily With Dinner, Lore Revankar, DO, 80 mg at 03/24/25 1849    gabapentin (NEURONTIN) capsule 100 mg, 100 mg, Oral, Once per day on Tuesday Thursday Saturday, Lore Revankar, DO    HYDROmorphone HCl (DILAUDID) injection 0.2 mg, 0.2 mg, Intravenous, Q6H PRN, Nallely Pisano PA-C, 0.2 mg at 03/25/25 0853    influenza vaccine, high-dose (Fluzone High-Dose) IM injection 0.5 mL, 0.5 mL, Intramuscular, Once, Lore Revankar, DO    lidocaine (LIDODERM) 5 % patch 1 patch, 1 patch, Topical, Daily, Lore Revankar, DO    methocarbamol (ROBAXIN) tablet 500 mg, 500 mg, Oral, Q6H PRN, Lore Revankar, DO, 500 mg at 03/25/25 0141    NIFEdipine (PROCARDIA XL) 24 hr tablet 30 mg, 30 mg, Oral, After Dinner, Lore Revankar, DO, 30 mg at 03/24/25 1849    oxyCODONE (ROXICODONE) split tablet 2.5 mg, 2.5 mg, Oral, Q8H PRN, Lore Revankar, DO, 2.5 mg at 03/25/25 0252    sevelamer (RENAGEL) tablet 800 mg, 800 mg, Oral, TID With Meals, Lore Revankar, DO, 800 mg at 03/25/25 0851    Laboratory Results:  Results from last 7 days   Lab Units 03/25/25  0531 03/24/25  1434   WBC Thousand/uL 6.98 10.13   HEMOGLOBIN g/dL 9.5* 10.3*   HEMATOCRIT % 31.1* 33.7*   PLATELETS Thousands/uL 178 182   POTASSIUM mmol/L 5.4* 6.5*   CHLORIDE mmol/L 97 100   CO2 mmol/L 21 19*   BUN mg/dL 65* 95*   CREATININE mg/dL 8.11* 10.44*   CALCIUM mg/dL 7.8* 7.6*   PHOSPHORUS mg/dL 6.3*  --        Portions of the record may  "have been created with voice recognition software. Occasional wrong word or \"sound a like\" substitutions may have occurred due to the inherent limitations of voice recognition software. Read the chart carefully and recognize, using context, where substitutions have occurred.If you have any questions, please contact the dictating provider.    "

## 2025-03-25 NOTE — ASSESSMENT & PLAN NOTE
Hemoglobin stable.  Repeat lab work in a.m.  Results from last 7 days   Lab Units 03/25/25  0531 03/24/25  1434   HEMOGLOBIN g/dL 9.5* 10.3*   HEMATOCRIT % 31.1* 33.7*   MCV fL 95 95

## 2025-03-25 NOTE — ASSESSMENT & PLAN NOTE
Patient noncompliant with dialysis sessions and states he stopped taking all of his medications except for nifedipine for about 1 month  Discussed with patient to be compliant with medication/dialysis

## 2025-03-25 NOTE — PLAN OF CARE
Problem: PAIN - ADULT  Goal: Verbalizes/displays adequate comfort level or baseline comfort level  Description: Interventions:- Encourage patient to monitor pain and request assistance- Assess pain using appropriate pain scale- Administer analgesics based on type and severity of pain and evaluate response- Implement non-pharmacological measures as appropriate and evaluate response- Consider cultural and social influences on pain and pain management- Notify physician/advanced practitioner if interventions unsuccessful or patient reports new pain  Outcome: Progressing     Problem: SAFETY ADULT  Goal: Patient will remain free of falls  Description: INTERVENTIONS:- Educate patient/family on patient safety including physical limitations- Instruct patient to call for assistance with activity - Consult OT/PT to assist with strengthening/mobility - Keep Call bell within reach- Keep bed low and locked with side rails adjusted as appropriate- Keep care items and personal belongings within reach- Initiate and maintain comfort rounds- Make Fall Risk Sign visible to staff- Offer Toileting every 2 hrs  Hours, in advance of need- Initiate/Maintain bed and chair alarm- Obtain necessary fall risk management equipment: bed and chair alarm/yellow socks and bracelet - Apply yellow socks and bracelet for high fall risk patients- Consider moving patient to room near nurses station  Outcome: Progressing  Goal: Maintains/Returns to pre admission functional level  Description: INTERVENTIONS:- Perform AM-PAC 6 Click Basic Mobility/ Daily Activity assessment daily.- Set and communicate daily mobility goal to care team and patient/family/caregiver. - Collaborate with rehabilitation services on mobility goals if consulted- Perform Range of Motion 3 times a day.- Reposition patient every 2 hours.- Dangle patient 3 times a day- Stand patient 3 times a day- Ambulate patient 3 times a day- Out of bed to chair 3 times a day - Out of bed for meals  3 times a day- Out of bed for toileting- Record patient progress and toleration of activity level   Outcome: Progressing     Problem: DISCHARGE PLANNING  Goal: Discharge to home or other facility with appropriate resources  Description: INTERVENTIONS:- Identify barriers to discharge w/patient and caregiver- Arrange for needed discharge resources and transportation as appropriate- Identify discharge learning needs (meds, wound care, etc.)- Arrange for interpretive services to assist at discharge as needed- Refer to Case Management Department for coordinating discharge planning if the patient needs post-hospital services based on physician/advanced practitioner order or complex needs related to functional status, cognitive ability, or social support system  Outcome: Progressing     Problem: Knowledge Deficit  Goal: Patient/family/caregiver demonstrates understanding of disease process, treatment plan, medications, and discharge instructions  Description: Complete learning assessment and assess knowledge base.Interventions:- Provide teaching at level of understanding- Provide teaching via preferred learning methods  Outcome: Progressing     Problem: METABOLIC, FLUID AND ELECTROLYTES - ADULT  Goal: Electrolytes maintained within normal limits  Description: INTERVENTIONS:- Monitor labs and assess patient for signs and symptoms of electrolyte imbalances- Administer electrolyte replacement as ordered- Monitor response to electrolyte replacements, including repeat lab results as appropriate- Instruct patient on fluid and nutrition as appropriate  INTERVENTIONS:- Monitor labs and assess patient for signs and symptoms of electrolyte imbalances- Administer electrolyte replacement as ordered- Monitor response to electrolyte replacements, including repeat lab results as appropriate- Instruct patient on fluid and nutrition as appropriate  Outcome: Progressing  Goal: Fluid balance maintained  Description: INTERVENTIONS:- Monitor  labs - Monitor I/O and WT- Instruct patient on fluid and nutrition as appropriate- Assess for signs & symptoms of volume excess or deficit  Outcome: Progressing     Problem: MUSCULOSKELETAL - ADULT  Goal: Maintain or return mobility to safest level of function  Description: INTERVENTIONS:- Assess patient's ability to carry out ADLs; assess patient's baseline for ADL function and identify physical deficits which impact ability to perform ADLs (bathing, care of mouth/teeth, toileting, grooming, dressing, etc.)- Assess/evaluate cause of self-care deficits - Assess range of motion- Assess patient's mobility- Assess patient's need for assistive devices and provide as appropriate- Encourage maximum independence but intervene and supervise when necessary- Involve family in performance of ADLs- Assess for home care needs following discharge - Consider OT consult to assist with ADL evaluation and planning for discharge- Provide patient education as appropriate  Outcome: Progressing

## 2025-03-25 NOTE — ASSESSMENT & PLAN NOTE
TTS at [facility] in [place]  Access: Right IJ permacath  Status post urgent HD yesterday for hyperkalemia  Regular session of hemodialysis today

## 2025-03-25 NOTE — ASSESSMENT & PLAN NOTE
Patient presented to the ER after he fell in the kitchen on his side.  He could not get up and crawled over broken pieces of glass to get to his phone  CT scan shows acute L2 vertebral fracture  X-ray showed chronic right pubic ramus fracture  Pain control with Lidoderm patch, scheduled Tylenol, Robaxin and low-dose oxycodone as needed  LSO brace.  PT/OT

## 2025-03-25 NOTE — ASSESSMENT & PLAN NOTE
This may be related to gabapentin use  Hopeful improvement with HD  Dose reduce gabapentin for HD  Agree with current gabapentin dose

## 2025-03-25 NOTE — PHYSICAL THERAPY NOTE
PHYSICAL THERAPY EVALUATION/TREATMENT     03/25/25 1125   PT Last Visit   PT Visit Date 03/25/25   Note Type   Note type Evaluation;Orthotic Management/Training   Type of Brace   Brace Applied [place] LSO   Additional Brace Ordered No   Patient Position When Brace Applied Supine   Bracing Recommendations Recommendation (comment)  (LSO to be worn when out of bed)   Education   Education Provided Yes  (Education provided to patient and nurse)   End of Consult   Patient Position at End of Consult Bedside chair   Nurse Communication Nurse aware of consult, application of brace   Pain Assessment   Pain Assessment Tool 0-10   Pain Score 7  (Left LS area (patient initially reporting 10/10 pain prior to medication))   Restrictions/Precautions   Braces or Orthoses LSO   Other Precautions Chair Alarm;Bed Alarm;Fall Risk;Pain   Home Living   Type of Home Apartment   Home Layout One level;Stairs to enter with rails  (3 stairs to enter)   Home Equipment Walker   Additional Comments Patient independent with ambulation prior to admission using walker at times as per patient. Patient using walker mostly for longer distance ambulation   Prior Function   Level of [place] Independent with ADLs;Independent with functional mobility;Independent with IADLS   Lives With Alone   IADLs Independent with driving;Independent with meal prep;Independent with medication management   Falls in the last 6 months 1 to 4   Comments Patient with history of falls and now a new fall in the kitchen causing this admission.  Patient with new findings of Acute fracture of the L2 vertebral body on x-ray   General   Additional Pertinent History Chart reviewed, patient admitted with closed fracture of second lumbar vertebrae.  Patient's status post fall now tolerating LSO well and presents with generalized weakness, gait dysfunction and increased pain   Family/Caregiver Present No   Cognition   Overall Cognitive Status WFL   Arousal/Participation  Cooperative   Orientation Level Oriented X4   Following Commands Follows all commands and directions without difficulty   Subjective   Subjective Patient states between 7-10 out of 10 pain in left LS area with all movement   RLE Assessment   RLE Assessment   (Range of motion within functional limits, strength 3+/5)   LLE Assessment   LLE Assessment   (Range of motion within functional limits, strength 3+/5)   Coordination   Movements are Fluid and Coordinated 0   Coordination and Movement Description Decreased coordination with bed mobility, transfers and gait with increased low back pain   Bed Mobility   Supine to Sit 2  Maximal assistance   Additional items Assist x 1;Verbal cues;LE management  (cueing for logrolling)   Additional Comments Patient out of bed in bedside chair at end of session with LSO donned   Transfers   Sit to Stand 3  Moderate assistance   Additional items Assist x 1   Stand to Sit 3  Moderate assistance   Additional items Assist x 1   Ambulation/Elevation   Gait Assistance 3  Moderate assist   Additional items Assist x 1;Verbal cues;Tactile cues   Assistive Device Rolling walker   Distance 5 to 7 feet with mod assist for weight shifting to advance lower extremities and maintain balance   Balance   Static Sitting Fair   Dynamic Sitting Poor +   Static Standing Fair -   Dynamic Standing Poor +   Ambulatory Poor +   Activity Tolerance   Activity Tolerance Patient limited by fatigue;Patient limited by pain;Treatment limited secondary to medical complications (Comment)   Nurse Made Aware Yes   Assessment   Prognosis Good   Problem List Decreased strength;Decreased range of motion;Decreased endurance;Impaired balance;Decreased mobility;Pain;Decreased coordination;Orthopedic restrictions   Assessment Patient seen for Physical Therapy evaluation. Patient admitted with Closed fracture of second lumbar vertebra (HCC).  Comorbidities affecting patient's physical performance include: .  Personal factors  affecting patient at time of initial evaluation include: ambulating with assistive device, inability to ambulate household distances, inability to navigate community distances, inability to navigate level surfaces without external assistance, inability to perform dynamic tasks in community, limited home support, positive fall history, inability to perform caregiver support/tasks, inability to perform physical activity, inability to perform ADLS, and inability to perform IADLS . Prior to admission, patient was independent with functional mobility with walker, independent with ADLS, independent with IADLS, living in a multi-level home, ambulating household distance, and ambulating community distances.  Please find objective findings from Physical Therapy assessment regarding body systems outlined above with impairments and limitations including weakness, decreased ROM, impaired balance, decreased endurance, impaired coordination, gait deviations, pain, decreased activity tolerance, decreased functional mobility tolerance, and fall risk.  The Barthel Index was used as a functional outcome tool presenting with a score of Barthel Index Score: 45 today indicating marked limitations of functional mobility and ADLS.  Patient's clinical presentation is currently unstable/unpredictable as seen in patient's presentation of vital sign response, changing level of pain, increased fall risk, new onset of impairment of functional mobility, decreased endurance, and new onset of weakness. Pt would benefit from continued Physical Therapy treatment to address deficits as defined above and maximize level of functional mobility. As demonstrated by objective findings, the assigned level of complexity for this evaluation is high.The patient's -[facility] Basic Mobility Inpatient Short Form Raw Score is 11. A Raw score of less than or equal to 16 suggests the patient may benefit from discharge to post-acute rehabilitation services. Please also  refer to the recommendation of the Physical Therapist for safe discharge planning.   Goals   Patient Goals to decrease pain   STG Expiration Date 04/01/25   Short Term Goal #1 Transfers and gait with roller walker and LSO donned with min assist   Short Term Goal #2 Gait endurance to functional household distances   LTG Expiration Date 04/08/25   Long Term Goal #1 Strength bilateral lower extremities 4 -/5   Long Term Goal #2 Independent transfers and gait with rolling walker for functional community distances as prior to admission   Plan   Treatment/Interventions Functional transfer training;LE strengthening/ROM;Elevations;Therapeutic exercise;Endurance training;Patient/family training;Equipment eval/education;Bed mobility;Gait training;Compensatory technique education   PT Frequency 4-6x/wk   Discharge Recommendation   Rehab Resource Intensity Level, PT II (Moderate Resource Intensity)   AM-PAC Basic Mobility Inpatient   Turning in Flat Bed Without Bedrails 2   Lying on Back to Sitting on Edge of Flat Bed Without Bedrails 2   Moving Bed to Chair 2   Standing Up From Chair Using Arms 2   Walk in Room 2   Climb 3-5 Stairs With Railing 1   Basic Mobility Inpatient Raw Score 11   Basic Mobility Standardized Score 30.25   [facility] Highest Level Of Mobility   -[facility] Goal 4: Move to chair/commode   -[facility] Achieved 6: Walk 10 steps or more   Barthel Index   Feeding 10   Bathing 0   Grooming Score 0   Dressing Score 5   Bladder Score 10   Bowels Score 10   Toilet Use Score 5   Transfers (Bed/Chair) Score 5   Mobility (Level Surface) Score 0   Stairs Score 0   Barthel Index Score 45   Additional Treatment Session   Start Time 1110   End Time 1125   Treatment Assessment s: Patient with 7/10 pain in left LS area at end of session O: Education completed in posture, body mechanics, and logrolling.  Bilateral lower extremity exercise completed as listed below A: Patient tolerated use of LSO brace well with decreasing pain.   Patient will benefit from continued physical therapy with progression as tolerated and increasing functional mobility with clinical staff as well   Exercises   Heelslides Supine;5 reps;Bilateral   Hip Flexion Sitting;5 reps;Bilateral   Knee AROM Long Arc Quad Sitting;5 reps;Bilateral   Ankle Pumps Sitting;5 reps;Bilateral   Marching Standing;5 reps;Bilateral   Balance training  Sidestepping completed for balance and coordination   Licensure   [place] License Number  Anatheather Lal PT 4 0 [id number]

## 2025-03-25 NOTE — PLAN OF CARE
Patient presents for a 3.5 hour HD session on a 2K2.5Ca bath for a serum potassium of 5.4 mmol/L drawn today with a net UF goal of 4 L as tolerated.    Post-Dialysis RN Treatment Note    Blood Pressure:  Pre 131/71 mm/Hg  Post 139/98 mmHg   EDW:  99.0 kg    Weight:  Pre 105.5 kg   Post 102.7 kg   Mode of weight measurement: Bed Scale   Volume Removed:  2811 ml    Treatment duration: 157 minutes    NS given:  No    Treatment shortened Yes, describe: 53 minutes due to pt's pain; unable to lay in bed   Medications given during Rx: 0.5 mg dilaudid   Estimated Kt/V:  Not Applicable   Access type: Permacath/TDC   Access Issues: No    Report called to primary nurse:   Yes, Audelia Mercado RN    Problem: METABOLIC, FLUID AND ELECTROLYTES - ADULT  Goal: Electrolytes maintained within normal limits  Description: INTERVENTIONS:- Monitor labs and assess patient for signs and symptoms of electrolyte imbalances- Administer electrolyte replacement as ordered- Monitor response to electrolyte replacements, including repeat lab results as appropriate- Instruct patient on fluid and nutrition as appropriate  INTERVENTIONS:- Monitor labs and assess patient for signs and symptoms of electrolyte imbalances- Administer electrolyte replacement as ordered- Monitor response to electrolyte replacements, including repeat lab results as appropriate- Instruct patient on fluid and nutrition as appropriate  Outcome: Progressing  Goal: Fluid balance maintained  Description: INTERVENTIONS:- Monitor labs - Monitor I/O and WT- Instruct patient on fluid and nutrition as appropriate- Assess for signs & symptoms of volume excess or deficit  Outcome: Progressing

## 2025-03-25 NOTE — ASSESSMENT & PLAN NOTE
Lab Results   Component Value Date    EGFR 6 03/25/2025    EGFR 4 03/24/2025    EGFR 5 02/27/2025    CREATININE 8.11 (H) 03/25/2025    CREATININE 10.44 (H) 03/24/2025    CREATININE 8.99 (H) 02/27/2025     TTS schedule as  outpatient  Noncompliant with dialysis sessions.  Missed dialysis on Tuesday and Thursday  Nephrology following  S/p urgent dialysis 3/24 for hyperkalemia

## 2025-03-25 NOTE — PROGRESS NOTES
Progress Note - Hospitalist   Name: Naresh Carpio 64 y.o. male I MRN: [id number]  Unit/Bed#: [address] [place] [address] Date of Admission: 3/24/2025   Date of Service: 3/25/2025 I Hospital Day: 0    Assessment & Plan  Closed fracture of second lumbar vertebra (HCC)  Patient presented to the ER after he fell in the kitchen on his side.  He could not get up and crawled over broken pieces of glass to get to his phone  CT scan shows acute L2 vertebral fracture  X-ray showed chronic right pubic ramus fracture  Pain control with Lidoderm patch, scheduled Tylenol, Robaxin and low-dose oxycodone as needed  LSO brace.  PT/OT  ESRD (end stage renal disease) on dialysis ([facility])  Lab Results   Component Value Date    EGFR 6 03/25/2025    EGFR 4 03/24/2025    EGFR 5 02/27/2025    CREATININE 8.11 (H) 03/25/2025    CREATININE 10.44 (H) 03/24/2025    CREATININE 8.99 (H) 02/27/2025     TTS schedule as  outpatient  Noncompliant with dialysis sessions.  Missed dialysis on Tuesday and Thursday  Nephrology following  S/p urgent dialysis 3/24 for hyperkalemia  Abnormal involuntary movement  Patient with twitching/tremors and some speech difficulty.  Per review of records had similar findings in March 2024 as well  Unclear if some of this is at baseline  CT head negative for acute pathology  Gabapentin dose reduced  Monitor for improvement with dialysis  Hyperkalemia  Potassium 6.5 on admission  Improving  Hemodialysis per nephrology  Type 2 diabetes mellitus, without long-term current use of insulin ([facility])  Lab Results   Component Value Date    HGBA1C 5.7 (H) 02/09/2025       Recent Labs     03/24/25  1421   POCGLU 182*     Not on medications at home.  A1c well-controlled  Hypertension  Continue Procardia XL  BP stable  Diabetic polyneuropathy associated with type 2 diabetes mellitus ([facility])  Decrease gabapentin dose to 100 mg 3 times a week after dialysis  Paroxysmal atrial fibrillation ([facility])  Continue nifedipine, restart Eliquis  Non-compliance  with treatment  Patient noncompliant with dialysis sessions and states he stopped taking all of his medications except for nifedipine for about 1 month  Discussed with patient to be compliant with medication/dialysis  Chronic diastolic (congestive) heart failure (HCC)  Wt Readings from Last 3 Encounters:   03/24/25 107 kg (234 lb 12.6 oz)   02/27/25 102 kg (224 lb 13.9 oz)   02/18/25 107 kg (235 lb)     Being managed with dialysis  Chest x-ray negative for acute pathology  Anemia in ESRD (end-stage renal disease)  (HCC)  Hemoglobin stable.  Repeat lab work in a.m.  Results from last 7 days   Lab Units 03/25/25  0531 03/24/25  1434   HEMOGLOBIN g/dL 9.5* 10.3*   HEMATOCRIT % 31.1* 33.7*   MCV fL 95 95        VTE Pharmacologic Prophylaxis:   Moderate Risk (Score 3-4) - Pharmacological DVT Prophylaxis Ordered: apixaban (Eliquis).    Mobility:   Basic Mobility Inpatient Raw Score: 12  JH-HLM Goal: 4: Move to chair/commode  JH-HLM Achieved: 2: Bed activities/Dependent transfer  JH-HLM Goal NOT achieved. Continue with multidisciplinary rounding and encourage appropriate mobility to improve upon JH-HLM goals.    Patient Centered Rounds: I performed bedside rounds with nursing staff today.   Discussions with Specialists or Other Care Team Provider: nephrology, rn, cm    Education and Discussions with Family / Patient: Patient declined call to .     Current Length of Stay: 0 day(s)  Current Patient Status: Observation   Certification Statement: The patient, admitted on an observation basis, will now require > 2 midnight hospital stay due to hyperkalemia, dialysis, monitor involuntary muscle movements s/p dialysis, back pain, PT/OT  Discharge Plan: Anticipate discharge in 24-48 hrs to discharge location to be determined pending rehab evaluations.    Code Status: Level 1 - Full Code    Subjective   Patient reports ongoing lower back pain, has some relief with pain medicine. Reports very mild shortness of breath.  No chest pain.    Objective :  Temp:  [97.2 °F (36.2 °C)-97.8 °F (36.6 °C)] 97.6 °F (36.4 °C)  HR:  [55-94] 73  BP: (119-160)/(50-82) 119/71  Resp:  [15-20] 16  SpO2:  [92 %-100 %] 92 %  O2 Device: None (Room air)    Body mass index is 30.98 kg/m².     Input and Output Summary (last 24 hours):     Intake/Output Summary (Last 24 hours) at 3/25/2025 0937  Last data filed at 3/25/2025 0501  Gross per 24 hour   Intake 500 ml   Output 3100 ml   Net -2600 ml       Physical Exam  Vitals and nursing note reviewed.   Constitutional:       General: He is not in acute distress.     Appearance: He is well-developed.   Cardiovascular:      Rate and Rhythm: Normal rate and regular rhythm.   Pulmonary:      Effort: Pulmonary effort is normal. No respiratory distress.      Breath sounds: Normal breath sounds.   Abdominal:      Palpations: Abdomen is soft.      Tenderness: There is no abdominal tenderness.   Musculoskeletal:         General: No swelling.      Right lower leg: Edema present.      Left lower leg: Edema present.      Comments: BLE dressings c/d/i   Skin:     General: Skin is warm and dry.      Capillary Refill: Capillary refill takes less than 2 seconds.   Neurological:      Mental Status: He is alert.      Comments: Involuntary twitching of right shoulder, hands   Psychiatric:         Mood and Affect: Mood normal.         Lines/Drains:  Lines/Drains/Airways       Active Status       Name Placement date Placement time Site Days    HD Permanent Double Catheter 08/30/23  --  Internal jugular  573                      Telemetry:  Telemetry Orders (From admission, onward)               24 Hour Telemetry Monitoring  Continuous x 24 Hours (Telem)        Expiring   Question:  Reason for 24 Hour Telemetry  Answer:  Metabolic/electrolyte disturbance with high probability of dysrhythmia. K level <3 or >6 OR KCL infusion >10mEq/hr                     Telemetry Reviewed: Normal Sinus Rhythm  Indication for Continued Telemetry Use:  No indication for continued use. Will discontinue.                Lab Results: I have reviewed the following results:   Results from last 7 days   Lab Units 03/25/25  0531 03/24/25  1434   WBC Thousand/uL 6.98 10.13   HEMOGLOBIN g/dL 9.5* 10.3*   HEMATOCRIT % 31.1* 33.7*   PLATELETS Thousands/uL 178 182   SEGS PCT %  --  79*   LYMPHO PCT %  --  6*   MONO PCT %  --  11   EOS PCT %  --  3     Results from last 7 days   Lab Units 03/25/25  0531 03/24/25  1434   SODIUM mmol/L 132* 133*   POTASSIUM mmol/L 5.4* 6.5*   CHLORIDE mmol/L 97 100   CO2 mmol/L 21 19*   BUN mg/dL 65* 95*   CREATININE mg/dL 8.11* 10.44*   ANION GAP mmol/L 14* 14*   CALCIUM mg/dL 7.8* 7.6*   ALBUMIN g/dL 3.5 3.9   TOTAL BILIRUBIN mg/dL  --  0.42   ALK PHOS U/L  --  94   ALT U/L  --  17   AST U/L  --  13   GLUCOSE RANDOM mg/dL 118 169*         Results from last 7 days   Lab Units 03/24/25  1421   POC GLUCOSE mg/dl 182*               Recent Cultures (last 7 days):         Imaging Results Review: I reviewed radiology reports from this admission including: chest xray.  Other Study Results Review: No additional pertinent studies reviewed.    Last 24 Hours Medication List:     Current Facility-Administered Medications:     acetaminophen (TYLENOL) tablet 975 mg, Q8H KEMAL    allopurinol (ZYLOPRIM) tablet 100 mg, Daily    apixaban (ELIQUIS) tablet 5 mg, BID    aspirin chewable tablet 81 mg, Daily    atorvastatin (LIPITOR) tablet 80 mg, Daily With Dinner    gabapentin (NEURONTIN) capsule 100 mg, Once per day on Tuesday Thursday Saturday    HYDROmorphone HCl (DILAUDID) injection 0.2 mg, Q6H PRN    influenza vaccine, high-dose (Fluzone High-Dose) IM injection 0.5 mL, Once    lidocaine (LIDODERM) 5 % patch 1 patch, Daily    methocarbamol (ROBAXIN) tablet 500 mg, Q6H PRN    NIFEdipine (PROCARDIA XL) 24 hr tablet 30 mg, After Dinner    oxyCODONE (ROXICODONE) split tablet 2.5 mg, Q8H PRN    sevelamer (RENAGEL) tablet 800 mg, TID With Meals    Administrative  Statements   Today, Patient Was Seen By: Nallely Pisano PA-C    **Please Note: This note may have been constructed using a voice recognition system.**

## 2025-03-25 NOTE — ASSESSMENT & PLAN NOTE
Patient with twitching/tremors and some speech difficulty.  Per review of records had similar findings in March 2024 as well  Unclear if some of this is at baseline.  Possibly related to uremia versus high-dose gabapentin  Monitor for now.  CT head negative for acute pathology

## 2025-03-25 NOTE — ASSESSMENT & PLAN NOTE
Wt Readings from Last 3 Encounters:   03/24/25 107 kg (234 lb 12.6 oz)   02/27/25 102 kg (224 lb 13.9 oz)   02/18/25 107 kg (235 lb)     Being managed with dialysis  Chest x-ray negative for acute pathology

## 2025-03-25 NOTE — PLAN OF CARE
Post-Dialysis RN Treatment Note    Blood Pressure:  Pre 131/68 mm/Hg  Post 157/77 mmHg   EDW:  99 kg    Weight:  Pre 109 kg   Post 106.5 kg   Mode of weight measurement: Bed Scale   Volume Removed:  2,500 ml    Treatment duration: 120 minutes    NS given:  No    Treatment shortened No   Medications given during Rx: None Reported   Estimated Kt/V:  Not Applicable   Access type: Permacath/TDC   Needle Gauge:  n/a   Access Issues: No    Report called to primary nurse:   Yes, via ESC to KR RN      Started patient on hemodialysis treatment with UF goal of 2-2.5L net as tolerated per HD order x 2 hours x 2K bath then 1K bath at the last hour of tx.    Problem: METABOLIC, FLUID AND ELECTROLYTES - ADULT  Goal: Electrolytes maintained within normal limits  Description: INTERVENTIONS:- Monitor labs and assess patient for signs and symptoms of electrolyte imbalances- Administer electrolyte replacement as ordered- Monitor response to electrolyte replacements, including repeat lab results as appropriate- Instruct patient on fluid and nutrition as appropriate  INTERVENTIONS:- Monitor labs and assess patient for signs and symptoms of electrolyte imbalances- Administer electrolyte replacement as ordered- Monitor response to electrolyte replacements, including repeat lab results as appropriate- Instruct patient on fluid and nutrition as appropriate  Outcome: Progressing  Goal: Fluid balance maintained  Description: INTERVENTIONS:- Monitor labs - Monitor I/O and WT- Instruct patient on fluid and nutrition as appropriate- Assess for signs & symptoms of volume excess or deficit  Outcome: Progressing

## 2025-03-25 NOTE — CASE MANAGEMENT
Case Management Assessment & Discharge Planning Note    Patient name Naresh Carpio  Location 4 [place] 416/4 [place] 416-* MRN [id number]  : 1960 Date 3/25/2025       Current Admission Date: 3/24/2025  Current Admission Diagnosis:Closed fracture of second lumbar vertebra (HCC)   Patient Active Problem List    Diagnosis Date Noted Date Diagnosed    Abnormal involuntary movement 2025     Disorder of acid-base balance 2025     Closed fracture of second lumbar vertebra ([facility]) 2025     Non-compliance with treatment 2025     Generalized weakness 2025     Gout 2025     Closed fracture of proximal end of left humerus with routine healing 02/10/2025     Left shoulder pain 2025     ESRD (end stage renal disease) on dialysis ([facility]) 2025     Primary hypertension 2025     Anemia in ESRD (end-stage renal disease)  ([facility]) 2025     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, on chronic dialysis ([facility]) 2025     Renal lesion 2024     Chronic wound 2024     Chronic diastolic (congestive) heart failure ([facility]) 2024     Pulmonary edema 2024     Pleural effusion 2024     Elevated troponin 2024     Fall 2024     Melena 2024     Paroxysmal atrial fibrillation ([facility])      ESRD (end stage renal disease) ([facility]) 10/25/2024     Nausea 10/20/2024     Hyponatremia 10/19/2024     Chronic kidney disease-mineral and bone disorder 10/14/2024     Falls 10/08/2024     PAD (peripheral artery disease) ([facility]) 10/08/2024     Closed fracture of right pelvis ([facility]) 2024     Bilateral sacral fracture, closed ([facility]) 2024     Hyperkalemia 2024     Difficulty with speech 2024     History of amputation of hallux ([facility]) 2024     Hypertensive urgency 2024     Facial cellulitis 2024     Constipation 2023     Cellulitis of right foot      Polymicrobial bacterial  infection 08/24/2023     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 mellitus, with necrosis of bone (HCC)      Acquired deformity of foot, right      Charcot's joint      Subacute osteomyelitis of right foot (HCC)      Open wound of right foot 08/21/2023     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 mellitus, with bone involvement without evidence of necrosis (HCC)      Diabetic ulcer of left midfoot associated with type 2 diabetes mellitus, limited to breakdown of skin (HCC)      Diabetic polyneuropathy associated with type 2 diabetes mellitus (HCC)      Diabetes mellitus type 2 with peripheral artery disease (HCC)      History of amputation of lesser toe of left foot (HCC)      Onychomycosis      Status post fall 08/19/2023     Traumatic rhabdomyolysis ([facility]) 08/19/2023     Leukocytosis 08/19/2023     Osteoarthritis of left hip 07/27/2022     Severe Left Orchalgia 07/26/2022     Right shoulder pain 07/26/2022     Left hip pain 07/06/2022     Hemodialysis status ([facility])      Hypervolemia      Diarrhea 01/22/2022     Anemia due to chronic kidney disease, on chronic dialysis (HCC) 01/21/2022     Type 2 diabetes mellitus, without long-term current use of insulin (HCC)      Hypertension      History of TIA (transient ischemic attack)      T10 vertebral fracture ([facility])        LOS (days): 0  Geometric Mean LOS (GMLOS) (days):   Days to GMLOS:     OBJECTIVE:  PATIENT READMITTED TO HOSPITAL      Current admission status: Inpatient     Preferred Pharmacy:   [id number] [address] [place] [address] 206  [address] [address]  Phone: [phone] Fax: [phone]    Primary Care Provider: Jeffrey Jackson    Primary Insurance: MEDICARE  Secondary Insurance:     ASSESSMENT:  Active Health Care Proxies       CarpioAlicja Health Care Representative - Sister   Primary Phone: [phone] (Mobile)            Readmission Root Cause  30 Day Readmission:  Yes  During your hospital stay, did someone (provider, nurse, ) explain your care to you in a way you could understand?: Yes  Did you feel medically stable to leave the hospital?: Yes  Were you able to pay for your medication at the pharmacy?: Yes  Did you have reliable transportation to take you to your appointments?: Yes  During previous admission, was a post-acute recommendation made?: Yes  What post-acute resources were offered?: STR, Offered, but declined  Patient was readmitted due to: Closed fracture of second lumbar vertebra  Action Plan: Medical management, therapy evaluations    Patient Information  Admitted from:: Home  Mental Status: Alert  During Assessment patient was accompanied by: Not accompanied during assessment  Assessment information provided by:: Patient  Primary Caregiver: Self  Support Systems: Family members  [facility] Residence: [facility]  What city do you live in?: [place], [place]  Home entry access options. Select all that apply.: Stairs  Number of steps to enter home.: One Flight  Type of Current Residence: Apartment  Floor Level: 1  Living Arrangements: Lives Alone    Activities of Daily Living Prior to Admission  Functional Status: Independent  Completes ADLs independently?: Yes  Ambulates independently?: Yes  Does patient currently own DME?: Yes  What DME does the patient currently own?: Walker, [employer]  Does the patient have a history of Short-Term Rehab?: Yes  Does patient have a history of HHC?: No  Does patient currently have HHC?: No     Patient Information Continued  Does patient have prescription coverage?: No  Can the patient afford their medications and any related supplies (such as glucometers or test strips)?: Yes  Does patient receive dialysis treatments?: Yes ([facility]; [facility]; Drives self)     Means of Transportation  Means of Transport to [facility]:: Drives Self    DISCHARGE DETAILS:    Discharge planning discussed with:: Patient  Freedom of  "Choice: Yes  Comments - Freedom of Choice: SW met bedside with patient to introduce role, complete assessment, and discuss discharge plan. SW reviewed evaluation by PT with recommendation for STR. Patient verbalized \"I just dont know what to do.\" SW offered to research STR options while he continues to consider plan. Patient consenting to SW sending STR referrals.     Were Treatment Team discharge recommendations reviewed with patient/caregiver?: Yes  Did patient/caregiver verbalize understanding of patient care needs?: Yes  Were patient/caregiver advised of the risks associated with not following Treatment Team discharge recommendations?: Yes    Contacts  Patient Contacts: Alicja Carpio (sister)  Relationship to Patient:: Family  Contact Method: Phone  Phone Number: [phone]  Reason/Outcome: Emergency Contact    Other Referral/Resources/Interventions Provided:  Interventions: Short Term Rehab  Referral Comments: [place] STR referrals sent in [place]. Responses pending.     Treatment Team Recommendation: Short Term Rehab                                                                 "

## 2025-03-25 NOTE — ASSESSMENT & PLAN NOTE
Potassium 6.5.  Received medications in the ER for IT  Hemodialysis today per nephrology.  Low potassium diet  Repeat lab work in a.m.

## 2025-03-25 NOTE — ASSESSMENT & PLAN NOTE
Patient with twitching/tremors and some speech difficulty.  Per review of records had similar findings in March 2024 as well  Unclear if some of this is at baseline  CT head negative for acute pathology  Gabapentin dose reduced  Monitor for improvement with dialysis

## 2025-03-26 ENCOUNTER — APPOINTMENT (INPATIENT)
Dept: RADIOLOGY | Facility: HOSPITAL | Age: 65
DRG: 463 | End: 2025-03-26
Payer: MEDICARE

## 2025-03-26 PROBLEM — R25.9 ABNORMAL INVOLUNTARY MOVEMENT: Status: RESOLVED | Noted: 2025-03-24 | Resolved: 2025-03-26

## 2025-03-26 PROBLEM — S91.309A MULTIPLE OPEN WOUNDS OF FOOT: Status: ACTIVE | Noted: 2025-03-26

## 2025-03-26 LAB
ANION GAP SERPL CALCULATED.3IONS-SCNC: 16 MMOL/L (ref 4–13)
BUN SERPL-MCNC: 46 MG/DL (ref 5–25)
CALCIUM SERPL-MCNC: 8.2 MG/DL (ref 8.4–10.2)
CHLORIDE SERPL-SCNC: 93 MMOL/L (ref 96–108)
CO2 SERPL-SCNC: 22 MMOL/L (ref 21–32)
CREAT SERPL-MCNC: 6.58 MG/DL (ref 0.6–1.3)
ERYTHROCYTE [DISTWIDTH] IN BLOOD BY AUTOMATED COUNT: 17.6 % (ref 11.6–15.1)
GFR SERPL CREATININE-BSD FRML MDRD: 8 ML/MIN/1.73SQ M
GLUCOSE SERPL-MCNC: 105 MG/DL (ref 65–140)
HCT VFR BLD AUTO: 34 % (ref 36.5–49.3)
HGB BLD-MCNC: 10.3 G/DL (ref 12–17)
MCH RBC QN AUTO: 29.1 PG (ref 26.8–34.3)
MCHC RBC AUTO-ENTMCNC: 30.3 G/DL (ref 31.4–37.4)
MCV RBC AUTO: 96 FL (ref 82–98)
PLATELET # BLD AUTO: 197 THOUSANDS/UL (ref 149–390)
PMV BLD AUTO: 10 FL (ref 8.9–12.7)
POTASSIUM SERPL-SCNC: 4.8 MMOL/L (ref 3.5–5.3)
RBC # BLD AUTO: 3.54 MILLION/UL (ref 3.88–5.62)
SODIUM SERPL-SCNC: 131 MMOL/L (ref 135–147)
WBC # BLD AUTO: 7.9 THOUSAND/UL (ref 4.31–10.16)

## 2025-03-26 PROCEDURE — 99232 SBSQ HOSP IP/OBS MODERATE 35: CPT | Performed by: INTERNAL MEDICINE

## 2025-03-26 PROCEDURE — 97167 OT EVAL HIGH COMPLEX 60 MIN: CPT

## 2025-03-26 PROCEDURE — 80048 BASIC METABOLIC PNL TOTAL CA: CPT

## 2025-03-26 PROCEDURE — 85027 COMPLETE CBC AUTOMATED: CPT

## 2025-03-26 PROCEDURE — 73620 X-RAY EXAM OF FOOT: CPT

## 2025-03-26 PROCEDURE — 97530 THERAPEUTIC ACTIVITIES: CPT

## 2025-03-26 RX ADMIN — ACETAMINOPHEN 975 MG: 325 TABLET ORAL at 21:07

## 2025-03-26 RX ADMIN — HYDROMORPHONE HYDROCHLORIDE 0.5 MG: 1 INJECTION, SOLUTION INTRAMUSCULAR; INTRAVENOUS; SUBCUTANEOUS at 01:29

## 2025-03-26 RX ADMIN — ACETAMINOPHEN 975 MG: 325 TABLET ORAL at 13:11

## 2025-03-26 RX ADMIN — Medication 2.5 MG: at 13:09

## 2025-03-26 RX ADMIN — HYDROMORPHONE HYDROCHLORIDE 0.5 MG: 1 INJECTION, SOLUTION INTRAMUSCULAR; INTRAVENOUS; SUBCUTANEOUS at 14:36

## 2025-03-26 RX ADMIN — ALLOPURINOL 100 MG: 100 TABLET ORAL at 09:05

## 2025-03-26 RX ADMIN — Medication 2.5 MG: at 21:08

## 2025-03-26 RX ADMIN — Medication 2.5 MG: at 04:53

## 2025-03-26 RX ADMIN — ATORVASTATIN CALCIUM 80 MG: 80 TABLET, FILM COATED ORAL at 15:51

## 2025-03-26 RX ADMIN — APIXABAN 5 MG: 5 TABLET, FILM COATED ORAL at 18:33

## 2025-03-26 RX ADMIN — SEVELAMER HYDROCHLORIDE 800 MG: 800 TABLET, FILM COATED ORAL at 15:50

## 2025-03-26 RX ADMIN — APIXABAN 5 MG: 5 TABLET, FILM COATED ORAL at 09:05

## 2025-03-26 RX ADMIN — NIFEDIPINE 30 MG: 30 TABLET, EXTENDED RELEASE ORAL at 18:33

## 2025-03-26 RX ADMIN — SEVELAMER HYDROCHLORIDE 800 MG: 800 TABLET, FILM COATED ORAL at 09:05

## 2025-03-26 RX ADMIN — SEVELAMER HYDROCHLORIDE 800 MG: 800 TABLET, FILM COATED ORAL at 12:59

## 2025-03-26 RX ADMIN — HYDROMORPHONE HYDROCHLORIDE 0.5 MG: 1 INJECTION, SOLUTION INTRAMUSCULAR; INTRAVENOUS; SUBCUTANEOUS at 22:38

## 2025-03-26 RX ADMIN — ASPIRIN 81 MG CHEWABLE TABLET 81 MG: 81 TABLET CHEWABLE at 09:05

## 2025-03-26 RX ADMIN — ACETAMINOPHEN 975 MG: 325 TABLET ORAL at 05:15

## 2025-03-26 RX ADMIN — HYDROMORPHONE HYDROCHLORIDE 0.5 MG: 1 INJECTION, SOLUTION INTRAMUSCULAR; INTRAVENOUS; SUBCUTANEOUS at 09:07

## 2025-03-26 NOTE — ASSESSMENT & PLAN NOTE
Results from last 7 days   Lab Units 03/26/25  0519 03/25/25  0531 03/24/25  1434   HEMOGLOBIN g/dL 10.3* 9.5* 10.3*   HEMATOCRIT % 34.0* 31.1* 33.7*   MCV fL 96 95 95   Hemoglobin is stable  Daily CBC

## 2025-03-26 NOTE — ASSESSMENT & PLAN NOTE
Wt Readings from Last 3 Encounters:   03/24/25 107 kg (234 lb 12.6 oz)   02/27/25 102 kg (224 lb 13.9 oz)   02/18/25 107 kg (235 lb)     Volume management with dialysis   Daily weights, I&O  1800mL FR

## 2025-03-26 NOTE — PHYSICAL THERAPY NOTE
PT TREATMENT       03/26/25 1342   PT Last Visit   PT Visit Date 03/26/25   Note Type   Note Type Treatment   Pain Assessment   Pain Assessment Tool 0-10   Pain Score 9   Pain Location/Orientation Orientation: Lower;Location: Back   Pain Onset/Description Onset: Ongoing;Descriptor: Sore;Descriptor: Sharp   Effect of Pain on Daily Activities limits rest/mobility   Patient's Stated Pain Goal No pain   Hospital Pain Intervention(s) Repositioned;Ambulation/increased activity   Restrictions/Precautions   Weight Bearing Precautions Per Order No   Braces or Orthoses LSO   Other Precautions Bed Alarm;Chair Alarm;Fall Risk;Pain  (back brace to be worn when OOB)   General   Chart Reviewed Yes   Family/Caregiver Present No   Cognition   Overall Cognitive Status WFL   Arousal/Participation Cooperative   Orientation Level Oriented X4   Subjective   Subjective Pt agreeable to PT session this afternoon.   Bed Mobility   Supine to Sit Unable to assess   Sit to Supine Unable to assess   Additional Comments Received sitting in bedside chair   Transfers   Sit to Stand 5  Supervision   Additional items Assist x 1;Verbal cues;Increased time required;Armrests   Stand to Sit 5  Supervision   Additional items Assist x 1;Verbal cues;Increased time required;Armrests   Ambulation/Elevation   Gait pattern Foward flexed;Short stride;Step through pattern   Gait Assistance 5  Supervision   Additional items Assist x 1;Verbal cues   Assistive Device Rolling walker   Distance 75 feet x 2   Stair Management Assistance Not tested   Balance   Static Sitting Fair +   Dynamic Sitting Fair   Static Standing Fair   Dynamic Standing Fair -   Ambulatory Fair -  (RW)   Activity Tolerance   Activity Tolerance Patient tolerated treatment well;Patient limited by pain   Nurse Made Aware yes   Assessment   Prognosis Good   Problem List Decreased strength;Decreased range of motion;Decreased endurance;Impaired balance;Decreased mobility;Pain;Decreased  coordination;Orthopedic restrictions   Assessment Pt seen for PT session this afternoon. Pt making progress towards IP PT goals. Able to progress to ambulate x 150 feet with Min A to close supervision using RW. Pt requires Min A to don LSO + intermittent verbal cues for proper positioning of brace. Pt reports most pain/discomfort with bed mobility ; refused to perform today despite education. Continue to recommend level 2 moderate resource intensity.  The patient's AM-PAC Basic Mobility Inpatient Short Form Raw Score is 15. A Raw score of less than or equal to 16 suggests the patient may benefit from discharge to post-acute rehabilitation services. Please also refer to the recommendation of the Physical Therapist for safe discharge planning.     Goals   Patient Goals to decrease pain, get better   Plan   Treatment/Interventions Functional transfer training;LE strengthening/ROM;Endurance training;Therapeutic exercise;Gait training;Bed mobility;Spoke to nursing;ADL retraining   Progress Progressing toward goals   PT Frequency 3-5x/wk  (decrease in frequency as pt presents with improved functional mobility)   Discharge Recommendation   Rehab Resource Intensity Level, PT II (Moderate Resource Intensity)   AM-PAC Basic Mobility Inpatient   Turning in Flat Bed Without Bedrails 3   Lying on Back to Sitting on Edge of Flat Bed Without Bedrails 2   Moving Bed to Chair 3   Standing Up From Chair Using Arms 3   Walk in Room 3   Climb 3-5 Stairs With Railing 1   Basic Mobility Inpatient Raw Score 15   Basic Mobility Standardized Score 36.97   [facility] Highest Level Of Mobility   -HLM Goal 4: Move to chair/commode   -HLM Achieved 7: Walk 25 feet or more   Education   Education Provided Mobility training;Assistive device   Patient Demonstrates verbal understanding;Reinforcement needed   End of Consult   Patient Position at End of Consult Bedside chair;Bed/Chair alarm activated;All needs within reach   Licensure   [place]  License Number  Lizeth Gill [id number]

## 2025-03-26 NOTE — ASSESSMENT & PLAN NOTE
Patient presented to the ER after he fell in the kitchen on his side.  He could not get up and crawled over broken pieces of glass to get to his phone  CT scan shows acute L2 vertebral fracture  X-ray showed chronic right pubic ramus fracture  Pain control with Lidoderm patch, scheduled Tylenol, Robaxin and low-dose oxycodone as needed  LSO brace  PT/OT- recommending STR

## 2025-03-26 NOTE — ASSESSMENT & PLAN NOTE
Lab Results   Component Value Date    EGFR 8 03/26/2025    EGFR 6 03/25/2025    EGFR 4 03/24/2025    CREATININE 6.58 (H) 03/26/2025    CREATININE 8.11 (H) 03/25/2025    CREATININE 10.44 (H) 03/24/2025     TTS schedule as  outpatient  Noncompliant with dialysis sessions.  Missed dialysis on Tuesday and Thursday  S/p urgent HD on 3/25/25 for hyperkalemia  Next dialysis for Thursday  Nephrology is following

## 2025-03-26 NOTE — PLAN OF CARE
Problem: PAIN - ADULT  Goal: Verbalizes/displays adequate comfort level or baseline comfort level  Description: Interventions:- Encourage patient to monitor pain and request assistance- Assess pain using appropriate pain scale- Administer analgesics based on type and severity of pain and evaluate response- Implement non-pharmacological measures as appropriate and evaluate response- Consider cultural and social influences on pain and pain management- Notify physician/advanced practitioner if interventions unsuccessful or patient reports new pain  Outcome: Progressing     Problem: SAFETY ADULT  Goal: Patient will remain free of falls  Description: INTERVENTIONS:- Educate patient/family on patient safety including physical limitations- Instruct patient to call for assistance with activity - Consult OT/PT to assist with strengthening/mobility - Keep Call bell within reach- Keep bed low and locked with side rails adjusted as appropriate- Keep care items and personal belongings within reach- Initiate and maintain comfort rounds- Make Fall Risk Sign visible to staff- Offer Toileting every 2 Hours, in advance of need- Initiate/Maintain bed alarm- Obtain necessary fall risk management equipment: bed alarm- Apply yellow socks and bracelet for high fall risk patients- Consider moving patient to room near nurses station  Outcome: Progressing  Goal: Maintains/Returns to pre admission functional level  Description: INTERVENTIONS:- Perform AM-PAC 6 Click Basic Mobility/ Daily Activity assessment daily.- Set and communicate daily mobility goal to care team and patient/family/caregiver. - Collaborate with rehabilitation services on mobility goals if consulted- Perform Range of Motion 4 times a day.- Reposition patient every 2 hours.- Dangle patient 4 times a day- Stand patient 4 times a day- Ambulate patient 4 times a day- Out of bed to chair 4 times a day - Out of bed for meals 3 times a day- Out of bed for toileting- Record  patient progress and toleration of activity level   Outcome: Progressing     Problem: DISCHARGE PLANNING  Goal: Discharge to home or other facility with appropriate resources  Description: INTERVENTIONS:- Identify barriers to discharge w/patient and caregiver- Arrange for needed discharge resources and transportation as appropriate- Identify discharge learning needs (meds, wound care, etc.)- Arrange for interpretive services to assist at discharge as needed- Refer to Case Management Department for coordinating discharge planning if the patient needs post-hospital services based on physician/advanced practitioner order or complex needs related to functional status, cognitive ability, or social support system  Outcome: Progressing     Problem: Knowledge Deficit  Goal: Patient/family/caregiver demonstrates understanding of disease process, treatment plan, medications, and discharge instructions  Description: Complete learning assessment and assess knowledge base.Interventions:- Provide teaching at level of understanding- Provide teaching via preferred learning methods  Outcome: Progressing     Problem: NEUROSENSORY - ADULT  Goal: Achieves stable or improved neurological status  Description: INTERVENTIONS- Monitor and report changes in neurological status- Monitor vital signs such as temperature, blood pressure, glucose, and any other labs ordered - Initiate measures to prevent increased intracranial pressure- Monitor for seizure activity and implement precautions if appropriate    Outcome: Progressing     Problem: METABOLIC, FLUID AND ELECTROLYTES - ADULT  Goal: Electrolytes maintained within normal limits  Description: INTERVENTIONS:- Monitor labs and assess patient for signs and symptoms of electrolyte imbalances- Administer electrolyte replacement as ordered- Monitor response to electrolyte replacements, including repeat lab results as appropriate- Instruct patient on fluid and nutrition as  appropriate  INTERVENTIONS:- Monitor labs and assess patient for signs and symptoms of electrolyte imbalances- Administer electrolyte replacement as ordered- Monitor response to electrolyte replacements, including repeat lab results as appropriate- Instruct patient on fluid and nutrition as appropriate  Outcome: Progressing  Goal: Fluid balance maintained  Description: INTERVENTIONS:- Monitor labs - Monitor I/O and WT- Instruct patient on fluid and nutrition as appropriate- Assess for signs & symptoms of volume excess or deficit  Outcome: Progressing     Problem: MUSCULOSKELETAL - ADULT  Goal: Maintain or return mobility to safest level of function  Description: INTERVENTIONS:- Assess patient's ability to carry out ADLs; assess patient's baseline for ADL function and identify physical deficits which impact ability to perform ADLs (bathing, care of mouth/teeth, toileting, grooming, dressing, etc.)- Assess/evaluate cause of self-care deficits - Assess range of motion- Assess patient's mobility- Assess patient's need for assistive devices and provide as appropriate- Encourage maximum independence but intervene and supervise when necessary- Involve family in performance of ADLs- Assess for home care needs following discharge - Consider OT consult to assist with ADL evaluation and planning for discharge- Provide patient education as appropriate  Outcome: Progressing     Problem: Prexisting or High Potential for Compromised Skin Integrity  Goal: Skin integrity is maintained or improved  Description: INTERVENTIONS:- Identify patients at risk for skin breakdown- Assess and monitor skin integrity- Assess and monitor nutrition and hydration status- Monitor labs - Assess for incontinence - Turn and reposition patient- Assist with mobility/ambulation- Relieve pressure over bony prominences- Avoid friction and shearing- Provide appropriate hygiene as needed including keeping skin clean and dry- Evaluate need for skin  moisturizer/barrier cream- Collaborate with interdisciplinary team - Patient/family teaching- Consider wound care consult   Outcome: Progressing     Problem: Nutrition/Hydration-ADULT  Goal: Nutrient/Hydration intake appropriate for improving, restoring or maintaining nutritional needs  Description: Monitor and assess patient's nutrition/hydration status for malnutrition. Collaborate with interdisciplinary team and initiate plan and interventions as ordered.  Monitor patient's weight and dietary intake as ordered or per policy. Utilize nutrition screening tool and intervene as necessary. Determine patient's food preferences and provide high-protein, high-caloric foods as appropriate. INTERVENTIONS:- Monitor oral intake, urinary output, labs, and treatment plans- Assess nutrition and hydration status and recommend course of action- Evaluate amount of meals eaten- Assist patient with eating if necessary - Allow adequate time for meals- Recommend/ encourage appropriate diets, oral nutritional supplements, and vitamin/mineral supplements- Order, calculate, and assess calorie counts as needed- Recommend, monitor, and adjust tube feedings and TPN/PPN based on assessed needs- Assess need for intravenous fluids- Provide specific nutrition/hydration education as appropriate- Include patient/family/caregiver in decisions related to nutrition  Outcome: Progressing

## 2025-03-26 NOTE — DISCHARGE INSTR - OTHER ORDERS
Wound Care Plan:    1-Cleanse wounds to left lateral foot, right medial foot, left medial foot and left anterior lower leg with wound cleanser & pat dry. Apply thin layer of Normlgel to wounds, Adaptic cut to size of wounds, ABD, rolled gauze and tape. Change 3x/week and as needed for soilage/dislodgement.  2-Cleanse wounds to left 2nd toe and left 1st toe with wound cleanser & pat dry. Paint wounds with betadine, cover with ABD, rolled gauze and tape. Change every other day & prn soilage/dislodgement.  3-Apply Prevent barrier cream or equivalent to bilateral sacrum, buttock 2x/day and as needed and to bilateral heels with dressing changes.  4-Float heels on 2 pillows in bed to offload pressure so heels are not in contact with mattress or pillows.  5-Use pressure redistribution cushion in chair when out of bed. Avoid prolonged sitting.  6-Moisturize skin daily with skin nourishing cream.  7-Turn/reposition every 2 hours in bed and every hour when out of bed to chair for pressure re-distribution on skin.   8-Follow up with Podiatry as directed.

## 2025-03-26 NOTE — ASSESSMENT & PLAN NOTE
This may be related to gabapentin use  Currently resolved with back-to-back HD  Continue current dose of gabapentin (100 mg 3 times per week after dialysis)

## 2025-03-26 NOTE — OCCUPATIONAL THERAPY NOTE
OT EVALUATION       03/26/25 1042   OT Last Visit   OT Visit Date 03/26/25   Note Type   Note type Evaluation   Pain Assessment   Pain Assessment Tool 0-10   Pain Score 6   Pain Location/Orientation Location: Back;Orientation: Bilateral;Location: Shoulder   Restrictions/Precautions   Braces or Orthoses LSO   Other Precautions Chair Alarm;Bed Alarm;Fall Risk;Pain   Home Living   Type of Home Apartment   Home Layout One level  (7 NAZARIO per pt, however during session referring to 3 steps)   Bathroom Shower/Tub   (sponge bathes due to dialysis port)   Bathroom Toilet Standard   Home Equipment Walker   Additional Comments uses walker for when outside   Prior Function   Level of [place] Independent with ADLs;Independent with functional mobility;Independent with IADLS   Lives With Alone   Receives Help From Family   IADLs Independent with driving;Independent with meal prep;Independent with medication management   Falls in the last 6 months 1 to 4   Comments Patient with history of falls and now a new fall in the kitchen causing this admission.  Patient with new findings of Acute fracture of the L2 vertebral body on x-ray   Lifestyle   Reciprocal Relationships supportive sister   ADL   Eating Assistance 7  Independent   Grooming Assistance 4  Minimal Assistance   Grooming Deficit Wash/dry hands;Standing with assistive device   UB Bathing Assistance 5  Supervision/Setup   LB Bathing Assistance 3  Moderate Assistance   UB Dressing Assistance 4  Minimal Assistance   UB Dressing Deficit   (mod assist to shawna LSO)   LB Dressing Assistance 3  Moderate Assistance   Toileting Assistance  4  Minimal Assistance   Transfers   Sit to Stand 4  Minimal assistance   Stand to Sit 4  Minimal assistance   Toilet transfer   (declined to complete a toilet transfer)   Balance   Static Sitting Fair   Dynamic Sitting Fair -   Static Standing Fair -   Dynamic Standing Fair -   Activity Tolerance   Activity Tolerance Patient limited by  fatigue;Patient limited by pain   RUE Assessment   RUE Assessment WFL   LUE Assessment   LUE Assessment   (shoulder flexion to 90, reports shoulder fracture recently but its healing)   Cognition   Overall Cognitive Status WFL   Arousal/Participation Cooperative   Attention Within functional limits   Orientation Level Oriented X4   Following Commands Follows all commands and directions without difficulty   Assessment   Limitation Decreased ADL status;Decreased UE strength;Decreased Safe judgement during ADL;Decreased endurance;Decreased high-level ADLs;Decreased self-care trans  (decreased balance and mobility)   Prognosis Good   Assessment Patient evaluated by Occupational Therapy.  Patient admitted with Closed fracture of second lumbar vertebra (HCC).  The patients occupational profile, medical and therapy history includes a extensive additional review of physical, cognitive, or psychosocial history related to current functional performance.  Comorbidities affecting functional mobility and ADLS include: Afib, diabetes, ESRD on hemodialysis, and hypertension and L toe amputation.  Prior to admission, patient was independent with functional mobility with walker when outside, independent with ADLS, and independent with IADLS.  The evaluation identifies the following performance deficits: weakness, decreased ROM, impaired balance, decreased endurance, increased fall risk, new onset of impairment of functional mobility, decreased ADLS, decreased IADLS, pain, decreased activity tolerance, decreased safety awareness, impaired judgement, and decreased strength, that result in activity limitations and/or participation restrictions. This evaluation requires clinical decision making of high complexity, because the patient presents with comorbidites that affect occupational performance and required significant modification of tasks or assistance with consideration of multiple treatment options.  The Barthel Index was used as a  functional outcome tool presenting with a score of Barthel Index Score: 50, indicating marked limitations of functional mobility and ADLS.  The patient's raw score on the -PAC Daily Activity Inpatient Short Form is 18. A raw score of less than 19 suggests the patient may benefit from discharge to post-acute rehabilitation services. Please refer to the recommendation of the Occupational Therapist for safe discharge planning.  Patient will benefit from skilled Occupational Therapy services to address above deficits and facilitate a safe return to prior level of function.   Goals   Patient Goals to decrease pain   STG Time Frame   (1-7 days)   Short Term Goal  Goals established to promote Patient Goals: to decrease pain:  Grooming: supervision standing at sink; Bathing: min assist; Upper Body Dressing supervision; Lower Body Dressing: min assist; Toileting: supervision; Patient will increase ambulatory standard toilet transfer to supervision with rolling walker to increase performance and safety with ADLS and functional mobility; Patient will increase standing tolerance to 5 minutes during ADL task to decrease assistance level and decrease fall risk; Patient will increase bed mobility to supervision in preparation for ADLS and transfers; Patient will increase functional mobility to and from bathroom with rolling walker with supervision to increase performance with ADLS and to use a toilet; Patient will tolerate 5 minutes of UE ROM/strengthening to increase general activity tolerance and performance in ADLS/IADLS; Patient will improve functional activity tolerance to 10 minutes of sustained functional tasks to increase participation in basic self-care and decrease assistance level;  Patient will increase dynamic standing balance to fair to improve postural stability and decrease fall risk during standing ADLS and transfers.   LTG Time Frame   (8-14 days)   Long Term Goal Grooming: independent standing at sink;  Bathing: supervision; Upper Body Dressing independent; Lower Body Dressing: supervision; Toileting: independent; Patient will increase ambulatory standard toilet transfer to independent with rolling walker to increase performance and safety with ADLS and functional mobility; Patient will increase standing tolerance to 10 minutes during ADL task to decrease assistance level and decrease fall risk; Patient will increase bed mobility to independent in preparation for ADLS and transfers; Patient will increase functional mobility to and from bathroom with rolling walker independently to increase performance with ADLS and to use a toilet; Patient will tolerate 10 minutes of UE ROM/strengthening to increase general activity tolerance and performance in ADLS/IADLS; Patient will improve functional activity tolerance to 20 minutes of sustained functional tasks to increase participation in basic self-care and decrease assistance level;  Patient will increase dynamic standing balance to fair+ to improve postural stability and decrease fall risk during standing ADLS and transfers.  Pt will score >/= 22/24 on AM-PAC Daily Activity Inpatient scale to promote safe independence with ADLs and functional mobility; Pt will score >/= 80/100 on Barthel Index in order to decrease caregiver assistance needed and increase ability to perform ADLs and functional mobility.   Plan   Treatment Interventions ADL retraining;Functional transfer training;UE strengthening/ROM;Endurance training;Patient/family training;Equipment evaluation/education;Activityengagement;Compensatory technique education   Goal Expiration Date 04/09/25   OT Frequency 3-5x/wk   Discharge Recommendation   Rehab Resource Intensity Level, OT II (Moderate Resource Intensity)   AM-PAC Daily Activity Inpatient   Lower Body Dressing 2   Bathing 2   Toileting 3   Upper Body Dressing 3   Grooming 4   Eating 4   Daily Activity Raw Score 18   Daily Activity Standardized Score (Calc  for Raw Score >=11) 38.66   AM-PAC Applied Cognition Inpatient   Following a Speech/Presentation 4   Understanding Ordinary Conversation 4   Taking Medications 4   Remembering Where Things Are Placed or Put Away 4   Remembering List of 4-5 Errands 4   Taking Care of Complicated Tasks 4   Applied Cognition Raw Score 24   Applied Cognition Standardized Score 62.21   Barthel Index   Feeding 10   Bathing 0   Grooming Score 5   Dressing Score 5   Bladder Score 10   Bowels Score 10   Toilet Use Score 5   Transfers (Bed/Chair) Score 5   Mobility (Level Surface) Score 0   Stairs Score 0   Barthel Index Score 50   Licensure   [place] License Number  Lizeth Phillips MS OTR/L [id number]

## 2025-03-26 NOTE — PROGRESS NOTES
Progress Note - Hospitalist   Name: Naresh Carpio 64 y.o. male I MRN: [id number]  Unit/Bed#: [address] [place] [address] I Date of Admission: 3/24/2025   Date of Service: 3/26/2025 I Hospital Day: 1    Assessment & Plan  Closed fracture of second lumbar vertebra (HCC)  Patient presented to the ER after he fell in the kitchen on his side.  He could not get up and crawled over broken pieces of glass to get to his phone  CT scan shows acute L2 vertebral fracture  X-ray showed chronic right pubic ramus fracture  Pain control with Lidoderm patch, scheduled Tylenol, Robaxin and low-dose oxycodone as needed  LSO brace  PT/OT- recommending STR  ESRD (end stage renal disease) on dialysis ([facility])  Lab Results   Component Value Date    EGFR 8 03/26/2025    EGFR 6 03/25/2025    EGFR 4 03/24/2025    CREATININE 6.58 (H) 03/26/2025    CREATININE 8.11 (H) 03/25/2025    CREATININE 10.44 (H) 03/24/2025     TTS schedule as  outpatient  Noncompliant with dialysis sessions.  Missed dialysis on Tuesday and Thursday  S/p urgent HD on 3/25/25 for hyperkalemia  Next dialysis for Thursday  Nephrology is following  Abnormal involuntary movement (Resolved: 3/26/2025)  Patient with twitching/tremors and some speech difficulty.  Per review of records had similar findings in March 2024 as well  CT head negative for acute pathology  Possibly in the setting of gabapentin use  Now resolved s/p HD  Hyperkalemia  Potassium 6.5 on admission. Urgent dialysis session 3/25/25  Potassium normalized today 4.8  Monitor with daily BMP  Type 2 diabetes mellitus, without long-term current use of insulin ([facility])  Lab Results   Component Value Date    HGBA1C 5.7 (H) 02/09/2025       Recent Labs     03/24/25  1421   POCGLU 182*     A1c well-controlled  Not on medications at home  Diabetic diet  Hypertension  Continue Procardia XL  BP stable  Diabetic polyneuropathy associated with type 2 diabetes mellitus ([facility])  Decrease gabapentin dose to 100 mg 3 times a week after  dialysis  Paroxysmal atrial fibrillation (HCC)  Continue nifedipine  AC with Eliquis  Non-compliance with treatment  Patient noncompliant with dialysis sessions and states he stopped taking all of his medications except for nifedipine for about 1 month  Discussed with patient to be compliant with medication/dialysis  Chronic diastolic (congestive) heart failure (HCC)  Wt Readings from Last 3 Encounters:   03/24/25 107 kg (234 lb 12.6 oz)   02/27/25 102 kg (224 lb 13.9 oz)   02/18/25 107 kg (235 lb)     Volume management with dialysis   Daily weights, I&O  1800mL FR  Anemia in ESRD (end-stage renal disease)  (HCC)    Results from last 7 days   Lab Units 03/26/25  0519 03/25/25  0531 03/24/25  1434   HEMOGLOBIN g/dL 10.3* 9.5* 10.3*   HEMATOCRIT % 34.0* 31.1* 33.7*   MCV fL 96 95 95   Hemoglobin is stable  Daily CBC  Multiple open wounds of foot  Bilateral foot wounds, sustained from broken glass  Obtain XR BL foot  Wound care nurse consulted  Consult to Podiatry, recommendations are appreciated    VTE Pharmacologic Prophylaxis:   Moderate Risk (Score 3-4) - Pharmacological DVT Prophylaxis Ordered: apixaban (Eliquis).    Mobility:   Basic Mobility Inpatient Raw Score: 15  JH-HLM Goal: 4: Move to chair/commode  JH-HLM Achieved: 7: Walk 25 feet or more  JH-HLM Goal NOT achieved. Continue with multidisciplinary rounding and encourage appropriate mobility to improve upon JH-HLM goals.    Patient Centered Rounds: I performed bedside rounds with nursing staff today.   Discussions with Specialists or Other Care Team Provider: Nursing, Nephrology, CM    Education and Discussions with Family / Patient: Patient declined call to .     Current Length of Stay: 1 day(s)  Current Patient Status: Inpatient   Certification Statement: The patient will continue to require additional inpatient hospital stay due to fracture L2, ESRD  Discharge Plan: Anticipate discharge in 24-48 hrs to rehab facility.    Code Status: Level 1  - Full Code    Subjective   Patient feeling better today. Back pain is improving. No acute overnight events.     Objective :  Temp:  [97.3 °F (36.3 °C)-97.9 °F (36.6 °C)] 97.3 °F (36.3 °C)  HR:  [69-73] 73  BP: (135-139)/(57-64) 135/57  Resp:  [17-21] 20  SpO2:  [98 %-99 %] 99 %    Body mass index is 30.98 kg/m².     Input and Output Summary (last 24 hours):     Intake/Output Summary (Last 24 hours) at 3/26/2025 1746  Last data filed at 3/25/2025 1938  Gross per 24 hour   Intake --   Output 300 ml   Net -300 ml       Physical Exam  Vitals and nursing note reviewed.   Constitutional:       General: He is not in acute distress.     Appearance: He is well-developed.   HENT:      Head: Normocephalic and atraumatic.   Eyes:      Conjunctiva/sclera: Conjunctivae normal.   Cardiovascular:      Rate and Rhythm: Normal rate and regular rhythm.      Heart sounds: No murmur heard.  Pulmonary:      Effort: Pulmonary effort is normal. No respiratory distress.      Breath sounds: Normal breath sounds.   Abdominal:      Palpations: Abdomen is soft.      Tenderness: There is no abdominal tenderness.   Musculoskeletal:         General: No swelling.      Cervical back: Neck supple.   Skin:     General: Skin is warm and dry.   Neurological:      Mental Status: He is alert.   Psychiatric:         Mood and Affect: Mood normal.           Lines/Drains:  Lines/Drains/Airways       Active Status       Name Placement date Placement time Site Days    HD Permanent Double Catheter 08/30/23  --  Internal jugular  574                            Lab Results: I have reviewed the following results:   Results from last 7 days   Lab Units 03/26/25  0519 03/25/25  0531 03/24/25  1434   WBC Thousand/uL 7.90   < > 10.13   HEMOGLOBIN g/dL 10.3*   < > 10.3*   HEMATOCRIT % 34.0*   < > 33.7*   PLATELETS Thousands/uL 197   < > 182   SEGS PCT %  --   --  79*   LYMPHO PCT %  --   --  6*   MONO PCT %  --   --  11   EOS PCT %  --   --  3    < > = values in this  interval not displayed.     Results from last 7 days   Lab Units 03/26/25  0519 03/25/25  0531 03/24/25  1434   SODIUM mmol/L 131* 132* 133*   POTASSIUM mmol/L 4.8 5.4* 6.5*   CHLORIDE mmol/L 93* 97 100   CO2 mmol/L 22 21 19*   BUN mg/dL 46* 65* 95*   CREATININE mg/dL 6.58* 8.11* 10.44*   ANION GAP mmol/L 16* 14* 14*   CALCIUM mg/dL 8.2* 7.8* 7.6*   ALBUMIN g/dL  --  3.5 3.9   TOTAL BILIRUBIN mg/dL  --   --  0.42   ALK PHOS U/L  --   --  94   ALT U/L  --   --  17   AST U/L  --   --  13   GLUCOSE RANDOM mg/dL 105 118 169*         Results from last 7 days   Lab Units 03/24/25  1421   POC GLUCOSE mg/dl 182*               Recent Cultures (last 7 days):         Imaging Results Review: I reviewed radiology reports from this admission including: CT abdomen/pelvis, CT head, and xray(s).  Other Study Results Review: No additional pertinent studies reviewed.    Last 24 Hours Medication List:     Current Facility-Administered Medications:     acetaminophen (TYLENOL) tablet 975 mg, Q8H KEMAL    allopurinol (ZYLOPRIM) tablet 100 mg, Daily    apixaban (ELIQUIS) tablet 5 mg, BID    aspirin chewable tablet 81 mg, Daily    atorvastatin (LIPITOR) tablet 80 mg, Daily With Dinner    gabapentin (NEURONTIN) capsule 100 mg, Once per day on Tuesday Thursday Saturday    HYDROmorphone (DILAUDID) injection 0.5 mg, Q6H PRN    influenza vaccine, high-dose (Fluzone High-Dose) IM injection 0.5 mL, Once    lidocaine (LIDODERM) 5 % patch 1 patch, Daily    methocarbamol (ROBAXIN) tablet 500 mg, Q6H PRN    NIFEdipine (PROCARDIA XL) 24 hr tablet 30 mg, After Dinner    oxyCODONE (ROXICODONE) split tablet 2.5 mg, Q8H PRN    sevelamer (RENAGEL) tablet 800 mg, TID With Meals    Administrative Statements   Today, Patient Was Seen By: Winsome Esquivel PA-C  I have spent a total time of 30 minutes in caring for this patient on the day of the visit/encounter including Diagnostic results, Risks and benefits of tx options, Instructions for management, Patient and  family education, Counseling / Coordination of care, Documenting in the medical record, Reviewing/placing orders in the medical record (including tests, medications, and/or procedures), Obtaining or reviewing history  , and Communicating with other healthcare professionals .    **Please Note: This note may have been constructed using a voice recognition system.**

## 2025-03-26 NOTE — PROGRESS NOTES
NEPHROLOGY HOSPITAL PROGRESS NOTE   Naresh Carpio 64 y.o. male MRN: [id number]  Unit/Bed#: [address] [place] [address]- Encounter: [id number]  Reason for Consult: ESRD on HD    Assessment & Plan  ESRD (end stage renal disease) on dialysis ([facility])  TTS at [facility] in [place]  Access: Right IJ permacath  Status post HD yesterday  Electrolytes and volume status stable today  Next HD tomorrow  Hypertension  Continue home medications  Anemia in ESRD (end-stage renal disease)  ([facility])  Hemoglobin today 10.3 at goal  Not on SURINDER as outpatient  Abnormal involuntary movement  This may be related to gabapentin use  Currently resolved with back-to-back HD  Continue current dose of gabapentin (100 mg 3 times per week after dialysis)  Type 2 diabetes mellitus, without long-term current use of insulin ([facility])  Management per primary team  Hyponatremia  Sodium level today 131  Due to lack of free water clearance  UF with HD  Continue 138 mEq bath on HD  Chronic diastolic (congestive) heart failure ([facility])  UF with HD  Paroxysmal atrial fibrillation ([facility])  Management per primary team   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, on chronic dialysis ([facility])  Continue sevelamer for hyperphosphatemia  Hyperkalemia  This is resolved with HD  Closed fracture of second lumbar vertebra ([facility])  Management per primary team  Non-compliance with treatment  Noted    I have reviewed the nephrology recommendations including resolution of twitching after dialysis and not to increase the dose of gabapentin further, with IM team, and we are in agreement with renal plan including the information outlined above.    SUBJECTIVE / 24H INTERVAL HISTORY:  Status post hemodialysis yesterday with 3 L UF.  Involuntary twitching is resolved.  Denies dyspnea.    OBJECTIVE:  Current Weight: Weight - Scale: 107 kg (234 lb 12.6 oz)  Vitals:    03/25/25 1613 03/25/25 1755 03/25/25 2149 03/26/25 0904   BP: 139/98 139/64 137/58 135/57   BP Location:  Right arm      Pulse: 76  69    Resp: 18 21 18 17   Temp: 97.5 °F (36.4 °C) 97.7 °F (36.5 °C) 97.9 °F (36.6 °C) 97.9 °F (36.6 °C)   TempSrc: Tympanic      SpO2: 97%  98%    Weight:       Height:           Intake/Output Summary (Last 24 hours) at 3/26/2025 0949  Last data filed at 3/25/2025 1938  Gross per 24 hour   Intake 500 ml   Output 4011 ml   Net -3511 ml     Review of Systems   Constitutional:  Negative for chills and fever.   HENT:  Negative for ear pain and sore throat.    Eyes:  Negative for pain and visual disturbance.   Respiratory:  Negative for cough and shortness of breath.    Cardiovascular:  Negative for chest pain and palpitations.   Gastrointestinal:  Negative for abdominal pain and vomiting.   Genitourinary:  Negative for dysuria and hematuria.   Musculoskeletal:  Negative for arthralgias and back pain.   Skin:  Negative for color change and rash.   Neurological:  Negative for seizures and syncope.   All other systems reviewed and are negative.     Physical Exam  Vitals and nursing note reviewed.   Constitutional:       General: He is not in acute distress.     Appearance: He is well-developed.   HENT:      Head: Normocephalic and atraumatic.   Eyes:      Conjunctiva/sclera: Conjunctivae normal.   Cardiovascular:      Rate and Rhythm: Normal rate and regular rhythm.      Pulses: Normal pulses.      Heart sounds: Normal heart sounds. No murmur heard.     Comments: Chest wall permacath present  Pulmonary:      Effort: Pulmonary effort is normal. No respiratory distress.      Breath sounds: Normal breath sounds.   Abdominal:      Palpations: Abdomen is soft.      Tenderness: There is no abdominal tenderness.   Musculoskeletal:         General: No swelling.      Cervical back: Neck supple.      Right lower leg: Edema present.      Left lower leg: Edema present.   Skin:     General: Skin is warm and dry.      Capillary Refill: Capillary refill takes less than 2 seconds.   Neurological:      Mental  Status: He is alert.   Psychiatric:         Mood and Affect: Mood normal.       Medications:    Current Facility-Administered Medications:     acetaminophen (TYLENOL) tablet 975 mg, 975 mg, Oral, Q8H KEMAL, Lore Revankar, DO, 975 mg at 03/26/25 0515    allopurinol (ZYLOPRIM) tablet 100 mg, 100 mg, Oral, Daily, Lore Revankar, DO, 100 mg at 03/26/25 0905    apixaban (ELIQUIS) tablet 5 mg, 5 mg, Oral, BID, Lore Revankar, DO, 5 mg at 03/26/25 0905    aspirin chewable tablet 81 mg, 81 mg, Oral, Daily, Lore Revankar, DO, 81 mg at 03/26/25 0905    atorvastatin (LIPITOR) tablet 80 mg, 80 mg, Oral, Daily With Dinner, Lore Revankar, DO, 80 mg at 03/25/25 1727    gabapentin (NEURONTIN) capsule 100 mg, 100 mg, Oral, Once per day on Tuesday Thursday Saturday, Lore Revankar, DO, 100 mg at 03/25/25 1255    HYDROmorphone (DILAUDID) injection 0.5 mg, 0.5 mg, Intravenous, Q6H PRN, Nallely Pisano PA-C, 0.5 mg at 03/26/25 0907    influenza vaccine, high-dose (Fluzone High-Dose) IM injection 0.5 mL, 0.5 mL, Intramuscular, Once, Lore Revankar, DO    lidocaine (LIDODERM) 5 % patch 1 patch, 1 patch, Topical, Daily, Lore Revankar, DO    methocarbamol (ROBAXIN) tablet 500 mg, 500 mg, Oral, Q6H PRN, Lore Revankar, DO, 500 mg at 03/25/25 0948    NIFEdipine (PROCARDIA XL) 24 hr tablet 30 mg, 30 mg, Oral, After Dinner, Lore Revankar, DO, 30 mg at 03/25/25 1727    oxyCODONE (ROXICODONE) split tablet 2.5 mg, 2.5 mg, Oral, Q8H PRN, Lore Revankar, DO, 2.5 mg at 03/26/25 0453    sevelamer (RENAGEL) tablet 800 mg, 800 mg, Oral, TID With Meals, Lore DO Nadeem, 800 mg at 03/26/25 0905    Laboratory Results:  Results from last 7 days   Lab Units 03/26/25  0519 03/25/25  0531 03/24/25  1434   WBC Thousand/uL 7.90 6.98 10.13   HEMOGLOBIN g/dL 10.3* 9.5* 10.3*   HEMATOCRIT % 34.0* 31.1* 33.7*   PLATELETS Thousands/uL 197 178 182   POTASSIUM mmol/L 4.8 5.4* 6.5*   CHLORIDE mmol/L 93* 97 100   CO2 mmol/L 22 21 19*   BUN mg/dL 46* 65*  "95*   CREATININE mg/dL 6.58* 8.11* 10.44*   CALCIUM mg/dL 8.2* 7.8* 7.6*   PHOSPHORUS mg/dL  --  6.3*  --        Portions of the record may have been created with voice recognition software. Occasional wrong word or \"sound a like\" substitutions may have occurred due to the inherent limitations of voice recognition software. Read the chart carefully and recognize, using context, where substitutions have occurred.If you have any questions, please contact the dictating provider.\    "

## 2025-03-26 NOTE — ASSESSMENT & PLAN NOTE
Patient with twitching/tremors and some speech difficulty.  Per review of records had similar findings in March 2024 as well  CT head negative for acute pathology  Possibly in the setting of gabapentin use  Now resolved s/p HD

## 2025-03-26 NOTE — ASSESSMENT & PLAN NOTE
Sodium level today 131  Due to lack of free water clearance  UF with HD  Continue 138 mEq bath on HD

## 2025-03-26 NOTE — WOUND OSTOMY CARE
Progress Note - Wound   Naresh Carpio 64 y.o. male MRN: [id number]  Unit/Bed#: [address] [place] [address] Encounter: [id number]        Assessment:   This is a 64 year old admitted with closed fracture of 2nd lumbar vertebra s/p fall at home. He has a history of ESRD and has missed a number of dialysis treatments over the last several weeks. Patient was AAO x 3 and agreeable to have wound visit done. He is continent of urine with no record of BM.    Assessment Findings:  1-Full thickness wounds to bilateral feet, right lower leg and toes of left foot due to broken glass after sustaining a fall. Orders are in place for wound care. X-rays and/or Podiatry consult recommended to Provider.  2-Bilateral heels intact - patient refused inspection of sacrobuttocks. Orders in place for prevention.    Wound Care Plan:  1-Cleanse wounds to left lateral foot, right medial foot, left medial foot and left anterior lower leg with NSS & pat dry. Apply thin layer of Normlgel to wounds, Adaptic cut to size of wounds, ABD, rolled gauze and tape. Change every other day & prn soilage/dislodgement.  2-Cleanse wounds to left 2nd toe and left 1st toe with NSS & pat dry. Paint wounds with betadine, cover with ABD, rolled gauze and tape. Change every other day & prn soilage/dislodgement.  3-Apply Prevent barrier cream to bilateral sacrum, buttock BID and PRN and to bilateral heels with dressing changes.  4-Float heels on 2 pillows to offload pressure so heels are not in contact with mattress or pillows.  5-Ehob pressure redistribution cushion in chair when out of bed. Avoid prolonged sitting.  6-Moisturize skin daily with skin nourishing cream.  7-Turn/reposition q2h or when medically stable for pressure re-distribution on skin.     Wound 02/18/25 Diabetic Ulcer Toe D1, great Left;Medial (Active)   Wound Image   03/26/25 1005   Wound Description Black;Eschar;Granulation tissue;Beefy red 03/26/25 1005   Non-staged Wound Description Full thickness  03/26/25 1005   Wound Length (cm) 1 cm 03/26/25 1005   Wound Width (cm) 2.2 cm 03/26/25 1005   Wound Depth (cm) 0.1 cm 03/26/25 1005   Wound Surface Area (cm^2) 2.2 cm^2 03/26/25 1005   Wound Volume (cm^3) 0.22 cm^3 03/26/25 1005   Calculated Wound Volume (cm^3) 0.22 cm^3 03/26/25 1005   Drainage Amount Small 03/26/25 1005   Drainage Description Serosanguineous 03/26/25 1005   Liliana-wound Assessment Edema;Intact 03/26/25 1005   Treatments Cleansed 03/26/25 1005   Dressing Gauze;Dry dressing; Normlgel;Adaptic 03/26/25 1005   Dressing Changed New 03/26/25 1005   Dressing Status Clean;Dry;Intact 03/26/25 1005       Wound 03/24/25 Diabetic Ulcer Foot Right;Medial (Active)   Wound Image    03/26/25 0958   Wound Description Granulation tissue;Beefy red;Black;Eschar 03/26/25 0958   Non-staged Wound Description Full thickness 03/26/25 0958   Wound Length (cm) 0.5 cm 03/26/25 0958   Wound Width (cm) 2 cm 03/26/25 0958   Wound Depth (cm) 0.1 cm 03/26/25 0958   Wound Surface Area (cm^2) 1 cm^2 03/26/25 0958   Wound Volume (cm^3) 0.1 cm^3 03/26/25 0958   Calculated Wound Volume (cm^3) 0.1 cm^3 03/26/25 0958   Drainage Amount Large 03/26/25 0958   Drainage Description Serosanguineous 03/26/25 0958   Liliana-wound Assessment Intact;Purple 03/26/25 0958   Treatments Cleansed 03/26/25 0958   Dressing ABD;Dry dressing;Normlgel 03/26/25 0958   Dressing Changed New 03/26/25 0958   Dressing Status Clean;Dry;Intact 03/26/25 0958       Wound 03/24/25 Diabetic Ulcer Foot Left;Medial (Active)   Wound Image   03/26/25 1003   Wound Description Slough;Yellow;Granulation tissue;Pink 03/26/25 1003   Non-staged Wound Description Full thickness 03/26/25 1003   Wound Length (cm) 1 cm 03/26/25 1003   Wound Width (cm) 1.5 cm 03/26/25 1003   Wound Depth (cm) 0.1 cm 03/26/25 1003   Wound Surface Area (cm^2) 1.5 cm^2 03/26/25 1003   Wound Volume (cm^3) 0.15 cm^3 03/26/25 1003   Calculated Wound Volume (cm^3) 0.15 cm^3 03/26/25 1003   Drainage Amount Small  03/26/25 1003   Drainage Description Serosanguineous 03/26/25 1003   Liliana-wound Assessment Excoriated 03/26/25 1003   Treatments Cleansed 03/26/25 1003   Dressing ABD;Dry dressing; Normlgel;Adaptic 03/26/25 1003   Dressing Changed New 03/26/25 1003   Dressing Status Clean;Dry;Intact 03/26/25 1003       Wound 03/26/25 Diabetic Ulcer Foot Left;Lateral (Active)   Wound Image   03/26/25 1010   Wound Description Granulation tissue;Beefy red;Black;Eschar 03/26/25 1010   Non-staged Wound Description Full thickness 03/26/25 1010   Wound Length (cm) 0.1 cm 03/26/25 1010   Wound Width (cm) 0.1 cm 03/26/25 1010   Wound Depth (cm) 0.1 cm 03/26/25 1010   Wound Surface Area (cm^2) 0.01 cm^2 03/26/25 1010   Wound Volume (cm^3) 0.001 cm^3 03/26/25 1010   Calculated Wound Volume (cm^3) 0 cm^3 03/26/25 1010   Drainage Amount Small 03/26/25 1010   Drainage Description Sanguineous 03/26/25 1010   Liliana-wound Assessment Excoriated 03/26/25 1010   Treatments Cleansed 03/26/25 1010   Dressing Normlgel;Adaptic;ABD;Dry dressing 03/26/25 1010   Dressing Changed New 03/26/25 1010   Dressing Status Clean;Dry;Intact 03/26/25 1010       Wound 03/26/25 Diabetic Ulcer Toe D2, second Anterior;Left (Active)   Wound Image   03/26/25 1009   Wound Description Dry;Black;Brown;Eschar 03/26/25 1009   Non-staged Wound Description Full thickness 03/26/25 1009   Wound Length (cm) 1.4 cm 03/26/25 1009   Wound Width (cm) 1 cm 03/26/25 1009   Wound Depth (cm) 0.1 cm 03/26/25 1009   Wound Surface Area (cm^2) 1.4 cm^2 03/26/25 1009   Wound Volume (cm^3) 0.14 cm^3 03/26/25 1009   Calculated Wound Volume (cm^3) 0.14 cm^3 03/26/25 1009   Drainage Amount None 03/26/25 1009   Liliana-wound Assessment Intact 03/26/25 1009   Treatments Cleansed 03/26/25 1009   Dressing Betadine;Gauze;Dry dressing 03/26/25 1009   Dressing Changed New 03/26/25 1009   Dressing Status Clean;Dry;Intact 03/26/25 1009       Wound 03/26/25 Traumatic Leg Right;Anterior (Active)   Wound Image    03/26/25 1002   Wound Description Granulation tissue;Pink;Slough;Yellow 03/26/25 1002   Non-staged Wound Description Full thickness 03/26/25 1002   Wound Length (cm) 0.8 cm 03/26/25 1002   Wound Width (cm) 1.3 cm 03/26/25 1002   Wound Depth (cm) 0.1 cm 03/26/25 1002   Wound Surface Area (cm^2) 1.04 cm^2 03/26/25 1002   Wound Volume (cm^3) 0.104 cm^3 03/26/25 1002   Calculated Wound Volume (cm^3) 0.1 cm^3 03/26/25 1002   Drainage Amount None 03/26/25 1002   Liliana-wound Assessment Intact;Edema;Erythema 03/26/25 1002   Treatments Cleansed 03/26/25 1002   Dressing Normlgel;Adaptic;ABD;Dry dressing 03/26/25 1002   Dressing Changed New 03/26/25 1002   Dressing Status Clean;Dry;Intact 03/26/25 1002     Discussed assessment findings, and plan of care/recommendations with Rosa RAWLS.    Wound care will follow along with patient throughout admission or until seen by Podiatry, please call or text with questions and concerns.    Recommendations written as orders.  Love Andrew MSN, RN, CWON

## 2025-03-26 NOTE — ASSESSMENT & PLAN NOTE
Lab Results   Component Value Date    HGBA1C 5.7 (H) 02/09/2025       Recent Labs     03/24/25  1421   POCGLU 182*     A1c well-controlled  Not on medications at home  Diabetic diet

## 2025-03-26 NOTE — ASSESSMENT & PLAN NOTE
TTS at [facility] [place] in [place]  Access: Right IJ permacath  Status post HD yesterday  Electrolytes and volume status stable today  Next HD tomorrow

## 2025-03-26 NOTE — ASSESSMENT & PLAN NOTE
Potassium 6.5 on admission. Urgent dialysis session 3/25/25  Potassium normalized today 4.8  Monitor with daily BMP

## 2025-03-26 NOTE — ASSESSMENT & PLAN NOTE
Bilateral foot wounds, sustained from broken glass  Obtain XR BL foot  Wound care nurse consulted  Consult to Podiatry, recommendations are appreciated

## 2025-03-27 ENCOUNTER — APPOINTMENT (INPATIENT)
Dept: RADIOLOGY | Facility: HOSPITAL | Age: 65
DRG: 463 | End: 2025-03-27
Payer: MEDICARE

## 2025-03-27 ENCOUNTER — APPOINTMENT (INPATIENT)
Dept: DIALYSIS | Facility: HOSPITAL | Age: 65
DRG: 463 | End: 2025-03-27
Attending: FAMILY MEDICINE
Payer: MEDICARE

## 2025-03-27 LAB
ANION GAP SERPL CALCULATED.3IONS-SCNC: 17 MMOL/L (ref 4–13)
BUN SERPL-MCNC: 67 MG/DL (ref 5–25)
CALCIUM SERPL-MCNC: 8.4 MG/DL (ref 8.4–10.2)
CHLORIDE SERPL-SCNC: 94 MMOL/L (ref 96–108)
CO2 SERPL-SCNC: 21 MMOL/L (ref 21–32)
CREAT SERPL-MCNC: 8.23 MG/DL (ref 0.6–1.3)
ERYTHROCYTE [DISTWIDTH] IN BLOOD BY AUTOMATED COUNT: 17.2 % (ref 11.6–15.1)
GFR SERPL CREATININE-BSD FRML MDRD: 6 ML/MIN/1.73SQ M
GLUCOSE SERPL-MCNC: 107 MG/DL (ref 65–140)
HCT VFR BLD AUTO: 33 % (ref 36.5–49.3)
HGB BLD-MCNC: 10.1 G/DL (ref 12–17)
MCH RBC QN AUTO: 28.9 PG (ref 26.8–34.3)
MCHC RBC AUTO-ENTMCNC: 30.6 G/DL (ref 31.4–37.4)
MCV RBC AUTO: 95 FL (ref 82–98)
MRSA NOSE QL CULT: ABNORMAL
MRSA NOSE QL CULT: ABNORMAL
PLATELET # BLD AUTO: 239 THOUSANDS/UL (ref 149–390)
PMV BLD AUTO: 10 FL (ref 8.9–12.7)
POTASSIUM SERPL-SCNC: 5.3 MMOL/L (ref 3.5–5.3)
RBC # BLD AUTO: 3.49 MILLION/UL (ref 3.88–5.62)
SODIUM SERPL-SCNC: 132 MMOL/L (ref 135–147)
WBC # BLD AUTO: 7.87 THOUSAND/UL (ref 4.31–10.16)

## 2025-03-27 PROCEDURE — 99222 1ST HOSP IP/OBS MODERATE 55: CPT | Performed by: STUDENT IN AN ORGANIZED HEALTH CARE EDUCATION/TRAINING PROGRAM

## 2025-03-27 PROCEDURE — 85027 COMPLETE CBC AUTOMATED: CPT | Performed by: INTERNAL MEDICINE

## 2025-03-27 PROCEDURE — 11042 DBRDMT SUBQ TIS 1ST 20SQCM/<: CPT | Performed by: STUDENT IN AN ORGANIZED HEALTH CARE EDUCATION/TRAINING PROGRAM

## 2025-03-27 PROCEDURE — 80048 BASIC METABOLIC PNL TOTAL CA: CPT | Performed by: INTERNAL MEDICINE

## 2025-03-27 PROCEDURE — 99232 SBSQ HOSP IP/OBS MODERATE 35: CPT | Performed by: INTERNAL MEDICINE

## 2025-03-27 PROCEDURE — 5A1D70Z PERFORMANCE OF URINARY FILTRATION, INTERMITTENT, LESS THAN 6 HOURS PER DAY: ICD-10-PCS | Performed by: INTERNAL MEDICINE

## 2025-03-27 PROCEDURE — 0JBR0ZZ EXCISION OF LEFT FOOT SUBCUTANEOUS TISSUE AND FASCIA, OPEN APPROACH: ICD-10-PCS | Performed by: STUDENT IN AN ORGANIZED HEALTH CARE EDUCATION/TRAINING PROGRAM

## 2025-03-27 PROCEDURE — 99232 SBSQ HOSP IP/OBS MODERATE 35: CPT | Performed by: STUDENT IN AN ORGANIZED HEALTH CARE EDUCATION/TRAINING PROGRAM

## 2025-03-27 RX ADMIN — SEVELAMER HYDROCHLORIDE 800 MG: 800 TABLET, FILM COATED ORAL at 08:42

## 2025-03-27 RX ADMIN — METHOCARBAMOL 500 MG: 500 TABLET ORAL at 00:42

## 2025-03-27 RX ADMIN — Medication 2.5 MG: at 13:43

## 2025-03-27 RX ADMIN — ASPIRIN 81 MG CHEWABLE TABLET 81 MG: 81 TABLET CHEWABLE at 08:42

## 2025-03-27 RX ADMIN — METHOCARBAMOL 500 MG: 500 TABLET ORAL at 11:48

## 2025-03-27 RX ADMIN — GABAPENTIN 100 MG: 100 CAPSULE ORAL at 08:42

## 2025-03-27 RX ADMIN — SEVELAMER HYDROCHLORIDE 800 MG: 800 TABLET, FILM COATED ORAL at 15:40

## 2025-03-27 RX ADMIN — SEVELAMER HYDROCHLORIDE 800 MG: 800 TABLET, FILM COATED ORAL at 11:48

## 2025-03-27 RX ADMIN — ACETAMINOPHEN 975 MG: 325 TABLET ORAL at 21:51

## 2025-03-27 RX ADMIN — ACETAMINOPHEN 975 MG: 325 TABLET ORAL at 13:44

## 2025-03-27 RX ADMIN — ALLOPURINOL 100 MG: 100 TABLET ORAL at 08:42

## 2025-03-27 RX ADMIN — Medication 2.5 MG: at 21:51

## 2025-03-27 RX ADMIN — HYDROMORPHONE HYDROCHLORIDE 0.5 MG: 1 INJECTION, SOLUTION INTRAMUSCULAR; INTRAVENOUS; SUBCUTANEOUS at 08:51

## 2025-03-27 RX ADMIN — ATORVASTATIN CALCIUM 80 MG: 80 TABLET, FILM COATED ORAL at 15:40

## 2025-03-27 RX ADMIN — HYDROMORPHONE HYDROCHLORIDE 0.5 MG: 1 INJECTION, SOLUTION INTRAMUSCULAR; INTRAVENOUS; SUBCUTANEOUS at 23:06

## 2025-03-27 RX ADMIN — HYDROMORPHONE HYDROCHLORIDE 0.5 MG: 1 INJECTION, SOLUTION INTRAMUSCULAR; INTRAVENOUS; SUBCUTANEOUS at 15:37

## 2025-03-27 RX ADMIN — METHOCARBAMOL 500 MG: 500 TABLET ORAL at 19:53

## 2025-03-27 RX ADMIN — APIXABAN 5 MG: 5 TABLET, FILM COATED ORAL at 08:42

## 2025-03-27 RX ADMIN — Medication 2.5 MG: at 06:10

## 2025-03-27 RX ADMIN — APIXABAN 5 MG: 5 TABLET, FILM COATED ORAL at 17:55

## 2025-03-27 RX ADMIN — NIFEDIPINE 30 MG: 30 TABLET, EXTENDED RELEASE ORAL at 17:55

## 2025-03-27 RX ADMIN — LIDOCAINE 1 PATCH: 700 PATCH TOPICAL at 13:46

## 2025-03-27 RX ADMIN — ACETAMINOPHEN 975 MG: 325 TABLET ORAL at 06:10

## 2025-03-27 NOTE — PROGRESS NOTES
Progress Note - Hospitalist   Name: Naresh Carpio 64 y.o. male I MRN: [id number]  Unit/Bed#: [address] [place] [address] I Date of Admission: 3/24/2025   Date of Service: 3/27/2025 I Hospital Day: 2    Assessment & Plan  Closed fracture of second lumbar vertebra (HCC)  Patient presented to the ER after he fell in the kitchen on his side.  He could not get up and crawled over broken pieces of glass to get to his phone  CT scan shows acute L2 vertebral fracture  X-ray showed chronic right pubic ramus fracture  Pain control with Lidoderm patch, scheduled Tylenol, Robaxin and low-dose oxycodone as needed  LSO brace  PT/OT- recommending STR  Multiple open wounds of foot  Bilateral foot wounds, sustained from broken glass  Follow up XR BL foot  Wound care nurse consulted  Consult to Podiatry, recommendations are appreciated  ESRD (end stage renal disease) on dialysis ([facility])  Lab Results   Component Value Date    EGFR 6 03/27/2025    EGFR 8 03/26/2025    EGFR 6 03/25/2025    CREATININE 8.23 (H) 03/27/2025    CREATININE 6.58 (H) 03/26/2025    CREATININE 8.11 (H) 03/25/2025     TTS schedule as outpatient  Noncompliant with dialysis sessions.  Missed dialysis on Tuesday and Thursday  S/p urgent HD on 3/25/25 for hyperkalemia  Next dialysis today 3/27/25  Nephrology is following  Hyperkalemia  Potassium 6.5 on admission. Urgent dialysis session 3/25/25  Potassium normalized   Monitor with daily BMP  Type 2 diabetes mellitus, without long-term current use of insulin ([facility])  Lab Results   Component Value Date    HGBA1C 5.7 (H) 02/09/2025     A1c well-controlled  Not on medications at home  Diabetic diet  Hypertension  Continue Procardia XL  BP stable  Diabetic polyneuropathy associated with type 2 diabetes mellitus ([facility])  Continue gabapentin dose to 100 mg 3 times a week after dialysis  Paroxysmal atrial fibrillation ([facility])  Continue nifedipine  AC with Eliquis  Non-compliance with treatment  Patient noncompliant with dialysis sessions and  states he stopped taking all of his medications except for nifedipine for about 1 month  Discussed with patient to be compliant with medication/dialysis  Chronic diastolic (congestive) heart failure (HCC)  Wt Readings from Last 3 Encounters:   03/24/25 107 kg (234 lb 12.6 oz)   02/27/25 102 kg (224 lb 13.9 oz)   02/18/25 107 kg (235 lb)     Volume management with dialysis   Daily weights, I&O  1800mL FR  Anemia in ESRD (end-stage renal disease)  (HCC)    Results from last 7 days   Lab Units 03/27/25  0444 03/26/25  0519 03/25/25  0531 03/24/25  1434   HEMOGLOBIN g/dL 10.1* 10.3* 9.5* 10.3*   HEMATOCRIT % 33.0* 34.0* 31.1* 33.7*   MCV fL 95 96 95 95   Hemoglobin is stable  Daily CBC    VTE Pharmacologic Prophylaxis:   Moderate Risk (Score 3-4) - Pharmacological DVT Prophylaxis Ordered: apixaban (Eliquis).    Mobility:   Basic Mobility Inpatient Raw Score: 15  JH-HLM Goal: 4: Move to chair/commode  JH-HLM Achieved: 7: Walk 25 feet or more  JH-HLM Goal NOT achieved. Continue with multidisciplinary rounding and encourage appropriate mobility to improve upon JH-HLM goals.    Patient Centered Rounds: I performed bedside rounds with nursing staff today.   Discussions with Specialists or Other Care Team Provider: Nursing, Podiatry, Nephrology    Education and Discussions with Family / Patient: Patient declined call to .     Current Length of Stay: 2 day(s)  Current Patient Status: Inpatient   Certification Statement: The patient will continue to require additional inpatient hospital stay due to dialysis, foot wounds, placement  Discharge Plan: Anticipate discharge in 24-48 hrs to rehab facility.    Code Status: Level 1 - Full Code    Subjective   Patient feels tired today. Plan for dialysis. No acute events overnight.     Objective :  Temp:  [97.3 °F (36.3 °C)-98.2 °F (36.8 °C)] 97.6 °F (36.4 °C)  HR:  [69-77] 69  BP: (135-166)/(57-65) 151/61  Resp:  [17-20] 18  SpO2:  [94 %-99 %] 96 %  O2 Device: None (Room  air)    Body mass index is 30.98 kg/m².     Input and Output Summary (last 24 hours):     Intake/Output Summary (Last 24 hours) at 3/27/2025 1047  Last data filed at 3/27/2025 1002  Gross per 24 hour   Intake 200 ml   Output 250 ml   Net -50 ml       Physical Exam  Vitals and nursing note reviewed.   Constitutional:       General: He is not in acute distress.     Appearance: He is well-developed.      Comments: Chronically ill-appearing   HENT:      Head: Normocephalic and atraumatic.   Eyes:      Conjunctiva/sclera: Conjunctivae normal.   Cardiovascular:      Rate and Rhythm: Normal rate and regular rhythm.      Heart sounds: No murmur heard.  Pulmonary:      Effort: Pulmonary effort is normal. No respiratory distress.      Breath sounds: Normal breath sounds.   Abdominal:      Palpations: Abdomen is soft.      Tenderness: There is no abdominal tenderness.   Musculoskeletal:         General: No swelling.      Cervical back: Neck supple.   Feet:      Comments: Bilateral foot wounds sustained from glass. No evidence of surrounding infection  Skin:     General: Skin is warm and dry.   Neurological:      Mental Status: He is alert.   Psychiatric:         Mood and Affect: Mood normal.           Lines/Drains:  Lines/Drains/Airways       Active Status       Name Placement date Placement time Site Days    HD Permanent Double Catheter 08/30/23  --  Internal jugular  575                            Lab Results: I have reviewed the following results:   Results from last 7 days   Lab Units 03/27/25 0444 03/25/25  0531 03/24/25  1434   WBC Thousand/uL 7.87   < > 10.13   HEMOGLOBIN g/dL 10.1*   < > 10.3*   HEMATOCRIT % 33.0*   < > 33.7*   PLATELETS Thousands/uL 239   < > 182   SEGS PCT %  --   --  79*   LYMPHO PCT %  --   --  6*   MONO PCT %  --   --  11   EOS PCT %  --   --  3    < > = values in this interval not displayed.     Results from last 7 days   Lab Units 03/27/25  0444 03/26/25  0519 03/25/25  0531 03/24/25  1434    SODIUM mmol/L 132*   < > 132* 133*   POTASSIUM mmol/L 5.3   < > 5.4* 6.5*   CHLORIDE mmol/L 94*   < > 97 100   CO2 mmol/L 21   < > 21 19*   BUN mg/dL 67*   < > 65* 95*   CREATININE mg/dL 8.23*   < > 8.11* 10.44*   ANION GAP mmol/L 17*   < > 14* 14*   CALCIUM mg/dL 8.4   < > 7.8* 7.6*   ALBUMIN g/dL  --   --  3.5 3.9   TOTAL BILIRUBIN mg/dL  --   --   --  0.42   ALK PHOS U/L  --   --   --  94   ALT U/L  --   --   --  17   AST U/L  --   --   --  13   GLUCOSE RANDOM mg/dL 107   < > 118 169*    < > = values in this interval not displayed.         Results from last 7 days   Lab Units 03/24/25  1421   POC GLUCOSE mg/dl 182*               Recent Cultures (last 7 days):         Imaging Results Review: I reviewed radiology reports from this admission including: xray(s).  Other Study Results Review: No additional pertinent studies reviewed.    Last 24 Hours Medication List:     Current Facility-Administered Medications:     acetaminophen (TYLENOL) tablet 975 mg, Q8H KEMAL    allopurinol (ZYLOPRIM) tablet 100 mg, Daily    apixaban (ELIQUIS) tablet 5 mg, BID    aspirin chewable tablet 81 mg, Daily    atorvastatin (LIPITOR) tablet 80 mg, Daily With Dinner    gabapentin (NEURONTIN) capsule 100 mg, Once per day on Tuesday Thursday Saturday    HYDROmorphone (DILAUDID) injection 0.5 mg, Q6H PRN    influenza vaccine, high-dose (Fluzone High-Dose) IM injection 0.5 mL, Once    lidocaine (LIDODERM) 5 % patch 1 patch, Daily    methocarbamol (ROBAXIN) tablet 500 mg, Q6H PRN    NIFEdipine (PROCARDIA XL) 24 hr tablet 30 mg, After Dinner    oxyCODONE (ROXICODONE) split tablet 2.5 mg, Q8H PRN    sevelamer (RENAGEL) tablet 800 mg, TID With Meals    Administrative Statements   Today, Patient Was Seen By: Winsome Esquivel PA-C  I have spent a total time of 30 minutes in caring for this patient on the day of the visit/encounter including Diagnostic results, Risks and benefits of tx options, Instructions for management, Patient and family education,  Counseling / Coordination of care, Documenting in the medical record, Reviewing/placing orders in the medical record (including tests, medications, and/or procedures), Obtaining or reviewing history  , and Communicating with other healthcare professionals .    **Please Note: This note may have been constructed using a voice recognition system.**

## 2025-03-27 NOTE — PLAN OF CARE
Target UF Goal 4 L as tolerated. Patient dialyzing for  3.5 hours  on 2 K bath for serum K of  5.3  per protocol. Treatment plan reviewed with Nephrology.   Problem: METABOLIC, FLUID AND ELECTROLYTES - ADULT  Goal: Electrolytes maintained within normal limits  Description: INTERVENTIONS:- Monitor labs and assess patient for signs and symptoms of electrolyte imbalances- Administer electrolyte replacement as ordered- Monitor response to electrolyte replacements, including repeat lab results as appropriate- Instruct patient on fluid and nutrition as appropriate  INTERVENTIONS:- Monitor labs and assess patient for signs and symptoms of electrolyte imbalances- Administer electrolyte replacement as ordered- Monitor response to electrolyte replacements, including repeat lab results as appropriate- Instruct patient on fluid and nutrition as appropriate  Outcome: Progressing  Goal: Fluid balance maintained  Description: INTERVENTIONS:- Monitor labs - Monitor I/O and WT- Instruct patient on fluid and nutrition as appropriate- Assess for signs & symptoms of volume excess or deficit  Outcome: Progressing   Post-Dialysis RN Treatment Note    Blood Pressure:  Pre 155/63 mm/Hg  Post 151/65 mmHg   EDW:  99 kg    Weight:  Pre 103.5 kg bed scale   Post 101.5 kg   Mode of weight measurement: Standing Scale   Volume Removed:  2240 ml    Treatment duration: 148 minutes    NS given:  Yes, 200 ml NSS given due to sudden drop of BP and pt. C/o feeling nauseous and dizzy    Treatment shortened Yes, describe: 62 minutes off early as per patient request due to severe pain    Medications given during Rx: None Reported   Estimated Kt/V:  0.82   Access type: Permacath/TDC   Needle Gauge:  na   Access Issues: No    Report called to primary nurse:   Yes Tressa Moran RN

## 2025-03-27 NOTE — ASSESSMENT & PLAN NOTE
#ESRD on HD TTS:  Dialysis unit/days: [facility]  Access: PermCath  Recurrent admissions  Next dialysis today as per schedule, TTS  Renal Diet  Fluid restriction 1-1.5L/d  Adjust medications to GFR<10  Avoid opioids

## 2025-03-27 NOTE — ASSESSMENT & PLAN NOTE
Serum sodium 132  Secondary to decreased free water excretion in the settings of ESRD  Will be addressed on dialysis

## 2025-03-27 NOTE — PHYSICAL THERAPY NOTE
PT Cancellation Note       03/27/25 1331   Note Type   Note Type Cancelled Session   Cancel Reasons Patient off floor/hemodialysis  (Pt getting bedside dialysis. Will follow-up as schedule allows.)   Licensure   [place] License Number  Lizeth Gill [id number]

## 2025-03-27 NOTE — ASSESSMENT & PLAN NOTE
Volume: Euvolemic  Blood pressure: Normotensive, /50  Low-sodium diet  Fluid restriction 1.8 L  UF on HD as tolerates  Nifedipine 30

## 2025-03-27 NOTE — ASSESSMENT & PLAN NOTE
Lab Results   Component Value Date    EGFR 6 03/27/2025    EGFR 8 03/26/2025    EGFR 6 03/25/2025    CREATININE 8.23 (H) 03/27/2025    CREATININE 6.58 (H) 03/26/2025    CREATININE 8.11 (H) 03/25/2025     TTS schedule as outpatient  Noncompliant with dialysis sessions.  Missed dialysis on Tuesday and Thursday  S/p urgent HD on 3/25/25 for hyperkalemia  Next dialysis today 3/27/25  Nephrology is following

## 2025-03-27 NOTE — ASSESSMENT & PLAN NOTE
Bilateral foot wounds, sustained from broken glass  Follow up XR BL foot  Wound care nurse consulted  Consult to Podiatry, recommendations are appreciated

## 2025-03-27 NOTE — ASSESSMENT & PLAN NOTE
Lab Results   Component Value Date    HGBA1C 5.7 (H) 02/09/2025     A1c well-controlled  Not on medications at home  Diabetic diet

## 2025-03-27 NOTE — PROGRESS NOTES
Progress Note - Nephrology   Name: Naresh Carpio 64 y.o. male I MRN: [id number]  Unit/Bed#: 4 [place] 416-01 I Date of Admission: 3/24/2025   Date of Service: 3/27/2025 I Hospital Day: 2     Assessment & Plan  ESRD (end stage renal disease) on dialysis ([facility])  #ESRD on HD TTS:  Dialysis unit/days: [facility]  Access: PermCath  Recurrent admissions  Next dialysis today as per schedule, TTS  Renal Diet  Fluid restriction 1-1.5L/d  Adjust medications to GFR<10  Avoid opioids       Hypertension  Volume: Euvolemic  Blood pressure: Normotensive, /50  Low-sodium diet  Fluid restriction 1.8 L  UF on HD as tolerates  Nifedipine 30  Anemia in ESRD (end-stage renal disease)  ([facility])  Current hemoglobin:10.1  Treatment:  Transfuse for hemoglobin less than 7.0 per primary service      Hyponatremia   Serum sodium 132  Secondary to decreased free water excretion in the settings of ESRD  Will be addressed on dialysis  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, on chronic dialysis ([facility])  Continue sevelamer for hyperphosphatemia  Hyperkalemia  Resolved with dialysis    I have reviewed the nephrology recommendations including dialysis today, with primary team, and we are in agreement with renal plan including the information outlined above. I have discussed the above management plan in detail with the primary service.     Subjective   Brief History of Admission - 65 yo man with PMH of ESRD on dialysis TTS admitted after a fall.  Nephrology is consulted for management of ESRD    Patient denies shortness of breath, no chest pain.    Objective :  Temp:  [97.3 °F (36.3 °C)-98.2 °F (36.8 °C)] 98.2 °F (36.8 °C)  HR:  [70-77] 70  BP: (135-166)/(57-65) 147/58  Resp:  [17-20] 18  SpO2:  [94 %-99 %] 97 %  O2 Device: None (Room air)    Current Weight: Weight - Scale: 107 kg (234 lb 12.6 oz)  First Weight: Weight - Scale: 109 kg (240 lb)  I/O         03/25 0701 03/26 0700 03/26 0701 03/27 0700  03/27 0701  03/28 0700    I.V. (mL/kg) 500 (4.7)      Total Intake(mL/kg) 500 (4.7)      Urine (mL/kg/hr) 700 (0.3) 250 (0.1)     Other 3311      Total Output 4011 250     Net -3511 -250                  Physical Exam  General:  no acute distress at this time  Skin:  No acute rash  Eyes:  No scleral icterus and noninjected  ENT:  mucous membranes moist  Neck:  no carotid bruits  Chest:  Clear to auscultation percussion, good respiratory effort, no use of accessory respiratory muscles  CVS:  Regular rate and rhythm without rub   Abdomen:  soft and nontender   Extremities: no significant lower extremity edema  Neuro:  No gross focality  Psych:  Alert , cooperative   Dialysis access: PermCath    Medications:    Current Facility-Administered Medications:     acetaminophen (TYLENOL) tablet 975 mg, 975 mg, Oral, Q8H KEMAL, Lore Revankar, DO, 975 mg at 03/27/25 0610    allopurinol (ZYLOPRIM) tablet 100 mg, 100 mg, Oral, Daily, Lore Revankar, DO, 100 mg at 03/27/25 0842    apixaban (ELIQUIS) tablet 5 mg, 5 mg, Oral, BID, Lore Revankar, DO, 5 mg at 03/27/25 0842    aspirin chewable tablet 81 mg, 81 mg, Oral, Daily, Lore Revankar, DO, 81 mg at 03/27/25 0842    atorvastatin (LIPITOR) tablet 80 mg, 80 mg, Oral, Daily With Dinner, Lore Revankar, DO, 80 mg at 03/26/25 1551    gabapentin (NEURONTIN) capsule 100 mg, 100 mg, Oral, Once per day on Tuesday Thursday Saturday, Lore Revankar, DO, 100 mg at 03/27/25 0842    HYDROmorphone (DILAUDID) injection 0.5 mg, 0.5 mg, Intravenous, Q6H PRN, Nallely Pisano PA-C, 0.5 mg at 03/27/25 0851    influenza vaccine, high-dose (Fluzone High-Dose) IM injection 0.5 mL, 0.5 mL, Intramuscular, Once, Lore Revankar, DO    lidocaine (LIDODERM) 5 % patch 1 patch, 1 patch, Topical, Daily, Lore Silver DO    methocarbamol (ROBAXIN) tablet 500 mg, 500 mg, Oral, Q6H PRN, Lore Silver DO, 500 mg at 03/27/25 0042    NIFEdipine (PROCARDIA XL) 24 hr tablet 30 mg, 30 mg, Oral, After Dinner,  "Lore Revankar, DO, 30 mg at 03/26/25 1833    oxyCODONE (ROXICODONE) split tablet 2.5 mg, 2.5 mg, Oral, Q8H PRN, Lore Revankar, DO, 2.5 mg at 03/27/25 0610    sevelamer (RENAGEL) tablet 800 mg, 800 mg, Oral, TID With Meals, Lore Revankar, DO, 800 mg at 03/27/25 0842      Lab Results: I have reviewed the following results:  Results from last 7 days   Lab Units 03/27/25  0444 03/26/25  0519 03/25/25  0531 03/24/25  1434   WBC Thousand/uL 7.87 7.90 6.98 10.13   HEMOGLOBIN g/dL 10.1* 10.3* 9.5* 10.3*   HEMATOCRIT % 33.0* 34.0* 31.1* 33.7*   PLATELETS Thousands/uL 239 197 178 182   POTASSIUM mmol/L 5.3 4.8 5.4* 6.5*   CHLORIDE mmol/L 94* 93* 97 100   CO2 mmol/L 21 22 21 19*   BUN mg/dL 67* 46* 65* 95*   CREATININE mg/dL 8.23* 6.58* 8.11* 10.44*   CALCIUM mg/dL 8.4 8.2* 7.8* 7.6*   PHOSPHORUS mg/dL  --   --  6.3*  --    ALBUMIN g/dL  --   --  3.5 3.9       Administrative Statements     Portions of the record may have been created with voice recognition software. Occasional wrong word or \"sound a like\" substitutions may have occurred due to the inherent limitations of voice recognition software. Read the chart carefully and recognize, using context, where substitutions have occurred.If you have any questions, please contact the dictating provider.  "

## 2025-03-27 NOTE — PROCEDURES
Left foot wound debridement:    Wound debridement note: After verbal consent was obtained, wound located at the left foot submetatarsal 5 was excisionally debrided with #15 blade of all nonviable, devitalized, necrotic, hyperkeratotic, fibrous tissue w/ excision of skin, subcutaneous tissue to depth of subcutaneous tissue. Post debridement wound measurements 1.0 cm x 1.0 cm x 0.2 cm, with appearance of wound fresh bleeding, viable, granular with viable 100% vs nonviable 0%, <20sq cm debrided.  Patient tolerated the procedure well.  All ulceration sites to the left foot were dressed with Adaptic, dry sterile dressing.

## 2025-03-27 NOTE — ASSESSMENT & PLAN NOTE
Potassium 6.5 on admission. Urgent dialysis session 3/25/25  Potassium normalized   Monitor with daily BMP

## 2025-03-27 NOTE — ASSESSMENT & PLAN NOTE
Current hemoglobin:10.1  Treatment:  Transfuse for hemoglobin less than 7.0 per primary service

## 2025-03-27 NOTE — ASSESSMENT & PLAN NOTE
Results from last 7 days   Lab Units 03/27/25  0444 03/26/25  0519 03/25/25  0531 03/24/25  1434   HEMOGLOBIN g/dL 10.1* 10.3* 9.5* 10.3*   HEMATOCRIT % 33.0* 34.0* 31.1* 33.7*   MCV fL 95 96 95 95   Hemoglobin is stable  Daily CBC

## 2025-03-27 NOTE — CONSULTS
Podiatry - Consultation    Patient Information:   Naresh Carpio 64 y.o. male MRN: [id number]  Unit/Bed#: [address] [place] [address] Encounter: [id number]  PCP: Jeffrey Jackson  Date of Admission:  3/24/2025  Date of Consultation: 03/27/25  Requesting Physician: Kadeem Ortiz MD      ASSESSMENT:    Naresh Carpio is a 64 y.o. male with:    Right medial foot laceration  Left medial 1st MTPJ diabetic ulceration to the level of subcutaneous tissue, Delgadillo 2  Left submetatarsal 5 diabetic ulceration to level of subcutaneous tissue, Delgadillo 2  Left medial hallux diabetic ulceration to level of subcutaneous tissue, Delgadillo 2  Type 2 diabetes with peripheral neuropathy  End-stage renal disease on hemodialysis    PLAN:    Will plan for local wound care for now pending results of imaging  Follow-up MRI of left foot to rule out osteomyelitis  Follow-up new lower extremity arterial duplex of the left foot given history of PAD and new ulcerations  Debridement performed to the left plantar foot as outlined in procedure note  Left foot x-ray from 3/26/2025 reviewed: No definitive signs of osteomyelitis noted, no abnormalities within the soft tissues, extensive degenerative changes throughout the entire foot noted  Right foot x-ray from 3/26/2025 reviewed: No definitive signs of osteomyelitis noted, no abnormalities within the soft tissues noted, extensive degenerative changes throughout the entire foot noted  Elevation and offloading on green foam wedges or pillows when non-ambulatory.  Rest of care per primary team.    Weightbearing status: Weightbearing as tolerated in surgical shoe to the left foot    SUBJECTIVE:    History of Present Illness:    Naresh Carpio is a 64 y.o. male who is originally admitted 3/24/2025 due to closed fracture of the second lumbar vertebra. Patient has a past medical history of type 2 diabetes, end-stage renal disease on hemodialysis.    We are consulted for evaluation of the patient's bilateral feet.  The patient  states that a few days ago he was making tea and felt his legs collapsed on him.  He states that he fell over and breaking his glass cup on the floor.  He states that he had to crawl over the class in order to get to his phone causing multiple lacerations to his feet.  He fortunately denies any systemic signs of infection at this time    Review of Systems:    Constitutional: Negative.    HENT: Negative.    Eyes: Negative.    Respiratory: Negative.    Cardiovascular: Negative.    Gastrointestinal: Negative.    Musculoskeletal: Left foot pain  Skin: Multiple foot ulcerations  Neurological: Peripheral neuropathy  Psych: Negative.     Past Medical and Surgical History:     Past Medical History:   Diagnosis Date    Acute cystitis 10/18/2024    Acute on chronic anemia 01/23/2022    Atrial fibrillation (HCC)     Bradycardia 09/05/2023    Diabetes mellitus (HCC)     ESRD (end stage renal disease) on dialysis (HCC)     Hematuria 10/10/2024    Hematuria 10/10/2024    Hyperkalemia 04/06/2024    Hyperkalemia 04/06/2024    Hyperlipidemia     Hypertension     Left toe amputee (HCC)     TIA (transient ischemic attack)     Toxic metabolic encephalopathy 10/08/2024       Past Surgical History:   Procedure Laterality Date    AMPUTATION Left     left foot resection 10 years ago dr tran    HEMODIALYSIS ADULT  1/18/2025    HEMODIALYSIS ADULT  2/27/2025    IR LOWER EXTREMITY ANGIOGRAM  08/29/2023    IR TEMPORARY DIALYSIS CATHETER PLACEMENT  08/25/2023    IR TUNNELED CENTRAL LINE REMOVAL  08/23/2023    IR TUNNELED DIALYSIS CATHETER PLACEMENT  01/27/2022    IR TUNNELED DIALYSIS CATHETER PLACEMENT  08/30/2023    FL AMPUTATION METATARSAL W/TOE SINGLE Right 8/31/2023    Procedure: RIGHT PARTIAL 1ST RAY RESECTION WITH  WOUND VAC APPLICATION;  Surgeon: Won Parmar DPM;  Location: [facility];  Service: Podiatry       Meds/Allergies:      Medications Prior to Admission:     allopurinol (ZYLOPRIM) 100 mg tablet    apixaban (Eliquis) 5 mg     "aspirin 81 mg chewable tablet    atorvastatin (LIPITOR) 80 mg tablet    dextromethorphan-guaiFENesin (ROBITUSSIN DM)  mg/5 mL oral syrup    gabapentin (NEURONTIN) 100 mg capsule    NIFEdipine (PROCARDIA XL) 30 mg 24 hr tablet    sevelamer (RENAGEL) 800 mg tablet    No Known Allergies    Social History:     Marital Status:     Substance Use History:   Social History     Substance and Sexual Activity   Alcohol Use Not Currently    Alcohol/week: 0.0 standard drinks of alcohol    Comment: 0     Social History     Tobacco Use   Smoking Status Never    Passive exposure: Never   Smokeless Tobacco Never     Social History     Substance and Sexual Activity   Drug Use Never       Family History:    No family history on file.      OBJECTIVE:    Vitals:   Blood Pressure: 150/65 (03/27/25 1800)  Pulse: 77 (03/27/25 1800)  Temperature: (!) 97.2 °F (36.2 °C) (03/27/25 1500)  Temp Source: Oral (03/27/25 1215)  Respirations: 18 (03/27/25 1500)  Height: 6' 1\" (185.4 cm) (03/24/25 1656)  Weight - Scale: 107 kg (234 lb 12.6 oz) (03/24/25 2230)  SpO2: 99 % (03/27/25 1800)    Physical Exam:    General Appearance: Alert, cooperative, no distress.  HEENT: Head normocephalic, atraumatic, without obvious abnormality.  Heart: Normal rate and rhythm.  Lungs: Non-labored breathing. No respiratory distress.  Abdomen: Without distension.  Psychiatric: AAOx3  Lower Extremity:    Vascular:   DP: Right: non-palpable Left: non-palpable  PT: Right: non-palpable Left: non-palpable  CRT < 3 seconds at the digits. +2/4 edema noted at bilateral lower extremities.   Pedal hair is absent.   Skin temperature is WNL bilaterally.    Musculoskeletal:  MMT is 5/5 in all muscle compartments bilaterally.   Pain on palpation of the left submetatarsal 5 ulceration.   No gross deformities noted.     Dermatological:  Lower extremity wound(s) as noted below:    Wound #: 1  Location: Right medial foot  Length 2.0 cm: Width 0.3 cm: Depth 0.1 cm:   Deepest " Tissue Noted in Base: Dermis  Probe to Bone: No  Peripheral Skin Description:  Peeling  Granulation: 100% Fibrotic Tissue: 0% Necrotic Tissue: 0%   Drainage Amount: minimal, bloody  Signs of Infection: No      Wound #: 2  Location: Left foot, submetatarsal 5  Length 1.0 cm: Width 1.0 cm: Depth 0.2 cm:   Deepest Tissue Noted in Base: Subcutaneous tissue  Probe to Bone: No  Peripheral Skin Description: Peeling  Granulation: 100% Fibrotic Tissue: 0% Necrotic Tissue: 0%   Drainage Amount: minimal, bloody  Signs of Infection: No      Wound #: 3  Location: Left foot, medial hallux  Length 0.4 cm: Width 0.4 cm: Depth 0.2 cm:   Deepest Tissue Noted in Base: Subcutaneous tissue  Probe to Bone: No  Peripheral Skin Description: Peeling  Granulation: 100% Fibrotic Tissue: 0% Necrotic Tissue: 0%   Drainage Amount: minimal, bloody  Signs of Infection: No      Wound #: 4  Location: Left foot, medial first MTPJ  Length 1.0 cm: Width 1.0 cm: Depth 0.1 cm:   Deepest Tissue Noted in Base: Subcutaneous tissue  Probe to Bone: No  Peripheral Skin Description: Peeling  Granulation: 0% Fibrotic Tissue: 100% Necrotic Tissue: 0%   Drainage Amount: minimal, bloody  Signs of Infection: No      Neurological:  Gross sensation is diminished.   Light touch is diminished.   Protective sensation is diminished.      Additional data:     Lab Results: I have personally reviewed pertinent labs including:    Results from last 7 days   Lab Units 03/27/25 0444 03/25/25 0531 03/24/25  1434   WBC Thousand/uL 7.87   < > 10.13   HEMOGLOBIN g/dL 10.1*   < > 10.3*   HEMATOCRIT % 33.0*   < > 33.7*   PLATELETS Thousands/uL 239   < > 182   SEGS PCT %  --   --  79*   LYMPHO PCT %  --   --  6*   MONO PCT %  --   --  11   EOS PCT %  --   --  3    < > = values in this interval not displayed.     Results from last 7 days   Lab Units 03/27/25 0444 03/25/25  0531 03/24/25  1434   POTASSIUM mmol/L 5.3   < > 6.5*   CHLORIDE mmol/L 94*   < > 100   CO2 mmol/L 21   < > 19*  "  BUN mg/dL 67*   < > 95*   CREATININE mg/dL 8.23*   < > 10.44*   CALCIUM mg/dL 8.4   < > 7.6*   ALK PHOS U/L  --   --  94   ALT U/L  --   --  17   AST U/L  --   --  13    < > = values in this interval not displayed.           Cultures: I have personally reviewed pertinent cultures including:              Imaging: I have personally reviewed pertinent reports in PACS.  EKG, Pathology, and Other Studies: I have personally reviewed pertinent reports.    Time Spent for Care: 30 minutes.  More than 50% of total time spent on counseling and coordination of care as described above.      ** Please Note: Portions of the record may have been created with voice recognition software. Occasional wrong word or \"sound a like\" substitutions may have occurred due to the inherent limitations of voice recognition software. Read the chart carefully and recognize, using context, where substitutions have occurred. **   "

## 2025-03-28 ENCOUNTER — APPOINTMENT (INPATIENT)
Dept: RADIOLOGY | Facility: HOSPITAL | Age: 65
DRG: 463 | End: 2025-03-28
Payer: MEDICARE

## 2025-03-28 LAB
ANION GAP SERPL CALCULATED.3IONS-SCNC: 16 MMOL/L (ref 4–13)
BUN SERPL-MCNC: 49 MG/DL (ref 5–25)
CALCIUM SERPL-MCNC: 8.5 MG/DL (ref 8.4–10.2)
CHLORIDE SERPL-SCNC: 93 MMOL/L (ref 96–108)
CO2 SERPL-SCNC: 21 MMOL/L (ref 21–32)
CREAT SERPL-MCNC: 6.26 MG/DL (ref 0.6–1.3)
ERYTHROCYTE [DISTWIDTH] IN BLOOD BY AUTOMATED COUNT: 16.9 % (ref 11.6–15.1)
GFR SERPL CREATININE-BSD FRML MDRD: 8 ML/MIN/1.73SQ M
GLUCOSE SERPL-MCNC: 111 MG/DL (ref 65–140)
HCT VFR BLD AUTO: 30.9 % (ref 36.5–49.3)
HGB BLD-MCNC: 9.6 G/DL (ref 12–17)
MCH RBC QN AUTO: 29.1 PG (ref 26.8–34.3)
MCHC RBC AUTO-ENTMCNC: 31.1 G/DL (ref 31.4–37.4)
MCV RBC AUTO: 94 FL (ref 82–98)
PLATELET # BLD AUTO: 239 THOUSANDS/UL (ref 149–390)
PMV BLD AUTO: 9.6 FL (ref 8.9–12.7)
POTASSIUM SERPL-SCNC: 5 MMOL/L (ref 3.5–5.3)
RBC # BLD AUTO: 3.3 MILLION/UL (ref 3.88–5.62)
SODIUM SERPL-SCNC: 130 MMOL/L (ref 135–147)
WBC # BLD AUTO: 8.68 THOUSAND/UL (ref 4.31–10.16)

## 2025-03-28 PROCEDURE — 85027 COMPLETE CBC AUTOMATED: CPT | Performed by: INTERNAL MEDICINE

## 2025-03-28 PROCEDURE — 99232 SBSQ HOSP IP/OBS MODERATE 35: CPT | Performed by: INTERNAL MEDICINE

## 2025-03-28 PROCEDURE — 93923 UPR/LXTR ART STDY 3+ LVLS: CPT

## 2025-03-28 PROCEDURE — 80048 BASIC METABOLIC PNL TOTAL CA: CPT | Performed by: INTERNAL MEDICINE

## 2025-03-28 PROCEDURE — 93925 LOWER EXTREMITY STUDY: CPT

## 2025-03-28 PROCEDURE — 97530 THERAPEUTIC ACTIVITIES: CPT

## 2025-03-28 RX ORDER — HYDROMORPHONE HCL/PF 1 MG/ML
0.5 SYRINGE (ML) INJECTION ONCE
Status: COMPLETED | OUTPATIENT
Start: 2025-03-28 | End: 2025-03-28

## 2025-03-28 RX ADMIN — ASPIRIN 81 MG CHEWABLE TABLET 81 MG: 81 TABLET CHEWABLE at 08:23

## 2025-03-28 RX ADMIN — ALLOPURINOL 100 MG: 100 TABLET ORAL at 08:23

## 2025-03-28 RX ADMIN — METHOCARBAMOL 500 MG: 500 TABLET ORAL at 05:03

## 2025-03-28 RX ADMIN — APIXABAN 5 MG: 5 TABLET, FILM COATED ORAL at 08:23

## 2025-03-28 RX ADMIN — SEVELAMER HYDROCHLORIDE 800 MG: 800 TABLET, FILM COATED ORAL at 16:24

## 2025-03-28 RX ADMIN — Medication 2.5 MG: at 05:51

## 2025-03-28 RX ADMIN — ACETAMINOPHEN 975 MG: 325 TABLET ORAL at 14:03

## 2025-03-28 RX ADMIN — APIXABAN 5 MG: 5 TABLET, FILM COATED ORAL at 17:05

## 2025-03-28 RX ADMIN — NIFEDIPINE 30 MG: 30 TABLET, EXTENDED RELEASE ORAL at 17:05

## 2025-03-28 RX ADMIN — ACETAMINOPHEN 975 MG: 325 TABLET ORAL at 21:17

## 2025-03-28 RX ADMIN — Medication 2.5 MG: at 16:24

## 2025-03-28 RX ADMIN — METHOCARBAMOL 500 MG: 500 TABLET ORAL at 11:11

## 2025-03-28 RX ADMIN — ACETAMINOPHEN 975 MG: 325 TABLET ORAL at 05:03

## 2025-03-28 RX ADMIN — HYDROMORPHONE HYDROCHLORIDE 0.5 MG: 1 INJECTION, SOLUTION INTRAMUSCULAR; INTRAVENOUS; SUBCUTANEOUS at 08:26

## 2025-03-28 RX ADMIN — HYDROMORPHONE HYDROCHLORIDE 0.5 MG: 1 INJECTION, SOLUTION INTRAMUSCULAR; INTRAVENOUS; SUBCUTANEOUS at 11:42

## 2025-03-28 RX ADMIN — SEVELAMER HYDROCHLORIDE 800 MG: 800 TABLET, FILM COATED ORAL at 11:11

## 2025-03-28 RX ADMIN — METHOCARBAMOL 500 MG: 500 TABLET ORAL at 21:17

## 2025-03-28 RX ADMIN — HYDROMORPHONE HYDROCHLORIDE 0.5 MG: 1 INJECTION, SOLUTION INTRAMUSCULAR; INTRAVENOUS; SUBCUTANEOUS at 17:08

## 2025-03-28 RX ADMIN — ATORVASTATIN CALCIUM 80 MG: 80 TABLET, FILM COATED ORAL at 16:24

## 2025-03-28 RX ADMIN — SEVELAMER HYDROCHLORIDE 800 MG: 800 TABLET, FILM COATED ORAL at 08:23

## 2025-03-28 NOTE — ASSESSMENT & PLAN NOTE
Results from last 7 days   Lab Units 03/28/25  0509 03/27/25  0444 03/26/25  0519 03/25/25  0531 03/24/25  1434   HEMOGLOBIN g/dL 9.6* 10.1* 10.3* 9.5* 10.3*   HEMATOCRIT % 30.9* 33.0* 34.0* 31.1* 33.7*   MCV fL 94 95 96 95 95   Hemoglobin is stable  Daily CBC

## 2025-03-28 NOTE — ASSESSMENT & PLAN NOTE
Bilateral foot wounds, sustained from broken glass  Follow up XR BL foot  Wound care nurse consulted  Consult to Podiatry, s/p wound debridement. . MRI pending.

## 2025-03-28 NOTE — ASSESSMENT & PLAN NOTE
TTS at [facility] IN [place]  Access: Right IJ permacath  HD today 03/29  Patient seen and examined on HD.  Aiming for 4 L UF.  Blood flow rate 350, dialysate flow rate 500.  Potassium bath 2K

## 2025-03-28 NOTE — PLAN OF CARE
Problem: PAIN - ADULT  Goal: Verbalizes/displays adequate comfort level or baseline comfort level  Description: Interventions:- Encourage patient to monitor pain and request assistance- Assess pain using appropriate pain scale- Administer analgesics based on type and severity of pain and evaluate response- Implement non-pharmacological measures as appropriate and evaluate response- Consider cultural and social influences on pain and pain management- Notify physician/advanced practitioner if interventions unsuccessful or patient reports new pain  Outcome: Progressing     Problem: SAFETY ADULT  Goal: Patient will remain free of falls  Description: INTERVENTIONS:- Educate patient/family on patient safety including physical limitations- Instruct patient to call for assistance with activity - Consult OT/PT to assist with strengthening/mobility - Keep Call bell within reach- Keep bed low and locked with side rails adjusted as appropriate- Keep care items and personal belongings within reach- Initiate and maintain comfort rounds- Make Fall Risk Sign visible to staff- Offer Toileting every 2 Hours, in advance of need- Initiate/Maintain vbed and chair alarm- - Apply yellow socks and bracelet for high fall risk patients- Consider moving patient to room near nurses station  Outcome: Progressing  Goal: Maintains/Returns to pre admission functional level  Description: INTERVENTIONS:- Perform AM-PAC 6 Click Basic Mobility/ Daily Activity assessment daily.- Set and communicate daily mobility goal to care team and patient/family/caregiver. - Collaborate with rehabilitation services on mobility goals if consulted- Perform Range of Motion 4 times a day.- Reposition patient every 2 hours.- Dangle patient 4 times a day- Stand patient 4 times a day- Ambulate patient 4 times a day- Out of bed to chair 4 times a day - Out of bed for meals 4 times a day- Out of bed for toileting- Record patient progress and toleration of activity level    Outcome: Progressing     Problem: DISCHARGE PLANNING  Goal: Discharge to home or other facility with appropriate resources  Description: INTERVENTIONS:- Identify barriers to discharge w/patient and caregiver- Arrange for needed discharge resources and transportation as appropriate- Identify discharge learning needs (meds, wound care, etc.)- Arrange for interpretive services to assist at discharge as needed- Refer to Case Management Department for coordinating discharge planning if the patient needs post-hospital services based on physician/advanced practitioner order or complex needs related to functional status, cognitive ability, or social support system  Outcome: Progressing     Problem: Knowledge Deficit  Goal: Patient/family/caregiver demonstrates understanding of disease process, treatment plan, medications, and discharge instructions  Description: Complete learning assessment and assess knowledge base.Interventions:- Provide teaching at level of understanding- Provide teaching via preferred learning methods  Outcome: Progressing     Problem: NEUROSENSORY - ADULT  Goal: Achieves stable or improved neurological status  Description: INTERVENTIONS- Monitor and report changes in neurological status- Monitor vital signs such as temperature, blood pressure, glucose, and any other labs ordered - Initiate measures to prevent increased intracranial pressure- Monitor for seizure activity and implement precautions if appropriate    Outcome: Progressing     Problem: METABOLIC, FLUID AND ELECTROLYTES - ADULT  Goal: Electrolytes maintained within normal limits  Description: INTERVENTIONS:- Monitor labs and assess patient for signs and symptoms of electrolyte imbalances- Administer electrolyte replacement as ordered- Monitor response to electrolyte replacements, including repeat lab results as appropriate- Instruct patient on fluid and nutrition as appropriate  INTERVENTIONS:- Monitor labs and assess patient for signs and  symptoms of electrolyte imbalances- Administer electrolyte replacement as ordered- Monitor response to electrolyte replacements, including repeat lab results as appropriate- Instruct patient on fluid and nutrition as appropriate  Outcome: Progressing  Goal: Fluid balance maintained  Description: INTERVENTIONS:- Monitor labs - Monitor I/O and WT- Instruct patient on fluid and nutrition as appropriate- Assess for signs & symptoms of volume excess or deficit  Outcome: Progressing     Problem: MUSCULOSKELETAL - ADULT  Goal: Maintain or return mobility to safest level of function  Description: INTERVENTIONS:- Assess patient's ability to carry out ADLs; assess patient's baseline for ADL function and identify physical deficits which impact ability to perform ADLs (bathing, care of mouth/teeth, toileting, grooming, dressing, etc.)- Assess/evaluate cause of self-care deficits - Assess range of motion- Assess patient's mobility- Assess patient's need for assistive devices and provide as appropriate- Encourage maximum independence but intervene and supervise when necessary- Involve family in performance of ADLs- Assess for home care needs following discharge - Consider OT consult to assist with ADL evaluation and planning for discharge- Provide patient education as appropriate  Outcome: Progressing     Problem: Prexisting or High Potential for Compromised Skin Integrity  Goal: Skin integrity is maintained or improved  Description: INTERVENTIONS:- Identify patients at risk for skin breakdown- Assess and monitor skin integrity- Assess and monitor nutrition and hydration status- Monitor labs - Assess for incontinence - Turn and reposition patient- Assist with mobility/ambulation- Relieve pressure over bony prominences- Avoid friction and shearing- Provide appropriate hygiene as needed including keeping skin clean and dry- Evaluate need for skin moisturizer/barrier cream- Collaborate with interdisciplinary team - Patient/family  teaching- Consider wound care consult   Outcome: Progressing     Problem: Nutrition/Hydration-ADULT  Goal: Nutrient/Hydration intake appropriate for improving, restoring or maintaining nutritional needs  Description: Monitor and assess patient's nutrition/hydration status for malnutrition. Collaborate with interdisciplinary team and initiate plan and interventions as ordered.  Monitor patient's weight and dietary intake as ordered or per policy. Utilize nutrition screening tool and intervene as necessary. Determine patient's food preferences and provide high-protein, high-caloric foods as appropriate. INTERVENTIONS:- Monitor oral intake, urinary output, labs, and treatment plans- Assess nutrition and hydration status and recommend course of action- Evaluate amount of meals eaten- Assist patient with eating if necessary - Allow adequate time for meals- Recommend/ encourage appropriate diets, oral nutritional supplements, and vitamin/mineral supplements- Order, calculate, and assess calorie counts as needed- Recommend, monitor, and adjust tube feedings and TPN/PPN based on assessed needs- Assess need for intravenous fluids- Provide specific nutrition/hydration education as appropriate- Include patient/family/caregiver in decisions related to nutrition  Outcome: Progressing

## 2025-03-28 NOTE — QUICK NOTE
Chart reviewed.  Labs reviewed.  Vitals reviewed.  Patient had hemodialysis yesterday with 2.2 L UF.  No indication for dialysis today.  Next hemodialysis session will be tomorrow.  Please call with questions in interim.

## 2025-03-28 NOTE — NURSING NOTE
"Patient unable to tolerate MRI last night. Patient given options for pain control prior to going to MRI today and patient states \"I can't do it even with the medication, there is no way I can lay on that table.\" MRI and provider made aware.   "

## 2025-03-28 NOTE — ASSESSMENT & PLAN NOTE
Lab Results   Component Value Date    EGFR 8 03/28/2025    EGFR 6 03/27/2025    EGFR 8 03/26/2025    CREATININE 6.26 (H) 03/28/2025    CREATININE 8.23 (H) 03/27/2025    CREATININE 6.58 (H) 03/26/2025     TTS schedule as outpatient  Noncompliant with dialysis sessions.  Missed dialysis on Tuesday and Thursday  S/p urgent HD on 3/25/25 for hyperkalemia  Next dialysis today 3/27/25  Nephrology is following

## 2025-03-28 NOTE — PROGRESS NOTES
NEPHROLOGY HOSPITAL PROGRESS NOTE   Naresh Carpio 64 y.o. male MRN: [id number]  Unit/Bed#: [address] [place], [place] [id number] Encounter: [id number]  Reason for Consult: ESRD on HD  Assessment & Plan  ESRD (end stage renal disease) on dialysis ([facility])  TTS at [facility] IN [place]  Access: Right IJ permacath  HD today 03/29  Patient seen and examined on HD.  Aiming for 4 L UF.  Blood flow rate 350, dialysate flow rate 500.  Potassium bath 2K  Hypertension  Continue home medications  Anemia in ESRD (end-stage renal disease)  ([facility])  Hemoglobin today 9.1  Not on SURINDER as outpatient  Hyponatremia  Sodium level today 128  Use 138 mEq bath on dialysis today  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, on chronic dialysis ([facility])  Continue sevelamer for hyperphosphatemia  Hyperkalemia  Potassium level today 5.4  To correct with HD, use 2K bath on HD  Abnormal involuntary movement (Resolved: 3/26/2025)  This may be related to gabapentin use  Currently resolved with back-to-back HD  Continue current dose of gabapentin (100 mg 3 times per week after dialysis)    SUBJECTIVE / 24H INTERVAL HISTORY:  Denies dyspnea.  Denies leg swelling.  Myoclonus has resolved with back-to-back dialysis.  Complains of back pain and shoulder pain    OBJECTIVE:  Current Weight: Weight - Scale: 107 kg (234 lb 12.6 oz)  Vitals:    03/29/25 0815 03/29/25 0830 03/29/25 0900 03/29/25 0930   BP: 127/54 140/56 129/55 126/54   BP Location: Right arm      Pulse: 65 69 67 74   Resp: 16 14 16 14   Temp: 97.8 °F (36.6 °C)      TempSrc: Oral      SpO2: 98% 95% 97% 98%   Weight:       Height:           Intake/Output Summary (Last 24 hours) at 3/29/2025 1000  Last data filed at 3/29/2025 0815  Gross per 24 hour   Intake 200 ml   Output 200 ml   Net 0 ml     Review of Systems   Constitutional:  Negative for chills and fever.   HENT:  Negative for ear pain and sore throat.    Eyes:  Negative for pain and visual disturbance.   Respiratory:  Negative  for cough and shortness of breath.    Cardiovascular:  Negative for chest pain and palpitations.   Gastrointestinal:  Negative for abdominal pain and vomiting.   Genitourinary:  Negative for dysuria and hematuria.   Musculoskeletal:  Positive for back pain. Negative for arthralgias.   Skin:  Negative for color change and rash.   Neurological:  Negative for seizures and syncope.   All other systems reviewed and are negative.    Physical Exam  Vitals and nursing note reviewed.   Constitutional:       General: He is not in acute distress.     Appearance: He is well-developed.   HENT:      Head: Normocephalic and atraumatic.   Eyes:      Conjunctiva/sclera: Conjunctivae normal.   Cardiovascular:      Rate and Rhythm: Normal rate and regular rhythm.      Pulses: Normal pulses.      Heart sounds: Normal heart sounds. No murmur heard.     Comments: Right chest wall permacath currently in use for hemodialysis  Pulmonary:      Effort: Pulmonary effort is normal. No respiratory distress.      Breath sounds: Normal breath sounds.   Abdominal:      Palpations: Abdomen is soft.      Tenderness: There is no abdominal tenderness.   Musculoskeletal:         General: No swelling.      Cervical back: Neck supple.      Right lower leg: Edema present.      Left lower leg: Edema present.   Skin:     General: Skin is warm and dry.      Capillary Refill: Capillary refill takes less than 2 seconds.   Neurological:      Mental Status: He is alert.   Psychiatric:         Mood and Affect: Mood normal.         Medications:    Current Facility-Administered Medications:     acetaminophen (TYLENOL) tablet 975 mg, 975 mg, Oral, Q8H KEMAL, Lore Revankar, DO, 975 mg at 03/28/25 2117    allopurinol (ZYLOPRIM) tablet 100 mg, 100 mg, Oral, Daily, Lore Revankar, DO, 100 mg at 03/29/25 0831    apixaban (ELIQUIS) tablet 5 mg, 5 mg, Oral, BID, Lore Revankar, DO, 5 mg at 03/29/25 0830    aspirin chewable tablet 81 mg, 81 mg, Oral, Daily, Lore  "Revankar, DO, 81 mg at 03/29/25 0830    atorvastatin (LIPITOR) tablet 80 mg, 80 mg, Oral, Daily With Dinner, Lore Revankar, DO, 80 mg at 03/28/25 1624    gabapentin (NEURONTIN) capsule 100 mg, 100 mg, Oral, Once per day on Tuesday Thursday Saturday, Lore Revankar, DO, 100 mg at 03/29/25 0830    HYDROmorphone (DILAUDID) injection 0.5 mg, 0.5 mg, Intravenous, Q6H PRN, Nallely Pisano PA-C, 0.5 mg at 03/29/25 0805    influenza vaccine, high-dose (Fluzone High-Dose) IM injection 0.5 mL, 0.5 mL, Intramuscular, Once, Lore Amadorar, DO    lidocaine (LIDODERM) 5 % patch 1 patch, 1 patch, Topical, Daily, Lore Revdelar, DO, 1 patch at 03/27/25 1346    methocarbamol (ROBAXIN) tablet 500 mg, 500 mg, Oral, Q6H PRN, Lore Revankar, DO, 500 mg at 03/29/25 0831    NIFEdipine (PROCARDIA XL) 24 hr tablet 30 mg, 30 mg, Oral, After Dinner, Lore Nascimentoankar, DO, 30 mg at 03/28/25 1705    oxyCODONE (ROXICODONE) split tablet 2.5 mg, 2.5 mg, Oral, Q8H PRN, Lore Revankar, DO, 2.5 mg at 03/29/25 0033    sevelamer (RENAGEL) tablet 800 mg, 800 mg, Oral, TID With Meals, Lore Revdelar, DO, 800 mg at 03/29/25 0831    Laboratory Results:  Results from last 7 days   Lab Units 03/29/25  0628 03/28/25  0509 03/27/25  0444 03/26/25  0519 03/25/25  0531 03/24/25  1434   WBC Thousand/uL 7.97 8.68 7.87 7.90 6.98 10.13   HEMOGLOBIN g/dL 9.1* 9.6* 10.1* 10.3* 9.5* 10.3*   HEMATOCRIT % 29.3* 30.9* 33.0* 34.0* 31.1* 33.7*   PLATELETS Thousands/uL 230 239 239 197 178 182   POTASSIUM mmol/L 5.4* 5.0 5.3 4.8 5.4* 6.5*   CHLORIDE mmol/L 93* 93* 94* 93* 97 100   CO2 mmol/L 18* 21 21 22 21 19*   BUN mg/dL 67* 49* 67* 46* 65* 95*   CREATININE mg/dL 7.69* 6.26* 8.23* 6.58* 8.11* 10.44*   CALCIUM mg/dL 8.2* 8.5 8.4 8.2* 7.8* 7.6*   PHOSPHORUS mg/dL  --   --   --   --  6.3*  --        Portions of the record may have been created with voice recognition software. Occasional wrong word or \"sound a like\" substitutions may have occurred due to the inherent " limitations of voice recognition software. Read the chart carefully and recognize, using context, where substitutions have occurred.If you have any questions, please contact the dictating provider.

## 2025-03-28 NOTE — PHYSICAL THERAPY NOTE
PT TREATMENT       03/28/25 1445   PT Last Visit   PT Visit Date 03/28/25   Note Type   Note Type Treatment   Pain Assessment   Pain Assessment Tool 0-10   Pain Score 7   Pain Location/Orientation Orientation: Lower;Location: Back   Pain Onset/Description Onset: Ongoing;Descriptor: Sore   Effect of Pain on Daily Activities limits rest/mobility   Patient's Stated Pain Goal No pain   Hospital Pain Intervention(s) Repositioned;Ambulation/increased activity   Restrictions/Precautions   Weight Bearing Precautions Per Order Yes   LLE Weight Bearing Per Order WBAT  (with surgical shoe (pt adamantly refusing))   Braces or Orthoses LSO  (to be worn when OOB + when HOB > 45 degrees)   Other Precautions Bed Alarm;Chair Alarm;Fall Risk;Pain   General   Chart Reviewed Yes   Family/Caregiver Present No   Cognition   Overall Cognitive Status WFL   Arousal/Participation Cooperative   Orientation Level Oriented X4   Following Commands Follows multistep commands with increased time or repetition   Subjective   Subjective pt agreeable to PT session this afternoon   Bed Mobility   Supine to Sit Unable to assess   Sit to Supine Unable to assess   Additional Comments Received sitting in bedside chair   Transfers   Sit to Stand 5  Supervision   Additional items Assist x 1;Verbal cues;Increased time required;Armrests   Stand to Sit 5  Supervision   Additional items Assist x 1;Verbal cues;Increased time required;Armrests   Ambulation/Elevation   Gait pattern Foward flexed;Short stride;Step through pattern  (decreased gait speed)   Gait Assistance 5  Supervision   Additional items Verbal cues   Assistive Device Rolling walker   Distance 75 feet with change of direction   Stair Management Assistance Not tested   Balance   Static Sitting Fair +   Dynamic Sitting Fair   Static Standing Fair   Dynamic Standing Fair -   Ambulatory Fair -  (RW)   Activity Tolerance   Activity Tolerance Patient tolerated treatment well;Patient limited by fatigue    Nurse Made Aware yes   Assessment   Prognosis Good   Problem List Decreased strength;Decreased range of motion;Decreased endurance;Impaired balance;Decreased mobility;Pain;Decreased coordination;Orthopedic restrictions   Assessment Pt seen for PT session this afternoon. pt continues to refuse surgical shoe despite education, pt agreeable to WBAT without the shoe. Able to ambulate approx 75 feet with supervision using RW + gait deviations as mentioned above. Educated on L foot offloading using RW + appropriate use of LSO - pt verbalized understanding. pt fatigued + repositioned in bedside chair with all needs within reach. Continue to recommend level 2 moderate resource intensity.  The patient's AM-[facility] Basic Mobility Inpatient Short Form Raw Score is 16. A Raw score of less than or equal to 16 suggests the patient may benefit from discharge to post-acute rehabilitation services. Please also refer to the recommendation of the Physical Therapist for safe discharge planning.     Goals   Patient Goals to get better   Plan   Treatment/Interventions Functional transfer training;LE strengthening/ROM;ADL retraining;Endurance training;Therapeutic exercise;Gait training;Bed mobility;Spoke to nursing   PT Frequency 3-5x/wk   Discharge Recommendation   Rehab Resource Intensity Level, PT II (Moderate Resource Intensity)   AM-PAC Basic Mobility Inpatient   Turning in Flat Bed Without Bedrails 3   Lying on Back to Sitting on Edge of Flat Bed Without Bedrails 3   Moving Bed to Chair 3   Standing Up From Chair Using Arms 3   Walk in Room 3   Climb 3-5 Stairs With Railing 1   Basic Mobility Inpatient Raw Score 16   Basic Mobility Standardized Score 38.32   [facility] Highest Level Of Mobility   -HLM Goal 5: Stand one or more mins   -HLM Achieved 7: Walk 25 feet or more   Education   Education Provided Mobility training;Other  (LSO, WBAT LLE)   Patient Demonstrates verbal understanding;Reinforcement needed   End of Consult    Patient Position at End of Consult Bedside chair;All needs within reach  (as found prior to PT session)   Licensure   [place] License Number  Lizeth Gill [id number]

## 2025-03-28 NOTE — PHYSICAL THERAPY NOTE
PT Cancellation Note       03/28/25 0843   Note Type   Note Type Cancelled Session   Cancel Reasons Refusal  (Attempted to see pt this AM for PT treatment. Discussed podiatry recommendation for surgical shoe. Pt reports he wants to hold off on surgical shoe and wait for MRI despite education on use of shoe to offload L foot. Pt declines participation at this time. Will follow-up as schedule allows.)   Licensure   [place] License Number  Lizeth Roth [id number]

## 2025-03-28 NOTE — PROGRESS NOTES
Progress Note - Hospitalist   Name: Naresh Carpio 64 y.o. male I MRN: [id number]  Unit/Bed#: [address] [place] [address] I Date of Admission: 3/24/2025   Date of Service: 3/28/2025 I Hospital Day: 3    Assessment & Plan  Closed fracture of second lumbar vertebra (HCC)  Patient presented to the ER after he fell in the kitchen on his side.  He could not get up and crawled over broken pieces of glass to get to his phone  CT scan shows acute L2 vertebral fracture  X-ray showed chronic right pubic ramus fracture  Pain control with Lidoderm patch, scheduled Tylenol, Robaxin and low-dose oxycodone as needed  LSO brace  PT/OT- recommending STR  Multiple open wounds of foot  Bilateral foot wounds, sustained from broken glass  Follow up XR BL foot  Wound care nurse consulted  Consult to Podiatry, s/p wound debridement. . MRI pending.   ESRD (end stage renal disease) on dialysis ([facility])  Lab Results   Component Value Date    EGFR 8 03/28/2025    EGFR 6 03/27/2025    EGFR 8 03/26/2025    CREATININE 6.26 (H) 03/28/2025    CREATININE 8.23 (H) 03/27/2025    CREATININE 6.58 (H) 03/26/2025     TTS schedule as outpatient  Noncompliant with dialysis sessions.  Missed dialysis on Tuesday and Thursday  S/p urgent HD on 3/25/25 for hyperkalemia  Next dialysis today 3/27/25  Nephrology is following  Hyperkalemia  Potassium 6.5 on admission. Urgent dialysis session 3/25/25  Potassium normalized   Monitor with daily BMP  Type 2 diabetes mellitus, without long-term current use of insulin ([facility])  Lab Results   Component Value Date    HGBA1C 5.7 (H) 02/09/2025     A1c well-controlled  Not on medications at home  Diabetic diet  Hypertension  Continue Procardia XL  BP stable  Diabetic polyneuropathy associated with type 2 diabetes mellitus ([facility])  Continue gabapentin dose to 100 mg 3 times a week after dialysis  Paroxysmal atrial fibrillation ([facility])  Continue nifedipine  AC with Eliquis  Non-compliance with treatment  Patient noncompliant with dialysis sessions  and states he stopped taking all of his medications except for nifedipine for about 1 month  Discussed with patient to be compliant with medication/dialysis  Chronic diastolic (congestive) heart failure (HCC)  Wt Readings from Last 3 Encounters:   03/24/25 107 kg (234 lb 12.6 oz)   02/27/25 102 kg (224 lb 13.9 oz)   02/18/25 107 kg (235 lb)     Volume management with dialysis   Daily weights, I&O  1800mL FR  Anemia in ESRD (end-stage renal disease)  (HCC)    Results from last 7 days   Lab Units 03/28/25  0509 03/27/25  0444 03/26/25  0519 03/25/25  0531 03/24/25  1434   HEMOGLOBIN g/dL 9.6* 10.1* 10.3* 9.5* 10.3*   HEMATOCRIT % 30.9* 33.0* 34.0* 31.1* 33.7*   MCV fL 94 95 96 95 95   Hemoglobin is stable  Daily CBC    VTE Pharmacologic Prophylaxis:    ordered     Mobility:   Basic Mobility Inpatient Raw Score: 22  JH-HLM Goal: 7: Walk 25 feet or more  JH-HLM Achieved: 8: Walk 250 feet ot more  JH-HLM Goal achieved. Continue to encourage appropriate mobility.    Patient Centered Rounds: I performed bedside rounds with nursing staff today.   Discussions with Specialists or Other Care Team Provider: CM    Education and Discussions with Family / Patient: Patient declined call to .     Current Length of Stay: 3 day(s)  Current Patient Status: Inpatient   Certification Statement: The patient will continue to require additional inpatient hospital stay due to MRI foot  Discharge Plan: Anticipate discharge tomorrow to rehab facility. If MRI is negative.     Code Status: Level 1 - Full Code    Subjective   No acute complaints.     Objective :  Temp:  [97.2 °F (36.2 °C)-97.8 °F (36.6 °C)] 97.5 °F (36.4 °C)  HR:  [70-87] 70  BP: ()/(54-71) 130/54  Resp:  [17-20] 18  SpO2:  [89 %-99 %] 97 %    Body mass index is 30.98 kg/m².     Input and Output Summary (last 24 hours):     Intake/Output Summary (Last 24 hours) at 3/28/2025 1101  Last data filed at 3/27/2025 1801  Gross per 24 hour   Intake 870 ml   Output  2940 ml   Net -2070 ml       Physical Exam  Vitals and nursing note reviewed.   Cardiovascular:      Rate and Rhythm: Normal rate and regular rhythm.      Pulses: Normal pulses.      Heart sounds: Normal heart sounds.   Abdominal:      General: Bowel sounds are normal.      Palpations: Abdomen is soft.           Lines/Drains:  Lines/Drains/Airways       Active Status       Name Placement date Placement time Site Days    HD Permanent Double Catheter 08/30/23  --  Internal jugular  576                            Lab Results: I have reviewed the following results:   Results from last 7 days   Lab Units 03/28/25  0509 03/25/25  0531 03/24/25  1434   WBC Thousand/uL 8.68   < > 10.13   HEMOGLOBIN g/dL 9.6*   < > 10.3*   HEMATOCRIT % 30.9*   < > 33.7*   PLATELETS Thousands/uL 239   < > 182   SEGS PCT %  --   --  79*   LYMPHO PCT %  --   --  6*   MONO PCT %  --   --  11   EOS PCT %  --   --  3    < > = values in this interval not displayed.     Results from last 7 days   Lab Units 03/28/25  0509 03/26/25  0519 03/25/25  0531 03/24/25  1434   SODIUM mmol/L 130*   < > 132* 133*   POTASSIUM mmol/L 5.0   < > 5.4* 6.5*   CHLORIDE mmol/L 93*   < > 97 100   CO2 mmol/L 21   < > 21 19*   BUN mg/dL 49*   < > 65* 95*   CREATININE mg/dL 6.26*   < > 8.11* 10.44*   ANION GAP mmol/L 16*   < > 14* 14*   CALCIUM mg/dL 8.5   < > 7.8* 7.6*   ALBUMIN g/dL  --   --  3.5 3.9   TOTAL BILIRUBIN mg/dL  --   --   --  0.42   ALK PHOS U/L  --   --   --  94   ALT U/L  --   --   --  17   AST U/L  --   --   --  13   GLUCOSE RANDOM mg/dL 111   < > 118 169*    < > = values in this interval not displayed.         Results from last 7 days   Lab Units 03/24/25  1421   POC GLUCOSE mg/dl 182*               Recent Cultures (last 7 days):         Imaging Results Review: No pertinent imaging studies reviewed.  Other Study Results Review: No additional pertinent studies reviewed.    Last 24 Hours Medication List:     Current Facility-Administered Medications:      acetaminophen (TYLENOL) tablet 975 mg, Q8H KEMAL    allopurinol (ZYLOPRIM) tablet 100 mg, Daily    apixaban (ELIQUIS) tablet 5 mg, BID    aspirin chewable tablet 81 mg, Daily    atorvastatin (LIPITOR) tablet 80 mg, Daily With Dinner    gabapentin (NEURONTIN) capsule 100 mg, Once per day on Tuesday Thursday Saturday    HYDROmorphone (DILAUDID) injection 0.5 mg, Q6H PRN    influenza vaccine, high-dose (Fluzone High-Dose) IM injection 0.5 mL, Once    lidocaine (LIDODERM) 5 % patch 1 patch, Daily    methocarbamol (ROBAXIN) tablet 500 mg, Q6H PRN    NIFEdipine (PROCARDIA XL) 24 hr tablet 30 mg, After Dinner    oxyCODONE (ROXICODONE) split tablet 2.5 mg, Q8H PRN    sevelamer (RENAGEL) tablet 800 mg, TID With Meals    Administrative Statements   Today, Patient Was Seen By: Devante Shah MD  I have spent a total time of 35 minutes in caring for this patient on the day of the visit/encounter including Diagnostic results, Documenting in the medical record, Reviewing/placing orders in the medical record (including tests, medications, and/or procedures), Obtaining or reviewing history  , and Communicating with other healthcare professionals .    **Please Note: This note may have been constructed using a voice recognition system.**

## 2025-03-29 ENCOUNTER — APPOINTMENT (INPATIENT)
Dept: DIALYSIS | Facility: HOSPITAL | Age: 65
DRG: 463 | End: 2025-03-29
Payer: MEDICARE

## 2025-03-29 VITALS
RESPIRATION RATE: 16 BRPM | HEIGHT: 73 IN | TEMPERATURE: 98.5 F | DIASTOLIC BLOOD PRESSURE: 96 MMHG | HEART RATE: 89 BPM | SYSTOLIC BLOOD PRESSURE: 114 MMHG | WEIGHT: 234.79 LBS | BODY MASS INDEX: 31.12 KG/M2 | OXYGEN SATURATION: 96 %

## 2025-03-29 LAB
ANION GAP SERPL CALCULATED.3IONS-SCNC: 17 MMOL/L (ref 4–13)
BUN SERPL-MCNC: 67 MG/DL (ref 5–25)
CALCIUM SERPL-MCNC: 8.2 MG/DL (ref 8.4–10.2)
CHLORIDE SERPL-SCNC: 93 MMOL/L (ref 96–108)
CO2 SERPL-SCNC: 18 MMOL/L (ref 21–32)
CREAT SERPL-MCNC: 7.69 MG/DL (ref 0.6–1.3)
ERYTHROCYTE [DISTWIDTH] IN BLOOD BY AUTOMATED COUNT: 17 % (ref 11.6–15.1)
GFR SERPL CREATININE-BSD FRML MDRD: 6 ML/MIN/1.73SQ M
GLUCOSE SERPL-MCNC: 101 MG/DL (ref 65–140)
HCT VFR BLD AUTO: 29.3 % (ref 36.5–49.3)
HGB BLD-MCNC: 9.1 G/DL (ref 12–17)
MCH RBC QN AUTO: 29 PG (ref 26.8–34.3)
MCHC RBC AUTO-ENTMCNC: 31.1 G/DL (ref 31.4–37.4)
MCV RBC AUTO: 93 FL (ref 82–98)
PLATELET # BLD AUTO: 230 THOUSANDS/UL (ref 149–390)
PMV BLD AUTO: 9.5 FL (ref 8.9–12.7)
POTASSIUM SERPL-SCNC: 5.4 MMOL/L (ref 3.5–5.3)
RBC # BLD AUTO: 3.14 MILLION/UL (ref 3.88–5.62)
SODIUM SERPL-SCNC: 128 MMOL/L (ref 135–147)
WBC # BLD AUTO: 7.97 THOUSAND/UL (ref 4.31–10.16)

## 2025-03-29 PROCEDURE — 90935 HEMODIALYSIS ONE EVALUATION: CPT | Performed by: INTERNAL MEDICINE

## 2025-03-29 PROCEDURE — 80048 BASIC METABOLIC PNL TOTAL CA: CPT | Performed by: INTERNAL MEDICINE

## 2025-03-29 PROCEDURE — 99239 HOSP IP/OBS DSCHRG MGMT >30: CPT

## 2025-03-29 PROCEDURE — 85027 COMPLETE CBC AUTOMATED: CPT | Performed by: INTERNAL MEDICINE

## 2025-03-29 RX ORDER — GABAPENTIN 100 MG/1
100 CAPSULE ORAL 3 TIMES WEEKLY
Status: ON HOLD
Start: 2025-04-01

## 2025-03-29 RX ORDER — LIDOCAINE 50 MG/G
1 PATCH TOPICAL DAILY
Status: DISCONTINUED | OUTPATIENT
Start: 2025-03-29 | End: 2025-03-29 | Stop reason: HOSPADM

## 2025-03-29 RX ORDER — LIDOCAINE 50 MG/G
1 PATCH TOPICAL DAILY
Status: ON HOLD
Start: 2025-03-30

## 2025-03-29 RX ORDER — METHOCARBAMOL 500 MG/1
500 TABLET, FILM COATED ORAL EVERY 6 HOURS PRN
Status: ON HOLD
Start: 2025-03-29

## 2025-03-29 RX ORDER — ACETAMINOPHEN 325 MG/1
975 TABLET ORAL EVERY 8 HOURS SCHEDULED
Status: ON HOLD
Start: 2025-03-29

## 2025-03-29 RX ADMIN — METHOCARBAMOL 500 MG: 500 TABLET ORAL at 08:31

## 2025-03-29 RX ADMIN — ASPIRIN 81 MG CHEWABLE TABLET 81 MG: 81 TABLET CHEWABLE at 08:30

## 2025-03-29 RX ADMIN — SEVELAMER HYDROCHLORIDE 800 MG: 800 TABLET, FILM COATED ORAL at 08:31

## 2025-03-29 RX ADMIN — SEVELAMER HYDROCHLORIDE 800 MG: 800 TABLET, FILM COATED ORAL at 12:04

## 2025-03-29 RX ADMIN — APIXABAN 5 MG: 5 TABLET, FILM COATED ORAL at 08:30

## 2025-03-29 RX ADMIN — GABAPENTIN 100 MG: 100 CAPSULE ORAL at 08:30

## 2025-03-29 RX ADMIN — ALLOPURINOL 100 MG: 100 TABLET ORAL at 08:31

## 2025-03-29 RX ADMIN — LIDOCAINE 1 PATCH: 700 PATCH TOPICAL at 10:27

## 2025-03-29 RX ADMIN — Medication 2.5 MG: at 00:33

## 2025-03-29 RX ADMIN — HYDROMORPHONE HYDROCHLORIDE 0.5 MG: 1 INJECTION, SOLUTION INTRAMUSCULAR; INTRAVENOUS; SUBCUTANEOUS at 01:44

## 2025-03-29 RX ADMIN — ACETAMINOPHEN 975 MG: 325 TABLET ORAL at 13:46

## 2025-03-29 RX ADMIN — Medication 2.5 MG: at 10:27

## 2025-03-29 RX ADMIN — HYDROMORPHONE HYDROCHLORIDE 0.5 MG: 1 INJECTION, SOLUTION INTRAMUSCULAR; INTRAVENOUS; SUBCUTANEOUS at 08:05

## 2025-03-29 NOTE — ASSESSMENT & PLAN NOTE
Results from last 7 days   Lab Units 03/29/25  0628 03/28/25  0509 03/27/25  0444 03/26/25  0519 03/25/25  0531 03/24/25  1434   HEMOGLOBIN g/dL 9.1* 9.6* 10.1* 10.3* 9.5* 10.3*   HEMATOCRIT % 29.3* 30.9* 33.0* 34.0* 31.1* 33.7*   MCV fL 93 94 95 96 95 95   Hemoglobin is stable

## 2025-03-29 NOTE — OCCUPATIONAL THERAPY NOTE
Occupational Therapy Cancellation Note       03/29/25 1015   Note Type   Note Type Cancelled Session   Cancel Reasons Patient off floor/hemodialysis  (Chart review completed. Attempted to see pt for OT treatment however pt receiving dialysis. Will follow-up as time/schedule permits.)     Niharika Rizzo OTR/L   [place] License # [id number]  [place] License # [id number]

## 2025-03-29 NOTE — ASSESSMENT & PLAN NOTE
Lab Results   Component Value Date    EGFR 6 03/29/2025    EGFR 8 03/28/2025    EGFR 6 03/27/2025    CREATININE 7.69 (H) 03/29/2025    CREATININE 6.26 (H) 03/28/2025    CREATININE 8.23 (H) 03/27/2025     TTS schedule as outpatient  Noncompliant with dialysis sessions.  Missed dialysis on Tuesday and Thursday  S/p urgent HD on 3/25/25 for hyperkalemia  Nephrology following  Stable for discharge today after dialysis session   [FreeTextEntry1] : \par Patient comes for f/u. Palpitations stable. More compliant on meds. No LOC, no dizziness. BP was well controlled during the last week. HCTZ biw. 120//65.\par \par

## 2025-03-29 NOTE — PLAN OF CARE
Post-Dialysis RN Treatment Note    Blood Pressure:  Pre 127/54 mm/Hg  Post 114/96 mmHg   EDW:  99 kg    Weight:  Pre 105.5 kg   Post 102.5 kg   Mode of weight measurement: N/A   Volume Removed:  3000 ml    Treatment duration: 195 minutes    NS given:  No    Treatment shortened yes, due to discharge to rehab   Medications given during Rx: Not Applicable   Estimated Kt/V:  Not Applicable   Access type: Permacath/TDC   Needle Gauge:    Access Issues: No    Report called to primary nurse:   Yes / Robinson Espinoza RN      4000 ml as tolerated, 3.5 hrs, 2K bath  Problem: METABOLIC, FLUID AND ELECTROLYTES - ADULT  Goal: Electrolytes maintained within normal limits  Description: INTERVENTIONS:- Monitor labs and assess patient for signs and symptoms of electrolyte imbalances- Administer electrolyte replacement as ordered- Monitor response to electrolyte replacements, including repeat lab results as appropriate- Instruct patient on fluid and nutrition as appropriate  INTERVENTIONS:- Monitor labs and assess patient for signs and symptoms of electrolyte imbalances- Administer electrolyte replacement as ordered- Monitor response to electrolyte replacements, including repeat lab results as appropriate- Instruct patient on fluid and nutrition as appropriate  Outcome: Progressing  Goal: Fluid balance maintained  Description: INTERVENTIONS:- Monitor labs - Monitor I/O and WT- Instruct patient on fluid and nutrition as appropriate- Assess for signs & symptoms of volume excess or deficit  Outcome: Progressing

## 2025-03-29 NOTE — ASSESSMENT & PLAN NOTE
Patient presented to the ER after he fell in the kitchen on his side.  He could not get up and crawled over broken pieces of glass to get to his phone  CT scan shows acute L2 vertebral fracture  X-ray showed chronic right pubic ramus fracture  Pain control with Lidoderm patch, scheduled Tylenol, Robaxin and low-dose oxycodone as needed  LSO brace  PT/OT- will be discharged to STR

## 2025-03-29 NOTE — CASE MANAGEMENT
Case Management Discharge Planning Note    Patient name Naresh Carpio  Location 4 [place] 416/4 [place] 416-* MRN [id number]  : 1960 Date 3/29/2025       Current Admission Date: 3/24/2025  Current Admission Diagnosis:Closed fracture of second lumbar vertebra (HCC)   Patient Active Problem List    Diagnosis Date Noted Date Diagnosed    Multiple open wounds of foot 2025     Disorder of acid-base balance 2025     Closed fracture of second lumbar vertebra ([facility]) 2025     Non-compliance with treatment 2025     Generalized weakness 2025     Gout 2025     Closed fracture of proximal end of left humerus with routine healing 02/10/2025     Left shoulder pain 2025     ESRD (end stage renal disease) on dialysis ([facility]) 2025     Primary hypertension 2025     Anemia in ESRD (end-stage renal disease)  ([facility]) 2025     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, on chronic dialysis ([facility]) 2025     Renal lesion 2024     Chronic wound 2024     Chronic diastolic (congestive) heart failure ([facility]) 2024     Pulmonary edema 2024     Pleural effusion 2024     Elevated troponin 2024     Fall 2024     Melena 2024     Paroxysmal atrial fibrillation ([facility])      ESRD (end stage renal disease) ([facility]) 10/25/2024     Nausea 10/20/2024     Hyponatremia 10/19/2024     Chronic kidney disease-mineral and bone disorder 10/14/2024     Falls 10/08/2024     PAD (peripheral artery disease) ([facility]) 10/08/2024     Closed fracture of right pelvis ([facility]) 2024     Bilateral sacral fracture, closed ([facility]) 2024     Hyperkalemia 2024     Difficulty with speech 2024     History of amputation of hallux ([facility]) 2024     Hypertensive urgency 2024     Facial cellulitis 2024     Constipation 2023     Cellulitis of right foot      Polymicrobial bacterial infection  08/24/2023     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 mellitus, with necrosis of bone (HCC)      Acquired deformity of foot, right      Charcot's joint      Subacute osteomyelitis of right foot (HCC)      Open wound of right foot 08/21/2023     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 mellitus, with bone involvement without evidence of necrosis (HCC)      Diabetic ulcer of left midfoot associated with type 2 diabetes mellitus, limited to breakdown of skin (HCC)      Diabetic polyneuropathy associated with type 2 diabetes mellitus (HCC)      Diabetes mellitus type 2 with peripheral artery disease (HCC)      History of amputation of lesser toe of left foot (HCC)      Onychomycosis      Status post fall 08/19/2023     Traumatic rhabdomyolysis (HCC) 08/19/2023     Leukocytosis 08/19/2023     Osteoarthritis of left hip 07/27/2022     Severe Left Orchalgia 07/26/2022     Right shoulder pain 07/26/2022     Left hip pain 07/06/2022     Hemodialysis status ([facility])      Hypervolemia      Diarrhea 01/22/2022     Anemia due to chronic kidney disease, on chronic dialysis (HCC) 01/21/2022     Type 2 diabetes mellitus, without long-term current use of insulin (HCC)      Hypertension      History of TIA (transient ischemic attack)      T10 vertebral fracture (HCC)        LOS (days): 4  Geometric Mean LOS (GMLOS) (days): 4.6  Days to GMLOS:0.8     OBJECTIVE:  Risk of Unplanned Readmission Score: 59.61         Current admission status: Inpatient   Preferred Pharmacy:   [id number] [address] [place] [address] 206  [address] [address]  Phone: [phone] Fax: [phone]    Primary Care Provider: Jeffrey Jackson    Primary Insurance: MEDICARE  Secondary Insurance:     DISCHARGE DETAILS:    Discharge planning discussed with:: Patient  Freedom of Choice: Yes  Comments - Freedom of Choice: Confirmed choice is for [facility]  contacted family/caregiver?: Yes  Were Treatment Team discharge recommendations reviewed with patient/caregiver?: Yes  Did patient/caregiver verbalize understanding of patient care needs?: N/A- going to facility  Were patient/caregiver advised of the risks associated with not following Treatment Team discharge recommendations?: Yes    Contacts  Patient Contacts: Alicja Carpio  Relationship to Patient:: Family  Contact Method: Phone  Phone Number: [phone]  Reason/Outcome: Emergency Contact, Discharge Planning    Requested Home Health Care         Is the patient interested in HHC at discharge?: No    DME Referral Provided  Referral made for DME?: No    Treatment Team Recommendation: Short Term Rehab  Discharge Destination Plan:: Short Term Rehab ([facility])  Transport at Discharge : Wheelchair van     Number/Name of Dispatcher: [employer]  Transported by (Company and Unit #): [id number]  ETA of Transport (Date): 03/29/25  ETA of Transport (Time): 1530     IMM Given (Date):: 03/29/25  IMM Given to:: Patient  IMM reviewed with patient, patient agrees with discharge determination.     Additional Comments: Call made to [facility] liaison ([facility], [phone]) to confirm able to accept patient today. [facility] confirmed they are ready to accept patient and able to accept with 1530 transport. VM left for patient's family (Diana) notifying of DCP and details.    Accepting Facility Name, City & State : [place], [place]  Receiving Facility/Agency Phone Number: [phone]  Facility/Agency Fax Number: [phone]  Facility Insurance Auth Number: MEDICARE

## 2025-03-29 NOTE — ASSESSMENT & PLAN NOTE
Patient with twitching/tremors and some speech difficulty.  Per review of records had similar findings in March 2024 as well  CT head negative for acute pathology  Likely in the setting of gabapentin use  Now resolved s/p HD and reduction of gabapentin dose to 100 mg three times weekly after dialysis

## 2025-03-29 NOTE — ASSESSMENT & PLAN NOTE
Bilateral foot wounds, sustained from broken glass  Bilateral foot XRs show no definitive signs of osteomyelitis, no abnormalities within soft tissues noted, degenerative changes  Wound care nurse and podiatry consulted  S/p bedside wound debridement  MRI L foot was ordered, however, patient states he would not be able to tolerate lying flat due to pain  Discussed with podiatry, okay for discharge with close outpatient follow up and ongoing monitoring for signs of infection  Currently hemodynamically stable without signs of infection  Continue local wound care  Weightbearing as tolerated in surgical shoe to the left foot

## 2025-03-29 NOTE — [place] UNIVERSAL TRANSFER FORM
"[place] UNIVERSAL TRANSFER FORM  (ALL ITEMS MUST BE COMPLETED)    1. TRANSFER FROM: [facility]      TRANSFER TO: [facility]    2. DATE OF TRANSFER: 3/29/2025                        TIME OF TRANSFER: 1530    3. PATIENT NAME: Naresh Carpio D      YOB: 1960                             GENDER: male    4. LANGUAGE:   English    5. PHYSICIAN NAME:  Freddy Carrasquillo DO                   PHONE: [phone]    6. CODE STATUS: Level 1 - Full Code        Out of Hospital DNR Attached: No    7. :                                      :  Extended Emergency Contact Information  Primary Emergency Contact: Alicja Carpio  Mobile Phone: [phone]  Relation: Sister  Secondary Emergency Contact: Mercedes Resendez  Mobile Phone: [phone]  Relation: Friend           Health Care Representative/Proxy:  No           Legal Guardian:  No             NAME OF:           HEALTH CARE REPRESENTATIVE/PROXY:                                         OR           LEGAL GUARDIAN, IF NOT :                                               PHONE:  (Day)           (Night)                        (Cell)    8. REASON FOR TRANSFER: (Must include brief medical history and recent changes in physical function or cognition.) STR            V/S: /96 (BP Location: Right arm)   Pulse 89   Temp 98.5 °F (36.9 °C) (Tympanic)   Resp 16   Ht 6' 1\" (1.854 m)   Wt 107 kg (234 lb 12.6 oz)   SpO2 96%   BMI 30.98 kg/m²           PAIN: Yes, Rating 7, Site back, and Treatment pain meds    9. PRIMARY DIAGNOSIS: Closed fracture of second lumbar vertebra (HCC)      Secondary Diagnosis:         Pacemaker: No      Internal Defib: No          Mental Health Diagnosis (if Applicable):    10. RESTRAINTS: No     11. RESPIRATORY NEEDS: None    12. ISOLATION/PRECAUTION: MRSA    13. ALLERGY: Patient has no known allergies.    14. SENSORY:       Vision Good, Hearing Good , and " Speech Clear    15. SKIN CONDITION: Yes:  Othertrauma L foot,R shin    16. DIET: Special (describe)renal 2 gm 1800 fld restriction    17. IV ACCESS: None    18. PERSONAL ITEMS SENT WITH PATIENT: Otherclothes hat back brace    19. ATTACHED DOCUMENTS: MUST ATTACH CURRENT MEDICATION INFORMATION Face Sheet, MAR, Medication Reconciliation, Diagnostic Studies, Labs, Discharge Summary, PT Note, and OT Note    20. AT RISK ALERTS:Falls        HARM TO: N/A    21. WEIGHT BEARING STATUS:         Left Leg: Full        Right Leg: Full    22. MENTAL STATUS:Alert and Oriented    23. FUNCTION:        Walk: With Help        Transfer: With Help        Toilet: With Help        Feed: Self    24. IMMUNIZATIONS/SCREENING:     Immunization History   Administered Date(s) Administered    COVID-19 J&J ("[employer] vaccine 0.5 mL 05/07/2021    COVID-19 Pfizer Vac BIVALENT Boaz-sucrose 5 yr-11 yr IM 10/20/2022    Pneumococcal Polysaccharide PPV23 07/14/2022    Tdap 10/08/2024       25. BOWEL: Continent    26. BLADDER: Continent    27. SENDING FACILITY CONTACT: Robinson Espinoza                   Title: RN        Unit: [address] [place] [address]        Phone: [phone]          REC'G FACILITY CONTACT (if known):        Title:        Unit:         Phone:         FORM PREFILLED BY (if applicable)       Title:       Unit:        Phone:         FORM COMPLETED BY Robinson Espinoza RN      Title: [facility]      Phone: [phone]

## 2025-03-29 NOTE — DISCHARGE SUMMARY
Discharge Summary - Hospitalist   Name: Naresh Carpio 64 y.o. male I MRN: [id number]  Unit/Bed#: [address] [place] [address] I Date of Admission: 3/24/2025   Date of Service: 3/29/2025 I Hospital Day: 4     Assessment & Plan  Closed fracture of second lumbar vertebra (HCC)  Patient presented to the ER after he fell in the kitchen on his side.  He could not get up and crawled over broken pieces of glass to get to his phone  CT scan shows acute L2 vertebral fracture  X-ray showed chronic right pubic ramus fracture  Pain control with Lidoderm patch, scheduled Tylenol, Robaxin and low-dose oxycodone as needed  LSO brace  PT/OT- will be [facility] to [facility]  Multiple open wounds of foot  Bilateral foot wounds, sustained from broken glass  Bilateral foot XRs show no definitive signs of osteomyelitis, no abnormalities within soft tissues noted, degenerative changes  Wound care nurse and podiatry consulted  S/p bedside wound debridement  MRI L foot was ordered, however, patient states he would not be able to tolerate lying flat due to pain  Discussed with podiatry, okay for discharge with close outpatient follow up and ongoing monitoring for signs of infection  Currently hemodynamically stable without signs of infection  Continue local wound care  Weightbearing as tolerated in surgical shoe to the left foot  ESRD (end stage renal disease) on dialysis (HCC)  Lab Results   Component Value Date    EGFR 6 03/29/2025    EGFR 8 03/28/2025    EGFR 6 03/27/2025    CREATININE 7.69 (H) 03/29/2025    CREATININE 6.26 (H) 03/28/2025    CREATININE 8.23 (H) 03/27/2025     TTS schedule as outpatient  Noncompliant with dialysis sessions.  Missed dialysis on Tuesday and Thursday  S/p urgent HD on 3/25/25 for hyperkalemia  Nephrology following  Stable for discharge today after dialysis session  Hyperkalemia  Potassium 6.5 on admission. Urgent dialysis session 3/25/25  5.4 today, to be corrected with HD  Type 2 diabetes mellitus, without long-term current  use of insulin (HCC)  Lab Results   Component Value Date    HGBA1C 5.7 (H) 02/09/2025     A1c well-controlled  Not on medications at home  Diabetic diet  Hypertension  Continue Procardia XL  BP stable  Diabetic polyneuropathy associated with type 2 diabetes mellitus (HCC)  Continue gabapentin dose to 100 mg 3 times a week after dialysis  Paroxysmal atrial fibrillation (HCC)  Continue nifedipine  AC with Eliquis  Non-compliance with treatment  Patient noncompliant with dialysis sessions and states he stopped taking all of his medications except for nifedipine for about 1 month  Discussed with patient to be compliant with medication/dialysis  Chronic diastolic (congestive) heart failure (HCC)  Wt Readings from Last 3 Encounters:   03/24/25 107 kg (234 lb 12.6 oz)   02/27/25 102 kg (224 lb 13.9 oz)   02/18/25 107 kg (235 lb)     Volume management with dialysis   Daily weights, I&O  1800mL FR  Anemia in ESRD (end-stage renal disease)  ([facility])    Results from last 7 days   Lab Units 03/29/25  0628 03/28/25  0509 03/27/25  0444 03/26/25  0519 03/25/25  0531 03/24/25  1434   HEMOGLOBIN g/dL 9.1* 9.6* 10.1* 10.3* 9.5* 10.3*   HEMATOCRIT % 29.3* 30.9* 33.0* 34.0* 31.1* 33.7*   MCV fL 93 94 95 96 95 95   Hemoglobin is stable  Abnormal involuntary movement (Resolved: 3/26/2025)  Patient with twitching/tremors and some speech difficulty.  Per review of records had similar findings in March 2024 as well  CT head negative for acute pathology  Likely in the setting of gabapentin use  Now resolved s/p HD and reduction of gabapentin dose to 100 mg three times weekly after dialysis     Medical Problems       Resolved Problems  Date Reviewed: 3/29/2025          Resolved    Abnormal involuntary movement 3/26/2025     Resolved by  Winsome Esquivel PA-C    Disorders of fluid, electrolyte, and acid-base balance 3/25/2025     Resolved by  German Linton MD        Discharging Physician / Practitioner: Nallely Pisano PA-C  PCP: You  Yimei  Admission Date:   Admission Orders (From admission, onward)       Ordered        03/25/25 1337  INPATIENT ADMISSION  Once            03/24/25 1550  Place in Observation  Once                          Discharge Date: 03/29/25    Consultations During Hospital Stay:  Nephrology  Podiatry    Procedures Performed:   none    Significant Findings / Test Results:   CXR: No acute cardiopulmonary disease  XR L shoulder: Previously noted avulsion fracture better appreciated on prior study. Degenerative changes as described.   XR L hip/pelv: No acute osseous abnormality. Chronic right pubic ramus fractures noted. Degenerative changes as described.   CT lumbar spine: Acute fracture of the L2 vertebral body.   CT head: No acute intracranial abnormality.   CT abdomen pelvis: 1. Acute fracture of the L2 vertebral body. 2. At least small right pleural effusion. Groundglass opacities in the right lower lobe is likely atelectasis. Pneumonia is to be determined on clinical grounds.   XR R foot: 1. No convincing radiographic evidence of osteomyelitis. If high clinical suspicion persists, this would be better assessed with MRI. 2. No acute fracture or dislocation. 3. Chronic appearing fractures of the right 2nd proximal phalanx (with nonunion), and the 2nd and 4th metatarsals new from September 2023.   XR L foot: 1. Age-indeterminate nondisplaced fracture of the distal metaphysis of the left 4th proximal phalanx. Recommend correlation for point tenderness. 2. Limited evaluation for osteomyelitis without obvious findings to suggest its presence. If high clinical suspicion persists, this would be better assessed with MRI. 3. Additional findings as above.   VAS arterial duplex: RIGHT LOWER LIMB: Diffuse disease noted throughout the femoral-popliteal arteries. Evidence of 50-75% stenosis in the proximal popliteal artery. Evidence of 50-75% stenosis at the proximal tibio-peroneal trunk. LEFT LOWER LIMB: Diffuse disease noted throughout  the femoral-popliteal arteries. Evidence of >75% stenosis in the proximal popliteal artery. Patient refused SALVATORE. Compared to previous study on 11/22/2024, there is no significant change in duplex imaging.    Incidental Findings:   none    Test Results Pending at Discharge (will require follow up):   none     Outpatient Tests Requested:  none    Complications:  none    Reason for Admission: L2 vertebral fracture, hyperkalemia    Hospital Course:   Naresh Carpio is a 64 y.o. male patient who originally presented to the hospital on 3/24/2025 after a fall in the kitchen where he fell onto his side and could not get up.  He had to crawl over broken pieces of glass to get to the phone and called his sister and EMS.  He reports lower back pain and some left shoulder pain.  He denies any loss of consciousness or head trauma.  Patient missed 2 dialysis sessions prior to admission due to diarrhea which had resolved by admission.  He also reported he had not taken any of his medications except for nifedipine for the past month.  In the ED, imaging showed L2 vertebral fracture and he was noted to be hyperkalemic.  Nephrology was consulted and he underwent urgent dialysis session.  He was noted to have bilateral foot wounds and podiatry was consulted.  Bilateral foot x-rays were negative for signs of definitive osteomyelitis.  He was recommended an MRI of his left foot, however, declined due to pain.  Discussed with podiatry.  Okay for discharge given he has no current signs of infection with close outpatient follow-up and ongoing monitoring of wounds with local wound care.  Cleared by all consultants prior to discharge.  He will be discharged to short-term rehab at [facility].    Please see above list of diagnoses and related plan for additional information.     Condition at Discharge: stable    Discharge Day Visit / Exam:   Subjective:  Patient seen and examined during dialysis. Reports ongoing lower back pain.  "No new or worsening symptoms. No chest pain or shortness of breath. Agreeable for discharge to [facility] today.   Vitals: Blood Pressure: (!) 124/106 (03/29/25 1015)  Pulse: 74 (03/29/25 1000)  Temperature: 97.8 °F (36.6 °C) (03/29/25 0815)  Temp Source: Oral (03/29/25 0815)  Respirations: 16 (03/29/25 1000)  Height: 6' 1\" (185.4 cm) (03/24/25 1656)  Weight - Scale: 107 kg (234 lb 12.6 oz) (03/24/25 2230)  SpO2: 98 % (03/29/25 0930)  Physical Exam  Vitals and nursing note reviewed.   Constitutional:       General: He is not in acute distress.     Appearance: He is well-developed.   Cardiovascular:      Rate and Rhythm: Normal rate and regular rhythm.   Pulmonary:      Effort: Pulmonary effort is normal. No respiratory distress.      Breath sounds: Normal breath sounds.   Abdominal:      Palpations: Abdomen is soft.      Tenderness: There is no abdominal tenderness.   Musculoskeletal:         General: No swelling.      Comments: BLE dressings c/d/i   Skin:     General: Skin is warm and dry.      Capillary Refill: Capillary refill takes less than 2 seconds.   Neurological:      Mental Status: He is alert.   Psychiatric:         Mood and Affect: Mood normal.        Discussion with Family: Patient declined call to . States he will update his sister, case management did leave voicemail for sister.    Discharge instructions/Information to patient and family:   See after visit summary for information provided to patient and family.      Provisions for Follow-Up Care:  See after visit summary for information related to follow-up care and any pertinent home health orders.      Mobility at time of Discharge:   Basic Mobility Inpatient Raw Score: 16  JH-HLM Goal: 5: Stand one or more mins  JH-HLM Achieved: 7: Walk 25 feet or more  HLM Goal achieved. Continue to encourage appropriate mobility.     Disposition:   Other Skilled Nursing Facility at [facility]    Planned Readmission: none    Discharge " Medications:  See after visit summary for reconciled discharge medications provided to patient and/or family.      Administrative Statements   Discharge Statement:  I have spent a total time of 60 minutes in caring for this patient on the day of the visit/encounter. >30 minutes of time was spent on: Diagnostic results, Instructions for management, Patient and family education, Importance of tx compliance, Risk factor reductions, Impressions, Counseling / Coordination of care, Documenting in the medical record, Reviewing / ordering tests, medicine, procedures  , and Communicating with other healthcare professionals .    **Please Note: This note may have been constructed using a voice recognition system**

## 2025-03-29 NOTE — CASE MANAGEMENT
Case Management Progress Note    Patient name Naresh Carpio  Location 4 [place] 416/4 [place] 416-* MRN [id number]  : 1960 Date 3/29/2025       LOS (days): 4  Geometric Mean LOS (GMLOS) (days): 4.6  Days to GMLOS:0.8        OBJECTIVE:        Current admission status: Inpatient  Preferred Pharmacy:   [id number] - [place], [place] - [address] [place] [address]  [address] [address]  Phone: [phone] Fax: [phone]    Primary Care Provider: Jeffrey Jackson    Primary Insurance: MEDICARE  Secondary Insurance:     PROGRESS NOTE:    Per rounding, plan for D/C today after HD. Transport request pending in [facility].

## 2025-03-30 PROCEDURE — 93925 LOWER EXTREMITY STUDY: CPT | Performed by: SURGERY

## 2025-03-30 PROCEDURE — 93922 UPR/L XTREMITY ART 2 LEVELS: CPT | Performed by: SURGERY

## 2025-04-02 ENCOUNTER — HOSPITAL ENCOUNTER (EMERGENCY)
Facility: HOSPITAL | Age: 65
End: 2025-04-02
Attending: EMERGENCY MEDICINE
Payer: MEDICARE

## 2025-04-02 ENCOUNTER — APPOINTMENT (EMERGENCY)
Dept: RADIOLOGY | Facility: HOSPITAL | Age: 65
End: 2025-04-02
Payer: MEDICARE

## 2025-04-02 ENCOUNTER — ANESTHESIA (EMERGENCY)
Dept: RADIOLOGY | Facility: HOSPITAL | Age: 65
DRG: 907 | End: 2025-04-02
Payer: MEDICARE

## 2025-04-02 ENCOUNTER — ANESTHESIA EVENT (EMERGENCY)
Dept: RADIOLOGY | Facility: HOSPITAL | Age: 65
DRG: 907 | End: 2025-04-02
Payer: MEDICARE

## 2025-04-02 VITALS
OXYGEN SATURATION: 100 % | RESPIRATION RATE: 18 BRPM | SYSTOLIC BLOOD PRESSURE: 112 MMHG | TEMPERATURE: 98 F | HEART RATE: 91 BPM | DIASTOLIC BLOOD PRESSURE: 57 MMHG

## 2025-04-02 DIAGNOSIS — R58 ACTIVE INTERNAL BLEEDING: ICD-10-CM

## 2025-04-02 DIAGNOSIS — K68.3 RETROPERITONEAL HEMATOMA: Primary | ICD-10-CM

## 2025-04-02 DIAGNOSIS — D68.9 COAGULOPATHY (HCC): ICD-10-CM

## 2025-04-02 DIAGNOSIS — D64.9 ANEMIA: ICD-10-CM

## 2025-04-02 LAB
ABO GROUP BLD: NORMAL
ALBUMIN SERPL BCG-MCNC: 3.9 G/DL (ref 3.5–5)
ALP SERPL-CCNC: 67 U/L (ref 34–104)
ALT SERPL W P-5'-P-CCNC: 8 U/L (ref 7–52)
ANION GAP SERPL CALCULATED.3IONS-SCNC: 21 MMOL/L (ref 4–13)
APTT PPP: 35 SECONDS (ref 23–34)
AST SERPL W P-5'-P-CCNC: 12 U/L (ref 13–39)
BASOPHILS # BLD AUTO: 0.04 THOUSANDS/ÂΜL (ref 0–0.1)
BASOPHILS NFR BLD AUTO: 0 % (ref 0–1)
BILIRUB SERPL-MCNC: 0.92 MG/DL (ref 0.2–1)
BLD GP AB SCN SERPL QL: NEGATIVE
BUN SERPL-MCNC: 55 MG/DL (ref 5–25)
CALCIUM SERPL-MCNC: 9.1 MG/DL (ref 8.4–10.2)
CHLORIDE SERPL-SCNC: 91 MMOL/L (ref 96–108)
CO2 SERPL-SCNC: 19 MMOL/L (ref 21–32)
CREAT SERPL-MCNC: 6.6 MG/DL (ref 0.6–1.3)
EOSINOPHIL # BLD AUTO: 0.03 THOUSAND/ÂΜL (ref 0–0.61)
EOSINOPHIL NFR BLD AUTO: 0 % (ref 0–6)
ERYTHROCYTE [DISTWIDTH] IN BLOOD BY AUTOMATED COUNT: 16.2 % (ref 11.6–15.1)
GFR SERPL CREATININE-BSD FRML MDRD: 8 ML/MIN/1.73SQ M
GLUCOSE SERPL-MCNC: 139 MG/DL (ref 65–140)
HCT VFR BLD AUTO: 23.4 % (ref 36.5–49.3)
HGB BLD-MCNC: 7.2 G/DL (ref 12–17)
IMM GRANULOCYTES # BLD AUTO: 0.04 THOUSAND/UL (ref 0–0.2)
IMM GRANULOCYTES NFR BLD AUTO: 0 % (ref 0–2)
INR PPP: 1.61 (ref 0.85–1.19)
LYMPHOCYTES # BLD AUTO: 1.19 THOUSANDS/ÂΜL (ref 0.6–4.47)
LYMPHOCYTES NFR BLD AUTO: 13 % (ref 14–44)
MCH RBC QN AUTO: 28.2 PG (ref 26.8–34.3)
MCHC RBC AUTO-ENTMCNC: 30.8 G/DL (ref 31.4–37.4)
MCV RBC AUTO: 92 FL (ref 82–98)
MONOCYTES # BLD AUTO: 1.19 THOUSAND/ÂΜL (ref 0.17–1.22)
MONOCYTES NFR BLD AUTO: 13 % (ref 4–12)
NEUTROPHILS # BLD AUTO: 6.75 THOUSANDS/ÂΜL (ref 1.85–7.62)
NEUTS SEG NFR BLD AUTO: 74 % (ref 43–75)
NRBC BLD AUTO-RTO: 0 /100 WBCS
PLATELET # BLD AUTO: 340 THOUSANDS/UL (ref 149–390)
PMV BLD AUTO: 9.6 FL (ref 8.9–12.7)
POTASSIUM SERPL-SCNC: 5.4 MMOL/L (ref 3.5–5.3)
PROT SERPL-MCNC: 7.2 G/DL (ref 6.4–8.4)
PROTHROMBIN TIME: 19.6 SECONDS (ref 12.3–15)
RBC # BLD AUTO: 2.55 MILLION/UL (ref 3.88–5.62)
RH BLD: POSITIVE
SODIUM SERPL-SCNC: 131 MMOL/L (ref 135–147)
SPECIMEN EXPIRATION DATE: NORMAL
WBC # BLD AUTO: 9.24 THOUSAND/UL (ref 4.31–10.16)

## 2025-04-02 PROCEDURE — 86923 COMPATIBILITY TEST ELECTRIC: CPT

## 2025-04-02 PROCEDURE — 85730 THROMBOPLASTIN TIME PARTIAL: CPT | Performed by: EMERGENCY MEDICINE

## 2025-04-02 PROCEDURE — NC001 PR NO CHARGE: Performed by: RADIOLOGY

## 2025-04-02 PROCEDURE — 99291 CRITICAL CARE FIRST HOUR: CPT | Performed by: EMERGENCY MEDICINE

## 2025-04-02 PROCEDURE — 36415 COLL VENOUS BLD VENIPUNCTURE: CPT | Performed by: EMERGENCY MEDICINE

## 2025-04-02 PROCEDURE — 71250 CT THORAX DX C-: CPT

## 2025-04-02 PROCEDURE — 36430 TRANSFUSION BLD/BLD COMPNT: CPT

## 2025-04-02 PROCEDURE — 80053 COMPREHEN METABOLIC PANEL: CPT | Performed by: EMERGENCY MEDICINE

## 2025-04-02 PROCEDURE — 99284 EMERGENCY DEPT VISIT MOD MDM: CPT

## 2025-04-02 PROCEDURE — 86901 BLOOD TYPING SEROLOGIC RH(D): CPT | Performed by: EMERGENCY MEDICINE

## 2025-04-02 PROCEDURE — 86850 RBC ANTIBODY SCREEN: CPT | Performed by: EMERGENCY MEDICINE

## 2025-04-02 PROCEDURE — 96376 TX/PRO/DX INJ SAME DRUG ADON: CPT

## 2025-04-02 PROCEDURE — 96374 THER/PROPH/DIAG INJ IV PUSH: CPT

## 2025-04-02 PROCEDURE — 86900 BLOOD TYPING SEROLOGIC ABO: CPT | Performed by: EMERGENCY MEDICINE

## 2025-04-02 PROCEDURE — 85610 PROTHROMBIN TIME: CPT | Performed by: EMERGENCY MEDICINE

## 2025-04-02 PROCEDURE — P9016 RBC LEUKOCYTES REDUCED: HCPCS

## 2025-04-02 PROCEDURE — 74174 CTA ABD&PLVS W/CONTRAST: CPT

## 2025-04-02 PROCEDURE — 85025 COMPLETE CBC W/AUTO DIFF WBC: CPT | Performed by: EMERGENCY MEDICINE

## 2025-04-02 PROCEDURE — 74176 CT ABD & PELVIS W/O CONTRAST: CPT

## 2025-04-02 RX ORDER — ROCURONIUM BROMIDE 10 MG/ML
INJECTION, SOLUTION INTRAVENOUS AS NEEDED
Status: DISCONTINUED | OUTPATIENT
Start: 2025-04-02 | End: 2025-04-03

## 2025-04-02 RX ORDER — FENTANYL CITRATE 50 UG/ML
INJECTION, SOLUTION INTRAMUSCULAR; INTRAVENOUS AS NEEDED
Status: COMPLETED | OUTPATIENT
Start: 2025-04-02 | End: 2025-04-03

## 2025-04-02 RX ORDER — HYDROMORPHONE HCL/PF 1 MG/ML
0.5 SYRINGE (ML) INJECTION ONCE
Refills: 0 | Status: COMPLETED | OUTPATIENT
Start: 2025-04-02 | End: 2025-04-02

## 2025-04-02 RX ORDER — SODIUM CHLORIDE 9 MG/ML
150 INJECTION, SOLUTION INTRAVENOUS CONTINUOUS
Status: DISCONTINUED | OUTPATIENT
Start: 2025-04-02 | End: 2025-04-03 | Stop reason: HOSPADM

## 2025-04-02 RX ORDER — SODIUM CHLORIDE 9 MG/ML
150 INJECTION, SOLUTION INTRAVENOUS CONTINUOUS
Status: DISCONTINUED | OUTPATIENT
Start: 2025-04-02 | End: 2025-04-02

## 2025-04-02 RX ORDER — PROPOFOL 10 MG/ML
INJECTION, EMULSION INTRAVENOUS AS NEEDED
Status: DISCONTINUED | OUTPATIENT
Start: 2025-04-02 | End: 2025-04-03

## 2025-04-02 RX ORDER — FENTANYL CITRATE 50 UG/ML
INJECTION, SOLUTION INTRAMUSCULAR; INTRAVENOUS AS NEEDED
Status: COMPLETED | OUTPATIENT
Start: 2025-04-02 | End: 2025-04-02

## 2025-04-02 RX ORDER — MIDAZOLAM HYDROCHLORIDE 2 MG/2ML
INJECTION, SOLUTION INTRAMUSCULAR; INTRAVENOUS AS NEEDED
Status: DISCONTINUED | OUTPATIENT
Start: 2025-04-02 | End: 2025-04-03

## 2025-04-02 RX ADMIN — IOHEXOL 100 ML: 350 INJECTION, SOLUTION INTRAVENOUS at 21:54

## 2025-04-02 RX ADMIN — HYDROMORPHONE HYDROCHLORIDE 0.5 MG: 1 INJECTION, SOLUTION INTRAMUSCULAR; INTRAVENOUS; SUBCUTANEOUS at 20:25

## 2025-04-02 RX ADMIN — MIDAZOLAM 2 MG: 1 INJECTION INTRAMUSCULAR; INTRAVENOUS at 23:49

## 2025-04-02 RX ADMIN — ROCURONIUM BROMIDE 50 MG: 10 INJECTION, SOLUTION INTRAVENOUS at 23:52

## 2025-04-02 RX ADMIN — FENTANYL CITRATE 50 MCG: 50 INJECTION INTRAMUSCULAR; INTRAVENOUS at 23:35

## 2025-04-02 RX ADMIN — NOREPINEPHRINE BITARTRATE 8 MCG: 1 INJECTION, SOLUTION, CONCENTRATE INTRAVENOUS at 23:52

## 2025-04-02 RX ADMIN — SODIUM CHLORIDE: 0.9 INJECTION, SOLUTION INTRAVENOUS at 23:37

## 2025-04-02 RX ADMIN — PROPOFOL 120 MG: 10 INJECTION, EMULSION INTRAVENOUS at 23:52

## 2025-04-02 RX ADMIN — Medication 2000 UNITS: at 21:29

## 2025-04-02 RX ADMIN — FENTANYL CITRATE 50 MCG: 50 INJECTION INTRAMUSCULAR; INTRAVENOUS at 23:48

## 2025-04-02 RX ADMIN — HYDROMORPHONE HYDROCHLORIDE 0.5 MG: 1 INJECTION, SOLUTION INTRAMUSCULAR; INTRAVENOUS; SUBCUTANEOUS at 21:20

## 2025-04-03 ENCOUNTER — APPOINTMENT (INPATIENT)
Dept: DIALYSIS | Facility: HOSPITAL | Age: 65
DRG: 907 | End: 2025-04-03
Payer: MEDICARE

## 2025-04-03 ENCOUNTER — HOSPITAL ENCOUNTER (INPATIENT)
Facility: HOSPITAL | Age: 65
LOS: 8 days | Discharge: NURSING, REHAB OR LONG-TERM CARE | DRG: 907 | End: 2025-04-11
Attending: SURGERY | Admitting: SURGERY
Payer: MEDICARE

## 2025-04-03 DIAGNOSIS — T14.8XXA HEMATOMA: Primary | ICD-10-CM

## 2025-04-03 DIAGNOSIS — N18.6 ESRD (END STAGE RENAL DISEASE) (HCC): ICD-10-CM

## 2025-04-03 DIAGNOSIS — Z86.73 HISTORY OF TIA (TRANSIENT ISCHEMIC ATTACK): ICD-10-CM

## 2025-04-03 DIAGNOSIS — G89.11 ACUTE PAIN DUE TO TRAUMA: ICD-10-CM

## 2025-04-03 DIAGNOSIS — S32.029D CLOSED FRACTURE OF SECOND LUMBAR VERTEBRA WITH ROUTINE HEALING, UNSPECIFIED FRACTURE MORPHOLOGY, SUBSEQUENT ENCOUNTER: ICD-10-CM

## 2025-04-03 PROBLEM — Z91.89 AT RISK FOR ELECTROLYTE IMBALANCE: Status: ACTIVE | Noted: 2025-04-03

## 2025-04-03 PROBLEM — I16.0 HYPERTENSIVE URGENCY: Status: RESOLVED | Noted: 2024-03-12 | Resolved: 2025-04-03

## 2025-04-03 PROBLEM — J81.1 PULMONARY EDEMA: Status: RESOLVED | Noted: 2024-11-19 | Resolved: 2025-04-03

## 2025-04-03 PROBLEM — S36.899A TRAUMATIC RETROPERITONEAL HEMORRHAGE: Status: ACTIVE | Noted: 2025-04-03

## 2025-04-03 PROBLEM — D72.829 LEUKOCYTOSIS: Status: RESOLVED | Noted: 2023-08-19 | Resolved: 2025-04-03

## 2025-04-03 PROBLEM — N25.81 SECONDARY HYPERPARATHYROIDISM (HCC): Status: ACTIVE | Noted: 2025-04-03

## 2025-04-03 LAB
ABO GROUP BLD BPU: NORMAL
ABO GROUP BLD: NORMAL
ABO GROUP BLD: NORMAL
ALBUMIN SERPL BCG-MCNC: 3.4 G/DL (ref 3.5–5)
ALBUMIN SERPL BCG-MCNC: 3.5 G/DL (ref 3.5–5)
ALP SERPL-CCNC: 59 U/L (ref 34–104)
ALP SERPL-CCNC: 62 U/L (ref 34–104)
ALT SERPL W P-5'-P-CCNC: 7 U/L (ref 7–52)
ALT SERPL W P-5'-P-CCNC: 7 U/L (ref 7–52)
ANION GAP SERPL CALCULATED.3IONS-SCNC: 12 MMOL/L (ref 4–13)
ANION GAP SERPL CALCULATED.3IONS-SCNC: 17 MMOL/L (ref 4–13)
APTT PPP: 29 SECONDS (ref 23–34)
AST SERPL W P-5'-P-CCNC: 10 U/L (ref 13–39)
AST SERPL W P-5'-P-CCNC: 14 U/L (ref 13–39)
ATRIAL RATE: 87 BPM
BASE EX.OXY STD BLDV CALC-SCNC: 84.2 % (ref 60–80)
BASE EXCESS BLDV CALC-SCNC: -3.3 MMOL/L
BASOPHILS # BLD AUTO: 0.02 THOUSANDS/ÂΜL (ref 0–0.1)
BASOPHILS # BLD AUTO: 0.02 THOUSANDS/ÂΜL (ref 0–0.1)
BASOPHILS NFR BLD AUTO: 0 % (ref 0–1)
BASOPHILS NFR BLD AUTO: 0 % (ref 0–1)
BILIRUB SERPL-MCNC: 1.12 MG/DL (ref 0.2–1)
BILIRUB SERPL-MCNC: 1.14 MG/DL (ref 0.2–1)
BLD GP AB SCN SERPL QL: NEGATIVE
BPU ID: NORMAL
BUN SERPL-MCNC: 58 MG/DL (ref 5–25)
BUN SERPL-MCNC: 63 MG/DL (ref 5–25)
CA-I BLD-SCNC: 1.05 MMOL/L (ref 1.12–1.32)
CALCIUM ALBUM COR SERPL-MCNC: 8.7 MG/DL (ref 8.3–10.1)
CALCIUM SERPL-MCNC: 8.2 MG/DL (ref 8.4–10.2)
CALCIUM SERPL-MCNC: 8.5 MG/DL (ref 8.4–10.2)
CFFMA (FUNCTIONAL FIBRINOGEN MAX AMPLITUDE): 34.6 MM (ref 15–32)
CHLORIDE SERPL-SCNC: 93 MMOL/L (ref 96–108)
CHLORIDE SERPL-SCNC: 94 MMOL/L (ref 96–108)
CKLY30: 0 % (ref 0–2.6)
CKR(REACTION TIME): 6.6 MIN (ref 4.6–9.1)
CO2 SERPL-SCNC: 22 MMOL/L (ref 21–32)
CO2 SERPL-SCNC: 27 MMOL/L (ref 21–32)
CREAT SERPL-MCNC: 6.78 MG/DL (ref 0.6–1.3)
CREAT SERPL-MCNC: 7 MG/DL (ref 0.6–1.3)
CROSSMATCH: NORMAL
CRTMA(RAPIDTEG MAX AMPLITUDE): 70.5 MM (ref 52–70)
EOSINOPHIL # BLD AUTO: 0 THOUSAND/ÂΜL (ref 0–0.61)
EOSINOPHIL # BLD AUTO: 0.01 THOUSAND/ÂΜL (ref 0–0.61)
EOSINOPHIL NFR BLD AUTO: 0 % (ref 0–6)
EOSINOPHIL NFR BLD AUTO: 0 % (ref 0–6)
ERYTHROCYTE [DISTWIDTH] IN BLOOD BY AUTOMATED COUNT: 15.9 % (ref 11.6–15.1)
ERYTHROCYTE [DISTWIDTH] IN BLOOD BY AUTOMATED COUNT: 16 % (ref 11.6–15.1)
GFR SERPL CREATININE-BSD FRML MDRD: 7 ML/MIN/1.73SQ M
GFR SERPL CREATININE-BSD FRML MDRD: 7 ML/MIN/1.73SQ M
GLUCOSE SERPL-MCNC: 109 MG/DL (ref 65–140)
GLUCOSE SERPL-MCNC: 135 MG/DL (ref 65–140)
GLUCOSE SERPL-MCNC: 137 MG/DL (ref 65–140)
GLUCOSE SERPL-MCNC: 140 MG/DL (ref 65–140)
GLUCOSE SERPL-MCNC: 145 MG/DL (ref 65–140)
GLUCOSE SERPL-MCNC: 154 MG/DL (ref 65–140)
HCO3 BLDV-SCNC: 22.1 MMOL/L (ref 24–30)
HCT VFR BLD AUTO: 20.7 % (ref 36.5–49.3)
HCT VFR BLD AUTO: 23.7 % (ref 36.5–49.3)
HGB BLD-MCNC: 6.5 G/DL (ref 12–17)
HGB BLD-MCNC: 7.5 G/DL (ref 12–17)
HGB BLD-MCNC: 7.6 G/DL (ref 12–17)
IMM GRANULOCYTES # BLD AUTO: 0.09 THOUSAND/UL (ref 0–0.2)
IMM GRANULOCYTES # BLD AUTO: 0.1 THOUSAND/UL (ref 0–0.2)
IMM GRANULOCYTES NFR BLD AUTO: 1 % (ref 0–2)
IMM GRANULOCYTES NFR BLD AUTO: 1 % (ref 0–2)
INR PPP: 1.51 (ref 0.85–1.19)
LACTATE SERPL-SCNC: 0.9 MMOL/L (ref 0.5–2)
LYMPHOCYTES # BLD AUTO: 0.51 THOUSANDS/ÂΜL (ref 0.6–4.47)
LYMPHOCYTES # BLD AUTO: 0.65 THOUSANDS/ÂΜL (ref 0.6–4.47)
LYMPHOCYTES NFR BLD AUTO: 6 % (ref 14–44)
LYMPHOCYTES NFR BLD AUTO: 7 % (ref 14–44)
MAGNESIUM SERPL-MCNC: 2.1 MG/DL (ref 1.9–2.7)
MAGNESIUM SERPL-MCNC: 2.2 MG/DL (ref 1.9–2.7)
MCH RBC QN AUTO: 29.1 PG (ref 26.8–34.3)
MCH RBC QN AUTO: 29.3 PG (ref 26.8–34.3)
MCHC RBC AUTO-ENTMCNC: 31.4 G/DL (ref 31.4–37.4)
MCHC RBC AUTO-ENTMCNC: 31.6 G/DL (ref 31.4–37.4)
MCV RBC AUTO: 92 FL (ref 82–98)
MCV RBC AUTO: 93 FL (ref 82–98)
MONOCYTES # BLD AUTO: 0.9 THOUSAND/ÂΜL (ref 0.17–1.22)
MONOCYTES # BLD AUTO: 1.08 THOUSAND/ÂΜL (ref 0.17–1.22)
MONOCYTES NFR BLD AUTO: 10 % (ref 4–12)
MONOCYTES NFR BLD AUTO: 11 % (ref 4–12)
NEUTROPHILS # BLD AUTO: 7.22 THOUSANDS/ÂΜL (ref 1.85–7.62)
NEUTROPHILS # BLD AUTO: 7.79 THOUSANDS/ÂΜL (ref 1.85–7.62)
NEUTS SEG NFR BLD AUTO: 81 % (ref 43–75)
NEUTS SEG NFR BLD AUTO: 83 % (ref 43–75)
NRBC BLD AUTO-RTO: 0 /100 WBCS
NRBC BLD AUTO-RTO: 0 /100 WBCS
O2 CT BLDV-SCNC: 8.4 ML/DL
P AXIS: 71 DEGREES
PCO2 BLDV: 41.2 MM HG (ref 42–50)
PH BLDV: 7.35 [PH] (ref 7.3–7.4)
PHOSPHATE SERPL-MCNC: 7.7 MG/DL (ref 2.3–4.1)
PHOSPHATE SERPL-MCNC: 8.3 MG/DL (ref 2.3–4.1)
PLATELET # BLD AUTO: 264 THOUSANDS/UL (ref 149–390)
PLATELET # BLD AUTO: 267 THOUSANDS/UL (ref 149–390)
PMV BLD AUTO: 9.4 FL (ref 8.9–12.7)
PMV BLD AUTO: 9.6 FL (ref 8.9–12.7)
PO2 BLDV: 60.5 MM HG (ref 35–45)
POTASSIUM SERPL-SCNC: 5 MMOL/L (ref 3.5–5.3)
POTASSIUM SERPL-SCNC: 5.3 MMOL/L (ref 3.5–5.3)
PR INTERVAL: 182 MS
PROT SERPL-MCNC: 6.3 G/DL (ref 6.4–8.4)
PROT SERPL-MCNC: 6.7 G/DL (ref 6.4–8.4)
PROTHROMBIN TIME: 18.4 SECONDS (ref 12.3–15)
QRS AXIS: 16 DEGREES
QRSD INTERVAL: 86 MS
QT INTERVAL: 384 MS
QTC INTERVAL: 462 MS
RBC # BLD AUTO: 2.22 MILLION/UL (ref 3.88–5.62)
RBC # BLD AUTO: 2.58 MILLION/UL (ref 3.88–5.62)
RH BLD: POSITIVE
RH BLD: POSITIVE
SODIUM SERPL-SCNC: 132 MMOL/L (ref 135–147)
SODIUM SERPL-SCNC: 133 MMOL/L (ref 135–147)
SPECIMEN EXPIRATION DATE: NORMAL
T WAVE AXIS: 152 DEGREES
UNIT DISPENSE STATUS: NORMAL
UNIT PRODUCT CODE: NORMAL
UNIT PRODUCT VOLUME: 350 ML
UNIT RH: NORMAL
VENTRICULAR RATE: 87 BPM
WBC # BLD AUTO: 8.74 THOUSAND/UL (ref 4.31–10.16)
WBC # BLD AUTO: 9.65 THOUSAND/UL (ref 4.31–10.16)

## 2025-04-03 PROCEDURE — 37244 VASC EMBOLIZE/OCCLUDE BLEED: CPT | Performed by: RADIOLOGY

## 2025-04-03 PROCEDURE — 82803 BLOOD GASES ANY COMBINATION: CPT

## 2025-04-03 PROCEDURE — 82947 ASSAY GLUCOSE BLOOD QUANT: CPT

## 2025-04-03 PROCEDURE — 36248 INS CATH ABD/L-EXT ART ADDL: CPT | Performed by: RADIOLOGY

## 2025-04-03 PROCEDURE — 83605 ASSAY OF LACTIC ACID: CPT

## 2025-04-03 PROCEDURE — 82948 REAGENT STRIP/BLOOD GLUCOSE: CPT

## 2025-04-03 PROCEDURE — 84295 ASSAY OF SERUM SODIUM: CPT

## 2025-04-03 PROCEDURE — 85610 PROTHROMBIN TIME: CPT

## 2025-04-03 PROCEDURE — 83735 ASSAY OF MAGNESIUM: CPT

## 2025-04-03 PROCEDURE — 04LE3DZ OCCLUSION OF RIGHT INTERNAL ILIAC ARTERY WITH INTRALUMINAL DEVICE, PERCUTANEOUS APPROACH: ICD-10-PCS | Performed by: RADIOLOGY

## 2025-04-03 PROCEDURE — 85397 CLOTTING FUNCT ACTIVITY: CPT

## 2025-04-03 PROCEDURE — 86923 COMPATIBILITY TEST ELECTRIC: CPT

## 2025-04-03 PROCEDURE — 85347 COAGULATION TIME ACTIVATED: CPT

## 2025-04-03 PROCEDURE — 86920 COMPATIBILITY TEST SPIN: CPT

## 2025-04-03 PROCEDURE — 82330 ASSAY OF CALCIUM: CPT

## 2025-04-03 PROCEDURE — P9016 RBC LEUKOCYTES REDUCED: HCPCS

## 2025-04-03 PROCEDURE — 99232 SBSQ HOSP IP/OBS MODERATE 35: CPT | Performed by: NURSE PRACTITIONER

## 2025-04-03 PROCEDURE — 86901 BLOOD TYPING SEROLOGIC RH(D): CPT

## 2025-04-03 PROCEDURE — 85576 BLOOD PLATELET AGGREGATION: CPT

## 2025-04-03 PROCEDURE — 36247 INS CATH ABD/L-EXT ART 3RD: CPT | Performed by: RADIOLOGY

## 2025-04-03 PROCEDURE — 85025 COMPLETE CBC W/AUTO DIFF WBC: CPT

## 2025-04-03 PROCEDURE — 36415 COLL VENOUS BLD VENIPUNCTURE: CPT

## 2025-04-03 PROCEDURE — 86900 BLOOD TYPING SEROLOGIC ABO: CPT

## 2025-04-03 PROCEDURE — 93005 ELECTROCARDIOGRAM TRACING: CPT

## 2025-04-03 PROCEDURE — 86850 RBC ANTIBODY SCREEN: CPT

## 2025-04-03 PROCEDURE — 80053 COMPREHEN METABOLIC PANEL: CPT

## 2025-04-03 PROCEDURE — 5A1D70Z PERFORMANCE OF URINARY FILTRATION, INTERMITTENT, LESS THAN 6 HOURS PER DAY: ICD-10-PCS | Performed by: INTERNAL MEDICINE

## 2025-04-03 PROCEDURE — 84132 ASSAY OF SERUM POTASSIUM: CPT

## 2025-04-03 PROCEDURE — 04LH3DZ OCCLUSION OF RIGHT EXTERNAL ILIAC ARTERY WITH INTRALUMINAL DEVICE, PERCUTANEOUS APPROACH: ICD-10-PCS | Performed by: RADIOLOGY

## 2025-04-03 PROCEDURE — 84100 ASSAY OF PHOSPHORUS: CPT

## 2025-04-03 PROCEDURE — NC001 PR NO CHARGE

## 2025-04-03 PROCEDURE — 93010 ELECTROCARDIOGRAM REPORT: CPT | Performed by: INTERNAL MEDICINE

## 2025-04-03 PROCEDURE — 85384 FIBRINOGEN ACTIVITY: CPT

## 2025-04-03 PROCEDURE — 99223 1ST HOSP IP/OBS HIGH 75: CPT | Performed by: INTERNAL MEDICINE

## 2025-04-03 PROCEDURE — 85014 HEMATOCRIT: CPT

## 2025-04-03 PROCEDURE — 82805 BLOOD GASES W/O2 SATURATION: CPT

## 2025-04-03 PROCEDURE — 90935 HEMODIALYSIS ONE EVALUATION: CPT | Performed by: INTERNAL MEDICINE

## 2025-04-03 PROCEDURE — 85018 HEMOGLOBIN: CPT

## 2025-04-03 PROCEDURE — B41C1ZZ FLUOROSCOPY OF PELVIC ARTERIES USING LOW OSMOLAR CONTRAST: ICD-10-PCS | Performed by: RADIOLOGY

## 2025-04-03 PROCEDURE — 85730 THROMBOPLASTIN TIME PARTIAL: CPT

## 2025-04-03 RX ORDER — HEPARIN SODIUM 5000 [USP'U]/ML
5000 INJECTION, SOLUTION INTRAVENOUS; SUBCUTANEOUS EVERY 8 HOURS SCHEDULED
Status: DISCONTINUED | OUTPATIENT
Start: 2025-04-03 | End: 2025-04-05

## 2025-04-03 RX ORDER — METHOCARBAMOL 500 MG/1
500 TABLET, FILM COATED ORAL EVERY 6 HOURS PRN
Status: DISCONTINUED | OUTPATIENT
Start: 2025-04-03 | End: 2025-04-06

## 2025-04-03 RX ORDER — IODIXANOL 320 MG/ML
200 INJECTION, SOLUTION INTRAVASCULAR
Status: COMPLETED | OUTPATIENT
Start: 2025-04-03 | End: 2025-04-03

## 2025-04-03 RX ORDER — SODIUM CHLORIDE 9 MG/ML
50 INJECTION, SOLUTION INTRAVENOUS CONTINUOUS
Status: DISCONTINUED | OUTPATIENT
Start: 2025-04-03 | End: 2025-04-03

## 2025-04-03 RX ORDER — LIDOCAINE 50 MG/G
1 PATCH TOPICAL DAILY PRN
Status: DISCONTINUED | OUTPATIENT
Start: 2025-04-03 | End: 2025-04-11 | Stop reason: HOSPADM

## 2025-04-03 RX ORDER — SEVELAMER HYDROCHLORIDE 800 MG/1
1600 TABLET, FILM COATED ORAL
Status: DISCONTINUED | OUTPATIENT
Start: 2025-04-03 | End: 2025-04-11 | Stop reason: HOSPADM

## 2025-04-03 RX ORDER — HYDROMORPHONE HCL/PF 1 MG/ML
0.5 SYRINGE (ML) INJECTION
Status: COMPLETED | OUTPATIENT
Start: 2025-04-03 | End: 2025-04-03

## 2025-04-03 RX ORDER — SEVELAMER HYDROCHLORIDE 800 MG/1
800 TABLET, FILM COATED ORAL
Status: DISCONTINUED | OUTPATIENT
Start: 2025-04-03 | End: 2025-04-03

## 2025-04-03 RX ORDER — DEXAMETHASONE SODIUM PHOSPHATE 10 MG/ML
8 INJECTION, SOLUTION INTRAMUSCULAR; INTRAVENOUS ONCE AS NEEDED
Status: DISCONTINUED | OUTPATIENT
Start: 2025-04-03 | End: 2025-04-03

## 2025-04-03 RX ORDER — AMOXICILLIN 250 MG
1 CAPSULE ORAL
Status: DISCONTINUED | OUTPATIENT
Start: 2025-04-03 | End: 2025-04-11 | Stop reason: HOSPADM

## 2025-04-03 RX ORDER — CHLORHEXIDINE GLUCONATE ORAL RINSE 1.2 MG/ML
15 SOLUTION DENTAL EVERY 12 HOURS SCHEDULED
Status: DISCONTINUED | OUTPATIENT
Start: 2025-04-03 | End: 2025-04-11 | Stop reason: HOSPADM

## 2025-04-03 RX ORDER — SODIUM CHLORIDE 9 MG/ML
INJECTION, SOLUTION INTRAVENOUS CONTINUOUS PRN
Status: DISCONTINUED | OUTPATIENT
Start: 2025-04-03 | End: 2025-04-03

## 2025-04-03 RX ORDER — ACETAMINOPHEN 325 MG/1
975 TABLET ORAL EVERY 8 HOURS SCHEDULED
Status: DISCONTINUED | OUTPATIENT
Start: 2025-04-03 | End: 2025-04-11 | Stop reason: HOSPADM

## 2025-04-03 RX ORDER — ATORVASTATIN CALCIUM 80 MG/1
80 TABLET, FILM COATED ORAL
Status: DISCONTINUED | OUTPATIENT
Start: 2025-04-03 | End: 2025-04-11 | Stop reason: HOSPADM

## 2025-04-03 RX ORDER — GABAPENTIN 100 MG/1
100 CAPSULE ORAL 3 TIMES WEEKLY
Status: DISCONTINUED | OUTPATIENT
Start: 2025-04-03 | End: 2025-04-11 | Stop reason: HOSPADM

## 2025-04-03 RX ORDER — NIFEDIPINE 30 MG/1
30 TABLET, EXTENDED RELEASE ORAL
Status: DISCONTINUED | OUTPATIENT
Start: 2025-04-03 | End: 2025-04-07

## 2025-04-03 RX ORDER — ONDANSETRON 2 MG/ML
4 INJECTION INTRAMUSCULAR; INTRAVENOUS ONCE AS NEEDED
Status: DISCONTINUED | OUTPATIENT
Start: 2025-04-03 | End: 2025-04-03

## 2025-04-03 RX ORDER — ALLOPURINOL 100 MG/1
100 TABLET ORAL DAILY
Status: DISCONTINUED | OUTPATIENT
Start: 2025-04-03 | End: 2025-04-11 | Stop reason: HOSPADM

## 2025-04-03 RX ORDER — DEXAMETHASONE SODIUM PHOSPHATE 4 MG/ML
8 INJECTION, SOLUTION INTRA-ARTICULAR; INTRALESIONAL; INTRAMUSCULAR; INTRAVENOUS; SOFT TISSUE ONCE AS NEEDED
Status: DISCONTINUED | OUTPATIENT
Start: 2025-04-03 | End: 2025-04-03

## 2025-04-03 RX ORDER — HYDROMORPHONE HCL IN WATER/PF 6 MG/30 ML
0.2 PATIENT CONTROLLED ANALGESIA SYRINGE INTRAVENOUS EVERY 2 HOUR PRN
Refills: 0 | Status: DISCONTINUED | OUTPATIENT
Start: 2025-04-03 | End: 2025-04-06

## 2025-04-03 RX ORDER — CALCIUM GLUCONATE 20 MG/ML
2 INJECTION, SOLUTION INTRAVENOUS ONCE
Status: COMPLETED | OUTPATIENT
Start: 2025-04-03 | End: 2025-04-03

## 2025-04-03 RX ORDER — OXYCODONE HYDROCHLORIDE 5 MG/1
5 TABLET ORAL EVERY 4 HOURS PRN
Refills: 0 | Status: DISCONTINUED | OUTPATIENT
Start: 2025-04-03 | End: 2025-04-05

## 2025-04-03 RX ADMIN — CHLORHEXIDINE GLUCONATE 0.12% ORAL RINSE 15 ML: 1.2 LIQUID ORAL at 08:36

## 2025-04-03 RX ADMIN — SODIUM CHLORIDE: 0.9 INJECTION, SOLUTION INTRAVENOUS at 00:00

## 2025-04-03 RX ADMIN — SEVELAMER HYDROCHLORIDE 1600 MG: 800 TABLET ORAL at 15:09

## 2025-04-03 RX ADMIN — ACETAMINOPHEN 975 MG: 325 TABLET, FILM COATED ORAL at 13:40

## 2025-04-03 RX ADMIN — ACETAMINOPHEN 975 MG: 325 TABLET, FILM COATED ORAL at 21:14

## 2025-04-03 RX ADMIN — CALCIUM GLUCONATE 2 G: 20 INJECTION, SOLUTION INTRAVENOUS at 08:36

## 2025-04-03 RX ADMIN — HYDROMORPHONE HYDROCHLORIDE 0.2 MG: 0.2 INJECTION, SOLUTION INTRAMUSCULAR; INTRAVENOUS; SUBCUTANEOUS at 15:11

## 2025-04-03 RX ADMIN — PHENYLEPHRINE HYDROCHLORIDE 100 MCG: 10 INJECTION INTRAVENOUS at 00:39

## 2025-04-03 RX ADMIN — SENNOSIDES AND DOCUSATE SODIUM 1 TABLET: 50; 8.6 TABLET ORAL at 21:14

## 2025-04-03 RX ADMIN — CHLORHEXIDINE GLUCONATE 0.12% ORAL RINSE 15 ML: 1.2 LIQUID ORAL at 21:14

## 2025-04-03 RX ADMIN — HYDROMORPHONE HYDROCHLORIDE 0.5 MG: 1 INJECTION, SOLUTION INTRAMUSCULAR; INTRAVENOUS; SUBCUTANEOUS at 02:33

## 2025-04-03 RX ADMIN — METHOCARBAMOL 500 MG: 500 TABLET ORAL at 13:40

## 2025-04-03 RX ADMIN — SODIUM CHLORIDE 50 ML/HR: 0.9 INJECTION, SOLUTION INTRAVENOUS at 03:45

## 2025-04-03 RX ADMIN — ALLOPURINOL 100 MG: 100 TABLET ORAL at 08:36

## 2025-04-03 RX ADMIN — FENTANYL CITRATE 50 MCG: 50 INJECTION INTRAMUSCULAR; INTRAVENOUS at 00:52

## 2025-04-03 RX ADMIN — SEVELAMER HYDROCHLORIDE 800 MG: 800 TABLET ORAL at 10:17

## 2025-04-03 RX ADMIN — HYDROMORPHONE HYDROCHLORIDE 0.5 MG: 1 INJECTION, SOLUTION INTRAMUSCULAR; INTRAVENOUS; SUBCUTANEOUS at 01:45

## 2025-04-03 RX ADMIN — OXYCODONE HYDROCHLORIDE 5 MG: 5 TABLET ORAL at 21:15

## 2025-04-03 RX ADMIN — IODIXANOL 60 ML: 320 INJECTION, SOLUTION INTRAVASCULAR at 01:03

## 2025-04-03 RX ADMIN — HYDROMORPHONE HYDROCHLORIDE 0.2 MG: 0.2 INJECTION, SOLUTION INTRAMUSCULAR; INTRAVENOUS; SUBCUTANEOUS at 19:36

## 2025-04-03 RX ADMIN — HYDROMORPHONE HYDROCHLORIDE 0.2 MG: 0.2 INJECTION, SOLUTION INTRAMUSCULAR; INTRAVENOUS; SUBCUTANEOUS at 23:42

## 2025-04-03 RX ADMIN — FENTANYL CITRATE 50 MCG: 50 INJECTION INTRAMUSCULAR; INTRAVENOUS at 01:09

## 2025-04-03 RX ADMIN — NOREPINEPHRINE BITARTRATE 3 MCG/MIN: 1 INJECTION, SOLUTION, CONCENTRATE INTRAVENOUS at 00:37

## 2025-04-03 RX ADMIN — HEPARIN SODIUM 5000 UNITS: 5000 INJECTION INTRAVENOUS; SUBCUTANEOUS at 21:14

## 2025-04-03 RX ADMIN — ATORVASTATIN CALCIUM 80 MG: 80 TABLET, FILM COATED ORAL at 15:08

## 2025-04-03 RX ADMIN — OXYCODONE HYDROCHLORIDE 5 MG: 5 TABLET ORAL at 15:07

## 2025-04-03 NOTE — CONSULTS
Consultation - Nephrology   Name: Naresh Carpio 64 y.o. male I MRN: [id number]  Unit/Bed#: [facility] 417-01 I Date of Admission: 4/3/2025   Date of Service: 4/3/2025 I Hospital Day: 0   Inpatient consult to Nephrology  Consult performed by: Marli Sanchez PA-C  Consult ordered by: Meaghan Laguna DO        Physician Requesting Evaluation: Niharika Souza DO   Reason for Evaluation / Principal Problem: ESRD on HD    Assessment & Plan  ESRD (end stage renal disease) on dialysis (HCC)  TTS at [facility] IN [place]  Access: Right IJ permcath  EDW: 99 kg per previous hospital records  Patient unsure and I am unable to locate outpatient records for further clarification  Patient currently unsure of when his last HD was due to confusion, but per critical care note, patient stated he missed his last dialysis session  Plan:  Plan for bedside HD this am  Low potassium diet, fluid restriction   Currently on NS 50 cc/hr- Recommend discontinuing IVF to avoid volume overload in a dialysis patient   Traumatic retroperitoneal hemorrhage  History of a fall onto a rail a few days ago. Continued to have progressive pain  Presented to [place] ED, found to have retroperitoneal hemorrhage with areas of active contrast extravasation. Transferred to [facility]  S/p IR embolization of branch of the deep circumflex iliac artery and superior gluteal artery overnight  Management per critical care    Primary hypertension  BP currently stable  Not on any antihypertensives currently  Home regimen: Nifedipine 30 mg daily   Anemia due to chronic kidney disease, on chronic dialysis (HCC)  Hgb currently 7.5  Not on SURINDER as outpatient per previous hospital notes  Unable to locate records to confirm  Continue to monitor and transfuse as necessary per primary team  At risk for electrolyte imbalance  K 5.3 this am- will place on low K diet, further management with HD  Sodium 133- volume management with HD  Continue to monitor   Chronic diastolic (congestive) heart failure  "([facility])  Wt Readings from Last 3 Encounters:   04/03/25 101 kg (221 lb 12.5 oz)   03/24/25 107 kg (234 lb 12.6 oz)   02/27/25 102 kg (224 lb 13.9 oz)   Echo Oct. 2024 with LVEF 60-65%  Monitor volume status   UF with HD  Chronic kidney disease-mineral and bone disorder  Home regimen: Renagel 800 mg TID per chart review  Can continue inpatient, phos this am 8.3  Paroxysmal atrial fibrillation (HCC)  Per critical care  Type 2 diabetes mellitus, without long-term current use of insulin (HCC)  Lab Results   Component Value Date    HGBA1C 5.7 (H) 02/09/2025   Per primary team    History of Present Illness   Naresh Carpio is a 64 y.o. male with a PMH of ESRD on HD, DM, A-fib, HTN, HLD, TIA who initially presented to [place] with shoulder/ back pain. Found to have large intraabdominal hematoma with active extravasation. Transferred to [facility] for urgent IR embolization of branch of the deep circumflex iliac artery and superior gluteal artery. Also with recent admission 3/24 - 3/29 after a fall resulting in L2 fracture. A renal consultation is requested today for assistance in the management of ESRD on HD. Patient seen and examined at bedside. Nursing staff in room who report patient is only oriented to self, previous documentation indicates he was fully oriented. At time of my exam, patient is alert responding to all questions, but does appear confused and is unsure of several answers, stating he \"does not know.\" Unable to tell me information about his most recent dialysis, access, etc. Nursing in communication with critical care AP to further assess. Patient does note ongoing abdominal pain, but denies any other overt complaints. No shortness of breath or chest pain. Is on oxygen with nasal cannula currently, reports he is not on oxygen at home. He does not appear to be in any acute distress.     Review of Systems   Reason unable to perform ROS: ROS limited due to patient being confused and answering \"I don't know\" to most " questions.   Respiratory:  Negative for chest tightness and shortness of breath.    Cardiovascular:  Negative for chest pain.   Gastrointestinal:  Positive for abdominal pain.     Medical History Review: I have reviewed the patient's PMH, PSH, Social History, Family History, Meds, and Allergies   Historical Information   Past Medical History:   Diagnosis Date    Acute cystitis 10/18/2024    Acute on chronic anemia 01/23/2022    Atrial fibrillation (HCC)     Bradycardia 09/05/2023    Diabetes mellitus (HCC)     ESRD (end stage renal disease) on dialysis (HCC)     Hematuria 10/10/2024    Hematuria 10/10/2024    Hyperkalemia 04/06/2024    Hyperkalemia 04/06/2024    Hyperlipidemia     Hypertension     Left toe amputee (HCC)     TIA (transient ischemic attack)     Toxic metabolic encephalopathy 10/08/2024     Past Surgical History:   Procedure Laterality Date    AMPUTATION Left     left foot resection 10 years ago dr tran    HEMODIALYSIS ADULT  1/18/2025    HEMODIALYSIS ADULT  2/27/2025    IR LOWER EXTREMITY ANGIOGRAM  08/29/2023    IR TEMPORARY DIALYSIS CATHETER PLACEMENT  08/25/2023    IR TUNNELED CENTRAL LINE REMOVAL  08/23/2023    IR TUNNELED DIALYSIS CATHETER PLACEMENT  01/27/2022    IR TUNNELED DIALYSIS CATHETER PLACEMENT  08/30/2023    [place] AMPUTATION METATARSAL W/TOE SINGLE Right 8/31/2023    Procedure: RIGHT PARTIAL 1ST RAY RESECTION WITH  WOUND VAC APPLICATION;  Surgeon: Won Parmar DPM;  Location: [facility];  Service: Podiatry     Social History     Tobacco Use    Smoking status: Never     Passive exposure: Never    Smokeless tobacco: Never   Vaping Use    Vaping status: Never Used   Substance and Sexual Activity    Alcohol use: Not Currently     Alcohol/week: 0.0 standard drinks of alcohol     Comment: 0    Drug use: Never    Sexual activity: Not on file     E-Cigarette/Vaping    E-Cigarette Use Never User      E-Cigarette/Vaping Substances    Nicotine No     THC No     CBD No     Flavoring No     Other  No     Unknown No        Social History     Tobacco Use    Smoking status: Never     Passive exposure: Never    Smokeless tobacco: Never   Vaping Use    Vaping status: Never Used   Substance and Sexual Activity    Alcohol use: Not Currently     Alcohol/week: 0.0 standard drinks of alcohol     Comment: 0    Drug use: Never    Sexual activity: Not on file       Current Facility-Administered Medications:     acetaminophen (TYLENOL) tablet 975 mg, Q8H KEMAL    allopurinol (ZYLOPRIM) tablet 100 mg, Daily    atorvastatin (LIPITOR) tablet 80 mg, Daily With Dinner    calcium gluconate 2 g in sodium chloride 0.9% 100 mL (premix), Once    chlorhexidine (PERIDEX) 0.12 % oral rinse 15 mL, Q12H KEMAL    gabapentin (NEURONTIN) capsule 100 mg, Once per day on Monday Wednesday Friday    lidocaine (LIDODERM) 5 % patch 1 patch, Daily PRN    methocarbamol (ROBAXIN) tablet 500 mg, Q6H PRN    ondansetron (ZOFRAN) injection 4 mg, Once PRN    senna-docusate sodium (SENOKOT S) 8.6-50 mg per tablet 1 tablet, HS    sodium chloride 0.9 % infusion, Continuous, Last Rate: 50 mL/hr (04/03/25 1960)  Prior to Admission Medications   Prescriptions Last Dose Informant Patient Reported? Taking?   NIFEdipine (PROCARDIA XL) 30 mg 24 hr tablet   No No   Sig: Take 1 tablet (30 mg total) by mouth daily after dinner   acetaminophen (TYLENOL) 325 mg tablet   No No   Sig: Take 3 tablets (975 mg total) by mouth every 8 (eight) hours   allopurinol (ZYLOPRIM) 100 mg tablet  Self No No   Sig: Take 1 tablet (100 mg total) by mouth daily   apixaban (Eliquis) 5 mg   No No   Sig: Take 1 tablet (5 mg total) by mouth 2 (two) times a day   aspirin 81 mg chewable tablet   No No   Sig: Chew 1 tablet (81 mg total) daily   atorvastatin (LIPITOR) 80 mg tablet   Yes No   Sig: Take 80 mg by mouth daily   gabapentin (NEURONTIN) 100 mg capsule   No No   Sig: Take 1 capsule (100 mg total) by mouth 3 (three) times a week   lidocaine (LIDODERM) 5 %   No No   Sig: Apply 1 patch  topically over 12 hours daily Remove & Discard patch within 12 hours or as directed by MD   methocarbamol (ROBAXIN) 500 mg tablet   No No   Sig: Take 1 tablet (500 mg total) by mouth every 6 (six) hours as needed for muscle spasms   sevelamer (RENAGEL) 800 mg tablet  Self No No   Sig: Take 1 tablet (800 mg total) by mouth 3 (three) times a day with meals      Facility-Administered Medications: None     Patient has no known allergies.    Objective :  Temp:  [96.8 °F (36 °C)-98.1 °F (36.7 °C)] 98.1 °F (36.7 °C)  HR:  [79-93] 79  BP: (109-166)/(51-72) 140/63  Resp:  [13-27] 22  SpO2:  [98 %-100 %] 100 %  O2 Device: Nasal cannula  Nasal Cannula O2 Flow Rate (L/min):  [2 L/min] 2 L/min    Current Weight: Weight - Scale: 101 kg (221 lb 12.5 oz)  First Weight: Weight - Scale: 101 kg (221 lb 12.5 oz)  I/O         04/01 0701  04/02 0700 04/02 0701  04/03 0700 04/03 0701  04/04 0700    P.O.  0     I.V. (mL/kg)  50 (0.5)     Total Intake(mL/kg)  50 (0.5)     Urine (mL/kg/hr)  0     Total Output  0     Net  +50                  Physical Exam  Vitals and nursing note reviewed.   Cardiovascular:      Rate and Rhythm: Normal rate and regular rhythm.   Pulmonary:      Effort: No respiratory distress.      Breath sounds: Rales (fine few, b/l bases) present.      Comments: Decreased inspiratory effort   Musculoskeletal:      Right lower leg: Edema present.      Left lower leg: Edema present.      Comments: LLE edema > right. Left lower extremity in immobilizer     Skin:     General: Skin is warm and dry.   Neurological:      General: No focal deficit present.      Mental Status: He is alert. He is disoriented.   Psychiatric:         Mood and Affect: Mood normal.       Medications:    Current Facility-Administered Medications:     acetaminophen (TYLENOL) tablet 975 mg, 975 mg, Oral, Q8H KEMAL, Meaghan Laguna DO    allopurinol (ZYLOPRIM) tablet 100 mg, 100 mg, Oral, Daily, Meaghan Laguna DO    atorvastatin (LIPITOR) tablet 80 mg, 80  "mg, Oral, Daily With Dinner, Meaghan Laguna DO    calcium gluconate 2 g in sodium chloride 0.9% 100 mL (premix), 2 g, Intravenous, Once, Nallely Hodge PA-C    chlorhexidine (PERIDEX) 0.12 % oral rinse 15 mL, 15 mL, Mouth/Throat, Q12H [facility], Annette Maria Palladino, DO    gabapentin (NEURONTIN) capsule 100 mg, 100 mg, Oral, Once per day on Monday Wednesday Friday, Meaghan Laguna DO    lidocaine (LIDODERM) 5 % patch 1 patch, 1 patch, Topical, Daily PRN, Meaghan Laguna DO    methocarbamol (ROBAXIN) tablet 500 mg, 500 mg, Oral, Q6H PRN, Meaghan Laguna DO    ondansetron (ZOFRAN) injection 4 mg, 4 mg, Intravenous, Once PRN, Marisa Caceres CRNA    senna-docusate sodium (SENOKOT S) 8.6-50 mg per tablet 1 tablet, 1 tablet, Oral, HS, Meaghan Laguna DO    sodium chloride 0.9 % infusion, 50 mL/hr, Intravenous, Continuous, Marisa Caceres, CRNA, Last Rate: 50 mL/hr at 04/03/25 0345, 50 mL/hr at 04/03/25 0345      Lab Results: I have reviewed the following results:  Results from last 7 days   Lab Units 04/03/25  0545 04/03/25  0148 04/02/25  2024 03/29/25  0628 03/28/25  0509   WBC Thousand/uL 8.74 9.65 9.24 7.97 8.68   HEMOGLOBIN g/dL 7.5* 6.5* 7.2* 9.1* 9.6*   HEMATOCRIT % 23.7* 20.7* 23.4* 29.3* 30.9*   PLATELETS Thousands/uL 264 267 340 230 239   POTASSIUM mmol/L 5.3 5.0 5.4* 5.4* 5.0   CHLORIDE mmol/L 94* 93* 91* 93* 93*   CO2 mmol/L 27 22 19* 18* 21   BUN mg/dL 63* 58* 55* 67* 49*   CREATININE mg/dL 7.00* 6.78* 6.60* 7.69* 6.26*   CALCIUM mg/dL 8.5 8.2* 9.1 8.2* 8.5   MAGNESIUM mg/dL 2.2 2.1  --   --   --    PHOSPHORUS mg/dL 8.3* 7.7*  --   --   --    ALBUMIN g/dL 3.5 3.4* 3.9  --   --        Administrative Statements   Portions of the record may have been created with voice recognition software. Occasional wrong word or \"sound a like\" substitutions may have occurred due to the inherent limitations of voice recognition software. Read the chart carefully and recognize, using context, where substitutions have occurred.If " you have any questions, please contact the dictating provider.

## 2025-04-03 NOTE — BRIEF OP NOTE (RAD/CATH)
INTERVENTIONAL RADIOLOGY PROCEDURE NOTE    Date: 4/3/2025    Procedure:   Procedure Summary       Date:  Room / Location:     Anesthesia Start:  Anesthesia Stop:     Procedure:  Diagnosis:     Scheduled Providers:  Responsible Provider:     Anesthesia Type: Not recorded ASA Status: Not recorded            Preoperative diagnosis:   1. Hematoma         Postoperative diagnosis: Same.    Surgeon: Anthony Garcia MD     Assistant: None. No qualified resident was available.    Blood loss: Minimal    Specimens: None    Findings: Focus of extravasation with supply from a branch of the deep circumflex iliac artery and superior gluteal artery successfully embolized using multiple coils.    Complications: None immediate.    Anesthesia: local and general

## 2025-04-03 NOTE — DISCHARGE INSTR - OTHER ORDERS
Skin Care Plan:  1-Left Medial Foot and Left Plantar Foot Wounds: cleanse with NSS and pat dry. Apply dermagran to plantar wound only. Cover both wounds with silicone bordered foam dressings. Naren with T for Treatment and change every other day or PRN  2-Turn/reposition q2h or when medically stable for pressure re-distribution on skin.  3-Elevate heels to offload pressure.  4-Moisturize skin daily with skin nourishing cream  5-Ehob cushion in chair when out of bed.  6-Preventative Hydraguard to bilateral sacro-buttocks and heels BID and PRN.   7-Paint Left Foot Great Toe and 2nd Toe Eschars with Betadine Daily.

## 2025-04-03 NOTE — HEMODIALYSIS
Post-Dialysis RN Treatment Note    Blood Pressure:  Pre 154/69 mm/Hg  Post 108/58 mmHg   EDW:  99 kg    Weight:  Pre 100.6 kg   Post 89.6 kg   Mode of weight measurement: Bed Scale   Volume Removed:  2000 ml    Treatment duration: 240 minutes    NS given:  No    Treatment shortened No   Medications given during Rx: None Reported   Estimated Kt/V:  None Reported   Access type: Permacath/TDC   Needle Gauge:  N/A   Access Issues: No    Report called to primary nurse:   Yes Betty MULLINS RN

## 2025-04-03 NOTE — PHYSICAL THERAPY NOTE
Physical Therapy Cancellation Note     04/03/25 1130   PT Last Visit   PT Visit Date 04/03/25   Note Type   Note type Cancelled Session   Cancel Reasons Patient off floor/hemodialysis       Gennaro Deleon PT, DPT

## 2025-04-03 NOTE — PLAN OF CARE
Problem: INFECTION - ADULT  Goal: Absence or prevention of progression during hospitalization  Description: INTERVENTIONS:- Assess and monitor for signs and symptoms of infection- Monitor lab/diagnostic results- Monitor all insertion sites, i.e. indwelling lines, tubes, and drains- Monitor endotracheal if appropriate and nasal secretions for changes in amount and color- Beltrami appropriate cooling/warming therapies per order- Administer medications as ordered- Instruct and encourage patient and family to use good hand hygiene technique- Identify and instruct in appropriate isolation precautions for identified infection/condition  Outcome: Progressing  Goal: Absence of fever/infection during neutropenic period  Description: INTERVENTIONS:- Monitor WBC  Outcome: Progressing

## 2025-04-03 NOTE — ASSESSMENT & PLAN NOTE
BP currently stable  Not on any antihypertensives currently  Home regimen: Nifedipine 30 mg daily

## 2025-04-03 NOTE — PROGRESS NOTES
Progress Note - Critical Care/ICU   Name: Naresh Carpio 64 y.o. male I MRN: [id number]  Unit/Bed#: [facility] 417-01 I Date of Admission: 4/3/2025   Date of Service: 4/3/2025 I Hospital Day: 0      TRAUMA TRANSFER FROM ICU AND TERTIARY SURVEY NOTE    VTE Prophylaxis:Heparin     Disposition: Trauma MS    Code status:  Level 1 - Full Code    Consultants: INPATIENT CONSULT TO IR  IP CONSULT TO CASE MANAGEMENT  IP CONSULT TO NEPHROLOGY    History of Present Illness   Mechanism of Injury:Fall  Summary of Diagnosed Injuries: Right retroperitoneal hematoma previously described now measuring 11.2 x 10.6 x 12.4 cm (previously measured 7.7 x 10.5 x 11.1 cm). Arterially enhancing foci internally which distribute throughout the hematoma volume on delayed phase images.    HPI/Last 24 hour events: 64 y.o. PMH ESRD noncompliant with dialysis TTS presented from the [facility] for a large retroperitoneal hematoma. S/p IR embo of the deep circumflex iliac artery and superior gluteal artery. Hemoglobin 6.5 transfused 2 pRBCs and DDAVP.    Reason for ICU admission: RP hematoma, ESRD    Summary of ICU clinical course:   - Patient remained hemodynamically stable. He was admitted to ICU and underwent HD via his R IJ Permacath. He had a repeat Hemoglobin immediately after dialysis which showed stability, 7.6. He was started on SQH for DVT ppx.    Recent or scheduled procedures: None     Outstanding/pending diagnostics: None     Objective :  Temp:  [96.8 °F (36 °C)-98.1 °F (36.7 °C)] 97.6 °F (36.4 °C)  HR:  [75-93] 87  BP: ()/(42-72) 108/58  Resp:  [12-27] 21  SpO2:  [96 %-100 %] 100 %  O2 Device: None (Room air)  Nasal Cannula O2 Flow Rate (L/min):  [2 L/min] 2 L/min    Physical Exam  Vitals and nursing note reviewed.   Constitutional:       General: He is not in acute distress.     Appearance: He is well-developed.   HENT:      Head: Normocephalic and atraumatic.   Eyes:      Conjunctiva/sclera: Conjunctivae normal.   Cardiovascular:       Rate and Rhythm: Normal rate and regular rhythm.      Heart sounds: No murmur heard.  Pulmonary:      Effort: Pulmonary effort is normal. No respiratory distress.      Breath sounds: Normal breath sounds.   Abdominal:      Palpations: Abdomen is soft.      Tenderness: There is no abdominal tenderness.   Musculoskeletal:         General: No swelling.      Cervical back: Neck supple.   Skin:     General: Skin is warm and dry.      Capillary Refill: Capillary refill takes less than 2 seconds.      Comments: Gluteal hematoma over b/l gluteus umesh     RIJ Permacath    Neurological:      Mental Status: He is alert.   Psychiatric:         Mood and Affect: Mood normal. Affect is flat.         Behavior: Behavior is withdrawn.            I have reviewed the following imaging results in PACS:  CTA A/P with/WO contrast- RP hematoma with area of active contrast extrav. Acute fracture of L2 vertebral body extending to the superior endplate. 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     Patient seen and evaluated by Critical Care today and deemed to be appropriate for transfer to Med Surg. Spoke to Peter BELTRAN PA-C from Trauma service regarding transfer. Critical Care can be contacted via SecureChat with any questions or concerns.

## 2025-04-03 NOTE — ASSESSMENT & PLAN NOTE
Wt Readings from Last 3 Encounters:   04/03/25 101 kg (221 lb 12.5 oz)   03/24/25 107 kg (234 lb 12.6 oz)   02/27/25 102 kg (224 lb 13.9 oz)   Echo Oct. 2024 with LVEF 60-65%  Monitor volume status   UF with HD

## 2025-04-03 NOTE — CONSULTS
e-Consult (IPC)  - Interventional Radiology  Naresh Carpio 64 y.o. male MRN: [id number]  Unit/Bed#:  Encounter: [id number]          Interventional Radiology has been consulted to evaluate Naresh Carpio    We were consulted by Dr. Palladino concerning this patient with active bleed.    Inpatient Consult to IR  Consult performed by: Anthony Garcia MD  Consult ordered by: Annette Maria Palladino, DO        04/02/25    Assessment/Recommendation:   65 yo male with recent admission for fall and L2 compression fracture presented to the ED with new right sided pain. A CTA shows active bleeding from the right posterior abdominal wall possible from a deep circumflex iliac artery branch. The scan was personally reviewed. The patient is on eliquis.    Will plan for emergent arteriogram and embolization.       Recent Labs     04/02/25 2024   WBC 9.24   HGB 7.2*   HCT 23.4*      INR 1.61*             11-20 minutes, >50% of the total time devoted to medical consultative verbal/EMR discussion between providers. Written report will be generated in the EMR.     Thank you for allowing Interventional Radiology to participate in the care of Naresh Carpio. Please don't hesitate to call or TigerText us with any questions.     Anthony Garcia MD

## 2025-04-03 NOTE — ASSESSMENT & PLAN NOTE
Hgb currently 7.5  Not on SURINDER as outpatient per previous hospital notes  Unable to locate records to confirm  Continue to monitor and transfuse as necessary per primary team

## 2025-04-03 NOTE — PLAN OF CARE
Started patient on hemodialysis treatment with UF goal of 2.5L net as tolerated per conversation with EB PA x 4 hours x 2K bath for serum k+ of 5.3 today 4/3/25.    Problem: METABOLIC, FLUID AND ELECTROLYTES - ADULT  Goal: Electrolytes maintained within normal limits  Description: INTERVENTIONS:- Monitor labs and assess patient for signs and symptoms of electrolyte imbalances- Administer electrolyte replacement as ordered- Monitor response to electrolyte replacements, including repeat lab results as appropriate- Instruct patient on fluid and nutrition as appropriate  Outcome: Progressing  Goal: Fluid balance maintained  Description: INTERVENTIONS:- Monitor labs - Monitor I/O and WT- Instruct patient on fluid and nutrition as appropriate- Assess for signs & symptoms of volume excess or deficit  Outcome: Progressing

## 2025-04-03 NOTE — ASSESSMENT & PLAN NOTE
K 5.3 this am- will place on low K diet, further management with HD  Sodium 133- volume management with HD  Continue to monitor

## 2025-04-03 NOTE — PROGRESS NOTES
NEPHROLOGY HEMODIALYSIS PROCEDURE NOTE    Seen and examined on hemodialysis.  Awake and alert.  Complaining of abdominal discomfort.  Overall slightly improved.  Denies any chest pain or shortness of breath.    QB: 350  QD: 500  Access:permcath  Dialyzer: 160  Projected Kt/V:-  Sodium: 138  Potassium: 2  Bicarbonate: 35  Ultrafiltration: 2.5 with estimated dry weight at 99 kg.  Medications given on HD: -    Physical Exam:    /56   Pulse 80   Temp 98 °F (36.7 °C) (Oral)   Resp 14   Wt 101 kg (221 lb 12.5 oz)   SpO2 100%   BMI 29.26 kg/m²     Ultrafiltrate 2/2.5 L with hemodialysis  Continue monitor hemoglobin closely  Discussed with dialysis nursing at bedside.

## 2025-04-03 NOTE — ASSESSMENT & PLAN NOTE
TTS at [facility] [place]  Access: Right IJ permcath  EDW: 99 kg per previous hospital records  Patient unsure and I am unable to locate outpatient records for further clarification  Patient currently unsure of when his last HD was due to confusion, but per critical care note, patient stated he missed his last dialysis session  Plan:  Plan for bedside HD this am  Low potassium diet, fluid restriction   Currently on NS 50 cc/hr- Recommend discontinuing IVF to avoid volume overload in a dialysis patient

## 2025-04-03 NOTE — ASSESSMENT & PLAN NOTE
History of a fall onto a rail a few days ago. Continued to have progressive pain  Presented to [facility] ED, found to have retroperitoneal hemorrhage with areas of active contrast extravasation. Transferred to [facility]  S/p IR embolization of branch of the deep circumflex iliac artery and superior gluteal artery overnight  Management per critical care

## 2025-04-03 NOTE — EMTALA/ACUTE CARE TRANSFER
[facility] EMERGENCY DEPARTMENT  [address] [address]  Dept: [phone]      EMTALA TRANSFER CONSENT    NAME Naresh Carpio                                         1960                              MRN [id number]    I have been informed of my rights regarding examination, treatment, and transfer   by Dr. Mercedes Feldman MD    Benefits: Specialized equipment and/or services available at the receiving facility (Include comment)________________________    Risks: Potential for delay in receiving treatment, Potential deterioration of medical condition, Loss of IV, Increased discomfort during transfer, Possible worsening of condition or death during transfer      Consent for Transfer:  I acknowledge that my medical condition has been evaluated and explained to me by the emergency department physician or other qualified medical person and/or my attending physician, who has recommended that I be transferred to the service of  Accepting Physician: Dr. Souza at Accepting Facility Name, City & State : [facility], [place]. The above potential benefits of such transfer, the potential risks associated with such transfer, and the probable risks of not being transferred have been explained to me, and I fully understand them.  The doctor has explained that, in my case, the benefits of transfer outweigh the risks.  I agree to be transferred.    I authorize the performance of emergency medical procedures and treatments upon me in both transit and upon arrival at the receiving facility.  Additionally, I authorize the release of any and all medical records to the receiving facility and request they be transported with me, if possible.  I understand that the safest mode of transportation during a medical emergency is an ambulance and that the Hospital advocates the use of this mode of transport. Risks of traveling to the receiving facility by car, including absence of medical control, life  sustaining equipment, such as oxygen, and medical personnel has been explained to me and I fully understand them.    (GIANNI CORRECT BOX BELOW)  [  ]  I consent to the stated transfer and to be transported by ambulance/helicopter.  [  ]  I consent to the stated transfer, but refuse transportation by ambulance and accept full responsibility for my transportation by car.  I understand the risks of non-ambulance transfers and I exonerate the Hospital and its staff from any deterioration in my condition that results from this refusal.    X___________________________________________    DATE  25  TIME________  Signature of patient or legally responsible individual signing on patient behalf           RELATIONSHIP TO PATIENT_________________________          Provider Certification    NAME Naresh Carpio                                         1960                              MRN [id number]    A medical screening exam was performed on the above named patient.  Based on the examination:    Condition Necessitating Transfer The primary encounter diagnosis was Retroperitoneal hematoma. Diagnoses of Coagulopathy (HCC), Anemia, and Active internal bleeding were also pertinent to this visit.    Patient Condition: The patient has been stabilized such that within reasonable medical probability, no material deterioration of the patient condition or the condition of the unborn child(jeffry) is likely to result from the transfer    Reason for Transfer: Level of Care needed not available at this facility    Transfer Requirements: Facility [place], [place]   Space available and qualified personnel available for treatment as acknowledged by    Lana to accept transfer and to provide appropriate medical treatment as acknowledged by       Dr. Souza  Appropriate medical records of the examination and treatment of the patient are provided at the time of transfer   STAFF INITIAL WHEN COMPLETED _______  Transfer will be performed  by qualified personnel from    and appropriate transfer equipment as required, including the use of necessary and appropriate life support measures.    Provider Certification: I have examined the patient and explained the following risks and benefits of being transferred/refusing transfer to the patient/family:  General risk, such as traffic hazards, adverse weather conditions, rough terrain or turbulence, possible failure of equipment (including vehicle or aircraft), or consequences of actions of persons outside the control of the transport personnel, Unanticipated needs of medical equipment and personnel during transport, Risk of worsening condition, The possibility of a transport vehicle being unavailable      Based on these reasonable risks and benefits to the patient and/or the unborn child(jeffry), and based upon the information available at the time of the patient’s examination, I certify that the medical benefits reasonably to be expected from the provision of appropriate medical treatments at another medical facility outweigh the increasing risks, if any, to the individual’s medical condition, and in the case of labor to the unborn child, from effecting the transfer.    X____________________________________________ DATE 04/02/25        TIME_______      ORIGINAL - SEND TO MEDICAL RECORDS   COPY - SEND WITH PATIENT DURING TRANSFER

## 2025-04-03 NOTE — H&P
H&P - Critical Care/ICU   Name: Naresh Carpio 64 y.o. male I MRN: [id number]  Unit/Bed#: [facility] 417-01 I Date of Admission: 4/3/2025   Date of Service: 4/3/2025 I Hospital Day: 0       Assessment & Plan   Active Hospital Problems    Diagnosis Date Noted POA    Traumatic retroperitoneal hemorrhage 04/03/2025 Yes    Anemia due to chronic kidney disease, on chronic dialysis (HCC) 01/21/2022 Not Applicable    Secondary hyperparathyroidism (HCC) 04/03/2025 Unknown    At risk for electrolyte imbalance 04/03/2025 Unknown    ESRD (end stage renal disease) on dialysis (HCC) 01/16/2025 Not Applicable    Primary hypertension 01/16/2025 Yes    Chronic diastolic (congestive) heart failure (HCC) 11/19/2024 Yes    Paroxysmal atrial fibrillation ([facility])  Yes    Chronic kidney disease-mineral and bone disorder 10/14/2024 Yes    Type 2 diabetes mellitus, without long-term current use of insulin ([facility])  Yes      Resolved Hospital Problems   No resolved problems to display.       Neuro/Psych:  Plan:  Neuro checks q4H  Sedation: none  Analgesia: Multimodal. Home pain regimen ordered: tylenol 975mg q8H, robaxin 500mg q6H PRN, lidoderm patch  PRN dilaudid for breakthrough pain    CV:  Diagnoses: HTN, CHF, afib  Most recent ECHO: 10/24 LVEF 60 to 65%, mildly abnormal diastolic function. Concentric hypertrophy  Plan:  eliquis 5mg BID, aspirin 81 mg held  EKG ordered  Lipitor and procardia xl 30mg ordered  Continuous cardiopulmonary monitoring. Maintain MAP >65.    Pulm:  Diagnoses: No active issues  Continuous pulse oximetry. Maintain O2 sat >92%.     GI:  Diagnoses: Increase in size of right retroperitoneal hematoma with areas of active contrast extravasation   Plan:  S/p IR embolization of branch of the deep circumflex iliac artery and superior gluteal artery   IR access site checks  Bowel regimen: senokot      :  Diagnoses: ESRD on dialysis TTS  Initial presenting K 5.4  Last dialysis 3/29  Does still make urine  Plan:  Nephrology  consulted for dialysis needs  Plan for dialysis this morning  Right IJ permcath access  Patient declines fistula  DDAVP for uremia given  Sevelamer TID 800mg  Monitor I/Os.    F/E/N:  Plan:   F:  Fluid resuscitation prn.  E: Monitor and replete electrolytes for Mg >2, Phos >3, K >4.  N: regular house with 1800ml fluid restrictions, 2g K, 2g Na     Heme/Onc:  Diagnoses: Anemia secondary to ESRD and bleed  Baseline 9  Hemoglobin 7.5 this am  Plan:  Received 2 pRBCs  Repeat H/H at noon  VTE prophylaxis: hold until repeat hemoglobin at noon    Endo:  Diagnoses: type 2 DM not on medications  2/2025 A1c: 5.7  Plan:   Consider SSI if hyperglycemic  BID glucose checks  Monitor blood glucose.    ID:  Diagnoses: No active issues  Culture data: none  Labs: WBC 9.65  Temperature: afebrile  Plan:  Monitor fever curve and WBC.    MSK/Skin:  Diagnoses: Prior L2 fracture  Plan:  On home pain control listed in Neuro section  PT/OT when appropriate. Encourage OOB and ambulation when appropriate. Local wound care prn.    Diagnoses: bilateral foot wounds  Podiatry follows outpatient  Plan:  Wound care consulted  Continue to monitor for signs of developing infection    Diagnoses: Gout  Plan:  Allopurinol 100mg daily  Monitor for acute gout flare ups    LDAs:  Lines - pIVs, R IJ permcath  Drains - none  Airways -  none    Disposition: downgrade after dialysis and hemoglobin recheck if stable    History of Present Illness   Naresh Carpio is a 64 y.o. who presents [facility] the [facility] for a large retroperitoneal hematoma.  He presented after falling a few days ago onto a rail but had progressive pain.  He has missed his last dialysis session.  He complains of back pain.  He was recently admitted to the hospital after a fall where he was diagnosed with an L2 fracture. Patient states he does not have preceding symptoms to his multiple recent falls including dizziness, chest pain, lightheadedness. His falls are described as mechanical. He  sometimes ambulates with a walker but not consistently.     History obtained from chart review and the patient.  Review of Systems: See HPI for Review of Systems    Historical Information   Past Medical History:  10/18/2024: Acute cystitis  01/23/2022: Acute on chronic anemia  No date: Atrial fibrillation (HCC)  09/05/2023: Bradycardia  No date: Diabetes mellitus (HCC)  No date: ESRD (end stage renal disease) on dialysis (HCC)  10/10/2024: Hematuria  10/10/2024: Hematuria  04/06/2024: Hyperkalemia  04/06/2024: Hyperkalemia  No date: Hyperlipidemia  No date: Hypertension  No date: Left toe amputee (HCC)  No date: TIA (transient ischemic attack)  10/08/2024: Toxic metabolic encephalopathy Past Surgical History:  No date: AMPUTATION; Left      Comment:  left foot resection 10 years ago dr tran  1/18/2025: HEMODIALYSIS ADULT  2/27/2025: HEMODIALYSIS ADULT  08/29/2023: IR LOWER EXTREMITY ANGIOGRAM  08/25/2023: IR TEMPORARY DIALYSIS CATHETER PLACEMENT  08/23/2023: IR TUNNELED CENTRAL LINE REMOVAL  01/27/2022: IR TUNNELED DIALYSIS CATHETER PLACEMENT  08/30/2023: IR TUNNELED DIALYSIS CATHETER PLACEMENT  8/31/2023: OR AMPUTATION METATARSAL W/TOE SINGLE; Right      Comment:  Procedure: RIGHT PARTIAL 1ST RAY RESECTION WITH  WOUND                VAC APPLICATION;  Surgeon: Won Parmra DPM;  Location:               [facility];  Service: Podiatry   Current Outpatient Medications   Medication Instructions    acetaminophen (TYLENOL) 975 mg, Oral, Every 8 hours scheduled    allopurinol (ZYLOPRIM) 100 mg, Oral, Daily    apixaban (ELIQUIS) 5 mg, Oral, 2 times daily    aspirin 81 mg, Oral, Daily    atorvastatin (LIPITOR) 80 mg, Daily    gabapentin (NEURONTIN) 100 mg, Oral, 3 times weekly    lidocaine (LIDODERM) 5 % 1 patch, Topical, Daily, Remove & Discard patch within 12 hours or as directed by MD    methocarbamol (ROBAXIN) 500 mg, Oral, Every 6 hours PRN    NIFEdipine (PROCARDIA XL) 30 mg, Oral, Daily after dinner    sevelamer  (RENAGEL) 800 mg, Oral, 3 times daily with meals    No Known Allergies   Social History     Tobacco Use    Smoking status: Never     Passive exposure: Never    Smokeless tobacco: Never   Vaping Use    Vaping status: Never Used   Substance Use Topics    Alcohol use: Not Currently     Alcohol/week: 0.0 standard drinks of alcohol     Comment: 0    Drug use: Never    No family history on file.       Objective :                   Vitals I/O      Most Recent Min/Max in 24hrs   Temp 98.1 °F (36.7 °C) Temp  Min: 96.8 °F (36 °C)  Max: 98.1 °F (36.7 °C)   Pulse 77 Pulse  Min: 77  Max: 93   Resp 19 Resp  Min: 13  Max: 27   /60 BP  Min: 109/57  Max: 166/70   O2 Sat 98 % SpO2  Min: 98 %  Max: 100 %      Intake/Output Summary (Last 24 hours) at 4/3/2025 1008  Last data filed at 4/3/2025 0800  Gross per 24 hour   Intake 262.5 ml   Output 0 ml   Net 262.5 ml       Diet Regular; Regular House; Fluid Restriction 1800 ML, Potassium 2 GM, Sodium 2 GM    Invasive Monitoring           Physical Exam   Physical Exam  Vitals reviewed.   Eyes:      General: Lids are normal.      Extraocular Movements: Extraocular movements intact.      Conjunctiva/sclera: Conjunctivae normal.      Pupils: Pupils are equal, round, and reactive to light.   Feet:      Comments: Multiple wounds bilateral, 1+ DP pulses  Skin:     General: Skin is warm.      Capillary Refill: Capillary refill takes less than 2 seconds.   HENT:      Head: Normocephalic and atraumatic.      Mouth/Throat:      Mouth: Mucous membranes are moist.   Cardiovascular:      Rate and Rhythm: Normal rate and regular rhythm.      Heart sounds: Normal heart sounds.   Musculoskeletal:      Cervical back: Neck supple.      Comments: Leg immobilizer on left leg, mild pitting edema of lower extremities; large ecchymosis on left buttock and hip   Abdominal: General: Abdomen is flat.      Palpations: Abdomen is soft.      Tenderness: There is no abdominal tenderness. There is no guarding or  rebound.      Comments: L femoral access without swelling, hematoma, tenderness   Constitutional:       General: He is not in acute distress.     Appearance: He is overweight. He is ill-appearing.      Interventions: Nasal cannula in place.   Pulmonary:      Effort: Pulmonary effort is normal.      Breath sounds: Normal breath sounds.   Psychiatric:         Mood and Affect: Mood normal.         Speech: Speech normal.         Behavior: Behavior normal.   Neurological:      Mental Status: He is alert and oriented to person, place, and time.      Cranial Nerves: Cranial nerves 2-12 are intact.      Sensory: Sensation is intact.      Coordination: Coordination is intact.          Diagnostic Studies        Lab Results: I have reviewed the following results:     Medications:  Scheduled PRN   acetaminophen, 975 mg, Q8H KEMAL  allopurinol, 100 mg, Daily  atorvastatin, 80 mg, Daily With Dinner  chlorhexidine, 15 mL, Q12H KEMAL  gabapentin, 100 mg, Once per day on Monday Wednesday Friday  NIFEdipine, 30 mg, After Dinner  senna-docusate sodium, 1 tablet, HS  sevelamer, 800 mg, TID With Meals      lidocaine, 1 patch, Daily PRN  methocarbamol, 500 mg, Q6H PRN  ondansetron, 4 mg, Once PRN       Continuous            Labs:   CBC    Recent Labs     04/03/25 0148 04/03/25  0545   WBC 9.65 8.74   HGB 6.5* 7.5*   HCT 20.7* 23.7*    264     BMP    Recent Labs     04/03/25 0148 04/03/25  0545   SODIUM 132* 133*   K 5.0 5.3   CL 93* 94*   CO2 22 27   AGAP 17* 12   BUN 58* 63*   CREATININE 6.78* 7.00*   CALCIUM 8.2* 8.5       Coags    Recent Labs     04/02/25 2024 04/03/25  0148   INR 1.61* 1.51*   PTT 35* 29        Additional Electrolytes  Recent Labs     04/03/25 0148 04/03/25  0545   MG 2.1 2.2   PHOS 7.7* 8.3*   CAIONIZED 1.05*  --           Blood Gas    No recent results  Recent Labs     04/03/25 0148   PHVEN 7.347   ATC4GIF 41.2*   PO2VEN 60.5*   QFE0RJH 22.1*   BEVEN -3.3   P1ZLZPE 84.2*    LFTs  Recent Labs      04/03/25  0148 04/03/25  0545   ALT 7 7   AST 10* 14   ALKPHOS 62 59   ALB 3.4* 3.5   TBILI 1.12* 1.14*       Infectious  No recent results  Glucose  Recent Labs     04/02/25 2024 04/03/25  0148 04/03/25  0545   GLUC 139 154* 140

## 2025-04-03 NOTE — WOUND OSTOMY CARE
Consult Note - Wound   Naresh Carpio 64 y.o. male MRN: [id number]  Unit/Bed#: [facility] 417-01 Encounter: [id number]        History and Present Illness:  Patient is a 65 yo male that was admitted to [facility] for treatment of traumatic retroperitoneal hemorrhage.  Patient has a PMH of DM, HLD, HTN. Patient is a mod assist for turning and repositioning. Patient reports continence of bowel and bladder. On assessment, patient is seen lying on critical care low air loss mattress.    Wound Care was consulted for b/l foot wounds     Assessment Findings:   B/L heels are dry intact and howie with no skin loss or wounds present. Recommend preventative Hydraguard Cream and proper offloading/ repositioning.      Patient declined assessment of sacro-buttocks - he reports no wounds, skin loss or pain to the area. Preventative ordered placed. Primary RN made aware of patient refusal.     Right Medial Foot: intact, dry well adhered scab. No skin loss or drainage noted. Recommend leaving GERMAINE.   Left Medial Distal Foot Wound: likely diabetic in etiology. Irregular in shape with partial thickness skin loss. Wound bed with pink fragile tissue. Liliana-wound is fragile. Scant serosanguineous drainage noted. Recommend application of a foam dressing to the wound  Left Plantar Foot Wound: likely diabetic in etiology. Circular in shape area of partial thickness skin loss. Wound bed with dry red tissue. Liliana-wound is fragile and calloused. No drainage noted. Recommend dermagran and foam dressing.   Right Anterior Pretibial Leg:  intact, dry well adhered scab. No skin loss or drainage noted. Recommend leaving GERMAINE.    Left Foot Great Toe and 2nd Toe Eschars:  2 areas of intact black brown eschar measured together. No skin loss or drainage noted. Recommend betadine.     No induration, fluctuance, odor, warmth/temperature differences, redness, or purulence noted to the above noted wounds and skin areas assessed. New dressings applied per orders  listed below. Patient tolerated well- no s/s of non-verbal pain or discomfort observed during the encounter. Bedside nurse aware of plan of care. See flow sheets for more detailed assessment findings.      Orders listed below and wound care will continue to follow, call or Secure Chat Message with questions.     Skin Care Plan:  1-Left Medial Foot and Left Plantar Foot Wounds: cleanse with NSS and pat dry. Apply dermagran to plantar wound only. Cover both wounds with silicone bordered foam dressings. Naren with T for Treatment and change every other day or PRN  2-Turn/reposition q2h or when medically stable for pressure re-distribution on skin.  3-Elevate heels to offload pressure.  4-Moisturize skin daily with skin nourishing cream  5-Ehob cushion in chair when out of bed.  6-Preventative Hydraguard to bilateral sacro-buttocks and heels BID and PRN.   7-Paint Left Foot Great Toe and 2nd Toe Eschars with Betadine Daily.       Wounds:  Wound 03/24/25 Diabetic Ulcer Foot Right;Medial (Active)   Wound Image   04/03/25 1048   Wound Description Intact;Dry 04/03/25 1400   Wound Length (cm) 0 cm 04/03/25 1400   Wound Width (cm) 0 cm 04/03/25 1400   Wound Depth (cm) 0 cm 04/03/25 1400   Wound Surface Area (cm^2) 0 cm^2 04/03/25 1400   Wound Volume (cm^3) 0 cm^3 04/03/25 1400   Calculated Wound Volume (cm^3) 0 cm^3 04/03/25 1400   Change in Wound Size % 100 04/03/25 1400       Wound 03/24/25 Diabetic Ulcer Foot Left;Medial (Active)   Wound Image   04/03/25 1050   Wound Description Pink;Fragile 04/03/25 1400   Non-staged Wound Description Partial thickness 04/03/25 1400   Wound Length (cm) 1.5 cm 04/03/25 1050   Wound Width (cm) 0.8 cm 04/03/25 1050   Wound Depth (cm) 0.1 cm 04/03/25 1050   Wound Surface Area (cm^2) 1.2 cm^2 04/03/25 1050   Wound Volume (cm^3) 0.12 cm^3 04/03/25 1050   Calculated Wound Volume (cm^3) 0.12 cm^3 04/03/25 1050   Change in Wound Size % 20 04/03/25 1050   Drainage Amount Scant 04/03/25 1400    Drainage Description Serosanguineous 04/03/25 1400   Liliana-wound Assessment Fragile 04/03/25 1400   Treatments Cleansed;Site care 04/03/25 1400   Dressing Foam, Silicon (eg. Allevyn, etc) 04/03/25 1400   Dressing Changed Changed 04/03/25 1400   Patient Tolerance Tolerated well 04/03/25 1400   Dressing Status Clean;Dry;Intact 04/03/25 1400       Wound 03/26/25 Diabetic Ulcer Foot Left;Plantar (Active)   Wound Image   04/03/25 1050   Wound Description Pink 04/03/25 1400   Non-staged Wound Description Partial thickness 04/03/25 1400   Wound Length (cm) 0.5 cm 04/03/25 1050   Wound Width (cm) 0.5 cm 04/03/25 1050   Wound Depth (cm) 0.1 cm 04/03/25 1400   Wound Surface Area (cm^2) 0.25 cm^2 04/03/25 1050   Drainage Amount None 04/03/25 1400   Liliana-wound Assessment Fragile;Callus 04/03/25 1400   Treatments Cleansed;Site care;Irrigation with NSS 04/03/25 1400   Dressing Dermagran gauze;Foam, Silicon (eg. Allevyn, etc) 04/03/25 1400   Dressing Changed New 04/03/25 1400   Patient Tolerance Tolerated well 04/03/25 1400   Dressing Status Clean;Dry;Intact 04/03/25 1400       Wound 03/26/25 Diabetic Ulcer Toe D2, second Anterior;Left (Active)   Wound Image   04/03/25 1049   Wound Description Intact;Dry;Black;Eschar 04/03/25 1400   Non-staged Wound Description Not applicable 04/03/25 1400   Wound Length (cm) 1 cm 04/03/25 1049   Wound Width (cm) 6 cm 04/03/25 1049   Wound Depth (cm) 0 cm 04/03/25 1049   Wound Surface Area (cm^2) 6 cm^2 04/03/25 1049   Wound Volume (cm^3) 0 cm^3 04/03/25 1049   Calculated Wound Volume (cm^3) 0 cm^3 04/03/25 1049   Change in Wound Size % 100 04/03/25 1049   Drainage Amount None 04/03/25 1400   Treatments Cleansed;Site care 04/03/25 1400   Dressing Other (Comment) 04/03/25 1400       Wound 03/26/25 Traumatic Leg Right;Anterior (Active)   Wound Image   04/03/25 1048   Wound Description Intact 04/03/25 1400   Wound Length (cm) 0 cm 04/03/25 1400   Wound Width (cm) 0 cm 04/03/25 1400   Wound Depth  (cm) 0 cm 04/03/25 1400   Wound Surface Area (cm^2) 0 cm^2 04/03/25 1400   Wound Volume (cm^3) 0 cm^3 04/03/25 1400   Calculated Wound Volume (cm^3) 0 cm^3 04/03/25 1400   Change in Wound Size % 100 04/03/25 1400               Xochitl Corbin RN, BSN, CWOCN

## 2025-04-03 NOTE — PROGRESS NOTES
Progress Note -Interventional Radiology  Naresh Carpio 64 y.o. male MRN: [id number]  Unit/Bed#: [facility] 417-01 Encounter: [id number]    Assessment/Plan:  63 yo male with ESRD on HD, HTN, DM type 2, Afib on Eliquis with recent admission for fall with L2 compression fracture who initially presented to [place] ED on 4/2/25 with right sided pain, found to have large right retroperitoneal hematoma with active extravasation on CT imaging and Hgb drop 9-->7. He received 1 u PRBC and Kcentra and was transferred to [facility] for emergent IR intervention.     Patient now s/p IR embolization of deep circumflex iliac artery branch and superior gluteal artery on 4/3/25    Patient seen in follow up today.  -Hemodynamically stable, normotensive and afebrile. SpO2 100% on RA.    Patient with mild abdominal pain and LLE at this time  -Hgb 9-->today 7.5-->7.6  -monitoring and management per Critical Care team  -Nephrology following and patient currently receiving HD at bedside.   -Left groin access site:  -No evidence of ecchymosis, edema or hematoma.   -B/L LE warm with DP and PT pulses present by doppler signal.   -please reach out to IR with questions or concerns  -remainder of care per Critical Care team      Medical Problems       Problem List       * (Principal) Traumatic retroperitoneal hemorrhage    Anemia due to chronic kidney disease, on chronic dialysis (HCC)    Lab Results   Component Value Date    EGFR 7 04/03/2025    EGFR 7 04/03/2025    EGFR 8 04/02/2025    CREATININE 7.00 (H) 04/03/2025    CREATININE 6.78 (H) 04/03/2025    CREATININE 6.60 (H) 04/02/2025         Type 2 diabetes mellitus, without long-term current use of insulin (HCC)    Lab Results   Component Value Date    HGBA1C 5.7 (H) 02/09/2025       Recent Labs     04/03/25  0126 04/03/25  0734   POCGLU 137 109       Blood Sugar Average: Last 72 hrs:  (P) 123        Hypertension    History of TIA (transient ischemic attack)    T10 vertebral fracture (HCC)    Diarrhea     Hypervolemia    Hemodialysis status ([facility])    Left hip pain    Severe Left Orchalgia    Right shoulder pain    Osteoarthritis of left hip    Status post fall    Traumatic rhabdomyolysis ([facility])    Open wound of right foot    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 mellitus, with bone involvement without evidence of necrosis ([facility])    Lab Results   Component Value Date    HGBA1C 5.7 (H) 02/09/2025       Recent Labs     04/03/25  0126 04/03/25  0734   POCGLU 137 109       Blood Sugar Average: Last 72 hrs:  (P) 123        Diabetic ulcer of left midfoot associated with type 2 diabetes mellitus, limited to breakdown of skin ([facility])    Lab Results   Component Value Date    HGBA1C 5.7 (H) 02/09/2025       Recent Labs     04/03/25  0126 04/03/25  0734   POCGLU 137 109       Blood Sugar Average: Last 72 hrs:  (P) 123        Diabetic polyneuropathy associated with type 2 diabetes mellitus ([facility])    Lab Results   Component Value Date    HGBA1C 5.7 (H) 02/09/2025       Recent Labs     04/03/25  0126 04/03/25  0734   POCGLU 137 109       Blood Sugar Average: Last 72 hrs:  (P) 123        Diabetes mellitus type 2 with peripheral artery disease ([facility])    Lab Results   Component Value Date    HGBA1C 5.7 (H) 02/09/2025       Recent Labs     04/03/25  0126 04/03/25  0734   POCGLU 137 109       Blood Sugar Average: Last 72 hrs:  (P) 123        History of amputation of lesser toe of left foot ([facility])    Onychomycosis    Subacute osteomyelitis of right foot ([facility])    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 mellitus, with necrosis of bone ([facility])    Lab Results   Component Value Date    HGBA1C 5.7 (H) 02/09/2025       Recent Labs     04/03/25  0126 04/03/25  0734   POCGLU 137 109       Blood Sugar Average: Last 72 hrs:  (P) 123        Acquired deformity of foot, right    Charcot's joint    Polymicrobial bacterial infection    Cellulitis of right foot    Constipation    Facial cellulitis    History of  amputation of hallux ([facility])    Difficulty with speech    Hyperkalemia    Closed fracture of right pelvis ([facility])    Bilateral sacral fracture, closed ([facility])    Falls    PAD (peripheral artery disease) ([facility])    Chronic kidney disease-mineral and bone disorder    Lab Results   Component Value Date    EGFR 7 04/03/2025    EGFR 7 04/03/2025    EGFR 8 04/02/2025    CREATININE 7.00 (H) 04/03/2025    CREATININE 6.78 (H) 04/03/2025    CREATININE 6.60 (H) 04/02/2025         Hyponatremia    Nausea    ESRD (end stage renal disease) ([facility])    Lab Results   Component Value Date    EGFR 7 04/03/2025    EGFR 7 04/03/2025    EGFR 8 04/02/2025    CREATININE 7.00 (H) 04/03/2025    CREATININE 6.78 (H) 04/03/2025    CREATININE 6.60 (H) 04/02/2025         Paroxysmal atrial fibrillation ([facility])    Fall    Melena    Chronic diastolic (congestive) heart failure ([facility])    Wt Readings from Last 3 Encounters:   04/03/25 101 kg (221 lb 12.5 oz)   03/24/25 107 kg (234 lb 12.6 oz)   02/27/25 102 kg (224 lb 13.9 oz)                 Pleural effusion    Elevated troponin    Chronic wound    Overview Signed 11/20/2024  4:39 PM by Milan Ruiz MD   Patient with a wound on the sole of the right foot. Podiatry consulted.         Renal lesion    ESRD (end stage renal disease) on dialysis ([facility])    Lab Results   Component Value Date    EGFR 7 04/03/2025    EGFR 7 04/03/2025    EGFR 8 04/02/2025    CREATININE 7.00 (H) 04/03/2025    CREATININE 6.78 (H) 04/03/2025    CREATININE 6.60 (H) 04/02/2025         Primary hypertension    Anemia in ESRD (end-stage renal disease)  ([facility])    Lab Results   Component Value Date    EGFR 7 04/03/2025    EGFR 7 04/03/2025    EGFR 8 04/02/2025    CREATININE 7.00 (H) 04/03/2025    CREATININE 6.78 (H) 04/03/2025    CREATININE 6.60 (H) 04/02/2025         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, on chronic dialysis ([facility])    Lab Results   Component Value Date    EGFR 7 04/03/2025     EGFR 7 04/03/2025    EGFR 8 04/02/2025    CREATININE 7.00 (H) 04/03/2025    CREATININE 6.78 (H) 04/03/2025    CREATININE 6.60 (H) 04/02/2025         Left shoulder pain    Closed fracture of proximal end of left humerus with routine healing    Gout    Overview Deleted 2/18/2025 12:06 PM by Sabina Mir          Generalized weakness    Disorder of acid-base balance    Closed fracture of second lumbar vertebra (HCC)    Non-compliance with treatment    Multiple open wounds of foot    Secondary hyperparathyroidism (HCC)    At risk for electrolyte imbalance             Subjective: Patient seen at bedside in CCU. He is currently receiving Hemodialysis. He states he feels ok. He does endorse some mild abdominal pain, and continues with LLE pain especially with  movement. He otherwise has no new complaints at this time.     Objective:    Vitals:  /66 (BP Location: Right arm)   Pulse 80   Temp 98.1 °F (36.7 °C) (Oral)   Resp 18   Wt 101 kg (221 lb 12.5 oz)   SpO2 99%   BMI 29.26 kg/m²   Body mass index is 29.26 kg/m².  Weight (last 2 days)       Date/Time Weight    04/03/25 0545 101 (221.78)            I/Os:    Intake/Output Summary (Last 24 hours) at 4/3/2025 0909  Last data filed at 4/3/2025 0700  Gross per 24 hour   Intake 50 ml   Output 0 ml   Net 50 ml       Invasive Devices       Peripheral Intravenous Line  Duration             Peripheral IV 04/02/25 Dorsal (posterior);Right Wrist <1 day    Peripheral IV 04/02/25 Right Antecubital <1 day              Hemodialysis Catheter  Duration             HD Permanent Double Catheter 582 days                    Physical Exam  Constitutional:       General: He is not in acute distress.  Eyes:      Conjunctiva/sclera: Conjunctivae normal.   Cardiovascular:      Rate and Rhythm: Normal rate.      Comments: Left groin site with no evidence of drainage, edema, ecchymosis.   B/L LE warm with DP and PT pulses present by doppler signal  Pulmonary:      Effort: Pulmonary  "effort is normal. No respiratory distress.   Abdominal:      Palpations: Abdomen is soft.      Tenderness: There is abdominal tenderness.   Musculoskeletal:      Right lower leg: Edema present.      Left lower leg: Edema present.   Skin:     General: Skin is warm and dry.      Findings: Bruising: LLE lateral thigh.   Neurological:      Mental Status: He is alert.                     Lab Results and Cultures:   CBC with diff:   Lab Results   Component Value Date    WBC 8.74 04/03/2025    HGB 7.5 (L) 04/03/2025    HCT 23.7 (L) 04/03/2025    MCV 92 04/03/2025     04/03/2025    RBC 2.58 (L) 04/03/2025    MCH 29.1 04/03/2025    MCHC 31.6 04/03/2025    RDW 15.9 (H) 04/03/2025    MPV 9.6 04/03/2025    NRBC 0 04/03/2025      BMP/CMP:  Lab Results   Component Value Date    K 5.3 04/03/2025    CL 94 (L) 04/03/2025    CO2 27 04/03/2025    BUN 63 (H) 04/03/2025    CREATININE 7.00 (H) 04/03/2025    CALCIUM 8.5 04/03/2025    AST 14 04/03/2025    ALT 7 04/03/2025    ALKPHOS 59 04/03/2025    EGFR 7 04/03/2025   ,     Coags:   Lab Results   Component Value Date    PTT 29 04/03/2025    INR 1.51 (H) 04/03/2025   ,   Results from last 7 days   Lab Units 04/03/25  0148 04/02/25 2024   PTT seconds 29 35*   INR  1.51* 1.61*        Lipid Panel: No results found for: \"CHOL\"  Lab Results   Component Value Date    HDL 37 (L) 10/08/2024     Lab Results   Component Value Date    HDL 37 (L) 10/08/2024     Lab Results   Component Value Date    LDLCALC 70 10/08/2024     Lab Results   Component Value Date    TRIG 85 10/08/2024       HgbA1c:   Lab Results   Component Value Date    HGBA1C 5.7 (H) 02/09/2025    HGBA1C 5.5 10/08/2024    HGBA1C 6.2 (H) 07/02/2024       Blood Culture:   Lab Results   Component Value Date    BLOODCX No Growth After 5 Days. 03/12/2024    BLOODCX No Growth After 5 Days. 03/12/2024   ,   Urinalysis:   Lab Results   Component Value Date    COLORU Light Yellow 02/08/2025    CLARITYU Clear 02/08/2025    SPECGRAV 1.020 " 02/08/2025    PHUR 7.0 02/08/2025    LEUKOCYTESUR Moderate (A) 02/08/2025    NITRITE Negative 02/08/2025    GLUCOSEU 70 (7/100%) (A) 02/08/2025    KETONESU Negative 02/08/2025    BILIRUBINUR Negative 02/08/2025    BLOODU Trace (A) 02/08/2025   ,   Urine Culture:   Lab Results   Component Value Date    URINECX 30,000-39,000 cfu/ml 11/08/2024   ,   Wound Culure:    Lab Results   Component Value Date    WOUNDCULT 2+ Growth of Staphylococcus aureus (A) 03/18/2024       VTE Pharmacologic Prophylaxis: Sequential compression device (Venodyne)  and Heparin      Thank you for allowing me to participate in the care of Naresh Carpio. Please don't hesitate to call, text, email, or Message us with any questions.

## 2025-04-03 NOTE — SEDATION DOCUMENTATION
Angiogram with embolization completed by Dr. Garcia with anesthesia support. Pt transported to PACU with 2 large shopping bags of belongings. Report given to CURLY Goodman. Left groin dressing clean, dry and intact on transfer.

## 2025-04-03 NOTE — SEPSIS NOTE
Sepsis Note   Naresh Carpio 64 y.o. male MRN: [id number]  Unit/Bed#: [facility] 417-01 Encounter: [id number]       Initial Sepsis Screening       Row Name 04/03/25 0345                Is the patient's history suggestive of a new or worsening infection? No  -SP                  User Key  (r) = Recorded By, (t) = Taken By, (c) = Cosigned By      Initials Name Provider Type    SP GRANT Friedman Nurse Practitioner                        There is no height or weight on file to calculate BMI.  Wt Readings from Last 1 Encounters:   03/24/25 107 kg (234 lb 12.6 oz)        Ideal body weight: 79.9 kg (176 lb 2.4 oz)  Adjusted ideal body weight: 90.5 kg (199 lb 9.7 oz)      Tachycardia likely multifactorial from anemia, agitation, and pain. Patient with baseline elevated creatinine in the setting of ESRD .

## 2025-04-03 NOTE — ANESTHESIA PREPROCEDURE EVALUATION
Procedure:  IR EMBOLIZATION (SPECIFY VESSEL OR SITE)    Pt unable to answer questions about his last HD or specific medical history.  Possibly has missed 2HD sessions.  Unclear NPO status.  Consent obtained from pt's sister.  No issues with anesthesia previously.  1U PRBC hanging    Relevant Problems   CARDIO   (+) Diabetes mellitus type 2 with peripheral artery disease (HCC)   (+) Hypertension   (+) Hypertensive urgency   (+) PAD (peripheral artery disease) ([facility])   (+) Paroxysmal atrial fibrillation (HCC)   (+) Primary hypertension      ENDO   (+) Type 2 diabetes mellitus, without long-term current use of insulin ([facility])      /RENAL   (+) Anemia due to chronic kidney disease, on chronic dialysis ([facility])   (+) Chronic kidney disease-mineral and bone disorder   (+) 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, on chronic dialysis ([facility])   (+) ESRD (end stage renal disease) ([facility])   (+) ESRD (end stage renal disease) on dialysis ([facility])   (+) Hemodialysis status ([facility])   (+) Renal lesion      HEMATOLOGY   (+) Anemia due to chronic kidney disease, on chronic dialysis ([facility])   (+) Anemia in ESRD (end-stage renal disease)  ([facility])      MUSCULOSKELETAL   (+) Gout   (+) Osteoarthritis of left hip   (+) Traumatic rhabdomyolysis ([facility])      NEURO/PSYCH   (+) Diabetic polyneuropathy associated with type 2 diabetes mellitus ([facility])      PULMONARY   (+) Pleural effusion      Other   (+) Subacute osteomyelitis of right foot ([facility])      63 yo male with recent admission for fall and L2 compression fracture presented to the ED with new right sided pain. A CTA shows active bleeding from the right posterior abdominal wall possible from a deep circumflex iliac artery branch.       Left Ventricle: Left ventricular cavity size is normal. Wall thickness is normal. There is borderline concentric hypertrophy. The left ventricular ejection fraction is 60 to 65 % by visual estimation. Systolic function is  normal. Wall motion is normal. Diastolic function is mildly abnormal, consistent with grade I (abnormal) relaxation.    Left Atrium: The atrium is severely dilated ( 54 mL/m2).    Atrial Septum: There is no atrial septal defect by saline contrast. No patent foramen ovale detected, confirmed at rest using agitated saline contrast. 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  It has free respirophasic mobility between the right and left atrium.    Aortic Valve: There is mild regurgitation. There is aortic valve sclerosis.    Mitral Valve: There is mild annular calcification. There is mild regurgitation.    Tricuspid Valve: There is mild regurgitation.  Calculated pulmonary artery pressure around 30 mmHg.        Physical Exam    Airway  Comment: uncooperative  Mallampati score: unable to assess         Dental       Cardiovascular      Pulmonary      Other Findings        Anesthesia Plan  ASA Score- 4 Emergent    Anesthesia Type- general with ASA Monitors.         Additional Monitors:     Airway Plan: ETT.           Plan Factors-    Chart reviewed. EKG reviewed.  Existing labs reviewed. Patient summary reviewed.                  Induction- intravenous.    Postoperative Plan-         Informed Consent- Anesthetic plan and risks discussed with patient.  I personally reviewed this patient with the CRNA. Discussed and agreed on the Anesthesia Plan with the CRNA..      NPO Status:  No vitals data found for the desired time range.

## 2025-04-03 NOTE — ANESTHESIA POSTPROCEDURE EVALUATION
Post-Op Assessment Note    CV Status:  Stable  Pain Score: 0    Pain management: adequate    Multimodal analgesia used between 6 hours prior to anesthesia start to PACU discharge    Mental Status:  Alert   Hydration Status:  Stable   PONV Controlled:  None   Airway Patency:  Patent  Two or more mitigation strategies used for obstructive sleep apnea   Post Op Vitals Reviewed: Yes    No anethesia notable event occurred.    Staff: CRNA           Last Filed PACU Vitals:  Vitals Value Taken Time   Temp 98.1    Pulse 81 04/03/25 0116   /65 04/03/25 0115   Resp 19 04/03/25 0116   SpO2 99 % 04/03/25 0116   Vitals shown include unfiled device data.

## 2025-04-03 NOTE — CASE MANAGEMENT
Case Management Assessment & Discharge Planning Note    Patient name Naresh Carpio  Location [facility] 417/[facility] 417-01 MRN [id number]  : 1960 Date 4/3/2025       Current Admission Date: 4/3/2025  Current Admission Diagnosis:Traumatic retroperitoneal hemorrhage   Patient Active Problem List    Diagnosis Date Noted Date Diagnosed    Traumatic retroperitoneal hemorrhage 2025     Secondary hyperparathyroidism ([facility]) 2025     At risk for electrolyte imbalance 2025     Multiple open wounds of foot 2025     Disorder of acid-base balance 2025     Closed fracture of second lumbar vertebra ([facility]) 2025     Non-compliance with treatment 2025     Generalized weakness 2025     Gout 2025     Closed fracture of proximal end of left humerus with routine healing 02/10/2025     Left shoulder pain 2025     ESRD (end stage renal disease) on dialysis ([facility]) 2025     Primary hypertension 2025     Anemia in ESRD (end-stage renal disease)  ([facility]) 2025     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, on chronic dialysis ([facility]) 2025     Renal lesion 2024     Chronic wound 2024     Chronic diastolic (congestive) heart failure ([facility]) 2024     Pleural effusion 2024     Elevated troponin 2024     Fall 2024     Melena 2024     Paroxysmal atrial fibrillation ([facility])      ESRD (end stage renal disease) ([facility]) 10/25/2024     Nausea 10/20/2024     Hyponatremia 10/19/2024     Chronic kidney disease-mineral and bone disorder 10/14/2024     Falls 10/08/2024     PAD (peripheral artery disease) ([facility]) 10/08/2024     Closed fracture of right pelvis ([facility]) 2024     Bilateral sacral fracture, closed ([facility]) 2024     Hyperkalemia 2024     Difficulty with speech 2024     History of amputation of hallux ([facility]) 2024     Facial cellulitis 2024     Constipation  09/06/2023     Cellulitis of right foot      Polymicrobial bacterial infection 08/24/2023     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 mellitus, with necrosis of bone (HCC)      Acquired deformity of foot, right      Charcot's joint      Subacute osteomyelitis of right foot (HCC)      Open wound of right foot 08/21/2023     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 mellitus, with bone involvement without evidence of necrosis (HCC)      Diabetic ulcer of left midfoot associated with type 2 diabetes mellitus, limited to breakdown of skin (HCC)      Diabetic polyneuropathy associated with type 2 diabetes mellitus (HCC)      Diabetes mellitus type 2 with peripheral artery disease (HCC)      History of amputation of lesser toe of left foot ([facility])      Onychomycosis      Status post fall 08/19/2023     Traumatic rhabdomyolysis ([facility]) 08/19/2023     Osteoarthritis of left hip 07/27/2022     Severe Left Orchalgia 07/26/2022     Right shoulder pain 07/26/2022     Left hip pain 07/06/2022     Hemodialysis status ([facility])      Hypervolemia      Diarrhea 01/22/2022     Anemia due to chronic kidney disease, on chronic dialysis (HCC) 01/21/2022     Type 2 diabetes mellitus, without long-term current use of insulin (HCC)      Hypertension      History of TIA (transient ischemic attack)      T10 vertebral fracture ([facility])        LOS (days): 0  Geometric Mean LOS (GMLOS) (days): 6.9  Days to GMLOS:6.5     OBJECTIVE:  PATIENT READMITTED TO HOSPITAL  Risk of Unplanned Readmission Score: 67.8         Current admission status: Inpatient       Preferred Pharmacy:   [facility] [id number] [facility] [address] [place] [address]  [address] [address]  Phone: [phone] Fax: [phone]    Primary Care Provider: Jeffrey Jackson    Primary Insurance: MEDICARE  Secondary Insurance:     ASSESSMENT:  Active Health Care Proxies       Alicja Carpio Health Care Representative -     Primary Phone: [phone] (Mobile)                 Advance Directives  Primary Contact: Alicja Carpio (Sister) [phone]    Readmission Root Cause  30 Day Readmission: Yes  During your hospital stay, did someone (provider, nurse, ) explain your care to you in a way you could understand?: Yes  Did you feel medically stable to leave the hospital?: Yes  Were you able to pay for your medication at the pharmacy?: Yes  Did you have reliable transportation to take you to your appointments?: Yes  During previous admission, was a post-acute recommendation made?: Yes  What post-acute resources were offered?: STR  Patient was readmitted due to: falls, retroperitineal hemorrhage  Action Plan: IR plan, therapy recs    Patient Information  Admitted from:: Facility ([place] for SNF)  Mental Status: Alert  During Assessment patient was accompanied by: Not accompanied during assessment  Assessment information provided by::   Primary Caregiver: Self  Support Systems: Self, Family members  County of Residence: [place]  What city do you live in?: [facility]  Home entry access options. Select all that apply.: Stairs  Number of steps to enter home.: 6  Do the steps have railings?: Yes  Type of Current Residence: Apartment  Floor Level: 1  Upon entering residence, is there a bedroom on the main floor (no further steps)?: Yes  Upon entering residence, is there a bathroom on the main floor (no further steps)?: Yes  Living Arrangements: Lives Alone  Is patient a ?: No    Activities of Daily Living Prior to Admission  Functional Status: Independent  Completes ADLs independently?: Yes  Ambulates independently?: Yes  Does patient use assisted devices?: No  Does patient currently own DME?: Yes  What DME does the patient currently own?: Walker, Straight Cane  Does patient have a history of Outpatient Therapy (PT/OT)?: Yes  Does the patient have a history of Short-Term Rehab?: Yes  Does patient have a  history of HHC?: No  Does patient currently have HHC?: No    Patient Information Continued  Income Source: Pension/half-way  Does patient have prescription coverage?: Yes  Can the patient afford their medications and any related supplies (such as glucometers or test strips)?: Yes  Does patient receive dialysis treatments?: Yes (T/TH/Sat @ [facility],  Pt drives)  Does patient have a history of substance abuse?: No    Means of Transportation  Means of Transport to Appts:: Drives Self    DISCHARGE DETAILS:    Discharge planning discussed with:: Alicja Carpio (Sister) [phone]  Freedom of Choice: Yes     CM contacted family/caregiver?: Yes  Were Treatment Team discharge recommendations reviewed with patient/caregiver?: Yes  Did patient/caregiver verbalize understanding of patient care needs?: N/A- going to facility  Were patient/caregiver advised of the risks associated with not following Treatment Team discharge recommendations?: Yes    Contacts  Patient Contacts: Alicja Carpio (Sister) [phone]  Relationship to Patient:: Family  Contact Method: Phone  Phone Number: [phone]  Reason/Outcome: Emergency Contact, Discharge Planning, Continuity of Care    Requested Home Health Care         Is the patient interested in HHC at discharge?: No    DME Referral Provided  Referral made for DME?: No    Other Referral/Resources/Interventions Provided:  Interventions: Short Term Rehab    Treatment Team Recommendation: Short Term Rehab  Discharge Destination Plan:: Short Term Rehab      Cm spoke to pt's sister to discuss d/c planning as well as the role of CM  Prior to arrival, pt was at [place] for SNF rehab  Pt does have his own apartment which has 6STE. Pt's bathroom is a tub/shower with standard toilet  Pt drives. Pt is retired. Pt is independent with ADLs and his sister helps with iADLs.   Pt's had 3-4 falls. Pt has access to a walker and SPC    If recommended for IP rehab, pt's sister is  agreeable to a return to [place]. CM will discuss with pt as well

## 2025-04-03 NOTE — ED PROVIDER NOTES
Time reflects when diagnosis was documented in both MDM as applicable and the Disposition within this note       Time User Action Codes Description Comment    4/2/2025  8:59 PM Mercedes Feldman Add [K68.3] Retroperitoneal hematoma     4/2/2025  8:59 PM Mercedes Feldman Add [D68.9] Coagulopathy (HCC)     4/2/2025  8:59 PM Mercedes Feldman Add [D64.9] Anemia     4/2/2025 10:07 PM Mercedes Feldman Add [R58] Active internal bleeding           ED Disposition       ED Disposition   Transfer to Another Facility-In Network    Condition   --    Date/Time   Wed Apr 2, 2025  9:00 PM    Comment   Naresh Carpio should be transferred out to [facility].               Assessment & Plan       Medical Decision Making  Prior records reviewed.  Pt. Admitted 3/24-3/29 s/p fall resulting in L2 fracture.  He has chronic fracture of right pubic ramus and left shoulder.  Will check screening blood work and CT scan.    Pt. With large intraabdominal hematoma.  Hemoglobin dropped from 9 to 7.  Discussed with trauma, will CTA to look for active extravasation.  Pt. Medicated for pain.  Getting KCentra and unit of blood ordered.    2200- discussed with IR, pt. With active bleeding.  He is alert and /53.  Going to [facility] for emergent IR procedure.  .Critical Care Time Statement: Upon my evaluation, this patient had a high probability of imminent or life-threatening deterioration due to internal bleeding and coagulopathy, which required my direct attention, intervention, and personal management.  I spent a total of 60 minutes directly providing critical care services, including interpretation of complex medical databases, evaluating for the presence of life-threatening injuries or illnesses, management of organ system failure(s) , complex medical decision making (to support/prevent further life-threatening deterioration)., interpretation of hemodynamic data, and reversal of Eliquis, consulting with trauma and IR. This time is exclusive of  procedures, teaching, treating other patients, family meetings, and any prior time recorded by providers other than myself.       Amount and/or Complexity of Data Reviewed  Labs: ordered.  Radiology: ordered.    Risk  Prescription drug management.             Medications   desmopressin (DDAVP) 42.8 mcg in sodium chloride 0.9 % 50 mL IVPB (has no administration in time range)   sodium chloride 0.9 % infusion (has no administration in time range)   HYDROmorphone (DILAUDID) injection 0.5 mg (0.5 mg Intravenous Given 4/2/25 2025)   prothrombin complex concentrate (human) (Kcentra) 2,000 Units (2,000 Units Intravenous Given 4/2/25 2129)   HYDROmorphone (DILAUDID) injection 0.5 mg (0.5 mg Intravenous Given 4/2/25 2120)   iohexol (OMNIPAQUE) 350 MG/ML injection (MULTI-DOSE) 100 mL (100 mL Intravenous Given 4/2/25 2154)       ED Risk Strat Scores                            SBIRT 20yo+      Flowsheet Row Most Recent Value   Initial Alcohol Screen: US AUDIT-C     1. How often do you have a drink containing alcohol? 0 Filed at: 04/02/2025 1849   2. How many drinks containing alcohol do you have on a typical day you are drinking?  0 Filed at: 04/02/2025 1849   3a. Male UNDER 65: How often do you have five or more drinks on one occasion? 0 Filed at: 04/02/2025 1849   3b. FEMALE Any Age, or MALE 65+: How often do you have 4 or more drinks on one occassion? 0 Filed at: 04/02/2025 1849   Audit-C Score 0 Filed at: 04/02/2025 1849   NICOLÁS: How many times in the past year have you...    Used an illegal drug or used a prescription medication for non-medical reasons? Never Filed at: 04/02/2025 1849                            History of Present Illness       Chief Complaint   Patient presents with    Shoulder Pain     Patient arrived EMS from [place]. Patient recently seen for right shoulder injury. Patient reports sleeping on right side and awoke with increased pain. Facility currently managing pain and administered pain medication  approximately two hours ago.        Past Medical History:   Diagnosis Date    Acute cystitis 10/18/2024    Acute on chronic anemia 01/23/2022    Atrial fibrillation (HCC)     Bradycardia 09/05/2023    Diabetes mellitus (HCC)     ESRD (end stage renal disease) on dialysis (HCC)     Hematuria 10/10/2024    Hematuria 10/10/2024    Hyperkalemia 04/06/2024    Hyperkalemia 04/06/2024    Hyperlipidemia     Hypertension     Left toe amputee (HCC)     TIA (transient ischemic attack)     Toxic metabolic encephalopathy 10/08/2024      Past Surgical History:   Procedure Laterality Date    AMPUTATION Left     left foot resection 10 years ago dr tran    HEMODIALYSIS ADULT  1/18/2025    HEMODIALYSIS ADULT  2/27/2025    IR LOWER EXTREMITY ANGIOGRAM  08/29/2023    IR TEMPORARY DIALYSIS CATHETER PLACEMENT  08/25/2023    IR TUNNELED CENTRAL LINE REMOVAL  08/23/2023    IR TUNNELED DIALYSIS CATHETER PLACEMENT  01/27/2022    IR TUNNELED DIALYSIS CATHETER PLACEMENT  08/30/2023    PA AMPUTATION METATARSAL W/TOE SINGLE Right 8/31/2023    Procedure: RIGHT PARTIAL 1ST RAY RESECTION WITH  WOUND VAC APPLICATION;  Surgeon: Won Parmar DPM;  Location: [facility];  Service: Podiatry      History reviewed. No pertinent family history.   Social History     Tobacco Use    Smoking status: Never     Passive exposure: Never    Smokeless tobacco: Never   Vaping Use    Vaping status: Never Used   Substance Use Topics    Alcohol use: Not Currently     Alcohol/week: 0.0 standard drinks of alcohol     Comment: 0    Drug use: Never      E-Cigarette/Vaping    E-Cigarette Use Never User       E-Cigarette/Vaping Substances    Nicotine No     THC No     CBD No     Flavoring No     Other No     Unknown No       I have reviewed and agree with the history as documented.     63 yo male just discharged from hospital 4 days ago to [facility].  Pt. Says he won't go back there.  He says he has been screaming for them to help him for days.  He has pain in his  back, shoulder and neuropathy in his legs which are not new issues.  Today however he woke up with pain in his right side.   He thinks it is because he was sleeping on right side leaning against hard wooden bed rail.  He says the pain is severe and he thinks he has a hematoma.  No new fall.  No fever, cough, vomiting, diarrhea.  He is due for dialysis tomorrow.      History provided by:  Patient   used: No    Shoulder Pain  Associated symptoms: back pain    Associated symptoms: no fever        Review of Systems   Constitutional:  Negative for fever.   Respiratory:  Negative for cough.    Cardiovascular:  Negative for chest pain.   Gastrointestinal:  Positive for abdominal pain. Negative for diarrhea and vomiting.   Musculoskeletal:  Positive for arthralgias, back pain and gait problem.   Skin:  Negative for rash and wound.           Objective       ED Triage Vitals [04/02/25 1847]   Temperature Pulse Blood Pressure Respirations SpO2 Patient Position - Orthostatic VS   97.5 °F (36.4 °C) 85 132/60 18 96 % Sitting      Temp Source Heart Rate Source BP Location FiO2 (%) Pain Score    Oral Monitor Left arm -- 10 - Worst Possible Pain      Vitals      Date and Time Temp Pulse SpO2 Resp BP Pain Score FACES Pain Rating User   04/02/25 2130 98.1 °F (36.7 °C) 87 100 % 18 100/50 -- -- SW   04/02/25 2120 -- -- -- -- -- 10 - Worst Possible Pain -- LS   04/02/25 2025 -- -- -- -- -- 10 - Worst Possible Pain --    04/02/25 1847 97.5 °F (36.4 °C) 85 96 % 18 132/60 10 - Worst Possible Pain -- CG            Physical Exam  Vitals and nursing note reviewed.   Constitutional:       General: He is not in acute distress.     Appearance: He is not ill-appearing.      Comments: Pt. Intermittently moaning and yelling   HENT:      Head: Normocephalic and atraumatic.   Eyes:      General: No scleral icterus.     Pupils: Pupils are equal, round, and reactive to light.   Cardiovascular:      Rate and Rhythm: Normal rate and  regular rhythm.      Heart sounds: Normal heart sounds.   Pulmonary:      Effort: Pulmonary effort is normal. No respiratory distress.      Breath sounds: Normal breath sounds.   Chest:      Chest wall: Tenderness present.   Abdominal:      General: Bowel sounds are normal.      Palpations: Abdomen is soft.      Tenderness: There is abdominal tenderness.   Musculoskeletal:         General: No deformity.      Cervical back: Normal range of motion and neck supple.      Right lower leg: Edema present.      Left lower leg: Edema present.   Skin:     General: Skin is warm and dry.      Comments: Chronic stasis changes lower ext   Neurological:      General: No focal deficit present.      Mental Status: He is alert and oriented to person, place, and time.   Psychiatric:         Mood and Affect: Mood normal.         Behavior: Behavior normal.         Results Reviewed       Procedure Component Value Units Date/Time    Comprehensive metabolic panel [[id number]]  (Abnormal) Collected: 04/02/25 2024    Lab Status: Final result Specimen: Blood from Arm, Left Updated: 04/02/25 2048     Sodium 131 mmol/L      Potassium 5.4 mmol/L      Chloride 91 mmol/L      CO2 19 mmol/L      ANION GAP 21 mmol/L      BUN 55 mg/dL      Creatinine 6.60 mg/dL      Glucose 139 mg/dL      Calcium 9.1 mg/dL      AST 12 U/L      ALT 8 U/L      Alkaline Phosphatase 67 U/L      Total Protein 7.2 g/dL      Albumin 3.9 g/dL      Total Bilirubin 0.92 mg/dL      eGFR 8 ml/min/1.73sq m     Narrative:      National Kidney Disease Foundation guidelines for Chronic Kidney Disease (CKD):     Stage 1 with normal or high GFR (GFR > 90 mL/min/1.73 square meters)    Stage 2 Mild CKD (GFR = 60-89 mL/min/1.73 square meters)    Stage 3A Moderate CKD (GFR = 45-59 mL/min/1.73 square meters)    Stage 3B Moderate CKD (GFR = 30-44 mL/min/1.73 square meters)    Stage 4 Severe CKD (GFR = 15-29 mL/min/1.73 square meters)    Stage 5 End Stage CKD (GFR <15 mL/min/1.73 square  meters)  Note: GFR calculation is accurate only with a steady state creatinine    Protime-INR [[id number]]  (Abnormal) Collected: 04/02/25 2024    Lab Status: Final result Specimen: Blood from Arm, Left Updated: 04/02/25 2046     Protime 19.6 seconds      INR 1.61    Narrative:      INR Therapeutic Range    Indication                                             INR Range      Atrial Fibrillation                                               2.0-3.0  Hypercoagulable State                                    2.0.2.3  Left Ventricular Asist Device                            2.0-3.0  Mechanical Heart Valve                                  -    Aortic(with afib, MI, embolism, HF, LA enlargement,    and/or coagulopathy)                                     2.0-3.0 (2.5-3.5)     Mitral                                                             2.5-3.5  Prosthetic/Bioprosthetic Heart Valve               2.0-3.0  Venous thromboembolism (VTE: VT, PE        2.0-3.0    APTT [[id number]]  (Abnormal) Collected: 04/02/25 2024    Lab Status: Final result Specimen: Blood from Arm, Left Updated: 04/02/25 2046     PTT 35 seconds     CBC and differential [[id number]]  (Abnormal) Collected: 04/02/25 2024    Lab Status: Final result Specimen: Blood from Arm, Left Updated: 04/02/25 2032     WBC 9.24 Thousand/uL      RBC 2.55 Million/uL      Hemoglobin 7.2 g/dL      Hematocrit 23.4 %      MCV 92 fL      MCH 28.2 pg      MCHC 30.8 g/dL      RDW 16.2 %      MPV 9.6 fL      Platelets 340 Thousands/uL      nRBC 0 /100 WBCs      Segmented % 74 %      Immature Grans % 0 %      Lymphocytes % 13 %      Monocytes % 13 %      Eosinophils Relative 0 %      Basophils Relative 0 %      Absolute Neutrophils 6.75 Thousands/µL      Absolute Immature Grans 0.04 Thousand/uL      Absolute Lymphocytes 1.19 Thousands/µL      Absolute Monocytes 1.19 Thousand/µL      Eosinophils Absolute 0.03 Thousand/µL      Basophils Absolute 0.04 Thousands/µL             CT chest  abdomen pelvis wo contrast   Final Interpretation by Alex Demarco MD (04/02 2038)      Large hematoma measuring up to 11 cm within the right posterior pararenal space tracking inferiorly along the right lateroconal fascia.      Redemonstrated acute fracture of the L2 vertebral body extending to the superior endplate               Workstation performed: [id number]         CTA abdomen pelvis w wo contrast    (Results Pending)       Procedures    ED Medication and Procedure Management   Prior to Admission Medications   Prescriptions Last Dose Informant Patient Reported? Taking?   NIFEdipine (PROCARDIA XL) 30 mg 24 hr tablet   No No   Sig: Take 1 tablet (30 mg total) by mouth daily after dinner   acetaminophen (TYLENOL) 325 mg tablet   No No   Sig: Take 3 tablets (975 mg total) by mouth every 8 (eight) hours   allopurinol (ZYLOPRIM) 100 mg tablet  Self No No   Sig: Take 1 tablet (100 mg total) by mouth daily   apixaban (Eliquis) 5 mg 4/2/2025  No Yes   Sig: Take 1 tablet (5 mg total) by mouth 2 (two) times a day   aspirin 81 mg chewable tablet 4/2/2025  No Yes   Sig: Chew 1 tablet (81 mg total) daily   atorvastatin (LIPITOR) 80 mg tablet   Yes No   Sig: Take 80 mg by mouth daily   gabapentin (NEURONTIN) 100 mg capsule   No No   Sig: Take 1 capsule (100 mg total) by mouth 3 (three) times a week   lidocaine (LIDODERM) 5 %   No No   Sig: Apply 1 patch topically over 12 hours daily Remove & Discard patch within 12 hours or as directed by MD   methocarbamol (ROBAXIN) 500 mg tablet   No No   Sig: Take 1 tablet (500 mg total) by mouth every 6 (six) hours as needed for muscle spasms   sevelamer (RENAGEL) 800 mg tablet  Self No No   Sig: Take 1 tablet (800 mg total) by mouth 3 (three) times a day with meals      Facility-Administered Medications: None     Patient's Medications   Discharge Prescriptions    No medications on file     No discharge procedures on file.  ED SEPSIS DOCUMENTATION   Time reflects when diagnosis was  documented in both MDM as applicable and the Disposition within this note       Time User Action Codes Description Comment    4/2/2025  8:59 PM Mercedes Feldman [K68.3] Retroperitoneal hematoma     4/2/2025  8:59 PM Mercedes Feldman [D68.9] Coagulopathy (HCC)     4/2/2025  8:59 PM Mercedes Feldman [D64.9] Anemia     4/2/2025 10:07 PM Mercedes Feldman [R58] Active internal bleeding                  Mercedes Feldman MD  04/02/25 5451

## 2025-04-04 PROBLEM — Z99.2 ESRD ON DIALYSIS (HCC): Status: ACTIVE | Noted: 2025-04-04

## 2025-04-04 PROBLEM — T07.XXXA WOUNDS, MULTIPLE: Status: ACTIVE | Noted: 2025-04-04

## 2025-04-04 PROBLEM — N18.6 ESRD ON DIALYSIS (HCC): Status: ACTIVE | Noted: 2025-04-04

## 2025-04-04 LAB
ANION GAP SERPL CALCULATED.3IONS-SCNC: 11 MMOL/L (ref 4–13)
BUN SERPL-MCNC: 33 MG/DL (ref 5–25)
CA-I BLD-SCNC: 1.05 MMOL/L (ref 1.12–1.32)
CALCIUM SERPL-MCNC: 8.9 MG/DL (ref 8.4–10.2)
CHLORIDE SERPL-SCNC: 95 MMOL/L (ref 96–108)
CO2 SERPL-SCNC: 27 MMOL/L (ref 21–32)
CREAT SERPL-MCNC: 4.75 MG/DL (ref 0.6–1.3)
ERYTHROCYTE [DISTWIDTH] IN BLOOD BY AUTOMATED COUNT: 16.4 % (ref 11.6–15.1)
GFR SERPL CREATININE-BSD FRML MDRD: 11 ML/MIN/1.73SQ M
GLUCOSE SERPL-MCNC: 113 MG/DL (ref 65–140)
GLUCOSE SERPL-MCNC: 115 MG/DL (ref 65–140)
GLUCOSE SERPL-MCNC: 138 MG/DL (ref 65–140)
GLUCOSE SERPL-MCNC: 154 MG/DL (ref 65–140)
HCT VFR BLD AUTO: 20.9 % (ref 36.5–49.3)
HCT VFR BLD AUTO: 21.3 % (ref 36.5–49.3)
HGB BLD-MCNC: 6.5 G/DL (ref 12–17)
HGB BLD-MCNC: 6.8 G/DL (ref 12–17)
HGB BLD-MCNC: 6.9 G/DL (ref 12–17)
MAGNESIUM SERPL-MCNC: 2.2 MG/DL (ref 1.9–2.7)
MCH RBC QN AUTO: 29 PG (ref 26.8–34.3)
MCHC RBC AUTO-ENTMCNC: 31.1 G/DL (ref 31.4–37.4)
MCV RBC AUTO: 93 FL (ref 82–98)
PHOSPHATE SERPL-MCNC: 4.9 MG/DL (ref 2.3–4.1)
PLATELET # BLD AUTO: 267 THOUSANDS/UL (ref 149–390)
PMV BLD AUTO: 9.3 FL (ref 8.9–12.7)
POTASSIUM SERPL-SCNC: 4.2 MMOL/L (ref 3.5–5.3)
RBC # BLD AUTO: 2.24 MILLION/UL (ref 3.88–5.62)
SODIUM SERPL-SCNC: 133 MMOL/L (ref 135–147)
WBC # BLD AUTO: 8.27 THOUSAND/UL (ref 4.31–10.16)

## 2025-04-04 PROCEDURE — 85027 COMPLETE CBC AUTOMATED: CPT

## 2025-04-04 PROCEDURE — 85018 HEMOGLOBIN: CPT

## 2025-04-04 PROCEDURE — 97163 PT EVAL HIGH COMPLEX 45 MIN: CPT

## 2025-04-04 PROCEDURE — 84100 ASSAY OF PHOSPHORUS: CPT

## 2025-04-04 PROCEDURE — 83735 ASSAY OF MAGNESIUM: CPT

## 2025-04-04 PROCEDURE — 82330 ASSAY OF CALCIUM: CPT

## 2025-04-04 PROCEDURE — 80048 BASIC METABOLIC PNL TOTAL CA: CPT

## 2025-04-04 PROCEDURE — 97167 OT EVAL HIGH COMPLEX 60 MIN: CPT

## 2025-04-04 PROCEDURE — P9016 RBC LEUKOCYTES REDUCED: HCPCS

## 2025-04-04 PROCEDURE — 85014 HEMATOCRIT: CPT

## 2025-04-04 PROCEDURE — 99232 SBSQ HOSP IP/OBS MODERATE 35: CPT | Performed by: INTERNAL MEDICINE

## 2025-04-04 PROCEDURE — 99232 SBSQ HOSP IP/OBS MODERATE 35: CPT | Performed by: STUDENT IN AN ORGANIZED HEALTH CARE EDUCATION/TRAINING PROGRAM

## 2025-04-04 PROCEDURE — 82948 REAGENT STRIP/BLOOD GLUCOSE: CPT

## 2025-04-04 RX ADMIN — SEVELAMER HYDROCHLORIDE 1600 MG: 800 TABLET ORAL at 16:11

## 2025-04-04 RX ADMIN — NIFEDIPINE 30 MG: 30 TABLET, FILM COATED, EXTENDED RELEASE ORAL at 17:19

## 2025-04-04 RX ADMIN — OXYCODONE HYDROCHLORIDE 5 MG: 5 TABLET ORAL at 21:55

## 2025-04-04 RX ADMIN — OXYCODONE HYDROCHLORIDE 5 MG: 5 TABLET ORAL at 12:06

## 2025-04-04 RX ADMIN — GABAPENTIN 100 MG: 100 CAPSULE ORAL at 08:02

## 2025-04-04 RX ADMIN — OXYCODONE HYDROCHLORIDE 5 MG: 5 TABLET ORAL at 16:11

## 2025-04-04 RX ADMIN — HEPARIN SODIUM 5000 UNITS: 5000 INJECTION INTRAVENOUS; SUBCUTANEOUS at 13:06

## 2025-04-04 RX ADMIN — METHOCARBAMOL 500 MG: 500 TABLET ORAL at 16:11

## 2025-04-04 RX ADMIN — ATORVASTATIN CALCIUM 80 MG: 80 TABLET, FILM COATED ORAL at 16:11

## 2025-04-04 RX ADMIN — HEPARIN SODIUM 5000 UNITS: 5000 INJECTION INTRAVENOUS; SUBCUTANEOUS at 06:03

## 2025-04-04 RX ADMIN — SENNOSIDES AND DOCUSATE SODIUM 1 TABLET: 50; 8.6 TABLET ORAL at 21:55

## 2025-04-04 RX ADMIN — HYDROMORPHONE HYDROCHLORIDE 0.2 MG: 0.2 INJECTION, SOLUTION INTRAMUSCULAR; INTRAVENOUS; SUBCUTANEOUS at 10:03

## 2025-04-04 RX ADMIN — METHOCARBAMOL 500 MG: 500 TABLET ORAL at 08:02

## 2025-04-04 RX ADMIN — HYDROMORPHONE HYDROCHLORIDE 0.2 MG: 0.2 INJECTION, SOLUTION INTRAMUSCULAR; INTRAVENOUS; SUBCUTANEOUS at 13:06

## 2025-04-04 RX ADMIN — SEVELAMER HYDROCHLORIDE 1600 MG: 800 TABLET ORAL at 11:50

## 2025-04-04 RX ADMIN — CHLORHEXIDINE GLUCONATE 0.12% ORAL RINSE 15 ML: 1.2 LIQUID ORAL at 08:03

## 2025-04-04 RX ADMIN — OXYCODONE HYDROCHLORIDE 5 MG: 5 TABLET ORAL at 03:46

## 2025-04-04 RX ADMIN — ACETAMINOPHEN 975 MG: 325 TABLET, FILM COATED ORAL at 21:55

## 2025-04-04 RX ADMIN — LIDOCAINE 1 PATCH: 700 PATCH TOPICAL at 08:03

## 2025-04-04 RX ADMIN — OXYCODONE HYDROCHLORIDE 5 MG: 5 TABLET ORAL at 08:02

## 2025-04-04 RX ADMIN — ALLOPURINOL 100 MG: 100 TABLET ORAL at 08:02

## 2025-04-04 RX ADMIN — HYDROMORPHONE HYDROCHLORIDE 0.2 MG: 0.2 INJECTION, SOLUTION INTRAMUSCULAR; INTRAVENOUS; SUBCUTANEOUS at 06:31

## 2025-04-04 RX ADMIN — ACETAMINOPHEN 975 MG: 325 TABLET, FILM COATED ORAL at 06:03

## 2025-04-04 RX ADMIN — HYDROMORPHONE HYDROCHLORIDE 0.2 MG: 0.2 INJECTION, SOLUTION INTRAMUSCULAR; INTRAVENOUS; SUBCUTANEOUS at 15:02

## 2025-04-04 RX ADMIN — CHLORHEXIDINE GLUCONATE 0.12% ORAL RINSE 15 ML: 1.2 LIQUID ORAL at 21:55

## 2025-04-04 RX ADMIN — HEPARIN SODIUM 5000 UNITS: 5000 INJECTION INTRAVENOUS; SUBCUTANEOUS at 21:55

## 2025-04-04 RX ADMIN — HYDROMORPHONE HYDROCHLORIDE 0.2 MG: 0.2 INJECTION, SOLUTION INTRAMUSCULAR; INTRAVENOUS; SUBCUTANEOUS at 17:20

## 2025-04-04 RX ADMIN — ACETAMINOPHEN 975 MG: 325 TABLET, FILM COATED ORAL at 13:06

## 2025-04-04 RX ADMIN — SEVELAMER HYDROCHLORIDE 1600 MG: 800 TABLET ORAL at 08:03

## 2025-04-04 NOTE — OCCUPATIONAL THERAPY NOTE
Occupational Therapy Evaluation     Patient Name: Naresh Carpio  Today's Date: 4/4/2025  Problem List  Principal Problem:    Traumatic retroperitoneal hemorrhage  Active Problems:    Anemia due to chronic kidney disease, on chronic dialysis (HCC)    Type 2 diabetes mellitus, without long-term current use of insulin (HCC)    Chronic kidney disease-mineral and bone disorder    Paroxysmal atrial fibrillation (HCC)    Chronic diastolic (congestive) heart failure (HCC)    ESRD (end stage renal disease) on dialysis (HCC)    Primary hypertension    L2 vertebral fracture (HCC)    Secondary hyperparathyroidism (HCC)    At risk for electrolyte imbalance    ESRD on dialysis ([facility])    Wounds, multiple    Past Medical History  Past Medical History:   Diagnosis Date    Acute cystitis 10/18/2024    Acute on chronic anemia 01/23/2022    Atrial fibrillation ([facility])     Bradycardia 09/05/2023    Diabetes mellitus (HCC)     ESRD (end stage renal disease) on dialysis ([facility])     Hematuria 10/10/2024    Hematuria 10/10/2024    Hyperkalemia 04/06/2024    Hyperkalemia 04/06/2024    Hyperlipidemia     Hypertension     Left toe amputee ([facility])     TIA (transient ischemic attack)     Toxic metabolic encephalopathy 10/08/2024     Past Surgical History  Past Surgical History:   Procedure Laterality Date    AMPUTATION Left     left foot resection 10 years ago dr tran    HEMODIALYSIS ADULT  1/18/2025    HEMODIALYSIS ADULT  2/27/2025    IR EMBOLIZATION (SPECIFY VESSEL OR SITE)  4/3/2025    IR LOWER EXTREMITY ANGIOGRAM  08/29/2023    IR TEMPORARY DIALYSIS CATHETER PLACEMENT  08/25/2023    IR TUNNELED CENTRAL LINE REMOVAL  08/23/2023    IR TUNNELED DIALYSIS CATHETER PLACEMENT  01/27/2022    IR TUNNELED DIALYSIS CATHETER PLACEMENT  08/30/2023    WI AMPUTATION METATARSAL W/TOE SINGLE Right 8/31/2023    Procedure: RIGHT PARTIAL 1ST RAY RESECTION WITH  WOUND VAC APPLICATION;  Surgeon: Won Parmar DPM;  Location: [facility];  Service: Podiatry          04/04/25 1030   OT Last Visit   OT Visit Date 04/04/25   Note Type   Note type Evaluation   Pain Assessment   Pain Assessment Tool 0-10   Pain Score 10 - Worst Possible Pain   Pain Location/Orientation Orientation: Right;Location: Back  (flank)   Hospital Pain Intervention(s) Cold applied;Repositioned;Ambulation/increased activity;Emotional support;Relaxation technique   Restrictions/Precautions   Weight Bearing Precautions Per Order Yes   RUE Weight Bearing Per Order WBAT   LUE Weight Bearing Per Order WBAT   RLE Weight Bearing Per Order WBAT   LLE Weight Bearing Per Order WBAT  (in sx shoe per prior notes however pt refusing to wear same)   Braces or Orthoses (S)  LSO   Other Precautions Cognitive;Chair Alarm;Bed Alarm;Fall Risk;Pain   Home Living   Type of Home Apartment   Home Layout One level;Stairs to enter with rails  (6 NAZARIO)   Additional Comments was at [facility] for rehab PTA   Prior Function   Level of [place] Independent with ADLs;Independent with functional mobility;Independent with IADLS   Lives With Alone   Receives Help From Family   IADLs Independent with meal prep;Independent with medication management   Falls in the last 6 months 1 to 4   Vocational Retired   Lifestyle   Autonomy I adls and mobility - i iadls - family assists PRN - required increased assist s/p recent fall with L2 fx   Reciprocal Relationships supportive family   Service to Others retired   Intrinsic Gratification mostly sedentary   Subjective   Subjective c/o pain - does not recall recently receiving IV medications   ADL   Eating Assistance 5  Supervision/Setup   Grooming Assistance 4  Minimal Assistance   UB Bathing Assistance 3  Moderate Assistance   LB Bathing Assistance 3  Moderate Assistance   UB Dressing Assistance 3  Moderate Assistance   LB Dressing Assistance 3  Moderate Assistance   Toileting Assistance  3  Moderate Assistance   Bed Mobility   Additional Comments oob on approach   Transfers   Sit to Stand  3  Moderate assistance   Stand to Sit 3  Moderate assistance   Functional Mobility   Functional Mobility 3  Moderate assistance   Additional Comments only able to tolerate walking ~5' 2* c/o pain   Additional items Rolling walker   Balance   Static Sitting Fair   Dynamic Sitting Fair -   Static Standing Poor +   Dynamic Standing Poor   Ambulatory Poor   Activity Tolerance   Activity Tolerance Patient limited by fatigue;Patient limited by pain   Medical Staff Made Aware PT present for co-eval 2* medical complexity, comorbidities and limited overall tolerance to activities   RUE Assessment   RUE Assessment WFL   LUE Assessment   LUE Assessment WFL   Psychosocial   Patient Behaviors/Mood Angry;Irritable   Cognition   Overall Cognitive Status Impaired   Arousal/Participation Arousable   Attention Attends with cues to redirect   Orientation Level Oriented to person;Oriented to place;Oriented to time;Oriented to situation   Memory Decreased recall of precautions   Following Commands Follows one step commands with increased time or repetition   Comments limited insight/safety awareness - argumentative t/o   Assessment   Limitation Decreased ADL status;Decreased Safe judgement during ADL;Decreased cognition;Decreased endurance;Decreased self-care trans;Decreased high-level ADLs   Prognosis Fair   Assessment Pt is a 64 y.o. male who was admitted to [facility] on 4/3/2025 with Traumatic retroperitoneal hemorrhage s/p IR embolization. Patient  has a past medical history of Acute cystitis, Acute on chronic anemia, Atrial fibrillation (HCC), Bradycardia, Diabetes mellitus (HCC), ESRD (end stage renal disease) on dialysis ([facility]), Hematuria, Hematuria, Hyperkalemia, Hyperkalemia, Hyperlipidemia, Hypertension, Left toe amputee (HCC), TIA (transient ischemic attack), and Toxic metabolic encephalopathy.   At baseline pt was completing adls and mobility independently - family assists with iadls - has required increased  "support since recent fall with L2 fx and was at [facility] PTA. Pt lives alone in apt with 6 NAZARIO - was at [facility] for rehab PTA. Currently pt requires moderate assist for overall ADLS and moderate assist for functional mobility/transfers. Pt currently presents with impairments in the following categories -steps to enter environment, limited home support, difficulty performing ADLS, difficulty performing IADLS , limited insight into deficits, decreased initiation and engagement , health management , and environment activity tolerance, endurance, standing balance/tolerance, sitting balance/tolerance, insight, safety , judgement , and attention . These impairments, as well as pt's fatigue, pain, decreased caregiver support, risk for falls, and home environment  limit pt's ability to safely engage in all baseline areas of occupation, includingbathing, dressing, toileting, functional mobility/transfers, community mobility, laundry , house maintenance, medication management, meal prep, cleaning, social participation , and leisure activities  From OT standpoint, recommend Level II resources upon D/C. OT will continue to follow to address the below stated goals.   Goals   Patient Goals \"have less pain\"   LTG Time Frame 10-14   Long Term Goal #1 1) Mod I UB/LB adls after setup  2)  Mod I toileting and clothing management  3) Mod I bed mobility  4) Mod I functional mob/transfers to and from all surfaces with fair to fair+ balance/safety   5) Increase activity tolerance to 30-35 min for participation in adls and enjoyable activities  6) Assess DME needs   7) Demonstrate fair+ carryover with safe use of AD, management of LSO and carryover with spinal precautions/use of proper body mechanics during functional tasks   8) Assess DME needs   9) Ongoing functional cognitive assessment to assist with safe d/c recommendations   Plan   Treatment Interventions ADL retraining;Functional transfer training;Endurance " training;Patient/family training;Equipment evaluation/education;Compensatory technique education;Activityengagement   Goal Expiration Date 04/18/25   OT Frequency 2-3x/wk   Discharge Recommendation   Rehab Resource Intensity Level, OT II (Moderate Resource Intensity)   AM-PAC Daily Activity Inpatient   Lower Body Dressing 2   Bathing 2   Toileting 2   Upper Body Dressing 2   Grooming 3   Eating 3   Daily Activity Raw Score 14   Daily Activity Standardized Score (Calc for Raw Score >=11) 33.39   AM-PAC Applied Cognition Inpatient   Following a Speech/Presentation 2   Understanding Ordinary Conversation 3   Taking Medications 2   Remembering Where Things Are Placed or Put Away 2   Remembering List of 4-5 Errands 2   Taking Care of Complicated Tasks 2   Applied Cognition Raw Score 13   Applied Cognition Standardized Score 30.46   End of Consult   Education Provided Yes   Patient Position at End of Consult Bedside chair;Bed/Chair alarm activated;All needs within reach   Nurse Communication Nurse aware of consult       The patient's raw score on the AM-PAC Daily Activity Inpatient Short Form is 14. A raw score of less than 19 suggests the patient may benefit from discharge to post-acute rehabilitation services. Please refer to the recommendation of the Occupational Therapist for safe discharge planning.    Documentation Completed By:    MOSES Montiel/L  [employer] Certified - [id number]

## 2025-04-04 NOTE — PLAN OF CARE
Problem: OCCUPATIONAL THERAPY ADULT  Goal: Performs self-care activities at highest level of function for planned discharge setting.  See evaluation for individualized goals.  Description: Treatment Interventions: ADL retraining, Functional transfer training, Endurance training, Patient/family training, Equipment evaluation/education, Compensatory technique education, Activityengagement          See flowsheet documentation for full assessment, interventions and recommendations.   Note: Limitation: Decreased ADL status, Decreased Safe judgement during ADL, Decreased cognition, Decreased endurance, Decreased self-care trans, Decreased high-level ADLs  Prognosis: Fair  Assessment: Pt is a 64 y.o. male who was admitted to [facility] on 4/3/2025 with Traumatic retroperitoneal hemorrhage s/p IR embolization. Patient  has a past medical history of Acute cystitis, Acute on chronic anemia, Atrial fibrillation (HCC), Bradycardia, Diabetes mellitus ([facility]), ESRD (end stage renal disease) on dialysis ([facility]), Hematuria, Hematuria, Hyperkalemia, Hyperkalemia, Hyperlipidemia, Hypertension, Left toe amputee ([facility]), TIA (transient ischemic attack), and Toxic metabolic encephalopathy.   At baseline pt was completing adls and mobility independently - family assists with iadls - has required increased support since recent fall with L2 fx and was at [facility] PTA. Pt lives alone in apt with 6 NAZARIO - was at [facility] for rehab PTA. Currently pt requires moderate assist for overall ADLS and moderate assist for functional mobility/transfers. Pt currently presents with impairments in the following categories -steps to enter environment, limited home support, difficulty performing ADLS, difficulty performing IADLS , limited insight into deficits, decreased initiation and engagement , health management , and environment activity tolerance, endurance, standing balance/tolerance, sitting balance/tolerance, insight, safety , judgement ,  and attention . These impairments, as well as pt's fatigue, pain, decreased caregiver support, risk for falls, and home environment  limit pt's ability to safely engage in all baseline areas of occupation, includingbathing, dressing, toileting, functional mobility/transfers, community mobility, laundry , house maintenance, medication management, meal prep, cleaning, social participation , and leisure activities  From OT standpoint, recommend Level II resources upon D/C. OT will continue to follow to address the below stated goals.     Rehab Resource Intensity Level, OT: II (Moderate Resource Intensity)

## 2025-04-04 NOTE — PLAN OF CARE
Problem: PAIN - ADULT  Goal: Verbalizes/displays adequate comfort level or baseline comfort level  Description: Interventions:- Encourage patient to monitor pain and request assistance- Assess pain using appropriate pain scale- Administer analgesics based on type and severity of pain and evaluate response- Implement non-pharmacological measures as appropriate and evaluate response- Consider cultural and social influences on pain and pain management- Notify physician/advanced practitioner if interventions unsuccessful or patient reports new pain  Outcome: Progressing     Problem: INFECTION - ADULT  Goal: Absence or prevention of progression during hospitalization  Description: INTERVENTIONS:- Assess and monitor for signs and symptoms of infection- Monitor lab/diagnostic results- Monitor all insertion sites, i.e. indwelling lines, tubes, and drains- Monitor endotracheal if appropriate and nasal secretions for changes in amount and color- Merkel appropriate cooling/warming therapies per order- Administer medications as ordered- Instruct and encourage patient and family to use good hand hygiene technique- Identify and instruct in appropriate isolation precautions for identified infection/condition  Outcome: Progressing

## 2025-04-04 NOTE — PLAN OF CARE
Problem: PHYSICAL THERAPY ADULT  Goal: Performs mobility at highest level of function for planned discharge setting.  See evaluation for individualized goals.  Description: Treatment/Interventions: Functional transfer training, LE strengthening/ROM, Elevations, Therapeutic exercise, Endurance training, Patient/family training, Equipment eval/education, Bed mobility, Gait training, Spoke to nursing, OT  Equipment Recommended: Walker       See flowsheet documentation for full assessment, interventions and recommendations.  Note: Prognosis: Good  Problem List: Decreased strength, Decreased endurance, Impaired balance, Decreased mobility, Pain, Decreased safety awareness, Impaired judgement  Assessment: Pt is 64 y.o. male seen for PT evaluation s/p admit to [facility] on 4/3/2025. Two pt identifiers were used to confirm. Pt presented s/p fall.  Pt was admitted with a primary dx of: traumatic retroperitoneal hemorrhage, and other active problems including CKD, multiple wounds, L2 fx, paroxysmal A fib, chronic diastolic heart failure, ESRD on dialysis, primary HTN, type 2 DM.  PT now consulted for assessment of mobility and d/c needs.   Pts current co morbidities affecting treatment include:  has a past medical history of Acute cystitis, Acute on chronic anemia, Atrial fibrillation (HCC), Bradycardia, Diabetes mellitus (HCC), ESRD (end stage renal disease) on dialysis (HCC), Hematuria, Hematuria, Hyperkalemia, Hyperkalemia, Hyperlipidemia, Hypertension, Left toe amputee (HCC), TIA (transient ischemic attack), and Toxic metabolic encephalopathy. . Pts current clinical presentation is Unstable/ Unpredictable (high complexity) due to Ongoing medical management for primary dx, Increased reliance on more restrictive AD compared to baseline, Decreased activity tolerance compared to baseline, Fall risk, Increased assistance needed from caregiver at current time, Cog status, Spinal precautions at current time,  Continuous pulse oximetry monitoring   .  Upon evaluation, pt currently is requiring Mod Ax1 for transfers and Min Ax1 for ambulation w/ RW. Pt presents at PT eval functioning below baseline and currently w/ overall mobility deficits 2* to: BLE weakness, decreased ROM, impaired balance, decreased endurance, gait deviations, pain, decreased activity tolerance compared to baseline, decreased safety awareness, impaired judgement, fall risk, spinal precautions.  At conclusion of PT session pt returned back in chair and chair alarm engaged with phone and call bell within reach. Pt denies any further questions at this time. PT is currently recommending Level II resource intensity. PT will continue to follow during hospital stay.        Rehab Resource Intensity Level, PT: II (Moderate Resource Intensity)    See flowsheet documentation for full assessment.

## 2025-04-04 NOTE — QUICK NOTE
Patient arrived to trauma floor. Stable at this time. Says he has some R going/buttock pain that has been baseline for him during this admission. Denies any other complaints. 2+ R femoral pulse; normal distal perfusion of foot. RLE neruovasc in tact. No overlying skin changes or hernia R groin.

## 2025-04-04 NOTE — ASSESSMENT & PLAN NOTE
History of a fall onto a rail a few days ago. Continued to have progressive pain  Presented to [place] ED, found to have retroperitoneal hemorrhage with areas of active contrast extravasation. Transferred to B  S/p IR embolization of branch of the deep circumflex iliac artery and superior gluteal artery overnight  Management per critical care

## 2025-04-04 NOTE — PHYSICAL THERAPY NOTE
PHYSICAL THERAPY EVALUATION  NAME:  Naresh Carpio  DATE: 04/04/25    AGE:   64 y.o.  Mrn:   [id number]  ADMIT DX:  Hematoma [T14.8XXA]    Past Medical History:   Diagnosis Date    Acute cystitis 10/18/2024    Acute on chronic anemia 01/23/2022    Atrial fibrillation (HCC)     Bradycardia 09/05/2023    Diabetes mellitus (HCC)     ESRD (end stage renal disease) on dialysis (HCC)     Hematuria 10/10/2024    Hematuria 10/10/2024    Hyperkalemia 04/06/2024    Hyperkalemia 04/06/2024    Hyperlipidemia     Hypertension     Left toe amputee (HCC)     TIA (transient ischemic attack)     Toxic metabolic encephalopathy 10/08/2024       Past Surgical History:   Procedure Laterality Date    AMPUTATION Left     left foot resection 10 years ago dr tran    HEMODIALYSIS ADULT  1/18/2025    HEMODIALYSIS ADULT  2/27/2025    IR EMBOLIZATION (SPECIFY VESSEL OR SITE)  4/3/2025    IR LOWER EXTREMITY ANGIOGRAM  08/29/2023    IR TEMPORARY DIALYSIS CATHETER PLACEMENT  08/25/2023    IR TUNNELED CENTRAL LINE REMOVAL  08/23/2023    IR TUNNELED DIALYSIS CATHETER PLACEMENT  01/27/2022    IR TUNNELED DIALYSIS CATHETER PLACEMENT  08/30/2023    LA AMPUTATION METATARSAL W/TOE SINGLE Right 8/31/2023    Procedure: RIGHT PARTIAL 1ST RAY RESECTION WITH  WOUND VAC APPLICATION;  Surgeon: Won Parmar DPM;  Location: [facility];  Service: Podiatry       Length Of Stay: 1    PHYSICAL THERAPY EVALUATION:        04/04/25 1036   Note Type   Note type Evaluation   Pain Assessment   Pain Assessment Tool 0-10   Pain Score 10 - Worst Possible Pain   Pain Location/Orientation Location: Back   Pain Onset/Description Onset: Ongoing;Frequency: Constant/Continuous;Descriptor: Aching   Effect of Pain on Daily Activities increaseed pain with activity   Patient's Stated Pain Goal No pain   Restrictions/Precautions   Weight Bearing Precautions Per Order Yes   LLE Weight Bearing Per Order WBAT  (in sx shoe per podiatry from previous admission. Pt refusing to wear)    Braces or Orthoses (S)  LSO  (for L2 fx from previous hospital admission)   Other Precautions Chair Alarm;Bed Alarm;Cognitive;WBS;Multiple lines;Fall Risk;Pain;Spinal precautions   Home Living   Type of Home Apartment   Home Layout One level;Stairs to enter with rails  (6 NAZARIO)   Additional Comments Pt questionable historian. Per CM, Pt resides in the above setup alone however pt was at rehab P[facility] following a recent hospital admission   Prior Function   Level of [place] Independent with functional mobility   Lives With Alone   Receives Help From Family   Falls in the last 6 months 1 to 4   General   Family/Caregiver Present No   Cognition   Overall Cognitive Status Impaired   Arousal/Participation Alert   Orientation Level Oriented to person;Oriented to place;Oriented to time;Oriented to situation   Memory Decreased recall of precautions;Decreased recall of recent events   Following Commands Follows one step commands with increased time or repetition   RUE Assessment   RUE Assessment WFL   LUE Assessment   LUE Assessment WFL   RLE Assessment   RLE Assessment WFL   Strength RLE   RLE Overall Strength 4-/5   LLE Assessment   LLE Assessment WFL   Strength LLE   LLE Overall Strength 4-/5   Bed Mobility   Additional Comments NA, Pt seated OOB in chair at time of PT treatment session   Transfers   Sit to Stand 3  Moderate assistance   Additional items Assist x 1;Increased time required;Verbal cues   Stand to Sit 3  Moderate assistance   Additional items Assist x 1;Increased time required;Verbal cues   Ambulation/Elevation   Gait pattern Excessively slow;Short stride;Foward flexed;Inconsistent amira;Decreased foot clearance   Gait Assistance 4  Minimal assist   Additional items Assist x 1   Assistive Device Rolling walker   Distance 5ft  (limited by increased pain)   Balance   Static Sitting Fair -   Static Standing Poor +   Ambulatory Poor +   Endurance Deficit   Endurance Deficit Yes   Endurance Deficit  Description fatigue, pain   Activity Tolerance   Activity Tolerance Patient limited by fatigue;Patient limited by pain   Medical Staff Made Aware Tello, OT; Akua, SPT; OT present for co evaluation due to pts current medical presentation   Nurse Made Aware Pt appropriate to be seen and mobilize per nsg   Assessment   Prognosis Good   Problem List Decreased strength;Decreased endurance;Impaired balance;Decreased mobility;Pain;Decreased safety awareness;Impaired judgement   Assessment Pt is 64 y.o. male seen for PT evaluation s/p admit to [facility] on 4/3/2025. Two pt identifiers were used to confirm. Pt presented s/p fall.  Pt was admitted with a primary dx of: traumatic retroperitoneal hemorrhage, and other active problems including CKD, multiple wounds, L2 fx, paroxysmal A fib, chronic diastolic heart failure, ESRD on dialysis, primary HTN, type 2 DM.  PT now consulted for assessment of mobility and d/c needs.   Pts current co morbidities affecting treatment include:  has a past medical history of Acute cystitis, Acute on chronic anemia, Atrial fibrillation (HCC), Bradycardia, Diabetes mellitus (HCC), ESRD (end stage renal disease) on dialysis (HCC), Hematuria, Hematuria, Hyperkalemia, Hyperkalemia, Hyperlipidemia, Hypertension, Left toe amputee (HCC), TIA (transient ischemic attack), and Toxic metabolic encephalopathy. . Pts current clinical presentation is Unstable/ Unpredictable (high complexity) due to Ongoing medical management for primary dx, Increased reliance on more restrictive AD compared to baseline, Decreased activity tolerance compared to baseline, Fall risk, Increased assistance needed from caregiver at current time, Cog status, Spinal precautions at current time, Continuous pulse oximetry monitoring   .  Upon evaluation, pt currently is requiring Mod Ax1 for transfers and Min Ax1 for ambulation w/ RW. Pt presents at PT eval functioning below baseline and currently w/ overall mobility  "deficits 2* to: BLE weakness, decreased ROM, impaired balance, decreased endurance, gait deviations, pain, decreased activity tolerance compared to baseline, decreased safety awareness, impaired judgement, fall risk, spinal precautions.  At conclusion of PT session pt returned back in chair and chair alarm engaged with phone and call bell within reach. Pt denies any further questions at this time. PT is currently recommending Level II resource intensity. PT will continue to follow during hospital stay.   Goals   Patient Goals \" to have less pain\"   [facility] Expiration Date 04/18/25   Short Term Goal #1 In 14 days pt will complete: 1) Bed mobility skills with mod I to increase safety and independence as well as decrease caregiver burden. 2) Functional transfers with mod I to promote increased independence, safety, and QOL. 3) Ambulate 150' using least restrictive AD with mo dI without LOB and stable vitals so that pt can negotiate previous living environment safely and promote independence with functional mobility and return to PLOF. 4) Stair training up/ down 3 step/s using rail/s with mod I so that pt can enter/negotiate previous living environment safely and decrease fall risk. 5) Improve balance grades by 1/2 grade to increase safety with all mobility and decrease fall risk.  6) Improve BLE strength by 1/2 grade to help increase overall functional mobility and decrease fall risk.   Plan   Treatment/Interventions Functional transfer training;LE strengthening/ROM;Elevations;Therapeutic exercise;Endurance training;Patient/family training;Equipment eval/education;Bed mobility;Gait training;Spoke to nursing;OT   PT Frequency 3-5x/wk   Discharge Recommendation   Rehab Resource Intensity Level, PT II (Moderate Resource Intensity)   Equipment Recommended Walker   Walker Package Recommended Wheeled walker   AM-PAC Basic Mobility Inpatient   Turning in Flat Bed Without Bedrails 3   Lying on Back to Sitting on Edge of Flat Bed " Without Bedrails 3   Moving Bed to Chair 3   Standing Up From Chair Using Arms 3   Walk in Room 2   Climb 3-5 Stairs With Railing 2   Basic Mobility Inpatient Raw Score 16   Basic Mobility Standardized Score 38.32   [facility] Highest Level Of Mobility   -HL Goal 5: Stand one or more mins   -HLM Achieved 6: Walk 10 steps or more   Modified [place] Scale   Modified Andrzej Scale 4   Portions of the documentation may have been created using voice recognition software.Occasional wrong word or sound alike substitutions may have occurred due to the inherent limitations of the voice recognition software. Read the chart carefully and recognize, using context, where substitutions have occurred.    Damián Rosales, PT, DPT

## 2025-04-04 NOTE — PLAN OF CARE
Problem: PAIN - ADULT  Goal: Verbalizes/displays adequate comfort level or baseline comfort level  Description: Interventions:- Encourage patient to monitor pain and request assistance- Assess pain using appropriate pain scale- Administer analgesics based on type and severity of pain and evaluate response- Implement non-pharmacological measures as appropriate and evaluate response- Consider cultural and social influences on pain and pain management- Notify physician/advanced practitioner if interventions unsuccessful or patient reports new pain  4/3/2025 2050 by Anat Pompa RN  Outcome: Progressing  4/3/2025 2050 by Anat Pompa RN  Outcome: Progressing     Problem: INFECTION - ADULT  Goal: Absence or prevention of progression during hospitalization  Description: INTERVENTIONS:- Assess and monitor for signs and symptoms of infection- Monitor lab/diagnostic results- Monitor all insertion sites, i.e. indwelling lines, tubes, and drains- Monitor endotracheal if appropriate and nasal secretions for changes in amount and color- [place] appropriate cooling/warming therapies per order- Administer medications as ordered- Instruct and encourage patient and family to use good hand hygiene technique- Identify and instruct in appropriate isolation precautions for identified infection/condition  4/3/2025 2050 by Anat Pompa RN  Outcome: Progressing  4/3/2025 2050 by Anat Pompa RN  Outcome: Progressing

## 2025-04-04 NOTE — ASSESSMENT & PLAN NOTE
Wt Readings from Last 3 Encounters:   04/03/25 100 kg (221 lb)   03/24/25 107 kg (234 lb 12.6 oz)   02/27/25 102 kg (224 lb 13.9 oz)     - Continue procardia xl 30mg

## 2025-04-04 NOTE — ASSESSMENT & PLAN NOTE
TTS at [facility] IN [place]  Access: Right IJ permcath  EDW: 99 kg per previous hospital records  Continue with current dialysis schedule, postdialysis weight yesterday 98.6 kg

## 2025-04-04 NOTE — ASSESSMENT & PLAN NOTE
Lab Results   Component Value Date    EGFR 11 04/04/2025    EGFR 7 04/03/2025    EGFR 7 04/03/2025    CREATININE 4.75 (H) 04/04/2025    CREATININE 7.00 (H) 04/03/2025    CREATININE 6.78 (H) 04/03/2025

## 2025-04-04 NOTE — ASSESSMENT & PLAN NOTE
Lab Results   Component Value Date    HGBA1C 5.7 (H) 02/09/2025       Recent Labs     04/03/25  0734 04/03/25  1816 04/03/25 2036 04/04/25  0710   POCGLU 109 145* 135 113       Blood Sugar Average: Last 72 hrs:  (P) 127.8  - Not currently requiring SSI. Continue to monitor

## 2025-04-04 NOTE — ASSESSMENT & PLAN NOTE
- CT CAP 4/2: Fracture of L2 vertebral body extending to the superior endplate; seen on scan from 3/24 as well  - PT/OT; OOB and ambulation as able

## 2025-04-04 NOTE — ASSESSMENT & PLAN NOTE
- CTA CAP 4/2: right retroperitoneal hematoma with areas of active contrast extravasation   - S/p IR embolization of branch of the R deep circumflex iliac artery and R superior gluteal artery   - On heparin for DVT ppx   - Hemoglobin 6.5 from 7.5 4/3; 1u PRBC ordered; no findings on skin exam

## 2025-04-04 NOTE — PLAN OF CARE
Problem: PAIN - ADULT  Goal: Verbalizes/displays adequate comfort level or baseline comfort level  Description: Interventions:- Encourage patient to monitor pain and request assistance- Assess pain using appropriate pain scale- Administer analgesics based on type and severity of pain and evaluate response- Implement non-pharmacological measures as appropriate and evaluate response- Consider cultural and social influences on pain and pain management- Notify physician/advanced practitioner if interventions unsuccessful or patient reports new pain  Outcome: Progressing     Problem: INFECTION - ADULT  Goal: Absence or prevention of progression during hospitalization  Description: INTERVENTIONS:- Assess and monitor for signs and symptoms of infection- Monitor lab/diagnostic results- Monitor all insertion sites, i.e. indwelling lines, tubes, and drains- Monitor endotracheal if appropriate and nasal secretions for changes in amount and color- [place] appropriate cooling/warming therapies per order- Administer medications as ordered- Instruct and encourage patient and family to use good hand hygiene technique- Identify and instruct in appropriate isolation precautions for identified infection/condition  Outcome: Progressing  Goal: Absence of fever/infection during neutropenic period  Description: INTERVENTIONS:- Monitor WBC  Outcome: Progressing     Problem: SAFETY ADULT  Goal: Patient will remain free of falls  Description: INTERVENTIONS:- Educate patient/family on patient safety including physical limitations- Instruct patient to call for assistance with activity - Consult OT/PT to assist with strengthening/mobility - Keep Call bell within reach- Keep bed low and locked with side rails adjusted as appropriate- Keep care items and personal belongings within reach- Initiate and maintain comfort rounds- Make Fall Risk Sign visible to staff- Offer Toileting every 1 Hours, in advance of need- Initiate/Maintain alarm-  Obtain necessary fall risk management equipment- Apply yellow socks and bracelet for high fall risk patients- Consider moving patient to room near nurses station  Outcome: Progressing  Goal: Maintain or return to baseline ADL function  Description: INTERVENTIONS:-  Assess patient's ability to carry out ADLs; assess patient's baseline for ADL function and identify physical deficits which impact ability to perform ADLs (bathing, care of mouth/teeth, toileting, grooming, dressing, etc.)- Assess/evaluate cause of self-care deficits - Assess range of motion- Assess patient's mobility; develop plan if impaired- Assess patient's need for assistive devices and provide as appropriate- Encourage maximum independence but intervene and supervise when necessary- Involve family in performance of ADLs- Assess for home care needs following discharge - Consider OT consult to assist with ADL evaluation and planning for discharge- Provide patient education as appropriate  Outcome: Progressing  Goal: Maintains/Returns to pre admission functional level  Description: INTERVENTIONS:- Perform AM-PAC 6 Click Basic Mobility/ Daily Activity assessment daily.- Set and communicate daily mobility goal to care team and patient/family/caregiver. - Collaborate with rehabilitation services on mobility goals if consulted- Perform Range of Motion 3 times a day.- Reposition patient every 2 hours.- Dangle patient 3 times a day- Stand patient 3 times a day- Ambulate patient 3 times a day- Out of bed to chair 3 times a day - Out of bed for meals 3 times a day- Out of bed for toileting- Record patient progress and toleration of activity level   Outcome: Progressing     Problem: DISCHARGE PLANNING  Goal: Discharge to home or other facility with appropriate resources  Description: INTERVENTIONS:- Identify barriers to discharge w/patient and caregiver- Arrange for needed discharge resources and transportation as appropriate- Identify discharge learning needs  (meds, wound care, etc.)- Arrange for interpretive services to assist at discharge as needed- Refer to Case Management Department for coordinating discharge planning if the patient needs post-hospital services based on physician/advanced practitioner order or complex needs related to functional status, cognitive ability, or social support system  Outcome: Progressing     Problem: Knowledge Deficit  Goal: Patient/family/caregiver demonstrates understanding of disease process, treatment plan, medications, and discharge instructions  Description: Complete learning assessment and assess knowledge base.Interventions:- Provide teaching at level of understanding- Provide teaching via preferred learning methods  Outcome: Progressing     Problem: Prexisting or High Potential for Compromised Skin Integrity  Goal: Skin integrity is maintained or improved  Description: INTERVENTIONS:- Identify patients at risk for skin breakdown- Assess and monitor skin integrity- Assess and monitor nutrition and hydration status- Monitor labs - Assess for incontinence - Turn and reposition patient- Assist with mobility/ambulation- Relieve pressure over bony prominences- Avoid friction and shearing- Provide appropriate hygiene as needed including keeping skin clean and dry- Evaluate need for skin moisturizer/barrier cream- Collaborate with interdisciplinary team - Patient/family teaching- Consider wound care consult   Outcome: Progressing     Problem: METABOLIC, FLUID AND ELECTROLYTES - ADULT  Goal: Electrolytes maintained within normal limits  Description: INTERVENTIONS:- Monitor labs and assess patient for signs and symptoms of electrolyte imbalances- Administer electrolyte replacement as ordered- Monitor response to electrolyte replacements, including repeat lab results as appropriate- Instruct patient on fluid and nutrition as appropriate  Outcome: Progressing  Goal: Fluid balance maintained  Description: INTERVENTIONS:- Monitor labs -  Monitor I/O and WT- Instruct patient on fluid and nutrition as appropriate- Assess for signs & symptoms of volume excess or deficit  Outcome: Progressing

## 2025-04-04 NOTE — PROGRESS NOTES
Progress Note - Trauma   Name: Naresh Carpio 64 y.o. male I MRN: [id number]  [facility] Date of Admission: 4/3/2025   Date of Service: 4/4/2025 I Hospital Day: 1    Assessment & Plan  Traumatic retroperitoneal hemorrhage  - CTA CAP 4/2: right retroperitoneal hematoma with areas of active contrast extravasation   - S/p IR embolization of branch of the R deep circumflex iliac artery and R superior gluteal artery   - On heparin for DVT ppx   - Hemoglobin 6.5 from 7.5 4/3; 1u PRBC ordered; no findings on skin exam   Chronic kidney disease-mineral and bone disorder  Lab Results   Component Value Date    EGFR 11 04/04/2025    EGFR 7 04/03/2025    EGFR 7 04/03/2025    CREATININE 4.75 (H) 04/04/2025    CREATININE 7.00 (H) 04/03/2025    CREATININE 6.78 (H) 04/03/2025     Wounds, multiple  - Bilateral foot wounds; R anterior shin wound  - Wound care consulted   L2 vertebral fracture (HCC)  - CT CAP 4/2: Fracture of L2 vertebral body extending to the superior endplate; seen on scan from 3/24 as well  - PT/OT; OOB and ambulation as able   Paroxysmal atrial fibrillation (HCC)  - Home Eliquis 5mg BID and aspirin 81mg currently held   Chronic diastolic (congestive) heart failure (HCC)  Wt Readings from Last 3 Encounters:   04/03/25 100 kg (221 lb)   03/24/25 107 kg (234 lb 12.6 oz)   02/27/25 102 kg (224 lb 13.9 oz)     - Continue procardia xl 30mg   ESRD (end stage renal disease) on dialysis (HCC)  Lab Results   Component Value Date    EGFR 11 04/04/2025    EGFR 7 04/03/2025    EGFR 7 04/03/2025    CREATININE 4.75 (H) 04/04/2025    CREATININE 7.00 (H) 04/03/2025    CREATININE 6.78 (H) 04/03/2025   - On TTS dilaysis via permacath   - Last dialysis 4/3   - Sevelamer 1,600mg TID   Primary hypertension  - Continue home procardia   Type 2 diabetes mellitus, without long-term current use of insulin ([facility])  Lab Results   Component Value Date    HGBA1C 5.7 (H) 02/09/2025       Recent Labs     04/03/25  0734 04/03/25  181  "04/03/25 2036 04/04/25  0710   POCGLU 109 145* 135 113       Blood Sugar Average: Last 72 hrs:  (P) 127.8  - Not currently requiring SSI. Continue to monitor     Bowel Regimen: ordered  VTE Prophylaxis:VTE covered by:  heparin (porcine), Subcutaneous, 5,000 Units at 04/04/25 0603         Disposition: Pending stable hemoglobin, PT/OT I have discussed the above management plan in detail with the primary service.     24 Hour Events : Patient transferred out of the ICU last night   Subjective : Patient complaining of RLQ/R flank pain this morning. Unclear chronicity. When asked if it started last night and the pain is new or different, \"yes\". When asked if it is the same as yesterday and is baseline, \"yes\". When asked if pain was present on arrival, \"yes\".  Patient also stating pain regimen was inadequate overnight.     Objective :  Temp:  [97.6 °F (36.4 °C)-98.7 °F (37.1 °C)] 98.7 °F (37.1 °C)  HR:  [75-87] 80  BP: ()/(42-69) 122/54  Resp:  [12-21] 16  SpO2:  [96 %-100 %] 100 %  O2 Device: None (Room air)    I/O         04/02 0701  04/03 0700 04/03 0701  04/04 0700    P.O. 0 720    I.V. (mL/kg) 50 (0.5) 722.5 (7.2)    IV Piggyback  100    Total Intake(mL/kg) 50 (0.5) 1542.5 (15.4)    Urine (mL/kg/hr) 0 0 (0)    Other  3000    Total Output 0 3000    Net +50 -1457.5                Lines/Drains/Airways       Active Status       Name Placement date Placement time Site Days    HD Permanent Double Catheter 08/30/23  --  Internal jugular  583                  Physical Exam  Constitutional:       General: He is not in acute distress.     Appearance: He is not ill-appearing.   HENT:      Head: Normocephalic and atraumatic.      Nose: Nose normal.   Eyes:      Pupils: Pupils are equal, round, and reactive to light.   Cardiovascular:      Rate and Rhythm: Normal rate and regular rhythm.   Pulmonary:      Effort: Pulmonary effort is normal. No respiratory distress.   Abdominal:      General: Abdomen is flat. There is no " distension.      Tenderness: There is abdominal tenderness (RLQ). There is right CVA tenderness. There is no left CVA tenderness.   Musculoskeletal:      Cervical back: Normal range of motion.   Skin:     General: Skin is warm.      Findings: No bruising.      Comments: No ecchymosis across abdomen, flank, or buttocks. Exam of buttocks somewhat limited d/t poor patient cooperation. No palpable hematoma.    Neurological:      General: No focal deficit present.      Mental Status: He is alert and oriented to person, place, and time.      Cranial Nerves: No cranial nerve deficit.               Lab Results: I have reviewed the following results:  Recent Labs     04/03/25  0148 04/03/25  0545 04/03/25  1524 04/04/25  0628   WBC 9.65 8.74  --  8.27   HGB 6.5* 7.5*   < > 6.5*   HCT 20.7* 23.7*  --  20.9*    264  --  267   SODIUM 132* 133*  --  133*   K 5.0 5.3  --  4.2   CL 93* 94*  --  95*   CO2 22 27  --  27   BUN 58* 63*  --  33*   CREATININE 6.78* 7.00*  --  4.75*   GLUC 154* 140  --  115   CAIONIZED 1.05*  --   --  1.05*   MG 2.1 2.2  --  2.2   PHOS 7.7* 8.3*  --  4.9*   AST 10* 14  --   --    ALT 7 7  --   --    ALB 3.4* 3.5  --   --    TBILI 1.12* 1.14*  --   --    ALKPHOS 62 59  --   --    PTT 29  --   --   --    INR 1.51*  --   --   --    LACTICACID 0.9  --   --   --     < > = values in this interval not displayed.       Imaging Results Review: No pertinent imaging studies reviewed.  Other Study Results Review: No additional pertinent studies reviewed.

## 2025-04-04 NOTE — ASSESSMENT & PLAN NOTE
Incoming refill request for doxycycline 50 mg capsules.  Unfortunately, patient has not been seen since 4/15/21.  Yearly visit is needed for refills.        Reception, please call patient to offer appointment.     Lab Results   Component Value Date    EGFR 11 04/04/2025    EGFR 7 04/03/2025    EGFR 7 04/03/2025    CREATININE 4.75 (H) 04/04/2025    CREATININE 7.00 (H) 04/03/2025    CREATININE 6.78 (H) 04/03/2025   - On TTS dilaysis via permacath   - Last dialysis 4/3   - Sevelamer 1,600mg TID

## 2025-04-04 NOTE — CASE MANAGEMENT
Case Management Discharge Planning Note    Patient name Naresh Carpio  Location [facility] 618/[facility] 618-01 MRN [id number]  : 1960 Date 2025       Current Admission Date: 4/3/2025  Current Admission Diagnosis:Traumatic retroperitoneal hemorrhage   Patient Active Problem List    Diagnosis Date Noted Date Diagnosed    ESRD on dialysis ([facility]) 2025     Wounds, multiple 2025     Traumatic retroperitoneal hemorrhage 2025     Secondary hyperparathyroidism ([facility]) 2025     At risk for electrolyte imbalance 2025     Multiple open wounds of foot 2025     Disorder of acid-base balance 2025     L2 vertebral fracture ([facility]) 2025     Non-compliance with treatment 2025     Generalized weakness 2025     Gout 2025     Closed fracture of proximal end of left humerus with routine healing 02/10/2025     Left shoulder pain 2025     ESRD (end stage renal disease) on dialysis ([facility]) 2025     Primary hypertension 2025     Anemia in ESRD (end-stage renal disease)  ([facility]) 2025     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, on chronic dialysis ([facility]) 2025     Renal lesion 2024     Chronic wound 2024     Chronic diastolic (congestive) heart failure ([facility]) 2024     Pleural effusion 2024     Elevated troponin 2024     Fall 2024     Melena 2024     Paroxysmal atrial fibrillation ([facility])      ESRD (end stage renal disease) ([facility]) 10/25/2024     Nausea 10/20/2024     Hyponatremia 10/19/2024     Chronic kidney disease-mineral and bone disorder 10/14/2024     Falls 10/08/2024     PAD (peripheral artery disease) ([facility]) 10/08/2024     Closed fracture of right pelvis ([facility]) 2024     Bilateral sacral fracture, closed ([facility]) 2024     Hyperkalemia 2024     Difficulty with speech 2024     History of amputation of hallux ([facility]) 2024     Facial  cellulitis 03/12/2024     Constipation 09/06/2023     Cellulitis of right foot      Polymicrobial bacterial infection 08/24/2023     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 mellitus, with necrosis of bone (HCC)      Acquired deformity of foot, right      Charcot's joint      Subacute osteomyelitis of right foot (HCC)      Open wound of right foot 08/21/2023     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 mellitus, with bone involvement without evidence of necrosis (HCC)      Diabetic ulcer of left midfoot associated with type 2 diabetes mellitus, limited to breakdown of skin (HCC)      Diabetic polyneuropathy associated with type 2 diabetes mellitus (HCC)      Diabetes mellitus type 2 with peripheral artery disease (HCC)      History of amputation of lesser toe of left foot ([facility])      Onychomycosis      Status post fall 08/19/2023     Traumatic rhabdomyolysis ([facility]) 08/19/2023     Osteoarthritis of left hip 07/27/2022     Severe Left Orchalgia 07/26/2022     Right shoulder pain 07/26/2022     Left hip pain 07/06/2022     Hemodialysis status ([facility])      Hypervolemia      Diarrhea 01/22/2022     Anemia due to chronic kidney disease, on chronic dialysis (HCC) 01/21/2022     Type 2 diabetes mellitus, without long-term current use of insulin (HCC)      Hypertension      History of TIA (transient ischemic attack)      T10 vertebral fracture ([facility])        LOS (days): 1  Geometric Mean LOS (GMLOS) (days): 6.9  Days to GMLOS:5.5     OBJECTIVE:  Risk of Unplanned Readmission Score: 67.15         Current admission status: Inpatient   Preferred Pharmacy:   [facility] [address] [place] [address]  [address] [place] [id number]  Phone: [phone] Fax: [phone]    Primary Care Provider: Jeffrey Jackson    Primary Insurance: MEDICARE  Secondary Insurance:     DISCHARGE DETAILS:    CM met with pt to discuss d/c planning  Pt was evaluated by OT/PT and  recommended for IP rehab  Pt is willing to d/c to SNF but DOESN'T want [place]. Pt would like new referrals. ELOISE placed

## 2025-04-04 NOTE — PROGRESS NOTES
NEPHROLOGY HOSPITAL PROGRESS NOTE   Naresh Carpio 64 y.o. male MRN: [id number]  Unit/Bed#: [facility] 618-01 Encounter: [id number]  Reason for Consult: ESRD    Assessment & Plan  ESRD (end stage renal disease) on dialysis (HCC)  TTS at [facility] [place]  Access: Right IJ permcath  EDW: 99 kg per previous hospital records  Continue with current dialysis schedule, postdialysis weight yesterday 98.6 kg  Traumatic retroperitoneal hemorrhage  History of a fall onto a rail a few days ago. Continued to have progressive pain  Presented to [facility] ED, found to have retroperitoneal hemorrhage with areas of active contrast extravasation. Transferred to [facility]  S/p IR embolization of branch of the deep circumflex iliac artery and superior gluteal artery overnight  Management per critical care    Primary hypertension  BP currently stable  Not on any antihypertensives currently  Home regimen: Nifedipine 30 mg daily   Anemia due to chronic kidney disease, on chronic dialysis (HCC)  Previously not on SURINDER therapy  Recent hemoglobin 6.5, receiving PRBC transfusion currently  At risk for electrolyte imbalance  Recent potassium 4.2, calcium 8.9 phosphorus 4.9  Chronic diastolic (congestive) heart failure (HCC)  Echo Oct. 2024 with LVEF 60-65%  Monitor volume status   UF with HD, weight is stable.  Chronic kidney disease-mineral and bone disorder  Home regimen: Renagel 800 mg TID per chart review  Can continue inpatient, phos this am 8.3  Paroxysmal atrial fibrillation (HCC)  Per critical care  L2 vertebral fracture (HCC)      Summary:  Continue maintenance hemodialysis currently Tuesday Thursday Saturday  Weight stable  Receiving PRBC transfusion  No changes from nephrology standpoint    SUBJECTIVE / 24H INTERVAL HISTORY:  Complains of significant right-sided abdominal pain.  No reported chest pain shortness of breath.  No cramping with dialysis yesterday.    OBJECTIVE:  Current Weight: Weight - Scale: 100 kg (221 lb)  Vitals:    04/04/25 0710  04/04/25 1124 04/04/25 1154 04/04/25 1155   BP: 122/54 (!) 105/41 (!) 114/43 (!) 114/43   Pulse: 80 71 73 71   Resp: 16 16 16    Temp: 98.7 °F (37.1 °C) 98.8 °F (37.1 °C)  98.7 °F (37.1 °C)   TempSrc:       SpO2: 100% 99% 98% 100%   Weight:       Height:           Intake/Output Summary (Last 24 hours) at 4/4/2025 1402  Last data filed at 4/3/2025 1835  Gross per 24 hour   Intake 920 ml   Output 3000 ml   Net -2080 ml       Physical Exam  Constitutional:       Appearance: He is not ill-appearing.   Cardiovascular:      Rate and Rhythm: Normal rate and regular rhythm.   Pulmonary:      Effort: Pulmonary effort is normal.      Breath sounds: Normal breath sounds.   Abdominal:      General: There is distension.      Palpations: Abdomen is soft.      Tenderness: There is abdominal tenderness. There is no guarding.   Musculoskeletal:      Right lower leg: Edema present.      Left lower leg: Edema present.   Skin:     General: Skin is warm and dry.      Findings: No rash.   Neurological:      Mental Status: He is alert and oriented to person, place, and time.         Medications:    Current Facility-Administered Medications:     acetaminophen (TYLENOL) tablet 975 mg, 975 mg, Oral, Q8H KEMAL, Nallely Ayesha, PA-C, 975 mg at 04/04/25 1306    allopurinol (ZYLOPRIM) tablet 100 mg, 100 mg, Oral, Daily, Nallely Ayesha, PA-C, 100 mg at 04/04/25 0802    atorvastatin (LIPITOR) tablet 80 mg, 80 mg, Oral, Daily With Dinner, Nallely Ayesha, PA-C, 80 mg at 04/03/25 1508    chlorhexidine (PERIDEX) 0.12 % oral rinse 15 mL, 15 mL, Mouth/Throat, Q12H KEMAL, Nallely Ayesha, PA-C, 15 mL at 04/04/25 0803    gabapentin (NEURONTIN) capsule 100 mg, 100 mg, Oral, Once per day on Monday Wednesday Friday, Nallely Ayesha, PA-C, 100 mg at 04/04/25 0802    heparin (porcine) subcutaneous injection 5,000 Units, 5,000 Units, Subcutaneous, Q8H Nallely SOMMER PA-C, 5,000 Units at 04/04/25 1306    HYDROmorphone HCl (DILAUDID) injection 0.2 mg,  "0.2 mg, Intravenous, Q2H PRN, Nallely Ayesha, PA-C, 0.2 mg at 04/04/25 1306    lidocaine (LIDODERM) 5 % patch 1 patch, 1 patch, Topical, Daily PRN, Nallely Ayesha, PA-C, 1 patch at 04/04/25 0803    methocarbamol (ROBAXIN) tablet 500 mg, 500 mg, Oral, Q6H PRN, Nallely Ayesha, PA-C, 500 mg at 04/04/25 0802    naloxone (NARCAN) 0.04 mg/mL syringe 0.04 mg, 0.04 mg, Intravenous, Q1MIN PRN, Nallely Ayesha, PA-C    NIFEdipine (PROCARDIA XL) 24 hr tablet 30 mg, 30 mg, Oral, After Dinner, Nallely Ayesha, PA-C    oxyCODONE (ROXICODONE) split tablet 2.5 mg, 2.5 mg, Oral, Q4H PRN **OR** oxyCODONE (ROXICODONE) IR tablet 5 mg, 5 mg, Oral, Q4H PRN, Nallely Ayesha, PA-C, 5 mg at 04/04/25 1206    senna-docusate sodium (SENOKOT S) 8.6-50 mg per tablet 1 tablet, 1 tablet, Oral, HS, Nallely Ayesha, PA-C, 1 tablet at 04/03/25 2114    sevelamer (RENAGEL) tablet 1,600 mg, 1,600 mg, Oral, TID With Meals, Nallely Ayesha, PA-C, 1,600 mg at 04/04/25 1150    Laboratory Results:  Results from last 7 days   Lab Units 04/04/25  0628 04/03/25  1524 04/03/25  0545 04/03/25  0148 04/02/25 2024 03/29/25  0628   WBC Thousand/uL 8.27  --  8.74 9.65 9.24 7.97   HEMOGLOBIN g/dL 6.5* 7.6* 7.5* 6.5* 7.2* 9.1*   HEMATOCRIT % 20.9*  --  23.7* 20.7* 23.4* 29.3*   PLATELETS Thousands/uL 267  --  264 267 340 230   POTASSIUM mmol/L 4.2  --  5.3 5.0 5.4* 5.4*   CHLORIDE mmol/L 95*  --  94* 93* 91* 93*   CO2 mmol/L 27  --  27 22 19* 18*   BUN mg/dL 33*  --  63* 58* 55* 67*   CREATININE mg/dL 4.75*  --  7.00* 6.78* 6.60* 7.69*   CALCIUM mg/dL 8.9  --  8.5 8.2* 9.1 8.2*   MAGNESIUM mg/dL 2.2  --  2.2 2.1  --   --    PHOSPHORUS mg/dL 4.9*  --  8.3* 7.7*  --   --        Portions of the record may have been created with voice recognition software. Occasional wrong word or \"sound a like\" substitutions may have occurred due to the inherent limitations of voice recognition software. Read the chart carefully and recognize, using context, where " substitutions have occurred.If you have any questions, please contact the dictating provider.

## 2025-04-05 ENCOUNTER — APPOINTMENT (INPATIENT)
Dept: RADIOLOGY | Facility: HOSPITAL | Age: 65
DRG: 907 | End: 2025-04-05
Payer: MEDICARE

## 2025-04-05 ENCOUNTER — APPOINTMENT (INPATIENT)
Dept: DIALYSIS | Facility: HOSPITAL | Age: 65
DRG: 907 | End: 2025-04-05
Payer: MEDICARE

## 2025-04-05 PROBLEM — G89.11 ACUTE PAIN DUE TO TRAUMA: Status: ACTIVE | Noted: 2025-04-05

## 2025-04-05 LAB
ABO GROUP BLD BPU: NORMAL
ABO GROUP BLD BPU: NORMAL
ANION GAP SERPL CALCULATED.3IONS-SCNC: 11 MMOL/L (ref 4–13)
BPU ID: NORMAL
BPU ID: NORMAL
BUN SERPL-MCNC: 47 MG/DL (ref 5–25)
CALCIUM SERPL-MCNC: 9 MG/DL (ref 8.4–10.2)
CHLORIDE SERPL-SCNC: 91 MMOL/L (ref 96–108)
CO2 SERPL-SCNC: 28 MMOL/L (ref 21–32)
CREAT SERPL-MCNC: 6.42 MG/DL (ref 0.6–1.3)
CROSSMATCH: NORMAL
CROSSMATCH: NORMAL
GFR SERPL CREATININE-BSD FRML MDRD: 8 ML/MIN/1.73SQ M
GLUCOSE SERPL-MCNC: 116 MG/DL (ref 65–140)
GLUCOSE SERPL-MCNC: 138 MG/DL (ref 65–140)
GLUCOSE SERPL-MCNC: 155 MG/DL (ref 65–140)
GLUCOSE SERPL-MCNC: 156 MG/DL (ref 65–140)
HCT VFR BLD AUTO: 21.1 % (ref 36.5–49.3)
HCT VFR BLD AUTO: 22 % (ref 36.5–49.3)
HCT VFR BLD AUTO: 23.4 % (ref 36.5–49.3)
HGB BLD-MCNC: 6.9 G/DL (ref 12–17)
HGB BLD-MCNC: 7.2 G/DL (ref 12–17)
HGB BLD-MCNC: 7.7 G/DL (ref 12–17)
POTASSIUM SERPL-SCNC: 4.5 MMOL/L (ref 3.5–5.3)
SODIUM SERPL-SCNC: 130 MMOL/L (ref 135–147)
UNIT DISPENSE STATUS: NORMAL
UNIT DISPENSE STATUS: NORMAL
UNIT PRODUCT CODE: NORMAL
UNIT PRODUCT CODE: NORMAL
UNIT PRODUCT VOLUME: 350 ML
UNIT PRODUCT VOLUME: 350 ML
UNIT RH: NORMAL
UNIT RH: NORMAL

## 2025-04-05 PROCEDURE — 74174 CTA ABD&PLVS W/CONTRAST: CPT

## 2025-04-05 PROCEDURE — P9016 RBC LEUKOCYTES REDUCED: HCPCS

## 2025-04-05 PROCEDURE — 85014 HEMATOCRIT: CPT

## 2025-04-05 PROCEDURE — 85018 HEMOGLOBIN: CPT

## 2025-04-05 PROCEDURE — 85018 HEMOGLOBIN: CPT | Performed by: STUDENT IN AN ORGANIZED HEALTH CARE EDUCATION/TRAINING PROGRAM

## 2025-04-05 PROCEDURE — 90935 HEMODIALYSIS ONE EVALUATION: CPT | Performed by: INTERNAL MEDICINE

## 2025-04-05 PROCEDURE — NC001 PR NO CHARGE: Performed by: STUDENT IN AN ORGANIZED HEALTH CARE EDUCATION/TRAINING PROGRAM

## 2025-04-05 PROCEDURE — 80048 BASIC METABOLIC PNL TOTAL CA: CPT

## 2025-04-05 PROCEDURE — 82948 REAGENT STRIP/BLOOD GLUCOSE: CPT

## 2025-04-05 PROCEDURE — 85014 HEMATOCRIT: CPT | Performed by: STUDENT IN AN ORGANIZED HEALTH CARE EDUCATION/TRAINING PROGRAM

## 2025-04-05 RX ORDER — OXYCODONE HYDROCHLORIDE 5 MG/1
5 TABLET ORAL EVERY 4 HOURS PRN
Refills: 0 | Status: DISCONTINUED | OUTPATIENT
Start: 2025-04-05 | End: 2025-04-06

## 2025-04-05 RX ORDER — OXYCODONE HYDROCHLORIDE 10 MG/1
10 TABLET ORAL EVERY 4 HOURS PRN
Refills: 0 | Status: DISCONTINUED | OUTPATIENT
Start: 2025-04-05 | End: 2025-04-06

## 2025-04-05 RX ORDER — HYDROMORPHONE HCL/PF 1 MG/ML
0.5 SYRINGE (ML) INJECTION ONCE
Status: COMPLETED | OUTPATIENT
Start: 2025-04-05 | End: 2025-04-05

## 2025-04-05 RX ADMIN — ACETAMINOPHEN 975 MG: 325 TABLET, FILM COATED ORAL at 22:15

## 2025-04-05 RX ADMIN — ACETAMINOPHEN 975 MG: 325 TABLET, FILM COATED ORAL at 14:05

## 2025-04-05 RX ADMIN — OXYCODONE HYDROCHLORIDE 5 MG: 5 TABLET ORAL at 06:25

## 2025-04-05 RX ADMIN — ATORVASTATIN CALCIUM 80 MG: 80 TABLET, FILM COATED ORAL at 18:25

## 2025-04-05 RX ADMIN — NIFEDIPINE 30 MG: 30 TABLET, FILM COATED, EXTENDED RELEASE ORAL at 18:25

## 2025-04-05 RX ADMIN — SENNOSIDES AND DOCUSATE SODIUM 1 TABLET: 50; 8.6 TABLET ORAL at 22:15

## 2025-04-05 RX ADMIN — IOHEXOL 85 ML: 350 INJECTION, SOLUTION INTRAVENOUS at 16:57

## 2025-04-05 RX ADMIN — SEVELAMER HYDROCHLORIDE 1600 MG: 800 TABLET ORAL at 18:25

## 2025-04-05 RX ADMIN — LIDOCAINE 1 PATCH: 700 PATCH TOPICAL at 11:46

## 2025-04-05 RX ADMIN — ALLOPURINOL 100 MG: 100 TABLET ORAL at 11:41

## 2025-04-05 RX ADMIN — EPOETIN ALFA 2000 UNITS: 2000 SOLUTION INTRAVENOUS; SUBCUTANEOUS at 10:21

## 2025-04-05 RX ADMIN — SEVELAMER HYDROCHLORIDE 1600 MG: 800 TABLET ORAL at 11:41

## 2025-04-05 RX ADMIN — HYDROMORPHONE HYDROCHLORIDE 0.5 MG: 1 INJECTION, SOLUTION INTRAMUSCULAR; INTRAVENOUS; SUBCUTANEOUS at 16:34

## 2025-04-05 RX ADMIN — HEPARIN SODIUM 5000 UNITS: 5000 INJECTION INTRAVENOUS; SUBCUTANEOUS at 05:10

## 2025-04-05 RX ADMIN — ACETAMINOPHEN 975 MG: 325 TABLET, FILM COATED ORAL at 05:09

## 2025-04-05 RX ADMIN — EPOETIN ALFA 3000 UNITS: 3000 SOLUTION INTRAVENOUS; SUBCUTANEOUS at 10:21

## 2025-04-05 RX ADMIN — HEPARIN SODIUM 5000 UNITS: 5000 INJECTION INTRAVENOUS; SUBCUTANEOUS at 14:04

## 2025-04-05 RX ADMIN — CHLORHEXIDINE GLUCONATE 0.12% ORAL RINSE 15 ML: 1.2 LIQUID ORAL at 22:15

## 2025-04-05 RX ADMIN — OXYCODONE HYDROCHLORIDE 10 MG: 10 TABLET ORAL at 22:15

## 2025-04-05 RX ADMIN — HYDROMORPHONE HYDROCHLORIDE 0.2 MG: 0.2 INJECTION, SOLUTION INTRAMUSCULAR; INTRAVENOUS; SUBCUTANEOUS at 23:10

## 2025-04-05 RX ADMIN — HYDROMORPHONE HYDROCHLORIDE 0.2 MG: 0.2 INJECTION, SOLUTION INTRAMUSCULAR; INTRAVENOUS; SUBCUTANEOUS at 04:05

## 2025-04-05 RX ADMIN — OXYCODONE HYDROCHLORIDE 5 MG: 5 TABLET ORAL at 02:20

## 2025-04-05 RX ADMIN — SEVELAMER HYDROCHLORIDE 1600 MG: 800 TABLET ORAL at 08:01

## 2025-04-05 RX ADMIN — HYDROMORPHONE HYDROCHLORIDE 0.2 MG: 0.2 INJECTION, SOLUTION INTRAMUSCULAR; INTRAVENOUS; SUBCUTANEOUS at 08:05

## 2025-04-05 RX ADMIN — METHOCARBAMOL 500 MG: 500 TABLET ORAL at 06:25

## 2025-04-05 RX ADMIN — CHLORHEXIDINE GLUCONATE 0.12% ORAL RINSE 15 ML: 1.2 LIQUID ORAL at 08:01

## 2025-04-05 RX ADMIN — HYDROMORPHONE HYDROCHLORIDE 0.2 MG: 0.2 INJECTION, SOLUTION INTRAMUSCULAR; INTRAVENOUS; SUBCUTANEOUS at 10:03

## 2025-04-05 RX ADMIN — OXYCODONE HYDROCHLORIDE 10 MG: 10 TABLET ORAL at 11:41

## 2025-04-05 NOTE — PLAN OF CARE
"Post-Dialysis RN Treatment Note    Blood Pressure:  Pre 132/68 mm/Hg  Post 147/64 mmHg   EDW:  99 kg    Weight:  Pre 98.6 kg   Post 98.7 kg   Mode of weight measurement: Other chair scale   Volume Removed:  300 ml    Treatment duration: 175 minutes    NS given:  No    Treatment shortened Yes, describe: pt requested tx be terminated 1 hr 45\" early due to pain not relieved by Dilaudid   Medications given during Rx: Dilaudid 0.2 mg, Epogen 5000 units   Estimated Kt/V:  Not Applicable   Access type: Permacath/TDC   Needle Gauge:  n/a   Access Issues: No    Report called to primary nurse:   Yes SUSANNA Jones RN  For 4 hr HD tx, 1L UF net, 3K bath  Problem: METABOLIC, FLUID AND ELECTROLYTES - ADULT  Goal: Electrolytes maintained within normal limits  Description: INTERVENTIONS:- Monitor labs and assess patient for signs and symptoms of electrolyte imbalances- Administer electrolyte replacement as ordered- Monitor response to electrolyte replacements, including repeat lab results as appropriate- Instruct patient on fluid and nutrition as appropriate  Outcome: Progressing  Goal: Fluid balance maintained  Description: INTERVENTIONS:- Monitor labs - Monitor I/O and WT- Instruct patient on fluid and nutrition as appropriate- Assess for signs & symptoms of volume excess or deficit  Outcome: Progressing     "

## 2025-04-05 NOTE — ASSESSMENT & PLAN NOTE
- Patient maxing out current pain regiment (oxy 2.5/5, dilaudid 0.2mg q2h PRN)  - Increase oxy to 5/10  - APS consulted today

## 2025-04-05 NOTE — ASSESSMENT & PLAN NOTE
- CTA CAP 4/2: right retroperitoneal hematoma with areas of active contrast extravasation   - S/p IR embolization of branch of the R deep circumflex iliac artery and R superior gluteal artery   - On heparin for DVT ppx   - Required 1u PRBC 4/4 and 1u PRBC 4/5; Post-transfusion hemoglobin today 6.8 - > 7.2  - Recheck H/H at midnight    Yes, this sounds like a muscle spasm. Flexeril sent to pharmacy. He can als take 1-2 Aleve twice daily (with food) in addition to Tylenol, but this may make his stomach pain worse.     Flexeril can make him drowsy so he should not mix it with alcohol or take it while driving. He can also take a 1/2 tablet if he it makes him too drowsy. If he develops any new trouble holding his urine or his stool or significant leg weakness, he should go to the emergency department.

## 2025-04-05 NOTE — ASSESSMENT & PLAN NOTE
Lab Results   Component Value Date    HGBA1C 5.7 (H) 02/09/2025       Recent Labs     04/04/25  0710 04/04/25  1137 04/04/25  1632 04/05/25  0802   POCGLU 113 154* 138 138       Blood Sugar Average: Last 72 hrs:  (P) 133.625  - Not currently requiring SSI. Continue to monitor

## 2025-04-05 NOTE — PROGRESS NOTES
NEPHROLOGY HOSPITAL PROGRESS NOTE   Naresh Carpio 64 y.o. male MRN: [id number]  Unit/Bed#: [facility] 618-01 Encounter: [id number]  Reason for Consult: ESRD    Assessment & Plan  ESRD (end stage renal disease) on dialysis (HCC)  TTS at [facility] [place]  Access: Right IJ permcath  EDW: 99 kg per previous hospital records  Continue with current dialysis schedule, predialysis weight 98 6, challenging weight by 1 kg.  Traumatic retroperitoneal hemorrhage  History of a fall onto a rail a few days ago. Continued to have progressive pain  Presented to [place] ED, found to have retroperitoneal hemorrhage with areas of active contrast extravasation. Transferred to \[facility]\""  S/p IR embolization of branch of the deep circumflex iliac artery and superior gluteal artery overnight  Management per remarry service  Primary hypertension  BP currently stable  Not on any antihypertensives currently  Home regimen: Nifedipine 30 mg daily   Anemia due to chronic kidney disease, on chronic dialysis (HCC)  Starting SURINDER therapy, discussed with patient, Epogen 5000 units  Recent hemoglobin 6.5, receiving PRBC transfusion currently  At risk for electrolyte imbalance  Recent potassium 4.2, calcium 8.9 phosphorus 4.9  Chronic diastolic (congestive) heart failure (HCC)  Echo Oct. 2024 with LVEF 60-65%  Monitor volume status   UF with HD  Chronic kidney disease-mineral and bone disorder  Home regimen: Renagel 800 mg TID per chart review  Phosphorus improved to 4.9  Paroxysmal atrial fibrillation (HCC)  Per critical care  L2 vertebral fracture (HCC)  As per primary service.    Summary:  Continue with hemodialysis, is a Thursday Saturday  Challenging weight by 1 kg  Start SURINDER therapy    SUBJECTIVE / 24H INTERVAL HISTORY:  Seen and examined.  Patient currently sitting in a chair on hemodialysis.  Complains of lower back discomfort and abdominal pain appears to be tolerable.  No reported chest pain shortness of breath.    OBJECTIVE:  Current Weight: Weight -  Scale: 100 kg (221 lb)  Vitals:    04/05/25 0616 04/05/25 0644 04/05/25 0840 04/05/25 0900   BP: 142/67 140/68 132/68 122/54   BP Location:   Left arm    Pulse: 85 81 85 77   Resp: 18 18 16 16   Temp: 98.1 °F (36.7 °C) 98 °F (36.7 °C) 98.2 °F (36.8 °C)    TempSrc:   Temporal    SpO2: 94% 91%     Weight:       Height:           Intake/Output Summary (Last 24 hours) at 4/5/2025 0934  Last data filed at 4/5/2025 0840  Gross per 24 hour   Intake 1458 ml   Output 0 ml   Net 1458 ml       Physical Exam  Eyes:      General: No scleral icterus.  Cardiovascular:      Rate and Rhythm: Normal rate and regular rhythm.   Pulmonary:      Effort: Pulmonary effort is normal.      Breath sounds: Normal breath sounds.   Abdominal:      General: There is distension.      Palpations: Abdomen is soft.      Tenderness: There is no guarding.   Musculoskeletal:      Right lower leg: Edema present.      Left lower leg: Edema present.   Skin:     General: Skin is warm and dry.      Findings: No rash.   Neurological:      Mental Status: He is alert and oriented to person, place, and time.         Medications:    Current Facility-Administered Medications:     acetaminophen (TYLENOL) tablet 975 mg, 975 mg, Oral, Q8H KEMAL, Nallely Ayesha, PA-C, 975 mg at 04/05/25 0509    allopurinol (ZYLOPRIM) tablet 100 mg, 100 mg, Oral, Daily, Nallely Ayesha, PA-C, 100 mg at 04/04/25 0802    atorvastatin (LIPITOR) tablet 80 mg, 80 mg, Oral, Daily With Dinner, Nallely Ayesha, PA-C, 80 mg at 04/04/25 1611    chlorhexidine (PERIDEX) 0.12 % oral rinse 15 mL, 15 mL, Mouth/Throat, Q12H KEMAL, Nallely Ayesha, PA-C, 15 mL at 04/05/25 0801    gabapentin (NEURONTIN) capsule 100 mg, 100 mg, Oral, Once per day on Monday Wednesday Friday, Nallely Ayesha, PA-C, 100 mg at 04/04/25 0802    heparin (porcine) subcutaneous injection 5,000 Units, 5,000 Units, Subcutaneous, Q8H Nallely SOMMER PA-C, 5,000 Units at 04/05/25 0510    HYDROmorphone HCl (DILAUDID)  injection 0.2 mg, 0.2 mg, Intravenous, Q2H PRN, Nallely Ayesha, PA-C, 0.2 mg at 04/05/25 0805    lidocaine (LIDODERM) 5 % patch 1 patch, 1 patch, Topical, Daily PRN, Nallely Ayesha, PA-C, 1 patch at 04/04/25 0803    methocarbamol (ROBAXIN) tablet 500 mg, 500 mg, Oral, Q6H PRN, Nallely Ayesha, PA-C, 500 mg at 04/05/25 0625    naloxone (NARCAN) 0.04 mg/mL syringe 0.04 mg, 0.04 mg, Intravenous, Q1MIN PRN, Nallely Ayesha, PA-C    NIFEdipine (PROCARDIA XL) 24 hr tablet 30 mg, 30 mg, Oral, After Dinner, Nallely Ayesha, PA-C, 30 mg at 04/04/25 1719    oxyCODONE (ROXICODONE) split tablet 2.5 mg, 2.5 mg, Oral, Q4H PRN **OR** oxyCODONE (ROXICODONE) IR tablet 5 mg, 5 mg, Oral, Q4H PRN, Nallely Ayesha, PA-C, 5 mg at 04/05/25 0625    senna-docusate sodium (SENOKOT S) 8.6-50 mg per tablet 1 tablet, 1 tablet, Oral, HS, Nallely Ayesha, PA-C, 1 tablet at 04/04/25 2155    sevelamer (RENAGEL) tablet 1,600 mg, 1,600 mg, Oral, TID With Meals, Nallely Ayesha, PA-C, 1,600 mg at 04/05/25 0801    Laboratory Results:  Results from last 7 days   Lab Units 04/05/25  0840 04/04/25  2031 04/04/25  1857 04/04/25  0628 04/03/25  1524 04/03/25  0545 04/03/25  0148 04/02/25 2024   WBC Thousand/uL  --   --   --  8.27  --  8.74 9.65 9.24   HEMOGLOBIN g/dL 7.2* 6.8* 6.9* 6.5* 7.6* 7.5* 6.5* 7.2*   HEMATOCRIT % 22.0*  --  21.3* 20.9*  --  23.7* 20.7* 23.4*   PLATELETS Thousands/uL  --   --   --  267  --  264 267 340   POTASSIUM mmol/L 4.5  --   --  4.2  --  5.3 5.0 5.4*   CHLORIDE mmol/L 91*  --   --  95*  --  94* 93* 91*   CO2 mmol/L 28  --   --  27  --  27 22 19*   BUN mg/dL 47*  --   --  33*  --  63* 58* 55*   CREATININE mg/dL 6.42*  --   --  4.75*  --  7.00* 6.78* 6.60*   CALCIUM mg/dL 9.0  --   --  8.9  --  8.5 8.2* 9.1   MAGNESIUM mg/dL  --   --   --  2.2  --  2.2 2.1  --    PHOSPHORUS mg/dL  --   --   --  4.9*  --  8.3* 7.7*  --        Portions of the record may have been created with voice recognition software. Occasional wrong  "word or \"sound a like\" substitutions may have occurred due to the inherent limitations of voice recognition software. Read the chart carefully and recognize, using context, where substitutions have occurred.If you have any questions, please contact the dictating provider.  "

## 2025-04-05 NOTE — ASSESSMENT & PLAN NOTE
Starting SURINDER therapy, discussed with patient, Epogen 5000 units  Recent hemoglobin 6.5, receiving PRBC transfusion currently

## 2025-04-05 NOTE — PLAN OF CARE
Problem: PAIN - ADULT  Goal: Verbalizes/displays adequate comfort level or baseline comfort level  Description: Interventions:- Encourage patient to monitor pain and request assistance- Assess pain using appropriate pain scale- Administer analgesics based on type and severity of pain and evaluate response- Implement non-pharmacological measures as appropriate and evaluate response- Consider cultural and social influences on pain and pain management- Notify physician/advanced practitioner if interventions unsuccessful or patient reports new pain  Outcome: Progressing     Problem: INFECTION - ADULT  Goal: Absence or prevention of progression during hospitalization  Description: INTERVENTIONS:- Assess and monitor for signs and symptoms of infection- Monitor lab/diagnostic results- Monitor all insertion sites, i.e. indwelling lines, tubes, and drains- Monitor endotracheal if appropriate and nasal secretions for changes in amount and color- [place] appropriate cooling/warming therapies per order- Administer medications as ordered- Instruct and encourage patient and family to use good hand hygiene technique- Identify and instruct in appropriate isolation precautions for identified infection/condition  Outcome: Progressing  Goal: Absence of fever/infection during neutropenic period  Description: INTERVENTIONS:- Monitor WBC  Outcome: Progressing     Problem: SAFETY ADULT  Goal: Patient will remain free of falls  Description: INTERVENTIONS:- Educate patient/family on patient safety including physical limitations- Instruct patient to call for assistance with activity - Consult OT/PT to assist with strengthening/mobility - Keep Call bell within reach- Keep bed low and locked with side rails adjusted as appropriate- Keep care items and personal belongings within reach- Initiate and maintain comfort rounds- Make Fall Risk Sign visible to staff- Offer Toileting every 2 Hours, in advance of need- Initiate/Maintain alarm-  Obtain necessary fall risk management equipment: - Apply yellow socks and bracelet for high fall risk patients- Consider moving patient to room near nurses station  Outcome: Progressing  Goal: Maintain or return to baseline ADL function  Description: INTERVENTIONS:-  Assess patient's ability to carry out ADLs; assess patient's baseline for ADL function and identify physical deficits which impact ability to perform ADLs (bathing, care of mouth/teeth, toileting, grooming, dressing, etc.)- Assess/evaluate cause of self-care deficits - Assess range of motion- Assess patient's mobility; develop plan if impaired- Assess patient's need for assistive devices and provide as appropriate- Encourage maximum independence but intervene and supervise when necessary- Involve family in performance of ADLs- Assess for home care needs following discharge - Consider OT consult to assist with ADL evaluation and planning for discharge- Provide patient education as appropriate  Outcome: Progressing  Goal: Maintains/Returns to pre admission functional level  Description: INTERVENTIONS:- Perform AM-PAC 6 Click Basic Mobility/ Daily Activity assessment daily.- Set and communicate daily mobility goal to care team and patient/family/caregiver. - Collaborate with rehabilitation services on mobility goals if consulted- Perform Range of Motion 3 times a day.- Reposition patient every 2 hours.- Dangle patient 3 times a day- Stand patient 3 times a day- Ambulate patient 3 times a day- Out of bed to chair 3 times a day - Out of bed for meals 3 times a day- Out of bed for toileting- Record patient progress and toleration of activity level   Outcome: Progressing     Problem: DISCHARGE PLANNING  Goal: Discharge to home or other facility with appropriate resources  Description: INTERVENTIONS:- Identify barriers to discharge w/patient and caregiver- Arrange for needed discharge resources and transportation as appropriate- Identify discharge learning  needs (meds, wound care, etc.)- Arrange for interpretive services to assist at discharge as needed- Refer to Case Management Department for coordinating discharge planning if the patient needs post-hospital services based on physician/advanced practitioner order or complex needs related to functional status, cognitive ability, or social support system  Outcome: Progressing     Problem: Knowledge Deficit  Goal: Patient/family/caregiver demonstrates understanding of disease process, treatment plan, medications, and discharge instructions  Description: Complete learning assessment and assess knowledge base.Interventions:- Provide teaching at level of understanding- Provide teaching via preferred learning methods  Outcome: Progressing     Problem: Prexisting or High Potential for Compromised Skin Integrity  Goal: Skin integrity is maintained or improved  Description: INTERVENTIONS:- Identify patients at risk for skin breakdown- Assess and monitor skin integrity- Assess and monitor nutrition and hydration status- Monitor labs - Assess for incontinence - Turn and reposition patient- Assist with mobility/ambulation- Relieve pressure over bony prominences- Avoid friction and shearing- Provide appropriate hygiene as needed including keeping skin clean and dry- Evaluate need for skin moisturizer/barrier cream- Collaborate with interdisciplinary team - Patient/family teaching- Consider wound care consult   Outcome: Progressing     Problem: METABOLIC, FLUID AND ELECTROLYTES - ADULT  Goal: Electrolytes maintained within normal limits  Description: INTERVENTIONS:- Monitor labs and assess patient for signs and symptoms of electrolyte imbalances- Administer electrolyte replacement as ordered- Monitor response to electrolyte replacements, including repeat lab results as appropriate- Instruct patient on fluid and nutrition as appropriate  Outcome: Progressing  Goal: Fluid balance maintained  Description: INTERVENTIONS:- Monitor labs  - Monitor I/O and WT- Instruct patient on fluid and nutrition as appropriate- Assess for signs & symptoms of volume excess or deficit  Outcome: Progressing

## 2025-04-05 NOTE — ASSESSMENT & PLAN NOTE
Lab Results   Component Value Date    EGFR 8 04/05/2025    EGFR 11 04/04/2025    EGFR 7 04/03/2025    CREATININE 6.42 (H) 04/05/2025    CREATININE 4.75 (H) 04/04/2025    CREATININE 7.00 (H) 04/03/2025

## 2025-04-05 NOTE — PROGRESS NOTES
Progress Note - Trauma   Name: Naresh Carpio 64 y.o. male I MRN: [id number]  [facility] I Date of Admission: 4/3/2025   Date of Service: 4/5/2025 I Hospital Day: 2    Assessment & Plan  Traumatic retroperitoneal hemorrhage  - CTA CAP 4/2: right retroperitoneal hematoma with areas of active contrast extravasation   - S/p IR embolization of branch of the R deep circumflex iliac artery and R superior gluteal artery   - On heparin for DVT ppx   - Required 1u PRBC 4/4 and 1u PRBC 4/5; Post-transfusion hemoglobin today 6.8 - > 7.2  - Recheck H/H at midnight   Chronic kidney disease-mineral and bone disorder  Lab Results   Component Value Date    EGFR 8 04/05/2025    EGFR 11 04/04/2025    EGFR 7 04/03/2025    CREATININE 6.42 (H) 04/05/2025    CREATININE 4.75 (H) 04/04/2025    CREATININE 7.00 (H) 04/03/2025     Wounds, multiple  - Bilateral foot wounds; R anterior shin wound  - Wound care consulted   L2 vertebral fracture (HCC)  - CT CAP 4/2: Fracture of L2 vertebral body extending to the superior endplate; seen on scan from 3/24 as well  - PT/OT; OOB and ambulation as able   Paroxysmal atrial fibrillation (HCC)  - Home Eliquis 5mg BID and aspirin 81mg currently held   Chronic diastolic (congestive) heart failure (HCC)  Wt Readings from Last 3 Encounters:   04/03/25 100 kg (221 lb)   03/24/25 107 kg (234 lb 12.6 oz)   02/27/25 102 kg (224 lb 13.9 oz)     - Continue procardia xl 30mg   ESRD (end stage renal disease) on dialysis ([facility])  Lab Results   Component Value Date    EGFR 8 04/05/2025    EGFR 11 04/04/2025    EGFR 7 04/03/2025    CREATININE 6.42 (H) 04/05/2025    CREATININE 4.75 (H) 04/04/2025    CREATININE 7.00 (H) 04/03/2025   - On TTS dilaysis via permacath   - Dialysis today 4/5   - Sevelamer 1,600mg TID   Primary hypertension  - Continue home procardia   Type 2 diabetes mellitus, without long-term current use of insulin ([facility])  Lab Results   Component Value Date    HGBA1C 5.7 (H) 02/09/2025        Recent Labs     04/04/25  0710 04/04/25  1137 04/04/25  1632 04/05/25  0802   POCGLU 113 154* 138 138       Blood Sugar Average: Last 72 hrs:  (P) 133.625  - Not currently requiring SSI. Continue to monitor   Acute pain due to trauma  - Patient maxing out current pain regiment (oxy 2.5/5, dilaudid 0.2mg q2h PRN)  - Increase oxy to 5/10  - APS consulted today     Bowel Regimen: Ordered   VTE Prophylaxis:VTE covered by:  heparin (porcine), Subcutaneous, 5,000 Units at 04/05/25 0510         Disposition: Will remain admitted given requiring transfusions  I have discussed the above management plan in detail with the primary service.     24 Hour Events : Patient's hemoglobin 6.8 early this morning; received 1u PRBC.   Subjective : Patient continues to complain of RLQ/R flank pain same as before; still no skin changes on exam.  Also stating pain regimen inadequate; will review     Objective :  Temp:  [97.8 °F (36.6 °C)-98.8 °F (37.1 °C)] 98.2 °F (36.8 °C)  HR:  [66-85] 80  BP: (103-155)/(41-68) 136/62  Resp:  [12-18] 16  SpO2:  [91 %-100 %] 91 %  O2 Device: None (Room air)    I/O         04/03 0701  04/04 0700 04/04 0701  04/05 0700 04/05 0701  04/06 0700    P.O. 720 300 208    I.V. (mL/kg) 722.5 (7.2) 50 (0.5) 200 (2)    Blood  700     IV Piggyback 100      Total Intake(mL/kg) 1542.5 (15.4) 1050 (10.5) 408 (4.1)    Urine (mL/kg/hr) 0 (0) 0 (0)     Other 3000      Total Output 3000 0     Net -1457.5 +1050 +408                 Lines/Drains/Airways       Active Status       Name Placement date Placement time Site Days    HD Permanent Double Catheter 08/30/23  --  Internal jugular  584                  Physical Exam  Constitutional:       General: He is not in acute distress.     Appearance: He is not ill-appearing.      Comments: Out of bed in chair wearing LSO brace    HENT:      Head: Normocephalic and atraumatic.   Eyes:      Pupils: Pupils are equal, round, and reactive to light.   Cardiovascular:      Rate and  Rhythm: Normal rate and regular rhythm.   Pulmonary:      Effort: Pulmonary effort is normal.   Abdominal:      General: There is no distension.      Tenderness: There is no abdominal tenderness.      Comments: RLQ/R flank pain. No skin changes    Musculoskeletal:      Cervical back: No rigidity.      Right lower leg: Edema present.      Left lower leg: Edema present.   Skin:     General: Skin is warm.      Capillary Refill: Capillary refill takes less than 2 seconds.   Neurological:      General: No focal deficit present.      Mental Status: He is alert and oriented to person, place, and time.      Cranial Nerves: No cranial nerve deficit.               Lab Results: I have reviewed the following results:  Recent Labs     04/03/25  0148 04/03/25  0545 04/03/25  1524 04/04/25  0628 04/04/25  1857 04/05/25  0840   WBC 9.65 8.74  --  8.27  --   --    HGB 6.5* 7.5*   < > 6.5*   < > 7.2*   HCT 20.7* 23.7*  --  20.9*   < > 22.0*    264  --  267  --   --    SODIUM 132* 133*  --  133*  --  130*   K 5.0 5.3  --  4.2  --  4.5   CL 93* 94*  --  95*  --  91*   CO2 22 27  --  27  --  28   BUN 58* 63*  --  33*  --  47*   CREATININE 6.78* 7.00*  --  4.75*  --  6.42*   GLUC 154* 140  --  115  --  155*   CAIONIZED 1.05*  --   --  1.05*  --   --    MG 2.1 2.2  --  2.2  --   --    PHOS 7.7* 8.3*  --  4.9*  --   --    AST 10* 14  --   --   --   --    ALT 7 7  --   --   --   --    ALB 3.4* 3.5  --   --   --   --    TBILI 1.12* 1.14*  --   --   --   --    ALKPHOS 62 59  --   --   --   --    PTT 29  --   --   --   --   --    INR 1.51*  --   --   --   --   --    LACTICACID 0.9  --   --   --   --   --     < > = values in this interval not displayed.       Imaging Results Review: No pertinent imaging studies reviewed.  Other Study Results Review: No additional pertinent studies reviewed.

## 2025-04-05 NOTE — ASSESSMENT & PLAN NOTE
TTS at [facility] IN [place]  Access: Right IJ permcath  EDW: 99 kg per previous hospital records  Continue with current dialysis schedule, predialysis weight 98 6, challenging weight by 1 kg.

## 2025-04-05 NOTE — ASSESSMENT & PLAN NOTE
Lab Results   Component Value Date    EGFR 8 04/05/2025    EGFR 11 04/04/2025    EGFR 7 04/03/2025    CREATININE 6.42 (H) 04/05/2025    CREATININE 4.75 (H) 04/04/2025    CREATININE 7.00 (H) 04/03/2025   - On TTS dilaysis via permacath   - Dialysis today 4/5   - Sevelamer 1,600mg TID

## 2025-04-06 LAB
ABO GROUP BLD BPU: NORMAL
ANION GAP SERPL CALCULATED.3IONS-SCNC: 10 MMOL/L (ref 4–13)
BPU ID: NORMAL
BUN SERPL-MCNC: 39 MG/DL (ref 5–25)
CALCIUM SERPL-MCNC: 9.1 MG/DL (ref 8.4–10.2)
CFFMA (FUNCTIONAL FIBRINOGEN MAX AMPLITUDE): 40.5 MM (ref 15–32)
CHLORIDE SERPL-SCNC: 93 MMOL/L (ref 96–108)
CKLY30: 0.2 % (ref 0–2.6)
CKR(REACTION TIME): 5.1 MIN (ref 4.6–9.1)
CO2 SERPL-SCNC: 28 MMOL/L (ref 21–32)
CREAT SERPL-MCNC: 5.82 MG/DL (ref 0.6–1.3)
CROSSMATCH: NORMAL
CRTMA(RAPIDTEG MAX AMPLITUDE): 71.5 MM (ref 52–70)
ERYTHROCYTE [DISTWIDTH] IN BLOOD BY AUTOMATED COUNT: 17.1 % (ref 11.6–15.1)
GFR SERPL CREATININE-BSD FRML MDRD: 9 ML/MIN/1.73SQ M
GLUCOSE SERPL-MCNC: 106 MG/DL (ref 65–140)
GLUCOSE SERPL-MCNC: 107 MG/DL (ref 65–140)
GLUCOSE SERPL-MCNC: 120 MG/DL (ref 65–140)
GLUCOSE SERPL-MCNC: 122 MG/DL (ref 65–140)
GLUCOSE SERPL-MCNC: 141 MG/DL (ref 65–140)
HCT VFR BLD AUTO: 22 % (ref 36.5–49.3)
HCT VFR BLD AUTO: 22.5 % (ref 36.5–49.3)
HGB BLD-MCNC: 7.2 G/DL (ref 12–17)
HGB BLD-MCNC: 7.2 G/DL (ref 12–17)
MCH RBC QN AUTO: 28.9 PG (ref 26.8–34.3)
MCHC RBC AUTO-ENTMCNC: 32 G/DL (ref 31.4–37.4)
MCV RBC AUTO: 90 FL (ref 82–98)
PLATELET # BLD AUTO: 282 THOUSANDS/UL (ref 149–390)
PMV BLD AUTO: 9.3 FL (ref 8.9–12.7)
POTASSIUM SERPL-SCNC: 4.6 MMOL/L (ref 3.5–5.3)
RBC # BLD AUTO: 2.49 MILLION/UL (ref 3.88–5.62)
SODIUM SERPL-SCNC: 131 MMOL/L (ref 135–147)
UNIT DISPENSE STATUS: NORMAL
UNIT PRODUCT CODE: NORMAL
UNIT PRODUCT VOLUME: 350 ML
UNIT RH: NORMAL
WBC # BLD AUTO: 11.08 THOUSAND/UL (ref 4.31–10.16)

## 2025-04-06 PROCEDURE — 85018 HEMOGLOBIN: CPT

## 2025-04-06 PROCEDURE — 80048 BASIC METABOLIC PNL TOTAL CA: CPT

## 2025-04-06 PROCEDURE — 82948 REAGENT STRIP/BLOOD GLUCOSE: CPT

## 2025-04-06 PROCEDURE — 99232 SBSQ HOSP IP/OBS MODERATE 35: CPT | Performed by: SURGERY

## 2025-04-06 PROCEDURE — 85384 FIBRINOGEN ACTIVITY: CPT

## 2025-04-06 PROCEDURE — 85347 COAGULATION TIME ACTIVATED: CPT

## 2025-04-06 PROCEDURE — 85027 COMPLETE CBC AUTOMATED: CPT

## 2025-04-06 PROCEDURE — 87081 CULTURE SCREEN ONLY: CPT | Performed by: STUDENT IN AN ORGANIZED HEALTH CARE EDUCATION/TRAINING PROGRAM

## 2025-04-06 PROCEDURE — 85014 HEMATOCRIT: CPT

## 2025-04-06 PROCEDURE — 85397 CLOTTING FUNCT ACTIVITY: CPT

## 2025-04-06 PROCEDURE — 99223 1ST HOSP IP/OBS HIGH 75: CPT | Performed by: STUDENT IN AN ORGANIZED HEALTH CARE EDUCATION/TRAINING PROGRAM

## 2025-04-06 PROCEDURE — 87147 CULTURE TYPE IMMUNOLOGIC: CPT | Performed by: STUDENT IN AN ORGANIZED HEALTH CARE EDUCATION/TRAINING PROGRAM

## 2025-04-06 PROCEDURE — 85576 BLOOD PLATELET AGGREGATION: CPT

## 2025-04-06 PROCEDURE — 99232 SBSQ HOSP IP/OBS MODERATE 35: CPT | Performed by: INTERNAL MEDICINE

## 2025-04-06 RX ORDER — HYDROMORPHONE HCL/PF 1 MG/ML
0.5 SYRINGE (ML) INJECTION EVERY 2 HOUR PRN
Status: DISCONTINUED | OUTPATIENT
Start: 2025-04-06 | End: 2025-04-08

## 2025-04-06 RX ORDER — OXYCODONE HYDROCHLORIDE 10 MG/1
10 TABLET ORAL
Refills: 0 | Status: DISCONTINUED | OUTPATIENT
Start: 2025-04-06 | End: 2025-04-11 | Stop reason: HOSPADM

## 2025-04-06 RX ORDER — OXYCODONE HYDROCHLORIDE 5 MG/1
5 TABLET ORAL
Refills: 0 | Status: DISCONTINUED | OUTPATIENT
Start: 2025-04-06 | End: 2025-04-11 | Stop reason: HOSPADM

## 2025-04-06 RX ORDER — METHOCARBAMOL 750 MG/1
750 TABLET, FILM COATED ORAL EVERY 6 HOURS SCHEDULED
Status: DISCONTINUED | OUTPATIENT
Start: 2025-04-06 | End: 2025-04-11 | Stop reason: HOSPADM

## 2025-04-06 RX ADMIN — METHOCARBAMOL 750 MG: 750 TABLET ORAL at 17:03

## 2025-04-06 RX ADMIN — SEVELAMER HYDROCHLORIDE 1600 MG: 800 TABLET ORAL at 09:17

## 2025-04-06 RX ADMIN — METHOCARBAMOL 750 MG: 750 TABLET ORAL at 11:33

## 2025-04-06 RX ADMIN — METHOCARBAMOL 500 MG: 500 TABLET ORAL at 05:37

## 2025-04-06 RX ADMIN — HYDROMORPHONE HYDROCHLORIDE 0.5 MG: 1 INJECTION, SOLUTION INTRAMUSCULAR; INTRAVENOUS; SUBCUTANEOUS at 11:33

## 2025-04-06 RX ADMIN — OXYCODONE HYDROCHLORIDE 10 MG: 10 TABLET ORAL at 10:20

## 2025-04-06 RX ADMIN — CHLORHEXIDINE GLUCONATE 0.12% ORAL RINSE 15 ML: 1.2 LIQUID ORAL at 09:16

## 2025-04-06 RX ADMIN — ACETAMINOPHEN 975 MG: 325 TABLET, FILM COATED ORAL at 13:36

## 2025-04-06 RX ADMIN — ACETAMINOPHEN 975 MG: 325 TABLET, FILM COATED ORAL at 05:37

## 2025-04-06 RX ADMIN — ALLOPURINOL 100 MG: 100 TABLET ORAL at 09:17

## 2025-04-06 RX ADMIN — SEVELAMER HYDROCHLORIDE 1600 MG: 800 TABLET ORAL at 11:33

## 2025-04-06 RX ADMIN — SENNOSIDES AND DOCUSATE SODIUM 1 TABLET: 50; 8.6 TABLET ORAL at 22:10

## 2025-04-06 RX ADMIN — OXYCODONE HYDROCHLORIDE 10 MG: 10 TABLET ORAL at 05:37

## 2025-04-06 RX ADMIN — ACETAMINOPHEN 975 MG: 325 TABLET, FILM COATED ORAL at 22:10

## 2025-04-06 RX ADMIN — CHLORHEXIDINE GLUCONATE 0.12% ORAL RINSE 15 ML: 1.2 LIQUID ORAL at 22:10

## 2025-04-06 RX ADMIN — OXYCODONE HYDROCHLORIDE 10 MG: 10 TABLET ORAL at 22:10

## 2025-04-06 RX ADMIN — OXYCODONE HYDROCHLORIDE 10 MG: 10 TABLET ORAL at 15:14

## 2025-04-06 RX ADMIN — NIFEDIPINE 30 MG: 30 TABLET, FILM COATED, EXTENDED RELEASE ORAL at 17:04

## 2025-04-06 RX ADMIN — HYDROMORPHONE HYDROCHLORIDE 0.2 MG: 0.2 INJECTION, SOLUTION INTRAMUSCULAR; INTRAVENOUS; SUBCUTANEOUS at 09:20

## 2025-04-06 RX ADMIN — ATORVASTATIN CALCIUM 80 MG: 80 TABLET, FILM COATED ORAL at 17:04

## 2025-04-06 RX ADMIN — HYDROMORPHONE HYDROCHLORIDE 0.2 MG: 0.2 INJECTION, SOLUTION INTRAMUSCULAR; INTRAVENOUS; SUBCUTANEOUS at 01:14

## 2025-04-06 NOTE — ASSESSMENT & PLAN NOTE
Large retroperitoneal hematoma s/p IR embolization on 4/3  L2 vertebral fracture    Upon bedside evaluation, Naresh was in the chair. He reports moderate-severe back pain unrelieved by current MMA. PO oxycodone somewhat works but wishes for better pain control.     Plan:  Increase PO oxycodone to 5/10mg q3hr PRN for mod-severe pain  Continue home gabapentin 100mg TID  Increase IV dilaudid to 0.5mg q2hr PRN for breakthrough for time-being  Schedule PO Robaxin 750mg q6hr marcus  Continue PO Tylenol 975mg q8hr marcus, lidoderm

## 2025-04-06 NOTE — ASSESSMENT & PLAN NOTE
TTS at [facility] IN [place]  Access: Right IJ permcath  EDW: 99 kg per previous hospital records  Continue with current dialysis schedule,   Ended treatment early yesterday because of pain postdialysis weight of 98.7 kg

## 2025-04-06 NOTE — ASSESSMENT & PLAN NOTE
Lab Results   Component Value Date    HGBA1C 5.7 (H) 02/09/2025       Recent Labs     04/05/25  0802 04/05/25  1634 04/05/25 2025 04/06/25  0839   POCGLU 138 156* 116 120       Blood Sugar Average: Last 72 hrs:  (P) 132.2788691637935830

## 2025-04-06 NOTE — ASSESSMENT & PLAN NOTE
Lab Results   Component Value Date    EGFR 9 04/06/2025    EGFR 8 04/05/2025    EGFR 11 04/04/2025    CREATININE 5.82 (H) 04/06/2025    CREATININE 6.42 (H) 04/05/2025    CREATININE 4.75 (H) 04/04/2025   - On TTS dilaysis via permacath   - Dialysis today 4/5   - Sevelamer 1,600mg TID

## 2025-04-06 NOTE — ASSESSMENT & PLAN NOTE
Wt Readings from Last 3 Encounters:   04/03/25 100 kg (221 lb)   03/24/25 107 kg (234 lb 12.6 oz)   02/27/25 102 kg (224 lb 13.9 oz)

## 2025-04-06 NOTE — ASSESSMENT & PLAN NOTE
Lab Results   Component Value Date    EGFR 9 04/06/2025    EGFR 8 04/05/2025    EGFR 11 04/04/2025    CREATININE 5.82 (H) 04/06/2025    CREATININE 6.42 (H) 04/05/2025    CREATININE 4.75 (H) 04/04/2025

## 2025-04-06 NOTE — ASSESSMENT & PLAN NOTE
As per primary service.   No Repair - Repaired With Adjacent Surgical Defect Text (Leave Blank If You Do Not Want): After obtaining clear surgical margins the defect was repaired concurrently with another surgical defect which was in close approximation.

## 2025-04-06 NOTE — PROGRESS NOTES
Progress Note - Trauma   Name: Naresh Carpio 64 y.o. male I MRN: [id number]  [facility] Date of Admission: 4/3/2025   Date of Service: 4/6/2025 I Hospital Day: 3    Assessment & Plan  Traumatic retroperitoneal hemorrhage  - CTA CAP 4/2: right retroperitoneal hematoma with areas of active contrast extravasation   - S/p IR embolization of branch of the R deep circumflex iliac artery and R superior gluteal artery   - On heparin for DVT ppx   - Required 1u PRBC 4/4 and 1u PRBC 4/5; Post-transfusion hemoglobin today 6.8 - > 7.2  - Hgb this morning 7.2. TEGs ordered this morning  Chronic kidney disease-mineral and bone disorder  Lab Results   Component Value Date    EGFR 9 04/06/2025    EGFR 8 04/05/2025    EGFR 11 04/04/2025    CREATININE 5.82 (H) 04/06/2025    CREATININE 6.42 (H) 04/05/2025    CREATININE 4.75 (H) 04/04/2025     Wounds, multiple  - Bilateral foot wounds; R anterior shin wound  - Wound care consulted   L2 vertebral fracture (HCC)  - CT CAP 4/2: Fracture of L2 vertebral body extending to the superior endplate; seen on scan from 3/24 as well  - PT/OT; OOB and ambulation as able   Paroxysmal atrial fibrillation (HCC)  - Home Eliquis 5mg BID and aspirin 81mg currently held   Chronic diastolic (congestive) heart failure (HCC)  Wt Readings from Last 3 Encounters:   04/03/25 100 kg (221 lb)   03/24/25 107 kg (234 lb 12.6 oz)   02/27/25 102 kg (224 lb 13.9 oz)     - Continue procardia xl 30mg   ESRD (end stage renal disease) on dialysis (HCC)  Lab Results   Component Value Date    EGFR 9 04/06/2025    EGFR 8 04/05/2025    EGFR 11 04/04/2025    CREATININE 5.82 (H) 04/06/2025    CREATININE 6.42 (H) 04/05/2025    CREATININE 4.75 (H) 04/04/2025   - On TTS dilaysis via permacath   - Dialysis today 4/5   - Sevelamer 1,600mg TID   Primary hypertension  - Continue home procardia   Type 2 diabetes mellitus, without long-term current use of insulin ([facility])  Lab Results   Component Value Date    HGBA1C 5.7 (H)  02/09/2025       Recent Labs     04/05/25  1634 04/05/25 2025 04/06/25  0839 04/06/25  1044   POCGLU 156* 116 120 122       Blood Sugar Average: Last 72 hrs:  (P) 131.3312538853769072  - Not currently requiring SSI. Continue to monitor   Acute pain due to trauma  - Patient maxing out current pain regiment (oxy 2.5/5, dilaudid 0.2mg q2h PRN)  - Increase oxy to 5/10  - APS consulted today     Bowel Regimen: Ordered   VTE Prophylaxis:VTE covered by:    None         Disposition: Will remain admitted given requiring transfusions  I have discussed the above management plan in detail with the primary service.     24 Hour Events : Patient's hemoglobin 6.8 yesterday morning; received 1u PRBC with post-transfusion hgb 7.2. Hgb this morning 7.2.     Subjective : Patient continues to complain of RLQ/R flank pain same as before; still no skin changes on exam. Pain slightly better controlled this morning    Objective :  Temp:  [97.9 °F (36.6 °C)-100 °F (37.8 °C)] 98.6 °F (37 °C)  HR:  [70-81] 77  BP: (104-136)/(43-52) 110/45  Resp:  [17-20] 17  SpO2:  [92 %-99 %] 97 %  O2 Device: None (Room air)    I/O         04/03 0701  04/04 0700 04/04 0701  04/05 0700 04/05 0701  04/06 0700    P.O. 720 300 208    I.V. (mL/kg) 722.5 (7.2) 50 (0.5) 200 (2)    Blood  700     IV Piggyback 100      Total Intake(mL/kg) 1542.5 (15.4) 1050 (10.5) 408 (4.1)    Urine (mL/kg/hr) 0 (0) 0 (0)     Other 3000      Total Output 3000 0     Net -1457.5 +1050 +408                 Lines/Drains/Airways       Active Status       Name Placement date Placement time Site Days    HD Permanent Double Catheter 08/30/23  --  Internal jugular  585                  Physical Exam  Constitutional:       General: He is not in acute distress.     Appearance: He is not ill-appearing.      Comments: Out of bed in chair wearing LSO brace    HENT:      Head: Normocephalic and atraumatic.   Eyes:      Pupils: Pupils are equal, round, and reactive to light.   Cardiovascular:       Rate and Rhythm: Normal rate and regular rhythm.   Pulmonary:      Effort: Pulmonary effort is normal.   Abdominal:      General: There is no distension.      Tenderness: There is no abdominal tenderness.      Comments: RLQ/R flank pain. No skin changes    Musculoskeletal:      Cervical back: No rigidity.      Right lower leg: Edema present.      Left lower leg: Edema present.   Skin:     General: Skin is warm.      Capillary Refill: Capillary refill takes less than 2 seconds.   Neurological:      General: No focal deficit present.      Mental Status: He is alert and oriented to person, place, and time.      Cranial Nerves: No cranial nerve deficit.               Lab Results: I have reviewed the following results:  Recent Labs     04/04/25  0628 04/04/25  1857 04/06/25  0910   WBC 8.27  --  11.08*   HGB 6.5*   < > 7.2*   HCT 20.9*   < > 22.5*     --  282   SODIUM 133*   < > 131*   K 4.2   < > 4.6   CL 95*   < > 93*   CO2 27   < > 28   BUN 33*   < > 39*   CREATININE 4.75*   < > 5.82*   GLUC 115   < > 106   CAIONIZED 1.05*  --   --    MG 2.2  --   --    PHOS 4.9*  --   --     < > = values in this interval not displayed.       Imaging Results Review: No pertinent imaging studies reviewed.  Other Study Results Review: No additional pertinent studies reviewed.

## 2025-04-06 NOTE — ASSESSMENT & PLAN NOTE
Lab Results   Component Value Date    HGBA1C 5.7 (H) 02/09/2025       Recent Labs     04/05/25  1634 04/05/25 2025 04/06/25  0839 04/06/25  1044   POCGLU 156* 116 120 122       Blood Sugar Average: Last 72 hrs:  (P) 131.9325401797198453  - Not currently requiring SSI. Continue to monitor

## 2025-04-06 NOTE — QUICK NOTE
Contacted by service for continued drop in hemoglobin s/p right gluteal embolization for a fall. Follow up CT is reviewed showing a 5 mm inferior gluteal artery branch pseudoaneurysm.    The patient has severe back pain from a recent fracture and required general anesthesia to do the first procedure. He continues to experience significant pain requiring frequent narcotic pain control. He is not currently NPO, but is hemodynamically stable.    At this time will plan for embolization of the pseudoaneurysm tomorrow with anesthesia. Orders placed to be NPO after midnight. If the patient becomes unstable or the hematoma begins to expand, will re evaluate for more urgent embolization.

## 2025-04-06 NOTE — ASSESSMENT & PLAN NOTE
- CTA CAP 4/2: right retroperitoneal hematoma with areas of active contrast extravasation   - S/p IR embolization of branch of the R deep circumflex iliac artery and R superior gluteal artery   - On heparin for DVT ppx   - Required 1u PRBC 4/4 and 1u PRBC 4/5; Post-transfusion hemoglobin today 6.8 - > 7.2  - Hgb this morning 7.2. TEGs ordered this morning

## 2025-04-06 NOTE — CONSULTS
Consultation - Acute Pain   Name: Naresh Carpio 64 y.o. male I MRN: [id number]  [facility] I Date of Admission: 4/3/2025   Date of Service: 4/6/2025 I Hospital Day: 3   Inpatient consult to Acute Pain Service  Consult performed by: Daryl Segal MD  Consult ordered by: Lynne Carrera MD        Physician Requesting Evaluation: Randall Mcnally DO   Reason for Evaluation / Principal Problem: Post-traumatic pain control    Assessment & Plan  Traumatic retroperitoneal hemorrhage    Anemia due to chronic kidney disease, on chronic dialysis ([facility])  Lab Results   Component Value Date    EGFR 9 04/06/2025    EGFR 8 04/05/2025    EGFR 11 04/04/2025    CREATININE 5.82 (H) 04/06/2025    CREATININE 6.42 (H) 04/05/2025    CREATININE 4.75 (H) 04/04/2025     Type 2 diabetes mellitus, without long-term current use of insulin ([facility])  Lab Results   Component Value Date    HGBA1C 5.7 (H) 02/09/2025       Recent Labs     04/05/25  0802 04/05/25  1634 04/05/25 2025 04/06/25  0839   POCGLU 138 156* 116 120       Blood Sugar Average: Last 72 hrs:  (P) [id number]    Chronic kidney disease-mineral and bone disorder  Lab Results   Component Value Date    EGFR 9 04/06/2025    EGFR 8 04/05/2025    EGFR 11 04/04/2025    CREATININE 5.82 (H) 04/06/2025    CREATININE 6.42 (H) 04/05/2025    CREATININE 4.75 (H) 04/04/2025     Paroxysmal atrial fibrillation (HCC)    Chronic diastolic (congestive) heart failure (HCC)  Wt Readings from Last 3 Encounters:   04/03/25 100 kg (221 lb)   03/24/25 107 kg (234 lb 12.6 oz)   02/27/25 102 kg (224 lb 13.9 oz)             ESRD (end stage renal disease) on dialysis ([facility])  Lab Results   Component Value Date    EGFR 9 04/06/2025    EGFR 8 04/05/2025    EGFR 11 04/04/2025    CREATININE 5.82 (H) 04/06/2025    CREATININE 6.42 (H) 04/05/2025    CREATININE 4.75 (H) 04/04/2025     Primary hypertension    L2 vertebral fracture (HCC)    Secondary hyperparathyroidism (HCC)    At risk for  electrolyte imbalance    ESRD on dialysis (HCC)  Lab Results   Component Value Date    EGFR 9 04/06/2025    EGFR 8 04/05/2025    EGFR 11 04/04/2025    CREATININE 5.82 (H) 04/06/2025    CREATININE 6.42 (H) 04/05/2025    CREATININE 4.75 (H) 04/04/2025     Wounds, multiple    Acute pain due to trauma  Large retroperitoneal hematoma s/p IR embolization on 4/3  L2 vertebral fracture    Upon bedside evaluation, Naresh was in the chair. He reports moderate-severe back pain unrelieved by current MMA. PO oxycodone somewhat works but wishes for better pain control.     Plan:  Increase PO oxycodone to 5/10mg q3hr PRN for mod-severe pain  Continue home gabapentin 100mg TID  Increase IV dilaudid to 0.5mg q2hr PRN for breakthrough for time-being  Schedule PO Robaxin 750mg q6hr marcus  Continue PO Tylenol 975mg q8hr marcus, lidoderm              [facility] will continue to follow. Please contact Acute Pain Service - via SecureChat from [phone] with additional questions or concerns. See [facility] or [facility] for additional contacts and after hours information.     History of Present Illness    HPI: Naresh Carpio is a 64 y.o. year old male with PMHx of AoCD, ESRD on HD, A-fib, HFpEF who presented with a large retroperitoneal hematoma c/b L2 vertebral fracture after mechanical fall. He is s/p IR embolization of deep circumflex iliac artery/superior gluteal artery on 4/3.    Current pain location(s): Pain Score: 9  Pain Location/Orientation: Orientation: Lower, Location: Back  Pain Scale: Pain Assessment Tool: 0-10  Current Analgesic regimen:  See above    Pain History: N/A  Pain Management Physician:  N/A  I have reviewed the patient's controlled substance dispensing history in the Prescription Drug Monitoring Program in compliance with the [facility] regulations before prescribing any controlled substances.     Review of Systems   Constitutional: Negative.    HENT: Negative.     Eyes: Negative.    Respiratory: Negative.     Cardiovascular: Negative.     Gastrointestinal: Negative.    Genitourinary: Negative.    Musculoskeletal:  Positive for back pain.   Skin: Negative.    Neurological: Negative.    Psychiatric/Behavioral: Negative.       Medical History Review: I have reviewed the patient's PMH, PSH, Social History, Family History, Meds, and Allergies     Objective :  Temp:  [97.9 °F (36.6 °C)-100 °F (37.8 °C)] 98.5 °F (36.9 °C)  HR:  [70-86] 78  BP: (101-147)/(43-64) 118/44  Resp:  [16-20] 18  SpO2:  [92 %-99 %] 94 %  O2 Device: None (Room air)    Physical Exam  Vitals reviewed.   HENT:      Head: Normocephalic.      Mouth/Throat:      Mouth: Mucous membranes are dry.   Eyes:      Extraocular Movements: Extraocular movements intact.   Cardiovascular:      Rate and Rhythm: Normal rate.      Pulses: Normal pulses.   Pulmonary:      Effort: Pulmonary effort is normal.   Abdominal:      General: Abdomen is flat.   Musculoskeletal:         General: Tenderness (Back) present. Normal range of motion.      Cervical back: Normal range of motion.   Skin:     General: Skin is dry.   Neurological:      General: No focal deficit present.      Mental Status: He is alert and oriented to person, place, and time.   Psychiatric:         Mood and Affect: Mood normal.          Lab Results: I have reviewed the following results:  Estimated Creatinine Clearance: 15.7 mL/min (A) (by C-G formula based on SCr of 5.82 mg/dL (H)).  Lab Results   Component Value Date    WBC 11.08 (H) 04/06/2025    HGB 7.2 (L) 04/06/2025    HCT 22.5 (L) 04/06/2025     04/06/2025         Component Value Date/Time    K 4.6 04/06/2025 0910    CL 93 (L) 04/06/2025 0910    CO2 28 04/06/2025 0910    BUN 39 (H) 04/06/2025 0910    CREATININE 5.82 (H) 04/06/2025 0910         Component Value Date/Time    CALCIUM 9.1 04/06/2025 0910    ALKPHOS 59 04/03/2025 0545    AST 14 04/03/2025 0545    ALT 7 04/03/2025 0545    TP 6.7 04/03/2025 0545    ALB 3.5 04/03/2025 0545       Imaging Results Review: No pertinent  imaging studies reviewed.  Other Study Results Review: No additional pertinent studies reviewed.

## 2025-04-06 NOTE — PROGRESS NOTES
NEPHROLOGY HOSPITAL PROGRESS NOTE   Naresh Carpio 64 y.o. male MRN: [id number]  Unit/Bed#: [facility] 618-01 Encounter: [id number]  Reason for Consult: ESRD    Assessment & Plan  ESRD (end stage renal disease) on dialysis (HCC)  TTS at [facility] [place]  Access: Right IJ permcath  EDW: 99 kg per previous hospital records  Continue with current dialysis schedule,   Ended treatment early yesterday because of pain postdialysis weight of 98.7 kg  Traumatic retroperitoneal hemorrhage  History of a fall onto a rail a few days ago. Continued to have progressive pain  Presented to [facility] ED, found to have retroperitoneal hemorrhage with areas of active contrast extravasation. Transferred to [facility]  S/p IR embolization of branch of the deep circumflex iliac artery and superior gluteal artery overnight  Management per primary service  Primary hypertension  BP currently stable  Home regimen: Nifedipine 30 mg daily   Anemia due to chronic kidney disease, on chronic dialysis (HCC)  Start SURINDER therapy 5000 units q. HD  Recent hemoglobin stable at 7.2  May require further imaging as per primary service  At risk for electrolyte imbalance  Recent potassium 4.6, calcium 9.1 phosphorus 4.9  Chronic diastolic (congestive) heart failure (HCC)  Echo Oct. 2024 with LVEF 60-65%  Monitor volume status   UF with HD  Chronic kidney disease-mineral and bone disorder  Home regimen: Renagel 800 mg TID per chart review  Phosphorus improved to 4.9  Paroxysmal atrial fibrillation (HCC)  Per critical care  L2 vertebral fracture (HCC)  As per primary service.  Acute pain due to trauma      Summary:  Continue with maintenance hemodialysis Tuesday Thursday Saturday  Blood pressure volume status appears stable  Monitoring hemoglobin closely most recently 7.2, okay to transfuse off hemodialysis  Continue with SURINDER therapy    SUBJECTIVE / 24H INTERVAL HISTORY:  Still complains of significant back and abdominal discomfort.  No reported chest pain or shortness of breath.   Appetite is marginal.    OBJECTIVE:  Current Weight: Weight - Scale: 100 kg (221 lb)  Vitals:    04/05/25 1845 04/05/25 2255 04/06/25 0233 04/06/25 0722   BP: 127/51 (!) 136/47 (!) 104/43 (!) 118/44   BP Location:       Pulse: 76 76 70 78   Resp: 18 20 18 18   Temp: 98.4 °F (36.9 °C) 100 °F (37.8 °C) 98.9 °F (37.2 °C) 98.5 °F (36.9 °C)   TempSrc:       SpO2: 98% 97% 92% 94%   Weight:       Height:           Intake/Output Summary (Last 24 hours) at 4/6/2025 1131  Last data filed at 4/6/2025 1020  Gross per 24 hour   Intake 929 ml   Output --   Net 929 ml       Physical Exam  Constitutional:       Appearance: He is not ill-appearing.   Cardiovascular:      Rate and Rhythm: Normal rate and regular rhythm.   Pulmonary:      Effort: Pulmonary effort is normal.      Breath sounds: Normal breath sounds.   Abdominal:      General: There is no distension.      Palpations: Abdomen is soft.   Musculoskeletal:      Right lower leg: No edema.      Left lower leg: No edema.   Skin:     General: Skin is warm and dry.      Findings: No rash.   Neurological:      Mental Status: He is alert and oriented to person, place, and time.         Medications:    Current Facility-Administered Medications:     acetaminophen (TYLENOL) tablet 975 mg, 975 mg, Oral, Q8H KEMAL, Nallley Ayesha, PA-C, 975 mg at 04/06/25 0537    allopurinol (ZYLOPRIM) tablet 100 mg, 100 mg, Oral, Daily, Nallely Ayesha, PA-C, 100 mg at 04/06/25 0917    atorvastatin (LIPITOR) tablet 80 mg, 80 mg, Oral, Daily With Dinner, Nallely Ayesha, PA-C, 80 mg at 04/05/25 1825    chlorhexidine (PERIDEX) 0.12 % oral rinse 15 mL, 15 mL, Mouth/Throat, Q12H KEMAL, Nallely Ayesha, PA-C, 15 mL at 04/06/25 0916    epoetin jessica (EPOGEN,PROCRIT) injection 3,000 Units, 3,000 Units, Intravenous, After Dialysis, 3,000 Units at 04/05/25 1021 **AND** epoetin jessica (EPOGEN,PROCRIT) injection 2,000 Units, 2,000 Units, Intravenous, After Dialysis, Anthony Mixon DO, 2,000 Units at  04/05/25 1021    gabapentin (NEURONTIN) capsule 100 mg, 100 mg, Oral, Once per day on Monday Wednesday Friday, Nallely Hodge PA-C, 100 mg at 04/04/25 0802    HYDROmorphone (DILAUDID) injection 0.5 mg, 0.5 mg, Intravenous, Q2H PRN, Daryl Segal MD    lidocaine (LIDODERM) 5 % patch 1 patch, 1 patch, Topical, Daily PRN, Nallely Aldanaergan PA-C, 1 patch at 04/05/25 1146    methocarbamol (ROBAXIN) tablet 750 mg, 750 mg, Oral, Q6H KEMAL, Daryl Segal MD    naloxone (NARCAN) 0.04 mg/mL syringe 0.04 mg, 0.04 mg, Intravenous, Q1MIN PRN, Nallely Hodge PA-C    NIFEdipine (PROCARDIA XL) 24 hr tablet 30 mg, 30 mg, Oral, After Dinner, Nallely Hodge PA-C, 30 mg at 04/05/25 1825    oxyCODONE (ROXICODONE) IR tablet 5 mg, 5 mg, Oral, Q3H PRN **OR** oxyCODONE (ROXICODONE) immediate release tablet 10 mg, 10 mg, Oral, Q3H PRN, Daryl Segal MD, 10 mg at 04/06/25 1020    senna-docusate sodium (SENOKOT S) 8.6-50 mg per tablet 1 tablet, 1 tablet, Oral, HS, Nallely Hodge PA-C, 1 tablet at 04/05/25 2215    sevelamer (RENAGEL) tablet 1,600 mg, 1,600 mg, Oral, TID With Meals, Nallely Aldanaergan, PA-C, 1,600 mg at 04/06/25 0917    Laboratory Results:  Results from last 7 days   Lab Units 04/06/25  0910 04/06/25  0109 04/05/25  1947 04/05/25  1410 04/05/25  0840 04/04/25  2031 04/04/25  1857 04/04/25  0628 04/03/25  1524 04/03/25  0545 04/03/25  0148 04/02/25 2024   WBC Thousand/uL 11.08*  --   --   --   --   --   --  8.27  --  8.74 9.65 9.24   HEMOGLOBIN g/dL 7.2* 7.2* 7.7* 6.9* 7.2* 6.8* 6.9* 6.5*   < > 7.5* 6.5* 7.2*   HEMATOCRIT % 22.5* 22.0* 23.4* 21.1* 22.0*  --  21.3* 20.9*  --  23.7* 20.7* 23.4*   PLATELETS Thousands/uL 282  --   --   --   --   --   --  267  --  264 267 340   POTASSIUM mmol/L 4.6  --   --   --  4.5  --   --  4.2  --  5.3 5.0 5.4*   CHLORIDE mmol/L 93*  --   --   --  91*  --   --  95*  --  94* 93* 91*   CO2 mmol/L 28  --   --   --  28  --   --  27  --  27 22 19*   BUN mg/dL 39*  --   --   " --  47*  --   --  33*  --  63* 58* 55*   CREATININE mg/dL 5.82*  --   --   --  6.42*  --   --  4.75*  --  7.00* 6.78* 6.60*   CALCIUM mg/dL 9.1  --   --   --  9.0  --   --  8.9  --  8.5 8.2* 9.1   MAGNESIUM mg/dL  --   --   --   --   --   --   --  2.2  --  2.2 2.1  --    PHOSPHORUS mg/dL  --   --   --   --   --   --   --  4.9*  --  8.3* 7.7*  --     < > = values in this interval not displayed.       Portions of the record may have been created with voice recognition software. Occasional wrong word or \"sound a like\" substitutions may have occurred due to the inherent limitations of voice recognition software. Read the chart carefully and recognize, using context, where substitutions have occurred.If you have any questions, please contact the dictating provider.  "

## 2025-04-06 NOTE — ASSESSMENT & PLAN NOTE
Start SURINDER therapy 5000 units q. HD  Recent hemoglobin stable at 7.2  May require further imaging as per primary service

## 2025-04-07 ENCOUNTER — ANESTHESIA (INPATIENT)
Dept: RADIOLOGY | Facility: HOSPITAL | Age: 65
DRG: 907 | End: 2025-04-07
Payer: MEDICARE

## 2025-04-07 ENCOUNTER — ANESTHESIA EVENT (INPATIENT)
Dept: RADIOLOGY | Facility: HOSPITAL | Age: 65
DRG: 907 | End: 2025-04-07
Payer: MEDICARE

## 2025-04-07 ENCOUNTER — APPOINTMENT (INPATIENT)
Dept: RADIOLOGY | Facility: HOSPITAL | Age: 65
DRG: 907 | End: 2025-04-07
Attending: RADIOLOGY
Payer: MEDICARE

## 2025-04-07 LAB
ABO GROUP BLD BPU: NORMAL
ABO GROUP BLD: NORMAL
ANION GAP SERPL CALCULATED.3IONS-SCNC: 11 MMOL/L (ref 4–13)
BASOPHILS # BLD AUTO: 0.04 THOUSANDS/ÂΜL (ref 0–0.1)
BASOPHILS NFR BLD AUTO: 0 % (ref 0–1)
BLD GP AB SCN SERPL QL: NEGATIVE
BPU ID: NORMAL
BUN SERPL-MCNC: 49 MG/DL (ref 5–25)
CALCIUM SERPL-MCNC: 8.9 MG/DL (ref 8.4–10.2)
CHLORIDE SERPL-SCNC: 93 MMOL/L (ref 96–108)
CO2 SERPL-SCNC: 25 MMOL/L (ref 21–32)
CREAT SERPL-MCNC: 7.1 MG/DL (ref 0.6–1.3)
CROSSMATCH: NORMAL
EOSINOPHIL # BLD AUTO: 0.19 THOUSAND/ÂΜL (ref 0–0.61)
EOSINOPHIL NFR BLD AUTO: 2 % (ref 0–6)
ERYTHROCYTE [DISTWIDTH] IN BLOOD BY AUTOMATED COUNT: 17.2 % (ref 11.6–15.1)
GFR SERPL CREATININE-BSD FRML MDRD: 7 ML/MIN/1.73SQ M
GLUCOSE SERPL-MCNC: 108 MG/DL (ref 65–140)
GLUCOSE SERPL-MCNC: 84 MG/DL (ref 65–140)
GLUCOSE SERPL-MCNC: 92 MG/DL (ref 65–140)
GLUCOSE SERPL-MCNC: 93 MG/DL (ref 65–140)
GLUCOSE SERPL-MCNC: 95 MG/DL (ref 65–140)
HCT VFR BLD AUTO: 21.4 % (ref 36.5–49.3)
HCT VFR BLD AUTO: 21.4 % (ref 36.5–49.3)
HGB BLD-MCNC: 6.5 G/DL (ref 12–17)
HGB BLD-MCNC: 7 G/DL (ref 12–17)
IMM GRANULOCYTES # BLD AUTO: 0.15 THOUSAND/UL (ref 0–0.2)
IMM GRANULOCYTES NFR BLD AUTO: 1 % (ref 0–2)
INR PPP: 1.2 (ref 0.85–1.19)
LYMPHOCYTES # BLD AUTO: 0.91 THOUSANDS/ÂΜL (ref 0.6–4.47)
LYMPHOCYTES NFR BLD AUTO: 8 % (ref 14–44)
MCH RBC QN AUTO: 28.5 PG (ref 26.8–34.3)
MCHC RBC AUTO-ENTMCNC: 30.4 G/DL (ref 31.4–37.4)
MCV RBC AUTO: 94 FL (ref 82–98)
MONOCYTES # BLD AUTO: 1.3 THOUSAND/ÂΜL (ref 0.17–1.22)
MONOCYTES NFR BLD AUTO: 11 % (ref 4–12)
MRSA NOSE QL CULT: ABNORMAL
MRSA NOSE QL CULT: ABNORMAL
NEUTROPHILS # BLD AUTO: 8.78 THOUSANDS/ÂΜL (ref 1.85–7.62)
NEUTS SEG NFR BLD AUTO: 78 % (ref 43–75)
NRBC BLD AUTO-RTO: 0 /100 WBCS
PLATELET # BLD AUTO: 312 THOUSANDS/UL (ref 149–390)
PMV BLD AUTO: 9.4 FL (ref 8.9–12.7)
POTASSIUM SERPL-SCNC: 5 MMOL/L (ref 3.5–5.3)
PROTHROMBIN TIME: 15.4 SECONDS (ref 12.3–15)
RBC # BLD AUTO: 2.28 MILLION/UL (ref 3.88–5.62)
RH BLD: POSITIVE
SODIUM SERPL-SCNC: 129 MMOL/L (ref 135–147)
SPECIMEN EXPIRATION DATE: NORMAL
UNIT DISPENSE STATUS: NORMAL
UNIT PRODUCT CODE: NORMAL
UNIT PRODUCT VOLUME: 350 ML
UNIT RH: NORMAL
WBC # BLD AUTO: 11.37 THOUSAND/UL (ref 4.31–10.16)

## 2025-04-07 PROCEDURE — 80048 BASIC METABOLIC PNL TOTAL CA: CPT

## 2025-04-07 PROCEDURE — 86923 COMPATIBILITY TEST ELECTRIC: CPT

## 2025-04-07 PROCEDURE — 76937 US GUIDE VASCULAR ACCESS: CPT | Performed by: INTERNAL MEDICINE

## 2025-04-07 PROCEDURE — 85014 HEMATOCRIT: CPT

## 2025-04-07 PROCEDURE — C1894 INTRO/SHEATH, NON-LASER: HCPCS

## 2025-04-07 PROCEDURE — 04LF3DZ OCCLUSION OF LEFT INTERNAL ILIAC ARTERY WITH INTRALUMINAL DEVICE, PERCUTANEOUS APPROACH: ICD-10-PCS | Performed by: INTERNAL MEDICINE

## 2025-04-07 PROCEDURE — 82948 REAGENT STRIP/BLOOD GLUCOSE: CPT

## 2025-04-07 PROCEDURE — 36247 INS CATH ABD/L-EXT ART 3RD: CPT | Performed by: INTERNAL MEDICINE

## 2025-04-07 PROCEDURE — C1769 GUIDE WIRE: HCPCS

## 2025-04-07 PROCEDURE — 86901 BLOOD TYPING SEROLOGIC RH(D): CPT | Performed by: PHYSICIAN ASSISTANT

## 2025-04-07 PROCEDURE — C1760 CLOSURE DEV, VASC: HCPCS

## 2025-04-07 PROCEDURE — 99232 SBSQ HOSP IP/OBS MODERATE 35: CPT | Performed by: INTERNAL MEDICINE

## 2025-04-07 PROCEDURE — C1887 CATHETER, GUIDING: HCPCS

## 2025-04-07 PROCEDURE — 85018 HEMOGLOBIN: CPT

## 2025-04-07 PROCEDURE — 93005 ELECTROCARDIOGRAM TRACING: CPT

## 2025-04-07 PROCEDURE — 76937 US GUIDE VASCULAR ACCESS: CPT

## 2025-04-07 PROCEDURE — 85610 PROTHROMBIN TIME: CPT | Performed by: INTERNAL MEDICINE

## 2025-04-07 PROCEDURE — 37244 VASC EMBOLIZE/OCCLUDE BLEED: CPT

## 2025-04-07 PROCEDURE — 37242 VASC EMBOLIZE/OCCLUDE ARTERY: CPT | Performed by: INTERNAL MEDICINE

## 2025-04-07 PROCEDURE — 99233 SBSQ HOSP IP/OBS HIGH 50: CPT | Performed by: PHYSICIAN ASSISTANT

## 2025-04-07 PROCEDURE — 85025 COMPLETE CBC W/AUTO DIFF WBC: CPT

## 2025-04-07 PROCEDURE — 86850 RBC ANTIBODY SCREEN: CPT | Performed by: PHYSICIAN ASSISTANT

## 2025-04-07 PROCEDURE — 86900 BLOOD TYPING SEROLOGIC ABO: CPT | Performed by: PHYSICIAN ASSISTANT

## 2025-04-07 PROCEDURE — P9016 RBC LEUKOCYTES REDUCED: HCPCS

## 2025-04-07 RX ORDER — LIDOCAINE HYDROCHLORIDE 10 MG/ML
INJECTION, SOLUTION EPIDURAL; INFILTRATION; INTRACAUDAL; PERINEURAL AS NEEDED
Status: DISCONTINUED | OUTPATIENT
Start: 2025-04-07 | End: 2025-04-07

## 2025-04-07 RX ORDER — LIDOCAINE WITH 8.4% SOD BICARB 0.9%(10ML)
SYRINGE (ML) INJECTION AS NEEDED
Status: COMPLETED | OUTPATIENT
Start: 2025-04-07 | End: 2025-04-07

## 2025-04-07 RX ORDER — ONDANSETRON 2 MG/ML
INJECTION INTRAMUSCULAR; INTRAVENOUS AS NEEDED
Status: DISCONTINUED | OUTPATIENT
Start: 2025-04-07 | End: 2025-04-07

## 2025-04-07 RX ORDER — SODIUM CHLORIDE 9 MG/ML
INJECTION, SOLUTION INTRAVENOUS CONTINUOUS PRN
Status: DISCONTINUED | OUTPATIENT
Start: 2025-04-07 | End: 2025-04-07

## 2025-04-07 RX ORDER — HYDROMORPHONE HCL IN WATER/PF 6 MG/30 ML
0.2 PATIENT CONTROLLED ANALGESIA SYRINGE INTRAVENOUS
Status: DISCONTINUED | OUTPATIENT
Start: 2025-04-07 | End: 2025-04-07 | Stop reason: HOSPADM

## 2025-04-07 RX ORDER — FENTANYL CITRATE/PF 50 MCG/ML
25 SYRINGE (ML) INJECTION
Status: DISCONTINUED | OUTPATIENT
Start: 2025-04-07 | End: 2025-04-07 | Stop reason: HOSPADM

## 2025-04-07 RX ORDER — NIFEDIPINE 30 MG/1
30 TABLET, EXTENDED RELEASE ORAL
Status: DISCONTINUED | OUTPATIENT
Start: 2025-04-07 | End: 2025-04-11 | Stop reason: HOSPADM

## 2025-04-07 RX ORDER — PROPOFOL 10 MG/ML
INJECTION, EMULSION INTRAVENOUS AS NEEDED
Status: DISCONTINUED | OUTPATIENT
Start: 2025-04-07 | End: 2025-04-07

## 2025-04-07 RX ORDER — FENTANYL CITRATE 50 UG/ML
INJECTION, SOLUTION INTRAMUSCULAR; INTRAVENOUS AS NEEDED
Status: DISCONTINUED | OUTPATIENT
Start: 2025-04-07 | End: 2025-04-07

## 2025-04-07 RX ORDER — ONDANSETRON 2 MG/ML
4 INJECTION INTRAMUSCULAR; INTRAVENOUS ONCE AS NEEDED
Status: DISCONTINUED | OUTPATIENT
Start: 2025-04-07 | End: 2025-04-07 | Stop reason: HOSPADM

## 2025-04-07 RX ORDER — IODIXANOL 320 MG/ML
50 INJECTION, SOLUTION INTRAVASCULAR
Status: COMPLETED | OUTPATIENT
Start: 2025-04-07 | End: 2025-04-07

## 2025-04-07 RX ADMIN — SEVELAMER HYDROCHLORIDE 1600 MG: 800 TABLET ORAL at 08:21

## 2025-04-07 RX ADMIN — ALLOPURINOL 100 MG: 100 TABLET ORAL at 08:22

## 2025-04-07 RX ADMIN — SODIUM CHLORIDE: 9 INJECTION, SOLUTION INTRAVENOUS at 11:18

## 2025-04-07 RX ADMIN — FENTANYL CITRATE 100 MCG: 50 INJECTION INTRAMUSCULAR; INTRAVENOUS at 11:44

## 2025-04-07 RX ADMIN — FENTANYL CITRATE 25 MCG: 50 INJECTION INTRAMUSCULAR; INTRAVENOUS at 13:17

## 2025-04-07 RX ADMIN — ATORVASTATIN CALCIUM 80 MG: 80 TABLET, FILM COATED ORAL at 17:26

## 2025-04-07 RX ADMIN — METHOCARBAMOL 750 MG: 750 TABLET ORAL at 17:26

## 2025-04-07 RX ADMIN — OXYCODONE HYDROCHLORIDE 10 MG: 10 TABLET ORAL at 04:16

## 2025-04-07 RX ADMIN — FENTANYL CITRATE 25 MCG: 50 INJECTION INTRAMUSCULAR; INTRAVENOUS at 13:11

## 2025-04-07 RX ADMIN — CHLORHEXIDINE GLUCONATE 0.12% ORAL RINSE 15 ML: 1.2 LIQUID ORAL at 08:22

## 2025-04-07 RX ADMIN — OXYCODONE HYDROCHLORIDE 10 MG: 10 TABLET ORAL at 08:22

## 2025-04-07 RX ADMIN — CHLORHEXIDINE GLUCONATE 0.12% ORAL RINSE 15 ML: 1.2 LIQUID ORAL at 21:59

## 2025-04-07 RX ADMIN — ACETAMINOPHEN 975 MG: 325 TABLET, FILM COATED ORAL at 21:55

## 2025-04-07 RX ADMIN — METHOCARBAMOL 750 MG: 750 TABLET ORAL at 23:00

## 2025-04-07 RX ADMIN — PROPOFOL 140 MG: 10 INJECTION, EMULSION INTRAVENOUS at 11:44

## 2025-04-07 RX ADMIN — Medication 10 ML: at 12:15

## 2025-04-07 RX ADMIN — ACETAMINOPHEN 975 MG: 325 TABLET, FILM COATED ORAL at 05:24

## 2025-04-07 RX ADMIN — HYDROMORPHONE HYDROCHLORIDE 0.5 MG: 1 INJECTION, SOLUTION INTRAMUSCULAR; INTRAVENOUS; SUBCUTANEOUS at 16:44

## 2025-04-07 RX ADMIN — HYDROMORPHONE HYDROCHLORIDE 0.5 MG: 1 INJECTION, SOLUTION INTRAMUSCULAR; INTRAVENOUS; SUBCUTANEOUS at 23:00

## 2025-04-07 RX ADMIN — METHOCARBAMOL 750 MG: 750 TABLET ORAL at 05:24

## 2025-04-07 RX ADMIN — OXYCODONE HYDROCHLORIDE 10 MG: 10 TABLET ORAL at 14:09

## 2025-04-07 RX ADMIN — SEVELAMER HYDROCHLORIDE 1600 MG: 800 TABLET ORAL at 17:26

## 2025-04-07 RX ADMIN — HYDROMORPHONE HYDROCHLORIDE 0.2 MG: 0.2 INJECTION, SOLUTION INTRAMUSCULAR; INTRAVENOUS; SUBCUTANEOUS at 13:20

## 2025-04-07 RX ADMIN — HYDROMORPHONE HYDROCHLORIDE 0.2 MG: 0.2 INJECTION, SOLUTION INTRAMUSCULAR; INTRAVENOUS; SUBCUTANEOUS at 13:27

## 2025-04-07 RX ADMIN — OXYCODONE HYDROCHLORIDE 10 MG: 10 TABLET ORAL at 20:39

## 2025-04-07 RX ADMIN — ONDANSETRON 4 MG: 2 INJECTION INTRAMUSCULAR; INTRAVENOUS at 12:44

## 2025-04-07 RX ADMIN — GABAPENTIN 100 MG: 100 CAPSULE ORAL at 08:22

## 2025-04-07 RX ADMIN — IODIXANOL 50 ML: 320 INJECTION, SOLUTION INTRAVASCULAR at 13:38

## 2025-04-07 RX ADMIN — FENTANYL CITRATE 25 MCG: 50 INJECTION INTRAMUSCULAR; INTRAVENOUS at 13:14

## 2025-04-07 RX ADMIN — METHOCARBAMOL 750 MG: 750 TABLET ORAL at 01:07

## 2025-04-07 RX ADMIN — SENNOSIDES AND DOCUSATE SODIUM 1 TABLET: 50; 8.6 TABLET ORAL at 21:55

## 2025-04-07 RX ADMIN — ACETAMINOPHEN 975 MG: 325 TABLET, FILM COATED ORAL at 14:09

## 2025-04-07 RX ADMIN — LIDOCAINE HYDROCHLORIDE 50 MG: 10 INJECTION, SOLUTION EPIDURAL; INFILTRATION; INTRACAUDAL; PERINEURAL at 11:44

## 2025-04-07 NOTE — CASE MANAGEMENT
Case Management Discharge Planning Note    Patient name Naresh Carpio  Location [facility]-01 MRN [id number]  : 1960 Date 2025       Current Admission Date: 4/3/2025  Current Admission Diagnosis:Traumatic retroperitoneal hemorrhage   Patient Active Problem List    Diagnosis Date Noted Date Diagnosed    Acute pain due to trauma 2025     ESRD on dialysis ([facility]) 2025     Wounds, multiple 2025     Traumatic retroperitoneal hemorrhage 2025     Secondary hyperparathyroidism ([facility]) 2025     At risk for electrolyte imbalance 2025     Multiple open wounds of foot 2025     Disorder of acid-base balance 2025     L2 vertebral fracture ([facility]) 2025     Non-compliance with treatment 2025     Generalized weakness 2025     Gout 2025     Closed fracture of proximal end of left humerus with routine healing 02/10/2025     Left shoulder pain 2025     ESRD (end stage renal disease) on dialysis ([facility]) 2025     Primary hypertension 2025     Anemia in ESRD (end-stage renal disease)  ([facility]) 2025     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, on chronic dialysis ([facility]) 2025     Renal lesion 2024     Chronic wound 2024     Chronic diastolic (congestive) heart failure ([facility]) 2024     Pleural effusion 2024     Elevated troponin 2024     Fall 2024     Melena 2024     Paroxysmal atrial fibrillation ([facility])      ESRD (end stage renal disease) ([facility]) 10/25/2024     Nausea 10/20/2024     Hyponatremia 10/19/2024     Chronic kidney disease-mineral and bone disorder 10/14/2024     Falls 10/08/2024     PAD (peripheral artery disease) ([facility]) 10/08/2024     Closed fracture of right pelvis ([facility]) 2024     Bilateral sacral fracture, closed ([facility]) 2024     Hyperkalemia 2024     Difficulty with speech 2024     History of amputation of  hallux (HCC) 03/18/2024     Facial cellulitis 03/12/2024     Constipation 09/06/2023     Cellulitis of right foot      Polymicrobial bacterial infection 08/24/2023     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 mellitus, with necrosis of bone (HCC)      Acquired deformity of foot, right      Charcot's joint      Subacute osteomyelitis of right foot ([facility])      Open wound of right foot 08/21/2023     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 mellitus, with bone involvement without evidence of necrosis (HCC)      Diabetic ulcer of left midfoot associated with type 2 diabetes mellitus, limited to breakdown of skin (HCC)      Diabetic polyneuropathy associated with type 2 diabetes mellitus (HCC)      Diabetes mellitus type 2 with peripheral artery disease ([facility])      History of amputation of lesser toe of left foot ([facility])      Onychomycosis      Status post fall 08/19/2023     Traumatic rhabdomyolysis ([facility]) 08/19/2023     Osteoarthritis of left hip 07/27/2022     Severe Left Orchalgia 07/26/2022     Right shoulder pain 07/26/2022     Left hip pain 07/06/2022     Hemodialysis status ([facility])      Hypervolemia      Diarrhea 01/22/2022     Anemia due to chronic kidney disease, on chronic dialysis (HCC) 01/21/2022     Type 2 diabetes mellitus, without long-term current use of insulin (HCC)      Hypertension      History of TIA (transient ischemic attack)      T10 vertebral fracture ([facility])        LOS (days): 4  Geometric Mean LOS (GMLOS) (days): 6.9  Days to GMLOS:2.3     OBJECTIVE:  Risk of Unplanned Readmission Score: 66.18         Current admission status: Inpatient   Preferred Pharmacy:   [id number] [address] [place] [address]  [address] [address]  Phone: [phone] Fax: [phone]    Primary Care Provider: Jeffrey Jackson    Primary Insurance: MEDICARE  Secondary Insurance:     DISCHARGE DETAILS:    CM spoke to [facility] [phone]  They can accept the pt for IP rehab, should he be amenable. CM will follow up with patient

## 2025-04-07 NOTE — ASSESSMENT & PLAN NOTE
Start SURINDER therapy 5000 units q. HD  Recent hemoglobin stable at 6.5 - type and screen sent. Order placed for 1 PRBC  May require further imaging as per primary service

## 2025-04-07 NOTE — PROGRESS NOTES
Progress Note - Acute Pain   Name: Naresh Carpio 64 y.o. male I MRN: [id number]  Unit/Bed#: [facility] 618-01 I Date of Admission: 4/3/2025   Date of Service: 4/7/2025 I Hospital Day: 4    Assessment & Plan  ESRD (end stage renal disease) on dialysis (HCC)  Lab Results   Component Value Date    EGFR 7 04/07/2025    EGFR 9 04/06/2025    EGFR 8 04/05/2025    CREATININE 7.10 (H) 04/07/2025    CREATININE 5.82 (H) 04/06/2025    CREATININE 6.42 (H) 04/05/2025     Acute pain due to trauma  Large retroperitoneal hematoma s/p IR embolization on 4/3  L2 vertebral fracture      Plan:  Acetaminophen 975 mg p.o. every 8 hours scheduled.  Gabapentin 100 mg p.o. 3 times weekly.  Methocarbamol 750 mg p.o. every 6 hours scheduled.  Lidocaine patch, on for 12 hours and off for 12 hours as needed for mild local pain.  Oxycodone 5 mg p.o. every 3 hours as needed moderate pain or 10 mg p.o. every 3 hours as needed severe pain.  Hydromorphone 0.5 mg IV every 2 hours as needed breakthrough pain.  Bowel regimen to avoid opioid-induced constipation while on opioid pain medication.  As needed Narcan in the event that opioid reversal becomes necessary.  Ice to painful area for up to 20 minutes every hour as needed.          APS will continue to follow. Please contact Acute Pain Service - via SecureChat from [phone] with additional questions or concerns. See [employer] or [employer] for additional contacts and after hours information.     Subjective   Naresh Carpio is a 64 y.o. male  with PMHx of AoCD, ESRD on HD, A-fib, HFpEF who presented with a large retroperitoneal hematoma c/b L2 vertebral fracture after mechanical fall. He is s/p IR embolization of deep circumflex iliac artery/superior gluteal artery on 4/3.     Pain History  Current pain location(s):  Pain Score: 8  Pain Location/Orientation: Location: Abdomen  Pain Scale: Pain Assessment Tool: 0-10  24 hour history: Patient sitting up in chair.  States he is uncomfortable but feels the  current pain regimen has been effective.  Patient is pending repeat interventional radiology procedure today to evaluate for ongoing/recurrent bleeding.    Opioid requirement previous 24 hours: Oxycodone 10 mg p.o. x 5, oxycodone 0.5 mg IV x 1.  Meds/Allergies   all current active meds have been reviewed, current meds:   Current Facility-Administered Medications:     acetaminophen (TYLENOL) tablet 975 mg, Q8H KEMAL    allopurinol (ZYLOPRIM) tablet 100 mg, Daily    atorvastatin (LIPITOR) tablet 80 mg, Daily With Dinner    chlorhexidine (PERIDEX) 0.12 % oral rinse 15 mL, Q12H KEMAL    epoetin jessica (EPOGEN,PROCRIT) injection 3,000 Units, After Dialysis **AND** epoetin jessica (EPOGEN,PROCRIT) injection 2,000 Units, After Dialysis    gabapentin (NEURONTIN) capsule 100 mg, Once per day on Monday Wednesday Friday    HYDROmorphone (DILAUDID) injection 0.5 mg, Q2H PRN    lidocaine (LIDODERM) 5 % patch 1 patch, Daily PRN    methocarbamol (ROBAXIN) tablet 750 mg, Q6H KEMAL    naloxone (NARCAN) 0.04 mg/mL syringe 0.04 mg, Q1MIN PRN    NIFEdipine (PROCARDIA XL) 24 hr tablet 30 mg, After Dinner    oxyCODONE (ROXICODONE) IR tablet 5 mg, Q3H PRN **OR** oxyCODONE (ROXICODONE) immediate release tablet 10 mg, Q3H PRN    senna-docusate sodium (SENOKOT S) 8.6-50 mg per tablet 1 tablet, HS    sevelamer (RENAGEL) tablet 1,600 mg, TID With Meals    Facility-Administered Medications Ordered in Other Encounters:     fentaNYL injection, PRN    lidocaine (PF) (XYLOCAINE-MPF) 1 % injection, PRN    phenylephrine bolus from bag, PRN    propofol (DIPRIVAN) 200 MG/20ML bolus injection, PRN, and PTA meds:   Prior to Admission Medications   Prescriptions Last Dose Informant Patient Reported? Taking?   NIFEdipine (PROCARDIA XL) 30 mg 24 hr tablet Past Week  No Yes   Sig: Take 1 tablet (30 mg total) by mouth daily after dinner   acetaminophen (TYLENOL) 325 mg tablet Past Week  No Yes   Sig: Take 3 tablets (975 mg total) by mouth every 8 (eight) hours    allopurinol (ZYLOPRIM) 100 mg tablet Past Week Self No Yes   Sig: Take 1 tablet (100 mg total) by mouth daily   apixaban (Eliquis) 5 mg Past Week  No Yes   Sig: Take 1 tablet (5 mg total) by mouth 2 (two) times a day   aspirin 81 mg chewable tablet Past Week  No Yes   Sig: Chew 1 tablet (81 mg total) daily   atorvastatin (LIPITOR) 80 mg tablet Past Week  Yes Yes   Sig: Take 80 mg by mouth daily   gabapentin (NEURONTIN) 100 mg capsule Past Week  No Yes   Sig: Take 1 capsule (100 mg total) by mouth 3 (three) times a week   lidocaine (LIDODERM) 5 % Past Week  No Yes   Sig: Apply 1 patch topically over 12 hours daily Remove & Discard patch within 12 hours or as directed by MD   methocarbamol (ROBAXIN) 500 mg tablet Past Week  No Yes   Sig: Take 1 tablet (500 mg total) by mouth every 6 (six) hours as needed for muscle spasms   sevelamer (RENAGEL) 800 mg tablet Past Week Self No Yes   Sig: Take 1 tablet (800 mg total) by mouth 3 (three) times a day with meals      Facility-Administered Medications: None     No Known Allergies  Objective :  Temp:  [98 °F (36.7 °C)-98.8 °F (37.1 °C)] 98.8 °F (37.1 °C)  HR:  [65-73] 67  BP: (123-130)/(47-52) 123/48  Resp:  [14-17] 17  SpO2:  [97 %-99 %] 98 %  O2 Device: None (Room air)    Physical Exam  Vitals and nursing note reviewed.   Constitutional:       General: He is awake. He is not in acute distress.     Appearance: He is not ill-appearing, toxic-appearing or diaphoretic.   Skin:     General: Skin is warm and dry.   Neurological:      Mental Status: He is alert and oriented to person, place, and time.      GCS: GCS eye subscore is 4. GCS verbal subscore is 5. GCS motor subscore is 6.   Psychiatric:         Attention and Perception: Attention normal.         Speech: Speech normal.         Behavior: Behavior normal. Behavior is cooperative.            Lab Results: I have reviewed the following results:  Estimated Creatinine Clearance: 12.9 mL/min (A) (by C-G formula based on SCr of  7.1 mg/dL (H)).  Lab Results   Component Value Date    WBC 11.37 (H) 04/07/2025    HGB 6.5 (L) 04/07/2025    HCT 21.4 (L) 04/07/2025     04/07/2025         Component Value Date/Time    K 5.0 04/07/2025 0602    CL 93 (L) 04/07/2025 0602    CO2 25 04/07/2025 0602    BUN 49 (H) 04/07/2025 0602    CREATININE 7.10 (H) 04/07/2025 0602         Component Value Date/Time    CALCIUM 8.9 04/07/2025 0602    ALKPHOS 59 04/03/2025 0545    AST 14 04/03/2025 0545    ALT 7 04/03/2025 0545    TP 6.7 04/03/2025 0545    ALB 3.5 04/03/2025 0545       Imaging Results Review: No pertinent imaging studies reviewed.  Other Study Results Review: No additional pertinent studies reviewed.     Administrative Statements   I have spent a total time of 29 minutes in caring for this patient on the day of the visit/encounter including Risks and benefits of tx options, Instructions for management, Patient and family education, Importance of tx compliance, Risk factor reductions, Impressions, Counseling / Coordination of care, Documenting in the medical record, Reviewing/placing orders in the medical record (including tests, medications, and/or procedures), Obtaining or reviewing history  , and Communicating with other healthcare professionals .

## 2025-04-07 NOTE — ASSESSMENT & PLAN NOTE
Large retroperitoneal hematoma s/p IR embolization on 4/3  L2 vertebral fracture      Plan:  Acetaminophen 975 mg p.o. every 8 hours scheduled.  Gabapentin 100 mg p.o. 3 times weekly.  Methocarbamol 750 mg p.o. every 6 hours scheduled.  Lidocaine patch, on for 12 hours and off for 12 hours as needed for mild local pain.  Oxycodone 5 mg p.o. every 3 hours as needed moderate pain or 10 mg p.o. every 3 hours as needed severe pain.  Hydromorphone 0.5 mg IV every 2 hours as needed breakthrough pain.  Bowel regimen to avoid opioid-induced constipation while on opioid pain medication.  As needed Narcan in the event that opioid reversal becomes necessary.  Ice to painful area for up to 20 minutes every hour as needed.

## 2025-04-07 NOTE — RESTORATIVE TECHNICIAN NOTE
Restorative Technician Note      Patient Name: Naresh Carpio     Restorative Tech Visit Date: 04/07/25  Note Type: Bracing, Follow-up fitting  Patient Position Upon Consult: Bedside chair  Activity Performed: Stood  Assistive Device: Other (Comment) (b/L HHA)  Brace Applied: -- ([employer] LSO extension panels; Size 5)  Patient Position When Brace Applied: Standing  Patient Position at End of Consult: Bedside chair; All needs within reach; Bed/Chair alarm activated (Left in the care of RN)    Please contact [facility] PT Restorative tech on [employer] Secure Chat  in regards to bracing instruction and/or adjustment.    Sarah Urbina BS

## 2025-04-07 NOTE — ASSESSMENT & PLAN NOTE
TTS at [facility] IN [place]  Access: Right IJ permcath  EDW: 99 kg per previous hospital records  Continue with current dialysis schedule  Last treatment 4/5 ended early d/t uncontrolled pain per the pt.   Due for HD tomorrow 0800

## 2025-04-07 NOTE — SEDATION DOCUMENTATION
IR Gluteal embolization performed by Dr. Hernandez.  Anesthesia bedside.  Patient tolerated well, Bedrest for 2 hours, HOB flat, start time 1300. Right leg immobilizer applied.  Report called to PACU nurse and patient transported to PACU with Anesthesia.

## 2025-04-07 NOTE — PLAN OF CARE
Problem: PAIN - ADULT  Goal: Verbalizes/displays adequate comfort level or baseline comfort level  Description: Interventions:- Encourage patient to monitor pain and request assistance- Assess pain using appropriate pain scale- Administer analgesics based on type and severity of pain and evaluate response- Implement non-pharmacological measures as appropriate and evaluate response- Consider cultural and social influences on pain and pain management- Notify physician/advanced practitioner if interventions unsuccessful or patient reports new pain  Outcome: Progressing     Problem: INFECTION - ADULT  Goal: Absence or prevention of progression during hospitalization  Description: INTERVENTIONS:- Assess and monitor for signs and symptoms of infection- Monitor lab/diagnostic results- Monitor all insertion sites, i.e. indwelling lines, tubes, and drains- Monitor endotracheal if appropriate and nasal secretions for changes in amount and color- [place] appropriate cooling/warming therapies per order- Administer medications as ordered- Instruct and encourage patient and family to use good hand hygiene technique- Identify and instruct in appropriate isolation precautions for identified infection/condition  Outcome: Progressing     Problem: SAFETY ADULT  Goal: Patient will remain free of falls  Description: INTERVENTIONS:- Educate patient/family on patient safety including physical limitations- Instruct patient to call for assistance with activity - Consult OT/PT to assist with strengthening/mobility - Keep Call bell within reach- Keep bed low and locked with side rails adjusted as appropriate- Keep care items and personal belongings within reach- Initiate and maintain comfort rounds- Make Fall Risk Sign visible to staff- Offer Toileting every 2 Hours, in advance of need- Initiate/Maintain bed/chair alarm- Obtain necessary fall risk management equipment: - Apply yellow socks and bracelet for high fall risk patients-  Consider moving patient to room near nurses station  Outcome: Progressing  Goal: Maintain or return to baseline ADL function  Description: INTERVENTIONS:-  Assess patient's ability to carry out ADLs; assess patient's baseline for ADL function and identify physical deficits which impact ability to perform ADLs (bathing, care of mouth/teeth, toileting, grooming, dressing, etc.)- Assess/evaluate cause of self-care deficits - Assess range of motion- Assess patient's mobility; develop plan if impaired- Assess patient's need for assistive devices and provide as appropriate- Encourage maximum independence but intervene and supervise when necessary- Involve family in performance of ADLs- Assess for home care needs following discharge - Consider OT consult to assist with ADL evaluation and planning for discharge- Provide patient education as appropriate  Outcome: Progressing  Goal: Maintains/Returns to pre admission functional level  Description: INTERVENTIONS:- Perform AM-PAC 6 Click Basic Mobility/ Daily Activity assessment daily.- Set and communicate daily mobility goal to care team and patient/family/caregiver. - Collaborate with rehabilitation services on mobility goals if consulted- Reposition patient every 2 hours.- Out of bed for meals 3 times a day- Out of bed for toileting- Record patient progress and toleration of activity level   Outcome: Progressing     Problem: DISCHARGE PLANNING  Goal: Discharge to home or other facility with appropriate resources  Description: INTERVENTIONS:- Identify barriers to discharge w/patient and caregiver- Arrange for needed discharge resources and transportation as appropriate- Identify discharge learning needs (meds, wound care, etc.)- Arrange for interpretive services to assist at discharge as needed- Refer to Case Management Department for coordinating discharge planning if the patient needs post-hospital services based on physician/advanced practitioner order or complex needs  related to functional status, cognitive ability, or social support system  Outcome: Progressing     Problem: Knowledge Deficit  Goal: Patient/family/caregiver demonstrates understanding of disease process, treatment plan, medications, and discharge instructions  Description: Complete learning assessment and assess knowledge base.Interventions:- Provide teaching at level of understanding- Provide teaching via preferred learning methods  Outcome: Progressing     Problem: Prexisting or High Potential for Compromised Skin Integrity  Goal: Skin integrity is maintained or improved  Description: INTERVENTIONS:- Identify patients at risk for skin breakdown- Assess and monitor skin integrity- Assess and monitor nutrition and hydration status- Monitor labs - Assess for incontinence - Turn and reposition patient- Assist with mobility/ambulation- Relieve pressure over bony prominences- Avoid friction and shearing- Provide appropriate hygiene as needed including keeping skin clean and dry- Evaluate need for skin moisturizer/barrier cream- Collaborate with interdisciplinary team - Patient/family teaching- Consider wound care consult   Outcome: Progressing     Problem: METABOLIC, FLUID AND ELECTROLYTES - ADULT  Goal: Electrolytes maintained within normal limits  Description: INTERVENTIONS:- Monitor labs and assess patient for signs and symptoms of electrolyte imbalances- Administer electrolyte replacement as ordered- Monitor response to electrolyte replacements, including repeat lab results as appropriate- Instruct patient on fluid and nutrition as appropriate  Outcome: Progressing  Goal: Fluid balance maintained  Description: INTERVENTIONS:- Monitor labs - Monitor I/O and WT- Instruct patient on fluid and nutrition as appropriate- Assess for signs & symptoms of volume excess or deficit  Outcome: Progressing

## 2025-04-07 NOTE — ANESTHESIA POSTPROCEDURE EVALUATION
Post-Op Assessment Note    CV Status:  Stable    Pain management: adequate       Mental Status:  Awake   Hydration Status:  Euvolemic   PONV Controlled:  Controlled   Airway Patency:  Patent     Post Op Vitals Reviewed: Yes    No anethesia notable event occurred.    Staff: Anesthesiologist           Last Filed PACU Vitals:  Vitals Value Taken Time   Temp 98.8 °F (37.1 °C) 04/07/25 1302   Pulse 83 04/07/25 1322   /61 04/07/25 1315   Resp 22 04/07/25 1322   SpO2 99 % 04/07/25 1322   Vitals shown include unfiled device data.    Modified Mary Alice:     Vitals Value Taken Time   Activity 2 04/07/25 1313   Respiration 2 04/07/25 1313   Circulation 2 04/07/25 1313   Consciousness 2 04/07/25 1313   Oxygen Saturation 2 04/07/25 1313     Modified Mary Alice Score: 10

## 2025-04-07 NOTE — ANESTHESIA POSTPROCEDURE EVALUATION
Post-Op Assessment Note    CV Status:  Stable  Pain Score: 0    Pain management: adequate       Mental Status:  Awake and alert   Hydration Status:  Stable   PONV Controlled:  None   Airway Patency:  Patent     Post Op Vitals Reviewed: Yes    No anethesia notable event occurred.    Staff: CRNA           Last Filed PACU Vitals:  Vitals Value Taken Time   Temp     Pulse 84 04/07/25 1304   /65    Resp 14 04/07/25 1304   SpO2 99 % 04/07/25 1304   Vitals shown include unfiled device data.

## 2025-04-07 NOTE — ASSESSMENT & PLAN NOTE
History of a fall onto a rail a few days ago. Continued to have progressive pain  Presented to [facility] ED, found to have retroperitoneal hemorrhage with areas of active contrast extravasation. Transferred to B  Pt due to go to IR today, 4/7/25, for EMBO - Follow up CT is reviewed showing a 5 mm inferior gluteal artery branch pseudoaneurysm   Management per primary service

## 2025-04-07 NOTE — BRIEF OP NOTE (RAD/CATH)
INTERVENTIONAL RADIOLOGY PROCEDURE NOTE    Date: 4/7/2025    Procedure: Pelvic angiogram, embolization of left inferior gluteal artery      Procedure Summary       Date: 04/07/25 Room / Location: [facility] Interventional Radiology    Anesthesia Start: 1141 Anesthesia Stop:     Procedure: IR EMBOLIZATION (SPECIFY VESSEL OR SITE) Diagnosis: (S/P fall s/p right gluteal embolization. Continued drop in hgb. f/u scan shows left inferior gluteal pseudoaneurysm. Stable)    Scheduled Providers: Anthony Navas MD Responsible Provider: Anthony Navas MD    Anesthesia Type: general ASA Status: 3            Preoperative diagnosis:   1. Hematoma    2. ESRD (end stage renal disease) (HCC)    3. Acute pain due to trauma    4. Closed fracture of second lumbar vertebra with routine healing, unspecified fracture morphology, subsequent encounter         Postoperative diagnosis: Same.    Surgeon: [place] Hernandez MD     Assistant: None. No qualified resident was available.    Blood loss: 5 mL    Specimens: None     Findings: Pelvic angiogram showed pseudoaneurysms present at distal branches of the left inferior gluteal artery. The artery was embolized using coils and gelfoam.    Completion angiography showed no pseudoaneurysms present.     The right CFA access site was closed using a Perclose device.    Complications: None immediate.    Anesthesia: MAC sedation

## 2025-04-07 NOTE — PROGRESS NOTES
Progress Note - Nephrology   Name: Naresh Carpio 64 y.o. male I MRN: [id number]  Unit/Bed#: [facility] 618-01 I Date of Admission: 4/3/2025   Date of Service: 4/7/2025 I Hospital Day: 4     Assessment & Plan  ESRD (end stage renal disease) on dialysis (HCC)  TTS at [facility] IN [place]  Access: Right IJ permcath  EDW: 99 kg per previous hospital records  Continue with current dialysis schedule  Last treatment 4/5 ended early d/t uncontrolled pain per the pt.   Due for HD tomorrow 0800  Traumatic retroperitoneal hemorrhage  History of a fall onto a rail a few days ago. Continued to have progressive pain  Presented to [place] ED, found to have retroperitoneal hemorrhage with areas of active contrast extravasation. Transferred to [facility]  Pt due to go to IR today, 4/7/25, for EMBO - Follow up CT is reviewed showing a 5 mm inferior gluteal artery branch pseudoaneurysm   Management per primary service  Primary hypertension  BP currently stable  Home regimen: Nifedipine 30 mg daily   Anemia due to chronic kidney disease, on chronic dialysis (HCC)  Start SURINDER therapy 5000 units q. HD  Recent hemoglobin stable at 6.5 - type and screen sent. Order placed for 1 PRBC  May require further imaging as per primary service  At risk for electrolyte imbalance  Recent potassium 5.0, calcium 8.9 phosphorus 4.9  Chronic diastolic (congestive) heart failure (HCC)  Echo Oct. 2024 with LVEF 60-65%  Monitor volume status   UF with HD  Chronic kidney disease-mineral and bone disorder  Home regimen: Renagel 800 mg TID per chart review  Most recent Phosphorus 4.9  Paroxysmal atrial fibrillation (HCC)  Per critical care  L2 vertebral fracture (HCC)  As per primary service.  Acute pain due to trauma      Review of Systems  Patient seen and examined at bedside. Pt OOB in bedside chair. Denies pain. Pt feeling anxious and nervous for upcoming procedure. Currently NPO. Denies SOB and any CP. Also denies N/V/D. Due for HD tomorrow. HD cath noted, dsg c/d/I.      Review of Systems   Constitutional:  Negative for fever.   Respiratory:  Negative for cough and shortness of breath.    Cardiovascular:  Negative for chest pain, palpitations and leg swelling.   Gastrointestinal:  Negative for constipation, diarrhea, nausea and vomiting.   Skin:  Negative for color change.   Neurological:  Negative for dizziness, light-headedness and headaches.         Physical Exam:  Current Weight: Weight - Scale: 100 kg (221 lb)  Vitals:    04/06/25 2220 04/07/25 0244 04/07/25 0524 04/07/25 0748   BP: (!) 130/49 (!) 129/48  (!) 123/48   Pulse: 70 66 73 67   Resp: 14 17     Temp: 98.3 °F (36.8 °C) 98 °F (36.7 °C)  98.8 °F (37.1 °C)   TempSrc:       SpO2: 98% 97% 98% 98%   Weight:       Height:           Physical Exam  Vitals and nursing note reviewed.   Constitutional:       Appearance: He is obese. He is ill-appearing.   Cardiovascular:      Rate and Rhythm: Normal rate.      Pulses: Normal pulses.      Heart sounds: No murmur heard.     No gallop.   Pulmonary:      Effort: Pulmonary effort is normal. No respiratory distress.      Breath sounds: Normal breath sounds. No wheezing.   Abdominal:      General: Bowel sounds are normal.      Palpations: Abdomen is soft.      Tenderness: There is no abdominal tenderness.   Musculoskeletal:      Right lower leg: Edema present.      Left lower leg: Edema present.   Skin:     General: Skin is warm and dry.      Capillary Refill: Capillary refill takes less than 2 seconds.   Neurological:      Mental Status: He is alert and oriented to person, place, and time. Mental status is at baseline.   Psychiatric:         Mood and Affect: Mood normal.         Behavior: Behavior normal.        Medications:    Current Facility-Administered Medications:     acetaminophen (TYLENOL) tablet 975 mg, 975 mg, Oral, Q8H [facility], Nallely Ayesha, PA-C, 975 mg at 04/07/25 0524    allopurinol (ZYLOPRIM) tablet 100 mg, 100 mg, Oral, Daily, Nallely Ayesha, PA-C, 100 mg at  04/07/25 0822    atorvastatin (LIPITOR) tablet 80 mg, 80 mg, Oral, Daily With Dinner, Nallely Hodge PA-C, 80 mg at 04/06/25 1704    chlorhexidine (PERIDEX) 0.12 % oral rinse 15 mL, 15 mL, Mouth/Throat, Q12H KEMAL, Nallely Hodge PA-C, 15 mL at 04/07/25 0822    epoetin jessica (EPOGEN,PROCRIT) injection 3,000 Units, 3,000 Units, Intravenous, After Dialysis, 3,000 Units at 04/05/25 1021 **AND** epoetin jessica (EPOGEN,PROCRIT) injection 2,000 Units, 2,000 Units, Intravenous, After Dialysis, Anthony Mixon DO, 2,000 Units at 04/05/25 1021    gabapentin (NEURONTIN) capsule 100 mg, 100 mg, Oral, Once per day on Monday Wednesday Friday, CARSON Doyle-C, 100 mg at 04/07/25 0822    HYDROmorphone (DILAUDID) injection 0.5 mg, 0.5 mg, Intravenous, Q2H PRN, Daryl Segal MD, 0.5 mg at 04/06/25 1133    lidocaine (LIDODERM) 5 % patch 1 patch, 1 patch, Topical, Daily PRN, CARSON Doyle-C, 1 patch at 04/05/25 1146    methocarbamol (ROBAXIN) tablet 750 mg, 750 mg, Oral, Q6H KEMAL, Daryl Segal MD, 750 mg at 04/07/25 0524    naloxone (NARCAN) 0.04 mg/mL syringe 0.04 mg, 0.04 mg, Intravenous, Q1MIN PRN, Nallely Hodge PA-C    NIFEdipine (PROCARDIA XL) 24 hr tablet 30 mg, 30 mg, Oral, After Dinner, GRANT Zuluaga    oxyCODONE (ROXICODONE) IR tablet 5 mg, 5 mg, Oral, Q3H PRN **OR** oxyCODONE (ROXICODONE) immediate release tablet 10 mg, 10 mg, Oral, Q3H PRN, Daryl Segal MD, 10 mg at 04/07/25 0822    senna-docusate sodium (SENOKOT S) 8.6-50 mg per tablet 1 tablet, 1 tablet, Oral, HS, Nallely Aldanaergan, PA-C, 1 tablet at 04/06/25 2210    sevelamer (RENAGEL) tablet 1,600 mg, 1,600 mg, Oral, TID With Meals, Nallely Ayesha, PA-C, 1,600 mg at 04/07/25 0821    Laboratory Results:  Results from last 7 days   Lab Units 04/07/25  0602 04/06/25  0910 04/06/25  0109 04/05/25  1947 04/05/25  1410 04/05/25  0840 04/04/25  2031 04/04/25  1857 04/04/25  0628 04/03/25  1524 04/03/25  0545  "04/03/25  0148 04/02/25 2024   WBC Thousand/uL 11.37* 11.08*  --   --   --   --   --   --  8.27  --  8.74 9.65 9.24   HEMOGLOBIN g/dL 6.5* 7.2* 7.2* 7.7* 6.9* 7.2* 6.8* 6.9* 6.5*   < > 7.5* 6.5* 7.2*   HEMATOCRIT % 21.4* 22.5* 22.0* 23.4* 21.1* 22.0*  --  21.3* 20.9*  --  23.7* 20.7* 23.4*   PLATELETS Thousands/uL 312 282  --   --   --   --   --   --  267  --  264 267 340   SODIUM mmol/L 129* 131*  --   --   --  130*  --   --  133*  --  133* 132* 131*   POTASSIUM mmol/L 5.0 4.6  --   --   --  4.5  --   --  4.2  --  5.3 5.0 5.4*   CHLORIDE mmol/L 93* 93*  --   --   --  91*  --   --  95*  --  94* 93* 91*   CO2 mmol/L 25 28  --   --   --  28  --   --  27  --  27 22 19*   BUN mg/dL 49* 39*  --   --   --  47*  --   --  33*  --  63* 58* 55*   CREATININE mg/dL 7.10* 5.82*  --   --   --  6.42*  --   --  4.75*  --  7.00* 6.78* 6.60*   CALCIUM mg/dL 8.9 9.1  --   --   --  9.0  --   --  8.9  --  8.5 8.2* 9.1   MAGNESIUM mg/dL  --   --   --   --   --   --   --   --  2.2  --  2.2 2.1  --    PHOSPHORUS mg/dL  --   --   --   --   --   --   --   --  4.9*  --  8.3* 7.7*  --     < > = values in this interval not displayed.       Medical records have been reviewed through [facility] and care everywhere for this patient encounter    Portions of the record may have been created with voice recognition software. Occasional wrong word or \"sound a like\" substitutions may have occurred due to the inherent limitations of voice recognition software. Read the chart carefully and recognize,   "

## 2025-04-07 NOTE — ASSESSMENT & PLAN NOTE
Lab Results   Component Value Date    EGFR 7 04/07/2025    EGFR 9 04/06/2025    EGFR 8 04/05/2025    CREATININE 7.10 (H) 04/07/2025    CREATININE 5.82 (H) 04/06/2025    CREATININE 6.42 (H) 04/05/2025

## 2025-04-07 NOTE — ANESTHESIA PREPROCEDURE EVALUATION
Procedure:  IR EMBOLIZATION (SPECIFY VESSEL OR SITE)    Relevant Problems   CARDIO   (+) Diabetes mellitus type 2 with peripheral artery disease (HCC)   (+) Hypertension   (+) PAD (peripheral artery disease) (HCC)   (+) Paroxysmal atrial fibrillation (HCC)   (+) Primary hypertension      ENDO   (+) Secondary hyperparathyroidism (HCC)   (+) Type 2 diabetes mellitus, without long-term current use of insulin (HCC)      /RENAL   (+) Anemia due to chronic kidney disease, on chronic dialysis (HCC)   (+) Chronic kidney disease-mineral and bone disorder   (+) 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, on chronic dialysis (HCC)   (+) ESRD (end stage renal disease) (HCC)   (+) ESRD (end stage renal disease) on dialysis (HCC)   (+) ESRD on dialysis (HCC)   (+) Hemodialysis status (HCC)   (+) Renal lesion      HEMATOLOGY   (+) Anemia due to chronic kidney disease, on chronic dialysis (HCC)   (+) Anemia in ESRD (end-stage renal disease)  (HCC)      MUSCULOSKELETAL   (+) Gout   (+) Osteoarthritis of left hip   (+) Traumatic rhabdomyolysis (HCC)      NEURO/PSYCH   (+) Diabetic polyneuropathy associated with type 2 diabetes mellitus (HCC)      PULMONARY   (+) Pleural effusion      Other   (+) Subacute osteomyelitis of right foot (HCC)        Left Ventricle: Left ventricular cavity size is normal. Wall thickness is normal. There is borderline concentric hypertrophy. The left ventricular ejection fraction is 60 to 65 % by visual estimation. Systolic function is normal. Wall motion is normal. Diastolic function is mildly abnormal, consistent with grade I (abnormal) relaxation.    Left Atrium: The atrium is severely dilated ( 54 mL/m2).    Atrial Septum: There is no atrial septal defect by saline contrast. No patent foramen ovale detected, confirmed at rest using agitated saline contrast. 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  It has free respirophasic mobility between the right  and left atrium.    Aortic Valve: There is mild regurgitation. There is aortic valve sclerosis.    Mitral Valve: There is mild annular calcification. There is mild regurgitation.    Tricuspid Valve: There is mild regurgitation.  Calculated pulmonary artery pressure around 30 mmHg.    Prior TTE study available for comparison. Prior study date: 8/25/2023.  No significant change.     Physical Exam    Airway    Mallampati score: II  TM Distance: >3 FB  Neck ROM: full     Dental       Cardiovascular  Rhythm: regular, No weak pulses    Pulmonary   No stridor    Other Findings        Anesthesia Plan  ASA Score- 3     Anesthesia Type- general with ASA Monitors.         Additional Monitors:     Airway Plan: LMA.           Plan Factors-    Chart reviewed.   Existing labs reviewed. Patient summary reviewed.                  Induction- intravenous.    Postoperative Plan- Plan for postoperative opioid use. Planned trial extubation    Perioperative Resuscitation Plan - Level 1 - Full Code.       Informed Consent- Anesthetic plan and risks discussed with patient.  I personally reviewed this patient with the CRNA. Discussed and agreed on the Anesthesia Plan with the CRNA..      NPO Status:  No vitals data found for the desired time range.

## 2025-04-08 ENCOUNTER — APPOINTMENT (INPATIENT)
Dept: DIALYSIS | Facility: HOSPITAL | Age: 65
DRG: 907 | End: 2025-04-08
Payer: MEDICARE

## 2025-04-08 PROBLEM — I72.9 PSEUDOANEURYSM (HCC): Status: ACTIVE | Noted: 2025-04-08

## 2025-04-08 LAB
ABO GROUP BLD BPU: NORMAL
ANION GAP SERPL CALCULATED.3IONS-SCNC: 13 MMOL/L (ref 4–13)
ATRIAL RATE: 49 BPM
BASOPHILS # BLD AUTO: 0.04 THOUSANDS/ÂΜL (ref 0–0.1)
BASOPHILS NFR BLD AUTO: 0 % (ref 0–1)
BPU ID: NORMAL
BUN SERPL-MCNC: 62 MG/DL (ref 5–25)
CALCIUM SERPL-MCNC: 8.9 MG/DL (ref 8.4–10.2)
CFFMA (FUNCTIONAL FIBRINOGEN MAX AMPLITUDE): 44.5 MM (ref 15–32)
CHLORIDE SERPL-SCNC: 93 MMOL/L (ref 96–108)
CKLY30: 0 % (ref 0–2.6)
CKR(REACTION TIME): 3.3 MIN (ref 4.6–9.1)
CO2 SERPL-SCNC: 23 MMOL/L (ref 21–32)
CREAT SERPL-MCNC: 8.57 MG/DL (ref 0.6–1.3)
CROSSMATCH: NORMAL
CRTMA(RAPIDTEG MAX AMPLITUDE): 71.9 MM (ref 52–70)
EOSINOPHIL # BLD AUTO: 0.23 THOUSAND/ÂΜL (ref 0–0.61)
EOSINOPHIL NFR BLD AUTO: 2 % (ref 0–6)
ERYTHROCYTE [DISTWIDTH] IN BLOOD BY AUTOMATED COUNT: 17.2 % (ref 11.6–15.1)
GFR SERPL CREATININE-BSD FRML MDRD: 5 ML/MIN/1.73SQ M
GLUCOSE SERPL-MCNC: 101 MG/DL (ref 65–140)
GLUCOSE SERPL-MCNC: 102 MG/DL (ref 65–140)
GLUCOSE SERPL-MCNC: 95 MG/DL (ref 65–140)
HCT VFR BLD AUTO: 21.6 % (ref 36.5–49.3)
HCT VFR BLD AUTO: 22.7 % (ref 36.5–49.3)
HGB BLD-MCNC: 7 G/DL (ref 12–17)
HGB BLD-MCNC: 7 G/DL (ref 12–17)
IMM GRANULOCYTES # BLD AUTO: 0.13 THOUSAND/UL (ref 0–0.2)
IMM GRANULOCYTES NFR BLD AUTO: 1 % (ref 0–2)
LYMPHOCYTES # BLD AUTO: 1.05 THOUSANDS/ÂΜL (ref 0.6–4.47)
LYMPHOCYTES NFR BLD AUTO: 9 % (ref 14–44)
MCH RBC QN AUTO: 30 PG (ref 26.8–34.3)
MCHC RBC AUTO-ENTMCNC: 32.4 G/DL (ref 31.4–37.4)
MCV RBC AUTO: 93 FL (ref 82–98)
MONOCYTES # BLD AUTO: 1.34 THOUSAND/ÂΜL (ref 0.17–1.22)
MONOCYTES NFR BLD AUTO: 11 % (ref 4–12)
NEUTROPHILS # BLD AUTO: 9.18 THOUSANDS/ÂΜL (ref 1.85–7.62)
NEUTS SEG NFR BLD AUTO: 77 % (ref 43–75)
NRBC BLD AUTO-RTO: 0 /100 WBCS
P AXIS: 49 DEGREES
PLATELET # BLD AUTO: 292 THOUSANDS/UL (ref 149–390)
PMV BLD AUTO: 9.2 FL (ref 8.9–12.7)
POTASSIUM SERPL-SCNC: 5.6 MMOL/L (ref 3.5–5.3)
PR INTERVAL: 162 MS
QRS AXIS: 7 DEGREES
QRSD INTERVAL: 92 MS
QT INTERVAL: 456 MS
QTC INTERVAL: 412 MS
RBC # BLD AUTO: 2.33 MILLION/UL (ref 3.88–5.62)
SODIUM SERPL-SCNC: 129 MMOL/L (ref 135–147)
T WAVE AXIS: 95 DEGREES
UNIT DISPENSE STATUS: NORMAL
UNIT PRODUCT CODE: NORMAL
UNIT PRODUCT VOLUME: 350 ML
UNIT RH: NORMAL
VENTRICULAR RATE: 49 BPM
WBC # BLD AUTO: 11.97 THOUSAND/UL (ref 4.31–10.16)

## 2025-04-08 PROCEDURE — 85014 HEMATOCRIT: CPT | Performed by: PHYSICIAN ASSISTANT

## 2025-04-08 PROCEDURE — 85025 COMPLETE CBC W/AUTO DIFF WBC: CPT

## 2025-04-08 PROCEDURE — 80048 BASIC METABOLIC PNL TOTAL CA: CPT

## 2025-04-08 PROCEDURE — 85018 HEMOGLOBIN: CPT | Performed by: PHYSICIAN ASSISTANT

## 2025-04-08 PROCEDURE — 85347 COAGULATION TIME ACTIVATED: CPT

## 2025-04-08 PROCEDURE — 93010 ELECTROCARDIOGRAM REPORT: CPT | Performed by: INTERNAL MEDICINE

## 2025-04-08 PROCEDURE — 99231 SBSQ HOSP IP/OBS SF/LOW 25: CPT | Performed by: PHYSICIAN ASSISTANT

## 2025-04-08 PROCEDURE — 90935 HEMODIALYSIS ONE EVALUATION: CPT

## 2025-04-08 PROCEDURE — 99233 SBSQ HOSP IP/OBS HIGH 50: CPT | Performed by: PHYSICIAN ASSISTANT

## 2025-04-08 PROCEDURE — 85576 BLOOD PLATELET AGGREGATION: CPT

## 2025-04-08 PROCEDURE — 85384 FIBRINOGEN ACTIVITY: CPT

## 2025-04-08 PROCEDURE — 85397 CLOTTING FUNCT ACTIVITY: CPT

## 2025-04-08 PROCEDURE — 82948 REAGENT STRIP/BLOOD GLUCOSE: CPT

## 2025-04-08 PROCEDURE — 99232 SBSQ HOSP IP/OBS MODERATE 35: CPT | Performed by: EMERGENCY MEDICINE

## 2025-04-08 RX ORDER — HYDROMORPHONE HCL/PF 1 MG/ML
0.5 SYRINGE (ML) INJECTION EVERY 6 HOURS PRN
Status: DISCONTINUED | OUTPATIENT
Start: 2025-04-08 | End: 2025-04-11 | Stop reason: HOSPADM

## 2025-04-08 RX ADMIN — OXYCODONE HYDROCHLORIDE 10 MG: 10 TABLET ORAL at 00:40

## 2025-04-08 RX ADMIN — HYDROMORPHONE HYDROCHLORIDE 0.5 MG: 1 INJECTION, SOLUTION INTRAMUSCULAR; INTRAVENOUS; SUBCUTANEOUS at 01:54

## 2025-04-08 RX ADMIN — METHOCARBAMOL 750 MG: 750 TABLET ORAL at 23:48

## 2025-04-08 RX ADMIN — CHLORHEXIDINE GLUCONATE 0.12% ORAL RINSE 15 ML: 1.2 LIQUID ORAL at 12:30

## 2025-04-08 RX ADMIN — SEVELAMER HYDROCHLORIDE 1600 MG: 800 TABLET ORAL at 18:09

## 2025-04-08 RX ADMIN — ALLOPURINOL 100 MG: 100 TABLET ORAL at 12:29

## 2025-04-08 RX ADMIN — ACETAMINOPHEN 975 MG: 325 TABLET, FILM COATED ORAL at 05:03

## 2025-04-08 RX ADMIN — OXYCODONE HYDROCHLORIDE 10 MG: 10 TABLET ORAL at 20:11

## 2025-04-08 RX ADMIN — OXYCODONE HYDROCHLORIDE 10 MG: 10 TABLET ORAL at 23:48

## 2025-04-08 RX ADMIN — METHOCARBAMOL 750 MG: 750 TABLET ORAL at 12:30

## 2025-04-08 RX ADMIN — ATORVASTATIN CALCIUM 80 MG: 80 TABLET, FILM COATED ORAL at 18:09

## 2025-04-08 RX ADMIN — ACETAMINOPHEN 975 MG: 325 TABLET, FILM COATED ORAL at 21:59

## 2025-04-08 RX ADMIN — NIFEDIPINE 30 MG: 30 TABLET, FILM COATED, EXTENDED RELEASE ORAL at 18:09

## 2025-04-08 RX ADMIN — CHLORHEXIDINE GLUCONATE 0.12% ORAL RINSE 15 ML: 1.2 LIQUID ORAL at 21:59

## 2025-04-08 RX ADMIN — METHOCARBAMOL 750 MG: 750 TABLET ORAL at 05:03

## 2025-04-08 RX ADMIN — HYDROMORPHONE HYDROCHLORIDE 0.5 MG: 1 INJECTION, SOLUTION INTRAMUSCULAR; INTRAVENOUS; SUBCUTANEOUS at 05:03

## 2025-04-08 RX ADMIN — HYDROMORPHONE HYDROCHLORIDE 0.5 MG: 1 INJECTION, SOLUTION INTRAMUSCULAR; INTRAVENOUS; SUBCUTANEOUS at 21:59

## 2025-04-08 RX ADMIN — METHOCARBAMOL 750 MG: 750 TABLET ORAL at 18:09

## 2025-04-08 RX ADMIN — OXYCODONE HYDROCHLORIDE 10 MG: 10 TABLET ORAL at 16:27

## 2025-04-08 RX ADMIN — EPOETIN ALFA 2000 UNITS: 2000 SOLUTION INTRAVENOUS; SUBCUTANEOUS at 10:18

## 2025-04-08 RX ADMIN — EPOETIN ALFA 3000 UNITS: 3000 SOLUTION INTRAVENOUS; SUBCUTANEOUS at 10:18

## 2025-04-08 RX ADMIN — SENNOSIDES AND DOCUSATE SODIUM 1 TABLET: 50; 8.6 TABLET ORAL at 21:59

## 2025-04-08 RX ADMIN — SEVELAMER HYDROCHLORIDE 1600 MG: 800 TABLET ORAL at 12:30

## 2025-04-08 RX ADMIN — OXYCODONE HYDROCHLORIDE 10 MG: 10 TABLET ORAL at 03:42

## 2025-04-08 NOTE — ASSESSMENT & PLAN NOTE
History of a fall onto a rail a few days ago. Continued to have progressive pain  Presented to [place] ED, found to have retroperitoneal hemorrhage with areas of active contrast extravasation. Transferred to [facility]  Required 1u PRBC 4/7, Post-transfusion hemoglobin today 7.0  Management per primary service

## 2025-04-08 NOTE — ASSESSMENT & PLAN NOTE
TTS at [facility] IN [place]  Access: Right IJ permcath  EDW: 99 kg per previous hospital records  Continue with current dialysis schedule  Due for HD Thursday 4/10/25

## 2025-04-08 NOTE — OCCUPATIONAL THERAPY NOTE
Occupational Therapy Treatment Note:      04/08/25 1130   Note Type   Note Type Cancelled Session   Cancel Reasons Patient off floor/hemodialysis     Pt was attempted second time this tx date and refused in pm due to pain . Will follow as appropriate

## 2025-04-08 NOTE — PROGRESS NOTES
Progress Note - Acute Pain   Name: Naresh Carpio 64 y.o. male I MRN: [id number]  [facility] Date of Admission: 4/3/2025   Date of Service: 4/8/2025 I Hospital Day: 5    Assessment & Plan  ESRD (end stage renal disease) on dialysis (HCC)  Lab Results   Component Value Date    EGFR 5 04/08/2025    EGFR 7 04/07/2025    EGFR 9 04/06/2025    CREATININE 8.57 (H) 04/08/2025    CREATININE 7.10 (H) 04/07/2025    CREATININE 5.82 (H) 04/06/2025     Acute pain due to trauma  Large retroperitoneal hematoma s/p IR embolization on 4/3  L2 vertebral fracture      Plan:  Acetaminophen 975 mg p.o. every 8 hours scheduled.  Gabapentin 100 mg p.o. 3 times weekly.  Methocarbamol 750 mg p.o. every 6 hours scheduled.  Lidocaine patch, on for 12 hours and off for 12 hours as needed for mild local pain.  Oxycodone 5 mg p.o. every 3 hours as needed moderate pain or 10 mg p.o. every 3 hours as needed severe pain.  Change hydromorphone to 0.5 mg IV every 6 hours as needed breakthrough pain.  Patient was encouraged to attempt to avoid IV opioids to ensure that an oral opioid regimen will be effective at discharge.  Bowel regimen to avoid opioid-induced constipation while on opioid pain medication.  As needed Narcan in the event that opioid reversal becomes necessary.  Ice to painful area for up to 20 minutes every hour as needed.        Pseudoaneurysm (HCC)      APS will continue to follow. Please contact Acute Pain Service - via SecureChat from [phone] with additional questions or concerns. See [employer] or [employer] for additional contacts and after hours information.     Subjective   Naresh Carpio is a 64 y.o. male with PMHx of AoCD, ESRD on HD, A-fib, HFpEF who presented with a large retroperitoneal hematoma c/b L2 vertebral fracture after mechanical fall. He is s/p IR embolization of deep circumflex iliac artery/superior gluteal artery on 4/3.  Return to IR on 4/7/2025 for embolization of pseudoaneurysms.    Pain  History  Current pain location(s):  Pain Score: 0  Pain Location/Orientation: Orientation: Lower, Location: Back  Pain Scale: Pain Assessment Tool: 0-10  24 hour history: Patient states his pain is somewhat improved compared to yesterday.  Despite requiring several doses of IV opioids in the past 24 hours feels he may be able to do without and is willing to try to avoid IV opioids overnight.    Opioid requirement previous 24 hours: Oxycodone 10 mg p.o. x 4, hydromorphone 0.5 mg IV x 4.  Meds/Allergies   all current active meds have been reviewed, current meds:   Current Facility-Administered Medications:     acetaminophen (TYLENOL) tablet 975 mg, Q8H KEMAL    allopurinol (ZYLOPRIM) tablet 100 mg, Daily    atorvastatin (LIPITOR) tablet 80 mg, Daily With Dinner    chlorhexidine (PERIDEX) 0.12 % oral rinse 15 mL, Q12H KEMAL    epoetin jessica (EPOGEN,PROCRIT) injection 3,000 Units, After Dialysis **AND** epoetin jessica (EPOGEN,PROCRIT) injection 2,000 Units, After Dialysis    gabapentin (NEURONTIN) capsule 100 mg, Once per day on Monday Wednesday Friday    HYDROmorphone (DILAUDID) injection 0.5 mg, Q6H PRN    lidocaine (LIDODERM) 5 % patch 1 patch, Daily PRN    methocarbamol (ROBAXIN) tablet 750 mg, Q6H KEMAL    naloxone (NARCAN) 0.04 mg/mL syringe 0.04 mg, Q1MIN PRN    NIFEdipine (PROCARDIA XL) 24 hr tablet 30 mg, After Dinner    oxyCODONE (ROXICODONE) IR tablet 5 mg, Q3H PRN **OR** oxyCODONE (ROXICODONE) immediate release tablet 10 mg, Q3H PRN    senna-docusate sodium (SENOKOT S) 8.6-50 mg per tablet 1 tablet, HS    sevelamer (RENAGEL) tablet 1,600 mg, TID With Meals, and PTA meds:   Prior to Admission Medications   Prescriptions Last Dose Informant Patient Reported? Taking?   NIFEdipine (PROCARDIA XL) 30 mg 24 hr tablet Past Week  No Yes   Sig: Take 1 tablet (30 mg total) by mouth daily after dinner   acetaminophen (TYLENOL) 325 mg tablet Past Week  No Yes   Sig: Take 3 tablets (975 mg total) by mouth every 8 (eight) hours    allopurinol (ZYLOPRIM) 100 mg tablet Past Week Self No Yes   Sig: Take 1 tablet (100 mg total) by mouth daily   apixaban (Eliquis) 5 mg Past Week  No Yes   Sig: Take 1 tablet (5 mg total) by mouth 2 (two) times a day   aspirin 81 mg chewable tablet Past Week  No Yes   Sig: Chew 1 tablet (81 mg total) daily   atorvastatin (LIPITOR) 80 mg tablet Past Week  Yes Yes   Sig: Take 80 mg by mouth daily   gabapentin (NEURONTIN) 100 mg capsule Past Week  No Yes   Sig: Take 1 capsule (100 mg total) by mouth 3 (three) times a week   lidocaine (LIDODERM) 5 % Past Week  No Yes   Sig: Apply 1 patch topically over 12 hours daily Remove & Discard patch within 12 hours or as directed by MD   methocarbamol (ROBAXIN) 500 mg tablet Past Week  No Yes   Sig: Take 1 tablet (500 mg total) by mouth every 6 (six) hours as needed for muscle spasms   sevelamer (RENAGEL) 800 mg tablet Past Week Self No Yes   Sig: Take 1 tablet (800 mg total) by mouth 3 (three) times a day with meals      Facility-Administered Medications: None     No Known Allergies  Objective :  Temp:  [97.6 °F (36.4 °C)-99.3 °F (37.4 °C)] 98 °F (36.7 °C)  HR:  [55-86] 70  BP: (103-145)/(41-67) 127/41  Resp:  [13-18] 13  SpO2:  [92 %-98 %] 97 %  O2 Device: None (Room air)    Physical Exam  Vitals and nursing note reviewed.   Constitutional:       General: He is awake. He is not in acute distress.     Appearance: He is not ill-appearing, toxic-appearing or diaphoretic.   Skin:     General: Skin is warm and dry.   Neurological:      Mental Status: He is alert and oriented to person, place, and time.      GCS: GCS eye subscore is 4. GCS verbal subscore is 5. GCS motor subscore is 6.   Psychiatric:         Attention and Perception: Attention normal.         Speech: Speech normal.         Behavior: Behavior normal. Behavior is cooperative.            Lab Results: I have reviewed the following results:  Estimated Creatinine Clearance: 10.7 mL/min (A) (by C-G formula based on SCr of  8.57 mg/dL (H)).  Lab Results   Component Value Date    WBC 11.97 (H) 04/08/2025    HGB 7.0 (L) 04/08/2025    HCT 22.7 (L) 04/08/2025     04/08/2025         Component Value Date/Time    K 5.6 (H) 04/08/2025 0801    CL 93 (L) 04/08/2025 0801    CO2 23 04/08/2025 0801    BUN 62 (H) 04/08/2025 0801    CREATININE 8.57 (H) 04/08/2025 0801         Component Value Date/Time    CALCIUM 8.9 04/08/2025 0801    ALKPHOS 59 04/03/2025 0545    AST 14 04/03/2025 0545    ALT 7 04/03/2025 0545    TP 6.7 04/03/2025 0545    ALB 3.5 04/03/2025 0545       Imaging Results Review: No pertinent imaging studies reviewed.  Other Study Results Review: No additional pertinent studies reviewed.     Administrative Statements   I have spent a total time of 33 minutes in caring for this patient on the day of the visit/encounter including Risks and benefits of tx options, Instructions for management, Patient and family education, Importance of tx compliance, Risk factor reductions, Impressions, Counseling / Coordination of care, Documenting in the medical record, Reviewing/placing orders in the medical record (including tests, medications, and/or procedures), Obtaining or reviewing history  , and Communicating with other healthcare professionals .

## 2025-04-08 NOTE — PROGRESS NOTES
Progress Note - Trauma   Name: Naresh Carpio 64 y.o. male I MRN: [id number]  Unit/Bed#: [facility] 618-01 I Date of Admission: 4/3/2025   Date of Service: 4/8/2025 I Hospital Day: 5    Assessment & Plan  Traumatic retroperitoneal hemorrhage  - CTA CAP 4/2: right retroperitoneal hematoma with areas of active contrast extravasation   - S/p IR embolization of branch of the R deep circumflex iliac artery and R superior gluteal artery   - On heparin for DVT ppx   - Required 1u PRBC 4/4 and 1u PRBC 4/5; Post-transfusion hemoglobin today 7.0 - > 6.5  - Required 1u PRBC 4/7, Post-transfusion hemoglobin today 6.8 - > 7.2  - Hgb this morning pending, transfusions as needed  Chronic kidney disease-mineral and bone disorder  Lab Results   Component Value Date    EGFR 7 04/07/2025    EGFR 9 04/06/2025    EGFR 8 04/05/2025    CREATININE 7.10 (H) 04/07/2025    CREATININE 5.82 (H) 04/06/2025    CREATININE 6.42 (H) 04/05/2025     Wounds, multiple  - Bilateral foot wounds; R anterior shin wound  - Wound care consulted   L2 vertebral fracture (HCC)  - CT CAP 4/2: Fracture of L2 vertebral body extending to the superior endplate; seen on scan from 3/24 as well  - PT/OT; OOB and ambulation as able   Paroxysmal atrial fibrillation (HCC)  - Home Eliquis 5mg BID and aspirin 81mg currently held, restart when able  Chronic diastolic (congestive) heart failure (HCC)  Wt Readings from Last 3 Encounters:   04/03/25 100 kg (221 lb)   03/24/25 107 kg (234 lb 12.6 oz)   02/27/25 102 kg (224 lb 13.9 oz)     - Continue procardia xl 30mg   ESRD (end stage renal disease) on dialysis (HCC)  Lab Results   Component Value Date    EGFR 7 04/07/2025    EGFR 9 04/06/2025    EGFR 8 04/05/2025    CREATININE 7.10 (H) 04/07/2025    CREATININE 5.82 (H) 04/06/2025    CREATININE 6.42 (H) 04/05/2025   - On TTS dilaysis via permacath   - Dialysis today 4/8   - Sevelamer 1,600mg TID   Primary hypertension  - Continue home procardia   Type 2 diabetes mellitus, without  long-term current use of insulin (HCC)  Lab Results   Component Value Date    HGBA1C 5.7 (H) 02/09/2025       Recent Labs     04/07/25  0746 04/07/25  1311 04/07/25  1716 04/07/25  2105   POCGLU 95 92 93 108       Blood Sugar Average: Last 72 hrs:  (P) 117.8665753685702437  - Not currently requiring SSI. Continue to monitor   Acute pain due to trauma  - Patient maxing out current pain regiment (oxy 2.5/5, dilaudid 0.2mg q2h PRN)  - Increase oxy to 5/10  - APS consulted today   Pseudoaneurysm (HCC)  - Pelvic angiogram demonstrated pseudoaneurysms present at distal branches of the left inferior gluteal artery   - s/p artery embolization with coils and gelfoam with IR on 4/7  - Monitor hgb    Bowel Regimen: Senna-docusate  VTE Prophylaxis: Currently held with history of extravisation and pseudoaneurysm s/p embolization.     Disposition: Pending medical stability.    24 Hour Events : Received 1 unit of blood yesterday and underwent IR embolization of left inferior gluteal artery.  Monitor hemoglobin closely with labs pending this morning.    Subjective : 64 y.o.male, afebrile and hemodynamically stable. No acute events, no new complaints. Pain well controlled. Denies fevers, chills, CP, SOB, N/V, numbness or tingling.      Objective :  Temp:  [97.8 °F (36.6 °C)-98.8 °F (37.1 °C)] 98.2 °F (36.8 °C)  HR:  [67-87] 71  BP: (101-144)/(45-82) 122/47  Resp:  [14-21] 18  SpO2:  [95 %-100 %] 98 %  O2 Device: None (Room air)  Nasal Cannula O2 Flow Rate (L/min):  [2 L/min] 2 L/min    I/O         04/06 0701 04/07 0700 04/07 0701 04/08 0700    P.O. 240 480    I.V. (mL/kg)  200 (2)    Blood  350    Total Intake(mL/kg) 240 (2.4) 1030 (10.3)    Urine (mL/kg/hr) 0 (0)     Stool 0     Total Output 0     Net +240 +1030          Unmeasured Stool Occurrence 0 x           Lines/Drains/Airways       Active Status       Name Placement date Placement time Site Days    HD Permanent Double Catheter 08/30/23  --  Internal jugular  587                   Physical Exam  Vitals reviewed.   Constitutional:       Appearance: Normal appearance.   HENT:      Head: Normocephalic.   Eyes:      Extraocular Movements: Extraocular movements intact.   Cardiovascular:      Rate and Rhythm: Normal rate and regular rhythm.   Pulmonary:      Effort: Pulmonary effort is normal.   Abdominal:      Palpations: Abdomen is soft.   Musculoskeletal:      Cervical back: Normal range of motion.   Skin:     General: Skin is warm.      Capillary Refill: Capillary refill takes less than 2 seconds.   Neurological:      General: No focal deficit present.      Mental Status: He is alert. Mental status is at baseline.   Psychiatric:         Mood and Affect: Mood normal.         Behavior: Behavior normal.              Lab Results: I have reviewed the following results:  Recent Labs     04/07/25  0602 04/07/25  1126 04/07/25  1602   WBC 11.37*  --   --    HGB 6.5*  --  7.0*   HCT 21.4*  --  21.4*     --   --    SODIUM 129*  --   --    K 5.0  --   --    CL 93*  --   --    CO2 25  --   --    BUN 49*  --   --    CREATININE 7.10*  --   --    GLUC 84  --   --    INR  --  1.20*  --

## 2025-04-08 NOTE — ASSESSMENT & PLAN NOTE
Lab Results   Component Value Date    EGFR 5 04/08/2025    EGFR 7 04/07/2025    EGFR 9 04/06/2025    CREATININE 8.57 (H) 04/08/2025    CREATININE 7.10 (H) 04/07/2025    CREATININE 5.82 (H) 04/06/2025

## 2025-04-08 NOTE — ASSESSMENT & PLAN NOTE
Lab Results   Component Value Date    HGBA1C 5.7 (H) 02/09/2025       Recent Labs     04/07/25  0746 04/07/25  1311 04/07/25  1716 04/07/25  2105   POCGLU 95 92 93 108       Blood Sugar Average: Last 72 hrs:  (P) [id number]  - Not currently requiring SSI. Continue to monitor

## 2025-04-08 NOTE — ASSESSMENT & PLAN NOTE
[address] [facility] [address] Gastroenterology                                                                                                      Clinic H&P    Patient presents BP currently stable  Home regimen: Nifedipine 30 mg daily    Allergies, ROS:      Past Medical History:   Diagnosis Date   • Aortic regurgitation    • Arrhythmia    • Arthritis     per ng   • Congestive heart disease (HCC)    • Congestive heart failure (HCC)     per ng   • DJD (degenerative joint disease)    • Heart (ENTRESTO) 49-51 MG Oral Tab Take 1 tablet by mouth 2 (two) times daily. 180 tablet 3   • ASPIRIN EC LOW DOSE 81 MG Oral Tab EC Take 81 mg by mouth daily. • Omega-3 Fatty Acids (FISH OIL) 1200 MG Oral Cap Take  by mouth.  take 1 capsule by mouth twice breath sounds normal, no respiratory distress, wheezes or rales  GI: soft, non-tender exam in all quadrants without rigidity or guarding, non-distended, no abnormal bowel sounds noted, no masses are palpated  : no CVA tenderness  Skin: dry, warm, no emily If MAC @ [facility]/NE:    - Rj Bueno the night before and/or the day of the procedure(s)    - NO alcohol, recreational drugs nor erectile dysfunction medications 24 hours before procedure(s)   - NO herbal supplements or weight loss medications x

## 2025-04-08 NOTE — ASSESSMENT & PLAN NOTE
Large retroperitoneal hematoma s/p IR embolization on 4/3  L2 vertebral fracture      Plan:  Acetaminophen 975 mg p.o. every 8 hours scheduled.  Gabapentin 100 mg p.o. 3 times weekly.  Methocarbamol 750 mg p.o. every 6 hours scheduled.  Lidocaine patch, on for 12 hours and off for 12 hours as needed for mild local pain.  Oxycodone 5 mg p.o. every 3 hours as needed moderate pain or 10 mg p.o. every 3 hours as needed severe pain.  Change hydromorphone to 0.5 mg IV every 6 hours as needed breakthrough pain.  Patient was encouraged to attempt to avoid IV opioids to ensure that an oral opioid regimen will be effective at discharge.  Bowel regimen to avoid opioid-induced constipation while on opioid pain medication.  As needed Narcan in the event that opioid reversal becomes necessary.  Ice to painful area for up to 20 minutes every hour as needed.

## 2025-04-08 NOTE — ASSESSMENT & PLAN NOTE
- Pelvic angiogram demonstrated pseudoaneurysms present at distal branches of the left inferior gluteal artery   - s/p artery embolization with coils and gelfoam with IR on 4/7  - Monitor hgb

## 2025-04-08 NOTE — PROGRESS NOTES
Progress Note -Interventional Radiology  Naresh Carpio 64 y.o. male MRN: [id number]  Unit/Bed#: [facility] 618-01 Encounter: [id number]    Assessment/Plan:  Patient is a 64-year-old male with ESRD on HD, HTN, DM, A-fib on Eliquis, recent admission for fall with L2 compression fracture who presented to the ED on 4/2 with right-sided pain.  Patient found to have large right retroperitoneal hematoma with active extravasation and drop in hemoglobin.  Patient was transfused, Kcentra given, and transferred to [facility] for emergent IR intervention.  Patient s/p IR embolization of right deep circumflex iliac artery and superior gluteal artery on 4/3.  On 4/6, IR consulted for continued drop in hemoglobin and CT showing 5 mm inferior gluteal artery branch pseudoaneurysm.  Patient now s/p IR pelvic angiogram on 4/7, yesterday, demonstrating pseudoaneurysms at the distal branches of the left inferior gluteal artery that was embolized with coils and Gelfoam.    -Patient reports feeling tired today. Persistent abdominal pain, but improves with pain medication and slightly better compared to yesterday. Some buttock soreness, but no new pain, groin site pain or swelling, new numbness or tingling.  -Right groin access site with dressing, CDI.  Mild tenderness to palpation.  No surrounding swelling, erythema, ecchymosis or hematoma noted.  Soft.  -Patient has been afebrile, VSS. WBC similar to prior at 11.97, Hgb stable with trend from yesterday morning 6.5 - > 7.0 -> 7.0 and no further transfusion since yesterday morning prior to IR procedure  -Continue to monitor right groin access site for any increased pain, swelling, bleeding, expanding hematoma  -Trend hgb, transfuse as per primary team  -Appreciate nephrology recommendations - patient had dialysis this AM  -Remainder of care per primary team  -Please reach out to IR with any questions/concerns      Patient Active Problem List   Diagnosis    Anemia due to chronic kidney disease, on  chronic dialysis (HCC)    Type 2 diabetes mellitus, without long-term current use of insulin (HCC)    Hypertension    History of TIA (transient ischemic attack)    T10 vertebral fracture (HCC)    Diarrhea    Hypervolemia    Hemodialysis status (HCC)    Left hip pain    Severe Left Orchalgia    Right shoulder pain    Osteoarthritis of left hip    Status post fall    Traumatic rhabdomyolysis (HCC)    Open wound of right foot    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 mellitus, with bone involvement without evidence of necrosis (HCC)    Diabetic ulcer of left midfoot associated with type 2 diabetes mellitus, limited to breakdown of skin (HCC)    Diabetic polyneuropathy associated with type 2 diabetes mellitus (HCC)    Diabetes mellitus type 2 with peripheral artery disease (HCC)    History of amputation of lesser toe of left foot (HCC)    Onychomycosis    Subacute osteomyelitis of right foot (HCC)    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 mellitus, with necrosis of bone (HCC)    Acquired deformity of foot, right    Charcot's joint    Polymicrobial bacterial infection    Cellulitis of right foot    Constipation    Facial cellulitis    History of amputation of hallux ([facility])    Difficulty with speech    Hyperkalemia    Closed fracture of right pelvis (HCC)    Bilateral sacral fracture, closed (HCC)    Falls    PAD (peripheral artery disease) ([facility])    Chronic kidney disease-mineral and bone disorder    Hyponatremia    Nausea    ESRD (end stage renal disease) ([facility])    Paroxysmal atrial fibrillation (HCC)    Fall    Melena    Chronic diastolic (congestive) heart failure (HCC)    Pleural effusion    Elevated troponin    Chronic wound    Renal lesion    ESRD (end stage renal disease) on dialysis (HCC)    Primary hypertension    Anemia in ESRD (end-stage renal disease)  ([facility])    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 male who presented with right-sided pain. Patient s/p IR embolization of right deep circumflex iliac artery and superior gluteal artery on 4/3.  On 4/6, IR consulted for continued drop in hemoglobin and CT showing 5 mm inferior gluteal artery branch pseudoaneurysm.  Patient now s/p IR pelvic angiogram on 4/7, with embolization fo pseudoaneurysms at the distal branches of the left inferior gluteal artery. Patient reports feeling tired today. Persistent abdominal pain, but improves with pain medication and slightly better compared to yesterday. Some buttock soreness, but no new pain, groin site pain or swelling, new numbness or tingling.    Objective:    Vitals:  /67 (BP Location: Left arm)   Pulse 86   Temp 97.6 °F (36.4 °C) (Temporal)   Resp 16   Ht 6' 1\" (1.854 m)   Wt 100 kg (221 lb 1.9 oz)   SpO2 92%   BMI 29.17 kg/m²   Body mass index is 29.17 kg/m².  Weight (last 2 days)       Date/Time Weight    04/08/25 1200 100 (221.12)    04/08/25 0750 102 (225.97)     Weight: with back brace at 04/08/25 0750            I/Os:    Intake/Output Summary (Last 24 hours) at 4/8/2025 1405  Last data filed at 4/8/2025 1200  Gross per 24 hour   Intake 980 ml   Output 2500 ml   Net -1520 ml       Invasive Devices       Peripheral Intravenous Line  Duration             Peripheral IV 04/06/25 Right;Ventral (anterior) Forearm 2 days    Peripheral IV 04/07/25 Distal;Dorsal (posterior);Left Forearm 1 " "day              Hemodialysis Catheter  Duration             HD Permanent Double Catheter 587 days                    Physical Exam:  /67 (BP Location: Left arm)   Pulse 86   Temp 97.6 °F (36.4 °C) (Temporal)   Resp 16   Ht 6' 1\" (1.854 m)   Wt 100 kg (221 lb 1.9 oz)   SpO2 92%   BMI 29.17 kg/m²   General appearance: alert and oriented, in no acute distress  Head: Normocephalic, without obvious abnormality  Lungs:  Equal chest rise bilaterally, no work of breathing  Heart: regular rate and rhythm  Abdomen:  Soft, diffusely tender  Right groin access site with dressing, CDI.  Mild tenderness to palpation.  No surrounding swelling, erythema, ecchymosis or hematoma noted.  Soft.  Grossly neurovascular intact in bilateral lower extremities.                Lab Results and Cultures:   CBC with diff:   Lab Results   Component Value Date    WBC 11.97 (H) 04/08/2025    HGB 7.0 (L) 04/08/2025    HCT 21.6 (L) 04/08/2025    MCV 93 04/08/2025     04/08/2025    RBC 2.33 (L) 04/08/2025    MCH 30.0 04/08/2025    MCHC 32.4 04/08/2025    RDW 17.2 (H) 04/08/2025    MPV 9.2 04/08/2025    NRBC 0 04/08/2025      BMP/CMP:  Lab Results   Component Value Date    K 5.6 (H) 04/08/2025    CL 93 (L) 04/08/2025    CO2 23 04/08/2025    BUN 62 (H) 04/08/2025    CREATININE 8.57 (H) 04/08/2025    CALCIUM 8.9 04/08/2025    AST 14 04/03/2025    ALT 7 04/03/2025    ALKPHOS 59 04/03/2025    EGFR 5 04/08/2025   ,     Coags:   Lab Results   Component Value Date    PTT 29 04/03/2025    INR 1.20 (H) 04/07/2025   ,   Results from last 7 days   Lab Units 04/07/25  1126 04/03/25  0148 04/02/25 2024   PTT seconds  --  29 35*   INR  1.20* 1.51* 1.61*        Lipid Panel: No results found for: \"CHOL\"  Lab Results   Component Value Date    HDL 37 (L) 10/08/2024     Lab Results   Component Value Date    HDL 37 (L) 10/08/2024     Lab Results   Component Value Date    LDLCALC 70 10/08/2024     Lab Results   Component Value Date    TRIG 85 " 10/08/2024       HgbA1c:   Lab Results   Component Value Date    HGBA1C 5.7 (H) 02/09/2025    HGBA1C 5.5 10/08/2024    HGBA1C 6.2 (H) 07/02/2024       Blood Culture:   Lab Results   Component Value Date    BLOODCX No Growth After 5 Days. 03/12/2024    BLOODCX No Growth After 5 Days. 03/12/2024   ,   Urinalysis:   Lab Results   Component Value Date    COLORU Light Yellow 02/08/2025    CLARITYU Clear 02/08/2025    SPECGRAV 1.020 02/08/2025    PHUR 7.0 02/08/2025    LEUKOCYTESUR Moderate (A) 02/08/2025    NITRITE Negative 02/08/2025    GLUCOSEU 70 (7/100%) (A) 02/08/2025    KETONESU Negative 02/08/2025    BILIRUBINUR Negative 02/08/2025    BLOODU Trace (A) 02/08/2025   ,   Urine Culture:   Lab Results   Component Value Date    URINECX 30,000-39,000 cfu/ml 11/08/2024   ,   Wound Culure:    Lab Results   Component Value Date    WOUNDCULT 2+ Growth of Staphylococcus aureus (A) 03/18/2024       VTE Pharmacologic Prophylaxis: RX contraindicated due to: extravasation and pseudoaneurysm      Thank you for allowing me to participate in the care of Naresh Carpio. Please don't hesitate to call, text, email, or TigerText with any questions.     This text is generated with voice recognition software. There may be translation, syntax,  or grammatical errors. If you have any questions, please contact the dictating provider.

## 2025-04-08 NOTE — CASE MANAGEMENT
Case Management Discharge Planning Note    Patient name Naresh Carpio  Location [facility] 618-01 MRN [id number]  : 1960 Date 2025       Current Admission Date: 4/3/2025  Current Admission Diagnosis:Traumatic retroperitoneal hemorrhage   Patient Active Problem List    Diagnosis Date Noted Date Diagnosed    Pseudoaneurysm ([facility]) 2025     Acute pain due to trauma 2025     ESRD on dialysis ([facility]) 2025     Wounds, multiple 2025     Traumatic retroperitoneal hemorrhage 2025     Secondary hyperparathyroidism ([facility]) 2025     At risk for electrolyte imbalance 2025     Multiple open wounds of foot 2025     Disorder of acid-base balance 2025     L2 vertebral fracture ([facility]) 2025     Non-compliance with treatment 2025     Generalized weakness 2025     Gout 2025     Closed fracture of proximal end of left humerus with routine healing 02/10/2025     Left shoulder pain 2025     ESRD (end stage renal disease) on dialysis ([facility]) 2025     Primary hypertension 2025     Anemia in ESRD (end-stage renal disease)  ([facility]) 2025     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, on chronic dialysis ([facility]) 2025     Renal lesion 2024     Chronic wound 2024     Chronic diastolic (congestive) heart failure ([facility]) 2024     Pleural effusion 2024     Elevated troponin 2024     Fall 2024     Melena 2024     Paroxysmal atrial fibrillation ([facility])      ESRD (end stage renal disease) ([facility]) 10/25/2024     Nausea 10/20/2024     Hyponatremia 10/19/2024     Chronic kidney disease-mineral and bone disorder 10/14/2024     Falls 10/08/2024     PAD (peripheral artery disease) ([facility]) 10/08/2024     Closed fracture of right pelvis ([facility]) 2024     Bilateral sacral fracture, closed ([facility]) 2024     Hyperkalemia 2024     Difficulty with speech  03/19/2024     History of amputation of hallux (HCC) 03/18/2024     Facial cellulitis 03/12/2024     Constipation 09/06/2023     Cellulitis of right foot      Polymicrobial bacterial infection 08/24/2023     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 mellitus, with necrosis of bone (HCC)      Acquired deformity of foot, right      Charcot's joint      Subacute osteomyelitis of right foot (HCC)      Open wound of right foot 08/21/2023     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 mellitus, with bone involvement without evidence of necrosis (HCC)      Diabetic ulcer of left midfoot associated with type 2 diabetes mellitus, limited to breakdown of skin (HCC)      Diabetic polyneuropathy associated with type 2 diabetes mellitus (HCC)      Diabetes mellitus type 2 with peripheral artery disease (HCC)      History of amputation of lesser toe of left foot ([facility])      Onychomycosis      Status post fall 08/19/2023     Traumatic rhabdomyolysis (HCC) 08/19/2023     Osteoarthritis of left hip 07/27/2022     Severe Left Orchalgia 07/26/2022     Right shoulder pain 07/26/2022     Left hip pain 07/06/2022     Hemodialysis status ([facility])      Hypervolemia      Diarrhea 01/22/2022     Anemia due to chronic kidney disease, on chronic dialysis (HCC) 01/21/2022     Type 2 diabetes mellitus, without long-term current use of insulin (HCC)      Hypertension      History of TIA (transient ischemic attack)      T10 vertebral fracture ([facility])        LOS (days): 5  Geometric Mean LOS (GMLOS) (days): 6.9  Days to GMLOS:1.3     OBJECTIVE:  Risk of Unplanned Readmission Score: 67.9         Current admission status: Inpatient   Preferred Pharmacy:   [facility] [address] [place] [address] [address] [place] [address]  [address] [address]  Phone: [phone] Fax: [phone]    Primary Care Provider: Jeffrey Jackson    Primary Insurance: MEDICARE  Secondary Insurance:     DISCHARGE  DETAILS:    CM spoke to liaisons from both [facility], and [facility] [place] and discussed the pt's case.  They have the pt's documents and are currently reviewing

## 2025-04-08 NOTE — PROGRESS NOTES
Progress Note - Nephrology   Name: Naresh Carpio 64 y.o. male I MRN: [id number]  Unit/Bed#: PPHP 618-01 I Date of Admission: 4/3/2025   Date of Service: 4/8/2025 I Hospital Day: 5       HEMODIALYSIS PROCEDURE NOTE  The patient was seen and examined on hemodialysis.  Time: 4 hours  Sodium: 138 Blood flow: 350   Dialyzer: F160 Potassium: 2K Dialysate flow: 500   Access: Right IJ PermCath Bicarbonate: 35 Ultrafiltration goal: 2 L   Medications on HD: Epo 5,000 units after HD   Patient seen while on HD.  Denies pain.  Tolerating well.  No complaints or concerns at this time  Assessment & Plan  ESRD (end stage renal disease) on dialysis (HCC)  TTS at [facility] IN [place]  Access: Right IJ permcath  EDW: 99 kg per previous hospital records  Continue with current dialysis schedule  Due for HD Thursday 4/10/25  Traumatic retroperitoneal hemorrhage  History of a fall onto a rail a few days ago. Continued to have progressive pain  Presented to [place] ED, found to have retroperitoneal hemorrhage with areas of active contrast extravasation. Transferred to [facility]  Required 1u PRBC 4/7, Post-transfusion hemoglobin today 7.0  Management per primary service  Primary hypertension  BP currently stable  Home regimen: Nifedipine 30 mg daily   Anemia due to chronic kidney disease, on chronic dialysis (HCC)  Start SURINDER therapy 5000 units q. HD  Current hemoglobin 7.0 - s/p 1u PRBC 4/7  May require further imaging as per primary service  At risk for electrolyte imbalance  Recent potassium 5.6, calcium 8.9   HD TTS  Chronic diastolic (congestive) heart failure (HCC)  Echo Oct. 2024 with LVEF 60-65%  Monitor volume status   UF with HD  Chronic kidney disease-mineral and bone disorder  Home regimen: Renagel 800 mg TID per chart review  Most recent Phosphorus 4.9  Paroxysmal atrial fibrillation (HCC)  Per critical care  L2 vertebral fracture (HCC)  As per primary service.    Review of Systems  Patient seen and examined while receiving HD. Denies pain,  N/V/D. Tolerating HD. On RA.     Review of Systems   Constitutional:  Negative for chills and fever.   Respiratory:  Negative for cough and shortness of breath.    Cardiovascular:  Positive for leg swelling. Negative for chest pain and palpitations.   Gastrointestinal:  Negative for abdominal pain, constipation, diarrhea, nausea and vomiting.   Skin:  Negative for color change.   Neurological:  Negative for dizziness, light-headedness and headaches.   All other systems reviewed and are negative.        Physical Exam:  Current Weight: Weight - Scale: 102 kg (225 lb 15.5 oz) (with back brace)  Vitals:    04/08/25 0900 04/08/25 0930 04/08/25 1000 04/08/25 1030   BP: 130/63 109/54 126/54 143/65   BP Location:       Pulse: 74 69 73 80   Resp: 15 16 15 16   Temp:       TempSrc:       SpO2:       Weight:       Height:           Physical Exam  Vitals and nursing note reviewed. Exam conducted with a chaperone present (HD nurse next to chair).   Constitutional:       General: He is not in acute distress.     Appearance: He is ill-appearing.   HENT:      Head: Normocephalic.      Mouth/Throat:      Mouth: Mucous membranes are moist.   Cardiovascular:      Rate and Rhythm: Normal rate.      Pulses: Normal pulses.      Heart sounds: No murmur heard.     No gallop.   Pulmonary:      Effort: Pulmonary effort is normal. No respiratory distress.      Breath sounds: Normal breath sounds. No wheezing.   Abdominal:      General: Bowel sounds are normal. There is no distension.      Palpations: Abdomen is soft.      Tenderness: There is no abdominal tenderness.   Musculoskeletal:      Right lower leg: Edema present.      Left lower leg: Edema present.   Skin:     General: Skin is warm and dry.      Capillary Refill: Capillary refill takes 2 to 3 seconds.   Neurological:      Mental Status: He is alert and oriented to person, place, and time. Mental status is at baseline.   Psychiatric:         Mood and Affect: Mood normal.          Behavior: Behavior normal.        Medications:    Current Facility-Administered Medications:     acetaminophen (TYLENOL) tablet 975 mg, 975 mg, Oral, Q8H KEMAL, Nallely Ayesha, PA-C, 975 mg at 04/08/25 0503    allopurinol (ZYLOPRIM) tablet 100 mg, 100 mg, Oral, Daily, Nallely Ayesha, PA-C, 100 mg at 04/07/25 0822    atorvastatin (LIPITOR) tablet 80 mg, 80 mg, Oral, Daily With Dinner, Nallely Ayesha, PA-C, 80 mg at 04/07/25 1726    chlorhexidine (PERIDEX) 0.12 % oral rinse 15 mL, 15 mL, Mouth/Throat, Q12H KEMAL, Nallely Ayesha, PA-C, 15 mL at 04/07/25 2159    epoetin jessica (EPOGEN,PROCRIT) injection 3,000 Units, 3,000 Units, Intravenous, After Dialysis, 3,000 Units at 04/08/25 1018 **AND** epoetin jessica (EPOGEN,PROCRIT) injection 2,000 Units, 2,000 Units, Intravenous, After Dialysis, Anthony Mixon DO, 2,000 Units at 04/08/25 1018    gabapentin (NEURONTIN) capsule 100 mg, 100 mg, Oral, Once per day on Monday Wednesday Friday, Nallely Ayesha, PA-C, 100 mg at 04/07/25 0822    HYDROmorphone (DILAUDID) injection 0.5 mg, 0.5 mg, Intravenous, Q2H PRN, Daryl Segal MD, 0.5 mg at 04/08/25 0503    lidocaine (LIDODERM) 5 % patch 1 patch, 1 patch, Topical, Daily PRN, Nallely Ayesha, PA-C, 1 patch at 04/05/25 1146    methocarbamol (ROBAXIN) tablet 750 mg, 750 mg, Oral, Q6H KEMAL, Daryl Segal MD, 750 mg at 04/08/25 0503    naloxone (NARCAN) 0.04 mg/mL syringe 0.04 mg, 0.04 mg, Intravenous, Q1MIN PRN, Nallely Ayesha, PA-C    NIFEdipine (PROCARDIA XL) 24 hr tablet 30 mg, 30 mg, Oral, After Dinner, GRANT Zuluaga    oxyCODONE (ROXICODONE) IR tablet 5 mg, 5 mg, Oral, Q3H PRN **OR** oxyCODONE (ROXICODONE) immediate release tablet 10 mg, 10 mg, Oral, Q3H PRN, Daryl Segal MD, 10 mg at 04/08/25 0342    senna-docusate sodium (SENOKOT S) 8.6-50 mg per tablet 1 tablet, 1 tablet, Oral, HS, Nallely Hodge PA-C, 1 tablet at 04/07/25 3445    sevelamer (RENAGEL) tablet 1,600 mg, 1,600 mg,  "Oral, TID With Meals, Nallely Hodge PA-C, 1,600 mg at 04/07/25 1726    Laboratory Results:  Results from last 7 days   Lab Units 04/08/25  0801 04/07/25  1602 04/07/25  0602 04/06/25  0910 04/06/25  0109 04/05/25  1947 04/05/25  1410 04/05/25  0840 04/04/25  1857 04/04/25  0628 04/03/25  1524 04/03/25  0545 04/03/25  0148 04/02/25 2024   WBC Thousand/uL 11.97*  --  11.37* 11.08*  --   --   --   --   --  8.27  --  8.74 9.65 9.24   HEMOGLOBIN g/dL 7.0* 7.0* 6.5* 7.2* 7.2* 7.7* 6.9* 7.2*   < > 6.5*   < > 7.5* 6.5* 7.2*   HEMATOCRIT % 21.6* 21.4* 21.4* 22.5* 22.0* 23.4* 21.1* 22.0*   < > 20.9*  --  23.7* 20.7* 23.4*   PLATELETS Thousands/uL 292  --  312 282  --   --   --   --   --  267  --  264 267 340   SODIUM mmol/L 129*  --  129* 131*  --   --   --  130*  --  133*  --  133* 132* 131*   POTASSIUM mmol/L 5.6*  --  5.0 4.6  --   --   --  4.5  --  4.2  --  5.3 5.0 5.4*   CHLORIDE mmol/L 93*  --  93* 93*  --   --   --  91*  --  95*  --  94* 93* 91*   CO2 mmol/L 23  --  25 28  --   --   --  28  --  27  --  27 22 19*   BUN mg/dL 62*  --  49* 39*  --   --   --  47*  --  33*  --  63* 58* 55*   CREATININE mg/dL 8.57*  --  7.10* 5.82*  --   --   --  6.42*  --  4.75*  --  7.00* 6.78* 6.60*   CALCIUM mg/dL 8.9  --  8.9 9.1  --   --   --  9.0  --  8.9  --  8.5 8.2* 9.1   MAGNESIUM mg/dL  --   --   --   --   --   --   --   --   --  2.2  --  2.2 2.1  --    PHOSPHORUS mg/dL  --   --   --   --   --   --   --   --   --  4.9*  --  8.3* 7.7*  --     < > = values in this interval not displayed.       Medical records have been reviewed through [facility] and care everywhere for this patient encounter    Portions of the record may have been created with voice recognition software. Occasional wrong word or \"sound a like\" substitutions may have occurred due to the inherent limitations of voice recognition software. Read the chart carefully and recognize,   "

## 2025-04-08 NOTE — PLAN OF CARE
Post-Dialysis RN Treatment Note    Blood Pressure:  Pre 111/48 mm/Hg  Post 136/67 mmHg   EDW:  99 kg    Weight:  Pre 102.5 kg   Post 100.3 kg   Mode of weight measurement: Standing Scale while wearing the back brace   Volume Removed:  2,000 ml    Treatment duration: 240 minutes    NS given:  No    Treatment shortened No   Medications given during Rx: Epogen 5,000u IV   Estimated Kt/V:  0.87   Access type: Permacath/TDC   Needle Gauge:  n/a   Access Issues: No    Report called to primary nurse:   Yes, via ESC to QL RN      Started patient on hemodialysis treatment with UF goal of 1L net as tolerated x 4 hours x 2K bath for serum k+ of 5.0 on 4/7/25.    Problem: METABOLIC, FLUID AND ELECTROLYTES - ADULT  Goal: Electrolytes maintained within normal limits  Description: INTERVENTIONS:- Monitor labs and assess patient for signs and symptoms of electrolyte imbalances- Administer electrolyte replacement as ordered- Monitor response to electrolyte replacements, including repeat lab results as appropriate- Instruct patient on fluid and nutrition as appropriate  Outcome: Progressing  Goal: Fluid balance maintained  Description: INTERVENTIONS:- Monitor labs - Monitor I/O and WT- Instruct patient on fluid and nutrition as appropriate- Assess for signs & symptoms of volume excess or deficit  Outcome: Progressing

## 2025-04-08 NOTE — ASSESSMENT & PLAN NOTE
Start SURINDER therapy 5000 units q. HD  Current hemoglobin 7.0 - s/p 1u PRBC 4/7  May require further imaging as per primary service

## 2025-04-08 NOTE — ASSESSMENT & PLAN NOTE
Lab Results   Component Value Date    EGFR 7 04/07/2025    EGFR 9 04/06/2025    EGFR 8 04/05/2025    CREATININE 7.10 (H) 04/07/2025    CREATININE 5.82 (H) 04/06/2025    CREATININE 6.42 (H) 04/05/2025   - On TTS dilaysis via permacath   - Dialysis today 4/8   - Sevelamer 1,600mg TID

## 2025-04-08 NOTE — PHYSICAL THERAPY NOTE
Physical Therapy Cancellation Note       04/08/25 1948   Note Type   Note Type Cancelled Session   Cancel Reasons Refusal  (pt declined participation this PM when attemtped after HD.  Reports residual abdominal pain after procedure yesterday.  Appears comfortable and in no distress otherwise.)         Terry Anna, PTA

## 2025-04-08 NOTE — ASSESSMENT & PLAN NOTE
- CTA CAP 4/2: right retroperitoneal hematoma with areas of active contrast extravasation   - S/p IR embolization of branch of the R deep circumflex iliac artery and R superior gluteal artery   - On heparin for DVT ppx   - Required 1u PRBC 4/4 and 1u PRBC 4/5; Post-transfusion hemoglobin today 7.0 - > 6.5  - Required 1u PRBC 4/7, Post-transfusion hemoglobin today 6.8 - > 7.2  - Hgb this morning pending, transfusions as needed

## 2025-04-09 PROBLEM — D64.9 ACUTE ON CHRONIC ANEMIA: Status: ACTIVE | Noted: 2024-10-14

## 2025-04-09 LAB
ANION GAP SERPL CALCULATED.3IONS-SCNC: 12 MMOL/L (ref 4–13)
BASOPHILS # BLD AUTO: 0.05 THOUSANDS/ÂΜL (ref 0–0.1)
BASOPHILS NFR BLD AUTO: 1 % (ref 0–1)
BUN SERPL-MCNC: 34 MG/DL (ref 5–25)
CALCIUM SERPL-MCNC: 8.9 MG/DL (ref 8.4–10.2)
CHLORIDE SERPL-SCNC: 94 MMOL/L (ref 96–108)
CO2 SERPL-SCNC: 26 MMOL/L (ref 21–32)
CREAT SERPL-MCNC: 5.53 MG/DL (ref 0.6–1.3)
EOSINOPHIL # BLD AUTO: 0.41 THOUSAND/ÂΜL (ref 0–0.61)
EOSINOPHIL NFR BLD AUTO: 5 % (ref 0–6)
ERYTHROCYTE [DISTWIDTH] IN BLOOD BY AUTOMATED COUNT: 16.7 % (ref 11.6–15.1)
GFR SERPL CREATININE-BSD FRML MDRD: 9 ML/MIN/1.73SQ M
GLUCOSE SERPL-MCNC: 115 MG/DL (ref 65–140)
GLUCOSE SERPL-MCNC: 134 MG/DL (ref 65–140)
GLUCOSE SERPL-MCNC: 98 MG/DL (ref 65–140)
GLUCOSE SERPL-MCNC: 99 MG/DL (ref 65–140)
HCT VFR BLD AUTO: 23.5 % (ref 36.5–49.3)
HGB BLD-MCNC: 7.3 G/DL (ref 12–17)
IMM GRANULOCYTES # BLD AUTO: 0.12 THOUSAND/UL (ref 0–0.2)
IMM GRANULOCYTES NFR BLD AUTO: 1 % (ref 0–2)
LYMPHOCYTES # BLD AUTO: 0.98 THOUSANDS/ÂΜL (ref 0.6–4.47)
LYMPHOCYTES NFR BLD AUTO: 11 % (ref 14–44)
MAGNESIUM SERPL-MCNC: 2.2 MG/DL (ref 1.9–2.7)
MCH RBC QN AUTO: 28.9 PG (ref 26.8–34.3)
MCHC RBC AUTO-ENTMCNC: 31.1 G/DL (ref 31.4–37.4)
MCV RBC AUTO: 93 FL (ref 82–98)
MONOCYTES # BLD AUTO: 1.19 THOUSAND/ÂΜL (ref 0.17–1.22)
MONOCYTES NFR BLD AUTO: 13 % (ref 4–12)
NEUTROPHILS # BLD AUTO: 6.23 THOUSANDS/ÂΜL (ref 1.85–7.62)
NEUTS SEG NFR BLD AUTO: 69 % (ref 43–75)
NRBC BLD AUTO-RTO: 0 /100 WBCS
PHOSPHATE SERPL-MCNC: 4.3 MG/DL (ref 2.3–4.1)
PLATELET # BLD AUTO: 327 THOUSANDS/UL (ref 149–390)
PMV BLD AUTO: 9.1 FL (ref 8.9–12.7)
POTASSIUM SERPL-SCNC: 4.1 MMOL/L (ref 3.5–5.3)
RBC # BLD AUTO: 2.53 MILLION/UL (ref 3.88–5.62)
SODIUM SERPL-SCNC: 132 MMOL/L (ref 135–147)
WBC # BLD AUTO: 8.98 THOUSAND/UL (ref 4.31–10.16)

## 2025-04-09 PROCEDURE — 82948 REAGENT STRIP/BLOOD GLUCOSE: CPT

## 2025-04-09 PROCEDURE — 85025 COMPLETE CBC W/AUTO DIFF WBC: CPT | Performed by: PHYSICIAN ASSISTANT

## 2025-04-09 PROCEDURE — 97530 THERAPEUTIC ACTIVITIES: CPT

## 2025-04-09 PROCEDURE — 80048 BASIC METABOLIC PNL TOTAL CA: CPT | Performed by: PHYSICIAN ASSISTANT

## 2025-04-09 PROCEDURE — 97535 SELF CARE MNGMENT TRAINING: CPT

## 2025-04-09 PROCEDURE — 99232 SBSQ HOSP IP/OBS MODERATE 35: CPT | Performed by: INTERNAL MEDICINE

## 2025-04-09 PROCEDURE — 83735 ASSAY OF MAGNESIUM: CPT | Performed by: PHYSICIAN ASSISTANT

## 2025-04-09 PROCEDURE — 99232 SBSQ HOSP IP/OBS MODERATE 35: CPT | Performed by: SURGERY

## 2025-04-09 PROCEDURE — 97116 GAIT TRAINING THERAPY: CPT

## 2025-04-09 PROCEDURE — 99233 SBSQ HOSP IP/OBS HIGH 50: CPT | Performed by: PHYSICIAN ASSISTANT

## 2025-04-09 PROCEDURE — 97112 NEUROMUSCULAR REEDUCATION: CPT

## 2025-04-09 PROCEDURE — 84100 ASSAY OF PHOSPHORUS: CPT | Performed by: PHYSICIAN ASSISTANT

## 2025-04-09 RX ORDER — HEPARIN SODIUM 5000 [USP'U]/ML
5000 INJECTION, SOLUTION INTRAVENOUS; SUBCUTANEOUS EVERY 8 HOURS SCHEDULED
Status: DISCONTINUED | OUTPATIENT
Start: 2025-04-09 | End: 2025-04-10

## 2025-04-09 RX ADMIN — OXYCODONE HYDROCHLORIDE 10 MG: 10 TABLET ORAL at 08:46

## 2025-04-09 RX ADMIN — METHOCARBAMOL 750 MG: 750 TABLET ORAL at 23:45

## 2025-04-09 RX ADMIN — CHLORHEXIDINE GLUCONATE 0.12% ORAL RINSE 15 ML: 1.2 LIQUID ORAL at 21:43

## 2025-04-09 RX ADMIN — ACETAMINOPHEN 975 MG: 325 TABLET, FILM COATED ORAL at 13:40

## 2025-04-09 RX ADMIN — GABAPENTIN 100 MG: 100 CAPSULE ORAL at 08:47

## 2025-04-09 RX ADMIN — CHLORHEXIDINE GLUCONATE 0.12% ORAL RINSE 15 ML: 1.2 LIQUID ORAL at 08:47

## 2025-04-09 RX ADMIN — OXYCODONE HYDROCHLORIDE 10 MG: 10 TABLET ORAL at 18:24

## 2025-04-09 RX ADMIN — METHOCARBAMOL 750 MG: 750 TABLET ORAL at 11:25

## 2025-04-09 RX ADMIN — OXYCODONE HYDROCHLORIDE 10 MG: 10 TABLET ORAL at 13:40

## 2025-04-09 RX ADMIN — SEVELAMER HYDROCHLORIDE 1600 MG: 800 TABLET ORAL at 08:47

## 2025-04-09 RX ADMIN — SEVELAMER HYDROCHLORIDE 1600 MG: 800 TABLET ORAL at 18:24

## 2025-04-09 RX ADMIN — NIFEDIPINE 30 MG: 30 TABLET, FILM COATED, EXTENDED RELEASE ORAL at 18:24

## 2025-04-09 RX ADMIN — HEPARIN SODIUM 5000 UNITS: 5000 INJECTION INTRAVENOUS; SUBCUTANEOUS at 21:42

## 2025-04-09 RX ADMIN — SEVELAMER HYDROCHLORIDE 1600 MG: 800 TABLET ORAL at 11:25

## 2025-04-09 RX ADMIN — METHOCARBAMOL 750 MG: 750 TABLET ORAL at 05:00

## 2025-04-09 RX ADMIN — METHOCARBAMOL 750 MG: 750 TABLET ORAL at 18:24

## 2025-04-09 RX ADMIN — ACETAMINOPHEN 975 MG: 325 TABLET, FILM COATED ORAL at 21:42

## 2025-04-09 RX ADMIN — ALLOPURINOL 100 MG: 100 TABLET ORAL at 08:46

## 2025-04-09 RX ADMIN — ACETAMINOPHEN 975 MG: 325 TABLET, FILM COATED ORAL at 05:00

## 2025-04-09 RX ADMIN — SENNOSIDES AND DOCUSATE SODIUM 1 TABLET: 50; 8.6 TABLET ORAL at 21:42

## 2025-04-09 RX ADMIN — ATORVASTATIN CALCIUM 80 MG: 80 TABLET, FILM COATED ORAL at 18:24

## 2025-04-09 RX ADMIN — OXYCODONE HYDROCHLORIDE 10 MG: 10 TABLET ORAL at 04:57

## 2025-04-09 RX ADMIN — OXYCODONE HYDROCHLORIDE 10 MG: 10 TABLET ORAL at 21:44

## 2025-04-09 RX ADMIN — HEPARIN SODIUM 5000 UNITS: 5000 INJECTION INTRAVENOUS; SUBCUTANEOUS at 11:25

## 2025-04-09 NOTE — ASSESSMENT & PLAN NOTE
Wt Readings from Last 3 Encounters:   04/08/25 100 kg (221 lb 1.9 oz)   03/24/25 107 kg (234 lb 12.6 oz)   02/27/25 102 kg (224 lb 13.9 oz)     - Continue procardia xl 30mg   - No acute exacerbation

## 2025-04-09 NOTE — PLAN OF CARE
Problem: PAIN - ADULT  Goal: Verbalizes/displays adequate comfort level or baseline comfort level  Description: Interventions:- Encourage patient to monitor pain and request assistance- Assess pain using appropriate pain scale- Administer analgesics based on type and severity of pain and evaluate response- Implement non-pharmacological measures as appropriate and evaluate response- Consider cultural and social influences on pain and pain management- Notify physician/advanced practitioner if interventions unsuccessful or patient reports new pain  Outcome: Progressing     Problem: INFECTION - ADULT  Goal: Absence or prevention of progression during hospitalization  Description: INTERVENTIONS:- Assess and monitor for signs and symptoms of infection- Monitor lab/diagnostic results- Monitor all insertion sites, i.e. indwelling lines, tubes, and drains- Monitor endotracheal if appropriate and nasal secretions for changes in amount and color- [place] appropriate cooling/warming therapies per order- Administer medications as ordered- Instruct and encourage patient and family to use good hand hygiene technique- Identify and instruct in appropriate isolation precautions for identified infection/condition  Outcome: Progressing     Problem: Knowledge Deficit  Goal: Patient/family/caregiver demonstrates understanding of disease process, treatment plan, medications, and discharge instructions  Description: Complete learning assessment and assess knowledge base.Interventions:- Provide teaching at level of understanding- Provide teaching via preferred learning methods  Outcome: Progressing     Problem: DISCHARGE PLANNING  Goal: Discharge to home or other facility with appropriate resources  Description: INTERVENTIONS:- Identify barriers to discharge w/patient and caregiver- Arrange for needed discharge resources and transportation as appropriate- Identify discharge learning needs (meds, wound care, etc.)- Arrange for  interpretive services to assist at discharge as needed- Refer to Case Management Department for coordinating discharge planning if the patient needs post-hospital services based on physician/advanced practitioner order or complex needs related to functional status, cognitive ability, or social support system  Outcome: Progressing

## 2025-04-09 NOTE — OCCUPATIONAL THERAPY NOTE
OT EVALUATION       03/25/25 1408   Note Type   Note type Cancelled Session   Cancel Reasons Patient off floor/hemodialysis   Additional Comments Patient currently on dialysis.  Will follow.   Licensure   [place] License Number  Lizeth Phillips MS OTR/L [id number]        Room 315: Focused wound assess along with primary nurse Da THAKUR    Upper mid back, stage 1 pressure injury, nonblanchable redness, POA.       Sacrum, deep tissue pressure injury, POA.  10x9.5x0cm, blistering, skin shifting noted which will turn into skin slippage.  Deep purple discoloration, no odor or drainage, periwound nonblanchable redness.       Left posterior knee, purple bruising, POA.   Open to air, no odor or drainage.       Right heel intact.       Left heel intact.       Left dorsal foot, trauma wounds, deep purple discoloration, POA.   Open to air, no odor or drainage.       Left pretibial, trauma wound from fall at home, POA.  Dried blood & crusty scabbing, no drainage or odor, open to air.   Skin care per unit protocol.  Ana Rosa DAMICON, RN, CWOCN, CFCN

## 2025-04-09 NOTE — ASSESSMENT & PLAN NOTE
- CT CAP 4/2: Fracture of L2 vertebral body extending to the superior endplate; seen on scan from 3/24 as well and was admitted to [facility]  - Has an LSO brace  - PT/OT; OOB and ambulation as able

## 2025-04-09 NOTE — PROGRESS NOTES
Progress Note - Nephrology   Name: Naresh Carpio 64 y.o. male I MRN: [id number]  Unit/Bed#: PPHP 618-01 I Date of Admission: 4/3/2025   Date of Service: 4/9/2025 I Hospital Day: 6     Assessment & Plan  ESRD (end stage renal disease) on dialysis ([facility])  TTS at [facility] IN [place]  Access: Right IJ permcath  EDW: 99 kg per previous hospital records  Continue with current dialysis schedule  Due for HD Thursday 4/10/25  Traumatic retroperitoneal hemorrhage  History of a fall onto a rail a few days ago. Continued to have progressive pain  Presented to [place] ED, found to have retroperitoneal hemorrhage with areas of active contrast extravasation. Transferred to \[facility]\""  Required 1u PRBC 4/7  Management per primary service  Primary hypertension  BP currently stable  Home regimen: Nifedipine 30 mg daily   Monitor blood pressures  Anemia due to chronic kidney disease, on chronic dialysis ([facility])  Start SURINDER therapy 5000 units q. HD  Current hemoglobin 7.3 - s/p 1u PRBC 4/7  At risk for electrolyte imbalance  Recent potassium 4.1, calcium 8.9   HD TTS  Chronic diastolic (congestive) heart failure ([facility])  Echo Oct. 2024 with LVEF 60-65%  Monitor volume status   UF with HD  Acute on chronic anemia  Home regimen: Renagel 800 mg TID per chart review  Most recent Phosphorus 4.3  HD TTS      Review of Systems  Patient seen and examined at bedside.  Patient out of bed in bedside chair.  Has no complaints or concerns, denies pain.  No shortness of breath or chest pain.  Due for HD treatment tomorrow.     Review of Systems   Constitutional:  Negative for fever.   Respiratory:  Negative for cough and shortness of breath.    Cardiovascular:  Positive for leg swelling. Negative for chest pain and palpitations.   Gastrointestinal:  Negative for abdominal pain, constipation, diarrhea, nausea and vomiting.   Skin:  Negative for color change.   Neurological:  Negative for dizziness, light-headedness and headaches.         Physical Exam:  Current Weight:  Weight - Scale: 100 kg (221 lb 1.9 oz)  Vitals:    04/08/25 1809 04/08/25 2210 04/08/25 2211 04/09/25 0719   BP: (!) 139/45 131/50  129/53   BP Location:    Left arm   Pulse: 78 67  64   Resp:  18  18   Temp:   98.5 °F (36.9 °C) 98.1 °F (36.7 °C)   TempSrc:   Oral Oral   SpO2: 92% 97%  94%   Weight:       Height:           Physical Exam  Vitals and nursing note reviewed.   Constitutional:       General: He is not in acute distress.     Appearance: He is ill-appearing.   Cardiovascular:      Rate and Rhythm: Normal rate.      Pulses: Normal pulses.      Heart sounds: No murmur heard.     No gallop.   Pulmonary:      Effort: Pulmonary effort is normal. No respiratory distress.      Breath sounds: Normal breath sounds. No wheezing.   Abdominal:      General: Bowel sounds are normal. There is no distension.      Palpations: Abdomen is soft.      Tenderness: There is no abdominal tenderness.   Musculoskeletal:      Right lower leg: Edema present.      Left lower leg: Edema present.   Skin:     General: Skin is warm and dry.      Capillary Refill: Capillary refill takes less than 2 seconds.   Neurological:      Mental Status: He is alert and oriented to person, place, and time. Mental status is at baseline.   Psychiatric:         Mood and Affect: Mood normal.         Behavior: Behavior normal.        Medications:    Current Facility-Administered Medications:     acetaminophen (TYLENOL) tablet 975 mg, 975 mg, Oral, Q8H KEMAL, Nallely Ayesha, PA-C, 975 mg at 04/09/25 0500    allopurinol (ZYLOPRIM) tablet 100 mg, 100 mg, Oral, Daily, Nallely Ayesha, PA-C, 100 mg at 04/09/25 0846    atorvastatin (LIPITOR) tablet 80 mg, 80 mg, Oral, Daily With Dinner, Nallely Ayesha, PA-C, 80 mg at 04/08/25 1809    chlorhexidine (PERIDEX) 0.12 % oral rinse 15 mL, 15 mL, Mouth/Throat, Q12H KEMAL, Nallely Ayesha, PA-C, 15 mL at 04/09/25 0847    epoetin jessica (EPOGEN,PROCRIT) injection 3,000 Units, 3,000 Units, Intravenous, After Dialysis,  3,000 Units at 04/08/25 1018 **AND** epoetin jessica (EPOGEN,PROCRIT) injection 2,000 Units, 2,000 Units, Intravenous, After Dialysis, Anthony Mixon DO, 2,000 Units at 04/08/25 1018    gabapentin (NEURONTIN) capsule 100 mg, 100 mg, Oral, Once per day on Monday Wednesday Friday, Nallely Hodge PA-C, 100 mg at 04/09/25 0847    heparin (porcine) subcutaneous injection 5,000 Units, 5,000 Units, Subcutaneous, Q8H KEMAL, KATHRYN GarciaC, 5,000 Units at 04/09/25 1125    HYDROmorphone (DILAUDID) injection 0.5 mg, 0.5 mg, Intravenous, Q6H PRN, Kadeem Corona PA-C, 0.5 mg at 04/08/25 2159    lidocaine (LIDODERM) 5 % patch 1 patch, 1 patch, Topical, Daily PRN, KATHRYN DoyleC, 1 patch at 04/05/25 1146    methocarbamol (ROBAXIN) tablet 750 mg, 750 mg, Oral, Q6H KEMAL, Daryl Segal MD, 750 mg at 04/09/25 1125    naloxone (NARCAN) 0.04 mg/mL syringe 0.04 mg, 0.04 mg, Intravenous, Q1MIN PRN, Nallely Hodge PA-C    NIFEdipine (PROCARDIA XL) 24 hr tablet 30 mg, 30 mg, Oral, After Dinner, GRANT Zuluaga, 30 mg at 04/08/25 1809    oxyCODONE (ROXICODONE) IR tablet 5 mg, 5 mg, Oral, Q3H PRN **OR** oxyCODONE (ROXICODONE) immediate release tablet 10 mg, 10 mg, Oral, Q3H PRN, Daryl Segal MD, 10 mg at 04/09/25 0846    senna-docusate sodium (SENOKOT S) 8.6-50 mg per tablet 1 tablet, 1 tablet, Oral, HS, Nallely Hodge PA-C, 1 tablet at 04/08/25 2159    sevelamer (RENAGEL) tablet 1,600 mg, 1,600 mg, Oral, TID With Meals, Nallely Hodge PA-C, 1,600 mg at 04/09/25 1125    Laboratory Results:  Results from last 7 days   Lab Units 04/09/25  0541 04/08/25  1414 04/08/25  0801 04/07/25  1602 04/07/25  0602 04/06/25  0910 04/06/25  0109 04/05/25  1410 04/05/25  0840 04/04/25  1857 04/04/25  0628 04/03/25  1524 04/03/25  0545 04/03/25  0148   WBC Thousand/uL 8.98  --  11.97*  --  11.37* 11.08*  --   --   --   --  8.27  --  8.74 9.65   HEMOGLOBIN g/dL 7.3* 7.0* 7.0* 7.0* 6.5* 7.2*  "7.2*   < > 7.2*   < > 6.5*   < > 7.5* 6.5*   HEMATOCRIT % 23.5* 22.7* 21.6* 21.4* 21.4* 22.5* 22.0*   < > 22.0*   < > 20.9*  --  23.7* 20.7*   PLATELETS Thousands/uL 327  --  292  --  312 282  --   --   --   --  267  --  264 267   SODIUM mmol/L 132*  --  129*  --  129* 131*  --   --  130*  --  133*  --  133* 132*   POTASSIUM mmol/L 4.1  --  5.6*  --  5.0 4.6  --   --  4.5  --  4.2  --  5.3 5.0   CHLORIDE mmol/L 94*  --  93*  --  93* 93*  --   --  91*  --  95*  --  94* 93*   CO2 mmol/L 26  --  23  --  25 28  --   --  28  --  27  --  27 22   BUN mg/dL 34*  --  62*  --  49* 39*  --   --  47*  --  33*  --  63* 58*   CREATININE mg/dL 5.53*  --  8.57*  --  7.10* 5.82*  --   --  6.42*  --  4.75*  --  7.00* 6.78*   CALCIUM mg/dL 8.9  --  8.9  --  8.9 9.1  --   --  9.0  --  8.9  --  8.5 8.2*   MAGNESIUM mg/dL 2.2  --   --   --   --   --   --   --   --   --  2.2  --  2.2 2.1   PHOSPHORUS mg/dL 4.3*  --   --   --   --   --   --   --   --   --  4.9*  --  8.3* 7.7*    < > = values in this interval not displayed.       Medical records have been reviewed through [facility] and care everywhere for this patient encounter    Portions of the record may have been created with voice recognition software. Occasional wrong word or \"sound a like\" substitutions may have occurred due to the inherent limitations of voice recognition software. Read the chart carefully and recognize,   "

## 2025-04-09 NOTE — ASSESSMENT & PLAN NOTE
TTS at [facility] IN [place]  Access: Right IJ permcath  EDW: 99 kg per previous hospital records  Continue with current dialysis schedule  Due for HD Thursday 4/10/25

## 2025-04-09 NOTE — ASSESSMENT & PLAN NOTE
Lab Results   Component Value Date    EGFR 9 04/09/2025    EGFR 5 04/08/2025    EGFR 7 04/07/2025    CREATININE 5.53 (H) 04/09/2025    CREATININE 8.57 (H) 04/08/2025    CREATININE 7.10 (H) 04/07/2025   - [facility] Nephrology consult and recommendations  - Continue TTS dilaysis via permacath   - Last Dialysis 4/8, due tomorrow  - Sevelamer 1,600mg TID

## 2025-04-09 NOTE — PLAN OF CARE
Problem: OCCUPATIONAL THERAPY ADULT  Goal: Performs self-care activities at highest level of function for planned discharge setting.  See evaluation for individualized goals.  Description: Treatment Interventions: ADL retraining, Functional transfer training, Endurance training, Patient/family training, Equipment evaluation/education, Compensatory technique education, Activityengagement          See flowsheet documentation for full assessment, interventions and recommendations.   Outcome: Progressing  Note: Limitation: Decreased ADL status, Decreased Safe judgement during ADL, Decreased cognition, Decreased endurance, Decreased self-care trans, Decreased high-level ADLs  Prognosis: Fair  Assessment: pt participated in am ot session and was seen focusing  on adls seated in chair, clothing management pulling pants over hips. pts balance is fair  with rw  his distance is limited activity tolerence is fair plus. pt requires min asst pant donning max asst sock shawna doff. pt with improvement from past session. feel at this time pt requires in pt rehab inorder to return home independently.  pt wishes at this time for hopeful return home from reported bad experience at past rehab. will follow focusing on goals from ie.  pt is s/p embolizations since i.e. otr/l aware.     Rehab Resource Intensity Level, OT: II (Moderate Resource Intensity)   April A Storm

## 2025-04-09 NOTE — CASE MANAGEMENT
Case Management Discharge Planning Note    Patient name Naresh Carpio  [facility] MRN [id number]  : 1960 Date 2025       Current Admission Date: 4/3/2025  Current Admission Diagnosis:Traumatic retroperitoneal hemorrhage   Patient Active Problem List    Diagnosis Date Noted Date Diagnosed    Pseudoaneurysm (HCC) 2025     Acute pain due to trauma 2025     ESRD on dialysis ([facility]) 2025     Wounds, multiple 2025     Traumatic retroperitoneal hemorrhage 2025     Secondary hyperparathyroidism ([facility]) 2025     At risk for electrolyte imbalance 2025     Multiple open wounds of foot 2025     Disorder of acid-base balance 2025     L2 vertebral fracture ([facility]) 2025     Non-compliance with treatment 2025     Generalized weakness 2025     Gout 2025     Closed fracture of proximal end of left humerus with routine healing 02/10/2025     Left shoulder pain 2025     ESRD (end stage renal disease) on dialysis ([facility]) 2025     Primary hypertension 2025     Anemia in ESRD (end-stage renal disease)  ([facility]) 2025     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, on chronic dialysis ([facility]) 2025     Renal lesion 2024     Chronic wound 2024     Chronic diastolic (congestive) heart failure ([facility]) 2024     Pleural effusion 2024     Elevated troponin 2024     Fall 2024     Melena 2024     Paroxysmal atrial fibrillation ([facility])      ESRD (end stage renal disease) ([facility]) 10/25/2024     Nausea 10/20/2024     Hyponatremia 10/19/2024     Acute on chronic anemia 10/14/2024     Falls 10/08/2024     PAD (peripheral artery disease) ([facility]) 10/08/2024     Closed fracture of right pelvis ([facility]) 2024     Bilateral sacral fracture, closed ([facility]) 2024     Hyperkalemia 2024     Difficulty with speech 2024     History of  amputation of hallux (HCC) 03/18/2024     Facial cellulitis 03/12/2024     Constipation 09/06/2023     Cellulitis of right foot      Polymicrobial bacterial infection 08/24/2023     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 mellitus, with necrosis of bone (HCC)      Acquired deformity of foot, right      Charcot's joint      Subacute osteomyelitis of right foot (HCC)      Open wound of right foot 08/21/2023     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 mellitus, with bone involvement without evidence of necrosis (HCC)      Diabetic ulcer of left midfoot associated with type 2 diabetes mellitus, limited to breakdown of skin (HCC)      Diabetic polyneuropathy associated with type 2 diabetes mellitus (HCC)      Diabetes mellitus type 2 with peripheral artery disease (HCC)      History of amputation of lesser toe of left foot ([facility])      Onychomycosis      Status post fall 08/19/2023     Traumatic rhabdomyolysis ([facility]) 08/19/2023     Osteoarthritis of left hip 07/27/2022     Severe Left Orchalgia 07/26/2022     Right shoulder pain 07/26/2022     Left hip pain 07/06/2022     Hemodialysis status ([facility])      Hypervolemia      Diarrhea 01/22/2022     Anemia due to chronic kidney disease, on chronic dialysis (HCC) 01/21/2022     History of prediabetes      Hypertension      History of TIA (transient ischemic attack)      T10 vertebral fracture ([facility])        LOS (days): 6  Geometric Mean LOS (GMLOS) (days): 6.9  Days to GMLOS:0.4     OBJECTIVE:  Risk of Unplanned Readmission Score: 69.58         Current admission status: Inpatient   Preferred Pharmacy:   [id number] [address] [place] [address]  [address] [address]  Phone: [phone] Fax: [phone]    Primary Care Provider: Jeffrey Jackson    Primary Insurance: MEDICARE  Secondary Insurance:     DISCHARGE DETAILS:    Pt accepted to [facility] for SNF rehab  CM spoke to  their liaison Kelsey [phone]  Pt is aware and in agreement with d/c there. Pt potentially can d/c Thursday or Friday

## 2025-04-09 NOTE — PLAN OF CARE
Problem: PHYSICAL THERAPY ADULT  Goal: Performs mobility at highest level of function for planned discharge setting.  See evaluation for individualized goals.  Description: Treatment/Interventions: Functional transfer training, LE strengthening/ROM, Elevations, Therapeutic exercise, Endurance training, Patient/family training, Equipment eval/education, Bed mobility, Gait training, Spoke to nursing, OT  Equipment Recommended: Walker       See flowsheet documentation for full assessment, interventions and recommendations.  Outcome: Progressing  Note: Prognosis: Good  Problem List: Decreased endurance, Impaired balance, Decreased mobility, Pain, Decreased safety awareness, Impaired judgement  Assessment: The patient is demonstrating improvement in his mobility today as he was able to attain household distances with increased time and use of the rolling walker. He was expressive about his situation, but after empathetic listening and explaining that his goals of returning home can be significantly improved with progressive mobility. He was limited due to pain, but he was able to transfer several times without assistance. Contact guard was provided with ambulation. Educated him about the importance of continued mobility, and expressed that he can utilize the callbell to ask to go for several walks a day ideally. Also discussed with him about receiving therapy prior to dialysis on those days. He was amenable to this idea. He remains limited from his baseline, and he will benefit from continued therapies in order to facilitate his recovery to independence. Emphasized that his recovery will be faster with ambulating with staff several times a day, and to reach out via the call bell to achieve this.        Rehab Resource Intensity Level, PT: II (Moderate Resource Intensity)    See flowsheet documentation for full assessment.

## 2025-04-09 NOTE — OCCUPATIONAL THERAPY NOTE
Occupational Therapy Treatment Note:      04/09/25 1050   OT Last Visit   OT Visit Date 04/09/25   Note Type   Note Type Treatment   Cancel Reasons Medical status   Pain Assessment   Pain Assessment Tool FLACC   Pain Rating: FLACC (Rest) - Face 0   Pain Rating: FLACC (Rest) - Legs 0   Pain Rating: FLACC (Rest) - Activity 0   Pain Rating: FLACC (Rest) - Cry 0   Pain Rating: FLACC (Rest) - Consolability 0   Score: FLACC (Rest) 0   Pain Rating: FLACC (Activity) - Face 0   Pain Rating: FLACC (Activity) - Legs 0   Pain Rating: FLACC (Activity) - Activity 0   Pain Rating: FLACC (Activity) - Cry 0   Pain Rating: FLACC (Activity) - Consolability 0   Score: FLACC (Activity) 0   Restrictions/Precautions   Braces or Orthoses LSO  (pt refusing lso for short distance mobility/ standing for pant over hips)   Other Precautions Chair Alarm;Fall Risk;Pain   ADL   Where Assessed Chair   UB Dressing Assistance 5  Supervision/Setup   LB Dressing Assistance 3  Moderate Assistance   LB Dressing Deficit   (min asst pant donning and max asst socks)   Toileting Assistance  4  Minimal Assistance   Functional Standing Tolerance   Time f balance with rw, some unsteadiness but not lob   Bed Mobility   Additional Comments pt sleeps in a chair at home and has been doing same here at [facility]   Transfers   Sit to Stand 5  Supervision   Stand to Sit 5  Supervision   Functional Mobility   Functional Mobility 4  Minimal assistance   Additional Comments rw   Cognition   Arousal/Participation Alert   Attention Attends with cues to redirect   Memory Decreased recall of precautions   Following Commands Follows multistep commands with increased time or repetition   Comments pt very talkative, cooperative this date c minimal encouragement. pt at this time wishes to go home not to in pt rehab. pt is at risk for falls.   Activity Tolerance   Activity Tolerance Patient tolerated treatment well   Assessment   Assessment pt participated in am ot session and was  seen focusing  on adls seated in chair, clothing management pulling pants over hips. pts balance is fair  with rw  his distance is limited activity tolerence is fair plus. pt requires min asst pant donning max asst sock shawna doff. pt with improvement from past session. feel at this time pt requires in pt rehab inorder to return home independently.  pt wishes at this time for hopeful return home from reported bad experience at past rehab. will follow focusing on goals from ie.  pt is s/p embolizations since i.e. otr/l aware.   Plan   Treatment Interventions ADL retraining;Functional transfer training;Endurance training;Patient/family training;Equipment evaluation/education;Activityengagement   Goal Expiration Date 04/18/25   OT Treatment Day 1   OT Frequency 2-3x/wk   Discharge Recommendation   Rehab Resource Intensity Level, OT II (Moderate Resource Intensity)   AM-PAC Daily Activity Inpatient   Lower Body Dressing 2   Bathing 3   Toileting 3   Upper Body Dressing 3   Grooming 3   Eating 3   Daily Activity Raw Score 17   Daily Activity Standardized Score (Calc for Raw Score >=11) 37.26   AM-PAC Applied Cognition Inpatient   Following a Speech/Presentation 3   Understanding Ordinary Conversation 4   Taking Medications 3   Remembering Where Things Are Placed or Put Away 3   Remembering List of 4-5 Errands 3   Taking Care of Complicated Tasks 3   Applied Cognition Raw Score 19   Applied Cognition Standardized Score 39.77     April A Storm

## 2025-04-09 NOTE — ASSESSMENT & PLAN NOTE
- Rate controlled  - Home Eliquis 5mg BID and aspirin 81mg currently held  - S/p Kcentra on presentation 4/2  - 4/9 start SQH for DVT ppx and check 4/10 AM Labs, If hgb remains stable recommend restarting home Eliquis

## 2025-04-09 NOTE — ASSESSMENT & PLAN NOTE
Lab Results   Component Value Date    HGBA1C 5.7 (H) 02/09/2025       Recent Labs     04/07/25  2105 04/08/25  1601 04/08/25  1813 04/09/25  0810   POCGLU 108 101 102 99       Blood Sugar Average: Last 72 hrs:  (P) [id number]  - Last A1c 5.7, pre-diabetic.   - Not on home medications  - Not currently requiring SSI. Continue to monitor

## 2025-04-09 NOTE — ASSESSMENT & PLAN NOTE
Lab Results   Component Value Date    EGFR 9 04/09/2025    EGFR 5 04/08/2025    EGFR 7 04/07/2025    CREATININE 5.53 (H) 04/09/2025    CREATININE 8.57 (H) 04/08/2025    CREATININE 7.10 (H) 04/07/2025   Will attempt to minimize nephrotoxic pain medications.

## 2025-04-09 NOTE — PLAN OF CARE
Problem: PAIN - ADULT  Goal: Verbalizes/displays adequate comfort level or baseline comfort level  Description: Interventions:- Encourage patient to monitor pain and request assistance- Assess pain using appropriate pain scale- Administer analgesics based on type and severity of pain and evaluate response- Implement non-pharmacological measures as appropriate and evaluate response- Consider cultural and social influences on pain and pain management- Notify physician/advanced practitioner if interventions unsuccessful or patient reports new pain  Outcome: Progressing     Problem: INFECTION - ADULT  Goal: Absence or prevention of progression during hospitalization  Description: INTERVENTIONS:- Assess and monitor for signs and symptoms of infection- Monitor lab/diagnostic results- Monitor all insertion sites, i.e. indwelling lines, tubes, and drains- Monitor endotracheal if appropriate and nasal secretions for changes in amount and color- [place] appropriate cooling/warming therapies per order- Administer medications as ordered- Instruct and encourage patient and family to use good hand hygiene technique- Identify and instruct in appropriate isolation precautions for identified infection/condition  Outcome: Progressing     Problem: SAFETY ADULT  Goal: Patient will remain free of falls  Description: INTERVENTIONS:- Educate patient/family on patient safety including physical limitations- Instruct patient to call for assistance with activity - Consult OT/PT to assist with strengthening/mobility - Keep Call bell within reach- Keep bed low and locked with side rails adjusted as appropriate- Keep care items and personal belongings within reach- Initiate and maintain comfort rounds- Make Fall Risk Sign visible to staff- Offer Toileting every 2 Hours, in advance of need- Initiate/Maintain bed/chair alarm- Obtain necessary fall risk management equipment: - Apply yellow socks and bracelet for high fall risk patients-  Consider moving patient to room near nurses station  Outcome: Progressing  Goal: Maintain or return to baseline ADL function  Description: INTERVENTIONS:-  Assess patient's ability to carry out ADLs; assess patient's baseline for ADL function and identify physical deficits which impact ability to perform ADLs (bathing, care of mouth/teeth, toileting, grooming, dressing, etc.)- Assess/evaluate cause of self-care deficits - Assess range of motion- Assess patient's mobility; develop plan if impaired- Assess patient's need for assistive devices and provide as appropriate- Encourage maximum independence but intervene and supervise when necessary- Involve family in performance of ADLs- Assess for home care needs following discharge - Consider OT consult to assist with ADL evaluation and planning for discharge- Provide patient education as appropriate  Outcome: Progressing  Goal: Maintains/Returns to pre admission functional level  Description: INTERVENTIONS:- Perform AM-PAC 6 Click Basic Mobility/ Daily Activity assessment daily.- Set and communicate daily mobility goal to care team and patient/family/caregiver. - Collaborate with rehabilitation services on mobility goals if consulted- Reposition patient every 2 hours.- Out of bed for meals 3 times a day- Out of bed for toileting- Record patient progress and toleration of activity level   Outcome: Progressing     Problem: DISCHARGE PLANNING  Goal: Discharge to home or other facility with appropriate resources  Description: INTERVENTIONS:- Identify barriers to discharge w/patient and caregiver- Arrange for needed discharge resources and transportation as appropriate- Identify discharge learning needs (meds, wound care, etc.)- Arrange for interpretive services to assist at discharge as needed- Refer to Case Management Department for coordinating discharge planning if the patient needs post-hospital services based on physician/advanced practitioner order or complex needs  related to functional status, cognitive ability, or social support system  Outcome: Progressing     Problem: Knowledge Deficit  Goal: Patient/family/caregiver demonstrates understanding of disease process, treatment plan, medications, and discharge instructions  Description: Complete learning assessment and assess knowledge base.Interventions:- Provide teaching at level of understanding- Provide teaching via preferred learning methods  Outcome: Progressing     Problem: Prexisting or High Potential for Compromised Skin Integrity  Goal: Skin integrity is maintained or improved  Description: INTERVENTIONS:- Identify patients at risk for skin breakdown- Assess and monitor skin integrity- Assess and monitor nutrition and hydration status- Monitor labs - Assess for incontinence - Turn and reposition patient- Assist with mobility/ambulation- Relieve pressure over bony prominences- Avoid friction and shearing- Provide appropriate hygiene as needed including keeping skin clean and dry- Evaluate need for skin moisturizer/barrier cream- Collaborate with interdisciplinary team - Patient/family teaching- Consider wound care consult   Outcome: Progressing     Problem: METABOLIC, FLUID AND ELECTROLYTES - ADULT  Goal: Electrolytes maintained within normal limits  Description: INTERVENTIONS:- Monitor labs and assess patient for signs and symptoms of electrolyte imbalances- Administer electrolyte replacement as ordered- Monitor response to electrolyte replacements, including repeat lab results as appropriate- Instruct patient on fluid and nutrition as appropriate  Outcome: Progressing  Goal: Fluid balance maintained  Description: INTERVENTIONS:- Monitor labs - Monitor I/O and WT- Instruct patient on fluid and nutrition as appropriate- Assess for signs & symptoms of volume excess or deficit  Outcome: Progressing

## 2025-04-09 NOTE — ASSESSMENT & PLAN NOTE
Lab Results   Component Value Date    EGFR 9 04/09/2025    EGFR 5 04/08/2025    EGFR 7 04/07/2025    CREATININE 5.53 (H) 04/09/2025    CREATININE 8.57 (H) 04/08/2025    CREATININE 7.10 (H) 04/07/2025     - Chronic anemia due to ESRD, baseline Hgb is 8-10  - Patient currently has acute blood loss anemia due to retroperitoneal hematoma  - s/p 4 units of pRBC during this hospitalization  - Goal is Hgb > 7  - Continue to monitor Hgb and transfuse for Hgb < 7 or symptomatic anemia  - 4/9 start SQH for DVT ppx and check 4/10 AM Labs, If hgb remains stable recommend restarting home Eliquis

## 2025-04-09 NOTE — ASSESSMENT & PLAN NOTE
History of a fall onto a rail a few days ago. Continued to have progressive pain  Presented to [place] ED, found to have retroperitoneal hemorrhage with areas of active contrast extravasation. Transferred to [facility]  Required 1u PRBC 4/7  Management per primary service

## 2025-04-09 NOTE — ASSESSMENT & PLAN NOTE
- CTA CAP 4/2: right retroperitoneal hematoma with areas of active contrast extravasation   - Appreciate IR consult and recommendations  - 4/3 IR embolization of branch of the R deep circumflex iliac artery and R superior gluteal artery   - 4/4 1 u pRBC  - 4/5 2 u pRBC  - Repeat CTA 4/5 indicated residual 5 mm pseudoaneurysm within the left gluteal musculature.   - 4/7 IR embolization of pseudoaneurysm  - 4/7 1 u pRBC  - Continue to monitor Hgb  - Multimodal pain control  - 4/9 initiate SQH for DVT ppx  - PT and OT as indicated  - Follow up in trauma clinic

## 2025-04-09 NOTE — PROGRESS NOTES
Progress Note - Acute Pain   Name: Naresh Carpio 64 y.o. male I MRN: [id number]  [facility] I Date of Admission: 4/3/2025   Date of Service: 4/9/2025 I Hospital Day: 6    Assessment & Plan  ESRD (end stage renal disease) on dialysis (HCC)  Lab Results   Component Value Date    EGFR 9 04/09/2025    EGFR 5 04/08/2025    EGFR 7 04/07/2025    CREATININE 5.53 (H) 04/09/2025    CREATININE 8.57 (H) 04/08/2025    CREATININE 7.10 (H) 04/07/2025   Will attempt to minimize nephrotoxic pain medications.  Acute pain due to trauma  Large retroperitoneal hematoma s/p IR embolization on 4/3  L2 vertebral fracture      Plan:  Acetaminophen 975 mg p.o. every 8 hours scheduled.  Gabapentin 100 mg p.o. 3 times weekly.  Methocarbamol 750 mg p.o. every 6 hours scheduled.  Lidocaine patch, on for 12 hours and off for 12 hours as needed for mild local pain.  Oxycodone 5 mg p.o. every 3 hours as needed moderate pain or 10 mg p.o. every 3 hours as needed severe pain.  Discontinue IV hydromorphone.  Bowel regimen to avoid opioid-induced constipation while on opioid pain medication.  As needed Narcan in the event that opioid reversal becomes necessary.  Ice to painful area for up to 20 minutes every hour as needed.    At discharge, suggest the following:  Acetaminophen 975 mg p.o. every 8 hours as needed mild pain.  Gabapentin 100 mg p.o. 3 times weekly.  Methocarbamol 750 mg p.o. every 6 hours as needed muscle spasm x 5 days.  Lidocaine patch, on for 12 hours and off for 12 hours as needed for mild local pain.  Oxycodone 10 mg p.o. every 4 hours as needed severe pain x 3 days, then oxycodone 5 mg p.o. every 4 hours as needed severe pain x 3 days, then discontinue.  Intranasal Narcan in the event that opioid reversal becomes necessary.  Bowel regimen while on opioid pain medication.          APS will sign off at this time. Thank you for the consult. All opioids and other analgesics to be written at discretion of primary team.  Please contact Acute Pain Service - via SecureChat from [phone] with additional questions or concerns. See SecureChat or Reggie for additional contacts and after hours information.    Subjective   Naresh Carpio is a 64 y.o. male with PMHx of AoCD, ESRD on HD, A-fib, HFpEF who presented with a large retroperitoneal hematoma c/b L2 vertebral fracture after mechanical fall. He is s/p IR embolization of deep circumflex iliac artery/superior gluteal artery on 4/3.  Return to IR on 4/7/2025 for embolization of pseudoaneurysms     Pain History  Current pain location(s):  Pain Score: 8  Pain Location/Orientation: Orientation: Lower, Location: Back  Pain Scale: Pain Assessment Tool: FLACC  24 hour history: Patient's pain continues to improve.  Was able to get up and work with physical therapy today and walk out of the room into the hallway.  Used only 1 dose of IV opioid in the past 24 hours.    Opioid requirement previous 24 hours: Oxycodone 10 mg p.o. x 4, hydromorphone 0.5 mg IV x 1.  Meds/Allergies   all current active meds have been reviewed, current meds:   Current Facility-Administered Medications:     acetaminophen (TYLENOL) tablet 975 mg, Q8H KEMAL    allopurinol (ZYLOPRIM) tablet 100 mg, Daily    atorvastatin (LIPITOR) tablet 80 mg, Daily With Dinner    chlorhexidine (PERIDEX) 0.12 % oral rinse 15 mL, Q12H KEMAL    epoetin jessica (EPOGEN,PROCRIT) injection 3,000 Units, After Dialysis **AND** epoetin jessica (EPOGEN,PROCRIT) injection 2,000 Units, After Dialysis    gabapentin (NEURONTIN) capsule 100 mg, Once per day on Monday Wednesday Friday    heparin (porcine) subcutaneous injection 5,000 Units, Q8H KEMAL    HYDROmorphone (DILAUDID) injection 0.5 mg, Q6H PRN    lidocaine (LIDODERM) 5 % patch 1 patch, Daily PRN    methocarbamol (ROBAXIN) tablet 750 mg, Q6H KEMAL    naloxone (NARCAN) 0.04 mg/mL syringe 0.04 mg, Q1MIN PRN    NIFEdipine (PROCARDIA XL) 24 hr tablet 30 mg, After Dinner    oxyCODONE (ROXICODONE) IR tablet 5 mg,  Q3H PRN **OR** oxyCODONE (ROXICODONE) immediate release tablet 10 mg, Q3H PRN    senna-docusate sodium (SENOKOT S) 8.6-50 mg per tablet 1 tablet, HS    sevelamer (RENAGEL) tablet 1,600 mg, TID With Meals, and PTA meds:   Prior to Admission Medications   Prescriptions Last Dose Informant Patient Reported? Taking?   NIFEdipine (PROCARDIA XL) 30 mg 24 hr tablet Past Week  No Yes   Sig: Take 1 tablet (30 mg total) by mouth daily after dinner   acetaminophen (TYLENOL) 325 mg tablet Past Week  No Yes   Sig: Take 3 tablets (975 mg total) by mouth every 8 (eight) hours   allopurinol (ZYLOPRIM) 100 mg tablet Past Week Self No Yes   Sig: Take 1 tablet (100 mg total) by mouth daily   apixaban (Eliquis) 5 mg Past Week  No Yes   Sig: Take 1 tablet (5 mg total) by mouth 2 (two) times a day   aspirin 81 mg chewable tablet Past Week  No Yes   Sig: Chew 1 tablet (81 mg total) daily   atorvastatin (LIPITOR) 80 mg tablet Past Week  Yes Yes   Sig: Take 80 mg by mouth daily   gabapentin (NEURONTIN) 100 mg capsule Past Week  No Yes   Sig: Take 1 capsule (100 mg total) by mouth 3 (three) times a week   lidocaine (LIDODERM) 5 % Past Week  No Yes   Sig: Apply 1 patch topically over 12 hours daily Remove & Discard patch within 12 hours or as directed by MD   methocarbamol (ROBAXIN) 500 mg tablet Past Week  No Yes   Sig: Take 1 tablet (500 mg total) by mouth every 6 (six) hours as needed for muscle spasms   sevelamer (RENAGEL) 800 mg tablet Past Week Self No Yes   Sig: Take 1 tablet (800 mg total) by mouth 3 (three) times a day with meals      Facility-Administered Medications: None     No Known Allergies  Objective :  Temp:  [97.6 °F (36.4 °C)-98.5 °F (36.9 °C)] 98.1 °F (36.7 °C)  HR:  [64-86] 64  BP: (127-139)/(41-67) 129/53  Resp:  [13-18] 18  SpO2:  [92 %-97 %] 94 %  O2 Device: None (Room air)    Physical Exam  Vitals and nursing note reviewed.   Constitutional:       General: He is awake. He is not in acute distress.     Appearance: He  is not ill-appearing, toxic-appearing or diaphoretic.      Comments: Sitting up in chair.  Appears mildly uncomfortable.   Skin:     General: Skin is warm and dry.   Neurological:      Mental Status: He is alert and oriented to person, place, and time.      GCS: GCS eye subscore is 4. GCS verbal subscore is 5. GCS motor subscore is 6.   Psychiatric:         Attention and Perception: Attention normal.         Speech: Speech normal.         Behavior: Behavior normal. Behavior is cooperative.            Lab Results: I have reviewed the following results:  Estimated Creatinine Clearance: 16.6 mL/min (A) (by C-G formula based on SCr of 5.53 mg/dL (H)).  Lab Results   Component Value Date    WBC 8.98 04/09/2025    HGB 7.3 (L) 04/09/2025    HCT 23.5 (L) 04/09/2025     04/09/2025         Component Value Date/Time    K 4.1 04/09/2025 0541    CL 94 (L) 04/09/2025 0541    CO2 26 04/09/2025 0541    BUN 34 (H) 04/09/2025 0541    CREATININE 5.53 (H) 04/09/2025 0541         Component Value Date/Time    CALCIUM 8.9 04/09/2025 0541    ALKPHOS 59 04/03/2025 0545    AST 14 04/03/2025 0545    ALT 7 04/03/2025 0545    TP 6.7 04/03/2025 0545    ALB 3.5 04/03/2025 0545       Imaging Results Review: No pertinent imaging studies reviewed.  Other Study Results Review: No additional pertinent studies reviewed.     Administrative Statements   I have spent a total time of 30 minutes in caring for this patient on the day of the visit/encounter including Risks and benefits of tx options, Instructions for management, Patient and family education, Importance of tx compliance, Risk factor reductions, Impressions, Counseling / Coordination of care, Documenting in the medical record, Reviewing/placing orders in the medical record (including tests, medications, and/or procedures), Obtaining or reviewing history  , and Communicating with other healthcare professionals .

## 2025-04-09 NOTE — PROGRESS NOTES
Progress Note - Trauma   Name: Naresh Carpio 64 y.o. male I MRN: [id number]  Unit/Bed#: [facility] 618-01 I Date of Admission: 4/3/2025   Date of Service: 4/9/2025 I Hospital Day: 6    Assessment & Plan  Traumatic retroperitoneal hemorrhage  - CTA CAP 4/2: right retroperitoneal hematoma with areas of active contrast extravasation   - Appreciate IR consult and recommendations  - 4/3 IR embolization of branch of the R deep circumflex iliac artery and R superior gluteal artery   - 4/4 1 u pRBC  - 4/5 2 u pRBC  - Repeat CTA 4/5 indicated residual 5 mm pseudoaneurysm within the left gluteal musculature.   - 4/7 IR embolization of pseudoaneurysm  - 4/7 1 u pRBC  - Continue to monitor Hgb  - Multimodal pain control  - 4/9 initiate SQH for DVT ppx  - PT and OT as indicated  - Follow up in trauma clinic  Acute on chronic anemia  Lab Results   Component Value Date    EGFR 9 04/09/2025    EGFR 5 04/08/2025    EGFR 7 04/07/2025    CREATININE 5.53 (H) 04/09/2025    CREATININE 8.57 (H) 04/08/2025    CREATININE 7.10 (H) 04/07/2025     - Chronic anemia due to ESRD, baseline Hgb is 8-10  - Patient currently has acute blood loss anemia due to retroperitoneal hematoma  - s/p 4 units of pRBC during this hospitalization  - Goal is Hgb > 7  - Continue to monitor Hgb and transfuse for Hgb < 7 or symptomatic anemia  - 4/9 start SQH for DVT ppx and check 4/10 AM Labs, If hgb remains stable recommend restarting home Eliquis  Wounds, multiple  - Bilateral foot wounds; R anterior shin wound  - Wound care consult and recommendations appreciated  L2 vertebral fracture (HCC)  - CT CAP 4/2: Fracture of L2 vertebral body extending to the superior endplate; seen on scan from 3/24 as well and was admitted to [facility]  - Has an LSO brace  - PT/OT; OOB and ambulation as able   Paroxysmal atrial fibrillation (HCC)  - Rate controlled  - Home Eliquis 5mg BID and aspirin 81mg currently held  - S/p Kcentra on presentation 4/2  - 4/9 start SQH for DVT ppx and  check 4/10 AM Labs, If hgb remains stable recommend restarting home Eliquis  Chronic diastolic (congestive) heart failure (HCC)  Wt Readings from Last 3 Encounters:   04/08/25 100 kg (221 lb 1.9 oz)   03/24/25 107 kg (234 lb 12.6 oz)   02/27/25 102 kg (224 lb 13.9 oz)     - Continue procardia xl 30mg   - No acute exacerbation  ESRD (end stage renal disease) on dialysis ([facility])  Lab Results   Component Value Date    EGFR 9 04/09/2025    EGFR 5 04/08/2025    EGFR 7 04/07/2025    CREATININE 5.53 (H) 04/09/2025    CREATININE 8.57 (H) 04/08/2025    CREATININE 7.10 (H) 04/07/2025   - Appreciate Nephrology consult and recommendations  - Continue TTS dilaysis via permacath   - Last Dialysis 4/8, due tomorrow  - Sevelamer 1,600mg TID   Primary hypertension  - Continue home procardia     History of prediabetes  Lab Results   Component Value Date    HGBA1C 5.7 (H) 02/09/2025       Recent Labs     04/07/25  2105 04/08/25  1601 04/08/25  1813 04/09/25  0810   POCGLU 108 101 102 99       Blood Sugar Average: Last 72 hrs:  (P) [id number]  - Last A1c 5.7, pre-diabetic.   - Not on home medications  - Not currently requiring SSI. Continue to monitor   Acute pain due to trauma  - Acute pain secondary to traumatic injuries.  - Continue multimodal analgesic regimen.  - Appreciate APS evaluation and recommendations.  - Bowel regimen as long as using opioids.  - Continue to monitor pain and adjust regimen as indicated.    Pseudoaneurysm (HCC)  - Pelvic angiogram demonstrated pseudoaneurysms present at distal branches of the left inferior gluteal artery   - s/p artery embolization with coils and gelfoam with IR on 4/7  - Monitor hgb    Bowel Regimen: senokot S  VTE Prophylaxis:VTE covered by:  heparin (porcine), Subcutaneous         Disposition: start SQH for DVT ppx and check 4/10 AM Labs, If hgb remains stable recommend restarting home Eliquis. Anticipate DC home when medically cleared.   Patient is refusing rehab placement.  "  Anticipate HD tomorrow  Please contact the SecureChat role,\"BE trauma AP\", with any questions/concerns.    24 Hour Events : Stable Hgb  Subjective : Patient reports he continues to have right sided flank pain and it extends to his abdomen. He reports that he is tolerating a diet, passing flatus but has not had a BM yet.    Objective :  Temp:  [97.6 °F (36.4 °C)-98.5 °F (36.9 °C)] 98.1 °F (36.7 °C)  HR:  [64-86] 64  BP: (127-139)/(41-67) 129/53  Resp:  [13-18] 18  SpO2:  [92 %-97 %] 94 %  O2 Device: None (Room air)    I/O         04/07 0701 04/08 0700 04/08 0701 04/09 0700 04/09 0701  04/10 0700    P.O. 480 290     I.V. (mL/kg) 200 (2) 500 (5)     Blood 350      Total Intake(mL/kg) 1030 (10.3) 790 (7.9)     Urine (mL/kg/hr)  125 (0.1)     Other  2500     Stool       Total Output  2625     Net +1030 -1835                  Lines/Drains/Airways       Active Status       Name Placement date Placement time Site Days    HD Permanent Double Catheter 08/30/23  --  Internal jugular  588                  Physical Exam   GENERAL APPEARANCE: Patient in no acute distress.  HEENT: NCAT; PERRL, EOMs intact; Mucous membranes moist    CV: Regular rate and rhythm; no murmur/gallops/rubs appreciated.  CHEST / LUNGS: Clear to auscultation; no wheezes/rales/rhonci.  ABD: NABS; soft; non-distended; non-tender. R flank and RLQ tenderness  : Voiding  EXT: +2 pulses bilaterally upper & lower extremities; no edema.  NEURO: GCS 15; no focal neurologic deficits; neurovascularly intact.  SKIN: Warm, dry and well perfused; no rash; no jaundice.           Lab Results: I have reviewed the following results:  Recent Labs     04/07/25  1126 04/07/25  1602 04/09/25  0541   WBC  --    < > 8.98   HGB  --    < > 7.3*   HCT  --    < > 23.5*   PLT  --    < > 327   SODIUM  --    < > 132*   K  --    < > 4.1   CL  --    < > 94*   CO2  --    < > 26   BUN  --    < > 34*   CREATININE  --    < > 5.53*   GLUC  --    < > 98   MG  --   --  2.2   PHOS  --   --  " 4.3*   INR 1.20*  --   --     < > = values in this interval not displayed.       Imaging Results Review: No pertinent imaging studies reviewed.  Other Study Results Review: No additional pertinent studies reviewed.   Adbry Pregnancy And Lactation Text: It is unknown if this medication will adversely affect pregnancy or breast feeding.

## 2025-04-09 NOTE — ASSESSMENT & PLAN NOTE
Large retroperitoneal hematoma s/p IR embolization on 4/3  L2 vertebral fracture      Plan:  Acetaminophen 975 mg p.o. every 8 hours scheduled.  Gabapentin 100 mg p.o. 3 times weekly.  Methocarbamol 750 mg p.o. every 6 hours scheduled.  Lidocaine patch, on for 12 hours and off for 12 hours as needed for mild local pain.  Oxycodone 5 mg p.o. every 3 hours as needed moderate pain or 10 mg p.o. every 3 hours as needed severe pain.  Discontinue IV hydromorphone.  Bowel regimen to avoid opioid-induced constipation while on opioid pain medication.  As needed Narcan in the event that opioid reversal becomes necessary.  Ice to painful area for up to 20 minutes every hour as needed.    At discharge, suggest the following:  Acetaminophen 975 mg p.o. every 8 hours as needed mild pain.  Gabapentin 100 mg p.o. 3 times weekly.  Methocarbamol 750 mg p.o. every 6 hours as needed muscle spasm x 5 days.  Lidocaine patch, on for 12 hours and off for 12 hours as needed for mild local pain.  Oxycodone 10 mg p.o. every 4 hours as needed severe pain x 3 days, then oxycodone 5 mg p.o. every 4 hours as needed severe pain x 3 days, then discontinue.  Intranasal Narcan in the event that opioid reversal becomes necessary.  Bowel regimen while on opioid pain medication.

## 2025-04-09 NOTE — ASSESSMENT & PLAN NOTE
- Bilateral foot wounds; R anterior shin wound  - Wound care consult and recommendations appreciated

## 2025-04-09 NOTE — WOUND OSTOMY CARE
Progress Note - Wound   Naresh Carpio 64 y.o. male MRN: [id number]  Unit/Bed#: [facility] 618-01 Encounter: [id number]        Assessment:   Patient is seen for wound care follow-up.   Right Medial Foot: pink wound bed with white scaring periwound  Left Plantar Foot Wound: dry intact brown red no drainage  Right Anterior Pretibial Leg:  intact, dry well adhered scab.  Left Foot Great Toe and 2nd Toe Eschars:  2 areas of intact black brown eschar measured together dry intact   Sacrum white deep red intact . Foam applied as preventative . Pt has an EHOB cushion and foam wedges to reposition off of sacrum    6.   B/L heels are dry intact and howie with no skin loss or wounds present. Recommend preventative        Hydraguard Cream and proper offloading/ repositioning.        Skin Care Plan:  1-Left Medial Foot and Left Plantar Foot Wounds: cleanse with NSS and pat dry. Apply dermagran to plantar wound only. Cover both wounds with silicone bordered foam dressings. Naren with T for Treatment and change every other day or PRN  2-Turn/reposition q2h or when medically stable for pressure re-distribution on skin.  3-Elevate heels to offload pressure.  4-Moisturize skin daily with skin nourishing cream  5-Ehob cushion in chair when out of bed.  6-Preventative Hydraguard to heels BID and PRN.   7-Paint Left Foot Great Toe and 2nd Toe Eschars with Betadine Daily.   8-Cleanse B/L Sacro-Buttocks with soap and water. Pat dry. Apply Silicone Border Foam (Mepilex) to area. Naren with P for Prevention and change every 3 days or PRN soilage/displacement. Peel back and inspect Q-shift.  No induration, fluctuance, odor, warmth/temperature differences, redness, or purulence noted to the above noted wounds and skin areas assessed. New dressings applied per orders listed below. Patient tolerated well- no s/s of non-verbal pain or discomfort observed during the encounter. Bedside nurse aware of plan of care. See flow sheets for more detailed  assessment findings.      Orders listed below and wound care will continue to follow, call or Secure Chat with questions.     WOUNDS:  Wound 02/18/25 Diabetic Ulcer Toe D1, great Left;Medial (Active)   Wound Image   04/09/25 1112   Wound Description Clean;Dry;Intact;Brown 04/09/25 1112   Wound Length (cm) 1 cm 04/09/25 1112   Wound Width (cm) 1.2 cm 04/09/25 1112   Wound Surface Area (cm^2) 1.2 cm^2 04/09/25 1112   Drainage Amount None 04/09/25 1112   Treatments Site care;Cleansed 04/09/25 1112   Dressing Open to air 04/09/25 1112       Wound 03/24/25 Diabetic Ulcer Foot Left;Medial (Active)   Wound Image   04/09/25 1111   Wound Description Beefy red;Drainage 04/09/25 1111   Non-staged Wound Description Partial thickness 04/09/25 1111   Wound Length (cm) 1 cm 04/09/25 1111   Wound Width (cm) 1 cm 04/09/25 1111   Wound Depth (cm) 0.1 cm 04/09/25 1111   Wound Surface Area (cm^2) 1 cm^2 04/09/25 1111   Wound Volume (cm^3) 0.1 cm^3 04/09/25 1111   Calculated Wound Volume (cm^3) 0.1 cm^3 04/09/25 1111   Change in Wound Size % 33.33 04/09/25 1111   Drainage Amount Scant 04/09/25 1111   Drainage Description Clear 04/09/25 1111   Liliana-wound Assessment Dry;Pink;White 04/09/25 1111   Treatments Cleansed;Site care 04/09/25 1111   Dressing Dermagran gauze;Foam 04/09/25 1111   Dressing Changed Changed 04/09/25 1111   Patient Tolerance Tolerated well 04/09/25 1111   Dressing Status Clean;Dry;Intact 04/09/25 1111       Wound 03/26/25 Diabetic Ulcer Foot Left;Plantar (Active)   Wound Image   04/09/25 1112   Wound Description Beefy red;Dry;Intact;Brown 04/09/25 1112   Wound Length (cm) 1.5 cm 04/09/25 1112   Wound Width (cm) 1 cm 04/09/25 1112   Wound Surface Area (cm^2) 1.5 cm^2 04/09/25 1112   Drainage Amount None 04/09/25 1112   Treatments Site care;Cleansed 04/09/25 1112   Dressing Open to air 04/09/25 1112   Dressing Status Clean;Dry;Intact 04/09/25 0845       Wound 04/09/25 Pretibial Right (Active)   Wound Image   04/09/25  1113       Wound 04/09/25 Coccyx (Active)   Wound Image   04/09/25 1119   Wound Description Beefy red;White 04/09/25 1119   Wound Length (cm) 1 cm 04/09/25 1119   Wound Width (cm) 1 cm 04/09/25 1119   Wound Surface Area (cm^2) 1 cm^2 04/09/25 1119   Drainage Amount None 04/09/25 1119   Treatments Cleansed;Site care 04/09/25 1119   Dressing Foam, Silicon (eg. Allevyn, etc) 04/09/25 1119   Dressing Changed New 04/09/25 1119   Patient Tolerance Tolerated well 04/09/25 1119   Dressing Status Clean;Dry;Intact 04/09/25 1119      Call or Secure Chat  with any questions  Wound Care will continue to follow weekly  Anat DANIELSONN RN CWON

## 2025-04-10 ENCOUNTER — APPOINTMENT (INPATIENT)
Dept: DIALYSIS | Facility: HOSPITAL | Age: 65
DRG: 907 | End: 2025-04-10
Payer: MEDICARE

## 2025-04-10 LAB
ANION GAP SERPL CALCULATED.3IONS-SCNC: 11 MMOL/L (ref 4–13)
BASOPHILS # BLD AUTO: 0.05 THOUSANDS/ÂΜL (ref 0–0.1)
BASOPHILS NFR BLD AUTO: 1 % (ref 0–1)
BUN SERPL-MCNC: 52 MG/DL (ref 5–25)
CALCIUM SERPL-MCNC: 9 MG/DL (ref 8.4–10.2)
CHLORIDE SERPL-SCNC: 93 MMOL/L (ref 96–108)
CO2 SERPL-SCNC: 28 MMOL/L (ref 21–32)
CREAT SERPL-MCNC: 7.5 MG/DL (ref 0.6–1.3)
EOSINOPHIL # BLD AUTO: 0.4 THOUSAND/ÂΜL (ref 0–0.61)
EOSINOPHIL NFR BLD AUTO: 5 % (ref 0–6)
ERYTHROCYTE [DISTWIDTH] IN BLOOD BY AUTOMATED COUNT: 16.4 % (ref 11.6–15.1)
GFR SERPL CREATININE-BSD FRML MDRD: 6 ML/MIN/1.73SQ M
GLUCOSE SERPL-MCNC: 102 MG/DL (ref 65–140)
GLUCOSE SERPL-MCNC: 102 MG/DL (ref 65–140)
GLUCOSE SERPL-MCNC: 115 MG/DL (ref 65–140)
HCT VFR BLD AUTO: 23.7 % (ref 36.5–49.3)
HGB BLD-MCNC: 7.4 G/DL (ref 12–17)
IMM GRANULOCYTES # BLD AUTO: 0.12 THOUSAND/UL (ref 0–0.2)
IMM GRANULOCYTES NFR BLD AUTO: 2 % (ref 0–2)
LYMPHOCYTES # BLD AUTO: 1.18 THOUSANDS/ÂΜL (ref 0.6–4.47)
LYMPHOCYTES NFR BLD AUTO: 15 % (ref 14–44)
MAGNESIUM SERPL-MCNC: 2.2 MG/DL (ref 1.9–2.7)
MCH RBC QN AUTO: 29.7 PG (ref 26.8–34.3)
MCHC RBC AUTO-ENTMCNC: 31.2 G/DL (ref 31.4–37.4)
MCV RBC AUTO: 95 FL (ref 82–98)
MONOCYTES # BLD AUTO: 1.13 THOUSAND/ÂΜL (ref 0.17–1.22)
MONOCYTES NFR BLD AUTO: 15 % (ref 4–12)
NEUTROPHILS # BLD AUTO: 4.93 THOUSANDS/ÂΜL (ref 1.85–7.62)
NEUTS SEG NFR BLD AUTO: 62 % (ref 43–75)
NRBC BLD AUTO-RTO: 0 /100 WBCS
PHOSPHATE SERPL-MCNC: 5.3 MG/DL (ref 2.3–4.1)
PLATELET # BLD AUTO: 349 THOUSANDS/UL (ref 149–390)
PMV BLD AUTO: 9.1 FL (ref 8.9–12.7)
POTASSIUM SERPL-SCNC: 4.4 MMOL/L (ref 3.5–5.3)
RBC # BLD AUTO: 2.49 MILLION/UL (ref 3.88–5.62)
SODIUM SERPL-SCNC: 132 MMOL/L (ref 135–147)
WBC # BLD AUTO: 7.81 THOUSAND/UL (ref 4.31–10.16)

## 2025-04-10 PROCEDURE — 83735 ASSAY OF MAGNESIUM: CPT | Performed by: PHYSICIAN ASSISTANT

## 2025-04-10 PROCEDURE — 84100 ASSAY OF PHOSPHORUS: CPT | Performed by: PHYSICIAN ASSISTANT

## 2025-04-10 PROCEDURE — 85025 COMPLETE CBC W/AUTO DIFF WBC: CPT | Performed by: PHYSICIAN ASSISTANT

## 2025-04-10 PROCEDURE — 80048 BASIC METABOLIC PNL TOTAL CA: CPT | Performed by: PHYSICIAN ASSISTANT

## 2025-04-10 PROCEDURE — 99232 SBSQ HOSP IP/OBS MODERATE 35: CPT | Performed by: EMERGENCY MEDICINE

## 2025-04-10 PROCEDURE — 90935 HEMODIALYSIS ONE EVALUATION: CPT | Performed by: INTERNAL MEDICINE

## 2025-04-10 PROCEDURE — 82948 REAGENT STRIP/BLOOD GLUCOSE: CPT

## 2025-04-10 RX ADMIN — SEVELAMER HYDROCHLORIDE 1600 MG: 800 TABLET ORAL at 07:39

## 2025-04-10 RX ADMIN — ALLOPURINOL 100 MG: 100 TABLET ORAL at 13:13

## 2025-04-10 RX ADMIN — OXYCODONE HYDROCHLORIDE 10 MG: 10 TABLET ORAL at 13:16

## 2025-04-10 RX ADMIN — HYDROMORPHONE HYDROCHLORIDE 0.5 MG: 1 INJECTION, SOLUTION INTRAMUSCULAR; INTRAVENOUS; SUBCUTANEOUS at 07:43

## 2025-04-10 RX ADMIN — ATORVASTATIN CALCIUM 80 MG: 80 TABLET, FILM COATED ORAL at 17:57

## 2025-04-10 RX ADMIN — APIXABAN 5 MG: 5 TABLET, FILM COATED ORAL at 21:08

## 2025-04-10 RX ADMIN — OXYCODONE HYDROCHLORIDE 10 MG: 10 TABLET ORAL at 21:08

## 2025-04-10 RX ADMIN — EPOETIN ALFA 3000 UNITS: 3000 SOLUTION INTRAVENOUS; SUBCUTANEOUS at 09:44

## 2025-04-10 RX ADMIN — ACETAMINOPHEN 975 MG: 325 TABLET, FILM COATED ORAL at 21:09

## 2025-04-10 RX ADMIN — METHOCARBAMOL 750 MG: 750 TABLET ORAL at 13:12

## 2025-04-10 RX ADMIN — SEVELAMER HYDROCHLORIDE 1600 MG: 800 TABLET ORAL at 13:12

## 2025-04-10 RX ADMIN — HEPARIN SODIUM 5000 UNITS: 5000 INJECTION INTRAVENOUS; SUBCUTANEOUS at 05:00

## 2025-04-10 RX ADMIN — ACETAMINOPHEN 975 MG: 325 TABLET, FILM COATED ORAL at 13:12

## 2025-04-10 RX ADMIN — SENNOSIDES AND DOCUSATE SODIUM 1 TABLET: 50; 8.6 TABLET ORAL at 21:09

## 2025-04-10 RX ADMIN — METHOCARBAMOL 750 MG: 750 TABLET ORAL at 17:57

## 2025-04-10 RX ADMIN — EPOETIN ALFA 2000 UNITS: 2000 SOLUTION INTRAVENOUS; SUBCUTANEOUS at 09:43

## 2025-04-10 RX ADMIN — OXYCODONE HYDROCHLORIDE 10 MG: 10 TABLET ORAL at 05:02

## 2025-04-10 RX ADMIN — ACETAMINOPHEN 975 MG: 325 TABLET, FILM COATED ORAL at 05:01

## 2025-04-10 RX ADMIN — APIXABAN 5 MG: 5 TABLET, FILM COATED ORAL at 13:12

## 2025-04-10 RX ADMIN — METHOCARBAMOL 750 MG: 750 TABLET ORAL at 05:00

## 2025-04-10 RX ADMIN — CHLORHEXIDINE GLUCONATE 0.12% ORAL RINSE 15 ML: 1.2 LIQUID ORAL at 13:13

## 2025-04-10 RX ADMIN — SEVELAMER HYDROCHLORIDE 1600 MG: 800 TABLET ORAL at 17:57

## 2025-04-10 NOTE — CASE MANAGEMENT
Case Management Discharge Planning Note    Patient name Naresh Carpio  Location [facility] 618/[facility] 618-01 MRN [id number]  : 1960 Date 4/10/2025       Current Admission Date: 4/3/2025  Current Admission Diagnosis:Traumatic retroperitoneal hemorrhage   Patient Active Problem List    Diagnosis Date Noted Date Diagnosed    Pseudoaneurysm (HCC) 2025     Acute pain due to trauma 2025     ESRD on dialysis ([facility]) 2025     Wounds, multiple 2025     Traumatic retroperitoneal hemorrhage 2025     Secondary hyperparathyroidism ([facility]) 2025     At risk for electrolyte imbalance 2025     Multiple open wounds of foot 2025     Disorder of acid-base balance 2025     L2 vertebral fracture ([facility]) 2025     Non-compliance with treatment 2025     Generalized weakness 2025     Gout 2025     Closed fracture of proximal end of left humerus with routine healing 02/10/2025     Left shoulder pain 2025     ESRD (end stage renal disease) on dialysis ([facility]) 2025     Primary hypertension 2025     Anemia in ESRD (end-stage renal disease)  ([facility]) 2025     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, on chronic dialysis ([facility]) 2025     Renal lesion 2024     Chronic wound 2024     Chronic diastolic (congestive) heart failure ([facility]) 2024     Pleural effusion 2024     Elevated troponin 2024     Fall 2024     Melena 2024     Paroxysmal atrial fibrillation ([facility])      ESRD (end stage renal disease) ([facility]) 10/25/2024     Nausea 10/20/2024     Hyponatremia 10/19/2024     Acute on chronic anemia 10/14/2024     Falls 10/08/2024     PAD (peripheral artery disease) ([facility]) 10/08/2024     Closed fracture of right pelvis ([facility]) 2024     Bilateral sacral fracture, closed ([facility]) 2024     Hyperkalemia 2024     Difficulty with speech 2024     History of  amputation of hallux (HCC) 03/18/2024     Facial cellulitis 03/12/2024     Constipation 09/06/2023     Cellulitis of right foot      Polymicrobial bacterial infection 08/24/2023     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 mellitus, with necrosis of bone (HCC)      Acquired deformity of foot, right      Charcot's joint      Subacute osteomyelitis of right foot (HCC)      Open wound of right foot 08/21/2023     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 mellitus, with bone involvement without evidence of necrosis (HCC)      Diabetic ulcer of left midfoot associated with type 2 diabetes mellitus, limited to breakdown of skin (HCC)      Diabetic polyneuropathy associated with type 2 diabetes mellitus (HCC)      Diabetes mellitus type 2 with peripheral artery disease (HCC)      History of amputation of lesser toe of left foot (formerly [facility])      Onychomycosis      Status post fall 08/19/2023     Traumatic rhabdomyolysis (formerly [facility]) 08/19/2023     Osteoarthritis of left hip 07/27/2022     Severe Left Orchalgia 07/26/2022     Right shoulder pain 07/26/2022     Left hip pain 07/06/2022     Hemodialysis status (formerly [facility])      Hypervolemia      Diarrhea 01/22/2022     Anemia due to chronic kidney disease, on chronic dialysis (HCC) 01/21/2022     History of prediabetes      Hypertension      History of TIA (transient ischemic attack)      T10 vertebral fracture (formerly [facility])        LOS (days): 7  Geometric Mean LOS (GMLOS) (days): 6.9  Days to GMLOS:-0.5     OBJECTIVE:  Risk of Unplanned Readmission Score: 67.87         Current admission status: Inpatient   Preferred Pharmacy:   [facility] hospital [address] [place] [address] [address] [place] [address]  [address] [address]  Phone: [phone] Fax: [phone]    Primary Care Provider: Jeffrey Jackson    Primary Insurance: MEDICARE  Secondary Insurance:     DISCHARGE DETAILS:    CM spoke to [facility] [phone]  They have  a bed for the pt tomorrow after 11am.  CM will upload the pt's PASRR and updated clinical notes to fax  Plan for d/c tomorrow

## 2025-04-10 NOTE — PLAN OF CARE
Problem: PAIN - ADULT  Goal: Verbalizes/displays adequate comfort level or baseline comfort level  Description: Interventions:- Encourage patient to monitor pain and request assistance- Assess pain using appropriate pain scale- Administer analgesics based on type and severity of pain and evaluate response- Implement non-pharmacological measures as appropriate and evaluate response- Consider cultural and social influences on pain and pain management- Notify physician/advanced practitioner if interventions unsuccessful or patient reports new pain  Outcome: Progressing     Problem: INFECTION - ADULT  Goal: Absence or prevention of progression during hospitalization  Description: INTERVENTIONS:- Assess and monitor for signs and symptoms of infection- Monitor lab/diagnostic results- Monitor all insertion sites, i.e. indwelling lines, tubes, and drains- Monitor endotracheal if appropriate and nasal secretions for changes in amount and color- [place] appropriate cooling/warming therapies per order- Administer medications as ordered- Instruct and encourage patient and family to use good hand hygiene technique- Identify and instruct in appropriate isolation precautions for identified infection/condition  Outcome: Progressing     Problem: SAFETY ADULT  Goal: Patient will remain free of falls  Description: INTERVENTIONS:- Educate patient/family on patient safety including physical limitations- Instruct patient to call for assistance with activity - Consult OT/PT to assist with strengthening/mobility - Keep Call bell within reach- Keep bed low and locked with side rails adjusted as appropriate- Keep care items and personal belongings within reach- Initiate and maintain comfort rounds- Make Fall Risk Sign visible to staff- Offer Toileting every 2 Hours, in advance of need- Initiate/Maintain bed/chair alarm- Obtain necessary fall risk management equipment: - Apply yellow socks and bracelet for high fall risk patients-  Consider moving patient to room near nurses station  Outcome: Progressing  Goal: Maintain or return to baseline ADL function  Description: INTERVENTIONS:-  Assess patient's ability to carry out ADLs; assess patient's baseline for ADL function and identify physical deficits which impact ability to perform ADLs (bathing, care of mouth/teeth, toileting, grooming, dressing, etc.)- Assess/evaluate cause of self-care deficits - Assess range of motion- Assess patient's mobility; develop plan if impaired- Assess patient's need for assistive devices and provide as appropriate- Encourage maximum independence but intervene and supervise when necessary- Involve family in performance of ADLs- Assess for home care needs following discharge - Consider OT consult to assist with ADL evaluation and planning for discharge- Provide patient education as appropriate  Outcome: Progressing  Goal: Maintains/Returns to pre admission functional level  Description: INTERVENTIONS:- Perform AM-PAC 6 Click Basic Mobility/ Daily Activity assessment daily.- Set and communicate daily mobility goal to care team and patient/family/caregiver. - Collaborate with rehabilitation services on mobility goals if consulted- Reposition patient every 2 hours.- Out of bed for meals 3 times a day- Out of bed for toileting- Record patient progress and toleration of activity level   Outcome: Progressing     Problem: DISCHARGE PLANNING  Goal: Discharge to home or other facility with appropriate resources  Description: INTERVENTIONS:- Identify barriers to discharge w/patient and caregiver- Arrange for needed discharge resources and transportation as appropriate- Identify discharge learning needs (meds, wound care, etc.)- Arrange for interpretive services to assist at discharge as needed- Refer to Case Management Department for coordinating discharge planning if the patient needs post-hospital services based on physician/advanced practitioner order or complex needs  related to functional status, cognitive ability, or social support system  Outcome: Progressing     Problem: Knowledge Deficit  Goal: Patient/family/caregiver demonstrates understanding of disease process, treatment plan, medications, and discharge instructions  Description: Complete learning assessment and assess knowledge base.Interventions:- Provide teaching at level of understanding- Provide teaching via preferred learning methods  Outcome: Progressing     Problem: Prexisting or High Potential for Compromised Skin Integrity  Goal: Skin integrity is maintained or improved  Description: INTERVENTIONS:- Identify patients at risk for skin breakdown- Assess and monitor skin integrity- Assess and monitor nutrition and hydration status- Monitor labs - Assess for incontinence - Turn and reposition patient- Assist with mobility/ambulation- Relieve pressure over bony prominences- Avoid friction and shearing- Provide appropriate hygiene as needed including keeping skin clean and dry- Evaluate need for skin moisturizer/barrier cream- Collaborate with interdisciplinary team - Patient/family teaching- Consider wound care consult   Outcome: Progressing     Problem: METABOLIC, FLUID AND ELECTROLYTES - ADULT  Goal: Electrolytes maintained within normal limits  Description: INTERVENTIONS:- Monitor labs and assess patient for signs and symptoms of electrolyte imbalances- Administer electrolyte replacement as ordered- Monitor response to electrolyte replacements, including repeat lab results as appropriate- Instruct patient on fluid and nutrition as appropriate  Outcome: Progressing  Goal: Fluid balance maintained  Description: INTERVENTIONS:- Monitor labs - Monitor I/O and WT- Instruct patient on fluid and nutrition as appropriate- Assess for signs & symptoms of volume excess or deficit  Outcome: Progressing

## 2025-04-10 NOTE — ASSESSMENT & PLAN NOTE
TTS at [facility] IN [place]  Access: Right IJ permcath  EDW: 99 kg per previous hospital records  Continue with current dialysis schedule  Due for HD Saturday/12/25

## 2025-04-10 NOTE — PROGRESS NOTES
Progress Note - Trauma   Name: Naresh Carpio 64 y.o. male I MRN: [id number]  Unit/Bed#: [facility] 618-01 I Date of Admission: 4/3/2025   Date of Service: 4/10/2025 I Hospital Day: 7    Assessment & Plan  Traumatic retroperitoneal hemorrhage  - CTA CAP 4/2: right retroperitoneal hematoma with areas of active contrast extravasation   - Appreciate IR consult and recommendations  - 4/3 IR embolization of branch of the R deep circumflex iliac artery and R superior gluteal artery   - 4/7 IR embolization of pseudoaneurysm  - 4/4 1 u pRBC  - 4/5 2 u pRBC  - 4/7 1 u pRBC  - Repeat CTA 4/5 indicated residual 5 mm pseudoaneurysm within the left gluteal musculature. - now s/p IR embolization of pseudoaneurysm  - Continue to monitor Hgb  - Multimodal pain control  - 4/9 initiated SQH for DVT ppx - will transition to Eliquis today pending hgb stability on AM labs  - PT and OT as indicated  Acute on chronic anemia  Lab Results   Component Value Date    EGFR 9 04/09/2025    EGFR 5 04/08/2025    EGFR 7 04/07/2025    CREATININE 5.53 (H) 04/09/2025    CREATININE 8.57 (H) 04/08/2025    CREATININE 7.10 (H) 04/07/2025     - Chronic anemia due to ESRD, baseline Hgb is 8-10  - Patient currently has acute blood loss anemia due to retroperitoneal hematoma  - s/p 4 units of pRBC during this hospitalization  - Goal is Hgb > 7  - Continue to monitor Hgb and transfuse for Hgb < 7 or symptomatic anemia  - 4/9 start SQH for DVT ppx and check 4/10 AM Labs, If hgb remains stable recommend restarting home Eliquis  Wounds, multiple  - Bilateral foot wounds; R anterior shin wound  - Wound care consult and recommendations appreciated  L2 vertebral fracture (HCC)  - CT CAP 4/2: Fracture of L2 vertebral body extending to the superior endplate; seen on scan from 3/24 as well and was admitted to [facility]  - Has an LSO brace  - PT/OT; OOB and ambulation as able   Paroxysmal atrial fibrillation (HCC)  - Rate controlled  - Home Eliquis 5mg BID and aspirin 81mg  currently held  - S/p Kcentra on presentation 4/2  - SQH started 4/9, hgb stable this am, start Eliquis  Chronic diastolic (congestive) heart failure (HCC)  Wt Readings from Last 3 Encounters:   04/08/25 100 kg (221 lb 1.9 oz)   03/24/25 107 kg (234 lb 12.6 oz)   02/27/25 102 kg (224 lb 13.9 oz)     - Continue procardia xl 30mg   - No acute exacerbation  ESRD (end stage renal disease) on dialysis ([facility])  Lab Results   Component Value Date    EGFR 9 04/09/2025    EGFR 5 04/08/2025    EGFR 7 04/07/2025    CREATININE 5.53 (H) 04/09/2025    CREATININE 8.57 (H) 04/08/2025    CREATININE 7.10 (H) 04/07/2025   - Appreciate Nephrology consult and recommendations  - Continue TTS dilaysis via permacath   - Last Dialysis 4/8, due tomorrow  - Sevelamer 1,600mg TID   Primary hypertension  - Continue home procardia     History of prediabetes  Lab Results   Component Value Date    HGBA1C 5.7 (H) 02/09/2025       Recent Labs     04/09/25  0810 04/09/25  1124 04/09/25  1811 04/10/25  0708   POCGLU 99 134 115 102       Blood Sugar Average: Last 72 hrs:  (P) 104.1  - Last A1c 5.7, pre-diabetic.   - Not on home medications  - Not currently requiring SSI. Continue to monitor   Acute pain due to trauma  - Acute pain secondary to traumatic injuries.  - Continue multimodal analgesic regimen.  - Appreciate APS evaluation and recommendations.  - Bowel regimen as long as using opioids.  - Continue to monitor pain and adjust regimen as indicated.    Pseudoaneurysm (HCC)  - Pelvic angiogram demonstrated pseudoaneurysms present at distal branches of the left inferior gluteal artery   - s/p artery embolization with coils and gelfoam with IR on 4/7  - Monitor hgb    Bowel Regimen: Senokot  VTE Prophylaxis:VTE covered by:  heparin (porcine), Subcutaneous, 5,000 Units at 04/10/25 0500        Disposition: Continue med/surg status with ongoing medical management  Please contact the SecureChat role for the Trauma service with any  questions/concerns.    24 Hour Events : No overnight events  Subjective : Patient reports he feels good but tired. He is tolerating his diet and moving his bowels. His pain is well controlled.     Objective :  Temp:  [98.1 °F (36.7 °C)-98.8 °F (37.1 °C)] 98.2 °F (36.8 °C)  HR:  [58-67] 67  BP: (129-150)/(51-60) 150/60  Resp:  [15-20] 15  SpO2:  [95 %-98 %] 97 %  O2 Device: None (Room air)    I/O         04/08 0701 04/09 0700 04/09 0701  04/10 0700 04/10 0701 04/11 0700    P.O. 290 1418     I.V. (mL/kg) 500 (5)      Blood       Total Intake(mL/kg) 790 (7.9) 1418 (14.2)     Urine (mL/kg/hr) 125 (0.1) 0 (0)     Other 2500      Total Output 2625 0     Net -1835 +1418                  Lines/Drains/Airways       Active Status       Name Placement date Placement time Site Days    HD Permanent Double Catheter 08/30/23  --  Internal jugular  589                  Gen: No acute distress resting comfortably in chair receiving dialysis  Neuro: AAOx3, GCS 15, no focal neurodeficit  HEENT: PERRLA, EOMI, mucous membranes moist  Cards: RRR, S1, S2 without murmur rub or gallop  Pulm: Clear to auscultation bilaterally without wheezes rales or rhonchi  GI: Soft, nontender, nondistended  : Voiding independently  MSK: Extremities nontender without deformity  Skin: Warm, dry, intact           Lab Results: I have reviewed the following results:  Recent Labs     04/07/25  1126 04/07/25  1602 04/09/25  0541   WBC  --    < > 8.98   HGB  --    < > 7.3*   HCT  --    < > 23.5*   PLT  --    < > 327   SODIUM  --    < > 132*   K  --    < > 4.1   CL  --    < > 94*   CO2  --    < > 26   BUN  --    < > 34*   CREATININE  --    < > 5.53*   GLUC  --    < > 98   MG  --   --  2.2   PHOS  --   --  4.3*   INR 1.20*  --   --     < > = values in this interval not displayed.

## 2025-04-10 NOTE — ASSESSMENT & PLAN NOTE
- CTA CAP 4/2: right retroperitoneal hematoma with areas of active contrast extravasation   - Appreciate IR consult and recommendations  - 4/3 IR embolization of branch of the R deep circumflex iliac artery and R superior gluteal artery   - 4/7 IR embolization of pseudoaneurysm  - 4/4 1 u pRBC  - 4/5 2 u pRBC  - 4/7 1 u pRBC  - Repeat CTA 4/5 indicated residual 5 mm pseudoaneurysm within the left gluteal musculature. - now s/p IR embolization of pseudoaneurysm  - Continue to monitor Hgb  - Multimodal pain control  - 4/9 initiated SQH for DVT ppx - will transition to Eliquis today pending hgb stability on AM labs  - PT and OT as indicated

## 2025-04-10 NOTE — ASSESSMENT & PLAN NOTE
- Rate controlled  - Home Eliquis 5mg BID and aspirin 81mg currently held  - S/p Kcentra on presentation 4/2  - SQH started 4/9, hgb stable this am, start Eliquis

## 2025-04-10 NOTE — PLAN OF CARE
Problem: PAIN - ADULT  Goal: Verbalizes/displays adequate comfort level or baseline comfort level  Description: Interventions:- Encourage patient to monitor pain and request assistance- Assess pain using appropriate pain scale- Administer analgesics based on type and severity of pain and evaluate response- Implement non-pharmacological measures as appropriate and evaluate response- Consider cultural and social influences on pain and pain management- Notify physician/advanced practitioner if interventions unsuccessful or patient reports new pain  4/10/2025 1404 by Carla Thomas RN  Outcome: Progressing  4/10/2025 0749 by Carla Thomas RN  Outcome: Progressing     Problem: INFECTION - ADULT  Goal: Absence or prevention of progression during hospitalization  Description: INTERVENTIONS:- Assess and monitor for signs and symptoms of infection- Monitor lab/diagnostic results- Monitor all insertion sites, i.e. indwelling lines, tubes, and drains- Monitor endotracheal if appropriate and nasal secretions for changes in amount and color- [place] appropriate cooling/warming therapies per order- Administer medications as ordered- Instruct and encourage patient and family to use good hand hygiene technique- Identify and instruct in appropriate isolation precautions for identified infection/condition  4/10/2025 1404 by Carla Thomas RN  Outcome: Progressing  4/10/2025 0749 by Carla Thomas RN  Outcome: Progressing     Problem: SAFETY ADULT  Goal: Patient will remain free of falls  Description: INTERVENTIONS:- Educate patient/family on patient safety including physical limitations- Instruct patient to call for assistance with activity - Consult OT/PT to assist with strengthening/mobility - Keep Call bell within reach- Keep bed low and locked with side rails adjusted as appropriate- Keep care items and personal belongings within reach- Initiate and maintain comfort rounds- Make Fall Risk Sign visible to  staff- Offer Toileting every 2 Hours, in advance of need- Initiate/Maintain bed/chair alarm- Obtain necessary fall risk management equipment: - Apply yellow socks and bracelet for high fall risk patients- Consider moving patient to room near nurses station  4/10/2025 1404 by Carla Thomas RN  Outcome: Progressing  4/10/2025 0749 by Carla Thomas RN  Outcome: Progressing  Goal: Maintain or return to baseline ADL function  Description: INTERVENTIONS:-  Assess patient's ability to carry out ADLs; assess patient's baseline for ADL function and identify physical deficits which impact ability to perform ADLs (bathing, care of mouth/teeth, toileting, grooming, dressing, etc.)- Assess/evaluate cause of self-care deficits - Assess range of motion- Assess patient's mobility; develop plan if impaired- Assess patient's need for assistive devices and provide as appropriate- Encourage maximum independence but intervene and supervise when necessary- Involve family in performance of ADLs- Assess for home care needs following discharge - Consider OT consult to assist with ADL evaluation and planning for discharge- Provide patient education as appropriate  4/10/2025 1404 by Carla Thomas RN  Outcome: Progressing  4/10/2025 0749 by Carla Thomas RN  Outcome: Progressing  Goal: Maintains/Returns to pre admission functional level  Description: INTERVENTIONS:- Perform AM-PAC 6 Click Basic Mobility/ Daily Activity assessment daily.- Set and communicate daily mobility goal to care team and patient/family/caregiver. - Collaborate with rehabilitation services on mobility goals if consulted- Reposition patient every 2 hours.- Out of bed for meals 3 times a day- Out of bed for toileting- Record patient progress and toleration of activity level   4/10/2025 1404 by Carla Thomas RN  Outcome: Progressing  4/10/2025 0749 by Carla Thomas RN  Outcome: Progressing     Problem: DISCHARGE PLANNING  Goal: Discharge to home  or other facility with appropriate resources  Description: INTERVENTIONS:- Identify barriers to discharge w/patient and caregiver- Arrange for needed discharge resources and transportation as appropriate- Identify discharge learning needs (meds, wound care, etc.)- Arrange for interpretive services to assist at discharge as needed- Refer to Case Management Department for coordinating discharge planning if the patient needs post-hospital services based on physician/advanced practitioner order or complex needs related to functional status, cognitive ability, or social support system  4/10/2025 1404 by Carla Thomas RN  Outcome: Progressing  4/10/2025 0749 by Carla Thomas RN  Outcome: Progressing     Problem: Knowledge Deficit  Goal: Patient/family/caregiver demonstrates understanding of disease process, treatment plan, medications, and discharge instructions  Description: Complete learning assessment and assess knowledge base.Interventions:- Provide teaching at level of understanding- Provide teaching via preferred learning methods  4/10/2025 1404 by Carla Thomas RN  Outcome: Progressing  4/10/2025 0749 by Carla Thomas RN  Outcome: Progressing     Problem: Prexisting or High Potential for Compromised Skin Integrity  Goal: Skin integrity is maintained or improved  Description: INTERVENTIONS:- Identify patients at risk for skin breakdown- Assess and monitor skin integrity- Assess and monitor nutrition and hydration status- Monitor labs - Assess for incontinence - Turn and reposition patient- Assist with mobility/ambulation- Relieve pressure over bony prominences- Avoid friction and shearing- Provide appropriate hygiene as needed including keeping skin clean and dry- Evaluate need for skin moisturizer/barrier cream- Collaborate with interdisciplinary team - Patient/family teaching- Consider wound care consult   4/10/2025 1404 by Carla Thomas RN  Outcome: Progressing  4/10/2025 0749 by  Carla Thomas RN  Outcome: Progressing     Problem: METABOLIC, FLUID AND ELECTROLYTES - ADULT  Goal: Electrolytes maintained within normal limits  Description: INTERVENTIONS:- Monitor labs and assess patient for signs and symptoms of electrolyte imbalances- Administer electrolyte replacement as ordered- Monitor response to electrolyte replacements, including repeat lab results as appropriate- Instruct patient on fluid and nutrition as appropriate  4/10/2025 1404 by Carla Thomas RN  Outcome: Progressing  4/10/2025 0749 by Carla Thomas RN  Outcome: Progressing  Goal: Fluid balance maintained  Description: INTERVENTIONS:- Monitor labs - Monitor I/O and WT- Instruct patient on fluid and nutrition as appropriate- Assess for signs & symptoms of volume excess or deficit  4/10/2025 1404 by Carla Thmoas RN  Outcome: Progressing  4/10/2025 0749 by Carla Thomas RN  Outcome: Progressing     Problem: Nutrition/Hydration-ADULT  Goal: Nutrient/Hydration intake appropriate for improving, restoring or maintaining nutritional needs  Description: Monitor and assess patient's nutrition/hydration status for malnutrition. Collaborate with interdisciplinary team and initiate plan and interventions as ordered.  Monitor patient's weight and dietary intake as ordered or per policy. Utilize nutrition screening tool and intervene as necessary. Determine patient's food preferences and provide high-protein, high-caloric foods as appropriate. INTERVENTIONS:- Monitor oral intake, urinary output, labs, and treatment plans- Assess nutrition and hydration status and recommend course of action- Evaluate amount of meals eaten- Assist patient with eating if necessary - Allow adequate time for meals- Recommend/ encourage appropriate diets, oral nutritional supplements, and vitamin/mineral supplements- Order, calculate, and assess calorie counts as needed- Recommend, monitor, and adjust tube feedings and TPN/PPN based on  assessed needs- Assess need for intravenous fluids- Provide specific nutrition/hydration education as appropriate- Include patient/family/caregiver in decisions related to nutrition  Outcome: Progressing

## 2025-04-10 NOTE — PROGRESS NOTES
Progress Note - Nephrology   Name: Naresh Carpio 64 y.o. male I MRN: [id number]  Unit/Bed#: [facility] 618-01 I Date of Admission: 4/3/2025   Date of Service: 4/10/2025 I Hospital Day: 7       HEMODIALYSIS PROCEDURE NOTE  The patient was seen and examined on hemodialysis.  Time: 4 hours  Sodium: 138 Blood flow: 350   Dialyzer: F160 Potassium: 3K35 Dialysate flow: 500   Access: R IJ HD Permcath Bicarbonate: 35 Ultrafiltration goal: 2-2.5L   Medications on HD: Epogen 5000 units   Patient seen while on HD treatment.  Tolerating well.  No complaints or concerns at this time.  Assessment & Plan  ESRD (end stage renal disease) on dialysis ([facility])  TTS at [facility] [place]  Access: Right IJ permcath  EDW: 99 kg per previous hospital records  Continue with current dialysis schedule  Due for HD Saturday/12/25  Traumatic retroperitoneal hemorrhage  History of a fall onto a rail a few days ago. Continued to have progressive pain  Presented to [place] ED, found to have retroperitoneal hemorrhage with areas of active contrast extravasation. Transferred to [facility]  Required 1u PRBC 4/7  Management per primary service  Primary hypertension  BP currently stable  Home regimen: Nifedipine 30 mg daily   Monitor blood pressures  Anemia due to chronic kidney disease, on chronic dialysis (HCC)  Start SURINDER therapy 5000 units q. HD  Current hemoglobin 7.4   s/p 1u PRBC 4/7  At risk for electrolyte imbalance  Recent potassium 4.1, calcium 8.9   HD TTS  Chronic diastolic (congestive) heart failure (HCC)  Echo Oct. 2024 with LVEF 60-65%  Monitor volume status   UF with HD  Acute on chronic anemia  Home regimen: Renagel 800 mg TID per chart review  Most recent Phosphorus 4.3  HD TTS      Review of Systems  Patient seen and examined while on HD treatment.  Only complains of some mild right shoulder pain.  Denies shortness of breath and chest pain.  Tolerating HD well.  Overall, no new concerns or questions at this time.   Review of Systems   Constitutional:   Negative for fever.   Respiratory:  Negative for cough and shortness of breath.    Cardiovascular:  Negative for chest pain and palpitations.   Gastrointestinal:  Negative for abdominal pain, constipation, diarrhea, nausea and vomiting.   Skin:  Negative for color change.   Neurological:  Negative for dizziness, light-headedness and headaches.         Physical Exam:  Current Weight: Weight - Scale: 100 kg (221 lb 1.9 oz)  Vitals:    04/10/25 0930 04/10/25 1000 04/10/25 1030 04/10/25 1100   BP: 127/52 151/80 117/80 138/72   BP Location:       Pulse: 67 66 66 68   Resp: 18 18 18 18   Temp:       TempSrc:       SpO2:       Weight:       Height:           Physical Exam  Vitals and nursing note reviewed. Exam conducted with a chaperone present (HD nurse present).   Constitutional:       General: He is not in acute distress.     Appearance: He is ill-appearing.   Cardiovascular:      Rate and Rhythm: Normal rate.      Pulses: Normal pulses.      Heart sounds: No murmur heard.     No gallop.   Pulmonary:      Effort: Pulmonary effort is normal. No respiratory distress.      Breath sounds: Normal breath sounds. No wheezing or rales.   Abdominal:      General: Bowel sounds are normal. There is no distension.      Palpations: Abdomen is soft.      Tenderness: There is no abdominal tenderness.   Musculoskeletal:      Right lower leg: Edema present.      Left lower leg: Edema present.   Skin:     General: Skin is warm and dry.      Capillary Refill: Capillary refill takes less than 2 seconds.   Neurological:      Mental Status: He is alert and oriented to person, place, and time. Mental status is at baseline.   Psychiatric:         Mood and Affect: Mood normal.         Behavior: Behavior normal.        Medications:    Current Facility-Administered Medications:     acetaminophen (TYLENOL) tablet 975 mg, 975 mg, Oral, Q8H Nallely SOMMER PA-C, 975 mg at 04/10/25 0501    allopurinol (ZYLOPRIM) tablet 100 mg, 100 mg, Oral,  Daily, KATHRYN DoyleC, 100 mg at 04/09/25 0846    apixaban (ELIQUIS) tablet 5 mg, 5 mg, Oral, BID, Jessica Pacheco PA-C    atorvastatin (LIPITOR) tablet 80 mg, 80 mg, Oral, Daily With Dinner, CARSON Doyle-C, 80 mg at 04/09/25 1824    chlorhexidine (PERIDEX) 0.12 % oral rinse 15 mL, 15 mL, Mouth/Throat, Q12H KEMAL, CARSON Doyle-C, 15 mL at 04/09/25 2143    epoetin jessica (EPOGEN,PROCRIT) injection 3,000 Units, 3,000 Units, Intravenous, After Dialysis, 3,000 Units at 04/10/25 0944 **AND** epoetin jessica (EPOGEN,PROCRIT) injection 2,000 Units, 2,000 Units, Intravenous, After Dialysis, Anthony Mixon DO, 2,000 Units at 04/10/25 0943    gabapentin (NEURONTIN) capsule 100 mg, 100 mg, Oral, Once per day on Monday Wednesday Friday, CARSON Doyle-C, 100 mg at 04/09/25 0847    HYDROmorphone (DILAUDID) injection 0.5 mg, 0.5 mg, Intravenous, Q6H PRN, Kadeem Corona PA-C, 0.5 mg at 04/10/25 0743    lidocaine (LIDODERM) 5 % patch 1 patch, 1 patch, Topical, Daily PRN, Nallely Hodge PA-C, 1 patch at 04/05/25 1146    methocarbamol (ROBAXIN) tablet 750 mg, 750 mg, Oral, Q6H KEMAL, Daryl Segal MD, 750 mg at 04/10/25 0500    naloxone (NARCAN) 0.04 mg/mL syringe 0.04 mg, 0.04 mg, Intravenous, Q1MIN PRN, Nallely Hodge PA-C    NIFEdipine (PROCARDIA XL) 24 hr tablet 30 mg, 30 mg, Oral, After Dinner, GRANT Zuluaga, 30 mg at 04/09/25 1824    oxyCODONE (ROXICODONE) IR tablet 5 mg, 5 mg, Oral, Q3H PRN **OR** oxyCODONE (ROXICODONE) immediate release tablet 10 mg, 10 mg, Oral, Q3H PRN, Daryl Segal MD, 10 mg at 04/10/25 0502    senna-docusate sodium (SENOKOT S) 8.6-50 mg per tablet 1 tablet, 1 tablet, Oral, HS, Nallely Hodge PA-C, 1 tablet at 04/09/25 2142    sevelamer (RENAGEL) tablet 1,600 mg, 1,600 mg, Oral, TID With Meals, CARSON Doyle-C, 1,600 mg at 04/10/25 0739    Laboratory Results:  Results from last 7 days   Lab Units 04/10/25  0820 04/09/25  0545  "04/08/25  1414 04/08/25  0801 04/07/25  1602 04/07/25  0602 04/06/25  0910 04/05/25  1410 04/05/25  0840 04/04/25  1857 04/04/25  0628   WBC Thousand/uL 7.81 8.98  --  11.97*  --  11.37* 11.08*  --   --   --  8.27   HEMOGLOBIN g/dL 7.4* 7.3* 7.0* 7.0* 7.0* 6.5* 7.2*   < > 7.2*   < > 6.5*   HEMATOCRIT % 23.7* 23.5* 22.7* 21.6* 21.4* 21.4* 22.5*   < > 22.0*   < > 20.9*   PLATELETS Thousands/uL 349 327  --  292  --  312 282  --   --   --  267   SODIUM mmol/L 132* 132*  --  129*  --  129* 131*  --  130*  --  133*   POTASSIUM mmol/L 4.4 4.1  --  5.6*  --  5.0 4.6  --  4.5  --  4.2   CHLORIDE mmol/L 93* 94*  --  93*  --  93* 93*  --  91*  --  95*   CO2 mmol/L 28 26  --  23  --  25 28  --  28  --  27   BUN mg/dL 52* 34*  --  62*  --  49* 39*  --  47*  --  33*   CREATININE mg/dL 7.50* 5.53*  --  8.57*  --  7.10* 5.82*  --  6.42*  --  4.75*   CALCIUM mg/dL 9.0 8.9  --  8.9  --  8.9 9.1  --  9.0  --  8.9   MAGNESIUM mg/dL 2.2 2.2  --   --   --   --   --   --   --   --  2.2   PHOSPHORUS mg/dL 5.3* 4.3*  --   --   --   --   --   --   --   --  4.9*    < > = values in this interval not displayed.       Medical records have been reviewed through [facility] and care everywhere for this patient encounter    Portions of the record may have been created with voice recognition software. Occasional wrong word or \"sound a like\" substitutions may have occurred due to the inherent limitations of voice recognition software. Read the chart carefully and recognize,   "

## 2025-04-10 NOTE — PLAN OF CARE
Pre-tx:  UF 2-2.5 L as tolerated per order.  BP stable to start HD.  Labs drawn prior to HD, pt initiated on 3K bath per serum K of 4.1 yesterday.  HD permcath accessed without issue.         Post-Dialysis RN Treatment Note    Blood Pressure:  Pre 160/80 mm/Hg  Post 143/74 mmHg   EDW:  99 kg    Weight:  Pre 101.2 kg   Post 98.6 kg   Mode of weight measurement: Standing Scale   Volume Removed:  2600 ml    Treatment duration: 4 hours    NS given:   0   Treatment shortened no   Medications given during Rx: Epogen 5000 units   Estimated Kt/V:  1.01   Access type: Permacath/TDC   Needle Gauge: na   Access Issues: No    Report called to primary nurse:   Yes           Problem: METABOLIC, FLUID AND ELECTROLYTES - ADULT  Goal: Electrolytes maintained within normal limits  Description: INTERVENTIONS:- Monitor labs and assess patient for signs and symptoms of electrolyte imbalances- Administer electrolyte replacement as ordered- Monitor response to electrolyte replacements, including repeat lab results as appropriate- Instruct patient on fluid and nutrition as appropriate  Outcome: Progressing  Goal: Fluid balance maintained  Description: INTERVENTIONS:- Monitor labs - Monitor I/O and WT- Instruct patient on fluid and nutrition as appropriate- Assess for signs & symptoms of volume excess or deficit  Outcome: Progressing

## 2025-04-10 NOTE — ASSESSMENT & PLAN NOTE
History of a fall onto a rail a few days ago. Continued to have progressive pain  Presented to [place] ED, found to have retroperitoneal hemorrhage with areas of active contrast extravasation. Transferred to [facility]  Required 1u PRBC 4/7  Management per primary service

## 2025-04-10 NOTE — ASSESSMENT & PLAN NOTE
Lab Results   Component Value Date    EGFR 9 04/09/2025    EGFR 5 04/08/2025    EGFR 7 04/07/2025    CREATININE 5.53 (H) 04/09/2025    CREATININE 8.57 (H) 04/08/2025    CREATININE 7.10 (H) 04/07/2025   - [facility] Nephrology consult and recommendations  - Continue TTS dilaysis via permacath   - Last Dialysis 4/8, due tomorrow  - Sevelamer 1,600mg TID

## 2025-04-10 NOTE — ASSESSMENT & PLAN NOTE
Lab Results   Component Value Date    HGBA1C 5.7 (H) 02/09/2025       Recent Labs     04/09/25  0810 04/09/25  1124 04/09/25  1811 04/10/25  0708   POCGLU 99 134 115 102       Blood Sugar Average: Last 72 hrs:  (P) 104.1  - Last A1c 5.7, pre-diabetic.   - Not on home medications  - Not currently requiring SSI. Continue to monitor

## 2025-04-11 ENCOUNTER — HOSPITAL ENCOUNTER (INPATIENT)
Facility: HOSPITAL | Age: 65
LOS: 26 days | Discharge: HOME HEALTH | DRG: 939 | End: 2025-05-07
Attending: INTERNAL MEDICINE | Admitting: INTERNAL MEDICINE
Payer: MEDICARE

## 2025-04-11 VITALS
DIASTOLIC BLOOD PRESSURE: 55 MMHG | TEMPERATURE: 99 F | SYSTOLIC BLOOD PRESSURE: 153 MMHG | BODY MASS INDEX: 28.78 KG/M2 | HEART RATE: 61 BPM | RESPIRATION RATE: 18 BRPM | WEIGHT: 217.15 LBS | HEIGHT: 73 IN | OXYGEN SATURATION: 98 %

## 2025-04-11 DIAGNOSIS — E11.42 DIABETIC POLYNEUROPATHY ASSOCIATED WITH TYPE 2 DIABETES MELLITUS (HCC): ICD-10-CM

## 2025-04-11 DIAGNOSIS — Z91.89 AT RISK FOR CONSTIPATION: ICD-10-CM

## 2025-04-11 DIAGNOSIS — S36.892A TRAUMATIC RETROPERITONEAL HEMATOMA, INITIAL ENCOUNTER: ICD-10-CM

## 2025-04-11 DIAGNOSIS — S42.252A DISPLACED FRACTURE OF GREATER TUBEROSITY OF LEFT HUMERUS, INITIAL ENCOUNTER FOR CLOSED FRACTURE: ICD-10-CM

## 2025-04-11 DIAGNOSIS — Z99.2 ESRD ON DIALYSIS (HCC): ICD-10-CM

## 2025-04-11 DIAGNOSIS — H43.399 FLOATERS: ICD-10-CM

## 2025-04-11 DIAGNOSIS — Z74.09 IMPAIRED MOBILITY AND ACTIVITIES OF DAILY LIVING: ICD-10-CM

## 2025-04-11 DIAGNOSIS — I73.9 PAD (PERIPHERAL ARTERY DISEASE) (HCC): ICD-10-CM

## 2025-04-11 DIAGNOSIS — I16.0 HYPERTENSIVE URGENCY: ICD-10-CM

## 2025-04-11 DIAGNOSIS — Z78.9 IMPAIRED MOBILITY AND ACTIVITIES OF DAILY LIVING: ICD-10-CM

## 2025-04-11 DIAGNOSIS — S32.029A L2 VERTEBRAL FRACTURE (HCC): Primary | ICD-10-CM

## 2025-04-11 DIAGNOSIS — G47.00 INSOMNIA, UNSPECIFIED TYPE: ICD-10-CM

## 2025-04-11 DIAGNOSIS — I48.0 PAROXYSMAL ATRIAL FIBRILLATION (HCC): ICD-10-CM

## 2025-04-11 DIAGNOSIS — N18.6 ESRD ON DIALYSIS (HCC): ICD-10-CM

## 2025-04-11 DIAGNOSIS — M10.9 GOUT: ICD-10-CM

## 2025-04-11 DIAGNOSIS — S91.309A MULTIPLE OPEN WOUNDS OF FOOT: ICD-10-CM

## 2025-04-11 DIAGNOSIS — N18.6 ESRD (END STAGE RENAL DISEASE) (HCC): ICD-10-CM

## 2025-04-11 PROBLEM — H53.9 VISUAL DISTURBANCE: Status: ACTIVE | Noted: 2025-04-11

## 2025-04-11 LAB
ANION GAP SERPL CALCULATED.3IONS-SCNC: 9 MMOL/L (ref 4–13)
BUN SERPL-MCNC: 30 MG/DL (ref 5–25)
CALCIUM SERPL-MCNC: 9.1 MG/DL (ref 8.4–10.2)
CHLORIDE SERPL-SCNC: 96 MMOL/L (ref 96–108)
CO2 SERPL-SCNC: 28 MMOL/L (ref 21–32)
CREAT SERPL-MCNC: 4.63 MG/DL (ref 0.6–1.3)
ERYTHROCYTE [DISTWIDTH] IN BLOOD BY AUTOMATED COUNT: 16.6 % (ref 11.6–15.1)
GFR SERPL CREATININE-BSD FRML MDRD: 12 ML/MIN/1.73SQ M
GLUCOSE SERPL-MCNC: 91 MG/DL (ref 65–140)
GLUCOSE SERPL-MCNC: 93 MG/DL (ref 65–140)
HCT VFR BLD AUTO: 25 % (ref 36.5–49.3)
HGB BLD-MCNC: 7.4 G/DL (ref 12–17)
MCH RBC QN AUTO: 28.7 PG (ref 26.8–34.3)
MCHC RBC AUTO-ENTMCNC: 29.6 G/DL (ref 31.4–37.4)
MCV RBC AUTO: 97 FL (ref 82–98)
PLATELET # BLD AUTO: 378 THOUSANDS/UL (ref 149–390)
PMV BLD AUTO: 9.3 FL (ref 8.9–12.7)
POTASSIUM SERPL-SCNC: 4.4 MMOL/L (ref 3.5–5.3)
RBC # BLD AUTO: 2.58 MILLION/UL (ref 3.88–5.62)
SODIUM SERPL-SCNC: 133 MMOL/L (ref 135–147)
WBC # BLD AUTO: 7.55 THOUSAND/UL (ref 4.31–10.16)

## 2025-04-11 PROCEDURE — 82948 REAGENT STRIP/BLOOD GLUCOSE: CPT

## 2025-04-11 PROCEDURE — NC001 PR NO CHARGE: Performed by: STUDENT IN AN ORGANIZED HEALTH CARE EDUCATION/TRAINING PROGRAM

## 2025-04-11 PROCEDURE — 99223 1ST HOSP IP/OBS HIGH 75: CPT | Performed by: INTERNAL MEDICINE

## 2025-04-11 PROCEDURE — 80048 BASIC METABOLIC PNL TOTAL CA: CPT | Performed by: PHYSICIAN ASSISTANT

## 2025-04-11 PROCEDURE — 85027 COMPLETE CBC AUTOMATED: CPT | Performed by: PHYSICIAN ASSISTANT

## 2025-04-11 PROCEDURE — 99232 SBSQ HOSP IP/OBS MODERATE 35: CPT | Performed by: INTERNAL MEDICINE

## 2025-04-11 PROCEDURE — NC001 PR NO CHARGE: Performed by: EMERGENCY MEDICINE

## 2025-04-11 RX ORDER — OXYCODONE HYDROCHLORIDE 5 MG/1
5 TABLET ORAL EVERY 4 HOURS PRN
Refills: 0 | Status: DISCONTINUED | OUTPATIENT
Start: 2025-04-11 | End: 2025-05-07 | Stop reason: HOSPADM

## 2025-04-11 RX ORDER — METHOCARBAMOL 750 MG/1
750 TABLET, FILM COATED ORAL EVERY 6 HOURS SCHEDULED
Status: DISCONTINUED | OUTPATIENT
Start: 2025-04-12 | End: 2025-04-13

## 2025-04-11 RX ORDER — AMOXICILLIN 250 MG
1 CAPSULE ORAL
Status: DISCONTINUED | OUTPATIENT
Start: 2025-04-11 | End: 2025-04-15

## 2025-04-11 RX ORDER — AMOXICILLIN 250 MG
1 CAPSULE ORAL
Status: ON HOLD
Start: 2025-04-11

## 2025-04-11 RX ORDER — ATORVASTATIN CALCIUM 80 MG/1
80 TABLET, FILM COATED ORAL
Status: DISCONTINUED | OUTPATIENT
Start: 2025-04-12 | End: 2025-05-07 | Stop reason: HOSPADM

## 2025-04-11 RX ORDER — OXYCODONE HYDROCHLORIDE 10 MG/1
10 TABLET ORAL EVERY 4 HOURS PRN
Refills: 0 | Status: DISCONTINUED | OUTPATIENT
Start: 2025-04-11 | End: 2025-05-07 | Stop reason: HOSPADM

## 2025-04-11 RX ORDER — GABAPENTIN 100 MG/1
100 CAPSULE ORAL 3 TIMES WEEKLY
Status: DISCONTINUED | OUTPATIENT
Start: 2025-04-14 | End: 2025-05-07 | Stop reason: HOSPADM

## 2025-04-11 RX ORDER — NIFEDIPINE 30 MG/1
30 TABLET, EXTENDED RELEASE ORAL
Status: DISCONTINUED | OUTPATIENT
Start: 2025-04-12 | End: 2025-05-07 | Stop reason: HOSPADM

## 2025-04-11 RX ORDER — ACETAMINOPHEN 325 MG/1
975 TABLET ORAL EVERY 8 HOURS SCHEDULED
Status: DISCONTINUED | OUTPATIENT
Start: 2025-04-11 | End: 2025-05-07 | Stop reason: HOSPADM

## 2025-04-11 RX ORDER — SEVELAMER HYDROCHLORIDE 800 MG/1
1600 TABLET, FILM COATED ORAL
Status: DISCONTINUED | OUTPATIENT
Start: 2025-04-12 | End: 2025-05-07 | Stop reason: HOSPADM

## 2025-04-11 RX ORDER — ALLOPURINOL 100 MG/1
100 TABLET ORAL DAILY
Status: DISCONTINUED | OUTPATIENT
Start: 2025-04-12 | End: 2025-05-07 | Stop reason: HOSPADM

## 2025-04-11 RX ORDER — OXYCODONE HYDROCHLORIDE 10 MG/1
10 TABLET ORAL EVERY 4 HOURS PRN
Qty: 5 TABLET | Refills: 0 | Status: ON HOLD | OUTPATIENT
Start: 2025-04-11 | End: 2025-04-21

## 2025-04-11 RX ORDER — LIDOCAINE 50 MG/G
1 PATCH TOPICAL DAILY PRN
Status: DISCONTINUED | OUTPATIENT
Start: 2025-04-11 | End: 2025-05-07 | Stop reason: HOSPADM

## 2025-04-11 RX ADMIN — CHLORHEXIDINE GLUCONATE 0.12% ORAL RINSE 15 ML: 1.2 LIQUID ORAL at 09:52

## 2025-04-11 RX ADMIN — SEVELAMER HYDROCHLORIDE 1600 MG: 800 TABLET ORAL at 09:52

## 2025-04-11 RX ADMIN — ATORVASTATIN CALCIUM 80 MG: 80 TABLET, FILM COATED ORAL at 17:04

## 2025-04-11 RX ADMIN — GABAPENTIN 100 MG: 100 CAPSULE ORAL at 09:52

## 2025-04-11 RX ADMIN — APIXABAN 5 MG: 5 TABLET, FILM COATED ORAL at 09:52

## 2025-04-11 RX ADMIN — NIFEDIPINE 30 MG: 30 TABLET, FILM COATED, EXTENDED RELEASE ORAL at 17:05

## 2025-04-11 RX ADMIN — ACETAMINOPHEN 975 MG: 325 TABLET, FILM COATED ORAL at 05:19

## 2025-04-11 RX ADMIN — ALLOPURINOL 100 MG: 100 TABLET ORAL at 09:52

## 2025-04-11 RX ADMIN — SENNOSIDES AND DOCUSATE SODIUM 1 TABLET: 50; 8.6 TABLET ORAL at 22:02

## 2025-04-11 RX ADMIN — METHOCARBAMOL 750 MG: 750 TABLET ORAL at 17:04

## 2025-04-11 RX ADMIN — SEVELAMER HYDROCHLORIDE 1600 MG: 800 TABLET ORAL at 12:15

## 2025-04-11 RX ADMIN — METHOCARBAMOL 750 MG: 750 TABLET ORAL at 12:15

## 2025-04-11 RX ADMIN — SEVELAMER HYDROCHLORIDE 1600 MG: 800 TABLET ORAL at 17:04

## 2025-04-11 RX ADMIN — ACETAMINOPHEN 975 MG: 325 TABLET, FILM COATED ORAL at 14:24

## 2025-04-11 RX ADMIN — METHOCARBAMOL 750 MG: 750 TABLET ORAL at 05:19

## 2025-04-11 RX ADMIN — OXYCODONE HYDROCHLORIDE 10 MG: 10 TABLET ORAL at 14:23

## 2025-04-11 RX ADMIN — ACETAMINOPHEN 975 MG: 325 TABLET, FILM COATED ORAL at 22:02

## 2025-04-11 RX ADMIN — OXYCODONE HYDROCHLORIDE 10 MG: 10 TABLET ORAL at 05:19

## 2025-04-11 NOTE — ARC ADMISSION
Patient appears ARC  appropriate pending medical readiness / continued functional need/  bed availability

## 2025-04-11 NOTE — QUICK NOTE
"Patient reports he has had \"floaters\" in his right eye for the past week which have resolved spontaneously.  He reports this afternoon while watching TV he had a floater, across his right eye in his central vision which has stayed since his arrival for the past few hours.  He describes this as a black spot in the center of his vision.  I personally discussed this finding with Dr. Chaudhry (ophthalmology) who recommends outpatient follow-up in the office on Monday.  Discussed that the patient would still be present at [facility] ARC and Dr. Chaudhry agrees to come see the patient next time he is in the hospital while the patient is still that the acute rehab center.    I discussed this plan with the patient who agrees.   "

## 2025-04-11 NOTE — ASSESSMENT & PLAN NOTE
- CTA CAP 4/2: right retroperitoneal hematoma with areas of active contrast extravasation   - Appreciate IR consult and recommendations  - 4/3 IR embolization of branch of the R deep circumflex iliac artery and R superior gluteal artery   - 4/7 IR embolization of pseudoaneurysm  - 4/4 1 u pRBC  - 4/5 2 u pRBC  - 4/7 1 u pRBC  - Repeat CTA 4/5 indicated residual 5 mm pseudoaneurysm within the left gluteal musculature. - now s/p IR embolization of pseudoaneurysm  - Continue to monitor Hgb  - Multimodal pain control  - 4/9 initiated SQH for DVT ppx - transitioned to Eliquis 4/10, hgb stable this am  - PT and OT as indicated

## 2025-04-11 NOTE — PROGRESS NOTES
Patient:  IVCTORINA HARLEY    MRN:  [id number]    Aidin Request ID:  [id number]    Level of care reserved:  Inpatient Rehab Facility    Partner Reserved:  [facility] - ([place]), CARSON Bledsoe [phone]    Clinical needs requested:    Geography searched:  10 miles around [id number]    Start of Service:    Request sent:  10:15am EDT on 4/11/2025 by Gideon Lund    Partner reserved:  11:37am EDT on 4/11/2025 by Gideon Lund    Choice list shared:  11:37am EDT on 4/11/2025 by Gideon Lund

## 2025-04-11 NOTE — PROGRESS NOTES
Progress Note - Trauma   Name: Naresh Carpio 64 y.o. male I MRN: [id number]  Unit/Bed#: [facility] 618-01 I Date of Admission: 4/3/2025   Date of Service: 4/11/2025 I Hospital Day: 8    Assessment & Plan  Traumatic retroperitoneal hemorrhage  - CTA CAP 4/2: right retroperitoneal hematoma with areas of active contrast extravasation   - Appreciate IR consult and recommendations  - 4/3 IR embolization of branch of the R deep circumflex iliac artery and R superior gluteal artery   - 4/7 IR embolization of pseudoaneurysm  - 4/4 1 u pRBC  - 4/5 2 u pRBC  - 4/7 1 u pRBC  - Repeat CTA 4/5 indicated residual 5 mm pseudoaneurysm within the left gluteal musculature. - now s/p IR embolization of pseudoaneurysm  - Continue to monitor Hgb  - Multimodal pain control  - 4/9 initiated SQH for DVT ppx - will transition to Eliquis today pending hgb stability on AM labs  - PT and OT as indicated  Acute on chronic anemia  Lab Results   Component Value Date    EGFR 12 04/11/2025    EGFR 6 04/10/2025    EGFR 9 04/09/2025    CREATININE 4.63 (H) 04/11/2025    CREATININE 7.50 (H) 04/10/2025    CREATININE 5.53 (H) 04/09/2025     - Chronic anemia due to ESRD, baseline Hgb is 8-10  - Patient currently has acute blood loss anemia due to retroperitoneal hematoma  - s/p 4 units of pRBC during this hospitalization  - Goal is Hgb > 7  - Continue to monitor Hgb and transfuse for Hgb < 7 or symptomatic anemia  - 4/9 start SQH for DVT ppx and check 4/10 AM Labs, If hgb remains stable recommend restarting home Eliquis  Wounds, multiple  - Bilateral foot wounds; R anterior shin wound  - Wound care consult and recommendations appreciated  L2 vertebral fracture (HCC)  - CT CAP 4/2: Fracture of L2 vertebral body extending to the superior endplate; seen on scan from 3/24 as well and was admitted to [facility]  - Has an LSO brace  - PT/OT; OOB and ambulation as able   Paroxysmal atrial fibrillation (HCC)  - Rate controlled  - Home Eliquis 5mg BID and aspirin 81mg  currently held  - S/p Kcentra on presentation 4/2  - SQH started 4/9, hgb stable this am, start Eliquis  Chronic diastolic (congestive) heart failure (HCC)  Wt Readings from Last 3 Encounters:   04/11/25 98.5 kg (217 lb 2.5 oz)   03/24/25 107 kg (234 lb 12.6 oz)   02/27/25 102 kg (224 lb 13.9 oz)     - Continue procardia xl 30mg   - No acute exacerbation  ESRD (end stage renal disease) on dialysis (HCC)  Lab Results   Component Value Date    EGFR 12 04/11/2025    EGFR 6 04/10/2025    EGFR 9 04/09/2025    CREATININE 4.63 (H) 04/11/2025    CREATININE 7.50 (H) 04/10/2025    CREATININE 5.53 (H) 04/09/2025   - Appreciate Nephrology consult and recommendations  - Continue TTS dilaysis via permacath   - Last Dialysis 4/8, due tomorrow  - Sevelamer 1,600mg TID   Primary hypertension  - Continue home procardia     History of prediabetes  Lab Results   Component Value Date    HGBA1C 5.7 (H) 02/09/2025       Recent Labs     04/09/25  1811 04/10/25  0708 04/10/25  1810 04/11/25  0714   POCGLU 115 102 115 93       Blood Sugar Average: Last 72 hrs:  (P) 107.625  - Last A1c 5.7, pre-diabetic.   - Not on home medications  - Not currently requiring SSI. Continue to monitor   Acute pain due to trauma  - Acute pain secondary to traumatic injuries.  - Continue multimodal analgesic regimen.  - Appreciate APS evaluation and recommendations.  - Bowel regimen as long as using opioids.  - Continue to monitor pain and adjust regimen as indicated.    Pseudoaneurysm (HCC)  - Pelvic angiogram demonstrated pseudoaneurysms present at distal branches of the left inferior gluteal artery   - s/p artery embolization with coils and gelfoam with IR on 4/7  - Monitor hgb    Bowel Regimen: Senokot, Miralax  VTE Prophylaxis:VTE covered by:  apixaban, Oral, 5 mg at 04/11/25 0952        Disposition: medically stable for discharge pending placement - patient accepted but facility unable to transport patient to dialysis - patient continues to search for  alternative options Please contact the SecureChat role for the Trauma service with any questions/concerns.    24 Hour Events : Eliquis started with stable hgb  Subjective : patient reports he is frustrated that he will have to pay for transportation to and from dialysis. He reports he is tired.     Objective :  Temp:  [97.2 °F (36.2 °C)-98.4 °F (36.9 °C)] 98.4 °F (36.9 °C)  HR:  [57-68] 62  BP: (122-148)/(42-77) 124/42  Resp:  [14-18] 16  SpO2:  [96 %-100 %] 96 %  O2 Device: None (Room air)    I/O         04/09 0701  04/10 0700 04/10 0701 04/11 0700 04/11 0701  04/12 0700    P.O. 1418 600 180    I.V. (mL/kg)  500 (5)     Total Intake(mL/kg) 1418 (14.2) 1100 (11) 180 (1.8)    Urine (mL/kg/hr) 0 (0) 0 (0)     Other  2986     Total Output 0 2986     Net +1418 -1886 +180                 Lines/Drains/Airways       Active Status       Name Placement date Placement time Site Days    HD Permanent Double Catheter 08/30/23  --  Internal jugular  590                  Gen: No acute distress resting comfortably in bed  Neuro: AAOx3, GCS 15, no focal neurodeficit  HEENT: PERRLA, EOMI, mucous membranes moist  Cards: RRR, S1, S2 without murmur rub or gallop  Pulm: Clear to auscultation bilaterally without wheezes rales or rhonchi  GI: Soft, nontender, nondistended  : Voiding independently  MSK: Extremities nontender without deformity  Skin: Warm, dry, intact           Lab Results: I have reviewed the following results:  Recent Labs     04/10/25  0820 04/11/25  0508   WBC 7.81 7.55   HGB 7.4* 7.4*   HCT 23.7* 25.0*    378   SODIUM 132* 133*   K 4.4 4.4   CL 93* 96   CO2 28 28   BUN 52* 30*   CREATININE 7.50* 4.63*   GLUC 102 91   MG 2.2  --    PHOS 5.3*  --

## 2025-04-11 NOTE — ASSESSMENT & PLAN NOTE
Lab Results   Component Value Date    EGFR 12 04/11/2025    EGFR 6 04/10/2025    EGFR 9 04/09/2025    CREATININE 4.63 (H) 04/11/2025    CREATININE 7.50 (H) 04/10/2025    CREATININE 5.53 (H) 04/09/2025     - Chronic anemia due to ESRD, baseline Hgb is 8-10  - Patient currently has acute blood loss anemia due to retroperitoneal hematoma  - s/p 4 units of pRBC during this hospitalization  - Goal is Hgb > 7  - Continue to monitor Hgb and transfuse for Hgb < 7 or symptomatic anemia

## 2025-04-11 NOTE — ASSESSMENT & PLAN NOTE
Wt Readings from Last 3 Encounters:   04/11/25 98.5 kg (217 lb 2.5 oz)   03/24/25 107 kg (234 lb 12.6 oz)   02/27/25 102 kg (224 lb 13.9 oz)     - Continue procardia xl 30mg   - No acute exacerbation

## 2025-04-11 NOTE — ASSESSMENT & PLAN NOTE
Wt Readings from Last 3 Encounters:   04/11/25 98.5 kg (217 lb 2.5 oz)   03/24/25 107 kg (234 lb 12.6 oz)   02/27/25 102 kg (224 lb 13.9 oz)   Appears euvolemic except for LE pitting edema which appears to be chronic  Renal diet  Monitor intake, output, daily weights  Volume management with HD

## 2025-04-11 NOTE — CASE MANAGEMENT
Case Management Discharge Planning Note    Patient name Naresh Carpio  Location [facility] 618/[facility] 618-01 MRN [id number]  : 1960 Date 2025       Current Admission Date: 4/3/2025  Current Admission Diagnosis:Traumatic retroperitoneal hemorrhage   Patient Active Problem List    Diagnosis Date Noted Date Diagnosed    Pseudoaneurysm (HCC) 2025     Acute pain due to trauma 2025     ESRD on dialysis ([facility]) 2025     Wounds, multiple 2025     Traumatic retroperitoneal hemorrhage 2025     Secondary hyperparathyroidism ([facility]) 2025     At risk for electrolyte imbalance 2025     Multiple open wounds of foot 2025     Disorder of acid-base balance 2025     L2 vertebral fracture ([facility]) 2025     Non-compliance with treatment 2025     Generalized weakness 2025     Gout 2025     Closed fracture of proximal end of left humerus with routine healing 02/10/2025     Left shoulder pain 2025     ESRD (end stage renal disease) on dialysis ([facility]) 2025     Primary hypertension 2025     Anemia in ESRD (end-stage renal disease)  ([facility]) 2025     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, on chronic dialysis ([facility]) 2025     Renal lesion 2024     Chronic wound 2024     Chronic diastolic (congestive) heart failure ([facility]) 2024     Pleural effusion 2024     Elevated troponin 2024     Fall 2024     Melena 2024     Paroxysmal atrial fibrillation ([facility])      ESRD (end stage renal disease) ([facility]) 10/25/2024     Nausea 10/20/2024     Hyponatremia 10/19/2024     Acute on chronic anemia 10/14/2024     Falls 10/08/2024     PAD (peripheral artery disease) ([facility]) 10/08/2024     Closed fracture of right pelvis ([facility]) 2024     Bilateral sacral fracture, closed ([facility]) 2024     Hyperkalemia 2024     Difficulty with speech 2024     History of  amputation of hallux (HCC) 03/18/2024     Facial cellulitis 03/12/2024     Constipation 09/06/2023     Cellulitis of right foot      Polymicrobial bacterial infection 08/24/2023     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 mellitus, with necrosis of bone (HCC)      Acquired deformity of foot, right      Charcot's joint      Subacute osteomyelitis of right foot (HCC)      Open wound of right foot 08/21/2023     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 mellitus, with bone involvement without evidence of necrosis (HCC)      Diabetic ulcer of left midfoot associated with type 2 diabetes mellitus, limited to breakdown of skin (HCC)      Diabetic polyneuropathy associated with type 2 diabetes mellitus (HCC)      Diabetes mellitus type 2 with peripheral artery disease (HCC)      History of amputation of lesser toe of left foot (HCC)      Onychomycosis      Status post fall 08/19/2023     Traumatic rhabdomyolysis (HCC) 08/19/2023     Osteoarthritis of left hip 07/27/2022     Severe Left Orchalgia 07/26/2022     Right shoulder pain 07/26/2022     Left hip pain 07/06/2022     Hemodialysis status ([facility])      Hypervolemia      Diarrhea 01/22/2022     Anemia due to chronic kidney disease, on chronic dialysis (HCC) 01/21/2022     History of prediabetes      Hypertension      History of TIA (transient ischemic attack)      T10 vertebral fracture ([facility])        LOS (days): 8  Geometric Mean LOS (GMLOS) (days): 6.9  Days to GMLOS:-1.5     OBJECTIVE:  Risk of Unplanned Readmission Score: 68.19         Current admission status: Inpatient   Preferred Pharmacy:   [facility] [id number] [facility] [address] [address] [facility] [address] [place] [address]  Phone: [phone] Fax: [phone]    Primary Care Provider: Jeffrey Jackson    Primary Insurance: MEDICARE  Secondary Insurance:     DISCHARGE DETAILS:    CM had moved pt's transport to [facility] via [facility], but has subsequently  cancelled the transport  [facility] is willing to accept. Awaiting potential bed assignment

## 2025-04-11 NOTE — H&P
"H&P - Hospitalist   Name: Naresh Carpio 64 y.o. male I MRN: [id number]  Unit/Bed#: [facility] 964-01 I Date of Admission: 4/11/2025   Date of Service: 4/11/2025 I Hospital Day: 0     Assessment & Plan  Status post fall  Pt was admitted to the trauma service and found to have L2 vertebral body and retroperitoneal hematoma.  He was admitted to the trauma service who administered multiple units of PRBCs and had IR embolization of the distal right deep circumflex iliac artery and distal right superior gluteal artery both of which supply an area of active extravasation and then pseudoaneurysms present at distal branches of the left inferior gluteal artery. The artery was embolized using coils and gelfoam.   He stabilized and was discharged to [facility] for rehabilitation  Monitor hemoglobin with Eliquis use  Consult PMR, PT, OT  Anemia due to chronic kidney disease, on chronic dialysis (HCC)  Monitor hemoglobin while in rehab  Nephrology consulted for ESRD and EBONY  ESRD (end stage renal disease) (HCC)  Continue sevelamer TID with meals  Consult nephrology for inpatient HD while in [facility]  Paroxysmal atrial fibrillation (HCC)  Rate is controlled with procardia  Anticoagulation with Eliquis  Chronic diastolic (congestive) heart failure (HCC)  Wt Readings from Last 3 Encounters:   04/11/25 98.5 kg (217 lb 2.5 oz)   03/24/25 107 kg (234 lb 12.6 oz)   02/27/25 102 kg (224 lb 13.9 oz)   Appears euvolemic except for LE pitting edema which appears to be chronic  Renal diet  Monitor intake, output, daily weights  Volume management with HD    Primary hypertension  BP is acceptable, continue nifedipine  Visual disturbance  Reports a right eye \"floater\" earlier in his hospitalization which has resolved but continues to have blurry central vision.  Ophthalmology consulted for eye examination    History of Present Illness   Naresh Carpio is a 64 y.o. male with a PMH of ESRD who presented to the [facility] due to fall. He " was admitted to the trauma service here when he was found to have an RP hematoma after a fall. He was seen by IR and had the above mentioned embolization done. He stabilized and was discharged to [facility] for rehabilitation.     Review of Systems   All other systems reviewed and are negative.    Historical Information   Past Medical History:   Diagnosis Date    Acute cystitis 10/18/2024    Acute on chronic anemia 01/23/2022    Atrial fibrillation (HCC)     Bradycardia 09/05/2023    Diabetes mellitus (HCC)     ESRD (end stage renal disease) on dialysis (HCC)     Hematuria 10/10/2024    Hematuria 10/10/2024    Hyperkalemia 04/06/2024    Hyperkalemia 04/06/2024    Hyperlipidemia     Hypertension     Left toe amputee (HCC)     TIA (transient ischemic attack)     Toxic metabolic encephalopathy 10/08/2024     Past Surgical History:   Procedure Laterality Date    AMPUTATION Left     left foot resection 10 years ago dr tran    HEMODIALYSIS ADULT  1/18/2025    HEMODIALYSIS ADULT  2/27/2025    IR EMBOLIZATION (SPECIFY VESSEL OR SITE)  4/3/2025    IR EMBOLIZATION (SPECIFY VESSEL OR SITE)  4/7/2025    IR LOWER EXTREMITY ANGIOGRAM  08/29/2023    IR TEMPORARY DIALYSIS CATHETER PLACEMENT  08/25/2023    IR TUNNELED CENTRAL LINE REMOVAL  08/23/2023    IR TUNNELED DIALYSIS CATHETER PLACEMENT  01/27/2022    IR TUNNELED DIALYSIS CATHETER PLACEMENT  08/30/2023    CO AMPUTATION METATARSAL W/TOE SINGLE Right 8/31/2023    Procedure: RIGHT PARTIAL 1ST RAY RESECTION WITH  WOUND VAC APPLICATION;  Surgeon: Won Parmar DPM;  Location: [facility] OR;  Service: Podiatry     Social History     Tobacco Use    Smoking status: Never     Passive exposure: Never    Smokeless tobacco: Never   Vaping Use    Vaping status: Never Used   Substance and Sexual Activity    Alcohol use: Not Currently     Alcohol/week: 0.0 standard drinks of alcohol     Comment: 0    Drug use: Never    Sexual activity: Not on file     E-Cigarette/Vaping    E-Cigarette Use Never  "User      E-Cigarette/Vaping Substances    Nicotine No     THC No     CBD No     Flavoring No     Other No     Unknown No      Family history non-contributory    Objective :  Temp:  [97.6 °F (36.4 °C)-99 °F (37.2 °C)] 98.3 °F (36.8 °C)  HR:  [59-66] 66  BP: (122-164)/(42-92) 164/92  Resp:  [16-18] 18  SpO2:  [96 %-99 %] 99 %  O2 Device: None (Room air)    Wt Readings from Last 1 Encounters:   04/11/25 98.5 kg (217 lb 2.5 oz)     Estimated body mass index is 28.65 kg/m² as calculated from the following:    Height as of 4/3/25: 6' 1\" (1.854 m).    Weight as of an earlier encounter on 4/11/25: 98.5 kg (217 lb 2.5 oz).    Physical Exam  Constitutional:       Appearance: Normal appearance.   HENT:      Head: Normocephalic and atraumatic.      Nose: Nose normal.   Eyes:      General: No scleral icterus.        Right eye: No discharge.         Left eye: No discharge.      Extraocular Movements: Extraocular movements intact.      Conjunctiva/sclera: Conjunctivae normal.      Pupils: Pupils are equal, round, and reactive to light.      Comments: No RR on the right   Cardiovascular:      Rate and Rhythm: Normal rate and regular rhythm.   Pulmonary:      Effort: Pulmonary effort is normal.      Breath sounds: No wheezing or rales.   Abdominal:      Palpations: Abdomen is soft.   Musculoskeletal:      Right lower leg: Edema present.      Left lower leg: Edema present.   Skin:     General: Skin is warm and dry.   Neurological:      Mental Status: He is alert and oriented to person, place, and time.   Psychiatric:         Mood and Affect: Mood normal.         Behavior: Behavior normal.           Lab Results: I have reviewed the following results:  Results from last 7 days   Lab Units 04/11/25  0508 04/10/25  0820 04/09/25  0541   HEMOGLOBIN g/dL 7.4* 7.4* 7.3*   HEMATOCRIT % 25.0* 23.7* 23.5*   WBC Thousand/uL 7.55 7.81 8.98     Results from last 7 days   Lab Units 04/11/25  0508 04/10/25  0820 04/09/25  0541   BUN mg/dL 30* 52* " 34*   SODIUM mmol/L 133* 132* 132*   POTASSIUM mmol/L 4.4 4.4 4.1   CHLORIDE mmol/L 96 93* 94*   CREATININE mg/dL 4.63* 7.50* 5.53*     Results from last 7 days   Lab Units 04/07/25  1126   PROTIME seconds 15.4*   INR  1.20*        Imaging Results Review: No pertinent imaging studies reviewed.  Other Study Results Review: No additional pertinent studies reviewed.    Review of Scheduled Meds:  Current Facility-Administered Medications   Medication Dose Route Frequency Provider Last Rate    acetaminophen  975 mg Oral Q8H KEMAL Cerda MD      [START ON 4/12/2025] allopurinol  100 mg Oral Daily Damian Cerda MD      [START ON 4/12/2025] apixaban  5 mg Oral BID Damian Cerda MD      [START ON 4/12/2025] atorvastatin  80 mg Oral Daily With Dinner Damian Cerda MD      epoetin jessica  3,000 Units Intravenous After Dialysis Damian Cerda MD      And    epoetin jessica  2,000 Units Intravenous After Dialysis Damian Cerda MD      [START ON 4/14/2025] gabapentin  100 mg Oral Once per day on Monday Wednesday Friday Damian Cerda MD      lidocaine  1 patch Topical Daily PRN Damian Cerda MD      [START ON 4/12/2025] methocarbamol  750 mg Oral Q6H [facility] Damian Cerda MD      naloxone  0.04 mg Intravenous Q1MIN PRN Damian Cerda MD      [START ON 4/12/2025] NIFEdipine  30 mg Oral After Dinner Damian Cerda MD      oxyCODONE  5 mg Oral Q4H PRN Damian Cerda MD      Or    oxyCODONE  10 mg Oral Q4H PRN Damian Cerda MD      senna-docusate sodium  1 tablet Oral HS Damian Cerda MD      [START ON 4/12/2025] sevelamer  1,600 mg Oral TID With Meals Damian Cerda MD         Code Status: Level 1 - Full Code    Administrative Statements   I have spent a total time of 45 minutes in caring for this patient on the day of the visit/encounter including Diagnostic results, Instructions for management, Patient and family education, Impressions, Counseling / Coordination of care, Documenting in the medical record, Reviewing/placing  orders in the medical record (including tests, medications, and/or procedures), Obtaining or reviewing history  , and Communicating with other healthcare professionals .  ** Please Note:  voice to text software may have been used in the creation of this document. Although proof errors in transcription or interpretation are a potential of such software**

## 2025-04-11 NOTE — PROGRESS NOTES
PHYSICAL MEDICINE AND REHABILITATION   PREADMISSION ASSESSMENT     Projected IGC and Rehabilitation Diagnoses:  Impairment of mobility, safety and Activities of Daily Living (ADLs) due to Other Disabling Impairments:  13  Other Disabling Impairments  Etiologic: Traumatic retroperitoneal hemorrhage   Date of Onset: 4/3/25     Date of surgery:   4/3/25 IR embolization of branch of the R deep circumflex iliac artery and R superior gluteal artery   4/7/25 IR embolization of pseudoaneurysm    PATIENT INFORMATION  Name: Naresh Carpio Phone #: [phone] (home)   Address: [address] [address]  YOB: 1960 Age: 64 y.o. #   Marital Status:   Ethnicity:    Employment Status: retired  Extended Emergency Contact Information  Primary Emergency Contact: Alicja Carpio  Mobile Phone: [phone]  Relation: Sister  Secondary Emergency Contact: Mercedes Resendez  Mobile Phone: [phone]  Relation: Friend  Advance Directive: Level 1 Full code ( No advance directive on file )     INSURANCE/COVERAGE:     Primary Payor: MEDICARE / Plan: MEDICARE A AND B / Product Type: Medicare A & B Fee for Service /   Secondary Payer: SELF PAY   Payer Contact: n/a  Payer Contact: n/a    Contact Phone: n/a  Contact Phone: n/a         MEDICARE #: [id number]   Medicare Days: 20/30/60    Medical Record #: [id number]    REFERRAL SOURCE:   Referring provider: Randall Mcnally DO  Referring facility: [facility]   Room: [place] [address] [address]  PCP: Jeffrey Jackson PCP phone number: [phone]    MEDICAL INFORMATION  HPI:Naresh is a  63 y/o male with a medical history of but not limited to chronic anemia; DM; ESRD on HD; hyperlipidemia ; HTN ; hyperkalemia ; left toe amputation ; TIA  who initially presented to [facility] on 4/2/25 after falling a few days prior onto a rail but had progressive pain to lower mid back. Of note he was recently  admitted to the hospital after a fall where he was diagnosed with an L2 fracture. He reported multiple recent falls with no dizziness, chest pain, lightheadedness. His falls were described as mechanical. He was found to have a large retroperitoneal hematoma on the right and the fore-mentioned L2 fracture. CTA of the abdomen and pelvis showed extravasation of contrast. He was transferred to [facility] on 4/3/25 for further eval / assessment / consultation. During hospital course / stay he went to IR for embolization of his deep circumflex iliac artery and superior gluteal artery. Nephrology was consulted for hemodialysis management. His hemoglobin trended down and he was transfused with a total of 4 PBRCs during hospital course.  He underwent CTA of the abdomen and pelvis which showed a 5 mm pseudoaneurysm of the inferior gluteal artery branch.  He underwent embolization of the pseudoaneurysm.  He was started on subcu heparin for DVT prophylaxis and his hemoglobin closely monitored with stable hemoglobin send DVT prophylaxis he was transition to his home Eliquis dosing.  His hemoglobin remained stable on Eliquis. He is WBAT to all extremities. Pain has been managed with PO and IV PRN analgesics. PT/OT therapies were consulted and continue to follow patient at this time and are recommending inpatient acute rehab when medically stable. All involved medical disciplines feel/agree patient is medically ready for discharge at this time. Inpatient acute rehabilitation physician was consulted. Upon review of patient's case and correspondence with PT/OT therapy services, [facility] physician feels Naresh will benefit and is a good candidate / appropriate for inpatient acute rehab at this time. He has demonstrated the willingness / desire and tolerance to participate in the required 3 hours or more of therapies per day.       Past Medical History:   Past Surgical History:   Allergies:     Past Medical History:    Diagnosis Date    Acute cystitis 10/18/2024    Acute on chronic anemia 01/23/2022    Atrial fibrillation (HCC)     Bradycardia 09/05/2023    Diabetes mellitus (HCC)     ESRD (end stage renal disease) on dialysis (HCC)     Hematuria 10/10/2024    Hematuria 10/10/2024    Hyperkalemia 04/06/2024    Hyperkalemia 04/06/2024    Hyperlipidemia     Hypertension     Left toe amputee (HCC)     TIA (transient ischemic attack)     Toxic metabolic encephalopathy 10/08/2024    Past Surgical History:   Procedure Laterality Date    AMPUTATION Left     left foot resection 10 years ago dr tran    HEMODIALYSIS ADULT  1/18/2025    HEMODIALYSIS ADULT  2/27/2025    IR EMBOLIZATION (SPECIFY VESSEL OR SITE)  4/3/2025    IR EMBOLIZATION (SPECIFY VESSEL OR SITE)  4/7/2025    IR LOWER EXTREMITY ANGIOGRAM  08/29/2023    IR TEMPORARY DIALYSIS CATHETER PLACEMENT  08/25/2023    IR TUNNELED CENTRAL LINE REMOVAL  08/23/2023    IR TUNNELED DIALYSIS CATHETER PLACEMENT  01/27/2022    IR TUNNELED DIALYSIS CATHETER PLACEMENT  08/30/2023    NM AMPUTATION METATARSAL W/TOE SINGLE Right 8/31/2023    Procedure: RIGHT PARTIAL 1ST RAY RESECTION WITH  WOUND VAC APPLICATION;  Surgeon: Won Parmar DPM;  Location: [facility];  Service: Podiatry     No Known Allergies      Medical/functional conditions requiring inpatient rehabilitation: impaired mobility / self care  ; impaired balance / ambulation     Risk for medical/clinical complications: risk for falls ; risk for anemia ; risk for pain ; risk for skin breakdown ;risk for infection     Comorbidities in the last 100 days:   ESRD (end stage renal disease) on dialysis   Traumatic retroperitoneal hemorrhage   hypertension   Acute on chronic Anemia due to chronic kidney disease   Chronic diastolic (congestive) heart failure   L2 vertebral fracture   Paroxysmal atrial fibrillation   Pseudoaneurysm   CURRENT VITAL SIGNS:   Temp:  [97.6 °F (36.4 °C)-99 °F (37.2 °C)] 99 °F (37.2 °C)  HR:  [57-64] 64  BP:  (122-154)/(42-55) 154/55  Resp:  [14-18] 18  SpO2:  [96 %-100 %] 99 %  O2 Device: None (Room air)   Intake/Output Summary (Last 24 hours) at 4/11/2025 1504  Last data filed at 4/11/2025 0900  Gross per 24 hour   Intake 780 ml   Output 0 ml   Net 780 ml        LABORATORY RESULTS:      Lab Results   Component Value Date    HGB 7.4 (L) 04/11/2025    HCT 25.0 (L) 04/11/2025    WBC 7.55 04/11/2025     Lab Results   Component Value Date    BUN 30 (H) 04/11/2025    K 4.4 04/11/2025    CL 96 04/11/2025    CREATININE 4.63 (H) 04/11/2025     Lab Results   Component Value Date    PROTIME 15.4 (H) 04/07/2025    INR 1.20 (H) 04/07/2025        DIAGNOSTIC STUDIES:  IR embolization (specify vessel or site)  Result Date: 4/9/2025  Impression: Impression: Pelvic angiogram showed pseudoaneurysms present at distal branches of the left inferior gluteal artery. The artery was embolized using coils and gelfoam. Workstation performed: [id number]     CTA abdomen pelvis w wo contrast  Result Date: 4/5/2025  Impression: Grossly unchanged size of a right retroperitoneal and left gluteal muscle hematoma status post IR embolization. Infiltrative blood products now within the mid pelvis are likely due to redistribution. No active extravasation identified. 5 mm pseudoaneurysm within the left gluteal musculature. Recommend IR consultation. Small right pleural effusion, increased from prior. Unchanged appearance of an L2 vertebral body fracture. The study was marked in EPIC for immediate notification. Workstation performed: [id number]     IR embolization (specify vessel or site)  Result Date: 4/3/2025  Impression: Impression: Embolization of the distal right deep circumflex iliac artery and distal right superior gluteal artery both of which supply an area of active extravasation. Workstation performed: [id number]       PRECAUTIONS/SPECIAL NEEDS:  Tobacco:   Social History     Tobacco Use   Smoking Status Never    Passive exposure: Never    Smokeless Tobacco Never   , Alcohol:    Social History     Substance and Sexual Activity   Alcohol Use Not Currently    Alcohol/week: 0.0 standard drinks of alcohol    Comment: 0   , Weight Bearing Precautions:  weight bearing as tolerated, Splints/Braces: LSO back brace , Anticoagulation:   Eliquis @ ordered by MD , Edema Management, Safety Concerns, Pain Management, Dietary Restrictions: Regular diet , Language Preference: English, Hemodialysis Schedule: TUE/THURS/SAT, and Fall precautions    MEDICATIONS:     Current Facility-Administered Medications:     acetaminophen (TYLENOL) tablet 975 mg, 975 mg, Oral, Q8H KEMAL, Nallely Hodge PA-C, 975 mg at 04/11/25 1424    allopurinol (ZYLOPRIM) tablet 100 mg, 100 mg, Oral, Daily, Nallely Maravillaan PA-C, 100 mg at 04/11/25 0952    apixaban (ELIQUIS) tablet 5 mg, 5 mg, Oral, BID, CARSON Brian-C, 5 mg at 04/11/25 0952    atorvastatin (LIPITOR) tablet 80 mg, 80 mg, Oral, Daily With Dinner, CARSON Doyle-C, 80 mg at 04/10/25 1757    chlorhexidine (PERIDEX) 0.12 % oral rinse 15 mL, 15 mL, Mouth/Throat, Q12H KEMAL, Nallely Hodge PA-C, 15 mL at 04/11/25 0952    epoetin jessica (EPOGEN,PROCRIT) injection 3,000 Units, 3,000 Units, Intravenous, After Dialysis, 3,000 Units at 04/10/25 0944 **AND** epoetin jessica (EPOGEN,PROCRIT) injection 2,000 Units, 2,000 Units, Intravenous, After Dialysis, Anthony Mixon, DO, 2,000 Units at 04/10/25 0943    gabapentin (NEURONTIN) capsule 100 mg, 100 mg, Oral, Once per day on Monday Wednesday Friday, Nallely Hodge PA-C, 100 mg at 04/11/25 0952    HYDROmorphone (DILAUDID) injection 0.5 mg, 0.5 mg, Intravenous, Q6H PRN, Kadeem Corona PA-C, 0.5 mg at 04/10/25 0743    lidocaine (LIDODERM) 5 % patch 1 patch, 1 patch, Topical, Daily PRN, Nallely Hodge PA-C, 1 patch at 04/05/25 1146    methocarbamol (ROBAXIN) tablet 750 mg, 750 mg, Oral, Q6H KEMALDaryl MD, 750 mg at 04/11/25 1215    naloxone (NARCAN) 0.04  mg/mL syringe 0.04 mg, 0.04 mg, Intravenous, Q1MIN PRN, CARSON Doyle-KAREEM    NIFEdipine (PROCARDIA XL) 24 hr tablet 30 mg, 30 mg, Oral, After Dinner, GRANT Zuluaga, 30 mg at 04/09/25 1824    oxyCODONE (ROXICODONE) IR tablet 5 mg, 5 mg, Oral, Q3H PRN **OR** oxyCODONE (ROXICODONE) immediate release tablet 10 mg, 10 mg, Oral, Q3H PRN, Daryl Segal MD, 10 mg at 04/11/25 1423    senna-docusate sodium (SENOKOT S) 8.6-50 mg per tablet 1 tablet, 1 tablet, Oral, HS, CARSON Doyle-C, 1 tablet at 04/10/25 2109    sevelamer (RENAGEL) tablet 1,600 mg, 1,600 mg, Oral, TID With Meals, Nallely Hodge PA-C, 1,600 mg at 04/11/25 1215    SKIN INTEGRITY:   Multiple diabetic wounds to left foot ; right tibial area skin tear ; Coccyx PU - tx(s) as ordered by MD    PRIOR LEVEL OF FUNCTION:  He lives in a(n) apartment  Naresh Carpio is not  and lives alone.  Self Care: Independent, Indoor Mobility: Independent, Stairs (in/outdoor): Independent, and Cognition: Independent    FALLS IN THE LAST 6 MONTHS: 1 to 4    HOME ENVIRONMENT:  The living area: can live on one level  There are 6 - 10 steps to enter the home.    The patient will not have 24 hour supervision/physical assistance available upon discharge.    PREVIOUS DME:  Equipment in home (previous DME): None    FUNCTIONAL STATUS:  Physical Therapy Occupational Therapy Speech Therapy   As per PT:     PT Visit Date 04/09/25   Note Type   Note Type Treatment for insurance authorization   Pain Assessment   Pain Assessment Tool 0-10   Pain Score 7   Pain Location/Orientation Location: Abdomen   Hospital Pain Intervention(s) Repositioned;Ambulation/increased activity;Emotional support   Restrictions/Precautions   Braces or Orthoses LSO  (Pt declined donning due to his abdominal pain.)   Other Precautions Chair Alarm;Bed Alarm;Spinal precautions;Pain;Fall Risk   Subjective   Subjective The patient is hopeful to continue to get better, and to be good  enough to go home come Sunday.   Transfers   Sit to Stand 5  Supervision   Stand to Sit 5  Supervision   Ambulation/Elevation   Gait pattern Excessively slow;Inconsistent amira   Gait Assistance 4  Minimal assist  (Contact guard.)   Additional items Assist x 1   Assistive Device Rolling walker   Distance 60 feet.   Balance   Static Sitting Good   Dynamic Sitting Fair +   Static Standing Fair   Dynamic Standing Fair -   Ambulatory Poor +   Activity Tolerance   Activity Tolerance Patient tolerated treatment well;Patient limited by pain   Assessment   Prognosis Good   Problem List Decreased endurance;Impaired balance;Decreased mobility;Pain;Decreased safety awareness;Impaired judgement   Assessment The patient is demonstrating improvement in his mobility today as he was able to attain household distances with increased time and use of the rolling walker. He was expressive about his situation, but after empathetic listening and explaining that his goals of returning home can be significantly improved with progressive mobility. He was limited due to pain, but he was able to transfer several times without assistance. Contact guard was provided with ambulation. Educated him about the importance of continued mobility, and expressed that he can utilize the callbell to ask to go for several walks a day ideally. Also discussed with him about receiving therapy prior to dialysis on those days. He was amenable to this idea. He remains limited from his baseline, and he will benefit from continued therapies in order to facilitate his recovery to independence. Emphasized that his recovery will be faster with ambulating with staff several times a day, and to reach out via the call bell to achieve this.    As per OT:     OT Visit Date 04/09/25   Note Type   Note Type Treatment   Cancel Reasons Medical status   Pain Assessment   Pain Assessment Tool FLACC   Pain Rating: FLACC (Rest) - Face 0   Pain Rating: FLACC (Rest) - Legs 0    Pain Rating: FLACC (Rest) - Activity 0   Pain Rating: FLACC (Rest) - Cry 0   Pain Rating: FLACC (Rest) - Consolability 0   Score: FLACC (Rest) 0   Pain Rating: FLACC (Activity) - Face 0   Pain Rating: FLACC (Activity) - Legs 0   Pain Rating: FLACC (Activity) - Activity 0   Pain Rating: FLACC (Activity) - Cry 0   Pain Rating: FLACC (Activity) - Consolability 0   Score: FLACC (Activity) 0   Restrictions/Precautions   Braces or Orthoses LSO  (pt refusing lso for short distance mobility/ standing for pant over hips)   Other Precautions Chair Alarm;Fall Risk;Pain   ADL   Where Assessed Chair   UB Dressing Assistance 5  Supervision/Setup   LB Dressing Assistance 3  Moderate Assistance   LB Dressing Deficit    (min asst pant donning and max asst socks)   Toileting Assistance  4  Minimal Assistance   Functional Standing Tolerance   Time f balance with rw, some unsteadiness but not lob   Bed Mobility   Additional Comments pt sleeps in a chair at home and has been doing same here [facility]   Transfers   Sit to Stand 5  Supervision   Stand to Sit 5  Supervision   Functional Mobility   Functional Mobility 4  Minimal assistance   Additional Comments rw   Cognition   Arousal/Participation Alert   Attention Attends with cues to redirect   Memory Decreased recall of precautions   Following Commands Follows multistep commands with increased time or repetition   Comments pt very talkative, cooperative this date c minimal encouragement. pt at this time wishes to go home not to in pt rehab. pt is at risk for falls.   Activity Tolerance   Activity Tolerance Patient tolerated treatment well   Assessment   Assessment pt participated in am ot session and was seen focusing  on adls seated in chair, clothing management pulling pants over hips. pts balance is fair  with rw  his distance is limited activity tolerence is fair plus. pt requires min asst pant donning max asst sock shawna doff. pt with improvement from past session. feel at this  time pt requires in pt rehab inorder to return home independently.  pt wishes at this time for hopeful return home from reported bad experience at past rehab. will follow focusing on goals from ie.  pt is s/p embolizations since i.e. otr/l aware.        N/a      CARE SCORES:  Self Care:  Eatin: Setup or clean-up assistance  Oral hygiene: 04: Supervision or touching  assistance  Toilet hygiene: 04: Supervision or touching  assistance  Shower/bathing self: 03: Partial/moderate assistance  Upper body dressin: Supervision or touching  assistance  Lower body dressin: Partial/moderate assistance  Putting on/taking off footwear: 03: Partial/moderate assistance  Transfers:  Roll left and right: 04: Supervision or touching  assistance  Sit to lyin: Supervision or touching  assistance  Lying to sitting on side of bed: 04: Supervision or touching  assistance  Sit to stand: 04: Supervision or touching  assistance  Chair/bed to chair transfer: 03: Partial/moderate assistance  Toilet transfer: 03: Partial/moderate assistance  Mobility:  Walk 10 ft: 03: Partial/moderate assistance  Walk 50 ft with two turns: 03: Partial/moderate assistance  Walk 150ft: 88: Not attempted due to medical conditions or safety concerns    CURRENT GAP IN FUNCTION  Prior to Admission: Functional Status: Patient was independent with mobility/ambulation, transfers, ADL's, IADL's.    Expected functional outcomes: It is expected that with skilled acute rehabilitation services the patient will progress to Independent for self care and Independent for mobility     Estimated length of stay: 10 to 14 days    Anticipated Post-Discharge Disposition/Treatment  Disposition: Return to previous home/apartment.  Outpatient Services: Physical Therapy (PT) and Occupational Therapy (OT)    BARRIERS TO DISCHARGE  Weakness, Pain, Balance Difficulty, Fatigue, Caregiver Accessibility, Equipment Needs, and Lives Alone    INTERVENTIONS FOR  DISCHARGE  Adaptive equipment, Patient/Family/Caregiver Education, Arrange DME needs, Medication Changes per MD, Therapy exercises, and Energy conservation education     REQUIRED THERAPY:  Patient will require PT and OT 90 minutes each per day, five days per week to achieve rehab goals.     REQUIRED FUNCTIONAL AND MEDICAL MANAGEMENT FOR INPATIENT REHABILITATION:  Pain Management: Overall pain is well controlled, Deep Vein Thrombosis (DVT) Prophylaxis:  SCD's while in bed and   Nursing education and bowel/bladder management, internal medicine to manage/monitor medical conditions, PM&R to maximize function and provide medical oversight, PT/OT intervention, patient/family education/training, and any consults as needed     RECOMMENDED LEVEL OF CARE:   Naresh is a  65 y/o male with a medical history of but not limited to chronic anemia; DM; ESRD on HD; hyperlipidemia ; HTN ; hyperkalemia ; left toe amputation ; TIA  who initially presented to [facility] on 4/2/25 after falling a few days prior onto a rail but had progressive pain to lower mid back. Of note he was recently admitted to the hospital after a fall where he was diagnosed with an L2 fracture. He reported multiple recent falls with no dizziness, chest pain, lightheadedness. His falls were described as mechanical. He was found to have a large retroperitoneal hematoma on the right and the fore-mentioned L2 fracture. CTA of the abdomen and pelvis showed extravasation of contrast. He was transferred to [facility] on 4/3/25 for further eval / assessment / consultation. During hospital course / stay he went to IR for embolization of his deep circumflex iliac artery and superior gluteal artery. Nephrology was consulted for hemodialysis management. His hemoglobin trended down and he was transfused with a total of 4 PBRCs during hospital course.  He underwent CTA of the abdomen and pelvis which showed a 5 mm pseudoaneurysm of the inferior  gluteal artery branch.  He underwent embolization of the pseudoaneurysm.  He was started on subcu heparin for DVT prophylaxis and his hemoglobin closely monitored with stable hemoglobin send DVT prophylaxis he was transition to his home Eliquis dosing.  His hemoglobin remained stable on Eliquis. He is WBAT to all extremities. Pain has been managed with PO and IV PRN analgesics. PT/OT therapies were consulted and continue to follow patient at this time and are recommending inpatient acute rehab when medically stable. All involved medical disciplines feel/agree patient is medically ready for discharge at this time. Inpatient acute rehabilitation physician was consulted. Upon review of patient's case and correspondence with PT/OT therapy services, [facility] physician feels Naresh will benefit and is a good candidate / appropriate for inpatient acute rehab at this time. He has demonstrated the willingness / desire and tolerance to participate in the required 3 hours or more of therapies per day. Prior to admission / hospital stay Naresh was independent with all ADLs /functional mobility / iADLs. Currently with PT therapies /  OT therapies : supervision for upper body ADLs ; supervision - min A with lower body ADLs. Nursing is being recommended for medication distribution / management , bowel / bladder management and educational purposes , internal medicine to continue to monitor and manage medical conditions ,PM&R to maximize  function and provide medical oversight, and inpatient rehab to maximize self care ,mobility ,strength , and endurance upon discharge to home.

## 2025-04-11 NOTE — ASSESSMENT & PLAN NOTE
Pt was admitted to the trauma service and found to have L2 vertebral body and retroperitoneal hematoma.  He was admitted to the trauma service who administered multiple units of PRBCs and had IR embolization of the distal right deep circumflex iliac artery and distal right superior gluteal artery both of which supply an area of active extravasation and then pseudoaneurysms present at distal branches of the left inferior gluteal artery. The artery was embolized using coils and gelfoam.   He stabilized and was discharged to [facility] for rehabilitation  Monitor hemoglobin with Eliquis use  Consult PMR, PT, OT

## 2025-04-11 NOTE — PROGRESS NOTES
Progress Note - Nephrology   Name: Naresh Carpio 64 y.o. male I MRN: [id number]  Unit/Bed#: [facility] 618-01 I Date of Admission: 4/3/2025   Date of Service: 4/11/2025 I Hospital Day: 8     Assessment & Plan  ESRD (end stage renal disease) on dialysis (HCC)  TTS at [facility] [place]  Access: Right IJ permcath  EDW: 99 kg per previous hospital records  Continue with current dialysis schedule  Due for HD Saturday 4/12/25   Pt approved for [facility] per [facility] admissions. Likely to be transferred later today to unit  Traumatic retroperitoneal hemorrhage  History of a fall onto a rail a few days ago. Continued to have progressive pain  Presented to [facility] ED, found to have retroperitoneal hemorrhage with areas of active contrast extravasation. Transferred to [facility]  Required 1u PRBC 4/7  Management per primary service  Primary hypertension  BP remaining stable  Home regimen: Nifedipine 30 mg daily   Monitor blood pressures  Anemia due to chronic kidney disease, on chronic dialysis ([facility])  Start SURINDER therapy 5000 units q. HD  Current hemoglobin 7.4   s/p 1u PRBC 4/7  At risk for electrolyte imbalance  Recent potassium 4.1, calcium 8.9   HD TTS  Chronic diastolic (congestive) heart failure (HCC)  Echo Oct. 2024 with LVEF 60-65%  Monitor volume status   UF with HD  Acute on chronic anemia  Home regimen: Renagel 800 mg TID per chart review  Most recent Phosphorus 4.3  HD TTS      Review of Systems  Patient seen and examined while out of bed.  Patient expressing frustration and concerns about discharge and where he will end up for rehab.  Overall denies any shortness of breath and chest pain.     Review of Systems   Constitutional:  Negative for fever.   Respiratory:  Negative for cough and shortness of breath.    Cardiovascular:  Negative for chest pain and palpitations.   Gastrointestinal:  Negative for abdominal pain, constipation, diarrhea, nausea and vomiting.   Skin:  Negative for color change.   Neurological:  Negative for dizziness,  light-headedness and headaches.         Physical Exam:  Current Weight: Weight - Scale: 98.5 kg (217 lb 2.5 oz)  Vitals:    04/10/25 2213 04/11/25 0241 04/11/25 0706 04/11/25 1106   BP: (!) 122/47 (!) 124/48 (!) 124/42 (!) 126/44   BP Location:       Pulse: 59 61 62    Resp:  17 16 16   Temp: 97.6 °F (36.4 °C)  98.4 °F (36.9 °C) 99 °F (37.2 °C)   TempSrc: Oral      SpO2: 97% 97% 96%    Weight:   98.5 kg (217 lb 2.5 oz)    Height:           Physical Exam  Vitals and nursing note reviewed.   Constitutional:       General: He is not in acute distress.     Appearance: He is ill-appearing.   Cardiovascular:      Rate and Rhythm: Normal rate.      Pulses: Normal pulses.      Heart sounds: No murmur heard.     No gallop.   Pulmonary:      Effort: Pulmonary effort is normal. No respiratory distress.      Breath sounds: Normal breath sounds. No wheezing or rales.   Abdominal:      General: Bowel sounds are normal. There is no distension.      Palpations: Abdomen is soft.      Tenderness: There is no abdominal tenderness.   Musculoskeletal:      Right lower leg: No edema.      Left lower leg: No edema.   Skin:     General: Skin is warm and dry.      Capillary Refill: Capillary refill takes less than 2 seconds.   Neurological:      Mental Status: He is alert and oriented to person, place, and time. Mental status is at baseline.   Psychiatric:         Mood and Affect: Mood normal.         Behavior: Behavior normal.        Medications:    Current Facility-Administered Medications:     acetaminophen (TYLENOL) tablet 975 mg, 975 mg, Oral, Q8H KEMAL, Nallely Hodge PA-C, 975 mg at 04/11/25 0519    allopurinol (ZYLOPRIM) tablet 100 mg, 100 mg, Oral, Daily, Nallely Hodge PA-C, 100 mg at 04/11/25 0952    apixaban (ELIQUIS) tablet 5 mg, 5 mg, Oral, BID, KATHRYN BrianC, 5 mg at 04/11/25 0952    atorvastatin (LIPITOR) tablet 80 mg, 80 mg, Oral, Daily With Dinner, CARSON Doyle-C, 80 mg at 04/10/25 9779     chlorhexidine (PERIDEX) 0.12 % oral rinse 15 mL, 15 mL, Mouth/Throat, Q12H KEMAL, Nallely Hodge PA-C, 15 mL at 04/11/25 0952    epoetin jessica (EPOGEN,PROCRIT) injection 3,000 Units, 3,000 Units, Intravenous, After Dialysis, 3,000 Units at 04/10/25 0944 **AND** epoetin jessica (EPOGEN,PROCRIT) injection 2,000 Units, 2,000 Units, Intravenous, After Dialysis, Anthony Mixon DO, 2,000 Units at 04/10/25 0943    gabapentin (NEURONTIN) capsule 100 mg, 100 mg, Oral, Once per day on Monday Wednesday Friday, Nallely Hodge PA-C, 100 mg at 04/11/25 0952    HYDROmorphone (DILAUDID) injection 0.5 mg, 0.5 mg, Intravenous, Q6H PRN, Kadeem Corona PA-C, 0.5 mg at 04/10/25 0743    lidocaine (LIDODERM) 5 % patch 1 patch, 1 patch, Topical, Daily PRN, Nallely Hodge PA-C, 1 patch at 04/05/25 1146    methocarbamol (ROBAXIN) tablet 750 mg, 750 mg, Oral, Q6H KEMAL, Daryl Segal MD, 750 mg at 04/11/25 0519    naloxone (NARCAN) 0.04 mg/mL syringe 0.04 mg, 0.04 mg, Intravenous, Q1MIN PRN, Nallely Hodge PA-C    NIFEdipine (PROCARDIA XL) 24 hr tablet 30 mg, 30 mg, Oral, After Dinner, GRANT Zuluaga, 30 mg at 04/09/25 1824    oxyCODONE (ROXICODONE) IR tablet 5 mg, 5 mg, Oral, Q3H PRN **OR** oxyCODONE (ROXICODONE) immediate release tablet 10 mg, 10 mg, Oral, Q3H PRN, Daryl Segal MD, 10 mg at 04/11/25 0519    senna-docusate sodium (SENOKOT S) 8.6-50 mg per tablet 1 tablet, 1 tablet, Oral, HS, CARSON Doyle-C, 1 tablet at 04/10/25 2109    sevelamer (RENAGEL) tablet 1,600 mg, 1,600 mg, Oral, TID With Meals, CARSON Doyle-C, 1,600 mg at 04/11/25 0952    Laboratory Results:  Results from last 7 days   Lab Units 04/11/25  0508 04/10/25  0820 04/09/25  0541 04/08/25  1414 04/08/25  0801 04/07/25  1602 04/07/25  0602 04/06/25  0910 04/05/25  1410 04/05/25  0840   WBC Thousand/uL 7.55 7.81 8.98  --  11.97*  --  11.37* 11.08*  --   --    HEMOGLOBIN g/dL 7.4* 7.4* 7.3* 7.0* 7.0* 7.0* 6.5* 7.2*    "< > 7.2*   HEMATOCRIT % 25.0* 23.7* 23.5* 22.7* 21.6* 21.4* 21.4* 22.5*   < > 22.0*   PLATELETS Thousands/uL 378 349 327  --  292  --  312 282  --   --    SODIUM mmol/L 133* 132* 132*  --  129*  --  129* 131*  --  130*   POTASSIUM mmol/L 4.4 4.4 4.1  --  5.6*  --  5.0 4.6  --  4.5   CHLORIDE mmol/L 96 93* 94*  --  93*  --  93* 93*  --  91*   CO2 mmol/L 28 28 26  --  23  --  25 28  --  28   BUN mg/dL 30* 52* 34*  --  62*  --  49* 39*  --  47*   CREATININE mg/dL 4.63* 7.50* 5.53*  --  8.57*  --  7.10* 5.82*  --  6.42*   CALCIUM mg/dL 9.1 9.0 8.9  --  8.9  --  8.9 9.1  --  9.0   MAGNESIUM mg/dL  --  2.2 2.2  --   --   --   --   --   --   --    PHOSPHORUS mg/dL  --  5.3* 4.3*  --   --   --   --   --   --   --     < > = values in this interval not displayed.       Medical records have been reviewed through [facility] and care everywhere for this patient encounter    Portions of the record may have been created with voice recognition software. Occasional wrong word or \"sound a like\" substitutions may have occurred due to the inherent limitations of voice recognition software. Read the chart carefully and recognize,   "

## 2025-04-11 NOTE — ASSESSMENT & PLAN NOTE
- Rate controlled  - Home Eliquis 5mg BID and aspirin 81mg currently held  - S/p Kcentra on presentation 4/2  - Home Eliquis restarted 4/10

## 2025-04-11 NOTE — DISCHARGE SUMMARY
Discharge Summary - Trauma   Name: Naresh Carpio 64 y.o. male I MRN: [id number]  Unit/Bed#: [facility] 618-01 I Date of Admission: 4/3/2025   Date of Service: 4/11    Assessment & Plan  Traumatic retroperitoneal hemorrhage  - CTA CAP 4/2: right retroperitoneal hematoma with areas of active contrast extravasation   - Appreciate IR consult and recommendations  - 4/3 IR embolization of branch of the R deep circumflex iliac artery and R superior gluteal artery   - 4/7 IR embolization of pseudoaneurysm  - 4/4 1 u pRBC  - 4/5 2 u pRBC  - 4/7 1 u pRBC  - Repeat CTA 4/5 indicated residual 5 mm pseudoaneurysm within the left gluteal musculature. - now s/p IR embolization of pseudoaneurysm  - Continue to monitor Hgb  - Multimodal pain control  - 4/9 initiated SQH for DVT ppx - transitioned to Eliquis 4/10, hgb stable this am  - PT and OT as indicated  Acute on chronic anemia  Lab Results   Component Value Date    EGFR 12 04/11/2025    EGFR 6 04/10/2025    EGFR 9 04/09/2025    CREATININE 4.63 (H) 04/11/2025    CREATININE 7.50 (H) 04/10/2025    CREATININE 5.53 (H) 04/09/2025     - Chronic anemia due to ESRD, baseline Hgb is 8-10  - Patient currently has acute blood loss anemia due to retroperitoneal hematoma  - s/p 4 units of pRBC during this hospitalization  - Goal is Hgb > 7  - Continue to monitor Hgb and transfuse for Hgb < 7 or symptomatic anemia    Wounds, multiple  - Bilateral foot wounds; R anterior shin wound  - Wound care consult and recommendations appreciated  L2 vertebral fracture (HCC)  - CT CAP 4/2: Fracture of L2 vertebral body extending to the superior endplate; seen on scan from 3/24 as well and was admitted to  [facility]  - Has an LSO brace  - PT/OT; OOB and ambulation as able   Paroxysmal atrial fibrillation (HCC)  - Rate controlled  - Home Eliquis 5mg BID and aspirin 81mg currently held  - S/p Kcentra on presentation 4/2  - Home Eliquis restarted 4/10  Chronic diastolic (congestive) heart failure (HCC)  Wt  "Readings from Last 3 Encounters:   04/11/25 98.5 kg (217 lb 2.5 oz)   03/24/25 107 kg (234 lb 12.6 oz)   02/27/25 102 kg (224 lb 13.9 oz)     - Continue procardia xl 30mg   - No acute exacerbation  ESRD (end stage renal disease) on dialysis (HCC)  Lab Results   Component Value Date    EGFR 12 04/11/2025    EGFR 6 04/10/2025    EGFR 9 04/09/2025    CREATININE 4.63 (H) 04/11/2025    CREATININE 7.50 (H) 04/10/2025    CREATININE 5.53 (H) 04/09/2025   - Appreciate Nephrology consult and recommendations  - Continue TTS dilaysis via permacath   - Last Dialysis 4/8, due tomorrow  - Sevelamer 1,600mg TID   Primary hypertension  - Continue home procardia     History of prediabetes  Lab Results   Component Value Date    HGBA1C 5.7 (H) 02/09/2025       Recent Labs     04/09/25  1811 04/10/25  0708 04/10/25  1810 04/11/25  0714   POCGLU 115 102 115 93       Blood Sugar Average: Last 72 hrs:  (P) 107.625  - Last A1c 5.7, pre-diabetic.   - Not on home medications  - Not currently requiring SSI. Continue to monitor   Acute pain due to trauma  - Acute pain secondary to traumatic injuries.  - Continue multimodal analgesic regimen.  - Appreciate APS evaluation and recommendations.  - Bowel regimen as long as using opioids.  - Continue to monitor pain and adjust regimen as indicated.    Pseudoaneurysm (HCC)  - Pelvic angiogram demonstrated pseudoaneurysms present at distal branches of the left inferior gluteal artery   - s/p artery embolization with coils and gelfoam with IR on 4/7  - Monitor hgb    Admission Date: 4/3/2025 0135  Discharge Date: 04/11/25  Admitting Diagnosis: Hematoma [T14.8XXA]  Discharge Diagnosis:   Medical Problems       Resolved Problems  Date Reviewed: 3/29/2025   None         HPI: From H&P by Dr. Laguna on 4/3: \"Naresh Carpio is a 64 y.o. who presents from the [facility] for a large retroperitoneal hematoma.  He presented after falling a few days ago onto a rail but had progressive pain.  He has missed " "his last dialysis session.  He complains of back pain.  He was recently admitted to the hospital after a fall where he was diagnosed with an L2 fracture. Patient states he does not have preceding symptoms to his multiple recent falls including dizziness, chest pain, lightheadedness. His falls are described as mechanical. He sometimes ambulates with a walker but not consistently. \"    Procedures Performed: No orders of the defined types were placed in this encounter.      Summary of Hospital Course: Patient is a 64-year-old male who presents from [facility] after a fall several days prior to arrival with progressive back pain.  He was found to have a large retroperitoneal hematoma on the right and L2 fracture.  And CTA of the abdomen and pelvis showed extravasation of contrast.  He went to the IR suite for embolization of his deep circumflex iliac artery and superior gluteal artery.  He was monitored in the ICU and his Eliquis was reversed with Kcentra.  Nephrology was consulted for hemodialysis management.  The patient's hemoglobin continued to drop and he was transfused an additional 1 unit of packed red cells.  He underwent CTA of the abdomen and pelvis which showed a 5 mm pseudoaneurysm of the inferior gluteal artery branch.  He underwent embolization of the pseudoaneurysm.  He was started on subcu heparin for DVT prophylaxis and his hemoglobin closely monitored with stable hemoglobin send DVT prophylaxis he was transition to his home Eliquis dosing.  His hemoglobin remained stable on Eliquis and on 4/11 he was medically stable for discharge to SNF.  All questions were answered and incidental findings reviewed at the time of discharge.    Significant Findings, Care, Treatment and Services Provided: Large right-sided retroperitoneal hematoma with active extravasation of contrast status post IR embolization with subsequent 5 mm inferior gluteal artery branch pseudoaneurysm status post " "embolization.    Complications: Right inferior gluteal artery branch pseudoaneurysm s/p embolization     Discharge Day Visit / Exam:   BP (!) 124/42   Pulse 62   Temp 98.4 °F (36.9 °C)   Resp 16   Ht 6' 1\" (1.854 m)   Wt 98.5 kg (217 lb 2.5 oz)   SpO2 96%   BMI 28.65 kg/m²   Gen: No acute distress resting comfortably in bed  Neuro: AAOx3, GCS 15, no focal neurodeficit  HEENT: PERRLA, EOMI, mucous membranes moist  Cards: RRR, S1, S2 without murmur rub or gallop  Pulm: Clear to auscultation bilaterally without wheezes rales or rhonchi  GI: Soft, nontender, nondistended  : Voiding independently  MSK: Extremities nontender without deformity  Skin: Warm, dry, intact    Condition at Discharge: good       Discharge instructions/Information to patient and family:   See After Visit Summary (AVS) for information provided to patient and family.      Provisions for Follow-Up Care:  See after visit summary for information related to follow-up care and any pertinent home health orders.      PCP: Jeffrey Jackson    Disposition: Short-term rehab at [facility]    Planned Readmission: No     Discharge Medications:  See after visit summary for reconciled discharge medications provided to patient and family.      "

## 2025-04-11 NOTE — ASSESSMENT & PLAN NOTE
Lab Results   Component Value Date    EGFR 12 04/11/2025    EGFR 6 04/10/2025    EGFR 9 04/09/2025    CREATININE 4.63 (H) 04/11/2025    CREATININE 7.50 (H) 04/10/2025    CREATININE 5.53 (H) 04/09/2025   - [facility] Nephrology consult and recommendations  - Continue TTS dilaysis via permacath   - Last Dialysis 4/8, due tomorrow  - Sevelamer 1,600mg TID

## 2025-04-11 NOTE — ASSESSMENT & PLAN NOTE
"Reports a right eye \"floater\" earlier in his hospitalization which has resolved but continues to have blurry central vision.  Ophthalmology consulted for eye examination  "

## 2025-04-11 NOTE — DISCHARGE INSTR - AVS FIRST PAGE
Trauma Discharge Instructions:    Please follow-up as instructed. If you need a follow-up appointment, please call the office when you leave to schedule an appointment.    Activity:  - PT and OT evaluation and treatment as indicated.  - You may resume activity as tolerated.  - Walking and normal light activities are encouraged.  - Normal daily activities including climbing steps are okay.  - No driving until no longer using pain medications.    Diet:    - You may resume your normal diet.    Medications:  - You should continue your current medication regimen after discharge unless otherwise instructed. Please refer to your discharge medication list for further details.  - Please take the pain medications as directed.  - You are encouraged to use non-narcotic pain medications first and whenever possible. Reserve the use of narcotic pain medication for moderate to severe pain not controlled by non-narcotic medications.  - No driving while taking narcotic pain medications.  - You may become constipated, especially if taking pain medications. You may take any over the counter stool softeners or laxatives as needed. Examples: Milk of Magnesia, Colace, Senna.    Additional Instructions:  - May shower daily.  - If you have any questions or concerns after discharge please call the office.  - Call office or return to ER if fever greater than 101, chills, persistent nausea/vomiting, worsening/uncontrollable pain, develop productive cough, increasing shortness of breath, difficulty breathing, and/or increasing redness or purulent/foul smelling drainage from incision(s).

## 2025-04-11 NOTE — ARC ADMISSION
ARC  met with patient at bedside. Reviewed ARC program, acute rehab criteria and approval process, medicare benefit/days (20/30/60)/criteria, as well as ARC locations and preferences. Patient's preferred ARC location is [facility].  ARC Rehab folder left with patient and all questions answered. Patient was made aware that ARC Reviewer will keep their  updated regarding referral status. Pt educated on estimated LOS of 7-10 days and no ability to do long term care in this level of care. He is agreeable to the same, with goal to be to d/c back to his home in the community with support of his sister Alicja who lives just a few doors down.

## 2025-04-11 NOTE — ASSESSMENT & PLAN NOTE
Lab Results   Component Value Date    HGBA1C 5.7 (H) 02/09/2025       Recent Labs     04/09/25  1811 04/10/25  0708 04/10/25  1810 04/11/25  0714   POCGLU 115 102 115 93       Blood Sugar Average: Last 72 hrs:  (P) 107.625  - Last A1c 5.7, pre-diabetic.   - Not on home medications  - Not currently requiring SSI. Continue to monitor

## 2025-04-11 NOTE — CASE MANAGEMENT
Case Management Discharge Planning Note    Patient name Naresh Carpio  Location [facility] 618/[facility] 618-01 MRN [id number]  : 1960 Date 2025       Current Admission Date: 4/3/2025  Current Admission Diagnosis:Traumatic retroperitoneal hemorrhage   Patient Active Problem List    Diagnosis Date Noted Date Diagnosed    Pseudoaneurysm (HCC) 2025     Acute pain due to trauma 2025     ESRD on dialysis ([facility]) 2025     Wounds, multiple 2025     Traumatic retroperitoneal hemorrhage 2025     Secondary hyperparathyroidism ([facility]) 2025     At risk for electrolyte imbalance 2025     Multiple open wounds of foot 2025     Disorder of acid-base balance 2025     L2 vertebral fracture ([facility]) 2025     Non-compliance with treatment 2025     Generalized weakness 2025     Gout 2025     Closed fracture of proximal end of left humerus with routine healing 02/10/2025     Left shoulder pain 2025     ESRD (end stage renal disease) on dialysis ([facility]) 2025     Primary hypertension 2025     Anemia in ESRD (end-stage renal disease)  ([facility]) 2025     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, on chronic dialysis ([facility]) 2025     Renal lesion 2024     Chronic wound 2024     Chronic diastolic (congestive) heart failure ([facility]) 2024     Pleural effusion 2024     Elevated troponin 2024     Fall 2024     Melena 2024     Paroxysmal atrial fibrillation ([facility])      ESRD (end stage renal disease) ([facility]) 10/25/2024     Nausea 10/20/2024     Hyponatremia 10/19/2024     Acute on chronic anemia 10/14/2024     Falls 10/08/2024     PAD (peripheral artery disease) ([facility]) 10/08/2024     Closed fracture of right pelvis ([facility]) 2024     Bilateral sacral fracture, closed ([facility]) 2024     Hyperkalemia 2024     Difficulty with speech 2024     History of  amputation of hallux (HCC) 03/18/2024     Facial cellulitis 03/12/2024     Constipation 09/06/2023     Cellulitis of right foot      Polymicrobial bacterial infection 08/24/2023     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 mellitus, with necrosis of bone (HCC)      Acquired deformity of foot, right      Charcot's joint      Subacute osteomyelitis of right foot (HCC)      Open wound of right foot 08/21/2023     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 mellitus, with bone involvement without evidence of necrosis (HCC)      Diabetic ulcer of left midfoot associated with type 2 diabetes mellitus, limited to breakdown of skin (HCC)      Diabetic polyneuropathy associated with type 2 diabetes mellitus (HCC)      Diabetes mellitus type 2 with peripheral artery disease (HCC)      History of amputation of lesser toe of left foot ([facility])      Onychomycosis      Status post fall 08/19/2023     Traumatic rhabdomyolysis ([facility]) 08/19/2023     Osteoarthritis of left hip 07/27/2022     Severe Left Orchalgia 07/26/2022     Right shoulder pain 07/26/2022     Left hip pain 07/06/2022     Hemodialysis status ([facility])      Hypervolemia      Diarrhea 01/22/2022     Anemia due to chronic kidney disease, on chronic dialysis (HCC) 01/21/2022     History of prediabetes      Hypertension      History of TIA (transient ischemic attack)      T10 vertebral fracture ([facility])        LOS (days): 8  Geometric Mean LOS (GMLOS) (days): 6.9  Days to GMLOS:-1.7     OBJECTIVE:  Risk of Unplanned Readmission Score: 68.04         Current admission status: Inpatient   Preferred Pharmacy:   [facility] [address] [place] [address] [address] [place] [address]  [address] [address]  Phone: [phone] Fax: [phone]    Primary Care Provider: Jeffrey Jackson    Primary Insurance: MEDICARE  Secondary Insurance:     DISCHARGE DETAILS:    Pt will d/c to [facility]  Pt will go into room rm# [id number] / report  #[phone] / 1730 transfer    Pt and his nurse aware  Cm attempted to leave a voicemail for the pt's sister but her voicemail box was full

## 2025-04-11 NOTE — ASSESSMENT & PLAN NOTE
Lab Results   Component Value Date    EGFR 12 04/11/2025    EGFR 6 04/10/2025    EGFR 9 04/09/2025    CREATININE 4.63 (H) 04/11/2025    CREATININE 7.50 (H) 04/10/2025    CREATININE 5.53 (H) 04/09/2025   - [facility] Nephrology consult and recommendations  - Continue TTS dilaysis via permacath   - Last Dialysis 4/8, due tomorrow  - Sevelamer 1,600mg TID

## 2025-04-11 NOTE — PLAN OF CARE
Problem: PAIN - ADULT  Goal: Verbalizes/displays adequate comfort level or baseline comfort level  Description: Interventions:- Encourage patient to monitor pain and request assistance- Assess pain using appropriate pain scale- Administer analgesics based on type and severity of pain and evaluate response- Implement non-pharmacological measures as appropriate and evaluate response- Consider cultural and social influences on pain and pain management- Notify physician/advanced practitioner if interventions unsuccessful or patient reports new pain  Outcome: Progressing     Problem: INFECTION - ADULT  Goal: Absence or prevention of progression during hospitalization  Description: INTERVENTIONS:- Assess and monitor for signs and symptoms of infection- Monitor lab/diagnostic results- Monitor all insertion sites, i.e. indwelling lines, tubes, and drains- Monitor endotracheal if appropriate and nasal secretions for changes in amount and color- [place] appropriate cooling/warming therapies per order- Administer medications as ordered- Instruct and encourage patient and family to use good hand hygiene technique- Identify and instruct in appropriate isolation precautions for identified infection/condition  Outcome: Progressing     Problem: SAFETY ADULT  Goal: Patient will remain free of falls  Description: INTERVENTIONS:- Educate patient/family on patient safety including physical limitations- Instruct patient to call for assistance with activity - Consult OT/PT to assist with strengthening/mobility - Keep Call bell within reach- Keep bed low and locked with side rails adjusted as appropriate- Keep care items and personal belongings within reach- Initiate and maintain comfort rounds- Make Fall Risk Sign visible to staff- Offer Toileting every 2 Hours, in advance of need- Initiate/Maintain bed/chair alarm- Obtain necessary fall risk management equipment: - Apply yellow socks and bracelet for high fall risk patients-  Consider moving patient to room near nurses station  Outcome: Progressing  Goal: Maintain or return to baseline ADL function  Description: INTERVENTIONS:-  Assess patient's ability to carry out ADLs; assess patient's baseline for ADL function and identify physical deficits which impact ability to perform ADLs (bathing, care of mouth/teeth, toileting, grooming, dressing, etc.)- Assess/evaluate cause of self-care deficits - Assess range of motion- Assess patient's mobility; develop plan if impaired- Assess patient's need for assistive devices and provide as appropriate- Encourage maximum independence but intervene and supervise when necessary- Involve family in performance of ADLs- Assess for home care needs following discharge - Consider OT consult to assist with ADL evaluation and planning for discharge- Provide patient education as appropriate  Outcome: Progressing  Goal: Maintains/Returns to pre admission functional level  Description: INTERVENTIONS:- Perform AM-PAC 6 Click Basic Mobility/ Daily Activity assessment daily.- Set and communicate daily mobility goal to care team and patient/family/caregiver. - Collaborate with rehabilitation services on mobility goals if consulted- Reposition patient every 2 hours.- Out of bed for meals 3 times a day- Out of bed for toileting- Record patient progress and toleration of activity level   Outcome: Progressing     Problem: DISCHARGE PLANNING  Goal: Discharge to home or other facility with appropriate resources  Description: INTERVENTIONS:- Identify barriers to discharge w/patient and caregiver- Arrange for needed discharge resources and transportation as appropriate- Identify discharge learning needs (meds, wound care, etc.)- Arrange for interpretive services to assist at discharge as needed- Refer to Case Management Department for coordinating discharge planning if the patient needs post-hospital services based on physician/advanced practitioner order or complex needs  related to functional status, cognitive ability, or social support system  Outcome: Progressing     Problem: Knowledge Deficit  Goal: Patient/family/caregiver demonstrates understanding of disease process, treatment plan, medications, and discharge instructions  Description: Complete learning assessment and assess knowledge base.Interventions:- Provide teaching at level of understanding- Provide teaching via preferred learning methods  Outcome: Progressing     Problem: Prexisting or High Potential for Compromised Skin Integrity  Goal: Skin integrity is maintained or improved  Description: INTERVENTIONS:- Identify patients at risk for skin breakdown- Assess and monitor skin integrity- Assess and monitor nutrition and hydration status- Monitor labs - Assess for incontinence - Turn and reposition patient- Assist with mobility/ambulation- Relieve pressure over bony prominences- Avoid friction and shearing- Provide appropriate hygiene as needed including keeping skin clean and dry- Evaluate need for skin moisturizer/barrier cream- Collaborate with interdisciplinary team - Patient/family teaching- Consider wound care consult   Outcome: Progressing     Problem: METABOLIC, FLUID AND ELECTROLYTES - ADULT  Goal: Electrolytes maintained within normal limits  Description: INTERVENTIONS:- Monitor labs and assess patient for signs and symptoms of electrolyte imbalances- Administer electrolyte replacement as ordered- Monitor response to electrolyte replacements, including repeat lab results as appropriate- Instruct patient on fluid and nutrition as appropriate  Outcome: Progressing  Goal: Fluid balance maintained  Description: INTERVENTIONS:- Monitor labs - Monitor I/O and WT- Instruct patient on fluid and nutrition as appropriate- Assess for signs & symptoms of volume excess or deficit  Outcome: Progressing     Problem: Nutrition/Hydration-ADULT  Goal: Nutrient/Hydration intake appropriate for improving, restoring or maintaining  nutritional needs  Description: Monitor and assess patient's nutrition/hydration status for malnutrition. Collaborate with interdisciplinary team and initiate plan and interventions as ordered.  Monitor patient's weight and dietary intake as ordered or per policy. Utilize nutrition screening tool and intervene as necessary. Determine patient's food preferences and provide high-protein, high-caloric foods as appropriate. INTERVENTIONS:- Monitor oral intake, urinary output, labs, and treatment plans- Assess nutrition and hydration status and recommend course of action- Evaluate amount of meals eaten- Assist patient with eating if necessary - Allow adequate time for meals- Recommend/ encourage appropriate diets, oral nutritional supplements, and vitamin/mineral supplements- Order, calculate, and assess calorie counts as needed- Recommend, monitor, and adjust tube feedings and TPN/PPN based on assessed needs- Assess need for intravenous fluids- Provide specific nutrition/hydration education as appropriate- Include patient/family/caregiver in decisions related to nutrition  Outcome: Progressing

## 2025-04-11 NOTE — INCIDENTAL FINDINGS
The following findings require follow up:  Radiographic finding   Findin-75% stenosis in the proximal popliteal artery on the right  50-75% stenosis at the proximal tibio-peroneal trunk on the right  >75% stenosis in the proximal popliteal artery on the left    Follow up required: Vascular surgery follow up for monitoring    Follow up should be done within 6 month(s)    Incidental finding results were discussed with the Patient by Jessica Pacheco PA-C on 25.   They expressed understanding and all questions answered.

## 2025-04-11 NOTE — CASE MANAGEMENT
Case Management Discharge Planning Note    Patient name Naresh Carpio  Location [facility] 618/[facility] 618-01 MRN [id number]  : 1960 Date 2025       Current Admission Date: 4/3/2025  Current Admission Diagnosis:Traumatic retroperitoneal hemorrhage   Patient Active Problem List    Diagnosis Date Noted Date Diagnosed    Pseudoaneurysm (HCC) 2025     Acute pain due to trauma 2025     ESRD on dialysis ([facility]) 2025     Wounds, multiple 2025     Traumatic retroperitoneal hemorrhage 2025     Secondary hyperparathyroidism ([facility]) 2025     At risk for electrolyte imbalance 2025     Multiple open wounds of foot 2025     Disorder of acid-base balance 2025     L2 vertebral fracture ([facility]) 2025     Non-compliance with treatment 2025     Generalized weakness 2025     Gout 2025     Closed fracture of proximal end of left humerus with routine healing 02/10/2025     Left shoulder pain 2025     ESRD (end stage renal disease) on dialysis ([facility]) 2025     Primary hypertension 2025     Anemia in ESRD (end-stage renal disease)  ([facility]) 2025     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, on chronic dialysis ([facility]) 2025     Renal lesion 2024     Chronic wound 2024     Chronic diastolic (congestive) heart failure ([facility]) 2024     Pleural effusion 2024     Elevated troponin 2024     Fall 2024     Melena 2024     Paroxysmal atrial fibrillation ([facility])      ESRD (end stage renal disease) ([facility]) 10/25/2024     Nausea 10/20/2024     Hyponatremia 10/19/2024     Acute on chronic anemia 10/14/2024     Falls 10/08/2024     PAD (peripheral artery disease) ([facility]) 10/08/2024     Closed fracture of right pelvis ([facility]) 2024     Bilateral sacral fracture, closed ([facility]) 2024     Hyperkalemia 2024     Difficulty with speech 2024     History of  amputation of hallux (HCC) 03/18/2024     Facial cellulitis 03/12/2024     Constipation 09/06/2023     Cellulitis of right foot      Polymicrobial bacterial infection 08/24/2023     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 mellitus, with necrosis of bone (HCC)      Acquired deformity of foot, right      Charcot's joint      Subacute osteomyelitis of right foot (HCC)      Open wound of right foot 08/21/2023     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 mellitus, with bone involvement without evidence of necrosis (HCC)      Diabetic ulcer of left midfoot associated with type 2 diabetes mellitus, limited to breakdown of skin (HCC)      Diabetic polyneuropathy associated with type 2 diabetes mellitus (HCC)      Diabetes mellitus type 2 with peripheral artery disease (HCC)      History of amputation of lesser toe of left foot (HCC)      Onychomycosis      Status post fall 08/19/2023     Traumatic rhabdomyolysis ([facility]) 08/19/2023     Osteoarthritis of left hip 07/27/2022     Severe Left Orchalgia 07/26/2022     Right shoulder pain 07/26/2022     Left hip pain 07/06/2022     Hemodialysis status ([facility])      Hypervolemia      Diarrhea 01/22/2022     Anemia due to chronic kidney disease, on chronic dialysis (HCC) 01/21/2022     History of prediabetes      Hypertension      History of TIA (transient ischemic attack)      T10 vertebral fracture ([facility])        LOS (days): 8  Geometric Mean LOS (GMLOS) (days): 6.9  Days to GMLOS:-1.5     OBJECTIVE:  Risk of Unplanned Readmission Score: 68.19         Current admission status: Inpatient   Preferred Pharmacy:   [facility] [address] [place] [address] [address] [place] [address]  [address] [address]  Phone: [phone] Fax: [phone]    Primary Care Provider: Jeffrey Jackson    Primary Insurance: MEDICARE  Secondary Insurance:     DISCHARGE DETAILS:    Pt made aware that the facility will set up transport to HD, but that  he will be responsible for payment.  Pt unhappy with this arrangement and states he can't afford  Pt's sister subsequently called me and reiterated those conerns. Requesting a referral to [facility]  CM placed  Pt's sister states the Pt CAN'T leave, until this is sorted out.

## 2025-04-11 NOTE — ASSESSMENT & PLAN NOTE
History of a fall onto a rail a few days ago. Continued to have progressive pain  Presented to [place] ED, found to have retroperitoneal hemorrhage with areas of active contrast extravasation. [facility]  Required 1u PRBC 4/7  Management per primary service

## 2025-04-11 NOTE — ASSESSMENT & PLAN NOTE
TTS at [facility] [place]  Access: Right IJ permcath  EDW: 99 kg per previous hospital records  Continue with current dialysis schedule  Due for HD Saturday 4/12/25   Pt approved for [facility] per [facility] admissions. Likely to be transferred later today to unit

## 2025-04-11 NOTE — ASSESSMENT & PLAN NOTE
Lab Results   Component Value Date    EGFR 12 04/11/2025    EGFR 6 04/10/2025    EGFR 9 04/09/2025    CREATININE 4.63 (H) 04/11/2025    CREATININE 7.50 (H) 04/10/2025    CREATININE 5.53 (H) 04/09/2025     - Chronic anemia due to ESRD, baseline Hgb is 8-10  - Patient currently has acute blood loss anemia due to retroperitoneal hematoma  - s/p 4 units of pRBC during this hospitalization  - Goal is Hgb > 7  - Continue to monitor Hgb and transfuse for Hgb < 7 or symptomatic anemia  - 4/9 start SQH for DVT ppx and check 4/10 AM Labs, If hgb remains stable recommend restarting home Eliquis

## 2025-04-12 ENCOUNTER — APPOINTMENT (INPATIENT)
Dept: DIALYSIS | Facility: HOSPITAL | Age: 65
DRG: 939 | End: 2025-04-12
Payer: MEDICARE

## 2025-04-12 PROBLEM — E87.5 HYPERKALEMIA: Status: RESOLVED | Noted: 2024-04-06 | Resolved: 2025-04-12

## 2025-04-12 PROBLEM — R11.0 NAUSEA: Status: RESOLVED | Noted: 2024-10-20 | Resolved: 2025-04-12

## 2025-04-12 PROBLEM — T14.8XXA WOUND OF SKIN: Status: ACTIVE | Noted: 2025-04-12

## 2025-04-12 PROBLEM — S32.9XXA CLOSED FRACTURE OF RIGHT PELVIS (HCC): Status: RESOLVED | Noted: 2024-07-03 | Resolved: 2025-04-12

## 2025-04-12 PROBLEM — R29.6 FALLS: Status: RESOLVED | Noted: 2024-10-08 | Resolved: 2025-04-12

## 2025-04-12 PROBLEM — A49.9 POLYMICROBIAL BACTERIAL INFECTION: Status: RESOLVED | Noted: 2023-08-24 | Resolved: 2025-04-12

## 2025-04-12 PROBLEM — Z91.89 AT RISK FOR VENOUS THROMBOEMBOLISM (VTE): Status: ACTIVE | Noted: 2025-04-12

## 2025-04-12 PROBLEM — S32.10XA SACRAL FRACTURE, CLOSED (HCC): Status: RESOLVED | Noted: 2024-07-03 | Resolved: 2025-04-12

## 2025-04-12 PROBLEM — Z78.9 IMPAIRED MOBILITY AND ACTIVITIES OF DAILY LIVING: Status: ACTIVE | Noted: 2025-04-12

## 2025-04-12 PROBLEM — R79.89 ELEVATED TROPONIN: Status: RESOLVED | Noted: 2024-11-19 | Resolved: 2025-04-12
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
## 2025-04-12 PROBLEM — T79.6XXA TRAUMATIC RHABDOMYOLYSIS (HCC): Status: RESOLVED | Noted: 2023-08-19 | Resolved: 2025-04-12

## 2025-04-12 PROBLEM — E83.39 HYPERPHOSPHATEMIA: Status: ACTIVE | Noted: 2025-04-12

## 2025-04-12 PROBLEM — R19.7 DIARRHEA: Status: RESOLVED | Noted: 2022-01-22 | Resolved: 2025-04-12

## 2025-04-12 PROBLEM — L03.211 FACIAL CELLULITIS: Status: RESOLVED | Noted: 2024-03-12 | Resolved: 2025-04-12

## 2025-04-12 PROBLEM — Z91.89 AT RISK FOR CONSTIPATION: Status: ACTIVE | Noted: 2023-09-06

## 2025-04-12 PROBLEM — S36.892A TRAUMATIC RETROPERITONEAL HEMATOMA: Status: ACTIVE | Noted: 2023-08-19

## 2025-04-12 PROBLEM — K92.1 MELENA: Status: RESOLVED | Noted: 2024-11-06 | Resolved: 2025-04-12

## 2025-04-12 PROBLEM — Z91.89 AT HIGH RISK FOR SKIN BREAKDOWN: Status: ACTIVE | Noted: 2025-04-12

## 2025-04-12 PROBLEM — Z74.09 IMPAIRED MOBILITY AND ACTIVITIES OF DAILY LIVING: Status: ACTIVE | Noted: 2025-04-12

## 2025-04-12 PROCEDURE — 97116 GAIT TRAINING THERAPY: CPT

## 2025-04-12 PROCEDURE — 97163 PT EVAL HIGH COMPLEX 45 MIN: CPT

## 2025-04-12 PROCEDURE — 97166 OT EVAL MOD COMPLEX 45 MIN: CPT

## 2025-04-12 PROCEDURE — G0316 PR PROLONG INPT EVAL ADD15 M: HCPCS | Performed by: PHYSICAL MEDICINE & REHABILITATION

## 2025-04-12 PROCEDURE — 87081 CULTURE SCREEN ONLY: CPT | Performed by: INTERNAL MEDICINE

## 2025-04-12 PROCEDURE — 5A1D70Z PERFORMANCE OF URINARY FILTRATION, INTERMITTENT, LESS THAN 6 HOURS PER DAY: ICD-10-PCS | Performed by: INTERNAL MEDICINE

## 2025-04-12 PROCEDURE — 99223 1ST HOSP IP/OBS HIGH 75: CPT | Performed by: PHYSICAL MEDICINE & REHABILITATION

## 2025-04-12 PROCEDURE — 97530 THERAPEUTIC ACTIVITIES: CPT

## 2025-04-12 PROCEDURE — 90935 HEMODIALYSIS ONE EVALUATION: CPT | Performed by: PHYSICIAN ASSISTANT

## 2025-04-12 PROCEDURE — NC001 PR NO CHARGE: Performed by: INTERNAL MEDICINE

## 2025-04-12 PROCEDURE — 87147 CULTURE TYPE IMMUNOLOGIC: CPT | Performed by: INTERNAL MEDICINE

## 2025-04-12 PROCEDURE — 99232 SBSQ HOSP IP/OBS MODERATE 35: CPT | Performed by: PHYSICIAN ASSISTANT

## 2025-04-12 RX ADMIN — OXYCODONE HYDROCHLORIDE 10 MG: 10 TABLET ORAL at 06:29

## 2025-04-12 RX ADMIN — NIFEDIPINE 30 MG: 30 TABLET, FILM COATED, EXTENDED RELEASE ORAL at 17:26

## 2025-04-12 RX ADMIN — ATORVASTATIN CALCIUM 80 MG: 80 TABLET, FILM COATED ORAL at 16:22

## 2025-04-12 RX ADMIN — ACETAMINOPHEN 975 MG: 325 TABLET, FILM COATED ORAL at 21:12

## 2025-04-12 RX ADMIN — OXYCODONE HYDROCHLORIDE 10 MG: 10 TABLET ORAL at 21:15

## 2025-04-12 RX ADMIN — SEVELAMER HYDROCHLORIDE 1600 MG: 800 TABLET ORAL at 13:52

## 2025-04-12 RX ADMIN — METHOCARBAMOL 750 MG: 750 TABLET ORAL at 23:17

## 2025-04-12 RX ADMIN — ACETAMINOPHEN 975 MG: 325 TABLET, FILM COATED ORAL at 05:38

## 2025-04-12 RX ADMIN — METHOCARBAMOL 750 MG: 750 TABLET ORAL at 05:38

## 2025-04-12 RX ADMIN — OXYCODONE HYDROCHLORIDE 10 MG: 10 TABLET ORAL at 16:22

## 2025-04-12 RX ADMIN — EPOETIN ALFA 3000 UNITS: 3000 SOLUTION INTRAVENOUS; SUBCUTANEOUS at 10:31

## 2025-04-12 RX ADMIN — OXYCODONE HYDROCHLORIDE 10 MG: 10 TABLET ORAL at 10:38

## 2025-04-12 RX ADMIN — ALLOPURINOL 100 MG: 100 TABLET ORAL at 13:43

## 2025-04-12 RX ADMIN — LIDOCAINE 1 PATCH: 700 PATCH TOPICAL at 21:12

## 2025-04-12 RX ADMIN — APIXABAN 5 MG: 5 TABLET, FILM COATED ORAL at 17:26

## 2025-04-12 RX ADMIN — METHOCARBAMOL 750 MG: 750 TABLET ORAL at 17:26

## 2025-04-12 RX ADMIN — METHOCARBAMOL 750 MG: 750 TABLET ORAL at 13:43

## 2025-04-12 RX ADMIN — EPOETIN ALFA 2000 UNITS: 2000 SOLUTION INTRAVENOUS; SUBCUTANEOUS at 10:31

## 2025-04-12 RX ADMIN — ACETAMINOPHEN 975 MG: 325 TABLET, FILM COATED ORAL at 13:42

## 2025-04-12 RX ADMIN — SEVELAMER HYDROCHLORIDE 1600 MG: 800 TABLET ORAL at 16:22

## 2025-04-12 RX ADMIN — APIXABAN 5 MG: 5 TABLET, FILM COATED ORAL at 13:43

## 2025-04-12 NOTE — HEMODIALYSIS
Post-Dialysis RN Treatment Note    Blood Pressure:  Pre 176/77 mm/Hg  Post 149/77 mmHg   EDW:  99 kg    Weight:  Pre 98.9 kg   Post 97.1 kg   Mode of weight measurement: Standing Scale   Volume Removed:  2501 ml    Treatment duration: 240 minutes    NS given:  No    Treatment shortened No   Medications given during Rx:   Epogen 5000 U IVP  Roxicodone 10 mg PO    Estimated Kt/V:  1.15   Access type: Permacath/TDC      Access Issues: No    Report called to primary nurse:   Yes Meaghan Ernandez RN

## 2025-04-12 NOTE — ASSESSMENT & PLAN NOTE
Continue sevelamer TID with meals  Consult nephrology for inpatient HD while in [facility]  Dialysis T-TH-Sat while here in rehab

## 2025-04-12 NOTE — ASSESSMENT & PLAN NOTE
- hgb below goal but slowly improving  - continue SURINDER 5000 units with dialysis  - transfuse as needed for hgb <7

## 2025-04-12 NOTE — PROGRESS NOTES
OT Initial Evaluation     04/12/25 1410   Patient Data   Rehab Impairment Impairment of mobility, safety and Activities of Daily Living (ADLs) due to Other Disabling Impairments:  13  Other Disabling Impairments   Etiologic Diagnosis Traumatic retroperitoneal hemorrhage   Date of Onset 04/03/25   Support System   Name Alicja   Relationship sister   Able to provide 24 hour supervision No   Able to provide physical help? No   Home Setup   Type of Home Apartment   Method of Entry Stairs   Number of Stairs 8  (8 NAZARIO main / 3 NAZARIO back entrance)   Number of Stairs in Home 0   First Floor Bathroom Full;Tub;Shower   First Floor Bathroom Accessibility Commode   First Floor Setup Available Yes   Available Equipment Standard Walker;Bedside Commode   Baseline Information   Vocation Work Full Time  ( FT)   Transportation    Prior Device(s) Used Roller Walker   Prior IADL Participation   Money Management Identify Money;Estimate Costs;Estimate Change;Combine Bills;Manage Checkbook   Meal Preparation Full Participation   Laundry Full Participation   Home Cleaning Full Participation   Prior Level of Function   Self-Care 3. Independent - Patient completed the activities by him/herself, with or without an assistive device, with no assistance from a helper.   Indoor-Mobility (Ambulation) 3. Independent - Patient completed the activities by him/herself, with or without an assistive device, with no assistance from a helper.   Stairs 3. Independent - Patient completed the activities by him/herself, with or without an assistive device, with no assistance from a helper.   Functional Cognition 3. Independent - Patient completed the activities by him/herself, with or without an assistive device, with no assistance from a helper.   Prior Device Used D. Walker  (only in the community)   Falls in the Last Year   Number of falls in the past 12 months 1   Type of Injury Associated with Fall Major injury  (current admission)    Patient Preference   Nickname (Patient Preference) Naresh   Psychosocial   Psychosocial (WDL) X   Patient Behaviors/Mood Flat affect;Withdrawn   Needs Expressed Physical   Ability to Express Feelings Able to express   Ability to Express Needs Able to express   Ability to Express Thoughts Able to express   Ability to Understand Others Understands   Restrictions/Precautions   Precautions Bed/chair alarms;Cognitive;Fall Risk;Pain;Supervision on toilet/commode;Spinal precautions   RUE Weight Bearing Per Order WBAT   LUE Weight Bearing Per Order WBAT   RLE Weight Bearing Per Order WBAT   LLE Weight Bearing Per Order WBAT   Braces or Orthoses LSO  (OOB)   Pain Assessment   Pain Assessment Tool 0-10   Pain Score 4   Pain Location/Orientation Orientation: Right;Orientation: Lower;Location: Back   Hospital Pain Intervention(s) Rest;Emotional support   Eating Assessment   Type of Assistance Needed Physical assistance   Physical Assistance Level 25% or less   Comment seated in recliner; compared to baseline, pt would req A due to dec balance, weakness to prep meals/retrieve food   Eating CARE Score 3   Oral Hygiene   Type of Assistance Needed Physical assistance   Physical Assistance Level 25% or less   Comment did not formally assess but based off of functional mobility, pt will need A due to dec balance, weakness   Oral Hygiene CARE Score 3   Shower/Bathe Self   Type of Assistance Needed Physical assistance   Physical Assistance Level 76% or more   Comment did not formally assess but based off of current functional status, acute care eval, and spinal precautions, pt would req max A to complete SB; baseline was SB seated at sink   Shower/Bathe Self CARE Score 2   Bathing   Assessed Bath Style Sponge Bath   Anticipated D/C Bath Style Sponge Bath   Dressing/Undressing Clothing   Type of Assistance Needed Physical assistance   Physical Assistance Level 76% or more   Comment did not formally assess but based off of current  "functional status, acute care eval, and spinal precautions, pt would req max A for dressing and LSO brace mgmt   Upper Body Dressing CARE Score 2   Type of Assistance Needed Physical assistance   Physical Assistance Level 76% or more   Comment did not formally assess but based off of current functional status, acute care eval, and spinal precautions, pt would req max A for LB dressing and may need edu on LHAE   Lower Body Dressing CARE Score 2   Putting On/Taking Off Footwear   Type of Assistance Needed Physical assistance   Physical Assistance Level 76% or more   Comment did not formally assess but based off of current functional status, acute care eval, and spinal precautions, pt would req max A for sock/shoe mgmt w/ edu for LHAE/crossed leg tech   Putting On/Taking Off Footwear CARE Score 2   Toileting Hygiene   Type of Assistance Needed Physical assistance   Physical Assistance Level 76% or more   Comment did not formally assess but based off of current functional status, acute care eval, and spinal precautions, pt would req max A for hygiene/cm; toilet transfer complete during session but pt unwilling to participate in cm simulation and marked as mod-max A   Toileting Hygiene CARE Score 2   Toilet Transfer   Type of Assistance Needed Physical assistance   Physical Assistance Level 76% or more   Comment due to limited time for eval, only completed toilet transfer during session using RW; eval completed at end of tx day and pt very irritable due to pain post HD. OT asked if pt could complete transfer SPT to BSC and pt refused and then offered to walk to  instead. Pt cont to be frustrated and aggressively threw call bell and pushed tray away before getting up trying to walk w/o RW. Pt able to walk w/ RW to BSC over toilet w/ mod Ax1. OT explained reasoning behind transfer but pt reported \"I don't care but this is over\"   Toilet Transfer CARE Score 2   Sit to Stand   Type of Assistance Needed Physical assistance "   Physical Assistance Level 51%-75%   Comment using RW for STS w/ inc reports of pain during mobility   Sit to Stand CARE Score 2   Comprehension   QI: Comprehension 3. Usually Understands: Understands most conversations, but misses some part/intent of message. Requires cues at times to understand.   Comprehension (FIM) 6 - Understands complex/abstract but requires more time   Expression   QI: Expression 3. Exhibits some difficulty with expressing needs and ideas (e.g., some words or finishing thoughts) or speech is not clear   Expression (FIM) 6 - Has only MILD difficulty with complex/abstract info   Social Interaction   Social Interaction (FIM) 6 - Interacts appropriately with others BUT requires extra  time   Problem Solving   Problem solving (FIM) 6 - Solves complex problems BUT requires extra time   Memory   Memory (FIM) 7 - Remembers daily routines   RUE Assessment   RUE Assessment WFL   LUE Assessment   LUE Assessment WFL   Sensation   Light Touch Not tested   Sharp/Dull Not tested   Stereognosis Not tested   Propioception Not tested   Cognition   Overall Cognitive Status Impaired   Arousal/Participation Arousable   Attention Attends with cues to redirect   Orientation Level Oriented X4   Memory Decreased recall of precautions   Following Commands Follows multistep commands with increased time or repetition   Comments pt very irritable w/ intake interview as he felt all of these questions were repetitive. OT expressed understanding but crucial part to eval as pt lives alone and can provide most accurate depitction of home/background info. Pt very fatigued t/o brief eval and more frustrated w/ questions. pt became slightly aggressive when asked to get out of recliner to complete toilet transfer. pt threw call bell onto bed, pushed tray away, attempted to take LSO off, and started walking before RW retrieved. overall, pt demo dec frustration aaron which may be due to fatigue following HD and pain in low back  "  Vision   Vision Comments L eye visually imp (since young kid) and R eye NEW visual imp   Discharge Information   Vocational Plan Full Time   Barriers to Return to Vocation Strength;Endurance   Patient's Discharge Plan \"go home\"   Patient's Rehab Expectations \"get stronger and get out of here\"   Barriers to Discharge Home Limited Family Support;Decreased Strength;Decreased Endurance;Pain;Safety Considerations   Impressions Pt is a 64 y.o. male who was admitted on 4/3/25 due to fall at home. Pt was dx with Traumatic retroperitoneal hemorrhage and transferred to [facility] to receive skilled therapy services. Pt  has a past medical history of Acute cystitis, Acute on chronic anemia, Atrial fibrillation (HCC), Bradycardia, Diabetes mellitus ([facility]), ESRD (end stage renal disease) on dialysis ([facility]), Hematuria, Hematuria, Hyperkalemia, Hyperkalemia, Hyperlipidemia, Hypertension, Left toe amputee ([facility]), TIA (transient ischemic attack), and Toxic metabolic encephalopathy.. Current precautions include bed/chair alarms, cognitive deficits, fall risk, pain, lumbar/thoracic spinal , supervision on toilet/commode, and WBAT. See flowsheet for details of pts home setup, PLOF and current functional status. Pts impairments include pain, endurance, activity tolerance, functional mobility, forward functional reach, balance, functional standing tolerance, unsupportive home environment, decreased I w/ ADLS/IADLS, strength, visual deficits, cognitive impairments, and decreased safety awareness. These impairments along with  limited caregiver support, steps to enter, limited insight into deficits, non compliance, flat affect, difficulty performing ADLs, difficulty performing IADLs, and health management causes the inability to safely complete A/IADLs including Eating, Grooming, Bathing, UB dressing, LB dressing, Toileting, Toilet transfer, Tub/shower Transfer, IADL Management, and Brace management. Pt presents with fair rehab potential with " motivation to participate and achieve goal of DC home. Pt is unsafe to DC home at this time, recommending 2 weeks to achieve independent with assistive device level goals with walker. Plan to achieve stated goals with interventions focused on ADL Retraining , LB Dressing, UB dressing,  LHAE education/training, IADL training , Kitchen Mobilty, Meal preparation, Medication management , Functional Transfers, Functional Cognition, Standing tolerance, Standing balance , Gross motor strengthening , Energy conservation training/education, healthy coping education, Leisure and social pursuits, and community re-integration.   OT Therapy Minutes   OT Time In 1410   OT Time Out 1440   OT Total Time (minutes) 30   OT Mode of treatment - Individual (minutes) 30   OT Mode of treatment - Concurrent (minutes) 0   OT Mode of treatment - Group (minutes) 0   OT Mode of treatment - Co-treat (minutes) 0   OT Mode of Treatment - Total time(minutes) 30 minutes   OT Cumulative Minutes 30   Cumulative Minutes   Cumulative therapy minutes 30

## 2025-04-12 NOTE — ASSESSMENT & PLAN NOTE
- Rehabilitation medicine physician for daily monitoring of care, 24 hour availability for acute medical issues, medication management, and therapeutic and diagnostic assessments.  - 24 hour rehabilitation nursing 7 days per week for: management/teaching of medications, bowel/bladder routine, skin care.  - PT, OT for 2-3 hours per day, 5 to 6 days per week; 15 hours per week  - Rehabilitation Psychology as needed for adjustment and coping  - CM for barriers to discharge, community resources, and family support  - Discharge planning following to help ensure a safe and efficient discharge  - 900/7 program due to HD status, pain, expected therapy tolerance

## 2025-04-12 NOTE — PROGRESS NOTES
"Progress Note - Hospitalist   Name: Naresh Carpio 64 y.o. male I MRN: [id number]  Unit/Bed#: [facility] 964-01 I Date of Admission: 4/11/2025   Date of Service: 4/12/2025 I Hospital Day: 1    Assessment & Plan  Status post fall  Pt was admitted to the trauma service and found to have L2 vertebral body and retroperitoneal hematoma.  He was admitted to the trauma service who administered multiple units of PRBCs and had IR embolization of the distal right deep circumflex iliac artery and distal right superior gluteal artery both of which supply an area of active extravasation and then pseudoaneurysms present at distal branches of the left inferior gluteal artery. The artery was embolized using coils and gelfoam.   He stabilized and was discharged to [facility] for rehabilitation  Monitor hemoglobin with Eliquis use  Now admitted to [facility] for rehab  Anemia due to chronic kidney disease, on chronic dialysis ([facility])  Monitor hemoglobin while in rehab  Nephrology consulted for ESRD/dialysis  ESRD (end stage renal disease) ([facility])  Continue sevelamer TID with meals  Consult nephrology for inpatient HD while in [facility]  Dialysis T-TH-Sat while here in rehab  Paroxysmal atrial fibrillation ([facility])  Rate is controlled with procardia  Anticoagulation with Eliquis  Chronic diastolic (congestive) heart failure ([facility])  Wt Readings from Last 3 Encounters:   04/12/25 98.9 kg (218 lb)   04/11/25 98.5 kg (217 lb 2.5 oz)   03/24/25 107 kg (234 lb 12.6 oz)   Appears euvolemic except for LE pitting edema which appears to be chronic  Renal diet  Monitor intake, output, daily weights  Volume management with HD    Primary hypertension  BP is acceptable, continue nifedipine  BP high last night but better today after dialysis  Visual disturbance  Reports a right eye \"floater\" earlier in his hospitalization which has resolved but continues to have blurry central vision.  Denies eye pain  Ophthalmology consulted for eye examination  Hyperphosphatemia      The above " assessment and plan was reviewed and updated as determined by my evaluation of the patient on 4/12/2025.    History of Present Illness   Patient seen and examined. Patients overnight issues or events were reviewed with nursing staff. New or overnight issues include the following:   Patient seen after dialysis today. He was sitting in his recliner with his blanket over his head. He states he is very worried about starting therapy for a variety of reasons. I reassured him that he just has his evals today to see what he can do and what he needs to work on    Review of Systems    Objective :  Temp:  [97.5 °F (36.4 °C)-98.3 °F (36.8 °C)] 98 °F (36.7 °C)  HR:  [60-71] 70  BP: (136-176)/(55-92) 149/77  Resp:  [17-20] 18  SpO2:  [98 %-100 %] 99 %  O2 Device: None (Room air)    Invasive Devices       Hemodialysis Catheter  Duration             HD Permanent Double Catheter 591 days                    Physical Exam  General Appearance: NAD; pleasant  HEENT: PERRLA, conjuctiva normal; mucous membranes moist; face symmetrical  Neck:  Supple  Lungs: clear bilaterally, normal respiratory effort, no retractions, expiratory effort normal, on room air  CV: regular rate and rhythm, no murmurs rubs or gallops noted   ABD: soft non tender, +BS x4  EXT: DP pulses intact, no lymphadenopathy  Skin: normal turgor, normal texture, no rash  Psych: affect normal, mood normal  Neuro: AAOx3    The above physical exam was reviewed and updated as determined by my evaluation of the patient on 4/12/2025.      Lab Results: I have reviewed the following results:  Results from last 7 days   Lab Units 04/11/25  0508 04/10/25  0820   WBC Thousand/uL 7.55 7.81   HEMOGLOBIN g/dL 7.4* 7.4*   HEMATOCRIT % 25.0* 23.7*   PLATELETS Thousands/uL 378 349     Results from last 7 days   Lab Units 04/11/25  0508 04/10/25  0820   SODIUM mmol/L 133* 132*   POTASSIUM mmol/L 4.4 4.4   CHLORIDE mmol/L 96 93*   CO2 mmol/L 28 28   BUN mg/dL 30* 52*   CREATININE mg/dL 4.63*  7.50*   CALCIUM mg/dL 9.1 9.0         Results from last 7 days   Lab Units 04/07/25  1126   INR  1.20*     Results from last 7 days   Lab Units 04/11/25  0714 04/10/25  1810 04/10/25  0708   POC GLUCOSE mg/dl 93 115 102         Review of Scheduled Meds: Medications  reviewed and reconciled as needed  Current Facility-Administered Medications   Medication Dose Route Frequency Provider Last Rate    acetaminophen  975 mg Oral Q8H [facility] Damian Cerda MD      allopurinol  100 mg Oral Daily Damian Cerda MD      apixaban  5 mg Oral BID Damian Cerda MD      atorvastatin  80 mg Oral Daily With Dinner Damian Cerda MD      epoetin jessica  3,000 Units Intravenous After Dialysis Damian Cerda MD      And    epoetin jessica  2,000 Units Intravenous After Dialysis Damian Cerda MD      [START ON 4/14/2025] gabapentin  100 mg Oral Once per day on Monday Wednesday Friday Damian Cerda MD      lidocaine  1 patch Topical Daily PRN Damian Cerda MD      methocarbamol  750 mg Oral Q6H [facility] Damian Cerda MD      naloxone  0.04 mg Intravenous Q1MIN PRN Damian Cerda MD      NIFEdipine  30 mg Oral After Dinner Damian Cerda MD      oxyCODONE  5 mg Oral Q4H PRN Damian Cerda MD      Or    oxyCODONE  10 mg Oral Q4H PRN Damian Cerda MD      senna-docusate sodium  1 tablet Oral HS Damian Cerda MD      sevelamer  1,600 mg Oral TID With Meals Damian Cerda MD         VTE Pharmacologic Prophylaxis: eliquis  Code Status: Level 1 - Full Code  Current Length of Stay: 1 day(s)    Administrative Statements     ** Please Note:  voice to text software may have been used in the creation of this document. Although proof errors in transcription or interpretation are a potential of such software**

## 2025-04-12 NOTE — ASSESSMENT & PLAN NOTE
"- Reporting \"floater\" within the right eye with acute onset on 4/11/25 at mid-day while watching TV; describes a dark, \"spider\"-shaped floater that occupies a significant portion of the visual field, painless; has similar floaters in the left eye but smaller  - Ophthalmology consulted on [facility] admission by IM, per prior trauma surgery documentation, Dr. Chaudhry of ophthalmology was made personally aware of clinical situation on 4/11/25 prior to [facility] admission  > Ophthalmology consult 4/12: Exam c/w proliferative diabetic retinopathy with vitreous hemorrhage. Pt will follow up with ophtho immediately after discharge and for referral to a retina specialist    - Monitor for changes in vision  "

## 2025-04-12 NOTE — CONSULTS
Progress note to follow today.  Please see consult note from 4/3/25.  This is continuation of care.

## 2025-04-12 NOTE — ASSESSMENT & PLAN NOTE
Wt Readings from Last 3 Encounters:   04/12/25 98.9 kg (218 lb)   04/11/25 98.5 kg (217 lb 2.5 oz)   03/24/25 107 kg (234 lb 12.6 oz)   - not on diuretics, challenge edw as tolerated with UF on HD

## 2025-04-12 NOTE — ASSESSMENT & PLAN NOTE
- Evaluated by APS on acute care  - Tylenol 975 mg q8h scheduled  - Gabapentin 100 mg three times weekly  - Methocarbamol 750 mg q8h scheduled  - Lidocaine patches daily PRN  - Oxycodone 5 or 10 mg q4h PRN, wean as possible

## 2025-04-12 NOTE — PROGRESS NOTES
Progress Note - Nephrology   Name: Naresh Carpio 64 y.o. male I MRN: [id number]  Unit/Bed#: -01 I Date of Admission: 4/11/2025   Date of Service: 4/12/2025 I Hospital Day: 1     Assessment & Plan  ESRD (end stage renal disease) (HCC)  - TTS @ [facility]  - access: right IJ perm cath  - EDW 99kg previously, last HD post weight 98.6kg  - challenge weight as tolerated, weight should be around 96.5kg after HD today if able to fully UF to goal   - HD today    HEMODIALYSIS PROCEDURE NOTE  The patient was seen and examined on hemodialysis.  Tolerating well.  BP acceptable.  Time: 4 hours  Sodium: 138 Blood flow: 400   Dialyzer: F160 Potassium: 3 Dialysate flow: 500   Access: cvc Bicarbonate: 35 Ultrafiltration goal: 3500   Medications on HD: SURINDER     Primary hypertension  - BP overall stable  - continue nifedipine 30mg daily  - continue to monitor  Anemia due to chronic kidney disease, on chronic dialysis (HCC)  - hgb below goal but slowly improving  - continue SURINDER 5000 units with dialysis  - transfuse as needed for hgb <7  Hyperphosphatemia  - continue sevelamer with meals  Chronic diastolic (congestive) heart failure (HCC)  Wt Readings from Last 3 Encounters:   04/12/25 98.9 kg (218 lb)   04/11/25 98.5 kg (217 lb 2.5 oz)   03/24/25 107 kg (234 lb 12.6 oz)   - not on diuretics, challenge edw as tolerated with UF on HD  Status post fall  - per rehab team  - resulted in retroperitoneal hemorrhage  - s/p IR embolization  Paroxysmal atrial fibrillation (HCC)  - rate controlled with procardia  - on [facility]    Nephrology service will follow.    Subjective   Patient with multiple complaints of back pain and vision changes.  Understands ophthalmology will be in to see him this admission.  Denies sob and problems with dialysis. States LE edema is improving.     Objective :  Temp:  [97.5 °F (36.4 °C)-99 °F (37.2 °C)] 97.5 °F (36.4 °C)  HR:  [61-66] 61  BP: (126-170)/(44-92) 139/58  Resp:  [16-18] 18  SpO2:  [98 %-100 %] 99  %  O2 Device: None (Room air)    Current Weight: Weight - Scale: 98.9 kg (218 lb)  First Weight: Weight - Scale: 98.9 kg (218 lb)  I/O       None          Physical Exam  General: NAD  Neuro: alert awake  Psych: mood and affect appropriate  Skin: no rash  Eyes: anicteric  ENMT: mm moist  Neck: no masses  Respiratory: ctab  Cardiovascular: rrr  Extremities: + bilateral LE edema  Gastrointestinal: soft nt nd    Medications:    Current Facility-Administered Medications:     acetaminophen (TYLENOL) tablet 975 mg, 975 mg, Oral, Q8H KEMAL, Damian Cerda MD, 975 mg at 04/12/25 0538    allopurinol (ZYLOPRIM) tablet 100 mg, 100 mg, Oral, Daily, Damian Cerda MD    apixaban (ELIQUIS) tablet 5 mg, 5 mg, Oral, BID, Damian Cerda MD    atorvastatin (LIPITOR) tablet 80 mg, 80 mg, Oral, Daily With Dinner, Damian Cerda MD    epoetin jessica (EPOGEN,PROCRIT) injection 3,000 Units, 3,000 Units, Intravenous, After Dialysis **AND** epoetin jessica (EPOGEN,PROCRIT) injection 2,000 Units, 2,000 Units, Intravenous, After Dialysis, Damian Cerda MD    [START ON 4/14/2025] gabapentin (NEURONTIN) capsule 100 mg, 100 mg, Oral, Once per day on Monday Wednesday Friday, Damian Cerda MD    lidocaine (LIDODERM) 5 % patch 1 patch, 1 patch, Topical, Daily PRN, Damian Cerda MD    methocarbamol (ROBAXIN) tablet 750 mg, 750 mg, Oral, Q6H KEMAL, Damian Cerda MD, 750 mg at 04/12/25 0538    naloxone (NARCAN) 0.04 mg/mL syringe 0.04 mg, 0.04 mg, Intravenous, Q1MIN PRN, Damian Cerda MD    NIFEdipine (PROCARDIA XL) 24 hr tablet 30 mg, 30 mg, Oral, After Dinner, Damian Cerda MD    oxyCODONE (ROXICODONE) IR tablet 5 mg, 5 mg, Oral, Q4H PRN **OR** oxyCODONE (ROXICODONE) immediate release tablet 10 mg, 10 mg, Oral, Q4H PRN, Damian Cerda MD, 10 mg at 04/12/25 0629    senna-docusate sodium (SENOKOT S) 8.6-50 mg per tablet 1 tablet, 1 tablet, Oral, HS, Damian Cerda MD, 1 tablet at 04/11/25 2202    sevelamer (RENAGEL) tablet 1,600 mg, 1,600 mg, Oral, TID  With Meals, Damian Cerda MD      Lab Results: I have reviewed the following results:  Results from last 7 days   Lab Units 04/11/25  0508 04/10/25  0820 04/09/25  0541 04/08/25  1414 04/08/25  0801 04/07/25  1602 04/07/25  0602 04/06/25  0910   WBC Thousand/uL 7.55 7.81 8.98  --  11.97*  --  11.37* 11.08*   HEMOGLOBIN g/dL 7.4* 7.4* 7.3* 7.0* 7.0* 7.0* 6.5* 7.2*   HEMATOCRIT % 25.0* 23.7* 23.5* 22.7* 21.6* 21.4* 21.4* 22.5*   PLATELETS Thousands/uL 378 349 327  --  292  --  312 282   POTASSIUM mmol/L 4.4 4.4 4.1  --  5.6*  --  5.0 4.6   CHLORIDE mmol/L 96 93* 94*  --  93*  --  93* 93*   CO2 mmol/L 28 28 26  --  23  --  25 28   BUN mg/dL 30* 52* 34*  --  62*  --  49* 39*   CREATININE mg/dL 4.63* 7.50* 5.53*  --  8.57*  --  7.10* 5.82*   CALCIUM mg/dL 9.1 9.0 8.9  --  8.9  --  8.9 9.1   MAGNESIUM mg/dL  --  2.2 2.2  --   --   --   --   --    PHOSPHORUS mg/dL  --  5.3* 4.3*  --   --   --   --   --

## 2025-04-12 NOTE — ASSESSMENT & PLAN NOTE
> 4/14: Hgb 7.6    - Trend Hgb on CBC  - Epoetin jessica with dialysis  - Transfusions per IM, appreciate their recommendations

## 2025-04-12 NOTE — ASSESSMENT & PLAN NOTE
- Moisturize skin daily with skin nourishing cream  - Ehob cushion in chair when out of bed.  - Preventative hydraguard to bilateral heels BID and PRN.   - Calazime paste to sacrum/buttocks BID and PRN  - Monitor clinically for breakdown, frequent turns  - Wound care RN consulted and following

## 2025-04-12 NOTE — PLAN OF CARE
Problem: PAIN - ADULT  Goal: Verbalizes/displays adequate comfort level or baseline comfort level  Description: Interventions:- Encourage patient to monitor pain and request assistance- Assess pain using appropriate pain scale- Administer analgesics based on type and severity of pain and evaluate response- Implement non-pharmacological measures as appropriate and evaluate response- Consider cultural and social influences on pain and pain management- Notify physician/advanced practitioner if interventions unsuccessful or patient reports new pain  Outcome: Progressing     Problem: INFECTION - ADULT  Goal: Absence or prevention of progression during hospitalization  Description: INTERVENTIONS:- Assess and monitor for signs and symptoms of infection- Monitor lab/diagnostic results- Monitor all insertion sites, i.e. indwelling lines, tubes, and drains- Monitor endotracheal if appropriate and nasal secretions for changes in amount and color- Abell appropriate cooling/warming therapies per order- Administer medications as ordered- Instruct and encourage patient and family to use good hand hygiene technique- Identify and instruct in appropriate isolation precautions for identified infection/condition  Outcome: Progressing  Goal: Absence of fever/infection during neutropenic period  Description: INTERVENTIONS:- Monitor WBC  Outcome: Progressing     Problem: SAFETY ADULT  Goal: Patient will remain free of falls  Description: INTERVENTIONS:- Educate patient/family on patient safety including physical limitations- Instruct patient to call for assistance with activity - Consult OT/PT to assist with strengthening/mobility - Keep Call bell within reach- Keep bed low and locked with side rails adjusted as appropriate- Keep care items and personal belongings within reach- Initiate and maintain comfort rounds- Make Fall Risk Sign visible to staff- Offer Toileting every  Hours, in advance of need- Initiate/Maintain alarm- Obtain  necessary fall risk management equipment: - Apply yellow socks and bracelet for high fall risk patients- Consider moving patient to room near nurses station  Outcome: Progressing  Goal: Maintain or return to baseline ADL function  Description: INTERVENTIONS:-  Assess patient's ability to carry out ADLs; assess patient's baseline for ADL function and identify physical deficits which impact ability to perform ADLs (bathing, care of mouth/teeth, toileting, grooming, dressing, etc.)- Assess/evaluate cause of self-care deficits - Assess range of motion- Assess patient's mobility; develop plan if impaired- Assess patient's need for assistive devices and provide as appropriate- Encourage maximum independence but intervene and supervise when necessary- Involve family in performance of ADLs- Assess for home care needs following discharge - Consider OT consult to assist with ADL evaluation and planning for discharge- Provide patient education as appropriate  Outcome: Progressing  Goal: Maintains/Returns to pre admission functional level  Description: INTERVENTIONS:- Perform AM-PAC 6 Click Basic Mobility/ Daily Activity assessment daily.- Set and communicate daily mobility goal to care team and patient/family/caregiver. - Collaborate with rehabilitation services on mobility goals if consulted- Perform Range of Motion  times a day.- Reposition patient every  hours.- Dangle patient  times a day- Stand patient  times a day- Ambulate patient  times a day- Out of bed to chair  times a day - Out of bed for meals  times a day- Out of bed for toileting- Record patient progress and toleration of activity level   Outcome: Progressing     Problem: DISCHARGE PLANNING  Goal: Discharge to home or other facility with appropriate resources  Description: INTERVENTIONS:- Identify barriers to discharge w/patient and caregiver- Arrange for needed discharge resources and transportation as appropriate- Identify discharge learning needs (meds, wound  care, etc.)- Arrange for interpretive services to assist at discharge as needed- Refer to Case Management Department for coordinating discharge planning if the patient needs post-hospital services based on physician/advanced practitioner order or complex needs related to functional status, cognitive ability, or social support system  Outcome: Progressing     Problem: Prexisting or High Potential for Compromised Skin Integrity  Goal: Skin integrity is maintained or improved  Description: INTERVENTIONS:- Identify patients at risk for skin breakdown- Assess and monitor skin integrity- Assess and monitor nutrition and hydration status- Monitor labs - Assess for incontinence - Turn and reposition patient- Assist with mobility/ambulation- Relieve pressure over bony prominences- Avoid friction and shearing- Provide appropriate hygiene as needed including keeping skin clean and dry- Evaluate need for skin moisturizer/barrier cream- Collaborate with interdisciplinary team - Patient/family teaching- Consider wound care consult   Outcome: Progressing     Problem: METABOLIC, FLUID AND ELECTROLYTES - ADULT  Goal: Electrolytes maintained within normal limits  Description: INTERVENTIONS:- Monitor labs and assess patient for signs and symptoms of electrolyte imbalances- Administer electrolyte replacement as ordered- Monitor response to electrolyte replacements, including repeat lab results as appropriate- Instruct patient on fluid and nutrition as appropriate  Outcome: Progressing  Goal: Fluid balance maintained  Description: INTERVENTIONS:- Monitor labs - Monitor I/O and WT- Instruct patient on fluid and nutrition as appropriate- Assess for signs & symptoms of volume excess or deficit  Outcome: Progressing

## 2025-04-12 NOTE — PLAN OF CARE
Target UF Goal 2 L as tolerated. Patient dialyzing for 4 hours on 3 K bath for serum K of  4.4  per protocol. Treatment plan reviewed with Nephrology.     Problem: METABOLIC, FLUID AND ELECTROLYTES - ADULT  Goal: Electrolytes maintained within normal limits  Description: INTERVENTIONS:- Monitor labs and assess patient for signs and symptoms of electrolyte imbalances- Administer electrolyte replacement as ordered- Monitor response to electrolyte replacements, including repeat lab results as appropriate- Instruct patient on fluid and nutrition as appropriate  Outcome: Progressing  Goal: Fluid balance maintained  Description: INTERVENTIONS:- Monitor labs - Monitor I/O and WT- Instruct patient on fluid and nutrition as appropriate- Assess for signs & symptoms of volume excess or deficit  Outcome: Progressing

## 2025-04-12 NOTE — ASSESSMENT & PLAN NOTE
- Sustained from mechanical fall several days prior to initial presentation on 4/2/25, presenting with progressively worsening low back pain. Trauma imaging revealed a large retroperitoneal hematoma with active extravasation and hemoglobin drop. Now s/p transfusion, Kcentra and ultimately IR embolization of the right deep circumflex iliac artery and superior gluteal artery with Dr. Garcia on 4/3/25. Unfortunately, on 4/6/25, his hemoglobin decreased and repeat CT showed a 5 mm inferior gluteal artery branch pseudoaneurysm and he is s/p IR pelvic angiogram on 4/7/25 with Dr. Hernandez, which demonstrated pseudoaneurysms at the distal branches of the left inferior gluteal artery which was embolized.   - Continue to trend Hgb on CBC closely, now back on anticoagulation  - Contact IR if there are additional bleeding concerns given multiple prior interventions described above  - Local right hip/groin access site incision care  - Analgesia, see below  - PT and OT

## 2025-04-12 NOTE — CONSULTS
PHYSICAL MEDICINE AND REHABILITATION CONSULT NOTE  Naresh Carpio 64 y.o. male MRN: [id number]  Unit/Bed#: -01 Encounter: [id number]    Requested by (Physician/Service): Damian Cerda MD  Reason for Consultation:  Assessment of rehabilitation needs  Reason for Hospitalization:  Traumatic retroperitoneal hemorrhage [S36.899A]  Rehabilitation Diagnosis: 13  Other Disabling Impairments  Etiologic: Traumatic retroperitoneal hemorrhage     History of Present Illness:  Naresh Carpio is a 64 y.o. male who  has a past medical history of Acute cystitis (10/18/2024), Acute on chronic anemia (01/23/2022), Atrial fibrillation ([facility]), Bilateral sacral fracture, closed ([facility]) (07/03/2024), Bradycardia (09/05/2023), Diabetes mellitus ([facility]), ESRD (end stage renal disease) on dialysis ([facility]), Hematuria (10/10/2024), Hematuria (10/10/2024), Hyperkalemia (04/06/2024), Hyperkalemia (04/06/2024), Hyperlipidemia, Hypertension, Left toe amputee ([facility]), Subacute osteomyelitis of right foot ([facility]), TIA (transient ischemic attack), Toxic metabolic encephalopathy (10/08/2024), and Traumatic rhabdomyolysis ([facility]) (08/19/2023). who presented to the [facility] on 4/2/25 for evaluation of progressively worsening low back pain after a mechanical fall several days prior to presentation. He was found to have a large retroperitoneal hematoma with active extravasation. He was treated with Kcentra and stabilized with transfusion. He was transferred to [facility] on 4/3/25 for escalation of care and IR evaluation. He was evaluated by IR and is now ultimately s/p IR embolization of the right deep circumflex iliac artery and superior gluteal artery with Dr. Garcia on 4/3/25. Unfortunately, on 4/6/25, his hemoglobin decreased and repeat CT showed a 5 mm inferior gluteal artery branch pseudoaneurysm and he is s/p IR pelvic angiogram on 4/7/25 with Dr. Hernandez, which demonstrated pseudoaneurysms at the distal  branches of the left inferior gluteal artery which was embolized. He has been restarted on home anticoagulation since then with stability of his hemoglobin. His course was complicated by pain requiring acute pain service consultation and intervention with improvement of pain control. He is stable on PO pain medication at the time of [facility] admission.    Subjective: He is seen face-to-face at bedside during HD session. He describes significant concern regarding his right eye vision which was also reported to trauma surgery and admitting SLIM yesterday. Discussed ongoing plan to obtain ophthalmology evaluation and recommendations. He was oriented to the [facility], plan of care discussed and follow-up questions answered.     PM&R consulted for rehabilitation recommendations.    Restrictions include:  Lumbar spine precautions, LSO brace    Hospital Complications/Comorbidities:   Complications: As above  Comorbidities: As above    Morbid Obesity (BMI > 40) No     Last Weight Last BMI   Wt Readings from Last 1 Encounters:   25 98.9 kg (218 lb)    Body mass index is 28.76 kg/m².     Functional History:     Prior to Admission:     Functional Status: Patient was independent with mobility/ambulation, transfers, ADL's, IADL's.     Present:    ADLs  Eatin: Setup or clean-up assistance  Oral hygiene: 04: Supervision or touching  assistance  Toilet hygiene: 04: Supervision or touching  assistance  Shower/bathing self: 03: Partial/moderate assistance  Upper body dressin: Supervision or touching  assistance  Lower body dressin: Partial/moderate assistance  Putting on/taking off footwear: 03: Partial/moderate assistance    Transfers  Roll left and right: 04: Supervision or touching  assistance  Sit to lyin: Supervision or touching  assistance  Lying to sitting on side of bed: 04: Supervision or touching  assistance  Sit to stand: 04: Supervision or touching  assistance  Chair/bed to chair transfer: 03:  Partial/moderate assistance  Toilet transfer: 03: Partial/moderate assistance    Mobility  Walk 10 ft: 03: Partial/moderate assistance  Walk 50 ft with two turns: 03: Partial/moderate assistance  Walk 150ft: 88: Not attempted due to medical conditions or safety concerns    Past Medical History:   Past Surgical History:   Family History:     Past Medical History:   Diagnosis Date    Acute cystitis 10/18/2024    Acute on chronic anemia 01/23/2022    Atrial fibrillation (HCC)     Bilateral sacral fracture, closed (HCC) 07/03/2024    Bradycardia 09/05/2023    Diabetes mellitus (HCC)     ESRD (end stage renal disease) on dialysis (HCC)     Hematuria 10/10/2024    Hematuria 10/10/2024    Hyperkalemia 04/06/2024    Hyperkalemia 04/06/2024    Hyperlipidemia     Hypertension     Left toe amputee (HCC)     Subacute osteomyelitis of right foot (HCC)     TIA (transient ischemic attack)     Toxic metabolic encephalopathy 10/08/2024    Traumatic rhabdomyolysis ([facility]) 08/19/2023    Past Surgical History:   Procedure Laterality Date    AMPUTATION Left     left foot resection 10 years ago dr tran    HEMODIALYSIS ADULT  1/18/2025    HEMODIALYSIS ADULT  2/27/2025    IR EMBOLIZATION (SPECIFY VESSEL OR SITE)  4/3/2025    IR EMBOLIZATION (SPECIFY VESSEL OR SITE)  4/7/2025    IR LOWER EXTREMITY ANGIOGRAM  08/29/2023    IR TEMPORARY DIALYSIS CATHETER PLACEMENT  08/25/2023    IR TUNNELED CENTRAL LINE REMOVAL  08/23/2023    IR TUNNELED DIALYSIS CATHETER PLACEMENT  01/27/2022    IR TUNNELED DIALYSIS CATHETER PLACEMENT  08/30/2023    [place] AMPUTATION METATARSAL W/TOE SINGLE Right 8/31/2023    Procedure: RIGHT PARTIAL 1ST RAY RESECTION WITH  WOUND VAC APPLICATION;  Surgeon: Won Parmar DPM;  Location: [facility];  Service: Podiatry     History reviewed. No pertinent family history.       Medications:    Current Facility-Administered Medications:     acetaminophen (TYLENOL) tablet 975 mg, 975 mg, Oral, [facility]8H [facility], 975 mg at  04/12/25 1342    allopurinol (ZYLOPRIM) tablet 100 mg, 100 mg, Oral, Daily, Damian Cerda MD, 100 mg at 04/12/25 1343    apixaban (ELIQUIS) tablet 5 mg, 5 mg, Oral, BID, Damian Cerda MD, 5 mg at 04/12/25 1726    atorvastatin (LIPITOR) tablet 80 mg, 80 mg, Oral, Daily With Dinner, Damian Cerda MD, 80 mg at 04/12/25 1622    epoetin jessica (EPOGEN,PROCRIT) injection 3,000 Units, 3,000 Units, Intravenous, After Dialysis, 3,000 Units at 04/12/25 1031 **AND** epoetin jessica (EPOGEN,PROCRIT) injection 2,000 Units, 2,000 Units, Intravenous, After Dialysis, Damian Cerda MD, 2,000 Units at 04/12/25 1031    [START ON 4/14/2025] gabapentin (NEURONTIN) capsule 100 mg, 100 mg, Oral, Once per day on Monday Wednesday Friday, Damian Cerda MD    lidocaine (LIDODERM) 5 % patch 1 patch, 1 patch, Topical, Daily PRN, Damian Cerda MD    methocarbamol (ROBAXIN) tablet 750 mg, 750 mg, Oral, Q6H KEMAL, Damian Cerda MD, 750 mg at 04/12/25 1726    naloxone (NARCAN) 0.04 mg/mL syringe 0.04 mg, 0.04 mg, Intravenous, Q1MIN PRN, Damian Cerda MD    NIFEdipine (PROCARDIA XL) 24 hr tablet 30 mg, 30 mg, Oral, After Dinner, Damian Cerda MD, 30 mg at 04/12/25 1726    oxyCODONE (ROXICODONE) IR tablet 5 mg, 5 mg, Oral, Q4H PRN **OR** oxyCODONE (ROXICODONE) immediate release tablet 10 mg, 10 mg, Oral, Q4H PRN, Damian Cerda MD, 10 mg at 04/12/25 1622    senna-docusate sodium (SENOKOT S) 8.6-50 mg per tablet 1 tablet, 1 tablet, Oral, HS, Damian Cerda MD, 1 tablet at 04/11/25 2202    sevelamer (RENAGEL) tablet 1,600 mg, 1,600 mg, Oral, TID With Meals, Damian Cerda MD, 1,600 mg at 04/12/25 1622    Allergies:   No Known Allergies     Social History:    Social History     Socioeconomic History    Marital status:      Spouse name: None    Number of children: None    Years of education: None    Highest education level: None   Occupational History    None   Tobacco Use    Smoking status: Never     Passive exposure: Never    Smokeless  tobacco: Never   Vaping Use    Vaping status: Never Used   Substance and Sexual Activity    Alcohol use: Not Currently     Alcohol/week: 0.0 standard drinks of alcohol     Comment: 0    Drug use: Never    Sexual activity: None   Other Topics Concern    None   Social History Narrative    None     Social Drivers of Health     Financial Resource Strain: Not on file   Food Insecurity: No Food Insecurity (2025)    Nursing - Inadequate Food Risk Classification     Worried About Running Out of Food in the Last Year: Never true     Ran Out of Food in the Last Year: Never true     Ran Out of Food in the Last Year: Never true   Recent Concern: Food Insecurity - Food Insecurity Present (2025)    Nursing - Inadequate Food Risk Classification     Worried About Running Out of Food in the Last Year: Never true     Ran Out of Food in the Last Year: Never true     Ran Out of Food in the Last Year: Sometimes true   Transportation Needs: No Transportation Needs (2025)    Nursing - Transportation Risk Classification     Lack of Transportation: Not on file     Lack of Transportation: No   Physical Activity: Not on file   Stress: Not on file   Social Connections: Not on file   Intimate Partner Violence: Unknown (2025)    Nursing IPS     Feels Physically and Emotionally Safe: Not on file     Physically Hurt by Someone: Not on file     Humiliated or Emotionally Abused by Someone: Not on file     Physically Hurt by Someone: No     Hurt or Threatened by Someone: No   Housing Stability: Unknown (2025)    Nursing: Inadequate Housing Risk Classification     Has Housing: Not on file     Worried About Losing Housing: Not on file     Unable to Get Utilities: Not on file     Unable to Pay for Housing in the Last Year: No     Has Housin      Naresh Carpio Lives with: lives alone.  He lives in a(n) apartment  The living area: can live on one level  There is a flight of stairs to enter the home.    Patient/family's goals:  Return to previous home/apartment.  The patient will not have 24 hour ARC Supervision/physical assistance: supervision/physical assistance available upon discharge.    Review of Systems: A 10-point review of systems was performed. Negative except as listed above.     Physical Exam:  Vital Signs:      Temp:  [97.5 °F (36.4 °C)-98 °F (36.7 °C)] 97.7 °F (36.5 °C)  HR:  [60-71] 68  BP: (136-176)/(58-88) 136/58  Resp:  [17-20] 18  SpO2:  [98 %-100 %] 98 %  O2 Device: None (Room air)   Intake/Output Summary (Last 24 hours) at 4/12/2025 1938  Last data filed at 4/12/2025 1250  Gross per 24 hour   Intake 450 ml   Output 2501 ml   Net -2051 ml        Laboratory:      Lab Results   Component Value Date    HGB 7.4 (L) 04/11/2025    HCT 25.0 (L) 04/11/2025    WBC 7.55 04/11/2025     Lab Results   Component Value Date    BUN 30 (H) 04/11/2025    K 4.4 04/11/2025    CL 96 04/11/2025    CREATININE 4.63 (H) 04/11/2025     Lab Results   Component Value Date    PROTIME 15.4 (H) 04/07/2025    INR 1.20 (H) 04/07/2025        GEN: Patient seen resting comfortably in bedside chair, NAD  HEENT: NCAT; mucous membranes moist  CV: There is significant 2-3+ pitting edema in the BLEs to the proximal tibial level, with chronic-appearing overlying stasis changes  PULM: Breathing comfortably on room air  ABD: Non-distended  SKIN: No obvious rashes or lesions on exposed skin.  NEURO:  Awake and alert, answering questions appropriately to context; able to follow multi-step commands with clear consistency  Speech is fluent and organized  CN II-XII appear grossly intact  MMT (R/L): B 5/5; T 5/5; WE 5/5; FAbd 5/5; FF 5/5; HF 5/5; KE 5/5; DF 5/5; PF 5/5    Imaging: Reviewed     IR embolization (specify vessel or site)  Result Date: 4/9/2025  Impression: Impression: Pelvic angiogram showed pseudoaneurysms present at distal branches of the left inferior gluteal artery. The artery was embolized using coils and gelfoam. Workstation performed: [id number]       CTA abdomen pelvis w wo contrast  Result Date: 4/5/2025  Impression: Grossly unchanged size of a right retroperitoneal and left gluteal muscle hematoma status post IR embolization. Infiltrative blood products now within the mid pelvis are likely due to redistribution. No active extravasation identified. 5 mm pseudoaneurysm within the left gluteal musculature. Recommend IR consultation. Small right pleural effusion, increased from prior. Unchanged appearance of an L2 vertebral body fracture. The study was marked in EPIC for immediate notification. Workstation performed: [id number]      IR embolization (specify vessel or site)  Result Date: 4/3/2025  Impression: Impression: Embolization of the distal right deep circumflex iliac artery and distal right superior gluteal artery both of which supply an area of active extravasation. Workstation performed: [id number]     Assessment and Recommendations:  PM&R consulted for rehabilitation recommendations.     * Traumatic retroperitoneal hematoma  Assessment & Plan  - Sustained from mechanical fall several days prior to initial presentation on 4/2/25, presenting with progressively worsening low back pain. Trauma imaging revealed a large retroperitoneal hematoma with active extravasation and hemoglobin drop. Now s/p transfusion, Kcentra and ultimately IR embolization of the right deep circumflex iliac artery and superior gluteal artery with Dr. Garcia on 4/3/25. Unfortunately, on 4/6/25, his hemoglobin decreased and repeat CT showed a 5 mm inferior gluteal artery branch pseudoaneurysm and he is s/p IR pelvic angiogram on 4/7/25 with Dr. Hernandez, which demonstrated pseudoaneurysms at the distal branches of the left inferior gluteal artery which was embolized.   - Continue to trend Hgb on CBC closely, now back on anticoagulation  - Contact IR if there are additional bleeding concerns given multiple prior interventions described above  - Local right hip/groin access site  "incision care  - Analgesia, see below  - PT and OT        Impaired mobility and activities of daily living  Assessment & Plan  - Rehabilitation medicine physician for daily monitoring of care, 24 hour availability for acute medical issues, medication management, and therapeutic and diagnostic assessments.  - 24 hour rehabilitation nursing 7 days per week for: management/teaching of medications, bowel/bladder routine, skin care.  - PT, OT for 2-3 hours per day, 5 to 6 days per week; 15 hours per week  - Rehabilitation Psychology as needed for adjustment and coping  - CM for barriers to discharge, community resources, and family support  - Discharge planning following to help ensure a safe and efficient discharge  - 900/7 program due to HD status, pain, expected therapy tolerance      At high risk for skin breakdown  Assessment & Plan  - Moisturize skin daily with skin nourishing cream  - Ehob cushion in chair when out of bed.  - Preventative hydraguard to bilateral heels BID and PRN.   - Calazime paste to sacrum/buttocks BID and PRN  - Monitor clinically for breakdown, frequent turns  - Wound care RN consulted and following      Wound of skin  Assessment & Plan  - Has right anterior pre-tibial scab, wash gently daily, leave open to air  - Wound care RN consulted and following    Hyperphosphatemia  Assessment & Plan  - Management per nephrology  - Continue phosphate binders    Visual disturbance  Assessment & Plan  - Reporting \"floater\" within the right eye with acute onset on 4/11/25 at mid-day while watching TV; describes a dark, \"spider\"-shaped floater that occupies a significant portion of the visual field, painless; has similar floaters in the left eye but smaller  - Ophthalmology consulted on [facility] admission by IM, per prior trauma surgery documentation, Dr. Chaudhry of ophthalmology was made personally aware of clinical situation on 4/11/25 prior to [facility] admission  - Ophthalmology consulted and pending    Acute " pain due to trauma  Assessment & Plan  - Evaluated by APS on acute care  - Tylenol 975 mg q8h scheduled  - Gabapentin 100 mg three times weekly  - Methocarbamol 750 mg q6h scheduled  - Lidocaine patches daily PRN  - Oxycodone 5 or 10 mg q4h PRN, wean as possible    L2 vertebral fracture (HCC)  Assessment & Plan  - Sustained from a mechanical fall on 3/24/25, found to have an acute, obliquely oriented fracture of the L2 vertebral body with distraction of the dominant fracture fragment and involvement of the anterior and posterior cortices and superior endplate  - At that time, managed non-operatively with LSO bracing, though it does not appear orthopedic spine surgery or neurosurgery evaluated at the time of injury  - Maintain LSO brace with lumbar spinal precautions for now  - Should follow-up with spine surgery    Gout  Assessment & Plan  - Allopurinol for flare prophylaxis    Closed fracture of proximal end of left humerus with routine healing  Assessment & Plan  - Sustained from fall in early February, per CT of the LUE on 2/9/25, there was a minimally displaced fracture of the anterior facet of the greater tuberosity of the left humerus, minimally apparent on repeat x-ray of the shoulder on 3/24/25  - He was evaluated by orthopedic surgery at the time of the initial injury and was recommended to maintain NWB through the LUE with a sling  - There is no further orthopedic evaluation documentation and it does not appear that further follow-up occurred.  - Clinically, he is not describing ongoing significant left arm or shoulder pain and has been weight-bearing through the LUE  - Will plan to repeat humerus x-ray on 4/13/25 and re-consult orthopedic surgery for definitive follow-up when imaging is available    Primary hypertension  Assessment & Plan  - Nifedipine  - Appreciate IM and nephrology management    Chronic diastolic (congestive) heart failure (HCC)  Assessment & Plan  Wt Readings from Last 3 Encounters:  "  04/12/25 98.9 kg (218 lb)   04/11/25 98.5 kg (217 lb 2.5 oz)   03/24/25 107 kg (234 lb 12.6 oz)     - Most recent TTE from 10/9/24 with EF 60-65%, grade 1 diastolic dysfunction  - Volume management with HD per nephrology  - Monitor weights  - Appreciate medicine and nephrology recommendations and management          Paroxysmal atrial fibrillation (HCC)  Assessment & Plan  - Currently rate controlled  - Embolic stroke risk reduction with apixaban 5 mg BID    ESRD (end stage renal disease) (HCC)  Assessment & Plan  - Continue HD  - Nephrology consulted and following, appreciate their recommendations    Hyponatremia  Assessment & Plan  - Trend on BMP  - Electrolyte management per nephrology  - Continue HD in setting of ESRD    At risk for constipation  Assessment & Plan  - Monitor closely for constipation and/or incontinence. Will follow BMs and adjust bowel regimen to target soft, formed stools q1-2 days, per pt's regular schedule.      Diabetic ulcer of left midfoot associated with type 2 diabetes mellitus, limited to breakdown of skin ([facility])  Assessment & Plan  - Has chronic wounds affecting the left foot plantar surface, left foot 1st and 2nd digits  - For left medial foot, left plantar surface: cleanse with NSS and pat dry. Apply dermagran to plantar wound only. Cover both wounds with silicone bordered foam dressings. Naren with T for Treatment and change every other day or PRN  - Pain left foot 1st and 2nd digits with betadine daily  - See \"at risk for skin breakdown\" for preventive care  - Should follow-up closely with podiatry in the outpatient setting after ARC discharge, last outpatient documentation appears to be from 2023      Anemia due to chronic kidney disease, on chronic dialysis ([facility])  Assessment & Plan  - Trend Hgb on CBC  - Epoetin jessica with dialysis  - Transfusions per IM, appreciate their recommendations    At risk for venous thromboembolism (VTE)  Assessment & Plan  - Apixaban, see " above    #Disposition: Pending initial interdisciplinary team meeting, home alone, ELOS 10-14 days    I have spent a total time of 95 minutes on 04/12/25 in caring for this patient including Diagnostic results, Prognosis, Risks and benefits of tx options, Instructions for management, Patient and family education, Importance of tx compliance, Risk factor reductions, Impressions, Counseling / Coordination of care, Documenting in the medical record, Reviewing/placing orders in the medical record (including tests, medications, and/or procedures), Obtaining or reviewing history  , and Communicating with other healthcare professionals .    Jayden Rowland MD  Attending Physician  Physical Medicine and Rehabilitation  [facility]

## 2025-04-12 NOTE — TREATMENT PLAN
Individualized Plan of Care - [facility]  Naresh Carpio 64 y.o. male MRN: [id number]  Unit/Bed#: -01 Encounter: [id number]     PATIENT INFORMATION  ADMISSION DATE: 4/11/2025  6:19 PM AYAAN CATEGORY:13 Other Disabling Impairments    ADMISSION DIAGNOSIS: Traumatic retroperitoneal hemorrhage [S36.899A]  EXPECTED LOS: 10-14 days     MEDICAL/FUNCTIONAL PROGNOSIS  Based on my assessment of the patient's medical conditions and current functional status, the prognosis for attaining medical and functional goals or the IRF stay is:  Fair    Medical Goals: Patient will be medically [facility] completion of [facility]hab program and Patient will be able to manage medical conditions and comorbid conditions with medications and follow up upon completion of rehab program    ANTICIPATED DISCHARGE DISPOSITION AND SERVICES  COMMUNITY SETTING: Home - alone    Is a 24-hr caregiver available? No    ANTICIPATED FOLLOW-UP SERVICE:   Home Health Services: PT, OT, and Nursing    DISCIPLINE SPECIFIC PLANS:  Required Disciplines & Services: Rehabillitation Nursing, Case Management, Dietay/Nutrition, and Psychology    REQUIRED THERAPY:  Therapy Hours per Day Days per Week Total Days   Physical Therapy 1.5 5-6 10-14   Occupational Therapy 1.5 5-6 10-14   NOTE: Additional therapy time(s) may be added as appropriate to meet patient needs and to achieve functional goals.    ANTICIPATED FUNCTIONAL OUTCOMES:  ADL: Patient will be independent with ADLs with least restrictive device upon completion of rehab program   Bladder/Bowel:  Modified independent with the least restrictive device   Transfers: Patient will be independent with transfers with least restrictive device upon completion of rehab program   Locomotion: Patient will be independent with locomotion with least restrictive device upon completion of rehab program   Cognitive:  Modified independent with the least restrictive  device     DISCHARGE PLANNING NEEDS  Equipment needs: Discharge needs to be reviewed with team    REHAB ANTICIPATED PARTICIPATION RESTRICTIONS:  Lumbar spine precautions, LSO brace when OOB    Jayden Rowland MD  Attending Physician  Physical Medicine and Rehabilitation  [facility]

## 2025-04-12 NOTE — ASSESSMENT & PLAN NOTE
"Reports a right eye \"floater\" earlier in his hospitalization which has resolved but continues to have blurry central vision.  Denies eye pain  Ophthalmology consulted for eye examination  "

## 2025-04-12 NOTE — PROGRESS NOTES
[facility] PT Evaluation Goals     04/12/25 1500   Rehab Team Goals   Transfer Team Goal Patient will be independent with transfers with least restrictive device upon completion of rehab program   Locomotion Team Goal Patient will be independent with locomotion with least restrictive device upon completion of rehab program   Rehab Team Interventions   PT Interventions Gait Training;Therapeutic Exercise;Neuromuscualr Reeducation;Transfer Training;Bed Mobility;Patient/Family Education;Modalities   PT Transfer Goal   Roll left and right Goal 06. Independent - Patient completes the activity by him/herself with no assistance from a helper.   Sit to lying Goal 06. Independent - Patient completes the activity by him/herself with no assistance from a helper.   Lying to sitting on side of bed Goal 06. Independent - Patient completes the activity by him/herself with no assistance from a helper.   Sit to stand Goal 06. Independent - Patient completes the activity by him/herself with no assistance from a helper.   Chair/bed-to-chair transfer Goal 06. Independent - Patient completes the activity by him/herself with no assistance from a helper.   Car Transfer Goal 06. Independent - Patient completes the activity by him/herself with no assistance from a helper.   Assistive Device   (LRAD)   Environment Level Surface;Well Lit   Status Ongoing;Target goal - two weeks   Locomotion Goal   Primary discharge mode of locomotion Walking   Target Walk Distance 300 ft   Assist Device   (LRAD)   Gait Pattern Improvement Excess Slow;Improper weight shift   Environment Level Surface;Well Lit   Walk 10 feet Goal 06. Independent - Patient completes the activity by him/herself with no assistance from a helper.   Walk 50 feet with 2 turns Goal 06. Independent - Patient completes the activity by him/herself with no assistance from a helper.   Walk 150 feet Goal 06. Independent - Patient completes the activity by him/herself with no assistance from a  helper.   Walking 10 feet on uneven surface 06. Independent - Patient completes the activity by him/herself with no assistance from a helper.   Walking Goal Status Ongoing;Target goal - two weeks   Wheel 50 feet with 2 turns Goal 09. Not applicable   Wheel 150 feet Goal 09. Not applicable   Stairs Goal   1 step or curb goal 06. Independent - Patient completes the activity by him/herself with no assistance from a helper.   4 steps Goal 06. Independent - Patient completes the activity by him/herself with no assistance from a helper.   12 steps Goal 06. Independent - Patient completes the activity by him/herself with no assistance from a helper.   Assist Level Moderate [place]   Number of Stairs 12   Technique Non-reciprocal   Hand Rail   (unilateral)   Status Ongoing;Target goal - two weeks   Object Retrieval Goal   Picking up object Goal 06. Independent - Patient completes the activity by him/herself with no assistance from a helper.   Assistive Device  Reacher   Small Object Picked Up marker     Maxine Umaña, PT, DPT

## 2025-04-12 NOTE — ASSESSMENT & PLAN NOTE
Wt Readings from Last 3 Encounters:   04/14/25 99.1 kg (218 lb 6.4 oz)   04/11/25 98.5 kg (217 lb 2.5 oz)   03/24/25 107 kg (234 lb 12.6 oz)     - Most recent TTE from 10/9/24 with EF 60-65%, grade 1 diastolic dysfunction  - Volume management with HD per nephrology  - Monitor weights  - Appreciate medicine and nephrology recommendations and management

## 2025-04-12 NOTE — ASSESSMENT & PLAN NOTE
Pt was admitted to the trauma service and found to have L2 vertebral body and retroperitoneal hematoma.  He was admitted to the trauma service who administered multiple units of PRBCs and had IR embolization of the distal right deep circumflex iliac artery and distal right superior gluteal artery both of which supply an area of active extravasation and then pseudoaneurysms present at distal branches of the left inferior gluteal artery. The artery was embolized using coils and gelfoam.   He stabilized and was discharged to [facility] for rehabilitation  Monitor hemoglobin with Eliquis use  Now admitted to [facility] for rehab

## 2025-04-12 NOTE — ASSESSMENT & PLAN NOTE
- Sustained from a mechanical fall on 3/24/25, found to have an acute, obliquely oriented fracture of the L2 vertebral body with distraction of the dominant fracture fragment and involvement of the anterior and posterior cortices and superior endplate  - At that time, managed non-operatively with LSO bracing, though it does not appear orthopedic spine surgery or neurosurgery evaluated at the time of injury  - Maintain LSO brace with lumbar spinal precautions for now  - Should follow-up with spine surgery

## 2025-04-12 NOTE — ASSESSMENT & PLAN NOTE
- Sustained from fall in early February, per CT of the LUE on 2/9/25, there was a minimally displaced fracture of the anterior facet of the greater tuberosity of the left humerus, minimally apparent on repeat x-ray of the shoulder on 3/24/25  - He was evaluated by orthopedic surgery at the time of the initial injury and was recommended to maintain NWB through the LUE with a sling  - There is no further orthopedic evaluation documentation and it does not appear that further follow-up occurred.  - Clinically, he is not describing ongoing significant left arm or shoulder pain and has been weight-bearing through the LUE  > 4/14: Repeat humerus X-ray done yesterday shows no change in fracture alignment. Ortho consulted.  - Monitor for signs of worsening pain

## 2025-04-12 NOTE — PROGRESS NOTES
Physical Therapy Evaluation    Patient's Name: Naresh Carpio    Admitting Diagnosis  Traumatic retroperitoneal hemorrhage [S36.650Q]    Problem List  Patient Active Problem List   Diagnosis    Anemia due to chronic kidney disease, on chronic dialysis ([facility])    History of prediabetes    Hypertension    History of TIA (transient ischemic attack)    T10 vertebral fracture ([facility])    Diarrhea    Hypervolemia    Hemodialysis status ([facility])    Left hip pain    Severe Left Orchalgia    Right shoulder pain    Osteoarthritis of left hip    Status post fall    Traumatic rhabdomyolysis ([facility])    Open wound of right foot    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 mellitus, with bone involvement without evidence of necrosis (HCC)    Diabetic ulcer of left midfoot associated with type 2 diabetes mellitus, limited to breakdown of skin (HCC)    Diabetic polyneuropathy associated with type 2 diabetes mellitus ([facility])    Diabetes mellitus type 2 with peripheral artery disease ([facility])    History of amputation of lesser toe of left foot ([facility])    Onychomycosis    Subacute osteomyelitis of right foot ([facility])    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 mellitus, with necrosis of bone ([facility])    Acquired deformity of foot, right    Charcot's joint    Polymicrobial bacterial infection    Cellulitis of right foot    Constipation    Facial cellulitis    History of amputation of hallux ([facility])    Difficulty with speech    Hyperkalemia    Closed fracture of right pelvis (HCC)    Bilateral sacral fracture, closed (HCC)    Falls    PAD (peripheral artery disease) ([facility])    Acute on chronic anemia    Hyponatremia    Nausea    ESRD (end stage renal disease) ([facility])    Paroxysmal atrial fibrillation (HCC)    Fall    Melena    Chronic diastolic (congestive) heart failure ([facility])    Pleural effusion    Elevated troponin    Chronic wound    Renal lesion    ESRD (end stage renal disease) on dialysis (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, on chronic dialysis (HCC)    Left shoulder pain    Closed fracture of proximal end of left humerus with routine healing    Gout    Generalized weakness    Disorder of acid-base balance    L2 vertebral fracture (HCC)    Non-compliance with treatment    Multiple open wounds of foot    Traumatic retroperitoneal hemorrhage    Secondary hyperparathyroidism (HCC)    At risk for electrolyte imbalance    ESRD on dialysis ([facility])    Wounds, multiple    Acute pain due to trauma    Pseudoaneurysm ([facility])    Visual disturbance    Hyperphosphatemia       Past Medical History  Past Medical History:   Diagnosis Date    Acute cystitis 10/18/2024    Acute on chronic anemia 01/23/2022    Atrial fibrillation ([facility])     Bradycardia 09/05/2023    Diabetes mellitus (HCC)     ESRD (end stage renal disease) on dialysis ([facility])     Hematuria 10/10/2024    Hematuria 10/10/2024    Hyperkalemia 04/06/2024    Hyperkalemia 04/06/2024    Hyperlipidemia     Hypertension     Left toe amputee ([facility])     TIA (transient ischemic attack)     Toxic metabolic encephalopathy 10/08/2024       Past Surgical History  Past Surgical History:   Procedure Laterality Date    AMPUTATION Left     left foot resection 10 years ago dr tran    HEMODIALYSIS ADULT  1/18/2025    HEMODIALYSIS ADULT  2/27/2025    IR EMBOLIZATION (SPECIFY VESSEL OR SITE)  4/3/2025    IR EMBOLIZATION (SPECIFY VESSEL OR SITE)  4/7/2025    IR LOWER EXTREMITY ANGIOGRAM  08/29/2023    IR TEMPORARY DIALYSIS CATHETER PLACEMENT  08/25/2023    IR TUNNELED CENTRAL LINE REMOVAL  08/23/2023    IR TUNNELED DIALYSIS CATHETER PLACEMENT  01/27/2022    IR TUNNELED DIALYSIS CATHETER PLACEMENT  08/30/2023    IA AMPUTATION METATARSAL W/TOE SINGLE Right 8/31/2023    Procedure: RIGHT PARTIAL 1ST RAY RESECTION WITH  WOUND VAC APPLICATION;  Surgeon: Won Parmar  EUGENIA;  Location: [facility];  Service: Podiatry        04/12/25 1500   Patient Data   Rehab Impairment Impairment of mobility, safety and Activities of Daily Living (ADLs) due to Other Disabling Impairments: 13 Other Disabling Impairments   Etiologic Diagnosis Traumatic retroperitoneal hemorrhage   Date of Onset 04/03/25   Support System   Name Alicja Antoine sister   Able to provide 24 hour supervision No   Able to provide physical help? No   Home Setup   Type of Home Apartment   Method of Entry Stairs  (1 HR)   Number of Stairs 6   Number of Stairs in Home 0   First Floor Bathroom Full;Tub;Shower   First Floor Bathroom Accessibility Commode   Available Equipment Roller Walker;Bedside Commode   Baseline Information   Transportation    Prior Device(s) Used Roller Walker  (RW in the community at true baseline)   Prior IADL Participation   Money Management Identify Money;Estimate Costs;Estimate Change;Combine Bills;Manage Checkbook   Meal Preparation Full Participation   Laundry Full Participation   Home Cleaning Full Participation   Prior Level of Function   Self-Care 3. Independent - Patient completed the activities by him/herself, with or without an assistive device, with no assistance from a helper.   Indoor-Mobility (Ambulation) 3. Independent - Patient completed the activities by him/herself, with or without an assistive device, with no assistance from a helper.   Stairs 3. Independent - Patient completed the activities by him/herself, with or without an assistive device, with no assistance from a helper.   Functional Cognition 3. Independent - Patient completed the activities by him/herself, with or without an assistive device, with no assistance from a helper.   Prior Device Used D. Walker  (only in the community)   Falls in the Last Year   Number of falls in the past 12 months 1   Type of Injury Associated with Fall Major injury   Patient Preference   Nickname (Patient Preference)  "Edward   Psychosocial   Patient Behaviors/Mood Flat affect;Withdrawn;Irritable   Restrictions/Precautions   Precautions (S)  Bed/chair alarms;Cognitive;Fall Risk;Pain;Supervision on toilet/commode;Spinal precautions;Visual deficit  (reports baseline generally poor vision in L eye, new-onset R floater that turned into blurry central vision, opthamology consult pending; behavior)   Weight Bearing Restrictions No   Braces or Orthoses LSO  (OOB - instructed pt in adjusting for correct positioning)   Pain Assessment   Pain Assessment Tool 0-10   Pain Score 4   Pain Location/Orientation Orientation: Right;Location: Back   Hospital Pain Intervention(s)   (offered CP at end of session but pt declined.)   Roll Left and Right   Reason if not Attempted (S)  Environmental limitations  (time constraints - please assess next session)   Roll Left and Right CARE Score 10   Sit to Lying   Reason if not Attempted (S)  Environmental limitations  (time constraints - please assess next session)   Sit to Lying CARE Score 10   Lying to Sitting on Side of Bed   Reason if not Attempted (S)  Environmental limitations  (time constraints - please assess next session)   Lying to Sitting on Side of Bed CARE Score 10   Sit to Stand   Type of Assistance Needed Physical assistance;Verbal cues   Physical Assistance Level 26%-50%   Comment RW (from recliner, W/C, + chair in PT gym)   Sit to Stand CARE Score 3   Picking Up Object   Type of Assistance Needed Physical assistance;Verbal cues   Physical Assistance Level 26%-50%   Comment Instructed pt in use of reacher as he has BLT pxn (pt w/ good recall of pxn, just went over them w/ OT); pt w/ difficulty manipulating reacher (also reports difficulty seeing marker on floor due to impaired vision).   Picking Up Object CARE Score 3   Car Transfer   Comment Pt sat down on side of seat w/ cushion; refused to turn into car, stating, \"It hurts too much!\"   Reason if not Attempted Refused to perform   Car " Transfer CARE Score 7   Ambulation   Primary Mode of Locomotion Prior to Admission Walk   Distance Walked (feet) 50 ft  (+ 40' + 90')   Assist Device Roller Walker   Gait Pattern Decreased foot clearance;L knee hyperextension;Forward Flexion;Improper weight shift   Limitations Noted In Balance;Endurance;Heel Strike;Speed;Strength;Swing   Provided Assistance with: Balance   Walk Assist Level Minimum Assist;Chair Follow   Walk 10 Feet   Type of Assistance Needed Physical assistance;Verbal cues   Physical Assistance Level 25% or less   Comment RW   Walk 10 Feet CARE Score 3   Walk 50 Feet with Two Turns   Type of Assistance Needed Physical assistance;Verbal cues   Physical Assistance Level Total assistance   Comment MinAx1 + chair follow   Walk 50 Feet with Two Turns CARE Score 1   Walk 150 Feet   Reason if not Attempted Safety concerns   Walk 150 Feet CARE Score 88   Walking 10 Feet on Uneven Surfaces   Type of Assistance Needed Physical assistance;Verbal cues   Physical Assistance Level 25% or less   Comment MinAx1 w/ RW   Walking 10 Feet on Uneven Surfaces CARE Score 3   Wheel 50 Feet with Two Turns   Reason if not Attempted Activity not applicable   Wheel 50 Feet with Two Turns CARE Score 9   Wheel 150 Feet   Reason if not Attempted Activity not applicable   Wheel 150 Feet CARE Score 9   Curb or Single Stair   Style negotiated Curb   Type of Assistance Needed Physical assistance;Verbal cues   Physical Assistance Level Total assistance   Comment ModAx1 w/ RW + SBA of 2nd for safety; cues for sequencing   1 Step (Curb) CARE Score 1   4 Steps   Reason if not Attempted Refused to perform   4 Steps CARE Score 7   Stairs   Type Stairs   Findings up/down 2 stairs ascending forwards/descending forwards w/ ModA + up/down 2 stairs w/ B HR ascending forwards/descending backwards w/ improved safety; seated rest in b/w due to behavior.   Strength RLE   RLE Overall Strength 4/5   Strength LLE   LLE Overall Strength 4/5  "  Sensation   Light Touch No apparent deficits   Cognition   Arousal/Participation Alert   Attention Attends with cues to redirect   Orientation Level Oriented X4   Following Commands Follows multistep commands with increased time or repetition   Comments irritable but cooperative w/ encouragement   Objective Measure   PT Measure(s) (S)  no outcome measures this session due to time constraints - please do TUG tomorrow   Discharge Information   Barriers to Return to Vocation Environmental Access;Strength;Endurance   Patient's Discharge Plan \"go home\"   Patient's Rehab Expectations be independent   Barriers to Discharge Home Limited Family Support;Decreased Strength;Decreased Endurance;Pain   Impressions Pt is 64 y.o. male seen for a PT evaluation s/p admit to [facility] on 4/11/2025. Pt admitted to [facility] w/ a large retroperitoneal hematoma, underwent embolization w/ IR. Of note pt admitted to [facility] from [facility] (only at STR a few days) s/p fall w/ L2 fx (LSO OOB). Please see above for other active problem list / PMH.     PT deemed to be appropriate to tolerate 3 hours of intensive therapy in acute rehab setting.     At true baseline pt was I w/ ambulation w/o AD for household mobility, Judi w/ RW for community mobility, lives alone in an apt w/ NAZARIO.     Currently pt requires Min/ModA for functional transfers; Marvin for ambulation w/ RW; ModA for stair negotiation.     Pt presents functioning below baseline and w/ overall mobility deficits 2* to: decreased LE strength; generalized weakness/deconditioning; decreased endurance; impaired balance; gait deviations; pain; fatigue; impaired vision; bed/chair alarms. These impairments place pt at risk for falls.     Pt will continue to benefit from skilled PT interventions to address stated impairments; to maximize functional potential; for ongoing pt/ family training; and DME needs.     Anticipate a 2 week rehab stay with goal of discharging to home at a Judi " level.     Anticipate recommending home health physical therapy, use of RW for mobility, and support from family prn.   PT Therapy Minutes   PT Time In 1500   PT Time Out 1530   PT Total Time (minutes) 30   PT Mode of treatment - Individual (minutes) 30   PT Mode of treatment - Concurrent (minutes) 0   PT Mode of treatment - Group (minutes) 0   PT Mode of treatment - Co-treat (minutes) 0   PT Mode of Treatment - Total time(minutes) 30 minutes   PT Cumulative Minutes 30   Cumulative Minutes   Cumulative therapy minutes 60     Maxine Umaña, PT, DPT

## 2025-04-12 NOTE — ASSESSMENT & PLAN NOTE
- TTS @ [facility]  - access: right IJ perm cath  - EDW 99kg previously, last HD post weight 98.6kg  - challenge weight as tolerated, weight should be around 96.5kg after HD today if able to fully UF to goal   - HD today    HEMODIALYSIS PROCEDURE NOTE  The patient was seen and examined on hemodialysis.  Tolerating well.  BP acceptable.  Time: 4 hours  Sodium: 138 Blood flow: 400   Dialyzer: F160 Potassium: 3 Dialysate flow: 500   Access: cvc Bicarbonate: 35 Ultrafiltration goal: 3500   Medications on HD: SURINDER

## 2025-04-12 NOTE — ASSESSMENT & PLAN NOTE
> 4/14: Na 129 - A&O x3, pt mentating appropriately    - Trend on BMP  - Electrolyte management per nephrology  - Continue HD in setting of ESRD

## 2025-04-12 NOTE — ASSESSMENT & PLAN NOTE
- Has right anterior pre-tibial scab, wash gently daily, leave open to air  - Wound care RN consulted and following

## 2025-04-12 NOTE — ASSESSMENT & PLAN NOTE
"- Has chronic wounds affecting the left foot plantar surface, left foot 1st and 2nd digits  - For left medial foot, left plantar surface: cleanse with NSS and pat dry. Apply dermagran to plantar wound only. Cover both wounds with silicone bordered foam dressings. Naren with T for Treatment and change every other day or PRN  - Pain left foot 1st and 2nd digits with betadine daily  - See \"at risk for skin breakdown\" for preventive care  - Should follow-up closely with podiatry in the outpatient setting after [facility] discharge, last outpatient documentation appears to be from 2023  "

## 2025-04-12 NOTE — ASSESSMENT & PLAN NOTE
> Last BM 4/11  - Monitor closely for constipation and/or incontinence. Will follow BMs and adjust bowel regimen to target soft, formed stools q1-2 days, per pt's regular schedule.

## 2025-04-12 NOTE — PROGRESS NOTES
OT LTG     04/12/25 1410   Rehab Team Goals   ADL Team Goal Patient will be independent with ADLs with least restrictive device upon completion of rehab program   Rehab Team Interventions   OT Interventions Self Care;Home Management;Therapeutic Exercise;Community Reintegration;Energy Conservation   Eating Goal   Eating Goal 06. Independent - Patient completes the activity by him/herself with no assistance from a helper.   Status Target goal - two weeks   Interventions Optimal Position   Grooming Goal   Oral Hygiene Goal 06. Independent - Patient completes the activity by him/herself with no assistance from a helper.   Status Target goal - two weeks   Intervention Assistive Device;Balance Work;Therapeutic Exercise;Tolerance Work   Tub/Shower Transfer Goal   Method Other  (sponge bath)   Status Target goal - two weeks   Interventions ADL Training;Assistive Device;Neuromuscular Education   Bathing Goal   Shower/bathe self Goal 06. Independent - Patient completes the activity by him/herself with no assistance from a helper.   Status Target goal - two weeks   Intervention ADL Training;Assistive Device;Neuromuscular Education;Therapeutic Exercise   Upper Body Dressing Goal   Upper body dressing Goal 06. Independent - Patient completes the activity by him/herself with no assistance from a helper.   Status Target goal - two weeks   Intervention Assistive Device;Balance Work;Neuromuscular Education;Therapeutic Exercise;Tolerance Work   Lower Body Dressing Goal   Lower body dressing Goal 06. Independent - Patient completes the activity by him/herself with no assistance from a helper.   Putting on/taking off footwear Goal 06. Independent - Patient completes the activity by him/herself with no assistance from a helper.   Status Target goal - two weeks   Intervention Neuromuscular Education;Therapeutic Exercise;Tolerance Work;Balance Work;Assistive Device   Toileting Transfer Goal   Toilet transfer Goal 06. Independent - Patient  completes the activity by him/herself with no assistance from a helper.   Status Target goal - two weeks   Intervention ADL Training;Balance Work;Assistive Device   Toileting Goal   Toileting hygiene Goal 06. Independent - Patient completes the activity by him/herself with no assistance from a helper.   Status Target goal - two weeks   Intervention ADL Training;Balance Work;Assistive Device   Meal Prep and Kitchen Mobility   Assist Level Independent   Status Target goal - two weeks   Medication Management   Assist Level Independent   Status Target goal - two weeks   Laundry   Assist Level Independent   Status Target goal - two weeks   Finance Management   Assist Level Independent   Status Target goal - two weeks        No

## 2025-04-12 NOTE — ASSESSMENT & PLAN NOTE
Wt Readings from Last 3 Encounters:   04/12/25 98.9 kg (218 lb)   04/11/25 98.5 kg (217 lb 2.5 oz)   03/24/25 107 kg (234 lb 12.6 oz)   Appears euvolemic except for LE pitting edema which appears to be chronic  Renal diet  Monitor intake, output, daily weights  Volume management with HD

## 2025-04-13 ENCOUNTER — APPOINTMENT (INPATIENT)
Dept: RADIOLOGY | Facility: HOSPITAL | Age: 65
DRG: 939 | End: 2025-04-13
Payer: MEDICARE

## 2025-04-13 PROBLEM — G47.00 INSOMNIA: Status: ACTIVE | Noted: 2025-04-13

## 2025-04-13 LAB
HCT VFR BLD AUTO: 25.4 % (ref 36.5–49.3)
HGB BLD-MCNC: 8 G/DL (ref 12–17)

## 2025-04-13 PROCEDURE — 99232 SBSQ HOSP IP/OBS MODERATE 35: CPT | Performed by: PHYSICIAN ASSISTANT

## 2025-04-13 PROCEDURE — 73060 X-RAY EXAM OF HUMERUS: CPT

## 2025-04-13 PROCEDURE — 97110 THERAPEUTIC EXERCISES: CPT

## 2025-04-13 PROCEDURE — 85018 HEMOGLOBIN: CPT | Performed by: PHYSICAL MEDICINE & REHABILITATION

## 2025-04-13 PROCEDURE — 97535 SELF CARE MNGMENT TRAINING: CPT

## 2025-04-13 PROCEDURE — 99232 SBSQ HOSP IP/OBS MODERATE 35: CPT | Performed by: INTERNAL MEDICINE

## 2025-04-13 PROCEDURE — 85014 HEMATOCRIT: CPT | Performed by: PHYSICAL MEDICINE & REHABILITATION

## 2025-04-13 RX ORDER — METHOCARBAMOL 500 MG/1
1000 TABLET, FILM COATED ORAL EVERY 8 HOURS SCHEDULED
Status: DISCONTINUED | OUTPATIENT
Start: 2025-04-13 | End: 2025-05-07 | Stop reason: HOSPADM

## 2025-04-13 RX ORDER — OXYCODONE HYDROCHLORIDE 5 MG/1
5 TABLET ORAL ONCE
Status: COMPLETED | OUTPATIENT
Start: 2025-04-13 | End: 2025-04-13

## 2025-04-13 RX ADMIN — APIXABAN 5 MG: 5 TABLET, FILM COATED ORAL at 08:47

## 2025-04-13 RX ADMIN — APIXABAN 5 MG: 5 TABLET, FILM COATED ORAL at 16:54

## 2025-04-13 RX ADMIN — SENNOSIDES AND DOCUSATE SODIUM 1 TABLET: 50; 8.6 TABLET ORAL at 20:48

## 2025-04-13 RX ADMIN — OXYCODONE HYDROCHLORIDE 10 MG: 10 TABLET ORAL at 02:02

## 2025-04-13 RX ADMIN — METHOCARBAMOL 1000 MG: 500 TABLET ORAL at 20:44

## 2025-04-13 RX ADMIN — ACETAMINOPHEN 975 MG: 325 TABLET, FILM COATED ORAL at 13:11

## 2025-04-13 RX ADMIN — ALLOPURINOL 100 MG: 100 TABLET ORAL at 08:47

## 2025-04-13 RX ADMIN — OXYCODONE HYDROCHLORIDE 10 MG: 10 TABLET ORAL at 08:47

## 2025-04-13 RX ADMIN — ATORVASTATIN CALCIUM 80 MG: 80 TABLET, FILM COATED ORAL at 16:53

## 2025-04-13 RX ADMIN — Medication 6 MG: at 20:47

## 2025-04-13 RX ADMIN — SEVELAMER HYDROCHLORIDE 1600 MG: 800 TABLET ORAL at 16:53

## 2025-04-13 RX ADMIN — SEVELAMER HYDROCHLORIDE 1600 MG: 800 TABLET ORAL at 08:47

## 2025-04-13 RX ADMIN — OXYCODONE HYDROCHLORIDE 10 MG: 10 TABLET ORAL at 13:11

## 2025-04-13 RX ADMIN — ACETAMINOPHEN 975 MG: 325 TABLET, FILM COATED ORAL at 20:42

## 2025-04-13 RX ADMIN — NIFEDIPINE 30 MG: 30 TABLET, FILM COATED, EXTENDED RELEASE ORAL at 16:53

## 2025-04-13 RX ADMIN — LIDOCAINE 1 PATCH: 700 PATCH TOPICAL at 20:46

## 2025-04-13 RX ADMIN — METHOCARBAMOL 750 MG: 750 TABLET ORAL at 05:25

## 2025-04-13 RX ADMIN — OXYCODONE HYDROCHLORIDE 10 MG: 10 TABLET ORAL at 22:45

## 2025-04-13 RX ADMIN — METHOCARBAMOL 1000 MG: 500 TABLET ORAL at 11:14

## 2025-04-13 RX ADMIN — OXYCODONE HYDROCHLORIDE 10 MG: 10 TABLET ORAL at 17:31

## 2025-04-13 RX ADMIN — ACETAMINOPHEN 975 MG: 325 TABLET, FILM COATED ORAL at 05:25

## 2025-04-13 RX ADMIN — OXYCODONE HYDROCHLORIDE 5 MG: 5 TABLET ORAL at 11:14

## 2025-04-13 NOTE — PROGRESS NOTES
"OT daily tx note     04/13/25 7119   Pain Assessment   Pain Assessment Tool 0-10   Pain Score 8   Pain Location/Orientation Location: Back   Hospital Pain Intervention(s) Emotional support;Rest   Restrictions/Precautions   Precautions Bed/chair alarms;Cognitive;Fall Risk;Impulsive;Spinal precautions;Supervision on toilet/commode;Visual deficit  (diabetic retinopathy; behavior)   Braces or Orthoses LSO  (OOB - pt able to don w/ min A)   Lifestyle   Autonomy \"I can't stand all of you\"   Eating   Comment refused breaksfast when offered reporting poor appetitie due to pain/poor sleep   Shower/Bathe Self   Type of Assistance Needed Physical assistance   Physical Assistance Level 26%-50%   Comment completed SB seated in recliner and declined use of soap/water bucket and preferred wipes for bathing; pt able to wash 8/10 body parts in stance w/ min-mod A needed for steadying. TA needed for feet and lower legs due to lumbar spinal prec but did not wish to complete.   Shower/Bathe Self CARE Score 3   Upper Body Dressing   Type of Assistance Needed Physical assistance   Physical Assistance Level 26%-50%   Comment seated and able to don shirt OH/manage UB and min-mod A needed for LSO brace mgmt; pt able to doff brace w/ SUP and A needed to orient brace and retrieve small straps. pt prefers to have edu/min A w/ brace mgmt and wants to do it himself   Upper Body Dressing CARE Score 3   Lower Body Dressing   Type of Assistance Needed Physical assistance   Physical Assistance Level 51%-75%   Comment mod A needed to thread underwear due to diffculty utilizing crossed leg tech and dec functional reach due to prec; cm in stance w/ inc time   Lower Body Dressing CARE Score 2   Sit to Stand   Type of Assistance Needed Physical assistance   Physical Assistance Level 26%-50%   Comment w/ RW   Sit to Stand CARE Score 3   Cognition   Overall Cognitive Status Impaired   Arousal/Participation Alert   Attention Attends with cues to redirect "   Orientation Level Oriented X4   Memory Decreased recall of precautions   Following Commands Follows multistep commands with increased time or repetition   Comments dec frustration tolerance due to worsening vision and pain level   Vision   Vision Comments met w/ ophthalmologist yesterday and hoping to schedule apt following DC; cont worsening of R eye vision due to diabetic retinopathy   Activity Tolerance   Activity Tolerance Patient limited by pain;Patient limited by fatigue   Assessment   Treatment Assessment Pt engaged in skilled OT session with focus on ADL Retraining , LB Dressing, UB dressing, Standing tolerance, Standing balance , Energy conservation training/education, healthy coping education, Leisure and social pursuits, and community re-integration. Pt is limited by weakness, impaired balance, decreased endurance, increased fall risk, decreased ADLS, decreased IADLS, pain, decreased activity tolerance, decreased safety awareness, impaired judgement, and decreased cognition. Initially pt refused session and very agitated upon arrival. Pt reporting that he slept terrible and wanted more rest today. OT returned around 7:30 and pt requesting to SB but using wipes. Quick SB routine completed w/ some agitation due to pt's pain level. Pt then requesting to be left alone and offered breakfast to which he said no. Will f/u w/ pt later in AM to see if agreeable to complete more therapy. Behaviors logged on sleep log. OT services are warranted to address above barriers.   Prognosis Good   Problem List Decreased endurance;Impaired balance;Decreased mobility;Pain;Decreased safety awareness;Impaired judgement   Barriers to Discharge Inaccessible home environment;Decreased caregiver support   Plan   Treatment/Interventions Functional transfer training;ADL retraining;Therapeutic exercise;Endurance training   Progress Progressing toward goals   OT Therapy Minutes   OT Time In 0730   OT Time Out 0800   OT Total Time  (minutes) 30   OT Mode of treatment - Individual (minutes) 30   OT Mode of treatment - Concurrent (minutes) 0   OT Mode of treatment - Group (minutes) 0   OT Mode of treatment - Co-treat (minutes) 0   OT Mode of Treatment - Total time(minutes) 30 minutes   OT Cumulative Minutes 60

## 2025-04-13 NOTE — PROGRESS NOTES
"OT daily tx note     04/13/25 2230   Pain Assessment   Pain Assessment Tool 0-10   Pain Score 8   Pain Location/Orientation Location: Abdomen;Orientation: Lower;Location: Back   Hospital Pain Intervention(s) Emotional support;Rest   Restrictions/Precautions   Precautions Bed/chair alarms;Cognitive;Fall Risk;Supervision on toilet/commode;Spinal precautions;Visual deficit;Pain;Impulsive  (diabetic retinopathy; behavioral)   ROM Restrictions Yes  (lumbar spinal prec)   Braces or Orthoses LSO  (OOB)   Lifestyle   Autonomy \"thank you helping me\"   Eating   Type of Assistance Needed Set-up / clean-up   Physical Assistance Level No physical assistance   Comment seated in recliner finishing breakfast   Eating CARE Score 5   Sit to Stand   Type of Assistance Needed Physical assistance   Physical Assistance Level 26%-50%   Comment w/ RW   Sit to Stand CARE Score 3   Bed-Chair Transfer   Type of Assistance Needed Physical assistance   Physical Assistance Level 51%-75%   Comment mod A x1 w/ RW   Chair/Bed-to-Chair Transfer CARE Score 2   Exercise Tools   Exercise Tools Yes   UE Ergometer Pt engaged in UB strengthening using UE ergometer for 5 min prograde and 5 min retrograde with Good tolerance. Pt educated on energy conservation techniques and utilized rest breaks as needed. Pt benefits from activity to improve UB strengthening, activity tolerance, and endurance to facilitate independence w/ ADL/IADLs.   Cognition   Overall Cognitive Status Impaired   Arousal/Participation Alert   Attention Attends with cues to redirect   Orientation Level Oriented X4   Memory Decreased recall of precautions   Following Commands Follows multistep commands with increased time or repetition   Comments pt more pleasant later in AM and agreeable to participate for brief session. pt stating appreciation for help during session compared to agitated during early AM   Additional Activities   Additional Activities Other (Comment)   Additional " Activities Comments Pt engaged in community distance walking around unit using RW. Pt open to walking as she thinks the more walking he does, the better he will feel. Pt req mod A w/ RW and WCF and eventually only min A. Rest breaks needed t/o ambulation due to inc pain in abdomen and lower back. Pt able to walk room<>OT gym and benefits from cont community distance walking to improve endurance, activity tolerance, and simulate household distances in prep for home.   Activity Tolerance   Activity Tolerance Patient limited by pain;Patient limited by fatigue   Assessment   Treatment Assessment Pt engaged in skilled OT session with focus on Functional Transfers, Standing tolerance, Standing balance , Gross motor strengthening , Energy conservation training/education, healthy coping education, Leisure and social pursuits, and community re-integration. Pt is limited by weakness, impaired balance, decreased endurance, increased fall risk, decreased ADLS, decreased IADLS, pain, decreased activity tolerance, decreased safety awareness, impaired judgement, decreased cognition, and decreased strength. Pt agreeable to participate in brief session and better mood noted. Cont pain during session and needed freq rest breaks in session due to pain/weakness. Pt cont to be impulsive at times and needs CS at all times while w/ him. Pt requesting A w/ navigating phone to make phone calls as he is having great difficulty due to worsening vision. Pt made calls to friend, Mercedes and sister, Pat leaving voicemails for both of them. Pt reports he is nervous that he will not be able to leave the house when he goes home as he cannot drive, sister works, and worsening vision makes taking public transportation a challenge. Will need to problem solve w/ pt ways navigate this barrier. In upcoming sessions, pls focus on endurance training, UB strengthening, balance, LHAE edu, and brace mgmt. OT services are warranted to address above barriers.    Prognosis Good   Problem List Decreased endurance;Impaired balance;Decreased mobility;Pain;Decreased safety awareness;Impaired judgement   Barriers to Discharge Inaccessible home environment;Decreased caregiver support   Plan   Treatment/Interventions ADL retraining;Functional transfer training;Therapeutic exercise;Endurance training   Progress Progressing toward goals   OT Therapy Minutes   OT Time In 0930   OT Time Out 1000   OT Total Time (minutes) 30   OT Mode of treatment - Individual (minutes) 30   OT Mode of treatment - Concurrent (minutes) 0   OT Mode of treatment - Group (minutes) 0   OT Mode of treatment - Co-treat (minutes) 0   OT Mode of Treatment - Total time(minutes) 30 minutes   OT Cumulative Minutes 90   Therapy Time missed   Time missed? Yes   Amount of time missed 30   Reason for time missed Extreme fatigue  (fatigue and inc pain)   Time(s) multiple attempts made 2

## 2025-04-13 NOTE — ASSESSMENT & PLAN NOTE
Wt Readings from Last 3 Encounters:   04/13/25 97.7 kg (215 lb 6.4 oz)   04/11/25 98.5 kg (217 lb 2.5 oz)   03/24/25 107 kg (234 lb 12.6 oz)   - not on diuretics, challenge edw as tolerated with UF on HD

## 2025-04-13 NOTE — ASSESSMENT & PLAN NOTE
Wt Readings from Last 3 Encounters:   04/13/25 97.7 kg (215 lb 6.4 oz)   04/11/25 98.5 kg (217 lb 2.5 oz)   03/24/25 107 kg (234 lb 12.6 oz)   Appears euvolemic except for LE pitting edema which appears to be chronic  Renal diet  Monitor intake, output, daily weights  Volume management with HD

## 2025-04-13 NOTE — ASSESSMENT & PLAN NOTE
- T[facility]  - access: right IJ perm cath  - EDW 99kg previously, last HD post weight 97kg  - challenge weight as tolerated   - next HD Tuesday 4/15

## 2025-04-13 NOTE — PLAN OF CARE
Problem: PAIN - ADULT  Goal: Verbalizes/displays adequate comfort level or baseline comfort level  Description: Interventions:- Encourage patient to monitor pain and request assistance- Assess pain using appropriate pain scale- Administer analgesics based on type and severity of pain and evaluate response- Implement non-pharmacological measures as appropriate and evaluate response- Consider cultural and social influences on pain and pain management- Notify physician/advanced practitioner if interventions unsuccessful or patient reports new pain  Outcome: Progressing     Problem: INFECTION - ADULT  Goal: Absence or prevention of progression during hospitalization  Description: INTERVENTIONS:- Assess and monitor for signs and symptoms of infection- Monitor lab/diagnostic results- Monitor all insertion sites, i.e. indwelling lines, tubes, and drains- Monitor endotracheal if appropriate and nasal secretions for changes in amount and color- [place] appropriate cooling/warming therapies per order- Administer medications as ordered- Instruct and encourage patient and family to use good hand hygiene technique- Identify and instruct in appropriate isolation precautions for identified infection/condition  Outcome: Progressing  Goal: Absence of fever/infection during neutropenic period  Description: INTERVENTIONS:- Monitor WBC  Outcome: Progressing     Problem: SAFETY ADULT  Goal: Patient will remain free of falls  Description: INTERVENTIONS:- Educate patient/family on patient safety including physical limitations- Instruct patient to call for assistance with activity - Consult OT/PT to assist with strengthening/mobility - Keep Call bell within reach- Keep bed low and locked with side rails adjusted as appropriate- Keep care items and personal belongings within reach- Initiate and maintain comfort rounds- Make Fall Risk Sign visible to staff- Offer Toileting every  Hours, in advance of need- Initiate/Maintain alarm- Obtain  necessary fall risk management equipment: - Apply yellow socks and bracelet for high fall risk patients- Consider moving patient to room near nurses station  Outcome: Progressing  Goal: Maintain or return to baseline ADL function  Description: INTERVENTIONS:-  Assess patient's ability to carry out ADLs; assess patient's baseline for ADL function and identify physical deficits which impact ability to perform ADLs (bathing, care of mouth/teeth, toileting, grooming, dressing, etc.)- Assess/evaluate cause of self-care deficits - Assess range of motion- Assess patient's mobility; develop plan if impaired- Assess patient's need for assistive devices and provide as appropriate- Encourage maximum independence but intervene and supervise when necessary- Involve family in performance of ADLs- Assess for home care needs following discharge - Consider OT consult to assist with ADL evaluation and planning for discharge- Provide patient education as appropriate  Outcome: Progressing  Goal: Maintains/Returns to pre admission functional level  Description: INTERVENTIONS:- Perform AM-PAC 6 Click Basic Mobility/ Daily Activity assessment daily.- Set and communicate daily mobility goal to care team and patient/family/caregiver. - Collaborate with rehabilitation services on mobility goals if consulted- Perform Range of Motion  times a day.- Reposition patient every  hours.- Dangle patient  times a day- Stand patient  times a day- Ambulate patient  times a day- Out of bed to chair  times a day - Out of bed for meals  times a day- Out of bed for toileting- Record patient progress and toleration of activity level   Outcome: Progressing     Problem: DISCHARGE PLANNING  Goal: Discharge to home or other facility with appropriate resources  Description: INTERVENTIONS:- Identify barriers to discharge w/patient and caregiver- Arrange for needed discharge resources and transportation as appropriate- Identify discharge learning needs (meds, wound  care, etc.)- Arrange for interpretive services to assist at discharge as needed- Refer to Case Management Department for coordinating discharge planning if the patient needs post-hospital services based on physician/advanced practitioner order or complex needs related to functional status, cognitive ability, or social support system  Outcome: Progressing     Problem: Prexisting or High Potential for Compromised Skin Integrity  Goal: Skin integrity is maintained or improved  Description: INTERVENTIONS:- Identify patients at risk for skin breakdown- Assess and monitor skin integrity- Assess and monitor nutrition and hydration status- Monitor labs - Assess for incontinence - Turn and reposition patient- Assist with mobility/ambulation- Relieve pressure over bony prominences- Avoid friction and shearing- Provide appropriate hygiene as needed including keeping skin clean and dry- Evaluate need for skin moisturizer/barrier cream- Collaborate with interdisciplinary team - Patient/family teaching- Consider wound care consult   Outcome: Progressing     Problem: METABOLIC, FLUID AND ELECTROLYTES - ADULT  Goal: Electrolytes maintained within normal limits  Description: INTERVENTIONS:- Monitor labs and assess patient for signs and symptoms of electrolyte imbalances- Administer electrolyte replacement as ordered- Monitor response to electrolyte replacements, including repeat lab results as appropriate- Instruct patient on fluid and nutrition as appropriate  Outcome: Progressing  Goal: Fluid balance maintained  Description: INTERVENTIONS:- Monitor labs - Monitor I/O and WT- Instruct patient on fluid and nutrition as appropriate- Assess for signs & symptoms of volume excess or deficit  Outcome: Progressing

## 2025-04-13 NOTE — ASSESSMENT & PLAN NOTE
Continue sevelamer TID with meals  nephrology following for inpatient HD while in [facility]  Dialysis T-TH-Sat while here in rehab

## 2025-04-13 NOTE — PROGRESS NOTES
04/13/25 1332   Therapy Time missed   Time missed? Yes   Amount of time missed 90   Reason for time missed Medical hold  (Dr Jayden Rowland wrote order to hold PT at this time due to patient's c/o extreme back pain.)

## 2025-04-13 NOTE — ASSESSMENT & PLAN NOTE
"Reports a right eye \"floater\" earlier in his hospitalization which has resolved but continues to have blurry central vision.  Denies eye pain  Ophthalmology consulted- visual acuity without correction is 20/400 OU. Dx: Proliferative diabetic retinopathy with vitreous hemorrhage OU as well as cataracts. Will need outpatient follow up immediately after discharge from rehab  "

## 2025-04-13 NOTE — ASSESSMENT & PLAN NOTE
CT CAP 4/2: Fracture of L2 vertebral body extending to the superior endplate  - Has an LSO brace  - admitted to [facility] for rehab

## 2025-04-13 NOTE — PROGRESS NOTES
Progress Note - Nephrology   Name: Naresh Carpio 64 y.o. male I MRN: [id number]  Unit/Bed#: -01 I Date of Admission: 4/11/2025   Date of Service: 4/13/2025 I Hospital Day: 2     Assessment & Plan  ESRD (end stage renal disease) ([facility])  - [facility]  - access: right IJ perm cath  - EDW 99kg previously, last HD post weight 97kg  - challenge weight as tolerated   - next HD Tuesday 4/15    Primary hypertension  - BP overall stable  - continue nifedipine 30mg daily  - continue to monitor  Anemia due to chronic kidney disease, on chronic dialysis ([facility])  - hgb below goal but slowly improving  - continue SURINDER 5000 units with dialysis  - transfuse as needed for hgb <7  Hyperphosphatemia  - continue sevelamer with meals  Chronic diastolic (congestive) heart failure ([facility])  Wt Readings from Last 3 Encounters:   04/13/25 97.7 kg (215 lb 6.4 oz)   04/11/25 98.5 kg (217 lb 2.5 oz)   03/24/25 107 kg (234 lb 12.6 oz)   - not on diuretics, challenge edw as tolerated with UF on HD  Traumatic retroperitoneal hematoma  - per rehab team  - resulted in retroperitoneal hemorrhage  - s/p IR embolization  Paroxysmal atrial fibrillation (HCC)  - rate controlled with procardia  - on eliquis  Diabetic ulcer of left midfoot associated with type 2 diabetes mellitus, limited to breakdown of skin ([facility])  Lab Results   Component Value Date    HGBA1C 5.7 (H) 02/09/2025   - per primary team  Hyponatremia  - fluid restriction    Nephrology service will follow.    Subjective   Patient complains of back pain, shoulder pain, and abdominal pain.  Denies sob and problems with dialysis.      Objective :  Temp:  [97.7 °F (36.5 °C)-98.4 °F (36.9 °C)] 98.4 °F (36.9 °C)  HR:  [60-75] 75  BP: (136-155)/(54-88) 148/54  Resp:  [17-18] 18  SpO2:  [97 %-98 %] 97 %  O2 Device: None (Room air)    Current Weight: Weight - Scale: 97.7 kg (215 lb 6.4 oz)  First Weight: Weight - Scale: 98.9 kg (218 lb)  I/O         04/11 0701 04/12 0700 04/12 0701 04/13 0700  04/13 0701  04/14 0700    P.O.  400     I.V. (mL/kg)  450 (4.6)     Total Intake(mL/kg)  850 (8.7)     Other  2501     Total Output  2501     Net  -1651            Unmeasured Urine Occurrence  1 x     Unmeasured Stool Occurrence   1 x          Physical Exam  General: NAD  Neuro: alert awake  Psych: mood and affect appropriate  Skin: no rash  Eyes: anicteric  ENMT: mm moist  Neck: no masses  Respiratory: ctab  Cardiovascular: rrr  Extremities: + significant bilateral LE edema  Gastrointestinal: soft nt nd    Medications:    Current Facility-Administered Medications:     acetaminophen (TYLENOL) tablet 975 mg, 975 mg, Oral, Q8H KEMAL, Damian Cerda MD, 975 mg at 04/13/25 0525    allopurinol (ZYLOPRIM) tablet 100 mg, 100 mg, Oral, Daily, Dmaian Cerda MD, 100 mg at 04/13/25 0847    apixaban (ELIQUIS) tablet 5 mg, 5 mg, Oral, BID, Damian Cerda MD, 5 mg at 04/13/25 0847    atorvastatin (LIPITOR) tablet 80 mg, 80 mg, Oral, Daily With Dinner, Damian Cerda MD, 80 mg at 04/12/25 1622    epoetin jessica (EPOGEN,PROCRIT) injection 3,000 Units, 3,000 Units, Intravenous, After Dialysis, 3,000 Units at 04/12/25 1031 **AND** epoetin jessica (EPOGEN,PROCRIT) injection 2,000 Units, 2,000 Units, Intravenous, After Dialysis, Damian Cerda MD, 2,000 Units at 04/12/25 1031    [START ON 4/14/2025] gabapentin (NEURONTIN) capsule 100 mg, 100 mg, Oral, Once per day on Monday Wednesday Friday, Damian Cerda MD    lidocaine (LIDODERM) 5 % patch 1 patch, 1 patch, Topical, Daily PRN, Damian Cerda MD, 1 patch at 04/12/25 2112    melatonin tablet 6 mg, 6 mg, Oral, HS, Jayden Rowland MD    methocarbamol (ROBAXIN) tablet 750 mg, 750 mg, Oral, Q6H KEMAL, Damian Cedra MD, 750 mg at 04/13/25 0525    naloxone (NARCAN) 0.04 mg/mL syringe 0.04 mg, 0.04 mg, Intravenous, Q1MIN PRN, Damian Cerda MD    NIFEdipine (PROCARDIA XL) 24 hr tablet 30 mg, 30 mg, Oral, After Dinner, Damian Cerda MD, 30 mg at 04/12/25 1724    oxyCODONE (ROXICODONE) IR tablet 5  mg, 5 mg, Oral, Q4H PRN **OR** oxyCODONE (ROXICODONE) immediate release tablet 10 mg, 10 mg, Oral, Q4H PRN, Damian Cerda MD, 10 mg at 04/13/25 0847    senna-docusate sodium (SENOKOT S) 8.6-50 mg per tablet 1 tablet, 1 tablet, Oral, HS, Damian Cerda MD, 1 tablet at 04/11/25 2202    sevelamer (RENAGEL) tablet 1,600 mg, 1,600 mg, Oral, TID With Meals, Damian Cerda MD, 1,600 mg at 04/13/25 0847      Lab Results: I have reviewed the following results:  Results from last 7 days   Lab Units 04/11/25  0508 04/10/25  0820 04/09/25  0541 04/08/25  1414 04/08/25  0801 04/07/25  1602 04/07/25  0602   WBC Thousand/uL 7.55 7.81 8.98  --  11.97*  --  11.37*   HEMOGLOBIN g/dL 7.4* 7.4* 7.3* 7.0* 7.0* 7.0* 6.5*   HEMATOCRIT % 25.0* 23.7* 23.5* 22.7* 21.6* 21.4* 21.4*   PLATELETS Thousands/uL 378 349 327  --  292  --  312   POTASSIUM mmol/L 4.4 4.4 4.1  --  5.6*  --  5.0   CHLORIDE mmol/L 96 93* 94*  --  93*  --  93*   CO2 mmol/L 28 28 26  --  23  --  25   BUN mg/dL 30* 52* 34*  --  62*  --  49*   CREATININE mg/dL 4.63* 7.50* 5.53*  --  8.57*  --  7.10*   CALCIUM mg/dL 9.1 9.0 8.9  --  8.9  --  8.9   MAGNESIUM mg/dL  --  2.2 2.2  --   --   --   --    PHOSPHORUS mg/dL  --  5.3* 4.3*  --   --   --   --

## 2025-04-13 NOTE — PROGRESS NOTES
"Progress Note - Hospitalist   Name: Naresh Carpio 64 y.o. male I MRN: [id number]  Unit/Bed#: [facility] 964-01 I Date of Admission: 4/11/2025   Date of Service: 4/13/2025 I Hospital Day: 2    Assessment & Plan  Traumatic retroperitoneal hematoma  Pt was admitted to the trauma service and found to have L2 vertebral body and retroperitoneal hematoma.  He was admitted to the trauma service who administered multiple units of PRBCs and had IR embolization of the distal right deep circumflex iliac artery and distal right superior gluteal artery both of which supply an area of active extravasation and then pseudoaneurysms present at distal branches of the left inferior gluteal artery. The artery was embolized using coils and gelfoam.   He stabilized and was discharged to [facility] for rehabilitation  Monitor hemoglobin with Eliquis use  Now admitted to [facility] for rehab  Anemia due to chronic kidney disease, on chronic dialysis ([facility])  Monitor hemoglobin while in rehab  Nephrology consulted for ESRD/dialysis  ESRD (end stage renal disease) ([facility])  Continue sevelamer TID with meals  nephrology following for inpatient HD while in [facility]  Dialysis T-TH-Sat while here in rehab  Paroxysmal atrial fibrillation ([facility])  Rate is controlled with procardia  Anticoagulation with Eliquis  Chronic diastolic (congestive) heart failure ([facility])  Wt Readings from Last 3 Encounters:   04/13/25 97.7 kg (215 lb 6.4 oz)   04/11/25 98.5 kg (217 lb 2.5 oz)   03/24/25 107 kg (234 lb 12.6 oz)   Appears euvolemic except for LE pitting edema which appears to be chronic  Renal diet  Monitor intake, output, daily weights  Volume management with HD    Primary hypertension  BP is acceptable, continue nifedipine  BP high in evening 4/11 but better today after dialysis  Visual disturbance  Reports a right eye \"floater\" earlier in his hospitalization which has resolved but continues to have blurry central vision.  Denies eye pain  Ophthalmology consulted- visual acuity without " correction is 20/400 OU. Dx: Proliferative diabetic retinopathy with vitreous hemorrhage OU as well as cataracts. Will need outpatient follow up immediately after discharge from rehab  Hyperphosphatemia    Diabetic ulcer of left midfoot associated with type 2 diabetes mellitus, limited to breakdown of skin (HCC)      Continue local care  Hyponatremia  Mild    Continue to monitor  Closed fracture of proximal end of left humerus with routine healing    Gout  Continue allopurinol  L2 vertebral fracture (HCC)  CT CAP 4/2: Fracture of L2 vertebral body extending to the superior endplate  - Has an LSO brace  - admitted to [facility] for rehab  Insomnia  Patient reports not sleeping well at night because he is awake worrying about his medical conditions  Discussed with PMR- starting melatonin    The above assessment and plan was reviewed and updated as determined by my evaluation of the patient on 4/13/2025.    History of Present Illness   Patient seen and examined. Patients overnight issues or events were reviewed with nursing staff. New or overnight issues include the following:   Patient sitting up in chair. Complains of back pain and feeling tired. States he is not sleeping at night because he is worried about his medical conditions.     Review of Systems    Objective :  Temp:  [97.7 °F (36.5 °C)-98.4 °F (36.9 °C)] 98.4 °F (36.9 °C)  HR:  [60-75] 75  BP: (136-155)/(54-88) 148/54  Resp:  [17-18] 18  SpO2:  [97 %-98 %] 97 %  O2 Device: None (Room air)    Invasive Devices       Hemodialysis Catheter  Duration             HD Permanent Double Catheter 592 days                    Physical Exam  General Appearance: NAD; pleasant  HEENT: PERRLA, conjuctiva normal; mucous membranes moist; face symmetrical  Neck:  Supple  Lungs: clear bilaterally, normal respiratory effort, no retractions, expiratory effort normal, on room air  CV: regular rate and rhythm, no murmurs rubs or gallops noted   ABD: soft non tender, +BS x4  EXT: DP  pulses intact, no lymphadenopathy  Skin: normal turgor, normal texture, no rash. Diabetic ulcers on feet- refer to clinical images scanned in EPIC  Psych: affect normal, mood normal  Neuro: AAOx3    The above physical exam was reviewed and updated as determined by my evaluation of the patient on 4/13/2025.      Lab Results: I have reviewed the following results:  Results from last 7 days   Lab Units 04/11/25  0508 04/10/25  0820   WBC Thousand/uL 7.55 7.81   HEMOGLOBIN g/dL 7.4* 7.4*   HEMATOCRIT % 25.0* 23.7*   PLATELETS Thousands/uL 378 349     Results from last 7 days   Lab Units 04/11/25  0508 04/10/25  0820   SODIUM mmol/L 133* 132*   POTASSIUM mmol/L 4.4 4.4   CHLORIDE mmol/L 96 93*   CO2 mmol/L 28 28   BUN mg/dL 30* 52*   CREATININE mg/dL 4.63* 7.50*   CALCIUM mg/dL 9.1 9.0         Results from last 7 days   Lab Units 04/07/25  1126   INR  1.20*     Results from last 7 days   Lab Units 04/11/25  0714 04/10/25  1810 04/10/25  0708   POC GLUCOSE mg/dl 93 115 102           Review of Scheduled Meds: Medications  reviewed and reconciled as needed  Current Facility-Administered Medications   Medication Dose Route Frequency Provider Last Rate    acetaminophen  975 mg Oral Q8H KEMAL Cerda MD      allopurinol  100 mg Oral Daily Damian Cerda MD      apixaban  5 mg Oral BID Damian Cerda MD      atorvastatin  80 mg Oral Daily With Dinner Damian Cerda MD      epoetin jessica  3,000 Units Intravenous After Dialysis Damian Cerda MD      And    epoetin jessica  2,000 Units Intravenous After Dialysis Damian Cerda MD      [START ON 4/14/2025] gabapentin  100 mg Oral Once per day on Monday Wednesday Friday Damian Cerda MD      lidocaine  1 patch Topical Daily PRN Damian Cerda MD      melatonin  6 mg Oral HS Jayden Rowland MD      methocarbamol  750 mg Oral Q6H KEMAL Damian Cerda MD      naloxone  0.04 mg Intravenous Q1MIN PRN Damian Cerda MD      NIFEdipine  30 mg Oral After Dinner Damian Cerda MD       oxyCODONE  5 mg Oral Q4H PRN Damian Cerda MD      Or    oxyCODONE  10 mg Oral Q4H PRN Damian Cerda MD      senna-docusate sodium  1 tablet Oral HS Damian Cerda MD      sevelamer  1,600 mg Oral TID With Meals Damian Cerda MD         VTE Pharmacologic Prophylaxis: eliquis  Code Status: Level 1 - Full Code  Current Length of Stay: 2 day(s)    Administrative Statements     ** Please Note:  voice to text software may have been used in the creation of this document. Although proof errors in transcription or interpretation are a potential of such software**

## 2025-04-13 NOTE — CONSULTS
This 64-year-old gentleman is status post fall with multiple fractures.  He has been readmitted after a retroperitoneal bleed.  He complains of a new floater in the right eye today.  Past medical history is significant for longstanding diabetes.  The patient does not get regular eye examinations and cannot remember the last time he had 1.    On examination, visual acuity without correction is 20/400 OU.  Pupils are equal round and reactive to light with no afferent defect.  Extraocular movements are intact.  The external examination is unremarkable.  Intraocular pressures are 13 OU.    Both eyes were dilated with Mydriacyl and Poncho-Synephrine at 7:30 PM.    There are moderate nuclear sclerotic and cortical spoking cataracts noted in both eyes.  There are both fresh and old vitreous hemorrhages noted in both eyes and dot blot hemorrhages throughout both posterior poles.    Impression: Proliferative diabetic retinopathy with vitreous hemorrhage OU.  I explained the nature of the condition and the prognosis without treatment.  The patient understands.    Plan: The patient will follow-up with me immediately after discharge for ongoing management and referral to a retina specialist.

## 2025-04-13 NOTE — ASSESSMENT & PLAN NOTE
Pt was admitted to the trauma service and found to have L2 vertebral body and retroperitoneal hematoma.  He was admitted to the trauma service who administered multiple units of PRBCs and had IR embolization of the distal right deep circumflex iliac artery and distal right superior gluteal artery both of which supply an area of active extravasation and then pseudoaneurysms present at distal branches of the left inferior gluteal artery. The artery was embolized using coils and gelfoam.   He stabilized and was discharged to [facility] for rehabilitation  Monitor hemoglobin with Eliquis use  Now admitted to [facility] for rehab

## 2025-04-13 NOTE — ASSESSMENT & PLAN NOTE
Patient reports not sleeping well at night because he is awake worrying about his medical conditions  Discussed with PMR- starting melatonin

## 2025-04-13 NOTE — PLAN OF CARE
Problem: PAIN - ADULT  Goal: Verbalizes/displays adequate comfort level or baseline comfort level  Description: Interventions:- Encourage patient to monitor pain and request assistance- Assess pain using appropriate pain scale- Administer analgesics based on type and severity of pain and evaluate response- Implement non-pharmacological measures as appropriate and evaluate response- Consider cultural and social influences on pain and pain management- Notify physician/advanced practitioner if interventions unsuccessful or patient reports new pain  4/13/2025 1223 by Meaghan Ernandez RN  Outcome: Progressing  4/13/2025 1004 by Meaghan Ernandez RN  Outcome: Progressing     Problem: INFECTION - ADULT  Goal: Absence or prevention of progression during hospitalization  Description: INTERVENTIONS:- Assess and monitor for signs and symptoms of infection- Monitor lab/diagnostic results- Monitor all insertion sites, i.e. indwelling lines, tubes, and drains- Monitor endotracheal if appropriate and nasal secretions for changes in amount and color- [place] appropriate cooling/warming therapies per order- Administer medications as ordered- Instruct and encourage patient and family to use good hand hygiene technique- Identify and instruct in appropriate isolation precautions for identified infection/condition  4/13/2025 1223 by Meaghan Ernandez RN  Outcome: Progressing  4/13/2025 1004 by Meaghan Ernandez RN  Outcome: Progressing  Goal: Absence of fever/infection during neutropenic period  Description: INTERVENTIONS:- Monitor WBC  4/13/2025 1223 by Meaghan Ernandez RN  Outcome: Progressing  4/13/2025 1004 by Meaghan Ernandez RN  Outcome: Progressing     Problem: SAFETY ADULT  Goal: Patient will remain free of falls  Description: INTERVENTIONS:- Educate patient/family on patient safety including physical limitations- Instruct patient to call for assistance with activity - Consult OT/PT to assist with strengthening/mobility - Keep Call bell  within reach- Keep bed low and locked with side rails adjusted as appropriate- Keep care items and personal belongings within reach- Initiate and maintain comfort rounds- Make Fall Risk Sign visible to staff- Offer Toileting every  Hours, in advance of need- Initiate/Maintain alarm- Obtain necessary fall risk management equipment: - Apply yellow socks and bracelet for high fall risk patients- Consider moving patient to room near nurses station  4/13/2025 1223 by Meaghan Ernandez RN  Outcome: Progressing  4/13/2025 1004 by Meaghan Ernandez RN  Outcome: Progressing  Goal: Maintain or return to baseline ADL function  Description: INTERVENTIONS:-  Assess patient's ability to carry out ADLs; assess patient's baseline for ADL function and identify physical deficits which impact ability to perform ADLs (bathing, care of mouth/teeth, toileting, grooming, dressing, etc.)- Assess/evaluate cause of self-care deficits - Assess range of motion- Assess patient's mobility; develop plan if impaired- Assess patient's need for assistive devices and provide as appropriate- Encourage maximum independence but intervene and supervise when necessary- Involve family in performance of ADLs- Assess for home care needs following discharge - Consider OT consult to assist with ADL evaluation and planning for discharge- Provide patient education as appropriate  4/13/2025 1223 by Meaghan Ernandez RN  Outcome: Progressing  4/13/2025 1004 by Meaghan Ernandez RN  Outcome: Progressing  Goal: Maintains/Returns to pre admission functional level  Description: INTERVENTIONS:- Perform AM-PAC 6 Click Basic Mobility/ Daily Activity assessment daily.- Set and communicate daily mobility goal to care team and patient/family/caregiver. - Collaborate with rehabilitation services on mobility goals if consulted- Perform Range of Motion  times a day.- Reposition patient every  hours.- Dangle patient  times a day- Stand patient  times a day- Ambulate patient  times a day-  Out of bed to chair  times a day - Out of bed for meals  times a day- Out of bed for toileting- Record patient progress and toleration of activity level   4/13/2025 1223 by Meaghan Ernandez RN  Outcome: Progressing  4/13/2025 1004 by Meaghan Ernandez RN  Outcome: Progressing     Problem: DISCHARGE PLANNING  Goal: Discharge to home or other facility with appropriate resources  Description: INTERVENTIONS:- Identify barriers to discharge w/patient and caregiver- Arrange for needed discharge resources and transportation as appropriate- Identify discharge learning needs (meds, wound care, etc.)- Arrange for interpretive services to assist at discharge as needed- Refer to Case Management Department for coordinating discharge planning if the patient needs post-hospital services based on physician/advanced practitioner order or complex needs related to functional status, cognitive ability, or social support system  4/13/2025 1223 by Meaghan Ernandez RN  Outcome: Progressing  4/13/2025 1004 by Meaghan Ernandez RN  Outcome: Progressing     Problem: Prexisting or High Potential for Compromised Skin Integrity  Goal: Skin integrity is maintained or improved  Description: INTERVENTIONS:- Identify patients at risk for skin breakdown- Assess and monitor skin integrity- Assess and monitor nutrition and hydration status- Monitor labs - Assess for incontinence - Turn and reposition patient- Assist with mobility/ambulation- Relieve pressure over bony prominences- Avoid friction and shearing- Provide appropriate hygiene as needed including keeping skin clean and dry- Evaluate need for skin moisturizer/barrier cream- Collaborate with interdisciplinary team - Patient/family teaching- Consider wound care consult   4/13/2025 1223 by Meaghan Ernandez RN  Outcome: Progressing  4/13/2025 1004 by Meaghan Ernandez RN  Outcome: Progressing     Problem: METABOLIC, FLUID AND ELECTROLYTES - ADULT  Goal: Electrolytes maintained within normal limits  Description:  INTERVENTIONS:- Monitor labs and assess patient for signs and symptoms of electrolyte imbalances- Administer electrolyte replacement as ordered- Monitor response to electrolyte replacements, including repeat lab results as appropriate- Instruct patient on fluid and nutrition as appropriate  4/13/2025 1223 by Meaghan Ernandez RN  Outcome: Progressing  4/13/2025 1004 by Meaghan Ernandez RN  Outcome: Progressing  Goal: Fluid balance maintained  Description: INTERVENTIONS:- Monitor labs - Monitor I/O and WT- Instruct patient on fluid and nutrition as appropriate- Assess for signs & symptoms of volume excess or deficit  4/13/2025 1223 by Meaghan Ernandez RN  Outcome: Progressing  4/13/2025 1004 by Meaghan Ernandez RN  Outcome: Progressing

## 2025-04-14 LAB
ANION GAP SERPL CALCULATED.3IONS-SCNC: 10 MMOL/L (ref 4–13)
BASE EXCESS BLDA CALC-SCNC: -5 MMOL/L (ref -2–3)
BUN SERPL-MCNC: 39 MG/DL (ref 5–25)
CA-I BLD-SCNC: 1.1 MMOL/L (ref 1.12–1.32)
CALCIUM SERPL-MCNC: 9.1 MG/DL (ref 8.4–10.2)
CHLORIDE SERPL-SCNC: 93 MMOL/L (ref 96–108)
CO2 SERPL-SCNC: 26 MMOL/L (ref 21–32)
CREAT SERPL-MCNC: 6.16 MG/DL (ref 0.6–1.3)
ERYTHROCYTE [DISTWIDTH] IN BLOOD BY AUTOMATED COUNT: 17.4 % (ref 11.6–15.1)
GFR SERPL CREATININE-BSD FRML MDRD: 8 ML/MIN/1.73SQ M
GLUCOSE P FAST SERPL-MCNC: 87 MG/DL (ref 65–99)
GLUCOSE SERPL-MCNC: 162 MG/DL (ref 65–140)
GLUCOSE SERPL-MCNC: 87 MG/DL (ref 65–140)
HCO3 BLDA-SCNC: 20.4 MMOL/L (ref 24–30)
HCT VFR BLD AUTO: 24.7 % (ref 36.5–49.3)
HCT VFR BLD CALC: 20 % (ref 36.5–49.3)
HGB BLD-MCNC: 7.6 G/DL (ref 12–17)
HGB BLDA-MCNC: 6.8 G/DL (ref 12–17)
MCH RBC QN AUTO: 29.1 PG (ref 26.8–34.3)
MCHC RBC AUTO-ENTMCNC: 30.8 G/DL (ref 31.4–37.4)
MCV RBC AUTO: 95 FL (ref 82–98)
MRSA NOSE QL CULT: ABNORMAL
MRSA NOSE QL CULT: ABNORMAL
PCO2 BLD: 22 MMOL/L (ref 21–32)
PCO2 BLD: 38.1 MM HG (ref 42–50)
PH BLD: 7.34 [PH] (ref 7.3–7.4)
PHOSPHATE SERPL-MCNC: 4.2 MG/DL (ref 2.3–4.1)
PLATELET # BLD AUTO: 336 THOUSANDS/UL (ref 149–390)
PMV BLD AUTO: 9.3 FL (ref 8.9–12.7)
PO2 BLD: 238 MM HG (ref 35–45)
POTASSIUM BLD-SCNC: 4.5 MMOL/L (ref 3.5–5.3)
POTASSIUM SERPL-SCNC: 4.7 MMOL/L (ref 3.5–5.3)
RBC # BLD AUTO: 2.61 MILLION/UL (ref 3.88–5.62)
SAO2 % BLD FROM PO2: 100 % (ref 60–85)
SODIUM BLD-SCNC: 130 MMOL/L (ref 136–145)
SODIUM SERPL-SCNC: 129 MMOL/L (ref 135–147)
SPECIMEN SOURCE: ABNORMAL
WBC # BLD AUTO: 9.11 THOUSAND/UL (ref 4.31–10.16)

## 2025-04-14 PROCEDURE — 99232 SBSQ HOSP IP/OBS MODERATE 35: CPT | Performed by: INTERNAL MEDICINE

## 2025-04-14 PROCEDURE — 97110 THERAPEUTIC EXERCISES: CPT

## 2025-04-14 PROCEDURE — 97530 THERAPEUTIC ACTIVITIES: CPT

## 2025-04-14 PROCEDURE — 99232 SBSQ HOSP IP/OBS MODERATE 35: CPT | Performed by: NURSE PRACTITIONER

## 2025-04-14 PROCEDURE — 84100 ASSAY OF PHOSPHORUS: CPT | Performed by: PHYSICIAN ASSISTANT

## 2025-04-14 PROCEDURE — NC001 PR NO CHARGE: Performed by: ORTHOPAEDIC SURGERY

## 2025-04-14 PROCEDURE — 85027 COMPLETE CBC AUTOMATED: CPT | Performed by: PHYSICIAN ASSISTANT

## 2025-04-14 PROCEDURE — 97116 GAIT TRAINING THERAPY: CPT

## 2025-04-14 PROCEDURE — 99233 SBSQ HOSP IP/OBS HIGH 50: CPT | Performed by: STUDENT IN AN ORGANIZED HEALTH CARE EDUCATION/TRAINING PROGRAM

## 2025-04-14 PROCEDURE — 80048 BASIC METABOLIC PNL TOTAL CA: CPT | Performed by: PHYSICIAN ASSISTANT

## 2025-04-14 RX ADMIN — OXYCODONE HYDROCHLORIDE 10 MG: 10 TABLET ORAL at 08:39

## 2025-04-14 RX ADMIN — OXYCODONE HYDROCHLORIDE 10 MG: 10 TABLET ORAL at 12:11

## 2025-04-14 RX ADMIN — SEVELAMER HYDROCHLORIDE 1600 MG: 800 TABLET ORAL at 12:12

## 2025-04-14 RX ADMIN — METHOCARBAMOL 1000 MG: 500 TABLET ORAL at 21:35

## 2025-04-14 RX ADMIN — SEVELAMER HYDROCHLORIDE 1600 MG: 800 TABLET ORAL at 17:20

## 2025-04-14 RX ADMIN — SENNOSIDES AND DOCUSATE SODIUM 1 TABLET: 50; 8.6 TABLET ORAL at 21:35

## 2025-04-14 RX ADMIN — OXYCODONE HYDROCHLORIDE 10 MG: 10 TABLET ORAL at 23:27

## 2025-04-14 RX ADMIN — ALLOPURINOL 100 MG: 100 TABLET ORAL at 08:36

## 2025-04-14 RX ADMIN — APIXABAN 5 MG: 5 TABLET, FILM COATED ORAL at 17:20

## 2025-04-14 RX ADMIN — METHOCARBAMOL 1000 MG: 500 TABLET ORAL at 05:55

## 2025-04-14 RX ADMIN — NIFEDIPINE 30 MG: 30 TABLET, FILM COATED, EXTENDED RELEASE ORAL at 17:20

## 2025-04-14 RX ADMIN — ATORVASTATIN CALCIUM 80 MG: 80 TABLET, FILM COATED ORAL at 17:20

## 2025-04-14 RX ADMIN — ACETAMINOPHEN 975 MG: 325 TABLET, FILM COATED ORAL at 21:34

## 2025-04-14 RX ADMIN — GABAPENTIN 100 MG: 100 CAPSULE ORAL at 08:36

## 2025-04-14 RX ADMIN — SEVELAMER HYDROCHLORIDE 1600 MG: 800 TABLET ORAL at 07:38

## 2025-04-14 RX ADMIN — OXYCODONE HYDROCHLORIDE 10 MG: 10 TABLET ORAL at 02:39

## 2025-04-14 RX ADMIN — METHOCARBAMOL 1000 MG: 500 TABLET ORAL at 14:27

## 2025-04-14 RX ADMIN — ACETAMINOPHEN 975 MG: 325 TABLET, FILM COATED ORAL at 05:55

## 2025-04-14 RX ADMIN — Medication 6 MG: at 21:35

## 2025-04-14 RX ADMIN — ACETAMINOPHEN 975 MG: 325 TABLET, FILM COATED ORAL at 14:27

## 2025-04-14 RX ADMIN — APIXABAN 5 MG: 5 TABLET, FILM COATED ORAL at 08:36

## 2025-04-14 NOTE — ASSESSMENT & PLAN NOTE
Appears euvolemic except for LE pitting edema which appears to be chronic  Renal diet  stable  Volume management via HD

## 2025-04-14 NOTE — PROGRESS NOTES
"Progress Note - Hospitalist   Name: Naresh Carpio 64 y.o. male I MRN: [id number]  Unit/Bed#: -01 I Date of Admission: 4/11/2025   Date of Service: 4/14/2025 I Hospital Day: 3    Assessment & Plan  Traumatic retroperitoneal hematoma  Admitted to the trauma service after a fall with L2 vertebral body and retroperitoneal hematoma.  S/p IR embolization of distal right deep circumflex iliac artery/distal right superior gluteal artery both of which supply an area of active extravasation and then pseudoaneurysms present at distal branches of the left inferior gluteal artery. The artery was embolized using coils and gelfoam.   Hemoglobin stable  Anemia due to chronic kidney disease, on chronic dialysis ([facility])  Had 5 U PRBCs during hospital stay  Stable at 7.6  ESRD (end stage renal disease) ([facility])  Continue Sevelamer TID with meals  Renal following  HD T-TH-Sat while here in rehab  Paroxysmal atrial fibrillation ([facility])  On no rate control medications  Examines RRR  Anticoagulation with Eliquis as at home  Chronic diastolic (congestive) heart failure ([facility])  Appears euvolemic except for LE pitting edema which appears to be chronic  Renal diet  stable  Volume management via HD  Primary hypertension  Continue Procardia XL 30 mg qd  Tx per renal  Visual disturbance  Reported a right eye \"floater\" without associated pain earlier in his hospitalization which has resolved but continued to have blurry central vision.  Ophthalmology saw over the weekend = visual acuity without correction is 20/400 OU. Dx: Proliferative diabetic retinopathy with vitreous hemorrhage OU as well as cataracts.   Will need outpatient follow up immediately after discharge from rehab  Diabetic ulcer of left midfoot associated with type 2 diabetes mellitus, limited to breakdown of skin ([facility])  Continue local care  Hyponatremia  Renal following  Tx/adjust with HD  Closed fracture of proximal end of left humerus with routine healing  Occurred from a fall " "in February and eval'd at the time of the injury  Was to continue NWB/sling and followup with Ortho but there was no followup done  Xray done 4/13/25 = \"Unchanged alignment of the chronic greater tuberosity avulsion fracture\".   Ortho to see here  Gout  Continue allopurinol  L2 vertebral fracture (HCC)  CT CAP 4/2: Fracture of L2 vertebral body extending to the superior endplate  Continue LSO brace  Insomnia  Patient reports not sleeping well at night because he is awake worrying about his medical conditions  Discussed with PMR over the weekend and started melatonin    The above assessment and plan was reviewed and updated as determined by my evaluation of the patient on 4/14/2025.    History of Present Illness   Patient seen and examined. Patients overnight issues or events were reviewed with nursing staff. New or overnight issues include the following:   No new or overnight issues.  Offers no complaints    Review of Systems   All other systems reviewed and are negative.      Objective :  Temp:  [97.6 °F (36.4 °C)-99.4 °F (37.4 °C)] 97.8 °F (36.6 °C)  HR:  [55-64] 55  BP: (131-143)/(54-61) 143/54  Resp:  [17-18] 17  SpO2:  [97 %-100 %] 97 %  O2 Device: None (Room air)    Invasive Devices       Peripheral Intravenous Line  Duration             Peripheral IV 04/13/25 Left;Ventral (anterior) Forearm <1 day              Hemodialysis Catheter  Duration             HD Permanent Double Catheter 593 days                    Physical Exam  General Appearance: no distress, non toxic appearing  HEENT: PERRLA, conjuctiva normal; oropharynx clear; mucous membranes moist   Neck:  Supple, normal ROM  Lungs: CTA, normal respiratory effort, no retractions, expiratory effort normal  CV: regular rate and rhythm; no rubs/murmurs/gallops, PMI normal   ABD: soft; ND/NT; +BS  EXT: + LE edema L>R  Skin: normal turgor, normal texture  Psych: affect normal, mood normal  Neuro: AAO        The above physical exam was reviewed and updated as " determined by my evaluation of the patient on 4/14/2025.      Lab Results: I have reviewed the following results:  Results from last 7 days   Lab Units 04/14/25  0611 04/13/25  1307 04/11/25  0508   WBC Thousand/uL 9.11  --  7.55   HEMOGLOBIN g/dL 7.6* 8.0* 7.4*   HEMATOCRIT % 24.7* 25.4* 25.0*   PLATELETS Thousands/uL 336  --  378     Results from last 7 days   Lab Units 04/14/25  0611 04/11/25  0508   SODIUM mmol/L 129* 133*   POTASSIUM mmol/L 4.7 4.4   CHLORIDE mmol/L 93* 96   CO2 mmol/L 26 28   BUN mg/dL 39* 30*   CREATININE mg/dL 6.16* 4.63*   CALCIUM mg/dL 9.1 9.1         Results from last 7 days   Lab Units 04/07/25  1126   INR  1.20*     Results from last 7 days   Lab Units 04/11/25  0714 04/10/25  1810 04/10/25  0708   POC GLUCOSE mg/dl 93 115 102       Imaging Results Review: No pertinent imaging studies reviewed.  Other Study Results Review: No additional pertinent studies reviewed.    Review of Scheduled Meds: Medications  reviewed and reconciled as needed  Current Facility-Administered Medications   Medication Dose Route Frequency Provider Last Rate    acetaminophen  975 mg Oral Q8H KEMAL Damian Cerad MD      allopurinol  100 mg Oral Daily Damian Cerda MD      apixaban  5 mg Oral BID Damian Cerda MD      atorvastatin  80 mg Oral Daily With Dinner Damian Cerda MD      epoetin jessica  3,000 Units Intravenous After Dialysis Damian Cerda MD      And    epoetin jessica  2,000 Units Intravenous After Dialysis Damian Cerda MD      gabapentin  100 mg Oral Once per day on Monday Wednesday Friday Damian Cerda MD      lidocaine  1 patch Topical Daily PRN Damian Cerda MD      melatonin  6 mg Oral HS Jayden Rowland MD      methocarbamol  1,000 mg Oral Q8H [facility] Jayden Rowland MD      naloxone  0.04 mg Intravenous Q1MIN PRN Damian Cerda MD      NIFEdipine  30 mg Oral After Dinner Damian Cerda MD      oxyCODONE  5 mg Oral Q4H PRN Damian Cerda MD      Or    oxyCODONE  10 mg Oral Q4H PRN Damian Cerda,  MD      oxyCODONE  2.5 mg Oral Q8H PRN Jayden Rowland MD      senna-docusate sodium  1 tablet Oral HS Damian Cerda MD      sevelamer  1,600 mg Oral TID With Meals Damian Cerda MD         VTE Pharmacologic Prophylaxis: Eliquis  Code Status: Level 1 - Full Code  Current Length of Stay: 3 day(s)    Administrative Statements     ** Please Note:  voice to text software may have been used in the creation of this document. Although proof errors in transcription or interpretation are a potential of such software**

## 2025-04-14 NOTE — ASSESSMENT & PLAN NOTE
"Reported a right eye \"floater\" without associated pain earlier in his hospitalization which has resolved but continued to have blurry central vision.  Ophthalmology saw over the weekend = visual acuity without correction is 20/400 OU. Dx: Proliferative diabetic retinopathy with vitreous hemorrhage OU as well as cataracts.   Will need outpatient follow up immediately after discharge from rehab  "

## 2025-04-14 NOTE — CASE MANAGEMENT
Met w/pt and reviewed rehab routine and cm role. Pt reluctant to answer questions as he felt  multiple people have been asking him the same ones. Pt states he has been staying with his sister Alicja and her , but he isn't going back there unless his vision clears. Pt states she takes care of her two grandchildren during the week. Pt is on dialysis and was driving himself. [facility] [place] [facility], t-th-sat 10 am chair time. Pt states he has been in rehab before, [facility], and was scheduled to go to [facility] but they weren't going to pay for pts transport to dialysis so pt came here. Cm reviewed team mtg to occur on Thursday and will follow up.  Cm attempted to educate pt on the need to assist in formulating a dc plan as the avg los in this setting is only two weeks. Pt uses [facility] in [place] for rx needs. Pt has no secondary insurance to medicare.     Along with lissy iraheta ot, phone call placed to pts sister Alicja. Pt has been living with her for at least 5 years. She lives in housing and technically he should not be staying with her. Pts address on the pre admission screen is a [email or web address] motel where he did live and currently uses as his mailing address. Ken is currently overwhelmed with pts health issues, frequent falls, and his missing dialysis treatments, which she reports as being twice a week. He manages to make the Saturday treatment but then misses tues and thurs.  Alicja does not want him to go to a facility but cleaning up after him and his illnesses is taxing. Cm reviewed team mtg to be held on Thursday and a phone will be made to her with an update.

## 2025-04-14 NOTE — ASSESSMENT & PLAN NOTE
TTS at [facility]  Access: Right IJ permacath  EDW 99 kg previously, last HD postweight 97 kg  Next HD: 0/15

## 2025-04-14 NOTE — DISCHARGE INSTR - OTHER ORDERS
Skin Care Plan:  1.Left Plantar Foot Wounds and Left toes:  Cleanse with NSS and pat dry. Paint all wounds with Betadine daily and cover plantar open wound with silicone bordered foam dressing. Naren with T for Treatment and change every other day or PRN  2-Turn/reposition q2h or when medically stable for pressure re-distribution on skin.  3-Elevate heels to offload pressure.  4-Moisturize skin daily with skin nourishing cream  5-Ehob cushion in chair when out of bed.  6-Preventative Hydraguard to heels BID and PRN.   7-Sacrum-buttocks: Cleanse wound with NSS, pat dry. Apply calcium alginate with silver to wound bed and cover with foam. Change every other day and prn.    8-Right medial foot wound: Cleanse with NSS. Apply Xeroform to wound bed and cover with DSD. Change daily and prn.

## 2025-04-14 NOTE — ASSESSMENT & PLAN NOTE
Admitted to the trauma service after a fall with L2 vertebral body and retroperitoneal hematoma.  S/p IR embolization of distal right deep circumflex iliac artery/distal right superior gluteal artery both of which supply an area of active extravasation and then pseudoaneurysms present at distal branches of the left inferior gluteal artery. The artery was embolized using coils and gelfoam.   Hemoglobin stable

## 2025-04-14 NOTE — WOUND OSTOMY CARE
Consult Note - Wound   Naresh Carpio 64 y.o. male MRN: [id number]  Unit/Bed#: -01 Encounter: [id number]        History and Present Illness:  Patient is 65 yo male admitted to [facility] with traumatic retroperitoneal hematoma. Patient has history of ESRD on HD, anemia, CHF, HTN, HLD, and DM2. Patient is continent of bowel and bladder. Patient is min assist with turning from side to side for assessment. Patient is independent with meals. Wound care consulted for buttock wound.    Assessment Findings:   B/L heels intact and blanching, preventative skin care orders placed.     Diabetic ulcer left medial foot- partial thickness skin loss with pink tissue, periwound skin is macerated, recommending discontinuing use of dermagran gauze, and just applying foam dressing. Intact eschar noted to left great toe.  MASD sacral/buttocks- small area of partial thickness skin loss with yellow tissue, periwound skin is fragile, scant drainage.      No induration, fluctuance, odor, warmth/temperature differences, redness, or purulence noted to the above noted wounds and skin areas assessed. New dressings applied per orders listed below. Patient tolerated well- no s/s of non-verbal pain or discomfort observed during the encounter. Bedside nurse aware of plan of care. See flow sheets for more detailed assessment findings.  Wound care will continue to follow, call or Secure Chat Message with questions.     Skin Care Plan:  1-Left Medial Foot and Left Plantar Foot Wounds: cleanse with NSS and pat dry. Apply silicone bordered foam dressings. Naren with T for Treatment and change every other day or PRN  2-Turn/reposition q2h or when medically stable for pressure re-distribution on skin.  3-Elevate heels to offload pressure.  4-Moisturize skin daily with skin nourishing cream  5-Ehob cushion in chair when out of bed.  6-Preventative Hydraguard to heels BID and PRN.   7-Paint Left Foot Great Toe and 2nd Toe Eschars with Betadine Daily.    8-Calazime paste to sacrum/buttocks BID and PRN      Wounds:  Wound 02/18/25 Diabetic Ulcer Toe D1, great Left;Medial (Active)   Wound Image   04/09/25 1112   Wound Description Dry;Brown 04/13/25 1800   Wound Length (cm) 1 cm 04/09/25 1112   Wound Width (cm) 1.2 cm 04/09/25 1112   Wound Surface Area (cm^2) 1.2 cm^2 04/09/25 1112   Drainage Amount None 04/11/25 0800   Treatments Site care 04/13/25 1800   Dressing Open to air 04/13/25 1800       Wound 03/26/25 Diabetic Ulcer Foot Left;Plantar (Active)   Wound Image   04/14/25 1025   Enter Delgadillo score: Delgadillo Grade 1: Partial or full-thickness ulcer (superficial) 04/14/25 1025   Wound Description Beefy red 04/14/25 1025   Non-staged Wound Description Partial thickness 04/14/25 1025   Wound Length (cm) 0.5 cm 04/14/25 1025   Wound Width (cm) 1 cm 04/14/25 1025   Wound Depth (cm) 0.1 cm 04/14/25 1025   Wound Surface Area (cm^2) 0.5 cm^2 04/14/25 1025   Wound Volume (cm^3) 0.05 cm^3 04/14/25 1025   Calculated Wound Volume (cm^3) 0.05 cm^3 04/14/25 1025   Drainage Amount Scant 04/14/25 1025   Drainage Description Serosanguineous 04/14/25 1025   Liliana-wound Assessment Fragile;Maceration 04/14/25 1025   Treatments Cleansed 04/14/25 1025   Wound Site Closure None 04/14/25 1025   Dressing Foam, Silicon (eg. Allevyn, etc) 04/14/25 1025   Wound packed? No 04/14/25 1025   Dressing Changed New 04/14/25 1025   Patient Tolerance Tolerated well 04/14/25 1025   Dressing Status Clean;Dry;Intact 04/14/25 1025       Wound 04/09/25 Coccyx (Active)   Wound Image   04/14/25 1026   Wound Description Yellow 04/14/25 1026   Non-staged Wound Description Partial thickness 04/14/25 1026   Wound Length (cm) 0.5 cm 04/14/25 1026   Wound Width (cm) 1.4 cm 04/14/25 1026   Wound Depth (cm) 0.1 cm 04/14/25 1026   Wound Surface Area (cm^2) 0.7 cm^2 04/14/25 1026   Wound Volume (cm^3) 0.07 cm^3 04/14/25 1026   Calculated Wound Volume (cm^3) 0.07 cm^3 04/14/25 1026   Drainage Amount Scant 04/14/25 1026    Drainage Description Serosanguineous 04/14/25 1026   Liliana-wound Assessment Fragile;Hyperpigmented 04/14/25 1026   Treatments Cleansed 04/14/25 1026   Wound Site Closure None 04/14/25 1026   Dressing Moisture barrier 04/14/25 1026   Wound packed? No 04/14/25 1026   Dressing Changed New 04/14/25 1026   Patient Tolerance Tolerated well 04/14/25 1026   Dressing Status Clean;Dry;Intact 04/14/25 1026           Debbie DANIELSONN, RN, CWOCN

## 2025-04-14 NOTE — PROGRESS NOTES
"OT daily treatment note       04/14/25 1300   Pain Assessment   Pain Assessment Tool 0-10   Pain Score 10 - Worst Possible Pain   Pain Location/Orientation Orientation: Lower;Location: Back;Orientation: Right;Location: Abdomen   Hospital Pain Intervention(s) Ambulation/increased activity;Emotional support   Restrictions/Precautions   Precautions Bed/chair alarms;Fall Risk;Fluid restriction;Pain;Spinal precautions;Supervision on toilet/commode;Visual deficit  (significant visual impairment)   Braces or Orthoses LSO   Lifestyle   Autonomy \"I dont have a plan\" (re: DC location)   Upper Body Dressing   Comment due to significance of pts visual impairment, OT taped pts LSO brace edges w/ bright orange tape to facilitate managment of the velcro. pt able to IND don/doff w/ use of tape but cont to not tighten brace enough due to R abdominal pain.   Sit to Stand   Type of Assistance Needed Physical assistance   Physical Assistance Level 25% or less   Comment Min A/CG w/ RW   Sit to Stand CARE Score 3   Bed-Chair Transfer   Type of Assistance Needed Physical assistance   Physical Assistance Level 25% or less   Comment Min A/CG SPT w/ RW   Chair/Bed-to-Chair Transfer CARE Score 3   Commmunity Re-entry   Community Re-entry Level Walker   Community Re-entry Level of Assistance Close supervision   Community Re-entry Pt participated in functional mobility task with use of RW w/ WCF to simulate home and community distances required for ADL/IADL completion. As of current, ambulation appears to be the only activity that relieves his pain. Task focused on increasing functional activity tolerance, endurance, strength and dynamic standing balance to improve independence with ADL completion.   Cognition   Overall Cognitive Status Impaired   Arousal/Participation Alert   Attention Attends with cues to redirect   Orientation Level Oriented X4   Memory Decreased recall of precautions   Following Commands Follows multistep commands with " "increased time or repetition   Comments questionable cog - will need to assess further. low frustration tolerance. became tearful expressing how he has had a really tough life, has little to no family or community support and states feeling depressed due to his new onset of visual impairments. benefits from emotional support and brightened with time. offered pastoral care - pt denied. to decrease pts low frustration tolerance and to minimize pt refusal, OT discussed plan for tomorrow which includes an ADL at 7am and PT at 10am. Pt in agreement, looking forward to shaving his face prior to HD. Wrote on pts board to write the schedule in large black font to allow the pt to be aware of his schedule for the following day.   Vision   Vision Comments visual screen completed. Binonculary visual tracking, convergence & divergence are WNL. W/ use of snellen chart from 10 ft away, binonculary and w/ either eye occluded, pt unable to see/verbalize any #s. downgraded to Snellen at 16 inches -Binocularly pts vision is 20/400, L eye only 20/320 and unable to see any letters w/ R eye only indicating a severe visual impairment. pt reports R eye floater is so large that it impedes his central vision. he does try to move his eyes L and R quickly to \"get the floater out of the way\" and try to read as quickly as possible until the floater returns to his central vision where he was then able to verbalize 2/5 on the top line. W/ use of a standard magnifying glass, pt was able to read at a 20/250 binonculary. he does NOT have his glases here.  Vision did not impede his ability to ambulate around the unit but it will greatly impact his ability to drive and complete IADLS. Will need to continue to assess and receive medical advice on any interventions that can be done here at the [facility] outside of modifying/adapting his environment and compensatory strategies.   Additional Activities   Additional Activities Other (Comment)   Additional " Activities Comments s/p vision screening, OT inquired to pt about what his plan is for home due to his new visual impairment. Pt became tearful and expressed that as of current, he has no DC plan. He reports his visual impairments will not allow him to DC home and he reports he cannot live with his sister (whom he was staying with prior) due to her being similar in his age and watching over two young children. at the end of the session, ELOISE Armstrong was present and facilitated the conversation w/ DC planning in which he cont to report there is no plan. CM and OT placed phone call to pts sister to further follow up as pt does get frustrated with multiple questions and sister is requesting a call back. Will need followup to determine a true DC plan.   Activity Tolerance   Activity Tolerance Patient limited by pain   Other Comments   Assessment pt initially refused therapy due to severe low back and right abdominal pain. discussion had w/ pt, OT and MD re: his pain and benefits to participate. Pt agreeable to particpate and ambulate w/ therapy to his tolerance.   Assessment   Treatment Assessment Pt participated in skilled OT session with focus on rapport building, visual screening, endurance training and community reintegration. See flowsheet for details of session and current functional status. Pt is limited by impaired balance, decreased endurance, increased fall risk, decreased ADLS, decreased IADLS, pain, decreased activity tolerance, decreased strength, and visual deficits and requires skilled OT services to increase independence and safety with ADL completion in prep for DC location TBD. Plan to continue ADL retraining, functional transfer training, UE strengthening/ROM, endurance training, Pt/caregiver education, equipment evaluation/education, compensatory technique education, continued education, energy conservation, and activity engagement  to address barriers mentioned above.   Prognosis Fair   Problem List  Decreased endurance;Impaired balance;Decreased mobility;Pain;Decreased safety awareness;Impaired judgement   Barriers to Discharge Inaccessible home environment;Decreased caregiver support   Plan   Treatment/Interventions ADL retraining;Functional transfer training;Therapeutic exercise;Endurance training;Patient/family training;Equipment eval/education;Compensatory technique education   Progress Progressing toward goals   OT Therapy Minutes   OT Time In 1300   OT Time Out 1424   OT Total Time (minutes) 84   OT Mode of treatment - Individual (minutes) 84   OT Mode of treatment - Concurrent (minutes) 0   OT Mode of treatment - Group (minutes) 0   OT Mode of treatment - Co-treat (minutes) 0   OT Mode of Treatment - Total time(minutes) 84 minutes   OT Cumulative Minutes 174   Therapy Time missed   Time missed? No

## 2025-04-14 NOTE — PROGRESS NOTES
"OT daily treatment note       04/14/25 2954   Pain Assessment   Pain Assessment Tool 0-10   Pain Score 10 - Worst Possible Pain   Pain Location/Orientation Orientation: Lower;Location: Back;Orientation: Right;Location: Abdomen   Hospital Pain Intervention(s) Ambulation/increased activity;Emotional support   Restrictions/Precautions   Precautions Bed/chair alarms;Fall Risk;Fluid restriction;Pain;Spinal precautions;Supervision on toilet/commode;Visual deficit  (significant visual impairment)   Braces or Orthoses LSO   Lifestyle   Autonomy \"I dont have a plan\" (re: DC location)   Upper Body Dressing   Comment due to significance of pts visual impairment, OT taped pts LSO brace edges w/ bright orange tape to facilitate managment of the velcro. pt able to IND don/doff w/ use of tape but cont to not tighten brace enough due to R abdominal pain.   Sit to Stand   Type of Assistance Needed Physical assistance   Physical Assistance Level 25% or less   Comment Min A/CG w/ RW   Sit to Stand CARE Score 3   Bed-Chair Transfer   Type of Assistance Needed Physical assistance   Physical Assistance Level 25% or less   Comment Min A/CG SPT w/ RW   Chair/Bed-to-Chair Transfer CARE Score 3   Commmunity Re-entry   Community Re-entry Level Walker   Community Re-entry Level of Assistance Close supervision   Community Re-entry Pt participated in functional mobility task with use of RW w/ WCF to simulate home and community distances required for ADL/IADL completion. As of current, ambulation appears to be the only activity that relieves his pain. Task focused on increasing functional activity tolerance, endurance, strength and dynamic standing balance to improve independence with ADL completion.   Cognition   Overall Cognitive Status Impaired   Arousal/Participation Alert   Attention Attends with cues to redirect   Orientation Level Oriented X4   Memory Decreased recall of precautions   Following Commands Follows multistep commands with " "increased time or repetition   Comments questionable cog - will need to assess further. low frustration tolerance. became tearful expressing how he has had a really tough life, has little to no family or community support and states feeling depressed due to his new onset of visual impairments. benefits from emotional support and brightened with time. offered pastoral care - pt denied. to decrease pts low frustration tolerance and to minimize pt refusal, OT discussed plan for tomorrow which includes an ADL at 7am and PT at 10am. Pt in agreement, looking forward to shaving his face prior to HD. Wrote on pts board to write the schedule in large black font to allow the pt to be aware of his schedule for the following day.   Vision   Vision Comments visual screen completed. Binonculary visual tracking, convergence & divergence are WNL. W/ use of snellen chart from 10 ft away, binonculary and w/ either eye occluded, pt unable to see/verbalize any #s. downgraded to Snellen at 16 inches -Binocularly pts vision is 20/400, L eye only 20/320 and unable to see any letters w/ R eye only indicating a severe visual impairment. pt reports R eye floater is so large that it impedes his central vision. he does try to move his eyes L and R quickly to \"get the floater out of the way\" and try to read as quickly as possible until the floater returns to his central vision where he was then able to verbalize 2/5 on the top line. W/ use of a standard magnifying glass, pt was able to read at a 20/250 binonculary. he does NOT have his glases here.  Vision did not impede his ability to ambulate around the unit but it will greatly impact his ability to drive and complete IADLS. Will need to continue to assess and receive medical advice on any interventions that can be done here at the [facility] outside of modifying/adapting his environment and compensatory strategies.   Additional Activities   Additional Activities Other (Comment)   Additional " Activities Comments s/p vision screening, OT inquired to pt about what his plan is for home due to his new visual impairment. Pt became tearful and expressed that as of current, he has no DC plan. He reports his visual impairments will not allow him to DC home and he reports he cannot live with his sister (whom he was staying with prior) due to her being similar in his age and watching over two young children. at the end of the session, ELOISE Armstrong was present and facilitated the conversation w/ DC planning in which he cont to report there is no plan. CM and OT placed phone call to pts sister to further follow up as pt does get frustrated with multiple questions and sister is requesting a call back. Will need followup to determine a true DC plan.   Activity Tolerance   Activity Tolerance Patient limited by pain   Other Comments   Assessment pt initially refused therapy due to severe low back and right abdominal pain. discussion had w/ pt, OT and MD re: his pain and benefits to participate. Pt agreeable to particpate and ambulate w/ therapy to his tolerance.   Assessment   Treatment Assessment Pt participated in skilled OT session with focus on rapport building, visual screening, endurance training and community reintegration. See flowsheet for details of session and current functional status. Pt is limited by impaired balance, decreased endurance, increased fall risk, decreased ADLS, decreased IADLS, pain, decreased activity tolerance, decreased strength, and visual deficits and requires skilled OT services to increase independence and safety with ADL completion in prep for DC location TBD. Plan to continue ADL retraining, functional transfer training, UE strengthening/ROM, endurance training, Pt/caregiver education, equipment evaluation/education, compensatory technique education, continued education, energy conservation, and activity engagement  to address barriers mentioned above.   Prognosis Fair   Problem List  Decreased endurance;Impaired balance;Decreased mobility;Pain;Decreased safety awareness;Impaired judgement   Barriers to Discharge Inaccessible home environment;Decreased caregiver support   Plan   Treatment/Interventions ADL retraining;Functional transfer training;Therapeutic exercise;Endurance training;Patient/family training;Equipment eval/education;Compensatory technique education   Progress Progressing toward goals   OT Therapy Minutes   OT Time In 1230   OT Time Out 1424   OT Total Time (minutes) 114   OT Mode of treatment - Individual (minutes) 114   OT Mode of treatment - Concurrent (minutes) 0   OT Mode of treatment - Group (minutes) 0   OT Mode of treatment - Co-treat (minutes) 0   OT Mode of Treatment - Total time(minutes) 114 minutes   OT Cumulative Minutes 204   Therapy Time missed   Time missed? No

## 2025-04-14 NOTE — OCCUPATIONAL THERAPY NOTE
Occupational Therapy Note    This writer (OT) and CM Patti placed phone call to pts sister Alicja to discuss DC planning. Pt has been living with his sister for the last 5 years. His mailing address is a hotel in [place] but his primary residence has been at the sisters home. Sister was informed that the pt is stating that he cannot return to her home due to the sister caring for two small children. She was in partial agreement stating she is extremely overwhelmed caring for two young children and she is happy and willing to assist pending the amount of assist he will need. Alicja informed OT and CM that pt was missing dialysis up to 2x/wk due to weather-related conditions, he has had multiple falls at home and she has had to come to his home (hotel?) to clean up blood because he would step on glass and not realize. She appeared very overwhelmed and was not completing her thoughts fully so it was difficult to understand all that she was saying. Alicja then inquired about what assistance he will need at DC and she was then educated that recommendations cannot necessarily be made yet with him being so new to the [facility]. OT then edu on the primary barrier that was addressed w/ OT today which was the severity of his visual impairment. OT provided some insight into his deficits and explained that due to this, he will not be able to drive himself to dialysis and will likely need assistance for all IADLS. Alicja was unable to state whether or not she can provide that level of assistance and became tearful stating she doesn't know what to do for him. Alicja inquired about getting the pt reading glasses or a magnifying glass for in the room - OT edu on benefits of having different strength glasses from the [employer] to see if any can improve his vision even slightly to allow for IADL mgmt. Alicja in agreement to comply. OT edu on weekly team meetings and educated that we can provide Alicja w/ updates on  Thursdays but DC planning will need to begin now due to the current situation. Alicja in agreement to try and assist any way she can. Plan to followup on Thursday.

## 2025-04-14 NOTE — ASSESSMENT & PLAN NOTE
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   Ortho to see here  "

## 2025-04-14 NOTE — PROGRESS NOTES
04/14/25 0830   Pain Assessment   Pain Assessment Tool 0-10   Pain Score 7   Pain Location/Orientation Orientation: Right;Orientation: Lower;Location: Back   Pain Onset/Description Onset: Ongoing;Descriptor: Aching   Patient's Stated Pain Goal No pain   Hospital Pain Intervention(s) Rest;Repositioned   Restrictions/Precautions   Precautions Bed/chair alarms;Cognitive;Fall Risk;Pain;Supervision on toilet/commode;Visual deficit   ROM Restrictions   (lumbar spinal prec)   Braces or Orthoses LSO   Cognition   Overall Cognitive Status Impaired   Arousal/Participation Alert   Attention Attends with cues to redirect   Orientation Level Oriented X4   Memory Decreased recall of precautions   Following Commands Follows multistep commands with increased time or repetition   Roll Left and Right   Comment per pt he will sleep in a recliner at home   Reason if not Attempted Refused to perform   Roll Left and Right CARE Score 7   Sit to Lying   Type of Assistance Needed Supervision   Comment in recliner   Sit to Lying CARE Score 4   Lying to Sitting on Side of Bed   Type of Assistance Needed Supervision   Comment in recliner   Lying to Sitting on Side of Bed CARE Score 4   Sit to Stand   Type of Assistance Needed Incidental touching;Physical assistance;Verbal cues;Adaptive equipment   Physical Assistance Level 25% or less   Comment PEPE with RW   Sit to Stand CARE Score 3   Bed-Chair Transfer   Type of Assistance Needed Physical assistance;Verbal cues;Adaptive equipment   Physical Assistance Level 26%-50%   Comment with RW   Chair/Bed-to-Chair Transfer CARE Score 3   Transfer Bed/Chair/Wheelchair   Adaptive Equipment Roller Walker   Car Transfer   Comment will perform into real car closer to discharge per ot.   Reason if not Attempted Refused to perform   Car Transfer CARE Score 7   Walk 10 Feet   Type of Assistance Needed Physical assistance;Verbal cues;Adaptive equipment   Physical Assistance Level 25% or less   Comment RW  "  Walk 10 Feet CARE Score 3   Walk 50 Feet with Two Turns   Type of Assistance Needed Physical assistance;Verbal cues;Adaptive equipment   Physical Assistance Level 25% or less   Comment with RW, WC pulled behind for safety   Walk 50 Feet with Two Turns CARE Score 3   Walk 150 Feet   Type of Assistance Needed Physical assistance;Verbal cues;Adaptive equipment   Physical Assistance Level Total assistance   Comment with chair follow   Walk 150 Feet CARE Score 1   Ambulation   Primary Mode of Locomotion Prior to Admission Walk   Distance Walked (feet) 150 ft  (x2)   Assist Device Roller Walker   Gait Pattern Inconsistant Lisa;Slow Lisa;Decreased foot clearance;Forward Flexion;Wide GARO;Shuffle;Improper weight shift   Limitations Noted In Balance;Endurance;Heel Strike;Posture;Safety;Speed;Strength;Swing   Provided Assistance with: Direction;Balance   Walk Assist Level Minimum Assist   Does the patient walk? 2. Yes   Wheelchair mobility   Does the patient use a wheelchair? 0. No   Curb or Single Stair   Style negotiated Curb   Type of Assistance Needed Physical assistance;Verbal cues;Adaptive equipment   Physical Assistance Level 26%-50%   Comment with RW on 6\" curb step   1 Step (Curb) CARE Score 3   4 Steps   Type of Assistance Needed Physical assistance;Verbal cues;Adaptive equipment   Physical Assistance Level 26%-50%   Comment BHR's on 6\"  steps, retro descend   4 Steps CARE Score 3   Stairs   Type Stairs;Curb   # of Steps 4   Weight Bearing Precautions Fall Risk;Back   Assist Devices Bilateral Rail   Picking Up Object   Type of Assistance Needed Physical assistance;Verbal cues;Adaptive equipment   Physical Assistance Level 26%-50%   Comment with reacher and RW   Picking Up Object CARE Score 3   Toilet Transfer   Comment stood at toilet with PEPE to urinate   Therapeutic Interventions   Strengthening seated LAQ, hip flex, hip ADD, hip ABD with blue tband, glute sets and ankle DF/PF 3 x10 reps BLE with " 2.5# AW.   Other TUG with RW 29.86, meaning pt is high risk for falls.   Equipment Use   NuStep refused   Assessment   Treatment Assessment Pt participated in skilled PT session with increased focus on gait, LE strengthening, stair management and endurance. Pt agreeable to some task but when asked to do things like the NuStep he declined. He refused to perform bed mobility and states he will sleep in his recliner. Pt required increased rest time throughout session 2/2 ongoing back pain. Encouragement during session was required as he is self limiting. Pt will cont POC as tolerated with cont focus on gait, stair management, increased endurance, increased strength and functional transfers to decrease burden of care.   Problem List Decreased endurance;Impaired balance;Decreased mobility;Pain;Decreased safety awareness;Impaired judgement   Barriers to Discharge Inaccessible home environment;Decreased caregiver support   PT Barriers   Functional Limitation Car transfers;Stair negotiation;Standing;Walking;Transfers   Plan   Treatment/Interventions Functional transfer training;LE strengthening/ROM;Therapeutic exercise;Endurance training;Cognitive reorientation;Gait training;Bed mobility   Progress Progressing toward goals   Discharge Recommendation   Equipment Recommended Walker   PT Therapy Minutes   PT Time In 0830   PT Time Out 1000   PT Total Time (minutes) 90   PT Mode of treatment - Individual (minutes) 90   PT Mode of treatment - Concurrent (minutes) 0   PT Mode of treatment - Group (minutes) 0   PT Mode of treatment - Co-treat (minutes) 0   PT Mode of Treatment - Total time(minutes) 90 minutes   PT Cumulative Minutes 120   Therapy Time missed   Time missed? No

## 2025-04-14 NOTE — PLAN OF CARE
Problem: PAIN - ADULT  Goal: Verbalizes/displays adequate comfort level or baseline comfort level  Description: Interventions:- Encourage patient to monitor pain and request assistance- Assess pain using appropriate pain scale- Administer analgesics based on type and severity of pain and evaluate response- Implement non-pharmacological measures as appropriate and evaluate response- Consider cultural and social influences on pain and pain management- Notify physician/advanced practitioner if interventions unsuccessful or patient reports new pain  Outcome: Progressing     Problem: SAFETY ADULT  Goal: Patient will remain free of falls  Description: INTERVENTIONS:- Educate patient/family on patient safety including physical limitations- Instruct patient to call for assistance with activity - Consult OT/PT to assist with strengthening/mobility - Keep Call bell within reach- Keep bed low and locked with side rails adjusted as appropriate- Keep care items and personal belongings within reach- Initiate and maintain comfort rounds- Make Fall Risk Sign visible to staff- Offer Toileting every 2 Hours, in advance of need- Initiate/Maintain alarm- Obtain necessary fall risk management equipment: - Apply yellow socks and bracelet for high fall risk patients- Consider moving patient to room near nurses station  Outcome: Progressing

## 2025-04-14 NOTE — ASSESSMENT & PLAN NOTE
Hemoglobin today 7.6 below goal  Continue SURINDER with dialysis  Transfuse to keep hemoglobin more than 7

## 2025-04-14 NOTE — ASSESSMENT & PLAN NOTE
Patient reports not sleeping well at night because he is awake worrying about his medical conditions  Discussed with PMR over the weekend and started melatonin

## 2025-04-14 NOTE — PROGRESS NOTES
NEPHROLOGY HOSPITAL PROGRESS NOTE   Naresh Carpio 64 y.o. male MRN: [id number]  Unit/Bed#: -01 Encounter: [id number]  Reason for Consult: ESRD on HD    Assessment & Plan  ESRD (end stage renal disease) ([facility])  TTS at [facility]  Access: Right IJ permacath  EDW 99 kg previously, last HD postweight 97 kg  Next HD: 0/15  Primary hypertension  Blood pressure acceptable  Continue nifedipine 30 mg daily  Continue to monitor  Anemia due to chronic kidney disease, on chronic dialysis ([facility])  Hemoglobin today 7.6 below goal  Continue SURINDER with dialysis  Transfuse to keep hemoglobin more than 7  Hyperphosphatemia  Continue sevelamer with meals  Chronic diastolic (congestive) heart failure (HCC)  Ultrafiltration as tolerated on HD  Traumatic retroperitoneal hematoma  Status post IR embolization  Management per primary team  Paroxysmal atrial fibrillation (HCC)  Management per primary team  Diabetic ulcer of left midfoot associated with type 2 diabetes mellitus, limited to breakdown of skin (HCC)  Management per primary team  Hyponatremia  Continue fluid restriction  138 mEq sodium bath on dialysis    I have reviewed the nephrology recommendations including dialysis tomorrow as patient is stable for interdialytic timeframe, with internal medicine team, and we are in agreement with renal plan including the information outlined above.    SUBJECTIVE / 24H INTERVAL HISTORY:  Concerned about visual disturbance.  Denies dyspnea.  Denies leg swelling.    OBJECTIVE:  Current Weight: Weight - Scale: 99.1 kg (218 lb 6.4 oz)  Vitals:    04/13/25 1327 04/13/25 2009 04/14/25 0600 04/14/25 0605   BP: 131/61 143/54  143/54   BP Location: Left arm Right arm  Right arm   Pulse: 64 58  55   Resp: 17 18  17   Temp: 99.4 °F (37.4 °C) 97.6 °F (36.4 °C)  97.8 °F (36.6 °C)   TempSrc: Oral Oral  Oral   SpO2: 99% 100%  97%   Weight:   99.1 kg (218 lb 6.4 oz)    Height:           Intake/Output Summary (Last 24 hours) at 4/14/2025  0737  Last data filed at 4/13/2025 1700  Gross per 24 hour   Intake 360 ml   Output --   Net 360 ml     Review of Systems   Constitutional:  Negative for chills and fever.   HENT:  Negative for ear pain and sore throat.    Eyes:  Positive for visual disturbance. Negative for pain.   Respiratory:  Negative for cough and shortness of breath.    Cardiovascular:  Negative for chest pain and palpitations.   Gastrointestinal:  Negative for abdominal pain and vomiting.   Genitourinary:  Negative for dysuria and hematuria.   Musculoskeletal:  Negative for arthralgias and back pain.   Skin:  Negative for color change and rash.   Neurological:  Negative for seizures and syncope.   All other systems reviewed and are negative.    Physical Exam  Vitals and nursing note reviewed.   Constitutional:       General: He is not in acute distress.     Appearance: He is well-developed.   HENT:      Head: Normocephalic and atraumatic.   Eyes:      Conjunctiva/sclera: Conjunctivae normal.   Cardiovascular:      Rate and Rhythm: Normal rate and regular rhythm.      Pulses: Normal pulses.      Heart sounds: Normal heart sounds. No murmur heard.     Comments: Right chest wall permacath present  Pulmonary:      Effort: Pulmonary effort is normal. No respiratory distress.      Breath sounds: Normal breath sounds.   Abdominal:      Palpations: Abdomen is soft.      Tenderness: There is no abdominal tenderness.   Musculoskeletal:         General: No swelling.      Cervical back: Neck supple.      Right lower leg: Edema present.      Left lower leg: Edema present.   Skin:     General: Skin is warm and dry.      Capillary Refill: Capillary refill takes less than 2 seconds.   Neurological:      Mental Status: He is alert.   Psychiatric:         Mood and Affect: Mood normal.         Medications:    Current Facility-Administered Medications:     acetaminophen (TYLENOL) tablet 975 mg, 975 mg, Oral, [facility]8H [facility], 975 mg at 04/14/25 2242     allopurinol (ZYLOPRIM) tablet 100 mg, 100 mg, Oral, Daily, Damian Cerda MD, 100 mg at 04/13/25 0847    apixaban (ELIQUIS) tablet 5 mg, 5 mg, Oral, BID, Damian Cerda MD, 5 mg at 04/13/25 1654    atorvastatin (LIPITOR) tablet 80 mg, 80 mg, Oral, Daily With Dinner, Damian Cerda MD, 80 mg at 04/13/25 1653    epoetin jessica (EPOGEN,PROCRIT) injection 3,000 Units, 3,000 Units, Intravenous, After Dialysis, 3,000 Units at 04/12/25 1031 **AND** epoetin jessica (EPOGEN,PROCRIT) injection 2,000 Units, 2,000 Units, Intravenous, After Dialysis, Damian Cerda MD, 2,000 Units at 04/12/25 1031    gabapentin (NEURONTIN) capsule 100 mg, 100 mg, Oral, Once per day on Monday Wednesday Friday, Damian Cerda MD    lidocaine (LIDODERM) 5 % patch 1 patch, 1 patch, Topical, Daily PRN, Damian Cerda MD, 1 patch at 04/13/25 2046    melatonin tablet 6 mg, 6 mg, Oral, HS, Jayden Rowland MD, 6 mg at 04/13/25 2047    methocarbamol (ROBAXIN) tablet 1,000 mg, 1,000 mg, Oral, Q8H KEMAL, Jayden Rowalnd MD, 1,000 mg at 04/14/25 0555    naloxone (NARCAN) 0.04 mg/mL syringe 0.04 mg, 0.04 mg, Intravenous, Q1MIN PRN, Damian Cerda MD    NIFEdipine (PROCARDIA XL) 24 hr tablet 30 mg, 30 mg, Oral, After Dinner, Damian Cerda MD, 30 mg at 04/13/25 1653    oxyCODONE (ROXICODONE) IR tablet 5 mg, 5 mg, Oral, Q4H PRN **OR** oxyCODONE (ROXICODONE) immediate release tablet 10 mg, 10 mg, Oral, Q4H PRN, Damian Cerda MD, 10 mg at 04/14/25 0239    oxyCODONE (ROXICODONE) split tablet 2.5 mg, 2.5 mg, Oral, Q8H PRN, Jayden Rowland MD    senna-docusate sodium (SENOKOT S) 8.6-50 mg per tablet 1 tablet, 1 tablet, Oral, HS, Damian Cerda MD, 1 tablet at 04/13/25 2048    sevelamer (RENAGEL) tablet 1,600 mg, 1,600 mg, Oral, TID With Meals, Damian Cerda MD, 1,600 mg at 04/13/25 1653    Laboratory Results:  Results from last 7 days   Lab Units 04/14/25  0611 04/13/25  1307 04/11/25  0508 04/10/25  0820 04/09/25  0541 04/08/25  1414 04/08/25  0801   WBC Thousand/uL  "9.11  --  7.55 7.81 8.98  --  11.97*   HEMOGLOBIN g/dL 7.6* 8.0* 7.4* 7.4* 7.3* 7.0* 7.0*   HEMATOCRIT % 24.7* 25.4* 25.0* 23.7* 23.5* 22.7* 21.6*   PLATELETS Thousands/uL 336  --  378 349 327  --  292   POTASSIUM mmol/L  --   --  4.4 4.4 4.1  --  5.6*   CHLORIDE mmol/L  --   --  96 93* 94*  --  93*   CO2 mmol/L  --   --  28 28 26  --  23   BUN mg/dL  --   --  30* 52* 34*  --  62*   CREATININE mg/dL  --   --  4.63* 7.50* 5.53*  --  8.57*   CALCIUM mg/dL  --   --  9.1 9.0 8.9  --  8.9   MAGNESIUM mg/dL  --   --   --  2.2 2.2  --   --    PHOSPHORUS mg/dL  --   --   --  5.3* 4.3*  --   --        Portions of the record may have been created with voice recognition software. Occasional wrong word or \"sound a like\" substitutions may have occurred due to the inherent limitations of voice recognition software. Read the chart carefully and recognize, using context, where substitutions have occurred.If you have any questions, please contact the dictating provider.    "

## 2025-04-14 NOTE — ASSESSMENT & PLAN NOTE
Patient reports he has an overall a poor sleep hygiene: he sleeps at 1-2am nightly and watches TV up until bed.  Advised patient to attempt to regulate sleep/wake cycle while here to better participate in therapy. Attempt to try to sleep earlier around midnight

## 2025-04-14 NOTE — ASSESSMENT & PLAN NOTE
Assessment:    64 y.o.male 9 weeks s/p nonoperative management of left humerus greater tuberosity fracture.  Patient doing well and working with PT with no pain.  Imaging shows appropriate bone healing.    Plan:   WBAT  Left Upper Extremity  Continue PT to work on strength/range of motion  Outpatient follow-up with Dr. Cisneros in 4 weeks  Pain control  Remainder of medical comanagement per primary team

## 2025-04-14 NOTE — CONSULTS
[facility] Progress Note - Orthopedics   Name: Naresh Carpio 64 y.o. male I MRN: [id number]  Unit/Bed#: [facility] 964-01 I Date of Admission: 4/11/2025   Date of Service: 4/14/2025 I Hospital Day: 3   Inpatient consult to Orthopedic Surgery  Consult performed by: Vijay Dela Cruz MD  Consult ordered by: Michelle Landa DO        Physician Requesting Evaluation: Damian Cerda MD   Reason for Evaluation / Principal Problem: Follow-up status post proximal humerus fracture    Assessment & Plan  Closed fracture of proximal end of left humerus with routine healing  Assessment:    64 y.o.male 9 weeks s/p nonoperative management of left humerus greater tuberosity fracture.  Patient doing well and working with PT with no pain.  Imaging shows appropriate bone healing.    Plan:   WBAT  Left Upper Extremity  Continue PT to work on strength/range of motion  Outpatient follow-up with Dr. Cisneros in 4 weeks  Pain control  Remainder of medical comanagement per primary team        History of Present Illness   HPI: Naresh Carpio is a 64 y.o. year old male who presents as a consult for follow-up.  He is 9 weeks out from a left humerus greater tuberosity avulsion fracture on 2/8/2025. He was managed nonop in a sling with instructions to follow-up with you in 2 weeks. Patient was lost to follow-up and is currently at [facility] rehab following admission for L2 vertebral body and retroperitoneal hematoma after a fall on 4/11. Patient is doing well. Stated he removed his sling a couple weeks ago as he was no longer having any pain. Has been working with physical therapy with range of motion exercises. No acute events, no new complaints.      Review of Systems significant for findings described in the HPI.  Historical Information   Past Medical History:   Diagnosis Date    Acute cystitis 10/18/2024    Acute on chronic anemia 01/23/2022    Atrial fibrillation (HCC)     Bilateral sacral fracture, closed (HCC) 07/03/2024    Bradycardia 09/05/2023     Diabetes mellitus (HCC)     ESRD (end stage renal disease) on dialysis (HCC)     Hematuria 10/10/2024    Hematuria 10/10/2024    Hyperkalemia 04/06/2024    Hyperkalemia 04/06/2024    Hyperlipidemia     Hypertension     Left toe amputee (HCC)     Subacute osteomyelitis of right foot (HCC)     TIA (transient ischemic attack)     Toxic metabolic encephalopathy 10/08/2024    Traumatic rhabdomyolysis (HCC) 08/19/2023     Past Surgical History:   Procedure Laterality Date    AMPUTATION Left     left foot resection 10 years ago dr tran    HEMODIALYSIS ADULT  1/18/2025    HEMODIALYSIS ADULT  2/27/2025    IR EMBOLIZATION (SPECIFY VESSEL OR SITE)  4/3/2025    IR EMBOLIZATION (SPECIFY VESSEL OR SITE)  4/7/2025    IR LOWER EXTREMITY ANGIOGRAM  08/29/2023    IR TEMPORARY DIALYSIS CATHETER PLACEMENT  08/25/2023    IR TUNNELED CENTRAL LINE REMOVAL  08/23/2023    IR TUNNELED DIALYSIS CATHETER PLACEMENT  01/27/2022    IR TUNNELED DIALYSIS CATHETER PLACEMENT  08/30/2023    UT AMPUTATION METATARSAL W/TOE SINGLE Right 8/31/2023    Procedure: RIGHT PARTIAL 1ST RAY RESECTION WITH  WOUND VAC APPLICATION;  Surgeon: Won Parmar DPM;  Location: [facility];  Service: Podiatry     Social History     Tobacco Use    Smoking status: Never     Passive exposure: Never    Smokeless tobacco: Never   Vaping Use    Vaping status: Never Used   Substance and Sexual Activity    Alcohol use: Not Currently     Alcohol/week: 0.0 standard drinks of alcohol     Comment: 0    Drug use: Never    Sexual activity: Not on file     E-Cigarette/Vaping    E-Cigarette Use Never User      E-Cigarette/Vaping Substances    Nicotine No     THC No     CBD No     Flavoring No     Other No     Unknown No          Objective :  Temp:  [97.6 °F (36.4 °C)-98.2 °F (36.8 °C)] 98.2 °F (36.8 °C)  HR:  [55-70] 70  BP: (143-154)/(49-54) 154/49  Resp:  [17-18] 17  SpO2:  [97 %-100 %] 97 %  O2 Device: None (Room air)  Physical ExamOrtho Exam   Musculoskeletal: left  shoulder/arm  Skin dry and intact  No focal TTP or pain with movement  AROM shoulder 0-90 forward flexion, 0-90 abduction, 0-15 external rotation, 0-60 internal rotation  Sensation intact to axillary, musculocutaneous, radial, ulna, median nerves  5/5 motor strength to axillary, musculocutaneous, radial, ulna, median nerves  2+ radial and ulnar pulse   Musculature is soft and compressible, no pain with passive stretch    Radiology:   I personally reviewed the films.  X-rays AP/Lateral views of left shoulder shows L humerus with appropriate bone healing of greater tuberosity fracture      Lab Results: I have reviewed the following results:   Recent Labs     04/13/25  1307 04/14/25  0611   WBC  --  9.11   HGB 8.0* 7.6*   HCT 25.4* 24.7*   PLT  --  336   BUN  --  39*   CREATININE  --  6.16*     Blood Culture:   Lab Results   Component Value Date    BLOODCX No Growth After 5 Days. 03/12/2024    BLOODCX No Growth After 5 Days. 03/12/2024     Wound Culture:   Lab Results   Component Value Date    WOUNDCULT 2+ Growth of Staphylococcus aureus (A) 03/18/2024

## 2025-04-14 NOTE — PROGRESS NOTES
Progress Note - PMR   Name: Naresh Carpio 64 y.o. male I MRN: [id number]  Unit/Bed#: -01 I Date of Admission: 4/11/2025   Date of Service: 4/14/2025 I Hospital Day: 3     Assessment & Plan  Traumatic retroperitoneal hematoma  - Sustained from mechanical fall several days prior to initial presentation on 4/2/25, presenting with progressively worsening low back pain. Trauma imaging revealed a large retroperitoneal hematoma with active extravasation and hemoglobin drop. Now s/p transfusion, Kcentra and ultimately IR embolization of the right deep circumflex iliac artery and superior gluteal artery with Dr. Garcia on 4/3/25. Unfortunately, on 4/6/25, his hemoglobin decreased and repeat CT showed a 5 mm inferior gluteal artery branch pseudoaneurysm and he is s/p IR pelvic angiogram on 4/7/25 with Dr. Hernandez, which demonstrated pseudoaneurysms at the distal branches of the left inferior gluteal artery which was embolized.   - Continue to trend Hgb on CBC closely, now back on anticoagulation  - Contact IR if there are additional bleeding concerns given multiple prior interventions described above  - Local right hip/groin access site incision care  - Analgesia, see below  - PT and OT  Acute pain due to trauma  - Evaluated by APS on acute care  - Tylenol 975 mg q8h scheduled  - Gabapentin 100 mg three times weekly  - Methocarbamol 750 mg q8h scheduled  - Lidocaine patches daily PRN  - Oxycodone 5 or 10 mg q4h PRN, wean as possible  Impaired mobility and activities of daily living  - Rehabilitation medicine physician for daily monitoring of care, 24 hour availability for acute medical issues, medication management, and therapeutic and diagnostic assessments.  - 24 hour rehabilitation nursing 7 days per week for: management/teaching of medications, bowel/bladder routine, skin care.  - PT, OT for 2-3 hours per day, 5 to 6 days per week; 15 hours per week  - Rehabilitation Psychology as needed for adjustment and coping  -  CM for barriers to discharge, community resources, and family support  - Discharge planning following to help ensure a safe and efficient discharge  - 900/7 program due to HD status, pain, expected therapy tolerance    L2 vertebral fracture (HCC)  - Sustained from a mechanical fall on 3/24/25, found to have an acute, obliquely oriented fracture of the L2 vertebral body with distraction of the dominant fracture fragment and involvement of the anterior and posterior cortices and superior endplate  - At that time, managed non-operatively with LSO bracing, though it does not appear orthopedic spine surgery or neurosurgery evaluated at the time of injury  - Maintain LSO brace with lumbar spinal precautions for now  - Should follow-up with spine surgery  Closed fracture of proximal end of left humerus with routine healing  - Sustained from fall in early February, per CT of the LUE on 2/9/25, there was a minimally displaced fracture of the anterior facet of the greater tuberosity of the left humerus, minimally apparent on repeat x-ray of the shoulder on 3/24/25  - He was evaluated by orthopedic surgery at the time of the initial injury and was recommended to maintain NWB through the LUE with a sling  - There is no further orthopedic evaluation documentation and it does not appear that further follow-up occurred.  - Clinically, he is not describing ongoing significant left arm or shoulder pain and has been weight-bearing through the LUE  > 4/14: Repeat humerus X-ray done yesterday shows no change in fracture alignment. Ortho consulted.  - Monitor for signs of worsening pain  Insomnia  Patient reports he has an overall a poor sleep hygiene: he sleeps at 1-2am nightly and watches TV up until bed.  Advised patient to attempt to regulate sleep/wake cycle while here to better participate in therapy. Attempt to try to sleep earlier around midnight  At risk for constipation  > Last BM 4/11  - Monitor closely for constipation  "and/or incontinence. Will follow BMs and adjust bowel regimen to target soft, formed stools q1-2 days, per pt's regular schedule.  Anemia due to chronic kidney disease, on chronic dialysis (HCC)  > 4/14: Hgb 7.6    - Trend Hgb on CBC  - Epoetin jessica with dialysis  - Transfusions per IM, appreciate their recommendations  Visual disturbance  - Reporting \"floater\" within the right eye with acute onset on 4/11/25 at mid-day while watching TV; describes a dark, \"spider\"-shaped floater that occupies a significant portion of the visual field, painless; has similar floaters in the left eye but smaller  - Ophthalmology consulted on [facility] admission by IM, per prior trauma surgery documentation, Dr. Chaudhry of ophthalmology was made personally aware of clinical situation on 4/11/25 prior to [facility] admission  > Ophthalmology consult 4/12: Exam c/w proliferative diabetic retinopathy with vitreous hemorrhage. Pt will follow up with ophtho immediately after discharge and for referral to a retina specialist    - Monitor for changes in vision  Diabetic ulcer of left midfoot associated with type 2 diabetes mellitus, limited to breakdown of skin ([facility])  - Has chronic wounds affecting the left foot plantar surface, left foot 1st and 2nd digits  - For left medial foot, left plantar surface: cleanse with NSS and pat dry. Apply dermagran to plantar wound only. Cover both wounds with silicone bordered foam dressings. Naren with T for Treatment and change every other day or PRN  - Pain left foot 1st and 2nd digits with betadine daily  - See \"at risk for skin breakdown\" for preventive care  - Should follow-up closely with podiatry in the outpatient setting after [facility] discharge, last outpatient documentation appears to be from 2023  Hyponatremia  > 4/14: Na 129 - A&O x3, pt mentating appropriately    - Trend on BMP  - Electrolyte management per nephrology  - Continue HD in setting of ESRD  ESRD (end stage renal disease) ([facility])  - Continue HD  - " Nephrology consulted and following, appreciate their recommendations  Paroxysmal atrial fibrillation (HCC)  - Currently rate controlled  - Embolic stroke risk reduction with apixaban 5 mg BID  Chronic diastolic (congestive) heart failure (HCC)  Wt Readings from Last 3 Encounters:   04/14/25 99.1 kg (218 lb 6.4 oz)   04/11/25 98.5 kg (217 lb 2.5 oz)   03/24/25 107 kg (234 lb 12.6 oz)     - Most recent TTE from 10/9/24 with EF 60-65%, grade 1 diastolic dysfunction  - Volume management with HD per nephrology  - Monitor weights  - Appreciate medicine and nephrology recommendations and management  Primary hypertension  - Nifedipine  - Appreciate IM and nephrology management  Gout  - Allopurinol for flare prophylaxis  Hyperphosphatemia  - Management per nephrology  - Continue phosphate binders  Wound of skin  - Has right anterior pre-tibial scab, wash gently daily, leave open to air  - Wound care RN consulted and following  At high risk for skin breakdown  - Moisturize skin daily with skin nourishing cream  - Ehob cushion in chair when out of bed.  - Preventative hydraguard to bilateral heels BID and PRN.   - Calazime paste to sacrum/buttocks BID and PRN  - Monitor clinically for breakdown, frequent turns  - Wound care RN consulted and following    At risk for venous thromboembolism (VTE)  - Apixaban, see above    Subjective   Patient is a 63 yo male with PMHx ESRD on HD, anemia, CHF, HTN, HLD, T2DM, who presented to [facility] on 4/2 for progressively worsening low back pain after a mechanical fall at home a few days prior to arrival. He was found with a large retroperitoneal hematoma with active extravasation. He was treated with Kcentra and a transfusion. He was then transferred to [facility] on 4/3 for IR evaluation, now s/p embolization 4/3. Hospital course c/b by acute drop in Hgb and a pseudoaneurysm of inferior gluteal artery, now s/p IR pelvic angiogram 4/7. He was restarted on his home anticoagulation and has been seen  by APS for pain control prior to [facility] admission.    Chief Complaint: f/u fracture and f/u ambulatory dysfunction    Interval: Patient seen and examined. No acute overnight events. Patient doing well this morning and tolerating therapy. He states that his biggest worry is the vision change/floater he is having in his R eye. All questions answered and emotional counseling provided, with patient feeling better. Later in the afternoon, pt reports he is in significant pain. Meds and imaging reviewed. Advised him that we can get additional imaging if warranted for his acute pain but that last imaging was stable/improved and lab work today looks good. He was agreeable to therapy afterwards. Labs reviewed today and notable for : Hgb 7.6, Na 129, Cr 6.16      Objective :  Temp:  [97.6 °F (36.4 °C)-99.4 °F (37.4 °C)] 97.8 °F (36.6 °C)  HR:  [55-64] 55  BP: (131-143)/(54-61) 143/54  Resp:  [17-18] 17  SpO2:  [97 %-100 %] 97 %  O2 Device: None (Room air)    Functional Update:  Mobility: supervision bed mobility and totalA ambulation with RW and chair follow (150')  Transfers: min-mod A transfers  ADLs: min-modA bathing/UBD, mod-maxA LBD    Physical Exam  Vitals reviewed.   Constitutional:       Appearance: Normal appearance.   HENT:      Head: Normocephalic and atraumatic.      Right Ear: External ear normal.      Left Ear: External ear normal.      Nose: Nose normal.      Mouth/Throat:      Mouth: Mucous membranes are moist.      Pharynx: Oropharynx is clear.   Eyes:      Conjunctiva/sclera: Conjunctivae normal.   Cardiovascular:      Rate and Rhythm: Normal rate and regular rhythm.      Heart sounds: Normal heart sounds.   Pulmonary:      Effort: Pulmonary effort is normal.      Breath sounds: Normal breath sounds. No wheezing, rhonchi or rales.   Abdominal:      General: Bowel sounds are normal. There is no distension.      Palpations: Abdomen is soft.   Musculoskeletal:      Cervical back: Normal range of motion.   Skin:      General: Skin is warm and dry.   Neurological:      Mental Status: He is alert. Mental status is at baseline.   Psychiatric:         Mood and Affect: Mood normal.         Behavior: Behavior normal.           Scheduled Meds:  Current Facility-Administered Medications   Medication Dose Route Frequency Provider Last Rate    acetaminophen  975 mg Oral Q8H [facility] Damian Cerda MD      allopurinol  100 mg Oral Daily Damian Cerda MD      apixaban  5 mg Oral BID Damian Cerda MD      atorvastatin  80 mg Oral Daily With Dinner Damian Cerda MD      epoetin jessica  3,000 Units Intravenous After Dialysis Damian Cerda MD      And    epoetin jessica  2,000 Units Intravenous After Dialysis Damian Cerda MD      gabapentin  100 mg Oral Once per day on Monday Wednesday Friday Damian Cerda MD      lidocaine  1 patch Topical Daily PRN Damian Cerda MD      melatonin  6 mg Oral HS Jayden Rowland MD      methocarbamol  1,000 mg Oral Q8H [facility] Jayden Rowland MD      naloxone  0.04 mg Intravenous Q1MIN PRN Damian Cerda MD      NIFEdipine  30 mg Oral After Dinner Damian Cerda MD      oxyCODONE  5 mg Oral Q4H PRN Damian Cerda MD      Or    oxyCODONE  10 mg Oral Q4H PRN Damian Cerda MD      oxyCODONE  2.5 mg Oral Q8H PRN Jayden Rowland MD      senna-docusate sodium  1 tablet Oral HS Damian Cerda MD      sevelamer  1,600 mg Oral TID With Meals Damian Cerda MD           Lab Results: I have reviewed the following results:  Results from last 7 days   Lab Units 04/14/25  0611 04/13/25  1307 04/11/25  0508 04/10/25  0820   HEMOGLOBIN g/dL 7.6* 8.0* 7.4* 7.4*   HEMATOCRIT % 24.7* 25.4* 25.0* 23.7*   WBC Thousand/uL 9.11  --  7.55 7.81   PLATELETS Thousands/uL 336  --  378 349     Results from last 7 days   Lab Units 04/14/25  0611 04/11/25  0508 04/10/25  0820   BUN mg/dL 39* 30* 52*   SODIUM mmol/L 129* 133* 132*   POTASSIUM mmol/L 4.7 4.4 4.4   CHLORIDE mmol/L 93* 96 93*   CREATININE mg/dL 6.16* 4.63* 7.50*

## 2025-04-15 ENCOUNTER — APPOINTMENT (INPATIENT)
Dept: DIALYSIS | Facility: HOSPITAL | Age: 65
DRG: 939 | End: 2025-04-15
Attending: INTERNAL MEDICINE
Payer: MEDICARE

## 2025-04-15 LAB
ANION GAP SERPL CALCULATED.3IONS-SCNC: 12 MMOL/L (ref 4–13)
BUN SERPL-MCNC: 55 MG/DL (ref 5–25)
CALCIUM SERPL-MCNC: 8.9 MG/DL (ref 8.4–10.2)
CHLORIDE SERPL-SCNC: 89 MMOL/L (ref 96–108)
CO2 SERPL-SCNC: 24 MMOL/L (ref 21–32)
CREAT SERPL-MCNC: 7.54 MG/DL (ref 0.6–1.3)
ERYTHROCYTE [DISTWIDTH] IN BLOOD BY AUTOMATED COUNT: 17.2 % (ref 11.6–15.1)
GFR SERPL CREATININE-BSD FRML MDRD: 6 ML/MIN/1.73SQ M
GLUCOSE SERPL-MCNC: 91 MG/DL (ref 65–140)
HCT VFR BLD AUTO: 22.8 % (ref 36.5–49.3)
HGB BLD-MCNC: 7.2 G/DL (ref 12–17)
MCH RBC QN AUTO: 29.4 PG (ref 26.8–34.3)
MCHC RBC AUTO-ENTMCNC: 31.6 G/DL (ref 31.4–37.4)
MCV RBC AUTO: 93 FL (ref 82–98)
PHOSPHATE SERPL-MCNC: 4.6 MG/DL (ref 2.3–4.1)
PLATELET # BLD AUTO: 318 THOUSANDS/UL (ref 149–390)
PMV BLD AUTO: 9.1 FL (ref 8.9–12.7)
POTASSIUM SERPL-SCNC: 4.7 MMOL/L (ref 3.5–5.3)
RBC # BLD AUTO: 2.45 MILLION/UL (ref 3.88–5.62)
SODIUM SERPL-SCNC: 125 MMOL/L (ref 135–147)
WBC # BLD AUTO: 8.35 THOUSAND/UL (ref 4.31–10.16)

## 2025-04-15 PROCEDURE — 99232 SBSQ HOSP IP/OBS MODERATE 35: CPT | Performed by: NURSE PRACTITIONER

## 2025-04-15 PROCEDURE — 97530 THERAPEUTIC ACTIVITIES: CPT

## 2025-04-15 PROCEDURE — 85027 COMPLETE CBC AUTOMATED: CPT | Performed by: INTERNAL MEDICINE

## 2025-04-15 PROCEDURE — 97116 GAIT TRAINING THERAPY: CPT

## 2025-04-15 PROCEDURE — 84100 ASSAY OF PHOSPHORUS: CPT | Performed by: INTERNAL MEDICINE

## 2025-04-15 PROCEDURE — 99232 SBSQ HOSP IP/OBS MODERATE 35: CPT | Performed by: INTERNAL MEDICINE

## 2025-04-15 PROCEDURE — 97110 THERAPEUTIC EXERCISES: CPT

## 2025-04-15 PROCEDURE — 99233 SBSQ HOSP IP/OBS HIGH 50: CPT | Performed by: STUDENT IN AN ORGANIZED HEALTH CARE EDUCATION/TRAINING PROGRAM

## 2025-04-15 PROCEDURE — 5A1D70Z PERFORMANCE OF URINARY FILTRATION, INTERMITTENT, LESS THAN 6 HOURS PER DAY: ICD-10-PCS | Performed by: INTERNAL MEDICINE

## 2025-04-15 PROCEDURE — 80048 BASIC METABOLIC PNL TOTAL CA: CPT | Performed by: INTERNAL MEDICINE

## 2025-04-15 PROCEDURE — 97535 SELF CARE MNGMENT TRAINING: CPT

## 2025-04-15 RX ORDER — BISACODYL 10 MG
10 SUPPOSITORY, RECTAL RECTAL DAILY PRN
Status: DISCONTINUED | OUTPATIENT
Start: 2025-04-15 | End: 2025-05-07 | Stop reason: HOSPADM

## 2025-04-15 RX ORDER — GUAIFENESIN 200 MG/10ML
LIQUID ORAL 3 TIMES DAILY PRN
Status: DISCONTINUED | OUTPATIENT
Start: 2025-04-15 | End: 2025-05-07 | Stop reason: HOSPADM

## 2025-04-15 RX ORDER — SENNOSIDES 8.6 MG
2 TABLET ORAL
Status: DISCONTINUED | OUTPATIENT
Start: 2025-04-15 | End: 2025-05-07 | Stop reason: HOSPADM

## 2025-04-15 RX ORDER — DOCUSATE SODIUM 100 MG/1
100 CAPSULE, LIQUID FILLED ORAL 2 TIMES DAILY
Status: DISCONTINUED | OUTPATIENT
Start: 2025-04-15 | End: 2025-05-07 | Stop reason: HOSPADM

## 2025-04-15 RX ORDER — POLYETHYLENE GLYCOL 3350 17 G/17G
17 POWDER, FOR SOLUTION ORAL DAILY PRN
Status: DISCONTINUED | OUTPATIENT
Start: 2025-04-15 | End: 2025-05-07 | Stop reason: HOSPADM

## 2025-04-15 RX ADMIN — OXYCODONE HYDROCHLORIDE 10 MG: 10 TABLET ORAL at 17:46

## 2025-04-15 RX ADMIN — APIXABAN 5 MG: 5 TABLET, FILM COATED ORAL at 17:33

## 2025-04-15 RX ADMIN — EPOETIN ALFA 8000 UNITS: 4000 SOLUTION INTRAVENOUS; SUBCUTANEOUS at 15:28

## 2025-04-15 RX ADMIN — METHOCARBAMOL 1000 MG: 500 TABLET ORAL at 06:10

## 2025-04-15 RX ADMIN — ATORVASTATIN CALCIUM 80 MG: 80 TABLET, FILM COATED ORAL at 17:32

## 2025-04-15 RX ADMIN — OXYCODONE HYDROCHLORIDE 10 MG: 10 TABLET ORAL at 12:36

## 2025-04-15 RX ADMIN — ACETAMINOPHEN 975 MG: 325 TABLET, FILM COATED ORAL at 21:50

## 2025-04-15 RX ADMIN — NIFEDIPINE 30 MG: 30 TABLET, FILM COATED, EXTENDED RELEASE ORAL at 17:33

## 2025-04-15 RX ADMIN — Medication 6 MG: at 21:51

## 2025-04-15 RX ADMIN — SEVELAMER HYDROCHLORIDE 1600 MG: 800 TABLET ORAL at 11:42

## 2025-04-15 RX ADMIN — ACETAMINOPHEN 975 MG: 325 TABLET, FILM COATED ORAL at 06:10

## 2025-04-15 RX ADMIN — OXYCODONE HYDROCHLORIDE 10 MG: 10 TABLET ORAL at 21:50

## 2025-04-15 RX ADMIN — APIXABAN 5 MG: 5 TABLET, FILM COATED ORAL at 08:04

## 2025-04-15 RX ADMIN — DOCUSATE SODIUM 100 MG: 100 CAPSULE, LIQUID FILLED ORAL at 17:32

## 2025-04-15 RX ADMIN — OXYCODONE HYDROCHLORIDE 10 MG: 10 TABLET ORAL at 03:57

## 2025-04-15 RX ADMIN — ALLOPURINOL 100 MG: 100 TABLET ORAL at 08:04

## 2025-04-15 RX ADMIN — METHOCARBAMOL 1000 MG: 500 TABLET ORAL at 21:51

## 2025-04-15 RX ADMIN — SEVELAMER HYDROCHLORIDE 1600 MG: 800 TABLET ORAL at 08:05

## 2025-04-15 RX ADMIN — SEVELAMER HYDROCHLORIDE 1600 MG: 800 TABLET ORAL at 17:32

## 2025-04-15 NOTE — PROGRESS NOTES
04/15/25 1000   Pain Assessment   Pain Assessment Tool 0-10   Pain Score 5   Pain Location/Orientation Orientation: Lower;Location: Back;Orientation: Right;Location: Abdomen   Hospital Pain Intervention(s) Repositioned;Rest   Restrictions/Precautions   Precautions Bed/chair alarms;Fall Risk;Supervision on toilet/commode;Visual deficit;Fluid restriction   RLE Weight Bearing Per Order WBAT   LLE Weight Bearing Per Order   WBAT   Braces or Orthoses LSO   Sit to Stand   Type of Assistance Needed Incidental touching;Supervision;Adaptive equipment   Physical Assistance Level No physical assistance   Comment CG/CS with RW   Sit to Stand CARE Score 4   Bed-Chair Transfer   Type of Assistance Needed Incidental touching;Adaptive equipment   Physical Assistance Level No physical assistance   Comment CGA with RW   Chair/Bed-to-Chair Transfer CARE Score 4   Car Transfer   Type of Assistance Needed Incidental touching;Adaptive equipment   Car Transfer CARE Score 4   Walk 10 Feet   Type of Assistance Needed Supervision;Adaptive equipment   Comment CS with RW walking to bathroom   Walk 10 Feet CARE Score 4   Walk 50 Feet with Two Turns   Type of Assistance Needed Incidental touching;Adaptive equipment   Physical Assistance Level No physical assistance   Comment CGA with RW   Walk 50 Feet with Two Turns CARE Score 4   Walk 150 Feet   Type of Assistance Needed Incidental touching;Adaptive equipment   Comment CGA with RW   Walk 150 Feet CARE Score 4   Walking 10 Feet on Uneven Surfaces   Type of Assistance Needed Incidental touching;Adaptive equipment   Comment CGA with RW across mat on floor   Walking 10 Feet on Uneven Surfaces CARE Score 4   Ambulation   Primary Mode of Locomotion Prior to Admission Walk   Distance Walked (feet) 150 ft  (x3)   Assist Device Roller Walker   Gait Pattern Slow Lisa;Step through;Forward Flexion;Wide GARO   Limitations Noted In Balance;Heel Strike;Posture   Findings Pt wearing hospital socks only,  "unable to shawna sneakers due to edema   Does the patient walk? 2. Yes   Wheel 50 Feet with Two Turns   Reason if not Attempted Activity not applicable   Wheel 50 Feet with Two Turns CARE Score 9   Wheel 150 Feet   Reason if not Attempted Activity not applicable   Wheel 150 Feet CARE Score 9   Curb or Single Stair   Style negotiated Curb   Type of Assistance Needed Physical assistance;Adaptive equipment   Physical Assistance Level 25% or less   Comment 6\" curb step x 3 with RW, first attempt had slight posterior lean, leads with LLE up and down   1 Step (Curb) CARE Score 3   4 Steps   Type of Assistance Needed Incidental touching   Physical Assistance Level No physical assistance   Comment CGA on  steps w/ B HR, completed 6 steps   4 Steps CARE Score 4   12 Steps   Reason if not Attempted Activity not applicable   12 Steps CARE Score 9   Toilet Transfer   Findings walked into bathroom to urinate while standing at toilet with RW, performed with CS/CGA   Therapeutic Interventions   Strengthening LAQ 4 x 10   Balance walking around short distances working on turns with RW   Equipment Use   NuStep requested to not attempt due to lack of ROM in ankles causing pain   Other Comments   Comments Educated pt on using RW at all times, indoor and outdoor for incresaed balance and safety. Also discussed use of walker tray vs carrying items and furniture walking. Pt agreeable to try and to work in kitchen future sessions, OTR made aware.   Assessment   Treatment Assessment Pt seen to work on functional mobility, strengthening and safety. Discussed potential use  of W/c in home for fall prevention. Pt reports hoe too small, but agreeable to advise of using RW at all times. Will benefit from continued stair training, curb with Rw and walking on uneven surfaces for fall prevention in future.   Plan   Progress Progressing toward goals   PT Therapy Minutes   PT Time In 1000   PT Time Out 1130   PT Total Time (minutes) 90   PT " Mode of treatment - Individual (minutes) 90   PT Mode of treatment - Concurrent (minutes) 0   PT Mode of treatment - Group (minutes) 0   PT Mode of treatment - Co-treat (minutes) 0   PT Mode of Treatment - Total time(minutes) 90 minutes   PT Cumulative Minutes 210   Therapy Time missed   Time missed? No

## 2025-04-15 NOTE — PROGRESS NOTES
Progress Note - PMR   Name: Naresh Carpio 64 y.o. male I MRN: [id number]  Unit/Bed#: -01 I Date of Admission: 4/11/2025   Date of Service: 4/15/2025 I Hospital Day: 4     Assessment & Plan  Traumatic retroperitoneal hematoma  - Sustained from mechanical fall several days prior to initial presentation on 4/2/25, presenting with progressively worsening low back pain. Trauma imaging revealed a large retroperitoneal hematoma with active extravasation and hemoglobin drop. Now s/p transfusion, Kcentra and ultimately IR embolization of the right deep circumflex iliac artery and superior gluteal artery with Dr. Garcia on 4/3/25. Unfortunately, on 4/6/25, his hemoglobin decreased and repeat CT showed a 5 mm inferior gluteal artery branch pseudoaneurysm and he is s/p IR pelvic angiogram on 4/7/25 with Dr. Hernandez, which demonstrated pseudoaneurysms at the distal branches of the left inferior gluteal artery which was embolized.   > 4/15: Hgb 7.2 today (from 7.6 yesterday). Back pain seems controlled. Will be receiving increased Epogen today per IM/Nephro    - Continue to trend Hgb on CBC closely  - Consider repeat imaging if Hgb continues to downtrend or pain significantly worsens  - Contact IR if there are additional bleeding concerns given multiple prior interventions described above  - Local right hip/groin access site incision care  - Analgesia, see below  - PT and OT  Acute pain due to trauma  - Evaluated by APS on acute care  - Tylenol 975 mg q8h scheduled  - Gabapentin 100 mg three times weekly  - Methocarbamol 750 mg q8h scheduled  - Lidocaine patches daily PRN  - Oxycodone 5 or 10 mg q4h PRN, wean as possible  Impaired mobility and activities of daily living  - Rehabilitation medicine physician for daily monitoring of care, 24 hour availability for acute medical issues, medication management, and therapeutic and diagnostic assessments.  - 24 hour rehabilitation nursing 7 days per week for: management/teaching of  medications, bowel/bladder routine, skin care.  - PT, OT for 2-3 hours per day, 5 to 6 days per week; 15 hours per week  - Rehabilitation Psychology as needed for adjustment and coping  - CM for barriers to discharge, community resources, and family support  - Discharge planning following to help ensure a safe and efficient discharge  - 900/7 program due to HD status, pain, expected therapy tolerance    L2 vertebral fracture (HCC)  > Sustained from a mechanical fall on 3/24/25, found to have an acute, obliquely oriented fracture of the L2 vertebral body with distraction of the dominant fracture fragment and involvement of the anterior and posterior cortices and superior endplate  > At that time, managed non-operatively with LSO bracing, though it does not appear orthopedic spine surgery or neurosurgery evaluated at the time of injury    - Maintain LSO brace with lumbar spinal precautions   - Should follow-up with spine surgery outpatient  Closed fracture of proximal end of left humerus with routine healing  - Sustained from fall in early February, per CT of the LUE on 2/9/25, there was a minimally displaced fracture of the anterior facet of the greater tuberosity of the left humerus, minimally apparent on repeat x-ray of the shoulder on 3/24/25  - He was evaluated by orthopedic surgery at the time of the initial injury and was recommended to maintain NWB through the LUE with a sling  - There is no further orthopedic evaluation documentation and it does not appear that further follow-up occurred.  - Clinically, he is not describing ongoing significant left arm or shoulder pain and has been weight-bearing through the LUE  > 4/14: Repeat humerus X-ray done yesterday shows no change in fracture alignment. Ortho consulted.  >4/15: Ortho c/s: May WBAT to LUE. PT/OT to increase strength and ROM. F/u with Dr. Cisneros in 4 weeks    - Monitor  - PT/OT  Insomnia  Patient reports he has an overall a poor sleep hygiene: he  "sleeps at 1-2am nightly and watches TV up until bed.  Advised patient to attempt to regulate sleep/wake cycle while here to better participate in therapy. Attempt to try to sleep earlier around midnight  >4/15: reviewed sleep log: Pt went to sleep around midnight and slept for 6 hours.    - Continue sleep log  - Continue melatonin 6mg nightly  At risk for constipation  > Last BM 4/11   4/15- Adjusted bowel medications: Colace 100mg BID, Senna 17.2mg with lunch and PRN Miralax/Suppos  - Monitor closely for constipation and/or incontinence. Will follow BMs and adjust bowel regimen to target soft, formed stools q1-2 days, per pt's regular schedule.  Anemia due to chronic kidney disease, on chronic dialysis (HCC)  > 4/14: Hgb 7.6  > 4/15: Hgb 7.2 - Discussed with IM who is coordinating with Nephrology; they will increase his Epoetin jessica    - Trend Hgb on CBC  - Epoetin jessica with dialysis  - Transfusions per IM, appreciate their recommendations  Visual disturbance  - Reporting \"floater\" within the right eye with acute onset on 4/11/25 at mid-day while watching TV; describes a dark, \"spider\"-shaped floater that occupies a significant portion of the visual field, painless; has similar floaters in the left eye but smaller  - Ophthalmology consulted on [facility] admission by IM, per prior trauma surgery documentation, Dr. Chaudrhy of ophthalmology was made personally aware of clinical situation on 4/11/25 prior to [facility] admission  > Ophthalmology consult 4/12: Exam c/w proliferative diabetic retinopathy with vitreous hemorrhage. Pt will follow up with ophtho immediately after discharge and for referral to a retina specialist  > 4/15- Pt thinks his R eye floater may be larger today, unable to clearly state or quantify symptoms. No other visual complaints of increased number of floaters, double/blurry vision, or bright flashes. Advised patient to inform us if symptoms change - will reach out to Ophthalmology accordingly    - Monitor " "for changes in vision  Diabetic ulcer of left midfoot associated with type 2 diabetes mellitus, limited to breakdown of skin (HCC)  - Has chronic wounds affecting the left foot plantar surface, left foot 1st and 2nd digits  - For left medial foot, left plantar surface: cleanse with NSS and pat dry. Apply dermagran to plantar wound only. Cover both wounds with silicone bordered foam dressings. Naren with T for Treatment and change every other day or PRN  - Pain left foot 1st and 2nd digits with betadine daily  - See \"at risk for skin breakdown\" for preventive care  - Should follow-up closely with podiatry in the outpatient setting after [facility] discharge, last outpatient documentation appears to be from 2023  Hyponatremia  > 4/14: Na 129 - A&O x3, pt mentating appropriately  > 4/15: Na 125 - Discussed with IM who is coordinating with Nephrology; they will manage Na during HD     - Trend on BMP  - Electrolyte management per nephrology  - Continue HD in setting of ESRD  ESRD (end stage renal disease) (HCC)  - Continue HD  - Nephrology consulted and following, appreciate their recommendations  Paroxysmal atrial fibrillation (HCC)  - Currently rate controlled  - Embolic stroke risk reduction with apixaban 5 mg BID  Chronic diastolic (congestive) heart failure (HCC)  Wt Readings from Last 3 Encounters:   04/15/25 99.9 kg (220 lb 3.2 oz)   04/11/25 98.5 kg (217 lb 2.5 oz)   03/24/25 107 kg (234 lb 12.6 oz)     - Most recent TTE from 10/9/24 with EF 60-65%, grade 1 diastolic dysfunction  - Volume management with HD per nephrology  - Monitor weights  - Appreciate medicine and nephrology recommendations and management  Primary hypertension  - Nifedipine  - Appreciate IM and nephrology management  Gout  - Allopurinol for flare prophylaxis  Hyperphosphatemia  - Management per nephrology  - Continue phosphate binders  Wound of skin  - Has right anterior pre-tibial scab, wash gently daily, leave open to air  - Wound care RN consulted " and following  At high risk for skin breakdown  - Moisturize skin daily with skin nourishing cream  - Ehob cushion in chair when out of bed.  - Preventative hydraguard to bilateral heels BID and PRN.   - Calazime paste to sacrum/buttocks BID and PRN  - Monitor clinically for breakdown, frequent turns  - Wound care RN consulted and following  At risk for venous thromboembolism (VTE)  - Apixaban, see above    Subjective   Patient is a 63 yo male with PMHx ESRD on HD, anemia, CHF, HTN, HLD, T2DM, who presented to [facility] on 4/2 for progressively worsening low back pain after a mechanical fall at home a few days prior to arrival. He was found with a large retroperitoneal hematoma with active extravasation. He was treated with Kcentra and a transfusion. He was then transferred to [facility] on 4/3 for IR evaluation, now s/p embolization 4/3. Hospital course c/b by acute drop in Hgb and a pseudoaneurysm of inferior gluteal artery, now s/p IR pelvic angiogram 4/7. He was restarted on his home anticoagulation and has been seen by APS for pain control prior to [facility] admission.     Chief Complaint: f/u fracture and f/u ambulatory dysfunction    Interval: Patient seen and examined. No acute overnight events. Patient told PT that his R eye floater got bigger today. On evaluation, pt reports that he thinks the floater in his R eye may be slightly bigger this morning, but he's not completely sure. He denies any additional floaters, double vision, blurry vision or bright flashes of light. Pt states that he will inform us if he notices a progression or any new symptoms. Last BM 4/11- will adjust bowel meds. Labs reviewed today and notable for: Hgb 7.2 and Na 125      Objective :  Temp:  [97.6 °F (36.4 °C)-98.2 °F (36.8 °C)] 97.6 °F (36.4 °C)  HR:  [56-70] 56  BP: (134-156)/(49-67) 134/62  Resp:  [16-17] 17  SpO2:  [94 %-97 %] 96 %  O2 Device: None (Room air)    Functional Update:  Mobility: supervision bed mobility and totalA ambulation  with RW and chair follow (150')  Transfers: CG-min-modA transfers with RW  ADLs: Ind eating, Rin bathing/UBD, mod-maxA LBD, totalA footwear    Physical Exam  Vitals reviewed.   Constitutional:       Appearance: Normal appearance.   HENT:      Head: Normocephalic and atraumatic.   Cardiovascular:      Rate and Rhythm: Normal rate and regular rhythm.      Heart sounds: Normal heart sounds.   Pulmonary:      Effort: Pulmonary effort is normal. No respiratory distress.      Breath sounds: Normal breath sounds. No wheezing, rhonchi or rales.   Abdominal:      General: There is no distension.      Palpations: Abdomen is soft.      Comments: Hypoactive bowel sounds   Musculoskeletal:      Cervical back: Normal range of motion.      Right lower leg: Edema present.      Left lower leg: Edema present.      Comments: +LSO brace   Skin:     General: Skin is warm and dry.   Neurological:      Mental Status: He is alert and oriented to person, place, and time. Mental status is at baseline.           Scheduled Meds:  Current Facility-Administered Medications   Medication Dose Route Frequency Provider Last Rate    acetaminophen  975 mg Oral Q8H [facility] Damian Cerda MD      allopurinol  100 mg Oral Daily Damian Cerda MD      apixaban  5 mg Oral BID Damian Cerda MD      atorvastatin  80 mg Oral Daily With Dinner Damian Cerda MD      bisacodyl  10 mg Rectal Daily PRN Michelle ABURTO Landa, DO      docusate sodium  100 mg Oral BID Michelle T Landa, DO      epoetin jessica  8,000 Units Intravenous After Dialysis German Linton MD      gabapentin  100 mg Oral Once per day on Monday Wednesday Friday Damian Cerda MD      lidocaine  1 patch Topical Daily PRN Damian Cerda MD      melatonin  6 mg Oral HS Jayden Rowland MD      methocarbamol  1,000 mg Oral Q8H [facility] Jayden Rowland MD      naloxone  0.04 mg Intravenous Q1MIN PRN Damian Cerda MD      NIFEdipine  30 mg Oral After Dinner Damian Cerda MD      oxyCODONE  5 mg Oral Q4H PRN Damian  MD Prashant      Or    oxyCODONE  10 mg Oral Q4H PRN Damian Cerda MD      oxyCODONE  2.5 mg Oral Q8H PRN Jayden Rowland MD      polyethylene glycol  17 g Oral Daily PRN Michelleaura Landa, DO      senna  2 tablet Oral Daily With Lunch Michelle Landa, DO      sevelamer  1,600 mg Oral TID With Meals Damian Cerda MD           Lab Results: I have reviewed the following results:  Results from last 7 days   Lab Units 04/15/25  0428 04/14/25  0611 04/13/25  1307 04/11/25  0508   HEMOGLOBIN g/dL 7.2* 7.6* 8.0* 7.4*   HEMATOCRIT % 22.8* 24.7* 25.4* 25.0*   WBC Thousand/uL 8.35 9.11  --  7.55   PLATELETS Thousands/uL 318 336  --  378     Results from last 7 days   Lab Units 04/15/25  0447 04/14/25  0611 04/11/25  0508   BUN mg/dL 55* 39* 30*   SODIUM mmol/L 125* 129* 133*   POTASSIUM mmol/L 4.7 4.7 4.4   CHLORIDE mmol/L 89* 93* 96   CREATININE mg/dL 7.54* 6.16* 4.63*               Estlander Flap (Upper To Lower Lip) Text: The defect of the lower lip was assessed and measured.  Given the location and size of the defect, an Estlander flap was deemed most appropriate.  Using a sterile surgical marker, an appropriate Estlander flap was measured and drawn on the upper lip. Local anesthesia was then infiltrated. A scalpel was then used to incise the lateral aspect of the flap, through skin, muscle and mucosa, leaving the flap pedicled medially.  The flap was then rotated and positioned to fill the lower lip defect.  The flap was then sutured into place with a three layer technique, closing the orbicularis oris muscle layer with subcutaneous buried sutures, followed by a mucosal layer and an epidermal layer.

## 2025-04-15 NOTE — ASSESSMENT & PLAN NOTE
This is due to lack of free water clearance and probably excessive fluid intake  Fluid restrict 1200 cc per 24 hours  138 mEq sodium bath on dialysis today  Ultrafiltration with dialysis will also help with correction of hyponatremia

## 2025-04-15 NOTE — HEMODIALYSIS
Post-Dialysis RN Treatment Note    Blood Pressure:  Pre 141/63 mm/Hg  Post 134/69 mmHg   EDW:  97 kg    Weight:  Pre 100.4 kg   Post 97.2 kg   Mode of weight measurement: Standing Scale   Volume Removed:  3200 ml    Treatment duration: 225 minutes    NS given:  No    Treatment shortened Yes, describe: 15 minutes early per patient request   Medications given during Rx: Epogen 8000 units   Estimated Kt/V:  1.12   Access type: Permacath/TDC   Needle Gauge:  N/A   Access Issues: No    Report called to primary nurse:   Yes Lilian MULLINS RN

## 2025-04-15 NOTE — ASSESSMENT & PLAN NOTE
> Last BM 4/11   4/15- Adjusted bowel medications: Colace 100mg BID, Senna 17.2mg with lunch and PRN Miralax/Suppos  - Monitor closely for constipation and/or incontinence. Will follow BMs and adjust bowel regimen to target soft, formed stools q1-2 days, per pt's regular schedule.

## 2025-04-15 NOTE — ASSESSMENT & PLAN NOTE
> 4/14: Hgb 7.6  > 4/15: Hgb 7.2 - Discussed with IM who is coordinating with Nephrology; they will increase his Epoetin jessica    - Trend Hgb on CBC  - Epoetin jessica with dialysis  - Transfusions per IM, appreciate their recommendations

## 2025-04-15 NOTE — ASSESSMENT & PLAN NOTE
> 4/14: Na 129 - A&O x3, pt mentating appropriately  > 4/15: Na 125 - Discussed with IM who is coordinating with Nephrology; they will manage Na during HD     - Trend on BMP  - Electrolyte management per nephrology  - Continue HD in setting of ESRD

## 2025-04-15 NOTE — ASSESSMENT & PLAN NOTE
"- Reporting \"floater\" within the right eye with acute onset on 4/11/25 at mid-day while watching TV; describes a dark, \"spider\"-shaped floater that occupies a significant portion of the visual field, painless; has similar floaters in the left eye but smaller  - Ophthalmology consulted on [facility] admission by IM, per prior trauma surgery documentation, Dr. Chaudhry of ophthalmology was made personally aware of clinical situation on 4/11/25 prior to [facility] admission  > Ophthalmology consult 4/12: Exam c/w proliferative diabetic retinopathy with vitreous hemorrhage. Pt will follow up with ophtho immediately after discharge and for referral to a retina specialist  > 4/15- Pt thinks his R eye floater may be larger today, unable to clearly state or quantify symptoms. No other visual complaints of increased number of floaters, double/blurry vision, or bright flashes. Advised patient to inform us if symptoms change - will reach out to Ophthalmology accordingly    - Monitor for changes in vision  "

## 2025-04-15 NOTE — PROGRESS NOTES
"Progress Note - Hospitalist   Name: Naresh Carpio 64 y.o. male I MRN: [id number]  Unit/Bed#: -01 I Date of Admission: 4/11/2025   Date of Service: 4/15/2025 I Hospital Day: 4    Assessment & Plan  Traumatic retroperitoneal hematoma  Admitted to the trauma service after a fall with L2 vertebral body and retroperitoneal hematoma.  S/p IR embolization of distal right deep circumflex iliac artery/distal right superior gluteal artery both of which supplied an area of active extravasation and then pseudoaneurysms present at distal branches of the left inferior gluteal artery. The artery was embolized using coils and gelfoam.   Hemoglobin stable  Anemia due to chronic kidney disease, on chronic dialysis ([facility])  Had 5 U PRBCs during hospital stay  Hemoglobin has been running mostly 7.3-7.4.    Yesterday 4/14/25 was 7.6; today 4/15/24 is 7.2.  Renal is to increase his Epogen today in light of his hemoglobin  ESRD (end stage renal disease) ([facility])  Continue Sevelamer TID with meals  Renal following  HD T-TH-Sat while here in rehab  Paroxysmal atrial fibrillation ([facility])  On no rate control medications  Examines RRR  Anticoagulation with Eliquis as at home  Chronic diastolic (congestive) heart failure ([facility])  Appears euvolemic except for LE pitting edema which appears to be chronic  Renal diet  stable  Volume management via HD  Primary hypertension  Continue Procardia XL 30 mg qd  Tx per renal  Visual disturbance  Reported a right eye \"floater\" without associated pain earlier in his hospitalization which has resolved but continued to have blurry central vision.  Ophthalmology saw over the weekend = visual acuity without correction is 20/400 OU. Dx: Proliferative diabetic retinopathy with vitreous hemorrhage OU as well as cataracts.   Will need outpatient follow up immediately after discharge from rehab  Diabetic ulcer of left midfoot associated with type 2 diabetes mellitus, limited to breakdown of skin ([facility])  Continue " "local care  Hyponatremia  Renal following  Tx/adjust with HD  Closed fracture of proximal end of left humerus with routine healing  Occurred from a fall in February and eval'd at the time of the injury  Was to continue NWB/sling and followup with Ortho but there was no followup done  Xray done 4/13/25 = \"Unchanged alignment of the chronic greater tuberosity avulsion fracture\".   Ortho saw on 4/14 = WBAT; see Dr Cisneros 4 weeks  Gout  Continue allopurinol  L2 vertebral fracture (HCC)  CT CAP 4/2: Fracture of L2 vertebral body extending to the superior endplate  Continue LSO brace  Insomnia  Patient reports not sleeping well at night because he is awake worrying about his medical conditions  Discussed with PMR over the weekend and started melatonin    The above assessment and plan was reviewed and updated as determined by my evaluation of the patient on 4/15/2025.    History of Present Illness   Patient seen and examined. Patients overnight issues or events were reviewed with nursing staff. New or overnight issues include the following:   No new or overnight issues.  Offers no complaints.    Review of Systems   All other systems reviewed and are negative.      Objective :  Temp:  [97.6 °F (36.4 °C)-98.2 °F (36.8 °C)] 97.6 °F (36.4 °C)  HR:  [56-70] 56  BP: (134-156)/(49-67) 134/62  Resp:  [16-17] 17  SpO2:  [94 %-97 %] 96 %  O2 Device: None (Room air)    Invasive Devices       Peripheral Intravenous Line  Duration             Peripheral IV 04/13/25 Left;Ventral (anterior) Forearm 1 day              Hemodialysis Catheter  Duration             HD Permanent Double Catheter 594 days                    Physical Exam  General Appearance: no distress, nontoxic appearing  HEENT:  External ear normal.  Nose normal w/o drainage. Mucous membranes are moist. Oropharynx is clear. Conjunctiva clear w/o icterus or redness.  Neck:  Supple, normal ROM  Lungs: BBS without crackles/wheeze/rhonchi; respirations unlabored with normal " inspiratory/expiratory effort.  No retractions noted.  On RA  CV: regular rate and rhythm; no rubs/murmurs/gallops, PMI normal   ABD: Abdomen is soft.  Bowel sounds all quadrants.  Nontender with no distention.    EXT: + LE edema  Skin: normal turgor, normal texture  Psych: affect normal, mood normal  Neuro: AAO       The above physical exam was reviewed and updated as determined by my evaluation of the patient on 4/15/2025.      Lab Results: I have reviewed the following results:  Results from last 7 days   Lab Units 04/15/25  0428 04/14/25  0611   WBC Thousand/uL 8.35 9.11   HEMOGLOBIN g/dL 7.2* 7.6*   HEMATOCRIT % 22.8* 24.7*   PLATELETS Thousands/uL 318 336     Results from last 7 days   Lab Units 04/15/25  0447 04/14/25  0611   SODIUM mmol/L 125* 129*   POTASSIUM mmol/L 4.7 4.7   CHLORIDE mmol/L 89* 93*   CO2 mmol/L 24 26   BUN mg/dL 55* 39*   CREATININE mg/dL 7.54* 6.16*   CALCIUM mg/dL 8.9 9.1             Results from last 7 days   Lab Units 04/11/25  0714 04/10/25  1810 04/10/25  0708   POC GLUCOSE mg/dl 93 115 102       Imaging Results Review: No pertinent imaging studies reviewed.  Other Study Results Review: No additional pertinent studies reviewed.    Review of Scheduled Meds: Medications  reviewed and reconciled as needed  Current Facility-Administered Medications   Medication Dose Route Frequency Provider Last Rate    acetaminophen  975 mg Oral Q8H [facility] Damian Cerda MD      allopurinol  100 mg Oral Daily Damian Cerda MD      apixaban  5 mg Oral BID Damian Cerda MD      atorvastatin  80 mg Oral Daily With Dinner Damian Cerda MD      bisacodyl  10 mg Rectal Daily PRN Michelle T Landa, DO      docusate sodium  100 mg Oral BID Michelle T Landa, DO      epoetin jessica  8,000 Units Intravenous After Dialysis German Linton MD      gabapentin  100 mg Oral Once per day on Monday Wednesday Friday Damian Cerda MD      lidocaine  1 patch Topical Daily PRN Damian Cerda MD      melatonin  6 mg Oral HS Jayden  LEANA Rowland MD      methocarbamol  1,000 mg Oral Q8H KEMAL Jayden Rowland MD      naloxone  0.04 mg Intravenous Q1MIN PRN Damian Cerda MD      NIFEdipine  30 mg Oral After Dinner Damian Cerda MD      oxyCODONE  5 mg Oral Q4H PRN Damian Cerda MD      Or    oxyCODONE  10 mg Oral Q4H PRN Damian Cerda MD      oxyCODONE  2.5 mg Oral Q8H PRN Jayden Rowland MD      polyethylene glycol  17 g Oral Daily PRN Michelle T Landa, DO      senna  2 tablet Oral Daily With Lunch Michelle T Landa, DO      sevelamer  1,600 mg Oral TID With Meals Damian Cerda MD         VTE Pharmacologic Prophylaxis: Eliquis  Code Status: Level 1 - Full Code  Current Length of Stay: 4 day(s)    Administrative Statements     ** Please Note:  voice to text software may have been used in the creation of this document. Although proof errors in transcription or interpretation are a potential of such software**

## 2025-04-15 NOTE — ASSESSMENT & PLAN NOTE
> Sustained from a mechanical fall on 3/24/25, found to have an acute, obliquely oriented fracture of the L2 vertebral body with distraction of the dominant fracture fragment and involvement of the anterior and posterior cortices and superior endplate  > At that time, managed non-operatively with LSO bracing, though it does not appear orthopedic spine surgery or neurosurgery evaluated at the time of injury    - Maintain LSO brace with lumbar spinal precautions   - Should follow-up with spine surgery outpatient

## 2025-04-15 NOTE — ASSESSMENT & PLAN NOTE
Wt Readings from Last 3 Encounters:   04/15/25 99.9 kg (220 lb 3.2 oz)   04/11/25 98.5 kg (217 lb 2.5 oz)   03/24/25 107 kg (234 lb 12.6 oz)     - Most recent TTE from 10/9/24 with EF 60-65%, grade 1 diastolic dysfunction  - Volume management with HD per nephrology  - Monitor weights  - Appreciate medicine and nephrology recommendations and management

## 2025-04-15 NOTE — PLAN OF CARE
Problem: PAIN - ADULT  Goal: Verbalizes/displays adequate comfort level or baseline comfort level  Description: Interventions:- Encourage patient to monitor pain and request assistance- Assess pain using appropriate pain scale- Administer analgesics based on type and severity of pain and evaluate response- Implement non-pharmacological measures as appropriate and evaluate response- Consider cultural and social influences on pain and pain management- Notify physician/advanced practitioner if interventions unsuccessful or patient reports new pain  Outcome: Progressing     Problem: INFECTION - ADULT  Goal: Absence or prevention of progression during hospitalization  Description: INTERVENTIONS:- Assess and monitor for signs and symptoms of infection- Monitor lab/diagnostic results- Monitor all insertion sites, i.e. indwelling lines, tubes, and drains- Monitor endotracheal if appropriate and nasal secretions for changes in amount and color- [place] appropriate cooling/warming therapies per order- Administer medications as ordered- Instruct and encourage patient and family to use good hand hygiene technique- Identify and instruct in appropriate isolation precautions for identified infection/condition  Outcome: Progressing  Goal: Absence of fever/infection during neutropenic period  Description: INTERVENTIONS:- Monitor WBC  Outcome: Progressing     Problem: SAFETY ADULT  Goal: Patient will remain free of falls  Description: INTERVENTIONS:- Educate patient/family on patient safety including physical limitations- Instruct patient to call for assistance with activity - Consult OT/PT to assist with strengthening/mobility - Keep Call bell within reach- Keep bed low and locked with side rails adjusted as appropriate- Keep care items and personal belongings within reach- Initiate and maintain comfort rounds- Make Fall Risk Sign visible to staff- Offer Toileting every 2 Hours, in advance of need- Initiate/Maintain bed/chair  alarm- Obtain necessary fall risk management equipment: alarms - Apply yellow socks and bracelet for high fall risk patients- Consider moving patient to room near nurses station  Outcome: Progressing  Goal: Maintain or return to baseline ADL function  Description: INTERVENTIONS:-  Assess patient's ability to carry out ADLs; assess patient's baseline for ADL function and identify physical deficits which impact ability to perform ADLs (bathing, care of mouth/teeth, toileting, grooming, dressing, etc.)- Assess/evaluate cause of self-care deficits - Assess range of motion- Assess patient's mobility; develop plan if impaired- Assess patient's need for assistive devices and provide as appropriate- Encourage maximum independence but intervene and supervise when necessary- Involve family in performance of ADLs- Assess for home care needs following discharge - Consider OT consult to assist with ADL evaluation and planning for discharge- Provide patient education as appropriate  Outcome: Progressing  Goal: Maintains/Returns to pre admission functional level  Description: INTERVENTIONS:- Perform AM-PAC 6 Click Basic Mobility/ Daily Activity assessment daily.- Set and communicate daily mobility goal to care team and patient/family/caregiver. - Collaborate with rehabilitation services on mobility goals if consulted- Perform Range of Motion 3 times a day.- Reposition patient every 2 hours.- Dangle patient 3 times a day- Stand patient 3 times a day- Ambulate patient 3 times a day- Out of bed to chair 3 times a day - Out of bed for meals 3 times a day- Out of bed for toileting- Record patient progress and toleration of activity level   Outcome: Progressing     Problem: DISCHARGE PLANNING  Goal: Discharge to home or other facility with appropriate resources  Description: INTERVENTIONS:- Identify barriers to discharge w/patient and caregiver- Arrange for needed discharge resources and transportation as appropriate- Identify discharge  learning needs (meds, wound care, etc.)- Arrange for interpretive services to assist at discharge as needed- Refer to Case Management Department for coordinating discharge planning if the patient needs post-hospital services based on physician/advanced practitioner order or complex needs related to functional status, cognitive ability, or social support system  Outcome: Progressing     Problem: Prexisting or High Potential for Compromised Skin Integrity  Goal: Skin integrity is maintained or improved  Description: INTERVENTIONS:- Identify patients at risk for skin breakdown- Assess and monitor skin integrity- Assess and monitor nutrition and hydration status- Monitor labs - Assess for incontinence - Turn and reposition patient- Assist with mobility/ambulation- Relieve pressure over bony prominences- Avoid friction and shearing- Provide appropriate hygiene as needed including keeping skin clean and dry- Evaluate need for skin moisturizer/barrier cream- Collaborate with interdisciplinary team - Patient/family teaching- Consider wound care consult   Outcome: Progressing     Problem: METABOLIC, FLUID AND ELECTROLYTES - ADULT  Goal: Electrolytes maintained within normal limits  Description: INTERVENTIONS:- Monitor labs and assess patient for signs and symptoms of electrolyte imbalances- Administer electrolyte replacement as ordered- Monitor response to electrolyte replacements, including repeat lab results as appropriate- Instruct patient on fluid and nutrition as appropriate  Outcome: Progressing  Goal: Fluid balance maintained  Description: INTERVENTIONS:- Monitor labs - Monitor I/O and WT- Instruct patient on fluid and nutrition as appropriate- Assess for signs & symptoms of volume excess or deficit  Outcome: Progressing

## 2025-04-15 NOTE — PROGRESS NOTES
"OT daily treatment note       04/15/25 0700   Pain Assessment   Pain Assessment Tool 0-10   Pain Score 8   Pain Location/Orientation Location: Back   Hospital Pain Intervention(s) Shower/Bath   Restrictions/Precautions   Precautions Bed/chair alarms;Fall Risk;Fluid restriction;Pain;Spinal precautions;Supervision on toilet/commode;Visual deficit  (HD; significant visual impairment)   Braces or Orthoses LSO   Lifestyle   Autonomy \"It's too early to have me doing all of this\"   Eating   Type of Assistance Needed Independent   Physical Assistance Level No physical assistance   Eating CARE Score 6   Oral Hygiene   Comment refused this session   Grooming   Findings pt requesting to shave face - OT assisted as pt reports never using an electric razor PTA   Shower/Bathe Self   Type of Assistance Needed Physical assistance   Physical Assistance Level 25% or less   Comment SB seated in recliner w/ assistance for bathing BL feet to maintain spinal precautions; CS in stance for jose de jesus bathing   Shower/Bathe Self CARE Score 3   Upper Body Dressing   Type of Assistance Needed Physical assistance   Physical Assistance Level 25% or less   Comment s/u for donning/doffing tshirt; min A for LSO brace mgmt   Upper Body Dressing CARE Score 3   Lower Body Dressing   Type of Assistance Needed Physical assistance   Physical Assistance Level 51%-75%   Comment TA to thread; CS in stance to don over hips; declined wanting education on LHAE today   Lower Body Dressing CARE Score 2   Putting On/Taking Off Footwear   Type of Assistance Needed Physical assistance   Physical Assistance Level Total assistance   Comment ava sock mgmt   Putting On/Taking Off Footwear CARE Score 1   Sit to Stand   Type of Assistance Needed Incidental touching   Physical Assistance Level No physical assistance   Comment CG/CS in stance w/ RW   Sit to Stand CARE Score 4   Bed-Chair Transfer   Type of Assistance Needed Physical assistance   Physical Assistance Level 25% " "or less   Comment Min A/CG SPT w/ RW   Chair/Bed-to-Chair Transfer CARE Score 3   Cognition   Overall Cognitive Status Impaired   Arousal/Participation Alert   Attention Attends with cues to redirect   Orientation Level Oriented X4   Memory Decreased recall of precautions   Following Commands Follows multistep commands with increased time or repetition   Activity Tolerance   Activity Tolerance Patient limited by pain;Patient limited by fatigue   Medical Staff Made Aware MD made aware that pt reports the floater in his R eye is bigger today compared to yesterday.   Assessment   Treatment Assessment Pt participated in skilled OT session with focus on ADL retraining, functional transfer training, compensatory technique education, and continued education. See flowsheet for details of session and current functional status. S/P ADL, pt refused to participate further in OT stating it was too early to be \"doing all of this\". OT provided edu on importance of participating to increase IND for DC home. Pt cont to refuse stating he didn't sleep well and was experiencing too great of pain. Pt remains self-limiting due to pain and fatigue. Would benefit from team mtg w/ pt to discuss POC. Pt is limited by weakness, impaired balance, decreased endurance, increased fall risk, decreased ADLS, decreased IADLS, pain, decreased activity tolerance, decreased strength, and visual deficits and requires skilled OT services to increase independence and safety with ADL completion in prep for DC location TBD. Plan to continue ADL retraining, functional transfer training, UE strengthening/ROM, endurance training, Pt/caregiver education, equipment evaluation/education, compensatory technique education, continued education, energy conservation, and activity engagement  to address barriers mentioned above.   Prognosis Fair   Problem List Decreased endurance;Impaired balance;Decreased mobility;Pain;Decreased safety awareness;Impaired judgement "   Barriers to Discharge Inaccessible home environment;Decreased caregiver support   Plan   Treatment/Interventions ADL retraining;Functional transfer training;Therapeutic exercise;Endurance training;Patient/family training   Progress Progressing toward goals   OT Therapy Minutes   OT Time In 0700   OT Time Out 0800   OT Total Time (minutes) 60   OT Mode of treatment - Individual (minutes) 60   OT Mode of treatment - Concurrent (minutes) 0   OT Mode of treatment - Group (minutes) 0   OT Mode of treatment - Co-treat (minutes) 0   OT Mode of Treatment - Total time(minutes) 60 minutes   OT Cumulative Minutes 234   Therapy Time missed   Time missed? Yes - 30 mins 2/2 pt refusal

## 2025-04-15 NOTE — ASSESSMENT & PLAN NOTE
Patient reports he has an overall a poor sleep hygiene: he sleeps at 1-2am nightly and watches TV up until bed.  Advised patient to attempt to regulate sleep/wake cycle while here to better participate in therapy. Attempt to try to sleep earlier around midnight  >4/15: reviewed sleep log: Pt went to sleep around midnight and slept for 6 hours.    - Continue sleep log  - Continue melatonin 6mg nightly

## 2025-04-15 NOTE — ASSESSMENT & PLAN NOTE
Admitted to the trauma service after a fall with L2 vertebral body and retroperitoneal hematoma.  S/p IR embolization of distal right deep circumflex iliac artery/distal right superior gluteal artery both of which supplied an area of active extravasation and then pseudoaneurysms present at distal branches of the left inferior gluteal artery. The artery was embolized using coils and gelfoam.   Hemoglobin stable

## 2025-04-15 NOTE — PLAN OF CARE
Pt. On HD treatment for 4 hours with a Uf goal of 3-4 L as tolerated. Pt on a 2 k 2.5 basim bath for a potassium of 4.7 on 04/15/25.  Problem: METABOLIC, FLUID AND ELECTROLYTES - ADULT  Goal: Electrolytes maintained within normal limits  Description: INTERVENTIONS:- Monitor labs and assess patient for signs and symptoms of electrolyte imbalances- Administer electrolyte replacement as ordered- Monitor response to electrolyte replacements, including repeat lab results as appropriate- Instruct patient on fluid and nutrition as appropriate  Outcome: Progressing  Goal: Fluid balance maintained  Description: INTERVENTIONS:- Monitor labs - Monitor I/O and WT- Instruct patient on fluid and nutrition as appropriate- Assess for signs & symptoms of volume excess or deficit  Outcome: Progressing

## 2025-04-15 NOTE — ASSESSMENT & PLAN NOTE
Hemoglobin today 7.2  Continue SURINDER with dialysis (increased to 8000 units)  He was previously not on SURINDER  Transfuse to keep more than 7

## 2025-04-15 NOTE — ASSESSMENT & PLAN NOTE
- Sustained from fall in early February, per CT of the LUE on 2/9/25, there was a minimally displaced fracture of the anterior facet of the greater tuberosity of the left humerus, minimally apparent on repeat x-ray of the shoulder on 3/24/25  - He was evaluated by orthopedic surgery at the time of the initial injury and was recommended to maintain NWB through the LUE with a sling  - There is no further orthopedic evaluation documentation and it does not appear that further follow-up occurred.  - Clinically, he is not describing ongoing significant left arm or shoulder pain and has been weight-bearing through the LUE  > 4/14: Repeat humerus X-ray done yesterday shows no change in fracture alignment. Ortho consulted.  >4/15: Ortho c/s: May WBAT to LUE. PT/OT to increase strength and ROM. F/u with Dr. Cisneros in 4 weeks    - Monitor  - PT/OT   Carla Vazquez is a 64 year old female, presenting for annual exam.   She has chronic daily headaches. Taking excedrin TID which does help.   Also has been nauseated and vomiting frequently. Even the smell makes her nauseated. She has not lost weight. She denies black stools. She does have GERD.   Some fatigue issues. She has hypothyroidism.  Was seen by neurology for trigeminal neuralgia. Given carbamazepine but that did not help. She did f/u.   Declines HM.        Problem List:  Patient Active Problem List   Diagnosis    Malignant melanoma, unspecified site  (CMD)    Encounter for antineoplastic immunotherapy    Hypothyroid    GERD (gastroesophageal reflux disease)    Trigeminal neuralgia    Episodic migraine       Current Outpatient Medications   Medication Sig Dispense Refill    levothyroxine 112 MCG tablet Take 1 tablet by mouth daily. 100 tablet 3    pantoprazole (PROTONIX) 40 MG tablet Take 1 tablet by mouth daily. To replace omeprazole. 30 tablet 0    ondansetron (ZOFRAN) 4 MG tablet Take 1 tablet by mouth every 8 hours as needed for Nausea. 30 tablet 0    carbamazepine (CARBATROL) 100 MG 12 hr capsule Take 1 capsule by mouth in the morning and 1 capsule in the evening. May increase to 2 tablets 2 times a day after 14 days, if needed. (Patient not taking: Reported on 11/22/2024) 60 capsule 3    MELATONIN PO Take by mouth at bedtime.      aspirin-acetaminophen-caffeine (EXCEDRIN MIGRAINE) 250-250-65 MG per tablet Take 1 tablet by mouth every 6 hours as needed for Pain (migraine headaches).      omeprazole (PrilOSEC) 20 MG capsule Take 20 mg by mouth daily.       No current facility-administered medications for this visit.       Past Medical History:   Diagnosis Date    Complete rotator cuff tear     Right    GERD (gastroesophageal reflux disease)     Hypothyroid     Urinary incontinence     Wears glasses        Social History     Socioeconomic History    Marital status: /Civil Union     Spouse name:  Not on file    Number of children: Not on file    Years of education: Not on file    Highest education level: Not on file   Occupational History    Occupation: Semi-     Employer: [employer]   Tobacco Use    Smoking status: Former     Current packs/day: 0.00     Average packs/day: 0.5 packs/day for 10.0 years (5.0 ttl pk-yrs)     Types: Cigarettes     Start date: 2010     Quit date: 2020     Years since quittin.8    Smokeless tobacco: Never   Vaping Use    Vaping status: Former   Substance and Sexual Activity    Alcohol use: Not Currently     Comment: a few per year.    Drug use: No    Sexual activity: Not on file   Other Topics Concern    Not on file   Social History Narrative    Not on file     Social Determinants of Health     Financial Resource Strain: Not on file   Food Insecurity: Not on file   Transportation Needs: Not on file   Physical Activity: Not on file   Stress: Not on file   Social Connections: Not on file   Interpersonal Safety: Low Risk  (2024)    Interpersonal Safety     How often physically hurt: Never     How often insulted or talked down to: Never     How often threatened with harm: Never     How often scream or curse at: Never       Past Surgical History:   Procedure Laterality Date    Cholecystectomy  2000    Colonoscopy  10/06/2023    Dr. Todd    Incontinence surgery      TVT    Skin cancer excision Left 2023    WLE left butock melanoma 20 cm x 10 cm x 10 cm  ; Left groin SLNB    Tubal ligation  1982       Family History   Problem Relation Age of Onset    Hypertension Mother     Dementia/Alzheimers Mother     Cataracts Mother     Cancer, Colon Father 75    Cataracts Father     Cataracts Sister     Hypertension Sister     Cataracts Brother     Cataracts Brother        REVIEW OF SYSTEMS: The patient denies symptoms of:     General: fever, chills, and night sweats  Skin: rash, itching, lumps or bumps or moles that are changing, hair changes, and nail changes  Eyes:  visual blurring, double vision, spots before the eyes, and eye pain  Ears: decreased auditory acuity, ringing or tinnitus in the ears, pain in the ears, and discharge from the ears  Nose: nose bleed, discharge from the nose, and decrease in ability to smell  Mouth: soreness of the mouth or tongue or lips and abnormality of the teeth or gums  Throat: sore throat and hoarseness or voice change  Neck: stiffness or pain in the neck  Cardiorespiratory: chest pain, edema, cough, hemoptysis, wheeze, and shortness of breath  Gastrointestinal: abdominal pain, constipation, diarrhea, black tarry stools, blood in the stools, change in the bowel habits, sour burps or heart burn, and indigestion  Genitourinary: urgency, frequency, dysuria, hematuria, and nocturia  Neurologic: headache, weakness, loss of sensation, visual disturbance, trouble with balance or coordination, and loss of memory  Musculoskeletal: joint stiffness, joint pain, joint swelling, muscle pain, and blanching of the fingers with cold exposure  Psychiatric: symptoms of depression, feeling sad or blue    All other review of systems were negative.     PHYSICAL EXAM:   Visit Vitals  BP (!) 130/90   Pulse 75   Temp 96 °F (35.6 °C) (Temporal)   Resp 16   Ht 5' 3\" (1.6 m)   Wt 95.7 kg (211 lb)   BMI 37.38 kg/m²     GENERAL: Well developed, well nourished. No distress.   HEENT: Normocephalic, Atraumatic.   Tympanic membranes are clear bilaterally.  Normal nasal and oral mucosa.  NECK: supple, no lymphadenopathy  LUNGS:  Chest symmetric with normal A/P diameter.  Lungs are clear to auscultation.  There is good aeration.  There are no wheezes, rales or rhonchi noted.  HEART:  S1,S2, regular rate and rhythm no murmur,S3, or S4 auscultated  ABDOMEN:   Soft, bowel sounds are normoactive.  EXTREMITIES: no erythema, no edema to BLE  NEUROLOGICAL: CN 2-12 grossly intact  SKIN: warm, moist, without erythema, rash, or lesions  MENTAL STATUS: A&O X 3, normal thought, mood and  affect    ASSESSMENT/PLAN:   [facility]  [facility] diagnosis[facility]  Plan:  - New to Medicare Visit - Select if         billing add'l E/M    Acquired hypothyroidism  Plan: levothyroxine 112 MCG tablet, Thyroid         Stimulating Hormone Reflex    Nausea and vomiting in adult  Plan: ondansetron (ZOFRAN) 4 MG tablet    Gastroesophageal reflux disease, unspecified whether esophagitis present    Drug-induced headache, not elsewhere classified, not intractable    Orders Placed This Encounter     - New to Medicare Visit - Select if billing add'l E/M    Thyroid Stimulating Hormone Reflex    levothyroxine 112 MCG tablet    DISCONTD: pantoprazole (PROTONIX) 40 MG tablet    DISCONTD: ondansetron (ZOFRAN) 4 MG tablet    pantoprazole (PROTONIX) 40 MG tablet    ondansetron (ZOFRAN) 4 MG tablet     I have asked her to stop excedrin over the weekend to see if headaches improve. Also concerned her excessive use is causing GI issues.   Will change omeprazole to pantoprazole.   Zofran prn   Check thyroid levels today.     Review Flowsheet  More data exists         11/22/2024   PHQ 2/9 Score   Adult PHQ 2 Score 0   Adult PHQ 2 Interpretation No further screening needed   Little interest or pleasure in activity? Not at all   Feeling down, depressed or hopeless? Not at all      Details                   DEPRESSION ASSESSMENT/PLAN:  Depression screening is negative no further plan needed.      Return in about 1 year (around 11/22/2025) for MWV.    Patient agreed with this plan and had no further questions.         Sharda De La Rosa PA-C  Family Practice Department  [facility]    Supervising physician: Jeromy Plascencia MD and Riana Mast DO

## 2025-04-15 NOTE — ASSESSMENT & PLAN NOTE
Had 5 U PRBCs during hospital stay  Hemoglobin has been running mostly 7.3-7.4.    Yesterday 4/14/25 was 7.6; today 4/15/24 is 7.2.  Renal is to increase his Epogen today in light of his hemoglobin

## 2025-04-15 NOTE — PLAN OF CARE
Problem: PAIN - ADULT  Goal: Verbalizes/displays adequate comfort level or baseline comfort level  Description: Interventions:- Encourage patient to monitor pain and request assistance- Assess pain using appropriate pain scale- Administer analgesics based on type and severity of pain and evaluate response- Implement non-pharmacological measures as appropriate and evaluate response- Consider cultural and social influences on pain and pain management- Notify physician/advanced practitioner if interventions unsuccessful or patient reports new pain  Outcome: Progressing     Problem: INFECTION - ADULT  Goal: Absence or prevention of progression during hospitalization  Description: INTERVENTIONS:- Assess and monitor for signs and symptoms of infection- Monitor lab/diagnostic results- Monitor all insertion sites, i.e. indwelling lines, tubes, and drains- Monitor endotracheal if appropriate and nasal secretions for changes in amount and color- [place] appropriate cooling/warming therapies per order- Administer medications as ordered- Instruct and encourage patient and family to use good hand hygiene technique- Identify and instruct in appropriate isolation precautions for identified infection/condition  Outcome: Progressing  Goal: Absence of fever/infection during neutropenic period  Description: INTERVENTIONS:- Monitor WBC  Outcome: Progressing     Problem: SAFETY ADULT  Goal: Patient will remain free of falls  Description: INTERVENTIONS:- Educate patient/family on patient safety including physical limitations- Instruct patient to call for assistance with activity - Consult OT/PT to assist with strengthening/mobility - Keep Call bell within reach- Keep bed low and locked with side rails adjusted as appropriate- Keep care items and personal belongings within reach- Initiate and maintain comfort rounds- Make Fall Risk Sign visible to staff- Offer Toileting every  Hours, in advance of need- Initiate/Maintain alarm- Obtain  necessary fall risk management equipment: - Apply yellow socks and bracelet for high fall risk patients- Consider moving patient to room near nurses station  Outcome: Progressing  Goal: Maintain or return to baseline ADL function  Description: INTERVENTIONS:-  Assess patient's ability to carry out ADLs; assess patient's baseline for ADL function and identify physical deficits which impact ability to perform ADLs (bathing, care of mouth/teeth, toileting, grooming, dressing, etc.)- Assess/evaluate cause of self-care deficits - Assess range of motion- Assess patient's mobility; develop plan if impaired- Assess patient's need for assistive devices and provide as appropriate- Encourage maximum independence but intervene and supervise when necessary- Involve family in performance of ADLs- Assess for home care needs following discharge - Consider OT consult to assist with ADL evaluation and planning for discharge- Provide patient education as appropriate  Outcome: Progressing  Goal: Maintains/Returns to pre admission functional level  Description: INTERVENTIONS:- Perform AM-PAC 6 Click Basic Mobility/ Daily Activity assessment daily.- Set and communicate daily mobility goal to care team and patient/family/caregiver. - Collaborate with rehabilitation services on mobility goals if consulted- Perform Range of Motion  times a day.- Reposition patient every  hours.- Dangle patient  times a day- Stand patient  times a day- Ambulate patient  times a day- Out of bed to chair  times a day - Out of bed for meals  times a day- Out of bed for toileting- Record patient progress and toleration of activity level   Outcome: Progressing     Problem: DISCHARGE PLANNING  Goal: Discharge to home or other facility with appropriate resources  Description: INTERVENTIONS:- Identify barriers to discharge w/patient and caregiver- Arrange for needed discharge resources and transportation as appropriate- Identify discharge learning needs (meds, wound  care, etc.)- Arrange for interpretive services to assist at discharge as needed- Refer to Case Management Department for coordinating discharge planning if the patient needs post-hospital services based on physician/advanced practitioner order or complex needs related to functional status, cognitive ability, or social support system  Outcome: Progressing     Problem: Prexisting or High Potential for Compromised Skin Integrity  Goal: Skin integrity is maintained or improved  Description: INTERVENTIONS:- Identify patients at risk for skin breakdown- Assess and monitor skin integrity- Assess and monitor nutrition and hydration status- Monitor labs - Assess for incontinence - Turn and reposition patient- Assist with mobility/ambulation- Relieve pressure over bony prominences- Avoid friction and shearing- Provide appropriate hygiene as needed including keeping skin clean and dry- Evaluate need for skin moisturizer/barrier cream- Collaborate with interdisciplinary team - Patient/family teaching- Consider wound care consult   Outcome: Progressing     Problem: METABOLIC, FLUID AND ELECTROLYTES - ADULT  Goal: Electrolytes maintained within normal limits  Description: INTERVENTIONS:- Monitor labs and assess patient for signs and symptoms of electrolyte imbalances- Administer electrolyte replacement as ordered- Monitor response to electrolyte replacements, including repeat lab results as appropriate- Instruct patient on fluid and nutrition as appropriate  Outcome: Progressing  Goal: Fluid balance maintained  Description: INTERVENTIONS:- Monitor labs - Monitor I/O and WT- Instruct patient on fluid and nutrition as appropriate- Assess for signs & symptoms of volume excess or deficit  Outcome: Progressing

## 2025-04-15 NOTE — ASSESSMENT & PLAN NOTE
- Sustained from mechanical fall several days prior to initial presentation on 4/2/25, presenting with progressively worsening low back pain. Trauma imaging revealed a large retroperitoneal hematoma with active extravasation and hemoglobin drop. Now s/p transfusion, Kcentra and ultimately IR embolization of the right deep circumflex iliac artery and superior gluteal artery with Dr. Garcia on 4/3/25. Unfortunately, on 4/6/25, his hemoglobin decreased and repeat CT showed a 5 mm inferior gluteal artery branch pseudoaneurysm and he is s/p IR pelvic angiogram on 4/7/25 with Dr. Hernandez, which demonstrated pseudoaneurysms at the distal branches of the left inferior gluteal artery which was embolized.   > 4/15: Hgb 7.2 today (from 7.6 yesterday). Back pain seems controlled. Will be receiving increased Epogen today per IM/Nephro    - Continue to trend Hgb on CBC closely  - Consider repeat imaging if Hgb continues to downtrend or pain significantly worsens  - Contact IR if there are additional bleeding concerns given multiple prior interventions described above  - Local right hip/groin access site incision care  - Analgesia, see below  - PT and OT

## 2025-04-15 NOTE — ASSESSMENT & PLAN NOTE
TTS at [facility]  Access: Right IJ permacath  EDW 99 kg, last HD postweight 97 kg  HD today

## 2025-04-15 NOTE — ASSESSMENT & PLAN NOTE
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   Ortho saw on 4/14 = [facility]AT; see Dr Cisneros 4 weeks  "

## 2025-04-16 PROCEDURE — 99232 SBSQ HOSP IP/OBS MODERATE 35: CPT | Performed by: NURSE PRACTITIONER

## 2025-04-16 PROCEDURE — 97530 THERAPEUTIC ACTIVITIES: CPT

## 2025-04-16 PROCEDURE — 97110 THERAPEUTIC EXERCISES: CPT

## 2025-04-16 PROCEDURE — 97116 GAIT TRAINING THERAPY: CPT

## 2025-04-16 PROCEDURE — 99232 SBSQ HOSP IP/OBS MODERATE 35: CPT | Performed by: INTERNAL MEDICINE

## 2025-04-16 PROCEDURE — 99232 SBSQ HOSP IP/OBS MODERATE 35: CPT | Performed by: STUDENT IN AN ORGANIZED HEALTH CARE EDUCATION/TRAINING PROGRAM

## 2025-04-16 RX ADMIN — ATORVASTATIN CALCIUM 80 MG: 80 TABLET, FILM COATED ORAL at 17:54

## 2025-04-16 RX ADMIN — ACETAMINOPHEN 975 MG: 325 TABLET, FILM COATED ORAL at 21:25

## 2025-04-16 RX ADMIN — NIFEDIPINE 30 MG: 30 TABLET, FILM COATED, EXTENDED RELEASE ORAL at 17:54

## 2025-04-16 RX ADMIN — ALLOPURINOL 100 MG: 100 TABLET ORAL at 09:13

## 2025-04-16 RX ADMIN — METHOCARBAMOL 1000 MG: 500 TABLET ORAL at 04:36

## 2025-04-16 RX ADMIN — DOCUSATE SODIUM 100 MG: 100 CAPSULE, LIQUID FILLED ORAL at 17:54

## 2025-04-16 RX ADMIN — METHOCARBAMOL 1000 MG: 500 TABLET ORAL at 13:54

## 2025-04-16 RX ADMIN — SENNOSIDES 17.2 MG: 8.6 TABLET, FILM COATED ORAL at 12:05

## 2025-04-16 RX ADMIN — SEVELAMER HYDROCHLORIDE 1600 MG: 800 TABLET ORAL at 09:13

## 2025-04-16 RX ADMIN — SEVELAMER HYDROCHLORIDE 1600 MG: 800 TABLET ORAL at 12:05

## 2025-04-16 RX ADMIN — METHOCARBAMOL 1000 MG: 500 TABLET ORAL at 21:25

## 2025-04-16 RX ADMIN — GABAPENTIN 100 MG: 100 CAPSULE ORAL at 09:13

## 2025-04-16 RX ADMIN — SEVELAMER HYDROCHLORIDE 1600 MG: 800 TABLET ORAL at 17:54

## 2025-04-16 RX ADMIN — ACETAMINOPHEN 975 MG: 325 TABLET, FILM COATED ORAL at 13:54

## 2025-04-16 RX ADMIN — ACETAMINOPHEN 975 MG: 325 TABLET, FILM COATED ORAL at 04:36

## 2025-04-16 RX ADMIN — APIXABAN 5 MG: 5 TABLET, FILM COATED ORAL at 09:13

## 2025-04-16 RX ADMIN — DOCUSATE SODIUM 100 MG: 100 CAPSULE, LIQUID FILLED ORAL at 09:13

## 2025-04-16 RX ADMIN — OXYCODONE HYDROCHLORIDE 10 MG: 10 TABLET ORAL at 01:54

## 2025-04-16 RX ADMIN — Medication 6 MG: at 21:25

## 2025-04-16 RX ADMIN — APIXABAN 5 MG: 5 TABLET, FILM COATED ORAL at 17:54

## 2025-04-16 RX ADMIN — OXYCODONE HYDROCHLORIDE 10 MG: 10 TABLET ORAL at 12:08

## 2025-04-16 NOTE — PCC OCCUPATIONAL THERAPY
04/16/25  Pt is demonstrating fair progress with occupational therapy and is progressing toward long term goals for ADL, IADL, and functional transfers/mobility. Pts long term goals for ADLs are Independent with Rolling Walker. Pt is currently Partial/moderate assistance  for ADLs. Pt continues to present with impairments in activity tolerance, endurance, standing balance/tolerance, UE strength, memory, insight, safety , judgement , and visual perceptual skills . Occupational performance remains limited by fatigue, pain, spinal precautions, impulsivity, (R) visual deficits , (L) visual deficits , decreased caregiver support, and risk for falls. Family training/education will be required prior to D/C. Pt will continue to benefit from skilled acute rehab OT services to address above mentioned barriers and maximize functional independence in baseline areas of occupation to meet established treatment goals with overall decreased burden of care. Plan of care to continue to focus on ADL Retraining , LB Dressing, UB dressing,  LHAE education/training, IADL training , Kitchen Mobilty, Meal preparation, Medication management , Functional Transfers, Functional Cognition, Functional Attention, Standing tolerance, Standing balance , Gross motor strengthening , Visual perceptual skills, Visual scanning, Low vision education/training, Family training/education, Energy conservation training/education, healthy coping education, Leisure and social pursuits, and community re-integration.     Anticipate Discharge date to be set.     04/23/25: Pt is demonstrating good progress with occupational therapy and is progressing toward long term goals for ADL, IADL, and functional transfers/mobility. Pts long term goals for ADLs are Independent with Rolling Walker. Pt is currently Supervision or touching assistance-  for ADLs. Pt continues to present with impairments in activity tolerance, endurance, visual perceptual skills , and depth  perception . Occupational performance remains limited by fatigue, pain, spinal precautions, decreased caregiver support, risk for falls, and home environment. Family training/education will not be required prior to D/C. Pt will continue to benefit from skilled acute rehab OT services to address above mentioned barriers and maximize functional independence in baseline areas of occupation to meet established treatment goals with overall decreased burden of care. Plan of care to continue to focus on ADL Retraining ,  LHAE education/training, IADL training , Kitchen Mobilty, Meal preparation, Medication management , Functional Transfers, Standing tolerance, Standing balance , Low vision education/training, DME training/education, Energy conservation training/education, healthy coping education, Leisure and social pursuits, and community re-integration. Goals for the upcoming week are: completing IADLs and preparing for DC.     Anticipate DC this wk pending transport setup w/ dialysis.     04/30/25  Pt is demonstrating good progress with occupational therapy and is progressing toward long term goals for ADL, IADL, and functional transfers/mobility. Pts long term goals for ADLs are Independent with Rolling Walker. Pt is currently Set up or clean-up assistance  for ADLs. Pt continues to present with impairments in activity tolerance, endurance, standing balance/tolerance, UE strength, memory, insight, safety , judgement , and visual perceptual skills . Occupational performance remains limited by fatigue, pain, spinal precautions, (R) visual deficits , (L) visual deficits , decreased caregiver support, and risk for falls. Family training/education will occur today 4/30. Pt will continue to benefit from skilled acute rehab OT services to address above mentioned barriers and maximize functional independence in baseline areas of occupation to meet established treatment goals with overall decreased burden of care. Plan of care  to continue to focus on ADL Retraining , LB Dressing, UB dressing,  LHAE education/training, IADL training , Kitchen Mobilty, Meal preparation, Medication management , Functional Transfers, Functional Cognition, Functional Attention, Standing tolerance, Standing balance , Gross motor strengthening , Low vision education/training, DME training/education, Family training/education, Energy conservation training/education, healthy coping education, Leisure and social pursuits, community re-integration, and return to work simulation.     Anticipate DC this wk pending transport setup w/ dialysis.

## 2025-04-16 NOTE — PROGRESS NOTES
NEPHROLOGY HOSPITAL PROGRESS NOTE   Naresh Carpio 64 y.o. male MRN: [id number]  Unit/Bed#: -01 Encounter: [id number]  Reason for Consult: ESRD on HD    Assessment & Plan  ESRD (end stage renal disease) (HCC)  TTS at [facility]  Access: Right IJ permacath  EDW 99 kg, last HD postweight 97.2 kg  Stable for interdialytic timeframe  HD tomorrow  Primary hypertension  Blood pressure acceptable  Continue nifedipine 30 mg daily  Anemia due to chronic kidney disease, on chronic dialysis (HCC)  Hemoglobin 7.2 on 04/15  Continue SURINDER with dialysis (increased to 8000 units)  He was previously not on SURINDER  Transfuse to keep more than 7  Hyperphosphatemia  Phosphorus level 4.6 on 04/15  Continue sevelamer with meals  Chronic diastolic (congestive) heart failure (HCC)  Ultrafiltration as tolerated on HD  Traumatic retroperitoneal hematoma  Status post IR embolization  Management per primary team  Paroxysmal atrial fibrillation (HCC)  Management per primary team  Diabetic ulcer of left midfoot associated with type 2 diabetes mellitus, limited to breakdown of skin (HCC)  Management per primary team  Hyponatremia  This is due to lack of free water clearance and probably excessive fluid intake  Sodium level 125 on 04/15  Continue fluid restriction 1200 cc per 24 hours  138 mEq sodium bath on dialysis today  Continue ultrafiltration as tolerated on HD  Check BMP tomorrow prior to dialysis      I have reviewed the nephrology recommendations including dialysis tomorrow and that patient is stable for entire dialytic timeframe, with internal medicine team, and we are in agreement with renal plan including the information outlined above.    SUBJECTIVE / 24H INTERVAL HISTORY:  Status post hemodialysis yesterday with 3.2 L UF.  Denies dyspnea.      OBJECTIVE:  Current Weight: Weight - Scale: 96 kg (211 lb 9.6 oz)  Vitals:    04/15/25 1650 04/15/25 2003 04/16/25 0430 04/16/25 0554   BP: 134/60 139/62  111/56   BP Location:   Left arm  Left arm   Pulse: 80 76 72 59   Resp: 16 17 18    Temp: 97.6 °F (36.4 °C) 98.1 °F (36.7 °C) 98 °F (36.7 °C)    TempSrc:  Oral Oral    SpO2:  95% 100%    Weight:   96 kg (211 lb 9.6 oz)    Height:           Intake/Output Summary (Last 24 hours) at 4/16/2025 0816  Last data filed at 4/16/2025 0554  Gross per 24 hour   Intake 810 ml   Output 3900 ml   Net -3090 ml     Review of Systems   Constitutional:  Negative for chills and fever.   HENT:  Negative for ear pain and sore throat.    Eyes:  Negative for pain and visual disturbance.   Respiratory:  Negative for cough and shortness of breath.    Cardiovascular:  Positive for leg swelling. Negative for chest pain and palpitations.   Gastrointestinal:  Negative for abdominal pain and vomiting.   Genitourinary:  Negative for dysuria and hematuria.   Musculoskeletal:  Negative for arthralgias and back pain.   Skin:  Negative for color change and rash.   Neurological:  Negative for seizures and syncope.   All other systems reviewed and are negative.    Physical Exam  Vitals and nursing note reviewed.   Constitutional:       General: He is not in acute distress.     Appearance: He is well-developed.   HENT:      Head: Normocephalic and atraumatic.   Eyes:      Conjunctiva/sclera: Conjunctivae normal.   Cardiovascular:      Rate and Rhythm: Normal rate and regular rhythm.      Heart sounds: No murmur heard.     Comments: Right chest wall permacath present  Pulmonary:      Effort: Pulmonary effort is normal. No respiratory distress.      Breath sounds: Normal breath sounds.   Abdominal:      Palpations: Abdomen is soft.      Tenderness: There is no abdominal tenderness.   Musculoskeletal:         General: No swelling.      Cervical back: Neck supple.      Right lower leg: Edema present.      Left lower leg: Edema present.   Skin:     General: Skin is warm and dry.      Capillary Refill: Capillary refill takes less than 2 seconds.   Neurological:      Mental Status: He  is alert.   Psychiatric:         Mood and Affect: Mood normal.       Medications:    Current Facility-Administered Medications:     acetaminophen (TYLENOL) tablet 975 mg, 975 mg, Oral, Q8H KEMAL, Damian Cerda MD, 975 mg at 04/16/25 0436    allopurinol (ZYLOPRIM) tablet 100 mg, 100 mg, Oral, Daily, Damian Cerda MD, 100 mg at 04/15/25 0804    apixaban (ELIQUIS) tablet 5 mg, 5 mg, Oral, BID, Damian Cerda MD, 5 mg at 04/15/25 1733    atorvastatin (LIPITOR) tablet 80 mg, 80 mg, Oral, Daily With Dinner, Damian Cerda MD, 80 mg at 04/15/25 1732    bisacodyl (DULCOLAX) rectal suppository 10 mg, 10 mg, Rectal, Daily PRN, Michelle T Landa, DO    docusate sodium (COLACE) capsule 100 mg, 100 mg, Oral, BID, Michelle T Landa, DO, 100 mg at 04/15/25 1732    epoetin jessica (EPOGEN,PROCRIT) injection 8,000 Units, 8,000 Units, Intravenous, After Dialysis, German Linton MD, 8,000 Units at 04/15/25 1528    gabapentin (NEURONTIN) capsule 100 mg, 100 mg, Oral, Once per day on Monday Wednesday Friday, Damian Cerda MD, 100 mg at 04/14/25 0836    lidocaine (LIDODERM) 5 % patch 1 patch, 1 patch, Topical, Daily PRN, Damian Cerda MD, 1 patch at 04/13/25 2046    melatonin tablet 6 mg, 6 mg, Oral, HS, Jayden Rowland MD, 6 mg at 04/15/25 2151    methocarbamol (ROBAXIN) tablet 1,000 mg, 1,000 mg, Oral, Q8H KEMAL, Jayden Rowland MD, 1,000 mg at 04/16/25 0436    naloxone (NARCAN) 0.04 mg/mL syringe 0.04 mg, 0.04 mg, Intravenous, Q1MIN PRN, Damian Cerda MD    NIFEdipine (PROCARDIA XL) 24 hr tablet 30 mg, 30 mg, Oral, After Dinner, Damian Cerda MD, 30 mg at 04/15/25 1733    oxyCODONE (ROXICODONE) IR tablet 5 mg, 5 mg, Oral, Q4H PRN **OR** oxyCODONE (ROXICODONE) immediate release tablet 10 mg, 10 mg, Oral, Q4H PRN, Damian Cerda MD, 10 mg at 04/16/25 0154    oxyCODONE (ROXICODONE) split tablet 2.5 mg, 2.5 mg, Oral, Q8H PRN, Jayden Rowland MD    polyethylene glycol (MIRALAX) packet 17 g, 17 g, Oral, Daily PRN, DO will Ramsey  "(SENOKOT) tablet 17.2 mg, 2 tablet, Oral, Daily With Lunch, Michelle Landa DO    sevelamer (RENAGEL) tablet 1,600 mg, 1,600 mg, Oral, TID With Meals, Damian Cerda MD, 1,600 mg at 04/15/25 1732    white petrolatum-mineral oil (EUCERIN,HYDROCERIN) cream, , Topical, TID PRN, Michelle Landa DO    Laboratory Results:  Results from last 7 days   Lab Units 04/15/25  0447 04/15/25  0428 04/14/25  0611 04/13/25  1307 04/11/25  0508 04/10/25  0820   WBC Thousand/uL  --  8.35 9.11  --  7.55 7.81   HEMOGLOBIN g/dL  --  7.2* 7.6* 8.0* 7.4* 7.4*   HEMATOCRIT %  --  22.8* 24.7* 25.4* 25.0* 23.7*   PLATELETS Thousands/uL  --  318 336  --  378 349   POTASSIUM mmol/L 4.7  --  4.7  --  4.4 4.4   CHLORIDE mmol/L 89*  --  93*  --  96 93*   CO2 mmol/L 24  --  26  --  28 28   BUN mg/dL 55*  --  39*  --  30* 52*   CREATININE mg/dL 7.54*  --  6.16*  --  4.63* 7.50*   CALCIUM mg/dL 8.9  --  9.1  --  9.1 9.0   MAGNESIUM mg/dL  --   --   --   --   --  2.2   PHOSPHORUS mg/dL 4.6*  --  4.2*  --   --  5.3*       Portions of the record may have been created with voice recognition software. Occasional wrong word or \"sound a like\" substitutions may have occurred due to the inherent limitations of voice recognition software. Read the chart carefully and recognize, using context, where substitutions have occurred.If you have any questions, please contact the dictating provider.    "

## 2025-04-16 NOTE — ASSESSMENT & PLAN NOTE
Admitted to the trauma service after a fall with L2 vertebral body and retroperitoneal hematoma.  S/p IR embolization of distal right deep circumflex iliac artery/distal right superior gluteal artery both of which supplied an area of active extravasation and then pseudoaneurysms present at distal branches of the left inferior gluteal artery. The artery was embolized using coils and gelfoam.   Hemoglobin stable on 4/14

## 2025-04-16 NOTE — ASSESSMENT & PLAN NOTE
Hemoglobin 7.2 on 04/15  Continue SURINDER with dialysis (increased to 8000 units)  He was previously not on SURINDER  Transfuse to keep more than 7

## 2025-04-16 NOTE — ASSESSMENT & PLAN NOTE
Diagnostic wire removed. LFA History of a fall onto a rail a few days ago. Continued to have progressive pain  Presented to [place] ED, found to have retroperitoneal hemorrhage with areas of active contrast extravasation. Transferred to [facility]  S/p IR embolization of branch of the deep circumflex iliac artery and superior gluteal artery overnight  Management per remarry service

## 2025-04-16 NOTE — PROGRESS NOTES
"Progress Note - Hospitalist   Name: Naresh Carpio 64 y.o. male I MRN: [id number]  Unit/Bed#: -01 I Date of Admission: 4/11/2025   Date of Service: 4/16/2025 I Hospital Day: 5    Assessment & Plan  Traumatic retroperitoneal hematoma  Admitted to the trauma service after a fall with L2 vertebral body and retroperitoneal hematoma.  S/p IR embolization of distal right deep circumflex iliac artery/distal right superior gluteal artery both of which supplied an area of active extravasation and then pseudoaneurysms present at distal branches of the left inferior gluteal artery. The artery was embolized using coils and gelfoam.   Hemoglobin stable on 4/14  Anemia due to chronic kidney disease, on chronic dialysis ([facility])  Had 5 U PRBCs during hospital stay  Hemoglobin has been running mostly 7.3-7.4.    On 4/14/25 was 7.6; on 4/15/24 was 7.2 ---> Renal increased his Epogen today in light of his hemoglobin  ESRD (end stage renal disease) ([facility])  Continue Sevelamer TID with meals  Renal following  HD T-TH-Sat while here in rehab  Paroxysmal atrial fibrillation ([facility])  On no rate control medications  Examines RRR  Anticoagulation with Eliquis as at home  Chronic diastolic (congestive) heart failure ([facility])  Appears euvolemic except for LE pitting edema which appears to be chronic  Renal diet  stable  Volume management via HD  Primary hypertension  Continue Procardia XL 30 mg qd  Tx per renal  Visual disturbance  Reported a right eye \"floater\" without associated pain earlier in his hospitalization which has resolved but continued to have blurry central vision.  Ophthalmology saw over the weekend = visual acuity without correction is 20/400 OU. Dx: Proliferative diabetic retinopathy with vitreous hemorrhage OU as well as cataracts.   Will need outpatient follow up immediately after discharge from rehab  Diabetic ulcer of left midfoot associated with type 2 diabetes mellitus, limited to breakdown of skin ([facility])  Continue local " "care  Hyponatremia  Renal following  Tx/adjust with HD  Closed fracture of proximal end of left humerus with routine healing  Occurred from a fall in February and eval'd at the time of the injury  Was to continue NWB/sling and followup with Ortho but there was no followup done  Xray done 4/13/25 = \"Unchanged alignment of the chronic greater tuberosity avulsion fracture\".   Ortho saw here on 4/14 = WBAT; see Dr Cisneros 4 weeks  Gout  Continue allopurinol  L2 vertebral fracture ([facility]CC)  CT CAP 4/2: Fracture of L2 vertebral body extending to the superior endplate  Continue LSO brace  Insomnia  Patient reports not sleeping well at night because he is awake worrying about his medical conditions  Discussed with PMR over the weekend and started melatonin    The above assessment and plan was reviewed and updated as determined by my evaluation of the patient on 4/16/2025.    History of Present Illness   Patient seen and examined. Patients overnight issues or events were reviewed with nursing staff. New or overnight issues include the following:   No new or overnight issues.  Offers no complaints    Review of Systems   All other systems reviewed and are negative.      Objective :  Temp:  [97.5 °F (36.4 °C)-98.1 °F (36.7 °C)] 98 °F (36.7 °C)  HR:  [59-88] 59  BP: (111-146)/(53-81) 111/56  Resp:  [16-18] 18  SpO2:  [95 %-100 %] 100 %  O2 Device: None (Room air)    Invasive Devices       Peripheral Intravenous Line  Duration             Peripheral IV 04/13/25 Left;Ventral (anterior) Forearm 2 days              Hemodialysis Catheter  Duration             HD Permanent Double Catheter 595 days                    Physical Exam  General Appearance: no distress, non toxic appearing  HEENT: PERRLA, conjuctiva normal; oropharynx clear; mucous membranes moist   Neck:  Supple, normal ROM  Lungs: CTA, normal respiratory effort, no retractions, expiratory effort normal  CV: regular rate and rhythm; no rubs/murmurs/gallops, PMI normal   ABD: " soft; ND/NT; +BS  EXT: +LE edema  Skin: normal turgor, normal texture  Psych: affect normal, mood normal  Neuro: AAO        The above physical exam was reviewed and updated as determined by my evaluation of the patient on 4/16/2025.      Lab Results: I have reviewed the following results:  Results from last 7 days   Lab Units 04/15/25  0428 04/14/25  0611   WBC Thousand/uL 8.35 9.11   HEMOGLOBIN g/dL 7.2* 7.6*   HEMATOCRIT % 22.8* 24.7*   PLATELETS Thousands/uL 318 336     Results from last 7 days   Lab Units 04/15/25  0447 04/14/25  0611   SODIUM mmol/L 125* 129*   POTASSIUM mmol/L 4.7 4.7   CHLORIDE mmol/L 89* 93*   CO2 mmol/L 24 26   BUN mg/dL 55* 39*   CREATININE mg/dL 7.54* 6.16*   CALCIUM mg/dL 8.9 9.1             Results from last 7 days   Lab Units 04/11/25  0714 04/10/25  1810 04/10/25  0708   POC GLUCOSE mg/dl 93 115 102       Imaging Results Review: No pertinent imaging studies reviewed.  Other Study Results Review: No additional pertinent studies reviewed.    Review of Scheduled Meds: Medications  reviewed and reconciled as needed  Current Facility-Administered Medications   Medication Dose Route Frequency Provider Last Rate    acetaminophen  975 mg Oral Q8H KEMAL Cerda MD      allopurinol  100 mg Oral Daily Damian Cerda MD      apixaban  5 mg Oral BID Damian Cerda MD      atorvastatin  80 mg Oral Daily With Dinner Damian Cerda MD      bisacodyl  10 mg Rectal Daily PRN Michelle T Landa, DO      docusate sodium  100 mg Oral BID Michelle T Landa, DO      epoetin jessica  8,000 Units Intravenous After Dialysis German Linton MD      gabapentin  100 mg Oral Once per day on Monday Wednesday Friday Damian Cerda MD      lidocaine  1 patch Topical Daily PRN Damian Cerda MD      melatonin  6 mg Oral HS Jayden Rowland MD      methocarbamol  1,000 mg Oral Q8H [facility] Jayden Rowland MD      naloxone  0.04 mg Intravenous Q1MIN PRN Damian Cerda MD      NIFEdipine  30 mg Oral After Dinner Damian Cerda MD       oxyCODONE  5 mg Oral Q4H PRN Damian Cerda MD      Or    oxyCODONE  10 mg Oral Q4H PRN Damian Cerda MD      oxyCODONE  2.5 mg Oral Q8H PRN Jayden Rowland MD      polyethylene glycol  17 g Oral Daily PRN Michelle T Landa, DO      senna  2 tablet Oral Daily With Lunch Michelle T Landa, DO      sevelamer  1,600 mg Oral TID With Meals Damian Cerda MD      white petrolatum-mineral oil   Topical TID PRN Michelle T Landa, DO         VTE Pharmacologic Prophylaxis: Eliquis  Code Status: Level 1 - Full Code  Current Length of Stay: 5 day(s)    Administrative Statements     ** Please Note:  voice to text software may have been used in the creation of this document. Although proof errors in transcription or interpretation are a potential of such software**

## 2025-04-16 NOTE — ASSESSMENT & PLAN NOTE
TTS at [facility]  Access: Right IJ permacath  EDW 99 kg, last HD postweight 97.2 kg  Stable for interdialytic timeframe  HD tomorrow

## 2025-04-16 NOTE — ASSESSMENT & PLAN NOTE
Wt Readings from Last 3 Encounters:   04/16/25 96 kg (211 lb 9.6 oz)   04/11/25 98.5 kg (217 lb 2.5 oz)   03/24/25 107 kg (234 lb 12.6 oz)     - Most recent TTE from 10/9/24 with EF 60-65%, grade 1 diastolic dysfunction  - Volume management with HD per nephrology  - Monitor weights  - Appreciate medicine and nephrology recommendations and management

## 2025-04-16 NOTE — ASSESSMENT & PLAN NOTE
"- Reporting \"floater\" within the right eye with acute onset on 4/11/25 at mid-day while watching TV; describes a dark, \"spider\"-shaped floater that occupies a significant portion of the visual field, painless; has similar floaters in the left eye but smaller  - Ophthalmology consulted on [facility] admission by IM, per prior trauma surgery documentation, Dr. Chaudhry of ophthalmology was made personally aware of clinical situation on 4/11/25 prior to [facility] admission  > Ophthalmology consult 4/12: Exam c/w proliferative diabetic retinopathy with vitreous hemorrhage. Pt will follow up with ophtho immediately after discharge and for referral to a retina specialist  > 4/15- Pt thinks his R eye floater may be larger today, unable to clearly state or quantify symptoms. No other visual complaints of increased number of floaters, double/blurry vision, or bright flashes. Advised patient to inform us if symptoms change - will reach out to Ophthalmology accordingly    - Monitor for changes in vision  "

## 2025-04-16 NOTE — PROGRESS NOTES
"OT daily tx note     04/16/25 1300   Pain Assessment   Pain Assessment Tool 0-10   Pain Score 7   Pain Location/Orientation Orientation: Lower;Location: Abdomen;Location: Back   Hospital Pain Intervention(s) Rest;Emotional support   Restrictions/Precautions   Precautions Bed/chair alarms;Cognitive;Fall Risk;Impulsive;Pain;Supervision on toilet/commode;Visual deficit;Spinal precautions   RUE Weight Bearing Per Order WBAT   LUE Weight Bearing Per Order WBAT   RLE Weight Bearing Per Order WBAT   LLE Weight Bearing Per Order WBAT   ROM Restrictions Yes  (spinal prec)   Braces or Orthoses LSO  (OOB)   Lifestyle   Autonomy \"I just want my eyes to be better so I can leave\"   Sit to Stand   Type of Assistance Needed Supervision   Physical Assistance Level No physical assistance   Comment CS w/ RW   Sit to Stand CARE Score 4   Bed-Chair Transfer   Type of Assistance Needed Supervision   Physical Assistance Level No physical assistance   Comment CS w/ RW   Chair/Bed-to-Chair Transfer CARE Score 4   Kitchen Mobility   Kitchen-Mobility Level Walker   Kitchen Activity Retrieve items;Transport items   Kitchen Mobility Comments Pt introduced to walker tray options and edu w/ preference for universal walker tray. Pt declined to cook but open to walking w/ tray to \"see if it's useful.\" Pt able to retreive items around kitchen from fridge, [place] cabinets and nearby surfaces. Pt able to retrieve/transports items from point A to B using walker tray. Pt finds device very useful especially since he will be using RW at all times in therapy/following DC for safety. When DC plan is clarified, OT can simulate meal prep/kitchen mobility that will accurately reflect DC location (I.e house vs hotel). Pt demo G walking balance and standing tolerance for >3 min w/ seated rest breaks as needed. Pt agreeable within his limits and will cont to address in upcoming sessions.   Commmunity Re-entry   Community Re-entry Level Walker   Community Re-entry " Level of Assistance Close supervision   Community Re-entry Pt very fatigued upon OT arrival and initially hesitant to participate but eventually agreed. Pt very motivated to walk/move in any way and participated in community distance walking around unit to simulate home distance and community. Pt utilizing rest breaks as needed and progressing w/ mobility status. Pt completed side stepping using rail in hallway as he did in PT session earlier. Pt completed side-stepping task and functional mobility using RW w/ CS. Pt benefits from activities to improve activity tolerance, engagement in therapy, endurance, and safety using RW to facilitate ind w/ ADLs.   Cognition   Overall Cognitive Status Impaired   Arousal/Participation Alert   Attention Attends with cues to redirect   Orientation Level Oriented X4   Memory Decreased recall of precautions   Following Commands Follows multistep commands with increased time or repetition   Activity Tolerance   Activity Tolerance Patient limited by fatigue;Patient limited by pain   Assessment   Treatment Assessment Pt engaged in skilled OT session with focus on IADL training , Kitchen Mobilty, Meal preparation, Functional Transfers, Standing tolerance, Standing balance , DME training/education, Energy conservation training/education, healthy coping education, Leisure and social pursuits, and community re-integration. Pt is limited by weakness, impaired balance, decreased endurance, increased fall risk, decreased ADLS, decreased IADLS, decreased activity tolerance, decreased safety awareness, impaired judgement, and visual deficits. Pt very fatigued during session but in better mindset. However when discussing DC plan for home, pt demo dec frustration tolerance/agitation. Pt reports that he is unsure where to call home and doesn't think sister will want him to live w/ her. Will need to f/u w/ team and sister about what DC plan will be as pt is making G progress w/ therapy. However  will need to approach pt w/ plan appropriately as he gets very stressed whenever mentioned due to imp vision. OT services are warranted to address above barriers.   Prognosis Fair   Problem List Decreased endurance;Impaired balance;Decreased mobility;Pain;Decreased safety awareness;Impaired judgement   Barriers to Discharge Inaccessible home environment;Decreased caregiver support   Plan   Treatment/Interventions Functional transfer training;ADL retraining;Therapeutic exercise;Endurance training;Patient/family training   Progress Progressing toward goals   OT Therapy Minutes   OT Time In 1300   OT Time Out 1400   OT Total Time (minutes) 60   OT Mode of treatment - Individual (minutes) 60   OT Mode of treatment - Concurrent (minutes) 0   OT Mode of treatment - Group (minutes) 0   OT Mode of treatment - Co-treat (minutes) 0   OT Mode of Treatment - Total time(minutes) 60 minutes   OT Cumulative Minutes 294   Therapy Time missed   Time missed? No

## 2025-04-16 NOTE — NURSING NOTE
Pt sleeping in chair.  Ref to elevate feet or to get in bed.  Too uncomfortable for him.  Pt does have LSO brace on.  Activates chair alarm a few times by sitting towards edge of chair

## 2025-04-16 NOTE — ASSESSMENT & PLAN NOTE
Had 5 U PRBCs during hospital stay  Hemoglobin has been running mostly 7.3-7.4.    On 4/14/25 was 7.6; on 4/15/24 was 7.2 ---> Renal increased his Epogen today in light of his hemoglobin

## 2025-04-16 NOTE — PROGRESS NOTES
04/16/25 0853   Pain Assessment   Pain Assessment Tool 0-10   Pain Score 5   Pain Location/Orientation Orientation: Lower;Location: Back;Orientation: Right;Location: Abdomen   Restrictions/Precautions   Precautions Bed/chair alarms;Fluid restriction;Supervision on toilet/commode;Visual deficit;Fall Risk   Braces or Orthoses LSO   General   Change In Medical/Functional Status B Lower Legs Ace wrapped for edema management   Subjective   Subjective Frustarted about his vision. Worried about his future with driving and taking care of himself.   Roll Left and Right   Comment has been sleeping in recliner chair for comfort   Reason if not Attempted Safety concerns   Roll Left and Right CARE Score 88   Sit to Lying   Type of Assistance Needed Set-up / clean-up   Comment Recliner   Sit to Lying CARE Score 5   Lying to Sitting on Side of Bed   Type of Assistance Needed Set-up / clean-up   Comment recliner   Lying to Sitting on Side of Bed CARE Score 5   Sit to Stand   Type of Assistance Needed Supervision   Comment with RW   Sit to Stand CARE Score 4   Bed-Chair Transfer   Type of Assistance Needed Supervision;Adaptive equipment   Comment CS with RW   Chair/Bed-to-Chair Transfer CARE Score 4   Car Transfer   Type of Assistance Needed Incidental touching;Adaptive equipment   Car Transfer CARE Score 4   Walk 10 Feet   Type of Assistance Needed Supervision;Adaptive equipment   Comment RW   Walk 10 Feet CARE Score 4   Walk 50 Feet with Two Turns   Type of Assistance Needed Supervision;Adaptive equipment   Comment CS with RW   Walk 50 Feet with Two Turns CARE Score 4   Walk 150 Feet   Type of Assistance Needed Supervision;Adaptive equipment   Comment CS with RW   Walk 150 Feet CARE Score 4   Walking 10 Feet on Uneven Surfaces   Type of Assistance Needed Supervision;Adaptive equipment   Comment CS with RW across mat on floor   Walking 10 Feet on Uneven Surfaces CARE Score 4   Ambulation   Primary Mode of Locomotion Prior to  "Admission Walk   Distance Walked (feet) 175 ft  (175 x 1, 150 x 3)   Assist Device Roller Walker   Does the patient walk? 2. Yes   Wheelchair mobility   Does the patient use a wheelchair? 0. No   Curb or Single Stair   Style negotiated Curb   Type of Assistance Needed Incidental touching;Adaptive equipment   Physical Assistance Level No physical assistance   Comment CGA with RW on 6\" curb step   1 Step (Curb) CARE Score 4   12 Steps   Reason if not Attempted Activity not applicable   12 Steps CARE Score 9   Therapeutic Interventions   Other B lower legs ace wrapped at start of session for edema management, NEw order started today   Assessment   Treatment Assessment Pt continues to improve with activity tolerance and overall mobility using RW. Improved amira, improved floor clearnce noted today. No LOB with curb step using RW today. Continues to require frequent seated rest breaks due to LBP and legs fatiguing. Pt mainly concerned about his new change in vision affecting his need for supervision with mobility, fear he will not be able to drive and take care of himself.   Plan   Progress Progressing toward goals   PT Therapy Minutes   PT Time In 0830   PT Time Out 0930   PT Total Time (minutes) 60   PT Mode of treatment - Individual (minutes) 60   PT Mode of treatment - Concurrent (minutes) 0   PT Mode of treatment - Group (minutes) 0   PT Mode of treatment - Co-treat (minutes) 0   PT Mode of Treatment - Total time(minutes) 60 minutes   PT Cumulative Minutes 270   Therapy Time missed   Time missed? No     "

## 2025-04-16 NOTE — PROGRESS NOTES
Progress Note - PMR   Name: Naresh Carpio 64 y.o. male I MRN: [id number]  Unit/Bed#: -01 I Date of Admission: 4/11/2025   Date of Service: 4/16/2025 I Hospital Day: 5     Assessment & Plan  Traumatic retroperitoneal hematoma  - Sustained from mechanical fall several days prior to initial presentation on 4/2/25, presenting with progressively worsening low back pain. Trauma imaging revealed a large retroperitoneal hematoma with active extravasation and hemoglobin drop. Now s/p transfusion, Kcentra and ultimately IR embolization of the right deep circumflex iliac artery and superior gluteal artery with Dr. Garcia on 4/3/25. Unfortunately, on 4/6/25, his hemoglobin decreased and repeat CT showed a 5 mm inferior gluteal artery branch pseudoaneurysm and he is s/p IR pelvic angiogram on 4/7/25 with Dr. Hernandez, which demonstrated pseudoaneurysms at the distal branches of the left inferior gluteal artery which was embolized.   > 4/15: Hgb 7.2 today (from 7.6 yesterday). Back pain seems controlled. Will be receiving increased Epogen today per IM/Nephro    - Continue to trend Hgb on CBC closely  - Consider repeat imaging if Hgb continues to downtrend or pain significantly worsens  - Contact IR if there are additional bleeding concerns given multiple prior interventions described above  - Local right hip/groin access site incision care  - Analgesia, see below  - PT and OT  Acute pain due to trauma  - Evaluated by APS on acute care  - Tylenol 975 mg q8h scheduled  - Gabapentin 100 mg three times weekly  - Methocarbamol 750 mg q8h scheduled  - Lidocaine patches daily PRN  - Oxycodone 5 or 10 mg q4h PRN, wean as possible  Impaired mobility and activities of daily living  - Rehabilitation medicine physician for daily monitoring of care, 24 hour availability for acute medical issues, medication management, and therapeutic and diagnostic assessments.  - 24 hour rehabilitation nursing 7 days per week for: management/teaching of  medications, bowel/bladder routine, skin care.  - PT, OT for 2-3 hours per day, 5 to 6 days per week; 15 hours per week  - Rehabilitation Psychology as needed for adjustment and coping  - CM for barriers to discharge, community resources, and family support  - Discharge planning following to help ensure a safe and efficient discharge  - 900/7 program due to HD status, pain, expected therapy tolerance    L2 vertebral fracture (HCC)  > Sustained from a mechanical fall on 3/24/25, found to have an acute, obliquely oriented fracture of the L2 vertebral body with distraction of the dominant fracture fragment and involvement of the anterior and posterior cortices and superior endplate  > At that time, managed non-operatively with LSO bracing, though it does not appear orthopedic spine surgery or neurosurgery evaluated at the time of injury    - Maintain LSO brace with lumbar spinal precautions   - Should follow-up with spine surgery outpatient  Closed fracture of proximal end of left humerus with routine healing  - Sustained from fall in early February, per CT of the LUE on 2/9/25, there was a minimally displaced fracture of the anterior facet of the greater tuberosity of the left humerus, minimally apparent on repeat x-ray of the shoulder on 3/24/25  - He was evaluated by orthopedic surgery at the time of the initial injury and was recommended to maintain NWB through the LUE with a sling  - There is no further orthopedic evaluation documentation and it does not appear that further follow-up occurred.  - Clinically, he is not describing ongoing significant left arm or shoulder pain and has been weight-bearing through the LUE  > 4/14: Repeat humerus X-ray done yesterday shows no change in fracture alignment. Ortho consulted.  >4/15: Ortho c/s: May WBAT to LUE. PT/OT to increase strength and ROM. F/u with Dr. Cisneros in 4 weeks    - Monitor  - PT/OT  Insomnia  Patient reports he has an overall a poor sleep hygiene: he  "sleeps at 1-2am nightly and watches TV up until bed.  Advised patient to attempt to regulate sleep/wake cycle while here to better participate in therapy. Attempt to try to sleep earlier around midnight  >4/15: reviewed sleep log: Pt went to sleep around midnight and slept for 6 hours.    - Continue sleep log  - Continue melatonin 6mg nightly  At risk for constipation  > Last BM 4/11   4/15- Adjusted bowel medications: Colace 100mg BID, Senna 17.2mg with lunch and PRN Miralax/Suppos  - Monitor closely for constipation and/or incontinence. Will follow BMs and adjust bowel regimen to target soft, formed stools q1-2 days, per pt's regular schedule.  Anemia due to chronic kidney disease, on chronic dialysis (HCC)  > 4/14: Hgb 7.6  > 4/15: Hgb 7.2 - Discussed with IM who is coordinating with Nephrology; they will increase his Epoetin jessica    - Trend Hgb on CBC  - Epoetin jessica with dialysis  - Transfusions per IM, appreciate their recommendations  Visual disturbance  - Reporting \"floater\" within the right eye with acute onset on 4/11/25 at mid-day while watching TV; describes a dark, \"spider\"-shaped floater that occupies a significant portion of the visual field, painless; has similar floaters in the left eye but smaller  - Ophthalmology consulted on [facility] admission by IM, per prior trauma surgery documentation, Dr. Chaudhry of ophthalmology was made personally aware of clinical situation on 4/11/25 prior to [facility] admission  > Ophthalmology consult 4/12: Exam c/w proliferative diabetic retinopathy with vitreous hemorrhage. Pt will follow up with ophtho immediately after discharge and for referral to a retina specialist  > 4/15- Pt thinks his R eye floater may be larger today, unable to clearly state or quantify symptoms. No other visual complaints of increased number of floaters, double/blurry vision, or bright flashes. Advised patient to inform us if symptoms change - will reach out to Ophthalmology accordingly    - Monitor " "for changes in vision  Diabetic ulcer of left midfoot associated with type 2 diabetes mellitus, limited to breakdown of skin (HCC)  - Has chronic wounds affecting the left foot plantar surface, left foot 1st and 2nd digits  - For left medial foot, left plantar surface: cleanse with NSS and pat dry. Apply dermagran to plantar wound only. Cover both wounds with silicone bordered foam dressings. Naren with T for Treatment and change every other day or PRN  - Pain left foot 1st and 2nd digits with betadine daily  - See \"at risk for skin breakdown\" for preventive care  - Should follow-up closely with podiatry in the outpatient setting after [facility]C discharge, last outpatient documentation appears to be from 2023  Hyponatremia  > 4/14: Na 129 - A&O x3, pt mentating appropriately  > 4/15: Na 125 - Discussed with IM who is coordinating with Nephrology; they will manage Na during HD     - Trend on BMP  - Electrolyte management per nephrology  - Continue HD in setting of ESRD  ESRD (end stage renal disease) (HCC)  - Continue HD  - Nephrology consulted and following, appreciate their recommendations  Paroxysmal atrial fibrillation (HCC)  - Currently rate controlled  - Embolic stroke risk reduction with apixaban 5 mg BID  Chronic diastolic (congestive) heart failure (HCC)  Wt Readings from Last 3 Encounters:   04/16/25 96 kg (211 lb 9.6 oz)   04/11/25 98.5 kg (217 lb 2.5 oz)   03/24/25 107 kg (234 lb 12.6 oz)     - Most recent TTE from 10/9/24 with EF 60-65%, grade 1 diastolic dysfunction  - Volume management with HD per nephrology  - Monitor weights  - Appreciate medicine and nephrology recommendations and management  Primary hypertension  - Nifedipine  - Appreciate IM and nephrology management  Gout  - Allopurinol for flare prophylaxis  Hyperphosphatemia  - Management per nephrology  - Continue phosphate binders  Wound of skin  - Has right anterior pre-tibial scab, wash gently daily, leave open to air  - Wound care RN consulted " and following  At high risk for skin breakdown  - Moisturize skin daily with skin nourishing cream  - Ehob cushion in chair when out of bed.  - Preventative hydraguard to bilateral heels BID and PRN.   - Calazime paste to sacrum/buttocks BID and PRN  - Monitor clinically for breakdown, frequent turns  - Wound care RN consulted and following  At risk for venous thromboembolism (VTE)  - Apixaban, see above    Subjective   64-year-old with past medical history of acute on chronic anemia, atrial fibrillation, 5 cigarettes, diabetes, end-stage renal disease on dialysis presenting on 4/2 with progressive worsening low back pain after falling and having a large left-sided retroperitoneal hematoma with active extravasation initially treated with Kcentra and stabilized with transfusion and transferred to [facility] for IR evaluation embolization and completed on 4/they will continue to have decreasing hemoglobin found to have a 5 mm inferior gluteal artery branch pseudoaneurysm and did require an additional IR pelvic angiogram with ambulation Dacian on 4/7. Course complicated by acute pain     Chief Complaint: f/u ambulatory dysfunction    Interval: Patient seen and evaluated in therapy session.  Continues to have good progress and very dismissive when I try to talk about discharge planning.  I did discuss that as he continues to progress that this is absolutely necessary to come up with a discharge plan.  I will reach out to his sister for further updates as far as the potential for discharging to her home for even a temporary period of time.  If not we will need to look for alternative options.  No fevers, chills, nausea vomiting alcohol course was with with diarrhea constipation.  States he is in pain but participating but is also somnolent at times and would not recommend up titration of his medication for pain at any point due to his overall tolerance despite asking.  He is 50 to 75% of his meals, voiding  without incontinence and his last bowel movement is on 4/13.  No new labs today    Objective :  Temp:  [97.5 °F (36.4 °C)-98.1 °F (36.7 °C)] 98 °F (36.7 °C)  HR:  [59-88] 59  BP: (111-146)/(53-81) 111/56  Resp:  [16-18] 18  SpO2:  [95 %-100 %] 100 %  O2 Device: None (Room air)    Functional Update:  Mobility: Set up supervision for bed mobility and supervision to close supervision for transfers using the rolling walker.  Able to ambulate 175 feet with rolling walker using contact-guard  ADLs: For bathing and upper body ADLs and moderate to max assist for lower body ADLs        Scheduled Meds:  Current Facility-Administered Medications   Medication Dose Route Frequency Provider Last Rate    acetaminophen  975 mg Oral Q8H Replaced by [facility] Damian Cerda MD      allopurinol  100 mg Oral Daily Damian Cerda MD      apixaban  5 mg Oral BID Damian Cerda MD      atorvastatin  80 mg Oral Daily With Dinner Damian Cerda MD      bisacodyl  10 mg Rectal Daily PRN Michelle T Landa, DO      docusate sodium  100 mg Oral BID Michelle T Landa, DO      epoetin jessica  8,000 Units Intravenous After Dialysis German Linton MD      gabapentin  100 mg Oral Once per day on Monday Wednesday Friday Damian Cerda MD      lidocaine  1 patch Topical Daily PRN Damian Cerda MD      melatonin  6 mg Oral HS Jayden Rowland MD      methocarbamol  1,000 mg Oral Q8H Replaced by [facility] Jayden Rowland MD      naloxone  0.04 mg Intravenous Q1MIN PRN Damian Cerda MD      NIFEdipine  30 mg Oral After Dinner Damian Cerda MD      oxyCODONE  5 mg Oral Q4H PRN Damian Cerda MD      Or    oxyCODONE  10 mg Oral Q4H PRN Damian Cerda MD      oxyCODONE  2.5 mg Oral Q8H PRN Jayden Rowland MD      polyethylene glycol  17 g Oral Daily PRN Michelle T Landa, DO      senna  2 tablet Oral Daily With Lunch Michelle T Landa, DO      sevelamer  1,600 mg Oral TID With Meals Damian Cerda MD      white petrolatum-mineral oil   Topical TID PRN Michelle Landa, DO           Lab Results: I have  reviewed the following results:  Results from last 7 days   Lab Units 04/15/25  0428 04/14/25  0611 04/13/25  1307 04/11/25  0508   HEMOGLOBIN g/dL 7.2* 7.6* 8.0* 7.4*   HEMATOCRIT % 22.8* 24.7* 25.4* 25.0*   WBC Thousand/uL 8.35 9.11  --  7.55   PLATELETS Thousands/uL 318 336  --  378     Results from last 7 days   Lab Units 04/15/25  0447 04/14/25  0611 04/11/25  0508   BUN mg/dL 55* 39* 30*   SODIUM mmol/L 125* 129* 133*   POTASSIUM mmol/L 4.7 4.7 4.4   CHLORIDE mmol/L 89* 93* 96   CREATININE mg/dL 7.54* 6.16* 4.63*                Santos Guan,   Physical Medicine and Rehabilitation  [facility]    I have spent a total time of 35 minutes in caring for this patient on the day of the visit/encounter including Counseling / Coordination of care, Documenting in the medical record, and Communicating with other healthcare professionals .

## 2025-04-16 NOTE — ASSESSMENT & PLAN NOTE
This is due to lack of free water clearance and probably excessive fluid intake  Sodium level 125 on 04/15  Continue fluid restriction 1200 cc per 24 hours  138 mEq sodium bath on dialysis today  Continue ultrafiltration as tolerated on HD  Check BMP tomorrow prior to dialysis

## 2025-04-16 NOTE — PROGRESS NOTES
04/16/25 1030   Pain Assessment   Pain Assessment Tool 0-10   Pain Score 4   Pain Location/Orientation Orientation: Right;Location: Abdomen;Orientation: Lower;Location: Back   Restrictions/Precautions   Precautions Bed/chair alarms;Supervision on toilet/commode;Visual deficit;Fluid restriction;Fall Risk   Braces or Orthoses LSO   General   Change In Medical/Functional Status Trialing using w/c at bedside with sacral relief cushion vs recliner for comfort.   Subjective   Subjective Tired at end of session. Reports feeling more comfortable sitting in w/c at bedside vs recliner.   Sit to Stand   Type of Assistance Needed Supervision   Comment RW   Sit to Stand CARE Score 4   Bed-Chair Transfer   Type of Assistance Needed Supervision;Adaptive equipment   Comment RW   Chair/Bed-to-Chair Transfer CARE Score 4   Walk 10 Feet   Type of Assistance Needed Supervision;Adaptive equipment   Comment RW   Walk 10 Feet CARE Score 4   Walk 50 Feet with Two Turns   Type of Assistance Needed Supervision;Adaptive equipment   Comment RW   Walk 50 Feet with Two Turns CARE Score 4   Walk 150 Feet   Type of Assistance Needed Supervision;Adaptive equipment   Comment RW   Walk 150 Feet CARE Score 4   Ambulation   Distance Walked (feet) 150 ft  (150x 4)   Assist Device Roller Walker   Curb or Single Stair   Style negotiated Single stair   Type of Assistance Needed Supervision   Comment CS with B HR   1 Step (Curb) CARE Score 4   4 Steps   Type of Assistance Needed Supervision   Comment CS with single left HR and 4 steps with B HR   4 Steps CARE Score 4   12 Steps   Reason if not Attempted Activity not applicable   12 Steps CARE Score 9   Stairs   # of Steps 4  (4 steps x 3 with seated rest breaks)   Picking Up Object   Type of Assistance Needed Supervision;Adaptive equipment   Comment reacher and RW   Picking Up Object CARE Score 4   Therapeutic Interventions   Strengthening LAQ 4 x 10, sidestepping left/right 6ft x 2 for 3 bouts using HR  in james   Assessment   Treatment Assessment Therapy session again focused on repeated walking using RW for conditioning and strengthening. Fair tolerance for seated Ther Ex due to abdomen. Unable to use Nustep for exercise due to limited Ankle ROM. Improved balance on stairs noted today with good foot clearnce and placement on steps. Practiced with single LEft HR and B HR. Progresing well overall, however vision deficits remain greatest barrier for [place] at this time.   Plan   Progress Progressing toward goals   PT Therapy Minutes   PT Time In 1030   PT Time Out 1130   PT Total Time (minutes) 60   PT Mode of treatment - Individual (minutes) 60   PT Mode of treatment - Concurrent (minutes) 0   PT Mode of treatment - Group (minutes) 0   PT Mode of treatment - Co-treat (minutes) 0   PT Mode of Treatment - Total time(minutes) 60 minutes   PT Cumulative Minutes 330   Therapy Time missed   Time missed? No

## 2025-04-16 NOTE — ASSESSMENT & PLAN NOTE
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   Ortho saw here on 4/14 = WBAT; see Dr Cisneros 4 weeks  "

## 2025-04-16 NOTE — PLAN OF CARE
Problem: PAIN - ADULT  Goal: Verbalizes/displays adequate comfort level or baseline comfort level  Description: Interventions:- Encourage patient to monitor pain and request assistance- Assess pain using appropriate pain scale- Administer analgesics based on type and severity of pain and evaluate response- Implement non-pharmacological measures as appropriate and evaluate response- Consider cultural and social influences on pain and pain management- Notify physician/advanced practitioner if interventions unsuccessful or patient reports new pain  Outcome: Progressing     Problem: INFECTION - ADULT  Goal: Absence or prevention of progression during hospitalization  Description: INTERVENTIONS:- Assess and monitor for signs and symptoms of infection- Monitor lab/diagnostic results- Monitor all insertion sites, i.e. indwelling lines, tubes, and drains- Monitor endotracheal if appropriate and nasal secretions for changes in amount and color- [place] appropriate cooling/warming therapies per order- Administer medications as ordered- Instruct and encourage patient and family to use good hand hygiene technique- Identify and instruct in appropriate isolation precautions for identified infection/condition  Outcome: Progressing  Goal: Absence of fever/infection during neutropenic period  Description: INTERVENTIONS:- Monitor WBC  Outcome: Progressing     Problem: SAFETY ADULT  Goal: Patient will remain free of falls  Description: INTERVENTIONS:- Educate patient/family on patient safety including physical limitations- Instruct patient to call for assistance with activity - Consult OT/PT to assist with strengthening/mobility - Keep Call bell within reach- Keep bed low and locked with side rails adjusted as appropriate- Keep care items and personal belongings within reach- Initiate and maintain comfort rounds- Make Fall Risk Sign visible to staff- Offer Toileting every  Hours, in advance of need- Initiate/Maintain alarm- Obtain  necessary fall risk management equipment: - Apply yellow socks and bracelet for high fall risk patients- Consider moving patient to room near nurses station  Outcome: Progressing  Goal: Maintain or return to baseline ADL function  Description: INTERVENTIONS:-  Assess patient's ability to carry out ADLs; assess patient's baseline for ADL function and identify physical deficits which impact ability to perform ADLs (bathing, care of mouth/teeth, toileting, grooming, dressing, etc.)- Assess/evaluate cause of self-care deficits - Assess range of motion- Assess patient's mobility; develop plan if impaired- Assess patient's need for assistive devices and provide as appropriate- Encourage maximum independence but intervene and supervise when necessary- Involve family in performance of ADLs- Assess for home care needs following discharge - Consider OT consult to assist with ADL evaluation and planning for discharge- Provide patient education as appropriate  Outcome: Progressing  Goal: Maintains/Returns to pre admission functional level  Description: INTERVENTIONS:- Perform AM-PAC 6 Click Basic Mobility/ Daily Activity assessment daily.- Set and communicate daily mobility goal to care team and patient/family/caregiver. - Collaborate with rehabilitation services on mobility goals if consulted- Perform Range of Motion  times a day.- Reposition patient every  hours.- Dangle patient  times a day- Stand patient  times a day- Ambulate patient  times a day- Out of bed to chair  times a day - Out of bed for meals  times a day- Out of bed for toileting- Record patient progress and toleration of activity level   Outcome: Progressing     Problem: DISCHARGE PLANNING  Goal: Discharge to home or other facility with appropriate resources  Description: INTERVENTIONS:- Identify barriers to discharge w/patient and caregiver- Arrange for needed discharge resources and transportation as appropriate- Identify discharge learning needs (meds, wound  care, etc.)- Arrange for interpretive services to assist at discharge as needed- Refer to Case Management Department for coordinating discharge planning if the patient needs post-hospital services based on physician/advanced practitioner order or complex needs related to functional status, cognitive ability, or social support system  Outcome: Progressing     Problem: Prexisting or High Potential for Compromised Skin Integrity  Goal: Skin integrity is maintained or improved  Description: INTERVENTIONS:- Identify patients at risk for skin breakdown- Assess and monitor skin integrity- Assess and monitor nutrition and hydration status- Monitor labs - Assess for incontinence - Turn and reposition patient- Assist with mobility/ambulation- Relieve pressure over bony prominences- Avoid friction and shearing- Provide appropriate hygiene as needed including keeping skin clean and dry- Evaluate need for skin moisturizer/barrier cream- Collaborate with interdisciplinary team - Patient/family teaching- Consider wound care consult   Outcome: Progressing     Problem: METABOLIC, FLUID AND ELECTROLYTES - ADULT  Goal: Electrolytes maintained within normal limits  Description: INTERVENTIONS:- Monitor labs and assess patient for signs and symptoms of electrolyte imbalances- Administer electrolyte replacement as ordered- Monitor response to electrolyte replacements, including repeat lab results as appropriate- Instruct patient on fluid and nutrition as appropriate  Outcome: Progressing  Goal: Fluid balance maintained  Description: INTERVENTIONS:- Monitor labs - Monitor I/O and WT- Instruct patient on fluid and nutrition as appropriate- Assess for signs & symptoms of volume excess or deficit  Outcome: Progressing

## 2025-04-16 NOTE — ASSESSMENT & PLAN NOTE
- Sustained from fall in early February, per CT of the LUE on 2/9/25, there was a minimally displaced fracture of the anterior facet of the greater tuberosity of the left humerus, minimally apparent on repeat x-ray of the shoulder on 3/24/25  - He was evaluated by orthopedic surgery at the time of the initial injury and was recommended to maintain NWB through the LUE with a sling  - There is no further orthopedic evaluation documentation and it does not appear that further follow-up occurred.  - Clinically, he is not describing ongoing significant left arm or shoulder pain and has been weight-bearing through the LUE  > 4/14: Repeat humerus X-ray done yesterday shows no change in fracture alignment. Ortho consulted.  >4/15: Ortho c/s: May WBAT to LUE. PT/OT to increase strength and ROM. F/u with Dr. Cisneros in 4 weeks    - Monitor  - PT/OT

## 2025-04-17 ENCOUNTER — LAB REQUISITION (OUTPATIENT)
Dept: LAB | Facility: HOSPITAL | Age: 65
DRG: 939 | End: 2025-04-17
Payer: MEDICARE

## 2025-04-17 ENCOUNTER — APPOINTMENT (INPATIENT)
Dept: DIALYSIS | Facility: HOSPITAL | Age: 65
DRG: 939 | End: 2025-04-17
Attending: INTERNAL MEDICINE
Payer: MEDICARE

## 2025-04-17 DIAGNOSIS — S36.892A CONTUSION OF OTHER INTRA-ABDOMINAL ORGANS, INITIAL ENCOUNTER: ICD-10-CM

## 2025-04-17 PROBLEM — K59.00 CONSTIPATION: Status: ACTIVE | Noted: 2025-04-17

## 2025-04-17 PROBLEM — K59.00 CONSTIPATION: Status: RESOLVED | Noted: 2025-04-17 | Resolved: 2025-04-17

## 2025-04-17 LAB
ABO GROUP BLD: NORMAL
ANION GAP SERPL CALCULATED.3IONS-SCNC: 12 MMOL/L (ref 4–13)
BLD GP AB SCN SERPL QL: POSITIVE
BLOOD GROUP ANTIBODIES SERPL: NORMAL
BUN SERPL-MCNC: 47 MG/DL (ref 5–25)
CALCIUM SERPL-MCNC: 9.2 MG/DL (ref 8.4–10.2)
CHLORIDE SERPL-SCNC: 94 MMOL/L (ref 96–108)
CO2 SERPL-SCNC: 25 MMOL/L (ref 21–32)
CREAT SERPL-MCNC: 6.83 MG/DL (ref 0.6–1.3)
ERYTHROCYTE [DISTWIDTH] IN BLOOD BY AUTOMATED COUNT: 17.2 % (ref 11.6–15.1)
GFR SERPL CREATININE-BSD FRML MDRD: 7 ML/MIN/1.73SQ M
GLUCOSE SERPL-MCNC: 94 MG/DL (ref 65–140)
HCT VFR BLD AUTO: 24.5 % (ref 36.5–49.3)
HGB BLD-MCNC: 7.7 G/DL (ref 12–17)
MCH RBC QN AUTO: 29.7 PG (ref 26.8–34.3)
MCHC RBC AUTO-ENTMCNC: 31.4 G/DL (ref 31.4–37.4)
MCV RBC AUTO: 95 FL (ref 82–98)
PLATELET # BLD AUTO: 324 THOUSANDS/UL (ref 149–390)
PMV BLD AUTO: 9.3 FL (ref 8.9–12.7)
POTASSIUM SERPL-SCNC: 4.2 MMOL/L (ref 3.5–5.3)
RBC # BLD AUTO: 2.59 MILLION/UL (ref 3.88–5.62)
RH BLD: POSITIVE
SODIUM SERPL-SCNC: 131 MMOL/L (ref 135–147)
SPECIMEN EXPIRATION DATE: NORMAL
WBC # BLD AUTO: 7.96 THOUSAND/UL (ref 4.31–10.16)

## 2025-04-17 PROCEDURE — 99232 SBSQ HOSP IP/OBS MODERATE 35: CPT | Performed by: NURSE PRACTITIONER

## 2025-04-17 PROCEDURE — 80048 BASIC METABOLIC PNL TOTAL CA: CPT | Performed by: NURSE PRACTITIONER

## 2025-04-17 PROCEDURE — 86900 BLOOD TYPING SEROLOGIC ABO: CPT | Performed by: NURSE PRACTITIONER

## 2025-04-17 PROCEDURE — 86870 RBC ANTIBODY IDENTIFICATION: CPT | Performed by: STUDENT IN AN ORGANIZED HEALTH CARE EDUCATION/TRAINING PROGRAM

## 2025-04-17 PROCEDURE — 97110 THERAPEUTIC EXERCISES: CPT

## 2025-04-17 PROCEDURE — 99233 SBSQ HOSP IP/OBS HIGH 50: CPT | Performed by: INTERNAL MEDICINE

## 2025-04-17 PROCEDURE — 99233 SBSQ HOSP IP/OBS HIGH 50: CPT | Performed by: STUDENT IN AN ORGANIZED HEALTH CARE EDUCATION/TRAINING PROGRAM

## 2025-04-17 PROCEDURE — 85027 COMPLETE CBC AUTOMATED: CPT | Performed by: NURSE PRACTITIONER

## 2025-04-17 PROCEDURE — 97116 GAIT TRAINING THERAPY: CPT

## 2025-04-17 PROCEDURE — 97535 SELF CARE MNGMENT TRAINING: CPT

## 2025-04-17 PROCEDURE — 86850 RBC ANTIBODY SCREEN: CPT | Performed by: NURSE PRACTITIONER

## 2025-04-17 PROCEDURE — 86901 BLOOD TYPING SEROLOGIC RH(D): CPT | Performed by: NURSE PRACTITIONER

## 2025-04-17 RX ORDER — LACTULOSE 10 G/15ML
20 SOLUTION ORAL ONCE
Status: COMPLETED | OUTPATIENT
Start: 2025-04-17 | End: 2025-04-17

## 2025-04-17 RX ADMIN — SEVELAMER HYDROCHLORIDE 1600 MG: 800 TABLET ORAL at 17:04

## 2025-04-17 RX ADMIN — APIXABAN 5 MG: 5 TABLET, FILM COATED ORAL at 17:03

## 2025-04-17 RX ADMIN — OXYCODONE HYDROCHLORIDE 10 MG: 10 TABLET ORAL at 08:45

## 2025-04-17 RX ADMIN — Medication 6 MG: at 22:22

## 2025-04-17 RX ADMIN — DOCUSATE SODIUM 100 MG: 100 CAPSULE, LIQUID FILLED ORAL at 08:45

## 2025-04-17 RX ADMIN — APIXABAN 5 MG: 5 TABLET, FILM COATED ORAL at 08:45

## 2025-04-17 RX ADMIN — ACETAMINOPHEN 975 MG: 325 TABLET, FILM COATED ORAL at 05:17

## 2025-04-17 RX ADMIN — OXYCODONE HYDROCHLORIDE 10 MG: 10 TABLET ORAL at 01:50

## 2025-04-17 RX ADMIN — SEVELAMER HYDROCHLORIDE 1600 MG: 800 TABLET ORAL at 08:46

## 2025-04-17 RX ADMIN — LACTULOSE 20 G: 20 SOLUTION ORAL at 17:03

## 2025-04-17 RX ADMIN — SEVELAMER HYDROCHLORIDE 1600 MG: 800 TABLET ORAL at 11:54

## 2025-04-17 RX ADMIN — NIFEDIPINE 30 MG: 30 TABLET, FILM COATED, EXTENDED RELEASE ORAL at 17:03

## 2025-04-17 RX ADMIN — ALLOPURINOL 100 MG: 100 TABLET ORAL at 08:45

## 2025-04-17 RX ADMIN — ATORVASTATIN CALCIUM 80 MG: 80 TABLET, FILM COATED ORAL at 17:03

## 2025-04-17 RX ADMIN — ACETAMINOPHEN 975 MG: 325 TABLET, FILM COATED ORAL at 22:22

## 2025-04-17 RX ADMIN — EPOETIN ALFA 8000 UNITS: 4000 SOLUTION INTRAVENOUS; SUBCUTANEOUS at 16:51

## 2025-04-17 RX ADMIN — POLYETHYLENE GLYCOL 3350 17 G: 17 POWDER, FOR SOLUTION ORAL at 08:45

## 2025-04-17 RX ADMIN — OXYCODONE HYDROCHLORIDE 10 MG: 10 TABLET ORAL at 22:22

## 2025-04-17 RX ADMIN — DOCUSATE SODIUM 100 MG: 100 CAPSULE, LIQUID FILLED ORAL at 17:03

## 2025-04-17 RX ADMIN — METHOCARBAMOL 1000 MG: 500 TABLET ORAL at 22:22

## 2025-04-17 RX ADMIN — METHOCARBAMOL 1000 MG: 500 TABLET ORAL at 05:17

## 2025-04-17 RX ADMIN — OXYCODONE HYDROCHLORIDE 10 MG: 10 TABLET ORAL at 14:07

## 2025-04-17 RX ADMIN — SENNOSIDES 17.2 MG: 8.6 TABLET, FILM COATED ORAL at 11:53

## 2025-04-17 NOTE — PLAN OF CARE
Target UF Goal  1.5  L as tolerated. Patient dialyzing for 4 hours on 2 K bath for serum K of  4.7 from 4/15/25  per protocol. Treatment plan reviewed with Nephrology.   Problem: METABOLIC, FLUID AND ELECTROLYTES - ADULT  Goal: Electrolytes maintained within normal limits  Description: INTERVENTIONS:- Monitor labs and assess patient for signs and symptoms of electrolyte imbalances- Administer electrolyte replacement as ordered- Monitor response to electrolyte replacements, including repeat lab results as appropriate- Instruct patient on fluid and nutrition as appropriate  Outcome: Progressing  Goal: Fluid balance maintained  Description: INTERVENTIONS:- Monitor labs - Monitor I/O and WT- Instruct patient on fluid and nutrition as appropriate- Assess for signs & symptoms of volume excess or deficit  Outcome: Progressing   Post-Dialysis RN Treatment Note    Blood Pressure:  Pre 135/61 mm/Hg  Post 151/85 mmHg   EDW:  97 kg    Weight:  Pre 97 kg   Post 96 kg   Mode of weight measurement: Standing Scale   Volume Removed:  2000 ml    Treatment duration: 240 minutes    NS given:  No    Treatment shortened No   Medications given during Rx: Oxycodone IR 10 mg,        Epogen 8000 units   Estimated Kt/V:  1.10   Access type: Permacath/TDC   Needle Gauge:  na   Access Issues: No    Report called to primary nurse:   Yes Olga Romero RN

## 2025-04-17 NOTE — ASSESSMENT & PLAN NOTE
- TTS at [facility]  - Access: Right IJ permacath  - EDW 99 kg, last HD postweight 97.2 kg  - HD today

## 2025-04-17 NOTE — PROGRESS NOTES
"OT daily treatment note       04/17/25 0700   Pain Assessment   Pain Assessment Tool 0-10   Pain Score 4   Pain Location/Orientation Location: Back   Hospital Pain Intervention(s) Emotional support   Restrictions/Precautions   Precautions Bed/chair alarms;Fall Risk;Pain;Spinal precautions;Supervision on toilet/commode;Visual deficit  (BLE ace wrapping)   Braces or Orthoses LSO   Lifestyle   Autonomy \"God please, just fix my eyes\"   Eating   Type of Assistance Needed Independent   Physical Assistance Level No physical assistance   Eating CARE Score 6   Oral Hygiene   Type of Assistance Needed Supervision   Physical Assistance Level No physical assistance   Comment in stance at sink   Oral Hygiene CARE Score 4   Shower/Bathe Self   Type of Assistance Needed Physical assistance   Physical Assistance Level 25% or less   Comment SB seated in recliner requiring Min A for bathing BL feet. able to bathe jose de jesus area in stance with CS. would benefit from use of a LHS to increase functional reach and to maintain spinal precautions   Shower/Bathe Self CARE Score 3   Upper Body Dressing   Type of Assistance Needed Supervision   Physical Assistance Level No physical assistance   Comment for shirt donning/doffing and LSO mgmt   Upper Body Dressing CARE Score 4   Lower Body Dressing   Type of Assistance Needed Physical assistance   Physical Assistance Level 25% or less   Comment seated in recliner, pt was able to thread LLE maintaining spinal precautions, having very minimal trunk flexion. Pt attempted to thread RLE but was unable to complete. OT edu on use of LHR and pt refused stating if he were home, he would prop his leg up on the bed and thread., OT asked pt to complete and he stated \"no, another time. please just help me\". Need to make it a priority that he either uses a LHR or trials his method to ensure he can complete this at an IND level.   Lower Body Dressing CARE Score 3   Putting On/Taking Off Footwear   Type of " Assistance Needed Physical assistance   Physical Assistance Level 76% or more   Comment trialed ava sock aide which pt was agreeable to trial today.  pt was able to place on the sock aide but demonstrated some difficulty w/ donning on feet due to his feet being so edematous and the ace wraps were causing friction but with inc time, he was able to don over toes but did need to adjust them over his heel. he was able to functionally reach to do so but it was pushing the boundaries of maintaining his spinal precautions. will need to problem solve this in upcoming sessions or see if pt can purchase wide slide on shoes that he doesnt need to wear with socks?   Putting On/Taking Off Footwear CARE Score 2   Sit to Stand   Type of Assistance Needed Supervision   Physical Assistance Level No physical assistance   Comment RW   Sit to Stand CARE Score 4   Bed-Chair Transfer   Type of Assistance Needed Supervision   Physical Assistance Level No physical assistance   Comment RW   Chair/Bed-to-Chair Transfer CARE Score 4   Toileting Hygiene   Type of Assistance Needed Supervision   Physical Assistance Level No physical assistance   Comment CM/hygiene   Toileting Hygiene CARE Score 4   Toilet Transfer   Type of Assistance Needed Supervision   Physical Assistance Level No physical assistance   Comment RW on standard toilet height   Toilet Transfer CARE Score 4   Commmunity Re-entry   Community Re-entry Level Walker   Community Re-entry Level of Assistance Close supervision   Community Re-entry Pt participated in functional mobility task with use of RW to simulate home and community distances required for ADL/IADL completion. Task focused on increasing functional activity tolerance, endurance, strength and dynamic standing balance to improve independence with ADL completion.   Cognition   Overall Cognitive Status Impaired   Arousal/Participation Alert   Attention Attends with cues to redirect   Orientation Level Oriented X4   Memory  "Decreased recall of precautions   Following Commands Follows multistep commands with increased time or repetition   Additional Activities   Additional Activities Other (Comment)   Additional Activities Comments pt was in slightly better spirits today and more agreeable to discuss DC planning with OT. To note, pt does not have his own home - he states he lost his home when \"het got sick\". Pt has been living in his sisters apartment alone for ~5 years. His sister lives with her fiance in the [facility] home but the sister has kept her apartment to allow him to have somewhere to live. Pt also reported that the dynamic between his sisters fiance and him are not great so the fiance will likely not allow the pt to DC to the [facility] home but he can return to his sisters apartment \"if we let him\". OT encouraged pt by stating that he can absolutely return to living at his sisters apt IND as long as he continues to participate and demonstrates to the team that he can safely live IND. OT then discussed some barriers to DC home mainly focusing on his visual impairment that impacts his ability to drive to dialysis, other doctor appts, groceries, etc along with managing his medications as at this point, he will not be able to read a medication label. Pt stated his sister has offered to get groceries for him so he is covered in that regard. Pt reports not having any other support to drive him to dialysis. OT edu on some community resources for visually impaired in [place] that could potentially assist with transportation. Pt is agreeable to allow for the team to make inquiries, stating he cannot use his phone so he cannot see it so he isnt able to do the research himself right now. This information was passed along to the medical/therapy team to allow for further discussion re: DC planning.   Activity Tolerance   Activity Tolerance Patient tolerated treatment well   Assessment   Treatment Assessment Pt participated in skilled OT session " with focus on ADL retraining, functional transfer training, endurance training, compensatory technique education, continued education and DC planning. See flowsheet for details of session and current functional status. Pt is limited by weakness, impaired balance, decreased endurance, increased fall risk, decreased ADLS, decreased IADLS, pain, decreased activity tolerance, ortheopedic restrictions, decreased strength, and visual deficits and requires skilled OT services to increase independence and safety with ADL completion in prep for DC home. Per the primary OT, a DC date has been set for Wednesday 4/23 w/ HH therapies. It is of upmost importance to focus on increasing the pts independence w/ LB dressing and footwear but also focusing on IADL retraining including meal prep, medication mgmt, finance mgmt, laundry, etc. If pt uses his phone for a majority of his finance mgmt then it might be beneficial to change the settings on his phone for someone who is visually impaired (ie. Increasing text size, adding an auditory piece to it, educating on using sherice, etc.). Pt does tend to be apprehensive to trialing different things especially when he is experiencing increased pain but in order to reach an IND level by Wednesday, he will need to address all of these tasks. Pending his progress, we can look at progressing to IRP on Monday.   Prognosis Fair   Problem List Decreased endurance;Impaired balance;Decreased mobility;Pain;Decreased safety awareness;Impaired judgement   Barriers to Discharge Inaccessible home environment;Decreased caregiver support   Plan   Treatment/Interventions ADL retraining;Functional transfer training;LE strengthening/ROM;Therapeutic exercise;Endurance training;Patient/family training;Equipment eval/education;Compensatory technique education   Progress Progressing toward goals   OT Therapy Minutes   OT Time In 0700   OT Time Out 0830   OT Total Time (minutes) 90   OT Mode of treatment -  Individual (minutes) 90   OT Mode of treatment - Concurrent (minutes) 0   OT Mode of treatment - Group (minutes) 0   OT Mode of treatment - Co-treat (minutes) 0   OT Mode of Treatment - Total time(minutes) 90 minutes   OT Cumulative Minutes 384   Therapy Time missed   Time missed? No

## 2025-04-17 NOTE — PCC CARE MANAGEMENT
Pt has achieved goals functionally but arrangement of dialysis and transportation continue to be a barrier. Family meeting held and they are aware of pts abilities. Cm to continue to work on dc barriers and arrange hhc when dc date is known.

## 2025-04-17 NOTE — PCC NURSING
65 y/o male with a medical history of but not limited to chronic anemia; DM; ESRD on HD; hyperlipidemia ; HTN ; hyperkalemia ; left toe amputation ; TIA  who initially presented to [facility] on 4/2/25 after falling a few days prior onto a rail but had progressive pain to lower mid back. Of note he was recently admitted to the hospital after a fall where he was diagnosed with an L2 fracture. He reported multiple recent falls with no dizziness, chest pain, lightheadedness. His falls were described as mechanical. He was found to have a large retroperitoneal hematoma on the right and the fore-mentioned L2 fracture. CTA of the abdomen and pelvis showed extravasation of contrast. He was transferred to [facility] on 4/3/25 for further eval / assessment / consultation. During hospital course / stay he went to IR for embolization of his deep circumflex iliac artery and superior gluteal artery. Nephrology was consulted for hemodialysis management. His hemoglobin trended down and he was transfused with a total of 4 PBRCs during hospital course.  He underwent CTA of the abdomen and pelvis which showed a 5 mm pseudoaneurysm of the inferior gluteal artery branch.  He underwent embolization of the pseudoaneurysm.  He was started on subcu heparin for DVT prophylaxis and his hemoglobin closely monitored with stable hemoglobin send DVT prophylaxis he was transition to his home Eliquis dosing.  His hemoglobin remained stable on Eliquis. He is WBAT to all extremities. Pain has been managed with PO and IV PRN analgesics. PT/OT therapies were consulted and continue to follow patient at this time and are recommending inpatient acute rehab when medically stable. All involved medical disciplines feel/agree patient is medically ready for discharge at this time. Inpatient acute rehabilitation physician was consulted. Upon review of patient's case and correspondence with PT/OT therapy services, [facility] physician  feels Naresh will benefit and is a good candidate / appropriate for inpatient acute rehab at this time. He has demonstrated the willingness / desire and tolerance to participate in the required 3 hours or more of therapies per day.     5/1/25-This week we will encourage independence with ADLs.  We will monitor labs and vital signs.  We will educate pt/family about repositioning to prevent skin breakdown.  We will assist w repositioning and perform routine skin checks. We will continue to assist and educate with dsg changes. We will monitor for adequate pain control.  We will monitor for constipation and medicate pt as ordered.  We will increase safety awareness and keep pt free from falls.

## 2025-04-17 NOTE — TEAM CONFERENCE
Acute RehabilitationTeam Conference Note  Date: 4/17/2025   Time: 10:55 AM       Patient Name:  Naresh Carpio         Medical Record Number: [id number]   YOB: 1960  Sex: Male          Room/Bed:  [facility] 964-01  Payor Info:  Payor: MEDICARE / Plan: MEDICARE A AND B / Product Type: Medicare A & B Fee for Service /      Admitting Diagnosis: Traumatic retroperitoneal hemorrhage [S36.899A]   Admit Date/Time:  4/11/2025  6:19 PM  Admission Comments: No comment available     Primary Diagnosis:  Traumatic retroperitoneal hematoma  Principal Problem: Traumatic retroperitoneal hematoma    Patient Active Problem List    Diagnosis Date Noted    Insomnia 04/13/2025    Hyperphosphatemia 04/12/2025    Wound of skin 04/12/2025    At high risk for skin breakdown 04/12/2025    At risk for venous thromboembolism (VTE) 04/12/2025    Impaired mobility and activities of daily living 04/12/2025    Visual disturbance 04/11/2025    Pseudoaneurysm (HCC) 04/08/2025    Acute pain due to trauma 04/05/2025    ESRD on dialysis ([facility]) 04/04/2025    Wounds, multiple 04/04/2025    Traumatic retroperitoneal hemorrhage 04/03/2025    Secondary hyperparathyroidism ([facility]) 04/03/2025    At risk for electrolyte imbalance 04/03/2025    Multiple open wounds of foot 03/26/2025    Disorder of acid-base balance 03/24/2025    L2 vertebral fracture ([facility]) 03/24/2025    Non-compliance with treatment 03/24/2025    Generalized weakness 02/24/2025    Gout 02/18/2025    Closed fracture of proximal end of left humerus with routine healing 02/10/2025    Left shoulder pain 02/08/2025    ESRD (end stage renal disease) on dialysis ([facility]) 01/16/2025    Primary hypertension 01/16/2025    Anemia in ESRD (end-stage renal disease)  ([facility]) 01/16/2025    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, on chronic dialysis ([facility]) 01/16/2025    Renal lesion 12/19/2024    Chronic wound 11/20/2024    Chronic diastolic  (congestive) heart failure ([facility]) 11/19/2024    Pleural effusion 11/19/2024    Paroxysmal atrial fibrillation ([facility])     ESRD (end stage renal disease) ([facility]) 10/25/2024    Hyponatremia 10/19/2024    Acute on chronic anemia 10/14/2024    PAD (peripheral artery disease) ([facility]) 10/08/2024    Difficulty with speech 03/19/2024    History of amputation of hallux ([facility]) 03/18/2024    At risk for constipation 09/06/2023    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 mellitus, with necrosis of bone ([facility])     Acquired deformity of foot, right     Charcot's joint     Open wound of right foot 08/21/2023    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 mellitus, with bone involvement without evidence of necrosis ([facility])     Diabetic ulcer of left midfoot associated with type 2 diabetes mellitus, limited to breakdown of skin ([facility])     Diabetic polyneuropathy associated with type 2 diabetes mellitus ([facility])     Diabetes mellitus type 2 with peripheral artery disease ([facility])     History of amputation of lesser toe of left foot ([facility])     Onychomycosis     Traumatic retroperitoneal hematoma 08/19/2023    Osteoarthritis of left hip 07/27/2022    Severe Left Orchalgia 07/26/2022    Right shoulder pain 07/26/2022    Left hip pain 07/06/2022    Hemodialysis status ([facility])     Hypervolemia     Anemia due to chronic kidney disease, on chronic dialysis ([facility]) 01/21/2022    History of prediabetes     Hypertension     History of TIA (transient ischemic attack)     T10 vertebral fracture ([facility])        Physical Therapy:         No notes on file    Occupational Therapy:  Eating: Independent  Grooming: Minimal Assistance  Bathing: Minimal Assistance  Bathing: Minimal Assistance  Upper Body Dressing: Minimal Assistance  Lower Body Dressing: Moderate Assistance  Toileting: Maximum Assistance  Tub/Shower Transfer: Supervision  Toilet Transfer: Supervision  Cognition: Exceptions to WNL  Cognition: Decreased Memory, Decreased  Executive Functions, Decreased Safety, Impulsive, Behavioral Considerations  Orientation: Person, Place, Situation  Discharge Recommendations: Home with:  DC Home with:: Family Support, First Floor Setup       04/16/25  Pt is demonstrating fair progress with occupational therapy and is progressing toward long term goals for ADL, IADL, and functional transfers/mobility. Pts long term goals for ADLs are Independent with Rolling Walker. Pt is currently Partial/moderate assistance  for ADLs. Pt continues to present with impairments in activity tolerance, endurance, standing balance/tolerance, UE strength, memory, insight, safety , judgement , and visual perceptual skills . Occupational performance remains limited by fatigue, pain, spinal precautions, impulsivity, (R) visual deficits , (L) visual deficits , decreased caregiver support, and risk for falls. Family training/education will be required prior to D/C. Pt will continue to benefit from skilled acute rehab OT services to address above mentioned barriers and maximize functional independence in baseline areas of occupation to meet established treatment goals with overall decreased burden of care. Plan of care to continue to focus on ADL Retraining , LB Dressing, UB dressing,  LHAE education/training, IADL training , Kitchen Mobilty, Meal preparation, Medication management , Functional Transfers, Functional Cognition, Functional Attention, Standing tolerance, Standing balance , Gross motor strengthening , Visual perceptual skills, Visual scanning, Low vision education/training, Family training/education, Energy conservation training/education, healthy coping education, Leisure and social pursuits, and community re-integration.     Anticipate Discharge date to be set.       Speech Therapy:           No notes on file    Nursing Notes:     Diet Type: Regular/House                      Diet Patient/Family Education Complete: No                         Type of Wound  Patient/Family Education: No  Bladder: Continent     Bladder Patient/Family Education: No  Bowel: Continent     Bowel Patient/Family Education: No  Pain Location/Orientation: Location: Back  Pain Score: 6                       Hospital Pain Intervention(s): Medication (See MAR), Repositioned  Pain Patient/Family Education: No  Medication Management/Safety  Safe Administration: No  Reason for non-safe administration: non-compliant    63 y/o male with a medical history of but not limited to chronic anemia; DM; ESRD on HD; hyperlipidemia ; HTN ; hyperkalemia ; left toe amputation ; TIA  who initially presented to [facility] on 4/2/25 after falling a few days prior onto a rail but had progressive pain to lower mid back. Of note he was recently admitted to the hospital after a fall where he was diagnosed with an L2 fracture. He reported multiple recent falls with no dizziness, chest pain, lightheadedness. His falls were described as mechanical. He was found to have a large retroperitoneal hematoma on the right and the fore-mentioned L2 fracture. CTA of the abdomen and pelvis showed extravasation of contrast. He was transferred to [facility] on 4/3/25 for further eval / assessment / consultation. During hospital course / stay he went to IR for embolization of his deep circumflex iliac artery and superior gluteal artery. Nephrology was consulted for hemodialysis management. His hemoglobin trended down and he was transfused with a total of 4 PBRCs during hospital course.  He underwent CTA of the abdomen and pelvis which showed a 5 mm pseudoaneurysm of the inferior gluteal artery branch.  He underwent embolization of the pseudoaneurysm.  He was started on subcu heparin for DVT prophylaxis and his hemoglobin closely monitored with stable hemoglobin send DVT prophylaxis he was transition to his home Eliquis dosing.  His hemoglobin remained stable on Eliquis. He is WBAT to all extremities. Pain  has been managed with PO and IV PRN analgesics. PT/OT therapies were consulted and continue to follow patient at this time and are recommending inpatient acute rehab when medically stable. All involved medical disciplines feel/agree patient is medically ready for discharge at this time. Inpatient acute rehabilitation physician was consulted. Upon review of patient's case and correspondence with PT/OT therapy services, [facility] physician feels Naresh will benefit and is a good candidate / appropriate for inpatient acute rehab at this time. He has demonstrated the willingness / desire and tolerance to participate in the required 3 hours or more of therapies per day.     This week we will encourage independence with ADLs.  We will monitor labs and vital signs.  We will educate pt/family about repositioning to prevent skin breakdown.  We will assist w repositioning and perform routine skin checks.  We will monitor for adequate pain control.  We will monitor for constipation and medicate pt as ordered.  We will increase safety awareness and keep pt free from falls.    4/17: Pt is alarmed for safety. Sleeps in recliner. DW. Sleep log. Severely visually impaired. Pt keeps room dark d/t lights being painful/irritating. L2 vertrebral body fracture and retroperitoneal hematoma. LSO brace - can be non-compliant at times when sitting in chair. Lumbar precautions. Pain is being managed by scheduled tylenol, gabapentin. Robaxin and PRN lidocaine patches and oxycodone. Pt does look somnolent. HD T, Th, Sat. R permacath. LFA IV due 4/17. Continent of bowel and bladder. Mostly anuric with occasional voids. Last bowel movement was 4/13. 1200 mL fluid restriction for hyponatremia. L foot wound gets NSS, dermagran, and mepilex changed QOD due 4/17. L great & 2nd toe gets painted with betadine daily. Moisture barrier to sacrum. F/U XRAY of L humerus showed unchanged alignment. Ortho saw pt on 4/14 - pt is now WBAT. Plan to follow up with  ortho in 4 weeks. Min Ax1 with RW.     Case Management:     Discharge Planning  Living Arrangements: Lives Alone  Support Systems: Family members  Assistance Needed: may need at D/C  Type of Current Residence: Private residence  Current Home Care Services: No  Pt admitted s/p retroperitoneal hematoma and received dialysis. Pt has been non compliant with dialysis treatments, and has vision deficits. Pt lives alone and has a supportive sister but he is not able to reside with her. Pt aware of options for dc and has been encouraged to return to his sisters apmt. Transportation options are going to need to be investigated for pts doctor appmts and dialysis. Pt does not have a secondary insurance.     Is the patient actively participating in therapies? yes  List any modifications to the treatment plan:     Barriers Interventions   Lives alone, depressive mood, motivation Encourage participation to achieve goals to dc to home.    Vision deficits, lack of transportation Investigate transportation options if family unable to assist, compensatory strategies   Left foot wound Benadine and alevyn applied by nursing, education to be provided to pt vs [facility] services   Le weakness balance Therapy ex, endurance training, use of walker         Is the patient making expected progress toward goals? yes  List any update or changes to goals:     Medical Goals: Patient will be medically stable for discharge to [facility] upon completion of rehab program and Patient will be able to manage medical conditions and comorbid conditions with medications and follow up upon completion of rehab program    Weekly Team Goals:   Rehab Team Goals  ADL Team Goal: Patient will be independent with ADLs with least restrictive device upon completion of rehab program  Transfer Team Goal: Patient will be independent with transfers with least restrictive device upon completion of rehab program  Locomotion Team Goal: Patient will be  independent with locomotion with least restrictive device upon completion of rehab program    Discussion: pt presents with the above barriers and is making good progress. Pts mood is a barrier in that is not forth coming with information to aid in dc planning. Pt has an lso brace and lumbar precautions. OT is trialing lhae equipment and bracing education. Bright orange tape has been applied to aids to help with visual impairments. Pt is unable to sort meds or read labels, blister packs to be recommended for dc with investigation into pts pharmacy. Family education is needed on the level of support. Transportation options to be investigated. Pt is currently close supervision for transfers walking and stairs with a roller walker. Overall adls min to contact guard. Recommendations are for contd c for rn pt and ot,     Anticipated Discharge Date:  4/23/25  [facility] Team Members Present:The following team members are supervising care for this patient and were present during this Weekly Team Conference.    Physician: Dr. Freida DO  : Patti Nuno MSW  Registered Nurse: Olga Romero RN  Physical Therapist: Niharika Maagllanes DPT  Occupational Therapist: Simone Esparza MS, OTR/L  Speech Therapist:

## 2025-04-17 NOTE — PROGRESS NOTES
Progress Note - PMR   Name: Naresh Carpio 64 y.o. male I MRN: [id number]  Unit/Bed#: -01 I Date of Admission: 4/11/2025   Date of Service: 4/17/2025 I Hospital Day: 6     Assessment & Plan  Traumatic retroperitoneal hematoma  - Sustained from mechanical fall several days prior to initial presentation on 4/2/25, presenting with progressively worsening low back pain. Trauma imaging revealed a large retroperitoneal hematoma with active extravasation and hemoglobin drop. Now s/p transfusion, Kcentra and ultimately IR embolization of the right deep circumflex iliac artery and superior gluteal artery with Dr. Garcia on 4/3/25. Unfortunately, on 4/6/25, his hemoglobin decreased and repeat CT showed a 5 mm inferior gluteal artery branch pseudoaneurysm and he is s/p IR pelvic angiogram on 4/7/25 with Dr. Hernandez, which demonstrated pseudoaneurysms at the distal branches of the left inferior gluteal artery which was embolized.   > 4/15: Hgb 7.2 today (from 7.6 yesterday). Back pain seems controlled. Will be receiving increased Epogen today per IM/Nephro    - Continue to trend Hgb on CBC closely  - Consider repeat imaging if Hgb continues to downtrend or pain significantly worsens  - Contact IR if there are additional bleeding concerns given multiple prior interventions described above  - Local right hip/groin access site incision care  - Analgesia, see below  - PT and OT  Acute pain due to trauma  - Evaluated by APS on acute care  - Tylenol 975 mg q8h scheduled  - Gabapentin 100 mg three times weekly  - Methocarbamol 750 mg q8h scheduled  - Lidocaine patches daily PRN  - Oxycodone 5 or 10 mg q4h PRN, wean as possible  Impaired mobility and activities of daily living  - Rehabilitation medicine physician for daily monitoring of care, 24 hour availability for acute medical issues, medication management, and therapeutic and diagnostic assessments.  - 24 hour rehabilitation nursing 7 days per week for: management/teaching of  medications, bowel/bladder routine, skin care.  - PT, OT for 2-3 hours per day, 5 to 6 days per week; 15 hours per week  - Rehabilitation Psychology as needed for adjustment and coping  - CM for barriers to discharge, community resources, and family support  - Discharge planning following to help ensure a safe and efficient discharge  - 900/7 program due to HD status, pain, expected therapy tolerance    L2 vertebral fracture (HCC)  > Sustained from a mechanical fall on 3/24/25, found to have an acute, obliquely oriented fracture of the L2 vertebral body with distraction of the dominant fracture fragment and involvement of the anterior and posterior cortices and superior endplate  > At that time, managed non-operatively with LSO bracing, though it does not appear orthopedic spine surgery or neurosurgery evaluated at the time of injury    - Maintain LSO brace with lumbar spinal precautions   - Should follow-up with spine surgery outpatient  Closed fracture of proximal end of left humerus with routine healing  - Sustained from fall in early February, per CT of the LUE on 2/9/25, there was a minimally displaced fracture of the anterior facet of the greater tuberosity of the left humerus, minimally apparent on repeat x-ray of the shoulder on 3/24/25  - He was evaluated by orthopedic surgery at the time of the initial injury and was recommended to maintain NWB through the LUE with a sling  - There is no further orthopedic evaluation documentation and it does not appear that further follow-up occurred.  - Clinically, he is not describing ongoing significant left arm or shoulder pain and has been weight-bearing through the LUE  > 4/14: Repeat humerus X-ray done yesterday shows no change in fracture alignment. Ortho consulted.  >4/15: Ortho c/s: May WBAT to LUE. PT/OT to increase strength and ROM. F/u with Dr. Cisneros in 4 weeks    - Monitor  - PT/OT  Insomnia  Patient reports he has an overall a poor sleep hygiene: he  "sleeps at 1-2am nightly and watches TV up until bed.  Advised patient to attempt to regulate sleep/wake cycle while here to better participate in therapy. Attempt to try to sleep earlier around midnight  >4/15: reviewed sleep log: Pt went to sleep around midnight and slept for 6 hours.  > 4/17- Poor sleep last night: was awake every other hour. Requiring nap during the day. Can increase melatonin tomorrow if this persists    - Continue sleep log  - Continue melatonin 6mg nightly  At risk for constipation  > Last BM 4/13   4/15- Adjusted bowel medications: Colace 100mg BID, Senna 17.2mg with lunch and PRN Miralax/Suppos  4/17- No BM since 4/13. Lactulose this evening if no BM after lunch. Can consider Relistor if no success for opioid-inducted constipation    - Will follow BMs and adjust bowel regimen to target soft, formed stools q1-2 days, per pt's regular schedule.  Anemia due to chronic kidney disease, on chronic dialysis (HCC)  > 4/14: Hgb 7.6  > 4/15: Hgb 7.2 - Discussed with IM who is coordinating with Nephrology; they will increase his Epoetin jessica    - Trend Hgb on CBC  - Epoetin jessica with dialysis  - Transfusions per IM, appreciate their recommendations  Visual disturbance  - Reporting \"floater\" within the right eye with acute onset on 4/11/25 at mid-day while watching TV; describes a dark, \"spider\"-shaped floater that occupies a significant portion of the visual field, painless; has similar floaters in the left eye but smaller  - Ophthalmology consulted on [facility] admission by IM, per prior trauma surgery documentation, Dr. Chaudhry of ophthalmology was made personally aware of clinical situation on 4/11/25 prior to [facility] admission  > Ophthalmology consult 4/12: Exam c/w proliferative diabetic retinopathy with vitreous hemorrhage. Pt will follow up with ophtho immediately after discharge and for referral to a retina specialist  > 4/15- Pt thinks his R eye floater may be larger today, unable to clearly state or " "quantify symptoms. No other visual complaints of increased number of floaters, double/blurry vision, or bright flashes. Advised patient to inform us if symptoms change - will reach out to Ophthalmology accordingly    - Monitor for changes in vision  Diabetic ulcer of left midfoot associated with type 2 diabetes mellitus, limited to breakdown of skin (HCC)  - Has chronic wounds affecting the left foot plantar surface, left foot 1st and 2nd digits  - For left medial foot, left plantar surface: cleanse with NSS and pat dry. Apply dermagran to plantar wound only. Cover both wounds with silicone bordered foam dressings. Naren with T for Treatment and change every other day or PRN  - Pain left foot 1st and 2nd digits with betadine daily  - See \"at risk for skin breakdown\" for preventive care  - Should follow-up closely with podiatry in the outpatient setting after [facility] discharge, last outpatient documentation appears to be from 2023  Hyponatremia  > 4/14: Na 129 - A&O x3, pt mentating appropriately  > 4/15: Na 125 - Discussed with IM who is coordinating with Nephrology; they will manage Na during HD     - Trend on BMP  - Electrolyte management per nephrology  - Continue HD in setting of ESRD  ESRD (end stage renal disease) (HCC)  - Continue HD  - Nephrology consulted and following, appreciate their recommendations  Paroxysmal atrial fibrillation (HCC)  - Currently rate controlled  - Embolic stroke risk reduction with apixaban 5 mg BID  Chronic diastolic (congestive) heart failure (HCC)  Wt Readings from Last 3 Encounters:   04/17/25 97 kg (213 lb 12.8 oz)   04/11/25 98.5 kg (217 lb 2.5 oz)   03/24/25 107 kg (234 lb 12.6 oz)     - Most recent TTE from 10/9/24 with EF 60-65%, grade 1 diastolic dysfunction  - Volume management with HD per nephrology  - Monitor weights  - Appreciate medicine and nephrology recommendations and management  Primary hypertension  - Nifedipine  - Appreciate IM and nephrology management  Gout  - " Allopurinol for flare prophylaxis  Hyperphosphatemia  - Management per nephrology  - Continue phosphate binders  Wound of skin  - Has right anterior pre-tibial scab, wash gently daily, leave open to air  - Wound care RN consulted and following  At high risk for skin breakdown  - Moisturize skin daily with skin nourishing cream  - Ehob cushion in chair when out of bed.  - Preventative hydraguard to bilateral heels BID and PRN.   - Calazime paste to sacrum/buttocks BID and PRN  - Monitor clinically for breakdown, frequent turns  - Wound care RN consulted and following  At risk for venous thromboembolism (VTE)  - Apixaban, see above    Subjective   64-year-old with past medical history of acute on chronic anemia, atrial fibrillation, 5 cigarettes, diabetes, end-stage renal disease on dialysis presenting on 4/2 with progressive worsening low back pain after falling and having a large left-sided retroperitoneal hematoma with active extravasation initially treated with Kcentra and stabilized with transfusion and transferred to [facility] for IR evaluation embolization and completed on 4/they will continue to have decreasing hemoglobin found to have a 5 mm inferior gluteal artery branch pseudoaneurysm and did require an additional IR pelvic angiogram with ambulation Dacian on 4/7. Course complicated by acute pain     Chief Complaint: f/u ambulatory dysfunction    Interval: Patient seen and examined. No acute overnight events. Patient very tired this morning and is trying to take a nap before HD. Sleep log reviewed: was awake every other hour last night. Otherwise, denies any acute complaints. Last BM 4/17- pt agreeable to stronger PO meds later in the day if no BM after lunch. No new labs to review today yet.      Objective :  Temp:  [97.3 °F (36.3 °C)-98.3 °F (36.8 °C)] 97.5 °F (36.4 °C)  HR:  [54-61] 54  BP: (119-150)/(43-66) 150/66  Resp:  [16-17] 16  SpO2:  [96 %-98 %] 98 %  O2 Device: None (Room  air)    Functional Update:  Participated in interdisciplinary team meeting today. Barriers to discharge include his overall mood with DC planning and ongoing poor vision issues. Family education is needed. DC date set at 4/23.  Mobility: Set up- Sup for bed mobility.  Sup to CS ambulation (175') with RW  Transfers: Sup to CS for transfers  with RW  ADLs: Ind eating, Rin bathing/UBD, mod-maxA LBD, totalA footwear    Physical Exam  Vitals reviewed.   Constitutional:       Appearance: Normal appearance.   HENT:      Head: Normocephalic and atraumatic.      Right Ear: External ear normal.      Left Ear: External ear normal.      Nose: Nose normal.   Eyes:      Conjunctiva/sclera: Conjunctivae normal.   Cardiovascular:      Rate and Rhythm: Normal rate and regular rhythm.      Heart sounds: Normal heart sounds.   Pulmonary:      Effort: Pulmonary effort is normal.      Breath sounds: Normal breath sounds.   Abdominal:      General: There is no distension.      Palpations: Abdomen is soft.      Comments: Hypoactive BS   Musculoskeletal:      Comments: +LSO   Skin:     General: Skin is warm and dry.   Neurological:      Mental Status: He is alert and oriented to person, place, and time. Mental status is at baseline.       Scheduled Meds:  Current Facility-Administered Medications   Medication Dose Route Frequency Provider Last Rate    acetaminophen  975 mg Oral Q8H KEMAL Damian Cerda MD      allopurinol  100 mg Oral Daily Damian Cerda MD      apixaban  5 mg Oral BID Damian Cerda MD      atorvastatin  80 mg Oral Daily With Dinner Damian Cerda MD      bisacodyl  10 mg Rectal Daily PRN Michelle T Landa, DO      docusate sodium  100 mg Oral BID Michelle T Landa, DO      epoetin jessica  8,000 Units Intravenous After Dialysis German Linton MD      gabapentin  100 mg Oral Once per day on Monday Wednesday Friday Damian Cerda MD      lidocaine  1 patch Topical Daily PRN Damian Cerda MD      melatonin  6 mg Oral HS Jayden DUEÑAS  MD Kashif      methocarbamol  1,000 mg Oral Q8H [facility] Jayden Rowland MD      naloxone  0.04 mg Intravenous Q1MIN PRN Damian Cerda MD      NIFEdipine  30 mg Oral After Dinner Damian Cerda MD      oxyCODONE  5 mg Oral Q4H PRN Damian Cerda MD      Or    oxyCODONE  10 mg Oral Q4H PRN Damian Cerda MD      oxyCODONE  2.5 mg Oral Q8H PRN Jayden Rowland MD      polyethylene glycol  17 g Oral Daily PRN Michelle Landa DO      senna  2 tablet Oral Daily With Lunch Michelle Landa DO      sevelamer  1,600 mg Oral TID With Meals Damian Cerda MD      white petrolatum-mineral oil   Topical TID PRN Michelle Landa DO           Lab Results: I have reviewed the following results:  Results from last 7 days   Lab Units 04/15/25  0428 04/14/25  0611 04/13/25  1307 04/11/25  0508   HEMOGLOBIN g/dL 7.2* 7.6* 8.0* 7.4*   HEMATOCRIT % 22.8* 24.7* 25.4* 25.0*   WBC Thousand/uL 8.35 9.11  --  7.55   PLATELETS Thousands/uL 318 336  --  378     Results from last 7 days   Lab Units 04/15/25  0447 04/14/25  0611 04/11/25  0508   BUN mg/dL 55* 39* 30*   SODIUM mmol/L 125* 129* 133*   POTASSIUM mmol/L 4.7 4.7 4.4   CHLORIDE mmol/L 89* 93* 96   CREATININE mg/dL 7.54* 6.16* 4.63*

## 2025-04-17 NOTE — ASSESSMENT & PLAN NOTE
"- Reporting \"floater\" within the right eye with acute onset on 4/11/25 at mid-day while watching TV; describes a dark, \"spider\"-shaped floater that occupies a significant portion of the visual field, painless; has similar floaters in the left eye but smaller  - Ophthalmology consulted on [facility] admission by IM, per prior trauma surgery documentation, Dr. Chaudhry of ophthalmology was made personally aware of clinical situation on 4/11/25 prior to [facility] admission  > Ophthalmology consult 4/12: Exam c/w proliferative diabetic retinopathy with vitreous hemorrhage. Pt will follow up with ophtho immediately after discharge and for referral to a retina specialist  > 4/15- Pt thinks his R eye floater may be larger today, unable to clearly state or quantify symptoms. No other visual complaints of increased number of floaters, double/blurry vision, or bright flashes. Advised patient to inform us if symptoms change - will reach out to Ophthalmology accordingly    - Monitor for changes in vision  "

## 2025-04-17 NOTE — CASE MANAGEMENT
Cm unable to meet w/pt following team meeting as he was already in dialysis. Cm to follow up tomorrow.

## 2025-04-17 NOTE — ASSESSMENT & PLAN NOTE
- Status post IR embolization  - Management per primary team  - Monitor hemoglobin, if drops further may need further evaluation with CT scan

## 2025-04-17 NOTE — ASSESSMENT & PLAN NOTE
Continue local care  Being followed by WC  Last LEADS was 3/28/25 = 50-75% right prox polital and prox tibioperoneal trunk; >75% stenosis in prox popliteal artery on left  Was seen by VS in 12/2024 hospital stay and he declined any intervention at the time

## 2025-04-17 NOTE — ASSESSMENT & PLAN NOTE
> Last BM 4/13   4/15- Adjusted bowel medications: Colace 100mg BID, Senna 17.2mg with lunch and PRN Miralax/Suppos  4/17- No BM since 4/13. Lactulose this evening if no BM after lunch. Can consider Relistor if no success for opioid-inducted constipation    - Will follow BMs and adjust bowel regimen to target soft, formed stools q1-2 days, per pt's regular schedule.

## 2025-04-17 NOTE — ASSESSMENT & PLAN NOTE
Wt Readings from Last 3 Encounters:   04/17/25 97 kg (213 lb 12.8 oz)   04/11/25 98.5 kg (217 lb 2.5 oz)   03/24/25 107 kg (234 lb 12.6 oz)     - Most recent TTE from 10/9/24 with EF 60-65%, grade 1 diastolic dysfunction  - Volume management with HD per nephrology  - Monitor weights  - Appreciate medicine and nephrology recommendations and management

## 2025-04-17 NOTE — ASSESSMENT & PLAN NOTE
Had 5 U PRBCs during hospital stay  Hemoglobin has been running mostly 7.3-7.4.    On 4/14/25 was 7.6; on 4/15/24 was 7.2 ---> Renal increased his Epogen today in light of his hemoglobin  Today 4/17, hemoglobin has increased to 7.7

## 2025-04-17 NOTE — ASSESSMENT & PLAN NOTE
- This is due to lack of free water clearance and probably excessive fluid intake  - Sodium level 125 on 04/15  - Sodium level today pending  - Continue fluid restriction 1200 cc per 24 hours  - 138 mEq sodium bath on dialysis today  - Continue ultrafiltration as tolerated on HD

## 2025-04-17 NOTE — PROGRESS NOTES
NEPHROLOGY HOSPITAL PROGRESS NOTE   Naresh Carpio 64 y.o. male MRN: [id number]  Unit/Bed#: -01 Encounter: [id number]  Reason for Consult: ESRD on HD  Assessment & Plan  ESRD (end stage renal disease) (HCC)  - TTS at [facility]  - Access: Right IJ permacath  - EDW 99 kg, last HD postweight 97.2 kg  - HD today  Primary hypertension  - Blood pressure acceptable  - Continue nifedipine 30 mg daily  Anemia due to chronic kidney disease, on chronic dialysis (HCC)  - Hemoglobin 7.2 on 04/15  - Hemoglobin today pending  - Continue SURINDER with dialysis (increased to 8000 units this week)  - He was previously not on SURINDER  - Transfuse to keep more than 7  Hyperphosphatemia  - Phosphorus level 4.6 on 04/15  - Continue sevelamer with meals  Chronic diastolic (congestive) heart failure (HCC)  - Ultrafiltration as tolerated on HD  Traumatic retroperitoneal hematoma  - Status post IR embolization  - Management per primary team  - Monitor hemoglobin, if drops further may need further evaluation with CT scan  Paroxysmal atrial fibrillation (HCC)  - Management per primary team  Diabetic ulcer of left midfoot associated with type 2 diabetes mellitus, limited to breakdown of skin (HCC)  - Management per primary team  Hyponatremia  - This is due to lack of free water clearance and probably excessive fluid intake  - Sodium level 125 on 04/15  - Sodium level today pending  - Continue fluid restriction 1200 cc per 24 hours  - 138 mEq sodium bath on dialysis today  - Continue ultrafiltration as tolerated on HD      I have reviewed the nephrology recommendations including follow-up hemoglobin today and if further decline, may need to consider CT abdomen to rule out worsening perirenal hematoma, with internal medicine team, and we are in agreement with renal plan including the information outlined above.    SUBJECTIVE / 24H INTERVAL HISTORY:  Continues to have leg swelling but better.  Denies dyspnea.  Denies lightheadedness or  dizziness.    OBJECTIVE:  Current Weight: Weight - Scale: 97 kg (213 lb 12.8 oz)  Vitals:    04/16/25 1421 04/16/25 2015 04/17/25 0517 04/17/25 0525   BP: (!) 119/43 119/56  150/66   BP Location: Right arm Left arm  Left arm   Pulse: 61 60  (!) 54   Resp: 17 17 16   Temp: 98.3 °F (36.8 °C) (!) 97.3 °F (36.3 °C)  97.5 °F (36.4 °C)   TempSrc: Oral Oral  Oral   SpO2: 96% 98%     Weight:   97 kg (213 lb 12.8 oz)    Height:           Intake/Output Summary (Last 24 hours) at 4/17/2025 0821  Last data filed at 4/17/2025 0517  Gross per 24 hour   Intake 600 ml   Output 150 ml   Net 450 ml     Review of Systems   Constitutional:  Negative for chills and fever.   HENT:  Negative for ear pain and sore throat.    Eyes:  Negative for pain and visual disturbance.   Respiratory:  Negative for cough and shortness of breath.    Cardiovascular:  Positive for leg swelling. Negative for chest pain and palpitations.   Gastrointestinal:  Negative for abdominal pain and vomiting.   Genitourinary:  Negative for dysuria and hematuria.   Musculoskeletal:  Negative for arthralgias and back pain.   Skin:  Negative for color change and rash.   Neurological:  Negative for seizures and syncope.   All other systems reviewed and are negative.    Physical Exam  Vitals and nursing note reviewed.   Constitutional:       General: He is not in acute distress.     Appearance: He is well-developed.   HENT:      Head: Normocephalic and atraumatic.   Eyes:      Conjunctiva/sclera: Conjunctivae normal.   Cardiovascular:      Rate and Rhythm: Normal rate and regular rhythm.      Heart sounds: No murmur heard.     Comments: Right chest wall permacath present  Pulmonary:      Effort: Pulmonary effort is normal. No respiratory distress.      Breath sounds: Normal breath sounds.   Abdominal:      Palpations: Abdomen is soft.      Tenderness: There is no abdominal tenderness.   Musculoskeletal:         General: No swelling.      Cervical back: Neck supple.       Right lower leg: Edema present.      Left lower leg: Edema present.   Skin:     General: Skin is warm and dry.      Capillary Refill: Capillary refill takes less than 2 seconds.   Neurological:      Mental Status: He is alert.   Psychiatric:         Mood and Affect: Mood normal.         Medications:    Current Facility-Administered Medications:     acetaminophen (TYLENOL) tablet 975 mg, 975 mg, Oral, Q8H KEMAL, Damian Cerda MD, 975 mg at 04/17/25 0517    allopurinol (ZYLOPRIM) tablet 100 mg, 100 mg, Oral, Daily, Damian Cerda MD, 100 mg at 04/16/25 0913    apixaban (ELIQUIS) tablet 5 mg, 5 mg, Oral, BID, Damian Cerda MD, 5 mg at 04/16/25 1754    atorvastatin (LIPITOR) tablet 80 mg, 80 mg, Oral, Daily With Dinner, Damian Cerda MD, 80 mg at 04/16/25 1754    bisacodyl (DULCOLAX) rectal suppository 10 mg, 10 mg, Rectal, Daily PRN, Michelle ABURTO Landa, DO    docusate sodium (COLACE) capsule 100 mg, 100 mg, Oral, BID, Michelle T Landa, DO, 100 mg at 04/16/25 1754    epoetin jessica (EPOGEN,PROCRIT) injection 8,000 Units, 8,000 Units, Intravenous, After Dialysis, German Linton MD, 8,000 Units at 04/15/25 1528    gabapentin (NEURONTIN) capsule 100 mg, 100 mg, Oral, Once per day on Monday Wednesday Friday, Damian Cerda MD, 100 mg at 04/16/25 0913    lidocaine (LIDODERM) 5 % patch 1 patch, 1 patch, Topical, Daily PRN, Damian Cerda MD, 1 patch at 04/13/25 2046    melatonin tablet 6 mg, 6 mg, Oral, HS, Jayden Rowland MD, 6 mg at 04/16/25 2125    methocarbamol (ROBAXIN) tablet 1,000 mg, 1,000 mg, Oral, Q8H KEMAL, Jayden Rowland MD, 1,000 mg at 04/17/25 0517    naloxone (NARCAN) 0.04 mg/mL syringe 0.04 mg, 0.04 mg, Intravenous, Q1MIN PRN, Damian Cerda MD    NIFEdipine (PROCARDIA XL) 24 hr tablet 30 mg, 30 mg, Oral, After Dinner, Damian Cerda MD, 30 mg at 04/16/25 1214    oxyCODONE (ROXICODONE) IR tablet 5 mg, 5 mg, Oral, Q4H PRN **OR** oxyCODONE (ROXICODONE) immediate release tablet 10 mg, 10 mg, Oral, Q4H PRN, Damian  "MD Prashant, 10 mg at 04/17/25 0150    oxyCODONE (ROXICODONE) split tablet 2.5 mg, 2.5 mg, Oral, Q8H PRN, Jayden Rowland MD    polyethylene glycol (MIRALAX) packet 17 g, 17 g, Oral, Daily PRN, Michelle Landa DO    senna (SENOKOT) tablet 17.2 mg, 2 tablet, Oral, Daily With Lunch, Michelle Landa DO, 17.2 mg at 04/16/25 1205    sevelamer (RENAGEL) tablet 1,600 mg, 1,600 mg, Oral, TID With Meals, Damian Cerda MD, 1,600 mg at 04/16/25 1754    white petrolatum-mineral oil (EUCERIN,HYDROCERIN) cream, , Topical, TID PRN, Michelle Landa DO    Laboratory Results:  Results from last 7 days   Lab Units 04/15/25  0447 04/15/25  0428 04/14/25  0611 04/13/25  1307 04/11/25  0508   WBC Thousand/uL  --  8.35 9.11  --  7.55   HEMOGLOBIN g/dL  --  7.2* 7.6* 8.0* 7.4*   HEMATOCRIT %  --  22.8* 24.7* 25.4* 25.0*   PLATELETS Thousands/uL  --  318 336  --  378   POTASSIUM mmol/L 4.7  --  4.7  --  4.4   CHLORIDE mmol/L 89*  --  93*  --  96   CO2 mmol/L 24  --  26  --  28   BUN mg/dL 55*  --  39*  --  30*   CREATININE mg/dL 7.54*  --  6.16*  --  4.63*   CALCIUM mg/dL 8.9  --  9.1  --  9.1   PHOSPHORUS mg/dL 4.6*  --  4.2*  --   --        Portions of the record may have been created with voice recognition software. Occasional wrong word or \"sound a like\" substitutions may have occurred due to the inherent limitations of voice recognition software. Read the chart carefully and recognize, using context, where substitutions have occurred.If you have any questions, please contact the dictating provider.    "

## 2025-04-17 NOTE — ASSESSMENT & PLAN NOTE
- Hemoglobin 7.2 on 04/15  - Hemoglobin today pending  - Continue SURINDER with dialysis (increased to 8000 units this week)  - He was previously not on SURINDER  - Transfuse to keep more than 7

## 2025-04-17 NOTE — PROGRESS NOTES
"Progress Note - Hospitalist   Name: Naresh Carpio 64 y.o. male I MRN: [id number]  Unit/Bed#: -01 I Date of Admission: 4/11/2025   Date of Service: 4/17/2025 I Hospital Day: 6    Assessment & Plan  Traumatic retroperitoneal hematoma  Admitted to the trauma service after a fall with L2 vertebral body and retroperitoneal hematoma.  S/p IR embolization of distal right deep circumflex iliac artery/distal right superior gluteal artery both of which supplied an area of active extravasation and then pseudoaneurysms present at distal branches of the left inferior gluteal artery. The artery was embolized using coils and gelfoam.   Hemoglobin stable on 4/14 and today 4/17 has improved to 7.7  Will continue to watch  Anemia due to chronic kidney disease, on chronic dialysis (HCC)  Had 5 U PRBCs during hospital stay  Hemoglobin has been running mostly 7.3-7.4.    On 4/14/25 was 7.6; on 4/15/24 was 7.2 ---> Renal increased his Epogen today in light of his hemoglobin  Today 4/17, hemoglobin has increased to 7.7  ESRD (end stage renal disease) (Formerly [facility])  Continue Sevelamer TID with meals  Renal following  HD T-TH-Sat while here in rehab  Paroxysmal atrial fibrillation (Formerly [facility])  On no rate control medications  Examines RRR  Anticoagulation with Eliquis as at home  Chronic diastolic (congestive) heart failure (Formerly [facility])  Appears euvolemic except for LE pitting edema which appears to be chronic  Renal diet  stable  Volume management via HD  Primary hypertension  Continue Procardia XL 30 mg qd  Tx per renal  Visual disturbance  Reported a right eye \"floater\" without associated pain earlier in his hospitalization which has resolved but continued to have blurry central vision.  Ophthalmology saw over the weekend = visual acuity without correction is 20/400 OU. Dx: Proliferative diabetic retinopathy with vitreous hemorrhage OU as well as cataracts.   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 2 diabetes mellitus, limited to breakdown of skin (HCC)  Continue local care  Being followed by WC  Last LEADS was 3/28/25 = 50-75% right prox polital and prox tibioperoneal trunk; >75% stenosis in prox popliteal artery on left  Was seen by VS in 12/2024 hospital stay and he declined any intervention at the time  Hyponatremia  Renal following  Tx/adjust with HD  Closed fracture of proximal end of left humerus with routine healing  Occurred from a fall in February and eval'd at the time of the injury  Was to continue NWB/sling and followup with Ortho but there was no followup done  Xray done 4/13/25 = \"Unchanged alignment of the chronic greater tuberosity avulsion fracture\".   Ortho saw here on 4/14 = [facility]; see Dr Cisneros 4 weeks  Gout  Continue allopurinol  L2 vertebral fracture (HCC)  CT CAP 4/2: Fracture of L2 vertebral body extending to the superior endplate  Continue LSO brace  Insomnia  Patient reports not sleeping well at night because he is awake worrying about his medical conditions  Discussed with PMR over the weekend and started melatonin    The above assessment and plan was reviewed and updated as determined by my evaluation of the patient on 4/17/2025.    History of Present Illness   Patient seen and examined. Patients overnight issues or events were reviewed with nursing staff. New or overnight issues include the following:   No new or overnight issues.  Offers no complaints    Review of Systems    Objective :  Temp:  [97.3 °F (36.3 °C)-98.3 °F (36.8 °C)] 97.5 °F (36.4 °C)  HR:  [54-61] 54  BP: (119-150)/(43-66) 150/66  Resp:  [16-17] 16  SpO2:  [96 %-98 %] 98 %  O2 Device: None (Room air)    Invasive Devices       Peripheral Intravenous Line  Duration             Peripheral IV 04/13/25 Left;Ventral (anterior) Forearm 3 days              Hemodialysis Catheter  Duration             HD Permanent Double Catheter 596 days                    Physical Exam  General " Appearance: no distress, nontoxic appearing  HEENT:  External ear normal.  Nose normal w/o drainage. Mucous membranes are moist. Oropharynx is clear. Conjunctiva clear w/o icterus or redness.  Neck:  Supple, normal ROM  Lungs: BBS without crackles/wheeze/rhonchi; respirations unlabored with normal inspiratory/expiratory effort.  No retractions noted.  On RA  CV: regular rate and rhythm; no rubs/murmurs/gallops, PMI normal   ABD: Abdomen is soft.  Bowel sounds all quadrants.  Nontender with no distention.    EXT: + LE edema; left foot dressed  Skin: normal turgor, normal texture  Psych: affect normal, mood normal  Neuro: AAO       The above physical exam was reviewed and updated as determined by my evaluation of the patient on 4/17/2025.      Lab Results: I have reviewed the following results:  Results from last 7 days   Lab Units 04/15/25  0428 04/14/25  0611   WBC Thousand/uL 8.35 9.11   HEMOGLOBIN g/dL 7.2* 7.6*   HEMATOCRIT % 22.8* 24.7*   PLATELETS Thousands/uL 318 336     Results from last 7 days   Lab Units 04/15/25  0447 04/14/25  0611   SODIUM mmol/L 125* 129*   POTASSIUM mmol/L 4.7 4.7   CHLORIDE mmol/L 89* 93*   CO2 mmol/L 24 26   BUN mg/dL 55* 39*   CREATININE mg/dL 7.54* 6.16*   CALCIUM mg/dL 8.9 9.1             Results from last 7 days   Lab Units 04/11/25  0714 04/10/25  1810   POC GLUCOSE mg/dl 93 115       Imaging Results Review: No pertinent imaging studies reviewed.  Other Study Results Review: No additional pertinent studies reviewed.    Review of Scheduled Meds: Medications  reviewed and reconciled as needed  Current Facility-Administered Medications   Medication Dose Route Frequency Provider Last Rate    acetaminophen  975 mg Oral Q8H [facility] Damian Cerda MD      allopurinol  100 mg Oral Daily Damian Cerda MD      apixaban  5 mg Oral BID Damian Cerda MD      atorvastatin  80 mg Oral Daily With Dinner Damian Cerda MD      bisacodyl  10 mg Rectal Daily PRN Michelle Landa DO      docusate  sodium  100 mg Oral BID Michelle T Landa, DO      epoetin jessica  8,000 Units Intravenous After Dialysis German Linton MD      gabapentin  100 mg Oral Once per day on Monday Wednesday Friday Damian Cerda MD      lidocaine  1 patch Topical Daily PRN Damian Cerda MD      melatonin  6 mg Oral HS Jayden Rowland MD      methocarbamol  1,000 mg Oral Q8H KEMAL Jayden Rowland MD      naloxone  0.04 mg Intravenous Q1MIN PRN Damian Cerda MD      NIFEdipine  30 mg Oral After Dinner Damian Cerda MD      oxyCODONE  5 mg Oral Q4H PRN Damian Cerda MD      Or    oxyCODONE  10 mg Oral Q4H PRN Damian Cerda MD      oxyCODONE  2.5 mg Oral Q8H PRN Jayden Rowland MD      polyethylene glycol  17 g Oral Daily PRN Michelle Landa, DO      senna  2 tablet Oral Daily With Lunch Michelle Landa, DO      sevelamer  1,600 mg Oral TID With Meals Damian Cerda MD      white petrolatum-mineral oil   Topical TID PRN Michelle Landa, DO         VTE Pharmacologic Prophylaxis: Eliquis  Code Status: Level 1 - Full Code  Current Length of Stay: 6 day(s)    Administrative Statements     ** Please Note:  voice to text software may have been used in the creation of this document. Although proof errors in transcription or interpretation are a potential of such software**

## 2025-04-17 NOTE — PROGRESS NOTES
04/17/25 1000   Pain Assessment   Pain Assessment Tool 0-10   Pain Score 4   Pain Location/Orientation Location: Back   Restrictions/Precautions   Precautions Bed/chair alarms;Cognitive;Fall Risk;Impulsive;Supervision on toilet/commode;Pain;Spinal precautions;Visual deficit   Weight Bearing Restrictions No   RUE Weight Bearing Per Order WBAT   LUE Weight Bearing Per Order WBAT   RLE Weight Bearing Per Order WBAT   LLE Weight Bearing Per Order WBAT   ROM Restrictions Yes  (spinal)   Braces or Orthoses LSO  (OOB)   Cognition   Overall Cognitive Status Impaired   Arousal/Participation Alert   Attention Attends with cues to redirect   Orientation Level Oriented X4   Memory Decreased recall of precautions   Following Commands Follows multistep commands with increased time or repetition   Subjective   Subjective Pt tired but agreeable to PT   Sit to Stand   Type of Assistance Needed Supervision   Physical Assistance Level No physical assistance   Comment  RW   Sit to Stand CARE Score 4   Bed-Chair Transfer   Type of Assistance Needed Supervision   Physical Assistance Level No physical assistance   Comment  RW   Chair/Bed-to-Chair Transfer CARE Score 4   Walk 10 Feet   Type of Assistance Needed Supervision;Adaptive equipment   Physical Assistance Level No physical assistance   Comment RW   Walk 10 Feet CARE Score 4   Walk 50 Feet with Two Turns   Type of Assistance Needed Supervision;Adaptive equipment   Physical Assistance Level No physical assistance   Comment RW   Walk 50 Feet with Two Turns CARE Score 4   Walk 150 Feet   Type of Assistance Needed Supervision;Adaptive equipment   Physical Assistance Level No physical assistance   Comment RW   Walk 150 Feet CARE Score 4   Ambulation   Primary Mode of Locomotion Prior to Admission Walk   Distance Walked (feet) 225 ft  (x2, 150 x 2)   Assist Device Roller Walker   Gait Pattern Slow Lisa;Step through;Forward Flexion;Wide GARO   Limitations Noted In Balance;Heel  Strike;Posture   Provided Assistance with: Balance;Direction   Walk Assist Level Supervision   Findings Needs VCs to remain closer to RW, wide GARO noted   Does the patient walk? 2. Yes   Curb or Single Stair   Style negotiated Single stair   Type of Assistance Needed Supervision   Physical Assistance Level No physical assistance   1 Step (Curb) CARE Score 4   4 Steps   Type of Assistance Needed Supervision   Physical Assistance Level No physical assistance   4 Steps CARE Score 4   Stairs   Type Stairs   # of Steps 4   Assist Devices Bilateral Rail   Therapeutic Interventions   Other side stepping BUE on HR 60 ft x2, side stepping over quad canes 30 ft x4, BLE hip abduction 2 x10, backwards walking 1 UE on hand rail 50 ft x2   Assessment   Treatment Assessment Edward seen for 90 min skilled PT session with focus on gait training with RW pt progressing with longer ambulation distances and tolerated initiation of balance and standing ther ex. Pt continues to be limited by back pain and fatigue but making progress with functional mobility. Pt continues to be limited by report visual impairments but able to negotiate in hallway during ambulation without hitting any obstacles. Continue to focus on gait training and continued balance training to maximize independence.   Problem List Decreased endurance;Impaired balance;Decreased mobility;Decreased safety awareness;Pain   Barriers to Discharge Decreased caregiver support;Inaccessible home environment   PT Barriers   Functional Limitation Car transfers;Transfers;Standing;Stair negotiation;Walking   Plan   Treatment/Interventions ADL retraining;LE strengthening/ROM;Functional transfer training;Elevations;Therapeutic exercise;Endurance training;Patient/family training;Equipment eval/education;Bed mobility;Gait training   Progress Progressing toward goals   PT Therapy Minutes   PT Time In 1000   PT Time Out 1130   PT Total Time (minutes) 90   PT Mode of treatment - Individual  (minutes) 90   PT Mode of treatment - Concurrent (minutes) 0   PT Mode of treatment - Group (minutes) 0   PT Mode of treatment - Co-treat (minutes) 0   PT Mode of Treatment - Total time(minutes) 90 minutes   PT Cumulative Minutes 420   Therapy Time missed   Time missed? No

## 2025-04-17 NOTE — ASSESSMENT & PLAN NOTE
Admitted to the trauma service after a fall with L2 vertebral body and retroperitoneal hematoma.  S/p IR embolization of distal right deep circumflex iliac artery/distal right superior gluteal artery both of which supplied an area of active extravasation and then pseudoaneurysms present at distal branches of the left inferior gluteal artery. The artery was embolized using coils and gelfoam.   Hemoglobin stable on 4/14 and today 4/17 has improved to 7.7  Will continue to watch

## 2025-04-17 NOTE — ASSESSMENT & PLAN NOTE
Patient reports he has an overall a poor sleep hygiene: he sleeps at 1-2am nightly and watches TV up until bed.  Advised patient to attempt to regulate sleep/wake cycle while here to better participate in therapy. Attempt to try to sleep earlier around midnight  >4/15: reviewed sleep log: Pt went to sleep around midnight and slept for 6 hours.  > 4/17- Poor sleep last night: was awake every other hour. Requiring nap during the day. Can increase melatonin tomorrow if this persists    - Continue sleep log  - Continue melatonin 6mg nightly

## 2025-04-17 NOTE — PCC PHYSICAL THERAPY
4/30/25: Ed has improved in his overall LE strength, endurance and standing/ambulatory balance which is demonstrated in his continued mod I with RW for ambulation, S for stair negotiation and mod I with RW for in room mobility. Family meeting took place today with pt, pt sister Diana and her significant other to discuss progress, current plan for DC and to show them his CLOF. Verbalized and demonstrated that patient is doing well with his functional mobility and is at the point that he is able to be DC home pending CM finding dialysis transport. PT POC currently focusing on  FF steps and NAZARIO with no HR, with goals to progress to Prabhjot before discharge- continue to practice this in future therapy sessions. Current PT POC to continue with interventions to address inc balance and righting reactions B LE strength and cardiovascular endurance with LE TE and repetitive functional task practice. Continue to review IADL task practice and offer additional practice with tasks that pt previously declined as after family meeting pt may be more willing to participate in activities not previously done.     4/23/25: Ed continues to have impairments in LE strength, standing balance, and cardiovascular endurance. He is also limited by his back pain, fatigue from dialysis, dec vision (pt reports L eye blurry, R eye blurry and floater), B LE wounds and coccyx wound. He has barriers to home that include NAZARIO, dec caregiver support for IADLs. To address his current impairments and barriers, interventions have included LE TE, pain management (LE TE/flexibility, repositioning, STS for pressure relief), endurance training through increasing ambulation distances/Nustep, and to address current balance deficits pt is utilizing RW for ambulation/transfers. Additionally, repetitive task practice for transfers and stair negotiation has been done and pt has demonstrated improvement as he only requires (S), however he is still limited by his fatigue  ED MD at the bedside for eval.    and with continued dialysis this may be a limitation. He has been able to manage his LSO Brace. Team is currently trying to identify possible solutions for pt to be able to get to/from dialysis so he can be d/c home. Will begin to practice more IADLs with pt to improve in home mobility and dec fall risk.Current PT POC is to coordinate with OT to progress pt to Prabhjot in Room with RW and to be (I) with LSO management.     Naresh presents to [facility] s/p a fall at home with an L2 fracture. Medically he has DM, diabetic neuropathy and decreased vision, and is on HD. He has increased back pain and use of LSO for L2 fracture. He has been living alone in an apartment. Biggest barriers at time of eval were increased back pain and limited ability to participate in therapy as a result. Additional barriers to home have been NAZARIO, dec caregiver availability and recent decline in vision that results in dec balance, dec ability to perform IADLs. Interventions have been therex, endurance training, use of RW, and functional task practice,. Since eval he has been able to progress to CS for functional mobility with RW as of 4/16, however this is for basic transfers and walking. Due to having limited caregiver availability, he will need to manage RW up/down NAZARIO as well as all IADLs in the apartment (cooking, laundry, cleaning). As a team we are looking into resources for him to have transportation to HD and resources like meals on wheels to assist in dec burden of IADLs due to limited overall mobility and vision. PT POC will cont to focus on progressing BLE strength, activity tolerance, managing LSO brace, to progress stairs, walking distance, and (I) with functional mobility with use of RW.

## 2025-04-18 PROCEDURE — 97535 SELF CARE MNGMENT TRAINING: CPT

## 2025-04-18 PROCEDURE — 99232 SBSQ HOSP IP/OBS MODERATE 35: CPT | Performed by: STUDENT IN AN ORGANIZED HEALTH CARE EDUCATION/TRAINING PROGRAM

## 2025-04-18 PROCEDURE — 97530 THERAPEUTIC ACTIVITIES: CPT

## 2025-04-18 PROCEDURE — 99233 SBSQ HOSP IP/OBS HIGH 50: CPT | Performed by: INTERNAL MEDICINE

## 2025-04-18 PROCEDURE — 97116 GAIT TRAINING THERAPY: CPT

## 2025-04-18 PROCEDURE — 99232 SBSQ HOSP IP/OBS MODERATE 35: CPT | Performed by: NURSE PRACTITIONER

## 2025-04-18 RX ADMIN — Medication 6 MG: at 21:35

## 2025-04-18 RX ADMIN — BISACODYL 10 MG: 10 SUPPOSITORY RECTAL at 00:00

## 2025-04-18 RX ADMIN — NIFEDIPINE 30 MG: 30 TABLET, FILM COATED, EXTENDED RELEASE ORAL at 16:30

## 2025-04-18 RX ADMIN — ACETAMINOPHEN 975 MG: 325 TABLET, FILM COATED ORAL at 14:08

## 2025-04-18 RX ADMIN — SEVELAMER HYDROCHLORIDE 1600 MG: 800 TABLET ORAL at 08:05

## 2025-04-18 RX ADMIN — LIDOCAINE 1 PATCH: 700 PATCH TOPICAL at 21:35

## 2025-04-18 RX ADMIN — ACETAMINOPHEN 975 MG: 325 TABLET, FILM COATED ORAL at 05:51

## 2025-04-18 RX ADMIN — METHOCARBAMOL 1000 MG: 500 TABLET ORAL at 21:35

## 2025-04-18 RX ADMIN — APIXABAN 5 MG: 5 TABLET, FILM COATED ORAL at 16:30

## 2025-04-18 RX ADMIN — APIXABAN 5 MG: 5 TABLET, FILM COATED ORAL at 08:04

## 2025-04-18 RX ADMIN — OXYCODONE HYDROCHLORIDE 10 MG: 10 TABLET ORAL at 16:30

## 2025-04-18 RX ADMIN — ACETAMINOPHEN 975 MG: 325 TABLET, FILM COATED ORAL at 21:35

## 2025-04-18 RX ADMIN — ALLOPURINOL 100 MG: 100 TABLET ORAL at 08:04

## 2025-04-18 RX ADMIN — OXYCODONE HYDROCHLORIDE 10 MG: 10 TABLET ORAL at 05:50

## 2025-04-18 RX ADMIN — SEVELAMER HYDROCHLORIDE 1600 MG: 800 TABLET ORAL at 11:28

## 2025-04-18 RX ADMIN — DOCUSATE SODIUM 100 MG: 100 CAPSULE, LIQUID FILLED ORAL at 08:04

## 2025-04-18 RX ADMIN — METHOCARBAMOL 1000 MG: 500 TABLET ORAL at 14:08

## 2025-04-18 RX ADMIN — GABAPENTIN 100 MG: 100 CAPSULE ORAL at 08:04

## 2025-04-18 RX ADMIN — OXYCODONE HYDROCHLORIDE 10 MG: 10 TABLET ORAL at 12:37

## 2025-04-18 RX ADMIN — ATORVASTATIN CALCIUM 80 MG: 80 TABLET, FILM COATED ORAL at 16:30

## 2025-04-18 RX ADMIN — SEVELAMER HYDROCHLORIDE 1600 MG: 800 TABLET ORAL at 16:30

## 2025-04-18 RX ADMIN — SENNOSIDES 17.2 MG: 8.6 TABLET, FILM COATED ORAL at 11:28

## 2025-04-18 RX ADMIN — METHOCARBAMOL 1000 MG: 500 TABLET ORAL at 05:51

## 2025-04-18 RX ADMIN — OXYCODONE HYDROCHLORIDE 10 MG: 10 TABLET ORAL at 21:41

## 2025-04-18 RX ADMIN — DOCUSATE SODIUM 100 MG: 100 CAPSULE, LIQUID FILLED ORAL at 16:30

## 2025-04-18 NOTE — ASSESSMENT & PLAN NOTE
Wt Readings from Last 3 Encounters:   04/18/25 94.9 kg (209 lb 3.2 oz)   04/11/25 98.5 kg (217 lb 2.5 oz)   03/24/25 107 kg (234 lb 12.6 oz)     - Most recent TTE from 10/9/24 with EF 60-65%, grade 1 diastolic dysfunction  - Volume management with HD per nephrology  - Monitor weights  - Appreciate medicine and nephrology recommendations and management

## 2025-04-18 NOTE — ASSESSMENT & PLAN NOTE
Admitted to the trauma service after a fall with L2 vertebral body and retroperitoneal hematoma.  S/p IR embolization of distal right deep circumflex iliac artery/distal right superior gluteal artery both of which supplied an area of active extravasation and then pseudoaneurysms present at distal branches of the left inferior gluteal artery. The artery was embolized using coils and gelfoam.   Hemoglobin stable on 4/14 and on 4/17 had improved to 7.7  Will continue to watch  He was type and screened 4/17/25 in case needed blood during HD on that day.  Will get a CBC with HD on 4/19/25

## 2025-04-18 NOTE — ASSESSMENT & PLAN NOTE
> 4/14: Na 129 - A&O x3, pt mentating appropriately  > 4/15: Na 125 - Discussed with IM who is coordinating with Nephrology; they will manage Na during HD   > 4/17: Na 131    - Trend on BMP  - Electrolyte management per nephrology  - Continue HD in setting of ESRD

## 2025-04-18 NOTE — PROGRESS NOTES
04/18/25 0830   Pain Assessment   Pain Assessment Tool 0-10   Pain Score 5   Pain Location/Orientation Orientation: Lower;Location: Back   Pain Onset/Description Onset: Ongoing;Descriptor: Sore   Patient's Stated Pain Goal No pain   Hospital Pain Intervention(s) Emotional support   Restrictions/Precautions   Precautions Bed/chair alarms;Cognitive;Fall Risk;Pain;Spinal precautions;Supervision on toilet/commode;Visual deficit   ROM Restrictions   (lumbar spinal prec)   Braces or Orthoses LSO;Other (Comment)  (when OOB)   Cognition   Overall Cognitive Status Impaired   Arousal/Participation Alert   Attention Attends with cues to redirect   Orientation Level Oriented X4   Memory Decreased recall of precautions   Following Commands Follows multistep commands with increased time or repetition   Sit to Stand   Type of Assistance Needed Supervision;Adaptive equipment   Comment RW   Sit to Stand CARE Score 4   Bed-Chair Transfer   Type of Assistance Needed Supervision;Adaptive equipment   Comment RW   Chair/Bed-to-Chair Transfer CARE Score 4   Transfer Bed/Chair/Wheelchair   Adaptive Equipment Roller Walker   Walk 10 Feet   Type of Assistance Needed Supervision;Adaptive equipment   Comment RW   Walk 10 Feet CARE Score 4   Walk 50 Feet with Two Turns   Type of Assistance Needed Supervision;Adaptive equipment   Comment RW   Walk 50 Feet with Two Turns CARE Score 4   Ambulation   Primary Mode of Locomotion Prior to Admission Walk   Distance Walked (feet) 75 ft   Assist Device Roller Walker   Gait Pattern Slow Lisa;Decreased foot clearance;Forward Flexion;Wide GARO;Shuffle;Improper weight shift   Limitations Noted In Heel Strike;Posture;Speed;Strength;Swing   Provided Assistance with: Direction   Walk Assist Level Supervision   Does the patient walk? 2. Yes   Therapeutic Interventions   Other BLE ace wrapped start of session.   Other Comments   Comments (S)  Pt educated on spinal precautions as well as wearing LSO when OOB.  Upon entering room pt did not have LSO on. He was able to donnI once educated.   Assessment   Treatment Assessment Brief 30 min session with increased focus on safety education, spinal precautions and gait. Pt found seated in recliner with no LSO on. Pt educated on having it on OOB and educated on spinal precautions (BLT). Pt's main concern throughout session was his eyesight and he perseverated on it throughout session. Pt will cont POC as tolerated with cont focus on gait, stair management, increased safety awareness, increased carryover of new learning and increased LE strengthening to decrease burden of care.   Problem List Decreased endurance;Impaired balance;Decreased mobility;Decreased safety awareness;Pain   Barriers to Discharge Decreased caregiver support;Inaccessible home environment   PT Barriers   Functional Limitation Car transfers;Transfers;Standing;Stair negotiation;Walking   Plan   Treatment/Interventions Functional transfer training;LE strengthening/ROM;Therapeutic exercise;Endurance training;Cognitive reorientation;Gait training   Progress Progressing toward goals   Discharge Recommendation   Equipment Recommended Walker   PT Therapy Minutes   PT Time In 0830   PT Time Out 0900   PT Total Time (minutes) 30   PT Mode of treatment - Individual (minutes) 30   PT Mode of treatment - Concurrent (minutes) 0   PT Mode of treatment - Group (minutes) 0   PT Mode of treatment - Co-treat (minutes) 0   PT Mode of Treatment - Total time(minutes) 30 minutes   PT Cumulative Minutes 450   Therapy Time missed   Time missed? No

## 2025-04-18 NOTE — ASSESSMENT & PLAN NOTE
"- Reporting \"floater\" within the right eye with acute onset on 4/11/25 at mid-day while watching TV; describes a dark, \"spider\"-shaped floater that occupies a significant portion of the visual field, painless; has similar floaters in the left eye but smaller  - Ophthalmology consulted on [facility] admission by IM, per prior trauma surgery documentation, Dr. Chaudhry of ophthalmology was made personally aware of clinical situation on 4/11/25 prior to [facility] admission  > Ophthalmology consult 4/12: Exam c/w proliferative diabetic retinopathy with vitreous hemorrhage. Pt will follow up with ophtho immediately after discharge and for referral to a retina specialist  > 4/15- Pt thinks his R eye floater may be larger today, unable to clearly state or quantify symptoms. No other visual complaints of increased number of floaters, double/blurry vision, or bright flashes. Advised patient to inform us if symptoms change - will reach out to Ophthalmology accordingly  > 4/18- No changes noted in vision/floaters. His overall impaired vision continues to be a barrier for his ADLs/iADLs    - Monitor for changes in vision  "

## 2025-04-18 NOTE — ASSESSMENT & PLAN NOTE
> Last BM 4/13   4/15- Adjusted bowel medications: Colace 100mg BID, Senna 17.2mg with lunch and PRN Miralax/Suppos  4/17- No BM since 4/13. Lactulose this evening if no BM after lunch. Can consider Relistor if no success for opioid-inducted constipation  4/18- small hard BMs x2 yesterday after lactulose and suppository. Continue bowel regimen    - Will follow BMs and adjust bowel regimen to target soft, formed stools q1-2 days, per pt's regular schedule.

## 2025-04-18 NOTE — ASSESSMENT & PLAN NOTE
- Rehabilitation medicine physician for daily monitoring of care, 24 hour availability for acute medical issues, medication management, and therapeutic and diagnostic assessments.  - 24 hour rehabilitation nursing 7 days per week for: management/teaching of medications, bowel/bladder routine, skin care.  - PT, OT for 2-3 hours per day, 5 to 6 days per week; 15 hours per week  - Rehabilitation Psychology as needed for adjustment and coping  - CM for barriers to discharge, community resources, and family support  - Discharge planning following to help ensure a safe and efficient discharge  - 900/7 program due to HD status, pain, expected therapy tolerance  4/18- Ongoing dispo planning with patient/sister and CM. Staff expressed that patient is progressing well from a functional standpoint but continues to have difficulties with ADLs/iADLs due to impaired vision. Will continue to work towards goals for safe dischare

## 2025-04-18 NOTE — ASSESSMENT & PLAN NOTE
- Hemoglobin todd 7.2 on 04/15  - Hemoglobin improved to 7.7 on 04/17   - Check CBC prior to dialysis on dialysis days  - Continue SURINDER with dialysis (increased to 8000 units q. dialysis this week)  - He was previously not on SURINDER  - Transfuse to keep more than 7

## 2025-04-18 NOTE — PROGRESS NOTES
"   04/18/25 1230   Pain Assessment   Pain Assessment Tool 0-10   Pain Score 7   Pain Location/Orientation Location: Back   Effect of Pain on Daily Activities affects ADLs   Hospital Pain Intervention(s) Repositioned;Rest;Emotional support   Multiple Pain Sites No   Restrictions/Precautions   Precautions Bed/chair alarms;Cognitive;Fall Risk;Pain;Spinal precautions;Supervision on toilet/commode;Visual deficit   ROM Restrictions Yes  (lumbar spinal precautions)   Braces or Orthoses LSO   Lifestyle   Autonomy \"My eye is maddening\"   Lower Body Dressing   Type of Assistance Needed Incidental touching   Physical Assistance Level No physical assistance   Comment Seated in recliner, therapist has pt practice doffing/donning pants. From reading previous note, therapist suggests he show her how he does it seated EOB however pt reports, \"I do it here the same way I do it in bed.\" Pt able to thread LLE while maintaining precautions however begins to break precautions when attempting RLE. Therapist begins education on use of trialing reacher and pt reports, \"I know how to use a reacher.\" Pt able to thread pants over feet utilizing reacher CS and doff/don pants over hips in stance CGA/CS with RW while maintaining precautions. Pt reports sister could help him purchase on [employer] - hand out provided to assist.   Lower Body Dressing CARE Score 4   Putting On/Taking Off Footwear   Comment On this date, pt initially willing to trial reacher and wide sock aid again as pt reports at home he plans to wear hospital socks. However, once therapist retrieved equipment, pt initially attempts to utilize reacher to doff socks and thebstops reporting, \"Everything hurts. I'm not going to do this today.\" Therapist tries to initiate conversation as to his plans to footwear and independence at home. Discussed purchasing slip on shoes such as sneakers without laces or  slip ons. Pt reports sister can help find on [employer], and that \"they'll take " "care of it\" dismissing therapist from further conversation.   Sit to Stand   Type of Assistance Needed Supervision;Adaptive equipment   Physical Assistance Level No physical assistance   Comment RW   Sit to Stand CARE Score 4   Bed-Chair Transfer   Type of Assistance Needed Supervision;Adaptive equipment   Physical Assistance Level No physical assistance   Comment RW   Chair/Bed-to-Chair Transfer CARE Score 4   Light Housekeeping   Light Housekeeping Pt reports PTA he handwashed clothes and hangs them to dry because his laundry is in basement. He reports his sister would only help for larger items such as bedsheets that she would do at her own house PTA. Pt reports at d/c, he suspects his sister will take care of his laundry \"for a few weeks\" because he starts doing it again. On this date, pt able to follow laundry in stance with RW with overall min A approx 2 min 23 sec, before having a posterior LOB requiring support to regain balance and remain upright. This LOB causes pt to want to finish task seated and unwilling to trial further in stance. Pt finishes folding task seated and reports at home he completes folding laundry both seated and in stance.   Health Management   Health Management On this date, discussed plan for med mgmt at d/c. Pt reports he plans to get blister packs from pharmacy d/t visual deficits. Patient able to recall 4 of the 11 medications he is currently taking along with their frequency and purpose for these 3 medications. Pt unsure how many medications he was taking previously, but that the amount he is taking now seems to be \"a lot more.\" Pt receptive of the idea of going through his medication list and making a cheat sheet he can reference with his sister (d/t visual deficits) in case he has any questions or concerns. Initiated this education and med sheet, however unable to finish d/t time constaints. Med cheat sheet left on therapy table to finish at later date if pt continues to find " "this helpful.   Cognition   Overall Cognitive Status Impaired   Arousal/Participation Alert   Attention Attends with cues to redirect   Memory Decreased recall of precautions;Decreased recall of recent events;Decreased short term memory   Following Commands Follows one step commands with increased time or repetition   Comments Patient able to recall \"BLT\" but unaware of what this stands for requring re-education on this date.   Additional Activities   Additional Activities Other (Comment)   Additional Activities Comments Functional mobility with RW room -> therapy gym CS with cues for direction, however on way back pt more fatigued and agitated requiring min A to manage RW d/t low vision.   Activity Tolerance   Activity Tolerance Patient tolerated treatment well   Assessment   Treatment Assessment Patient engaged in 90 min treatment session with focus on ADL retraining, LBAE training, functional transfers/mobility with RW, education of precautions, and IADL training. Initiated laundry tasks and med mgmt. Pt with posterior LOB in stance during folding laundry tasks requiring assistance to remain upright from therapist. For med mgmt- While pt plans to utilize blister packs at d/c d/t  low vision, initiated med cheat sheet for pt to reference with sister if he has questions - left of therapy table if pt continues to find it helpful to finish. Pt vastly unaware of most medications he is own at this time. See above for treatment details. Per the primary OT, a DC date has been set for Wednesday 4/23 w/ HH therapies. It is of upmost importance to focus on increasing the pts independence w/ LB dressing and footwear but also focusing on IADL retraining including meal prep, medication mgmt, finance mgmt, laundry, etc. If pt uses his phone for a majority of his finance mgmt then it might be beneficial to change the settings on his phone for someone who is visually impaired (ie. Increasing text size, adding an auditory piece to " it, educating on using sherice, etc.). Pt does tend to be apprehensive to trialing different things especially when he is experiencing increased pain but in order to reach an IND level by Wednesday, he will need to address all of these tasks. Pending his progress, we can look at progressing to IRP on Monday.   Prognosis Fair   Problem List Decreased endurance;Impaired balance;Decreased mobility;Decreased safety awareness;Pain;Impaired vision   Barriers to Discharge Inaccessible home environment;Decreased caregiver support   Plan   Treatment/Interventions ADL retraining;Functional transfer training;Therapeutic exercise;Endurance training;Cognitive reorientation;Patient/family training;Equipment eval/education;Compensatory technique education   Progress Progressing toward goals   Discharge Recommendation   Rehab Resource Intensity Level, OT   (pending)   OT Therapy Minutes   OT Time In 1230   OT Time Out 1400   OT Total Time (minutes) 90   OT Mode of treatment - Individual (minutes) 90   OT Mode of treatment - Concurrent (minutes) 0   OT Mode of treatment - Group (minutes) 0   OT Mode of treatment - Co-treat (minutes) 0   OT Mode of Treatment - Total time(minutes) 90 minutes   OT Cumulative Minutes 474   Therapy Time missed   Time missed? No

## 2025-04-18 NOTE — ASSESSMENT & PLAN NOTE
- Sustained from mechanical fall several days prior to initial presentation on 4/2/25, presenting with progressively worsening low back pain. Trauma imaging revealed a large retroperitoneal hematoma with active extravasation and hemoglobin drop. Now s/p transfusion, Kcentra and ultimately IR embolization of the right deep circumflex iliac artery and superior gluteal artery with Dr. Garcia on 4/3/25. Unfortunately, on 4/6/25, his hemoglobin decreased and repeat CT showed a 5 mm inferior gluteal artery branch pseudoaneurysm and he is s/p IR pelvic angiogram on 4/7/25 with Dr. Hernandez, which demonstrated pseudoaneurysms at the distal branches of the left inferior gluteal artery which was embolized.   > 4/15: Hgb 7.2 today (from 7.6 yesterday). Back pain seems controlled. Will be receiving increased Epogen today per IM/Nephro  > Hgb 7.7 on 4/17 labs    - Continue to trend Hgb on CBC closely  - Consider repeat imaging if Hgb continues to downtrend or pain significantly worsens  - Contact IR if there are additional bleeding concerns given multiple prior interventions described above  - Local right hip/groin access site incision care  - Analgesia, see below  - PT and OT

## 2025-04-18 NOTE — PROGRESS NOTES
NEPHROLOGY HOSPITAL PROGRESS NOTE   Naresh Carpio 64 y.o. male MRN: [id number]  Unit/Bed#: -01 Encounter: [id number]  Reason for Consult: ESRD on HD  Assessment & Plan  ESRD (end stage renal disease) (HCC)  - TTS at [facility]  - Access: Right IJ permacath  - EDW 97 kg, last post HD weight 96 kg  - Adjust EDW to 96.5 kg and challenge as tolerated  - Last HD: 04/17  - Next HD: 0/19  Primary hypertension  - Blood pressure is currently well-controlled  - Continue nifedipine at current dose  Anemia due to chronic kidney disease, on chronic dialysis ([facility])  - Hemoglobin todd 7.2 on 04/15  - Hemoglobin improved to 7.7 on 04/17   - Check CBC prior to dialysis on dialysis days  - Continue SURINDER with dialysis (increased to 8000 units q. dialysis this week)  - He was previously not on SURINDER  - Transfuse to keep more than 7  Hyperphosphatemia  - Phosphorus level 4.6 on 04/15  - Continue sevelamer with meals  - Check phosphorus level once a week while hospitalized  Chronic diastolic (congestive) heart failure (HCC)  - Ultrafiltration as tolerated on HD  Traumatic retroperitoneal hematoma  - Status post IR embolization  - Management per primary team  - As of 04/17 hemoglobin started to rise, continue to monitor  - If hemoglobin drops further may need further evaluation with CT scan  Paroxysmal atrial fibrillation (HCC)  - Management per primary team  Diabetic ulcer of left midfoot associated with type 2 diabetes mellitus, limited to breakdown of skin ([facility])  - Management per primary team  Hyponatremia  - This is due to lack of free water clearance in ESRD  - Sodium level improved to 131 on 04/17 after fluid restriction and ongoing UF  - Check sodium level prior to dialysis on dialysis days  - Continue fluid restriction 1200 cc per 24 hours  - Continue 138 mEq sodium bath on dialysis today  - Continue ultrafiltration as tolerated on HD      I have reviewed the nephrology recommendations including stability of  hemoglobin and to monitor CBC and plan for dialysis tomorrow, with internal medicine team, and we are in agreement with renal plan including the information outlined above.    SUBJECTIVE / 24H INTERVAL HISTORY:  Feels tired.  Denies dyspnea.  Has ongoing leg swelling.    OBJECTIVE:  Current Weight: Weight - Scale: 94.9 kg (209 lb 3.2 oz)  Vitals:    04/17/25 1649 04/17/25 2032 04/18/25 0550 04/18/25 0600   BP: 151/85 155/67 105/51    BP Location: Left arm Left arm Left arm    Pulse: 68 67 58    Resp: 16 17 16    Temp: (!) 97.2 °F (36.2 °C) 98.1 °F (36.7 °C) 97.8 °F (36.6 °C)    TempSrc: Temporal Oral Oral    SpO2:  98% 97%    Weight:    94.9 kg (209 lb 3.2 oz)   Height:           Intake/Output Summary (Last 24 hours) at 4/18/2025 0853  Last data filed at 4/18/2025 0849  Gross per 24 hour   Intake 1470 ml   Output 3001 ml   Net -1531 ml     Review of Systems   Constitutional:  Negative for chills and fever.   HENT:  Negative for ear pain and sore throat.    Eyes:  Negative for pain and visual disturbance.   Respiratory:  Negative for cough and shortness of breath.    Cardiovascular:  Negative for chest pain and palpitations.   Gastrointestinal:  Negative for abdominal pain and vomiting.   Genitourinary:  Negative for dysuria and hematuria.   Musculoskeletal:  Negative for arthralgias and back pain.   Skin:  Negative for color change and rash.   Neurological:  Negative for seizures and syncope.   All other systems reviewed and are negative.    Physical Exam  Vitals and nursing note reviewed.   Constitutional:       General: He is not in acute distress.     Appearance: He is well-developed.   HENT:      Head: Normocephalic and atraumatic.   Eyes:      Conjunctiva/sclera: Conjunctivae normal.   Cardiovascular:      Rate and Rhythm: Normal rate and regular rhythm.      Pulses: Normal pulses.      Heart sounds: Normal heart sounds. No murmur heard.     Comments: Right chest wall permacath present  Pulmonary:      Effort:  Pulmonary effort is normal. No respiratory distress.      Breath sounds: Normal breath sounds.   Abdominal:      Palpations: Abdomen is soft.      Tenderness: There is no abdominal tenderness.   Musculoskeletal:         General: No swelling.      Cervical back: Neck supple.      Right lower leg: Edema present.      Left lower leg: Edema present.   Skin:     General: Skin is warm and dry.      Capillary Refill: Capillary refill takes less than 2 seconds.   Neurological:      Mental Status: He is alert.   Psychiatric:         Mood and Affect: Mood normal.       Medications:    Current Facility-Administered Medications:     acetaminophen (TYLENOL) tablet 975 mg, 975 mg, Oral, Q8H KEMAL, Damian Cerda MD, 975 mg at 04/18/25 0551    allopurinol (ZYLOPRIM) tablet 100 mg, 100 mg, Oral, Daily, Damian Cerda MD, 100 mg at 04/18/25 0804    apixaban (ELIQUIS) tablet 5 mg, 5 mg, Oral, BID, Damian Cerda MD, 5 mg at 04/18/25 0804    atorvastatin (LIPITOR) tablet 80 mg, 80 mg, Oral, Daily With Dinner, Damian Cerda MD, 80 mg at 04/17/25 1703    bisacodyl (DULCOLAX) rectal suppository 10 mg, 10 mg, Rectal, Daily PRN, Michelle T Lnada, DO, 10 mg at 04/18/25 0000    docusate sodium (COLACE) capsule 100 mg, 100 mg, Oral, BID, Michelle T Landa, DO, 100 mg at 04/18/25 0804    epoetin jessica (EPOGEN,PROCRIT) injection 8,000 Units, 8,000 Units, Intravenous, After Dialysis, German Linton MD, 8,000 Units at 04/17/25 1651    gabapentin (NEURONTIN) capsule 100 mg, 100 mg, Oral, Once per day on Monday Wednesday Friday, Damian Cerda MD, 100 mg at 04/18/25 0804    lidocaine (LIDODERM) 5 % patch 1 patch, 1 patch, Topical, Daily PRN, Damian Cerda MD, 1 patch at 04/13/25 2046    melatonin tablet 6 mg, 6 mg, Oral, HS, Jayden Rowland MD, 6 mg at 04/17/25 2222    methocarbamol (ROBAXIN) tablet 1,000 mg, 1,000 mg, Oral, Q8H [facility], Jayden Rowland MD, 1,000 mg at 04/18/25 0551    naloxone (NARCAN) 0.04 mg/mL syringe 0.04 mg, 0.04 mg, Intravenous,  "Q1MIN PRN, Damian Cerda MD    NIFEdipine (PROCARDIA XL) 24 hr tablet 30 mg, 30 mg, Oral, After Dinner, Damian Cerda MD, 30 mg at 04/17/25 1703    oxyCODONE (ROXICODONE) IR tablet 5 mg, 5 mg, Oral, Q4H PRN **OR** oxyCODONE (ROXICODONE) immediate release tablet 10 mg, 10 mg, Oral, Q4H PRN, Damian Cerda MD, 10 mg at 04/18/25 0550    oxyCODONE (ROXICODONE) split tablet 2.5 mg, 2.5 mg, Oral, Q8H PRN, Jayden Rowland MD    polyethylene glycol (MIRALAX) packet 17 g, 17 g, Oral, Daily PRN, Michelle Landa DO, 17 g at 04/17/25 0845    senna (SENOKOT) tablet 17.2 mg, 2 tablet, Oral, Daily With Lunch, Michelle Landa DO, 17.2 mg at 04/17/25 1153    sevelamer (RENAGEL) tablet 1,600 mg, 1,600 mg, Oral, TID With Meals, Damian Cerda MD, 1,600 mg at 04/18/25 0805    white petrolatum-mineral oil (EUCERIN,HYDROCERIN) cream, , Topical, TID PRN, Michelle Landa DO    Laboratory Results:  Results from last 7 days   Lab Units 04/17/25  1248 04/15/25  0447 04/15/25  0428 04/14/25  0611 04/13/25  1307   WBC Thousand/uL 7.96  --  8.35 9.11  --    HEMOGLOBIN g/dL 7.7*  --  7.2* 7.6* 8.0*   HEMATOCRIT % 24.5*  --  22.8* 24.7* 25.4*   PLATELETS Thousands/uL 324  --  318 336  --    POTASSIUM mmol/L 4.2 4.7  --  4.7  --    CHLORIDE mmol/L 94* 89*  --  93*  --    CO2 mmol/L 25 24  --  26  --    BUN mg/dL 47* 55*  --  39*  --    CREATININE mg/dL 6.83* 7.54*  --  6.16*  --    CALCIUM mg/dL 9.2 8.9  --  9.1  --    PHOSPHORUS mg/dL  --  4.6*  --  4.2*  --        Portions of the record may have been created with voice recognition software. Occasional wrong word or \"sound a like\" substitutions may have occurred due to the inherent limitations of voice recognition software. Read the chart carefully and recognize, using context, where substitutions have occurred.If you have any questions, please contact the dictating provider.    "

## 2025-04-18 NOTE — PROGRESS NOTES
"   04/18/25 3430   Pain Assessment   Pain Assessment Tool 0-10   Pain Score 5   Pain Location/Orientation Orientation: Lower;Location: Back;Orientation: Right;Location: Hip   Pain Onset/Description Descriptor: Sore;Onset: Ongoing   Effect of Pain on Daily Activities inc pain with [facility]   Hospital Pain Intervention(s) Repositioned;Rest   Restrictions/Precautions   Precautions Bed/chair alarms;Cognitive;Fall Risk;Pain;Spinal precautions;Supervision on toilet/commode;Visual deficit   ROM Restrictions Yes  (L/S precautions)   Braces or Orthoses LSO  (OOB)   Cognition   Attention Attends with cues to redirect   Following Commands Follows one step commands with increased time or repetition   Subjective   Subjective \"Im fine with the physical stuff its my vision thats the problem\"   Sit to Stand   Type of Assistance Needed Supervision;Adaptive equipment   Comment RW   Sit to Stand CARE Score 4   Bed-Chair Transfer   Type of Assistance Needed Supervision;Adaptive equipment   Comment RW   Chair/Bed-to-Chair Transfer CARE Score 4   Transfer Bed/Chair/Wheelchair   Limitations Noted In Balance;LE Strength;Vision   Adaptive Equipment Roller Walker   Walk 10 Feet   Type of Assistance Needed Supervision;Adaptive equipment   Comment RW   Walk 10 Feet CARE Score 4   Walk 50 Feet with Two Turns   Type of Assistance Needed Supervision;Adaptive equipment   Comment RW, CS   Walk 50 Feet with Two Turns CARE Score 4   Ambulation   Primary Mode of Locomotion Prior to Admission Walk   Distance Walked (feet) 100 ft  (x2)   Assist Device Roller Walker   Gait Pattern Slow Lisa;Decreased foot clearance;Step through;Shuffle;Wide GARO;Improper weight shift;Forward Flexion  (VC to keep body closer to front of RW)   Limitations Noted In Heel Strike;Posture;Speed;Endurance;Other  (vision)   Provided Assistance with: Direction  (VC given for barriers in hallway on his R side)   Walk Assist Level Supervision   Does the patient walk? 2. Yes " "  Wheelchair mobility   Does the patient use a wheelchair? 0. No   Curb or Single Stair   Style negotiated Single stair  (6\")   Type of Assistance Needed Incidental touching   Comment BUE support on BHR   1 Step (Curb) CARE Score 4   4 Steps   Type of Assistance Needed Incidental touching   4 Steps CARE Score 4   Stairs   Type Stairs   # of Steps 4   Assist Devices Bilateral Rail;Single Rail   Findings 6\" practice stairs x2 w/ BHR; started at 8th floor and pt amb to steps, manage folding RW, asc 4 steps with L HR and RW in RUE before pt requested to desc due to fatigue   Therapeutic Interventions   Balance Mimic pt bringing in groceries, managing balance at RW with single UE support on RW, using opposite hand to  grocery bag and place on highest step possible (used practice stairs so he could only use the two) to mimic/assess balance while bending and reaching. This should be a continuious focus as pt had a hard time giving specific details about set up with car proximety to steps. However at end of session he said he may ask his sister to do grocery shopping for him instead of grocery delivery to make this easier for him, which would be the recommendation for home to get help with this task.  Pt also reported that his steps are wooden so there is more resistance and less slippage than the FF steps used earlier in session, he agreed that the 4th floor  steps are wider and more like what he has at home, will practice on these.   Assessment   Treatment Assessment Pt participated in 60 min PT session this morning to address dec LE strength, balance, and endurance. Upon start of session pt was in agreement to practice things to get him to be able to go home safely. As session proceeded any further questions even if they were clarifying questions seemed to be bothersome. Overall pt is improving in LE strength balance and endurance and is needing less physical assist/VC overall. However, while resting prior " "to stair negotiation training pt reported \"no one is listening to me,\" with follow up pt clarified that that physically he's not worried about his progress- he feels hes getting better, but its his vision that will be the problem. He reports hes not going to be able to drive and get himself to dialysis. Discussed with pt that we will work with the rest of the team/SW to come up with a plan that works for him and keeps him safe. Pt agreeable to this and mood improved with distraction of other topics. Continue per POC to emphasize LE strengthening, functional task practice and stair negotiation to be able to d/c safely to home.   Problem List Decreased endurance;Impaired balance;Decreased mobility;Decreased safety awareness;Pain   Barriers to Discharge Decreased caregiver support;Inaccessible home environment   PT Barriers   Physical Impairment Decreased strength;Decreased range of motion;Decreased endurance;Impaired balance;Decreased mobility;Impaired vision   Functional Limitation Car transfers;Transfers;Standing;Stair negotiation;Walking   Plan   Treatment/Interventions ADL retraining;Functional transfer training;LE strengthening/ROM;Elevations;Therapeutic exercise;Endurance training;Cognitive reorientation;Patient/family training;Equipment eval/education;Bed mobility;Gait training;Compensatory technique education   Progress Progressing toward goals   PT Therapy Minutes   PT Time In 0930   PT Time Out 1030   PT Total Time (minutes) 60   PT Mode of treatment - Individual (minutes) 60   PT Mode of treatment - Concurrent (minutes) 0   PT Mode of treatment - Group (minutes) 0   PT Mode of treatment - Co-treat (minutes) 0   PT Mode of Treatment - Total time(minutes) 60 minutes   PT Cumulative Minutes 510   Therapy Time missed   Time missed? No     "

## 2025-04-18 NOTE — ASSESSMENT & PLAN NOTE
Patient reports he has an overall a poor sleep hygiene: he sleeps at 1-2am nightly and watches TV up until bed.  Advised patient to attempt to regulate sleep/wake cycle while here to better participate in therapy. Attempt to try to sleep earlier around midnight  >4/15: reviewed sleep log: Pt went to sleep around midnight and slept for 6 hours.  > 4/17- Poor sleep last night: was awake every other hour. Requiring nap during the day. Can increase melatonin tomorrow if this persists  > 4/18: Poor sleep continues. Pt endorses moving from bed to chair multiple times throughout the night which is his baseline at home too    - Continue sleep log  - Continue melatonin 6mg nightly

## 2025-04-18 NOTE — ASSESSMENT & PLAN NOTE
- Phosphorus level 4.6 on 04/15  - Continue sevelamer with meals  - Check phosphorus level once a week while hospitalized

## 2025-04-18 NOTE — PROGRESS NOTES
[facility] in [place] phoned and stated they would like a phone call when pt has a definitive D/C date so they can have a chair for him. I stated that pt may D/C on 4/23, but that's still TBD.     Phone number [phone]

## 2025-04-18 NOTE — PROGRESS NOTES
"Progress Note - Hospitalist   Name: Naresh Carpio 64 y.o. male I MRN: [id number]  Unit/Bed#: -01 I Date of Admission: 4/11/2025   Date of Service: 4/18/2025 I Hospital Day: 7    Assessment & Plan  Traumatic retroperitoneal hematoma  Admitted to the trauma service after a fall with L2 vertebral body and retroperitoneal hematoma.  S/p IR embolization of distal right deep circumflex iliac artery/distal right superior gluteal artery both of which supplied an area of active extravasation and then pseudoaneurysms present at distal branches of the left inferior gluteal artery. The artery was embolized using coils and gelfoam.   Hemoglobin stable on 4/14 and on 4/17 had improved to 7.7  Will continue to watch  He was type and screened 4/17/25 in case needed blood during HD on that day.  Will get a CBC with HD on 4/19/25  Anemia due to chronic kidney disease, on chronic dialysis ([facility])  Had 5 U PRBCs during hospital stay  Hemoglobin has been running mostly 7.3-7.4.    On 4/14/25 was 7.6; on 4/15/24 was 7.2 ---> Renal increased his Epogen in light of his hemoglobin  On 4/17, hemoglobin had increased to 7.7  ESRD (end stage renal disease) ([facility])  Continue Sevelamer TID with meals  Renal following  HD T-TH-Sat while here in rehab  Paroxysmal atrial fibrillation ([facility])  On no rate control medications  Examines RRR  Anticoagulation with Eliquis as at home  Chronic diastolic (congestive) heart failure ([facility])  Appears euvolemic except for LE pitting edema which appears to be chronic  Renal diet  stable  Volume management via HD  Primary hypertension  Continue Procardia XL 30 mg qd  Tx per renal  Visual disturbance  Reported a right eye \"floater\" without associated pain earlier in his hospitalization which has resolved but continued to have blurry central vision.  Ophthalmology saw over the weekend = visual acuity without correction is 20/400 OU. Dx: Proliferative diabetic retinopathy with vitreous hemorrhage OU as well as " "cataracts.   Will need outpatient follow up immediately after discharge from rehab  Diabetic ulcer of left midfoot associated with type 2 diabetes mellitus, limited to breakdown of skin (HCC)  Continue local care  Being followed by WC  Last LEADS was 3/28/25 = 50-75% right prox polital and prox tibioperoneal trunk; >75% stenosis in prox popliteal artery on left  Was seen by VS in 12/2024 hospital stay and he declined any intervention at the time  Hyponatremia  Renal following  Tx/adjust with HD  Closed fracture of proximal end of left humerus with routine healing  Occurred from a fall in February and eval'd at the time of the injury  Was to continue NWB/sling and followup with Ortho but there was no followup done  Xray done 4/13/25 = \"Unchanged alignment of the chronic greater tuberosity avulsion fracture\".   Ortho saw here on 4/14 = [facility]; see Dr Cisneros 4 weeks  Gout  Continue allopurinol  L2 vertebral fracture (HCC)  CT CAP 4/2: Fracture of L2 vertebral body extending to the superior endplate  Continue LSO brace  Insomnia  Patient reports not sleeping well at night because he is awake worrying about his medical conditions  Discussed with PMR over the weekend and started melatonin    The above assessment and plan was reviewed and updated as determined by my evaluation of the patient on 4/18/2025.    History of Present Illness   Patient seen and examined. Patients overnight issues or events were reviewed with nursing staff. New or overnight issues include the following:   No new or overnight issues.  Offers no complaints    Review of Systems   All other systems reviewed and are negative.      Objective :  Temp:  [97.2 °F (36.2 °C)-98.1 °F (36.7 °C)] 97.8 °F (36.6 °C)  HR:  [58-72] 58  BP: (105-155)/(48-85) 105/51  Resp:  [16-18] 16  SpO2:  [97 %-98 %] 97 %  O2 Device: None (Room air)    Invasive Devices       Hemodialysis Catheter  Duration             HD Permanent Double Catheter 597 days              "       Physical Exam  General Appearance: no distress, non toxic appearing  HEENT: PERRLA, conjuctiva normal; oropharynx clear; mucous membranes moist   Neck:  Supple, normal ROM  Lungs: CTA, normal respiratory effort, no retractions, expiratory effort normal  CV: regular rate and rhythm; no rubs/murmurs/gallops, PMI normal   ABD: soft; ND/NT; +BS  EXT: + lower leg edema  Skin: normal turgor, normal texture  Psych: affect normal, mood normal  Neuro: AAO        The above physical exam was reviewed and updated as determined by my evaluation of the patient on 4/18/2025.      Lab Results: I have reviewed the following results:  Results from last 7 days   Lab Units 04/17/25  1248 04/15/25  0428   WBC Thousand/uL 7.96 8.35   HEMOGLOBIN g/dL 7.7* 7.2*   HEMATOCRIT % 24.5* 22.8*   PLATELETS Thousands/uL 324 318     Results from last 7 days   Lab Units 04/17/25  1248 04/15/25  0447   SODIUM mmol/L 131* 125*   POTASSIUM mmol/L 4.2 4.7   CHLORIDE mmol/L 94* 89*   CO2 mmol/L 25 24   BUN mg/dL 47* 55*   CREATININE mg/dL 6.83* 7.54*   CALCIUM mg/dL 9.2 8.9                   Imaging Results Review: No pertinent imaging studies reviewed.  Other Study Results Review: No additional pertinent studies reviewed.    Review of Scheduled Meds: Medications  reviewed and reconciled as needed  Current Facility-Administered Medications   Medication Dose Route Frequency Provider Last Rate    acetaminophen  975 mg Oral Q8H [facility] Damian Cerda MD      allopurinol  100 mg Oral Daily Damian Cerda MD      apixaban  5 mg Oral BID Damian Cerda MD      atorvastatin  80 mg Oral Daily With Dinner Damian Cerda MD      bisacodyl  10 mg Rectal Daily PRN Michelle T Landa, DO      docusate sodium  100 mg Oral BID Michelle T Lanad, DO      epoetin jessica  8,000 Units Intravenous After Dialysis German Linton MD      gabapentin  100 mg Oral Once per day on Monday Wednesday Friday Damian Cerda MD      lidocaine  1 patch Topical Daily PRN Damian Cerda MD       melatonin  6 mg Oral HS Jayden Rowland MD      methocarbamol  1,000 mg Oral Q8H KEMAL Jayden Rowland MD      naloxone  0.04 mg Intravenous Q1MIN PRN Damian Cerda MD      NIFEdipine  30 mg Oral After Dinner Damian Cerda MD      oxyCODONE  5 mg Oral Q4H PRN Damian Cerda MD      Or    oxyCODONE  10 mg Oral Q4H PRN Damian Cerda MD      oxyCODONE  2.5 mg Oral Q8H PRN Jayden Rowland MD      polyethylene glycol  17 g Oral Daily PRN Michelle T Landa, DO      senna  2 tablet Oral Daily With Lunch Michelle T Cordell, DO      sevelamer  1,600 mg Oral TID With Meals Damian Cerda MD      white petrolatum-mineral oil   Topical TID PRN Michelle Landa, DO         VTE Pharmacologic Prophylaxis: Eliquis  Code Status: Level 1 - Full Code  Current Length of Stay: 7 day(s)    Administrative Statements     ** Please Note:  voice to text software may have been used in the creation of this document. Although proof errors in transcription or interpretation are a potential of such software**

## 2025-04-18 NOTE — ASSESSMENT & PLAN NOTE
- Status post IR embolization  - Management per primary team  - As of 04/17 hemoglobin started to rise, continue to monitor  - If hemoglobin drops further may need further evaluation with CT scan

## 2025-04-18 NOTE — ASSESSMENT & PLAN NOTE
> 4/14: Hgb 7.6  > 4/15: Hgb 7.2 - Discussed with IM who is coordinating with Nephrology; they will increase his Epoetin jessica  > 4/17: Hgb 7.7    - Trend Hgb on CBC  - Epoetin jessica with dialysis  - Transfusions per IM, appreciate their recommendations

## 2025-04-18 NOTE — ASSESSMENT & PLAN NOTE
- This is due to lack of free water clearance in ESRD  - Sodium level improved to 131 on 04/17 after fluid restriction and ongoing UF  - Check sodium level prior to dialysis on dialysis days  - Continue fluid restriction 1200 cc per 24 hours  - Continue 138 mEq sodium bath on dialysis today  - Continue ultrafiltration as tolerated on HD

## 2025-04-18 NOTE — PLAN OF CARE
Problem: PAIN - ADULT  Goal: Verbalizes/displays adequate comfort level or baseline comfort level  Description: Interventions:- Encourage patient to monitor pain and request assistance- Assess pain using appropriate pain scale- Administer analgesics based on type and severity of pain and evaluate response- Implement non-pharmacological measures as appropriate and evaluate response- Consider cultural and social influences on pain and pain management- Notify physician/advanced practitioner if interventions unsuccessful or patient reports new pain  Outcome: Progressing     Problem: INFECTION - ADULT  Goal: Absence or prevention of progression during hospitalization  Description: INTERVENTIONS:- Assess and monitor for signs and symptoms of infection- Monitor lab/diagnostic results- Monitor all insertion sites, i.e. indwelling lines, tubes, and drains- Monitor endotracheal if appropriate and nasal secretions for changes in amount and color- [place] appropriate cooling/warming therapies per order- Administer medications as ordered- Instruct and encourage patient and family to use good hand hygiene technique- Identify and instruct in appropriate isolation precautions for identified infection/condition  Outcome: Progressing  Goal: Absence of fever/infection during neutropenic period  Description: INTERVENTIONS:- Monitor WBC  Outcome: Progressing     Problem: SAFETY ADULT  Goal: Patient will remain free of falls  Description: INTERVENTIONS:- Educate patient/family on patient safety including physical limitations- Instruct patient to call for assistance with activity - Consult OT/PT to assist with strengthening/mobility - Keep Call bell within reach- Keep bed low and locked with side rails adjusted as appropriate- Keep care items and personal belongings within reach- Initiate and maintain comfort rounds- Make Fall Risk Sign visible to staff- Offer Toileting every 2 Hours, in advance of need- Initiate/Maintain chair alarm-  Obtain necessary fall risk management equipment: - Apply yellow socks and bracelet for high fall risk patients- Consider moving patient to room near nurses station  Outcome: Progressing  Goal: Maintain or return to baseline ADL function  Description: INTERVENTIONS:-  Assess patient's ability to carry out ADLs; assess patient's baseline for ADL function and identify physical deficits which impact ability to perform ADLs (bathing, care of mouth/teeth, toileting, grooming, dressing, etc.)- Assess/evaluate cause of self-care deficits - Assess range of motion- Assess patient's mobility; develop plan if impaired- Assess patient's need for assistive devices and provide as appropriate- Encourage maximum independence but intervene and supervise when necessary- Involve family in performance of ADLs- Assess for home care needs following discharge - Consider OT consult to assist with ADL evaluation and planning for discharge- Provide patient education as appropriate  Outcome: Progressing  Goal: Maintains/Returns to pre admission functional level  Description: INTERVENTIONS:- Perform AM-PAC 6 Click Basic Mobility/ Daily Activity assessment daily.- Set and communicate daily mobility goal to care team and patient/family/caregiver. - Collaborate with rehabilitation services on mobility goals if consulted- Perform Range of Motion 3 times a day.- Reposition patient every 2 hours.- Dangle patient 3 times a day- Stand patient 3 times a day- Ambulate patient 3 times a day- Out of bed to chair 3 times a day - Out of bed for meals 3 times a day- Out of bed for toileting- Record patient progress and toleration of activity level   Outcome: Progressing     Problem: DISCHARGE PLANNING  Goal: Discharge to home or other facility with appropriate resources  Description: INTERVENTIONS:- Identify barriers to discharge w/patient and caregiver- Arrange for needed discharge resources and transportation as appropriate- Identify discharge learning  needs (meds, wound care, etc.)- Arrange for interpretive services to assist at discharge as needed- Refer to Case Management Department for coordinating discharge planning if the patient needs post-hospital services based on physician/advanced practitioner order or complex needs related to functional status, cognitive ability, or social support system  Outcome: Progressing     Problem: Prexisting or High Potential for Compromised Skin Integrity  Goal: Skin integrity is maintained or improved  Description: INTERVENTIONS:- Identify patients at risk for skin breakdown- Assess and monitor skin integrity- Assess and monitor nutrition and hydration status- Monitor labs - Assess for incontinence - Turn and reposition patient- Assist with mobility/ambulation- Relieve pressure over bony prominences- Avoid friction and shearing- Provide appropriate hygiene as needed including keeping skin clean and dry- Evaluate need for skin moisturizer/barrier cream- Collaborate with interdisciplinary team - Patient/family teaching- Consider wound care consult   Outcome: Progressing     Problem: METABOLIC, FLUID AND ELECTROLYTES - ADULT  Goal: Electrolytes maintained within normal limits  Description: INTERVENTIONS:- Monitor labs and assess patient for signs and symptoms of electrolyte imbalances- Administer electrolyte replacement as ordered- Monitor response to electrolyte replacements, including repeat lab results as appropriate- Instruct patient on fluid and nutrition as appropriate  Outcome: Progressing  Goal: Fluid balance maintained  Description: INTERVENTIONS:- Monitor labs - Monitor I/O and WT- Instruct patient on fluid and nutrition as appropriate- Assess for signs & symptoms of volume excess or deficit  Outcome: Progressing

## 2025-04-18 NOTE — ASSESSMENT & PLAN NOTE
Had 5 U PRBCs during hospital stay  Hemoglobin has been running mostly 7.3-7.4.    On 4/14/25 was 7.6; on 4/15/24 was 7.2 ---> Renal increased his Epogen in light of his hemoglobin  On 4/17, hemoglobin had increased to 7.7

## 2025-04-18 NOTE — PROGRESS NOTES
Progress Note - PMR   Name: Naresh Carpio 64 y.o. male I MRN: [id number]  Unit/Bed#: -01 I Date of Admission: 4/11/2025   Date of Service: 4/18/2025 I Hospital Day: 7     Assessment & Plan  Traumatic retroperitoneal hematoma  - Sustained from mechanical fall several days prior to initial presentation on 4/2/25, presenting with progressively worsening low back pain. Trauma imaging revealed a large retroperitoneal hematoma with active extravasation and hemoglobin drop. Now s/p transfusion, Kcentra and ultimately IR embolization of the right deep circumflex iliac artery and superior gluteal artery with Dr. Garcia on 4/3/25. Unfortunately, on 4/6/25, his hemoglobin decreased and repeat CT showed a 5 mm inferior gluteal artery branch pseudoaneurysm and he is s/p IR pelvic angiogram on 4/7/25 with Dr. Hernandez, which demonstrated pseudoaneurysms at the distal branches of the left inferior gluteal artery which was embolized.   > 4/15: Hgb 7.2 today (from 7.6 yesterday). Back pain seems controlled. Will be receiving increased Epogen today per IM/Nephro  > Hgb 7.7 on 4/17 labs    - Continue to trend Hgb on CBC closely  - Consider repeat imaging if Hgb continues to downtrend or pain significantly worsens  - Contact IR if there are additional bleeding concerns given multiple prior interventions described above  - Local right hip/groin access site incision care  - Analgesia, see below  - PT and OT  Acute pain due to trauma  - Evaluated by APS on acute care  - Tylenol 975 mg q8h scheduled  - Gabapentin 100 mg three times weekly  - Methocarbamol 750 mg q8h scheduled  - Lidocaine patches daily PRN  - Oxycodone 5 or 10 mg q4h PRN, wean as possible  Impaired mobility and activities of daily living  - Rehabilitation medicine physician for daily monitoring of care, 24 hour availability for acute medical issues, medication management, and therapeutic and diagnostic assessments.  - 24 hour rehabilitation nursing 7 days per week  for: management/teaching of medications, bowel/bladder routine, skin care.  - PT, OT for 2-3 hours per day, 5 to 6 days per week; 15 hours per week  - Rehabilitation Psychology as needed for adjustment and coping  - CM for barriers to discharge, community resources, and family support  - Discharge planning following to help ensure a safe and efficient discharge  - 900/7 program due to HD status, pain, expected therapy tolerance  4/18- Ongoing dispo planning with patient/sister and CM. Staff expressed that patient is progressing well from a functional standpoint but continues to have difficulties with ADLs/iADLs due to impaired vision. Will continue to work towards goals for safe dischare    L2 vertebral fracture (HCC)  > Sustained from a mechanical fall on 3/24/25, found to have an acute, obliquely oriented fracture of the L2 vertebral body with distraction of the dominant fracture fragment and involvement of the anterior and posterior cortices and superior endplate  > At that time, managed non-operatively with LSO bracing, though it does not appear orthopedic spine surgery or neurosurgery evaluated at the time of injury    - Maintain LSO brace with lumbar spinal precautions   - Should follow-up with spine surgery outpatient  Closed fracture of proximal end of left humerus with routine healing  - Sustained from fall in early February, per CT of the LUE on 2/9/25, there was a minimally displaced fracture of the anterior facet of the greater tuberosity of the left humerus, minimally apparent on repeat x-ray of the shoulder on 3/24/25  - He was evaluated by orthopedic surgery at the time of the initial injury and was recommended to maintain NWB through the LUE with a sling  - There is no further orthopedic evaluation documentation and it does not appear that further follow-up occurred.  - Clinically, he is not describing ongoing significant left arm or shoulder pain and has been weight-bearing through the LUE  > 4/14:  "Repeat humerus X-ray done yesterday shows no change in fracture alignment. Ortho consulted.  >4/15: Ortho c/s: May WBAT to LUE. PT/OT to increase strength and ROM. F/u with Dr. Cisneros in 4 weeks    - Monitor  - PT/OT  Insomnia  Patient reports he has an overall a poor sleep hygiene: he sleeps at 1-2am nightly and watches TV up until bed.  Advised patient to attempt to regulate sleep/wake cycle while here to better participate in therapy. Attempt to try to sleep earlier around midnight  >4/15: reviewed sleep log: Pt went to sleep around midnight and slept for 6 hours.  > 4/17- Poor sleep last night: was awake every other hour. Requiring nap during the day. Can increase melatonin tomorrow if this persists  > 4/18: Poor sleep continues. Pt endorses moving from bed to chair multiple times throughout the night which is his baseline at home too    - Continue sleep log  - Continue melatonin 6mg nightly  At risk for constipation  > Last BM 4/13   4/15- Adjusted bowel medications: Colace 100mg BID, Senna 17.2mg with lunch and PRN Miralax/Suppos  4/17- No BM since 4/13. Lactulose this evening if no BM after lunch. Can consider Relistor if no success for opioid-inducted constipation  4/18- small hard BMs x2 yesterday after lactulose and suppository. Continue bowel regimen    - Will follow BMs and adjust bowel regimen to target soft, formed stools q1-2 days, per pt's regular schedule.  Anemia due to chronic kidney disease, on chronic dialysis (HCC)  > 4/14: Hgb 7.6  > 4/15: Hgb 7.2 - Discussed with IM who is coordinating with Nephrology; they will increase his Epoetin jessica  > 4/17: Hgb 7.7    - Trend Hgb on CBC  - Epoetin jessica with dialysis  - Transfusions per IM, appreciate their recommendations  Visual disturbance  - Reporting \"floater\" within the right eye with acute onset on 4/11/25 at mid-day while watching TV; describes a dark, \"spider\"-shaped floater that occupies a significant portion of the visual field, painless; " "has similar floaters in the left eye but smaller  - Ophthalmology consulted on [facility] admission by IM, per prior trauma surgery documentation, Dr. Chaudhry of ophthalmology was made personally aware of clinical situation on 4/11/25 prior to [facility] admission  > Ophthalmology consult 4/12: Exam c/w proliferative diabetic retinopathy with vitreous hemorrhage. Pt will follow up with ophtho immediately after discharge and for referral to a retina specialist  > 4/15- Pt thinks his R eye floater may be larger today, unable to clearly state or quantify symptoms. No other visual complaints of increased number of floaters, double/blurry vision, or bright flashes. Advised patient to inform us if symptoms change - will reach out to Ophthalmology accordingly  > 4/18- No changes noted in vision/floaters. His overall impaired vision continues to be a barrier for his ADLs/iADLs    - Monitor for changes in vision  Diabetic ulcer of left midfoot associated with type 2 diabetes mellitus, limited to breakdown of skin ([facility])  - Has chronic wounds affecting the left foot plantar surface, left foot 1st and 2nd digits  - For left medial foot, left plantar surface: cleanse with NSS and pat dry. Apply dermagran to plantar wound only. Cover both wounds with silicone bordered foam dressings. Naren with T for Treatment and change every other day or PRN  - Pain left foot 1st and 2nd digits with betadine daily  - See \"at risk for skin breakdown\" for preventive care  - Should follow-up closely with podiatry in the outpatient setting after [facility] discharge, last outpatient documentation appears to be from 2023  Hyponatremia  > 4/14: Na 129 - A&O x3, pt mentating appropriately  > 4/15: Na 125 - Discussed with IM who is coordinating with Nephrology; they will manage Na during HD   > 4/17: Na 131    - Trend on BMP  - Electrolyte management per nephrology  - Continue HD in setting of ESRD  ESRD (end stage renal disease) ([facility])  - Continue HD  - Nephrology consulted " and following, appreciate their recommendations  Paroxysmal atrial fibrillation (HCC)  - Currently rate controlled  - Embolic stroke risk reduction with apixaban 5 mg BID  Chronic diastolic (congestive) heart failure (HCC)  Wt Readings from Last 3 Encounters:   04/18/25 94.9 kg (209 lb 3.2 oz)   04/11/25 98.5 kg (217 lb 2.5 oz)   03/24/25 107 kg (234 lb 12.6 oz)     - Most recent TTE from 10/9/24 with EF 60-65%, grade 1 diastolic dysfunction  - Volume management with HD per nephrology  - Monitor weights  - Appreciate medicine and nephrology recommendations and management  Primary hypertension  - Nifedipine  - Appreciate IM and nephrology management  Gout  - Allopurinol for flare prophylaxis  Hyperphosphatemia  > 4/15: Phos 4.6  - Management per nephrology  - Continue phosphate binders  Wound of skin  - Has right anterior pre-tibial scab, wash gently daily, leave open to air  - Wound care RN consulted and following  At high risk for skin breakdown  - Moisturize skin daily with skin nourishing cream  - Ehob cushion in chair when out of bed.  - Preventative hydraguard to bilateral heels BID and PRN.   - Calazime paste to sacrum/buttocks BID and PRN  - Monitor clinically for breakdown, frequent turns  - Wound care RN consulted and following  At risk for venous thromboembolism (VTE)  - Apixaban, see above    Subjective   64-year-old with past medical history of acute on chronic anemia, atrial fibrillation, 5 cigarettes, diabetes, end-stage renal disease on dialysis presenting on 4/2 with progressive worsening low back pain after falling and having a large left-sided retroperitoneal hematoma with active extravasation initially treated with Kcentra and stabilized with transfusion and transferred to [facility] for IR evaluation embolization and completed on 4/they will continue to have decreasing hemoglobin found to have a 5 mm inferior gluteal artery branch pseudoaneurysm and did require an additional IR pelvic  angiogram with ambulation Dacian on 4/7. Course complicated by acute pain    Chief Complaint: f/u ambulatory dysfunction    Interval: Patient seen and examined. No acute overnight events. Pain is controlled. He is not sleeping well here but endorses that he tends to hop from bed to chair throughout the night at home too. Otherwise, denies any acute complaints. Last BM 4/18 after lactulose and suppository. Labs from HD yesterday notable for: Na 131, Cr 6.83 Hgb 7.7      Objective :  Temp:  [97.2 °F (36.2 °C)-98.1 °F (36.7 °C)] 97.8 °F (36.6 °C)  HR:  [58-72] 58  BP: (105-155)/(48-85) 105/51  Resp:  [16-18] 16  SpO2:  [97 %-98 %] 97 %  O2 Device: None (Room air)    Functional Update:  Mobility: Sup for bed mobility.  Sup to CS ambulation (175') with RW  Transfers: Sup to CS for transfers  with RW  ADLs: Ind eating, sup oral care/ UBD, Rin bathing/LBD, maxA footwear    Physical Exam  Vitals reviewed.   Constitutional:       General: He is not in acute distress.     Appearance: Normal appearance.   HENT:      Head: Normocephalic and atraumatic.      Right Ear: External ear normal.      Left Ear: External ear normal.      Nose: Nose normal.   Cardiovascular:      Rate and Rhythm: Normal rate and regular rhythm.      Heart sounds: Normal heart sounds.   Pulmonary:      Effort: Pulmonary effort is normal. No respiratory distress.      Breath sounds: Normal breath sounds. No wheezing, rhonchi or rales.   Abdominal:      General: Bowel sounds are normal.      Palpations: Abdomen is soft.   Musculoskeletal:         General: Normal range of motion.      Comments: LE edema stable with ACE wraps on. +LSO    Skin:     General: Skin is warm and dry.   Neurological:      Mental Status: He is alert. Mental status is at baseline.   Psychiatric:         Behavior: Behavior normal.           Scheduled Meds:  Current Facility-Administered Medications   Medication Dose Route Frequency Provider Last Rate    acetaminophen  975 mg Oral Q8H  [facility] Damian Cerda MD      allopurinol  100 mg Oral Daily Damian Cerda MD      apixaban  5 mg Oral BID Damian Cerda MD      atorvastatin  80 mg Oral Daily With Dinner Damian Cerda MD      bisacodyl  10 mg Rectal Daily PRN Michelle Landa, DO      docusate sodium  100 mg Oral BID Michelle LAMONTE Landa, DO      epoetin jessica  8,000 Units Intravenous After Dialysis German Linton MD      gabapentin  100 mg Oral Once per day on Monday Wednesday Friday Damian Cerda MD      lidocaine  1 patch Topical Daily PRN Damian Cerda MD      melatonin  6 mg Oral HS Jayden Rowland MD      methocarbamol  1,000 mg Oral Q8H [facility] Jayden Rowland MD      naloxone  0.04 mg Intravenous Q1MIN PRN Damian Cerda MD      NIFEdipine  30 mg Oral After Dinner Damian Cerda MD      oxyCODONE  5 mg Oral Q4H PRN Damian Cerda MD      Or    oxyCODONE  10 mg Oral Q4H PRN Damian Cerda MD      oxyCODONE  2.5 mg Oral Q8H PRN Jayden Rowland MD      polyethylene glycol  17 g Oral Daily PRN Michelle Landa, DO      senna  2 tablet Oral Daily With Lunch Michelle Landa, DO      sevelamer  1,600 mg Oral TID With Meals Damian Cerda MD      white petrolatum-mineral oil   Topical TID PRN Michelle Landa,            Lab Results: I have reviewed the following results:  Results from last 7 days   Lab Units 04/17/25  1248 04/15/25  0428 04/14/25  0611   HEMOGLOBIN g/dL 7.7* 7.2* 7.6*   HEMATOCRIT % 24.5* 22.8* 24.7*   WBC Thousand/uL 7.96 8.35 9.11   PLATELETS Thousands/uL 324 318 336     Results from last 7 days   Lab Units 04/17/25  1248 04/15/25  0447 04/14/25  0611   BUN mg/dL 47* 55* 39*   SODIUM mmol/L 131* 125* 129*   POTASSIUM mmol/L 4.2 4.7 4.7   CHLORIDE mmol/L 94* 89* 93*   CREATININE mg/dL 6.83* 7.54* 6.16*

## 2025-04-18 NOTE — ASSESSMENT & PLAN NOTE
- TTS at [facility]  - Access: Right IJ permacath  - EDW 97 kg, last post HD weight 96 kg  - Adjust EDW to 96.5 kg and challenge as tolerated  - Last HD: 04/17  - Next HD: 0/19

## 2025-04-19 ENCOUNTER — APPOINTMENT (INPATIENT)
Dept: DIALYSIS | Facility: HOSPITAL | Age: 65
DRG: 939 | End: 2025-04-19
Attending: INTERNAL MEDICINE
Payer: MEDICARE

## 2025-04-19 LAB
ANION GAP SERPL CALCULATED.3IONS-SCNC: 11 MMOL/L (ref 4–13)
BUN SERPL-MCNC: 37 MG/DL (ref 5–25)
CALCIUM SERPL-MCNC: 9.2 MG/DL (ref 8.4–10.2)
CHLORIDE SERPL-SCNC: 95 MMOL/L (ref 96–108)
CO2 SERPL-SCNC: 23 MMOL/L (ref 21–32)
CREAT SERPL-MCNC: 6.06 MG/DL (ref 0.6–1.3)
ERYTHROCYTE [DISTWIDTH] IN BLOOD BY AUTOMATED COUNT: 17.5 % (ref 11.6–15.1)
GFR SERPL CREATININE-BSD FRML MDRD: 8 ML/MIN/1.73SQ M
GLUCOSE SERPL-MCNC: 100 MG/DL (ref 65–140)
HCT VFR BLD AUTO: 26.1 % (ref 36.5–49.3)
HGB BLD-MCNC: 8.1 G/DL (ref 12–17)
MCH RBC QN AUTO: 29 PG (ref 26.8–34.3)
MCHC RBC AUTO-ENTMCNC: 31 G/DL (ref 31.4–37.4)
MCV RBC AUTO: 94 FL (ref 82–98)
PLATELET # BLD AUTO: 361 THOUSANDS/UL (ref 149–390)
PMV BLD AUTO: 9.3 FL (ref 8.9–12.7)
POTASSIUM SERPL-SCNC: 4.3 MMOL/L (ref 3.5–5.3)
RBC # BLD AUTO: 2.79 MILLION/UL (ref 3.88–5.62)
SODIUM SERPL-SCNC: 129 MMOL/L (ref 135–147)
WBC # BLD AUTO: 10.34 THOUSAND/UL (ref 4.31–10.16)

## 2025-04-19 PROCEDURE — 80048 BASIC METABOLIC PNL TOTAL CA: CPT | Performed by: INTERNAL MEDICINE

## 2025-04-19 PROCEDURE — 99232 SBSQ HOSP IP/OBS MODERATE 35: CPT | Performed by: PHYSICIAN ASSISTANT

## 2025-04-19 PROCEDURE — 90935 HEMODIALYSIS ONE EVALUATION: CPT | Performed by: INTERNAL MEDICINE

## 2025-04-19 PROCEDURE — 85027 COMPLETE CBC AUTOMATED: CPT | Performed by: INTERNAL MEDICINE

## 2025-04-19 RX ORDER — ONDANSETRON 4 MG/1
4 TABLET, ORALLY DISINTEGRATING ORAL EVERY 6 HOURS PRN
Status: DISCONTINUED | OUTPATIENT
Start: 2025-04-19 | End: 2025-05-07 | Stop reason: HOSPADM

## 2025-04-19 RX ORDER — POLYETHYLENE GLYCOL 3350 17 G/17G
17 POWDER, FOR SOLUTION ORAL DAILY
Status: COMPLETED | OUTPATIENT
Start: 2025-04-19 | End: 2025-04-20

## 2025-04-19 RX ORDER — POLYETHYLENE GLYCOL 3350 17 G/17G
17 POWDER, FOR SOLUTION ORAL ONCE
Status: DISCONTINUED | OUTPATIENT
Start: 2025-04-19 | End: 2025-04-19

## 2025-04-19 RX ADMIN — APIXABAN 5 MG: 5 TABLET, FILM COATED ORAL at 13:04

## 2025-04-19 RX ADMIN — ATORVASTATIN CALCIUM 80 MG: 80 TABLET, FILM COATED ORAL at 16:55

## 2025-04-19 RX ADMIN — METHOCARBAMOL 1000 MG: 500 TABLET ORAL at 06:20

## 2025-04-19 RX ADMIN — POLYETHYLENE GLYCOL 3350 17 G: 17 POWDER, FOR SOLUTION ORAL at 13:07

## 2025-04-19 RX ADMIN — EPOETIN ALFA 8000 UNITS: 4000 SOLUTION INTRAVENOUS; SUBCUTANEOUS at 11:58

## 2025-04-19 RX ADMIN — ACETAMINOPHEN 975 MG: 325 TABLET, FILM COATED ORAL at 13:04

## 2025-04-19 RX ADMIN — METHOCARBAMOL 1000 MG: 500 TABLET ORAL at 20:48

## 2025-04-19 RX ADMIN — NIFEDIPINE 30 MG: 30 TABLET, FILM COATED, EXTENDED RELEASE ORAL at 16:56

## 2025-04-19 RX ADMIN — DOCUSATE SODIUM 100 MG: 100 CAPSULE, LIQUID FILLED ORAL at 16:56

## 2025-04-19 RX ADMIN — SENNOSIDES 17.2 MG: 8.6 TABLET, FILM COATED ORAL at 13:04

## 2025-04-19 RX ADMIN — DOCUSATE SODIUM 100 MG: 100 CAPSULE, LIQUID FILLED ORAL at 13:04

## 2025-04-19 RX ADMIN — METHOCARBAMOL 1000 MG: 500 TABLET ORAL at 13:04

## 2025-04-19 RX ADMIN — OXYCODONE HYDROCHLORIDE 10 MG: 10 TABLET ORAL at 10:01

## 2025-04-19 RX ADMIN — ACETAMINOPHEN 975 MG: 325 TABLET, FILM COATED ORAL at 20:47

## 2025-04-19 RX ADMIN — ACETAMINOPHEN 975 MG: 325 TABLET, FILM COATED ORAL at 06:20

## 2025-04-19 RX ADMIN — SEVELAMER HYDROCHLORIDE 1600 MG: 800 TABLET ORAL at 16:57

## 2025-04-19 RX ADMIN — ALLOPURINOL 100 MG: 100 TABLET ORAL at 13:04

## 2025-04-19 RX ADMIN — Medication 6 MG: at 20:47

## 2025-04-19 RX ADMIN — APIXABAN 5 MG: 5 TABLET, FILM COATED ORAL at 16:56

## 2025-04-19 NOTE — ASSESSMENT & PLAN NOTE
- TTS at [facility]  - Access: Right IJ permacath  - EDW 97 kg,  - Adjust EDW to 96.5 kg and challenge as tolerated  - patient seen and examined by me on HD today  -next HD Tuesday 4/22

## 2025-04-19 NOTE — ASSESSMENT & PLAN NOTE
- Status post IR embolization  - Management per primary team  - As of 04/17 hemoglobin started to rise, continue to monitor  - If hemoglobin drops further may need further evaluation with CT scan  - last Hgb 8.1 today pre HD

## 2025-04-19 NOTE — NURSING NOTE
Pt awake most of the night. Refused to elevate legs to help with the edema. Kept standing up activating alarm , verbalizing will help with his legs. Was found walking to the bathroom and refused help at the time. On one occasion got up and was little confused, started asking for his room. Patient taking naps during the day but not sleeping enough at night.

## 2025-04-19 NOTE — ASSESSMENT & PLAN NOTE
Renal following- appreciate recommendations:  Check sodium level prior to dialysis on dialysis days  - Continue fluid restriction 1200 cc per 24 hours  - Continue 138 mEq sodium bath on dialysis today

## 2025-04-19 NOTE — ASSESSMENT & PLAN NOTE
- This is due to lack of free water clearance in ESRD  - sNa 129 today, downtrend noted  - Check sodium level prior to dialysis on dialysis days  - Continue fluid restriction 1200 cc per 24 hours  - Continue 138 mEq sodium bath on dialysis today  - Continue ultrafiltration as tolerated on HD

## 2025-04-19 NOTE — ASSESSMENT & PLAN NOTE
- Hemoglobin todd 7.2 on 04/15  - Hemoglobin improved to 8.1 today per HD  - Check CBC prior to dialysis on dialysis days  - Continue SURINDER with dialysis (increased to 8000 units q. dialysis this week)  - He was previously not on SURINDER  - Transfuse to keep more than 7

## 2025-04-19 NOTE — PROCEDURES
Procedure Note - Nephrology   Billwilfredo Carpio 64 y.o. male MRN: [id number]  Unit/Bed#: -01 Encounter: [id number]      Assessment / Plan:  Assessment & Plan  ESRD (end stage renal disease) (HCC)  - TTS at [facility]  - Access: Right IJ permacath  - EDW 97 kg,  - Adjust EDW to 96.5 kg and challenge as tolerated  - patient seen and examined by me on HD today  -next HD Tuesday 4/22  Primary hypertension  - Blood pressure is currently well-controlled on average  - Continue nifedipine XL 30mg daily, UF as tolerated on HD  Anemia due to chronic kidney disease, on chronic dialysis (HCC)  - Hemoglobin todd 7.2 on 04/15  - Hemoglobin improved to 8.1 today per HD  - Check CBC prior to dialysis on dialysis days  - Continue SURINDER with dialysis (increased to 8000 units q. dialysis this week)  - He was previously not on SURINDER  - Transfuse to keep more than 7  Hyperphosphatemia  - Phosphorus level 4.6 on 04/15  - Continue sevelamer 2 tabs TID with meals  - Check phosphorus level once a week while hospitalized  Chronic diastolic (congestive) heart failure (HCC)  - Ultrafiltration as tolerated on HD  - Monitor daily weight  Traumatic retroperitoneal hematoma  - Status post IR embolization  - Management per primary team  - As of 04/17 hemoglobin started to rise, continue to monitor  - If hemoglobin drops further may need further evaluation with CT scan  - last Hgb 8.1 today pre HD  Paroxysmal atrial fibrillation (HCC)  - Management per primary team  Diabetic ulcer of left midfoot associated with type 2 diabetes mellitus, limited to breakdown of skin (HCC)  - Management per primary team  Hyponatremia  - This is due to lack of free water clearance in ESRD  - sNa 129 today, downtrend noted  - Check sodium level prior to dialysis on dialysis days  - Continue fluid restriction 1200 cc per 24 hours  - Continue 138 mEq sodium bath on dialysis today  - Continue ultrafiltration as tolerated on HD          Subjective:  "  Patient seen and examined by me on HD at about 9am today. /63, UF goal 1L, qb 400ml/m, Qd 600ml/m.       Objective:     Vitals: Blood pressure 149/76, pulse 71, temperature 98 °F (36.7 °C), resp. rate 16, height 6' 1\" (1.854 m), weight 95.7 kg (210 lb 15.7 oz), SpO2 97%.,Body mass index is 27.84 kg/m².Temp (24hrs), Av.9 °F (36.6 °C), Min:97.6 °F (36.4 °C), Max:98.1 °F (36.7 °C)      Weight (last 2 days)       Date/Time Weight    25 0600 95.7 (210.98)    25 0600 94.9 (209.2)    25 0517 97 (213.8)              Intake/Output Summary (Last 24 hours) at 2025 1052  Last data filed at 2025 0824  Gross per 24 hour   Intake 520 ml   Output --   Net 520 ml     I/O last 24 hours:  In: 640 [P.O.:440; I.V.:200]  Out: -         Physical Exam:   Physical Exam  Vitals reviewed.   Constitutional:       General: He is not in acute distress.     Appearance: He is well-developed. He is not diaphoretic.      Comments: Frail appearing   HENT:      Head: Normocephalic and atraumatic.      Nose: Nose normal.      Mouth/Throat:      Pharynx: No oropharyngeal exudate.   Eyes:      General: No scleral icterus.        Right eye: No discharge.         Left eye: No discharge.   Neck:      Thyroid: No thyromegaly.   Cardiovascular:      Rate and Rhythm: Normal rate and regular rhythm.      Heart sounds: Normal heart sounds.   Pulmonary:      Effort: Pulmonary effort is normal.      Breath sounds: Normal breath sounds. No wheezing or rales.   Abdominal:      General: Bowel sounds are normal. There is no distension.      Palpations: Abdomen is soft.      Tenderness: There is no abdominal tenderness.   Musculoskeletal:         General: Swelling (b/l LE) present. Normal range of motion.      Cervical back: Neck supple.   Lymphadenopathy:      Cervical: No cervical adenopathy.   Skin:     General: Skin is warm and dry.      Coloration: Skin is pale. Skin is not jaundiced.      Findings: No rash.   Neurological: "      General: No focal deficit present.      Mental Status: He is alert.      Comments: awake   Psychiatric:         Mood and Affect: Mood normal.         Behavior: Behavior normal.         Invasive Devices       Hemodialysis Catheter  Duration             HD Permanent Double Catheter 598 days                    Medications:    Scheduled Meds:  Current Facility-Administered Medications   Medication Dose Route Frequency Provider Last Rate    acetaminophen  975 mg Oral Q8H [facility] Damian Cerda MD      allopurinol  100 mg Oral Daily Damian Cerda MD      apixaban  5 mg Oral BID Damian Cerda MD      atorvastatin  80 mg Oral Daily With Dinner Damian Cerda MD      bisacodyl  10 mg Rectal Daily PRN Michelle T Landa, DO      docusate sodium  100 mg Oral BID Michelle T Landa, DO      epoetin jessica  8,000 Units Intravenous After Dialysis German Linton MD      gabapentin  100 mg Oral Once per day on Monday Wednesday Friday Damian Cerda MD      lidocaine  1 patch Topical Daily PRN Damian Cerda MD      melatonin  6 mg Oral HS Jayden Rowland MD      methocarbamol  1,000 mg Oral Q8H [facility] Jayden Rowland MD      naloxone  0.04 mg Intravenous Q1MIN PRN Damian Cerda MD      NIFEdipine  30 mg Oral After Dinner Damian Cerda MD      oxyCODONE  5 mg Oral Q4H PRN Damian Cerda MD      Or    oxyCODONE  10 mg Oral Q4H PRN Damian Cerda MD      oxyCODONE  2.5 mg Oral Q8H PRN Jayden Rowland MD      polyethylene glycol  17 g Oral Daily PRN Michelle T Landa, DO      senna  2 tablet Oral Daily With Lunch Michelle T Landa, DO      sevelamer  1,600 mg Oral TID With Meals Damian Cerda MD      white petrolatum-mineral oil   Topical TID PRN Michelle T Landa, DO         PRN Meds:.  bisacodyl    epoetin jessica    lidocaine    naloxone    oxyCODONE **OR** oxyCODONE    oxyCODONE    polyethylene glycol    white petrolatum-mineral oil    Continuous Infusions:         LAB RESULTS:      Results from last 7 days   Lab Units 04/19/25  0818 04/17/25  7046  "04/15/25  0447 04/15/25  0428 04/14/25  0611 04/13/25  1307   WBC Thousand/uL 10.34* 7.96  --  8.35 9.11  --    HEMOGLOBIN g/dL 8.1* 7.7*  --  7.2* 7.6* 8.0*   HEMATOCRIT % 26.1* 24.5*  --  22.8* 24.7* 25.4*   PLATELETS Thousands/uL 361 324  --  318 336  --    POTASSIUM mmol/L 4.3 4.2 4.7  --  4.7  --    CHLORIDE mmol/L 95* 94* 89*  --  93*  --    CO2 mmol/L 23 25 24  --  26  --    BUN mg/dL 37* 47* 55*  --  39*  --    CREATININE mg/dL 6.06* 6.83* 7.54*  --  6.16*  --    CALCIUM mg/dL 9.2 9.2 8.9  --  9.1  --    PHOSPHORUS mg/dL  --   --  4.6*  --  4.2*  --        CUTURES:  Lab Results   Component Value Date    BLOODCX No Growth After 5 Days. 03/12/2024    BLOODCX No Growth After 5 Days. 03/12/2024    BLOODCX No Growth After 5 Days. 08/24/2023    BLOODCX No Growth After 5 Days. 08/24/2023    BLOODCX Enterobacter cloacae (A) 08/21/2023    BLOODCX Granulicatella adiacens (A) 08/21/2023    BLOODCX Proteus vulgaris (A) 08/21/2023    BLOODCX Alpha Hemolytic Streptococcus (A) 08/21/2023    BLOODCX Bacteroides fragilis (A) 08/21/2023    URINECX 30,000-39,000 cfu/ml 11/08/2024                 Portions of the record may have been created with voice recognition software. Occasional wrong word or \"sound a like\" substitutions may have occurred due to the inherent limitations of voice recognition software. Read the chart carefully and recognize, using context, where substitutions have occurred.If you have any questions, please contact the dictating provider.    "

## 2025-04-19 NOTE — ASSESSMENT & PLAN NOTE
Admitted to the trauma service after a fall with L2 vertebral body and retroperitoneal hematoma.  S/p IR embolization of distal right deep circumflex iliac artery/distal right superior gluteal artery both of which supplied an area of active extravasation and then pseudoaneurysms present at distal branches of the left inferior gluteal artery. The artery was embolized using coils and gelfoam.   Hemoglobin stable on 4/14 and on 4/17 had improved to 7.7  Will continue to watch  He was type and screened 4/17/25 in case needed blood during HD on that day.  Hgb today 8.1

## 2025-04-19 NOTE — PLAN OF CARE
Pt. On HD treatment for 4 hours with a UF goal of 1 L as tolerated. Pt on a 3 k 2.5 basim bath for a potassium of 4.2 on 04/17/25, labs pending.  Problem: METABOLIC, FLUID AND ELECTROLYTES - ADULT  Goal: Electrolytes maintained within normal limits  Description: INTERVENTIONS:- Monitor labs and assess patient for signs and symptoms of electrolyte imbalances- Administer electrolyte replacement as ordered- Monitor response to electrolyte replacements, including repeat lab results as appropriate- Instruct patient on fluid and nutrition as appropriate  Outcome: Progressing  Goal: Fluid balance maintained  Description: INTERVENTIONS:- Monitor labs - Monitor I/O and WT- Instruct patient on fluid and nutrition as appropriate- Assess for signs & symptoms of volume excess or deficit  Outcome: Progressing

## 2025-04-19 NOTE — ASSESSMENT & PLAN NOTE
- Phosphorus level 4.6 on 04/15  - Continue sevelamer 2 tabs TID with meals  - Check phosphorus level once a week while hospitalized

## 2025-04-19 NOTE — NURSING NOTE
For therapy purposes: pt is able to move his bedside table without difficulty. Alarm I still engaged to pt so visualizing pt using lights witches not seen. Pt can manage his own blankets, application & removal of LSO brace.

## 2025-04-19 NOTE — ASSESSMENT & PLAN NOTE
- Blood pressure is currently well-controlled on average  - Continue nifedipine XL 30mg daily, UF as tolerated on HD

## 2025-04-19 NOTE — ASSESSMENT & PLAN NOTE
Had 5 U PRBCs during hospital stay  Hemoglobin has been running mostly 7.3-7.4.    On 4/14/25 was 7.6; on 4/15/24 was 7.2 ---> Renal increased his Epogen in light of his hemoglobin  On 4/17, hemoglobin had increased to 7.7  4/19- Hgb up to 8.1. plan to recheck monday

## 2025-04-19 NOTE — PLAN OF CARE
Problem: PAIN - ADULT  Goal: Verbalizes/displays adequate comfort level or baseline comfort level  Description: Interventions:- Encourage patient to monitor pain and request assistance- Assess pain using appropriate pain scale- Administer analgesics based on type and severity of pain and evaluate response- Implement non-pharmacological measures as appropriate and evaluate response- Consider cultural and social influences on pain and pain management- Notify physician/advanced practitioner if interventions unsuccessful or patient reports new pain  Outcome: Progressing     Problem: INFECTION - ADULT  Goal: Absence or prevention of progression during hospitalization  Description: INTERVENTIONS:- Assess and monitor for signs and symptoms of infection- Monitor lab/diagnostic results- Monitor all insertion sites, i.e. indwelling lines, tubes, and drains- Monitor endotracheal if appropriate and nasal secretions for changes in amount and color- [place] appropriate cooling/warming therapies per order- Administer medications as ordered- Instruct and encourage patient and family to use good hand hygiene technique- Identify and instruct in appropriate isolation precautions for identified infection/condition  Outcome: Progressing  Goal: Absence of fever/infection during neutropenic period  Description: INTERVENTIONS:- Monitor WBC  Outcome: Progressing     Problem: SAFETY ADULT  Goal: Patient will remain free of falls  Description: INTERVENTIONS:- Educate patient/family on patient safety including physical limitations- Instruct patient to call for assistance with activity - Consult OT/PT to assist with strengthening/mobility - Keep Call bell within reach- Keep bed low and locked with side rails adjusted as appropriate- Keep care items and personal belongings within reach- Initiate and maintain comfort rounds- Make Fall Risk Sign visible to staff- Offer Toileting every  Hours, in advance of need- Initiate/Maintain alarm- Obtain  necessary fall risk management equipment: - Apply yellow socks and bracelet for high fall risk patients- Consider moving patient to room near nurses station  Outcome: Progressing  Goal: Maintain or return to baseline ADL function  Description: INTERVENTIONS:-  Assess patient's ability to carry out ADLs; assess patient's baseline for ADL function and identify physical deficits which impact ability to perform ADLs (bathing, care of mouth/teeth, toileting, grooming, dressing, etc.)- Assess/evaluate cause of self-care deficits - Assess range of motion- Assess patient's mobility; develop plan if impaired- Assess patient's need for assistive devices and provide as appropriate- Encourage maximum independence but intervene and supervise when necessary- Involve family in performance of ADLs- Assess for home care needs following discharge - Consider OT consult to assist with ADL evaluation and planning for discharge- Provide patient education as appropriate  Outcome: Progressing  Goal: Maintains/Returns to pre admission functional level  Description: INTERVENTIONS:- Perform AM-PAC 6 Click Basic Mobility/ Daily Activity assessment daily.- Set and communicate daily mobility goal to care team and patient/family/caregiver. - Collaborate with rehabilitation services on mobility goals if consulted- Perform Range of Motion  times a day.- Reposition patient every  hours.- Dangle patient  times a day- Stand patient  times a day- Ambulate patient  times a day- Out of bed to chair  times a day - Out of bed for meals  times a day- Out of bed for toileting- Record patient progress and toleration of activity level   Outcome: Progressing     Problem: DISCHARGE PLANNING  Goal: Discharge to home or other facility with appropriate resources  Description: INTERVENTIONS:- Identify barriers to discharge w/patient and caregiver- Arrange for needed discharge resources and transportation as appropriate- Identify discharge learning needs (meds, wound  care, etc.)- Arrange for interpretive services to assist at discharge as needed- Refer to Case Management Department for coordinating discharge planning if the patient needs post-hospital services based on physician/advanced practitioner order or complex needs related to functional status, cognitive ability, or social support system  Outcome: Progressing     Problem: Prexisting or High Potential for Compromised Skin Integrity  Goal: Skin integrity is maintained or improved  Description: INTERVENTIONS:- Identify patients at risk for skin breakdown- Assess and monitor skin integrity- Assess and monitor nutrition and hydration status- Monitor labs - Assess for incontinence - Turn and reposition patient- Assist with mobility/ambulation- Relieve pressure over bony prominences- Avoid friction and shearing- Provide appropriate hygiene as needed including keeping skin clean and dry- Evaluate need for skin moisturizer/barrier cream- Collaborate with interdisciplinary team - Patient/family teaching- Consider wound care consult   Outcome: Progressing     Problem: METABOLIC, FLUID AND ELECTROLYTES - ADULT  Goal: Electrolytes maintained within normal limits  Description: INTERVENTIONS:- Monitor labs and assess patient for signs and symptoms of electrolyte imbalances- Administer electrolyte replacement as ordered- Monitor response to electrolyte replacements, including repeat lab results as appropriate- Instruct patient on fluid and nutrition as appropriate  Outcome: Progressing  Goal: Fluid balance maintained  Description: INTERVENTIONS:- Monitor labs - Monitor I/O and WT- Instruct patient on fluid and nutrition as appropriate- Assess for signs & symptoms of volume excess or deficit  Outcome: Progressing     Problem: Nutrition/Hydration-ADULT  Goal: Nutrient/Hydration intake appropriate for improving, restoring or maintaining nutritional needs  Description: Monitor and assess patient's nutrition/hydration status for  malnutrition. Collaborate with interdisciplinary team and initiate plan and interventions as ordered.  Monitor patient's weight and dietary intake as ordered or per policy. Utilize nutrition screening tool and intervene as necessary. Determine patient's food preferences and provide high-protein, high-caloric foods as appropriate. INTERVENTIONS:- Monitor oral intake, urinary output, labs, and treatment plans- Assess nutrition and hydration status and recommend course of action- Evaluate amount of meals eaten- Assist patient with eating if necessary - Allow adequate time for meals- Recommend/ encourage appropriate diets, oral nutritional supplements, and vitamin/mineral supplements- Order, calculate, and assess calorie counts as needed- Recommend, monitor, and adjust tube feedings and TPN/PPN based on assessed needs- Assess need for intravenous fluids- Provide specific nutrition/hydration education as appropriate- Include patient/family/caregiver in decisions related to nutrition  Outcome: Progressing

## 2025-04-19 NOTE — PROGRESS NOTES
"Progress Note - Hospitalist   Name: Naresh Carpio 64 y.o. male I MRN: [id number]  Unit/Bed#: -01 I Date of Admission: 4/11/2025   Date of Service: 4/19/2025 I Hospital Day: 8    Assessment & Plan  Traumatic retroperitoneal hematoma  Admitted to the trauma service after a fall with L2 vertebral body and retroperitoneal hematoma.  S/p IR embolization of distal right deep circumflex iliac artery/distal right superior gluteal artery both of which supplied an area of active extravasation and then pseudoaneurysms present at distal branches of the left inferior gluteal artery. The artery was embolized using coils and gelfoam.   Hemoglobin stable on 4/14 and on 4/17 had improved to 7.7  Will continue to watch  He was type and screened 4/17/25 in case needed blood during HD on that day.  Hgb today 8.1  Anemia due to chronic kidney disease, on chronic dialysis (HCC)  Had 5 U PRBCs during hospital stay  Hemoglobin has been running mostly 7.3-7.4.    On 4/14/25 was 7.6; on 4/15/24 was 7.2 ---> Renal increased his Epogen in light of his hemoglobin  On 4/17, hemoglobin had increased to 7.7  4/19- Hgb up to 8.1. plan to recheck monday  ESRD (end stage renal disease) ([facility])  Continue Sevelamer TID with meals  Renal following  HD T-TH-Sat while here in rehab  Paroxysmal atrial fibrillation (HCC)  On no rate control medications  Examines RRR  Anticoagulation with Eliquis as at home  Chronic diastolic (congestive) heart failure (HCC)  Appears euvolemic except for LE pitting edema which appears to be chronic  Renal diet  stable  Volume management via HD  Primary hypertension  Continue Procardia XL 30 mg qd  Tx per renal  Visual disturbance  Reported a right eye \"floater\" without associated pain earlier in his hospitalization which has resolved but continued to have blurry central vision.  Ophthalmology saw over the weekend = visual acuity without correction is 20/400 OU. Dx: Proliferative diabetic retinopathy with vitreous " "hemorrhage OU as well as cataracts.   Will need outpatient follow up immediately after discharge from rehab  Diabetic ulcer of left midfoot associated with type 2 diabetes mellitus, limited to breakdown of skin (HCC)  Continue local care  Being followed by WC  Last LEADS was 3/28/25 = 50-75% right prox polital and prox tibioperoneal trunk; >75% stenosis in prox popliteal artery on left  Was seen by VS in 12/2024 hospital stay and he declined any intervention at the time  Hyponatremia  Renal following- appreciate recommendations:  Check sodium level prior to dialysis on dialysis days  - Continue fluid restriction 1200 cc per 24 hours  - Continue 138 mEq sodium bath on dialysis today  Closed fracture of proximal end of left humerus with routine healing  Occurred from a fall in February and eval'd at the time of the injury  Was to continue NWB/sling and followup with Ortho but there was no followup done  Xray done 4/13/25 = \"Unchanged alignment of the chronic greater tuberosity avulsion fracture\".   Ortho saw here on 4/14 = WBAT; see Dr Cisneros 4 weeks  Gout  Continue allopurinol  L2 vertebral fracture (HCC)  CT CAP 4/2: Fracture of L2 vertebral body extending to the superior endplate  Continue LSO brace  Insomnia  Patient reports not sleeping well at night because he is awake worrying about his medical conditions  Discussed with PMR over the weekend and started melatonin    The above assessment and plan was reviewed and updated as determined by my evaluation of the patient on 4/19/2025.    History of Present Illness   Patient seen and examined. Patients overnight issues or events were reviewed with nursing staff. New or overnight issues include the following:   Patient seen sitting up in chair after dialysis today. He states he feels better today than he did after his last dialysis session. He still states he has trouble finding a comfortable position and he prefers sitting up in a chair    Review of " Systems    Objective :  Temp:  [97.6 °F (36.4 °C)-98.1 °F (36.7 °C)] 97.9 °F (36.6 °C)  HR:  [58-73] 66  BP: (126-161)/() 147/74  Resp:  [14-18] 18  SpO2:  [96 %-97 %] 96 %  O2 Device: None (Room air)    Invasive Devices       Hemodialysis Catheter  Duration             HD Permanent Double Catheter 598 days                    Physical Exam  General Appearance: NAD; pleasant  HEENT: PERRLA, conjuctiva normal; mucous membranes moist; face symmetrical  Neck:  Supple  Lungs: clear bilaterally, normal respiratory effort, no retractions, expiratory effort normal, on room air  CV: regular rate and rhythm, no murmurs rubs or gallops noted   ABD: soft non tender, +BS x4  EXT: DP pulses intact, no lymphadenopathy  Skin: normal turgor, normal texture, no rash  Psych: affect normal, mood normal  Neuro: AAOx3    The above physical exam was reviewed and updated as determined by my evaluation of the patient on 4/19/2025.      Lab Results: I have reviewed the following results:  Results from last 7 days   Lab Units 04/19/25  0818 04/17/25  1248   WBC Thousand/uL 10.34* 7.96   HEMOGLOBIN g/dL 8.1* 7.7*   HEMATOCRIT % 26.1* 24.5*   PLATELETS Thousands/uL 361 324     Results from last 7 days   Lab Units 04/19/25  0818 04/17/25  1248   SODIUM mmol/L 129* 131*   POTASSIUM mmol/L 4.3 4.2   CHLORIDE mmol/L 95* 94*   CO2 mmol/L 23 25   BUN mg/dL 37* 47*   CREATININE mg/dL 6.06* 6.83*   CALCIUM mg/dL 9.2 9.2                       Review of Scheduled Meds: Medications  reviewed and reconciled as needed  Current Facility-Administered Medications   Medication Dose Route Frequency Provider Last Rate    acetaminophen  975 mg Oral Q8H [facility] Damian Cerda MD      allopurinol  100 mg Oral Daily Damian Cerda MD      apixaban  5 mg Oral BID Damian Cerda MD      atorvastatin  80 mg Oral Daily With Dinner Damian Cerda MD      bisacodyl  10 mg Rectal Daily PRN Michelle T Landa, DO      docusate sodium  100 mg Oral BID Michelle T Landa, DO       epoetin jessica  8,000 Units Intravenous After Dialysis German Linton MD      gabapentin  100 mg Oral Once per day on Monday Wednesday Friday Damian Cerda MD      lidocaine  1 patch Topical Daily PRN Damian Cerda MD      melatonin  6 mg Oral HS Jayden Rowland MD      methocarbamol  1,000 mg Oral Q8H KEMAL Jayden Rowland MD      naloxone  0.04 mg Intravenous Q1MIN PRN Damian Cerda MD      NIFEdipine  30 mg Oral After Dinner Damian Cerda MD      oxyCODONE  5 mg Oral Q4H PRN Damian Cerda MD      Or    oxyCODONE  10 mg Oral Q4H PRN Damian Cerda MD      oxyCODONE  2.5 mg Oral Q8H PRN Jayden Rowland MD      polyethylene glycol  17 g Oral Daily PRN Michelle Landa, DO      polyethylene glycol  17 g Oral Daily Quique Adkins MD      senna  2 tablet Oral Daily With Lunch Michelle Landa, DO      sevelamer  1,600 mg Oral TID With Meals Damian Cerda MD      white petrolatum-mineral oil   Topical TID PRN Michelle Landa, DO         VTE Pharmacologic Prophylaxis: eliquis  Code Status: Level 1 - Full Code  Current Length of Stay: 8 day(s)    Administrative Statements     ** Please Note:  voice to text software may have been used in the creation of this document. Although proof errors in transcription or interpretation are a potential of such software**   Never smoker

## 2025-04-19 NOTE — HEMODIALYSIS
Post-Dialysis RN Treatment Note    Blood Pressure:  Pre 143/64 mm/Hg  Post 157/82 mmHg   EDW:  96.5 kg    Weight:  Pre 96.2 kg   Post 95.2 kg   Mode of weight measurement: Standing Scale   Volume Removed:  1000 ml    Treatment duration: 240 minutes    NS given:  No    Treatment shortened No   Medications given during Rx: Oxycodone 10 mg, Epogen 8000 units   Estimated Kt/V:  1.20   Access type: Permacath/TDC   Needle Gauge:  N/A   Access Issues: No    Report called to primary nurse:   Yes Meaghan TUCKER RN

## 2025-04-20 PROCEDURE — 97530 THERAPEUTIC ACTIVITIES: CPT

## 2025-04-20 PROCEDURE — 99232 SBSQ HOSP IP/OBS MODERATE 35: CPT | Performed by: PHYSICIAN ASSISTANT

## 2025-04-20 RX ADMIN — OXYCODONE HYDROCHLORIDE 10 MG: 10 TABLET ORAL at 21:41

## 2025-04-20 RX ADMIN — ONDANSETRON 4 MG: 4 TABLET, ORALLY DISINTEGRATING ORAL at 00:23

## 2025-04-20 RX ADMIN — ALLOPURINOL 100 MG: 100 TABLET ORAL at 08:05

## 2025-04-20 RX ADMIN — DOCUSATE SODIUM 100 MG: 100 CAPSULE, LIQUID FILLED ORAL at 17:09

## 2025-04-20 RX ADMIN — METHOCARBAMOL 1000 MG: 500 TABLET ORAL at 14:27

## 2025-04-20 RX ADMIN — SEVELAMER HYDROCHLORIDE 1600 MG: 800 TABLET ORAL at 08:05

## 2025-04-20 RX ADMIN — APIXABAN 5 MG: 5 TABLET, FILM COATED ORAL at 17:09

## 2025-04-20 RX ADMIN — SEVELAMER HYDROCHLORIDE 1600 MG: 800 TABLET ORAL at 17:09

## 2025-04-20 RX ADMIN — METHOCARBAMOL 1000 MG: 500 TABLET ORAL at 21:04

## 2025-04-20 RX ADMIN — APIXABAN 5 MG: 5 TABLET, FILM COATED ORAL at 08:05

## 2025-04-20 RX ADMIN — ACETAMINOPHEN 975 MG: 325 TABLET, FILM COATED ORAL at 14:27

## 2025-04-20 RX ADMIN — ACETAMINOPHEN 975 MG: 325 TABLET, FILM COATED ORAL at 05:46

## 2025-04-20 RX ADMIN — OXYCODONE HYDROCHLORIDE 5 MG: 5 TABLET ORAL at 11:42

## 2025-04-20 RX ADMIN — ACETAMINOPHEN 975 MG: 325 TABLET, FILM COATED ORAL at 21:04

## 2025-04-20 RX ADMIN — POLYETHYLENE GLYCOL 3350 17 G: 17 POWDER, FOR SOLUTION ORAL at 08:05

## 2025-04-20 RX ADMIN — METHOCARBAMOL 1000 MG: 500 TABLET ORAL at 05:46

## 2025-04-20 RX ADMIN — OXYCODONE HYDROCHLORIDE 5 MG: 5 TABLET ORAL at 16:07

## 2025-04-20 RX ADMIN — SENNOSIDES 17.2 MG: 8.6 TABLET, FILM COATED ORAL at 11:42

## 2025-04-20 RX ADMIN — SEVELAMER HYDROCHLORIDE 1600 MG: 800 TABLET ORAL at 11:42

## 2025-04-20 RX ADMIN — NIFEDIPINE 30 MG: 30 TABLET, FILM COATED, EXTENDED RELEASE ORAL at 17:09

## 2025-04-20 RX ADMIN — Medication 6 MG: at 21:04

## 2025-04-20 RX ADMIN — ATORVASTATIN CALCIUM 80 MG: 80 TABLET, FILM COATED ORAL at 16:06

## 2025-04-20 RX ADMIN — DOCUSATE SODIUM 100 MG: 100 CAPSULE, LIQUID FILLED ORAL at 08:05

## 2025-04-20 NOTE — QUICK NOTE
Patient is stable from a renal perspective. Plan for nephrology to see with next IHD Tuesday, 4/22. Please message nephrology role via epic secure with any questions/concerns.

## 2025-04-20 NOTE — ASSESSMENT & PLAN NOTE
Continue local care  Being followed by WC  Last LEADS was 3/28/25 = 50-75% right prox polital and prox tibioperoneal trunk; >75% stenosis in prox popliteal artery on left  Was seen by VS in 12/2024 hospital stay and he declined any intervention at the time   [facility]    PATIENT'S NAME: Beau Yu   ATTENDING PHYSICIAN: Alfredo Esucdero M.D. OPERATING PHYSICIAN: Alfredo Escudero M.D.    PATIENT ACCOUNT#:   [de-identified]    LOCATION:  [address]  MEDICAL RECORD #:   [id number] cauterization was applied to it with a Coblation unit. The patient did have a spur that was taken down off the septum. The uncinate process was taken down with a Geovanny elevator.   The bulla ethmoidalis and posterior ethmoid cavity were removed with the m

## 2025-04-20 NOTE — ASSESSMENT & PLAN NOTE
Renal following- appreciate recommendations:  Check sodium level prior to dialysis on dialysis days  - Continue fluid restriction 1200 cc per 24 hours  - Continue 138 mEq sodium bath on dialysis

## 2025-04-20 NOTE — PROGRESS NOTES
"OT TREATMENT       04/20/25 2400   Pain Assessment   Pain Assessment Tool 0-10   Pain Score 5   Pain Location/Orientation Location: Back   Pain Onset/Description Onset: Ongoing;Descriptor: Discomfort   Effect of Pain on Daily Activities therapy within tolerance   Patient's Stated Pain Goal No pain   Hospital Pain Intervention(s) Repositioned;Rest   Restrictions/Precautions   Precautions Bed/chair alarms;Cognitive;Fall Risk;Pain;Spinal precautions;Supervision on toilet/commode   Weight Bearing Restrictions No   RUE Weight Bearing Per Order WBAT   LUE Weight Bearing Per Order WBAT   RLE Weight Bearing Per Order WBAT   LLE Weight Bearing Per Order WBAT   ROM Restrictions Yes  (spinal precautions)   Braces or Orthoses LSO   Lifestyle   Autonomy \"I'm not walking enough as I should be, slows down my progress\"   Sit to Stand   Type of Assistance Needed Supervision   Physical Assistance Level No physical assistance   Comment RW   Sit to Stand CARE Score 4   Bed-Chair Transfer   Type of Assistance Needed Supervision   Physical Assistance Level No physical assistance   Comment RW   Chair/Bed-to-Chair Transfer CARE Score 4   Toileting Hygiene   Type of Assistance Needed Supervision   Physical Assistance Level No physical assistance   Comment use of urinal per pt request, in stance for CM   Toileting Hygiene CARE Score 4   Health Management   Health Management Pt agreeable to participate in medication list review and schedule creation to assist with med management at home. List of medications reviewed, along with dosages, purpose and frequency. List and schedule left in nightstand top drawer, pt educated on potential for need to update list before d/c, pt verbalized understanding.   Cognition   Overall Cognitive Status Impaired   Arousal/Participation Alert   Attention Attends with cues to redirect   Orientation Level Oriented X4   Memory Decreased recall of precautions   Following Commands Follows one step commands without " difficulty   Additional Activities   Additional Activities Other (Comment)   Additional Activities Comments Pt completed 2 sets x10 trials anterior trunk flexion in stance to roll out large therapy ball over mat with return to upright with static glute hold to strengthen posterior chain and improve dynamic standing balance. Pt noted with minimal hip flexion, poor carryover with verbal and tactile feedback.  In stance pt completed the following BUE therex/AAROM no AD 3 sets x10 trials: sh abduction, chest press with 2lb ball, sh flexion. Pt completed functional ambulation of household distances with use of RW x2 trials no bouts of LOB, Supervision.   Activity Tolerance   Activity Tolerance Patient tolerated treatment well   Assessment   Treatment Assessment Pt participated in skilled OT session with focus on endurance, strengthening and medication management. Pt tolerated treatment well, pt remained seated in recliner with all immediate needs met, call bell accessible. Pt will continue to benefit from skilled OT services to ensure safe discharge. Continue plan of care with focus on balance, strength and endurance.   Prognosis Good   Problem List Decreased strength;Decreased endurance;Impaired balance;Decreased mobility;Impaired judgement;Decreased safety awareness;Impaired vision;Pain;Orthopedic restrictions   Barriers to Discharge Inaccessible home environment;Decreased caregiver support   Plan   Treatment/Interventions ADL retraining;Functional transfer training;Therapeutic exercise;Endurance training;Patient/family training;Equipment eval/education;Bed mobility;Compensatory technique education;Continued evaluation   Progress Progressing toward goals   Discharge Recommendation   Rehab Resource Intensity Level, OT   (pending)   OT Therapy Minutes   OT Time In 0830   OT Time Out 0945   OT Total Time (minutes) 75   OT Mode of treatment - Individual (minutes) 75   OT Mode of treatment - Concurrent (minutes) 0   OT Mode  of treatment - Group (minutes) 0   OT Mode of treatment - Co-treat (minutes) 0   OT Mode of Treatment - Total time(minutes) 75 minutes   OT Cumulative Minutes 549   Therapy Time missed   Time missed? No

## 2025-04-20 NOTE — PLAN OF CARE
Problem: PAIN - ADULT  Goal: Verbalizes/displays adequate comfort level or baseline comfort level  Description: Interventions:- Encourage patient to monitor pain and request assistance- Assess pain using appropriate pain scale- Administer analgesics based on type and severity of pain and evaluate response- Implement non-pharmacological measures as appropriate and evaluate response- Consider cultural and social influences on pain and pain management- Notify physician/advanced practitioner if interventions unsuccessful or patient reports new pain  Outcome: Progressing     Problem: INFECTION - ADULT  Goal: Absence or prevention of progression during hospitalization  Description: INTERVENTIONS:- Assess and monitor for signs and symptoms of infection- Monitor lab/diagnostic results- Monitor all insertion sites, i.e. indwelling lines, tubes, and drains- Monitor endotracheal if appropriate and nasal secretions for changes in amount and color- [place] appropriate cooling/warming therapies per order- Administer medications as ordered- Instruct and encourage patient and family to use good hand hygiene technique- Identify and instruct in appropriate isolation precautions for identified infection/condition  Outcome: Progressing  Goal: Absence of fever/infection during neutropenic period  Description: INTERVENTIONS:- Monitor WBC  Outcome: Progressing     Problem: SAFETY ADULT  Goal: Patient will remain free of falls  Description: INTERVENTIONS:- Educate patient/family on patient safety including physical limitations- Instruct patient to call for assistance with activity - Consult OT/PT to assist with strengthening/mobility - Keep Call bell within reach- Keep bed low and locked with side rails adjusted as appropriate- Keep care items and personal belongings within reach- Initiate and maintain comfort rounds- Make Fall Risk Sign visible to staff- Offer Toileting every  Hours, in advance of need- Initiate/Maintain alarm- Obtain  necessary fall risk management equipment: - Apply yellow socks and bracelet for high fall risk patients- Consider moving patient to room near nurses station  Outcome: Progressing  Goal: Maintain or return to baseline ADL function  Description: INTERVENTIONS:-  Assess patient's ability to carry out ADLs; assess patient's baseline for ADL function and identify physical deficits which impact ability to perform ADLs (bathing, care of mouth/teeth, toileting, grooming, dressing, etc.)- Assess/evaluate cause of self-care deficits - Assess range of motion- Assess patient's mobility; develop plan if impaired- Assess patient's need for assistive devices and provide as appropriate- Encourage maximum independence but intervene and supervise when necessary- Involve family in performance of ADLs- Assess for home care needs following discharge - Consider OT consult to assist with ADL evaluation and planning for discharge- Provide patient education as appropriate  Outcome: Progressing  Goal: Maintains/Returns to pre admission functional level  Description: INTERVENTIONS:- Perform AM-PAC 6 Click Basic Mobility/ Daily Activity assessment daily.- Set and communicate daily mobility goal to care team and patient/family/caregiver. - Collaborate with rehabilitation services on mobility goals if consulted- Perform Range of Motion  times a day.- Reposition patient every  hours.- Dangle patient  times a day- Stand patient  times a day- Ambulate patient  times a day- Out of bed to chair  times a day - Out of bed for meals  times a day- Out of bed for toileting- Record patient progress and toleration of activity level   Outcome: Progressing     Problem: DISCHARGE PLANNING  Goal: Discharge to home or other facility with appropriate resources  Description: INTERVENTIONS:- Identify barriers to discharge w/patient and caregiver- Arrange for needed discharge resources and transportation as appropriate- Identify discharge learning needs (meds, wound  care, etc.)- Arrange for interpretive services to assist at discharge as needed- Refer to Case Management Department for coordinating discharge planning if the patient needs post-hospital services based on physician/advanced practitioner order or complex needs related to functional status, cognitive ability, or social support system  Outcome: Progressing     Problem: Prexisting or High Potential for Compromised Skin Integrity  Goal: Skin integrity is maintained or improved  Description: INTERVENTIONS:- Identify patients at risk for skin breakdown- Assess and monitor skin integrity- Assess and monitor nutrition and hydration status- Monitor labs - Assess for incontinence - Turn and reposition patient- Assist with mobility/ambulation- Relieve pressure over bony prominences- Avoid friction and shearing- Provide appropriate hygiene as needed including keeping skin clean and dry- Evaluate need for skin moisturizer/barrier cream- Collaborate with interdisciplinary team - Patient/family teaching- Consider wound care consult   Outcome: Progressing     Problem: METABOLIC, FLUID AND ELECTROLYTES - ADULT  Goal: Electrolytes maintained within normal limits  Description: INTERVENTIONS:- Monitor labs and assess patient for signs and symptoms of electrolyte imbalances- Administer electrolyte replacement as ordered- Monitor response to electrolyte replacements, including repeat lab results as appropriate- Instruct patient on fluid and nutrition as appropriate  Outcome: Progressing  Goal: Fluid balance maintained  Description: INTERVENTIONS:- Monitor labs - Monitor I/O and WT- Instruct patient on fluid and nutrition as appropriate- Assess for signs & symptoms of volume excess or deficit  Outcome: Progressing     Problem: Nutrition/Hydration-ADULT  Goal: Nutrient/Hydration intake appropriate for improving, restoring or maintaining nutritional needs  Description: Monitor and assess patient's nutrition/hydration status for  malnutrition. Collaborate with interdisciplinary team and initiate plan and interventions as ordered.  Monitor patient's weight and dietary intake as ordered or per policy. Utilize nutrition screening tool and intervene as necessary. Determine patient's food preferences and provide high-protein, high-caloric foods as appropriate. INTERVENTIONS:- Monitor oral intake, urinary output, labs, and treatment plans- Assess nutrition and hydration status and recommend course of action- Evaluate amount of meals eaten- Assist patient with eating if necessary - Allow adequate time for meals- Recommend/ encourage appropriate diets, oral nutritional supplements, and vitamin/mineral supplements- Order, calculate, and assess calorie counts as needed- Recommend, monitor, and adjust tube feedings and TPN/PPN based on assessed needs- Assess need for intravenous fluids- Provide specific nutrition/hydration education as appropriate- Include patient/family/caregiver in decisions related to nutrition  Outcome: Progressing     Problem: Nutrition/Hydration-ADULT  Goal: Nutrient/Hydration intake appropriate for improving, restoring or maintaining nutritional needs  Description: Monitor and assess patient's nutrition/hydration status for malnutrition. Collaborate with interdisciplinary team and initiate plan and interventions as ordered.  Monitor patient's weight and dietary intake as ordered or per policy. Utilize nutrition screening tool and intervene as necessary. Determine patient's food preferences and provide high-protein, high-caloric foods as appropriate. INTERVENTIONS:- Monitor oral intake, urinary output, labs, and treatment plans- Assess nutrition and hydration status and recommend course of action- Evaluate amount of meals eaten- Assist patient with eating if necessary - Allow adequate time for meals- Recommend/ encourage appropriate diets, oral nutritional supplements, and vitamin/mineral supplements- Order, calculate, and assess  calorie counts as needed- Recommend, monitor, and adjust tube feedings and TPN/PPN based on assessed needs- Assess need for intravenous fluids- Provide specific nutrition/hydration education as appropriate- Include patient/family/caregiver in decisions related to nutrition  Outcome: Progressing

## 2025-04-20 NOTE — PROGRESS NOTES
"   04/20/25 1430   Pain Assessment   Pain Assessment Tool 0-10   Pain Score   (\"my normal back pain but its not bad\")   Pain Location/Orientation Orientation: Lower;Location: Back   Hospital Pain Intervention(s) Rest;Repositioned   Restrictions/Precautions   Precautions Bed/chair alarms;Cognitive;Fall Risk;Fluid restriction;Pain;Spinal precautions;Supervision on toilet/commode;Visual deficit   ROM Restrictions Yes  (spinal precautions)   Braces or Orthoses LSO   Cognition   Overall Cognitive Status Impaired   Arousal/Participation Alert;Cooperative  (initially refused, offered to come back in a little bit, pt stated \"lets just get it over with\")   Attention Attends with cues to redirect   Orientation Level Oriented X4   Memory Decreased recall of precautions   Following Commands Follows one step commands without difficulty   Subjective   Subjective \"Im so tired, I dont want to do this right now I thought I was done\"   Sit to Stand   Type of Assistance Needed Supervision;Adaptive equipment   Comment RW   Sit to Stand CARE Score 4   Bed-Chair Transfer   Type of Assistance Needed Supervision;Adaptive equipment   Comment RW   Chair/Bed-to-Chair Transfer CARE Score 4   Transfer Bed/Chair/Wheelchair   Limitations Noted In Balance;Endurance;LE Strength;Vision   Adaptive Equipment Roller Walker   Walk 10 Feet   Type of Assistance Needed Supervision;Adaptive equipment   Comment RW   Walk 10 Feet CARE Score 4   Walk 50 Feet with Two Turns   Type of Assistance Needed Supervision;Adaptive equipment   Comment RW   Walk 50 Feet with Two Turns CARE Score 4   Walk 150 Feet   Type of Assistance Needed Supervision;Adaptive equipment   Comment RW   Walk 150 Feet CARE Score 4   Ambulation   Primary Mode of Locomotion Prior to Admission Walk   Distance Walked (feet) 238 ft  (x1; 210x1; 180x1; seated recuperative rest breaks inbetweem bouts)   Assist Device Roller Walker   Gait Pattern Inconsistant Lisa;Slow Lisa;Decreased foot " "clearance;Wide GARO;Step through;Improper weight shift;Forward Flexion   Limitations Noted In Endurance;Balance;Device Management;Heel Strike;Speed;Strength   Provided Assistance with: Direction   Walk Assist Level Supervision   Findings VC's to remain close to RW and for obstacles in hallway   Does the patient walk? 2. Yes   Wheelchair mobility   Does the patient use a wheelchair? 0. No   Assessment   Treatment Assessment Pt participated in skilled PT session with focus on endurance training completed with ambulation training. Pt initially refused and said he was too tired, PT offered to come back in a little bit so he could wake up a little, but pt requested \"no don't leave lets just get this over with.\" He repoerted he understood why we do things the way we do at the [facility], but he feels everytime he closes his eyes he gets woken up to do something. Explained time spent in therapy necessity. With conversation distraction pt mood improved and he was thankful to get moving. Overall his ambulation is improving with distance and less assistance required. He continues to require VC to stay close to the RW and for obstacles in the hallway to avoid. Continue to problem solve with patient potential scanning techniques he can do while ambulating to remain safe with ambulation upon discharge. Continue per PT POC to address LE strength, balance and endurance deficits with repeitive task practice, TE and balance training. Pt requested to do STS at mat table next session.   Problem List Decreased strength;Decreased endurance;Impaired balance;Decreased mobility;Impaired judgement;Decreased safety awareness;Impaired vision;Pain;Orthopedic restrictions   Barriers to Discharge Inaccessible home environment;Decreased caregiver support   PT Barriers   Physical Impairment Decreased strength;Decreased range of motion;Decreased endurance;Impaired balance;Decreased mobility;Impaired vision   Functional Limitation Car " transfers;Transfers;Standing;Stair negotiation;Walking   Plan   Treatment/Interventions ADL retraining;Functional transfer training;LE strengthening/ROM;Elevations;Therapeutic exercise;Endurance training;Cognitive reorientation;Patient/family training;Equipment eval/education;Bed mobility;Gait training;Compensatory technique education   Progress Progressing toward goals   PT Therapy Minutes   PT Time In 1430   PT Time Out 1500   PT Total Time (minutes) 30   PT Mode of treatment - Individual (minutes) 30   PT Mode of treatment - Concurrent (minutes) 0   PT Mode of treatment - Group (minutes) 0   PT Mode of treatment - Co-treat (minutes) 0   PT Mode of Treatment - Total time(minutes) 30 minutes   PT Cumulative Minutes 540   Therapy Time missed   Time missed? No

## 2025-04-20 NOTE — NURSING NOTE
For therapy purposes, pt did require assist with picking up his blanket from the floor. He is able to move his bedside table independently.  Pt remains on A/GH so watching to manage his own light switches not observed..   full range of motion in all extremities

## 2025-04-20 NOTE — PROGRESS NOTES
"Progress Note - Hospitalist   Name: Naresh Carpio 64 y.o. male I MRN: [id number]  Unit/Bed#: -01 I Date of Admission: 4/11/2025   Date of Service: 4/20/2025 I Hospital Day: 9    Assessment & Plan  Traumatic retroperitoneal hematoma  Admitted to the trauma service after a fall with L2 vertebral body and retroperitoneal hematoma.  S/p IR embolization of distal right deep circumflex iliac artery/distal right superior gluteal artery both of which supplied an area of active extravasation and then pseudoaneurysms present at distal branches of the left inferior gluteal artery. The artery was embolized using coils and gelfoam.   Hemoglobin stable on 4/14 and on 4/17 had improved to 7.7  Will continue to watch  He was type and screened 4/17/25 in case needed blood during HD on that day.  Hgb 4/19 8.1  Anemia due to chronic kidney disease, on chronic dialysis ([facility])  Had 5 U PRBCs during hospital stay  Hemoglobin has been running mostly 7.3-7.4.    On 4/14/25 was 7.6; on 4/15/24 was 7.2 ---> Renal increased his Epogen in light of his hemoglobin  On 4/17, hemoglobin had increased to 7.7  4/19- Hgb up to 8.1. plan to recheck Tuesday before dialysis  ESRD (end stage renal disease) ([facility])  Continue Sevelamer TID with meals  Renal following  HD T-TH-Sat while here in rehab  Paroxysmal atrial fibrillation ([facility])  On no rate control medications  Examines RRR  Anticoagulation with Eliquis as at home  Chronic diastolic (congestive) heart failure ([facility])  Appears euvolemic except for LE pitting edema which appears to be chronic  Renal diet  stable  Volume management via HD  Primary hypertension  Continue Procardia XL 30 mg qd  Tx per renal  Visual disturbance  Reported a right eye \"floater\" without associated pain earlier in his hospitalization which has resolved but continued to have blurry central vision.  Ophthalmology saw over the weekend = visual acuity without correction is 20/400 OU. Dx: Proliferative diabetic retinopathy " "with vitreous hemorrhage OU as well as cataracts.   Will need outpatient follow up immediately after discharge from rehab  Diabetic ulcer of left midfoot associated with type 2 diabetes mellitus, limited to breakdown of skin (HCC)  Continue local care  Being followed by WC  Last LEADS was 3/28/25 = 50-75% right prox polital and prox tibioperoneal trunk; >75% stenosis in prox popliteal artery on left  Was seen by VS in 12/2024 hospital stay and he declined any intervention at the time  Hyponatremia  Renal following- appreciate recommendations:  Check sodium level prior to dialysis on dialysis days  - Continue fluid restriction 1200 cc per 24 hours  - Continue 138 mEq sodium bath on dialysis   Closed fracture of proximal end of left humerus with routine healing  Occurred from a fall in February and eval'd at the time of the injury  Was to continue NWB/sling and followup with Ortho but there was no followup done  Xray done 4/13/25 = \"Unchanged alignment of the chronic greater tuberosity avulsion fracture\".   Ortho saw here on 4/14 = WBAT; see Dr Cisneros 4 weeks  Gout  Continue allopurinol  L2 vertebral fracture (HCC)  CT CAP 4/2: Fracture of L2 vertebral body extending to the superior endplate  Continue LSO brace  Insomnia  Patient reports not sleeping well at night because he has trouble getting comfortable. He has been trying to sleep in the recliner     The above assessment and plan was reviewed and updated as determined by my evaluation of the patient on 4/20/2025.    History of Present Illness   Patient seen and examined. Patients overnight issues or events were reviewed with nursing staff. New or overnight issues include the following:   Patient sitting up in chair. Complains of feeling tired which is chronic. States he has back pain but he's \"getting used to it\". No CP/SOB    Review of Systems    Objective :  Temp:  [97.5 °F (36.4 °C)-98 °F (36.7 °C)] 98 °F (36.7 °C)  HR:  [62-73] 62  BP: (132-164)/() " 132/60  Resp:  [16-18] 16  SpO2:  [96 %-99 %] 98 %  O2 Device: None (Room air)    Invasive Devices       Hemodialysis Catheter  Duration             HD Permanent Double Catheter 599 days                    Physical Exam  General Appearance: NAD; pleasant  HEENT: PERRLA, conjuctiva normal; mucous membranes moist; face symmetrical  Neck:  Supple  Lungs: clear bilaterally, normal respiratory effort, no retractions, expiratory effort normal, on room air  CV: regular rate and rhythm, no murmurs rubs or gallops noted   ABD: soft non tender, +BS x4  EXT: Lower legs wrapped with Ace bandages  Skin: normal turgor, normal texture, no rash  Psych: affect normal, mood normal  Neuro: AAOx3    The above physical exam was reviewed and updated as determined by my evaluation of the patient on 4/20/2025.      Lab Results: I have reviewed the following results:  Results from last 7 days   Lab Units 04/19/25  0818 04/17/25  1248   WBC Thousand/uL 10.34* 7.96   HEMOGLOBIN g/dL 8.1* 7.7*   HEMATOCRIT % 26.1* 24.5*   PLATELETS Thousands/uL 361 324     Results from last 7 days   Lab Units 04/19/25  0818 04/17/25  1248   SODIUM mmol/L 129* 131*   POTASSIUM mmol/L 4.3 4.2   CHLORIDE mmol/L 95* 94*   CO2 mmol/L 23 25   BUN mg/dL 37* 47*   CREATININE mg/dL 6.06* 6.83*   CALCIUM mg/dL 9.2 9.2                       Review of Scheduled Meds: Medications  reviewed and reconciled as needed  Current Facility-Administered Medications   Medication Dose Route Frequency Provider Last Rate    acetaminophen  975 mg Oral Q8H [facility] Damian Cerda MD      allopurinol  100 mg Oral Daily Damian Cerda MD      apixaban  5 mg Oral BID Damian Cerda MD      atorvastatin  80 mg Oral Daily With Dinner Damian Cerda MD      bisacodyl  10 mg Rectal Daily PRN Michelle T Landa, DO      docusate sodium  100 mg Oral BID Michelle T Landa, DO      epoetin jessica  8,000 Units Intravenous After Dialysis German Linton MD      gabapentin  100 mg Oral Once per day on Monday  Wednesday Friday Damian Cerda MD      lidocaine  1 patch Topical Daily PRN Damian Cerda MD      melatonin  6 mg Oral HS aJyden Rowland MD      methocarbamol  1,000 mg Oral Q8H [facility] Jayden Rowland MD      naloxone  0.04 mg Intravenous Q1MIN PRN Damian Cerda MD      NIFEdipine  30 mg Oral After Dinner Damian Cerda MD      ondansetron  4 mg Oral Q6H PRN Gino Austin PA-C      oxyCODONE  5 mg Oral Q4H PRN Damian Cerda MD      Or    oxyCODONE  10 mg Oral Q4H PRN Damian Cerda MD      oxyCODONE  2.5 mg Oral Q8H PRN Jayden Rowland MD      polyethylene glycol  17 g Oral Daily PRN Michelle ABURTO Landa, DO      senna  2 tablet Oral Daily With Lunch Michelle Ngoel, DO      sevelamer  1,600 mg Oral TID With Meals Damian Cerda MD      white petrolatum-mineral oil   Topical TID PRN Michelle T Landa, DO         VTE Pharmacologic Prophylaxis: eliquis  Code Status: Level 1 - Full Code  Current Length of Stay: 9 day(s)    Administrative Statements     ** Please Note:  voice to text software may have been used in the creation of this document. Although proof errors in transcription or interpretation are a potential of such software**

## 2025-04-20 NOTE — ASSESSMENT & PLAN NOTE
Admitted to the trauma service after a fall with L2 vertebral body and retroperitoneal hematoma.  S/p IR embolization of distal right deep circumflex iliac artery/distal right superior gluteal artery both of which supplied an area of active extravasation and then pseudoaneurysms present at distal branches of the left inferior gluteal artery. The artery was embolized using coils and gelfoam.   Hemoglobin stable on 4/14 and on 4/17 had improved to 7.7  Will continue to watch  He was type and screened 4/17/25 in case needed blood during HD on that day.  Hgb 4/19 8.1

## 2025-04-20 NOTE — ASSESSMENT & PLAN NOTE
Had 5 U PRBCs during hospital stay  Hemoglobin has been running mostly 7.3-7.4.    On 4/14/25 was 7.6; on 4/15/24 was 7.2 ---> Renal increased his Epogen in light of his hemoglobin  On 4/17, hemoglobin had increased to 7.7  4/19- Hgb up to 8.1. plan to recheck Tuesday before dialysis

## 2025-04-20 NOTE — ASSESSMENT & PLAN NOTE
Patient reports not sleeping well at night because he has trouble getting comfortable. He has been trying to sleep in the recliner

## 2025-04-21 PROCEDURE — 97110 THERAPEUTIC EXERCISES: CPT

## 2025-04-21 PROCEDURE — 97530 THERAPEUTIC ACTIVITIES: CPT

## 2025-04-21 PROCEDURE — 99232 SBSQ HOSP IP/OBS MODERATE 35: CPT | Performed by: STUDENT IN AN ORGANIZED HEALTH CARE EDUCATION/TRAINING PROGRAM

## 2025-04-21 PROCEDURE — 99232 SBSQ HOSP IP/OBS MODERATE 35: CPT | Performed by: NURSE PRACTITIONER

## 2025-04-21 PROCEDURE — 97116 GAIT TRAINING THERAPY: CPT

## 2025-04-21 RX ADMIN — OXYCODONE HYDROCHLORIDE 10 MG: 10 TABLET ORAL at 07:30

## 2025-04-21 RX ADMIN — DOCUSATE SODIUM 100 MG: 100 CAPSULE, LIQUID FILLED ORAL at 16:34

## 2025-04-21 RX ADMIN — SENNOSIDES 17.2 MG: 8.6 TABLET, FILM COATED ORAL at 12:33

## 2025-04-21 RX ADMIN — OXYCODONE HYDROCHLORIDE 5 MG: 5 TABLET ORAL at 16:34

## 2025-04-21 RX ADMIN — DOCUSATE SODIUM 100 MG: 100 CAPSULE, LIQUID FILLED ORAL at 09:47

## 2025-04-21 RX ADMIN — METHOCARBAMOL 1000 MG: 500 TABLET ORAL at 05:20

## 2025-04-21 RX ADMIN — APIXABAN 5 MG: 5 TABLET, FILM COATED ORAL at 09:47

## 2025-04-21 RX ADMIN — SEVELAMER HYDROCHLORIDE 1600 MG: 800 TABLET ORAL at 09:46

## 2025-04-21 RX ADMIN — SEVELAMER HYDROCHLORIDE 1600 MG: 800 TABLET ORAL at 16:30

## 2025-04-21 RX ADMIN — Medication 6 MG: at 21:34

## 2025-04-21 RX ADMIN — METHOCARBAMOL 1000 MG: 500 TABLET ORAL at 14:26

## 2025-04-21 RX ADMIN — SEVELAMER HYDROCHLORIDE 1600 MG: 800 TABLET ORAL at 12:33

## 2025-04-21 RX ADMIN — ACETAMINOPHEN 975 MG: 325 TABLET, FILM COATED ORAL at 21:34

## 2025-04-21 RX ADMIN — OXYCODONE HYDROCHLORIDE 10 MG: 10 TABLET ORAL at 01:24

## 2025-04-21 RX ADMIN — ALLOPURINOL 100 MG: 100 TABLET ORAL at 09:46

## 2025-04-21 RX ADMIN — ATORVASTATIN CALCIUM 80 MG: 80 TABLET, FILM COATED ORAL at 16:33

## 2025-04-21 RX ADMIN — GABAPENTIN 100 MG: 100 CAPSULE ORAL at 09:47

## 2025-04-21 RX ADMIN — ACETAMINOPHEN 975 MG: 325 TABLET, FILM COATED ORAL at 05:20

## 2025-04-21 RX ADMIN — METHOCARBAMOL 1000 MG: 500 TABLET ORAL at 21:34

## 2025-04-21 RX ADMIN — ACETAMINOPHEN 975 MG: 325 TABLET, FILM COATED ORAL at 14:26

## 2025-04-21 RX ADMIN — NIFEDIPINE 30 MG: 30 TABLET, FILM COATED, EXTENDED RELEASE ORAL at 16:35

## 2025-04-21 NOTE — PROGRESS NOTES
"Progress Note - Hospitalist   Name: Naresh Carpio 64 y.o. male I MRN: [id number]  Unit/Bed#: -01 I Date of Admission: 4/11/2025   Date of Service: 4/21/2025 I Hospital Day: 10    Assessment & Plan  Traumatic retroperitoneal hematoma  Admitted to the trauma service after a fall with L2 vertebral body and retroperitoneal hematoma.  S/p IR embolization of distal right deep circumflex iliac artery/distal right superior gluteal artery both of which supplied an area of active extravasation and then pseudoaneurysms present at distal branches of the left inferior gluteal artery. The artery was embolized using coils and gelfoam.   Hemoglobin stable on 4/14 and on 4/17 had improved to 7.7  Will continue to watch  He was type and screened 4/17/25 in case needed blood during HD on that day.  Hgb 4/19 improved to 8.1  Anemia due to chronic kidney disease, on chronic dialysis ([facility])  Had 5 U PRBCs during hospital stay  Hemoglobin has been running mostly 7.3-7.4.    On 4/14/25 was 7.6; on 4/15/24 was 7.2 ---> Renal increased his Epogen in light of his hemoglobin  On 4/17, hemoglobin had increased to 7.7  4/19/25 Hgb up to 8.1.   CBC Tuesday 4/22/25  ESRD (end stage renal disease) ([facility])  Continue Sevelamer TID with meals  Renal following  HD T-TH-Sat while here in rehab  Paroxysmal atrial fibrillation (HCC)  On no rate control medications  Examines RRR  Anticoagulation with Eliquis as at home  Chronic diastolic (congestive) heart failure ([facility])  Appears euvolemic except for LE pitting edema which appears to be chronic  Renal diet  stable  Volume management via HD  Primary hypertension  Continue Procardia XL 30 mg qd  Tx per renal  Visual disturbance  Reported a right eye \"floater\" without associated pain earlier in his hospitalization which has resolved but continued to have blurry central vision.  Ophthalmology saw over the weekend = visual acuity without correction is 20/400 OU. Dx: Proliferative diabetic retinopathy with " "vitreous hemorrhage OU as well as cataracts.   Will need outpatient follow up immediately after discharge from rehab  Diabetic ulcer of left midfoot associated with type 2 diabetes mellitus, limited to breakdown of skin (HCC)  Continue local care  Being followed by WC  Last LEADS was 3/28/25 = 50-75% right prox polital and prox tibioperoneal trunk; >75% stenosis in prox popliteal artery on left  Was seen by VS in 12/2024 hospital stay and he declined any intervention at the time  Hyponatremia  Renal following- appreciate recommendations:  Check sodium level prior to dialysis on dialysis days  Continue fluid restriction 1200 cc per 24 hours  Continue 138 mEq sodium bath on dialysis   Closed fracture of proximal end of left humerus with routine healing  Occurred from a fall in February and eval'd at the time of the injury  Was to continue NWB/sling and followup with Ortho but there was no followup done  Xray 4/13/25 = \"Unchanged alignment of the chronic greater tuberosity avulsion fracture\".   Ortho saw here on 4/14 = [facility]AT; see Dr Cisneros 4 weeks  Gout  Continue Allopurinol  L2 vertebral fracture (HCC)  CT CAP 4/2: Fracture of L2 vertebral body extending to the superior endplate  Continue LSO brace  Insomnia  Patient reports not sleeping well at night because he has trouble getting comfortable. He has been trying to sleep in the recliner     The above assessment and plan was reviewed and updated as determined by my evaluation of the patient on 4/21/2025.    History of Present Illness   Patient seen and examined. Patients overnight issues or events were reviewed with nursing staff. New or overnight issues include the following:   No new or overnight issues.  Offers no complaints    Review of Systems   All other systems reviewed and are negative.      Objective :  Temp:  [97.3 °F (36.3 °C)-98.6 °F (37 °C)] 97.3 °F (36.3 °C)  HR:  [59-61] 59  BP: (158)/(49-70) 158/70  Resp:  [16-18] 18  SpO2:  [99 %-100 %] 99 %  O2 " Device: None (Room air)    Invasive Devices       Hemodialysis Catheter  Duration             HD Permanent Double Catheter 600 days                    Physical Exam  General Appearance: no distress, non toxic appearing  HEENT: PERRLA, conjuctiva normal; oropharynx clear; mucous membranes moist   Neck:  Supple, normal ROM  Lungs: CTA, normal respiratory effort, no retractions, expiratory effort normal  CV: regular rate and rhythm; no rubs/murmurs/gallops, PMI normal   ABD: soft; ND/NT; +BS  EXT: mild lower leg edema  Skin: normal turgor, normal texture  Psych: affect normal, mood normal  Neuro: AAO        The above physical exam was reviewed and updated as determined by my evaluation of the patient on 4/21/2025.      Lab Results: I have reviewed the following results:  Results from last 7 days   Lab Units 04/19/25  0818 04/17/25  1248   WBC Thousand/uL 10.34* 7.96   HEMOGLOBIN g/dL 8.1* 7.7*   HEMATOCRIT % 26.1* 24.5*   PLATELETS Thousands/uL 361 324     Results from last 7 days   Lab Units 04/19/25  0818 04/17/25  1248   SODIUM mmol/L 129* 131*   POTASSIUM mmol/L 4.3 4.2   CHLORIDE mmol/L 95* 94*   CO2 mmol/L 23 25   BUN mg/dL 37* 47*   CREATININE mg/dL 6.06* 6.83*   CALCIUM mg/dL 9.2 9.2                   Imaging Results Review: No pertinent imaging studies reviewed.  Other Study Results Review: No additional pertinent studies reviewed.    Review of Scheduled Meds: Medications  reviewed and reconciled as needed  Current Facility-Administered Medications   Medication Dose Route Frequency Provider Last Rate    acetaminophen  975 mg Oral Q8H [facility] Damian Cerda MD      allopurinol  100 mg Oral Daily Damian Cerda MD      apixaban  5 mg Oral BID Damian Cerda MD      atorvastatin  80 mg Oral Daily With Dinner Damian Cerda MD      bisacodyl  10 mg Rectal Daily PRN Michelle T Landa, DO      docusate sodium  100 mg Oral BID Michelle T Landa, DO      epoetin jessica  8,000 Units Intravenous After Dialysis German Linton MD       gabapentin  100 mg Oral Once per day on Monday Wednesday Friday Damian Cerda MD      lidocaine  1 patch Topical Daily PRN Damian Cerda MD      melatonin  6 mg Oral HS Jayden Rowland MD      methocarbamol  1,000 mg Oral Q8H [facility] Jayden Rowland MD      naloxone  0.04 mg Intravenous Q1MIN PRN Damian Cerda MD      NIFEdipine  30 mg Oral After Dinner Damian Cerda MD      ondansetron  4 mg Oral Q6H PRN Gino Austin PA-C      oxyCODONE  5 mg Oral Q4H PRN Damian Cerda MD      Or    oxyCODONE  10 mg Oral Q4H PRN Damian Cerda MD      oxyCODONE  2.5 mg Oral Q8H PRN Jayden Rowland MD      polyethylene glycol  17 g Oral Daily PRN Michelle T Landa, DO      senna  2 tablet Oral Daily With Lunch Michelle T Landa, DO      sevelamer  1,600 mg Oral TID With Meals Damian Cerda MD      white petrolatum-mineral oil   Topical TID PRN Michelle T Landa, DO         VTE Pharmacologic Prophylaxis: Eliquis  Code Status: Level 1 - Full Code  Current Length of Stay: 10 day(s)    Administrative Statements     ** Please Note:  voice to text software may have been used in the creation of this document. Although proof errors in transcription or interpretation are a potential of such software**

## 2025-04-21 NOTE — ASSESSMENT & PLAN NOTE
Current hemoglobin: 8.1 mg/dL  Treatment:  EPO  Transfuse for hemoglobin less than 7.0 per primary service

## 2025-04-21 NOTE — PROGRESS NOTES
04/21/25 1300   Pain Assessment   Pain Assessment Tool 0-10   Pain Score 2   Pain Location/Orientation Orientation: Lower;Location: Back   Restrictions/Precautions   Precautions Visual deficit;Supervision on toilet/commode;Fall Risk;Bed/chair alarms;Spinal precautions   Braces or Orthoses LSO   Subjective   Subjective worried about being discharged   Sit to Lying   Type of Assistance Needed Set-up / clean-up   Sit to Lying CARE Score 5   Lying to Sitting on Side of Bed   Type of Assistance Needed Set-up / clean-up   Lying to Sitting on Side of Bed CARE Score 5   Sit to Stand   Type of Assistance Needed Set-up / clean-up   Sit to Stand CARE Score 5   Bed-Chair Transfer   Type of Assistance Needed Set-up / clean-up   Chair/Bed-to-Chair Transfer CARE Score 5   Walk 10 Feet   Type of Assistance Needed Set-up / clean-up;Adaptive equipment   Comment RW   Walk 10 Feet CARE Score 5   Walk 50 Feet with Two Turns   Type of Assistance Needed Supervision;Adaptive equipment   Comment RW   Walk 50 Feet with Two Turns CARE Score 4   Walk 150 Feet   Type of Assistance Needed Supervision;Adaptive equipment   Comment RW   Walk 150 Feet CARE Score 4   Ambulation   Distance Walked (feet) 150 ft  (x3)   Curb or Single Stair   Style negotiated Curb   Type of Assistance Needed Incidental touching;Adaptive equipment   Comment CGA with RW   1 Step (Curb) CARE Score 4   Therapeutic Interventions   Strengthening standing hip flexion 4 x 10 B LE   Other Comments   Comments Pt speaking with CM at end of session regarding d/c planning. Pt worried about his vision.   Assessment   Treatment Assessment Pt seen agian this afternonn with focus on continued functional mobility and conditioning. Pt overall functioning at setup/supervision level with RW. No LOB with mobility. Tendency to push walker slightlyu ahead when getting fatigued. Recommend working to independence in room during the day.   PT Therapy Minutes   PT Time In 1300   PT Time Out  1400   PT Total Time (minutes) 60   PT Mode of treatment - Individual (minutes) 60   PT Mode of treatment - Concurrent (minutes) 0   PT Mode of treatment - Group (minutes) 0   PT Mode of treatment - Co-treat (minutes) 0   PT Mode of Treatment - Total time(minutes) 60 minutes   PT Cumulative Minutes 660   Therapy Time missed   Time missed? No

## 2025-04-21 NOTE — ASSESSMENT & PLAN NOTE
> 4/21- New sacral pressure injury noted by staff. Pictures reviewed on chart. Patient primarily sleeps in recliner (both here and at home). Discussed with therapy and pt will use a cushion that offers sacral relief on recliner. Will continue to monitor    - Moisturize skin daily with skin nourishing cream  - Ehob cushion in chair when out of bed.  - Preventative hydraguard to bilateral heels BID and PRN.   - Calazime paste to sacrum/buttocks BID and PRN  - Monitor clinically for breakdown, frequent turns  - Wound care RN consulted and following

## 2025-04-21 NOTE — PROGRESS NOTES
04/21/25 1000   Pain Assessment   Pain Assessment Tool 0-10   Pain Score 5   Pain Location/Orientation Orientation: Right;Orientation: Left;Location: Back   Restrictions/Precautions   Precautions Bed/chair alarms;Spinal precautions;Visual deficit;Supervision on toilet/commode   Braces or Orthoses LSO  (B lower legs ace wrapped for edema)   Subjective   Subjective tired from not sleeping well   Sit to Stand   Type of Assistance Needed Set-up / clean-up;Adaptive equipment   Comment RW   Sit to Stand CARE Score 5   Bed-Chair Transfer   Type of Assistance Needed Set-up / clean-up;Adaptive equipment   Comment RW   Chair/Bed-to-Chair Transfer CARE Score 5   Car Transfer   Type of Assistance Needed Supervision   Car Transfer CARE Score 4   Walk 10 Feet   Type of Assistance Needed Set-up / clean-up;Adaptive equipment   Comment RW   Walk 10 Feet CARE Score 5   Walk 50 Feet with Two Turns   Type of Assistance Needed Supervision;Adaptive equipment   Comment RW   Walk 50 Feet with Two Turns CARE Score 4   Walk 150 Feet   Type of Assistance Needed Supervision;Adaptive equipment   Comment RW   Walk 150 Feet CARE Score 4   Walking 10 Feet on Uneven Surfaces   Type of Assistance Needed Incidental touching;Adaptive equipment   Comment RW   Walking 10 Feet on Uneven Surfaces CARE Score 4   Ambulation   Distance Walked (feet) 150 ft  (150 x 5)   Assist Device Roller Walker   Gait Pattern Decreased foot clearance   Does the patient walk? 2. Yes   Wheelchair mobility   Does the patient use a wheelchair? 0. No   Curb or Single Stair   Style negotiated Curb   Type of Assistance Needed Physical assistance   Physical Assistance Level 25% or less   Comment Slight posterior lean, feels more fatigued today   1 Step (Curb) CARE Score 3   4 Steps   Type of Assistance Needed Incidental touching   Comment Practiced with BHR and HR and walker   4 Steps CARE Score 4   12 Steps   Reason if not Attempted Activity not applicable   12 Steps CARE  Score 9   Therapeutic Interventions   Strengthening LAQ 4 x 10, Seated hip flex 3 x 10   Flexibility Seated passive stretch B HS & calves   Assessment   Treatment Assessment Pt fatigued to start session, but cooperative and participating well. FOcused on wlaking with LE therx during seated breaks. Dewey distances today with decreased floor clearnace noted with fatigue, however no LOB noted. Cushion placed in recliner chair with sacral relief for pressure injury.  Pt again reporting he feels that his vision is the greatest problem.   PT Therapy Minutes   PT Time In 1000   PT Time Out 1100   PT Total Time (minutes) 60   PT Mode of treatment - Individual (minutes) 60   PT Mode of treatment - Concurrent (minutes) 0   PT Mode of treatment - Group (minutes) 0   PT Mode of treatment - Co-treat (minutes) 0   PT Mode of Treatment - Total time(minutes) 60 minutes   PT Cumulative Minutes 600   Therapy Time missed   Time missed? No

## 2025-04-21 NOTE — ASSESSMENT & PLAN NOTE
"- Reporting \"floater\" within the right eye with acute onset on 4/11/25 at mid-day while watching TV; describes a dark, \"spider\"-shaped floater that occupies a significant portion of the visual field, painless; has similar floaters in the left eye but smaller  - Ophthalmology consulted on [facility] admission by IM, per prior trauma surgery documentation, Dr. Chaudhry of ophthalmology was made personally aware of clinical situation on 4/11/25 prior to [facility] admission  > Ophthalmology consult 4/12: Exam c/w proliferative diabetic retinopathy with vitreous hemorrhage. Pt will follow up with ophtho immediately after discharge and for referral to a retina specialist  > 4/15- Pt thinks his R eye floater may be larger today, unable to clearly state or quantify symptoms. No other visual complaints of increased number of floaters, double/blurry vision, or bright flashes. Advised patient to inform us if symptoms change - will reach out to Ophthalmology accordingly  > 4/18- No changes noted in vision/floaters. His overall impaired vision continues to be a barrier for his ADLs/iADLs  > 4/21- Stable R eye floater    - Monitor for changes in vision  "

## 2025-04-21 NOTE — ASSESSMENT & PLAN NOTE
Had 5 U PRBCs during hospital stay  Hemoglobin has been running mostly 7.3-7.4.    On 4/14/25 was 7.6; on 4/15/24 was 7.2 ---> Renal increased his Epogen in light of his hemoglobin  On 4/17, hemoglobin had increased to 7.7  4/19/25 Hgb up to 8.1.   CBC Tuesday 4/22/25

## 2025-04-21 NOTE — ASSESSMENT & PLAN NOTE
> 4/14: Hgb 7.6  > 4/15: Hgb 7.2 - Discussed with IM who is coordinating with Nephrology; they will increase his Epoetin jessica  > 4/17: Hgb 7.7  > 4/19: Hgb 8.1    - Trend Hgb on CBC  - Epoetin jessica with dialysis  - Transfusions per IM, appreciate their recommendations

## 2025-04-21 NOTE — ASSESSMENT & PLAN NOTE
-- DO NOT REPLY / DO NOT REPLY ALL --  -- Message is from [employer] [facility] [employer] ([facility]) --    General Patient Message: Patient son calling in regards to wanting to schedule an Appt with Dr Cedeno . Patient  has upcoming Appt on 3-1-23. Son trying to get patient scheduled for same day. Being that he will be taking them back out of town by 3-3-23.Patient only wants to see Dr Cedeno. Please return call     Caller Information       Type Contact Phone/Fax    02/22/2023 08:52 AM CST Phone (Incoming) Rob Jones (Emergency Contact) [phone]        Alternative phone number: none    Can a detailed message be left? Yes    Message Turnaround:     Is it Working Hours? Yes - Working Hours     [place]:    Please give this turnaround time to the caller:   \"This message will be sent to [state Provider's name]. The clinical team will fulfill your request as soon as they review your message.\"                 Patient reports not sleeping well at night because he has trouble getting comfortable. He has been trying to sleep in the recliner

## 2025-04-21 NOTE — PLAN OF CARE
Problem: PAIN - ADULT  Goal: Verbalizes/displays adequate comfort level or baseline comfort level  Description: Interventions:- Encourage patient to monitor pain and request assistance- Assess pain using appropriate pain scale- Administer analgesics based on type and severity of pain and evaluate response- Implement non-pharmacological measures as appropriate and evaluate response- Consider cultural and social influences on pain and pain management- Notify physician/advanced practitioner if interventions unsuccessful or patient reports new pain  Outcome: Progressing     Problem: INFECTION - ADULT  Goal: Absence or prevention of progression during hospitalization  Description: INTERVENTIONS:- Assess and monitor for signs and symptoms of infection- Monitor lab/diagnostic results- Monitor all insertion sites, i.e. indwelling lines, tubes, and drains- Monitor endotracheal if appropriate and nasal secretions for changes in amount and color- Crawford appropriate cooling/warming therapies per order- Administer medications as ordered- Instruct and encourage patient and family to use good hand hygiene technique- Identify and instruct in appropriate isolation precautions for identified infection/condition  Outcome: Progressing  Goal: Absence of fever/infection during neutropenic period  Description: INTERVENTIONS:- Monitor WBC  Outcome: Progressing     Problem: SAFETY ADULT  Goal: Patient will remain free of falls  Description: INTERVENTIONS:- Educate patient/family on patient safety including physical limitations- Instruct patient to call for assistance with activity - Consult OT/PT to assist with strengthening/mobility - Keep Call bell within reach- Keep bed low and locked with side rails adjusted as appropriate- Keep care items and personal belongings within reach- Initiate and maintain comfort rounds- Make Fall Risk Sign visible to staff- Offer Toileting every 2 Hours, in advance of need- Initiate/Maintain bed/chair  alarm- Obtain necessary fall risk management equipment: non skid footwear- Apply yellow socks and bracelet for high fall risk patients- Consider moving patient to room near nurses station  Outcome: Progressing  Goal: Maintain or return to baseline ADL function  Description: INTERVENTIONS:-  Assess patient's ability to carry out ADLs; assess patient's baseline for ADL function and identify physical deficits which impact ability to perform ADLs (bathing, care of mouth/teeth, toileting, grooming, dressing, etc.)- Assess/evaluate cause of self-care deficits - Assess range of motion- Assess patient's mobility; develop plan if impaired- Assess patient's need for assistive devices and provide as appropriate- Encourage maximum independence but intervene and supervise when necessary- Involve family in performance of ADLs- Assess for home care needs following discharge - Consider OT consult to assist with ADL evaluation and planning for discharge- Provide patient education as appropriate  Outcome: Progressing  Goal: Maintains/Returns to pre admission functional level  Description: INTERVENTIONS:- Perform AM-PAC 6 Click Basic Mobility/ Daily Activity assessment daily.- Set and communicate daily mobility goal to care team and patient/family/caregiver. - Collaborate with rehabilitation services on mobility goals if consulted- Perform Range of Motion 3 times a day.- Reposition patient every 2 hours.- Dangle patient 3 times a day- Stand patient 3 times a day- Ambulate patient 3 times a day- Out of bed to chair 3 times a day - Out of bed for meals 3 times a day- Out of bed for toileting- Record patient progress and toleration of activity level   Outcome: Progressing     Problem: DISCHARGE PLANNING  Goal: Discharge to home or other facility with appropriate resources  Description: INTERVENTIONS:- Identify barriers to discharge w/patient and caregiver- Arrange for needed discharge resources and transportation as appropriate- Identify  discharge learning needs (meds, wound care, etc.)- Arrange for interpretive services to assist at discharge as needed- Refer to Case Management Department for coordinating discharge planning if the patient needs post-hospital services based on physician/advanced practitioner order or complex needs related to functional status, cognitive ability, or social support system  Outcome: Progressing     Problem: Prexisting or High Potential for Compromised Skin Integrity  Goal: Skin integrity is maintained or improved  Description: INTERVENTIONS:- Identify patients at risk for skin breakdown- Assess and monitor skin integrity- Assess and monitor nutrition and hydration status- Monitor labs - Assess for incontinence - Turn and reposition patient- Assist with mobility/ambulation- Relieve pressure over bony prominences- Avoid friction and shearing- Provide appropriate hygiene as needed including keeping skin clean and dry- Evaluate need for skin moisturizer/barrier cream- Collaborate with interdisciplinary team - Patient/family teaching- Consider wound care consult   Outcome: Progressing     Problem: METABOLIC, FLUID AND ELECTROLYTES - ADULT  Goal: Electrolytes maintained within normal limits  Description: INTERVENTIONS:- Monitor labs and assess patient for signs and symptoms of electrolyte imbalances- Administer electrolyte replacement as ordered- Monitor response to electrolyte replacements, including repeat lab results as appropriate- Instruct patient on fluid and nutrition as appropriate  Outcome: Progressing  Goal: Fluid balance maintained  Description: INTERVENTIONS:- Monitor labs - Monitor I/O and WT- Instruct patient on fluid and nutrition as appropriate- Assess for signs & symptoms of volume excess or deficit  Outcome: Progressing     Problem: Nutrition/Hydration-ADULT  Goal: Nutrient/Hydration intake appropriate for improving, restoring or maintaining nutritional needs  Description: Monitor and assess patient's  nutrition/hydration status for malnutrition. Collaborate with interdisciplinary team and initiate plan and interventions as ordered.  Monitor patient's weight and dietary intake as ordered or per policy. Utilize nutrition screening tool and intervene as necessary. Determine patient's food preferences and provide high-protein, high-caloric foods as appropriate. INTERVENTIONS:- Monitor oral intake, urinary output, labs, and treatment plans- Assess nutrition and hydration status and recommend course of action- Evaluate amount of meals eaten- Assist patient with eating if necessary - Allow adequate time for meals- Recommend/ encourage appropriate diets, oral nutritional supplements, and vitamin/mineral supplements- Order, calculate, and assess calorie counts as needed- Recommend, monitor, and adjust tube feedings and TPN/PPN based on assessed needs- Assess need for intravenous fluids- Provide specific nutrition/hydration education as appropriate- Include patient/family/caregiver in decisions related to nutrition  Outcome: Progressing     Problem: Nutrition/Hydration-ADULT  Goal: Nutrient/Hydration intake appropriate for improving, restoring or maintaining nutritional needs  Description: Monitor and assess patient's nutrition/hydration status for malnutrition. Collaborate with interdisciplinary team and initiate plan and interventions as ordered.  Monitor patient's weight and dietary intake as ordered or per policy. Utilize nutrition screening tool and intervene as necessary. Determine patient's food preferences and provide high-protein, high-caloric foods as appropriate. INTERVENTIONS:- Monitor oral intake, urinary output, labs, and treatment plans- Assess nutrition and hydration status and recommend course of action- Evaluate amount of meals eaten- Assist patient with eating if necessary - Allow adequate time for meals- Recommend/ encourage appropriate diets, oral nutritional supplements, and vitamin/mineral  supplements- Order, calculate, and assess calorie counts as needed- Recommend, monitor, and adjust tube feedings and TPN/PPN based on assessed needs- Assess need for intravenous fluids- Provide specific nutrition/hydration education as appropriate- Include patient/family/caregiver in decisions related to nutrition  Outcome: Progressing

## 2025-04-21 NOTE — ASSESSMENT & PLAN NOTE
- Sustained from mechanical fall several days prior to initial presentation on 4/2/25, presenting with progressively worsening low back pain. Trauma imaging revealed a large retroperitoneal hematoma with active extravasation and hemoglobin drop. Now s/p transfusion, Kcentra and ultimately IR embolization of the right deep circumflex iliac artery and superior gluteal artery with Dr. Garcia on 4/3/25. Unfortunately, on 4/6/25, his hemoglobin decreased and repeat CT showed a 5 mm inferior gluteal artery branch pseudoaneurysm and he is s/p IR pelvic angiogram on 4/7/25 with Dr. Hernandez, which demonstrated pseudoaneurysms at the distal branches of the left inferior gluteal artery which was embolized.   > 4/15: Hgb 7.2 today (from 7.6 yesterday). Back pain seems controlled. Will be receiving increased Epogen today per IM/Nephro  > Hgb 8.1 on 4/19 labs    - Continue to trend Hgb on CBC closely  - Consider repeat imaging if Hgb continues to downtrend or pain significantly worsens  - Contact IR if there are additional bleeding concerns given multiple prior interventions described above  - Local right hip/groin access site incision care  - Analgesia, see below  - PT and OT

## 2025-04-21 NOTE — ASSESSMENT & PLAN NOTE
Wt Readings from Last 3 Encounters:   04/21/25 94.9 kg (209 lb 3.2 oz)   04/11/25 98.5 kg (217 lb 2.5 oz)   03/24/25 107 kg (234 lb 12.6 oz)     - Most recent TTE from 10/9/24 with EF 60-65%, grade 1 diastolic dysfunction  - Volume management with HD per nephrology  - Monitor weights  - Appreciate medicine and nephrology recommendations and management

## 2025-04-21 NOTE — WOUND OSTOMY CARE
Progress Note - Wound   Naresh Carpio 64 y.o. male MRN: [id number]  Unit/Bed#: -01 Encounter: [id number]        Assessment:   Patient is seen for wound care follow-up.     Findings:  B/L heels are dry and howie.    1.Diabetic ulcers left foot and toes:  Partial thickness skin loss with pale yellow, tan, and pink tissue. Periwound is macerated. Recommend painting wound beds with Betadine daily and covering plantar open wound with foam.    2. Stage III sacral/buttocks- One small area of full thickness skin loss with yellow tissue present measured together with 2nd open area of pink tissue. Periwound skin is fragile with scant serosanguinous drainage and slight maceration noted. Recommend calcium alginate with silver and foam dressing.      No induration, fluctuance, odor, warmth/temperature differences, redness, or purulence noted to the above noted wounds and skin areas assessed. New dressings applied per orders listed below. Patient tolerated well- no s/s of non-verbal pain or discomfort observed during the encounter. Bedside nurse aware of plan of care. See flow sheets for more detailed assessment findings.      Orders listed below and wound care will continue to follow, call or Secure Chat Message with questions.       Skin Care Plan:  1.Left Plantar Foot Wounds and Left toes:  Cleanse with NSS and pat dry. Paint all wounds with Betadine daily and cover plantar open wound with silicone bordered foam dressing. Naren with T for Treatment and change every other day or PRN  2-Turn/reposition q2h or when medically stable for pressure re-distribution on skin.  3-Elevate heels to offload pressure.  4-Moisturize skin daily with skin nourishing cream  5-Ehob cushion in chair when out of bed.  6-Preventative Hydraguard to heels BID and PRN.   7-Sacrum-buttocks: Cleanse wound with NSS, pat dry. Apply calcium alginate with silver to wound bed and cover with foam. Change every other day and prn.      WOUNDS:  Wound  02/18/25 Diabetic Ulcer Toe D1, great Left;Medial (Active)   Wound Image   04/21/25 0814   Wound Description Dry;Dusky;Brown 04/21/25 0811   Wound Length (cm) 1.1 cm 04/21/25 0811   Wound Width (cm) 0.8 cm 04/21/25 0811   Wound Depth (cm) 0.1 cm 04/21/25 0811   Wound Surface Area (cm^2) 0.88 cm^2 04/21/25 0811   Wound Volume (cm^3) 0.088 cm^3 04/21/25 0811   Calculated Wound Volume (cm^3) 0.09 cm^3 04/21/25 0811   Change in Wound Size % 59.09 04/21/25 0811   Drainage Amount None 04/21/25 0811   Drainage Description Other (Comment) 04/21/25 0811   Liliana-wound Assessment Callus 04/21/25 0811   Treatments Site care;Other (Comment) 04/20/25 1330   Dressing Open to air 04/21/25 0811   Dressing Changed Other (Comment) 04/21/25 0811   Patient Tolerance Tolerated well 04/21/25 0811       Wound 03/26/25 Diabetic Ulcer Foot Left;Plantar (Active)   Wound Image   04/21/25 0813   Enter Delgadillo score: Delgadillo Grade 1: Partial or full-thickness ulcer (superficial) 04/14/25 1025   Wound Description Yellow;Tan;Dusky 04/21/25 0813   Non-staged Wound Description Partial thickness 04/21/25 0813   Wound Length (cm) 2 cm 04/21/25 0813   Wound Width (cm) 3.5 cm 04/21/25 0813   Wound Depth (cm) 0.2 cm 04/21/25 0813   Wound Surface Area (cm^2) 7 cm^2 04/21/25 0813   Wound Volume (cm^3) 1.4 cm^3 04/21/25 0813   Calculated Wound Volume (cm^3) 1.4 cm^3 04/21/25 0813   Drainage Amount Scant 04/21/25 0813   Drainage Description Serosanguineous 04/21/25 0813   Liliana-wound Assessment Fragile;Pink 04/21/25 0813   Treatments Site care;Cleansed 04/21/25 0813   Wound Site Closure None 04/21/25 0813   Dressing Foam 04/21/25 0813   Wound packed? No 04/21/25 0813   Dressing Changed Changed 04/21/25 0813   Patient Tolerance Tolerated well 04/21/25 0813   Dressing Status Clean;Dry;Intact 04/21/25 0813       Wound 04/09/25 Coccyx (Active)   Wound Image   04/21/25 0815   Wound Description Pink;Slough;White;Yellow 04/21/25 0815   Non-staged Wound Description Full  thickness 04/21/25 0815   Wound Length (cm) 6.5 cm 04/21/25 0815   Wound Width (cm) 4 cm 04/21/25 0815   Wound Depth (cm) 0.3 cm 04/21/25 0815   Wound Surface Area (cm^2) 26 cm^2 04/21/25 0815   Wound Volume (cm^3) 7.8 cm^3 04/21/25 0815   Calculated Wound Volume (cm^3) 7.8 cm^3 04/21/25 0815   Change in Wound Size % -34311.86 04/21/25 0815   Drainage Amount Small 04/21/25 0815   Drainage Description Serosanguineous 04/21/25 0815   Liliana-wound Assessment Fragile;Maceration;Pink;White 04/21/25 0815   Treatments Cleansed;Site care 04/21/25 0815   Wound Site Closure None 04/21/25 0815   Dressing Calcium Alginate with Silver 04/21/25 0815   Wound packed? No 04/21/25 0815   Dressing Changed New 04/21/25 0815   Patient Tolerance Tolerated well 04/21/25 0815   Dressing Status Clean;Dry;Intact 04/21/25 0815         Aster Coughlin BSN, RN, CWCN

## 2025-04-21 NOTE — PROGRESS NOTES
Progress Note - PMR   Name: Naresh Carpio 64 y.o. male I MRN: [id number]  Unit/Bed#: -01 I Date of Admission: 4/11/2025   Date of Service: 4/21/2025 I Hospital Day: 10     Assessment & Plan  Traumatic retroperitoneal hematoma  - Sustained from mechanical fall several days prior to initial presentation on 4/2/25, presenting with progressively worsening low back pain. Trauma imaging revealed a large retroperitoneal hematoma with active extravasation and hemoglobin drop. Now s/p transfusion, Kcentra and ultimately IR embolization of the right deep circumflex iliac artery and superior gluteal artery with Dr. Garcia on 4/3/25. Unfortunately, on 4/6/25, his hemoglobin decreased and repeat CT showed a 5 mm inferior gluteal artery branch pseudoaneurysm and he is s/p IR pelvic angiogram on 4/7/25 with Dr. Hernandez, which demonstrated pseudoaneurysms at the distal branches of the left inferior gluteal artery which was embolized.   > 4/15: Hgb 7.2 today (from 7.6 yesterday). Back pain seems controlled. Will be receiving increased Epogen today per IM/Nephro  > Hgb 8.1 on 4/19 labs    - Continue to trend Hgb on CBC closely  - Consider repeat imaging if Hgb continues to downtrend or pain significantly worsens  - Contact IR if there are additional bleeding concerns given multiple prior interventions described above  - Local right hip/groin access site incision care  - Analgesia, see below  - PT and OT  Acute pain due to trauma  - Evaluated by APS on acute care  - Tylenol 975 mg q8h scheduled  - Gabapentin 100 mg three times weekly  - Methocarbamol 750 mg q8h scheduled  - Lidocaine patches daily PRN  - Oxycodone 5 or 10 mg q4h PRN, wean as possible  Impaired mobility and activities of daily living  - Rehabilitation medicine physician for daily monitoring of care, 24 hour availability for acute medical issues, medication management, and therapeutic and diagnostic assessments.  - 24 hour rehabilitation nursing 7 days per week  for: management/teaching of medications, bowel/bladder routine, skin care.  - PT, OT for 2-3 hours per day, 5 to 6 days per week; 15 hours per week  - Rehabilitation Psychology as needed for adjustment and coping  - CM for barriers to discharge, community resources, and family support  - Discharge planning following to help ensure a safe and efficient discharge  - 900/7 program due to HD status, pain, expected therapy tolerance  4/18- Ongoing dispo planning with patient/sister and CM. Staff expressed that patient is progressing well from a functional standpoint but continues to have difficulties with ADLs/iADLs due to impaired vision. Will continue to work towards goals for safe dischare    L2 vertebral fracture (HCC)  > Sustained from a mechanical fall on 3/24/25, found to have an acute, obliquely oriented fracture of the L2 vertebral body with distraction of the dominant fracture fragment and involvement of the anterior and posterior cortices and superior endplate  > At that time, managed non-operatively with LSO bracing, though it does not appear orthopedic spine surgery or neurosurgery evaluated at the time of injury    - Maintain LSO brace with lumbar spinal precautions   - Should follow-up with spine surgery outpatient  Closed fracture of proximal end of left humerus with routine healing  - Sustained from fall in early February, per CT of the LUE on 2/9/25, there was a minimally displaced fracture of the anterior facet of the greater tuberosity of the left humerus, minimally apparent on repeat x-ray of the shoulder on 3/24/25  - He was evaluated by orthopedic surgery at the time of the initial injury and was recommended to maintain NWB through the LUE with a sling  - There is no further orthopedic evaluation documentation and it does not appear that further follow-up occurred.  - Clinically, he is not describing ongoing significant left arm or shoulder pain and has been weight-bearing through the LUE  > 4/14:  "Repeat humerus X-ray done yesterday shows no change in fracture alignment. Ortho consulted.  >4/15: Ortho c/s: May WBAT to LUE. PT/OT to increase strength and ROM. F/u with Dr. Cisneros in 4 weeks    - Monitor  - PT/OT  Insomnia  Patient reports he has an overall a poor sleep hygiene: he sleeps at 1-2am nightly and watches TV up until bed.  Advised patient to attempt to regulate sleep/wake cycle while here to better participate in therapy. Attempt to try to sleep earlier around midnight  >4/15: reviewed sleep log: Pt went to sleep around midnight and slept for 6 hours.  > 4/17- Poor sleep last night: was awake every other hour. Requiring nap during the day. Can increase melatonin tomorrow if this persists  > 4/18: Poor sleep continues. Pt endorses moving from bed to chair multiple times throughout the night which is his baseline at home too    - Continue sleep log  - Continue melatonin 6mg nightly  At risk for constipation   4/15- Adjusted bowel medications: Colace 100mg BID, Senna 17.2mg with lunch and PRN Miralax/Suppos  4/17- No BM since 4/13. Lactulose this evening if no BM after lunch. Can consider Relistor if no success for opioid-inducted constipation  4/18- small hard BMs x2 yesterday after lactulose and suppository. Continue bowel regimen  > Last BM 4/18    - Will follow BMs and adjust bowel regimen to target soft, formed stools q1-2 days, per pt's regular schedule.  Anemia due to chronic kidney disease, on chronic dialysis (HCC)  > 4/14: Hgb 7.6  > 4/15: Hgb 7.2 - Discussed with IM who is coordinating with Nephrology; they will increase his Epoetin jessica  > 4/17: Hgb 7.7  > 4/19: Hgb 8.1    - Trend Hgb on CBC  - Epoetin jessica with dialysis  - Transfusions per IM, appreciate their recommendations  Visual disturbance  - Reporting \"floater\" within the right eye with acute onset on 4/11/25 at mid-day while watching TV; describes a dark, \"spider\"-shaped floater that occupies a significant portion of the visual " "field, painless; has similar floaters in the left eye but smaller  - Ophthalmology consulted on [facility] admission by IM, per prior trauma surgery documentation, Dr. Chaudhry of ophthalmology was made personally aware of clinical situation on 4/11/25 prior to [facility] admission  > Ophthalmology consult 4/12: Exam c/w proliferative diabetic retinopathy with vitreous hemorrhage. Pt will follow up with ophtho immediately after discharge and for referral to a retina specialist  > 4/15- Pt thinks his R eye floater may be larger today, unable to clearly state or quantify symptoms. No other visual complaints of increased number of floaters, double/blurry vision, or bright flashes. Advised patient to inform us if symptoms change - will reach out to Ophthalmology accordingly  > 4/18- No changes noted in vision/floaters. His overall impaired vision continues to be a barrier for his ADLs/iADLs  > 4/21- Stable R eye floater    - Monitor for changes in vision  Diabetic ulcer of left midfoot associated with type 2 diabetes mellitus, limited to breakdown of skin (HCC)  - Has chronic wounds affecting the left foot plantar surface, left foot 1st and 2nd digits  - For left medial foot, left plantar surface: cleanse with NSS and pat dry. Apply dermagran to plantar wound only. Cover both wounds with silicone bordered foam dressings. Naren with T for Treatment and change every other day or PRN  - Pain left foot 1st and 2nd digits with betadine daily  - See \"at risk for skin breakdown\" for preventive care  - Should follow-up closely with podiatry in the outpatient setting after [facility] discharge, last outpatient documentation appears to be from 2023  Hyponatremia  > 4/14: Na 129 - A&O x3, pt mentating appropriately  > 4/15: Na 125 - Discussed with IM who is coordinating with Nephrology; they will manage Na during HD   > 4/17: Na 131  > 4/19: Na 129    - Trend on BMP  - Electrolyte management per nephrology  - Continue HD in setting of ESRD  ESRD (end " stage renal disease) (HCC)  - Continue HD  - Nephrology consulted and following, appreciate their recommendations  Paroxysmal atrial fibrillation (HCC)  - Currently rate controlled  - Embolic stroke risk reduction with apixaban 5 mg BID  Chronic diastolic (congestive) heart failure (HCC)  Wt Readings from Last 3 Encounters:   04/21/25 94.9 kg (209 lb 3.2 oz)   04/11/25 98.5 kg (217 lb 2.5 oz)   03/24/25 107 kg (234 lb 12.6 oz)     - Most recent TTE from 10/9/24 with EF 60-65%, grade 1 diastolic dysfunction  - Volume management with HD per nephrology  - Monitor weights  - Appreciate medicine and nephrology recommendations and management  Primary hypertension  - Nifedipine  - Appreciate IM and nephrology management  Gout  - Allopurinol for flare prophylaxis  Hyperphosphatemia  > 4/15: Phos 4.6  - Management per nephrology  - Continue phosphate binders  Wound of skin  - Has right anterior pre-tibial scab, wash gently daily, leave open to air  - Wound care RN consulted and following  At high risk for skin breakdown  > 4/21- New sacral pressure injury noted by staff. Pictures reviewed on chart. Patient primarily sleeps in recliner (both here and at home). Discussed with therapy and pt will use a cushion that offers sacral relief on recliner. Will continue to monitor    - Moisturize skin daily with skin nourishing cream  - Ehob cushion in chair when out of bed.  - Preventative hydraguard to bilateral heels BID and PRN.   - Calazime paste to sacrum/buttocks BID and PRN  - Monitor clinically for breakdown, frequent turns  - Wound care RN consulted and following  At risk for venous thromboembolism (VTE)  - Apixaban, see above    Subjective   64-year-old with past medical history of acute on chronic anemia, atrial fibrillation, 5 cigarettes, diabetes, end-stage renal disease on dialysis presenting on 4/2 with progressive worsening low back pain after falling and having a large left-sided retroperitoneal hematoma with active  extravasation initially treated with Kcentra and stabilized with transfusion and transferred to [facility] for IR evaluation embolization and completed on 4/they will continue to have decreasing hemoglobin found to have a 5 mm inferior gluteal artery branch pseudoaneurysm and did require an additional IR pelvic angiogram with ambulation Dacian on 4/7. Course complicated by acute pain     Chief Complaint: f/u ambulatory dysfunction    Interval: Patient seen and examined. No acute overnight events. Patient states his mood is down given his situation and he has anxiety about discharge. He continues to do well with functional mobility per PT this morning, but visual acuity is still his biggest barrier. Pt reports R eye floater is stable. Pain is controlled. Not sleeping well and still moves between chair and bed, primarily chair like he does at home. Nursing reports a new pressure injury to R sacrum. Will discuss options with therapy. Otherwise, eating % of meals. Last BM 4/18. No new labs to review today.      Objective :  Temp:  [97.3 °F (36.3 °C)-98.6 °F (37 °C)] 97.3 °F (36.3 °C)  HR:  [59-61] 59  BP: (158)/(49-70) 158/70  Resp:  [16-18] 18  SpO2:  [99 %-100 %] 99 %  O2 Device: None (Room air)    Functional Update:  Mobility: Sup for bed mobility.  Sup ambulation (238') with RW  Transfers: CGA for transfers  with RW  ADLs: CGA oral care, Ind eating, sup UBD, Rin bathing/LBD, maxA footwear, sup toileting    Physical Exam  Vitals reviewed.   Constitutional:       General: He is not in acute distress.     Appearance: Normal appearance.   HENT:      Head: Normocephalic and atraumatic.      Right Ear: External ear normal.      Left Ear: External ear normal.      Nose: Nose normal.   Eyes:      Conjunctiva/sclera: Conjunctivae normal.   Cardiovascular:      Rate and Rhythm: Normal rate and regular rhythm.      Heart sounds: Normal heart sounds.   Pulmonary:      Effort: Pulmonary effort is normal. No  respiratory distress.      Breath sounds: Normal breath sounds. No wheezing.   Abdominal:      General: Bowel sounds are normal. There is no distension.      Palpations: Abdomen is soft.   Musculoskeletal:         General: Normal range of motion.      Right lower leg: Edema present.      Left lower leg: Edema present.      Comments: +LSO. ACE wraps on bilateral LE's.   Neurological:      Mental Status: He is alert. Mental status is at baseline.   Psychiatric:      Comments: Flat mood         Scheduled Meds:  Current Facility-Administered Medications   Medication Dose Route Frequency Provider Last Rate    acetaminophen  975 mg Oral Q8H [facility] Damian Cerda MD      allopurinol  100 mg Oral Daily Damian Cerda MD      apixaban  5 mg Oral BID Damian Cerda MD      atorvastatin  80 mg Oral Daily With Dinner Damian Cerda MD      bisacodyl  10 mg Rectal Daily PRN Michelle T Landa, DO      docusate sodium  100 mg Oral BID Michelle T Landa, DO      epoetin jesisca  8,000 Units Intravenous After Dialysis German Linton MD      gabapentin  100 mg Oral Once per day on Monday Wednesday Friday Damian Cerda MD      lidocaine  1 patch Topical Daily PRN Damian Cerda MD      melatonin  6 mg Oral HS Jayden Rowland MD      methocarbamol  1,000 mg Oral Q8H [facility] Jayden Rowland MD      naloxone  0.04 mg Intravenous Q1MIN PRN Damian Cerda MD      NIFEdipine  30 mg Oral After Dinner Damian Cerda MD      ondansetron  4 mg Oral Q6H PRN Gino Austin PA-C      oxyCODONE  5 mg Oral Q4H PRN Damian Cerda MD      Or    oxyCODONE  10 mg Oral Q4H PRN Damian Cerda MD      oxyCODONE  2.5 mg Oral Q8H PRN Jayden Rowland MD      polyethylene glycol  17 g Oral Daily PRN Michelle T Landa, DO      senna  2 tablet Oral Daily With Lunch Michelle T Landa, DO      sevelamer  1,600 mg Oral TID With Meals Damian Cerda MD      white petrolatum-mineral oil   Topical TID PRN Michelle T Landa, DO           Lab Results: I have reviewed the following  results:  Results from last 7 days   Lab Units 04/19/25  0818 04/17/25  1248 04/15/25  0428   HEMOGLOBIN g/dL 8.1* 7.7* 7.2*   HEMATOCRIT % 26.1* 24.5* 22.8*   WBC Thousand/uL 10.34* 7.96 8.35   PLATELETS Thousands/uL 361 324 318     Results from last 7 days   Lab Units 04/19/25  0818 04/17/25  1248 04/15/25  0447   BUN mg/dL 37* 47* 55*   SODIUM mmol/L 129* 131* 125*   POTASSIUM mmol/L 4.3 4.2 4.7   CHLORIDE mmol/L 95* 94* 89*   CREATININE mg/dL 6.06* 6.83* 7.54*

## 2025-04-21 NOTE — PROGRESS NOTES
Progress Note - Nephrology   Name: Naresh Carpio 64 y.o. male I MRN: [id number]  Unit/Bed#: -01 I Date of Admission: 4/11/2025   Date of Service: 4/21/2025 I Hospital Day: 10     Assessment & Plan  ESRD (end stage renal disease) (HCC)  #ESRD on HD TTS:  Dialysis unit/days: [facility]  Access: PermCath  Plan to continue HD tomorrow as per schedule   Fluid restriction 1-1.5L/d  Adjust medications to GFR<10  Avoid opioids     Primary hypertension  Volume: Euvolemic  Blood pressure: Hypertensive, /70  Low-sodium diet  Nifedipine 30 mg daily  Anemia due to chronic kidney disease, on chronic dialysis (HCC)  Current hemoglobin: 8.1 mg/dL  Treatment:  EPO  Transfuse for hemoglobin less than 7.0 per primary service      Traumatic retroperitoneal hematoma  - Status post IR embolization  - Management per primary team  - As of 04/17 hemoglobin started to rise, continue to monitor  - If hemoglobin drops further may need further evaluation with CT scan    Secondary hyperparathyroidism (HCC)  Continue sevelamer with meals      I have reviewed the nephrology recommendations including hemodialysis tomorrow, with primary team, and we are in agreement with renal plan including the information outlined above. I have discussed the above management plan in detail with the primary service.     Subjective   Brief History of Admission -  65 yo man with PMH of ESRD on dialysis TTS admitted after a fall gated with hematoma.  Nephrology is consulted for management of ESRD     Patient on physical therapy, no shortness of breath    Objective :  Temp:  [97.3 °F (36.3 °C)-98.6 °F (37 °C)] 97.3 °F (36.3 °C)  HR:  [59-61] 59  BP: (158)/(49-70) 158/70  Resp:  [16-18] 18  SpO2:  [99 %-100 %] 99 %  O2 Device: None (Room air)    Current Weight: Weight - Scale: 94.9 kg (209 lb 3.2 oz)  First Weight: Weight - Scale: 98.9 kg (218 lb)  I/O         04/19 0701 04/20 0700 04/20 0701 04/21 0700 04/21 0701 04/22 0700    P.O. 680 300     I.V.  (mL/kg) 450 (4.7)      Total Intake(mL/kg) 1130 (11.8) 300 (3.2)     Urine (mL/kg/hr) 50 (0) 0 (0)     Other 1500      Total Output 1550 0     Net -420 +300                  Physical Exam  General:  no acute distress at this time  Skin:  No acute rash  Eyes:  No scleral icterus and noninjected  ENT:  mucous membranes moist  Neck:  no carotid bruits  Chest:  Clear to auscultation percussion, good respiratory effort, no use of accessory respiratory muscles  CVS:  Regular rate and rhythm without rub   Abdomen:  soft and nontender   Extremities: No significant lower extremity edema  Neuro:  No gross focality  Psych:  Alert , cooperative   Dialysis access: PermCath    Medications:    Current Facility-Administered Medications:     acetaminophen (TYLENOL) tablet 975 mg, 975 mg, Oral, Q8H KEMAL, Damian Cerda MD, 975 mg at 04/21/25 0520    allopurinol (ZYLOPRIM) tablet 100 mg, 100 mg, Oral, Daily, Damian Cerda MD, 100 mg at 04/21/25 0946    apixaban (ELIQUIS) tablet 5 mg, 5 mg, Oral, BID, Damian Cerda MD, 5 mg at 04/21/25 0947    atorvastatin (LIPITOR) tablet 80 mg, 80 mg, Oral, Daily With Dinner, Damian Cerda MD, 80 mg at 04/20/25 1606    bisacodyl (DULCOLAX) rectal suppository 10 mg, 10 mg, Rectal, Daily PRN, Michelle T Landa, DO, 10 mg at 04/18/25 0000    docusate sodium (COLACE) capsule 100 mg, 100 mg, Oral, BID, Michelle T Landa, DO, 100 mg at 04/21/25 0947    epoetin jessica (EPOGEN,PROCRIT) injection 8,000 Units, 8,000 Units, Intravenous, After Dialysis, German Linton MD, 8,000 Units at 04/19/25 1158    gabapentin (NEURONTIN) capsule 100 mg, 100 mg, Oral, Once per day on Monday Wednesday Friday, Damian Cerda MD, 100 mg at 04/21/25 0947    lidocaine (LIDODERM) 5 % patch 1 patch, 1 patch, Topical, Daily PRN, Damian Cerda MD, 1 patch at 04/18/25 2135    melatonin tablet 6 mg, 6 mg, Oral, HS, Jayden Rowland MD, 6 mg at 04/20/25 2104    methocarbamol (ROBAXIN) tablet 1,000 mg, 1,000 mg, Oral, Q8H [facility], Jayden DUEÑAS  "MD Kashif, 1,000 mg at 04/21/25 0520    naloxone (NARCAN) 0.04 mg/mL syringe 0.04 mg, 0.04 mg, Intravenous, Q1MIN PRN, Damian Cerda MD    NIFEdipine (PROCARDIA XL) 24 hr tablet 30 mg, 30 mg, Oral, After Dinner, Damian Cerda MD, 30 mg at 04/20/25 1709    ondansetron (ZOFRAN-ODT) dispersible tablet 4 mg, 4 mg, Oral, Q6H PRN, Gino Austin PA-C, 4 mg at 04/20/25 0023    oxyCODONE (ROXICODONE) IR tablet 5 mg, 5 mg, Oral, Q4H PRN, 5 mg at 04/20/25 1607 **OR** oxyCODONE (ROXICODONE) immediate release tablet 10 mg, 10 mg, Oral, Q4H PRN, Damian Cerda MD, 10 mg at 04/21/25 0730    oxyCODONE (ROXICODONE) split tablet 2.5 mg, 2.5 mg, Oral, Q8H PRN, Jayden Rowland MD    polyethylene glycol (MIRALAX) packet 17 g, 17 g, Oral, Daily PRN, Michelle Landa DO, 17 g at 04/17/25 0845    senna (SENOKOT) tablet 17.2 mg, 2 tablet, Oral, Daily With Lunch, Michelle Landa DO, 17.2 mg at 04/20/25 1142    sevelamer (RENAGEL) tablet 1,600 mg, 1,600 mg, Oral, TID With Meals, Damian Cerda MD, 1,600 mg at 04/21/25 0946    white petrolatum-mineral oil (EUCERIN,HYDROCERIN) cream, , Topical, TID PRN, Michelle Landa DO      Lab Results: I have reviewed the following results:  Results from last 7 days   Lab Units 04/19/25  0818 04/17/25  1248 04/15/25  0447 04/15/25  0428   WBC Thousand/uL 10.34* 7.96  --  8.35   HEMOGLOBIN g/dL 8.1* 7.7*  --  7.2*   HEMATOCRIT % 26.1* 24.5*  --  22.8*   PLATELETS Thousands/uL 361 324  --  318   POTASSIUM mmol/L 4.3 4.2 4.7  --    CHLORIDE mmol/L 95* 94* 89*  --    CO2 mmol/L 23 25 24  --    BUN mg/dL 37* 47* 55*  --    CREATININE mg/dL 6.06* 6.83* 7.54*  --    CALCIUM mg/dL 9.2 9.2 8.9  --    PHOSPHORUS mg/dL  --   --  4.6*  --        Administrative Statements     Portions of the record may have been created with voice recognition software. Occasional wrong word or \"sound a like\" substitutions may have occurred due to the inherent limitations of voice recognition software. Read the chart carefully " and recognize, using context, where substitutions have occurred.If you have any questions, please contact the dictating provider.

## 2025-04-21 NOTE — CASE MANAGEMENT
Cm met w/pt and discussed transportation. The address on file for [facility] is [facility]. Pt has been residing at his sisters address in [place], which is [facility].  Pt is not able to use the [employer] (equivalent to [employer] for dialysis as his dialysis center is in [facility]. [facility] if for [facility]. Pt stated his brother in law tried to get him to switch to [facility] dialysis center as its closer but at the time pt felt pressured to change. Pt lamented about his memories of happy times are in [place] and that's why he enjoys going back there. Cm explained unless he has assistance with transportation he needs to make a change either in his living status or dialysis location. CM confirmed with [employer] that they do not cross the county line.  Therefore pt cannot receive their services for transportation to [facility] or [facility] which is closest location to pts address in [place].

## 2025-04-21 NOTE — PROGRESS NOTES
"   04/21/25 0830   Pain Assessment   Pain Assessment Tool FLACC   Pain Rating: FLACC (Rest) - Face 1   Pain Rating: FLACC (Rest) - Legs 0   Pain Rating: FLACC (Rest) - Activity 1   Pain Rating: FLACC (Rest) - Cry 1   Pain Rating: FLACC (Rest) - Consolability 1   Score: FLACC (Rest) 4   Pain Rating: FLACC (Activity) - Face 1   Pain Rating: FLACC (Activity) - Legs 0   Pain Rating: FLACC (Activity) - Activity 1   Pain Rating: FLACC (Activity) - Cry 1   Pain Rating: FLACC (Activity) - Consolability 1   Score: FLACC (Activity) 4   Restrictions/Precautions   Precautions Bed/chair alarms;Cognitive;Fall Risk;Fluid restriction;Pain;Supervision on toilet/commode;Visual deficit;Spinal precautions   Braces or Orthoses LSO   Lifestyle   Autonomy \"If I get back to as good as I was before this I will be thrilled\"   Oral Hygiene   Type of Assistance Needed Incidental touching   Comment CGA in stance at sink to rinse mouth, pt stating he will brush teeth after lunch.   Oral Hygiene CARE Score 4   Sit to Stand   Type of Assistance Needed Incidental touching   Comment CGA RW   Sit to Stand CARE Score 4   Transfers   Additional Comments CGA RW for fxnl mobility w/in room and bathroom. Min cues overall for technique and 2' visual impairement   Exercise Tools   Exercise Tools Yes   UE Ergometer UE TE using Red tband for 3x10 in all available planes. Also completed several exercsies w/ 4# DB and good tolerance. Completed for inc IND w/ ADLs and mobility.   Cognition   Overall Cognitive Status Impaired   Arousal/Participation Alert;Cooperative   Attention Attends with cues to redirect   Orientation Level Oriented X4   Memory Decreased recall of precautions   Following Commands Follows one step commands without difficulty   Vision   Vision Comments Impaired   Activity Tolerance   Activity Tolerance Patient limited by fatigue   Assessment   Treatment Assessment OT session focusing on in room mobility and use of RW, oral hygiene tasks in " stance, UE TE. Pt aware of 2 one hour PT sessions later in the day which lead him to choose no additional walking activities in effort to safe his energy. Pt remains limited by back pain, dec activity tolerance, dec vision, dec balance, dec cog in addition to complex medical needs. OT to continue POC at this time.   Plan   Treatment/Interventions ADL retraining;Functional transfer training;Therapeutic exercise;Endurance training;Cognitive reorientation;Patient/family training;Equipment eval/education;Compensatory technique education;Continued evaluation   Progress Slow progress, decreased activity tolerance   OT Therapy Minutes   OT Time In 0830   OT Time Out 0930   OT Total Time (minutes) 60   OT Mode of treatment - Individual (minutes) 60   OT Mode of treatment - Concurrent (minutes) 0   OT Mode of treatment - Group (minutes) 0   OT Mode of treatment - Co-treat (minutes) 0   OT Mode of Treatment - Total time(minutes) 60 minutes   OT Cumulative Minutes 609   Therapy Time missed   Time missed? No

## 2025-04-21 NOTE — ASSESSMENT & PLAN NOTE
#ESRD on HD TTS:  Dialysis unit/days: [facility]  Access: PermCath  Plan to continue HD tomorrow as per schedule   Fluid restriction 1-1.5L/d  Adjust medications to GFR<10  Avoid opioids

## 2025-04-21 NOTE — ASSESSMENT & PLAN NOTE
Renal following- appreciate recommendations:  Check sodium level prior to dialysis on dialysis days  Continue fluid restriction 1200 cc per 24 hours  Continue 138 mEq sodium bath on dialysis

## 2025-04-21 NOTE — ASSESSMENT & PLAN NOTE
> 4/14: Na 129 - A&O x3, pt mentating appropriately  > 4/15: Na 125 - Discussed with IM who is coordinating with Nephrology; they will manage Na during HD   > 4/17: Na 131  > 4/19: Na 129    - Trend on BMP  - Electrolyte management per nephrology  - Continue HD in setting of ESRD

## 2025-04-21 NOTE — ASSESSMENT & PLAN NOTE
4/15- Adjusted bowel medications: Colace 100mg BID, Senna 17.2mg with lunch and PRN Miralax/Suppos  4/17- No BM since 4/13. Lactulose this evening if no BM after lunch. Can consider Relistor if no success for opioid-inducted constipation  4/18- small hard BMs x2 yesterday after lactulose and suppository. Continue bowel regimen  > Last BM 4/18    - Will follow BMs and adjust bowel regimen to target soft, formed stools q1-2 days, per pt's regular schedule.

## 2025-04-21 NOTE — ASSESSMENT & PLAN NOTE
Admitted to the trauma service after a fall with L2 vertebral body and retroperitoneal hematoma.  S/p IR embolization of distal right deep circumflex iliac artery/distal right superior gluteal artery both of which supplied an area of active extravasation and then pseudoaneurysms present at distal branches of the left inferior gluteal artery. The artery was embolized using coils and gelfoam.   Hemoglobin stable on 4/14 and on 4/17 had improved to 7.7  Will continue to watch  He was type and screened 4/17/25 in case needed blood during HD on that day.  Hgb 4/19 improved to 8.1

## 2025-04-21 NOTE — ASSESSMENT & PLAN NOTE
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   Ortho saw here on 4/14 = [facility]AT; see Dr Cisneros 4 weeks  "

## 2025-04-22 ENCOUNTER — APPOINTMENT (INPATIENT)
Dept: DIALYSIS | Facility: HOSPITAL | Age: 65
DRG: 939 | End: 2025-04-22
Attending: STUDENT IN AN ORGANIZED HEALTH CARE EDUCATION/TRAINING PROGRAM
Payer: MEDICARE

## 2025-04-22 ENCOUNTER — APPOINTMENT (INPATIENT)
Dept: NON INVASIVE DIAGNOSTICS | Facility: HOSPITAL | Age: 65
DRG: 939 | End: 2025-04-22
Payer: MEDICARE

## 2025-04-22 ENCOUNTER — APPOINTMENT (INPATIENT)
Dept: RADIOLOGY | Facility: HOSPITAL | Age: 65
DRG: 939 | End: 2025-04-22
Payer: MEDICARE

## 2025-04-22 LAB
ANION GAP SERPL CALCULATED.3IONS-SCNC: 11 MMOL/L (ref 4–13)
BASOPHILS # BLD AUTO: 0.05 THOUSANDS/ÂΜL (ref 0–0.1)
BASOPHILS NFR BLD AUTO: 1 % (ref 0–1)
BUN SERPL-MCNC: 48 MG/DL (ref 5–25)
CALCIUM SERPL-MCNC: 8.9 MG/DL (ref 8.4–10.2)
CHLORIDE SERPL-SCNC: 89 MMOL/L (ref 96–108)
CO2 SERPL-SCNC: 29 MMOL/L (ref 21–32)
CREAT SERPL-MCNC: 7.18 MG/DL (ref 0.6–1.3)
EOSINOPHIL # BLD AUTO: 0.35 THOUSAND/ÂΜL (ref 0–0.61)
EOSINOPHIL NFR BLD AUTO: 5 % (ref 0–6)
ERYTHROCYTE [DISTWIDTH] IN BLOOD BY AUTOMATED COUNT: 18.5 % (ref 11.6–15.1)
GFR SERPL CREATININE-BSD FRML MDRD: 7 ML/MIN/1.73SQ M
GLUCOSE SERPL-MCNC: 98 MG/DL (ref 65–140)
HCT VFR BLD AUTO: 26.4 % (ref 36.5–49.3)
HGB BLD-MCNC: 8.4 G/DL (ref 12–17)
IMM GRANULOCYTES # BLD AUTO: 0.13 THOUSAND/UL (ref 0–0.2)
IMM GRANULOCYTES NFR BLD AUTO: 2 % (ref 0–2)
LYMPHOCYTES # BLD AUTO: 1.29 THOUSANDS/ÂΜL (ref 0.6–4.47)
LYMPHOCYTES NFR BLD AUTO: 18 % (ref 14–44)
MCH RBC QN AUTO: 29.7 PG (ref 26.8–34.3)
MCHC RBC AUTO-ENTMCNC: 31.8 G/DL (ref 31.4–37.4)
MCV RBC AUTO: 93 FL (ref 82–98)
MONOCYTES # BLD AUTO: 0.8 THOUSAND/ÂΜL (ref 0.17–1.22)
MONOCYTES NFR BLD AUTO: 11 % (ref 4–12)
NEUTROPHILS # BLD AUTO: 4.65 THOUSANDS/ÂΜL (ref 1.85–7.62)
NEUTS SEG NFR BLD AUTO: 63 % (ref 43–75)
NRBC BLD AUTO-RTO: 0 /100 WBCS
PLATELET # BLD AUTO: 327 THOUSANDS/UL (ref 149–390)
PMV BLD AUTO: 8.9 FL (ref 8.9–12.7)
POTASSIUM SERPL-SCNC: 4.3 MMOL/L (ref 3.5–5.3)
RBC # BLD AUTO: 2.83 MILLION/UL (ref 3.88–5.62)
SODIUM SERPL-SCNC: 129 MMOL/L (ref 135–147)
WBC # BLD AUTO: 7.27 THOUSAND/UL (ref 4.31–10.16)

## 2025-04-22 PROCEDURE — 99232 SBSQ HOSP IP/OBS MODERATE 35: CPT | Performed by: NURSE PRACTITIONER

## 2025-04-22 PROCEDURE — 80048 BASIC METABOLIC PNL TOTAL CA: CPT | Performed by: INTERNAL MEDICINE

## 2025-04-22 PROCEDURE — 85025 COMPLETE CBC W/AUTO DIFF WBC: CPT | Performed by: INTERNAL MEDICINE

## 2025-04-22 PROCEDURE — 0JBR0ZZ EXCISION OF LEFT FOOT SUBCUTANEOUS TISSUE AND FASCIA, OPEN APPROACH: ICD-10-PCS | Performed by: PODIATRIST

## 2025-04-22 PROCEDURE — 99232 SBSQ HOSP IP/OBS MODERATE 35: CPT | Performed by: STUDENT IN AN ORGANIZED HEALTH CARE EDUCATION/TRAINING PROGRAM

## 2025-04-22 PROCEDURE — 97530 THERAPEUTIC ACTIVITIES: CPT

## 2025-04-22 PROCEDURE — 97110 THERAPEUTIC EXERCISES: CPT

## 2025-04-22 PROCEDURE — 99233 SBSQ HOSP IP/OBS HIGH 50: CPT | Performed by: STUDENT IN AN ORGANIZED HEALTH CARE EDUCATION/TRAINING PROGRAM

## 2025-04-22 PROCEDURE — 97535 SELF CARE MNGMENT TRAINING: CPT

## 2025-04-22 PROCEDURE — 73630 X-RAY EXAM OF FOOT: CPT

## 2025-04-22 PROCEDURE — 11042 DBRDMT SUBQ TIS 1ST 20SQCM/<: CPT | Performed by: PODIATRIST

## 2025-04-22 PROCEDURE — 99222 1ST HOSP IP/OBS MODERATE 55: CPT | Performed by: PODIATRIST

## 2025-04-22 RX ORDER — LACTULOSE 10 G/15ML
20 SOLUTION ORAL ONCE
Status: COMPLETED | OUTPATIENT
Start: 2025-04-22 | End: 2025-04-22

## 2025-04-22 RX ADMIN — ACETAMINOPHEN 975 MG: 325 TABLET, FILM COATED ORAL at 05:48

## 2025-04-22 RX ADMIN — ALLOPURINOL 100 MG: 100 TABLET ORAL at 08:35

## 2025-04-22 RX ADMIN — SEVELAMER HYDROCHLORIDE 1600 MG: 800 TABLET ORAL at 11:30

## 2025-04-22 RX ADMIN — OXYCODONE HYDROCHLORIDE 5 MG: 5 TABLET ORAL at 20:06

## 2025-04-22 RX ADMIN — Medication 6 MG: at 22:07

## 2025-04-22 RX ADMIN — OXYCODONE HYDROCHLORIDE 5 MG: 5 TABLET ORAL at 06:38

## 2025-04-22 RX ADMIN — Medication 2.5 MG: at 10:14

## 2025-04-22 RX ADMIN — METHOCARBAMOL 1000 MG: 500 TABLET ORAL at 05:48

## 2025-04-22 RX ADMIN — ATORVASTATIN CALCIUM 80 MG: 80 TABLET, FILM COATED ORAL at 17:15

## 2025-04-22 RX ADMIN — EPOETIN ALFA 8000 UNITS: 4000 SOLUTION INTRAVENOUS; SUBCUTANEOUS at 15:47

## 2025-04-22 RX ADMIN — OXYCODONE HYDROCHLORIDE 10 MG: 10 TABLET ORAL at 16:30

## 2025-04-22 RX ADMIN — NIFEDIPINE 30 MG: 30 TABLET, FILM COATED, EXTENDED RELEASE ORAL at 17:15

## 2025-04-22 RX ADMIN — METHYLNALTREXONE BROMIDE 6 MG: 12 INJECTION, SOLUTION SUBCUTANEOUS at 17:15

## 2025-04-22 RX ADMIN — DOCUSATE SODIUM 100 MG: 100 CAPSULE, LIQUID FILLED ORAL at 17:15

## 2025-04-22 RX ADMIN — OXYCODONE HYDROCHLORIDE 5 MG: 5 TABLET ORAL at 02:16

## 2025-04-22 RX ADMIN — METHOCARBAMOL 1000 MG: 500 TABLET ORAL at 13:00

## 2025-04-22 RX ADMIN — SEVELAMER HYDROCHLORIDE 1600 MG: 800 TABLET ORAL at 08:35

## 2025-04-22 RX ADMIN — DOCUSATE SODIUM 100 MG: 100 CAPSULE, LIQUID FILLED ORAL at 08:35

## 2025-04-22 RX ADMIN — LACTULOSE 20 G: 20 SOLUTION ORAL at 17:15

## 2025-04-22 RX ADMIN — SENNOSIDES 17.2 MG: 8.6 TABLET, FILM COATED ORAL at 11:30

## 2025-04-22 RX ADMIN — METHOCARBAMOL 1000 MG: 500 TABLET ORAL at 22:07

## 2025-04-22 RX ADMIN — SEVELAMER HYDROCHLORIDE 1600 MG: 800 TABLET ORAL at 17:15

## 2025-04-22 RX ADMIN — ACETAMINOPHEN 975 MG: 325 TABLET, FILM COATED ORAL at 22:07

## 2025-04-22 RX ADMIN — ACETAMINOPHEN 975 MG: 325 TABLET, FILM COATED ORAL at 13:00

## 2025-04-22 NOTE — ASSESSMENT & PLAN NOTE
4/15- Adjusted bowel medications: Colace 100mg BID, Senna 17.2mg with lunch and PRN Miralax/Suppos  4/17- No BM since 4/13. Lactulose this evening if no BM after lunch. Can consider Relistor if no success for opioid-inducted constipation  4/18- small hard BMs x2 yesterday after lactulose and suppository. Continue bowel regimen  > Last BM 4/18 -- will give Relistor x1 this afternoon. If no BM by late afternoon, nursing to give lactulose    - Will follow BMs and adjust bowel regimen to target soft, formed stools q1-2 days, per pt's regular schedule.

## 2025-04-22 NOTE — PROGRESS NOTES
04/22/25 1000   Pain Assessment   Pain Assessment Tool 0-10   Pain Score 8   Pain Location/Orientation Orientation: Lower;Location: Back   Pain Radiating Towards to both L and R sides   Pain Onset/Description Onset: Ongoing;Descriptor: Aching   Effect of Pain on Daily Activities therapy within tolerance   Patient's Stated Pain Goal No pain   Hospital Pain Intervention(s) Other (Comment);Rest;Repositioned  (RN Susan notified and she provided medication)   Restrictions/Precautions   Precautions Bed/chair alarms;Fall Risk;Pain;Spinal precautions;Supervision on toilet/commode;Visual deficit   ROM Restrictions Yes  (L/S spinal precautions)   Braces or Orthoses LSO   Cognition   Overall Cognitive Status Impaired   Arousal/Participation Alert;Cooperative   Attention Attends with cues to redirect   Orientation Level Oriented X4   Memory Decreased recall of precautions   Following Commands Follows one step commands without difficulty   Subjective   Subjective I have a lot of things on my mind please dont rush me to do anything   Sit to Stand   Type of Assistance Needed Set-up / clean-up;Adaptive equipment   Comment RW   Sit to Stand CARE Score 5   Bed-Chair Transfer   Type of Assistance Needed Set-up / clean-up;Adaptive equipment   Comment RW   Chair/Bed-to-Chair Transfer CARE Score 5   Transfer Bed/Chair/Wheelchair   Limitations Noted In Balance;Confidence;Endurance;Pain Management;LE Strength;Vision   Adaptive Equipment Roller Walker   Walk 10 Feet   Type of Assistance Needed Set-up / clean-up;Adaptive equipment   Comment RW   Walk 10 Feet CARE Score 5   Walk 50 Feet with Two Turns   Type of Assistance Needed Set-up / clean-up;Adaptive equipment   Comment RW   Walk 50 Feet with Two Turns CARE Score 5   Walk 150 Feet   Type of Assistance Needed Set-up / clean-up;Adaptive equipment   Comment RW   Walk 150 Feet CARE Score 5   Ambulation   Primary Mode of Locomotion Prior to Admission Walk   Assist Device Roller Walker  "  Gait Pattern Inconsistant Lisa;Slow Lisa;Decreased foot clearance;Forward Flexion;Wide GARO;Step through;Improper weight shift  (improved B foot clearance noted during todays ambulation trials)   Limitations Noted In Balance;Endurance;Device Management;Heel Strike;Speed;Strength  (device management due to vision; no VC provided today in anticipation for d/c and pt did not hit any obstacles demonstrating improvement in this area)   Walk Assist Level Distant Supervision   Does the patient walk? 2. Yes   Wheelchair mobility   Does the patient use a wheelchair? 0. No   Curb or Single Stair   Style negotiated Single stair  (6\")   Type of Assistance Needed Supervision   Comment  steps   1 Step (Curb) CARE Score 4   4 Steps   Type of Assistance Needed Supervision   Comment BUE support on BHR; 6\"  steps with non reciprocal pattern asc leading w LLE, desc leading w. LLE. as this is pt preference.   4 Steps CARE Score 4   Stairs   Type Stairs   # of Steps 4   Weight Bearing Precautions Back;Fall Risk   Assist Devices Bilateral Rail   Therapeutic Interventions   Strengthening STS at mat table: 14, 10, 10 reps. Seated therex in recliner at start of session due to inc pain: B ankle pumps, LAQ, marches, hip abd.   Assessment   Treatment Assessment Pt seen for skilled PT session today to focus on gait training, strength training and stair negotiation. Pt initially aggravated and did not want to participate in PT but with further discussion he verbalized that this was due to inc pain. CURLY Davis notified and she was able to provide pt additional medication. Started session with seated therex in recliner to get acclimated to movement and then did a long ambulation distance to \"loosen up.\" With conversation as distraction pt became more pleasant as the session went on and with autonomy provided to pick what he wanted to work on he was more willing to complete tasks. Pt continues to make gains in his ambulation " distance and is requiring DS with no LOB and no instances of nearing too close to the wall/objects in the hallway. Pt continues to express his worry about d/c and dialysis which we discussed today is stil being worked out. Continue per PT POC to address dec LE strength, balance and endurance with LE TE, repetitive task practice and IADL practice to achieve goals and increase safety.   Problem List Decreased strength;Decreased endurance;Impaired balance;Decreased mobility;Impaired judgement;Decreased safety awareness;Impaired vision;Pain;Orthopedic restrictions   Barriers to Discharge Inaccessible home environment;Decreased caregiver support   PT Barriers   Physical Impairment Decreased strength;Decreased range of motion;Decreased endurance;Impaired balance;Decreased mobility;Impaired vision   Functional Limitation Car transfers;Transfers;Standing;Stair negotiation;Walking   Plan   Treatment/Interventions ADL retraining;LE strengthening/ROM;Functional transfer training;Elevations;Therapeutic exercise;Endurance training;Cognitive reorientation;Patient/family training;Equipment eval/education;Bed mobility;Gait training;Compensatory technique education   Progress Progressing toward goals   PT Therapy Minutes   PT Time In 1000   PT Time Out 1130   PT Total Time (minutes) 90   PT Mode of treatment - Individual (minutes) 90   PT Mode of treatment - Concurrent (minutes) 0   PT Mode of treatment - Group (minutes) 0   PT Mode of treatment - Co-treat (minutes) 0   PT Mode of Treatment - Total time(minutes) 90 minutes   PT Cumulative Minutes 750   Therapy Time missed   Time missed? No

## 2025-04-22 NOTE — ASSESSMENT & PLAN NOTE
Had 5 U PRBCs during hospital stay  Hemoglobin has been running mostly 7.3-7.4.    On 4/14/25 was 7.6; on 4/15/24 was 7.2 ---> Renal increased his Epogen in light of his hemoglobin  On 4/17, hemoglobin had increased to 7.7  4/19/25 Hgb up to 8.1.   CBC Tuesday in HD 4/22/25 = pending

## 2025-04-22 NOTE — ASSESSMENT & PLAN NOTE
- Continue HD  - Nephrology consulted and following, appreciate their recommendations  >4/22: new outpatient HD center needed close to his home after discharge. CM to follow up. DC pushed

## 2025-04-22 NOTE — PLAN OF CARE
Pre-tx:  UF 2.5 L as tolerated to continue to challenge EDW.  BP stable to start HD>  Pt in recliner chair in HD room, refusing to put feet up during dialysis.  Will monitor BP.  Pt started on 3K bath per last serum K of 4.3 on 4/19/25.  Labs drawn prior to HD.        Post-Dialysis RN Treatment Note    Blood Pressure:  Pre 140/68 mm/Hg  Post 169/80 mmHg   EDW:  96.5 kg    Weight:  Pre 97 kg   Post 94.9 kg   Mode of weight measurement: Bed Scale   Volume Removed:  2100 ml    Treatment duration:  minutes    NS given:  No    Treatment shortened No   Medications given during Rx:  Epogen per order   Estimated Kt/V:  1.12   Access type: Permacath/TDC   Needle Gauge:    Access Issues: No    Report called to primary nurse:   Yes         Problem: METABOLIC, FLUID AND ELECTROLYTES - ADULT  Goal: Electrolytes maintained within normal limits  Description: INTERVENTIONS:- Monitor labs and assess patient for signs and symptoms of electrolyte imbalances- Administer electrolyte replacement as ordered- Monitor response to electrolyte replacements, including repeat lab results as appropriate- Instruct patient on fluid and nutrition as appropriate  Outcome: Progressing  Goal: Fluid balance maintained  Description: INTERVENTIONS:- Monitor labs - Monitor I/O and WT- Instruct patient on fluid and nutrition as appropriate- Assess for signs & symptoms of volume excess or deficit  Outcome: Progressing

## 2025-04-22 NOTE — PROGRESS NOTES
04/22/25 1000   Pain Assessment   Pain Assessment Tool 0-10   Pain Score 8   Pain Location/Orientation Orientation: Lower;Location: Back   Pain Radiating Towards to both L and R sides   Pain Onset/Description Onset: Ongoing;Descriptor: Aching   Effect of Pain on Daily Activities therapy within tolerance   Patient's Stated Pain Goal No pain   Hospital Pain Intervention(s) Other (Comment);Rest;Repositioned  (RN Susan notified and she provided medication)   Restrictions/Precautions   Precautions Bed/chair alarms;Fall Risk;Pain;Spinal precautions;Supervision on toilet/commode;Visual deficit   ROM Restrictions Yes  (L/S spinal precautions)   Braces or Orthoses LSO   Cognition   Overall Cognitive Status Impaired   Arousal/Participation Alert;Cooperative   Attention Attends with cues to redirect   Orientation Level Oriented X4   Memory Decreased recall of precautions   Following Commands Follows one step commands without difficulty   Subjective   Subjective I have a lot of things on my mind please dont rush me to do anything   Sit to Stand   Type of Assistance Needed Set-up / clean-up;Adaptive equipment   Comment RW   Sit to Stand CARE Score 5   Bed-Chair Transfer   Type of Assistance Needed Set-up / clean-up;Adaptive equipment   Comment RW   Chair/Bed-to-Chair Transfer CARE Score 5   Transfer Bed/Chair/Wheelchair   Limitations Noted In Balance;Confidence;Endurance;Pain Management;LE Strength;Vision   Adaptive Equipment Roller Walker   Walk 10 Feet   Type of Assistance Needed Set-up / clean-up;Adaptive equipment   Comment RW   Walk 10 Feet CARE Score 5   Walk 50 Feet with Two Turns   Type of Assistance Needed Set-up / clean-up;Adaptive equipment   Comment RW   Walk 50 Feet with Two Turns CARE Score 5   Walk 150 Feet   Type of Assistance Needed Set-up / clean-up;Adaptive equipment   Comment RW   Walk 150 Feet CARE Score 5   Ambulation   Primary Mode of Locomotion Prior to Admission Walk   Distance Walked (feet) 350 ft  "x2   Assist Device Roller Walker   Gait Pattern Inconsistant Lisa;Slow Lisa;Decreased foot clearance;Forward Flexion;Wide GARO;Step through;Improper weight shift  (improved B foot clearance noted during todays ambulation trials)   Limitations Noted In Balance;Endurance;Device Management;Heel Strike;Speed;Strength  (device management due to vision; no VC provided today in anticipation for d/c and pt did not hit any obstacles demonstrating improvement in this area)   Walk Assist Level Distant Supervision   Does the patient walk? 2. Yes   Wheelchair mobility   Does the patient use a wheelchair? 0. No   Curb or Single Stair   Style negotiated Single stair  (6\")   Type of Assistance Needed Supervision   Comment  steps   1 Step (Curb) CARE Score 4   4 Steps   Type of Assistance Needed Supervision   Comment BUE support on BHR; 6\"  steps with non reciprocal pattern asc leading w LLE, desc leading w. LLE. as this is pt preference.   4 Steps CARE Score 4   Stairs   Type Stairs   # of Steps 4   Weight Bearing Precautions Back;Fall Risk   Assist Devices Bilateral Rail   Therapeutic Interventions   Strengthening STS at mat table: 14, 10, 10 reps. Seated therex in recliner at start of session due to inc pain: B ankle pumps, LAQ, marches, hip abd.   Other lateral walking at HR in hallway; BUE support; x2   Assessment   Treatment Assessment Pt seen for skilled PT session today to focus on gait training, strength training and stair negotiation. Pt initially aggravated and did not want to participate in PT but with further discussion he verbalized that this was due to inc pain. CURLY Davis notified and she was able to provide pt additional medication. Started session with seated therex in recliner to get acclimated to movement and then did a long ambulation distance to \"loosen up.\" With conversation as distraction pt became more pleasant as the session went on and with autonomy provided to pick what he wanted to " work on he was more willing to complete tasks. Pt continues to make gains in his ambulation distance and is requiring DS with no LOB and no instances of nearing too close to the wall/objects in the hallway. Pt continues to express his worry about d/c and dialysis which we discussed today is stil being worked out. Continue per PT POC to address dec LE strength, balance and endurance with LE TE, repetitive task practice and IADL practice to achieve goals and increase safety.   Problem List Decreased strength;Decreased endurance;Impaired balance;Decreased mobility;Impaired judgement;Decreased safety awareness;Impaired vision;Pain;Orthopedic restrictions   Barriers to Discharge Inaccessible home environment;Decreased caregiver support   PT Barriers   Physical Impairment Decreased strength;Decreased range of motion;Decreased endurance;Impaired balance;Decreased mobility;Impaired vision   Functional Limitation Car transfers;Transfers;Standing;Stair negotiation;Walking   Plan   Treatment/Interventions ADL retraining;LE strengthening/ROM;Functional transfer training;Elevations;Therapeutic exercise;Endurance training;Cognitive reorientation;Patient/family training;Equipment eval/education;Bed mobility;Gait training;Compensatory technique education   Progress Progressing toward goals   PT Therapy Minutes   PT Time In 1000   PT Time Out 1130   PT Total Time (minutes) 90   PT Mode of treatment - Individual (minutes) 90   PT Mode of treatment - Concurrent (minutes) 0   PT Mode of treatment - Group (minutes) 0   PT Mode of treatment - Co-treat (minutes) 0   PT Mode of Treatment - Total time(minutes) 90 minutes   PT Cumulative Minutes 750   Therapy Time missed   Time missed? No

## 2025-04-22 NOTE — ASSESSMENT & PLAN NOTE
Volume:  euvolemic on exam   Blood pressure: Normotensive, /59  Low-sodium diet  Nifedipine 30 mg daily  UF on HD

## 2025-04-22 NOTE — ASSESSMENT & PLAN NOTE
"- Reporting \"floater\" within the right eye with acute onset on 4/11/25 at mid-day while watching TV; describes a dark, \"spider\"-shaped floater that occupies a significant portion of the visual field, painless; has similar floaters in the left eye but smaller  - Ophthalmology consulted on [facility] admission by IM, per prior trauma surgery documentation, Dr. Chaudhry of ophthalmology was made personally aware of clinical situation on 4/11/25 prior to 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 Pt will follow up with ophtho immediately after discharge and for referral to a retina specialist  > 4/15- Pt thinks his R eye floater may be larger today, unable to clearly state or quantify symptoms. No other visual complaints of increased number of floaters, double/blurry vision, or bright flashes. Advised patient to inform us if symptoms change - will reach out to Ophthalmology accordingly  > 4/18- No changes noted in vision/floaters. His overall impaired vision continues to be a barrier for his ADLs/iADLs  > 4/21- Stable R eye floater    - Monitor for changes in vision  "

## 2025-04-22 NOTE — PROGRESS NOTES
"OT daily tx note     04/22/25 0700   Pain Assessment   Pain Assessment Tool 0-10   Pain Score 7   Pain Location/Orientation Location: Back   Hospital Pain Intervention(s) Emotional support;Rest   Restrictions/Precautions   Precautions Bed/chair alarms;Cognitive;Fall Risk;Spinal precautions;Supervision on toilet/commode;Visual deficit;Pain;Impulsive   ROM Restrictions Yes  (lumbar spinal prec)   Braces or Orthoses LSO   Lifestyle   Autonomy \"all of you just don't get it\"   Eating   Type of Assistance Needed Independent   Physical Assistance Level No physical assistance   Eating CARE Score 6   Oral Hygiene   Type of Assistance Needed Supervision   Physical Assistance Level No physical assistance   Comment DS in stance at sink   Oral Hygiene CARE Score 4   Shower/Bathe Self   Type of Assistance Needed Physical assistance   Physical Assistance Level 25% or less   Comment SB completed seated in recliner and pt able to wash 8/10 body parts w/ A needed for feet to maintain spinal prec. OT introduced idea of LHS when DC but pt doesn't believe tools will work despite demo from OT   Shower/Bathe Self CARE Score 3   Upper Body Dressing   Type of Assistance Needed Supervision   Physical Assistance Level No physical assistance   Comment DS seated and able to manage LSO brace w/o VC   Upper Body Dressing CARE Score 4   Lower Body Dressing   Type of Assistance Needed Physical assistance   Physical Assistance Level 25% or less   Comment seated in recliner, pt declined use of LHR during session; pt more agitate this AM and min A needed to thread pants   Lower Body Dressing CARE Score 3   Putting On/Taking Off Footwear   Comment did not wish to change socks and/or utilize wide sock aid   Sit to Stand   Type of Assistance Needed Supervision   Physical Assistance Level No physical assistance   Comment DS w/ RW   Sit to Stand CARE Score 4   Bed-Chair Transfer   Type of Assistance Needed Supervision   Physical Assistance Level No " "physical assistance   Comment DS w/ RW   Chair/Bed-to-Chair Transfer CARE Score 4   Toileting Hygiene   Type of Assistance Needed Supervision   Physical Assistance Level No physical assistance   Comment in stance for urination at toilet and DS for cm/hygiene   Toileting Hygiene CARE Score 4   Toilet Transfer   Type of Assistance Needed Supervision   Physical Assistance Level No physical assistance   Comment DS w/ RW   Toilet Transfer CARE Score 4   Cognition   Overall Cognitive Status Impaired   Arousal/Participation Alert;Cooperative   Attention Attends with cues to redirect   Orientation Level Oriented X4   Memory Decreased recall of precautions   Following Commands Follows one step commands without difficulty   Assessment   Treatment Assessment (S)  Following SB pt requesting to rest and very tired due to poor sleep. However, OT explaining that in order for pt to go home soon, will need to assess IADLs and figure out plan due to new visual imp. Pt insistent on saying that he does not feel ready to go home and will never be ready unless his R eye can be fixed. OT explained that that may not happen soon and need to think of an alternative plan adapting around new visual imp. OT able to gather some info regarding IADL but will need to cont to address in each session:  Med mgmt - looking into [facility] if they can provide blister packs and/or deliver. If not will need to confirm w/ pt's sister if she can assist w/ sorting weekly meds using pill box    Laundry - sister agreed to help w/ initial washing but will need to develop plan and/or assess laundry simulation    Meal prep - initiated edu on walker tray on 4/16 but unable to get pt to participate since then in meal prep simulation. Will need to complete prior to DC to assess if safe in kitchen/using stove and plan for microwaveable meals if not safe    Dialysis transport plan - sister's fiance \"pushed too hard\" PTA to get pt to switch HD site and pt " refused. Explained to pt that this is solution to having bus transport to HD site if switches - pt understands and open to idea but inconsistent in responses    Finance mgmt - changed phone setting for larger print and made sister a contact + favorites list. Pt reports still unable to see print but able to read names of contact when asked. Pt may benefit from voicetext feature but will need to be edu and may not be appropriate due to dec frustration tolerance. Will cont to assess phone use and plan for finance mgmt (I.e auto payments)    ADL - pt would benefit from LHR, LHS, and wide sock aid for ease w/ CM while maintaining spinal prec however inconsistent carryover and frustration at times w/ edu   Prognosis Good   Problem List Decreased strength;Decreased endurance;Impaired balance;Decreased mobility;Impaired judgement;Decreased safety awareness;Impaired vision;Pain;Orthopedic restrictions   Barriers to Discharge Inaccessible home environment;Decreased caregiver support   Plan   Treatment/Interventions ADL retraining;Functional transfer training;Therapeutic exercise;Endurance training;Patient/family training   Progress Progressing toward goals   OT Therapy Minutes   OT Time In 0700   OT Time Out 0830   OT Total Time (minutes) 90   OT Mode of treatment - Individual (minutes) 90   OT Mode of treatment - Concurrent (minutes) 0   OT Mode of treatment - Group (minutes) 0   OT Mode of treatment - Co-treat (minutes) 0   OT Mode of Treatment - Total time(minutes) 90 minutes   OT Cumulative Minutes 699   Therapy Time missed   Time missed? No

## 2025-04-22 NOTE — ASSESSMENT & PLAN NOTE
Wt Readings from Last 3 Encounters:   04/22/25 95.4 kg (210 lb 6.4 oz)   04/11/25 98.5 kg (217 lb 2.5 oz)   03/24/25 107 kg (234 lb 12.6 oz)     - Most recent TTE from 10/9/24 with EF 60-65%, grade 1 diastolic dysfunction  - Volume management with HD per nephrology  - Monitor weights  - Appreciate medicine and nephrology recommendations and management

## 2025-04-22 NOTE — ASSESSMENT & PLAN NOTE
- Currently rate controlled  - Embolic stroke risk reduction with apixaban 5 mg BID    >4/22- Refusing it this morning. Extensive conversation had with patient to discuss the importance of compliance given his high stroke risk

## 2025-04-22 NOTE — PROGRESS NOTES
Progress Note - Nephrology   Name: Naresh Carpio 64 y.o. male I MRN: [id number]  Unit/Bed#: -01 I Date of Admission: 4/11/2025   Date of Service: 4/22/2025 I Hospital Day: 11     Assessment & Plan  ESRD (end stage renal disease) (HCC)  #ESRD on HD TTS:  Dialysis unit/days: [facility]  Access: PermCath  Plan for dialysis today    Fluid restriction 1-1.5L/d  Adjust medications to GFR<10  Avoid opioids     Primary hypertension  Volume:  euvolemic on exam   Blood pressure: Normotensive, /59  Low-sodium diet  Nifedipine 30 mg daily  UF on HD  Anemia due to chronic kidney disease, on chronic dialysis (HCC)  Current hemoglobin: 8.1 mg/dL  Treatment:  Outpatient Epogen  Transfuse for hemoglobin less than 7.0 per primary service      Traumatic retroperitoneal hematoma  Status post IR embolization  Management as per primary team  Secondary hyperparathyroidism (HCC)  Sevelamer with meals      I have reviewed the nephrology recommendations including hemodialysis today, with primary team, and we are in agreement with renal plan including the information outlined above. I have discussed the above management plan in detail with the primary service.     Subjective   Brief History of Admission -  63 yo man with PMH of ESRD on dialysis TTS admitted after a fall gated with hematoma.  Nephrology is consulted for management of ESRD     Patient feels well, no shortness of breath, no chest pain    Objective :  Temp:  [97.5 °F (36.4 °C)-98 °F (36.7 °C)] 97.5 °F (36.4 °C)  HR:  [54-64] 54  BP: (136-158)/(56-60) 136/59  Resp:  [16-18] 16  SpO2:  [98 %-100 %] 99 %  O2 Device: None (Room air)    Current Weight: Weight - Scale: 95.4 kg (210 lb 6.4 oz)  First Weight: Weight - Scale: 98.9 kg (218 lb)  I/O         04/20 0701 04/21 0700 04/21 0701 04/22 0700 04/22 0701 04/23 0700    P.O. 300 770     I.V. (mL/kg)       Total Intake(mL/kg) 300 (3.2) 770 (8.1)     Urine (mL/kg/hr) 0 (0) 75 (0)     Other       Total Output 0 75     Net  +300 +695            Unmeasured Urine Occurrence  1 x           Physical Exam  General:  no acute distress at this time  Skin:  No acute rash  Eyes:  No scleral icterus and noninjected  ENT:  mucous membranes moist  Neck:  no carotid bruits  Chest:  Clear to auscultation percussion, good respiratory effort, no use of accessory respiratory muscles  CVS:  Regular rate and rhythm without rub   Abdomen:  soft and nontender   Extremities: no significant lower extremity edema  Neuro:  No gross focality  Psych:  Alert , cooperative   Dialysis access: PermCath    Medications:    Current Facility-Administered Medications:     acetaminophen (TYLENOL) tablet 975 mg, 975 mg, Oral, Q8H KEMAL, Damian Cerda MD, 975 mg at 04/22/25 0548    allopurinol (ZYLOPRIM) tablet 100 mg, 100 mg, Oral, Daily, Damian Cerda MD, 100 mg at 04/22/25 0835    apixaban (ELIQUIS) tablet 5 mg, 5 mg, Oral, BID, Damian Cerda MD, 5 mg at 04/21/25 0947    atorvastatin (LIPITOR) tablet 80 mg, 80 mg, Oral, Daily With Dinner, Damian Cerda MD, 80 mg at 04/21/25 1633    bisacodyl (DULCOLAX) rectal suppository 10 mg, 10 mg, Rectal, Daily PRN, Michelle T Landa, DO, 10 mg at 04/18/25 0000    docusate sodium (COLACE) capsule 100 mg, 100 mg, Oral, BID, Michelle T Landa, DO, 100 mg at 04/22/25 0835    epoetin jessica (EPOGEN,PROCRIT) injection 8,000 Units, 8,000 Units, Intravenous, After Dialysis, German Linton MD, 8,000 Units at 04/19/25 1158    gabapentin (NEURONTIN) capsule 100 mg, 100 mg, Oral, Once per day on Monday Wednesday Friday, Damian Cerda MD, 100 mg at 04/21/25 0947    lidocaine (LIDODERM) 5 % patch 1 patch, 1 patch, Topical, Daily PRN, Damian Cerda MD, 1 patch at 04/18/25 2135    melatonin tablet 6 mg, 6 mg, Oral, HS, Jayden Rowland MD, 6 mg at 04/21/25 2134    methocarbamol (ROBAXIN) tablet 1,000 mg, 1,000 mg, Oral, Q8H [facility], Jayden Rowland MD, 1,000 mg at 04/22/25 0501    naloxone (NARCAN) 0.04 mg/mL syringe 0.04 mg, 0.04 mg, Intravenous, Q1MIN  "PRN, Damian Cerda MD    NIFEdipine (PROCARDIA XL) 24 hr tablet 30 mg, 30 mg, Oral, After Dinner, Damian Cerda MD, 30 mg at 04/21/25 1635    ondansetron (ZOFRAN-ODT) dispersible tablet 4 mg, 4 mg, Oral, Q6H PRN, Gino Austin PA-C, 4 mg at 04/20/25 0023    oxyCODONE (ROXICODONE) IR tablet 5 mg, 5 mg, Oral, Q4H PRN, 5 mg at 04/22/25 0638 **OR** oxyCODONE (ROXICODONE) immediate release tablet 10 mg, 10 mg, Oral, Q4H PRN, Damian Cerda MD, 10 mg at 04/21/25 0730    oxyCODONE (ROXICODONE) split tablet 2.5 mg, 2.5 mg, Oral, Q8H PRN, Jayden Rowland MD, 2.5 mg at 04/22/25 1014    polyethylene glycol (MIRALAX) packet 17 g, 17 g, Oral, Daily PRN, Michelle Landa DO, 17 g at 04/17/25 0845    senna (SENOKOT) tablet 17.2 mg, 2 tablet, Oral, Daily With Lunch, Michelle Landa DO, 17.2 mg at 04/21/25 1233    sevelamer (RENAGEL) tablet 1,600 mg, 1,600 mg, Oral, TID With Meals, Damian Cerda MD, 1,600 mg at 04/22/25 0835    white petrolatum-mineral oil (EUCERIN,HYDROCERIN) cream, , Topical, TID PRN, Michelle Landa DO      Lab Results: I have reviewed the following results:  Results from last 7 days   Lab Units 04/19/25  0818 04/17/25  1248   WBC Thousand/uL 10.34* 7.96   HEMOGLOBIN g/dL 8.1* 7.7*   HEMATOCRIT % 26.1* 24.5*   PLATELETS Thousands/uL 361 324   POTASSIUM mmol/L 4.3 4.2   CHLORIDE mmol/L 95* 94*   CO2 mmol/L 23 25   BUN mg/dL 37* 47*   CREATININE mg/dL 6.06* 6.83*   CALCIUM mg/dL 9.2 9.2       Administrative Statements     Portions of the record may have been created with voice recognition software. Occasional wrong word or \"sound a like\" substitutions may have occurred due to the inherent limitations of voice recognition software. Read the chart carefully and recognize, using context, where substitutions have occurred.If you have any questions, please contact the dictating provider.  "

## 2025-04-22 NOTE — DISCHARGE INSTR - AVS FIRST PAGE
DISCHARGE INSTRUCTIONS: [facility] ACUTE REHABILITATION [place]    Bring these instructions with you to your Outpatient Physician appointments so they can order and follow-up any additional lab work or imaging recommended at time of discharge..    Resume follow-up with all your prior providers that you have established care prior to this hospitalization.  Discuss with primary care physician (PCP) if you have additional questions.     It  is you or your caregivers responsibility to obtain follow-up MEDICATION REFILLS  As indicated through your Primary Care Physician (PCP) and other outpatient specialty provider(s) after discharge.  Please follow-up with your PCP as soon as possible after discharge to set-up follow-up management and when appropriate refills.      You remain a fall and injury risk which could be severe.  Your risk of fall has decreased however since admission to acute rehab.  Caregiver training has been completed with our staff.  Appropriate supervision +/- assistance as instructed during your rehab course is recommended to decrease risk of fall and injury.    Continue skilled therapy as discussed after discharge to further decrease this risk    If you (or your health care proxy) have any questions or concerns regarding your acute rehabilitation stay including issues with medications, rehabilitation, and follow-up plan, please call:          [facility] Acute Rehabilitation Unit in [place] at [phone] or [phone].      Should you develop fevers, chills, new weakness, changes in sensation, difficulty speaking, facial weakness, confusion, shortness of breath, chest pain, or other concerning symptoms please call 911 and/or obtain transportation to nearest ER immediately.    Should you develop worsening pain, swelling, or drainage notify your surgeon right away or obtain transportation to nearest ER for evaluation.    Should you develop feelings of significant hopelessness,  "severe depression, severe anxiety, or suicide you should call 911 or obtain transportation to nearest ER.    24-7 [employer] suicide and crisis lifeline call \"586\"  National Suicide Prevention Lifeline is 1-722.744.5134 and is available 24 hrs/7 days a week.   [facility] Crisis [phone] is available 24 hrs/7 days for mental health  [facility] Crisis [phone] is available 24 hrs/7 days for mental health   [facility] Crisis [phone]    PHYSICIANS to see:  Please see your doctors listed in the follow up providers section of your discharge paperwork, and take the discharge paperwork with you to your appointments.    Home health has been ordered for you:   Your home health agency should reach out to you or your family soon to arrange follow-up.    (If [facility] home health is your provider their phone number is [phone])    If you are unable to get in touch with home health you may contact your  at [phone]. [place]        SKIN CARE INSTRUCTIONS to follow:  Monitor left foot for increased redness, swelling, pain/tenderness, discharge/pus and promptly notify your PCP should these develop.  Should you develop significant pain, swelling, or drainage obtain transportation to nearest emergency room for immediate evaluation if unable to reach your surgeon promptly   Should you develop uncontrolled pain, fever, chills, sweats, changes in strength, sensation, or color of this area obtain transportation to nearest emergency room for immediate evaluation.      Monitor skin for increased redness or breadown and promptly notify your physician should these develop    If instructed while in [facility] - be sure you stand +/- walk every 1-2 hours and if advised use appropriate supervision/assistance to optimally offload your buttock/sacral region.  While seated or lying in bed shift positions and from side to side often.  Can use barrier type cream such as Hydraguard " 2 times per day and as needed.    Turn patient (yourself) fully every 2 hours while in bed.       WOUND CARE INSTRUCTIONS to follow:   Home health nursing will assist you with wound management.  You may contact them with issues as well once this service is set-up.    If [facility] is your provider their phone number is [phone].    If you are unable to get in touch with home health you may contact your  at [phone].      Due to the following you are at increased risk of skin breakdown/wounds/worsening wounds:  Impaired mobility  Medical co-morbidities  Bowel dysfunction    Buttocks/Sacrum  Turn as full as possible off sacrum/buttocks every 2 hours  Use Cushion while in chair or bed  Weight shift every 10-15 minutes while in chair  Keep skin clean and dry as possible.  Remove wet or soiled clothing/linens promptly  Use barrier cream or similar 2 times per day   Monitor skin for increased breakdown which you are at risk of and notify nursing, PCP, or other physician providers should this occur right away    Heels/bony edges of feet  Float heels off edge of pillow for added pressure relief.  Monitor heels and bony protuberances and notify physician or nursing for any increased redness, bogginess, or breakdown    Wound care of Left plantar and left toes wounds at this time is:  Clean wounds with normal saline and pat dry. Paint all wounds with betadine daily and cover plantar open wound with silicone bordered foam dressing. Change every other day and as needed  Primary management of your wounds is wound care.      Wound care of R foot wounds at this time is:  Clean wounds with normal saline and pat dry. Paint all wounds with betadine daily and cover wound with Allevyn square pad. Change every other day and as needed.  Primary management of your wounds is wound care.      Wound care of sacral wound at this time is:  Clean wounds with normal saline, pat dry and apply calcium alginate with  silver to wound bed and cover with foam.  Change every other day and as needed.  Primary management of your wounds is wound care.     Ensure appropriate wound care to optimize chances for healing and decrease risks of complications.    Your wound(s) remains at risk for worsening and infection.    - Should you develop significant pain, swelling, or drainage obtain transportation to nearest emergency room for immediate evaluation if unable to reach your surgeon promptly   - Should you develop uncontrolled pain, fever, chills, sweats, changes in strength, sensation, or color of this area obtain transportation to nearest emergency room for immediate evaluation.        WEIGHTBEARING/ACTIVITY PRECAUTIONS to follow:    Weightbearing as tolerated.    Please wear your LSO brace when out of bed and with walking. Please do so until you follow up with spine surgery outpatient.      Driving restrictions:    You are recommended against driving until cleared by an outpatient physician.       You should NOT operate a motor vehicle while under the influence of an opiate medication, muscle relaxant, or other sedative.  Doing so may lead to an accident resulting in serious injury or death to yourself or others.  You have agreed to avoid driving when under the influence of this medication.      Work restrictions:  You should NOT return to work (if working) until cleared by an outpatient physician.    You should not operate heavy machinery (if applicable) until cleared by an outpatient physician.      Alcohol restrictions:  You are recommended to not drink alcohol at this time unless cleared by an outpatient physician.     Smoking restrictions:  You are recommended to not smoke nicotine.  Smoking increases your risk of heart attack, stroke, emphysema/COPD, and lung cancer.      Cannabis restrictions:  You are recommended to not smoke/ingest/consume/use cannabis/marijuana at this time unless cleared by an outpatient physician.    Illicit  drug use restrictions:   You should not use cocaine, crack, speed/methamphetamine, heroin, LSD, ecstasy or other illicit substances as they can increase your risk of injury and medical complication which could be severe and lead to sub-optimal functional recovery.    MEDICATIONS:  Please see a full list of your medications outlined in the After Visit Summary that is attached to these Discharge Instructions.  Please note changes may have been made to your medications please refer to your discharge paperwork for your current medications and take this list with you to all your doctors appointments for your doctors to review.  Please do not resume a home medication unless the medication reconciliation sheet indicates to do so, please do not assume that a medication that you were given a prescription for is the same as a medication you have at home based on both medications having the same name as dosages and frequency may have changed.      Unless specifically noted in your medication list provided to you in your discharge paper work do not resume prior vitamins, minerals, or supplements you may have been taking prior to your hospitalization unless instructed by an outpatient physician in the future.  Discuss with your primary care at next visit if applicable.        Acetaminophen (Tylenol) Dosing Warning:    You may have up to 3000 mg of acetaminophen (Tylenol) from all sources spread out over a 24 hours period.  Do not have more than that, as this can increase your risk of liver injury which can be serious.      NSAID Warning:  Please avoid NSAID (including but not limited to advil, aleve, motrin, naproxen, ibuprofen, mobic, meloxicam, diclofenac etc) medications as NSAID medications may increase your risk of bleeding (which can be life-threatening), stroke, worsening kidney disease, heart attack, developing/worsening GI ulcers, worsening heart failure, worsening liver (cirrhosis) disease, potentially delay bone  healing.        Sedating Medications with increased risk of complications:    These medications were started prior to your acute rehabilitation course as recommended by your prior physicians.  It was continued during your acute rehabilitation course.  This(these) medication(s) will need to be managed by your outpatient physician(s) after discharge.  Follow-up with the appropriate provider as soon as possible to ensure appropriate use.    Your goal will be to wean off of opiate medications and then onto acetaminophen only and then off of acetaminophen as well if possible.    There are risks associated with opioid medications, including dependence, addiction and tolerance. The patient understands and agrees to use these medications only as prescribed. Potential side effects of the medications include, but are not limited to, constipation, drowsiness, addiction, impaired judgment and risk of fatal overdose if not taken as prescribed. You should not drive after taking this medication.  The patient was warned against driving while taking sedation medications.  Sharing medications is a felony. At this point in time, the patient is showing no signs of addiction, abuse, diversion or suicidal ideation.    Oxycodone (opiate) pain medication has been used to help your acute pain.  You tolerated this medication adequately during your recent hospital stay.       You will be given a limited supply of opiate pain medications on discharge; should you require additional refills/medications, your PCP and/or surgeon may prescribe at his/her discretion.   This can be quite helpful particularly for severe acute pain.      There are risks associated with opioid medications. They can increase your risk of falling, injury, and breathing difficulties which can be severe and even life-threatening.  Note it is advisable to limit use of opiate pain medications as they can become habit forming and lead to addiction.  Other potential side  effects of the medication include, but are not limited to, constipation, drowsiness, impaired judgment and risk of fatal overdose if not taken as prescribed. You should not drive while taking this medication  The patient was warned against driving while taking sedation medications.  Sharing medications is a felony. At this point in time, the patient is showing no signs of addiction, abuse, diversion or suicidal ideation.  The patient (you/or relevant caregiver) understands and agrees to use this medication only as prescribed.    Methocarbamol (Robaxin) pain/muscle spasm medication has been used to help your acute pain and spasms.  You tolerated this medication adequately during your recent hospital stay.     - Do not take with alcohol (or marijuana/cannabis) while on this medication as this can cause increased confusion, breathing problems, falls, and severe injury.    You will be given a very limited supply of both opiate pain and muscle relaxant medications both of which can increase your risk of fall and severe and even life-threatening injury.  Taking both medications together further increase your risk of fall and even life-threatening injury as well as respiratory depression (slowing and stopping of breathing).  Physicians at times will carefully use these medications in combination but this must be done with close oversight.  You have been carefully monitored during your rehab course on these medications without signs of increased confusion, respiratory depression, or worsening balance.  Nevertheless, this risk remains.  Should you develop confusion, difficulty staying awake, imbalance or other concerning symptoms do NOT take these medications and notify your physician right away or obtain transportation to nearest emergency room.  You have been discussed risks and benefits of these medications and at this time have agreed to risks associated with these medications.      Unless specifically discussed with  physician, please do not combine any muscle relaxants (including but not limited to zanaflex, flexaril, soma, robaxin, etc) pain medications (including but not limited to tramadol, vicodin, norco, percocet, oxycodone, oxycontin, morphine, hydropmorphone, oxymorphone, fentanyl, hydrocodone, etc)  or sedatives/sleep aids/anti-anxiety medications (including but not limited to ambien, trazodone, valium, xanax, klonipin, ativan, lorazepam, restoril, temazepam etc) that you may have been prescribed prior to admission with the pain medication you are being prescribed upon discharge from the rehab unit (unless listed to do so on your medication reconciliation in your discharge paperwork) .     Note:  It is advisable to limit the use of pain medications (including but not limited to tramadol, vicodin, norco, percocet, oxycodone, oxycontin, morphine, hydropmorphone, oxymorphone, fentanyl, hydrocodone, etc) and limit certain sedatives medications (including but not limited to ambien, trazodone, valium, xanax, klonipin, ativan, lorazepam, restoril, temazepam etc), muscle relaxants (including but not limited to zanaflex, flexaril, soma, robaxin, etc); unless listed to do so on your medication reconciliation in your discharge paperwork, as they may become habit forming and lead to additiction. Some of these medications can be particularly important however to take.  You should not change how you take a prescribed medication unless done so in coordination with a physician.      Gabapentin has been used to help pain.  You tolerated this medication adequately during your recent hospital stay.     - Take 100 mg once a day on Mon/Wed/Fri each week.  - Do not stop this medication abruptly as this can cause seizures.  - If stopping medication I would decrease by discussing with your PCP first.  - Do not take with alcohol (or marijuana/cannabis) while on this medication as this can cause increased confusion, breathing problems, falls,  and severe injury.  - This medication can increase risk of confusion, fall, and injury although you did tolerate adequately during rehab course.    - If pain not adequately controlled with primary providers after discharge you may request referral or request appointment to outpatient pain management specialist such as:     [facility]   [email or web address]  [phone] ([phone])       MEDICAL MANAGEMENT AT HOME specific to you      Heart Failure Management:  Take your medications as prescribed at time of discharge unless changed by another physician in future.   You should maintain a low sodium diet.  You are recommended to maintain a 1200 ml fluid restriction per day.  1 cup (8 oz) is roughly equal to 235 ml.   5 (8 oz) cups is equal to close to 1200 ml of fluid.  These recommendations are meant to prevent build up of excessive fluid inside you which can make it harder to breath.      Notify your physicians should you develop increased swelling, weight gain, or shortness of breath.  If you develop significant shortness of breath, chest pain, confusion, or other concerning signs or symptoms call 911 or obtain transportation to nearest emergency room right away.        Please note a summary of your hospital stay with relevant information for your doctors will try to be sent to them.  Please confirm with your doctors at your follow up visits that they have received this summary and have them contact [facility] if they have not received them along with any other medical records they may require.     [facility] Phone Number:  [phone]    Discharge Instructions - Podiatry    Weight Bearing Status: Weightbearing as tolerated                       Pain: Continue analgesics as directed    Follow-up appointment instructions: Please make an appointment within one week of discharge with [facility]. Contact sooner if any  increase in pain, or signs of infection occur    Wound Care Instructions: Paint bilateral foot wounds with Betadine and cover with dry 4 x 4 gauze, rolled gauze secured with tape.  Please change dressings every Monday Wednesday Friday.

## 2025-04-22 NOTE — PROGRESS NOTES
"Progress Note - Hospitalist   Name: Naresh Carpio 64 y.o. male I MRN: [id number]  Unit/Bed#: -01 I Date of Admission: 4/11/2025   Date of Service: 4/22/2025 I Hospital Day: 11    Assessment & Plan  Traumatic retroperitoneal hematoma  Admitted to the trauma service after a fall with L2 vertebral body and retroperitoneal hematoma.  S/p IR embolization of distal right deep circumflex iliac artery/distal right superior gluteal artery both of which supplied an area of active extravasation and then pseudoaneurysms present at distal branches of the left inferior gluteal artery. The artery was embolized using coils and gelfoam.   Hemoglobin stable on 4/14 and on 4/17 had improved to 7.7  Will continue to watch  He was type and screened 4/17/25 in case needed blood during HD on that day.  Hgb 4/19 improved to 8.1  Anemia due to chronic kidney disease, on chronic dialysis ([facility])  Had 5 U PRBCs during hospital stay  Hemoglobin has been running mostly 7.3-7.4.    On 4/14/25 was 7.6; on 4/15/24 was 7.2 ---> Renal increased his Epogen in light of his hemoglobin  On 4/17, hemoglobin had increased to 7.7  4/19/25 Hgb up to 8.1.   CBC Tuesday in HD 4/22/25 = pending  ESRD (end stage renal disease) ([facility])  Continue Sevelamer TID with meals  Renal following  HD T-TH-Sat while here in rehab  Paroxysmal atrial fibrillation ([facility])  On no rate control medications  Examines RRR  Anticoagulation with Eliquis as at home but he doesn't want to take it because he is worried it will make his right eye vision worse.  I d/w on call Optho Dr Diaz = \"The risk is not quantified in the literature. Dr edmond's note suggests a diagnosis of proliferative diabetic retinopathy and bilateral and recurrent vitreous hemorrhage. So he is at risk for - and has had hemorrhage - whether or not he is on eliquis (assuming dr edmond is correct).     I would always recommend life and death are more important so to do what you recommend re his systemic " "condition, and then to follow up with dr edmond as he recommended so a real exam can be done in the clinic.   Chronic diastolic (congestive) heart failure (HCC)  Appears euvolemic except for LE pitting edema which appears to be chronic  Renal diet  stable  Volume management via HD  Primary hypertension  Continue Procardia XL 30 mg qd  Tx per renal  Visual disturbance  Reported a right eye \"floater\" without associated pain earlier in his hospitalization which has resolved but continued to have blurry central vision.  Ophthalmology saw here in [facility] = visual acuity without correction is 20/400 OU. Dx: Proliferative diabetic retinopathy with vitreous hemorrhage OU as well as cataracts.   Will need outpatient follow up immediately after discharge from rehab  Diabetic ulcer of left midfoot associated with type 2 diabetes mellitus, limited to breakdown of skin (HCC)  Continue local care  Being followed by WC  Last LEADS was 3/28/25 = 50-75% right prox polital and prox tibioperoneal trunk; >75% stenosis in prox popliteal artery on left  Was seen by VS in 12/2024 hospital stay and he declined any intervention at the time  Hyponatremia  Renal following- appreciate recommendations:  Check sodium level prior to dialysis on dialysis days  Continue fluid restriction 1200 cc per 24 hours  Continue 138 mEq sodium bath on dialysis   Closed fracture of proximal end of left humerus with routine healing  Occurred from a fall in February and eval'd at the time of the injury  Was to continue NWB/sling and followup with Ortho but there was no followup done  Xray 4/13/25 = \"Unchanged alignment of the chronic greater tuberosity avulsion fracture\".   Ortho saw here on 4/14 = WBAT; see Dr Cisneros 4 weeks  Gout  Continue Allopurinol  L2 vertebral fracture (HCC)  CT CAP 4/2: Fracture of L2 vertebral body extending to the superior endplate  Continue LSO brace  Insomnia  Patient reports not sleeping well at night because he has trouble getting " comfortable. He has been trying to sleep in the recliner     The above assessment and plan was reviewed and updated as determined by my evaluation of the patient on 4/22/2025.    History of Present Illness   Patient seen and examined. Patients overnight issues or events were reviewed with nursing staff. New or overnight issues include the following:   No new or overnight issues.  Offers no complaints except that he doesn't want to take his Eliquis as explained above.    Review of Systems    Objective :  Temp:  [97.5 °F (36.4 °C)-98 °F (36.7 °C)] 97.5 °F (36.4 °C)  HR:  [54-64] 54  BP: (136-158)/(56-60) 136/59  Resp:  [16-18] 16  SpO2:  [98 %-100 %] 99 %  O2 Device: None (Room air)    Invasive Devices       Hemodialysis Catheter  Duration             HD Permanent Double Catheter 601 days                    Physical Exam  General Appearance: no distress, nontoxic appearing  HEENT:  External ear normal.  Nose normal w/o drainage. Mucous membranes are moist. Oropharynx is clear. Conjunctiva clear w/o icterus or redness.  Neck:  Supple, normal ROM  Lungs: BBS without crackles/wheeze/rhonchi; respirations unlabored with normal inspiratory/expiratory effort.  No retractions noted.  On RA  CV: regular rate and rhythm; no rubs/murmurs/gallops, PMI normal   ABD: Abdomen is soft.  Bowel sounds all quadrants.  Nontender with no distention.    EXT: no edema  Skin: normal turgor, normal texture  Psych: affect normal, mood normal  Neuro: AAO       The above physical exam was reviewed and updated as determined by my evaluation of the patient on 4/22/2025.      Lab Results: I have reviewed the following results:  Results from last 7 days   Lab Units 04/19/25  0818 04/17/25  1248   WBC Thousand/uL 10.34* 7.96   HEMOGLOBIN g/dL 8.1* 7.7*   HEMATOCRIT % 26.1* 24.5*   PLATELETS Thousands/uL 361 324     Results from last 7 days   Lab Units 04/19/25  0818 04/17/25  1248   SODIUM mmol/L 129* 131*   POTASSIUM mmol/L 4.3 4.2   CHLORIDE  mmol/L 95* 94*   CO2 mmol/L 23 25   BUN mg/dL 37* 47*   CREATININE mg/dL 6.06* 6.83*   CALCIUM mg/dL 9.2 9.2                   Imaging Results Review: No pertinent imaging studies reviewed.  Other Study Results Review: No additional pertinent studies reviewed.    Review of Scheduled Meds: Medications  reviewed and reconciled as needed  Current Facility-Administered Medications   Medication Dose Route Frequency Provider Last Rate    acetaminophen  975 mg Oral Q8H [facility] Damian Cerda MD      allopurinol  100 mg Oral Daily Damian Cerda MD      apixaban  5 mg Oral BID Damian Cerda MD      atorvastatin  80 mg Oral Daily With Dinner Damian Cerda MD      bisacodyl  10 mg Rectal Daily PRN Michelle T Landa, DO      docusate sodium  100 mg Oral BID Michelle T Landa, DO      epoetin jessica  8,000 Units Intravenous After Dialysis German Linton MD      gabapentin  100 mg Oral Once per day on Monday Wednesday Friday Damian Cerda MD      lidocaine  1 patch Topical Daily PRN Damian Cerda MD      melatonin  6 mg Oral HS Jayden Rowland MD      methocarbamol  1,000 mg Oral Q8H [facility] Jayden Rowland MD      naloxone  0.04 mg Intravenous Q1MIN PRN Damian Cerda MD      NIFEdipine  30 mg Oral After Dinner Damian Cerda MD      ondansetron  4 mg Oral Q6H PRN Gino Austin PA-C      oxyCODONE  5 mg Oral Q4H PRN Damian Cerda MD      Or    oxyCODONE  10 mg Oral Q4H PRN Damian Cerda MD      oxyCODONE  2.5 mg Oral Q8H PRN Jayden Rowland MD      polyethylene glycol  17 g Oral Daily PRN Michelle T Landa, DO      senna  2 tablet Oral Daily With Lunch Michelle T Landa, DO      sevelamer  1,600 mg Oral TID With Meals Damian Cerda MD      white petrolatum-mineral oil   Topical TID PRN Michelle T Landa, DO         VTE Pharmacologic Prophylaxis: Eliquis  Code Status: Level 1 - Full Code  Current Length of Stay: 11 day(s)    Administrative Statements     ** Please Note:  voice to text software may have been used in the creation of this  document. Although proof errors in transcription or interpretation are a potential of such software**

## 2025-04-22 NOTE — ASSESSMENT & PLAN NOTE
#ESRD on HD TTS:  Dialysis unit/days: [facility]  Access: PermCath  Plan for dialysis today    Fluid restriction 1-1.5L/d  Adjust medications to GFR<10  Avoid opioids

## 2025-04-22 NOTE — ASSESSMENT & PLAN NOTE
"Reported a right eye \"floater\" without associated pain earlier in his hospitalization which has resolved but continued to have blurry central vision.  Ophthalmology saw here in [facility] = visual acuity without correction is 20/400 OU. Dx: Proliferative diabetic retinopathy with vitreous hemorrhage OU as well as cataracts.   Will need outpatient follow up immediately after discharge from rehab  "

## 2025-04-22 NOTE — ASSESSMENT & PLAN NOTE
- Has chronic wounds affecting the left foot plantar surface, left foot 1st and 2nd digits  - Left Plantar Foot Wounds and Left toes:  Cleanse with NSS and pat dry. Paint all wounds with Betadine daily and cover plantar open wound with silicone bordered foam dressing. Naren with T for Treatment and change every other day or PRN   > 4/22 : Wound care recs: Paint left foot 1st and 2nd digits with betadine dailyand covering plantar open wound with foam. Podiatry consulted

## 2025-04-22 NOTE — ASSESSMENT & PLAN NOTE
Current hemoglobin: 8.1 mg/dL  Treatment:  Outpatient Epogen  Transfuse for hemoglobin less than 7.0 per primary service

## 2025-04-22 NOTE — CONSULTS
Podiatry - Consultation    Patient Information:   Naresh Carpio 64 y.o. male MRN: [id number]  Unit/Bed#: -01 Encounter: [id number]  PCP: Jeffrey Jackson  Date of Admission:  4/11/2025  Date of Consultation: 04/22/25  Requesting Physician: Lavinia Sawyer MD      ASSESSMENT:    Naresh Carpio is a 64 y.o. male with:    Left submetatarsal 5 diabetic ulceration to level of subcutaneous tissue, Delgadillo 2  Left medial 1st MTPJ diabetic ulceration to the level of subcutaneous tissue, Delgadillo 2  Multiple stable dry eschars to B/L feet/legs   Type 2 diabetes with peripheral neuropathy (A1c 5.7%)  End-stage renal disease on hemodialysis    PLAN:    Patient was seen and evaluated at bedside. Left submet 5 wound was debrided and noted to be stable. See procedure note below. Bilateral stable dry eschars to the feet/legs. No acute clinical signs of infection.    Left foot radiographs reviewed from 3/26. Agree with the final read: Age-indeterminate nondisplaced fracture of the distal metaphysis of the left 4th proximal phalanx. Limited evaluation for osteomyelitis without obvious findings to suggest its presence.   Non invasive arterial studies from 3/28 reviewed:  Diffuse disease noted throughout the femoral-popliteal arteries. Evidence of 50-75% stenosis in the proximal popliteal artery. Evidence of 50-75% stenosis at the proximal tibio-peroneal trunk.   LLE: Diffuse disease noted throughout the femoral-popliteal arteries. Evidence of >75% stenosis in the proximal popliteal artery.  Tech Note: Patient refused SALVATORE.  Follow up repeat left foot radiographs.  Follow up B/L ABIs as patient previously refused.   Recommend vascular consult given proximal stenosis and chronicity of wounds.    Local wound care consisting of betadine, DSD to bilateral lower extremity wounds.   Wound care instructions placed - appreciate nursing assistance with dressing changes.   Elevation on green foam wedges or pillows when non-ambulatory  Rest of  care per primary team.  Will discuss this plan with my attending and update as needed.    Debridement Procedure:    After  Verbal Consent was obtained and time out performed. Wound located left submet 5 was  excisionally Surgical- Subcutaneous  (CPT: 04929) debrided using scapel. Pre-debridement wound measures 2.0 cm x 0.5 cm x 0.1 cm.  Pain was controlled by Lack of sensation due to Neuropathy . Post debridement measurement 3.0 cm x 1.0 cm x 0.3 cm for a total of < 20 square centimeters, with wound appearing Fresh bleeding tissue, viable, and granular. 100%  of wound debrided. Tissue debrided includes depth of Epidermis, Dermis, and Subcutaneous with devitalized tissue being debrided including Hyperkeratosis, Fibrous, and Necrotic/eschar.  with a small amount of bleeding bleeding was noted during procedure. Hemostasis was achieved using pressure. Pain noted during procedure rated as 0. Pain noted after procedure rated as 0. Procedure was Well tolerated by patient.       Weightbearing status: WBAT bilaterally     SUBJECTIVE:    History of Present Illness:    Naresh Carpio is a 64 y.o. male who is originally admitted 4/11/2025 after being transferred from [facility] due to a fall with a retroperitoneal hematoma. Patient has a past medical history of type II diabetes mellitus, HTN, ESRD on dialysis, anemia due to CKD, a fib, CHF.     We are consulted for patients bilateral foot wounds. Patient was previously seen by podiatry on his last admission at the [facility]. He received bilateral foot x-rays with no definitive evidence of osteomyelitis. Patient had an MRI ordered but it was canceled due to patient not being able to tolerate lying flat secondary to acute lumbar fracture.  Additionally patient received arterial studies however he refused ABIs.  At time of visit patient states he is very reluctant to receive any additional intervention to his feet.  After discussing with patient, he is hesitant to  receive ABIs, vascular surgery consult, MRI.      Patient denies nausea, vomiting, fever, chills, shortness of breath, chest pain.     Review of Systems:    Constitutional: Negative.    HENT: Negative.    Eyes: Negative.    Respiratory: Negative.    Cardiovascular: Negative.    Gastrointestinal: Negative.    Musculoskeletal: Negative    Skin: Bilateral leg and foot wounds    Neurological: Neuropathy    Psych: Negative.     Past Medical and Surgical History:     Past Medical History:   Diagnosis Date    Acute cystitis 10/18/2024    Acute on chronic anemia 01/23/2022    Atrial fibrillation (HCC)     Bilateral sacral fracture, closed (HCC) 07/03/2024    Bradycardia 09/05/2023    Diabetes mellitus (HCC)     ESRD (end stage renal disease) on dialysis ([facility])     Hematuria 10/10/2024    Hematuria 10/10/2024    Hyperkalemia 04/06/2024    Hyperkalemia 04/06/2024    Hyperlipidemia     Hypertension     Left toe amputee ([facility])     Subacute osteomyelitis of right foot ([facility])     TIA (transient ischemic attack)     Toxic metabolic encephalopathy 10/08/2024    Traumatic rhabdomyolysis ([facility]) 08/19/2023       Past Surgical History:   Procedure Laterality Date    AMPUTATION Left     left foot resection 10 years ago dr tran    HEMODIALYSIS ADULT  1/18/2025    HEMODIALYSIS ADULT  2/27/2025    IR EMBOLIZATION (SPECIFY VESSEL OR SITE)  4/3/2025    IR EMBOLIZATION (SPECIFY VESSEL OR SITE)  4/7/2025    IR LOWER EXTREMITY ANGIOGRAM  08/29/2023    IR TEMPORARY DIALYSIS CATHETER PLACEMENT  08/25/2023    IR TUNNELED CENTRAL LINE REMOVAL  08/23/2023    IR TUNNELED DIALYSIS CATHETER PLACEMENT  01/27/2022    IR TUNNELED DIALYSIS CATHETER PLACEMENT  08/30/2023    SD AMPUTATION METATARSAL W/TOE SINGLE Right 8/31/2023    Procedure: RIGHT PARTIAL 1ST RAY RESECTION WITH  WOUND VAC APPLICATION;  Surgeon: Won Parmar DPM;  Location: [facility];  Service: Podiatry       Meds/Allergies:      Medications Prior to Admission:     acetaminophen (TYLENOL)  "325 mg tablet    allopurinol (ZYLOPRIM) 100 mg tablet    apixaban (Eliquis) 5 mg    atorvastatin (LIPITOR) 80 mg tablet    gabapentin (NEURONTIN) 100 mg capsule    lidocaine (LIDODERM) 5 %    methocarbamol (ROBAXIN) 500 mg tablet    NIFEdipine (PROCARDIA XL) 30 mg 24 hr tablet    [] oxyCODONE (ROXICODONE) 10 MG TABS    senna-docusate sodium (SENOKOT S) 8.6-50 mg per tablet    sevelamer (RENAGEL) 800 mg tablet    No Known Allergies    Social History:     Marital Status:     Substance Use History:   Social History     Substance and Sexual Activity   Alcohol Use Not Currently    Alcohol/week: 0.0 standard drinks of alcohol    Comment: 0     Social History     Tobacco Use   Smoking Status Never    Passive exposure: Never   Smokeless Tobacco Never     Social History     Substance and Sexual Activity   Drug Use Never       Family History:    History reviewed. No pertinent family history.      OBJECTIVE:    Vitals:   Blood Pressure: 136/59 (25)  Pulse: (!) 54 (25)  Temperature: 97.5 °F (36.4 °C) (25)  Temp Source: Oral (25)  Respirations: 16 (25)  Height: 6' 1\" (185.4 cm) (25)  Weight - Scale: 95.4 kg (210 lb 6.4 oz) (25)  SpO2: 99 % (2544)    Physical Exam:    General Appearance: Alert, cooperative, no distress.  HEENT: Head normocephalic, atraumatic, without obvious abnormality.  Heart: Normal rate and rhythm.  Lungs: Non-labored breathing. No respiratory distress.  Abdomen: Without distension.  Psychiatric: AAOx3  Lower Extremity:  Vascular:   Right DP and PT pulses are absent. Left DP and PT pulses are absent. CRT < 3 seconds at the digits. +3/4 edema noted at bilateral lower extremities. Pedal hair is absent. Skin temperature is WNL bilaterally.    Musculoskeletal:  MMT is 4/5 in all muscle compartments bilaterally. ROM at the 1st MPJ and ankle joint are reduced bilaterally with the leg extended. No Pain on palpation " "of lower extremities. Left partial 5th toe amp and right partial 1st ray amp     Dermatological:  Lower extremity wound(s) as noted below:    Wound #: 1  Location: Left submet 5  Length 3.0 cm: Width 1.0 cm: Depth 0.3 cm:   Deepest Tissue Noted in Base: subcutaneous  Probe to Bone: No  Peripheral Skin Description: Attached  Granulation: 90% Fibrotic Tissue: 10% Necrotic Tissue: 0%   Drainage Amount: moderate, serosanguinous  Signs of Infection: No  - no ascending edema, erythema, malodor, purulence         Wound #: 2  Location: Left medial 1st MTPJ   Length 1.0 cm: Width 0.5 cm: Depth 0.2 cm:   Deepest Tissue Noted in Base: subcutaneous   Probe to Bone: No  Peripheral Skin Description: Attached  Granulation: 90% Fibrotic Tissue: 10% Necrotic Tissue: 0%   Drainage Amount: minimal, serosanguinous  Signs of Infection: No          Bilateral lower extremities: Multiple stable dry eschars to the feet/legs. No signs of active infection: no purulence, no malodor, no ascending erythema, no crepitus, no fluctuance.          Neurological:  Gross sensation is diminished. Protective sensation is diminished. Patient Reports numbness and/or paresthesias.    Additional data:     Lab Results: I have personally reviewed pertinent labs including:    Results from last 7 days   Lab Units 04/19/25  0818   WBC Thousand/uL 10.34*   HEMOGLOBIN g/dL 8.1*   HEMATOCRIT % 26.1*   PLATELETS Thousands/uL 361     Results from last 7 days   Lab Units 04/19/25  0818   POTASSIUM mmol/L 4.3   CHLORIDE mmol/L 95*   CO2 mmol/L 23   BUN mg/dL 37*   CREATININE mg/dL 6.06*   CALCIUM mg/dL 9.2           Cultures: I have personally reviewed pertinent cultures including:              Imaging: I have personally reviewed pertinent reports in PACS.  EKG, Pathology, and Other Studies: I have personally reviewed pertinent reports.        ** Please Note: Portions of the record may have been created with voice recognition software. Occasional wrong word or \"sound a " "like\" substitutions may have occurred due to the inherent limitations of voice recognition software. Read the chart carefully and recognize, using context, where substitutions have occurred. **    "

## 2025-04-22 NOTE — ASSESSMENT & PLAN NOTE
"On no rate control medications  Examines RRR  Anticoagulation with Eliquis as at home but he doesn't want to take it because he is worried it will make his right eye vision worse.  I d/w on call Optho Dr Diaz = \"The risk is not quantified in the literature. Dr edmond's note suggests a diagnosis of proliferative diabetic retinopathy and bilateral and recurrent vitreous hemorrhage. So he is at risk for - and has had hemorrhage - whether or not he is on eliquis (assuming dr edmond is correct).     I would always recommend life and death are more important so to do what you recommend re his systemic condition, and then to follow up with dr edmond as he recommended so a real exam can be done in the clinic.   "

## 2025-04-22 NOTE — ASSESSMENT & PLAN NOTE
> 4/21- New sacral pressure injury noted by staff. Pictures reviewed on chart. Patient primarily sleeps in recliner (both here and at home). Discussed with therapy and pt will use a cushion that offers sacral relief on recliner. Will continue to monitor  >4/22- Wound care recs:  Cleanse wound with NSS, pat dry. Apply calcium alginate with silver to wound bed and cover with foam. Change every other day and prn. Cushion will not be covered per therapy for home - advised patient to find one off [employer] to continue offloading     - Moisturize skin daily with skin nourishing cream  - Ehob cushion in chair when out of bed.  - Preventative hydraguard to bilateral heels BID and PRN.   - Calazime paste to sacrum/buttocks BID and PRN  - Monitor clinically for breakdown, frequent turns  - Wound care RN consulted and following

## 2025-04-22 NOTE — ASSESSMENT & PLAN NOTE
- Rehabilitation medicine physician for daily monitoring of care, 24 hour availability for acute medical issues, medication management, and therapeutic and diagnostic assessments.  - 24 hour rehabilitation nursing 7 days per week for: management/teaching of medications, bowel/bladder routine, skin care.  - PT, OT for 2-3 hours per day, 5 to 6 days per week; 15 hours per week  - Rehabilitation Psychology as needed for adjustment and coping  - CM for barriers to discharge, community resources, and family support  - Discharge planning following to help ensure a safe and efficient discharge  - 900/7 program due to HD status, pain, expected therapy tolerance  4/18- Ongoing dispo planning with patient/sister and CM. Staff expressed that patient is progressing well from a functional standpoint but continues to have difficulties with ADLs/iADLs due to impaired vision. Will continue to work towards goals for safe dischare  4/22- DC date now pending outpatient HD setup

## 2025-04-22 NOTE — PROGRESS NOTES
Progress Note - PMR   Name: Naresh Carpio 64 y.o. male I MRN: [id number]  Unit/Bed#: -01 I Date of Admission: 4/11/2025   Date of Service: 4/22/2025 I Hospital Day: 11     Assessment & Plan  Traumatic retroperitoneal hematoma  - Sustained from mechanical fall several days prior to initial presentation on 4/2/25, presenting with progressively worsening low back pain. Trauma imaging revealed a large retroperitoneal hematoma with active extravasation and hemoglobin drop. Now s/p transfusion, Kcentra and ultimately IR embolization of the right deep circumflex iliac artery and superior gluteal artery with Dr. Garcia on 4/3/25. Unfortunately, on 4/6/25, his hemoglobin decreased and repeat CT showed a 5 mm inferior gluteal artery branch pseudoaneurysm and he is s/p IR pelvic angiogram on 4/7/25 with Dr. Hernandez, which demonstrated pseudoaneurysms at the distal branches of the left inferior gluteal artery which was embolized.   > 4/15: Hgb 7.2 today (from 7.6 yesterday). Back pain seems controlled. Will be receiving increased Epogen today per IM/Nephro  > Hgb 8.1 on 4/19 labs    - Continue to trend Hgb on CBC closely  - Consider repeat imaging if Hgb continues to downtrend or pain significantly worsens  - Contact IR if there are additional bleeding concerns given multiple prior interventions described above  - Local right hip/groin access site incision care  - Analgesia, see below  - PT and OT  Acute pain due to trauma  - Evaluated by APS on acute care  - Tylenol 975 mg q8h scheduled  - Gabapentin 100 mg three times weekly  - Methocarbamol 750 mg q8h scheduled  - Lidocaine patches daily PRN  - Oxycodone 5 or 10 mg q4h PRN, wean as possible  Impaired mobility and activities of daily living  - Rehabilitation medicine physician for daily monitoring of care, 24 hour availability for acute medical issues, medication management, and therapeutic and diagnostic assessments.  - 24 hour rehabilitation nursing 7 days per week  for: management/teaching of medications, bowel/bladder routine, skin care.  - PT, OT for 2-3 hours per day, 5 to 6 days per week; 15 hours per week  - Rehabilitation Psychology as needed for adjustment and coping  - CM for barriers to discharge, community resources, and family support  - Discharge planning following to help ensure a safe and efficient discharge  - 900/7 program due to HD status, pain, expected therapy tolerance  4/18- Ongoing dispo planning with patient/sister and CM. Staff expressed that patient is progressing well from a functional standpoint but continues to have difficulties with ADLs/iADLs due to impaired vision. Will continue to work towards goals for safe dischare  4/22- DC date now pending outpatient HD setup    L2 vertebral fracture (HCC)  > Sustained from a mechanical fall on 3/24/25, found to have an acute, obliquely oriented fracture of the L2 vertebral body with distraction of the dominant fracture fragment and involvement of the anterior and posterior cortices and superior endplate  > At that time, managed non-operatively with LSO bracing, though it does not appear orthopedic spine surgery or neurosurgery evaluated at the time of injury    - Maintain LSO brace with lumbar spinal precautions   - Should follow-up with spine surgery outpatient  Closed fracture of proximal end of left humerus with routine healing  - Sustained from fall in early February, per CT of the LUE on 2/9/25, there was a minimally displaced fracture of the anterior facet of the greater tuberosity of the left humerus, minimally apparent on repeat x-ray of the shoulder on 3/24/25  - He was evaluated by orthopedic surgery at the time of the initial injury and was recommended to maintain NWB through the LUE with a sling  - There is no further orthopedic evaluation documentation and it does not appear that further follow-up occurred.  - Clinically, he is not describing ongoing significant left arm or shoulder pain and  "has been weight-bearing through the LUE  > 4/14: Repeat humerus X-ray done yesterday shows no change in fracture alignment. Ortho consulted.  >4/15: Ortho c/s: May WBAT to LUE. PT/OT to increase strength and ROM. F/u with Dr. Cisneros in 4 weeks    - Monitor  - PT/OT  Insomnia  Patient reports he has an overall a poor sleep hygiene: he sleeps at 1-2am nightly and watches TV up until bed.  Advised patient to attempt to regulate sleep/wake cycle while here to better participate in therapy. Attempt to try to sleep earlier around midnight  >4/15: reviewed sleep log: Pt went to sleep around midnight and slept for 6 hours.  > 4/17- Poor sleep last night: was awake every other hour. Requiring nap during the day. Can increase melatonin tomorrow if this persists  > 4/18: Poor sleep continues. Pt endorses moving from bed to chair multiple times throughout the night which is his baseline at home too    - Continue sleep log  - Continue melatonin 6mg nightly  At risk for constipation   4/15- Adjusted bowel medications: Colace 100mg BID, Senna 17.2mg with lunch and PRN Miralax/Suppos  4/17- No BM since 4/13. Lactulose this evening if no BM after lunch. Can consider Relistor if no success for opioid-inducted constipation  4/18- small hard BMs x2 yesterday after lactulose and suppository. Continue bowel regimen  > Last BM 4/18 -- will give Relistor x1 this afternoon. If no BM by late afternoon, nursing to give lactulose    - Will follow BMs and adjust bowel regimen to target soft, formed stools q1-2 days, per pt's regular schedule.  Anemia due to chronic kidney disease, on chronic dialysis (HCC)  > 4/14: Hgb 7.6  > 4/15: Hgb 7.2 - Discussed with IM who is coordinating with Nephrology; they will increase his Epoetin jessica  > 4/17: Hgb 7.7  > 4/19: Hgb 8.1    - Trend Hgb on CBC  - Epoetin jessica with dialysis  - Transfusions per IM, appreciate their recommendations  Visual disturbance  - Reporting \"floater\" within the right eye with " "acute onset on 4/11/25 at mid-day while watching TV; describes a dark, \"spider\"-shaped floater that occupies a significant portion of the visual field, painless; has similar floaters in the left eye but smaller  - Ophthalmology consulted on [facility] admission by IM, per prior trauma surgery documentation, Dr. Chaudhry of ophthalmology was made personally aware of clinical situation on 4/11/25 prior to [facility] admission  > Ophthalmology consult 4/12: Exam c/w proliferative diabetic retinopathy with vitreous hemorrhage. Pt will follow up with ophtho immediately after discharge and for referral to a retina specialist  > 4/15- Pt thinks his R eye floater may be larger today, unable to clearly state or quantify symptoms. No other visual complaints of increased number of floaters, double/blurry vision, or bright flashes. Advised patient to inform us if symptoms change - will reach out to Ophthalmology accordingly  > 4/18- No changes noted in vision/floaters. His overall impaired vision continues to be a barrier for his ADLs/iADLs  > 4/21- Stable R eye floater    - Monitor for changes in vision  Diabetic ulcer of left midfoot associated with type 2 diabetes mellitus, limited to breakdown of skin (HCC)  - Has chronic wounds affecting the left foot plantar surface, left foot 1st and 2nd digits  - Left Plantar Foot Wounds and Left toes:  Cleanse with NSS and pat dry. Paint all wounds with Betadine daily and cover plantar open wound with silicone bordered foam dressing. Naren with T for Treatment and change every other day or PRN   > 4/22 : Wound care recs: Paint left foot 1st and 2nd digits with betadine dailyand covering plantar open wound with foam. Podiatry consulted   Hyponatremia  > 4/14: Na 129 - A&O x3, pt mentating appropriately  > 4/15: Na 125 - Discussed with IM who is coordinating with Nephrology; they will manage Na during HD   > 4/17: Na 131  > 4/19: Na 129    - Trend on BMP  - Electrolyte management per nephrology  - " Continue HD in setting of ESRD  ESRD (end stage renal disease) ([facility])  - Continue HD  - Nephrology consulted and following, appreciate their recommendations  >4/22: new outpatient HD center needed close to his home after discharge. CM to follow up. DC pushed  Paroxysmal atrial fibrillation (HCC)  - Currently rate controlled  - Embolic stroke risk reduction with apixaban 5 mg BID    >4/22- Refusing it this morning. Extensive conversation had with patient to discuss the importance of compliance given his high stroke risk  Chronic diastolic (congestive) heart failure ([facility])  Wt Readings from Last 3 Encounters:   04/22/25 95.4 kg (210 lb 6.4 oz)   04/11/25 98.5 kg (217 lb 2.5 oz)   03/24/25 107 kg (234 lb 12.6 oz)     - Most recent TTE from 10/9/24 with EF 60-65%, grade 1 diastolic dysfunction  - Volume management with HD per nephrology  - Monitor weights  - Appreciate medicine and nephrology recommendations and management  Primary hypertension  - Nifedipine  - Appreciate IM and nephrology management  Gout  - Allopurinol for flare prophylaxis  Hyperphosphatemia  > 4/15: Phos 4.6  - Management per nephrology  - Continue phosphate binders  Wound of skin  - Has right anterior pre-tibial scab, wash gently daily, leave open to air  - Wound care RN consulted and following  At high risk for skin breakdown  > 4/21- New sacral pressure injury noted by staff. Pictures reviewed on chart. Patient primarily sleeps in recliner (both here and at home). Discussed with therapy and pt will use a cushion that offers sacral relief on recliner. Will continue to monitor  >4/22- Wound care recs:  Cleanse wound with NSS, pat dry. Apply calcium alginate with silver to wound bed and cover with foam. Change every other day and prn. Cushion will not be covered per therapy for home - advised patient to find one off [employer] to continue offloading     - Moisturize skin daily with skin nourishing cream  - Ehob cushion in chair when out of bed.  -  "Preventative hydraguard to bilateral heels BID and PRN.   - Calazime paste to sacrum/buttocks BID and PRN  - Monitor clinically for breakdown, frequent turns  - Wound care RN consulted and following  At risk for venous thromboembolism (VTE)  - Apixaban, see above  Secondary hyperparathyroidism (HCC)      Subjective   64-year-old with past medical history of acute on chronic anemia, atrial fibrillation, 5 cigarettes, diabetes, end-stage renal disease on dialysis presenting on 4/2 with progressive worsening low back pain after falling and having a large left-sided retroperitoneal hematoma with active extravasation initially treated with Kcentra and stabilized with transfusion and transferred to [facility] for IR evaluation embolization and completed on 4/they will continue to have decreasing hemoglobin found to have a 5 mm inferior gluteal artery branch pseudoaneurysm and did require an additional IR pelvic angiogram with ambulation Dacian on 4/7. Course complicated by acute pain     Chief Complaint: f/u fracture and f/u ambulatory dysfunction    Interval: Patient seen and examined. No acute overnight events. This morning, patient refused his Eliquis, stating it is \"making his vision worse\"; on further elaboration, pt is concerned that Eliquis is not allowing for his vitreous hemorrhage to resolve. However, pt denies any worsening of his floaters or changes in vision. He is asking for a less potent blood thinner like aspirin. Had an extensive conversation with patient about his high risk for stroke given his medical co morbidities and advised that Eliquis is the optimal management. Pt states he will think about it. Podiatry consulted to evaluate his foot wounds as per the recommendations of wound care yesterday. Otherwise, pt denies any other complaints. Last BM 4/18 -- Miralax given this morning by staff. Of note, CM reports DC date needs to be pushed so that new outpatient dialysis center can be set up " with transport for him.      Objective :  Temp:  [97.5 °F (36.4 °C)-98 °F (36.7 °C)] 97.5 °F (36.4 °C)  HR:  [54-64] 54  BP: (136-158)/(56-60) 136/59  Resp:  [16-18] 16  SpO2:  [98 %-100 %] 99 %  O2 Device: None (Room air)    Functional Update:  Mobility: Sup for bed mobility.  Sup ambulation (238') with RW  Transfers: CGA for transfers  with RW  ADLs: CGA oral care, Ind eating, sup UBD, Rin bathing/LBD, maxA footwear, sup toileting    Physical Exam  Vitals reviewed.   Constitutional:       Appearance: Normal appearance.   HENT:      Head: Normocephalic and atraumatic.      Right Ear: External ear normal.      Left Ear: External ear normal.      Nose: Nose normal.   Eyes:      Conjunctiva/sclera: Conjunctivae normal.   Cardiovascular:      Rate and Rhythm: Normal rate and regular rhythm.      Heart sounds: Normal heart sounds.   Pulmonary:      Effort: Pulmonary effort is normal. No respiratory distress.      Breath sounds: Normal breath sounds.   Abdominal:      Palpations: Abdomen is soft.      Comments: Hypoactive bowel sounds   Musculoskeletal:      Cervical back: Normal range of motion.      Comments: Bilateral ACE wraps to LEs   Skin:     General: Skin is warm and dry.   Neurological:      Mental Status: He is alert. Mental status is at baseline.           Scheduled Meds:  Current Facility-Administered Medications   Medication Dose Route Frequency Provider Last Rate    acetaminophen  975 mg Oral Q8H [facility] Damian Cerda MD      allopurinol  100 mg Oral Daily Damian Cerda MD      apixaban  5 mg Oral BID Damian eCrda MD      atorvastatin  80 mg Oral Daily With Dinner Damian Cerda MD      bisacodyl  10 mg Rectal Daily PRN Michelle T Landa, DO      docusate sodium  100 mg Oral BID Michelle T Landa, DO      epoetin jessica  8,000 Units Intravenous After Dialysis German Linton MD      gabapentin  100 mg Oral Once per day on Monday Wednesday Friday Damian Cerda MD      lidocaine  1 patch Topical Daily PRN Damian  MD Prashant      melatonin  6 mg Oral HS Jayden Rowland MD      methocarbamol  1,000 mg Oral Q8H KEMAL Jayden Rowland MD      naloxone  0.04 mg Intravenous Q1MIN PRN Damian Cerda MD      NIFEdipine  30 mg Oral After Dinner Damian Cerda MD      ondansetron  4 mg Oral Q6H PRN Gino Austin PA-C      oxyCODONE  5 mg Oral Q4H PRN Damian Cerda MD      Or    oxyCODONE  10 mg Oral Q4H PRN Damian Cerda MD      oxyCODONE  2.5 mg Oral Q8H PRN Jayden Rowland MD      polyethylene glycol  17 g Oral Daily PRN Michelle Landa, DO      senna  2 tablet Oral Daily With Lunch Michlele Landa, DO      sevelamer  1,600 mg Oral TID With Meals Damian Cerda MD      white petrolatum-mineral oil   Topical TID PRN Michelle Landa, DO           Lab Results: I have reviewed the following results:  Results from last 7 days   Lab Units 04/19/25  0818 04/17/25  1248   HEMOGLOBIN g/dL 8.1* 7.7*   HEMATOCRIT % 26.1* 24.5*   WBC Thousand/uL 10.34* 7.96   PLATELETS Thousands/uL 361 324     Results from last 7 days   Lab Units 04/19/25  0818 04/17/25  1248   BUN mg/dL 37* 47*   SODIUM mmol/L 129* 131*   POTASSIUM mmol/L 4.3 4.2   CHLORIDE mmol/L 95* 94*   CREATININE mg/dL 6.06* 6.83*

## 2025-04-23 ENCOUNTER — APPOINTMENT (INPATIENT)
Dept: NON INVASIVE DIAGNOSTICS | Facility: HOSPITAL | Age: 65
DRG: 939 | End: 2025-04-23
Payer: MEDICARE

## 2025-04-23 PROCEDURE — 93923 UPR/LXTR ART STDY 3+ LVLS: CPT

## 2025-04-23 PROCEDURE — 99232 SBSQ HOSP IP/OBS MODERATE 35: CPT | Performed by: NURSE PRACTITIONER

## 2025-04-23 PROCEDURE — 93923 UPR/LXTR ART STDY 3+ LVLS: CPT | Performed by: SURGERY

## 2025-04-23 PROCEDURE — 97110 THERAPEUTIC EXERCISES: CPT

## 2025-04-23 PROCEDURE — 97530 THERAPEUTIC ACTIVITIES: CPT

## 2025-04-23 PROCEDURE — 97116 GAIT TRAINING THERAPY: CPT

## 2025-04-23 PROCEDURE — 99232 SBSQ HOSP IP/OBS MODERATE 35: CPT | Performed by: STUDENT IN AN ORGANIZED HEALTH CARE EDUCATION/TRAINING PROGRAM

## 2025-04-23 PROCEDURE — 97112 NEUROMUSCULAR REEDUCATION: CPT

## 2025-04-23 RX ORDER — MAGNESIUM HYDROXIDE/ALUMINUM HYDROXICE/SIMETHICONE 120; 1200; 1200 MG/30ML; MG/30ML; MG/30ML
30 SUSPENSION ORAL EVERY 4 HOURS PRN
Status: DISCONTINUED | OUTPATIENT
Start: 2025-04-23 | End: 2025-05-03

## 2025-04-23 RX ORDER — CALCIUM CARBONATE 500 MG/1
500 TABLET, CHEWABLE ORAL DAILY PRN
Status: DISCONTINUED | OUTPATIENT
Start: 2025-04-23 | End: 2025-05-07 | Stop reason: HOSPADM

## 2025-04-23 RX ADMIN — METHOCARBAMOL 1000 MG: 500 TABLET ORAL at 13:46

## 2025-04-23 RX ADMIN — OXYCODONE HYDROCHLORIDE 10 MG: 10 TABLET ORAL at 13:49

## 2025-04-23 RX ADMIN — DOCUSATE SODIUM 100 MG: 100 CAPSULE, LIQUID FILLED ORAL at 07:51

## 2025-04-23 RX ADMIN — ACETAMINOPHEN 975 MG: 325 TABLET, FILM COATED ORAL at 13:46

## 2025-04-23 RX ADMIN — Medication 6 MG: at 21:43

## 2025-04-23 RX ADMIN — OXYCODONE HYDROCHLORIDE 10 MG: 10 TABLET ORAL at 21:50

## 2025-04-23 RX ADMIN — ACETAMINOPHEN 975 MG: 325 TABLET, FILM COATED ORAL at 21:42

## 2025-04-23 RX ADMIN — APIXABAN 5 MG: 5 TABLET, FILM COATED ORAL at 07:52

## 2025-04-23 RX ADMIN — DOCUSATE SODIUM 100 MG: 100 CAPSULE, LIQUID FILLED ORAL at 17:26

## 2025-04-23 RX ADMIN — METHOCARBAMOL 1000 MG: 500 TABLET ORAL at 21:42

## 2025-04-23 RX ADMIN — METHOCARBAMOL 1000 MG: 500 TABLET ORAL at 05:41

## 2025-04-23 RX ADMIN — ACETAMINOPHEN 975 MG: 325 TABLET, FILM COATED ORAL at 05:41

## 2025-04-23 RX ADMIN — ATORVASTATIN CALCIUM 80 MG: 80 TABLET, FILM COATED ORAL at 17:25

## 2025-04-23 RX ADMIN — SEVELAMER HYDROCHLORIDE 1600 MG: 800 TABLET ORAL at 17:25

## 2025-04-23 RX ADMIN — APIXABAN 5 MG: 5 TABLET, FILM COATED ORAL at 17:25

## 2025-04-23 RX ADMIN — CALCIUM CARBONATE (ANTACID) CHEW TAB 500 MG 500 MG: 500 CHEW TAB at 21:45

## 2025-04-23 RX ADMIN — GABAPENTIN 100 MG: 100 CAPSULE ORAL at 07:52

## 2025-04-23 RX ADMIN — SEVELAMER HYDROCHLORIDE 1600 MG: 800 TABLET ORAL at 07:51

## 2025-04-23 RX ADMIN — OXYCODONE HYDROCHLORIDE 10 MG: 10 TABLET ORAL at 00:06

## 2025-04-23 RX ADMIN — NIFEDIPINE 30 MG: 30 TABLET, FILM COATED, EXTENDED RELEASE ORAL at 17:25

## 2025-04-23 RX ADMIN — ALLOPURINOL 100 MG: 100 TABLET ORAL at 07:52

## 2025-04-23 RX ADMIN — SEVELAMER HYDROCHLORIDE 1600 MG: 800 TABLET ORAL at 11:46

## 2025-04-23 RX ADMIN — SENNOSIDES 17.2 MG: 8.6 TABLET, FILM COATED ORAL at 11:46

## 2025-04-23 RX ADMIN — OXYCODONE HYDROCHLORIDE 10 MG: 10 TABLET ORAL at 07:51

## 2025-04-23 NOTE — PROGRESS NOTES
"   04/23/25 0830   Pain Assessment   Pain Score 7   Pain Location/Orientation Orientation: Lower;Location: Back   Hospital Pain Intervention(s) Rest;Repositioned   Restrictions/Precautions   Precautions Bed/chair alarms;Fall Risk;Pain;Supervision on toilet/commode;Spinal precautions   Braces or Orthoses LSO   Cognition   Arousal/Participation Cooperative;Alert   Attention Attends with cues to redirect   Memory Decreased recall of precautions   Following Commands Follows one step commands without difficulty   Subjective   Subjective \"It's my vision that I am mostly worried about\"   Sit to Stand   Type of Assistance Needed Set-up / clean-up;Adaptive equipment   Comment with RW   Sit to Stand CARE Score 5   Bed-Chair Transfer   Type of Assistance Needed Set-up / clean-up;Adaptive equipment   Comment with RW   Chair/Bed-to-Chair Transfer CARE Score 5   Transfer Bed/Chair/Wheelchair   Adaptive Equipment Roller Walker   Walk 10 Feet   Type of Assistance Needed Set-up / clean-up;Adaptive equipment   Comment with RW   Walk 10 Feet CARE Score 5   Walk 50 Feet with Two Turns   Type of Assistance Needed Set-up / clean-up;Adaptive equipment   Comment with RW   Walk 50 Feet with Two Turns CARE Score 5   Walk 150 Feet   Type of Assistance Needed Supervision;Adaptive equipment   Comment with RW, occasional VC for direction in the hallway   Walk 150 Feet CARE Score 4   Ambulation   Primary Mode of Locomotion Prior to Admission Walk   Distance Walked (feet) 300 ft  (X1, 350X 1 , 120 X 1 shorter distances for task)   Assist Device Roller Walker   Gait Pattern Inconsistant Lisa;Decreased foot clearance;Forward Flexion;Step through;Improper weight shift;Wide GARO   Limitations Noted In Balance;Endurance;Heel Strike;Speed   Walk Assist Level Distant Supervision   Does the patient walk? 2. Yes   Wheel 50 Feet with Two Turns   Reason if not Attempted Activity not applicable   Wheel 50 Feet with Two Turns CARE Score 9   Wheel 150 Feet "   Reason if not Attempted Activity not applicable   Wheel 150 Feet CARE Score 9   Wheelchair mobility   Does the patient use a wheelchair? 0. No   Curb or Single Stair   Style negotiated Single stair   Type of Assistance Needed Supervision   Comment B HR on  steps   1 Step (Curb) CARE Score 4   4 Steps   Type of Assistance Needed Supervision   Comment B HR on  steps   4 Steps CARE Score 4   12 Steps   Reason if not Attempted Activity not applicable   12 Steps CARE Score 9   Stairs   Type Stairs   # of Steps 8   Weight Bearing Precautions Fall Risk   Assist Devices Bilateral Rail   Findings 8 consecutive steps on NW9  steps, refused to go to the 4th floor   Therapeutic Interventions   Strengthening Hip abd and hamstring curl with blue TB   Flexibility B hamstring and gastroc stretching   Balance side stepping along rail and ambulation along rail with 1 UE support. Pt. refused to practice walking backwards   Assessment   Treatment Assessment Pt. engaged in 60 mins session and was able to tlerate above activities with his own pacing and choice of activities. Pt. pain is better as he reported having pain meds about 40 mins prior to session. Pt. able to manage 8 consecutive steps today using B HR at CS/S level. No major LOB noted during session . He needed some verbal cues occasionally to avoid obstacled in the hallway due to visual deficits. Pt. expressed that he is worried about his vision but also stated that he will be seeing an optometrist when he gets d/c. Recommended to pt. to consider getting a life alert or to set up tanner or sherice on his phone due to him being alone at home. Pt. receptive and stated that he will think about it.   Problem List Decreased strength;Decreased endurance;Impaired balance;Decreased mobility;Orthopedic restrictions;Pain;Impaired vision   Barriers to Discharge Inaccessible home environment;Decreased caregiver support   PT Barriers   Functional Limitation Car  transfers;Stair negotiation;Standing;Transfers;Walking   Plan   Treatment/Interventions Functional transfer training;LE strengthening/ROM;Elevations;Therapeutic exercise;Endurance training;Patient/family training;Equipment eval/education;Bed mobility;Gait training   Discharge Recommendation   Rehab Resource Intensity Level, PT   (home vs out patient)   Equipment Recommended Walker   PT Therapy Minutes   PT Time In 0830   PT Time Out 0930   PT Total Time (minutes) 60   PT Mode of treatment - Individual (minutes) 60   PT Mode of treatment - Concurrent (minutes) 0   PT Mode of treatment - Group (minutes) 0   PT Mode of treatment - Co-treat (minutes) 0   PT Mode of Treatment - Total time(minutes) 60 minutes   PT Cumulative Minutes 810   Therapy Time missed   Time missed? No

## 2025-04-23 NOTE — PROGRESS NOTES
"Progress Note - Hospitalist   Name: Naresh Carpio 64 y.o. male I MRN: [id number]  Unit/Bed#: -01 I Date of Admission: 4/11/2025   Date of Service: 4/23/2025 I Hospital Day: 12    Assessment & Plan  Traumatic retroperitoneal hematoma  Admitted to the trauma service after a fall with L2 vertebral body and retroperitoneal hematoma.  S/p IR embolization of distal right deep circumflex iliac artery/distal right superior gluteal artery both of which supplied an area of active extravasation and then pseudoaneurysms present at distal branches of the left inferior gluteal artery. The artery was embolized using coils and gelfoam.   He was type and screened 4/17/25 in case needed blood during HD on that day.  Hgb 4/22 improved to 8.4  Anemia due to chronic kidney disease, on chronic dialysis ([facility])  Had 5 U PRBCs during hospital stay  Hemoglobin has been running mostly 7.3-7.4.    On 4/14/25 was 7.6; on 4/15/24 was 7.2 ---> Renal increased his Epogen in light of his hemoglobin  On 4/17, hemoglobin had increased to 7.7  4/19/25 Hgb up to 8.1.   CBC Tuesday in HD 4/22/25 = 8.4 and stable  ESRD (end stage renal disease) ([facility])  Continue Sevelamer TID with meals  Renal following  HD T-TH-Sat while here in rehab  Paroxysmal atrial fibrillation ([facility])  On no rate control medications  Examines RRR  Anticoagulation with Eliquis as at home but he doesn't want to take it because he is worried it will make his right eye vision worse.  I d/w on call Optho Dr Diaz = \"The risk is not quantified in the literature. Dr edmond's note suggests a diagnosis of proliferative diabetic retinopathy and bilateral and recurrent vitreous hemorrhage. So he is at risk for - and has had hemorrhage - whether or not he is on eliquis (assuming dr edmond is correct).     I would always recommend life and death are more important so to do what you recommend re his systemic condition, and then to follow up with dr edmond as he recommended so a real " "exam can be done in the clinic\".   I explained all this to him and he understands  He did take his Eliquis 4/22 and 4/23  Chronic diastolic (congestive) heart failure (HCC)  Appears euvolemic except for LE pitting edema which appears to be chronic  Renal diet  stable  Volume management via HD  Primary hypertension  Continue Procardia XL 30 mg qd  Tx per renal  stable  Visual disturbance  Reported a right eye \"floater\" without associated pain earlier in his hospitalization which has resolved but continued to have blurry central vision.  Ophthalmology saw here in [facility] = visual acuity without correction is 20/400 OU. Dx: Proliferative diabetic retinopathy with vitreous hemorrhage OU as well as cataracts.   Will need outpatient follow up immediately after discharge from rehab  Diabetic ulcer of left midfoot associated with type 2 diabetes mellitus, limited to breakdown of skin (HCC)  Continue local care  Being followed by WC  Last LEADS was 3/28/25 = 50-75% right prox polital and prox tibioperoneal trunk; >75% stenosis in prox popliteal artery on left  Was seen by VS in 12/2024 hospital stay and he declined any intervention at the time  Podiatry saw here on 4/22/25, s/p debridement = no surgical intervention needed at this time  Hyponatremia  Renal following  Continue fluid restriction 1200 cc per 24 hours  Closed fracture of proximal end of left humerus with routine healing  Occurred from a fall in February and eval'd at the time of the injury  Was to continue NWB/sling and followup with Ortho but there was no followup done  Xray 4/13/25 = \"Unchanged alignment of the chronic greater tuberosity avulsion fracture\".   Ortho saw here on 4/14 = WBAT; see Dr Cisneros 4 weeks  Gout  Continue Allopurinol  L2 vertebral fracture (HCC)  CT CAP 4/2: Fracture of L2 vertebral body extending to the superior endplate  Continue LSO brace  Insomnia  Patient reports not sleeping well at night because he has trouble getting comfortable. " He has been trying to sleep in the recliner     The above assessment and plan was reviewed and updated as determined by my evaluation of the patient on 4/23/2025.    History of Present Illness   Patient seen and examined. Patients overnight issues or events were reviewed with nursing staff. New or overnight issues include the following:   No new or overnight issues.  Offers no complaints.    Review of Systems   All other systems reviewed and are negative.      Objective :  Temp:  [97.8 °F (36.6 °C)-98.3 °F (36.8 °C)] 98.3 °F (36.8 °C)  HR:  [60-75] 66  BP: (117-169)/(58-81) 142/63  Resp:  [16-18] 17  SpO2:  [97 %-100 %] 100 %  O2 Device: None (Room air)    Invasive Devices       Hemodialysis Catheter  Duration             HD Permanent Double Catheter 602 days                    Physical Exam  General Appearance: no distress, non toxic appearing  HEENT: PERRLA, conjuctiva normal; oropharynx clear; mucous membranes moist   Neck:  Supple, normal ROM  Lungs: CTA, normal respiratory effort, no retractions, expiratory effort normal  CV: regular rate and rhythm; no rubs/murmurs/gallops, PMI normal   ABD: soft; ND/NT; +BS  EXT: no edema  Skin: normal turgor, normal texture  Psych: affect normal, mood normal  Neuro: AAO        The above physical exam was reviewed and updated as determined by my evaluation of the patient on 4/23/2025.      Lab Results: I have reviewed the following results:  Results from last 7 days   Lab Units 04/22/25  1237 04/19/25  0818   WBC Thousand/uL 7.27 10.34*   HEMOGLOBIN g/dL 8.4* 8.1*   HEMATOCRIT % 26.4* 26.1*   PLATELETS Thousands/uL 327 361     Results from last 7 days   Lab Units 04/22/25  1237 04/19/25  0818   SODIUM mmol/L 129* 129*   POTASSIUM mmol/L 4.3 4.3   CHLORIDE mmol/L 89* 95*   CO2 mmol/L 29 23   BUN mg/dL 48* 37*   CREATININE mg/dL 7.18* 6.06*   CALCIUM mg/dL 8.9 9.2                   Imaging Results Review: No pertinent imaging studies reviewed.  Other Study Results Review: No  additional pertinent studies reviewed.    Review of Scheduled Meds: Medications  reviewed and reconciled as needed  Current Facility-Administered Medications   Medication Dose Route Frequency Provider Last Rate    acetaminophen  975 mg Oral Q8H [facility] Damian Cerda MD      allopurinol  100 mg Oral Daily Damian Cerda MD      apixaban  5 mg Oral BID Damian Cerda MD      atorvastatin  80 mg Oral Daily With Dinner Damian Cerda MD      bisacodyl  10 mg Rectal Daily PRN Michelle T Landa, DO      docusate sodium  100 mg Oral BID Michelle T Landa, DO      epoetin jessica  8,000 Units Intravenous After Dialysis German Linton MD      gabapentin  100 mg Oral Once per day on Monday Wednesday Friday Damian Cerda MD      lidocaine  1 patch Topical Daily PRN Damian Cerda MD      melatonin  6 mg Oral HS Jayden Rowland MD      methocarbamol  1,000 mg Oral Q8H [facility] Jayden Rowland MD      naloxone  0.04 mg Intravenous Q1MIN PRN Damian Cerda MD      NIFEdipine  30 mg Oral After Dinner Damian Cerda MD      ondansetron  4 mg Oral Q6H PRN Gino Austin PA-C      oxyCODONE  5 mg Oral Q4H PRN Damian Cerda MD      Or    oxyCODONE  10 mg Oral Q4H PRN Damian Cerda MD      oxyCODONE  2.5 mg Oral Q8H PRN Jayden Rowland MD      polyethylene glycol  17 g Oral Daily PRN Michelle T Landa, DO      senna  2 tablet Oral Daily With Lunch Michelle T Landa, DO      sevelamer  1,600 mg Oral TID With Meals Damian Cerda MD      white petrolatum-mineral oil   Topical TID PRN Michelle T Landa, DO         VTE Pharmacologic Prophylaxis: Eliquis  Code Status: Level 1 - Full Code  Current Length of Stay: 12 day(s)    Administrative Statements     ** Please Note:  voice to text software may have been used in the creation of this document. Although proof errors in transcription or interpretation are a potential of such software**

## 2025-04-23 NOTE — ASSESSMENT & PLAN NOTE
Continue local care  Being followed by WC  Last LEADS was 3/28/25 = 50-75% right prox polital and prox tibioperoneal trunk; >75% stenosis in prox popliteal artery on left  Was seen by VS in 12/2024 hospital stay and he declined any intervention at the time  Podiatry saw here on 4/22/25, s/p debridement = no surgical intervention needed at this time

## 2025-04-23 NOTE — PLAN OF CARE
Problem: PAIN - ADULT  Goal: Verbalizes/displays adequate comfort level or baseline comfort level  Description: Interventions:- Encourage patient to monitor pain and request assistance- Assess pain using appropriate pain scale- Administer analgesics based on type and severity of pain and evaluate response- Implement non-pharmacological measures as appropriate and evaluate response- Consider cultural and social influences on pain and pain management- Notify physician/advanced practitioner if interventions unsuccessful or patient reports new pain  Outcome: Progressing     Problem: INFECTION - ADULT  Goal: Absence or prevention of progression during hospitalization  Description: INTERVENTIONS:- Assess and monitor for signs and symptoms of infection- Monitor lab/diagnostic results- Monitor all insertion sites, i.e. indwelling lines, tubes, and drains- Monitor endotracheal if appropriate and nasal secretions for changes in amount and color- [place] appropriate cooling/warming therapies per order- Administer medications as ordered- Instruct and encourage patient and family to use good hand hygiene technique- Identify and instruct in appropriate isolation precautions for identified infection/condition  Outcome: Progressing  Goal: Absence of fever/infection during neutropenic period  Description: INTERVENTIONS:- Monitor WBC  Outcome: Progressing     Problem: SAFETY ADULT  Goal: Patient will remain free of falls  Description: INTERVENTIONS:- Educate patient/family on patient safety including physical limitations- Instruct patient to call for assistance with activity - Consult OT/PT to assist with strengthening/mobility - Keep Call bell within reach- Keep bed low and locked with side rails adjusted as appropriate- Keep care items and personal belongings within reach- Initiate and maintain comfort rounds- Make Fall Risk Sign visible to staff- Offer Toileting every 2 Hours, in advance of need- Initiate/Maintain alarm-  Obtain necessary fall risk management equipment: - Apply yellow socks and bracelet for high fall risk patients- Consider moving patient to room near nurses station  Outcome: Progressing  Goal: Maintain or return to baseline ADL function  Description: INTERVENTIONS:-  Assess patient's ability to carry out ADLs; assess patient's baseline for ADL function and identify physical deficits which impact ability to perform ADLs (bathing, care of mouth/teeth, toileting, grooming, dressing, etc.)- Assess/evaluate cause of self-care deficits - Assess range of motion- Assess patient's mobility; develop plan if impaired- Assess patient's need for assistive devices and provide as appropriate- Encourage maximum independence but intervene and supervise when necessary- Involve family in performance of ADLs- Assess for home care needs following discharge - Consider OT consult to assist with ADL evaluation and planning for discharge- Provide patient education as appropriate  Outcome: Progressing  Goal: Maintains/Returns to pre admission functional level  Description: INTERVENTIONS:- Perform AM-PAC 6 Click Basic Mobility/ Daily Activity assessment daily.- Set and communicate daily mobility goal to care team and patient/family/caregiver. - Collaborate with rehabilitation services on mobility goals if consulted- Perform Range of Motion 3 times a day.- Reposition patient every 2 hours.- Dangle patient 3 times a day- Stand patient 3 times a day- Ambulate patient 3 times a day- Out of bed to chair 3 times a day - Out of bed for meals 3 times a day- Out of bed for toileting- Record patient progress and toleration of activity level   Outcome: Progressing     Problem: DISCHARGE PLANNING  Goal: Discharge to home or other facility with appropriate resources  Description: INTERVENTIONS:- Identify barriers to discharge w/patient and caregiver- Arrange for needed discharge resources and transportation as appropriate- Identify discharge learning  needs (meds, wound care, etc.)- Arrange for interpretive services to assist at discharge as needed- Refer to Case Management Department for coordinating discharge planning if the patient needs post-hospital services based on physician/advanced practitioner order or complex needs related to functional status, cognitive ability, or social support system  Outcome: Progressing     Problem: Prexisting or High Potential for Compromised Skin Integrity  Goal: Skin integrity is maintained or improved  Description: INTERVENTIONS:- Identify patients at risk for skin breakdown- Assess and monitor skin integrity- Assess and monitor nutrition and hydration status- Monitor labs - Assess for incontinence - Turn and reposition patient- Assist with mobility/ambulation- Relieve pressure over bony prominences- Avoid friction and shearing- Provide appropriate hygiene as needed including keeping skin clean and dry- Evaluate need for skin moisturizer/barrier cream- Collaborate with interdisciplinary team - Patient/family teaching- Consider wound care consult   Outcome: Progressing     Problem: METABOLIC, FLUID AND ELECTROLYTES - ADULT  Goal: Electrolytes maintained within normal limits  Description: INTERVENTIONS:- Monitor labs and assess patient for signs and symptoms of electrolyte imbalances- Administer electrolyte replacement as ordered- Monitor response to electrolyte replacements, including repeat lab results as appropriate- Instruct patient on fluid and nutrition as appropriate  Outcome: Progressing  Goal: Fluid balance maintained  Description: INTERVENTIONS:- Monitor labs - Monitor I/O and WT- Instruct patient on fluid and nutrition as appropriate- Assess for signs & symptoms of volume excess or deficit  Outcome: Progressing     Problem: Nutrition/Hydration-ADULT  Goal: Nutrient/Hydration intake appropriate for improving, restoring or maintaining nutritional needs  Description: Monitor and assess patient's nutrition/hydration  status for malnutrition. Collaborate with interdisciplinary team and initiate plan and interventions as ordered.  Monitor patient's weight and dietary intake as ordered or per policy. Utilize nutrition screening tool and intervene as necessary. Determine patient's food preferences and provide high-protein, high-caloric foods as appropriate. INTERVENTIONS:- Monitor oral intake, urinary output, labs, and treatment plans- Assess nutrition and hydration status and recommend course of action- Evaluate amount of meals eaten- Assist patient with eating if necessary - Allow adequate time for meals- Recommend/ encourage appropriate diets, oral nutritional supplements, and vitamin/mineral supplements- Order, calculate, and assess calorie counts as needed- Recommend, monitor, and adjust tube feedings and TPN/PPN based on assessed needs- Assess need for intravenous fluids- Provide specific nutrition/hydration education as appropriate- Include patient/family/caregiver in decisions related to nutrition  Outcome: Progressing     Problem: Nutrition/Hydration-ADULT  Goal: Nutrient/Hydration intake appropriate for improving, restoring or maintaining nutritional needs  Description: Monitor and assess patient's nutrition/hydration status for malnutrition. Collaborate with interdisciplinary team and initiate plan and interventions as ordered.  Monitor patient's weight and dietary intake as ordered or per policy. Utilize nutrition screening tool and intervene as necessary. Determine patient's food preferences and provide high-protein, high-caloric foods as appropriate. INTERVENTIONS:- Monitor oral intake, urinary output, labs, and treatment plans- Assess nutrition and hydration status and recommend course of action- Evaluate amount of meals eaten- Assist patient with eating if necessary - Allow adequate time for meals- Recommend/ encourage appropriate diets, oral nutritional supplements, and vitamin/mineral supplements- Order, calculate,  and assess calorie counts as needed- Recommend, monitor, and adjust tube feedings and TPN/PPN based on assessed needs- Assess need for intravenous fluids- Provide specific nutrition/hydration education as appropriate- Include patient/family/caregiver in decisions related to nutrition  Outcome: Progressing

## 2025-04-23 NOTE — QUICK NOTE
Attempted to see patient but he is on physical therapy.  Plan for dialysis tomorrow      Joselyn Reyes Bahamonde, MD  Nephrology Attending

## 2025-04-23 NOTE — PROGRESS NOTES
"   04/23/25 1330   Pain Assessment   Pain Score 7   Pain Location/Orientation Location: Back   Hospital Pain Intervention(s) Rest;Emotional support  (nurse gave pt. robaxin and tylenol during session)   Restrictions/Precautions   Precautions Fall Risk;Fluid restriction;Supervision on toilet/commode;Spinal precautions;Visual deficit   Braces or Orthoses LSO   General   Change In Medical/Functional Status (S)  Pt. now off chair alarms and is allowed to stand mod I with RW prn to offload buttocks, will still need to ring for assistance if will be using the bathroom. OT in agreement and nurse earnestine aware. pt. able to demonstrate understanding   Cognition   Overall Cognitive Status Impaired   Arousal/Participation Cooperative;Responsive  (sleepy/ tired)   Attention Attends with cues to redirect   Orientation Level Oriented X4   Memory Decreased recall of precautions   Following Commands Follows one step commands without difficulty   Subjective   Subjective \"I'm tired\"   Sit to Stand   Type of Assistance Needed Adaptive equipment;Independent   Comment with RW   Sit to Stand CARE Score 6   Therapeutic Interventions   Strengthening seated LAQ and hip flex, add squeeze with ball, functional sit to stand from recliner chair   Assessment   Treatment Assessment Pt. engaged in short 30 mins session and was able to participate in seated thera ex as he c/o fatigue after all his therapy earlier today. Pt. able to demonstrate understanding on him having indepencen to stand from recliner to offload pressure off buttocks but will need to ring call bell for assistance if he needs to use the bathroom. All needs in place including RW within reach at end of session.   Problem List Decreased strength;Decreased endurance;Impaired balance;Decreased mobility;Orthopedic restrictions;Pain;Impaired vision   Barriers to Discharge Inaccessible home environment;Decreased caregiver support   PT Barriers   Functional Limitation Car transfers;Stair " negotiation;Standing;Transfers;Walking   Plan   Treatment/Interventions Functional transfer training;LE strengthening/ROM;Elevations;Endurance training;Therapeutic exercise;Patient/family training;Equipment eval/education;Bed mobility;Gait training   Discharge Recommendation   Equipment Recommended Walker   PT Therapy Minutes   PT Time In 1330   PT Time Out 1400   PT Total Time (minutes) 30   PT Mode of treatment - Individual (minutes) 30   PT Mode of treatment - Concurrent (minutes) 0   PT Mode of treatment - Group (minutes) 0   PT Mode of treatment - Co-treat (minutes) 0   PT Mode of Treatment - Total time(minutes) 30 minutes   PT Cumulative Minutes 840   Therapy Time missed   Time missed? No

## 2025-04-23 NOTE — ASSESSMENT & PLAN NOTE
4/15- Adjusted bowel medications: Colace 100mg BID, Senna 17.2mg with lunch and PRN Miralax/Suppos  4/17- No BM since 4/13. Lactulose this evening if no BM after lunch. Can consider Relistor if no success for opioid-inducted constipation  4/18- small hard BMs x2 yesterday after lactulose and suppository. Continue bowel regimen  >/22 but required Relistor and suppository/lactulose    - Will follow BMs and adjust bowel regimen to target soft, formed stools q1-2 days, per pt's regular schedule.

## 2025-04-23 NOTE — PROGRESS NOTES
"OT daily treatment note       04/23/25 1030   Pain Assessment   Pain Assessment Tool 0-10   Pain Score 7   Pain Location/Orientation Location: Back   Hospital Pain Intervention(s) Repositioned;Rest   Restrictions/Precautions   Precautions Bed/chair alarms;Fall Risk;Pain;Spinal precautions;Visual deficit  (plan to remove off alarms this afternoon)   Braces or Orthoses LSO   Lifestyle   Autonomy \"Youre not listening to me. I cant see!\"   Lower Body Dressing   Comment time spent assisting with donning BLE ace wraps due to them being removed for an inroom procedure   Cognition   Overall Cognitive Status Impaired   Arousal/Participation Responsive;Cooperative   Attention Attends with cues to redirect   Orientation Level Oriented X4   Memory Decreased recall of precautions   Following Commands Follows one step commands without difficulty   Additional Activities   Additional Activities Other (Comment)   Additional Activities Comments time spent discussing DC plan with pt and Dr Guan. Pt stated he is agreeable to changing dialysis centers understanding he needs the transportation so if that means having to change dialysis centers then it is fine. Pt then inquired about having someone assist him at home initially - OT edu that sister will likely have to help at first to allow him to adjust to being at home. pt expressed concern w/ not being able to use his phone so questioning how he can go home if he cant use his phone. OT reminded pt that over the weekend, he was assisting with enlarging his phone - pt states he still cannot see and call his sister if need be. OT spent time trying to see what accessibility features his phone has. He does have a talk back feature but it doesnt necessarily help because he still has to know where he is pressing on his phone. OT would like to see if his phone can be set up with google assist that way he can doesnt need to use the screen to make phone calls. OT to f/u this PM.   Activity " Tolerance   Activity Tolerance Patient limited by fatigue   Assessment   Treatment Assessment Pt participated in skilled OT session with focus on DC planning and modifying phone for visual defiicts. See flowsheet for details of session and current functional status. Pt is limited by decreased endurance, increased fall risk, pain, and visual deficits and requires skilled OT services to increase independence and safety with ADL completion in prep for DC home. Plan to continue ADL retraining, functional transfer training, UE strengthening/ROM, endurance training, cognitive reorientation, Pt/caregiver education, compensatory technique education, continued education, energy conservation, and activity engagement  to address barriers mentioned above.   Prognosis Good   Problem List Decreased strength;Decreased endurance;Impaired balance;Decreased mobility;Impaired judgement;Decreased safety awareness;Impaired vision;Pain;Orthopedic restrictions   Barriers to Discharge Inaccessible home environment;Decreased caregiver support   Plan   Treatment/Interventions ADL retraining;Functional transfer training;Therapeutic exercise;Endurance training;Cognitive reorientation;Patient/family training;Equipment eval/education;Compensatory technique education   Progress Progressing toward goals   OT Therapy Minutes   OT Time In 1030   OT Time Out 1111   OT Total Time (minutes) 41   OT Mode of treatment - Individual (minutes) 41   OT Mode of treatment - Concurrent (minutes) 0   OT Mode of treatment - Group (minutes) 0   OT Mode of treatment - Co-treat (minutes) 0   OT Mode of Treatment - Total time(minutes) 41 minutes   OT Cumulative Minutes 740   Therapy Time missed   Time missed? No

## 2025-04-23 NOTE — ASSESSMENT & PLAN NOTE
Admitted to the trauma service after a fall with L2 vertebral body and retroperitoneal hematoma.  S/p IR embolization of distal right deep circumflex iliac artery/distal right superior gluteal artery both of which supplied an area of active extravasation and then pseudoaneurysms present at distal branches of the left inferior gluteal artery. The artery was embolized using coils and gelfoam.   He was type and screened 4/17/25 in case needed blood during HD on that day.  Hgb 4/22 improved to 8.4

## 2025-04-23 NOTE — ASSESSMENT & PLAN NOTE
Had 5 U PRBCs during hospital stay  Hemoglobin has been running mostly 7.3-7.4.    On 4/14/25 was 7.6; on 4/15/24 was 7.2 ---> Renal increased his Epogen in light of his hemoglobin  On 4/17, hemoglobin had increased to 7.7  4/19/25 Hgb up to 8.1.   CBC Tuesday in HD 4/22/25 = 8.4 and stable

## 2025-04-23 NOTE — ASSESSMENT & PLAN NOTE
"On no rate control medications  Examines RRR  Anticoagulation with Eliquis as at home but he doesn't want to take it because he is worried it will make his right eye vision worse.  I d/w on call Optho Dr Diaz = \"The risk is not quantified in the literature. Dr edmond's note suggests a diagnosis of proliferative diabetic retinopathy and bilateral and recurrent vitreous hemorrhage. So he is at risk for - and has had hemorrhage - whether or not he is on eliquis (assuming dr edmond is correct).     I would always recommend life and death are more important so to do what you recommend re his systemic condition, and then to follow up with dr edmond as he recommended so a real exam can be done in the clinic\".   I explained all this to him and he understands  He did take his Eliquis 4/22 and 4/23  "

## 2025-04-23 NOTE — PROGRESS NOTES
Progress Note - PMR   Name: Naresh Carpio 64 y.o. male I MRN: [id number]  Unit/Bed#: -01 I Date of Admission: 4/11/2025   Date of Service: 4/23/2025 I Hospital Day: 12     Assessment & Plan  Traumatic retroperitoneal hematoma  - Sustained from mechanical fall several days prior to initial presentation on 4/2/25, presenting with progressively worsening low back pain. Trauma imaging revealed a large retroperitoneal hematoma with active extravasation and hemoglobin drop. Now s/p transfusion, Kcentra and ultimately IR embolization of the right deep circumflex iliac artery and superior gluteal artery with Dr. Garcia on 4/3/25. Unfortunately, on 4/6/25, his hemoglobin decreased and repeat CT showed a 5 mm inferior gluteal artery branch pseudoaneurysm and he is s/p IR pelvic angiogram on 4/7/25 with Dr. Hernandez, which demonstrated pseudoaneurysms at the distal branches of the left inferior gluteal artery which was embolized.   > 4/15: Hgb 7.2 today (from 7.6 yesterday). Back pain seems controlled. Will be receiving increased Epogen today per IM/Nephro  > Hgb 8.1 on 4/19 labs    - Continue to trend Hgb on CBC closely  - Consider repeat imaging if Hgb continues to downtrend or pain significantly worsens  - Contact IR if there are additional bleeding concerns given multiple prior interventions described above  - Local right hip/groin access site incision care  - Analgesia, see below  - PT and OT  Acute pain due to trauma  - Evaluated by APS on acute care  - Tylenol 975 mg q8h scheduled  - Gabapentin 100 mg three times weekly  - Methocarbamol 750 mg q8h scheduled  - Lidocaine patches daily PRN  - Oxycodone 5 or 10 mg q4h PRN, wean as possible  Impaired mobility and activities of daily living  - Rehabilitation medicine physician for daily monitoring of care, 24 hour availability for acute medical issues, medication management, and therapeutic and diagnostic assessments.  - 24 hour rehabilitation nursing 7 days per week  for: management/teaching of medications, bowel/bladder routine, skin care.  - PT, OT for 2-3 hours per day, 5 to 6 days per week; 15 hours per week  - Rehabilitation Psychology as needed for adjustment and coping  - CM for barriers to discharge, community resources, and family support  - Discharge planning following to help ensure a safe and efficient discharge  - 900/7 program due to HD status, pain, expected therapy tolerance  4/18- Ongoing dispo planning with patient/sister and CM. Staff expressed that patient is progressing well from a functional standpoint but continues to have difficulties with ADLs/iADLs due to impaired vision. Will continue to work towards goals for safe dischare  4/22- DC date now pending outpatient HD setup    L2 vertebral fracture (HCC)  > Sustained from a mechanical fall on 3/24/25, found to have an acute, obliquely oriented fracture of the L2 vertebral body with distraction of the dominant fracture fragment and involvement of the anterior and posterior cortices and superior endplate  > At that time, managed non-operatively with LSO bracing, though it does not appear orthopedic spine surgery or neurosurgery evaluated at the time of injury    - Maintain LSO brace with lumbar spinal precautions   - Should follow-up with spine surgery outpatient  Closed fracture of proximal end of left humerus with routine healing  - Sustained from fall in early February, per CT of the LUE on 2/9/25, there was a minimally displaced fracture of the anterior facet of the greater tuberosity of the left humerus, minimally apparent on repeat x-ray of the shoulder on 3/24/25  - He was evaluated by orthopedic surgery at the time of the initial injury and was recommended to maintain NWB through the LUE with a sling  - There is no further orthopedic evaluation documentation and it does not appear that further follow-up occurred.  - Clinically, he is not describing ongoing significant left arm or shoulder pain and  "has been weight-bearing through the LUE  > 4/14: Repeat humerus X-ray done yesterday shows no change in fracture alignment. Ortho consulted.  >4/15: Ortho c/s: May WBAT to LUE. PT/OT to increase strength and ROM. F/u with Dr. Cisneros in 4 weeks    - Monitor  - PT/OT  Insomnia  Patient reports he has an overall a poor sleep hygiene: he sleeps at 1-2am nightly and watches TV up until bed.  Advised patient to attempt to regulate sleep/wake cycle while here to better participate in therapy. Attempt to try to sleep earlier around midnight  >4/15: reviewed sleep log: Pt went to sleep around midnight and slept for 6 hours.  > 4/17- Poor sleep last night: was awake every other hour. Requiring nap during the day. Can increase melatonin tomorrow if this persists  > 4/18: Poor sleep continues. Pt endorses moving from bed to chair multiple times throughout the night which is his baseline at home too    - Continue sleep log  - Continue melatonin 6mg nightly  At risk for constipation   4/15- Adjusted bowel medications: Colace 100mg BID, Senna 17.2mg with lunch and PRN Miralax/Suppos  4/17- No BM since 4/13. Lactulose this evening if no BM after lunch. Can consider Relistor if no success for opioid-inducted constipation  4/18- small hard BMs x2 yesterday after lactulose and suppository. Continue bowel regimen  >/22 but required Relistor and suppository/lactulose    - Will follow BMs and adjust bowel regimen to target soft, formed stools q1-2 days, per pt's regular schedule.  Anemia due to chronic kidney disease, on chronic dialysis (HCC)  > 4/14: Hgb 7.6  > 4/15: Hgb 7.2 - Discussed with IM who is coordinating with Nephrology; they will increase his Epoetin jessica  > 4/17: Hgb 7.7  > 4/19: Hgb 8.1    - Trend Hgb on CBC  - Epoetin jessica with dialysis  - Transfusions per IM, appreciate their recommendations  Visual disturbance  - Reporting \"floater\" within the right eye with acute onset on 4/11/25 at mid-day while watching TV; " "describes a dark, \"spider\"-shaped floater that occupies a significant portion of the visual field, painless; has similar floaters in the left eye but smaller  - Ophthalmology consulted on [facility] admission by IM, per prior trauma surgery documentation, Dr. Chaudhry of ophthalmology was made personally aware of clinical situation on 4/11/25 prior to [facility] admission  > Ophthalmology consult 4/12: Exam c/w proliferative diabetic retinopathy with vitreous hemorrhage. Pt will follow up with ophtho immediately after discharge and for referral to a retina specialist  > 4/15- Pt thinks his R eye floater may be larger today, unable to clearly state or quantify symptoms. No other visual complaints of increased number of floaters, double/blurry vision, or bright flashes. Advised patient to inform us if symptoms change - will reach out to Ophthalmology accordingly  > 4/18- No changes noted in vision/floaters. His overall impaired vision continues to be a barrier for his ADLs/iADLs  > 4/20-23- Stable R eye floater    - Monitor for changes in vision  Diabetic ulcer of left midfoot associated with type 2 diabetes mellitus, limited to breakdown of skin (HCC)  - Has chronic wounds affecting the left foot plantar surface, left foot 1st and 2nd digits  - Left Plantar Foot Wounds and Left toes:  Cleanse with NSS and pat dry. Paint all wounds with Betadine daily and cover plantar open wound with silicone bordered foam dressing. Naren with T for Treatment and change every other day or PRN   > 4/22 : Wound care recs: Paint left foot 1st and 2nd digits with betadine dailyand covering plantar open wound with foam. Podiatry consulted   Hyponatremia  > 4/14: Na 129 - A&O x3, pt mentating appropriately  > 4/15: Na 125 - Discussed with IM who is coordinating with Nephrology; they will manage Na during HD   > 4/17: Na 131  > 4/19: Na 129    - Trend on BMP  - Electrolyte management per nephrology  - Continue HD in setting of ESRD  ESRD (end stage renal " disease) (HCC)  - Continue HD  - Nephrology consulted and following, appreciate their recommendations  >4/22: new outpatient HD center needed close to his home after discharge. CM to follow up. DC pushed  Paroxysmal atrial fibrillation (HCC)  - Currently rate controlled  - Embolic stroke risk reduction with apixaban 5 mg BID    >4/22- Refusing it this morning. Extensive conversation had with patient to discuss the importance of compliance given his high stroke risk  Chronic diastolic (congestive) heart failure (HCC)  Wt Readings from Last 3 Encounters:   04/22/25 95.4 kg (210 lb 6.4 oz)   04/11/25 98.5 kg (217 lb 2.5 oz)   03/24/25 107 kg (234 lb 12.6 oz)     - Most recent TTE from 10/9/24 with EF 60-65%, grade 1 diastolic dysfunction  - Volume management with HD per nephrology  - Monitor weights  - Appreciate medicine and nephrology recommendations and management  Primary hypertension  - Nifedipine  - Appreciate IM and nephrology management  Gout  - Allopurinol for flare prophylaxis  Hyperphosphatemia  > 4/15: Phos 4.6  - Management per nephrology  - Continue phosphate binders  Wound of skin  - Has right anterior pre-tibial scab, wash gently daily, leave open to air  - Wound care RN consulted and following  At high risk for skin breakdown  > 4/21- New sacral pressure injury noted by staff. Pictures reviewed on chart. Patient primarily sleeps in recliner (both here and at home). Discussed with therapy and pt will use a cushion that offers sacral relief on recliner. Will continue to monitor  >4/22- Wound care recs:  Cleanse wound with NSS, pat dry. Apply calcium alginate with silver to wound bed and cover with foam. Change every other day and prn. Cushion will not be covered per therapy for home - advised patient to find one off [employer] to continue offloading     - Moisturize skin daily with skin nourishing cream  - Ehob cushion in chair when out of bed.  - Preventative hydraguard to bilateral heels BID and PRN.   -  Calazime paste to sacrum/buttocks BID and PRN  - Monitor clinically for breakdown, frequent turns  - Wound care RN consulted and following  At risk for venous thromboembolism (VTE)  - Apixaban, see above  Secondary hyperparathyroidism (HCC)      Subjective   64-year-old male with history of chronic anemia, atrial fibrillation, diabetes, end-stage renal disease on HD presenting on 4/2 with progressive worsening back pain with fall found to have left-sided retroperitoneal hematoma with active extravasation status post Kcentra and stabilization transfusions required multiple IR embolizations. Course complicated by acute on chronic pain     Chief Complaint: f/u ambulatory dysfunction    Interval: Patient seen and evaluated in room.  Discussed his overall discharge planning and how we are working with case management in order to help develop the discharge date.  We need to arrange for appropriate assistive transport to and from dialysis and that would require us to changing his dialysis facility due to the transportation not going on for counting lines.  He also endorses difficulty with his vision we did talk that he cannot have further assessment and treatment till he is outpatient for ophthalmology to do their testing in the office.  Still attempts to provide new barriers every time solutions are found to his current situation.  Sister will have to help at times with groceries and basic tasks if she is able to as well as perhaps doctors visits a time.  Would benefit from her being at the house initially in order to help acclimate the patient.  Last bowel movement was on 4/22 after a fairly aggressive regimen.  Eating and voiding    Objective :  Temp:  [97.8 °F (36.6 °C)-98.3 °F (36.8 °C)] 98.3 °F (36.8 °C)  HR:  [60-75] 66  BP: (117-169)/(58-81) 142/63  Resp:  [16-18] 17  SpO2:  [97 %-100 %] 100 %  O2 Device: None (Room air)    Functional Update:  Physical Therapy Occupational Therapy Speech Therapy   Weight Bearing  Status: Full Weight Bearing  Transfers: Supervision  Bed Mobility: Supervision  Amulation Distance (ft): 150 feet  Ambulation: Supervision  Assistive Device for Ambulation: Roller Walker  Number of Stairs: 4  Assistive Device for Stairs: Bilateral Hand Rails  Stair Assistance: Minimal Assistance  Discharge Recommendations: Home with:  DC Home with:: First Floor Setup, Family Support, Home Physical Therapy   Eating: Independent  Grooming: Minimal Assistance  Bathing: Minimal Assistance  Bathing: Minimal Assistance  Upper Body Dressing: Minimal Assistance  Lower Body Dressing: Moderate Assistance  Toileting: Maximum Assistance  Tub/Shower Transfer: Supervision  Toilet Transfer: Supervision  Cognition: Exceptions to WNL  Cognition: Decreased Memory, Decreased Executive Functions, Decreased Safety, Impulsive, Behavioral Considerations  Orientation: Person, Place, Situation               Physical Exam  Vitals reviewed.   Constitutional:       General: He is not in acute distress.  HENT:      Head: Normocephalic and atraumatic.      Right Ear: External ear normal.      Left Ear: External ear normal.      Nose: Nose normal. No rhinorrhea.      Mouth/Throat:      Mouth: Mucous membranes are moist.      Pharynx: Oropharynx is clear.   Eyes:      General: No scleral icterus.  Cardiovascular:      Rate and Rhythm: Normal rate.      Pulses: Normal pulses.   Pulmonary:      Effort: Pulmonary effort is normal. No respiratory distress.   Abdominal:      General: There is no distension.      Palpations: Abdomen is soft.   Musculoskeletal:      Right lower leg: No edema.      Left lower leg: No edema.   Skin:     General: Skin is warm and dry.   Neurological:      Mental Status: He is alert and oriented to person, place, and time.      Sensory: Sensory deficit present.   Psychiatric:         Mood and Affect: Mood normal.         Behavior: Behavior normal.           Scheduled Meds:  Current Facility-Administered Medications    Medication Dose Route Frequency Provider Last Rate    acetaminophen  975 mg Oral Q8H Counts include 234 beds at the [facility] Damian Cerda MD      allopurinol  100 mg Oral Daily Damian Cerda MD      apixaban  5 mg Oral BID Damian Cerda MD      atorvastatin  80 mg Oral Daily With Dinner Damian Cerda MD      bisacodyl  10 mg Rectal Daily PRN Michelle T Landa, DO      docusate sodium  100 mg Oral BID Michelle T Landa, DO      epoetin jessica  8,000 Units Intravenous After Dialysis German Linton MD      gabapentin  100 mg Oral Once per day on Monday Wednesday Friday Damian Cerda MD      lidocaine  1 patch Topical Daily PRN Damian Cerda MD      melatonin  6 mg Oral HS Jayden Rowland MD      methocarbamol  1,000 mg Oral Q8H Counts include 234 beds at the [facility] Jayden Rowland MD      naloxone  0.04 mg Intravenous Q1MIN PRN Damian Cerda MD      NIFEdipine  30 mg Oral After Dinner Damian Cerda MD      ondansetron  4 mg Oral Q6H PRN Gino Austin PA-C      oxyCODONE  5 mg Oral Q4H PRN Damian Cerda MD      Or    oxyCODONE  10 mg Oral Q4H PRN Damian Cerda MD      oxyCODONE  2.5 mg Oral Q8H PRN Jayden Rowland MD      polyethylene glycol  17 g Oral Daily PRN Michelle T Landa, DO      senna  2 tablet Oral Daily With Lunch Michelle Landa, DO      sevelamer  1,600 mg Oral TID With Meals Damian Cerda MD      white petrolatum-mineral oil   Topical TID PRN Michelle T Landa, DO           Lab Results: I have reviewed the following results:  Results from last 7 days   Lab Units 04/22/25  1237 04/19/25  0818 04/17/25  1248   HEMOGLOBIN g/dL 8.4* 8.1* 7.7*   HEMATOCRIT % 26.4* 26.1* 24.5*   WBC Thousand/uL 7.27 10.34* 7.96   PLATELETS Thousands/uL 327 361 324     Results from last 7 days   Lab Units 04/22/25  1237 04/19/25  0818 04/17/25  1248   BUN mg/dL 48* 37* 47*   SODIUM mmol/L 129* 129* 131*   POTASSIUM mmol/L 4.3 4.3 4.2   CHLORIDE mmol/L 89* 95* 94*   CREATININE mg/dL 7.18* 6.06* 6.83*                Santos Guan,   Physical Medicine and Rehabilitation  [facility]  [facility]    I have spent a total time of 35 minutes in caring for this patient on the day of the visit/encounter including Counseling / Coordination of care, Documenting in the medical record, and Communicating with other healthcare professionals .

## 2025-04-23 NOTE — ASSESSMENT & PLAN NOTE
"- Reporting \"floater\" within the right eye with acute onset on 4/11/25 at mid-day while watching TV; describes a dark, \"spider\"-shaped floater that occupies a significant portion of the visual field, painless; has similar floaters in the left eye but smaller  - Ophthalmology consulted on [facility] admission by IM, per prior trauma surgery documentation, Dr. Chaudhry of ophthalmology was made personally aware of clinical situation on 4/11/25 prior to [facility] admission  > Ophthalmology consult 4/12: Exam c/w proliferative diabetic retinopathy with vitreous hemorrhage. Pt will follow up with ophtho immediately after discharge and for referral to a retina specialist  > 4/15- Pt thinks his R eye floater may be larger today, unable to clearly state or quantify symptoms. No other visual complaints of increased number of floaters, double/blurry vision, or bright flashes. Advised patient to inform us if symptoms change - will reach out to Ophthalmology accordingly  > 4/18- No changes noted in vision/floaters. His overall impaired vision continues to be a barrier for his ADLs/iADLs  > 4/20-23- Stable R eye floater    - Monitor for changes in vision  "

## 2025-04-23 NOTE — PROGRESS NOTES
"OT daily tx note     04/23/25 1230   Pain Assessment   Pain Assessment Tool 0-10   Pain Score 7   Pain Location/Orientation Location: Back   Hospital Pain Intervention(s) Emotional support;Rest   Restrictions/Precautions   Precautions Cognitive;Fluid restriction;Fall Risk;Pain;Spinal precautions;Supervision on toilet/commode;Visual deficit  (bed alarms removed; nsg notified only for offloading)   Weight Bearing Restrictions No   ROM Restrictions Yes  (lumbar spinal prec)   Braces or Orthoses LSO   Lifestyle   Autonomy \"this doesn't make sense; it's not my kitchen\"   Sit to Stand   Type of Assistance Needed Set-up / clean-up   Physical Assistance Level No physical assistance   Comment w/ RW   Sit to Stand CARE Score 5   Bed-Chair Transfer   Type of Assistance Needed Set-up / clean-up   Physical Assistance Level No physical assistance   Comment w/ RW   Chair/Bed-to-Chair Transfer CARE Score 5   Meal Prep   Meal Prep Level Walker   Meal Prep Level of Assistance Distant supervision   Meal Preparation Pt participated in meal prep/kitchen mobility to assess level of ind. Discussed kitchen setup w/ pt and simulated similar setup. Pt completed using RW demo fair safety insight and able to navigate nearby barriers appropriately. At times, pt neglects RW and walks w/ A from countertops. OT encouraged pt to utilize RW more freq due to visual impairment and imp balance. Pt receptive but wants to return to PLOF when only using RW if needed rather than all the time. If pushed too hard w/ rec, pt grow frustrated and will refuse to participate so gradual VC given t/o task. Pt prepared a egg and demo appropriate sequencing, safety, problem solving, and activity tolerance throughout cooking task. Some frustration noted due to different setup from home and cooking utensils not adequate for cooking the way he wanted to. Despite visual imp, pt able to ambulate around kitchen w/ G mobility and no LOB/SOB noted. Explained chair could be " utilized for rest breaks but pt able to tolerate >10 min of standing. Universal walker tray utilized during session but not utilized as often due to inconsistent use of RW. At this time, Pt completed w/ DS and min VC for safety and looking to advance pt to mod I by DC if applicable. Pt benefits from activity simulation to identify any barriers and address them as needed to ensure safety and promote ind upon DC.   Cognition   Overall Cognitive Status Impaired   Arousal/Participation Responsive;Cooperative   Attention Attends with cues to redirect   Orientation Level Oriented X4   Memory Decreased recall of precautions   Following Commands Follows one step commands without difficulty   Additional Activities   Additional Activities Other (Comment)   Additional Activities Comments Spent some time this session installing Google Assistant on his personal cell phone to assist w/ navigating phone features.  OT edu pt on use of kaya and pt able to trial on his own by directing google assist to call ex-wife, Mercedes. Pt very appreciative w/ new kaya and encouraged to cont to utilize out of therapy and can cont to work on in therapy for using other features like setting alarms for meds, accessing photos from messages, and other misc tasks. Pt benefits from using kaya to facilitate ease in transitioning to home w/ new visual impairment.   Activity Tolerance   Activity Tolerance Patient limited by fatigue   Assessment   Treatment Assessment Pt engaged in skilled OT session with focus on IADL training , Kitchen Mobilty, Meal preparation, Functional Transfers, Functional Cognition, Functional Attention, Standing tolerance, Standing balance , Energy conservation training/education, healthy coping education, Leisure and social pursuits, and community re-integration. Pt is limited by weakness, impaired balance, decreased endurance, increased fall risk, decreased ADLS, decreased IADLS, pain, decreased activity tolerance, decreased safety  awareness, and impaired judgement. Session focused on IADLs specifically meal prep to which pt performed better than expected w/ some minor safety risks such as neglecting use of RW. Google assistance installed on pt's phone to compensate for visual imp w/ good outcome. Will cont to f/u w/ kaya and address IADLs. Pt taken off of alarms during the day so pt can stand to offload pressure from sacrum and nsg notified of change. OT services are warranted to address above barriers.   Prognosis Good   Problem List Decreased strength;Decreased endurance;Impaired balance;Decreased mobility;Impaired judgement;Decreased safety awareness;Impaired vision;Pain;Orthopedic restrictions   Barriers to Discharge Inaccessible home environment;Decreased caregiver support   Plan   Treatment/Interventions ADL retraining;Functional transfer training;Therapeutic exercise;Endurance training;Patient/family training   Progress Progressing toward goals   OT Therapy Minutes   OT Time In 1230   OT Time Out 1330   OT Total Time (minutes) 60   OT Mode of treatment - Individual (minutes) 60   OT Mode of treatment - Concurrent (minutes) 0   OT Mode of treatment - Group (minutes) 0   OT Mode of treatment - Co-treat (minutes) 0   OT Mode of Treatment - Total time(minutes) 60 minutes   OT Cumulative Minutes 800   Therapy Time missed   Time missed? No

## 2025-04-24 ENCOUNTER — APPOINTMENT (INPATIENT)
Dept: DIALYSIS | Facility: HOSPITAL | Age: 65
DRG: 939 | End: 2025-04-24
Attending: STUDENT IN AN ORGANIZED HEALTH CARE EDUCATION/TRAINING PROGRAM
Payer: MEDICARE

## 2025-04-24 PROCEDURE — 86706 HEP B SURFACE ANTIBODY: CPT | Performed by: NURSE PRACTITIONER

## 2025-04-24 PROCEDURE — 97110 THERAPEUTIC EXERCISES: CPT

## 2025-04-24 PROCEDURE — 99232 SBSQ HOSP IP/OBS MODERATE 35: CPT | Performed by: STUDENT IN AN ORGANIZED HEALTH CARE EDUCATION/TRAINING PROGRAM

## 2025-04-24 PROCEDURE — 97530 THERAPEUTIC ACTIVITIES: CPT

## 2025-04-24 PROCEDURE — 87340 HEPATITIS B SURFACE AG IA: CPT | Performed by: NURSE PRACTITIONER

## 2025-04-24 PROCEDURE — 99223 1ST HOSP IP/OBS HIGH 75: CPT | Performed by: SURGERY

## 2025-04-24 PROCEDURE — 99232 SBSQ HOSP IP/OBS MODERATE 35: CPT | Performed by: NURSE PRACTITIONER

## 2025-04-24 PROCEDURE — 86704 HEP B CORE ANTIBODY TOTAL: CPT | Performed by: NURSE PRACTITIONER

## 2025-04-24 PROCEDURE — 86803 HEPATITIS C AB TEST: CPT | Performed by: NURSE PRACTITIONER

## 2025-04-24 PROCEDURE — 86705 HEP B CORE ANTIBODY IGM: CPT | Performed by: NURSE PRACTITIONER

## 2025-04-24 PROCEDURE — 99232 SBSQ HOSP IP/OBS MODERATE 35: CPT | Performed by: PODIATRIST

## 2025-04-24 RX ADMIN — NIFEDIPINE 30 MG: 30 TABLET, FILM COATED, EXTENDED RELEASE ORAL at 16:56

## 2025-04-24 RX ADMIN — SENNOSIDES 17.2 MG: 8.6 TABLET, FILM COATED ORAL at 11:31

## 2025-04-24 RX ADMIN — ATORVASTATIN CALCIUM 80 MG: 80 TABLET, FILM COATED ORAL at 16:55

## 2025-04-24 RX ADMIN — DOCUSATE SODIUM 100 MG: 100 CAPSULE, LIQUID FILLED ORAL at 08:40

## 2025-04-24 RX ADMIN — OXYCODONE HYDROCHLORIDE 10 MG: 10 TABLET ORAL at 06:46

## 2025-04-24 RX ADMIN — ACETAMINOPHEN 975 MG: 325 TABLET, FILM COATED ORAL at 05:57

## 2025-04-24 RX ADMIN — CALCIUM CARBONATE (ANTACID) CHEW TAB 500 MG 500 MG: 500 CHEW TAB at 11:31

## 2025-04-24 RX ADMIN — SEVELAMER HYDROCHLORIDE 1600 MG: 800 TABLET ORAL at 16:57

## 2025-04-24 RX ADMIN — ALLOPURINOL 100 MG: 100 TABLET ORAL at 08:41

## 2025-04-24 RX ADMIN — EPOETIN ALFA 8000 UNITS: 4000 SOLUTION INTRAVENOUS; SUBCUTANEOUS at 14:57

## 2025-04-24 RX ADMIN — Medication 6 MG: at 22:01

## 2025-04-24 RX ADMIN — OXYCODONE HYDROCHLORIDE 10 MG: 10 TABLET ORAL at 12:52

## 2025-04-24 RX ADMIN — ALUMINUM HYDROXIDE, MAGNESIUM HYDROXIDE, AND SIMETHICONE 30 ML: 200; 200; 20 SUSPENSION ORAL at 13:51

## 2025-04-24 RX ADMIN — METHOCARBAMOL 1000 MG: 500 TABLET ORAL at 22:01

## 2025-04-24 RX ADMIN — DOCUSATE SODIUM 100 MG: 100 CAPSULE, LIQUID FILLED ORAL at 16:56

## 2025-04-24 RX ADMIN — OXYCODONE HYDROCHLORIDE 10 MG: 10 TABLET ORAL at 22:01

## 2025-04-24 RX ADMIN — APIXABAN 5 MG: 5 TABLET, FILM COATED ORAL at 08:41

## 2025-04-24 RX ADMIN — OXYCODONE HYDROCHLORIDE 10 MG: 10 TABLET ORAL at 16:55

## 2025-04-24 RX ADMIN — APIXABAN 5 MG: 5 TABLET, FILM COATED ORAL at 16:55

## 2025-04-24 RX ADMIN — ACETAMINOPHEN 975 MG: 325 TABLET, FILM COATED ORAL at 22:01

## 2025-04-24 RX ADMIN — METHOCARBAMOL 1000 MG: 500 TABLET ORAL at 05:57

## 2025-04-24 RX ADMIN — SEVELAMER HYDROCHLORIDE 1600 MG: 800 TABLET ORAL at 11:31

## 2025-04-24 RX ADMIN — OXYCODONE HYDROCHLORIDE 10 MG: 10 TABLET ORAL at 02:43

## 2025-04-24 RX ADMIN — SEVELAMER HYDROCHLORIDE 1600 MG: 800 TABLET ORAL at 08:41

## 2025-04-24 NOTE — ASSESSMENT & PLAN NOTE
Wt Readings from Last 3 Encounters:   04/24/25 94.3 kg (208 lb)   04/11/25 98.5 kg (217 lb 2.5 oz)   03/24/25 107 kg (234 lb 12.6 oz)     - Most recent TTE from 10/9/24 with EF 60-65%, grade 1 diastolic dysfunction  - Volume management with HD per nephrology  - Monitor weights  - Appreciate medicine and nephrology recommendations and management

## 2025-04-24 NOTE — ASSESSMENT & PLAN NOTE
> 4/14: Na 129 - A&O x3, pt mentating appropriately  > 4/15: Na 125 - Discussed with IM who is coordinating with Nephrology; they will manage Na during HD   > Sodium levels continue to fluctuate from 125-131. Pt remains A&O x3. Nephrology will continue to manage electrolyte deficiencies    - Trend on BMP  - Electrolyte management per nephrology  - Continue HD in setting of ESRD

## 2025-04-24 NOTE — PROGRESS NOTES
Progress Note - Nephrology   Name: Naresh Carpio 64 y.o. male I MRN: [id number]  Unit/Bed#: -01 I Date of Admission: 4/11/2025   Date of Service: 4/24/2025 I Hospital Day: 13     Assessment & Plan  ESRD (end stage renal disease) (HCC)  #ESRD on HD TTS:  Dialysis unit/days: [facility]  Access: PermCath  Plan for dialysis today as per schedule  Fluid restriction 1.8 L a day  Adjust medications to GFR<10  Avoid opioids     Primary hypertension  Volume: Euvolemic  Blood pressure: Normotensive, /66  Continue with low-sodium diet   Continue nifedipine 30 mg  UF on HD  Anemia due to chronic kidney disease, on chronic dialysis (HCC)  Current hemoglobin: 8.1 mg/dL  Treatment:  Outpatient Epogen  Transfuse for hemoglobin less than 7.0 per primary service      Traumatic retroperitoneal hematoma  S/p IR embolization  resolved  Secondary hyperparathyroidism (HCC)  Continue Sevelamer with meals      I have reviewed the nephrology recommendations including hemodialysis today, with primary team, and we are in agreement with renal plan including the information outlined above. I have discussed the above management plan in detail with the primary service.     Subjective   Brief History of Admission - 63 yo man with PMH of ESRD on dialysis TTS admitted after a fall gated with hematoma. Nephrology is consulted for management of ESRD     Patient complains of  foot pain well getting dressing changed.  No shortness of breath    Objective :  Temp:  [97.8 °F (36.6 °C)-98.1 °F (36.7 °C)] 98 °F (36.7 °C)  HR:  [60-64] 62  BP: (124-146)/(55-66) 124/66  Resp:  [17-18] 18  SpO2:  [97 %-99 %] 97 %  O2 Device: None (Room air)    Current Weight: Weight - Scale: 94.3 kg (208 lb)  First Weight: Weight - Scale: 98.9 kg (218 lb)  I/O         04/22 0701 04/23 0700 04/23 0701 04/24 0700 04/24 0701 04/25 0700    P.O.  220     I.V. (mL/kg) 500 (5.2)      Total Intake(mL/kg) 500 (5.2) 220 (2.3)     Urine (mL/kg/hr)  100 (0)     Other 2500       Stool 0      Total Output 2500 100     Net -2000 +120            Unmeasured Stool Occurrence 1 x            Physical Exam  General:  no acute distress at this time  Skin:  No acute rash  Eyes:  No scleral icterus and noninjected  ENT:  mucous membranes moist  Neck:  no carotid bruits  Chest:  Clear to auscultation percussion, good respiratory effort, no use of accessory respiratory muscles  CVS:  Regular rate and rhythm without rub   Abdomen:  soft and nontender   Extremities: no significant lower extremity edema  Neuro:  No gross focality  Psych:  Alert , cooperative   Dialysis access: PermCath    Medications:    Current Facility-Administered Medications:     acetaminophen (TYLENOL) tablet 975 mg, 975 mg, Oral, Q8H KEMAL, Damian Cerda MD, 975 mg at 04/24/25 0557    allopurinol (ZYLOPRIM) tablet 100 mg, 100 mg, Oral, Daily, Damian Cerda MD, 100 mg at 04/24/25 0841    aluminum-magnesium hydroxide-simethicone (MAALOX) oral suspension 30 mL, 30 mL, Oral, Q4H PRN, GRANT Church    apixaban (ELIQUIS) tablet 5 mg, 5 mg, Oral, BID, Damian Cerda MD, 5 mg at 04/24/25 0841    atorvastatin (LIPITOR) tablet 80 mg, 80 mg, Oral, Daily With Dinner, Damian Cerda MD, 80 mg at 04/23/25 1725    bisacodyl (DULCOLAX) rectal suppository 10 mg, 10 mg, Rectal, Daily PRN, Michelle Ngoel, DO, 10 mg at 04/18/25 0000    calcium carbonate (TUMS) chewable tablet 500 mg, 500 mg, Oral, Daily PRN, GRANT Church, 500 mg at 04/23/25 2145    docusate sodium (COLACE) capsule 100 mg, 100 mg, Oral, BID, Michelle ABURTO Landa DO, 100 mg at 04/24/25 0840    epoetin jessica (EPOGEN,PROCRIT) injection 8,000 Units, 8,000 Units, Intravenous, After Dialysis, German Linton MD, 8,000 Units at 04/22/25 1547    gabapentin (NEURONTIN) capsule 100 mg, 100 mg, Oral, Once per day on Monday Wednesday Friday, Damian Cerda MD, 100 mg at 04/23/25 0752    lidocaine (LIDODERM) 5 % patch 1 patch, 1 patch, Topical, Daily PRN, Damian Cerda MD, 1 patch at 04/18/25  "2135    melatonin tablet 6 mg, 6 mg, Oral, HS, Jayden Rowland MD, 6 mg at 04/23/25 2143    methocarbamol (ROBAXIN) tablet 1,000 mg, 1,000 mg, Oral, Q8H KEMAL, Jayden Rowland MD, 1,000 mg at 04/24/25 0557    naloxone (NARCAN) 0.04 mg/mL syringe 0.04 mg, 0.04 mg, Intravenous, Q1MIN PRN, Damian Cerda MD    NIFEdipine (PROCARDIA XL) 24 hr tablet 30 mg, 30 mg, Oral, After Dinner, Damian Cerda MD, 30 mg at 04/23/25 1725    ondansetron (ZOFRAN-ODT) dispersible tablet 4 mg, 4 mg, Oral, Q6H PRN, Gino Austin PA-C, 4 mg at 04/20/25 0023    oxyCODONE (ROXICODONE) IR tablet 5 mg, 5 mg, Oral, Q4H PRN, 5 mg at 04/22/25 2006 **OR** oxyCODONE (ROXICODONE) immediate release tablet 10 mg, 10 mg, Oral, Q4H PRN, Damian Cerda MD, 10 mg at 04/24/25 0646    oxyCODONE (ROXICODONE) split tablet 2.5 mg, 2.5 mg, Oral, Q8H PRN, Jayden Rowland MD, 2.5 mg at 04/22/25 1014    polyethylene glycol (MIRALAX) packet 17 g, 17 g, Oral, Daily PRN, Michelle Landa DO, 17 g at 04/17/25 0845    senna (SENOKOT) tablet 17.2 mg, 2 tablet, Oral, Daily With Lunch, Michelle Landa DO, 17.2 mg at 04/23/25 1146    sevelamer (RENAGEL) tablet 1,600 mg, 1,600 mg, Oral, TID With Meals, Damian Cerda MD, 1,600 mg at 04/24/25 0841    white petrolatum-mineral oil (EUCERIN,HYDROCERIN) cream, , Topical, TID PRN, Michelle Landa DO      Lab Results: I have reviewed the following results:  Results from last 7 days   Lab Units 04/22/25  1237 04/19/25  0818 04/17/25  1248   WBC Thousand/uL 7.27 10.34* 7.96   HEMOGLOBIN g/dL 8.4* 8.1* 7.7*   HEMATOCRIT % 26.4* 26.1* 24.5*   PLATELETS Thousands/uL 327 361 324   POTASSIUM mmol/L 4.3 4.3 4.2   CHLORIDE mmol/L 89* 95* 94*   CO2 mmol/L 29 23 25   BUN mg/dL 48* 37* 47*   CREATININE mg/dL 7.18* 6.06* 6.83*   CALCIUM mg/dL 8.9 9.2 9.2       Administrative Statements     Portions of the record may have been created with voice recognition software. Occasional wrong word or \"sound a like\" substitutions may have occurred " due to the inherent limitations of voice recognition software. Read the chart carefully and recognize, using context, where substitutions have occurred.If you have any questions, please contact the dictating provider.

## 2025-04-24 NOTE — ASSESSMENT & PLAN NOTE
- Sustained from mechanical fall several days prior to initial presentation on 4/2/25, presenting with progressively worsening low back pain. Trauma imaging revealed a large retroperitoneal hematoma with active extravasation and hemoglobin drop. Now s/p transfusion, Kcentra and ultimately IR embolization of the right deep circumflex iliac artery and superior gluteal artery with Dr. Garcia on 4/3/25. Unfortunately, on 4/6/25, his hemoglobin decreased and repeat CT showed a 5 mm inferior gluteal artery branch pseudoaneurysm and he is s/p IR pelvic angiogram on 4/7/25 with Dr. Hernandez, which demonstrated pseudoaneurysms at the distal branches of the left inferior gluteal artery which was embolized.   > 4/15: Hgb 7.2 today (from 7.6 yesterday). Back pain seems controlled. Will be receiving increased Epogen today per IM/Nephro  > Hgb 8.4 on 4/22 labs    - Continue to trend Hgb on CBC closely  - Consider repeat imaging if Hgb continues to downtrend or pain significantly worsens  - Contact IR if there are additional bleeding concerns given multiple prior interventions described above  - Local right hip/groin access site incision care  - Analgesia, see below  - PT and OT

## 2025-04-24 NOTE — ASSESSMENT & PLAN NOTE
#ESRD on HD TTS:  Dialysis unit/days: [facility]  Access: PermCath  Plan for dialysis today as per schedule  Fluid restriction 1.8 L a day  Adjust medications to GFR<10  Avoid opioids

## 2025-04-24 NOTE — PROGRESS NOTES
Podiatry - Progress Note  Patient: Naresh Carpio 64 y.o. male   MRN: [id number]  PCP: Jeffrey Jackson  Unit/Bed#: -01 Encounter: [id number]  Date Of Visit: 04/24/25    ASSESSMENT:    Naresh Carpio is a 64 y.o. male with:    Left submetatarsal 5 diabetic ulceration to level of subcutaneous tissue, Delgadillo 2  Left medial 1st MTPJ diabetic ulceration to the level of subcutaneous tissue, Delgadillo 2  Right medial midfoot diabetic ulceration to the level of subcutaneous tissue, Delgadillo 2  Multiple stable dry eschars to B/L feet/legs   Type 2 diabetes with peripheral neuropathy (A1c 5.7%)  End-stage renal disease on hemodialysis      PLAN:    Patient seen and evaluated bedside.  Bilateral lower extremity wounds remain stable with no acute clinical signs of infection.  No evidence of osteomyelitis on x-ray.  ABIs and toe pressures are within healing range.  Patient is being followed by the wound care team for management of lower extremity wounds.  No podiatric surgical intervention anticipated, podiatry to sign off at this time.  Please reconsult as needed.  Continue local wound care, appreciate nursing assistance with dressing changes.  Reviewed left foot x-rays and agree with final read: Grossly intact fifth metatarsal without bony destruction nor periosteal reaction visualized.  Previous partial amputation fifth digit.  Severe midfoot and hindfoot degenerative hypertrophic changes.  Distal tibial old healed trauma.  First MTP osteoarthritis.  Vascular calcifications.  Reviewed lower extremity ABIs: Left lower limb SALVATORE noncompressible, metatarsal pressure 155 mmHg, great toe pressure 72 mmHg.  Right lower limb noncompressible SALVATORE, metatarsal pressure 158 mmHg, second toe pressure 101 mmHg  Elevation and offloading on green foam wedges or pillows when non-ambulatory.  Rest of care per primary team.     Weightbearing status: Weightbearing as tolerated bilaterally    SUBJECTIVE:     The patient was seen, evaluated, and  "assessed at bedside today. The patient was awake, alert, and in no acute distress. No acute events overnight. The patient reports he is tired today.  Denies pain in the bilateral lower extremities. Patient denies N/V/F/chills/SOB/CP.      OBJECTIVE:     Vitals:   /66 (BP Location: Left arm)   Pulse 62   Temp 98 °F (36.7 °C) (Oral)   Resp 18   Ht 6' 1\" (1.854 m)   Wt 94.3 kg (208 lb)   SpO2 97%   BMI 27.44 kg/m²     Temp (24hrs), Av °F (36.7 °C), Min:97.8 °F (36.6 °C), Max:98.1 °F (36.7 °C)      Physical Exam:     Lungs: Non labored breathing  Abdomen: Soft, non-tender.  Lower Extremity:  Cardiovascular status at baseline from admission.  Neurological status at baseline from admission.  Musculoskeletal status at baseline from admission. No calf tenderness noted.     Wound #: 1  Location: Left submet 5  Length 3 cm: Width 1.0 cm: Depth 0.3 cm:   Deepest Tissue Noted in Base: Subcutaneous  Probe to Bone: No  Peripheral Skin Description: Attached  Granulation: 90% Fibrotic Tissue: 10% Necrotic Tissue: 0%   Drainage Amount: Mild serous  Signs of Infection: No    Wound #: 2  Location: Left medial first MTPJ  Length 1 cm: Width 0.5 cm: Depth 0.2cm:   Deepest Tissue Noted in Base: Subcutaneous  Probe to Bone: No  Peripheral Skin Description: Attached  Granulation: 90% Fibrotic Tissue: 10% Necrotic Tissue: 0%   Drainage Amount: Mild serous   Signs of Infection: No    Wound #: 3  Location: Right medial midfoot  Length 0.5 cm: Width 1.5 cm: Depth 0.2 cm:   Deepest Tissue Noted in Base: Subcutaneous  Probe to Bone: No  Peripheral Skin Description: Attached  Granulation: 90% Fibrotic Tissue: 10% Necrotic Tissue: 0%   Drainage Amount: Moderate serosanguineous  Signs of Infection: No    Bilateral lower extremities: Multiple stable dry eschar's to the legs.  No signs of active infection to the bilateral lower extremities: No purulence, malodor, ascending erythema, crepitus, fluctuance appreciated    Clinical Images " "04/24/25:    Left submet 5      Right medial midfoot    Additional Data:     Labs:    Results from last 7 days   Lab Units 04/22/25  1237   WBC Thousand/uL 7.27   HEMOGLOBIN g/dL 8.4*   HEMATOCRIT % 26.4*   PLATELETS Thousands/uL 327   SEGS PCT % 63   LYMPHO PCT % 18   MONO PCT % 11   EOS PCT % 5     Results from last 7 days   Lab Units 04/22/25  1237   POTASSIUM mmol/L 4.3   CHLORIDE mmol/L 89*   CO2 mmol/L 29   BUN mg/dL 48*   CREATININE mg/dL 7.18*   CALCIUM mg/dL 8.9           * I Have Reviewed All Lab Data Listed Above.    Recent Cultures (last 7 days):               Imaging: I have personally reviewed pertinent films in PACS  EKG, Pathology, and Other Studies: I have personally reviewed pertinent reports.    ** Please Note: Portions of the record may have been created with voice recognition software. Occasional wrong word or \"sound a like\" substitutions may have occurred due to the inherent limitations of voice recognition software. Read the chart carefully and recognize, using context, where substitutions have occurred. **      "

## 2025-04-24 NOTE — PLAN OF CARE
Target UF Goal 3 L as tolerated. Patient dialyzing for  3.5 hours  on 3 K bath for serum K of  4.3  per protocol. Treatment plan reviewed with Nephrology.       Post-Dialysis RN Treatment Note    Blood Pressure:  Pre 151/69 mm/Hg  Post 149/67 mmHg   EDW  96.5 kg    Weight:  Pre 95.6 kg   Post 94.3 kg   Mode of weight measurement: Standing Scale   Volume Removed  1052 ml    Treatment duration  1.75 hours   NS given  No    Treatment shortened? Yes, describe: Per patient request   Medications given during Rx Maalox 30ml, Epogen 8000 units   Estimated Kt/V  0.67   Access type: Permacath/TDC   Access Issues: No   Needle Gauge: N/A   Report called to primary nurse   Yes, Marizol Vieira LPN           Problem: METABOLIC, FLUID AND ELECTROLYTES - ADULT  Goal: Electrolytes maintained within normal limits  Description: INTERVENTIONS:- Monitor labs and assess patient for signs and symptoms of electrolyte imbalances- Administer electrolyte replacement as ordered- Monitor response to electrolyte replacements, including repeat lab results as appropriate- Instruct patient on fluid and nutrition as appropriate  Outcome: Progressing  Goal: Fluid balance maintained  Description: INTERVENTIONS:- Monitor labs - Monitor I/O and WT- Instruct patient on fluid and nutrition as appropriate- Assess for signs & symptoms of volume excess or deficit  Outcome: Progressing

## 2025-04-24 NOTE — ASSESSMENT & PLAN NOTE
"On no rate control medications  Examines RRR  Anticoagulation with Eliquis as at home but he doesn't want to take it because he is worried it will make his right eye vision worse.  I d/w on call Optho Dr Diaz = \"The risk is not quantified in the literature. Dr edmond's note suggests a diagnosis of proliferative diabetic retinopathy and bilateral and recurrent vitreous hemorrhage. So he is at risk for - and has had hemorrhage - whether or not he is on eliquis (assuming dr edmond is correct).     I would always recommend life and death are more important so to do what you recommend re his systemic condition, and then to follow up with dr edmond as he recommended so a real exam can be done in the clinic\".   I explained all this to him and he understands  He did take his Eliquis 4/23, this AM 4/24 but didn't take 4/22 or evening of 4/21.  "

## 2025-04-24 NOTE — ASSESSMENT & PLAN NOTE
Volume: Euvolemic  Blood pressure: Normotensive, /66  Continue with low-sodium diet   Continue nifedipine 30 mg  UF on HD

## 2025-04-24 NOTE — ASSESSMENT & PLAN NOTE
4/15- Adjusted bowel medications: Colace 100mg BID, Senna 17.2mg with lunch and PRN Miralax/Suppos  4/17- No BM since 4/13. Lactulose this evening if no BM after lunch. Can consider Relistor if no success for opioid-inducted constipation  4/18- small hard BMs x2 yesterday after lactulose and suppository. Continue bowel regimen  >Last BM 4/22 but required Relistor and suppository/lactulose    - Will follow BMs and adjust bowel regimen to target soft, formed stools q1-2 days, per pt's regular schedule.

## 2025-04-24 NOTE — ASSESSMENT & PLAN NOTE
> Non invasive arterial studies from 3/28:  Diffuse disease noted throughout the femoral-popliteal arteries. Evidence of 50-75% stenosis in the proximal popliteal artery. Evidence of 50-75% stenosis at the proximal tibio-peroneal trunk.   LLE: Diffuse disease noted throughout the femoral-popliteal arteries. Evidence of >75% stenosis in the proximal popliteal artery.  Tech Note: Patient refused SALVATORE.  SALVATORE 4/22: Left lower limb SALVATORE noncompressible, metatarsal pressure 155 mmHg, great toe pressure 72 mmHg. Right lower limb noncompressible SALVATORE, metatarsal pressure 158 mmHg, second toe pressure 101 mmHg   Vascular surgery consult pending

## 2025-04-24 NOTE — TEAM CONFERENCE
Acute RehabilitationTeam Conference Note  Date: 4/24/2025   Time: 10:48 AM       Patient Name:  Naresh Carpio       Medical Record Number: [id number]   YOB: 1960  Sex: Male          Room/Bed:  [facility] 964-01  Payor Info:  Payor: MEDICARE / Plan: MEDICARE A AND B / Product Type: Medicare A & B Fee for Service /      Admitting Diagnosis: Traumatic retroperitoneal hemorrhage [S36.899A]   Admit Date/Time:  4/11/2025  6:19 PM  Admission Comments: No comment available     Primary Diagnosis:  Traumatic retroperitoneal hematoma  Principal Problem: Traumatic retroperitoneal hematoma    Patient Active Problem List    Diagnosis Date Noted    Insomnia 04/13/2025    Hyperphosphatemia 04/12/2025    Wound of skin 04/12/2025    At high risk for skin breakdown 04/12/2025    At risk for venous thromboembolism (VTE) 04/12/2025    Impaired mobility and activities of daily living 04/12/2025    Visual disturbance 04/11/2025    Pseudoaneurysm (HCC) 04/08/2025    Acute pain due to trauma 04/05/2025    ESRD on dialysis ([facility]) 04/04/2025    Wounds, multiple 04/04/2025    Traumatic retroperitoneal hemorrhage 04/03/2025    Secondary hyperparathyroidism ([facility]) 04/03/2025    At risk for electrolyte imbalance 04/03/2025    Multiple open wounds of foot 03/26/2025    Disorder of acid-base balance 03/24/2025    L2 vertebral fracture ([facility]) 03/24/2025    Non-compliance with treatment 03/24/2025    Generalized weakness 02/24/2025    Gout 02/18/2025    Closed fracture of proximal end of left humerus with routine healing 02/10/2025    Left shoulder pain 02/08/2025    ESRD (end stage renal disease) on dialysis ([facility]) 01/16/2025    Primary hypertension 01/16/2025    Anemia in ESRD (end-stage renal disease)  ([facility]) 01/16/2025    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, on chronic dialysis ([facility]) 01/16/2025    Renal lesion 12/19/2024    Chronic wound 11/20/2024    Chronic diastolic  (congestive) heart failure ([facility]) 11/19/2024    Pleural effusion 11/19/2024    Paroxysmal atrial fibrillation ([facility])     ESRD (end stage renal disease) ([facility]) 10/25/2024    Hyponatremia 10/19/2024    Acute on chronic anemia 10/14/2024    PAD (peripheral artery disease) ([facility]) 10/08/2024    Difficulty with speech 03/19/2024    History of amputation of hallux ([facility]) 03/18/2024    At risk for constipation 09/06/2023    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 mellitus, with necrosis of bone ([facility])     Acquired deformity of foot, right     Charcot's joint     Open wound of right foot 08/21/2023    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 mellitus, with bone involvement without evidence of necrosis ([facility])     Diabetic ulcer of left midfoot associated with type 2 diabetes mellitus, limited to breakdown of skin ([facility])     Diabetic polyneuropathy associated with type 2 diabetes mellitus ([facility])     Diabetes mellitus type 2 with peripheral artery disease ([facility])     History of amputation of lesser toe of left foot ([facility])     Onychomycosis     Traumatic retroperitoneal hematoma 08/19/2023    Osteoarthritis of left hip 07/27/2022    Severe Left Orchalgia 07/26/2022    Right shoulder pain 07/26/2022    Left hip pain 07/06/2022    Hemodialysis status ([facility])     Hypervolemia     Anemia due to chronic kidney disease, on chronic dialysis ([facility]) 01/21/2022    History of prediabetes     Hypertension     History of TIA (transient ischemic attack)     T10 vertebral fracture ([facility])        Physical Therapy:    Weight Bearing Status: Full Weight Bearing  Transfers: Supervision  Bed Mobility: Supervision  Amulation Distance (ft): 200 feet (to 350 feet)  Ambulation: Supervision (DS)  Assistive Device for Ambulation: Roller Walker  Number of Stairs: 8  Assistive Device for Stairs: Bilateral Hand Rails  Stair Assistance: Supervision  Discharge Recommendations: Home with:  DC Home with:: Family Support, First  Floor Setup, Home Physical Therapy    4/23/25: Ed continues to have impairments in LE strength, standing balance, and cardiovascular endurance. He is also limited by his back pain, fatigue from dialysis, dec vision (pt reports L eye blurry, R eye blurry and floater), B LE wounds and coccyx wound. He has barriers to home that include NAZARIO, dec caregiver support for IADLs. To address his current impairments and barriers, interventions have included LE TE, pain management (LE TE/flexibility, repositioning, STS for pressure relief), endurance training through increasing ambulation distances/Nustep, and to address current balance deficits pt is utilizing RW for ambulation/transfers. Additionally, repetitive task practice for transfers and stair negotiation has been done and pt has demonstrated improvement as he only requires (S), however he is still limited by his fatigue and with continued dialysis this may be a limitation. He has been able to manage his LSO Brace. Team is currently trying to identify possible solutions for pt to be able to get to/from dialysis so he can be d/c home. Will begin to practice more IADLs with pt to improve in home mobility and dec fall risk.Current PT POC is to coordinate with OT to progress pt to Prabhjot in Room with RW and to be (I) with LSO management.     Naresh presents to [facility] s/p a fall at home with an L2 fracture. Medically he has DM, diabetic neuropathy and decreased vision, and is on HD. He has increased back pain and use of LSO for L2 fracture. He has been living alone in an apartment. Biggest barriers at time of eval were increased back pain and limited ability to participate in therapy as a result. Additional barriers to home have been NAZARIO, dec caregiver availability and recent decline in vision that results in dec balance, dec ability to perform IADLs. Interventions have been therex, endurance training, use of RW, and functional task practice,. Since eval he has been able to  progress to CS for functional mobility with RW as of 4/16, however this is for basic transfers and walking. Due to having limited caregiver availability, he will need to manage RW up/down NAZARIO as well as all IADLs in the apartment (cooking, laundry, cleaning). As a team we are looking into resources for him to have transportation to HD and resources like meals on wheels to assist in dec burden of IADLs due to limited overall mobility and vision. PT POC will cont to focus on progressing BLE strength, activity tolerance, managing LSO brace, to progress stairs, walking distance, and (I) with functional mobility with use of RW.     Occupational Therapy:  Eating: Independent  Grooming: Supervision  Bathing: Minimal Assistance  Bathing: Minimal Assistance  Upper Body Dressing: Supervision  Lower Body Dressing: Minimal Assistance  Toileting: Supervision  Tub/Shower Transfer: Supervision  Toilet Transfer: Supervision  Cognition: Exceptions to WNL  Cognition: Decreased Comprehension  Orientation: Person, Place, Time, Situation  Discharge Recommendations: Home Independently  DC Home with:: Family Support, Home Occupational Therapy       04/16/25  Pt is demonstrating fair progress with occupational therapy and is progressing toward long term goals for ADL, IADL, and functional transfers/mobility. Pts long term goals for ADLs are Independent with Rolling Walker. Pt is currently Partial/moderate assistance  for ADLs. Pt continues to present with impairments in activity tolerance, endurance, standing balance/tolerance, UE strength, memory, insight, safety , judgement , and visual perceptual skills . Occupational performance remains limited by fatigue, pain, spinal precautions, impulsivity, (R) visual deficits , (L) visual deficits , decreased caregiver support, and risk for falls. Family training/education will be required prior to D/C. Pt will continue to benefit from skilled acute rehab OT services to address above mentioned  barriers and maximize functional independence in baseline areas of occupation to meet established treatment goals with overall decreased burden of care. Plan of care to continue to focus on ADL Retraining , LB Dressing, UB dressing,  LHAE education/training, IADL training , Kitchen Mobilty, Meal preparation, Medication management , Functional Transfers, Functional Cognition, Functional Attention, Standing tolerance, Standing balance , Gross motor strengthening , Visual perceptual skills, Visual scanning, Low vision education/training, Family training/education, Energy conservation training/education, healthy coping education, Leisure and social pursuits, and community re-integration.     Anticipate Discharge date to be set.     04/23/25: Pt is demonstrating good progress with occupational therapy and is progressing toward long term goals for ADL, IADL, and functional transfers/mobility. Pts long term goals for ADLs are Independent with Rolling Walker. Pt is currently Supervision or touching assistance-  for ADLs. Pt continues to present with impairments in activity tolerance, endurance, visual perceptual skills , and depth perception . Occupational performance remains limited by fatigue, pain, spinal precautions, decreased caregiver support, risk for falls, and home environment. Family training/education will not be required prior to D/C. Pt will continue to benefit from skilled acute rehab OT services to address above mentioned barriers and maximize functional independence in baseline areas of occupation to meet established treatment goals with overall decreased burden of care. Plan of care to continue to focus on ADL Retraining ,  LHAE education/training, IADL training , Kitchen Mobilty, Meal preparation, Medication management , Functional Transfers, Standing tolerance, Standing balance , Low vision education/training, DME training/education, Energy conservation training/education, healthy coping education,  Leisure and social pursuits, and community re-integration. Goals for the upcoming week are: completing IADLs and preparing for DC.     Anticipate DC this wk pending transport setup w/ dialysis.       Speech Therapy:           No notes on file    Nursing Notes:     Diet Type: Regular/House                      Diet Patient/Family Education Complete: No                         Type of Wound Patient/Family Education: No  Bladder: Continent     Bladder Patient/Family Education: No  Bowel: Continent     Bowel Patient/Family Education: No  Pain Location/Orientation: Orientation: Lower, Location: Back  Pain Score: 4                       Hospital Pain Intervention(s): Emotional support, Rest  Pain Patient/Family Education: No  Medication Management/Safety  Safe Administration: No  Reason for non-safe administration: non-compliant    65 y/o male with a medical history of but not limited to chronic anemia; DM; ESRD on HD; hyperlipidemia ; HTN ; hyperkalemia ; left toe amputation ; TIA  who initially presented to [facility] on 4/[facility] after falling a few days prior onto a rail but had progressive pain to lower mid back. Of note he was recently admitted to the hospital after a fall where he was diagnosed with an L2 fracture. He reported multiple recent falls with no dizziness, chest pain, lightheadedness. His falls were described as mechanical. He was found to have a large retroperitoneal hematoma on the right and the fore-mentioned L2 fracture. CTA of the abdomen and pelvis showed extravasation of contrast. He was transferred to [facility] on 4/3/25 for further eval / assessment / consultation. During hospital course / stay he went to IR for embolization of his deep circumflex iliac artery and superior gluteal artery. Nephrology was consulted for hemodialysis management. His hemoglobin trended down and he was transfused with a total of 4 PBRCs during hospital course.  He underwent CTA of the  abdomen and pelvis which showed a 5 mm pseudoaneurysm of the inferior gluteal artery branch.  He underwent embolization of the pseudoaneurysm.  He was started on subcu heparin for DVT prophylaxis and his hemoglobin closely monitored with stable hemoglobin send DVT prophylaxis he was transition to his home Eliquis dosing.  His hemoglobin remained stable on Eliquis. He is WBAT to all extremities. Pain has been managed with PO and IV PRN analgesics. PT/OT therapies were consulted and continue to follow patient at this time and are recommending inpatient acute rehab when medically stable. All involved medical disciplines feel/agree patient is medically ready for discharge at this time. Inpatient acute rehabilitation physician was consulted. Upon review of patient's case and correspondence with PT/OT therapy services, [facility] physician feels Naresh will benefit and is a good candidate / appropriate for inpatient acute rehab at this time. He has demonstrated the willingness / desire and tolerance to participate in the required 3 hours or more of therapies per day.     This week we will encourage independence with ADLs.  We will monitor labs and vital signs.  We will educate pt/family about repositioning to prevent skin breakdown.  We will assist w repositioning and perform routine skin checks.  We will monitor for adequate pain control.  We will monitor for constipation and medicate pt as ordered.  We will increase safety awareness and keep pt free from falls.    4/24:   Pt is alarmed for safety. Sleeps in recliner. DW. Sleep log. Severely visually impaired. Pt keeps room dark d/t lights being painful/irritating. L2 vertrebral body fracture and retroperitoneal hematoma. LSO brace - can be non-compliant at times when sitting in chair. Lumbar precautions. Pain is being managed by scheduled tylenol, gabapentin. Robaxin and PRN lidocaine patches and oxycodone. Pt does look somnolent. HD T, Th, Sat. R permacath.  Continent of  bowel and bladder. Mostly anuric with occasional voids. Last bowel movement was 4/13. 1200 mL fluid restriction for hyponatremia. Min Ax1 with RW.     Case Management:     Discharge Planning  Living Arrangements: Lives Alone  Support Systems: Family members  Assistance Needed: may need at D/C  Type of Current Residence: Private residence  Current Home Care Services: No  Pt is participating with therapy and is going to achieve goals to return home. Pt is in agreement to switch community dialysis locations in order to receive transportation assistance when staying at his sister's address. [facility] recommendations for contd nursing and therapy services will be arranged.     Is the patient actively participating in therapies? yes  List any modifications to the treatment plan:     Barriers Interventions   stairs Therapy stair training with walker   Lives alone I adl retraining   vision Scanning environment, compensatory strategies   lso Continued training for independence at dc         Is the patient making expected progress toward goals? yes  List any update or changes to goals:     Medical Goals: Patient will be medically stable for discharge to [facility] upon completion of rehab program and Patient will be able to manage medical conditions and comorbid conditions with medications and follow up upon completion of rehab program    Weekly Team Goals:   Rehab Team Goals  ADL Team Goal: Patient will be independent with ADLs with least restrictive device upon completion of rehab program  Transfer Team Goal: Patient will be independent with transfers with least restrictive device upon completion of rehab program  Locomotion Team Goal: Patient will be independent with locomotion with least restrictive device upon completion of rehab program    Discussion: pt continues with the above barriers and setting up dialysis in a new facility is pts biggest barrier to dc to home. Pt is benefiting from continued balance  training and stair negotiation. Pts vision is not going to be resolved during this stay and pt had been educated on folllow up as an outpt. Pt will be able to achieve goals of independent/mod I on dc. Pt will benefit from continued hhc for rn pt and ot services on dc.     Anticipated Discharge Date:  tbd  [facility] Team Members Present:The following team members are supervising care for this patient and were present during this Weekly Team Conference.    Physician: Dr. Freida DO  : JACKI Hooker  Registered Nurse: Summer Rojas RN  Physical Therapist: Niharika Magallanes DPT  Occupational Therapist: Simone Esparza MS, OTR/L  Speech Therapist:

## 2025-04-24 NOTE — ASSESSMENT & PLAN NOTE
> 4/15: Phos 4.6  - Management per nephrology  - Continue phosphate binders   ERM does NOT APPEAR VISUALLY SIGNIFICANT.

## 2025-04-24 NOTE — PROGRESS NOTES
"OT daily tx note     04/24/25 0700   Pain Assessment   Pain Assessment Tool 0-10   Pain Score 9   Pain Location/Orientation Orientation: Lower;Location: Back   Hospital Pain Intervention(s) Emotional support;Rest   Restrictions/Precautions   Precautions Fall Risk;Fluid restriction;Pain;Spinal precautions;Supervision on toilet/commode;Visual deficit   ROM Restrictions Yes  (spinal prec)   Braces or Orthoses LSO   Lifestyle   Autonomy \"I can do everything you ask of me\"   Eating   Type of Assistance Needed Independent   Physical Assistance Level No physical assistance   Comment seated in recliner   Eating CARE Score 6   Oral Hygiene   Comment refused to complete until after breakfast   Shower/Bathe Self   Comment refused to SB during session despite some prompting that he hasn't been washed in a few days; pt grows frustrated if pushed too hard so not able to complete w/ multiple attempts   Upper Body Dressing   Type of Assistance Needed Independent   Physical Assistance Level No physical assistance   Comment agreeable to change shirt and able to complete ind as well as LSO mgmt   Upper Body Dressing CARE Score 6   Putting On/Taking Off Footwear   Comment did not wish to use sock aid to change socks as he said previously he was able to utilize bed for propping legs up to don/doff; refused to complete during session. BLE ace wraps left on overnight and taken off during session due to severe indentation, nsg notified   Sit to Stand   Type of Assistance Needed Set-up / clean-up   Physical Assistance Level No physical assistance   Comment w/ RW   Sit to Stand CARE Score 5   Bed-Chair Transfer   Type of Assistance Needed Set-up / clean-up   Physical Assistance Level No physical assistance   Comment w/ RW   Chair/Bed-to-Chair Transfer CARE Score 5   Toileting Hygiene   Comment (S)  did not need to use BR but will need to assess next session how pt completes to allow for mod I in room   Money Management   Money Management pt " reports that ex-wife assists w/ finances PTA and will cont to A as needed   Commmunity Re-entry   Community Re-entry pt participated in community distance walking t/o unit to simualte household distances and improve overall endurance. Pt using RW and G dynamic walking balance w/ seated rest breaks as needed. pt cont to beneift from activity to address endurance, weakness, and balance   Health Management   Health Management Pt inquiring if [facility] can provide blister packs for pt to utilize at home. OT explained that [facility] called and they do not provide specialized packs. OT explained that pt may need to utilize a pill box that  can sort for the week as he has great difficulty reading prescription labels despite enlarged print and magnify glass. pt seemed agreeable to this solution and will call sisterbarbara this PM   Cognition   Overall Cognitive Status Impaired   Arousal/Participation Cooperative;Responsive   Attention Attends with cues to redirect   Orientation Level Oriented X4   Memory Decreased recall of precautions   Following Commands Follows one step commands without difficulty   Vision   Vision Comments pt requested to trial patching R eye. initial 5 min, some sucess using L eye but once adjusted, pt reporting no sig changes. left in room if pt wished to trial again   Activity Tolerance   Activity Tolerance Patient limited by pain;Patient limited by fatigue   Assessment   Treatment Assessment Pt engaged in skilled OT session with focus on IADL training , Medication management , Functional Transfers, Functional Cognition, Standing tolerance, Standing balance , Visual perceptual skills, Low vision education/training, Energy conservation training/education, healthy coping education, Leisure and social pursuits, and community re-integration. Pt is limited by weakness, impaired balance, decreased endurance, increased fall risk, decreased ADLS, decreased IADLS, pain, decreased  activity tolerance, decreased safety awareness, and impaired judgement. Pt was scheduled for morning ADL but refused to complete. Instead discussion about IADL and DC planning. Pt at first very frustrated but eventually more agreeable to discuss. Pt reports sister can assist w/ grocery shopping and med mgmt and ex-wife assist w/ finances. OT rec to use microwaveable meals until eye appt and pt agreeable. Sister can provide some leftovers to pt from pt reports. Eye patching initiated in session w/o G success. Cont practice w/ [employer] today and able to call contacts. Pt appreciative and would like to cont to learn/use for carryover to home. OT services are warranted to address above barriers.   Prognosis Good   Problem List Decreased strength;Decreased endurance;Impaired balance;Decreased mobility;Orthopedic restrictions;Pain;Impaired vision   Barriers to Discharge Inaccessible home environment;Decreased caregiver support   Plan   Treatment/Interventions ADL retraining;Functional transfer training;Therapeutic exercise;Endurance training   Progress Progressing toward goals   OT Therapy Minutes   OT Time In 0700   OT Time Out 0830   OT Total Time (minutes) 90   OT Mode of treatment - Individual (minutes) 90   OT Mode of treatment - Concurrent (minutes) 0   OT Mode of treatment - Group (minutes) 0   OT Mode of treatment - Co-treat (minutes) 0   OT Mode of Treatment - Total time(minutes) 90 minutes   OT Cumulative Minutes 890   Therapy Time missed   Time missed? No

## 2025-04-24 NOTE — ASSESSMENT & PLAN NOTE
Continue local care  Being followed by WC  Last LEADS was 3/28/25 = 50-75% right prox polital and prox tibioperoneal trunk; >75% stenosis in prox popliteal artery on left  Was seen by VS in 12/2024 hospital stay and he declined any intervention at the time  Podiatry saw here on 4/22/25, s/p debridement = no surgical intervention needed at this time  Vascular surgery to see per Podiatry's recommendation

## 2025-04-24 NOTE — ASSESSMENT & PLAN NOTE
Lab Results   Component Value Date    EGFR 7 04/22/2025    EGFR 8 04/19/2025    EGFR 7 04/17/2025    CREATININE 7.18 (H) 04/22/2025    CREATININE 6.06 (H) 04/19/2025    CREATININE 6.83 (H) 04/17/2025

## 2025-04-24 NOTE — ASSESSMENT & PLAN NOTE
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 Pt found to have retroperitoneal hematoma s/p resolution with IR embolization of R deep circumflex iliac artery and superior gluteal artery on 4/3 c/b gluteal artery branch PSA with IR pelvic a-gram with embolization of L inferior gluteal artery. Vascular surgery consulted for concerns of non-healing wounds to bilateral feet x 2 weeks after crawling on floor across glass shards after his fall.     ABIs 4/23:   Right - SALVATORE U /  / 2nd toe (GT amp) 101  Left - SALVATORE U /  / GT 72    LEADs 3/28:  Right - 50-75% stenosis of prox pop artery and prox TP trunk. Diffuse disease  Left - > 75% stenosis of prox pop art. Patient refused ABIs.    L foot XR - neg for OM    Pt declined femoral pulse exam, femorals accessible for recent IR embolization procedure. Reviewed by Dr. Potetr on CT imaging.  Bilateral AT/PT doppler signals      Recommendations:  --Appreciate final recommendations from attending. Due to chronic appearing wounds, which pre-date this admission and worse on LLE, recommend elective LLE a-gram to evaluate his pop stenosis.   --No acute intervention indicated as inpatient, wounds are stable, no L foot OM on xray  --Podiatry examined, signed off of patient. Recommend local wound care and offloading heels  --Continue to wrap BLE in Ace for edema control  --Continue Eliquis/lipitor  --Recommend starting ASA  --VTE proph  --Encourage ambulation with PT/OT  --Nephro for dialysis/ESRD management  --[facility] for rehab, discussing d/c planning, date pending dialysis set up  --Follow up appointment with Vascular office made  --Rest of care per primary team/[facility]

## 2025-04-24 NOTE — PLAN OF CARE
Problem: PAIN - ADULT  Goal: Verbalizes/displays adequate comfort level or baseline comfort level  Description: Interventions:- Encourage patient to monitor pain and request assistance- Assess pain using appropriate pain scale- Administer analgesics based on type and severity of pain and evaluate response- Implement non-pharmacological measures as appropriate and evaluate response- Consider cultural and social influences on pain and pain management- Notify physician/advanced practitioner if interventions unsuccessful or patient reports new pain  Outcome: Progressing     Problem: INFECTION - ADULT  Goal: Absence or prevention of progression during hospitalization  Description: INTERVENTIONS:- Assess and monitor for signs and symptoms of infection- Monitor lab/diagnostic results- Monitor all insertion sites, i.e. indwelling lines, tubes, and drains- Monitor endotracheal if appropriate and nasal secretions for changes in amount and color- [place] appropriate cooling/warming therapies per order- Administer medications as ordered- Instruct and encourage patient and family to use good hand hygiene technique- Identify and instruct in appropriate isolation precautions for identified infection/condition  Outcome: Progressing  Goal: Absence of fever/infection during neutropenic period  Description: INTERVENTIONS:- Monitor WBC  Outcome: Progressing     Problem: SAFETY ADULT  Goal: Patient will remain free of falls  Description: INTERVENTIONS:- Educate patient/family on patient safety including physical limitations- Instruct patient to call for assistance with activity - Consult OT/PT to assist with strengthening/mobility - Keep Call bell within reach- Keep bed low and locked with side rails adjusted as appropriate- Keep care items and personal belongings within reach- Initiate and maintain comfort rounds- Make Fall Risk Sign visible to staff- Offer Toileting every 2 Hours, in advance of need- Initiate/Maintain alarm-  Obtain necessary fall risk management equipment: - Apply yellow socks and bracelet for high fall risk patients- Consider moving patient to room near nurses station  Outcome: Progressing  Goal: Maintain or return to baseline ADL function  Description: INTERVENTIONS:-  Assess patient's ability to carry out ADLs; assess patient's baseline for ADL function and identify physical deficits which impact ability to perform ADLs (bathing, care of mouth/teeth, toileting, grooming, dressing, etc.)- Assess/evaluate cause of self-care deficits - Assess range of motion- Assess patient's mobility; develop plan if impaired- Assess patient's need for assistive devices and provide as appropriate- Encourage maximum independence but intervene and supervise when necessary- Involve family in performance of ADLs- Assess for home care needs following discharge - Consider OT consult to assist with ADL evaluation and planning for discharge- Provide patient education as appropriate  Outcome: Progressing  Goal: Maintains/Returns to pre admission functional level  Description: INTERVENTIONS:- Perform AM-PAC 6 Click Basic Mobility/ Daily Activity assessment daily.- Set and communicate daily mobility goal to care team and patient/family/caregiver. - Collaborate with rehabilitation services on mobility goals if consulted- Perform Range of Motion 3 times a day.- Reposition patient every 2 hours.- Dangle patient 3 times a day- Stand patient 3 times a day- Ambulate patient 3 times a day- Out of bed to chair 3 times a day - Out of bed for meals 3 times a day- Out of bed for toileting- Record patient progress and toleration of activity level   Outcome: Progressing     Problem: DISCHARGE PLANNING  Goal: Discharge to home or other facility with appropriate resources  Description: INTERVENTIONS:- Identify barriers to discharge w/patient and caregiver- Arrange for needed discharge resources and transportation as appropriate- Identify discharge learning  needs (meds, wound care, etc.)- Arrange for interpretive services to assist at discharge as needed- Refer to Case Management Department for coordinating discharge planning if the patient needs post-hospital services based on physician/advanced practitioner order or complex needs related to functional status, cognitive ability, or social support system  Outcome: Progressing     Problem: Prexisting or High Potential for Compromised Skin Integrity  Goal: Skin integrity is maintained or improved  Description: INTERVENTIONS:- Identify patients at risk for skin breakdown- Assess and monitor skin integrity- Assess and monitor nutrition and hydration status- Monitor labs - Assess for incontinence - Turn and reposition patient- Assist with mobility/ambulation- Relieve pressure over bony prominences- Avoid friction and shearing- Provide appropriate hygiene as needed including keeping skin clean and dry- Evaluate need for skin moisturizer/barrier cream- Collaborate with interdisciplinary team - Patient/family teaching- Consider wound care consult   Outcome: Progressing     Problem: METABOLIC, FLUID AND ELECTROLYTES - ADULT  Goal: Electrolytes maintained within normal limits  Description: INTERVENTIONS:- Monitor labs and assess patient for signs and symptoms of electrolyte imbalances- Administer electrolyte replacement as ordered- Monitor response to electrolyte replacements, including repeat lab results as appropriate- Instruct patient on fluid and nutrition as appropriate  Outcome: Progressing  Goal: Fluid balance maintained  Description: INTERVENTIONS:- Monitor labs - Monitor I/O and WT- Instruct patient on fluid and nutrition as appropriate- Assess for signs & symptoms of volume excess or deficit  Outcome: Progressing     Problem: Nutrition/Hydration-ADULT  Goal: Nutrient/Hydration intake appropriate for improving, restoring or maintaining nutritional needs  Description: Monitor and assess patient's nutrition/hydration  status for malnutrition. Collaborate with interdisciplinary team and initiate plan and interventions as ordered.  Monitor patient's weight and dietary intake as ordered or per policy. Utilize nutrition screening tool and intervene as necessary. Determine patient's food preferences and provide high-protein, high-caloric foods as appropriate. INTERVENTIONS:- Monitor oral intake, urinary output, labs, and treatment plans- Assess nutrition and hydration status and recommend course of action- Evaluate amount of meals eaten- Assist patient with eating if necessary - Allow adequate time for meals- Recommend/ encourage appropriate diets, oral nutritional supplements, and vitamin/mineral supplements- Order, calculate, and assess calorie counts as needed- Recommend, monitor, and adjust tube feedings and TPN/PPN based on assessed needs- Assess need for intravenous fluids- Provide specific nutrition/hydration education as appropriate- Include patient/family/caregiver in decisions related to nutrition  Outcome: Progressing     Problem: Nutrition/Hydration-ADULT  Goal: Nutrient/Hydration intake appropriate for improving, restoring or maintaining nutritional needs  Description: Monitor and assess patient's nutrition/hydration status for malnutrition. Collaborate with interdisciplinary team and initiate plan and interventions as ordered.  Monitor patient's weight and dietary intake as ordered or per policy. Utilize nutrition screening tool and intervene as necessary. Determine patient's food preferences and provide high-protein, high-caloric foods as appropriate. INTERVENTIONS:- Monitor oral intake, urinary output, labs, and treatment plans- Assess nutrition and hydration status and recommend course of action- Evaluate amount of meals eaten- Assist patient with eating if necessary - Allow adequate time for meals- Recommend/ encourage appropriate diets, oral nutritional supplements, and vitamin/mineral supplements- Order, calculate,  and assess calorie counts as needed- Recommend, monitor, and adjust tube feedings and TPN/PPN based on assessed needs- Assess need for intravenous fluids- Provide specific nutrition/hydration education as appropriate- Include patient/family/caregiver in decisions related to nutrition  Outcome: Progressing

## 2025-04-24 NOTE — PROGRESS NOTES
"   04/24/25 1000   Pain Assessment   Pain Score 4   Pain Location/Orientation Orientation: Lower;Location: Back   Hospital Pain Intervention(s) Rest;Repositioned   Restrictions/Precautions   Precautions Fall Risk;Fluid restriction;Pain;Spinal precautions;Supervision on toilet/commode;Visual deficit   RLE Weight Bearing Per Order WBAT   LLE Weight Bearing Per Order WBAT   ROM Restrictions Yes  (spinal precautions)   Braces or Orthoses LSO  (B LE ace wrap which were off at start of session for LE to breathe as it was reported that it was not taken off overnight. Donned again towards the end of session)   Cognition   Overall Cognitive Status Impaired   Arousal/Participation Cooperative;Responsive  (sleepy/ tired)   Attention Attends with cues to redirect   Orientation Level Oriented X4   Memory Decreased recall of precautions   Following Commands Follows one step commands without difficulty   Subjective   Subjective \"I don't get a chance to  rest in this place\"   Sit to Stand   Type of Assistance Needed Independent;Adaptive equipment   Comment with RW   Sit to Stand CARE Score 6   Transfer Bed/Chair/Wheelchair   Adaptive Equipment Roller Walker   Car Transfer   Reason if not Attempted Refused to perform   Car Transfer CARE Score 7   Walk 10 Feet   Type of Assistance Needed Set-up / clean-up   Comment with RW   Walk 10 Feet CARE Score 5   Walk 50 Feet with Two Turns   Type of Assistance Needed Set-up / clean-up;Adaptive equipment   Comment with RW   Walk 50 Feet with Two Turns CARE Score 5   Walk 150 Feet   Type of Assistance Needed Adaptive equipment;Supervision   Comment with RW   Walk 150 Feet CARE Score 4   Walking 10 Feet on Uneven Surfaces   Type of Assistance Needed Supervision;Adaptive equipment   Comment S with RW   Walking 10 Feet on Uneven Surfaces CARE Score 4   Ambulation   Primary Mode of Locomotion Prior to Admission Walk   Distance Walked (feet) 350 ft  (300, 150, shorter distances for task)   Assist " "Device Roller Walker   Gait Pattern Inconsistant Lisa;Decreased foot clearance;Forward Flexion;Improper weight shift;Wide GARO   Limitations Noted In Endurance   Walk Assist Level Distant Supervision   Does the patient walk? 2. Yes   Wheel 50 Feet with Two Turns   Reason if not Attempted Activity not applicable   Wheel 50 Feet with Two Turns CARE Score 9   Wheel 150 Feet   Reason if not Attempted Activity not applicable   Wheel 150 Feet CARE Score 9   Wheelchair mobility   Does the patient use a wheelchair? 0. No   Curb or Single Stair   Style negotiated Curb   Type of Assistance Needed Adaptive equipment;Incidental touching;Supervision   Comment CS /CGA with RW on 6\"curb step   1 Step (Curb) CARE Score 4   4 Steps   Type of Assistance Needed Supervision   Comment B HR , non reciprocal pattern S   4 Steps CARE Score 4   12 Steps   Reason if not Attempted Activity not applicable   12 Steps CARE Score 9   Stairs   Type Stairs;Curb   # of Steps 8   Weight Bearing Precautions Fall Risk   Assist Devices Bilateral Rail   Toilet Transfer   Comment used urinal at start of session   Therapeutic Interventions   Strengthening seated LAQ and hip flex with 2.5# wts, add squeeze with ball, hip abd and hamstring curl with blue TB, standing hip flexion and hip abd using RW   Flexibility B hamstring and gastroc stretching , lubricated B lower leg  with lotion   Other Comments   Comments Podiatry present at start of session to redress feet sore   Assessment   Treatment Assessment Pt. engaged in 90 mins session and was able to tolerate above activities. Pt. very fatigue from lack of sleep last night and fell asleep on and off during seated thera ex. Pt. cont to demonstrate good progress in PT and functions at DS level using RW. Cont PT POC as tolerated.   Problem List Decreased strength;Decreased endurance;Impaired balance;Decreased mobility;Orthopedic restrictions;Pain;Impaired vision   Barriers to Discharge Inaccessible home " environment;Decreased caregiver support   PT Barriers   Functional Limitation Car transfers;Stair negotiation;Standing;Transfers;Walking   Plan   Treatment/Interventions Functional transfer training;LE strengthening/ROM;Elevations;Therapeutic exercise;Endurance training;Patient/family training;Equipment eval/education;Bed mobility;Gait training   Discharge Recommendation   Rehab Resource Intensity Level, PT   (Home vs out patient)   Equipment Recommended Walker   PT Therapy Minutes   PT Time In 1000   PT Time Out 1130   PT Total Time (minutes) 90   PT Mode of treatment - Individual (minutes) 90   PT Mode of treatment - Concurrent (minutes) 0   PT Mode of treatment - Group (minutes) 0   PT Mode of treatment - Co-treat (minutes) 0   PT Mode of Treatment - Total time(minutes) 90 minutes   PT Cumulative Minutes 930   Therapy Time missed   Time missed? No

## 2025-04-24 NOTE — ASSESSMENT & PLAN NOTE
- Has chronic wounds affecting the left foot plantar surface, left foot 1st and 2nd digits  - Left Plantar Foot Wounds and Left toes:  Cleanse with NSS and pat dry. Paint all wounds with Betadine daily and cover plantar open wound with silicone bordered foam dressing. Naren with T for Treatment and change every other day or PRN   > 4/22 : Wound care recs: Paint left foot 1st and 2nd digits with betadine dailyand covering plantar open wound with foam. Podiatry consulted   > 4/22: Podiatry did bedside debridement of left foot plantar ulcer. Continue with local wound care, no surgical intervention needed. SALVATORE ordered and vascular surgery consulted. Pt to maintain frequent LLE elevation, strict pressure and fracture reduction. WBAT to LLE  > 4/24: Pictures of LLE reviewed on chart. Wounds are stable with no signs of infection. XR L foot 4/22 with no evidence of OM. ABIs and toe pressures are within healing range. Pt to continue to follow up with wound care for further management

## 2025-04-24 NOTE — ASSESSMENT & PLAN NOTE
"- Reporting \"floater\" within the right eye with acute onset on 4/11/25 at mid-day while watching TV; describes a dark, \"spider\"-shaped floater that occupies a significant portion of the visual field, painless; has similar floaters in the left eye but smaller  - Ophthalmology consulted on [facility] admission by IM, per prior trauma surgery documentation, Dr. Chaudhry of ophthalmology was made personally aware of clinical situation on 4/11/25 prior to [facility] admission  > Ophthalmology consult 4/12: Exam c/w proliferative diabetic retinopathy with vitreous hemorrhage. Pt will follow up with ophtho immediately after discharge and for referral to a retina specialist  > 4/15- Pt thinks his R eye floater may be larger today, unable to clearly state or quantify symptoms. No other visual complaints of increased number of floaters, double/blurry vision, or bright flashes. Advised patient to inform us if symptoms change - will reach out to Ophthalmology accordingly  > 4/18- No changes noted in vision/floaters. His overall impaired vision continues to be a barrier for his ADLs/iADLs  > 4/20-24- Stable R eye floater    - Monitor for changes in vision  "

## 2025-04-24 NOTE — ASSESSMENT & PLAN NOTE
- Currently rate controlled  - Embolic stroke risk reduction with apixaban 5 mg BID    >4/22- Refusing it this morning. Extensive conversation had with patient to discuss the importance of compliance given his high stroke risk.   > 4/24- now compliant with Eliquis

## 2025-04-24 NOTE — CONSULTS
Consultation - Vascular Surgery   Name: Naresh Carpio 64 y.o. male I MRN: [id number]  Unit/Bed#: -01 I Date of Admission: 4/11/2025   Date of Service: 4/24/2025 I Hospital Day: 13   Inpatient consult to Vascular Surgery  Consult performed by: Sunil Chow PA-C  Consult ordered by: Michelle Landa DO        Physician Requesting Evaluation: Lavinia Sawyer MD   Reason for Evaluation / Principal Problem: Bilateral non-healing foot wounds, PAD    Assessment & Plan  PAD (peripheral artery disease) (HCC)  64 year old male with a history of HTN, IDDM, HLD, ESRD with HD via permacath, A-fib on Eliquis, TIA, PSA, CHF, anemia and hx of R first toe OM s/p GT amputation and PAD, originally presented to [facility] due to a mechanical fall at home. Pt found to have retroperitoneal hematoma s/p resolution with IR embolization of R deep circumflex iliac artery and superior gluteal artery on 4/3 c/b gluteal artery branch PSA with IR pelvic a-gram with embolization of L inferior gluteal artery. Vascular surgery consulted for concerns of non-healing wounds to bilateral feet x 2 weeks after crawling on floor across glass shards after his fall.     ABIs 4/23:   Right - SALVATORE U /  / 2nd toe (GT amp) 101  Left - SALVATORE U /  / GT 72    LEADs 3/28:  Right - 50-75% stenosis of prox pop artery and prox TP trunk. Diffuse disease  Left - > 75% stenosis of prox pop art. Patient refused ABIs.    L foot XR - neg for OM    Pt declined femoral pulse exam, femorals accessible for recent IR embolization procedure. Reviewed by Dr. Potter on CT imaging.  Bilateral AT/PT doppler signals      Recommendations:  --Appreciate final recommendations from attending. Due to chronic appearing wounds, which pre-date this admission and worse on LLE, recommend elective LLE a-gram to evaluate his pop stenosis.   --No acute intervention indicated as inpatient, wounds are stable, no L foot OM on xray  --Podiatry examined, signed off of patient. Recommend  local wound care and offloading heels  --Continue to wrap BLE in Ace for edema control  --Continue Eliquis/lipitor  --Recommend starting ASA  --VTE proph  --Encourage ambulation with PT/OT  --Nephro for dialysis/ESRD management  --ARC for rehab, discussing d/c planning, date pending dialysis set up  --Follow up appointment with Vascular office made  --Rest of care per primary team/ARC    Traumatic retroperitoneal hematoma  S/p fall with traumatic RP hematoma with IR arterial embolization  Hgb stable  Diabetic ulcer of left midfoot associated with type 2 diabetes mellitus, limited to breakdown of skin ([facility])  Lab Results   Component Value Date    HGBA1C 5.7 (H) 02/09/2025       Multiple wounds to bilateral feet, in various stages of healing. Hx of R GT amp from OM  Non-tender when non-ambulatory  Podiatry evaluated, LWC and signed off  LEADs results within healing range    Images from media from 4/24:  Wounds to L GT and hallux appear in various stages of healing, R pretibial wound noted.             Anemia due to chronic kidney disease, on chronic dialysis ([facility])  Lab Results   Component Value Date    EGFR 7 04/22/2025    EGFR 8 04/19/2025    EGFR 7 04/17/2025    CREATININE 7.18 (H) 04/22/2025    CREATININE 6.06 (H) 04/19/2025    CREATININE 6.83 (H) 04/17/2025     At risk for constipation    Hyponatremia    ESRD (end stage renal disease) ([facility])  Lab Results   Component Value Date    EGFR 7 04/22/2025    EGFR 8 04/19/2025    EGFR 7 04/17/2025    CREATININE 7.18 (H) 04/22/2025    CREATININE 6.06 (H) 04/19/2025    CREATININE 6.83 (H) 04/17/2025     Paroxysmal atrial fibrillation ([facility])    Chronic diastolic (congestive) heart failure ([facility])  Wt Readings from Last 3 Encounters:   04/24/25 94.3 kg (208 lb)   04/11/25 98.5 kg (217 lb 2.5 oz)   03/24/25 107 kg (234 lb 12.6 oz)     Primary hypertension    Closed fracture of proximal end of left humerus with routine healing    Gout    L2 vertebral fracture ([facility])    Secondary  "hyperparathyroidism (HCC)    Acute pain due to trauma    Visual disturbance    Hyperphosphatemia    Wound of skin    At high risk for skin breakdown    At risk for venous thromboembolism (VTE)    Impaired mobility and activities of daily living    Insomnia    Please contact the SecureChat role,\"BE Vascular Surgery Resident/AP role\", with any questions/concerns.    History of Present Illness   Naresh Carpio is a 64 y.o. male with a past medical history of HTN, IDDM, HLD, ESRD with HD via permacath, A-fib on Eliquis, TIA, PSA, CHF, anemia and hx of R first toe OM s/p GT amputation and PAD. He presented to the [facility] on 4/3 secondary to mechanical fall in his kitchen.  He reports dropping his glass of tea which shattered and he proceeded to crawl across the floor injuring his feet with glass shards when he called 911.  Patient transferred from [facility] to [facility] in the setting of traumatic left retroperitoneal hematoma s/p IR embolization of R deep circumflex iliac artery and superior gluteal artery on 4/3 c/b gluteal artery branch PSA with IR pelvic a-gram with embolization of L inferior gluteal artery on 4/6.     Vascular surgery consulted to evaluate non-healing wounds of bilateral feet and hx of PAD.  Podiatry seen and evaluated, and determined no inpatient intervention required with local wound care and offloading of bilateral heels. LEADs demonstrated non-compressible ABIs, but within healing MT and GT pressures bilaterally. Pt declined finishing vascular exam, but had bilateral AT/PT doppler signals. He denies smoking, and ambulates with Rolator walker, denies rest pain or claudication, but does report a history of non-healing wounds mostly to his LLE. Hx of LLE a-gram due to OM of R foot and non-healing ulcer in 2023, with no intervention as there was adequate in-line flow. Exam limited due to patient being in dialysis in recliner chair, in which he declined further     Review of Systems "   Constitutional:  Negative for activity change and fatigue.   HENT: Negative.     Respiratory: Negative.     Cardiovascular: Negative.    Gastrointestinal: Negative.    Genitourinary: Negative.    Musculoskeletal:  Positive for back pain.   Skin:  Positive for wound (Bilateral feet).   Neurological:  Positive for dizziness. Negative for numbness.     Medical History Review: I have reviewed the patient's PMH, PSH, Social History, Family History, Meds, and Allergies     Objective :  Temp:  [97.8 °F (36.6 °C)-98.1 °F (36.7 °C)] 98 °F (36.7 °C)  HR:  [60-64] 62  BP: (124-146)/(55-66) 124/66  Resp:  [17-18] 18  SpO2:  [97 %-99 %] 97 %  O2 Device: None (Room air)    I/O         04/22 0701  04/23 0700 04/23 0701  04/24 0700 04/24 0701  04/25 0700    P.O.  220 120    I.V. (mL/kg) 500 (5.2)      Total Intake(mL/kg) 500 (5.2) 220 (2.3) 120 (1.3)    Urine (mL/kg/hr)  100 (0)     Other 2500      Stool 0      Total Output 2500 100     Net -2000 +120 +120           Unmeasured Stool Occurrence 1 x            Lines/Drains/Airways       Active Status       Name Placement date Placement time Site Days    HD Permanent Double Catheter 08/30/23  --  Internal jugular  603                  Physical Exam  HENT:      Head: Normocephalic and atraumatic.      Mouth/Throat:      Mouth: Mucous membranes are dry.   Cardiovascular:      Rate and Rhythm: Normal rate.      Pulses:           Radial pulses are 1+ on the right side and 1+ on the left side.   Pulmonary:      Effort: Pulmonary effort is normal. No respiratory distress.   Abdominal:      Palpations: Abdomen is soft.   Musculoskeletal:      Right lower leg: Edema present.      Left lower leg: Edema present.      Comments: R GT amp   Skin:     General: Skin is warm and dry.      Capillary Refill: Capillary refill takes 2 to 3 seconds.      Coloration: Skin is pale.      Findings: Erythema (R foot) present.      Comments: Chronic toes to distal digits and pretibial L shin   Neurological:       Mental Status: He is alert and oriented to person, place, and time.      Sensory: No sensory deficit.      Motor: No weakness.      Gait: Gait abnormal.            Lab Results: I have reviewed the following results:  Recent Labs     04/22/25  1237   WBC 7.27   HGB 8.4*   HCT 26.4*      SODIUM 129*   K 4.3   CL 89*   CO2 29   BUN 48*   CREATININE 7.18*   GLUC 98       Imaging Results Review: I reviewed radiology reports from this admission including: Ultrasound(s).  Other Study Results Review: No additional pertinent studies reviewed.    VTE Prophylaxis: VTE covered by:  apixaban, Oral, 5 mg at 04/24/25 0841       Sunil Chow PA-C  04/24/2025

## 2025-04-24 NOTE — ASSESSMENT & PLAN NOTE
- Evaluated by APS on acute care  - Tylenol 975 mg q8h scheduled  - Gabapentin 100 mg three times weekly  - Methocarbamol 1000 mg q8h scheduled  - Lidocaine patches daily PRN  - Oxycodone 5 or 10 mg q4h PRN, wean as possible

## 2025-04-24 NOTE — ASSESSMENT & PLAN NOTE
Wt Readings from Last 3 Encounters:   04/24/25 94.3 kg (208 lb)   04/11/25 98.5 kg (217 lb 2.5 oz)   03/24/25 107 kg (234 lb 12.6 oz)

## 2025-04-24 NOTE — PROGRESS NOTES
"Progress Note - Hospitalist   Name: Naresh Carpio 64 y.o. male I MRN: [id number]  Unit/Bed#: -01 I Date of Admission: 4/11/2025   Date of Service: 4/24/2025 I Hospital Day: 13    Assessment & Plan  Traumatic retroperitoneal hematoma  Admitted to the trauma service after a fall with L2 vertebral body and retroperitoneal hematoma.  S/p IR embolization of distal right deep circumflex iliac artery/distal right superior gluteal artery both of which supplied an area of active extravasation and then pseudoaneurysms present at distal branches of the left inferior gluteal artery. The artery was embolized using coils and gelfoam.   He was type and screened 4/17/25 in case needed blood during HD on that day.  Hgb 4/22 improved to 8.4  Anemia due to chronic kidney disease, on chronic dialysis ([facility])  Had 5 U PRBCs during hospital stay  Hemoglobin has been running mostly 7.3-7.4.    On 4/14/25 was 7.6; on 4/15/24 was 7.2 ---> Renal increased his Epogen in light of his hemoglobin  On 4/17, hemoglobin had increased to 7.7  4/19/25 Hgb up to 8.1.   CBC Tuesday in HD 4/22/25 = 8.4 and stable  ESRD (end stage renal disease) ([facility])  Continue Sevelamer TID with meals  Renal following  HD T-TH-Sat while here in rehab  Paroxysmal atrial fibrillation ([facility])  On no rate control medications  Examines RRR  Anticoagulation with Eliquis as at home but he doesn't want to take it because he is worried it will make his right eye vision worse.  I d/w on call Optho Dr Diaz = \"The risk is not quantified in the literature. Dr edmond's note suggests a diagnosis of proliferative diabetic retinopathy and bilateral and recurrent vitreous hemorrhage. So he is at risk for - and has had hemorrhage - whether or not he is on eliquis (assuming dr edmond is correct).     I would always recommend life and death are more important so to do what you recommend re his systemic condition, and then to follow up with dr edmond as he recommended so a real " "exam can be done in the clinic\".   I explained all this to him and he understands  He did take his Eliquis 4/23, this AM 4/24 but didn't take 4/22 or evening of 4/21.  Chronic diastolic (congestive) heart failure (HCC)  Appears euvolemic except for LE pitting edema which appears to be chronic  Renal diet  stable  Volume management via HD  Primary hypertension  Continue Procardia XL 30 mg qd  Tx per renal  stable  Visual disturbance  Reported a right eye \"floater\" without associated pain earlier in his hospitalization which has resolved but continued to have blurry central vision.  Ophthalmology saw here in [facility] = visual acuity without correction is 20/400 OU. Dx: Proliferative diabetic retinopathy with vitreous hemorrhage OU as well as cataracts.   Will need outpatient follow up immediately after discharge from rehab  Diabetic ulcer of left midfoot associated with type 2 diabetes mellitus, limited to breakdown of skin (HCC)  Continue local care  Being followed by WC  Last LEADS was 3/28/25 = 50-75% right prox polital and prox tibioperoneal trunk; >75% stenosis in prox popliteal artery on left  Was seen by VS in 12/2024 hospital stay and he declined any intervention at the time  Podiatry saw here on 4/22/25, s/p debridement = no surgical intervention needed at this time  Vascular surgery to see per Podiatry's recommendation  Hyponatremia  Renal following  Continue fluid restriction 1200 cc per 24 hours  Closed fracture of proximal end of left humerus with routine healing  Occurred from a fall in February and eval'd at the time of the injury  Was to continue NWB/sling and followup with Ortho but there was no followup done  Xray 4/13/25 = \"Unchanged alignment of the chronic greater tuberosity avulsion fracture\".   Ortho saw here on 4/14 = [facility]AT; see Dr Cisneros 4 weeks  Gout  Continue Allopurinol  L2 vertebral fracture (HCC)  CT CAP 4/2: Fracture of L2 vertebral body extending to the superior endplate  Continue LSO " brace  Insomnia  Patient reports not sleeping well at night because he has trouble getting comfortable. He has been trying to sleep in the recliner     The above assessment and plan was reviewed and updated as determined by my evaluation of the patient on 4/24/2025.    History of Present Illness   Patient seen and examined. Patients overnight issues or events were reviewed with nursing staff. New or overnight issues include the following:   No new or overnight issues.  Offers no complaints    Review of Systems   All other systems reviewed and are negative.      Objective :  Temp:  [97.8 °F (36.6 °C)-98.1 °F (36.7 °C)] 98 °F (36.7 °C)  HR:  [60-64] 62  BP: (124-146)/(55-66) 124/66  Resp:  [17-18] 18  SpO2:  [97 %-99 %] 97 %  O2 Device: None (Room air)    Invasive Devices       Hemodialysis Catheter  Duration             HD Permanent Double Catheter 603 days                    Physical Exam  General Appearance: no distress, nontoxic appearing  HEENT:  External ear normal.  Nose normal w/o drainage. Mucous membranes are moist. Oropharynx is clear. Conjunctiva clear w/o icterus or redness.  Neck:  Supple, normal ROM  Lungs: BBS without crackles/wheeze/rhonchi; respirations unlabored with normal inspiratory/expiratory effort.  No retractions noted.  On RA  CV: regular rate and rhythm; no rubs/murmurs/gallops, PMI normal   ABD: Abdomen is soft.  Bowel sounds all quadrants.  Nontender with no distention.    EXT: +LE edema  Skin: normal turgor, normal texture  Psych: affect normal, mood normal  Neuro: AAO       The above physical exam was reviewed and updated as determined by my evaluation of the patient on 4/24/2025.      Lab Results: I have reviewed the following results:  Results from last 7 days   Lab Units 04/22/25  1237 04/19/25  0818   WBC Thousand/uL 7.27 10.34*   HEMOGLOBIN g/dL 8.4* 8.1*   HEMATOCRIT % 26.4* 26.1*   PLATELETS Thousands/uL 327 361     Results from last 7 days   Lab Units 04/22/25  1237  04/19/25  0818   SODIUM mmol/L 129* 129*   POTASSIUM mmol/L 4.3 4.3   CHLORIDE mmol/L 89* 95*   CO2 mmol/L 29 23   BUN mg/dL 48* 37*   CREATININE mg/dL 7.18* 6.06*   CALCIUM mg/dL 8.9 9.2                   Imaging Results Review: No pertinent imaging studies reviewed.  Other Study Results Review: No additional pertinent studies reviewed.    Review of Scheduled Meds: Medications  reviewed and reconciled as needed  Current Facility-Administered Medications   Medication Dose Route Frequency Provider Last Rate    acetaminophen  975 mg Oral Q8H [facility] Damian Cerda MD      allopurinol  100 mg Oral Daily Damian Cerda MD      aluminum-magnesium hydroxide-simethicone  30 mL Oral Q4H PRN GRANT Church      apixaban  5 mg Oral BID Damian Cerda MD      atorvastatin  80 mg Oral Daily With Dinner Damian Cerda MD      bisacodyl  10 mg Rectal Daily PRN Michelle T Landa, DO      calcium carbonate  500 mg Oral Daily PRN GRANT Church      docusate sodium  100 mg Oral BID Michelle T Landa, DO      epoetin jessica  8,000 Units Intravenous After Dialysis German Linton MD      gabapentin  100 mg Oral Once per day on Monday Wednesday Friday Damian Cerda MD      lidocaine  1 patch Topical Daily PRN Damian Cerda MD      melatonin  6 mg Oral HS Jayden Rowland MD      methocarbamol  1,000 mg Oral Q8H [facility] Jayden Rowland MD      naloxone  0.04 mg Intravenous Q1MIN PRN Damian Cerda MD      NIFEdipine  30 mg Oral After Dinner Damian Cerda MD      ondansetron  4 mg Oral Q6H PRN Gino Austin PA-C      oxyCODONE  5 mg Oral Q4H PRN Damian Cerda MD      Or    oxyCODONE  10 mg Oral Q4H PRN Damian Cerda MD      oxyCODONE  2.5 mg Oral Q8H PRN Jayden Rowland MD      polyethylene glycol  17 g Oral Daily PRN Michelle T Landa, DO      senna  2 tablet Oral Daily With Lunch Michelle T Landa, DO      sevelamer  1,600 mg Oral TID With Meals Damian Cerda MD      white petrolatum-mineral oil   Topical TID PRN Michelle T Landa, DO          VTE Pharmacologic Prophylaxis: Eliquis  Code Status: Level 1 - Full Code  Current Length of Stay: 13 day(s)    Administrative Statements     ** Please Note:  voice to text software may have been used in the creation of this document. Although proof errors in transcription or interpretation are a potential of such software**

## 2025-04-24 NOTE — PROGRESS NOTES
Progress Note - PMR   Name: Naresh Carpio 64 y.o. male I MRN: [id number]  Unit/Bed#: -01 I Date of Admission: 4/11/2025   Date of Service: 4/24/2025 I Hospital Day: 13     Assessment & Plan  Traumatic retroperitoneal hematoma  - Sustained from mechanical fall several days prior to initial presentation on 4/2/25, presenting with progressively worsening low back pain. Trauma imaging revealed a large retroperitoneal hematoma with active extravasation and hemoglobin drop. Now s/p transfusion, Kcentra and ultimately IR embolization of the right deep circumflex iliac artery and superior gluteal artery with Dr. Garcia on 4/3/25. Unfortunately, on 4/6/25, his hemoglobin decreased and repeat CT showed a 5 mm inferior gluteal artery branch pseudoaneurysm and he is s/p IR pelvic angiogram on 4/7/25 with Dr. Hernandez, which demonstrated pseudoaneurysms at the distal branches of the left inferior gluteal artery which was embolized.   > 4/15: Hgb 7.2 today (from 7.6 yesterday). Back pain seems controlled. Will be receiving increased Epogen today per IM/Nephro  > Hgb 8.4 on 4/22 labs    - Continue to trend Hgb on CBC closely  - Consider repeat imaging if Hgb continues to downtrend or pain significantly worsens  - Contact IR if there are additional bleeding concerns given multiple prior interventions described above  - Local right hip/groin access site incision care  - Analgesia, see below  - PT and OT  Acute pain due to trauma  - Evaluated by APS on acute care  - Tylenol 975 mg q8h scheduled  - Gabapentin 100 mg three times weekly  - Methocarbamol 1000 mg q8h scheduled  - Lidocaine patches daily PRN  - Oxycodone 5 or 10 mg q4h PRN, wean as possible  Impaired mobility and activities of daily living  - Rehabilitation medicine physician for daily monitoring of care, 24 hour availability for acute medical issues, medication management, and therapeutic and diagnostic assessments.  - 24 hour rehabilitation nursing 7 days per week  for: management/teaching of medications, bowel/bladder routine, skin care.  - PT, OT for 2-3 hours per day, 5 to 6 days per week; 15 hours per week  - Rehabilitation Psychology as needed for adjustment and coping  - CM for barriers to discharge, community resources, and family support  - Discharge planning following to help ensure a safe and efficient discharge  - 900/7 program due to HD status, pain, expected therapy tolerance  4/18- Ongoing dispo planning with patient/sister and CM. Staff expressed that patient is progressing well from a functional standpoint but continues to have difficulties with ADLs/iADLs due to impaired vision. Will continue to work towards goals for safe dischare  4/22- DC date now pending outpatient HD setup    L2 vertebral fracture (HCC)  > Sustained from a mechanical fall on 3/24/25, found to have an acute, obliquely oriented fracture of the L2 vertebral body with distraction of the dominant fracture fragment and involvement of the anterior and posterior cortices and superior endplate  > At that time, managed non-operatively with LSO bracing, though it does not appear orthopedic spine surgery or neurosurgery evaluated at the time of injury    - Maintain LSO brace with lumbar spinal precautions   - Should follow-up with spine surgery outpatient  Closed fracture of proximal end of left humerus with routine healing  - Sustained from fall in early February, per CT of the LUE on 2/9/25, there was a minimally displaced fracture of the anterior facet of the greater tuberosity of the left humerus, minimally apparent on repeat x-ray of the shoulder on 3/24/25  - He was evaluated by orthopedic surgery at the time of the initial injury and was recommended to maintain NWB through the LUE with a sling  - There is no further orthopedic evaluation documentation and it does not appear that further follow-up occurred.  - Clinically, he is not describing ongoing significant left arm or shoulder pain and  "has been weight-bearing through the LUE  > 4/14: Repeat humerus X-ray done yesterday shows no change in fracture alignment. Ortho consulted.  >4/15: Ortho c/s: May WBAT to LUE. PT/OT to increase strength and ROM. F/u with Dr. Cisneros in 4 weeks    - Monitor  - PT/OT  Insomnia  Patient reports he has an overall a poor sleep hygiene: he sleeps at 1-2am nightly and watches TV up until bed.  Advised patient to attempt to regulate sleep/wake cycle while here to better participate in therapy. Attempt to try to sleep earlier around midnight  >4/15: reviewed sleep log: Pt went to sleep around midnight and slept for 6 hours.  > 4/17- Poor sleep last night: was awake every other hour. Requiring nap during the day. Can increase melatonin tomorrow if this persists  > 4/18: Poor sleep continues. Pt endorses moving from bed to chair multiple times throughout the night which is his baseline at home too    - Continue sleep log  - Continue melatonin 6mg nightly  At risk for constipation   4/15- Adjusted bowel medications: Colace 100mg BID, Senna 17.2mg with lunch and PRN Miralax/Suppos  4/17- No BM since 4/13. Lactulose this evening if no BM after lunch. Can consider Relistor if no success for opioid-inducted constipation  4/18- small hard BMs x2 yesterday after lactulose and suppository. Continue bowel regimen  >Last BM 4/22 but required Relistor and suppository/lactulose    - Will follow BMs and adjust bowel regimen to target soft, formed stools q1-2 days, per pt's regular schedule.  Anemia due to chronic kidney disease, on chronic dialysis (HCC)  > 4/14: Hgb 7.6  > 4/15: Hgb 7.2 - Discussed with IM who is coordinating with Nephrology; they will increase his Epoetin jessica  > 4/17: Hgb 7.7  > 4/19: Hgb 8.1    - Trend Hgb on CBC  - Epoetin jessica with dialysis  - Transfusions per IM, appreciate their recommendations  Visual disturbance  - Reporting \"floater\" within the right eye with acute onset on 4/11/25 at mid-day while watching " "TV; describes a dark, \"spider\"-shaped floater that occupies a significant portion of the visual field, painless; has similar floaters in the left eye but smaller  - Ophthalmology consulted on [facility] admission by IM, per prior trauma surgery documentation, Dr. Chaudhry of ophthalmology was made personally aware of clinical situation on 4/11/25 prior to [facility] admission  > Ophthalmology consult 4/12: Exam c/w proliferative diabetic retinopathy with vitreous hemorrhage. Pt will follow up with ophtho immediately after discharge and for referral to a retina specialist  > 4/15- Pt thinks his R eye floater may be larger today, unable to clearly state or quantify symptoms. No other visual complaints of increased number of floaters, double/blurry vision, or bright flashes. Advised patient to inform us if symptoms change - will reach out to Ophthalmology accordingly  > 4/18- No changes noted in vision/floaters. His overall impaired vision continues to be a barrier for his ADLs/iADLs  > 4/20-24- Stable R eye floater    - Monitor for changes in vision  Diabetic ulcer of left midfoot associated with type 2 diabetes mellitus, limited to breakdown of skin (HCC)  - Has chronic wounds affecting the left foot plantar surface, left foot 1st and 2nd digits  - Left Plantar Foot Wounds and Left toes:  Cleanse with NSS and pat dry. Paint all wounds with Betadine daily and cover plantar open wound with silicone bordered foam dressing. Naren with T for Treatment and change every other day or PRN   > 4/22 : Wound care recs: Paint left foot 1st and 2nd digits with betadine dailyand covering plantar open wound with foam. Podiatry consulted   > 4/22: Podiatry did bedside debridement of left foot plantar ulcer. Continue with local wound care, no surgical intervention needed. SALVATORE ordered and vascular surgery consulted. Pt to maintain frequent LLE elevation, strict pressure and fracture reduction. WBAT to LLE  > 4/24: Pictures of LLE reviewed on chart. " Wounds are stable with no signs of infection. XR L foot 4/22 with no evidence of OM. ABIs and toe pressures are within healing range. Pt to continue to follow up with wound care for further management  PAD (peripheral artery disease) (HCC)  > Non invasive arterial studies from 3/28:  Diffuse disease noted throughout the femoral-popliteal arteries. Evidence of 50-75% stenosis in the proximal popliteal artery. Evidence of 50-75% stenosis at the proximal tibio-peroneal trunk.   LLE: Diffuse disease noted throughout the femoral-popliteal arteries. Evidence of >75% stenosis in the proximal popliteal artery.  Tech Note: Patient refused SALVATORE.  SALVATORE 4/22: Left lower limb SALVATORE noncompressible, metatarsal pressure 155 mmHg, great toe pressure 72 mmHg. Right lower limb noncompressible SALVATORE, metatarsal pressure 158 mmHg, second toe pressure 101 mmHg   Vascular surgery consult pending  Hyponatremia  > 4/14: Na 129 - A&O x3, pt mentating appropriately  > 4/15: Na 125 - Discussed with IM who is coordinating with Nephrology; they will manage Na during HD   > Sodium levels continue to fluctuate from 125-131. Pt remains A&O x3. Nephrology will continue to manage electrolyte deficiencies    - Trend on BMP  - Electrolyte management per nephrology  - Continue HD in setting of ESRD  ESRD (end stage renal disease) (HCC)  - Continue HD  - Nephrology consulted and following, appreciate their recommendations  >4/22: new outpatient HD center needed close to his home after discharge. CM to follow up. DC pushed  Paroxysmal atrial fibrillation (HCC)  - Currently rate controlled  - Embolic stroke risk reduction with apixaban 5 mg BID    >4/22- Refusing it this morning. Extensive conversation had with patient to discuss the importance of compliance given his high stroke risk.   > 4/24- now compliant with Eliquis  Chronic diastolic (congestive) heart failure (HCC)  Wt Readings from Last 3 Encounters:   04/24/25 94.3 kg (208 lb)   04/11/25 98.5 kg (217  lb 2.5 oz)   03/24/25 107 kg (234 lb 12.6 oz)     - Most recent TTE from 10/9/24 with EF 60-65%, grade 1 diastolic dysfunction  - Volume management with HD per nephrology  - Monitor weights  - Appreciate medicine and nephrology recommendations and management  Primary hypertension  - Nifedipine  - Appreciate IM and nephrology management  Gout  - Allopurinol for flare prophylaxis  Hyperphosphatemia  > 4/15: Phos 4.6  - Management per nephrology  - Continue phosphate binders  Wound of skin  - Has right anterior pre-tibial scab, wash gently daily, leave open to air  - Wound care RN consulted and following  At high risk for skin breakdown  > 4/21- New sacral pressure injury noted by staff. Pictures reviewed on chart. Patient primarily sleeps in recliner (both here and at home). Discussed with therapy and pt will use a cushion that offers sacral relief on recliner. Will continue to monitor  >4/22- Wound care recs:  Cleanse wound with NSS, pat dry. Apply calcium alginate with silver to wound bed and cover with foam. Change every other day and prn. Cushion will not be covered per therapy for home - advised patient to find one off [employer] to continue offloading     - Moisturize skin daily with skin nourishing cream  - Ehob cushion in chair when out of bed.  - Preventative hydraguard to bilateral heels BID and PRN.   - Calazime paste to sacrum/buttocks BID and PRN  - Monitor clinically for breakdown, frequent turns  - Wound care RN consulted and following  At risk for venous thromboembolism (VTE)  - Apixaban, see above  Secondary hyperparathyroidism (HCC)      Subjective   64-year-old male with history of chronic anemia, atrial fibrillation, diabetes, end-stage renal disease on HD presenting on 4/2 with progressive worsening back pain with fall found to have left-sided retroperitoneal hematoma with active extravasation - status post Kcentra and stabilization transfusions. Required multiple IR embolizations. Course complicated  by acute on chronic pain     Chief Complaint: f/u ambulatory dysfunction    Interval: Patient seen and examined. No acute overnight events. Reports his sister brought in some OTC glasses for him, but he said they're not helping with his vision at all. Advised that he likely may need stronger prescription which ophthalmology can help with in the outpatient setting. Otherwise, sleep continues to be poor. Eating % of meals. Last BM 4/22. No new labs to review today.      Objective :  Temp:  [97.8 °F (36.6 °C)-98.1 °F (36.7 °C)] 98 °F (36.7 °C)  HR:  [60-64] 62  BP: (124-146)/(55-66) 124/66  Resp:  [17-18] 18  SpO2:  [97 %-99 %] 97 %  O2 Device: None (Room air)    Functional Update:  Participated in interdisciplinary team meeting today. DC pending acceptance at new  center.  Mobility: sup with bed mobility and ambulation with RW (150')  Transfers: supervision with RW  ADLs: Ind eating, Rin grooming/bathing/UBD, modA LBD, max A toileting    Physical Exam  Vitals reviewed.   Constitutional:       Appearance: Normal appearance.   HENT:      Head: Normocephalic and atraumatic.      Right Ear: External ear normal.      Left Ear: External ear normal.      Nose: Nose normal.      Mouth/Throat:      Mouth: Mucous membranes are moist.      Pharynx: Oropharynx is clear.   Eyes:      Conjunctiva/sclera: Conjunctivae normal.   Cardiovascular:      Rate and Rhythm: Normal rate and regular rhythm.      Heart sounds: Normal heart sounds.   Pulmonary:      Effort: Pulmonary effort is normal. No respiratory distress.      Breath sounds: Normal breath sounds. No wheezing.   Abdominal:      General: Bowel sounds are normal. There is no distension.      Palpations: Abdomen is soft.   Musculoskeletal:         General: Normal range of motion.      Comments: ACE wraps on bilateral LE's    Skin:     General: Skin is warm and dry.   Neurological:      Mental Status: He is alert. Mental status is at baseline.   Psychiatric:          Behavior: Behavior normal.         Scheduled Meds:  Current Facility-Administered Medications   Medication Dose Route Frequency Provider Last Rate    acetaminophen  975 mg Oral Q8H [facility] Damian Cerda MD      allopurinol  100 mg Oral Daily Damian Cerda MD      aluminum-magnesium hydroxide-simethicone  30 mL Oral Q4H PRN GRANT Church      apixaban  5 mg Oral BID Damian Cerda MD      atorvastatin  80 mg Oral Daily With Dinner Damian Cerda MD      bisacodyl  10 mg Rectal Daily PRN Michelle Landa DO      calcium carbonate  500 mg Oral Daily PRN GRANT Church      docusate sodium  100 mg Oral BID Michelle Landa, DO      epoetin jessica  8,000 Units Intravenous After Dialysis German Linton MD      gabapentin  100 mg Oral Once per day on Monday Wednesday Friday Damian Cerda MD      lidocaine  1 patch Topical Daily PRN Damian Cerda MD      melatonin  6 mg Oral HS Jayden Rowland MD      methocarbamol  1,000 mg Oral Q8H [facility] Jayden Rowland MD      naloxone  0.04 mg Intravenous Q1MIN PRN Damian Cerda MD      NIFEdipine  30 mg Oral After Dinner Damian Cerda MD      ondansetron  4 mg Oral Q6H PRN Gino Austin PA-C      oxyCODONE  5 mg Oral Q4H PRN Damian Cerda MD      Or    oxyCODONE  10 mg Oral Q4H PRN Damian Cerda MD      oxyCODONE  2.5 mg Oral Q8H PRN Jayden Rowland MD      polyethylene glycol  17 g Oral Daily PRN Michelle Landa,       senna  2 tablet Oral Daily With Lunch Michelle Landa, DO      sevelamer  1,600 mg Oral TID With Meals Damian Cerda MD      white petrolatum-mineral oil   Topical TID PRN Michelle Landa DO           Lab Results: I have reviewed the following results:  Results from last 7 days   Lab Units 04/22/25  1237 04/19/25  0818 04/17/25  1248   HEMOGLOBIN g/dL 8.4* 8.1* 7.7*   HEMATOCRIT % 26.4* 26.1* 24.5*   WBC Thousand/uL 7.27 10.34* 7.96   PLATELETS Thousands/uL 327 361 324     Results from last 7 days   Lab Units 04/22/25  1237 04/19/25  0818 04/17/25  1248    BUN mg/dL 48* 37* 47*   SODIUM mmol/L 129* 129* 131*   POTASSIUM mmol/L 4.3 4.3 4.2   CHLORIDE mmol/L 89* 95* 94*   CREATININE mg/dL 7.18* 6.06* 6.83*

## 2025-04-24 NOTE — ASSESSMENT & PLAN NOTE
Lab Results   Component Value Date    HGBA1C 5.7 (H) 02/09/2025       Multiple wounds to bilateral feet, in various stages of healing. Hx of R GT amp from OM  Non-tender when non-ambulatory  Podiatry evaluated, LWC and signed off  LEADs results within healing range    Images from media from 4/24:  Wounds to L GT and hallux appear in various stages of healing, R pretibial wound noted.

## 2025-04-25 LAB
HBV CORE AB SER QL: NORMAL
HBV CORE IGM SER QL: NORMAL
HBV SURFACE AB SER-ACNC: 53.9 MIU/ML
HBV SURFACE AG SER QL: NORMAL
HCV AB SER QL: NORMAL

## 2025-04-25 PROCEDURE — 97116 GAIT TRAINING THERAPY: CPT

## 2025-04-25 PROCEDURE — 99232 SBSQ HOSP IP/OBS MODERATE 35: CPT | Performed by: NURSE PRACTITIONER

## 2025-04-25 PROCEDURE — 97530 THERAPEUTIC ACTIVITIES: CPT

## 2025-04-25 PROCEDURE — 99232 SBSQ HOSP IP/OBS MODERATE 35: CPT | Performed by: STUDENT IN AN ORGANIZED HEALTH CARE EDUCATION/TRAINING PROGRAM

## 2025-04-25 PROCEDURE — 97535 SELF CARE MNGMENT TRAINING: CPT

## 2025-04-25 PROCEDURE — 97110 THERAPEUTIC EXERCISES: CPT

## 2025-04-25 RX ADMIN — DOCUSATE SODIUM 100 MG: 100 CAPSULE, LIQUID FILLED ORAL at 17:47

## 2025-04-25 RX ADMIN — APIXABAN 5 MG: 5 TABLET, FILM COATED ORAL at 17:47

## 2025-04-25 RX ADMIN — METHOCARBAMOL 1000 MG: 500 TABLET ORAL at 06:40

## 2025-04-25 RX ADMIN — SEVELAMER HYDROCHLORIDE 1600 MG: 800 TABLET ORAL at 08:33

## 2025-04-25 RX ADMIN — ATORVASTATIN CALCIUM 80 MG: 80 TABLET, FILM COATED ORAL at 17:47

## 2025-04-25 RX ADMIN — Medication 6 MG: at 22:33

## 2025-04-25 RX ADMIN — APIXABAN 5 MG: 5 TABLET, FILM COATED ORAL at 08:33

## 2025-04-25 RX ADMIN — DOCUSATE SODIUM 100 MG: 100 CAPSULE, LIQUID FILLED ORAL at 08:32

## 2025-04-25 RX ADMIN — METHOCARBAMOL 1000 MG: 500 TABLET ORAL at 12:45

## 2025-04-25 RX ADMIN — METHOCARBAMOL 1000 MG: 500 TABLET ORAL at 22:33

## 2025-04-25 RX ADMIN — SEVELAMER HYDROCHLORIDE 1600 MG: 800 TABLET ORAL at 12:42

## 2025-04-25 RX ADMIN — ACETAMINOPHEN 975 MG: 325 TABLET, FILM COATED ORAL at 06:41

## 2025-04-25 RX ADMIN — NIFEDIPINE 30 MG: 30 TABLET, FILM COATED, EXTENDED RELEASE ORAL at 17:47

## 2025-04-25 RX ADMIN — ACETAMINOPHEN 975 MG: 325 TABLET, FILM COATED ORAL at 12:46

## 2025-04-25 RX ADMIN — ALLOPURINOL 100 MG: 100 TABLET ORAL at 08:32

## 2025-04-25 RX ADMIN — Medication 2.5 MG: at 00:34

## 2025-04-25 RX ADMIN — ACETAMINOPHEN 975 MG: 325 TABLET, FILM COATED ORAL at 22:33

## 2025-04-25 RX ADMIN — SEVELAMER HYDROCHLORIDE 1600 MG: 800 TABLET ORAL at 17:49

## 2025-04-25 RX ADMIN — OXYCODONE HYDROCHLORIDE 10 MG: 10 TABLET ORAL at 14:57

## 2025-04-25 RX ADMIN — GABAPENTIN 100 MG: 100 CAPSULE ORAL at 08:32

## 2025-04-25 RX ADMIN — OXYCODONE HYDROCHLORIDE 10 MG: 10 TABLET ORAL at 06:41

## 2025-04-25 NOTE — ASSESSMENT & PLAN NOTE
Vascular surgery saw per Podiatry's recommendation = they will see in the office.  They advised start ASA but after the d/w the PA in relation to his vitreous hemorrhages, can hold off on that and d/w him in the office.  This will give him time to see the Retina specialist as OP as about getting tx

## 2025-04-25 NOTE — PROGRESS NOTES
"Progress Note - Hospitalist   Name: Naresh Carpio 64 y.o. male I MRN: [id number]  Unit/Bed#: -01 I Date of Admission: 4/11/2025   Date of Service: 4/25/2025 I Hospital Day: 14    Assessment & Plan  Traumatic retroperitoneal hematoma  Admitted to the trauma service after a fall with L2 vertebral body and retroperitoneal hematoma.  S/p IR embolization of distal right deep circumflex iliac artery/distal right superior gluteal artery both of which supplied an area of active extravasation and then pseudoaneurysms present at distal branches of the left inferior gluteal artery. The artery was embolized using coils and gelfoam.   He was type and screened 4/17/25 in case needed blood during HD on that day.  Hgb 4/22 improved to 8.4  Anemia due to chronic kidney disease, on chronic dialysis ([facility])  Had 5 U PRBCs during hospital stay  Hemoglobin has been running mostly 7.3-7.4.    On 4/14/25 was 7.6; on 4/15/24 was 7.2 ---> Renal increased his Epogen in light of his hemoglobin  On 4/17, hemoglobin had increased to 7.7  4/19/25 Hgb up to 8.1.   CBC Tuesday in HD 4/22/25 = 8.4 and stable  ESRD (end stage renal disease) ([facility])  Continue Sevelamer TID with meals  Renal following  HD T-TH-Sat while here in rehab  Paroxysmal atrial fibrillation ([facility])  On no rate control medications  Examines RRR  Anticoagulation with Eliquis as at home but he doesn't want to take it because he is worried it will make his right eye vision worse.  I d/w on call Optho Dr Diaz = \"The risk is not quantified in the literature. Dr edmond's note suggests a diagnosis of proliferative diabetic retinopathy and bilateral and recurrent vitreous hemorrhage. So he is at risk for - and has had hemorrhage - whether or not he is on eliquis (assuming dr edmond is correct).     I would always recommend life and death are more important so to do what you recommend re his systemic condition, and then to follow up with dr edmond as he recommended so a real " "exam can be done in the clinic\".   I explained all this to him and he understands  He did take his Eliquis 4/23, this AM 4/24 but didn't take 4/22 or evening of 4/21.  Chronic diastolic (congestive) heart failure (HCC)  Appears euvolemic except for LE pitting edema which appears to be chronic  Renal diet  stable  Volume management via HD  Primary hypertension  Continue Procardia XL 30 mg qd  Tx per renal  stable  Visual disturbance  Reported a right eye \"floater\" without associated pain earlier in his hospitalization which has resolved but continued to have blurry central vision.  Ophthalmology saw here in [facility] = visual acuity without correction is 20/400 OU. Dx: Proliferative diabetic retinopathy with vitreous hemorrhage OU as well as cataracts.   Will need outpatient follow up immediately after discharge from rehab  Diabetic ulcer of left midfoot associated with type 2 diabetes mellitus, limited to breakdown of skin (HCC)  Continue local care  Being followed by WC  Last LEADS was 3/28/25 = 50-75% right prox polital and prox tibioperoneal trunk; >75% stenosis in prox popliteal artery on left  Was seen by VS in 12/2024 hospital stay and he declined any intervention at the time  Podiatry saw here on 4/22/25, s/p debridement = no surgical intervention needed at this time  See below  Hyponatremia  Renal following  Continue fluid restriction 1200 cc per 24 hours  Closed fracture of proximal end of left humerus with routine healing  Occurred from a fall in February and eval'd at the time of the injury  Was to continue NWB/sling and followup with Ortho but there was no followup done  Xray 4/13/25 = \"Unchanged alignment of the chronic greater tuberosity avulsion fracture\".   Ortho saw here on 4/14 = WBAT; see Dr Cisneros 4 weeks  Gout  Continue Allopurinol  L2 vertebral fracture (HCC)  CT CAP 4/2: Fracture of L2 vertebral body extending to the superior endplate  Continue LSO brace  Insomnia  Patient reports not sleeping " well at night because he has trouble getting comfortable. He has been trying to sleep in the recliner   PAD (peripheral artery disease) (HCC)  Vascular surgery saw per Podiatry's recommendation = they will see in the office.  They advised start ASA but after the d/w the PA in relation to his vitreous hemorrhages, can hold off on that and d/w him in the office.  This will give him time to see the Retina specialist as OP as about getting tx    The above assessment and plan was reviewed and updated as determined by my evaluation of the patient on 4/25/2025.    History of Present Illness   Patient seen and examined. Patients overnight issues or events were reviewed with nursing staff. New or overnight issues include the following:   No new or overnight issues.  Offers no complaints    Review of Systems   All other systems reviewed and are negative.      Objective :  Temp:  [97.1 °F (36.2 °C)-98 °F (36.7 °C)] 98 °F (36.7 °C)  HR:  [60-71] 61  BP: (126-160)/(60-84) 160/60  Resp:  [16-18] 17  SpO2:  [99 %-100 %] 100 %  O2 Device: None (Room air)    Invasive Devices       Hemodialysis Catheter  Duration             HD Permanent Double Catheter 604 days                    Physical Exam  General Appearance: no distress, non toxic appearing  HEENT: PERRLA, conjuctiva normal; oropharynx clear; mucous membranes moist   Neck:  Supple, normal ROM  Lungs: CTA, normal respiratory effort, no retractions, expiratory effort normal  CV: regular rate and rhythm; no rubs/murmurs/gallops, PMI normal   ABD: soft; ND/NT; +BS  EXT: + LE edema  Skin: normal turgor, normal texture  Psych: affect normal, mood normal  Neuro: AAO        The above physical exam was reviewed and updated as determined by my evaluation of the patient on 4/25/2025.      Lab Results: I have reviewed the following results:  Results from last 7 days   Lab Units 04/22/25  1237 04/19/25  0818   WBC Thousand/uL 7.27 10.34*   HEMOGLOBIN g/dL 8.4* 8.1*   HEMATOCRIT % 26.4*  26.1*   PLATELETS Thousands/uL 327 361     Results from last 7 days   Lab Units 04/22/25  1237 04/19/25  0818   SODIUM mmol/L 129* 129*   POTASSIUM mmol/L 4.3 4.3   CHLORIDE mmol/L 89* 95*   CO2 mmol/L 29 23   BUN mg/dL 48* 37*   CREATININE mg/dL 7.18* 6.06*   CALCIUM mg/dL 8.9 9.2                   Imaging Results Review: No pertinent imaging studies reviewed.  Other Study Results Review: No additional pertinent studies reviewed.    Review of Scheduled Meds: Medications  reviewed and reconciled as needed  Current Facility-Administered Medications   Medication Dose Route Frequency Provider Last Rate    acetaminophen  975 mg Oral Q8H [facility] Damian Cerda MD      allopurinol  100 mg Oral Daily Damian Cerda MD      aluminum-magnesium hydroxide-simethicone  30 mL Oral Q4H PRN GRANT Church      apixaban  5 mg Oral BID Damian Cerda MD      atorvastatin  80 mg Oral Daily With Dinner Damian Cerda MD      bisacodyl  10 mg Rectal Daily PRN Michelle T Landa, DO      calcium carbonate  500 mg Oral Daily PRN GRANT Church      docusate sodium  100 mg Oral BID Michelle LAMONTE Landa, DO      epoetin jessica  8,000 Units Intravenous After Dialysis German Linton MD      gabapentin  100 mg Oral Once per day on Monday Wednesday Friday Damian Cerda MD      lidocaine  1 patch Topical Daily PRN Damian Cerda MD      melatonin  6 mg Oral HS Jayden Rowland MD      methocarbamol  1,000 mg Oral Q8H [facility] Jayden Rowland MD      naloxone  0.04 mg Intravenous Q1MIN PRN Damian Cerda MD      NIFEdipine  30 mg Oral After Dinner Damian Cerda MD      ondansetron  4 mg Oral Q6H PRN Gino Austin PA-C      oxyCODONE  5 mg Oral Q4H PRN Damian Cerda MD      Or    oxyCODONE  10 mg Oral Q4H PRN Damian Cerda MD      oxyCODONE  2.5 mg Oral Q8H PRN Jayden Rowland MD      polyethylene glycol  17 g Oral Daily PRN Michelle T Landa, DO      senna  2 tablet Oral Daily With Lunch Michelle T Landa, DO      sevelamer  1,600 mg Oral TID With  Meals Damian Cerda MD      white petrolatum-mineral oil   Topical TID PRN Michelle Landa DO         VTE Pharmacologic Prophylaxis: Eliquis  Code Status: Level 1 - Full Code  Current Length of Stay: 14 day(s)    Administrative Statements     ** Please Note:  voice to text software may have been used in the creation of this document. Although proof errors in transcription or interpretation are a potential of such software**

## 2025-04-25 NOTE — ASSESSMENT & PLAN NOTE
Volume: Hypervolemic  Blood pressure: Hypertensive, /60  UF on HD tomorrow   Low-sodium diet   Nifedipine 30 mg daily

## 2025-04-25 NOTE — ASSESSMENT & PLAN NOTE
Current hemoglobin: 8.4 mg/dL   Treatments :  Outpatient Epogen  Transfuse for hemoglobin less than 7.0 per primary service

## 2025-04-25 NOTE — CASE MANAGEMENT
"Referral info faxed to [facility] at [phone], hep b panel results still pending and will be sent when received. Awaiting confirmation of services.     Met w/pt and secured [place] address, [address] [place] [id number]. Pt requested a dc later next week as he has \"things to get in place before he goes home\".  Cm could not guarantee the dc as he is ready to dc to home and we're just waiting to get services in place.  Pt stated he knows physically he is ready to go but is sharing he isn't prepared. Cm asked what he needed to do to get prepared and he was unable to answer. Cm suggested he work on one thing a day during his down time and perhaps that will help with feeling prepared.  Pt then waved his hand and dismissed cm from the room.   "

## 2025-04-25 NOTE — PROGRESS NOTES
Progress Note - Nephrology   Name: Naresh Carpio 64 y.o. male I MRN: [id number]  Unit/Bed#: -01 I Date of Admission: 4/11/2025   Date of Service: 4/25/2025 I Hospital Day: 14     Assessment & Plan  ESRD (end stage renal disease) (HCC)  #ESRD on HD TTS:  Dialysis unit/days: [facility]  Access: PermCath  Patient had dialysis yesterday, terminated early due to generalized pain   Advised patient to complete treatment to avoid fluid overload  Plan for next dialysis tomorrow  Fluid restriction 1.8 L a day  Adjust medications to GFR<10  Avoid opioids     Primary hypertension  Volume: Hypervolemic  Blood pressure: Hypertensive, /60  UF on HD tomorrow   Low-sodium diet   Nifedipine 30 mg daily   Anemia due to chronic kidney disease, on chronic dialysis ([facility])  Current hemoglobin: 8.4 mg/dL   Treatments :  Outpatient Epogen  Transfuse for hemoglobin less than 7.0 per primary service      Traumatic retroperitoneal hematoma  S/p IR embolization  Resolved  Hemoglobin stable  Secondary hyperparathyroidism ([facility])  Sevelamer with meals 3 times daily     PAD (peripheral artery disease) ([facility])      I have reviewed the nephrology recommendations including dialysis tomorrow, with primary team, and we are in agreement with renal plan including the information outlined above. I have discussed the above management plan in detail with the primary service.     Subjective   Brief History of Admission - 63 yo man with PMH of ESRD on dialysis TTS admitted after a fall gated with hematoma. Nephrology is consulted for management of ESRD     Patient had dialysis yesterday, requested to terminate early due to generalized pain.  No shortness of breath this morning    Objective :  Temp:  [97.1 °F (36.2 °C)-98 °F (36.7 °C)] 98 °F (36.7 °C)  HR:  [60-71] 61  BP: (126-160)/(60-84) 160/60  Resp:  [16-18] 17  SpO2:  [99 %-100 %] 100 %  O2 Device: None (Room air)    Current Weight: Weight - Scale: 94.3 kg (208 lb)  First Weight: Weight - Scale:  98.9 kg (218 lb)  I/O         04/23 0701  04/24 0700 04/24 0701  04/25 0700 04/25 0701  04/26 0700    P.O. 220 120 240    I.V. (mL/kg)  500 (5.3)     Total Intake(mL/kg) 220 (2.3) 620 (6.6) 240 (2.5)    Urine (mL/kg/hr) 100 (0) 150 (0.1)     Other  1552     Stool  0     Total Output 100 1702     Net +120 -1082 +240           Unmeasured Stool Occurrence  1 x           Physical Exam  General:  no acute distress at this time  Skin:  No acute rash  Eyes:  No scleral icterus and noninjected  ENT:  mucous membranes moist  Neck:  no carotid bruits  Chest:  Clear to auscultation percussion, good respiratory effort, no use of accessory respiratory muscles  CVS:  Regular rate and rhythm without rub   Abdomen:  soft and nontender   Extremities:  lower extremity edema  Neuro:  No gross focality  Psych:  Alert , cooperative   Dialysis access PermCath    Medications:    Current Facility-Administered Medications:     acetaminophen (TYLENOL) tablet 975 mg, 975 mg, Oral, Q8H KEMAL, Damian Cerda MD, 975 mg at 04/25/25 0641    allopurinol (ZYLOPRIM) tablet 100 mg, 100 mg, Oral, Daily, Damian Cerda MD, 100 mg at 04/25/25 0832    aluminum-magnesium hydroxide-simethicone (MAALOX) oral suspension 30 mL, 30 mL, Oral, Q4H PRN, GRANT Church, 30 mL at 04/24/25 1351    apixaban (ELIQUIS) tablet 5 mg, 5 mg, Oral, BID, Damian Cerda MD, 5 mg at 04/25/25 0833    atorvastatin (LIPITOR) tablet 80 mg, 80 mg, Oral, Daily With Dinner, Damian Cerda MD, 80 mg at 04/24/25 1655    bisacodyl (DULCOLAX) rectal suppository 10 mg, 10 mg, Rectal, Daily PRN, Michelle Landa DO, 10 mg at 04/18/25 0000    calcium carbonate (TUMS) chewable tablet 500 mg, 500 mg, Oral, Daily PRN, GRANT Church, 500 mg at 04/24/25 1131    docusate sodium (COLACE) capsule 100 mg, 100 mg, Oral, BID, Michelle Landa DO, 100 mg at 04/25/25 0832    epoetin jessica (EPOGEN,PROCRIT) injection 8,000 Units, 8,000 Units, Intravenous, After Dialysis, German Linton MD, 8,000  Units at 04/24/25 1457    gabapentin (NEURONTIN) capsule 100 mg, 100 mg, Oral, Once per day on Monday Wednesday Friday, Damian Cerda MD, 100 mg at 04/25/25 0832    lidocaine (LIDODERM) 5 % patch 1 patch, 1 patch, Topical, Daily PRN, Damian Cerda MD, 1 patch at 04/18/25 2135    melatonin tablet 6 mg, 6 mg, Oral, HS, Jayden Rowland MD, 6 mg at 04/24/25 2201    methocarbamol (ROBAXIN) tablet 1,000 mg, 1,000 mg, Oral, Q8H KEMAL, Jayden Rowland MD, 1,000 mg at 04/25/25 0640    naloxone (NARCAN) 0.04 mg/mL syringe 0.04 mg, 0.04 mg, Intravenous, Q1MIN PRN, Damian Cerda MD    NIFEdipine (PROCARDIA XL) 24 hr tablet 30 mg, 30 mg, Oral, After Dinner, Damian Cerda MD, 30 mg at 04/24/25 1656    ondansetron (ZOFRAN-ODT) dispersible tablet 4 mg, 4 mg, Oral, Q6H PRN, Gino Austin PA-C, 4 mg at 04/20/25 0023    oxyCODONE (ROXICODONE) IR tablet 5 mg, 5 mg, Oral, Q4H PRN, 5 mg at 04/22/25 2006 **OR** oxyCODONE (ROXICODONE) immediate release tablet 10 mg, 10 mg, Oral, Q4H PRN, Damian Cerda MD, 10 mg at 04/25/25 0641    oxyCODONE (ROXICODONE) split tablet 2.5 mg, 2.5 mg, Oral, Q8H PRN, Jayden Rowland MD, 2.5 mg at 04/25/25 0034    polyethylene glycol (MIRALAX) packet 17 g, 17 g, Oral, Daily PRN, Michelle Landa DO, 17 g at 04/17/25 0845    senna (SENOKOT) tablet 17.2 mg, 2 tablet, Oral, Daily With Lunch, Michelle Landa DO, 17.2 mg at 04/24/25 1131    sevelamer (RENAGEL) tablet 1,600 mg, 1,600 mg, Oral, TID With Meals, Damian Cerda MD, 1,600 mg at 04/25/25 0833    white petrolatum-mineral oil (EUCERIN,HYDROCERIN) cream, , Topical, TID PRN, Michelle Landa DO      Lab Results: I have reviewed the following results:  Results from last 7 days   Lab Units 04/22/25  1237 04/19/25  0818   WBC Thousand/uL 7.27 10.34*   HEMOGLOBIN g/dL 8.4* 8.1*   HEMATOCRIT % 26.4* 26.1*   PLATELETS Thousands/uL 327 361   POTASSIUM mmol/L 4.3 4.3   CHLORIDE mmol/L 89* 95*   CO2 mmol/L 29 23   BUN mg/dL 48* 37*   CREATININE mg/dL 7.18*  "6.06*   CALCIUM mg/dL 8.9 9.2       Administrative Statements     Portions of the record may have been created with voice recognition software. Occasional wrong word or \"sound a like\" substitutions may have occurred due to the inherent limitations of voice recognition software. Read the chart carefully and recognize, using context, where substitutions have occurred.If you have any questions, please contact the dictating provider.  "

## 2025-04-25 NOTE — PROGRESS NOTES
Progress Note - PMR   Name: Naresh Carpio 64 y.o. male I MRN: [id number]  Unit/Bed#: -01 I Date of Admission: 4/11/2025   Date of Service: 4/25/2025 I Hospital Day: 14     Assessment & Plan  Traumatic retroperitoneal hematoma  - Sustained from mechanical fall several days prior to initial presentation on 4/2/25, presenting with progressively worsening low back pain. Trauma imaging revealed a large retroperitoneal hematoma with active extravasation and hemoglobin drop. Now s/p transfusion, Kcentra and ultimately IR embolization of the right deep circumflex iliac artery and superior gluteal artery with Dr. Garcia on 4/3/25. Unfortunately, on 4/6/25, his hemoglobin decreased and repeat CT showed a 5 mm inferior gluteal artery branch pseudoaneurysm and he is s/p IR pelvic angiogram on 4/7/25 with Dr. Hernandez, which demonstrated pseudoaneurysms at the distal branches of the left inferior gluteal artery which was embolized.   > 4/15: Hgb 7.2 today (from 7.6 yesterday). Back pain seems controlled. Will be receiving increased Epogen today per IM/Nephro  > Hgb 8.4 on 4/22 labs    - Continue to trend Hgb on CBC closely  - Consider repeat imaging if Hgb continues to downtrend or pain significantly worsens  - Contact IR if there are additional bleeding concerns given multiple prior interventions described above  - Local right hip/groin access site incision care  - Analgesia, see below  - PT and OT  Acute pain due to trauma  - Evaluated by APS on acute care  - Tylenol 975 mg q8h scheduled  - Gabapentin 100 mg three times weekly  - Methocarbamol 1000 mg q8h scheduled  - Lidocaine patches daily PRN  - Oxycodone 5 or 10 mg q4h PRN, wean as possible  Impaired mobility and activities of daily living  - Rehabilitation medicine physician for daily monitoring of care, 24 hour availability for acute medical issues, medication management, and therapeutic and diagnostic assessments.  - 24 hour rehabilitation nursing 7 days per week  for: management/teaching of medications, bowel/bladder routine, skin care.  - PT, OT for 2-3 hours per day, 5 to 6 days per week; 15 hours per week  - Rehabilitation Psychology as needed for adjustment and coping  - CM for barriers to discharge, community resources, and family support  - Discharge planning following to help ensure a safe and efficient discharge  - 900/7 program due to HD status, pain, expected therapy tolerance  4/18- Ongoing dispo planning with patient/sister and CM. Staff expressed that patient is progressing well from a functional standpoint but continues to have difficulties with ADLs/iADLs due to impaired vision. Will continue to work towards goals for safe dischare  4/22- DC date now pending outpatient HD setup    L2 vertebral fracture (HCC)  > Sustained from a mechanical fall on 3/24/25, found to have an acute, obliquely oriented fracture of the L2 vertebral body with distraction of the dominant fracture fragment and involvement of the anterior and posterior cortices and superior endplate  > At that time, managed non-operatively with LSO bracing, though it does not appear orthopedic spine surgery or neurosurgery evaluated at the time of injury    - Maintain LSO brace with lumbar spinal precautions   - Should follow-up with spine surgery outpatient  Closed fracture of proximal end of left humerus with routine healing  - Sustained from fall in early February, per CT of the LUE on 2/9/25, there was a minimally displaced fracture of the anterior facet of the greater tuberosity of the left humerus, minimally apparent on repeat x-ray of the shoulder on 3/24/25  - He was evaluated by orthopedic surgery at the time of the initial injury and was recommended to maintain NWB through the LUE with a sling  - There is no further orthopedic evaluation documentation and it does not appear that further follow-up occurred.  - Clinically, he is not describing ongoing significant left arm or shoulder pain and  "has been weight-bearing through the LUE  > 4/14: Repeat humerus X-ray done yesterday shows no change in fracture alignment. Ortho consulted.  >4/15: Ortho c/s: May WBAT to LUE. PT/OT to increase strength and ROM. F/u with Dr. Cisneros in 4 weeks    - Monitor  - PT/OT  Insomnia  Patient reports he has an overall a poor sleep hygiene: he sleeps at 1-2am nightly and watches TV up until bed.  Advised patient to attempt to regulate sleep/wake cycle while here to better participate in therapy. Attempt to try to sleep earlier around midnight  >4/15: reviewed sleep log: Pt went to sleep around midnight and slept for 6 hours.  > 4/17- Poor sleep last night: was awake every other hour. Requiring nap during the day. Can increase melatonin tomorrow if this persists  > 4/18: Poor sleep continues. Pt endorses moving from bed to chair multiple times throughout the night which is his baseline at home too    - Continue sleep log  - Continue melatonin 6mg nightly  At risk for constipation   4/15- Adjusted bowel medications: Colace 100mg BID, Senna 17.2mg with lunch and PRN Miralax/Suppos  4/17- No BM since 4/13. Lactulose this evening if no BM after lunch. Can consider Relistor if no success for opioid-inducted constipation  4/18- small hard BMs x2 yesterday after lactulose and suppository. Continue bowel regimen  >BM 4/22 but required Relistor and suppository/lactulose  > Last BM 4/25    - Will follow BMs and adjust bowel regimen to target soft, formed stools q1-2 days, per pt's regular schedule.  Anemia due to chronic kidney disease, on chronic dialysis (HCC)  > 4/14: Hgb 7.6  > 4/15: Hgb 7.2 - Discussed with IM who is coordinating with Nephrology; they will increase his Epoetin jessica  > 4/17: Hgb 7.7  > 4/22: Hgb 8.4    - Trend Hgb on CBC  - Epoetin jessica with dialysis  - Transfusions per IM, appreciate their recommendations  Visual disturbance  - Reporting \"floater\" within the right eye with acute onset on 4/11/25 at mid-day while " "watching TV; describes a dark, \"spider\"-shaped floater that occupies a significant portion of the visual field, painless; has similar floaters in the left eye but smaller  - Ophthalmology consulted on [facility] admission by IM, per prior trauma surgery documentation, Dr. Chaudhry of ophthalmology was made personally aware of clinical situation on 4/11/25 prior to [facility] admission  > Ophthalmology consult 4/12: Exam c/w proliferative diabetic retinopathy with vitreous hemorrhage. Pt will follow up with ophtho immediately after discharge and for referral to a retina specialist  > 4/15- Pt thinks his R eye floater may be larger today, unable to clearly state or quantify symptoms. No other visual complaints of increased number of floaters, double/blurry vision, or bright flashes. Advised patient to inform us if symptoms change - will reach out to Ophthalmology accordingly  > 4/18- No changes noted in vision/floaters. His overall impaired vision continues to be a barrier for his ADLs/iADLs  > 4/20-24- Stable R eye floater    - Monitor for changes in vision  Diabetic ulcer of left midfoot associated with type 2 diabetes mellitus, limited to breakdown of skin (HCC)  - Has chronic wounds affecting the left foot plantar surface, left foot 1st and 2nd digits  - Left Plantar Foot Wounds and Left toes:  Cleanse with NSS and pat dry. Paint all wounds with Betadine daily and cover plantar open wound with silicone bordered foam dressing. Naren with T for Treatment and change every other day or PRN   > 4/22 : Wound care recs: Paint left foot 1st and 2nd digits with betadine dailyand covering plantar open wound with foam. Podiatry consulted   > 4/22: Podiatry did bedside debridement of left foot plantar ulcer. Continue with local wound care, no surgical intervention needed. SALVATORE ordered and vascular surgery consulted. Pt to maintain frequent LLE elevation, strict pressure and fracture reduction. WBAT to LLE  > 4/24: Pictures of LLE reviewed on " chart. Wounds are stable with no signs of infection. XR L foot 4/22 with no evidence of OM. ABIs and toe pressures are within healing range. Pt to continue to follow up with wound care for further management  PAD (peripheral artery disease) (HCC)  > Non invasive arterial studies from 3/28:  Diffuse disease noted throughout the femoral-popliteal arteries. Evidence of 50-75% stenosis in the proximal popliteal artery. Evidence of 50-75% stenosis at the proximal tibio-peroneal trunk.   LLE: Diffuse disease noted throughout the femoral-popliteal arteries. Evidence of >75% stenosis in the proximal popliteal artery.  Tech Note: Patient refused SALVATORE.  SALVATORE 4/22: Left lower limb SALVATORE noncompressible, metatarsal pressure 155 mmHg, great toe pressure 72 mmHg. Right lower limb noncompressible SALVATORE, metatarsal pressure 158 mmHg, second toe pressure 101 mmHg   Vascular surgery consult pending  Hyponatremia  > 4/14: Na 129 - A&O x3, pt mentating appropriately  > 4/15: Na 125 - Discussed with IM who is coordinating with Nephrology; they will manage Na during HD   > Sodium levels continue to fluctuate from 125-131. Pt remains A&O x3. Nephrology will continue to manage electrolyte deficiencies    - Trend on BMP  - Electrolyte management per nephrology  - Continue HD in setting of ESRD  ESRD (end stage renal disease) (HCC)  - Continue HD  - Nephrology consulted and following, appreciate their recommendations  >4/22: new outpatient HD center needed close to his home after discharge. CM to follow up. DC pushed  Paroxysmal atrial fibrillation (HCC)  - Currently rate controlled  - Embolic stroke risk reduction with apixaban 5 mg BID    >4/22- Refusing it this morning. Extensive conversation had with patient to discuss the importance of compliance given his high stroke risk.   > 4/24- now compliant with Eliquis  Chronic diastolic (congestive) heart failure (HCC)  Wt Readings from Last 3 Encounters:   04/24/25 94.3 kg (208 lb)   04/11/25 98.5  kg (217 lb 2.5 oz)   03/24/25 107 kg (234 lb 12.6 oz)     - Most recent TTE from 10/9/24 with EF 60-65%, grade 1 diastolic dysfunction  - Volume management with HD per nephrology  - Monitor weights  - Appreciate medicine and nephrology recommendations and management  Primary hypertension  - Nifedipine  - Appreciate IM and nephrology management  Gout  - Allopurinol for flare prophylaxis  Hyperphosphatemia  > 4/15: Phos 4.6  - Management per nephrology  - Continue phosphate binders  Wound of skin  - Has right anterior pre-tibial scab, wash gently daily, leave open to air  - Wound care RN consulted and following  At high risk for skin breakdown  > 4/21- New sacral pressure injury noted by staff. Pictures reviewed on chart. Patient primarily sleeps in recliner (both here and at home). Discussed with therapy and pt will use a cushion that offers sacral relief on recliner. Will continue to monitor  >4/22- Wound care recs:  Cleanse wound with NSS, pat dry. Apply calcium alginate with silver to wound bed and cover with foam. Change every other day and prn. Cushion will not be covered per therapy for home - advised patient to find one off [employer] to continue offloading     - Moisturize skin daily with skin nourishing cream  - Ehob cushion in chair when out of bed.  - Preventative hydraguard to bilateral heels BID and PRN.   - Calazime paste to sacrum/buttocks BID and PRN  - Monitor clinically for breakdown, frequent turns  - Wound care RN consulted and following  At risk for venous thromboembolism (VTE)  - Apixaban, see above  Secondary hyperparathyroidism (HCC)      Subjective   64-year-old male with history of chronic anemia, atrial fibrillation, diabetes, end-stage renal disease on HD presenting on 4/2 with progressive worsening back pain with fall found to have left-sided retroperitoneal hematoma with active extravasation - status post Kcentra and stabilization transfusions. Required multiple IR embolizations. Course  complicated by acute on chronic pain     Chief Complaint: f/u ambulatory dysfunction    Interval: Patient seen and examined. No acute overnight events. Patient did not have a full session of HD yesterday due to generalized pain, but is agreeable to a longer session tomorrow with Nephrology. Patient is doing well this morning and appears more awake. Advised that therapy plans to give IRP to him today and informed him to be careful as he tends to have the lights off. No new labs to review today.    Objective :  Temp:  [97.1 °F (36.2 °C)-98 °F (36.7 °C)] 98 °F (36.7 °C)  HR:  [60-71] 61  BP: (126-160)/(60-84) 160/60  Resp:  [16-18] 17  SpO2:  [99 %-100 %] 100 %  O2 Device: None (Room air)    Functional Update:  Mobility: sup with bed mobility and ambulation with RW (150')  Transfers: supervision with RW  ADLs: Ind eating, Rin grooming/bathing/UBD, modA LBD, max A toileting    Physical Exam  Vitals reviewed.   Constitutional:       General: He is not in acute distress.     Appearance: Normal appearance.   HENT:      Head: Normocephalic and atraumatic.      Right Ear: External ear normal.      Left Ear: External ear normal.      Nose: Nose normal.      Mouth/Throat:      Mouth: Mucous membranes are moist.      Pharynx: Oropharynx is clear.   Eyes:      Conjunctiva/sclera: Conjunctivae normal.   Cardiovascular:      Rate and Rhythm: Normal rate and regular rhythm.      Heart sounds: Normal heart sounds.   Pulmonary:      Effort: Pulmonary effort is normal. No respiratory distress.      Breath sounds: Normal breath sounds. No wheezing.   Abdominal:      General: There is no distension.      Palpations: Abdomen is soft.      Comments: +Hypoactive bowel sounds   Musculoskeletal:         General: Normal range of motion.   Skin:     General: Skin is warm and dry.   Neurological:      Mental Status: He is alert and oriented to person, place, and time. Mental status is at baseline.         Scheduled Meds:  Current  Facility-Administered Medications   Medication Dose Route Frequency Provider Last Rate    acetaminophen  975 mg Oral Q8H [facility] Damian Cerda MD      allopurinol  100 mg Oral Daily Damian Cerda MD      aluminum-magnesium hydroxide-simethicone  30 mL Oral Q4H PRN GRANT Church      apixaban  5 mg Oral BID Damian Cerda MD      atorvastatin  80 mg Oral Daily With Dinner Damian Cerda MD      bisacodyl  10 mg Rectal Daily PRN Michelle Landa DO      calcium carbonate  500 mg Oral Daily PRN GRANT Church      docusate sodium  100 mg Oral BID Michelle Landa, DO      epoetin jessica  8,000 Units Intravenous After Dialysis German Linton MD      gabapentin  100 mg Oral Once per day on Monday Wednesday Friday Damian Cerda MD      lidocaine  1 patch Topical Daily PRN Damian Cerda MD      melatonin  6 mg Oral HS Jayden Rowland MD      methocarbamol  1,000 mg Oral Q8H [facility] Jayden Rowland MD      naloxone  0.04 mg Intravenous Q1MIN PRN Damian Cerda MD      NIFEdipine  30 mg Oral After Dinner Damian Cerda MD      ondansetron  4 mg Oral Q6H PRN Gino Austin PA-C      oxyCODONE  5 mg Oral Q4H PRN Damian Cerda MD      Or    oxyCODONE  10 mg Oral Q4H PRN Damian Cerda MD      oxyCODONE  2.5 mg Oral Q8H PRN Jayden Rowland MD      polyethylene glycol  17 g Oral Daily PRN Michelle Landa,       senna  2 tablet Oral Daily With Lunch Michelle Landa DO      sevelamer  1,600 mg Oral TID With Meals Damian Cerda MD      white petrolatum-mineral oil   Topical TID PRN Michelle Landa DO           Lab Results: I have reviewed the following results:  Results from last 7 days   Lab Units 04/22/25  1237 04/19/25  0818   HEMOGLOBIN g/dL 8.4* 8.1*   HEMATOCRIT % 26.4* 26.1*   WBC Thousand/uL 7.27 10.34*   PLATELETS Thousands/uL 327 361     Results from last 7 days   Lab Units 04/22/25  1237 04/19/25  0818   BUN mg/dL 48* 37*   SODIUM mmol/L 129* 129*   POTASSIUM mmol/L 4.3 4.3   CHLORIDE mmol/L 89* 95*    CREATININE mg/dL 7.18* 6.06*

## 2025-04-25 NOTE — PROGRESS NOTES
"OT daily tx note     04/25/25 0900   Pain Assessment   Pain Assessment Tool 0-10   Pain Score 9   Pain Location/Orientation Location: Back   Hospital Pain Intervention(s) Rest;Emotional support   Restrictions/Precautions   Precautions Fall Risk;Fluid restriction;Pain;Visual deficit;Supervision on toilet/commode;Spinal precautions   ROM Restrictions Yes  (spinal prec)   Braces or Orthoses LSO   Lifestyle   Autonomy \"I can't leave yet\"   Eating   Type of Assistance Needed Independent   Physical Assistance Level No physical assistance   Comment seated in recliner   Eating CARE Score 6   Oral Hygiene   Type of Assistance Needed Set-up / clean-up   Physical Assistance Level No physical assistance   Comment in stance at sink   Oral Hygiene CARE Score 5   Shower/Bathe Self   Type of Assistance Needed Physical assistance   Physical Assistance Level 25% or less   Comment DS for SB seated in recliner and able to wash 8/10 body parts w/ min A needed for B feet. pt refusing to utilize LHS and overall very frustrated when asked to wash up. pt only using wipes for bathing   Shower/Bathe Self CARE Score 3   Upper Body Dressing   Type of Assistance Needed Independent   Physical Assistance Level No physical assistance   Comment seated and ind for LSO brace mgmt   Upper Body Dressing CARE Score 6   Lower Body Dressing   Type of Assistance Needed Incidental touching   Physical Assistance Level No physical assistance   Comment CS/VC req and refusing A from OT but able to manage pants w/ LHR and several prompts; pt very frustrated and reports no problem when at home and OT explaining he now has spinal prec he needs to be mindful of   Lower Body Dressing CARE Score 4   Putting On/Taking Off Footwear   Type of Assistance Needed Physical assistance   Physical Assistance Level 51%-75%   Comment seated and TA for BLE ace wraps but removed as they were left on overnight; refused sock aid use and reports propping legs on bed for sock mgmt but " refusing during session   Putting On/Taking Off Footwear CARE Score 2   Sit to Stand   Type of Assistance Needed Set-up / clean-up   Physical Assistance Level No physical assistance   Comment w/ RW   Sit to Stand CARE Score 5   Bed-Chair Transfer   Type of Assistance Needed Set-up / clean-up   Physical Assistance Level No physical assistance   Comment w/ RW   Chair/Bed-to-Chair Transfer CARE Score 5   Toileting Hygiene   Type of Assistance Needed Supervision   Physical Assistance Level No physical assistance   Comment DS for cm/hygiene   Toileting Hygiene CARE Score 4   Toilet Transfer   Type of Assistance Needed Set-up / clean-up   Physical Assistance Level No physical assistance   Comment w/ RW to BR   Toilet Transfer CARE Score 5   Cognition   Overall Cognitive Status Impaired   Arousal/Participation Cooperative;Responsive   Attention Attends with cues to redirect   Orientation Level Oriented X4   Memory Decreased recall of precautions   Following Commands Follows one step commands without difficulty   Activity Tolerance   Activity Tolerance Patient limited by fatigue   Assessment   Treatment Assessment Pt engaged in skilled OT session with focus on ADL Retraining , LB Dressing, UB dressing,  LHAE education/training, Functional Transfers, Standing tolerance, Standing balance , Energy conservation training/education, healthy coping education, Leisure and social pursuits, and community re-integration. Pt is limited by weakness, impaired balance, decreased endurance, increased fall risk, decreased ADLS, decreased IADLS, pain, decreased activity tolerance, and decreased cognition. ADL routine completed during session but opt very frustrated and not wishing to participate. Only LHR used for cm but refused use of other equipment and/or modifying approach. Pt scored according to refusing to participate despite explanation as to why we need to see. NSG notified of DS for toileting and will look ot progress to IRP over  the weekend in prep for DC next week. Pt cont to state he is not ready to leave yet but cannot articulate reason as to why. Pt agitated this AM and tried to gently approach pt about home, DC, what else needs to be done in therapy before leaving but pt not cooperative.   [employer] handouts left in pt's room about purchase of LHAE, wide sock aid, universal walker tray, and 3x daily pill box for rec for home. Pt aware of folder left in room. [employer] was reviewed again during session for carryover and signs left on tray table w/ info about kaya, bank/[employer] phone #. In upcoming sessions cont to work on UB strengthening, low vision aids, practice using [employer], compensation for ADLs (LB dressing), DC discussion when appropriate. OT services are warranted to address above barriers.   Prognosis Good   Problem List Decreased strength;Decreased endurance;Impaired balance;Decreased mobility;Orthopedic restrictions;Pain;Impaired vision   Barriers to Discharge Inaccessible home environment;Decreased caregiver support   Plan   Treatment/Interventions ADL retraining;Functional transfer training;Therapeutic exercise;Endurance training;Cognitive reorientation   Progress Progressing toward goals   OT Therapy Minutes   OT Time In 0900   OT Time Out 1030   OT Total Time (minutes) 90   OT Mode of treatment - Individual (minutes) 90   OT Mode of treatment - Concurrent (minutes) 0   OT Mode of treatment - Group (minutes) 0   OT Mode of treatment - Co-treat (minutes) 0   OT Mode of Treatment - Total time(minutes) 90 minutes   OT Cumulative Minutes 980   Therapy Time missed   Time missed? No

## 2025-04-25 NOTE — ASSESSMENT & PLAN NOTE
#ESRD on HD TTS:  Dialysis unit/days: [facility]  Access: PermCath  Patient had dialysis yesterday, terminated early due to generalized pain   Advised patient to complete treatment to avoid fluid overload  Plan for next dialysis tomorrow  Fluid restriction 1.8 L a day  Adjust medications to GFR<10  Avoid opioids

## 2025-04-25 NOTE — CASE MANAGEMENT
Received call from Tammy from [facility]. She received documents and confirmed hep b panel was drawn. Cm received results and faxed to tammy. Tammy stated the only current open times are 5am for dialysis at the [place] location. Gabe also confirmed address pt will be going to.  Gabe is now realizing the closest dialysis location to pt is in [facility] but his address is [facility].  Gabe phoned the  at [facility] [place], [phone] to inquire [place]. Awaiting call back.

## 2025-04-25 NOTE — ASSESSMENT & PLAN NOTE
4/15- Adjusted bowel medications: Colace 100mg BID, Senna 17.2mg with lunch and PRN Miralax/Suppos  4/17- No BM since 4/13. Lactulose this evening if no BM after lunch. Can consider Relistor if no success for opioid-inducted constipation  4/18- small hard BMs x2 yesterday after lactulose and suppository. Continue bowel regimen  >BM 4/22 but required Relistor and suppository/lactulose  > Last BM 4/25    - Will follow BMs and adjust bowel regimen to target soft, formed stools q1-2 days, per pt's regular schedule.

## 2025-04-25 NOTE — PLAN OF CARE
Problem: PAIN - ADULT  Goal: Verbalizes/displays adequate comfort level or baseline comfort level  Description: Interventions:- Encourage patient to monitor pain and request assistance- Assess pain using appropriate pain scale- Administer analgesics based on type and severity of pain and evaluate response- Implement non-pharmacological measures as appropriate and evaluate response- Consider cultural and social influences on pain and pain management- Notify physician/advanced practitioner if interventions unsuccessful or patient reports new pain  Outcome: Progressing     Problem: INFECTION - ADULT  Goal: Absence or prevention of progression during hospitalization  Description: INTERVENTIONS:- Assess and monitor for signs and symptoms of infection- Monitor lab/diagnostic results- Monitor all insertion sites, i.e. indwelling lines, tubes, and drains- Monitor endotracheal if appropriate and nasal secretions for changes in amount and color- [place] appropriate cooling/warming therapies per order- Administer medications as ordered- Instruct and encourage patient and family to use good hand hygiene technique- Identify and instruct in appropriate isolation precautions for identified infection/condition  Outcome: Progressing  Goal: Absence of fever/infection during neutropenic period  Description: INTERVENTIONS:- Monitor WBC  Outcome: Progressing     Problem: SAFETY ADULT  Goal: Patient will remain free of falls  Description: INTERVENTIONS:- Educate patient/family on patient safety including physical limitations- Instruct patient to call for assistance with activity - Consult OT/PT to assist with strengthening/mobility - Keep Call bell within reach- Keep bed low and locked with side rails adjusted as appropriate- Keep care items and personal belongings within reach- Initiate and maintain comfort rounds- Make Fall Risk Sign visible to staff- Offer Toileting every 2 Hours, in advance of need- Initiate/Maintain alarm-  Obtain necessary fall risk management equipment: - Apply yellow socks and bracelet for high fall risk patients- Consider moving patient to room near nurses station  Outcome: Progressing  Goal: Maintain or return to baseline ADL function  Description: INTERVENTIONS:-  Assess patient's ability to carry out ADLs; assess patient's baseline for ADL function and identify physical deficits which impact ability to perform ADLs (bathing, care of mouth/teeth, toileting, grooming, dressing, etc.)- Assess/evaluate cause of self-care deficits - Assess range of motion- Assess patient's mobility; develop plan if impaired- Assess patient's need for assistive devices and provide as appropriate- Encourage maximum independence but intervene and supervise when necessary- Involve family in performance of ADLs- Assess for home care needs following discharge - Consider OT consult to assist with ADL evaluation and planning for discharge- Provide patient education as appropriate  Outcome: Progressing  Goal: Maintains/Returns to pre admission functional level  Description: INTERVENTIONS:- Perform AM-PAC 6 Click Basic Mobility/ Daily Activity assessment daily.- Set and communicate daily mobility goal to care team and patient/family/caregiver. - Collaborate with rehabilitation services on mobility goals if consulted- Perform Range of Motion 3 times a day.- Reposition patient every 2 hours.- Dangle patient 3 times a day- Stand patient 3 times a day- Ambulate patient 3 times a day- Out of bed to chair 3 times a day - Out of bed for meals 3 times a day- Out of bed for toileting- Record patient progress and toleration of activity level   Outcome: Progressing     Problem: DISCHARGE PLANNING  Goal: Discharge to home or other facility with appropriate resources  Description: INTERVENTIONS:- Identify barriers to discharge w/patient and caregiver- Arrange for needed discharge resources and transportation as appropriate- Identify discharge learning  needs (meds, wound care, etc.)- Arrange for interpretive services to assist at discharge as needed- Refer to Case Management Department for coordinating discharge planning if the patient needs post-hospital services based on physician/advanced practitioner order or complex needs related to functional status, cognitive ability, or social support system  Outcome: Progressing     Problem: Prexisting or High Potential for Compromised Skin Integrity  Goal: Skin integrity is maintained or improved  Description: INTERVENTIONS:- Identify patients at risk for skin breakdown- Assess and monitor skin integrity- Assess and monitor nutrition and hydration status- Monitor labs - Assess for incontinence - Turn and reposition patient- Assist with mobility/ambulation- Relieve pressure over bony prominences- Avoid friction and shearing- Provide appropriate hygiene as needed including keeping skin clean and dry- Evaluate need for skin moisturizer/barrier cream- Collaborate with interdisciplinary team - Patient/family teaching- Consider wound care consult   Outcome: Progressing     Problem: METABOLIC, FLUID AND ELECTROLYTES - ADULT  Goal: Electrolytes maintained within normal limits  Description: INTERVENTIONS:- Monitor labs and assess patient for signs and symptoms of electrolyte imbalances- Administer electrolyte replacement as ordered- Monitor response to electrolyte replacements, including repeat lab results as appropriate- Instruct patient on fluid and nutrition as appropriate  Outcome: Progressing  Goal: Fluid balance maintained  Description: INTERVENTIONS:- Monitor labs - Monitor I/O and WT- Instruct patient on fluid and nutrition as appropriate- Assess for signs & symptoms of volume excess or deficit  Outcome: Progressing     Problem: Nutrition/Hydration-ADULT  Goal: Nutrient/Hydration intake appropriate for improving, restoring or maintaining nutritional needs  Description: Monitor and assess patient's nutrition/hydration  status for malnutrition. Collaborate with interdisciplinary team and initiate plan and interventions as ordered.  Monitor patient's weight and dietary intake as ordered or per policy. Utilize nutrition screening tool and intervene as necessary. Determine patient's food preferences and provide high-protein, high-caloric foods as appropriate. INTERVENTIONS:- Monitor oral intake, urinary output, labs, and treatment plans- Assess nutrition and hydration status and recommend course of action- Evaluate amount of meals eaten- Assist patient with eating if necessary - Allow adequate time for meals- Recommend/ encourage appropriate diets, oral nutritional supplements, and vitamin/mineral supplements- Order, calculate, and assess calorie counts as needed- Recommend, monitor, and adjust tube feedings and TPN/PPN based on assessed needs- Assess need for intravenous fluids- Provide specific nutrition/hydration education as appropriate- Include patient/family/caregiver in decisions related to nutrition  Outcome: Progressing     Problem: Nutrition/Hydration-ADULT  Goal: Nutrient/Hydration intake appropriate for improving, restoring or maintaining nutritional needs  Description: Monitor and assess patient's nutrition/hydration status for malnutrition. Collaborate with interdisciplinary team and initiate plan and interventions as ordered.  Monitor patient's weight and dietary intake as ordered or per policy. Utilize nutrition screening tool and intervene as necessary. Determine patient's food preferences and provide high-protein, high-caloric foods as appropriate. INTERVENTIONS:- Monitor oral intake, urinary output, labs, and treatment plans- Assess nutrition and hydration status and recommend course of action- Evaluate amount of meals eaten- Assist patient with eating if necessary - Allow adequate time for meals- Recommend/ encourage appropriate diets, oral nutritional supplements, and vitamin/mineral supplements- Order, calculate,  and assess calorie counts as needed- Recommend, monitor, and adjust tube feedings and TPN/PPN based on assessed needs- Assess need for intravenous fluids- Provide specific nutrition/hydration education as appropriate- Include patient/family/caregiver in decisions related to nutrition  Outcome: Progressing

## 2025-04-25 NOTE — PROGRESS NOTES
04/25/25 1245   Pain Assessment   Pain Assessment Tool 0-10   Pain Score 5   Pain Location/Orientation Orientation: Lower;Location: Back   Pain Onset/Description Onset: Ongoing;Frequency: Intermittent;Descriptor: Sore   Patient's Stated Pain Goal No pain   Hospital Pain Intervention(s) Rest   Restrictions/Precautions   Precautions Fall Risk;Fluid restriction;Pain;Spinal precautions;Supervision on toilet/commode;Visual deficit   ROM Restrictions   (spinal precautions)   Braces or Orthoses LSO   Cognition   Overall Cognitive Status Impaired   Arousal/Participation Cooperative   Attention Attends with cues to redirect   Orientation Level Oriented X4   Memory Decreased recall of precautions   Following Commands Follows one step commands without difficulty   Sit to Stand   Type of Assistance Needed Set-up / clean-up;Adaptive equipment   Comment DS with RW   Sit to Stand CARE Score 5   Bed-Chair Transfer   Type of Assistance Needed Set-up / clean-up;Adaptive equipment   Comment DS with RW   Chair/Bed-to-Chair Transfer CARE Score 5   Transfer Bed/Chair/Wheelchair   Adaptive Equipment Roller Walker   Walk 10 Feet   Type of Assistance Needed Set-up / clean-up;Adaptive equipment   Comment DS with RW   Walk 10 Feet CARE Score 5   Walk 50 Feet with Two Turns   Type of Assistance Needed Set-up / clean-up;Adaptive equipment   Comment DS with RW   Walk 50 Feet with Two Turns CARE Score 5   Walk 150 Feet   Type of Assistance Needed Set-up / clean-up;Adaptive equipment   Comment DS with RW   Walk 150 Feet CARE Score 5   Ambulation   Primary Mode of Locomotion Prior to Admission Walk   Distance Walked (feet) 150 ft  (466')   Assist Device Roller Walker   Gait Pattern Inconsistant Lisa;Slow Lisa;Decreased foot clearance;Forward Flexion;Wide GARO;Improper weight shift;Shuffle   Limitations Noted In Heel Strike;Posture;Speed   Provided Assistance with: Direction   Walk Assist Level Distant Supervision   Does the patient walk? 2.  Yes   Wheelchair mobility   Does the patient use a wheelchair? 0. No   Picking Up Object   Comment please complete next session   Therapeutic Interventions   Strengthening BLE LAQ, hip flex, ankle DF/PF HS curls, hip ADD and ABD with ball and green tband. 2 x15 reps BLE with 1.5#   Flexibility B HC/HS stretch seated   Equipment Use   NuStep declined   Assessment   Treatment Assessment Pt agreeable to start session early 2/2 change in schedule. He was in pain upon entering room and the nurse was present offering him some of his medications. Pt was able to amb with DS throughout session. He as able to correct his LSO several times by himself in standing with no LOB seen. Pt cont to be limited by ongoing pain which causes self limitation. Goal is for IRP's Sunday 4/27 in preporation of potential discharge Monday 4/28. Cont POC as tolerated with cont focus on gait, stair management, functional transfers and safety.   Problem List Decreased strength;Decreased endurance;Impaired balance;Decreased mobility;Orthopedic restrictions;Pain;Impaired vision   Barriers to Discharge Inaccessible home environment;Decreased caregiver support   PT Barriers   Functional Limitation Car transfers;Stair negotiation;Standing;Transfers;Walking   Plan   Treatment/Interventions Functional transfer training;LE strengthening/ROM;Therapeutic exercise;Endurance training;Gait training   Progress Progressing toward goals   Discharge Recommendation   Equipment Recommended Walker   PT Therapy Minutes   PT Time In 1245   PT Time Out 1345   PT Total Time (minutes) 60   PT Mode of treatment - Individual (minutes) 60   PT Mode of treatment - Concurrent (minutes) 0   PT Mode of treatment - Group (minutes) 0   PT Mode of treatment - Co-treat (minutes) 0   PT Mode of Treatment - Total time(minutes) 60 minutes   PT Cumulative Minutes 1020   Therapy Time missed   Time missed? No

## 2025-04-25 NOTE — ASSESSMENT & PLAN NOTE
Continue local care  Being followed by WC  Last LEADS was 3/28/25 = 50-75% right prox polital and prox tibioperoneal trunk; >75% stenosis in prox popliteal artery on left  Was seen by VS in 12/2024 hospital stay and he declined any intervention at the time  Podiatry saw here on 4/22/25, s/p debridement = no surgical intervention needed at this time  See below

## 2025-04-25 NOTE — PROGRESS NOTES
"   04/25/25 1100   Pain Assessment   Pain Assessment Tool 0-10   Pain Score No Pain   Pain Location/Orientation Orientation: Lower;Location: Back  (\"No pain, just my normal discomfort\")   Restrictions/Precautions   Precautions Fall Risk;Fluid restriction;Pain;Spinal precautions;Supervision on toilet/commode;Visual deficit   ROM Restrictions Yes  (spinal precautions)   Braces or Orthoses LSO   General   Change In Medical/Functional Status Pt is D/S in room as of today, he is aware that he still should be ringing for S when needing to use bathroom.   Cognition   Arousal/Participation Cooperative   Subjective   Subjective \"Im doing actually alright today how are you?\"   Sit to Lying   Type of Assistance Needed Set-up / clean-up   Comment recliner   Sit to Lying CARE Score 5   Lying to Sitting on Side of Bed   Type of Assistance Needed Set-up / clean-up   Comment recliner   Lying to Sitting on Side of Bed CARE Score 5   Sit to Stand   Type of Assistance Needed Set-up / clean-up;Adaptive equipment   Comment RW   Sit to Stand CARE Score 5   Bed-Chair Transfer   Type of Assistance Needed Set-up / clean-up;Adaptive equipment   Comment RW   Chair/Bed-to-Chair Transfer CARE Score 5   Transfer Bed/Chair/Wheelchair   Limitations Noted In Balance;Confidence;Endurance;Problem Solving;LE Strength;Vision   Adaptive Equipment Roller Walker   Walk 10 Feet   Type of Assistance Needed Set-up / clean-up;Adaptive equipment   Comment RW   Walk 10 Feet CARE Score 5   Walk 50 Feet with Two Turns   Type of Assistance Needed Set-up / clean-up;Adaptive equipment   Comment RW   Walk 50 Feet with Two Turns CARE Score 5   Walk 150 Feet   Type of Assistance Needed Set-up / clean-up;Adaptive equipment   Comment RW   Walk 150 Feet CARE Score 5   Ambulation   Primary Mode of Locomotion Prior to Admission Walk   Distance Walked (feet) 482 ft   Assist Device Roller Walker   Gait Pattern Inconsistant Lisa;Slow Lisa;Decreased foot clearance;Forward " "Flexion;Wide GARO;Step to;Step through;Improper weight shift   Limitations Noted In Balance;Endurance;Heel Strike;Speed;Strength   Walk Assist Level Distant Supervision   Does the patient walk? 2. Yes   Curb or Single Stair   Style negotiated Single stair  (6\")   Type of Assistance Needed Supervision   1 Step (Curb) CARE Score 4   4 Steps   Type of Assistance Needed Supervision   4 Steps CARE Score 4   12 Steps   Reason if not Attempted Activity not applicable   12 Steps CARE Score 9   Stairs   Type Stairs  ( steps)   # of Steps 8  (x1, 4x1)   Weight Bearing Precautions Fall Risk   Assist Devices Bilateral Rail   Findings 6\"  steps with BUE support on BHR, non reciprocal pattern   Picking Up Object   Comment (S)  please complete next session   Therapeutic Interventions   Other discussing and practicing in room mobility with set up and RW from recliner<>bathroom   Assessment   Treatment Assessment Pt participated in 30 min skilled PT session with focus on in room mobility. Pt has a relatively simple room set up with chair, tray table and RW all manageable from recliner. Discussed with pt that he is D/S today and tomorrow so he still needs to ring for bathroom yes and he verbalized understanding. DIscussed purpose of this progression so that Sunday-Monday he can be mod I in room with RW for IRP. Pt expressed understanding and said physically he is feeling good, its the other things holding him back. Continue per PT POC to improve LE strength, endurance and balance, progress pt over weekend to mod I w/ RW IRP on Sunday and work on car transfers, uneven surfaces and picking up an object to obtain all mobility levels.   Problem List Decreased strength;Decreased endurance;Impaired balance;Decreased mobility;Orthopedic restrictions;Pain;Impaired vision   Barriers to Discharge Inaccessible home environment;Decreased caregiver support   PT Barriers   Physical Impairment Decreased strength;Decreased range of " motion;Decreased endurance;Impaired balance;Decreased mobility;Impaired vision   Functional Limitation Car transfers;Stair negotiation;Standing;Transfers;Walking   PT Therapy Minutes   PT Time In 1100   PT Time Out 1130   PT Total Time (minutes) 30   PT Mode of treatment - Individual (minutes) 30   PT Mode of treatment - Concurrent (minutes) 0   PT Mode of treatment - Group (minutes) 0   PT Mode of treatment - Co-treat (minutes) 0   PT Mode of Treatment - Total time(minutes) 30 minutes   PT Cumulative Minutes 960   Therapy Time missed   Time missed? No

## 2025-04-25 NOTE — ASSESSMENT & PLAN NOTE
> 4/14: Hgb 7.6  > 4/15: Hgb 7.2 - Discussed with IM who is coordinating with Nephrology; they will increase his Epoetin jessica  > 4/17: Hgb 7.7  > 4/22: Hgb 8.4    - Trend Hgb on CBC  - Epoetin jessica with dialysis  - Transfusions per IM, appreciate their recommendations

## 2025-04-25 NOTE — ASSESSMENT & PLAN NOTE
"- Reporting \"floater\" within the right eye with acute onset on 4/11/25 at mid-day while watching TV; describes a dark, \"spider\"-shaped floater that occupies a significant portion of the visual field, painless; has similar floaters in the left eye but smaller  - Ophthalmology consulted on [facility] admission by IM, per prior trauma surgery documentation, Dr. 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 Pt will follow up with ophtho immediately after discharge and for referral to a retina specialist  > 4/15- Pt thinks his R eye floater may be larger today, unable to clearly state or quantify symptoms. No other visual complaints of increased number of floaters, double/blurry vision, or bright flashes. Advised patient to inform us if symptoms change - will reach out to Ophthalmology accordingly  > 4/18- No changes noted in vision/floaters. His overall impaired vision continues to be a barrier for his ADLs/iADLs  > 4/20-24- Stable R eye floater    - Monitor for changes in vision  "

## 2025-04-26 ENCOUNTER — APPOINTMENT (INPATIENT)
Dept: DIALYSIS | Facility: HOSPITAL | Age: 65
DRG: 939 | End: 2025-04-26
Payer: MEDICARE

## 2025-04-26 PROCEDURE — 90935 HEMODIALYSIS ONE EVALUATION: CPT | Performed by: INTERNAL MEDICINE

## 2025-04-26 PROCEDURE — 99232 SBSQ HOSP IP/OBS MODERATE 35: CPT | Performed by: STUDENT IN AN ORGANIZED HEALTH CARE EDUCATION/TRAINING PROGRAM

## 2025-04-26 PROCEDURE — 99232 SBSQ HOSP IP/OBS MODERATE 35: CPT | Performed by: PHYSICIAN ASSISTANT

## 2025-04-26 RX ADMIN — DOCUSATE SODIUM 100 MG: 100 CAPSULE, LIQUID FILLED ORAL at 17:20

## 2025-04-26 RX ADMIN — SENNOSIDES 17.2 MG: 8.6 TABLET, FILM COATED ORAL at 12:59

## 2025-04-26 RX ADMIN — ACETAMINOPHEN 975 MG: 325 TABLET, FILM COATED ORAL at 05:36

## 2025-04-26 RX ADMIN — METHOCARBAMOL 1000 MG: 500 TABLET ORAL at 05:36

## 2025-04-26 RX ADMIN — SEVELAMER HYDROCHLORIDE 1600 MG: 800 TABLET ORAL at 12:58

## 2025-04-26 RX ADMIN — APIXABAN 5 MG: 5 TABLET, FILM COATED ORAL at 17:20

## 2025-04-26 RX ADMIN — NIFEDIPINE 30 MG: 30 TABLET, FILM COATED, EXTENDED RELEASE ORAL at 17:20

## 2025-04-26 RX ADMIN — ACETAMINOPHEN 975 MG: 325 TABLET, FILM COATED ORAL at 21:21

## 2025-04-26 RX ADMIN — DOCUSATE SODIUM 100 MG: 100 CAPSULE, LIQUID FILLED ORAL at 12:59

## 2025-04-26 RX ADMIN — EPOETIN ALFA 8000 UNITS: 4000 SOLUTION INTRAVENOUS; SUBCUTANEOUS at 08:50

## 2025-04-26 RX ADMIN — ATORVASTATIN CALCIUM 80 MG: 80 TABLET, FILM COATED ORAL at 17:20

## 2025-04-26 RX ADMIN — OXYCODONE HYDROCHLORIDE 10 MG: 10 TABLET ORAL at 19:20

## 2025-04-26 RX ADMIN — ACETAMINOPHEN 975 MG: 325 TABLET, FILM COATED ORAL at 14:49

## 2025-04-26 RX ADMIN — POLYETHYLENE GLYCOL 3350 17 G: 17 POWDER, FOR SOLUTION ORAL at 12:59

## 2025-04-26 RX ADMIN — METHOCARBAMOL 1000 MG: 500 TABLET ORAL at 21:22

## 2025-04-26 RX ADMIN — CALCIUM CARBONATE (ANTACID) CHEW TAB 500 MG 500 MG: 500 CHEW TAB at 21:24

## 2025-04-26 RX ADMIN — METHOCARBAMOL 1000 MG: 500 TABLET ORAL at 14:49

## 2025-04-26 RX ADMIN — ALLOPURINOL 100 MG: 100 TABLET ORAL at 12:59

## 2025-04-26 RX ADMIN — APIXABAN 5 MG: 5 TABLET, FILM COATED ORAL at 12:59

## 2025-04-26 RX ADMIN — SEVELAMER HYDROCHLORIDE 1600 MG: 800 TABLET ORAL at 17:20

## 2025-04-26 RX ADMIN — OXYCODONE HYDROCHLORIDE 10 MG: 10 TABLET ORAL at 09:57

## 2025-04-26 RX ADMIN — OXYCODONE HYDROCHLORIDE 10 MG: 10 TABLET ORAL at 23:29

## 2025-04-26 RX ADMIN — OXYCODONE HYDROCHLORIDE 10 MG: 10 TABLET ORAL at 05:36

## 2025-04-26 RX ADMIN — Medication 6 MG: at 21:21

## 2025-04-26 NOTE — ASSESSMENT & PLAN NOTE
#ESRD on HD TTS:  Dialysis unit/days: [facility]  Access: PermCath  Patient had dialysis 4/24, terminated early due to generalized pain   Advised patient to complete treatment to avoid fluid overload  Patient seen and examined this morning during dialysis treatment, blood pressure decreased, UF was adjusted appropriately, patient is planning to stay as much as possible, blood pressure is stable, PermCath accessed with good blood flow

## 2025-04-26 NOTE — ASSESSMENT & PLAN NOTE
Current hemoglobin: 8.4 mg/dL on 4/22  Treatments :  Outpatient Epogen  Transfuse for hemoglobin less than 7.0 per primary service

## 2025-04-26 NOTE — PROGRESS NOTES
Progress Note - Nephrology   Name: Naresh Carpio 64 y.o. male I MRN: [id number]  Unit/Bed#: -01 I Date of Admission: 4/11/2025   Date of Service: 4/26/2025 I Hospital Day: 15    Assessment & Plan  ESRD (end stage renal disease) ([facility])  #ESRD on HD TTS:  Dialysis unit/days: [facility]  Access: PermCath  Patient had dialysis 4/24, terminated early due to generalized pain   Advised patient to complete treatment to avoid fluid overload  Patient seen and examined this morning during dialysis treatment, blood pressure decreased, UF was adjusted appropriately, patient is planning to stay as much as possible, blood pressure is stable, PermCath accessed with good blood flow    Primary hypertension  Volume: Hypervolemic  Blood pressure: Hypertensive, /55  UF on HD keep him at his dry weight  Low-sodium diet   Nifedipine 30 mg daily   Anemia due to chronic kidney disease, on chronic dialysis ([facility])  Current hemoglobin: 8.4 mg/dL on 4/22  Treatments :  Outpatient Epogen  Transfuse for hemoglobin less than 7.0 per primary service      Traumatic retroperitoneal hematoma  S/p IR embolization  Resolved  Hemoglobin low but is stable  Secondary hyperparathyroidism ([facility])  Sevelamer with meals 3 times daily     PAD (peripheral artery disease) ([facility])      I have reviewed the nephrology recommendations including HD today, UF to keeping his dry weight, with primary team, and we are in agreement with renal plan including the information outlined above.     Subjective   Brief History of Admission -     Seen and examined during dialysis treatment, the pressure is stable though decreasing during dialysis, UF was decreased appropriately, PermCath accessed with blood flow.  Patient complaining of lower back pain, no chest pain or shortness of breath    Objective :  Temp:  [97.5 °F (36.4 °C)-98 °F (36.7 °C)] 97.5 °F (36.4 °C)  HR:  [58-68] 62  BP: ()/(51-85) 107/56  Resp:  [16-18] 16  SpO2:  [97 %-99 %] 99 %  O2 Device: None (Room  air)    Current Weight: Weight - Scale: 94.3 kg (208 lb)  First Weight: Weight - Scale: 98.9 kg (218 lb)  I/O         04/24 0701 04/25 0700 04/25 0701 04/26 0700 04/26 0701 04/27 0700    P.O. 120 480     I.V. (mL/kg) 500 (5.3)  200 (2.1)    Total Intake(mL/kg) 620 (6.6) 480 (5.1) 200 (2.1)    Urine (mL/kg/hr) 150 (0.1)      Other 1552      Stool 0      Total Output 1702      Net -1082 +480 +200           Unmeasured Stool Occurrence 1 x            Physical Exam  General: conscious, cooperative, in not acute distress  Eyes: conjunctivae pale, anicteric sclerae  ENT: lips and mucous membranes moist  Neck: supple, no JVD  Chest: clear breath sounds bilateral, no crackles, ronchus or wheezings  CVS: distinct S1 & S2, normal rate, regular rhythm  Abdomen: soft, non-tender, non-distended, normoactive bowel sounds  Extremities: no edema of both legs  Skin: no rash  Neuro: awake, alert, oriented  PermCath accessed with good blood flow    Medications:    Current Facility-Administered Medications:     acetaminophen (TYLENOL) tablet 975 mg, 975 mg, Oral, Q8H KEMAL, Damian Cerda MD, 975 mg at 04/26/25 0536    allopurinol (ZYLOPRIM) tablet 100 mg, 100 mg, Oral, Daily, Damian Cerda MD, 100 mg at 04/25/25 0832    aluminum-magnesium hydroxide-simethicone (MAALOX) oral suspension 30 mL, 30 mL, Oral, Q4H PRN, GRANT Church, 30 mL at 04/24/25 1351    apixaban (ELIQUIS) tablet 5 mg, 5 mg, Oral, BID, Damian Cerda MD, 5 mg at 04/25/25 1747    atorvastatin (LIPITOR) tablet 80 mg, 80 mg, Oral, Daily With Dinner, Damian Cerda MD, 80 mg at 04/25/25 1747    bisacodyl (DULCOLAX) rectal suppository 10 mg, 10 mg, Rectal, Daily PRN, Michelle Landa DO, 10 mg at 04/18/25 0000    calcium carbonate (TUMS) chewable tablet 500 mg, 500 mg, Oral, Daily PRN, GRANT Church, 500 mg at 04/24/25 1131    docusate sodium (COLACE) capsule 100 mg, 100 mg, Oral, BID, Michelle LAMONTE Landa DO, 100 mg at 04/25/25 1747    epoetin jessica (EPOGEN,PROCRIT)  injection 8,000 Units, 8,000 Units, Intravenous, After Dialysis, German Linton MD, 8,000 Units at 04/26/25 0850    gabapentin (NEURONTIN) capsule 100 mg, 100 mg, Oral, Once per day on Monday Wednesday Friday, Damian Cerda MD, 100 mg at 04/25/25 0832    lidocaine (LIDODERM) 5 % patch 1 patch, 1 patch, Topical, Daily PRN, Damian Cerda MD, 1 patch at 04/18/25 2135    melatonin tablet 6 mg, 6 mg, Oral, HS, Jayden Rowland MD, 6 mg at 04/25/25 2233    methocarbamol (ROBAXIN) tablet 1,000 mg, 1,000 mg, Oral, Q8H KEMAL, Jayden Rowland MD, 1,000 mg at 04/26/25 0536    naloxone (NARCAN) 0.04 mg/mL syringe 0.04 mg, 0.04 mg, Intravenous, Q1MIN PRN, Damian Cerda MD    NIFEdipine (PROCARDIA XL) 24 hr tablet 30 mg, 30 mg, Oral, After Dinner, Damian Cerda MD, 30 mg at 04/25/25 1747    ondansetron (ZOFRAN-ODT) dispersible tablet 4 mg, 4 mg, Oral, Q6H PRN, Gino Austin PA-C, 4 mg at 04/20/25 0023    oxyCODONE (ROXICODONE) IR tablet 5 mg, 5 mg, Oral, Q4H PRN, 5 mg at 04/22/25 2006 **OR** oxyCODONE (ROXICODONE) immediate release tablet 10 mg, 10 mg, Oral, Q4H PRN, Damian Cerda MD, 10 mg at 04/26/25 0957    oxyCODONE (ROXICODONE) split tablet 2.5 mg, 2.5 mg, Oral, Q8H PRN, Jayden Rowland MD, 2.5 mg at 04/25/25 0034    polyethylene glycol (MIRALAX) packet 17 g, 17 g, Oral, Daily PRN, Michelle Landa DO, 17 g at 04/17/25 0845    senna (SENOKOT) tablet 17.2 mg, 2 tablet, Oral, Daily With Lunch, Michelle Landa DO, 17.2 mg at 04/24/25 1131    sevelamer (RENAGEL) tablet 1,600 mg, 1,600 mg, Oral, TID With Meals, Damian Cerda MD, 1,600 mg at 04/25/25 2509    white petrolatum-mineral oil (EUCERIN,HYDROCERIN) cream, , Topical, TID PRN, Michelle Landa DO      Lab Results: I have reviewed the following results:  Results from last 7 days   Lab Units 04/22/25  1237   WBC Thousand/uL 7.27   HEMOGLOBIN g/dL 8.4*   HEMATOCRIT % 26.4*   PLATELETS Thousands/uL 327   POTASSIUM mmol/L 4.3   CHLORIDE mmol/L 89*   CO2 mmol/L 29  "  BUN mg/dL 48*   CREATININE mg/dL 7.18*   CALCIUM mg/dL 8.9       Administrative Statements     Portions of the record may have been created with voice recognition software. Occasional wrong word or \"sound a like\" substitutions may have occurred due to the inherent limitations of voice recognition software. Read the chart carefully and recognize, using context, where substitutions have occurred.If you have any questions, please contact the dictating provider.  "

## 2025-04-26 NOTE — PLAN OF CARE
Problem: SAFETY ADULT  Goal: Patient will remain free of falls  Description: INTERVENTIONS:- Educate patient/family on patient safety including physical limitations- Instruct patient to call for assistance with activity - Consult OT/PT to assist with strengthening/mobility - Keep Call bell within reach- Keep bed low and locked with side rails adjusted as appropriate- Keep care items and personal belongings within reach- Initiate and maintain comfort rounds- Make Fall Risk Sign visible to staff- Offer Toileting every 2 Hours, in advance of need- Initiate/Maintain alarm- Obtain necessary fall risk management equipment: - Apply yellow socks and bracelet for high fall risk patients- Consider moving patient to room near nurses station  Outcome: Progressing

## 2025-04-26 NOTE — ASSESSMENT & PLAN NOTE
"On no rate control medications  Examines RRR  Anticoagulation with Eliquis as at home but he doesn't want to take it because he is worried it will make his right eye vision worse.  I d/w on call Optho Dr Diaz = \"The risk is not quantified in the literature. Dr edmond's note suggests a diagnosis of proliferative diabetic retinopathy and bilateral and recurrent vitreous hemorrhage. So he is at risk for - and has had hemorrhage - whether or not he is on eliquis (assuming dr edmond is correct).     I would always recommend life and death are more important so to do what you recommend re his systemic condition, and then to follow up with dr edmond as he recommended so a real exam can be done in the clinic\".   I explained all this to him and he understands  He has been taking the Eliquis.  "

## 2025-04-26 NOTE — PLAN OF CARE
Problem: PAIN - ADULT  Goal: Verbalizes/displays adequate comfort level or baseline comfort level  Description: Interventions:- Encourage patient to monitor pain and request assistance- Assess pain using appropriate pain scale- Administer analgesics based on type and severity of pain and evaluate response- Implement non-pharmacological measures as appropriate and evaluate response- Consider cultural and social influences on pain and pain management- Notify physician/advanced practitioner if interventions unsuccessful or patient reports new pain  Outcome: Progressing     Problem: INFECTION - ADULT  Goal: Absence or prevention of progression during hospitalization  Description: INTERVENTIONS:- Assess and monitor for signs and symptoms of infection- Monitor lab/diagnostic results- Monitor all insertion sites, i.e. indwelling lines, tubes, and drains- Monitor endotracheal if appropriate and nasal secretions for changes in amount and color- [place] appropriate cooling/warming therapies per order- Administer medications as ordered- Instruct and encourage patient and family to use good hand hygiene technique- Identify and instruct in appropriate isolation precautions for identified infection/condition  Outcome: Progressing  Goal: Absence of fever/infection during neutropenic period  Description: INTERVENTIONS:- Monitor WBC  Outcome: Progressing     Problem: SAFETY ADULT  Goal: Patient will remain free of falls  Description: INTERVENTIONS:- Educate patient/family on patient safety including physical limitations- Instruct patient to call for assistance with activity - Consult OT/PT to assist with strengthening/mobility - Keep Call bell within reach- Keep bed low and locked with side rails adjusted as appropriate- Keep care items and personal belongings within reach- Initiate and maintain comfort rounds- Make Fall Risk Sign visible to staff- Offer Toileting every 2 Hours, in advance of need- Initiate/Maintain alarm-  Obtain necessary fall risk management equipment: - Apply yellow socks and bracelet for high fall risk patients- Consider moving patient to room near nurses station  Outcome: Progressing  Goal: Maintain or return to baseline ADL function  Description: INTERVENTIONS:-  Assess patient's ability to carry out ADLs; assess patient's baseline for ADL function and identify physical deficits which impact ability to perform ADLs (bathing, care of mouth/teeth, toileting, grooming, dressing, etc.)- Assess/evaluate cause of self-care deficits - Assess range of motion- Assess patient's mobility; develop plan if impaired- Assess patient's need for assistive devices and provide as appropriate- Encourage maximum independence but intervene and supervise when necessary- Involve family in performance of ADLs- Assess for home care needs following discharge - Consider OT consult to assist with ADL evaluation and planning for discharge- Provide patient education as appropriate  Outcome: Progressing  Goal: Maintains/Returns to pre admission functional level  Description: INTERVENTIONS:- Perform AM-PAC 6 Click Basic Mobility/ Daily Activity assessment daily.- Set and communicate daily mobility goal to care team and patient/family/caregiver. - Collaborate with rehabilitation services on mobility goals if consulted- Perform Range of Motion 3 times a day.- Reposition patient every 2 hours.- Dangle patient 3 times a day- Stand patient 3 times a day- Ambulate patient 3 times a day- Out of bed to chair 3 times a day - Out of bed for meals 3 times a day- Out of bed for toileting- Record patient progress and toleration of activity level   Outcome: Progressing     Problem: DISCHARGE PLANNING  Goal: Discharge to home or other facility with appropriate resources  Description: INTERVENTIONS:- Identify barriers to discharge w/patient and caregiver- Arrange for needed discharge resources and transportation as appropriate- Identify discharge learning  needs (meds, wound care, etc.)- Arrange for interpretive services to assist at discharge as needed- Refer to Case Management Department for coordinating discharge planning if the patient needs post-hospital services based on physician/advanced practitioner order or complex needs related to functional status, cognitive ability, or social support system  Outcome: Progressing     Problem: Prexisting or High Potential for Compromised Skin Integrity  Goal: Skin integrity is maintained or improved  Description: INTERVENTIONS:- Identify patients at risk for skin breakdown- Assess and monitor skin integrity- Assess and monitor nutrition and hydration status- Monitor labs - Assess for incontinence - Turn and reposition patient- Assist with mobility/ambulation- Relieve pressure over bony prominences- Avoid friction and shearing- Provide appropriate hygiene as needed including keeping skin clean and dry- Evaluate need for skin moisturizer/barrier cream- Collaborate with interdisciplinary team - Patient/family teaching- Consider wound care consult   Outcome: Progressing     Problem: METABOLIC, FLUID AND ELECTROLYTES - ADULT  Goal: Electrolytes maintained within normal limits  Description: INTERVENTIONS:- Monitor labs and assess patient for signs and symptoms of electrolyte imbalances- Administer electrolyte replacement as ordered- Monitor response to electrolyte replacements, including repeat lab results as appropriate- Instruct patient on fluid and nutrition as appropriate  Outcome: Progressing  Goal: Fluid balance maintained  Description: INTERVENTIONS:- Monitor labs - Monitor I/O and WT- Instruct patient on fluid and nutrition as appropriate- Assess for signs & symptoms of volume excess or deficit  Outcome: Progressing     Problem: Nutrition/Hydration-ADULT  Goal: Nutrient/Hydration intake appropriate for improving, restoring or maintaining nutritional needs  Description: Monitor and assess patient's nutrition/hydration  status for malnutrition. Collaborate with interdisciplinary team and initiate plan and interventions as ordered.  Monitor patient's weight and dietary intake as ordered or per policy. Utilize nutrition screening tool and intervene as necessary. Determine patient's food preferences and provide high-protein, high-caloric foods as appropriate. INTERVENTIONS:- Monitor oral intake, urinary output, labs, and treatment plans- Assess nutrition and hydration status and recommend course of action- Evaluate amount of meals eaten- Assist patient with eating if necessary - Allow adequate time for meals- Recommend/ encourage appropriate diets, oral nutritional supplements, and vitamin/mineral supplements- Order, calculate, and assess calorie counts as needed- Recommend, monitor, and adjust tube feedings and TPN/PPN based on assessed needs- Assess need for intravenous fluids- Provide specific nutrition/hydration education as appropriate- Include patient/family/caregiver in decisions related to nutrition  Outcome: Progressing     Problem: Nutrition/Hydration-ADULT  Goal: Nutrient/Hydration intake appropriate for improving, restoring or maintaining nutritional needs  Description: Monitor and assess patient's nutrition/hydration status for malnutrition. Collaborate with interdisciplinary team and initiate plan and interventions as ordered.  Monitor patient's weight and dietary intake as ordered or per policy. Utilize nutrition screening tool and intervene as necessary. Determine patient's food preferences and provide high-protein, high-caloric foods as appropriate. INTERVENTIONS:- Monitor oral intake, urinary output, labs, and treatment plans- Assess nutrition and hydration status and recommend course of action- Evaluate amount of meals eaten- Assist patient with eating if necessary - Allow adequate time for meals- Recommend/ encourage appropriate diets, oral nutritional supplements, and vitamin/mineral supplements- Order, calculate,  and assess calorie counts as needed- Recommend, monitor, and adjust tube feedings and TPN/PPN based on assessed needs- Assess need for intravenous fluids- Provide specific nutrition/hydration education as appropriate- Include patient/family/caregiver in decisions related to nutrition  Outcome: Progressing

## 2025-04-26 NOTE — PLAN OF CARE
Post-Dialysis RN Treatment Note    Blood Pressure:  Pre 160/85 mm/Hg  Post 132/54 mmHg   EDW:  96.5 kg    Weight:  Pre 95.8 kg   Post 93.9 kg   Mode of weight measurement: Standing Scale   Volume Removed:  1500 ml    Treatment duration: 170 minutes    NS given:  No    Treatment shortened Yes, describe: HD terminated with 40 min remaining per patient request, Dr. Menard aware.   Medications given during Rx: Epogen, Oxycodone   Estimated Kt/V:  0.92   Access type: Permacath/TDC   Needle Gauge: N/A   Access Issues: No    Report called to primary nurse:   Yes Meaghan TUCKER RN      Plan for 3.5 hour HD treatment with 2 kg UF as tolerated. Treatment plan reviewed with Dr. Menard and patient.    Problem: METABOLIC, FLUID AND ELECTROLYTES - ADULT  Goal: Electrolytes maintained within normal limits  Description: INTERVENTIONS:- Monitor labs and assess patient for signs and symptoms of electrolyte imbalances- Administer electrolyte replacement as ordered- Monitor response to electrolyte replacements, including repeat lab results as appropriate- Instruct patient on fluid and nutrition as appropriate  Outcome: Progressing  Goal: Fluid balance maintained  Description: INTERVENTIONS:- Monitor labs - Monitor I/O and WT- Instruct patient on fluid and nutrition as appropriate- Assess for signs & symptoms of volume excess or deficit  Outcome: Progressing

## 2025-04-26 NOTE — ASSESSMENT & PLAN NOTE
Volume: Hypervolemic  Blood pressure: Hypertensive, /55  UF on HD keep him at his dry weight  Low-sodium diet   Nifedipine 30 mg daily

## 2025-04-26 NOTE — PROGRESS NOTES
"Progress Note - Hospitalist   Name: Naresh Carpio 64 y.o. male I MRN: [id number]  Unit/Bed#: -01 I Date of Admission: 4/11/2025   Date of Service: 4/26/2025 I Hospital Day: 15    Assessment & Plan  Traumatic retroperitoneal hematoma  Admitted to the trauma service after a fall with L2 vertebral body and retroperitoneal hematoma.  S/p IR embolization of distal right deep circumflex iliac artery/distal right superior gluteal artery both of which supplied an area of active extravasation and then pseudoaneurysms present at distal branches of the left inferior gluteal artery. The artery was embolized using coils and gelfoam.   He was type and screened 4/17/25 in case needed blood during HD on that day.  Hgb 4/22 improved to 8.4  Anemia due to chronic kidney disease, on chronic dialysis ([facility])  Had 5 U PRBCs during hospital stay  Hemoglobin has been running mostly 7.3-7.4.    On 4/14/25 was 7.6; on 4/15/24 was 7.2 ---> Renal increased his Epogen in light of his hemoglobin  On 4/17, hemoglobin had increased to 7.7  4/19/25 Hgb up to 8.1.   CBC Tuesday in HD 4/22/25 = 8.4 and stable  ESRD (end stage renal disease) ([facility])  Continue Sevelamer TID with meals  Renal following  HD T-TH-Sat while here in rehab  Paroxysmal atrial fibrillation ([facility])  On no rate control medications  Examines RRR  Anticoagulation with Eliquis as at home but he doesn't want to take it because he is worried it will make his right eye vision worse.  I d/w on call Optho Dr Diaz = \"The risk is not quantified in the literature. Dr edmond's note suggests a diagnosis of proliferative diabetic retinopathy and bilateral and recurrent vitreous hemorrhage. So he is at risk for - and has had hemorrhage - whether or not he is on eliquis (assuming dr edmond is correct).     I would always recommend life and death are more important so to do what you recommend re his systemic condition, and then to follow up with dr edmond as he recommended so a real " "exam can be done in the clinic\".   I explained all this to him and he understands  He has been taking the Eliquis.  Chronic diastolic (congestive) heart failure (HCC)  Appears euvolemic except for LE pitting edema which appears to be chronic  Renal diet  stable  Volume management via HD  Primary hypertension  Continue Procardia XL 30 mg qd  Tx per renal  stable  Visual disturbance  Reported a right eye \"floater\" without associated pain earlier in his hospitalization which has resolved but continued to have blurry central vision.  Ophthalmology saw here in [facility] = visual acuity without correction is 20/400 OU. Dx: Proliferative diabetic retinopathy with vitreous hemorrhage OU as well as cataracts.   Will need outpatient follow up immediately after discharge from rehab  Diabetic ulcer of left midfoot associated with type 2 diabetes mellitus, limited to breakdown of skin (HCC)  Continue local care  Being followed by WC  Last LEADS was 3/28/25 = 50-75% right prox polital and prox tibioperoneal trunk; >75% stenosis in prox popliteal artery on left  Was seen by VS in 12/2024 hospital stay and he declined any intervention at the time  Podiatry saw here on 4/22/25, s/p debridement = no surgical intervention needed at this time  See below  Hyponatremia  Renal following  Continue fluid restriction 1200 cc per 24 hours  Closed fracture of proximal end of left humerus with routine healing  Occurred from a fall in February and eval'd at the time of the injury  Was to continue NWB/sling and followup with Ortho but there was no followup done  Xray 4/13/25 = \"Unchanged alignment of the chronic greater tuberosity avulsion fracture\".   Ortho saw here on 4/14 = [facility]AT; see Dr Cisneros 4 weeks  Gout  Continue Allopurinol  L2 vertebral fracture (HCC)  CT CAP 4/2: Fracture of L2 vertebral body extending to the superior endplate  Continue LSO brace  Insomnia  Patient reports not sleeping well at night because he has trouble getting " comfortable. He has been trying to sleep in the recliner   PAD (peripheral artery disease) (HCC)  Vascular surgery saw per Podiatry's recommendation = they will see in the office.  They advised start ASA but after the d/w the PA in relation to his vitreous hemorrhages, can hold off on that and d/w him in the office.  This will give him time to see the Retina specialist as OP as about getting tx    The above assessment and plan was reviewed and updated as determined by my evaluation of the patient on 4/26/2025.    History of Present Illness   Patient seen and examined. Patients overnight issues or events were reviewed with nursing staff. New or overnight issues include the following:     Pt seen in his room. He states that he is doing well. He does admit to fatigue from dialysis. He denies any other complaints.    A 10 point review of systems was negative except for what is noted in the HPI.    Objective :  Temp:  [97.5 °F (36.4 °C)-97.8 °F (36.6 °C)] 97.8 °F (36.6 °C)  HR:  [58-69] 67  BP: ()/(46-85) 116/54  Resp:  [16-18] 17  SpO2:  [97 %-99 %] 97 %  O2 Device: None (Room air)    Invasive Devices       Hemodialysis Catheter  Duration             HD Permanent Double Catheter 605 days                    Physical Exam  General Appearance: NAD; pleasant  HEENT: PERRLA, conjuctiva normal; mucous membranes moist; face symmetrical  Neck:  Supple  Lungs: clear bilaterally, normal respiratory effort, no retractions, expiratory effort normal, on room air  CV: regular rate and rhythm, no murmurs rubs or gallops noted   ABD: soft non tender, +BS x4  EXT: DP pulses intact, no lymphadenopathy, + bilat LE edema  Skin: normal turgor, normal texture, no rash  Psych: affect normal, mood normal  Neuro: AAOx3    The above physical exam was reviewed and updated as determined by my evaluation of the patient on 4/26/2025.      Lab Results: I have reviewed the following results:  Results from last 7 days   Lab Units 04/22/25  0239    WBC Thousand/uL 7.27   HEMOGLOBIN g/dL 8.4*   HEMATOCRIT % 26.4*   PLATELETS Thousands/uL 327     Results from last 7 days   Lab Units 04/22/25  1237   SODIUM mmol/L 129*   POTASSIUM mmol/L 4.3   CHLORIDE mmol/L 89*   CO2 mmol/L 29   BUN mg/dL 48*   CREATININE mg/dL 7.18*   CALCIUM mg/dL 8.9                   Imaging Results Review: No pertinent imaging studies reviewed.  Other Study Results Review: No additional pertinent studies reviewed.    Review of Scheduled Meds: Medications  reviewed and reconciled as needed  Current Facility-Administered Medications   Medication Dose Route Frequency Provider Last Rate    acetaminophen  975 mg Oral Q8H [facility] Damian Cerda MD      allopurinol  100 mg Oral Daily Damian Cerda MD      aluminum-magnesium hydroxide-simethicone  30 mL Oral Q4H PRN GRANT Church      apixaban  5 mg Oral BID Damian Cerda MD      atorvastatin  80 mg Oral Daily With Dinner Damian Cerda MD      bisacodyl  10 mg Rectal Daily PRN Michelle T Landa, DO      calcium carbonate  500 mg Oral Daily PRN GRANT Church      docusate sodium  100 mg Oral BID Michelle T Landa, DO      epoetin jessica  8,000 Units Intravenous After Dialysis German Linton MD      gabapentin  100 mg Oral Once per day on Monday Wednesday Friday Damian Cerda MD      lidocaine  1 patch Topical Daily PRN Damian Cerda MD      melatonin  6 mg Oral HS Jayden Rowland MD      methocarbamol  1,000 mg Oral Q8H [facility] Jayden Rowland MD      naloxone  0.04 mg Intravenous Q1MIN PRN Damian Cerda MD      NIFEdipine  30 mg Oral After Dinner Damian Cerda MD      ondansetron  4 mg Oral Q6H PRN Gino Austin PA-C      oxyCODONE  5 mg Oral Q4H PRN Damian Cerda MD      Or    oxyCODONE  10 mg Oral Q4H PRN Damian Cerda MD      oxyCODONE  2.5 mg Oral Q8H PRN Jayden Rowland MD      polyethylene glycol  17 g Oral Daily PRN Michelle T Landa, DO      senna  2 tablet Oral Daily With Lunch Michelle T Landa, DO      sevelamer  1,600 mg Oral  TID With Meals Damian Cerda MD      white petrolatum-mineral oil   Topical TID PRN Michelle Landa DO         VTE Pharmacologic Prophylaxis: Eliquis  Code Status: Level 1 - Full Code  Current Length of Stay: 15 day(s)    Administrative Statements   I have spent a total time of 35 minutes in caring for this patient on the day of the visit/encounter including Prognosis, Risks and benefits of tx options, Instructions for management, Patient and family education, Importance of tx compliance, Risk factor reductions, Impressions, Counseling / Coordination of care, Documenting in the medical record, Reviewing/placing orders in the medical record (including tests, medications, and/or procedures), Obtaining or reviewing history  , and Communicating with other healthcare professionals .  ** Please Note:  voice to text software may have been used in the creation of this document. Although proof errors in transcription or interpretation are a potential of such software**

## 2025-04-27 PROCEDURE — 97530 THERAPEUTIC ACTIVITIES: CPT

## 2025-04-27 PROCEDURE — 99232 SBSQ HOSP IP/OBS MODERATE 35: CPT | Performed by: PHYSICIAN ASSISTANT

## 2025-04-27 RX ADMIN — ACETAMINOPHEN 975 MG: 325 TABLET, FILM COATED ORAL at 21:13

## 2025-04-27 RX ADMIN — METHOCARBAMOL 1000 MG: 500 TABLET ORAL at 14:22

## 2025-04-27 RX ADMIN — DOCUSATE SODIUM 100 MG: 100 CAPSULE, LIQUID FILLED ORAL at 07:41

## 2025-04-27 RX ADMIN — Medication 6 MG: at 21:13

## 2025-04-27 RX ADMIN — CALCIUM CARBONATE (ANTACID) CHEW TAB 500 MG 500 MG: 500 CHEW TAB at 17:00

## 2025-04-27 RX ADMIN — APIXABAN 5 MG: 5 TABLET, FILM COATED ORAL at 07:41

## 2025-04-27 RX ADMIN — SEVELAMER HYDROCHLORIDE 1600 MG: 800 TABLET ORAL at 07:40

## 2025-04-27 RX ADMIN — ACETAMINOPHEN 975 MG: 325 TABLET, FILM COATED ORAL at 05:20

## 2025-04-27 RX ADMIN — METHOCARBAMOL 1000 MG: 500 TABLET ORAL at 05:20

## 2025-04-27 RX ADMIN — DOCUSATE SODIUM 100 MG: 100 CAPSULE, LIQUID FILLED ORAL at 17:00

## 2025-04-27 RX ADMIN — NIFEDIPINE 30 MG: 30 TABLET, FILM COATED, EXTENDED RELEASE ORAL at 17:00

## 2025-04-27 RX ADMIN — ALLOPURINOL 100 MG: 100 TABLET ORAL at 07:41

## 2025-04-27 RX ADMIN — ACETAMINOPHEN 975 MG: 325 TABLET, FILM COATED ORAL at 14:22

## 2025-04-27 RX ADMIN — SEVELAMER HYDROCHLORIDE 1600 MG: 800 TABLET ORAL at 12:47

## 2025-04-27 RX ADMIN — OXYCODONE HYDROCHLORIDE 10 MG: 10 TABLET ORAL at 21:14

## 2025-04-27 RX ADMIN — OXYCODONE HYDROCHLORIDE 10 MG: 10 TABLET ORAL at 03:33

## 2025-04-27 RX ADMIN — SEVELAMER HYDROCHLORIDE 1600 MG: 800 TABLET ORAL at 16:37

## 2025-04-27 RX ADMIN — OXYCODONE HYDROCHLORIDE 10 MG: 10 TABLET ORAL at 07:41

## 2025-04-27 RX ADMIN — SENNOSIDES 17.2 MG: 8.6 TABLET, FILM COATED ORAL at 12:47

## 2025-04-27 RX ADMIN — ATORVASTATIN CALCIUM 80 MG: 80 TABLET, FILM COATED ORAL at 16:36

## 2025-04-27 RX ADMIN — OXYCODONE HYDROCHLORIDE 10 MG: 10 TABLET ORAL at 17:00

## 2025-04-27 RX ADMIN — APIXABAN 5 MG: 5 TABLET, FILM COATED ORAL at 17:00

## 2025-04-27 RX ADMIN — METHOCARBAMOL 1000 MG: 500 TABLET ORAL at 21:13

## 2025-04-27 RX ADMIN — CALCIUM CARBONATE (ANTACID) CHEW TAB 500 MG 500 MG: 500 CHEW TAB at 03:33

## 2025-04-27 NOTE — PROGRESS NOTES
04/27/25 1400   Pain Assessment   Pain Assessment Tool 0-10   Pain Score 10 - Worst Possible Pain   Pain Onset/Description Descriptor: Sore   Patient's Stated Pain Goal No pain   Hospital Pain Intervention(s) Rest   Assessment   Treatment Assessment Pt refused PT session 2/2 increased pain today. When asked if he would like the nurse to see if he was due for pain medication he denied. Pt educated on importance on PT and his schedule for tomorrow. Cont POC as tolerated with cont focus on stair management, increased gait, increased act tolerance and increased pain control to decrease burden of care.   Therapy Time missed   Time missed? Yes   Amount of time missed 60   Reason for time missed Illness   Time(s) multiple attempts made 2

## 2025-04-27 NOTE — PLAN OF CARE
Problem: PAIN - ADULT  Goal: Verbalizes/displays adequate comfort level or baseline comfort level  Description: Interventions:- Encourage patient to monitor pain and request assistance- Assess pain using appropriate pain scale- Administer analgesics based on type and severity of pain and evaluate response- Implement non-pharmacological measures as appropriate and evaluate response- Consider cultural and social influences on pain and pain management- Notify physician/advanced practitioner if interventions unsuccessful or patient reports new pain  Outcome: Progressing     Problem: INFECTION - ADULT  Goal: Absence or prevention of progression during hospitalization  Description: INTERVENTIONS:- Assess and monitor for signs and symptoms of infection- Monitor lab/diagnostic results- Monitor all insertion sites, i.e. indwelling lines, tubes, and drains- Monitor endotracheal if appropriate and nasal secretions for changes in amount and color- [place] appropriate cooling/warming therapies per order- Administer medications as ordered- Instruct and encourage patient and family to use good hand hygiene technique- Identify and instruct in appropriate isolation precautions for identified infection/condition  Outcome: Progressing  Goal: Absence of fever/infection during neutropenic period  Description: INTERVENTIONS:- Monitor WBC  Outcome: Progressing     Problem: SAFETY ADULT  Goal: Patient will remain free of falls  Description: INTERVENTIONS:- Educate patient/family on patient safety including physical limitations- Instruct patient to call for assistance with activity - Consult OT/PT to assist with strengthening/mobility - Keep Call bell within reach- Keep bed low and locked with side rails adjusted as appropriate- Keep care items and personal belongings within reach- Initiate and maintain comfort rounds- Make Fall Risk Sign visible to staff- Offer Toileting every 2 Hours, in advance of need- Initiate/Maintain alarm-  Obtain necessary fall risk management equipment: - Apply yellow socks and bracelet for high fall risk patients- Consider moving patient to room near nurses station  Outcome: Progressing  Goal: Maintain or return to baseline ADL function  Description: INTERVENTIONS:-  Assess patient's ability to carry out ADLs; assess patient's baseline for ADL function and identify physical deficits which impact ability to perform ADLs (bathing, care of mouth/teeth, toileting, grooming, dressing, etc.)- Assess/evaluate cause of self-care deficits - Assess range of motion- Assess patient's mobility; develop plan if impaired- Assess patient's need for assistive devices and provide as appropriate- Encourage maximum independence but intervene and supervise when necessary- Involve family in performance of ADLs- Assess for home care needs following discharge - Consider OT consult to assist with ADL evaluation and planning for discharge- Provide patient education as appropriate  Outcome: Progressing  Goal: Maintains/Returns to pre admission functional level  Description: INTERVENTIONS:- Perform AM-PAC 6 Click Basic Mobility/ Daily Activity assessment daily.- Set and communicate daily mobility goal to care team and patient/family/caregiver. - Collaborate with rehabilitation services on mobility goals if consulted- Perform Range of Motion 3 times a day.- Reposition patient every 2 hours.- Dangle patient 3 times a day- Stand patient 3 times a day- Ambulate patient 3 times a day- Out of bed to chair 3 times a day - Out of bed for meals 3 times a day- Out of bed for toileting- Record patient progress and toleration of activity level   Outcome: Progressing     Problem: DISCHARGE PLANNING  Goal: Discharge to home or other facility with appropriate resources  Description: INTERVENTIONS:- Identify barriers to discharge w/patient and caregiver- Arrange for needed discharge resources and transportation as appropriate- Identify discharge learning  needs (meds, wound care, etc.)- Arrange for interpretive services to assist at discharge as needed- Refer to Case Management Department for coordinating discharge planning if the patient needs post-hospital services based on physician/advanced practitioner order or complex needs related to functional status, cognitive ability, or social support system  Outcome: Progressing     Problem: Prexisting or High Potential for Compromised Skin Integrity  Goal: Skin integrity is maintained or improved  Description: INTERVENTIONS:- Identify patients at risk for skin breakdown- Assess and monitor skin integrity- Assess and monitor nutrition and hydration status- Monitor labs - Assess for incontinence - Turn and reposition patient- Assist with mobility/ambulation- Relieve pressure over bony prominences- Avoid friction and shearing- Provide appropriate hygiene as needed including keeping skin clean and dry- Evaluate need for skin moisturizer/barrier cream- Collaborate with interdisciplinary team - Patient/family teaching- Consider wound care consult   Outcome: Progressing     Problem: METABOLIC, FLUID AND ELECTROLYTES - ADULT  Goal: Electrolytes maintained within normal limits  Description: INTERVENTIONS:- Monitor labs and assess patient for signs and symptoms of electrolyte imbalances- Administer electrolyte replacement as ordered- Monitor response to electrolyte replacements, including repeat lab results as appropriate- Instruct patient on fluid and nutrition as appropriate  Outcome: Progressing  Goal: Fluid balance maintained  Description: INTERVENTIONS:- Monitor labs - Monitor I/O and WT- Instruct patient on fluid and nutrition as appropriate- Assess for signs & symptoms of volume excess or deficit  Outcome: Progressing     Problem: Nutrition/Hydration-ADULT  Goal: Nutrient/Hydration intake appropriate for improving, restoring or maintaining nutritional needs  Description: Monitor and assess patient's nutrition/hydration  status for malnutrition. Collaborate with interdisciplinary team and initiate plan and interventions as ordered.  Monitor patient's weight and dietary intake as ordered or per policy. Utilize nutrition screening tool and intervene as necessary. Determine patient's food preferences and provide high-protein, high-caloric foods as appropriate. INTERVENTIONS:- Monitor oral intake, urinary output, labs, and treatment plans- Assess nutrition and hydration status and recommend course of action- Evaluate amount of meals eaten- Assist patient with eating if necessary - Allow adequate time for meals- Recommend/ encourage appropriate diets, oral nutritional supplements, and vitamin/mineral supplements- Order, calculate, and assess calorie counts as needed- Recommend, monitor, and adjust tube feedings and TPN/PPN based on assessed needs- Assess need for intravenous fluids- Provide specific nutrition/hydration education as appropriate- Include patient/family/caregiver in decisions related to nutrition  Outcome: Progressing     Problem: Nutrition/Hydration-ADULT  Goal: Nutrient/Hydration intake appropriate for improving, restoring or maintaining nutritional needs  Description: Monitor and assess patient's nutrition/hydration status for malnutrition. Collaborate with interdisciplinary team and initiate plan and interventions as ordered.  Monitor patient's weight and dietary intake as ordered or per policy. Utilize nutrition screening tool and intervene as necessary. Determine patient's food preferences and provide high-protein, high-caloric foods as appropriate. INTERVENTIONS:- Monitor oral intake, urinary output, labs, and treatment plans- Assess nutrition and hydration status and recommend course of action- Evaluate amount of meals eaten- Assist patient with eating if necessary - Allow adequate time for meals- Recommend/ encourage appropriate diets, oral nutritional supplements, and vitamin/mineral supplements- Order, calculate,  and assess calorie counts as needed- Recommend, monitor, and adjust tube feedings and TPN/PPN based on assessed needs- Assess need for intravenous fluids- Provide specific nutrition/hydration education as appropriate- Include patient/family/caregiver in decisions related to nutrition  Outcome: Progressing

## 2025-04-27 NOTE — PROGRESS NOTES
"OT daily treatment note       04/27/25 1000   Pain Assessment   Pain Assessment Tool 0-10   Pain Score 10 - Worst Possible Pain   Pain Location/Orientation Location: Back   Hospital Pain Intervention(s) Emotional support   Restrictions/Precautions   Precautions Fall Risk;Fluid restriction;Pain;Spinal precautions;Visual deficit  (daytime IRP w/ RW)   Braces or Orthoses LSO   Lifestyle   Autonomy \"I think everyone just needs to get on the same page\"   Sit to Stand   Type of Assistance Needed Independent   Physical Assistance Level No physical assistance   Comment RW   Sit to Stand CARE Score 6   Bed-Chair Transfer   Type of Assistance Needed Independent   Physical Assistance Level No physical assistance   Comment RW   Chair/Bed-to-Chair Transfer CARE Score 6   Toileting Hygiene   Type of Assistance Needed Independent   Physical Assistance Level No physical assistance   Comment CM/hygiene seated on BSC over toilet w/ RW   Toileting Hygiene CARE Score 6   Toilet Transfer   Type of Assistance Needed Independent   Physical Assistance Level No physical assistance   Comment pt reports at home he would squat over toilet to have a BM because if he sat on the toilet he would not be able to stand up. OT discussed using a BSC over the toilet as a raised toilet seat which pt was initially not agreeable to but OT urged pt to trial so pt can be progressed to IRP. Pt able to complete STS from BSC over toilet IND w/ use of RW. Will need to place order for standard BSC.   Toilet Transfer CARE Score 6   Cognition   Overall Cognitive Status Impaired   Arousal/Participation Cooperative   Attention Attends with cues to redirect   Orientation Level Oriented X4   Memory Decreased recall of precautions   Following Commands Follows one step commands without difficulty   Additional Activities   Additional Activities Other (Comment)   Additional Activities Comments session grossly focused on managing phone w/ google assistance. pt attempted to " "show OT how to use google assist but his method was not working. When OT clicked into the google assist kaya, it was asking to setup voice recognition and it was not working. Well over 30 mins were dedicated to trying to setup the voice recognition so all he would have to say is \"hey google\" and make a phone call. No success in getting that setup so OT researched other methods of accessing google. It was determined that the pt can access google assist by holding down home button for 2 seconds and say \"call ____ mobile\". This method worked and pt was able to place two phone call. OT wrote up a very large print step by step instruction on how to use google assist which includes \"press side button (to turn the phone on), swipe up on screen, press and hold home button and begin talking\". Pt would like to continue practicing this in upcoming sessions.   Activity Tolerance   Activity Tolerance Patient limited by pain   Other Comments   Assessment Pt declined wanting to complete ADLS this session reporting \"he can do this\" and he had other matters to focus on. One  being the phone situation (see above) and other was setting up a meeting with his sister Diana as apparently she is requesting a meeting with the team as she is concerned about him DCing home and he agrees. OT opened up the conversation by allowing the pt to state what his concerns are with DCing home. Pts primary concern was his vision. OT provided emotional validation but educated that his vision will not be fixed here at the hospital and that he needs to see a retina specialist. Pt inquired about cost of a retina specialist which OT was not able to answer. OT explained that PT and OT have been working on his ability to adapt w/ impaired vision and he was been doing well - until the pt can be seen, there are no further interventions to correct his vision at this time. Pt then stated another concern would be how he would get into the home and bring his bags in " "because he was told that medical transport will not assist him in the home. OT educated that that is where Diana comes in and ideally she would help you re-acclimate to being home. Pt stated \"we can't depend on Diana for everything but that is why she wants to meet with everyone so she can know what is expected of her\". OT requested pt to call diana so we can set a time. pt did call but she did not respond, a VM was left. OT discussed that ideally all diana would need to do is fill his pill box 1x/wk and go grocery shopping which pt states he believes is feasible. Pt then inquired about transportation for dialysis which OT educated that CM is working closely on and that has not yet been figured out entirely so his DC will be pending the ability to attend dialysis. Pt requested to stay an additional week so Diana could possibly spend his first weekend home with him. OT explained that based on his performance, we cannot justify an additional week and as of now, DC is pending dialysis and transport. OT inquired about any other concerns for DC home and he was not able to provide any. OT educated on OT-related concerns for pts DC as pt continues to refuse LHAE training for footwear and states he will be able to don/doff his own socks at home by propping his leg up on the bed but refuses to try it here. He stated \"the bed isnt the correct height\". OT stated we could raise the height to simulate and he continued to refuse and said \"I'll just never wear socks I dont care\". Pt cont to be self-limiting by refusing to complete ADL tasks necessary for home. Pt has a low frustration tolerance so it is challenging at times to try to encourage him to do tasks. Pt will need an ADL at next session to ensure pt is IND prior to DC home.    Assessment   Treatment Assessment Pt initially seen at 830 for skilled OT session but pt declined due to extent of pain. Scheduled was rearranged and seen again at 10am. Pt was agreeable to " "participate but refused an ADL. Pt MUST have an ADL prior to DC home. Session focusing on low vision education w/ use of phone and primarily DC planning. Pt then trialed for IRP. Assessed for safety with dynamic standing balance, functional activity tolerance and correct use of AD. Educated pt on safe transfer technique, correct DME use and insight into deficits to know when to appropriately take rest breaks or call for assistance. Pt demo good  carryover of education and has been provided with IRP with use of RW, LSO and BSC over toilet during AM (dist sup HS). Pt has verbalized understanding of IRP and agreed that non-adherence to provided IRP instructions will result in privileges being revoked. Team members notified of IRP in preparation for DC. Plan to have a family mtg w/ sister Diana on Wednesday at 1pm pending his DC plan, dialysis and transportation. Pt would benefit from cont skilled OT services to increase IND and safety with ADL tasks in prep for DC home. Please see below for details re: conversation w/ Diana and the plan moving forward.     11:30: OT placed phone call to sister Diana inquiring about her request for a family mtg. Diana expressed extreme concern for his ability to function IND at home and reports that she can help intermittently and can't be depended on due to her family dynamic. OT educated that progressed to IND w/ RW and all IADLs have been addressed. OT edu that eyes cannot be addressed here and his best chances are to see a retina specialist. Diana was questioning the validity of what OT was saying stating \"well you guys only do things once and then done but he's saying he can't go home and I believe him so I need to meet with you all\". OT edu pt that a family mtg can occur over the phone if necessary - diana declined stating she wants to do this in person so \"everyone can see her shaking and see how nervous she is\". OT requested a date and time for the mtg so it can be coordinated " "w/ the team. Diana to call back once she speaks w/ her daughter to determine what time is best.     14:00: Diana returns call and requests a mtg for 1pm Friday. OT edu that will not work as we do not anticipate the pt being at the [facility] that long. OT re-edu that pt is IND w/ mobility and we are now just waiting for CM to finalize dialysis and transportation. Diana expressed a lot of concern stating that she needs time before we \"kick him out\" because she has her two grandchildren and cannot be around all the time to assist him. OT reiterated she does NOT need to be around 24/7 and primarily she would just need to assist with groceries, pill box fillings and ideally to be present upon return home to help him get reacclimated to his environment. OT requested a mtg for early this week, Tuesday being the latest. Diana in agreement and will call back.     14:15: Diana returns call and is requesting Wednesday at 1pm. OT in agreement and informed her the team will be notified tomorrow. OT educated that the mtg time will be kept unless CM is able to finalize dialysis and transportation tomorrow where they could potentially be a DC sooner. Diana is requesting a DC Wednesday to ensure his dialysis is still in sync and states that if he can DC Wednesday then the family can take him home since they will be present for the mtg. OT to pass along to primary team. OT inquired about Tuesday and Diana stated her daughter is not available due to a doctors appt. Diana is also requesting a list of retina specialists in her area so she can make an appt for the pt. Diana inquired about who to call if she needs help if things are not going well at home w/ the pt. OT edu that pt will have HH therapies and they can provide a CM to facilitate any community-related questions/concerns.      Pending determination w/ CM, dialysis, transport and DC date, please inform Diana so she can make arrangements.      Prognosis Good   Problem List Decreased " strength;Decreased endurance;Impaired balance;Decreased mobility;Orthopedic restrictions;Pain;Impaired vision   Barriers to Discharge Inaccessible home environment;Decreased caregiver support   Plan   Treatment/Interventions ADL retraining;Functional transfer training;Therapeutic exercise;Endurance training;Patient/family training;Equipment eval/education;Compensatory technique education   Progress Progressing toward goals   OT Therapy Minutes   OT Time In 1000   OT Time Out 1130   OT Total Time (minutes) 90   OT Mode of treatment - Individual (minutes) 90   OT Mode of treatment - Concurrent (minutes) 0   OT Mode of treatment - Group (minutes) 0   OT Mode of treatment - Co-treat (minutes) 0   OT Mode of Treatment - Total time(minutes) 90 minutes   OT Cumulative Minutes 1070   Therapy Time missed   Time missed? No

## 2025-04-27 NOTE — NURSING NOTE
On 4/27,@ 5 am, the patient's right foot wound was noted to be bleeding. A dressing was applied and secured with Chidi wrap. A wound care consult has been initiated.

## 2025-04-27 NOTE — PROGRESS NOTES
"Progress Note - Hospitalist   Name: Naresh Carpio 64 y.o. male I MRN: [id number]  Unit/Bed#: -01 I Date of Admission: 4/11/2025   Date of Service: 4/27/2025 I Hospital Day: 16    Assessment & Plan  Traumatic retroperitoneal hematoma  Admitted to the trauma service after a fall with L2 vertebral body and retroperitoneal hematoma.  S/p IR embolization of distal right deep circumflex iliac artery/distal right superior gluteal artery both of which supplied an area of active extravasation and then pseudoaneurysms present at distal branches of the left inferior gluteal artery. The artery was embolized using coils and gelfoam.   He was type and screened 4/17/25 in case needed blood during HD on that day.  Hgb 4/22 improved to 8.4  Anemia due to chronic kidney disease, on chronic dialysis ([facility])  Had 5 U PRBCs during hospital stay  Hemoglobin has been running mostly 7.3-7.4.    On 4/14/25 was 7.6; on 4/15/24 was 7.2 ---> Renal increased his Epogen in light of his hemoglobin  On 4/17, hemoglobin had increased to 7.7  4/19/25 Hgb up to 8.1.   CBC Tuesday in HD 4/22/25 = 8.4 and stable  ESRD (end stage renal disease) ([facility])  Continue Sevelamer TID with meals  Renal following  HD T-TH-Sat while here in rehab  Paroxysmal atrial fibrillation ([facility])  On no rate control medications  Examines RRR  Anticoagulation with Eliquis as at home but he doesn't want to take it because he is worried it will make his right eye vision worse.  I d/w on call Optho Dr Diaz = \"The risk is not quantified in the literature. Dr edmond's note suggests a diagnosis of proliferative diabetic retinopathy and bilateral and recurrent vitreous hemorrhage. So he is at risk for - and has had hemorrhage - whether or not he is on eliquis (assuming dr edmond is correct).     I would always recommend life and death are more important so to do what you recommend re his systemic condition, and then to follow up with dr edmond as he recommended so a real " "exam can be done in the clinic\".   Explained all this to him and he understands  He has been taking the Eliquis.  Chronic diastolic (congestive) heart failure (HCC)  Appears euvolemic except for LE pitting edema which appears to be chronic  Renal diet  stable  Volume management via HD  Primary hypertension  Continue Procardia XL 30 mg qd  Tx per renal  stable  Visual disturbance  Reported a right eye \"floater\" without associated pain earlier in his hospitalization which has resolved but continued to have blurry central vision.  Ophthalmology saw here in [facility] = visual acuity without correction is 20/400 OU. Dx: Proliferative diabetic retinopathy with vitreous hemorrhage OU as well as cataracts.   Will need outpatient follow up immediately after discharge from rehab  Diabetic ulcer of left midfoot associated with type 2 diabetes mellitus, limited to breakdown of skin (HCC)  Continue local care  Being followed by WC  Last LEADS was 3/28/25 = 50-75% right prox polital and prox tibioperoneal trunk; >75% stenosis in prox popliteal artery on left  Was seen by VS in 12/2024 hospital stay and he declined any intervention at the time  Podiatry saw here on 4/22/25, s/p debridement = no surgical intervention needed at this time  Contacted about a new bleeding wound this AM. The bleeding has stopped. Podiatry is aware.  See below  Hyponatremia  Renal following  Continue fluid restriction 1200 cc per 24 hours  Closed fracture of proximal end of left humerus with routine healing  Occurred from a fall in February and eval'd at the time of the injury  Was to continue NWB/sling and followup with Ortho but there was no followup done  Xray 4/13/25 = \"Unchanged alignment of the chronic greater tuberosity avulsion fracture\".   Ortho saw here on 4/14 = WBAT; see Dr Cisneros 4 weeks  Gout  Continue Allopurinol  L2 vertebral fracture (HCC)  CT CAP 4/2: Fracture of L2 vertebral body extending to the superior endplate  Continue LSO " brace  Insomnia  Patient reports not sleeping well at night because he has trouble getting comfortable. He has been trying to sleep in the recliner   PAD (peripheral artery disease) ([facility])  Vascular surgery saw per Podiatry's recommendation = they will see in the office.  They advised start ASA but after the d/w the PA in relation to his vitreous hemorrhages, can hold off on that and d/w him in the office.  This will give him time to see the Retina specialist as OP as about getting tx    The above assessment and plan was reviewed and updated as determined by my evaluation of the patient on 4/27/2025.    History of Present Illness   Patient seen and examined. Patients overnight issues or events were reviewed with nursing staff. New or overnight issues include the following:     Pt seen in his room. He again expresses concern about going blind if he bleeds into his eyes while taking Eliquis. Reviewed ophthalmology input as well as the rationale of Eliquis. He did take Eliquis this AM. He denies any other complaints.    A 10 point review of systems was negative except for what is noted in the HPI.    Objective :  Temp:  [97.8 °F (36.6 °C)-98.3 °F (36.8 °C)] 98 °F (36.7 °C)  HR:  [66-83] 66  BP: (116-164)/(54-72) 164/72  Resp:  [17-19] 18  SpO2:  [94 %-97 %] 97 %  O2 Device: None (Room air)    Invasive Devices       Hemodialysis Catheter  Duration             HD Permanent Double Catheter 606 days                    Physical Exam  General Appearance: NAD; pleasant  HEENT: PERRLA, conjuctiva normal; mucous membranes moist; face symmetrical  Neck:  Supple  Lungs: clear bilaterally, normal respiratory effort, no retractions, expiratory effort normal, on room air  CV: regular rate and rhythm, no murmurs rubs or gallops noted   ABD: soft non tender, +BS x4  EXT: DP pulses intact, no lymphadenopathy, + bilat LE edema  Skin: normal turgor, normal texture, no rash  Psych: affect normal, mood normal  Neuro: AAOx3    The above  physical exam was reviewed and updated as determined by my evaluation of the patient on 4/27/2025.      Lab Results: I have reviewed the following results:  Results from last 7 days   Lab Units 04/22/25  1237   WBC Thousand/uL 7.27   HEMOGLOBIN g/dL 8.4*   HEMATOCRIT % 26.4*   PLATELETS Thousands/uL 327     Results from last 7 days   Lab Units 04/22/25  1237   SODIUM mmol/L 129*   POTASSIUM mmol/L 4.3   CHLORIDE mmol/L 89*   CO2 mmol/L 29   BUN mg/dL 48*   CREATININE mg/dL 7.18*   CALCIUM mg/dL 8.9                   Imaging Results Review: No pertinent imaging studies reviewed.  Other Study Results Review: No additional pertinent studies reviewed.    Review of Scheduled Meds: Medications  reviewed and reconciled as needed  Current Facility-Administered Medications   Medication Dose Route Frequency Provider Last Rate    acetaminophen  975 mg Oral Q8H [facility] Damian Cerda MD      allopurinol  100 mg Oral Daily Damian Cerda MD      aluminum-magnesium hydroxide-simethicone  30 mL Oral Q4H PRN GRANT Church      apixaban  5 mg Oral BID Damian Cerda MD      atorvastatin  80 mg Oral Daily With Dinner Damian Cerda MD      bisacodyl  10 mg Rectal Daily PRN Michelle Landa,       calcium carbonate  500 mg Oral Daily PRN GRANT Church      docusate sodium  100 mg Oral BID Michelleaura Landa, DO      epoetin jessica  8,000 Units Intravenous After Dialysis German Linton MD      gabapentin  100 mg Oral Once per day on Monday Wednesday Friday Damian Cerda MD      lidocaine  1 patch Topical Daily PRN Damian Cerda MD      melatonin  6 mg Oral HS Jayden Rowland MD      methocarbamol  1,000 mg Oral Q8H [facility] Jayden Rowland MD      naloxone  0.04 mg Intravenous Q1MIN PRN Damian Cerda MD      NIFEdipine  30 mg Oral After Dinner Damian Cerda MD      ondansetron  4 mg Oral Q6H PRN Gino Austin PA-C      oxyCODONE  5 mg Oral Q4H PRN Damian Cerda MD      Or    oxyCODONE  10 mg Oral Q4H PRN Damian Cerda MD       oxyCODONE  2.5 mg Oral Q8H PRN Jayden Rowland MD      polyethylene glycol  17 g Oral Daily PRN Michelle T Landa, DO      senna  2 tablet Oral Daily With Lunch Michelle T Landa, DO      sevelamer  1,600 mg Oral TID With Meals Damian Cerda MD      white petrolatum-mineral oil   Topical TID PRN Michelle T Landa, DO         VTE Pharmacologic Prophylaxis: Eliquis  Code Status: Level 1 - Full Code  Current Length of Stay: 16 day(s)    Administrative Statements   I have spent a total time of 35 minutes in caring for this patient on the day of the visit/encounter including Prognosis, Risks and benefits of tx options, Instructions for management, Patient and family education, Importance of tx compliance, Risk factor reductions, Impressions, Counseling / Coordination of care, Documenting in the medical record, Reviewing/placing orders in the medical record (including tests, medications, and/or procedures), Obtaining or reviewing history  , and Communicating with other healthcare professionals .  ** Please Note:  voice to text software may have been used in the creation of this document. Although proof errors in transcription or interpretation are a potential of such software**

## 2025-04-27 NOTE — ASSESSMENT & PLAN NOTE
"On no rate control medications  Examines RRR  Anticoagulation with Eliquis as at home but he doesn't want to take it because he is worried it will make his right eye vision worse.  I d/w on call Optho Dr Diaz = \"The risk is not quantified in the literature. Dr edmond's note suggests a diagnosis of proliferative diabetic retinopathy and bilateral and recurrent vitreous hemorrhage. So he is at risk for - and has had hemorrhage - whether or not he is on eliquis (assuming dr edmond is correct).     I would always recommend life and death are more important so to do what you recommend re his systemic condition, and then to follow up with dr edmond as he recommended so a real exam can be done in the clinic\".   Explained all this to him and he understands  He has been taking the Eliquis.  "

## 2025-04-27 NOTE — ASSESSMENT & PLAN NOTE
Continue local care  Being followed by WC  Last LEADS was 3/28/25 = 50-75% right prox polital and prox tibioperoneal trunk; >75% stenosis in prox popliteal artery on left  Was seen by VS in 12/2024 hospital stay and he declined any intervention at the time  Podiatry saw here on 4/22/25, s/p debridement = no surgical intervention needed at this time  Contacted about a new bleeding wound this AM. The bleeding has stopped. Podiatry is aware.  See below

## 2025-04-27 NOTE — PROGRESS NOTES
Progress Note - PMR   Name: Naresh Carpio 64 y.o. male I MRN: [id number]  Unit/Bed#: -01 I Date of Admission: 4/11/2025   Date of Service: 4/26/2025 I Hospital Day: 15     Assessment & Plan  Traumatic retroperitoneal hematoma  - Sustained from mechanical fall several days prior to initial presentation on 4/2/25, presenting with progressively worsening low back pain. Trauma imaging revealed a large retroperitoneal hematoma with active extravasation and hemoglobin drop. Now s/p transfusion, Kcentra and ultimately IR embolization of the right deep circumflex iliac artery and superior gluteal artery with Dr. Garcia on 4/3/25. Unfortunately, on 4/6/25, his hemoglobin decreased and repeat CT showed a 5 mm inferior gluteal artery branch pseudoaneurysm and he is s/p IR pelvic angiogram on 4/7/25 with Dr. Hernandez, which demonstrated pseudoaneurysms at the distal branches of the left inferior gluteal artery which was embolized.   > 4/15: Hgb 7.2 today (from 7.6 yesterday). Back pain seems controlled. Will be receiving increased Epogen today per IM/Nephro  > Hgb 8.4 on 4/22 labs    - Continue to trend Hgb on CBC closely  - Consider repeat imaging if Hgb continues to downtrend or pain significantly worsens  - Contact IR if there are additional bleeding concerns given multiple prior interventions described above  - Local right hip/groin access site incision care  - Analgesia, see below  - PT and OT  Acute pain due to trauma  - Evaluated by APS on acute care  - Tylenol 975 mg q8h scheduled  - Gabapentin 100 mg three times weekly  - Methocarbamol 1000 mg q8h scheduled  - Lidocaine patches daily PRN  - Oxycodone 5 or 10 mg q4h PRN, wean as possible  Impaired mobility and activities of daily living  - Rehabilitation medicine physician for daily monitoring of care, 24 hour availability for acute medical issues, medication management, and therapeutic and diagnostic assessments.  - 24 hour rehabilitation nursing 7 days per week  for: management/teaching of medications, bowel/bladder routine, skin care.  - PT, OT for 2-3 hours per day, 5 to 6 days per week; 15 hours per week  - Rehabilitation Psychology as needed for adjustment and coping  - CM for barriers to discharge, community resources, and family support  - Discharge planning following to help ensure a safe and efficient discharge  - 900/7 program due to HD status, pain, expected therapy tolerance  4/18- Ongoing dispo planning with patient/sister and CM. Staff expressed that patient is progressing well from a functional standpoint but continues to have difficulties with ADLs/iADLs due to impaired vision. Will continue to work towards goals for safe dischare  4/22- DC date now pending outpatient HD setup    L2 vertebral fracture (HCC)  > Sustained from a mechanical fall on 3/24/25, found to have an acute, obliquely oriented fracture of the L2 vertebral body with distraction of the dominant fracture fragment and involvement of the anterior and posterior cortices and superior endplate  > At that time, managed non-operatively with LSO bracing, though it does not appear orthopedic spine surgery or neurosurgery evaluated at the time of injury    - Maintain LSO brace with lumbar spinal precautions   - Should follow-up with spine surgery outpatient  Closed fracture of proximal end of left humerus with routine healing  - Sustained from fall in early February, per CT of the LUE on 2/9/25, there was a minimally displaced fracture of the anterior facet of the greater tuberosity of the left humerus, minimally apparent on repeat x-ray of the shoulder on 3/24/25  - He was evaluated by orthopedic surgery at the time of the initial injury and was recommended to maintain NWB through the LUE with a sling  - There is no further orthopedic evaluation documentation and it does not appear that further follow-up occurred.  - Clinically, he is not describing ongoing significant left arm or shoulder pain and  "has been weight-bearing through the LUE  > 4/14: Repeat humerus X-ray done yesterday shows no change in fracture alignment. Ortho consulted.  >4/15: Ortho c/s: May WBAT to LUE. PT/OT to increase strength and ROM. F/u with Dr. Cisneros in 4 weeks    - Monitor  - PT/OT  Insomnia  Patient reports he has an overall a poor sleep hygiene: he sleeps at 1-2am nightly and watches TV up until bed.  Advised patient to attempt to regulate sleep/wake cycle while here to better participate in therapy. Attempt to try to sleep earlier around midnight  >4/15: reviewed sleep log: Pt went to sleep around midnight and slept for 6 hours.  > 4/17- Poor sleep last night: was awake every other hour. Requiring nap during the day. Can increase melatonin tomorrow if this persists  > 4/18: Poor sleep continues. Pt endorses moving from bed to chair multiple times throughout the night which is his baseline at home too    - Continue sleep log  - Continue melatonin 6mg nightly  At risk for constipation   4/15- Adjusted bowel medications: Colace 100mg BID, Senna 17.2mg with lunch and PRN Miralax/Suppos  4/17- No BM since 4/13. Lactulose this evening if no BM after lunch. Can consider Relistor if no success for opioid-inducted constipation  4/18- small hard BMs x2 yesterday after lactulose and suppository. Continue bowel regimen  >BM 4/22 but required Relistor and suppository/lactulose  > Last BM 4/25    - Will follow BMs and adjust bowel regimen to target soft, formed stools q1-2 days, per pt's regular schedule.  Anemia due to chronic kidney disease, on chronic dialysis (HCC)  > 4/14: Hgb 7.6  > 4/15: Hgb 7.2 - Discussed with IM who is coordinating with Nephrology; they will increase his Epoetin jessica  > 4/17: Hgb 7.7  > 4/22: Hgb 8.4    - Trend Hgb on CBC  - Epoetin jessica with dialysis  - Transfusions per IM, appreciate their recommendations  Visual disturbance  - Reporting \"floater\" within the right eye with acute onset on 4/11/25 at mid-day while " "watching TV; describes a dark, \"spider\"-shaped floater that occupies a significant portion of the visual field, painless; has similar floaters in the left eye but smaller  - Ophthalmology consulted on [facility] admission by IM, per prior trauma surgery documentation, Dr. Chaudhry of ophthalmology was made personally aware of clinical situation on 4/11/25 prior to [facility] admission  > Ophthalmology consult 4/12: Exam c/w proliferative diabetic retinopathy with vitreous hemorrhage. Pt will follow up with ophtho immediately after discharge and for referral to a retina specialist  > 4/15- Pt thinks his R eye floater may be larger today, unable to clearly state or quantify symptoms. No other visual complaints of increased number of floaters, double/blurry vision, or bright flashes. Advised patient to inform us if symptoms change - will reach out to Ophthalmology accordingly  > 4/18- No changes noted in vision/floaters. His overall impaired vision continues to be a barrier for his ADLs/iADLs  > 4/20-24- Stable R eye floater    - Monitor for changes in vision  Diabetic ulcer of left midfoot associated with type 2 diabetes mellitus, limited to breakdown of skin (HCC)  - Has chronic wounds affecting the left foot plantar surface, left foot 1st and 2nd digits  - Left Plantar Foot Wounds and Left toes:  Cleanse with NSS and pat dry. Paint all wounds with Betadine daily and cover plantar open wound with silicone bordered foam dressing. Naren with T for Treatment and change every other day or PRN   > 4/22 : Wound care recs: Paint left foot 1st and 2nd digits with betadine dailyand covering plantar open wound with foam. Podiatry consulted   > 4/22: Podiatry did bedside debridement of left foot plantar ulcer. Continue with local wound care, no surgical intervention needed. SALVATORE ordered and vascular surgery consulted. Pt to maintain frequent LLE elevation, strict pressure and fracture reduction. WBAT to LLE  > 4/24: Pictures of LLE reviewed on " chart. Wounds are stable with no signs of infection. XR L foot 4/22 with no evidence of OM. ABIs and toe pressures are within healing range. Pt to continue to follow up with wound care for further management  PAD (peripheral artery disease) (HCC)  > Non invasive arterial studies from 3/28:  Diffuse disease noted throughout the femoral-popliteal arteries. Evidence of 50-75% stenosis in the proximal popliteal artery. Evidence of 50-75% stenosis at the proximal tibio-peroneal trunk.   LLE: Diffuse disease noted throughout the femoral-popliteal arteries. Evidence of >75% stenosis in the proximal popliteal artery.  Tech Note: Patient refused SALVATORE.  SALVATORE 4/22: Left lower limb SALVATORE noncompressible, metatarsal pressure 155 mmHg, great toe pressure 72 mmHg. Right lower limb noncompressible SALVATORE, metatarsal pressure 158 mmHg, second toe pressure 101 mmHg   Vascular surgery consult pending  Hyponatremia  > 4/14: Na 129 - A&O x3, pt mentating appropriately  > 4/15: Na 125 - Discussed with IM who is coordinating with Nephrology; they will manage Na during HD   > Sodium levels continue to fluctuate from 125-131. Pt remains A&O x3. Nephrology will continue to manage electrolyte deficiencies    - Trend on BMP  - Electrolyte management per nephrology  - Continue HD in setting of ESRD  ESRD (end stage renal disease) (HCC)  - Continue HD  - Nephrology consulted and following, appreciate their recommendations  >4/22: new outpatient HD center needed close to his home after discharge. CM to follow up. DC pushed  Paroxysmal atrial fibrillation (HCC)  - Currently rate controlled  - Embolic stroke risk reduction with apixaban 5 mg BID    >4/22- Refusing it this morning. Extensive conversation had with patient to discuss the importance of compliance given his high stroke risk.   > 4/24- now compliant with Eliquis  Chronic diastolic (congestive) heart failure (HCC)  Wt Readings from Last 3 Encounters:   04/24/25 94.3 kg (208 lb)   04/11/25 98.5  kg (217 lb 2.5 oz)   03/24/25 107 kg (234 lb 12.6 oz)     - Most recent TTE from 10/9/24 with EF 60-65%, grade 1 diastolic dysfunction  - Volume management with HD per nephrology  - Monitor weights  - Appreciate medicine and nephrology recommendations and management  Primary hypertension  - Nifedipine  - Appreciate IM and nephrology management  Gout  - Allopurinol for flare prophylaxis  Hyperphosphatemia  > 4/15: Phos 4.6  - Management per nephrology  - Continue phosphate binders  Wound of skin  - Has right anterior pre-tibial scab, wash gently daily, leave open to air  - Wound care RN consulted and following  At high risk for skin breakdown  > 4/21- New sacral pressure injury noted by staff. Pictures reviewed on chart. Patient primarily sleeps in recliner (both here and at home). Discussed with therapy and pt will use a cushion that offers sacral relief on recliner. Will continue to monitor  >4/22- Wound care recs:  Cleanse wound with NSS, pat dry. Apply calcium alginate with silver to wound bed and cover with foam. Change every other day and prn. Cushion will not be covered per therapy for home - advised patient to find one off [employer] to continue offloading     - Moisturize skin daily with skin nourishing cream  - Ehob cushion in chair when out of bed.  - Preventative hydraguard to bilateral heels BID and PRN.   - Calazime paste to sacrum/buttocks BID and PRN  - Monitor clinically for breakdown, frequent turns  - Wound care RN consulted and following  At risk for venous thromboembolism (VTE)  - Apixaban, see above  Secondary hyperparathyroidism (HCC)      Subjective   Patient is a 64-year-old male with history of chronic anemia, atrial fibrillation, diabetes, end-stage renal disease on hemodialysis presenting on 4/2 with progressively worsening back pain after a fall found to have left-sided right show peritoneal hematoma with active extravasation status post Kcentra stabilization with transfusions and multiple IR  embolizations. Course complicated by acute on chronic pain     Chief Complaint: f/u ambulatory dysfunction    Interval: Patient seen and evaluated in room without any acute issues overnight.  Overall is in a decent mood today and is back from dialysis.  Reviewed notes from nephrology and this internal medicine.  Tolerated dialysis today without issues patient denies fever, chills, nausea, emesis, cough, shortness of breath, diarrhea, or constipation. Sleep was fine, mood stable. Pain is controlled.  No new labs to review today.  Eating variable amounts 50 to 100%, last bowel movement on 4/25.      Objective :  Temp:  [97.5 °F (36.4 °C)-98.3 °F (36.8 °C)] 98.3 °F (36.8 °C)  HR:  [58-83] 83  BP: ()/(46-85) 126/60  Resp:  [16-19] 19  SpO2:  [94 %-99 %] 94 %  O2 Device: None (Room air)    Functional Update:  Physical Therapy Occupational Therapy Speech Therapy   Weight Bearing Status: Full Weight Bearing  Transfers: Supervision  Bed Mobility: Supervision  Amulation Distance (ft): 200 feet (to 350 feet)  Ambulation: Supervision (DS)  Assistive Device for Ambulation: Roller Walker  Number of Stairs: 8  Assistive Device for Stairs: Bilateral Hand Rails  Stair Assistance: Supervision  Discharge Recommendations: Home with:  DC Home with:: Family Support, First Floor Setup, Home Physical Therapy   Eating: Independent  Grooming: Supervision  Bathing: Minimal Assistance  Bathing: Minimal Assistance  Upper Body Dressing: Supervision  Lower Body Dressing: Minimal Assistance  Toileting: Supervision  Tub/Shower Transfer: Supervision  Toilet Transfer: Supervision  Cognition: Exceptions to WNL  Cognition: Decreased Comprehension  Orientation: Person, Place, Time, Situation                 Physical Exam  Vitals reviewed.   Constitutional:       General: He is not in acute distress.  HENT:      Head: Normocephalic and atraumatic.      Right Ear: External ear normal.      Left Ear: External ear normal.      Nose: Nose normal. No  rhinorrhea.      Mouth/Throat:      Mouth: Mucous membranes are moist.      Pharynx: Oropharynx is clear.   Eyes:      General: No scleral icterus.  Cardiovascular:      Rate and Rhythm: Normal rate.      Pulses: Normal pulses.   Pulmonary:      Effort: Pulmonary effort is normal. No respiratory distress.   Abdominal:      General: There is no distension.      Palpations: Abdomen is soft.   Musculoskeletal:      Right lower leg: No edema.      Left lower leg: No edema.   Skin:     General: Skin is warm and dry.   Neurological:      Mental Status: He is alert and oriented to person, place, and time.      Sensory: Sensory deficit present.   Psychiatric:         Mood and Affect: Mood normal.         Behavior: Behavior normal.             Scheduled Meds:  Current Facility-Administered Medications   Medication Dose Route Frequency Provider Last Rate    acetaminophen  975 mg Oral Q8H [facility] Damian Cerda MD      allopurinol  100 mg Oral Daily Damian Cerda MD      aluminum-magnesium hydroxide-simethicone  30 mL Oral Q4H PRN GRANT Church      apixaban  5 mg Oral BID Damian Cerda MD      atorvastatin  80 mg Oral Daily With Dinner Damian Cerda MD      bisacodyl  10 mg Rectal Daily PRN Michelle Landa, DO      calcium carbonate  500 mg Oral Daily PRN GRANT Church      docusate sodium  100 mg Oral BID Michelle T Landa, DO      epoetin jessica  8,000 Units Intravenous After Dialysis German Linton MD      gabapentin  100 mg Oral Once per day on Monday Wednesday Friday Damian Cerda MD      lidocaine  1 patch Topical Daily PRN Damian Cerda MD      melatonin  6 mg Oral HS Jayden Rowland MD      methocarbamol  1,000 mg Oral Q8H [facility] Jayden Rowland MD      naloxone  0.04 mg Intravenous Q1MIN PRN Damian Cerda MD      NIFEdipine  30 mg Oral After Dinner Damian Cerda MD      ondansetron  4 mg Oral Q6H PRN Gino Ausitn PA-C      oxyCODONE  5 mg Oral Q4H PRN Damian Cerda MD      Or    oxyCODONE  10 mg Oral  Q4H PRN Damian Cerda MD      oxyCODONE  2.5 mg Oral Q8H PRN Jayden Rowland MD      polyethylene glycol  17 g Oral Daily PRN Michelle Landa,       senna  2 tablet Oral Daily With Lunch Michelle Landa DO      sevelamer  1,600 mg Oral TID With Meals Damian Cerda MD      white petrolatum-mineral oil   Topical TID PRN Michelle Landa,            Lab Results: I have reviewed the following results:  Results from last 7 days   Lab Units 04/22/25  1237   HEMOGLOBIN g/dL 8.4*   HEMATOCRIT % 26.4*   WBC Thousand/uL 7.27   PLATELETS Thousands/uL 327     Results from last 7 days   Lab Units 04/22/25  1237   BUN mg/dL 48*   SODIUM mmol/L 129*   POTASSIUM mmol/L 4.3   CHLORIDE mmol/L 89*   CREATININE mg/dL 7.18*                Santos Guan DO  Physical Medicine and Rehabilitation  [facility]    I have spent a total time of 35 minutes in caring for this patient on the day of the visit/encounter including Counseling / Coordination of care, Documenting in the medical record, and Communicating with other healthcare professionals .

## 2025-04-27 NOTE — ASSESSMENT & PLAN NOTE
"- Reporting \"floater\" within the right eye with acute onset on 4/11/25 at mid-day while watching TV; describes a dark, \"spider\"-shaped floater that occupies a significant portion of the visual field, painless; has similar floaters in the left eye but smaller  - Ophthalmology consulted on [facility] admission by IM, per prior trauma surgery documentation, Dr. Chaudhry of ophthalmology was made personally aware of clinical situation on 4/11/25 prior to [facility] admission  > Ophthalmology consult 4/12: Exam c/w proliferative diabetic retinopathy with vitreous hemorrhage. Pt will follow up with ophtho immediately after discharge and for referral to a retina specialist  > 4/15- Pt thinks his R eye floater may be larger today, unable to clearly state or quantify symptoms. No other visual complaints of increased number of floaters, double/blurry vision, or bright flashes. Advised patient to inform us if symptoms change - will reach out to Ophthalmology accordingly  > 4/18- No changes noted in vision/floaters. His overall impaired vision continues to be a barrier for his ADLs/iADLs  > 4/20-24- Stable R eye floater    - Monitor for changes in vision  "

## 2025-04-28 PROCEDURE — 97530 THERAPEUTIC ACTIVITIES: CPT

## 2025-04-28 PROCEDURE — 99232 SBSQ HOSP IP/OBS MODERATE 35: CPT | Performed by: STUDENT IN AN ORGANIZED HEALTH CARE EDUCATION/TRAINING PROGRAM

## 2025-04-28 PROCEDURE — 97116 GAIT TRAINING THERAPY: CPT

## 2025-04-28 PROCEDURE — 97535 SELF CARE MNGMENT TRAINING: CPT

## 2025-04-28 PROCEDURE — 99232 SBSQ HOSP IP/OBS MODERATE 35: CPT | Performed by: INTERNAL MEDICINE

## 2025-04-28 PROCEDURE — 99232 SBSQ HOSP IP/OBS MODERATE 35: CPT | Performed by: NURSE PRACTITIONER

## 2025-04-28 RX ADMIN — ATORVASTATIN CALCIUM 80 MG: 80 TABLET, FILM COATED ORAL at 17:27

## 2025-04-28 RX ADMIN — SEVELAMER HYDROCHLORIDE 1600 MG: 800 TABLET ORAL at 11:06

## 2025-04-28 RX ADMIN — ACETAMINOPHEN 975 MG: 325 TABLET, FILM COATED ORAL at 05:09

## 2025-04-28 RX ADMIN — DOCUSATE SODIUM 100 MG: 100 CAPSULE, LIQUID FILLED ORAL at 17:28

## 2025-04-28 RX ADMIN — ACETAMINOPHEN 975 MG: 325 TABLET, FILM COATED ORAL at 21:18

## 2025-04-28 RX ADMIN — ACETAMINOPHEN 975 MG: 325 TABLET, FILM COATED ORAL at 14:07

## 2025-04-28 RX ADMIN — GABAPENTIN 100 MG: 100 CAPSULE ORAL at 08:14

## 2025-04-28 RX ADMIN — METHOCARBAMOL 1000 MG: 500 TABLET ORAL at 14:07

## 2025-04-28 RX ADMIN — OXYCODONE HYDROCHLORIDE 10 MG: 10 TABLET ORAL at 14:08

## 2025-04-28 RX ADMIN — SENNOSIDES 17.2 MG: 8.6 TABLET, FILM COATED ORAL at 11:06

## 2025-04-28 RX ADMIN — METHOCARBAMOL 1000 MG: 500 TABLET ORAL at 05:09

## 2025-04-28 RX ADMIN — SEVELAMER HYDROCHLORIDE 1600 MG: 800 TABLET ORAL at 08:13

## 2025-04-28 RX ADMIN — ALLOPURINOL 100 MG: 100 TABLET ORAL at 08:14

## 2025-04-28 RX ADMIN — OXYCODONE HYDROCHLORIDE 10 MG: 10 TABLET ORAL at 18:26

## 2025-04-28 RX ADMIN — DOCUSATE SODIUM 100 MG: 100 CAPSULE, LIQUID FILLED ORAL at 08:14

## 2025-04-28 RX ADMIN — OXYCODONE HYDROCHLORIDE 10 MG: 10 TABLET ORAL at 04:28

## 2025-04-28 RX ADMIN — ALUMINUM HYDROXIDE, MAGNESIUM HYDROXIDE, AND SIMETHICONE 30 ML: 200; 200; 20 SUSPENSION ORAL at 21:21

## 2025-04-28 RX ADMIN — METHOCARBAMOL 1000 MG: 500 TABLET ORAL at 21:19

## 2025-04-28 RX ADMIN — SEVELAMER HYDROCHLORIDE 1600 MG: 800 TABLET ORAL at 17:27

## 2025-04-28 RX ADMIN — NIFEDIPINE 30 MG: 30 TABLET, FILM COATED, EXTENDED RELEASE ORAL at 17:27

## 2025-04-28 RX ADMIN — Medication 6 MG: at 21:19

## 2025-04-28 RX ADMIN — CALCIUM CARBONATE (ANTACID) CHEW TAB 500 MG 500 MG: 500 CHEW TAB at 11:55

## 2025-04-28 NOTE — PROGRESS NOTES
Progress Note - PMR   Name: Naresh Carpio 64 y.o. male I MRN: [id number]  Unit/Bed#: -01 I Date of Admission: 4/11/2025   Date of Service: 4/28/2025 I Hospital Day: 17     Assessment & Plan  Traumatic retroperitoneal hematoma  - Sustained from mechanical fall several days prior to initial presentation on 4/2/25, presenting with progressively worsening low back pain. Trauma imaging revealed a large retroperitoneal hematoma with active extravasation and hemoglobin drop. Now s/p transfusion, Kcentra and ultimately IR embolization of the right deep circumflex iliac artery and superior gluteal artery with Dr. Garcia on 4/3/25. Unfortunately, on 4/6/25, his hemoglobin decreased and repeat CT showed a 5 mm inferior gluteal artery branch pseudoaneurysm and he is s/p IR pelvic angiogram on 4/7/25 with Dr. Hernandez, which demonstrated pseudoaneurysms at the distal branches of the left inferior gluteal artery which was embolized.   > 4/15: Hgb 7.2 today (from 7.6 yesterday). Back pain seems controlled. Will be receiving increased Epogen today per IM/Nephro  > Hgb 8.4 on 4/22 labs    - Continue to trend Hgb on CBC closely  - Consider repeat imaging if Hgb continues to downtrend or pain significantly worsens  - Contact IR if there are additional bleeding concerns given multiple prior interventions described above  - Local right hip/groin access site incision care  - Analgesia, see below  - PT and OT  Acute pain due to trauma  - Evaluated by APS on acute care  - Tylenol 975 mg q8h scheduled  - Gabapentin 100 mg three times weekly  - Methocarbamol 1000 mg q8h scheduled  - Lidocaine patches daily PRN  - Oxycodone 5 or 10 mg q4h PRN, wean as possible  Impaired mobility and activities of daily living  - Rehabilitation medicine physician for daily monitoring of care, 24 hour availability for acute medical issues, medication management, and therapeutic and diagnostic assessments.  - 24 hour rehabilitation nursing 7 days per week  for: management/teaching of medications, bowel/bladder routine, skin care.  - PT, OT for 2-3 hours per day, 5 to 6 days per week; 15 hours per week  - Rehabilitation Psychology as needed for adjustment and coping  - CM for barriers to discharge, community resources, and family support  - Discharge planning following to help ensure a safe and efficient discharge  - 900/7 program due to HD status, pain, expected therapy tolerance  4/18- Ongoing dispo planning with patient/sister and CM. Staff expressed that patient is progressing well from a functional standpoint but continues to have difficulties with ADLs/iADLs due to impaired vision. Will continue to work towards goals for safe dischare  4/22- DC date now pending outpatient HD setup    L2 vertebral fracture (HCC)  > Sustained from a mechanical fall on 3/24/25, found to have an acute, obliquely oriented fracture of the L2 vertebral body with distraction of the dominant fracture fragment and involvement of the anterior and posterior cortices and superior endplate  > At that time, managed non-operatively with LSO bracing, though it does not appear orthopedic spine surgery or neurosurgery evaluated at the time of injury    - Maintain LSO brace with lumbar spinal precautions   - Should follow-up with spine surgery outpatient  Closed fracture of proximal end of left humerus with routine healing  - Sustained from fall in early February, per CT of the LUE on 2/9/25, there was a minimally displaced fracture of the anterior facet of the greater tuberosity of the left humerus, minimally apparent on repeat x-ray of the shoulder on 3/24/25  - He was evaluated by orthopedic surgery at the time of the initial injury and was recommended to maintain NWB through the LUE with a sling  - There is no further orthopedic evaluation documentation and it does not appear that further follow-up occurred.  - Clinically, he is not describing ongoing significant left arm or shoulder pain and  "has been weight-bearing through the LUE  > 4/14: Repeat humerus X-ray done yesterday shows no change in fracture alignment. Ortho consulted.  >4/15: Ortho c/s: May WBAT to LUE. PT/OT to increase strength and ROM. F/u with Dr. Cisneros in 4 weeks    - Monitor  - PT/OT  Insomnia  Patient reports he has an overall a poor sleep hygiene: he sleeps at 1-2am nightly and watches TV up until bed.  Advised patient to attempt to regulate sleep/wake cycle while here to better participate in therapy. Attempt to try to sleep earlier around midnight  >4/15: reviewed sleep log: Pt went to sleep around midnight and slept for 6 hours.  > 4/17- Poor sleep last night: was awake every other hour. Requiring nap during the day. Can increase melatonin tomorrow if this persists  > 4/18: Poor sleep continues. Pt endorses moving from bed to chair multiple times throughout the night which is his baseline at home too    - Continue sleep log  - Continue melatonin 6mg nightly  At risk for constipation   4/15- Adjusted bowel medications: Colace 100mg BID, Senna 17.2mg with lunch and PRN Miralax/Suppos  4/17- No BM since 4/13. Lactulose this evening if no BM after lunch. Can consider Relistor if no success for opioid-inducted constipation  4/18- small hard BMs x2 yesterday after lactulose and suppository. Continue bowel regimen  >BM 4/22 but required Relistor and suppository/lactulose  > Last BM 4/25    - Will follow BMs and adjust bowel regimen to target soft, formed stools q1-2 days, per pt's regular schedule.  Anemia due to chronic kidney disease, on chronic dialysis (HCC)  > 4/14: Hgb 7.6  > 4/15: Hgb 7.2 - Discussed with IM who is coordinating with Nephrology; they will increase his Epoetin jessica  > 4/17: Hgb 7.7  > 4/22: Hgb 8.4    - Trend Hgb on CBC  - Epoetin jessica with dialysis  - Transfusions per IM, appreciate their recommendations  Visual disturbance  - Reporting \"floater\" within the right eye with acute onset on 4/11/25 at mid-day while " "watching TV; describes a dark, \"spider\"-shaped floater that occupies a significant portion of the visual field, painless; has similar floaters in the left eye but smaller  - Ophthalmology consulted on [facility] admission by IM, per prior trauma surgery documentation, Dr. Chaudhry of ophthalmology was made personally aware of clinical situation on 4/11/25 prior to [facility] admission  > Ophthalmology consult 4/12: Exam c/w proliferative diabetic retinopathy with vitreous hemorrhage. Pt will follow up with ophtho immediately after discharge and for referral to a retina specialist  > 4/15- Pt thinks his R eye floater may be larger today, unable to clearly state or quantify symptoms. No other visual complaints of increased number of floaters, double/blurry vision, or bright flashes. Advised patient to inform us if symptoms change - will reach out to Ophthalmology accordingly  > 4/18- No changes noted in vision/floaters. His overall impaired vision continues to be a barrier for his ADLs/iADLs  > 4/20-24- Stable R eye floater    - Monitor for changes in vision  Diabetic ulcer of left midfoot associated with type 2 diabetes mellitus, limited to breakdown of skin (HCC)  - Has chronic wounds affecting the left foot plantar surface, left foot 1st and 2nd digits  - Left Plantar Foot Wounds and Left toes:  Cleanse with NSS and pat dry. Paint all wounds with Betadine daily and cover plantar open wound with silicone bordered foam dressing. Naren with T for Treatment and change every other day or PRN   > 4/22 : Wound care recs: Paint left foot 1st and 2nd digits with betadine dailyand covering plantar open wound with foam. Podiatry consulted   > 4/22: Podiatry did bedside debridement of left foot plantar ulcer. Continue with local wound care, no surgical intervention needed. SALVATORE ordered and vascular surgery consulted. Pt to maintain frequent LLE elevation, strict pressure and fracture reduction. WBAT to LLE  > 4/24: Pictures of LLE reviewed on " chart. Wounds are stable with no signs of infection. XR L foot 4/22 with no evidence of OM. ABIs and toe pressures are within healing range. Pt to continue to follow up with wound care for further management  PAD (peripheral artery disease) ([facility])  > Non invasive arterial studies from 3/28:  Diffuse disease noted throughout the femoral-popliteal arteries. Evidence of 50-75% stenosis in the proximal popliteal artery. Evidence of 50-75% stenosis at the proximal tibio-peroneal trunk.   LLE: Diffuse disease noted throughout the femoral-popliteal arteries. Evidence of >75% stenosis in the proximal popliteal artery.  Tech Note: Patient refused SALVATORE.  SALVATORE 4/22: Left lower limb SALVATORE noncompressible, metatarsal pressure 155 mmHg, great toe pressure 72 mmHg. Right lower limb noncompressible SALVATORE, metatarsal pressure 158 mmHg, second toe pressure 101 mmHg   Vascular surgery consult pending  Hyponatremia  > 4/14: Na 129 - A&O x3, pt mentating appropriately  > 4/15: Na 125 - Discussed with IM who is coordinating with Nephrology; they will manage Na during HD   > Sodium levels continue to fluctuate from 125-131. Pt remains A&O x3. Nephrology will continue to manage electrolyte deficiencies    - Trend on BMP  - Electrolyte management per nephrology  - Continue HD in setting of ESRD  ESRD (end stage renal disease) ([facility])  - Continue HD  - Nephrology consulted and following, appreciate their recommendations  >4/22: new outpatient HD center needed close to his home after discharge. CM to follow up. DC pushed  Paroxysmal atrial fibrillation (HCC)  - Currently rate controlled  - Embolic stroke risk reduction with apixaban 5 mg BID    >4/22- Refusing it this morning. Extensive conversation had with patient to discuss the importance of compliance given his high stroke risk.   > 4/24- now compliant with Eliquis  Chronic diastolic (congestive) heart failure (HCC)  Wt Readings from Last 3 Encounters:   04/28/25 94.3 kg (207 lb 14.3 oz)    04/11/25 98.5 kg (217 lb 2.5 oz)   03/24/25 107 kg (234 lb 12.6 oz)     - Most recent TTE from 10/9/24 with EF 60-65%, grade 1 diastolic dysfunction  - Volume management with HD per nephrology  - Monitor weights  - Appreciate medicine and nephrology recommendations and management  Primary hypertension  - Nifedipine  - Appreciate IM and nephrology management  Gout  - Allopurinol for flare prophylaxis  Hyperphosphatemia  > 4/15: Phos 4.6  - Management per nephrology  - Continue phosphate binders  Wound of skin  - Has right anterior pre-tibial scab, wash gently daily, leave open to air  - Wound care RN consulted and following  At high risk for skin breakdown  > 4/21- New sacral pressure injury noted by staff. Pictures reviewed on chart. Patient primarily sleeps in recliner (both here and at home). Discussed with therapy and pt will use a cushion that offers sacral relief on recliner. Will continue to monitor  >4/22- Wound care recs:  Cleanse wound with NSS, pat dry. Apply calcium alginate with silver to wound bed and cover with foam. Change every other day and prn. Cushion will not be covered per therapy for home - advised patient to find one off [employer] to continue offloading     - Moisturize skin daily with skin nourishing cream  - Ehob cushion in chair when out of bed.  - Preventative hydraguard to bilateral heels BID and PRN.   - Calazime paste to sacrum/buttocks BID and PRN  - Monitor clinically for breakdown, frequent turns  - Wound care RN consulted and following  At risk for venous thromboembolism (VTE)  - Apixaban, see above  Secondary hyperparathyroidism (HCC)      Subjective   Patient is a 64-year-old male with history of chronic anemia, atrial fibrillation, diabetes, end-stage renal disease on hemodialysis presenting on 4/2 with progressively worsening back pain after a fall found to have left-sided right show peritoneal hematoma with active extravasation status post Kcentra stabilization with transfusions  and multiple IR embolizations. Course complicated by acute on chronic pain     Chief Complaint: f/u ambulatory dysfunction and medication compliance    Interval: Patient seen and evaluated in room without acute issues overnight however in the weekend did have periodic episodes of bleeding from his wounds on his foot which he was concerned about and then declined taking his Eliquis.  He discussed with him the overall risks of not taking it.  He has been followed by podiatry and wound care was vascular for his wounds.  Definitely there is healing potential per the vascular consultation.  Continue to work with case management patient in order to try and find the best options for discharge given his dialysis needs.  Patient however still participating in therapy and denies fever, chills, nausea, emesis, cough, shortness of breath, diarrhea, or constipation. Sleep was poor as has been, mood stable. Pain is controlled at this time.      Objective :  Temp:  [97.7 °F (36.5 °C)-99.1 °F (37.3 °C)] 97.7 °F (36.5 °C)  HR:  [62-67] 67  BP: (153-159)/(57-68) 156/68  Resp:  [16-18] 16  SpO2:  [97 %-99 %] 99 %  O2 Device: None (Room air)    Functional Update:  Physical Therapy Occupational Therapy Speech Therapy   Weight Bearing Status: Full Weight Bearing  Transfers: Supervision  Bed Mobility: Supervision  Amulation Distance (ft): 200 feet (to 350 feet)  Ambulation: Supervision (DS)  Assistive Device for Ambulation: Roller Walker  Number of Stairs: 8  Assistive Device for Stairs: Bilateral Hand Rails  Stair Assistance: Supervision  Discharge Recommendations: Home with:  DC Home with:: Family Support, First Floor Setup, Home Physical Therapy   Eating: Independent  Grooming: Supervision  Bathing: Minimal Assistance  Bathing: Minimal Assistance  Upper Body Dressing: Supervision  Lower Body Dressing: Minimal Assistance  Toileting: Supervision  Tub/Shower Transfer: Supervision  Toilet Transfer: Supervision  Cognition: Exceptions to  WNL  Cognition: Decreased Comprehension  Orientation: Person, Place, Time, Situation               Physical Exam  Vitals reviewed.   Constitutional:       General: He is not in acute distress.  HENT:      Head: Normocephalic and atraumatic.      Right Ear: External ear normal.      Left Ear: External ear normal.      Nose: Nose normal. No rhinorrhea.      Mouth/Throat:      Mouth: Mucous membranes are moist.      Pharynx: Oropharynx is clear.   Eyes:      General: No scleral icterus.  Cardiovascular:      Rate and Rhythm: Normal rate.      Pulses: Normal pulses.   Pulmonary:      Effort: Pulmonary effort is normal. No respiratory distress.   Abdominal:      General: There is no distension.      Palpations: Abdomen is soft.   Musculoskeletal:      Right lower leg: No edema.      Left lower leg: No edema.   Skin:     General: Skin is warm and dry.   Neurological:      Mental Status: He is alert and oriented to person, place, and time.      Sensory: Sensory deficit present.      Comments: MMT not completed today   Psychiatric:         Mood and Affect: Mood normal.         Behavior: Behavior normal.             Scheduled Meds:  Current Facility-Administered Medications   Medication Dose Route Frequency Provider Last Rate    acetaminophen  975 mg Oral Q8H [facility] Damian Cerda MD      allopurinol  100 mg Oral Daily Damian Cerda MD      aluminum-magnesium hydroxide-simethicone  30 mL Oral Q4H PRN GRANT Church      apixaban  5 mg Oral BID Damian Cerda MD      atorvastatin  80 mg Oral Daily With Dinner Damian Cerda MD      bisacodyl  10 mg Rectal Daily PRN Michelle Landa, DO      calcium carbonate  500 mg Oral Daily PRN GRANT Church      docusate sodium  100 mg Oral BID Michelleaura Landa, DO      epoetin jessica  8,000 Units Intravenous After Dialysis German Linton MD      gabapentin  100 mg Oral Once per day on Monday Wednesday Friday Damian Cerda MD      lidocaine  1 patch Topical Daily PRN Damian Cerda MD       melatonin  6 mg Oral HS Jayden Rowland MD      methocarbamol  1,000 mg Oral Q8H [facility] Jayden Rowland MD      naloxone  0.04 mg Intravenous Q1MIN PRN Damian Cerda MD      NIFEdipine  30 mg Oral After Dinner Damian Cerda MD      ondansetron  4 mg Oral Q6H PRN Gino Austni PA-C      oxyCODONE  5 mg Oral Q4H PRN Damian Cerda MD      Or    oxyCODONE  10 mg Oral Q4H PRN Damian Cerda MD      oxyCODONE  2.5 mg Oral Q8H PRN Jayden Rowland MD      polyethylene glycol  17 g Oral Daily PRN Michelle Landa,       senna  2 tablet Oral Daily With Lunch Michelle Landa,       sevelamer  1,600 mg Oral TID With Meals Damian Cerda MD      white petrolatum-mineral oil   Topical TID PRN Michelle Landa,            Lab Results: I have reviewed the following results:  Results from last 7 days   Lab Units 04/22/25  1237   HEMOGLOBIN g/dL 8.4*   HEMATOCRIT % 26.4*   WBC Thousand/uL 7.27   PLATELETS Thousands/uL 327     Results from last 7 days   Lab Units 04/22/25  1237   BUN mg/dL 48*   SODIUM mmol/L 129*   POTASSIUM mmol/L 4.3   CHLORIDE mmol/L 89*   CREATININE mg/dL 7.18*                Santos Guan DO  Physical Medicine and Rehabilitation  [facility]    I have spent a total time of 35 minutes in caring for this patient on the day of the visit/encounter including Counseling / Coordination of care, Documenting in the medical record, and Communicating with other healthcare professionals .

## 2025-04-28 NOTE — NURSING NOTE
"Pt refusing his evening dose of eliquis stating \" you don't need to keep offering it to me I am not going to take it.\"  "

## 2025-04-28 NOTE — ASSESSMENT & PLAN NOTE
"- Reporting \"floater\" within the right eye with acute onset on 4/11/25 at mid-day while watching TV; describes a dark, \"spider\"-shaped floater that occupies a significant portion of the visual field, painless; has similar floaters in the left eye but smaller  - Ophthalmology consulted on [facility] admission by IM, per prior trauma surgery documentation, Dr. Chaudhry of ophthalmology was made personally aware of clinical situation on 4/11/25 prior to [facility] admission  > Ophthalmology consult 4/12: Exam c/w proliferative diabetic retinopathy with vitreous hemorrhage. Pt will follow up with ophtho immediately after discharge and for referral to a retina specialist  > 4/15- Pt thinks his R eye floater may be larger today, unable to clearly state or quantify symptoms. No other visual complaints of increased number of floaters, double/blurry vision, or bright flashes. Advised patient to inform us if symptoms change - will reach out to Ophthalmology accordingly  > 4/18- No changes noted in vision/floaters. His overall impaired vision continues to be a barrier for his ADLs/iADLs  > 4/20-24- Stable R eye floater    - Monitor for changes in vision  "

## 2025-04-28 NOTE — ASSESSMENT & PLAN NOTE
"On no rate control medications  Examines RRR  Anticoagulation with Eliquis as at home but he doesn't want to take it because he is worried it will make his right eye vision worse.  I d/w on call Optho Dr Diaz = \"The risk is not quantified in the literature. Dr edmond's note suggests a diagnosis of proliferative diabetic retinopathy and bilateral and recurrent vitreous hemorrhage. So he is at risk for - and has had hemorrhage - whether or not he is on eliquis (assuming dr edmond is correct).     I would always recommend life and death are more important so to do what you recommend re his systemic condition, and then to follow up with dr edmond as he recommended so a real exam can be done in the clinic\".   Explained all this to him and he understands  He had been taking the Eliquis again and then refused it this AM 4/28 2/2 his foot bleeding.  "

## 2025-04-28 NOTE — PROGRESS NOTES
"OT daily tx note     04/28/25 1115   Pain Assessment   Pain Assessment Tool 0-10   Pain Score 7   Pain Location/Orientation Orientation: Lower;Location: Back   Hospital Pain Intervention(s) Rest;Emotional support   Restrictions/Precautions   Precautions Cognitive;Fall Risk;Fluid restriction;Pain;Spinal precautions;Visual deficit   Braces or Orthoses LSO   Lifestyle   Autonomy \"I honestly just want to go home already\"   Eating   Type of Assistance Needed Independent   Physical Assistance Level No physical assistance   Comment seated in recliner; c/o heartburn during session w/ TUMS provided but inhibited success w/ therapy   Eating CARE Score 6   Oral Hygiene   Type of Assistance Needed Independent   Physical Assistance Level No physical assistance   Comment in stance at sink w/ RW   Oral Hygiene CARE Score 6   Shower/Bathe Self   Type of Assistance Needed Set-up / clean-up   Physical Assistance Level No physical assistance   Comment SB completed seated in recliner using wipes as pt refuses to use bucket/soap; cont frustration when asked to get washed but able to complete 8/10 body parts w/ setup. pt cannot wash feet due to spinal prec and cannot cross legs but offered LHS/[place]ah. did not want to trial today bc pt c/o pain/heartburn but open to trialing tmrw. he also states he does not want to wash feet due to bleeding from earlier. pt also req to shave during session tmrw   Shower/Bathe Self CARE Score 5   Upper Body Dressing   Type of Assistance Needed Independent   Physical Assistance Level No physical assistance   Comment seated for LSO brace mgmt   Upper Body Dressing CARE Score 6   Lower Body Dressing   Type of Assistance Needed Supervision   Physical Assistance Level No physical assistance   Comment Extensive time dedicated to discussion about OT needing to assess LB dressing as he has refused LHAE and now has spinal prec so cannot complete the way he did PTA. OT offering solutions such as using bed to prop up " leg to A w/ threading as this is the most sig issue, not cm over hips. Pt did not want ot utilize bed but open to trialing LHR during session completing at CS w/ VC to prevent forward bend. OT explained that they will need to cont to assess until ind and rec needed equipment/tech to complete safely. Pt seemed more understanding during session today and states he will cooperate tmrw for ADL again. However pt cont to be inconsistent w/ responses and low frustration tolerance.   Lower Body Dressing CARE Score 4   Putting On/Taking Off Footwear   Comment Extensive convo also regarding sock/shoe mgmt. Pt explained propping up legs on bed but refusing to demo as he was having pain/bleeding from feet and just does not want to do today. OT again explaining that it needs to be done so we have confidence he can complete ind and safely. Pt cont to refuse stating he will do it tmrw. Will f/u tmrw. Also pt reporting that he would more likely wear shoes vs socks but again, need to assess   Reason if not Attempted Refused to perform   Putting On/Taking Off Footwear CARE Score 7   Sit to Stand   Type of Assistance Needed Independent   Physical Assistance Level No physical assistance   Comment w/ RW   Sit to Stand CARE Score 6   Bed-Chair Transfer   Type of Assistance Needed Independent   Physical Assistance Level No physical assistance   Comment w/ RW   Chair/Bed-to-Chair Transfer CARE Score 6   Cognition   Overall Cognitive Status Impaired   Arousal/Participation Cooperative   Attention Attends with cues to redirect   Orientation Level Oriented to person;Oriented to place   Memory Decreased recall of precautions   Following Commands Follows one step commands without difficulty   Activity Tolerance   Activity Tolerance Patient limited by fatigue;Patient limited by pain   Assessment   Treatment Assessment Pt engaged in skilled OT session with focus on ADL Retraining , LB Dressing, UB dressing,  LHAE education/training, Standing  tolerance, Standing balance , Family training/education, Energy conservation training/education, healthy coping education, Leisure and social pursuits, and community re-integration. Pt is limited by weakness, impaired balance, decreased endurance, increased fall risk, decreased ADLS, decreased IADLS, pain, and decreased activity tolerance. Extensive discussion about ADL covered during session explaining that all aspects of ADL need to be completed prior to DC in order to assess ind level and make rec such as LHAE or compensatory tech. Pt agreeable today to certain things but c/o pain, heartburn, issues w/ feet. Pt also very fatigued and falling asleep mid conversation at times. Will cont to work on ADLs and other solutions to facilitate ind for home. OT services are warranted to address above barriers.   Prognosis Good   Problem List Decreased strength;Decreased endurance;Impaired balance;Decreased mobility;Orthopedic restrictions;Pain;Impaired vision   Barriers to Discharge Inaccessible home environment;Decreased caregiver support   Plan   Treatment/Interventions ADL retraining;Functional transfer training;Endurance training;Therapeutic exercise   Progress Progressing toward goals   OT Therapy Minutes   OT Time In 1115   OT Time Out 1215   OT Total Time (minutes) 60   OT Mode of treatment - Individual (minutes) 60   OT Mode of treatment - Concurrent (minutes) 0   OT Mode of treatment - Group (minutes) 0   OT Mode of treatment - Co-treat (minutes) 0   OT Mode of Treatment - Total time(minutes) 60 minutes   OT Cumulative Minutes 1130   Therapy Time missed   Time missed? No

## 2025-04-28 NOTE — PROGRESS NOTES
Progress Note - Nephrology   Name: Naresh Carpio 64 y.o. male I MRN: [id number]  Unit/Bed#: -01 I Date of Admission: 4/11/2025   Date of Service: 4/28/2025 I Hospital Day: 17     Assessment & Plan  ESRD (end stage renal disease) ([facility])  #ESRD on HD TTS:  Dialysis unit/days: [facility]  Access: PermCath  Last hemodialysis was on April 26  Patient educated on completing full treatments to prevent worsening fluid overload.  Patient had cut his Saturday's treatment short by 40 minutes  Plan for next dialysis tomorrow April 29th  Primary hypertension  Volume mediated  Blood pressure initially had been low few days ago and then normotensive currently running on the higher side but overall stable  UF on HD keep him at his dry weight  Low-sodium diet   Nifedipine 30 mg daily   Continue ultrafiltration with dialysis.  Anemia due to chronic kidney disease, on chronic dialysis ([facility])  Continue SURINDER  Hemoglobin at 8.4 and stable    Traumatic retroperitoneal hematoma  S/p IR embolization  Hemoglobin stable  Secondary hyperparathyroidism ([facility])  Sevelamer with meals 3 times daily   Continue renal diet    PAD (peripheral artery disease) ([facility])      I have reviewed the nephrology recommendations including assessment and plan, with HD nurse, and we are in agreement with renal plan including the information outlined above. Nephrology service will follow.    Subjective   Brief History of Admission -64-year-old male with a history of end-stage renal disease currently in the rehabilitation unit after being treated for traumatic retroperitoneal hemorrhage.    Overnight events.  Last dialysis was on Saturday which he reduced the time by 40 minutes.    Objective :  Temp:  [98.1 °F (36.7 °C)-99.1 °F (37.3 °C)] 98.1 °F (36.7 °C)  HR:  [62-74] 64  BP: (140-159)/(54-66) 159/57  Resp:  [16-18] 16  SpO2:  [97 %-99 %] 97 %  O2 Device: None (Room air)    Current Weight: Weight - Scale: 94.4 kg (208 lb 1.8 oz)  First Weight: Weight - Scale: 98.9 kg  (218 lb)  I/O         04/26 0701  04/27 0700 04/27 0701  04/28 0700 04/28 0701  04/29 0700    P.O. 684 420 180    I.V. (mL/kg) 500 (5.3)      Total Intake(mL/kg) 1184 (12.5) 420 (4.4) 180 (1.9)    Urine (mL/kg/hr)  100 (0)     Other 2006      Total Output 2006 100     Net -822 +320 +180                 Physical Exam  Vitals and nursing note reviewed.   Constitutional:       General: He is not in acute distress.     Appearance: He is well-developed. He is not diaphoretic.   HENT:      Head: Normocephalic and atraumatic.   Eyes:      General: No scleral icterus.     Pupils: Pupils are equal, round, and reactive to light.   Cardiovascular:      Rate and Rhythm: Normal rate and regular rhythm.      Heart sounds: Normal heart sounds. No murmur heard.     No friction rub. No gallop.   Pulmonary:      Effort: Pulmonary effort is normal. No respiratory distress.      Breath sounds: Normal breath sounds. No wheezing or rales.   Chest:      Chest wall: No tenderness.   Abdominal:      General: Bowel sounds are normal. There is no distension.      Palpations: Abdomen is soft.      Tenderness: There is no abdominal tenderness. There is no rebound.   Musculoskeletal:         General: Normal range of motion.      Cervical back: Normal range of motion and neck supple.      Right lower leg: Edema present.      Left lower leg: Edema present.   Skin:     Findings: No rash.   Neurological:      Mental Status: He is alert and oriented to person, place, and time.         Medications:    Current Facility-Administered Medications:     acetaminophen (TYLENOL) tablet 975 mg, 975 mg, Oral, Q8H KEMAL, Damian Cerda MD, 975 mg at 04/28/25 0509    allopurinol (ZYLOPRIM) tablet 100 mg, 100 mg, Oral, Daily, Damian Cerda MD, 100 mg at 04/28/25 0814    aluminum-magnesium hydroxide-simethicone (MAALOX) oral suspension 30 mL, 30 mL, Oral, Q4H PRN, GRANT Church, 30 mL at 04/24/25 1351    apixaban (ELIQUIS) tablet 5 mg, 5 mg, Oral, BID, Damian  MD Prashant, 5 mg at 04/27/25 1700    atorvastatin (LIPITOR) tablet 80 mg, 80 mg, Oral, Daily With Dinner, Damian Cerda MD, 80 mg at 04/27/25 1636    bisacodyl (DULCOLAX) rectal suppository 10 mg, 10 mg, Rectal, Daily PRN, Michelle T Landa, DO, 10 mg at 04/18/25 0000    calcium carbonate (TUMS) chewable tablet 500 mg, 500 mg, Oral, Daily PRN, GRANT Church, 500 mg at 04/27/25 1700    docusate sodium (COLACE) capsule 100 mg, 100 mg, Oral, BID, Michelle T Landa, DO, 100 mg at 04/28/25 0814    epoetin jessica (EPOGEN,PROCRIT) injection 8,000 Units, 8,000 Units, Intravenous, After Dialysis, German Linton MD, 8,000 Units at 04/26/25 0850    gabapentin (NEURONTIN) capsule 100 mg, 100 mg, Oral, Once per day on Monday Wednesday Friday, Damian Cerda MD, 100 mg at 04/28/25 0814    lidocaine (LIDODERM) 5 % patch 1 patch, 1 patch, Topical, Daily PRN, Damian Cerda MD, 1 patch at 04/18/25 2135    melatonin tablet 6 mg, 6 mg, Oral, HS, Jayden Rowland MD, 6 mg at 04/27/25 2113    methocarbamol (ROBAXIN) tablet 1,000 mg, 1,000 mg, Oral, Q8H KEMAL, Jayden Rowland MD, 1,000 mg at 04/28/25 0509    naloxone (NARCAN) 0.04 mg/mL syringe 0.04 mg, 0.04 mg, Intravenous, Q1MIN PRN, Damian Cerda MD    NIFEdipine (PROCARDIA XL) 24 hr tablet 30 mg, 30 mg, Oral, After Dinner, Damian Cerda MD, 30 mg at 04/27/25 1700    ondansetron (ZOFRAN-ODT) dispersible tablet 4 mg, 4 mg, Oral, Q6H PRN, Gino Austin PA-C, 4 mg at 04/20/25 0023    oxyCODONE (ROXICODONE) IR tablet 5 mg, 5 mg, Oral, Q4H PRN, 5 mg at 04/22/25 2006 **OR** oxyCODONE (ROXICODONE) immediate release tablet 10 mg, 10 mg, Oral, Q4H PRN, Damian Cerda MD, 10 mg at 04/28/25 0428    oxyCODONE (ROXICODONE) split tablet 2.5 mg, 2.5 mg, Oral, Q8H PRN, Jayden Rowland MD, 2.5 mg at 04/25/25 0034    polyethylene glycol (MIRALAX) packet 17 g, 17 g, Oral, Daily PRN, Michelle Landa DO, 17 g at 04/26/25 1259    senna (SENOKOT) tablet 17.2 mg, 2 tablet, Oral, Daily With Lunch,  "Michelle Landa DO, 17.2 mg at 04/27/25 1247    sevelamer (RENAGEL) tablet 1,600 mg, 1,600 mg, Oral, TID With Meals, Damian Cerda MD, 1,600 mg at 04/28/25 0813    white petrolatum-mineral oil (EUCERIN,HYDROCERIN) cream, , Topical, TID PRN, Michelle Landa DO      Lab Results: I have reviewed the following results:  Results from last 7 days   Lab Units 04/22/25  1237   WBC Thousand/uL 7.27   HEMOGLOBIN g/dL 8.4*   HEMATOCRIT % 26.4*   PLATELETS Thousands/uL 327   POTASSIUM mmol/L 4.3   CHLORIDE mmol/L 89*   CO2 mmol/L 29   BUN mg/dL 48*   CREATININE mg/dL 7.18*   CALCIUM mg/dL 8.9       Administrative Statements   I have spent a total time of 10 minutes in caring for this patient on the day of the visit/encounter including Documenting in the medical record, Reviewing/placing orders in the medical record (including tests, medications, and/or procedures), Obtaining or reviewing history  , and Communicating with other healthcare professionals .  Portions of the record may have been created with voice recognition software. Occasional wrong word or \"sound a like\" substitutions may have occurred due to the inherent limitations of voice recognition software. Read the chart carefully and recognize, using context, where substitutions have occurred.If you have any questions, please contact the dictating provider.  "

## 2025-04-28 NOTE — PROGRESS NOTES
"   04/28/25 1400   Pain Assessment   Pain Assessment Tool 0-10   Pain Score 9   Pain Location/Orientation Orientation: Bilateral;Orientation: Lower;Location: Back   Pain Radiating Towards to both sides   Pain Onset/Description Onset: Ongoing;Descriptor: Aching   Effect of Pain on Daily Activities difficult to get going when starting an activity   Patient's Stated Pain Goal No pain   Hospital Pain Intervention(s) Repositioned;Ambulation/increased activity;Rest;Other (Comment)  (notifed RN Maureen and she provided medication; at end of session pt reported pain had improved to a 6/10.)   Restrictions/Precautions   Precautions Cognitive;Fall Risk;Pain;Spinal precautions;Visual deficit   Weight Bearing Restrictions No   ROM Restrictions Yes  (spinal precuations)   Braces or Orthoses LSO   General   Change In Medical/Functional Status IRP w/ RW   Cognition   Overall Cognitive Status Impaired   Arousal/Participation Cooperative   Attention Attends with cues to redirect   Subjective   Subjective \"I'm not doing anything\"   Sit to Stand   Type of Assistance Needed Independent;Adaptive equipment   Comment to RW   Sit to Stand CARE Score 6   Bed-Chair Transfer   Type of Assistance Needed Independent;Adaptive equipment   Comment w/ RW   Chair/Bed-to-Chair Transfer CARE Score 6   Transfer Bed/Chair/Wheelchair   Limitations Noted In Balance;LE Strength;Vision   Adaptive Equipment Roller Walker   Car Transfer   Comment When asked during session if we could practice getting in and out of the car pt stated, \"I'm never doing that here, I won't ever do it\"   Reason if not Attempted Refused to perform   Car Transfer CARE Score 7   Walk 10 Feet   Type of Assistance Needed Independent;Adaptive equipment   Comment RW   Walk 10 Feet CARE Score 6   Walk 50 Feet with Two Turns   Type of Assistance Needed Set-up / clean-up;Adaptive equipment   Comment RW   Walk 50 Feet with Two Turns CARE Score 5   Walk 150 Feet   Type of Assistance Needed Set-up " "/ clean-up;Adaptive equipment   Comment RW   Walk 150 Feet CARE Score 5   Ambulation   Primary Mode of Locomotion Prior to Admission Walk   Distance Walked (feet) 350 ft  (x2, 150 x1)   Assist Device Roller Walker   Gait Pattern Inconsistant Lisa;Slow Lisa;Decreased foot clearance;Forward Flexion;Wide GARO;Step through;Improper weight shift   Limitations Noted In Balance;Endurance;Device Management;Heel Strike;Speed;Strength   Walk Assist Level Modified Independent;Distant Supervision   Does the patient walk? 2. Yes   Wheelchair mobility   Does the patient use a wheelchair? 0. No   Curb or Single Stair   Style negotiated Single stair  (Trainer steps 6\")   Type of Assistance Needed Supervision   1 Step (Curb) CARE Score 4   4 Steps   Type of Assistance Needed Supervision   4 Steps CARE Score 4   12 Steps   Type of Assistance Needed Supervision   12 Steps CARE Score 4   Stairs   Type Stairs   # of Steps 12   Weight Bearing Precautions Fall Risk   Assist Devices Bilateral Rail   Findings Asked pt to trial FF steps today to practice RW management as he has \"8 steps to get in and out\" of his house. Yesterday he went over visual of how he uses walker, when asked pt said he did not want to do that today. PT expressed we have not done it and this would be beneficial to work on his strength and endurance to get in and out of his house.  Used 6\"  steps non reciprocal pattern ilana ANTONIO eddy on BHR x6   Picking Up Object   Type of Assistance Needed Set-up / clean-up;Adaptive equipment   Comment w reacher   Picking Up Object CARE Score 5   Therapeutic Interventions   Strengthening Seated LAQ, marches, ankle DF/PF 3x10 ea to work on pain management prior to walking during session due to inc LBP   Other Comments   Comments Discussed DME orders for walker tray, reacher, shoe horn, hip kit, wide sock aide that [facility] discussed with him in the past. Pt was very upset with this conversation and when asked if he was able " "to order it he said no. I asked if his ex-wife, sister, or daughter could assist him with this to which he responded \"don't you understand theres money involved?\" PT replied yes, and he said that nothing will get ordered until the second week of June because that is when his ex-wife is getting back from vacation. She is leaving \"in a few days\" PT asked if she would potentially have time to try to help himn order prior to her leaving and pt replied \"no.\" When asked if his daughter could help him he said, \"no she has two young children and doesn't have the money for that.\" When asked if his sister could help him, \"she can't put the money out for that.\" PT asked pt if they could simply assist with ordering from his account/order from their account and he could pay them back and he replied \"there are so many things I need, its not just this stuff and I wouldnt be able to pay them back right away.\" Discussed conversation with [facility] after session and discussed that she would attempt to help him in her session tomorrow to order the DME from his phone/account tomorrow. Pt also reported to PT that he is interested in only the walker tray, shoe horn, and reacher so print outs were given to [facility] to add to his folder of recommended DME.   Assessment   Treatment Assessment Pt participated in skilled PT session after initial refusal. When PT entered the room pt had blanket over his head and said he wouldnt do anything for very long if at all. Pt reported inc LBP (9/10) so PT notified Maureen RAWLS and she was able to give him additional medication. Initial PT interventions to address pain included seated therex which helped with his discomfort. Discussed with pt that performing the STS between sessions to help become less stiff in between sessions may be beneficial in decreasing this pain. During session pt completed repetitive functional task practice to work on ambulation endurance, LE strength, and stair negotiation. DME " discussion noted above along with car transfer conversation and practicing FF of stairs. Pt self limiting in these aspects. Continue per PT POC to improve LE strength, balance, endurance and work on repetitive task practice to continues to progress.   Problem List Decreased strength;Decreased endurance;Impaired balance;Decreased mobility;Orthopedic restrictions;Pain;Impaired vision   Barriers to Discharge Inaccessible home environment;Decreased caregiver support   PT Barriers   Physical Impairment Decreased strength;Decreased range of motion;Decreased endurance;Impaired balance;Decreased mobility;Impaired vision   Functional Limitation Car transfers;Stair negotiation;Standing;Transfers;Walking   Plan   Treatment/Interventions ADL retraining;Functional transfer training;LE strengthening/ROM;Elevations;Therapeutic exercise;Endurance training;Cognitive reorientation;Patient/family training;Equipment eval/education;Bed mobility;Gait training;Compensatory technique education   Progress Progressing toward goals   PT Therapy Minutes   PT Time In 1400   PT Time Out 1500   PT Total Time (minutes) 60   PT Mode of treatment - Individual (minutes) 60   PT Mode of treatment - Concurrent (minutes) 0   PT Mode of treatment - Group (minutes) 0   PT Mode of treatment - Co-treat (minutes) 0   PT Mode of Treatment - Total time(minutes) 60 minutes   PT Cumulative Minutes 1140   Therapy Time missed   Time missed? No

## 2025-04-28 NOTE — ASSESSMENT & PLAN NOTE
Wt Readings from Last 3 Encounters:   04/28/25 94.3 kg (207 lb 14.3 oz)   04/11/25 98.5 kg (217 lb 2.5 oz)   03/24/25 107 kg (234 lb 12.6 oz)     - Most recent TTE from 10/9/24 with EF 60-65%, grade 1 diastolic dysfunction  - Volume management with HD per nephrology  - Monitor weights  - Appreciate medicine and nephrology recommendations and management

## 2025-04-28 NOTE — ASSESSMENT & PLAN NOTE
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   Ortho saw here on 4/14 = WBAT; see Dr Cisneros 4 weeks @ 5/14/25  "

## 2025-04-28 NOTE — ASSESSMENT & PLAN NOTE
Volume mediated  Blood pressure initially had been low few days ago and then normotensive currently running on the higher side but overall stable  UF on HD keep him at his dry weight  Low-sodium diet   Nifedipine 30 mg daily   Continue ultrafiltration with dialysis.

## 2025-04-28 NOTE — ASSESSMENT & PLAN NOTE
Continue local care  Being followed by WC  Last LEADS was 3/28/25 = 50-75% right prox polital and prox tibioperoneal trunk; >75% stenosis in prox popliteal artery on left  Was seen by VS in 12/2024 hospital stay and he declined any intervention at the time  Podiatry saw here on 4/22/25, s/p debridement = no surgical intervention needed at this time  Had new bleeding from foot wound 4/27/25. The bleeding then resolved. Podiatry was made aware.

## 2025-04-28 NOTE — CASE MANAGEMENT
Mssg left for  at [facility] to discuss transportation due to pts residing [place] a separate [place] from dialysis location, awaiting call back, [phone]. 1420 received call back from javier the  at [facility]. Her recommendations were to call [facility] and [employer] and inquire if they will assist [place].  If they are only able to do a  from the dialysis center and take home, pt is going to be encouraged to call [employer], as he provides reasonable rates to dialysis pts'  (charges $7 and $10 one way) or uber/lyft drivers Meaghan at [phone] or Claude at [phone].   Javier confirmed she does not yet see a confirmed chair time for pt. Gabe explained call received on Friday from [facility] stating they only saw openings for  455am and 5am which [facility]  will not be able to provide transport to.

## 2025-04-28 NOTE — NURSING NOTE
"Pt refused his eliquis this AM stating \"Everything bad that has happened to me started when I started taking eliquis.\"  MD aware  "

## 2025-04-28 NOTE — PROGRESS NOTES
"Progress Note - Hospitalist   Name: Naresh Carpio 64 y.o. male I MRN: [id number]  Unit/Bed#: -01 I Date of Admission: 4/11/2025   Date of Service: 4/28/2025 I Hospital Day: 17    Assessment & Plan  Traumatic retroperitoneal hematoma  Admitted to the trauma service after a fall with L2 vertebral body and retroperitoneal hematoma.  S/p IR embolization of distal right deep circumflex iliac artery/distal right superior gluteal artery both of which supplied an area of active extravasation and then pseudoaneurysms present at distal branches of the left inferior gluteal artery. The artery was embolized using coils and gelfoam.   He was type and screened 4/17/25 in case needed blood during HD on that day.  Hgb 4/22 improved to 8.4  Anemia due to chronic kidney disease, on chronic dialysis (Formerly [facility])  Had 5 U PRBCs during hospital stay  Hemoglobin has been running mostly 7.3-7.4.    On 4/14/25 was 7.6; on 4/15/24 was 7.2 ---> Renal increased his Epogen in light of his hemoglobin  On 4/17, hemoglobin had increased to 7.7  4/19/25 Hgb up to 8.1.   CBC Tuesday in HD 4/22/25 = 8.4 and stable  ESRD (end stage renal disease) (Formerly [facility])  Continue Sevelamer TID with meals  Renal following  HD T-TH-Sat while here in rehab  Paroxysmal atrial fibrillation ([facility]ly [facility])  On no rate control medications  Examines RRR  Anticoagulation with Eliquis as at home but he doesn't want to take it because he is worried it will make his right eye vision worse.  I d/w on call Optho Dr Diaz = \"The risk is not quantified in the literature. Dr edmond's note suggests a diagnosis of proliferative diabetic retinopathy and bilateral and recurrent vitreous hemorrhage. So he is at risk for - and has had hemorrhage - whether or not he is on eliquis (assuming dr edmond is correct).     I would always recommend life and death are more important so to do what you recommend re his systemic condition, and then to follow up with dr edmond as he recommended so a real " "exam can be done in the clinic\".   Explained all this to him and he understands  He had been taking the Eliquis again and then refused it this AM 4/28 2/2 his foot bleeding.  Chronic diastolic (congestive) heart failure (HCC)  Some LE edema = chronic/stable  Renal diet  stable  Volume management via HD  Primary hypertension  Continue Procardia XL 30 mg qd  Tx per renal  stable  Visual disturbance  Reported a right eye \"floater\" without associated pain earlier in his hospitalization which has resolved but continued to have blurry central vision.  Ophthalmology saw here in [facility] = visual acuity without correction is 20/400 OU. Dx: Proliferative diabetic retinopathy with vitreous hemorrhage OU as well as cataracts.   Will need outpatient follow up immediately after discharge from rehab  Diabetic ulcer of left midfoot associated with type 2 diabetes mellitus, limited to breakdown of skin (HCC)  Continue local care  Being followed by WC  Last LEADS was 3/28/25 = 50-75% right prox polital and prox tibioperoneal trunk; >75% stenosis in prox popliteal artery on left  Was seen by VS in 12/2024 hospital stay and he declined any intervention at the time  Podiatry saw here on 4/22/25, s/p debridement = no surgical intervention needed at this time  Had new bleeding from foot wound 4/27/25. The bleeding then resolved. Podiatry was made aware.  Hyponatremia  Renal following  Continue fluid restriction 1200 cc per 24 hours  Closed fracture of proximal end of left humerus with routine healing  Occurred from a fall in February and eval'd at the time of the injury  Was to continue NWB/sling and followup with Ortho but there was no followup done  Xray 4/13/25 = \"Unchanged alignment of the chronic greater tuberosity avulsion fracture\".   Ortho saw here on 4/14 = WBAT; see Dr Cisneros 4 weeks @ 5/14/25  Gout  Continue Allopurinol  L2 vertebral fracture (HCC)  CT CAP 4/2: Fracture of L2 vertebral body extending to the superior " endplate  Continue LSO brace  Insomnia  Patient reports not sleeping well at night because he has trouble getting comfortable. He has been trying to sleep in the recliner   PAD (peripheral artery disease) (HCC)  Vascular surgery saw per Podiatry's recommendation = they will see in the office.  They advised start ASA but after the d/w the PA in relation to his vitreous hemorrhages, can hold off on that and d/w him in the office.  This will give him time to see the Retina specialist as OP as about getting tx    The above assessment and plan was reviewed and updated as determined by my evaluation of the patient on 4/28/2025.    History of Present Illness   Patient seen and examined. Patients overnight issues or events were reviewed with nursing staff. New or overnight issues include the following:   No new or overnight issues.  Offers no complaints    Review of Systems   All other systems reviewed and are negative.      Objective :  Temp:  [98.1 °F (36.7 °C)-99.1 °F (37.3 °C)] 98.1 °F (36.7 °C)  HR:  [62-74] 64  BP: (140-159)/(54-66) 159/57  Resp:  [16-18] 16  SpO2:  [97 %-99 %] 97 %  O2 Device: None (Room air)    Invasive Devices       Hemodialysis Catheter  Duration             HD Permanent Double Catheter 607 days                    Physical Exam  General Appearance: no distress, non toxic appearing  HEENT: PERRLA, conjuctiva normal; oropharynx clear; mucous membranes moist   Neck:  Supple, normal ROM  Lungs: CTA, normal respiratory effort, no retractions, expiratory effort normal  CV: regular rate and rhythm; no rubs/murmurs/gallops, PMI normal   ABD: soft; ND/NT; +BS  EXT: + LE edema  Skin: normal turgor, normal texture  Psych: affect normal, mood normal  Neuro: AAO        The above physical exam was reviewed and updated as determined by my evaluation of the patient on 4/28/2025.      Lab Results: I have reviewed the following results:  Results from last 7 days   Lab Units 04/22/25  1237   WBC Thousand/uL 7.27    HEMOGLOBIN g/dL 8.4*   HEMATOCRIT % 26.4*   PLATELETS Thousands/uL 327     Results from last 7 days   Lab Units 04/22/25  1237   SODIUM mmol/L 129*   POTASSIUM mmol/L 4.3   CHLORIDE mmol/L 89*   CO2 mmol/L 29   BUN mg/dL 48*   CREATININE mg/dL 7.18*   CALCIUM mg/dL 8.9                   Imaging Results Review: No pertinent imaging studies reviewed.  Other Study Results Review: No additional pertinent studies reviewed.    Review of Scheduled Meds: Medications  reviewed and reconciled as needed  Current Facility-Administered Medications   Medication Dose Route Frequency Provider Last Rate    acetaminophen  975 mg Oral Q8H [facility] Damian Cerda MD      allopurinol  100 mg Oral Daily Damian Cerda MD      aluminum-magnesium hydroxide-simethicone  30 mL Oral Q4H PRN GRANT Church      apixaban  5 mg Oral BID Damian Cerda MD      atorvastatin  80 mg Oral Daily With Dinner Damian Cerda MD      bisacodyl  10 mg Rectal Daily PRN Michelle T Landa, DO      calcium carbonate  500 mg Oral Daily PRN GRANT Church      docusate sodium  100 mg Oral BID Michelle T Landa, DO      epoetin jessica  8,000 Units Intravenous After Dialysis German Linton MD      gabapentin  100 mg Oral Once per day on Monday Wednesday Friday Damian Cerda MD      lidocaine  1 patch Topical Daily PRN Damian Cerda MD      melatonin  6 mg Oral HS Jayden Rowland MD      methocarbamol  1,000 mg Oral Q8H [facility] Jayden Rowland MD      naloxone  0.04 mg Intravenous Q1MIN PRN Daiman Cerda MD      NIFEdipine  30 mg Oral After Dinner Damian Cerda MD      ondansetron  4 mg Oral Q6H PRN Gino Austin PA-C      oxyCODONE  5 mg Oral Q4H PRN Damian Cerda MD      Or    oxyCODONE  10 mg Oral Q4H PRN Damian Cerda MD      oxyCODONE  2.5 mg Oral Q8H PRN Jayden Rowland MD      polyethylene glycol  17 g Oral Daily PRN Michelle T Landa, DO      senna  2 tablet Oral Daily With Lunch Michelle T Landa, DO      sevelamer  1,600 mg Oral TID With Meals Damian  MD Prashant      white petrolatum-mineral oil   Topical TID PRN Michelle Landa DO         VTE Pharmacologic Prophylaxis: Eliquis  Code Status: Level 1 - Full Code  Current Length of Stay: 17 day(s)    Administrative Statements     ** Please Note:  voice to text software may have been used in the creation of this document. Although proof errors in transcription or interpretation are a potential of such software**

## 2025-04-28 NOTE — ASSESSMENT & PLAN NOTE
#ESRD on HD TTS:  Dialysis unit/days: [facility]  Access: PermCath  Last hemodialysis was on April 26  Patient educated on completing full treatments to prevent worsening fluid overload.  Patient had cut his Saturday's treatment short by 40 minutes  Plan for next dialysis tomorrow April 29th

## 2025-04-28 NOTE — WOUND OSTOMY CARE
Progress Note - Wound   Naresh Carpio 64 y.o. male MRN: [id number]  Unit/Bed#: -01 Encounter: [id number]        Assessment:   Patient is followed by wound care and was reconsulted for R foot bleeding ulcer and R elbow.    Findings:    1- POA Stage III sacro-buttocks: Full thickness wound, Beefy red wound bed, scant serosanguinous drainage. Periwound pale pink/white and slightly macerated. Patient was agreeable to dressing change.     2- Right elbow-Intact adherent tan scab over elbow. No open wound, no drainage appreciated. Pictured below.    3-Right foot, diabetic ulcer:  Partial thickness wound with new traumatic injury.  Irregular shaped beefy red wound bed, friable with moderate amount of active sanguinous drainage.  Periwound edematous, fragile and purple. Patient reports trauma to the area when bumping it.     Patient with dry eschar on feet. Podiatry signed off. Recommend continuing with Betadine.     No induration, fluctuance, odor, warmth/temperature differences, redness, or purulence noted to the above noted wounds and skin areas assessed. New dressings applied per orders listed below. Patient tolerated well- no s/s of non-verbal pain or discomfort observed during the encounter. Bedside nurse aware of plan of care. See flow sheets for more detailed assessment findings.      Orders listed below and wound care will continue to follow, call or Secure Chat Message with questions.     Skin Care Plan:  1-Sacrum-buttocks: Cleanse wound with NSS, pat dry. Apply calcium alginate with silver to wound bed and cover with foam. Change every other day and prn.   2-Right medial foot: Cleanse with NSS. Apply Xeroform to wound bed and cover with DSD. Change daily and prn.   3-Turn/reposition q2h or when medically stable for pressure re-distribution on skin .  4-Elevate heels to offload pressure  5-Moisturize skin daily with skin nourishing cream  6-Ehob cushion in chair when out of bed.  7-Preventative Hydraguard  to buttocks and bilateral heels BID and PRN.    WOUNDS:      Wound 03/24/25 Diabetic Ulcer Foot Right;Medial (Active)   Wound Image   04/28/25 0810   Wound Description Bleeding; edema; fragile 04/27/25 2300   Wound Length (cm) 0.5 cm 04/28/25 0810   Wound Width (cm) 1.5 cm 04/28/25 0810   Wound Depth (cm) 0 cm 04/03/25 1400   Wound Surface Area (cm^2) 0.75 cm^2 04/28/25 0810   Drainage Amount Moderate 04/28/25 0810   Drainage Description Bloody 04/28/25 0810   Liliana-wound Assessment Edema;Erythema;Fragile;Purple;Pale;Swelling 04/28/25 0810   Treatments Site care;Irrigation with NSS 04/28/25 0810   Wound Site Closure None 04/28/25 0810   Dressing Xeroform;ABD;Dry dressing 04/28/25 0810   Wound packed? No 04/28/25 0810   Dressing Changed New 04/28/25 0810   Patient Tolerance Tolerated well 04/28/25 0810   Dressing Status Clean;Dry;Intact 04/28/25 0810       Wound 04/09/25 Coccyx (Active)   Wound Image   04/28/25 0818   Wound Description Beefy red;Pink;Drainage 04/28/25 0818   Non-staged Wound Description Full thickness 04/28/25 0818   Wound Length (cm) 0.5 cm 04/28/25 0818   Wound Width (cm) 0.5 cm 04/28/25 0818   Wound Depth (cm) 0.3 cm 04/28/25 0818   Wound Surface Area (cm^2) 0.25 cm^2 04/28/25 0818   Wound Volume (cm^3) 0.075 cm^3 04/28/25 0818   Calculated Wound Volume (cm^3) 0.08 cm^3 04/28/25 0818   Change in Wound Size % -14.29 04/28/25 0818   Drainage Amount Scant 04/28/25 0818   Drainage Description Serosanguineous 04/28/25 0818   Liliana-wound Assessment Maceration;Pink;Pale;White 04/28/25 0818   Treatments Site care 04/28/25 0818   Wound Site Closure None 04/28/25 0818   Dressing Calcium Alginate with Silver;Foam, Silicon (eg. Allevyn, etc) 04/28/25 0818   Wound packed? No 04/28/25 0818   Dressing Changed New 04/28/25 0818   Patient Tolerance Tolerated well 04/28/25 0818   Dressing Status Clean;Dry;Intact 04/28/25 0818     Right elbow, closed, no drainage appreciated.     Aster Coughlin BSN, RN, CWCN

## 2025-04-28 NOTE — PROGRESS NOTES
04/28/25 0830   Pain Assessment   Pain Assessment Tool 0-10   Pain Score 8   Pain Location/Orientation Location: Back   Pain Onset/Description Onset: Ongoing   Patient's Stated Pain Goal No pain   Hospital Pain Intervention(s) Rest   Restrictions/Precautions   Precautions Fall Risk;Fluid restriction;Pain;Spinal precautions;Visual deficit   ROM Restrictions   (lumbar spinal prec)   Braces or Orthoses LSO   General   Change In Medical/Functional Status (S)  IRP's with RW   Cognition   Overall Cognitive Status Impaired   Arousal/Participation Cooperative   Attention Attends with cues to redirect   Orientation Level Oriented to person;Oriented to place   Memory Decreased recall of precautions   Following Commands Follows one step commands without difficulty   Sit to Lying   Type of Assistance Needed Independent   Comment recliner   Sit to Lying CARE Score 6   Lying to Sitting on Side of Bed   Type of Assistance Needed Independent   Comment recliner   Lying to Sitting on Side of Bed CARE Score 6   Sit to Stand   Type of Assistance Needed Independent;Adaptive equipment   Comment RW   Sit to Stand CARE Score 6   Bed-Chair Transfer   Type of Assistance Needed Independent;Adaptive equipment   Comment RW   Chair/Bed-to-Chair Transfer CARE Score 6   Transfer Bed/Chair/Wheelchair   Adaptive Equipment Roller Walker   Walk 10 Feet   Type of Assistance Needed Adaptive equipment;Independent   Comment RW   Walk 10 Feet CARE Score 6   Walk 50 Feet with Two Turns   Type of Assistance Needed Set-up / clean-up;Adaptive equipment   Comment DS with RW   Walk 50 Feet with Two Turns CARE Score 5   Walk 150 Feet   Type of Assistance Needed Adaptive equipment;Set-up / clean-up   Comment DS with RW   Walk 150 Feet CARE Score 5   Ambulation   Primary Mode of Locomotion Prior to Admission Walk   Distance Walked (feet) 150 ft  (x3)   Assist Device Roller Walker   Gait Pattern Inconsistant Lisa;Decreased foot clearance;Forward Flexion;Wide  "GARO;Shuffle;Improper weight shift   Limitations Noted In Heel Strike;Posture;Speed;Swing   Provided Assistance with: Direction   Walk Assist Level Modified Independent;Distant Supervision   Does the patient walk? 2. Yes   Wheelchair mobility   Does the patient use a wheelchair? 0. No   Curb or Single Stair   Style negotiated Single stair   Type of Assistance Needed Supervision;Adaptive equipment   Comment CS with RW on 6\"  steps   1 Step (Curb) CARE Score 4   Stairs   Type Stairs   # of Steps 2   Weight Bearing Precautions Fall Risk   Assist Devices Single Rail;Roller Walker   Findings Pt went over visual of how he uses his RW on steps. Pt places RW folded on step above when ascending and step below when descending.   Picking Up Object   Comment (S)  Please perform next session   Toilet Transfer   Type of Assistance Needed Independent;Adaptive equipment   Comment RW   Toilet Transfer CARE Score 6   Therapeutic Interventions   Strengthening Seated LAQ, hip flex and ankle DF/PF 3 x10 reps BLE with 2#   Other B surgical shoes donned as pt c/o increased bleeding on R foot overnight and this morning. Pt looking for something for added support.   Assessment   Treatment Assessment Pt participated in skilled PT session with increased focus on gait, LE strengthening and endurance. Pt c/o increased bleeding to R foot, per nursing there was no active bleeding since 12 AM. He asked for something for added protection or under foot. The nurse agreed surgical shoes will be ok. Pt will cont POC as tolerated with increased focus on increased I with all gait, stair management and functional transfers.   Problem List Decreased strength;Decreased endurance;Impaired balance;Decreased mobility;Orthopedic restrictions;Pain;Impaired vision   Barriers to Discharge Inaccessible home environment;Decreased caregiver support   PT Barriers   Functional Limitation Car transfers;Stair negotiation;Standing;Transfers;Walking   Plan "   Treatment/Interventions Functional transfer training;LE strengthening/ROM;Therapeutic exercise;Endurance training;Cognitive reorientation;Patient/family training;Bed mobility;Gait training   Progress Progressing toward goals   Discharge Recommendation   Equipment Recommended Walker   PT Therapy Minutes   PT Time In 0830   PT Time Out 0930   PT Total Time (minutes) 60   PT Mode of treatment - Individual (minutes) 60   PT Mode of treatment - Concurrent (minutes) 0   PT Mode of treatment - Group (minutes) 0   PT Mode of treatment - Co-treat (minutes) 0   PT Mode of Treatment - Total time(minutes) 60 minutes   PT Cumulative Minutes 1080   Therapy Time missed   Time missed? No

## 2025-04-28 NOTE — ASSESSMENT & PLAN NOTE
Sevelamer with meals 3 times daily   Continue renal diet     The patient has been examined and the H&P has been reviewed:    I concur with the findings and no changes have occurred since H&P was written.    Procedure risks, benefits and alternative options discussed and understood by patient/family.    There are no hospital problems to display for this patient.

## 2025-04-29 ENCOUNTER — APPOINTMENT (INPATIENT)
Dept: DIALYSIS | Facility: HOSPITAL | Age: 65
DRG: 939 | End: 2025-04-29
Attending: STUDENT IN AN ORGANIZED HEALTH CARE EDUCATION/TRAINING PROGRAM
Payer: MEDICARE

## 2025-04-29 LAB
ANION GAP SERPL CALCULATED.3IONS-SCNC: 13 MMOL/L (ref 4–13)
BUN SERPL-MCNC: 60 MG/DL (ref 5–25)
CALCIUM SERPL-MCNC: 9.2 MG/DL (ref 8.4–10.2)
CHLORIDE SERPL-SCNC: 96 MMOL/L (ref 96–108)
CO2 SERPL-SCNC: 23 MMOL/L (ref 21–32)
CREAT SERPL-MCNC: 8.38 MG/DL (ref 0.6–1.3)
ERYTHROCYTE [DISTWIDTH] IN BLOOD BY AUTOMATED COUNT: 18.6 % (ref 11.6–15.1)
GFR SERPL CREATININE-BSD FRML MDRD: 6 ML/MIN/1.73SQ M
GLUCOSE SERPL-MCNC: 96 MG/DL (ref 65–140)
HCT VFR BLD AUTO: 26.3 % (ref 36.5–49.3)
HGB BLD-MCNC: 8 G/DL (ref 12–17)
MCH RBC QN AUTO: 29.3 PG (ref 26.8–34.3)
MCHC RBC AUTO-ENTMCNC: 30.4 G/DL (ref 31.4–37.4)
MCV RBC AUTO: 96 FL (ref 82–98)
PLATELET # BLD AUTO: 224 THOUSANDS/UL (ref 149–390)
PMV BLD AUTO: 8.9 FL (ref 8.9–12.7)
POTASSIUM SERPL-SCNC: 5.5 MMOL/L (ref 3.5–5.3)
RBC # BLD AUTO: 2.73 MILLION/UL (ref 3.88–5.62)
SODIUM SERPL-SCNC: 132 MMOL/L (ref 135–147)
TRANSFUSION STATUS PATIENT QL: NORMAL
WBC # BLD AUTO: 7.55 THOUSAND/UL (ref 4.31–10.16)

## 2025-04-29 PROCEDURE — 80048 BASIC METABOLIC PNL TOTAL CA: CPT | Performed by: INTERNAL MEDICINE

## 2025-04-29 PROCEDURE — 86880 COOMBS TEST DIRECT: CPT | Performed by: NURSE PRACTITIONER

## 2025-04-29 PROCEDURE — 99232 SBSQ HOSP IP/OBS MODERATE 35: CPT | Performed by: STUDENT IN AN ORGANIZED HEALTH CARE EDUCATION/TRAINING PROGRAM

## 2025-04-29 PROCEDURE — 97535 SELF CARE MNGMENT TRAINING: CPT

## 2025-04-29 PROCEDURE — 97530 THERAPEUTIC ACTIVITIES: CPT

## 2025-04-29 PROCEDURE — 85027 COMPLETE CBC AUTOMATED: CPT | Performed by: INTERNAL MEDICINE

## 2025-04-29 PROCEDURE — 99232 SBSQ HOSP IP/OBS MODERATE 35: CPT | Performed by: NURSE PRACTITIONER

## 2025-04-29 PROCEDURE — 99232 SBSQ HOSP IP/OBS MODERATE 35: CPT | Performed by: INTERNAL MEDICINE

## 2025-04-29 PROCEDURE — 86901 BLOOD TYPING SEROLOGIC RH(D): CPT | Performed by: NURSE PRACTITIONER

## 2025-04-29 PROCEDURE — 97110 THERAPEUTIC EXERCISES: CPT

## 2025-04-29 PROCEDURE — 86900 BLOOD TYPING SEROLOGIC ABO: CPT | Performed by: NURSE PRACTITIONER

## 2025-04-29 PROCEDURE — 97116 GAIT TRAINING THERAPY: CPT

## 2025-04-29 RX ADMIN — ALLOPURINOL 100 MG: 100 TABLET ORAL at 07:50

## 2025-04-29 RX ADMIN — OXYCODONE HYDROCHLORIDE 5 MG: 5 TABLET ORAL at 05:58

## 2025-04-29 RX ADMIN — EPOETIN ALFA 8000 UNITS: 4000 SOLUTION INTRAVENOUS; SUBCUTANEOUS at 16:12

## 2025-04-29 RX ADMIN — SENNOSIDES 17.2 MG: 8.6 TABLET, FILM COATED ORAL at 12:01

## 2025-04-29 RX ADMIN — SEVELAMER HYDROCHLORIDE 1600 MG: 800 TABLET ORAL at 18:06

## 2025-04-29 RX ADMIN — NIFEDIPINE 30 MG: 30 TABLET, FILM COATED, EXTENDED RELEASE ORAL at 18:07

## 2025-04-29 RX ADMIN — ATORVASTATIN CALCIUM 80 MG: 80 TABLET, FILM COATED ORAL at 18:07

## 2025-04-29 RX ADMIN — SEVELAMER HYDROCHLORIDE 1600 MG: 800 TABLET ORAL at 11:59

## 2025-04-29 RX ADMIN — SEVELAMER HYDROCHLORIDE 1600 MG: 800 TABLET ORAL at 08:53

## 2025-04-29 RX ADMIN — METHOCARBAMOL 1000 MG: 500 TABLET ORAL at 13:10

## 2025-04-29 RX ADMIN — ACETAMINOPHEN 975 MG: 325 TABLET, FILM COATED ORAL at 13:10

## 2025-04-29 RX ADMIN — Medication 2.5 MG: at 07:49

## 2025-04-29 RX ADMIN — METHOCARBAMOL 1000 MG: 500 TABLET ORAL at 23:00

## 2025-04-29 RX ADMIN — ACETAMINOPHEN 975 MG: 325 TABLET, FILM COATED ORAL at 05:42

## 2025-04-29 RX ADMIN — DOCUSATE SODIUM 100 MG: 100 CAPSULE, LIQUID FILLED ORAL at 07:49

## 2025-04-29 RX ADMIN — METHOCARBAMOL 1000 MG: 500 TABLET ORAL at 05:41

## 2025-04-29 RX ADMIN — OXYCODONE HYDROCHLORIDE 10 MG: 10 TABLET ORAL at 21:21

## 2025-04-29 RX ADMIN — OXYCODONE HYDROCHLORIDE 10 MG: 10 TABLET ORAL at 12:17

## 2025-04-29 RX ADMIN — OXYCODONE HYDROCHLORIDE 10 MG: 10 TABLET ORAL at 01:06

## 2025-04-29 RX ADMIN — Medication 6 MG: at 23:00

## 2025-04-29 RX ADMIN — DOCUSATE SODIUM 100 MG: 100 CAPSULE, LIQUID FILLED ORAL at 18:07

## 2025-04-29 RX ADMIN — OXYCODONE HYDROCHLORIDE 10 MG: 10 TABLET ORAL at 16:28

## 2025-04-29 RX ADMIN — ACETAMINOPHEN 975 MG: 325 TABLET, FILM COATED ORAL at 21:21

## 2025-04-29 NOTE — PROGRESS NOTES
"Progress Note - Hospitalist   Name: Naresh Carpio 64 y.o. male I MRN: [id number]  Unit/Bed#: -01 I Date of Admission: 4/11/2025   Date of Service: 4/29/2025 I Hospital Day: 18    Assessment & Plan  Traumatic retroperitoneal hematoma  Admitted to the trauma service after a fall with L2 vertebral body and retroperitoneal hematoma.  S/p IR embolization of distal right deep circumflex iliac artery/distal right superior gluteal artery both of which supplied an area of active extravasation and then pseudoaneurysms present at distal branches of the left inferior gluteal artery. The artery was embolized using coils and gelfoam.   He was type and screened 4/17/25 in case needed blood during HD on that day.  Hgb 4/22 improved to 8.4  Anemia due to chronic kidney disease, on chronic dialysis ([facility])  Had 5 U PRBCs during hospital stay  Hemoglobin has been running mostly 7.3-7.4.    On 4/14/25 was 7.6; on 4/15/24 was 7.2 ---> Renal increased his Epogen in light of his hemoglobin  On 4/17, hemoglobin had increased to 7.7  4/19/25 Hgb up to 8.1.   CBC Tuesday in HD 4/22/25 = 8.4 and stable  ESRD (end stage renal disease) ([facility])  Continue Sevelamer TID with meals  Renal following  HD T-TH-Sat while here in rehab  Paroxysmal atrial fibrillation ([facility])  On no rate control medications  Examines RRR  Anticoagulation with Eliquis as at home but he doesn't want to take it because he is worried it will make his right eye vision worse.  I d/w on call Optho Dr Diaz = \"The risk is not quantified in the literature. Dr edmond's note suggests a diagnosis of proliferative diabetic retinopathy and bilateral and recurrent vitreous hemorrhage. So he is at risk for - and has had hemorrhage - whether or not he is on eliquis (assuming dr edmond is correct).     I would always recommend life and death are more important so to do what you recommend re his systemic condition, and then to follow up with dr edmond as he recommended so a real " "exam can be done in the clinic\".   Explained all this to him and he understands  He had been taking the Eliquis again and now refusing it because he is afraid of his foot bleeding.  He says \"no point in discussing it\"; I d/w him but again refuses to take it  Chronic diastolic (congestive) heart failure (HCC)  Some LE edema = chronic/stable  Renal diet  stable  Volume management via HD  Primary hypertension  Continue Procardia XL 30 mg qd  Tx per renal  Stable; no changes today 4/29/25  Visual disturbance  Reported a right eye \"floater\" without associated pain earlier in his hospitalization which has resolved but continued to have blurry central vision.  Ophthalmology saw here in [facility] = visual acuity without correction is 20/400 OU. Dx: Proliferative diabetic retinopathy with vitreous hemorrhage OU as well as cataracts.   Will need outpatient follow up immediately after discharge from rehab  Diabetic ulcer of left midfoot associated with type 2 diabetes mellitus, limited to breakdown of skin (HCC)  Continue local care  Being followed by WC  Last LEADS was 3/28/25 = 50-75% right prox polital and prox tibioperoneal trunk; >75% stenosis in prox popliteal artery on left  Was seen by VS in 12/2024 hospital stay and he declined any intervention at the time  Podiatry saw here on 4/22/25, s/p debridement = no surgical intervention needed at this time  Had new bleeding from foot wound 4/27/25. The bleeding then resolved. Podiatry was made aware.  Hyponatremia  Renal following  Continue fluid restriction 1200 cc per 24 hours  Closed fracture of proximal end of left humerus with routine healing  Occurred from a fall in February and eval'd at the time of the injury  Was to continue NWB/sling and followup with Ortho but there was no followup done  Xray 4/13/25 = \"Unchanged alignment of the chronic greater tuberosity avulsion fracture\".   Ortho saw here on 4/14 = WBAT; see Dr Cisneros 4 weeks @ 5/14/25  Gout  Continue " Allopurinol  L2 vertebral fracture (HCC)  CT CAP 4/2: Fracture of L2 vertebral body extending to the superior endplate  Continue LSO brace  Insomnia  Patient reports not sleeping well at night because he has trouble getting comfortable. He has been trying to sleep in the recliner   PAD (peripheral artery disease) (HCC)  Vascular surgery saw per Podiatry's recommendation = they will see in the office.  They advised start ASA but after the d/w the PA in relation to his vitreous hemorrhages, can hold off on that and d/w him in the office.  This will give him time to see the Retina specialist as OP as about getting tx    The above assessment and plan was reviewed and updated as determined by my evaluation of the patient on 4/29/2025.    History of Present Illness   Patient seen and examined. Patients overnight issues or events were reviewed with nursing staff. New or overnight issues include the following:   No new or overnight issues.  Offers no complaints    Review of Systems   All other systems reviewed and are negative.      Objective :  Temp:  [97.7 °F (36.5 °C)-97.8 °F (36.6 °C)] 97.8 °F (36.6 °C)  HR:  [60-67] 67  BP: (151-183)/(61-68) 183/61  Resp:  [16-17] 16  SpO2:  [98 %-99 %] 98 %  O2 Device: None (Room air)    Invasive Devices       Hemodialysis Catheter  Duration             HD Permanent Double Catheter 608 days                    Physical Exam  General Appearance: no distress, nontoxic appearing  HEENT:  External ear normal.  Nose normal w/o drainage. Mucous membranes are moist. Oropharynx is clear. Conjunctiva clear w/o icterus or redness.  Neck:  Supple, normal ROM  Lungs: BBS without crackles/wheeze/rhonchi; respirations unlabored with normal inspiratory/expiratory effort.  No retractions noted.  On RA  CV: regular rate and rhythm; no rubs/murmurs/gallops, PMI normal   ABD: Abdomen is soft.  Bowel sounds all quadrants.  Nontender with no distention.    EXT: + tight lower leg edema  Skin: normal turgor,  normal texture  Psych: affect normal, mood normal  Neuro: AAO       The above physical exam was reviewed and updated as determined by my evaluation of the patient on 4/29/2025.      Lab Results: I have reviewed the following results:  Results from last 7 days   Lab Units 04/22/25  1237   WBC Thousand/uL 7.27   HEMOGLOBIN g/dL 8.4*   HEMATOCRIT % 26.4*   PLATELETS Thousands/uL 327     Results from last 7 days   Lab Units 04/22/25  1237   SODIUM mmol/L 129*   POTASSIUM mmol/L 4.3   CHLORIDE mmol/L 89*   CO2 mmol/L 29   BUN mg/dL 48*   CREATININE mg/dL 7.18*   CALCIUM mg/dL 8.9                   Imaging Results Review: No pertinent imaging studies reviewed.  Other Study Results Review: No additional pertinent studies reviewed.    Review of Scheduled Meds: Medications  reviewed and reconciled as needed  Current Facility-Administered Medications   Medication Dose Route Frequency Provider Last Rate    acetaminophen  975 mg Oral Q8H [facility] Damian Cerda MD      allopurinol  100 mg Oral Daily Damian Cerda MD      aluminum-magnesium hydroxide-simethicone  30 mL Oral Q4H PRN GRANT Church      apixaban  5 mg Oral BID Damian Cerda MD      atorvastatin  80 mg Oral Daily With Dinner Damian Cerda MD      bisacodyl  10 mg Rectal Daily PRN Michelle Landa,       calcium carbonate  500 mg Oral Daily PRN GRANT Church      docusate sodium  100 mg Oral BID Michelle Landa, DO      epoetin jessica  8,000 Units Intravenous After Dialysis German Linton MD      gabapentin  100 mg Oral Once per day on Monday Wednesday Friday Damian Cerda MD      lidocaine  1 patch Topical Daily PRN Damian Cerda MD      melatonin  6 mg Oral HS Jayden Rowland MD      methocarbamol  1,000 mg Oral Q8H [facility] Jayden Rowland MD      naloxone  0.04 mg Intravenous Q1MIN PRN Damian Cerda MD      NIFEdipine  30 mg Oral After Dinner Damian Cerda MD      ondansetron  4 mg Oral Q6H PRN Gino Austin PA-C      oxyCODONE  5 mg Oral Q4H PRN  Damian Cerda MD      Or    oxyCODONE  10 mg Oral Q4H PRN Damian Cerda MD      oxyCODONE  2.5 mg Oral Q8H PRN Jayden Rowland MD      polyethylene glycol  17 g Oral Daily PRN Michelle Landa, DO      senna  2 tablet Oral Daily With Lunch Michelle Landa, DO      sevelamer  1,600 mg Oral TID With Meals Damian Cerda MD      white petrolatum-mineral oil   Topical TID PRN Michelle Landa, DO         VTE Pharmacologic Prophylaxis: Eliquis  Code Status: Level 1 - Full Code  Current Length of Stay: 18 day(s)    Administrative Statements     ** Please Note:  voice to text software may have been used in the creation of this document. Although proof errors in transcription or interpretation are a potential of such software**

## 2025-04-29 NOTE — PLAN OF CARE
Problem: PAIN - ADULT  Goal: Verbalizes/displays adequate comfort level or baseline comfort level  Description: Interventions:- Encourage patient to monitor pain and request assistance- Assess pain using appropriate pain scale- Administer analgesics based on type and severity of pain and evaluate response- Implement non-pharmacological measures as appropriate and evaluate response- Consider cultural and social influences on pain and pain management- Notify physician/advanced practitioner if interventions unsuccessful or patient reports new pain  Outcome: Progressing     Problem: INFECTION - ADULT  Goal: Absence or prevention of progression during hospitalization  Description: INTERVENTIONS:- Assess and monitor for signs and symptoms of infection- Monitor lab/diagnostic results- Monitor all insertion sites, i.e. indwelling lines, tubes, and drains- Monitor endotracheal if appropriate and nasal secretions for changes in amount and color- Davis appropriate cooling/warming therapies per order- Administer medications as ordered- Instruct and encourage patient and family to use good hand hygiene technique- Identify and instruct in appropriate isolation precautions for identified infection/condition  Outcome: Progressing  Goal: Absence of fever/infection during neutropenic period  Description: INTERVENTIONS:- Monitor WBC  Outcome: Progressing     Problem: SAFETY ADULT  Goal: Patient will remain free of falls  Description: INTERVENTIONS:- Educate patient/family on patient safety including physical limitations- Instruct patient to call for assistance with activity - Consult OT/PT to assist with strengthening/mobility - Keep Call bell within reach- Keep bed low and locked with side rails adjusted as appropriate- Keep care items and personal belongings within reach- Initiate and maintain comfort rounds- Make Fall Risk Sign visible to staff- Offer Toileting every 2 Hours, in advance of need- Initiate/Maintain alarm-  Obtain necessary fall risk management equipment: - Apply yellow socks and bracelet for high fall risk patients- Consider moving patient to room near nurses station  Outcome: Progressing  Goal: Maintain or return to baseline ADL function  Description: INTERVENTIONS:-  Assess patient's ability to carry out ADLs; assess patient's baseline for ADL function and identify physical deficits which impact ability to perform ADLs (bathing, care of mouth/teeth, toileting, grooming, dressing, etc.)- Assess/evaluate cause of self-care deficits - Assess range of motion- Assess patient's mobility; develop plan if impaired- Assess patient's need for assistive devices and provide as appropriate- Encourage maximum independence but intervene and supervise when necessary- Involve family in performance of ADLs- Assess for home care needs following discharge - Consider OT consult to assist with ADL evaluation and planning for discharge- Provide patient education as appropriate  Outcome: Progressing  Goal: Maintains/Returns to pre admission functional level  Description: INTERVENTIONS:- Perform AM-PAC 6 Click Basic Mobility/ Daily Activity assessment daily.- Set and communicate daily mobility goal to care team and patient/family/caregiver. - Collaborate with rehabilitation services on mobility goals if consulted- Perform Range of Motion 3 times a day.- Reposition patient every 2 hours.- Dangle patient 3 times a day- Stand patient 3 times a day- Ambulate patient 3 times a day- Out of bed to chair 3 times a day - Out of bed for meals 3 times a day- Out of bed for toileting- Record patient progress and toleration of activity level   Outcome: Progressing     Problem: DISCHARGE PLANNING  Goal: Discharge to home or other facility with appropriate resources  Description: INTERVENTIONS:- Identify barriers to discharge w/patient and caregiver- Arrange for needed discharge resources and transportation as appropriate- Identify discharge learning  needs (meds, wound care, etc.)- Arrange for interpretive services to assist at discharge as needed- Refer to Case Management Department for coordinating discharge planning if the patient needs post-hospital services based on physician/advanced practitioner order or complex needs related to functional status, cognitive ability, or social support system  Outcome: Progressing     Problem: Prexisting or High Potential for Compromised Skin Integrity  Goal: Skin integrity is maintained or improved  Description: INTERVENTIONS:- Identify patients at risk for skin breakdown- Assess and monitor skin integrity- Assess and monitor nutrition and hydration status- Monitor labs - Assess for incontinence - Turn and reposition patient- Assist with mobility/ambulation- Relieve pressure over bony prominences- Avoid friction and shearing- Provide appropriate hygiene as needed including keeping skin clean and dry- Evaluate need for skin moisturizer/barrier cream- Collaborate with interdisciplinary team - Patient/family teaching- Consider wound care consult   Outcome: Progressing     Problem: METABOLIC, FLUID AND ELECTROLYTES - ADULT  Goal: Electrolytes maintained within normal limits  Description: INTERVENTIONS:- Monitor labs and assess patient for signs and symptoms of electrolyte imbalances- Administer electrolyte replacement as ordered- Monitor response to electrolyte replacements, including repeat lab results as appropriate- Instruct patient on fluid and nutrition as appropriate  Outcome: Progressing  Goal: Fluid balance maintained  Description: INTERVENTIONS:- Monitor labs - Monitor I/O and WT- Instruct patient on fluid and nutrition as appropriate- Assess for signs & symptoms of volume excess or deficit  Outcome: Progressing     Problem: Nutrition/Hydration-ADULT  Goal: Nutrient/Hydration intake appropriate for improving, restoring or maintaining nutritional needs  Description: Monitor and assess patient's nutrition/hydration  status for malnutrition. Collaborate with interdisciplinary team and initiate plan and interventions as ordered.  Monitor patient's weight and dietary intake as ordered or per policy. Utilize nutrition screening tool and intervene as necessary. Determine patient's food preferences and provide high-protein, high-caloric foods as appropriate. INTERVENTIONS:- Monitor oral intake, urinary output, labs, and treatment plans- Assess nutrition and hydration status and recommend course of action- Evaluate amount of meals eaten- Assist patient with eating if necessary - Allow adequate time for meals- Recommend/ encourage appropriate diets, oral nutritional supplements, and vitamin/mineral supplements- Order, calculate, and assess calorie counts as needed- Recommend, monitor, and adjust tube feedings and TPN/PPN based on assessed needs- Assess need for intravenous fluids- Provide specific nutrition/hydration education as appropriate- Include patient/family/caregiver in decisions related to nutrition  Outcome: Progressing     Problem: Nutrition/Hydration-ADULT  Goal: Nutrient/Hydration intake appropriate for improving, restoring or maintaining nutritional needs  Description: Monitor and assess patient's nutrition/hydration status for malnutrition. Collaborate with interdisciplinary team and initiate plan and interventions as ordered.  Monitor patient's weight and dietary intake as ordered or per policy. Utilize nutrition screening tool and intervene as necessary. Determine patient's food preferences and provide high-protein, high-caloric foods as appropriate. INTERVENTIONS:- Monitor oral intake, urinary output, labs, and treatment plans- Assess nutrition and hydration status and recommend course of action- Evaluate amount of meals eaten- Assist patient with eating if necessary - Allow adequate time for meals- Recommend/ encourage appropriate diets, oral nutritional supplements, and vitamin/mineral supplements- Order, calculate,  and assess calorie counts as needed- Recommend, monitor, and adjust tube feedings and TPN/PPN based on assessed needs- Assess need for intravenous fluids- Provide specific nutrition/hydration education as appropriate- Include patient/family/caregiver in decisions related to nutrition  Outcome: Progressing

## 2025-04-29 NOTE — ASSESSMENT & PLAN NOTE
Patient reports he has an overall a poor sleep hygiene: he sleeps at 1-2am nightly and watches TV up until bed.  Advised patient to attempt to regulate sleep/wake cycle while here to better participate in therapy. Attempt to try to sleep earlier around midnight  >4/15: reviewed sleep log: Pt went to sleep around midnight and slept for 6 hours.  > 4/17- Poor sleep last night: was awake every other hour. Requiring nap during the day. Can increase melatonin tomorrow if this persists  Pt endorses moving from bed to chair multiple times throughout the night which is his baseline at home too    - Continue sleep log  - Continue melatonin 6mg nightly

## 2025-04-29 NOTE — ASSESSMENT & PLAN NOTE
#ESRD on HD TTS:  Dialysis unit/days: [facility]  Access: PermCath  Last hemodialysis was on April 26th, which patient cut treatment short  Educated on completing full treatment to help facilitate better volume removal.  Plan for dialysis this afternoon.  Sodium down to 129, volume mediated monitor with ultrafiltration on dialysis.

## 2025-04-29 NOTE — PROGRESS NOTES
Progress Note - PMR   Name: Naresh Carpio 64 y.o. male I MRN: [id number]  Unit/Bed#: -01 I Date of Admission: 4/11/2025   Date of Service: 4/29/2025 I Hospital Day: 18     Assessment & Plan  Traumatic retroperitoneal hematoma  - Sustained from mechanical fall several days prior to initial presentation on 4/2/25, presenting with progressively worsening low back pain. Trauma imaging revealed a large retroperitoneal hematoma with active extravasation and hemoglobin drop. Now s/p transfusion, Kcentra and ultimately IR embolization of the right deep circumflex iliac artery and superior gluteal artery with Dr. Garcia on 4/3/25. Unfortunately, on 4/6/25, his hemoglobin decreased and repeat CT showed a 5 mm inferior gluteal artery branch pseudoaneurysm and he is s/p IR pelvic angiogram on 4/7/25 with Dr. Hernandez, which demonstrated pseudoaneurysms at the distal branches of the left inferior gluteal artery which was embolized.   > 4/15: Hgb 7.2 today (from 7.6 yesterday). Back pain seems controlled. Will be receiving increased Epogen today per IM/Nephro  > Hgb 8.4 on 4/22 labs    - Continue to trend Hgb on CBC closely  - Consider repeat imaging if Hgb continues to downtrend or pain significantly worsens  - Contact IR if there are additional bleeding concerns given multiple prior interventions described above  - Local right hip/groin access site incision care  - Analgesia, see below  - PT and OT  Acute pain due to trauma  - Evaluated by APS on acute care  - Tylenol 975 mg q8h scheduled  - Gabapentin 100 mg three times weekly  - Methocarbamol 1000 mg q8h scheduled  - Lidocaine patches daily PRN  - Oxycodone 5 or 10 mg q4h PRN, wean as possible  Impaired mobility and activities of daily living  - Rehabilitation medicine physician for daily monitoring of care, 24 hour availability for acute medical issues, medication management, and therapeutic and diagnostic assessments.  - 24 hour rehabilitation nursing 7 days per week  for: management/teaching of medications, bowel/bladder routine, skin care.  - PT, OT for 2-3 hours per day, 5 to 6 days per week; 15 hours per week  - Rehabilitation Psychology as needed for adjustment and coping  - CM for barriers to discharge, community resources, and family support  - Discharge planning following to help ensure a safe and efficient discharge  - 900/7 program due to HD status, pain, expected therapy tolerance  4/18- Ongoing dispo planning with patient/sister and CM. Staff expressed that patient is progressing well from a functional standpoint but continues to have difficulties with ADLs/iADLs due to impaired vision. Will continue to work towards goals for safe dischare  4/22- DC date now pending outpatient HD setup    L2 vertebral fracture (HCC)  > Sustained from a mechanical fall on 3/24/25, found to have an acute, obliquely oriented fracture of the L2 vertebral body with distraction of the dominant fracture fragment and involvement of the anterior and posterior cortices and superior endplate  > At that time, managed non-operatively with LSO bracing, though it does not appear orthopedic spine surgery or neurosurgery evaluated at the time of injury    - Maintain LSO brace with lumbar spinal precautions   - Should follow-up with spine surgery outpatient  Closed fracture of proximal end of left humerus with routine healing  - Sustained from fall in early February, per CT of the LUE on 2/9/25, there was a minimally displaced fracture of the anterior facet of the greater tuberosity of the left humerus, minimally apparent on repeat x-ray of the shoulder on 3/24/25  - He was evaluated by orthopedic surgery at the time of the initial injury and was recommended to maintain NWB through the LUE with a sling  - There is no further orthopedic evaluation documentation and it does not appear that further follow-up occurred.  - Clinically, he is not describing ongoing significant left arm or shoulder pain and  "has been weight-bearing through the LUE  > 4/14: Repeat humerus X-ray done yesterday shows no change in fracture alignment. Ortho consulted.  >4/15: Ortho c/s: May WBAT to LUE. PT/OT to increase strength and ROM. F/u with Dr. Cisneros in 4 weeks    - Monitor  - PT/OT  Insomnia  Patient reports he has an overall a poor sleep hygiene: he sleeps at 1-2am nightly and watches TV up until bed.  Advised patient to attempt to regulate sleep/wake cycle while here to better participate in therapy. Attempt to try to sleep earlier around midnight  >4/15: reviewed sleep log: Pt went to sleep around midnight and slept for 6 hours.  > 4/17- Poor sleep last night: was awake every other hour. Requiring nap during the day. Can increase melatonin tomorrow if this persists  Pt endorses moving from bed to chair multiple times throughout the night which is his baseline at home too    - Continue sleep log  - Continue melatonin 6mg nightly  At risk for constipation   4/15- Adjusted bowel medications: Colace 100mg BID, Senna 17.2mg with lunch and PRN Miralax/Suppos  4/17- No BM since 4/13. Lactulose this evening if no BM after lunch. Can consider Relistor if no success for opioid-inducted constipation  4/18- small hard BMs x2 yesterday after lactulose and suppository. Continue bowel regimen  >BM 4/22 but required Relistor and suppository/lactulose  > Last BM 4/29    - Will follow BMs and adjust bowel regimen to target soft, formed stools q1-2 days, per pt's regular schedule.  Anemia due to chronic kidney disease, on chronic dialysis (HCC)  > 4/14: Hgb 7.6  > 4/15: Hgb 7.2 - Discussed with IM who is coordinating with Nephrology; they will increase his Epoetin jessica  > 4/17: Hgb 7.7  > 4/29: Hgb 8.0    - Trend Hgb on CBC  - Epoetin jessica with dialysis  - Transfusions per IM, appreciate their recommendations  Visual disturbance  - Reporting \"floater\" within the right eye with acute onset on 4/11/25 at mid-day while watching TV; describes a " "dark, \"spider\"-shaped floater that occupies a significant portion of the visual field, painless; has similar floaters in the left eye but smaller  - Ophthalmology consulted on [facility] admission by IM, per prior trauma surgery documentation, Dr. Chaudhry of ophthalmology was made personally aware of clinical situation on 4/11/25 prior to [facility] admission  > Ophthalmology consult 4/12: Exam c/w proliferative diabetic retinopathy with vitreous hemorrhage. Pt will follow up with ophtho immediately after discharge and for referral to a retina specialist  > 4/15- Pt thinks his R eye floater may be larger today, unable to clearly state or quantify symptoms. No other visual complaints of increased number of floaters, double/blurry vision, or bright flashes. Advised patient to inform us if symptoms change - will reach out to Ophthalmology accordingly  > 4/18- No changes noted in vision/floaters. His overall impaired vision continues to be a barrier for his ADLs/iADLs  > 4/20-24- Stable R eye floater    - Monitor for changes in vision  Diabetic ulcer of left midfoot associated with type 2 diabetes mellitus, limited to breakdown of skin (HCC)  - Has chronic wounds affecting the left foot plantar surface, left foot 1st and 2nd digits  - Left Plantar Foot Wounds and Left toes:  Cleanse with NSS and pat dry. Paint all wounds with Betadine daily and cover plantar open wound with silicone bordered foam dressing. Naren with T for Treatment and change every other day or PRN   > 4/22 : Wound care recs: Paint left foot 1st and 2nd digits with betadine dailyand covering plantar open wound with foam. Podiatry consulted   > 4/22: Podiatry did bedside debridement of left foot plantar ulcer. Continue with local wound care, no surgical intervention needed. SALVATORE ordered and vascular surgery consulted. Pt to maintain frequent LLE elevation, strict pressure and fracture reduction. WBAT to LLE  > 4/24: Pictures of LLE reviewed on chart. Wounds are stable " with no signs of infection. XR L foot 4/22 with no evidence of OM. ABIs and toe pressures are within healing range. Pt to continue to follow up with wound care for further management  PAD (peripheral artery disease) (HCC)  > Non invasive arterial studies from 3/28:  Diffuse disease noted throughout the femoral-popliteal arteries. Evidence of 50-75% stenosis in the proximal popliteal artery. Evidence of 50-75% stenosis at the proximal tibio-peroneal trunk.   LLE: Diffuse disease noted throughout the femoral-popliteal arteries. Evidence of >75% stenosis in the proximal popliteal artery.  Tech Note: Patient refused SALVATORE.  SALVATORE 4/22: Left lower limb SALVATORE noncompressible, metatarsal pressure 155 mmHg, great toe pressure 72 mmHg. Right lower limb noncompressible SALVATORE, metatarsal pressure 158 mmHg, second toe pressure 101 mmHg   Vascular surgery consult pending  Hyponatremia  > 4/14: Na 129 - A&O x3, pt mentating appropriately  > 4/15: Na 125 - Discussed with IM who is coordinating with Nephrology; they will manage Na during HD   > Sodium levels continue to fluctuate from 125-131. Pt remains A&O x3. Nephrology will continue to manage electrolyte deficiencies    - Trend on BMP  - Electrolyte management per nephrology  - Continue HD in setting of ESRD  ESRD (end stage renal disease) (HCC)  - Continue HD  - Nephrology consulted and following, appreciate their recommendations  >4/22: new outpatient HD center needed close to his home after discharge. CM to follow up. DC pushed  Paroxysmal atrial fibrillation (HCC)  - Currently rate controlled  - Embolic stroke risk reduction with apixaban 5 mg BID    >4/22- Refusing it this morning. Extensive conversation had with patient to discuss the importance of compliance given his high stroke risk.   > 4/24- now compliant with Eliquis  Chronic diastolic (congestive) heart failure (HCC)  Wt Readings from Last 3 Encounters:   04/29/25 95.4 kg (210 lb 4.8 oz)   04/11/25 98.5 kg (217 lb 2.5 oz)    03/24/25 107 kg (234 lb 12.6 oz)     - Most recent TTE from 10/9/24 with EF 60-65%, grade 1 diastolic dysfunction  - Volume management with HD per nephrology  - Monitor weights  - Appreciate medicine and nephrology recommendations and management  Primary hypertension  - Nifedipine  - Appreciate IM and nephrology management  Gout  - Allopurinol for flare prophylaxis  Hyperphosphatemia  > 4/15: Phos 4.6  - Management per nephrology  - Continue phosphate binders  Wound of skin  - Has right anterior pre-tibial scab, wash gently daily, leave open to air  - Wound care RN consulted and following  At high risk for skin breakdown  > 4/21- New sacral pressure injury noted by staff. Pictures reviewed on chart. Patient primarily sleeps in recliner (both here and at home). Discussed with therapy and pt will use a cushion that offers sacral relief on recliner. Will continue to monitor  >4/22- Wound care recs:  Cleanse wound with NSS, pat dry. Apply calcium alginate with silver to wound bed and cover with foam. Change every other day and prn. Cushion will not be covered per therapy for home - advised patient to find one off [employer] to continue offloading     - Moisturize skin daily with skin nourishing cream  - Ehob cushion in chair when out of bed.  - Preventative hydraguard to bilateral heels BID and PRN.   - Calazime paste to sacrum/buttocks BID and PRN  - Monitor clinically for breakdown, frequent turns  - Wound care RN consulted and following  At risk for venous thromboembolism (VTE)  - Apixaban, see above  Secondary hyperparathyroidism (HCC)      Subjective   Patient is a 64-year-old male with history of chronic anemia, atrial fibrillation, diabetes, end-stage renal disease on hemodialysis presenting on 4/2 with progressively worsening back pain after a fall found to have left-sided right show peritoneal hematoma with active extravasation status post Kcentra stabilization with transfusions and multiple IR embolizations.  Course complicated by acute on chronic pain     Chief Complaint: f/u ambulatory dysfunction    Interval: Patient sen and examined personally. Continues with back pain and now more on the right side. Asked to be disconnected from dialysis 15 minutes early. Patient denies fever, chills, nausea, emesis, cough, shortness of breath, diarrhea, or constipation. Sleep continues to be poor, mood fluctuates but participates with motivation and can have good conversations when not related to his current situation.    Labs checked today and hemoglobin of 8.0, sodium of 132 and elevated potassium 5.5 checked with dialysis today.    Objective :  Temp:  [97.7 °F (36.5 °C)-98 °F (36.7 °C)] 98 °F (36.7 °C)  HR:  [60-80] 80  BP: (110-183)/(56-71) 110/58  Resp:  [16-18] 18  SpO2:  [98 %] 98 %  O2 Device: None (Room air)    Functional Update:  Physical Therapy Occupational Therapy Speech Therapy   Weight Bearing Status: Full Weight Bearing  Transfers: Supervision  Bed Mobility: Supervision  Amulation Distance (ft): 200 feet (to 350 feet)  Ambulation: Supervision (DS)  Assistive Device for Ambulation: Roller Walker  Number of Stairs: 8  Assistive Device for Stairs: Bilateral Hand Rails  Stair Assistance: Supervision  Discharge Recommendations: Home with:  DC Home with:: Family Support, First Floor Setup, Home Physical Therapy   Eating: Independent  Grooming: Supervision  Bathing: Minimal Assistance  Bathing: Minimal Assistance  Upper Body Dressing: Supervision  Lower Body Dressing: Minimal Assistance  Toileting: Supervision  Tub/Shower Transfer: Supervision  Toilet Transfer: Supervision  Cognition: Exceptions to WNL  Cognition: Decreased Comprehension  Orientation: Person, Place, Time, Situation               Physical Exam  Vitals reviewed.   Constitutional:       General: He is not in acute distress.  HENT:      Head: Normocephalic and atraumatic.      Right Ear: External ear normal.      Left Ear: External ear normal.      Nose: Nose  normal. No rhinorrhea.      Mouth/Throat:      Mouth: Mucous membranes are moist.      Pharynx: Oropharynx is clear.   Eyes:      General: No scleral icterus.  Cardiovascular:      Rate and Rhythm: Normal rate.      Pulses: Normal pulses.   Pulmonary:      Effort: Pulmonary effort is normal. No respiratory distress.   Abdominal:      General: There is no distension.      Palpations: Abdomen is soft.   Musculoskeletal:      Right lower leg: No edema.      Left lower leg: No edema.   Skin:     General: Skin is warm and dry.   Neurological:      Mental Status: He is alert and oriented to person, place, and time.      Sensory: Sensory deficit present.   Psychiatric:         Mood and Affect: Mood normal.         Behavior: Behavior normal.           Scheduled Meds:  Current Facility-Administered Medications   Medication Dose Route Frequency Provider Last Rate    acetaminophen  975 mg Oral Q8H [facility] Damian Cerda MD      allopurinol  100 mg Oral Daily Damian Cerda MD      aluminum-magnesium hydroxide-simethicone  30 mL Oral Q4H PRN GRANT Church      apixaban  5 mg Oral BID Damian Cerda MD      atorvastatin  80 mg Oral Daily With Dinner Damian Cerda MD      bisacodyl  10 mg Rectal Daily PRN Michelle Landa, DO      calcium carbonate  500 mg Oral Daily PRN GRANT Church      docusate sodium  100 mg Oral BID Michelle T Landa, DO      epoetin jessica  8,000 Units Intravenous After Dialysis German Linton MD      gabapentin  100 mg Oral Once per day on Monday Wednesday Friday Damian Cerda MD      lidocaine  1 patch Topical Daily PRN Damian Cerda MD      melatonin  6 mg Oral HS Jayden Rowland MD      methocarbamol  1,000 mg Oral Q8H [facility] Jayden Rowland MD      naloxone  0.04 mg Intravenous Q1MIN PRN Damian Cerda MD      NIFEdipine  30 mg Oral After Dinner Damian Cerda MD      ondansetron  4 mg Oral Q6H PRN Gino Austin PA-C      oxyCODONE  5 mg Oral Q4H PRN Damian Cerda MD      Or    oxyCODONE   10 mg Oral Q4H PRN Damian Cerda MD      oxyCODONE  2.5 mg Oral Q8H PRN Jayden Rowland MD      polyethylene glycol  17 g Oral Daily PRN Michelle Landa,       senna  2 tablet Oral Daily With Lunch Michelle Landa DO      sevelamer  1,600 mg Oral TID With Meals Damian Cerda MD      white petrolatum-mineral oil   Topical TID PRN Michelle Landa DO           Lab Results: I have reviewed the following results:  Results from last 7 days   Lab Units 04/29/25  1350   HEMOGLOBIN g/dL 8.0*   HEMATOCRIT % 26.3*   WBC Thousand/uL 7.55   PLATELETS Thousands/uL 224     Results from last 7 days   Lab Units 04/29/25  1350   BUN mg/dL 60*   SODIUM mmol/L 132*   POTASSIUM mmol/L 5.5*   CHLORIDE mmol/L 96   CREATININE mg/dL 8.38*                Santos Guan DO  Physical Medicine and Rehabilitation  [facility]    I have spent a total time of 35 minutes in caring for this patient on the day of the visit/encounter including Counseling / Coordination of care, Documenting in the medical record, Reviewing/placing orders in the medical record (including tests, medications, and/or procedures), and Communicating with other healthcare professionals .

## 2025-04-29 NOTE — ASSESSMENT & PLAN NOTE
Volume mediated  Blood pressure higher this morning we will monitor with ultrafiltration on dialysis.  UF on HD keep him at his dry weight  Low-sodium diet   Nifedipine 30 mg daily   Continue ultrafiltration with dialysis.

## 2025-04-29 NOTE — ASSESSMENT & PLAN NOTE
> 4/14: Hgb 7.6  > 4/15: Hgb 7.2 - Discussed with IM who is coordinating with Nephrology; they will increase his Epoetin jessica  > 4/17: Hgb 7.7  > 4/29: Hgb 8.0    - Trend Hgb on CBC  - Epoetin jessica with dialysis  - Transfusions per IM, appreciate their recommendations

## 2025-04-29 NOTE — PROGRESS NOTES
Progress Note - Nephrology   Name: Naresh Carpio 64 y.o. male I MRN: [id number]  Unit/Bed#: -01 I Date of Admission: 4/11/2025   Date of Service: 4/29/2025 I Hospital Day: 18     Assessment & Plan  ESRD (end stage renal disease) (Formerly [facility])  #ESRD on HD TTS:  Dialysis unit/days: [facility]  Access: PermCath  Last hemodialysis was on April 26th, which patient cut treatment short  Educated on completing full treatment to help facilitate better volume removal.  Plan for dialysis this afternoon.  Sodium down to 129, volume mediated monitor with ultrafiltration on dialysis.  Primary hypertension  Volume mediated  Blood pressure higher this morning we will monitor with ultrafiltration on dialysis.  UF on HD keep him at his dry weight  Low-sodium diet   Nifedipine 30 mg daily   Continue ultrafiltration with dialysis.  Anemia due to chronic kidney disease, on chronic dialysis (Formerly [facility])  Continue SURINDER  Last hemoglobin stable at 8.4    Traumatic retroperitoneal hematoma  S/p IR embolization  Hemoglobin stable  Secondary hyperparathyroidism (Formerly [facility])  Sevelamer with meals 3 times daily   Continue renal diet    PAD (peripheral artery disease) (Formerly [facility])      I have reviewed the nephrology recommendations including assessment and plan, with patient, and we are in agreement with renal plan including the information outlined above. Nephrology service will follow.    Subjective   Brief History of Admission - 64-year-old male with a history of end-stage renal disease currently in the rehabilitation unit after being treated for traumatic retroperitoneal hemorrhage.     No chest pain no shortness of breath some discomfort.    Objective :  Temp:  [97.7 °F (36.5 °C)-97.8 °F (36.6 °C)] 97.8 °F (36.6 °C)  HR:  [60-67] 67  BP: (151-183)/(61-68) 183/61  Resp:  [16-17] 16  SpO2:  [98 %-99 %] 98 %  O2 Device: None (Room air)    Current Weight: Weight - Scale: 95.4 kg (210 lb 4.8 oz)  First Weight: Weight - Scale: 98.9 kg (218 lb)  I/O         04/27  0701  04/28 0700 04/28 0701  04/29 0700 04/29 0701  04/30 0700    P.O. 420 657     I.V. (mL/kg)       Total Intake(mL/kg) 420 (4.5) 657 (6.9)     Urine (mL/kg/hr) 100 (0) 100 (0)     Other       Total Output 100 100     Net +320 +557            Unmeasured Urine Occurrence  1 x           Physical Exam  Vitals and nursing note reviewed.   Constitutional:       General: He is not in acute distress.     Appearance: He is well-developed. He is not diaphoretic.   HENT:      Head: Normocephalic and atraumatic.   Eyes:      General: No scleral icterus.     Pupils: Pupils are equal, round, and reactive to light.   Cardiovascular:      Rate and Rhythm: Normal rate and regular rhythm.      Heart sounds: Normal heart sounds. No murmur heard.     No friction rub. No gallop.   Pulmonary:      Effort: Pulmonary effort is normal. No respiratory distress.      Breath sounds: Normal breath sounds. No wheezing or rales.   Chest:      Chest wall: No tenderness.   Abdominal:      General: Bowel sounds are normal. There is no distension.      Palpations: Abdomen is soft.      Tenderness: There is no abdominal tenderness. There is no rebound.   Musculoskeletal:         General: Normal range of motion.      Cervical back: Normal range of motion and neck supple.   Skin:     Findings: No rash.   Neurological:      Mental Status: He is alert and oriented to person, place, and time.         Medications:    Current Facility-Administered Medications:     acetaminophen (TYLENOL) tablet 975 mg, 975 mg, Oral, Q8H KEMAL, Damian Cerda MD, 975 mg at 04/29/25 0542    allopurinol (ZYLOPRIM) tablet 100 mg, 100 mg, Oral, Daily, Damian Cerda MD, 100 mg at 04/29/25 0750    aluminum-magnesium hydroxide-simethicone (MAALOX) oral suspension 30 mL, 30 mL, Oral, Q4H PRN, GRANT Church, 30 mL at 04/28/25 2121    apixaban (ELIQUIS) tablet 5 mg, 5 mg, Oral, BID, Damian Cerda MD, 5 mg at 04/27/25 1700    atorvastatin (LIPITOR) tablet 80 mg, 80 mg, Oral,  Daily With Dinner, Damian Cerda MD, 80 mg at 04/28/25 1727    bisacodyl (DULCOLAX) rectal suppository 10 mg, 10 mg, Rectal, Daily PRN, Michelle Ngoel, DO, 10 mg at 04/18/25 0000    calcium carbonate (TUMS) chewable tablet 500 mg, 500 mg, Oral, Daily PRN, GRANT Church, 500 mg at 04/28/25 1155    docusate sodium (COLACE) capsule 100 mg, 100 mg, Oral, BID, Michelle T Landa, DO, 100 mg at 04/29/25 0749    epoetin jessica (EPOGEN,PROCRIT) injection 8,000 Units, 8,000 Units, Intravenous, After Dialysis, German Linton MD, 8,000 Units at 04/26/25 0850    gabapentin (NEURONTIN) capsule 100 mg, 100 mg, Oral, Once per day on Monday Wednesday Friday, Damian Cerda MD, 100 mg at 04/28/25 0814    lidocaine (LIDODERM) 5 % patch 1 patch, 1 patch, Topical, Daily PRN, Damian Cerda MD, 1 patch at 04/18/25 2135    melatonin tablet 6 mg, 6 mg, Oral, HS, Jayden Rowland MD, 6 mg at 04/28/25 2119    methocarbamol (ROBAXIN) tablet 1,000 mg, 1,000 mg, Oral, Q8H KEMAL, Jayden Rowland MD, 1,000 mg at 04/29/25 0541    naloxone (NARCAN) 0.04 mg/mL syringe 0.04 mg, 0.04 mg, Intravenous, Q1MIN PRN, Damian Cerda MD    NIFEdipine (PROCARDIA XL) 24 hr tablet 30 mg, 30 mg, Oral, After Dinner, Damian Cerda MD, 30 mg at 04/28/25 1727    ondansetron (ZOFRAN-ODT) dispersible tablet 4 mg, 4 mg, Oral, Q6H PRN, Gino Austin PA-C, 4 mg at 04/20/25 0023    oxyCODONE (ROXICODONE) IR tablet 5 mg, 5 mg, Oral, Q4H PRN, 5 mg at 04/29/25 0558 **OR** oxyCODONE (ROXICODONE) immediate release tablet 10 mg, 10 mg, Oral, Q4H PRN, Damian Cerda MD, 10 mg at 04/29/25 0106    oxyCODONE (ROXICODONE) split tablet 2.5 mg, 2.5 mg, Oral, Q8H PRN, Jayden Rowland MD, 2.5 mg at 04/29/25 0749    polyethylene glycol (MIRALAX) packet 17 g, 17 g, Oral, Daily PRN, Michelle Landa DO, 17 g at 04/26/25 1259    senna (SENOKOT) tablet 17.2 mg, 2 tablet, Oral, Daily With Lunch, Michelle Landa DO, 17.2 mg at 04/28/25 1106    sevelamer (RENAGEL) tablet 1,600 mg, 1,600  "mg, Oral, TID With Meals, Damian Cerda MD, 1,600 mg at 04/29/25 0853    white petrolatum-mineral oil (EUCERIN,HYDROCERIN) cream, , Topical, TID PRN, Michelle Landa DO      Lab Results: I have reviewed the following results:  Results from last 7 days   Lab Units 04/22/25  1237   WBC Thousand/uL 7.27   HEMOGLOBIN g/dL 8.4*   HEMATOCRIT % 26.4*   PLATELETS Thousands/uL 327   POTASSIUM mmol/L 4.3   CHLORIDE mmol/L 89*   CO2 mmol/L 29   BUN mg/dL 48*   CREATININE mg/dL 7.18*   CALCIUM mg/dL 8.9       Administrative Statements   I have spent a total time of 15 minutes in caring for this patient on the day of the visit/encounter including Counseling / Coordination of care, Documenting in the medical record, Reviewing/placing orders in the medical record (including tests, medications, and/or procedures), and Obtaining or reviewing history  .  Portions of the record may have been created with voice recognition software. Occasional wrong word or \"sound a like\" substitutions may have occurred due to the inherent limitations of voice recognition software. Read the chart carefully and recognize, using context, where substitutions have occurred.If you have any questions, please contact the dictating provider.  "

## 2025-04-29 NOTE — PLAN OF CARE
Post-Dialysis RN Treatment Note    Blood Pressure:  Pre 118/66 mm/Hg  Post 136/64 mmHg   EDW:  96.5 kg    Weight:  Pre 95.9 kg   Post 93.7 kg   Mode of weight measurement: Standing Scale   Volume Removed:  1812 ml    Treatment duration: 194 minutes    NS given:  No    Treatment shortened Yes, describe: 16 minutes at patient request due to back pain   Medications given during Rx: Oxy 10 mg, Epogen 8000 units   Estimated Kt/V:  1.05   Access type: Permacath/TDC   Needle Gauge:  NA   Access Issues: Yes, describe: lines reversed for optimal BFR     Report called to primary nurse:   Yes Lilian Stover RN        Current hemodialysis plan of care is to remove a total of 2500 ml as tolerated for a net of 2 liters over a 3 1/2 hour treatment.  Monitor vital signs every 15 minutes while on treatment for patient safety.  Utilize a 3 K+ bath for serum potassium of 4.3 to maintain electrolyte balance.  Report received from Olga Romero RN.  Plan reviewed with Dr Bazan at bedside.        Problem: METABOLIC, FLUID AND ELECTROLYTES - ADULT  Goal: Electrolytes maintained within normal limits  Description: INTERVENTIONS:- Monitor labs and assess patient for signs and symptoms of electrolyte imbalances- Administer electrolyte replacement as ordered- Monitor response to electrolyte replacements, including repeat lab results as appropriate- Instruct patient on fluid and nutrition as appropriate  Outcome: Progressing  Goal: Fluid balance maintained  Description: INTERVENTIONS:- Monitor labs - Monitor I/O and WT- Instruct patient on fluid and nutrition as appropriate- Assess for signs & symptoms of volume excess or deficit  Outcome: Progressing

## 2025-04-29 NOTE — ASSESSMENT & PLAN NOTE
Wt Readings from Last 3 Encounters:   04/29/25 95.4 kg (210 lb 4.8 oz)   04/11/25 98.5 kg (217 lb 2.5 oz)   03/24/25 107 kg (234 lb 12.6 oz)     - Most recent TTE from 10/9/24 with EF 60-65%, grade 1 diastolic dysfunction  - Volume management with HD per nephrology  - Monitor weights  - Appreciate medicine and nephrology recommendations and management

## 2025-04-29 NOTE — ASSESSMENT & PLAN NOTE
"On no rate control medications  Examines RRR  Anticoagulation with Eliquis as at home but he doesn't want to take it because he is worried it will make his right eye vision worse.  I d/w on call Optho Dr Diaz = \"The risk is not quantified in the literature. Dr edmond's note suggests a diagnosis of proliferative diabetic retinopathy and bilateral and recurrent vitreous hemorrhage. So he is at risk for - and has had hemorrhage - whether or not he is on eliquis (assuming dr edmond is correct).     I would always recommend life and death are more important so to do what you recommend re his systemic condition, and then to follow up with dr edmond as he recommended so a real exam can be done in the clinic\".   Explained all this to him and he understands  He had been taking the Eliquis again and now refusing it because he is afraid of his foot bleeding.  He says \"no point in discussing it\"; I d/w him but again refuses to take it  "

## 2025-04-29 NOTE — ASSESSMENT & PLAN NOTE
4/15- Adjusted bowel medications: Colace 100mg BID, Senna 17.2mg with lunch and PRN Miralax/Suppos  4/17- No BM since 4/13. Lactulose this evening if no BM after lunch. Can consider Relistor if no success for opioid-inducted constipation  4/18- small hard BMs x2 yesterday after lactulose and suppository. Continue bowel regimen  >BM 4/22 but required Relistor and suppository/lactulose  > Last BM 4/29    - Will follow BMs and adjust bowel regimen to target soft, formed stools q1-2 days, per pt's regular schedule.

## 2025-04-29 NOTE — PROGRESS NOTES
04/29/25 1000   Pain Assessment   Pain Assessment Tool 0-10   Pain Score 4   Pain Location/Orientation Location: Back   Hospital Pain Intervention(s) Ambulation/increased activity   Subjective   Subjective very tired to start but once alert, cooperative and willing to do therapy   Roll Left and Right   Comment avpids sidelying due to abdominal/back pain   Reason if not Attempted Safety concerns   Roll Left and Right CARE Score 88   Sit to Lying   Type of Assistance Needed Independent   Sit to Lying CARE Score 6   Lying to Sitting on Side of Bed   Type of Assistance Needed Independent   Lying to Sitting on Side of Bed CARE Score 6   Bed-Chair Transfer   Type of Assistance Needed Independent   Chair/Bed-to-Chair Transfer CARE Score 6   Car Transfer   Type of Assistance Needed Set-up / clean-up   Comment pt helps his legs in/out of car with his hands   Car Transfer CARE Score 5   Walk 10 Feet   Type of Assistance Needed Independent;Adaptive equipment   Comment RW   Walk 10 Feet CARE Score 6   Walk 50 Feet with Two Turns   Type of Assistance Needed Independent;Adaptive equipment   Comment RW   Walk 50 Feet with Two Turns CARE Score 6   Walk 150 Feet   Type of Assistance Needed Independent;Adaptive equipment   Comment RW   Walk 150 Feet CARE Score 6   Walking 10 Feet on Uneven Surfaces   Type of Assistance Needed Supervision;Adaptive equipment   Comment RW across mat on floor   Walking 10 Feet on Uneven Surfaces CARE Score 4   Ambulation   Primary Mode of Locomotion Prior to Admission Walk   Distance Walked (feet) 250 ft  (x3)   Assist Device Roller Walker   Does the patient walk? 2. Yes   Wheelchair mobility   Does the patient use a wheelchair? 0. No   Curb or Single Stair   Style negotiated Single stair   Type of Assistance Needed Independent   1 Step (Curb) CARE Score 6   4 Steps   Type of Assistance Needed Supervision   4 Steps CARE Score 4   12 Steps   Type of Assistance Needed Supervision   12 Steps CARE Score 4  "  Picking Up Object   Type of Assistance Needed Independent;Adaptive equipment   Comment using a reacher, also reports he has a \"dust pan with long handle\"that he uses at home to get things offf the floor   Picking Up Object CARE Score 6   Toilet Transfer   Type of Assistance Needed Independent   Toilet Transfer CARE Score 6   Toilet Transfer   Surface Assessed Standard Toilet   Therapeutic Interventions   Strengthening LAQ, standing hip flexion   Other Comments   Comments (S)  Pt concerned about leaving if he is \"still bleeding and can't see to know he is\".   Assessment   Treatment Assessment Pt asleep upon enetring, slow to arouse and become alert, but once awake cooperative and participating well. Pt refusing to wear post-op shoes and sneakers remain too tight. Pt safely ambulating in room and halls with RW. Manages stairs and car transfers well. Pt reporting he will have his ex-wife help him purchase a walker tray from [employer], possibly a reacher if he feels he needs. No LOB with any mobility. Manages LSO Independently.   Barriers to Discharge None   PT Therapy Minutes   PT Time In 1000   PT Time Out 1130   PT Total Time (minutes) 90   PT Mode of treatment - Individual (minutes) 90   PT Mode of treatment - Concurrent (minutes) 0   PT Mode of treatment - Group (minutes) 0   PT Mode of treatment - Co-treat (minutes) 0   PT Mode of Treatment - Total time(minutes) 90 minutes   PT Cumulative Minutes 1230   Therapy Time missed   Time missed? No     "

## 2025-04-29 NOTE — PROGRESS NOTES
"OT daily tx note     04/29/25 0700   Pain Assessment   Pain Assessment Tool 0-10   Pain Score 8   Pain Location/Orientation Location: Back   Hospital Pain Intervention(s) Rest;Emotional support  (NSG notified and supplied w/ AM meds)   Restrictions/Precautions   Precautions Cognitive;Fall Risk;Fluid restriction;Spinal precautions;Visual deficit   Weight Bearing Restrictions No   ROM Restrictions Yes  (lumbar spinal prec)   Braces or Orthoses LSO   Lifestyle   Autonomy \"we can talk about all of this when my sister comes in tmrw\"   Eating   Type of Assistance Needed Independent   Physical Assistance Level No physical assistance   Comment seated in recliner   Eating CARE Score 6   Oral Hygiene   Type of Assistance Needed Independent   Physical Assistance Level No physical assistance   Comment seated in recliner   Oral Hygiene CARE Score 6   Shower/Bathe Self   Comment declined SB despite multiple attempts; pt doesn't feel need to do so since washing up recently. pt reports he does not take SB as much as he does here. provided pt w/ long handled loofah and edu on use for LB/back. pt reports owning one but never uses at home so will use here at [facility] and can take home for use. requested to shave and completed w/ A from OT due to visual imp   Reason if not Attempted Refused to perform   Shower/Bathe Self CARE Score 7   Upper Body Dressing   Type of Assistance Needed Independent   Physical Assistance Level No physical assistance   Comment open to changing into clean shirt and ind w/ LSO brace   Upper Body Dressing CARE Score 6   Lower Body Dressing   Comment declined to complete due to pain and already changed yesterday; explained that he said yesterday he would complete today but pt slightly frustrated as he didn't sleep well and c/o pain   Reason if not Attempted Refused to perform   Lower Body Dressing CARE Score 7   Putting On/Taking Off Footwear   Comment explaining he does not want to aggravate feet due to bleeding " from past few days; no bleeding noted today   Reason if not Attempted Refused to perform   Putting On/Taking Off Footwear CARE Score 7   Sit to Stand   Type of Assistance Needed Independent   Physical Assistance Level No physical assistance   Comment w/ RW   Sit to Stand CARE Score 6   Bed-Chair Transfer   Type of Assistance Needed Independent   Physical Assistance Level No physical assistance   Comment w/ RW; daytime IRP   Chair/Bed-to-Chair Transfer CARE Score 6   Cognition   Overall Cognitive Status Impaired   Arousal/Participation Cooperative   Attention Attends with cues to redirect   Orientation Level Oriented to person;Oriented to place   Memory Decreased recall of precautions   Following Commands Follows one step commands without difficulty   Additional Activities   Additional Activities Other (Comment)   Additional Activities Comments Per PT, Darcy note yesterday, OT f/u about plan for purchasing AE for home including universal walker tray, LHR, wide sock aid, pill box, and shoe horn. Pt explained that he would like to discuss this tmrw when his sister arrives as he feels stressed about going home dipika. OT explained possibility fo being taken home tmrw and suggested being proactive about ordering equipment today prior to leaving bc we can help him and it could arrive earlier. Pt reports that he does not need the equipment immediately. However pt cannot dress himself w/o AE and would benefit from pill box for organization. OT explained this and pt cont to state his sister can help tmrw and/or ex wife can assist once he is home. OT offered other options such as assisting w/ purchasing on the computer here or on his phone or sending links to sister or ex-wife. Pt cont to refuse.   Activity Tolerance   Activity Tolerance Patient limited by pain   Assessment   Treatment Assessment Pt engaged in skilled OT session with focus on ADL Retraining , LB Dressing, UB dressing, IADL training , Functional Transfers, DME  training/education, Energy conservation training/education, healthy coping education, Leisure and social pursuits, and community re-integration. Pt is limited by weakness, impaired balance, decreased endurance, increased fall risk, decreased ADLS, decreased IADLS, pain, decreased activity tolerance, impaired judgement, and visual deficits. Pt initially refusing due to offer of ADL and discussion of DC planning. Pt c/o pain and bleeding from foot even tho no bleeding noted. Pt cont to have difficulty sleeping and low frustration aaron w/ discussion about home. Sister will be in for family meeting and tentative DC tmrw pending HD transport. OT services are warranted to address above barriers.   Prognosis Good   Problem List Decreased strength;Decreased endurance;Impaired balance;Decreased mobility;Orthopedic restrictions;Pain;Impaired vision   Barriers to Discharge Inaccessible home environment;Decreased caregiver support   Plan   Treatment/Interventions ADL retraining;Functional transfer training;Endurance training;Therapeutic exercise;Patient/family training   Progress Progressing toward goals   OT Therapy Minutes   OT Time In 0700   OT Time Out 0830   OT Total Time (minutes) 90   OT Mode of treatment - Individual (minutes) 90   OT Mode of treatment - Concurrent (minutes) 0   OT Mode of treatment - Group (minutes) 0   OT Mode of treatment - Co-treat (minutes) 0   OT Mode of Treatment - Total time(minutes) 90 minutes   OT Cumulative Minutes 1220   Therapy Time missed   Time missed? No

## 2025-04-29 NOTE — PLAN OF CARE
Problem: PAIN - ADULT  Goal: Verbalizes/displays adequate comfort level or baseline comfort level  Description: Interventions:- Encourage patient to monitor pain and request assistance- Assess pain using appropriate pain scale- Administer analgesics based on type and severity of pain and evaluate response- Implement non-pharmacological measures as appropriate and evaluate response- Consider cultural and social influences on pain and pain management- Notify physician/advanced practitioner if interventions unsuccessful or patient reports new pain  Outcome: Progressing     Problem: INFECTION - ADULT  Goal: Absence or prevention of progression during hospitalization  Description: INTERVENTIONS:- Assess and monitor for signs and symptoms of infection- Monitor lab/diagnostic results- Monitor all insertion sites, i.e. indwelling lines, tubes, and drains- Monitor endotracheal if appropriate and nasal secretions for changes in amount and color- [place] appropriate cooling/warming therapies per order- Administer medications as ordered- Instruct and encourage patient and family to use good hand hygiene technique- Identify and instruct in appropriate isolation precautions for identified infection/condition  Outcome: Progressing  Goal: Absence of fever/infection during neutropenic period  Description: INTERVENTIONS:- Monitor WBC  Outcome: Progressing     Problem: SAFETY ADULT  Goal: Patient will remain free of falls  Description: INTERVENTIONS:- Educate patient/family on patient safety including physical limitations- Instruct patient to call for assistance with activity - Consult OT/PT to assist with strengthening/mobility - Keep Call bell within reach- Keep bed low and locked with side rails adjusted as appropriate- Keep care items and personal belongings within reach- Initiate and maintain comfort rounds- Make Fall Risk Sign visible to staff- Offer Toileting every 2 Hours, in advance of need- Initiate/Maintain alarm-  Obtain necessary fall risk management equipment: - Apply yellow socks and bracelet for high fall risk patients- Consider moving patient to room near nurses station  Outcome: Progressing  Goal: Maintain or return to baseline ADL function  Description: INTERVENTIONS:-  Assess patient's ability to carry out ADLs; assess patient's baseline for ADL function and identify physical deficits which impact ability to perform ADLs (bathing, care of mouth/teeth, toileting, grooming, dressing, etc.)- Assess/evaluate cause of self-care deficits - Assess range of motion- Assess patient's mobility; develop plan if impaired- Assess patient's need for assistive devices and provide as appropriate- Encourage maximum independence but intervene and supervise when necessary- Involve family in performance of ADLs- Assess for home care needs following discharge - Consider OT consult to assist with ADL evaluation and planning for discharge- Provide patient education as appropriate  Outcome: Progressing  Goal: Maintains/Returns to pre admission functional level  Description: INTERVENTIONS:- Perform AM-PAC 6 Click Basic Mobility/ Daily Activity assessment daily.- Set and communicate daily mobility goal to care team and patient/family/caregiver. - Collaborate with rehabilitation services on mobility goals if consulted- Perform Range of Motion 3 times a day.- Reposition patient every 2 hours.- Dangle patient 3 times a day- Stand patient 3 times a day- Ambulate patient 3 times a day- Out of bed to chair 3 times a day - Out of bed for meals 3 times a day- Out of bed for toileting- Record patient progress and toleration of activity level   Outcome: Progressing     Problem: DISCHARGE PLANNING  Goal: Discharge to home or other facility with appropriate resources  Description: INTERVENTIONS:- Identify barriers to discharge w/patient and caregiver- Arrange for needed discharge resources and transportation as appropriate- Identify discharge learning  needs (meds, wound care, etc.)- Arrange for interpretive services to assist at discharge as needed- Refer to Case Management Department for coordinating discharge planning if the patient needs post-hospital services based on physician/advanced practitioner order or complex needs related to functional status, cognitive ability, or social support system  Outcome: Progressing     Problem: Prexisting or High Potential for Compromised Skin Integrity  Goal: Skin integrity is maintained or improved  Description: INTERVENTIONS:- Identify patients at risk for skin breakdown- Assess and monitor skin integrity- Assess and monitor nutrition and hydration status- Monitor labs - Assess for incontinence - Turn and reposition patient- Assist with mobility/ambulation- Relieve pressure over bony prominences- Avoid friction and shearing- Provide appropriate hygiene as needed including keeping skin clean and dry- Evaluate need for skin moisturizer/barrier cream- Collaborate with interdisciplinary team - Patient/family teaching- Consider wound care consult   Outcome: Progressing     Problem: METABOLIC, FLUID AND ELECTROLYTES - ADULT  Goal: Electrolytes maintained within normal limits  Description: INTERVENTIONS:- Monitor labs and assess patient for signs and symptoms of electrolyte imbalances- Administer electrolyte replacement as ordered- Monitor response to electrolyte replacements, including repeat lab results as appropriate- Instruct patient on fluid and nutrition as appropriate  Outcome: Progressing  Goal: Fluid balance maintained  Description: INTERVENTIONS:- Monitor labs - Monitor I/O and WT- Instruct patient on fluid and nutrition as appropriate- Assess for signs & symptoms of volume excess or deficit  Outcome: Progressing     Problem: Nutrition/Hydration-ADULT  Goal: Nutrient/Hydration intake appropriate for improving, restoring or maintaining nutritional needs  Description: Monitor and assess patient's nutrition/hydration  status for malnutrition. Collaborate with interdisciplinary team and initiate plan and interventions as ordered.  Monitor patient's weight and dietary intake as ordered or per policy. Utilize nutrition screening tool and intervene as necessary. Determine patient's food preferences and provide high-protein, high-caloric foods as appropriate. INTERVENTIONS:- Monitor oral intake, urinary output, labs, and treatment plans- Assess nutrition and hydration status and recommend course of action- Evaluate amount of meals eaten- Assist patient with eating if necessary - Allow adequate time for meals- Recommend/ encourage appropriate diets, oral nutritional supplements, and vitamin/mineral supplements- Order, calculate, and assess calorie counts as needed- Recommend, monitor, and adjust tube feedings and TPN/PPN based on assessed needs- Assess need for intravenous fluids- Provide specific nutrition/hydration education as appropriate- Include patient/family/caregiver in decisions related to nutrition  Outcome: Progressing     Problem: Nutrition/Hydration-ADULT  Goal: Nutrient/Hydration intake appropriate for improving, restoring or maintaining nutritional needs  Description: Monitor and assess patient's nutrition/hydration status for malnutrition. Collaborate with interdisciplinary team and initiate plan and interventions as ordered.  Monitor patient's weight and dietary intake as ordered or per policy. Utilize nutrition screening tool and intervene as necessary. Determine patient's food preferences and provide high-protein, high-caloric foods as appropriate. INTERVENTIONS:- Monitor oral intake, urinary output, labs, and treatment plans- Assess nutrition and hydration status and recommend course of action- Evaluate amount of meals eaten- Assist patient with eating if necessary - Allow adequate time for meals- Recommend/ encourage appropriate diets, oral nutritional supplements, and vitamin/mineral supplements- Order, calculate,  and assess calorie counts as needed- Recommend, monitor, and adjust tube feedings and TPN/PPN based on assessed needs- Assess need for intravenous fluids- Provide specific nutrition/hydration education as appropriate- Include patient/family/caregiver in decisions related to nutrition  Outcome: Progressing

## 2025-04-29 NOTE — PLAN OF CARE
Problem: PAIN - ADULT  Goal: Verbalizes/displays adequate comfort level or baseline comfort level  Description: Interventions:- Encourage patient to monitor pain and request assistance- Assess pain using appropriate pain scale- Administer analgesics based on type and severity of pain and evaluate response- Implement non-pharmacological measures as appropriate and evaluate response- Consider cultural and social influences on pain and pain management- Notify physician/advanced practitioner if interventions unsuccessful or patient reports new pain  Outcome: Progressing     Problem: INFECTION - ADULT  Goal: Absence or prevention of progression during hospitalization  Description: INTERVENTIONS:- Assess and monitor for signs and symptoms of infection- Monitor lab/diagnostic results- Monitor all insertion sites, i.e. indwelling lines, tubes, and drains- Monitor endotracheal if appropriate and nasal secretions for changes in amount and color- [place] appropriate cooling/warming therapies per order- Administer medications as ordered- Instruct and encourage patient and family to use good hand hygiene technique- Identify and instruct in appropriate isolation precautions for identified infection/condition  Outcome: Progressing  Goal: Absence of fever/infection during neutropenic period  Description: INTERVENTIONS:- Monitor WBC  Outcome: Progressing     Problem: SAFETY ADULT  Goal: Patient will remain free of falls  Description: INTERVENTIONS:- Educate patient/family on patient safety including physical limitations- Instruct patient to call for assistance with activity - Consult OT/PT to assist with strengthening/mobility - Keep Call bell within reach- Keep bed low and locked with side rails adjusted as appropriate- Keep care items and personal belongings within reach- Initiate and maintain comfort rounds- Make Fall Risk Sign visible to staff- Offer Toileting every 2 Hours, in advance of need- Initiate/Maintain alarm-  Obtain necessary fall risk management equipment: - Apply yellow socks and bracelet for high fall risk patients- Consider moving patient to room near nurses station  Outcome: Progressing  Goal: Maintain or return to baseline ADL function  Description: INTERVENTIONS:-  Assess patient's ability to carry out ADLs; assess patient's baseline for ADL function and identify physical deficits which impact ability to perform ADLs (bathing, care of mouth/teeth, toileting, grooming, dressing, etc.)- Assess/evaluate cause of self-care deficits - Assess range of motion- Assess patient's mobility; develop plan if impaired- Assess patient's need for assistive devices and provide as appropriate- Encourage maximum independence but intervene and supervise when necessary- Involve family in performance of ADLs- Assess for home care needs following discharge - Consider OT consult to assist with ADL evaluation and planning for discharge- Provide patient education as appropriate  Outcome: Progressing  Goal: Maintains/Returns to pre admission functional level  Description: INTERVENTIONS:- Perform AM-PAC 6 Click Basic Mobility/ Daily Activity assessment daily.- Set and communicate daily mobility goal to care team and patient/family/caregiver. - Collaborate with rehabilitation services on mobility goals if consulted- Perform Range of Motion 3 times a day.- Reposition patient every 2 hours.- Dangle patient 3 times a day- Stand patient 3 times a day- Ambulate patient 3 times a day- Out of bed to chair 3 times a day - Out of bed for meals 3 times a day- Out of bed for toileting- Record patient progress and toleration of activity level   Outcome: Progressing     Problem: DISCHARGE PLANNING  Goal: Discharge to home or other facility with appropriate resources  Description: INTERVENTIONS:- Identify barriers to discharge w/patient and caregiver- Arrange for needed discharge resources and transportation as appropriate- Identify discharge learning  needs (meds, wound care, etc.)- Arrange for interpretive services to assist at discharge as needed- Refer to Case Management Department for coordinating discharge planning if the patient needs post-hospital services based on physician/advanced practitioner order or complex needs related to functional status, cognitive ability, or social support system  Outcome: Progressing     Problem: Prexisting or High Potential for Compromised Skin Integrity  Goal: Skin integrity is maintained or improved  Description: INTERVENTIONS:- Identify patients at risk for skin breakdown- Assess and monitor skin integrity- Assess and monitor nutrition and hydration status- Monitor labs - Assess for incontinence - Turn and reposition patient- Assist with mobility/ambulation- Relieve pressure over bony prominences- Avoid friction and shearing- Provide appropriate hygiene as needed including keeping skin clean and dry- Evaluate need for skin moisturizer/barrier cream- Collaborate with interdisciplinary team - Patient/family teaching- Consider wound care consult   Outcome: Progressing     Problem: METABOLIC, FLUID AND ELECTROLYTES - ADULT  Goal: Electrolytes maintained within normal limits  Description: INTERVENTIONS:- Monitor labs and assess patient for signs and symptoms of electrolyte imbalances- Administer electrolyte replacement as ordered- Monitor response to electrolyte replacements, including repeat lab results as appropriate- Instruct patient on fluid and nutrition as appropriate  Outcome: Progressing  Goal: Fluid balance maintained  Description: INTERVENTIONS:- Monitor labs - Monitor I/O and WT- Instruct patient on fluid and nutrition as appropriate- Assess for signs & symptoms of volume excess or deficit  Outcome: Progressing     Problem: Nutrition/Hydration-ADULT  Goal: Nutrient/Hydration intake appropriate for improving, restoring or maintaining nutritional needs  Description: Monitor and assess patient's nutrition/hydration  status for malnutrition. Collaborate with interdisciplinary team and initiate plan and interventions as ordered.  Monitor patient's weight and dietary intake as ordered or per policy. Utilize nutrition screening tool and intervene as necessary. Determine patient's food preferences and provide high-protein, high-caloric foods as appropriate. INTERVENTIONS:- Monitor oral intake, urinary output, labs, and treatment plans- Assess nutrition and hydration status and recommend course of action- Evaluate amount of meals eaten- Assist patient with eating if necessary - Allow adequate time for meals- Recommend/ encourage appropriate diets, oral nutritional supplements, and vitamin/mineral supplements- Order, calculate, and assess calorie counts as needed- Recommend, monitor, and adjust tube feedings and TPN/PPN based on assessed needs- Assess need for intravenous fluids- Provide specific nutrition/hydration education as appropriate- Include patient/family/caregiver in decisions related to nutrition  Outcome: Progressing     Problem: Nutrition/Hydration-ADULT  Goal: Nutrient/Hydration intake appropriate for improving, restoring or maintaining nutritional needs  Description: Monitor and assess patient's nutrition/hydration status for malnutrition. Collaborate with interdisciplinary team and initiate plan and interventions as ordered.  Monitor patient's weight and dietary intake as ordered or per policy. Utilize nutrition screening tool and intervene as necessary. Determine patient's food preferences and provide high-protein, high-caloric foods as appropriate. INTERVENTIONS:- Monitor oral intake, urinary output, labs, and treatment plans- Assess nutrition and hydration status and recommend course of action- Evaluate amount of meals eaten- Assist patient with eating if necessary - Allow adequate time for meals- Recommend/ encourage appropriate diets, oral nutritional supplements, and vitamin/mineral supplements- Order, calculate,  and assess calorie counts as needed- Recommend, monitor, and adjust tube feedings and TPN/PPN based on assessed needs- Assess need for intravenous fluids- Provide specific nutrition/hydration education as appropriate- Include patient/family/caregiver in decisions related to nutrition  Outcome: Progressing

## 2025-04-29 NOTE — ASSESSMENT & PLAN NOTE
Mechanical fall, slipped on ice while getting into his car going to dialysis  Denies any head injury, syncope, loss of consciousness.  Fall precautions  PT/OT   Prosthetic mitral valve stenosis

## 2025-04-29 NOTE — ASSESSMENT & PLAN NOTE
"- Reporting \"floater\" within the right eye with acute onset on 4/11/25 at mid-day while watching TV; describes a dark, \"spider\"-shaped floater that occupies a significant portion of the visual field, painless; has similar floaters in the left eye but smaller  - Ophthalmology consulted on [facility] admission by IM, per prior trauma surgery documentation, Dr. 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 Pt will follow up with ophtho immediately after discharge and for referral to a retina specialist  > 4/15- Pt thinks his R eye floater may be larger today, unable to clearly state or quantify symptoms. No other visual complaints of increased number of floaters, double/blurry vision, or bright flashes. Advised patient to inform us if symptoms change - will reach out to Ophthalmology accordingly  > 4/18- No changes noted in vision/floaters. His overall impaired vision continues to be a barrier for his ADLs/iADLs  > 4/20-24- Stable R eye floater    - Monitor for changes in vision  "

## 2025-04-30 PROCEDURE — 97530 THERAPEUTIC ACTIVITIES: CPT

## 2025-04-30 PROCEDURE — 99232 SBSQ HOSP IP/OBS MODERATE 35: CPT | Performed by: INTERNAL MEDICINE

## 2025-04-30 PROCEDURE — 99232 SBSQ HOSP IP/OBS MODERATE 35: CPT | Performed by: NURSE PRACTITIONER

## 2025-04-30 PROCEDURE — 99233 SBSQ HOSP IP/OBS HIGH 50: CPT | Performed by: STUDENT IN AN ORGANIZED HEALTH CARE EDUCATION/TRAINING PROGRAM

## 2025-04-30 PROCEDURE — 97110 THERAPEUTIC EXERCISES: CPT

## 2025-04-30 RX ADMIN — ACETAMINOPHEN 975 MG: 325 TABLET, FILM COATED ORAL at 06:11

## 2025-04-30 RX ADMIN — ACETAMINOPHEN 975 MG: 325 TABLET, FILM COATED ORAL at 21:11

## 2025-04-30 RX ADMIN — OXYCODONE HYDROCHLORIDE 10 MG: 10 TABLET ORAL at 15:26

## 2025-04-30 RX ADMIN — DOCUSATE SODIUM 100 MG: 100 CAPSULE, LIQUID FILLED ORAL at 07:58

## 2025-04-30 RX ADMIN — METHOCARBAMOL 1000 MG: 500 TABLET ORAL at 06:10

## 2025-04-30 RX ADMIN — SEVELAMER HYDROCHLORIDE 1600 MG: 800 TABLET ORAL at 11:39

## 2025-04-30 RX ADMIN — SEVELAMER HYDROCHLORIDE 1600 MG: 800 TABLET ORAL at 07:59

## 2025-04-30 RX ADMIN — GABAPENTIN 100 MG: 100 CAPSULE ORAL at 07:57

## 2025-04-30 RX ADMIN — METHOCARBAMOL 1000 MG: 500 TABLET ORAL at 21:11

## 2025-04-30 RX ADMIN — OXYCODONE HYDROCHLORIDE 10 MG: 10 TABLET ORAL at 01:25

## 2025-04-30 RX ADMIN — DOCUSATE SODIUM 100 MG: 100 CAPSULE, LIQUID FILLED ORAL at 16:47

## 2025-04-30 RX ADMIN — Medication 6 MG: at 21:11

## 2025-04-30 RX ADMIN — METHOCARBAMOL 1000 MG: 500 TABLET ORAL at 14:45

## 2025-04-30 RX ADMIN — ATORVASTATIN CALCIUM 80 MG: 80 TABLET, FILM COATED ORAL at 16:43

## 2025-04-30 RX ADMIN — NIFEDIPINE 30 MG: 30 TABLET, FILM COATED, EXTENDED RELEASE ORAL at 16:47

## 2025-04-30 RX ADMIN — CALCIUM CARBONATE (ANTACID) CHEW TAB 500 MG 500 MG: 500 CHEW TAB at 16:04

## 2025-04-30 RX ADMIN — ALLOPURINOL 100 MG: 100 TABLET ORAL at 07:58

## 2025-04-30 RX ADMIN — SENNOSIDES 17.2 MG: 8.6 TABLET, FILM COATED ORAL at 11:38

## 2025-04-30 RX ADMIN — SEVELAMER HYDROCHLORIDE 1600 MG: 800 TABLET ORAL at 16:47

## 2025-04-30 RX ADMIN — ACETAMINOPHEN 975 MG: 325 TABLET, FILM COATED ORAL at 14:45

## 2025-04-30 NOTE — PROGRESS NOTES
"   04/30/25 9356   Pain Assessment   Pain Assessment Tool 0-10   Pain Score   (pt did not rate pain, he did mention at end of session he was having discomfort in his L lower back, but that it was \"his normal\")   Pain Location/Orientation Orientation: Left;Orientation: Lower;Location: Back   Pain Radiating Towards L   Pain Onset/Description Onset: Ongoing   Effect of Pain on Daily Activities able to tolerate therapy, does not limit what activities we can do. he sometimes needs a warm up/therex to get going but otherwise is not limited   Patient's Stated Pain Goal No pain   Hospital Pain Intervention(s) Repositioned;Ambulation/increased activity;Rest   Restrictions/Precautions   Precautions Cognitive;Fall Risk;Fluid restriction;Pain;Spinal precautions   Weight Bearing Restrictions No   ROM Restrictions Yes  (spinal precautions)   Braces or Orthoses LSO   Cognition   Arousal/Participation Cooperative   Subjective   Subjective \"Family dynamics are hard man\"   Sit to Stand   Type of Assistance Needed Independent;Adaptive equipment   Comment to RW   Sit to Stand CARE Score 6   Bed-Chair Transfer   Type of Assistance Needed Independent   Comment w RW   Chair/Bed-to-Chair Transfer CARE Score 6   Transfer Bed/Chair/Wheelchair   Limitations Noted In Vision;LE Strength;Balance   Adaptive Equipment Roller Walker   Walk 10 Feet   Type of Assistance Needed Independent;Adaptive equipment   Comment RW   Walk 10 Feet CARE Score 6   Walk 50 Feet with Two Turns   Type of Assistance Needed Independent;Adaptive equipment   Comment RW   Walk 50 Feet with Two Turns CARE Score 6   Walk 150 Feet   Type of Assistance Needed Independent;Adaptive equipment   Comment RW   Walk 150 Feet CARE Score 6   Ambulation   Primary Mode of Locomotion Prior to Admission Walk   Distance Walked (feet) 150 ft   Assist Device Roller Walker   Gait Pattern Inconsistant Lisa;Slow Lisa;Decreased foot clearance;Forward Flexion;Wide GARO;Step through;Improper " "weight shift   Limitations Noted In Balance;Speed;Strength   Walk Assist Level Modified Independent   Does the patient walk? 2. Yes   Curb or Single Stair   Style negotiated Curb  (6\")   Type of Assistance Needed Physical assistance   Physical Assistance Level 25% or less   Comment completed 6\" curb step x8 with RW: 2x with Rin, seated rest, then retrialed with an additional 4 repetitions Rin-CGA, seated recuperative rest, an additional two were done and on last one pt had LOB requiring modA to recover and stated he felt that it was due to fatigue   1 Step (Curb) CARE Score 3   4 Steps   Type of Assistance Needed Supervision   4 Steps CARE Score 4   Stairs   Type Stairs  ( steps)   # of Steps 8   Weight Bearing Precautions Fall Risk   Assist Devices Single Rail;Bilateral Rail   Findings completed 6\"  steps 4 times for a total of 8 steps. 2 completed with BHR, 6 completed with BUE on LHR, non recpirocal pattern   Other Comments   Comments Family meeting today completed with pt, pt sister and significant other. PT edu on many aspects of mobility, CLOF, what pt needs assistance with and fall risk factors and how we have been working on these things in therapy. Edu family that we understand his barriers to his home: 8 NAZARIO with 1 HR, curb step, fall risk in the home and edu that at this time to dec risk of falls we are recommending RW, RW tray, and reacher to dec risk for falls. Pt sister significant other asked if pt could use a SPC, but family was edu that at this time the safest option for Ed is to utilize the walker for inc stability. Pt family expressed concern with patients past usage of the RW with them mentioning that he would walk too far from the walker and it would be \"way out in front of him.\" Discussed with family that this is something we have worked on with him and he has sig. improved with this to inc his overall safety. With rest of session, PT and pt demonstrated to pt sister and sig. " "other his CLOF with ambulation with RW and stair negotiation. Pt sister Diana stated, \"he is doing so much better with walking and the walker.\"   Assessment   Treatment Assessment Pt continues to improve with all aspects of mobility and todays session was focused on improving LE strengthening and endurance by completing repetitive task practice with functional activities. During curb negotiation, pt was able to complete many repetitions at Rin and was able to dec to CGA. On his last attempt he had a larger LOB requiring modA to recover which the patient stated he felt was due to fatigue. Continue per PT POC to improve B LE strength, endurance, balance and safety with functional activities prior to DC home. PT sessions to cont to focus on progressing (I) with NAZARIO and curb step.   Family/Caregiver Present Sister Diana and her significant other Travis   PT Barriers   Physical Impairment Decreased strength;Decreased range of motion;Decreased endurance;Impaired balance;Decreased mobility;Impaired vision   Functional Limitation Car transfers;Stair negotiation;Standing;Transfers;Walking   Plan   Treatment/Interventions Functional transfer training;LE strengthening/ROM;Elevations;Therapeutic exercise;Endurance training;Cognitive reorientation;Patient/family training;Equipment eval/education;Bed mobility;Gait training;Compensatory technique education   Progress Progressing toward goals   PT Therapy Minutes   PT Time In 1355   PT Time Out 1440   PT Total Time (minutes) 45   PT Mode of treatment - Individual (minutes) 45   PT Mode of treatment - Concurrent (minutes) 0   PT Mode of treatment - Group (minutes) 0   PT Mode of treatment - Co-treat (minutes) 0   PT Mode of Treatment - Total time(minutes) 45 minutes   PT Cumulative Minutes 1320   Therapy Time missed   Time missed? No     "

## 2025-04-30 NOTE — ASSESSMENT & PLAN NOTE
"- Reporting \"floater\" within the right eye with acute onset on 4/11/25 at mid-day while watching TV; describes a dark, \"spider\"-shaped floater that occupies a significant portion of the visual field, painless; has similar floaters in the left eye but smaller  - Ophthalmology consulted on [facility] admission by IM, per prior trauma surgery documentation, Dr. Chaudhry of ophthalmology was made personally aware of clinical situation on 4/11/25 prior to [facility] admission  > Ophthalmology consult 4/12: Exam c/w proliferative diabetic retinopathy with vitreous hemorrhage. Pt will follow up with ophtho immediately after discharge and for referral to a retina specialist  > 4/15- Pt thinks his R eye floater may be larger today, unable to clearly state or quantify symptoms. No other visual complaints of increased number of floaters, double/blurry vision, or bright flashes. Advised patient to inform us if symptoms change - will reach out to Ophthalmology accordingly  > 4/18- No changes noted in vision/floaters. His overall impaired vision continues to be a barrier for his ADLs/iADLs  > 4/20-24- Stable R eye floater    - Monitor for changes in vision  "

## 2025-04-30 NOTE — PROGRESS NOTES
"OT daily tx note     04/30/25 0830   Pain Assessment   Pain Assessment Tool 0-10   Pain Score 6   Pain Location/Orientation Location: Back   Hospital Pain Intervention(s) Emotional support;Rest   Restrictions/Precautions   Precautions Cognitive;Fall Risk;Fluid restriction;Pain;Spinal precautions   Weight Bearing Restrictions No   ROM Restrictions Yes  (spinal prec)   Braces or Orthoses LSO   Lifestyle   Autonomy \"can you please just give me a break for once\"   Eating   Type of Assistance Needed Independent   Physical Assistance Level No physical assistance   Comment seated in recliner   Eating CARE Score 6   Oral Hygiene   Type of Assistance Needed Independent   Physical Assistance Level No physical assistance   Oral Hygiene CARE Score 6   Shower/Bathe Self   Comment Pt refused. OT explained sig as to why they need to see him complete SB for insurance purposes but pt cont to decline depsite multiple attempts at beginning and end of session. OT went on to explain that it's been a few days since his last SB as he said he already got washed up recently. again explained repeatedly but pt refused even partial washing just to freshen up. pt reporting more discomofrt, pain, fatigue and anxious about PM meeting   Reason if not Attempted Refused to perform   Shower/Bathe Self CARE Score 7   Putting On/Taking Off Footwear   Comment TA for BLE ace wraps but refused to put on socks/shoes   Reason if not Attempted Refused to perform   Putting On/Taking Off Footwear CARE Score 7   Sit to Stand   Type of Assistance Needed Independent   Physical Assistance Level No physical assistance   Comment w/ RW   Sit to Stand CARE Score 6   Bed-Chair Transfer   Type of Assistance Needed Independent   Physical Assistance Level No physical assistance   Comment w/ RW   Chair/Bed-to-Chair Transfer CARE Score 6   Exercise Tools   Exercise Tools Yes   Theraband Pt engaged in UB strengthening using red theraband for 10 x 3 reps with Good tolerance. " "Pt completed bicep curls, horizontal abduction, and shoulder rows. Pt educated on energy conservation techniques and utilized rest breaks as needed. No pain reported but some soreness in R shoulder following exercises. Pt benefits from activity to improve UB strengthening, activity tolerance, and endurance to facilitate independence w/ ADL/IADLs.   Cognition   Overall Cognitive Status Impaired   Arousal/Participation Cooperative   Attention Attends with cues to redirect   Orientation Level Oriented X4   Memory Decreased recall of precautions   Following Commands Follows one step commands without difficulty   Additional Activities   Additional Activities Other (Comment)   Additional Activities Comments Upon arrival, pt asleep and remained fatigued t/o session. Pt c/o severe back pain, feet bleeding again last night, worsening R eye floater, and anxious about today's meeting w/ sister. Pt inquiring about PM meeting and OT explained it is regarding DC planning and who will be present. Pt reports that he \"feels he's going to be ganged up on in this meeting\" but OT reassuring it is for his benefit to prep for DC home. On top of this, pt reports he lost his phone after dialysis yesterday. OT inspected room and attempted to call phone which led straight to VM meaning it's dead/turned off. OT f/u w/ [facility] and they explained they called down to dialysis and went down to look for it w/o success. Will report to rest of team in hopes of locating phone before DC/PM meeting. Overall pt refusing to complete SB/dressing during session as he feels it is unnecessary and too much pain to tolerate \"nonsense.\" Despite explantation about insurance needing updated care scores t/o stay and other ways to prompt pt to complete, pt did not engage during session. Pt preferring to walk or exercise so brought down to gym to complete UB strengthening. Pt more fatigued evidenced by fall asleep mid convo and req more rest breaks than usual to walk " down to gym.   Assessment   Treatment Assessment Pt engaged in skilled OT session with focus on Functional Transfers, Standing tolerance, Standing balance , Gross motor strengthening , Energy conservation training/education, healthy coping education, Leisure and social pursuits, and community re-integration. Pt is limited by weakness, impaired balance, decreased endurance, increased fall risk, decreased ADLS, decreased IADLS, pain, decreased safety awareness, impaired judgement, decreased cognition, and visual deficits. See above for detailed session overview. OT services are warranted to address above barriers.   Prognosis Good   Problem List Decreased strength;Decreased endurance;Impaired balance;Decreased mobility;Orthopedic restrictions;Pain;Impaired vision   Barriers to Discharge None   Plan   Treatment/Interventions ADL retraining;Functional transfer training;Therapeutic exercise;Endurance training;Patient/family training   Progress Progressing toward goals   OT Therapy Minutes   OT Time In 0830   OT Time Out 1000   OT Total Time (minutes) 90   OT Mode of treatment - Individual (minutes) 90   OT Mode of treatment - Concurrent (minutes) 0   OT Mode of treatment - Group (minutes) 0   OT Mode of treatment - Co-treat (minutes) 0   OT Mode of Treatment - Total time(minutes) 90 minutes   OT Cumulative Minutes 1310   Therapy Time missed   Time missed? No

## 2025-04-30 NOTE — PROGRESS NOTES
"   04/30/25 1030   Pain Assessment   Pain Assessment Tool 0-10   Pain Score 5   Pain Location/Orientation Orientation: Lower;Location: Back   Pain Radiating Towards L and R   Pain Onset/Description Onset: Ongoing;Descriptor: Sore;Descriptor: Sharp   Effect of Pain on Daily Activities able to tolerate therapy, not limited due to pain   Patient's Stated Pain Goal No pain   Hospital Pain Intervention(s) Repositioned;Ambulation/increased activity;Rest  (pain improves with movement, edu pt that when he starts to feel sore/prior to starting to feel sore he can reposition, complete STS, seated therex to improve/prevent pain)   Restrictions/Precautions   Precautions Cognitive;Fall Risk;Fluid restriction;Spinal precautions;Pain   Weight Bearing Restrictions No   ROM Restrictions Yes  (spinal precautions)   Braces or Orthoses LSO   Cognition   Arousal/Participation Cooperative   Subjective   Subjective \"I lost my phone\". notifed team (RN/therapy managers to help locate phone). he reports he lost it yesterday afternoon. He said he had it in HD and doenst remember if it came back to the unit after HD or not. He did tell RN last night that he couldn't find his phone. RN called HD, cafeteria and security to help find it.   Roll Left and Right   Comment does not sleep in bed/does not roll in recliner   Sit to Lying   Type of Assistance Needed Independent   Sit to Lying CARE Score 6   Lying to Sitting on Side of Bed   Type of Assistance Needed Independent   Lying to Sitting on Side of Bed CARE Score 6   Sit to Stand   Type of Assistance Needed Independent;Adaptive equipment   Comment to RW   Sit to Stand CARE Score 6   Bed-Chair Transfer   Type of Assistance Needed Independent   Comment w RW   Chair/Bed-to-Chair Transfer CARE Score 6   Transfer Bed/Chair/Wheelchair   Limitations Noted In Vision;Balance;Confidence;LE Strength   Adaptive Equipment Roller Walker   Car Transfer   Comment pt delinced to practice   Walk 10 Feet   Type of " "Assistance Needed Independent;Adaptive equipment   Comment RW   Walk 10 Feet CARE Score 6   Walk 50 Feet with Two Turns   Type of Assistance Needed Independent;Adaptive equipment   Comment RW   Walk 50 Feet with Two Turns CARE Score 6   Walk 150 Feet   Type of Assistance Needed Independent;Adaptive equipment   Comment RW; no VC needed   Walk 150 Feet CARE Score 6   Ambulation   Primary Mode of Locomotion Prior to Admission Walk   Distance Walked (feet) 350 ft  (x4)   Assist Device Roller Walker   Gait Pattern Inconsistant Lisa;Slow Lisa;Decreased foot clearance;Forward Flexion;Wide GARO;Step through;Improper weight shift   Limitations Noted In Balance;Device Management;Endurance;Heel Strike;Speed;Strength   Walk Assist Level Modified Independent   Does the patient walk? 2. Yes   Curb or Single Stair   Style negotiated Single stair   Type of Assistance Needed Independent   Comment 6\"  steps, BUE support on BHR, non reciprocal pattern x8 steps   1 Step (Curb) CARE Score 6   4 Steps   Type of Assistance Needed Supervision   4 Steps CARE Score 4   Stairs   Type Stairs  ( steps)   # of Steps 8   Weight Bearing Precautions Fall Risk   Assist Devices Bilateral Rail   Findings offered to complete FF steps to practice 8 in a row to mimic home set up, pt declined   Therapeutic Interventions   Strengthening seated LAQ, marches, ankle pumps 3x10   Other offered for pt to practice utilizing a key to lock and unlock house due to vision deficits to work on standing balance and sequencing of task, pt declined practicing and stated he knows how to do this   Equipment Use   NuStep offered to do some endurance training on NuStep, pt delined due to R foot being externally rotated on the footplate and he said it's uncomfortable   Assessment   Treatment Assessment Pt participated in skilled PT session with emphasis on endurance training and LE strengthening by completing repetitive task practice. PT offered to pt many " things we could practice during session including car transfer, using a key to unlock/lock door, complete FF steps, use Nustep but pt declined. PT POC continues to work on pt strengthening and endurance training in realms that the patient is willing to do. Pt also mentioned he lost his phone, PT looked all around room with no luck in finding it. Continue per PT POC until DC date is determined and dialysis transportation is figured out. Family meeting with sister Diana and pt to take place this afternoon.   Problem List Decreased strength;Decreased endurance;Impaired balance;Decreased mobility;Orthopedic restrictions;Pain;Impaired vision   Barriers to Discharge None   PT Barriers   Physical Impairment Decreased strength;Decreased range of motion;Decreased endurance;Impaired balance;Decreased mobility;Impaired vision   Functional Limitation Car transfers;Stair negotiation;Standing;Transfers;Walking   Plan   Treatment/Interventions Functional transfer training   Progress Progressing toward goals   PT Therapy Minutes   PT Time In 1030   PT Time Out 1115   PT Total Time (minutes) 45   PT Mode of treatment - Individual (minutes) 45   PT Mode of treatment - Concurrent (minutes) 0   PT Mode of treatment - Group (minutes) 0   PT Mode of treatment - Co-treat (minutes) 0   PT Mode of Treatment - Total time(minutes) 45 minutes   PT Cumulative Minutes 1275   Therapy Time missed   Time missed? No

## 2025-04-30 NOTE — PROGRESS NOTES
Progress Note - PMR   Name: Naresh Carpio 64 y.o. male I MRN: [id number]  Unit/Bed#: -01 I Date of Admission: 4/11/2025   Date of Service: 4/30/2025 I Hospital Day: 19     Assessment & Plan  Traumatic retroperitoneal hematoma  - Sustained from mechanical fall several days prior to initial presentation on 4/2/25, presenting with progressively worsening low back pain. Trauma imaging revealed a large retroperitoneal hematoma with active extravasation and hemoglobin drop. Now s/p transfusion, Kcentra and ultimately IR embolization of the right deep circumflex iliac artery and superior gluteal artery with Dr. Garcia on 4/3/25. Unfortunately, on 4/6/25, his hemoglobin decreased and repeat CT showed a 5 mm inferior gluteal artery branch pseudoaneurysm and he is s/p IR pelvic angiogram on 4/7/25 with Dr. Hernandez, which demonstrated pseudoaneurysms at the distal branches of the left inferior gluteal artery which was embolized.   > 4/15: Hgb 7.2 today (from 7.6 yesterday). Back pain seems controlled. Will be receiving increased Epogen today per IM/Nephro  > Hgb 8.4 on 4/22 labs    - Continue to trend Hgb on CBC closely  - Consider repeat imaging if Hgb continues to downtrend or pain significantly worsens  - Contact IR if there are additional bleeding concerns given multiple prior interventions described above  - Local right hip/groin access site incision care  - Analgesia, see below  - PT and OT  Acute pain due to trauma  - Evaluated by APS on acute care  - Tylenol 975 mg q8h scheduled  - Gabapentin 100 mg three times weekly  - Methocarbamol 1000 mg q8h scheduled  - Lidocaine patches daily PRN  - Oxycodone 5 or 10 mg q4h PRN, wean as possible  Impaired mobility and activities of daily living  - Rehabilitation medicine physician for daily monitoring of care, 24 hour availability for acute medical issues, medication management, and therapeutic and diagnostic assessments.  - 24 hour rehabilitation nursing 7 days per week  for: management/teaching of medications, bowel/bladder routine, skin care.  - PT, OT for 2-3 hours per day, 5 to 6 days per week; 15 hours per week  - Rehabilitation Psychology as needed for adjustment and coping  - CM for barriers to discharge, community resources, and family support  - Discharge planning following to help ensure a safe and efficient discharge  - 900/7 program due to HD status, pain, expected therapy tolerance  4/18- Ongoing dispo planning with patient/sister and CM. Staff expressed that patient is progressing well from a functional standpoint but continues to have difficulties with ADLs/iADLs due to impaired vision. Will continue to work towards goals for safe dischare  4/22- DC date now pending outpatient HD setup    L2 vertebral fracture (HCC)  > Sustained from a mechanical fall on 3/24/25, found to have an acute, obliquely oriented fracture of the L2 vertebral body with distraction of the dominant fracture fragment and involvement of the anterior and posterior cortices and superior endplate  > At that time, managed non-operatively with LSO bracing, though it does not appear orthopedic spine surgery or neurosurgery evaluated at the time of injury    - Maintain LSO brace with lumbar spinal precautions   - Should follow-up with spine surgery outpatient  Closed fracture of proximal end of left humerus with routine healing  - Sustained from fall in early February, per CT of the LUE on 2/9/25, there was a minimally displaced fracture of the anterior facet of the greater tuberosity of the left humerus, minimally apparent on repeat x-ray of the shoulder on 3/24/25  - He was evaluated by orthopedic surgery at the time of the initial injury and was recommended to maintain NWB through the LUE with a sling  - There is no further orthopedic evaluation documentation and it does not appear that further follow-up occurred.  - Clinically, he is not describing ongoing significant left arm or shoulder pain and  "has been weight-bearing through the LUE  > 4/14: Repeat humerus X-ray done yesterday shows no change in fracture alignment. Ortho consulted.  >4/15: Ortho c/s: May WBAT to LUE. PT/OT to increase strength and ROM. F/u with Dr. Cisneros in 4 weeks    - Monitor  - PT/OT  Insomnia  Patient reports he has an overall a poor sleep hygiene: he sleeps at 1-2am nightly and watches TV up until bed.  Advised patient to attempt to regulate sleep/wake cycle while here to better participate in therapy. Attempt to try to sleep earlier around midnight  >4/15: reviewed sleep log: Pt went to sleep around midnight and slept for 6 hours.  > 4/17- Poor sleep last night: was awake every other hour. Requiring nap during the day. Can increase melatonin tomorrow if this persists  Pt endorses moving from bed to chair multiple times throughout the night which is his baseline at home too    - Continue sleep log  - Continue melatonin 6mg nightly  At risk for constipation   4/15- Adjusted bowel medications: Colace 100mg BID, Senna 17.2mg with lunch and PRN Miralax/Suppos  4/17- No BM since 4/13. Lactulose this evening if no BM after lunch. Can consider Relistor if no success for opioid-inducted constipation  4/18- small hard BMs x2 yesterday after lactulose and suppository. Continue bowel regimen  >BM 4/22 but required Relistor and suppository/lactulose  > Last BM 4/29    - Will follow BMs and adjust bowel regimen to target soft, formed stools q1-2 days, per pt's regular schedule.  Anemia due to chronic kidney disease, on chronic dialysis (HCC)  > 4/14: Hgb 7.6  > 4/15: Hgb 7.2 - Discussed with IM who is coordinating with Nephrology; they will increase his Epoetin jessica  > 4/17: Hgb 7.7  > 4/29: Hgb 8.0    - Trend Hgb on CBC  - Epoetin jessica with dialysis  - Transfusions per IM, appreciate their recommendations  Visual disturbance  - Reporting \"floater\" within the right eye with acute onset on 4/11/25 at mid-day while watching TV; describes a " "dark, \"spider\"-shaped floater that occupies a significant portion of the visual field, painless; has similar floaters in the left eye but smaller  - Ophthalmology consulted on [facility] admission by IM, per prior trauma surgery documentation, Dr. Chaudhry of ophthalmology was made personally aware of clinical situation on 4/11/25 prior to [facility] admission  > Ophthalmology consult 4/12: Exam c/w proliferative diabetic retinopathy with vitreous hemorrhage. Pt will follow up with ophtho immediately after discharge and for referral to a retina specialist  > 4/15- Pt thinks his R eye floater may be larger today, unable to clearly state or quantify symptoms. No other visual complaints of increased number of floaters, double/blurry vision, or bright flashes. Advised patient to inform us if symptoms change - will reach out to Ophthalmology accordingly  > 4/18- No changes noted in vision/floaters. His overall impaired vision continues to be a barrier for his ADLs/iADLs  > 4/20-24- Stable R eye floater    - Monitor for changes in vision  Diabetic ulcer of left midfoot associated with type 2 diabetes mellitus, limited to breakdown of skin (HCC)  - Has chronic wounds affecting the left foot plantar surface, left foot 1st and 2nd digits  - Left Plantar Foot Wounds and Left toes:  Cleanse with NSS and pat dry. Paint all wounds with Betadine daily and cover plantar open wound with silicone bordered foam dressing. Naren with T for Treatment and change every other day or PRN   > 4/22 : Wound care recs: Paint left foot 1st and 2nd digits with betadine dailyand covering plantar open wound with foam. Podiatry consulted   > 4/22: Podiatry did bedside debridement of left foot plantar ulcer. Continue with local wound care, no surgical intervention needed. SALVATORE ordered and vascular surgery consulted. Pt to maintain frequent LLE elevation, strict pressure and fracture reduction. WBAT to LLE  > 4/24: Pictures of LLE reviewed on chart. Wounds are stable " with no signs of infection. XR L foot 4/22 with no evidence of OM. ABIs and toe pressures are within healing range. Pt to continue to follow up with wound care for further management  PAD (peripheral artery disease) (HCC)  > Non invasive arterial studies from 3/28:  Diffuse disease noted throughout the femoral-popliteal arteries. Evidence of 50-75% stenosis in the proximal popliteal artery. Evidence of 50-75% stenosis at the proximal tibio-peroneal trunk.   LLE: Diffuse disease noted throughout the femoral-popliteal arteries. Evidence of >75% stenosis in the proximal popliteal artery.  Tech Note: Patient refused SALVATORE.  SALVATORE 4/22: Left lower limb SALVATORE noncompressible, metatarsal pressure 155 mmHg, great toe pressure 72 mmHg. Right lower limb noncompressible SALVATORE, metatarsal pressure 158 mmHg, second toe pressure 101 mmHg   Vascular surgery consult pending  Hyponatremia  > 4/14: Na 129 - A&O x3, pt mentating appropriately  > 4/15: Na 125 - Discussed with IM who is coordinating with Nephrology; they will manage Na during HD   > Sodium levels continue to fluctuate from 125-131. Pt remains A&O x3. Nephrology will continue to manage electrolyte deficiencies    - Trend on BMP  - Electrolyte management per nephrology  - Continue HD in setting of ESRD  ESRD (end stage renal disease) (HCC)  - Continue HD  - Nephrology consulted and following, appreciate their recommendations  >4/22: new outpatient HD center needed close to his home after discharge. CM to follow up. DC pushed  Paroxysmal atrial fibrillation (HCC)  - Currently rate controlled  - Embolic stroke risk reduction with apixaban 5 mg BID    >4/22- Refusing it this morning. Extensive conversation had with patient to discuss the importance of compliance given his high stroke risk.   > 4/24- now compliant with Eliquis  Chronic diastolic (congestive) heart failure (HCC)  Wt Readings from Last 3 Encounters:   04/30/25 94 kg (207 lb 3.2 oz)   04/11/25 98.5 kg (217 lb 2.5 oz)    03/24/25 107 kg (234 lb 12.6 oz)     - Most recent TTE from 10/9/24 with EF 60-65%, grade 1 diastolic dysfunction  - Volume management with HD per nephrology  - Monitor weights  - Appreciate medicine and nephrology recommendations and management  Primary hypertension  - Nifedipine  - Appreciate IM and nephrology management  Gout  - Allopurinol for flare prophylaxis  Hyperphosphatemia  > 4/15: Phos 4.6  - Management per nephrology  - Continue phosphate binders  Wound of skin  - Has right anterior pre-tibial scab, wash gently daily, leave open to air  - Wound care RN consulted and following  At high risk for skin breakdown  > 4/21- New sacral pressure injury noted by staff. Pictures reviewed on chart. Patient primarily sleeps in recliner (both here and at home). Discussed with therapy and pt will use a cushion that offers sacral relief on recliner. Will continue to monitor  >4/22- Wound care recs:  Cleanse wound with NSS, pat dry. Apply calcium alginate with silver to wound bed and cover with foam. Change every other day and prn. Cushion will not be covered per therapy for home - advised patient to find one off [employer] to continue offloading     - Moisturize skin daily with skin nourishing cream  - Ehob cushion in chair when out of bed.  - Preventative hydraguard to bilateral heels BID and PRN.   - Calazime paste to sacrum/buttocks BID and PRN  - Monitor clinically for breakdown, frequent turns  - Wound care RN consulted and following  At risk for venous thromboembolism (VTE)  - Apixaban, see above  Secondary hyperparathyroidism (HCC)      Subjective   Patient is a 64-year-old male with history of chronic anemia, atrial fibrillation, diabetes, end-stage renal disease on hemodialysis presenting on 4/2 with progressively worsening back pain after a fall found to have left-sided right show peritoneal hematoma with active extravasation status post Kcentra stabilization with transfusions and multiple IR embolizations.  Course complicated by acute on chronic pain     Chief Complaint: f/u ambulatory dysfunction    Interval: Patient seen and evaluated in room and during therapy session.  Family meeting occurred today in which we all provided updates from a therapy, nursing, physician and case management standpoint.  Some of the barriers that were discussed included his dialysis set up as well as transportation to and from.  Additionally we spoke about his overall functional improvements and observations during therapy session to occur after the conclusion of the meeting.  Otherwise the patient is doing well and participating in therapy with some encouragement.  He had dialysis yesterday but disconnected 15 minutes early due to ongoing back pain.  He is acute on chronic back pain that flares up at times which is usually self-limited.  No new labs to review today.  He is eating approximately 50% of his meals today however does eat 100% at times.  Last bowel movement on 4/29.  He is not have any fever, chills, nausea vomiting, cough, shortness of breath, diarrhea or constipation at this time    Objective :  Temp:  [97.5 °F (36.4 °C)-98.1 °F (36.7 °C)] 98.1 °F (36.7 °C)  HR:  [66-97] 87  BP: (100-148)/(56-90) 148/73  Resp:  [16-18] 18  SpO2:  [97 %-98 %] 98 %  O2 Device: None (Room air)    Functional Update:  Physical Therapy Occupational Therapy Speech Therapy   Weight Bearing Status: Full Weight Bearing  Transfers: Supervision  Bed Mobility: Supervision  Amulation Distance (ft): 200 feet (to 350 feet)  Ambulation: Supervision (DS)  Assistive Device for Ambulation: Roller Walker  Number of Stairs: 8  Assistive Device for Stairs: Bilateral Hand Rails  Stair Assistance: Supervision  Discharge Recommendations: Home with:  DC Home with:: Family Support, First Floor Setup, Home Physical Therapy   Eating: Independent  Grooming: Independent  Bathing:  (setup)  Bathing:  (setup)  Upper Body Dressing: Independent  Lower Body Dressing:  Supervision  Toileting: Independent  Tub/Shower Transfer: Independent  Toilet Transfer: Independent  Cognition: Exceptions to WNL  Cognition: Decreased Memory, Decreased Executive Functions, Decreased Attention, Decreased Safety, Behavioral Considerations  Orientation: Place, Person, Time, Situation                 Physical Exam  Vitals reviewed.   Constitutional:       General: He is not in acute distress.  HENT:      Head: Normocephalic and atraumatic.      Right Ear: External ear normal.      Left Ear: External ear normal.      Nose: Nose normal. No rhinorrhea.      Mouth/Throat:      Mouth: Mucous membranes are moist.      Pharynx: Oropharynx is clear.   Eyes:      General: No scleral icterus.  Cardiovascular:      Rate and Rhythm: Normal rate.      Pulses: Normal pulses.   Pulmonary:      Effort: Pulmonary effort is normal. No respiratory distress.   Abdominal:      General: There is no distension.      Palpations: Abdomen is soft.   Musculoskeletal:      Right lower leg: No edema.      Left lower leg: No edema.   Skin:     General: Skin is warm and dry.   Neurological:      Mental Status: He is alert and oriented to person, place, and time.      Sensory: Sensory deficit present.   Psychiatric:         Mood and Affect: Mood normal.         Behavior: Behavior normal.           Scheduled Meds:  Current Facility-Administered Medications   Medication Dose Route Frequency Provider Last Rate    acetaminophen  975 mg Oral Q8H [facility] Damian Cerda MD      allopurinol  100 mg Oral Daily Damian Cerda MD      aluminum-magnesium hydroxide-simethicone  30 mL Oral Q4H PRN GRANT Church      apixaban  5 mg Oral BID Damian Cerda MD      atorvastatin  80 mg Oral Daily With Dinner Damian Cerda MD      bisacodyl  10 mg Rectal Daily PRN Michelle Landa,       calcium carbonate  500 mg Oral Daily PRN GRANT Church      docusate sodium  100 mg Oral BID Michelle LAMONTE Landa, DO      epoetin jessica  8,000 Units Intravenous After  Dialysis German Linton MD      gabapentin  100 mg Oral Once per day on Monday Wednesday Friday Damian Cerda MD      lidocaine  1 patch Topical Daily PRN Damian Cerda MD      melatonin  6 mg Oral HS Jayden Rowland MD      methocarbamol  1,000 mg Oral Q8H [facility] Jayden Rowland MD      naloxone  0.04 mg Intravenous Q1MIN PRN Damian Cerda MD      NIFEdipine  30 mg Oral After Dinner Damian Cerda MD      ondansetron  4 mg Oral Q6H PRN Gino Austin PA-C      oxyCODONE  5 mg Oral Q4H PRN Damian Cerda MD      Or    oxyCODONE  10 mg Oral Q4H PRN Damian Cerda MD      oxyCODONE  2.5 mg Oral Q8H PRN Jayden Rowland MD      polyethylene glycol  17 g Oral Daily PRN Michelle Ngoel, DO      senna  2 tablet Oral Daily With Lunch Michelle T Landa, DO      sevelamer  1,600 mg Oral TID With Meals Damian Cerda MD      white petrolatum-mineral oil   Topical TID PRN Michelle Landa, DO           Lab Results: I have reviewed the following results:  Results from last 7 days   Lab Units 04/29/25  1350   HEMOGLOBIN g/dL 8.0*   HEMATOCRIT % 26.3*   WBC Thousand/uL 7.55   PLATELETS Thousands/uL 224     Results from last 7 days   Lab Units 04/29/25  1350   BUN mg/dL 60*   SODIUM mmol/L 132*   POTASSIUM mmol/L 5.5*   CHLORIDE mmol/L 96   CREATININE mg/dL 8.38*                Santos Guan DO  Physical Medicine and Rehabilitation  [facility]    I have spent a total time of 55 minutes in caring for this patient on the day of the visit/encounter including Counseling / Coordination of care, Documenting in the medical record, and Communicating with other healthcare professionals .  Additional time spent during family meeting with sister and brother-in-law discussing his current medical and functional status and disposition planning as well as answering questions.

## 2025-04-30 NOTE — PROGRESS NOTES
Progress Note - Nephrology   Name: Naresh Carpio 64 y.o. male I MRN: [id number]  Unit/Bed#: -01 I Date of Admission: 4/11/2025   Date of Service: 4/30/2025 I Hospital Day: 19     Assessment & Plan  ESRD (end stage renal disease) ([facility])  #ESRD on HD TTS:  Dialysis unit/days: [facility]  Access: [facility]  Underwent hemodialysis yesterday and stayed on treatment longer though treatment was cut 16 minutes short due to back pain.  Educated on compliance with full treatment.  1.8 L was removed on dialysis post weight 93.7 kg.  Plan for next dialysis tomorrow May 1st  Yesterday's blood work showed a sodium of 132 and a potassium of 5.5, these were pre-hemodialysis labs.  Primary hypertension  Volume mediated  Blood pressure and volume status improving with ultrafiltration on dialysis.  Low-sodium diet   Nifedipine 30 mg daily   Continue ultrafiltration with dialysis.  Anemia due to chronic kidney disease, on chronic dialysis ([facility])  Continue SURINDER  Hemoglobin stable at 8    Traumatic retroperitoneal hematoma  S/p IR embolization  Hemoglobin stable  Secondary hyperparathyroidism ([facility])  Sevelamer with meals 3 times daily   Continue renal diet  Continue current management    PAD (peripheral artery disease) ([facility])      I have reviewed the nephrology recommendations including assessment and plan, with patient, and we are in agreement with renal plan including the information outlined above. Ok for discharge from Nephrology service perspective.  Will continue to follow while in the inpatient rehabilitation unit.    Subjective   Brief History of Admission - 64-year-old male with a history of end-stage renal disease currently in the rehabilitation unit after being treated for traumatic retroperitoneal hemorrhage.     Having some back pain at this time.  No chest pain or shortness of breath underwent hemodialysis yesterday.    Objective :  Temp:  [97.5 °F (36.4 °C)-98.1 °F (36.7 °C)] 98.1 °F (36.7 °C)  HR:  [62-97] 87  BP:  (100-148)/(56-90) 148/73  Resp:  [16-18] 18  SpO2:  [97 %-98 %] 98 %  O2 Device: None (Room air)    Current Weight: Weight - Scale: 94 kg (207 lb 3.2 oz)  First Weight: Weight - Scale: 98.9 kg (218 lb)  I/O         04/28 0701  04/29 0700 04/29 0701  04/30 0700 04/30 0701  05/01 0700    P.O. 657 120 240    I.V. (mL/kg)  500 (5.3)     Total Intake(mL/kg) 657 (6.9) 620 (6.6) 240 (2.6)    Urine (mL/kg/hr) 100 (0) 0 (0)     Other  2312     Stool  0     Total Output 100 2312     Net +557 -1692 +240           Unmeasured Urine Occurrence 1 x 1 x     Unmeasured Stool Occurrence  1 x           Physical Exam  Vitals and nursing note reviewed. Exam conducted with a chaperone present.   Constitutional:       General: He is not in acute distress.     Appearance: He is well-developed. He is not diaphoretic.   HENT:      Head: Normocephalic and atraumatic.   Eyes:      General: No scleral icterus.     Pupils: Pupils are equal, round, and reactive to light.   Cardiovascular:      Rate and Rhythm: Normal rate and regular rhythm.      Heart sounds: Normal heart sounds. No murmur heard.     No friction rub. No gallop.   Pulmonary:      Effort: Pulmonary effort is normal. No respiratory distress.      Breath sounds: Normal breath sounds. No wheezing or rales.   Chest:      Chest wall: No tenderness.   Abdominal:      General: Bowel sounds are normal. There is no distension.      Palpations: Abdomen is soft.      Tenderness: There is no abdominal tenderness. There is no rebound.   Musculoskeletal:         General: Normal range of motion.      Cervical back: Normal range of motion and neck supple.   Skin:     Findings: No rash.   Neurological:      Mental Status: He is alert and oriented to person, place, and time.         Medications:    Current Facility-Administered Medications:     acetaminophen (TYLENOL) tablet 975 mg, 975 mg, Oral, Q8H [facility], 975 mg at 04/30/25 0611    allopurinol (ZYLOPRIM) tablet 100 mg, 100 mg,  Oral, Daily, Damian Cerda MD, 100 mg at 04/30/25 0758    aluminum-magnesium hydroxide-simethicone (MAALOX) oral suspension 30 mL, 30 mL, Oral, Q4H PRN, GRANT Church, 30 mL at 04/28/25 2121    apixaban (ELIQUIS) tablet 5 mg, 5 mg, Oral, BID, Damian Cerda MD, 5 mg at 04/27/25 1700    atorvastatin (LIPITOR) tablet 80 mg, 80 mg, Oral, Daily With Dinner, Damian Cerda MD, 80 mg at 04/29/25 1807    bisacodyl (DULCOLAX) rectal suppository 10 mg, 10 mg, Rectal, Daily PRN, Michelle Landa DO, 10 mg at 04/18/25 0000    calcium carbonate (TUMS) chewable tablet 500 mg, 500 mg, Oral, Daily PRN, GRANT Church, 500 mg at 04/28/25 1155    docusate sodium (COLACE) capsule 100 mg, 100 mg, Oral, BID, Michelle ABURTO Landa, DO, 100 mg at 04/30/25 0758    epoetin jessica (EPOGEN,PROCRIT) injection 8,000 Units, 8,000 Units, Intravenous, After Dialysis, German Linton MD, 8,000 Units at 04/29/25 1612    gabapentin (NEURONTIN) capsule 100 mg, 100 mg, Oral, Once per day on Monday Wednesday Friday, Damian Cerda MD, 100 mg at 04/30/25 0757    lidocaine (LIDODERM) 5 % patch 1 patch, 1 patch, Topical, Daily PRN, Damian Cerda MD, 1 patch at 04/18/25 2135    melatonin tablet 6 mg, 6 mg, Oral, HS, Jayden Rowland MD, 6 mg at 04/29/25 2300    methocarbamol (ROBAXIN) tablet 1,000 mg, 1,000 mg, Oral, Q8H KEMAL, Jayden Rowland MD, 1,000 mg at 04/30/25 0610    naloxone (NARCAN) 0.04 mg/mL syringe 0.04 mg, 0.04 mg, Intravenous, Q1MIN PRN, Damian Cerda MD    NIFEdipine (PROCARDIA XL) 24 hr tablet 30 mg, 30 mg, Oral, After Dinner, Damian Cerda MD, 30 mg at 04/29/25 1807    ondansetron (ZOFRAN-ODT) dispersible tablet 4 mg, 4 mg, Oral, Q6H PRN, Gino Austin PA-C, 4 mg at 04/20/25 0023    oxyCODONE (ROXICODONE) IR tablet 5 mg, 5 mg, Oral, Q4H PRN, 5 mg at 04/29/25 0558 **OR** oxyCODONE (ROXICODONE) immediate release tablet 10 mg, 10 mg, Oral, Q4H PRN, Damian Cerda MD, 10 mg at 04/30/25 0125    oxyCODONE (ROXICODONE) split tablet  "2.5 mg, 2.5 mg, Oral, Q8H PRN, Jayden Rowland MD, 2.5 mg at 04/29/25 0749    polyethylene glycol (MIRALAX) packet 17 g, 17 g, Oral, Daily PRN, Michelleaura Ngoel, DO, 17 g at 04/26/25 1259    senna (SENOKOT) tablet 17.2 mg, 2 tablet, Oral, Daily With Lunch, Michelle Ngoel, DO, 17.2 mg at 04/29/25 1201    sevelamer (RENAGEL) tablet 1,600 mg, 1,600 mg, Oral, TID With Meals, Damian Cerda MD, 1,600 mg at 04/30/25 0759    white petrolatum-mineral oil (EUCERIN,HYDROCERIN) cream, , Topical, TID PRN, Michelle Ngoel, DO      Lab Results: I have reviewed the following results:  Results from last 7 days   Lab Units 04/29/25  1350   WBC Thousand/uL 7.55   HEMOGLOBIN g/dL 8.0*   HEMATOCRIT % 26.3*   PLATELETS Thousands/uL 224   POTASSIUM mmol/L 5.5*   CHLORIDE mmol/L 96   CO2 mmol/L 23   BUN mg/dL 60*   CREATININE mg/dL 8.38*   CALCIUM mg/dL 9.2       Administrative Statements   I have spent a total time of 15 minutes in caring for this patient on the day of the visit/encounter including Impressions, Counseling / Coordination of care, Documenting in the medical record, Reviewing/placing orders in the medical record (including tests, medications, and/or procedures), and Obtaining or reviewing history  .  Portions of the record may have been created with voice recognition software. Occasional wrong word or \"sound a like\" substitutions may have occurred due to the inherent limitations of voice recognition software. Read the chart carefully and recognize, using context, where substitutions have occurred.If you have any questions, please contact the dictating provider.  "

## 2025-04-30 NOTE — PROGRESS NOTES
Night shift nurse reported that pt stated that he is missing his phone. Room was searched thoroughly by multiple people. I went down to dialysis unit and searched for it with another nurse. Dietary called, security called, no reports of a phone. Pt stated that he had it at dialysis last.

## 2025-04-30 NOTE — CASE MANAGEMENT
Cm phoned rigo at [facility]. She verified they are not able to assist pt with transportation as he resides in [facility].  She referred CM to [facility]. Cm s/w Tania at this location and she confirmed they are not able to provide transport into [facility].  She referred cm to phone [facility] transportation company at [phone] to investigate additional options.  Cm was also provided [facility] website as an option for transportation.     Cm phoned admissions at [facility] and learned [facility] in [facility] but they do not have any chair openings.

## 2025-04-30 NOTE — PROGRESS NOTES
"Progress Note - Hospitalist   Name: Naresh Carpio 64 y.o. male I MRN: [id number]  Unit/Bed#: -01 I Date of Admission: 4/11/2025   Date of Service: 4/30/2025 I Hospital Day: 19    Assessment & Plan  Traumatic retroperitoneal hematoma  Admitted to the trauma service after a fall with L2 vertebral body and retroperitoneal hematoma.  S/p IR embolization of distal right deep circumflex iliac artery/distal right superior gluteal artery both of which supplied an area of active extravasation and then pseudoaneurysms present at distal branches of the left inferior gluteal artery. The artery was embolized using coils and gelfoam.   He was type and screened 4/17/25 in case needed blood during HD on that day.  Hgb 4/22 improved to 8.4 and 4/29 was 8.0  Anemia due to chronic kidney disease, on chronic dialysis ([facility])  Had 5 U PRBCs during hospital stay  Hemoglobin has been running mostly 7.3-7.4.    On 4/14/25 was 7.6; on 4/15/24 was 7.2 ---> Renal increased his Epogen in light of his hemoglobin  CBC Tuesday in HD 4/29/25 = 8.0 and stable  ESRD (end stage renal disease) ([facility])  Continue Sevelamer TID with meals  Renal following  HD T-TH-Sat while here in rehab  Paroxysmal atrial fibrillation ([facility])  On no rate control medications  Examines RRR  Anticoagulation with Eliquis as at home but he doesn't want to take it because he is worried it will make his right eye vision worse.  I d/w on call Optho Dr Diaz = \"The risk is not quantified in the literature. Dr edmond's note suggests a diagnosis of proliferative diabetic retinopathy and bilateral and recurrent vitreous hemorrhage. So he is at risk for - and has had hemorrhage - whether or not he is on eliquis (assuming dr edmond is correct).     I would always recommend life and death are more important so to do what you recommend re his systemic condition, and then to follow up with dr edmond as he recommended so a real exam can be done in the clinic\".   Explained all this " "to him and he understands  He had been taking the Eliquis again and now refusing it because he is afraid of his foot bleeding.  He says \"no point in discussing it\"; I d/w him but again refuses to take it  Chronic diastolic (congestive) heart failure (HCC)  Some LE edema = chronic/stable  Renal diet  stable  Volume management via HD  Primary hypertension  Continue Procardia XL 30 mg qd  Tx per renal  Stable; no changes today 4/30/25  Visual disturbance  Reported a right eye \"floater\" without associated pain earlier in his hospitalization which has resolved but continued to have blurry central vision.  Ophthalmology saw here in [facility] = visual acuity without correction is 20/400 OU. Dx: Proliferative diabetic retinopathy with vitreous hemorrhage OU as well as cataracts.   Will need outpatient follow up immediately after discharge from rehab  Diabetic ulcer of left midfoot associated with type 2 diabetes mellitus, limited to breakdown of skin (HCC)  Continue local care  Being followed by WC  Last LEADS was 3/28/25 = 50-75% right prox polital and prox tibioperoneal trunk; >75% stenosis in prox popliteal artery on left  Was seen by VS in 12/2024 hospital stay and he declined any intervention at the time  Podiatry saw here on 4/22/25, s/p debridement = no surgical intervention needed at this time  Had new bleeding from foot wound 4/27/25. The bleeding then resolved. Podiatry was made aware.  Hyponatremia  Renal following  Continue fluid restriction 1200 cc per 24 hours  Closed fracture of proximal end of left humerus with routine healing  Occurred from a fall in February and eval'd at the time of the injury  Was to continue NWB/sling and followup with Ortho but there was no followup done  Xray 4/13/25 = \"Unchanged alignment of the chronic greater tuberosity avulsion fracture\".   Ortho saw here on 4/14 = WBAT; see Dr Cisneros 4 weeks @ 5/14/25  Gout  Continue Allopurinol  L2 vertebral fracture (HCC)  CT CAP 4/2: Fracture of " L2 vertebral body extending to the superior endplate  Continue LSO brace  Insomnia  Patient reports not sleeping well at night because he has trouble getting comfortable. He has been trying to sleep in the recliner   PAD (peripheral artery disease) (HCC)  Vascular surgery saw per Podiatry's recommendation = they will see in the office.  They advised start ASA but after the d/w the PA in relation to his vitreous hemorrhages, can hold off on that and d/w him in the office.  This will give him time to see the Retina specialist as OP as about getting tx    The above assessment and plan was reviewed and updated as determined by my evaluation of the patient on 4/30/2025.    History of Present Illness   Patient seen and examined. Patients overnight issues or events were reviewed with nursing staff. New or overnight issues include the following:   No new or overnight issues.  Offers no complaints    Review of Systems   All other systems reviewed and are negative.      Objective :  Temp:  [97.5 °F (36.4 °C)-98.1 °F (36.7 °C)] 98.1 °F (36.7 °C)  HR:  [62-97] 87  BP: (100-148)/(56-90) 148/73  Resp:  [16-18] 18  SpO2:  [97 %-98 %] 98 %  O2 Device: None (Room air)    Invasive Devices       Hemodialysis Catheter  Duration             HD Permanent Double Catheter 609 days                    Physical Exam  General Appearance: no distress, non toxic appearing  HEENT: PERRLA, conjuctiva normal; oropharynx clear; mucous membranes moist   Neck:  Supple, normal ROM  Lungs: CTA, normal respiratory effort, no retractions, expiratory effort normal  CV: regular rate and rhythm; no rubs/murmurs/gallops, PMI normal   ABD: soft; ND/NT; +BS  EXT: + tight LE edema but is a little softer today since wearing ACE wraps  Skin: normal turgor, normal texture  Psych: affect normal, mood normal  Neuro: AAO        The above physical exam was reviewed and updated as determined by my evaluation of the patient on 4/30/2025.      Lab Results: I have reviewed  the following results:  Results from last 7 days   Lab Units 04/29/25  1350   WBC Thousand/uL 7.55   HEMOGLOBIN g/dL 8.0*   HEMATOCRIT % 26.3*   PLATELETS Thousands/uL 224     Results from last 7 days   Lab Units 04/29/25  1350   SODIUM mmol/L 132*   POTASSIUM mmol/L 5.5*   CHLORIDE mmol/L 96   CO2 mmol/L 23   BUN mg/dL 60*   CREATININE mg/dL 8.38*   CALCIUM mg/dL 9.2                   Imaging Results Review: No pertinent imaging studies reviewed.  Other Study Results Review: No additional pertinent studies reviewed.    Review of Scheduled Meds: Medications  reviewed and reconciled as needed  Current Facility-Administered Medications   Medication Dose Route Frequency Provider Last Rate    acetaminophen  975 mg Oral Q8H [facility] Damian Cerda MD      allopurinol  100 mg Oral Daily Damian Cerda MD      aluminum-magnesium hydroxide-simethicone  30 mL Oral Q4H PRN GRANT Church      apixaban  5 mg Oral BID Damian Cerda MD      atorvastatin  80 mg Oral Daily With Dinner Damian Cerda MD      bisacodyl  10 mg Rectal Daily PRN Michelle T Landa, DO      calcium carbonate  500 mg Oral Daily PRN GRANT Church      docusate sodium  100 mg Oral BID Michelle T Landa, DO      epoetin jessica  8,000 Units Intravenous After Dialysis German Linton MD      gabapentin  100 mg Oral Once per day on Monday Wednesday Friday Damian Cerda MD      lidocaine  1 patch Topical Daily PRN Damian Cerda MD      melatonin  6 mg Oral HS Jayden Rowland MD      methocarbamol  1,000 mg Oral Q8H [facility] Jayden Rowland MD      naloxone  0.04 mg Intravenous Q1MIN PRN Damian Cerda MD      NIFEdipine  30 mg Oral After Dinner Damian Cerda MD      ondansetron  4 mg Oral Q6H PRN Gino Austin PA-C      oxyCODONE  5 mg Oral Q4H PRN Damian Cerda MD      Or    oxyCODONE  10 mg Oral Q4H PRN Damian Cerda MD      oxyCODONE  2.5 mg Oral Q8H PRN Jayden Rowland MD      polyethylene glycol  17 g Oral Daily PRN Michelle T Landa, DO      senna  2  tablet Oral Daily With Lunch Michelle Landa DO      sevelamer  1,600 mg Oral TID With Meals Damian Cerda MD      white petrolatum-mineral oil   Topical TID PRN Michelle Landa DO         VTE Pharmacologic Prophylaxis: Eliquis  Code Status: Level 1 - Full Code  Current Length of Stay: 19 day(s)    Administrative Statements     ** Please Note:  voice to text software may have been used in the creation of this document. Although proof errors in transcription or interpretation are a potential of such software**

## 2025-04-30 NOTE — ASSESSMENT & PLAN NOTE
Wt Readings from Last 3 Encounters:   04/30/25 94 kg (207 lb 3.2 oz)   04/11/25 98.5 kg (217 lb 2.5 oz)   03/24/25 107 kg (234 lb 12.6 oz)     - Most recent TTE from 10/9/24 with EF 60-65%, grade 1 diastolic dysfunction  - Volume management with HD per nephrology  - Monitor weights  - Appreciate medicine and nephrology recommendations and management

## 2025-04-30 NOTE — ASSESSMENT & PLAN NOTE
Had 5 U PRBCs during hospital stay  Hemoglobin has been running mostly 7.3-7.4.    On 4/14/25 was 7.6; on 4/15/24 was 7.2 ---> Renal increased his Epogen in light of his hemoglobin  CBC Tuesday in HD 4/29/25 = 8.0 and stable

## 2025-04-30 NOTE — PROGRESS NOTES
Call placed to [employer] to ask if they can contact the linen company to check for pt's phone, [employer] stated no, they don't open the bags, a machine does.

## 2025-04-30 NOTE — ASSESSMENT & PLAN NOTE
Admitted to the trauma service after a fall with L2 vertebral body and retroperitoneal hematoma.  S/p IR embolization of distal right deep circumflex iliac artery/distal right superior gluteal artery both of which supplied an area of active extravasation and then pseudoaneurysms present at distal branches of the left inferior gluteal artery. The artery was embolized using coils and gelfoam.   He was type and screened 4/17/25 in case needed blood during HD on that day.  Hgb 4/22 improved to 8.4 and 4/29 was 8.0

## 2025-04-30 NOTE — OCCUPATIONAL THERAPY NOTE
Occupational Therapy Note    Family mtg occurred w/ pt, his sister, the sisters significant other, this writer (OT), PT Shawna, CM Patti and Dr Guan. Discussion was had re: pt and families concerns w/ discharging home believing he would benefit from additional time to allow him to live independently. The significant other expressed his concern that the sister is going to experience cg burden due to the sister being the person that the pt primarily calls when he is home. Both PT and OT spent time addressing these concerns explaining that the pt is grossly IND w/ use of RW, LHAE and a walker tray which we've been practicing with during his sessions. OT edu that the only true assistance the pt will need is with filling a pillbox 1x/wk and going grocery shopping which the sister is agreeable to. The significant other persisted with his concerns stating the walker is unsafe and he needs to use the SPC. PT edu re: his performance with the RW including when navigating the stairs. Pt/family was re-edu that pt will have cont therapy and nsg services at home that will be able to have eyes on him on his non-dialysis days. CM edu that based on his performance, he is set for DC and the only barrier to DC is solidifying transportation <> dialysis. See CM notes for additional details. OT strongly encouraged pt to continue to participate daily with therapy to allow for him to increase his overall confidence with his ability to perform ADL/IADLS at home. OT informed the family and team that the pt has been refusing to complete bathing and LB dressing so if the pt is concerned with his ability to DC home and wants to stay for additional days then that will require consistent participation. Pt in agreement. OT provided family w/ folder including all AE recommendations that need to be purchased on [employer] and provided the sister with a list of all retina specialists 25 miles away from the pts home. All questions answered and mtg  ended w/ PT ambulating pt in the james to allow the family to see his functional performance. OT was present for this mtg from 13:20-14:00.

## 2025-04-30 NOTE — ASSESSMENT & PLAN NOTE
#ESRD on HD TTS:  Dialysis unit/days: [facility]  Access: [facility]  Underwent hemodialysis yesterday and stayed on treatment longer though treatment was cut 16 minutes short due to back pain.  Educated on compliance with full treatment.  1.8 L was removed on dialysis post weight 93.7 kg.  Plan for next dialysis tomorrow May 1st  Yesterday's blood work showed a sodium of 132 and a potassium of 5.5, these were pre-hemodialysis labs.

## 2025-04-30 NOTE — PROGRESS NOTES
OT daily treatment note       04/30/25 1320   Assessment   Treatment Assessment Family mtg occurred w/ pt, his sister, the sisters significant other, this writer (OT), PT Shawna, CM Patti and Dr Guan. Discussion was had re: pt and families concerns w/ discharging home believing he would benefit from additional time to allow him to live independently. The significant other expressed his concern that the sister is going to experience cg burden due to the sister being the person that the pt primarily calls when he is home. Both PT and OT spent time addressing these concerns explaining that the pt is grossly IND w/ use of RW, LHAE and a walker tray which we've been practicing with during his sessions. OT edu that the only true assistance the pt will need is with filling a pillbox 1x/wk and going grocery shopping which the sister is agreeable to. The significant other persisted with his concerns stating the walker is unsafe and he needs to use the SPC. PT edu re: his performance with the RW including when navigating the stairs. Pt/family was re-edu that pt will have cont therapy and nsg services at home that will be able to have eyes on him on his non-dialysis days. CM edu that based on his performance, he is set for DC and the only barrier to DC is solidifying transportation <> dialysis. See CM notes for additional details. OT strongly encouraged pt to continue to participate daily with therapy to allow for him to increase his overall confidence with his ability to perform ADL/IADLS at home. OT informed the family and team that the pt has been refusing to complete bathing and LB dressing so if the pt is concerned with his ability to DC home and wants to stay for additional days then that will require consistent participation. Pt in agreement. OT provided family w/ folder including all AE recommendations that need to be purchased on [employer] and provided the sister with a list of all retina specialists 25 miles  away from the pts home. All questions answered and mtg ended w/ PT ambulating pt in the james to allow the family to see his functional performance. OT was present for this mtg from 13:20-14:00.   OT Therapy Minutes   OT Time In 1320   OT Time Out 1400   OT Total Time (minutes) 40   OT Mode of treatment - Individual (minutes) 10   OT Mode of treatment - Concurrent (minutes) 0   OT Mode of treatment - Group (minutes) 0   OT Mode of treatment - Co-treat (minutes) 0   OT Mode of Treatment - Total time(minutes) 10 minutes   OT Cumulative Minutes 1320   Therapy Time missed   Time missed? No

## 2025-04-30 NOTE — ASSESSMENT & PLAN NOTE
Volume mediated  Blood pressure and volume status improving with ultrafiltration on dialysis.  Low-sodium diet   Nifedipine 30 mg daily   Continue ultrafiltration with dialysis.

## 2025-04-30 NOTE — CASE MANAGEMENT
"1315 family meeting occurred involving pt, his sister and her sig other, (marlene and jonny) cm, celestino castro, PT< Bettina T OT and dr mcelroy.  Family needed multiple redirection to focus on the discussion at hand. It was confirmed pt does not need to go to a snf for more rehab. Jonny feels pt needs \"a migue\" to visit him daily or assisted living. CM explained pts living situation is not going to change between now and dc. It is beneficial for pt to return home to his environment due to his visual deficits. Cm offered to make a referral to aging for follow up in the home to assist w/options. They are in agreement. It was confirmed pt would have [facility] services on dc for nursing pt and ot. They were made aware once dialysis and transportation are arranged pt is to be dc'd. Following the meeting marlene and jonny observed pt with therapy as they feel pt \"does not walk well\" and \"cannot do the stairs\".  Jonny was insistent that pt learn to walk with a cane due to his history of falls. Jonny then wanted pt to lay on the floor to practice getting up with therapy. It was explained due to spinal precautions that was not an appropriate task at the moment.   "

## 2025-05-01 ENCOUNTER — APPOINTMENT (INPATIENT)
Dept: DIALYSIS | Facility: HOSPITAL | Age: 65
DRG: 939 | End: 2025-05-01
Attending: STUDENT IN AN ORGANIZED HEALTH CARE EDUCATION/TRAINING PROGRAM
Payer: MEDICARE

## 2025-05-01 PROCEDURE — 97530 THERAPEUTIC ACTIVITIES: CPT

## 2025-05-01 PROCEDURE — 99232 SBSQ HOSP IP/OBS MODERATE 35: CPT | Performed by: NURSE PRACTITIONER

## 2025-05-01 PROCEDURE — 97116 GAIT TRAINING THERAPY: CPT

## 2025-05-01 PROCEDURE — 99232 SBSQ HOSP IP/OBS MODERATE 35: CPT | Performed by: INTERNAL MEDICINE

## 2025-05-01 PROCEDURE — 97110 THERAPEUTIC EXERCISES: CPT

## 2025-05-01 PROCEDURE — 99232 SBSQ HOSP IP/OBS MODERATE 35: CPT | Performed by: STUDENT IN AN ORGANIZED HEALTH CARE EDUCATION/TRAINING PROGRAM

## 2025-05-01 PROCEDURE — 97535 SELF CARE MNGMENT TRAINING: CPT

## 2025-05-01 RX ADMIN — ACETAMINOPHEN 975 MG: 325 TABLET, FILM COATED ORAL at 14:33

## 2025-05-01 RX ADMIN — OXYCODONE HYDROCHLORIDE 10 MG: 10 TABLET ORAL at 16:50

## 2025-05-01 RX ADMIN — ACETAMINOPHEN 975 MG: 325 TABLET, FILM COATED ORAL at 21:02

## 2025-05-01 RX ADMIN — Medication 2.5 MG: at 14:33

## 2025-05-01 RX ADMIN — ALLOPURINOL 100 MG: 100 TABLET ORAL at 09:00

## 2025-05-01 RX ADMIN — CALCIUM CARBONATE (ANTACID) CHEW TAB 500 MG 500 MG: 500 CHEW TAB at 16:43

## 2025-05-01 RX ADMIN — NIFEDIPINE 30 MG: 30 TABLET, FILM COATED, EXTENDED RELEASE ORAL at 17:02

## 2025-05-01 RX ADMIN — SEVELAMER HYDROCHLORIDE 1600 MG: 800 TABLET ORAL at 11:32

## 2025-05-01 RX ADMIN — ATORVASTATIN CALCIUM 80 MG: 80 TABLET, FILM COATED ORAL at 16:43

## 2025-05-01 RX ADMIN — ACETAMINOPHEN 975 MG: 325 TABLET, FILM COATED ORAL at 05:21

## 2025-05-01 RX ADMIN — DOCUSATE SODIUM 100 MG: 100 CAPSULE, LIQUID FILLED ORAL at 17:02

## 2025-05-01 RX ADMIN — Medication 6 MG: at 21:02

## 2025-05-01 RX ADMIN — OXYCODONE HYDROCHLORIDE 10 MG: 10 TABLET ORAL at 23:21

## 2025-05-01 RX ADMIN — SEVELAMER HYDROCHLORIDE 1600 MG: 800 TABLET ORAL at 09:00

## 2025-05-01 RX ADMIN — SEVELAMER HYDROCHLORIDE 1600 MG: 800 TABLET ORAL at 16:43

## 2025-05-01 RX ADMIN — METHOCARBAMOL 1000 MG: 500 TABLET ORAL at 14:33

## 2025-05-01 RX ADMIN — DOCUSATE SODIUM 100 MG: 100 CAPSULE, LIQUID FILLED ORAL at 09:00

## 2025-05-01 RX ADMIN — EPOETIN ALFA 8000 UNITS: 4000 SOLUTION INTRAVENOUS; SUBCUTANEOUS at 16:28

## 2025-05-01 RX ADMIN — METHOCARBAMOL 1000 MG: 500 TABLET ORAL at 21:02

## 2025-05-01 RX ADMIN — OXYCODONE HYDROCHLORIDE 10 MG: 10 TABLET ORAL at 12:21

## 2025-05-01 RX ADMIN — METHOCARBAMOL 1000 MG: 500 TABLET ORAL at 05:21

## 2025-05-01 NOTE — CASE MANAGEMENT
Cm phoned [employer] and after waiting on hold to inquire about their services over 8 min cm disconnected the call. Their website identifies their service similarly as uber/lyft.  Cm phoned [facility] [phone] and he stated he would charge $20 each way from pts address to dialysis. Pt made aware.     Cm phoned hilary harrell, [phone] and [place]'s ext [place] [phone]. Left mssg inquiring about chair time as pt is going to need to rely on family or paying for transport for services. Cm anticipates dc Monday pending start time/chair time of dialysis. Referral made to [facility] for rn pt and ot services via aidin.     1536 [facility] declined referral due to staffing. Referral made to [facility] of [facility]

## 2025-05-01 NOTE — PROGRESS NOTES
"Progress Note - Hospitalist   Name: Naresh Carpio 64 y.o. male I MRN: [id number]  Unit/Bed#: -01 I Date of Admission: 4/11/2025   Date of Service: 5/1/2025 I Hospital Day: 20    Assessment & Plan  Traumatic retroperitoneal hematoma  Admitted to the trauma service after a fall with L2 vertebral body and retroperitoneal hematoma.  S/p IR embolization of distal right deep circumflex iliac artery/distal right superior gluteal artery both of which supplied an area of active extravasation and then pseudoaneurysms present at distal branches of the left inferior gluteal artery. The artery was embolized using coils and gelfoam.   He was type and screened 4/17/25 in case needed blood during HD on that day.  Hgb 4/22 improved to 8.4 and 4/29 was 8.0  Anemia due to chronic kidney disease, on chronic dialysis ([facility])  Had 5 U PRBCs during hospital stay  Hemoglobin has been running mostly 7.3-7.4.    On 4/14/25 was 7.6; on 4/15/24 was 7.2 ---> Renal increased his Epogen in light of his hemoglobin  CBC Tuesday in HD 4/29/25 = 8.0 and stable  ESRD (end stage renal disease) ([facility])  Continue Sevelamer TID with meals  Renal following  HD T-TH-Sat while here in rehab  Paroxysmal atrial fibrillation ([facility])  On no rate control medications  Examines RRR  Anticoagulation with Eliquis as at home but he doesn't want to take it because he is worried it will make his right eye vision worse.  I d/w on call Optho Dr Diaz = \"The risk is not quantified in the literature. Dr edmond's note suggests a diagnosis of proliferative diabetic retinopathy and bilateral and recurrent vitreous hemorrhage. So he is at risk for - and has had hemorrhage - whether or not he is on eliquis (assuming dr edmond is correct).     I would always recommend life and death are more important so to do what you recommend re his systemic condition, and then to follow up with dr edmond as he recommended so a real exam can be done in the clinic\".   Explained all this " "to him and he understands  He had been taking the Eliquis again and now refusing it because he is afraid of his foot bleeding.  He says \"no point in discussing it\"; I d/w him but again refuses to take it.  He is fully aware that he may have a stroke if he does not take the Eliquis  Chronic diastolic (congestive) heart failure (HCC)  Some LE edema = chronic/stable  Renal diet  stable  Volume management via HD  Primary hypertension  Continue Procardia XL 30 mg qd  Tx per renal  Stable; no changes today 4/30/25  Visual disturbance  Reported a right eye \"floater\" without associated pain earlier in his hospitalization which has resolved but continued to have blurry central vision.  Ophthalmology saw here in [facility] = visual acuity without correction is 20/400 OU. Dx: Proliferative diabetic retinopathy with vitreous hemorrhage OU as well as cataracts.   Will need outpatient follow up immediately after discharge from rehab  Diabetic ulcer of left midfoot associated with type 2 diabetes mellitus, limited to breakdown of skin (HCC)  Continue local care  Being followed by WC  Last LEADS was 3/28/25 = 50-75% right prox polital and prox tibioperoneal trunk; >75% stenosis in prox popliteal artery on left  Was seen by VS in 12/2024 hospital stay and he declined any intervention at the time  Podiatry saw here on 4/22/25, s/p debridement = no surgical intervention needed at this time  Had new bleeding from foot wound 4/27/25. The bleeding then resolved. Podiatry was made aware.  Hyponatremia  Renal following  Continue fluid restriction 1200 cc per 24 hours  Closed fracture of proximal end of left humerus with routine healing  Occurred from a fall in February and eval'd at the time of the injury  Was to continue NWB/sling and followup with Ortho but there was no followup done  Xray 4/13/25 = \"Unchanged alignment of the chronic greater tuberosity avulsion fracture\".   Ortho saw here on 4/14 = WBAT; see Dr Cisneros 4 weeks @ " 5/14/25  Gout  Continue Allopurinol  L2 vertebral fracture (HCC)  CT CAP 4/2: Fracture of L2 vertebral body extending to the superior endplate  Continue LSO brace  Insomnia  Patient reports not sleeping well at night because he has trouble getting comfortable. He has been trying to sleep in the recliner   PAD (peripheral artery disease) (HCC)  Vascular surgery saw per Podiatry's recommendation = they will see in the office.  They advised start ASA but after the d/w the PA in relation to his vitreous hemorrhages, can hold off on that and d/w him in the office.  This will give him time to see the Retina specialist as OP as about getting tx    The above assessment and plan was reviewed and updated as determined by my evaluation of the patient on 5/1/2025.    History of Present Illness   Patient seen and examined. Patients overnight issues or events were reviewed with nursing staff. New or overnight issues include the following:   No new or overnight issues.  Continues to refuse his Eliquis    Review of Systems    Objective :  Temp:  [97.5 °F (36.4 °C)-97.7 °F (36.5 °C)] 97.5 °F (36.4 °C)  HR:  [62-83] 67  BP: (137-166)/(29-84) 137/79  Resp:  [16-20] 16  SpO2:  [96 %-99 %] 99 %  O2 Device: None (Room air)    Invasive Devices       Hemodialysis Catheter  Duration             HD Permanent Double Catheter 610 days                    Physical Exam  General Appearance: no distress, nontoxic appearing  HEENT:  External ear normal.  Nose normal w/o drainage. Mucous membranes are moist. Oropharynx is clear. Conjunctiva clear w/o icterus or redness.  Neck:  Supple, normal ROM  Lungs: BBS without crackles/wheeze/rhonchi; respirations unlabored with normal inspiratory/expiratory effort.  No retractions noted.  On RA  CV: regular rate and rhythm; no rubs/murmurs/gallops, PMI normal   ABD: Abdomen is soft.  Bowel sounds all quadrants.  Nontender with no distention.    EXT: + LE edema L>R = ACE wrapped feet to knees  Skin: normal  turgor, normal texture  Psych: affect normal, mood normal  Neuro: AAO      The above physical exam was reviewed and updated as determined by my evaluation of the patient on 5/1/2025.      Lab Results: I have reviewed the following results:  Results from last 7 days   Lab Units 04/29/25  1350   WBC Thousand/uL 7.55   HEMOGLOBIN g/dL 8.0*   HEMATOCRIT % 26.3*   PLATELETS Thousands/uL 224     Results from last 7 days   Lab Units 04/29/25  1350   SODIUM mmol/L 132*   POTASSIUM mmol/L 5.5*   CHLORIDE mmol/L 96   CO2 mmol/L 23   BUN mg/dL 60*   CREATININE mg/dL 8.38*   CALCIUM mg/dL 9.2                   Imaging Results Review: No pertinent imaging studies reviewed.  Other Study Results Review: No additional pertinent studies reviewed.    Review of Scheduled Meds: Medications  reviewed and reconciled as needed  Current Facility-Administered Medications   Medication Dose Route Frequency Provider Last Rate    acetaminophen  975 mg Oral Q8H [facility] Damian Cerda MD      allopurinol  100 mg Oral Daily Damian Cerda MD      aluminum-magnesium hydroxide-simethicone  30 mL Oral Q4H PRN GRANT Church      apixaban  5 mg Oral BID Damian Cerda MD      atorvastatin  80 mg Oral Daily With Dinner Damian Cerda MD      bisacodyl  10 mg Rectal Daily PRN Michelle Landa,       calcium carbonate  500 mg Oral Daily PRN GRANT Church      docusate sodium  100 mg Oral BID Michelle Landa, DO      epoetin jessica  8,000 Units Intravenous After Dialysis German Linton MD      gabapentin  100 mg Oral Once per day on Monday Wednesday Friday Damian Cerda MD      lidocaine  1 patch Topical Daily PRN Damian Cerda MD      melatonin  6 mg Oral HS Jayden Rowland MD      methocarbamol  1,000 mg Oral Q8H [facility] Jayden Rowland MD      naloxone  0.04 mg Intravenous Q1MIN PRN Damian Cerda MD      NIFEdipine  30 mg Oral After Dinner Damian Cerda MD      ondansetron  4 mg Oral Q6H PRN Gino Austin PA-C      oxyCODONE  5 mg Oral Q4H  PRN Damian Cerda MD      Or    oxyCODONE  10 mg Oral Q4H PRN Damian Cerda MD      oxyCODONE  2.5 mg Oral Q8H PRN Jayden Rowland MD      polyethylene glycol  17 g Oral Daily PRN Michelle Landa, DO      senna  2 tablet Oral Daily With Lunch Michelle Landa, DO      sevelamer  1,600 mg Oral TID With Meals Damian Cerda MD      white petrolatum-mineral oil   Topical TID PRN Michelle Landa, DO         VTE Pharmacologic Prophylaxis: Eliquis  Code Status: Level 1 - Full Code  Current Length of Stay: 20 day(s)    Administrative Statements     ** Please Note:  voice to text software may have been used in the creation of this document. Although proof errors in transcription or interpretation are a potential of such software**

## 2025-05-01 NOTE — ASSESSMENT & PLAN NOTE
"- Reporting \"floater\" within the right eye with acute onset on 4/11/25 at mid-day while watching TV; describes a dark, \"spider\"-shaped floater that occupies a significant portion of the visual field, painless; has similar floaters in the left eye but smaller  - Ophthalmology consulted on [facility] admission by IM, per prior trauma surgery documentation, Dr. Chaudhry of ophthalmology was made personally aware of clinical situation on 4/11/25 prior to [facility] admission  > Ophthalmology consult 4/12: Exam c/w proliferative diabetic retinopathy with vitreous hemorrhage. Pt will follow up with ophtho immediately after discharge and for referral to a retina specialist  > 4/15- Pt thinks his R eye floater may be larger today, unable to clearly state or quantify symptoms. No other visual complaints of increased number of floaters, double/blurry vision, or bright flashes. Advised patient to inform us if symptoms change - will reach out to Ophthalmology accordingly  > 4/18- No changes noted in vision/floaters. His overall impaired vision continues to be a barrier for his ADLs/iADLs  > 4/20-24- Stable R eye floater    - Monitor for changes in vision  "

## 2025-05-01 NOTE — ASSESSMENT & PLAN NOTE
Wt Readings from Last 3 Encounters:   05/01/25 94.5 kg (208 lb 6.4 oz)   04/11/25 98.5 kg (217 lb 2.5 oz)   03/24/25 107 kg (234 lb 12.6 oz)     - Most recent TTE from 10/9/24 with EF 60-65%, grade 1 diastolic dysfunction  - Volume management with HD per nephrology  - Monitor weights  - Appreciate medicine and nephrology recommendations and management

## 2025-05-01 NOTE — ASSESSMENT & PLAN NOTE
"On no rate control medications  Examines RRR  Anticoagulation with Eliquis as at home but he doesn't want to take it because he is worried it will make his right eye vision worse.  I d/w on call Optho Dr Diaz = \"The risk is not quantified in the literature. Dr edmond's note suggests a diagnosis of proliferative diabetic retinopathy and bilateral and recurrent vitreous hemorrhage. So he is at risk for - and has had hemorrhage - whether or not he is on eliquis (assuming dr edmond is correct).     I would always recommend life and death are more important so to do what you recommend re his systemic condition, and then to follow up with dr edmond as he recommended so a real exam can be done in the clinic\".   Explained all this to him and he understands  He had been taking the Eliquis again and now refusing it because he is afraid of his foot bleeding.  He says \"no point in discussing it\"; I d/w him but again refuses to take it.  He is fully aware that he may have a stroke if he does not take the Eliquis  "

## 2025-05-01 NOTE — CASE MANAGEMENT
"Cm met w/pt and reviewed team mtg update. Cm informed pt the county vans [place] not able to assist w/transportation to dialysis due to where he lives and the dialysis location [place] in a different county. Cm also explained his other option of [facility] in [place] does not have any chair availability. Cm will investigate two other options but pt has been made aware he may need to pay for transport by uber/lyft/taxi and his sister is going to need to assist as well. Pt expressed embarassment for Jonny's behavior yesterday and apologized. Pt expressed fear of returning home alone and cm counseled. 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  Pt again made aware he is ready for dc when transportation options for dialysis are arranged/exhausted.   "

## 2025-05-01 NOTE — ASSESSMENT & PLAN NOTE
Vascular surgery saw per Podiatry's recommendation = they will see in the office.  They advised start ASA but after the d/w the PA in relation to his vitreous hemorrhages, can hold off on that and d/w him in the office.  This will give him time to see the Retina specialist as OP as about getting tx   unknown

## 2025-05-01 NOTE — ASSESSMENT & PLAN NOTE
- Sustained from mechanical fall several days prior to initial presentation on 4/2/25, presenting with progressively worsening low back pain. Trauma imaging revealed a large retroperitoneal hematoma with active extravasation and hemoglobin drop. Now s/p transfusion, Kcentra and ultimately IR embolization of the right deep circumflex iliac artery and superior gluteal artery with Dr. Garcia on 4/3/25. Unfortunately, on 4/6/25, his hemoglobin decreased and repeat CT showed a 5 mm inferior gluteal artery branch pseudoaneurysm and he is s/p IR pelvic angiogram on 4/7/25 with Dr. Hernandez, which demonstrated pseudoaneurysms at the distal branches of the left inferior gluteal artery which was embolized.   > 4/15: Hgb 7.2 today (from 7.6 yesterday). Back pain seems controlled. Will be receiving increased Epogen today per IM/Nephro  > Hgb 8.0 on 4/29 labs    - Continue to trend Hgb on CBC closely  - Consider repeat imaging if Hgb continues to downtrend or pain significantly worsens  - Contact IR if there are additional bleeding concerns given multiple prior interventions described above  - Local right hip/groin access site incision care  - Analgesia, see below  - PT and OT

## 2025-05-01 NOTE — ASSESSMENT & PLAN NOTE
#ESRD on HD TTS:  Dialysis unit/days: [facility]  Access: PermCath  Educated on compliance with full treatment.  1.8 L was removed on dialysis post weight 93.7 kg.  Plan for dialysis this afternoon  Check repeat labs at dialysis   No

## 2025-05-01 NOTE — PLAN OF CARE
Problem: METABOLIC, FLUID AND ELECTROLYTES - ADULT  Goal: Electrolytes maintained within normal limits  Description: INTERVENTIONS:- Monitor labs and assess patient for signs and symptoms of electrolyte imbalances- Administer electrolyte replacement as ordered- Monitor response to electrolyte replacements, including repeat lab results as appropriate- Instruct patient on fluid and nutrition as appropriate  Outcome: Progressing  Goal: Fluid balance maintained  Description: INTERVENTIONS:- Monitor labs - Monitor I/O and WT- Instruct patient on fluid and nutrition as appropriate- Assess for signs & symptoms of volume excess or deficit  Outcome: Progressing   Target UF Goal  1.5  L as tolerated. Patient dialyzing for  3.5 hours  on 2 K bath for serum K of  5.5 from 4/29/25  per protocol. Treatment plan reviewed with Nephrology.     Post-Dialysis RN Treatment Note    Blood Pressure:  Pre 163/78 mm/Hg  Post 156/67 mmHg   EDW:  96.5 kg    Weight:  Pre 94.5 kg   Post 93.2 kg   Mode of weight measurement: Standing Scale   Volume Removed:  1361 ml    Treatment duration: 195 minutes    NS given:  No    Treatment shortened Yes, describe: 15 minutes off early as per patient request due to back pain and needed pain med   Medications given during Rx: Epogen 8000 units   Estimated Kt/V:  1.10   Access type: Permacath/TDC   Needle Gauge:  na   Access Issues: No    Report called to primary nurse:   Yes Cecily Thomason RN

## 2025-05-01 NOTE — HOME EXERCISE EDUCATION
Program_ID:[id number]   Access Code: [email or web address]  URL: [email or web address]  Date: 05-  Prepared By: Naresh Brown    Program Notes      Exercises      - Sidelying Hip Abduction - 1 x daily - 7 x weekly - 3 sets - 10 reps      - Active Straight Leg Raise with Quad Set - 1 x daily - 7 x weekly - 3 sets - 10 reps

## 2025-05-01 NOTE — ASSESSMENT & PLAN NOTE
Volume mediated, and pain related as well  Blood pressure overall pretty well-controlled slightly high this morning monitor with ultrafiltration and dialysis.  Low-sodium diet   Nifedipine 30 mg daily   Continue ultrafiltration with dialysis.

## 2025-05-01 NOTE — ASSESSMENT & PLAN NOTE
> Evaluated by APS on acute care  - Tylenol 975 mg q8h scheduled  - Gabapentin 100 mg three times weekly  - Methocarbamol 1000 mg q8h scheduled  - Lidocaine patches daily PRN  - Oxycodone 5 or 10 mg q4h PRN with 2.5mg Q8 for breakthrough pain, wean as possible

## 2025-05-01 NOTE — PROGRESS NOTES
"OT daily tx note     05/01/25 0700   Pain Assessment   Pain Assessment Tool 0-10   Pain Score 4   Pain Location/Orientation Location: Back   Hospital Pain Intervention(s) Emotional support;Rest   Restrictions/Precautions   Precautions Cognitive;Fluid restriction;Fall Risk;Spinal precautions;Visual deficit;Pain   ROM Restrictions Yes  (lumbar spinal prec)   Braces or Orthoses LSO   Lifestyle   Autonomy \"I just need my phone again\"   Oral Hygiene   Type of Assistance Needed Independent   Physical Assistance Level No physical assistance   Comment in stance at sink folowing breakfast   Oral Hygiene CARE Score 6   Shower/Bathe Self   Type of Assistance Needed Set-up / clean-up   Physical Assistance Level No physical assistance   Comment seated in recliner to complete SB using wipes; 8/10 body parts complete w/ simulation using LHS as he did not wish to use due to wound on R foot   Shower/Bathe Self CARE Score 5   Upper Body Dressing   Type of Assistance Needed Independent   Physical Assistance Level No physical assistance   Comment seated and can complete all UB dress ind including LSO mgmt   Upper Body Dressing CARE Score 6   Lower Body Dressing   Type of Assistance Needed Supervision   Physical Assistance Level No physical assistance   Comment cooperative to change underwear/pants; seated and w/ inc time can complete using LHR for threading and ind w/ cm in stance. CS needed for threading w/ VC for maintaining spinal prec and help manage emotions when frustrated   Lower Body Dressing CARE Score 4   Putting On/Taking Off Footwear   Comment requesting to not change socks as he does not want to irritate foot; BLE ace wraps still on this AM and notified [facility] who will clarify order as he is reporting they told him to keep them on overnight. will edu pt on when to wear at home when order clarified and practicve removing in prep for home as HH therapies can A w/ wrapping   Reason if not Attempted Refused to perform   Putting " On/Taking Off Footwear CARE Score 7   Sit to Stand   Type of Assistance Needed Independent   Physical Assistance Level No physical assistance   Comment w/ RW   Sit to Stand CARE Score 6   Bed-Chair Transfer   Type of Assistance Needed Independent   Physical Assistance Level No physical assistance   Comment w/ RW   Chair/Bed-to-Chair Transfer CARE Score 6   Toileting Hygiene   Type of Assistance Needed Independent   Physical Assistance Level No physical assistance   Comment cm/hygiene   Toileting Hygiene CARE Score 6   Toilet Transfer   Type of Assistance Needed Independent   Physical Assistance Level No physical assistance   Comment w/ RW   Toilet Transfer CARE Score 6   Exercise Tools   Other Exercise Tool 1 Pt engaged in UB strengthening using 4# DB for 10 x 3 reps with Good tolerance. Pt completed bicep curls, shoulder raises, chest press, and abduction raises. Pt educated on energy conservation techniques and utilized rest breaks as needed. Pt benefits from activity to improve UB strengthening, activity tolerance, and endurance to facilitate independence w/ ADL/IADLs.   Cognition   Overall Cognitive Status Impaired   Arousal/Participation Cooperative   Attention Attends with cues to redirect   Orientation Level Oriented X4   Memory Decreased recall of precautions   Following Commands Follows one step commands without difficulty   Activity Tolerance   Activity Tolerance Patient limited by pain   Assessment   Treatment Assessment Pt engaged in skilled OT session with focus on ADL Retraining , LB Dressing, UB dressing,  LHAE education/training, Functional Transfers, Gross motor strengthening , Energy conservation training/education, healthy coping education, Leisure and social pursuits, and community re-integration. Pt is limited by weakness, impaired balance, decreased endurance, increased fall risk, decreased ADLS, decreased IADLS, pain, decreased activity tolerance, decreased cognition, and visual deficits. Pt  agreeable to therapy this AM and participated in ADL session. Cont practice needed using LHR and maintaining spinal prec when completing LB dressing. Cont edu completed using LHR for reaching items on floor when dropped from safety standpoint and maintaining prec. Pt at times very frustrated using LHR and instructed to take his time and keep practicing. Pt cont to be very fatigued in AM but pain more tolerable today compared to yesterday. OT services are warranted to address above barriers.   Prognosis Good   Problem List Decreased strength;Decreased endurance;Impaired balance;Decreased mobility;Orthopedic restrictions;Pain;Impaired vision   Barriers to Discharge Decreased caregiver support   Plan   Treatment/Interventions ADL retraining;Functional transfer training   Progress Progressing toward goals   OT Therapy Minutes   OT Time In 0700   OT Time Out 0830   OT Total Time (minutes) 90   OT Mode of treatment - Individual (minutes) 90   OT Mode of treatment - Concurrent (minutes) 0   OT Mode of treatment - Group (minutes) 0   OT Mode of treatment - Co-treat (minutes) 0   OT Mode of Treatment - Total time(minutes) 90 minutes   OT Cumulative Minutes 1410   Therapy Time missed   Time missed? No

## 2025-05-01 NOTE — PROGRESS NOTES
Progress Note - Nephrology   Name: Naresh Carpio 64 y.o. male I MRN: [id number]  Unit/Bed#: -01 I Date of Admission: 4/11/2025   Date of Service: 5/1/2025 I Hospital Day: 20     Assessment & Plan  ESRD (end stage renal disease) ([facility])  #ESRD on HD TTS:  Dialysis unit/days: [facility]  Access: PermCath  Educated on compliance with full treatment.  1.8 L was removed on dialysis post weight 93.7 kg.  Plan for dialysis this afternoon  Check repeat labs at dialysis  Primary hypertension  Volume mediated, and pain related as well  Blood pressure overall pretty well-controlled slightly high this morning monitor with ultrafiltration and dialysis.  Low-sodium diet   Nifedipine 30 mg daily   Continue ultrafiltration with dialysis.  Anemia due to chronic kidney disease, on chronic dialysis ([facility])  Continue SURINDER  Check repeat CBC at dialysis    Traumatic retroperitoneal hematoma  S/p IR embolization  Hemoglobin stable  Secondary hyperparathyroidism ([facility])  Sevelamer with meals 3 times daily   Continue renal diet  Continue current management    PAD (peripheral artery disease) ([facility])      I have reviewed the nephrology recommendations including assessment and plan, with patient, and we are in agreement with renal plan including the information outlined above. Nephrology service will follow.    Subjective   Brief History of Admission -  64-year-old male with a history of end-stage renal disease currently in the rehabilitation unit after being treated for traumatic retroperitoneal hemorrhage     Continues to have back pain.    Objective :  Temp:  [97.6 °F (36.4 °C)-97.7 °F (36.5 °C)] 97.7 °F (36.5 °C)  HR:  [62-83] 66  BP: (139-166)/(29-73) 166/73  Resp:  [17-18] 17  SpO2:  [96 %-99 %] 99 %  O2 Device: None (Room air)    Current Weight: Weight - Scale: 94.5 kg (208 lb 6.4 oz)  First Weight: Weight - Scale: 98.9 kg (218 lb)  I/O         04/29 0701 04/30 0700 04/30 0701 05/01 0700 05/01 0701 05/02 0700    P.O. 120 780 100    I.V.  (mL/kg) 500 (5.3)      Total Intake(mL/kg) 620 (6.6) 780 (8.3) 100 (1.1)    Urine (mL/kg/hr) 0 (0)      Other 2312      Stool 0      Total Output 2312      Net -1692 +780 +100           Unmeasured Urine Occurrence 1 x      Unmeasured Stool Occurrence 1 x            Physical Exam  Vitals and nursing note reviewed.   Constitutional:       General: He is not in acute distress.     Appearance: He is well-developed. He is not diaphoretic.   HENT:      Head: Normocephalic and atraumatic.   Eyes:      General: No scleral icterus.     Pupils: Pupils are equal, round, and reactive to light.   Cardiovascular:      Rate and Rhythm: Normal rate and regular rhythm.      Heart sounds: Normal heart sounds. No murmur heard.     No friction rub. No gallop.   Pulmonary:      Effort: Pulmonary effort is normal. No respiratory distress.      Breath sounds: Normal breath sounds. No wheezing or rales.   Chest:      Chest wall: No tenderness.   Abdominal:      General: Bowel sounds are normal. There is no distension.      Palpations: Abdomen is soft.      Tenderness: There is no abdominal tenderness. There is no rebound.   Musculoskeletal:         General: Normal range of motion.      Cervical back: Normal range of motion and neck supple.   Skin:     Findings: No rash.   Neurological:      Mental Status: He is alert and oriented to person, place, and time.         Medications:    Current Facility-Administered Medications:     acetaminophen (TYLENOL) tablet 975 mg, 975 mg, Oral, Q8H KEMAL, Damian Cerda MD, 975 mg at 05/01/25 0521    allopurinol (ZYLOPRIM) tablet 100 mg, 100 mg, Oral, Daily, Damian Cerda MD, 100 mg at 05/01/25 0900    aluminum-magnesium hydroxide-simethicone (MAALOX) oral suspension 30 mL, 30 mL, Oral, Q4H PRN, GRANT Church, 30 mL at 04/28/25 2121    apixaban (ELIQUIS) tablet 5 mg, 5 mg, Oral, BID, Damian Cerda MD, 5 mg at 04/27/25 1700    atorvastatin (LIPITOR) tablet 80 mg, 80 mg, Oral, Daily With Dinner, Damian  MD Prashant, 80 mg at 04/30/25 1643    bisacodyl (DULCOLAX) rectal suppository 10 mg, 10 mg, Rectal, Daily PRN, Michelle Landa DO, 10 mg at 04/18/25 0000    calcium carbonate (TUMS) chewable tablet 500 mg, 500 mg, Oral, Daily PRN, GRANT Church, 500 mg at 04/30/25 1604    docusate sodium (COLACE) capsule 100 mg, 100 mg, Oral, BID, Michelleaura Landa, DO, 100 mg at 05/01/25 0900    epoetin jessica (EPOGEN,PROCRIT) injection 8,000 Units, 8,000 Units, Intravenous, After Dialysis, German Linton MD, 8,000 Units at 04/29/25 1612    gabapentin (NEURONTIN) capsule 100 mg, 100 mg, Oral, Once per day on Monday Wednesday Friday, Damian Cerda MD, 100 mg at 04/30/25 0757    lidocaine (LIDODERM) 5 % patch 1 patch, 1 patch, Topical, Daily PRN, Damian Cerda MD, 1 patch at 04/18/25 2135    melatonin tablet 6 mg, 6 mg, Oral, HS, Jayden Rowland MD, 6 mg at 04/30/25 2111    methocarbamol (ROBAXIN) tablet 1,000 mg, 1,000 mg, Oral, Q8H KEMAL, Jayden Rowland MD, 1,000 mg at 05/01/25 0521    naloxone (NARCAN) 0.04 mg/mL syringe 0.04 mg, 0.04 mg, Intravenous, Q1MIN PRN, Damian Cerda MD    NIFEdipine (PROCARDIA XL) 24 hr tablet 30 mg, 30 mg, Oral, After Dinner, Damian Cerda MD, 30 mg at 04/30/25 1647    ondansetron (ZOFRAN-ODT) dispersible tablet 4 mg, 4 mg, Oral, Q6H PRN, Gino Austin PA-C, 4 mg at 04/20/25 0023    oxyCODONE (ROXICODONE) IR tablet 5 mg, 5 mg, Oral, Q4H PRN, 5 mg at 04/29/25 0558 **OR** oxyCODONE (ROXICODONE) immediate release tablet 10 mg, 10 mg, Oral, Q4H PRN, Damian Cerda MD, 10 mg at 04/30/25 1526    oxyCODONE (ROXICODONE) split tablet 2.5 mg, 2.5 mg, Oral, Q8H PRN, Jayden Rowland MD, 2.5 mg at 04/29/25 0749    polyethylene glycol (MIRALAX) packet 17 g, 17 g, Oral, Daily PRN, Michelle Landa DO, 17 g at 04/26/25 1259    senna (SENOKOT) tablet 17.2 mg, 2 tablet, Oral, Daily With Lunch, Michelle Landa DO, 17.2 mg at 04/30/25 1138    sevelamer (RENAGEL) tablet 1,600 mg, 1,600 mg, Oral, TID With Meals,  "Damian Cerda MD, 1,600 mg at 05/01/25 0900    white petrolatum-mineral oil (EUCERIN,HYDROCERIN) cream, , Topical, TID PRN, Michelle Landa DO      Lab Results: I have reviewed the following results:  Results from last 7 days   Lab Units 04/29/25  1350   WBC Thousand/uL 7.55   HEMOGLOBIN g/dL 8.0*   HEMATOCRIT % 26.3*   PLATELETS Thousands/uL 224   POTASSIUM mmol/L 5.5*   CHLORIDE mmol/L 96   CO2 mmol/L 23   BUN mg/dL 60*   CREATININE mg/dL 8.38*   CALCIUM mg/dL 9.2       Administrative Statements   I have spent a total time of 20 minutes in caring for this patient on the day of the visit/encounter including Counseling / Coordination of care, Documenting in the medical record, Reviewing/placing orders in the medical record (including tests, medications, and/or procedures), and Obtaining or reviewing history  .  Portions of the record may have been created with voice recognition software. Occasional wrong word or \"sound a like\" substitutions may have occurred due to the inherent limitations of voice recognition software. Read the chart carefully and recognize, using context, where substitutions have occurred.If you have any questions, please contact the dictating provider.  "

## 2025-05-01 NOTE — PROGRESS NOTES
05/01/25 1000   Pain Assessment   Pain Assessment Tool 0-10   Pain Score 8   Pain Location/Orientation Orientation: Lower;Location: Back   Pain Onset/Description Onset: Ongoing   Patient's Stated Pain Goal No pain   Hospital Pain Intervention(s) Rest   Restrictions/Precautions   Precautions Cognitive;Fall Risk;Pain;Spinal precautions;Visual deficit   ROM Restrictions   (lumbar spinal prec)   Braces or Orthoses LSO   Cognition   Overall Cognitive Status Impaired   Arousal/Participation Alert;Uncooperative   Attention Attends with cues to redirect   Orientation Level Oriented X4   Memory Decreased recall of precautions   Following Commands Follows one step commands without difficulty   Sit to Stand   Type of Assistance Needed Independent;Adaptive equipment   Comment RW   Sit to Stand CARE Score 6   Bed-Chair Transfer   Type of Assistance Needed Independent;Adaptive equipment   Comment RW   Chair/Bed-to-Chair Transfer CARE Score 6   Transfer Bed/Chair/Wheelchair   Adaptive Equipment Roller Walker   Walk 10 Feet   Type of Assistance Needed Independent;Adaptive equipment   Comment RW   Walk 10 Feet CARE Score 6   Walk 50 Feet with Two Turns   Type of Assistance Needed Independent;Adaptive equipment   Comment RW   Walk 50 Feet with Two Turns CARE Score 6   Walk 150 Feet   Type of Assistance Needed Independent;Adaptive equipment   Comment RW   Walk 150 Feet CARE Score 6   Ambulation   Primary Mode of Locomotion Prior to Admission Walk   Distance Walked (feet) 150 ft  (x2, 350', short bouts outside on sidewalk)   Assist Device Roller Walker   Gait Pattern Inconsistant Lisa;Slow Lisa;Decreased foot clearance;Forward Flexion;Wide GARO;Shuffle;Improper weight shift   Limitations Noted In Heel Strike;Posture;Balance;Safety;Speed;Strength;Swing   Provided Assistance with: Direction   Walk Assist Level Modified Independent   Does the patient walk? 2. Yes   Wheelchair mobility   Does the patient use a wheelchair? 0. No    Curb or Single Stair   Style negotiated Curb   Type of Assistance Needed Supervision;Adaptive equipment;Verbal cues   Comment CS with VC's to get closer to curb prior to stepping down. Outside curb x2 reps with RW   1 Step (Curb) CARE Score 4   4 Steps   Type of Assistance Needed Supervision;Incidental touching;Adaptive equipment   Comment CS/CGA on outside steps x4, LHR and RW in R hand for added support ascending fwd. When atempting to descend fwd, pt looked unsafe and was asked to descend bwds. Pt refused to perform anymore steps after.   4 Steps CARE Score 4   Stairs   Type Stairs;Curb   # of Steps 4   Weight Bearing Precautions Fall Risk   Assist Devices Single Rail;Roller Walker   Toilet Transfer   Type of Assistance Needed Independent;Adaptive equipment   Comment RW   Toilet Transfer CARE Score 6   Toilet Transfer   Surface Assessed Standard Toilet   Therapeutic Interventions   Other ace wraps and shoes donned start of session.   Other Comments   Comments (S)  Extensive conversation regarding stair method at home. Pt was able to perform retro descend with RW and HR with less assist and is a viable option for home. Pt was a long tangent on what he did before and that he never came down the 8 steps, only up. When asked where he would need to be picked up for dialysis he dis state at the 8 steps. Pt educated on importance of cont practicing steps with his current method or finding another method he feels more confident with. Also had discussion with pt regarding someone bringing his RW to bottom of steps and utilize B hands on the HR which would offer him more support. Pt agreeable to trial next session as he was refusing to perform more steps.   Assessment   Treatment Assessment Pt cont to work towards increased I on steps to decreased burden of care at home. Pt had many excuses and was unable to visualize different methods for stair management. Pt reported being fearful on steps but when descending bwds he  only required CG/CS. Pt had increased difficulty placing RW down and having room for his feet when descending fwd making it very unsafe. Pt then became angry and wanted to go up FF fwd but was educated on importance of coming down the steps as he will need to at home. Pt will cont POC as tolerated with cont focus on gait outside on ramp, stair management and curb step management to decrease burden of care and decrease risk of falls.   Problem List Decreased strength;Decreased endurance;Impaired balance;Decreased mobility;Orthopedic restrictions;Pain;Impaired vision   Barriers to Discharge Decreased caregiver support   PT Barriers   Functional Limitation Car transfers;Stair negotiation;Standing;Transfers;Walking   Plan   Treatment/Interventions Functional transfer training;LE strengthening/ROM;Therapeutic exercise;Endurance training;Cognitive reorientation;Gait training   Progress Progressing toward goals   Discharge Recommendation   Equipment Recommended Walker   PT Therapy Minutes   PT Time In 1000   PT Time Out 1130   PT Total Time (minutes) 90   PT Mode of treatment - Individual (minutes) 90   PT Mode of treatment - Concurrent (minutes) 0   PT Mode of treatment - Group (minutes) 0   PT Mode of treatment - Co-treat (minutes) 0   PT Mode of Treatment - Total time(minutes) 90 minutes   PT Cumulative Minutes 1410   Therapy Time missed   Time missed? No

## 2025-05-01 NOTE — TEAM CONFERENCE
Acute RehabilitationTeam Conference Note  Date: 5/1/2025   Time: 10:37 AM       Patient Name:  Naresh Carpio       Medical Record Number: [id number]   YOB: 1960  Sex: Male          Room/Bed:  [facility]4/[facility] 964-01  Payor Info:  Payor: MEDICARE / Plan: MEDICARE A AND B / Product Type: Medicare A & B Fee for Service /      Admitting Diagnosis: Traumatic retroperitoneal hemorrhage [S36.899A]   Admit Date/Time:  4/11/2025  6:19 PM  Admission Comments: No comment available     Primary Diagnosis:  Traumatic retroperitoneal hematoma  Principal Problem: Traumatic retroperitoneal hematoma    Patient Active Problem List    Diagnosis Date Noted    Insomnia 04/13/2025    Hyperphosphatemia 04/12/2025    Wound of skin 04/12/2025    At high risk for skin breakdown 04/12/2025    At risk for venous thromboembolism (VTE) 04/12/2025    Impaired mobility and activities of daily living 04/12/2025    Visual disturbance 04/11/2025    Pseudoaneurysm (HCC) 04/08/2025    Acute pain due to trauma 04/05/2025    ESRD on dialysis ([facility]) 04/04/2025    Wounds, multiple 04/04/2025    Traumatic retroperitoneal hemorrhage 04/03/2025    Secondary hyperparathyroidism ([facility]) 04/03/2025    At risk for electrolyte imbalance 04/03/2025    Multiple open wounds of foot 03/26/2025    Disorder of acid-base balance 03/24/2025    L2 vertebral fracture ([facility]) 03/24/2025    Non-compliance with treatment 03/24/2025    Generalized weakness 02/24/2025    Gout 02/18/2025    Closed fracture of proximal end of left humerus with routine healing 02/10/2025    Left shoulder pain 02/08/2025    ESRD (end stage renal disease) on dialysis ([facility]) 01/16/2025    Primary hypertension 01/16/2025    Anemia in ESRD (end-stage renal disease)  ([facility]) 01/16/2025    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, on chronic dialysis ([facility]) 01/16/2025    Renal lesion 12/19/2024    Chronic wound 11/20/2024    Chronic diastolic  (congestive) heart failure ([facility]) 11/19/2024    Pleural effusion 11/19/2024    Paroxysmal atrial fibrillation ([facility])     ESRD (end stage renal disease) ([facility]) 10/25/2024    Hyponatremia 10/19/2024    Acute on chronic anemia 10/14/2024    PAD (peripheral artery disease) ([facility]) 10/08/2024    Difficulty with speech 03/19/2024    History of amputation of hallux ([facility]) 03/18/2024    At risk for constipation 09/06/2023    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 mellitus, with necrosis of bone ([facility])     Acquired deformity of foot, right     Charcot's joint     Open wound of right foot 08/21/2023    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 mellitus, with bone involvement without evidence of necrosis ([facility])     Diabetic ulcer of left midfoot associated with type 2 diabetes mellitus, limited to breakdown of skin ([facility])     Diabetic polyneuropathy associated with type 2 diabetes mellitus ([facility])     Diabetes mellitus type 2 with peripheral artery disease ([facility])     History of amputation of lesser toe of left foot ([facility])     Onychomycosis     Traumatic retroperitoneal hematoma 08/19/2023    Osteoarthritis of left hip 07/27/2022    Severe Left Orchalgia 07/26/2022    Right shoulder pain 07/26/2022    Left hip pain 07/06/2022    Hemodialysis status ([facility])     Hypervolemia     Anemia due to chronic kidney disease, on chronic dialysis ([facility]) 01/21/2022    History of prediabetes     Hypertension     History of TIA (transient ischemic attack)     T10 vertebral fracture ([facility])        Physical Therapy:    Weight Bearing Status: Full Weight Bearing  Transfers: Independent  Bed Mobility: Independent (sleeps in recliner)  Amulation Distance (ft): 350 feet  Ambulation: Supervision, Independent (DS-Judi)  Assistive Device for Ambulation: Roller Walker  Number of Stairs: 8  Assistive Device for Stairs: Lehft Hand Rail  Stair Assistance: Supervision  Discharge Recommendations: Home with:  DC Home with:: Family  Support, Home Physical Therapy    4/30/25: Ed has improved in his overall LE strength, endurance and standing/ambulatory balance which is demonstrated in his continued mod I with RW for ambulation, S for stair negotiation and mod I with RW for in room mobility. Family meeting took place today with pt, pt sister Diana and her significant other to discuss progress, current plan for DC and to show them his CLOF. Verbalized and demonstrated that patient is doing well with his functional mobility and is at the point that he is able to be DC home pending CM finding dialysis transport. PT POC currently focusing on  FF steps and NAZARIO with no HR, with goals to progress to Prabhjot before discharge- continue to practice this in future therapy sessions. Current PT POC to continue with interventions to address inc balance and righting reactions B LE strength and cardiovascular endurance with LE TE and repetitive functional task practice. Continue to review IADL task practice and offer additional practice with tasks that pt previously declined as after family meeting pt may be more willing to participate in activities not previously done.     4/23/25: Ed continues to have impairments in LE strength, standing balance, and cardiovascular endurance. He is also limited by his back pain, fatigue from dialysis, dec vision (pt reports L eye blurry, R eye blurry and floater), B LE wounds and coccyx wound. He has barriers to home that include NAZARIO, dec caregiver support for IADLs. To address his current impairments and barriers, interventions have included LE TE, pain management (LE TE/flexibility, repositioning, STS for pressure relief), endurance training through increasing ambulation distances/Nustep, and to address current balance deficits pt is utilizing RW for ambulation/transfers. Additionally, repetitive task practice for transfers and stair negotiation has been done and pt has demonstrated improvement as he only requires (S), however  he is still limited by his fatigue and with continued dialysis this may be a limitation. He has been able to manage his LSO Brace. Team is currently trying to identify possible solutions for pt to be able to get to/from dialysis so he can be d/c home. Will begin to practice more IADLs with pt to improve in home mobility and dec fall risk.Current PT POC is to coordinate with OT to progress pt to Prabhjot in Room with RW and to be (I) with LSO management.     Naresh presents to [facility] s/p a fall at home with an L2 fracture. Medically he has DM, diabetic neuropathy and decreased vision, and is on HD. He has increased back pain and use of LSO for L2 fracture. He has been living alone in an apartment. Biggest barriers at time of eval were increased back pain and limited ability to participate in therapy as a result. Additional barriers to home have been NAZARIO, dec caregiver availability and recent decline in vision that results in dec balance, dec ability to perform IADLs. Interventions have been therex, endurance training, use of RW, and functional task practice,. Since eval he has been able to progress to CS for functional mobility with RW as of 4/16, however this is for basic transfers and walking. Due to having limited caregiver availability, he will need to manage RW up/down NAZARIO as well as all IADLs in the apartment (cooking, laundry, cleaning). As a team we are looking into resources for him to have transportation to HD and resources like meals on wheels to assist in dec burden of IADLs due to limited overall mobility and vision. PT POC will cont to focus on progressing BLE strength, activity tolerance, managing LSO brace, to progress stairs, walking distance, and (I) with functional mobility with use of RW.     Occupational Therapy:  Eating: Independent  Grooming: Independent  Bathing:  (setup)  Bathing:  (setup)  Upper Body Dressing: Independent  Lower Body Dressing: Supervision  Toileting: Independent  Tub/Shower  Transfer: Independent  Toilet Transfer: Independent  Cognition: Exceptions to WNL  Cognition: Decreased Memory, Decreased Executive Functions, Decreased Attention, Decreased Safety, Behavioral Considerations  Orientation: Place, Person, Time, Situation  Discharge Recommendations: Home with:  DC Home with:: Family Support, First Floor Setup, Home Occupational Therapy       04/16/25  Pt is demonstrating fair progress with occupational therapy and is progressing toward long term goals for ADL, IADL, and functional transfers/mobility. Pts long term goals for ADLs are Independent with Rolling Walker. Pt is currently Partial/moderate assistance  for ADLs. Pt continues to present with impairments in activity tolerance, endurance, standing balance/tolerance, UE strength, memory, insight, safety , judgement , and visual perceptual skills . Occupational performance remains limited by fatigue, pain, spinal precautions, impulsivity, (R) visual deficits , (L) visual deficits , decreased caregiver support, and risk for falls. Family training/education will be required prior to D/C. Pt will continue to benefit from skilled acute rehab OT services to address above mentioned barriers and maximize functional independence in baseline areas of occupation to meet established treatment goals with overall decreased burden of care. Plan of care to continue to focus on ADL Retraining , LB Dressing, UB dressing,  LHAE education/training, IADL training , Kitchen Mobilty, Meal preparation, Medication management , Functional Transfers, Functional Cognition, Functional Attention, Standing tolerance, Standing balance , Gross motor strengthening , Visual perceptual skills, Visual scanning, Low vision education/training, Family training/education, Energy conservation training/education, healthy coping education, Leisure and social pursuits, and community re-integration.     Anticipate Discharge date to be set.     04/23/25: Pt is demonstrating good  progress with occupational therapy and is progressing toward long term goals for ADL, IADL, and functional transfers/mobility. Pts long term goals for ADLs are Independent with Rolling Walker. Pt is currently Supervision or touching assistance-  for ADLs. Pt continues to present with impairments in activity tolerance, endurance, visual perceptual skills , and depth perception . Occupational performance remains limited by fatigue, pain, spinal precautions, decreased caregiver support, risk for falls, and home environment. Family training/education will not be required prior to D/C. Pt will continue to benefit from skilled acute rehab OT services to address above mentioned barriers and maximize functional independence in baseline areas of occupation to meet established treatment goals with overall decreased burden of care. Plan of care to continue to focus on ADL Retraining ,  LHAE education/training, IADL training , Kitchen Mobilty, Meal preparation, Medication management , Functional Transfers, Standing tolerance, Standing balance , Low vision education/training, DME training/education, Energy conservation training/education, healthy coping education, Leisure and social pursuits, and community re-integration. Goals for the upcoming week are: completing IADLs and preparing for DC.     Anticipate DC this wk pending transport setup w/ dialysis.     04/30/25  Pt is demonstrating good progress with occupational therapy and is progressing toward long term goals for ADL, IADL, and functional transfers/mobility. Pts long term goals for ADLs are Independent with Rolling Walker. Pt is currently Set up or clean-up assistance  for ADLs. Pt continues to present with impairments in activity tolerance, endurance, standing balance/tolerance, UE strength, memory, insight, safety , judgement , and visual perceptual skills . Occupational performance remains limited by fatigue, pain, spinal precautions, (R) visual deficits , (L) visual  deficits , decreased caregiver support, and risk for falls. Family training/education will occur today 4/30. Pt will continue to benefit from skilled acute rehab OT services to address above mentioned barriers and maximize functional independence in baseline areas of occupation to meet established treatment goals with overall decreased burden of care. Plan of care to continue to focus on ADL Retraining , LB Dressing, UB dressing,  LHAE education/training, IADL training , Kitchen Mobilty, Meal preparation, Medication management , Functional Transfers, Functional Cognition, Functional Attention, Standing tolerance, Standing balance , Gross motor strengthening , Low vision education/training, DME training/education, Family training/education, Energy conservation training/education, healthy coping education, Leisure and social pursuits, community re-integration, and return to work simulation.     Anticipate DC this wk pending transport setup w/ dialysis.       Speech Therapy:           No notes on file    Nursing Notes:     Diet Type: Regular/House                      Diet Patient/Family Education Complete: No                         Type of Wound Patient/Family Education: No  Bladder: Continent     Bladder Patient/Family Education: No  Bowel: Continent     Bowel Patient/Family Education: No  Pain Location/Orientation: Location: Back  Pain Score: 0                       Hospital Pain Intervention(s): Emotional support, Rest  Pain Patient/Family Education: No  Medication Management/Safety  Safe Administration: No  Reason for non-safe administration: non-compliant    65 y/o male with a medical history of but not limited to chronic anemia; DM; ESRD on HD; hyperlipidemia ; HTN ; hyperkalemia ; left toe amputation ; TIA  who initially presented to [facility] on 4/2/25 after falling a few days prior onto a rail but had progressive pain to lower mid back. Of note he was recently admitted to the hospital after a  fall where he was diagnosed with an L2 fracture. He reported multiple recent falls with no dizziness, chest pain, lightheadedness. His falls were described as mechanical. He was found to have a large retroperitoneal hematoma on the right and the fore-mentioned L2 fracture. CTA of the abdomen and pelvis showed extravasation of contrast. He was transferred to [facility] on 4/3/25 for further eval / assessment / consultation. During hospital course / stay he went to IR for embolization of his deep circumflex iliac artery and superior gluteal artery. Nephrology was consulted for hemodialysis management. His hemoglobin trended down and he was transfused with a total of 4 PBRCs during hospital course.  He underwent CTA of the abdomen and pelvis which showed a 5 mm pseudoaneurysm of the inferior gluteal artery branch.  He underwent embolization of the pseudoaneurysm.  He was started on subcu heparin for DVT prophylaxis and his hemoglobin closely monitored with stable hemoglobin send DVT prophylaxis he was transition to his home Eliquis dosing.  His hemoglobin remained stable on Eliquis. He is WBAT to all extremities. Pain has been managed with PO and IV PRN analgesics. PT/OT therapies were consulted and continue to follow patient at this time and are recommending inpatient acute rehab when medically stable. All involved medical disciplines feel/agree patient is medically ready for discharge at this time. Inpatient acute rehabilitation physician was consulted. Upon review of patient's case and correspondence with PT/OT therapy services, [facility] physician feels Edwilfredo will benefit and is a good candidate / appropriate for inpatient acute rehab at this time. He has demonstrated the willingness / desire and tolerance to participate in the required 3 hours or more of therapies per day.     5/1/25-This week we will encourage independence with ADLs.  We will monitor labs and vital signs.  We will educate pt/family  about repositioning to prevent skin breakdown.  We will assist w repositioning and perform routine skin checks. We will continue to assist and educate with dsg changes. We will monitor for adequate pain control.  We will monitor for constipation and medicate pt as ordered.  We will increase safety awareness and keep pt free from falls.      Case Management:     Discharge Planning  Living Arrangements: Lives Alone  Support Systems: Family members  Assistance Needed: may need at D/C  Type of Current Residence: Private residence  Current Home Care Services: No  Pt has achieved goals functionally but arrangement of dialysis and transportation continue to be a barrier. Family meeting held and they are aware of pts abilities. Cm to continue to work on dc barriers and arrange hhc when dc date is known.     Is the patient actively participating in therapies? yes  List any modifications to the treatment plan:     Barriers Interventions   Transportation for dialysis Working on options, vs pt will need to pay                     Is the patient making expected progress toward goals? yes  List any update or changes to goals:     Medical Goals: Patient will be medically stable for discharge to [facility] upon completion of rehab program and Patient will be able to manage medical conditions and comorbid conditions with medications and follow up upon completion of rehab program    Weekly Team Goals:   Rehab Team Goals  ADL Team Goal: Patient will be independent with ADLs with least restrictive device upon completion of rehab program  Transfer Team Goal: Patient will be independent with transfers with least restrictive device upon completion of rehab program  Locomotion Team Goal: Patient will be independent with locomotion with least restrictive device upon completion of rehab program    Discussion: pt continues to work with therapy for increased independence on stairs. Otherwise has achieved functional goals  with adls and is using a roller walker for mobility. Pt is expected to return home and hhc services will be arranged. Area on aging referral to be made at time of dc and cm to investigate options for meals to reduce level of assist from pts sister.     Anticipated Discharge Date:  pending finalizing of tranportation services.   [facility] Team Members Present:The following team members are supervising care for this patient and were present during this Weekly Team Conference.    Physician: Dr. Freida DO  : JACKI Hooker  Registered Nurse: Cecily Thomason RN  Physical Therapist: Niharika Magallanes DPT  Occupational Therapist: Simone Esparza MS, OTR/L  Speech Therapist:

## 2025-05-01 NOTE — HOME EXERCISE EDUCATION
Program_ID:[id number]   Access Code: [email or web address]  URL: [email or web address]  Date: 05-  Prepared By: Naresh Brown    Program Notes      Exercises      - Sidelying Hip Abduction - 1 x daily - 7 x weekly - 3 sets - 10 reps      - Active Straight Leg Raise with Quad Set - 1 x daily - 7 x weekly - 3 sets - 10 reps      - Seated Long Arc Quad - 1 x daily - 7 x weekly - 3 sets - 10 reps

## 2025-05-01 NOTE — PROGRESS NOTES
Progress Note - PMR   Name: Naresh Carpio 64 y.o. male I MRN: [id number]  Unit/Bed#: -01 I Date of Admission: 4/11/2025   Date of Service: 5/1/2025 I Hospital Day: 20     Assessment & Plan  Traumatic retroperitoneal hematoma  - Sustained from mechanical fall several days prior to initial presentation on 4/2/25, presenting with progressively worsening low back pain. Trauma imaging revealed a large retroperitoneal hematoma with active extravasation and hemoglobin drop. Now s/p transfusion, Kcentra and ultimately IR embolization of the right deep circumflex iliac artery and superior gluteal artery with Dr. Garcia on 4/3/25. Unfortunately, on 4/6/25, his hemoglobin decreased and repeat CT showed a 5 mm inferior gluteal artery branch pseudoaneurysm and he is s/p IR pelvic angiogram on 4/7/25 with Dr. Hernandez, which demonstrated pseudoaneurysms at the distal branches of the left inferior gluteal artery which was embolized.   > 4/15: Hgb 7.2 today (from 7.6 yesterday). Back pain seems controlled. Will be receiving increased Epogen today per IM/Nephro  > Hgb 8.0 on 4/29 labs    - Continue to trend Hgb on CBC closely  - Consider repeat imaging if Hgb continues to downtrend or pain significantly worsens  - Contact IR if there are additional bleeding concerns given multiple prior interventions described above  - Local right hip/groin access site incision care  - Analgesia, see below  - PT and OT  Acute pain due to trauma  > Evaluated by APS on acute care  - Tylenol 975 mg q8h scheduled  - Gabapentin 100 mg three times weekly  - Methocarbamol 1000 mg q8h scheduled  - Lidocaine patches daily PRN  - Oxycodone 5 or 10 mg q4h PRN with 2.5mg Q8 for breakthrough pain, wean as possible  Impaired mobility and activities of daily living  - Rehabilitation medicine physician for daily monitoring of care, 24 hour availability for acute medical issues, medication management, and therapeutic and diagnostic assessments.  - 24 hour  rehabilitation nursing 7 days per week for: management/teaching of medications, bowel/bladder routine, skin care.  - PT, OT for 2-3 hours per day, 5 to 6 days per week; 15 hours per week  - Rehabilitation Psychology as needed for adjustment and coping  - CM for barriers to discharge, community resources, and family support  - Discharge planning following to help ensure a safe and efficient discharge  - 900/7 program due to HD status, pain, expected therapy tolerance  4/18- Ongoing dispo planning with patient/sister and CM. Staff expressed that patient is progressing well from a functional standpoint but continues to have difficulties with ADLs/iADLs due to impaired vision. Will continue to work towards goals for safe dischare  4/22- DC date now pending outpatient HD setup    L2 vertebral fracture (HCC)  > Sustained from a mechanical fall on 3/24/25, found to have an acute, obliquely oriented fracture of the L2 vertebral body with distraction of the dominant fracture fragment and involvement of the anterior and posterior cortices and superior endplate  > At that time, managed non-operatively with LSO bracing, though it does not appear orthopedic spine surgery or neurosurgery evaluated at the time of injury    - Maintain LSO brace with lumbar spinal precautions   - Should follow-up with spine surgery outpatient  Closed fracture of proximal end of left humerus with routine healing  - Sustained from fall in early February, per CT of the LUE on 2/9/25, there was a minimally displaced fracture of the anterior facet of the greater tuberosity of the left humerus, minimally apparent on repeat x-ray of the shoulder on 3/24/25  - He was evaluated by orthopedic surgery at the time of the initial injury and was recommended to maintain NWB through the LUE with a sling  - There is no further orthopedic evaluation documentation and it does not appear that further follow-up occurred.  - Clinically, he is not describing ongoing  "significant left arm or shoulder pain and has been weight-bearing through the LUE  > 4/14: Repeat humerus X-ray done yesterday shows no change in fracture alignment. Ortho consulted.  >4/15: Ortho c/s: May WBAT to LUE. PT/OT to increase strength and ROM. F/u with Dr. Cisneros in 4 weeks    - Monitor  - PT/OT  Insomnia  Patient reports he has an overall a poor sleep hygiene: he sleeps at 1-2am nightly and watches TV up until bed.  Advised patient to attempt to regulate sleep/wake cycle while here to better participate in therapy. Attempt to try to sleep earlier around midnight  >4/15: reviewed sleep log: Pt went to sleep around midnight and slept for 6 hours.  > 4/17- Poor sleep last night: was awake every other hour. Requiring nap during the day. Can increase melatonin tomorrow if this persists  Pt endorses moving from bed to chair multiple times throughout the night which is his baseline at home too    - Continue sleep log  - Continue melatonin 6mg nightly  At risk for constipation   4/15- Adjusted bowel medications: Colace 100mg BID, Senna 17.2mg with lunch and PRN Miralax/Suppos  4/17- No BM since 4/13. Lactulose this evening if no BM after lunch. Can consider Relistor if no success for opioid-inducted constipation  4/18- small hard BMs x2 yesterday after lactulose and suppository. Continue bowel regimen  >BM 4/22 but required Relistor and suppository/lactulose  > Last BM 4/29    - Will follow BMs and adjust bowel regimen to target soft, formed stools q1-2 days, per pt's regular schedule.  Anemia due to chronic kidney disease, on chronic dialysis (HCC)  > 4/14: Hgb 7.6  > 4/15: Hgb 7.2 - Discussed with IM who is coordinating with Nephrology; they will increase his Epoetin jessica  > 4/17: Hgb 7.7  > 4/29: Hgb 8.0    - Trend Hgb on CBC  - Epoetin jessica with dialysis  - Transfusions per IM, appreciate their recommendations  Visual disturbance  - Reporting \"floater\" within the right eye with acute onset on 4/11/25 at " "mid-day while watching TV; describes a dark, \"spider\"-shaped floater that occupies a significant portion of the visual field, painless; has similar floaters in the left eye but smaller  - Ophthalmology consulted on [facility] admission by IM, per prior trauma surgery documentation, Dr. Chaudhry of ophthalmology was made personally aware of clinical situation on 4/11/25 prior to [facility] admission  > Ophthalmology consult 4/12: Exam c/w proliferative diabetic retinopathy with vitreous hemorrhage. Pt will follow up with ophtho immediately after discharge and for referral to a retina specialist  > 4/15- Pt thinks his R eye floater may be larger today, unable to clearly state or quantify symptoms. No other visual complaints of increased number of floaters, double/blurry vision, or bright flashes. Advised patient to inform us if symptoms change - will reach out to Ophthalmology accordingly  > 4/18- No changes noted in vision/floaters. His overall impaired vision continues to be a barrier for his ADLs/iADLs  > 4/20-24- Stable R eye floater    - Monitor for changes in vision  Diabetic ulcer of left midfoot associated with type 2 diabetes mellitus, limited to breakdown of skin (HCC)  - Has chronic wounds affecting the left foot plantar surface, left foot 1st and 2nd digits  - Left Plantar Foot Wounds and Left toes:  Cleanse with NSS and pat dry. Paint all wounds with Betadine daily and cover plantar open wound with silicone bordered foam dressing. Naren with T for Treatment and change every other day or PRN   > 4/22 : Wound care recs: Paint left foot 1st and 2nd digits with betadine dailyand covering plantar open wound with foam. Podiatry consulted   > 4/22: Podiatry did bedside debridement of left foot plantar ulcer. Continue with local wound care, no surgical intervention needed. SALVATORE ordered and vascular surgery consulted. Pt to maintain frequent LLE elevation, strict pressure and fracture reduction. WBAT to LLE  > 4/24: Pictures of " LLE reviewed on chart. Wounds are stable with no signs of infection. XR L foot 4/22 with no evidence of OM. ABIs and toe pressures are within healing range. Pt to continue to follow up with wound care for further management  PAD (peripheral artery disease) (HCC)  > Non invasive arterial studies from 3/28:  Diffuse disease noted throughout the femoral-popliteal arteries. Evidence of 50-75% stenosis in the proximal popliteal artery. Evidence of 50-75% stenosis at the proximal tibio-peroneal trunk.   LLE: Diffuse disease noted throughout the femoral-popliteal arteries. Evidence of >75% stenosis in the proximal popliteal artery.  Tech Note: Patient refused SALVATORE.  SALVATORE 4/22: Left lower limb SALVATORE noncompressible, metatarsal pressure 155 mmHg, great toe pressure 72 mmHg. Right lower limb noncompressible SALVATORE, metatarsal pressure 158 mmHg, second toe pressure 101 mmHg   Vascular surgery consult pending  Hyponatremia  > 4/14: Na 129 - A&O x3, pt mentating appropriately  > 4/15: Na 125 - Discussed with IM who is coordinating with Nephrology; they will manage Na during HD   > Sodium levels continue to fluctuate from 125-131. Pt remains A&O x3. Nephrology will continue to manage electrolyte deficiencies    - Trend on BMP  - Electrolyte management per nephrology  - Continue HD in setting of ESRD  ESRD (end stage renal disease) (HCC)  - Continue HD  - Nephrology consulted and following, appreciate their recommendations  >4/22: new outpatient HD center needed close to his home after discharge. CM to follow up. DC pushed  Paroxysmal atrial fibrillation (HCC)  - Currently rate controlled  - Embolic stroke risk reduction with apixaban 5 mg BID    >4/22- Refusing it this morning. Extensive conversation had with patient to discuss the importance of compliance given his high stroke risk.   > 4/24- now compliant with Eliquis  Chronic diastolic (congestive) heart failure (HCC)  Wt Readings from Last 3 Encounters:   05/01/25 94.5 kg (208 lb  6.4 oz)   04/11/25 98.5 kg (217 lb 2.5 oz)   03/24/25 107 kg (234 lb 12.6 oz)     - Most recent TTE from 10/9/24 with EF 60-65%, grade 1 diastolic dysfunction  - Volume management with HD per nephrology  - Monitor weights  - Appreciate medicine and nephrology recommendations and management  Primary hypertension  - Nifedipine  - Appreciate IM and nephrology management  Gout  - Allopurinol for flare prophylaxis  Hyperphosphatemia  > 4/15: Phos 4.6  - Management per nephrology  - Continue phosphate binders  Wound of skin  - Has right anterior pre-tibial scab, wash gently daily, leave open to air  - Wound care RN consulted and following  At high risk for skin breakdown  > 4/21- New sacral pressure injury noted by staff. Pictures reviewed on chart. Patient primarily sleeps in recliner (both here and at home). Discussed with therapy and pt will use a cushion that offers sacral relief on recliner. Will continue to monitor  >4/22- Wound care recs:  Cleanse wound with NSS, pat dry. Apply calcium alginate with silver to wound bed and cover with foam. Change every other day and prn. Cushion will not be covered per therapy for home - advised patient to find one off [employer] to continue offloading     - Moisturize skin daily with skin nourishing cream  - Ehob cushion in chair when out of bed.  - Preventative hydraguard to bilateral heels BID and PRN.   - Calazime paste to sacrum/buttocks BID and PRN  - Monitor clinically for breakdown, frequent turns  - Wound care RN consulted and following  At risk for venous thromboembolism (VTE)  - Apixaban, see above  Secondary hyperparathyroidism (HCC)      Subjective   64-year-old male with history of chronic anemia, atrial fibrillation, diabetes, end-stage renal disease on hemodialysis presenting on 4/2 with progressively worsening back pain after a fall and found to have large retroperitoneal hematoma with active extravasation status post Kcentra stabilization with transfusions and  multiple IR embolizations. Course complicated by acute on chronic pain     Chief Complaint: f/u ambulatory dysfunction    Interval: Patient seen and examined. No acute overnight events. CM still working to get transport for HD outpatient- updates to follow. Otherwise, pt denies any fevers, chills, chest pain, sob, abdominal pain, nausea/vomiting, lightheadedness or dizziness. Will go to HD later today. Sleep continues to be poor. Eating most of his meals. Last BM 4/29. No new labs to review today.      Objective :  Temp:  [97.6 °F (36.4 °C)-97.7 °F (36.5 °C)] 97.7 °F (36.5 °C)  HR:  [62-83] 66  BP: (139-166)/(29-73) 166/73  Resp:  [17-18] 17  SpO2:  [96 %-99 %] 99 %  O2 Device: None (Room air)    Functional Update:  Mobility: Ind with bed mobility, Prabhjot with RW for ambulation (150'), supervision with steps x4  Transfers: Ind with RW for transfers  ADLs: Ind eating/oral care. Bathing and footwear deferred    Physical Exam  Vitals reviewed.   Constitutional:       Appearance: Normal appearance.   HENT:      Head: Normocephalic and atraumatic.      Right Ear: External ear normal.      Left Ear: External ear normal.      Nose: Nose normal.      Mouth/Throat:      Mouth: Mucous membranes are moist.   Eyes:      Conjunctiva/sclera: Conjunctivae normal.   Cardiovascular:      Rate and Rhythm: Normal rate and regular rhythm.      Heart sounds: Normal heart sounds.   Pulmonary:      Effort: Pulmonary effort is normal. No respiratory distress.      Breath sounds: Normal breath sounds. No wheezing.   Abdominal:      General: Bowel sounds are normal. There is no distension.      Palpations: Abdomen is soft.   Musculoskeletal:      Right lower leg: Edema present.      Left lower leg: Edema present.      Comments: +LSO. Bilateral pitting peripheral edema (LLE>RLE) with ACE wraps on   Skin:     General: Skin is warm and dry.   Neurological:      Mental Status: He is alert. Mental status is at baseline.         Scheduled  Meds:  Current Facility-Administered Medications   Medication Dose Route Frequency Provider Last Rate    acetaminophen  975 mg Oral Q8H [facility] Damian Cerda MD      allopurinol  100 mg Oral Daily Damian Cerda MD      aluminum-magnesium hydroxide-simethicone  30 mL Oral Q4H PRN GRANT Church      apixaban  5 mg Oral BID Damian Cerda MD      atorvastatin  80 mg Oral Daily With Dinner Damian Cerda MD      bisacodyl  10 mg Rectal Daily PRN Michelle Landa,       calcium carbonate  500 mg Oral Daily PRN GRANT Church      docusate sodium  100 mg Oral BID Michelle Landa, DO      epoetin jessica  8,000 Units Intravenous After Dialysis German Linton MD      gabapentin  100 mg Oral Once per day on Monday Wednesday Friday Damian Cerda MD      lidocaine  1 patch Topical Daily PRN Damian Cerda MD      melatonin  6 mg Oral HS Jayden Rowland MD      methocarbamol  1,000 mg Oral Q8H [facility] Jayden Rowland MD      naloxone  0.04 mg Intravenous Q1MIN PRN Damian Cerda MD      NIFEdipine  30 mg Oral After Dinner Damian Cerda MD      ondansetron  4 mg Oral Q6H PRN Gino Austin PA-C      oxyCODONE  5 mg Oral Q4H PRN Damian Cerda MD      Or    oxyCODONE  10 mg Oral Q4H PRN Damian Cerda MD      oxyCODONE  2.5 mg Oral Q8H PRN Jayden Rowland MD      polyethylene glycol  17 g Oral Daily PRN Michelle Landa, DO      senna  2 tablet Oral Daily With Lunch Michelle Landa, DO      sevelamer  1,600 mg Oral TID With Meals Damian Cerda MD      white petrolatum-mineral oil   Topical TID PRN Michelle Landa,            Lab Results: I have reviewed the following results:  Results from last 7 days   Lab Units 04/29/25  1350   HEMOGLOBIN g/dL 8.0*   HEMATOCRIT % 26.3*   WBC Thousand/uL 7.55   PLATELETS Thousands/uL 224     Results from last 7 days   Lab Units 04/29/25  1350   BUN mg/dL 60*   SODIUM mmol/L 132*   POTASSIUM mmol/L 5.5*   CHLORIDE mmol/L 96   CREATININE mg/dL 8.38*

## 2025-05-02 PROBLEM — E11.621 TYPE 2 DIABETES MELLITUS WITH FOOT ULCER, WITHOUT LONG-TERM CURRENT USE OF INSULIN (HCC): Status: ACTIVE | Noted: 2025-05-02

## 2025-05-02 PROBLEM — L97.509 TYPE 2 DIABETES MELLITUS WITH FOOT ULCER, WITHOUT LONG-TERM CURRENT USE OF INSULIN (HCC): Status: ACTIVE | Noted: 2025-05-02

## 2025-05-02 PROCEDURE — 99232 SBSQ HOSP IP/OBS MODERATE 35: CPT | Performed by: INTERNAL MEDICINE

## 2025-05-02 PROCEDURE — 99232 SBSQ HOSP IP/OBS MODERATE 35: CPT | Performed by: NURSE PRACTITIONER

## 2025-05-02 PROCEDURE — 97530 THERAPEUTIC ACTIVITIES: CPT

## 2025-05-02 PROCEDURE — 97110 THERAPEUTIC EXERCISES: CPT

## 2025-05-02 PROCEDURE — 99232 SBSQ HOSP IP/OBS MODERATE 35: CPT | Performed by: STUDENT IN AN ORGANIZED HEALTH CARE EDUCATION/TRAINING PROGRAM

## 2025-05-02 PROCEDURE — 97116 GAIT TRAINING THERAPY: CPT

## 2025-05-02 RX ADMIN — Medication 6 MG: at 21:14

## 2025-05-02 RX ADMIN — METHOCARBAMOL 1000 MG: 500 TABLET ORAL at 05:15

## 2025-05-02 RX ADMIN — ALLOPURINOL 100 MG: 100 TABLET ORAL at 07:52

## 2025-05-02 RX ADMIN — SEVELAMER HYDROCHLORIDE 1600 MG: 800 TABLET ORAL at 17:28

## 2025-05-02 RX ADMIN — DOCUSATE SODIUM 100 MG: 100 CAPSULE, LIQUID FILLED ORAL at 07:52

## 2025-05-02 RX ADMIN — OXYCODONE HYDROCHLORIDE 10 MG: 10 TABLET ORAL at 12:27

## 2025-05-02 RX ADMIN — OXYCODONE HYDROCHLORIDE 10 MG: 10 TABLET ORAL at 07:54

## 2025-05-02 RX ADMIN — ACETAMINOPHEN 975 MG: 325 TABLET, FILM COATED ORAL at 05:15

## 2025-05-02 RX ADMIN — METHOCARBAMOL 1000 MG: 500 TABLET ORAL at 21:15

## 2025-05-02 RX ADMIN — SEVELAMER HYDROCHLORIDE 1600 MG: 800 TABLET ORAL at 07:53

## 2025-05-02 RX ADMIN — SEVELAMER HYDROCHLORIDE 1600 MG: 800 TABLET ORAL at 12:26

## 2025-05-02 RX ADMIN — METHOCARBAMOL 1000 MG: 500 TABLET ORAL at 14:03

## 2025-05-02 RX ADMIN — OXYCODONE HYDROCHLORIDE 10 MG: 10 TABLET ORAL at 17:30

## 2025-05-02 RX ADMIN — CALCIUM CARBONATE (ANTACID) CHEW TAB 500 MG 500 MG: 500 CHEW TAB at 12:28

## 2025-05-02 RX ADMIN — GABAPENTIN 100 MG: 100 CAPSULE ORAL at 07:52

## 2025-05-02 RX ADMIN — ACETAMINOPHEN 975 MG: 325 TABLET, FILM COATED ORAL at 14:04

## 2025-05-02 RX ADMIN — NIFEDIPINE 30 MG: 30 TABLET, FILM COATED, EXTENDED RELEASE ORAL at 17:28

## 2025-05-02 RX ADMIN — OXYCODONE HYDROCHLORIDE 5 MG: 5 TABLET ORAL at 03:51

## 2025-05-02 RX ADMIN — DOCUSATE SODIUM 100 MG: 100 CAPSULE, LIQUID FILLED ORAL at 17:28

## 2025-05-02 RX ADMIN — ACETAMINOPHEN 975 MG: 325 TABLET, FILM COATED ORAL at 21:14

## 2025-05-02 RX ADMIN — OXYCODONE HYDROCHLORIDE 10 MG: 10 TABLET ORAL at 23:06

## 2025-05-02 RX ADMIN — SENNOSIDES 17.2 MG: 8.6 TABLET, FILM COATED ORAL at 12:25

## 2025-05-02 RX ADMIN — ATORVASTATIN CALCIUM 80 MG: 80 TABLET, FILM COATED ORAL at 17:28

## 2025-05-02 NOTE — ASSESSMENT & PLAN NOTE
- Has chronic wounds affecting the left foot plantar surface, left foot 1st and 2nd digits  - Left Plantar Foot Wounds and Left toes:  Cleanse with NSS and pat dry. Paint all wounds with Betadine daily and cover plantar open wound with silicone bordered foam dressing. Nraen with T for Treatment and change every other day or PRN   > 4/22 : Wound care recs: Paint left foot 1st and 2nd digits with betadine dailyand covering plantar open wound with foam. Podiatry consulted   > 4/22: Podiatry did bedside debridement of left foot plantar ulcer. Continue with local wound care, no surgical intervention needed. SALVATORE ordered and vascular surgery consulted. Pt to maintain frequent LLE elevation, strict pressure and fracture reduction. WBAT to LLE  > 4/24: Pictures of LLE reviewed on chart. Wounds are stable with no signs of infection. XR L foot 4/22 with no evidence of OM. ABIs and toe pressures are within healing range. Pt to continue to follow up with wound care for further management

## 2025-05-02 NOTE — PLAN OF CARE
Problem: SAFETY ADULT  Goal: Patient will remain free of falls  Description: INTERVENTIONS:- Educate patient/family on patient safety including physical limitations- Instruct patient to call for assistance with activity - Consult OT/PT to assist with strengthening/mobility - Keep Call bell within reach- Keep bed low and locked with side rails adjusted as appropriate- Keep care items and personal belongings within reach- Initiate and maintain comfort rounds- Make Fall Risk Sign visible to staff- Offer Toileting every 2 Hours, in advance of need- Initiate/Maintain alarm- Obtain necessary fall risk management equipment: - Apply yellow socks and bracelet for high fall risk patients- Consider moving patient to room near nurses station  5/1/2025 2021 by Lyubov Read, RN  Outcome: Progressing  5/1/2025 2021 by Lyubov Read, RN  Outcome: Progressing

## 2025-05-02 NOTE — ASSESSMENT & PLAN NOTE
Volume mediated, and pain related as well  Blood pressure is stable  Low-sodium diet   Nifedipine 30 mg daily   Continue ultrafiltration with dialysis.  Continue current management

## 2025-05-02 NOTE — ASSESSMENT & PLAN NOTE
"- Reporting \"floater\" within the right eye with acute onset on 4/11/25 at mid-day while watching TV; describes a dark, \"spider\"-shaped floater that occupies a significant portion of the visual field, painless; has similar floaters in the left eye but smaller  - Ophthalmology consulted on [facility] admission by IM, per prior trauma surgery documentation, Dr. Chaudhry of ophthalmology was made personally aware of clinical situation on 4/11/25 prior to [facility] admission  > Ophthalmology consult 4/12: Exam c/w proliferative diabetic retinopathy with vitreous hemorrhage. Pt will follow up with ophtho immediately after discharge and for referral to a retina specialist  > 4/15- Pt thinks his R eye floater may be larger today, unable to clearly state or quantify symptoms. No other visual complaints of increased number of floaters, double/blurry vision, or bright flashes. Advised patient to inform us if symptoms change - will reach out to Ophthalmology accordingly  > 4/18- No changes noted in vision/floaters. His overall impaired vision continues to be a barrier for his ADLs/iADLs  > 4/20-24- Stable R eye floater    - Monitor for changes in vision  "

## 2025-05-02 NOTE — ASSESSMENT & PLAN NOTE
Continue local care  Being followed by WC  Last LEADS was 3/28/25 = 50-75% right prox polital and prox tibioperoneal trunk; >75% stenosis in prox popliteal artery on left  Was seen by VS in 12/2024 hospital stay and he declined any intervention at the time  Podiatry saw here on 4/22/25, s/p debridement = no surgical intervention needed at this time  Had new bleeding from foot wound 4/27/25. The bleeding then resolved. Podiatry was made aware.  Not sure this is from DM but PAD.  He has normal BSs and is not on any DM meds.  +pre-DM

## 2025-05-02 NOTE — CASE MANAGEMENT
Met with patient for discharge planning. We discussed transport options again. He stated that he would be going to his sister's when he's discharged who could provide transport to dialysis and he understands he may need to consider a taxi as discussed. I proposed comparing cost with other transport services such as Uber or [employer].     Received a VM from [employer] stating they could accommodate T, Th, Sat with a chair time between  AM, requested call back once DC date is identified.     Miriam BOYD denied due to staffing, awaiting decision from [facility]- requested determination again this afternoon. Sent additional referrals to 4 other companies.

## 2025-05-02 NOTE — PROGRESS NOTES
05/02/25 1430   Pain Assessment   Pain Assessment Tool 0-10   Pain Score 5   Pain Location/Orientation Orientation: Lower;Location: Back   Pain Onset/Description Onset: Ongoing;Descriptor: Sore   Patient's Stated Pain Goal No pain   Hospital Pain Intervention(s) Rest   Restrictions/Precautions   Precautions Cognitive;Fall Risk;Fluid restriction;Pain;Spinal precautions;Visual deficit   ROM Restrictions Yes  (spinal precautions)   Braces or Orthoses LSO   Cognition   Overall Cognitive Status Impaired   Arousal/Participation Alert;Cooperative   Attention Attends with cues to redirect   Orientation Level Oriented X4   Memory Decreased recall of precautions   Following Commands Follows one step commands without difficulty   Sit to Stand   Type of Assistance Needed Independent;Adaptive equipment   Comment RW   Sit to Stand CARE Score 6   Bed-Chair Transfer   Type of Assistance Needed Independent;Adaptive equipment   Comment RW   Chair/Bed-to-Chair Transfer CARE Score 6   Transfer Bed/Chair/Wheelchair   Adaptive Equipment Roller Walker   Car Transfer   Type of Assistance Needed Independent;Adaptive equipment   Comment RW   Car Transfer CARE Score 6   Walk 10 Feet   Type of Assistance Needed Independent;Adaptive equipment   Comment RW   Walk 10 Feet CARE Score 6   Walk 50 Feet with Two Turns   Type of Assistance Needed Independent;Adaptive equipment   Comment RW   Walk 50 Feet with Two Turns CARE Score 6   Walk 150 Feet   Type of Assistance Needed Independent;Adaptive equipment   Comment RW   Walk 150 Feet CARE Score 6   Walking 10 Feet on Uneven Surfaces   Type of Assistance Needed Supervision;Adaptive equipment   Comment RW over floor mat   Walking 10 Feet on Uneven Surfaces CARE Score 4   Ambulation   Primary Mode of Locomotion Prior to Admission Walk   Distance Walked (feet) 150 ft  (x3)   Assist Device Roller Walker   Gait Pattern Inconsistant Lisa;Decreased foot clearance;Forward Flexion;Wide  "GARO;Shuffle;Improper weight shift   Limitations Noted In Heel Strike;Posture;Safety;Speed;Strength;Swing   Provided Assistance with: Direction   Walk Assist Level Modified Independent   Does the patient walk? 2. Yes   Wheelchair mobility   Does the patient use a wheelchair? 0. No   Curb or Single Stair   Style negotiated Curb   Type of Assistance Needed Supervision;Adaptive equipment   Comment with RW over 6\" curb, no VC's required   1 Step (Curb) CARE Score 4   Picking Up Object   Type of Assistance Needed Independent;Adaptive equipment   Comment RW and reacher   Picking Up Object CARE Score 6   Toilet Transfer   Type of Assistance Needed Independent;Adaptive equipment   Comment RW   Toilet Transfer CARE Score 6   Therapeutic Interventions   Strengthening Seated LAQ, hip flex, ankle DF/PF, glute sets and hip ADD 2 x15 reps BLE.   Other Comments   Comments End of session pt was unable to find his urinal so a new one was provided. 2/2 pt's visual impairment he is unable to see it because the bed and frame are all white. Orange tape was added to top of urinal to help pt locate it I.   Assessment   Treatment Assessment Pt participated in skilled PT session after 2nd attempt. Pt cont to be limited by ongoing LBP but was more agreeable to session post medication. Pt was very fatigued this session requiring verbal and tactile cues to remain awake at times. Despite fatigue pt was still able to amb I with RW and perform curb step with S. 2/2 dialysis pt will have moments of increased fatigue. It was educated to pt that despite fatigue he should attempt these tasks as he may feel this way at home as well. Pt will cont POC as tolerated with cont focus on gait, balance, increased LE strengthening, increased stair and curb step management to decrease burden of care and decrease fall risk.   Problem List Decreased strength;Decreased endurance;Impaired balance;Decreased mobility;Orthopedic restrictions;Pain;Impaired vision "   Barriers to Discharge Decreased caregiver support   PT Barriers   Functional Limitation Car transfers;Stair negotiation;Standing;Transfers;Walking   Plan   Treatment/Interventions Functional transfer training;LE strengthening/ROM;Therapeutic exercise;Endurance training;Cognitive reorientation;Bed mobility   Progress Progressing toward goals   Discharge Recommendation   Equipment Recommended Walker   PT Therapy Minutes   PT Time In 1430   PT Time Out 1530   PT Total Time (minutes) 60   PT Mode of treatment - Individual (minutes) 60   PT Mode of treatment - Concurrent (minutes) 0   PT Mode of treatment - Group (minutes) 0   PT Mode of treatment - Co-treat (minutes) 0   PT Mode of Treatment - Total time(minutes) 60 minutes   PT Cumulative Minutes 1500   Therapy Time missed   Time missed? No

## 2025-05-02 NOTE — PROGRESS NOTES
Progress Note - PMR   Name: Naresh Carpio 64 y.o. male I MRN: [id number]  Unit/Bed#: -01 I Date of Admission: 4/11/2025   Date of Service: 5/2/2025 I Hospital Day: 21     Assessment & Plan  Traumatic retroperitoneal hematoma  - Sustained from mechanical fall several days prior to initial presentation on 4/2/25, presenting with progressively worsening low back pain. Trauma imaging revealed a large retroperitoneal hematoma with active extravasation and hemoglobin drop. Now s/p transfusion, Kcentra and ultimately IR embolization of the right deep circumflex iliac artery and superior gluteal artery with Dr. Garcia on 4/3/25. Unfortunately, on 4/6/25, his hemoglobin decreased and repeat CT showed a 5 mm inferior gluteal artery branch pseudoaneurysm and he is s/p IR pelvic angiogram on 4/7/25 with Dr. Hernandez, which demonstrated pseudoaneurysms at the distal branches of the left inferior gluteal artery which was embolized.   > 4/15: Hgb 7.2 today (from 7.6 yesterday). Back pain seems controlled. Will be receiving increased Epogen today per IM/Nephro  > Hgb 8.0 on 4/29 labs    - Continue to trend Hgb on CBC closely  - Consider repeat imaging if Hgb continues to downtrend or pain significantly worsens  - Contact IR if there are additional bleeding concerns given multiple prior interventions described above  - Local right hip/groin access site incision care  - Analgesia, see below  - PT and OT  Acute pain due to trauma  > Evaluated by APS on acute care  - Tylenol 975 mg q8h scheduled  - Gabapentin 100 mg three times weekly  - Methocarbamol 1000 mg q8h scheduled  - Lidocaine patches daily PRN  - Oxycodone 5 or 10 mg q4h PRN with 2.5mg Q8 for breakthrough pain, wean as possible  Impaired mobility and activities of daily living  - Rehabilitation medicine physician for daily monitoring of care, 24 hour availability for acute medical issues, medication management, and therapeutic and diagnostic assessments.  - 24 hour  rehabilitation nursing 7 days per week for: management/teaching of medications, bowel/bladder routine, skin care.  - PT, OT for 2-3 hours per day, 5 to 6 days per week; 15 hours per week  - Rehabilitation Psychology as needed for adjustment and coping  - CM for barriers to discharge, community resources, and family support  - Discharge planning following to help ensure a safe and efficient discharge  - 900/7 program due to HD status, pain, expected therapy tolerance  4/18- Ongoing dispo planning with patient/sister and CM. Staff expressed that patient is progressing well from a functional standpoint but continues to have difficulties with ADLs/iADLs due to impaired vision. Will continue to work towards goals for safe dischare  4/22- DC date now pending outpatient HD setup    L2 vertebral fracture (HCC)  > Sustained from a mechanical fall on 3/24/25, found to have an acute, obliquely oriented fracture of the L2 vertebral body with distraction of the dominant fracture fragment and involvement of the anterior and posterior cortices and superior endplate  > At that time, managed non-operatively with LSO bracing, though it does not appear orthopedic spine surgery or neurosurgery evaluated at the time of injury    - Maintain LSO brace with lumbar spinal precautions   - Should follow-up with spine surgery outpatient  Closed fracture of proximal end of left humerus with routine healing  - Sustained from fall in early February, per CT of the LUE on 2/9/25, there was a minimally displaced fracture of the anterior facet of the greater tuberosity of the left humerus, minimally apparent on repeat x-ray of the shoulder on 3/24/25  - He was evaluated by orthopedic surgery at the time of the initial injury and was recommended to maintain NWB through the LUE with a sling  - There is no further orthopedic evaluation documentation and it does not appear that further follow-up occurred.  - Clinically, he is not describing ongoing  "significant left arm or shoulder pain and has been weight-bearing through the LUE  > 4/14: Repeat humerus X-ray done yesterday shows no change in fracture alignment. Ortho consulted.  >4/15: Ortho c/s: May WBAT to LUE. PT/OT to increase strength and ROM. F/u with Dr. Cisneros in 4 weeks    - Monitor  - PT/OT  Insomnia  Patient reports he has an overall a poor sleep hygiene: he sleeps at 1-2am nightly and watches TV up until bed.  Advised patient to attempt to regulate sleep/wake cycle while here to better participate in therapy. Attempt to try to sleep earlier around midnight  >4/15: reviewed sleep log: Pt went to sleep around midnight and slept for 6 hours.  > 4/17- Poor sleep last night: was awake every other hour. Requiring nap during the day. Can increase melatonin tomorrow if this persists  Pt endorses moving from bed to chair multiple times throughout the night which is his baseline at home too    - Continue sleep log  - Continue melatonin 6mg nightly  At risk for constipation   4/15- Adjusted bowel medications: Colace 100mg BID, Senna 17.2mg with lunch and PRN Miralax/Suppos  4/17- No BM since 4/13. Lactulose this evening if no BM after lunch. Can consider Relistor if no success for opioid-inducted constipation  4/18- small hard BMs x2 yesterday after lactulose and suppository. Continue bowel regimen  >BM 4/22 but required Relistor and suppository/lactulose  > Last BM 4/29    - Will follow BMs and adjust bowel regimen to target soft, formed stools q1-2 days, per pt's regular schedule.  Anemia due to chronic kidney disease, on chronic dialysis (HCC)  > 4/14: Hgb 7.6  > 4/15: Hgb 7.2 - Discussed with IM who is coordinating with Nephrology; they will increase his Epoetin jessica  > 4/17: Hgb 7.7  > 4/29: Hgb 8.0    - Trend Hgb on CBC  - Epoetin jessica with dialysis  - Transfusions per IM, appreciate their recommendations  Visual disturbance  - Reporting \"floater\" within the right eye with acute onset on 4/11/25 at " "mid-day while watching TV; describes a dark, \"spider\"-shaped floater that occupies a significant portion of the visual field, painless; has similar floaters in the left eye but smaller  - Ophthalmology consulted on [facility] admission by IM, per prior trauma surgery documentation, Dr. Chaudhry of ophthalmology was made personally aware of clinical situation on 4/11/25 prior to [facility] admission  > Ophthalmology consult 4/12: Exam c/w proliferative diabetic retinopathy with vitreous hemorrhage. Pt will follow up with ophtho immediately after discharge and for referral to a retina specialist  > 4/15- Pt thinks his R eye floater may be larger today, unable to clearly state or quantify symptoms. No other visual complaints of increased number of floaters, double/blurry vision, or bright flashes. Advised patient to inform us if symptoms change - will reach out to Ophthalmology accordingly  > 4/18- No changes noted in vision/floaters. His overall impaired vision continues to be a barrier for his ADLs/iADLs  > 4/20-24- Stable R eye floater    - Monitor for changes in vision  Diabetic ulcer of left midfoot associated with type 2 diabetes mellitus, limited to breakdown of skin (HCC)  - Has chronic wounds affecting the left foot plantar surface, left foot 1st and 2nd digits  - Left Plantar Foot Wounds and Left toes:  Cleanse with NSS and pat dry. Paint all wounds with Betadine daily and cover plantar open wound with silicone bordered foam dressing. Naren with T for Treatment and change every other day or PRN   > 4/22 : Wound care recs: Paint left foot 1st and 2nd digits with betadine dailyand covering plantar open wound with foam. Podiatry consulted   > 4/22: Podiatry did bedside debridement of left foot plantar ulcer. Continue with local wound care, no surgical intervention needed. SALVATORE ordered and vascular surgery consulted. Pt to maintain frequent LLE elevation, strict pressure and fracture reduction. WBAT to LLE  > 4/24: Pictures of " LLE reviewed on chart. Wounds are stable with no signs of infection. XR L foot 4/22 with no evidence of OM. ABIs and toe pressures are within healing range. Pt to continue to follow up with wound care for further management  PAD (peripheral artery disease) (HCC)  > Non invasive arterial studies from 3/28:  Diffuse disease noted throughout the femoral-popliteal arteries. Evidence of 50-75% stenosis in the proximal popliteal artery. Evidence of 50-75% stenosis at the proximal tibio-peroneal trunk.   LLE: Diffuse disease noted throughout the femoral-popliteal arteries. Evidence of >75% stenosis in the proximal popliteal artery.  Tech Note: Patient refused SALVATORE.  SALVATORE 4/22: Left lower limb SALVATORE noncompressible, metatarsal pressure 155 mmHg, great toe pressure 72 mmHg. Right lower limb noncompressible SALVATORE, metatarsal pressure 158 mmHg, second toe pressure 101 mmHg   Vascular surgery consult pending  Hyponatremia  > 4/14: Na 129 - A&O x3, pt mentating appropriately  > 4/15: Na 125 - Discussed with IM who is coordinating with Nephrology; they will manage Na during HD   > Sodium levels continue to fluctuate from 125-131. Pt remains A&O x3. Nephrology will continue to manage electrolyte deficiencies    - Trend on BMP  - Electrolyte management per nephrology  - Continue HD in setting of ESRD  ESRD (end stage renal disease) (HCC)  - Continue HD  - Nephrology consulted and following, appreciate their recommendations  >4/22: new outpatient HD center needed close to his home after discharge. CM to follow up. DC pushed  Paroxysmal atrial fibrillation (HCC)  - Currently rate controlled  - Embolic stroke risk reduction with apixaban 5 mg BID    >4/22- Refusing it this morning. Extensive conversation had with patient to discuss the importance of compliance given his high stroke risk.   > 4/24- now compliant with Eliquis  Chronic diastolic (congestive) heart failure (HCC)  Wt Readings from Last 3 Encounters:   05/01/25 94.5 kg (208 lb  6.4 oz)   04/11/25 98.5 kg (217 lb 2.5 oz)   03/24/25 107 kg (234 lb 12.6 oz)     - Most recent TTE from 10/9/24 with EF 60-65%, grade 1 diastolic dysfunction  - Volume management with HD per nephrology  - Monitor weights  - Appreciate medicine and nephrology recommendations and management  Primary hypertension  - Nifedipine  - Appreciate IM and nephrology management  Gout  - Allopurinol for flare prophylaxis  Hyperphosphatemia  > 4/15: Phos 4.6  - Management per nephrology  - Continue phosphate binders  Wound of skin  - Has right anterior pre-tibial scab, wash gently daily, leave open to air  - Wound care RN consulted and following  At high risk for skin breakdown  > 4/21- New sacral pressure injury noted by staff. Pictures reviewed on chart. Patient primarily sleeps in recliner (both here and at home). Discussed with therapy and pt will use a cushion that offers sacral relief on recliner. Will continue to monitor  >4/22- Wound care recs:  Cleanse wound with NSS, pat dry. Apply calcium alginate with silver to wound bed and cover with foam. Change every other day and prn. Cushion will not be covered per therapy for home - advised patient to find one off [employer] to continue offloading     - Moisturize skin daily with skin nourishing cream  - Ehob cushion in chair when out of bed.  - Preventative hydraguard to bilateral heels BID and PRN.   - Calazime paste to sacrum/buttocks BID and PRN  - Monitor clinically for breakdown, frequent turns  - Wound care RN consulted and following  At risk for venous thromboembolism (VTE)  - Apixaban, see above  Secondary hyperparathyroidism (HCC)      Subjective   64-year-old male with history of chronic anemia, atrial fibrillation, diabetes, end-stage renal disease on hemodialysis presenting on 4/2 with progressively worsening back pain after fall found of left sided retroperitoneal hematoma with active extravasation status post Kcentra, stabilization with transfusions and multiple IR  embolizations. Course complicated by acute on chronic pain     Chief Complaint: f/u ambulatory dysfunction    Interval: Patient seen and evaluated in room without any acute issues overnight.  Tolerated dialysis yesterday.  For potential discharge pending bed availability at [facility].  At this time met with team as well as myself and endorses being able to stay with his sister for short period of time and can get some help with rides to and from dialysis will eventually need to transition on his own.  There is a financial obligation for having to hire a ride to and from dialysis which was discussed with him and he is understanding of that.  Otherwise he states that he would like his eyes to be fixed but he knows after multiple conversations including with ophthalmology that needs to be set up as an outpatient.  Additionally he has periodic pain and for the most part has been under control and is more acute on chronic low back pain.  No fever, chills, nausea vomiting or cough or shortness of breath.  He is tired at times says he does not sleep well overnight.  Concerned about his phone    Objective :  Temp:  [97.5 °F (36.4 °C)-98.3 °F (36.8 °C)] 98.2 °F (36.8 °C)  HR:  [62-84] 63  BP: (108-169)/(58-84) 152/67  Resp:  [16-20] 16  SpO2:  [94 %-98 %] 97 %  O2 Device: None (Room air)    Functional Update:  Physical Therapy Occupational Therapy Speech Therapy   Weight Bearing Status: Full Weight Bearing  Transfers: Independent  Bed Mobility: Independent (sleeps in recliner)  Amulation Distance (ft): 350 feet  Ambulation: Supervision, Independent (DS-Judi)  Assistive Device for Ambulation: Roller Walker  Number of Stairs: 8  Assistive Device for Stairs: Lehft Hand Rail  Stair Assistance: Supervision  Discharge Recommendations: Home with:  DC Home with:: Family Support, Home Physical Therapy   Eating: Independent  Grooming: Independent  Bathing:  (setup)  Bathing:  (setup)  Upper Body Dressing: Independent  Lower Body  Dressing: Supervision  Toileting: Independent  Tub/Shower Transfer: Independent  Toilet Transfer: Independent  Cognition: Exceptions to WNL  Cognition: Decreased Memory, Decreased Executive Functions, Decreased Attention, Decreased Safety, Behavioral Considerations  Orientation: Place, Person, Time, Situation               Physical Exam  Vitals reviewed.   Constitutional:       General: He is not in acute distress.  HENT:      Head: Normocephalic and atraumatic.      Right Ear: External ear normal.      Left Ear: External ear normal.      Nose: Nose normal. No rhinorrhea.      Mouth/Throat:      Mouth: Mucous membranes are moist.      Pharynx: Oropharynx is clear.   Eyes:      General: No scleral icterus.  Cardiovascular:      Rate and Rhythm: Normal rate.      Pulses: Normal pulses.   Pulmonary:      Effort: Pulmonary effort is normal. No respiratory distress.   Abdominal:      General: There is no distension.      Palpations: Abdomen is soft.   Musculoskeletal:      Right lower leg: No edema.      Left lower leg: No edema.   Skin:     General: Skin is warm and dry.   Neurological:      Mental Status: He is alert and oriented to person, place, and time.      Sensory: Sensory deficit present.   Psychiatric:         Mood and Affect: Mood normal.         Behavior: Behavior normal.           Scheduled Meds:  Current Facility-Administered Medications   Medication Dose Route Frequency Provider Last Rate    acetaminophen  975 mg Oral Q8H [facility] Damian Cerda MD      allopurinol  100 mg Oral Daily Damian Cerda MD      aluminum-magnesium hydroxide-simethicone  30 mL Oral Q4H PRN GRANT Church      apixaban  5 mg Oral BID Damian Cerda MD      atorvastatin  80 mg Oral Daily With Dinner Damian Cerda MD      bisacodyl  10 mg Rectal Daily PRN Michelle Landa,       calcium carbonate  500 mg Oral Daily PRN GRANT Church      docusate sodium  100 mg Oral BID Michelle LAMONTE Landa, DO      epoetin jessica  8,000 Units  Intravenous After Dialysis German Linton MD      gabapentin  100 mg Oral Once per day on Monday Wednesday Friday Damian Cerda MD      lidocaine  1 patch Topical Daily PRN Damian Cerda MD      melatonin  6 mg Oral HS Jayden Rowland MD      methocarbamol  1,000 mg Oral Q8H [facility] Jayden Rowland MD      naloxone  0.04 mg Intravenous Q1MIN PRN Damian Cerda MD      NIFEdipine  30 mg Oral After Dinner Damian Cerda MD      ondansetron  4 mg Oral Q6H PRN Gino Austin PA-C      oxyCODONE  5 mg Oral Q4H PRN Damian Cerda MD      Or    oxyCODONE  10 mg Oral Q4H PRN Damian Cerda MD      oxyCODONE  2.5 mg Oral Q8H PRN Jayden Rowland MD      polyethylene glycol  17 g Oral Daily PRN Michelle Landa, DO      senna  2 tablet Oral Daily With Lunch Michelle Landa, DO      sevelamer  1,600 mg Oral TID With Meals Damian Cerda MD      white petrolatum-mineral oil   Topical TID PRN Michelle Landa, DO           Lab Results: I have reviewed the following results:  Results from last 7 days   Lab Units 04/29/25  1350   HEMOGLOBIN g/dL 8.0*   HEMATOCRIT % 26.3*   WBC Thousand/uL 7.55   PLATELETS Thousands/uL 224     Results from last 7 days   Lab Units 04/29/25  1350   BUN mg/dL 60*   SODIUM mmol/L 132*   POTASSIUM mmol/L 5.5*   CHLORIDE mmol/L 96   CREATININE mg/dL 8.38*                Santos Guan DO  Physical Medicine and Rehabilitation  [facility]    I have spent a total time of 35 minutes in caring for this patient on the day of the visit/encounter including Counseling / Coordination of care, Documenting in the medical record, and Communicating with other healthcare professionals .

## 2025-05-02 NOTE — PROGRESS NOTES
"Progress Note - Hospitalist   Name: Naresh Carpio 64 y.o. male I MRN: [id number]  Unit/Bed#: -01 I Date of Admission: 4/11/2025   Date of Service: 5/2/2025 I Hospital Day: 21    Assessment & Plan  Traumatic retroperitoneal hematoma  Admitted to the trauma service after a fall with L2 vertebral body and retroperitoneal hematoma.  S/p IR embolization of distal right deep circumflex iliac artery/distal right superior gluteal artery both of which supplied an area of active extravasation and then pseudoaneurysms present at distal branches of the left inferior gluteal artery. The artery was embolized using coils and gelfoam.   He was type and screened 4/17/25 in case needed blood during HD on that day.  Hgb 4/22 improved to 8.4 and 4/29 was 8.0  Anemia due to chronic kidney disease, on chronic dialysis ([facility])  Had 5 U PRBCs during hospital stay  Hemoglobin has been running mostly 7.3-7.4.    On 4/14/25 was 7.6; on 4/15/24 was 7.2 ---> Renal increased his Epogen in light of his hemoglobin  CBC Tuesday in HD 4/29/25 = 8.0 and stable  ESRD (end stage renal disease) ([facility])  Continue Sevelamer TID with meals  Renal following  HD T-TH-Sat while here in rehab  Paroxysmal atrial fibrillation ([facility])  On no rate control medications  Examines RRR  Anticoagulation with Eliquis as at home but he doesn't want to take it because he is worried it will make his right eye vision worse.  I d/w on call Optho Dr Diaz = \"The risk is not quantified in the literature. Dr edmond's note suggests a diagnosis of proliferative diabetic retinopathy and bilateral and recurrent vitreous hemorrhage. So he is at risk for - and has had hemorrhage - whether or not he is on eliquis (assuming dr edmond is correct).     I would always recommend life and death are more important so to do what you recommend re his systemic condition, and then to follow up with dr edmond as he recommended so a real exam can be done in the clinic\".   Explained all this " "to him and he understands  He had been taking the Eliquis again and now refusing it because he is afraid of his foot bleeding.  He says \"no point in discussing it\"; I d/w him but again refuses to take it.  He is fully aware that he may have a stroke if he does not take the Eliquis  Chronic diastolic (congestive) heart failure (HCC)  Some LE edema = chronic/stable  Renal diet  stable  Volume management via HD  Primary hypertension  Continue Procardia XL 30 mg qd  Tx per renal  Stable; no changes today 5/2/25  Visual disturbance  Reported a right eye \"floater\" without associated pain earlier in his hospitalization which has resolved but continued to have blurry central vision.  Ophthalmology saw here in [facility] = visual acuity without correction is 20/400 OU. Dx: Proliferative diabetic retinopathy with vitreous hemorrhage OU as well as cataracts.   Will need outpatient follow up immediately after discharge from rehab  Diabetic ulcer of left midfoot associated with type 2 diabetes mellitus, limited to breakdown of skin (HCC)  Continue local care  Being followed by WC  Last LEADS was 3/28/25 = 50-75% right prox polital and prox tibioperoneal trunk; >75% stenosis in prox popliteal artery on left  Was seen by VS in 12/2024 hospital stay and he declined any intervention at the time  Podiatry saw here on 4/22/25, s/p debridement = no surgical intervention needed at this time  Had new bleeding from foot wound 4/27/25. The bleeding then resolved. Podiatry was made aware.  Not sure this is from DM but PAD.  He has normal BSs and is not on any DM meds.  +pre-DM  Hyponatremia  Renal following  Continue fluid restriction 1200 cc per 24 hours  Closed fracture of proximal end of left humerus with routine healing  Occurred from a fall in February and eval'd at the time of the injury  Was to continue NWB/sling and followup with Ortho but there was no followup done  Xray 4/13/25 = \"Unchanged alignment of the chronic greater tuberosity " "avulsion fracture\".   Ortho saw here on 4/14 = WBAT; see Dr Cisneros 4 weeks @ 5/14/25  Gout  Continue Allopurinol  L2 vertebral fracture (HCC)  CT CAP 4/2: Fracture of L2 vertebral body extending to the superior endplate  Continue LSO brace  Insomnia  Patient reports not sleeping well at night because he has trouble getting comfortable. He has been trying to sleep in the recliner   PAD (peripheral artery disease) ([facility])  Vascular surgery saw per Podiatry's recommendation = they will see in the office.  They advised start ASA but after the d/w the PA in relation to his vitreous hemorrhages, can hold off on that and d/w him in the office.  This will give him time to see the Retina specialist as OP as about getting tx  Type 2 diabetes mellitus with foot ulcer, without long-term current use of insulin ([facility])  HgbA1C was 5.7 on 2/9/25  Not on medications at here/home   FBSs have been normal  On Regular diet    The above assessment and plan was reviewed and updated as determined by my evaluation of the patient on 5/2/2025.    History of Present Illness   Patient seen and examined. Patients overnight issues or events were reviewed with nursing staff. New or overnight issues include the following:   No new or overnight issues.  Offers no complaints    Review of Systems   All other systems reviewed and are negative.      Objective :  Temp:  [97.5 °F (36.4 °C)-98.3 °F (36.8 °C)] 98.2 °F (36.8 °C)  HR:  [62-84] 63  BP: (108-169)/(58-84) 152/67  Resp:  [16-20] 16  SpO2:  [94 %-98 %] 97 %  O2 Device: None (Room air)    Invasive Devices       Hemodialysis Catheter  Duration             HD Permanent Double Catheter 611 days                    Physical Exam  General Appearance: no distress, non toxic appearing  HEENT: PERRLA, conjuctiva normal; oropharynx clear; mucous membranes moist   Neck:  Supple, normal ROM  Lungs: CTA, normal respiratory effort, no retractions, expiratory effort normal  CV: regular rate and rhythm; no " rubs/murmurs/gallops, PMI normal   ABD: soft; ND/NT; +BS  EXT: no edema  Skin: normal turgor, normal texture  Psych: affect normal, mood normal  Neuro: AAO        The above physical exam was reviewed and updated as determined by my evaluation of the patient on 5/2/2025.      Lab Results: I have reviewed the following results:  Results from last 7 days   Lab Units 04/29/25  1350   WBC Thousand/uL 7.55   HEMOGLOBIN g/dL 8.0*   HEMATOCRIT % 26.3*   PLATELETS Thousands/uL 224     Results from last 7 days   Lab Units 04/29/25  1350   SODIUM mmol/L 132*   POTASSIUM mmol/L 5.5*   CHLORIDE mmol/L 96   CO2 mmol/L 23   BUN mg/dL 60*   CREATININE mg/dL 8.38*   CALCIUM mg/dL 9.2                   Imaging Results Review: No pertinent imaging studies reviewed.  Other Study Results Review: No additional pertinent studies reviewed.    Review of Scheduled Meds: Medications  reviewed and reconciled as needed  Current Facility-Administered Medications   Medication Dose Route Frequency Provider Last Rate    acetaminophen  975 mg Oral Q8H [facility] Damian Cerda MD      allopurinol  100 mg Oral Daily Damian Cerda MD      aluminum-magnesium hydroxide-simethicone  30 mL Oral Q4H PRN GRANT Church      apixaban  5 mg Oral BID Damian Cerda MD      atorvastatin  80 mg Oral Daily With Dinner Damian Cerda MD      bisacodyl  10 mg Rectal Daily PRN Michelle Landa, DO      calcium carbonate  500 mg Oral Daily PRN GRANT Church      docusate sodium  100 mg Oral BID Michelle T Cordell, DO      epoetin jessica  8,000 Units Intravenous After Dialysis German Linton MD      gabapentin  100 mg Oral Once per day on Monday Wednesday Friday Damian Cerda MD      lidocaine  1 patch Topical Daily PRN Damian Cerda MD      melatonin  6 mg Oral HS Jayden Rowland MD      methocarbamol  1,000 mg Oral Q8H [facility] Jayden Rowland MD      naloxone  0.04 mg Intravenous Q1MIN PRN Damian Cerda MD      NIFEdipine  30 mg Oral After Dinner Damian Cerda MD       ondansetron  4 mg Oral Q6H PRN Gino Austin PA-C      oxyCODONE  5 mg Oral Q4H PRN Damian Cerda MD      Or    oxyCODONE  10 mg Oral Q4H PRN Damian Cerda MD      oxyCODONE  2.5 mg Oral Q8H PRN Jayden Rowland MD      polyethylene glycol  17 g Oral Daily PRN Michelle Ngoel, DO      senna  2 tablet Oral Daily With Lunch Michelle Landa, DO      sevelamer  1,600 mg Oral TID With Meals Damian Cerda MD      white petrolatum-mineral oil   Topical TID PRN Michelle Landa, DO         VTE Pharmacologic Prophylaxis: Eliquis  Code Status: Level 1 - Full Code  Current Length of Stay: 21 day(s)    Administrative Statements     ** Please Note:  voice to text software may have been used in the creation of this document. Although proof errors in transcription or interpretation are a potential of such software**

## 2025-05-02 NOTE — ASSESSMENT & PLAN NOTE
Had 5 U PRBCs during hospital stay  Hemoglobin has been running mostly 7.3-7.4.    On 4/14/25 was 7.6; on 4/15/24 was 7.2 ---> Renal increased his Epogen in light of his hemoglobin  CBC Tuesday in HD 4/29/25 = 8.0 and stable   5-Fu Counseling: 5-Fluorouracil Counseling:  I discussed with the patient the risks of 5-fluorouracil including but not limited to erythema, scaling, itching, weeping, crusting, and pain.

## 2025-05-02 NOTE — ASSESSMENT & PLAN NOTE
HgbA1C was 5.7 on 2/9/25  Not on medications at here/home   FBSs have been normal  On Regular diet

## 2025-05-02 NOTE — PROGRESS NOTES
Progress Note - Nephrology   Name: Naresh Carpio 64 y.o. male I MRN: [id number]  Unit/Bed#: -01 I Date of Admission: 4/11/2025   Date of Service: 5/2/2025 I Hospital Day: 21     Assessment & Plan  ESRD (end stage renal disease) ([facility])  #ESRD on HD TTS:  Dialysis unit/days: [facility]  Access: PermCath  Educated on compliance with full treatment.  He has been doing better in regards to staying on treatment as long as possible.  Last hemodialysis was yesterday May 1st  Underwent 195 minutes of hemodialysis with 1.3 L removed  Plan for next hemodialysis tomorrow May 3rd  Primary hypertension  Volume mediated, and pain related as well  Blood pressure is stable  Low-sodium diet   Nifedipine 30 mg daily   Continue ultrafiltration with dialysis.  Continue current management  Anemia due to chronic kidney disease, on chronic dialysis ([facility])  Continue Epogen 8000 units with dialysis  Last hemoglobin was 8    Traumatic retroperitoneal hematoma  S/p IR embolization  Hemoglobin remained stable  Secondary hyperparathyroidism ([facility])  Sevelamer with meals 3 times daily   Continue renal diet  Continue current management    PAD (peripheral artery disease) ([facility])      I have reviewed the nephrology recommendations including assessment and plan, with patient, and we are in agreement with renal plan including the information outlined above. Nephrology service will follow.    Subjective   Brief History of Admission - 64-year-old male with a history of end-stage renal disease currently in the rehabilitation unit after being treated for traumatic retroperitoneal hemorrhage     No overnight events.  Underwent hemodialysis yesterday.    Objective :  Temp:  [97.5 °F (36.4 °C)-98.3 °F (36.8 °C)] 98.2 °F (36.8 °C)  HR:  [62-84] 63  BP: (108-169)/(58-84) 152/67  Resp:  [16-20] 16  SpO2:  [94 %-98 %] 97 %  O2 Device: None (Room air)    Current Weight: Weight - Scale: 94.5 kg (208 lb 6.4 oz)  First Weight: Weight - Scale: 98.9 kg (218 lb)  I/O          04/30 0701  05/01 0700 05/01 0701  05/02 0700 05/02 0701  05/03 0700    P.O. 780 200     I.V. (mL/kg)  440 (4.7)     Total Intake(mL/kg) 780 (8.3) 640 (6.8)     Urine (mL/kg/hr)       Other  1861     Stool       Total Output  1861     Net +780 -1221                  Physical Exam  Vitals and nursing note reviewed.   Constitutional:       General: He is not in acute distress.     Appearance: He is well-developed. He is not diaphoretic.   HENT:      Head: Normocephalic and atraumatic.   Eyes:      General: No scleral icterus.     Pupils: Pupils are equal, round, and reactive to light.   Cardiovascular:      Rate and Rhythm: Normal rate and regular rhythm.      Heart sounds: Normal heart sounds. No murmur heard.     No friction rub. No gallop.   Pulmonary:      Effort: Pulmonary effort is normal. No respiratory distress.      Breath sounds: Normal breath sounds. No wheezing or rales.   Chest:      Chest wall: No tenderness.   Abdominal:      General: Bowel sounds are normal. There is no distension.      Palpations: Abdomen is soft.      Tenderness: There is no abdominal tenderness. There is no rebound.   Musculoskeletal:         General: Normal range of motion.      Cervical back: Normal range of motion and neck supple.   Skin:     General: Skin is dry.      Coloration: Skin is pale.      Findings: No rash.   Neurological:      Mental Status: He is alert and oriented to person, place, and time.         Medications:    Current Facility-Administered Medications:     acetaminophen (TYLENOL) tablet 975 mg, 975 mg, Oral, Q8H KEMAL, Damian Cerda MD, 975 mg at 05/02/25 0515    allopurinol (ZYLOPRIM) tablet 100 mg, 100 mg, Oral, Daily, Damian Cerda MD, 100 mg at 05/02/25 0752    aluminum-magnesium hydroxide-simethicone (MAALOX) oral suspension 30 mL, 30 mL, Oral, Q4H PRN, GRANT Church, 30 mL at 04/28/25 2121    apixaban (ELIQUIS) tablet 5 mg, 5 mg, Oral, BID, Damian Cerda MD, 5 mg at 04/27/25 1700     atorvastatin (LIPITOR) tablet 80 mg, 80 mg, Oral, Daily With Dinner, Damian Cerda MD, 80 mg at 05/01/25 1643    bisacodyl (DULCOLAX) rectal suppository 10 mg, 10 mg, Rectal, Daily PRN, Michelle T Landa, DO, 10 mg at 04/18/25 0000    calcium carbonate (TUMS) chewable tablet 500 mg, 500 mg, Oral, Daily PRN, GRANT Church, 500 mg at 05/01/25 1643    docusate sodium (COLACE) capsule 100 mg, 100 mg, Oral, BID, Michelle T Landa, DO, 100 mg at 05/02/25 0752    epoetin jessica (EPOGEN,PROCRIT) injection 8,000 Units, 8,000 Units, Intravenous, After Dialysis, German Linton MD, 8,000 Units at 05/01/25 1628    gabapentin (NEURONTIN) capsule 100 mg, 100 mg, Oral, Once per day on Monday Wednesday Friday, Damian Cerda MD, 100 mg at 05/02/25 0752    lidocaine (LIDODERM) 5 % patch 1 patch, 1 patch, Topical, Daily PRN, Damian Cerda MD, 1 patch at 04/18/25 2135    melatonin tablet 6 mg, 6 mg, Oral, HS, Jayden Rowland MD, 6 mg at 05/01/25 2102    methocarbamol (ROBAXIN) tablet 1,000 mg, 1,000 mg, Oral, Q8H KEMAL, Jayden Rowland MD, 1,000 mg at 05/02/25 0515    naloxone (NARCAN) 0.04 mg/mL syringe 0.04 mg, 0.04 mg, Intravenous, Q1MIN PRN, Damian Cerda MD    NIFEdipine (PROCARDIA XL) 24 hr tablet 30 mg, 30 mg, Oral, After Dinner, Damian Cerda MD, 30 mg at 05/01/25 1702    ondansetron (ZOFRAN-ODT) dispersible tablet 4 mg, 4 mg, Oral, Q6H PRN, Gino Austin PA-C, 4 mg at 04/20/25 0023    oxyCODONE (ROXICODONE) IR tablet 5 mg, 5 mg, Oral, Q4H PRN, 5 mg at 05/02/25 0351 **OR** oxyCODONE (ROXICODONE) immediate release tablet 10 mg, 10 mg, Oral, Q4H PRN, Damian Cerda MD, 10 mg at 05/02/25 0754    oxyCODONE (ROXICODONE) split tablet 2.5 mg, 2.5 mg, Oral, Q8H PRN, Jayden Rowland MD, 2.5 mg at 05/01/25 1433    polyethylene glycol (MIRALAX) packet 17 g, 17 g, Oral, Daily PRN, Michelle Landa DO, 17 g at 04/26/25 1259    senna (SENOKOT) tablet 17.2 mg, 2 tablet, Oral, Daily With Lunch, Michelle Landa DO, 17.2 mg at 04/30/25  "1138    sevelamer (RENAGEL) tablet 1,600 mg, 1,600 mg, Oral, TID With Meals, Damian Cerda MD, 1,600 mg at 05/02/25 1942    white petrolatum-mineral oil (EUCERIN,HYDROCERIN) cream, , Topical, TID PRN, Michelle Landa DO      Lab Results: I have reviewed the following results:  Results from last 7 days   Lab Units 04/29/25  1350   WBC Thousand/uL 7.55   HEMOGLOBIN g/dL 8.0*   HEMATOCRIT % 26.3*   PLATELETS Thousands/uL 224   POTASSIUM mmol/L 5.5*   CHLORIDE mmol/L 96   CO2 mmol/L 23   BUN mg/dL 60*   CREATININE mg/dL 8.38*   CALCIUM mg/dL 9.2       Administrative Statements   I have spent a total time of 10 minutes in caring for this patient on the day of the visit/encounter including Counseling / Coordination of care, Documenting in the medical record, Reviewing/placing orders in the medical record (including tests, medications, and/or procedures), Obtaining or reviewing history  , and Communicating with other healthcare professionals .  Portions of the record may have been created with voice recognition software. Occasional wrong word or \"sound a like\" substitutions may have occurred due to the inherent limitations of voice recognition software. Read the chart carefully and recognize, using context, where substitutions have occurred.If you have any questions, please contact the dictating provider.  "

## 2025-05-02 NOTE — PROGRESS NOTES
"OT daily tx note     05/02/25 0870   Pain Assessment   Pain Assessment Tool 0-10   Pain Score 4   Pain Location/Orientation Location: Back   Hospital Pain Intervention(s) Emotional support;Rest   Restrictions/Precautions   Precautions Cognitive;Fall Risk;Fluid restriction;Visual deficit;Pain;Spinal precautions   ROM Restrictions Yes  (lumbar spinal prec)   Braces or Orthoses LSO   Lifestyle   Autonomy \"my sister needs to know I need a phone before I can think about leaving\"   Eating   Type of Assistance Needed Independent   Physical Assistance Level No physical assistance   Comment seated in recliner   Eating CARE Score 6   Shower/Bathe Self   Comment pt refused SB stating he had one yesterday and did not see the need to complete again. OT offered to only wash body parts that need more hygenic attention (jose de jesus, butt, armpits) but again refused stating his pain is limiting his tolerance to do anything today   Reason if not Attempted Refused to perform   Shower/Bathe Self CARE Score 7   Upper Body Dressing   Type of Assistance Needed Independent   Physical Assistance Level No physical assistance   Comment pt req to change shirt and can manage all UB garments including LSO brace   Upper Body Dressing CARE Score 6   Lower Body Dressing   Reason if not Attempted Refused to perform   Lower Body Dressing CARE Score 7   Putting On/Taking Off Footwear   Reason if not Attempted Refused to perform   Putting On/Taking Off Footwear CARE Score 7   Sit to Stand   Type of Assistance Needed Independent   Physical Assistance Level No physical assistance   Comment w/ RW   Sit to Stand CARE Score 6   Bed-Chair Transfer   Type of Assistance Needed Independent   Physical Assistance Level No physical assistance   Comment w/ RW   Chair/Bed-to-Chair Transfer CARE Score 6   Exercise Tools   UE Ergometer Pt engaged in UB strengthening using UE ergometer for 5 min prograde and 5 min retrograde with Good tolerance. Pt educated on energy " conservation techniques and utilized rest breaks in btwn sets. Pt benefits from activity to improve UB strengthening, activity tolerance, and endurance to facilitate independence w/ ADL/IADLs.   Cognition   Overall Cognitive Status Impaired   Arousal/Participation Alert;Uncooperative   Attention Attends with cues to redirect   Orientation Level Oriented X4   Memory Decreased recall of precautions   Following Commands Follows one step commands without difficulty   Additional Activities   Additional Activities Other (Comment)   Additional Activities Comments Pt cont to be concerned about going now since his phone has been missing the last few days. Pt reporting he needs to call his sister to get him a new phone. OT explained that he can use the landline phone in his room to call out by pressing 91 + phone #. Pt has difficulty seeing # on phone but OT explained therapy/staff can assist w/ dialing and then he can use phone. OT able to call sisterDiana and pt explained that his personal phone cannot be located and geetha need a new phone prior to DC. DC is still TBD but pt explained to sister if she can get him one by the weekend to at least practice using before he leaves. Long discussion btwn pt and sister and pt report that she does not know when she can get the phone but requesting pt to call her today/tmrw to f/u as she was busy at the grocery store. Pls f/u over weekend regarding phone update.   Activity Tolerance   Activity Tolerance Patient limited by pain   Assessment   Treatment Assessment Pt engaged in skilled OT session with focus on ADL Retraining , LB Dressing, UB dressing, Functional Transfers, Standing tolerance, Standing balance , Gross motor strengthening , Energy conservation training/education, healthy coping education, Leisure and social pursuits, and community re-integration. Pt is limited by weakness, impaired balance, decreased endurance, increased fall risk, decreased ADLS, decreased IADLS, pain,  decreased activity tolerance, and impaired judgement. Pls cont to address ADL routine germania LB dressing using LHR, LHR activities, balance, UB strengthening, sock mgmt using wide sock aid, endurance and f/u w/ pt regarding plan for purchasing phone. OT services are warranted to address above barriers.   Prognosis Good   Problem List Decreased strength;Decreased endurance;Impaired balance;Decreased mobility;Orthopedic restrictions;Pain;Impaired vision   Barriers to Discharge Decreased caregiver support   Plan   Treatment/Interventions ADL retraining;Functional transfer training;Therapeutic exercise;Endurance training   Progress Progressing toward goals   OT Therapy Minutes   OT Time In 0830   OT Time Out 1000   OT Total Time (minutes) 90   OT Mode of treatment - Individual (minutes) 90   OT Mode of treatment - Concurrent (minutes) 0   OT Mode of treatment - Group (minutes) 0   OT Mode of treatment - Co-treat (minutes) 0   OT Mode of Treatment - Total time(minutes) 90 minutes   OT Cumulative Minutes 1500   Therapy Time missed   Time missed? No

## 2025-05-02 NOTE — ASSESSMENT & PLAN NOTE
#ESRD on HD TTS:  Dialysis unit/days: [facility]  Access: [facility]  Educated on compliance with full treatment.  He has been doing better in regards to staying on treatment as long as possible.  Last hemodialysis was yesterday May 1st  Underwent 195 minutes of hemodialysis with 1.3 L removed  Plan for next hemodialysis tomorrow May 3rd

## 2025-05-02 NOTE — PHYSICAL THERAPY NOTE
"Update to PT POC:  Ed was able to perform stairs today at DS and curb step with RW at S. PT POC needs to focus on NAZARIO with both hands on 1 HR and a 6\" curb step with RW. Please work on stairs consistently being DS-Prabhjot Saturday and Sunday, as he may d/c home on Monday.   "

## 2025-05-02 NOTE — PROGRESS NOTES
"   05/02/25 7981   Pain Assessment   Pain Assessment Tool 0-10   Pain Score 9   Pain Location/Orientation Orientation: Lower;Location: Back   Pain Radiating Towards B sides   Pain Onset/Description Onset: Ongoing;Frequency: Intermittent;Descriptor: Aching;Descriptor: Sharp   Effect of Pain on Daily Activities able to tolerate therapy, with pain decreasing wiht activity. Pt did not state a new rating but did say he felt a little better after session   Patient's Stated Pain Goal No pain   Hospital Pain Intervention(s) Ambulation/increased activity;Rest   Restrictions/Precautions   Precautions Cognitive;Fall Risk;Fluid restriction;Pain;Spinal precautions;Visual deficit   ROM Restrictions Yes  (spinal precautions)   Braces or Orthoses LSO   Cognition   Overall Cognitive Status Impaired   Arousal/Participation Alert;Cooperative   Attention Attends with cues to redirect   Orientation Level Oriented X4   Memory Decreased recall of precautions   Following Commands Follows one step commands without difficulty   Subjective   Subjective \"I cant today, I cant do too much\"   Sit to Stand   Type of Assistance Needed Independent   Comment to RW   Sit to Stand CARE Score 6   Bed-Chair Transfer   Type of Assistance Needed Independent;Adaptive equipment   Comment    Chair/Bed-to-Chair Transfer CARE Score 6   Transfer Bed/Chair/Wheelchair   Limitations Noted In Balance;LE Strength;Vision   Adaptive Equipment Roller Walker   Walk 10 Feet   Type of Assistance Needed Independent;Adaptive equipment   Comment    Walk 10 Feet CARE Score 6   Ambulation   Primary Mode of Locomotion Prior to Admission Walk   Distance Walked (feet) 15 ft   Assist Device Roller Walker   Gait Pattern Inconsistant Lisa;Decreased foot clearance;Slow Lisa;Forward Flexion;Wide GARO;Step through;Improper weight shift   Limitations Noted In Balance;Endurance;Heel Strike;Speed;Strength   Walk Assist Level Modified Independent   Findings pt declined to wear " "shoes, he was wearing hospital socks only   Does the patient walk? 2. Yes   Curb or Single Stair   Style negotiated Single stair  (PPH9 6\" stairs)   Type of Assistance Needed Set-up / clean-up   Comment pt desc 3 steps fwd with RW, reqported he wanted to stop because he \"was tired\" this prompted conversation regarding potential stair/RW considerations for home use, see comments below   1 Step (Curb) CARE Score 5   4 Steps   Type of Assistance Needed Set-up / clean-up   Comment Pt completed 4 steps with BUE support on single HR after discussion regarding potential walker solutions, see comments below for more information. edu pt on proper foot placement on step for safety and balance   4 Steps CARE Score 5   Stairs   Type Stairs   # of Steps 4   Weight Bearing Precautions Fall Risk   Assist Devices Single Rail;Bilateral Rail   Findings pt practiced desc fwd with managing folded RW and single UE support on LHR (desc). He completed 3 steps and then reported \"I need to stop  because im tired\" when asked why he said he \"wasnt getting enough done i guess.\" further discussed pt concerns and pt explained again his home set up to enter/exit home. He reported that he only exits his home with the set of three stairs, then walks across grass to transportation, when entering he ascends the 7 steps with use of HR, he then brings groceries up the steps one by one when he has them, and then uses the roof next to him that is reachable for additional balance. Pt discussed that he has 3 RW at home and he would consider leaving one walker at the top of the stairs, desc with BUE support on HR, then have left a RW at bottom of stairs to then ambulate to transportation. He said he would potentially lock it to the stairs with a bike lock, or he would give one to the dialysis transporter to keep in his car if it ends up being the same person/the alternative people driving him from the dialysis transport company. Pt also discussed that " potentially he could asc/desc steps with BUE support on single HR and dialysis transport could bring RW up and down without needing physical assitance.   Other Comments   Comments Plan for pt to leave house with RW, leave one RW on top deck, desc 7-8 steps with BUE support on HR in non reciprocal pattern, have RW at bottom of stairs and ambulate to dialysis transport, reverse to return back to home. Pt was DS with this stair set up today- plan for weekend to be DS-progress to Mod I between Sunday and Monday.   Assessment   Treatment Assessment Pt engaged in skilled PT session with emphasis on problem solving through stair negotiation with dialysis transportation. See above for details. Through weekend please progress from DS to mod I as able on stairs with above mentioned method. PT POC to continue working on LE strengthening, endurance training, and standing/ambulatory balance training to be able to ambulate with RW and negotiate stairs safely upon DC home on Monday.   Problem List Decreased strength;Decreased endurance;Impaired balance;Decreased mobility;Orthopedic restrictions;Pain;Impaired vision   Barriers to Discharge Decreased caregiver support   PT Barriers   Physical Impairment Decreased strength;Decreased range of motion;Decreased endurance;Impaired balance;Decreased mobility;Impaired vision   Functional Limitation Car transfers;Stair negotiation;Standing;Transfers;Walking   Plan   Treatment/Interventions ADL retraining;Functional transfer training;LE strengthening/ROM;Elevations;Therapeutic exercise;Endurance training;Cognitive reorientation;Patient/family training;Equipment eval/education;Bed mobility;Gait training;Compensatory technique education   Progress Progressing toward goals   PT Therapy Minutes   PT Time In 1235   PT Time Out 1305   PT Total Time (minutes) 30   PT Mode of treatment - Individual (minutes) 30   PT Mode of treatment - Concurrent (minutes) 0   PT Mode of treatment - Group (minutes)  0   PT Mode of treatment - Co-treat (minutes) 0   PT Mode of Treatment - Total time(minutes) 30 minutes   PT Cumulative Minutes 1440   Therapy Time missed   Time missed? No

## 2025-05-03 ENCOUNTER — APPOINTMENT (INPATIENT)
Dept: DIALYSIS | Facility: HOSPITAL | Age: 65
DRG: 939 | End: 2025-05-03
Attending: STUDENT IN AN ORGANIZED HEALTH CARE EDUCATION/TRAINING PROGRAM
Payer: MEDICARE

## 2025-05-03 ENCOUNTER — APPOINTMENT (INPATIENT)
Dept: RADIOLOGY | Facility: HOSPITAL | Age: 65
DRG: 939 | End: 2025-05-03
Payer: MEDICARE

## 2025-05-03 PROCEDURE — 90935 HEMODIALYSIS ONE EVALUATION: CPT | Performed by: INTERNAL MEDICINE

## 2025-05-03 PROCEDURE — 97110 THERAPEUTIC EXERCISES: CPT

## 2025-05-03 PROCEDURE — 70450 CT HEAD/BRAIN W/O DYE: CPT

## 2025-05-03 PROCEDURE — 99231 SBSQ HOSP IP/OBS SF/LOW 25: CPT | Performed by: PHYSICIAN ASSISTANT

## 2025-05-03 RX ADMIN — Medication 2.5 MG: at 10:26

## 2025-05-03 RX ADMIN — NIFEDIPINE 30 MG: 30 TABLET, FILM COATED, EXTENDED RELEASE ORAL at 18:21

## 2025-05-03 RX ADMIN — OXYCODONE HYDROCHLORIDE 5 MG: 5 TABLET ORAL at 09:30

## 2025-05-03 RX ADMIN — ACETAMINOPHEN 975 MG: 325 TABLET, FILM COATED ORAL at 21:00

## 2025-05-03 RX ADMIN — ACETAMINOPHEN 975 MG: 325 TABLET, FILM COATED ORAL at 14:31

## 2025-05-03 RX ADMIN — METHOCARBAMOL 1000 MG: 500 TABLET ORAL at 05:14

## 2025-05-03 RX ADMIN — APIXABAN 5 MG: 5 TABLET, FILM COATED ORAL at 12:05

## 2025-05-03 RX ADMIN — DOCUSATE SODIUM 100 MG: 100 CAPSULE, LIQUID FILLED ORAL at 18:21

## 2025-05-03 RX ADMIN — CALCIUM CARBONATE (ANTACID) CHEW TAB 500 MG 500 MG: 500 CHEW TAB at 12:05

## 2025-05-03 RX ADMIN — DOCUSATE SODIUM 100 MG: 100 CAPSULE, LIQUID FILLED ORAL at 12:05

## 2025-05-03 RX ADMIN — METHOCARBAMOL 1000 MG: 500 TABLET ORAL at 14:33

## 2025-05-03 RX ADMIN — ALLOPURINOL 100 MG: 100 TABLET ORAL at 12:05

## 2025-05-03 RX ADMIN — ATORVASTATIN CALCIUM 80 MG: 80 TABLET, FILM COATED ORAL at 18:21

## 2025-05-03 RX ADMIN — ACETAMINOPHEN 975 MG: 325 TABLET, FILM COATED ORAL at 05:14

## 2025-05-03 RX ADMIN — METHOCARBAMOL 1000 MG: 500 TABLET ORAL at 21:00

## 2025-05-03 RX ADMIN — Medication 6 MG: at 21:00

## 2025-05-03 RX ADMIN — OXYCODONE HYDROCHLORIDE 5 MG: 5 TABLET ORAL at 23:19

## 2025-05-03 RX ADMIN — EPOETIN ALFA 8000 UNITS: 4000 SOLUTION INTRAVENOUS; SUBCUTANEOUS at 10:15

## 2025-05-03 NOTE — PLAN OF CARE
Problem: PAIN - ADULT  Goal: Verbalizes/displays adequate comfort level or baseline comfort level  Description: Interventions:- Encourage patient to monitor pain and request assistance- Assess pain using appropriate pain scale- Administer analgesics based on type and severity of pain and evaluate response- Implement non-pharmacological measures as appropriate and evaluate response- Consider cultural and social influences on pain and pain management- Notify physician/advanced practitioner if interventions unsuccessful or patient reports new pain  Outcome: Progressing     Problem: INFECTION - ADULT  Goal: Absence or prevention of progression during hospitalization  Description: INTERVENTIONS:- Assess and monitor for signs and symptoms of infection- Monitor lab/diagnostic results- Monitor all insertion sites, i.e. indwelling lines, tubes, and drains- Monitor endotracheal if appropriate and nasal secretions for changes in amount and color- [place] appropriate cooling/warming therapies per order- Administer medications as ordered- Instruct and encourage patient and family to use good hand hygiene technique- Identify and instruct in appropriate isolation precautions for identified infection/condition  Outcome: Progressing  Goal: Absence of fever/infection during neutropenic period  Description: INTERVENTIONS:- Monitor WBC  Outcome: Progressing     Problem: SAFETY ADULT  Goal: Patient will remain free of falls  Description: INTERVENTIONS:- Educate patient/family on patient safety including physical limitations- Instruct patient to call for assistance with activity - Consult OT/PT to assist with strengthening/mobility - Keep Call bell within reach- Keep bed low and locked with side rails adjusted as appropriate- Keep care items and personal belongings within reach- Initiate and maintain comfort rounds- Make Fall Risk Sign visible to staff- Offer Toileting every 2 Hours, in advance of need- Initiate/Maintain alarm-  Obtain necessary fall risk management equipment: - Apply yellow socks and bracelet for high fall risk patients- Consider moving patient to room near nurses station  Outcome: Progressing  Goal: Maintain or return to baseline ADL function  Description: INTERVENTIONS:-  Assess patient's ability to carry out ADLs; assess patient's baseline for ADL function and identify physical deficits which impact ability to perform ADLs (bathing, care of mouth/teeth, toileting, grooming, dressing, etc.)- Assess/evaluate cause of self-care deficits - Assess range of motion- Assess patient's mobility; develop plan if impaired- Assess patient's need for assistive devices and provide as appropriate- Encourage maximum independence but intervene and supervise when necessary- Involve family in performance of ADLs- Assess for home care needs following discharge - Consider OT consult to assist with ADL evaluation and planning for discharge- Provide patient education as appropriate  Outcome: Progressing  Goal: Maintains/Returns to pre admission functional level  Description: INTERVENTIONS:- Perform AM-PAC 6 Click Basic Mobility/ Daily Activity assessment daily.- Set and communicate daily mobility goal to care team and patient/family/caregiver. - Collaborate with rehabilitation services on mobility goals if consulted- Perform Range of Motion 3 times a day.- Reposition patient every 2 hours.- Dangle patient 3 times a day- Stand patient 3 times a day- Ambulate patient 3 times a day- Out of bed to chair 3 times a day - Out of bed for meals 3 times a day- Out of bed for toileting- Record patient progress and toleration of activity level   Outcome: Progressing     Problem: DISCHARGE PLANNING  Goal: Discharge to home or other facility with appropriate resources  Description: INTERVENTIONS:- Identify barriers to discharge w/patient and caregiver- Arrange for needed discharge resources and transportation as appropriate- Identify discharge learning  needs (meds, wound care, etc.)- Arrange for interpretive services to assist at discharge as needed- Refer to Case Management Department for coordinating discharge planning if the patient needs post-hospital services based on physician/advanced practitioner order or complex needs related to functional status, cognitive ability, or social support system  Outcome: Progressing     Problem: Prexisting or High Potential for Compromised Skin Integrity  Goal: Skin integrity is maintained or improved  Description: INTERVENTIONS:- Identify patients at risk for skin breakdown- Assess and monitor skin integrity- Assess and monitor nutrition and hydration status- Monitor labs - Assess for incontinence - Turn and reposition patient- Assist with mobility/ambulation- Relieve pressure over bony prominences- Avoid friction and shearing- Provide appropriate hygiene as needed including keeping skin clean and dry- Evaluate need for skin moisturizer/barrier cream- Collaborate with interdisciplinary team - Patient/family teaching- Consider wound care consult   Outcome: Progressing     Problem: METABOLIC, FLUID AND ELECTROLYTES - ADULT  Goal: Electrolytes maintained within normal limits  Description: INTERVENTIONS:- Monitor labs and assess patient for signs and symptoms of electrolyte imbalances- Administer electrolyte replacement as ordered- Monitor response to electrolyte replacements, including repeat lab results as appropriate- Instruct patient on fluid and nutrition as appropriate  Outcome: Progressing  Goal: Fluid balance maintained  Description: INTERVENTIONS:- Monitor labs - Monitor I/O and WT- Instruct patient on fluid and nutrition as appropriate- Assess for signs & symptoms of volume excess or deficit  Outcome: Progressing     Problem: Nutrition/Hydration-ADULT  Goal: Nutrient/Hydration intake appropriate for improving, restoring or maintaining nutritional needs  Description: Monitor and assess patient's nutrition/hydration  status for malnutrition. Collaborate with interdisciplinary team and initiate plan and interventions as ordered.  Monitor patient's weight and dietary intake as ordered or per policy. Utilize nutrition screening tool and intervene as necessary. Determine patient's food preferences and provide high-protein, high-caloric foods as appropriate. INTERVENTIONS:- Monitor oral intake, urinary output, labs, and treatment plans- Assess nutrition and hydration status and recommend course of action- Evaluate amount of meals eaten- Assist patient with eating if necessary - Allow adequate time for meals- Recommend/ encourage appropriate diets, oral nutritional supplements, and vitamin/mineral supplements- Order, calculate, and assess calorie counts as needed- Recommend, monitor, and adjust tube feedings and TPN/PPN based on assessed needs- Assess need for intravenous fluids- Provide specific nutrition/hydration education as appropriate- Include patient/family/caregiver in decisions related to nutrition  Outcome: Progressing     Problem: Nutrition/Hydration-ADULT  Goal: Nutrient/Hydration intake appropriate for improving, restoring or maintaining nutritional needs  Description: Monitor and assess patient's nutrition/hydration status for malnutrition. Collaborate with interdisciplinary team and initiate plan and interventions as ordered.  Monitor patient's weight and dietary intake as ordered or per policy. Utilize nutrition screening tool and intervene as necessary. Determine patient's food preferences and provide high-protein, high-caloric foods as appropriate. INTERVENTIONS:- Monitor oral intake, urinary output, labs, and treatment plans- Assess nutrition and hydration status and recommend course of action- Evaluate amount of meals eaten- Assist patient with eating if necessary - Allow adequate time for meals- Recommend/ encourage appropriate diets, oral nutritional supplements, and vitamin/mineral supplements- Order, calculate,  and assess calorie counts as needed- Recommend, monitor, and adjust tube feedings and TPN/PPN based on assessed needs- Assess need for intravenous fluids- Provide specific nutrition/hydration education as appropriate- Include patient/family/caregiver in decisions related to nutrition  Outcome: Progressing

## 2025-05-03 NOTE — ASSESSMENT & PLAN NOTE
Serum potassium 5.5 on 4/29/2025, continue to monitor with dialysis.  Still low potassium diet recommended.

## 2025-05-03 NOTE — PROGRESS NOTES
"Progress Note - Hospitalist   Name: Naresh Carpio 64 y.o. male I MRN: [id number]  Unit/Bed#: -01 I Date of Admission: 4/11/2025   Date of Service: 5/3/2025 I Hospital Day: 22    Assessment & Plan  Traumatic retroperitoneal hematoma  Admitted to the trauma service after a fall with L2 vertebral body and retroperitoneal hematoma.  S/p IR embolization of distal right deep circumflex iliac artery/distal right superior gluteal artery both of which supplied an area of active extravasation and then pseudoaneurysms present at distal branches of the left inferior gluteal artery. The artery was embolized using coils and gelfoam.   He was type and screened 4/17/25 in case needed blood during HD on that day.  Hgb 4/22 improved to 8.4 and 4/29 was 8.0  Anemia due to chronic kidney disease, on chronic dialysis ([facility])  Had 5 U PRBCs during hospital stay  Hemoglobin has been running mostly 7.3-7.4.    On 4/14/25 was 7.6; on 4/15/24 was 7.2 ---> Renal increased his Epogen in light of his hemoglobin  CBC Tuesday in HD 4/29/25 = 8.0 and stable  ESRD (end stage renal disease) ([facility])  Continue Sevelamer TID with meals  Renal following  HD T-TH-Sat while here in rehab  Paroxysmal atrial fibrillation ([facility])  On no rate control medications  Examines RRR  Anticoagulation with Eliquis as at home but he doesn't want to take it because he is worried it will make his right eye vision worse.  I d/w on call Optho Dr Diaz = \"The risk is not quantified in the literature. Dr edmond's note suggests a diagnosis of proliferative diabetic retinopathy and bilateral and recurrent vitreous hemorrhage. So he is at risk for - and has had hemorrhage - whether or not he is on eliquis (assuming dr edmond is correct).     I would always recommend life and death are more important so to do what you recommend re his systemic condition, and then to follow up with dr edmond as he recommended so a real exam can be done in the clinic\".   Explained all this " "to him and he understands  He had been taking the Eliquis again and now refusing it because he is afraid of his foot bleeding.  He says \"no point in discussing it\"; I d/w him but again refuses to take it.  He is fully aware that he may have a stroke if he does not take the Eliquis  Chronic diastolic (congestive) heart failure (HCC)  Some LE edema = chronic/stable  Renal diet  stable  Volume management via HD  Primary hypertension  Continue Procardia XL 30 mg qd  Tx per renal  Stable; no changes today  Visual disturbance  Reported a right eye \"floater\" without associated pain earlier in his hospitalization which has resolved but continued to have blurry central vision.  Ophthalmology saw here in [facility] = visual acuity without correction is 20/400 OU. Dx: Proliferative diabetic retinopathy with vitreous hemorrhage OU as well as cataracts.   Will need outpatient follow up immediately after discharge from rehab  Diabetic ulcer of left midfoot associated with type 2 diabetes mellitus, limited to breakdown of skin (HCC)  Continue local care  Being followed by WC  Last LEADS was 3/28/25 = 50-75% right prox polital and prox tibioperoneal trunk; >75% stenosis in prox popliteal artery on left  Was seen by VS in 12/2024 hospital stay and he declined any intervention at the time  Podiatry saw here on 4/22/25, s/p debridement = no surgical intervention needed at this time  Had new bleeding from foot wound 4/27/25. The bleeding then resolved. Podiatry was made aware.  Not sure this is from DM but PAD.  He has normal BSs and is not on any DM meds.  +pre-DM  Hyponatremia  Renal following  Continue fluid restriction 1200 cc per 24 hours  Closed fracture of proximal end of left humerus with routine healing  Occurred from a fall in February and eval'd at the time of the injury  Was to continue NWB/sling and followup with Ortho but there was no followup done  Xray 4/13/25 = \"Unchanged alignment of the chronic greater tuberosity avulsion " "fracture\".   Ortho saw here on 4/14 = WBAT; see Dr Cisneros 4 weeks @ 5/14/25  Gout  Continue Allopurinol  L2 vertebral fracture (HCC)  CT CAP 4/2: Fracture of L2 vertebral body extending to the superior endplate  Continue LSO brace  Insomnia  Patient reports not sleeping well at night because he has trouble getting comfortable. He has been trying to sleep in the recliner   PAD (peripheral artery disease) ([facility])  Vascular surgery saw per Podiatry's recommendation = they will see in the office.  They advised start ASA but after the d/w the PA in relation to his vitreous hemorrhages, can hold off on that and d/w him in the office.  This will give him time to see the Retina specialist as OP as about getting tx  Type 2 diabetes mellitus with foot ulcer, without long-term current use of insulin ([facility])  HgbA1C was 5.7 on 2/9/25  Not on medications at here/home   FBSs have been normal  On Regular diet    The above assessment and plan was reviewed and updated as determined by my evaluation of the patient on 5/3/2025.    History of Present Illness   Patient seen and examined. Patients overnight issues or events were reviewed with nursing staff. New or overnight issues include the following:   Patient sitting up in chair. He had dialysis this AM. He states he doesn't feel great after dialysis and he just wants to sleep.     Review of Systems    Objective :  Temp:  [97.6 °F (36.4 °C)-98.2 °F (36.8 °C)] 97.9 °F (36.6 °C)  HR:  [60-82] 82  BP: (134-173)/(55-82) 148/76  Resp:  [16-18] 18  SpO2:  [95 %-99 %] 99 %  O2 Device: None (Room air)    Invasive Devices       Hemodialysis Catheter  Duration             HD Permanent Double Catheter 612 days                    Physical Exam  General Appearance: NAD; pleasant  HEENT: PERRLA, conjuctiva normal; mucous membranes moist; face symmetrical  Neck:  Supple  Lungs: clear bilaterally, normal respiratory effort, no retractions, expiratory effort normal, on room air  CV: regular rate and " rhythm, no murmurs rubs or gallops noted   ABD: soft non tender, +BS x4  EXT: DP pulses intact, no lymphadenopathy, significant mansih LE edema  Skin: normal turgor, normal texture, no rash  Psych: affect normal, mood normal  Neuro: AAOx3    The above physical exam was reviewed and updated as determined by my evaluation of the patient on 5/3/2025.      Lab Results: I have reviewed the following results:  Results from last 7 days   Lab Units 04/29/25  1350   WBC Thousand/uL 7.55   HEMOGLOBIN g/dL 8.0*   HEMATOCRIT % 26.3*   PLATELETS Thousands/uL 224     Results from last 7 days   Lab Units 04/29/25  1350   SODIUM mmol/L 132*   POTASSIUM mmol/L 5.5*   CHLORIDE mmol/L 96   CO2 mmol/L 23   BUN mg/dL 60*   CREATININE mg/dL 8.38*   CALCIUM mg/dL 9.2                       Review of Scheduled Meds: Medications  reviewed and reconciled as needed  Current Facility-Administered Medications   Medication Dose Route Frequency Provider Last Rate    acetaminophen  975 mg Oral Q8H [facility] Damian Cerda MD      allopurinol  100 mg Oral Daily Damian Cerda MD      apixaban  5 mg Oral BID Damian Cerda MD      atorvastatin  80 mg Oral Daily With Dinner Damian Cerda MD      bisacodyl  10 mg Rectal Daily PRN Michelle Landa,       calcium carbonate  500 mg Oral Daily PRN GRANT Church      docusate sodium  100 mg Oral BID Michelle Landa, DO      epoetin jessica  8,000 Units Intravenous After Dialysis German Linton MD      gabapentin  100 mg Oral Once per day on Monday Wednesday Friday Damian Cerda MD      lidocaine  1 patch Topical Daily PRN Damian Cerda MD      melatonin  6 mg Oral HS Jayden Rowland MD      methocarbamol  1,000 mg Oral Q8H [facility] Jayden Rowland MD      naloxone  0.04 mg Intravenous Q1MIN PRN Damian Cerda MD      NIFEdipine  30 mg Oral After Dinner Damian Cerda MD      ondansetron  4 mg Oral Q6H PRN Gino Austin PA-C      oxyCODONE  5 mg Oral Q4H PRN Damian Cerda MD      Or    oxyCODONE  10 mg  Oral Q4H PRN Damian Cerda MD      oxyCODONE  2.5 mg Oral Q8H PRN Jayden Rowland MD      polyethylene glycol  17 g Oral Daily PRN Michelle Landa, DO      senna  2 tablet Oral Daily With Lunch Michelle Landa, DO      sevelamer  1,600 mg Oral TID With Meals Damian Cerda MD      white petrolatum-mineral oil   Topical TID PRN Michelle Landa, DO         VTE Pharmacologic Prophylaxis: eliquis but patient has been refusing it  Code Status: Level 1 - Full Code  Current Length of Stay: 22 day(s)    Administrative Statements     ** Please Note:  voice to text software may have been used in the creation of this document. Although proof errors in transcription or interpretation are a potential of such software**

## 2025-05-03 NOTE — PROGRESS NOTES
05/03/25 1430   Restrictions/Precautions   Precautions Cognitive;Fall Risk;Fluid restriction;Pain;Spinal precautions;Visual deficit   RUE Weight Bearing Per Order WBAT   LUE Weight Bearing Per Order WBAT   RLE Weight Bearing Per Order WBAT   LLE Weight Bearing Per Order WBAT   Braces or Orthoses LSO   Cognition   Overall Cognitive Status Impaired   Arousal/Participation Alert;Cooperative   Attention Attends with cues to redirect   Orientation Level Oriented X4   Memory Decreased recall of precautions   Following Commands Follows one step commands without difficulty   Subjective   Subjective pt agreeable to perform skilled PT and instructed him with HEP . Pt request to stay inside his room for PT d/t dailysis treatment was not good , Pt reports with nsging increase dizziness and very LE weakness .   Sit to Stand   Type of Assistance Needed Independent;Adaptive equipment   Comment RW   Sit to Stand CARE Score 6   Bed-Chair Transfer   Type of Assistance Needed Independent;Adaptive equipment   Comment RW   Chair/Bed-to-Chair Transfer CARE Score 6   Transfer Bed/Chair/Wheelchair   Adaptive Equipment Roller Walker   Therapeutic Interventions   Strengthening Access Code: [email or web address]  URL: [email or web address]  Date: 05/03/2025  Prepared by: Ant Barber    Exercises  - Supine Bridge  - 1 x daily - 7 x weekly - 3 sets - 10 reps  - Supine Quad Set  - 1 x daily - 7 x weekly - 3 sets - 10 reps  - Bent Knee Fallouts  - 1 x daily - 7 x weekly - 3 sets - 10 reps  - Small Range Straight Leg Raise  - 1 x daily - 7 x weekly - 3 sets - 10 reps  - Supine Gluteal Sets  - 1 x daily - 7 x weekly - 3 sets - 10 reps  - Supine Short Arc Quad  - 1 x daily - 7 x weekly - 3 sets - 10 reps  - Seated Long Arc Quad  - 1 x daily - 7 x weekly - 3 sets - 10 reps  - Seated March  - 1 x daily - 7 x weekly - 3 sets - 10 reps  - Seated Hip Adduction Isometrics with Ball  - 1 x daily - 7 x weekly - 3 sets - 10 reps  - Seated Hip Abduction  with Resistance  - 1 x daily - 7 x weekly - 3 sets - 10 reps   Flexibility manual stretch LE   Assessment   Treatment Assessment pt limited with skilled PT today d/t form AM dialysis but able to perform HEP and STS SPT with RW . pt c/o being weak germania LE and cont to sit in his recliner and reports he's very comfortable inhis recliner .Pt needs to perform FF stairs and curb step tomorrow d/t possible DC next week. Pt cont to need skilled PT to meet DC goals   Barriers to Discharge Decreased caregiver support   PT Barriers   Physical Impairment Decreased strength;Decreased range of motion;Decreased endurance;Impaired balance;Decreased mobility;Impaired vision   Plan   Treatment/Interventions Functional transfer training;LE strengthening/ROM;Therapeutic exercise;Endurance training;Cognitive reorientation;Bed mobility   Progress Progressing toward goals   PT Therapy Minutes   PT Time In 1430   PT Time Out 1500   PT Total Time (minutes) 30   PT Mode of treatment - Individual (minutes) 30   PT Mode of treatment - Concurrent (minutes) 0   PT Mode of treatment - Group (minutes) 0   PT Mode of treatment - Co-treat (minutes) 0   PT Mode of Treatment - Total time(minutes) 30 minutes   PT Cumulative Minutes 1530   Therapy Time missed   Time missed? No

## 2025-05-03 NOTE — ASSESSMENT & PLAN NOTE
- This is due to lack of free water clearance in ESRD  - Sodium 132.  - fluid restriction 1200 cc per 24 hours

## 2025-05-03 NOTE — PROGRESS NOTES
NEPHROLOGY PROGRESS NOTE   Naresh Carpio 64 y.o. male MRN: [id number]  Unit/Bed#: -01 Encounter: [id number]  Reason for Consult: ESRD    ASSESSMENT AND PLAN:  Assessment & Plan  ESRD (end stage renal disease) (Formerly [facility])  #ESRD on HD TTS:  Dialysis unit/days: [facility]  Access: PermCath  HD today.  Outpatient dry weight 96.5 kg, pre HD weight 93.2 kg.  Will change dry weight to 92.5 kg.  UF removal to challenge dry weight as BP tolerated.    I saw and examined patient during hemodialysis treatment at 9:22 AM on 5/3/2025. The patient was receiving hemodialysis for treatment of end stage renal disease. I also reviewed vital signs, intake and output, lab results and recent events, and agree with dialysis order.  Tolerating HD. BP acceptable  Primary hypertension  Volume mediated, and pain related as well  Blood pressure is acceptable with occasional high readings noted.  Low-sodium diet   Nifedipine 30 mg daily   Continue ultrafiltration with dialysis.  Anemia due to chronic kidney disease, on chronic dialysis (Formerly [facility])  Continue Epogen 8000 units with dialysis  Last hemoglobin was 8  Traumatic retroperitoneal hematoma  S/p IR embolization  Hemoglobin remained stable  Secondary hyperparathyroidism (Formerly [facility])  Sevelamer with meals 3 times daily   Continue renal diet  Continue current management  Hyponatremia  - This is due to lack of free water clearance in ESRD  - Sodium 132.  - fluid restriction 1200 cc per 24 hours  Hyperkalemia  Serum potassium 5.5 on 4/29/2025, continue to monitor with dialysis.  Still low potassium diet recommended.       Discussed above plan in detail with primary team regarding plan for dialysis today and they agree with above recommendations.      SUBJECTIVE:  Patient seen and examined at bedside.  Denies any nausea or vomiting    OBJECTIVE:  Current Weight: Weight - Scale: 93.4 kg (205 lb 12.8 oz)  Vitals:    05/03/25 0900   BP: 134/69   Pulse: 68   Resp: 16   Temp:    SpO2:        Intake/Output  Summary (Last 24 hours) at 5/3/2025 0921  Last data filed at 5/3/2025 0829  Gross per 24 hour   Intake 530 ml   Output 100 ml   Net 430 ml     Wt Readings from Last 3 Encounters:   05/02/25 93.4 kg (205 lb 12.8 oz)   04/11/25 98.5 kg (217 lb 2.5 oz)   03/24/25 107 kg (234 lb 12.6 oz)     Temp Readings from Last 3 Encounters:   05/03/25 98 °F (36.7 °C) (Temporal)   04/11/25 99 °F (37.2 °C)   04/02/25 98 °F (36.7 °C) (Oral)     BP Readings from Last 3 Encounters:   05/03/25 134/69   04/11/25 153/55   04/02/25 112/57     Pulse Readings from Last 3 Encounters:   05/03/25 68   04/11/25 61   04/02/25 91        Physical Examination:  Eyes: Mild conjunctival pallor present  Neck: No obvious lymphadenopathy appreciated  Respiratory: Bilateral air entry present  CVS: 1+ edema in legs  GI: Soft, nondistended  CNS: Active, alert, follows commands  Skin: No new rash  Musculoskeletal: No obvious new gross deformity noted    Medications:    Current Facility-Administered Medications:     acetaminophen (TYLENOL) tablet 975 mg, 975 mg, Oral, Q8H KEMAL, Damian Cerda MD, 975 mg at 05/03/25 0514    allopurinol (ZYLOPRIM) tablet 100 mg, 100 mg, Oral, Daily, Damian Cerda MD, 100 mg at 05/02/25 0752    aluminum-magnesium hydroxide-simethicone (MAALOX) oral suspension 30 mL, 30 mL, Oral, Q4H PRN, GRANT Church, 30 mL at 04/28/25 2121    apixaban (ELIQUIS) tablet 5 mg, 5 mg, Oral, BID, Damian Cerda MD, 5 mg at 04/27/25 1700    atorvastatin (LIPITOR) tablet 80 mg, 80 mg, Oral, Daily With Dinner, Damian Cerda MD, 80 mg at 05/02/25 1728    bisacodyl (DULCOLAX) rectal suppository 10 mg, 10 mg, Rectal, Daily PRN, Michelle Landa DO, 10 mg at 04/18/25 0000    calcium carbonate (TUMS) chewable tablet 500 mg, 500 mg, Oral, Daily PRN, GRANT Church, 500 mg at 05/02/25 1228    docusate sodium (COLACE) capsule 100 mg, 100 mg, Oral, BID, Michelle Landa DO, 100 mg at 05/02/25 1728    epoetin jessica (EPOGEN,PROCRIT) injection 8,000 Units,  8,000 Units, Intravenous, After Dialysis, German Linton MD, 8,000 Units at 05/01/25 1628    gabapentin (NEURONTIN) capsule 100 mg, 100 mg, Oral, Once per day on Monday Wednesday Friday, Damian Cerda MD, 100 mg at 05/02/25 0752    lidocaine (LIDODERM) 5 % patch 1 patch, 1 patch, Topical, Daily PRN, Damian Cerda MD, 1 patch at 04/18/25 2135    melatonin tablet 6 mg, 6 mg, Oral, HS, Jayden Rowland MD, 6 mg at 05/02/25 2114    methocarbamol (ROBAXIN) tablet 1,000 mg, 1,000 mg, Oral, Q8H KEMAL, Jayden Rowland MD, 1,000 mg at 05/03/25 0514    naloxone (NARCAN) 0.04 mg/mL syringe 0.04 mg, 0.04 mg, Intravenous, Q1MIN PRN, Damian Cerda MD    NIFEdipine (PROCARDIA XL) 24 hr tablet 30 mg, 30 mg, Oral, After Dinner, Damian Cerda MD, 30 mg at 05/02/25 1728    ondansetron (ZOFRAN-ODT) dispersible tablet 4 mg, 4 mg, Oral, Q6H PRN, Gino Austin PA-C, 4 mg at 04/20/25 0023    oxyCODONE (ROXICODONE) IR tablet 5 mg, 5 mg, Oral, Q4H PRN, 5 mg at 05/02/25 0351 **OR** oxyCODONE (ROXICODONE) immediate release tablet 10 mg, 10 mg, Oral, Q4H PRN, Damian Cerda MD, 10 mg at 05/02/25 2306    oxyCODONE (ROXICODONE) split tablet 2.5 mg, 2.5 mg, Oral, Q8H PRN, Jayden Rowland MD, 2.5 mg at 05/01/25 1433    polyethylene glycol (MIRALAX) packet 17 g, 17 g, Oral, Daily PRN, Michelle Landa DO, 17 g at 04/26/25 1259    senna (SENOKOT) tablet 17.2 mg, 2 tablet, Oral, Daily With Lunch, Michelle Landa DO, 17.2 mg at 05/02/25 1225    sevelamer (RENAGEL) tablet 1,600 mg, 1,600 mg, Oral, TID With Meals, Damian Cerda MD, 1,600 mg at 05/02/25 1728    white petrolatum-mineral oil (EUCERIN,HYDROCERIN) cream, , Topical, TID PRN, Michelle Landa DO    Laboratory Results:  Results from last 7 days   Lab Units 04/29/25  1350   WBC Thousand/uL 7.55   HEMOGLOBIN g/dL 8.0*   HEMATOCRIT % 26.3*   PLATELETS Thousands/uL 224   SODIUM mmol/L 132*   POTASSIUM mmol/L 5.5*   CHLORIDE mmol/L 96   CO2 mmol/L 23   BUN mg/dL 60*   CREATININE mg/dL  "8.38*   CALCIUM mg/dL 9.2       VAS SALVATORE and waveform analysis, multiple levels   Final Result by Joey Gurrola DO (04/23 1552)      XR foot 3+ vw left   Final Result by Santos Fuller MD (04/23 1507)      Intact fifth metatarsal without radiographic evidence osteomyelitis. If suspected consider MRI.      No acute osseous abnormality.         Workstation performed: [id number]         XR humerus left   Final Result by Santos Patricia MD (04/14 0902)      Unchanged alignment of the chronic greater tuberosity avulsion fracture.      Workstation performed: [id number]             Portions of the record may have been created with voice recognition software. Occasional wrong word or \"sound a like\" substitutions may have occurred due to the inherent limitations of voice recognition software. Read the chart carefully and recognize, using context, where substitutions have occurred.  "

## 2025-05-03 NOTE — ASSESSMENT & PLAN NOTE
Volume mediated, and pain related as well  Blood pressure is acceptable with occasional high readings noted.  Low-sodium diet   Nifedipine 30 mg daily   Continue ultrafiltration with dialysis.

## 2025-05-03 NOTE — PLAN OF CARE
Target UF Goal  1  L as tolerated. Patient dialyzing for  3.5 hrs  on 2 K bath for serum K of  5.2  per protocol. Treatment plan reviewed with Nephrology. Post-Dialysis RN Treatment Note    Blood Pressure:  Pre 153/75 mm/Hg  Post 161/72 mmHg   EDW:  96.5 kg    Weight:  Pre 94 kg   Post 92.4 kg   Mode of weight measurement: Standing Scale   Volume Removed:  1000 ml    Treatment duration: 3 hrs Dr Rod informed    NS given:  No    Treatment shortened yes, uncontrolled pain   Medications given during Rx: EPO 8000u, oxycodone 5 mg. Oxycodone 2.5 mg   Estimated Kt/V:  .98   Access type: Permacath/TDC   Needle Gauge:  na   Access Issues: No    Report called to primary nurse:   Yes Meaghan Ernandez RN  Problem: METABOLIC, FLUID AND ELECTROLYTES - ADULT  Goal: Electrolytes maintained within normal limits  Description: INTERVENTIONS:- Monitor labs and assess patient for signs and symptoms of electrolyte imbalances- Administer electrolyte replacement as ordered- Monitor response to electrolyte replacements, including repeat lab results as appropriate- Instruct patient on fluid and nutrition as appropriate  Outcome: Progressing  Goal: Fluid balance maintained  Description: INTERVENTIONS:- Monitor labs - Monitor I/O and WT- Instruct patient on fluid and nutrition as appropriate- Assess for signs & symptoms of volume excess or deficit  Outcome: Progressing

## 2025-05-03 NOTE — ASSESSMENT & PLAN NOTE
#ESRD on HD TTS:  Dialysis unit/days: [facility]  Access: PermCath  HD today.  Outpatient dry weight 96.5 kg, pre HD weight 93.2 kg.  Will change dry weight to 92.5 kg.  UF removal to challenge dry weight as BP tolerated.    I saw and examined patient during hemodialysis treatment at 9:22 AM on 5/3/2025. The patient was receiving hemodialysis for treatment of end stage renal disease. I also reviewed vital signs, intake and output, lab results and recent events, and agree with dialysis order.  Tolerating HD. BP acceptable

## 2025-05-04 PROBLEM — R44.3 HALLUCINATIONS: Status: ACTIVE | Noted: 2025-05-04

## 2025-05-04 LAB
ALBUMIN SERPL BCG-MCNC: 4.2 G/DL (ref 3.5–5)
ALP SERPL-CCNC: 118 U/L (ref 34–104)
ALT SERPL W P-5'-P-CCNC: 7 U/L (ref 7–52)
ANION GAP SERPL CALCULATED.3IONS-SCNC: 12 MMOL/L (ref 4–13)
AST SERPL W P-5'-P-CCNC: 14 U/L (ref 13–39)
BILIRUB SERPL-MCNC: 0.65 MG/DL (ref 0.2–1)
BUN SERPL-MCNC: 24 MG/DL (ref 5–25)
CALCIUM SERPL-MCNC: 9.3 MG/DL (ref 8.4–10.2)
CHLORIDE SERPL-SCNC: 95 MMOL/L (ref 96–108)
CO2 SERPL-SCNC: 22 MMOL/L (ref 21–32)
CREAT SERPL-MCNC: 4.7 MG/DL (ref 0.6–1.3)
ERYTHROCYTE [DISTWIDTH] IN BLOOD BY AUTOMATED COUNT: 18.4 % (ref 11.6–15.1)
GFR SERPL CREATININE-BSD FRML MDRD: 12 ML/MIN/1.73SQ M
GLUCOSE SERPL-MCNC: 82 MG/DL (ref 65–140)
HCT VFR BLD AUTO: 32.2 % (ref 36.5–49.3)
HGB BLD-MCNC: 9.7 G/DL (ref 12–17)
MCH RBC QN AUTO: 29.3 PG (ref 26.8–34.3)
MCHC RBC AUTO-ENTMCNC: 30.1 G/DL (ref 31.4–37.4)
MCV RBC AUTO: 97 FL (ref 82–98)
PLATELET # BLD AUTO: 214 THOUSANDS/UL (ref 149–390)
PMV BLD AUTO: 8.9 FL (ref 8.9–12.7)
POTASSIUM SERPL-SCNC: 4.2 MMOL/L (ref 3.5–5.3)
PROT SERPL-MCNC: 8.3 G/DL (ref 6.4–8.4)
RBC # BLD AUTO: 3.31 MILLION/UL (ref 3.88–5.62)
SODIUM SERPL-SCNC: 129 MMOL/L (ref 135–147)
WBC # BLD AUTO: 5.38 THOUSAND/UL (ref 4.31–10.16)

## 2025-05-04 PROCEDURE — 97116 GAIT TRAINING THERAPY: CPT

## 2025-05-04 PROCEDURE — 97530 THERAPEUTIC ACTIVITIES: CPT

## 2025-05-04 PROCEDURE — 85027 COMPLETE CBC AUTOMATED: CPT | Performed by: PHYSICIAN ASSISTANT

## 2025-05-04 PROCEDURE — 97110 THERAPEUTIC EXERCISES: CPT

## 2025-05-04 PROCEDURE — 80053 COMPREHEN METABOLIC PANEL: CPT | Performed by: PHYSICIAN ASSISTANT

## 2025-05-04 PROCEDURE — 97535 SELF CARE MNGMENT TRAINING: CPT

## 2025-05-04 PROCEDURE — 99232 SBSQ HOSP IP/OBS MODERATE 35: CPT | Performed by: PHYSICIAN ASSISTANT

## 2025-05-04 RX ADMIN — OXYCODONE HYDROCHLORIDE 5 MG: 5 TABLET ORAL at 21:22

## 2025-05-04 RX ADMIN — OXYCODONE HYDROCHLORIDE 5 MG: 5 TABLET ORAL at 08:32

## 2025-05-04 RX ADMIN — OXYCODONE HYDROCHLORIDE 5 MG: 5 TABLET ORAL at 16:10

## 2025-05-04 RX ADMIN — DOCUSATE SODIUM 100 MG: 100 CAPSULE, LIQUID FILLED ORAL at 17:54

## 2025-05-04 RX ADMIN — Medication 6 MG: at 21:21

## 2025-05-04 RX ADMIN — ACETAMINOPHEN 975 MG: 325 TABLET, FILM COATED ORAL at 21:21

## 2025-05-04 RX ADMIN — METHOCARBAMOL 1000 MG: 500 TABLET ORAL at 14:27

## 2025-05-04 RX ADMIN — APIXABAN 5 MG: 5 TABLET, FILM COATED ORAL at 08:29

## 2025-05-04 RX ADMIN — ACETAMINOPHEN 975 MG: 325 TABLET, FILM COATED ORAL at 05:06

## 2025-05-04 RX ADMIN — METHOCARBAMOL 1000 MG: 500 TABLET ORAL at 05:06

## 2025-05-04 RX ADMIN — NIFEDIPINE 30 MG: 30 TABLET, FILM COATED, EXTENDED RELEASE ORAL at 17:54

## 2025-05-04 RX ADMIN — DOCUSATE SODIUM 100 MG: 100 CAPSULE, LIQUID FILLED ORAL at 08:29

## 2025-05-04 RX ADMIN — METHOCARBAMOL 1000 MG: 500 TABLET ORAL at 21:22

## 2025-05-04 RX ADMIN — SEVELAMER HYDROCHLORIDE 1600 MG: 800 TABLET ORAL at 12:27

## 2025-05-04 RX ADMIN — ALLOPURINOL 100 MG: 100 TABLET ORAL at 08:29

## 2025-05-04 RX ADMIN — ACETAMINOPHEN 975 MG: 325 TABLET, FILM COATED ORAL at 14:27

## 2025-05-04 RX ADMIN — SEVELAMER HYDROCHLORIDE 1600 MG: 800 TABLET ORAL at 16:10

## 2025-05-04 RX ADMIN — ATORVASTATIN CALCIUM 80 MG: 80 TABLET, FILM COATED ORAL at 16:10

## 2025-05-04 RX ADMIN — APIXABAN 5 MG: 5 TABLET, FILM COATED ORAL at 17:54

## 2025-05-04 RX ADMIN — SENNOSIDES 17.2 MG: 8.6 TABLET, FILM COATED ORAL at 12:27

## 2025-05-04 NOTE — NURSING NOTE
Reached out to SLIM regarding pt's hallucinations of seeing people in his room that weren't staff or visitors.  . Pt needed redirection several times to current situation, anxious about losing his identification cards which are currently located in his top dresser drawer. Pt is IRP with RW & LSO brace, ambulating to BR . Last time pt had narcotics was at 1030 pt refused Eliquis twice today, CT scan head ordered.

## 2025-05-04 NOTE — ASSESSMENT & PLAN NOTE
Had 5 U PRBCs during hospital stay  Hemoglobin has been running mostly 7.3-7.4.    On 4/14/25 was 7.6; on 4/15/24 was 7.2 ---> Renal increased his Epogen in light of his hemoglobin  CBC Tuesday in HD 4/29/25 = 8.0 and stable  Hgb today 9.7

## 2025-05-04 NOTE — PROGRESS NOTES
"   05/04/25 0845   Pain Assessment   Pain Assessment Tool 0-10   Pain Score 8   Pain Location/Orientation Location: Back   Restrictions/Precautions   Precautions Bed/chair alarms;Cognitive;Fall Risk;Pain;Spinal precautions;Supervision on toilet/commode;Visual deficit  (HD w/ port)   ROM Restrictions Yes  (lumbar spinal prec)   Braces or Orthoses LSO   Lifestyle   Autonomy (S)  \"I guess that's what you're going to have to do then.\" referring to applying chair/bed alarms and requiring pt to ring for assist from staff for mobility and toileting in room   Oral Hygiene   Type of Assistance Needed Independent   Comment in stance at sink.   Oral Hygiene CARE Score 6   Grooming   Findings mod I in stance at sink   Shower/Bathe Self   Comment Pt refuses full SB routine. Agreeable to wash UB seatd in recliner. Completes at BELLAMY level   Reason if not Attempted Refused to perform   Shower/Bathe Self CARE Score 7   Tub/Shower Transfer   Reason Not Assessed Medical;Sponge Bath   Findings HD port   Upper Body Dressing   Type of Assistance Needed Supervision   Comment don/doff T shirt Judi. Cues for donning LSO brace prior to standing and to adjust while seated vs standing   Upper Body Dressing CARE Score 4   Lower Body Dressing   Reason if not Attempted Refused to perform   Lower Body Dressing CARE Score 7   Putting On/Taking Off Footwear   Reason if not Attempted Refused to perform   Putting On/Taking Off Footwear CARE Score 7   Sit to Stand   Type of Assistance Needed Supervision   Comment Pt does not attempt to don LSO prior to stand. Cues to don.   Sit to Stand CARE Score 4   Toileting Hygiene   Type of Assistance Needed Independent   Comment mod I in stance at toilet   Toileting Hygiene CARE Score 6   Toilet Transfer   Comment voids in stance at toilet using GB for steading   Transfers   Additional Comments MObility trial in hallway at RW level, Sup for safety.   Cognition   Overall Cognitive Status Impaired " "  Arousal/Participation Alert   Attention Attends with cues to redirect   Memory Decreased short term memory;Decreased recall of recent events;Decreased recall of precautions   Following Commands Follows one step commands with increased time or repetition   Comments dec insight, poor safety w/ need for LSO prior to mobility   Additional Activities   Additional Activities Comments (S)  See seperate OT note. Pt refusing to discuss yesterdays events considering hullucinations, irregular behavior. D/t this, IRP revoked. Alarms reapplied to recliner. Discussed this w/ KIYA Martin and CURLY Harding, encouraging them to score pt as his abilities present at the time, provide feedback on behaviors. Pt does largely appear at his behavioral baseline this morning. If he continues at baseline level may be appropriate to reassess fro IRP on Monday.   Activity Tolerance   Activity Tolerance Patient limited by pain   Assessment   Treatment Assessment OT session focusing on ADLs, mobility, transfer trials and safety edu. See above and seperate OT note for full details. IRP revoked 2' yesterdays events. OT to continue POC at this time. Pt agreeable to UB ADLs but refused LB. Pt also states, \"Theres no way I'm going home tomorrow. I'm not ready.\"   Problem List Decreased range of motion;Decreased strength;Decreased endurance;Impaired balance;Decreased mobility;Decreased coordination;Decreased cognition;Impaired judgement;Decreased safety awareness;Impaired vision;Impaired sensation;Impaired tone;Decreased skin integrity;Orthopedic restrictions;Pain   Plan   Treatment/Interventions ADL retraining;Functional transfer training;Therapeutic exercise;Endurance training;Cognitive reorientation;Patient/family training;Equipment eval/education;Compensatory technique education;Continued evaluation;Spoke to nursing   Progress Progressing toward goals   OT Therapy Minutes   OT Time In 0845   OT Time Out 1015   OT Total Time (minutes) 90   OT Mode of " treatment - Individual (minutes) 90   OT Mode of treatment - Concurrent (minutes) 0   OT Mode of treatment - Group (minutes) 0   OT Mode of treatment - Co-treat (minutes) 0   OT Mode of Treatment - Total time(minutes) 90 minutes   OT Cumulative Minutes 1590   Therapy Time missed   Time missed? No

## 2025-05-04 NOTE — ASSESSMENT & PLAN NOTE
Nursing reports he was confused and hallucinating lat night, he was seeing people in his room that weren't there.   STAT head CT- no acute findings  Nursing reports he was having pain in dialysis yesterday so he was given oxy at 930am and then again at 1030am because it hadn't improved with the first dose- may have contributed to his increased confusion  Afebrile. WBC normal  His sodium did drop again to 129- nephro following  He seems back to baseline this AM. Will continue to monitor

## 2025-05-04 NOTE — PROGRESS NOTES
05/04/25 1430   Pain Assessment   Pain Assessment Tool 0-10   Pain Score 5   Pain Location/Orientation Location: Back   Restrictions/Precautions   Precautions Bed/chair alarms;Cognitive;Fall Risk;Pain;Spinal precautions;Supervision on toilet/commode;Visual deficit  (HD w/ port)   RUE Weight Bearing Per Order WBAT   LUE Weight Bearing Per Order WBAT   RLE Weight Bearing Per Order WBAT   LLE Weight Bearing Per Order WBAT   ROM Restrictions Yes  (lumbar spinal prec)   Braces or Orthoses LSO   General   Change In Medical/Functional Status hold on IRP today d/t confusion and see nsging and OT note yesterday and early this AM session with OT Luzma   Subjective   Subjective pt agreeable to perform skilled PT   Sit to Stand   Type of Assistance Needed Supervision;Adaptive equipment   Comment RW   Sit to Stand CARE Score 4   Bed-Chair Transfer   Type of Assistance Needed Supervision;Adaptive equipment   Comment RW   Chair/Bed-to-Chair Transfer CARE Score 4   Transfer Bed/Chair/Wheelchair   Adaptive Equipment Roller Walker   Walk 10 Feet   Type of Assistance Needed Set-up / clean-up;Adaptive equipment   Comment RW   Walk 10 Feet CARE Score 5   Walk 50 Feet with Two Turns   Type of Assistance Needed Supervision;Adaptive equipment   Comment RW   Walk 50 Feet with Two Turns CARE Score 4   Walk 150 Feet   Type of Assistance Needed Supervision;Adaptive equipment   Comment RW   Walk 150 Feet CARE Score 4   Ambulation   Primary Mode of Locomotion Prior to Admission Walk   Distance Walked (feet) 160 ft  (x2)   Assist Device Roller Walker   Gait Pattern Inconsistant Lisa;Decreased foot clearance;Forward Flexion;Wide GARO;Shuffle;Improper weight shift   Limitations Noted In Heel Strike;Posture;Safety;Speed;Strength;Swing   Does the patient walk? 2. Yes   Therapeutic Interventions   Strengthening Seated LAQ AP marching , gluts add hip ball 10-30 reps   Flexibility manual stretch LE   Assessment   Treatment Assessment pt engaged in  skilled PT for 38 min  and perform TE s has following Seated LAQ AP marching , gluts add hip ball 10-30 reps and ambulating with RW with LSO up to 160 feet with RW and then back to his room . Pt sit in his recliner and reports he sleeps in recliner at night with LE elevated. Pt legs ace wrap by nsging and cont to monitor outcome tomorrow with RW and possible advance to IRP with RW if cognition awareness is better. Cont POC   Barriers to Discharge Decreased caregiver support   PT Barriers   Physical Impairment Decreased strength;Decreased range of motion;Decreased endurance;Impaired balance;Decreased mobility;Impaired vision   Functional Limitation Car transfers;Stair negotiation;Standing;Transfers;Walking   Plan   Treatment/Interventions Functional transfer training;LE strengthening/ROM;Elevations;Therapeutic exercise;Endurance training;Cognitive reorientation;Patient/family training;Bed mobility;Gait training   Progress Progressing toward goals   Discharge Recommendation   Rehab Resource Intensity Level, PT   (home vs OP PT)   PT Therapy Minutes   PT Time In 1422   PT Time Out 1500   PT Total Time (minutes) 38   PT Mode of treatment - Individual (minutes) 38   PT Mode of treatment - Concurrent (minutes) 0   PT Mode of treatment - Group (minutes) 0   PT Mode of treatment - Co-treat (minutes) 0   PT Mode of Treatment - Total time(minutes) 38 minutes   PT Cumulative Minutes 1568   Therapy Time missed   Time missed? No

## 2025-05-04 NOTE — ASSESSMENT & PLAN NOTE
Renal following. NA dropped again today to 129- notified nephrology  Continue fluid restriction 1200 cc per 24 hours

## 2025-05-04 NOTE — ASSESSMENT & PLAN NOTE
Admitted to the trauma service after a fall with L2 vertebral body and retroperitoneal hematoma.  S/p IR embolization of distal right deep circumflex iliac artery/distal right superior gluteal artery both of which supplied an area of active extravasation and then pseudoaneurysms present at distal branches of the left inferior gluteal artery. The artery was embolized using coils and gelfoam.   He was type and screened 4/17/25 in case needed blood during HD on that day.  Hgb 4/22 improved to 8.4 and today 9.7

## 2025-05-04 NOTE — PROGRESS NOTES
"Progress Note - Hospitalist   Name: Naresh Carpio 64 y.o. male I MRN: [id number]  Unit/Bed#: -01 I Date of Admission: 4/11/2025   Date of Service: 5/4/2025 I Hospital Day: 23    Assessment & Plan  Traumatic retroperitoneal hematoma  Admitted to the trauma service after a fall with L2 vertebral body and retroperitoneal hematoma.  S/p IR embolization of distal right deep circumflex iliac artery/distal right superior gluteal artery both of which supplied an area of active extravasation and then pseudoaneurysms present at distal branches of the left inferior gluteal artery. The artery was embolized using coils and gelfoam.   He was type and screened 4/17/25 in case needed blood during HD on that day.  Hgb 4/22 improved to 8.4 and today 9.7  Anemia due to chronic kidney disease, on chronic dialysis ([facility])  Had 5 U PRBCs during hospital stay  Hemoglobin has been running mostly 7.3-7.4.    On 4/14/25 was 7.6; on 4/15/24 was 7.2 ---> Renal increased his Epogen in light of his hemoglobin  CBC Tuesday in HD 4/29/25 = 8.0 and stable  Hgb today 9.7  ESRD (end stage renal disease) ([facility])  Continue Sevelamer TID with meals  Renal following  HD T-TH-Sat while here in rehab  Paroxysmal atrial fibrillation ([facility])  On no rate control medications  Examines RRR  Anticoagulation with Eliquis as at home but he doesn't want to take it because he is worried it will make his right eye vision worse.  I d/w on call Optho Dr Diaz = \"The risk is not quantified in the literature. Dr edmond's note suggests a diagnosis of proliferative diabetic retinopathy and bilateral and recurrent vitreous hemorrhage. So he is at risk for - and has had hemorrhage - whether or not he is on eliquis (assuming dr edmond is correct).     I would always recommend life and death are more important so to do what you recommend re his systemic condition, and then to follow up with dr edmond as he recommended so a real exam can be done in the clinic\".   " "Explained all this to him and he understands  He had been taking the Eliquis again and now refusing it because he is afraid of his foot bleeding.  He says \"no point in discussing it\"; I d/w him but again refuses to take it.  He is fully aware that he may have a stroke if he does not take the Eliquis  Chronic diastolic (congestive) heart failure (HCC)  Bilateral LE edema = chronic/stable  Renal diet  Volume management via HD  Primary hypertension  Continue Procardia XL 30 mg qd  Tx per renal  Stable; no changes today  Visual disturbance  Reported a right eye \"floater\" without associated pain earlier in his hospitalization which has resolved but continued to have blurry central vision.  Ophthalmology saw here in [facility] = visual acuity without correction is 20/400 OU. Dx: Proliferative diabetic retinopathy with vitreous hemorrhage OU as well as cataracts.   Will need outpatient follow up immediately after discharge from rehab  Diabetic ulcer of left midfoot associated with type 2 diabetes mellitus, limited to breakdown of skin (HCC)  Continue local care  Being followed by WC  Last LEADS was 3/28/25 = 50-75% right prox polital and prox tibioperoneal trunk; >75% stenosis in prox popliteal artery on left  Was seen by VS in 12/2024 hospital stay and he declined any intervention at the time  Podiatry saw here on 4/22/25, s/p debridement = no surgical intervention needed at this time  Had new bleeding from foot wound 4/27/25. The bleeding then resolved. Podiatry was made aware.  Not sure this is from DM but PAD.  He has normal BSs and is not on any DM meds.  +pre-DM  Hyponatremia  Renal following. NA dropped again today to 129- notified nephrology  Continue fluid restriction 1200 cc per 24 hours  Closed fracture of proximal end of left humerus with routine healing  Occurred from a fall in February and eval'd at the time of the injury  Was to continue NWB/sling and followup with Ortho but there was no followup done  Xray 4/13/25 " "= \"Unchanged alignment of the chronic greater tuberosity avulsion fracture\".   Ortho saw here on 4/14 = WBAT; see Dr Cisneros 4 weeks @ 5/14/25  Gout  Continue Allopurinol  L2 vertebral fracture ([facility])  CT CAP 4/2: Fracture of L2 vertebral body extending to the superior endplate  Continue LSO brace  Insomnia  Patient reports not sleeping well at night because he has trouble getting comfortable. He has been trying to sleep in the recliner   PAD (peripheral artery disease) ([facility])  Vascular surgery saw per Podiatry's recommendation = they will see in the office.  They advised start ASA but after the d/w the PA in relation to his vitreous hemorrhages, can hold off on that and d/w him in the office.  This will give him time to see the Retina specialist as OP as about getting tx  Type 2 diabetes mellitus with foot ulcer, without long-term current use of insulin ([facility])  HgbA1C was 5.7 on 2/9/25  Not on medications at here/home   FBSs have been normal  On Regular diet  Hallucinations  Nursing reports he was confused and hallucinating lat night, he was seeing people in his room that weren't there.   STAT head CT- no acute findings  Nursing reports he was having pain in dialysis yesterday so he was given oxy at 930am and then again at 1030am because it hadn't improved with the first dose- may have contributed to his increased confusion  Afebrile. WBC normal  His sodium did drop again to 129- nephro following  He seems back to baseline this AM. Will continue to monitor    The above assessment and plan was reviewed and updated as determined by my evaluation of the patient on 5/4/2025.    History of Present Illness   Patient seen and examined. Patients overnight issues or events were reviewed with nursing staff. New or overnight issues include the following:   Nurse reports hallucinations last night. Back to baseline today. VSS. No fever    Review of Systems    Objective :  Temp:  [97.6 °F (36.4 °C)-98.4 °F (36.9 °C)] 98.4 °F " (36.9 °C)  HR:  [67-76] 76  BP: (138-162)/(69-78) 146/78  Resp:  [18] 18  SpO2:  [98 %-99 %] 98 %  O2 Device: None (Room air)    Invasive Devices       Hemodialysis Catheter  Duration             HD Permanent Double Catheter 613 days                    Physical Exam  General Appearance: NAD; pleasant  HEENT: PERRLA, conjuctiva normal; mucous membranes moist; face symmetrical  Neck:  Supple  Lungs: clear bilaterally, normal respiratory effort, no retractions, expiratory effort normal, on room air  CV: regular rate and rhythm, no murmurs rubs or gallops noted   ABD: soft non tender, +BS x4  EXT: DP pulses intact, no lymphadenopathy, legs swollen and wrapped  Skin: normal turgor, normal texture, no rash  Psych: affect normal, mood normal  Neuro: AAOx3    The above physical exam was reviewed and updated as determined by my evaluation of the patient on 5/4/2025.      Lab Results: I have reviewed the following results:  Results from last 7 days   Lab Units 05/04/25  0945 04/29/25  1350   WBC Thousand/uL 5.38 7.55   HEMOGLOBIN g/dL 9.7* 8.0*   HEMATOCRIT % 32.2* 26.3*   PLATELETS Thousands/uL 214 224     Results from last 7 days   Lab Units 05/04/25  0945 04/29/25  1350   SODIUM mmol/L 129* 132*   POTASSIUM mmol/L 4.2 5.5*   CHLORIDE mmol/L 95* 96   CO2 mmol/L 22 23   BUN mg/dL 24 60*   CREATININE mg/dL 4.70* 8.38*   CALCIUM mg/dL 9.3 9.2                       Review of Scheduled Meds: Medications  reviewed and reconciled as needed  Current Facility-Administered Medications   Medication Dose Route Frequency Provider Last Rate    acetaminophen  975 mg Oral Q8H [facility] Damian Cerda MD      allopurinol  100 mg Oral Daily Damian Cerda MD      apixaban  5 mg Oral BID Damian Cerda MD      atorvastatin  80 mg Oral Daily With Dinner Damian Cerda MD      bisacodyl  10 mg Rectal Daily PRN Michelle T Landa, DO      calcium carbonate  500 mg Oral Daily PRN GRANT Church      docusate sodium  100 mg Oral BID Michelle T Landa, DO       epoetin jessica  8,000 Units Intravenous After Dialysis German Linton MD      gabapentin  100 mg Oral Once per day on Monday Wednesday Friday Damian Cerda MD      lidocaine  1 patch Topical Daily PRN Damian Cerda MD      melatonin  6 mg Oral HS Jayden Rowland MD      methocarbamol  1,000 mg Oral Q8H KEMAL Jayden Rowland MD      naloxone  0.04 mg Intravenous Q1MIN PRN Damian Cerda MD      NIFEdipine  30 mg Oral After Dinner Damian Cerda MD      ondansetron  4 mg Oral Q6H PRN Gino Austin PA-C      oxyCODONE  5 mg Oral Q4H PRN Damian Cerda MD      Or    oxyCODONE  10 mg Oral Q4H PRN Damian Cerda MD      oxyCODONE  2.5 mg Oral Q8H PRN Jayden Rowland MD      polyethylene glycol  17 g Oral Daily PRN Michelle Landa,       senna  2 tablet Oral Daily With Lunch Michelle Landa,       sevelamer  1,600 mg Oral TID With Meals Damian Cerda MD      white petrolatum-mineral oil   Topical TID PRN Michelle Landa, DO         Code Status: Level 1 - Full Code  Current Length of Stay: 23 day(s)    Administrative Statements     ** Please Note:  voice to text software may have been used in the creation of this document. Although proof errors in transcription or interpretation are a potential of such software**

## 2025-05-04 NOTE — NURSING NOTE
Pt has not exhibited any hallucinations or delirium today since being placed on A/GH by therapy/ pt is ringing appropriately & has improved safety awareness compared to yesterday.  Pt remain S RW when assisting pt in room for BR  ,repositioning.

## 2025-05-04 NOTE — PLAN OF CARE
Problem: PAIN - ADULT  Goal: Verbalizes/displays adequate comfort level or baseline comfort level  Description: Interventions:- Encourage patient to monitor pain and request assistance- Assess pain using appropriate pain scale- Administer analgesics based on type and severity of pain and evaluate response- Implement non-pharmacological measures as appropriate and evaluate response- Consider cultural and social influences on pain and pain management- Notify physician/advanced practitioner if interventions unsuccessful or patient reports new pain  Outcome: Progressing     Problem: INFECTION - ADULT  Goal: Absence or prevention of progression during hospitalization  Description: INTERVENTIONS:- Assess and monitor for signs and symptoms of infection- Monitor lab/diagnostic results- Monitor all insertion sites, i.e. indwelling lines, tubes, and drains- Monitor endotracheal if appropriate and nasal secretions for changes in amount and color- [place] appropriate cooling/warming therapies per order- Administer medications as ordered- Instruct and encourage patient and family to use good hand hygiene technique- Identify and instruct in appropriate isolation precautions for identified infection/condition  Outcome: Progressing  Goal: Absence of fever/infection during neutropenic period  Description: INTERVENTIONS:- Monitor WBC  Outcome: Progressing     Problem: SAFETY ADULT  Goal: Patient will remain free of falls  Description: INTERVENTIONS:- Educate patient/family on patient safety including physical limitations- Instruct patient to call for assistance with activity - Consult OT/PT to assist with strengthening/mobility - Keep Call bell within reach- Keep bed low and locked with side rails adjusted as appropriate- Keep care items and personal belongings within reach- Initiate and maintain comfort rounds- Make Fall Risk Sign visible to staff- Offer Toileting every 2 Hours, in advance of need- Initiate/Maintain alarm-  Obtain necessary fall risk management equipment: - Apply yellow socks and bracelet for high fall risk patients- Consider moving patient to room near nurses station  Outcome: Progressing  Goal: Maintain or return to baseline ADL function  Description: INTERVENTIONS:-  Assess patient's ability to carry out ADLs; assess patient's baseline for ADL function and identify physical deficits which impact ability to perform ADLs (bathing, care of mouth/teeth, toileting, grooming, dressing, etc.)- Assess/evaluate cause of self-care deficits - Assess range of motion- Assess patient's mobility; develop plan if impaired- Assess patient's need for assistive devices and provide as appropriate- Encourage maximum independence but intervene and supervise when necessary- Involve family in performance of ADLs- Assess for home care needs following discharge - Consider OT consult to assist with ADL evaluation and planning for discharge- Provide patient education as appropriate  Outcome: Progressing  Goal: Maintains/Returns to pre admission functional level  Description: INTERVENTIONS:- Perform AM-PAC 6 Click Basic Mobility/ Daily Activity assessment daily.- Set and communicate daily mobility goal to care team and patient/family/caregiver. - Collaborate with rehabilitation services on mobility goals if consulted- Perform Range of Motion 3 times a day.- Reposition patient every 2 hours.- Dangle patient 3 times a day- Stand patient 3 times a day- Ambulate patient 3 times a day- Out of bed to chair 3 times a day - Out of bed for meals 3 times a day- Out of bed for toileting- Record patient progress and toleration of activity level   Outcome: Progressing     Problem: DISCHARGE PLANNING  Goal: Discharge to home or other facility with appropriate resources  Description: INTERVENTIONS:- Identify barriers to discharge w/patient and caregiver- Arrange for needed discharge resources and transportation as appropriate- Identify discharge learning  needs (meds, wound care, etc.)- Arrange for interpretive services to assist at discharge as needed- Refer to Case Management Department for coordinating discharge planning if the patient needs post-hospital services based on physician/advanced practitioner order or complex needs related to functional status, cognitive ability, or social support system  Outcome: Progressing     Problem: Prexisting or High Potential for Compromised Skin Integrity  Goal: Skin integrity is maintained or improved  Description: INTERVENTIONS:- Identify patients at risk for skin breakdown- Assess and monitor skin integrity- Assess and monitor nutrition and hydration status- Monitor labs - Assess for incontinence - Turn and reposition patient- Assist with mobility/ambulation- Relieve pressure over bony prominences- Avoid friction and shearing- Provide appropriate hygiene as needed including keeping skin clean and dry- Evaluate need for skin moisturizer/barrier cream- Collaborate with interdisciplinary team - Patient/family teaching- Consider wound care consult   Outcome: Progressing     Problem: METABOLIC, FLUID AND ELECTROLYTES - ADULT  Goal: Electrolytes maintained within normal limits  Description: INTERVENTIONS:- Monitor labs and assess patient for signs and symptoms of electrolyte imbalances- Administer electrolyte replacement as ordered- Monitor response to electrolyte replacements, including repeat lab results as appropriate- Instruct patient on fluid and nutrition as appropriate  Outcome: Progressing  Goal: Fluid balance maintained  Description: INTERVENTIONS:- Monitor labs - Monitor I/O and WT- Instruct patient on fluid and nutrition as appropriate- Assess for signs & symptoms of volume excess or deficit  Outcome: Progressing     Problem: Nutrition/Hydration-ADULT  Goal: Nutrient/Hydration intake appropriate for improving, restoring or maintaining nutritional needs  Description: Monitor and assess patient's nutrition/hydration  status for malnutrition. Collaborate with interdisciplinary team and initiate plan and interventions as ordered.  Monitor patient's weight and dietary intake as ordered or per policy. Utilize nutrition screening tool and intervene as necessary. Determine patient's food preferences and provide high-protein, high-caloric foods as appropriate. INTERVENTIONS:- Monitor oral intake, urinary output, labs, and treatment plans- Assess nutrition and hydration status and recommend course of action- Evaluate amount of meals eaten- Assist patient with eating if necessary - Allow adequate time for meals- Recommend/ encourage appropriate diets, oral nutritional supplements, and vitamin/mineral supplements- Order, calculate, and assess calorie counts as needed- Recommend, monitor, and adjust tube feedings and TPN/PPN based on assessed needs- Assess need for intravenous fluids- Provide specific nutrition/hydration education as appropriate- Include patient/family/caregiver in decisions related to nutrition  Outcome: Progressing     Problem: Nutrition/Hydration-ADULT  Goal: Nutrient/Hydration intake appropriate for improving, restoring or maintaining nutritional needs  Description: Monitor and assess patient's nutrition/hydration status for malnutrition. Collaborate with interdisciplinary team and initiate plan and interventions as ordered.  Monitor patient's weight and dietary intake as ordered or per policy. Utilize nutrition screening tool and intervene as necessary. Determine patient's food preferences and provide high-protein, high-caloric foods as appropriate. INTERVENTIONS:- Monitor oral intake, urinary output, labs, and treatment plans- Assess nutrition and hydration status and recommend course of action- Evaluate amount of meals eaten- Assist patient with eating if necessary - Allow adequate time for meals- Recommend/ encourage appropriate diets, oral nutritional supplements, and vitamin/mineral supplements- Order, calculate,  and assess calorie counts as needed- Recommend, monitor, and adjust tube feedings and TPN/PPN based on assessed needs- Assess need for intravenous fluids- Provide specific nutrition/hydration education as appropriate- Include patient/family/caregiver in decisions related to nutrition  Outcome: Progressing

## 2025-05-04 NOTE — OCCUPATIONAL THERAPY NOTE
"Pt seen for skilled OT session this AM. Per chart review and report from CURLY Harding, it is known that pt had an \"off\" day yesterday. Did not perform up to his normal standards in PT on Saturday and had charted behaviors yesterday after HD. This OT determined that  reported behaviors are a serious risk to his safety while mobilizing in the room. Therefore, in-room privileges revoked at this time.     Reported that pt was found mobilizing in BR yesterday \"rearranging,\" moving commode and using it has his mobility device vs the RW which was also in the BR w/ him. When OT asked pt about yesterday, he endorses, \"Yesterday was bad. I was so off. I was half asleep I don't know what I was doing.\" Upon asking pt for more details he quickly cuts therapist off, \"I don't want to talk about it anymore.\" OT then informs pt that if this can't be discussed and problem solved, OT needs to reapply bed/chair alarms and pt to ring for assist from staff for mobility and toileting. Pt states, \"I guess that is what we will have to do.\" Pt's room does appear a bit disheveled this AM. BSC is now in the room. LSO laying on floor on opposite side of pts bed. Objects on floor around recliner.     Despite yesterdays performance and behaviors, pt largely appears near his recent baseline during OT session. Refer to separate OT notes for details on pt's performance. Reviewed the above w/ CURLY Harding and PCA Mario. Enouraged them and all staff who assist Ed to score him as his performance indicates, which may be Mod I even though he does not have IRP at this time. Encouraged RN to report on any behaviors or lack thereof, so that therapy can potentially reassess for IRP in upcoming sessions.  "

## 2025-05-04 NOTE — PROGRESS NOTES
05/04/25 1230   Pain Assessment   Pain Assessment Tool 0-10   Pain Score 5   Pain Location/Orientation Location: Back   Restrictions/Precautions   Precautions Bed/chair alarms;Cognitive;Fall Risk;Pain;Spinal precautions;Visual deficit   ROM Restrictions Yes   Braces or Orthoses LSO  (spinal precautions)   Cognition   Arousal/Participation Cooperative   Subjective   Subjective Pt reports feeling better than yesterday but tired   Sit to Stand   Type of Assistance Needed Supervision;Adaptive equipment   Comment RW   Sit to Stand CARE Score 4   Bed-Chair Transfer   Type of Assistance Needed Supervision;Adaptive equipment   Comment RW   Chair/Bed-to-Chair Transfer CARE Score 4   Car Transfer   Type of Assistance Needed Adaptive equipment;Supervision   Comment RW   Car Transfer CARE Score 4   Walk 10 Feet   Type of Assistance Needed Adaptive equipment;Supervision   Comment RW   Walk 10 Feet CARE Score 4   Walk 50 Feet with Two Turns   Type of Assistance Needed Adaptive equipment;Supervision   Comment RW   Walk 50 Feet with Two Turns CARE Score 4   Walk 150 Feet   Type of Assistance Needed Adaptive equipment;Supervision   Comment RW   Walk 150 Feet CARE Score 4   Ambulation   Primary Mode of Locomotion Prior to Admission Walk   Distance Walked (feet) 150 ft   Assist Device Roller Walker   Gait Pattern Inconsistant Lisa;Step through;Improper weight shift;Wide GARO   Limitations Noted In Endurance;Safety;Sequencing;Speed;Strength;Swing   Walk Assist Level Close Supervision   Does the patient walk? 2. Yes   Curb or Single Stair   Style negotiated Curb   Type of Assistance Needed Incidental touching   Comment CGA with RW on curb step x2 with mild LOB on second attempt asc due to fatigue   1 Step (Curb) CARE Score 4   4 Steps   Type of Assistance Needed Incidental touching   4 Steps CARE Score 4   12 Steps   Type of Assistance Needed Incidental touching   12 Steps CARE Score 4   Stairs   Type Stairs   # of Steps 12  "  Assist Devices Single Rail   Findings B/L UE support on L HR to asc 12-6\" steps. 1 step with folding RW and using in R UE to ascend/descend per PLOF   Assessment   Treatment Assessment Pt participated in 60 min PT session to assess mobility deficits. Pt on bed/chair alarms after increased confusion and decrease in safety awareness yesterday while attempting to mobilize in his room. Pt no longer has IRP. Pt able to ambulate 150ft with RW with close supervision today. Pt negotiated 12-6\" steps with b/l UE support on L HR and 1 step with L UE support on HR and R UE support on folded RW. Pt negotiated 2-6\" curb steps with RW, demonstrating safety with CS on first step but slight posterior LOB when attempting to ascend second step requiring CGA to steady. S/p stair negotiation, pt's R foot was notably bleeding through bandage and sock. Pt returned to room via WC with RN notified of requiring new dressing change. Improved participation and safety noted with mobility today. Per pt report, he is not ready to d/c home on Monday as he does not feel safe and needs increased assistance.   Problem List Decreased strength;Decreased endurance;Decreased mobility;Impaired balance;Decreased safety awareness   Barriers to Discharge Decreased caregiver support;Inaccessible home environment   PT Barriers   Physical Impairment Decreased strength;Decreased endurance;Impaired balance;Decreased mobility;Decreased safety awareness;Pain   Functional Limitation Car transfers;Stair negotiation;Standing;Transfers;Walking   Plan   Treatment/Interventions LE strengthening/ROM;Functional transfer training;Elevations;Therapeutic exercise;Endurance training;Patient/family training;Bed mobility;Gait training   Progress Progressing toward goals   PT Therapy Minutes   PT Time In 1230   PT Time Out 1330   PT Total Time (minutes) 60   PT Mode of treatment - Individual (minutes) 60   PT Mode of treatment - Concurrent (minutes) 0   PT Mode of treatment - " Group (minutes) 0   PT Mode of treatment - Co-treat (minutes) 0   PT Mode of Treatment - Total time(minutes) 60 minutes   PT Cumulative Minutes 1590   Therapy Time missed   Time missed? No

## 2025-05-05 LAB
ANION GAP SERPL CALCULATED.3IONS-SCNC: 11 MMOL/L (ref 4–13)
BUN SERPL-MCNC: 31 MG/DL (ref 5–25)
CALCIUM SERPL-MCNC: 9 MG/DL (ref 8.4–10.2)
CHLORIDE SERPL-SCNC: 95 MMOL/L (ref 96–108)
CO2 SERPL-SCNC: 24 MMOL/L (ref 21–32)
CREAT SERPL-MCNC: 6.09 MG/DL (ref 0.6–1.3)
GFR SERPL CREATININE-BSD FRML MDRD: 8 ML/MIN/1.73SQ M
GLUCOSE P FAST SERPL-MCNC: 81 MG/DL (ref 65–99)
GLUCOSE SERPL-MCNC: 81 MG/DL (ref 65–140)
POTASSIUM SERPL-SCNC: 4.1 MMOL/L (ref 3.5–5.3)
SODIUM SERPL-SCNC: 130 MMOL/L (ref 135–147)

## 2025-05-05 PROCEDURE — 99232 SBSQ HOSP IP/OBS MODERATE 35: CPT | Performed by: NURSE PRACTITIONER

## 2025-05-05 PROCEDURE — 97530 THERAPEUTIC ACTIVITIES: CPT

## 2025-05-05 PROCEDURE — 99232 SBSQ HOSP IP/OBS MODERATE 35: CPT | Performed by: INTERNAL MEDICINE

## 2025-05-05 PROCEDURE — 99233 SBSQ HOSP IP/OBS HIGH 50: CPT | Performed by: STUDENT IN AN ORGANIZED HEALTH CARE EDUCATION/TRAINING PROGRAM

## 2025-05-05 PROCEDURE — 97110 THERAPEUTIC EXERCISES: CPT

## 2025-05-05 PROCEDURE — 80048 BASIC METABOLIC PNL TOTAL CA: CPT | Performed by: PHYSICIAN ASSISTANT

## 2025-05-05 RX ADMIN — ATORVASTATIN CALCIUM 80 MG: 80 TABLET, FILM COATED ORAL at 17:05

## 2025-05-05 RX ADMIN — ALLOPURINOL 100 MG: 100 TABLET ORAL at 08:20

## 2025-05-05 RX ADMIN — METHOCARBAMOL 1000 MG: 500 TABLET ORAL at 05:17

## 2025-05-05 RX ADMIN — DOCUSATE SODIUM 100 MG: 100 CAPSULE, LIQUID FILLED ORAL at 08:20

## 2025-05-05 RX ADMIN — SEVELAMER HYDROCHLORIDE 1600 MG: 800 TABLET ORAL at 17:05

## 2025-05-05 RX ADMIN — Medication 6 MG: at 21:26

## 2025-05-05 RX ADMIN — SEVELAMER HYDROCHLORIDE 1600 MG: 800 TABLET ORAL at 08:20

## 2025-05-05 RX ADMIN — ACETAMINOPHEN 975 MG: 325 TABLET, FILM COATED ORAL at 21:26

## 2025-05-05 RX ADMIN — METHOCARBAMOL 1000 MG: 500 TABLET ORAL at 21:26

## 2025-05-05 RX ADMIN — ACETAMINOPHEN 975 MG: 325 TABLET, FILM COATED ORAL at 14:46

## 2025-05-05 RX ADMIN — GABAPENTIN 100 MG: 100 CAPSULE ORAL at 08:20

## 2025-05-05 RX ADMIN — DOCUSATE SODIUM 100 MG: 100 CAPSULE, LIQUID FILLED ORAL at 17:05

## 2025-05-05 RX ADMIN — SENNOSIDES 17.2 MG: 8.6 TABLET, FILM COATED ORAL at 11:10

## 2025-05-05 RX ADMIN — SEVELAMER HYDROCHLORIDE 1600 MG: 800 TABLET ORAL at 11:10

## 2025-05-05 RX ADMIN — ACETAMINOPHEN 975 MG: 325 TABLET, FILM COATED ORAL at 05:13

## 2025-05-05 RX ADMIN — NIFEDIPINE 30 MG: 30 TABLET, FILM COATED, EXTENDED RELEASE ORAL at 17:05

## 2025-05-05 RX ADMIN — METHOCARBAMOL 1000 MG: 500 TABLET ORAL at 14:45

## 2025-05-05 NOTE — ASSESSMENT & PLAN NOTE
Nursing reported he was confused/hallucinating late night Saturday into Sunday.  He was seeing people in his room that weren't there.   STAT head CT 5/3/25: no acute findings  Nursing reported he was having pain in dialysis on 5/3/25 so he was given OxyIR at 0930  and then again at 1030 because it hadn't improved with the first dose.  This may have contributed to his increased confusion  Has been afebrile and WBC normal  Back to baseline currently

## 2025-05-05 NOTE — PROGRESS NOTES
"OT daily treatment note       05/05/25 1230   Pain Assessment   Pain Assessment Tool 0-10   Pain Score No Pain   Restrictions/Precautions   Precautions Fall Risk;Pain;Pressure Ulcer;Spinal precautions  (daytime IRP w/ RW)   Braces or Orthoses LSO   Lifestyle   Autonomy \"I wont go home. You guys cant make me.\"   Sit to Stand   Type of Assistance Needed Independent   Physical Assistance Level No physical assistance   Comment RW   Sit to Stand CARE Score 6   Bed-Chair Transfer   Type of Assistance Needed Independent   Physical Assistance Level No physical assistance   Comment RW   Chair/Bed-to-Chair Transfer CARE Score 6   Commmunity Re-entry   Community Re-entry Level Walker   Community Re-entry Level of Assistance Independent   Community Re-entry Pt participated in functional mobility task with use of RW to simulate home and community distances required for ADL/IADL completion. Task focused on increasing functional activity tolerance, endurance, strength and dynamic standing balance to improve independence with ADL completion.   Cognition   Overall Cognitive Status WFL   Arousal/Participation Cooperative   Attention Attends with cues to redirect   Orientation Level Oriented X4   Memory Decreased short term memory;Decreased recall of recent events;Decreased recall of precautions   Following Commands Follows one step commands with increased time or repetition   Activity Tolerance   Activity Tolerance Patient tolerated treatment well   Assessment   Treatment Assessment Pt participated in skilled OT session focusing on DC planning, endurance and community reintegration. Upon arrival, OT opened up discussion asking pt to set goals for therapy for today and tomorrow in prep for a potential DC Wed pending CM obtaining [facility]. Pt became agitated stating he can't DC because dialysis transportation has not been setup yet. OT reminded pt of conversation pt had with ELOISE Armstrong and CHRISTIANO on Friday where pt stated his sister could drive " "him initially. Pt began yelling stating he never said his sister could drive him and he refuses to leave until transportation is setup. Pt stated \"I won't go home, you guys can't make me.\" OT provided emotional support and again re-edu pt of family mtg last week where ELOISE Armstrong explained needing to use a discounted taxi service to dialysis as she was unable to locate transportation that [place] cross county lines. Pt acknowledged that conversation but stated he was under the impression that that option was a last resort and Patti would continue to work on obtaining transportation. OT then s/w ELOISE Patti and CHRISTIANO together, informing them of the above mentioned. They stated they would s/w the pt later this afternoon to discuss the DC plan. Upon arrival back to pts room, he requested that OT call the pts sister because he wants her to have ample notice of DC. OT placed call to Diana, with pt present and he informed her of the DC plan Wednesday. Diana had a variety of requests including, the name and phone number of his [facility] nurses and therapists & the days/times they will be at the pts house; his method of transportation to and from dialysis, a phone call from the team when they come to s/w the pt this afternoon and a 4pm DC time on Wed to allow her to pick the pt up and help him settle in at home. OT educated that [facility] was not yet set up yet and even when its setup, information on specific people/dates/times cannot be provided as the [facility] agency will call to setup the initial appt. Diana then stated \"well that's what you say. What if they just never show up?\" OT educated that nursing will likely be in to see the pt within 48 hrs of his return home. Diana was agreeable to that. Re: transportation <> dialysis, Diana she felt that ELOISE was not \"working hard enough\" to locate transportation as there is company called [employer] that would be able to transport him. OT reminded Diana of discussion had during family mtg last wk that ELOISE " "has exhausted all options but was able to secure a discounted taxi ride that would be reviewed with the pt later today. Diana then requested to be called when the team s/w the pt. OT passed this information along to the team. Diana was supportive of the pt DC home Wed pending all the necessary things being setup for him to be successful at home. OT provided support and acknowledged her concerns and continue to support the fact that CM has been working on all this and he will not DC until it is setup. Pt did also appear to be more calm after s/w his sister but continues to state he is not ready to DC home yet. When OT asked pt about his concerns for DC home he stated his concerns are for his vision and his ability to navigate the stairs. When walking around the unit with OT, pt made a lot of statements regarding him being a fall risk. Pt stated \"I'm not steady on my feet... my balance is horrible, dont you see... I could fall at anytime\". Based on this conversation, OT discussed with the pt that if he is that concerns about his stability and risk for falls, then his IRP should be removed and the pt should continue to call for assistance to ensure his safety. Pt stated \"No, I have no problem with my walking. I want to keep my privileges. I am more concerned about being able to go up the stairs. You havent seen my home setup.\" OT edu the importance of HH therapies that can assist with managing his home environment in a safe way. Pt verbalized understanding. OT clarified with pt that he wants to keep his IRP and he stated yes that \"walking is not the problem\". OT then inquired about any additional concerns and pt stated his vision which OT reminded him about needing to be home to get see a retina specialist. Pt requested a retina specialist in his area - OT educated pt that OT provided his sister with a list of all retina specialists within 25 miles of his home. Pt was appreciative of this.  At this point, the plan for " DC ADL tomorrow was discussed which pt was agreeable to. Pt would benefit from cont skilled OT services to inc independence and safety w/ ADLs in prep for DC home on Wed w/ HH PT/OT/RN services.   Prognosis Good   Problem List Decreased strength;Decreased endurance;Decreased mobility;Impaired balance;Decreased safety awareness   Barriers to Discharge Decreased caregiver support   Plan   Treatment/Interventions ADL retraining;Functional transfer training;Therapeutic exercise;Endurance training;Cognitive reorientation;Patient/family training;Equipment eval/education;Compensatory technique education   Progress Progressing toward goals   OT Therapy Minutes   OT Time In 1230   OT Time Out 1340   OT Total Time (minutes) 70   OT Mode of treatment - Individual (minutes) 70   OT Mode of treatment - Concurrent (minutes) 0   OT Mode of treatment - Group (minutes) 0   OT Mode of treatment - Co-treat (minutes) 0   OT Mode of Treatment - Total time(minutes) 70 minutes   OT Cumulative Minutes 1660

## 2025-05-05 NOTE — ASSESSMENT & PLAN NOTE
5/5- Over the weekend patient was having hallucinations and irregular behavior. MAR reviewed and no new medications were started. Back to baseline this morning. Symptoms likely due to hospital-acquired delirium from prolonged stay, frequent oxycodone use and overall poor sleep     - Monitor for any recurrence

## 2025-05-05 NOTE — ASSESSMENT & PLAN NOTE
> 4/14: Hgb 7.6  > 4/15: Hgb 7.2 - Discussed with IM who is coordinating with Nephrology; they will increase his Epoetin jessica  > 4/17: Hgb 7.7  > 4/29: Hgb 8.0  > Hgb 9.7 on 5/4 labs    - Trend Hgb on CBC  - Epoetin jessica with dialysis  - Transfusions per IM, appreciate their recommendations

## 2025-05-05 NOTE — ASSESSMENT & PLAN NOTE
Patient complains of SOB she has history of COPD and chronic bronchitis and thinks this is a flare up that started about a week ago. No fever or COVID Exposure. Rib cage pain. Patient reports he has an overall a poor sleep hygiene: he sleeps at 1-2am nightly and watches TV up until bed.  Advised patient to attempt to regulate sleep/wake cycle while here to better participate in therapy. Attempt to try to sleep earlier around midnight  >4/15: reviewed sleep log: Pt went to sleep around midnight and slept for 6 hours.  > 4/17- Poor sleep last night: was awake every other hour. Requiring nap during the day. Can increase melatonin tomorrow if this persists  Pt endorses moving from bed to chair multiple times throughout the night which is his baseline at home too    - Continue sleep log  - Continue melatonin 6mg nightly

## 2025-05-05 NOTE — ASSESSMENT & PLAN NOTE
Continue local care  Being followed by WC  Last LEADS was 3/28/25 = 50-75% right prox polital and prox tibioperoneal trunk; >75% stenosis in prox popliteal artery on left  Was seen by VS in 12/2024 hospital stay and he declined any intervention at the time  Podiatry saw here on 4/22/25, s/p debridement = no surgical intervention needed at this time  Had new bleeding from foot wound 4/27/25. The bleeding then resolved. Podiatry was made aware.  Not sure wound is actually from DM but more likely PAD.  He has normal BSs here and is not on any DM meds.  +pre-DM PTA

## 2025-05-05 NOTE — ASSESSMENT & PLAN NOTE
"- Reporting \"floater\" within the right eye with acute onset on 4/11/25 at mid-day while watching TV; describes a dark, \"spider\"-shaped floater that occupies a significant portion of the visual field, painless; has similar floaters in the left eye but smaller  - Ophthalmology consulted on [facility] admission by IM, per prior trauma surgery documentation, Dr. Chaudhry of ophthalmology was made personally aware of clinical situation on 4/11/25 prior to [facility] admission  > Ophthalmology consult 4/12: Exam c/w proliferative diabetic retinopathy with vitreous hemorrhage. Pt will follow up with ophtho immediately after discharge and for referral to a retina specialist  > 4/15- Pt thinks his R eye floater may be larger today, unable to clearly state or quantify symptoms. No other visual complaints of increased number of floaters, double/blurry vision, or bright flashes. Advised patient to inform us if symptoms change - will reach out to Ophthalmology accordingly  > 4/18- No changes noted in vision/floaters. His overall impaired vision continues to be a barrier for his ADLs/iADLs  > 4/20-24- Stable R eye floater    - Monitor for changes in vision  "

## 2025-05-05 NOTE — PROGRESS NOTES
"Progress Note - Hospitalist   Name: Naresh Carpio 64 y.o. male I MRN: [id number]  Unit/Bed#: -01 I Date of Admission: 4/11/2025   Date of Service: 5/5/2025 I Hospital Day: 24    Assessment & Plan  Traumatic retroperitoneal hematoma  Admitted to the trauma service after a fall with L2 vertebral body and retroperitoneal hematoma.  S/p IR embolization of distal right deep circumflex iliac artery/distal right superior gluteal artery both of which supplied an area of active extravasation and then pseudoaneurysms present at distal branches of the left inferior gluteal artery. The artery was embolized using coils and gelfoam.   He was type and screened 4/17/25 in case needed blood during HD on that day.  Hgb on 5/4/25 was 9.7  Anemia due to chronic kidney disease, on chronic dialysis ([facility])  Had 5 U PRBCs during hospital stay  On  4/15/24 was 7.2 ---> Renal increased his Epogen  CBC 4/29/25 = 8.0/stable  Hgb improved and on 5/4/25 was up to 9.7  ESRD (end stage renal disease) ([facility])  Continue Sevelamer TID with meals  Renal following  HD T-TH-Sat while here in rehab  Paroxysmal atrial fibrillation ([facility])  On no rate control medications  Examines RRR  Anticoagulation with Eliquis as at home but he doesn't want to take it because he is worried it will make his right eye vision worse.  I d/w on call Optho Dr Diaz = \"The risk is not quantified in the literature. Dr edmond's note suggests a diagnosis of proliferative diabetic retinopathy and bilateral and recurrent vitreous hemorrhage. So he is at risk for - and has had hemorrhage - whether or not he is on eliquis (assuming dr edmond is correct).     I would always recommend life and death are more important so to do what you recommend re his systemic condition, and then to follow up with dr edmond as he recommended so a real exam can be done in the clinic\".   Explained all this to him and he understands  He had been taking the Eliquis again and now refusing it " "because he is afraid of his foot bleeding.  He says \"no point in discussing it\"; I d/w him but again refuses to take it.  He is fully aware that he may have a stroke if he does not take the Eliquis  He did take 1 dose 5/3/25 and both doses 5/4/25  Chronic diastolic (congestive) heart failure (HCC)  Bilateral LE edema = chronic/stable  Renal diet  Volume management via HD  Primary hypertension  Continue Procardia XL 30 mg qd  Tx per renal  Stable; no changes today 5/5/25  Visual disturbance  Reported a right eye \"floater\" without associated pain earlier in his hospitalization which has resolved but continued to have blurry central vision.  Ophthalmology saw here in [facility] = visual acuity without correction is 20/400 OU. Dx: Proliferative diabetic retinopathy with vitreous hemorrhage OU as well as cataracts.   Will need outpatient follow up immediately after discharge from rehab  Diabetic ulcer of left midfoot associated with type 2 diabetes mellitus, limited to breakdown of skin (HCC)  Continue local care  Being followed by WC  Last LEADS was 3/28/25 = 50-75% right prox polital and prox tibioperoneal trunk; >75% stenosis in prox popliteal artery on left  Was seen by VS in 12/2024 hospital stay and he declined any intervention at the time  Podiatry saw here on 4/22/25, s/p debridement = no surgical intervention needed at this time  Had new bleeding from foot wound 4/27/25. The bleeding then resolved. Podiatry was made aware.  Not sure wound is actually from DM but more likely PAD.  He has normal BSs here and is not on any DM meds.  +pre-DM PTA  Hyponatremia  Per Nephrology  Seems to chronically run 129-130  Continue fluid restriction 1200 cc per 24 hours  Closed fracture of proximal end of left humerus with routine healing  Occurred from a fall in February and eval'd at the time of the injury  Was to continue NWB/sling and followup with Ortho but there was no followup done  Xray 4/13/25 = \"Unchanged alignment of the " "chronic greater tuberosity avulsion fracture\".   Ortho saw here on 4/14 = WBAT; see Dr Cisneros 4 weeks @ 5/14/25  Gout  Continue Allopurinol  L2 vertebral fracture ([facility])  CT CAP 4/2: Fracture of L2 vertebral body extending to the superior endplate  Continue LSO brace  Insomnia  Patient reports not sleeping well at night because he has trouble getting comfortable. He has been trying to sleep in the recliner   PAD (peripheral artery disease) ([facility])  Vascular surgery saw per Podiatry's recommendation = they will see in the office.  They advised start ASA but after the d/w the PA in relation to his vitreous hemorrhages, can hold off on that and d/w him in the office.  This will give him time to see the Retina specialist as OP as about getting tx  Type 2 diabetes mellitus with foot ulcer, without long-term current use of insulin ([facility])  HgbA1C was 5.7 on 2/9/25  Not on medications at here/home   FBSs have been normal  On Regular diet  Hallucinations  Nursing reported he was confused/hallucinating late night Saturday into Sunday.  He was seeing people in his room that weren't there.   STAT head CT 5/3/25: no acute findings  Nursing reported he was having pain in dialysis on 5/3/25 so he was given OxyIR at 0930  and then again at 1030 because it hadn't improved with the first dose.  This may have contributed to his increased confusion  Has been afebrile and WBC normal  Back to baseline currently    The above assessment and plan was reviewed and updated as determined by my evaluation of the patient on 5/5/2025.    History of Present Illness   Patient seen and examined. Patients overnight issues or events were reviewed with nursing staff. New or overnight issues include the following:   No new or overnight issues.  Offers no complaints    Review of Systems   All other systems reviewed and are negative.      Objective :  Temp:  [97.6 °F (36.4 °C)-98.4 °F (36.9 °C)] 97.6 °F (36.4 °C)  HR:  [63-76] 70  BP: (146-165)/(61-78) " 165/75  Resp:  [16-18] 17  SpO2:  [98 %-99 %] 98 %  O2 Device: None (Room air)    Invasive Devices       Hemodialysis Catheter  Duration             HD Permanent Double Catheter 614 days                    Physical Exam  General Appearance: no distress, non toxic appearing  HEENT: PERRLA, conjuctiva normal; oropharynx clear; mucous membranes moist   Neck:  Supple, normal ROM  Lungs: CTA, normal respiratory effort, no retractions, expiratory effort normal  CV: regular rate and rhythm; no rubs/murmurs/gallops, PMI normal   ABD: soft; ND/NT; +BS  EXT: +LE edema  Skin: normal turgor, normal texture  Psych: affect normal, mood normal  Neuro: AAO        The above physical exam was reviewed and updated as determined by my evaluation of the patient on 5/5/2025.      Lab Results: I have reviewed the following results:  Results from last 7 days   Lab Units 05/04/25  0945 04/29/25  1350   WBC Thousand/uL 5.38 7.55   HEMOGLOBIN g/dL 9.7* 8.0*   HEMATOCRIT % 32.2* 26.3*   PLATELETS Thousands/uL 214 224     Results from last 7 days   Lab Units 05/05/25  0532 05/04/25  0945   SODIUM mmol/L 130* 129*   POTASSIUM mmol/L 4.1 4.2   CHLORIDE mmol/L 95* 95*   CO2 mmol/L 24 22   BUN mg/dL 31* 24   CREATININE mg/dL 6.09* 4.70*   CALCIUM mg/dL 9.0 9.3                   Imaging Results Review: No pertinent imaging studies reviewed.  Other Study Results Review: No additional pertinent studies reviewed.    Review of Scheduled Meds: Medications  reviewed and reconciled as needed  Current Facility-Administered Medications   Medication Dose Route Frequency Provider Last Rate    acetaminophen  975 mg Oral Q8H [facility] Damian Cerda MD      allopurinol  100 mg Oral Daily Damian Cerda MD      apixaban  5 mg Oral BID Damian Cerda MD      atorvastatin  80 mg Oral Daily With Dinner Damian Cerda MD      bisacodyl  10 mg Rectal Daily PRN Michelle Landa,       calcium carbonate  500 mg Oral Daily PRN GRANT Church      docusate sodium  100 mg  Oral BID Michelle T Cordell, DO      epoetin jessica  8,000 Units Intravenous After Dialysis German Linton MD      gabapentin  100 mg Oral Once per day on Monday Wednesday Friday Damian Cerda MD      lidocaine  1 patch Topical Daily PRN Damian Cerda MD      melatonin  6 mg Oral HS Jayden Rowland MD      methocarbamol  1,000 mg Oral Q8H KEMAL Jayden Rowland MD      naloxone  0.04 mg Intravenous Q1MIN PRN Damian Cerda MD      NIFEdipine  30 mg Oral After Dinner Damian Cerda MD      ondansetron  4 mg Oral Q6H PRN Gino Austin PA-C      oxyCODONE  5 mg Oral Q4H PRN Damian Cerda MD      Or    oxyCODONE  10 mg Oral Q4H PRN Damian Cerda MD      oxyCODONE  2.5 mg Oral Q8H PRN Jayden Rowland MD      polyethylene glycol  17 g Oral Daily PRN Michelle Landa, DO      senna  2 tablet Oral Daily With Lunch Michelle Landa, DO      sevelamer  1,600 mg Oral TID With Meals Damian Cerda MD      white petrolatum-mineral oil   Topical TID PRN Michelle Landa,          VTE Pharmacologic Prophylaxis: Eliquis  Code Status: Level 1 - Full Code  Current Length of Stay: 24 day(s)    Administrative Statements     ** Please Note:  voice to text software may have been used in the creation of this document. Although proof errors in transcription or interpretation are a potential of such software**

## 2025-05-05 NOTE — ASSESSMENT & PLAN NOTE
- Sustained from mechanical fall several days prior to initial presentation on 4/2/25, presenting with progressively worsening low back pain. Trauma imaging revealed a large retroperitoneal hematoma with active extravasation and hemoglobin drop. Now s/p transfusion, Kcentra and ultimately IR embolization of the right deep circumflex iliac artery and superior gluteal artery with Dr. Garcia on 4/3/25. Unfortunately, on 4/6/25, his hemoglobin decreased and repeat CT showed a 5 mm inferior gluteal artery branch pseudoaneurysm and he is s/p IR pelvic angiogram on 4/7/25 with Dr. Hernandez, which demonstrated pseudoaneurysms at the distal branches of the left inferior gluteal artery which was embolized.   > 4/15: Hgb 7.2 today (from 7.6 yesterday). Back pain seems controlled. Will be receiving increased Epogen today per IM/Nephro  > Hgb 9.7 on 5/4 labs    - Continue to trend Hgb on CBC closely  - Consider repeat imaging if Hgb continues to downtrend or pain significantly worsens  - Contact IR if there are additional bleeding concerns given multiple prior interventions described above  - Local right hip/groin access site incision care  - Analgesia, see below  - PT and OT

## 2025-05-05 NOTE — PROGRESS NOTES
Progress Note - Nephrology   Name: Naresh Carpio 64 y.o. male I MRN: [id number]  Unit/Bed#: -01 I Date of Admission: 4/11/2025   Date of Service: 5/5/2025 I Hospital Day: 24     Assessment & Plan  ESRD (end stage renal disease) (HCC)    Patient is end-stage renal disease hemodialysis is Tuesday Thursday Saturday.  The patient's last dialysis was Saturday he will be due tomorrow.  Still has edema clinically and unsure if we will be able to remove more volume but will attempt on dialysis.    Labs reviewed today he is hyponatremia sodium concentration is 130 mmol/L potassium is 4.1.    Hemodialysis Tuesday Thursday Saturday    Anemia due to chronic kidney disease, on chronic dialysis (HCC)    Hemoglobin yesterday 9.7 on Epogen with dialysis.    Traumatic retroperitoneal hematoma    Required IR embolization.    Secondary hyperparathyroidism (HCC)    Continue with renal diet and phosphate binders with meals.          Subjective     The patient is awake and alert says he just wants to really feel better.  Also having pain still in his back.    No fever chills positive fatigue  No nausea vomiting diarrhea abdominal pain  Positive back pain  No chest pain shortness of breath positive lower extremity swelling.    Objective :  Temp:  [97.6 °F (36.4 °C)-98.4 °F (36.9 °C)] 97.6 °F (36.4 °C)  HR:  [63-76] 70  BP: (146-165)/(61-78) 165/75  Resp:  [16-18] 17  SpO2:  [98 %-99 %] 98 %  O2 Device: None (Room air)    Current Weight: Weight - Scale: 92.1 kg (203 lb)  First Weight: Weight - Scale: 98.9 kg (218 lb)  I/O         05/03 0701  05/04 0700 05/04 0701  05/05 0700 05/05 0701  05/06 0700    P.O. 340 460     I.V. (mL/kg) 500 (5.4)      Total Intake(mL/kg) 840 (9.1) 460 (5)     Urine (mL/kg/hr)  160 (0.1)     Other 1570      Total Output 1570 160     Net -730 +300                  Physical Exam  Constitutional:       General: He is not in acute distress.     Appearance: He is not toxic-appearing.   HENT:      Head:  Normocephalic and atraumatic.      Mouth/Throat:      Mouth: Mucous membranes are dry.   Eyes:      Extraocular Movements: Extraocular movements intact.   Cardiovascular:      Rate and Rhythm: Normal rate and regular rhythm.      Heart sounds:      No gallop.      Comments: Positive edema.  Pulmonary:      Effort: Pulmonary effort is normal. No respiratory distress.      Breath sounds: No wheezing or rales.   Abdominal:      General: Bowel sounds are normal. There is no distension.      Palpations: Abdomen is soft.      Tenderness: There is no abdominal tenderness.   Neurological:      Mental Status: He is alert and oriented to person, place, and time.         Medications:    Current Facility-Administered Medications:     acetaminophen (TYLENOL) tablet 975 mg, 975 mg, Oral, Q8H KEMAL, Damian Cerda MD, 975 mg at 05/05/25 0513    allopurinol (ZYLOPRIM) tablet 100 mg, 100 mg, Oral, Daily, Damian Cerda MD, 100 mg at 05/05/25 0820    apixaban (ELIQUIS) tablet 5 mg, 5 mg, Oral, BID, Damian Cerda MD, 5 mg at 05/04/25 1754    atorvastatin (LIPITOR) tablet 80 mg, 80 mg, Oral, Daily With Dinner, Damian Cerda MD, 80 mg at 05/04/25 1610    bisacodyl (DULCOLAX) rectal suppository 10 mg, 10 mg, Rectal, Daily PRN, Michelle T Landa, DO, 10 mg at 04/18/25 0000    calcium carbonate (TUMS) chewable tablet 500 mg, 500 mg, Oral, Daily PRN, GRANT Church, 500 mg at 05/03/25 1205    docusate sodium (COLACE) capsule 100 mg, 100 mg, Oral, BID, Michelle T Landa, DO, 100 mg at 05/05/25 0820    epoetin jessica (EPOGEN,PROCRIT) injection 8,000 Units, 8,000 Units, Intravenous, After Dialysis, German Linton MD, 8,000 Units at 05/03/25 1015    gabapentin (NEURONTIN) capsule 100 mg, 100 mg, Oral, Once per day on Monday Wednesday Friday, Damian Cerda MD, 100 mg at 05/05/25 0820    lidocaine (LIDODERM) 5 % patch 1 patch, 1 patch, Topical, Daily PRN, Damian Cerda MD, 1 patch at 04/18/25 9415    melatonin tablet 6 mg, 6 mg, Oral, , Jayden DUEÑAS  "MD Kashif, 6 mg at 05/04/25 2121    methocarbamol (ROBAXIN) tablet 1,000 mg, 1,000 mg, Oral, Q8H KEMAL, Jayden Rowland MD, 1,000 mg at 05/05/25 0517    naloxone (NARCAN) 0.04 mg/mL syringe 0.04 mg, 0.04 mg, Intravenous, Q1MIN PRN, Damian Cerda MD    NIFEdipine (PROCARDIA XL) 24 hr tablet 30 mg, 30 mg, Oral, After Dinner, Damian Cerda MD, 30 mg at 05/04/25 1754    ondansetron (ZOFRAN-ODT) dispersible tablet 4 mg, 4 mg, Oral, Q6H PRN, Gino Austin PA-C, 4 mg at 04/20/25 0023    oxyCODONE (ROXICODONE) IR tablet 5 mg, 5 mg, Oral, Q4H PRN, 5 mg at 05/04/25 2122 **OR** oxyCODONE (ROXICODONE) immediate release tablet 10 mg, 10 mg, Oral, Q4H PRN, Damian Cerda MD, 10 mg at 05/02/25 2306    oxyCODONE (ROXICODONE) split tablet 2.5 mg, 2.5 mg, Oral, Q8H PRN, Jayden Rowland MD, 2.5 mg at 05/03/25 1026    polyethylene glycol (MIRALAX) packet 17 g, 17 g, Oral, Daily PRN, Michelle Landa DO, 17 g at 04/26/25 1259    senna (SENOKOT) tablet 17.2 mg, 2 tablet, Oral, Daily With Lunch, Michelle Landa DO, 17.2 mg at 05/04/25 1227    sevelamer (RENAGEL) tablet 1,600 mg, 1,600 mg, Oral, TID With Meals, Damian Cerda MD, 1,600 mg at 05/05/25 0820    white petrolatum-mineral oil (EUCERIN,HYDROCERIN) cream, , Topical, TID PRN, Michelle Landa DO      Lab Results: I have reviewed the following results:  Results from last 7 days   Lab Units 05/05/25  0532 05/04/25  0945 04/29/25  1350   WBC Thousand/uL  --  5.38 7.55   HEMOGLOBIN g/dL  --  9.7* 8.0*   HEMATOCRIT %  --  32.2* 26.3*   PLATELETS Thousands/uL  --  214 224   POTASSIUM mmol/L 4.1 4.2 5.5*   CHLORIDE mmol/L 95* 95* 96   CO2 mmol/L 24 22 23   BUN mg/dL 31* 24 60*   CREATININE mg/dL 6.09* 4.70* 8.38*   CALCIUM mg/dL 9.0 9.3 9.2   ALBUMIN g/dL  --  4.2  --        Administrative Statements     Portions of the record may have been created with voice recognition software. Occasional wrong word or \"sound a like\" substitutions may have occurred due to the inherent limitations " of voice recognition software. Read the chart carefully and recognize, using context, where substitutions have occurred.If you have any questions, please contact the dictating provider.

## 2025-05-05 NOTE — ASSESSMENT & PLAN NOTE
- Rehabilitation medicine physician for daily monitoring of care, 24 hour availability for acute medical issues, medication management, and therapeutic and diagnostic assessments.  - 24 hour rehabilitation nursing 7 days per week for: management/teaching of medications, bowel/bladder routine, skin care.  - PT, OT for 2-3 hours per day, 5 to 6 days per week; 15 hours per week  - Rehabilitation Psychology as needed for adjustment and coping  - CM for barriers to discharge, community resources, and family support  - Discharge planning following to help ensure a safe and efficient discharge  - 900/7 program due to HD status, pain, expected therapy tolerance  4/18- Ongoing dispo planning with patient/sister and CM. Staff expressed that patient is progressing well from a functional standpoint but continues to have difficulties with ADLs/iADLs due to impaired vision. Will continue to work towards goals for safe dischare  4/22- DC date now pending outpatient HD setup  5/5- Outpatient HD chair time is set up. CM working on transportation and HHS

## 2025-05-05 NOTE — ASSESSMENT & PLAN NOTE
> 4/14: Na 129 - A&O x3, pt mentating appropriately  > 4/15: Na 125 - Discussed with IM who is coordinating with Nephrology; they will manage Na during HD   > Sodium levels continue to fluctuate from 125-131. Pt remains A&O x3. Nephrology will continue to manage electrolyte deficiencies  > 5/5- Na 130    - Trend on BMP  - Electrolyte management per nephrology  - Continue HD in setting of ESRD

## 2025-05-05 NOTE — ASSESSMENT & PLAN NOTE
Admitted to the trauma service after a fall with L2 vertebral body and retroperitoneal hematoma.  S/p IR embolization of distal right deep circumflex iliac artery/distal right superior gluteal artery both of which supplied an area of active extravasation and then pseudoaneurysms present at distal branches of the left inferior gluteal artery. The artery was embolized using coils and gelfoam.   He was type and screened 4/17/25 in case needed blood during HD on that day.  Hgb on 5/4/25 was 9.7

## 2025-05-05 NOTE — PROGRESS NOTES
05/05/25 0830   Pain Assessment   Pain Assessment Tool 0-10   Pain Score 5   Pain Location/Orientation Orientation: Lower;Location: Back;Orientation: Left;Location: Buttocks   Hospital Pain Intervention(s) Repositioned   Restrictions/Precautions   Precautions Bed/chair alarms  (Alrams off during the day)   General   Change In Medical/Functional Status (S)  IRP during the day agian today. Pt alert and oriented. Educated to stand hourly with his RW for pressure relief   Cognition   Overall Cognitive Status WFL   Sit to Stand   Type of Assistance Needed Independent;Adaptive equipment   Sit to Stand CARE Score 6   Bed-Chair Transfer   Type of Assistance Needed Adaptive equipment;Independent   Chair/Bed-to-Chair Transfer CARE Score 6   Walk 10 Feet   Type of Assistance Needed Independent;Adaptive equipment   Comment RW   Walk 10 Feet CARE Score 6   Therapeutic Interventions   Strengthening Seated LAQ and hip flex 3 x 10   Other Bilat lower legs ace wrapped for edema after RN performed new dressing Right foot   Other Comments   Comments Provided pt with ROHO cushion for recliner chair for improved pressure relief. Pt educated again on proper sitting positioning, NOT to use pillows under his buttocks and NOT to push pillows behind his back. Practiced pt elevating his legs and adjusting back of the chair on his own.   Assessment   Treatment Assessment Pt with right foot actively bleeding this morning. RN performed Dressing changes twice. Deferred walking this session. Focused on repostioning and improving pressure relief while pt seated in recliner.Pt alert and oriented today. Safe to be Independent during the day in his room for standing hourly for pressure relief and to use the bathroom. Encouraged to keep legs elevated more.   PT Therapy Minutes   PT Time In 0830   PT Time Out 0930   PT Total Time (minutes) 60   PT Mode of treatment - Individual (minutes) 60   PT Mode of treatment - Concurrent (minutes) 0   PT Mode of  treatment - Group (minutes) 0   PT Mode of treatment - Co-treat (minutes) 0   PT Mode of Treatment - Total time(minutes) 60 minutes   PT Cumulative Minutes 1688   Therapy Time missed   Time missed? No

## 2025-05-05 NOTE — ASSESSMENT & PLAN NOTE
4/15- Adjusted bowel medications: Colace 100mg BID, Senna 17.2mg with lunch and PRN Miralax/Suppos  4/17- No BM since 4/13. Lactulose this evening if no BM after lunch. Can consider Relistor if no success for opioid-inducted constipation  4/18- small hard BMs x2 yesterday after lactulose and suppository. Continue bowel regimen  >BM 4/22 but required Relistor and suppository/lactulose  > Last BM 5/3    - Will follow BMs and adjust bowel regimen to target soft, formed stools q1-2 days, per pt's regular schedule.

## 2025-05-05 NOTE — ASSESSMENT & PLAN NOTE
"Lab Results   Component Value Date    HGBA1C 5.7 (H) 02/09/2025       No results for input(s): \"POCGLU\" in the last 72 hours.    Blood Sugar Average: Last 72 hrs:      "

## 2025-05-05 NOTE — PROGRESS NOTES
"   05/05/25 1259   Pain Assessment   Pain Assessment Tool 0-10   Pain Score 3   Pain Location/Orientation Orientation: Lower;Location: Back   Pain Radiating Towards L side only   Pain Onset/Description Onset: Ongoing   Effect of Pain on Daily Activities dec pain with activity and repositioning   Patient's Stated Pain Goal No pain   Hospital Pain Intervention(s) Repositioned;Ambulation/increased activity;Rest   Restrictions/Precautions   Precautions Fall Risk;Pain;Pressure Ulcer;Spinal precautions;Cognitive   Braces or Orthoses LSO   Cognition   Arousal/Participation Cooperative   Subjective   Subjective \"Keep an eye out for blood trailing behind me.\" His foot was bleeding this morning and during session, RN addressed this during session.   Roll Left and Right   Comment (S)  bed mobility not addressed because he has been sleeping in the recliner  (to clarify tomorrow that he plans to sleep in recliner at home, if not, review bed mobility)   Sit to Lying   Comment bed mobility not addressed because he has been sleeping in the recliner   Lying to Sitting on Side of Bed   Comment bed mobility not addressed because he has been sleeping in the recliner   Sit to Stand   Type of Assistance Needed Independent;Adaptive equipment   Comment to RW   Sit to Stand CARE Score 6   Bed-Chair Transfer   Type of Assistance Needed Independent;Adaptive equipment   Comment RW   Chair/Bed-to-Chair Transfer CARE Score 6   Transfer Bed/Chair/Wheelchair   Limitations Noted In Balance;LE Strength;Vision   Adaptive Equipment Roller Walker   Walk 10 Feet   Type of Assistance Needed Independent;Adaptive equipment   Comment RW   Walk 10 Feet CARE Score 6   Walk 50 Feet with Two Turns   Type of Assistance Needed Independent;Adaptive equipment   Comment RW   Walk 50 Feet with Two Turns CARE Score 6   Walk 150 Feet   Type of Assistance Needed Independent;Adaptive equipment   Comment RW   Walk 150 Feet CARE Score 6   Ambulation   Primary Mode of " "Locomotion Prior to Admission Walk   Distance Walked (feet) 250 ft   Assist Device Roller Walker   Gait Pattern Inconsistant Lisa;Slow Lisa;Decreased foot clearance;Forward Flexion;Wide GARO;Step through;Improper weight shift   Limitations Noted In Balance;Endurance;Heel Strike;Speed;Strength   Walk Assist Level Modified Independent   Does the patient walk? 2. Yes   Curb or Single Stair   Style negotiated Single stair  (6\"  steps)   Type of Assistance Needed Independent   Comment PT physically present in vicinity of  steps discussing his sisters home set up, no VC needed for sequencing or safety, no phys A needed for safety, balance, sequencing   1 Step (Curb) CARE Score 6   4 Steps   Type of Assistance Needed Independent   4 Steps CARE Score 6   Stairs   Type Stairs  (6\"  steps)   # of Steps 8   Weight Bearing Precautions Sternal;Fall Risk   Assist Devices Bilateral Rail   Findings inquried with pt about him going to stay at his sisters house, pt became angry saying \"I dont live there, theres a difference\" PT asked pt if his sisters house had stairs, he replied \"I dont know I havent been there in a while.\" He then reported that there are a couple steps to enter and there is a second floor but he can stay on the second floor. He was unwilling to talk further about his sisters home set up. From team discussion this AM, pt may be going to stay with his sister in which case he will have S while negotiating stairs.   Other Comments   Comments During stair negotiation, pt R foot began bleeding. CURLY Quintana was assisting CURLY Reddy with passing meds along so CURLY Quintana was notified and she wrapped an ACE wrap around it to finish therapy. After therapy pt was brought back to his room and CURLY Quintana said she would pass along to CURLY Reddy that he was ready for a dressing change on the R foot.   Assessment   Treatment Assessment Pt participated in skilled PT session with emphasis on ambulation and stair negotiation " training through functional task practice. Pt ambulation and stair negotiation were performed at baseline and he did not need any VC for sequencing, positioning, RW management etc. Pt did not need seated rest break throughout ambulation and was able to complete 8 steps consecutively demonstrating improvement from performance over the weekend. During stair negotiation, pt R foot began bleeding. See above for additional information. Pt was upset after this, but was willing to finish therapy. Prior to DC Wednesday, PT POC should continue to emphasize stair negotiation and curb negotation to ensure safety.   Problem List Decreased strength;Decreased endurance;Decreased mobility;Impaired balance;Decreased safety awareness   Barriers to Discharge Decreased caregiver support   PT Barriers   Physical Impairment Decreased strength;Decreased range of motion;Decreased endurance;Impaired balance;Decreased mobility;Impaired vision   Functional Limitation Car transfers;Stair negotiation;Standing;Transfers;Walking   Plan   Treatment/Interventions Functional transfer training;LE strengthening/ROM;Elevations;Therapeutic exercise;Endurance training;Cognitive reorientation;Patient/family training;Equipment eval/education;Bed mobility;Gait training;Compensatory technique education   Progress Progressing toward goals   PT Therapy Minutes   PT Time In 1430   PT Time Out 1500   PT Total Time (minutes) 30   PT Mode of treatment - Individual (minutes) 30   PT Mode of treatment - Concurrent (minutes) 0   PT Mode of treatment - Group (minutes) 0   PT Mode of treatment - Co-treat (minutes) 0   PT Mode of Treatment - Total time(minutes) 30 minutes   PT Cumulative Minutes 1718   Therapy Time missed   Time missed? No

## 2025-05-05 NOTE — CASE MANAGEMENT
Order received from therapy for a commode. Order placed with [employer] via [employer] for item to be delivered to pts room prior to dc.     1038 received call from Vitaliy at [facility] inquiring about dc. Cm explained pts family or the hiring of a taxi is how pt is going to get to dialysis. Gabe is still working on [facility] services to be in place and the anticipated dc is Wednesday.  Vitaliy's number is [phone] so cm can confirm pts start date of Thursday for dialysis.     1350 vna of [facility] accepted referral. Info placed on dc instructions.

## 2025-05-05 NOTE — ASSESSMENT & PLAN NOTE
> Evaluated by APS on acute care  - Tylenol 975 mg q8h scheduled  - Gabapentin 100 mg three times weekly  - Methocarbamol 1000 mg q8h scheduled  - Lidocaine patches daily PRN  - Oxycodone 5 q4h PRN with 2.5mg Q8 for breakthrough pain, wean as possible

## 2025-05-05 NOTE — ASSESSMENT & PLAN NOTE
Had 5 U PRBCs during hospital stay  On  4/15/24 was 7.2 ---> Renal increased his Epogen  CBC 4/29/25 = 8.0/stable  Hgb improved and on 5/4/25 was up to 9.7

## 2025-05-05 NOTE — ASSESSMENT & PLAN NOTE
Wt Readings from Last 3 Encounters:   05/05/25 96.6 kg (213 lb)   04/11/25 98.5 kg (217 lb 2.5 oz)   03/24/25 107 kg (234 lb 12.6 oz)     - Most recent TTE from 10/9/24 with EF 60-65%, grade 1 diastolic dysfunction  - Volume management with HD per nephrology  - Monitor weights  - Appreciate medicine and nephrology recommendations and management

## 2025-05-05 NOTE — ASSESSMENT & PLAN NOTE
"On no rate control medications  Examines RRR  Anticoagulation with Eliquis as at home but he doesn't want to take it because he is worried it will make his right eye vision worse.  I d/w on call Optho Dr Diaz = \"The risk is not quantified in the literature. Dr edmond's note suggests a diagnosis of proliferative diabetic retinopathy and bilateral and recurrent vitreous hemorrhage. So he is at risk for - and has had hemorrhage - whether or not he is on eliquis (assuming dr edmond is correct).     I would always recommend life and death are more important so to do what you recommend re his systemic condition, and then to follow up with dr edmond as he recommended so a real exam can be done in the clinic\".   Explained all this to him and he understands  He had been taking the Eliquis again and now refusing it because he is afraid of his foot bleeding.  He says \"no point in discussing it\"; I d/w him but again refuses to take it.  He is fully aware that he may have a stroke if he does not take the Eliquis  He did take 1 dose 5/3/25 and both doses 5/4/25  "

## 2025-05-05 NOTE — CASE MANAGEMENT
Along with Kadeem Abad, PD, meeting held with pt to review dc plan as pt expressed anxiety about the dc occurring this week. Cm explained [facility] services are in place, reviewed his transportation option and confirmed dialysis is t-th-sat 10am at [facility].     1535 phone call placed to pts sister Alpa and reviewed all dc arrangements. Cm provided her with the name of [facility] and contact info as well as the services they are going to be providing. Gabe provided her with the name and chair time of dialysis. Gabe also provided her with the name of [employer] and phone number, although she has used them in the past. Alpa stated they will help out where able with transportation to dialysis. She was relieved to hear it wasn't 5am. Dc time on Wednesday discussed for 1600 due to her having her grandchildren until 3.     1545 phone call placed to Vitaliy at [facility] with the above info. Pt is to start at their location on Thursday.

## 2025-05-05 NOTE — PROGRESS NOTES
Progress Note - PMR   Name: Naresh Carpio 64 y.o. male I MRN: [id number]  Unit/Bed#: -01 I Date of Admission: 4/11/2025   Date of Service: 5/5/2025 I Hospital Day: 24     Assessment & Plan  Traumatic retroperitoneal hematoma  - Sustained from mechanical fall several days prior to initial presentation on 4/2/25, presenting with progressively worsening low back pain. Trauma imaging revealed a large retroperitoneal hematoma with active extravasation and hemoglobin drop. Now s/p transfusion, Kcentra and ultimately IR embolization of the right deep circumflex iliac artery and superior gluteal artery with Dr. Garcia on 4/3/25. Unfortunately, on 4/6/25, his hemoglobin decreased and repeat CT showed a 5 mm inferior gluteal artery branch pseudoaneurysm and he is s/p IR pelvic angiogram on 4/7/25 with Dr. Hernandez, which demonstrated pseudoaneurysms at the distal branches of the left inferior gluteal artery which was embolized.   > 4/15: Hgb 7.2 today (from 7.6 yesterday). Back pain seems controlled. Will be receiving increased Epogen today per IM/Nephro  > Hgb 9.7 on 5/4 labs    - Continue to trend Hgb on CBC closely  - Consider repeat imaging if Hgb continues to downtrend or pain significantly worsens  - Contact IR if there are additional bleeding concerns given multiple prior interventions described above  - Local right hip/groin access site incision care  - Analgesia, see below  - PT and OT  Acute pain due to trauma  > Evaluated by APS on acute care  - Tylenol 975 mg q8h scheduled  - Gabapentin 100 mg three times weekly  - Methocarbamol 1000 mg q8h scheduled  - Lidocaine patches daily PRN  - Oxycodone 5 q4h PRN with 2.5mg Q8 for breakthrough pain, wean as possible  Impaired mobility and activities of daily living  - Rehabilitation medicine physician for daily monitoring of care, 24 hour availability for acute medical issues, medication management, and therapeutic and diagnostic assessments.  - 24 hour rehabilitation  nursing 7 days per week for: management/teaching of medications, bowel/bladder routine, skin care.  - PT, OT for 2-3 hours per day, 5 to 6 days per week; 15 hours per week  - Rehabilitation Psychology as needed for adjustment and coping  - CM for barriers to discharge, community resources, and family support  - Discharge planning following to help ensure a safe and efficient discharge  - 900/7 program due to HD status, pain, expected therapy tolerance  4/18- Ongoing dispo planning with patient/sister and CM. Staff expressed that patient is progressing well from a functional standpoint but continues to have difficulties with ADLs/iADLs due to impaired vision. Will continue to work towards goals for safe dischare  4/22- DC date now pending outpatient HD setup  5/5- Outpatient HD chair time is set up. CM working on transportation and [facility]  L2 vertebral fracture (HCC)  > Sustained from a mechanical fall on 3/24/25, found to have an acute, obliquely oriented fracture of the L2 vertebral body with distraction of the dominant fracture fragment and involvement of the anterior and posterior cortices and superior endplate  > At that time, managed non-operatively with LSO bracing, though it does not appear orthopedic spine surgery or neurosurgery evaluated at the time of injury    - Maintain LSO brace with lumbar spinal precautions   - Should follow-up with spine surgery outpatient  Closed fracture of proximal end of left humerus with routine healing  - Sustained from fall in early February, per CT of the LUE on 2/9/25, there was a minimally displaced fracture of the anterior facet of the greater tuberosity of the left humerus, minimally apparent on repeat x-ray of the shoulder on 3/24/25  - He was evaluated by orthopedic surgery at the time of the initial injury and was recommended to maintain NWB through the LUE with a sling  - There is no further orthopedic evaluation documentation and it does not appear that further  follow-up occurred.  - Clinically, he is not describing ongoing significant left arm or shoulder pain and has been weight-bearing through the LUE  > 4/14: Repeat humerus X-ray done yesterday shows no change in fracture alignment. Ortho consulted.  >4/15: Ortho c/s: May WBAT to LUE. PT/OT to increase strength and ROM. F/u with Dr. Cisneros in 4 weeks    - Monitor  - PT/OT  Insomnia  Patient reports he has an overall a poor sleep hygiene: he sleeps at 1-2am nightly and watches TV up until bed.  Advised patient to attempt to regulate sleep/wake cycle while here to better participate in therapy. Attempt to try to sleep earlier around midnight  >4/15: reviewed sleep log: Pt went to sleep around midnight and slept for 6 hours.  > 4/17- Poor sleep last night: was awake every other hour. Requiring nap during the day. Can increase melatonin tomorrow if this persists  Pt endorses moving from bed to chair multiple times throughout the night which is his baseline at home too    - Continue sleep log  - Continue melatonin 6mg nightly  At risk for constipation   4/15- Adjusted bowel medications: Colace 100mg BID, Senna 17.2mg with lunch and PRN Miralax/Suppos  4/17- No BM since 4/13. Lactulose this evening if no BM after lunch. Can consider Relistor if no success for opioid-inducted constipation  4/18- small hard BMs x2 yesterday after lactulose and suppository. Continue bowel regimen  >BM 4/22 but required Relistor and suppository/lactulose  > Last BM 5/3    - Will follow BMs and adjust bowel regimen to target soft, formed stools q1-2 days, per pt's regular schedule.  Anemia due to chronic kidney disease, on chronic dialysis (HCC)  > 4/14: Hgb 7.6  > 4/15: Hgb 7.2 - Discussed with IM who is coordinating with Nephrology; they will increase his Epoetin jessica  > 4/17: Hgb 7.7  > 4/29: Hgb 8.0  > Hgb 9.7 on 5/4 labs    - Trend Hgb on CBC  - Epoetin jessica with dialysis  - Transfusions per IM, appreciate their recommendations  Visual  "disturbance  - Reporting \"floater\" within the right eye with acute onset on 4/11/25 at mid-day while watching TV; describes a dark, \"spider\"-shaped floater that occupies a significant portion of the visual field, painless; has similar floaters in the left eye but smaller  - Ophthalmology consulted on [facility] admission by IM, per prior trauma surgery documentation, Dr. Chaudhry of ophthalmology was made personally aware of clinical situation on 4/11/25 prior to [facility] admission  > Ophthalmology consult 4/12: Exam c/w proliferative diabetic retinopathy with vitreous hemorrhage. Pt will follow up with ophtho immediately after discharge and for referral to a retina specialist  > 4/15- Pt thinks his R eye floater may be larger today, unable to clearly state or quantify symptoms. No other visual complaints of increased number of floaters, double/blurry vision, or bright flashes. Advised patient to inform us if symptoms change - will reach out to Ophthalmology accordingly  > 4/18- No changes noted in vision/floaters. His overall impaired vision continues to be a barrier for his ADLs/iADLs  > 4/20-24- Stable R eye floater    - Monitor for changes in vision  Diabetic ulcer of left midfoot associated with type 2 diabetes mellitus, limited to breakdown of skin (HCC)  - Has chronic wounds affecting the left foot plantar surface, left foot 1st and 2nd digits  - Left Plantar Foot Wounds and Left toes:  Cleanse with NSS and pat dry. Paint all wounds with Betadine daily and cover plantar open wound with silicone bordered foam dressing. Naren with T for Treatment and change every other day or PRN   > 4/22 : Wound care recs: Paint left foot 1st and 2nd digits with betadine dailyand covering plantar open wound with foam. Podiatry consulted   > 4/22: Podiatry did bedside debridement of left foot plantar ulcer. Continue with local wound care, no surgical intervention needed. SALVATORE ordered and vascular surgery consulted. Pt to maintain frequent LLE " elevation, strict pressure and fracture reduction. WBAT to LLE  > 4/24: Pictures of LLE reviewed on chart. Wounds are stable with no signs of infection. XR L foot 4/22 with no evidence of OM. ABIs and toe pressures are within healing range. Pt to continue to follow up with wound care for further management  PAD (peripheral artery disease) ([facility])  > Non invasive arterial studies from 3/28:  Diffuse disease noted throughout the femoral-popliteal arteries. Evidence of 50-75% stenosis in the proximal popliteal artery. Evidence of 50-75% stenosis at the proximal tibio-peroneal trunk.   LLE: Diffuse disease noted throughout the femoral-popliteal arteries. Evidence of >75% stenosis in the proximal popliteal artery.  Tech Note: Patient refused SALVATORE.  SALVATORE 4/22: Left lower limb SALVATORE noncompressible, metatarsal pressure 155 mmHg, great toe pressure 72 mmHg. Right lower limb noncompressible SALVATORE, metatarsal pressure 158 mmHg, second toe pressure 101 mmHg   Vascular surgery consult pending  Hyponatremia  > 4/14: Na 129 - A&O x3, pt mentating appropriately  > 4/15: Na 125 - Discussed with IM who is coordinating with Nephrology; they will manage Na during HD   > Sodium levels continue to fluctuate from 125-131. Pt remains A&O x3. Nephrology will continue to manage electrolyte deficiencies  > 5/5- Na 130    - Trend on BMP  - Electrolyte management per nephrology  - Continue HD in setting of ESRD  ESRD (end stage renal disease) ([facility])  - Continue HD  - Nephrology consulted and following, appreciate their recommendations  >4/22: new outpatient HD center needed close to his home after discharge. CM to follow up. DC pushed  Paroxysmal atrial fibrillation (HCC)  - Currently rate controlled  - Embolic stroke risk reduction with apixaban 5 mg BID    >4/22- Refusing it this morning. Extensive conversation had with patient to discuss the importance of compliance given his high stroke risk.   > 4/24- now compliant with Eliquis  Chronic diastolic  "(congestive) heart failure (HCC)  Wt Readings from Last 3 Encounters:   05/05/25 96.6 kg (213 lb)   04/11/25 98.5 kg (217 lb 2.5 oz)   03/24/25 107 kg (234 lb 12.6 oz)     - Most recent TTE from 10/9/24 with EF 60-65%, grade 1 diastolic dysfunction  - Volume management with HD per nephrology  - Monitor weights  - Appreciate medicine and nephrology recommendations and management  Primary hypertension  - Nifedipine  - Appreciate IM and nephrology management  Gout  - Allopurinol for flare prophylaxis  Hyperphosphatemia  > 4/15: Phos 4.6  - Management per nephrology  - Continue phosphate binders  Wound of skin  - Has right anterior pre-tibial scab, wash gently daily, leave open to air  - Wound care RN consulted and following  At high risk for skin breakdown  > 4/21- New sacral pressure injury noted by staff. Pictures reviewed on chart. Patient primarily sleeps in recliner (both here and at home). Discussed with therapy and pt will use a cushion that offers sacral relief on recliner. Will continue to monitor  >4/22- Wound care recs:  Cleanse wound with NSS, pat dry. Apply calcium alginate with silver to wound bed and cover with foam. Change every other day and prn. Cushion will not be covered per therapy for home - advised patient to find one off [employer] to continue offloading     - Moisturize skin daily with skin nourishing cream  - Ehob cushion in chair when out of bed.  - Preventative hydraguard to bilateral heels BID and PRN.   - Calazime paste to sacrum/buttocks BID and PRN  - Monitor clinically for breakdown, frequent turns  - Wound care RN consulted and following  At risk for venous thromboembolism (VTE)  - Apixaban, see above  Secondary hyperparathyroidism (HCC)    Hyperkalemia    Type 2 diabetes mellitus with foot ulcer, without long-term current use of insulin (HCC)  Lab Results   Component Value Date    HGBA1C 5.7 (H) 02/09/2025       No results for input(s): \"POCGLU\" in the last 72 hours.    Blood Sugar " Average: Last 72 hrs:      Hallucinations  5/5- Over the weekend patient was having hallucinations and irregular behavior. MAR reviewed and no new medications were started. Back to baseline this morning. Symptoms likely due to hospital-acquired delirium from prolonged stay, frequent oxycodone use and overall poor sleep     - Monitor for any recurrence    Subjective   64-year-old male with history of chronic anemia, atrial fibrillation, diabetes, end-stage renal disease on hemodialysis presenting on 4/2 with progressively worsening back pain after a fall and found to have large retroperitoneal hematoma with active extravasation status post Kcentra stabilization with transfusions and multiple IR embolizations. Course complicated by acute on chronic pain     Chief Complaint: f/u ambulatory dysfunction    Interval: Patient seen and examined. No acute overnight events. Over the weekend, patient was having hallucinations and seeing/hearing people in his room that were not there. CTH was done on 5/3 with no acute findings. IRP were taken away. Patient reportedly back to baseline today. MAR reviewed: no new medications were given or started. Symptoms likely hospital-acquired delirium. Nursing reports that patient took Eliquis over the weekend, likely due to the confusion, and subsequently had profuse bleeding from his foot this morning. He refused Eliquis today. Dressing is now c/d/I. Dispo planning is still ongoing with CM- he states his missing phone is still a barrier for him. Otherwise, denies any fevers, chills, chest pain, sob, abdominal pain, nausea/vomiting, lightheadedness or dizziness. Labs today reviewed and notable for: Na 130 and Cr 6.09      Objective :  Temp:  [97.6 °F (36.4 °C)-98.4 °F (36.9 °C)] 97.6 °F (36.4 °C)  HR:  [63-76] 70  BP: (146-165)/(61-78) 165/75  Resp:  [16-18] 17  SpO2:  [98 %-99 %] 98 %  O2 Device: None (Room air)    Functional Update:  Mobility: sup bed mobility and ambulation with ',  incidental touching for stairs x12  Transfers: supervision with RW  ADLs: Ind oral care, Judi grooming, sup UBD    Physical Exam  Vitals reviewed.   Constitutional:       Appearance: Normal appearance.   HENT:      Head: Normocephalic and atraumatic.      Right Ear: External ear normal.      Left Ear: External ear normal.      Nose: Nose normal.      Mouth/Throat:      Pharynx: Oropharynx is clear.   Eyes:      Conjunctiva/sclera: Conjunctivae normal.   Cardiovascular:      Rate and Rhythm: Normal rate and regular rhythm.      Heart sounds: Normal heart sounds.   Pulmonary:      Effort: Pulmonary effort is normal. No respiratory distress.      Breath sounds: Normal breath sounds.   Abdominal:      General: Bowel sounds are normal. There is no distension.      Palpations: Abdomen is soft.   Musculoskeletal:      Right lower leg: Edema present.      Left lower leg: Edema present.      Comments: +LSO. Bilateral LE's with ACE wraps that are c/d/i   Skin:     General: Skin is warm and dry.   Neurological:      Mental Status: He is alert. Mental status is at baseline.   Psychiatric:         Mood and Affect: Mood normal.         Behavior: Behavior normal.           Scheduled Meds:  Current Facility-Administered Medications   Medication Dose Route Frequency Provider Last Rate    acetaminophen  975 mg Oral Q8H KEMAL Damian Cerda MD      allopurinol  100 mg Oral Daily Damian Cerda MD      apixaban  5 mg Oral BID Damian Cerda MD      atorvastatin  80 mg Oral Daily With Dinner Damian Cerda MD      bisacodyl  10 mg Rectal Daily PRN Michelle Landa, DO      calcium carbonate  500 mg Oral Daily PRN GRANT Church      docusate sodium  100 mg Oral BID Michelle LAMONTE Landa, DO      epoetin jessica  8,000 Units Intravenous After Dialysis German Linton MD      gabapentin  100 mg Oral Once per day on Monday Wednesday Friday Damian Cerda MD      lidocaine  1 patch Topical Daily PRN Damian Cerda MD      melatonin  6 mg Oral HS  Jayden Rowland MD      methocarbamol  1,000 mg Oral Q8H [facility] Jayden Rowland MD      naloxone  0.04 mg Intravenous Q1MIN PRN Damian Cerda MD      NIFEdipine  30 mg Oral After Dinner Damian Cerda MD      ondansetron  4 mg Oral Q6H PRN Gino Austin PA-C      oxyCODONE  5 mg Oral Q4H PRN Damian Cerda MD      Or    oxyCODONE  10 mg Oral Q4H PRN Damian Cerda MD      oxyCODONE  2.5 mg Oral Q8H PRN Jayden Rowalnd MD      polyethylene glycol  17 g Oral Daily PRN Michelle Landa,       senna  2 tablet Oral Daily With Lunch Michelle Landa DO      sevelamer  1,600 mg Oral TID With Meals Damian Cerda MD      white petrolatum-mineral oil   Topical TID PRN Michelle Landa DO           Lab Results: I have reviewed the following results:  Results from last 7 days   Lab Units 05/04/25  0945 04/29/25  1350   HEMOGLOBIN g/dL 9.7* 8.0*   HEMATOCRIT % 32.2* 26.3*   WBC Thousand/uL 5.38 7.55   PLATELETS Thousands/uL 214 224     Results from last 7 days   Lab Units 05/05/25  0532 05/04/25  0945 04/29/25  1350   BUN mg/dL 31* 24 60*   SODIUM mmol/L 130* 129* 132*   POTASSIUM mmol/L 4.1 4.2 5.5*   CHLORIDE mmol/L 95* 95* 96   CREATININE mg/dL 6.09* 4.70* 8.38*   AST U/L  --  14  --    ALT U/L  --  7  --

## 2025-05-05 NOTE — ASSESSMENT & PLAN NOTE
Patient is end-stage renal disease hemodialysis is Tuesday Thursday Saturday.  The patient's last dialysis was Saturday he will be due tomorrow.  Still has edema clinically and unsure if we will be able to remove more volume but will attempt on dialysis.    Labs reviewed today he is hyponatremia sodium concentration is 130 mmol/L potassium is 4.1.    Hemodialysis Tuesday Thursday Saturday

## 2025-05-06 ENCOUNTER — APPOINTMENT (INPATIENT)
Dept: DIALYSIS | Facility: HOSPITAL | Age: 65
DRG: 939 | End: 2025-05-06
Attending: INTERNAL MEDICINE
Payer: MEDICARE

## 2025-05-06 PROBLEM — D64.9 ANEMIA: Status: ACTIVE | Noted: 2025-05-06

## 2025-05-06 PROBLEM — D64.9 ANEMIA: Status: RESOLVED | Noted: 2025-05-06 | Resolved: 2025-05-06

## 2025-05-06 PROBLEM — Z91.89 AT RISK FOR ACID-BASE IMBALANCE: Status: ACTIVE | Noted: 2025-05-06

## 2025-05-06 PROBLEM — Z91.89 ELECTROLYTE IMBALANCE RISK: Status: ACTIVE | Noted: 2025-05-06

## 2025-05-06 PROCEDURE — 99233 SBSQ HOSP IP/OBS HIGH 50: CPT | Performed by: STUDENT IN AN ORGANIZED HEALTH CARE EDUCATION/TRAINING PROGRAM

## 2025-05-06 PROCEDURE — 99232 SBSQ HOSP IP/OBS MODERATE 35: CPT | Performed by: INTERNAL MEDICINE

## 2025-05-06 PROCEDURE — 97116 GAIT TRAINING THERAPY: CPT

## 2025-05-06 PROCEDURE — 99232 SBSQ HOSP IP/OBS MODERATE 35: CPT | Performed by: NURSE PRACTITIONER

## 2025-05-06 PROCEDURE — 97530 THERAPEUTIC ACTIVITIES: CPT

## 2025-05-06 PROCEDURE — 97110 THERAPEUTIC EXERCISES: CPT

## 2025-05-06 PROCEDURE — 97535 SELF CARE MNGMENT TRAINING: CPT

## 2025-05-06 RX ORDER — ACETAMINOPHEN 325 MG/1
650 TABLET ORAL EVERY 6 HOURS PRN
Start: 2025-05-06

## 2025-05-06 RX ORDER — NIFEDIPINE 30 MG/1
30 TABLET, EXTENDED RELEASE ORAL
Status: CANCELLED
Start: 2025-05-06

## 2025-05-06 RX ORDER — ACETAMINOPHEN 325 MG/1
650 TABLET ORAL EVERY 6 HOURS PRN
Status: CANCELLED
Start: 2025-05-06

## 2025-05-06 RX ORDER — ALLOPURINOL 100 MG/1
100 TABLET ORAL DAILY
Start: 2025-05-06 | End: 2026-01-07

## 2025-05-06 RX ORDER — ATORVASTATIN CALCIUM 80 MG/1
80 TABLET, FILM COATED ORAL DAILY
Start: 2025-05-06

## 2025-05-06 RX ORDER — SEVELAMER HYDROCHLORIDE 800 MG/1
1600 TABLET, FILM COATED ORAL
Qty: 180 TABLET | Refills: 0 | Status: SHIPPED | OUTPATIENT
Start: 2025-05-06

## 2025-05-06 RX ORDER — GABAPENTIN 100 MG/1
100 CAPSULE ORAL 3 TIMES WEEKLY
Qty: 30 CAPSULE | Refills: 0 | Status: CANCELLED | OUTPATIENT
Start: 2025-05-07

## 2025-05-06 RX ORDER — NIFEDIPINE 30 MG/1
30 TABLET, EXTENDED RELEASE ORAL
Qty: 30 TABLET | Refills: 0 | Status: SHIPPED | OUTPATIENT
Start: 2025-05-06

## 2025-05-06 RX ADMIN — ACETAMINOPHEN 975 MG: 325 TABLET, FILM COATED ORAL at 06:00

## 2025-05-06 RX ADMIN — EPOETIN ALFA 8000 UNITS: 4000 SOLUTION INTRAVENOUS; SUBCUTANEOUS at 17:12

## 2025-05-06 RX ADMIN — DOCUSATE SODIUM 100 MG: 100 CAPSULE, LIQUID FILLED ORAL at 08:30

## 2025-05-06 RX ADMIN — SEVELAMER HYDROCHLORIDE 1600 MG: 800 TABLET ORAL at 17:35

## 2025-05-06 RX ADMIN — SEVELAMER HYDROCHLORIDE 1600 MG: 800 TABLET ORAL at 11:23

## 2025-05-06 RX ADMIN — DOCUSATE SODIUM 100 MG: 100 CAPSULE, LIQUID FILLED ORAL at 17:35

## 2025-05-06 RX ADMIN — NIFEDIPINE 30 MG: 30 TABLET, FILM COATED, EXTENDED RELEASE ORAL at 17:35

## 2025-05-06 RX ADMIN — CALCIUM CARBONATE (ANTACID) CHEW TAB 500 MG 500 MG: 500 CHEW TAB at 15:07

## 2025-05-06 RX ADMIN — SEVELAMER HYDROCHLORIDE 1600 MG: 800 TABLET ORAL at 08:30

## 2025-05-06 RX ADMIN — METHOCARBAMOL 1000 MG: 500 TABLET ORAL at 21:31

## 2025-05-06 RX ADMIN — ATORVASTATIN CALCIUM 80 MG: 80 TABLET, FILM COATED ORAL at 17:35

## 2025-05-06 RX ADMIN — ACETAMINOPHEN 975 MG: 325 TABLET, FILM COATED ORAL at 21:13

## 2025-05-06 RX ADMIN — SENNOSIDES 17.2 MG: 8.6 TABLET, FILM COATED ORAL at 11:23

## 2025-05-06 RX ADMIN — ACETAMINOPHEN 975 MG: 325 TABLET, FILM COATED ORAL at 13:03

## 2025-05-06 RX ADMIN — ALLOPURINOL 100 MG: 100 TABLET ORAL at 08:30

## 2025-05-06 RX ADMIN — OXYCODONE HYDROCHLORIDE 10 MG: 10 TABLET ORAL at 06:00

## 2025-05-06 RX ADMIN — METHOCARBAMOL 1000 MG: 500 TABLET ORAL at 06:00

## 2025-05-06 RX ADMIN — Medication 6 MG: at 21:13

## 2025-05-06 NOTE — PROGRESS NOTES
Progress Note - Nephrology   Name: Naresh Carpio 64 y.o. male I MRN: [id number]  Unit/Bed#: -01 I Date of Admission: 4/11/2025   Date of Service: 5/6/2025 I Hospital Day: 25    Assessment & Plan  ESRD (end stage renal disease) (HCC)  -Outpatient unit [facility]  - Access-right IJ PermCath  - Last dialysis 5/3 with postweight 92.4 kg  - 5/6/dialysis planned for today.  Electrolytes from yesterday reviewed and mild hyponatremia likely in the setting of volume overload.  Potassium was appropriate.  Hemoglobin 9.7 slowly improving.    Plan  -Plan for dialysis today  - Goal ultrafiltration to dry weight and may consider challenging by 500 cc depending on patient's preweight  - Noted case management on board for outpatient dialysis placement    Electrolyte imbalance risk  Sodium borderline low but will monitor for now with ultrafiltration  Primary hypertension  Blood pressure is relatively appropriate with nifedipine  - Also monitor with ultrafiltration  At risk for acid-base imbalance  Bicarbonate appropriate  Anemia due to chronic kidney disease, on chronic dialysis (HCC)  -Continue Epogen with dialysis    Traumatic retroperitoneal hematoma  -Status post IR embolization  Secondary hyperparathyroidism (HCC)  -Continue phosphorus binders with sevelamer-      I have reviewed the nephrology recommendations including dialysis today, with primary team advanced practitioner, and we are in agreement with renal plan including the information outlined above.     Subjective     Brief History of Admission -64-year-old male with a past medical history of ESRD on hemodialysis, who initially presented to the hospital at the end of March after a fall and after missing dialysis for 2 treatments prior to admission.  There was finding of acute L2 vertebral fracture and was also found to have retroperitoneal hemorrhage and was transferred for further evaluation to [facility] and underwent IR embolization at [facility]  [facility].    Overnight no acute issues.  Completed rehab earlier today.  Blood pressures 140-150 systolic.  Afebrile.  On room air.  Patient denies complaints currently with the exception of weakness.    Objective :  Temp:  [97.7 °F (36.5 °C)-98.3 °F (36.8 °C)] 97.7 °F (36.5 °C)  HR:  [58-61] 58  BP: (148-151)/(58-65) 151/58  Resp:  [16-18] 17  SpO2:  [98 %] 98 %  O2 Device: None (Room air)    Current Weight: Weight - Scale: 96.6 kg (213 lb)  First Weight: Weight - Scale: 98.9 kg (218 lb)  I/O         05/04 0701  05/05 0700 05/05 0701  05/06 0700 05/06 0701  05/07 0700    P.O. 460 570 180    I.V. (mL/kg)       Total Intake(mL/kg) 460 (4.8) 570 (5.9) 180 (1.9)    Urine (mL/kg/hr) 160 (0.1) 150 (0.1)     Other       Total Output 160 150     Net +300 +420 +180           Unmeasured Urine Occurrence  1 x           Physical Exam  General: NAD  Skin: no rash  Eyes: anicteric sclera  ENT: moist mucous membrane  Neck: supple  Chest: CTA b/l, no ronchii, no wheeze, no rubs, no rales  CVS: s1s2, no murmur, no gallop, no rub  Abdomen: soft, nontender, nl sounds  Extremities: 2+ edema LE b/l, tunneled dialysis catheter site without erythema or drainage  : no gaitan  Neuro: AAOX3  Psych: normal affect    Medications:    Current Facility-Administered Medications:     acetaminophen (TYLENOL) tablet 975 mg, 975 mg, Oral, Q8H KEMAL, Damian Cerda MD, 975 mg at 05/06/25 0600    allopurinol (ZYLOPRIM) tablet 100 mg, 100 mg, Oral, Daily, Damian Cerda MD, 100 mg at 05/06/25 0830    apixaban (ELIQUIS) tablet 5 mg, 5 mg, Oral, BID, Damian Cerda MD, 5 mg at 05/04/25 1754    atorvastatin (LIPITOR) tablet 80 mg, 80 mg, Oral, Daily With Dinner, Damian Cerda MD, 80 mg at 05/05/25 1705    bisacodyl (DULCOLAX) rectal suppository 10 mg, 10 mg, Rectal, Daily PRN, Michelle Landa DO, 10 mg at 04/18/25 0000    calcium carbonate (TUMS) chewable tablet 500 mg, 500 mg, Oral, Daily PRN, GRANT Church, 500 mg at 05/03/25 1205     docusate sodium (COLACE) capsule 100 mg, 100 mg, Oral, BID, Michelle Landa DO, 100 mg at 05/06/25 0830    epoetin jessica (EPOGEN,PROCRIT) injection 8,000 Units, 8,000 Units, Intravenous, After Dialysis, German Linton MD, 8,000 Units at 05/03/25 1015    gabapentin (NEURONTIN) capsule 100 mg, 100 mg, Oral, Once per day on Monday Wednesday Friday, Damian Cerda MD, 100 mg at 05/05/25 0820    lidocaine (LIDODERM) 5 % patch 1 patch, 1 patch, Topical, Daily PRN, Damian Cerda MD, 1 patch at 04/18/25 2135    melatonin tablet 6 mg, 6 mg, Oral, HS, Jayden Rowland MD, 6 mg at 05/05/25 2126    methocarbamol (ROBAXIN) tablet 1,000 mg, 1,000 mg, Oral, Q8H KEMAL, Jayden Rowland MD, 1,000 mg at 05/06/25 0600    naloxone (NARCAN) 0.04 mg/mL syringe 0.04 mg, 0.04 mg, Intravenous, Q1MIN PRN, Damian Cerda MD    NIFEdipine (PROCARDIA XL) 24 hr tablet 30 mg, 30 mg, Oral, After Dinner, Damian Cerda MD, 30 mg at 05/05/25 1705    ondansetron (ZOFRAN-ODT) dispersible tablet 4 mg, 4 mg, Oral, Q6H PRN, Gino Austin PA-C, 4 mg at 04/20/25 0023    oxyCODONE (ROXICODONE) IR tablet 5 mg, 5 mg, Oral, Q4H PRN, 5 mg at 05/04/25 2122 **OR** oxyCODONE (ROXICODONE) immediate release tablet 10 mg, 10 mg, Oral, Q4H PRN, Damian Cerda MD, 10 mg at 05/06/25 0600    oxyCODONE (ROXICODONE) split tablet 2.5 mg, 2.5 mg, Oral, Q8H PRN, Jayden Rowland MD, 2.5 mg at 05/03/25 1026    polyethylene glycol (MIRALAX) packet 17 g, 17 g, Oral, Daily PRN, Michelle Landa DO, 17 g at 04/26/25 1259    senna (SENOKOT) tablet 17.2 mg, 2 tablet, Oral, Daily With Lunch, Michelle Landa DO, 17.2 mg at 05/06/25 1123    sevelamer (RENAGEL) tablet 1,600 mg, 1,600 mg, Oral, TID With Meals, Damian Cerda MD, 1,600 mg at 05/06/25 1123    white petrolatum-mineral oil (EUCERIN,HYDROCERIN) cream, , Topical, TID PRN, Michelle Landa DO      Lab Results: I have reviewed the following results:  Results from last 7 days   Lab Units 05/05/25  0532 05/04/25  9494  "04/29/25  1350   WBC Thousand/uL  --  5.38 7.55   HEMOGLOBIN g/dL  --  9.7* 8.0*   HEMATOCRIT %  --  32.2* 26.3*   PLATELETS Thousands/uL  --  214 224   POTASSIUM mmol/L 4.1 4.2 5.5*   CHLORIDE mmol/L 95* 95* 96   CO2 mmol/L 24 22 23   BUN mg/dL 31* 24 60*   CREATININE mg/dL 6.09* 4.70* 8.38*   CALCIUM mg/dL 9.0 9.3 9.2   ALBUMIN g/dL  --  4.2  --        Administrative Statements     Portions of the record may have been created with voice recognition software. Occasional wrong word or \"sound a like\" substitutions may have occurred due to the inherent limitations of voice recognition software. Read the chart carefully and recognize, using context, where substitutions have occurred.If you have any questions, please contact the dictating provider.  "

## 2025-05-06 NOTE — PROGRESS NOTES
"Progress Note - Hospitalist   Name: Naresh Carpio 64 y.o. male I MRN: [id number]  Unit/Bed#: -01 I Date of Admission: 4/11/2025   Date of Service: 5/6/2025 I Hospital Day: 25    Assessment & Plan  Traumatic retroperitoneal hematoma  Admitted to the trauma service after a fall with L2 vertebral body and retroperitoneal hematoma.  S/p IR embolization of distal right deep circumflex iliac artery/distal right superior gluteal artery both of which supplied an area of active extravasation and then pseudoaneurysms present at distal branches of the left inferior gluteal artery. The artery was embolized using coils and gelfoam.   He was type and screened 4/17/25 in case needed blood during HD on that day.  Hgb on 5/4/25 was 9.7  Anemia due to chronic kidney disease, on chronic dialysis ([facility])  Had 5 U PRBCs during hospital stay  On  4/15/24 was 7.2 ---> Renal increased his Epogen  CBC 4/29/25 = 8.0/stable  Hgb improved and on 5/4/25 was up to 9.7  ESRD (end stage renal disease) ([facility])  Continue Sevelamer TID with meals  Renal following  HD T-TH-Sat while here in rehab  Paroxysmal atrial fibrillation ([facility])  On no rate control medications  Examines RRR  Anticoagulation with Eliquis as at home but he doesn't want to take it because he is worried it will make his right eye vision worse.  I d/w on call Optho Dr Diaz = \"The risk is not quantified in the literature. Dr edmond's note suggests a diagnosis of proliferative diabetic retinopathy and bilateral and recurrent vitreous hemorrhage. So he is at risk for - and has had hemorrhage - whether or not he is on eliquis (assuming dr edmond is correct).     I would always recommend life and death are more important so to do what you recommend re his systemic condition, and then to follow up with dr edmond as he recommended so a real exam can be done in the clinic\".   Explained all this to him and he understands  He had been taking the Eliquis again and now refusing it " "because he is afraid of his foot bleeding.  He says \"no point in discussing it\"; I d/w him but again refuses to take it.  He is fully aware that he may have a stroke if he does not take the Eliquis  He did take 1 dose 5/3/25 and both doses 5/4/25 but none since.  Chronic diastolic (congestive) heart failure (HCC)  Bilateral LE edema = chronic/stable  Renal diet  Volume management via HD  Primary hypertension  Continue Procardia XL 30 mg qd  Tx per renal  Stable; no changes today 5/6/25  Visual disturbance  Reported a right eye \"floater\" without associated pain earlier in his hospitalization which has resolved but continued to have blurry central vision.  Ophthalmology saw here in [facility] = visual acuity without correction is 20/400 OU. Dx: Proliferative diabetic retinopathy with vitreous hemorrhage OU as well as cataracts.   Will need outpatient follow up immediately after discharge from rehab  Diabetic ulcer of left midfoot associated with type 2 diabetes mellitus, limited to breakdown of skin (HCC)  Continue local care  Being followed by WC  Last LEADS was 3/28/25 = 50-75% right prox polital and prox tibioperoneal trunk; >75% stenosis in prox popliteal artery on left  Was seen by VS in 12/2024 hospital stay and he declined any intervention at the time  Podiatry saw here on 4/22/25, s/p debridement = no surgical intervention needed at this time  Had new bleeding from foot wound 4/27/25. The bleeding then resolved. Podiatry was made aware.  Not sure wound is actually from DM but more likely PAD.  He has normal BSs here and is not on any DM meds.  +pre-DM PTA  Hyponatremia  Per Nephrology  Seems to chronically run 129-130  Continue fluid restriction 1200 cc per 24 hours  Closed fracture of proximal end of left humerus with routine healing  Occurred from a fall in February and eval'd at the time of the injury  Was to continue NWB/sling and followup with Ortho but there was no followup done  Xray 4/13/25 = \"Unchanged " "alignment of the chronic greater tuberosity avulsion fracture\".   Ortho saw here on 4/14 = WBAT; see Dr Cisneros 4 weeks @ 5/14/25  Gout  Continue Allopurinol  L2 vertebral fracture ([facility])  CT CAP 4/2: Fracture of L2 vertebral body extending to the superior endplate  Continue LSO brace  Insomnia  Patient reports not sleeping well at night because he has trouble getting comfortable. He has been trying to sleep in the recliner   PAD (peripheral artery disease) ([facility])  Vascular surgery saw per Podiatry's recommendation = they will see in the office.  They advised start ASA but after the d/w the PA in relation to his vitreous hemorrhages, can hold off on that and d/w him in the office.  This will give him time to see the Retina specialist as OP as about getting tx  Type 2 diabetes mellitus with foot ulcer, without long-term current use of insulin ([facility])  HgbA1C was 5.7 on 2/9/25  Not on medications at here/home   FBSs have been normal  On Regular diet  Hallucinations  Nursing reported he was confused/hallucinating late night Saturday into Sunday.  He was seeing people in his room that weren't there.   STAT head CT 5/3/25: no acute findings  Nursing reported he was having pain in dialysis on 5/3/25 so he was given OxyIR at 0930  and then again at 1030 because it hadn't improved with the first dose.  This may have contributed to his increased confusion  Has been afebrile and WBC normal  Back to baseline currently    The above assessment and plan was reviewed and updated as determined by my evaluation of the patient on 5/6/2025.    History of Present Illness   Patient seen and examined. Patients overnight issues or events were reviewed with nursing staff. New or overnight issues include the following:   No new or overnight issues.  Offers no complaints    Review of Systems   All other systems reviewed and are negative.      Objective :  Temp:  [97.7 °F (36.5 °C)-98.3 °F (36.8 °C)] 97.7 °F (36.5 °C)  HR:  [58-61] 58  BP: " (148-151)/(58-65) 151/58  Resp:  [16-18] 17  SpO2:  [98 %] 98 %  O2 Device: None (Room air)    Invasive Devices       Hemodialysis Catheter  Duration             HD Permanent Double Catheter 615 days                    Physical Exam  General Appearance: no distress, nontoxic appearing  HEENT:  External ear normal.  Nose normal w/o drainage. Mucous membranes are moist. Oropharynx is clear. Conjunctiva clear w/o icterus or redness.  Neck:  Supple, normal ROM  Lungs: BBS without crackles/wheeze/rhonchi; respirations unlabored with normal inspiratory/expiratory effort.  No retractions noted.  On RA  CV: regular rate and rhythm; no rubs/murmurs/gallops, PMI normal   ABD: Abdomen is soft.  Bowel sounds all quadrants.  Nontender with no distention.    EXT: no edema  Skin: normal turgor, normal texture  Psych: affect normal, mood normal  Neuro: AAO       The above physical exam was reviewed and updated as determined by my evaluation of the patient on 5/6/2025.      Lab Results: I have reviewed the following results:  Results from last 7 days   Lab Units 05/04/25  0945 04/29/25  1350   WBC Thousand/uL 5.38 7.55   HEMOGLOBIN g/dL 9.7* 8.0*   HEMATOCRIT % 32.2* 26.3*   PLATELETS Thousands/uL 214 224     Results from last 7 days   Lab Units 05/05/25  0532 05/04/25  0945   SODIUM mmol/L 130* 129*   POTASSIUM mmol/L 4.1 4.2   CHLORIDE mmol/L 95* 95*   CO2 mmol/L 24 22   BUN mg/dL 31* 24   CREATININE mg/dL 6.09* 4.70*   CALCIUM mg/dL 9.0 9.3                   Imaging Results Review: No pertinent imaging studies reviewed.  Other Study Results Review: No additional pertinent studies reviewed.    Review of Scheduled Meds: Medications  reviewed and reconciled as needed  Current Facility-Administered Medications   Medication Dose Route Frequency Provider Last Rate    acetaminophen  975 mg Oral Q8H [facility] Damian Cerda MD      allopurinol  100 mg Oral Daily Damian Cerda MD      apixaban  5 mg Oral BID Damian Cerda MD      atorvastatin   80 mg Oral Daily With Dinner Damian Cerda MD      bisacodyl  10 mg Rectal Daily PRN Michelle T Landa, DO      calcium carbonate  500 mg Oral Daily PRN GRANT Church      docusate sodium  100 mg Oral BID Michelle T Landa, DO      epoetin jessica  8,000 Units Intravenous After Dialysis German Linton MD      gabapentin  100 mg Oral Once per day on Monday Wednesday Friday Damian Cerda MD      lidocaine  1 patch Topical Daily PRN Damian Cerda MD      melatonin  6 mg Oral HS Jayden Rowland MD      methocarbamol  1,000 mg Oral Q8H KEMAL Jayden Rowland MD      naloxone  0.04 mg Intravenous Q1MIN PRN Damian Cerda MD      NIFEdipine  30 mg Oral After Dinner Damian Cerda MD      ondansetron  4 mg Oral Q6H PRN Gino Austin PA-C      oxyCODONE  5 mg Oral Q4H PRN Damian Cerda MD      Or    oxyCODONE  10 mg Oral Q4H PRN Damian Cerda MD      oxyCODONE  2.5 mg Oral Q8H PRN Jayden Rowland MD      polyethylene glycol  17 g Oral Daily PRN Michelle Landa, DO      senna  2 tablet Oral Daily With Lunch Michelle Landa, DO      sevelamer  1,600 mg Oral TID With Meals Damian Cerda MD      white petrolatum-mineral oil   Topical TID PRN Michelle Landa, DO         VTE Pharmacologic Prophylaxis: Eliquis  Code Status: Level 1 - Full Code  Current Length of Stay: 25 day(s)    Administrative Statements     ** Please Note:  voice to text software may have been used in the creation of this document. Although proof errors in transcription or interpretation are a potential of such software**

## 2025-05-06 NOTE — PROGRESS NOTES
"OT daily treatment note       05/06/25 0700   Pain Assessment   Pain Assessment Tool 0-10   Pain Score 6   Pain Location/Orientation Location: Back   Hospital Pain Intervention(s) Ambulation/increased activity   Restrictions/Precautions   Precautions Fall Risk;Pain;Pressure Ulcer;Spinal precautions   Braces or Orthoses LSO   Lifestyle   Autonomy \"Between you and I, I dont feel ready to go home and I dont think I ever will until my eyes are better\"   Eating   Type of Assistance Needed Independent   Physical Assistance Level No physical assistance   Eating CARE Score 6   Oral Hygiene   Type of Assistance Needed Independent   Physical Assistance Level No physical assistance   Comment in stance at sink   Oral Hygiene CARE Score 6   Shower/Bathe Self   Type of Assistance Needed Independent   Physical Assistance Level No physical assistance   Comment SB seated w/ use of blue wipes; refuses to use a basin/wash clothes   Shower/Bathe Self CARE Score 6   Upper Body Dressing   Type of Assistance Needed Independent   Physical Assistance Level No physical assistance   Comment shirt and LSO donning/doffing   Upper Body Dressing CARE Score 6   Lower Body Dressing   Type of Assistance Needed Independent   Physical Assistance Level No physical assistance   Comment inc time   Lower Body Dressing CARE Score 6   Putting On/Taking Off Footwear   Type of Assistance Needed Physical assistance   Physical Assistance Level 51%-75%   Comment TA for ace wraps; can doff socks; TA to don socks although pt continues to report he will be able to do it at home although he does not want to simulate how he would do it at home here   Putting On/Taking Off Footwear CARE Score 2   Sit to Stand   Type of Assistance Needed Independent   Physical Assistance Level No physical assistance   Comment RW   Sit to Stand CARE Score 6   Bed-Chair Transfer   Type of Assistance Needed Independent   Physical Assistance Level No physical assistance   Comment RW "   Chair/Bed-to-Chair Transfer CARE Score 6   Toileting Hygiene   Type of Assistance Needed Independent   Physical Assistance Level No physical assistance   Toileting Hygiene CARE Score 6   Toilet Transfer   Type of Assistance Needed Independent   Physical Assistance Level No physical assistance   Comment RW; standard toilet height   Toilet Transfer CARE Score 6   Commmunity Re-entry   Community Re-entry Level Walker   Community Re-entry Level of Assistance Independent   Community Re-entry Pt participated in functional mobility task with use of RW to simulate home and community distances required for ADL/IADL completion. Task focused on increasing functional activity tolerance, endurance, strength and dynamic standing balance to improve independence with ADL completion.   Exercise Tools   UE Ergometer Pt engaged in UE ergometer for 5 minutes in prograde and 5 minutes in retrograde with Moderate resistance focusing on increasing pts UB strength, endurance and functional activity tolerance to increase independence with ADL tasks. Short rest break required in between sets for fatigue mgmt. Education provided on energy conservation and breathing techniques with Positive carryover of provided education.   Cognition   Overall Cognitive Status WFL   Arousal/Participation Cooperative   Attention Attends with cues to redirect   Orientation Level Oriented to person;Oriented to place   Memory Decreased short term memory;Decreased recall of recent events;Decreased recall of precautions   Following Commands Follows one step commands with increased time or repetition   Activity Tolerance   Activity Tolerance Patient tolerated treatment well   Assessment   Treatment Assessment Pt participated in skilled OT session focusing on ADL performance in prep for DC home tmrw. Pt has made good  progress during time of stay at the [facility]. Pt is overall functioning at Independent  for ADLs, Independent  for functional transfers and Independent   for functional mobility. Prior to DC Home, DME recommendations include bedside commode, long handled equipment , and RW. DME was ordered to maximize functional independence and decrease fall risk. Based on pts functional performance, pt is safe to DC Home w/ recommendations noted above. Pt would benefit from continued OT services in home health to maximize functional abilities.   Barriers to Discharge None   OT Therapy Minutes   OT Time In 0700   OT Time Out 0830   OT Total Time (minutes) 90   OT Mode of treatment - Individual (minutes) 90   OT Mode of treatment - Concurrent (minutes) 0   OT Mode of treatment - Group (minutes) 0   OT Mode of treatment - Co-treat (minutes) 0   OT Mode of Treatment - Total time(minutes) 90 minutes   OT Cumulative Minutes 1750

## 2025-05-06 NOTE — ASSESSMENT & PLAN NOTE
"- Reporting \"floater\" within the right eye with acute onset on 4/11/25 at mid-day while watching TV; describes a dark, \"spider\"-shaped floater that occupies a significant portion of the visual field, painless; has similar floaters in the left eye but smaller  - Ophthalmology consulted on [facility] admission by IM, per prior trauma surgery documentation, Dr. Chaudhry of ophthalmology was made personally aware of clinical situation on 4/11/25 prior to [facility] admission  > Ophthalmology consult 4/12: Exam c/w proliferative diabetic retinopathy with vitreous hemorrhage. Pt will follow up with ophtho immediately after discharge and for referral to a retina specialist  > 4/15- Pt thinks his R eye floater may be larger today, unable to clearly state or quantify symptoms. No other visual complaints of increased number of floaters, double/blurry vision, or bright flashes. Advised patient to inform us if symptoms change - will reach out to Ophthalmology accordingly  > 4/18- No changes noted in vision/floaters. His overall impaired vision continues to be a barrier for his ADLs/iADLs  > 4/20-24- Stable R eye floater    - Monitor for changes in vision  "

## 2025-05-06 NOTE — PROGRESS NOTES
Progress Note - PMR   Name: Naresh Carpio 64 y.o. male I MRN: [id number]  Unit/Bed#: -01 I Date of Admission: 4/11/2025   Date of Service: 5/6/2025 I Hospital Day: 25     Assessment & Plan  Traumatic retroperitoneal hematoma  - Sustained from mechanical fall several days prior to initial presentation on 4/2/25, presenting with progressively worsening low back pain. Trauma imaging revealed a large retroperitoneal hematoma with active extravasation and hemoglobin drop. Now s/p transfusion, Kcentra and ultimately IR embolization of the right deep circumflex iliac artery and superior gluteal artery with Dr. Garcia on 4/3/25. Unfortunately, on 4/6/25, his hemoglobin decreased and repeat CT showed a 5 mm inferior gluteal artery branch pseudoaneurysm and he is s/p IR pelvic angiogram on 4/7/25 with Dr. Hernandez, which demonstrated pseudoaneurysms at the distal branches of the left inferior gluteal artery which was embolized.   > 4/15: Hgb 7.2 today (from 7.6 yesterday). Back pain seems controlled. Will be receiving increased Epogen today per IM/Nephro  > Hgb 9.7 on 5/4 labs    - Continue to trend Hgb on CBC closely  - Consider repeat imaging if Hgb continues to downtrend or pain significantly worsens  - Contact IR if there are additional bleeding concerns given multiple prior interventions described above  - Local right hip/groin access site incision care  - Analgesia, see below  - PT and OT  Acute pain due to trauma  > Evaluated by APS on acute care  - Tylenol 975 mg q8h scheduled  - Gabapentin 100 mg three times weekly  - Methocarbamol 1000 mg q8h scheduled  - Lidocaine patches daily PRN  - Oxycodone 5 q4h PRN with 2.5mg Q8 for breakthrough pain, wean as possible  Impaired mobility and activities of daily living  - Rehabilitation medicine physician for daily monitoring of care, 24 hour availability for acute medical issues, medication management, and therapeutic and diagnostic assessments.  - 24 hour rehabilitation  nursing 7 days per week for: management/teaching of medications, bowel/bladder routine, skin care.  - PT, OT for 2-3 hours per day, 5 to 6 days per week; 15 hours per week  - Rehabilitation Psychology as needed for adjustment and coping  - CM for barriers to discharge, community resources, and family support  - Discharge planning following to help ensure a safe and efficient discharge  - 900/7 program due to HD status, pain, expected therapy tolerance  4/18- Ongoing dispo planning with patient/sister and CM. Staff expressed that patient is progressing well from a functional standpoint but continues to have difficulties with ADLs/iADLs due to impaired vision. Will continue to work towards goals for safe dischare  4/22- DC date now pending outpatient HD setup  5/5- Outpatient HD chair time is set up. CM working on transportation and [facility]  L2 vertebral fracture (HCC)  > Sustained from a mechanical fall on 3/24/25, found to have an acute, obliquely oriented fracture of the L2 vertebral body with distraction of the dominant fracture fragment and involvement of the anterior and posterior cortices and superior endplate  > At that time, managed non-operatively with LSO bracing, though it does not appear orthopedic spine surgery or neurosurgery evaluated at the time of injury    - Maintain LSO brace with lumbar spinal precautions   - Should follow-up with spine surgery outpatient  Closed fracture of proximal end of left humerus with routine healing  - Sustained from fall in early February, per CT of the LUE on 2/9/25, there was a minimally displaced fracture of the anterior facet of the greater tuberosity of the left humerus, minimally apparent on repeat x-ray of the shoulder on 3/24/25  - He was evaluated by orthopedic surgery at the time of the initial injury and was recommended to maintain NWB through the LUE with a sling  - There is no further orthopedic evaluation documentation and it does not appear that further  follow-up occurred.  - Clinically, he is not describing ongoing significant left arm or shoulder pain and has been weight-bearing through the LUE  > 4/14: Repeat humerus X-ray done yesterday shows no change in fracture alignment. Ortho consulted.  >4/15: Ortho c/s: May WBAT to LUE. PT/OT to increase strength and ROM. F/u with Dr. Cisneros in 4 weeks    - Monitor  - PT/OT  Insomnia  Patient reports he has an overall a poor sleep hygiene: he sleeps at 1-2am nightly and watches TV up until bed.  Advised patient to attempt to regulate sleep/wake cycle while here to better participate in therapy. Attempt to try to sleep earlier around midnight  >4/15: reviewed sleep log: Pt went to sleep around midnight and slept for 6 hours.  > 4/17- Poor sleep last night: was awake every other hour. Requiring nap during the day. Can increase melatonin tomorrow if this persists  Pt endorses moving from bed to chair multiple times throughout the night which is his baseline at home too    - Continue sleep log  - Continue melatonin 6mg nightly  At risk for constipation   4/15- Adjusted bowel medications: Colace 100mg BID, Senna 17.2mg with lunch and PRN Miralax/Suppos  4/17- No BM since 4/13. Lactulose this evening if no BM after lunch. Can consider Relistor if no success for opioid-inducted constipation  4/18- small hard BMs x2 yesterday after lactulose and suppository. Continue bowel regimen  >BM 4/22 but required Relistor and suppository/lactulose  > Last BM 5/3    - Will follow BMs and adjust bowel regimen to target soft, formed stools q1-2 days, per pt's regular schedule.  Anemia due to chronic kidney disease, on chronic dialysis (HCC)  > 4/14: Hgb 7.6  > 4/15: Hgb 7.2 - Discussed with IM who is coordinating with Nephrology; they will increase his Epoetin jessica  > 4/17: Hgb 7.7  > 4/29: Hgb 8.0  > Hgb 9.7 on 5/4 labs    - Trend Hgb on CBC  - Epoetin jessica with dialysis  - Transfusions per IM, appreciate their recommendations  Visual  "disturbance  - Reporting \"floater\" within the right eye with acute onset on 4/11/25 at mid-day while watching TV; describes a dark, \"spider\"-shaped floater that occupies a significant portion of the visual field, painless; has similar floaters in the left eye but smaller  - Ophthalmology consulted on [facility] admission by IM, per prior trauma surgery documentation, Dr. Chaudhry of ophthalmology was made personally aware of clinical situation on 4/11/25 prior to [facility] admission  > Ophthalmology consult 4/12: Exam c/w proliferative diabetic retinopathy with vitreous hemorrhage. Pt will follow up with ophtho immediately after discharge and for referral to a retina specialist  > 4/15- Pt thinks his R eye floater may be larger today, unable to clearly state or quantify symptoms. No other visual complaints of increased number of floaters, double/blurry vision, or bright flashes. Advised patient to inform us if symptoms change - will reach out to Ophthalmology accordingly  > 4/18- No changes noted in vision/floaters. His overall impaired vision continues to be a barrier for his ADLs/iADLs  > 4/20-24- Stable R eye floater    - Monitor for changes in vision  Diabetic ulcer of left midfoot associated with type 2 diabetes mellitus, limited to breakdown of skin (HCC)  - Has chronic wounds affecting the left foot plantar surface, left foot 1st and 2nd digits  - Left Plantar Foot Wounds and Left toes:  Cleanse with NSS and pat dry. Paint all wounds with Betadine daily and cover plantar open wound with silicone bordered foam dressing. Naren with T for Treatment and change every other day or PRN   > 4/22 : Wound care recs: Paint left foot 1st and 2nd digits with betadine dailyand covering plantar open wound with foam. Podiatry consulted   > 4/22: Podiatry did bedside debridement of left foot plantar ulcer. Continue with local wound care, no surgical intervention needed. SALVATORE ordered and vascular surgery consulted. Pt to maintain frequent LLE " elevation, strict pressure and fracture reduction. WBAT to LLE  > 4/24: Pictures of LLE reviewed on chart. Wounds are stable with no signs of infection. XR L foot 4/22 with no evidence of OM. ABIs and toe pressures are within healing range. Pt to continue to follow up with wound care for further management  > 5/6- Bleeding from L foot wound requiring dressing changes. Pt denies any trauma to the foot. He is currently refusing Eliquis due to this bleeding. Continue dressing changes PRN   PAD (peripheral artery disease) ([facility])  > Non invasive arterial studies from 3/28:  Diffuse disease noted throughout the femoral-popliteal arteries. Evidence of 50-75% stenosis in the proximal popliteal artery. Evidence of 50-75% stenosis at the proximal tibio-peroneal trunk.   LLE: Diffuse disease noted throughout the femoral-popliteal arteries. Evidence of >75% stenosis in the proximal popliteal artery.  Tech Note: Patient refused SALVATORE.  SALVATORE 4/22: Left lower limb SALVATORE noncompressible, metatarsal pressure 155 mmHg, great toe pressure 72 mmHg. Right lower limb noncompressible SALVATORE, metatarsal pressure 158 mmHg, second toe pressure 101 mmHg   Vascular surgery consult pending  Hyponatremia  > 4/14: Na 129 - A&O x3, pt mentating appropriately  > 4/15: Na 125 - Discussed with IM who is coordinating with Nephrology; they will manage Na during HD   > Sodium levels continue to fluctuate from 125-131. Pt remains A&O x3. Nephrology will continue to manage electrolyte deficiencies  > 5/5- Na 130    - Trend on BMP  - Electrolyte management per nephrology  - Continue HD in setting of ESRD  ESRD (end stage renal disease) ([facility])  - Continue HD  - Nephrology consulted and following, appreciate their recommendations  >4/22: new outpatient HD center needed close to his home after discharge. CM to follow up. DC pushed  New outpatient HD location is established for discharge with transportation help through sister and [employer] when needed.  Paroxysmal  atrial fibrillation (HCC)  - Currently rate controlled  - Embolic stroke risk reduction with apixaban 5 mg BID    >4/22- Refusing it this morning. Extensive conversation had with patient to discuss the importance of compliance given his high stroke risk.   > 4/24- now compliant with Eliquis  > 5/6- Refusing eliquis due to foot wound bleeding  Chronic diastolic (congestive) heart failure (HCC)  Wt Readings from Last 3 Encounters:   05/05/25 96.6 kg (213 lb)   04/11/25 98.5 kg (217 lb 2.5 oz)   03/24/25 107 kg (234 lb 12.6 oz)     - Most recent TTE from 10/9/24 with EF 60-65%, grade 1 diastolic dysfunction  - Volume management with HD per nephrology  - Monitor weights  - Appreciate medicine and nephrology recommendations and management  Primary hypertension  - Nifedipine  - Appreciate IM and nephrology management  Gout  - Allopurinol for flare prophylaxis  Hyperphosphatemia  > 4/15: Phos 4.6  - Management per nephrology  - Continue phosphate binders  Wound of skin  - Has right anterior pre-tibial scab, wash gently daily, leave open to air  - Wound care RN consulted and following  At high risk for skin breakdown  > 4/21- New sacral pressure injury noted by staff. Pictures reviewed on chart. Patient primarily sleeps in recliner (both here and at home). Discussed with therapy and pt will use a cushion that offers sacral relief on recliner. Will continue to monitor  >4/22- Wound care recs:  Cleanse wound with NSS, pat dry. Apply calcium alginate with silver to wound bed and cover with foam. Change every other day and prn. Cushion will not be covered per therapy for home - advised patient to find one off [employer] to continue offloading     - Moisturize skin daily with skin nourishing cream  - Ehob cushion in chair when out of bed.  - Preventative hydraguard to bilateral heels BID and PRN.   - Calazime paste to sacrum/buttocks BID and PRN  - Monitor clinically for breakdown, frequent turns  - Wound care RN consulted and  "following  At risk for venous thromboembolism (VTE)  - Apixaban, see above  Hallucinations  5/5- Over the weekend patient was having hallucinations and irregular behavior. MAR reviewed and no new medications were started. Back to baseline this morning. Symptoms likely due to hospital-acquired delirium from prolonged stay, frequent oxycodone use and overall poor sleep   5/6- No hallucinations reported. Pt slept much better last night and looks alert this morning    - Monitor for any recurrence  Hyperkalemia  5/5- K 4.1  Type 2 diabetes mellitus with foot ulcer, without long-term current use of insulin (HCC)  Lab Results   Component Value Date    HGBA1C 5.7 (H) 02/09/2025       No results for input(s): \"POCGLU\" in the last 72 hours.    Blood Sugar Average: Last 72 hrs:      Secondary hyperparathyroidism (HCC)      Subjective   64-year-old male with history of chronic anemia, atrial fibrillation, diabetes, end-stage renal disease on hemodialysis presenting on 4/2 with progressively worsening back pain after a fall and found to have large retroperitoneal hematoma with active extravasation status post Kcentra stabilization with transfusions and multiple IR embolizations. Course complicated by acute on chronic pain     Chief Complaint: f/u ambulatory dysfunction    Interval: Patient seen and examined. No acute overnight events. His foot wound did have some bleeding this morning and dressing was changed. Patient is worried about discharging home and feels he is not ready. When asked to elaborate on this, he states he is concerned about falling at home. Emotional support provided and discussed extensively about discharge plan and ongoing therapies at home. Informed PT to re-address safety cues/fall precautions to help patient mentally prepare for transition home. Otherwise, denies any fevers, chills, chest pain, sob, abdominal pain, nausea/vomiting, lightheadedness or dizziness. HD later today. Eating % of his meals. " Last BM 5/3. No new labs to review today.    Objective :  Temp:  [97.7 °F (36.5 °C)-98.3 °F (36.8 °C)] 97.7 °F (36.5 °C)  HR:  [58-61] 58  BP: (148-151)/(58-65) 151/58  Resp:  [16-18] 17  SpO2:  [98 %] 98 %  O2 Device: None (Room air)    Functional Update:   Mobility: sup bed mobility. Mod Ind ambulation with ', Ind for stairs x6  Transfers: Independent with RW  ADLs: Ind oral care, Judi grooming, sup UBD    Physical Exam  Vitals reviewed.   Constitutional:       Appearance: Normal appearance.   HENT:      Head: Normocephalic and atraumatic.      Right Ear: External ear normal.      Left Ear: External ear normal.      Nose: Nose normal.      Mouth/Throat:      Mouth: Mucous membranes are moist.   Eyes:      Conjunctiva/sclera: Conjunctivae normal.   Cardiovascular:      Rate and Rhythm: Normal rate and regular rhythm.      Heart sounds: Normal heart sounds.   Pulmonary:      Effort: Pulmonary effort is normal.      Breath sounds: Normal breath sounds.   Abdominal:      General: Bowel sounds are normal.      Palpations: Abdomen is soft.   Musculoskeletal:      Right lower leg: Edema present.      Left lower leg: Edema present.      Comments: ACE wraps on bilateral LE's and c/d/I. +LSO   Skin:     General: Skin is warm and dry.   Neurological:      Mental Status: He is alert. Mental status is at baseline.   Psychiatric:         Mood and Affect: Mood normal.         Behavior: Behavior normal.       Scheduled Meds:  Current Facility-Administered Medications   Medication Dose Route Frequency Provider Last Rate    acetaminophen  975 mg Oral Q8H [facility] Damian Cerda MD      allopurinol  100 mg Oral Daily Damian Cerda MD      apixaban  5 mg Oral BID Damian Cerda MD      atorvastatin  80 mg Oral Daily With Dinner Damian Cerda MD      bisacodyl  10 mg Rectal Daily PRN Michelle T Landa,       calcium carbonate  500 mg Oral Daily PRN GRANT Church      docusate sodium  100 mg Oral BID Michelle T Landa,        epoetin jessica  8,000 Units Intravenous After Dialysis German Linton MD      gabapentin  100 mg Oral Once per day on Monday Wednesday Friday Damian Cerda MD      lidocaine  1 patch Topical Daily PRN Damian Cerda MD      melatonin  6 mg Oral HS Jayden Rowland MD      methocarbamol  1,000 mg Oral Q8H [facility] Jayden Rowland MD      naloxone  0.04 mg Intravenous Q1MIN PRN Damian Cerda MD      NIFEdipine  30 mg Oral After Dinner Damian Cerda MD      ondansetron  4 mg Oral Q6H PRN Gino Austin PA-C      oxyCODONE  5 mg Oral Q4H PRN Damian Cerda MD      Or    oxyCODONE  10 mg Oral Q4H PRN Damian Cerda MD      oxyCODONE  2.5 mg Oral Q8H PRN Jayden Rowland MD      polyethylene glycol  17 g Oral Daily PRN Michelle Landa, DO      senna  2 tablet Oral Daily With Lunch Michelle Landa, DO      sevelamer  1,600 mg Oral TID With Meals Damian Cerda MD      white petrolatum-mineral oil   Topical TID PRN Michelle Landa, DO           Lab Results: I have reviewed the following results:  Results from last 7 days   Lab Units 05/04/25  0945 04/29/25  1350   HEMOGLOBIN g/dL 9.7* 8.0*   HEMATOCRIT % 32.2* 26.3*   WBC Thousand/uL 5.38 7.55   PLATELETS Thousands/uL 214 224     Results from last 7 days   Lab Units 05/05/25  0532 05/04/25  0945 04/29/25  1350   BUN mg/dL 31* 24 60*   SODIUM mmol/L 130* 129* 132*   POTASSIUM mmol/L 4.1 4.2 5.5*   CHLORIDE mmol/L 95* 95* 96   CREATININE mg/dL 6.09* 4.70* 8.38*   AST U/L  --  14  --    ALT U/L  --  7  --

## 2025-05-06 NOTE — ASSESSMENT & PLAN NOTE
-Outpatient unit [facility]  - Access-right IJ PermCath  - Last dialysis 5/3 with postweight 92.4 kg  - 5/6/dialysis planned for today.  Electrolytes from yesterday reviewed and mild hyponatremia likely in the setting of volume overload.  Potassium was appropriate.  Hemoglobin 9.7 slowly improving.    Plan  -Plan for dialysis today  - Goal ultrafiltration to dry weight and may consider challenging by 500 cc depending on patient's preweight  - Noted case management on board for outpatient dialysis placement

## 2025-05-06 NOTE — ASSESSMENT & PLAN NOTE
- Currently rate controlled  - Embolic stroke risk reduction with apixaban 5 mg BID    >4/22- Refusing it this morning. Extensive conversation had with patient to discuss the importance of compliance given his high stroke risk.   > 4/24- now compliant with Eliquis  > 5/6- Refusing eliquis due to foot wound bleeding

## 2025-05-06 NOTE — ASSESSMENT & PLAN NOTE
5/5- Over the weekend patient was having hallucinations and irregular behavior. MAR reviewed and no new medications were started. Back to baseline this morning. Symptoms likely due to hospital-acquired delirium from prolonged stay, frequent oxycodone use and overall poor sleep   5/6- No hallucinations reported. Pt slept much better last night and looks alert this morning    - Monitor for any recurrence

## 2025-05-06 NOTE — PROGRESS NOTES
"   05/06/25 1100   Subjective   Subjective \"I don't think I am ready to go home. I don't drive and I have to get to dialysis\"   Lying to Sitting on Side of Bed   Comment attempted bed mobility as patient unsure whether he was going to sleep in his chair vs bed at home, however, unable to complete bed mobility due to report of pain. Then patient stated he would just sleep in the chair at home   Sit to Stand   Type of Assistance Needed Independent;Adaptive equipment   Comment <> RW with appropriate hand placements and no LOB   Sit to Stand CARE Score 6   Bed-Chair Transfer   Type of Assistance Needed Independent;Adaptive equipment  (RW)   Comment RW   Chair/Bed-to-Chair Transfer CARE Score 6   Walk 10 Feet   Type of Assistance Needed Independent;Adaptive equipment  (RW)   Comment with RW no evident LOB observed   Walk 10 Feet CARE Score 6   Walk 50 Feet with Two Turns   Type of Assistance Needed Independent;Adaptive equipment  (RW)   Comment Patient ambulated 50' with RW, with appropriate technique observed with turns with patient keeping RW slightly forward in order to prevent posterior LOB   Walk 50 Feet with Two Turns CARE Score 6   Walk 150 Feet   Type of Assistance Needed Independent;Adaptive equipment  (RW)   Comment Patient ambulated 150' with RW Judi with mildly antalgic gait pattern, R foot ER throughout gait cycle. No evident LOB observed or cueing required from therapist   Walk 150 Feet CARE Score 6   Ambulation   Distance Walked (feet) 266 ft   Assist Device Roller Walker   Walk Assist Level Modified Independent   Does the patient walk? 2. Yes   Wheelchair mobility   Does the patient use a wheelchair? 0. No   Curb or Single Stair   Style negotiated Curb   Type of Assistance Needed Independent;Adaptive equipment   Comment Patient able to negotiate curb with RW and no VC needed for sequencing or safety as well as no physical assist needed   1 Step (Curb) CARE Score 6   4 Steps   Type of Assistance Needed " Independent;Adaptive equipment  (Unilateral HR)   Comment BUE on unilateral HR with R step to pattern   4 Steps CARE Score 6   12 Steps   Type of Assistance Needed Adaptive equipment;Independent  (Unilateral HR)   Comment both hands on unilateral hand rail with L step to pattern for ascent and descent   12 Steps CARE Score 6   Picking Up Object   Type of Assistance Needed Adaptive equipment;Independent   Comment RW and reacher for safety and due to spinal precautions. Patient able to manipulate reacher and  yellow cone and hand to therapist without difficulty   Picking Up Object CARE Score 6   Therapeutic Interventions   Strengthening seated LAQ 2x20 2# ankle weights, seated marches 2x20 2#, hip adduction ball squeezes   Other Cone weaving x 5 laps with increased time for task due to management of RW and difficulty seeing where cones are. 2x short ambulation with turns in order to improve safety with turning RW and decrease fall risk with patient demonstrating improved recall of keeping RW in front of him to avoid upright posturing and retropulsion   Other Comments   Comments Patient expressing concerns regarding possible discharge tomorrow. Educated and re-emphasized to patient PT recommendations to use RW at all times, indoor and outdoor for increased balance and safety   Assessment   Treatment Assessment Pt participated in skilled PT session focusing on ambulatory tolerance, stair negotiation, balance, RW management, and reacher management in anticipation of discharge tomorrow back to home. Patient is independent in all functional mobility assessed. Limited ambulation trials within session today due to continued intermittent bleeding of the R foot to prevent it from continuing to bleed. Based on pts functional performance, he is safe to discharge to home w/ recommendation of RW.   Plan   Progress Progressing toward goals   PT Therapy Minutes   PT Time In 1000   PT Time Out 1130   PT Total Time (minutes) 90    PT Mode of treatment - Individual (minutes) 90   PT Mode of treatment - Concurrent (minutes) 0   PT Mode of treatment - Group (minutes) 0   PT Mode of treatment - Co-treat (minutes) 0   PT Mode of Treatment - Total time(minutes) 90 minutes   PT Cumulative Minutes 1808

## 2025-05-06 NOTE — HEMODIALYSIS
Post-Dialysis RN Treatment Note      Blood Pressure:  Pre 152/77 mm/Hg  Post 167/83 mmHg   EDW:  93.0 kg    Weight:  Pre 93.6 kg   Post 92.0 kg   Mode of weight measurement: Standing Scale   Volume Removed:  1103 ml    Treatment duration: 210 minutes    NS given:  No    Treatment shortened No   Medications given during Rx: 500 mg TUMS and 8,000 units Epogen   Estimated Kt/V:  1.12   Access type: Permacath/TDC   Access Issues: No    Report called to primary nurse:   Yes, Cecily Thomason, RN

## 2025-05-06 NOTE — PLAN OF CARE
Plan for 3.5 hour HD treatment with 1.1 kg UF as tolerated.    Problem: METABOLIC, FLUID AND ELECTROLYTES - ADULT  Goal: Electrolytes maintained within normal limits  Description: INTERVENTIONS:- Monitor labs and assess patient for signs and symptoms of electrolyte imbalances- Administer electrolyte replacement as ordered- Monitor response to electrolyte replacements, including repeat lab results as appropriate- Instruct patient on fluid and nutrition as appropriate  Outcome: Progressing  Goal: Fluid balance maintained  Description: INTERVENTIONS:- Monitor labs - Monitor I/O and WT- Instruct patient on fluid and nutrition as appropriate- Assess for signs & symptoms of volume excess or deficit  Outcome: Progressing

## 2025-05-06 NOTE — ASSESSMENT & PLAN NOTE
"On no rate control medications  Examines RRR  Anticoagulation with Eliquis as at home but he doesn't want to take it because he is worried it will make his right eye vision worse.  I d/w on call Optho Dr Diaz = \"The risk is not quantified in the literature. Dr edmond's note suggests a diagnosis of proliferative diabetic retinopathy and bilateral and recurrent vitreous hemorrhage. So he is at risk for - and has had hemorrhage - whether or not he is on eliquis (assuming dr edmond is correct).     I would always recommend life and death are more important so to do what you recommend re his systemic condition, and then to follow up with dr edmond as he recommended so a real exam can be done in the clinic\".   Explained all this to him and he understands  He had been taking the Eliquis again and now refusing it because he is afraid of his foot bleeding.  He says \"no point in discussing it\"; I d/w him but again refuses to take it.  He is fully aware that he may have a stroke if he does not take the Eliquis  He did take 1 dose 5/3/25 and both doses 5/4/25 but none since.  "

## 2025-05-06 NOTE — ASSESSMENT & PLAN NOTE
- Continue HD  - Nephrology consulted and following, appreciate their recommendations  >4/22: new outpatient HD center needed close to his home after discharge. CM to follow up. DC pushed  New outpatient HD location is established for discharge with transportation help through sister and [employer] when needed.

## 2025-05-07 ENCOUNTER — TELEPHONE (OUTPATIENT)
Age: 65
End: 2025-05-07

## 2025-05-07 VITALS
BODY MASS INDEX: 28.23 KG/M2 | DIASTOLIC BLOOD PRESSURE: 79 MMHG | TEMPERATURE: 98.1 F | HEIGHT: 73 IN | SYSTOLIC BLOOD PRESSURE: 128 MMHG | WEIGHT: 213 LBS | HEART RATE: 67 BPM | RESPIRATION RATE: 17 BRPM | OXYGEN SATURATION: 97 %

## 2025-05-07 DIAGNOSIS — S32.029A L2 VERTEBRAL FRACTURE (HCC): ICD-10-CM

## 2025-05-07 DIAGNOSIS — S91.309A MULTIPLE OPEN WOUNDS OF FOOT: ICD-10-CM

## 2025-05-07 DIAGNOSIS — S36.892A TRAUMATIC RETROPERITONEAL HEMATOMA, INITIAL ENCOUNTER: ICD-10-CM

## 2025-05-07 PROCEDURE — 99239 HOSP IP/OBS DSCHRG MGMT >30: CPT | Performed by: NURSE PRACTITIONER

## 2025-05-07 PROCEDURE — 99232 SBSQ HOSP IP/OBS MODERATE 35: CPT | Performed by: INTERNAL MEDICINE

## 2025-05-07 PROCEDURE — 99233 SBSQ HOSP IP/OBS HIGH 50: CPT | Performed by: STUDENT IN AN ORGANIZED HEALTH CARE EDUCATION/TRAINING PROGRAM

## 2025-05-07 RX ORDER — METHOCARBAMOL 1000 MG/1
500 TABLET, FILM COATED ORAL 3 TIMES DAILY PRN
Qty: 90 TABLET | Refills: 0 | Status: SHIPPED | OUTPATIENT
Start: 2025-05-07 | End: 2025-05-07

## 2025-05-07 RX ORDER — POLYETHYLENE GLYCOL 3350 17 G/17G
17 POWDER, FOR SOLUTION ORAL DAILY PRN
Start: 2025-05-07

## 2025-05-07 RX ORDER — SENNOSIDES 8.6 MG
8.6 TABLET ORAL
Qty: 30 TABLET | Refills: 0 | Status: SHIPPED | OUTPATIENT
Start: 2025-05-07

## 2025-05-07 RX ORDER — METHOCARBAMOL 500 MG/1
500 TABLET, FILM COATED ORAL 3 TIMES DAILY PRN
Qty: 90 TABLET | Refills: 0 | Status: SHIPPED | OUTPATIENT
Start: 2025-05-07

## 2025-05-07 RX ORDER — OXYCODONE HYDROCHLORIDE 5 MG/1
5 TABLET ORAL 3 TIMES DAILY PRN
Qty: 21 TABLET | Refills: 0 | Status: SHIPPED | OUTPATIENT
Start: 2025-05-07 | End: 2025-05-17

## 2025-05-07 RX ADMIN — SENNOSIDES 17.2 MG: 8.6 TABLET, FILM COATED ORAL at 11:42

## 2025-05-07 RX ADMIN — ACETAMINOPHEN 975 MG: 325 TABLET, FILM COATED ORAL at 14:38

## 2025-05-07 RX ADMIN — METHOCARBAMOL 1000 MG: 500 TABLET ORAL at 14:38

## 2025-05-07 RX ADMIN — DOCUSATE SODIUM 100 MG: 100 CAPSULE, LIQUID FILLED ORAL at 08:11

## 2025-05-07 RX ADMIN — GABAPENTIN 100 MG: 100 CAPSULE ORAL at 08:11

## 2025-05-07 RX ADMIN — APIXABAN 5 MG: 5 TABLET, FILM COATED ORAL at 08:11

## 2025-05-07 RX ADMIN — ALLOPURINOL 100 MG: 100 TABLET ORAL at 08:11

## 2025-05-07 RX ADMIN — METHOCARBAMOL 1000 MG: 500 TABLET ORAL at 05:13

## 2025-05-07 RX ADMIN — ACETAMINOPHEN 975 MG: 325 TABLET, FILM COATED ORAL at 05:13

## 2025-05-07 NOTE — PROGRESS NOTES
Progress Note - PMR   Name: Naresh Carpio 64 y.o. male I MRN: [id number]  Unit/Bed#: -01 I Date of Admission: 4/11/2025   Date of Service: 5/7/2025 I Hospital Day: 26     Assessment & Plan  Traumatic retroperitoneal hematoma  - Sustained from mechanical fall several days prior to initial presentation on 4/2/25, presenting with progressively worsening low back pain. Trauma imaging revealed a large retroperitoneal hematoma with active extravasation and hemoglobin drop. Now s/p transfusion, Kcentra and ultimately IR embolization of the right deep circumflex iliac artery and superior gluteal artery with Dr. Garcia on 4/3/25. Unfortunately, on 4/6/25, his hemoglobin decreased and repeat CT showed a 5 mm inferior gluteal artery branch pseudoaneurysm and he is s/p IR pelvic angiogram on 4/7/25 with Dr. Hernandez, which demonstrated pseudoaneurysms at the distal branches of the left inferior gluteal artery which was embolized.   > 4/15: Hgb 7.2 Back pain seems controlled. Will be receiving increased Epogen today per IM/Nephro  > Hgb 9.7 on 5/4 labs    - Continue to trend Hgb on CBC closely  - Consider repeat imaging if Hgb continues to downtrend or pain significantly worsens  - Contact IR if there are additional bleeding concerns given multiple prior interventions described above  - Local right hip/groin access site incision care  - Analgesia, see below  - PT and OT  Acute pain due to trauma  > Evaluated by APS on acute care  - Tylenol 975 mg q8h scheduled  - Gabapentin 100 mg three times weekly  - Methocarbamol 1000 mg q8h scheduled- decreasing dose  - Lidocaine patches daily PRN  - Oxycodone 5 q4h PRN with 2.5mg Q8 for breakthrough pain, wean as possible  Impaired mobility and activities of daily living  - Rehabilitation medicine physician for daily monitoring of care, 24 hour availability for acute medical issues, medication management, and therapeutic and diagnostic assessments.  - 24 hour rehabilitation nursing 7  days per week for: management/teaching of medications, bowel/bladder routine, skin care.  - PT, OT for 2-3 hours per day, 5 to 6 days per week; 15 hours per week  - Rehabilitation Psychology as needed for adjustment and coping  - CM for barriers to discharge, community resources, and family support  - Discharge planning following to help ensure a safe and efficient discharge  - 900/7 program due to HD status, pain, expected therapy tolerance  4/18- Ongoing dispo planning with patient/sister and CM. Staff expressed that patient is progressing well from a functional standpoint but continues to have difficulties with ADLs/iADLs due to impaired vision. Will continue to work towards goals for safe dischare  4/22- DC date now pending outpatient HD setup  5/5- Outpatient HD chair time is set up. CM working on transportation and [facility]  L2 vertebral fracture (HCC)  > Sustained from a mechanical fall on 3/24/25, found to have an acute, obliquely oriented fracture of the L2 vertebral body with distraction of the dominant fracture fragment and involvement of the anterior and posterior cortices and superior endplate  > At that time, managed non-operatively with LSO bracing, though it does not appear orthopedic spine surgery or neurosurgery evaluated at the time of injury    - Maintain LSO brace with lumbar spinal precautions   - Should follow-up with spine surgery outpatient  Closed fracture of proximal end of left humerus with routine healing  - Sustained from fall in early February, per CT of the LUE on 2/9/25, there was a minimally displaced fracture of the anterior facet of the greater tuberosity of the left humerus, minimally apparent on repeat x-ray of the shoulder on 3/24/25  - He was evaluated by orthopedic surgery at the time of the initial injury and was recommended to maintain NWB through the LUE with a sling  - There is no further orthopedic evaluation documentation and it does not appear that further follow-up  occurred.  - Clinically, he is not describing ongoing significant left arm or shoulder pain and has been weight-bearing through the LUE  > 4/14: Repeat humerus X-ray done yesterday shows no change in fracture alignment. Ortho consulted.  >4/15: Ortho c/s: May WBAT to LUE. PT/OT to increase strength and ROM. F/u with Dr. Csineros in 4 weeks    - Monitor  - PT/OT  Insomnia  Patient reports he has an overall a poor sleep hygiene: he sleeps at 1-2am nightly and watches TV up until bed.  Advised patient to attempt to regulate sleep/wake cycle while here to better participate in therapy. Attempt to try to sleep earlier around midnight  >4/15: reviewed sleep log: Pt went to sleep around midnight and slept for 6 hours.  > 4/17- Poor sleep last night: was awake every other hour. Requiring nap during the day. Can increase melatonin tomorrow if this persists  Pt endorses moving from bed to chair multiple times throughout the night which is his baseline at home too    - Continue sleep log  - Continue melatonin 6mg nightly  At risk for constipation   4/15- Adjusted bowel medications: Colace 100mg BID, Senna 17.2mg with lunch and PRN Miralax/Suppos  4/17- No BM since 4/13. Lactulose this evening if no BM after lunch. Can consider Relistor if no success for opioid-inducted constipation  4/18- small hard BMs x2 yesterday after lactulose and suppository. Continue bowel regimen  >BM 4/22 but required Relistor and suppository/lactulose  > Last BM 5/3    - Will follow BMs and adjust bowel regimen to target soft, formed stools q1-2 days, per pt's regular schedule.  Anemia due to chronic kidney disease, on chronic dialysis (HCC)  > 4/14: Hgb 7.6  > 4/15: Hgb 7.2 - Discussed with IM who is coordinating with Nephrology; they will increase his Epoetin jessica  > 4/17: Hgb 7.7  > 4/29: Hgb 8.0  > Hgb 9.7 on 5/4 labs    - Trend Hgb on CBC  - Epoetin jessica with dialysis  - Transfusions per IM, appreciate their recommendations  Visual  "disturbance  - Reporting \"floater\" within the right eye with acute onset on 4/11/25 at mid-day while watching TV; describes a dark, \"spider\"-shaped floater that occupies a significant portion of the visual field, painless; has similar floaters in the left eye but smaller  - Ophthalmology consulted on [facility] admission by IM, per prior trauma surgery documentation, Dr. Chaudhry of ophthalmology was made personally aware of clinical situation on 4/11/25 prior to [facility] admission  > Ophthalmology consult 4/12: Exam c/w proliferative diabetic retinopathy with vitreous hemorrhage. Pt will follow up with ophtho immediately after discharge and for referral to a retina specialist  > 4/15- Pt thinks his R eye floater may be larger today, unable to clearly state or quantify symptoms. No other visual complaints of increased number of floaters, double/blurry vision, or bright flashes. Advised patient to inform us if symptoms change - will reach out to Ophthalmology accordingly  > 4/18- No changes noted in vision/floaters. His overall impaired vision continues to be a barrier for his ADLs/iADLs  > 4/20-24- Stable R eye floater, will need to follow-up with ophthalmology/retina specialist as an outpatient and patient is aware and endorses that his sister is already made an appointment    - Monitor for changes in vision  Diabetic ulcer of left midfoot associated with type 2 diabetes mellitus, limited to breakdown of skin (HCC)  - Has chronic wounds affecting the left foot plantar surface, left foot 1st and 2nd digits  - Left Plantar Foot Wounds and Left toes:  Cleanse with NSS and pat dry. Paint all wounds with Betadine daily and cover plantar open wound with silicone bordered foam dressing. Naren with T for Treatment and change every other day or PRN   > 4/22 : Wound care recs: Paint left foot 1st and 2nd digits with betadine dailyand covering plantar open wound with foam. Podiatry consulted   > 4/22: Podiatry did bedside debridement of " left foot plantar ulcer. Continue with local wound care, no surgical intervention needed. SALVATORE ordered and vascular surgery consulted. Pt to maintain frequent LLE elevation, strict pressure and fracture reduction. WBAT to LLE  > 4/24: Pictures of LLE reviewed on chart. Wounds are stable with no signs of infection. XR L foot 4/22 with no evidence of OM. ABIs and toe pressures are within healing range. Pt to continue to follow up with wound care for further management  > 5/6- Bleeding from L foot wound requiring dressing changes. Pt denies any trauma to the foot. He is currently refusing Eliquis due to this bleeding. Continue dressing changes PRN   PAD (peripheral artery disease) ([facility])  > Non invasive arterial studies from 3/28:  Diffuse disease noted throughout the femoral-popliteal arteries. Evidence of 50-75% stenosis in the proximal popliteal artery. Evidence of 50-75% stenosis at the proximal tibio-peroneal trunk.   LLE: Diffuse disease noted throughout the femoral-popliteal arteries. Evidence of >75% stenosis in the proximal popliteal artery.  Tech Note: Patient refused SALVATORE.  SALVATORE 4/22: Left lower limb SALVATORE noncompressible, metatarsal pressure 155 mmHg, great toe pressure 72 mmHg. Right lower limb noncompressible SALVATORE, metatarsal pressure 158 mmHg, second toe pressure 101 mmHg   Vascular surgery consult pending  Hyponatremia  > 4/14: Na 129 - A&O x3, pt mentating appropriately  > 4/15: Na 125 - Discussed with IM who is coordinating with Nephrology; they will manage Na during HD   > Sodium levels continue to fluctuate from 125-131. Pt remains A&O x3. Nephrology will continue to manage electrolyte deficiencies  > 5/5- Na 130    - Trend on BMP  - Electrolyte management per nephrology  - Continue HD in setting of ESRD  ESRD (end stage renal disease) ([facility])  - Continue HD  - Nephrology consulted and following, appreciate their recommendations  >4/22: new outpatient HD center needed close to his home after discharge. CM to  follow up. DC pushed  New outpatient HD location is established for discharge with transportation help through sister and [employer] when needed.  Paroxysmal atrial fibrillation (HCC)  - Currently rate controlled  - Embolic stroke risk reduction with apixaban 5 mg BID    >4/22- Refusing it this morning. Extensive conversation had with patient to discuss the importance of compliance given his high stroke risk.   > 4/24- now compliant with Eliquis  > 5/6- Refusing eliquis due to foot wound bleeding  Chronic diastolic (congestive) heart failure (HCC)  Wt Readings from Last 3 Encounters:   05/05/25 96.6 kg (213 lb)   04/11/25 98.5 kg (217 lb 2.5 oz)   03/24/25 107 kg (234 lb 12.6 oz)     - Most recent TTE from 10/9/24 with EF 60-65%, grade 1 diastolic dysfunction  - Volume management with HD per nephrology  - Monitor weights  - Appreciate medicine and nephrology recommendations and management  Primary hypertension  - Nifedipine  - Appreciate IM and nephrology management  Gout  - Allopurinol for flare prophylaxis  Hyperphosphatemia  > 4/15: Phos 4.6  - Management per nephrology  - Continue phosphate binders  Wound of skin  - Has right anterior pre-tibial scab, wash gently daily, leave open to air  - Wound care RN consulted and following  At high risk for skin breakdown  > 4/21- New sacral pressure injury noted by staff. Pictures reviewed on chart. Patient primarily sleeps in recliner (both here and at home). Discussed with therapy and pt will use a cushion that offers sacral relief on recliner. Will continue to monitor  >4/22- Wound care recs:  Cleanse wound with NSS, pat dry. Apply calcium alginate with silver to wound bed and cover with foam. Change every other day and prn. Cushion will not be covered per therapy for home - advised patient to find one off [employer] to continue offloading     - Moisturize skin daily with skin nourishing cream  - Ehob cushion in chair when out of bed.  - Preventative hydraguard to  "bilateral heels BID and PRN.   - Calazime paste to sacrum/buttocks BID and PRN  - Monitor clinically for breakdown, frequent turns  - Wound care RN consulted and following  At risk for venous thromboembolism (VTE)  - Apixaban, see above  Hallucinations  5/5- Over the weekend patient was having hallucinations and irregular behavior. MAR reviewed and no new medications were started. Back to baseline this morning. Symptoms likely due to hospital-acquired delirium from prolonged stay, frequent oxycodone use and overall poor sleep   5/6- No hallucinations reported. Pt slept much better last night and looks alert this morning    - Monitor for any recurrence  Hyperkalemia  5/5- K 4.1  Type 2 diabetes mellitus with foot ulcer, without long-term current use of insulin (HCC)  Lab Results   Component Value Date    HGBA1C 5.7 (H) 02/09/2025       No results for input(s): \"POCGLU\" in the last 72 hours.    Blood Sugar Average: Last 72 hrs:      Secondary hyperparathyroidism (HCC)    At risk for acid-base imbalance    Electrolyte imbalance risk      Subjective   64-year-old male with history of chronic anemia, atrial fibrillation, end-stage renal disease on hemodialysis, diabetes presenting 4/2 with worsening back pain after fall found to have a large retroperitoneal hematoma with active extravasation status post Kcentra stabilization with transfusions and multiple IR embolizations. Course complicated by acute on chronic pain     Chief Complaint: f/u ambulatory dysfunction    Interval: Patient seen and evaluated in room without any acute issues overnight.  Does remain very anxious and worried about his overall discharge probably has been several sessions discussing lab spending extra time here would not remedy most of his concerns which include seeing the retina specialist as well as dealing with all of the transportation and needs outside of the hospital.  His sister was on the phone with case management as well as the patient " discussing the plan and she is picking him up later today at 4 PM I will assist with some needs specially in the initial phases of his discharge.  Went over his medications which he had requested with changing pharmacies and some of his medications are at the [facility] in [place] and some and the [facility] at [place].  Went over safe opioid weaning and medication management.  Reviewed discharge instructions as well as follow-ups and orders and medication reconciliation completed.  Patient will need to follow-up with wound care for the foot and the ophthalmologist/retina specialist soon as he leaves the hospital.    Objective :  Temp:  [97.4 °F (36.3 °C)-97.9 °F (36.6 °C)] 97.8 °F (36.6 °C)  HR:  [60-70] 66  BP: (146-167)/(65-92) 146/65  Resp:  [16-19] 18  SpO2:  [97 %-98 %] 98 %  O2 Device: None (Room air)    Functional Update:  Physical Therapy Occupational Therapy Speech Therapy   Weight Bearing Status: Full Weight Bearing  Transfers: Independent  Bed Mobility: Independent (sleeps in recliner)  Amulation Distance (ft): 350 feet  Ambulation: Supervision, Independent (DS-Judi)  Assistive Device for Ambulation: Roller Walker  Number of Stairs: 8  Assistive Device for Stairs: Lehft Hand Rail  Stair Assistance: Supervision  Discharge Recommendations: Home with:  DC Home with:: Family Support, Home Physical Therapy   Eating: Independent  Grooming: Independent  Bathing:  (setup)  Bathing:  (setup)  Upper Body Dressing: Independent  Lower Body Dressing: Supervision  Toileting: Independent  Tub/Shower Transfer: Independent  Toilet Transfer: Independent  Cognition: Exceptions to WNL  Cognition: Decreased Memory, Decreased Executive Functions, Decreased Attention, Decreased Safety, Behavioral Considerations  Orientation: Place, Person, Time, Situation                 Physical Exam  Vitals reviewed.   Constitutional:       General: He is not in acute distress.  HENT:      Head: Normocephalic and atraumatic.      Right  Ear: External ear normal.      Left Ear: External ear normal.      Nose: Nose normal. No rhinorrhea.      Mouth/Throat:      Mouth: Mucous membranes are moist.      Pharynx: Oropharynx is clear.   Eyes:      General: No scleral icterus.  Cardiovascular:      Rate and Rhythm: Normal rate.      Pulses: Normal pulses.   Pulmonary:      Effort: Pulmonary effort is normal. No respiratory distress.   Abdominal:      General: There is no distension.      Palpations: Abdomen is soft.   Musculoskeletal:      Right lower leg: No edema.      Left lower leg: No edema.      Comments: Generally kyphotic posture   Skin:     General: Skin is warm and dry.   Neurological:      Mental Status: He is alert and oriented to person, place, and time.      Sensory: Sensory deficit present.   Psychiatric:         Mood and Affect: Mood normal.         Behavior: Behavior normal.             Scheduled Meds:  Current Facility-Administered Medications   Medication Dose Route Frequency Provider Last Rate    acetaminophen  975 mg Oral Q8H [facility] Damian Cerda MD      allopurinol  100 mg Oral Daily Damian Cerda MD      apixaban  5 mg Oral BID Damian Cerda MD      atorvastatin  80 mg Oral Daily With Dinner Damian Cerda MD      bisacodyl  10 mg Rectal Daily PRN Michelle T Landa, DO      calcium carbonate  500 mg Oral Daily PRN GRANT Church      docusate sodium  100 mg Oral BID Michelle T Landa, DO      epoetin jessica  8,000 Units Intravenous After Dialysis German Linton MD      gabapentin  100 mg Oral Once per day on Monday Wednesday Friday Damian Cerda MD      lidocaine  1 patch Topical Daily PRN Damian Cerda MD      melatonin  6 mg Oral HS Jayden Rowland MD      methocarbamol  1,000 mg Oral Q8H [facility] Jayden Rowland MD      naloxone  0.04 mg Intravenous Q1MIN PRN Damian Cerda MD      NIFEdipine  30 mg Oral After Dinner Damian Cerda MD      ondansetron  4 mg Oral Q6H PRN Gino Austin PA-C      oxyCODONE  5 mg Oral Q4H PRN  Damian Cerda MD      Or    oxyCODONE  10 mg Oral Q4H PRN Damian Cerda MD      oxyCODONE  2.5 mg Oral Q8H PRN Jayden Rowland MD      polyethylene glycol  17 g Oral Daily PRN Michelle Landa,       senna  2 tablet Oral Daily With Lunch Michelle Landa DO      sevelamer  1,600 mg Oral TID With Meals Damian Cerda MD      white petrolatum-mineral oil   Topical TID PRN Michelle Landa DO           Lab Results: I have reviewed the following results:  Results from last 7 days   Lab Units 05/04/25  0945   HEMOGLOBIN g/dL 9.7*   HEMATOCRIT % 32.2*   WBC Thousand/uL 5.38   PLATELETS Thousands/uL 214     Results from last 7 days   Lab Units 05/05/25  0532 05/04/25  0945   BUN mg/dL 31* 24   SODIUM mmol/L 130* 129*   POTASSIUM mmol/L 4.1 4.2   CHLORIDE mmol/L 95* 95*   CREATININE mg/dL 6.09* 4.70*   AST U/L  --  14   ALT U/L  --  7                Santos Guan DO  Physical Medicine and Rehabilitation  [facility]    I have spent a total time of 55 minutes in caring for this patient on the day of the visit/encounter including Risks and benefits of tx options, Instructions for management, Importance of tx compliance, Risk factor reductions, Counseling / Coordination of care, Documenting in the medical record, Reviewing/placing orders in the medical record (including tests, medications, and/or procedures), Obtaining or reviewing history  , and Communicating with other healthcare professionals .

## 2025-05-07 NOTE — DISCHARGE SUMMARY
"Discharge Summary - Hospitalist   Name: Naresh Carpio 64 y.o. male I MRN: [id number]  Unit/Bed#: -01 I Date of Admission: 4/11/2025   Date of Service: 5/7/2025 I Hospital Day: 26     Assessment & Plan  Traumatic retroperitoneal hematoma  Admitted to the trauma service after a fall with L2 vertebral body and retroperitoneal hematoma.  S/p IR embolization of distal right deep circumflex iliac artery/distal right superior gluteal artery both of which supplied an area of active extravasation and then pseudoaneurysms present at distal branches of the left inferior gluteal artery. The artery was embolized using coils and gelfoam.   Hgb on 5/4/25 was 9.7 and stable  Anemia due to chronic kidney disease, on chronic dialysis ([facility])  Had 5 U PRBCs during hospital stay  On  4/15/24 was 7.2 ---> Renal increased his Epogen  CBC 4/29/25 = 8.0/stable  Hgb improved and on 5/4/25 was up to 9.7  ESRD (end stage renal disease) ([facility])  Continue Sevelamer TID with meals  Renal following  HD T-TH-Sat while here in rehab  Paroxysmal atrial fibrillation ([facility])  On no rate control medications  Examines RRR  Anticoagulation with Eliquis as at home but he doesn't want to take it because he is worried it will make his right eye vision worse.  I d/w on call Optho Dr Diaz = \"The risk is not quantified in the literature. Dr edmond's note suggests a diagnosis of proliferative diabetic retinopathy and bilateral and recurrent vitreous hemorrhage. So he is at risk for - and has had hemorrhage - whether or not he is on eliquis (assuming dr edmond is correct).     I would always recommend life and death are more important so to do what you recommend re his systemic condition, and then to follow up with dr edmond as he recommended so a real exam can be done in the clinic\".   Explained all this to him and he understands  He had been taking the Eliquis again and now refusing it because he is afraid of his foot bleeding.  He says \"no point in " "discussing it\"; I d/w him but again refuses to take it.  He is fully aware that he may have a stroke if he does not take the Eliquis  He did take 1 dose 5/3/25 and both doses 5/4/25 but none since despite being counseled multiple times.  Chronic diastolic (congestive) heart failure (HCC)  Bilateral LE edema = chronic/stable  Renal diet  Volume management via HD  Primary hypertension  Continue Procardia XL 30 mg qd  Tx per renal  Stable; no changes today 5/7/25 for home  Visual disturbance  Reported a right eye \"floater\" without associated pain earlier in his hospitalization which has resolved but continued to have blurry central vision.  Ophthalmology saw here in [facility] = visual acuity without correction is 20/400 OU. Dx: Proliferative diabetic retinopathy with vitreous hemorrhage OU as well as cataracts.   Will need outpatient follow up immediately after discharge from rehab  Diabetic ulcer of left midfoot associated with type 2 diabetes mellitus, limited to breakdown of skin (HCC)  Continue local care  Being followed by WC  Last LEADS was 3/28/25 = 50-75% right prox polital and prox tibioperoneal trunk; >75% stenosis in prox popliteal artery on left  Was seen by VS in 12/2024 hospital stay and he declined any intervention at the time  Podiatry saw here on 4/22/25, s/p debridement = no surgical intervention needed at this time  Had new bleeding from foot wound 4/27/25. The bleeding then resolved. Podiatry was made aware.  Not sure wound is actually from DM but more likely PAD.  He has normal BSs here and is not on any DM meds.  +pre-DM PTA  Hyponatremia  Per Nephrology  Seems to chronically run 129-130  Continue fluid restriction 1200 cc per 24 hours  Closed fracture of proximal end of left humerus with routine healing  Occurred from a fall in February and eval'd at the time of the injury  Was to continue NWB/sling and followup with Ortho but there was no followup done  Xray 4/13/25 = \"Unchanged alignment of the " "chronic greater tuberosity avulsion fracture\".   Ortho saw here on 4/14 = WBAT; see Dr Cisneros 4 weeks @ 5/14/25  Gout  Continue Allopurinol  L2 vertebral fracture ([facility])  CT CAP 4/2: Fracture of L2 vertebral body extending to the superior endplate  Continue LSO brace  Insomnia  Patient reports not sleeping well at night because he has trouble getting comfortable. He has been trying to sleep in the recliner   PAD (peripheral artery disease) ([facility])  Vascular surgery saw per Podiatry's recommendation = they will see in the office.  They advised start ASA but after the d/w the PA in relation to his vitreous hemorrhages, can hold off on that and d/w him in the office.  This will give him time to see the Retina specialist as OP as about getting tx  Type 2 diabetes mellitus with foot ulcer, without long-term current use of insulin ([facility])  HgbA1C was 5.7 on 2/9/25  Not on medications at here/home   FBSs have been normal  On Regular diet, appetite fair.  Hallucinations  Nursing reported he was confused/hallucinating late night Saturday into Sunday.  He was seeing people in his room that weren't there.   STAT head CT 5/3/25: no acute findings  Nursing reported he was having pain in dialysis on 5/3/25 so he was given OxyIR at 0930  and then again at 1030 because it hadn't improved with the first dose.  This may have contributed to his increased confusion  Has been afebrile and WBC normal  Back to baseline currently     Discharge Summary   Naresh Carpio 64 y.o. male MRN: [id number]  Unit/Bed#: [facility] 964-01 Encounter: [id number]  Admission Date: 4/11/2025     Discharge Date: 5/7/25    Discharging Provider: Rosa Schulte    PCP:  [phone]      HPI: Refer to previous hospitalization for complete record    Summary of [facility] Course:     During the course of the patient's stay in [facility], he remained stable on Procardia 30 mg daily.  Nephrology followed for hemodialysis.  He was seen by Opthalmology for visual " "disturbance/floater.  He was found to have diabetic retinopathy with vitreous hemorrhage OU and cataracts.  He will need to be seen again in their office immediately after he gets home. He refused to take his Eliquis noting fear that his vision would worsen and then for sporadic bleeding of his foot wound.   Despite several conversations about the risks of not taking the Eliquis, he would not take it.  Podiatry followed while here for his foot wounds.  He will followup with them as an outpatient.    He was discharged in stable condition to home on 5/7/25.    Discharge Physical Examination:  /65 (BP Location: Left arm)   Pulse 66   Temp 97.8 °F (36.6 °C) (Oral)   Resp 18   Ht 6' 1\" (1.854 m)   Wt 96.6 kg (213 lb)   SpO2 98%   BMI 28.10 kg/m²     General Appearance: no distress, non toxic appearing  HEENT: PERRLA, conjuctiva normal; oropharynx clear; mucous membranes moist   Neck:  Supple, normal ROM  Lungs: CTA, normal respiratory effort, no retractions, expiratory effort normal  CV: regular rate and rhythm; no rubs/murmurs/gallops, PMI normal   ABD: soft; ND/NT; +BS  EXT: no edema  Skin: normal turgor, normal texture  Psych: affect normal, mood normal  Neuro: AAO        Significant Findings, Care, Treatment and Services Provided: Acute comprehensive interdisciplinary inpatient rehabilitation including PT, OT, SLP, RN, CM, SW, dietary, psychology, etc.      Physiatry Additions/Comorbidities:    Traumatic retroperitoneal hematoma  - Sustained from mechanical fall several days prior to initial presentation on 4/2/25, presenting with progressively worsening low back pain. Trauma imaging revealed a large retroperitoneal hematoma with active extravasation and hemoglobin drop. Now s/p transfusion, Kcentra and ultimately IR embolization of the right deep circumflex iliac artery and superior gluteal artery with Dr. Garcia on 4/3/25. Unfortunately, on 4/6/25, his hemoglobin decreased and repeat CT showed a 5 mm " inferior gluteal artery branch pseudoaneurysm and he is s/p IR pelvic angiogram on 4/7/25 with Dr. Hernandez, which demonstrated pseudoaneurysms at the distal branches of the left inferior gluteal artery which was embolized.   > 4/15: Hgb 7.2 Back pain seems controlled. Will be receiving increased Epogen today per IM/Nephro  > Hgb 9.7 on 5/4 labs prior to discharge    - Contact IR if there are additional bleeding concerns given multiple prior interventions described above    Acute pain due to trauma  > Evaluated by APS on acute care  Pain was relatively controlled while at [facility] on Tylenol 975 mg q8h scheduled, Gabapentin 100 mg three times weekly, Methocarbamol 1000 mg q8h scheduled, Lidocaine patches daily PRN and Oxycodone 5 q4h PRN with 2.5mg Q8 for breakthrough pain    Educated patient on safe opioid management and weaning for after discharge.    Impaired mobility and activities of daily living  - Rehabilitation medicine physician for daily monitoring of care, 24 hour availability for acute medical issues, medication management, and therapeutic and diagnostic assessments.  - 24 hour rehabilitation nursing 7 days per week for: management/teaching of medications, bowel/bladder routine, skin care.  - PT, OT for 2-3 hours per day, 5 to 6 days per week; 15 hours per week  - Rehabilitation Psychology as needed for adjustment and coping  - CM for barriers to discharge, community resources, and family support  - Discharge planning following to help ensure a safe and efficient discharge  - 900/7 program due to HD status, pain, expected therapy tolerance    Patient progressed well during acute inpatient rehabilitation course. He is now at an independent level for transfers, Judi-Sup level for ambulation and mostly independent for ADLs. He will continue with  PT/OT upon discharge.    L2 vertebral fracture (HCC)  > Sustained from a mechanical fall on 3/24/25, found to have an acute, obliquely oriented fracture of the L2  vertebral body with distraction of the dominant fracture fragment and involvement of the anterior and posterior cortices and superior endplate  > At that time, managed non-operatively with LSO bracing, though it does not appear orthopedic spine surgery or neurosurgery evaluated at the time of injury     - Maintain LSO brace with lumbar spinal precautions   - Should follow-up with spine surgery outpatient    Closed fracture of proximal end of left humerus with routine healing  - Sustained from fall in early February, per CT of the LUE on 2/9/25, there was a minimally displaced fracture of the anterior facet of the greater tuberosity of the left humerus, minimally apparent on repeat x-ray of the shoulder on 3/24/25  - He was evaluated by orthopedic surgery at the time of the initial injury and was recommended to maintain NWB through the LUE with a sling  - There is no further orthopedic evaluation documentation and it does not appear that further follow-up occurred.  - Clinically, he is not describing ongoing significant left arm or shoulder pain and has been weight-bearing through the LUE  > 4/14: Repeat humerus X-ray done yesterday shows no change in fracture alignment. Ortho consulted.  >4/15: Ortho c/s: May WBAT to LUE. PT/OT to increase strength and ROM. F/u with Dr. Cisneros in 4 weeks     - Follow up with orthopedics on discharge    Insomnia  Patient reports he has an overall a poor sleep hygiene: he sleeps at 1-2am nightly and watches TV up until bed.  Advised patient to attempt to regulate sleep/wake cycle while here to better participate in therapy. Attempt to try to sleep earlier around midnight  >4/15: reviewed sleep log: Pt went to sleep around midnight and slept for 6 hours.  > 4/17- Poor sleep last night: was awake every other hour. Requiring nap during the day. Can increase melatonin tomorrow if this persists  Pt endorses moving from bed to chair multiple times throughout the night which is his  "baseline at home too     Encouraged good sleep hygiene habits to continue at home for insomnia. Can take melatonin over the counter as needed    At risk for constipation   4/15- Adjusted bowel medications: Colace 100mg BID, Senna 17.2mg with lunch and PRN Miralax/Suppos  4/17- No BM since 4/13. Lactulose this evening if no BM after lunch. Can consider Relistor if no success for opioid-inducted constipation  4/18- small hard BMs x2 yesterday after lactulose and suppository. Continue bowel regimen  >BM 4/22 but required Relistor and suppository/lactulose  > Last BM 5/4     Continue stool softeners as needed to maintain regular BM's every 1-2 days while on opioids for pain management.    Visual disturbance  - Reporting \"floater\" within the right eye with acute onset on 4/11/25 at mid-day while watching TV; describes a dark, \"spider\"-shaped floater that occupies a significant portion of the visual field, painless; has similar floaters in the left eye but smaller  - Ophthalmology consulted on [facility] admission by IM, per prior trauma surgery documentation, Dr. Chaudhry of ophthalmology was made personally aware of clinical situation on 4/11/25 prior to [facility] admission  > Ophthalmology consult 4/12: Exam c/w proliferative diabetic retinopathy with vitreous hemorrhage. Pt will follow up with ophtho immediately after discharge and for referral to a retina specialist  > 4/15- Pt thinks his R eye floater may be larger today, unable to clearly state or quantify symptoms. No other visual complaints of increased number of floaters, double/blurry vision, or bright flashes. Advised patient to inform us if symptoms change - will reach out to Ophthalmology accordingly  > 4/18- No changes noted in vision/floaters. His overall impaired vision continues to be a barrier for his ADLs/iADLs  > 4/20-24- Stable R eye floater, will need to follow-up with ophthalmology/retina specialist as an outpatient and patient is aware and endorses that his " sister is already made an appointment     - No changes in vision upon discharge    Diabetic ulcer of left midfoot associated with type 2 diabetes mellitus, limited to breakdown of skin (HCC)  - Has chronic wounds affecting the left foot plantar surface, left foot 1st and 2nd digits  - Left Plantar Foot Wounds and Left toes:  Cleanse with NSS and pat dry. Paint all wounds with Betadine daily and cover plantar open wound with silicone bordered foam dressing. Naren with T for Treatment and change every other day or PRN   > 4/22 : Wound care recs: Paint left foot 1st and 2nd digits with betadine dailyand covering plantar open wound with foam. Podiatry consulted   > 4/22: Podiatry did bedside debridement of left foot plantar ulcer. Continue with local wound care, no surgical intervention needed. SALVATORE ordered and vascular surgery consulted. Pt to maintain frequent LLE elevation, strict pressure and fracture reduction. WBAT to LLE  > 4/24: Pictures of LLE reviewed on chart. Wounds are stable with no signs of infection. XR L foot 4/22 with no evidence of OM. ABIs and toe pressures are within healing range. Pt to continue to follow up with wound care for further management  > 5/6- Bleeding from L foot wound requiring dressing changes. Pt denies any trauma to the foot. He is currently refusing Eliquis due to this bleeding. Continue dressing changes PRN     Will continue to follow with [facility] RN for wound care    Wound of skin  - Has right anterior pre-tibial scab, wash gently daily, leave open to air    Will continue to follow with [facility] RN for wound care    At high risk for skin breakdown  > 4/21- New sacral pressure injury noted by staff. Pictures reviewed on chart. Patient primarily sleeps in recliner (both here and at home). Discussed with therapy and pt will use a cushion that offers sacral relief on recliner. Will continue to monitor  >4/22- Wound care recs:  Cleanse wound with  NSS, pat dry. Apply calcium alginate with silver to wound bed and cover with foam. Change every other day and prn. Cushion will not be covered per therapy for home - advised patient to find one off [employer] to continue offloading      - Moisturize skin daily with skin nourishing cream  - Ehob cushion in chair when out of bed.  - Preventative hydraguard to bilateral heels BID and PRN.   - Calazime paste to sacrum/buttocks BID and PRN  - Monitor clinically for breakdown, frequent turns    Will continue to follow with [facility] and  RN for wound care    Hallucinations  5/5- Over the weekend patient was having hallucinations and irregular behavior. MAR reviewed and no new medications were started. Back to baseline this morning. Symptoms likely due to hospital-acquired delirium from prolonged stay, frequent oxycodone use and overall poor sleep   5/6- No hallucinations reported. Pt slept much better last night and looks alert this morning     - No further hallucinations noted upon discharge. Encourage good sleep hygiene and limit opioids when possible.    Acute Rehabilitation Center Course: Patient participated in a comprehensive interdisciplinary inpatient rehabilitation program which included involvment of Rehab MD, therapies (PT, OT, and/or SLP), RN, CM, SW, dietary, and psychology services.     Etiologic/Rehabilitation Diagnosis: Impairment of mobility, safety and Activities of Daily Living (ADLs) due to Other Disabling Impairments:  13  Other Disabling Impairments    Functional Status Upon Admission to [facility]:  Mobility: modA  Transfers: supervision to modA  ADLS: setup eating, supervision oral care/toileting/UBD, modA bathing/LBD/footwear    Functional Status Upon Discharge from [facility]:   Mobility: Independent bed mobility and supervision-Judi with RW for ambulation (350')  Transfers: independent  ADLs: Independent eating/grooming/UBD/toileting, setup bathing, Supervision LBD    Condition at Discharge: stable      Discharge instructions/Information to patient and family:   See after visit summary for information provided to patient and family.      Provisions for Follow-Up Care:  See after visit summary for information related to follow-up care and any pertinent home health orders.      Future Appointments   Date Time Provider Department Center   5/8/2025  1:00 PM BE DIALYSIS B[facility] 1 [facility] Dialysis [facility]   5/16/2025  2:30 PM DO KIMBER [facility]           Disposition: Home    Planned Readmission: No    Discharge Statement   I spent 60 minutes or more discharging the patient.  I had direct contact with the patient on the day of discharge. Greater than 50% of the total time was spent examining patient, answering all patient questions, arranging and discussing plan of care with patient and care team as well as directly providing post-discharge instructions. Additional time then spent on discharge activities.    Discharge Medications:  See after visit summary for reconciled discharge medications provided to patient and family.

## 2025-05-07 NOTE — ASSESSMENT & PLAN NOTE
"- Reporting \"floater\" within the right eye with acute onset on 4/11/25 at mid-day while watching TV; describes a dark, \"spider\"-shaped floater that occupies a significant portion of the visual field, painless; has similar floaters in the left eye but smaller  - Ophthalmology consulted on [facility] admission by IM, per prior trauma surgery documentation, Dr. Chaudhry of ophthalmology was made personally aware of clinical situation on 4/11/25 prior to [facility] admission  > Ophthalmology consult 4/12: Exam c/w proliferative diabetic retinopathy with vitreous hemorrhage. Pt will follow up with ophtho immediately after discharge and for referral to a retina specialist  > 4/15- Pt thinks his R eye floater may be larger today, unable to clearly state or quantify symptoms. No other visual complaints of increased number of floaters, double/blurry vision, or bright flashes. Advised patient to inform us if symptoms change - will reach out to Ophthalmology accordingly  > 4/18- No changes noted in vision/floaters. His overall impaired vision continues to be a barrier for his ADLs/iADLs  > 4/20-24- Stable R eye floater, will need to follow-up with ophthalmology/retina specialist as an outpatient and patient is aware and endorses that his sister is already made an appointment    - Monitor for changes in vision  "

## 2025-05-07 NOTE — PHYSICAL THERAPY NOTE
DC Summary    Pt arrived to [facility] with dec B LE strength, dec balance and righting reactions and overall limited activity tolerance/endurance. Due to his impairments he had limitations in bed mobility, transfers, ambulation and stair negotiation. Pt barriers to DC home included dec caregiver support and dec home accessibility- pt lives alone and has 8 NAZARIO. Additionally, pt was limited by L2 fx and pain along with dec vision in B eyes R>L. Pt comorbidities include ESRD and continued dialysis that up until DC was a barrier to DC home as pt could not drive himself- CM worked with various companies to figure out transportation. No formal family training was completed due to pt returning home mod I with RW, but a family meeting was held during patients stay and his CLOF: ambulation and stair negotiation were demonstrated to pt sister and her significant other and they verbalized he was doing much better. HEP was provided throughout stay and adaptive DME was recommended: walker tray, shoe horn, and reacher. Interventions to address pt impairments/limitations and barriers included repetitive functional task practice, endurance training with ambulation and RW to address dec balance and LE TE and flexibility training to address pain management. Pt has reached his goals of mod I with RW for household ambulation distances, transfers, and stair negotiation. Current recommendations include home PT to continue working on improving LE strength, balance and endurance to dec fall risk, improve QoL and improve overall functional mobility.    he is mod I with RW for functional mobility and with help from sister.

## 2025-05-07 NOTE — QUICK NOTE
"Pts alarm was going off so this writer walked into the pts room - he was adjusting his chair and questioned why the alarm was on as he has IRP. This writer turned off alarm. Pt then reached out and said \"take my hand. Please.\" And began making the audible sounds that he was crying although no tears were observed. He stated \"I can't go home. You guys can't do this to me. I must have rights - what are my rights? What are my rights? I must have some legal right to say I can't go home.\" This writer provided emotional support and reminded the pt of all the progress he has made in therapy. Pt just cont to repeat \"what are my rights?\". This writer stated he can appeal the discharge with medicare. He proceeded to ask this writer to assist him with calling his sister, stating \"something needs to be done\". This writer dialed his sisters number for him on his phone, left all his needs in reach and left his room. This writer informed his primary nurse, Meaghan, of what occurred and a message was sent to the team.  "

## 2025-05-07 NOTE — NURSING NOTE
Pt being discharged home with sister today after s/p fall with L2 vertebral fx & retroperitoneal hemorrhage. LSO brace when oob, IRP RW . Continent B&B, pain controlled with current regimen.sacral wound resolving & sits on a speciality cushion, off loading frequently. BLE foot wounds with daily dsg changes next due 5/8/25.  Continues hemodialysis treatments as outpt three times a week.   all other ROS negative except as per HPI

## 2025-05-07 NOTE — TELEPHONE ENCOUNTER
Inbound call received from Pharmacist Felicita in regards to Dr. uGan's script below:      methocarbamol 1000 MG TABS [[id number]]    Order Details  Dose: 500 mg Route: Oral Frequency: 3 times daily PRN for muscle spasms   Dispense Quantity: 90 tablet Refills: 0          Sig: Take 500 mg by mouth 3 (three) times a day as needed for muscle spasms     Pharmacist states the 1000 mg tablets are very expensive and they have 500 mg tablets on hand. Pharmacist would like to ask for the script to be changed to 500 mg tablets since the dose is 500 mg.     Pharmacist made aware this order was placed as inpatient and Patient is not yet seen by the out Patient neurology. CTS will send message to inpatient provider to consider sending in new script.    Dr. Guna please change script to 500 mg tablets if agreeable, thank you!

## 2025-05-07 NOTE — PROGRESS NOTES
Progress Note - Nephrology   Name: Naresh Carpio 64 y.o. male I MRN: [id number]  Unit/Bed#: -01 I Date of Admission: 4/11/2025   Date of Service: 5/7/2025 I Hospital Day: 26     Assessment & Plan  ESRD (end stage renal disease) (HCC)    The patient has end-stage renal disease hemodialysis is Tuesday Thursday Saturday.  He tolerated his procedure yesterday he said he was able to stay on the whole treatment.  Reviewing the dialysis nurses note he had 1.1 kg ultrafiltered and a 3.5-hour treatment.    Hemodialysis Tuesday Thursday Saturday  Reduce dry weight as tolerated    Anemia due to chronic kidney disease, on chronic dialysis ([facility])    Last hemoglobin was 9.7.  Epogen on dialysis.  Long-term goal 10-12.    Secondary hyperparathyroidism (HCC)    Secondary hyperparathyroidism due to end-stage renal disease.  Managed with phosphate binders and vitamin D.  Follows chronically as an outpatient in his clinic.    The patient is status post retroperitoneal bleed requiring IR embolization.  Still very debilitated and receiving therapy.    Subjective     The patient is awake and alert he looks weak and debilitated he is very concerned because he does not feel he is ready to go home.  He is worried that he will end up back in the hospital because he cannot really get around.  He did tolerate his 4-hour treatment dialysis yesterday.    No chest pain shortness of breath positive dyspnea on exertion  No nausea vomiting diarrhea abdominal pain  No cough or wheezing  Positive fatigue no fever chills    Objective :  Temp:  [97.4 °F (36.3 °C)-97.9 °F (36.6 °C)] 97.8 °F (36.6 °C)  HR:  [60-70] 66  BP: (146-167)/(65-92) 146/65  Resp:  [16-19] 18  SpO2:  [97 %-98 %] 98 %  O2 Device: None (Room air)    Current Weight: Weight - Scale: 96.6 kg (213 lb)  First Weight: Weight - Scale: 98.9 kg (218 lb)  I/O         05/05 0701  05/06 0700 05/06 0701  05/07 0700 05/07 0701  05/08 0700    P.O. 570 380     I.V. (mL/kg)  500 (5.2)      Total Intake(mL/kg) 570 (5.9) 880 (9.1)     Urine (mL/kg/hr) 150 (0.1)      Other  1603     Stool  0     Total Output 150 1603     Net +420 -723            Unmeasured Urine Occurrence 1 x      Unmeasured Stool Occurrence  1 x           Physical Exam  Constitutional:       General: He is not in acute distress.     Appearance: He is not toxic-appearing.   HENT:      Head: Normocephalic and atraumatic.      Mouth/Throat:      Mouth: Mucous membranes are dry.   Eyes:      Extraocular Movements: Extraocular movements intact.   Cardiovascular:      Rate and Rhythm: Normal rate and regular rhythm.      Heart sounds:      No gallop.      Comments: Positive edema left greater than right.  Pulmonary:      Effort: Pulmonary effort is normal. No respiratory distress.      Breath sounds: No wheezing or rales.   Abdominal:      General: Bowel sounds are normal. There is no distension.      Palpations: Abdomen is soft.      Tenderness: There is no abdominal tenderness.   Neurological:      Mental Status: He is alert and oriented to person, place, and time.         Medications:    Current Facility-Administered Medications:     acetaminophen (TYLENOL) tablet 975 mg, 975 mg, Oral, Q8H KEMAL, Damian Cerda MD, 975 mg at 05/07/25 0513    allopurinol (ZYLOPRIM) tablet 100 mg, 100 mg, Oral, Daily, Damian Cerda MD, 100 mg at 05/07/25 0811    apixaban (ELIQUIS) tablet 5 mg, 5 mg, Oral, BID, Damian Cerda MD, 5 mg at 05/07/25 0811    atorvastatin (LIPITOR) tablet 80 mg, 80 mg, Oral, Daily With Dinner, Damian Cerda MD, 80 mg at 05/06/25 1735    bisacodyl (DULCOLAX) rectal suppository 10 mg, 10 mg, Rectal, Daily PRN, Michelle Landa, DO, 10 mg at 04/18/25 0000    calcium carbonate (TUMS) chewable tablet 500 mg, 500 mg, Oral, Daily PRN, GRANT Church, 500 mg at 05/06/25 1507    docusate sodium (COLACE) capsule 100 mg, 100 mg, Oral, BID, Michelleaura Ngoel, DO, 100 mg at 05/07/25 0811    epoetin jessica (EPOGEN,PROCRIT) injection 8,000 Units,  8,000 Units, Intravenous, After Dialysis, German Linton MD, 8,000 Units at 05/06/25 1712    gabapentin (NEURONTIN) capsule 100 mg, 100 mg, Oral, Once per day on Monday Wednesday Friday, Damian Cerda MD, 100 mg at 05/07/25 0811    lidocaine (LIDODERM) 5 % patch 1 patch, 1 patch, Topical, Daily PRN, Damian Cerda MD, 1 patch at 04/18/25 2135    melatonin tablet 6 mg, 6 mg, Oral, HS, Jayden Rowland MD, 6 mg at 05/06/25 2113    methocarbamol (ROBAXIN) tablet 1,000 mg, 1,000 mg, Oral, Q8H KEMAL, Jayden Rowland MD, 1,000 mg at 05/07/25 0513    naloxone (NARCAN) 0.04 mg/mL syringe 0.04 mg, 0.04 mg, Intravenous, Q1MIN PRN, Damian Cerda MD    NIFEdipine (PROCARDIA XL) 24 hr tablet 30 mg, 30 mg, Oral, After Dinner, Damian Cerda MD, 30 mg at 05/06/25 1735    ondansetron (ZOFRAN-ODT) dispersible tablet 4 mg, 4 mg, Oral, Q6H PRN, Gino Austin PA-C, 4 mg at 04/20/25 0023    oxyCODONE (ROXICODONE) IR tablet 5 mg, 5 mg, Oral, Q4H PRN, 5 mg at 05/04/25 2122 **OR** oxyCODONE (ROXICODONE) immediate release tablet 10 mg, 10 mg, Oral, Q4H PRN, Damian Cerda MD, 10 mg at 05/06/25 0600    oxyCODONE (ROXICODONE) split tablet 2.5 mg, 2.5 mg, Oral, Q8H PRN, Jayden Rowland MD, 2.5 mg at 05/03/25 1026    polyethylene glycol (MIRALAX) packet 17 g, 17 g, Oral, Daily PRN, Michelle Landa DO, 17 g at 04/26/25 1259    senna (SENOKOT) tablet 17.2 mg, 2 tablet, Oral, Daily With Lunch, Mcihelle Landa DO, 17.2 mg at 05/06/25 1123    sevelamer (RENAGEL) tablet 1,600 mg, 1,600 mg, Oral, TID With Meals, Damian Cerda MD, 1,600 mg at 05/06/25 1735    white petrolatum-mineral oil (EUCERIN,HYDROCERIN) cream, , Topical, TID PRN, Michelle Landa DO      Lab Results: I have reviewed the following results:  Results from last 7 days   Lab Units 05/05/25  0532 05/04/25  0945   WBC Thousand/uL  --  5.38   HEMOGLOBIN g/dL  --  9.7*   HEMATOCRIT %  --  32.2*   PLATELETS Thousands/uL  --  214   POTASSIUM mmol/L 4.1 4.2   CHLORIDE mmol/L 95* 95*  "  CO2 mmol/L 24 22   BUN mg/dL 31* 24   CREATININE mg/dL 6.09* 4.70*   CALCIUM mg/dL 9.0 9.3   ALBUMIN g/dL  --  4.2       Administrative Statements     Portions of the record may have been created with voice recognition software. Occasional wrong word or \"sound a like\" substitutions may have occurred due to the inherent limitations of voice recognition software. Read the chart carefully and recognize, using context, where substitutions have occurred.If you have any questions, please contact the dictating provider.  "

## 2025-05-07 NOTE — ASSESSMENT & PLAN NOTE
Admitted to the trauma service after a fall with L2 vertebral body and retroperitoneal hematoma.  S/p IR embolization of distal right deep circumflex iliac artery/distal right superior gluteal artery both of which supplied an area of active extravasation and then pseudoaneurysms present at distal branches of the left inferior gluteal artery. The artery was embolized using coils and gelfoam.   Hgb on 5/4/25 was 9.7 and stable

## 2025-05-07 NOTE — ASSESSMENT & PLAN NOTE
HgbA1C was 5.7 on 2/9/25  Not on medications at here/home   FBSs have been normal  On Regular diet, appetite fair.

## 2025-05-07 NOTE — ASSESSMENT & PLAN NOTE
"On no rate control medications  Examines RRR  Anticoagulation with Eliquis as at home but he doesn't want to take it because he is worried it will make his right eye vision worse.  I d/w on call Optho Dr Diaz = \"The risk is not quantified in the literature. Dr edmond's note suggests a diagnosis of proliferative diabetic retinopathy and bilateral and recurrent vitreous hemorrhage. So he is at risk for - and has had hemorrhage - whether or not he is on eliquis (assuming dr edmond is correct).     I would always recommend life and death are more important so to do what you recommend re his systemic condition, and then to follow up with dr edmond as he recommended so a real exam can be done in the clinic\".   Explained all this to him and he understands  He had been taking the Eliquis again and now refusing it because he is afraid of his foot bleeding.  He says \"no point in discussing it\"; I d/w him but again refuses to take it.  He is fully aware that he may have a stroke if he does not take the Eliquis  He did take 1 dose 5/3/25 and both doses 5/4/25 but none since despite being counseled multiple times.  "

## 2025-05-07 NOTE — ASSESSMENT & PLAN NOTE
Secondary hyperparathyroidism due to end-stage renal disease.  Managed with phosphate binders and vitamin D.  Follows chronically as an outpatient in his clinic.     Photo Preface (Leave Blank If You Do Not Want): Photographs were obtained today Detail Level: Zone Rash

## 2025-05-07 NOTE — ASSESSMENT & PLAN NOTE
- Has chronic wounds affecting the left foot plantar surface, left foot 1st and 2nd digits  - Left Plantar Foot Wounds and Left toes:  Cleanse with NSS and pat dry. Paint all wounds with Betadine daily and cover plantar open wound with silicone bordered foam dressing. Naren with T for Treatment and change every other day or PRN   > 4/22 : Wound care recs: Paint left foot 1st and 2nd digits with betadine dailyand covering plantar open wound with foam. Podiatry consulted   > 4/22: Podiatry did bedside debridement of left foot plantar ulcer. Continue with local wound care, no surgical intervention needed. SALVATORE ordered and vascular surgery consulted. Pt to maintain frequent LLE elevation, strict pressure and fracture reduction. WBAT to LLE  > 4/24: Pictures of LLE reviewed on chart. Wounds are stable with no signs of infection. XR L foot 4/22 with no evidence of OM. ABIs and toe pressures are within healing range. Pt to continue to follow up with wound care for further management  > 5/6- Bleeding from L foot wound requiring dressing changes. Pt denies any trauma to the foot. He is currently refusing Eliquis due to this bleeding. Continue dressing changes PRN

## 2025-05-07 NOTE — ASSESSMENT & PLAN NOTE
The patient has end-stage renal disease hemodialysis is Tuesday Thursday Saturday.  He tolerated his procedure yesterday he said he was able to stay on the whole treatment.  Reviewing the dialysis nurses note he had 1.1 kg ultrafiltered and a 3.5-hour treatment.    Hemodialysis Tuesday Thursday Saturday  Reduce dry weight as tolerated

## 2025-05-07 NOTE — PLAN OF CARE
Problem: PAIN - ADULT  Goal: Verbalizes/displays adequate comfort level or baseline comfort level  Description: Interventions:- Encourage patient to monitor pain and request assistance- Assess pain using appropriate pain scale- Administer analgesics based on type and severity of pain and evaluate response- Implement non-pharmacological measures as appropriate and evaluate response- Consider cultural and social influences on pain and pain management- Notify physician/advanced practitioner if interventions unsuccessful or patient reports new pain  5/7/2025 1036 by Meaghan Ernandez RN  Outcome: Completed  5/7/2025 1035 by Meaghan Ernandez RN  Outcome: Progressing     Problem: INFECTION - ADULT  Goal: Absence or prevention of progression during hospitalization  Description: INTERVENTIONS:- Assess and monitor for signs and symptoms of infection- Monitor lab/diagnostic results- Monitor all insertion sites, i.e. indwelling lines, tubes, and drains- Monitor endotracheal if appropriate and nasal secretions for changes in amount and color- [place] appropriate cooling/warming therapies per order- Administer medications as ordered- Instruct and encourage patient and family to use good hand hygiene technique- Identify and instruct in appropriate isolation precautions for identified infection/condition  5/7/2025 1036 by Meaghan Ernandez RN  Outcome: Completed  5/7/2025 1035 by Meaghan Ernandez RN  Outcome: Progressing  Goal: Absence of fever/infection during neutropenic period  Description: INTERVENTIONS:- Monitor WBC  5/7/2025 1036 by Meaghan Ernandez RN  Outcome: Completed  5/7/2025 1035 by Meaghan Ernandez RN  Outcome: Progressing     Problem: SAFETY ADULT  Goal: Patient will remain free of falls  Description: INTERVENTIONS:- Educate patient/family on patient safety including physical limitations- Instruct patient to call for assistance with activity - Consult OT/PT to assist with strengthening/mobility - Keep Call bell within reach-  Keep bed low and locked with side rails adjusted as appropriate- Keep care items and personal belongings within reach- Initiate and maintain comfort rounds- Make Fall Risk Sign visible to staff- Offer Toileting every 2 Hours, in advance of need- Initiate/Maintain alarm- Obtain necessary fall risk management equipment: - Apply yellow socks and bracelet for high fall risk patients- Consider moving patient to room near nurses station  5/7/2025 1036 by Meaghan Ernandez RN  Outcome: Completed  5/7/2025 1035 by Meaghan Ernandez RN  Outcome: Progressing  Goal: Maintain or return to baseline ADL function  Description: INTERVENTIONS:-  Assess patient's ability to carry out ADLs; assess patient's baseline for ADL function and identify physical deficits which impact ability to perform ADLs (bathing, care of mouth/teeth, toileting, grooming, dressing, etc.)- Assess/evaluate cause of self-care deficits - Assess range of motion- Assess patient's mobility; develop plan if impaired- Assess patient's need for assistive devices and provide as appropriate- Encourage maximum independence but intervene and supervise when necessary- Involve family in performance of ADLs- Assess for home care needs following discharge - Consider OT consult to assist with ADL evaluation and planning for discharge- Provide patient education as appropriate  5/7/2025 1036 by Meaghan Ernandez RN  Outcome: Completed  5/7/2025 1035 by Meaghan Ernandez RN  Outcome: Progressing  Goal: Maintains/Returns to pre admission functional level  Description: INTERVENTIONS:- Perform AM-PAC 6 Click Basic Mobility/ Daily Activity assessment daily.- Set and communicate daily mobility goal to care team and patient/family/caregiver. - Collaborate with rehabilitation services on mobility goals if consulted- Perform Range of Motion 3 times a day.- Reposition patient every 2 hours.- Dangle patient 3 times a day- Stand patient 3 times a day- Ambulate patient 3 times a day- Out of bed to  chair 3 times a day - Out of bed for meals 3 times a day- Out of bed for toileting- Record patient progress and toleration of activity level   5/7/2025 1036 by Meaghan Ernandez RN  Outcome: Completed  5/7/2025 1035 by Meaghan Ernandez RN  Outcome: Progressing     Problem: DISCHARGE PLANNING  Goal: Discharge to home or other facility with appropriate resources  Description: INTERVENTIONS:- Identify barriers to discharge w/patient and caregiver- Arrange for needed discharge resources and transportation as appropriate- Identify discharge learning needs (meds, wound care, etc.)- Arrange for interpretive services to assist at discharge as needed- Refer to Case Management Department for coordinating discharge planning if the patient needs post-hospital services based on physician/advanced practitioner order or complex needs related to functional status, cognitive ability, or social support system  5/7/2025 1036 by Meaghan Ernandez RN  Outcome: Completed  5/7/2025 1035 by Meaghan Ernandez RN  Outcome: Progressing     Problem: Prexisting or High Potential for Compromised Skin Integrity  Goal: Skin integrity is maintained or improved  Description: INTERVENTIONS:- Identify patients at risk for skin breakdown- Assess and monitor skin integrity- Assess and monitor nutrition and hydration status- Monitor labs - Assess for incontinence - Turn and reposition patient- Assist with mobility/ambulation- Relieve pressure over bony prominences- Avoid friction and shearing- Provide appropriate hygiene as needed including keeping skin clean and dry- Evaluate need for skin moisturizer/barrier cream- Collaborate with interdisciplinary team - Patient/family teaching- Consider wound care consult   5/7/2025 1036 by Meaghan Ernandez RN  Outcome: Completed  5/7/2025 1035 by Meaghan Ernandez RN  Outcome: Progressing     Problem: METABOLIC, FLUID AND ELECTROLYTES - ADULT  Goal: Electrolytes maintained within normal limits  Description: INTERVENTIONS:- Monitor  labs and assess patient for signs and symptoms of electrolyte imbalances- Administer electrolyte replacement as ordered- Monitor response to electrolyte replacements, including repeat lab results as appropriate- Instruct patient on fluid and nutrition as appropriate  5/7/2025 1036 by Meaghan Ernandez RN  Outcome: Completed  5/7/2025 1035 by Meaghan Ernandez RN  Outcome: Progressing  Goal: Fluid balance maintained  Description: INTERVENTIONS:- Monitor labs - Monitor I/O and WT- Instruct patient on fluid and nutrition as appropriate- Assess for signs & symptoms of volume excess or deficit  5/7/2025 1036 by Meaghan Ernandez RN  Outcome: Completed  5/7/2025 1035 by Meaghan Ernandez RN  Outcome: Progressing     Problem: Nutrition/Hydration-ADULT  Goal: Nutrient/Hydration intake appropriate for improving, restoring or maintaining nutritional needs  Description: Monitor and assess patient's nutrition/hydration status for malnutrition. Collaborate with interdisciplinary team and initiate plan and interventions as ordered.  Monitor patient's weight and dietary intake as ordered or per policy. Utilize nutrition screening tool and intervene as necessary. Determine patient's food preferences and provide high-protein, high-caloric foods as appropriate. INTERVENTIONS:- Monitor oral intake, urinary output, labs, and treatment plans- Assess nutrition and hydration status and recommend course of action- Evaluate amount of meals eaten- Assist patient with eating if necessary - Allow adequate time for meals- Recommend/ encourage appropriate diets, oral nutritional supplements, and vitamin/mineral supplements- Order, calculate, and assess calorie counts as needed- Recommend, monitor, and adjust tube feedings and TPN/PPN based on assessed needs- Assess need for intravenous fluids- Provide specific nutrition/hydration education as appropriate- Include patient/family/caregiver in decisions related to nutrition  5/7/2025 1036 by Meaghan Ernandez  RN  Outcome: Completed  5/7/2025 1035 by Meaghan Ernandez RN  Outcome: Progressing     Problem: Nutrition/Hydration-ADULT  Goal: Nutrient/Hydration intake appropriate for improving, restoring or maintaining nutritional needs  Description: Monitor and assess patient's nutrition/hydration status for malnutrition. Collaborate with interdisciplinary team and initiate plan and interventions as ordered.  Monitor patient's weight and dietary intake as ordered or per policy. Utilize nutrition screening tool and intervene as necessary. Determine patient's food preferences and provide high-protein, high-caloric foods as appropriate. INTERVENTIONS:- Monitor oral intake, urinary output, labs, and treatment plans- Assess nutrition and hydration status and recommend course of action- Evaluate amount of meals eaten- Assist patient with eating if necessary - Allow adequate time for meals- Recommend/ encourage appropriate diets, oral nutritional supplements, and vitamin/mineral supplements- Order, calculate, and assess calorie counts as needed- Recommend, monitor, and adjust tube feedings and TPN/PPN based on assessed needs- Assess need for intravenous fluids- Provide specific nutrition/hydration education as appropriate- Include patient/family/caregiver in decisions related to nutrition  5/7/2025 1036 by Meaghan Ernandez RN  Outcome: Completed  5/7/2025 1035 by Meaghan Ernandez RN  Outcome: Progressing

## 2025-05-07 NOTE — PLAN OF CARE
Problem: PAIN - ADULT  Goal: Verbalizes/displays adequate comfort level or baseline comfort level  Description: Interventions:- Encourage patient to monitor pain and request assistance- Assess pain using appropriate pain scale- Administer analgesics based on type and severity of pain and evaluate response- Implement non-pharmacological measures as appropriate and evaluate response- Consider cultural and social influences on pain and pain management- Notify physician/advanced practitioner if interventions unsuccessful or patient reports new pain  Outcome: Progressing     Problem: INFECTION - ADULT  Goal: Absence or prevention of progression during hospitalization  Description: INTERVENTIONS:- Assess and monitor for signs and symptoms of infection- Monitor lab/diagnostic results- Monitor all insertion sites, i.e. indwelling lines, tubes, and drains- Monitor endotracheal if appropriate and nasal secretions for changes in amount and color- Marshall appropriate cooling/warming therapies per order- Administer medications as ordered- Instruct and encourage patient and family to use good hand hygiene technique- Identify and instruct in appropriate isolation precautions for identified infection/condition  Outcome: Progressing  Goal: Absence of fever/infection during neutropenic period  Description: INTERVENTIONS:- Monitor WBC  Outcome: Progressing     Problem: SAFETY ADULT  Goal: Patient will remain free of falls  Description: INTERVENTIONS:- Educate patient/family on patient safety including physical limitations- Instruct patient to call for assistance with activity - Consult OT/PT to assist with strengthening/mobility - Keep Call bell within reach- Keep bed low and locked with side rails adjusted as appropriate- Keep care items and personal belongings within reach- Initiate and maintain comfort rounds- Make Fall Risk Sign visible to staff- Offer Toileting every 2 Hours, in advance of need- Initiate/Maintain alarm-  Obtain necessary fall risk management equipment: - Apply yellow socks and bracelet for high fall risk patients- Consider moving patient to room near nurses station  Outcome: Progressing  Goal: Maintain or return to baseline ADL function  Description: INTERVENTIONS:-  Assess patient's ability to carry out ADLs; assess patient's baseline for ADL function and identify physical deficits which impact ability to perform ADLs (bathing, care of mouth/teeth, toileting, grooming, dressing, etc.)- Assess/evaluate cause of self-care deficits - Assess range of motion- Assess patient's mobility; develop plan if impaired- Assess patient's need for assistive devices and provide as appropriate- Encourage maximum independence but intervene and supervise when necessary- Involve family in performance of ADLs- Assess for home care needs following discharge - Consider OT consult to assist with ADL evaluation and planning for discharge- Provide patient education as appropriate  Outcome: Progressing  Goal: Maintains/Returns to pre admission functional level  Description: INTERVENTIONS:- Perform AM-PAC 6 Click Basic Mobility/ Daily Activity assessment daily.- Set and communicate daily mobility goal to care team and patient/family/caregiver. - Collaborate with rehabilitation services on mobility goals if consulted- Perform Range of Motion 3 times a day.- Reposition patient every 2 hours.- Dangle patient 3 times a day- Stand patient 3 times a day- Ambulate patient 3 times a day- Out of bed to chair 3 times a day - Out of bed for meals 3 times a day- Out of bed for toileting- Record patient progress and toleration of activity level   Outcome: Progressing     Problem: DISCHARGE PLANNING  Goal: Discharge to home or other facility with appropriate resources  Description: INTERVENTIONS:- Identify barriers to discharge w/patient and caregiver- Arrange for needed discharge resources and transportation as appropriate- Identify discharge learning  needs (meds, wound care, etc.)- Arrange for interpretive services to assist at discharge as needed- Refer to Case Management Department for coordinating discharge planning if the patient needs post-hospital services based on physician/advanced practitioner order or complex needs related to functional status, cognitive ability, or social support system  Outcome: Progressing     Problem: Prexisting or High Potential for Compromised Skin Integrity  Goal: Skin integrity is maintained or improved  Description: INTERVENTIONS:- Identify patients at risk for skin breakdown- Assess and monitor skin integrity- Assess and monitor nutrition and hydration status- Monitor labs - Assess for incontinence - Turn and reposition patient- Assist with mobility/ambulation- Relieve pressure over bony prominences- Avoid friction and shearing- Provide appropriate hygiene as needed including keeping skin clean and dry- Evaluate need for skin moisturizer/barrier cream- Collaborate with interdisciplinary team - Patient/family teaching- Consider wound care consult   Outcome: Progressing     Problem: METABOLIC, FLUID AND ELECTROLYTES - ADULT  Goal: Electrolytes maintained within normal limits  Description: INTERVENTIONS:- Monitor labs and assess patient for signs and symptoms of electrolyte imbalances- Administer electrolyte replacement as ordered- Monitor response to electrolyte replacements, including repeat lab results as appropriate- Instruct patient on fluid and nutrition as appropriate  Outcome: Progressing  Goal: Fluid balance maintained  Description: INTERVENTIONS:- Monitor labs - Monitor I/O and WT- Instruct patient on fluid and nutrition as appropriate- Assess for signs & symptoms of volume excess or deficit  Outcome: Progressing     Problem: Nutrition/Hydration-ADULT  Goal: Nutrient/Hydration intake appropriate for improving, restoring or maintaining nutritional needs  Description: Monitor and assess patient's nutrition/hydration  status for malnutrition. Collaborate with interdisciplinary team and initiate plan and interventions as ordered.  Monitor patient's weight and dietary intake as ordered or per policy. Utilize nutrition screening tool and intervene as necessary. Determine patient's food preferences and provide high-protein, high-caloric foods as appropriate. INTERVENTIONS:- Monitor oral intake, urinary output, labs, and treatment plans- Assess nutrition and hydration status and recommend course of action- Evaluate amount of meals eaten- Assist patient with eating if necessary - Allow adequate time for meals- Recommend/ encourage appropriate diets, oral nutritional supplements, and vitamin/mineral supplements- Order, calculate, and assess calorie counts as needed- Recommend, monitor, and adjust tube feedings and TPN/PPN based on assessed needs- Assess need for intravenous fluids- Provide specific nutrition/hydration education as appropriate- Include patient/family/caregiver in decisions related to nutrition  Outcome: Progressing     Problem: Nutrition/Hydration-ADULT  Goal: Nutrient/Hydration intake appropriate for improving, restoring or maintaining nutritional needs  Description: Monitor and assess patient's nutrition/hydration status for malnutrition. Collaborate with interdisciplinary team and initiate plan and interventions as ordered.  Monitor patient's weight and dietary intake as ordered or per policy. Utilize nutrition screening tool and intervene as necessary. Determine patient's food preferences and provide high-protein, high-caloric foods as appropriate. INTERVENTIONS:- Monitor oral intake, urinary output, labs, and treatment plans- Assess nutrition and hydration status and recommend course of action- Evaluate amount of meals eaten- Assist patient with eating if necessary - Allow adequate time for meals- Recommend/ encourage appropriate diets, oral nutritional supplements, and vitamin/mineral supplements- Order, calculate,  and assess calorie counts as needed- Recommend, monitor, and adjust tube feedings and TPN/PPN based on assessed needs- Assess need for intravenous fluids- Provide specific nutrition/hydration education as appropriate- Include patient/family/caregiver in decisions related to nutrition  Outcome: Progressing

## 2025-05-07 NOTE — ASSESSMENT & PLAN NOTE
Bilateral LE edema = chronic/stable  Renal diet  Volume management via HD   Next Steps:      Support:  Wear supportive shoes, sandals, boots and/or inserts that have a rigid supportive sole.    This will offload the majority of tension forces that travel through your feet every step you take.    Skechers Max Cushioning Elite/Premier   Skechers Relax Fit D'Lux Walker  Reebok Walk Ultra 7 DMX MAX   Hodebbie Bondi walking shoes  Oofos shoes/sandals (www.Oofos.com)  Haflinger shoes/slippers (https://haflingerusa.MedPlasts)  FoodFan sandals (https://www.Hapara/us)  Superfeet inserts (www.superfeet.com)  It is important that you also wear supportive shoe wear in the house to continue providing support to your feet.    You may always use a cushioned liner for your shoes if that makes your feet feel better.  Stretching  Calf stretching is essential to offload the tension forces that travel through your feet every step you take  Preferred calf stretch is the Runner's Stretch  Place one foot behind the other foot, flat against the ground (it is important to keep the heel on the ground).  The back leg is the one that will be stretched.  Start with the knee straight and lean your hips into the wall, counter or whatever you are leaning into - count to ten.  Next, bend the knee.  You should feel the stretch lower in the calf muscle - count to ten.  Repeat this stretch once an hour to start off with.  When symptoms subside, I recommend performing the stretch 3 times daily to prevent any future problems.                Tissue Massage  It is important that you physically loosen the inflammation tissue to help your body heal the injured tissue.  I recommend soaking your foot in warm water to increase the microcirculation to the soft tissues.  You may add Epson salt to the water if you prefer.  You may apply an over-the-counter muscle rub, such as Voltaren gel, and deeply massage the injured tissue.  Reduce Inflammation  You can ice the injured tissue with an ice pack with a light cloth covering or soaking  in ice water 20 minutes to reduce any acute inflammation, typically at the end of the day.      It is important to understand that most problems that develop in the foot and ankle are caused by excessive tension that cause microinjury to the soft tissues and inflammation in the foot and ankle.  By addressing the underlying causes with support and stretching as well as treating the current inflammatory conditions with tissue massage and anti-inflammatory treatments, most foot and ankle musculoskeletal conditions will resolve.  This may take time to heal.  However, if symptoms persist past 4 weeks you should return to the office for reevaluation to determine further treatment options.

## 2025-05-07 NOTE — ASSESSMENT & PLAN NOTE
- Sustained from mechanical fall several days prior to initial presentation on 4/2/25, presenting with progressively worsening low back pain. Trauma imaging revealed a large retroperitoneal hematoma with active extravasation and hemoglobin drop. Now s/p transfusion, Kcentra and ultimately IR embolization of the right deep circumflex iliac artery and superior gluteal artery with Dr. Garcia on 4/3/25. Unfortunately, on 4/6/25, his hemoglobin decreased and repeat CT showed a 5 mm inferior gluteal artery branch pseudoaneurysm and he is s/p IR pelvic angiogram on 4/7/25 with Dr. Hernandez, which demonstrated pseudoaneurysms at the distal branches of the left inferior gluteal artery which was embolized.   > 4/15: Hgb 7.2 Back pain seems controlled. Will be receiving increased Epogen today per IM/Nephro  > Hgb 9.7 on 5/4 labs    - Continue to trend Hgb on CBC closely  - Consider repeat imaging if Hgb continues to downtrend or pain significantly worsens  - Contact IR if there are additional bleeding concerns given multiple prior interventions described above  - Local right hip/groin access site incision care  - Analgesia, see below  - PT and OT

## 2025-05-07 NOTE — ASSESSMENT & PLAN NOTE
> Evaluated by APS on acute care  - Tylenol 975 mg q8h scheduled  - Gabapentin 100 mg three times weekly  - Methocarbamol 1000 mg q8h scheduled- decreasing dose  - Lidocaine patches daily PRN  - Oxycodone 5 q4h PRN with 2.5mg Q8 for breakthrough pain, wean as possible

## 2025-05-08 ENCOUNTER — APPOINTMENT (EMERGENCY)
Dept: RADIOLOGY | Facility: HOSPITAL | Age: 65
DRG: 555 | End: 2025-05-08
Payer: MEDICARE

## 2025-05-08 ENCOUNTER — HOSPITAL ENCOUNTER (INPATIENT)
Facility: HOSPITAL | Age: 65
LOS: 4 days | Discharge: NURSING, REHAB OR LONG-TERM CARE | DRG: 555 | End: 2025-05-13
Attending: EMERGENCY MEDICINE | Admitting: INTERNAL MEDICINE
Payer: MEDICARE

## 2025-05-08 ENCOUNTER — APPOINTMENT (OUTPATIENT)
Dept: DIALYSIS | Facility: HOSPITAL | Age: 65
DRG: 555 | End: 2025-05-08
Payer: MEDICARE
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
## 2025-05-08 DIAGNOSIS — G89.11 ACUTE PAIN DUE TO TRAUMA: ICD-10-CM

## 2025-05-08 DIAGNOSIS — M21.961: ICD-10-CM

## 2025-05-08 DIAGNOSIS — S22.079A T10 VERTEBRAL FRACTURE (HCC): ICD-10-CM

## 2025-05-08 DIAGNOSIS — S32.029A L2 VERTEBRAL FRACTURE (HCC): ICD-10-CM

## 2025-05-08 DIAGNOSIS — N18.6 ESRD (END STAGE RENAL DISEASE) (HCC): ICD-10-CM

## 2025-05-08 DIAGNOSIS — R26.2 AMBULATORY DYSFUNCTION: ICD-10-CM

## 2025-05-08 DIAGNOSIS — H35.00 RETINOPATHY: ICD-10-CM

## 2025-05-08 PROBLEM — E11.319 DIABETIC RETINOPATHY (HCC): Status: ACTIVE | Noted: 2025-05-08

## 2025-05-08 LAB
ANION GAP SERPL CALCULATED.3IONS-SCNC: 10 MMOL/L (ref 4–13)
ANION GAP SERPL CALCULATED.3IONS-SCNC: 12 MMOL/L (ref 4–13)
BASOPHILS # BLD AUTO: 0.04 THOUSANDS/ÂΜL (ref 0–0.1)
BASOPHILS NFR BLD AUTO: 1 % (ref 0–1)
BUN SERPL-MCNC: 25 MG/DL (ref 5–25)
BUN SERPL-MCNC: 29 MG/DL (ref 5–25)
CALCIUM SERPL-MCNC: 8 MG/DL (ref 8.4–10.2)
CALCIUM SERPL-MCNC: 8.7 MG/DL (ref 8.4–10.2)
CHLORIDE SERPL-SCNC: 96 MMOL/L (ref 96–108)
CHLORIDE SERPL-SCNC: 98 MMOL/L (ref 96–108)
CO2 SERPL-SCNC: 20 MMOL/L (ref 21–32)
CO2 SERPL-SCNC: 23 MMOL/L (ref 21–32)
CREAT SERPL-MCNC: 4.9 MG/DL (ref 0.6–1.3)
CREAT SERPL-MCNC: 5.83 MG/DL (ref 0.6–1.3)
EOSINOPHIL # BLD AUTO: 0.26 THOUSAND/ÂΜL (ref 0–0.61)
EOSINOPHIL NFR BLD AUTO: 3 % (ref 0–6)
ERYTHROCYTE [DISTWIDTH] IN BLOOD BY AUTOMATED COUNT: 18.5 % (ref 11.6–15.1)
GFR SERPL CREATININE-BSD FRML MDRD: 11 ML/MIN/1.73SQ M
GFR SERPL CREATININE-BSD FRML MDRD: 9 ML/MIN/1.73SQ M
GLUCOSE P FAST SERPL-MCNC: 92 MG/DL (ref 65–99)
GLUCOSE SERPL-MCNC: 90 MG/DL (ref 65–140)
GLUCOSE SERPL-MCNC: 92 MG/DL (ref 65–140)
HCT VFR BLD AUTO: 30.2 % (ref 36.5–49.3)
HGB BLD-MCNC: 9.4 G/DL (ref 12–17)
IMM GRANULOCYTES # BLD AUTO: 0.03 THOUSAND/UL (ref 0–0.2)
IMM GRANULOCYTES NFR BLD AUTO: 0 % (ref 0–2)
LYMPHOCYTES # BLD AUTO: 1.02 THOUSANDS/ÂΜL (ref 0.6–4.47)
LYMPHOCYTES NFR BLD AUTO: 13 % (ref 14–44)
MAGNESIUM SERPL-MCNC: 2.3 MG/DL (ref 1.9–2.7)
MCH RBC QN AUTO: 30.2 PG (ref 26.8–34.3)
MCHC RBC AUTO-ENTMCNC: 31.1 G/DL (ref 31.4–37.4)
MCV RBC AUTO: 97 FL (ref 82–98)
MONOCYTES # BLD AUTO: 0.76 THOUSAND/ÂΜL (ref 0.17–1.22)
MONOCYTES NFR BLD AUTO: 9 % (ref 4–12)
NEUTROPHILS # BLD AUTO: 6.05 THOUSANDS/ÂΜL (ref 1.85–7.62)
NEUTS SEG NFR BLD AUTO: 74 % (ref 43–75)
NRBC BLD AUTO-RTO: 0 /100 WBCS
PHOSPHATE SERPL-MCNC: 3.6 MG/DL (ref 2.3–4.1)
PLATELET # BLD AUTO: 231 THOUSANDS/UL (ref 149–390)
PMV BLD AUTO: 8.7 FL (ref 8.9–12.7)
POTASSIUM SERPL-SCNC: 3.7 MMOL/L (ref 3.5–5.3)
POTASSIUM SERPL-SCNC: 5.2 MMOL/L (ref 3.5–5.3)
RBC # BLD AUTO: 3.11 MILLION/UL (ref 3.88–5.62)
SODIUM SERPL-SCNC: 128 MMOL/L (ref 135–147)
SODIUM SERPL-SCNC: 131 MMOL/L (ref 135–147)
WBC # BLD AUTO: 8.16 THOUSAND/UL (ref 4.31–10.16)

## 2025-05-08 PROCEDURE — 80048 BASIC METABOLIC PNL TOTAL CA: CPT | Performed by: INTERNAL MEDICINE

## 2025-05-08 PROCEDURE — 85025 COMPLETE CBC W/AUTO DIFF WBC: CPT | Performed by: EMERGENCY MEDICINE

## 2025-05-08 PROCEDURE — 5A1D70Z PERFORMANCE OF URINARY FILTRATION, INTERMITTENT, LESS THAN 6 HOURS PER DAY: ICD-10-PCS | Performed by: INTERNAL MEDICINE

## 2025-05-08 PROCEDURE — 84100 ASSAY OF PHOSPHORUS: CPT | Performed by: EMERGENCY MEDICINE

## 2025-05-08 PROCEDURE — 99214 OFFICE O/P EST MOD 30 MIN: CPT | Performed by: INTERNAL MEDICINE

## 2025-05-08 PROCEDURE — 80048 BASIC METABOLIC PNL TOTAL CA: CPT | Performed by: EMERGENCY MEDICINE

## 2025-05-08 PROCEDURE — 90999 UNLISTED DIALYSIS PROCEDURE: CPT | Performed by: INTERNAL MEDICINE

## 2025-05-08 PROCEDURE — G0257 UNSCHED DIALYSIS ESRD PT HOS: HCPCS

## 2025-05-08 PROCEDURE — 99223 1ST HOSP IP/OBS HIGH 75: CPT | Performed by: INTERNAL MEDICINE

## 2025-05-08 PROCEDURE — 87147 CULTURE TYPE IMMUNOLOGIC: CPT | Performed by: INTERNAL MEDICINE

## 2025-05-08 PROCEDURE — 72131 CT LUMBAR SPINE W/O DYE: CPT

## 2025-05-08 PROCEDURE — 72125 CT NECK SPINE W/O DYE: CPT

## 2025-05-08 PROCEDURE — 99284 EMERGENCY DEPT VISIT MOD MDM: CPT

## 2025-05-08 PROCEDURE — 83735 ASSAY OF MAGNESIUM: CPT | Performed by: EMERGENCY MEDICINE

## 2025-05-08 PROCEDURE — 97163 PT EVAL HIGH COMPLEX 45 MIN: CPT

## 2025-05-08 PROCEDURE — 36415 COLL VENOUS BLD VENIPUNCTURE: CPT | Performed by: EMERGENCY MEDICINE

## 2025-05-08 PROCEDURE — 97167 OT EVAL HIGH COMPLEX 60 MIN: CPT

## 2025-05-08 PROCEDURE — 99285 EMERGENCY DEPT VISIT HI MDM: CPT | Performed by: EMERGENCY MEDICINE

## 2025-05-08 PROCEDURE — 87081 CULTURE SCREEN ONLY: CPT | Performed by: INTERNAL MEDICINE

## 2025-05-08 PROCEDURE — 70450 CT HEAD/BRAIN W/O DYE: CPT

## 2025-05-08 PROCEDURE — 97530 THERAPEUTIC ACTIVITIES: CPT

## 2025-05-08 RX ORDER — ACETAMINOPHEN 325 MG/1
650 TABLET ORAL EVERY 6 HOURS PRN
Status: DISCONTINUED | OUTPATIENT
Start: 2025-05-08 | End: 2025-05-13 | Stop reason: HOSPADM

## 2025-05-08 RX ORDER — OXYCODONE HYDROCHLORIDE 5 MG/1
5 TABLET ORAL EVERY 8 HOURS PRN
Status: DISCONTINUED | OUTPATIENT
Start: 2025-05-08 | End: 2025-05-13 | Stop reason: HOSPADM

## 2025-05-08 RX ORDER — ALLOPURINOL 100 MG/1
100 TABLET ORAL DAILY
Status: DISCONTINUED | OUTPATIENT
Start: 2025-05-09 | End: 2025-05-13 | Stop reason: HOSPADM

## 2025-05-08 RX ORDER — GABAPENTIN 100 MG/1
100 CAPSULE ORAL 3 TIMES WEEKLY
Status: DISCONTINUED | OUTPATIENT
Start: 2025-05-09 | End: 2025-05-13 | Stop reason: HOSPADM

## 2025-05-08 RX ORDER — LIDOCAINE 50 MG/G
2 PATCH TOPICAL DAILY
Status: DISCONTINUED | OUTPATIENT
Start: 2025-05-08 | End: 2025-05-13 | Stop reason: HOSPADM

## 2025-05-08 RX ORDER — DOCUSATE SODIUM 100 MG/1
100 CAPSULE, LIQUID FILLED ORAL 2 TIMES DAILY
Status: DISCONTINUED | OUTPATIENT
Start: 2025-05-08 | End: 2025-05-13 | Stop reason: HOSPADM

## 2025-05-08 RX ORDER — ONDANSETRON 4 MG/1
4 TABLET, ORALLY DISINTEGRATING ORAL EVERY 6 HOURS PRN
Status: DISCONTINUED | OUTPATIENT
Start: 2025-05-08 | End: 2025-05-13 | Stop reason: HOSPADM

## 2025-05-08 RX ORDER — SEVELAMER HYDROCHLORIDE 800 MG/1
1600 TABLET, FILM COATED ORAL
Status: DISCONTINUED | OUTPATIENT
Start: 2025-05-08 | End: 2025-05-13 | Stop reason: HOSPADM

## 2025-05-08 RX ORDER — ATORVASTATIN CALCIUM 40 MG/1
80 TABLET, FILM COATED ORAL
Status: DISCONTINUED | OUTPATIENT
Start: 2025-05-08 | End: 2025-05-13 | Stop reason: HOSPADM

## 2025-05-08 RX ORDER — ACETAMINOPHEN 325 MG/1
975 TABLET ORAL EVERY 8 HOURS SCHEDULED
Status: DISCONTINUED | OUTPATIENT
Start: 2025-05-08 | End: 2025-05-13 | Stop reason: HOSPADM

## 2025-05-08 RX ORDER — LIDOCAINE 50 MG/G
1 PATCH TOPICAL DAILY PRN
Status: DISCONTINUED | OUTPATIENT
Start: 2025-05-08 | End: 2025-05-08

## 2025-05-08 RX ORDER — POLYETHYLENE GLYCOL 3350 17 G/17G
17 POWDER, FOR SOLUTION ORAL DAILY PRN
Status: DISCONTINUED | OUTPATIENT
Start: 2025-05-08 | End: 2025-05-13 | Stop reason: HOSPADM

## 2025-05-08 RX ORDER — NIFEDIPINE 30 MG/1
30 TABLET, EXTENDED RELEASE ORAL
Status: DISCONTINUED | OUTPATIENT
Start: 2025-05-08 | End: 2025-05-13 | Stop reason: HOSPADM

## 2025-05-08 RX ORDER — GUAIFENESIN 200 MG/10ML
LIQUID ORAL 3 TIMES DAILY PRN
Status: DISCONTINUED | OUTPATIENT
Start: 2025-05-08 | End: 2025-05-13 | Stop reason: HOSPADM

## 2025-05-08 RX ORDER — OXYCODONE HYDROCHLORIDE 5 MG/1
5 TABLET ORAL ONCE
Refills: 0 | Status: COMPLETED | OUTPATIENT
Start: 2025-05-08 | End: 2025-05-08

## 2025-05-08 RX ORDER — METHOCARBAMOL 500 MG/1
500 TABLET, FILM COATED ORAL 3 TIMES DAILY PRN
Status: DISCONTINUED | OUTPATIENT
Start: 2025-05-08 | End: 2025-05-13 | Stop reason: HOSPADM

## 2025-05-08 RX ORDER — ACETAMINOPHEN 325 MG/1
975 TABLET ORAL ONCE
Status: COMPLETED | OUTPATIENT
Start: 2025-05-08 | End: 2025-05-08

## 2025-05-08 RX ADMIN — OXYCODONE HYDROCHLORIDE 5 MG: 5 TABLET ORAL at 06:10

## 2025-05-08 RX ADMIN — DOCUSATE SODIUM 100 MG: 100 CAPSULE, LIQUID FILLED ORAL at 17:01

## 2025-05-08 RX ADMIN — EPOETIN ALFA 2000 UNITS: 2000 SOLUTION INTRAVENOUS; SUBCUTANEOUS at 14:18

## 2025-05-08 RX ADMIN — ACETAMINOPHEN 975 MG: 325 TABLET, FILM COATED ORAL at 13:31

## 2025-05-08 RX ADMIN — METHOCARBAMOL 500 MG: 500 TABLET ORAL at 13:42

## 2025-05-08 RX ADMIN — OXYCODONE HYDROCHLORIDE 5 MG: 5 TABLET ORAL at 23:35

## 2025-05-08 RX ADMIN — Medication 6 MG: at 22:05

## 2025-05-08 RX ADMIN — SEVELAMER HYDROCHLORIDE 1600 MG: 800 TABLET, FILM COATED ORAL at 17:02

## 2025-05-08 RX ADMIN — ACETAMINOPHEN 975 MG: 325 TABLET, FILM COATED ORAL at 07:22

## 2025-05-08 RX ADMIN — ACETAMINOPHEN 975 MG: 325 TABLET, FILM COATED ORAL at 22:05

## 2025-05-08 RX ADMIN — OXYCODONE HYDROCHLORIDE 5 MG: 5 TABLET ORAL at 12:34

## 2025-05-08 RX ADMIN — EPOETIN ALFA 3000 UNITS: 3000 SOLUTION INTRAVENOUS; SUBCUTANEOUS at 14:18

## 2025-05-08 RX ADMIN — METHOCARBAMOL 500 MG: 500 TABLET ORAL at 23:35

## 2025-05-08 RX ADMIN — ATORVASTATIN CALCIUM 80 MG: 40 TABLET, FILM COATED ORAL at 17:01

## 2025-05-08 RX ADMIN — SEVELAMER HYDROCHLORIDE 1600 MG: 800 TABLET, FILM COATED ORAL at 12:35

## 2025-05-08 RX ADMIN — NIFEDIPINE 30 MG: 30 TABLET, EXTENDED RELEASE ORAL at 17:01

## 2025-05-08 NOTE — ASSESSMENT & PLAN NOTE
Mechanical fall today.  Planing of low back pain.    CT spine lumbar showed stable obliquely oriented fracture extending through the L2 vertebral body without retropulsion or extension of the posterior elements  PT/OT  Prn analgesics.

## 2025-05-08 NOTE — CASE MANAGEMENT
Case Management Assessment & Discharge Planning Note    Patient name Naresh Carpio  Location [address]-* MRN [id number]  : 1960 Date 2025       Current Admission Date: 2025  Current Admission Diagnosis:Ambulatory dysfunction   Patient Active Problem List    Diagnosis Date Noted Date Diagnosed    Ambulatory dysfunction 2025     Diabetic retinopathy (HCC) 2025     At risk for acid-base imbalance 2025     Electrolyte imbalance risk 2025     Hallucinations 2025     Type 2 diabetes mellitus with foot ulcer, without long-term current use of insulin (HCC) 2025     Insomnia 2025     Hyperphosphatemia 2025     Wound of skin 2025     At high risk for skin breakdown 2025     At risk for venous thromboembolism (VTE) 2025     Impaired mobility and activities of daily living 2025     Visual disturbance 2025     Pseudoaneurysm ([facility]) 2025     Acute pain due to trauma 2025     ESRD on dialysis ([facility]) 2025     Wounds, multiple 2025     Traumatic retroperitoneal hemorrhage 2025     Secondary hyperparathyroidism ([facility]) 2025     At risk for electrolyte imbalance 2025     Multiple open wounds of foot 2025     Disorder of acid-base balance 2025     L2 vertebral fracture ([facility]) 2025     Non-compliance with treatment 2025     Generalized weakness 2025     Gout 2025     Closed fracture of proximal end of left humerus with routine healing 02/10/2025     Left shoulder pain 2025     ESRD (end stage renal disease) on dialysis ([facility]) 2025     Primary hypertension 2025     Anemia in ESRD (end-stage renal disease)  ([facility]) 2025     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, on chronic dialysis ([facility]) 2025     Renal lesion 2024     Chronic wound 2024     Chronic diastolic  (congestive) heart failure ([facility]) 11/19/2024     Pleural effusion 11/19/2024     Paroxysmal atrial fibrillation ([facility])      ESRD (end stage renal disease) ([facility]) 10/25/2024     Hyponatremia 10/19/2024     Acute on chronic anemia 10/14/2024     PAD (peripheral artery disease) ([facility]) 10/08/2024     Hyperkalemia 04/06/2024     Difficulty with speech 03/19/2024     History of amputation of hallux ([facility]) 03/18/2024     At risk for constipation 09/06/2023     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 mellitus, with necrosis of bone ([facility])      Acquired deformity of foot, right      Charcot's joint      Open wound of right foot 08/21/2023     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 mellitus, with bone involvement without evidence of necrosis ([facility])      Diabetic ulcer of left midfoot associated with type 2 diabetes mellitus, limited to breakdown of skin ([facility])      Diabetic polyneuropathy associated with type 2 diabetes mellitus ([facility])      Diabetes mellitus type 2 with peripheral artery disease ([facility])      History of amputation of lesser toe of left foot ([facility])      Onychomycosis      Traumatic retroperitoneal hematoma 08/19/2023     Osteoarthritis of left hip 07/27/2022     Severe Left Orchalgia 07/26/2022     Right shoulder pain 07/26/2022     Left hip pain 07/06/2022     Hemodialysis status ([facility])      Hypervolemia      Anemia due to chronic kidney disease, on chronic dialysis ([facility]) 01/21/2022     History of prediabetes      Hypertension      History of TIA (transient ischemic attack)      T10 vertebral fracture ([facility])        LOS (days): 0  Geometric Mean LOS (GMLOS) (days):   Days to GMLOS:     OBJECTIVE:      Current admission status: Observation     Preferred Pharmacy:   [id number] - [place], [place] - [address]  [facility]  [address] [address]  Phone: [phone] Fax: [phone]    [id number] - [address]  [facility]  [address] [place] [address] [address] [address]  Phone: [phone] Fax: [phone]    Primary Care Provider: Jeffrey Jackson    Primary Insurance: MEDICARE  Secondary Insurance:     ASSESSMENT:  Active Health Care Proxies       Alicja Carpio Health Care Representative - Sister   Primary Phone: [phone] (Mobile)            Readmission Root Cause  30 Day Readmission: Yes  During your hospital stay, did someone (provider, nurse, ) explain your care to you in a way you could understand?: Yes  Did you feel medically stable to leave the hospital?: Yes  Were you able to pay for your medication at the pharmacy?: Yes  Did you have reliable transportation to take you to your appointments?: Yes  During previous admission, was a post-acute recommendation made?: Yes  What post-acute resources were offered?: HHC (D/C home from [facility] on [facility]/7 with referral to [facility])  Patient was readmitted due to: Fall; ambulatory dysfunction  Action Plan: PT/OT evaluation, medical management    Patient Information  Admitted from:: Home  Mental Status: Alert  Assessment information provided by:: Patient  Primary Caregiver: Self  Support Systems: Family members  County [place] Residence: [facility] [place] [facility] [place]  What city do you live in?: [place], [place]  Home entry access options. Select all that apply.: Stairs  Number of steps to enter home.: One Flight  Type of Current Residence: Apartment  Floor Level: 1  Living Arrangements: Lives Alone    Activities of Daily Living Prior to Admission  Functional Status: Independent  Completes ADLs independently?: No  Level of ADL dependence: Assistance  Ambulates independently?: Yes  Does patient use assisted devices?: Yes  Assisted Devices (DME) used: Other (Comment) (TLSO brace)  Does patient currently own DME?: Yes  What DME does the patient currently own?: Walker, Straight Cane, Bedside Commode  Does the patient have a history of Short-Term Rehab?: Yes  Does patient have a history of HHC?:  Yes  Does patient currently have HHC?: No (Referral was made to [facility] upon discharge yesterday from [facility]; however, agency was able to start services before readmission.)    Current Home Health Care  Home Health Agency Name:: [facility]    Patient Information Continued  Income Source: SSI/SSD  Does patient have prescription coverage?: Yes  Can the patient afford their medications and any related supplies (such as glucometers or test strips)?: Yes  Does patient receive dialysis treatments?: Yes ([facility]; TTS; 1000 via sister/brother-in-law)     Means of [facility]    DISCHARGE DETAILS:    Discharge planning discussed with:: Patient  Freedom of Choice: Yes        Were Treatment Team discharge recommendations reviewed with patient/caregiver?: Yes  Did patient/caregiver verbalize understanding of patient care needs?: Yes  Were patient/caregiver advised of the risks associated with not following Treatment Team discharge recommendations?: Yes    Contacts  Patient Contacts: Alicja Carpio (Sister)  Relationship to Patient:: Family  Contact Method: Phone  Phone Number: [phone]  Reason/Outcome: Emergency Contact, Discharge Planning, Continuity of Care    Requested Home Health Care         Is the patient interested in HHC at discharge?: Yes  Home Health Discipline requested:: Occupational Therapy, Nursing, Physical Therapy  Home Health Agency Name:: [facility]  HHA External Referral Reason (only applicable if external HHA name selected): Services not provided in network or near patient location  Home Health Follow-Up Provider:: PCP  Home Health Services Needed:: Evaluate Functional Status and Safety, Gait/ADL Training, Strengthening/Theraputic Exercises to Improve Function  Homebound Criteria Met:: Uses an Assist Device (i.e. cane, walker, etc), Requires the Assistance of Another Person for Safe Ambulation or to Leave the  Home  Supporting Clincal Findings:: Limited Endurance, Fatigues Easliy in Short Distances     Other Referral/Resources/Interventions Provided:  Interventions: HHC  Referral Comments: DANIKA referral sent via [facility] to [facility].     Treatment Team Recommendation: Short Term Rehab, Home with Home Health Care  Discharge Destination Plan:: Home with Home Health Care  Transport at Discharge : Family    SW met bedside with patient to introduce role, complete assessment, and discuss discharge plan. Patient states he discharged home from [facility] yesterday 5/7 with [facility] services through [facility]. Patient states that he has a hx of a vision impairment in the L eye. Recently, he has developed a vision issue in the R eye that is now preventing him from driving. Patient agreed to move HD clinics from [facility] to [facility]. Chair time was confirmed for T-T-S at 10:00AM. Patient was to have first treatment in clinic on day he was admitted to the hospital.     Patient's sister/brother-in-law agreed to transport patient to/from HD until patient follows-up OP with an eye doctor or until a long term transport plan has been determined. Patient states that they are already discussing a car transport service that they may use that provides a discount for HD patients.     Patient states that his sister has been very helpful. She is assisting with grocery shopping and obtaining meals that he is able to prepare with his vision loss. Patient states that his sister is also helping with med management. Patient now is choosing to use [facility] in [place]. SW discussed using a pharmacy that can provide pill packs or offer home delivery. Patient stated he will consider and discuss with sister but for now he wants to continue using [facility].     Patient states his discharge plan of choice is to return home with continued in-home services through [facility]. Patient noted his sister will be bringing in his TLSO brace  from home later this evening so he can work with therapy tomorrow. Patient stated he is motivated to retain the mobility gains he made while at [facility].     Patient denies having any additional discharge needs at this time that SW can assist with.    DANIKA referral sent via Aidin to [facility]. Agency confirmed they were unaware that patient discharged from [facility]; however, they are still accepting referral and request to be updated re discharge date.     Attending updated re above.

## 2025-05-08 NOTE — PLAN OF CARE
Post-Dialysis RN Treatment Note    Blood Pressure:  Pre: 151/58 mm/Hg  Post:  148/66 mmHg   EDW:  96.5 kg    Weight:  Pre: 92.9 kg  Post: 93.2 kg, weight discrepancy due to different scales   Mode of weight measurement: Bed Scale pre tx, standing scale post tx.   Volume Removed:  2000 ml    Treatment duration: 240 minutes    NS given:  No    Treatment shortened:   No   Medications given during Rx:  Renagel, Oxycodone, Epogen ordered, medication requested from pharmacy, but not delivered before treatment termination   Estimated Kt/V:  Not Applicable   Access type: Permacath/TDC   Needle Gauge: N/A   Access Issues: No    Report called to primary nurse:   Yes, Cecily    Problem: METABOLIC, FLUID AND ELECTROLYTES - ADULT  Goal: Electrolytes maintained within normal limits  Description: INTERVENTIONS:- Monitor labs and assess patient for signs and symptoms of electrolyte imbalances- Administer electrolyte replacement as ordered- Monitor response to electrolyte replacements, including repeat lab results as appropriate- Instruct patient on fluid and nutrition as appropriate  Outcome: Progressing  Goal: Fluid balance maintained  Description: INTERVENTIONS:- Monitor labs - Monitor I/O and WT- Instruct patient on fluid and nutrition as appropriate- Assess for signs & symptoms of volume excess or deficit  Outcome: Progressing

## 2025-05-08 NOTE — CASE MANAGEMENT
"Team dc summary - pt made gains and returned home w/support from his sister Marlene and [facility] services through [facility] for rn pt ot and social work services. Family training/education occurred with pts sister prior to dc. Pt received a commode through [place] prior to dc.     On the morning of dc pt stated he was not ready to go home. Cm sat at length with pt and discussed his feeling of not being ready. Pt was not able to verbalize that he had functional deficits or medical concerns that warranted his stay.  Pt asked \"can't I just stay here\". Cm asked pt to identify what would change with a few extra days.  Cm reviewed pts abilities as he demonstrates with therapy and that he can function at home. Cm identified pts anxiety as the barrier and provided coping strategies and support.  Cm reviewed services in place and the information that his sister Marlene had to help with pts transition to home and success at home. During the time Marlene phoned pts room and both pt and cm s/w marlene. She planned on picking pt up at 1600. Pt expressed concern about living alone. Cm offered for pt to go to a skilled nursing facility but he declined. Asking to \"just stay here\".  Cm explained this is not a residential facility and he has achieved his goals to return home. Cm reminded him social work services are referred to help him with alternative housing and resources for him to potentially get additional help at home. Pt felt CM had explained enough and dismissed her from the room.     At 1500 pts sister phoned stating her sig other who was the  had a retinal tear and was having it repaired. Marlene was investigating other options to  pt.  1558 Marlene was not able to secure a ride for pt, cm arranged for a lyft to his [facility] and Marlene was taking a cab to meet him there. Pt was made aware of the arrangements.   "

## 2025-05-08 NOTE — LETTER
Thank you for allowing us to participate in the care of your patient, Naresh Carpio, who was hospitalized from [unfilled] through 5/13/2025 with the admitting diagnosis of ambulatory dysfunction secondary to back pain.  During the hospitalization we discussed retinopathy with ophthalmology on-call who recommended close outpatient follow-up.     Medication Changes:  None     Outpatient testing recommended:  none    If you have any additional questions or would like to discuss further, please feel free to contact me.    Nirav Bruce MD  [facility], Hospitalist  [phone]

## 2025-05-08 NOTE — ASSESSMENT & PLAN NOTE
Lab Results   Component Value Date    EGFR 11 05/08/2025    EGFR 9 05/08/2025    EGFR 8 05/05/2025    CREATININE 4.90 (H) 05/08/2025    CREATININE 5.83 (H) 05/08/2025    CREATININE 6.09 (H) 05/05/2025   Hgb stable 9.4  Monitor  Epogen per nephrology

## 2025-05-08 NOTE — ASSESSMENT & PLAN NOTE
"Lab Results   Component Value Date    HGBA1C 5.7 (H) 02/09/2025       No results for input(s): \"POCGLU\" in the last 72 hours.  SSI      "

## 2025-05-08 NOTE — ASSESSMENT & PLAN NOTE
Lab Results   Component Value Date    EGFR 11 05/08/2025    EGFR 9 05/08/2025    EGFR 8 05/05/2025    CREATININE 4.90 (H) 05/08/2025    CREATININE 5.83 (H) 05/08/2025    CREATININE 6.09 (H) 05/05/2025   HD by nephrology

## 2025-05-08 NOTE — PLAN OF CARE
Problem: PHYSICAL THERAPY ADULT  Goal: Performs mobility at highest level of function for planned discharge setting.  See evaluation for individualized goals.  Description: Treatment/Interventions: Bed mobility, Gait training, Equipment eval/education, Patient/family training, Therapeutic exercise, Elevations, LE strengthening/ROM, Functional transfer training, ADL retraining          See flowsheet documentation for full assessment, interventions and recommendations.  Note: Prognosis: Good  Problem List: Decreased strength, Decreased range of motion, Impaired balance, Decreased mobility, Pain  Assessment: Pt is 64 y.o. male seen for PT evaluation s/p admit to [facility] on 5/8/2025 w/ Ambulatory dysfunction. PT consulted to assess pt's functional mobility and d/c needs. Order placed for PT eval and tx.  Co-morbidities affecting patient's physical performance include: ESRD on HD, CHF, htn, DM, polyneuropathy, charcot joint, subacute L2 fx.  Personal factors affecting patient at time of initial evaluation include: ambulating with assistive device, stairs to enter home, inability to ambulate household distances, inability to perform dynamic tasks in community, limited home support, positive fall history, and inability to live alone.     Prior to admission, patient was just released from [facility], pt was ambulatory with a walker and LSO.  Upon evaluation: Pt was able to transfers       Please find objective findings from Physical Therapy assessment regarding body systems outlined above with impairments and limitations including weakness, impaired balance, gait deviations, pain, decreased activity tolerance, decreased functional mobility tolerance, fall risk, and orthopedic restrictions.The Barthel Index was used as a functional outcome tool presenting with a score of Barthel Index Score: 55 today indicating marked limitations of functional mobility and ADLS.  Patient's clinical presentation is currently  unstable/unpredictable as seen in patient's presentation of changing level of pain, increased fall risk, new onset of impairment of functional mobility, and new onset of weakness. Pt would benefit from continued Physical Therapy treatment to address deficits as defined above and maximize level of functional mobility.     As demonstrated by objective findings, the assigned level of complexity for this evaluation is high.The patient's AM-PAC Basic Mobility Inpatient Short Form Raw Score is 15. A Raw score of greater than 16 suggests the patient may benefit from discharge to post-acute rehabilitation services. Please also refer to the recommendation of the Physical Therapist for safe discharge planning.        Rehab Resource Intensity Level, PT: II (Moderate Resource Intensity)    See flowsheet documentation for full assessment.

## 2025-05-08 NOTE — ASSESSMENT & PLAN NOTE
Wt Readings from Last 3 Encounters:   05/08/25 93.2 kg (205 lb 8 oz)   05/05/25 96.6 kg (213 lb)   04/11/25 98.5 kg (217 lb 2.5 oz)   Volume management by HD

## 2025-05-08 NOTE — OCCUPATIONAL THERAPY NOTE
"OT EVALUATION       05/08/25 1410   OT Last Visit   OT Visit Date 05/08/25   Note Type   Note type Evaluation   Pain Assessment   Pain Assessment Tool 0-10   Pain Score 6   Pain Location/Orientation Location: Back   Restrictions/Precautions   Braces or Orthoses LSO  (not present, sister will be bringing in)   Other Precautions Chair Alarm;Bed Alarm;Fall Risk;Pain   Home Living   Type of Home House   Home Layout One level  (7 NAZARIO)   Bathroom Shower/Tub   (pt sponge bathes only due to port for dialysis)   Bathroom Toilet Raised   Bathroom Equipment Commode   Home Equipment Walker   Additional Comments pt just discharged from [facility] after 1 day prior to admission   Prior Function   Level of [place] Needs assistance with ADLs;Independent with functional mobility;Needs assistance with IADLS   Lives With Family  (sister)   Receives Help From Family   IADLs Family/Friend/Other provides transportation;Family/Friend/Other provides meals;Family/Friend/Other provides medication management   Comments L2 fracture on 3/24/25 has been wearing an LSO   Lifestyle   Reciprocal Relationships supportive sister   Subjective   Subjective \"I want to go home\"   ADL   Eating Assistance 5  Supervision/Setup   Grooming Assistance 4  Minimal Assistance   UB Bathing Assistance 4  Minimal Assistance   LB Bathing Assistance 3  Moderate Assistance   UB Dressing Assistance 4  Minimal Assistance   LB Dressing Assistance 3  Moderate Assistance   Toileting Assistance  3  Moderate Assistance   Bed Mobility   Supine to Sit 3  Moderate assistance   Additional items Assist x 1;Verbal cues   Sit to Supine 4  Minimal assistance   Additional items Assist x 1   Transfers   Sit to Stand 4  Minimal assistance   Additional items Assist x 2;Verbal cues   Stand to Sit 4  Minimal assistance   Additional items Assist x 2;Verbal cues   Stand pivot 4  Minimal assistance   Additional items Assist x 1   Toilet transfer 4  Minimal assistance   Additional items Assist x " 1;Commode  (pt insistant on using commode, unable to void, session limited as pt does not have LSO)   Balance   Static Sitting Fair   Dynamic Sitting Fair   Static Standing Fair -   Dynamic Standing Poor +   Activity Tolerance   Activity Tolerance Patient limited by fatigue;Patient limited by pain   RUE Assessment   RUE Assessment WFL   LUE Assessment   LUE Assessment WFL   Cognition   Overall Cognitive Status WFL   Arousal/Participation Cooperative   Attention Attends with cues to redirect   Orientation Level Oriented X4   Following Commands Follows multistep commands with increased time or repetition   Assessment   Limitation Decreased ADL status;Decreased UE strength;Decreased Safe judgement during ADL;Decreased endurance;Decreased high-level ADLs;Decreased self-care trans  (decreased balance and mobility)   Prognosis Good   Assessment Patient evaluated by Occupational Therapy.  Patient admitted with Ambulatory dysfunction.  The patients occupational profile, medical and therapy history includes a extensive additional review of physical, cognitive, or psychosocial history related to current functional performance.  Comorbidities affecting functional mobility and ADLS include: Afib, diabetes, ESRD on hemodialysis, hypertension, and TIA, L2 fracture, L toe amputation.  Prior to admission, patient was independent with functional mobility with walker, requiring assist for ADLS, and requiring assist for IADLS.  The evaluation identifies the following performance deficits: weakness, impaired balance, decreased endurance, increased fall risk, new onset of impairment of functional mobility, decreased ADLS, decreased IADLS, pain, decreased activity tolerance, decreased safety awareness, impaired judgement, and decreased strength, that result in activity limitations and/or participation restrictions. This evaluation requires clinical decision making of high complexity, because the patient presents with comorbidites that  affect occupational performance and required significant modification of tasks or assistance with consideration of multiple treatment options.  The Barthel Index was used as a functional outcome tool presenting with a score of Barthel Index Score: 55, indicating marked limitations of functional mobility and ADLS.  The patient's raw score on the -PAC Daily Activity Inpatient Short Form is 17. A raw score of less than 19 suggests the patient may benefit from discharge to post-acute rehabilitation services. Please refer to the recommendation of the Occupational Therapist for safe discharge planning.  Patient will benefit from skilled Occupational Therapy services to address above deficits and facilitate a safe return to prior level of function.   Goals   Patient Goals to go home   STG Time Frame   (1-7 days)   Short Term Goal  Goals established to promote Patient Goals: to go home:  Grooming: min assist standing at sink; Bathing: min assist; Upper Body Dressing supervision; Lower Body Dressing: min assist; Toileting: min assist; Patient will increase stand pivot commode transfer to min assist with rolling walker to increase performance and safety with ADLS and functional mobility; Patient will increase standing tolerance to 3 minutes during ADL task to decrease assistance level and decrease fall risk; Patient will increase bed mobility to min assist in preparation for ADLS and transfers; Patient will increase functional mobility to and from bathroom with rolling walker with min assist to increase performance with ADLS and to use a toilet; Patient will tolerate 5 minutes of UE ROM/strengthening to increase general activity tolerance and performance in ADLS/IADLS; Patient will improve functional activity tolerance to 10 minutes of sustained functional tasks to increase participation in basic self-care and decrease assistance level;  Patient will increase dynamic standing balance to fair- to improve postural stability  and decrease fall risk during standing ADLS and transfers.   LTG Time Frame   (8-14 days)   Long Term Goal Grooming: supervision standing at sink; Bathing: supervision; Upper Body Dressing independent; Lower Body Dressing: supervision; Toileting: supervision; Patient will increase stand pivot commode transfer to supervision with rolling walker to increase performance and safety with ADLS and functional mobility; Patient will increase standing tolerance to 6 minutes during ADL task to decrease assistance level and decrease fall risk; Patient will increase bed mobility to supervision in preparation for ADLS and transfers; Patient will increase functional mobility to and from bathroom with rolling walker with supervision to increase performance with ADLS and to use a toilet; Patient will tolerate 10 minutes of UE ROM/strengthening to increase general activity tolerance and performance in ADLS/IADLS; Patient will improve functional activity tolerance to 20 minutes of sustained functional tasks to increase participation in basic self-care and decrease assistance level;  Patient will increase dynamic standing balance to fair to improve postural stability and decrease fall risk during standing ADLS and transfers.  Pt will score >/= 21/24 on AM-PAC Daily Activity Inpatient scale to promote safe independence with ADLs and functional mobility; Pt will score >/= 85/100 on Barthel Index in order to decrease caregiver assistance needed and increase ability to perform ADLs and functional mobility.   Plan   Treatment Interventions ADL retraining;Functional transfer training;UE strengthening/ROM;Endurance training;Patient/family training;Equipment evaluation/education;Activityengagement;Compensatory technique education   Goal Expiration Date 05/22/25   OT Frequency 3-5x/wk   Discharge Recommendation   Rehab Resource Intensity Level, OT II (Moderate Resource Intensity)   AM-PAC Daily Activity Inpatient   Lower Body Dressing 2    Bathing 2   Toileting 3   Upper Body Dressing 3   Grooming 3   Eating 4   Daily Activity Raw Score 17   Daily Activity Standardized Score (Calc for Raw Score >=11) 37.26   AM-PAC Applied Cognition Inpatient   Following a Speech/Presentation 4   Understanding Ordinary Conversation 4   Taking Medications 4   Remembering Where Things Are Placed or Put Away 4   Remembering List of 4-5 Errands 4   Taking Care of Complicated Tasks 4   Applied Cognition Raw Score 24   Applied Cognition Standardized Score 62.21   Barthel Index   Feeding 10   Bathing 0   Grooming Score 0   Dressing Score 5   Bladder Score 10   Bowels Score 10   Toilet Use Score 5   Transfers (Bed/Chair) Score 10   Mobility (Level Surface) Score 0   Stairs Score 5   Barthel Index Score 55   Licensure   [place] License Number  Lizeth Phillips MS OTR/L [id number]

## 2025-05-08 NOTE — H&P
"H&P - Hospitalist   Name: Naresh Carpio 64 y.o. male I MRN: [id number]  Unit/Bed#: 4 [place] 414-01 I Date of Admission: 5/8/2025   Date of Service: 5/8/2025 I Hospital Day: 0     Assessment & Plan  Ambulatory dysfunction  Mechanical fall today.  Planing of low back pain.    CT spine lumbar showed stable obliquely oriented fracture extending through the L2 vertebral body without retropulsion or extension of the posterior elements  PT/OT  Prn analgesics.   Anemia due to chronic kidney disease, on chronic dialysis ([facility])  Lab Results   Component Value Date    EGFR 11 05/08/2025    EGFR 9 05/08/2025    EGFR 8 05/05/2025    CREATININE 4.90 (H) 05/08/2025    CREATININE 5.83 (H) 05/08/2025    CREATININE 6.09 (H) 05/05/2025   Hgb stable 9.4  Monitor  Epogen per nephrology   Hypertension  Continue procardia 30 mg qd.   Diabetes mellitus type 2 with peripheral artery disease ([facility])  Lab Results   Component Value Date    HGBA1C 5.7 (H) 02/09/2025       No results for input(s): \"POCGLU\" in the last 72 hours.  SSI      Hyponatremia  Management per nephrology with HD  ESRD (end stage renal disease) ([facility])  Lab Results   Component Value Date    EGFR 11 05/08/2025    EGFR 9 05/08/2025    EGFR 8 05/05/2025    CREATININE 4.90 (H) 05/08/2025    CREATININE 5.83 (H) 05/08/2025    CREATININE 6.09 (H) 05/05/2025   HD by nephrology   Chronic diastolic (congestive) heart failure ([facility])  Wt Readings from Last 3 Encounters:   05/08/25 93.2 kg (205 lb 8 oz)   05/05/25 96.6 kg (213 lb)   04/11/25 98.5 kg (217 lb 2.5 oz)   Volume management by HD    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, on chronic dialysis ([facility])  Dialysis management by nephrology  Continue Renagel   Secondary hyperparathyroidism (HCC)  Secondary to ESRD. Nephrology managing.   Diabetic retinopathy ([facility])  Seen by ophthalmology on 4/12 at [facility] due to floaters and blurry vision  Proliferative diabetic retinopathy with vitreous " hemorrhage OU.    Ophthalmology recommended OP follow-up immediately after discharge for ongoing management and referral to a retina specialist.  CTH with Increased density within the right globe is unchanged from the examination performed 5/3/2025 but new compared to a prior MRI dated 10/9/2024. Findings may be related to retinal detachment.   Discussed with ophthalmology Dr. Chaudhry who recommended ambulatory referral to the [facility] eye clinic. [phone] for retina specialist.         VTE Pharmacologic Prophylaxis:    Maintained on Eliquis however declining his recent retroperitoneal bleed  Code Status: Level 1 - Full Code       Anticipated Length of Stay: Patient will be admitted on an observation basis with an anticipated length of stay of less than 2 midnights secondary to ambulatory dysfunction needing PT OT evaluation, allergy consult for ESRD HD.    History of Present Illness   Chief Complaint: david Carpio is a 64 y.o. male with a PMH of ESRD managed by hemodialysis, L2 vertebral fracture, type 2 diabetes mellitus, diabetic retinopathy, PAD with chronic lower extremity wounds, chronic diastolic heart failure, hypertension who presents with fall.  He had recent hospitalization for traumatic retroperitoneal hematoma and was discharged to [facility] for inpatient rehabilitation and was discharged on May 7 unfortunately he is back today with a fall.  Reports that he woke up this morning and was rushing to go to the dialysis and fell at home.  He is currently complaining of low back pain.  CT spine lumbar showed stable obliquely oriented fracture extending through the L2 vertebral body without retropulsion or extension of the posterior elements.  Laboratory workup showed stable hemoglobin at baseline of 9.4.  Admitted for management of ambulatory dysfunction with PT OT.  Nephrology consulted for management of ESRD    Review of Systems negative apart from HPI    Historical Information   Past Medical  History:   Diagnosis Date    Acute cystitis 10/18/2024    Acute on chronic anemia 01/23/2022    Atrial fibrillation (HCC)     Bilateral sacral fracture, closed (HCC) 07/03/2024    Bradycardia 09/05/2023    Diabetes mellitus (HCC)     ESRD (end stage renal disease) on dialysis (HCC)     Hematuria 10/10/2024    Hematuria 10/10/2024    Hyperkalemia 04/06/2024    Hyperkalemia 04/06/2024    Hyperlipidemia     Hypertension     Left toe amputee (HCC)     Subacute osteomyelitis of right foot (HCC)     TIA (transient ischemic attack)     Toxic metabolic encephalopathy 10/08/2024    Traumatic rhabdomyolysis (HCC) 08/19/2023     Past Surgical History:   Procedure Laterality Date    AMPUTATION Left     left foot resection 10 years ago dr tran    HEMODIALYSIS ADULT  1/18/2025    HEMODIALYSIS ADULT  2/27/2025    IR EMBOLIZATION (SPECIFY VESSEL OR SITE)  4/3/2025    IR EMBOLIZATION (SPECIFY VESSEL OR SITE)  4/7/2025    IR LOWER EXTREMITY ANGIOGRAM  08/29/2023    IR TEMPORARY DIALYSIS CATHETER PLACEMENT  08/25/2023    IR TUNNELED CENTRAL LINE REMOVAL  08/23/2023    IR TUNNELED DIALYSIS CATHETER PLACEMENT  01/27/2022    IR TUNNELED DIALYSIS CATHETER PLACEMENT  08/30/2023    WA AMPUTATION METATARSAL W/TOE SINGLE Right 8/31/2023    Procedure: RIGHT PARTIAL 1ST RAY RESECTION WITH  WOUND VAC APPLICATION;  Surgeon: Won Parmar DPM;  Location: [facility];  Service: Podiatry     Social History     Tobacco Use    Smoking status: Never     Passive exposure: Never    Smokeless tobacco: Never   Vaping Use    Vaping status: Never Used   Substance and Sexual Activity    Alcohol use: Not Currently     Alcohol/week: 0.0 standard drinks of alcohol     Comment: 0    Drug use: Never    Sexual activity: Not on file     E-Cigarette/Vaping    E-Cigarette Use Never User      E-Cigarette/Vaping Substances    Nicotine No     THC No     CBD No     Flavoring No     Other No     Unknown No      Family history non-contributory  Social History:  Marital  Status:        Meds/Allergies   I have reviewed home medications with patient personally.  Prior to Admission medications    Medication Sig Start Date End Date Taking? Authorizing Provider   acetaminophen (TYLENOL) 325 mg tablet Take 2 tablets (650 mg total) by mouth every 6 (six) hours as needed for mild pain 5/6/25   GRANT Dawson   allopurinol (ZYLOPRIM) 100 mg tablet Take 1 tablet (100 mg total) by mouth daily 5/6/25 1/7/26  GRANT Dawson   apixaban (Eliquis) 5 mg Take 1 tablet (5 mg total) by mouth 2 (two) times a day 5/6/25   GRANT Dawson   atorvastatin (LIPITOR) 80 mg tablet Take 1 tablet (80 mg total) by mouth daily 5/6/25   GRANT Dawson   gabapentin (NEURONTIN) 100 mg capsule Take 1 capsule (100 mg total) by mouth 3 (three) times a week 4/1/25   Nallely Pisano PA-C   melatonin 3 mg Take 2 tablets (6 mg total) by mouth daily at bedtime 5/7/25   Santos Guan DO   methocarbamol (ROBAXIN) 500 mg tablet Take 1 tablet (500 mg total) by mouth 3 (three) times a day as needed for muscle spasms 5/7/25   Santos Guan DO   NIFEdipine (PROCARDIA XL) 30 mg 24 hr tablet Take 1 tablet (30 mg total) by mouth daily after dinner 5/6/25   GRANT Dawson   oxyCODONE (ROXICODONE) 5 immediate release tablet Take 1 tablet (5 mg total) by mouth 3 (three) times a day as needed for severe pain for up to 10 days Only take 0.5 tablet (2.5mg total) for moderate pain Max Daily Amount: 15 mg 5/7/25 5/17/25  Santos Guan DO   polyethylene glycol (MIRALAX) 17 g packet Take 17 g by mouth daily as needed (constipation) 5/7/25   Santos Guan DO   senna (SENOKOT) 8.6 mg Take 1 tablet (8.6 mg total) by mouth daily with lunch 5/7/25   Santos Guan DO   sevelamer (RENAGEL) 800 mg tablet Take 2 tablets (1,600 mg total) by mouth 3 (three) times a day with meals 5/6/25   GRANT Dawson     No Known Allergies    Objective :  Temp:  [97.6 °F (36.4  °C)-98 °F (36.7 °C)] 98 °F (36.7 °C)  HR:  [60-79] 72  BP: (122-183)/(58-76) 145/61  Resp:  [16-20] 16  SpO2:  [98 %-100 %] 98 %  O2 Device: None (Room air)    Physical Exam  Cardiovascular:      Rate and Rhythm: Normal rate and regular rhythm.   Pulmonary:      Effort: Pulmonary effort is normal.      Breath sounds: Normal breath sounds.   Abdominal:      General: Bowel sounds are normal.      Palpations: Abdomen is soft.   Musculoskeletal:         General: Tenderness (low back) present.      Right lower leg: Edema present.      Left lower leg: Edema present.   Skin:     Comments: chronic lower extremity wounds noted   Neurological:      Mental Status: He is alert.      Motor: No weakness.   Psychiatric:         Mood and Affect: Mood normal.          Lines/Drains:  Lines/Drains/Airways       Active Status       Name Placement date Placement time Site Days    HD Permanent Double Catheter 08/30/23  --  Internal jugular  617                          Lab Results: I have reviewed the following results:  Results from last 7 days   Lab Units 05/08/25  0749   WBC Thousand/uL 8.16   HEMOGLOBIN g/dL 9.4*   HEMATOCRIT % 30.2*   PLATELETS Thousands/uL 231   SEGS PCT % 74   LYMPHO PCT % 13*   MONO PCT % 9   EOS PCT % 3     Results from last 7 days   Lab Units 05/08/25  0948 05/05/25  0532 05/04/25  0945   SODIUM mmol/L 131*   < > 129*   POTASSIUM mmol/L 3.7   < > 4.2   CHLORIDE mmol/L 98   < > 95*   CO2 mmol/L 23   < > 22   BUN mg/dL 25   < > 24   CREATININE mg/dL 4.90*   < > 4.70*   ANION GAP mmol/L 10   < > 12   CALCIUM mg/dL 8.0*   < > 9.3   ALBUMIN g/dL  --   --  4.2   TOTAL BILIRUBIN mg/dL  --   --  0.65   ALK PHOS U/L  --   --  118*   ALT U/L  --   --  7   AST U/L  --   --  14   GLUCOSE RANDOM mg/dL 92   < > 82    < > = values in this interval not displayed.             Lab Results   Component Value Date    HGBA1C 5.7 (H) 02/09/2025    HGBA1C 5.5 10/08/2024    HGBA1C 6.2 (H) 07/02/2024           Imaging Results Review: I  reviewed radiology reports from this admission including: CT lumbar spine.  Other Study Results Review: No additional pertinent studies reviewed.    Administrative Statements   I have spent a total time of 75 minutes in caring for this patient on the day of the visit/encounter including Diagnostic results, Impressions, Counseling / Coordination of care, Documenting in the medical record, Reviewing/placing orders in the medical record (including tests, medications, and/or procedures), Obtaining or reviewing history  , and Communicating with other healthcare professionals .    ** Please Note: This note has been constructed using a voice recognition system. **

## 2025-05-08 NOTE — ASSESSMENT & PLAN NOTE
Seen by ophthalmology on 4/12 at [facility] due to floaters and blurry vision  Proliferative diabetic retinopathy with vitreous hemorrhage OU.    Ophthalmology recommended OP follow-up immediately after discharge for ongoing management and referral to a retina specialist.  CTH with Increased density within the right globe is unchanged from the examination performed 5/3/2025 but new compared to a prior MRI dated 10/9/2024. Findings may be related to retinal detachment.   Discussed with ophthalmology Dr. Chaudhry who recommended ambulatory referral to the [facility] eye clinic. [phone] for retina specialist.

## 2025-05-08 NOTE — ED PROVIDER NOTES
Time reflects when diagnosis was documented in both MDM as applicable and the Disposition within this note       Time User Action Codes Description Comment    5/8/2025  7:48 AM Devante Shah Add [N18.6] ESRD (end stage renal disease) (Formerly [facility])     5/8/2025  7:49 AM Alex So Add [W19.XXXA] Fall, initial encounter     5/8/2025  7:49 AM Alex So Modify [N18.6] ESRD (end stage renal disease) (Formerly [facility])     5/8/2025  7:49 AM Alex So Modify [W19.XXXA] Fall, initial encounter     5/8/2025  7:49 AM Alex So Add [R26.2] Ambulatory dysfunction           ED Disposition       ED Disposition   Admit    Condition   Stable    Date/Time   Thu May 8, 2025  7:49 AM    Comment   Case was discussed with Dr. Shah and the patient's admission status was agreed to be Admission Status: observation status to the service of Dr. Shah .               Assessment & Plan       Medical Decision Making  64-year-old male presenting to ED today after a fall.  At this time complaining of pain in the lower back.  Will do a CT L-spine to evaluate for fracture.  Will also get a CT C-spine and CT head without contrast to evaluate.    CAT scans were otherwise unremarkable.  Patient does have this possible retinal detachment however he was seen by ophthalmology during rehab and they are planning to see him as an outpatient.    Will have ER tech ambulate patient with walker which is his baseline.  If he is able to will discharge home otherwise will need admission.    Patient failed ambulation trial.  Will admit to medicine for continued pain management under observation.    Amount and/or Complexity of Data Reviewed  Labs: ordered.  Radiology: ordered.    Risk  OTC drugs.  Prescription drug management.  Decision regarding hospitalization.             Medications   oxyCODONE (ROXICODONE) IR tablet 5 mg (5 mg Oral Given 5/8/25 0610)   acetaminophen (TYLENOL) tablet 975 mg (975 mg Oral Given 5/8/25 0722)       ED Risk Strat Scores                     No data recorded        SBIRT 22yo+      Flowsheet Row Most Recent Value   Initial Alcohol Screen: US AUDIT-C     1. How often do you have a drink containing alcohol? 0 Filed at: 05/08/2025 0601   2. How many drinks containing alcohol do you have on a typical day you are drinking?  0 Filed at: 05/08/2025 0601   3a. Male UNDER 65: How often do you have five or more drinks on one occasion? 0 Filed at: 05/08/2025 0601   3b. FEMALE Any Age, or MALE 65+: How often do you have 4 or more drinks on one occassion? 0 Filed at: 05/08/2025 0601   Audit-C Score 0 Filed at: 05/08/2025 0601   NICOL[place]S: How many times in the past year have you...    Used an illegal drug or used a prescription medication for non-medical reasons? Never Filed at: 05/08/2025 0601                            History of Present Illness       Chief Complaint   Patient presents with    Fall     Pt arrived with BLS stating he was getting ready for dialysis and fell on his glutaeus umesh over his walker.  Pt is Aox4 no LOC no head strike pain is in his lower back and sacral area.         Past Medical History:   Diagnosis Date    Acute cystitis 10/18/2024    Acute on chronic anemia 01/23/2022    Atrial fibrillation (HCC)     Bilateral sacral fracture, closed (HCC) 07/03/2024    Bradycardia 09/05/2023    Diabetes mellitus (HCC)     ESRD (end stage renal disease) on dialysis (HCC)     Hematuria 10/10/2024    Hematuria 10/10/2024    Hyperkalemia 04/06/2024    Hyperkalemia 04/06/2024    Hyperlipidemia     Hypertension     Left toe amputee (HCC)     Subacute osteomyelitis of right foot (HCC)     TIA (transient ischemic attack)     Toxic metabolic encephalopathy 10/08/2024    Traumatic rhabdomyolysis (HCC) 08/19/2023      Past Surgical History:   Procedure Laterality Date    AMPUTATION Left     left foot resection 10 years ago dr tran    HEMODIALYSIS ADULT  1/18/2025    HEMODIALYSIS ADULT  2/27/2025    IR EMBOLIZATION (SPECIFY VESSEL OR SITE)   4/3/2025    IR EMBOLIZATION (SPECIFY VESSEL OR SITE)  4/7/2025    IR LOWER EXTREMITY ANGIOGRAM  08/29/2023    IR TEMPORARY DIALYSIS CATHETER PLACEMENT  08/25/2023    IR TUNNELED CENTRAL LINE REMOVAL  08/23/2023    IR TUNNELED DIALYSIS CATHETER PLACEMENT  01/27/2022    IR TUNNELED DIALYSIS CATHETER PLACEMENT  08/30/2023    CT AMPUTATION METATARSAL W/TOE SINGLE Right 8/31/2023    Procedure: RIGHT PARTIAL 1ST RAY RESECTION WITH  WOUND VAC APPLICATION;  Surgeon: Won Parmar DPM;  Location: [facility];  Service: Podiatry      History reviewed. No pertinent family history.   Social History     Tobacco Use    Smoking status: Never     Passive exposure: Never    Smokeless tobacco: Never   Vaping Use    Vaping status: Never Used   Substance Use Topics    Alcohol use: Not Currently     Alcohol/week: 0.0 standard drinks of alcohol     Comment: 0    Drug use: Never      E-Cigarette/Vaping    E-Cigarette Use Never User       E-Cigarette/Vaping Substances    Nicotine No     THC No     CBD No     Flavoring No     Other No     Unknown No       I have reviewed and agree with the history as documented.     64-year-old male past medical history significant for lumbar spine fracture as well as extended stay in a rehab center after RP hemorrhage presenting to the ED today for low back pain.  Patient states that he was ambulating with his walker this morning when he fell as he tripped over his walker.  Denies any head strike.  Landed on his back.  Complaining of L-spine pain.  No fevers or chills.  No nausea or vomiting.        Review of Systems   Constitutional:  Negative for chills and fever.   HENT:  Negative for hearing loss.    Eyes:  Negative for visual disturbance.   Respiratory:  Negative for shortness of breath.    Cardiovascular:  Negative for chest pain.   Gastrointestinal:  Negative for abdominal pain, constipation, diarrhea, nausea and vomiting.   Genitourinary:  Negative for difficulty urinating.   Musculoskeletal:   Negative for myalgias.   Skin:  Negative for rash.   Neurological:  Negative for dizziness.   Psychiatric/Behavioral:  Negative for agitation.    All other systems reviewed and are negative.          Objective       ED Triage Vitals   Temperature Pulse Blood Pressure Respirations SpO2 Patient Position - Orthostatic VS   05/08/25 0600 05/08/25 0600 05/08/25 0600 05/08/25 0600 05/08/25 0600 05/08/25 0600   97.6 °F (36.4 °C) 79 (!) 183/76 18 100 % Sitting      Temp Source Heart Rate Source BP Location FiO2 (%) Pain Score    05/08/25 0600 05/08/25 0600 05/08/25 0600 -- 05/08/25 0610    Oral Monitor Left arm  10 - Worst Possible Pain      Vitals      Date and Time Temp Pulse SpO2 Resp BP Pain Score FACES Pain Rating User   05/08/25 0730 -- 77 98 % 18 164/70 -- -- CG   05/08/25 0722 -- -- -- -- -- 9 -- NM   05/08/25 0714 -- 74 100 % 18 154/66 9 -- NM   05/08/25 0610 -- -- -- -- -- 10 - Worst Possible Pain -- SH   05/08/25 0600 97.6 °F (36.4 °C) 79 100 % 18 183/76 -- -- DD            Physical Exam  Vitals and nursing note reviewed.   Constitutional:       General: He is not in acute distress.     Appearance: Normal appearance. He is not ill-appearing.   HENT:      Head: Normocephalic and atraumatic.      Right Ear: External ear normal.      Left Ear: External ear normal.      Nose: Nose normal. No congestion.      Mouth/Throat:      Mouth: Mucous membranes are moist.      Pharynx: Oropharynx is clear. No oropharyngeal exudate.   Eyes:      General:         Right eye: No discharge.         Left eye: No discharge.      Extraocular Movements: Extraocular movements intact.      Conjunctiva/sclera: Conjunctivae normal.      Pupils: Pupils are equal, round, and reactive to light.   Cardiovascular:      Rate and Rhythm: Normal rate and regular rhythm.      Pulses: Normal pulses.      Heart sounds: Normal heart sounds.   Pulmonary:      Effort: Pulmonary effort is normal. No respiratory distress.      Breath sounds: Normal breath  "sounds. No wheezing.   Abdominal:      General: Abdomen is flat. Bowel sounds are normal. There is no distension.      Palpations: Abdomen is soft.      Tenderness: There is no abdominal tenderness.   Musculoskeletal:         General: No swelling or deformity. Normal range of motion.      Cervical back: Normal range of motion. No rigidity.      Comments: No C, T, tenderness.  L-spine tender.   Skin:     General: Skin is warm and dry.      Capillary Refill: Capillary refill takes less than 2 seconds.   Neurological:      General: No focal deficit present.      Mental Status: He is alert and oriented to person, place, and time. Mental status is at baseline.      Cranial Nerves: No cranial nerve deficit.      Motor: No weakness.      Gait: Gait normal.   Psychiatric:         Mood and Affect: Mood normal.         Behavior: Behavior normal.         Results Reviewed       Procedure Component Value Units Date/Time    CBC and differential [[id number]] Collected: 05/08/25 0749    Lab Status: No result Specimen: Blood from Arm, Left     Basic metabolic panel [[id number]] Collected: 05/08/25 0749    Lab Status: No result Specimen: Blood from Arm, Left     Magnesium [[id number]] Collected: 05/08/25 0749    Lab Status: No result Specimen: Blood from Arm, Left     Phosphorus [[id number]] Collected: 05/08/25 0749    Lab Status: No result Specimen: Blood from Arm, Left             CT head without contrast   Final Interpretation by Haroon Bell DO (05/08 0717)      No acute intracranial abnormality.      Increased density within the right globe is unchanged from the examination performed 5/3/2025 but new compared to a prior MRI dated 10/9/2024. Findings may be related to retinal detachment. Recommend ophthalmology evaluation and consider dedicated MRI    imaging of the orbits.      This examination was marked \"immediate notification\" in Epic in order to begin the standard process by which the radiology reading room liaison " alerts the referring practitioner.                  Workstation performed: [id number]         CT spine cervical without contrast   Final Interpretation by Haroon Bell DO (05/08 0721)      No cervical spine fracture or traumatic malalignment.                  Workstation performed: [id number]         CT spine lumbar without contrast   Final Interpretation by Haroon Bell DO (05/08 0728)      Stable obliquely oriented fracture extending through the L2 vertebral body without bony retropulsion or extension into the posterior elements. Stable alignment. No new fracture.      Stable mild lumbar degenerative change.      Workstation performed: [id number]             Procedures    ED Medication and Procedure Management   Prior to Admission Medications   Prescriptions Last Dose Informant Patient Reported? Taking?   NIFEdipine (PROCARDIA XL) 30 mg 24 hr tablet   No No   Sig: Take 1 tablet (30 mg total) by mouth daily after dinner   acetaminophen (TYLENOL) 325 mg tablet   No No   Sig: Take 2 tablets (650 mg total) by mouth every 6 (six) hours as needed for mild pain   allopurinol (ZYLOPRIM) 100 mg tablet   No No   Sig: Take 1 tablet (100 mg total) by mouth daily   apixaban (Eliquis) 5 mg   No No   Sig: Take 1 tablet (5 mg total) by mouth 2 (two) times a day   atorvastatin (LIPITOR) 80 mg tablet   No No   Sig: Take 1 tablet (80 mg total) by mouth daily   gabapentin (NEURONTIN) 100 mg capsule   No No   Sig: Take 1 capsule (100 mg total) by mouth 3 (three) times a week   melatonin 3 mg   No No   Sig: Take 2 tablets (6 mg total) by mouth daily at bedtime   methocarbamol (ROBAXIN) 500 mg tablet   No No   Sig: Take 1 tablet (500 mg total) by mouth 3 (three) times a day as needed for muscle spasms   oxyCODONE (ROXICODONE) 5 immediate release tablet   No No   Sig: Take 1 tablet (5 mg total) by mouth 3 (three) times a day as needed for severe pain for up to 10 days Only take 0.5 tablet (2.5mg total) for moderate  pain Max Daily Amount: 15 mg   polyethylene glycol (MIRALAX) 17 g packet   No No   Sig: Take 17 g by mouth daily as needed (constipation)   senna (SENOKOT) 8.6 mg   No No   Sig: Take 1 tablet (8.6 mg total) by mouth daily with lunch   sevelamer (RENAGEL) 800 mg tablet   No No   Sig: Take 2 tablets (1,600 mg total) by mouth 3 (three) times a day with meals      Facility-Administered Medications: None     Patient's Medications   Discharge Prescriptions    No medications on file     No discharge procedures on file.  ED SEPSIS DOCUMENTATION   Time reflects when diagnosis was documented in both MDM as applicable and the Disposition within this note       Time User Action Codes Description Comment    5/8/2025  7:48 AM Devante Shah Add [N18.6] ESRD (end stage renal disease) (Formerly [facility])     5/8/2025  7:49 AM Alex So Add [W19.XXXA] Fall, initial encounter     5/8/2025  7:49 AM Alex So Modify [N18.6] ESRD (end stage renal disease) (Formerly [facility])     5/8/2025  7:49 AM Alex So Modify [W19.XXXA] Fall, initial encounter     5/8/2025  7:49 AM Alex So Add [R26.2] Ambulatory dysfunction                  Alex So MD  05/08/25 6101

## 2025-05-08 NOTE — PHYSICAL THERAPY NOTE
"       PHYSICAL THERAPY EVALUATION/TREATMENT     05/08/25 1401   PT Last Visit   PT Visit Date 05/08/25   Note Type   Note type Evaluation   Pain Assessment   Pain Assessment Tool 0-10   Pain Score 6   Pain Location/Orientation Location: Back   Restrictions/Precautions   Braces or Orthoses (S)  LSO  (Pt's sister to bring in pt's LSO;  LSO to be worn when HOB >45 degrees or pt is up OOB.)   Other Precautions Chair Alarm;Bed Alarm;Contact/isolation;Pain;Fall Risk;Visual impairment  (HD)   Home Living   Type of Home House   Home Layout One level  (7 NAZARIO with a rail)   Bathroom Equipment Commode   Home Equipment Walker   Prior Function   Level of [place]   (ambulatory with a rolling walker)   Lives With   (with his sister)   Receives Help From Family   Falls in the last 6 months 1 to 4   General   Additional Pertinent History Pt was just DC home from acute rehab x 1 day before sustaining a fall.  Pt has a subacute L2 transverse process fx and has an LSO to be worn when OOB.  Pt's sister phoned and reports she will bring the brace to the hospital.   Cognition   Overall Cognitive Status WFL   Attention Attends with cues to redirect   Orientation Level Oriented X4   Subjective   Subjective \"I just want to go home'   RLE Assessment   RLE Assessment   (ROM WFL, MMT 3+/5)   LLE Assessment   LLE Assessment   (ROM WFL, MMT 3+/5)   Bed Mobility   Supine to Sit 3  Moderate assistance   Sit to Supine 4  Minimal assistance   Transfers   Sit to Stand 4  Minimal assistance   Additional items Assist x 1;Assist x 2   Stand to Sit 4  Minimal assistance   Additional items Assist x 1;Assist x 2   Stand pivot 4  Minimal assistance   Additional items Assist x 1;Assist x 2  (with walker  to commode)   Additional Comments Pt asked to use the commode instead of the bedpan. Pt does not have his LSO and aware that he is supposed to be wearing it but still wanted to use the commode.   Balance   Static Sitting Fair   Static Standing Fair - "   Activity Tolerance   Activity Tolerance Patient limited by pain;Patient limited by fatigue   Assessment   Prognosis Good   Problem List Decreased strength;Decreased range of motion;Impaired balance;Decreased mobility;Pain   Assessment Pt is 64 y.o. male seen for PT evaluation s/p admit to [facility] on 5/8/2025 w/ Ambulatory dysfunction. PT consulted to assess pt's functional mobility and d/c needs. Order placed for PT eval and tx.  Co-morbidities affecting patient's physical performance include: ESRD on HD, CHF, htn, DM, polyneuropathy, charcot joint, subacute L2 fx.  Personal factors affecting patient at time of initial evaluation include: ambulating with assistive device, stairs to enter home, inability to ambulate household distances, inability to perform dynamic tasks in community, limited home support, positive fall history, and inability to live alone.         Prior to admission, patient was just released from [facility], pt was ambulatory with a walker and LSO.  Upon evaluation: Pt was able to transfers           Please find objective findings from Physical Therapy assessment regarding body systems outlined above with impairments and limitations including weakness, impaired balance, gait deviations, pain, decreased activity tolerance, decreased functional mobility tolerance, fall risk, and orthopedic restrictions.The Barthel Index was used as a functional outcome tool presenting with a score of Barthel Index Score: 55 today indicating marked limitations of functional mobility and ADLS.  Patient's clinical presentation is currently unstable/unpredictable as seen in patient's presentation of changing level of pain, increased fall risk, new onset of impairment of functional mobility, and new onset of weakness. Pt would benefit from continued Physical Therapy treatment to address deficits as defined above and maximize level of functional mobility.     As demonstrated by objective findings, the assigned level  "of complexity for this evaluation is high.    The patient's [facility] Basic Mobility Inpatient Short Form Raw Score is 15. A Raw score of greater than 16 suggests the patient may benefit from discharge to post-acute rehabilitation services. Please also refer to the recommendation of the Physical Therapist for safe discharge planning.   Goals   Patient Goals \"go home\"   [facility] Expiration Date 05/15/25   Short Term Goal #1 1. independent bed mobility,   2.independent transfers, 3. supervision ambulation with a walker 50 feet indoor surfaces   LT Expiration Date 05/22/25   Long Term Goal #1 1. no falls,   2. supervision up and down 7 steps to pt can get in and out of his home,   3. improve standing static balance to at least fair+ to decrease fall risk,   4. pt will ambulate 50 feet with a walker outdoor level surfaces so pt can get to his medical appointments.   Plan   Treatment/Interventions Bed mobility;Gait training;Equipment eval/education;Patient/family training;Therapeutic exercise;Elevations;LE strengthening/ROM;Functional transfer training;ADL retraining   PT Frequency 3-5x/wk   Discharge Recommendation   Rehab Resource Intensity Level, PT II (Moderate Resource Intensity)   AM-PAC Basic Mobility Inpatient   Turning in Flat Bed Without Bedrails 3   Lying on Back to Sitting on Edge of Flat Bed Without Bedrails 2   Moving Bed to Chair 3   Standing Up From Chair Using Arms 3   Walk in Room 2   Climb 3-5 Stairs With Railing 2   Basic Mobility Inpatient Raw Score 15   Basic Mobility Standardized Score 36.97   [facility] Highest Level Of Mobility   [facility] Goal 4: Move to chair/commode   -[facility] Achieved 6: Walk 10 steps or more   Barthel Index   Feeding 10   Bathing 0   Grooming Score 0   Dressing Score 5   Bladder Score 10   Bowels Score 10   Toilet Use Score 5   Transfers (Bed/Chair) Score 10   Mobility (Level Surface) Score 0   Stairs Score 5   Barthel Index Score 55   Additional Treatment Session   Start Time 1350 " "  End Time 1400   Treatment Assessment S:  \"I really want to walk\"    O: Min assist to rise from sitting and ambulate 15 feet with a rolling walker.Pt is aware that he is supposed to wear the LSO but does not have it with him.    A:  Pt is generally weak and unsteady.   P: Pt to wear his LSO for OOB mobility in the future. Pt's sister to bring the brace in today along with pt's shoes.   End of Consult   Patient Position at End of Consult All needs within reach;Supine;Bed/Chair alarm activated   Licensure   [place] License Number  Niharika Samra PT [id number]     "

## 2025-05-08 NOTE — PROCEDURES
HEMODIALYSIS PROCEDURE NOTE  The patient was seen and examined on hemodialysis.  He was tolerating hemodialysis without any complaints.  Time: 4 hours  Sodium: 138 Blood flow: 400   Dialyzer: F160 Potassium: 2 Dialysate flow: 800   Access: R TDC Bicarbonate: 38 Ultrafiltration goal: 2 L   Medications on HD: Epogen to be given

## 2025-05-08 NOTE — ASSESSMENT & PLAN NOTE
TTS at [facility]  Access: Right chest wall permacath  Last HD: 05/06 at [facility] today

## 2025-05-08 NOTE — CONSULTS
NEPHROLOGY HOSPITAL CONSULTATION   Naresh Carpio 64 y.o. male MRN: [id number]  Unit/Bed#: ED 10 Encounter: [id number]    Brief History of Admission -   Naresh Carpio is a 64 y.o. male who was admitted to [facility] after presenting with fall. A renal consultation is requested today for assistance in the management of ESRD on HD.   Assessment & Plan  ESRD (end stage renal disease) ([facility])  TTS at [facility]  Access: Right chest wall permacath  Last HD: 05/06 at [facility] today  Anemia due to chronic kidney disease, on chronic dialysis ([facility])  Hemoglobin 9.4, close to goal  We will start IV Epogen with HD  Monitor hemoglobin send history of retroperitoneal hematoma status post IR embolization on recent hospitalization in 04/2025  Hypertension  Continue nifedipine  Monitor blood pressure  Diabetes mellitus type 2 with peripheral artery disease ([facility])  Management per primary team  Chronic diastolic (congestive) heart failure ([facility])  UF on HD  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    HISTORY OF PRESENT ILLNESS:  Requesting Physician: Devante Shah MD  Reason for Consult: ESRD on HD    Naresh Carpio is a 64 y.o. male who was admitted to [facility] after presenting with fall. A renal consultation is requested today for assistance in the management of ESRD on HD.  He has history of ESRD on dialysis, [facility] after developing RP hemorrhage as well as left spine fracture.  Came  to the hospital complaining of left spine tenderness.  He had a fall while trying to ambulate with his walker at home.  Repeat CT shows left spine with stable fracture.  CT head and CT spine also unremarkable.    PAST MEDICAL HISTORY:  Past Medical History:   Diagnosis Date    Acute cystitis 10/18/2024    Acute on chronic anemia 01/23/2022    Atrial fibrillation (HCC)     Bilateral sacral fracture, closed (HCC) 07/03/2024    Bradycardia 09/05/2023    Diabetes mellitus (HCC)     ESRD (end stage renal disease) on dialysis (HCC)     Hematuria 10/10/2024    Hematuria 10/10/2024    Hyperkalemia 04/06/2024    Hyperkalemia 04/06/2024    Hyperlipidemia     Hypertension     Left toe amputee (HCC)     Subacute osteomyelitis of right foot (HCC)     TIA (transient ischemic attack)     Toxic metabolic encephalopathy 10/08/2024    Traumatic rhabdomyolysis ([facility]) 08/19/2023       PAST SURGICAL HISTORY:  Past Surgical History:   Procedure Laterality Date    AMPUTATION Left     left foot resection 10 years ago dr tran    HEMODIALYSIS ADULT  1/18/2025    HEMODIALYSIS ADULT  2/27/2025    IR EMBOLIZATION (SPECIFY VESSEL OR SITE)  4/3/2025    IR EMBOLIZATION (SPECIFY VESSEL OR SITE)  4/7/2025    IR LOWER EXTREMITY ANGIOGRAM  08/29/2023    IR TEMPORARY DIALYSIS CATHETER PLACEMENT  08/25/2023    IR TUNNELED CENTRAL LINE REMOVAL  08/23/2023    IR TUNNELED DIALYSIS CATHETER PLACEMENT  01/27/2022    IR TUNNELED DIALYSIS CATHETER PLACEMENT  08/30/2023    NC AMPUTATION METATARSAL W/TOE SINGLE Right 8/31/2023    Procedure: RIGHT PARTIAL 1ST RAY RESECTION WITH  WOUND VAC APPLICATION;  Surgeon: Won Parmar DPM;  Location: [facility];  Service: Podiatry       ALLERGIES:  No Known Allergies    SOCIAL HISTORY:  Social History     Substance and Sexual Activity   Alcohol Use Not Currently    Alcohol/week: 0.0 standard drinks of alcohol    Comment: 0     Social History     Substance and Sexual Activity   Drug Use Never     Social History      Tobacco Use   Smoking Status Never    Passive exposure: Never   Smokeless Tobacco Never       FAMILY HISTORY:  History reviewed. No pertinent family history.    MEDICATIONS:  No current facility-administered medications for this encounter.    Current Outpatient Medications:     acetaminophen (TYLENOL) 325 mg tablet, Take 2 tablets (650 mg total) by mouth every 6 (six) hours as needed for mild pain, Disp: , Rfl:     allopurinol (ZYLOPRIM) 100 mg tablet, Take 1 tablet (100 mg total) by mouth daily, Disp: , Rfl:     apixaban (Eliquis) 5 mg, Take 1 tablet (5 mg total) by mouth 2 (two) times a day, Disp: 60 tablet, Rfl: 0    atorvastatin (LIPITOR) 80 mg tablet, Take 1 tablet (80 mg total) by mouth daily, Disp: , Rfl:     gabapentin (NEURONTIN) 100 mg capsule, Take 1 capsule (100 mg total) by mouth 3 (three) times a week, Disp: , Rfl:     melatonin 3 mg, Take 2 tablets (6 mg total) by mouth daily at bedtime, Disp: 30 tablet, Rfl: 0    methocarbamol (ROBAXIN) 500 mg tablet, Take 1 tablet (500 mg total) by mouth 3 (three) times a day as needed for muscle spasms, Disp: 90 tablet, Rfl: 0    NIFEdipine (PROCARDIA XL) 30 mg 24 hr tablet, Take 1 tablet (30 mg total) by mouth daily after dinner, Disp: 30 tablet, Rfl: 0    oxyCODONE (ROXICODONE) 5 immediate release tablet, Take 1 tablet (5 mg total) by mouth 3 (three) times a day as needed for severe pain for up to 10 days Only take 0.5 tablet (2.5mg total) for moderate pain Max Daily Amount: 15 mg, Disp: 21 tablet, Rfl: 0    polyethylene glycol (MIRALAX) 17 g packet, Take 17 g by mouth daily as needed (constipation), Disp: , Rfl:     senna (SENOKOT) 8.6 mg, Take 1 tablet (8.6 mg total) by mouth daily with lunch, Disp: 30 tablet, Rfl: 0    sevelamer (RENAGEL) 800 mg tablet, Take 2 tablets (1,600 mg total) by mouth 3 (three) times a day with meals, Disp: 180 tablet, Rfl: 0    Review of Systems   Constitutional:  Negative for chills and fever.   HENT:  Negative for ear pain and  sore throat.    Eyes:  Negative for pain and visual disturbance.   Respiratory:  Negative for cough and shortness of breath.    Cardiovascular:  Negative for chest pain and palpitations.   Gastrointestinal:  Negative for abdominal pain and vomiting.   Genitourinary:  Negative for dysuria and hematuria.   Musculoskeletal:  Negative for arthralgias and back pain.   Skin:  Negative for color change and rash.   Neurological:  Positive for weakness. Negative for seizures and syncope.   All other systems reviewed and are negative.    PHYSICAL EXAM:  Current Weight:    First Weight:    Vitals:    05/08/25 0600 05/08/25 0714 05/08/25 0730   BP: (!) 183/76 154/66 164/70   BP Location: Left arm Left arm Left arm   Pulse: 79 74 77   Resp: 18 18 18   Temp: 97.6 °F (36.4 °C)     TempSrc: Oral     SpO2: 100% 100% 98%     No intake or output data in the 24 hours ending 05/08/25 0800  Physical Exam  Constitutional:       Appearance: Normal appearance.   HENT:      Head: Normocephalic and atraumatic.   Cardiovascular:      Rate and Rhythm: Normal rate and regular rhythm.      Comments: Right chest wall permacath present  Pulmonary:      Effort: Pulmonary effort is normal.      Breath sounds: Normal breath sounds.   Musculoskeletal:         General: Normal range of motion.      Right lower leg: Edema present.      Left lower leg: Edema present.   Skin:     General: Skin is warm.   Neurological:      Mental Status: He is alert and oriented to person, place, and time. Mental status is at baseline.   Psychiatric:         Mood and Affect: Mood normal.       Lab Results:   Results from last 7 days   Lab Units 05/08/25  0749 05/05/25  0532 05/04/25  0945   WBC Thousand/uL 8.16  --  5.38   HEMOGLOBIN g/dL 9.4*  --  9.7*   HEMATOCRIT % 30.2*  --  32.2*   PLATELETS Thousands/uL 231  --  214   POTASSIUM mmol/L  --  4.1 4.2   CHLORIDE mmol/L  --  95* 95*   CO2 mmol/L  --  24 22   BUN mg/dL  --  31* 24   CREATININE mg/dL  --  6.09* 4.70*  "  CALCIUM mg/dL  --  9.0 9.3   ALK PHOS U/L  --   --  118*   ALT U/L  --   --  7   AST U/L  --   --  14     Portions of the record may have been created with voice recognition software. Occasional wrong word or \"sound a like\" substitutions may have occurred due to the inherent limitations of voice recognition software. Read the chart carefully and recognize, using context, where substitutions have occurred.If you have any questions, please contact the dictating provider.    "

## 2025-05-08 NOTE — ASSESSMENT & PLAN NOTE
Hemoglobin 9.4, close to goal  We will start IV Epogen with HD  Monitor hemoglobin send history of retroperitoneal hematoma status post IR embolization on recent hospitalization in 04/2025

## 2025-05-09 LAB
ANION GAP SERPL CALCULATED.3IONS-SCNC: 12 MMOL/L (ref 4–13)
BUN SERPL-MCNC: 19 MG/DL (ref 5–25)
CALCIUM SERPL-MCNC: 8.3 MG/DL (ref 8.4–10.2)
CHLORIDE SERPL-SCNC: 96 MMOL/L (ref 96–108)
CO2 SERPL-SCNC: 22 MMOL/L (ref 21–32)
CREAT SERPL-MCNC: 4.34 MG/DL (ref 0.6–1.3)
DME PARACHUTE DELIVERY DATE ACTUAL: NORMAL
DME PARACHUTE DELIVERY DATE REQUESTED: NORMAL
DME PARACHUTE ITEM DESCRIPTION: NORMAL
DME PARACHUTE ORDER STATUS: NORMAL
DME PARACHUTE SUPPLIER NAME: NORMAL
DME PARACHUTE SUPPLIER PHONE: NORMAL
ERYTHROCYTE [DISTWIDTH] IN BLOOD BY AUTOMATED COUNT: 18.6 % (ref 11.6–15.1)
GFR SERPL CREATININE-BSD FRML MDRD: 13 ML/MIN/1.73SQ M
GLUCOSE P FAST SERPL-MCNC: 89 MG/DL (ref 65–99)
GLUCOSE SERPL-MCNC: 89 MG/DL (ref 65–140)
HCT VFR BLD AUTO: 27.3 % (ref 36.5–49.3)
HGB BLD-MCNC: 8.6 G/DL (ref 12–17)
MCH RBC QN AUTO: 30.2 PG (ref 26.8–34.3)
MCHC RBC AUTO-ENTMCNC: 31.5 G/DL (ref 31.4–37.4)
MCV RBC AUTO: 96 FL (ref 82–98)
PLATELET # BLD AUTO: 202 THOUSANDS/UL (ref 149–390)
PMV BLD AUTO: 8.8 FL (ref 8.9–12.7)
POTASSIUM SERPL-SCNC: 3.7 MMOL/L (ref 3.5–5.3)
RBC # BLD AUTO: 2.85 MILLION/UL (ref 3.88–5.62)
SODIUM SERPL-SCNC: 130 MMOL/L (ref 135–147)
WBC # BLD AUTO: 5.42 THOUSAND/UL (ref 4.31–10.16)

## 2025-05-09 PROCEDURE — 97530 THERAPEUTIC ACTIVITIES: CPT

## 2025-05-09 PROCEDURE — 85027 COMPLETE CBC AUTOMATED: CPT | Performed by: INTERNAL MEDICINE

## 2025-05-09 PROCEDURE — 99232 SBSQ HOSP IP/OBS MODERATE 35: CPT | Performed by: INTERNAL MEDICINE

## 2025-05-09 PROCEDURE — 80048 BASIC METABOLIC PNL TOTAL CA: CPT | Performed by: INTERNAL MEDICINE

## 2025-05-09 RX ORDER — HYDROMORPHONE HCL IN WATER/PF 6 MG/30 ML
0.2 PATIENT CONTROLLED ANALGESIA SYRINGE INTRAVENOUS EVERY 4 HOURS PRN
Status: DISCONTINUED | OUTPATIENT
Start: 2025-05-09 | End: 2025-05-11

## 2025-05-09 RX ORDER — METHOCARBAMOL 500 MG/1
500 TABLET, FILM COATED ORAL EVERY 8 HOURS SCHEDULED
Status: COMPLETED | OUTPATIENT
Start: 2025-05-09 | End: 2025-05-11

## 2025-05-09 RX ADMIN — HYDROMORPHONE HYDROCHLORIDE 0.2 MG: 0.2 INJECTION, SOLUTION INTRAMUSCULAR; INTRAVENOUS; SUBCUTANEOUS at 09:54

## 2025-05-09 RX ADMIN — METHOCARBAMOL 500 MG: 500 TABLET ORAL at 14:06

## 2025-05-09 RX ADMIN — METHOCARBAMOL 500 MG: 500 TABLET ORAL at 05:38

## 2025-05-09 RX ADMIN — ATORVASTATIN CALCIUM 80 MG: 40 TABLET, FILM COATED ORAL at 16:50

## 2025-05-09 RX ADMIN — ACETAMINOPHEN 975 MG: 325 TABLET, FILM COATED ORAL at 05:38

## 2025-05-09 RX ADMIN — HYDROMORPHONE HYDROCHLORIDE 0.2 MG: 0.2 INJECTION, SOLUTION INTRAMUSCULAR; INTRAVENOUS; SUBCUTANEOUS at 04:07

## 2025-05-09 RX ADMIN — DOCUSATE SODIUM 100 MG: 100 CAPSULE, LIQUID FILLED ORAL at 17:00

## 2025-05-09 RX ADMIN — SEVELAMER HYDROCHLORIDE 1600 MG: 800 TABLET, FILM COATED ORAL at 08:48

## 2025-05-09 RX ADMIN — GABAPENTIN 100 MG: 100 CAPSULE ORAL at 08:48

## 2025-05-09 RX ADMIN — DOCUSATE SODIUM 100 MG: 100 CAPSULE, LIQUID FILLED ORAL at 08:48

## 2025-05-09 RX ADMIN — SEVELAMER HYDROCHLORIDE 1600 MG: 800 TABLET, FILM COATED ORAL at 16:49

## 2025-05-09 RX ADMIN — METHOCARBAMOL 500 MG: 500 TABLET ORAL at 21:51

## 2025-05-09 RX ADMIN — ACETAMINOPHEN 975 MG: 325 TABLET, FILM COATED ORAL at 14:06

## 2025-05-09 RX ADMIN — METHOCARBAMOL 500 MG: 500 TABLET ORAL at 12:12

## 2025-05-09 RX ADMIN — OXYCODONE HYDROCHLORIDE 5 MG: 5 TABLET ORAL at 16:49

## 2025-05-09 RX ADMIN — SEVELAMER HYDROCHLORIDE 1600 MG: 800 TABLET, FILM COATED ORAL at 12:12

## 2025-05-09 RX ADMIN — HYDROMORPHONE HYDROCHLORIDE 0.2 MG: 0.2 INJECTION, SOLUTION INTRAMUSCULAR; INTRAVENOUS; SUBCUTANEOUS at 21:54

## 2025-05-09 RX ADMIN — POLYETHYLENE GLYCOL 3350 17 G: 17 POWDER, FOR SOLUTION ORAL at 08:48

## 2025-05-09 RX ADMIN — OXYCODONE HYDROCHLORIDE 5 MG: 5 TABLET ORAL at 08:38

## 2025-05-09 RX ADMIN — Medication 6 MG: at 21:51

## 2025-05-09 RX ADMIN — ALLOPURINOL 100 MG: 100 TABLET ORAL at 08:48

## 2025-05-09 RX ADMIN — NIFEDIPINE 30 MG: 30 TABLET, EXTENDED RELEASE ORAL at 17:00

## 2025-05-09 RX ADMIN — LIDOCAINE 5% 2 PATCH: 700 PATCH TOPICAL at 08:48

## 2025-05-09 NOTE — ASSESSMENT & PLAN NOTE
Seen by ophthalmology on 4/12 at [facility] due to floaters and blurry vision  Proliferative diabetic retinopathy with vitreous hemorrhage OU.    Ophthalmology recommended OP follow-up immediately after discharge for ongoing management and referral to a retina specialist.  CTH with Increased density within the right globe is unchanged from the examination performed 5/3/2025 but new compared to a prior MRI dated 10/9/2024. Findings may be related to retinal detachment.   Discussed with ophthalmology Dr. Chaudhry who recommended ambulatory referral to the [facility] eye clinic. [phone] for retina specialist.

## 2025-05-09 NOTE — PLAN OF CARE
Problem: OCCUPATIONAL THERAPY ADULT  Goal: Performs self-care activities at highest level of function for planned discharge setting.  See evaluation for individualized goals.  Description: Treatment Interventions: ADL retraining, Functional transfer training, UE strengthening/ROM, Endurance training, Patient/family training, Equipment evaluation/education, Activityengagement, Compensatory technique education          See flowsheet documentation for full assessment, interventions and recommendations.   Outcome: Progressing  Note: Limitation: Decreased ADL status, Decreased UE strength, Decreased Safe judgement during ADL, Decreased endurance, Decreased high-level ADLs, Decreased self-care trans (decreased balance and mobility)  Prognosis: Good  Assessment: Patient seen for OT treatment.  Patient is cooperative and pleasant.  Patient is motivated to walk.  Patient will benefit from continued OT services to maximize functional performance with ADLS.     Rehab Resource Intensity Level, OT: II (Moderate Resource Intensity)

## 2025-05-09 NOTE — ASSESSMENT & PLAN NOTE
Lab Results   Component Value Date    EGFR 13 05/09/2025    EGFR 11 05/08/2025    EGFR 9 05/08/2025    CREATININE 4.34 (H) 05/09/2025    CREATININE 4.90 (H) 05/08/2025    CREATININE 5.83 (H) 05/08/2025   Hgb stable 8-9  Monitor  Epogen per nephrology

## 2025-05-09 NOTE — PHYSICAL THERAPY NOTE
"   PT TREATMENT     05/09/25 1025   PT Last Visit   PT Visit Date 05/09/25   Note Type   Note Type Treatment   Pain Assessment   Pain Assessment Tool 0-10   Pain Score 9   Pain Location/Orientation Location: Back   Restrictions/Precautions   Braces or Orthoses (S)  LSO  (to be worn when HOB>45 degrees or OOB)   Other Precautions Pain;Fall Risk;Bed Alarm;Chair Alarm;Contact/isolation  (HD T, TH, Sat)   General   Chart Reviewed Yes   Cognition   Overall Cognitive Status WFL   Arousal/Participation Alert;Cooperative   Attention Within functional limits   Following Commands Follows multistep commands with increased time or repetition   Subjective   Subjective \"I am not ready to go home\"   Transfers   Sit to Stand 4  Minimal assistance   Stand to Sit 4  Minimal assistance   Stand pivot 4  Minimal assistance   Additional items   (with walker)   Ambulation/Elevation   Gait pattern   (slow amira, inconsistent amira)   Gait Assistance 4  Minimal assist   Assistive Device Rolling walker   Distance 2x75 feet   Balance   Static Sitting Fair   Static Standing Fair -   Ambulatory Fair -   Activity Tolerance   Activity Tolerance Patient tolerated treatment well;Patient limited by fatigue;Patient limited by pain   Assessment   Prognosis Good   Problem List Decreased strength;Decreased range of motion;Decreased endurance;Impaired balance;Decreased mobility;Pain;Orthopedic restrictions   Assessment Pt demonstrates improving activity tolerance overall as pt was able to ambulate 2x 75 feet today in the hallway with a rolling walker and min assist. Pt reports he is feeling better but still concerned about being able to manage at home as pt does not want to go to [facility]. Encouraged pt to continue mobilizing with staff ad vahe with his brace and walker.     The patient's AM-PAC Basic Mobility Inpatient Short Form Raw Score is 15. A Raw score of less than or equal to 16 suggests the patient may benefit from discharge to post-acute " rehabilitation services. Please also refer to the recommendation of the Physical Therapist for safe discharge planning.         Plan   Treatment/Interventions ADL retraining;Functional transfer training;LE strengthening/ROM;Elevations;Therapeutic exercise;Gait training;Bed mobility;Equipment eval/education;Patient/family training   Progress Progressing toward goals   PT Frequency 3-5x/wk   Discharge Recommendation   Rehab Resource Intensity Level, PT II (Moderate Resource Intensity)   AM-PAC Basic Mobility Inpatient   Turning in Flat Bed Without Bedrails 3   Lying on Back to Sitting on Edge of Flat Bed Without Bedrails 3   Moving Bed to Chair 3   Standing Up From Chair Using Arms 3   Walk in Room 3   Climb 3-5 Stairs With Railing 3   Basic Mobility Inpatient Raw Score 18   Basic Mobility Standardized Score 41.05   [facility] Highest Level Of Mobility   -HLM Goal 6: Walk 10 steps or more   JH-HLM Achieved 7: Walk 25 feet or more   End of Consult   Patient Position at End of Consult All needs within reach;Bed/Chair alarm activated;Bedside chair   Licensure   [place] License Number  Niharika Cabrales PT [id number]

## 2025-05-09 NOTE — ASSESSMENT & PLAN NOTE
Wt Readings from Last 3 Encounters:   05/09/25 85.5 kg (188 lb 6.4 oz)   05/05/25 96.6 kg (213 lb)   04/11/25 98.5 kg (217 lb 2.5 oz)   Volume management by HD

## 2025-05-09 NOTE — ASSESSMENT & PLAN NOTE
Lab Results   Component Value Date    EGFR 13 05/09/2025    EGFR 11 05/08/2025    EGFR 9 05/08/2025    CREATININE 4.34 (H) 05/09/2025    CREATININE 4.90 (H) 05/08/2025    CREATININE 5.83 (H) 05/08/2025   HD by nephrology

## 2025-05-09 NOTE — PLAN OF CARE
Problem: Prexisting or High Potential for Compromised Skin Integrity  Goal: Skin integrity is maintained or improved  Description: INTERVENTIONS:- Identify patients at risk for skin breakdown- Assess and monitor skin integrity- Assess and monitor nutrition and hydration status- Monitor labs - Assess for incontinence - Turn and reposition patient- Assist with mobility/ambulation- Relieve pressure over bony prominences- Avoid friction and shearing- Provide appropriate hygiene as needed including keeping skin clean and dry- Evaluate need for skin moisturizer/barrier cream- Collaborate with interdisciplinary team - Patient/family teaching- Consider wound care consult   Outcome: Progressing     Problem: METABOLIC, FLUID AND ELECTROLYTES - ADULT  Goal: Electrolytes maintained within normal limits  Description: INTERVENTIONS:- Monitor labs and assess patient for signs and symptoms of electrolyte imbalances- Administer electrolyte replacement as ordered- Monitor response to electrolyte replacements, including repeat lab results as appropriate- Instruct patient on fluid and nutrition as appropriate  Outcome: Progressing  Goal: Fluid balance maintained  Description: INTERVENTIONS:- Monitor labs - Monitor I/O and WT- Instruct patient on fluid and nutrition as appropriate- Assess for signs & symptoms of volume excess or deficit  Outcome: Progressing     Problem: Nutrition/Hydration-ADULT  Goal: Nutrient/Hydration intake appropriate for improving, restoring or maintaining nutritional needs  Description: Monitor and assess patient's nutrition/hydration status for malnutrition. Collaborate with interdisciplinary team and initiate plan and interventions as ordered.  Monitor patient's weight and dietary intake as ordered or per policy. Utilize nutrition screening tool and intervene as necessary. Determine patient's food preferences and provide high-protein, high-caloric foods as appropriate. INTERVENTIONS:- Monitor oral intake,  urinary output, labs, and treatment plans- Assess nutrition and hydration status and recommend course of action- Evaluate amount of meals eaten- Assist patient with eating if necessary - Allow adequate time for meals- Recommend/ encourage appropriate diets, oral nutritional supplements, and vitamin/mineral supplements- Order, calculate, and assess calorie counts as needed- Recommend, monitor, and adjust tube feedings and TPN/PPN based on assessed needs- Assess need for intravenous fluids- Provide specific nutrition/hydration education as appropriate- Include patient/family/caregiver in decisions related to nutrition  Outcome: Progressing

## 2025-05-09 NOTE — ASSESSMENT & PLAN NOTE
Patient declining to take AC any further because of his recent hospitalization for traumatic retroperitoneal hematoma  Benefits and risk discussed with the patient.

## 2025-05-09 NOTE — OCCUPATIONAL THERAPY NOTE
OT TREATMENT       05/09/25 1550   OT Last Visit   OT Visit Date 05/09/25   Pain Assessment   Pain Assessment Tool 0-10   Pain Score 8   Pain Location/Orientation Location: Back   Restrictions/Precautions   Braces or Orthoses (S)  LSO   Other Precautions Chair Alarm;Bed Alarm;Fall Risk;Contact/isolation;Pain   Lifestyle   Intrinsic Gratification 3 grandsons, 2 daughters   Transfers   Sit to Stand 4  Minimal assistance   Stand to Sit 4  Minimal assistance   Functional Mobility   Functional Mobility 4  Minimal assistance   Additional Comments household distance with RW   Vision - Complex Assessment   Additional Comments pt reports vision loss 2 weeks ago when watching TV, he reports R eye had a floater then went away then the vision didnt come back, plans to see eye doctor. He reports he can't read or drive now.   Cognition   Overall Cognitive Status WFL   Arousal/Participation Cooperative   Attention Within functional limits   Orientation Level Oriented X4   Following Commands Follows multistep commands with increased time or repetition   Assessment   Assessment Patient seen for OT treatment.  Patient is cooperative and pleasant.  Patient is motivated to walk.  Patient will benefit from continued OT services to maximize functional performance with ADLS.   Plan   Treatment Interventions Functional transfer training;Activityengagement   OT Frequency 3-5x/wk   Discharge Recommendation   Rehab Resource Intensity Level, OT II (Moderate Resource Intensity)   AM-PAC Daily Activity Inpatient   Lower Body Dressing 2   Bathing 2   Toileting 3   Upper Body Dressing 3   Grooming 3   Eating 4   Daily Activity Raw Score 17   Daily Activity Standardized Score (Calc for Raw Score >=11) 37.26   AM-PAC Applied Cognition Inpatient   Following a Speech/Presentation 4   Understanding Ordinary Conversation 4   Taking Medications 4   Remembering Where Things Are Placed or Put Away 4   Remembering List of 4-5 Errands 4   Taking Care of  Complicated Tasks 4   Applied Cognition Raw Score 24   Applied Cognition Standardized Score 62.21   Licensure   [place] License Number  Lizeth Mccauleymyrtle MS OTR/L [id number]

## 2025-05-09 NOTE — QUICK NOTE
Chart reviewed.  Vitals reviewed.  Labs reviewed.  Sodium 130, potassium 3.7, hemoglobin 8.6.  Status post hemodialysis yesterday with 2 L UF.  Next hemodialysis session will be tomorrow.  Please call with questions in interim.  Thank you

## 2025-05-09 NOTE — ASSESSMENT & PLAN NOTE
Mechanical fall today.  Planing of low back pain.    CT spine lumbar showed stable obliquely oriented fracture extending through the L2 vertebral body without retropulsion or extension of the posterior elements  LSO Brace when OOB  PT/OT  Prn analgesics.

## 2025-05-09 NOTE — PROGRESS NOTES
"Progress Note - Hospitalist   Name: Naresh Carpio 64 y.o. male I MRN: [id number]  Unit/Bed#: 4 [place] 414-01 I Date of Admission: 5/8/2025   Date of Service: 5/9/2025 I Hospital Day: 0    Assessment & Plan  Ambulatory dysfunction  Mechanical fall today.  Planing of low back pain.    CT spine lumbar showed stable obliquely oriented fracture extending through the L2 vertebral body without retropulsion or extension of the posterior elements  LSO Brace when OOB  PT/OT  Prn analgesics.   Anemia due to chronic kidney disease, on chronic dialysis (Former[facility])  Lab Results   Component Value Date    EGFR 13 05/09/2025    EGFR 11 05/08/2025    EGFR 9 05/08/2025    CREATININE 4.34 (H) 05/09/2025    CREATININE 4.90 (H) 05/08/2025    CREATININE 5.83 (H) 05/08/2025   Hgb stable 8-9  Monitor  Epogen per nephrology   Hypertension  Continue procardia 30 mg qd.   Diabetes mellitus type 2 with peripheral artery disease ([facility])  Lab Results   Component Value Date    HGBA1C 5.7 (H) 02/09/2025       No results for input(s): \"POCGLU\" in the last 72 hours.  SSI      Hyponatremia  Management per nephrology with HD  ESRD (end stage renal disease) ([facility])  Lab Results   Component Value Date    EGFR 13 05/09/2025    EGFR 11 05/08/2025    EGFR 9 05/08/2025    CREATININE 4.34 (H) 05/09/2025    CREATININE 4.90 (H) 05/08/2025    CREATININE 5.83 (H) 05/08/2025   HD by nephrology   Chronic diastolic (congestive) heart failure ([facility])  Wt Readings from Last 3 Encounters:   05/09/25 85.5 kg (188 lb 6.4 oz)   05/05/25 96.6 kg (213 lb)   04/11/25 98.5 kg (217 lb 2.5 oz)   Volume management by HD    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, on chronic dialysis ([facility])  Dialysis management by nephrology  Continue Renagel   Secondary hyperparathyroidism ([facility])  Secondary to ESRD. Nephrology managing.   Diabetic retinopathy ([facility])  Seen by ophthalmology on 4/12 at [facility] due to floaters and blurry vision  Proliferative " diabetic retinopathy with vitreous hemorrhage OU.    Ophthalmology recommended OP follow-up immediately after discharge for ongoing management and referral to a retina specialist.  CTH with Increased density within the right globe is unchanged from the examination performed 5/3/2025 but new compared to a prior MRI dated 10/9/2024. Findings may be related to retinal detachment.   Discussed with ophthalmology Dr. Chaudhry who recommended ambulatory referral to the [facility] eye [facility]. [phone] for retina specialist.     Paroxysmal atrial fibrillation (HCC)  Patient declining to take AC any further because of his recent hospitalization for traumatic retroperitoneal hematoma  Benefits and risk discussed with the patient.    VTE Pharmacologic Prophylaxis:   Patient declined    Mobility:   Basic Mobility Inpatient Raw Score: 15  JH-HLM Goal: 4: Move to chair/commode  JH-HLM Achieved: 5: Stand (1 or more minutes)  JH-HLM Goal achieved. Continue to encourage appropriate mobility.    Patient Centered Rounds: I performed bedside rounds with nursing staff today.   Discussions with Specialists or Other Care Team Provider: Discussed with ophthalmology 5/8    Education and Discussions with Family / Patient: Left voice message for the patient's sister Alicja     Current Length of Stay: 0 day(s)  Current Patient Status: Observation   Certification Statement: The patient will continue to require additional inpatient hospital stay due to back pain, pain management, PT OT evaluation  Discharge Plan: Anticipate discharge in 48 hrs to discharge location to be determined pending rehab evaluations.    Code Status: Level 1 - Full Code    Subjective   Patient seen and examined.  Still reports back pain.  Still complaining of his worsening vision.  I did explain to him that I discussed with ophthalmology who recommended that he follow-up with the retina specialist after discharge.    Objective :  Temp:  [98 °F (36.7 °C)-98.3 °F (36.8  °C)] 98 °F (36.7 °C)  HR:  [60-74] 74  BP: (122-163)/(43-75) 135/45  Resp:  [16-20] 20  SpO2:  [98 %-99 %] 98 %    Body mass index is 24.86 kg/m².     Input and Output Summary (last 24 hours):     Intake/Output Summary (Last 24 hours) at 5/9/2025 0852  Last data filed at 5/8/2025 1351  Gross per 24 hour   Intake 500 ml   Output 2000 ml   Net -1500 ml       Physical Exam  Cardiovascular:      Rate and Rhythm: Normal rate and regular rhythm.   Pulmonary:      Effort: Pulmonary effort is normal.      Breath sounds: Normal breath sounds.   Abdominal:      General: Bowel sounds are normal.      Palpations: Abdomen is soft.   Neurological:      Mental Status: He is alert.           Lines/Drains:  Lines/Drains/Airways       Active Status       Name Placement date Placement time Site Days    HD Permanent Double Catheter 08/30/23  --  Internal jugular  618                            Lab Results: I have reviewed the following results:   Results from last 7 days   Lab Units 05/09/25  0414 05/08/25  0749   WBC Thousand/uL 5.42 8.16   HEMOGLOBIN g/dL 8.6* 9.4*   HEMATOCRIT % 27.3* 30.2*   PLATELETS Thousands/uL 202 231   SEGS PCT %  --  74   LYMPHO PCT %  --  13*   MONO PCT %  --  9   EOS PCT %  --  3     Results from last 7 days   Lab Units 05/09/25  0414 05/05/25  0532 05/04/25  0945   SODIUM mmol/L 130*   < > 129*   POTASSIUM mmol/L 3.7   < > 4.2   CHLORIDE mmol/L 96   < > 95*   CO2 mmol/L 22   < > 22   BUN mg/dL 19   < > 24   CREATININE mg/dL 4.34*   < > 4.70*   ANION GAP mmol/L 12   < > 12   CALCIUM mg/dL 8.3*   < > 9.3   ALBUMIN g/dL  --   --  4.2   TOTAL BILIRUBIN mg/dL  --   --  0.65   ALK PHOS U/L  --   --  118*   ALT U/L  --   --  7   AST U/L  --   --  14   GLUCOSE RANDOM mg/dL 89   < > 82    < > = values in this interval not displayed.                       Recent Cultures (last 7 days):         Imaging Results Review: I reviewed radiology reports from this admission including: CT L-spine.      Last 24 Hours  Medication List:     Current Facility-Administered Medications:     acetaminophen (TYLENOL) tablet 650 mg, Q6H PRN    acetaminophen (TYLENOL) tablet 975 mg, Q8H KEMAL    allopurinol (ZYLOPRIM) tablet 100 mg, Daily    atorvastatin (LIPITOR) tablet 80 mg, Daily With Dinner    docusate sodium (COLACE) capsule 100 mg, BID    epoetin jessica (EPOGEN,PROCRIT) injection 3,000 Units, Once per day on Tuesday Thursday Saturday **AND** epoetin jessica (EPOGEN,PROCRIT) injection 2,000 Units, Once per day on Tuesday Thursday Saturday    gabapentin (NEURONTIN) capsule 100 mg, Once per day on Monday Wednesday Friday    HYDROmorphone HCl (DILAUDID) injection 0.2 mg, Q4H PRN    lidocaine (LIDODERM) 5 % patch 2 patch, Daily    melatonin tablet 6 mg, HS    methocarbamol (ROBAXIN) tablet 500 mg, TID PRN    methocarbamol (ROBAXIN) tablet 500 mg, Q8H KEMAL    NIFEdipine (PROCARDIA XL) 24 hr tablet 30 mg, After Dinner    ondansetron (ZOFRAN-ODT) dispersible tablet 4 mg, Q6H PRN    oxyCODONE (ROXICODONE) IR tablet 5 mg, Q8H PRN    polyethylene glycol (MIRALAX) packet 17 g, Daily PRN    sevelamer (RENAGEL) tablet 1,600 mg, TID With Meals    white petrolatum-mineral oil (EUCERIN,HYDROCERIN) cream, TID PRN    Administrative Statements   Today, Patient Was Seen By: Devante Shah MD  I have spent a total time of 35 minutes in caring for this patient on the day of the visit/encounter including Diagnostic results, Reviewing/placing orders in the medical record (including tests, medications, and/or procedures), Obtaining or reviewing history  , and Communicating with other healthcare professionals .    **Please Note: This note may have been constructed using a voice recognition system.**

## 2025-05-10 ENCOUNTER — APPOINTMENT (INPATIENT)
Dept: DIALYSIS | Facility: HOSPITAL | Age: 65
DRG: 555 | End: 2025-05-10
Payer: MEDICARE

## 2025-05-10 PROBLEM — H35.00 RETINOPATHY: Status: ACTIVE | Noted: 2025-05-08

## 2025-05-10 PROBLEM — D64.9 CHRONIC ANEMIA: Status: ACTIVE | Noted: 2022-01-21

## 2025-05-10 LAB
ANION GAP SERPL CALCULATED.3IONS-SCNC: 13 MMOL/L (ref 4–13)
BUN SERPL-MCNC: 31 MG/DL (ref 5–25)
CALCIUM SERPL-MCNC: 8.4 MG/DL (ref 8.4–10.2)
CHLORIDE SERPL-SCNC: 97 MMOL/L (ref 96–108)
CO2 SERPL-SCNC: 21 MMOL/L (ref 21–32)
CREAT SERPL-MCNC: 5.63 MG/DL (ref 0.6–1.3)
ERYTHROCYTE [DISTWIDTH] IN BLOOD BY AUTOMATED COUNT: 18.6 % (ref 11.6–15.1)
GFR SERPL CREATININE-BSD FRML MDRD: 9 ML/MIN/1.73SQ M
GLUCOSE SERPL-MCNC: 99 MG/DL (ref 65–140)
HCT VFR BLD AUTO: 27.2 % (ref 36.5–49.3)
HGB BLD-MCNC: 8.5 G/DL (ref 12–17)
MCH RBC QN AUTO: 30.9 PG (ref 26.8–34.3)
MCHC RBC AUTO-ENTMCNC: 31.3 G/DL (ref 31.4–37.4)
MCV RBC AUTO: 99 FL (ref 82–98)
MRSA NOSE QL CULT: ABNORMAL
MRSA NOSE QL CULT: ABNORMAL
PLATELET # BLD AUTO: 210 THOUSANDS/UL (ref 149–390)
PMV BLD AUTO: 8.9 FL (ref 8.9–12.7)
POTASSIUM SERPL-SCNC: 4.1 MMOL/L (ref 3.5–5.3)
RBC # BLD AUTO: 2.75 MILLION/UL (ref 3.88–5.62)
SODIUM SERPL-SCNC: 131 MMOL/L (ref 135–147)
WBC # BLD AUTO: 5.37 THOUSAND/UL (ref 4.31–10.16)

## 2025-05-10 PROCEDURE — 85027 COMPLETE CBC AUTOMATED: CPT | Performed by: INTERNAL MEDICINE

## 2025-05-10 PROCEDURE — 99233 SBSQ HOSP IP/OBS HIGH 50: CPT | Performed by: INTERNAL MEDICINE

## 2025-05-10 PROCEDURE — 80048 BASIC METABOLIC PNL TOTAL CA: CPT | Performed by: INTERNAL MEDICINE

## 2025-05-10 PROCEDURE — 99232 SBSQ HOSP IP/OBS MODERATE 35: CPT | Performed by: INTERNAL MEDICINE

## 2025-05-10 RX ADMIN — HYDROMORPHONE HYDROCHLORIDE 0.2 MG: 0.2 INJECTION, SOLUTION INTRAMUSCULAR; INTRAVENOUS; SUBCUTANEOUS at 13:57

## 2025-05-10 RX ADMIN — ACETAMINOPHEN 975 MG: 325 TABLET, FILM COATED ORAL at 05:55

## 2025-05-10 RX ADMIN — METHOCARBAMOL 500 MG: 500 TABLET ORAL at 21:00

## 2025-05-10 RX ADMIN — SEVELAMER HYDROCHLORIDE 1600 MG: 800 TABLET, FILM COATED ORAL at 20:00

## 2025-05-10 RX ADMIN — Medication 6 MG: at 21:01

## 2025-05-10 RX ADMIN — OXYCODONE HYDROCHLORIDE 5 MG: 5 TABLET ORAL at 03:08

## 2025-05-10 RX ADMIN — METHOCARBAMOL 500 MG: 500 TABLET ORAL at 13:31

## 2025-05-10 RX ADMIN — OXYCODONE HYDROCHLORIDE 5 MG: 5 TABLET ORAL at 17:27

## 2025-05-10 RX ADMIN — SEVELAMER HYDROCHLORIDE 1600 MG: 800 TABLET, FILM COATED ORAL at 12:11

## 2025-05-10 RX ADMIN — HYDROMORPHONE HYDROCHLORIDE 0.2 MG: 0.2 INJECTION, SOLUTION INTRAMUSCULAR; INTRAVENOUS; SUBCUTANEOUS at 20:56

## 2025-05-10 RX ADMIN — EPOETIN ALFA 2000 UNITS: 2000 SOLUTION INTRAVENOUS; SUBCUTANEOUS at 15:24

## 2025-05-10 RX ADMIN — ATORVASTATIN CALCIUM 80 MG: 40 TABLET, FILM COATED ORAL at 17:27

## 2025-05-10 RX ADMIN — DOCUSATE SODIUM 100 MG: 100 CAPSULE, LIQUID FILLED ORAL at 17:27

## 2025-05-10 RX ADMIN — EPOETIN ALFA 3000 UNITS: 3000 SOLUTION INTRAVENOUS; SUBCUTANEOUS at 15:23

## 2025-05-10 RX ADMIN — DOCUSATE SODIUM 100 MG: 100 CAPSULE, LIQUID FILLED ORAL at 09:24

## 2025-05-10 RX ADMIN — ACETAMINOPHEN 975 MG: 325 TABLET, FILM COATED ORAL at 13:31

## 2025-05-10 RX ADMIN — SEVELAMER HYDROCHLORIDE 1600 MG: 800 TABLET, FILM COATED ORAL at 08:46

## 2025-05-10 RX ADMIN — ALLOPURINOL 100 MG: 100 TABLET ORAL at 09:24

## 2025-05-10 RX ADMIN — NIFEDIPINE 30 MG: 30 TABLET, EXTENDED RELEASE ORAL at 17:28

## 2025-05-10 RX ADMIN — METHOCARBAMOL 500 MG: 500 TABLET ORAL at 05:56

## 2025-05-10 NOTE — ASSESSMENT & PLAN NOTE
Hemoglobin 8.5 today, below goal  IV Epogen 5000 units with HD  Monitor hemoglobin, history of retroperitoneal hematoma status post IR embolization on recent hospitalization in 04/2025

## 2025-05-10 NOTE — PROGRESS NOTES
Progress Note - Hospitalist   Name: Naresh Carpio 64 y.o. male I MRN: [id number]  Unit/Bed#: 4 [place] 414-01 I Date of Admission: 5/8/2025   Date of Service: 5/10/2025 I Hospital Day: 1    Assessment & Plan  Ambulatory dysfunction  Presented status post mechanical fall with complaints of lower back pain and finding of a L2 compression fracture   CT spine lumbar showed stable obliquely oriented fracture extending through the L2 vertebral body without retropulsion or extension of the posterior elements  Encourage compliance to LSO brace when not sleeping or showering  Ongoing PT/OT -> patient now agreeable to another course of skilled rehab (awaiting placement)  PRN pain control  ESRD (end stage renal disease) ([facility])  Routine hemodialysis per nephrology  On Renagel supplementation  Chronic diastolic (congestive) heart failure ([facility])  Fluid management via dialysis  Fluid restriction enforced  Monitor/replete electrolyte deficiencies, if present  Essential hypertension  Continue Procardia  Chronic anemia  In the setting of ESRD - continue Epogen w/ dialysis sessions  H/H stable  Hyponatremia  Managed via dialysis  Retinopathy  Seen by ophthalmology last month due to floaters and blurry vision  Proliferative retinopathy with vitreous hemorrhage OU  Ophthalmology recommended OP follow-up immediately after discharge for ongoing management and referral to a retinal specialist  CT of head with increased density within the right globe is unchanged from the examination performed 5/3/2025 but new compared to a prior MRI dated 10/9/2024 - possibly related to retinal detachment  Previous provider discussed with ophthalmology, Dr. Chaudhry, who recommended ambulatory referral to the [facility] eye clinic ([phone]) for retina specialist appointment  Paroxysmal atrial fibrillation (HCC)  Has previously declined anticoagulation due to recent hospitalization for traumatic retroperitoneal hematoma  Benefits and risk discussed with  the patient, by previous provider  Rate controlled off agents      VTE Pharmacologic Prophylaxis: VTE Score: 4 Moderate Risk (Score 3-4) - Pharmacological DVT Prophylaxis Contraindicated. Sequential Compression Devices Ordered.    AM-PAC Basic Mobility:  Basic Mobility Inpatient Raw Score: 18  JH-HLM Goal: 6: Walk 10 steps or more  JH-HLM Achieved: 4: Move to chair/commode  JH-HLM Goal NOT achieved. Continue with multidisciplinary rounding and encourage appropriate mobility to improve upon JH-HLM goals.    Patient Centered Rounds:  I have performed bedside rounds and discussed plan of care with nursing today.  Discussions with Specialists or Other Care Team Provider:  see above assessments if applicable    Education and Discussions with Family / Patient: Patient at bedside, along with sister, over the phone today    Time Spent for Care:  35 minutes. More than 50% of total time spent on counseling and coordination of care, on one or more of the following: performing physical exam; counseling and coordination of care, obtaining or reviewing history, documenting in the medical record, reviewing/ordering tests/medications/procedures, and communicating with other healthcare professionals.    Current Length of Stay: 1 day(s)  Current Patient Status: Inpatient   Certification Statement:  Patient will continue to require additional hospital stay due to assessments as noted above.    Code Status: Level 1 - Full Code      Subjective     Encountered earlier in the day.  Sitting upright in the chair wearing back brace.  Denies uncontrolled pain at this time, at rest.  Expresses some anxiety over visual issue.      Objective     Vitals:   Temp (24hrs), Av.2 °F (36.8 °C), Min:97.8 °F (36.6 °C), Max:98.9 °F (37.2 °C)    Temp:  [97.8 °F (36.6 °C)-98.9 °F (37.2 °C)] 97.8 °F (36.6 °C)  HR:  [60-75] 75  Resp:  [16-19] 18  BP: (131-163)/(53-67) 131/53  SpO2:  [97 %-100 %] 100 %  Body mass index is 25.91 kg/m².     Input and Output  Summary (last 24 hours):       Intake/Output Summary (Last 24 hours) at 5/10/2025 1741  Last data filed at 5/10/2025 1425  Gross per 24 hour   Intake 690 ml   Output 50 ml   Net 640 ml       Physical Exam:     GENERAL Mildly weak/fatigued   HEAD Normocephalic/atraumatic   EYES Nonicteric   MOUTH   Mucosa moist   NECK   Supple - full range of motion   CARDIAC Rate controlled   PULMONARY Fairly clear to auscultation without labored respirations at rest   ABDOMEN Currently nontender/nondistended   MUSCULOSKELETAL   Motor strength/range of motion deconditioned - back brace in place   NEUROLOGIC   Alert/oriented at baseline   PSYCHIATRIC   Mood/affect anxious         Labs & Recent Cultures:  Results from last 7 days   Lab Units 05/10/25  0433 05/09/25  0414 05/08/25  0749   WBC Thousand/uL 5.37   < > 8.16   HEMOGLOBIN g/dL 8.5*   < > 9.4*   HEMATOCRIT % 27.2*   < > 30.2*   PLATELETS Thousands/uL 210   < > 231   SEGS PCT %  --   --  74   LYMPHO PCT %  --   --  13*   MONO PCT %  --   --  9   EOS PCT %  --   --  3    < > = values in this interval not displayed.     Results from last 7 days   Lab Units 05/10/25  0433 05/05/25  0532 05/04/25  0945   POTASSIUM mmol/L 4.1   < > 4.2   CHLORIDE mmol/L 97   < > 95*   CO2 mmol/L 21   < > 22   BUN mg/dL 31*   < > 24   CREATININE mg/dL 5.63*   < > 4.70*   CALCIUM mg/dL 8.4   < > 9.3   ALK PHOS U/L  --   --  118*   ALT U/L  --   --  7   AST U/L  --   --  14    < > = values in this interval not displayed.                                 Lines/Drains/Telemetry:  Invasive Devices       Peripheral Intravenous Line  Duration             Peripheral IV 05/08/25 Left Antecubital 2 days              Hemodialysis Catheter  Duration             HD Permanent Double Catheter 619 days                    Last 24 Hours Medication List:   Current Facility-Administered Medications   Medication Dose Route Frequency Provider Last Rate    acetaminophen  650 mg Oral Q6H PRN Devante Shah MD       acetaminophen  975 mg Oral Q8H [facility] Devante Shah MD      allopurinol  100 mg Oral Daily Devante Shah MD      atorvastatin  80 mg Oral Daily With Dinner Devante Shah MD      docusate sodium  100 mg Oral BID Devante Shah MD      epoetin jessica  3,000 Units Subcutaneous Once per day on Tuesday Thursday Saturday German Linton MD      And    epoetin jessica  2,000 Units Subcutaneous Once per day on Tuesday Thursday Saturday German Linton MD      gabapentin  100 mg Oral Once per day on Monday Wednesday Friday Devante Shah MD      HYDROmorphone  0.2 mg Intravenous Q4H PRN GRANT Rich      lidocaine  2 patch Topical Daily Devante Shah MD      melatonin  6 mg Oral HS Devante Shah MD      methocarbamol  500 mg Oral TID PRN Devante Shah MD      methocarbamol  500 mg Oral Q8H KEMAL GRANT Rich      NIFEdipine  30 mg Oral After Dinner Devante Shah MD      ondansetron  4 mg Oral Q6H PRN Devante Shah MD      oxyCODONE  5 mg Oral Q8H PRN Devante Shah MD      polyethylene glycol  17 g Oral Daily PRN Devante Shah MD      sevelamer  1,600 mg Oral TID With Meals Devante Shah MD      white petrolatum-mineral oil   Topical TID PRN MD JAMIN Luque MD   Hospitalist - [facility]        ** Please Note:  Documentation is constructed using a voice recognition dictation system.  An occasional wrong word/phrase or “sound-a-like” substitution may have been picked up by dictation device due to the inherent limitations of voice recognition software.  Read the chart carefully and recognize, using reasonable context, where substitutions may have occurred.**

## 2025-05-10 NOTE — PROGRESS NOTES
NEPHROLOGY HOSPITAL PROGRESS NOTE   Naresh Carpio 64 y.o. male MRN: [id number]  Unit/Bed#: [address] [place] [address] Encounter: [id number]  Reason for Consult: ESRD on HD  Assessment & Plan  ESRD (end stage renal disease) ([facility])  TTS at [facility]  Access: Right chest wall permacath  EDW 93 kg  Last HD: 05/08  Next HD: 05/10, today  Anemia due to chronic kidney disease, on chronic dialysis ([facility])  Hemoglobin 8.5 today, below goal  IV Epogen 5000 units with HD  Monitor hemoglobin, history of retroperitoneal hematoma status post IR embolization on recent hospitalization in 04/2025  Hypertension  Blood pressure controlled, continue nifedipine  Monitor blood pressure  Diabetes mellitus type 2 with peripheral artery disease ([facility])  Management per primary team  Chronic diastolic (congestive) heart failure (HCC)  UF on HD  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, on chronic dialysis ([facility])  Continue Renvela 2 tablet 3 times daily with meals  Hyponatremia  Sodium level today 131  UF on HD  Sodium bath 138 on HD  Fluid restrict 1500 cc per 24 hours  Ambulatory dysfunction  Management per primary team  Paroxysmal atrial fibrillation (HCC)  Management per primary team  Diabetic retinopathy ([facility])  Management per primary team    I have reviewed the nephrology recommendations including regular session of hemodialysis today and to maintain TTS schedule while inpatient, with internal medicine team, and we are in agreement with renal plan including the information outlined above.    SUBJECTIVE / 24H INTERVAL HISTORY:  Complains of back pain.  Denies dyspnea.  Leg swelling is stable.    OBJECTIVE:  Current Weight: Weight - Scale: 89.1 kg (196 lb 6.4 oz)  Vitals:    05/09/25 2045 05/09/25 2253 05/10/25 0531 05/10/25 0733   BP:  141/57  136/55   BP Location:       Pulse:  60  60   Resp:  18  19   Temp:  98 °F (36.7 °C)     TempSrc:       SpO2: 97% 97%  98%   Weight:   89.1 kg (196 lb 6.4  oz)    Height:           Intake/Output Summary (Last 24 hours) at 5/10/2025 0845  Last data filed at 5/10/2025 0601  Gross per 24 hour   Intake 964 ml   Output 150 ml   Net 814 ml     Review of Systems   Constitutional:  Negative for chills and fever.   HENT:  Negative for ear pain and sore throat.    Eyes:  Negative for pain and visual disturbance.   Respiratory:  Negative for cough and shortness of breath.    Cardiovascular:  Positive for leg swelling. Negative for chest pain and palpitations.   Gastrointestinal:  Negative for abdominal pain and vomiting.   Genitourinary:  Negative for dysuria and hematuria.   Musculoskeletal:  Positive for back pain. Negative for arthralgias.   Skin:  Negative for color change and rash.   Neurological:  Negative for seizures and syncope.   All other systems reviewed and are negative.    Physical Exam  Vitals and nursing note reviewed.   Constitutional:       General: He is not in acute distress.     Appearance: He is well-developed.   HENT:      Head: Normocephalic and atraumatic.   Eyes:      Conjunctiva/sclera: Conjunctivae normal.   Cardiovascular:      Rate and Rhythm: Normal rate and regular rhythm.      Pulses: Normal pulses.      Heart sounds: Normal heart sounds. No murmur heard.     Comments: Right chest wall permacath present  Pulmonary:      Effort: Pulmonary effort is normal. No respiratory distress.      Breath sounds: Normal breath sounds.   Abdominal:      Palpations: Abdomen is soft.      Tenderness: There is no abdominal tenderness.   Musculoskeletal:         General: No swelling.      Cervical back: Neck supple.      Right lower leg: Edema present.      Left lower leg: Edema present.   Skin:     General: Skin is warm and dry.      Capillary Refill: Capillary refill takes less than 2 seconds.   Neurological:      Mental Status: He is alert.   Psychiatric:         Mood and Affect: Mood normal.       Medications:    Current Facility-Administered Medications:      acetaminophen (TYLENOL) tablet 650 mg, 650 mg, Oral, Q6H PRN, Devante Shah MD    acetaminophen (TYLENOL) tablet 975 mg, 975 mg, Oral, Q8H KEMAL, Devante Shah MD, 975 mg at 05/10/25 0555    allopurinol (ZYLOPRIM) tablet 100 mg, 100 mg, Oral, Daily, Devante Shah MD, 100 mg at 05/09/25 0848    atorvastatin (LIPITOR) tablet 80 mg, 80 mg, Oral, Daily With Dinner, Devante Shah MD, 80 mg at 05/09/25 1650    docusate sodium (COLACE) capsule 100 mg, 100 mg, Oral, BID, Devante Shah MD, 100 mg at 05/09/25 1700    epoetin jessica (EPOGEN,PROCRIT) injection 3,000 Units, 3,000 Units, Subcutaneous, Once per day on Tuesday Thursday Saturday, 3,000 Units at 05/08/25 1418 **AND** epoetin jessica (EPOGEN,PROCRIT) injection 2,000 Units, 2,000 Units, Subcutaneous, Once per day on Tuesday Thursday Saturday, German Linton MD, 2,000 Units at 05/08/25 1418    gabapentin (NEURONTIN) capsule 100 mg, 100 mg, Oral, Once per day on Monday Wednesday Friday, Devante Shah MD, 100 mg at 05/09/25 0848    HYDROmorphone HCl (DILAUDID) injection 0.2 mg, 0.2 mg, Intravenous, Q4H PRN, GRANT Rich, 0.2 mg at 05/09/25 2154    lidocaine (LIDODERM) 5 % patch 2 patch, 2 patch, Topical, Daily, Devante Shah MD, 2 patch at 05/09/25 0848    melatonin tablet 6 mg, 6 mg, Oral, HS, Devante Shah MD, 6 mg at 05/09/25 2151    methocarbamol (ROBAXIN) tablet 500 mg, 500 mg, Oral, TID PRN, Devante Shah MD, 500 mg at 05/09/25 1212    methocarbamol (ROBAXIN) tablet 500 mg, 500 mg, Oral, Q8H KEMAL, Cuiyin Yurik, CRNP, 500 mg at 05/10/25 0556    NIFEdipine (PROCARDIA XL) 24 hr tablet 30 mg, 30 mg, Oral, After Dinner, Devatne Shah MD, 30 mg at 05/09/25 1700    ondansetron (ZOFRAN-ODT) dispersible tablet 4 mg, 4 mg, Oral, Q6H PRN, Devante Shah MD    oxyCODONE (ROXICODONE) IR tablet 5 mg, 5 mg, Oral, Q8H PRN, Devante Shah MD, 5 mg at 05/10/25 0308    polyethylene glycol (MIRALAX) packet 17 g, 17 g, Oral, Daily PRN, Devante Shah MD, 17 g at 05/09/25 0848    sevelamer  "(RENAGEL) tablet 1,600 mg, 1,600 mg, Oral, TID With Meals, Devante Shah MD, 1,600 mg at 05/09/25 1649    white petrolatum-mineral oil (EUCERIN,HYDROCERIN) cream, , Topical, TID PRN, Devante Shah MD    Laboratory Results:  Results from last 7 days   Lab Units 05/10/25  0433 05/09/25  0414 05/08/25  0948 05/08/25  0749 05/05/25  0532 05/04/25  0945   WBC Thousand/uL 5.37 5.42  --  8.16  --  5.38   HEMOGLOBIN g/dL 8.5* 8.6*  --  9.4*  --  9.7*   HEMATOCRIT % 27.2* 27.3*  --  30.2*  --  32.2*   PLATELETS Thousands/uL 210 202  --  231  --  214   POTASSIUM mmol/L 4.1 3.7 3.7 5.2 4.1 4.2   CHLORIDE mmol/L 97 96 98 96 95* 95*   CO2 mmol/L 21 22 23 20* 24 22   BUN mg/dL 31* 19 25 29* 31* 24   CREATININE mg/dL 5.63* 4.34* 4.90* 5.83* 6.09* 4.70*   CALCIUM mg/dL 8.4 8.3* 8.0* 8.7 9.0 9.3   MAGNESIUM mg/dL  --   --   --  2.3  --   --    PHOSPHORUS mg/dL  --   --   --  3.6  --   --        Portions of the record may have been created with voice recognition software. Occasional wrong word or \"sound a like\" substitutions may have occurred due to the inherent limitations of voice recognition software. Read the chart carefully and recognize, using context, where substitutions have occurred.If you have any questions, please contact the dictating provider.    "

## 2025-05-10 NOTE — CASE MANAGEMENT
Case Management Discharge Planning Note    Patient name Naresh Carpio  Location 4 [place] [facility]/4 [place] 414-* MRN [id number]  : 1960 Date 5/10/2025       Current Admission Date: 2025  Current Admission Diagnosis:Ambulatory dysfunction   Patient Active Problem List    Diagnosis Date Noted Date Diagnosed    Ambulatory dysfunction 2025     Diabetic retinopathy (HCC) 2025     At risk for acid-base imbalance 2025     Electrolyte imbalance risk 2025     Hallucinations 2025     Type 2 diabetes mellitus with foot ulcer, without long-term current use of insulin ([facility]) 2025     Insomnia 2025     Hyperphosphatemia 2025     Wound of skin 2025     At high risk for skin breakdown 2025     At risk for venous thromboembolism (VTE) 2025     Impaired mobility and activities of daily living 2025     Visual disturbance 2025     Pseudoaneurysm ([facility]) 2025     Acute pain due to trauma 2025     ESRD on dialysis ([facility]) 2025     Wounds, multiple 2025     Traumatic retroperitoneal hemorrhage 2025     Secondary hyperparathyroidism ([facility]) 2025     At risk for electrolyte imbalance 2025     Multiple open wounds of foot 2025     Disorder of acid-base balance 2025     L2 vertebral fracture ([facility]) 2025     Non-compliance with treatment 2025     Generalized weakness 2025     Gout 2025     Closed fracture of proximal end of left humerus with routine healing 02/10/2025     Left shoulder pain 2025     ESRD (end stage renal disease) on dialysis ([facility]) 2025     Primary hypertension 2025     Anemia in ESRD (end-stage renal disease)  ([facility]) 2025     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, on chronic dialysis ([facility]) 2025     Renal lesion 2024     Chronic wound 2024     Chronic diastolic (congestive) heart  failure (HCC) 11/19/2024     Pleural effusion 11/19/2024     Paroxysmal atrial fibrillation (HCC)      ESRD (end stage renal disease) ([facility]) 10/25/2024     Hyponatremia 10/19/2024     Acute on chronic anemia 10/14/2024     PAD (peripheral artery disease) ([facility]) 10/08/2024     Hyperkalemia 04/06/2024     Difficulty with speech 03/19/2024     History of amputation of hallux ([facility]) 03/18/2024     At risk for constipation 09/06/2023     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 mellitus, with necrosis of bone ([facility])      Acquired deformity of foot, right      Charcot's joint      Open wound of right foot 08/21/2023     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 mellitus, with bone involvement without evidence of necrosis ([facility])      Diabetic ulcer of left midfoot associated with type 2 diabetes mellitus, limited to breakdown of skin ([facility])      Diabetic polyneuropathy associated with type 2 diabetes mellitus ([facility])      Diabetes mellitus type 2 with peripheral artery disease ([facility])      History of amputation of lesser toe of left foot ([facility])      Onychomycosis      Traumatic retroperitoneal hematoma 08/19/2023     Osteoarthritis of left hip 07/27/2022     Severe Left Orchalgia 07/26/2022     Right shoulder pain 07/26/2022     Left hip pain 07/06/2022     Hemodialysis status ([facility])      Hypervolemia      Anemia due to chronic kidney disease, on chronic dialysis ([facility]) 01/21/2022     History of prediabetes      Hypertension      History of TIA (transient ischemic attack)      T10 vertebral fracture ([facility])        LOS (days): 1  Geometric Mean LOS (GMLOS) (days): 3.8  Days to GMLOS:2.7     OBJECTIVE:  Risk of Unplanned Readmission Score: 67.67     Current admission status: Inpatient   Preferred Pharmacy:   [id number] - [place], [place] - [address]  [facility]  [address] [address]  Phone: [phone] Fax: [phone]    [facility] #[id number] -  [place], [place] - [facility] [address]  [address] [address]  Phone: [phone] Fax: [phone]    Primary Care Provider: Jeffrey Jackson    Primary Insurance: MEDICARE  Secondary Insurance:     DISCHARGE DETAILS:    Discharge planning discussed with:: Patient  Freedom of Choice: Yes  Comments - Freedom of Choice: SW following to assist with DCP.  Notified by physician that pt is now considering returning to rehab after talking with his sister.  SW provided support as pt expressed feelings related to failing eyesight and desire not to have to live in nursing home.  SW talked with pt about option of applying for Medicaid for possible services at home including HHA and transportation to dialysis to assist in remaining at home as well as long term care if necessary.  Pt's dialysis clinic has transitioned to [facility] clinic.  Pt's chair time is TTS at 10:00am.  SW offered to make referrals to local rehab facilities to determine availability and possible assistance with dialysis transportation.  Pt was agreeable with process.  SW offered ongoing support and assistance.  SW will continue to follow to monitor progress and assist as needed.    Other Referral/Resources/Interventions Provided:  Interventions: Short Term Rehab  Referral Comments: [facility] referrals have been made.    Treatment Team Recommendation: Short Term Rehab, Home with Home Health Care  Discharge Destination Plan:: Short Term Rehab  Transport at Discharge :  (pending progress)

## 2025-05-10 NOTE — DISCHARGE INSTR - OTHER ORDERS
Wound Care Plan:  1-Cleanse wounds to bilateral feet with wound cleanser or mild soap and water and pat dry. Apply Betadine to wounds, cover with gauze or ABD pad, rolled gauze and tape.   Change 3x/week and as needed for soilage/dislodgement.  2-Cleanse wound to right elbow with wound cleanser or mild soap and water and pat dry. Apply Betadine to wound and cover with bordered foam dressing.   Change 3x/week and as needed for soilage/dislodgement.  3-Cleanse wound to bilateral lower legs with wound cleanser or mild soap and water and pat dry. Apply Betadine to wounds and cover with bordered foam dressing.  Change 3x/week and as needed for soilage/dislodgement.  4-Apply Triad paste to mid-sacrobuttock wound 2x/day and as needed.  5-Apply Prevent barrier cream to bilateral heels 3x/week with dressing changes and as needed.  6-Float heels on 2 pillows in bed to offload pressure  7-Use pressure redistribution cushion when out of bed to chair. Avoid prolonged sitting.  8-Turn/reposition every 2 hours in bed and every hour when out of bed to chair for pressure re-distribution on skin.  9-Moisturize skin daily with skin nourishing cream   10-You will receive call from Central Scheduling to follow up at [facility]. No need to call unless you need to reschedule: [phone].

## 2025-05-10 NOTE — ASSESSMENT & PLAN NOTE
Has previously declined anticoagulation due to recent hospitalization for traumatic retroperitoneal hematoma  Benefits and risk discussed with the patient, by previous provider  Rate controlled off agents

## 2025-05-10 NOTE — ASSESSMENT & PLAN NOTE
Fluid management via dialysis  Fluid restriction enforced  Monitor/replete electrolyte deficiencies, if present

## 2025-05-10 NOTE — ASSESSMENT & PLAN NOTE
URGENT CARE NOTE    Chief Complaint   Patient presents with    URI     Sore throat Along with dripping nose, headache, coughing. Chest also feels heavy with \"pressure in her upper back\". Feels like sometimes it is hard to catch her breath.  Symptoms started Saturday        HPI: Neli Guzman   The patient is a 35-year-old female who presents today with sore throat, headache, cough, rhinorrhea, and chest congestion onset 3 days ago.    She began experiencing symptoms on Saturday morning, initially presenting as a sore throat with pain during swallowing. This was followed by postnasal drip, which has been causing nausea. Subsequently, she developed a headache and cough, accompanied by pressure in her chest and back. She reports no fever or vomiting. Her dysphagia has since resolved, but the cough and postnasal drip persist.  She used her inhaler on Sunday night. Her daughter was diagnosed with strep throat last week, and she would like to be tested.        The following patient allergies, current medications, and past medical history were reviewed as below.  Current Outpatient Medications   Medication Sig Dispense Refill    predniSONE (DELTASONE) 20 MG tablet 3 tabs PO QD for 2 days then 2 tabs PO QD for 2 days then 1 tab PO QD for 3 days (Patient not taking: Reported on 1/21/2025) 13 tablet 0    benzonatate (TESSALON PERLES) 200 MG capsule Take 1 capsule by mouth every 8 hours as needed for Cough. (Patient not taking: Reported on 1/21/2025) 30 capsule 0    albuterol 108 (90 Base) MCG/ACT inhaler Inhale 2 puffs into the lungs every 4 hours as needed for Shortness of Breath or Wheezing. 18 g 3    montelukast (SINGULAIR) 10 MG tablet Take 1 tablet by mouth nightly. 90 tablet 3    omeprazole (PrilOSEC) 20 MG capsule Take 1 capsule by mouth daily. 90 capsule 3     No current facility-administered medications for this visit.      ALLERGIES:   Allergen Reactions  Seen by ophthalmology last month due to floaters and blurry vision  Proliferative retinopathy with vitreous hemorrhage OU  Ophthalmology recommended OP follow-up immediately after discharge for ongoing management and referral to a retinal specialist  CT of head with increased density within the right globe is unchanged from the examination performed 5/3/2025 but new compared to a prior MRI dated 10/9/2024 - possibly related to retinal detachment  Previous provider discussed with ophthalmology, Dr. Chaudhry, who recommended ambulatory referral to the [facility] eye clinic ([phone]) for retina specialist appointment      Seasonal Runny Nose and Cough     Past Medical History:   Diagnosis Date    Allergic conjunctivitis     Allergic rhinitis     Asthma (CMD)        ROS:   ALL 13 SYSTEMS REVIEWED AND ARE NEGATIVE  UNLESS OTHERWISE NOTED IN HPI    PHYSICAL EXAMINATION:  Visit Vitals  /88 (BP Location: LUE - Left upper extremity, Patient Position: Sitting)   Pulse 87   Temp 98.3 °F (36.8 °C) (Tympanic)   Resp 16   Wt 87.1 kg (192 lb 2.1 oz)   LMP 01/06/2023 (Exact Date)   SpO2 97%   BMI 32.98 kg/m²       Constitutional:  Well developed, well nourished, no acute distress, non-toxic appearance   Eyes:  PERRLA, conjunctiva without erythema.  HENT:  Atraumatic. External ears nontender to palpation. Clear effusions of bilateral TMs without erythema, purulence, or perforation. No nasal drainage. Oropharynx moist, mildly erythematous posteriorly without pharyngeal exudate.   Respiratory:  Clear to auscultation bilaterally without wheezing, rhonchi, or crackles. No tachypnea or dyspnea on exam.  Cardiovascular:  Regular rhythm and normal rate without obvious murmurs, gallops, or rubs.   Integument:  Well hydrated, no rash   Lymphatic:  Submandibular lymphadenopathy.         Labs  Results for orders placed or performed in visit on 01/21/25   POC Streptococcus Group A PCR   Result Value    STREPTOCOCCUS GROUP A PCR Not Detected    TEST LOT NUMBER [id number]    TEST LOT EXPIRATION DATE 11/30/25           ASSESSMENT & PLAN:  1. Upper respiratory infection.  Her symptoms, including sore throat, headache, cough, rhinorrhea, and chest congestion, suggest a viral upper respiratory infection. However, given her recent exposure to streptococcal infection, it is prudent to rule out this possibility. A strep swab will be conducted today. She has declined a COVID-19 test. She is advised to use her inhaler over the next few days, particularly when experiencing shortness of breath, wheezing, or persistent coughing episodes. Over-the-counter  Mucinex is recommended to help break up mucus and make it easier to cough out. If the strep test returns positive, an antibiotic prescription will be sent. If she finds herself needing to use the inhaler more than 3 to 4 times daily, or if the inhaler proves ineffective, she should contact us immediately, at which time would consider Prednisone 40mg daily x5 days. In the event of severe breathing difficulties, she is advised to return for further evaluation. Patient verbalized understanding of this care plan, and questions were answered.        Diagnosis:  1. URI with cough and congestion  - POC Streptococcus Group A PCR    2. Sore throat  - POC Streptococcus Group A PCR    3. Strep throat exposure  - POC Streptococcus Group A PCR        Follow Up:  No follow-ups on file.   Patient was instructed to return to the ED/UC immediately if symptoms worsen or any new unusual symptoms arise.      Recheck on patient. Discussed with patient UC findings and plan for discharge. Patient was given UC warnings, discharge instructions, and follow up information to go home with. Patient understands and agrees with plan for discharge. Any questions have been answered.      Closure:  Informed patient that this is a provisional diagnosis. Provisional diagnosis can and do change. The diagnosis that you are discharged with today is based on the symptoms with which you presented today. If any new symptoms occur or worsen, you should seek immediate attention for re-evaluation.  Any symptoms that persist or fail to completely resolve require further evaluation by your other healthcare provider(s).    This chart was composed by Bettie Palacio, DNP, BS, APRN, FNP-BC; collaborating physicians Dr. Yaakov Rivera and Dr. Sammy Burns.      1/21/2025, 12:16 PM.

## 2025-05-10 NOTE — PLAN OF CARE
Problem: Prexisting or High Potential for Compromised Skin Integrity  Goal: Skin integrity is maintained or improved  Description: INTERVENTIONS:- Identify patients at risk for skin breakdown- Assess and monitor skin integrity- Assess and monitor nutrition and hydration status- Monitor labs - Assess for incontinence - Turn and reposition patient- Assist with mobility/ambulation- Relieve pressure over bony prominences- Avoid friction and shearing- Provide appropriate hygiene as needed including keeping skin clean and dry- Evaluate need for skin moisturizer/barrier cream- Collaborate with interdisciplinary team - Patient/family teaching- Consider wound care consult   Outcome: Progressing     Problem: METABOLIC, FLUID AND ELECTROLYTES - ADULT  Goal: Electrolytes maintained within normal limits  Description: INTERVENTIONS:- Monitor labs and assess patient for signs and symptoms of electrolyte imbalances- Administer electrolyte replacement as ordered- Monitor response to electrolyte replacements, including repeat lab results as appropriate- Instruct patient on fluid and nutrition as appropriate  Outcome: Progressing  Goal: Fluid balance maintained  Description: INTERVENTIONS:- Monitor labs - Monitor I/O and WT- Instruct patient on fluid and nutrition as appropriate- Assess for signs & symptoms of volume excess or deficit  Outcome: Progressing     Problem: Nutrition/Hydration-ADULT  Goal: Nutrient/Hydration intake appropriate for improving, restoring or maintaining nutritional needs  Description: Monitor and assess patient's nutrition/hydration status for malnutrition. Collaborate with interdisciplinary team and initiate plan and interventions as ordered.  Monitor patient's weight and dietary intake as ordered or per policy. Utilize nutrition screening tool and intervene as necessary. Determine patient's food preferences and provide high-protein, high-caloric foods as appropriate. INTERVENTIONS:- Monitor oral intake,  urinary output, labs, and treatment plans- Assess nutrition and hydration status and recommend course of action- Evaluate amount of meals eaten- Assist patient with eating if necessary - Allow adequate time for meals- Recommend/ encourage appropriate diets, oral nutritional supplements, and vitamin/mineral supplements- Order, calculate, and assess calorie counts as needed- Recommend, monitor, and adjust tube feedings and TPN/PPN based on assessed needs- Assess need for intravenous fluids- Provide specific nutrition/hydration education as appropriate- Include patient/family/caregiver in decisions related to nutrition  Outcome: Progressing     Problem: PAIN - ADULT  Goal: Verbalizes/displays adequate comfort level or baseline comfort level  Description: Interventions:- Encourage patient to monitor pain and request assistance- Assess pain using appropriate pain scale- Administer analgesics based on type and severity of pain and evaluate response- Implement non-pharmacological measures as appropriate and evaluate response- Consider cultural and social influences on pain and pain management- Notify physician/advanced practitioner if interventions unsuccessful or patient reports new pain  Outcome: Progressing     Problem: INFECTION - ADULT  Goal: Absence or prevention of progression during hospitalization  Description: INTERVENTIONS:- Assess and monitor for signs and symptoms of infection- Monitor lab/diagnostic results- Monitor all insertion sites, i.e. indwelling lines, tubes, and drains- Monitor endotracheal if appropriate and nasal secretions for changes in amount and color- [place] appropriate cooling/warming therapies per order- Administer medications as ordered- Instruct and encourage patient and family to use good hand hygiene technique- Identify and instruct in appropriate isolation precautions for identified infection/condition  Outcome: Progressing  Goal: Absence of fever/infection during neutropenic  period  Description: INTERVENTIONS:- Monitor WBC  Outcome: Progressing     Problem: SAFETY ADULT  Goal: Patient will remain free of falls  Description: INTERVENTIONS:- Educate patient/family on patient safety including physical limitations- Instruct patient to call for assistance with activity - Consult OT/PT to assist with strengthening/mobility - Keep Call bell within reach- Keep bed low and locked with side rails adjusted as appropriate- Keep care items and personal belongings within reach- Initiate and maintain comfort rounds- Make Fall Risk Sign visible to staff- Offer Toileting every 2 Hours, in advance of need- Initiate/Maintain chair alarm- Obtain necessary fall risk management equipment: - Apply yellow socks and bracelet for high fall risk patients- Consider moving patient to room near nurses station  Outcome: Progressing  Goal: Maintain or return to baseline ADL function  Description: INTERVENTIONS:-  Assess patient's ability to carry out ADLs; assess patient's baseline for ADL function and identify physical deficits which impact ability to perform ADLs (bathing, care of mouth/teeth, toileting, grooming, dressing, etc.)- Assess/evaluate cause of self-care deficits - Assess range of motion- Assess patient's mobility; develop plan if impaired- Assess patient's need for assistive devices and provide as appropriate- Encourage maximum independence but intervene and supervise when necessary- Involve family in performance of ADLs- Assess for home care needs following discharge - Consider OT consult to assist with ADL evaluation and planning for discharge- Provide patient education as appropriate  Outcome: Progressing  Goal: Maintains/Returns to pre admission functional level  Description: INTERVENTIONS:- Perform AM-PAC 6 Click Basic Mobility/ Daily Activity assessment daily.- Set and communicate daily mobility goal to care team and patient/family/caregiver. - Collaborate with rehabilitation services on mobility  goals if consulted- Perform Range of Motion 3 times a day.- Reposition patient every 2 hours.- Dangle patient 2 times a day- Stand patient 2 times a day- Ambulate patient 2 times a day- Out of bed to chair 2 times a day - Out of bed for meals 2 times a day- Out of bed for toileting- Record patient progress and toleration of activity level   Outcome: Progressing     Problem: DISCHARGE PLANNING  Goal: Discharge to home or other facility with appropriate resources  Description: INTERVENTIONS:- Identify barriers to discharge w/patient and caregiver- Arrange for needed discharge resources and transportation as appropriate- Identify discharge learning needs (meds, wound care, etc.)- Arrange for interpretive services to assist at discharge as needed- Refer to Case Management Department for coordinating discharge planning if the patient needs post-hospital services based on physician/advanced practitioner order or complex needs related to functional status, cognitive ability, or social support system  Outcome: Progressing     Problem: Knowledge Deficit  Goal: Patient/family/caregiver demonstrates understanding of disease process, treatment plan, medications, and discharge instructions  Description: Complete learning assessment and assess knowledge base.Interventions:- Provide teaching at level of understanding- Provide teaching via preferred learning methods  Outcome: Progressing     Problem: SKIN/TISSUE INTEGRITY - ADULT  Goal: Skin Integrity remains intact(Skin Breakdown Prevention)  Description: Assess:-Perform Praful assessment every shift-Clean and moisturize skin every shift-Inspect skin when repositioning, toileting, and assisting with ADLS-Assess extremities for adequate circulation and sensation Bed Management:-Have minimal linens on bed & keep smooth, unwrinkled-Change linens as needed when moist or perspiring-Avoid sitting or lying in one position for more than 2 hours while in bed-Keep HOB at 45 degrees  Toileting:-Offer bedside commode-Assess for incontinence every shift.-Use incontinent care products after each incontinent episode such as protective barrier. Activity:-Mobilize patient 2 times a day-Encourage activity and walks on unit-Encourage or provide ROM exercises -Turn and reposition patient every 2 Hours-Use appropriate equipment to lift or move patient in bed-Instruct/ Assist with weight shifting every 2 hours  when out of bed in chair-Consider limitation of chair time 2 hour intervalsSkin Care:-Avoid use of baby powder, tape, friction and shearing, hot water or constrictive clothing-Relieve pressure over bony prominences using pillows.-Do not massage red bony areasNext Steps:-Teach patient strategies to minimize risks such as weight shifting -Consider consults to  interdisciplinary teams such as wound care team.   Outcome: Progressing  Goal: Incision(s), wounds(s) or drain site(s) healing without S/S of infection  Description: INTERVENTIONS- Assess and document dressing, incision, wound bed, drain sites and surrounding tissue- Provide patient and family education- Perform skin care/dressing changes every shift.   Outcome: Progressing     Problem: MUSCULOSKELETAL - ADULT  Goal: Maintain or return mobility to safest level of function  Description: INTERVENTIONS:- Assess patient's ability to carry out ADLs; assess patient's baseline for ADL function and identify physical deficits which impact ability to perform ADLs (bathing, care of mouth/teeth, toileting, grooming, dressing, etc.)- Assess/evaluate cause of self-care deficits - Assess range of motion- Assess patient's mobility- Assess patient's need for assistive devices and provide as appropriate- Encourage maximum independence but intervene and supervise when necessary- Involve family in performance of ADLs- Assess for home care needs following discharge - Consider OT consult to assist with ADL evaluation and planning for discharge- Provide patient  education as appropriate  Outcome: Progressing  Goal: Maintain proper alignment of affected body part  Description: INTERVENTIONS:- Support, maintain and protect limb and body alignment- Provide patient/ family with appropriate education  Outcome: Progressing

## 2025-05-10 NOTE — WOUND OSTOMY CARE
Progress Note - Wound   Naresh Carpio 64 y.o. male MRN: [id number]  Unit/Bed#: [address] [place] [address] Encounter: [id number]        Assessment:   This is a 64 year old male patient admitted on 5/8/25 with ambulatory dysfunction. Patient was AAO x 3 and not initially receptive to having wound care done then was agreeable. He stood independently from reclining chair with walker. Patient is continent of bowel and bladder.    Assessment Findings:  1-Full thickness diabetic wounds to bilateral feet and toes POA with dry eschar. Orders are in place for wound care.  2-Stage 3 pressure injury POA to mid-sacrobuttock (coccyx) - orders are in place for wound care and for prevention.  3-Unstageable pressure injury to right elbow POA - orders are in place for wound care.  4-Full thickness traumatic wounds to bilateral lower legs POA - orders are in place for wound care.  5-Bilateral heels intact - orders are in place for skin care and for prevention.    Wound Care Plan:  1-Cleanse wound to mid-sacrobuttock with NSS & pat dry. Apply Triad paste 2x/day and prn soilage/dislodgement.  2-Cleanse wound to left first toe medial, left lateral foot, left 2nd toe proximal & distal with NSS & pat dry. Apply Betadine to wounds, gauze or ABD, rolled gauze and tape. Change every other day & prn soilage/dislodgement.  3-Cleanse wound to right medial foot and right 2nd toe with NSS & pat dry. Apply Betadine to wounds, gauze or ABD, rolled gauze and tape. Change every other day & prn soilage/dislodgement.  4-Cleanse wound to bilateral lower legs with NSS & pat dry. Apply Betadine to wounds and cover with foam dressing. Change every other day & prn soilage/dislodgement.  5-Cleanse wound to right elbow with NSS & pat dry. Apply Betadine and cover with foam dressing. Change every other day & prn soilage/dislodgement.  6-Apply Prevent barrier cream to intact sacrobuttock areas BID and to heels with dressing changes every other day.  7-Float heels on 2  pillows to offload pressure so heels are not in contact with mattress or pillows.  8-Ehob pressure redistribution cushion in chair when out of bed if able. Avoid prolonged sitting.  9-Moisturize skin daily with skin nourishing cream.  10-Turn/reposition q2h or when medically stable for pressure re-distribution on skin.   11-Ambulatory referral to Wound Care placed.    Wound 02/18/25 Diabetic Ulcer Toe D1, great Left;Medial (Active)   Wound Image   05/09/25 1509   Wound Description Dry;Brown;Eschar 05/09/25 1509   Non-staged Wound Description Full thickness 05/09/25 1509   Wound Length (cm) 0.8 cm 05/09/25 1509   Wound Width (cm) 0.7 cm 05/09/25 1509   Wound Depth (cm) 0.7 cm 05/09/25 1509   Wound Surface Area (cm^2) 0.56 cm^2 05/09/25 1509   Wound Volume (cm^3) 0.392 cm^3 05/09/25 1509   Calculated Wound Volume (cm^3) 0.39 cm^3 05/09/25 1509   Change in Wound Size % -77.27 05/09/25 1509   Drainage Amount None 05/09/25 1509   Liliana-wound Assessment Intact;Dry;Scaly 05/09/25 1509   Treatments Cleansed 05/09/25 1509   Dressing Betadine;ABD;Dry dressing 05/09/25 1509   Dressing Changed New 05/09/25 1509   Dressing Status Clean;Dry;Intact 05/09/25 1509       Wound 03/24/25 Diabetic Ulcer Foot Right;Medial (Active)   Wound Image   05/09/25 1458   Wound Description Black;Eschar;Granulation tissue;Pink 05/09/25 1458   Non-staged Wound Description Full thickness 05/09/25 1458   Wound Length (cm) 0.7 cm 05/09/25 1458   Wound Width (cm) 1.6 cm 05/09/25 1458   Wound Depth (cm) 0.1 cm 05/09/25 1458   Wound Surface Area (cm^2) 1.12 cm^2 05/09/25 1458   Wound Volume (cm^3) 0.112 cm^3 05/09/25 1458   Calculated Wound Volume (cm^3) 0.11 cm^3 05/09/25 1458   Change in Wound Size % -10 05/09/25 1458   Drainage Amount Moderate 05/09/25 1458   Drainage Description Serosanguineous 05/09/25 1458   Treatments Cleansed 05/09/25 1458   Dressing Betadine;ABD;Dry dressing 05/09/25 1458   Dressing Changed New 05/09/25 1458   Dressing Status  Clean;Dry;Intact 05/09/25 1458       Wound 04/09/25 Pressure Injury Coccyx (Active)   Wound Image   05/09/25 1451   Wound Description Pink;Granulation tissue 05/09/25 1451   Pressure Injury Stage Stage 3 05/09/25 1451   Wound Length (cm) 0.3 cm 05/09/25 1451   Wound Width (cm) 0.3 cm 05/09/25 1451   Wound Depth (cm) 0.1 cm 05/09/25 1451   Wound Surface Area (cm^2) 0.09 cm^2 05/09/25 1451   Wound Volume (cm^3) 0.009 cm^3 05/09/25 1451   Calculated Wound Volume (cm^3) 0.01 cm^3 05/09/25 1451   Change in Wound Size % 85.71 05/09/25 1451   Drainage Amount None 05/09/25 1451   Liliana-wound Assessment Intact 05/09/25 1451   Treatments Cleansed 05/09/25 1451   Dressing Triad 05/09/25 1451   Dressing Changed New 05/09/25 1451   Dressing Status Clean;Intact;Dry 05/09/25 1451       Wound 05/09/25 Pressure Injury Elbow Posterior;Right (Active)   Wound Image   05/09/25 1524   Wound Description Dry;Brown;Eschar;Yellow;  Slough 05/09/25 1524   Pressure Injury Stage Unstageable 05/09/25 1524   Wound Length (cm) 0.5 cm 05/09/25 1524   Wound Width (cm) 0.7 cm 05/09/25 1524   Wound Depth (cm) 0.1 cm 05/09/25 1524   Wound Surface Area (cm^2) 0.35 cm^2 05/09/25 1524   Wound Volume (cm^3) 0.035 cm^3 05/09/25 1524   Calculated Wound Volume (cm^3) 0.04 cm^3 05/09/25 1524   Drainage Amount None 05/09/25 1524   Liliana-wound Assessment Intact;Dry;Scaly 05/09/25 1524   Treatments Cleansed 05/09/25 1524   Dressing Betadine;Foam 05/09/25 1524   Dressing Changed New 05/09/25 1524   Dressing Status Clean;Dry;Intact 05/09/25 1524       Wound 05/09/25 Traumatic Leg Left;Anterior (Active)   Wound Image   05/09/25 1513   Wound Description Dry;Black;Eschar 05/09/25 1513   Non-staged Wound Description Full thickness 05/09/25 1513   Wound Length (cm) 0.5 cm 05/09/25 1513   Wound Width (cm) 0.5 cm 05/09/25 1513   Wound Depth (cm) 0.1 cm 05/09/25 1513   Wound Surface Area (cm^2) 0.25 cm^2 05/09/25 1513   Wound Volume (cm^3) 0.025 cm^3 05/09/25 1513    Calculated Wound Volume (cm^3) 0.03 cm^3 05/09/25 1513   Drainage Amount None 05/09/25 1513   Treatments Cleansed 05/09/25 1513   Dressing Betadine;Foam 05/09/25 1513   Dressing Changed New 05/09/25 1513   Dressing Status Clean;Dry;Intact 05/09/25 1513       Wound 05/09/25 Traumatic Leg Right;Anterior (Active)   Wound Image   05/09/25 1507   Wound Description Dry;Black;Eschar 05/09/25 1507   Non-staged Wound Description Full thickness 05/09/25 1507   Wound Length (cm) 0.5 cm 05/09/25 1507   Wound Width (cm) 0.5 cm 05/09/25 1507   Wound Depth (cm) 0.1 cm 05/09/25 1507   Wound Surface Area (cm^2) 0.25 cm^2 05/09/25 1507   Wound Volume (cm^3) 0.025 cm^3 05/09/25 1507   Calculated Wound Volume (cm^3) 0.03 cm^3 05/09/25 1507   Drainage Amount None 05/09/25 1507   Liliana-wound Assessment Intact;Dry;Scaly 05/09/25 1507   Treatments Cleansed 05/09/25 1507   Dressing Betadine;Foam 05/09/25 1507   Dressing Changed New 05/09/25 1507   Dressing Status Clean;Dry;Intact 05/09/25 1507       Wound 05/09/25 Diabetic Ulcer Foot Left;Medial (Active)   Wound Image   05/09/25 1510   Wound Description Dry;Brown;Eschar 05/09/25 1510   Non-staged Wound Description Full thickness 05/09/25 1510   Wound Length (cm) 0.8 cm 05/09/25 1510   Wound Width (cm) 1 cm 05/09/25 1510   Wound Depth (cm) 0.1 cm 05/09/25 1510   Wound Surface Area (cm^2) 0.8 cm^2 05/09/25 1510   Wound Volume (cm^3) 0.08 cm^3 05/09/25 1510   Calculated Wound Volume (cm^3) 0.08 cm^3 05/09/25 1510   Drainage Amount None 05/09/25 1510   Liliana-wound Assessment Intact;Dry;Scaly 05/09/25 1510   Treatments Cleansed 05/09/25 1510   Dressing Betadine;ABD;Dry Dressing 05/09/25 1510   Dressing Changed New 05/09/25 1510   Dressing Status Clean;Dry;Intact 05/09/25 1510       Wound 05/09/25 Diabetic Ulcer Foot Left;Lateral (Active)   Wound Image   05/09/25 1512   Wound Description Dry;Black;Eschar 05/09/25 1512   Non-staged Wound Description Full thickness 05/09/25 1512   Wound Length (cm)  0.2 cm 05/09/25 1512   Wound Width (cm) 0.5 cm 05/09/25 1512   Wound Depth (cm) 0.1 cm 05/09/25 1512   Wound Surface Area (cm^2) 0.1 cm^2 05/09/25 1512   Wound Volume (cm^3) 0.01 cm^3 05/09/25 1512   Calculated Wound Volume (cm^3) 0.01 cm^3 05/09/25 1512   Liliana-wound Assessment Intact;Dry;Scaly 05/09/25 1512   Treatments Cleansed 05/09/25 1512   Dressing Betadine;ABD;Dry dressing 05/09/25 1512   Dressing Changed New 05/09/25 1512   Dressing Status Clean;Dry;Intact 05/09/25 1512       Wound 05/09/25 Diabetic Ulcer Toe D2, second Anterior;Right (Active)   Wound Image   05/09/25 1505   Wound Description Dry;Black;Eschar 05/09/25 1505   Non-staged Wound Description Full thickness 05/09/25 1505   Wound Length (cm) 0.3 cm 05/09/25 1505   Wound Width (cm) 0.3 cm 05/09/25 1505   Wound Depth (cm) 0.1 cm 05/09/25 1505   Wound Surface Area (cm^2) 0.09 cm^2 05/09/25 1505   Wound Volume (cm^3) 0.009 cm^3 05/09/25 1505   Calculated Wound Volume (cm^3) 0.01 cm^3 05/09/25 1505   Drainage Amount None 05/09/25 1505   Liliana-wound Assessment Purple;Dry;Scaly 05/09/25 1505   Treatments Cleansed 05/09/25 1505   Dressing Betadine;ABD;Dry dressing 05/09/25 1505   Dressing Changed New 05/09/25 1505   Dressing Status Clean;Dry;Intact 05/09/25 1505       Wound 05/09/25 Diabetic Ulcer Toe D2, second Anterior;Left (Active)   Wound Image   05/09/25 1509   Wound Description Dry;Black;Eschar 05/09/25 1509   Non-staged Wound Description Full thickness 05/09/25 1509   Wound Length (cm) 2 cm 05/09/25 1509   Wound Width (cm) 1 cm 05/09/25 1509   Wound Depth (cm) 0.1 cm 05/09/25 1509   Wound Surface Area (cm^2) 2 cm^2 05/09/25 1509   Wound Volume (cm^3) 0.2 cm^3 05/09/25 1509   Calculated Wound Volume (cm^3) 0.2 cm^3 05/09/25 1509   Drainage Amount None 05/09/25 1509   Liliana-wound Assessment Intact;Dry;Scaly 05/09/25 1509   Treatments Cleansed 05/09/25 1509   Dressing Betadine;ABD;Dry dressing 05/09/25 1509   Dressing Changed New 05/09/25 1509   Dressing  Status Clean;Dry;Intact 05/09/25 [id number]     Discussed assessment findings, and plan of care/recommendations with Patricia RAWLS.    Wound care will follow along with patient weekly, please call or text with questions and concerns.    Recommendations written as orders.  Love Andrew MSN, RN, CWON

## 2025-05-10 NOTE — ASSESSMENT & PLAN NOTE
TTS at [facility]  Access: Right chest wall permacath  EDW 93 kg  Last HD: 05/08  Next HD: 05/10, today

## 2025-05-10 NOTE — PLAN OF CARE
Post-Dialysis RN Treatment Note    Blood Pressure:  Pre 163/65 mm/Hg  Post 156/64 mmHg   EDW:  93 kg    Weight:  Pre 90.5 kg   Post 90.3 kg   Mode of weight measurement: Standing Scale   Volume Removed:  900 ml    Treatment duration: 200 minutes    NS given:  No    Treatment shortened Yes, describe: pt c/o pain from sitting, wanted to end tx   Medications given during Rx: Epogen 5000 units   Estimated Kt/V:  Not Applicable   Access type: Permacath/TDC   Needle Gauge:  n/a   Access Issues: No    Report called to primary nurse:   Yes Robinson Espinoza RN    PRE TX: Target UF Goal  1  L as tolerated. Patient dialyzing for  3.5hr  on 3 K bath for serum K of  4.1  per protocol. Treatment plan reviewed with Nephrology.     Problem: METABOLIC, FLUID AND ELECTROLYTES - ADULT  Goal: Electrolytes maintained within normal limits  Description: INTERVENTIONS:- Monitor labs and assess patient for signs and symptoms of electrolyte imbalances- Administer electrolyte replacement as ordered- Monitor response to electrolyte replacements, including repeat lab results as appropriate- Instruct patient on fluid and nutrition as appropriate  Outcome: Progressing     Problem: METABOLIC, FLUID AND ELECTROLYTES - ADULT  Goal: Fluid balance maintained  Description: INTERVENTIONS:- Monitor labs - Monitor I/O and WT- Instruct patient on fluid and nutrition as appropriate- Assess for signs & symptoms of volume excess or deficit  Outcome: Progressing

## 2025-05-10 NOTE — ASSESSMENT & PLAN NOTE
Presented status post mechanical fall with complaints of lower back pain and finding of a L2 compression fracture   CT spine lumbar showed stable obliquely oriented fracture extending through the L2 vertebral body without retropulsion or extension of the posterior elements  Encourage compliance to LSO brace when not sleeping or showering  Ongoing PT/OT -> patient now agreeable to another course of skilled rehab (awaiting placement)  PRN pain control

## 2025-05-11 ENCOUNTER — APPOINTMENT (INPATIENT)
Dept: RADIOLOGY | Facility: HOSPITAL | Age: 65
DRG: 555 | End: 2025-05-11
Payer: MEDICARE

## 2025-05-11 LAB
ANION GAP SERPL CALCULATED.3IONS-SCNC: 12 MMOL/L (ref 4–13)
BUN SERPL-MCNC: 21 MG/DL (ref 5–25)
CALCIUM SERPL-MCNC: 8.6 MG/DL (ref 8.4–10.2)
CHLORIDE SERPL-SCNC: 94 MMOL/L (ref 96–108)
CO2 SERPL-SCNC: 23 MMOL/L (ref 21–32)
CREAT SERPL-MCNC: 3.9 MG/DL (ref 0.6–1.3)
GFR SERPL CREATININE-BSD FRML MDRD: 15 ML/MIN/1.73SQ M
GLUCOSE SERPL-MCNC: 87 MG/DL (ref 65–140)
HCT VFR BLD AUTO: 27.5 % (ref 36.5–49.3)
HGB BLD-MCNC: 8.5 G/DL (ref 12–17)
POTASSIUM SERPL-SCNC: 4.1 MMOL/L (ref 3.5–5.3)
SODIUM SERPL-SCNC: 129 MMOL/L (ref 135–147)

## 2025-05-11 PROCEDURE — 85014 HEMATOCRIT: CPT | Performed by: INTERNAL MEDICINE

## 2025-05-11 PROCEDURE — 74176 CT ABD & PELVIS W/O CONTRAST: CPT

## 2025-05-11 PROCEDURE — 80048 BASIC METABOLIC PNL TOTAL CA: CPT | Performed by: INTERNAL MEDICINE

## 2025-05-11 PROCEDURE — 85018 HEMOGLOBIN: CPT | Performed by: INTERNAL MEDICINE

## 2025-05-11 PROCEDURE — 99232 SBSQ HOSP IP/OBS MODERATE 35: CPT | Performed by: INTERNAL MEDICINE

## 2025-05-11 RX ORDER — HYDROMORPHONE HCL/PF 1 MG/ML
0.5 SYRINGE (ML) INJECTION ONCE
Status: COMPLETED | OUTPATIENT
Start: 2025-05-11 | End: 2025-05-11

## 2025-05-11 RX ORDER — HYDROMORPHONE HCL IN WATER/PF 6 MG/30 ML
0.2 PATIENT CONTROLLED ANALGESIA SYRINGE INTRAVENOUS
Status: DISCONTINUED | OUTPATIENT
Start: 2025-05-11 | End: 2025-05-13 | Stop reason: HOSPADM

## 2025-05-11 RX ADMIN — HYDROMORPHONE HYDROCHLORIDE 0.2 MG: 0.2 INJECTION, SOLUTION INTRAMUSCULAR; INTRAVENOUS; SUBCUTANEOUS at 02:36

## 2025-05-11 RX ADMIN — SEVELAMER HYDROCHLORIDE 1600 MG: 800 TABLET, FILM COATED ORAL at 08:20

## 2025-05-11 RX ADMIN — HYDROMORPHONE HYDROCHLORIDE 0.5 MG: 1 INJECTION, SOLUTION INTRAMUSCULAR; INTRAVENOUS; SUBCUTANEOUS at 17:01

## 2025-05-11 RX ADMIN — ACETAMINOPHEN 975 MG: 325 TABLET, FILM COATED ORAL at 22:52

## 2025-05-11 RX ADMIN — DOCUSATE SODIUM 100 MG: 100 CAPSULE, LIQUID FILLED ORAL at 09:41

## 2025-05-11 RX ADMIN — Medication 6 MG: at 22:52

## 2025-05-11 RX ADMIN — ACETAMINOPHEN 975 MG: 325 TABLET, FILM COATED ORAL at 15:06

## 2025-05-11 RX ADMIN — HYDROMORPHONE HYDROCHLORIDE 0.2 MG: 0.2 INJECTION, SOLUTION INTRAMUSCULAR; INTRAVENOUS; SUBCUTANEOUS at 07:23

## 2025-05-11 RX ADMIN — METHOCARBAMOL 500 MG: 500 TABLET ORAL at 22:53

## 2025-05-11 RX ADMIN — SEVELAMER HYDROCHLORIDE 1600 MG: 800 TABLET, FILM COATED ORAL at 11:36

## 2025-05-11 RX ADMIN — METHOCARBAMOL 500 MG: 500 TABLET ORAL at 05:00

## 2025-05-11 RX ADMIN — HYDROMORPHONE HYDROCHLORIDE 0.2 MG: 0.2 INJECTION, SOLUTION INTRAMUSCULAR; INTRAVENOUS; SUBCUTANEOUS at 21:04

## 2025-05-11 RX ADMIN — OXYCODONE HYDROCHLORIDE 5 MG: 5 TABLET ORAL at 01:27

## 2025-05-11 RX ADMIN — ALLOPURINOL 100 MG: 100 TABLET ORAL at 09:41

## 2025-05-11 RX ADMIN — DOCUSATE SODIUM 100 MG: 100 CAPSULE, LIQUID FILLED ORAL at 17:05

## 2025-05-11 RX ADMIN — LIDOCAINE 5% 2 PATCH: 700 PATCH TOPICAL at 09:42

## 2025-05-11 RX ADMIN — ACETAMINOPHEN 975 MG: 325 TABLET, FILM COATED ORAL at 05:00

## 2025-05-11 RX ADMIN — METHOCARBAMOL 500 MG: 500 TABLET ORAL at 15:07

## 2025-05-11 RX ADMIN — OXYCODONE HYDROCHLORIDE 5 MG: 5 TABLET ORAL at 19:42

## 2025-05-11 RX ADMIN — HYDROMORPHONE HYDROCHLORIDE 0.2 MG: 0.2 INJECTION, SOLUTION INTRAMUSCULAR; INTRAVENOUS; SUBCUTANEOUS at 11:35

## 2025-05-11 RX ADMIN — OXYCODONE HYDROCHLORIDE 5 MG: 5 TABLET ORAL at 09:39

## 2025-05-11 RX ADMIN — NIFEDIPINE 30 MG: 30 TABLET, EXTENDED RELEASE ORAL at 17:05

## 2025-05-11 RX ADMIN — ATORVASTATIN CALCIUM 80 MG: 40 TABLET, FILM COATED ORAL at 17:05

## 2025-05-11 RX ADMIN — SEVELAMER HYDROCHLORIDE 1600 MG: 800 TABLET, FILM COATED ORAL at 17:05

## 2025-05-11 NOTE — PROGRESS NOTES
Progress Note - Hospitalist   Name: Naresh Carpio 64 y.o. male I MRN: [id number]  Unit/Bed#: [address] [address]-01 I Date of Admission: 5/8/2025   Date of Service: 5/11/2025 I Hospital Day: 2    Assessment & Plan  Ambulatory dysfunction  Presented status post mechanical fall with complaints of lower back pain and finding of a L2 compression fracture   CT spine lumbar showed stable obliquely oriented fracture extending through the L2 vertebral body without retropulsion or extension of the posterior elements  Encourage compliance to LSO brace when not sleeping or showering  Ongoing PT/OT -> patient now agreeable to another course of skilled rehab (awaiting placement)  PRN pain control  Due to recent retroperitoneal hemorrhage on fall last month and waxing/waning abdominal discomfort, will check repeat CT of abdomen/pelvis to ensure continued stabilization  ESRD (end stage renal disease) (HCC)  Routine hemodialysis per nephrology  On Renagel supplementation  Chronic diastolic (congestive) heart failure (HCC)  Fluid management via dialysis  Fluid restriction enforced  Monitor/replete electrolyte deficiencies, if present  Essential hypertension  Continue Procardia  Chronic anemia  In the setting of ESRD - continue Epogen w/ dialysis sessions  H/H stable  Hyponatremia  Managed via dialysis  Retinopathy  Seen by ophthalmology last month due to floaters and blurry vision  Proliferative retinopathy with vitreous hemorrhage OU  Ophthalmology recommended OP follow-up immediately after discharge for ongoing management and referral to a retinal specialist  CT of head with increased density within the right globe is unchanged from the examination performed 5/3/2025 but new compared to a prior MRI dated 10/9/2024 - possibly related to retinal detachment  Previous provider discussed with ophthalmology, Dr. Chaudhry, who recommended ambulatory referral to the [facility] eye clinic ([phone]) for retina specialist  appointment  Paroxysmal atrial fibrillation (HCC)  Has previously declined anticoagulation due to recent hospitalization for traumatic retroperitoneal hematoma  Benefits and risk discussed with the patient, by previous provider  Rate controlled off agents      VTE Pharmacologic Prophylaxis: VTE Score: 4 Moderate Risk (Score 3-4) - Pharmacological DVT Prophylaxis Contraindicated. Sequential Compression Devices Ordered.    AM-PAC Basic Mobility:  Basic Mobility Inpatient Raw Score: 18  JH-HLM Goal: 6: Walk 10 steps or more  JH-HLM Achieved: 6: Walk 10 steps or more  JH-HLM Goal NOT achieved. Continue with multidisciplinary rounding and encourage appropriate mobility to improve upon JH-HLM goals.    Patient Centered Rounds:  I have performed bedside rounds and discussed plan of care with nursing today.  Discussions with Specialists or Other Care Team Provider:  see above assessments if applicable    Education and Discussions with Family / Patient: Patient at bedside, who will self update sister today - discussed personally with sister, over the phone, yesterday, who was agreeable to rehab placement    Time Spent for Care:  35 minutes. More than 50% of total time spent on counseling and coordination of care, on one or more of the following: performing physical exam; counseling and coordination of care, obtaining or reviewing history, documenting in the medical record, reviewing/ordering tests/medications/procedures, and communicating with other healthcare professionals.    Current Length of Stay: 2 day(s)  Current Patient Status: Inpatient   Certification Statement:  Patient will continue to require additional hospital stay due to assessments as noted above.    Code Status: Level 1 - Full Code      Subjective     Encountered earlier today.  Continues to sit upright in a chair and remains compliant with back brace.  Denies new complaints this time.  Overall, remains nervous about general medical condition and expresses  understanding for need for skilled rehab.      Objective     Vitals:   Temp (24hrs), Av.4 °F (36.9 °C), Min:97.9 °F (36.6 °C), Max:98.7 °F (37.1 °C)    Temp:  [97.9 °F (36.6 °C)-98.7 °F (37.1 °C)] 98.6 °F (37 °C)  HR:  [64-79] 73  Resp:  [14-18] 18  BP: (131-167)/(53-64) 148/62  SpO2:  [95 %-100 %] 99 %  Body mass index is 26.52 kg/m².     Input and Output Summary (last 24 hours):       Intake/Output Summary (Last 24 hours) at 2025 1607  Last data filed at 2025 1301  Gross per 24 hour   Intake 1140 ml   Output 1460 ml   Net -320 ml       Physical Exam:     GENERAL Waxing/waning weakness/fatigue   HEAD Normocephalic/atraumatic   EYES Nonicteric   MOUTH   Mucosa moist   NECK   Supple - full range of motion   CARDIAC Rate controlled   PULMONARY Clear bilateral breath sounds without labored respirations currently   ABDOMEN Nondistended/nontender   MUSCULOSKELETAL   Motor strength/range of motion deconditioned - back brace in place - bilateral distal LE edema present with hyperpigmentation changes   NEUROLOGIC Alert/oriented at baseline   PSYCHIATRIC   Mood/affect mildly anxious         Labs & Recent Cultures:  Results from last 7 days   Lab Units 25  0504 05/10/25  0433 25  0414 25  0749   WBC Thousand/uL  --  5.37   < > 8.16   HEMOGLOBIN g/dL 8.5* 8.5*   < > 9.4*   HEMATOCRIT % 27.5* 27.2*   < > 30.2*   PLATELETS Thousands/uL  --  210   < > 231   SEGS PCT %  --   --   --  74   LYMPHO PCT %  --   --   --  13*   MONO PCT %  --   --   --  9   EOS PCT %  --   --   --  3    < > = values in this interval not displayed.     Results from last 7 days   Lab Units 25  0504   POTASSIUM mmol/L 4.1   CHLORIDE mmol/L 94*   CO2 mmol/L 23   BUN mg/dL 21   CREATININE mg/dL 3.90*   CALCIUM mg/dL 8.6                                 Lines/Drains/Telemetry:  Invasive Devices       Peripheral Intravenous Line  Duration             Peripheral IV 05/10/25 Left;Ventral (anterior) Forearm <1 day               Hemodialysis Catheter  Duration             HD Permanent Double Catheter 620 days                    Last 24 Hours Medication List:   Current Facility-Administered Medications   Medication Dose Route Frequency Provider Last Rate    acetaminophen  650 mg Oral Q6H PRN Devante Shah MD      acetaminophen  975 mg Oral Q8H [facility] Devante Shah MD      allopurinol  100 mg Oral Daily Devante hSah MD      atorvastatin  80 mg Oral Daily With Dinner Devante Shah MD      docusate sodium  100 mg Oral BID Devante Shah MD      epoetin jessica  3,000 Units Subcutaneous Once per day on Tuesday Thursday Saturday German Linton MD      And    epoetin jessica  2,000 Units Subcutaneous Once per day on Tuesday Thursday Saturday German Linton MD      gabapentin  100 mg Oral Once per day on Monday Wednesday Friday Devante Shah MD      HYDROmorphone  0.2 mg Intravenous Q4H PRN GRANT Rich      lidocaine  2 patch Topical Daily Devante Shah MD      melatonin  6 mg Oral HS Devante Shah MD      methocarbamol  500 mg Oral TID PRN Devante Shah MD      NIFEdipine  30 mg Oral After Dinner Devante Shah MD      ondansetron  4 mg Oral Q6H PRN Devante Shah MD      oxyCODONE  5 mg Oral Q8H PRN Devante Shah MD      polyethylene glycol  17 g Oral Daily PRN Devante Shah MD      sevelamer  1,600 mg Oral TID With Meals Devante Shah MD      white petrolatum-mineral oil   Topical TID PRN MD JAMIN Luque MD   Hospitalist - [facility] Internal Medicine        ** Please Note:  Documentation is constructed using a voice recognition dictation system.  An occasional wrong word/phrase or “sound-a-like” substitution may have been picked up by dictation device due to the inherent limitations of voice recognition software.  Read the chart carefully and recognize, using reasonable context, where substitutions may have occurred.**

## 2025-05-11 NOTE — CASE MANAGEMENT
Case Management Discharge Planning Note    Patient name Naresh Carpio  Location [address] [facility]/[address] [facility] 414-* MRN [id number]  : 1960 Date 2025       Current Admission Date: 2025  Current Admission Diagnosis:Ambulatory dysfunction   Patient Active Problem List    Diagnosis Date Noted Date Diagnosed    Ambulatory dysfunction 2025     Retinopathy 2025     At risk for acid-base imbalance 2025     Electrolyte imbalance risk 2025     Hallucinations 2025     Type 2 diabetes mellitus with foot ulcer, without long-term current use of insulin ([facility]) 2025     Insomnia 2025     Hyperphosphatemia 2025     Wound of skin 2025     At high risk for skin breakdown 2025     At risk for venous thromboembolism (VTE) 2025     Impaired mobility and activities of daily living 2025     Visual disturbance 2025     Pseudoaneurysm ([facility]) 2025     Acute pain due to trauma 2025     ESRD on dialysis ([facility]) 2025     Wounds, multiple 2025     Traumatic retroperitoneal hemorrhage 2025     Secondary hyperparathyroidism ([facility]) 2025     At risk for electrolyte imbalance 2025     Multiple open wounds of foot 2025     Disorder of acid-base balance 2025     L2 vertebral fracture ([facility]) 2025     Non-compliance with treatment 2025     Generalized weakness 2025     Gout 2025     Closed fracture of proximal end of left humerus with routine healing 02/10/2025     Left shoulder pain 2025     ESRD (end stage renal disease) on dialysis ([facility]) 2025     Primary hypertension 2025     Anemia in ESRD (end-stage renal disease)  ([facility]) 2025     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, on chronic dialysis ([facility]) 2025     Renal lesion 2024     Chronic wound 2024     Chronic diastolic (congestive) heart failure ([facility])  11/19/2024     Pleural effusion 11/19/2024     Paroxysmal atrial fibrillation (HCC)      ESRD (end stage renal disease) ([facility]) 10/25/2024     Hyponatremia 10/19/2024     Acute on chronic anemia 10/14/2024     PAD (peripheral artery disease) ([facility]) 10/08/2024     Hyperkalemia 04/06/2024     Difficulty with speech 03/19/2024     History of amputation of hallux ([facility]) 03/18/2024     At risk for constipation 09/06/2023     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 mellitus, with necrosis of bone ([facility])      Acquired deformity of foot, right      Charcot's joint      Open wound of right foot 08/21/2023     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 mellitus, with bone involvement without evidence of necrosis ([facility])      Diabetic ulcer of left midfoot associated with type 2 diabetes mellitus, limited to breakdown of skin ([facility])      Diabetic polyneuropathy associated with type 2 diabetes mellitus ([facility])      Diabetes mellitus type 2 with peripheral artery disease ([facility])      History of amputation of lesser toe of left foot ([facility])      Onychomycosis      Traumatic retroperitoneal hematoma 08/19/2023     Osteoarthritis of left hip 07/27/2022     Severe Left Orchalgia 07/26/2022     Right shoulder pain 07/26/2022     Left hip pain 07/06/2022     Hemodialysis status ([facility])      Hypervolemia      Chronic anemia 01/21/2022     History of prediabetes      Essential hypertension      History of TIA (transient ischemic attack)      T10 vertebral fracture ([facility])        LOS (days): 2  Geometric Mean LOS (GMLOS) (days): 3.8  Days to GMLOS:1.9     OBJECTIVE:  Risk of Unplanned Readmission Score: 62.29     Current admission status: Inpatient   Preferred Pharmacy:   [facility] - [place], [place] - [address]  [facility] [address]  Phone: [phone] Fax: [phone]    [id number] - [place], [place] - [address]  [address]  [address]  [facility] [address]  Phone: [phone] Fax: [phone]    Primary Care Provider: Jeffrey Jackson    Primary Insurance: MEDICARE  Secondary Insurance:     DISCHARGE DETAILS:    Discharge planning discussed with:: Sister  Freedom of Choice: Yes  Comments - Freedom of Choice: SW following to assist with planning.  Yesterday pt was agreeable with STR placement and referrals were made.  SW spoke with pt's sister today to review plans and discuss care options.  Sister was happy that pt was considering rehab. SW talked with sister about applying to Medicaid/MLTSS for home services as well as long term placement coverage. SW inquired about pt's local address for EARC-PAS purposes.  Sister shared that pt stays between her place and  her 's.  She provided her 's address for the EARC-PAS. Sister said that due to medical issues pt does not go back to [place] often.  Referrals are still being considered by referral sources.  [facility] today [facility]  SW will follow to monitor progress and assist as needed.  CM contacted family/caregiver?: Yes  Were Treatment Team discharge recommendations reviewed with patient/caregiver?: Yes  Did patient/caregiver verbalize understanding of patient care needs?: Yes    Contacts  Patient Contacts: Alicja Carpio (Sister)  Relationship to Patient:: Family  Contact Method: Phone  Reason/Outcome: Discharge Planning    Other Referral/Resources/Interventions Provided:  Interventions: Short Term Rehab    Treatment Team Recommendation: Short Term Rehab  Discharge Destination Plan:: Short Term Rehab  Transport at Discharge : Wheelchair van

## 2025-05-11 NOTE — ASSESSMENT & PLAN NOTE
Seen by ophthalmology last month due to floaters and blurry vision  Proliferative retinopathy with vitreous hemorrhage OU  Ophthalmology recommended OP follow-up immediately after discharge for ongoing management and referral to a retinal specialist  CT of head with increased density within the right globe is unchanged from the examination performed 5/3/2025 but new compared to a prior MRI dated 10/9/2024 - possibly related to retinal detachment  Previous provider discussed with ophthalmology, Dr. Chaudhry, who recommended ambulatory referral to the [facility] eye clinic ([phone]) for retina specialist appointment

## 2025-05-11 NOTE — PLAN OF CARE
Problem: Prexisting or High Potential for Compromised Skin Integrity  Goal: Skin integrity is maintained or improved  Description: INTERVENTIONS:- Identify patients at risk for skin breakdown- Assess and monitor skin integrity- Assess and monitor nutrition and hydration status- Monitor labs - Assess for incontinence - Turn and reposition patient- Assist with mobility/ambulation- Relieve pressure over bony prominences- Avoid friction and shearing- Provide appropriate hygiene as needed including keeping skin clean and dry- Evaluate need for skin moisturizer/barrier cream- Collaborate with interdisciplinary team - Patient/family teaching- Consider wound care consult   Outcome: Progressing     Problem: METABOLIC, FLUID AND ELECTROLYTES - ADULT  Goal: Electrolytes maintained within normal limits  Description: INTERVENTIONS:- Monitor labs and assess patient for signs and symptoms of electrolyte imbalances- Administer electrolyte replacement as ordered- Monitor response to electrolyte replacements, including repeat lab results as appropriate- Instruct patient on fluid and nutrition as appropriate  Outcome: Progressing  Goal: Fluid balance maintained  Description: INTERVENTIONS:- Monitor labs - Monitor I/O and WT- Instruct patient on fluid and nutrition as appropriate- Assess for signs & symptoms of volume excess or deficit  Outcome: Progressing     Problem: Nutrition/Hydration-ADULT  Goal: Nutrient/Hydration intake appropriate for improving, restoring or maintaining nutritional needs  Description: Monitor and assess patient's nutrition/hydration status for malnutrition. Collaborate with interdisciplinary team and initiate plan and interventions as ordered.  Monitor patient's weight and dietary intake as ordered or per policy. Utilize nutrition screening tool and intervene as necessary. Determine patient's food preferences and provide high-protein, high-caloric foods as appropriate. INTERVENTIONS:- Monitor oral intake,  urinary output, labs, and treatment plans- Assess nutrition and hydration status and recommend course of action- Evaluate amount of meals eaten- Assist patient with eating if necessary - Allow adequate time for meals- Recommend/ encourage appropriate diets, oral nutritional supplements, and vitamin/mineral supplements- Order, calculate, and assess calorie counts as needed- Recommend, monitor, and adjust tube feedings and TPN/PPN based on assessed needs- Assess need for intravenous fluids- Provide specific nutrition/hydration education as appropriate- Include patient/family/caregiver in decisions related to nutrition  Outcome: Progressing     Problem: PAIN - ADULT  Goal: Verbalizes/displays adequate comfort level or baseline comfort level  Description: Interventions:- Encourage patient to monitor pain and request assistance- Assess pain using appropriate pain scale- Administer analgesics based on type and severity of pain and evaluate response- Implement non-pharmacological measures as appropriate and evaluate response- Consider cultural and social influences on pain and pain management- Notify physician/advanced practitioner if interventions unsuccessful or patient reports new pain  Outcome: Progressing     Problem: INFECTION - ADULT  Goal: Absence or prevention of progression during hospitalization  Description: INTERVENTIONS:- Assess and monitor for signs and symptoms of infection- Monitor lab/diagnostic results- Monitor all insertion sites, i.e. indwelling lines, tubes, and drains- Monitor endotracheal if appropriate and nasal secretions for changes in amount and color- Bacova appropriate cooling/warming therapies per order- Administer medications as ordered- Instruct and encourage patient and family to use good hand hygiene technique- Identify and instruct in appropriate isolation precautions for identified infection/condition  Outcome: Progressing  Goal: Absence of fever/infection during neutropenic  period  Description: INTERVENTIONS:- Monitor WBC  Outcome: Progressing     Problem: SAFETY ADULT  Goal: Patient will remain free of falls  Description: INTERVENTIONS:- Educate patient/family on patient safety including physical limitations- Instruct patient to call for assistance with activity - Consult OT/PT to assist with strengthening/mobility - Keep Call bell within reach- Keep bed low and locked with side rails adjusted as appropriate- Keep care items and personal belongings within reach- Initiate and maintain comfort rounds- Make Fall Risk Sign visible to staff- Offer Toileting every 2 Hours, in advance of need- Initiate/Maintain chair alarm- Obtain necessary fall risk management equipment: - Apply yellow socks and bracelet for high fall risk patients- Consider moving patient to room near nurses station  Outcome: Progressing  Goal: Maintain or return to baseline ADL function  Description: INTERVENTIONS:-  Assess patient's ability to carry out ADLs; assess patient's baseline for ADL function and identify physical deficits which impact ability to perform ADLs (bathing, care of mouth/teeth, toileting, grooming, dressing, etc.)- Assess/evaluate cause of self-care deficits - Assess range of motion- Assess patient's mobility; develop plan if impaired- Assess patient's need for assistive devices and provide as appropriate- Encourage maximum independence but intervene and supervise when necessary- Involve family in performance of ADLs- Assess for home care needs following discharge - Consider OT consult to assist with ADL evaluation and planning for discharge- Provide patient education as appropriate  Outcome: Progressing  Goal: Maintains/Returns to pre admission functional level  Description: INTERVENTIONS:- Perform AM-PAC 6 Click Basic Mobility/ Daily Activity assessment daily.- Set and communicate daily mobility goal to care team and patient/family/caregiver. - Collaborate with rehabilitation services on mobility  goals if consulted- Perform Range of Motion 3 times a day.- Reposition patient every 2 hours.- Dangle patient 3 times a day- Stand patient 3 times a day- Ambulate patient 3 times a day- Out of bed to chair 3 times a day - Out of bed for meals 3  times a day- Out of bed for toileting- Record patient progress and toleration of activity level   Outcome: Progressing     Problem: DISCHARGE PLANNING  Goal: Discharge to home or other facility with appropriate resources  Description: INTERVENTIONS:- Identify barriers to discharge w/patient and caregiver- Arrange for needed discharge resources and transportation as appropriate- Identify discharge learning needs (meds, wound care, etc.)- Arrange for interpretive services to assist at discharge as needed- Refer to Case Management Department for coordinating discharge planning if the patient needs post-hospital services based on physician/advanced practitioner order or complex needs related to functional status, cognitive ability, or social support system  Outcome: Progressing     Problem: Knowledge Deficit  Goal: Patient/family/caregiver demonstrates understanding of disease process, treatment plan, medications, and discharge instructions  Description: Complete learning assessment and assess knowledge base.Interventions:- Provide teaching at level of understanding- Provide teaching via preferred learning methods  Outcome: Progressing     Problem: SKIN/TISSUE INTEGRITY - ADULT  Goal: Skin Integrity remains intact(Skin Breakdown Prevention)  Description: Assess:-Perform Praful assessment every shift assisting with ADLS-Assess under medical devices such as back brace every shift. -Assess extremities for adequate circulation and sensation Bed Management:-Have minimal linens on bed & keep smooth, unwrinkled-Change linens as needed when moist or perspiring-Avoid sitting or lying in one position for more than 2 hours while in bed-Keep HOB at 45 degrees Toileting:-Offer bedside  commode-Assess for incontinence every shift-Use incontinent care products after each incontinent episode such as protective barrier. Activity:-Mobilize patient 3 times a day-Encourage activity and walks on unit-Encourage or provide ROM exercises -Turn and reposition patient every 2 Hours-Use appropriate equipment to lift or move patient in bed-Instruct/ Assist with weight shifting every 2 hours when out of bed in chair-Consider limitation of chair time 2 hour intervalsSkin Care:-Avoid use of baby powder, tape, friction and shearing, hot water or constrictive clothing-Relieve pressure over bony prominences using pillows-Do not massage red bony areasNext Steps:-Teach patient strategies to minimize risks such as weight shifting -Consider consults to  interdisciplinary teams such as wound care team  Outcome: Progressing  Goal: Incision(s), wounds(s) or drain site(s) healing without S/S of infection  Description: INTERVENTIONS- Assess and document dressing, incision, wound bed, drain sites and surrounding tissue- Provide patient and family education- Perform skin care/dressing changes   Outcome: Progressing     Problem: MUSCULOSKELETAL - ADULT  Goal: Maintain or return mobility to safest level of function  Description: INTERVENTIONS:- Assess patient's ability to carry out ADLs; assess patient's baseline for ADL function and identify physical deficits which impact ability to perform ADLs (bathing, care of mouth/teeth, toileting, grooming, dressing, etc.)- Assess/evaluate cause of self-care deficits - Assess range of motion- Assess patient's mobility- Assess patient's need for assistive devices and provide as appropriate- Encourage maximum independence but intervene and supervise when necessary- Involve family in performance of ADLs- Assess for home care needs following discharge - Consider OT consult to assist with ADL evaluation and planning for discharge- Provide patient education as appropriate  Outcome:  Progressing  Goal: Maintain proper alignment of affected body part  Description: INTERVENTIONS:- Support, maintain and protect limb and body alignment- Provide patient/ family with appropriate education  Outcome: Progressing

## 2025-05-11 NOTE — ASSESSMENT & PLAN NOTE
Presented status post mechanical fall with complaints of lower back pain and finding of a L2 compression fracture   CT spine lumbar showed stable obliquely oriented fracture extending through the L2 vertebral body without retropulsion or extension of the posterior elements  Encourage compliance to LSO brace when not sleeping or showering  Ongoing PT/OT -> patient now agreeable to another course of skilled rehab (awaiting placement)  PRN pain control  Due to recent retroperitoneal hemorrhage on fall last month and waxing/waning abdominal discomfort, will check repeat CT of abdomen/pelvis to ensure continued stabilization

## 2025-05-11 NOTE — QUICK NOTE
Chart reviewed.  Vitals reviewed.  Labs reviewed.  Sodium 129 (chronic hyponatremia), potassium 4.1, hemoglobin 8.5.  Status post hemodialysis yesterday with 900 cc UF.  Predialysis weight 90.5 kg, postdialysis weight 90.3 kg.  EDW 93 kg.  Denies hemodialysis session will be 05/13.  Next nephrology follow-up will be tomorrow.  Please call with questions.

## 2025-05-11 NOTE — PLAN OF CARE
Problem: Prexisting or High Potential for Compromised Skin Integrity  Goal: Skin integrity is maintained or improved  Description: INTERVENTIONS:- Identify patients at risk for skin breakdown- Assess and monitor skin integrity- Assess and monitor nutrition and hydration status- Monitor labs - Assess for incontinence - Turn and reposition patient- Assist with mobility/ambulation- Relieve pressure over bony prominences- Avoid friction and shearing- Provide appropriate hygiene as needed including keeping skin clean and dry- Evaluate need for skin moisturizer/barrier cream- Collaborate with interdisciplinary team - Patient/family teaching- Consider wound care consult   Outcome: Progressing     Problem: METABOLIC, FLUID AND ELECTROLYTES - ADULT  Goal: Electrolytes maintained within normal limits  Description: INTERVENTIONS:- Monitor labs and assess patient for signs and symptoms of electrolyte imbalances- Administer electrolyte replacement as ordered- Monitor response to electrolyte replacements, including repeat lab results as appropriate- Instruct patient on fluid and nutrition as appropriate  Outcome: Progressing  Goal: Fluid balance maintained  Description: INTERVENTIONS:- Monitor labs - Monitor I/O and WT- Instruct patient on fluid and nutrition as appropriate- Assess for signs & symptoms of volume excess or deficit  Outcome: Progressing     Problem: SKIN/TISSUE INTEGRITY - ADULT  Goal: Skin Integrity remains intact(Skin Breakdown Prevention)  Description: Assess:-Perform Praful assessment every daily Clean and moisturize skin daily -Inspect skin when repositioning, toileting, and assisting with ADLS-Assess under medical devices such as TLO brace every 12 hrs -Assess extremities for adequate circulation and sensation Bed Management:-Have minimal linens on bed & keep smooth, unwrinkled-Change linens as needed when moist or perspiring- Activity:-Mobilize patient 2 times a day-Encourage activity and walks on  unit-Encourage or provide ROM exercises -Turn and reposition patient every 2 Hours-Use appropriate equipment to lift or move patient in bed-Instruct/ Assist with weight shifting every 2 when out of bed in chair-Skin Care:-Avoid use of baby powder, tape, friction and shearing, hot water or constrictive clothing-Do not massage red bony areas  Outcome: Progressing  Goal: Incision(s), wounds(s) or drain site(s) healing without S/S of infection  Description: INTERVENTIONS- Assess and document dressing, incision, wound bed, drain sites and surrounding tissue- Provide patient and family education- Perform skin care/dressing changes every other day as ordered  Outcome: Progressing     Problem: MUSCULOSKELETAL - ADULT  Goal: Maintain or return mobility to safest level of function  Description: INTERVENTIONS:- Assess patient's ability to carry out ADLs; assess patient's baseline for ADL function and identify physical deficits which impact ability to perform ADLs (bathing, care of mouth/teeth, toileting, grooming, dressing, etc.)- Assess/evaluate cause of self-care deficits - Assess range of motion- Assess patient's mobility- Assess patient's need for assistive devices and provide as appropriate- Encourage maximum independence but intervene and supervise when necessary- Involve family in performance of ADLs- Assess for home care needs following discharge - Consider OT consult to assist with ADL evaluation and planning for discharge- Provide patient education as appropriate  Outcome: Progressing  Goal: Maintain proper alignment of affected body part  Description: INTERVENTIONS:- Support, maintain and protect limb and body alignment- Provide patient/ family with appropriate education  Outcome: Progressing     Problem: Nutrition/Hydration-ADULT  Goal: Nutrient/Hydration intake appropriate for improving, restoring or maintaining nutritional needs  Description: Monitor and assess patient's nutrition/hydration status for  malnutrition. Collaborate with interdisciplinary team and initiate plan and interventions as ordered.  Monitor patient's weight and dietary intake as ordered or per policy. Utilize nutrition screening tool and intervene as necessary. Determine patient's food preferences and provide high-protein, high-caloric foods as appropriate. INTERVENTIONS:- Monitor oral intake, urinary output, labs, and treatment plans- Assess nutrition and hydration status and recommend course of action- Evaluate amount of meals eaten- Assist patient with eating if necessary - Allow adequate time for meals- Recommend/ encourage appropriate diets, oral nutritional supplements, and vitamin/mineral supplements-   Problem: PAIN - ADULT  Goal: Verbalizes/displays adequate comfort level or baseline comfort level  Description: Interventions:- Encourage patient to monitor pain and request assistance- Assess pain using appropriate pain scale- Administer analgesics based on type and severity of pain and evaluate response- Implement non-pharmacological measures as appropriate and evaluate response- Consider cultural and social influences on pain and pain management- Notify physician/advanced practitioner if interventions unsuccessful or patient reports new pain  Outcome: Progressing     Problem: INFECTION - ADULT  Goal: Absence or prevention of progression during hospitalization  Description: INTERVENTIONS:- Assess and monitor for signs and symptoms of infection- Monitor lab/diagnostic results- Monitor all insertion sites, i.e. indwelling lines, tubes, and drains- Monitor endotracheal if appropriate and nasal secretions for changes in amount and color- [place] appropriate cooling/warming therapies per order- Administer medications as ordered- Instruct and encourage patient and family to use good hand hygiene technique- Identify and instruct in appropriate isolation precautions for identified infection/condition  Outcome: Progressing  Goal: Absence of  fever/infection during neutropenic period  Description: INTERVENTIONS:- Monitor WBC  Outcome: Progressing   Assess need for intravenous fluids- Provide specific nutrition/hydration education as appropriate- Include patient/family/caregiver in decisions related to nutrition  Outcome: Progressing     Problem: SAFETY ADULT  Goal: Patient will remain free of falls  Description: INTERVENTIONS:- Educate patient/family on patient safety including physical limitations- Instruct patient to call for assistance with activity - Consult OT/PT to assist with strengthening/mobility - Keep Call bell within reach- Keep bed low and locked with side rails adjusted as appropriate- Keep care items and personal belongings within reach- Initiate and maintain comfort rounds- Make Fall Risk Sign visible to staff- Offer Toileting every 2 Hours, in advance of need- Initiate/Maintain chair alarm-  Apply yellow socks and bracelet for high fall risk patients- Consider moving patient to room near nurses station  Outcome: Progressing  Goal: Maintain or return to baseline ADL function  Description: INTERVENTIONS:-  Assess patient's ability to carry out ADLs; assess patient's baseline for ADL function and identify physical deficits which impact ability to perform ADLs (bathing, care of mouth/teeth, toileting, grooming, dressing, etc.)- Assess/evaluate cause of self-care deficits - Assess range of motion- Assess patient's mobility; develop plan if impaired- Assess patient's need for assistive devices and provide as appropriate- Encourage maximum independence but intervene and supervise when necessary- Involve family in performance of ADLs- Assess for home care needs following discharge - Consider OT consult to assist with ADL evaluation and planning for discharge- Provide patient education as appropriate  Outcome: Progressing  Goal: Maintains/Returns to pre admission functional level  Description: INTERVENTIONS:- Perform AM-PAC 6 Click Basic  Mobility/ Daily Activity assessment daily.- Set and communicate daily mobility goal to care team and patient/family/caregiver. - Collaborate with rehabilitation services on mobility goals if consulted- Perform Range of Motion 3 times a day.- Ambulate patient 2 times a day- Out of bed to chair 3 times a day - Out of bed for meals 3 times a day- Out of bed for toileting- Record patient progress and toleration of activity level   Outcome: Progressing     Problem: DISCHARGE PLANNING  Goal: Discharge to home or other facility with appropriate resources  Description: INTERVENTIONS:- Identify barriers to discharge w/patient and caregiver- Arrange for needed discharge resources and transportation as appropriate- Identify discharge learning needs (meds, wound care, etc.)- Arrange for interpretive services to assist at discharge as needed- Refer to Case Management Department for coordinating discharge planning if the patient needs post-hospital services based on physician/advanced practitioner order or complex needs related to functional status, cognitive ability, or social support system  Outcome: Progressing     Problem: Knowledge Deficit  Goal: Patient/family/caregiver demonstrates understanding of disease process, treatment plan, medications, and discharge instructions  Description: Complete learning assessment and assess knowledge base.Interventions:- Provide teaching at level of understanding- Provide teaching via preferred learning methods  Outcome: Progressing

## 2025-05-12 LAB
ANION GAP SERPL CALCULATED.3IONS-SCNC: 12 MMOL/L (ref 4–13)
BUN SERPL-MCNC: 33 MG/DL (ref 5–25)
CALCIUM SERPL-MCNC: 8.8 MG/DL (ref 8.4–10.2)
CHLORIDE SERPL-SCNC: 95 MMOL/L (ref 96–108)
CO2 SERPL-SCNC: 23 MMOL/L (ref 21–32)
CREAT SERPL-MCNC: 5.15 MG/DL (ref 0.6–1.3)
GFR SERPL CREATININE-BSD FRML MDRD: 10 ML/MIN/1.73SQ M
GLUCOSE SERPL-MCNC: 104 MG/DL (ref 65–140)
GLUCOSE SERPL-MCNC: 148 MG/DL (ref 65–140)
HCT VFR BLD AUTO: 28.2 % (ref 36.5–49.3)
HGB BLD-MCNC: 8.6 G/DL (ref 12–17)
MAGNESIUM SERPL-MCNC: 2.1 MG/DL (ref 1.9–2.7)
PHOSPHATE SERPL-MCNC: 3.4 MG/DL (ref 2.3–4.1)
POTASSIUM SERPL-SCNC: 4.1 MMOL/L (ref 3.5–5.3)
SODIUM SERPL-SCNC: 130 MMOL/L (ref 135–147)

## 2025-05-12 PROCEDURE — 82948 REAGENT STRIP/BLOOD GLUCOSE: CPT

## 2025-05-12 PROCEDURE — 99232 SBSQ HOSP IP/OBS MODERATE 35: CPT | Performed by: INTERNAL MEDICINE

## 2025-05-12 PROCEDURE — 80048 BASIC METABOLIC PNL TOTAL CA: CPT | Performed by: INTERNAL MEDICINE

## 2025-05-12 PROCEDURE — 85014 HEMATOCRIT: CPT | Performed by: INTERNAL MEDICINE

## 2025-05-12 PROCEDURE — 84100 ASSAY OF PHOSPHORUS: CPT | Performed by: INTERNAL MEDICINE

## 2025-05-12 PROCEDURE — 99232 SBSQ HOSP IP/OBS MODERATE 35: CPT | Performed by: STUDENT IN AN ORGANIZED HEALTH CARE EDUCATION/TRAINING PROGRAM

## 2025-05-12 PROCEDURE — 97530 THERAPEUTIC ACTIVITIES: CPT

## 2025-05-12 PROCEDURE — 83735 ASSAY OF MAGNESIUM: CPT | Performed by: INTERNAL MEDICINE

## 2025-05-12 PROCEDURE — 85018 HEMOGLOBIN: CPT | Performed by: INTERNAL MEDICINE

## 2025-05-12 RX ADMIN — DOCUSATE SODIUM 100 MG: 100 CAPSULE, LIQUID FILLED ORAL at 17:17

## 2025-05-12 RX ADMIN — HYDROMORPHONE HYDROCHLORIDE 0.2 MG: 0.2 INJECTION, SOLUTION INTRAMUSCULAR; INTRAVENOUS; SUBCUTANEOUS at 03:36

## 2025-05-12 RX ADMIN — GABAPENTIN 100 MG: 100 CAPSULE ORAL at 08:01

## 2025-05-12 RX ADMIN — SEVELAMER HYDROCHLORIDE 1600 MG: 800 TABLET, FILM COATED ORAL at 12:17

## 2025-05-12 RX ADMIN — SEVELAMER HYDROCHLORIDE 1600 MG: 800 TABLET, FILM COATED ORAL at 15:50

## 2025-05-12 RX ADMIN — ACETAMINOPHEN 975 MG: 325 TABLET, FILM COATED ORAL at 05:37

## 2025-05-12 RX ADMIN — OXYCODONE HYDROCHLORIDE 5 MG: 5 TABLET ORAL at 15:12

## 2025-05-12 RX ADMIN — ALLOPURINOL 100 MG: 100 TABLET ORAL at 08:01

## 2025-05-12 RX ADMIN — ACETAMINOPHEN 975 MG: 325 TABLET, FILM COATED ORAL at 22:35

## 2025-05-12 RX ADMIN — HYDROMORPHONE HYDROCHLORIDE 0.2 MG: 0.2 INJECTION, SOLUTION INTRAMUSCULAR; INTRAVENOUS; SUBCUTANEOUS at 10:36

## 2025-05-12 RX ADMIN — OXYCODONE HYDROCHLORIDE 5 MG: 5 TABLET ORAL at 05:36

## 2025-05-12 RX ADMIN — HYDROMORPHONE HYDROCHLORIDE 0.2 MG: 0.2 INJECTION, SOLUTION INTRAMUSCULAR; INTRAVENOUS; SUBCUTANEOUS at 17:24

## 2025-05-12 RX ADMIN — NIFEDIPINE 30 MG: 30 TABLET, EXTENDED RELEASE ORAL at 17:16

## 2025-05-12 RX ADMIN — ATORVASTATIN CALCIUM 80 MG: 40 TABLET, FILM COATED ORAL at 15:50

## 2025-05-12 RX ADMIN — SEVELAMER HYDROCHLORIDE 1600 MG: 800 TABLET, FILM COATED ORAL at 07:34

## 2025-05-12 RX ADMIN — HYDROMORPHONE HYDROCHLORIDE 0.2 MG: 0.2 INJECTION, SOLUTION INTRAMUSCULAR; INTRAVENOUS; SUBCUTANEOUS at 22:35

## 2025-05-12 RX ADMIN — HYDROMORPHONE HYDROCHLORIDE 0.2 MG: 0.2 INJECTION, SOLUTION INTRAMUSCULAR; INTRAVENOUS; SUBCUTANEOUS at 00:04

## 2025-05-12 RX ADMIN — DOCUSATE SODIUM 100 MG: 100 CAPSULE, LIQUID FILLED ORAL at 08:01

## 2025-05-12 RX ADMIN — ACETAMINOPHEN 975 MG: 325 TABLET, FILM COATED ORAL at 14:31

## 2025-05-12 RX ADMIN — HYDROMORPHONE HYDROCHLORIDE 0.2 MG: 0.2 INJECTION, SOLUTION INTRAMUSCULAR; INTRAVENOUS; SUBCUTANEOUS at 07:30

## 2025-05-12 RX ADMIN — Medication 6 MG: at 22:35

## 2025-05-12 NOTE — PROGRESS NOTES
Progress Note - Hospitalist   Name: Naresh Carpio 64 y.o. male I MRN: [id number]  Unit/Bed#: [address] [place] [address]-01 I Date of Admission: 5/8/2025   Date of Service: 5/12/2025 I Hospital Day: 3    Assessment & Plan  Ambulatory dysfunction  Presented status post mechanical fall with complaints of lower back pain and finding of a L2 compression fracture   CT spine lumbar showed stable obliquely oriented fracture extending through the L2 vertebral body without retropulsion or extension of the posterior elements  Encourage compliance to LSO brace when not sleeping or showering  Ongoing PT/OT -> patient now agreeable to another course of skilled rehab (awaiting placement)  PRN pain control  Due to recent retroperitoneal hemorrhage on fall last month and waxing waning abdominal discomfort, completed repeat CT of abdomen/pelvis to ensure continued stabilization - awaiting report  ESRD (end stage renal disease) ([facility])  Routine hemodialysis per nephrology  On Renagel supplementation  Chronic diastolic (congestive) heart failure (HCC)  Fluid management via dialysis  Fluid restriction enforced  Monitor/replete electrolyte deficiencies, if present  Essential hypertension  Continue Procardia  Chronic anemia  In the setting of ESRD - continue Epogen w/ dialysis sessions  H/H stable  Hyponatremia  Managed via dialysis  Retinopathy  Seen by ophthalmology last month due to floaters and blurry vision  Proliferative retinopathy with vitreous hemorrhage OU  Ophthalmology recommended OP follow-up immediately after discharge for ongoing management and referral to a retinal specialist  CT of head with increased density within the right globe is unchanged from the examination performed 5/3/2025 but new compared to a prior MRI dated 10/9/2024 - possibly related to retinal detachment  Previous provider discussed with ophthalmology, Dr. Chaudhry, who recommended ambulatory referral to the [facility] eye clinic ([phone]) for retina specialist  appointment  Paroxysmal atrial fibrillation (HCC)  Has previously declined anticoagulation due to recent hospitalization for traumatic retroperitoneal hematoma  Benefits and risk discussed with the patient, by previous provider  Rate controlled off agents      VTE Pharmacologic Prophylaxis: VTE Score: 4 Moderate Risk (Score 3-4) - Pharmacological DVT Prophylaxis Contraindicated. Sequential Compression Devices Ordered.    AM-PAC Basic Mobility:  Basic Mobility Inpatient Raw Score: 18  JH-HLM Goal: 6: Walk 10 steps or more  JH-HLM Achieved: 6: Walk 10 steps or more  JH-HLM Goal NOT achieved. Continue with multidisciplinary rounding and encourage appropriate mobility to improve upon JH-HLM goals.    Patient Centered Rounds:  I have performed bedside rounds and discussed plan of care with nursing today.  Discussions with Specialists or Other Care Team Provider:  see above assessments if applicable    Education and Discussions with Family / Patient: Patient at bedside, who will self update sister today    Time Spent for Care:  35 minutes. More than 50% of total time spent on counseling and coordination of care, on one or more of the following: performing physical exam; counseling and coordination of care, obtaining or reviewing history, documenting in the medical record, reviewing/ordering tests/medications/procedures, and communicating with other healthcare professionals.    Current Length of Stay: 3 day(s)  Current Patient Status: Inpatient   Certification Statement:  Patient will continue to require additional hospital stay due to assessments as noted above.    Code Status: Level 1 - Full Code      Subjective     Encountered earlier this morning.  Pain is waxing/waning but without worsening at this time.  Patient hopeful to be able to undergo dialysis tomorrow, prior to discharge to rehab facility.      Objective     Vitals:   Temp (24hrs), Av.3 °F (36.8 °C), Min:98 °F (36.7 °C), Max:98.5 °F (36.9 °C)    Temp:   [98 °F (36.7 °C)-98.5 °F (36.9 °C)] 98 °F (36.7 °C)  HR:  [63-68] 63  Resp:  [16-18] 18  BP: (157-179)/(62-78) 159/62  SpO2:  [96 %-98 %] 97 %  Body mass index is 26.55 kg/m².     Input and Output Summary (last 24 hours):       Intake/Output Summary (Last 24 hours) at 5/12/2025 1334  Last data filed at 5/12/2025 1218  Gross per 24 hour   Intake 480 ml   Output 550 ml   Net -70 ml       Physical Exam:     GENERAL Remains weak/fatigued   HEAD Normocephalic/atraumatic   EYES Nonicteric   MOUTH   Mucosa moist   NECK   Supple - full range of motion   CARDIAC Rate controlled   PULMONARY Nonlabored respirations at rest   ABDOMEN Nondistended/nontender   MUSCULOSKELETAL   Motor strength/range of motion deconditioned - back brace in place - bilateral distal LE edema present with hyperpigmentation changes   NEUROLOGIC Alert/oriented at baseline   PSYCHIATRIC   Mood/affect stable currently         Labs & Recent Cultures:  Results from last 7 days   Lab Units 05/12/25  0532 05/11/25  0504 05/10/25  0433 05/09/25  0414 05/08/25  0749   WBC Thousand/uL  --   --  5.37   < > 8.16   HEMOGLOBIN g/dL 8.6*   < > 8.5*   < > 9.4*   HEMATOCRIT % 28.2*   < > 27.2*   < > 30.2*   PLATELETS Thousands/uL  --   --  210   < > 231   SEGS PCT %  --   --   --   --  74   LYMPHO PCT %  --   --   --   --  13*   MONO PCT %  --   --   --   --  9   EOS PCT %  --   --   --   --  3    < > = values in this interval not displayed.     Results from last 7 days   Lab Units 05/12/25  0532   POTASSIUM mmol/L 4.1   CHLORIDE mmol/L 95*   CO2 mmol/L 23   BUN mg/dL 33*   CREATININE mg/dL 5.15*   CALCIUM mg/dL 8.8                                 Lines/Drains/Telemetry:  Invasive Devices       Peripheral Intravenous Line  Duration             Peripheral IV 05/10/25 Left;Ventral (anterior) Forearm 1 day              Hemodialysis Catheter  Duration             HD Permanent Double Catheter 621 days                    Last 24 Hours Medication List:   Current  Facility-Administered Medications   Medication Dose Route Frequency Provider Last Rate    acetaminophen  650 mg Oral Q6H PRN Devante Shah MD      acetaminophen  975 mg Oral Q8H [facility] Devante Shah MD      allopurinol  100 mg Oral Daily Devante Shah MD      atorvastatin  80 mg Oral Daily With Dinner Devante Shah MD      docusate sodium  100 mg Oral BID Devante Shah MD      epoetin jessica  3,000 Units Subcutaneous Once per day on Tuesday Thursday Saturday German Linton MD      And    epoetin jessica  2,000 Units Subcutaneous Once per day on Tuesday Thursday Saturday German Linton MD      gabapentin  100 mg Oral Once per day on Monday Wednesday Friday Devante Shah MD      HYDROmorphone  0.2 mg Intravenous Q3H PRN GRANT Rich      lidocaine  2 patch Topical Daily Devante Shah MD      melatonin  6 mg Oral HS Devante Shah MD      methocarbamol  500 mg Oral TID PRN Devante Shah MD      NIFEdipine  30 mg Oral After Dinner Devante Shah MD      ondansetron  4 mg Oral Q6H PRN Devante Shah MD      oxyCODONE  5 mg Oral Q8H PRN Devante Shah MD      polyethylene glycol  17 g Oral Daily PRN Devante Shah MD      sevelamer  1,600 mg Oral TID With Meals Devante Shah MD      white petrolatum-mineral oil   Topical TID PRN MD JAMIN Luque MD   Hospitalist - [facility] Internal Medicine        ** Please Note:  Documentation is constructed using a voice recognition dictation system.  An occasional wrong word/phrase or “sound-a-like” substitution may have been picked up by dictation device due to the inherent limitations of voice recognition software.  Read the chart carefully and recognize, using reasonable context, where substitutions may have occurred.**

## 2025-05-12 NOTE — PLAN OF CARE
Problem: Prexisting or High Potential for Compromised Skin Integrity  Goal: Skin integrity is maintained or improved  Description: INTERVENTIONS:- Identify patients at risk for skin breakdown- Assess and monitor skin integrity- Assess and monitor nutrition and hydration status- Monitor labs - Assess for incontinence - Turn and reposition patient- Assist with mobility/ambulation- Relieve pressure over bony prominences- Avoid friction and shearing- Provide appropriate hygiene as needed including keeping skin clean and dry- Evaluate need for skin moisturizer/barrier cream- Collaborate with interdisciplinary team - Patient/family teaching- Consider wound care consult   Outcome: Progressing     Problem: METABOLIC, FLUID AND ELECTROLYTES - ADULT  Goal: Electrolytes maintained within normal limits  Description: INTERVENTIONS:- Monitor labs and assess patient for signs and symptoms of electrolyte imbalances- Administer electrolyte replacement as ordered- Monitor response to electrolyte replacements, including repeat lab results as appropriate- Instruct patient on fluid and nutrition as appropriate  Outcome: Progressing     Problem: Nutrition/Hydration-ADULT  Goal: Nutrient/Hydration intake appropriate for improving, restoring or maintaining nutritional needs  Description: Monitor and assess patient's nutrition/hydration status for malnutrition. Collaborate with interdisciplinary team and initiate plan and interventions as ordered.  Monitor patient's weight and dietary intake as ordered or per policy. Utilize nutrition screening tool and intervene as necessary. Determine patient's food preferences and provide high-protein, high-caloric foods as appropriate. INTERVENTIONS:- Monitor oral intake, urinary output, labs, and treatment plans- Assess nutrition and hydration status and recommend course of action- Evaluate amount of meals eaten- Assist patient with eating if necessary - Allow adequate time for meals- Recommend/  encourage appropriate diets, oral nutritional supplements, and vitamin/mineral supplements- Order, calculate, and assess calorie counts as needed- Recommend, monitor, and adjust tube feedings and TPN/PPN based on assessed needs- Assess need for intravenous fluids- Provide specific nutrition/hydration education as appropriate- Include patient/family/caregiver in decisions related to nutrition  Outcome: Progressing     Problem: PAIN - ADULT  Goal: Verbalizes/displays adequate comfort level or baseline comfort level  Description: Interventions:- Encourage patient to monitor pain and request assistance- Assess pain using appropriate pain scale- Administer analgesics based on type and severity of pain and evaluate response- Implement non-pharmacological measures as appropriate and evaluate response- Consider cultural and social influences on pain and pain management- Notify physician/advanced practitioner if interventions unsuccessful or patient reports new pain  Outcome: Progressing     Problem: INFECTION - ADULT  Goal: Absence or prevention of progression during hospitalization  Description: INTERVENTIONS:- Assess and monitor for signs and symptoms of infection- Monitor lab/diagnostic results- Monitor all insertion sites, i.e. indwelling lines, tubes, and drains- Monitor endotracheal if appropriate and nasal secretions for changes in amount and color- [place] appropriate cooling/warming therapies per order- Administer medications as ordered- Instruct and encourage patient and family to use good hand hygiene technique- Identify and instruct in appropriate isolation precautions for identified infection/condition  Outcome: Progressing     Problem: DISCHARGE PLANNING  Goal: Discharge to home or other facility with appropriate resources  Description: INTERVENTIONS:- Identify barriers to discharge w/patient and caregiver- Arrange for needed discharge resources and transportation as appropriate- Identify discharge learning  needs (meds, wound care, etc.)- Arrange for interpretive services to assist at discharge as needed- Refer to Case Management Department for coordinating discharge planning if the patient needs post-hospital services based on physician/advanced practitioner order or complex needs related to functional status, cognitive ability, or social support system  Outcome: Progressing     Problem: Knowledge Deficit  Goal: Patient/family/caregiver demonstrates understanding of disease process, treatment plan, medications, and discharge instructions  Description: Complete learning assessment and assess knowledge base.Interventions:- Provide teaching at level of understanding- Provide teaching via preferred learning methods  Outcome: Progressing     Problem: MUSCULOSKELETAL - ADULT  Goal: Maintain or return mobility to safest level of function  Description: INTERVENTIONS:- Assess patient's ability to carry out ADLs; assess patient's baseline for ADL function and identify physical deficits which impact ability to perform ADLs (bathing, care of mouth/teeth, toileting, grooming, dressing, etc.)- Assess/evaluate cause of self-care deficits - Assess range of motion- Assess patient's mobility- Assess patient's need for assistive devices and provide as appropriate- Encourage maximum independence but intervene and supervise when necessary- Involve family in performance of ADLs- Assess for home care needs following discharge - Consider OT consult to assist with ADL evaluation and planning for discharge- Provide patient education as appropriate  Outcome: Progressing

## 2025-05-12 NOTE — ASSESSMENT & PLAN NOTE
#ESRD on HD TTS:  [facility]days[facility]  Access: PermCath  Continue HD as per schedule, next dialysis tomorrow  Renal Diet  Fluid restriction 1-1.5L/d  Adjust medications to GFR<10  Avoid opioids

## 2025-05-12 NOTE — ASSESSMENT & PLAN NOTE
Presented status post mechanical fall with complaints of lower back pain and finding of a L2 compression fracture   CT spine lumbar showed stable obliquely oriented fracture extending through the L2 vertebral body without retropulsion or extension of the posterior elements  Encourage compliance to LSO brace when not sleeping or showering  Ongoing PT/OT -> patient now agreeable to another course of skilled rehab (awaiting placement)  PRN pain control  Due to recent retroperitoneal hemorrhage on fall last month and waxing waning abdominal discomfort, completed repeat CT of abdomen/pelvis to ensure continued stabilization - awaiting report

## 2025-05-12 NOTE — ASSESSMENT & PLAN NOTE
Seen by ophthalmology last month due to floaters and blurry vision  Proliferative retinopathy with vitreous hemorrhage OU  Ophthalmology recommended OP follow-up immediately after discharge for ongoing management and referral to a retinal specialist  CT of head with increased density within the right globe is unchanged from the examination performed 5/3/2025 but new compared to a prior MRI dated 10/9/2024 - possibly related to retinal detachment  Previous provider discussed with ophthalmology, Dr. Chaudhry, who recommended ambulatory referral to the [facility] eye clinic ([phone]) for retina specialist appointment

## 2025-05-12 NOTE — OCCUPATIONAL THERAPY NOTE
"  OT TREATMENT         05/12/25 1155   OT Last Visit   OT Visit Date 05/12/25   Note Type   Note Type Treatment   Pain Assessment   Pain Assessment Tool 0-10   Pain Score 7   Pain Location/Orientation Orientation: Lower;Location: Back   Restrictions/Precautions   Braces or Orthoses LSO  (when HOB >45 degrees or OOB)   Other Precautions Chair Alarm;Bed Alarm;Contact/isolation;Pain;Fall Risk;Visual impairment  (HD)   ADL   Toileting Comments declined need for ADLs this session   Bed Mobility   Supine to Sit Unable to assess   Sit to Supine Unable to assess   Additional Comments pt sitting OOB in chair upon OT arrival and returned to chair at end of session   Transfers   Sit to Stand 4  Minimal assistance   Additional items Assist x 1;Verbal cues;Increased time required   Stand to Sit 4  Minimal assistance   Additional items Assist x 1;Verbal cues;Increased time required   Functional Mobility   Functional Mobility 4  Minimal assistance   Additional Comments household distances within room using RW   Additional items Rolling walker   Subjective   Subjective \"Maybe we can try it now.\"   Cognition   Overall Cognitive Status WFL   Arousal/Participation Cooperative   Attention Within functional limits   Orientation Level Oriented X4   Following Commands Follows multistep commands with increased time or repetition   Activity Tolerance   Activity Tolerance Patient tolerated treatment well;Patient limited by pain;Patient limited by fatigue   Assessment   Assessment Pt seen for OT treatment session. Pt declined need for ADLs, agreeable to fxl mobility. Pt limited by pain and fatigue but motivated to participate. Pt would benefit from cont OT services to maximize safety and fxl performance of ADLs. Recommend level II moderate resource intensity when medically cleared for d/c. The patient's raw score on the AM-PAC Daily Activity Inpatient Short Form is 17. A raw score of less than 19 suggests the patient may benefit from " discharge to post-acute rehabilitation services. Please refer to the recommendation of the Occupational Therapist for safe discharge planning.   Plan   Treatment Interventions ADL retraining;Functional transfer training;UE strengthening/ROM;Endurance training;Patient/family training;Equipment evaluation/education;Activityengagement;Compensatory technique education   OT Frequency 3-5x/wk   Discharge Recommendation   Rehab Resource Intensity Level, OT II (Moderate Resource Intensity)   AM-PAC Daily Activity Inpatient   Lower Body Dressing 2   Bathing 2   Toileting 3   Upper Body Dressing 3   Grooming 3   Eating 4   Daily Activity Raw Score 17   Daily Activity Standardized Score (Calc for Raw Score >=11) 37.26   AM-PAC Applied Cognition Inpatient   Following a Speech/Presentation 4   Understanding Ordinary Conversation 4   Taking Medications 4   Remembering Where Things Are Placed or Put Away 4   Remembering List of 4-5 Errands 4   Taking Care of Complicated Tasks 4   Applied Cognition Raw Score 24   Applied Cognition Standardized Score 62.21       Niharika Rizzo OTR/L   [place] License # [id number]  [place] License # [id number]

## 2025-05-12 NOTE — CASE MANAGEMENT
Case Management Discharge Planning Note    Patient name Naresh Carpio  Location 4 [place] [address] [place] [address]-* MRN [id number]  : 1960 Date 2025       Current Admission Date: 2025  Current Admission Diagnosis:Ambulatory dysfunction   Patient Active Problem List    Diagnosis Date Noted Date Diagnosed    Ambulatory dysfunction 2025     Retinopathy 2025     At risk for acid-base imbalance 2025     Electrolyte imbalance risk 2025     Hallucinations 2025     Type 2 diabetes mellitus with foot ulcer, without long-term current use of insulin ([facility]) 2025     Insomnia 2025     Hyperphosphatemia 2025     Wound of skin 2025     At high risk for skin breakdown 2025     At risk for venous thromboembolism (VTE) 2025     Impaired mobility and activities of daily living 2025     Visual disturbance 2025     Pseudoaneurysm ([facility]) 2025     Acute pain due to trauma 2025     ESRD on dialysis ([facility]) 2025     Wounds, multiple 2025     Traumatic retroperitoneal hemorrhage 2025     Secondary hyperparathyroidism ([facility]) 2025     At risk for electrolyte imbalance 2025     Multiple open wounds of foot 2025     Disorder of acid-base balance 2025     L2 vertebral fracture ([facility]) 2025     Non-compliance with treatment 2025     Generalized weakness 2025     Gout 2025     Closed fracture of proximal end of left humerus with routine healing 02/10/2025     Left shoulder pain 2025     ESRD (end stage renal disease) on dialysis ([facility]) 2025     Primary hypertension 2025     Anemia in ESRD (end-stage renal disease)  ([facility]) 2025     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, on chronic dialysis ([facility]) 2025     Renal lesion 2024     Chronic wound 2024     Chronic diastolic (congestive) heart failure ([facility])  11/19/2024     Pleural effusion 11/19/2024     Paroxysmal atrial fibrillation (HCC)      ESRD (end stage renal disease) ([facility]) 10/25/2024     Hyponatremia 10/19/2024     Acute on chronic anemia 10/14/2024     PAD (peripheral artery disease) ([facility]) 10/08/2024     Hyperkalemia 04/06/2024     Difficulty with speech 03/19/2024     History of amputation of hallux ([facility]) 03/18/2024     At risk for constipation 09/06/2023     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 mellitus, with necrosis of bone ([facility])      Acquired deformity of foot, right      Charcot's joint      Open wound of right foot 08/21/2023     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 mellitus, with bone involvement without evidence of necrosis ([facility])      Diabetic ulcer of left midfoot associated with type 2 diabetes mellitus, limited to breakdown of skin ([facility])      Diabetic polyneuropathy associated with type 2 diabetes mellitus ([facility])      Diabetes mellitus type 2 with peripheral artery disease ([facility])      History of amputation of lesser toe of left foot ([facility])      Onychomycosis      Traumatic retroperitoneal hematoma 08/19/2023     Osteoarthritis of left hip 07/27/2022     Severe Left Orchalgia 07/26/2022     Right shoulder pain 07/26/2022     Left hip pain 07/06/2022     Hemodialysis status ([facility])      Hypervolemia      Chronic anemia 01/21/2022     History of prediabetes      Essential hypertension      History of TIA (transient ischemic attack)      T10 vertebral fracture ([facility])        LOS (days): 3  Geometric Mean LOS (GMLOS) (days): 3.8  Days to GMLOS:0.9     OBJECTIVE:  Risk of Unplanned Readmission Score: 66.43         Current admission status: Inpatient   Preferred Pharmacy:   [id number] - [place], [place] - [address]  [address]  [facility] [address]  Phone: [phone] Fax: [phone]    [id number] - [place], [place] - [address] [address]  [address] [address]  Phone: [phone] Fax: [phone]    Primary Care Provider: Jeffrey Jackson    Primary Insurance: MEDICARE  Secondary Insurance:     DISCHARGE DETAILS:    Discharge planning discussed with:: Patient, Sister Alicja  Freedom of Choice: Yes  Comments - Freedom of Choice: STR at [facility].  CM contacted family/caregiver?: Yes    Contacts  Patient Contacts: Alicja Carpio (Sister)  Relationship to Patient:: Family  Contact Method: Phone  Phone Number: [phone]  Reason/Outcome: Discharge Planning    Other Referral/Resources/Interventions Provided:  Interventions: Short Term Rehab  Referral Comments: CCL reserved in [place]. They can admit patient tomorrow after HD.     Treatment Team Recommendation: Short Term Rehab  Discharge Destination Plan:: Short Term Rehab  Transport at Discharge : Wheelchair van

## 2025-05-12 NOTE — ASSESSMENT & PLAN NOTE
Current hemoglobin:8.6mg/dL  Treatment:  EPO on HD  Transfuse for hemoglobin less than 7.0 per primary service

## 2025-05-12 NOTE — PLAN OF CARE
Problem: OCCUPATIONAL THERAPY ADULT  Goal: Performs self-care activities at highest level of function for planned discharge setting.  See evaluation for individualized goals.  Description: Treatment Interventions: ADL retraining, Functional transfer training, UE strengthening/ROM, Endurance training, Patient/family training, Equipment evaluation/education, Activityengagement, Compensatory technique education          See flowsheet documentation for full assessment, interventions and recommendations.   Outcome: Progressing  Note: Limitation: Decreased ADL status, Decreased UE strength, Decreased Safe judgement during ADL, Decreased endurance, Decreased high-level ADLs, Decreased self-care trans (decreased balance and mobility)  Prognosis: Good  Assessment: Pt seen for OT treatment session. Pt declined need for ADLs, agreeable to fxl mobility. Pt limited by pain and fatigue but motivated to participate. Pt would benefit from cont OT services to maximize safety and fxl performance of ADLs. Recommend level II moderate resource intensity when medically cleared for d/c.     Rehab Resource Intensity Level, OT: II (Moderate Resource Intensity)

## 2025-05-12 NOTE — ASSESSMENT & PLAN NOTE
Volume: Euvolemic  Blood pressure: Hypertensive, /78  Low-sodium diet  UF on HD  Continue nifedipine 30 mg daily

## 2025-05-12 NOTE — ASSESSMENT & PLAN NOTE
Sodium level today 130  Secondary to decreased free water excretion in the settings of ESRD  UF HD

## 2025-05-12 NOTE — PROGRESS NOTES
Progress Note - Nephrology   Name: Naresh Carpio 64 y.o. male I MRN: [id number]  Unit/Bed#: 4 [place] 414-01 I Date of Admission: 5/8/2025   Date of Service: 5/12/2025 I Hospital Day: 3     Assessment & Plan  ESRD (end stage renal disease) (HCC)  #ESRD on HD TTS:  Dialysis unit/days: [facility]  Access: PermCath  Continue HD as per schedule, next dialysis tomorrow  Renal Diet  Fluid restriction 1-1.5L/d  Adjust medications to GFR<10  Avoid opioids   Chronic anemia  Current hemoglobin:8.6mg/dL  Treatment:  EPO on HD  Transfuse for hemoglobin less than 7.0 per primary service    Essential hypertension  Volume: Euvolemic  Blood pressure: Hypertensive, /78  Low-sodium diet  UF on HD  Continue nifedipine 30 mg 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  Nephrology is consulted for management of ESRD    Patient appears very weak, no shortness of breath.  Had dialysis Saturday with no complications    Objective :  Temp:  [98.4 °F (36.9 °C)-98.6 °F (37 °C)] 98.4 °F (36.9 °C)  HR:  [63-79] 63  BP: (148-179)/(62-78) 179/78  Resp:  [16-18] 16  SpO2:  [96 %-99 %] 98 %  O2 Device: None (Room air)    Current Weight: Weight - Scale: 91.3 kg (201 lb 3.2 oz)  First Weight: Weight - Scale: 93.2 kg (205 lb 8 oz)  I/O         05/10 0701  05/11 0700 05/11 0701  05/12 0700    P.O. 480 360    I.V. (mL/kg) 500 (5.5)     Total Intake(mL/kg) 980 (10.7) 360 (3.9)    Urine (mL/kg/hr) 60 (0) 350 (0.2)    Other 1400     Total Output 1460 350    Net -480 +10                Physical Exam  General:  no acute distress at this time  Skin:  No acute rash  Eyes:  No scleral  icterus and noninjected  ENT:  mucous membranes moist  Neck:  no carotid bruits  Chest:  Clear to auscultation percussion, good respiratory effort, no use of accessory respiratory muscles  CVS:  Regular rate and rhythm without rub   Abdomen:  soft and nontender   Extremities: foot amputation   Neuro:  No gross focality  Psych:  Alert , cooperative   Dialysis access PermCath:    Medications:    Current Facility-Administered Medications:     acetaminophen (TYLENOL) tablet 650 mg, 650 mg, Oral, Q6H PRN, Devante Shah MD    acetaminophen (TYLENOL) tablet 975 mg, 975 mg, Oral, Q8H KEMAL, Devante Shah MD, 975 mg at 05/12/25 0537    allopurinol (ZYLOPRIM) tablet 100 mg, 100 mg, Oral, Daily, Devante Shah MD, 100 mg at 05/11/25 0941    atorvastatin (LIPITOR) tablet 80 mg, 80 mg, Oral, Daily With Dinner, Devante Shah MD, 80 mg at 05/11/25 1705    docusate sodium (COLACE) capsule 100 mg, 100 mg, Oral, BID, Devante Shah MD, 100 mg at 05/11/25 1705    epoetin jessica (EPOGEN,PROCRIT) injection 3,000 Units, 3,000 Units, Subcutaneous, Once per day on Tuesday Thursday Saturday, 3,000 Units at 05/10/25 1523 **AND** epoetin jessica (EPOGEN,PROCRIT) injection 2,000 Units, 2,000 Units, Subcutaneous, Once per day on Tuesday Thursday Saturday, German Linton MD, 2,000 Units at 05/10/25 1524    gabapentin (NEURONTIN) capsule 100 mg, 100 mg, Oral, Once per day on Monday Wednesday Friday, Devante Shah MD, 100 mg at 05/09/25 0848    HYDROmorphone HCl (DILAUDID) injection 0.2 mg, 0.2 mg, Intravenous, Q3H PRN, GRANT Rich, 0.2 mg at 05/12/25 0336    lidocaine (LIDODERM) 5 % patch 2 patch, 2 patch, Topical, Daily, Devante Shah MD, 2 patch at 05/11/25 0942    melatonin tablet 6 mg, 6 mg, Oral, HS, Devante Shah MD, 6 mg at 05/11/25 2252    methocarbamol (ROBAXIN) tablet 500 mg, 500 mg, Oral, TID PRN, Devante Shah MD, 500 mg at 05/11/25 2253    NIFEdipine (PROCARDIA XL) 24 hr tablet 30 mg, 30 mg, Oral, After Dinner, Devante Shah MD, 30  "mg at 05/11/25 1705    ondansetron (ZOFRAN-ODT) dispersible tablet 4 mg, 4 mg, Oral, Q6H PRN, Devante Shah MD    oxyCODONE (ROXICODONE) IR tablet 5 mg, 5 mg, Oral, Q8H PRN, Devante Shah MD, 5 mg at 05/12/25 0536    polyethylene glycol (MIRALAX) packet 17 g, 17 g, Oral, Daily PRN, Devante Shah MD, 17 g at 05/09/25 0848    sevelamer (RENAGEL) tablet 1,600 mg, 1,600 mg, Oral, TID With Meals, Devante Shah MD, 1,600 mg at 05/11/25 1705    white petrolatum-mineral oil (EUCERIN,HYDROCERIN) cream, , Topical, TID PRN, Devante Shah MD      Lab Results: I have reviewed the following results:  Results from last 7 days   Lab Units 05/12/25  0532 05/11/25  0504 05/10/25  0433 05/09/25  0414 05/08/25  0948 05/08/25  0749   WBC Thousand/uL  --   --  5.37 5.42  --  8.16   HEMOGLOBIN g/dL 8.6* 8.5* 8.5* 8.6*  --  9.4*   HEMATOCRIT % 28.2* 27.5* 27.2* 27.3*  --  30.2*   PLATELETS Thousands/uL  --   --  210 202  --  231   POTASSIUM mmol/L 4.1 4.1 4.1 3.7 3.7 5.2   CHLORIDE mmol/L 95* 94* 97 96 98 96   CO2 mmol/L 23 23 21 22 23 20*   BUN mg/dL 33* 21 31* 19 25 29*   CREATININE mg/dL 5.15* 3.90* 5.63* 4.34* 4.90* 5.83*   CALCIUM mg/dL 8.8 8.6 8.4 8.3* 8.0* 8.7   MAGNESIUM mg/dL 2.1  --   --   --   --  2.3   PHOSPHORUS mg/dL 3.4  --   --   --   --  3.6       Administrative Statements     Portions of the record may have been created with voice recognition software. Occasional wrong word or \"sound a like\" substitutions may have occurred due to the inherent limitations of voice recognition software. Read the chart carefully and recognize, using context, where substitutions have occurred.If you have any questions, please contact the dictating provider.  "

## 2025-05-13 ENCOUNTER — APPOINTMENT (INPATIENT)
Dept: DIALYSIS | Facility: HOSPITAL | Age: 65
DRG: 555 | End: 2025-05-13
Attending: STUDENT IN AN ORGANIZED HEALTH CARE EDUCATION/TRAINING PROGRAM
Payer: MEDICARE

## 2025-05-13 ENCOUNTER — APPOINTMENT (INPATIENT)
Dept: RADIOLOGY | Facility: HOSPITAL | Age: 65
DRG: 555 | End: 2025-05-13
Payer: MEDICARE

## 2025-05-13 VITALS
TEMPERATURE: 97.6 F | HEART RATE: 68 BPM | HEIGHT: 73 IN | DIASTOLIC BLOOD PRESSURE: 57 MMHG | RESPIRATION RATE: 18 BRPM | OXYGEN SATURATION: 95 % | WEIGHT: 204.2 LBS | BODY MASS INDEX: 27.06 KG/M2 | SYSTOLIC BLOOD PRESSURE: 148 MMHG

## 2025-05-13 LAB
ANION GAP SERPL CALCULATED.3IONS-SCNC: 11 MMOL/L (ref 4–13)
BUN SERPL-MCNC: 40 MG/DL (ref 5–25)
CALCIUM SERPL-MCNC: 8.5 MG/DL (ref 8.4–10.2)
CHLORIDE SERPL-SCNC: 94 MMOL/L (ref 96–108)
CO2 SERPL-SCNC: 24 MMOL/L (ref 21–32)
CREAT SERPL-MCNC: 5.49 MG/DL (ref 0.6–1.3)
GFR SERPL CREATININE-BSD FRML MDRD: 10 ML/MIN/1.73SQ M
GLUCOSE SERPL-MCNC: 129 MG/DL (ref 65–140)
HCT VFR BLD AUTO: 27 % (ref 36.5–49.3)
HGB BLD-MCNC: 8.4 G/DL (ref 12–17)
POTASSIUM SERPL-SCNC: 4.2 MMOL/L (ref 3.5–5.3)
SODIUM SERPL-SCNC: 129 MMOL/L (ref 135–147)

## 2025-05-13 PROCEDURE — 85018 HEMOGLOBIN: CPT | Performed by: INTERNAL MEDICINE

## 2025-05-13 PROCEDURE — 74018 RADEX ABDOMEN 1 VIEW: CPT

## 2025-05-13 PROCEDURE — 99232 SBSQ HOSP IP/OBS MODERATE 35: CPT | Performed by: STUDENT IN AN ORGANIZED HEALTH CARE EDUCATION/TRAINING PROGRAM

## 2025-05-13 PROCEDURE — 80048 BASIC METABOLIC PNL TOTAL CA: CPT | Performed by: INTERNAL MEDICINE

## 2025-05-13 PROCEDURE — 99239 HOSP IP/OBS DSCHRG MGMT >30: CPT

## 2025-05-13 PROCEDURE — 85014 HEMATOCRIT: CPT | Performed by: INTERNAL MEDICINE

## 2025-05-13 RX ORDER — SENNOSIDES 8.6 MG
650 CAPSULE ORAL EVERY 8 HOURS PRN
Start: 2025-05-13 | End: 2025-05-13

## 2025-05-13 RX ORDER — LIDOCAINE 50 MG/G
1 PATCH TOPICAL DAILY
Start: 2025-05-13

## 2025-05-13 RX ORDER — OXYCODONE HYDROCHLORIDE 5 MG/1
2.5 TABLET ORAL EVERY 8 HOURS PRN
Status: SHIPPED | OUTPATIENT
Start: 2025-05-13 | End: 2025-05-23

## 2025-05-13 RX ADMIN — OXYCODONE HYDROCHLORIDE 5 MG: 5 TABLET ORAL at 11:25

## 2025-05-13 RX ADMIN — ALLOPURINOL 100 MG: 100 TABLET ORAL at 11:02

## 2025-05-13 RX ADMIN — ACETAMINOPHEN 975 MG: 325 TABLET, FILM COATED ORAL at 06:38

## 2025-05-13 RX ADMIN — OXYCODONE HYDROCHLORIDE 5 MG: 5 TABLET ORAL at 00:19

## 2025-05-13 RX ADMIN — SEVELAMER HYDROCHLORIDE 1600 MG: 800 TABLET, FILM COATED ORAL at 11:25

## 2025-05-13 RX ADMIN — SEVELAMER HYDROCHLORIDE 1600 MG: 800 TABLET, FILM COATED ORAL at 07:42

## 2025-05-13 RX ADMIN — ACETAMINOPHEN 975 MG: 325 TABLET, FILM COATED ORAL at 14:33

## 2025-05-13 RX ADMIN — HYDROMORPHONE HYDROCHLORIDE 0.2 MG: 0.2 INJECTION, SOLUTION INTRAMUSCULAR; INTRAVENOUS; SUBCUTANEOUS at 04:37

## 2025-05-13 RX ADMIN — EPOETIN ALFA 2000 UNITS: 2000 SOLUTION INTRAVENOUS; SUBCUTANEOUS at 12:19

## 2025-05-13 RX ADMIN — EPOETIN ALFA 3000 UNITS: 3000 SOLUTION INTRAVENOUS; SUBCUTANEOUS at 12:15

## 2025-05-13 RX ADMIN — HYDROMORPHONE HYDROCHLORIDE 0.2 MG: 0.2 INJECTION, SOLUTION INTRAMUSCULAR; INTRAVENOUS; SUBCUTANEOUS at 07:42

## 2025-05-13 NOTE — ASSESSMENT & PLAN NOTE
Seen by ophthalmology last month due to floaters and blurry vision  Proliferative retinopathy with vitreous hemorrhage OU  Ophthalmology recommended OP follow-up immediately after discharge for ongoing management and referral to a retinal specialist  CT of head with increased density within the right globe is unchanged from the examination performed 5/3/2025 but new compared to a prior MRI dated 10/9/2024 - possibly related to retinal detachment  Previous provider discussed with ophthalmology, Dr. Chaudhry, who recommended ambulatory referral to the [facility] ([phone]) for retina specialist appointment  I discussed the case again with ophthalmologist on-call today Dr. Chaudhry who recommended no acute interventions while patient is hospitalized at this time.  Recommended outpatient follow-up.  Discussed the case with patient/family and case management.  Appointment scheduled on 5/21/2025 with ophthalmology

## 2025-05-13 NOTE — PLAN OF CARE
Post-Dialysis RN Treatment Note    Blood Pressure:  Pre 152/58 mm/Hg  Post 148/57 mmHg   EDW:  93 kg    Weight:  Pre 92.6 kg   Post 91.9 kg   Mode of weight measurement: Standing Scale   Volume Removed:  700 ml    Treatment duration: 210 minutes    NS given:  No    Treatment shortened No   Medications given during Rx: Epogen 5000 U   Estimated Kt/V:  NA   Access type: Permacath/TDC   Needle Gauge:  NA   Access Issues: No    Report called to primary nurse:   Yes  Esther Tian RN          Goal of treatment is the removal of 1.0 kg fluid in this treatment session.  Clearance of renal toxins and wastes.      Problem: METABOLIC, FLUID AND ELECTROLYTES - ADULT  Goal: Electrolytes maintained within normal limits  Description: INTERVENTIONS:- Monitor labs and assess patient for signs and symptoms of electrolyte imbalances- Administer electrolyte replacement as ordered- Monitor response to electrolyte replacements, including repeat lab results as appropriate- Instruct patient on fluid and nutrition as appropriate  Outcome: Progressing  Goal: Fluid balance maintained  Description: INTERVENTIONS:- Monitor labs - Monitor I/O and WT- Instruct patient on fluid and nutrition as appropriate- Assess for signs & symptoms of volume excess or deficit  Outcome: Progressing

## 2025-05-13 NOTE — ASSESSMENT & PLAN NOTE
Current hemoglobin:8.6mg/dL  Treatment:  Epogen on HD  Transfuse for hemoglobin less than 7.0 per primary service

## 2025-05-13 NOTE — ASSESSMENT & PLAN NOTE
Sodium level today 130mmol/L  Secondary to decreased free water excretion in the settings of ESRD  Ultrafiltration with dialysis as tolerated

## 2025-05-13 NOTE — [place] UNIVERSAL TRANSFER FORM
"[place] UNIVERSAL TRANSFER FORM  (ALL ITEMS MUST BE COMPLETED)    1. TRANSFER FROM: [facility]      TRANSFER TO: [facility]    2. DATE OF TRANSFER: 5/13/2025                        TIME OF TRANSFER: 1530    3. PATIENT NAME: Naresh Carpio D      YOB: 1960                             GENDER: male    4. LANGUAGE:   English    5. PHYSICIAN NAME:  Nirav Bruce MD                   PHONE: [phone]    6. CODE STATUS: Level 1 - Full Code        Out of Hospital DNR Attached: No    7. :                                      :  Extended Emergency Contact Information  Primary Emergency Contact: Alicja Carpio  Mobile Phone: [phone]  Relation: Sister  Secondary Emergency Contact: Mercedes Resendez  Mobile Phone: [phone]  Relation: Friend           Health Care Representative/Proxy:  Yes           Legal Guardian:  No             NAME OF:           HEALTH CARE REPRESENTATIVE/PROXY:                                         OR           LEGAL GUARDIAN, IF NOT :  Alicja Carpio                                             PHONE:  (Day)  [phone]         (Night)                        (Cell)    8. REASON FOR TRANSFER: Ambulatory dysfunction and falls            V/S: /57 (BP Location: Left arm)   Pulse 68   Temp 97.6 °F (36.4 °C)   Resp 18   Ht 6' 1\" (1.854 m)   Wt 92.6 kg (204 lb 3.2 oz)   SpO2 95%   BMI 26.94 kg/m²           PAIN: Yes, Rating 6, Site BLE, and Treatment Oxycodone    9. PRIMARY DIAGNOSIS: Ambulatory dysfunction      Secondary Diagnosis:         Pacemaker: No      Internal Defib: No          Mental Health Diagnosis (if Applicable):    10. RESTRAINTS: No     11. RESPIRATORY NEEDS: None    12. ISOLATION/PRECAUTION: MRSA    13. ALLERGY: Patient has no known allergies.    14. SENSORY:       Vision Blind    15. SKIN CONDITION: Yes:  Diabetic BLE  Ulcers    16. DIET: Special " (describe)Renal diet 1800 fluid restriction    17. IV ACCESS: None    18. PERSONAL ITEMS SENT WITH PATIENT: Glasses and Walker    19. ATTACHED DOCUMENTS: MUST ATTACH CURRENT MEDICATION INFORMATION Face Sheet, MAR, Medication Reconciliation, Labs, and Discharge Summary    20. AT RISK ALERTS:Falls        HARM TO: N/A    21. WEIGHT BEARING STATUS:         Left Leg: Limited        Right Leg: Limited    22. MENTAL STATUS:Alert and Oriented    23. FUNCTION:        Walk: With Help        Transfer: With Help        Toilet: With Help        Feed: Self    24. IMMUNIZATIONS/SCREENING:     Immunization History   Administered Date(s) Administered    COVID-19 J&J ([place]) vaccine 0.5 mL 05/07/2021    COVID-19 Pfizer Vac BIVALENT Boaz-sucrose 5 yr-11 yr IM 10/20/2022    Pneumococcal Polysaccharide PPV23 07/14/2022    Tdap 10/08/2024       25. BOWEL: Continent    26. BLADDER: Continent    27. SENDING FACILITY CONTACT: [facility]                  Title: RN        Unit: 4N        Phone: [phone]          REC'G FACILITY CONTACT (if known):        Title:        Unit:         Phone:         FORM PREFILLED BY (if applicable)       Title:       Unit:        Phone:         FORM COMPLETED BY Esther Tian RN      Title: [facility]      Phone: [phone]

## 2025-05-13 NOTE — ASSESSMENT & PLAN NOTE
#ESRD on HD TTS:  Dialysis unit/days: [facility]  Access: PermCat  Plan for dialysis today as per schedule   Fluid restriction 1.8 L   Adjust medications to GFR<10  Avoid opioids

## 2025-05-13 NOTE — PROGRESS NOTES
Progress Note - Nephrology   Name: Naresh Carpio 64 y.o. male I MRN: [id number]  Unit/Bed#: 4 [place] 414-01 I Date of Admission: 5/8/2025   Date of Service: 5/13/2025 I Hospital Day: 4     Assessment & Plan  ESRD (end stage renal disease) (HCC)  #ESRD on HD TTS:  Dialysis unit/days: [facility]  Access: PermCat  Plan for dialysis today as per schedule   Fluid restriction 1.8 L   Adjust medications to GFR<10  Avoid opioids   Chronic anemia  Current hemoglobin:8.6mg/dL  Treatment:  Epogen on HD  Transfuse for hemoglobin less than 7.0 per primary service    Essential hypertension  Volume: Hypervolemic  Blood pressure: Hypertensive, /67mmhg   Low-sodium diet  Ultrafiltration on dialysis  Continue nifedipine 30 mg 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, on chronic dialysis ([facility])  Sevelamer with meals 1600 mg 3 times daily  Hyponatremia  Sodium level today 130mmol/L  Secondary to decreased free water excretion in the settings of ESRD  Ultrafiltration with dialysis as tolerated      I have reviewed the nephrology recommendations including hemodialysis tomorrow, with primary team, and we are in agreement with renal plan including the information outlined above. I have discussed the above management plan in detail with the primary service.     Subjective   Brief History of Admission - 65 yo male with PMH of ESRD on dialysis, TTS, multiple admissions, p/after a fall.  Nephrology is consulted for management of ESRD     Patient complains of blurry vision on right eye that is not new but has been getting worse.  No shortness of breath.  Had dialysis yesterday, well-tolerated    Objective :  Temp:  [97.2 °F (36.2 °C)-98.8 °F (37.1 °C)] 98.8 °F (37.1 °C)  HR:  [61-79] 61  BP: (148-209)/(55-87) 170/67  Resp:  [18-21] 21  SpO2:  [95 %-99 %] 97 %  O2 Device: None (Room air)    Current Weight: Weight - Scale: 92.6 kg (204 lb 3.2 oz)  First Weight: Weight -  Scale: 93.2 kg (205 lb 8 oz)  I/O         05/11 0701  05/12 0700 05/12 0701 05/13 0700    P.O. 360 480    Total Intake(mL/kg) 360 (3.9) 480 (5.2)    Urine (mL/kg/hr) 350 (0.2) 500 (0.2)    Stool  0    Total Output 350 500    Net +10 -20          Unmeasured Urine Occurrence  1 x          Physical Exam  General:  no acute distress at this time  Skin:  No acute rash  Eyes:  No scleral icterus and noninjected  ENT:  mucous membranes moist  Neck:  no carotid bruits  Chest:  Clear to auscultation percussion, good respiratory effort, no use of accessory respiratory muscles  CVS:  Regular rate and rhythm without rub   Abdomen:  soft and nontender   Extremities: lower extremity edema  Neuro:  No gross focality  Psych:  Alert , cooperative   Dialysis access: PermCath    Medications:    Current Facility-Administered Medications:     acetaminophen (TYLENOL) tablet 650 mg, 650 mg, Oral, Q6H PRN, Devante Shah MD    acetaminophen (TYLENOL) tablet 975 mg, 975 mg, Oral, Q8H KEMAL, Devante Shah MD, 975 mg at 05/12/25 2235    allopurinol (ZYLOPRIM) tablet 100 mg, 100 mg, Oral, Daily, Devante Shah MD, 100 mg at 05/12/25 0801    atorvastatin (LIPITOR) tablet 80 mg, 80 mg, Oral, Daily With Dinner, Devante Shah MD, 80 mg at 05/12/25 1550    docusate sodium (COLACE) capsule 100 mg, 100 mg, Oral, BID, Devante Shah MD, 100 mg at 05/12/25 1717    epoetin jessica (EPOGEN,PROCRIT) injection 3,000 Units, 3,000 Units, Subcutaneous, Once per day on Tuesday Thursday Saturday, 3,000 Units at 05/10/25 1523 **AND** epoetin jessica (EPOGEN,PROCRIT) injection 2,000 Units, 2,000 Units, Subcutaneous, Once per day on Tuesday Thursday Saturday, German Linton MD, 2,000 Units at 05/10/25 1524    gabapentin (NEURONTIN) capsule 100 mg, 100 mg, Oral, Once per day on Monday Wednesday Friday, Devante Shah MD, 100 mg at 05/12/25 0801    HYDROmorphone HCl (DILAUDID) injection 0.2 mg, 0.2 mg, Intravenous, Q3H PRN, GRANT Rich, 0.2 mg at 05/13/25 0437     "lidocaine (LIDODERM) 5 % patch 2 patch, 2 patch, Topical, Daily, Devante Shah MD, 2 patch at 05/11/25 0942    melatonin tablet 6 mg, 6 mg, Oral, HS, Devante Shah MD, 6 mg at 05/12/25 2235    methocarbamol (ROBAXIN) tablet 500 mg, 500 mg, Oral, TID PRN, Devante Shah MD, 500 mg at 05/11/25 2253    NIFEdipine (PROCARDIA XL) 24 hr tablet 30 mg, 30 mg, Oral, After Dinner, Devante Shah MD, 30 mg at 05/12/25 1716    ondansetron (ZOFRAN-ODT) dispersible tablet 4 mg, 4 mg, Oral, Q6H PRN, Devante Shah MD    oxyCODONE (ROXICODONE) IR tablet 5 mg, 5 mg, Oral, Q8H PRN, Devante Shah MD, 5 mg at 05/13/25 0019    polyethylene glycol (MIRALAX) packet 17 g, 17 g, Oral, Daily PRN, Devante Shah MD, 17 g at 05/09/25 0848    sevelamer (RENAGEL) tablet 1,600 mg, 1,600 mg, Oral, TID With Meals, Devante Shah MD, 1,600 mg at 05/12/25 1550    white petrolatum-mineral oil (EUCERIN,HYDROCERIN) cream, , Topical, TID PRN, Devante Shah MD      Lab Results: I have reviewed the following results:  Results from last 7 days   Lab Units 05/12/25  0532 05/11/25  0504 05/10/25  0433 05/09/25  0414 05/08/25  0948 05/08/25  0749   WBC Thousand/uL  --   --  5.37 5.42  --  8.16   HEMOGLOBIN g/dL 8.6* 8.5* 8.5* 8.6*  --  9.4*   HEMATOCRIT % 28.2* 27.5* 27.2* 27.3*  --  30.2*   PLATELETS Thousands/uL  --   --  210 202  --  231   POTASSIUM mmol/L 4.1 4.1 4.1 3.7 3.7 5.2   CHLORIDE mmol/L 95* 94* 97 96 98 96   CO2 mmol/L 23 23 21 22 23 20*   BUN mg/dL 33* 21 31* 19 25 29*   CREATININE mg/dL 5.15* 3.90* 5.63* 4.34* 4.90* 5.83*   CALCIUM mg/dL 8.8 8.6 8.4 8.3* 8.0* 8.7   MAGNESIUM mg/dL 2.1  --   --   --   --  2.3   PHOSPHORUS mg/dL 3.4  --   --   --   --  3.6       Administrative Statements     Portions of the record may have been created with voice recognition software. Occasional wrong word or \"sound a like\" substitutions may have occurred due to the inherent limitations of voice recognition software. Read the chart carefully and recognize, using " context, where substitutions have occurred.If you have any questions, please contact the dictating provider.

## 2025-05-13 NOTE — CASE MANAGEMENT
Case Management Discharge Planning Note    Patient name Naresh Carpio  Location 4 [place] 414/4 [place] 414-* MRN [id number]  : 1960 Date 2025       Current Admission Date: 2025  Current Admission Diagnosis:Ambulatory dysfunction   Patient Active Problem List    Diagnosis Date Noted Date Diagnosed    Ambulatory dysfunction 2025     Retinopathy 2025     At risk for acid-base imbalance 2025     Electrolyte imbalance risk 2025     Hallucinations 2025     Type 2 diabetes mellitus with foot ulcer, without long-term current use of insulin ([facility]) 2025     Insomnia 2025     Hyperphosphatemia 2025     Wound of skin 2025     At high risk for skin breakdown 2025     At risk for venous thromboembolism (VTE) 2025     Impaired mobility and activities of daily living 2025     Visual disturbance 2025     Pseudoaneurysm ([facility]) 2025     Acute pain due to trauma 2025     ESRD on dialysis ([facility]) 2025     Wounds, multiple 2025     Traumatic retroperitoneal hemorrhage 2025     Secondary hyperparathyroidism ([facility]) 2025     At risk for electrolyte imbalance 2025     Multiple open wounds of foot 2025     Disorder of acid-base balance 2025     L2 vertebral fracture ([facility]) 2025     Non-compliance with treatment 2025     Generalized weakness 2025     Gout 2025     Closed fracture of proximal end of left humerus with routine healing 02/10/2025     Left shoulder pain 2025     ESRD (end stage renal disease) on dialysis ([facility]) 2025     Primary hypertension 2025     Anemia in ESRD (end-stage renal disease)  ([facility]) 2025     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, on chronic dialysis ([facility]) 2025     Renal lesion 2024     Chronic wound 2024     Chronic diastolic (congestive) heart failure ([facility])  11/19/2024     Pleural effusion 11/19/2024     Paroxysmal atrial fibrillation (HCC)      ESRD (end stage renal disease) ([facility]) 10/25/2024     Hyponatremia 10/19/2024     Acute on chronic anemia 10/14/2024     PAD (peripheral artery disease) ([facility]) 10/08/2024     Hyperkalemia 04/06/2024     Difficulty with speech 03/19/2024     History of amputation of hallux ([facility]) 03/18/2024     At risk for constipation 09/06/2023     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 mellitus, with necrosis of bone ([facility])      Acquired deformity of foot, right      Charcot's joint      Open wound of right foot 08/21/2023     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 mellitus, with bone involvement without evidence of necrosis ([facility])      Diabetic ulcer of left midfoot associated with type 2 diabetes mellitus, limited to breakdown of skin ([facility])      Diabetic polyneuropathy associated with type 2 diabetes mellitus ([facility])      Diabetes mellitus type 2 with peripheral artery disease ([facility])      History of amputation of lesser toe of left foot ([facility])      Onychomycosis      Traumatic retroperitoneal hematoma 08/19/2023     Osteoarthritis of left hip 07/27/2022     Severe Left Orchalgia 07/26/2022     Right shoulder pain 07/26/2022     Left hip pain 07/06/2022     Hemodialysis status ([facility])      Hypervolemia      Chronic anemia 01/21/2022     History of prediabetes      Essential hypertension      History of TIA (transient ischemic attack)      T10 vertebral fracture ([facility])        LOS (days): 4  Geometric Mean LOS (GMLOS) (days): 3.8  Days to GMLOS:0     OBJECTIVE:  Risk of Unplanned Readmission Score: 66.2      Current admission status: Inpatient   Preferred Pharmacy:   [id number] - [place], [place] - [address]  [address]  [facility] [address]  Phone: [phone] Fax: [phone]    [id number] - [place], [place] - [address]  [address]  [facility] [address]  Phone: [phone] Fax: [phone]    Primary Care Provider: Jeffrey Jackson    Primary Insurance: MEDICARE  Secondary Insurance:     DISCHARGE DETAILS:    Discharge planning discussed with:: Patient    Other Referral/Resources/Interventions Provided:  Interventions: Short Term Rehab, Transportation, Other (Specify)  Referral Comments: Call made to [facility]. [facility] spoke with rep Hughesne to advise patient to admit to [facility] today for STR and will resume in-clinic HD on Thursday. Patient requested [facility] assistance in scheduling OP f/u appt with opthamologist Dr. Chaudhry. Appt scheduled for 5/21 at 1400. Information on AVS. [facility] admissions confirmed they can accept patient today and have been notified of scheduled pickup time.     Treatment Team Recommendation: Short Term Rehab  Discharge Destination Plan:: Short Term Rehab  Transport at Discharge : Wheelchair van     Number/Name of Dispatcher: [facility]  Transported by (Company and Unit #): [employer]  ETA of Transport (Date): 05/13/25  ETA of Transport (Time): 1530    Accepting Facility Name, City & State : [facility]  Receiving Facility/Agency Phone Number: [phone]

## 2025-05-13 NOTE — DISCHARGE INSTR - AVS FIRST PAGE
Please make sure to call retina care office with any questions should you experience any sudden change in vision ([phone])       dr. edmond . 5/21 at 2:00PM

## 2025-05-13 NOTE — ASSESSMENT & PLAN NOTE
Presented status post mechanical fall with complaints of lower back pain and finding of a L2 compression fracture   CT spine lumbar showed stable obliquely oriented fracture extending through the L2 vertebral body without retropulsion or extension of the posterior elements  Encourage compliance to LSO brace when not sleeping or showering  Ongoing PT/OT -> patient now agreeable to another course of skilled rehab   PRN pain control  Due to recent retroperitoneal hemorrhage on fall last month and waxing waning abdominal discomfort, completed repeat CT of abdomen/pelvis to ensure continued stabilization - Decreasing right retroperitoneal and left gluteal hematomas.   Plan for discharge to acute rehab.

## 2025-05-13 NOTE — DISCHARGE SUMMARY
Discharge Summary - Hospitalist   Name: Naresh Carpio 64 y.o. male I MRN: [id number]  Unit/Bed#: 4 [address] I Date of Admission: 5/8/2025   Date of Service: 5/13/2025 I Hospital Day: 4     Assessment & Plan  Ambulatory dysfunction  Presented status post mechanical fall with complaints of lower back pain and finding of a L2 compression fracture   CT spine lumbar showed stable obliquely oriented fracture extending through the L2 vertebral body without retropulsion or extension of the posterior elements  Encourage compliance to LSO brace when not sleeping or showering  Ongoing PT/OT -> patient now agreeable to another course of skilled rehab   PRN pain control  Due to recent retroperitoneal hemorrhage on fall last month and waxing waning abdominal discomfort, completed repeat CT of abdomen/pelvis to ensure continued stabilization - Decreasing right retroperitoneal and left gluteal hematomas.   Plan for discharge to acute rehab.  ESRD (end stage renal disease) (HCC)  Routine hemodialysis per nephrology  On Renagel supplementation  Chronic diastolic (congestive) heart failure (HCC)  Fluid management via dialysis  Fluid restriction enforced  Monitor/replete electrolyte deficiencies, if present  Essential hypertension  Continue Procardia  Chronic anemia  In the setting of ESRD - continue Epogen w/ dialysis sessions  H/H stable  Hyponatremia  Managed via dialysis  Retinopathy  Seen by ophthalmology last month due to floaters and blurry vision  Proliferative retinopathy with vitreous hemorrhage OU  Ophthalmology recommended OP follow-up immediately after discharge for ongoing management and referral to a retinal specialist  CT of head with increased density within the right globe is unchanged from the examination performed 5/3/2025 but new compared to a prior MRI dated 10/9/2024 - possibly related to retinal detachment  Previous provider discussed with ophthalmology, Dr. Chaudhry, who recommended ambulatory referral to  "the [facility] ([phone]) for retina specialist appointment  I discussed the case again with ophthalmologist on-call today Dr. Chaudhry who recommended no acute interventions while patient is hospitalized at this time.  Recommended outpatient follow-up.  Discussed the case with patient/family and case management.  Appointment scheduled on 5/21/2025 with ophthalmology   Paroxysmal atrial fibrillation (HCC)  Has previously declined anticoagulation due to recent hospitalization for traumatic retroperitoneal hematoma  Benefits and risk discussed with the patient, by previous provider  Rate controlled off agents     Medical Problems       Resolved Problems  Date Reviewed: 5/13/2025   None       Discharging Physician / Practitioner: Nirav Bruce MD  PCP: Jeffrey Jackson  Admission Date:   Admission Orders (From admission, onward)       Ordered        05/09/25 1537  INPATIENT ADMISSION  Once            05/08/25 0750  Place in Observation  Once                          Discharge Date: 05/13/25    Consultations During Hospital Stay:  Nephrology     Procedures Performed:   CT abdomen pelvis wo contrast   Final Result      Decreasing right retroperitoneal and left gluteal hematomas.      Workstation performed: [id number]         CT head without contrast   Final Result      No acute intracranial abnormality.      Increased density within the right globe is unchanged from the examination performed 5/3/2025 but new compared to a prior MRI dated 10/9/2024. Findings may be related to retinal detachment. Recommend ophthalmology evaluation and consider dedicated MRI    imaging of the orbits.      This examination was marked \"immediate notification\" in Epic in order to begin the standard process by which the radiology reading room liaison alerts the referring practitioner.                  Workstation performed: [id number]         CT spine cervical without contrast   Final Result      No cervical spine fracture or traumatic " malalignment.                  Workstation performed: [id number]         CT spine lumbar without contrast   Final Result      Stable obliquely oriented fracture extending through the L2 vertebral body without bony retropulsion or extension into the posterior elements. Stable alignment. No new fracture.      Stable mild lumbar degenerative change.      Workstation performed: [id number]               Significant Findings / Test Results:   Results for orders placed or performed during the hospital encounter of 05/08/25   MRSA culture    Specimen: Nose; Nares   Result Value Ref Range    MRSA Culture Only (A)      Methicillin Resistant Staphylococcus aureus isolated    MRSA Culture Only       This patient requires contact isolation precautions per [place] law. Contact precautions are not required in [place] for nasal surveillance cultures.   CBC and differential   Result Value Ref Range    WBC 8.16 4.31 - 10.16 Thousand/uL    RBC 3.11 (L) 3.88 - 5.62 Million/uL    Hemoglobin 9.4 (L) 12.0 - 17.0 g/dL    Hematocrit 30.2 (L) 36.5 - 49.3 %    MCV 97 82 - 98 fL    MCH 30.2 26.8 - 34.3 pg    MCHC 31.1 (L) 31.4 - 37.4 g/dL    RDW 18.5 (H) 11.6 - 15.1 %    MPV 8.7 (L) 8.9 - 12.7 fL    Platelets 231 149 - 390 Thousands/uL    nRBC 0 /100 WBCs    Segmented % 74 43 - 75 %    Immature Grans % 0 0 - 2 %    Lymphocytes % 13 (L) 14 - 44 %    Monocytes % 9 4 - 12 %    Eosinophils Relative 3 0 - 6 %    Basophils Relative 1 0 - 1 %    Absolute Neutrophils 6.05 1.85 - 7.62 Thousands/µL    Absolute Immature Grans 0.03 0.00 - 0.20 Thousand/uL    Absolute Lymphocytes 1.02 0.60 - 4.47 Thousands/µL    Absolute Monocytes 0.76 0.17 - 1.22 Thousand/µL    Eosinophils Absolute 0.26 0.00 - 0.61 Thousand/µL    Basophils Absolute 0.04 0.00 - 0.10 Thousands/µL   Basic metabolic panel   Result Value Ref Range    Sodium 128 (L) 135 - 147 mmol/L    Potassium 5.2 3.5 - 5.3 mmol/L    Chloride 96 96 - 108 mmol/L    CO2 20 (L) 21 - 32 mmol/L    ANION GAP  12 4 - 13 mmol/L    BUN 29 (H) 5 - 25 mg/dL    Creatinine 5.83 (H) 0.60 - 1.30 mg/dL    Glucose 90 65 - 140 mg/dL    Calcium 8.7 8.4 - 10.2 mg/dL    eGFR 9 ml/min/1.73sq m   Magnesium   Result Value Ref Range    Magnesium 2.3 1.9 - 2.7 mg/dL   Phosphorus   Result Value Ref Range    Phosphorus 3.6 2.3 - 4.1 mg/dL   Basic metabolic panel   Result Value Ref Range    Sodium 131 (L) 135 - 147 mmol/L    Potassium 3.7 3.5 - 5.3 mmol/L    Chloride 98 96 - 108 mmol/L    CO2 23 21 - 32 mmol/L    ANION GAP 10 4 - 13 mmol/L    BUN 25 5 - 25 mg/dL    Creatinine 4.90 (H) 0.60 - 1.30 mg/dL    Glucose 92 65 - 140 mg/dL    Glucose, Fasting 92 65 - 99 mg/dL    Calcium 8.0 (L) 8.4 - 10.2 mg/dL    eGFR 11 ml/min/1.73sq m   CBC   Result Value Ref Range    WBC 5.42 4.31 - 10.16 Thousand/uL    RBC 2.85 (L) 3.88 - 5.62 Million/uL    Hemoglobin 8.6 (L) 12.0 - 17.0 g/dL    Hematocrit 27.3 (L) 36.5 - 49.3 %    MCV 96 82 - 98 fL    MCH 30.2 26.8 - 34.3 pg    MCHC 31.5 31.4 - 37.4 g/dL    RDW 18.6 (H) 11.6 - 15.1 %    Platelets 202 149 - 390 Thousands/uL    MPV 8.8 (L) 8.9 - 12.7 fL   Basic metabolic panel   Result Value Ref Range    Sodium 130 (L) 135 - 147 mmol/L    Potassium 3.7 3.5 - 5.3 mmol/L    Chloride 96 96 - 108 mmol/L    CO2 22 21 - 32 mmol/L    ANION GAP 12 4 - 13 mmol/L    BUN 19 5 - 25 mg/dL    Creatinine 4.34 (H) 0.60 - 1.30 mg/dL    Glucose 89 65 - 140 mg/dL    Glucose, Fasting 89 65 - 99 mg/dL    Calcium 8.3 (L) 8.4 - 10.2 mg/dL    eGFR 13 ml/min/1.73sq m   CBC   Result Value Ref Range    WBC 5.37 4.31 - 10.16 Thousand/uL    RBC 2.75 (L) 3.88 - 5.62 Million/uL    Hemoglobin 8.5 (L) 12.0 - 17.0 g/dL    Hematocrit 27.2 (L) 36.5 - 49.3 %    MCV 99 (H) 82 - 98 fL    MCH 30.9 26.8 - 34.3 pg    MCHC 31.3 (L) 31.4 - 37.4 g/dL    RDW 18.6 (H) 11.6 - 15.1 %    Platelets 210 149 - 390 Thousands/uL    MPV 8.9 8.9 - 12.7 fL   Basic metabolic panel   Result Value Ref Range    Sodium 131 (L) 135 - 147 mmol/L    Potassium 4.1 3.5 - 5.3 mmol/L     Chloride 97 96 - 108 mmol/L    CO2 21 21 - 32 mmol/L    ANION GAP 13 4 - 13 mmol/L    BUN 31 (H) 5 - 25 mg/dL    Creatinine 5.63 (H) 0.60 - 1.30 mg/dL    Glucose 99 65 - 140 mg/dL    Calcium 8.4 8.4 - 10.2 mg/dL    eGFR 9 ml/min/1.73sq m   Basic metabolic panel   Result Value Ref Range    Sodium 129 (L) 135 - 147 mmol/L    Potassium 4.1 3.5 - 5.3 mmol/L    Chloride 94 (L) 96 - 108 mmol/L    CO2 23 21 - 32 mmol/L    ANION GAP 12 4 - 13 mmol/L    BUN 21 5 - 25 mg/dL    Creatinine 3.90 (H) 0.60 - 1.30 mg/dL    Glucose 87 65 - 140 mg/dL    Calcium 8.6 8.4 - 10.2 mg/dL    eGFR 15 ml/min/1.73sq m   Hemoglobin and hematocrit, blood   Result Value Ref Range    Hemoglobin 8.5 (L) 12.0 - 17.0 g/dL    Hematocrit 27.5 (L) 36.5 - 49.3 %   Basic metabolic panel   Result Value Ref Range    Sodium 130 (L) 135 - 147 mmol/L    Potassium 4.1 3.5 - 5.3 mmol/L    Chloride 95 (L) 96 - 108 mmol/L    CO2 23 21 - 32 mmol/L    ANION GAP 12 4 - 13 mmol/L    BUN 33 (H) 5 - 25 mg/dL    Creatinine 5.15 (H) 0.60 - 1.30 mg/dL    Glucose 104 65 - 140 mg/dL    Calcium 8.8 8.4 - 10.2 mg/dL    eGFR 10 ml/min/1.73sq m   Hemoglobin and hematocrit, blood   Result Value Ref Range    Hemoglobin 8.6 (L) 12.0 - 17.0 g/dL    Hematocrit 28.2 (L) 36.5 - 49.3 %   Magnesium   Result Value Ref Range    Magnesium 2.1 1.9 - 2.7 mg/dL   Phosphorus   Result Value Ref Range    Phosphorus 3.4 2.3 - 4.1 mg/dL   Basic metabolic panel   Result Value Ref Range    Sodium 129 (L) 135 - 147 mmol/L    Potassium 4.2 3.5 - 5.3 mmol/L    Chloride 94 (L) 96 - 108 mmol/L    CO2 24 21 - 32 mmol/L    ANION GAP 11 4 - 13 mmol/L    BUN 40 (H) 5 - 25 mg/dL    Creatinine 5.49 (H) 0.60 - 1.30 mg/dL    Glucose 129 65 - 140 mg/dL    Calcium 8.5 8.4 - 10.2 mg/dL    eGFR 10 ml/min/1.73sq m   Hemoglobin and hematocrit, blood   Result Value Ref Range    Hemoglobin 8.4 (L) 12.0 - 17.0 g/dL    Hematocrit 27.0 (L) 36.5 - 49.3 %   Fingerstick Glucose (POCT)   Result Value Ref Range    POC Glucose  148 (H) 65 - 140 mg/dl         Incidental Findings:   None    I reviewed the above mentioned incidental findings with the patient and/or family and they expressed understanding.    Test Results Pending at Discharge (will require follow up):   None      Outpatient Tests Requested:  None     Complications:  none     Reason for Admission: Ambulatory dysfunction secondary to back pain    Hospital Course:   Naresh Carpio is a 64 y.o. male patient who originally presented to the hospital on 5/8/2025 due to ambulatory dysfunction secondary to back pain.  During the hospitalization nephrology team are consulted given the patient is on dialysis.  Completed dialysis sessions.  Pain regimen ordered.  Bowel regimen ordered.  PTOT evaluated the patient.  CT abdomen pelvis was repeated for follow-up regarding retroperitoneal hematoma which showed remarkable improvement in hematoma.  During the hospitalization patient reported that he is having right-sided blurring of vision and floaters for over 1 week now.  Discussed with on-call ophthalmologist multiple times during hospitalization.  No acute interventions recommended by specialist.  Ophthalmologist recommended outpatient follow-up.  Discussed with case management.  Appointment scheduled.  Patient is agreeable for outpatient follow-up.  He will be discharged to acute rehab at this time.      Of note; while hospitalized it was notable that he was refusing Eliquis.  Health risks were explained to him by the hospitalist team.             Please see above list of diagnoses and related plan for additional information.     Condition at Discharge: good    Discharge Day Visit / Exam:   Subjective: Patient seen today at bedside.  He continues to report his concerns regarding right-sided blurred vision with floaters.  Reports blindness in his left eye secondary to diabetes and a while ago.  I discussed the case with on-call ophthalmologist who recommended no acute interventions while  "hospitalized.  Recommended outpatient follow-up.  I discussed with the patient is agreeable.  Discussed with the patient the importance of reporting to ED or calling ophthalmology phone number that is attached and after visit summary should his vision change acutely.  Discussed with the family via phone.       Currently he denies any chest pain or tightness or back pain.      Vitals: Blood Pressure: 150/59 (05/13/25 1000)  Pulse: 62 (05/13/25 1000)  Temperature: 98 °F (36.7 °C) (05/13/25 0915)  Temp Source: Oral (05/13/25 0738)  Respirations: 18 (05/13/25 1000)  Height: 6' 1\" (185.4 cm) (05/08/25 0858)  Weight - Scale: 92.6 kg (204 lb 3.2 oz) (05/13/25 0600)  SpO2: 95 % (05/13/25 0738)  Physical Exam  Vitals and nursing note reviewed.   Constitutional:       General: He is not in acute distress.     Appearance: Normal appearance.   HENT:      Head: Normocephalic and atraumatic.      Mouth/Throat:      Mouth: Mucous membranes are moist.   Eyes:      Conjunctiva/sclera: Conjunctivae normal.      Pupils: Pupils are equal, round, and reactive to light.   Cardiovascular:      Rate and Rhythm: Normal rate and regular rhythm.      Pulses: Normal pulses.           Carotid pulses are 2+ on the right side and 2+ on the left side.       Radial pulses are 2+ on the right side and 2+ on the left side.      Heart sounds: Normal heart sounds, S1 normal and S2 normal. No murmur heard.  Pulmonary:      Effort: No tachypnea, bradypnea or accessory muscle usage.      Breath sounds: Normal breath sounds and air entry. No decreased breath sounds, wheezing, rhonchi or rales.   Abdominal:      General: Abdomen is flat. Bowel sounds are normal.   Musculoskeletal:      Right lower leg: No edema.      Left lower leg: No edema.   Neurological:      Mental Status: He is alert and oriented to person, place, and time. Mental status is at baseline.   Psychiatric:         Mood and Affect: Mood normal.         Behavior: Behavior normal.      "     Discussion with Family: Updated  (sister) via phone.    Discharge instructions/Information to patient and family:   See after visit summary for information provided to patient and family.      Provisions for Follow-Up Care:  See after visit summary for information related to follow-up care and any pertinent home health orders.      Mobility at time of Discharge:   Basic Mobility Inpatient Raw Score: 18  JH-HLM Goal: 6: Walk 10 steps or more  JH-HLM Achieved: 5: Stand (1 or more minutes)  HLM Goal achieved. Continue to encourage appropriate mobility.     Disposition:   Acute Rehab at [facility]     Planned Readmission: none     Discharge Medications:  See after visit summary for reconciled discharge medications provided to patient and/or family.      Administrative Statements   Discharge Statement:  I have spent a total time of 45 minutes in caring for this patient on the day of the visit/encounter. >30 minutes of time was spent on: Diagnostic results, Prognosis, Risks and benefits of tx options, and Instructions for management.    **Please Note: This note may have been constructed using a voice recognition system**

## 2025-05-13 NOTE — ASSESSMENT & PLAN NOTE
Volume: Hypervolemic  Blood pressure: Hypertensive, /67mmhg   Low-sodium diet  Ultrafiltration on dialysis  Continue nifedipine 30 mg daily

## 2025-05-13 NOTE — PLAN OF CARE
Problem: Prexisting or High Potential for Compromised Skin Integrity  Goal: Skin integrity is maintained or improved  Description: INTERVENTIONS:- Identify patients at risk for skin breakdown- Assess and monitor skin integrity- Assess and monitor nutrition and hydration status- Monitor labs - Assess for incontinence - Turn and reposition patient- Assist with mobility/ambulation- Relieve pressure over bony prominences- Avoid friction and shearing- Provide appropriate hygiene as needed including keeping skin clean and dry- Evaluate need for skin moisturizer/barrier cream- Collaborate with interdisciplinary team - Patient/family teaching- Consider wound care consult   Outcome: Progressing     Problem: METABOLIC, FLUID AND ELECTROLYTES - ADULT  Goal: Electrolytes maintained within normal limits  Description: INTERVENTIONS:- Monitor labs and assess patient for signs and symptoms of electrolyte imbalances- Administer electrolyte replacement as ordered- Monitor response to electrolyte replacements, including repeat lab results as appropriate- Instruct patient on fluid and nutrition as appropriate  Outcome: Progressing  Goal: Fluid balance maintained  Description: INTERVENTIONS:- Monitor labs - Monitor I/O and WT- Instruct patient on fluid and nutrition as appropriate- Assess for signs & symptoms of volume excess or deficit  Outcome: Progressing     Problem: SKIN/TISSUE INTEGRITY - ADULT  Goal: Skin Integrity remains intact(Skin Breakdown Prevention)  Description: Assess:-Perform Praful assessment every shift-Clean and moisturize skin every shift-Inspect skin when repositioning, toileting, and assisting with ADLS-Assess under medical devices such as brace every shift-Assess extremities for adequate circulation and sensation Bed Management:-Have minimal linens on bed & keep smooth, unwrinkled-Change linens as needed when moist or perspiring-Avoid sitting or lying in one position for more than 2 hours while in bed-Keep HOB  at 30 degrees Toileting:-Offer bedside commode-Assess for incontinence every 2 hrs-Use incontinent care products after each incontinent episode such as wipes Activity:-Mobilize patient 3 times a day-Encourage activity and walks on unit-Encourage or provide ROM exercises -Turn and reposition patient every 2 Hours-Use appropriate equipment to lift or move patient in bed-Instruct/ Assist with weight shifting every 1hr when out of bed in chair-Consider limitation of chair time 3 hour intervalsSkin Care:-Avoid use of baby powder, tape, friction and shearing, hot water or constrictive clothing-Relieve pressure over bony prominences using pillows/ waffle cushion-Do not massage red bony areasNext Steps:-Teach patient strategies to minimize risks such as mobility -Consider consults to  interdisciplinary teams such as OT/PT  Outcome: Progressing  Goal: Incision(s), wounds(s) or drain site(s) healing without S/S of infection  Description: INTERVENTIONS- Assess and document dressing, incision, wound bed, drain sites and surrounding tissue- Provide patient and family education- Perform skin care/dressing changes every shift  Outcome: Progressing     Problem: MUSCULOSKELETAL - ADULT  Goal: Maintain or return mobility to safest level of function  Description: INTERVENTIONS:- Assess patient's ability to carry out ADLs; assess patient's baseline for ADL function and identify physical deficits which impact ability to perform ADLs (bathing, care of mouth/teeth, toileting, grooming, dressing, etc.)- Assess/evaluate cause of self-care deficits - Assess range of motion- Assess patient's mobility- Assess patient's need for assistive devices and provide as appropriate- Encourage maximum independence but intervene and supervise when necessary- Involve family in performance of ADLs- Assess for home care needs following discharge - Consider OT consult to assist with ADL evaluation and planning for discharge- Provide patient education as  appropriate  Outcome: Progressing  Goal: Maintain proper alignment of affected body part  Description: INTERVENTIONS:- Support, maintain and protect limb and body alignment- Provide patient/ family with appropriate education  Outcome: Progressing     Problem: Nutrition/Hydration-ADULT  Goal: Nutrient/Hydration intake appropriate for improving, restoring or maintaining nutritional needs  Description: Monitor and assess patient's nutrition/hydration status for malnutrition. Collaborate with interdisciplinary team and initiate plan and interventions as ordered.  Monitor patient's weight and dietary intake as ordered or per policy. Utilize nutrition screening tool and intervene as necessary. Determine patient's food preferences and provide high-protein, high-caloric foods as appropriate. INTERVENTIONS:- Monitor oral intake, urinary output, labs, and treatment plans- Assess nutrition and hydration status and recommend course of action- Evaluate amount of meals eaten- Assist patient with eating if necessary - Allow adequate time for meals- Recommend/ encourage appropriate diets, oral nutritional supplements, and vitamin/mineral supplements- Order, calculate, and assess calorie counts as needed- Recommend, monitor, and adjust tube feedings and TPN/PPN based on assessed needs- Assess need for intravenous fluids- Provide specific nutrition/hydration education as appropriate- Include patient/family/caregiver in decisions related to nutrition  Outcome: Progressing     Problem: PAIN - ADULT  Goal: Verbalizes/displays adequate comfort level or baseline comfort level  Description: Interventions:- Encourage patient to monitor pain and request assistance- Assess pain using appropriate pain scale- Administer analgesics based on type and severity of pain and evaluate response- Implement non-pharmacological measures as appropriate and evaluate response- Consider cultural and social influences on pain and pain management- Notify  physician/advanced practitioner if interventions unsuccessful or patient reports new pain  Outcome: Progressing     Problem: INFECTION - ADULT  Goal: Absence or prevention of progression during hospitalization  Description: INTERVENTIONS:- Assess and monitor for signs and symptoms of infection- Monitor lab/diagnostic results- Monitor all insertion sites, i.e. indwelling lines, tubes, and drains- Monitor endotracheal if appropriate and nasal secretions for changes in amount and color- [place] appropriate cooling/warming therapies per order- Administer medications as ordered- Instruct and encourage patient and family to use good hand hygiene technique- Identify and instruct in appropriate isolation precautions for identified infection/condition  Outcome: Progressing  Goal: Absence of fever/infection during neutropenic period  Description: INTERVENTIONS:- Monitor WBC  Outcome: Progressing     Problem: SAFETY ADULT  Goal: Patient will remain free of falls  Description: INTERVENTIONS:- Educate patient/family on patient safety including physical limitations- Instruct patient to call for assistance with activity - Consult OT/PT to assist with strengthening/mobility - Keep Call bell within reach- Keep bed low and locked with side rails adjusted as appropriate- Keep care items and personal belongings within reach- Initiate and maintain comfort rounds- Make Fall Risk Sign visible to staff- Offer Toileting every 2 Hours, in advance of need- Initiate/Maintain bed alarm- Obtain necessary fall risk management equipment: non skid socks- Apply yellow socks and bracelet for high fall risk patients- Consider moving patient to room near nurses station  Outcome: Progressing  Goal: Maintain or return to baseline ADL function  Description: INTERVENTIONS:-  Assess patient's ability to carry out ADLs; assess patient's baseline for ADL function and identify physical deficits which impact ability to perform ADLs (bathing, care of  mouth/teeth, toileting, grooming, dressing, etc.)- Assess/evaluate cause of self-care deficits - Assess range of motion- Assess patient's mobility; develop plan if impaired- Assess patient's need for assistive devices and provide as appropriate- Encourage maximum independence but intervene and supervise when necessary- Involve family in performance of ADLs- Assess for home care needs following discharge - Consider OT consult to assist with ADL evaluation and planning for discharge- Provide patient education as appropriate  Outcome: Progressing  Goal: Maintains/Returns to pre admission functional level  Description: INTERVENTIONS:- Perform AM-PAC 6 Click Basic Mobility/ Daily Activity assessment daily.- Set and communicate daily mobility goal to care team and patient/family/caregiver. - Collaborate with rehabilitation services on mobility goals if consulted- Perform Range of Motion 3 times a day.- Reposition patient every 2 hours.- Dangle patient 3 times a day- Stand patient 3 times a day- Ambulate patient 3 times a day- Out of bed to chair 3 times a day - Out of bed for meals 3 times a day- Out of bed for toileting- Record patient progress and toleration of activity level   Outcome: Progressing     Problem: DISCHARGE PLANNING  Goal: Discharge to home or other facility with appropriate resources  Description: INTERVENTIONS:- Identify barriers to discharge w/patient and caregiver- Arrange for needed discharge resources and transportation as appropriate- Identify discharge learning needs (meds, wound care, etc.)- Arrange for interpretive services to assist at discharge as needed- Refer to Case Management Department for coordinating discharge planning if the patient needs post-hospital services based on physician/advanced practitioner order or complex needs related to functional status, cognitive ability, or social support system  Outcome: Progressing     Problem: Knowledge Deficit  Goal: Patient/family/caregiver  demonstrates understanding of disease process, treatment plan, medications, and discharge instructions  Description: Complete learning assessment and assess knowledge base.Interventions:- Provide teaching at level of understanding- Provide teaching via preferred learning methods  Outcome: Progressing

## 2025-05-13 NOTE — CASE MANAGEMENT
Case Management Discharge Planning Note    Patient name Naresh Carpio  Location [address]-* MRN [id number]  : 1960 Date 2025       Current Admission Date: 2025  Current Admission Diagnosis:Ambulatory dysfunction   Patient Active Problem List    Diagnosis Date Noted Date Diagnosed    Ambulatory dysfunction 2025     Retinopathy 2025     At risk for acid-base imbalance 2025     Electrolyte imbalance risk 2025     Hallucinations 2025     Type 2 diabetes mellitus with foot ulcer, without long-term current use of insulin ([facility]) 2025     Insomnia 2025     Hyperphosphatemia 2025     Wound of skin 2025     At high risk for skin breakdown 2025     At risk for venous thromboembolism (VTE) 2025     Impaired mobility and activities of daily living 2025     Visual disturbance 2025     Pseudoaneurysm ([facility]) 2025     Acute pain due to trauma 2025     ESRD on dialysis ([facility]) 2025     Wounds, multiple 2025     Traumatic retroperitoneal hemorrhage 2025     Secondary hyperparathyroidism ([facility]) 2025     At risk for electrolyte imbalance 2025     Multiple open wounds of foot 2025     Disorder of acid-base balance 2025     L2 vertebral fracture ([facility]) 2025     Non-compliance with treatment 2025     Generalized weakness 2025     Gout 2025     Closed fracture of proximal end of left humerus with routine healing 02/10/2025     Left shoulder pain 2025     ESRD (end stage renal disease) on dialysis ([facility]) 2025     Primary hypertension 2025     Anemia in ESRD (end-stage renal disease)  ([facility]) 2025     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, on chronic dialysis ([facility]) 2025     Renal lesion 2024     Chronic wound 2024     Chronic diastolic (congestive) heart failure ([facility])  11/19/2024     Pleural effusion 11/19/2024     Paroxysmal atrial fibrillation (HCC)      ESRD (end stage renal disease) ([facility]) 10/25/2024     Hyponatremia 10/19/2024     Acute on chronic anemia 10/14/2024     PAD (peripheral artery disease) ([facility]) 10/08/2024     Hyperkalemia 04/06/2024     Difficulty with speech 03/19/2024     History of amputation of hallux ([facility]) 03/18/2024     At risk for constipation 09/06/2023     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 mellitus, with necrosis of bone ([facility])      Acquired deformity of foot, right      Charcot's joint      Open wound of right foot 08/21/2023     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 mellitus, with bone involvement without evidence of necrosis ([facility])      Diabetic ulcer of left midfoot associated with type 2 diabetes mellitus, limited to breakdown of skin ([facility])      Diabetic polyneuropathy associated with type 2 diabetes mellitus ([facility])      Diabetes mellitus type 2 with peripheral artery disease ([facility])      History of amputation of lesser toe of left foot ([facility])      Onychomycosis      Traumatic retroperitoneal hematoma 08/19/2023     Osteoarthritis of left hip 07/27/2022     Severe Left Orchalgia 07/26/2022     Right shoulder pain 07/26/2022     Left hip pain 07/06/2022     Hemodialysis status ([facility])      Hypervolemia      Chronic anemia 01/21/2022     History of prediabetes      Essential hypertension      History of TIA (transient ischemic attack)      T10 vertebral fracture ([facility])        LOS (days): 4  Geometric Mean LOS (GMLOS) (days): 3.8  Days to GMLOS:0     OBJECTIVE:  Risk of Unplanned Readmission Score: 66.2         Current admission status: Inpatient   Preferred Pharmacy:   [id number] - [place], [place] - [address]  [address]  [facility] [address]  Phone: [phone] Fax: [phone]    [id number] - [place], [place] - [address]  [address]  [address]  [facility] [address]  Phone: [phone] Fax: [phone]    Primary Care Provider: Jeffrey Jackson    Primary Insurance: MEDICARE  Secondary Insurance:     DISCHARGE DETAILS:    Discharge planning discussed with:: Sister Alicja NAVAS contacted family/caregiver?: Yes (VM left for sister advising of planned discharge and requested  her to call back with any questions.)    Contacts  Patient Contacts: Alicja Carpio (Sister)  Relationship to Patient:: Family  Contact Method: Phone  Phone Number: [phone]  Reason/Outcome: Emergency Contact, Continuity of Care, Discharge Planning    Other Referral/Resources/Interventions Provided:  Interventions: Short Term Rehab, Transportation, Other (Specify)  Referral Comments: Call made to [facility]-urg. SW spoke with rep [employer] to advise patient to admit to [facility] today for STR and will resume in-clinic HD on Thursday. Patient requested SW assistance in scheduling OP f/u appt with opthamologist Dr. Chaudhry. Appt scheduled for 5/21 at 1400. Information on AVS. [facility] admissions confirmed they can accept patient today and have been notified of scheduled pickup time.     Treatment Team Recommendation: Short Term Rehab  Discharge Destination Plan:: Short Term Rehab  Transport at Discharge : Wheelchair van     Number/Name of Dispatcher: [employer]  Transported by (Company and Unit #): [employer]  ETA of Transport (Date): 05/13/25  ETA of Transport (Time): 1530    Accepting Facility Name, City & State : [facility]  Receiving Facility/Agency Phone Number: [phone]

## 2025-05-16 ENCOUNTER — OFFICE VISIT (OUTPATIENT)
Dept: VASCULAR SURGERY | Facility: CLINIC | Age: 65
End: 2025-05-16
Payer: MEDICARE

## 2025-05-16 VITALS
BODY MASS INDEX: 26.94 KG/M2 | HEIGHT: 73 IN | SYSTOLIC BLOOD PRESSURE: 150 MMHG | DIASTOLIC BLOOD PRESSURE: 50 MMHG | HEART RATE: 72 BPM

## 2025-05-16 DIAGNOSIS — I73.9 PAD (PERIPHERAL ARTERY DISEASE) (HCC): Primary | ICD-10-CM

## 2025-05-16 PROCEDURE — 99215 OFFICE O/P EST HI 40 MIN: CPT | Performed by: SURGERY

## 2025-05-16 RX ORDER — ASPIRIN 81 MG/1
81 TABLET, CHEWABLE ORAL DAILY
Qty: 30 TABLET | Refills: 3 | Status: SHIPPED | OUTPATIENT
Start: 2025-05-16

## 2025-05-16 NOTE — ASSESSMENT & PLAN NOTE
64-year-old male presents for follow-up for PAD.  He had a fall at home was admitted to [facility] last month.  He sustained a cut to the medial aspect of his right foot during that fall and while he was in the hospital vascular surgery was consulted for his right lower extremity foot wound.  He had a lead as well as an SALVATORE done while he was in the hospital this demonstrated severe bilateral popliteal stenosis ABIs were noncompressible but toe pressures were 100 on the right and 70 on the left.  At that time because the wounds were small and were not arterial wounds per se and the fact that he had adequate perfusion for wound healing no angiogram was done.  Should be noted that 2 years ago he did have a diagnostic right lower extremity angiogram done with interventional radiology.  At this time he is in a facility was recently discharged from the hospital his wound is improving he has no wounds on his left foot.  He does have stigmata from venous insufficiency in both legs he has hyperpigmentation and edema in both his right and left calf.  I did not recommend an angiogram at this time his wound is healing without intervention I would expect it to completely heal.  I did recommend graded compression stockings 15-20 bilaterally we will plan to follow him with an annual arterial duplex and office visit with our URSULA.  I did recommend he resume taking a baby aspirin every day.    Orders:    Compression Stocking    VAS ARTERIAL DUPLEX- LOWER LIMB BILATERAL; Future    aspirin 81 mg chewable tablet; Chew 1 tablet (81 mg total) daily

## 2025-05-16 NOTE — PROGRESS NOTES
Name: Naresh Carpio      : 1960      MRN: [id number]  Encounter Provider: Joey Gurrola DO  Encounter Date: 2025   Encounter department: [facility]  :  Assessment & Plan  PAD (peripheral artery disease) (HCC)  64-year-old male presents for follow-up for PAD.  He had a fall at home was admitted to [facility] last month.  He sustained a cut to the medial aspect of his right foot during that fall and while he was in the hospital vascular surgery was consulted for his right lower extremity foot wound.  He had a lead as well as an SALVATORE done while he was in the hospital this demonstrated severe bilateral popliteal stenosis ABIs were noncompressible but toe pressures were 100 on the right and 70 on the left.  At that time because the wounds were small and were not arterial wounds per se and the fact that he had adequate perfusion for wound healing no angiogram was done.  Should be noted that 2 years ago he did have a diagnostic right lower extremity angiogram done with interventional radiology.  At this time he is in a facility was recently discharged from the hospital his wound is improving he has no wounds on his left foot.  He does have stigmata from venous insufficiency in both legs he has hyperpigmentation and edema in both his right and left calf.  I did not recommend an angiogram at this time his wound is healing without intervention I would expect it to completely heal.  I did recommend graded compression stockings 15-20 bilaterally we will plan to follow him with an annual arterial duplex and office visit with our URSULA.  I did recommend he resume taking a baby aspirin every day.    Orders:    Compression Stocking    VAS ARTERIAL DUPLEX- LOWER LIMB BILATERAL; Future    aspirin 81 mg chewable tablet; Chew 1 tablet (81 mg total) daily        History of Present Illness     New patient, referred for PAD and presents today for evaluation. BIJAL 3/28/25 and SALVATORE 25. Ambulates with walker.  "Denies any claudication at his level of activity.     HPI  Naresh Carpio is a 64 y.o. male   History obtained from: patient    Review of Systems   Constitutional: Negative.    HENT: Negative.     Eyes:  Positive for visual disturbance.   Respiratory:  Positive for shortness of breath.    Cardiovascular:  Positive for leg swelling.   Gastrointestinal: Negative.    Endocrine: Negative.    Genitourinary: Negative.    Musculoskeletal:  Positive for gait problem.   Skin: Negative.    Allergic/Immunologic: Negative.    Hematological: Negative.    Psychiatric/Behavioral: Negative.       I have reviewed the ROS above and made changes as needed.         Objective   /50 (BP Location: Left arm, Patient Position: Sitting)   Pulse 72   Ht 6' 1\" (1.854 m)   BMI 26.94 kg/m²      Physical Exam    General  Exam: alert, awake, oriented, no distress, consistent with stated age    Chest and Lung  Exam: chest normal without deformity, bilaterally expansive, clear to auscultation    Cardiovascular  Exam: regular rate, regular rhythm, no murmurs, no rubs or gallops    Adbomen  Exam: soft, non-tender, non-distended, no pulsatile abdominal masses, no abdominal bruit      Lower Extremity:  Palpation: Femoral pulse- Bilateral 2+              Distal pulses non-palpable   Right medial foot partial thickness wound 1cm   No LLE tissue loss     BLE non-pitting calf edema and hyperpigmentation of calfs    Neurologic  Exam:alert, non-focal, oriented x 3      Administrative Statements   I have spent a total time of 30 minutes in caring for this patient on the day of the visit/encounter including Counseling / Coordination of care, Documenting in the medical record, and Reviewing/placing orders in the medical record (including tests, medications, and/or procedures).  "

## 2025-05-30 ENCOUNTER — OFFICE VISIT (OUTPATIENT)
Age: 65
End: 2025-05-30
Payer: MEDICARE

## 2025-05-30 DIAGNOSIS — E11.3513 PROLIFERATIVE DIABETIC RETINOPATHY OF BOTH EYES WITH MACULAR EDEMA ASSOCIATED WITH TYPE 2 DIABETES MELLITUS (HCC): Primary | ICD-10-CM

## 2025-05-30 DIAGNOSIS — H25.13 NUCLEAR SCLEROSIS OF BOTH EYES: ICD-10-CM

## 2025-05-30 DIAGNOSIS — H43.13 VITREOUS HEMORRHAGE OF BOTH EYES (HCC): ICD-10-CM

## 2025-05-30 PROCEDURE — 67028 INJECTION EYE DRUG: CPT | Performed by: OPHTHALMOLOGY

## 2025-05-30 PROCEDURE — 76512 OPH US DX B-SCAN: CPT | Performed by: OPHTHALMOLOGY

## 2025-05-30 PROCEDURE — 99204 OFFICE O/P NEW MOD 45 MIN: CPT | Performed by: OPHTHALMOLOGY

## 2025-05-30 RX ORDER — BEVACIZUMAB 1.25MG/.05
1.25 SYRINGE (ML) INTRAOCULAR
Status: COMPLETED | OUTPATIENT
Start: 2025-05-30 | End: 2025-05-30

## 2025-05-30 RX ADMIN — Medication 1.25 MG: at 15:42

## 2025-05-30 NOTE — PROGRESS NOTES
Name: Naresh Carpio      : 1960      MRN: [id number]  Encounter Provider: Chaya Zapata MD  Encounter Date: 2025   Encounter department: [facility] OPHTHALMOLOGY  :  Assessment & Plan  Proliferative diabetic retinopathy of both eyes with macular edema associated with type 2 diabetes mellitus (HCC)  Recommend intravitreal Avastin OD#1; Pt understands the risks and benefits and elects to proceed with the treatment plan;  F/u in 1 week Avastin OS;     Lab Results   Component Value Date    HGBA1C 5.7 (H) 2025       Orders:    Intravitreal Injection, Pharmacologic Agent - OD - Right Eye    bevacizumab (Avastin) intravitreal injection 1.25 mg    Vitreous hemorrhage of both eyes (HCC)    Orders:    B-Scan Ultrasound - OD - Right Eye    Nuclear sclerosis of both eyes                 Naresh Carpio is a 64 y.o. male who presents for retina evaluation.    Patient was referred by Dr. Chaudhry for retina evaluation.    Patient states that vision stared changing in the right eye for about 30 days.    Patient states Isidoro advised patient that he just needed cat extraction OD.    Patient states he is looking for vision to improve.    Patient is diabetic. DM is controlled. Last A1c was 5.5. Patient is no longer on medication.    History obtained from: patient    Review of Systems  Medical History Reviewed by provider this encounter:  Tobacco  Allergies  Meds  Problems  Med Hx  Surg Hx  Fam Hx     .     Past Ocular History: Cat OU.  Ocular Meds/Drops: None    Base Eye Exam       Visual Acuity (Snellen - Linear)         Right Left    Dist sc HM CF              Tonometry (Tonopen, 12:35 PM)         Right Left    Pressure 14 13              Pupils         Pupils APD    Right PERRL None    Left PERRL None              Visual Fields         Left Right    Restrictions Total superior temporal, inferior temporal, superior nasal, inferior nasal deficiencies Total superior temporal, inferior temporal, superior  nasal, inferior nasal deficiencies   Can see HM OD temp             Neuro/Psych       Oriented x3: Yes    Mood/Affect: Agitated              Dilation       Both eyes: 2.5% Phenylephrine, 1% Tropicamide @ 12:36 PM                  Slit Lamp and Fundus Exam       External Exam         Right Left    External Normal Normal              Slit Lamp Exam         Right Left    Lids/Lashes Normal Normal    Conjunctiva/Sclera White and quiet White and quiet    Cornea Clear Clear    Anterior Chamber Deep and quiet Deep and quiet    Iris Round and reactive Round and reactive    Lens 2+ NSC with 3+ cortical changes 2+ NSC with 3+ cortical changes    Anterior Vitreous 3+ vitreous hemorrhage 1+ vitreous hemorrhage; subhyaloid hemorrhage              Fundus Exam         Right Left    Disc  sharp, no pallor    C/D Ratio  0.25    Macula  microaneurysms; DME    Vessels  attenuated    Periphery  Dot blot heme; NVE                      IMAGING:  Intravitreal Injection, Pharmacologic Agent - OD - Right Eye          Time Out  5/30/2025. 3:15 PM. Confirmed correct patient, procedure, site, and patient consented.     Anesthesia  Topical anesthesia was used. Anesthetic medications included Proparacaine 0.5%, Tetracaine 0.5%.     Procedure  Preparation included 5% betadine to ocular surface, eyelid speculum. A 30 gauge needle was used.     Injection:  1.25 mg bevacizumab 1.25 MG/0.05ML    Route: Intravitreal, Site: Right Eye    NDC: [address]-6, Lot: [id number], Expiration date: 8/14/2025     Post-op  Post injection exam found visual acuity of at least counting fingers, no retinal detachment, perfused optic nerve. The patient tolerated the procedure well. There were no complications. The patient received written and verbal post procedure care education. Post injection medications were not given.          B-Scan Ultrasound - OD - Right Eye          Quality was good. Findings included vitreous hemorrhage.

## 2025-06-04 ENCOUNTER — APPOINTMENT (OUTPATIENT)
Dept: RADIOLOGY | Facility: HOSPITAL | Age: 65
DRG: 177 | End: 2025-06-04
Payer: MEDICARE

## 2025-06-04 ENCOUNTER — APPOINTMENT (EMERGENCY)
Dept: RADIOLOGY | Facility: HOSPITAL | Age: 65
DRG: 177 | End: 2025-06-04
Payer: MEDICARE

## 2025-06-04 ENCOUNTER — APPOINTMENT (OUTPATIENT)
Dept: DIALYSIS | Facility: HOSPITAL | Age: 65
DRG: 177 | End: 2025-06-04
Payer: MEDICARE

## 2025-06-04 ENCOUNTER — APPOINTMENT (OUTPATIENT)
Dept: NON INVASIVE DIAGNOSTICS | Facility: HOSPITAL | Age: 65
DRG: 177 | End: 2025-06-04
Payer: MEDICARE

## 2025-06-04 ENCOUNTER — PREP FOR PROCEDURE (OUTPATIENT)
Dept: CARDIOLOGY CLINIC | Facility: CLINIC | Age: 65
End: 2025-06-04

## 2025-06-04 ENCOUNTER — HOSPITAL ENCOUNTER (INPATIENT)
Facility: HOSPITAL | Age: 65
LOS: 8 days | Discharge: NURSING, REHAB OR LONG-TERM CARE | DRG: 177 | End: 2025-06-13
Attending: EMERGENCY MEDICINE | Admitting: FAMILY MEDICINE
Payer: MEDICARE

## 2025-06-04 DIAGNOSIS — J96.91 HYPOXIC RESPIRATORY FAILURE (HCC): ICD-10-CM

## 2025-06-04 DIAGNOSIS — N18.6 ESRD (END STAGE RENAL DISEASE) (HCC): ICD-10-CM

## 2025-06-04 DIAGNOSIS — I24.89 SUBENDOCARDIAL ISCHEMIA (HCC): ICD-10-CM

## 2025-06-04 DIAGNOSIS — R79.89 ELEVATED TROPONIN: ICD-10-CM

## 2025-06-04 DIAGNOSIS — J81.1 PULMONARY EDEMA: ICD-10-CM

## 2025-06-04 DIAGNOSIS — I21.4 NSTEMI, INITIAL EPISODE OF CARE (HCC): Primary | ICD-10-CM

## 2025-06-04 DIAGNOSIS — Z99.2 DEPENDENCE ON RENAL DIALYSIS (HCC): ICD-10-CM

## 2025-06-04 DIAGNOSIS — J96.12 CHRONIC HYPERCAPNIC RESPIRATORY FAILURE (HCC): ICD-10-CM

## 2025-06-04 DIAGNOSIS — H53.9 VISUAL DISTURBANCE: ICD-10-CM

## 2025-06-04 DIAGNOSIS — I21.4 NSTEMI, INITIAL EPISODE OF CARE (HCC): ICD-10-CM

## 2025-06-04 DIAGNOSIS — K21.9 GERD (GASTROESOPHAGEAL REFLUX DISEASE): ICD-10-CM

## 2025-06-04 DIAGNOSIS — G89.11 ACUTE PAIN DUE TO TRAUMA: ICD-10-CM

## 2025-06-04 DIAGNOSIS — R60.0 LEG EDEMA: Primary | ICD-10-CM

## 2025-06-04 PROBLEM — N18.9 CHRONIC KIDNEY DISEASE-MINERAL AND BONE DISORDER: Status: ACTIVE | Noted: 2025-01-16

## 2025-06-04 PROBLEM — E87.70 VOLUME OVERLOAD: Status: ACTIVE | Noted: 2024-11-19

## 2025-06-04 LAB
2HR DELTA HS TROPONIN: 4580 NG/L
4HR DELTA HS TROPONIN: ABNORMAL NG/L
ANION GAP SERPL CALCULATED.3IONS-SCNC: 12 MMOL/L (ref 4–13)
AORTIC ROOT: 3.8 CM
APTT PPP: 27 SECONDS (ref 23–34)
APTT PPP: 49 SECONDS (ref 23–34)
APTT PPP: 50 SECONDS (ref 23–34)
ATRIAL RATE: 86 BPM
AV LVOT PEAK GRADIENT: 2 MMHG
AV PEAK GRADIENT: 11 MMHG
BACTERIA UR QL AUTO: NORMAL /HPF
BASE EX.OXY STD BLDV CALC-SCNC: 48 % (ref 60–80)
BASE EXCESS BLDV CALC-SCNC: 0.5 MMOL/L
BASOPHILS # BLD AUTO: 0.02 THOUSANDS/ÂΜL (ref 0–0.1)
BASOPHILS NFR BLD AUTO: 0 % (ref 0–1)
BILIRUB UR QL STRIP: NEGATIVE
BNP SERPL-MCNC: 2283 PG/ML (ref 0–100)
BSA FOR ECHO PROCEDURE: 2.15 M2
BUN SERPL-MCNC: 40 MG/DL (ref 5–25)
CALCIUM SERPL-MCNC: 9 MG/DL (ref 8.4–10.2)
CARDIAC TROPONIN I PNL SERPL HS: 1304 NG/L (ref ?–50)
CARDIAC TROPONIN I PNL SERPL HS: 5884 NG/L (ref ?–50)
CARDIAC TROPONIN I PNL SERPL HS: ABNORMAL NG/L (ref ?–50)
CHLORIDE SERPL-SCNC: 91 MMOL/L (ref 96–108)
CHOLEST SERPL-MCNC: 78 MG/DL (ref ?–200)
CLARITY UR: CLEAR
CO2 SERPL-SCNC: 27 MMOL/L (ref 21–32)
COLOR UR: ABNORMAL
CREAT SERPL-MCNC: 7 MG/DL (ref 0.6–1.3)
DOP CALC LVOT AREA: 3.8 CM2
DOP CALC LVOT DIAMETER: 2.2 CM
E WAVE DECELERATION TIME: 110 MS
E/A RATIO: 0.95
EOSINOPHIL # BLD AUTO: 0 THOUSAND/ÂΜL (ref 0–0.61)
EOSINOPHIL NFR BLD AUTO: 0 % (ref 0–6)
ERYTHROCYTE [DISTWIDTH] IN BLOOD BY AUTOMATED COUNT: 14.9 % (ref 11.6–15.1)
FLUAV AG UPPER RESP QL IA.RAPID: NEGATIVE
FLUBV AG UPPER RESP QL IA.RAPID: NEGATIVE
FRACTIONAL SHORTENING: 23 (ref 28–44)
GFR SERPL CREATININE-BSD FRML MDRD: 7 ML/MIN/1.73SQ M
GLUCOSE SERPL-MCNC: 103 MG/DL (ref 65–140)
GLUCOSE SERPL-MCNC: 112 MG/DL (ref 65–140)
GLUCOSE SERPL-MCNC: 118 MG/DL (ref 65–140)
GLUCOSE SERPL-MCNC: 139 MG/DL (ref 65–140)
GLUCOSE SERPL-MCNC: 86 MG/DL (ref 65–140)
GLUCOSE UR STRIP-MCNC: NEGATIVE MG/DL
HCO3 BLDV-SCNC: 26.9 MMOL/L (ref 24–30)
HCT VFR BLD AUTO: 31.8 % (ref 36.5–49.3)
HDLC SERPL-MCNC: 32 MG/DL
HGB BLD-MCNC: 9.6 G/DL (ref 12–17)
HGB UR QL STRIP.AUTO: ABNORMAL
IMM GRANULOCYTES # BLD AUTO: 0.06 THOUSAND/UL (ref 0–0.2)
IMM GRANULOCYTES NFR BLD AUTO: 1 % (ref 0–2)
INR PPP: 1.12 (ref 0.85–1.19)
INTERVENTRICULAR SEPTUM IN DIASTOLE (PARASTERNAL SHORT AXIS VIEW): 1.2 CM
INTERVENTRICULAR SEPTUM: 1.2 CM (ref 0.6–1.1)
KETONES UR STRIP-MCNC: NEGATIVE MG/DL
LACTATE SERPL-SCNC: 1.8 MMOL/L (ref 0.5–2)
LDLC SERPL CALC-MCNC: 28 MG/DL (ref 0–100)
LEFT ATRIUM SIZE: 4.7 CM
LEFT INTERNAL DIMENSION IN SYSTOLE: 3.7 CM (ref 2.1–4)
LEFT VENTRICULAR INTERNAL DIMENSION IN DIASTOLE: 4.8 CM (ref 3.5–6)
LEFT VENTRICULAR POSTERIOR WALL IN END DIASTOLE: 1.3 CM
LEFT VENTRICULAR STROKE VOLUME: 50 ML
LEUKOCYTE ESTERASE UR QL STRIP: NEGATIVE
LVSV (TEICH): 50 ML
LYMPHOCYTES # BLD AUTO: 0.42 THOUSANDS/ÂΜL (ref 0.6–4.47)
LYMPHOCYTES NFR BLD AUTO: 5 % (ref 14–44)
MCH RBC QN AUTO: 29.9 PG (ref 26.8–34.3)
MCHC RBC AUTO-ENTMCNC: 30.2 G/DL (ref 31.4–37.4)
MCV RBC AUTO: 99 FL (ref 82–98)
MONOCYTES # BLD AUTO: 0.75 THOUSAND/ÂΜL (ref 0.17–1.22)
MONOCYTES NFR BLD AUTO: 9 % (ref 4–12)
MV E'TISSUE VEL-LAT: 6 CM/S
MV E'TISSUE VEL-SEP: 6 CM/S
MV PEAK A VEL: 0.91 M/S
MV PEAK E VEL: 86 CM/S
MV STENOSIS PRESSURE HALF TIME: 32 MS
MV VALVE AREA P 1/2 METHOD: 6.88
NEUTROPHILS # BLD AUTO: 7.28 THOUSANDS/ÂΜL (ref 1.85–7.62)
NEUTS SEG NFR BLD AUTO: 85 % (ref 43–75)
NITRITE UR QL STRIP: NEGATIVE
NON-SQ EPI CELLS URNS QL MICRO: NORMAL /HPF
NONHDLC SERPL-MCNC: 46 MG/DL
NRBC BLD AUTO-RTO: 0 /100 WBCS
O2 CT BLDV-SCNC: 7 ML/DL
P AXIS: 47 DEGREES
PCO2 BLDV: 52.2 MM HG (ref 42–50)
PH BLDV: 7.33 [PH] (ref 7.3–7.4)
PH UR STRIP.AUTO: 7.5 [PH]
PLATELET # BLD AUTO: 218 THOUSANDS/UL (ref 149–390)
PMV BLD AUTO: 8.9 FL (ref 8.9–12.7)
PO2 BLDV: 30 MM HG (ref 35–45)
POTASSIUM SERPL-SCNC: 5.4 MMOL/L (ref 3.5–5.3)
PR INTERVAL: 160 MS
PROT UR STRIP-MCNC: ABNORMAL MG/DL
PROTHROMBIN TIME: 14.9 SECONDS (ref 12.3–15)
QRS AXIS: 18 DEGREES
QRSD INTERVAL: 74 MS
QT INTERVAL: 372 MS
QTC INTERVAL: 445 MS
RBC # BLD AUTO: 3.21 MILLION/UL (ref 3.88–5.62)
RBC #/AREA URNS AUTO: NORMAL /HPF
SARS-COV+SARS-COV-2 AG RESP QL IA.RAPID: POSITIVE
SL CV PED ECHO LEFT VENTRICLE DIASTOLIC VOLUME (MOD BIPLANE) 2D: 109 ML
SL CV PED ECHO LEFT VENTRICLE SYSTOLIC VOLUME (MOD BIPLANE) 2D: 59 ML
SODIUM SERPL-SCNC: 130 MMOL/L (ref 135–147)
SP GR UR STRIP.AUTO: 1.02 (ref 1–1.03)
T WAVE AXIS: 164 DEGREES
TR MAX PG: 21 MMHG
TR PEAK VELOCITY: 2.3 M/S
TRICUSPID ANNULAR PLANE SYSTOLIC EXCURSION: 1.9 CM
TRICUSPID VALVE PEAK REGURGITATION VELOCITY: 2.28 M/S
TRIGL SERPL-MCNC: 90 MG/DL (ref ?–150)
UROBILINOGEN UR STRIP-ACNC: <2 MG/DL
VENTRICULAR RATE: 86 BPM
WBC # BLD AUTO: 8.53 THOUSAND/UL (ref 4.31–10.16)
WBC #/AREA URNS AUTO: NORMAL /HPF

## 2025-06-04 PROCEDURE — 87811 SARS-COV-2 COVID19 W/OPTIC: CPT | Performed by: EMERGENCY MEDICINE

## 2025-06-04 PROCEDURE — 94660 CPAP INITIATION&MGMT: CPT

## 2025-06-04 PROCEDURE — 93306 TTE W/DOPPLER COMPLETE: CPT

## 2025-06-04 PROCEDURE — 5A1D70Z PERFORMANCE OF URINARY FILTRATION, INTERMITTENT, LESS THAN 6 HOURS PER DAY: ICD-10-PCS | Performed by: INTERNAL MEDICINE

## 2025-06-04 PROCEDURE — 85610 PROTHROMBIN TIME: CPT | Performed by: EMERGENCY MEDICINE

## 2025-06-04 PROCEDURE — 82948 REAGENT STRIP/BLOOD GLUCOSE: CPT

## 2025-06-04 PROCEDURE — 99291 CRITICAL CARE FIRST HOUR: CPT | Performed by: EMERGENCY MEDICINE

## 2025-06-04 PROCEDURE — 87147 CULTURE TYPE IMMUNOLOGIC: CPT | Performed by: FAMILY MEDICINE

## 2025-06-04 PROCEDURE — 80061 LIPID PANEL: CPT | Performed by: FAMILY MEDICINE

## 2025-06-04 PROCEDURE — 83880 ASSAY OF NATRIURETIC PEPTIDE: CPT | Performed by: FAMILY MEDICINE

## 2025-06-04 PROCEDURE — 85025 COMPLETE CBC W/AUTO DIFF WBC: CPT | Performed by: EMERGENCY MEDICINE

## 2025-06-04 PROCEDURE — 85730 THROMBOPLASTIN TIME PARTIAL: CPT | Performed by: FAMILY MEDICINE

## 2025-06-04 PROCEDURE — 85730 THROMBOPLASTIN TIME PARTIAL: CPT | Performed by: EMERGENCY MEDICINE

## 2025-06-04 PROCEDURE — 93306 TTE W/DOPPLER COMPLETE: CPT | Performed by: INTERNAL MEDICINE

## 2025-06-04 PROCEDURE — 83036 HEMOGLOBIN GLYCOSYLATED A1C: CPT | Performed by: FAMILY MEDICINE

## 2025-06-04 PROCEDURE — 94640 AIRWAY INHALATION TREATMENT: CPT

## 2025-06-04 PROCEDURE — 94760 N-INVAS EAR/PLS OXIMETRY 1: CPT

## 2025-06-04 PROCEDURE — 99215 OFFICE O/P EST HI 40 MIN: CPT | Performed by: INTERNAL MEDICINE

## 2025-06-04 PROCEDURE — XW033E5 INTRODUCTION OF REMDESIVIR ANTI-INFECTIVE INTO PERIPHERAL VEIN, PERCUTANEOUS APPROACH, NEW TECHNOLOGY GROUP 5: ICD-10-PCS | Performed by: INTERNAL MEDICINE

## 2025-06-04 PROCEDURE — 80048 BASIC METABOLIC PNL TOTAL CA: CPT | Performed by: EMERGENCY MEDICINE

## 2025-06-04 PROCEDURE — 93010 ELECTROCARDIOGRAM REPORT: CPT | Performed by: INTERNAL MEDICINE

## 2025-06-04 PROCEDURE — 87804 INFLUENZA ASSAY W/OPTIC: CPT | Performed by: EMERGENCY MEDICINE

## 2025-06-04 PROCEDURE — 71045 X-RAY EXAM CHEST 1 VIEW: CPT

## 2025-06-04 PROCEDURE — 83605 ASSAY OF LACTIC ACID: CPT | Performed by: EMERGENCY MEDICINE

## 2025-06-04 PROCEDURE — 87040 BLOOD CULTURE FOR BACTERIA: CPT | Performed by: EMERGENCY MEDICINE

## 2025-06-04 PROCEDURE — 93970 EXTREMITY STUDY: CPT | Performed by: SURGERY

## 2025-06-04 PROCEDURE — 87081 CULTURE SCREEN ONLY: CPT | Performed by: FAMILY MEDICINE

## 2025-06-04 PROCEDURE — 81001 URINALYSIS AUTO W/SCOPE: CPT | Performed by: EMERGENCY MEDICINE

## 2025-06-04 PROCEDURE — 82805 BLOOD GASES W/O2 SATURATION: CPT | Performed by: EMERGENCY MEDICINE

## 2025-06-04 PROCEDURE — 99214 OFFICE O/P EST MOD 30 MIN: CPT | Performed by: INTERNAL MEDICINE

## 2025-06-04 PROCEDURE — 90999 UNLISTED DIALYSIS PROCEDURE: CPT | Performed by: INTERNAL MEDICINE

## 2025-06-04 PROCEDURE — 84484 ASSAY OF TROPONIN QUANT: CPT | Performed by: EMERGENCY MEDICINE

## 2025-06-04 PROCEDURE — 93005 ELECTROCARDIOGRAM TRACING: CPT

## 2025-06-04 PROCEDURE — G0257 UNSCHED DIALYSIS ESRD PT HOS: HCPCS

## 2025-06-04 PROCEDURE — 36415 COLL VENOUS BLD VENIPUNCTURE: CPT | Performed by: EMERGENCY MEDICINE

## 2025-06-04 PROCEDURE — 96374 THER/PROPH/DIAG INJ IV PUSH: CPT

## 2025-06-04 PROCEDURE — 94002 VENT MGMT INPAT INIT DAY: CPT

## 2025-06-04 PROCEDURE — 99223 1ST HOSP IP/OBS HIGH 75: CPT | Performed by: FAMILY MEDICINE

## 2025-06-04 PROCEDURE — 99285 EMERGENCY DEPT VISIT HI MDM: CPT

## 2025-06-04 PROCEDURE — 93970 EXTREMITY STUDY: CPT

## 2025-06-04 RX ORDER — IPRATROPIUM BROMIDE AND ALBUTEROL SULFATE 2.5; .5 MG/3ML; MG/3ML
3 SOLUTION RESPIRATORY (INHALATION)
COMMUNITY

## 2025-06-04 RX ORDER — FUROSEMIDE 20 MG/1
20 TABLET ORAL 2 TIMES DAILY
COMMUNITY

## 2025-06-04 RX ORDER — DEXAMETHASONE SODIUM PHOSPHATE 10 MG/ML
6 INJECTION, SOLUTION INTRAMUSCULAR; INTRAVENOUS EVERY 24 HOURS
Status: DISCONTINUED | OUTPATIENT
Start: 2025-06-04 | End: 2025-06-08

## 2025-06-04 RX ORDER — HEPARIN SODIUM 1000 [USP'U]/ML
4000 INJECTION, SOLUTION INTRAVENOUS; SUBCUTANEOUS ONCE
Status: COMPLETED | OUTPATIENT
Start: 2025-06-04 | End: 2025-06-04

## 2025-06-04 RX ORDER — NIFEDIPINE 30 MG/1
30 TABLET, EXTENDED RELEASE ORAL
Status: DISCONTINUED | OUTPATIENT
Start: 2025-06-04 | End: 2025-06-05

## 2025-06-04 RX ORDER — CEFTRIAXONE 1 G/50ML
1000 INJECTION, SOLUTION INTRAVENOUS ONCE
Status: DISCONTINUED | OUTPATIENT
Start: 2025-06-04 | End: 2025-06-04

## 2025-06-04 RX ORDER — ASPIRIN 81 MG/1
81 TABLET, CHEWABLE ORAL DAILY
Status: DISCONTINUED | OUTPATIENT
Start: 2025-06-05 | End: 2025-06-13 | Stop reason: HOSPADM

## 2025-06-04 RX ORDER — IPRATROPIUM BROMIDE AND ALBUTEROL SULFATE 2.5; .5 MG/3ML; MG/3ML
3 SOLUTION RESPIRATORY (INHALATION)
Status: DISCONTINUED | OUTPATIENT
Start: 2025-06-05 | End: 2025-06-08

## 2025-06-04 RX ORDER — LIDOCAINE 50 MG/G
1 PATCH TOPICAL DAILY
Status: DISCONTINUED | OUTPATIENT
Start: 2025-06-05 | End: 2025-06-13 | Stop reason: HOSPADM

## 2025-06-04 RX ORDER — SENNOSIDES 8.6 MG
8.6 TABLET ORAL
Status: DISCONTINUED | OUTPATIENT
Start: 2025-06-05 | End: 2025-06-13 | Stop reason: HOSPADM

## 2025-06-04 RX ORDER — ALLOPURINOL 100 MG/1
100 TABLET ORAL 3 TIMES WEEKLY
Status: DISCONTINUED | OUTPATIENT
Start: 2025-06-05 | End: 2025-06-13 | Stop reason: HOSPADM

## 2025-06-04 RX ORDER — FUROSEMIDE 10 MG/ML
80 INJECTION INTRAMUSCULAR; INTRAVENOUS ONCE
Status: COMPLETED | OUTPATIENT
Start: 2025-06-04 | End: 2025-06-04

## 2025-06-04 RX ORDER — HEPARIN SODIUM 10000 [USP'U]/100ML
3-20 INJECTION, SOLUTION INTRAVENOUS
Status: DISCONTINUED | OUTPATIENT
Start: 2025-06-04 | End: 2025-06-06

## 2025-06-04 RX ORDER — SEVELAMER HYDROCHLORIDE 800 MG/1
1600 TABLET, FILM COATED ORAL
Status: DISCONTINUED | OUTPATIENT
Start: 2025-06-05 | End: 2025-06-13 | Stop reason: HOSPADM

## 2025-06-04 RX ORDER — ASPIRIN 81 MG/1
81 TABLET, CHEWABLE ORAL DAILY
Status: DISCONTINUED | OUTPATIENT
Start: 2025-06-05 | End: 2025-06-05

## 2025-06-04 RX ORDER — HYDROMORPHONE HCL IN WATER/PF 6 MG/30 ML
0.2 PATIENT CONTROLLED ANALGESIA SYRINGE INTRAVENOUS EVERY 4 HOURS PRN
Status: DISCONTINUED | OUTPATIENT
Start: 2025-06-04 | End: 2025-06-13 | Stop reason: HOSPADM

## 2025-06-04 RX ORDER — HALOPERIDOL 5 MG/ML
5 INJECTION INTRAMUSCULAR ONCE
Status: DISCONTINUED | OUTPATIENT
Start: 2025-06-04 | End: 2025-06-04

## 2025-06-04 RX ORDER — METOPROLOL SUCCINATE 25 MG/1
25 TABLET, EXTENDED RELEASE ORAL
Status: DISCONTINUED | OUTPATIENT
Start: 2025-06-04 | End: 2025-06-05

## 2025-06-04 RX ORDER — ASPIRIN 81 MG/1
324 TABLET, CHEWABLE ORAL ONCE
Status: COMPLETED | OUTPATIENT
Start: 2025-06-04 | End: 2025-06-04

## 2025-06-04 RX ORDER — ACETAMINOPHEN 325 MG/1
650 TABLET ORAL EVERY 6 HOURS PRN
Status: DISCONTINUED | OUTPATIENT
Start: 2025-06-04 | End: 2025-06-13 | Stop reason: HOSPADM

## 2025-06-04 RX ORDER — INSULIN LISPRO 100 [IU]/ML
1-5 INJECTION, SOLUTION INTRAVENOUS; SUBCUTANEOUS
Status: DISCONTINUED | OUTPATIENT
Start: 2025-06-04 | End: 2025-06-13 | Stop reason: HOSPADM

## 2025-06-04 RX ORDER — HEPARIN SODIUM 1000 [USP'U]/ML
4000 INJECTION, SOLUTION INTRAVENOUS; SUBCUTANEOUS EVERY 6 HOURS PRN
Status: DISCONTINUED | OUTPATIENT
Start: 2025-06-04 | End: 2025-06-06

## 2025-06-04 RX ORDER — FENTANYL CITRATE 50 UG/ML
50 INJECTION, SOLUTION INTRAMUSCULAR; INTRAVENOUS ONCE
Refills: 0 | Status: COMPLETED | OUTPATIENT
Start: 2025-06-04 | End: 2025-06-04

## 2025-06-04 RX ORDER — HEPARIN SODIUM 1000 [USP'U]/ML
2000 INJECTION, SOLUTION INTRAVENOUS; SUBCUTANEOUS EVERY 6 HOURS PRN
Status: DISCONTINUED | OUTPATIENT
Start: 2025-06-04 | End: 2025-06-06

## 2025-06-04 RX ORDER — IPRATROPIUM BROMIDE AND ALBUTEROL SULFATE 2.5; .5 MG/3ML; MG/3ML
3 SOLUTION RESPIRATORY (INHALATION)
Status: DISCONTINUED | OUTPATIENT
Start: 2025-06-04 | End: 2025-06-04

## 2025-06-04 RX ORDER — HYDROMORPHONE HCL IN WATER/PF 6 MG/30 ML
0.2 PATIENT CONTROLLED ANALGESIA SYRINGE INTRAVENOUS ONCE
Status: COMPLETED | OUTPATIENT
Start: 2025-06-04 | End: 2025-06-04

## 2025-06-04 RX ORDER — ATORVASTATIN CALCIUM 80 MG/1
80 TABLET, FILM COATED ORAL
Status: DISCONTINUED | OUTPATIENT
Start: 2025-06-04 | End: 2025-06-13 | Stop reason: HOSPADM

## 2025-06-04 RX ORDER — GABAPENTIN 100 MG/1
100 CAPSULE ORAL 3 TIMES WEEKLY
Status: DISCONTINUED | OUTPATIENT
Start: 2025-06-04 | End: 2025-06-13 | Stop reason: HOSPADM

## 2025-06-04 RX ORDER — HYDROMORPHONE HCL IN WATER/PF 6 MG/30 ML
0.2 PATIENT CONTROLLED ANALGESIA SYRINGE INTRAVENOUS EVERY 4 HOURS PRN
Status: DISCONTINUED | OUTPATIENT
Start: 2025-06-04 | End: 2025-06-04

## 2025-06-04 RX ADMIN — NIFEDIPINE 30 MG: 30 TABLET, EXTENDED RELEASE ORAL at 20:42

## 2025-06-04 RX ADMIN — HEPARIN SODIUM 4000 UNITS: 1000 INJECTION, SOLUTION INTRAVENOUS; SUBCUTANEOUS at 06:15

## 2025-06-04 RX ADMIN — FUROSEMIDE 80 MG: 10 INJECTION, SOLUTION INTRAMUSCULAR; INTRAVENOUS at 04:18

## 2025-06-04 RX ADMIN — REMDESIVIR 200 MG: 100 INJECTION, POWDER, LYOPHILIZED, FOR SOLUTION INTRAVENOUS at 12:26

## 2025-06-04 RX ADMIN — FENTANYL CITRATE 50 MCG: 50 INJECTION INTRAMUSCULAR; INTRAVENOUS at 05:40

## 2025-06-04 RX ADMIN — HEPARIN SODIUM 11.1 UNITS/KG/HR: 10000 INJECTION, SOLUTION INTRAVENOUS at 06:21

## 2025-06-04 RX ADMIN — HEPARIN SODIUM 2000 UNITS: 1000 INJECTION, SOLUTION INTRAVENOUS; SUBCUTANEOUS at 21:12

## 2025-06-04 RX ADMIN — DEXAMETHASONE SODIUM PHOSPHATE 6 MG: 10 INJECTION, SOLUTION INTRAMUSCULAR; INTRAVENOUS at 12:10

## 2025-06-04 RX ADMIN — HYDROMORPHONE HYDROCHLORIDE 0.2 MG: 0.2 INJECTION, SOLUTION INTRAMUSCULAR; INTRAVENOUS; SUBCUTANEOUS at 12:32

## 2025-06-04 RX ADMIN — IPRATROPIUM BROMIDE AND ALBUTEROL SULFATE 3 ML: .5; 3 SOLUTION RESPIRATORY (INHALATION) at 19:56

## 2025-06-04 RX ADMIN — ATORVASTATIN CALCIUM 80 MG: 80 TABLET, FILM COATED ORAL at 20:43

## 2025-06-04 RX ADMIN — ASPIRIN 324 MG: 81 TABLET, CHEWABLE ORAL at 05:39

## 2025-06-04 RX ADMIN — METOPROLOL SUCCINATE 25 MG: 25 TABLET, EXTENDED RELEASE ORAL at 21:09

## 2025-06-04 RX ADMIN — HEPARIN SODIUM 2000 UNITS: 1000 INJECTION, SOLUTION INTRAVENOUS; SUBCUTANEOUS at 13:48

## 2025-06-04 RX ADMIN — Medication 2.5 MG: at 21:09

## 2025-06-04 NOTE — PROCEDURES
RENAL HD NOTE   Naresh Carpio 64 y.o. male MRN: [id number]  Unit/Bed#: [address] [place] [address] Encounter: [id number]    The patient was seen and examined on hemodialysis.  Time: 3.5 hours  Sodium: 138 Blood flow: 400   Dialyzer: F160 Potassium: 2 Dialysate flow: 1.5X   Access: R IJ permcath Bicarbonate: 35 Ultrafiltration goal: 3 kg   Medications on HD: none.

## 2025-06-04 NOTE — PROGRESS NOTES
Patient was seen and examined at bedside.  No acute changes since admission this morning.  Patient presenting with shortness of breath secondary to volume overload from noncompliance with dialysis.  Patient also found to be COVID-19 positive which may also be contributing.  Plan for hemodialysis treatment this morning.  Patient placed on remdesivir and dexamethasone per COVID protocol.  Patient also with elevated troponin along with ST depressions in the inferior leads concerning for NSTEMI.  Discussed with cardiology, tentative plan for cardiac catheterization on Friday pending respiratory improvement and nephrology clearance.  Continue heparin drip.

## 2025-06-04 NOTE — ASSESSMENT & PLAN NOTE
Lab Results   Component Value Date    EGFR 7 06/04/2025    EGFR 10 05/13/2025    EGFR 10 05/12/2025    CREATININE 7.00 (H) 06/04/2025    CREATININE 5.49 (H) 05/13/2025    CREATININE 5.15 (H) 05/12/2025     On HD TTS  Only completed 1 hour of dialysis on Tuesday per report given to ER  consult nephrology.  Potassium mildly elevated at 5.4.  Continue Renagel fluid restriction

## 2025-06-04 NOTE — ASSESSMENT & PLAN NOTE
[facility] TTS  Access: R IJ permcath  EDW 94.7 kg.   [place] [facility] [place] did < 1 hour.  He is mildly hyperkalemic today.  Will plan for dialysis today given hyperkalemia and fluid overload.  Next regular HD will be tomorrow.

## 2025-06-04 NOTE — ASSESSMENT & PLAN NOTE
Lab Results   Component Value Date    EGFR 7 06/04/2025    EGFR 10 05/13/2025    EGFR 10 05/12/2025    CREATININE 7.00 (H) 06/04/2025    CREATININE 5.49 (H) 05/13/2025    CREATININE 5.15 (H) 05/12/2025   followed by nephrology   on a Tuesday, Thursday, Saturday schedule  receiving extra dose of hemodialysis today

## 2025-06-04 NOTE — PROGRESS NOTES
Patient:  VICTORINA HARLEY    MRN:  [id number]    Erisin Request ID:  [id number]    Level of care reserved:  Skilled Nursing Facility    Partner Reserved:  [place] [address] [phone]    Clinical needs requested:    Geography searched:  10 miles around [id number]    Start of Service:    Request sent:  11:23am EDT on 6/4/2025 by Alisson Fuentes    Partner reserved:  11:46am EDT on 6/4/2025 by Alisson Fuentes    Choice list shared:  11:45am EDT on 6/4/2025 by Alisson Fuentes

## 2025-06-04 NOTE — ASSESSMENT & PLAN NOTE
first diagnosed it in 2023  patient states he is not had any further episodes of atrial fibrillation  patient previously was on Coreg but self stopped,  will start Toprol XL 25 mg due to abnormal troponins

## 2025-06-04 NOTE — ASSESSMENT & PLAN NOTE
BP is controlled.   Home Rx: Nifedipine 30 mg daily, furosemide 20 mg twice a day.  Current Rx: None.   Restart Nifedipine 30 mg tonight.   Monitor BP with UF on HD.

## 2025-06-04 NOTE — ASSESSMENT & PLAN NOTE
Felt to be due to fluid overload + infectious etiology.   COVID positive on admission.   Plan for 3 kg UF on HD today.   Being treated with dexamethasone and remdesivir.

## 2025-06-04 NOTE — ASSESSMENT & PLAN NOTE
Continue Procardia XL.  Per recent discharge summary, patient was refusing Eliquis due to his recent history of retroperitoneal hematoma in April 2025

## 2025-06-04 NOTE — ASSESSMENT & PLAN NOTE
As noted by tachycardia, tachypnea due to volume overload.  Lactic acid within normal limits.  No obvious signs of infection, hold off on antibiotics  Blood cultures drawn in the ER

## 2025-06-04 NOTE — ASSESSMENT & PLAN NOTE
Lab Results   Component Value Date    HGBA1C 5.7 (H) 02/09/2025       Recent Labs     06/04/25  0809 06/04/25  1053   POCGLU 118 86       Blood Sugar Average: Last 72 hrs:  (P) 102  managed for primary team

## 2025-06-04 NOTE — ASSESSMENT & PLAN NOTE
high-sensitivity troponins:  1304 (0hr), 5884 (2hr), > 22,973(4hr)  12 lead EKG with ST depression seen in inferior lateral leads of 2 to 3 mmHg  concerning for NSTEMI  will add Toprol-XL 25 mg daily  continue aspirin 81 mg once a day  continue IV heparin ACS protocol  discussed with nephrology and patient, plan on tentative left heart catheterization with possible PCI on Friday 6/6/2025.

## 2025-06-04 NOTE — ASSESSMENT & PLAN NOTE
Wt Readings from Last 3 Encounters:   06/04/25 90.8 kg (200 lb 2.8 oz)   05/13/25 92.6 kg (204 lb 3.2 oz)   05/05/25 96.6 kg (213 lb)   BNP 2283  volume overload secondary to noncompliance with hemodialysis missing treatment and cutting treatment short.  Receiving extra hemodialysis treatment today and will resume Tuesday/Thursday/Saturday schedule  will add Toprol-XL 25 mg daily

## 2025-06-04 NOTE — PLAN OF CARE
Target UF Goal 3 L as tolerated. Patient dialyzing for 3.5 hours on 2 K bath for serum K of 5.4 per protocol. Treatment plan reviewed with Dr. Mack.     Post-Dialysis RN Treatment Note    Blood Pressure:  Pre 126/66 mm/Hg  Post 134/57 mmHg   EDW:  tbd kg    Weight:  Pre 95.3  kg   Post 93.3 kg   Mode of weight measurement: Bed Scale   Volume Removed:  3000 ml    Treatment duration: 210 minutes    NS given:  No    Treatment shortened No   Medications given during Rx: None Reported   Estimated Kt/V:  None Reported   Access type: Permacath/TDC   Needle Gauge: na   Access Issues: No    Report called to primary nurse:   Yes Marcelino Schwartz RN    Problem: METABOLIC, FLUID AND ELECTROLYTES - ADULT  Goal: Electrolytes maintained within normal limits  Description: INTERVENTIONS:  - Monitor labs and assess patient for signs and symptoms of electrolyte imbalances  - Administer electrolyte replacement as ordered  - Monitor response to electrolyte replacements, including repeat lab results as appropriate  - Instruct patient on fluid and nutrition as appropriate  Outcome: Progressing  Goal: Fluid balance maintained  Description: INTERVENTIONS:  - Monitor labs   - Monitor I/O and WT  - Instruct patient on fluid and nutrition as appropriate  - Assess for signs & symptoms of volume excess or deficit  Outcome: Progressing

## 2025-06-04 NOTE — H&P
H&P - Hospitalist   Name: Naresh Carpio 64 y.o. male I MRN: [id number]  Unit/Bed#: ED CT1 I Date of Admission: 6/4/2025   Date of Service: 6/4/2025 I Hospital Day: 0     Assessment & Plan  Acute respiratory failure with hypoxia (HCC)  Patient presents from rehab facility due to shortness of breath.  He was noted to be hypoxic to 84% on room air and in respiratory distress by medics  Initially required BiPAP in the ER and currently on 4 L  VBG showed pH of 7.3 with PCO2 of 52 and PO2 of 30  Likely in the setting of volume overload due to noncompliance with dialysis sessions  Chest x-ray appears to be pulmonary edema however official read is pending  Further management with dialysis.  Titrate off oxygen as tolerated  COVID pending  Elevated troponin  Initial troponin 1304.  EKG shows ST depressions.  Patient denies any chest pain  Trend troponins.  Cardiology consult  Has been started on heparin drip in the ER  Continue aspirin, statin  Check lipid panel.  Monitor on telemetry  ESRD (end stage renal disease) ([facility])  Lab Results   Component Value Date    EGFR 7 06/04/2025    EGFR 10 05/13/2025    EGFR 10 05/12/2025    CREATININE 7.00 (H) 06/04/2025    CREATININE 5.49 (H) 05/13/2025    CREATININE 5.15 (H) 05/12/2025     On HD TTS  Only completed 1 hour of dialysis on Tuesday per report given to ER  consult nephrology.  Potassium mildly elevated at 5.4.  Continue Renagel fluid restriction  Bilateral leg edema  Bilateral leg edema - L > R, no longer on anticoagulation  Will get venous duplex for further evaluation  Essential hypertension  Continue Procardia XL and monitor blood pressures  SIRS (systemic inflammatory response syndrome) (HCC)  As noted by tachycardia, tachypnea due to volume overload.  Lactic acid within normal limits.  No obvious signs of infection, hold off on antibiotics  Blood cultures drawn in the ER  Diabetes mellitus type 2 with peripheral artery disease ([facility])  Lab Results   Component Value Date     "HGBA1C 5.7 (H) 02/09/2025       No results for input(s): \"POCGLU\" in the last 72 hours.    Place patient on Accu-Cheks with SSI.  Update A1c    Paroxysmal atrial fibrillation (HCC)  Continue Procardia XL.  Per recent discharge summary, patient was refusing Eliquis due to his recent history of retroperitoneal hematoma in April 2025  Chronic diastolic (congestive) heart failure (HCC)  Wt Readings from Last 3 Encounters:   05/13/25 92.6 kg (204 lb 3.2 oz)   05/05/25 96.6 kg (213 lb)   04/11/25 98.5 kg (217 lb 2.5 oz)     Patient clinically appears volume overloaded from not completing dialysis session  Received 80 mg of IV Lasix in the ER  Anemia in ESRD (end-stage renal disease)  (HCC)  Hemoglobin 9.6 which is at his baseline.  Repeat in a.m.  L2 vertebral fracture (HCC)  Per prior note, patient to utilize LSO brace  PT/OT while here      VTE Pharmacologic Prophylaxis:   Heparin  Code Status: Full code  Discussion with family: Patient declined call to .     Anticipated Length of Stay: Patient will be admitted on an observation basis with an anticipated length of stay of less than 2 midnights secondary to acute respiratory failure with hypoxia, elevated troponin.    History of Present Illness   Chief Complaint: Shortness of breath, hypoxia    Naresh Carpio is a 64 y.o. male with a PMH of ESRD, diastolic heart failure, paroxysmal A-fib who presents from rehab due to shortness of breath.  Per paperwork from rehab patient received oxycodone and then received 50 mcg of fentanyl in the ER.  Patient unable to provide any meaningful information but is able to answer some questions with yes or no.  Patient states he is not sure why he is here.  Per ER patient only got 1 hour of dialysis treatment yesterday, refused to keep getting dialyzed and was sent back to rehab.  Patient was noted to be hypoxic to 84% on room air today and noted to be in respiratory distress per medics requiring BiPAP support initially in " the ER.  Patient states his breathing is now better.  Denies any chest pain    Review of Systems   Constitutional:  Negative for chills and fever.   HENT:  Negative for congestion.    Respiratory:  Positive for shortness of breath. Negative for chest tightness.    Cardiovascular:  Positive for leg swelling. Negative for chest pain.   Gastrointestinal:  Negative for abdominal pain, diarrhea, nausea and vomiting.   Genitourinary:  Negative for dysuria, frequency and hematuria.   Musculoskeletal:  Positive for back pain.   Neurological:  Negative for headaches.   Hematological:  Does not bruise/bleed easily.   Psychiatric/Behavioral:  Negative for agitation.        Historical Information   Past Medical History[1]  Past Surgical History[2]  Social History[3]  E-Cigarette/Vaping    E-Cigarette Use Never User      E-Cigarette/Vaping Substances    Nicotine No     THC No     CBD No     Flavoring No     Other No     Unknown No      Family History[4]  Social History:  Marital Status:    Occupation: none  Patient Pre-hospital Living Situation: Currently at [facility]  Patient Pre-hospital Diet Restrictions: none    Meds/Allergies   I have reviewed home medications using recent Epic encounter.  Prior to Admission medications    Medication Sig Start Date End Date Taking? Authorizing Provider   acetaminophen (TYLENOL) 325 mg tablet Take 2 tablets (650 mg total) by mouth every 6 (six) hours as needed for mild pain 5/6/25  Yes GRANT Dawson   allopurinol (ZYLOPRIM) 100 mg tablet Take 1 tablet (100 mg total) by mouth daily 5/6/25 1/7/26 Yes GRANT Dawson   aspirin 81 mg chewable tablet Chew 1 tablet (81 mg total) daily 5/16/25  Yes Joey Gurrola DO   atorvastatin (LIPITOR) 80 mg tablet Take 1 tablet (80 mg total) by mouth daily 5/6/25  Yes GRANT Dawson   furosemide (LASIX) 20 mg tablet Take 20 mg by mouth 2 (two) times a day   Yes Historical Provider, MD   gabapentin (NEURONTIN) 100  mg capsule Take 1 capsule (100 mg total) by mouth 3 (three) times a week 4/1/25  Yes Nallely Pisano PA-C   ipratropium-albuterol (DUO-NEB) 0.5-2.5 mg/3 mL nebulizer solution Take 3 mL by nebulization 6 (six) times a day   Yes Historical Provider, MD   melatonin 3 mg Take 2 tablets (6 mg total) by mouth daily at bedtime 5/7/25  Yes Santos Guan DO   NIFEdipine (PROCARDIA XL) 30 mg 24 hr tablet Take 1 tablet (30 mg total) by mouth daily after dinner 5/6/25  Yes GRANT Dawson   senna (SENOKOT) 8.6 mg Take 1 tablet (8.6 mg total) by mouth daily with lunch 5/7/25  Yes Santos Guan DO   sevelamer (RENAGEL) 800 mg tablet Take 2 tablets (1,600 mg total) by mouth 3 (three) times a day with meals 5/6/25  Yes GRANT Dawson   apixaban (Eliquis) 5 mg Take 1 tablet (5 mg total) by mouth 2 (two) times a day  Patient not taking: Reported on 5/16/2025 5/6/25   GRANT Dawson   lidocaine (LIDODERM) 5 % Apply 1 patch topically over 12 hours daily Remove & Discard patch within 12 hours or as directed by MD 5/13/25   Nirav Bruce MD   methocarbamol (ROBAXIN) 500 mg tablet Take 1 tablet (500 mg total) by mouth 3 (three) times a day as needed for muscle spasms 5/7/25   Santos Guan DO   polyethylene glycol (MIRALAX) 17 g packet Take 17 g by mouth daily as needed (constipation)  Patient not taking: Reported on 6/4/2025 5/7/25   Santos Guan DO     No Known Allergies    Objective :  Temp:  [99.9 °F (37.7 °C)] 99.9 °F (37.7 °C)  HR:  [80-98] 80  BP: (121-154)/(58-74) 121/58  Resp:  [26-28] 26  SpO2:  [94 %-100 %] 94 %  O2 Device: Nasal cannula  Nasal Cannula O2 Flow Rate (L/min):  [4 L/min] 4 L/min  FiO2 (%):  [40] 40    Physical Exam  Vitals reviewed.   Constitutional:       General: He is not in acute distress.     Appearance: He is ill-appearing (chronically).   HENT:      Head: Normocephalic and atraumatic.     Eyes:      General: No scleral icterus.      Cardiovascular:      Rate and  Rhythm: Normal rate and regular rhythm.   Pulmonary:      Effort: No respiratory distress.      Breath sounds: Rales present. No wheezing.      Comments: Decreased breath sounds bilaterally  Abdominal:      General: There is no distension.      Palpations: Abdomen is soft.      Tenderness: There is no abdominal tenderness.     Musculoskeletal:      Comments: Bilateral lower extremity edema - L > R.  Chronic venous stasis changes noted     Neurological:      Mental Status: He is alert.      Comments: Oriented to self, initially did not know where he was but later able to state that he is at [facility].  Not oriented to time       Lines/Drains:            Lab Results: I have reviewed the following results:  Results from last 7 days   Lab Units 06/04/25  0359   WBC Thousand/uL 8.53   HEMOGLOBIN g/dL 9.6*   HEMATOCRIT % 31.8*   PLATELETS Thousands/uL 218   SEGS PCT % 85*   LYMPHO PCT % 5*   MONO PCT % 9   EOS PCT % 0     Results from last 7 days   Lab Units 06/04/25  0359   SODIUM mmol/L 130*   POTASSIUM mmol/L 5.4*   CHLORIDE mmol/L 91*   CO2 mmol/L 27   BUN mg/dL 40*   CREATININE mg/dL 7.00*   ANION GAP mmol/L 12   CALCIUM mg/dL 9.0   GLUCOSE RANDOM mg/dL 139             Lab Results   Component Value Date    HGBA1C 5.7 (H) 02/09/2025    HGBA1C 5.5 10/08/2024    HGBA1C 6.2 (H) 07/02/2024     Results from last 7 days   Lab Units 06/04/25  0359   LACTIC ACID mmol/L 1.8       Imaging Results Review: I personally reviewed the following image studies/reports in PACS and discussed pertinent findings with Radiology: chest xray. My interpretation of the radiology images/reports is: Pulmonary edema.  Other Study Results Review: No additional pertinent studies reviewed.    Administrative Statements       ** Please Note: This note has been constructed using a voice recognition system. **         [1]   Past Medical History:  Diagnosis Date    Acute cystitis 10/18/2024    Acute on chronic anemia 01/23/2022    Atrial fibrillation  (HCC)     Bilateral sacral fracture, closed (HCC) 07/03/2024    Bradycardia 09/05/2023    Diabetes mellitus (HCC)     ESRD (end stage renal disease) on dialysis (HCC)     Hematuria 10/10/2024    Hematuria 10/10/2024    Hyperkalemia 04/06/2024    Hyperkalemia 04/06/2024    Hyperlipidemia     Hypertension     Left toe amputee (HCC)     Subacute osteomyelitis of right foot (HCC)     TIA (transient ischemic attack)     Toxic metabolic encephalopathy 10/08/2024    Traumatic rhabdomyolysis (HCC) 08/19/2023   [2]   Past Surgical History:  Procedure Laterality Date    AMPUTATION Left     left foot resection 10 years ago dr tran    HEMODIALYSIS ADULT  1/18/2025    HEMODIALYSIS ADULT  2/27/2025    IR EMBOLIZATION (SPECIFY VESSEL OR SITE)  4/3/2025    IR EMBOLIZATION (SPECIFY VESSEL OR SITE)  4/7/2025    IR LOWER EXTREMITY ANGIOGRAM  08/29/2023    IR TEMPORARY DIALYSIS CATHETER PLACEMENT  08/25/2023    IR TUNNELED CENTRAL LINE REMOVAL  08/23/2023    IR TUNNELED DIALYSIS CATHETER PLACEMENT  01/27/2022    IR TUNNELED DIALYSIS CATHETER PLACEMENT  08/30/2023    ME AMPUTATION METATARSAL W/TOE SINGLE Right 8/31/2023    Procedure: RIGHT PARTIAL 1ST RAY RESECTION WITH  WOUND VAC APPLICATION;  Surgeon: Won Parmar DPM;  Location: [facility];  Service: Podiatry   [3]   Social History  Tobacco Use    Smoking status: Never     Passive exposure: Never    Smokeless tobacco: Never   Vaping Use    Vaping status: Never Used   Substance and Sexual Activity    Alcohol use: Not Currently     Alcohol/week: 0.0 standard drinks of alcohol     Comment: 0    Drug use: Never   [4] No family history on file.

## 2025-06-04 NOTE — ASSESSMENT & PLAN NOTE
He is not on calcitriol in the outpatient setting.  Home Rx: Sevelamer.   Place on sevelamer for hyperphosphatemia.  Check phosphorus level.

## 2025-06-04 NOTE — ED NOTES
Pt changed into gown and new heart monitor leads applied. Spine brace applied as per order. Pt repositioned with pillows sitting high fowlers.     Christy Wren RN  06/04/25 1048

## 2025-06-04 NOTE — ASSESSMENT & PLAN NOTE
Patient presents from rehab facility due to shortness of breath.  He was noted to be hypoxic to 84% on room air and in respiratory distress by medics  Initially required BiPAP in the ER and currently on 4 L  VBG showed pH of 7.3 with PCO2 of 52 and PO2 of 30  Likely in the setting of volume overload due to noncompliance with dialysis sessions  Chest x-ray appears to be pulmonary edema however official read is pending  Further management with dialysis.  Titrate off oxygen as tolerated  COVID pending

## 2025-06-04 NOTE — ASSESSMENT & PLAN NOTE
in the setting of noncompliance with hemodialysis  admission chest x-ray with pulmonary edema  patient tested positive for Covid  started on Remdesivir

## 2025-06-04 NOTE — ASSESSMENT & PLAN NOTE
Wt Readings from Last 3 Encounters:   05/13/25 92.6 kg (204 lb 3.2 oz)   05/05/25 96.6 kg (213 lb)   04/11/25 98.5 kg (217 lb 2.5 oz)     Patient clinically appears volume overloaded from not completing dialysis session  Received 80 mg of IV Lasix in the ER

## 2025-06-04 NOTE — CONSULTS
Consultation - Cardiology   Name: Naresh Carpio 64 y.o. male I MRN: [id number]  Unit/Bed#: [address] [place] [address] I Date of Admission: 6/4/2025   Date of Service: 6/4/2025 I Hospital Day: 0   Inpatient consult to Cardiology  Consult performed by: GRANT Shell  Consult ordered by: Milan Ruiz MD        Physician Requesting Evaluation: Milan Ruiz MD   Reason for Evaluation / Principal Problem: abnormal troponins concerning for NSTEMI    Assessment & Plan  Acute respiratory failure with hypoxia (HCC)  in the setting of noncompliance with hemodialysis  admission chest x-ray with pulmonary edema  patient tested positive for Covid  started on Remdesivir  Elevated troponin  high-sensitivity troponins:  1304 (0hr), 5884 (2hr), > 22,973(4hr)  12 lead EKG with ST depression seen in inferior lateral leads of 2 to 3 mmHg  concerning for NSTEMI  will add Toprol-XL 25 mg daily  continue aspirin 81 mg once a day  continue IV heparin ACS protocol  discussed with nephrology and patient, plan on tentative left heart catheterization with possible PCI on Friday 6/6/2025.  Volume overload secondary to noncompliance with hemodialysis  Wt Readings from Last 3 Encounters:   06/04/25 90.8 kg (200 lb 2.8 oz)   05/13/25 92.6 kg (204 lb 3.2 oz)   05/05/25 96.6 kg (213 lb)   BNP 2283  volume overload secondary to noncompliance with hemodialysis missing treatment and cutting treatment short.  Receiving extra hemodialysis treatment today and will resume Tuesday/Thursday/Saturday schedule  will add Toprol-XL 25 mg daily  ESRD (end stage renal disease) (HCC)  Lab Results   Component Value Date    EGFR 7 06/04/2025    EGFR 10 05/13/2025    EGFR 10 05/12/2025    CREATININE 7.00 (H) 06/04/2025    CREATININE 5.49 (H) 05/13/2025    CREATININE 5.15 (H) 05/12/2025   followed by nephrology   on a Tuesday, Thursday, Saturday schedule  receiving extra dose of hemodialysis today  Essential hypertension  blood pressures currently  "stable  continue to monitor  will start Toprol-XL 25 mg daily  Diabetes mellitus type 2 with peripheral artery disease (HCC)  Lab Results   Component Value Date    HGBA1C 5.7 (H) 02/09/2025       Recent Labs     06/04/25  0809 06/04/25  1053   POCGLU 118 86       Blood Sugar Average: Last 72 hrs:  (P) 102  managed for primary team  Paroxysmal atrial fibrillation (HCC)  first diagnosed it in 2023  patient states he is not had any further episodes of atrial fibrillation  patient previously was on Coreg but self stopped,  will start Toprol XL 25 mg due to abnormal troponins  Bilateral leg edema  appears to be chronic and venous in origin  venous duplex pending      History of Present Illness   Naresh Carpio is a 64 y.o. male who presented to the emergency room overnight with complaints of shortness of breath. He was brought in by EMS in respiratory distress. Of note patient is frequently admitted to the hospital due to noncompliance with his hemodialysis.. Patient caught his hemodialysis treatment short yesterday because he states he felt like he was \"patient is coming down with the flu\". BNP was 2283 and high-sensitivity troponins were 1304, 5884 and greater than 22,973. 12 lead EKG noted ST depression in the inferior lateral leads of 2 to 3 mm. Of note reviewing EKGs patient has similar EKG changes noted in April 5, 2025 with resolution on later EKGs performed in April..    Patient has history of end-stage renal disease on hemodialysis for which she misses treatments or cuts them short fairly frequently. Diabetes, hypertension, dyslipidemia, TIA, osteoarthritis, peripheral arterial disease, neuropathy and history of paroxysmal atrial fibrillation first diagnosed in 2023. Patient states he has not experienced any further about fibrillation. He also notes he has never had stress test or cardiac catheterization.    Review of Systems   Constitutional:  Positive for activity change and fatigue.   HENT: Negative.  " Negative for congestion, ear discharge, rhinorrhea and tinnitus.    Eyes: Negative.  Negative for photophobia and visual disturbance.   Respiratory:  Positive for shortness of breath. Negative for chest tightness.    Cardiovascular:  Positive for leg swelling. Negative for chest pain and palpitations.   Gastrointestinal: Negative.  Negative for abdominal distention, constipation, nausea and vomiting.   Endocrine: Negative.  Negative for polydipsia, polyphagia and polyuria.   Genitourinary: Negative.  Negative for difficulty urinating.   Musculoskeletal:  Positive for gait problem.   Skin: Negative.    Neurological:  Negative for dizziness, syncope, weakness and light-headedness.   Hematological: Negative.    Psychiatric/Behavioral: Negative.       Historical Information   Past Medical History[1]  Past Surgical History[2]  Social History[3]  E-Cigarette/Vaping    E-Cigarette Use Never User      E-Cigarette/Vaping Substances    Nicotine No     THC No     CBD No     Flavoring No     Other No     Unknown No      Family History[4]  Social History[5]    Current Facility-Administered Medications:     dexamethasone (PF) (DECADRON) injection 6 mg, Q24H    haloperidol lactate (HALDOL) injection 5 mg, Once    heparin (porcine) 25,000 units in 0.45% NaCl 250 mL infusion (premix), Titrated, Last Rate: 11.1 Units/kg/hr (06/04/25 0621)    heparin (porcine) injection 2,000 Units, Q6H PRN    heparin (porcine) injection 4,000 Units, Q6H PRN    insulin lispro (HumALOG/ADMELOG) 100 units/mL subcutaneous injection 1-5 Units, TID AC **AND** Fingerstick Glucose (POCT), TID AC    remdesivir (Veklury) 200 mg in sodium chloride 0.9 % 290 mL IVPB, Q24H **FOLLOWED BY** [START ON 6/5/2025] remdesivir (Veklury) 100 mg in sodium chloride 0.9 % 270 mL IVPB, Q24H  Prior to Admission Medications   Prescriptions Last Dose Informant Patient Reported? Taking?   NIFEdipine (PROCARDIA XL) 30 mg 24 hr tablet 6/3/2025 Outside Facility (Specify) No Yes    Sig: Take 1 tablet (30 mg total) by mouth daily after dinner   acetaminophen (TYLENOL) 325 mg tablet Past Week Outside Facility (Specify) No Yes   Sig: Take 2 tablets (650 mg total) by mouth every 6 (six) hours as needed for mild pain   allopurinol (ZYLOPRIM) 100 mg tablet 6/3/2025 Outside Facility (Specify) No Yes   Sig: Take 1 tablet (100 mg total) by mouth daily   apixaban (Eliquis) 5 mg  Outside Facility (Specify) No No   Sig: Take 1 tablet (5 mg total) by mouth 2 (two) times a day   Patient not taking: Reported on 5/16/2025   aspirin 81 mg chewable tablet 6/3/2025  No Yes   Sig: Chew 1 tablet (81 mg total) daily   atorvastatin (LIPITOR) 80 mg tablet 6/3/2025 Outside Facility (Specify) No Yes   Sig: Take 1 tablet (80 mg total) by mouth daily   furosemide (LASIX) 20 mg tablet   Yes Yes   Sig: Take 20 mg by mouth 2 (two) times a day   gabapentin (NEURONTIN) 100 mg capsule 6/3/2025 Outside Facility (Specify) No Yes   Sig: Take 1 capsule (100 mg total) by mouth 3 (three) times a week   ipratropium-albuterol (DUO-NEB) 0.5-2.5 mg/3 mL nebulizer solution 6/3/2025  Yes Yes   Sig: Take 3 mL by nebulization 6 (six) times a day   lidocaine (LIDODERM) 5 %  Outside Facility (Specify) No No   Sig: Apply 1 patch topically over 12 hours daily Remove & Discard patch within 12 hours or as directed by MD   melatonin 3 mg 6/3/2025 Outside Facility (Specify) No Yes   Sig: Take 2 tablets (6 mg total) by mouth daily at bedtime   methocarbamol (ROBAXIN) 500 mg tablet  Outside Facility (Specify) No No   Sig: Take 1 tablet (500 mg total) by mouth 3 (three) times a day as needed for muscle spasms   polyethylene glycol (MIRALAX) 17 g packet Not Taking Outside Facility (Specify) No No   Sig: Take 17 g by mouth daily as needed (constipation)   Patient not taking: Reported on 6/4/2025   senna (SENOKOT) 8.6 mg 6/3/2025 Outside Facility (Specify) No Yes   Sig: Take 1 tablet (8.6 mg total) by mouth daily with lunch   sevelamer (RENAGEL) 800 mg  tablet 6/3/2025 Outside Facility (Specify) No Yes   Sig: Take 2 tablets (1,600 mg total) by mouth 3 (three) times a day with meals      Facility-Administered Medications: None     Patient has no known allergies.    Objective :  Temp:  [97.1 °F (36.2 °C)-99.9 °F (37.7 °C)] 98.1 °F (36.7 °C)  HR:  [62-98] 70  BP: (114-154)/(55-78) 129/66  Resp:  [16-30] 18  SpO2:  [85 %-100 %] 100 %  O2 Device: Nasal cannula  Nasal Cannula O2 Flow Rate (L/min):  [2 L/min-4 L/min] 2 L/min  FiO2 (%):  [40] 40  Orthostatic Blood Pressures      Flowsheet Row Most Recent Value   Blood Pressure 129/66 filed at 06/04/2025 1100   Patient Position - Orthostatic VS Lying filed at 06/04/2025 0800          First Weight: Weight - Scale: 90.8 kg (200 lb 2.8 oz) (06/04/25 0609)  Vitals:    06/04/25 0609   Weight: 90.8 kg (200 lb 2.8 oz)       Physical Exam  Vitals and nursing note reviewed.   Constitutional:       Appearance: Normal appearance. He is normal weight. He is ill-appearing (Chronically).   HENT:      Right Ear: External ear normal.      Left Ear: External ear normal.     Eyes:      General:         Right eye: No discharge.         Left eye: No discharge.       Cardiovascular:      Rate and Rhythm: Normal rate and regular rhythm.      Pulses: Normal pulses.      Heart sounds: Normal heart sounds.   Pulmonary:      Effort: Pulmonary effort is normal. No accessory muscle usage or respiratory distress.      Breath sounds: Examination of the right-lower field reveals decreased breath sounds and rales. Examination of the left-lower field reveals decreased breath sounds and rales. Decreased breath sounds and rales present. No wheezing.   Abdominal:      General: There is no distension.      Palpations: Abdomen is soft.     Musculoskeletal:      Right lower leg: Edema present.      Left lower leg: Edema present.     Skin:     Capillary Refill: Capillary refill takes less than 2 seconds.      Comments: Venous stasis discoloration and changes to  skin of both lower extremities     Neurological:      Mental Status: He is alert and oriented to person, place, and time. Mental status is at baseline.     Psychiatric:         Mood and Affect: Mood normal.           Lab Results: I have reviewed the following results:  Results from last 7 days   Lab Units 06/04/25  0359   WBC Thousand/uL 8.53   HEMOGLOBIN g/dL 9.6*   HEMATOCRIT % 31.8*   PLATELETS Thousands/uL 218     Results from last 7 days   Lab Units 06/04/25  0359   POTASSIUM mmol/L 5.4*   CHLORIDE mmol/L 91*   CO2 mmol/L 27   BUN mg/dL 40*   CREATININE mg/dL 7.00*   CALCIUM mg/dL 9.0     Results from last 7 days   Lab Units 06/04/25  0538   INR  1.12   PTT seconds 27     Lab Results   Component Value Date    HGBA1C 5.7 (H) 02/09/2025     Lab Results   Component Value Date    CKTOTAL 43 02/08/2025     12 lead EKG demonstrates sinus rhythm with inferior lateral ST depression of 1 to 2 mm, first noted in EKG of April 5, 2025 improved in EKG later in April and now is re-demonstrated.    VTE Prophylaxis: VTE covered by:  heparin (porcine), Intravenous, 11.1 Units/kg/hr at 06/04/25 0621  heparin (porcine), Intravenous  heparin (porcine), Intravenous     Lynne BURNS  Cardiology       [1]   Past Medical History:  Diagnosis Date    Acute cystitis 10/18/2024    Acute on chronic anemia 01/23/2022    Atrial fibrillation (HCC)     Bilateral sacral fracture, closed ([facility]) 07/03/2024    Bradycardia 09/05/2023    Diabetes mellitus (HCC)     ESRD (end stage renal disease) on dialysis ([facility])     Hematuria 10/10/2024    Hematuria 10/10/2024    Hyperkalemia 04/06/2024    Hyperkalemia 04/06/2024    Hyperlipidemia     Hypertension     Left toe amputee ([facility])     Subacute osteomyelitis of right foot ([facility])     TIA (transient ischemic attack)     Toxic metabolic encephalopathy 10/08/2024    Traumatic rhabdomyolysis ([facility]) 08/19/2023   [2]   Past Surgical History:  Procedure Laterality Date    AMPUTATION Left     left foot resection  10 years ago dr tran    HEMODIALYSIS ADULT  1/18/2025    HEMODIALYSIS ADULT  2/27/2025    HEMODIALYSIS ADULT  6/4/2025    IR EMBOLIZATION (SPECIFY VESSEL OR SITE)  4/3/2025    IR EMBOLIZATION (SPECIFY VESSEL OR SITE)  4/7/2025    IR LOWER EXTREMITY ANGIOGRAM  08/29/2023    IR TEMPORARY DIALYSIS CATHETER PLACEMENT  08/25/2023    IR TUNNELED CENTRAL LINE REMOVAL  08/23/2023    IR TUNNELED DIALYSIS CATHETER PLACEMENT  01/27/2022    IR TUNNELED DIALYSIS CATHETER PLACEMENT  08/30/2023    UT AMPUTATION METATARSAL W/TOE SINGLE Right 8/31/2023    Procedure: RIGHT PARTIAL 1ST RAY RESECTION WITH  WOUND VAC APPLICATION;  Surgeon: Won Parmar DPM;  Location: [facility];  Service: Podiatry   [3]   Social History  Tobacco Use    Smoking status: Never     Passive exposure: Never    Smokeless tobacco: Never   Vaping Use    Vaping status: Never Used   Substance and Sexual Activity    Alcohol use: Not Currently     Alcohol/week: 0.0 standard drinks of alcohol     Comment: 0    Drug use: Never   [4] No family history on file.  [5]   Social History  Tobacco Use    Smoking status: Never     Passive exposure: Never    Smokeless tobacco: Never   Vaping Use    Vaping status: Never Used   Substance and Sexual Activity    Alcohol use: Not Currently     Alcohol/week: 0.0 standard drinks of alcohol     Comment: 0    Drug use: Never

## 2025-06-04 NOTE — ASSESSMENT & PLAN NOTE
Initial troponin 1304.  EKG shows ST depressions.  Patient denies any chest pain  Trend troponins.  Cardiology consult  Has been started on heparin drip in the ER  Continue aspirin, statin  Check lipid panel.  Monitor on telemetry

## 2025-06-04 NOTE — ASSESSMENT & PLAN NOTE
"Lab Results   Component Value Date    HGBA1C 5.7 (H) 02/09/2025       No results for input(s): \"POCGLU\" in the last 72 hours.    Place patient on Accu-Cheks with SSI.  Update A1c    "

## 2025-06-04 NOTE — CONSULTS
NEPHROLOGY HOSPITAL CONSULTATION   Naresh Carpio 64 y.o. male MRN: [id number]  Unit/Bed#: [address] [place] [address] Encounter: [id number]    Assessment & Plan  ESRD (end stage renal disease) ([facility])  [facility] TTS  Access: PATRICIA chacon  EDW 94.7 kg.   He did not complete HD yesterday at [facility] - only did < 1 hour.  He is mildly hyperkalemic today.  Will plan for dialysis today given hyperkalemia and fluid overload.  Next regular HD will be tomorrow.    Acute respiratory failure with hypoxia ([facility])  Felt to be due to fluid overload + infectious etiology.   COVID positive on admission.   Plan for 3 kg UF on HD today.   Being treated with dexamethasone and remdesivir.      Essential hypertension  BP is controlled.   Home Rx: Nifedipine 30 mg daily, furosemide 20 mg twice a day.  Current Rx: None.   Restart Nifedipine 30 mg tonight.   Monitor BP with UF on HD.    Chronic diastolic (congestive) heart failure ([facility])  10/9/24 Echo EF 60-65%, G1DD  Monitor symptoms with UF on HD.     Anemia in ESRD (end-stage renal disease)  ([facility])  Hgb is close to goal - 9.6.   He is on Mircera 150 mcg every 2 weeks and Venofer.     Chronic kidney disease-mineral and bone disorder  He is not on calcitriol in the outpatient setting.  Home Rx: Sevelamer.   Place on sevelamer for hyperphosphatemia.  Check phosphorus level.    Bilateral leg edema  This is chronic.       SUMMARY OF RECOMMENDATIONS:  HD today x 3.5 hours.   3 kg UF on HD.   Low K diet.   Monitor respiratory symptoms with UF on HD.  Restart nifedipine and sevelamer.    I called [facility] personally to obtain outpatient HD records.   The images (CXR) were personally reviewed by me in PACS    HISTORY OF PRESENT ILLNESS:  Requesting Physician: Milan Ruiz MD  Reason for Consult: ESRD on HD    Naresh Carpio is a 64 y.o. male who was admitted to  on 6/4/25 after presenting with not feeling well. A renal consultation is requested today for assistance in the management of  ESRD. Naresh Carpio is a known ESRD patient who undergoes maintenance hemodialysis at [facility] on a TTS schedule.     Naresh has a history of HTN, DM, HLP, ESRD on HD, CAD, TIA, atrial fib, RP hematoma.  He now presents to [facility] on June 4, 2025 after not feeling well for about a week. He reported flu like symptoms and feeling weak and nauseous. He denied any chest pain, vomiting, abdominal pain.  He did report shortness of breath and cough.  He has chronic lower extremity swelling.  He was at dialysis yesterday but only completed < 1 hour of HD.  From what I am told, he was close to his dry weight before starting dialysis yesterday.  Due to worsening symptoms, he came to [facility] and was diagnosed with respiratory failure.  COVID testing was found to be positive and he was subsequently admitted.  We are now being consulted for assistance with management of ESRD on HD.    PAST MEDICAL HISTORY:  Past Medical History[1]    PAST SURGICAL HISTORY:  Past Surgical History[2]    ALLERGIES:  Allergies[3]    SOCIAL HISTORY:  Social History     Substance and Sexual Activity   Alcohol Use Not Currently    Alcohol/week: 0.0 standard drinks of alcohol    Comment: 0     Social History     Substance and Sexual Activity   Drug Use Never     Tobacco Use History[4]    FAMILY HISTORY:  Family History[5]    MEDICATIONS:  Current Medications[6]    REVIEW OF SYSTEMS:  All the systems were reviewed and were negative except as documented on the HPI.    PHYSICAL EXAM:  Current Weight: Weight - Scale: 90.8 kg (200 lb 2.8 oz)  First Weight: Weight - Scale: 90.8 kg (200 lb 2.8 oz)  Vitals:    06/04/25 0845 06/04/25 0920 06/04/25 0945 06/04/25 1000   BP:  117/78 126/66 126/67   BP Location:       Pulse: 75 74 75 74   Resp:  18 18 18   Temp:  98.1 °F (36.7 °C)     TempSrc:  Axillary     SpO2: (!) 85%   99%   Weight:           Intake/Output Summary (Last 24 hours) at 6/4/2025 1035  Last data filed at 6/4/2025 0945  Gross  per 24 hour   Intake 200 ml   Output --   Net 200 ml     Physical Exam  General: conscious, coherent, cooperative, not in distress.   Skin: warm, dry, good turgor.   Eyes: pink conjunctivae, no scleral icterus.   ENT: moist lips and mucous membranes.   Respiratory: equal chest expansion, decreased breath sounds, some crackles bilaterally.   Cardiovascular: distinct heart sounds, normal rate, regular rhythm, no rub  Abdomen: soft, non-tender, non-distended, normoactive bowel sounds  Extremities: bilateral LE edema, venous stasis changes.   Genitourinary: no gaitan catheter.   Neuro: awake, alert, oriented to time, place and person.   Psych: appropriate affect.      Invasive Devices: R IJ permcath.      Lab Results:   Results from last 7 days   Lab Units 06/04/25  0359   WBC Thousand/uL 8.53   HEMOGLOBIN g/dL 9.6*   HEMATOCRIT % 31.8*   PLATELETS Thousands/uL 218   POTASSIUM mmol/L 5.4*   CHLORIDE mmol/L 91*   CO2 mmol/L 27   BUN mg/dL 40*   CREATININE mg/dL 7.00*   CALCIUM mg/dL 9.0     Lab Results   Component Value Date    CALCIUM 9.0 06/04/2025    PHOS 3.4 05/12/2025     Other Studies:   CXR personally reviewed by me in PACS showed vascular congestion.       [1]   Past Medical History:  Diagnosis Date    Acute cystitis 10/18/2024    Acute on chronic anemia 01/23/2022    Atrial fibrillation ([facility])     Bilateral sacral fracture, closed ([facility]) 07/03/2024    Bradycardia 09/05/2023    Diabetes mellitus (HCC)     ESRD (end stage renal disease) on dialysis ([facility])     Hematuria 10/10/2024    Hematuria 10/10/2024    Hyperkalemia 04/06/2024    Hyperkalemia 04/06/2024    Hyperlipidemia     Hypertension     Left toe amputee (HCC)     Subacute osteomyelitis of right foot (HCC)     TIA (transient ischemic attack)     Toxic metabolic encephalopathy 10/08/2024    Traumatic rhabdomyolysis ([facility]) 08/19/2023   [2]   Past Surgical History:  Procedure Laterality Date    AMPUTATION Left     left foot resection 10 years ago dr tran     HEMODIALYSIS ADULT  1/18/2025    HEMODIALYSIS ADULT  2/27/2025    HEMODIALYSIS ADULT  6/4/2025    IR EMBOLIZATION (SPECIFY VESSEL OR SITE)  4/3/2025    IR EMBOLIZATION (SPECIFY VESSEL OR SITE)  4/7/2025    IR LOWER EXTREMITY ANGIOGRAM  08/29/2023    IR TEMPORARY DIALYSIS CATHETER PLACEMENT  08/25/2023    IR TUNNELED CENTRAL LINE REMOVAL  08/23/2023    IR TUNNELED DIALYSIS CATHETER PLACEMENT  01/27/2022    IR TUNNELED DIALYSIS CATHETER PLACEMENT  08/30/2023    OR AMPUTATION METATARSAL W/TOE SINGLE Right 8/31/2023    Procedure: RIGHT PARTIAL 1ST RAY RESECTION WITH  WOUND VAC APPLICATION;  Surgeon: Won Parmar DPM;  Location: [facility];  Service: Podiatry   [3] No Known Allergies  [4]   Social History  Tobacco Use   Smoking Status Never    Passive exposure: Never   Smokeless Tobacco Never   [5] No family history on file.  [6]   Current Facility-Administered Medications:     dexamethasone (PF) (DECADRON) injection 6 mg, 6 mg, Intravenous, Q24H, Milan Ruiz MD    haloperidol lactate (HALDOL) injection 5 mg, 5 mg, Intramuscular, Once, Dakota Spann MD    heparin (porcine) 25,000 units in 0.45% NaCl 250 mL infusion (premix), 3-20 Units/kg/hr (Order-Specific), Intravenous, Titrated, Dakota Spann MD, Last Rate: 10 mL/hr at 06/04/25 0621, 11.1 Units/kg/hr at 06/04/25 0621    heparin (porcine) injection 2,000 Units, 2,000 Units, Intravenous, Q6H PRN, Dakota Spann MD    heparin (porcine) injection 4,000 Units, 4,000 Units, Intravenous, Q6H PRN, Dakota Spann MD    insulin lispro (HumALOG/ADMELOG) 100 units/mL subcutaneous injection 1-5 Units, 1-5 Units, Subcutaneous, TID AC **AND** Fingerstick Glucose (POCT), , , TID AC, Lore Silver DO    remdesivir (Veklury) 200 mg in sodium chloride 0.9 % 290 mL IVPB, 200 mg, Intravenous, Q24H **FOLLOWED BY** [START ON 6/5/2025] remdesivir (Veklury) 100 mg in sodium chloride 0.9 % 270 mL IVPB, 100 mg, Intravenous, Q24H, Milan Ruiz MD

## 2025-06-04 NOTE — ED PROVIDER NOTES
Final Diagnosis:  1. Leg edema    2. Pulmonary edema    3. Dependence on renal dialysis (HCC)    4. Subendocardial ischemia (HCC)    5. Hypoxic respiratory failure (HCC)    6. Chronic hypercapnic respiratory failure (HCC)    7. Elevated troponin    8. ESRD (end stage renal disease) ([facility])        Chief Complaint   Patient presents with    Respiratory Distress     Arrives via medics with resp distress after cutting dialysis short today. 84% on RA per medics       HPI  Pt pres w/ sob  Dialysis dependent, does still make urine  Went to dialysis, left after 1 hr 3 left to go    At [facility] had sob so presents here on cpap from ems  Rales    No fever  Tachypnea    Was on eloquis until had retroperitoneal bleed after fall    Here rales, distress, b'l asymmetric edema.     Denies any chest pain  Has back pain known fractures  Also has sacral pressure injury.     Able to deescalate to NC but changed to simple mask for comfort.     Not on o2 chronically        EMS additionally reports:     - Previous charting underwent limited review with attention to last ED visits, labs, ekgs, and prior imaging.  Chart review reveals :     Admission on 05/08/2025, Discharged on 05/13/2025   Component Date Value Ref Range Status    WBC 05/08/2025 8.16  4.31 - 10.16 Thousand/uL Final    RBC 05/08/2025 3.11 (L)  3.88 - 5.62 Million/uL Final    Hemoglobin 05/08/2025 9.4 (L)  12.0 - 17.0 g/dL Final    Hematocrit 05/08/2025 30.2 (L)  36.5 - 49.3 % Final    MCV 05/08/2025 97  82 - 98 fL Final    MCH 05/08/2025 30.2  26.8 - 34.3 pg Final    MCHC 05/08/2025 31.1 (L)  31.4 - 37.4 g/dL Final    RDW 05/08/2025 18.5 (H)  11.6 - 15.1 % Final    MPV 05/08/2025 8.7 (L)  8.9 - 12.7 fL Final    Platelets 05/08/2025 231  149 - 390 Thousands/uL Final    nRBC 05/08/2025 0  /100 WBCs Final    Segmented % 05/08/2025 74  43 - 75 % Final    Immature Grans % 05/08/2025 0  0 - 2 % Final    Lymphocytes % 05/08/2025 13 (L)  14 - 44 % Final    Monocytes % 05/08/2025 9  4 - 12  % Final    Eosinophils Relative 05/08/2025 3  0 - 6 % Final    Basophils Relative 05/08/2025 1  0 - 1 % Final    Absolute Neutrophils 05/08/2025 6.05  1.85 - 7.62 Thousands/µL Final    Absolute Immature Grans 05/08/2025 0.03  0.00 - 0.20 Thousand/uL Final    Absolute Lymphocytes 05/08/2025 1.02  0.60 - 4.47 Thousands/µL Final    Absolute Monocytes 05/08/2025 0.76  0.17 - 1.22 Thousand/µL Final    Eosinophils Absolute 05/08/2025 0.26  0.00 - 0.61 Thousand/µL Final    Basophils Absolute 05/08/2025 0.04  0.00 - 0.10 Thousands/µL Final    Sodium 05/08/2025 128 (L)  135 - 147 mmol/L Final    Potassium 05/08/2025 5.2  3.5 - 5.3 mmol/L Final    Slightly Hemolyzed:Results may be affected.    Chloride 05/08/2025 96  96 - 108 mmol/L Final    CO2 05/08/2025 20 (L)  21 - 32 mmol/L Final    ANION GAP 05/08/2025 12  4 - 13 mmol/L Final    BUN 05/08/2025 29 (H)  5 - 25 mg/dL Final    Creatinine 05/08/2025 5.83 (H)  0.60 - 1.30 mg/dL Final    Standardized to IDMS reference method    Glucose 05/08/2025 90  65 - 140 mg/dL Final    If the patient is fasting, the ADA then defines impaired fasting glucose as > 100 mg/dL and diabetes as > or equal to 123 mg/dL.    Calcium 05/08/2025 8.7  8.4 - 10.2 mg/dL Final    eGFR 05/08/2025 9  ml/min/1.73sq m Final    Magnesium 05/08/2025 2.3  1.9 - 2.7 mg/dL Final    Phosphorus 05/08/2025 3.6  2.3 - 4.1 mg/dL Final    Slightly Hemolyzed:Results may be affected.    Sodium 05/08/2025 131 (L)  135 - 147 mmol/L Final    Potassium 05/08/2025 3.7  3.5 - 5.3 mmol/L Final    Chloride 05/08/2025 98  96 - 108 mmol/L Final    CO2 05/08/2025 23  21 - 32 mmol/L Final    ANION GAP 05/08/2025 10  4 - 13 mmol/L Final    BUN 05/08/2025 25  5 - 25 mg/dL Final    Creatinine 05/08/2025 4.90 (H)  0.60 - 1.30 mg/dL Final    Standardized to IDMS reference method    Glucose 05/08/2025 92  65 - 140 mg/dL Final    If the patient is fasting, the ADA then defines impaired fasting glucose as > 100 mg/dL and diabetes as > or  equal to 123 mg/dL.    Glucose, Fasting 05/08/2025 92  65 - 99 mg/dL Final    Calcium 05/08/2025 8.0 (L)  8.4 - 10.2 mg/dL Final    eGFR 05/08/2025 11  ml/min/1.73sq m Final    MRSA Culture Only 05/08/2025 Methicillin Resistant Staphylococcus aureus isolated (A)   Final    MRSA Culture Only 05/08/2025 This patient requires contact isolation precautions per [place] law. Contact precautions are not required in [place] for nasal surveillance cultures.   Final    WBC 05/09/2025 5.42  4.31 - 10.16 Thousand/uL Final    RBC 05/09/2025 2.85 (L)  3.88 - 5.62 Million/uL Final    Hemoglobin 05/09/2025 8.6 (L)  12.0 - 17.0 g/dL Final    Hematocrit 05/09/2025 27.3 (L)  36.5 - 49.3 % Final    MCV 05/09/2025 96  82 - 98 fL Final    MCH 05/09/2025 30.2  26.8 - 34.3 pg Final    MCHC 05/09/2025 31.5  31.4 - 37.4 g/dL Final    RDW 05/09/2025 18.6 (H)  11.6 - 15.1 % Final    Platelets 05/09/2025 202  149 - 390 Thousands/uL Final    MPV 05/09/2025 8.8 (L)  8.9 - 12.7 fL Final    Sodium 05/09/2025 130 (L)  135 - 147 mmol/L Final    Potassium 05/09/2025 3.7  3.5 - 5.3 mmol/L Final    Chloride 05/09/2025 96  96 - 108 mmol/L Final    CO2 05/09/2025 22  21 - 32 mmol/L Final    ANION GAP 05/09/2025 12  4 - 13 mmol/L Final    BUN 05/09/2025 19  5 - 25 mg/dL Final    Creatinine 05/09/2025 4.34 (H)  0.60 - 1.30 mg/dL Final    Standardized to IDMS reference method    Glucose 05/09/2025 89  65 - 140 mg/dL Final    If the patient is fasting, the ADA then defines impaired fasting glucose as > 100 mg/dL and diabetes as > or equal to 123 mg/dL.    Glucose, Fasting 05/09/2025 89  65 - 99 mg/dL Final    Calcium 05/09/2025 8.3 (L)  8.4 - 10.2 mg/dL Final    eGFR 05/09/2025 13  ml/min/1.73sq m Final    WBC 05/10/2025 5.37  4.31 - 10.16 Thousand/uL Final    RBC 05/10/2025 2.75 (L)  3.88 - 5.62 Million/uL Final    Hemoglobin 05/10/2025 8.5 (L)  12.0 - 17.0 g/dL Final    Hematocrit 05/10/2025 27.2 (L)  36.5 - 49.3 % Final    MCV 05/10/2025 99 (H)  82  - 98 fL Final    MCH 05/10/2025 30.9  26.8 - 34.3 pg Final    MCHC 05/10/2025 31.3 (L)  31.4 - 37.4 g/dL Final    RDW 05/10/2025 18.6 (H)  11.6 - 15.1 % Final    Platelets 05/10/2025 210  149 - 390 Thousands/uL Final    MPV 05/10/2025 8.9  8.9 - 12.7 fL Final    Sodium 05/10/2025 131 (L)  135 - 147 mmol/L Final    Potassium 05/10/2025 4.1  3.5 - 5.3 mmol/L Final    Chloride 05/10/2025 97  96 - 108 mmol/L Final    CO2 05/10/2025 21  21 - 32 mmol/L Final    ANION GAP 05/10/2025 13  4 - 13 mmol/L Final    BUN 05/10/2025 31 (H)  5 - 25 mg/dL Final    Creatinine 05/10/2025 5.63 (H)  0.60 - 1.30 mg/dL Final    Standardized to IDMS reference method    Glucose 05/10/2025 99  65 - 140 mg/dL Final    If the patient is fasting, the ADA then defines impaired fasting glucose as > 100 mg/dL and diabetes as > or equal to 123 mg/dL.    Calcium 05/10/2025 8.4  8.4 - 10.2 mg/dL Final    eGFR 05/10/2025 9  ml/min/1.73sq m Final    Sodium 05/11/2025 129 (L)  135 - 147 mmol/L Final    Potassium 05/11/2025 4.1  3.5 - 5.3 mmol/L Final    Chloride 05/11/2025 94 (L)  96 - 108 mmol/L Final    CO2 05/11/2025 23  21 - 32 mmol/L Final    ANION GAP 05/11/2025 12  4 - 13 mmol/L Final    BUN 05/11/2025 21  5 - 25 mg/dL Final    Creatinine 05/11/2025 3.90 (H)  0.60 - 1.30 mg/dL Final    Standardized to IDMS reference method    Glucose 05/11/2025 87  65 - 140 mg/dL Final    If the patient is fasting, the ADA then defines impaired fasting glucose as > 100 mg/dL and diabetes as > or equal to 123 mg/dL.    Calcium 05/11/2025 8.6  8.4 - 10.2 mg/dL Final    eGFR 05/11/2025 15  ml/min/1.73sq m Final    Hemoglobin 05/11/2025 8.5 (L)  12.0 - 17.0 g/dL Final    Hematocrit 05/11/2025 27.5 (L)  36.5 - 49.3 % Final    Sodium 05/12/2025 130 (L)  135 - 147 mmol/L Final    Potassium 05/12/2025 4.1  3.5 - 5.3 mmol/L Final    Chloride 05/12/2025 95 (L)  96 - 108 mmol/L Final    CO2 05/12/2025 23  21 - 32 mmol/L Final    ANION GAP 05/12/2025 12  4 - 13 mmol/L Final     BUN 05/12/2025 33 (H)  5 - 25 mg/dL Final    Creatinine 05/12/2025 5.15 (H)  0.60 - 1.30 mg/dL Final    Standardized to IDMS reference method    Glucose 05/12/2025 104  65 - 140 mg/dL Final    If the patient is fasting, the ADA then defines impaired fasting glucose as > 100 mg/dL and diabetes as > or equal to 123 mg/dL.    Calcium 05/12/2025 8.8  8.4 - 10.2 mg/dL Final    eGFR 05/12/2025 10  ml/min/1.73sq m Final    Hemoglobin 05/12/2025 8.6 (L)  12.0 - 17.0 g/dL Final    Hematocrit 05/12/2025 28.2 (L)  36.5 - 49.3 % Final    Magnesium 05/12/2025 2.1  1.9 - 2.7 mg/dL Final    Phosphorus 05/12/2025 3.4  2.3 - 4.1 mg/dL Final    POC Glucose 05/12/2025 148 (H)  65 - 140 mg/dl Final    Sodium 05/13/2025 129 (L)  135 - 147 mmol/L Final    Potassium 05/13/2025 4.2  3.5 - 5.3 mmol/L Final    Chloride 05/13/2025 94 (L)  96 - 108 mmol/L Final    CO2 05/13/2025 24  21 - 32 mmol/L Final    ANION GAP 05/13/2025 11  4 - 13 mmol/L Final    BUN 05/13/2025 40 (H)  5 - 25 mg/dL Final    Creatinine 05/13/2025 5.49 (H)  0.60 - 1.30 mg/dL Final    Standardized to IDMS reference method    Glucose 05/13/2025 129  65 - 140 mg/dL Final    If the patient is fasting, the ADA then defines impaired fasting glucose as > 100 mg/dL and diabetes as > or equal to 123 mg/dL.    Calcium 05/13/2025 8.5  8.4 - 10.2 mg/dL Final    eGFR 05/13/2025 10  ml/min/1.73sq m Final    Hemoglobin 05/13/2025 8.4 (L)  12.0 - 17.0 g/dL Final    Hematocrit 05/13/2025 27.0 (L)  36.5 - 49.3 % Final       - No language barrier.   - History obtained from patient    - Discuss patient's care, with patient permission or by chart review, with      PMH:   has a past medical history of Acute cystitis (10/18/2024), Acute on chronic anemia (01/23/2022), Atrial fibrillation ([facility]), Bilateral sacral fracture, closed ([facility]) (07/03/2024), Bradycardia (09/05/2023), Diabetes mellitus ([facility]), ESRD (end stage renal disease) on dialysis ([facility]), Hematuria (10/10/2024), Hematuria  (10/10/2024), Hyperkalemia (04/06/2024), Hyperkalemia (04/06/2024), Hyperlipidemia, Hypertension, Left toe amputee (HCC), Subacute osteomyelitis of right foot (HCC), TIA (transient ischemic attack), Toxic metabolic encephalopathy (10/08/2024), and Traumatic rhabdomyolysis (HCC) (08/19/2023).    PSH:   has a past surgical history that includes IR tunneled dialysis catheter placement (01/27/2022); IR tunneled central line removal (08/23/2023); IR temporary dialysis catheter placement (08/25/2023); IR lower extremity angiogram (08/29/2023); IR tunneled dialysis catheter placement (08/30/2023); Amputation (Left); pr amputation metatarsal w/toe single (Right, 8/31/2023); Hemodialysis Adult (1/18/2025); Hemodialysis Adult (2/27/2025); IR embolization (specify vessel or site) (4/3/2025); and IR embolization (specify vessel or site) (4/7/2025).     Social History:  Tobacco Use: Low Risk  (6/4/2025)    Patient History     Smoking Tobacco Use: Never     Smokeless Tobacco Use: Never     Passive Exposure: Never     Alcohol Use: Not At Risk (8/19/2023)    AUDIT-C     Frequency of Alcohol Consumption: Never     Average Number of Drinks: Patient does not drink     Frequency of Binge Drinking: Never     No illicit use       ROS:  Pertinent positives/negatives: .     Some ROS may be present in the HPI and would take precedent over these standard questions asked below.   Review of Systems   Unable to perform ROS: Other      Both somewhat encephalopathic and also uncooperative.         PE:     Physical exam highlights:   Physical Exam       Vitals:    06/04/25 0545 06/04/25 0609 06/04/25 0730 06/04/25 0745   BP: 121/58  114/61    BP Location: Left arm      Pulse: 80  80 83   Resp: (!) 26  (!) 30 (!) 24   Temp:   97.7 °F (36.5 °C)    TempSrc:   Tympanic    SpO2: 94%  94% 94%   Weight:  90.8 kg (200 lb 2.8 oz)       Vitals reviewed by me.   Nursing note reviewed  Chaperone present for all sensitive exam.  Const: mild  acute distress.  Alert. Nontoxic. Not diaphoretic.    HEENT: External ears normal. No protrusion drainage swelling. Nose normal. No drainage/traumatic deformity. MM dry. Mouth with baseline/symmetric movement. No trismus.   Eyes: No squinting. No icterus. No tearing/swelling/drainage. Tracks through the room with normal EOM.   Neck: ROM normal. No rigidity. No meningismus.  Cards: Rate as per vitals. Mild tachbycardia. Compared to monitor sinus unless documented. Regular Well perfused.  Pulm: inc wob. Mild. Diffuse rales. Speaking full sentences. No wheezing.   Abd: No distension beyond baseline. No fluctuant wave. Patient without peritoneal pain with shifting/bumping the bed.   MSK: ROM normal baseline. No deformity. No contractures from baseline.   Skin:  chronic venous stasis dermatitis on legs. Asymmetric edema in legs.   Neuro: Nonfocal. Baseline. CN grossly intact. Moving all four with coordination.   Psych: confused, uncooperative.         A:  - Nursing note reviewed.    Ddx and MDM  Considered diagnoses    Likely this is pulm edema, incomplete dialysis  80 lasix  Will need admission  Bipap deescalated  Vbg w/ some chronic retention  Can trend    Acs?  EKG w/ lateral depressions  Trop  Elev  Asa  Ptt  Heparin  Trend  Cards    Meeting sirs  Pneumonia lower on ddx  Hold abx  Blood cultures  Lactic    Check viral illness  Covid  Flu  Covid pos  Admit for hypoxia, hypoxic resp failure w/ chornic hypercapneia      Was on eloquis  Asymmetric leg swelling  PE on ddx but still makes urine, skips dialsysi a lot so renal fxn important  Might be worth VAS and nuclear med > CTA but can consider if any clinical decline  On heparin at this time. ,        Critical Care Time Statement: Upon my evaluation, this patient had a high probability of imminent or life-threatening deterioration due to resp failure, acs, which required my direct attention, intervention, and personal management.  I spent a total of 80 minutes directly providing  critical care services, including interpretation of complex medical databases, evaluating for the presence of life-threatening injuries or illnesses, management of organ system failure(s) , complex medical decision making (to support/prevent further life-threatening deterioration)., and titration of continuous IV medications (drips). This time is exclusive of procedures, teaching, treating other patients, family meetings, and any prior time recorded by providers other than myself.          Dispo decision admit, hypoxic resp failure.       My conversation with consultant reveals:    IM  Discussion about CTA PE or optimize and vas + NM       Decision rules:                    ED Course as of 06/04/25 0802 Wed Jun 04, 2025   0637 Procedure Note: EKG  Date/Time: 06/04/25 6:37 AM   Interpreted by: Dakota Spann  Indications / Diagnosis: sob  ECG reviewed by me, the ED Provider: yes   The EKG demonstrates:  Rhythm: sinus    Intervals: normal intervals  Axis: normal axis  QRS/Blocks: normal QRS  ST Changes: no NAZARIO. Lateral STD   T wave changes: nonspec             My read of the XR/CT scan reveals:     XR chest 1 view portable   ED Interpretation   Pulm edema          Orders Placed This Encounter   Procedures    Blood culture #1    Blood culture #2    FLU/COVID Rapid Antigen (30 min. TAT) - Preferred screening test in ED    XR chest 1 view portable    CBC and differential    Basic metabolic panel    HS Troponin 0hr (reflex protocol)    Blood gas, venous    Lactic acid, plasma (w/reflex if result > 2.0)    HS Troponin I 2hr    HS Troponin I 4hr    APTT    Protime-INR    UA (URINE) with reflex to Scope    B-Type Natriuretic Peptide(BNP)    Heparin: ACS (low) or Straight Infusion Protocol Administration Instructions    Heparin: ACS (Low) or Straight  Infusion Protocol Notify Physician If:    Heparin Infusion and Platelet Monitoring Per Protocol    Insulin Subcutaneous Notify Physician    Insulin Subcutaneous Instruction     Hypoglycemia Protocol    Fingerstick Glucose (POCT) Before meals and at bedtime    Fingerstick Glucose (POCT)    Spine Brace    Bipap    ECG 12 lead    ECG 12 lead    Place in Observation     Labs Reviewed   COVID-19/INFLUENZA A/B RAPID ANTIGEN (30 MIN.TAT) - Abnormal       Result Value Ref Range Status    SARS COV Rapid Antigen Positive (*) Negative Final    Influenza A Rapid Antigen Negative  Negative Final    Influenza B Rapid Antigen Negative  Negative Final    Narrative:     This test has been performed using the [employer] Gracie 2 FLU+SARS Antigen test under the Emergency Use Authorization (EUA). This test has been validated by the  and verified by the performing laboratory. The Gracie uses lateral flow immunofluorescent sandwich assay to detect SARS-COV, Influenza A and Influenza B Antigen.     The [employer] Gracie 2 SARS Antigen test does not differentiate between SARS-CoV and SARS-CoV-2.     Negative results are presumptive and may be confirmed with a molecular assay, if necessary, for patient management. Negative results do not rule out SARS-CoV-2 or influenza infection and should not be used as the sole basis for treatment or patient management decisions. A negative test result may occur if the level of antigen in a sample is below the limit of detection of this test.     Positive results are indicative of the presence of viral antigens, but do not rule out bacterial infection or co-infection with other viruses.     All test results should be used as an adjunct to clinical observations and other information available to the provider.    FOR PEDIATRIC PATIENTS - copy/paste COVID Guidelines URL to browser: [email or web address]   CBC AND DIFFERENTIAL - Abnormal    WBC 8.53  4.31 - 10.16 Thousand/uL Final    RBC 3.21 (*) 3.88 - 5.62 Million/uL Final    Hemoglobin 9.6 (*) 12.0 - 17.0 g/dL Final    Hematocrit 31.8 (*) 36.5 - 49.3 % Final    MCV 99 (*)  82 - 98 fL Final    MCH 29.9  26.8 - 34.3 pg Final    MCHC 30.2 (*) 31.4 - 37.4 g/dL Final    RDW 14.9  11.6 - 15.1 % Final    MPV 8.9  8.9 - 12.7 fL Final    Platelets 218  149 - 390 Thousands/uL Final    nRBC 0  /100 WBCs Final    Segmented % 85 (*) 43 - 75 % Final    Immature Grans % 1  0 - 2 % Final    Lymphocytes % 5 (*) 14 - 44 % Final    Monocytes % 9  4 - 12 % Final    Eosinophils Relative 0  0 - 6 % Final    Basophils Relative 0  0 - 1 % Final    Absolute Neutrophils 7.28  1.85 - 7.62 Thousands/µL Final    Absolute Immature Grans 0.06  0.00 - 0.20 Thousand/uL Final    Absolute Lymphocytes 0.42 (*) 0.60 - 4.47 Thousands/µL Final    Absolute Monocytes 0.75  0.17 - 1.22 Thousand/µL Final    Eosinophils Absolute 0.00  0.00 - 0.61 Thousand/µL Final    Basophils Absolute 0.02  0.00 - 0.10 Thousands/µL Final   BASIC METABOLIC PANEL - Abnormal    Sodium 130 (*) 135 - 147 mmol/L Final    Potassium 5.4 (*) 3.5 - 5.3 mmol/L Final    Chloride 91 (*) 96 - 108 mmol/L Final    CO2 27  21 - 32 mmol/L Final    ANION GAP 12  4 - 13 mmol/L Final    BUN 40 (*) 5 - 25 mg/dL Final    Creatinine 7.00 (*) 0.60 - 1.30 mg/dL Final    Comment: Standardized to IDMS reference method    Glucose 139  65 - 140 mg/dL Final    Comment: If the patient is fasting, the ADA then defines impaired fasting glucose as > 100 mg/dL and diabetes as > or equal to 123 mg/dL.    Calcium 9.0  8.4 - 10.2 mg/dL Final    eGFR 7  ml/min/1.73sq m Final    Narrative:     National Kidney Disease Foundation guidelines for Chronic Kidney Disease (CKD):     Stage 1 with normal or high GFR (GFR > 90 mL/min/1.73 square meters)    Stage 2 Mild CKD (GFR = 60-89 mL/min/1.73 square meters)    Stage 3A Moderate CKD (GFR = 45-59 mL/min/1.73 square meters)    Stage 3B Moderate CKD (GFR = 30-44 mL/min/1.73 square meters)    Stage 4 Severe CKD (GFR = 15-29 mL/min/1.73 square meters)    Stage 5 End Stage CKD (GFR <15 mL/min/1.73 square meters)  Note: GFR calculation is  "accurate only with a steady state creatinine   HS TROPONIN I 0HR - Abnormal    hs TnI 0hr 1,304 (*) \"Refer to ACS Flowchart\"- see link ng/L Final    Comment:                                              Initial (time 0) result  If >=50 ng/L, Myocardial injury suggested ;  Type of myocardial injury and treatment strategy  to be determined.  If 5-49 ng/L, a delta result at 2 hours will be needed to further evaluate.  If <4 ng/L, and chest pain has been >3 hours since onset, patient may qualify for discharge based on the HEART score in the ED.  If <5 ng/L and <3hours since onset of chest pain, a delta result at 2 hours will be needed to further evaluate.    HS Troponin 99th Percentile URL of a Health Population=12 ng/L with a 95% Confidence Interval of 8-18 ng/L.    Second Troponin (time 2 hours)  If calculated delta >= 20 ng/L,  Myocardial injury suggested ; Type of myocardial injury and treatment strategy to be determined.  If 5-49 ng/L and the calculated delta is 5-19 ng/L, consult medical service for evaluation.  Continue evaluation for ischemia on ecg and other possible etiology and repeat hs troponin at 4 hours.  If delta is <5 ng/L at 2 hours, consider discharge based on risk stratification via the HEART score (if in ED), or CARRIE risk score in IP/Observation.    HS Troponin 99th Percentile URL of a Health Population=12 ng/L with a 95% Confidence Interval of 8-18 ng/L.   BLOOD GAS, VENOUS - Abnormal    pH, Ruperto 7.330  7.300 - 7.400 Final    pCO2, Ruperto 52.2 (*) 42.0 - 50.0 mm Hg Final    pO2, Ruperto 30.0 (*) 35.0 - 45.0 mm Hg Final    HCO3, Ruperto 26.9  24 - 30 mmol/L Final    Base Excess, Ruperto 0.5  mmol/L Final    O2 Content, Ruperto 7.0  ml/dL Final    O2 HGB, VENOUS 48.0 (*) 60.0 - 80.0 % Final   HS TROPONIN I 2HR - Abnormal    hs TnI 2hr 5,884 (*) \"Refer to ACS Flowchart\"- see link ng/L Final    Comment:                                              Initial (time 0) result  If >=50 ng/L, Myocardial injury suggested ;  Type " of myocardial injury and treatment strategy  to be determined.  If 5-49 ng/L, a delta result at 2 hours will be needed to further evaluate.  If <4 ng/L, and chest pain has been >3 hours since onset, patient may qualify for discharge based on the HEART score in the ED.  If <5 ng/L and <3hours since onset of chest pain, a delta result at 2 hours will be needed to further evaluate.    HS Troponin 99th Percentile URL of a Health Population=12 ng/L with a 95% Confidence Interval of 8-18 ng/L.    Second Troponin (time 2 hours)  If calculated delta >= 20 ng/L,  Myocardial injury suggested ; Type of myocardial injury and treatment strategy to be determined.  If 5-49 ng/L and the calculated delta is 5-19 ng/L, consult medical service for evaluation.  Continue evaluation for ischemia on ecg and other possible etiology and repeat hs troponin at 4 hours.  If delta is <5 ng/L at 2 hours, consider discharge based on risk stratification via the HEART score (if in ED), or CARRIE risk score in IP/Observation.    HS Troponin 99th Percentile URL of a Health Population=12 ng/L with a 95% Confidence Interval of 8-18 ng/L.    Delta 2hr hsTnI 4,580 (*) <20 ng/L Final    Narrative:     Verified by repeat analysis   B-TYPE NATRIURETIC PEPTIDE (BNP) - Abnormal    BNP 2,283 (*) 0 - 100 pg/mL Final   LACTIC ACID, PLASMA (W/REFLEX IF RESULT > 2.0) - Normal    LACTIC ACID 1.8  0.5 - 2.0 mmol/L Final    Narrative:     Result may be elevated if tourniquet was used during collection.   APTT - Normal    PTT 27  23 - 34 seconds Final    Comment: Therapeutic Heparin Range =  60-90 seconds   PROTIME-INR - Normal    Protime 14.9  12.3 - 15.0 seconds Final    INR 1.12  0.85 - 1.19 Final    Narrative:     INR Therapeutic Range    Indication                                             INR Range      Atrial Fibrillation                                               2.0-3.0  Hypercoagulable State                                    2.0.2.3  Left Ventricular  Asist Device                            2.0-3.0  Mechanical Heart Valve                                  -    Aortic(with afib, MI, embolism, HF, LA enlargement,    and/or coagulopathy)                                     2.0-3.0 (2.5-3.5)     Mitral                                                             2.5-3.5  Prosthetic/Bioprosthetic Heart Valve               2.0-3.0  Venous thromboembolism (VTE: VT, PE        2.0-3.0   BLOOD CULTURE   BLOOD CULTURE   HS TROPONIN I 4HR   URINALYSIS WITH REFLEX TO SCOPE       *Each of these labs was reviewed. Particular standout labs will be noted in the ED Course above     Final Diagnosis:  1. Leg edema    2. Pulmonary edema    3. Dependence on renal dialysis (HCC)    4. 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 Hypoxic respiratory failure ([facility])    6. Chronic hypercapnic respiratory failure ([facility])    7. Elevated troponin    8. ESRD (end stage renal disease) ([facility])          P:  - hospital tx includes   Medications   haloperidol lactate (HALDOL) injection 5 mg (0 mg Intramuscular Hold 6/4/25 0642)   heparin (porcine) 25,000 units in 0.45% NaCl 250 mL infusion (premix) (11.1 Units/kg/hr × 90 kg (Order-Specific) Intravenous New Bag 6/4/25 0621)   heparin (porcine) injection 4,000 Units (has no administration in time range)   heparin (porcine) injection 2,000 Units (has no administration in time range)   insulin lispro (HumALOG/ADMELOG) 100 units/mL subcutaneous injection 1-5 Units (has no administration in time range)   furosemide (LASIX) injection 80 mg (80 mg Intravenous Given 6/4/25 3348)   aspirin chewable tablet 324 mg (324 mg Oral Given 6/4/25 0539)   fentaNYL injection 50 mcg (50 mcg Intravenous Given 6/4/25 0540)   heparin (porcine) injection 4,000 Units (4,000 Units Intravenous Given 6/4/25 0615)         - disposition  Time reflects when diagnosis was documented in both MDM as applicable and the Disposition within this note       Time User Action Codes Description Comment     6/4/2025  5:26 AM Dakota Spann Add [R60.0] Leg edema     6/4/2025  5:26 AM Dakota Spann Add [J81.1] Pulmonary edema     6/4/2025  5:26 AM Dakota Spann Add [Z99.2] Dependence on renal dialysis (HCC)     6/4/2025  5:26 AM Dakota Spann Add [I24.89] Subendocardial ischemia (HCC)     6/4/2025  5:26 AM Dakota Spann Add [J96.91] Hypoxic respiratory failure ([facility])     6/4/2025  5:26 AM Dakota Spann Add [J96.12] Chronic hypercapnic respiratory failure ([facility])     6/4/2025  6:36 AM Lore Silver Add [R79.89] Elevated troponin     6/4/2025  6:36 AM Lore Silver Add [N18.6] ESRD (end stage renal disease) ([facility])           ED Disposition       ED Disposition   Admit    Condition   Stable    Date/Time   Wed Jun 4, 2025  5:25 AM    Comment   Case was discussed with mable and the patient's admission status was agreed to be Admission Status: observation status to the service of Dr. silver .               Follow-up Information    None         - patient will call their PCP to let them know they were in the emergency department. We discuss return precautions and patient is agreeable with plan and aformentioned disposition.       - additional treatment intended, if consistent with primary provider:  - patient to follow with :      Patient's Medications   Discharge Prescriptions    No medications on file     No discharge procedures on file.  Prior to Admission Medications   Prescriptions Last Dose Informant Patient Reported? Taking?   NIFEdipine (PROCARDIA XL) 30 mg 24 hr tablet 6/3/2025 Outside Facility (Specify) No Yes   Sig: Take 1 tablet (30 mg total) by mouth daily after dinner   acetaminophen (TYLENOL) 325 mg tablet Past Week Outside Facility (Specify) No Yes   Sig: Take 2 tablets (650 mg total) by mouth every 6 (six) hours as needed for mild pain   allopurinol (ZYLOPRIM) 100 mg tablet 6/3/2025 Outside Facility (Specify) No Yes   Sig: Take 1 tablet (100 mg total) by mouth daily   apixaban (Eliquis) 5 mg   "Outside Facility (Specify) No No   Sig: Take 1 tablet (5 mg total) by mouth 2 (two) times a day   Patient not taking: Reported on 5/16/2025   aspirin 81 mg chewable tablet 6/3/2025  No Yes   Sig: Chew 1 tablet (81 mg total) daily   atorvastatin (LIPITOR) 80 mg tablet 6/3/2025 Outside Facility (Specify) No Yes   Sig: Take 1 tablet (80 mg total) by mouth daily   furosemide (LASIX) 20 mg tablet   Yes Yes   Sig: Take 20 mg by mouth 2 (two) times a day   gabapentin (NEURONTIN) 100 mg capsule 6/3/2025 Outside Facility (Specify) No Yes   Sig: Take 1 capsule (100 mg total) by mouth 3 (three) times a week   ipratropium-albuterol (DUO-NEB) 0.5-2.5 mg/3 mL nebulizer solution 6/3/2025  Yes Yes   Sig: Take 3 mL by nebulization 6 (six) times a day   lidocaine (LIDODERM) 5 %  Outside Facility (Specify) No No   Sig: Apply 1 patch topically over 12 hours daily Remove & Discard patch within 12 hours or as directed by MD   melatonin 3 mg 6/3/2025 Outside Facility (Specify) No Yes   Sig: Take 2 tablets (6 mg total) by mouth daily at bedtime   methocarbamol (ROBAXIN) 500 mg tablet  Outside Facility (Specify) No No   Sig: Take 1 tablet (500 mg total) by mouth 3 (three) times a day as needed for muscle spasms   polyethylene glycol (MIRALAX) 17 g packet Not Taking Outside Facility (Specify) No No   Sig: Take 17 g by mouth daily as needed (constipation)   Patient not taking: Reported on 6/4/2025   senna (SENOKOT) 8.6 mg 6/3/2025 Outside Facility (Specify) No Yes   Sig: Take 1 tablet (8.6 mg total) by mouth daily with lunch   sevelamer (RENAGEL) 800 mg tablet 6/3/2025 Outside Facility (Specify) No Yes   Sig: Take 2 tablets (1,600 mg total) by mouth 3 (three) times a day with meals      Facility-Administered Medications: None       Portions of the record may have been created with voice recognition software. Occasional wrong word or \"sound a like\" substitutions may have occurred due to the inherent limitations of voice recognition software. " Read the chart carefully and recognize, using context, where substitutions have occurred.    Electronically signed by:  MD Dakota Ryan MD  06/04/25 0802

## 2025-06-04 NOTE — ASSESSMENT & PLAN NOTE
Bilateral leg edema - L > R, no longer on anticoagulation  Will get venous duplex for further evaluation

## 2025-06-05 ENCOUNTER — APPOINTMENT (OUTPATIENT)
Dept: DIALYSIS | Facility: HOSPITAL | Age: 65
DRG: 177 | End: 2025-06-05
Payer: MEDICARE

## 2025-06-05 PROBLEM — U07.1 COVID-19: Status: ACTIVE | Noted: 2025-06-05

## 2025-06-05 PROBLEM — E11.3513 PROLIFERATIVE DIABETIC RETINOPATHY OF BOTH EYES WITH MACULAR EDEMA ASSOCIATED WITH TYPE 2 DIABETES MELLITUS (HCC): Status: ACTIVE | Noted: 2025-06-05

## 2025-06-05 LAB
ALBUMIN SERPL BCG-MCNC: 3.3 G/DL (ref 3.5–5)
ALP SERPL-CCNC: 75 U/L (ref 34–104)
ALT SERPL W P-5'-P-CCNC: 22 U/L (ref 7–52)
ANION GAP SERPL CALCULATED.3IONS-SCNC: 8 MMOL/L (ref 4–13)
APTT PPP: 62 SECONDS (ref 23–34)
APTT PPP: 87 SECONDS (ref 23–34)
AST SERPL W P-5'-P-CCNC: 63 U/L (ref 13–39)
BASOPHILS # BLD AUTO: 0.01 THOUSANDS/ÂΜL (ref 0–0.1)
BASOPHILS NFR BLD AUTO: 0 % (ref 0–1)
BILIRUB SERPL-MCNC: 0.46 MG/DL (ref 0.2–1)
BUN SERPL-MCNC: 26 MG/DL (ref 5–25)
CALCIUM ALBUM COR SERPL-MCNC: 8.9 MG/DL (ref 8.3–10.1)
CALCIUM SERPL-MCNC: 8.3 MG/DL (ref 8.4–10.2)
CARDIAC TROPONIN I PNL SERPL HS: ABNORMAL NG/L (ref 8–18)
CHLORIDE SERPL-SCNC: 94 MMOL/L (ref 96–108)
CO2 SERPL-SCNC: 30 MMOL/L (ref 21–32)
CREAT SERPL-MCNC: 4.89 MG/DL (ref 0.6–1.3)
EOSINOPHIL # BLD AUTO: 0 THOUSAND/ÂΜL (ref 0–0.61)
EOSINOPHIL NFR BLD AUTO: 0 % (ref 0–6)
ERYTHROCYTE [DISTWIDTH] IN BLOOD BY AUTOMATED COUNT: 14.8 % (ref 11.6–15.1)
EST. AVERAGE GLUCOSE BLD GHB EST-MCNC: 91 MG/DL
GFR SERPL CREATININE-BSD FRML MDRD: 11 ML/MIN/1.73SQ M
GLUCOSE SERPL-MCNC: 102 MG/DL (ref 65–140)
GLUCOSE SERPL-MCNC: 110 MG/DL (ref 65–140)
GLUCOSE SERPL-MCNC: 151 MG/DL (ref 65–140)
GLUCOSE SERPL-MCNC: 195 MG/DL (ref 65–140)
GLUCOSE SERPL-MCNC: 93 MG/DL (ref 65–140)
HBA1C MFR BLD: 4.8 %
HCT VFR BLD AUTO: 28.6 % (ref 36.5–49.3)
HGB BLD-MCNC: 8.8 G/DL (ref 12–17)
IMM GRANULOCYTES # BLD AUTO: 0.03 THOUSAND/UL (ref 0–0.2)
IMM GRANULOCYTES NFR BLD AUTO: 1 % (ref 0–2)
LYMPHOCYTES # BLD AUTO: 0.44 THOUSANDS/ÂΜL (ref 0.6–4.47)
LYMPHOCYTES NFR BLD AUTO: 12 % (ref 14–44)
MAGNESIUM SERPL-MCNC: 2 MG/DL (ref 1.9–2.7)
MCH RBC QN AUTO: 29.9 PG (ref 26.8–34.3)
MCHC RBC AUTO-ENTMCNC: 30.8 G/DL (ref 31.4–37.4)
MCV RBC AUTO: 97 FL (ref 82–98)
MONOCYTES # BLD AUTO: 0.44 THOUSAND/ÂΜL (ref 0.17–1.22)
MONOCYTES NFR BLD AUTO: 12 % (ref 4–12)
MRSA NOSE QL CULT: ABNORMAL
MRSA NOSE QL CULT: ABNORMAL
NEUTROPHILS # BLD AUTO: 2.68 THOUSANDS/ÂΜL (ref 1.85–7.62)
NEUTS SEG NFR BLD AUTO: 75 % (ref 43–75)
NRBC BLD AUTO-RTO: 0 /100 WBCS
PHOSPHATE SERPL-MCNC: 4.2 MG/DL (ref 2.3–4.1)
PLATELET # BLD AUTO: 184 THOUSANDS/UL (ref 149–390)
PMV BLD AUTO: 9.1 FL (ref 8.9–12.7)
POTASSIUM SERPL-SCNC: 4.7 MMOL/L (ref 3.5–5.3)
PROT SERPL-MCNC: 6.6 G/DL (ref 6.4–8.4)
RBC # BLD AUTO: 2.94 MILLION/UL (ref 3.88–5.62)
SODIUM SERPL-SCNC: 132 MMOL/L (ref 135–147)
WBC # BLD AUTO: 3.6 THOUSAND/UL (ref 4.31–10.16)

## 2025-06-05 PROCEDURE — 97163 PT EVAL HIGH COMPLEX 45 MIN: CPT

## 2025-06-05 PROCEDURE — 97167 OT EVAL HIGH COMPLEX 60 MIN: CPT

## 2025-06-05 PROCEDURE — 94760 N-INVAS EAR/PLS OXIMETRY 1: CPT

## 2025-06-05 PROCEDURE — 99232 SBSQ HOSP IP/OBS MODERATE 35: CPT | Performed by: FAMILY MEDICINE

## 2025-06-05 PROCEDURE — 85025 COMPLETE CBC W/AUTO DIFF WBC: CPT | Performed by: FAMILY MEDICINE

## 2025-06-05 PROCEDURE — 94660 CPAP INITIATION&MGMT: CPT

## 2025-06-05 PROCEDURE — 84484 ASSAY OF TROPONIN QUANT: CPT | Performed by: FAMILY MEDICINE

## 2025-06-05 PROCEDURE — 97530 THERAPEUTIC ACTIVITIES: CPT

## 2025-06-05 PROCEDURE — 80053 COMPREHEN METABOLIC PANEL: CPT | Performed by: FAMILY MEDICINE

## 2025-06-05 PROCEDURE — 94640 AIRWAY INHALATION TREATMENT: CPT

## 2025-06-05 PROCEDURE — G0257 UNSCHED DIALYSIS ESRD PT HOS: HCPCS

## 2025-06-05 PROCEDURE — 82948 REAGENT STRIP/BLOOD GLUCOSE: CPT

## 2025-06-05 PROCEDURE — 83735 ASSAY OF MAGNESIUM: CPT | Performed by: FAMILY MEDICINE

## 2025-06-05 PROCEDURE — 85730 THROMBOPLASTIN TIME PARTIAL: CPT | Performed by: FAMILY MEDICINE

## 2025-06-05 PROCEDURE — 84100 ASSAY OF PHOSPHORUS: CPT | Performed by: INTERNAL MEDICINE

## 2025-06-05 PROCEDURE — 99214 OFFICE O/P EST MOD 30 MIN: CPT | Performed by: INTERNAL MEDICINE

## 2025-06-05 RX ORDER — CALCIUM CARBONATE 500 MG/1
500 TABLET, CHEWABLE ORAL 2 TIMES DAILY PRN
Status: DISCONTINUED | OUTPATIENT
Start: 2025-06-05 | End: 2025-06-13 | Stop reason: HOSPADM

## 2025-06-05 RX ORDER — FAMOTIDINE 20 MG/1
20 TABLET, FILM COATED ORAL
Status: DISCONTINUED | OUTPATIENT
Start: 2025-06-05 | End: 2025-06-13 | Stop reason: HOSPADM

## 2025-06-05 RX ORDER — METOPROLOL SUCCINATE 25 MG/1
25 TABLET, EXTENDED RELEASE ORAL EVERY 12 HOURS SCHEDULED
Status: DISCONTINUED | OUTPATIENT
Start: 2025-06-05 | End: 2025-06-13 | Stop reason: HOSPADM

## 2025-06-05 RX ADMIN — SEVELAMER HYDROCHLORIDE 1600 MG: 800 TABLET, FILM COATED ORAL at 16:41

## 2025-06-05 RX ADMIN — IPRATROPIUM BROMIDE AND ALBUTEROL SULFATE 3 ML: .5; 3 SOLUTION RESPIRATORY (INHALATION) at 13:11

## 2025-06-05 RX ADMIN — ASPIRIN 81 MG: 81 TABLET, CHEWABLE ORAL at 08:33

## 2025-06-05 RX ADMIN — SEVELAMER HYDROCHLORIDE 1600 MG: 800 TABLET, FILM COATED ORAL at 08:32

## 2025-06-05 RX ADMIN — HYDROMORPHONE HYDROCHLORIDE 0.2 MG: 0.2 INJECTION, SOLUTION INTRAMUSCULAR; INTRAVENOUS; SUBCUTANEOUS at 00:04

## 2025-06-05 RX ADMIN — METOPROLOL SUCCINATE 25 MG: 25 TABLET, EXTENDED RELEASE ORAL at 21:06

## 2025-06-05 RX ADMIN — ALLOPURINOL 100 MG: 100 TABLET ORAL at 15:17

## 2025-06-05 RX ADMIN — Medication 2.5 MG: at 05:34

## 2025-06-05 RX ADMIN — Medication 2.5 MG: at 19:58

## 2025-06-05 RX ADMIN — HEPARIN SODIUM 15.1 UNITS/KG/HR: 10000 INJECTION, SOLUTION INTRAVENOUS at 21:07

## 2025-06-05 RX ADMIN — HYDROMORPHONE HYDROCHLORIDE 0.2 MG: 0.2 INJECTION, SOLUTION INTRAMUSCULAR; INTRAVENOUS; SUBCUTANEOUS at 21:06

## 2025-06-05 RX ADMIN — SENNOSIDES 8.6 MG: 8.6 TABLET, FILM COATED ORAL at 12:58

## 2025-06-05 RX ADMIN — IPRATROPIUM BROMIDE AND ALBUTEROL SULFATE 3 ML: .5; 3 SOLUTION RESPIRATORY (INHALATION) at 07:39

## 2025-06-05 RX ADMIN — REMDESIVIR 100 MG: 100 INJECTION, POWDER, LYOPHILIZED, FOR SOLUTION INTRAVENOUS at 13:13

## 2025-06-05 RX ADMIN — Medication 2.5 MG: at 12:58

## 2025-06-05 RX ADMIN — HYDROMORPHONE HYDROCHLORIDE 0.2 MG: 0.2 INJECTION, SOLUTION INTRAMUSCULAR; INTRAVENOUS; SUBCUTANEOUS at 08:30

## 2025-06-05 RX ADMIN — DEXAMETHASONE SODIUM PHOSPHATE 6 MG: 10 INJECTION, SOLUTION INTRAMUSCULAR; INTRAVENOUS at 12:59

## 2025-06-05 RX ADMIN — ATORVASTATIN CALCIUM 80 MG: 80 TABLET, FILM COATED ORAL at 16:41

## 2025-06-05 RX ADMIN — SEVELAMER HYDROCHLORIDE 1600 MG: 800 TABLET, FILM COATED ORAL at 12:58

## 2025-06-05 RX ADMIN — FAMOTIDINE 20 MG: 20 TABLET, FILM COATED ORAL at 23:39

## 2025-06-05 RX ADMIN — INSULIN LISPRO 1 UNITS: 100 INJECTION, SOLUTION INTRAVENOUS; SUBCUTANEOUS at 16:41

## 2025-06-05 RX ADMIN — METOPROLOL SUCCINATE 25 MG: 25 TABLET, EXTENDED RELEASE ORAL at 12:58

## 2025-06-05 RX ADMIN — HEPARIN SODIUM 15.1 UNITS/KG/HR: 10000 INJECTION, SOLUTION INTRAVENOUS at 05:33

## 2025-06-05 RX ADMIN — LIDOCAINE 5% 1 PATCH: 700 PATCH TOPICAL at 08:33

## 2025-06-05 RX ADMIN — HYDROMORPHONE HYDROCHLORIDE 0.2 MG: 0.2 INJECTION, SOLUTION INTRAMUSCULAR; INTRAVENOUS; SUBCUTANEOUS at 15:17

## 2025-06-05 RX ADMIN — IPRATROPIUM BROMIDE AND ALBUTEROL SULFATE 3 ML: .5; 3 SOLUTION RESPIRATORY (INHALATION) at 20:38

## 2025-06-05 NOTE — DISCHARGE INSTR - OTHER ORDERS
Wound Care Plan:    1-Apply VASHE soaked gauze to bilateral foot wounds x 5 minutes and pat dry. Apply Normlgel to wounds, Adaptic cut to size of wounds, gauze or ABD, rolled gauze and tape. Change 3x/week and as needed for soilage/dislodgement.  2-Apply Prevent barrier cream or equivalent to bilateral sacrum, buttock and heels 2x/day and as needed.  3-Float heels on 2 pillows in bed to offload pressure so heels are not in contact with mattress or pillows.  4-Use pressure redistribution cushion in chair when out of bed if able. Avoid prolonged sitting.  5-Moisturize skin daily with skin nourishing cream.  6-Turn/reposition every 2 hours in bed and at least every half hour when out of bed to chair for pressure re-distribution on skin.

## 2025-06-05 NOTE — PROGRESS NOTES
Progress Note - Cardiology   Name: Naresh Carpio 64 y.o. male I MRN: [id number]  Unit/Bed#: [address] [place] [address] I Date of Admission: 6/4/2025   Date of Service: 6/5/2025 I Hospital Day: 0    Assessment & Plan  Acute respiratory failure with hypoxia (HCC)  in the setting of noncompliance with hemodialysis  admission chest x-ray with pulmonary edema  patient tested positive for Covid  started on Remdesivir  Elevated troponin  high-sensitivity troponins:  1304 (0hr), 5884 (2hr), > 22,973(4hr)  12 lead EKG with ST depression seen in inferior lateral leads of 2 to 3 mmHg  concerning for NSTEMI  6/4/2025 TTE: EF visibly estimated 45-50% with grade 1 diastolic dysfunction, there is moderate calcification of the aortic valve with mild regurgitation, there's mild mitral valve regurgitation. When compared to previous study of 10/9/2024 EF has decreased from 60% to 45 to 50%  increase Toprol-XL to 25 mg BID  continue aspirin 81 mg once a day  continue IV heparin ACS protocol, 48 hours will complete on 6/6/2025 at 6 AM  6/5/2025 patient at this time refusing cardiac catheterization, he states he does not want any further testing.  Volume overload secondary to noncompliance with hemodialysis  Wt Readings from Last 3 Encounters:   06/04/25 90.7 kg (200 lb)   05/13/25 92.6 kg (204 lb 3.2 oz)   05/05/25 96.6 kg (213 lb)   BNP 2283  volume overload secondary to noncompliance with hemodialysis missing treatment and cutting treatment short.  Receiving extra hemodialysis treatment today and will resume Tuesday/Thursday/Saturday schedule  patient states breathing has improved since he is receiving his hemodialysis  will increase Toprol-XL to 25 mg BID  ESRD (end stage renal disease) (HCC)  Lab Results   Component Value Date    EGFR 11 06/05/2025    EGFR 7 06/04/2025    EGFR 10 05/13/2025    CREATININE 4.89 (H) 06/05/2025    CREATININE 7.00 (H) 06/04/2025    CREATININE 5.49 (H) 05/13/2025   followed by nephrology   on a Tuesday, Thursday,  Saturday schedule  receiving extra dose of hemodialysis today  Essential hypertension  blood pressures currently stable  continue to monitor  discontinue Procardia xl 30 mg daily due to decrease in EF  will increase Toprol-XL to 25 mg BID  Diabetes mellitus type 2 with peripheral artery disease (HCC)  Lab Results   Component Value Date    HGBA1C 4.8 06/04/2025       Recent Labs     06/04/25  1053 06/04/25  1610 06/04/25  2019 06/05/25  0728   POCGLU 86 112 103 93       Blood Sugar Average: Last 72 hrs:  (P) 102.4  managed for primary team  Paroxysmal atrial fibrillation (HCC)  first diagnosed it in 2023  patient states he is not had any further episodes of atrial fibrillation  patient previously was on Coreg but self stopped,  will increase Toprol-XL 25 mg BID  Bilateral leg edema  appears to be chronic and venous in origin  6//2025 bilateral lower extremity duplex notes no acute or chronic DVT bilaterally, no evidence of superficial thrombophlebitis    Subjective   Chief Complaint: Patient seen and examined. He is not in a good mood today. His main concern is of lower extremity edema and back pain. He states he does not wish any further testing, such as cardiac catheterization. He is aware of his risks of CAD.      Objective :  Temp:  [97.6 °F (36.4 °C)-98.2 °F (36.8 °C)] 97.6 °F (36.4 °C)  HR:  [60-83] 60  BP: ()/(47-90) 102/51  Resp:  [14-20] 14  SpO2:  [87 %-100 %] 95 %  O2 Device: None (Room air)  Nasal Cannula O2 Flow Rate (L/min):  [2 L/min] 2 L/min  Orthostatic Blood Pressures      Flowsheet Row Most Recent Value   Blood Pressure 102/51 filed at 06/05/2025 1000   Patient Position - Orthostatic VS Lying filed at 06/05/2025 0850          First Weight: Weight - Scale: 90.8 kg (200 lb 2.8 oz) (06/04/25 0609)  Vitals:    06/04/25 0609 06/04/25 1415   Weight: 90.8 kg (200 lb 2.8 oz) 90.7 kg (200 lb)     Physical Exam  Vitals and nursing note reviewed.   Constitutional:       Appearance: Normal appearance. He  is normal weight. He is ill-appearing (Chronically).   HENT:      Right Ear: External ear normal.      Left Ear: External ear normal.     Eyes:      General:         Right eye: No discharge.         Left eye: No discharge.       Cardiovascular:      Rate and Rhythm: Normal rate and regular rhythm.      Pulses: Normal pulses.      Heart sounds: Murmur heard.   Pulmonary:      Effort: Pulmonary effort is normal. No accessory muscle usage or respiratory distress.      Breath sounds: Examination of the right-lower field reveals rales. Examination of the left-lower field reveals rales. Rales present.   Abdominal:      General: There is no distension.      Palpations: Abdomen is soft.     Musculoskeletal:      Right lower leg: Edema present.      Left lower leg: Edema present.     Skin:     General: Skin is warm and dry.      Capillary Refill: Capillary refill takes less than 2 seconds.     Neurological:      Mental Status: He is alert and oriented to person, place, and time. Mental status is at baseline.     Psychiatric:         Mood and Affect: Mood normal.           Lab Results: I have reviewed the following results:  Results from last 7 days   Lab Units 06/05/25 0343 06/04/25  0359   WBC Thousand/uL 3.60* 8.53   HEMOGLOBIN g/dL 8.8* 9.6*   HEMATOCRIT % 28.6* 31.8*   PLATELETS Thousands/uL 184 218     Results from last 7 days   Lab Units 06/05/25  0343 06/04/25  0359   POTASSIUM mmol/L 4.7 5.4*   CHLORIDE mmol/L 94* 91*   CO2 mmol/L 30 27   BUN mg/dL 26* 40*   CREATININE mg/dL 4.89* 7.00*   CALCIUM mg/dL 8.3* 9.0     Results from last 7 days   Lab Units 06/05/25  0343 06/04/25 2011 06/04/25  1229 06/04/25  0538   INR   --   --   --  1.12   PTT seconds 62* 50* 49* 27     Lab Results   Component Value Date    HGBA1C 4.8 06/04/2025     Lab Results   Component Value Date    CKTOTAL 43 02/08/2025     Telemetry demonstrates sinus rhythm    VTE Pharmacologic Prophylaxis: VTE covered by:  heparin (porcine), Intravenous, 15.1  Units/kg/hr at 06/05/25 0533  heparin (porcine), Intravenous, 2,000 Units at 06/04/25 2112  heparin (porcine), Intravenous         Lynne BURNS  Cardiology

## 2025-06-05 NOTE — ASSESSMENT & PLAN NOTE
appears to be chronic and venous in origin  6//2025 bilateral lower extremity duplex notes no acute or chronic DVT bilaterally, no evidence of superficial thrombophlebitis

## 2025-06-05 NOTE — ASSESSMENT & PLAN NOTE
BP is at goal.   Home Rx: Nifedipine 30 mg daily, furosemide 20 mg twice a day.  Current Rx: Nifedipine 30 mg daily, Metoprolol succ 25 mg OD.   Monitor BP with UF on HD.

## 2025-06-05 NOTE — ASSESSMENT & PLAN NOTE
high-sensitivity troponins:  1304 (0hr), 5884 (2hr), > 22,973(4hr)  12 lead EKG with ST depression seen in inferior lateral leads of 2 to 3 mmHg  concerning for NSTEMI  6/4/2025 TTE: EF visibly estimated 45-50% with grade 1 diastolic dysfunction, there is moderate calcification of the aortic valve with mild regurgitation, there's mild mitral valve regurgitation. When compared to previous study of 10/9/2024 EF has decreased from 60% to 45 to 50%  increase Toprol-XL to 25 mg BID  continue aspirin 81 mg once a day  continue IV heparin ACS protocol, 48 hours will complete on 6/6/2025 at 6 AM  6/5/2025 patient at this time refusing cardiac catheterization, he states he does not want any further testing.

## 2025-06-05 NOTE — OCCUPATIONAL THERAPY NOTE
Occupational Therapy Evaluation/Treatment       06/05/25 1410   OT Last Visit   OT Visit Date 06/05/25   Note Type   Note type Evaluation   Pain Assessment   Pain Assessment Tool 0-10   Pain Score 8   Pain Location/Orientation Location: Back;Orientation: Lower   Restrictions/Precautions   Weight Bearing Precautions Per Order No   Braces or Orthoses LSO   Other Precautions Chair Alarm;Bed Alarm;Contact/isolation;Airborne/isolation;Fall Risk;Pain;Visual impairment   Home Living   Type of Home SNF  ([facility] [place])   Home Layout One level;Performs ADLs on one level;Stairs to enter with rails  (1 flight of stair to enter SNF)   Bathroom Equipment Commode   Home Equipment Walker;Cane;Other (Comment)  (LSO brace worn when HOB >45 and OOB)   Additional Comments   (Patient is currently living at [facility].)   Prior Function   Level of [place] Needs assistance with ADLs;Needs assistance with IADLS;Needs assistance with functional mobility   Lives With Facility staff   Receives Help From Family;Other (Comment)  (Facility Staff)   IADLs Family/Friend/Other provides transportation;Family/Friend/Other provides medication management;Family/Friend/Other provides meals   Falls in the last 6 months 1 to 4  (As per chart.)   Vocational On disability   Lifestyle   Intrinsic Gratification Pt has 3 grandchildren and two daughters.   ADL   Eating Assistance 5  Supervision/Setup   Eating Deficit Setup;Supervision/safety   Grooming Assistance 4  Minimal Assistance   Grooming Deficit Setup;Increased time to complete;Standing with assistive device   UB Bathing Assistance 4  Minimal Assistance   UB Bathing Deficit Setup;Supervision/safety;Increased time to complete   LB Bathing Assistance 3  Moderate Assistance   LB Bathing Deficit Setup;Supervision/safety;Increased time to complete   UB Dressing Assistance 4  Minimal Assistance   UB Dressing Deficit Setup;Supervision/safety;Increased time to complete   LB  Dressing Assistance 3  Moderate Assistance   LB Dressing Deficit Setup;Supervision/safety;Increased time to complete   Toileting Assistance  3  Moderate Assistance   Toileting Deficit Setup;Supervison/safety;Increased time to complete   Bed Mobility   Rolling R 5  Supervision   Supine to Sit 5  Supervision   Transfers   Sit to Stand 4  Minimal assistance   Additional items Assist x 1   Stand to Sit 5  Supervision   Additional items Armrests   Functional Mobility   Functional Mobility 4  Minimal assistance   Additional Comments Pt ambulated short household distance from bed to reclining chair.   Additional items Rolling walker   Balance   Static Sitting Fair +   Dynamic Sitting Fair   Static Standing Fair   Dynamic Standing Fair -   Activity Tolerance   Activity Tolerance Patient tolerated treatment well   Nurse Made Aware Yes   RUE Assessment   RUE Assessment WNL   LUE Assessment   LUE Assessment WNL   Hand Function   Gross Motor Coordination Functional   Fine Motor Coordination Functional   Vision-Basic Assessment   Current Vision Other (Comment)  (Pt reports to have blurry vision in left and right eyes. Pt reports right eye vision is much more blurry than left eye vision.)   Visual History Other (Comment)  (PMHx of retinopathy)   Patient Visual Report Blurring of print when reading   Vision - Complex Assessment   Ocular Range of Motion Intact   Tracking Intact   Acuity   (Unable to read small print greater than 5-10 inches away from face. Unable to read clock on wall or read words on television. Able to detect motion from changing screen on television and depict shapes within 5-10 feet.)   Visual Screen Results Decreased visual acuity   Cognition   Overall Cognitive Status WFL   Arousal/Participation Alert;Responsive;Cooperative   Attention Within functional limits   Orientation Level Oriented X4   Memory Within functional limits   Following Commands Follows all commands and directions without difficulty    Assessment   Limitation Decreased ADL status;Decreased Safe judgement during ADL;Decreased high-level ADLs;Decreased self-care trans;Decreased endurance   Prognosis Good   Assessment Patient evaluated by Occupational Therapy.  Patient admitted with Acute respiratory failure with hypoxia (HCC).  The patients occupational profile, medical and therapy history includes a extensive additional review of physical, cognitive, or psychosocial history related to current functional performance.  Comorbidities affecting functional mobility and ADLS include: CKD, COVID-19, diabetes, ESRD on hemodialysis, hypertension, osteoarthritis, and PAD.  Prior to admission, patient was requiring assist for functional mobility with RW, requiring assist for ADLS, requiring assist for IADLS, in short term rehab, having 24 hour care , and on disability.  The evaluation identifies the following performance deficits: weakness, impaired balance, decreased endurance, increased fall risk, new onset of impairment of functional mobility, decreased ADLS, decreased IADLS, pain, decreased activity tolerance, decreased safety awareness, decreased sensation, decreased strength, and visual deficits, that result in activity limitations and/or participation restrictions. This evaluation requires clinical decision making of high complexity, because the patient presents with comorbidites that affect occupational performance and required significant modification of tasks or assistance with consideration of multiple treatment options.  The Barthel Index was used as a functional outcome tool presenting with a score of Barthel Index Score: 45, indicating marked limitations of functional mobility and ADLS.  The patient's raw score on the -PAC Daily Activity Inpatient Short Form is 15. A raw score of less than 19 suggests the patient may benefit from discharge to post-acute rehabilitation services. Please refer to the recommendation of the Occupational Therapist  for safe discharge planning.  Patient will benefit from skilled Occupational Therapy services to address above deficits and facilitate a safe return to prior level of function.   Goals   Patient Goals To be able to be with his daughters and grandchildren.   STG Time Frame   (1-7 days)   Short Term Goal  Goals established to promote Patient Goals: To be able to be with his daughters and grandchildren.: Eating: independent; Grooming: supervision seated; Bathing: min assist; Upper Body Dressing supervision; Lower Body Dressing: min assist; Toileting: min assist; Patient will increase ambulatory standard toilet transfer to supervision with rolling walker to increase performance and safety with ADLS and functional mobility; Patient will increase standing tolerance to 10 minutes during ADL task to decrease assistance level and decrease fall risk; Patient will increase bed mobility to independent in preparation for ADLS and transfers; Patient will increase functional mobility to and from bathroom with rolling walker with supervision to increase performance with ADLS and to use a toilet; Patient will tolerate 10 minutes of UE ROM/strengthening to increase general activity tolerance and performance in ADLS/IADLS; Patient will improve functional activity tolerance to 10 minutes of sustained functional tasks to increase participation in basic self-care and decrease assistance level; Patient will be able to to verbalize understanding and perform energy conservation/proper body mechanics during ADLS and functional mobility at least 50% of the time with moderate cueing to decrease signs of fatigue and increase stamina to return to prior level of function; Patient will increase dynamic sitting balance to fair+ to improve the ability to sit at edge of bed or on a chair for ADLS; Patient will increase dynamic standing balance to fair to improve postural stability and decrease fall risk during standing ADLS and transfers.   LTG Time  Frame   (8-14 days)   Long Term Goal Goals established to promote Patient Goals: To be able to be with his daughters and grandchildren.:; Grooming: independent seated; Bathing: supervision; Upper Body Dressing independent; Lower Body Dressing: supervision; Toileting: supervision; Patient will increase ambulatory standard toilet transfer to independent with rolling walker to increase performance and safety with ADLS and functional mobility; Patient will increase standing tolerance to 15 minutes during ADL task to decrease assistance level and decrease fall risk; Patient will increase functional mobility to and from bathroom with rolling walker with supervision to increase performance with ADLS and to use a toilet; Patient will tolerate 15 minutes of UE ROM/strengthening to increase general activity tolerance and performance in ADLS/IADLS; Patient will improve functional activity tolerance to 15 minutes of sustained functional tasks to increase participation in basic self-care and decrease assistance level; Patient will be able to to verbalize understanding and perform energy conservation/proper body mechanics during ADLS and functional mobility at least 75% of the time with minimum cueing to decrease signs of fatigue and increase stamina to return to prior level of function; Patient will increase dynamic sitting balance to good to improve the ability to sit at edge of bed or on a chair for ADLS; Patient will increase dynamic standing balance to fair+ to improve postural stability and decrease fall risk during standing ADLS and transfers. Pt will score >/= 19/24 on AM-PAC Daily Activity Inpatient scale to promote safe independence with ADLs and functional mobility; Pt will score >/= 75/100 on Barthel Index in order to decrease caregiver assistance needed and increase ability to perform ADLs and functional mobility.   Plan   Treatment Interventions ADL retraining;Visual perceptual retraining;Functional transfer  "training;Endurance training;Equipment evaluation/education;Energy conservation;Activityengagement;UE strengthening/ROM;Fine motor coordination activities   Goal Expiration Date 06/19/25   OT Frequency 3-5x/wk   Discharge Recommendation   Rehab Resource Intensity Level, OT II (Moderate Resource Intensity)   AM-PAC Daily Activity Inpatient   Lower Body Dressing 2   Bathing 2   Toileting 2   Upper Body Dressing 3   Grooming 3   Eating 3   Daily Activity Raw Score 15   Daily Activity Standardized Score (Calc for Raw Score >=11) 34.69   AM-PAC Applied Cognition Inpatient   Following a Speech/Presentation 4   Understanding Ordinary Conversation 4   Taking Medications 4   Remembering Where Things Are Placed or Put Away 4   Remembering List of 4-5 Errands 4   Taking Care of Complicated Tasks 4   Applied Cognition Raw Score 24   Applied Cognition Standardized Score 62.21   Barthel Index   Feeding 5   Bathing 0   Grooming Score 0   Dressing Score 5   Bladder Score 10   Bowels Score 10   Toilet Use Score 5   Transfers (Bed/Chair) Score 10   Mobility (Level Surface) Score 0   Stairs Score 0   Barthel Index Score 45   Additional Treatment Session   Start Time 1400   End Time 1410   Treatment Assessment S: \"My feet are numb I can't them, it feel squishy when I walk.\" Patient was pleasant and cooperative throughout the session today. O: Sit to stand transfer on reclining chair using RW with minimum assistance. Patient ambulated short household distance from reclining chair, through the room, and back to the reclining chair using RW with supervision assistance. Stand to sit transfer on reclining chair using armrest with supervision. A: Patient seen for OT treatment this PM. Patient currently requires minimum assistance during functional transfers and supervision during mobility with rolling walker. The patient is limited by pain, impaired vision in right and left eyes, decreased endurance, and decreased safe judgement. Patient is a " high fall risk. At the end of session, patient was seated in reclining chair with all needs within reach. P: Level II- Moderate Resource Intensity.   End of Consult   Education Provided Yes   Patient Position at End of Consult Bedside chair;Bed/Chair alarm activated;All needs within reach   Nurse Communication Nurse aware of consult   Licensure   [place] License Number  Bandar Percey OTS

## 2025-06-05 NOTE — ASSESSMENT & PLAN NOTE
Patient has been following up with ophthalmology in the outpatient setting.  Patient underwent intravitreal Avastin treatment with Dr. Zapata on May 30th. Patient also sees Dr. Chaudhry. Spoke with Dr. Chaudhry who advised that patient should follow up with  upon discharge.

## 2025-06-05 NOTE — ASSESSMENT & PLAN NOTE
Lab Results   Component Value Date    EGFR 11 06/05/2025    EGFR 7 06/04/2025    EGFR 10 05/13/2025    CREATININE 4.89 (H) 06/05/2025    CREATININE 7.00 (H) 06/04/2025    CREATININE 5.49 (H) 05/13/2025     On HD TTS  Only completed 1 hour of dialysis on Tuesday per report given to ER  Nephrology following. Patient underwent dialysis yesterday and will be undergoing dialysis again today.

## 2025-06-05 NOTE — ASSESSMENT & PLAN NOTE
Lab Results   Component Value Date    EGFR 11 06/05/2025    EGFR 7 06/04/2025    EGFR 10 05/13/2025    CREATININE 4.89 (H) 06/05/2025    CREATININE 7.00 (H) 06/04/2025    CREATININE 5.49 (H) 05/13/2025

## 2025-06-05 NOTE — ASSESSMENT & PLAN NOTE
Cardiology consulted.  They are planning for cardiac catheterization potentially tomorrow, Friday, 6/6/2025.

## 2025-06-05 NOTE — PHYSICAL THERAPY NOTE
"       PHYSICAL THERAPY EVALUATION/TREATMENT     06/05/25 1350   PT Last Visit   PT Visit Date 06/05/25   Note Type   Note type Evaluation   Pain Assessment   Pain Assessment Tool 0-10   Pain Score 8   Pain Location/Orientation Location: Back  (Pt had pain meds prior to PT)   Restrictions/Precautions   Weight Bearing Precautions Per Order No   Braces or Orthoses (S)  LSO  (to be worn when sitting >45 degrees or when OOB.)   Other Precautions Airborne/isolation;Contact/isolation;Chair Alarm;Bed Alarm;Fall Risk;Pain  (HD)   Home Living   Type of Home   (Pt admitted from [facility])   Home Equipment Cane;Walker   Prior Function   Level of [place] Needs assistance with ADLs;Needs assistance with functional mobility  (Pt has been ambulatory with a walker and his LSO at [facility].)   Falls in the last 6 months 1 to 4   Vocational On disability   General   Additional Pertinent History Pt admitted from [facility] with acute respiratory failure and COVID+ and hypoxia.  Pt has clinically improved and is not on RA with Sp02 95%. Pt reports that he is feeling better since admission but is very concerned about his poor eyesight.   Family/Caregiver Present No   Cognition   Overall Cognitive Status WFL   Arousal/Participation Alert   Attention Within functional limits   Orientation Level Oriented X4   Memory Decreased recall of recent events   Following Commands Follows multistep commands with increased time or repetition   Subjective   Subjective \"I'm worried about my eyes\" \"I want to be able to see my grandkids\"   RLE Assessment   RLE Assessment   (ROM WFL, MMT 4/5, decreased sensation in foot)   LLE Assessment   LLE Assessment   (ROM WFL, MMT 4/5, decreased sensation in foot)   Bed Mobility   Supine to Sit 5  Supervision   Transfers   Sit to Stand 4  Minimal assistance   Stand to Sit 4  Minimal assistance   Ambulation/Elevation   Gait pattern   (very flexed posture)   Gait Assistance 4  Minimal assist   Assistive Device Rolling walker "   Distance 30 feet   Balance   Static Sitting Fair +   Static Standing Fair   Ambulatory Fair   Activity Tolerance   Activity Tolerance Patient limited by fatigue;Patient tolerated treatment well   Assessment   Prognosis Good   Problem List Decreased strength;Decreased endurance;Impaired balance;Decreased mobility;Decreased range of motion;Pain   Assessment Pt is 64 y.o. male seen for PT evaluation s/p admit to [facility] on 6/4/2025 w/ Acute respiratory failure with hypoxia (HCC). PT consulted to assess pt's functional mobility and d/c needs. Order placed for PT eval and tx, w/ up as tolerated order.  Co-morbidities affecting patient's physical performance include: ESRD on HD, falls, vertebral fx, OA L hip, diabetic retinopathy with loss of vision.  Personal factors affecting patient at time of initial evaluation include: fall history, limited home support, inability to ambulate without external assist.        Prior to admission, patient was independent with functional mobility with a rolling walker.  Pt has been hospitalized or in STR for several months. Upon evaluation: pt ambulated 30 feet with a walker with min assist.          Please find objective findings from Physical Therapy assessment regarding body systems outlined above with impairments and limitations including weakness, decreased ROM, impaired balance, pain, decreased activity tolerance, decreased functional mobility tolerance, fall risk, and orthopedic restrictions.The Barthel Index was used as a functional outcome tool presenting with a score of Barthel Index Score: 55 today indicating marked limitations of functional mobility and ADLS.  Patient's clinical presentation is currently unstable/unpredictable as seen in patient's presentation of changing level of pain, increased fall risk, new onset of impairment of functional mobility, decreased endurance, and new onset of weakness. Pt would benefit from continued Physical Therapy treatment to  "address deficits as defined above and maximize level of functional mobility.         The patient's -[facility] Basic Mobility Inpatient Short Form Raw Score is 18. A Raw score of greater than 16 suggests the patient may benefit from discharge to home, however pt may benefit from post acute rehab services due to limited home support and poor vision. Please also refer to the recommendation of the Physical Therapist for safe discharge planning.   Goals   Patient Goals \"I want to see my grandkids\"   STG Expiration Date 06/12/25   Short Term Goal #1 1.  independent transfers,   2. supervision ambulation with a walker/LSO x 100 feet   LTG Expiration Date 06/19/25   Long Term Goal #1 1. improve standing posture to at least fair to allow for improved body mechanics and pain,   2. modified independent ambulation with a walker 200 feet so pt can walk into his dialysis appointments.   Plan   Treatment/Interventions LE strengthening/ROM;Functional transfer training;ADL retraining;Therapeutic exercise;Endurance training;Elevations;Gait training;Bed mobility;Equipment eval/education;Patient/family training   PT Frequency 3-5x/wk   Discharge Recommendation   Rehab Resource Intensity Level, PT II (Moderate Resource Intensity)   AM-PAC Basic Mobility Inpatient   Turning in Flat Bed Without Bedrails 4   Lying on Back to Sitting on Edge of Flat Bed Without Bedrails 3   Moving Bed to Chair 3   Standing Up From Chair Using Arms 3   Walk in Room 3   Climb 3-5 Stairs With Railing 2   Basic Mobility Inpatient Raw Score 18   Basic Mobility Standardized Score 41.05   [facility] Highest Level Of Mobility   -HLM Goal 6: Walk 10 steps or more   -HLM Achieved 7: Walk 25 feet or more   Barthel Index   Feeding 10   Bathing 0   Grooming Score 0   Dressing Score 5   Bladder Score 10   Bowels Score 10   Toilet Use Score 5   Transfers (Bed/Chair) Score 10   Mobility (Level Surface) Score 0   Stairs Score 0   Barthel Index Score 50   Additional " "Treatment Session   Start Time 1340   End Time 1350   Treatment Assessment S:  \"I really want to find out what is going on with my eyes.\"   O:  Pt agreed to ambulate one more time in the room with supervision/min assist with cues for upright posture. Pt ambulated x 30 feet.   A: Pt demonstrates a very flexed posture but a generally steady gait with the walker. Endurance to activity was limited by general fatigue.    P: Encourage OOB/ambualtion with walker with LSO.   End of Consult   Patient Position at End of Consult All needs within reach;Bed/Chair alarm activated;Bedside chair   Licensure   [place] License Number  Niharika Cabrales PT [id number]     "

## 2025-06-05 NOTE — ASSESSMENT & PLAN NOTE
Felt to be due to fluid overload + infectious etiology.   COVID positive on admission.   Of note, troponin levels elevated and cards evaluating for NSTEMI.

## 2025-06-05 NOTE — ASSESSMENT & PLAN NOTE
Patient presents from rehab facility due to shortness of breath.  He was noted to be hypoxic to 84% on room air and in respiratory distress by medics  Initially required BiPAP in the ER and currently on 4 L  VBG showed pH of 7.3 with PCO2 of 52 and PO2 of 30  Likely in the setting of volume overload due to noncompliance with dialysis sessions  Chest x-ray appears to be pulmonary edema however official read is pending  Further management with dialysis.  Titrate off oxygen as tolerated  COVID pending    6/4 - Secondary to volume overload from missed hemodialysis.  Continue hemodialysis per recommendations from nephrology.  Patient also found to be positive for COVID-19 which may also be contributing.

## 2025-06-05 NOTE — ASSESSMENT & PLAN NOTE
[facility] TTS  Access: R IJ permcath  EDW 94.7 kg.   Had urgent HD treatment on Wednesday, 6/4/25, due to hyperkalemia and fluid overload.  Plan for regular HD treatment today.

## 2025-06-05 NOTE — ASSESSMENT & PLAN NOTE
blood pressures currently stable  continue to monitor  discontinue Procardia xl 30 mg daily due to decrease in EF  will increase Toprol-XL to 25 mg BID

## 2025-06-05 NOTE — ASSESSMENT & PLAN NOTE
Wt Readings from Last 3 Encounters:   06/04/25 90.7 kg (200 lb)   05/13/25 92.6 kg (204 lb 3.2 oz)   05/05/25 96.6 kg (213 lb)   BNP 2283  volume overload secondary to noncompliance with hemodialysis missing treatment and cutting treatment short.  Receiving extra hemodialysis treatment today and will resume Tuesday/Thursday/Saturday schedule  patient states breathing has improved since he is receiving his hemodialysis  will increase Toprol-XL to 25 mg BID

## 2025-06-05 NOTE — ASSESSMENT & PLAN NOTE
Bilateral leg edema - L > R, no longer on anticoagulation  Bilateral lower extremity venous duplex negative for DVT.

## 2025-06-05 NOTE — CASE MANAGEMENT
Case Management Assessment & Discharge Planning Note    Patient name Naresh Carpio  Location [address] [address]/[address] [address]-* MRN [id number]  : 1960 Date 2025       Current Admission Date: 2025  Current Admission Diagnosis:Acute respiratory failure with hypoxia ([facility])   Patient Active Problem List    Diagnosis Date Noted    Proliferative diabetic retinopathy of both eyes with macular edema associated with type 2 diabetes mellitus ([facility]) 2025    Bilateral leg edema 2025    Ambulatory dysfunction 2025    Retinopathy 2025    At risk for acid-base imbalance 2025    Electrolyte imbalance risk 2025    Hallucinations 2025    Type 2 diabetes mellitus with foot ulcer, without long-term current use of insulin ([facility]) 2025    Insomnia 2025    Hyperphosphatemia 2025    Wound of skin 2025    At high risk for skin breakdown 2025    At risk for venous thromboembolism (VTE) 2025    Impaired mobility and activities of daily living 2025    Visual disturbance 2025    Pseudoaneurysm (HCC) 2025    Acute pain due to trauma 2025    ESRD on dialysis ([facility]) 2025    Wounds, multiple 2025    Traumatic retroperitoneal hemorrhage 2025    Secondary hyperparathyroidism ([facility]) 2025    At risk for electrolyte imbalance 2025    Multiple open wounds of foot 2025    Disorder of acid-base balance 2025    L2 vertebral fracture ([facility]) 2025    Non-compliance with treatment 2025    Generalized weakness 2025    Gout 2025    Closed fracture of proximal end of left humerus with routine healing 02/10/2025    Left shoulder pain 2025    ESRD (end stage renal disease) on dialysis ([facility]) 2025    Primary hypertension 2025    Anemia in ESRD (end-stage renal disease)  ([facility]) 2025    Chronic kidney disease-mineral and bone disorder 2025    Renal lesion 2024     Chronic wound 11/20/2024    Acute respiratory failure with hypoxia ([facility]) 11/19/2024    Volume overload secondary to noncompliance with hemodialysis 11/19/2024    Pleural effusion 11/19/2024    Elevated troponin 11/19/2024    Paroxysmal atrial fibrillation ([facility])     ESRD (end stage renal disease) ([facility]) 10/25/2024    Hyponatremia 10/19/2024    Acute on chronic anemia 10/14/2024    PAD (peripheral artery disease) ([facility]) 10/08/2024    Hyperkalemia 04/06/2024    Difficulty with speech 03/19/2024    History of amputation of hallux ([facility]) 03/18/2024    At risk for constipation 09/06/2023    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 mellitus, with necrosis of bone ([facility])     Acquired deformity of foot, right     Charcot's joint     Open wound of right foot 08/21/2023    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 mellitus, with bone involvement without evidence of necrosis ([facility])     Diabetic ulcer of left midfoot associated with type 2 diabetes mellitus, limited to breakdown of skin ([facility])     Diabetic polyneuropathy associated with type 2 diabetes mellitus (HCC)     Diabetes mellitus type 2 with peripheral artery disease ([facility])     History of amputation of lesser toe of left foot ([facility])     Onychomycosis     Traumatic retroperitoneal hematoma 08/19/2023    Osteoarthritis of left hip 07/27/2022    Severe Left Orchalgia 07/26/2022    Right shoulder pain 07/26/2022    SIRS (systemic inflammatory response syndrome) ([facility]) 07/26/2022    Left hip pain 07/06/2022    Hemodialysis status ([facility])     Hypervolemia     Chronic anemia 01/21/2022    History of prediabetes     Essential hypertension     History of TIA (transient ischemic attack)     T10 vertebral fracture ([facility])       LOS (days): 0  Geometric Mean LOS (GMLOS) (days):   Days to GMLOS:     OBJECTIVE:  PATIENT READMITTED TO HOSPITAL       Current admission status: Inpatient  Referral Reason: Other (Discharge planning)    Preferred Pharmacy:    [id number] - [place], [place] - [address]  [address]  [facility] [address]  Phone: [phone] Fax: [phone]    [id number] - [place], [place] - [address]  [address]  [facility] [address]  Phone: [phone] Fax: [phone]    Primary Care Provider: Jeffrey Jackson    Primary Insurance: MEDICARE  Secondary Insurance:     ASSESSMENT:  Active Health Care Proxies       Alicja Carpio Health Care Representative - Sister   Primary Phone: [phone] (Mobile)                  Obs Notice Signed: 06/04/25    Readmission Root Cause  30 Day Readmission: Yes  During your hospital stay, did someone (provider, nurse, ) explain your care to you in a way you could understand?: Yes  Did you feel medically stable to leave the hospital?: Yes  Were you able to pay for your medication at the pharmacy?:  (Discharged to [facility])  Did you have reliable transportation to take you to your appointments?: Yes  During previous admission, was a post-acute recommendation made?: Yes  What post-acute resources were offered?: [facility]  Patient was readmitted due to: Acute respiratory failure, COVID, elevated troponin  Action Plan: Medical management, return to [facility] at [facility]    Patient Information  Admitted from:: Facility ([facility])  Mental Status: Alert  During Assessment patient was accompanied by: Not accompanied during assessment  Assessment information provided by:: Patient  Primary Caregiver: Other (Comment)  Caregiver's Name:: [facility]  Caregiver's Relationship to Patient:: Facility Staff  Caregiver's Telephone Number:: [phone]  Support Systems: Family members  County of Residence: [facility] [place] [facility] [place]  What city do you live in?: [place]  Home entry access options. Select all that apply.: Stairs  Number of steps to enter home.: One Flight  Type of Current Residence: Apartment  Floor Level: 1  Upon entering residence, is there a  bedroom on the main floor (no further steps)?: Yes  Upon entering residence, is there a bathroom on the main floor (no further steps)?: Yes  Living Arrangements: Lives Alone    Activities of Daily Living Prior to Admission  Functional Status: Assistance  Completes ADLs independently?: No  Level of ADL dependence: Assistance  Ambulates independently?: No  Level of ambulatory dependence: Assistance  Does patient use assisted devices?: Yes  Assisted Devices (DME) used: Other (Comment) (TLSO brace)  Does patient currently own DME?: Yes  What DME does the patient currently own?: Other (Comment), Walker, Straight Cane, Bedside Commode (TLSO brace)  Does the patient have a history of Short-Term Rehab?: Yes ([facility], [facility])  Does patient have a history of HHC?: Yes  Does patient currently have HHC?: No    Patient Information Continued  Income Source: SSI/SSD  Does patient receive dialysis treatments?: Yes ([facility], transported by [facility])    Means of Transportation  Means of Transport to Appts:: Family transport      DISCHARGE DETAILS:    Discharge planning discussed with:: Patient  Freedom of Choice: Yes    Comments - Freedom of Choice: SW spoke with patient at bedside to discuss discharge planning.  Patient was admitted from [facility] at [facility] and facility has accepted him back Medicaid-pending.  Patient expressed preference to return home but understands that his mobility and vision loss make it difficult for him to care for himself currently.  Patient is in agreement with return to [facility] and SW will continue to follow.      CM contacted family/caregiver?: No- see comments (Patient will update sister)  Were Treatment Team discharge recommendations reviewed with patient/caregiver?: Yes  Did patient/caregiver verbalize understanding of patient care needs?: N/A- going to facility  Were patient/caregiver advised of the risks associated with not following  Treatment Team discharge recommendations?: Yes    Contacts  Patient Contacts: Alicja Carpio (sister)  Relationship to Patient:: Family  Contact Method: Phone  Phone Number: [phone]    Requested Home Health Care         Is the patient interested in HHC at discharge?: No    DME Referral Provided  Referral made for DME?: No    Other Referral/Resources/Interventions Provided:  Interventions: Short Term Rehab, SNF    Would you like to participate in our [facility] service program?  : No - Declined    Treatment Team Recommendation: Short Term Rehab, SNF  Discharge Destination Plan:: Short Term Rehab, SNF  Transport at Discharge : Wheelchair van

## 2025-06-05 NOTE — PROGRESS NOTES
NEPHROLOGY HOSPITAL PROGRESS NOTE   Naresh Carpio 64 y.o. male MRN: [id number]  Unit/Bed#: [address] [place] [address] Encounter: [id number]  Reason for Consult: ESRD on HD    Assessment & Plan  ESRD (end stage renal disease) ([facility])  [facility]  Access: PATRICIA chacon  EDW 94.7 kg.   Had urgent HD treatment on Wednesday, 6/4/25, due to hyperkalemia and fluid overload.  Plan for regular HD treatment today.    Acute respiratory failure with hypoxia ([facility])  Felt to be due to fluid overload + infectious etiology.   COVID positive on admission.   Of note, troponin levels elevated and cards evaluating for NSTEMI.     Elevated troponin  Cardiology consulted.  They are planning for cardiac catheterization potentially tomorrow, Friday, 6/6/2025.    Essential hypertension  BP is at goal.   Home Rx: Nifedipine 30 mg daily, furosemide 20 mg twice a day.  Current Rx: Nifedipine 30 mg daily, Metoprolol succ 25 mg OD.   Monitor BP with UF on HD.    Volume overload secondary to noncompliance with hemodialysis  10/9/24 Echo EF 60-65%, G1DD  Now with elevated troponin.     Anemia in ESRD (end-stage renal disease)  ([facility])  Hgb is below goal at 8.8.    He is on Mircera 150 mcg every 2 weeks and Venofer.   Monitor Hgb.     Chronic kidney disease-mineral and bone disorder  He is not on calcitriol in the outpatient setting.  Home Rx: Sevelamer.   Continue Sevelamer 1600 mg TID  Phos at goal.     Bilateral leg edema  This is chronic.       TODAY's DISCUSSION / PLAN:  HD today.   Cardiology planning for cardiac cath tomorrow (if patient agrees)  Vision issues per SLIM.     SUBJECTIVE / 24H INTERVAL HISTORY:  Upset this morning.   Reports that he lost vision and was supposed to see ophthalmology.   No CP or SOB.   Tolerated HD yesterday.     OBJECTIVE:  Current Weight: Weight - Scale: 90.7 kg (200 lb)  Vitals:    06/05/25 0252 06/05/25 0726 06/05/25 0739 06/05/25 0750   BP: (!) 132/48 124/53     Pulse: 62 63 63    Resp:   18    Temp:  98.2 °F  "(36.8 °C)     TempSrc:       SpO2: 97% 96% 94% 100%   Weight:       Height:           Intake/Output Summary (Last 24 hours) at 6/5/2025 0900  Last data filed at 6/5/2025 0700  Gross per 24 hour   Intake 981.8 ml   Output 3902 ml   Net -2920.2 ml     General: conscious, cooperative, no distress  Skin: dry  Eyes: pink conjunctivae  ENT: moist mucous membranes  Respiratory: equal chest expansion, clear breath sounds.  Cardiovascular: distinct heart sounds, normal rate, regular rhythm, no rub  Abdomen: soft, non tender, non distended, normal bowel sounds  Extremities: + bilateral LE edema.   Genitourinary: no gaitan catheter.   Neuro: awake, alert.   Psych: agitated    Medications:  Current Medications[1]    Laboratory Results:  Results from last 7 days   Lab Units 06/05/25  0343 06/04/25  0359   WBC Thousand/uL 3.60* 8.53   HEMOGLOBIN g/dL 8.8* 9.6*   HEMATOCRIT % 28.6* 31.8*   PLATELETS Thousands/uL 184 218   POTASSIUM mmol/L 4.7 5.4*   CHLORIDE mmol/L 94* 91*   CO2 mmol/L 30 27   BUN mg/dL 26* 40*   CREATININE mg/dL 4.89* 7.00*   CALCIUM mg/dL 8.3* 9.0   MAGNESIUM mg/dL 2.0  --    PHOSPHORUS mg/dL 4.2*  --      Portions of the record may have been created with voice recognition software. Occasional wrong word or \"sound a like\" substitutions may have occurred due to the inherent limitations of voice recognition software. Read the chart carefully and recognize, using context, where substitutions have occurred.If you have any questions, please contact the dictating provider.         [1]   Current Facility-Administered Medications:     acetaminophen (TYLENOL) tablet 650 mg, 650 mg, Oral, Q6H PRN, Lore Revankar, DO    allopurinol (ZYLOPRIM) tablet 100 mg, 100 mg, Oral, Once per day on Tuesday Thursday Saturday, Lore Revankar, DO    aspirin chewable tablet 81 mg, 81 mg, Oral, Daily, Lore Revankar, DO    atorvastatin (LIPITOR) tablet 80 mg, 80 mg, Oral, Daily With Dinner, Lore Silver, DO, 80 mg at 06/04/25 2043    " dexamethasone (PF) (DECADRON) injection 6 mg, 6 mg, Intravenous, Q24H, Milan Ruiz MD, 6 mg at 06/04/25 1210    [Held by provider] gabapentin (NEURONTIN) capsule 100 mg, 100 mg, Oral, Once per day on Monday Wednesday Friday, Lore Silver DO    heparin (porcine) 25,000 units in 0.45% NaCl 250 mL infusion (premix), 3-20 Units/kg/hr (Order-Specific), Intravenous, Titrated, Lore Silver DO, Last Rate: 13.6 mL/hr at 06/05/25 0533, 15.1 Units/kg/hr at 06/05/25 0533    heparin (porcine) injection 2,000 Units, 2,000 Units, Intravenous, Q6H PRN, Lore Silver DO, 2,000 Units at 06/04/25 2112    heparin (porcine) injection 4,000 Units, 4,000 Units, Intravenous, Q6H PRN, Lore Silver DO    HYDROmorphone HCl (DILAUDID) injection 0.2 mg, 0.2 mg, Intravenous, Q4H PRN, GRANT Rich, 0.2 mg at 06/05/25 0830    insulin lispro (HumALOG/ADMELOG) 100 units/mL subcutaneous injection 1-5 Units, 1-5 Units, Subcutaneous, TID AC **AND** Fingerstick Glucose (POCT), , , TID AC, Lore Silver DO    ipratropium-albuterol (DUO-NEB) 0.5-2.5 mg/3 mL inhalation solution 3 mL, 3 mL, Nebulization, TID, Milan Ruiz MD, 3 mL at 06/05/25 0739    lidocaine (LIDODERM) 5 % patch 1 patch, 1 patch, Topical, Daily, Lore Sivler DO, 1 patch at 06/05/25 0833    metoprolol succinate (TOPROL-XL) 24 hr tablet 25 mg, 25 mg, Oral, HS, RAMEZ ShellNP, 25 mg at 06/04/25 2109    NIFEdipine (PROCARDIA XL) 24 hr tablet 30 mg, 30 mg, Oral, After Dinner, Lore Silver DO, 30 mg at 06/04/25 2042    oxyCODONE (ROXICODONE) split tablet 2.5 mg, 2.5 mg, Oral, Q8H PRN, GRANT Rich, 2.5 mg at 06/05/25 0534    [COMPLETED] remdesivir (Veklury) 200 mg in sodium chloride 0.9 % 290 mL IVPB, 200 mg, Intravenous, Q24H, Stopped at 06/05/25 0645 **FOLLOWED BY** remdesivir (Veklury) 100 mg in sodium chloride 0.9 % 270 mL IVPB, 100 mg, Intravenous, Q24H, Milan Ruiz MD    senna (SENOKOT) tablet 8.6 mg, 8.6 mg, Oral,  Daily With Lunch, Lore Silver DO    sevelamer (RENAGEL) tablet 1,600 mg, 1,600 mg, Oral, TID With Meals, Lore Silver DO, 1,600 mg at 06/05/25 0832

## 2025-06-05 NOTE — ASSESSMENT & PLAN NOTE
He is not on calcitriol in the outpatient setting.  Home Rx: Sevelamer.   Continue Sevelamer 1600 mg TID  Phos at goal.

## 2025-06-05 NOTE — ASSESSMENT & PLAN NOTE
Patient with significant troponin elevation greater than 22,973, now trending down.  No chest pain.  Concern for NSTEMI.  Continue IV heparin for now per recommendations from cardiology.  Patient is adamantly refusing cardiac catheterization.

## 2025-06-05 NOTE — ASSESSMENT & PLAN NOTE
Lab Results   Component Value Date    HGBA1C 4.8 06/04/2025       Recent Labs     06/04/25  1053 06/04/25  1610 06/04/25 2019 06/05/25  0728   POCGLU 86 112 103 93       Blood Sugar Average: Last 72 hrs:  (P) 102.4  managed for primary team

## 2025-06-05 NOTE — WOUND OSTOMY CARE
Progress Note - Wound   Naresh Carpio 64 y.o. male MRN: [id number]  Unit/Bed#: [address] [place] [address] Encounter: [id number]        Assessment:   This is a 64 year old male patient admitted today with acute respiratory failure due to non-compliance with hemodialysis treatments. Patient tested positive for COVID-19 on admission. Patient AAO x 3 and agreeable to have wound visit done but was agitated.    Assessment Findings:  1-Full thickness diabetic/traumatic wounds to bilateral feet with no drainage. Orders are in place for wound care. Ambulatory referral to wound care was placed by Provider.  2-Bilateral heels and sacrobuttocks are intact - orders are in place for skin care and for prevention.    Wound Care Plan:  1-Apply VASHE soaked gauze to bilateral foot wounds x 5 minutes and pat dry. Apply Normlgel to wounds, Adaptic cut to size of wounds, gauze or ABD, rolled gauze and tape. Change every other day & prn soilage/dislodgement.  2-Apply Prevent barrier cream to bilateral sacrum, buttock and heels BID and PRN  3-Float heels on 2 pillows to offload pressure so heels are not in contact with mattress or pillows.Patient refused Prevalon boots.  4-Ehob pressure redistribution cushion in chair when out of bed if able. Avoid prolonged sitting.  5-Moisturize skin daily with skin nourishing cream.  6-Turn/reposition q2h or when medically stable for pressure re-distribution on skin.     Wound 03/24/25 Diabetic Ulcer Foot Right;Medial (Active)   Wound Image   06/04/25 1452   Wound Description Slough;Yellow 06/04/25 1452   Non-staged Wound Description Full thickness 06/04/25 1452   Wound Length (cm) 0.7 cm 06/04/25 1452   Wound Width (cm) 1 cm 06/04/25 1452   Wound Depth (cm) 0.2 cm 06/04/25 1452   Wound Surface Area (cm^2) 0.55 cm^2 06/04/25 1452   Wound Volume (cm^3) 0.073 cm^3 06/04/25 1452   Calculated Wound Volume (cm^3) 0.14 cm^3 06/04/25 1452   Change in Wound Size % -40 06/04/25 1452   Drainage Amount None 06/04/25 145    Liliana-wound Assessment Intact 06/04/25 1452   Treatments Cleansed 06/04/25 1452   Dressing Normlgel;Adaptic;Gauze;Dry dressing 06/04/25 1452   Dressing Changed New 06/04/25 1452   Dressing Status Clean;Dry;Intact 06/04/25 1452   Wound 06/04/25 Diabetic Ulcer Plantar Left (Active)   Wound Image   06/04/25 1448   Wound Description Dry;Parker;Eschar 06/04/25 1448   Non-staged Wound Description Full thickness 06/04/25 1448   Wound Length (cm) 0.5 cm 06/04/25 1448   Wound Width (cm) 0.5 cm 06/04/25 1448   Wound Depth (cm) 0.2 cm 06/04/25 1448   Wound Surface Area (cm^2) 0.2 cm^2 06/04/25 1448   Wound Volume (cm^3) 0.026 cm^3 06/04/25 1448   Calculated Wound Volume (cm^3) 0.05 cm^3 06/04/25 1448   Drainage Amount None 06/04/25 1448   Treatments Cleansed 06/04/25 1448   Dressing Normlgel;ABD;Dry dressing 06/04/25 1448   Dressing Changed New 06/04/25 1448   Dressing Status Clean;Dry;Intact 06/04/25 1448       Wound 06/04/25 Diabetic Ulcer Toe D3, third Anterior;Left (Active)   Wound Image   06/04/25 1451   Wound Description Dry;Black;Eschar 06/04/25 1451   Non-staged Wound Description Full thickness 06/04/25 1451   Wound Length (cm) 0.3 cm 06/04/25 1451   Wound Width (cm) 0.3 cm 06/04/25 1451   Wound Depth (cm) 0.1 cm 06/04/25 1451   Wound Surface Area (cm^2) 0.07 cm^2 06/04/25 1451   Wound Volume (cm^3) 0.005 cm^3 06/04/25 1451   Calculated Wound Volume (cm^3) 0.01 cm^3 06/04/25 1451   Drainage Amount None 06/04/25 1451   Liliana-wound Assessment Intact;Dry;Scaly 06/04/25 1451   Treatments Cleansed 06/04/25 1451   Dressing Normlgel;Gauze;Dry dressing 06/04/25 1451   Dressing Changed New 06/04/25 1451   Dressing Status Clean;Dry;Intact 06/04/25 1451     Discussed assessment findings, and plan of care/recommendations with Marcelino RAWLS.    Wound care will follow along with patient weekly, please call or text with questions and concerns.    Recommendations written as orders.  Love Andrew MSN, RN, CWON

## 2025-06-05 NOTE — ASSESSMENT & PLAN NOTE
Wt Readings from Last 3 Encounters:   06/04/25 90.7 kg (200 lb)   05/13/25 92.6 kg (204 lb 3.2 oz)   05/05/25 96.6 kg (213 lb)     Patient clinically appears volume overloaded from not completing dialysis session  Received 80 mg of IV Lasix in the ER  Hemodialysis per nephrology

## 2025-06-05 NOTE — ASSESSMENT & PLAN NOTE
Lab Results   Component Value Date    HGBA1C 4.8 06/04/2025       Recent Labs     06/04/25  1610 06/04/25  2019 06/05/25  0728 06/05/25  1117   POCGLU 112 103 93 110       (P) [id number] patient on Accu-Cheks with SSI.  Update A1c

## 2025-06-05 NOTE — PROGRESS NOTES
Progress Note - Hospitalist   Name: Naresh Carpio 64 y.o. male I MRN: [id number]  Unit/Bed#: [address] [place] [address] I Date of Admission: 6/4/2025   Date of Service: 6/5/2025 I Hospital Day: 0     Assessment & Plan  Acute respiratory failure with hypoxia (HCC)  Patient presents from rehab facility due to shortness of breath.  He was noted to be hypoxic to 84% on room air and in respiratory distress by medics  Initially required BiPAP in the ER and currently on 4 L  VBG showed pH of 7.3 with PCO2 of 52 and PO2 of 30  Likely in the setting of volume overload due to noncompliance with dialysis sessions  Chest x-ray appears to be pulmonary edema however official read is pending  Further management with dialysis.  Titrate off oxygen as tolerated  COVID pending    6/4 - Secondary to volume overload from missed hemodialysis.  Continue hemodialysis per recommendations from nephrology.  Patient also found to be positive for COVID-19 which may also be contributing.  Elevated troponin  Patient with significant troponin elevation greater than 22,973, now trending down.  No chest pain.  Concern for NSTEMI.  Continue IV heparin for now per recommendations from cardiology.  Patient is adamantly refusing cardiac catheterization.  ESRD (end stage renal disease) ([facility])  Lab Results   Component Value Date    EGFR 11 06/05/2025    EGFR 7 06/04/2025    EGFR 10 05/13/2025    CREATININE 4.89 (H) 06/05/2025    CREATININE 7.00 (H) 06/04/2025    CREATININE 5.49 (H) 05/13/2025     On HD TTS  Only completed 1 hour of dialysis on Tuesday per report given to ER  Nephrology following. Patient underwent dialysis yesterday and will be undergoing dialysis again today.  Bilateral leg edema  Bilateral leg edema - L > R, no longer on anticoagulation  Bilateral lower extremity venous duplex negative for DVT.  Essential hypertension  Continue Procardia XL and monitor blood pressures  SIRS (systemic inflammatory response syndrome) ([facility])  As noted by tachycardia,  tachypnea due to volume overload.  Lactic acid within normal limits.  No obvious signs of infection, hold off on antibiotics  Blood cultures drawn in the ER  Diabetes mellitus type 2 with peripheral artery disease ([facility])  Lab Results   Component Value Date    HGBA1C 4.8 06/04/2025       Recent Labs     06/04/25  1610 06/04/25  2019 06/05/25  0728 06/05/25  1117   POCGLU 112 103 93 110       (P) [id number] patient on Accu-Cheks with SSI.  Update A1c    Paroxysmal atrial fibrillation (HCC)  Continue Procardia XL.  Per recent discharge summary, patient was refusing Eliquis due to his recent history of retroperitoneal hematoma in April 2025  Volume overload secondary to noncompliance with hemodialysis  Wt Readings from Last 3 Encounters:   06/04/25 90.7 kg (200 lb)   05/13/25 92.6 kg (204 lb 3.2 oz)   05/05/25 96.6 kg (213 lb)     Patient clinically appears volume overloaded from not completing dialysis session  Received 80 mg of IV Lasix in the ER  Hemodialysis per nephrology  Anemia in ESRD (end-stage renal disease)  ([facility])  Hemoglobin 8.8. Will continue to monitor.  L2 vertebral fracture ([facility])  Per prior note, patient to utilize LSO brace  PT/OT while here  Chronic kidney disease-mineral and bone disorder  Lab Results   Component Value Date    EGFR 11 06/05/2025    EGFR 7 06/04/2025    EGFR 10 05/13/2025    CREATININE 4.89 (H) 06/05/2025    CREATININE 7.00 (H) 06/04/2025    CREATININE 5.49 (H) 05/13/2025     Proliferative diabetic retinopathy of both eyes with macular edema associated with type 2 diabetes mellitus (HCC)    Patient has been following up with ophthalmology in the outpatient setting.  Patient underwent intravitreal Avastin treatment with Dr. Zapata on May 30th. Patient also sees Dr. Chaudhry. Spoke with Dr. Chaudhry who advised that patient should follow up with  upon discharge.  COVID-19  Continue remdesivir and dexamethasone.  VTE Pharmacologic Prophylaxis:   Heparin  drip    Mobility:   Basic Mobility Inpatient Raw Score: 11  -[facility] Goal: 4: Move to chair/commode  -[facility] Achieved: 4: Move to chair/commode    Patient Centered Rounds: I performed bedside rounds with nursing staff today.     Current Length of Stay: 0 day(s)  Current Patient Status: Inpatient   Certification Statement: The patient will continue to require additional inpatient hospital stay due to above.  Discharge Plan: TBD    Code Status: Level 1 - Full Code    Subjective   Patient was seen and examined at bedside. No acute events overnight. No chest pain. Patient does report gradually worsening vision over at least the last month that he is seeing ophthalmology for the outpatient setting.    Objective :  Temp:  [97.2 °F (36.2 °C)-98.2 °F (36.8 °C)] 97.2 °F (36.2 °C)  HR:  [60-79] 65  BP: ()/(46-83) 105/59  Resp:  [14-20] 16  SpO2:  [87 %-100 %] 96 %  O2 Device: Nasal cannula  Nasal Cannula O2 Flow Rate (L/min):  [2 L/min] 2 L/min    Body mass index is 26.39 kg/m².     Input and Output Summary (last 24 hours):     Intake/Output Summary (Last 24 hours) at 6/5/2025 1427  Last data filed at 6/5/2025 1220  Gross per 24 hour   Intake 981.8 ml   Output 3900 ml   Net -2918.2 ml     Physical Exam  HENT:      Head: Normocephalic.      Mouth/Throat:      Mouth: Mucous membranes are moist.     Eyes:      Extraocular Movements: Extraocular movements intact.       Cardiovascular:      Rate and Rhythm: Normal rate.   Pulmonary:      Effort: Pulmonary effort is normal. No respiratory distress.      Comments: Decreased in the bases  Abdominal:      Palpations: Abdomen is soft.      Tenderness: There is no abdominal tenderness.     Musculoskeletal:      Comments: Bilateral lower extremity edema     Skin:     General: Skin is warm.     Neurological:      Mental Status: He is alert and oriented to person, place, and time.     Psychiatric:         Mood and Affect: Mood normal.       Lines/Drains:  Lines/Drains/Airways        Active Status       Name Placement date Placement time Site Days    HD Permanent Double Catheter 08/30/23  --  Internal jugular  645                  Telemetry:  Telemetry Orders (From admission, onward)               24 Hour Telemetry Monitoring  Continuous x 24 Hours (Telem)        Expiring   Question:  Reason for 24 Hour Telemetry  Answer:  Metabolic/electrolyte disturbance with high probability of dysrhythmia. K level <3 or >6 OR KCL infusion >10mEq/hr                  Lab Results: I have reviewed the following results:   Results from last 7 days   Lab Units 06/05/25  0343   WBC Thousand/uL 3.60*   HEMOGLOBIN g/dL 8.8*   HEMATOCRIT % 28.6*   PLATELETS Thousands/uL 184   SEGS PCT % 75   LYMPHO PCT % 12*   MONO PCT % 12   EOS PCT % 0     Results from last 7 days   Lab Units 06/05/25  0343   SODIUM mmol/L 132*   POTASSIUM mmol/L 4.7   CHLORIDE mmol/L 94*   CO2 mmol/L 30   BUN mg/dL 26*   CREATININE mg/dL 4.89*   ANION GAP mmol/L 8   CALCIUM mg/dL 8.3*   ALBUMIN g/dL 3.3*   TOTAL BILIRUBIN mg/dL 0.46   ALK PHOS U/L 75   ALT U/L 22   AST U/L 63*   GLUCOSE RANDOM mg/dL 102     Results from last 7 days   Lab Units 06/04/25  0538   INR  1.12     Results from last 7 days   Lab Units 06/05/25  1117 06/05/25  0728 06/04/25  2019 06/04/25  1610 06/04/25  1053 06/04/25  0809   POC GLUCOSE mg/dl 110 93 103 112 86 118     Results from last 7 days   Lab Units 06/04/25 2011   HEMOGLOBIN A1C % 4.8     Results from last 7 days   Lab Units 06/04/25  0359   LACTIC ACID mmol/L 1.8     Recent Cultures (last 7 days):   Results from last 7 days   Lab Units 06/04/25  0538 06/04/25  0359   BLOOD CULTURE  No Growth at 24 hrs. No Growth at 24 hrs.     Last 24 Hours Medication List:     Current Facility-Administered Medications:     acetaminophen (TYLENOL) tablet 650 mg, Q6H PRN    allopurinol (ZYLOPRIM) tablet 100 mg, Once per day on Tuesday Thursday Saturday    aspirin chewable tablet 81 mg, Daily    atorvastatin (LIPITOR) tablet 80 mg,  Daily With Dinner    dexamethasone (PF) (DECADRON) injection 6 mg, Q24H    [Held by provider] gabapentin (NEURONTIN) capsule 100 mg, Once per day on Monday Wednesday Friday    heparin (porcine) 25,000 units in 0.45% NaCl 250 mL infusion (premix), Titrated, Last Rate: 15.1 Units/kg/hr (06/05/25 0533)    heparin (porcine) injection 2,000 Units, Q6H PRN    heparin (porcine) injection 4,000 Units, Q6H PRN    HYDROmorphone HCl (DILAUDID) injection 0.2 mg, Q4H PRN    insulin lispro (HumALOG/ADMELOG) 100 units/mL subcutaneous injection 1-5 Units, TID AC **AND** Fingerstick Glucose (POCT), TID AC    ipratropium-albuterol (DUO-NEB) 0.5-2.5 mg/3 mL inhalation solution 3 mL, TID    lidocaine (LIDODERM) 5 % patch 1 patch, Daily    metoprolol succinate (TOPROL-XL) 24 hr tablet 25 mg, Q12H KEMAL    oxyCODONE (ROXICODONE) split tablet 2.5 mg, Q8H PRN    [COMPLETED] remdesivir (Veklury) 200 mg in sodium chloride 0.9 % 290 mL IVPB, Q24H, Last Rate: Stopped (06/05/25 0645) **FOLLOWED BY** remdesivir (Veklury) 100 mg in sodium chloride 0.9 % 270 mL IVPB, Q24H    senna (SENOKOT) tablet 8.6 mg, Daily With Lunch    sevelamer (RENAGEL) tablet 1,600 mg, TID With Meals    Administrative Statements   Today, Patient Was Seen By: Milan Ruiz MD    **Please Note: This note may have been constructed using a voice recognition system.**

## 2025-06-05 NOTE — ASSESSMENT & PLAN NOTE
first diagnosed it in 2023  patient states he is not had any further episodes of atrial fibrillation  patient previously was on Coreg but self stopped,  will increase Toprol-XL 25 mg BID

## 2025-06-05 NOTE — PLAN OF CARE
Post-Dialysis RN Treatment Note    Blood Pressure:  Pre 132/76 mm/Hg  Post 105/59 mmHg   EDW:  94.7 kg    Weight:  Pre Not Applicable kg   Post Not Applicable kg   Mode of weight measurement: N/A/ pt is sitting on the bed due to back pain/ unable to weigh   Volume Removed:  3000 ml    Treatment duration: 210 minutes    NS given:  No    Treatment shortened Yes, describe: pt request/ low BP   Medications given during Rx: Not Applicable   Estimated Kt/V:  Not Applicable   Access type: Permacath/TDC   Needle Gauge:    Access Issues: No    Report called to primary nurse:   Yes / Luisana Kimble RN      3000 ml as tolerated, 4 hrs, 2K bath  Problem: METABOLIC, FLUID AND ELECTROLYTES - ADULT  Goal: Electrolytes maintained within normal limits  Description: INTERVENTIONS:  - Monitor labs and assess patient for signs and symptoms of electrolyte imbalances  - Administer electrolyte replacement as ordered  - Monitor response to electrolyte replacements, including repeat lab results as appropriate  - Instruct patient on fluid and nutrition as appropriate  Outcome: Progressing  Goal: Fluid balance maintained  Description: INTERVENTIONS:  - Monitor labs   - Monitor I/O and WT  - Instruct patient on fluid and nutrition as appropriate  - Assess for signs & symptoms of volume excess or deficit  Outcome: Progressing

## 2025-06-05 NOTE — PLAN OF CARE
Problem: PHYSICAL THERAPY ADULT  Goal: Performs mobility at highest level of function for planned discharge setting.  See evaluation for individualized goals.  Description: Treatment/Interventions: LE strengthening/ROM, Functional transfer training, ADL retraining, Therapeutic exercise, Endurance training, Elevations, Gait training, Bed mobility, Equipment eval/education, Patient/family training          See flowsheet documentation for full assessment, interventions and recommendations.  Note: Prognosis: Good  Problem List: Decreased strength, Decreased endurance, Impaired balance, Decreased mobility, Decreased range of motion, Pain  Assessment: Pt is 64 y.o. male seen for PT evaluation s/p admit to [facility] on 6/4/2025 w/ Acute respiratory failure with hypoxia (HCC). PT consulted to assess pt's functional mobility and d/c needs. Order placed for PT eval and tx, w/ up as tolerated order.  Co-morbidities affecting patient's physical performance include: ESRD on HD, falls, vertebral fx, OA L hip, diabetic retinopathy with loss of vision.  Personal factors affecting patient at time of initial evaluation include: fall history, limited home support, inability to ambulate without external assist.    Prior to admission, patient was independent with functional mobility with a rolling walker .  Upon evaluation: pt ambulated 30 feet with a walker with min assist.      Please find objective findings from Physical Therapy assessment regarding body systems outlined above with impairments and limitations including weakness, decreased ROM, impaired balance, pain, decreased activity tolerance, decreased functional mobility tolerance, fall risk, and orthopedic restrictions.The Barthel Index was used as a functional outcome tool presenting with a score of Barthel Index Score: 55 today indicating marked limitations of functional mobility and ADLS.  Patient's clinical presentation is currently unstable/unpredictable as seen  in patient's presentation of changing level of pain, increased fall risk, new onset of impairment of functional mobility, decreased endurance, and new onset of weakness. Pt would benefit from continued Physical Therapy treatment to address deficits as defined above and maximize level of functional mobility.     The patient's AM-[facility] Basic Mobility Inpatient Short Form Raw Score is 18. A Raw score of greater than 16 suggests the patient may benefit from discharge to home, however pt may benefit from post acute rehab services due to limited home support and poor vision. Please also refer to the recommendation of the Physical Therapist for safe discharge planning.        Rehab Resource Intensity Level, PT: II (Moderate Resource Intensity)    See flowsheet documentation for full assessment.

## 2025-06-05 NOTE — ASSESSMENT & PLAN NOTE
Hgb is below goal at 8.8.    He is on Mircera 150 mcg every 2 weeks and Venofer.   Monitor Hgb.

## 2025-06-06 LAB
ANION GAP SERPL CALCULATED.3IONS-SCNC: 9 MMOL/L (ref 4–13)
APTT PPP: 79 SECONDS (ref 23–34)
BUN SERPL-MCNC: 34 MG/DL (ref 5–25)
CALCIUM SERPL-MCNC: 8.4 MG/DL (ref 8.4–10.2)
CHLORIDE SERPL-SCNC: 95 MMOL/L (ref 96–108)
CO2 SERPL-SCNC: 27 MMOL/L (ref 21–32)
CREAT SERPL-MCNC: 4.11 MG/DL (ref 0.6–1.3)
ERYTHROCYTE [DISTWIDTH] IN BLOOD BY AUTOMATED COUNT: 14.8 % (ref 11.6–15.1)
GFR SERPL CREATININE-BSD FRML MDRD: 14 ML/MIN/1.73SQ M
GLUCOSE SERPL-MCNC: 166 MG/DL (ref 65–140)
GLUCOSE SERPL-MCNC: 171 MG/DL (ref 65–140)
GLUCOSE SERPL-MCNC: 171 MG/DL (ref 65–140)
GLUCOSE SERPL-MCNC: 173 MG/DL (ref 65–140)
GLUCOSE SERPL-MCNC: 188 MG/DL (ref 65–140)
HCT VFR BLD AUTO: 28.4 % (ref 36.5–49.3)
HGB BLD-MCNC: 8.7 G/DL (ref 12–17)
MAGNESIUM SERPL-MCNC: 2 MG/DL (ref 1.9–2.7)
MCH RBC QN AUTO: 29.6 PG (ref 26.8–34.3)
MCHC RBC AUTO-ENTMCNC: 30.6 G/DL (ref 31.4–37.4)
MCV RBC AUTO: 97 FL (ref 82–98)
PLATELET # BLD AUTO: 196 THOUSANDS/UL (ref 149–390)
PMV BLD AUTO: 9.6 FL (ref 8.9–12.7)
POTASSIUM SERPL-SCNC: 4.3 MMOL/L (ref 3.5–5.3)
RBC # BLD AUTO: 2.94 MILLION/UL (ref 3.88–5.62)
SODIUM SERPL-SCNC: 131 MMOL/L (ref 135–147)
WBC # BLD AUTO: 4.13 THOUSAND/UL (ref 4.31–10.16)

## 2025-06-06 PROCEDURE — 97530 THERAPEUTIC ACTIVITIES: CPT

## 2025-06-06 PROCEDURE — 85027 COMPLETE CBC AUTOMATED: CPT | Performed by: FAMILY MEDICINE

## 2025-06-06 PROCEDURE — 94760 N-INVAS EAR/PLS OXIMETRY 1: CPT

## 2025-06-06 PROCEDURE — 99232 SBSQ HOSP IP/OBS MODERATE 35: CPT | Performed by: INTERNAL MEDICINE

## 2025-06-06 PROCEDURE — 85730 THROMBOPLASTIN TIME PARTIAL: CPT | Performed by: FAMILY MEDICINE

## 2025-06-06 PROCEDURE — 80048 BASIC METABOLIC PNL TOTAL CA: CPT | Performed by: FAMILY MEDICINE

## 2025-06-06 PROCEDURE — 99232 SBSQ HOSP IP/OBS MODERATE 35: CPT | Performed by: FAMILY MEDICINE

## 2025-06-06 PROCEDURE — 82948 REAGENT STRIP/BLOOD GLUCOSE: CPT

## 2025-06-06 PROCEDURE — 94640 AIRWAY INHALATION TREATMENT: CPT

## 2025-06-06 PROCEDURE — 83735 ASSAY OF MAGNESIUM: CPT | Performed by: FAMILY MEDICINE

## 2025-06-06 PROCEDURE — 97535 SELF CARE MNGMENT TRAINING: CPT

## 2025-06-06 RX ORDER — GABAPENTIN 100 MG/1
100 CAPSULE ORAL ONCE
Status: COMPLETED | OUTPATIENT
Start: 2025-06-06 | End: 2025-06-06

## 2025-06-06 RX ORDER — HEPARIN SODIUM 5000 [USP'U]/ML
5000 INJECTION, SOLUTION INTRAVENOUS; SUBCUTANEOUS EVERY 12 HOURS SCHEDULED
Status: DISCONTINUED | OUTPATIENT
Start: 2025-06-06 | End: 2025-06-13 | Stop reason: HOSPADM

## 2025-06-06 RX ADMIN — FAMOTIDINE 20 MG: 20 TABLET, FILM COATED ORAL at 21:47

## 2025-06-06 RX ADMIN — ATORVASTATIN CALCIUM 80 MG: 80 TABLET, FILM COATED ORAL at 17:20

## 2025-06-06 RX ADMIN — ASPIRIN 81 MG 81 MG: 81 TABLET ORAL at 08:26

## 2025-06-06 RX ADMIN — GABAPENTIN 100 MG: 100 CAPSULE ORAL at 12:19

## 2025-06-06 RX ADMIN — SEVELAMER HYDROCHLORIDE 1600 MG: 800 TABLET, FILM COATED ORAL at 11:51

## 2025-06-06 RX ADMIN — Medication 2.5 MG: at 11:52

## 2025-06-06 RX ADMIN — REMDESIVIR 100 MG: 100 INJECTION, POWDER, LYOPHILIZED, FOR SOLUTION INTRAVENOUS at 10:30

## 2025-06-06 RX ADMIN — DEXAMETHASONE SODIUM PHOSPHATE 6 MG: 10 INJECTION, SOLUTION INTRAMUSCULAR; INTRAVENOUS at 08:28

## 2025-06-06 RX ADMIN — ANTACID TABLETS 500 MG: 500 TABLET, CHEWABLE ORAL at 01:13

## 2025-06-06 RX ADMIN — INSULIN LISPRO 1 UNITS: 100 INJECTION, SOLUTION INTRAVENOUS; SUBCUTANEOUS at 08:28

## 2025-06-06 RX ADMIN — IPRATROPIUM BROMIDE AND ALBUTEROL SULFATE 3 ML: .5; 3 SOLUTION RESPIRATORY (INHALATION) at 13:46

## 2025-06-06 RX ADMIN — METOPROLOL SUCCINATE 25 MG: 25 TABLET, EXTENDED RELEASE ORAL at 08:25

## 2025-06-06 RX ADMIN — INSULIN LISPRO 1 UNITS: 100 INJECTION, SOLUTION INTRAVENOUS; SUBCUTANEOUS at 17:20

## 2025-06-06 RX ADMIN — INSULIN LISPRO 1 UNITS: 100 INJECTION, SOLUTION INTRAVENOUS; SUBCUTANEOUS at 11:53

## 2025-06-06 RX ADMIN — IPRATROPIUM BROMIDE AND ALBUTEROL SULFATE 3 ML: .5; 3 SOLUTION RESPIRATORY (INHALATION) at 20:06

## 2025-06-06 RX ADMIN — SEVELAMER HYDROCHLORIDE 1600 MG: 800 TABLET, FILM COATED ORAL at 08:25

## 2025-06-06 RX ADMIN — SENNOSIDES 8.6 MG: 8.6 TABLET, FILM COATED ORAL at 11:51

## 2025-06-06 RX ADMIN — SEVELAMER HYDROCHLORIDE 1600 MG: 800 TABLET, FILM COATED ORAL at 17:20

## 2025-06-06 RX ADMIN — HEPARIN SODIUM 5000 UNITS: 5000 INJECTION INTRAVENOUS; SUBCUTANEOUS at 21:48

## 2025-06-06 RX ADMIN — IPRATROPIUM BROMIDE AND ALBUTEROL SULFATE 3 ML: .5; 3 SOLUTION RESPIRATORY (INHALATION) at 08:39

## 2025-06-06 RX ADMIN — HYDROMORPHONE HYDROCHLORIDE 0.2 MG: 0.2 INJECTION, SOLUTION INTRAMUSCULAR; INTRAVENOUS; SUBCUTANEOUS at 03:30

## 2025-06-06 RX ADMIN — HYDROMORPHONE HYDROCHLORIDE 0.2 MG: 0.2 INJECTION, SOLUTION INTRAMUSCULAR; INTRAVENOUS; SUBCUTANEOUS at 17:21

## 2025-06-06 RX ADMIN — LIDOCAINE 5% 1 PATCH: 700 PATCH TOPICAL at 08:25

## 2025-06-06 RX ADMIN — Medication 2.5 MG: at 02:23

## 2025-06-06 RX ADMIN — METOPROLOL SUCCINATE 25 MG: 25 TABLET, EXTENDED RELEASE ORAL at 21:47

## 2025-06-06 NOTE — ASSESSMENT & PLAN NOTE
He is not on calcitriol in the outpatient setting.  Home Rx: Sevelamer.   Continue Sevelamer 1600 mg TID and low phos diet.   Phos at goal.

## 2025-06-06 NOTE — ASSESSMENT & PLAN NOTE
high-sensitivity troponins:  1304 (0hr), 5884 (2hr), > 22,973(4hr)  12 lead EKG with ST depression seen in inferior lateral leads of 2 to 3 mmHg  concerning for NSTEMI  6/4/2025 TTE: EF visibly estimated 45-50% with grade 1 diastolic dysfunction, there is moderate calcification of the aortic valve with mild regurgitation, there's mild mitral valve regurgitation. When compared to previous study of 10/9/2024 EF has decreased from 60% to 45 to 50%  increase Toprol-XL to 25 mg BID  continue aspirin 81 mg once a day  continue IV heparin ACS protocol, 48 hours will complete on 6/6/2025 at 6 AM  6/5/2025 patient at this time refusing cardiac catheterization, he states he does not want any further testing.  6/6/2025 patient still refuses any cardiac intervention. Will optimize medication and hopefully he will be compliant

## 2025-06-06 NOTE — PROGRESS NOTES
NEPHROLOGY HOSPITAL PROGRESS NOTE   Naresh Carpio 64 y.o. male MRN: [id number]  Unit/Bed#: [address] [place] [address] Encounter: [id number]  Reason for Consult: ESRD on HD    Assessment & Plan  ESRD (end stage renal disease) ([facility])  [facility] TTS  Access: R RADHA chacon  EDW 94.7 kg.   Had urgent HD treatment on Wednesday, 6/4/25, due to hyperkalemia and fluid overload.  Completed HD yesterday.   Electrolytes are stable.   Next HD is tomorrow.     Acute respiratory failure with hypoxia ([facility])  Felt to be due to fluid overload + infectious etiology.   COVID positive on admission.   Of note, troponin levels elevated and cards recommended cardiac cath which patient is refusing.     Elevated troponin  Cardiology consulted.  Patient is refusing cardiac catheterization.      Essential hypertension  BP is on the high side.   Home Rx: Nifedipine 30 mg daily, furosemide 20 mg twice a day.  Current Rx: Metoprolol succ 25 mg BID.   Metoprolol added by cards due to NSTEMI.     Volume overload secondary to noncompliance with hemodialysis  10/9/24 Echo EF 60-65%, G1DD  Currently compensated.    Anemia in ESRD (end-stage renal disease)  ([facility])  Hgb is below goal at 8.7  He is on Mircera 150 mcg every 2 weeks and Venofer.     Chronic kidney disease-mineral and bone disorder  He is not on calcitriol in the outpatient setting.  Home Rx: Sevelamer.   Continue Sevelamer 1600 mg TID and low phos diet.   Phos at goal.     Bilateral leg edema  This is chronic and stable.     COVID-19  On remdesivir and dexamethasone per primary service.    TODAY's DISCUSSION / PLAN:  Next regular HD is tomorrow.    SUBJECTIVE / 24H INTERVAL HISTORY:  No new acute events.   No CP or SOB.   Refused cardiac cath.     OBJECTIVE:  Current Weight: Weight - Scale: 90.7 kg (200 lb)  Vitals:    06/05/25 2100 06/06/25 0228 06/06/25 0228 06/06/25 0758   BP: 153/61 136/56 136/56 146/62   BP Location:       Pulse: 63 55 55 58   Resp:  20  18   Temp:  98.5 °F (36.9 °C) 98.5  "°F (36.9 °C) 98.1 °F (36.7 °C)   TempSrc:  Oral Oral    SpO2: 97% 92% 95% 98%   Weight:       Height:           Intake/Output Summary (Last 24 hours) at 6/6/2025 1101  Last data filed at 6/6/2025 1001  Gross per 24 hour   Intake 1425.42 ml   Output 3600 ml   Net -2174.58 ml     General: conscious, cooperative, no distress  Skin: dry  Eyes: pink conjunctivae  ENT: moist mucous membranes  Respiratory: equal chest expansion, clear breath sounds.  Cardiovascular: distinct heart sounds, normal rate, regular rhythm, no rub  Abdomen: soft, non tender, non distended, normal bowel sounds  Extremities: + bilateral LE edema. L>R  Genitourinary: no gaitan catheter.   Neuro: awake, alert.   Psych: appropriate affect    Medications:  Current Medications[1]    Laboratory Results:  Results from last 7 days   Lab Units 06/06/25  0518 06/05/25  0343 06/04/25  0359   WBC Thousand/uL 4.13* 3.60* 8.53   HEMOGLOBIN g/dL 8.7* 8.8* 9.6*   HEMATOCRIT % 28.4* 28.6* 31.8*   PLATELETS Thousands/uL 196 184 218   POTASSIUM mmol/L 4.3 4.7 5.4*   CHLORIDE mmol/L 95* 94* 91*   CO2 mmol/L 27 30 27   BUN mg/dL 34* 26* 40*   CREATININE mg/dL 4.11* 4.89* 7.00*   CALCIUM mg/dL 8.4 8.3* 9.0   MAGNESIUM mg/dL 2.0 2.0  --    PHOSPHORUS mg/dL  --  4.2*  --      Portions of the record may have been created with voice recognition software. Occasional wrong word or \"sound a like\" substitutions may have occurred due to the inherent limitations of voice recognition software. Read the chart carefully and recognize, using context, where substitutions have occurred.If you have any questions, please contact the dictating provider.         [1]   Current Facility-Administered Medications:     acetaminophen (TYLENOL) tablet 650 mg, 650 mg, Oral, Q6H PRN, Lore Revankar, DO    allopurinol (ZYLOPRIM) tablet 100 mg, 100 mg, Oral, Once per day on Tuesday Thursday Saturday, Lore Revankar, DO, 100 mg at 06/05/25 1517    aspirin chewable tablet 81 mg, 81 mg, Oral, Daily, " Lore Silver DO, 81 mg at 06/06/25 0826    atorvastatin (LIPITOR) tablet 80 mg, 80 mg, Oral, Daily With Dinner, Lore Silver DO, 80 mg at 06/05/25 1641    calcium carbonate (TUMS) chewable tablet 500 mg, 500 mg, Oral, BID PRN, GRANT Rich, 500 mg at 06/06/25 0113    dexamethasone (PF) (DECADRON) injection 6 mg, 6 mg, Intravenous, Q24H, Milan Ruiz MD, 6 mg at 06/06/25 0828    famotidine (PEPCID) tablet 20 mg, 20 mg, Oral, HS, GRANT Rich, 20 mg at 06/05/25 2339    [Held by provider] gabapentin (NEURONTIN) capsule 100 mg, 100 mg, Oral, Once per day on Monday Wednesday Friday, Lore Silver DO    HYDROmorphone HCl (DILAUDID) injection 0.2 mg, 0.2 mg, Intravenous, Q4H PRN, GRANT Rich, 0.2 mg at 06/06/25 0330    insulin lispro (HumALOG/ADMELOG) 100 units/mL subcutaneous injection 1-5 Units, 1-5 Units, Subcutaneous, TID AC, 1 Units at 06/06/25 0828 **AND** Fingerstick Glucose (POCT), , , TID AC, Lore Silver DO    ipratropium-albuterol (DUO-NEB) 0.5-2.5 mg/3 mL inhalation solution 3 mL, 3 mL, Nebulization, TID, Milan Ruiz MD, 3 mL at 06/06/25 0839    lidocaine (LIDODERM) 5 % patch 1 patch, 1 patch, Topical, Daily, Lore Silver DO, 1 patch at 06/06/25 0825    metoprolol succinate (TOPROL-XL) 24 hr tablet 25 mg, 25 mg, Oral, Q12H KEMAL, RAMEZ ShellNP, 25 mg at 06/06/25 0825    oxyCODONE (ROXICODONE) split tablet 2.5 mg, 2.5 mg, Oral, Q8H PRN, GRANT Rich, 2.5 mg at 06/06/25 0223    [COMPLETED] remdesivir (Veklury) 200 mg in sodium chloride 0.9 % 290 mL IVPB, 200 mg, Intravenous, Q24H, Stopped at 06/05/25 0645 **FOLLOWED BY** remdesivir (Veklury) 100 mg in sodium chloride 0.9 % 270 mL IVPB, 100 mg, Intravenous, Q24H, Milan Ruiz MD, 100 mg at 06/06/25 1030    senna (SENOKOT) tablet 8.6 mg, 8.6 mg, Oral, Daily With Lunch, Lore Silver DO, 8.6 mg at 06/05/25 1258    sevelamer (RENAGEL) tablet 1,600 mg, 1,600 mg, Oral, TID With Meals,  Lore Silver DO, 1,600 mg at 06/06/25 0880

## 2025-06-06 NOTE — PROGRESS NOTES
Progress Note - Cardiology   Name: Naresh Carpio 64 y.o. male I MRN: [id number]  Unit/Bed#: [address] [place] [address] Date of Admission: 6/4/2025   Date of Service: 6/6/2025 I Hospital Day: 1    Assessment & Plan  Acute respiratory failure with hypoxia (HCC)  in the setting of noncompliance with hemodialysis  admission chest x-ray with pulmonary edema  patient tested positive for Covid  started on Remdesivir  Elevated troponin  high-sensitivity troponins:  1304 (0hr), 5884 (2hr), > 22,973(4hr)  12 lead EKG with ST depression seen in inferior lateral leads of 2 to 3 mmHg  concerning for NSTEMI  6/4/2025 TTE: EF visibly estimated 45-50% with grade 1 diastolic dysfunction, there is moderate calcification of the aortic valve with mild regurgitation, there's mild mitral valve regurgitation. When compared to previous study of 10/9/2024 EF has decreased from 60% to 45 to 50%  increase Toprol-XL to 25 mg BID  continue aspirin 81 mg once a day  continue IV heparin ACS protocol, 48 hours will complete on 6/6/2025 at 6 AM  6/5/2025 patient at this time refusing cardiac catheterization, he states he does not want any further testing.  6/6/2025 patient still refuses any cardiac intervention. Will optimize medication and hopefully he will be compliant  Volume overload secondary to noncompliance with hemodialysis  Wt Readings from Last 3 Encounters:   06/04/25 90.7 kg (200 lb)   05/13/25 92.6 kg (204 lb 3.2 oz)   05/05/25 96.6 kg (213 lb)   BNP 2283  volume overload secondary to noncompliance with hemodialysis missing treatment and cutting treatment short.  Receiving extra hemodialysis treatment today and will resume Tuesday/Thursday/Saturday schedule  patient states breathing has improved since he is receiving his hemodialysis  will increase Toprol-XL to 25 mg BID  ESRD (end stage renal disease) (HCC)  Lab Results   Component Value Date    EGFR 14 06/06/2025    EGFR 11 06/05/2025    EGFR 7 06/04/2025    CREATININE 4.11 (H) 06/06/2025     CREATININE 4.89 (H) 06/05/2025    CREATININE 7.00 (H) 06/04/2025   followed by nephrology   on a Tuesday, Thursday, Saturday schedule  receiving extra dose of hemodialysis today  Essential hypertension  blood pressures currently stable  continue to monitor  discontinue Procardia xl 30 mg daily due to decrease in EF  will increase Toprol-XL to 25 mg BID  Diabetes mellitus type 2 with peripheral artery disease (HCC)  Lab Results   Component Value Date    HGBA1C 4.8 06/04/2025       Recent Labs     06/05/25  1638 06/05/25 2022 06/06/25  0757 06/06/25  1051   POCGLU 151* 195* 171* 173*       Blood Sugar Average: Last 72 hrs:  (P) 131.2  managed for primary team  Paroxysmal atrial fibrillation (HCC)  first diagnosed it in 2023  patient states he is not had any further episodes of atrial fibrillation  patient previously was on Coreg but self stopped,  will increase Toprol-XL 25 mg BID  Bilateral leg edema  appears to be chronic and venous in origin  6//2025 bilateral lower extremity duplex notes no acute or chronic DVT bilaterally, no evidence of superficial thrombophlebitis  COVID-19      Subjective   Chief Complaint: Patient continues to refuse cardiac catheterization. He states he does not want any further testing. He is aware of his risk for sudden cardiac death and further MI      Objective :  Temp:  [97.2 °F (36.2 °C)-98.6 °F (37 °C)] 98.1 °F (36.7 °C)  HR:  [55-75] 58  BP: ()/(44-62) 146/62  Resp:  [14-20] 18  SpO2:  [91 %-99 %] 98 %  O2 Device: None (Room air)  Nasal Cannula O2 Flow Rate (L/min):  [2 L/min] 2 L/min  Orthostatic Blood Pressures      Flowsheet Row Most Recent Value   Blood Pressure 146/62 filed at 06/06/2025 0758   Patient Position - Orthostatic VS Lying filed at 06/05/2025 1220          First Weight: Weight - Scale: 90.8 kg (200 lb 2.8 oz) (06/04/25 0609)  Vitals:    06/04/25 0609 06/04/25 1415   Weight: 90.8 kg (200 lb 2.8 oz) 90.7 kg (200 lb)     Physical Exam  Vitals and nursing note  reviewed.   Constitutional:       Appearance: Normal appearance. He is normal weight. He is ill-appearing (Chronically).   HENT:      Right Ear: External ear normal.      Left Ear: External ear normal.     Eyes:      General:         Right eye: No discharge.         Left eye: No discharge.       Cardiovascular:      Rate and Rhythm: Normal rate and regular rhythm.      Pulses: Normal pulses.   Pulmonary:      Effort: Pulmonary effort is normal. No accessory muscle usage or respiratory distress.      Breath sounds: Examination of the right-lower field reveals decreased breath sounds. Examination of the left-lower field reveals decreased breath sounds. Decreased breath sounds present.   Abdominal:      General: There is no distension.      Palpations: Abdomen is soft.     Musculoskeletal:      Right lower leg: Edema present.      Left lower leg: Edema present.      Comments: Chronic venous stasis     Skin:     General: Skin is warm and dry.      Capillary Refill: Capillary refill takes less than 2 seconds.     Neurological:      Mental Status: He is alert and oriented to person, place, and time. Mental status is at baseline.     Psychiatric:         Mood and Affect: Mood normal.           Lab Results: I have reviewed the following results:  Results from last 7 days   Lab Units 06/06/25 0518 06/05/25 0343 06/04/25  0359   WBC Thousand/uL 4.13* 3.60* 8.53   HEMOGLOBIN g/dL 8.7* 8.8* 9.6*   HEMATOCRIT % 28.4* 28.6* 31.8*   PLATELETS Thousands/uL 196 184 218     Results from last 7 days   Lab Units 06/06/25 0518 06/05/25  0343 06/04/25  0359   POTASSIUM mmol/L 4.3 4.7 5.4*   CHLORIDE mmol/L 95* 94* 91*   CO2 mmol/L 27 30 27   BUN mg/dL 34* 26* 40*   CREATININE mg/dL 4.11* 4.89* 7.00*   CALCIUM mg/dL 8.4 8.3* 9.0     Results from last 7 days   Lab Units 06/06/25  0518 06/05/25  0955 06/05/25  0343 06/04/25  1229 06/04/25  0538   INR   --   --   --   --  1.12   PTT seconds 79* 87* 62*   < > 27    < > = values in this  interval not displayed.     Lab Results   Component Value Date    HGBA1C 4.8 06/04/2025     Lab Results   Component Value Date    CKTOTAL 43 02/08/2025       VTE Pharmacologic Prophylaxis: VTE covered by:    None     VTE Mechanical Prophylaxis: sequential compression device    Lynne BURNS  Cardiology

## 2025-06-06 NOTE — OCCUPATIONAL THERAPY NOTE
"  OT TREATMENT         06/06/25 1515   OT Last Visit   OT Visit Date 06/06/25   Note Type   Note Type Treatment   Pain Assessment   Pain Assessment Tool 0-10   Pain Score No Pain   Restrictions/Precautions   Braces or Orthoses LSO  (to be worn when HOB >45 degrees or when OOB)   Other Precautions Chair Alarm;Bed Alarm;Pain;Fall Risk   ADL   UB Dressing Comments mod A to adjust LSO   Toileting Assistance  5  Supervision/Setup   Toileting Deficit Setup;Supervison/safety;Steadying   Toileting Comments standing to urinate at toilet   Bed Mobility   Supine to Sit Unable to assess   Sit to Supine Unable to assess   Additional Comments pt sitting OOB in chair upon OT arrival and returned to chair at end of session   Transfers   Sit to Stand 4  Minimal assistance  (progressing to supervision)   Stand to Sit 4  Minimal assistance  (progressing to supervision)   Toilet transfer 4  Minimal assistance   Functional Mobility   Functional Mobility 4  Minimal assistance  (progressing towards supervision)   Additional Comments household distances multiple trials within room   Additional items Rolling walker   Subjective   Subjective \"This eye surgery is my lifeline.\"   Cognition   Overall Cognitive Status WFL   Arousal/Participation Alert;Cooperative   Attention Within functional limits   Orientation Level Oriented X4   Memory Within functional limits   Following Commands Follows all commands and directions without difficulty   Activity Tolerance   Activity Tolerance Patient tolerated treatment well;Patient limited by fatigue   Assessment   Assessment Pt seen for OT treatment session. Pt making steady progress towards IP OT goals. Pt progressing to supervision for mobility and transfers using RW with intermittent cueing for hand placement and safe technique. Pt limited by fatigue, overall weakness and deconditioning impacting fxl performance. Pt would benefit from cont OT services to maximize safety and fxl performance of ADLs. " Recommend level II moderate resource intensity when medically cleared for d/c. The patient's raw score on the AM-PAC Daily Activity Inpatient Short Form is 16. A raw score of less than 19 suggests the patient may benefit from discharge to post-acute rehabilitation services. Please refer to the recommendation of the Occupational Therapist for safe discharge planning.   Plan   Treatment Interventions ADL retraining;Functional transfer training;UE strengthening/ROM;Endurance training;Activityengagement;Patient/family training;Equipment evaluation/education;Compensatory technique education;Energy conservation   OT Frequency 3-5x/wk   Discharge Recommendation   Rehab Resource Intensity Level, OT II (Moderate Resource Intensity)   AM-PAC Daily Activity Inpatient   Lower Body Dressing 2   Bathing 2   Toileting 3   Upper Body Dressing 3   Grooming 3   Eating 3   Daily Activity Raw Score 16   Daily Activity Standardized Score (Calc for Raw Score >=11) 35.96   -PAC Applied Cognition Inpatient   Following a Speech/Presentation 4   Understanding Ordinary Conversation 4   Taking Medications 4   Remembering Where Things Are Placed or Put Away 4   Remembering List of 4-5 Errands 4   Taking Care of Complicated Tasks 4   Applied Cognition Raw Score 24   Applied Cognition Standardized Score 62.21       Niharika Rizzo OTR/L   [place] License # [id number]  [place] License # [id number]

## 2025-06-06 NOTE — ASSESSMENT & PLAN NOTE
[facility] TTS  Access: R IJ permcath  EDW 94.7 kg.   Had urgent HD treatment on Wednesday, 6/4/25, due to hyperkalemia and fluid overload.  Completed HD yesterday.   Electrolytes are stable.   Next HD is tomorrow.

## 2025-06-06 NOTE — ASSESSMENT & PLAN NOTE
Lab Results   Component Value Date    HGBA1C 4.8 06/04/2025       Recent Labs     06/05/25  1638 06/05/25 2022 06/06/25  0757 06/06/25  1051   POCGLU 151* 195* 171* 173*       (P) 131.2Place patient on Accu-Cheks with SSI. A1c 4.8.

## 2025-06-06 NOTE — ASSESSMENT & PLAN NOTE
Felt to be due to fluid overload + infectious etiology.   COVID positive on admission.   Of note, troponin levels elevated and cards recommended cardiac cath which patient is refusing.

## 2025-06-06 NOTE — ASSESSMENT & PLAN NOTE
Lab Results   Component Value Date    HGBA1C 4.8 06/04/2025       Recent Labs     06/05/25  1638 06/05/25 2022 06/06/25  0757 06/06/25  1051   POCGLU 151* 195* 171* 173*       Blood Sugar Average: Last 72 hrs:  (P) 131.2  managed for primary team

## 2025-06-06 NOTE — ASSESSMENT & PLAN NOTE
Lab Results   Component Value Date    EGFR 14 06/06/2025    EGFR 11 06/05/2025    EGFR 7 06/04/2025    CREATININE 4.11 (H) 06/06/2025    CREATININE 4.89 (H) 06/05/2025    CREATININE 7.00 (H) 06/04/2025     On HD TTS  Only completed 1 hour of dialysis on Tuesday per report given to ER  Continue hemodialysis per recommendations from nephrology

## 2025-06-06 NOTE — PHYSICAL THERAPY NOTE
"   PT TREATMENT     06/06/25 9155   PT Last Visit   PT Visit Date 06/06/25   Note Type   Note Type Treatment   Pain Assessment   Pain Assessment Tool 0-10   Pain Score No Pain   Restrictions/Precautions   Braces or Orthoses LSO  (to be worn when HOB >45 degrees or when OOB.)   Other Precautions Pain;Fall Risk;Chair Alarm;Bed Alarm, airborne/contact, HD, foot wounds   General   Chart Reviewed Yes   Cognition   Overall Cognitive Status WFL   Arousal/Participation Alert   Subjective   Subjective \"I am so tired, I hardly slept last night\"   Bed Mobility   Additional Comments mod assist to don LSO   Transfers   Sit to Stand 4  Minimal assistance   Stand to Sit 4  Minimal assistance   Stand pivot 4  Minimal assistance   Additional items   (with walker)   Ambulation/Elevation   Gait pattern   (flexed posture despite LSO)   Gait Assistance 4  Minimal assist   Assistive Device Rolling walker   Distance 2x50 feet   Balance   Static Sitting Fair +   Static Standing Fair +   Ambulatory Fair   Activity Tolerance   Activity Tolerance Patient limited by fatigue;Patient tolerated treatment well   Exercises    standing static balance with head turns L and R and UE flexion and abduction x 5 each.   Assessment   Prognosis Good   Problem List Decreased strength;Decreased range of motion;Impaired balance;Decreased mobility;Pain;Orthopedic restrictions;Decreased skin integrity   Assessment Pt demonstrates improving activity tolerance overall as pt was able to ambulate 2x 50 feet with  a walker in his room (due to airborne precautions) with a generally steady gait. Pt does need moderate verbal cues to maintain upright posture as pt tends to slouch.  Sp02 maintained >93% with activity on RA. Pt did not have his LSO donned upon this PT's arrival to pt's room, pt educated in the importance of the LSO for fx healing.    The patient's AM-PAC Basic Mobility Inpatient Short Form Raw Score is 18. A Raw score of greater than 16 suggests the " patient may benefit from discharge to home, however pt may require post acute rehab due to other personal and social issues. Please also refer to the recommendation of the Physical Therapist for safe discharge planning.         Plan   Treatment/Interventions LE strengthening/ROM;ADL retraining;Gait training;Bed mobility;Equipment eval/education;Patient/family training;Therapeutic exercise;Elevations;Functional transfer training   Progress Progressing toward goals   PT Frequency 3-5x/wk   Discharge Recommendation   Rehab Resource Intensity Level, PT II (Moderate Resource Intensity)   AM-PAC Basic Mobility Inpatient   Turning in Flat Bed Without Bedrails 3   Lying on Back to Sitting on Edge of Flat Bed Without Bedrails 3   Moving Bed to Chair 3   Standing Up From Chair Using Arms 3   Walk in Room 3   Climb 3-5 Stairs With Railing 3   Basic Mobility Inpatient Raw Score 18   Basic Mobility Standardized Score 41.05   [facility] Highest Level Of Mobility   -HLM Goal 6: Walk 10 steps or more   [facility]-HLM Achieved 7: Walk 25 feet or more   End of Consult   Patient Position at End of Consult All needs within reach;Bed/Chair alarm activated;Bedside chair   Licensure   [place] License Number  Niharika Cabrales PT [id number]

## 2025-06-06 NOTE — PLAN OF CARE
Problem: METABOLIC, FLUID AND ELECTROLYTES - ADULT  Goal: Electrolytes maintained within normal limits  Description: INTERVENTIONS:  - Monitor labs and assess patient for signs and symptoms of electrolyte imbalances  - Administer electrolyte replacement as ordered  - Monitor response to electrolyte replacements, including repeat lab results as appropriate  - Instruct patient on fluid and nutrition as appropriate  Outcome: Progressing  Goal: Fluid balance maintained  Description: INTERVENTIONS:  - Monitor labs   - Monitor I/O and WT  - Instruct patient on fluid and nutrition as appropriate  - Assess for signs & symptoms of volume excess or deficit  Outcome: Progressing     Problem: RESPIRATORY - ADULT  Goal: Achieves optimal ventilation and oxygenation  Description: INTERVENTIONS:  - Assess for changes in respiratory status  - Assess for changes in mentation and behavior  - Position to facilitate oxygenation and minimize respiratory effort  - Oxygen administered by appropriate delivery if ordered  - Initiate smoking cessation education as indicated  - Encourage broncho-pulmonary hygiene including cough, deep breathe, Incentive Spirometry  - Assess the need for suctioning and aspirate as needed  - Assess and instruct to report SOB or any respiratory difficulty  - Respiratory Therapy support as indicated  Outcome: Progressing     Problem: Prexisting or High Potential for Compromised Skin Integrity  Goal: Skin integrity is maintained or improved  Description: INTERVENTIONS:  - Identify patients at risk for skin breakdown  - Assess and monitor skin integrity including under and around medical devices   - Assess and monitor nutrition and hydration status  - Monitor labs  - Assess for incontinence   - Turn and reposition patient  - Assist with mobility/ambulation  - Relieve pressure over chente prominences   - Avoid friction and shearing  - Provide appropriate hygiene as needed including keeping skin clean and dry  -  Evaluate need for skin moisturizer/barrier cream  - Collaborate with interdisciplinary team  - Patient/family teaching  - Consider wound care consult    Assess:  - Review Praful scale daily  - Clean and moisturize skin every shift   - Inspect skin when repositioning, toileting, and assisting with ADLS  - Assess under medical devices such as masimo  every shift   - Assess extremities for adequate circulation and sensation     Bed Management:  - Have minimal linens on bed & keep smooth, unwrinkled  - Change linens as needed when moist or perspiring  - Avoid sitting or lying in one position for more than 2 hours while in bed?Keep HOB at 30 degrees   - Toileting:  - Offer bedside commode    Activity:  - Mobilize patient 2 times a day  - Encourage activity and walks on unit  - Encourage or provide ROM exercises   - Turn and reposition patient every 2 Hours  - Use appropriate equipment to lift or move patient in bed  - Instruct/ Assist with weight shifting every 2 hours  when out of bed in chair  - Consider limitation of chair time 2 hour intervals    Skin Care:  - Avoid use of baby powder, tape, friction and shearing, hot water or constrictive clothing  - Relieve pressure over bony prominences using cushion when out of bed   - Do not massage red bony areas    Next Steps:  - Teach patient strategies to minimize risks such as shifting weight   - Consider consults to  interdisciplinary teams such as ot/ py   Outcome: Progressing     Problem: PAIN - ADULT  Goal: Verbalizes/displays adequate comfort level or baseline comfort level  Description: Interventions:  - Encourage patient to monitor pain and request assistance  - Assess pain using appropriate pain scale  - Administer analgesics as ordered based on type and severity of pain and evaluate response  - Implement non-pharmacological measures as appropriate and evaluate response  - Consider cultural and social influences on pain and pain management  - Notify  physician/advanced practitioner if interventions unsuccessful or patient reports new pain  - Educate patient/family on pain management process including their role and importance of  reporting pain   - Provide non-pharmacologic/complimentary pain relief interventions  Outcome: Progressing     Problem: INFECTION - ADULT  Goal: Absence or prevention of progression during hospitalization  Description: INTERVENTIONS:  - Assess and monitor for signs and symptoms of infection  - Monitor lab/diagnostic results  - Monitor all insertion sites, i.e. indwelling lines, tubes, and drains  - Monitor endotracheal if appropriate and nasal secretions for changes in amount and color  - [place] appropriate cooling/warming therapies per order  - Administer medications as ordered  - Instruct and encourage patient and family to use good hand hygiene technique  - Identify and instruct in appropriate isolation precautions for identified infection/condition  Outcome: Progressing     Problem: SAFETY ADULT  Goal: Patient will remain free of falls  Description: INTERVENTIONS:  - Educate patient/family on patient safety including physical limitations  - Instruct patient to call for assistance with activity   - Consider consulting OT/PT to assist with strengthening/mobility based on AM PAC & JH-HLM score  - Consult OT/PT to assist with strengthening/mobility   - Keep Call bell within reach  - Keep bed low and locked with side rails adjusted as appropriate  - Keep care items and personal belongings within reach  - Initiate and maintain comfort rounds  - Make Fall Risk Sign visible to staff  - Offer Toileting every 3 Hours, in advance of need  - Initiate/Maintain bed alarm alarm  - Obtain necessary fall risk management equipment: call bell within reach   - Apply yellow socks and bracelet for high fall risk patients  - Consider moving patient to room near nurses station  Outcome: Progressing  Goal: Maintain or return to baseline ADL  function  Description: INTERVENTIONS:  -  Assess patient's ability to carry out ADLs; assess patient's baseline for ADL function and identify physical deficits which impact ability to perform ADLs (bathing, care of mouth/teeth, toileting, grooming, dressing, etc.)  - Assess/evaluate cause of self-care deficits   - Assess range of motion  - Assess patient's mobility; develop plan if impaired  - Assess patient's need for assistive devices and provide as appropriate  - Encourage maximum independence but intervene and supervise when necessary  - Involve family in performance of ADLs  - Assess for home care needs following discharge   - Consider OT consult to assist with ADL evaluation and planning for discharge  - Provide patient education as appropriate  - Monitor functional capacity and physical performance, use of AM PAC & JH-HLM   - Monitor gait, balance and fatigue with ambulation    Outcome: Progressing  Goal: Maintains/Returns to pre admission functional level  Description: INTERVENTIONS:  - Perform AM-PAC 6 Click Basic Mobility/ Daily Activity assessment daily.  - Set and communicate daily mobility goal to care team and patient/family/caregiver.   - Collaborate with rehabilitation services on mobility goals if consulted  - Perform Range of Motion 2 times a day.  - Reposition patient every 2 hours.  - Dangle patient 2 times a day  - Stand patient 2 times a day  - Ambulate patient 2 times a day  - Out of bed to chair 2 times a day   - Out of bed for meals 2 times a day  - Out of bed for toileting  - Record patient progress and toleration of activity level   Outcome: Progressing     Problem: DISCHARGE PLANNING  Goal: Discharge to home or other facility with appropriate resources  Description: INTERVENTIONS:  - Identify barriers to discharge w/patient and caregiver  - Arrange for needed discharge resources and transportation as appropriate  - Identify discharge learning needs (meds, wound care, etc.)  - Arrange for  interpretive services to assist at discharge as needed  - Refer to Case Management Department for coordinating discharge planning if the patient needs post-hospital services based on physician/advanced practitioner order or complex needs related to functional status, cognitive ability, or social support system  Outcome: Progressing     Problem: Knowledge Deficit  Goal: Patient/family/caregiver demonstrates understanding of disease process, treatment plan, medications, and discharge instructions  Description: Complete learning assessment and assess knowledge base.  Interventions:  - Provide teaching at level of understanding  - Provide teaching via preferred learning methods  Outcome: Progressing     Problem: Nutrition/Hydration-ADULT  Goal: Nutrient/Hydration intake appropriate for improving, restoring or maintaining nutritional needs  Description: Monitor and assess patient's nutrition/hydration status for malnutrition. Collaborate with interdisciplinary team and initiate plan and interventions as ordered.  Monitor patient's weight and dietary intake as ordered or per policy. Utilize nutrition screening tool and intervene as necessary. Determine patient's food preferences and provide high-protein, high-caloric foods as appropriate.     INTERVENTIONS:  - Monitor oral intake, urinary output, labs, and treatment plans  - Assess nutrition and hydration status and recommend course of action  - Evaluate amount of meals eaten  - Assist patient with eating if necessary   - Allow adequate time for meals  - Recommend/ encourage appropriate diets, oral nutritional supplements, and vitamin/mineral supplements  - Order, calculate, and assess calorie counts as needed  - Recommend, monitor, and adjust tube feedings and TPN/PPN based on assessed needs  - Assess need for intravenous fluids  - Provide specific nutrition/hydration education as appropriate  - Include patient/family/caregiver in decisions related to nutrition  Outcome:  Progressing

## 2025-06-06 NOTE — ASSESSMENT & PLAN NOTE
BP is on the high side.   Home Rx: Nifedipine 30 mg daily, furosemide 20 mg twice a day.  Current Rx: Metoprolol succ 25 mg BID.   Metoprolol added by cards due to NSTEMI.

## 2025-06-06 NOTE — PROGRESS NOTES
Progress Note - Hospitalist   Name: Naresh Carpio 64 y.o. male I MRN: [id number]  Unit/Bed#: [address] [place] [address] I Date of Admission: 6/4/2025   Date of Service: 6/6/2025 I Hospital Day: 1     Assessment & Plan  Acute respiratory failure with hypoxia (HCC)  Patient presents from rehab facility due to shortness of breath.  He was noted to be hypoxic to 84% on room air and in respiratory distress by medics  Initially required BiPAP in the ER and currently on 4 L  VBG showed pH of 7.3 with PCO2 of 52 and PO2 of 30  Likely in the setting of volume overload due to noncompliance with dialysis sessions  Chest x-ray appears to be pulmonary edema however official read is pending  Further management with dialysis.  Titrate off oxygen as tolerated  COVID pending    6/6 - Secondary to volume overload from missed hemodialysis.  Continue hemodialysis per recommendations from nephrology.  Patient also found to be positive for COVID-19 which may also be contributing. Overall, respiratory status is improved.  Elevated troponin  Patient with significant troponin elevation greater than 22,973, now trending down.  No chest pain.  Concern for NSTEMI.  Initially treated with IV heparin now discontinued per recommendations from cardiology.  Patient is adamantly refusing cardiac catheterization as recommended by cardiology.  ESRD (end stage renal disease) ([facility])  Lab Results   Component Value Date    EGFR 14 06/06/2025    EGFR 11 06/05/2025    EGFR 7 06/04/2025    CREATININE 4.11 (H) 06/06/2025    CREATININE 4.89 (H) 06/05/2025    CREATININE 7.00 (H) 06/04/2025     On HD TTS  Only completed 1 hour of dialysis on Tuesday per report given to ER  Continue hemodialysis per recommendations from nephrology  Bilateral leg edema  Bilateral leg edema - L > R, no longer on anticoagulation  Bilateral lower extremity venous duplex negative for DVT.  Essential hypertension  Continue Procardia XL and monitor blood pressures  SIRS (systemic inflammatory  response syndrome) ([facility])  As noted by tachycardia, tachypnea due to volume overload.  Lactic acid within normal limits.  No obvious signs of infection, hold off on antibiotics  Blood cultures drawn in the ER  Diabetes mellitus type 2 with peripheral artery disease ([facility])  Lab Results   Component Value Date    HGBA1C 4.8 06/04/2025       Recent Labs     06/05/25  1638 06/05/25  2022 06/06/25  0757 06/06/25  1051   POCGLU 151* 195* 171* 173*       (P) 131.2Place patient on Accu-Cheks with SSI. A1c 4.8.    Paroxysmal atrial fibrillation (HCC)  Continue Procardia XL.  Per recent discharge summary, patient was refusing Eliquis due to his recent history of retroperitoneal hematoma in April 2025  Volume overload secondary to noncompliance with hemodialysis  Wt Readings from Last 3 Encounters:   06/04/25 90.7 kg (200 lb)   05/13/25 92.6 kg (204 lb 3.2 oz)   05/05/25 96.6 kg (213 lb)     Patient clinically appears volume overloaded from not completing dialysis session  Received 80 mg of IV Lasix in the ER  Hemodialysis per nephrology  Anemia in ESRD (end-stage renal disease)  ([facility])  Hemoglobin 8.7. Will continue to monitor.  L2 vertebral fracture ([facility])  Per prior note, patient to utilize LSO brace  PT/OT while here  Chronic kidney disease-mineral and bone disorder  Lab Results   Component Value Date    EGFR 14 06/06/2025    EGFR 11 06/05/2025    EGFR 7 06/04/2025    CREATININE 4.11 (H) 06/06/2025    CREATININE 4.89 (H) 06/05/2025    CREATININE 7.00 (H) 06/04/2025     Proliferative diabetic retinopathy of both eyes with macular edema associated with type 2 diabetes mellitus ([facility])    Patient has been following up with ophthalmology in the outpatient setting.  Patient underwent intravitreal Avastin treatment with Dr. Zapata on May 30th. Patient also sees Dr. Chaudhry. Spoke with Dr. Chaudhry who advised that patient should follow up with  upon discharge.  COVID-19  Continue remdesivir and dexamethasone.  VTE  Pharmacologic Prophylaxis:   Heparin drip    Mobility:   Basic Mobility Inpatient Raw Score: 18  JH-HLM Goal: 6: Walk 10 steps or more  JH-HLM Achieved: 7: Walk 25 feet or more    Patient Centered Rounds: I performed bedside rounds with nursing staff today.     Current Length of Stay: 1 day(s)  Current Patient Status: Inpatient   Certification Statement: The patient will continue to require additional inpatient hospital stay due to above.  Discharge Plan: TBD    Code Status: Level 1 - Full Code    Subjective   Patient was seen and examined at bedside. No acute events overnight. No chest pain. Shortness of breath resolved. No other complaints at this time.    Objective :  Temp:  [97.9 °F (36.6 °C)-98.6 °F (37 °C)] 98.1 °F (36.7 °C)  HR:  [55-69] 58  BP: (130-153)/(44-62) 146/62  Resp:  [18-20] 18  SpO2:  [92 %-99 %] 98 %  O2 Device: None (Room air)    Body mass index is 26.39 kg/m².     Input and Output Summary (last 24 hours):     Intake/Output Summary (Last 24 hours) at 6/6/2025 1523  Last data filed at 6/6/2025 1001  Gross per 24 hour   Intake 1125.42 ml   Output 100 ml   Net 1025.42 ml     Physical Exam  HENT:      Head: Normocephalic.      Mouth/Throat:      Mouth: Mucous membranes are moist.     Eyes:      Extraocular Movements: Extraocular movements intact.       Cardiovascular:      Rate and Rhythm: Normal rate.   Pulmonary:      Effort: Pulmonary effort is normal. No respiratory distress.      Comments: Decreased in the bases  Abdominal:      Palpations: Abdomen is soft.      Tenderness: There is no abdominal tenderness.     Musculoskeletal:      Comments: Bilateral lower extremity edema     Skin:     General: Skin is warm.     Neurological:      Mental Status: He is alert and oriented to person, place, and time.     Psychiatric:         Mood and Affect: Mood normal.       Lines/Drains:  Lines/Drains/Airways       Active Status       Name Placement date Placement time Site Days    HD Permanent Double  Catheter 08/30/23  --  Internal jugular  646                  Telemetry:  Telemetry Orders (From admission, onward)               24 Hour Telemetry Monitoring  Continuous x 24 Hours (Telem)        Expiring   Question:  Reason for 24 Hour Telemetry  Answer:  Metabolic/electrolyte disturbance with high probability of dysrhythmia. K level <3 or >6 OR KCL infusion >10mEq/hr                  Lab Results: I have reviewed the following results:   Results from last 7 days   Lab Units 06/06/25 0518 06/05/25  0343   WBC Thousand/uL 4.13* 3.60*   HEMOGLOBIN g/dL 8.7* 8.8*   HEMATOCRIT % 28.4* 28.6*   PLATELETS Thousands/uL 196 184   SEGS PCT %  --  75   LYMPHO PCT %  --  12*   MONO PCT %  --  12   EOS PCT %  --  0     Results from last 7 days   Lab Units 06/06/25 0518 06/05/25  0343   SODIUM mmol/L 131* 132*   POTASSIUM mmol/L 4.3 4.7   CHLORIDE mmol/L 95* 94*   CO2 mmol/L 27 30   BUN mg/dL 34* 26*   CREATININE mg/dL 4.11* 4.89*   ANION GAP mmol/L 9 8   CALCIUM mg/dL 8.4 8.3*   ALBUMIN g/dL  --  3.3*   TOTAL BILIRUBIN mg/dL  --  0.46   ALK PHOS U/L  --  75   ALT U/L  --  22   AST U/L  --  63*   GLUCOSE RANDOM mg/dL 171* 102     Results from last 7 days   Lab Units 06/04/25  0538   INR  1.12     Results from last 7 days   Lab Units 06/06/25  1051 06/06/25  0757 06/05/25 2022 06/05/25  1638 06/05/25  1117 06/05/25  0728 06/04/25  2019 06/04/25  1610 06/04/25  1053 06/04/25  0809   POC GLUCOSE mg/dl 173* 171* 195* 151* 110 93 103 112 86 118     Results from last 7 days   Lab Units 06/04/25 2011   HEMOGLOBIN A1C % 4.8     Results from last 7 days   Lab Units 06/04/25  0359   LACTIC ACID mmol/L 1.8     Recent Cultures (last 7 days):   Results from last 7 days   Lab Units 06/04/25  0538 06/04/25  0359   BLOOD CULTURE  No Growth at 48 hrs. No Growth at 48 hrs.     Last 24 Hours Medication List:     Current Facility-Administered Medications:     acetaminophen (TYLENOL) tablet 650 mg, Q6H PRN    allopurinol (ZYLOPRIM) tablet 100  mg, Once per day on Tuesday Thursday Saturday    aspirin chewable tablet 81 mg, Daily    atorvastatin (LIPITOR) tablet 80 mg, Daily With Dinner    calcium carbonate (TUMS) chewable tablet 500 mg, BID PRN    dexamethasone (PF) (DECADRON) injection 6 mg, Q24H    famotidine (PEPCID) tablet 20 mg, HS    gabapentin (NEURONTIN) capsule 100 mg, Once per day on Monday Wednesday Friday    HYDROmorphone HCl (DILAUDID) injection 0.2 mg, Q4H PRN    insulin lispro (HumALOG/ADMELOG) 100 units/mL subcutaneous injection 1-5 Units, TID AC **AND** Fingerstick Glucose (POCT), TID AC    ipratropium-albuterol (DUO-NEB) 0.5-2.5 mg/3 mL inhalation solution 3 mL, TID    lidocaine (LIDODERM) 5 % patch 1 patch, Daily    metoprolol succinate (TOPROL-XL) 24 hr tablet 25 mg, Q12H KEMAL    oxyCODONE (ROXICODONE) split tablet 2.5 mg, Q8H PRN    [COMPLETED] remdesivir (Veklury) 200 mg in sodium chloride 0.9 % 290 mL IVPB, Q24H, Last Rate: Stopped (06/05/25 0645) **FOLLOWED BY** remdesivir (Veklury) 100 mg in sodium chloride 0.9 % 270 mL IVPB, Q24H    senna (SENOKOT) tablet 8.6 mg, Daily With Lunch    sevelamer (RENAGEL) tablet 1,600 mg, TID With Meals    Administrative Statements   Today, Patient Was Seen By: Milan Ruiz MD    **Please Note: This note may have been constructed using a voice recognition system.**

## 2025-06-06 NOTE — PLAN OF CARE
Problem: OCCUPATIONAL THERAPY ADULT  Goal: Performs self-care activities at highest level of function for planned discharge setting.  See evaluation for individualized goals.  Description: Treatment Interventions: ADL retraining, Visual perceptual retraining, Functional transfer training, Endurance training, Equipment evaluation/education, Energy conservation, Activityengagement, UE strengthening/ROM, Fine motor coordination activities          See flowsheet documentation for full assessment, interventions and recommendations.   Outcome: Progressing  Note: Limitation: Decreased ADL status, Decreased Safe judgement during ADL, Decreased high-level ADLs, Decreased self-care trans, Decreased endurance  Prognosis: Good  Assessment: Pt seen for OT treatment session. Pt making steady progress towards IP OT goals. Pt progressing to supervision for mobility and transfers using RW with intermittent cueing for hand placement and safe technique. Pt limited by fatigue, overall weakness and deconditioning impacting fxl performance. Pt would benefit from cont OT services to maximize safety and fxl performance of ADLs. Recommend level II moderate resource intensity when medically cleared for d/c.     Rehab Resource Intensity Level, OT: II (Moderate Resource Intensity)

## 2025-06-06 NOTE — ASSESSMENT & PLAN NOTE
Lab Results   Component Value Date    EGFR 14 06/06/2025    EGFR 11 06/05/2025    EGFR 7 06/04/2025    CREATININE 4.11 (H) 06/06/2025    CREATININE 4.89 (H) 06/05/2025    CREATININE 7.00 (H) 06/04/2025

## 2025-06-06 NOTE — ASSESSMENT & PLAN NOTE
Lab Results   Component Value Date    EGFR 14 06/06/2025    EGFR 11 06/05/2025    EGFR 7 06/04/2025    CREATININE 4.11 (H) 06/06/2025    CREATININE 4.89 (H) 06/05/2025    CREATININE 7.00 (H) 06/04/2025   followed by nephrology   on a Tuesday, Thursday, Saturday schedule  receiving extra dose of hemodialysis today

## 2025-06-06 NOTE — ASSESSMENT & PLAN NOTE
Patient with significant troponin elevation greater than 22,973, now trending down.  No chest pain.  Concern for NSTEMI.  Initially treated with IV heparin now discontinued per recommendations from cardiology.  Patient is adamantly refusing cardiac catheterization as recommended by cardiology.

## 2025-06-06 NOTE — ASSESSMENT & PLAN NOTE
Patient presents from rehab facility due to shortness of breath.  He was noted to be hypoxic to 84% on room air and in respiratory distress by medics  Initially required BiPAP in the ER and currently on 4 L  VBG showed pH of 7.3 with PCO2 of 52 and PO2 of 30  Likely in the setting of volume overload due to noncompliance with dialysis sessions  Chest x-ray appears to be pulmonary edema however official read is pending  Further management with dialysis.  Titrate off oxygen as tolerated  COVID pending    6/6 - Secondary to volume overload from missed hemodialysis.  Continue hemodialysis per recommendations from nephrology.  Patient also found to be positive for COVID-19 which may also be contributing. Overall, respiratory status is improved.

## 2025-06-07 ENCOUNTER — APPOINTMENT (INPATIENT)
Dept: DIALYSIS | Facility: HOSPITAL | Age: 65
DRG: 177 | End: 2025-06-07
Payer: MEDICARE

## 2025-06-07 LAB
ANION GAP SERPL CALCULATED.3IONS-SCNC: 13 MMOL/L (ref 4–13)
BUN SERPL-MCNC: 51 MG/DL (ref 5–25)
CALCIUM SERPL-MCNC: 8.5 MG/DL (ref 8.4–10.2)
CHLORIDE SERPL-SCNC: 93 MMOL/L (ref 96–108)
CO2 SERPL-SCNC: 23 MMOL/L (ref 21–32)
CREAT SERPL-MCNC: 5.22 MG/DL (ref 0.6–1.3)
ERYTHROCYTE [DISTWIDTH] IN BLOOD BY AUTOMATED COUNT: 14.8 % (ref 11.6–15.1)
GFR SERPL CREATININE-BSD FRML MDRD: 10 ML/MIN/1.73SQ M
GLUCOSE SERPL-MCNC: 114 MG/DL (ref 65–140)
GLUCOSE SERPL-MCNC: 124 MG/DL (ref 65–140)
GLUCOSE SERPL-MCNC: 128 MG/DL (ref 65–140)
GLUCOSE SERPL-MCNC: 135 MG/DL (ref 65–140)
GLUCOSE SERPL-MCNC: 202 MG/DL (ref 65–140)
HCT VFR BLD AUTO: 28.9 % (ref 36.5–49.3)
HGB BLD-MCNC: 8.9 G/DL (ref 12–17)
MAGNESIUM SERPL-MCNC: 2.1 MG/DL (ref 1.9–2.7)
MCH RBC QN AUTO: 29.9 PG (ref 26.8–34.3)
MCHC RBC AUTO-ENTMCNC: 30.8 G/DL (ref 31.4–37.4)
MCV RBC AUTO: 97 FL (ref 82–98)
PLATELET # BLD AUTO: 169 THOUSANDS/UL (ref 149–390)
PMV BLD AUTO: 9.9 FL (ref 8.9–12.7)
POTASSIUM SERPL-SCNC: 4.3 MMOL/L (ref 3.5–5.3)
RBC # BLD AUTO: 2.98 MILLION/UL (ref 3.88–5.62)
SODIUM SERPL-SCNC: 129 MMOL/L (ref 135–147)
WBC # BLD AUTO: 4.18 THOUSAND/UL (ref 4.31–10.16)

## 2025-06-07 PROCEDURE — 85027 COMPLETE CBC AUTOMATED: CPT | Performed by: FAMILY MEDICINE

## 2025-06-07 PROCEDURE — 94640 AIRWAY INHALATION TREATMENT: CPT

## 2025-06-07 PROCEDURE — 99232 SBSQ HOSP IP/OBS MODERATE 35: CPT | Performed by: INTERNAL MEDICINE

## 2025-06-07 PROCEDURE — 94760 N-INVAS EAR/PLS OXIMETRY 1: CPT

## 2025-06-07 PROCEDURE — 82948 REAGENT STRIP/BLOOD GLUCOSE: CPT

## 2025-06-07 PROCEDURE — 83735 ASSAY OF MAGNESIUM: CPT | Performed by: FAMILY MEDICINE

## 2025-06-07 PROCEDURE — 80048 BASIC METABOLIC PNL TOTAL CA: CPT | Performed by: FAMILY MEDICINE

## 2025-06-07 PROCEDURE — 99232 SBSQ HOSP IP/OBS MODERATE 35: CPT | Performed by: FAMILY MEDICINE

## 2025-06-07 PROCEDURE — 94664 DEMO&/EVAL PT USE INHALER: CPT

## 2025-06-07 RX ADMIN — HYDROMORPHONE HYDROCHLORIDE 0.2 MG: 0.2 INJECTION, SOLUTION INTRAMUSCULAR; INTRAVENOUS; SUBCUTANEOUS at 10:27

## 2025-06-07 RX ADMIN — METOPROLOL SUCCINATE 25 MG: 25 TABLET, EXTENDED RELEASE ORAL at 10:15

## 2025-06-07 RX ADMIN — Medication 2.5 MG: at 01:21

## 2025-06-07 RX ADMIN — REMDESIVIR 100 MG: 100 INJECTION, POWDER, LYOPHILIZED, FOR SOLUTION INTRAVENOUS at 13:00

## 2025-06-07 RX ADMIN — ALLOPURINOL 100 MG: 100 TABLET ORAL at 15:37

## 2025-06-07 RX ADMIN — HYDROMORPHONE HYDROCHLORIDE 0.2 MG: 0.2 INJECTION, SOLUTION INTRAMUSCULAR; INTRAVENOUS; SUBCUTANEOUS at 20:41

## 2025-06-07 RX ADMIN — IPRATROPIUM BROMIDE AND ALBUTEROL SULFATE 3 ML: .5; 3 SOLUTION RESPIRATORY (INHALATION) at 07:39

## 2025-06-07 RX ADMIN — ATORVASTATIN CALCIUM 80 MG: 80 TABLET, FILM COATED ORAL at 15:37

## 2025-06-07 RX ADMIN — HEPARIN SODIUM 5000 UNITS: 5000 INJECTION INTRAVENOUS; SUBCUTANEOUS at 10:15

## 2025-06-07 RX ADMIN — FAMOTIDINE 20 MG: 20 TABLET, FILM COATED ORAL at 21:08

## 2025-06-07 RX ADMIN — SEVELAMER HYDROCHLORIDE 1600 MG: 800 TABLET, FILM COATED ORAL at 15:37

## 2025-06-07 RX ADMIN — DEXAMETHASONE SODIUM PHOSPHATE 6 MG: 10 INJECTION, SOLUTION INTRAMUSCULAR; INTRAVENOUS at 10:15

## 2025-06-07 RX ADMIN — ASPIRIN 81 MG 81 MG: 81 TABLET ORAL at 10:15

## 2025-06-07 RX ADMIN — SEVELAMER HYDROCHLORIDE 1600 MG: 800 TABLET, FILM COATED ORAL at 10:15

## 2025-06-07 RX ADMIN — Medication 2.5 MG: at 16:30

## 2025-06-07 RX ADMIN — HYDROMORPHONE HYDROCHLORIDE 0.2 MG: 0.2 INJECTION, SOLUTION INTRAMUSCULAR; INTRAVENOUS; SUBCUTANEOUS at 02:35

## 2025-06-07 RX ADMIN — HEPARIN SODIUM 5000 UNITS: 5000 INJECTION INTRAVENOUS; SUBCUTANEOUS at 21:08

## 2025-06-07 RX ADMIN — METOPROLOL SUCCINATE 25 MG: 25 TABLET, EXTENDED RELEASE ORAL at 21:08

## 2025-06-07 RX ADMIN — INSULIN LISPRO 1 UNITS: 100 INJECTION, SOLUTION INTRAVENOUS; SUBCUTANEOUS at 16:37

## 2025-06-07 NOTE — PROGRESS NOTES
Progress Note - Hospitalist   Name: Naresh Carpio 64 y.o. male I MRN: [id number]  Unit/Bed#: [address] [place] [address] I Date of Admission: 6/4/2025   Date of Service: 6/7/2025 I Hospital Day: 2     Assessment & Plan  Acute respiratory failure with hypoxia (HCC)  Patient presents from rehab facility due to shortness of breath.  He was noted to be hypoxic to 84% on room air and in respiratory distress by medics  Initially required BiPAP in the ER and currently on 4 L  VBG showed pH of 7.3 with PCO2 of 52 and PO2 of 30  Likely in the setting of volume overload due to noncompliance with dialysis sessions  Chest x-ray appears to be pulmonary edema however official read is pending  Further management with dialysis.  Titrate off oxygen as tolerated  COVID pending    6/7 - Secondary to volume overload from missed hemodialysis.  Continue hemodialysis per recommendations from nephrology.  Patient also found to be positive for COVID-19 which may also be contributing. Overall, respiratory status is improved.  Elevated troponin  Patient with significant troponin elevation greater than 22,973, now trending down.  No chest pain.  Concern for NSTEMI.  Initially treated with IV heparin now discontinued per recommendations from cardiology.  Patient is adamantly refusing cardiac catheterization as recommended by cardiology.  ESRD (end stage renal disease) ([facility])  Lab Results   Component Value Date    EGFR 10 06/07/2025    EGFR 14 06/06/2025    EGFR 11 06/05/2025    CREATININE 5.22 (H) 06/07/2025    CREATININE 4.11 (H) 06/06/2025    CREATININE 4.89 (H) 06/05/2025     On HD TTS  Only completed 1 hour of dialysis on Tuesday per report given to ER  Continue hemodialysis per recommendations from nephrology  Bilateral leg edema  Bilateral leg edema - L > R, no longer on anticoagulation  Bilateral lower extremity venous duplex negative for DVT.  Essential hypertension  Continue Procardia XL and monitor blood pressures  SIRS (systemic inflammatory  response syndrome) ([facility])  As noted by tachycardia, tachypnea due to volume overload.  Lactic acid within normal limits.  No obvious signs of infection, hold off on antibiotics  Blood cultures drawn in the ER with no growth to date  Diabetes mellitus type 2 with peripheral artery disease (HCC)  Lab Results   Component Value Date    HGBA1C 4.8 06/04/2025       Recent Labs     06/06/25  1655 06/06/25  2013 06/07/25  0745 06/07/25  1117   POCGLU 188* 166* 124 114       (P) 136Place patient on Accu-Cheks with SSI. A1c 4.8.    Paroxysmal atrial fibrillation (HCC)  Continue Procardia XL.  Per recent discharge summary, patient was refusing Eliquis due to his recent history of retroperitoneal hematoma in April 2025  Volume overload secondary to noncompliance with hemodialysis  Wt Readings from Last 3 Encounters:   06/07/25 92.2 kg (203 lb 4.2 oz)   05/13/25 92.6 kg (204 lb 3.2 oz)   05/05/25 96.6 kg (213 lb)     Patient clinically appears volume overloaded from not completing dialysis session  Received 80 mg of IV Lasix in the ER  Hemodialysis per nephrology  Anemia in ESRD (end-stage renal disease)  ([facility])  Hemoglobin 8.9. Will continue to monitor.  L2 vertebral fracture ([facility])  Per prior note, patient to utilize LSO brace  PT/OT while here  Chronic kidney disease-mineral and bone disorder  Lab Results   Component Value Date    EGFR 10 06/07/2025    EGFR 14 06/06/2025    EGFR 11 06/05/2025    CREATININE 5.22 (H) 06/07/2025    CREATININE 4.11 (H) 06/06/2025    CREATININE 4.89 (H) 06/05/2025     Proliferative diabetic retinopathy of both eyes with macular edema associated with type 2 diabetes mellitus (HCC)    Patient has been following up with ophthalmology in the outpatient setting.  Patient underwent intravitreal Avastin treatment with Dr. Zapata on May 30th. Patient also sees Dr. Chaudhry. Spoke with Dr. Chaudhry who advised that patient should follow up with  upon discharge.  COVID-19  Continue remdesivir and  dexamethasone.  VTE Pharmacologic Prophylaxis:   Heparin drip    Mobility:   Basic Mobility Inpatient Raw Score: 18  -HL Goal: 6: Walk 10 steps or more  JH-HLM Achieved: 6: Walk 10 steps or more    Patient Centered Rounds: I performed bedside rounds with nursing staff today.     Current Length of Stay: 2 day(s)  Current Patient Status: Inpatient   Certification Statement: The patient will continue to require additional inpatient hospital stay due to above.  Discharge Plan: TBD    Code Status: Level 1 - Full Code    Subjective   Patient was seen and examined at bedside. No acute events overnight. No chest pain. No new complaints.    Objective :  Temp:  [97.7 °F (36.5 °C)-98.3 °F (36.8 °C)] 97.9 °F (36.6 °C)  HR:  [52-64] 52  BP: (104-163)/(53-82) 148/57  Resp:  [15-20] 15  SpO2:  [96 %-100 %] 100 %  O2 Device: None (Room air)    Body mass index is 26.82 kg/m².     Input and Output Summary (last 24 hours):     Intake/Output Summary (Last 24 hours) at 6/7/2025 1221  Last data filed at 6/7/2025 1040  Gross per 24 hour   Intake 350 ml   Output 520 ml   Net -170 ml     Physical Exam  HENT:      Head: Normocephalic.      Mouth/Throat:      Mouth: Mucous membranes are moist.     Eyes:      Extraocular Movements: Extraocular movements intact.       Cardiovascular:      Rate and Rhythm: Normal rate.   Pulmonary:      Effort: Pulmonary effort is normal. No respiratory distress.      Comments: Decreased in the bases  Abdominal:      Palpations: Abdomen is soft.      Tenderness: There is no abdominal tenderness.     Musculoskeletal:      Comments: Bilateral lower extremity edema     Skin:     General: Skin is warm.     Neurological:      Mental Status: He is alert and oriented to person, place, and time.     Psychiatric:         Mood and Affect: Mood normal.       Lines/Drains:  Lines/Drains/Airways       Active Status       Name Placement date Placement time Site Days    HD Permanent Double Catheter 08/30/23  --  Internal  jugular  647                  Telemetry:  Telemetry Orders (From admission, onward)               24 Hour Telemetry Monitoring  Continuous x 24 Hours (Telem)        Expiring   Question:  Reason for 24 Hour Telemetry  Answer:  Metabolic/electrolyte disturbance with high probability of dysrhythmia. K level <3 or >6 OR KCL infusion >10mEq/hr                  Lab Results: I have reviewed the following results:   Results from last 7 days   Lab Units 06/07/25 0452 06/06/25 0518 06/05/25  0343   WBC Thousand/uL 4.18*   < > 3.60*   HEMOGLOBIN g/dL 8.9*   < > 8.8*   HEMATOCRIT % 28.9*   < > 28.6*   PLATELETS Thousands/uL 169   < > 184   SEGS PCT %  --   --  75   LYMPHO PCT %  --   --  12*   MONO PCT %  --   --  12   EOS PCT %  --   --  0    < > = values in this interval not displayed.     Results from last 7 days   Lab Units 06/07/25 0452 06/06/25 0518 06/05/25  0343   SODIUM mmol/L 129*   < > 132*   POTASSIUM mmol/L 4.3   < > 4.7   CHLORIDE mmol/L 93*   < > 94*   CO2 mmol/L 23   < > 30   BUN mg/dL 51*   < > 26*   CREATININE mg/dL 5.22*   < > 4.89*   ANION GAP mmol/L 13   < > 8   CALCIUM mg/dL 8.5   < > 8.3*   ALBUMIN g/dL  --   --  3.3*   TOTAL BILIRUBIN mg/dL  --   --  0.46   ALK PHOS U/L  --   --  75   ALT U/L  --   --  22   AST U/L  --   --  63*   GLUCOSE RANDOM mg/dL 135   < > 102    < > = values in this interval not displayed.     Results from last 7 days   Lab Units 06/04/25  0538   INR  1.12     Results from last 7 days   Lab Units 06/07/25  1117 06/07/25  0745 06/06/25 2013 06/06/25  1655 06/06/25  1051 06/06/25  0757 06/05/25 2022 06/05/25  1638 06/05/25  1117 06/05/25  0728 06/04/25  2019 06/04/25  1610   POC GLUCOSE mg/dl 114 124 166* 188* 173* 171* 195* 151* 110 93 103 112     Results from last 7 days   Lab Units 06/04/25 2011   HEMOGLOBIN A1C % 4.8     Results from last 7 days   Lab Units 06/04/25  0359   LACTIC ACID mmol/L 1.8     Recent Cultures (last 7 days):   Results from last 7 days   Lab Units  06/04/25  0538 06/04/25  0359   BLOOD CULTURE  No Growth at 72 hrs. No Growth at 72 hrs.     Last 24 Hours Medication List:     Current Facility-Administered Medications:     acetaminophen (TYLENOL) tablet 650 mg, Q6H PRN    allopurinol (ZYLOPRIM) tablet 100 mg, Once per day on Tuesday Thursday Saturday    aspirin chewable tablet 81 mg, Daily    atorvastatin (LIPITOR) tablet 80 mg, Daily With Dinner    calcium carbonate (TUMS) chewable tablet 500 mg, BID PRN    dexamethasone (PF) (DECADRON) injection 6 mg, Q24H    famotidine (PEPCID) tablet 20 mg, HS    gabapentin (NEURONTIN) capsule 100 mg, Once per day on Monday Wednesday Friday    heparin (porcine) subcutaneous injection 5,000 Units, Q12H KEMAL    HYDROmorphone HCl (DILAUDID) injection 0.2 mg, Q4H PRN    insulin lispro (HumALOG/ADMELOG) 100 units/mL subcutaneous injection 1-5 Units, TID AC **AND** Fingerstick Glucose (POCT), TID AC    ipratropium-albuterol (DUO-NEB) 0.5-2.5 mg/3 mL inhalation solution 3 mL, TID    lidocaine (LIDODERM) 5 % patch 1 patch, Daily    metoprolol succinate (TOPROL-XL) 24 hr tablet 25 mg, Q12H KEMAL    oxyCODONE (ROXICODONE) split tablet 2.5 mg, Q8H PRN    [COMPLETED] remdesivir (Veklury) 200 mg in sodium chloride 0.9 % 290 mL IVPB, Q24H, Last Rate: Stopped (06/05/25 0645) **FOLLOWED BY** remdesivir (Veklury) 100 mg in sodium chloride 0.9 % 270 mL IVPB, Q24H    senna (SENOKOT) tablet 8.6 mg, Daily With Lunch    sevelamer (RENAGEL) tablet 1,600 mg, TID With Meals    Administrative Statements   Today, Patient Was Seen By: Milan Ruiz MD    **Please Note: This note may have been constructed using a voice recognition system.**

## 2025-06-07 NOTE — ASSESSMENT & PLAN NOTE
Felt to be due to fluid overload + infectious etiology.   COVID positive on admission.   Of note, troponin levels elevated and cards recommended cardiac cath which patient is refusing.   Appears to be improved clinically.

## 2025-06-07 NOTE — ASSESSMENT & PLAN NOTE
Lab Results   Component Value Date    EGFR 10 06/07/2025    EGFR 14 06/06/2025    EGFR 11 06/05/2025    CREATININE 5.22 (H) 06/07/2025    CREATININE 4.11 (H) 06/06/2025    CREATININE 4.89 (H) 06/05/2025     On HD TTS  Only completed 1 hour of dialysis on Tuesday per report given to ER  Continue hemodialysis per recommendations from nephrology

## 2025-06-07 NOTE — ASSESSMENT & PLAN NOTE
Lab Results   Component Value Date    HGBA1C 4.8 06/04/2025       Recent Labs     06/06/25  1655 06/06/25 2013 06/07/25  0745 06/07/25  1117   POCGLU 188* 166* 124 114       (P) 136Place patient on Accu-Cheks with SSI. A1c 4.8.

## 2025-06-07 NOTE — PLAN OF CARE
Patient stood 4x mid-tx despite multiple advice from this HD RN that it's not safe, for 20-30 seconds to relieve pain from left foot neuropathy that was not resolved by Dilaudid IV given by the Primary RN. Patient was assisted while standing. Dr. Mack informed.     Post-Dialysis RN Treatment Note    Blood Pressure:  Pre 148/53 mm/Hg  Post 162/61 mmHg   EDW:  94.7 kg    Weight:  Pre 92.2 kg   Post 89.7 kg   Mode of weight measurement: Standing Scale   Volume Removed:  2,500 ml    Treatment duration: 240 minutes    NS given:  No    Treatment shortened No   Medications given during Rx: None Reported   Estimated Kt/V:  Not Applicable   Access type: Permacath/TDC   Needle Gauge: n/a   Access Issues: No    Report called to primary nurse:   Yes, in person to DL RN      Started patient on hemodialysis treatment with UF goal of 2.5L net as tolerated per Dr. Mack x 4 hours x 3K bath for serum k+ of 4.3 today 6/7/25.    Problem: METABOLIC, FLUID AND ELECTROLYTES - ADULT  Goal: Electrolytes maintained within normal limits  Description: INTERVENTIONS:  - Monitor labs and assess patient for signs and symptoms of electrolyte imbalances  - Administer electrolyte replacement as ordered  - Monitor response to electrolyte replacements, including repeat lab results as appropriate  - Instruct patient on fluid and nutrition as appropriate  Outcome: Progressing  Goal: Fluid balance maintained  Description: INTERVENTIONS:  - Monitor labs   - Monitor I/O and WT  - Instruct patient on fluid and nutrition as appropriate  - Assess for signs & symptoms of volume excess or deficit  Outcome: Progressing

## 2025-06-07 NOTE — ASSESSMENT & PLAN NOTE
Lab Results   Component Value Date    EGFR 10 06/07/2025    EGFR 14 06/06/2025    EGFR 11 06/05/2025    CREATININE 5.22 (H) 06/07/2025    CREATININE 4.11 (H) 06/06/2025    CREATININE 4.89 (H) 06/05/2025

## 2025-06-07 NOTE — ASSESSMENT & PLAN NOTE
Wt Readings from Last 3 Encounters:   06/07/25 92.2 kg (203 lb 4.2 oz)   05/13/25 92.6 kg (204 lb 3.2 oz)   05/05/25 96.6 kg (213 lb)     Patient clinically appears volume overloaded from not completing dialysis session  Received 80 mg of IV Lasix in the ER  Hemodialysis per nephrology

## 2025-06-07 NOTE — ASSESSMENT & PLAN NOTE
[facility] TTS  Access: R IJ permcath  EDW 94.7 kg.   Had urgent HD treatment on Wednesday, 6/4/25, due to hyperkalemia and fluid overload.  Electrolytes are stable overall.  Plan for regular HD today.

## 2025-06-07 NOTE — ASSESSMENT & PLAN NOTE
As noted by tachycardia, tachypnea due to volume overload.  Lactic acid within normal limits.  No obvious signs of infection, hold off on antibiotics  Blood cultures drawn in the ER with no growth to date

## 2025-06-07 NOTE — ASSESSMENT & PLAN NOTE
BP is elevated.   Home Rx: Nifedipine 30 mg daily, furosemide 20 mg twice a day.  Current Rx: Metoprolol succ 25 mg BID.   Metoprolol added by cards due to NSTEMI.   Monitor BP with UF on HD.

## 2025-06-07 NOTE — ASSESSMENT & PLAN NOTE
Patient presents from rehab facility due to shortness of breath.  He was noted to be hypoxic to 84% on room air and in respiratory distress by medics  Initially required BiPAP in the ER and currently on 4 L  VBG showed pH of 7.3 with PCO2 of 52 and PO2 of 30  Likely in the setting of volume overload due to noncompliance with dialysis sessions  Chest x-ray appears to be pulmonary edema however official read is pending  Further management with dialysis.  Titrate off oxygen as tolerated  COVID pending    6/7 - Secondary to volume overload from missed hemodialysis.  Continue hemodialysis per recommendations from nephrology.  Patient also found to be positive for COVID-19 which may also be contributing. Overall, respiratory status is improved.

## 2025-06-07 NOTE — PROGRESS NOTES
NEPHROLOGY HOSPITAL PROGRESS NOTE   Naresh Carpio 64 y.o. male MRN: [id number]  Unit/Bed#: [address] [place] [address] Encounter: [id number]  Reason for Consult: ESRD on HD    Assessment & Plan  ESRD (end stage renal disease) ([facility])  [facility] TTS  Access: R RADHA chacon  EDW 94.7 kg.   Had urgent HD treatment on Wednesday, 6/4/25, due to hyperkalemia and fluid overload.  Electrolytes are stable overall.  Plan for regular HD today.    Acute respiratory failure with hypoxia ([facility])  Felt to be due to fluid overload + infectious etiology.   COVID positive on admission.   Of note, troponin levels elevated and cards recommended cardiac cath which patient is refusing.   Appears to be improved clinically.    Elevated troponin  Cardiology consulted.  Patient is refusing cardiac catheterization.      Essential hypertension  BP is elevated.   Home Rx: Nifedipine 30 mg daily, furosemide 20 mg twice a day.  Current Rx: Metoprolol succ 25 mg BID.   Metoprolol added by cards due to NSTEMI.   Monitor BP with UF on HD.    Volume overload secondary to noncompliance with hemodialysis  10/9/24 Echo EF 60-65%, G1DD  Currently compensated.    Anemia in ESRD (end-stage renal disease)  ([facility])  Hgb is below goal at 8.9  He is on Mircera 150 mcg every 2 weeks and Venofer.     Chronic kidney disease-mineral and bone disorder  He is not on calcitriol in the outpatient setting.  Home Rx: Sevelamer.   Continue Sevelamer 1600 mg TID and low phos diet.   Phos at goal.     Bilateral leg edema  This is chronic and stable.     COVID-19  On remdesivir and dexamethasone per primary service.    TODAY's DISCUSSION / PLAN:  Regular HD today.   Try to challenge EDW today if possible.     SUBJECTIVE / 24H INTERVAL HISTORY:  No new acute events overnight.  Denies any worsening SOB or CP.    OBJECTIVE:  Current Weight: Weight - Scale: 90.7 kg (200 lb)  Vitals:    06/06/25 2335 06/07/25 0221 06/07/25 0741 06/07/25 0748   BP: 104/82 148/62  162/66   Pulse: 57 60  56  "  Resp: 18 18  18   Temp: 97.9 °F (36.6 °C) 98.3 °F (36.8 °C)  97.8 °F (36.6 °C)   TempSrc:       SpO2: 100% 97% 96% 99%   Weight:       Height:           Intake/Output Summary (Last 24 hours) at 6/7/2025 0822  Last data filed at 6/7/2025 0544  Gross per 24 hour   Intake 450 ml   Output 500 ml   Net -50 ml     General: conscious, cooperative, no distress  Skin: dry  Eyes: pink conjunctivae  ENT: moist mucous membranes  Respiratory: equal chest expansion, clear breath sounds.  Cardiovascular: distinct heart sounds, normal rate, regular rhythm, no rub  Abdomen: soft, non tender, non distended, normal bowel sounds  Extremities: + bilateral LE edema, L>R  Genitourinary: no gaitan catheter.   Neuro: awake, alert.   Psych: appropriate affect    Medications:  Current Medications[1]    Laboratory Results:  Results from last 7 days   Lab Units 06/07/25  0452 06/06/25  0518 06/05/25  0343 06/04/25  0359   WBC Thousand/uL 4.18* 4.13* 3.60* 8.53   HEMOGLOBIN g/dL 8.9* 8.7* 8.8* 9.6*   HEMATOCRIT % 28.9* 28.4* 28.6* 31.8*   PLATELETS Thousands/uL 169 196 184 218   POTASSIUM mmol/L 4.3 4.3 4.7 5.4*   CHLORIDE mmol/L 93* 95* 94* 91*   CO2 mmol/L 23 27 30 27   BUN mg/dL 51* 34* 26* 40*   CREATININE mg/dL 5.22* 4.11* 4.89* 7.00*   CALCIUM mg/dL 8.5 8.4 8.3* 9.0   MAGNESIUM mg/dL 2.1 2.0 2.0  --    PHOSPHORUS mg/dL  --   --  4.2*  --      Portions of the record may have been created with voice recognition software. Occasional wrong word or \"sound a like\" substitutions may have occurred due to the inherent limitations of voice recognition software. Read the chart carefully and recognize, using context, where substitutions have occurred.If you have any questions, please contact the dictating provider.         [1]   Current Facility-Administered Medications:     acetaminophen (TYLENOL) tablet 650 mg, 650 mg, Oral, Q6H PRN, Lore Silver DO    allopurinol (ZYLOPRIM) tablet 100 mg, 100 mg, Oral, Once per day on Tuesday Thursday " Saturday, Lore Silver DO, 100 mg at 06/05/25 1517    aspirin chewable tablet 81 mg, 81 mg, Oral, Daily, Lore Silver DO, 81 mg at 06/06/25 0826    atorvastatin (LIPITOR) tablet 80 mg, 80 mg, Oral, Daily With Dinner, Lore Silver DO, 80 mg at 06/06/25 1720    calcium carbonate (TUMS) chewable tablet 500 mg, 500 mg, Oral, BID PRN, GRANT Rich, 500 mg at 06/06/25 0113    dexamethasone (PF) (DECADRON) injection 6 mg, 6 mg, Intravenous, Q24H, Milan Ruiz MD, 6 mg at 06/06/25 0828    famotidine (PEPCID) tablet 20 mg, 20 mg, Oral, HS, GRANT Rich, 20 mg at 06/06/25 2147    gabapentin (NEURONTIN) capsule 100 mg, 100 mg, Oral, Once per day on Monday Wednesday Friday, Milan Ruiz MD    heparin (porcine) subcutaneous injection 5,000 Units, 5,000 Units, Subcutaneous, Q12H KEMAL, Milan Ruiz MD, 5,000 Units at 06/06/25 2148    HYDROmorphone HCl (DILAUDID) injection 0.2 mg, 0.2 mg, Intravenous, Q4H PRN, RAMEZ RichNP, 0.2 mg at 06/07/25 0235    insulin lispro (HumALOG/ADMELOG) 100 units/mL subcutaneous injection 1-5 Units, 1-5 Units, Subcutaneous, TID AC, 1 Units at 06/06/25 1720 **AND** Fingerstick Glucose (POCT), , , TID AC, Lore Silver DO    ipratropium-albuterol (DUO-NEB) 0.5-2.5 mg/3 mL inhalation solution 3 mL, 3 mL, Nebulization, TID, Milan Ruiz MD, 3 mL at 06/07/25 0739    lidocaine (LIDODERM) 5 % patch 1 patch, 1 patch, Topical, Daily, Lore Silver DO, 1 patch at 06/06/25 0825    metoprolol succinate (TOPROL-XL) 24 hr tablet 25 mg, 25 mg, Oral, Q12H KEMAL, GRANT Shell, 25 mg at 06/06/25 2147    oxyCODONE (ROXICODONE) split tablet 2.5 mg, 2.5 mg, Oral, Q8H PRN, GRANT Rich, 2.5 mg at 06/07/25 0121    [COMPLETED] remdesivir (Veklury) 200 mg in sodium chloride 0.9 % 290 mL IVPB, 200 mg, Intravenous, Q24H, Stopped at 06/05/25 0645 **FOLLOWED BY** remdesivir (Veklury) 100 mg in sodium chloride 0.9 % 270 mL IVPB, 100 mg,  Intravenous, Q24H, Milan Ruiz MD, 100 mg at 06/06/25 1030    senna (SENOKOT) tablet 8.6 mg, 8.6 mg, Oral, Daily With Lunch, Lore Silver DO, 8.6 mg at 06/06/25 1151    sevelamer (RENAGEL) tablet 1,600 mg, 1,600 mg, Oral, TID With Meals, Lore Silver DO, 1,600 mg at 06/06/25 1720

## 2025-06-08 LAB
ANION GAP SERPL CALCULATED.3IONS-SCNC: 14 MMOL/L (ref 4–13)
BUN SERPL-MCNC: 38 MG/DL (ref 5–25)
CALCIUM SERPL-MCNC: 8.5 MG/DL (ref 8.4–10.2)
CHLORIDE SERPL-SCNC: 91 MMOL/L (ref 96–108)
CO2 SERPL-SCNC: 24 MMOL/L (ref 21–32)
CREAT SERPL-MCNC: 3.93 MG/DL (ref 0.6–1.3)
ERYTHROCYTE [DISTWIDTH] IN BLOOD BY AUTOMATED COUNT: 14.8 % (ref 11.6–15.1)
GFR SERPL CREATININE-BSD FRML MDRD: 15 ML/MIN/1.73SQ M
GLUCOSE SERPL-MCNC: 102 MG/DL (ref 65–140)
GLUCOSE SERPL-MCNC: 129 MG/DL (ref 65–140)
GLUCOSE SERPL-MCNC: 174 MG/DL (ref 65–140)
GLUCOSE SERPL-MCNC: 224 MG/DL (ref 65–140)
GLUCOSE SERPL-MCNC: 95 MG/DL (ref 65–140)
HCT VFR BLD AUTO: 28.8 % (ref 36.5–49.3)
HGB BLD-MCNC: 9 G/DL (ref 12–17)
MAGNESIUM SERPL-MCNC: 1.9 MG/DL (ref 1.9–2.7)
MCH RBC QN AUTO: 29.9 PG (ref 26.8–34.3)
MCHC RBC AUTO-ENTMCNC: 31.3 G/DL (ref 31.4–37.4)
MCV RBC AUTO: 96 FL (ref 82–98)
PLATELET # BLD AUTO: 180 THOUSANDS/UL (ref 149–390)
PMV BLD AUTO: 10.1 FL (ref 8.9–12.7)
POTASSIUM SERPL-SCNC: 4.3 MMOL/L (ref 3.5–5.3)
RBC # BLD AUTO: 3.01 MILLION/UL (ref 3.88–5.62)
SODIUM SERPL-SCNC: 129 MMOL/L (ref 135–147)
WBC # BLD AUTO: 6.13 THOUSAND/UL (ref 4.31–10.16)

## 2025-06-08 PROCEDURE — 80048 BASIC METABOLIC PNL TOTAL CA: CPT | Performed by: FAMILY MEDICINE

## 2025-06-08 PROCEDURE — 82948 REAGENT STRIP/BLOOD GLUCOSE: CPT

## 2025-06-08 PROCEDURE — 94640 AIRWAY INHALATION TREATMENT: CPT

## 2025-06-08 PROCEDURE — 83735 ASSAY OF MAGNESIUM: CPT | Performed by: FAMILY MEDICINE

## 2025-06-08 PROCEDURE — 94760 N-INVAS EAR/PLS OXIMETRY 1: CPT

## 2025-06-08 PROCEDURE — 94664 DEMO&/EVAL PT USE INHALER: CPT

## 2025-06-08 PROCEDURE — 99232 SBSQ HOSP IP/OBS MODERATE 35: CPT | Performed by: FAMILY MEDICINE

## 2025-06-08 PROCEDURE — 85027 COMPLETE CBC AUTOMATED: CPT | Performed by: FAMILY MEDICINE

## 2025-06-08 RX ORDER — OLANZAPINE 10 MG/2ML
2.5 INJECTION, POWDER, FOR SOLUTION INTRAMUSCULAR ONCE
Status: COMPLETED | OUTPATIENT
Start: 2025-06-08 | End: 2025-06-08

## 2025-06-08 RX ORDER — IPRATROPIUM BROMIDE AND ALBUTEROL SULFATE 2.5; .5 MG/3ML; MG/3ML
3 SOLUTION RESPIRATORY (INHALATION) EVERY 4 HOURS PRN
Status: DISCONTINUED | OUTPATIENT
Start: 2025-06-08 | End: 2025-06-13 | Stop reason: HOSPADM

## 2025-06-08 RX ORDER — OLANZAPINE 10 MG/2ML
5 INJECTION, POWDER, FOR SOLUTION INTRAMUSCULAR ONCE
Status: COMPLETED | OUTPATIENT
Start: 2025-06-08 | End: 2025-06-08

## 2025-06-08 RX ADMIN — SEVELAMER HYDROCHLORIDE 1600 MG: 800 TABLET, FILM COATED ORAL at 08:16

## 2025-06-08 RX ADMIN — HYDROMORPHONE HYDROCHLORIDE 0.2 MG: 0.2 INJECTION, SOLUTION INTRAMUSCULAR; INTRAVENOUS; SUBCUTANEOUS at 19:46

## 2025-06-08 RX ADMIN — DEXAMETHASONE SODIUM PHOSPHATE 6 MG: 10 INJECTION, SOLUTION INTRAMUSCULAR; INTRAVENOUS at 08:21

## 2025-06-08 RX ADMIN — Medication 2.5 MG: at 00:01

## 2025-06-08 RX ADMIN — SEVELAMER HYDROCHLORIDE 1600 MG: 800 TABLET, FILM COATED ORAL at 11:21

## 2025-06-08 RX ADMIN — METOPROLOL SUCCINATE 25 MG: 25 TABLET, EXTENDED RELEASE ORAL at 20:57

## 2025-06-08 RX ADMIN — ASPIRIN 81 MG 81 MG: 81 TABLET ORAL at 08:16

## 2025-06-08 RX ADMIN — FAMOTIDINE 20 MG: 20 TABLET, FILM COATED ORAL at 21:02

## 2025-06-08 RX ADMIN — REMDESIVIR 100 MG: 100 INJECTION, POWDER, LYOPHILIZED, FOR SOLUTION INTRAVENOUS at 11:21

## 2025-06-08 RX ADMIN — Medication 3 MG: at 20:56

## 2025-06-08 RX ADMIN — METOPROLOL SUCCINATE 25 MG: 25 TABLET, EXTENDED RELEASE ORAL at 08:16

## 2025-06-08 RX ADMIN — SEVELAMER HYDROCHLORIDE 1600 MG: 800 TABLET, FILM COATED ORAL at 16:22

## 2025-06-08 RX ADMIN — IPRATROPIUM BROMIDE AND ALBUTEROL SULFATE 3 ML: .5; 3 SOLUTION RESPIRATORY (INHALATION) at 07:38

## 2025-06-08 RX ADMIN — INSULIN LISPRO 1 UNITS: 100 INJECTION, SOLUTION INTRAVENOUS; SUBCUTANEOUS at 11:21

## 2025-06-08 RX ADMIN — HEPARIN SODIUM 5000 UNITS: 5000 INJECTION INTRAVENOUS; SUBCUTANEOUS at 08:16

## 2025-06-08 RX ADMIN — OLANZAPINE 2.5 MG: 10 INJECTION, POWDER, FOR SOLUTION INTRAMUSCULAR at 20:59

## 2025-06-08 RX ADMIN — HYDROMORPHONE HYDROCHLORIDE 0.2 MG: 0.2 INJECTION, SOLUTION INTRAMUSCULAR; INTRAVENOUS; SUBCUTANEOUS at 08:16

## 2025-06-08 RX ADMIN — Medication 2.5 MG: at 22:29

## 2025-06-08 RX ADMIN — ACETAMINOPHEN 650 MG: 325 TABLET, FILM COATED ORAL at 20:55

## 2025-06-08 RX ADMIN — HEPARIN SODIUM 5000 UNITS: 5000 INJECTION INTRAVENOUS; SUBCUTANEOUS at 20:58

## 2025-06-08 RX ADMIN — HYDROMORPHONE HYDROCHLORIDE 0.2 MG: 0.2 INJECTION, SOLUTION INTRAMUSCULAR; INTRAVENOUS; SUBCUTANEOUS at 02:56

## 2025-06-08 RX ADMIN — INSULIN LISPRO 1 UNITS: 100 INJECTION, SOLUTION INTRAVENOUS; SUBCUTANEOUS at 16:22

## 2025-06-08 RX ADMIN — ATORVASTATIN CALCIUM 80 MG: 80 TABLET, FILM COATED ORAL at 16:22

## 2025-06-08 RX ADMIN — OLANZAPINE 5 MG: 10 INJECTION, POWDER, FOR SOLUTION INTRAMUSCULAR at 23:19

## 2025-06-08 NOTE — PLAN OF CARE
Problem: METABOLIC, FLUID AND ELECTROLYTES - ADULT  Goal: Electrolytes maintained within normal limits  Description: INTERVENTIONS:  - Monitor labs and assess patient for signs and symptoms of electrolyte imbalances  - Administer electrolyte replacement as ordered  - Monitor response to electrolyte replacements, including repeat lab results as appropriate  - Instruct patient on fluid and nutrition as appropriate  Outcome: Progressing  Goal: Fluid balance maintained  Description: INTERVENTIONS:  - Monitor labs   - Monitor I/O and WT  - Instruct patient on fluid and nutrition as appropriate  - Assess for signs & symptoms of volume excess or deficit  Outcome: Progressing     Problem: Prexisting or High Potential for Compromised Skin Integrity  Goal: Skin integrity is maintained or improved  Description: INTERVENTIONS:  - Identify patients at risk for skin breakdown  - Assess and monitor skin integrity including under and around medical devices   - Assess and monitor nutrition and hydration status  - Monitor labs  - Assess for incontinence   - Turn and reposition patient  - Assist with mobility/ambulation  - Relieve pressure over chente prominences   - Avoid friction and shearing  - Provide appropriate hygiene as needed including keeping skin clean and dry  - Evaluate need for skin moisturizer/barrier cream  - Collaborate with interdisciplinary team  - Patient/family teaching  - Consider wound care consult    Assess:  - Review Praful scale daily  - Clean and moisturize skin every shift  - Inspect skin when repositioning, toileting, and assisting with ADLS  - Assess under medical devices such as masimo every shift  - Assess extremities for adequate circulation and sensation     Bed Management:  - Have minimal linens on bed & keep smooth, unwrinkled  - Change linens as needed when moist or perspiring  - Avoid sitting or lying in one position for more than 2 hours while in bed?Keep HOB at 45degrees   - Toileting:  - Offer  bedside commode  - Assess for incontinence every 2 hours  - Use incontinent care products after each incontinent episode such as gray pads    Activity:  - Mobilize patient 4 times a day  - Encourage activity and walks on unit  - Encourage or provide ROM exercises   - Turn and reposition patient every 2 Hours  - Use appropriate equipment to lift or move patient in bed  - Instruct/ Assist with weight shifting every incident when out of bed in chair  - Consider limitation of chair time 2 hour intervals    Skin Care:  - Avoid use of baby powder, tape, friction and shearing, hot water or constrictive clothing  - Relieve pressure over bony prominences using waffle cushions  - Do not massage red bony areas    Next Steps:  - Teach patient strategies to minimize risks such as falls  - Consider consults to  interdisciplinary teams such as PT/OT  Outcome: Progressing     Problem: RESPIRATORY - ADULT  Goal: Achieves optimal ventilation and oxygenation  Description: INTERVENTIONS:  - Assess for changes in respiratory status  - Assess for changes in mentation and behavior  - Position to facilitate oxygenation and minimize respiratory effort  - Oxygen administered by appropriate delivery if ordered  - Initiate smoking cessation education as indicated  - Encourage broncho-pulmonary hygiene including cough, deep breathe, Incentive Spirometry  - Assess the need for suctioning and aspirate as needed  - Assess and instruct to report SOB or any respiratory difficulty  - Respiratory Therapy support as indicated  Outcome: Progressing     Problem: PAIN - ADULT  Goal: Verbalizes/displays adequate comfort level or baseline comfort level  Description: Interventions:  - Encourage patient to monitor pain and request assistance  - Assess pain using appropriate pain scale  - Administer analgesics as ordered based on type and severity of pain and evaluate response  - Implement non-pharmacological measures as appropriate and evaluate response  -  Consider cultural and social influences on pain and pain management  - Notify physician/advanced practitioner if interventions unsuccessful or patient reports new pain  - Educate patient/family on pain management process including their role and importance of  reporting pain   - Provide non-pharmacologic/complimentary pain relief interventions  Outcome: Progressing     Problem: INFECTION - ADULT  Goal: Absence or prevention of progression during hospitalization  Description: INTERVENTIONS:  - Assess and monitor for signs and symptoms of infection  - Monitor lab/diagnostic results  - Monitor all insertion sites, i.e. indwelling lines, tubes, and drains  - Monitor endotracheal if appropriate and nasal secretions for changes in amount and color  - [place] appropriate cooling/warming therapies per order  - Administer medications as ordered  - Instruct and encourage patient and family to use good hand hygiene technique  - Identify and instruct in appropriate isolation precautions for identified infection/condition  Outcome: Progressing     Problem: SAFETY ADULT  Goal: Patient will remain free of falls  Description: INTERVENTIONS:  - Educate patient/family on patient safety including physical limitations  - Instruct patient to call for assistance with activity   - Consider consulting OT/PT to assist with strengthening/mobility based on AM PAC & JH-HLM score  - Consult OT/PT to assist with strengthening/mobility   - Keep Call bell within reach  - Keep bed low and locked with side rails adjusted as appropriate  - Keep care items and personal belongings within reach  - Initiate and maintain comfort rounds  - Make Fall Risk Sign visible to staff  - Offer Toileting every 2 Hours, in advance of need  - Initiate/Maintain bed alarm  - Obtain necessary fall risk management equipment: bed alarm  - Apply yellow socks and bracelet for high fall risk patients  - Consider moving patient to room near nurses station  Outcome:  Progressing  Goal: Maintain or return to baseline ADL function  Description: INTERVENTIONS:  -  Assess patient's ability to carry out ADLs; assess patient's baseline for ADL function and identify physical deficits which impact ability to perform ADLs (bathing, care of mouth/teeth, toileting, grooming, dressing, etc.)  - Assess/evaluate cause of self-care deficits   - Assess range of motion  - Assess patient's mobility; develop plan if impaired  - Assess patient's need for assistive devices and provide as appropriate  - Encourage maximum independence but intervene and supervise when necessary  - Involve family in performance of ADLs  - Assess for home care needs following discharge   - Consider OT consult to assist with ADL evaluation and planning for discharge  - Provide patient education as appropriate  - Monitor functional capacity and physical performance, use of AM PAC & JH-HLM   - Monitor gait, balance and fatigue with ambulation    Outcome: Progressing  Goal: Maintains/Returns to pre admission functional level  Description: INTERVENTIONS:  - Perform AM-PAC 6 Click Basic Mobility/ Daily Activity assessment daily.  - Set and communicate daily mobility goal to care team and patient/family/caregiver.   - Collaborate with rehabilitation services on mobility goals if consulted  - Perform Range of Motion 4 times a day.  - Reposition patient every 2 hours.  - Dangle patient 4 times a day  - Stand patient 4 times a day  - Ambulate patient 4 times a day  - Out of bed to chair 4 times a day   - Out of bed for meals 3 times a day  - Out of bed for toileting  - Record patient progress and toleration of activity level   Outcome: Progressing     Problem: DISCHARGE PLANNING  Goal: Discharge to home or other facility with appropriate resources  Description: INTERVENTIONS:  - Identify barriers to discharge w/patient and caregiver  - Arrange for needed discharge resources and transportation as appropriate  - Identify discharge  learning needs (meds, wound care, etc.)  - Arrange for interpretive services to assist at discharge as needed  - Refer to Case Management Department for coordinating discharge planning if the patient needs post-hospital services based on physician/advanced practitioner order or complex needs related to functional status, cognitive ability, or social support system  Outcome: Progressing     Problem: Knowledge Deficit  Goal: Patient/family/caregiver demonstrates understanding of disease process, treatment plan, medications, and discharge instructions  Description: Complete learning assessment and assess knowledge base.  Interventions:  - Provide teaching at level of understanding  - Provide teaching via preferred learning methods  Outcome: Progressing     Problem: Nutrition/Hydration-ADULT  Goal: Nutrient/Hydration intake appropriate for improving, restoring or maintaining nutritional needs  Description: Monitor and assess patient's nutrition/hydration status for malnutrition. Collaborate with interdisciplinary team and initiate plan and interventions as ordered.  Monitor patient's weight and dietary intake as ordered or per policy. Utilize nutrition screening tool and intervene as necessary. Determine patient's food preferences and provide high-protein, high-caloric foods as appropriate.     INTERVENTIONS:  - Monitor oral intake, urinary output, labs, and treatment plans  - Assess nutrition and hydration status and recommend course of action  - Evaluate amount of meals eaten  - Assist patient with eating if necessary   - Allow adequate time for meals  - Recommend/ encourage appropriate diets, oral nutritional supplements, and vitamin/mineral supplements  - Order, calculate, and assess calorie counts as needed  - Recommend, monitor, and adjust tube feedings and TPN/PPN based on assessed needs  - Assess need for intravenous fluids  - Provide specific nutrition/hydration education as appropriate  - Include  patient/family/caregiver in decisions related to nutrition  Outcome: Progressing

## 2025-06-08 NOTE — ASSESSMENT & PLAN NOTE
Patient presents from rehab facility due to shortness of breath.  He was noted to be hypoxic to 84% on room air and in respiratory distress by medics  Initially required BiPAP in the ER and currently on 4 L  VBG showed pH of 7.3 with PCO2 of 52 and PO2 of 30  Likely in the setting of volume overload due to noncompliance with dialysis sessions  Chest x-ray appears to be pulmonary edema however official read is pending  Further management with dialysis.  Titrate off oxygen as tolerated  COVID pending    6/7 - Secondary to volume overload from missed hemodialysis.  Continue hemodialysis per recommendations from nephrology.  Patient also found to be positive for COVID-19 which may also be contributing. Overall, respiratory status is improved.    6/8 - Improved. Hemodialysis per nephrology. Plan to complete remdesivir today. Will also discontinue dexamethasone as respiratory status is stabilized and patient is saturating well on room air.

## 2025-06-08 NOTE — PLAN OF CARE
Problem: METABOLIC, FLUID AND ELECTROLYTES - ADULT  Goal: Electrolytes maintained within normal limits  Description: INTERVENTIONS:  - Monitor labs and assess patient for signs and symptoms of electrolyte imbalances  - Administer electrolyte replacement as ordered  - Monitor response to electrolyte replacements, including repeat lab results as appropriate  - Instruct patient on fluid and nutrition as appropriate  Outcome: Progressing     Problem: RESPIRATORY - ADULT  Goal: Achieves optimal ventilation and oxygenation  Description: INTERVENTIONS:  - Assess for changes in respiratory status  - Assess for changes in mentation and behavior  - Position to facilitate oxygenation and minimize respiratory effort  - Oxygen administered by appropriate delivery if ordered  - Initiate smoking cessation education as indicated  - Encourage broncho-pulmonary hygiene including cough, deep breathe, Incentive Spirometry  - Assess the need for suctioning and aspirate as needed  - Assess and instruct to report SOB or any respiratory difficulty  - Respiratory Therapy support as indicated  Outcome: Progressing     Problem: PAIN - ADULT  Goal: Verbalizes/displays adequate comfort level or baseline comfort level  Description: Interventions:  - Encourage patient to monitor pain and request assistance  - Assess pain using appropriate pain scale  - Administer analgesics as ordered based on type and severity of pain and evaluate response  - Implement non-pharmacological measures as appropriate and evaluate response  - Consider cultural and social influences on pain and pain management  - Notify physician/advanced practitioner if interventions unsuccessful or patient reports new pain  - Educate patient/family on pain management process including their role and importance of  reporting pain   - Provide non-pharmacologic/complimentary pain relief interventions  Outcome: Progressing     Problem: PAIN - ADULT  Goal: Verbalizes/displays adequate  comfort level or baseline comfort level  Description: Interventions:  - Encourage patient to monitor pain and request assistance  - Assess pain using appropriate pain scale  - Administer analgesics as ordered based on type and severity of pain and evaluate response  - Implement non-pharmacological measures as appropriate and evaluate response  - Consider cultural and social influences on pain and pain management  - Notify physician/advanced practitioner if interventions unsuccessful or patient reports new pain  - Educate patient/family on pain management process including their role and importance of  reporting pain   - Provide non-pharmacologic/complimentary pain relief interventions  Outcome: Progressing     Problem: INFECTION - ADULT  Goal: Absence or prevention of progression during hospitalization  Description: INTERVENTIONS:  - Assess and monitor for signs and symptoms of infection  - Monitor lab/diagnostic results  - Monitor all insertion sites, i.e. indwelling lines, tubes, and drains  - Monitor endotracheal if appropriate and nasal secretions for changes in amount and color  - [place] appropriate cooling/warming therapies per order  - Administer medications as ordered  - Instruct and encourage patient and family to use good hand hygiene technique  - Identify and instruct in appropriate isolation precautions for identified infection/condition  Outcome: Progressing     Problem: DISCHARGE PLANNING  Goal: Discharge to home or other facility with appropriate resources  Description: INTERVENTIONS:  - Identify barriers to discharge w/patient and caregiver  - Arrange for needed discharge resources and transportation as appropriate  - Identify discharge learning needs (meds, wound care, etc.)  - Arrange for interpretive services to assist at discharge as needed  - Refer to Case Management Department for coordinating discharge planning if the patient needs post-hospital services based on physician/advanced  practitioner order or complex needs related to functional status, cognitive ability, or social support system  Outcome: Progressing     Problem: Nutrition/Hydration-ADULT  Goal: Nutrient/Hydration intake appropriate for improving, restoring or maintaining nutritional needs  Description: Monitor and assess patient's nutrition/hydration status for malnutrition. Collaborate with interdisciplinary team and initiate plan and interventions as ordered.  Monitor patient's weight and dietary intake as ordered or per policy. Utilize nutrition screening tool and intervene as necessary. Determine patient's food preferences and provide high-protein, high-caloric foods as appropriate.     INTERVENTIONS:  - Monitor oral intake, urinary output, labs, and treatment plans  - Assess nutrition and hydration status and recommend course of action  - Evaluate amount of meals eaten  - Assist patient with eating if necessary   - Allow adequate time for meals  - Recommend/ encourage appropriate diets, oral nutritional supplements, and vitamin/mineral supplements  - Order, calculate, and assess calorie counts as needed  - Recommend, monitor, and adjust tube feedings and TPN/PPN based on assessed needs  - Assess need for intravenous fluids  - Provide specific nutrition/hydration education as appropriate  - Include patient/family/caregiver in decisions related to nutrition  Outcome: Progressing

## 2025-06-08 NOTE — PLAN OF CARE

## 2025-06-08 NOTE — ASSESSMENT & PLAN NOTE
Lab Results   Component Value Date    EGFR 15 06/08/2025    EGFR 10 06/07/2025    EGFR 14 06/06/2025    CREATININE 3.93 (H) 06/08/2025    CREATININE 5.22 (H) 06/07/2025    CREATININE 4.11 (H) 06/06/2025

## 2025-06-08 NOTE — ASSESSMENT & PLAN NOTE
Lab Results   Component Value Date    HGBA1C 4.8 06/04/2025       Recent Labs     06/07/25  1541 06/07/25 2025 06/08/25  0704 06/08/25  1054   POCGLU 202* 128 95 174*       (P) [id number] patient on Accu-Cheks with SSI. A1c 4.8.

## 2025-06-08 NOTE — ASSESSMENT & PLAN NOTE
Lab Results   Component Value Date    EGFR 15 06/08/2025    EGFR 10 06/07/2025    EGFR 14 06/06/2025    CREATININE 3.93 (H) 06/08/2025    CREATININE 5.22 (H) 06/07/2025    CREATININE 4.11 (H) 06/06/2025     On HD TTS  Only completed 1 hour of dialysis on Tuesday per report given to ER  Continue hemodialysis per recommendations from nephrology

## 2025-06-08 NOTE — PROGRESS NOTES
Progress Note - Hospitalist   Name: aNresh Carpio 64 y.o. male I MRN: [id number]  Unit/Bed#: [address] [place] [address] Date of Admission: 6/4/2025   Date of Service: 6/8/2025 I Hospital Day: 3     Assessment & Plan  Acute respiratory failure with hypoxia (HCC)  Patient presents from rehab facility due to shortness of breath.  He was noted to be hypoxic to 84% on room air and in respiratory distress by medics  Initially required BiPAP in the ER and currently on 4 L  VBG showed pH of 7.3 with PCO2 of 52 and PO2 of 30  Likely in the setting of volume overload due to noncompliance with dialysis sessions  Chest x-ray appears to be pulmonary edema however official read is pending  Further management with dialysis.  Titrate off oxygen as tolerated  COVID pending    6/7 - Secondary to volume overload from missed hemodialysis.  Continue hemodialysis per recommendations from nephrology.  Patient also found to be positive for COVID-19 which may also be contributing. Overall, respiratory status is improved.    6/8 - Improved. Hemodialysis per nephrology. Plan to complete remdesivir today. Will also discontinue dexamethasone as respiratory status is stabilized and patient is saturating well on room air.  Elevated troponin  Patient with significant troponin elevation greater than 22,973, now trending down.  No chest pain.  Concern for NSTEMI.  Initially treated with IV heparin now discontinued per recommendations from cardiology.  Patient is adamantly refusing cardiac catheterization as recommended by cardiology.  ESRD (end stage renal disease) ([facility])  Lab Results   Component Value Date    EGFR 15 06/08/2025    EGFR 10 06/07/2025    EGFR 14 06/06/2025    CREATININE 3.93 (H) 06/08/2025    CREATININE 5.22 (H) 06/07/2025    CREATININE 4.11 (H) 06/06/2025     On HD TTS  Only completed 1 hour of dialysis on Tuesday per report given to ER  Continue hemodialysis per recommendations from nephrology  Bilateral leg edema  Bilateral leg edema - L >  R, no longer on anticoagulation  Bilateral lower extremity venous duplex negative for DVT.  Essential hypertension  Continue Procardia XL and monitor blood pressures  SIRS (systemic inflammatory response syndrome) ([facility])  As noted by tachycardia, tachypnea due to volume overload.  Lactic acid within normal limits.  No obvious signs of infection, hold off on antibiotics  Blood cultures drawn in the ER with no growth to date  Diabetes mellitus type 2 with peripheral artery disease ([facility])  Lab Results   Component Value Date    HGBA1C 4.8 06/04/2025       Recent Labs     06/07/25  1541 06/07/25 2025 06/08/25  0704 06/08/25  1054   POCGLU 202* 128 95 174*       (P) [id number] patient on Accu-Cheks with SSI. A1c 4.8.    Paroxysmal atrial fibrillation (HCC)  Continue Procardia XL.  Per recent discharge summary, patient was refusing Eliquis due to his recent history of retroperitoneal hematoma in April 2025  Volume overload secondary to noncompliance with hemodialysis  Wt Readings from Last 3 Encounters:   06/07/25 89.7 kg (197 lb 12 oz)   05/13/25 92.6 kg (204 lb 3.2 oz)   05/05/25 96.6 kg (213 lb)     Patient clinically appears volume overloaded from not completing dialysis session  Received 80 mg of IV Lasix in the ER  Hemodialysis per nephrology  Anemia in ESRD (end-stage renal disease)  ([facility])  Hemoglobin 9.0. Will continue to monitor.  L2 vertebral fracture ([facility])  Per prior note, patient to utilize LSO brace  PT/OT while here  Chronic kidney disease-mineral and bone disorder  Lab Results   Component Value Date    EGFR 15 06/08/2025    EGFR 10 06/07/2025    EGFR 14 06/06/2025    CREATININE 3.93 (H) 06/08/2025    CREATININE 5.22 (H) 06/07/2025    CREATININE 4.11 (H) 06/06/2025     Proliferative diabetic retinopathy of both eyes with macular edema associated with type 2 diabetes mellitus (HCC)    Patient has been following up with ophthalmology in the outpatient setting.  Patient underwent intravitreal  Avastin treatment with Dr. Zapata on May 30th. Patient also sees Dr. Chaudhry. Spoke with Dr. Chaudhry who advised that patient should follow up with  upon discharge.  COVID-19  Plan to complete remdesivir today. Will also discontinue dexamethasone as respiratory status is stabilized and patient is saturating well on room air.  VTE Pharmacologic Prophylaxis:   Heparin prophylaxis    Mobility:   Basic Mobility Inpatient Raw Score: 18  JH-HLM Goal: 6: Walk 10 steps or more  JH-HLM Achieved: 6: Walk 10 steps or more    Patient Centered Rounds: I performed bedside rounds with nursing staff today.     Current Length of Stay: 3 day(s)  Current Patient Status: Inpatient   Certification Statement: The patient will continue to require additional inpatient hospital stay due to above.  Discharge Plan: TBD    Code Status: Level 1 - Full Code    Subjective   Patient was seen and examined at bedside. No acute events overnight. No chest pain. No new complaints.    Objective :  Temp:  [97.9 °F (36.6 °C)-98.2 °F (36.8 °C)] 97.9 °F (36.6 °C)  HR:  [57-61] 61  BP: (147-157)/(62-72) 147/72  Resp:  [15-17] 15  SpO2:  [95 %-98 %] 98 %  O2 Device: None (Room air)    Body mass index is 26.09 kg/m².     Input and Output Summary (last 24 hours):   No intake or output data in the 24 hours ending 06/08/25 1403    Physical Exam  HENT:      Head: Normocephalic.      Mouth/Throat:      Mouth: Mucous membranes are moist.     Eyes:      Extraocular Movements: Extraocular movements intact.       Cardiovascular:      Rate and Rhythm: Normal rate.   Pulmonary:      Effort: Pulmonary effort is normal. No respiratory distress.      Comments: Decreased in the bases  Abdominal:      Palpations: Abdomen is soft.      Tenderness: There is no abdominal tenderness.     Musculoskeletal:      Comments: Bilateral lower extremity edema     Skin:     General: Skin is warm.     Neurological:      Mental Status: He is alert and oriented to person,  place, and time.     Psychiatric:         Mood and Affect: Mood normal.       Lines/Drains:  Lines/Drains/Airways       Active Status       Name Placement date Placement time Site Days    HD Permanent Double Catheter 08/30/23  --  Internal jugular  648                  Telemetry:  Telemetry Orders (From admission, onward)               24 Hour Telemetry Monitoring  Continuous x 24 Hours ([facility])        Expiring   Question:  Reason for 24 Hour Telemetry  Answer:  Metabolic/electrolyte disturbance with high probability of dysrhythmia. K level <3 or >6 OR KCL infusion >10mEq/hr                  Lab Results: I have reviewed the following results:   Results from last 7 days   Lab Units 06/08/25 0519 06/06/25 0518 06/05/25  0343   WBC Thousand/uL 6.13   < > 3.60*   HEMOGLOBIN g/dL 9.0*   < > 8.8*   HEMATOCRIT % 28.8*   < > 28.6*   PLATELETS Thousands/uL 180   < > 184   SEGS PCT %  --   --  75   LYMPHO PCT %  --   --  12*   MONO PCT %  --   --  12   EOS PCT %  --   --  0    < > = values in this interval not displayed.     Results from last 7 days   Lab Units 06/08/25 0519 06/06/25 0518 06/05/25  0343   SODIUM mmol/L 129*   < > 132*   POTASSIUM mmol/L 4.3   < > 4.7   CHLORIDE mmol/L 91*   < > 94*   CO2 mmol/L 24   < > 30   BUN mg/dL 38*   < > 26*   CREATININE mg/dL 3.93*   < > 4.89*   ANION GAP mmol/L 14*   < > 8   CALCIUM mg/dL 8.5   < > 8.3*   ALBUMIN g/dL  --   --  3.3*   TOTAL BILIRUBIN mg/dL  --   --  0.46   ALK PHOS U/L  --   --  75   ALT U/L  --   --  22   AST U/L  --   --  63*   GLUCOSE RANDOM mg/dL 102   < > 102    < > = values in this interval not displayed.     Results from last 7 days   Lab Units 06/04/25  0538   INR  1.12     Results from last 7 days   Lab Units 06/08/25  1054 06/08/25  0704 06/07/25  2025 06/07/25  1541 06/07/25  1117 06/07/25  0745 06/06/25 2013 06/06/25  1655 06/06/25  1051 06/06/25  0757 06/05/25  2022 06/05/25  1638   POC GLUCOSE mg/dl 174* 95 128 202* 114 124 166* 188* 173* 171* 195*  151*     Results from last 7 days   Lab Units 06/04/25 2011   HEMOGLOBIN A1C % 4.8     Results from last 7 days   Lab Units 06/04/25  0359   LACTIC ACID mmol/L 1.8     Recent Cultures (last 7 days):   Results from last 7 days   Lab Units 06/04/25  0538 06/04/25  0359   BLOOD CULTURE  No Growth After 4 Days. No Growth After 4 Days.     Last 24 Hours Medication List:     Current Facility-Administered Medications:     acetaminophen (TYLENOL) tablet 650 mg, Q6H PRN    allopurinol (ZYLOPRIM) tablet 100 mg, Once per day on Tuesday Thursday Saturday    aspirin chewable tablet 81 mg, Daily    atorvastatin (LIPITOR) tablet 80 mg, Daily With Dinner    calcium carbonate (TUMS) chewable tablet 500 mg, BID PRN    famotidine (PEPCID) tablet 20 mg, HS    gabapentin (NEURONTIN) capsule 100 mg, Once per day on Monday Wednesday Friday    heparin (porcine) subcutaneous injection 5,000 Units, Q12H KEMAL    HYDROmorphone HCl (DILAUDID) injection 0.2 mg, Q4H PRN    insulin lispro (HumALOG/ADMELOG) 100 units/mL subcutaneous injection 1-5 Units, TID AC **AND** Fingerstick Glucose (POCT), TID AC    ipratropium-albuterol (DUO-NEB) 0.5-2.5 mg/3 mL inhalation solution 3 mL, Q4H PRN    lidocaine (LIDODERM) 5 % patch 1 patch, Daily    metoprolol succinate (TOPROL-XL) 24 hr tablet 25 mg, Q12H KEMAL    oxyCODONE (ROXICODONE) split tablet 2.5 mg, Q8H PRN    senna (SENOKOT) tablet 8.6 mg, Daily With Lunch    sevelamer (RENAGEL) tablet 1,600 mg, TID With Meals    Administrative Statements   Today, Patient Was Seen By: Milan Ruiz MD    **Please Note: This note may have been constructed using a voice recognition system.**

## 2025-06-08 NOTE — ASSESSMENT & PLAN NOTE
Wt Readings from Last 3 Encounters:   06/07/25 89.7 kg (197 lb 12 oz)   05/13/25 92.6 kg (204 lb 3.2 oz)   05/05/25 96.6 kg (213 lb)     Patient clinically appears volume overloaded from not completing dialysis session  Received 80 mg of IV Lasix in the ER  Hemodialysis per nephrology

## 2025-06-08 NOTE — ASSESSMENT & PLAN NOTE
Plan to complete remdesivir today. Will also discontinue dexamethasone as respiratory status is stabilized and patient is saturating well on room air.

## 2025-06-08 NOTE — TREATMENT PLAN
RENAL NOTE   Naresh Carpio 64 y.o. male MRN: [id number]  Unit/Bed#: [address] [place] [address] Encounter: [id number]    Chart reviewed.   Patient completed HD yesterday and had 2.5 kg UF done.   Post HD weight was 89.7 kg.   No documented acute events overnight.   BP stable.   Electrolytes and acid-base are stable.  Renal will follow-up tomorrow.  No objection to discharge from renal standpoint.  If discharged, new EDW will be 90 kg.

## 2025-06-09 ENCOUNTER — APPOINTMENT (INPATIENT)
Dept: RADIOLOGY | Facility: HOSPITAL | Age: 65
DRG: 177 | End: 2025-06-09
Payer: MEDICARE

## 2025-06-09 PROBLEM — G93.41 METABOLIC ENCEPHALOPATHY: Status: ACTIVE | Noted: 2025-06-09

## 2025-06-09 LAB
ALBUMIN SERPL BCG-MCNC: 3.8 G/DL (ref 3.5–5)
ALP SERPL-CCNC: 85 U/L (ref 34–104)
ALT SERPL W P-5'-P-CCNC: 12 U/L (ref 7–52)
AMMONIA PLAS-SCNC: 25 UMOL/L (ref 18–72)
ANION GAP SERPL CALCULATED.3IONS-SCNC: 11 MMOL/L (ref 4–13)
ANION GAP SERPL CALCULATED.3IONS-SCNC: 13 MMOL/L (ref 4–13)
AST SERPL W P-5'-P-CCNC: 16 U/L (ref 13–39)
BACTERIA BLD CULT: NORMAL
BACTERIA BLD CULT: NORMAL
BASE EX.OXY STD BLDV CALC-SCNC: 91.1 % (ref 60–80)
BASE EX.OXY STD BLDV CALC-SCNC: 97 % (ref 60–80)
BASE EXCESS BLDV CALC-SCNC: -1.8 MMOL/L
BASE EXCESS BLDV CALC-SCNC: -2.2 MMOL/L
BILIRUB SERPL-MCNC: 0.47 MG/DL (ref 0.2–1)
BUN SERPL-MCNC: 51 MG/DL (ref 5–25)
BUN SERPL-MCNC: 53 MG/DL (ref 5–25)
CALCIUM SERPL-MCNC: 8.7 MG/DL (ref 8.4–10.2)
CALCIUM SERPL-MCNC: 8.7 MG/DL (ref 8.4–10.2)
CHLORIDE SERPL-SCNC: 90 MMOL/L (ref 96–108)
CHLORIDE SERPL-SCNC: 92 MMOL/L (ref 96–108)
CO2 SERPL-SCNC: 23 MMOL/L (ref 21–32)
CO2 SERPL-SCNC: 25 MMOL/L (ref 21–32)
CREAT SERPL-MCNC: 4.84 MG/DL (ref 0.6–1.3)
CREAT SERPL-MCNC: 5.15 MG/DL (ref 0.6–1.3)
ERYTHROCYTE [DISTWIDTH] IN BLOOD BY AUTOMATED COUNT: 14.7 % (ref 11.6–15.1)
ERYTHROCYTE [DISTWIDTH] IN BLOOD BY AUTOMATED COUNT: 14.9 % (ref 11.6–15.1)
FOLATE SERPL-MCNC: 6.6 NG/ML
GFR SERPL CREATININE-BSD FRML MDRD: 10 ML/MIN/1.73SQ M
GFR SERPL CREATININE-BSD FRML MDRD: 11 ML/MIN/1.73SQ M
GLUCOSE SERPL-MCNC: 108 MG/DL (ref 65–140)
GLUCOSE SERPL-MCNC: 113 MG/DL (ref 65–140)
GLUCOSE SERPL-MCNC: 114 MG/DL (ref 65–140)
GLUCOSE SERPL-MCNC: 120 MG/DL (ref 65–140)
GLUCOSE SERPL-MCNC: 132 MG/DL (ref 65–140)
GLUCOSE SERPL-MCNC: 160 MG/DL (ref 65–140)
HCO3 BLDV-SCNC: 22.2 MMOL/L (ref 24–30)
HCO3 BLDV-SCNC: 22.3 MMOL/L (ref 24–30)
HCT VFR BLD AUTO: 28.9 % (ref 36.5–49.3)
HCT VFR BLD AUTO: 31.3 % (ref 36.5–49.3)
HGB BLD-MCNC: 9.2 G/DL (ref 12–17)
HGB BLD-MCNC: 9.9 G/DL (ref 12–17)
MAGNESIUM SERPL-MCNC: 1.9 MG/DL (ref 1.9–2.7)
MAGNESIUM SERPL-MCNC: 1.9 MG/DL (ref 1.9–2.7)
MCH RBC QN AUTO: 29.6 PG (ref 26.8–34.3)
MCH RBC QN AUTO: 29.8 PG (ref 26.8–34.3)
MCHC RBC AUTO-ENTMCNC: 31.6 G/DL (ref 31.4–37.4)
MCHC RBC AUTO-ENTMCNC: 31.8 G/DL (ref 31.4–37.4)
MCV RBC AUTO: 94 FL (ref 82–98)
MCV RBC AUTO: 94 FL (ref 82–98)
O2 CT BLDV-SCNC: 14 ML/DL
O2 CT BLDV-SCNC: 14.5 ML/DL
PCO2 BLDV: 35.3 MM HG (ref 42–50)
PCO2 BLDV: 36.7 MM HG (ref 42–50)
PH BLDV: 7.4 [PH] (ref 7.3–7.4)
PH BLDV: 7.42 [PH] (ref 7.3–7.4)
PHOSPHATE SERPL-MCNC: 3.5 MG/DL (ref 2.3–4.1)
PLATELET # BLD AUTO: 192 THOUSANDS/UL (ref 149–390)
PLATELET # BLD AUTO: 192 THOUSANDS/UL (ref 149–390)
PMV BLD AUTO: 10.2 FL (ref 8.9–12.7)
PMV BLD AUTO: 10.2 FL (ref 8.9–12.7)
PO2 BLDV: 160 MM HG (ref 35–45)
PO2 BLDV: 70.8 MM HG (ref 35–45)
POTASSIUM SERPL-SCNC: 4.3 MMOL/L (ref 3.5–5.3)
POTASSIUM SERPL-SCNC: 4.5 MMOL/L (ref 3.5–5.3)
PROT SERPL-MCNC: 7.2 G/DL (ref 6.4–8.4)
RBC # BLD AUTO: 3.09 MILLION/UL (ref 3.88–5.62)
RBC # BLD AUTO: 3.34 MILLION/UL (ref 3.88–5.62)
SODIUM SERPL-SCNC: 126 MMOL/L (ref 135–147)
SODIUM SERPL-SCNC: 128 MMOL/L (ref 135–147)
TSH SERPL DL<=0.05 MIU/L-ACNC: 1.49 UIU/ML (ref 0.45–4.5)
VIT B12 SERPL-MCNC: 536 PG/ML (ref 180–914)
WBC # BLD AUTO: 7.26 THOUSAND/UL (ref 4.31–10.16)
WBC # BLD AUTO: 9.86 THOUSAND/UL (ref 4.31–10.16)

## 2025-06-09 PROCEDURE — 80053 COMPREHEN METABOLIC PANEL: CPT

## 2025-06-09 PROCEDURE — 82607 VITAMIN B-12: CPT

## 2025-06-09 PROCEDURE — 80048 BASIC METABOLIC PNL TOTAL CA: CPT | Performed by: FAMILY MEDICINE

## 2025-06-09 PROCEDURE — 83735 ASSAY OF MAGNESIUM: CPT | Performed by: FAMILY MEDICINE

## 2025-06-09 PROCEDURE — 82805 BLOOD GASES W/O2 SATURATION: CPT

## 2025-06-09 PROCEDURE — 99232 SBSQ HOSP IP/OBS MODERATE 35: CPT | Performed by: FAMILY MEDICINE

## 2025-06-09 PROCEDURE — 5A1D70Z PERFORMANCE OF URINARY FILTRATION, INTERMITTENT, LESS THAN 6 HOURS PER DAY: ICD-10-PCS | Performed by: INTERNAL MEDICINE

## 2025-06-09 PROCEDURE — 82948 REAGENT STRIP/BLOOD GLUCOSE: CPT

## 2025-06-09 PROCEDURE — 83735 ASSAY OF MAGNESIUM: CPT

## 2025-06-09 PROCEDURE — 84100 ASSAY OF PHOSPHORUS: CPT

## 2025-06-09 PROCEDURE — 70450 CT HEAD/BRAIN W/O DYE: CPT

## 2025-06-09 PROCEDURE — 99232 SBSQ HOSP IP/OBS MODERATE 35: CPT | Performed by: INTERNAL MEDICINE

## 2025-06-09 PROCEDURE — 85027 COMPLETE CBC AUTOMATED: CPT

## 2025-06-09 PROCEDURE — 82140 ASSAY OF AMMONIA: CPT

## 2025-06-09 PROCEDURE — 84443 ASSAY THYROID STIM HORMONE: CPT

## 2025-06-09 PROCEDURE — 85027 COMPLETE CBC AUTOMATED: CPT | Performed by: FAMILY MEDICINE

## 2025-06-09 PROCEDURE — 82746 ASSAY OF FOLIC ACID SERUM: CPT

## 2025-06-09 RX ORDER — NIFEDIPINE 30 MG/1
30 TABLET, EXTENDED RELEASE ORAL DAILY
Status: DISCONTINUED | OUTPATIENT
Start: 2025-06-09 | End: 2025-06-13 | Stop reason: HOSPADM

## 2025-06-09 RX ADMIN — GABAPENTIN 100 MG: 100 CAPSULE ORAL at 10:07

## 2025-06-09 RX ADMIN — ATORVASTATIN CALCIUM 80 MG: 80 TABLET, FILM COATED ORAL at 17:39

## 2025-06-09 RX ADMIN — FAMOTIDINE 20 MG: 20 TABLET, FILM COATED ORAL at 21:51

## 2025-06-09 RX ADMIN — NIFEDIPINE 30 MG: 30 TABLET, EXTENDED RELEASE ORAL at 10:07

## 2025-06-09 RX ADMIN — ASPIRIN 81 MG 81 MG: 81 TABLET ORAL at 10:10

## 2025-06-09 RX ADMIN — SEVELAMER HYDROCHLORIDE 1600 MG: 800 TABLET, FILM COATED ORAL at 10:13

## 2025-06-09 RX ADMIN — Medication 2.5 MG: at 21:47

## 2025-06-09 RX ADMIN — HEPARIN SODIUM 5000 UNITS: 5000 INJECTION INTRAVENOUS; SUBCUTANEOUS at 10:09

## 2025-06-09 RX ADMIN — SEVELAMER HYDROCHLORIDE 1600 MG: 800 TABLET, FILM COATED ORAL at 17:39

## 2025-06-09 RX ADMIN — HEPARIN SODIUM 5000 UNITS: 5000 INJECTION INTRAVENOUS; SUBCUTANEOUS at 21:50

## 2025-06-09 RX ADMIN — METOPROLOL SUCCINATE 25 MG: 25 TABLET, EXTENDED RELEASE ORAL at 10:09

## 2025-06-09 RX ADMIN — METOPROLOL SUCCINATE 25 MG: 25 TABLET, EXTENDED RELEASE ORAL at 21:51

## 2025-06-09 NOTE — ASSESSMENT & PLAN NOTE
Hgb is below goal at 9.9 g/dL, stable  He is on Mircera 150 mcg every 2 weeks and Venofer.  Continue to monitor hemoglobin.  No need for SURINDER for now

## 2025-06-09 NOTE — PLAN OF CARE
Problem: METABOLIC, FLUID AND ELECTROLYTES - ADULT  Goal: Electrolytes maintained within normal limits  Description: INTERVENTIONS:  - Monitor labs and assess patient for signs and symptoms of electrolyte imbalances  - Administer electrolyte replacement as ordered  - Monitor response to electrolyte replacements, including repeat lab results as appropriate  - Instruct patient on fluid and nutrition as appropriate  Outcome: Progressing  Goal: Fluid balance maintained  Description: INTERVENTIONS:  - Monitor labs   - Monitor I/O and WT  - Instruct patient on fluid and nutrition as appropriate  - Assess for signs & symptoms of volume excess or deficit  Outcome: Progressing     Problem: DISCHARGE PLANNING  Goal: Discharge to home or other facility with appropriate resources  Description: INTERVENTIONS:  - Identify barriers to discharge w/patient and caregiver  - Arrange for needed discharge resources and transportation as appropriate  - Identify discharge learning needs (meds, wound care, etc.)  - Arrange for interpretive services to assist at discharge as needed  - Refer to Case Management Department for coordinating discharge planning if the patient needs post-hospital services based on physician/advanced practitioner order or complex needs related to functional status, cognitive ability, or social support system  Outcome: Progressing     Problem: Knowledge Deficit  Goal: Patient/family/caregiver demonstrates understanding of disease process, treatment plan, medications, and discharge instructions  Description: Complete learning assessment and assess knowledge base.  Interventions:  - Provide teaching at level of understanding  - Provide teaching via preferred learning methods  Outcome: Progressing     Problem: Nutrition/Hydration-ADULT  Goal: Nutrient/Hydration intake appropriate for improving, restoring or maintaining nutritional needs  Description: Monitor and assess patient's nutrition/hydration status for  malnutrition. Collaborate with interdisciplinary team and initiate plan and interventions as ordered.  Monitor patient's weight and dietary intake as ordered or per policy. Utilize nutrition screening tool and intervene as necessary. Determine patient's food preferences and provide high-protein, high-caloric foods as appropriate.     INTERVENTIONS:  - Monitor oral intake, urinary output, labs, and treatment plans  - Assess nutrition and hydration status and recommend course of action  - Evaluate amount of meals eaten  - Assist patient with eating if necessary   - Allow adequate time for meals  - Recommend/ encourage appropriate diets, oral nutritional supplements, and vitamin/mineral supplements  - Order, calculate, and assess calorie counts as needed  - Recommend, monitor, and adjust tube feedings and TPN/PPN based on assessed needs  - Assess need for intravenous fluids  - Provide specific nutrition/hydration education as appropriate  - Include patient/family/caregiver in decisions related to nutrition  Outcome: Progressing

## 2025-06-09 NOTE — ASSESSMENT & PLAN NOTE
10/9/24 Echo EF 60-65%, G1DD  Volume status has improved with UF with HD, decreased target weight.  Continue Lasix on discharge

## 2025-06-09 NOTE — ASSESSMENT & PLAN NOTE
Felt to be due to fluid overload + infectious etiology.  Chest x-ray was suggestive of pulmonary edema. COVID positive on admission.   Of note, troponin levels elevated and cards recommended cardiac cath which patient is refusing.   Continue management of COVID per primary team

## 2025-06-09 NOTE — PROGRESS NOTES
Progress Note - Hospitalist   Name: Naresh Carpio 64 y.o. male I MRN: [id number]  Unit/Bed#: [address] [place] [address] Date of Admission: 6/4/2025   Date of Service: 6/9/2025 I Hospital Day: 4     Assessment & Plan  Acute respiratory failure with hypoxia (HCC)  Patient presents from rehab facility due to shortness of breath.  He was noted to be hypoxic to 84% on room air and in respiratory distress by medics  Initially required BiPAP in the ER and currently on 4 L  VBG showed pH of 7.3 with PCO2 of 52 and PO2 of 30  Likely in the setting of volume overload due to noncompliance with dialysis sessions  Chest x-ray appears to be pulmonary edema however official read is pending  Further management with dialysis.  Titrate off oxygen as tolerated  COVID pending    6/7 - Secondary to volume overload from missed hemodialysis.  Continue hemodialysis per recommendations from nephrology.  Patient also found to be positive for COVID-19 which may also be contributing. Overall, respiratory status is improved.    6/8 - Improved. Hemodialysis per nephrology. Plan to complete remdesivir today. Will also discontinue dexamethasone as respiratory status is stabilized and patient is saturating well on room air.    6/9 - Respiratory status remains stable. Continue hemodialysis per recommendations from nephrology.  Patient continues to saturate well on room air.  Status post a course of dexamethasone and remdesivir for COVID.  Elevated troponin  Patient with significant troponin elevation greater than 22,973, now trending down.  No chest pain.  Concern for NSTEMI.  Initially treated with IV heparin now discontinued per recommendations from cardiology.  Patient is adamantly refusing cardiac catheterization as recommended by cardiology.  ESRD (end stage renal disease) (HCC)  Lab Results   Component Value Date    EGFR 10 06/09/2025    EGFR 11 06/09/2025    EGFR 15 06/08/2025    CREATININE 5.15 (H) 06/09/2025    CREATININE 4.84 (H) 06/09/2025     CREATININE 3.93 (H) 06/08/2025     On HD TTS  Only completed 1 hour of dialysis on Tuesday per report given to ER  Continue hemodialysis per recommendations from nephrology  Bilateral leg edema  Bilateral leg edema - L > R, no longer on anticoagulation  Bilateral lower extremity venous duplex negative for DVT.  Essential hypertension  Continue Procardia XL and monitor blood pressures  SIRS (systemic inflammatory response syndrome) ([facility])  As noted by tachycardia, tachypnea due to volume overload.  Lactic acid within normal limits.  No obvious signs of infection, hold off on antibiotics  Blood cultures drawn in the ER with no growth to date  Diabetes mellitus type 2 with peripheral artery disease ([facility])  Lab Results   Component Value Date    HGBA1C 4.8 06/04/2025       Recent Labs     06/08/25  2045 06/09/25  0747 06/09/25  1120 06/09/25  1547   POCGLU 129 114 108 132       (P) [id number] patient on Accu-Cheks with SSI. A1c 4.8.    Paroxysmal atrial fibrillation (HCC)  Continue Procardia XL.  Per recent discharge summary, patient was refusing Eliquis due to his recent history of retroperitoneal hematoma in April 2025  Volume overload secondary to noncompliance with hemodialysis  Wt Readings from Last 3 Encounters:   06/07/25 89.7 kg (197 lb 12 oz)   05/13/25 92.6 kg (204 lb 3.2 oz)   05/05/25 96.6 kg (213 lb)     Patient clinically appears volume overloaded from not completing dialysis session  Received 80 mg of IV Lasix in the ER  Hemodialysis per nephrology  Anemia in ESRD (end-stage renal disease)  ([facility])  Hemoglobin 9.9. Will continue to monitor.  L2 vertebral fracture ([facility])  Per prior note, patient to utilize LSO brace  PT/OT while here  Chronic kidney disease-mineral and bone disorder  Lab Results   Component Value Date    EGFR 10 06/09/2025    EGFR 11 06/09/2025    EGFR 15 06/08/2025    CREATININE 5.15 (H) 06/09/2025    CREATININE 4.84 (H) 06/09/2025    CREATININE 3.93 (H) 06/08/2025      Proliferative diabetic retinopathy of both eyes with macular edema associated with type 2 diabetes mellitus (HCC)    Patient has been following up with ophthalmology in the outpatient setting.  Patient underwent intravitreal Avastin treatment with Dr. Zapata on May 30th. Patient also sees Dr. Chaudhry. Spoke with Dr. Chaudhry who advised that patient should follow up with  upon discharge.  COVID-19  Plan to complete remdesivir today. Will also discontinue dexamethasone as respiratory status is stabilized and patient is saturating well on room air.  Hyponatremia  Sodium 128. Hyponatremia secondary to impaired free water excretion. Hemodialysis per nephrology.  Metabolic encephalopathy  Patient more confused overnight. CT head did not reveal any acute findings but did redemonstrate a density in the right globe which may be a sequela of retinal detachment. Will continue to monitor.  VTE Pharmacologic Prophylaxis:   Heparin prophylaxis    Mobility:   Basic Mobility Inpatient Raw Score: 16  -[facility] Goal: 5: Stand one or more mins  -HLM Achieved: 2: Bed activities/Dependent transfer    Patient Centered Rounds: I performed bedside rounds with nursing staff today.     Education and discussions with Patient/Family: Discussed with patient's sister (Alicja) over the phone.    Current Length of Stay: 4 day(s)  Current Patient Status: Inpatient   Certification Statement: The patient will continue to require additional inpatient hospital stay due to above.  Discharge Plan: TBD    Code Status: Level 1 - Full Code    Subjective   Patient was seen and examined at bedside. Patient confused overnight. Slowly improving this morning.    Objective :  Temp:  [97.5 °F (36.4 °C)-98.4 °F (36.9 °C)] 97.5 °F (36.4 °C)  HR:  [61-72] 64  BP: (155-173)/(71-81) 157/71  Resp:  [16-20] 19  SpO2:  [94 %-98 %] 96 %    Body mass index is 26.09 kg/m².     Input and Output Summary (last 24 hours):     Intake/Output Summary (Last 24  hours) at 6/9/2025 1702  Last data filed at 6/8/2025 1801  Gross per 24 hour   Intake 240 ml   Output --   Net 240 ml       Physical Exam  HENT:      Head: Normocephalic.      Mouth/Throat:      Mouth: Mucous membranes are moist.     Eyes:      Extraocular Movements: Extraocular movements intact.       Cardiovascular:      Rate and Rhythm: Normal rate.   Pulmonary:      Effort: Pulmonary effort is normal. No respiratory distress.      Comments: Decreased in the bases  Abdominal:      Palpations: Abdomen is soft.      Tenderness: There is no abdominal tenderness.     Musculoskeletal:      Comments: Bilateral lower extremity edema     Skin:     General: Skin is warm.     Neurological:      Mental Status: He is alert.      Comments: Confused   Psychiatric:         Mood and Affect: Mood normal.       Lines/Drains:  Lines/Drains/Airways       Active Status       Name Placement date Placement time Site Days    HD Permanent Double Catheter 08/30/23  --  Internal jugular  649                  Telemetry:  Telemetry Orders (From admission, onward)               24 Hour Telemetry Monitoring  Continuous x 24 Hours (Telem)        Expiring   Question:  Reason for 24 Hour Telemetry  Answer:  Metabolic/electrolyte disturbance with high probability of dysrhythmia. K level <3 or >6 OR KCL infusion >10mEq/hr                  Lab Results: I have reviewed the following results:   Results from last 7 days   Lab Units 06/09/25  0438 06/06/25  0518 06/05/25  0343   WBC Thousand/uL 9.86   < > 3.60*   HEMOGLOBIN g/dL 9.9*   < > 8.8*   HEMATOCRIT % 31.3*   < > 28.6*   PLATELETS Thousands/uL 192   < > 184   SEGS PCT %  --   --  75   LYMPHO PCT %  --   --  12*   MONO PCT %  --   --  12   EOS PCT %  --   --  0    < > = values in this interval not displayed.     Results from last 7 days   Lab Units 06/09/25  0438 06/09/25  0032   SODIUM mmol/L 128* 126*   POTASSIUM mmol/L 4.3 4.5   CHLORIDE mmol/L 92* 90*   CO2 mmol/L 25 23   BUN mg/dL 53* 51*    CREATININE mg/dL 5.15* 4.84*   ANION GAP mmol/L 11 13   CALCIUM mg/dL 8.7 8.7   ALBUMIN g/dL  --  3.8   TOTAL BILIRUBIN mg/dL  --  0.47   ALK PHOS U/L  --  85   ALT U/L  --  12   AST U/L  --  16   GLUCOSE RANDOM mg/dL 113 120     Results from last 7 days   Lab Units 06/04/25  0538   INR  1.12     Results from last 7 days   Lab Units 06/09/25  1547 06/09/25  1120 06/09/25  0747 06/08/25  2045 06/08/25  1618 06/08/25  1054 06/08/25  0704 06/07/25  2025 06/07/25  1541 06/07/25  1117 06/07/25  0745 06/06/25 2013   POC GLUCOSE mg/dl 132 108 114 129 224* 174* 95 128 202* 114 124 166*     Results from last 7 days   Lab Units 06/04/25 2011   HEMOGLOBIN A1C % 4.8     Results from last 7 days   Lab Units 06/04/25  0359   LACTIC ACID mmol/L 1.8     Recent Cultures (last 7 days):   Results from last 7 days   Lab Units 06/04/25  0538 06/04/25  0359   BLOOD CULTURE  No Growth After 5 Days. No Growth After 5 Days.     Last 24 Hours Medication List:     Current Facility-Administered Medications:     acetaminophen (TYLENOL) tablet 650 mg, Q6H PRN    allopurinol (ZYLOPRIM) tablet 100 mg, Once per day on Tuesday Thursday Saturday    aspirin chewable tablet 81 mg, Daily    atorvastatin (LIPITOR) tablet 80 mg, Daily With Dinner    calcium carbonate (TUMS) chewable tablet 500 mg, BID PRN    famotidine (PEPCID) tablet 20 mg, HS    gabapentin (NEURONTIN) capsule 100 mg, Once per day on Monday Wednesday Friday    heparin (porcine) subcutaneous injection 5,000 Units, Q12H KEMAL    HYDROmorphone HCl (DILAUDID) injection 0.2 mg, Q4H PRN    insulin lispro (HumALOG/ADMELOG) 100 units/mL subcutaneous injection 1-5 Units, TID AC **AND** Fingerstick Glucose (POCT), TID AC    ipratropium-albuterol (DUO-NEB) 0.5-2.5 mg/3 mL inhalation solution 3 mL, Q4H PRN    lidocaine (LIDODERM) 5 % patch 1 patch, Daily    melatonin tablet 3 mg, HS PRN    metoprolol succinate (TOPROL-XL) 24 hr tablet 25 mg, Q12H KMEAL    NIFEdipine (PROCARDIA XL) 24 hr tablet 30  mg, Daily    oxyCODONE (ROXICODONE) split tablet 2.5 mg, Q8H PRN    senna (SENOKOT) tablet 8.6 mg, Daily With Lunch    sevelamer (RENAGEL) tablet 1,600 mg, TID With Meals    Administrative Statements   Today, Patient Was Seen By: Milan Ruiz MD    **Please Note: This note may have been constructed using a voice recognition system.**

## 2025-06-09 NOTE — NURSING NOTE
Pt has been more confused with since I came onto shift at 1845 tonight. Pt complaining of ongoing pain in his left foot- received IV pain medication from off-going day shift RN. I've administered PO tylenol 650 mg and PO Oxycodone 2.5 mg.   Pt started to exhibit more signs of delirium and got aggressive with PCA Eduardo and myself. CURLY Dick and Doctor was notified. Order was placed for IM Zyprexa 2.5 mg. Dose was administered with Pt's bedtime medications. Medication was ineffective. Doctor notified at approx. 2245, came up to unit and spoke with me, observed pt and placed a 1 time order for 5mg Zyprexa. Dose administered at 2315.

## 2025-06-09 NOTE — PROGRESS NOTES
Progress Note - Nephrology   Name: Naresh Carpio 64 y.o. male I MRN: [id number]  Unit/Bed#: [address] [place] [address] I Date of Admission: 6/4/2025   Date of Service: 6/9/2025 I Hospital Day: 4    Assessment & Plan  ESRD (end stage renal disease) ([facility])  [facility] TTS  Access: R IJ permcath  EDW 94.7 kg.  Last postdialysis weight was 89.7 kg.  Decrease target weight to 89.5 kg and will continue to challenge  Had urgent HD treatment on Wednesday, 6/4/25, due to hyperkalemia and fluid overload.  Last HD on Saturday, 6/7  No urgent indication for dialysis today    Acute respiratory failure with hypoxia ([facility])  Felt to be due to fluid overload + infectious etiology.  Chest x-ray was suggestive of pulmonary edema. COVID positive on admission.   Of note, troponin levels elevated and cards recommended cardiac cath which patient is refusing.   Continue management of COVID per primary team    Essential hypertension  BP is slightly elevated.  Home Rx: Nifedipine 30 mg daily, furosemide 20 mg twice a day.  Current Rx: Metoprolol succ 25 mg BID.   Metoprolol added by cards due to NSTEMI.   Started on nifedipine 30 mg daily due to elevated blood pressure.  Continue UF with HD    Elevated troponin  Cardiology consulted.  Patient is refusing cardiac catheterization.      Volume overload secondary to noncompliance with hemodialysis  10/9/24 Echo EF 60-65%, G1DD  Volume status has improved with UF with HD, decreased target weight.  Continue Lasix on discharge    Anemia in ESRD (end-stage renal disease)  ([facility])  Hgb is below goal at 9.9 g/dL, stable  He is on Mircera 150 mcg every 2 weeks and Venofer.  Continue to monitor hemoglobin.  No need for SURINDER for now    Chronic kidney disease-mineral and bone disorder  He is not on calcitriol in the outpatient setting.  Home Rx: Sevelamer.   Continue Sevelamer 1600 mg TID and low phos diet. Phos at goal.  Last phosphorus was 3.5    Bilateral leg edema  This is chronic and stable.  Continue UF with  HD, edema has improved with decreasing target weight.    COVID-19  On remdesivir and dexamethasone per primary service.  Hyponatremia  -Due to impaired free water excretion, sodium was 128 continue UF with HD, placed on fluid restriction.    I have reviewed the nephrology recommendations including plan for next hemodialysis treatment tomorrow, with primary team, and we are in agreement with renal plan including the information outlined above.     Subjective   No new complaints.  Patient is intermittently confused as per my discussion with nursing staff.  No chest pain or shortness of breath, no nausea vomiting    Objective :  Temp:  [98 °F (36.7 °C)-98.4 °F (36.9 °C)] 98 °F (36.7 °C)  HR:  [61-72] 71  BP: (155-173)/(75-81) 160/75  Resp:  [16-20] 16  SpO2:  [94 %-98 %] 95 %    Current Weight: Weight - Scale: 89.7 kg (197 lb 12 oz)  First Weight: Weight - Scale: 90.8 kg (200 lb 2.8 oz)  I/O         06/07 0701  06/08 0700 06/08 0701  06/09 0700 06/09 0701  06/10 0700    P.O.  880     I.V. (mL/kg) 500 (5.6)      Total Intake(mL/kg) 500 (5.6) 880 (9.8)     Urine (mL/kg/hr) 20 (0)      Other 3000      Total Output 3020      Net -2520 +880            Unmeasured Urine Occurrence  6 x     Unmeasured Stool Occurrence  1 x           Physical Exam   General:  Ill looking, awake.  Eyes: Conjunctivae pink,  Sclera anicteric  ENT: lips and mucous membranes moist  Neck: supple   Chest: Clear to Auscultation both lungs,  no crackles, ronchus or wheezing.  CVS: S1 & S2 present, normal rate, regular rhythm, no murmur.  Abdomen: soft, non-tender, non-distended, Bowel sounds normoactive  Extremities: no edema of  legs.  Lower extremity skin wrinkled  Skin: no rash  Neuro: awake, alert, oriented x 3, mentions that it is 2025 but said it is July instead of June  Psych: Mood and affect appears anxious, patient is unrestrained  Medications:  Current Medications[1]      Lab Results: I have reviewed the following results:  Results from last 7  "days   Lab Units 06/09/25  0438 06/09/25  0032 06/08/25  0519 06/07/25  0452 06/06/25  0518 06/05/25  0343 06/04/25  0359   WBC Thousand/uL 9.86 7.26 6.13 4.18* 4.13* 3.60* 8.53   HEMOGLOBIN g/dL 9.9* 9.2* 9.0* 8.9* 8.7* 8.8* 9.6*   HEMATOCRIT % 31.3* 28.9* 28.8* 28.9* 28.4* 28.6* 31.8*   PLATELETS Thousands/uL 192 192 180 169 196 184 218   POTASSIUM mmol/L 4.3 4.5 4.3 4.3 4.3 4.7 5.4*   CHLORIDE mmol/L 92* 90* 91* 93* 95* 94* 91*   CO2 mmol/L 25 23 24 23 27 30 27   BUN mg/dL 53* 51* 38* 51* 34* 26* 40*   CREATININE mg/dL 5.15* 4.84* 3.93* 5.22* 4.11* 4.89* 7.00*   CALCIUM mg/dL 8.7 8.7 8.5 8.5 8.4 8.3* 9.0   MAGNESIUM mg/dL 1.9 1.9 1.9 2.1 2.0 2.0  --    PHOSPHORUS mg/dL  --  3.5  --   --   --  4.2*  --    ALBUMIN g/dL  --  3.8  --   --   --  3.3*  --        Administrative Statements     Portions of the record may have been created with voice recognition software. Occasional wrong word or \"sound a like\" substitutions may have occurred due to the inherent limitations of voice recognition software. Read the chart carefully and recognize, using context, where substitutions have occurred.If you have any questions, please contact the dictating provider.         [1]   Current Facility-Administered Medications:     acetaminophen (TYLENOL) tablet 650 mg, 650 mg, Oral, Q6H PRN, Lore Revankar, DO, 650 mg at 06/08/25 2055    allopurinol (ZYLOPRIM) tablet 100 mg, 100 mg, Oral, Once per day on Tuesday Thursday Saturday, Lore Revankar, DO, 100 mg at 06/07/25 1537    aspirin chewable tablet 81 mg, 81 mg, Oral, Daily, Lore Revdelar DO, 81 mg at 06/08/25 0816    atorvastatin (LIPITOR) tablet 80 mg, 80 mg, Oral, Daily With Dinner, Lore Silver DO, 80 mg at 06/08/25 1622    calcium carbonate (TUMS) chewable tablet 500 mg, 500 mg, Oral, BID PRN, GRANT Rich, 500 mg at 06/06/25 0113    famotidine (PEPCID) tablet 20 mg, 20 mg, Oral, HS, RAMEZ RichNP, 20 mg at 06/08/25 2102    gabapentin (NEURONTIN) capsule 100 " mg, 100 mg, Oral, Once per day on Monday Wednesday Friday, Milan Ruiz MD    heparin (porcine) subcutaneous injection 5,000 Units, 5,000 Units, Subcutaneous, Q12H [facility], Milan Ruiz MD, 5,000 Units at 06/08/25 2058    HYDROmorphone HCl (DILAUDID) injection 0.2 mg, 0.2 mg, Intravenous, Q4H PRN, GRANT Rich, 0.2 mg at 06/08/25 1946    insulin lispro (HumALOG/ADMELOG) 100 units/mL subcutaneous injection 1-5 Units, 1-5 Units, Subcutaneous, TID AC, 1 Units at 06/08/25 1622 **AND** Fingerstick Glucose (POCT), , , TID AC, Lore Silver DO    ipratropium-albuterol (DUO-NEB) 0.5-2.5 mg/3 mL inhalation solution 3 mL, 3 mL, Nebulization, Q4H PRN, Milan Ruiz MD    lidocaine (LIDODERM) 5 % patch 1 patch, 1 patch, Topical, Daily, Lore Silver DO, 1 patch at 06/06/25 0825    melatonin tablet 3 mg, 3 mg, Oral, HS PRN, GRANT Church, 3 mg at 06/08/25 2056    metoprolol succinate (TOPROL-XL) 24 hr tablet 25 mg, 25 mg, Oral, Q12H KEMAL, GRANT Shell, 25 mg at 06/08/25 2057    oxyCODONE (ROXICODONE) split tablet 2.5 mg, 2.5 mg, Oral, Q8H PRN, GRANT Rich, 2.5 mg at 06/08/25 2229    senna (SENOKOT) tablet 8.6 mg, 8.6 mg, Oral, Daily With Lunch, Lore Silver DO, 8.6 mg at 06/06/25 1151    sevelamer (RENAGEL) tablet 1,600 mg, 1,600 mg, Oral, TID With Meals, Lore Silver DO, 1,600 mg at 06/08/25 1622

## 2025-06-09 NOTE — NURSING NOTE
This PCA and LPN Han entered Pt room 22:30 for VS and medication administration.  Upon entering room Pt noted to be in corner of room by sink.  Multiple attempts by both staff made to redirect Pt to bed or recliner chair.  Pt noted to be in state of delirium and agitated with staff at redirection attempts.  Once back in recliner chair Pt educated by both staff that he is a fall risk and to call for help if needing to get out of chair.  Pt ignored staff and got up from chair despite education.  Pt currently monitored on baby monitor.  Will continue to frequently check on Pt.

## 2025-06-09 NOTE — ASSESSMENT & PLAN NOTE
Lab Results   Component Value Date    EGFR 10 06/09/2025    EGFR 11 06/09/2025    EGFR 15 06/08/2025    CREATININE 5.15 (H) 06/09/2025    CREATININE 4.84 (H) 06/09/2025    CREATININE 3.93 (H) 06/08/2025     On HD TTS  Only completed 1 hour of dialysis on Tuesday per report given to ER  Continue hemodialysis per recommendations from nephrology

## 2025-06-09 NOTE — NURSING NOTE
"Date of Fall: 06/09/25  Observer/Reported time of Fall: 0005  Name of Provider Notified: GRANT Ibrahim  Time Provider Notified: 0012  Assessment of Patient Injury: Change in mental status; No injury noted  Post Fall Interventions: Fall risk precautions implemented/maitained; Circumstances of fall reviewed and documented; Neuro checks intiated if indicated; Need for additional safety measures intiated if necessary      Patient was administered medications at bedtime- Zyprexa 2.5mg, Melatonin 3mg, Tylenol 650mg along with his usual bedtime medications- Metoprolol, Pepcid, Heparin. The Zyprexa and Melatonin were ineffective- GRANT Ibrahim saw patient, observed worsening confusion and delirium- ordered 5mg Zyprexa IM along with blood work. After administering medication I attempted to redirect patient into bed or to sit in the recliner. Patient was repeatedly combative and aggressive with staff refusing to sit in recliner or get into bed.     Shortly after administering Zyprexa 5mg IM, patient found sitting on the floor between bathrrom door and room entrance door. When asked what happened patient stated, \"I tripped on the rug.\" Patients walker was approximately 5 feet away and out of reach. CURLY De Leon and I assisted the patient up after making sure there were no injuries and assisted him to the bed. Patient still refusing to lay down in the bed. SUSANNA BURNS notified and came to see patient. Ordered CT head STAT, blood work. NV restraints, virtual observation. Patient unhappy about being restrained, yelling for help, calling for his mother, etc.   "

## 2025-06-09 NOTE — QUICK NOTE
"Reported by nurse and nursing staff patient agitation, restlessness and unable to redirect.  Per nurse report, patient leaving the room, unable to be redirected, despite meeting all his needs.  Nurse report the patient had a similar episode the night before.  Nursing also reports patient has become agitated and restless.  Delirium precautions along with neurochecks were placed, per nurse patient is positive for CAM delirium.  A one-time dose of Zyprexa 2.5 was administrated, intervention was ineffective.  At bedside the patient is noticeably agitated, pacing around the room, unable to redirect.  A second dose of Zyprexa 5 mg IM ordered.    Obtain VBG, CBC, CMP, folate, B12, ammonia mag and Phos to rule out any other etiologies,  as of now etiology of restlessness and agitation could be related to hospital delirium.  Continue with current plan of care, delirium and fall precautions.    Addendum 12:44 AM    Around 12:10 AM I was contacted by nurse to report patient was found on the floor between bathroom door and exit door in his room.  Per nurse, patient placed himself on the floor, ascending baby monitor.  Mr. Carpio reports \"he tripped on the rug.\"  At bedside, patient is hemodynamically stable, mildly agitated, reports he needs to \"talk to his ex-wife about the store unit,\" throw his LSO brace in the garbage can.  On assessment the patient is alert and oriented x 3, he follow commands but noted confusion at times and incoherent conversations, insist in addressing something in the \"store unit.\"    Due to increase of restlessness and failure with chemical restraints and patient's safety, soft wrist restraints bilaterally has been ordered along with a virtual monitoring.  Due to change in mental status, CT head stat without contrast has also been ordered.  Will review blood work.  Continue with plan of care and close monitoring.      "

## 2025-06-09 NOTE — ASSESSMENT & PLAN NOTE
BP is slightly elevated.  Home Rx: Nifedipine 30 mg daily, furosemide 20 mg twice a day.  Current Rx: Metoprolol succ 25 mg BID.   Metoprolol added by cards due to NSTEMI.   Started on nifedipine 30 mg daily due to elevated blood pressure.  Continue UF with HD

## 2025-06-09 NOTE — ASSESSMENT & PLAN NOTE
Patient more confused overnight. CT head did not reveal any acute findings but did redemonstrate a density in the right globe which may be a sequela of retinal detachment. Will continue to monitor.

## 2025-06-09 NOTE — ASSESSMENT & PLAN NOTE
[facility] TTS  Access: R IJ permcath  EDW 94.7 kg.  Last postdialysis weight was 89.7 kg.  Decrease target weight to 89.5 kg and will continue to challenge  Had urgent HD treatment on Wednesday, 6/4/25, due to hyperkalemia and fluid overload.  Last HD on Saturday, 6/7  No urgent indication for dialysis today

## 2025-06-09 NOTE — ASSESSMENT & PLAN NOTE
Lab Results   Component Value Date    HGBA1C 4.8 06/04/2025       Recent Labs     06/08/25  2045 06/09/25  0747 06/09/25  1120 06/09/25  1547   POCGLU 129 114 108 132       (P) [id number] patient on Accu-Cheks with SSI. A1c 4.8.

## 2025-06-09 NOTE — ASSESSMENT & PLAN NOTE
He is not on calcitriol in the outpatient setting.  Home Rx: Sevelamer.   Continue Sevelamer 1600 mg TID and low phos diet. Phos at goal.  Last phosphorus was 3.5

## 2025-06-09 NOTE — ASSESSMENT & PLAN NOTE
Patient presents from rehab facility due to shortness of breath.  He was noted to be hypoxic to 84% on room air and in respiratory distress by medics  Initially required BiPAP in the ER and currently on 4 L  VBG showed pH of 7.3 with PCO2 of 52 and PO2 of 30  Likely in the setting of volume overload due to noncompliance with dialysis sessions  Chest x-ray appears to be pulmonary edema however official read is pending  Further management with dialysis.  Titrate off oxygen as tolerated  COVID pending    6/7 - Secondary to volume overload from missed hemodialysis.  Continue hemodialysis per recommendations from nephrology.  Patient also found to be positive for COVID-19 which may also be contributing. Overall, respiratory status is improved.    6/8 - Improved. Hemodialysis per nephrology. Plan to complete remdesivir today. Will also discontinue dexamethasone as respiratory status is stabilized and patient is saturating well on room air.    6/9 - Respiratory status remains stable. Continue hemodialysis per recommendations from nephrology.  Patient continues to saturate well on room air.  Status post a course of dexamethasone and remdesivir for COVID.

## 2025-06-09 NOTE — ASSESSMENT & PLAN NOTE
This is chronic and stable.  Continue UF with HD, edema has improved with decreasing target weight.

## 2025-06-09 NOTE — ASSESSMENT & PLAN NOTE
-Due to impaired free water excretion, sodium was 128 continue UF with HD, placed on fluid restriction.

## 2025-06-09 NOTE — ASSESSMENT & PLAN NOTE
Lab Results   Component Value Date    EGFR 10 06/09/2025    EGFR 11 06/09/2025    EGFR 15 06/08/2025    CREATININE 5.15 (H) 06/09/2025    CREATININE 4.84 (H) 06/09/2025    CREATININE 3.93 (H) 06/08/2025

## 2025-06-10 ENCOUNTER — APPOINTMENT (INPATIENT)
Dept: DIALYSIS | Facility: HOSPITAL | Age: 65
DRG: 177 | End: 2025-06-10
Attending: INTERNAL MEDICINE
Payer: MEDICARE

## 2025-06-10 LAB
ANION GAP SERPL CALCULATED.3IONS-SCNC: 12 MMOL/L (ref 4–13)
BUN SERPL-MCNC: 67 MG/DL (ref 5–25)
CALCIUM SERPL-MCNC: 7.9 MG/DL (ref 8.4–10.2)
CHLORIDE SERPL-SCNC: 93 MMOL/L (ref 96–108)
CO2 SERPL-SCNC: 22 MMOL/L (ref 21–32)
CREAT SERPL-MCNC: 6.71 MG/DL (ref 0.6–1.3)
ERYTHROCYTE [DISTWIDTH] IN BLOOD BY AUTOMATED COUNT: 15.2 % (ref 11.6–15.1)
GFR SERPL CREATININE-BSD FRML MDRD: 7 ML/MIN/1.73SQ M
GLUCOSE SERPL-MCNC: 101 MG/DL (ref 65–140)
GLUCOSE SERPL-MCNC: 116 MG/DL (ref 65–140)
GLUCOSE SERPL-MCNC: 139 MG/DL (ref 65–140)
GLUCOSE SERPL-MCNC: 93 MG/DL (ref 65–140)
GLUCOSE SERPL-MCNC: 94 MG/DL (ref 65–140)
HCT VFR BLD AUTO: 27.2 % (ref 36.5–49.3)
HGB BLD-MCNC: 8.7 G/DL (ref 12–17)
MAGNESIUM SERPL-MCNC: 2 MG/DL (ref 1.9–2.7)
MCH RBC QN AUTO: 29.7 PG (ref 26.8–34.3)
MCHC RBC AUTO-ENTMCNC: 32 G/DL (ref 31.4–37.4)
MCV RBC AUTO: 93 FL (ref 82–98)
PLATELET # BLD AUTO: 181 THOUSANDS/UL (ref 149–390)
PMV BLD AUTO: 9.9 FL (ref 8.9–12.7)
POTASSIUM SERPL-SCNC: 4.5 MMOL/L (ref 3.5–5.3)
RBC # BLD AUTO: 2.93 MILLION/UL (ref 3.88–5.62)
SODIUM SERPL-SCNC: 127 MMOL/L (ref 135–147)
WBC # BLD AUTO: 8.57 THOUSAND/UL (ref 4.31–10.16)

## 2025-06-10 PROCEDURE — 82948 REAGENT STRIP/BLOOD GLUCOSE: CPT

## 2025-06-10 PROCEDURE — 99232 SBSQ HOSP IP/OBS MODERATE 35: CPT | Performed by: INTERNAL MEDICINE

## 2025-06-10 PROCEDURE — 85027 COMPLETE CBC AUTOMATED: CPT | Performed by: FAMILY MEDICINE

## 2025-06-10 PROCEDURE — 80048 BASIC METABOLIC PNL TOTAL CA: CPT | Performed by: FAMILY MEDICINE

## 2025-06-10 PROCEDURE — 83735 ASSAY OF MAGNESIUM: CPT | Performed by: FAMILY MEDICINE

## 2025-06-10 PROCEDURE — 94660 CPAP INITIATION&MGMT: CPT

## 2025-06-10 RX ORDER — HYDRALAZINE HYDROCHLORIDE 20 MG/ML
5 INJECTION INTRAMUSCULAR; INTRAVENOUS EVERY 6 HOURS PRN
Status: DISCONTINUED | OUTPATIENT
Start: 2025-06-10 | End: 2025-06-13 | Stop reason: HOSPADM

## 2025-06-10 RX ADMIN — HYDROMORPHONE HYDROCHLORIDE 0.2 MG: 0.2 INJECTION, SOLUTION INTRAMUSCULAR; INTRAVENOUS; SUBCUTANEOUS at 20:46

## 2025-06-10 RX ADMIN — SEVELAMER HYDROCHLORIDE 1600 MG: 800 TABLET, FILM COATED ORAL at 08:13

## 2025-06-10 RX ADMIN — HYDROMORPHONE HYDROCHLORIDE 0.2 MG: 0.2 INJECTION, SOLUTION INTRAMUSCULAR; INTRAVENOUS; SUBCUTANEOUS at 15:31

## 2025-06-10 RX ADMIN — ATORVASTATIN CALCIUM 80 MG: 80 TABLET, FILM COATED ORAL at 15:31

## 2025-06-10 RX ADMIN — NIFEDIPINE 30 MG: 30 TABLET, EXTENDED RELEASE ORAL at 08:18

## 2025-06-10 RX ADMIN — ASPIRIN 81 MG 81 MG: 81 TABLET ORAL at 08:14

## 2025-06-10 RX ADMIN — HYDROMORPHONE HYDROCHLORIDE 0.2 MG: 0.2 INJECTION, SOLUTION INTRAMUSCULAR; INTRAVENOUS; SUBCUTANEOUS at 09:44

## 2025-06-10 RX ADMIN — Medication 2.5 MG: at 19:44

## 2025-06-10 RX ADMIN — METOPROLOL SUCCINATE 25 MG: 25 TABLET, EXTENDED RELEASE ORAL at 20:00

## 2025-06-10 RX ADMIN — SENNOSIDES 8.6 MG: 8.6 TABLET, FILM COATED ORAL at 13:00

## 2025-06-10 RX ADMIN — Medication 2.5 MG: at 11:42

## 2025-06-10 RX ADMIN — METOPROLOL SUCCINATE 25 MG: 25 TABLET, EXTENDED RELEASE ORAL at 08:13

## 2025-06-10 RX ADMIN — HEPARIN SODIUM 5000 UNITS: 5000 INJECTION INTRAVENOUS; SUBCUTANEOUS at 20:00

## 2025-06-10 RX ADMIN — SEVELAMER HYDROCHLORIDE 1600 MG: 800 TABLET, FILM COATED ORAL at 13:00

## 2025-06-10 RX ADMIN — LIDOCAINE 5% 1 PATCH: 700 PATCH TOPICAL at 08:14

## 2025-06-10 RX ADMIN — ALLOPURINOL 100 MG: 100 TABLET ORAL at 16:00

## 2025-06-10 RX ADMIN — FAMOTIDINE 20 MG: 20 TABLET, FILM COATED ORAL at 22:24

## 2025-06-10 RX ADMIN — HYDROMORPHONE HYDROCHLORIDE 0.2 MG: 0.2 INJECTION, SOLUTION INTRAMUSCULAR; INTRAVENOUS; SUBCUTANEOUS at 02:08

## 2025-06-10 RX ADMIN — HEPARIN SODIUM 5000 UNITS: 5000 INJECTION INTRAVENOUS; SUBCUTANEOUS at 08:14

## 2025-06-10 RX ADMIN — SEVELAMER HYDROCHLORIDE 1600 MG: 800 TABLET, FILM COATED ORAL at 17:30

## 2025-06-10 NOTE — ASSESSMENT & PLAN NOTE
Completed Remdesivir course and discontinue Decadron in the setting of return to baseline room air  Airborne/contact precautions

## 2025-06-10 NOTE — ASSESSMENT & PLAN NOTE
Patient presents from rehab facility due to shortness of breath.  He was noted to be hypoxic to 84% on room air and in respiratory distress by medics  Initially required BiPAP in the ER and currently on 4 L  VBG showed pH of 7.3 with PCO2 of 52 and PO2 of 30  Likely in the setting of volume overload due to noncompliance with dialysis sessions  Chest x-ray appears to be pulmonary edema however official read is pending  Further management with dialysis.  Titrate off oxygen as tolerated  COVID pending    6/7 - Secondary to volume overload from missed hemodialysis.  Continue hemodialysis per recommendations from nephrology.  Patient also found to be positive for COVID-19 which may also be contributing. Overall, respiratory status is improved.  6/8 - Improved. Hemodialysis per nephrology. Plan to complete remdesivir today. Will also discontinue dexamethasone as respiratory status is stabilized and patient is saturating well on room air.  6/9 - Respiratory status remains stable. Continue hemodialysis per recommendations from nephrology.  Patient continues to saturate well on room air.  Status post a course of dexamethasone and remdesivir for COVID.  6/10 - Remains oxygenating on baseline room air.  Off treatment for COVID at this time.  Continue to encourage compliance with outpatient dialysis sessions.  With stability, anticipate discharge in the next 1-2 days.

## 2025-06-10 NOTE — ASSESSMENT & PLAN NOTE
-Due to impaired free water excretion, sodium was 128 on last blood work yesterday, continue UF with HD, continue fluid restriction

## 2025-06-10 NOTE — ASSESSMENT & PLAN NOTE
[facility] TTS  Access: R IJ permcath  EDW 94.7 kg.  Last postdialysis weight was 89.7 kg.  Decrease target weight to 89.5 kg and will continue to challenge  Had urgent HD treatment on Wednesday, 6/4/25, due to hyperkalemia and fluid overload.  Last HD on Saturday, 6/7  Clinically appears euvolemic, plan for intermittent HD today, discussed with dialysis nurse will challenge target weight further.

## 2025-06-10 NOTE — PROGRESS NOTES
Progress Note - Hospitalist   Name: Naresh Carpio 64 y.o. male I MRN: [id number]  Unit/Bed#: [address] [place] [address] I Date of Admission: 6/4/2025   Date of Service: 6/10/2025 I Hospital Day: 5    Assessment & Plan  Acute respiratory failure with hypoxia (HCC)  Patient presents from rehab facility due to shortness of breath.  He was noted to be hypoxic to 84% on room air and in respiratory distress by medics  Initially required BiPAP in the ER and currently on 4 L  VBG showed pH of 7.3 with PCO2 of 52 and PO2 of 30  Likely in the setting of volume overload due to noncompliance with dialysis sessions  Chest x-ray appears to be pulmonary edema however official read is pending  Further management with dialysis.  Titrate off oxygen as tolerated  COVID pending    6/7 - Secondary to volume overload from missed hemodialysis.  Continue hemodialysis per recommendations from nephrology.  Patient also found to be positive for COVID-19 which may also be contributing. Overall, respiratory status is improved.  6/8 - Improved. Hemodialysis per nephrology. Plan to complete remdesivir today. Will also discontinue dexamethasone as respiratory status is stabilized and patient is saturating well on room air.  6/9 - Respiratory status remains stable. Continue hemodialysis per recommendations from nephrology.  Patient continues to saturate well on room air.  Status post a course of dexamethasone and remdesivir for COVID.  6/10 - Remains oxygenating on baseline room air.  Off treatment for COVID at this time.  Continue to encourage compliance with outpatient dialysis sessions.  With stability, anticipate discharge in the next 1-2 days.   Volume overload secondary to noncompliance with hemodialysis  Encourage compliance to outpatient dialysis sessions  Received 80 mg of IV Lasix in the ER for diuresis  Ongoing routine hemodialysis per nephrology  COVID-19  Completed Remdesivir course and discontinue Decadron in the setting of return to baseline  room air  Airborne/contact precautions  Elevated troponin  Patient with significant troponin elevation greater than 22,973, now trending down.  No chest pain.  Concern for NSTEMI.  Initially treated with IV heparin now discontinued per recommendations from cardiology.  Patient is adamantly refusing cardiac catheterization as recommended by cardiology.  ESRD (end stage renal disease) ([facility])  Continue routine hemodialysis per nephrology  On Renagel supplementation  SIRS (systemic inflammatory response syndrome) ([facility])  As noted by tachycardia, tachypnea due to volume overload.  Lactic acid within normal limits.  No obvious signs of infection, hold off on antibiotics  Blood cultures drawn in the ER, with no growth to date  Metabolic encephalopathy  Waxing/waning but generally improved  CT imaging negative for acute intracranial etiology - noted, however, a density in the right lobe, possibly sequela of prior retinal detachment  Essential hypertension  Continue Procardia/Toprol XL - PRN IV Hydralazine on board for BP spikes  Cardiac diet  Paroxysmal atrial fibrillation ([facility])  Rate controlled on Toprol-XL   Per recent discharge summary, patient was refusing Eliquis due to his recent history of retroperitoneal hematoma in April 2025  Bilateral leg edema  Bilateral leg edema - L > R, no longer on anticoagulation  Bilateral lower extremity venous duplex negative for DVT.  Anemia in ESRD (end-stage renal disease)  ([facility])  Hemoglobin stable  L2 vertebral fracture ([facility])  Per prior note, patient to utilize LSO brace  Ongoing PT/OT evaluations  Hyponatremia  Management via dialysis      VTE Pharmacologic Prophylaxis:   Heparin SC    AM-PAC Basic Mobility:  Basic Mobility Inpatient Raw Score: 13  JH-HLM Goal: 4: Move to chair/commode  JH-HLM Achieved: 4: Move to chair/commode  JH-HLM Goal achieved. Continue to encourage appropriate mobility.    Patient Centered Rounds:  I have performed bedside rounds and discussed plan of care with  nursing today.  Discussions with Specialists or Other Care Team Provider:  see above assessments if applicable    Education and Discussions with Family / Patient:  Patient at bedside    Time Spent for Care:  35 minutes. More than 50% of total time spent on counseling and coordination of care, on one or more of the following: performing physical exam; counseling and coordination of care, obtaining or reviewing history, documenting in the medical record, reviewing/ordering tests/medications/procedures, and communicating with other healthcare professionals.    Current Length of Stay: 5 day(s)  Current Patient Status: Inpatient   Certification Statement:  Patient will continue to require additional hospital stay due to assessments as noted above.    Code Status: Level 1 - Full Code      Subjective     Encountered earlier today while being dialyzed.  Complains of some mild generalized pains.  States appetite has improved.      Objective     Vitals:   Temp (24hrs), Av.9 °F (36.6 °C), Min:97.6 °F (36.4 °C), Max:98.1 °F (36.7 °C)    Temp:  [97.6 °F (36.4 °C)-98.1 °F (36.7 °C)] 97.8 °F (36.6 °C)  HR:  [52-66] 66  Resp:  [14-19] 19  BP: (133-171)/(54-74) 154/69  SpO2:  [90 %-99 %] 95 %  Body mass index is 26.09 kg/m².     Input and Output Summary (last 24 hours):       Intake/Output Summary (Last 24 hours) at 6/10/2025 1629  Last data filed at 6/10/2025 1230  Gross per 24 hour   Intake 700 ml   Output 2000 ml   Net -1300 ml       Physical Exam:     GENERAL Weak/fatigued   HEAD   Normocephalic - atraumatic   EYES Nonicteric   MOUTH   Mucosa moist   NECK   Supple - full range of motion   CARDIAC Rate controlled   PULMONARY Diminished but improving bilateral breath sounds without labored respirations at rest   ABDOMEN Nontender/nondistended   MUSCULOSKELETAL   Motor strength/range of motion deconditioned   NEUROLOGIC   Alert/oriented at baseline   PSYCHIATRIC   Mood/affect stable         Labs & Recent Cultures:  Results from  last 7 days   Lab Units 06/10/25  0939 06/06/25  0518 06/05/25  0343   WBC Thousand/uL 8.57   < > 3.60*   HEMOGLOBIN g/dL 8.7*   < > 8.8*   HEMATOCRIT % 27.2*   < > 28.6*   PLATELETS Thousands/uL 181   < > 184   SEGS PCT %  --   --  75   LYMPHO PCT %  --   --  12*   MONO PCT %  --   --  12   EOS PCT %  --   --  0    < > = values in this interval not displayed.     Results from last 7 days   Lab Units 06/10/25  0939 06/09/25  0438 06/09/25  0032   POTASSIUM mmol/L 4.5   < > 4.5   CHLORIDE mmol/L 93*   < > 90*   CO2 mmol/L 22   < > 23   BUN mg/dL 67*   < > 51*   CREATININE mg/dL 6.71*   < > 4.84*   CALCIUM mg/dL 7.9*   < > 8.7   ALK PHOS U/L  --   --  85   ALT U/L  --   --  12   AST U/L  --   --  16    < > = values in this interval not displayed.     Results from last 7 days   Lab Units 06/04/25  0538   INR  1.12     Results from last 7 days   Lab Units 06/10/25  1545 06/10/25  1131 06/10/25  0737 06/09/25  2053 06/09/25  1547 06/09/25  1120 06/09/25  0747 06/08/25  2045 06/08/25  1618 06/08/25  1054 06/08/25  0704 06/07/25  2025   POC GLUCOSE mg/dl 116 94 101 160* 132 108 114 129 224* 174* 95 128     Results from last 7 days   Lab Units 06/04/25 2011   HEMOGLOBIN A1C % 4.8     Results from last 7 days   Lab Units 06/04/25  0359   LACTIC ACID mmol/L 1.8         Results from last 7 days   Lab Units 06/04/25  0538 06/04/25  0359   BLOOD CULTURE  No Growth After 5 Days. No Growth After 5 Days.         Lines/Drains/Telemetry:  Invasive Devices       Peripheral Intravenous Line  Duration             Peripheral IV 06/05/25 Right;Ventral (anterior) Forearm 5 days              Hemodialysis Catheter  Duration             HD Permanent Double Catheter 650 days                    Last 24 Hours Medication List:   Current Facility-Administered Medications   Medication Dose Route Frequency Provider Last Rate    acetaminophen  650 mg Oral Q6H PRN Lore Silver DO      allopurinol  100 mg Oral Once per day on Tuesday Thursday  Saturday Lore Revankar, DO      aspirin  81 mg Oral Daily Lore Revankar, DO      atorvastatin  80 mg Oral Daily With Dinner Lore Revankar, DO      calcium carbonate  500 mg Oral BID PRN Madison Rabago, GRANT      famotidine  20 mg Oral HS Cuiyinicolas Hannarik, CRNP      gabapentin  100 mg Oral Once per day on Monday Wednesday Friday Milan Ruiz MD      heparin (porcine)  5,000 Units Subcutaneous Q12H KEMAL Milan Ruiz MD      HYDROmorphone  0.2 mg Intravenous Q4H PRN Madison Rabago, GRANT      insulin lispro  1-5 Units Subcutaneous TID AC Lore Amadorar, DO      ipratropium-albuterol  3 mL Nebulization Q4H PRN Milan Ruiz MD      lidocaine  1 patch Topical Daily Lore Revankar, DO      melatonin  3 mg Oral HS PRN Love Maguireczek, CRNP      metoprolol succinate  25 mg Oral Q12H [facility] Lynne Ayon, GRANT      NIFEdipine  30 mg Oral Daily Bradley Beaulieu MD      oxyCODONE  2.5 mg Oral Q8H PRN Madison Rabago, GRANT      senna  8.6 mg Oral Daily With Lunch Lore Revdelar, DO      sevelamer  1,600 mg Oral TID With Meals Lore Revdelar, DO                JAMIN GEORGES MD   Hospitalist - [facility] Internal Medicine        ** Please Note:  Documentation is constructed using a voice recognition dictation system.  An occasional wrong word/phrase or “sound-a-like” substitution may have been picked up by dictation device due to the inherent limitations of voice recognition software.  Read the chart carefully and recognize, using reasonable context, where substitutions may have occurred.**

## 2025-06-10 NOTE — ASSESSMENT & PLAN NOTE
Encourage compliance to outpatient dialysis sessions  Received 80 mg of IV Lasix in the ER for diuresis  Ongoing routine hemodialysis per nephrology

## 2025-06-10 NOTE — ASSESSMENT & PLAN NOTE
This is chronic and stable.  Continue UF with HD, edema has improved with decreasing target weight.  Continue UF with HD

## 2025-06-10 NOTE — ASSESSMENT & PLAN NOTE
Rate controlled on Toprol-XL   Per recent discharge summary, patient was refusing Eliquis due to his recent history of retroperitoneal hematoma in April 2025

## 2025-06-10 NOTE — PLAN OF CARE
Problem: METABOLIC, FLUID AND ELECTROLYTES - ADULT  Goal: Electrolytes maintained within normal limits  Description: INTERVENTIONS:  - Monitor labs and assess patient for signs and symptoms of electrolyte imbalances  - Administer electrolyte replacement as ordered  - Monitor response to electrolyte replacements, including repeat lab results as appropriate  - Instruct patient on fluid and nutrition as appropriate  Outcome: Progressing  Goal: Fluid balance maintained  Description: INTERVENTIONS:  - Monitor labs   - Monitor I/O and WT  - Instruct patient on fluid and nutrition as appropriate  - Assess for signs & symptoms of volume excess or deficit  Outcome: Progressing     Problem: Prexisting or High Potential for Compromised Skin Integrity  Goal: Skin integrity is maintained or improved  Description: INTERVENTIONS:  - Identify patients at risk for skin breakdown  - Assess and monitor skin integrity including under and around medical devices   - Assess and monitor nutrition and hydration status  - Monitor labs  - Assess for incontinence   - Turn and reposition patient  - Assist with mobility/ambulation  - Relieve pressure over chente prominences   - Avoid friction and shearing  - Provide appropriate hygiene as needed including keeping skin clean and dry  - Evaluate need for skin moisturizer/barrier cream  - Collaborate with interdisciplinary team  - Patient/family teaching  - Consider wound care consult    Assess:  - Review Praful scale daily  - Clean and moisturize skin every day  - Inspect skin when repositioning, toileting, and assisting with ADLS  - Assess under medical devices such as tubings  every 2hrs  - Assess extremities for adequate circulation and sensation     Bed Management:  - Have minimal linens on bed & keep smooth, unwrinkled  - Change linens as needed when moist or perspiring  - Avoid sitting or lying in one position for more than 2 hours while in bed?Keep HOB at 45degrees   - Toileting:  - Offer  bedside commode  - Assess for incontinence every 2hrs    Activity:  - Mobilize patient 3 times a day  - Encourage activity and walks on unit  - Encourage or provide ROM exercises   - Turn and reposition patient every 2 Hours  - Use appropriate equipment to lift or move patient in bed  - Instruct/ Assist with weight shifting every 2hrs when out of bed in chair  - Consider limitation of chair time 2 hour intervals    Skin Care:  - Avoid use of baby powder, tape, friction and shearing, hot water or constrictive clothing  - Relieve pressure over bony prominences using wedges  - Do not massage red bony areas    Outcome: Progressing     Problem: RESPIRATORY - ADULT  Goal: Achieves optimal ventilation and oxygenation  Description: INTERVENTIONS:  - Assess for changes in respiratory status  - Assess for changes in mentation and behavior  - Position to facilitate oxygenation and minimize respiratory effort  - Oxygen administered by appropriate delivery if ordered  - Initiate smoking cessation education as indicated  - Encourage broncho-pulmonary hygiene including cough, deep breathe, Incentive Spirometry  - Assess the need for suctioning and aspirate as needed  - Assess and instruct to report SOB or any respiratory difficulty  - Respiratory Therapy support as indicated  Outcome: Progressing     Problem: PAIN - ADULT  Goal: Verbalizes/displays adequate comfort level or baseline comfort level  Description: Interventions:  - Encourage patient to monitor pain and request assistance  - Assess pain using appropriate pain scale  - Administer analgesics as ordered based on type and severity of pain and evaluate response  - Implement non-pharmacological measures as appropriate and evaluate response  - Consider cultural and social influences on pain and pain management  - Notify physician/advanced practitioner if interventions unsuccessful or patient reports new pain  - Educate patient/family on pain management process including their  role and importance of  reporting pain   - Provide non-pharmacologic/complimentary pain relief interventions  Outcome: Progressing     Problem: INFECTION - ADULT  Goal: Absence or prevention of progression during hospitalization  Description: INTERVENTIONS:  - Assess and monitor for signs and symptoms of infection  - Monitor lab/diagnostic results  - Monitor all insertion sites, i.e. indwelling lines, tubes, and drains  - Monitor endotracheal if appropriate and nasal secretions for changes in amount and color  - [place] appropriate cooling/warming therapies per order  - Administer medications as ordered  - Instruct and encourage patient and family to use good hand hygiene technique  - Identify and instruct in appropriate isolation precautions for identified infection/condition  Outcome: Progressing     Problem: SAFETY ADULT  Goal: Patient will remain free of falls  Description: INTERVENTIONS:  - Educate patient/family on patient safety including physical limitations  - Instruct patient to call for assistance with activity   - Consider consulting OT/PT to assist with strengthening/mobility based on AM PAC & -HLM score  - Consult OT/PT to assist with strengthening/mobility   - Keep Call bell within reach  - Keep bed low and locked with side rails adjusted as appropriate  - Keep care items and personal belongings within reach  - Initiate and maintain comfort rounds  - Make Fall Risk Sign visible to staff  - Offer Toileting every 2 Hours, in advance of need  - Initiate/Maintain bed alarm    - Apply yellow socks and bracelet for high fall risk patients  - Consider moving patient to room near nurses station  Outcome: Progressing  Goal: Maintain or return to baseline ADL function  Description: INTERVENTIONS:  -  Assess patient's ability to carry out ADLs; assess patient's baseline for ADL function and identify physical deficits which impact ability to perform ADLs (bathing, care of mouth/teeth, toileting, grooming,  dressing, etc.)  - Assess/evaluate cause of self-care deficits   - Assess range of motion  - Assess patient's mobility; develop plan if impaired  - Assess patient's need for assistive devices and provide as appropriate  - Encourage maximum independence but intervene and supervise when necessary  - Involve family in performance of ADLs  - Assess for home care needs following discharge   - Consider OT consult to assist with ADL evaluation and planning for discharge  - Provide patient education as appropriate  - Monitor functional capacity and physical performance, use of AM PAC & JH-HLM   - Monitor gait, balance and fatigue with ambulation    Outcome: Progressing  Goal: Maintains/Returns to pre admission functional level  Description: INTERVENTIONS:  - Perform AM-PAC 6 Click Basic Mobility/ Daily Activity assessment daily.  - Set and communicate daily mobility goal to care team and patient/family/caregiver.   - Collaborate with rehabilitation services on mobility goals if consulted  - Perform Range of Motion 3 times a day.  - Reposition patient every 2 hours.  - Dangle patient 3 times a day  - Stand patient 3 times a day  - Ambulate patient 3 times a day  - Out of bed to chair 3 times a day   - Out of bed for meals 3 times a day  - Out of bed for toileting  - Record patient progress and toleration of activity level   Outcome: Progressing     Problem: DISCHARGE PLANNING  Goal: Discharge to home or other facility with appropriate resources  Description: INTERVENTIONS:  - Identify barriers to discharge w/patient and caregiver  - Arrange for needed discharge resources and transportation as appropriate  - Identify discharge learning needs (meds, wound care, etc.)  - Arrange for interpretive services to assist at discharge as needed  - Refer to Case Management Department for coordinating discharge planning if the patient needs post-hospital services based on physician/advanced practitioner order or complex needs related to  functional status, cognitive ability, or social support system  Outcome: Progressing     Problem: Knowledge Deficit  Goal: Patient/family/caregiver demonstrates understanding of disease process, treatment plan, medications, and discharge instructions  Description: Complete learning assessment and assess knowledge base.  Interventions:  - Provide teaching at level of understanding  - Provide teaching via preferred learning methods  Outcome: Progressing     Problem: Nutrition/Hydration-ADULT  Goal: Nutrient/Hydration intake appropriate for improving, restoring or maintaining nutritional needs  Description: Monitor and assess patient's nutrition/hydration status for malnutrition. Collaborate with interdisciplinary team and initiate plan and interventions as ordered.  Monitor patient's weight and dietary intake as ordered or per policy. Utilize nutrition screening tool and intervene as necessary. Determine patient's food preferences and provide high-protein, high-caloric foods as appropriate.     INTERVENTIONS:  - Monitor oral intake, urinary output, labs, and treatment plans  - Assess nutrition and hydration status and recommend course of action  - Evaluate amount of meals eaten  - Assist patient with eating if necessary   - Allow adequate time for meals  - Recommend/ encourage appropriate diets, oral nutritional supplements, and vitamin/mineral supplements  - Order, calculate, and assess calorie counts as needed  - Recommend, monitor, and adjust tube feedings and TPN/PPN based on assessed needs  - Assess need for intravenous fluids  - Provide specific nutrition/hydration education as appropriate  - Include patient/family/caregiver in decisions related to nutrition  Outcome: Progressing

## 2025-06-10 NOTE — ASSESSMENT & PLAN NOTE
As noted by tachycardia, tachypnea due to volume overload.  Lactic acid within normal limits.  No obvious signs of infection, hold off on antibiotics  Blood cultures drawn in the ER, with no growth to date

## 2025-06-10 NOTE — ASSESSMENT & PLAN NOTE
10/9/24 Echo EF 60-65%, G1DD  Volume status has improved with UF with HD, decreased target weight.  Continue UF with HD

## 2025-06-10 NOTE — PLAN OF CARE
Patient presents for a 4 hour HD session on a 3K2.5Ca bath for a serum potassium of 4.3 mmol/L drawn on 6/9/25 with a net UF goal of 1 L to achieve EDW of 89.5 kg per discussion with Dr. Beaulieu at bedside. New labs drawn pre tx.    Post-Dialysis RN Treatment Note    Blood Pressure:  Pre 144/59 mm/Hg  Post 171/67 mmHg   EDW:  89.5 kg    Weight:  Pre 90.4 kg   Post 88.3 kg   Mode of weight measurement: Bed Scale   Volume Removed:  1000 ml    Treatment duration: 180 minutes    NS given:  No    Treatment shortened Yes, describe: 3 hours due to isolation status   Medications given during Rx: 0.2 mg dilaudid and 2.5 mg oxycodone   Estimated Kt/V:  Not Applicable   Access type: Permacath/TDC   Access Issues: No    Report called to primary nurse:   Yes, Marcelino Schwartz RN     Problem: METABOLIC, FLUID AND ELECTROLYTES - ADULT  Goal: Electrolytes maintained within normal limits  Description: INTERVENTIONS:  - Monitor labs and assess patient for signs and symptoms of electrolyte imbalances  - Administer electrolyte replacement as ordered  - Monitor response to electrolyte replacements, including repeat lab results as appropriate  - Instruct patient on fluid and nutrition as appropriate  Outcome: Progressing  Goal: Fluid balance maintained  Description: INTERVENTIONS:  - Monitor labs   - Monitor I/O and WT  - Instruct patient on fluid and nutrition as appropriate  - Assess for signs & symptoms of volume excess or deficit  Outcome: Progressing

## 2025-06-10 NOTE — ASSESSMENT & PLAN NOTE
Waxing/waning but generally improved  CT imaging negative for acute intracranial etiology - noted, however, a density in the right lobe, possibly sequela of prior retinal detachment

## 2025-06-10 NOTE — PROGRESS NOTES
Progress Note - Nephrology   Name: Naresh Carpio 64 y.o. male I MRN: [id number]  Unit/Bed#: [address] [place] [address] I Date of Admission: 6/4/2025   Date of Service: 6/10/2025 I Hospital Day: 5    Assessment & Plan  ESRD (end stage renal disease) ([facility])  [facility] TTS  Access: R IJ permcath  EDW 94.7 kg.  Last postdialysis weight was 89.7 kg.  Decrease target weight to 89.5 kg and will continue to challenge  Had urgent HD treatment on Wednesday, 6/4/25, due to hyperkalemia and fluid overload.  Last HD on Saturday, 6/7  Clinically appears euvolemic, plan for intermittent HD today, discussed with dialysis nurse will challenge target weight further.       Acute respiratory failure with hypoxia ([facility])  Felt to be due to fluid overload + infectious etiology.  Chest x-ray was suggestive of pulmonary edema. COVID positive on admission.   Of note, troponin levels elevated and cards recommended cardiac cath which patient is refusing.   Continue management of COVID per primary team    Essential hypertension  BP has improved  Home Rx: Nifedipine 30 mg daily, furosemide 20 mg twice a day.  Current Rx: Metoprolol succ 25 mg BID.  Nifedipine 30 mg daily which was started on 6/9.  Metoprolol added by cards due to NSTEMI.   Blood pressure is better today continue current medications continue UF with HD    Elevated troponin  Cardiology consulted.  Patient is refusing cardiac catheterization.      Volume overload secondary to noncompliance with hemodialysis  10/9/24 Echo EF 60-65%, G1DD  Volume status has improved with UF with HD, decreased target weight.  Continue UF with HD  Anemia in ESRD (end-stage renal disease)  ([facility])  Hgb is below goal at 9.9 g/dL, stable  He is on Mircera 150 mcg every 2 weeks and Venofer.  Continue to monitor hemoglobin. no need for SURINDER for now    Chronic kidney disease-mineral and bone disorder  He is not on calcitriol in the outpatient setting.  Home Rx: Sevelamer.   Continue Sevelamer 1600 mg TID and low  phos diet. Phos at goal.  Last phosphorus was 3.5    Bilateral leg edema  This is chronic and stable.  Continue UF with HD, edema has improved with decreasing target weight.  Continue UF with HD    COVID-19  On remdesivir and dexamethasone per primary service.  Hyponatremia  -Due to impaired free water excretion, sodium was 128 on last blood work yesterday, continue UF with HD, continue fluid restriction  Metabolic encephalopathy  Mental status has been fluctuating, appears slightly lethargic    Discussed with primary team regarding plan for intermittent HD treatment today and agree with the plan    Subjective   No new complaints.  No shortness   or chest pain or nausea vomiting    Objective :  Temp:  [97.5 °F (36.4 °C)-98.1 °F (36.7 °C)] 98.1 °F (36.7 °C)  HR:  [52-64] 59  BP: (133-157)/(54-74) 141/54  Resp:  [14-19] 18  SpO2:  [90 %-96 %] 90 %  O2 Device: None (Room air)    Current Weight: Weight - Scale: 89.7 kg (197 lb 12 oz)  First Weight: Weight - Scale: 90.8 kg (200 lb 2.8 oz)  I/O         06/07 0701  06/08 0700 06/08 0701  06/09 0700 06/09 0701  06/10 0700    P.O.  880     I.V. (mL/kg) 500 (5.6)      Total Intake(mL/kg) 500 (5.6) 880 (9.8)     Urine (mL/kg/hr) 20 (0)      Other 3000      Total Output 3020      Net -2520 +880            Unmeasured Urine Occurrence  6 x     Unmeasured Stool Occurrence  1 x           Physical Exam  General:  Ill looking, awake.  Eyes: Conjunctivae pink,  Sclera anicteric  ENT: lips and mucous membranes moist  Neck: supple   Chest: Clear to Auscultation both lungs,  no crackles, ronchus or wheezing.  CVS: S1 & S2 present, normal rate, regular rhythm, no murmur.  Abdomen: soft, non-tender, non-distended, Bowel sounds normoactive  Extremities: no edema of  legs  Skin: no rash  Neuro: awake, alert, follows command.  Not interested in answering questions  Psych: Mood and affect anxious  Medications:  Current Medications[1]      Lab Results: I have reviewed the following  "results:  Results from last 7 days   Lab Units 06/09/25  0438 06/09/25  0032 06/08/25  0519 06/07/25  0452 06/06/25  0518 06/05/25  0343 06/04/25  0359   WBC Thousand/uL 9.86 7.26 6.13 4.18* 4.13* 3.60* 8.53   HEMOGLOBIN g/dL 9.9* 9.2* 9.0* 8.9* 8.7* 8.8* 9.6*   HEMATOCRIT % 31.3* 28.9* 28.8* 28.9* 28.4* 28.6* 31.8*   PLATELETS Thousands/uL 192 192 180 169 196 184 218   POTASSIUM mmol/L 4.3 4.5 4.3 4.3 4.3 4.7 5.4*   CHLORIDE mmol/L 92* 90* 91* 93* 95* 94* 91*   CO2 mmol/L 25 23 24 23 27 30 27   BUN mg/dL 53* 51* 38* 51* 34* 26* 40*   CREATININE mg/dL 5.15* 4.84* 3.93* 5.22* 4.11* 4.89* 7.00*   CALCIUM mg/dL 8.7 8.7 8.5 8.5 8.4 8.3* 9.0   MAGNESIUM mg/dL 1.9 1.9 1.9 2.1 2.0 2.0  --    PHOSPHORUS mg/dL  --  3.5  --   --   --  4.2*  --    ALBUMIN g/dL  --  3.8  --   --   --  3.3*  --        Administrative Statements     Portions of the record may have been created with voice recognition software. Occasional wrong word or \"sound a like\" substitutions may have occurred due to the inherent limitations of voice recognition software. Read the chart carefully and recognize, using context, where substitutions have occurred.If you have any questions, please contact the dictating provider.         [1]   Current Facility-Administered Medications:     acetaminophen (TYLENOL) tablet 650 mg, 650 mg, Oral, Q6H PRN, Lore Amadorar, DO, 650 mg at 06/08/25 2055    allopurinol (ZYLOPRIM) tablet 100 mg, 100 mg, Oral, Once per day on Tuesday Thursday Saturday, Lore Silver DO, 100 mg at 06/07/25 1537    aspirin chewable tablet 81 mg, 81 mg, Oral, Daily, Lore Silver DO, 81 mg at 06/10/25 0814    atorvastatin (LIPITOR) tablet 80 mg, 80 mg, Oral, Daily With Dinner, Lore Silver DO, 80 mg at 06/09/25 1739    calcium carbonate (TUMS) chewable tablet 500 mg, 500 mg, Oral, BID PRN, GRANT Rich, 500 mg at 06/06/25 0113    famotidine (PEPCID) tablet 20 mg, 20 mg, Oral, HS, GRANT Rich, 20 mg at 06/09/25 2151    " gabapentin (NEURONTIN) capsule 100 mg, 100 mg, Oral, Once per day on Monday Wednesday Friday, Milan Ruiz MD, 100 mg at 06/09/25 1007    heparin (porcine) subcutaneous injection 5,000 Units, 5,000 Units, Subcutaneous, Q12H [facility], Milan Ruiz MD, 5,000 Units at 06/10/25 0814    HYDROmorphone HCl (DILAUDID) injection 0.2 mg, 0.2 mg, Intravenous, Q4H PRN, GRANT Rich, 0.2 mg at 06/10/25 0208    insulin lispro (HumALOG/ADMELOG) 100 units/mL subcutaneous injection 1-5 Units, 1-5 Units, Subcutaneous, TID AC, 1 Units at 06/08/25 1622 **AND** Fingerstick Glucose (POCT), , , TID AC, Lore Silver DO    ipratropium-albuterol (DUO-NEB) 0.5-2.5 mg/3 mL inhalation solution 3 mL, 3 mL, Nebulization, Q4H PRN, Milan Ruiz MD    lidocaine (LIDODERM) 5 % patch 1 patch, 1 patch, Topical, Daily, Lore Silver DO, 1 patch at 06/10/25 0814    melatonin tablet 3 mg, 3 mg, Oral, HS PRN, GRANT Church, 3 mg at 06/08/25 2056    metoprolol succinate (TOPROL-XL) 24 hr tablet 25 mg, 25 mg, Oral, Q12H KEMAL, GRANT Shell, 25 mg at 06/10/25 0813    NIFEdipine (PROCARDIA XL) 24 hr tablet 30 mg, 30 mg, Oral, Daily, Bradley Beaulieu MD, 30 mg at 06/10/25 0818    oxyCODONE (ROXICODONE) split tablet 2.5 mg, 2.5 mg, Oral, Q8H PRN, GRANT Rich, 2.5 mg at 06/09/25 2147    senna (SENOKOT) tablet 8.6 mg, 8.6 mg, Oral, Daily With Lunch, Lore Silver DO, 8.6 mg at 06/06/25 1151    sevelamer (RENAGEL) tablet 1,600 mg, 1,600 mg, Oral, TID With Meals, Lore Silver DO, 1,600 mg at 06/10/25 0813

## 2025-06-10 NOTE — ASSESSMENT & PLAN NOTE
BP has improved  Home Rx: Nifedipine 30 mg daily, furosemide 20 mg twice a day.  Current Rx: Metoprolol succ 25 mg BID.  Nifedipine 30 mg daily which was started on 6/9.  Metoprolol added by cards due to NSTEMI.   Blood pressure is better today continue current medications continue UF with HD

## 2025-06-11 LAB
ANION GAP SERPL CALCULATED.3IONS-SCNC: 12 MMOL/L (ref 4–13)
BACTERIA UR QL AUTO: ABNORMAL /HPF
BASOPHILS # BLD AUTO: 0.02 THOUSANDS/ÂΜL (ref 0–0.1)
BASOPHILS NFR BLD AUTO: 0 % (ref 0–1)
BILIRUB UR QL STRIP: NEGATIVE
BUN SERPL-MCNC: 35 MG/DL (ref 5–25)
CALCIUM SERPL-MCNC: 7.9 MG/DL (ref 8.4–10.2)
CHLORIDE SERPL-SCNC: 94 MMOL/L (ref 96–108)
CLARITY UR: CLEAR
CO2 SERPL-SCNC: 24 MMOL/L (ref 21–32)
COLOR UR: ABNORMAL
CREAT SERPL-MCNC: 4.55 MG/DL (ref 0.6–1.3)
EOSINOPHIL # BLD AUTO: 0.4 THOUSAND/ÂΜL (ref 0–0.61)
EOSINOPHIL NFR BLD AUTO: 5 % (ref 0–6)
ERYTHROCYTE [DISTWIDTH] IN BLOOD BY AUTOMATED COUNT: 14.8 % (ref 11.6–15.1)
GFR SERPL CREATININE-BSD FRML MDRD: 12 ML/MIN/1.73SQ M
GLUCOSE SERPL-MCNC: 132 MG/DL (ref 65–140)
GLUCOSE SERPL-MCNC: 146 MG/DL (ref 65–140)
GLUCOSE SERPL-MCNC: 164 MG/DL (ref 65–140)
GLUCOSE SERPL-MCNC: 92 MG/DL (ref 65–140)
GLUCOSE SERPL-MCNC: 92 MG/DL (ref 65–140)
GLUCOSE UR STRIP-MCNC: ABNORMAL MG/DL
HCT VFR BLD AUTO: 29.2 % (ref 36.5–49.3)
HGB BLD-MCNC: 9.3 G/DL (ref 12–17)
HGB UR QL STRIP.AUTO: ABNORMAL
IMM GRANULOCYTES # BLD AUTO: 0.03 THOUSAND/UL (ref 0–0.2)
IMM GRANULOCYTES NFR BLD AUTO: 0 % (ref 0–2)
KETONES UR STRIP-MCNC: NEGATIVE MG/DL
LEUKOCYTE ESTERASE UR QL STRIP: ABNORMAL
LYMPHOCYTES # BLD AUTO: 1.15 THOUSANDS/ÂΜL (ref 0.6–4.47)
LYMPHOCYTES NFR BLD AUTO: 15 % (ref 14–44)
MCH RBC QN AUTO: 29.6 PG (ref 26.8–34.3)
MCHC RBC AUTO-ENTMCNC: 31.8 G/DL (ref 31.4–37.4)
MCV RBC AUTO: 93 FL (ref 82–98)
MONOCYTES # BLD AUTO: 0.82 THOUSAND/ÂΜL (ref 0.17–1.22)
MONOCYTES NFR BLD AUTO: 11 % (ref 4–12)
NEUTROPHILS # BLD AUTO: 5.12 THOUSANDS/ÂΜL (ref 1.85–7.62)
NEUTS SEG NFR BLD AUTO: 69 % (ref 43–75)
NITRITE UR QL STRIP: NEGATIVE
NON-SQ EPI CELLS URNS QL MICRO: ABNORMAL /HPF
NRBC BLD AUTO-RTO: 0 /100 WBCS
PH UR STRIP.AUTO: 7.5 [PH]
PLATELET # BLD AUTO: 178 THOUSANDS/UL (ref 149–390)
PMV BLD AUTO: 9.5 FL (ref 8.9–12.7)
POTASSIUM SERPL-SCNC: 4.2 MMOL/L (ref 3.5–5.3)
PROT UR STRIP-MCNC: ABNORMAL MG/DL
RBC # BLD AUTO: 3.14 MILLION/UL (ref 3.88–5.62)
RBC #/AREA URNS AUTO: ABNORMAL /HPF
SODIUM SERPL-SCNC: 130 MMOL/L (ref 135–147)
SP GR UR STRIP.AUTO: <1.005 (ref 1–1.03)
UROBILINOGEN UR STRIP-ACNC: <2 MG/DL
WBC # BLD AUTO: 7.54 THOUSAND/UL (ref 4.31–10.16)
WBC #/AREA URNS AUTO: ABNORMAL /HPF

## 2025-06-11 PROCEDURE — 85025 COMPLETE CBC W/AUTO DIFF WBC: CPT | Performed by: INTERNAL MEDICINE

## 2025-06-11 PROCEDURE — 80048 BASIC METABOLIC PNL TOTAL CA: CPT | Performed by: INTERNAL MEDICINE

## 2025-06-11 PROCEDURE — 87086 URINE CULTURE/COLONY COUNT: CPT | Performed by: INTERNAL MEDICINE

## 2025-06-11 PROCEDURE — 87186 SC STD MICRODIL/AGAR DIL: CPT | Performed by: INTERNAL MEDICINE

## 2025-06-11 PROCEDURE — 94660 CPAP INITIATION&MGMT: CPT

## 2025-06-11 PROCEDURE — 99232 SBSQ HOSP IP/OBS MODERATE 35: CPT | Performed by: INTERNAL MEDICINE

## 2025-06-11 PROCEDURE — 82948 REAGENT STRIP/BLOOD GLUCOSE: CPT

## 2025-06-11 PROCEDURE — 97535 SELF CARE MNGMENT TRAINING: CPT

## 2025-06-11 PROCEDURE — 87147 CULTURE TYPE IMMUNOLOGIC: CPT | Performed by: INTERNAL MEDICINE

## 2025-06-11 PROCEDURE — 99239 HOSP IP/OBS DSCHRG MGMT >30: CPT | Performed by: INTERNAL MEDICINE

## 2025-06-11 PROCEDURE — 87077 CULTURE AEROBIC IDENTIFY: CPT | Performed by: INTERNAL MEDICINE

## 2025-06-11 PROCEDURE — 81001 URINALYSIS AUTO W/SCOPE: CPT | Performed by: INTERNAL MEDICINE

## 2025-06-11 RX ORDER — OXYCODONE HYDROCHLORIDE 5 MG/1
2.5 TABLET ORAL EVERY 8 HOURS PRN
Qty: 5 TABLET | Refills: 0 | Status: SHIPPED | OUTPATIENT
Start: 2025-06-11

## 2025-06-11 RX ORDER — METOPROLOL SUCCINATE 25 MG/1
25 TABLET, EXTENDED RELEASE ORAL EVERY 12 HOURS SCHEDULED
Start: 2025-06-11

## 2025-06-11 RX ORDER — FAMOTIDINE 20 MG/1
20 TABLET, FILM COATED ORAL
Start: 2025-06-11

## 2025-06-11 RX ADMIN — HEPARIN SODIUM 5000 UNITS: 5000 INJECTION INTRAVENOUS; SUBCUTANEOUS at 08:34

## 2025-06-11 RX ADMIN — HYDROMORPHONE HYDROCHLORIDE 0.2 MG: 0.2 INJECTION, SOLUTION INTRAMUSCULAR; INTRAVENOUS; SUBCUTANEOUS at 09:31

## 2025-06-11 RX ADMIN — SEVELAMER HYDROCHLORIDE 1600 MG: 800 TABLET, FILM COATED ORAL at 17:22

## 2025-06-11 RX ADMIN — LIDOCAINE 5% 1 PATCH: 700 PATCH TOPICAL at 08:35

## 2025-06-11 RX ADMIN — METOPROLOL SUCCINATE 25 MG: 25 TABLET, EXTENDED RELEASE ORAL at 08:35

## 2025-06-11 RX ADMIN — SEVELAMER HYDROCHLORIDE 1600 MG: 800 TABLET, FILM COATED ORAL at 08:34

## 2025-06-11 RX ADMIN — FAMOTIDINE 20 MG: 20 TABLET, FILM COATED ORAL at 21:16

## 2025-06-11 RX ADMIN — METOPROLOL SUCCINATE 25 MG: 25 TABLET, EXTENDED RELEASE ORAL at 20:49

## 2025-06-11 RX ADMIN — ASPIRIN 81 MG 81 MG: 81 TABLET ORAL at 08:34

## 2025-06-11 RX ADMIN — Medication 2.5 MG: at 05:43

## 2025-06-11 RX ADMIN — NIFEDIPINE 30 MG: 30 TABLET, EXTENDED RELEASE ORAL at 08:34

## 2025-06-11 RX ADMIN — ATORVASTATIN CALCIUM 80 MG: 80 TABLET, FILM COATED ORAL at 17:23

## 2025-06-11 RX ADMIN — HYDROMORPHONE HYDROCHLORIDE 0.2 MG: 0.2 INJECTION, SOLUTION INTRAMUSCULAR; INTRAVENOUS; SUBCUTANEOUS at 02:39

## 2025-06-11 RX ADMIN — HEPARIN SODIUM 5000 UNITS: 5000 INJECTION INTRAVENOUS; SUBCUTANEOUS at 20:49

## 2025-06-11 RX ADMIN — GABAPENTIN 100 MG: 100 CAPSULE ORAL at 08:34

## 2025-06-11 RX ADMIN — Medication 2.5 MG: at 12:16

## 2025-06-11 RX ADMIN — SENNOSIDES 8.6 MG: 8.6 TABLET, FILM COATED ORAL at 13:00

## 2025-06-11 RX ADMIN — SEVELAMER HYDROCHLORIDE 1600 MG: 800 TABLET, FILM COATED ORAL at 13:00

## 2025-06-11 RX ADMIN — HYDROMORPHONE HYDROCHLORIDE 0.2 MG: 0.2 INJECTION, SOLUTION INTRAMUSCULAR; INTRAVENOUS; SUBCUTANEOUS at 16:55

## 2025-06-11 NOTE — ASSESSMENT & PLAN NOTE
6/4-6/6 - Patient presents from rehab facility due to shortness of breath.  He was noted to be hypoxic to 84% on room air and in respiratory distress by medics  Initially required BiPAP in the ER and currently on 4 L  VBG showed pH of 7.3 with PCO2 of 52 and PO2 of 30  Likely in the setting of volume overload due to noncompliance with dialysis sessions  Chest x-ray appears to be pulmonary edema however official read is pending  Further management with dialysis.  Titrate off oxygen as tolerated  COVID pending    6/7 - Secondary to volume overload from missed hemodialysis.  Continue hemodialysis per recommendations from nephrology.  Patient also found to be positive for COVID-19 which may also be contributing. Overall, respiratory status is improved.    6/8 - Improved. Hemodialysis per nephrology. Plan to complete remdesivir today. Will also discontinue dexamethasone as respiratory status is stabilized and patient is saturating well on room air.    6/9 - Respiratory status remains stable. Continue hemodialysis per recommendations from nephrology.  Patient continues to saturate well on room air.  Status post a course of dexamethasone and remdesivir for COVID.    6/10 - Remains oxygenating on baseline room air.  Off treatment for COVID at this time.  Continue to encourage compliance with outpatient dialysis sessions.  With stability, anticipate discharge in the next 1-2 days.     6/11-current - Denies further shortness of breath at this time, and denies acute respiratory complaints.  Cleared for discharge.

## 2025-06-11 NOTE — DISCHARGE SUPPORT
Case Management Assessment & Discharge Planning Note    Patient name Naresh Carpio  Location [address] [address]/[address] [place] [address]-* MRN [id number]  : 1960 Date 2025       Current Admission Date: 2025  Current Admission Diagnosis:Acute respiratory failure with hypoxia (HCC)   Patient Active Problem List    Diagnosis Date Noted    Metabolic encephalopathy 2025    Proliferative diabetic retinopathy of both eyes with macular edema associated with type 2 diabetes mellitus (HCC) 2025    COVID-19 2025    Bilateral leg edema 2025    Ambulatory dysfunction 2025    Retinopathy 2025    At risk for acid-base imbalance 2025    Electrolyte imbalance risk 2025    Hallucinations 2025    Type 2 diabetes mellitus with foot ulcer, without long-term current use of insulin ([facility]) 2025    Insomnia 2025    Hyperphosphatemia 2025    Wound of skin 2025    At high risk for skin breakdown 2025    At risk for venous thromboembolism (VTE) 2025    Impaired mobility and activities of daily living 2025    Visual disturbance 2025    Pseudoaneurysm (HCC) 2025    Acute pain due to trauma 2025    ESRD on dialysis ([facility]) 2025    Wounds, multiple 2025    Traumatic retroperitoneal hemorrhage 2025    Secondary hyperparathyroidism ([facility]) 2025    At risk for electrolyte imbalance 2025    Multiple open wounds of foot 2025    Disorder of acid-base balance 2025    L2 vertebral fracture ([facility]) 2025    Non-compliance with treatment 2025    Generalized weakness 2025    Gout 2025    Closed fracture of proximal end of left humerus with routine healing 02/10/2025    Left shoulder pain 2025    ESRD (end stage renal disease) on dialysis ([facility]) 2025    Primary hypertension 2025    Anemia in ESRD (end-stage renal disease)  ([facility]) 2025    Chronic kidney  disease-mineral and bone disorder 01/16/2025    Renal lesion 12/19/2024    Chronic wound 11/20/2024    Acute respiratory failure with hypoxia ([facility]) 11/19/2024    Volume overload secondary to noncompliance with hemodialysis 11/19/2024    Pleural effusion 11/19/2024    Elevated troponin 11/19/2024    Paroxysmal atrial fibrillation ([facility])     ESRD (end stage renal disease) ([facility]) 10/25/2024    Hyponatremia 10/19/2024    Acute on chronic anemia 10/14/2024    PAD (peripheral artery disease) ([facility]) 10/08/2024    Hyperkalemia 04/06/2024    Difficulty with speech 03/19/2024    History of amputation of hallux ([facility]) 03/18/2024    At risk for constipation 09/06/2023    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 mellitus, with necrosis of bone ([facility])     Acquired deformity of foot, right     Charcot's joint     Open wound of right foot 08/21/2023    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 mellitus, with bone involvement without evidence of necrosis ([facility])     Diabetic ulcer of left midfoot associated with type 2 diabetes mellitus, limited to breakdown of skin ([facility])     Diabetic polyneuropathy associated with type 2 diabetes mellitus ([facility])     History of amputation of lesser toe of left foot ([facility])     Onychomycosis     Traumatic retroperitoneal hematoma 08/19/2023    Osteoarthritis of left hip 07/27/2022    Severe Left Orchalgia 07/26/2022    Right shoulder pain 07/26/2022    SIRS (systemic inflammatory response syndrome) ([facility]) 07/26/2022    Left hip pain 07/06/2022    Hemodialysis status ([facility])     Hypervolemia     Chronic anemia 01/21/2022    History of prediabetes     Essential hypertension     History of TIA (transient ischemic attack)     T10 vertebral fracture ([facility])       LOS (days): 6  Geometric Mean LOS (GMLOS) (days): 3.6  Days to GMLOS:-2.5   [employer] Appeal Submitted  [facility] was tasked to submit clinicals for a [employer] Appeal by Care Manager: Erika DUEÑAS  Case Control ID:  [id number]  EMR Key: [id number]  Appeal is pending. Clinicals sent via: [employer] Online Portal   Notified: Erika DUEÑAS       Please reach out to CM for updates on any clinical information.

## 2025-06-11 NOTE — PROGRESS NOTES
Progress Note - Nephrology   Name: Naresh Carpio 64 y.o. male I MRN: [id number]  Unit/Bed#: [address] [place] [address] I Date of Admission: 6/4/2025   Date of Service: 6/11/2025 I Hospital Day: 6    Assessment & Plan  ESRD (end stage renal disease) ([facility])  [facility] TTS  Access: R IJ permcath  EDW 94.7 kg.  Last postdialysis weight was 89.7 kg.  Decrease target weight to 89.5 kg and will continue to challenge  Had urgent HD treatment on Wednesday, 6/4/25, due to hyperkalemia and fluid overload.     Status post intermittent HD on 6/10 with postdialysis weight 88.3 kg.  1000 mL of fluid was removed.  He appears euvolemic next  treatment tomorrow    Acute respiratory failure with hypoxia ([facility])  Felt to be due to fluid overload + infectious etiology.  Chest x-ray was suggestive of pulmonary edema. COVID positive on admission.   Of note, troponin levels elevated and cards recommended cardiac cath which patient is refusing.   Continue management of COVID per primary team    Essential hypertension  BP stable and is at goal.  Home Rx: Nifedipine 30 mg daily, furosemide 20 mg twice a day.  Current Rx: Metoprolol succ 25 mg BID.  Nifedipine 30 mg daily which was started on 6/9.  Metoprolol added by cards due to NSTEMI.   Blood pressure stable, continue current treatment    Elevated troponin  Cardiology consulted.  Patient is refusing cardiac catheterization.      Volume overload secondary to noncompliance with hemodialysis  10/9/24 Echo EF 60-65%, G1DD  Volume status has improved with UF with HD, decreased target weight.  Continue UF with HD  Anemia in ESRD (end-stage renal disease)  ([facility])  Hgb is below goal at 9.3 g/dL, stable  He is on Mircera 150 mcg every 2 weeks and Venofer.  Continue to monitor hemoglobin. no need for SURINDER for now    Chronic kidney disease-mineral and bone disorder  He is not on calcitriol in the outpatient setting.  Home Rx: Sevelamer.   Continue Sevelamer 1600 mg TID and low phos diet. Phos at goal.  Last  phosphorus was 3.5    Bilateral leg edema  This is chronic and stable and improving with more UF with HD.  Continue UF with HD, edema has improved with decreasing target weight.  Continue UF with HD    COVID-19  On remdesivir and dexamethasone per primary service.  Hyponatremia  -Due to impaired free water excretion, sodium was 130 on last blood work yesterday, continue UF with HD, continue fluid restriction  Metabolic encephalopathy  Mental status has been fluctuating, patient is more awake alert, complaining of back pain.    I have reviewed the nephrology recommendations including plan for hemodialysis treatment tomorrow, with primary team, and we are in agreement with renal plan including the information outlined above.  overall stable for discharge per nephrology side    Subjective   Complain of back pain, receiving pain medications, no shortness of breath, had intermittent HD treatment yesterday    Objective :  Temp:  [97.7 °F (36.5 °C)-98.4 °F (36.9 °C)] 98.4 °F (36.9 °C)  HR:  [56-66] 62  BP: (128-171)/(50-78) 147/50  Resp:  [18-19] 19  SpO2:  [94 %-99 %] 95 %  O2 Device: Nasal cannula  Nasal Cannula O2 Flow Rate (L/min):  [2 L/min] 2 L/min    Current Weight: Weight - Scale: 89.7 kg (197 lb 12 oz)  First Weight: Weight - Scale: 90.8 kg (200 lb 2.8 oz)  I/O         06/09 0701  06/10 0700 06/10 0701  06/11 0700 06/11 0701  06/12 0700    P.O. 340 700     I.V. (mL/kg)  520 (5.8)     Total Intake(mL/kg) 340 (3.8) 1220 (13.6)     Urine (mL/kg/hr) 400 (0.2) 700 (0.3)     Other  2000     Total Output 400 2700     Net -60 -1480                  Physical Exam  General:  Ill looking, awake.  Eyes: Conjunctivae pink,  Sclera anicteric  ENT: lips and mucous membranes moist  Neck: supple   Chest: Clear to Auscultation both lungs,  no crackles, ronchus or wheezing.  CVS: S1 & S2 present, normal rate, regular rhythm, no murmur.  Abdomen: soft, non-tender, non-distended, Bowel sounds normoactive  Extremities: no edema of   "legs  Skin: no rash  Neuro: awake, alert, oriented x 3   Psych: Mood and affect appropriate    Medications:  Current Medications[1]      Lab Results: I have reviewed the following results:  Results from last 7 days   Lab Units 06/11/25  0551 06/10/25  0939 06/09/25  0438 06/09/25  0032 06/08/25  0519 06/07/25  0452 06/06/25  0518 06/05/25  0343   WBC Thousand/uL 7.54 8.57 9.86 7.26 6.13 4.18* 4.13* 3.60*   HEMOGLOBIN g/dL 9.3* 8.7* 9.9* 9.2* 9.0* 8.9* 8.7* 8.8*   HEMATOCRIT % 29.2* 27.2* 31.3* 28.9* 28.8* 28.9* 28.4* 28.6*   PLATELETS Thousands/uL 178 181 192 192 180 169 196 184   POTASSIUM mmol/L 4.2 4.5 4.3 4.5 4.3 4.3 4.3 4.7   CHLORIDE mmol/L 94* 93* 92* 90* 91* 93* 95* 94*   CO2 mmol/L 24 22 25 23 24 23 27 30   BUN mg/dL 35* 67* 53* 51* 38* 51* 34* 26*   CREATININE mg/dL 4.55* 6.71* 5.15* 4.84* 3.93* 5.22* 4.11* 4.89*   CALCIUM mg/dL 7.9* 7.9* 8.7 8.7 8.5 8.5 8.4 8.3*   MAGNESIUM mg/dL  --  2.0 1.9 1.9 1.9 2.1 2.0 2.0   PHOSPHORUS mg/dL  --   --   --  3.5  --   --   --  4.2*   ALBUMIN g/dL  --   --   --  3.8  --   --   --  3.3*       Administrative Statements     Portions of the record may have been created with voice recognition software. Occasional wrong word or \"sound a like\" substitutions may have occurred due to the inherent limitations of voice recognition software. Read the chart carefully and recognize, using context, where substitutions have occurred.If you have any questions, please contact the dictating provider.       [1]   Current Facility-Administered Medications:     acetaminophen (TYLENOL) tablet 650 mg, 650 mg, Oral, Q6H PRN, Lore Silver DO, 650 mg at 06/08/25 2055    allopurinol (ZYLOPRIM) tablet 100 mg, 100 mg, Oral, Once per day on Tuesday Thursday Saturday, Lore Silver DO, 100 mg at 06/10/25 1600    aspirin chewable tablet 81 mg, 81 mg, Oral, Daily, Lore Silver DO, 81 mg at 06/11/25 0834    atorvastatin (LIPITOR) tablet 80 mg, 80 mg, Oral, Daily With Dinner, Lore Sliver, " DO, 80 mg at 06/10/25 1531    calcium carbonate (TUMS) chewable tablet 500 mg, 500 mg, Oral, BID PRN, GRANT Rich, 500 mg at 06/06/25 0113    famotidine (PEPCID) tablet 20 mg, 20 mg, Oral, HS, GRANT Rich, 20 mg at 06/10/25 2224    gabapentin (NEURONTIN) capsule 100 mg, 100 mg, Oral, Once per day on Monday Wednesday Friday, Milan Ruiz MD, 100 mg at 06/11/25 0834    heparin (porcine) subcutaneous injection 5,000 Units, 5,000 Units, Subcutaneous, Q12H KEMAL, Milan Ruiz MD, 5,000 Units at 06/11/25 0834    hydrALAZINE (APRESOLINE) injection 5 mg, 5 mg, Intravenous, Q6H PRN, Allegra Carrero MD    HYDROmorphone HCl (DILAUDID) injection 0.2 mg, 0.2 mg, Intravenous, Q4H PRN, GRANT Rich, 0.2 mg at 06/11/25 0931    insulin lispro (HumALOG/ADMELOG) 100 units/mL subcutaneous injection 1-5 Units, 1-5 Units, Subcutaneous, TID AC, 1 Units at 06/08/25 1622 **AND** Fingerstick Glucose (POCT), , , TID AC, Lore Silver DO    ipratropium-albuterol (DUO-NEB) 0.5-2.5 mg/3 mL inhalation solution 3 mL, 3 mL, Nebulization, Q4H PRN, Milan Ruiz MD    lidocaine (LIDODERM) 5 % patch 1 patch, 1 patch, Topical, Daily, Lore Silver DO, 1 patch at 06/11/25 0835    melatonin tablet 3 mg, 3 mg, Oral, HS PRN, GRANT Church, 3 mg at 06/08/25 2056    metoprolol succinate (TOPROL-XL) 24 hr tablet 25 mg, 25 mg, Oral, Q12H KEMAL, RAMEZ ShellNP, 25 mg at 06/11/25 0835    NIFEdipine (PROCARDIA XL) 24 hr tablet 30 mg, 30 mg, Oral, Daily, Bradley Beaulieu MD, 30 mg at 06/11/25 0834    oxyCODONE (ROXICODONE) split tablet 2.5 mg, 2.5 mg, Oral, Q8H PRN, GRANT Rich, 2.5 mg at 06/11/25 0543    senna (SENOKOT) tablet 8.6 mg, 8.6 mg, Oral, Daily With Lunch, Lore Silver DO, 8.6 mg at 06/10/25 1300    sevelamer (RENAGEL) tablet 1,600 mg, 1,600 mg, Oral, TID With Meals, Lore Silver DO, 1,600 mg at 06/11/25 0834

## 2025-06-11 NOTE — ASSESSMENT & PLAN NOTE
Completed Remdesivir course and discontinued Decadron in the setting of return to baseline room air  Airborne/contact precautions

## 2025-06-11 NOTE — ASSESSMENT & PLAN NOTE
Encourage compliance to outpatient dialysis sessions  Received 80 mg of IV Lasix in the ER for diuresis earlier in hospital course  Ongoing routine hemodialysis per nephrology

## 2025-06-11 NOTE — ASSESSMENT & PLAN NOTE
Continue Procardia/Toprol XL - PRN IV Hydralazine on board for BP spikes during hospital course  Cardiac diet

## 2025-06-11 NOTE — ASSESSMENT & PLAN NOTE
[facility] TTS  Access: R IJ permcath  EDW 94.7 kg.  Last postdialysis weight was 89.7 kg.  Decrease target weight to 89.5 kg and will continue to challenge  Had urgent HD treatment on Wednesday, 6/4/25, due to hyperkalemia and fluid overload.     Status post intermittent HD on 6/10 with postdialysis weight 88.3 kg.  1000 mL of fluid was removed.  He appears euvolemic next  treatment tomorrow

## 2025-06-11 NOTE — OCCUPATIONAL THERAPY NOTE
"OT TREATMENT         06/11/25 1140   OT Last Visit   OT Visit Date 06/11/25   Note Type   Note Type Treatment   Pain Assessment   Pain Assessment Tool 0-10   Pain Score 7   Pain Location/Orientation Location: Generalized   Hospital Pain Intervention(s) Repositioned;Ambulation/increased activity;Emotional support  (RN aware)   Restrictions/Precautions   Braces or Orthoses LSO   Other Precautions Airborne/isolation;Contact/isolation;Chair Alarm;Bed Alarm;Fall Risk;Pain   ADL   Where Assessed Chair   Grooming Assistance 5  Supervision/Setup   Grooming Deficit Setup;Increased time to complete   UB Dressing Assistance 4  Minimal Assistance   UB Dressing Deficit Verbal cueing;Increased time to complete  (due to LSO brace)   LB Dressing Assistance 4  Minimal Assistance   LB Dressing Deficit Requires assistive device for steadying;Verbal cueing;Supervision/safety;Increased time to complete   Toileting Assistance  4  Minimal Assistance   Toileting Deficit Supervison/safety;Increased time to complete;Verbal cueing   Bed Mobility   Supine to Sit 5  Supervision   Additional items Verbal cues   Additional Comments Repeat education regarding need to wear LSO brace when upright > 45 degrees or OOB to facilitate fracture healing and prevent further injury.   Transfers   Sit to Stand 5  Supervision  (with RW)   Additional items Verbal cues   Stand to Sit 5  Supervision  (with RW)   Additional items Verbal cues   Stand pivot 5  Supervision  (with RW)   Additional items Verbal cues   Functional Mobility   Functional Mobility 4  Minimal assistance   Additional Comments household distances   Additional items Rolling walker   Subjective   Subjective \"I ache all over.\"   Cognition   Overall Cognitive Status Impaired   Arousal/Participation Alert;Cooperative   Attention Attends with cues to redirect   Orientation Level Oriented X4   Memory Decreased recall of precautions   Following Commands Follows multistep commands with increased time " or repetition   Activity Tolerance   Activity Tolerance Patient limited by pain   Medical Staff Made Aware CURLY Alfonso   Assessment   Assessment Pt seen for OT treatment session. Pt continues to make steady progress towards OT goals. Pt limited by generalized pain and weakness. Repeat education needed regarding the importance of wearing LSO brace when OOB.   The patient's raw score on the AM-PAC Daily Activity Inpatient Short Form is 18. A raw score of less than 19 suggests the patient may benefit from discharge to post-acute rehabilitation services. Please refer to the recommendation of the Occupational Therapist for safe discharge planning.   Plan   Treatment Interventions ADL retraining;Functional transfer training;Endurance training;Patient/family training;Equipment evaluation/education;Compensatory technique education;Energy conservation;Activityengagement   OT Frequency 3-5x/wk   Discharge Recommendation   Rehab Resource Intensity Level, OT II (Moderate Resource Intensity)   AM-PAC Daily Activity Inpatient   Lower Body Dressing 3   Bathing 3   Toileting 3   Upper Body Dressing 2   Grooming 3   Eating 4   Daily Activity Raw Score 18   Daily Activity Standardized Score (Calc for Raw Score >=11) 38.66   AM-PAC Applied Cognition Inpatient   Following a Speech/Presentation 3   Understanding Ordinary Conversation 4   Taking Medications 4   Remembering Where Things Are Placed or Put Away 4   Remembering List of 4-5 Errands 3   Taking Care of Complicated Tasks 4   Applied Cognition Raw Score 22   Applied Cognition Standardized Score 47.83   End of Consult   Education Provided Yes   Patient Position at End of Consult Bedside chair;Bed/Chair alarm activated;All needs within reach   Nurse Communication Nurse aware of consult   Licensure   [place] License Number  Farheen Conklin MS, OTR/L, [place] Lic# [id number]

## 2025-06-11 NOTE — PLAN OF CARE
Problem: METABOLIC, FLUID AND ELECTROLYTES - ADULT  Goal: Electrolytes maintained within normal limits  Description: INTERVENTIONS:  - Monitor labs and assess patient for signs and symptoms of electrolyte imbalances  - Administer electrolyte replacement as ordered  - Monitor response to electrolyte replacements, including repeat lab results as appropriate  - Instruct patient on fluid and nutrition as appropriate  Outcome: Progressing  Goal: Fluid balance maintained  Description: INTERVENTIONS:  - Monitor labs   - Monitor I/O and WT  - Instruct patient on fluid and nutrition as appropriate  - Assess for signs & symptoms of volume excess or deficit  Outcome: Progressing     Problem: Prexisting or High Potential for Compromised Skin Integrity  Goal: Skin integrity is maintained or improved  Description: INTERVENTIONS:  - Identify patients at risk for skin breakdown  - Assess and monitor skin integrity including under and around medical devices   - Assess and monitor nutrition and hydration status  - Monitor labs  - Assess for incontinence   - Turn and reposition patient  - Assist with mobility/ambulation  - Relieve pressure over chente prominences   - Avoid friction and shearing  - Provide appropriate hygiene as needed including keeping skin clean and dry  - Evaluate need for skin moisturizer/barrier cream  - Collaborate with interdisciplinary team  - Patient/family teaching  - Consider wound care consult    Assess:  - Review Praful scale daily  - Clean and moisturize skin     Bed Management:  - Have minimal linens on bed & keep smooth, unwrinkled  - Change linens as needed when moist     Activity:  -  - Encourage activity and walks on unit  - Encourage or provide ROM exercises   -     Skin Care:  - Avoid use of baby powder, tape, friction and shearing, hot water or constrictive clothing        Outcome: Progressing     Problem: RESPIRATORY - ADULT  Goal: Achieves optimal ventilation and oxygenation  Description:  INTERVENTIONS:  - Assess for changes in respiratory status  - Assess for changes in mentation and behavior  - Position to facilitate oxygenation and minimize respiratory effort  - Oxygen administered by appropriate delivery if ordered  - Initiate smoking cessation education as indicated  - Encourage broncho-pulmonary hygiene including cough, deep breathe, Incentive Spirometry  - Assess the need for suctioning and aspirate as needed  - Assess and instruct to report SOB or any respiratory difficulty  - Respiratory Therapy support as indicated  Outcome: Progressing     Problem: PAIN - ADULT  Goal: Verbalizes/displays adequate comfort level or baseline comfort level  Description: Interventions:  - Encourage patient to monitor pain and request assistance  - Assess pain using appropriate pain scale  - Administer analgesics as ordered based on type and severity of pain and evaluate response  - Implement non-pharmacological measures as appropriate and evaluate response  - Consider cultural and social influences on pain and pain management  - Notify physician/advanced practitioner if interventions unsuccessful or patient reports new pain  - Educate patient/family on pain management process including their role and importance of  reporting pain   - Provide non-pharmacologic/complimentary pain relief interventions  Outcome: Progressing     Problem: INFECTION - ADULT  Goal: Absence or prevention of progression during hospitalization  Description: INTERVENTIONS:  - Assess and monitor for signs and symptoms of infection  - Monitor lab/diagnostic results  - Monitor all insertion sites, i.e. indwelling lines, tubes, and drains  - Monitor endotracheal if appropriate and nasal secretions for changes in amount and color  - [place] appropriate cooling/warming therapies per order  - Administer medications as ordered  - Instruct and encourage patient and family to use good hand hygiene technique  - Identify and instruct in appropriate  isolation precautions for identified infection/condition  Outcome: Progressing     Problem: SAFETY ADULT  Goal: Patient will remain free of falls  Description: INTERVENTIONS:  - Educate patient/family on patient safety including physical limitations  - Instruct patient to call for assistance with activity   - Consider consulting OT/PT to assist with strengthening/mobility based on AM PAC & JH-HLM score  - Consult OT/PT to assist with strengthening/mobility   - Keep Call bell within reach  - Keep bed low and locked with side rails adjusted as appropriate  - Keep care items and personal belongings within reach  - Initiate and maintain comfort rounds    Outcome: Progressing  Goal: Maintain or return to baseline ADL function  Description: INTERVENTIONS:  -  Assess patient's ability to carry out ADLs; assess patient's baseline for ADL function and identify physical deficits which impact ability to perform ADLs (bathing, care of mouth/teeth, toileting, grooming, dressing, etc.)  - Assess/evaluate cause of self-care deficits   - Assess range of motion  - Assess patient's mobility; develop plan if impaired  - Assess patient's need for assistive devices and provide as appropriate  - Encourage maximum independence but intervene and supervise when necessary  - Involve family in performance of ADLs  - Assess for home care needs following discharge   - Consider OT consult to assist with ADL evaluation and planning for discharge  - Provide patient education as appropriate  - Monitor functional capacity and physical performance, use of AM PAC & JH-HLM   - Monitor gait, balance and fatigue with ambulation    Outcome: Progressing  Goal: Maintains/Returns to pre admission functional level  Description: INTERVENTIONS:  - Perform AM-PAC 6 Click Basic Mobility/ Daily Activity assessment daily.  - Set and communicate daily mobility goal to care team and patient/family/caregiver.     - Out of bed for toileting  - Record patient progress  and toleration of activity level   Outcome: Progressing     Problem: DISCHARGE PLANNING  Goal: Discharge to home or other facility with appropriate resources  Description: INTERVENTIONS:  - Identify barriers to discharge w/patient and caregiver  - Arrange for needed discharge resources and transportation as appropriate  - Identify discharge learning needs (meds, wound care, etc.)  - Arrange for interpretive services to assist at discharge as needed  - Refer to Case Management Department for coordinating discharge planning if the patient needs post-hospital services based on physician/advanced practitioner order or complex needs related to functional status, cognitive ability, or social support system  Outcome: Progressing     Problem: Knowledge Deficit  Goal: Patient/family/caregiver demonstrates understanding of disease process, treatment plan, medications, and discharge instructions  Description: Complete learning assessment and assess knowledge base.  Interventions:  - Provide teaching at level of understanding  - Provide teaching via preferred learning methods  Outcome: Progressing     Problem: Nutrition/Hydration-ADULT  Goal: Nutrient/Hydration intake appropriate for improving, restoring or maintaining nutritional needs  Description: Monitor and assess patient's nutrition/hydration status for malnutrition. Collaborate with interdisciplinary team and initiate plan and interventions as ordered.  Monitor patient's weight and dietary intake as ordered or per policy. Utilize nutrition screening tool and intervene as necessary. Determine patient's food preferences and provide high-protein, high-caloric foods as appropriate.     INTERVENTIONS:  - Monitor oral intake, urinary output, labs, and treatment plans  - Assess nutrition and hydration status and recommend course of action  - Evaluate amount of meals eaten  - Assist patient with eating if necessary   - Allow adequate time for meals  - Recommend/ encourage  appropriate diets, oral nutritional supplements, and vitamin/mineral supplements  - Order, calculate, and assess calorie counts as needed  - Recommend, monitor, and adjust tube feedings and TPN/PPN based on assessed needs  - Assess need for intravenous fluids  - Provide specific nutrition/hydration education as appropriate  - Include patient/family/caregiver in decisions related to nutrition  Outcome: Progressing

## 2025-06-11 NOTE — ASSESSMENT & PLAN NOTE
Waxing/waning earlier in hospital course, but generally has returned to baseline  CT imaging negative for acute intracranial etiology - noted, however, a density in the right lobe, possibly sequela of prior retinal detachment

## 2025-06-11 NOTE — ASSESSMENT & PLAN NOTE
Hgb is below goal at 9.3 g/dL, stable  He is on Mircera 150 mcg every 2 weeks and Venofer.  Continue to monitor hemoglobin. no need for SURINDER for now

## 2025-06-11 NOTE — DISCHARGE SUMMARY
Discharge Summary - Hospitalist   Name: Naresh Carpio 64 y.o. male I MRN: [id number]  Unit/Bed#: [address] [place] [address] I Date of Admission: 6/4/2025   Date of Service: 6/12/2025 I Hospital Day: 7     Assessment & Plan  Acute respiratory failure with hypoxia (HCC)  6/4-6/6 - Patient presents from rehab facility due to shortness of breath.  He was noted to be hypoxic to 84% on room air and in respiratory distress by medics  Initially required BiPAP in the ER and currently on 4 L  VBG showed pH of 7.3 with PCO2 of 52 and PO2 of 30  Likely in the setting of volume overload due to noncompliance with dialysis sessions  Chest x-ray appears to be pulmonary edema however official read is pending  Further management with dialysis.  Titrate off oxygen as tolerated  COVID pending    6/7 - Secondary to volume overload from missed hemodialysis.  Continue hemodialysis per recommendations from nephrology.  Patient also found to be positive for COVID-19 which may also be contributing. Overall, respiratory status is improved.    6/8 - Improved. Hemodialysis per nephrology. Plan to complete remdesivir today. Will also discontinue dexamethasone as respiratory status is stabilized and patient is saturating well on room air.    6/9 - Respiratory status remains stable. Continue hemodialysis per recommendations from nephrology.  Patient continues to saturate well on room air.  Status post a course of dexamethasone and remdesivir for COVID.    6/10 - Remains oxygenating on baseline room air.  Off treatment for COVID at this time.  Continue to encourage compliance with outpatient dialysis sessions.  With stability, anticipate discharge in the next 1-2 days.     6/11-current - Denies further shortness of breath at this time, and denies acute respiratory complaints.  Cleared for discharge.  Volume overload secondary to noncompliance with hemodialysis  Encourage compliance to outpatient dialysis sessions  Received 80 mg of IV Lasix in the ER for  diuresis earlier in hospital course  Ongoing routine hemodialysis per nephrology  COVID-19  Completed Remdesivir course and discontinued Decadron in the setting of return to baseline room air  Airborne/contact precautions  Elevated troponin  Patient with significant troponin elevation greater than 22,973, now trending down.  No chest pain.  Concern for NSTEMI.  Initially treated with IV heparin now discontinued per recommendations from cardiology.  Patient adamantly refused cardiac catheterization as recommended by cardiology.  Abnormal urinalysis  ADDENDUM -> added to dc summary on 6/12 -> Early morning on 6/11 (2:52 AM), an order was placed (unknown provider) for urinalysis that reflex to culture growing Enterococcus faecalis -> no clinical signs of infection at this time as prior altered mentation has improved/resolved without infectious treatment   Continue to monitor off any antibiotics  ESRD (end stage renal disease) (HCC)  Continue routine hemodialysis per nephrology  On Renagel supplementation  SIRS (systemic inflammatory response syndrome) (HCC)  As noted by tachycardia, tachypnea due to volume overload.  Lactic acid within normal limits.  No obvious signs of infection, hold off on antibiotics  Blood cultures drawn in the ER, with no growth to date  Metabolic encephalopathy  Waxing/waning earlier in hospital course, but generally has returned to baseline  CT imaging negative for acute intracranial etiology - noted, however, a density in the right lobe, possibly sequela of prior retinal detachment  Essential hypertension  Continue Procardia/Toprol XL - PRN IV Hydralazine on board for BP spikes during hospital course  Cardiac diet  Paroxysmal atrial fibrillation (HCC)  Rate controlled on Toprol-XL   Per recent discharge summary, patient was refusing Eliquis due to his recent history of retroperitoneal hematoma in April 2025  Bilateral leg edema  Bilateral leg edema - L > R, no longer on  "anticoagulation  Bilateral lower extremity venous duplex negative for DVT.  Anemia in ESRD (end-stage renal disease)  (HCC)  Hemoglobin stable  L2 vertebral fracture (HCC)  Per prior note, patient to utilize LSO brace  Ongoing PT/OT evaluations with recommendation of skilled rehab  Hyponatremia  Management via dialysis             Discharging Physician / Practitioner: Allegra Carrero MD  PCP: Jeffrey Jackson    Admission Date:   Admission Orders (From admission, onward)       Ordered        06/05/25 1240  INPATIENT ADMISSION  Once            06/04/25 0525  Place in Observation  Once                          Discharge Date: 06/13/25      Reason for Admission:     Shortness of breath      Discharge Diagnoses:     Principal Problem:    Acute respiratory failure with hypoxia (HCC)    Active Problems:    Essential hypertension    SIRS (systemic inflammatory response syndrome) (HCC)    Hyponatremia    ESRD (end stage renal disease) (HCC)    Paroxysmal atrial fibrillation (HCC)    Volume overload secondary to noncompliance with hemodialysis    Elevated troponin    Anemia in ESRD (end-stage renal disease)  (HCC)    Chronic kidney disease-mineral and bone disorder    L2 vertebral fracture (HCC)    Bilateral leg edema    Proliferative diabetic retinopathy of both eyes with macular edema associated with type 2 diabetes mellitus (HCC)    COVID-19    Resolved Problems:    Metabolic encephalopathy      Consultations During Hospital Stay:   Nephrology  Cardiology      Condition at Discharge: fair       Discharge Day Visit / Exam:     Vitals: Blood Pressure: (!) 135/49 (06/12/25 1524)  Pulse: 59 (06/12/25 1400)  Temperature: 97.9 °F (36.6 °C) (06/12/25 1400)  Temp Source: Oral (06/12/25 0920)  Respirations: 18 (06/12/25 1524)  Height: 6' 1\" (185.4 cm) (06/04/25 1415)  Weight - Scale: 89.7 kg (197 lb 12 oz) (06/07/25 1230)  SpO2: 98 % (06/12/25 0840)      Physical exam - I had a face-to-face encounter with the patient on day of " discharge.      Discussion with Patient and/or Family:  The patient has been advised to return to the ER immediately if any symptoms recur or worsen.       Discharge Instructions/Information to Patient and/or Family:   See after visit summary for information provided to patient and/or family.        Provisions for Follow-up Care:   See after visit summary for information related to follow-up care and any pertinent home health orders.        Disposition:   Skilled rehab facility      Discharge Medications:   See after visit summary for reconciled discharge medications provided to patient and/or family.        Discharge Statement:   I spent 38 minutes discharging the patient. This time was spent on the day of discharge. I had direct contact with the patient on the day of discharge. Greater than 50% of the total time was spent examining patient, answering all patient questions, arranging and discussing plan of care with patient as well as directly providing post-discharge instructions.  Additional time then spent on discharge activities.           JAMIN GEORGES MD    Hospitalist - [facility] Internal Medicine        ** Please Note:  Documentation is constructed using a voice recognition dictation system.  An occasional wrong word/phrase or “sound-a-like” substitution may have been picked up by dictation device due to the inherent limitations of voice recognition software.  Read the chart carefully and recognize, using reasonable context, where substitutions may have occurred.**

## 2025-06-11 NOTE — CASE MANAGEMENT
Case Management Discharge Planning Note    Patient name Naresh Carpio  Location [address] [place] [address]/[address] [place] [address]-* MRN [id number]  : 1960 Date 2025       Current Admission Date: 2025  Current Admission Diagnosis:Acute respiratory failure with hypoxia ([facility])   Patient Active Problem List    Diagnosis Date Noted    Metabolic encephalopathy 2025    Proliferative diabetic retinopathy of both eyes with macular edema associated with type 2 diabetes mellitus (HCC) 2025    COVID-19 2025    Bilateral leg edema 2025    Ambulatory dysfunction 2025    Retinopathy 2025    At risk for acid-base imbalance 2025    Electrolyte imbalance risk 2025    Hallucinations 2025    Type 2 diabetes mellitus with foot ulcer, without long-term current use of insulin ([facility]) 2025    Insomnia 2025    Hyperphosphatemia 2025    Wound of skin 2025    At high risk for skin breakdown 2025    At risk for venous thromboembolism (VTE) 2025    Impaired mobility and activities of daily living 2025    Visual disturbance 2025    Pseudoaneurysm (HCC) 2025    Acute pain due to trauma 2025    ESRD on dialysis ([facility]) 2025    Wounds, multiple 2025    Traumatic retroperitoneal hemorrhage 2025    Secondary hyperparathyroidism ([facility]) 2025    At risk for electrolyte imbalance 2025    Multiple open wounds of foot 2025    Disorder of acid-base balance 2025    L2 vertebral fracture ([facility]) 2025    Non-compliance with treatment 2025    Generalized weakness 2025    Gout 2025    Closed fracture of proximal end of left humerus with routine healing 02/10/2025    Left shoulder pain 2025    ESRD (end stage renal disease) on dialysis ([facility]) 2025    Primary hypertension 2025    Anemia in ESRD (end-stage renal disease)  ([facility]) 2025    Chronic kidney disease-mineral and bone  disorder 01/16/2025    Renal lesion 12/19/2024    Chronic wound 11/20/2024    Acute respiratory failure with hypoxia ([facility]) 11/19/2024    Volume overload secondary to noncompliance with hemodialysis 11/19/2024    Pleural effusion 11/19/2024    Elevated troponin 11/19/2024    Paroxysmal atrial fibrillation ([facility])     ESRD (end stage renal disease) ([facility]) 10/25/2024    Hyponatremia 10/19/2024    Acute on chronic anemia 10/14/2024    PAD (peripheral artery disease) ([facility]) 10/08/2024    Hyperkalemia 04/06/2024    Difficulty with speech 03/19/2024    History of amputation of hallux ([facility]) 03/18/2024    At risk for constipation 09/06/2023    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 mellitus, with necrosis of bone ([facility])     Acquired deformity of foot, right     Charcot's joint     Open wound of right foot 08/21/2023    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 mellitus, with bone involvement without evidence of necrosis ([facility])     Diabetic ulcer of left midfoot associated with type 2 diabetes mellitus, limited to breakdown of skin ([facility])     Diabetic polyneuropathy associated with type 2 diabetes mellitus ([facility])     History of amputation of lesser toe of left foot ([facility])     Onychomycosis     Traumatic retroperitoneal hematoma 08/19/2023    Osteoarthritis of left hip 07/27/2022    Severe Left Orchalgia 07/26/2022    Right shoulder pain 07/26/2022    SIRS (systemic inflammatory response syndrome) ([facility]) 07/26/2022    Left hip pain 07/06/2022    Hemodialysis status ([facility])     Hypervolemia     Chronic anemia 01/21/2022    History of prediabetes     Essential hypertension     History of TIA (transient ischemic attack)     T10 vertebral fracture ([facility])       LOS (days): 6  Geometric Mean LOS (GMLOS) (days): 3.6  Days to GMLOS:-2.3     OBJECTIVE:  Risk of Unplanned Readmission Score: 75.8     Current admission status: Inpatient   Preferred Pharmacy:   [facility] [address] [facility]  "[place] [address] [address]  [address]  [facility] [address]  Phone: [phone] Fax: [phone]    [facility] [address] - [place], [place] - [address]  [address]  [facility] [address]  Phone: [phone] Fax: [phone]    Primary Care Provider: Jeffrey Jackson    Primary Insurance: MEDICARE  Secondary Insurance:     DISCHARGE DETAILS:    Discharge planning discussed with:: Sister Alicja  Freedom of Choice: Yes    Contacts  Patient Contacts: Alicja Carpio (sister)  Relationship to Patient:: Family  Contact Method: Phone  Phone Number: [phone]  Reason/Outcome: Discharge Planning    IMM Given (Date):: 06/11/25 (IMM was reviewed with patient's sister Alicja on phone/voicemail. Original placed at bedside with patient and copy placed in scan bin for patient's chart.)  IMM Given to:: Patient  Family notified:: Sister Alicja    IMM reviewed with patient's caregiver, patient's caregiver agrees with discharge determination.     CM called and left message for patient's sister Alicja to provide update on patient's discharge planning and review IMM.    Update at 1115:   CM called sister Alicja to make her aware patient is medically cleared for discharge today. Alicja stated she was surprised and after speaking with patient on the phone doesn't feel patient is ready to discharge back to facility. Alicja stated patient \"sounds really bad\". Alicja made CM aware that she is going to appeal patient's discharge, CM provided [facility] appeal information and CM requested Alicja call to confirm after appeal has been made and a case # assigned. CM made provider and nursing aware.     Update at 1145:   Attending made CM aware that patient verbalized his agreement with today's discharge and his preference to return to facility today. Per patient's agreement with today's discharge, CM called and left message for Alicja making her aware of patient's decision. ELOISE also made Alicja " aware transportation will be requested and time will be confirmed when available.     Update at 1200:   CM made aware that patient is stating he has decided he doesn't feel he is ready to discharge and confirmed he is appealing discharge. CM met with patient at bedside to confirm patient's choice. CM made patient aware that his sister Alicja did start the appeal process with [employer], CM explained the process to patient and made him aware CM will update again as soon as a determination is received. Patient verbalized his understanding.     CM attempted to call Alicja to provide another update however voicemail was full and CM was unable to leave a message. CM cancelled requested [facility] transportation in [place].

## 2025-06-11 NOTE — CASE MANAGEMENT
Case Management Progress Note    Patient name Naresh Carpio  Location [address] [address]-* MRN [id number]  : 1960 Date 2025       LOS (days): 6  Geometric Mean LOS (GMLOS) (days): 3.6  Days to GMLOS:-2.5        OBJECTIVE:    Current admission status: Inpatient  Pref[facility] Pharmacy:   [facility] - [place], [place] - [address] [facility] [address]  [address] [address] [facility] [address]  [facility] [address]  Phone: [phone] Fax: [phone]    [facility] [address] - [place], [place] - [address]  [address] [place] [address]  Phone: [phone] Fax: [phone]    Primary Care Provider: Jeffrey Jackson    Primary Insurance: MEDICARE  Secondary Insurance:     PROGRESS NOTE:    CM emailed [facility] [employer] Appeal document request and IMM and DND ([email or web address]) and sent CMDS consult request in [facility].

## 2025-06-11 NOTE — ASSESSMENT & PLAN NOTE
Per prior note, patient to utilize LSO brace  Ongoing PT/OT evaluations with recommendation of skilled rehab

## 2025-06-11 NOTE — ASSESSMENT & PLAN NOTE
BP stable and is at goal.  Home Rx: Nifedipine 30 mg daily, furosemide 20 mg twice a day.  Current Rx: Metoprolol succ 25 mg BID.  Nifedipine 30 mg daily which was started on 6/9.  Metoprolol added by cards due to NSTEMI.   Blood pressure stable, continue current treatment

## 2025-06-11 NOTE — ASSESSMENT & PLAN NOTE
-Due to impaired free water excretion, sodium was 130 on last blood work yesterday, continue UF with HD, continue fluid restriction

## 2025-06-11 NOTE — ASSESSMENT & PLAN NOTE
This is chronic and stable and improving with more UF with HD.  Continue UF with HD, edema has improved with decreasing target weight.  Continue UF with HD

## 2025-06-12 ENCOUNTER — APPOINTMENT (INPATIENT)
Dept: DIALYSIS | Facility: HOSPITAL | Age: 65
DRG: 177 | End: 2025-06-12
Payer: MEDICARE

## 2025-06-12 PROBLEM — R82.90 ABNORMAL URINALYSIS: Status: ACTIVE | Noted: 2025-06-12

## 2025-06-12 LAB
ANION GAP SERPL CALCULATED.3IONS-SCNC: 14 MMOL/L (ref 4–13)
BASOPHILS # BLD AUTO: 0.02 THOUSANDS/ÂΜL (ref 0–0.1)
BASOPHILS NFR BLD AUTO: 0 % (ref 0–1)
BUN SERPL-MCNC: 49 MG/DL (ref 5–25)
CALCIUM SERPL-MCNC: 8.1 MG/DL (ref 8.4–10.2)
CHLORIDE SERPL-SCNC: 95 MMOL/L (ref 96–108)
CO2 SERPL-SCNC: 22 MMOL/L (ref 21–32)
CREAT SERPL-MCNC: 5.99 MG/DL (ref 0.6–1.3)
EOSINOPHIL # BLD AUTO: 0.51 THOUSAND/ÂΜL (ref 0–0.61)
EOSINOPHIL NFR BLD AUTO: 6 % (ref 0–6)
ERYTHROCYTE [DISTWIDTH] IN BLOOD BY AUTOMATED COUNT: 14.8 % (ref 11.6–15.1)
GFR SERPL CREATININE-BSD FRML MDRD: 9 ML/MIN/1.73SQ M
GLUCOSE SERPL-MCNC: 143 MG/DL (ref 65–140)
GLUCOSE SERPL-MCNC: 146 MG/DL (ref 65–140)
GLUCOSE SERPL-MCNC: 151 MG/DL (ref 65–140)
GLUCOSE SERPL-MCNC: 181 MG/DL (ref 65–140)
GLUCOSE SERPL-MCNC: 263 MG/DL (ref 65–140)
HCT VFR BLD AUTO: 30.6 % (ref 36.5–49.3)
HGB BLD-MCNC: 9.4 G/DL (ref 12–17)
IMM GRANULOCYTES # BLD AUTO: 0.05 THOUSAND/UL (ref 0–0.2)
IMM GRANULOCYTES NFR BLD AUTO: 1 % (ref 0–2)
LYMPHOCYTES # BLD AUTO: 1.53 THOUSANDS/ÂΜL (ref 0.6–4.47)
LYMPHOCYTES NFR BLD AUTO: 19 % (ref 14–44)
MCH RBC QN AUTO: 28.8 PG (ref 26.8–34.3)
MCHC RBC AUTO-ENTMCNC: 30.7 G/DL (ref 31.4–37.4)
MCV RBC AUTO: 94 FL (ref 82–98)
MONOCYTES # BLD AUTO: 0.97 THOUSAND/ÂΜL (ref 0.17–1.22)
MONOCYTES NFR BLD AUTO: 12 % (ref 4–12)
NEUTROPHILS # BLD AUTO: 4.98 THOUSANDS/ÂΜL (ref 1.85–7.62)
NEUTS SEG NFR BLD AUTO: 62 % (ref 43–75)
NRBC BLD AUTO-RTO: 0 /100 WBCS
PLATELET # BLD AUTO: 223 THOUSANDS/UL (ref 149–390)
PMV BLD AUTO: 10.1 FL (ref 8.9–12.7)
POTASSIUM SERPL-SCNC: 4.3 MMOL/L (ref 3.5–5.3)
RBC # BLD AUTO: 3.26 MILLION/UL (ref 3.88–5.62)
SODIUM SERPL-SCNC: 131 MMOL/L (ref 135–147)
WBC # BLD AUTO: 8.06 THOUSAND/UL (ref 4.31–10.16)

## 2025-06-12 PROCEDURE — 94660 CPAP INITIATION&MGMT: CPT

## 2025-06-12 PROCEDURE — 99232 SBSQ HOSP IP/OBS MODERATE 35: CPT | Performed by: INTERNAL MEDICINE

## 2025-06-12 PROCEDURE — 85025 COMPLETE CBC W/AUTO DIFF WBC: CPT | Performed by: INTERNAL MEDICINE

## 2025-06-12 PROCEDURE — 82948 REAGENT STRIP/BLOOD GLUCOSE: CPT

## 2025-06-12 PROCEDURE — 80048 BASIC METABOLIC PNL TOTAL CA: CPT | Performed by: INTERNAL MEDICINE

## 2025-06-12 RX ADMIN — HYDROMORPHONE HYDROCHLORIDE 0.2 MG: 0.2 INJECTION, SOLUTION INTRAMUSCULAR; INTRAVENOUS; SUBCUTANEOUS at 06:05

## 2025-06-12 RX ADMIN — SEVELAMER HYDROCHLORIDE 1600 MG: 800 TABLET, FILM COATED ORAL at 08:20

## 2025-06-12 RX ADMIN — HEPARIN SODIUM 5000 UNITS: 5000 INJECTION INTRAVENOUS; SUBCUTANEOUS at 21:40

## 2025-06-12 RX ADMIN — SEVELAMER HYDROCHLORIDE 1600 MG: 800 TABLET, FILM COATED ORAL at 16:43

## 2025-06-12 RX ADMIN — INSULIN LISPRO 2 UNITS: 100 INJECTION, SOLUTION INTRAVENOUS; SUBCUTANEOUS at 11:43

## 2025-06-12 RX ADMIN — Medication 2.5 MG: at 19:19

## 2025-06-12 RX ADMIN — HYDROMORPHONE HYDROCHLORIDE 0.2 MG: 0.2 INJECTION, SOLUTION INTRAMUSCULAR; INTRAVENOUS; SUBCUTANEOUS at 22:35

## 2025-06-12 RX ADMIN — NIFEDIPINE 30 MG: 30 TABLET, EXTENDED RELEASE ORAL at 08:19

## 2025-06-12 RX ADMIN — ATORVASTATIN CALCIUM 80 MG: 80 TABLET, FILM COATED ORAL at 16:43

## 2025-06-12 RX ADMIN — METOPROLOL SUCCINATE 25 MG: 25 TABLET, EXTENDED RELEASE ORAL at 08:19

## 2025-06-12 RX ADMIN — SENNOSIDES 8.6 MG: 8.6 TABLET, FILM COATED ORAL at 11:43

## 2025-06-12 RX ADMIN — METOPROLOL SUCCINATE 25 MG: 25 TABLET, EXTENDED RELEASE ORAL at 21:40

## 2025-06-12 RX ADMIN — Medication 2.5 MG: at 10:20

## 2025-06-12 RX ADMIN — Medication 2.5 MG: at 00:14

## 2025-06-12 RX ADMIN — HYDROMORPHONE HYDROCHLORIDE 0.2 MG: 0.2 INJECTION, SOLUTION INTRAMUSCULAR; INTRAVENOUS; SUBCUTANEOUS at 16:40

## 2025-06-12 RX ADMIN — FAMOTIDINE 20 MG: 20 TABLET, FILM COATED ORAL at 21:40

## 2025-06-12 RX ADMIN — SEVELAMER HYDROCHLORIDE 1600 MG: 800 TABLET, FILM COATED ORAL at 11:43

## 2025-06-12 RX ADMIN — ASPIRIN 81 MG 81 MG: 81 TABLET ORAL at 08:20

## 2025-06-12 RX ADMIN — LIDOCAINE 5% 1 PATCH: 700 PATCH TOPICAL at 08:20

## 2025-06-12 RX ADMIN — ALLOPURINOL 100 MG: 100 TABLET ORAL at 14:42

## 2025-06-12 RX ADMIN — HEPARIN SODIUM 5000 UNITS: 5000 INJECTION INTRAVENOUS; SUBCUTANEOUS at 08:20

## 2025-06-12 RX ADMIN — INSULIN LISPRO 1 UNITS: 100 INJECTION, SOLUTION INTRAVENOUS; SUBCUTANEOUS at 16:43

## 2025-06-12 NOTE — ASSESSMENT & PLAN NOTE
[facility] TTS  Access: R IJ permcath  EDW 94.7 kg.  Last postdialysis weight was 89.7 kg.  Decrease target weight to 89.5 kg and will continue to challenge  Had urgent HD treatment on Wednesday, 6/4/25, due to hyperkalemia and fluid overload.     Status post intermittent HD on 6/10 with postdialysis weight 88.3 kg.  1000 mL of fluid was removed.   Patient does have slight worsening lower extremity edema, plan for intermittent HD today with UF.  Continue to monitor volume status

## 2025-06-12 NOTE — PLAN OF CARE
Post-Dialysis RN Treatment Note    Blood Pressure:  Pre 128/51 mm/Hg  Post 139/58 mmHg   EDW:  89.5 kg    Weight:  Pre 89.7 kg   Post 87.7 kg   Mode of weight measurement: Standing Scale   Volume Removed:  2000 ml    Treatment duration: 210 minutes    NS given:  0    Treatment shortened Yes, describe: Late start   Medications given during Rx: Not Applicable   Estimated Kt/V:  NA   Access type: Permacath/TDC   Needle Gauge: NA   Access Issues: No    Report called to primary nurse:   Yes Marcelino Schwartz        Goal of treatment is the removal of 2.0 kg fluid providing patient is able to tolerate.  Clearance of renal toxins and wastes.    Problem: METABOLIC, FLUID AND ELECTROLYTES - ADULT  Goal: Electrolytes maintained within normal limits  Description: INTERVENTIONS:  - Monitor labs and assess patient for signs and symptoms of electrolyte imbalances  - Administer electrolyte replacement as ordered  - Monitor response to electrolyte replacements, including repeat lab results as appropriate  - Instruct patient on fluid and nutrition as appropriate  Outcome: Progressing  Goal: Fluid balance maintained  Description: INTERVENTIONS:  - Monitor labs   - Monitor I/O and WT  - Instruct patient on fluid and nutrition as appropriate  - Assess for signs & symptoms of volume excess or deficit  Outcome: Progressing

## 2025-06-12 NOTE — ASSESSMENT & PLAN NOTE
As noted by tachycardia, tachypnea due to volume overload.  Lactic acid within normal limits.  No obvious signs of infection, hold off on antibiotics  Blood cultures drawn in the ER, are negative

## 2025-06-12 NOTE — ASSESSMENT & PLAN NOTE
ADDENDUM -> added to dc summary on 6/12 -> Early morning on 6/11 (2:52 AM), an order was placed (unknown provider) for urinalysis that reflex to culture growing Enterococcus faecalis -> no clinical signs of infection at this time as prior altered mentation has improved/resolved without infectious treatment   Continue to monitor off any antibiotics

## 2025-06-12 NOTE — CASE MANAGEMENT
Case Management Discharge Planning Note    Patient name Naresh Carpio  Location [address] [place] [address]/[address] [address]-* MRN [id number]  : 1960 Date 2025       Current Admission Date: 2025  Current Admission Diagnosis:Acute respiratory failure with hypoxia ([facility])   Patient Active Problem List    Diagnosis Date Noted    Abnormal urinalysis 2025    Metabolic encephalopathy 2025    Proliferative diabetic retinopathy of both eyes with macular edema associated with type 2 diabetes mellitus ([facility]) 2025    COVID-19 2025    Bilateral leg edema 2025    Ambulatory dysfunction 2025    Retinopathy 2025    At risk for acid-base imbalance 2025    Electrolyte imbalance risk 2025    Hallucinations 2025    Type 2 diabetes mellitus with foot ulcer, without long-term current use of insulin ([facility]) 2025    Insomnia 2025    Hyperphosphatemia 2025    Wound of skin 2025    At high risk for skin breakdown 2025    At risk for venous thromboembolism (VTE) 2025    Impaired mobility and activities of daily living 2025    Visual disturbance 2025    Pseudoaneurysm (HCC) 2025    Acute pain due to trauma 2025    ESRD on dialysis ([facility]) 2025    Wounds, multiple 2025    Traumatic retroperitoneal hemorrhage 2025    Secondary hyperparathyroidism ([facility]) 2025    At risk for electrolyte imbalance 2025    Multiple open wounds of foot 2025    Disorder of acid-base balance 2025    L2 vertebral fracture ([facility]) 2025    Non-compliance with treatment 2025    Generalized weakness 2025    Gout 2025    Closed fracture of proximal end of left humerus with routine healing 02/10/2025    Left shoulder pain 2025    ESRD (end stage renal disease) on dialysis ([facility]) 2025    Primary hypertension 2025    Anemia in ESRD (end-stage renal disease)  ([facility]) 2025    Chronic  kidney disease-mineral and bone disorder 01/16/2025    Renal lesion 12/19/2024    Chronic wound 11/20/2024    Acute respiratory failure with hypoxia ([facility]) 11/19/2024    Volume overload secondary to noncompliance with hemodialysis 11/19/2024    Pleural effusion 11/19/2024    Elevated troponin 11/19/2024    Paroxysmal atrial fibrillation ([facility])     ESRD (end stage renal disease) ([facility]) 10/25/2024    Hyponatremia 10/19/2024    Acute on chronic anemia 10/14/2024    PAD (peripheral artery disease) ([facility]) 10/08/2024    Hyperkalemia 04/06/2024    Difficulty with speech 03/19/2024    History of amputation of hallux ([facility]) 03/18/2024    At risk for constipation 09/06/2023    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 mellitus, with necrosis of bone ([facility])     Acquired deformity of foot, right     Charcot's joint     Open wound of right foot 08/21/2023    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 mellitus, with bone involvement without evidence of necrosis ([facility])     Diabetic ulcer of left midfoot associated with type 2 diabetes mellitus, limited to breakdown of skin ([facility])     Diabetic polyneuropathy associated with type 2 diabetes mellitus ([facility])     History of amputation of lesser toe of left foot ([facility])     Onychomycosis     Traumatic retroperitoneal hematoma 08/19/2023    Osteoarthritis of left hip 07/27/2022    Severe Left Orchalgia 07/26/2022    Right shoulder pain 07/26/2022    SIRS (systemic inflammatory response syndrome) ([facility]) 07/26/2022    Left hip pain 07/06/2022    Hemodialysis status ([facility])     Hypervolemia     Chronic anemia 01/21/2022    History of prediabetes     Essential hypertension     History of TIA (transient ischemic attack)     T10 vertebral fracture ([facility])       LOS (days): 7  Geometric Mean LOS (GMLOS) (days): 3.6  Days to GMLOS:-3.6     OBJECTIVE:  Risk of Unplanned Readmission Score: 74.92         Current admission status: Inpatient   Preferred Pharmacy:   [facility] [address] - [place], [place] - [address]  [address]  [facility] [address]  Phone: [phone] Fax: [phone]    [facility] [place], [place] - [address]  [address]  [facility] [address]  Phone: [phone] Fax: [phone]    Primary Care Provider: Jeffrey Jackson    Primary Insurance: MEDICARE  Secondary Insurance:     DISCHARGE DETAILS:    Discharge planning discussed with:: Patient and sister Alicja (by phone)  Freedom of Choice: Yes    Comments - Freedom of Choice: Patient and sister made aware that patient's discharge determination has been upheld by [facility] and sister confirmed that she was contacted by [facility].  Plan is for discharge to [facility] tomorrow morning and [facility] transport is scheduled for 1000.  Provider and nursing notified of pickup time and facility notified in [facility].      CM contacted family/caregiver?: Yes  Were Treatment Team discharge recommendations reviewed with patient/caregiver?: Yes  Did patient/caregiver verbalize understanding of patient care needs?: N/A- going to facility  Were patient/caregiver advised of the risks associated with not following Treatment Team discharge recommendations?: Yes    Contacts  Patient Contacts: Alicja Carpio (sister)  Relationship to Patient:: Family  Contact Method: Phone  Phone Number: [phone]  Reason/Outcome: Discharge Planning, Emergency Contact    Requested Home Health Care         Is the patient interested in HHC at discharge?: No    DME Referral Provided  Referral made for DME?: No    Other Referral/Resources/Interventions Provided:  Interventions: Short Term Rehab, Transportation    Would you like to participate in our [facility] service program?  : No - Declined    Treatment Team Recommendation: Short Term Rehab, SNF  Expected Discharge Disposition: Skilled Nursing Facility     Transport at Discharge : Wheelchair van     Number/Name of Dispatcher: [facility] via  [employer]  Transported by (Company and Unit #): [employer]  ETA of Transport (Date): 06/13/25  ETA of Transport (Time): 1000        [facility]  Receiving Facility/Agency Phone Number: [phone]

## 2025-06-12 NOTE — PLAN OF CARE
Problem: METABOLIC, FLUID AND ELECTROLYTES - ADULT  Goal: Electrolytes maintained within normal limits  Description: INTERVENTIONS:  - Monitor labs and assess patient for signs and symptoms of electrolyte imbalances  - Administer electrolyte replacement as ordered  - Monitor response to electrolyte replacements, including repeat lab results as appropriate  - Instruct patient on fluid and nutrition as appropriate  Outcome: Progressing     Problem: METABOLIC, FLUID AND ELECTROLYTES - ADULT  Goal: Fluid balance maintained  Description: INTERVENTIONS:  - Monitor labs   - Monitor I/O and WT  - Instruct patient on fluid and nutrition as appropriate  - Assess for signs & symptoms of volume excess or deficit  Outcome: Progressing     Problem: RESPIRATORY - ADULT  Goal: Achieves optimal ventilation and oxygenation  Description: INTERVENTIONS:  - Assess for changes in respiratory status  - Assess for changes in mentation and behavior  - Position to facilitate oxygenation and minimize respiratory effort  - Oxygen administered by appropriate delivery if ordered  - Initiate smoking cessation education as indicated  - Encourage broncho-pulmonary hygiene including cough, deep breathe, Incentive Spirometry  - Assess the need for suctioning and aspirate as needed  - Assess and instruct to report SOB or any respiratory difficulty  - Respiratory Therapy support as indicated  Outcome: Progressing     Problem: PAIN - ADULT  Goal: Verbalizes/displays adequate comfort level or baseline comfort level  Description: Interventions:  - Encourage patient to monitor pain and request assistance  - Assess pain using appropriate pain scale  - Administer analgesics as ordered based on type and severity of pain and evaluate response  - Implement non-pharmacological measures as appropriate and evaluate response  - Consider cultural and social influences on pain and pain management  - Notify physician/advanced practitioner if interventions  unsuccessful or patient reports new pain  - Educate patient/family on pain management process including their role and importance of  reporting pain   - Provide non-pharmacologic/complimentary pain relief interventions  Outcome: Progressing     Problem: INFECTION - ADULT  Goal: Absence or prevention of progression during hospitalization  Description: INTERVENTIONS:  - Assess and monitor for signs and symptoms of infection  - Monitor lab/diagnostic results  - Monitor all insertion sites, i.e. indwelling lines, tubes, and drains  - Monitor endotracheal if appropriate and nasal secretions for changes in amount and color  - [place] appropriate cooling/warming therapies per order  - Administer medications as ordered  - Instruct and encourage patient and family to use good hand hygiene technique  - Identify and instruct in appropriate isolation precautions for identified infection/condition  Outcome: Progressing     Problem: SAFETY ADULT  Goal: Patient will remain free of falls  Description: INTERVENTIONS:  - Educate patient/family on patient safety including physical limitations  - Instruct patient to call for assistance with activity   - Consider consulting OT/PT to assist with strengthening/mobility based on AM PAC & JH-HLM score  - Consult OT/PT to assist with strengthening/mobility   - Keep Call bell within reach  - Keep bed low and locked with side rails adjusted as appropriate  - Keep care items and personal belongings within reach  - Initiate and maintain comfort rounds  - Make Fall Risk Sign visible to staff  - Offer Toileting every 2 Hours, in advance of need  - Initiate/Maintain bed alarm  - Obtain necessary fall risk management equipment  - Apply yellow socks and bracelet for high fall risk patients  - Consider moving patient to room near nurses station  Outcome: Progressing     Problem: Nutrition/Hydration-ADULT  Goal: Nutrient/Hydration intake appropriate for improving, restoring or maintaining nutritional  needs  Description: Monitor and assess patient's nutrition/hydration status for malnutrition. Collaborate with interdisciplinary team and initiate plan and interventions as ordered.  Monitor patient's weight and dietary intake as ordered or per policy. Utilize nutrition screening tool and intervene as necessary. Determine patient's food preferences and provide high-protein, high-caloric foods as appropriate.     INTERVENTIONS:  - Monitor oral intake, urinary output, labs, and treatment plans  - Assess nutrition and hydration status and recommend course of action  - Evaluate amount of meals eaten  - Assist patient with eating if necessary   - Allow adequate time for meals  - Recommend/ encourage appropriate diets, oral nutritional supplements, and vitamin/mineral supplements  - Order, calculate, and assess calorie counts as needed  - Recommend, monitor, and adjust tube feedings and TPN/PPN based on assessed needs  - Assess need for intravenous fluids  - Provide specific nutrition/hydration education as appropriate  - Include patient/family/caregiver in decisions related to nutrition  Outcome: Progressing

## 2025-06-12 NOTE — ASSESSMENT & PLAN NOTE
-Due to impaired free water excretion, sodium was 131 on last blood work, continue UF with HD, continue fluid restriction

## 2025-06-12 NOTE — ASSESSMENT & PLAN NOTE
Early morning on 6/11 (2:52 AM), an order was placed (unknown provider) for urinalysis that reflex to culture growing Enterococcus faecalis -> no clinical signs of infection at this time as prior altered mentation has improved/resolved without infectious treatment   Continue to monitor off any antibiotics

## 2025-06-12 NOTE — ASSESSMENT & PLAN NOTE
BP stable and is at goal  Home Rx: Nifedipine 30 mg daily, furosemide 20 mg twice a day.  Current Rx: Metoprolol succ 25 mg BID.  Nifedipine 30 mg daily which was started on 6/9.  Metoprolol added by cards due to NSTEMI.   BP stable continue current medications

## 2025-06-12 NOTE — PROGRESS NOTES
Progress Note - Nephrology   Name: Naresh Carpio 64 y.o. male I MRN: [id number]  Unit/Bed#: [address] [place] [address] I Date of Admission: 6/4/2025   Date of Service: 6/12/2025 I Hospital Day: 7    Assessment & Plan  ESRD (end stage renal disease) (Formerly [facility])  [facility] TTS  Access: R IJ permcath  EDW 94.7 kg.  Last postdialysis weight was 89.7 kg.  Decrease target weight to 89.5 kg and will continue to challenge  Had urgent HD treatment on Wednesday, 6/4/25, due to hyperkalemia and fluid overload.     Status post intermittent HD on 6/10 with postdialysis weight 88.3 kg.  1000 mL of fluid was removed.   Patient does have slight worsening lower extremity edema, plan for intermittent HD today with UF.  Continue to monitor volume status  Acute respiratory failure with hypoxia (Formerly [facility])  Felt to be due to fluid overload + infectious etiology.  Chest x-ray was suggestive of pulmonary edema. COVID positive on admission.   Of note, troponin levels elevated and cards recommended cardiac cath which patient is refusing.   Continue management of COVID per primary team    Essential hypertension  BP stable and is at goal  Home Rx: Nifedipine 30 mg daily, furosemide 20 mg twice a day.  Current Rx: Metoprolol succ 25 mg BID.  Nifedipine 30 mg daily which was started on 6/9.  Metoprolol added by cards due to NSTEMI.   BP stable continue current medications    Volume overload secondary to noncompliance with hemodialysis  10/9/24 Echo EF 60-65%, G1DD  Volume status has improved with UF with HD, decreased target weight.  Continue UF with HD, may need to decrease target weight further if tolerated  Bilateral leg edema  This is chronic and stable and improving with more UF with HD.  Continue UF with HD, edema has improved with decreasing target weight.  Continue UF with HD    Anemia in ESRD (end-stage renal disease)  (Formerly [facility])  Hgb is below goal at 9.4 g/dL, stable  He is on Mircera 150 mcg every 2 weeks and Venofer.  Continue to monitor hemoglobin. no  need for SURINDER for now    Chronic kidney disease-mineral and bone disorder  He is not on calcitriol in the outpatient setting.  Home Rx: Sevelamer.   Continue Sevelamer 1600 mg TID and low phos diet. Phos at goal.  Last phosphorus was 3.5    Elevated troponin  Cardiology consulted.  Patient is refusing cardiac catheterization.      COVID-19  On remdesivir and dexamethasone per primary service.  Hyponatremia  -Due to impaired free water excretion, sodium was 131 on last blood work, continue UF with HD, continue fluid restriction  Metabolic encephalopathy  Mental status has been fluctuating, patient is more awake alert, complaining of back pain.    I have reviewed the nephrology recommendations including plan for hemodialysis treatment today and stable for discharge from nephrology side, with primary team, and we are in agreement with renal plan including the information outlined above.     Subjective   Denies any new complaints, no shortness of breath or chest pain    Objective :  Temp:  [97.8 °F (36.6 °C)-98 °F (36.7 °C)] 98 °F (36.7 °C)  HR:  [52-68] 68  BP: (131-141)/(53-62) 141/62  Resp:  [18-20] 18  SpO2:  [96 %-98 %] 98 %  O2 Device: None (Room air)    Current Weight: Weight - Scale: 89.7 kg (197 lb 12 oz)  First Weight: Weight - Scale: 90.8 kg (200 lb 2.8 oz)  I/O         06/10 0701  06/11 0700 06/11 0701  06/12 0700 06/12 0701  06/13 0700    P.O. 700 500     I.V. (mL/kg) 520 (5.8) 10 (0.1)     Total Intake(mL/kg) 1220 (13.6) 510 (5.7)     Urine (mL/kg/hr) 700 (0.3) 450 (0.2)     Other 2000      Total Output 2700 450     Net -1480 +60                  Physical Exam  General:  Ill looking, awake.  Eyes: Conjunctivae pink,  Sclera anicteric  ENT: lips and mucous membranes moist  Neck: supple   Chest: Clear to Auscultation both lungs,  no crackles, ronchus or wheezing.  CVS: S1 & S2 present, normal rate, regular rhythm, no murmur.  Abdomen: soft, non-tender, non-distended, Bowel sounds normoactive  Extremities: 1+  "edema both legs  Skin: no rash  Neuro: awake, alert, oriented x 3   Psych: Mood and affect appropriate        Medications:  Current Medications[1]      Lab Results: I have reviewed the following results:  Results from last 7 days   Lab Units 06/12/25  0555 06/11/25  0551 06/10/25  0939 06/09/25  0438 06/09/25  0032 06/08/25  0519 06/07/25  0452 06/06/25  0518   WBC Thousand/uL 8.06 7.54 8.57 9.86 7.26 6.13 4.18* 4.13*   HEMOGLOBIN g/dL 9.4* 9.3* 8.7* 9.9* 9.2* 9.0* 8.9* 8.7*   HEMATOCRIT % 30.6* 29.2* 27.2* 31.3* 28.9* 28.8* 28.9* 28.4*   PLATELETS Thousands/uL 223 178 181 192 192 180 169 196   POTASSIUM mmol/L 4.3 4.2 4.5 4.3 4.5 4.3 4.3 4.3   CHLORIDE mmol/L 95* 94* 93* 92* 90* 91* 93* 95*   CO2 mmol/L 22 24 22 25 23 24 23 27   BUN mg/dL 49* 35* 67* 53* 51* 38* 51* 34*   CREATININE mg/dL 5.99* 4.55* 6.71* 5.15* 4.84* 3.93* 5.22* 4.11*   CALCIUM mg/dL 8.1* 7.9* 7.9* 8.7 8.7 8.5 8.5 8.4   MAGNESIUM mg/dL  --   --  2.0 1.9 1.9 1.9 2.1 2.0   PHOSPHORUS mg/dL  --   --   --   --  3.5  --   --   --    ALBUMIN g/dL  --   --   --   --  3.8  --   --   --        Administrative Statements     Portions of the record may have been created with voice recognition software. Occasional wrong word or \"sound a like\" substitutions may have occurred due to the inherent limitations of voice recognition software. Read the chart carefully and recognize, using context, where substitutions have occurred.If you have any questions, please contact the dictating provider.       [1]   Current Facility-Administered Medications:     acetaminophen (TYLENOL) tablet 650 mg, 650 mg, Oral, Q6H PRN, Lore Silver DO, 650 mg at 06/08/25 2055    allopurinol (ZYLOPRIM) tablet 100 mg, 100 mg, Oral, Once per day on Tuesday Thursday Saturday, Lore Silver DO, 100 mg at 06/10/25 1600    aspirin chewable tablet 81 mg, 81 mg, Oral, Daily, Lore Silver DO, 81 mg at 06/12/25 0820    atorvastatin (LIPITOR) tablet 80 mg, 80 mg, Oral, Daily With Dinner, " Lore Silver DO, 80 mg at 06/11/25 1723    calcium carbonate (TUMS) chewable tablet 500 mg, 500 mg, Oral, BID PRN, GRANT Rich, 500 mg at 06/06/25 0113    famotidine (PEPCID) tablet 20 mg, 20 mg, Oral, HS, GRANT Rich, 20 mg at 06/11/25 2116    gabapentin (NEURONTIN) capsule 100 mg, 100 mg, Oral, Once per day on Monday Wednesday Friday, Milan Ruiz MD, 100 mg at 06/11/25 0834    heparin (porcine) subcutaneous injection 5,000 Units, 5,000 Units, Subcutaneous, Q12H KEMAL, Milan Ruiz MD, 5,000 Units at 06/12/25 0820    hydrALAZINE (APRESOLINE) injection 5 mg, 5 mg, Intravenous, Q6H PRN, Allegra Carrero MD    HYDROmorphone HCl (DILAUDID) injection 0.2 mg, 0.2 mg, Intravenous, Q4H PRN, GRANT Rich, 0.2 mg at 06/12/25 0605    insulin lispro (HumALOG/ADMELOG) 100 units/mL subcutaneous injection 1-5 Units, 1-5 Units, Subcutaneous, TID AC, 1 Units at 06/08/25 1622 **AND** Fingerstick Glucose (POCT), , , TID AC, Lore Silver DO    ipratropium-albuterol (DUO-NEB) 0.5-2.5 mg/3 mL inhalation solution 3 mL, 3 mL, Nebulization, Q4H PRN, Milan Ruiz MD    lidocaine (LIDODERM) 5 % patch 1 patch, 1 patch, Topical, Daily, Lore Silver DO, 1 patch at 06/12/25 0820    melatonin tablet 3 mg, 3 mg, Oral, HS PRN, GRANT Church, 3 mg at 06/08/25 2056    metoprolol succinate (TOPROL-XL) 24 hr tablet 25 mg, 25 mg, Oral, Q12H KEMAL, RAMZE ShellNP, 25 mg at 06/12/25 0819    NIFEdipine (PROCARDIA XL) 24 hr tablet 30 mg, 30 mg, Oral, Daily, Bradley Beaulieu MD, 30 mg at 06/12/25 0819    oxyCODONE (ROXICODONE) split tablet 2.5 mg, 2.5 mg, Oral, Q8H PRN, GRANT Rich, 2.5 mg at 06/12/25 0014    senna (SENOKOT) tablet 8.6 mg, 8.6 mg, Oral, Daily With Lunch, Lore Silver DO, 8.6 mg at 06/11/25 1300    sevelamer (RENAGEL) tablet 1,600 mg, 1,600 mg, Oral, TID With Meals, Lore Silver DO, 1,600 mg at 06/12/25 0820

## 2025-06-12 NOTE — ASSESSMENT & PLAN NOTE
Waxing/waning earlier in hospital course, but generally returned to baseline  CT imaging negative for acute intracranial etiology - noted, however, a density in the right lobe, possibly sequela of prior retinal detachment

## 2025-06-12 NOTE — ASSESSMENT & PLAN NOTE
Hgb is below goal at 9.4 g/dL, stable  He is on Mircera 150 mcg every 2 weeks and Venofer.  Continue to monitor hemoglobin. no need for SURINDER for now

## 2025-06-12 NOTE — ASSESSMENT & PLAN NOTE
Patient with significant troponin elevation greater than 22,973, now trending down.  No chest pain.  Concern for NSTEMI.  Initially treated with IV heparin now discontinued per recommendations from cardiology.  Patient adamantly refused cardiac catheterization as recommended by cardiology.

## 2025-06-12 NOTE — DISCHARGE SUPPORT
Case Management Assessment & Discharge Planning Note    Patient name Naresh Carpio  Location [address] [address]/[address] [place] [address]-* MRN [id number]  : 1960 Date 2025       Current Admission Date: 2025  Current Admission Diagnosis:Acute respiratory failure with hypoxia (HCC)   Patient Active Problem List    Diagnosis Date Noted    Metabolic encephalopathy 2025    Proliferative diabetic retinopathy of both eyes with macular edema associated with type 2 diabetes mellitus (HCC) 2025    COVID-19 2025    Bilateral leg edema 2025    Ambulatory dysfunction 2025    Retinopathy 2025    At risk for acid-base imbalance 2025    Electrolyte imbalance risk 2025    Hallucinations 2025    Type 2 diabetes mellitus with foot ulcer, without long-term current use of insulin ([facility]) 2025    Insomnia 2025    Hyperphosphatemia 2025    Wound of skin 2025    At high risk for skin breakdown 2025    At risk for venous thromboembolism (VTE) 2025    Impaired mobility and activities of daily living 2025    Visual disturbance 2025    Pseudoaneurysm (HCC) 2025    Acute pain due to trauma 2025    ESRD on dialysis ([facility]) 2025    Wounds, multiple 2025    Traumatic retroperitoneal hemorrhage 2025    Secondary hyperparathyroidism ([facility]) 2025    At risk for electrolyte imbalance 2025    Multiple open wounds of foot 2025    Disorder of acid-base balance 2025    L2 vertebral fracture ([facility]) 2025    Non-compliance with treatment 2025    Generalized weakness 2025    Gout 2025    Closed fracture of proximal end of left humerus with routine healing 02/10/2025    Left shoulder pain 2025    ESRD (end stage renal disease) on dialysis ([facility]) 2025    Primary hypertension 2025    Anemia in ESRD (end-stage renal disease)  ([facility]) 2025    Chronic kidney  disease-mineral and bone disorder 01/16/2025    Renal lesion 12/19/2024    Chronic wound 11/20/2024    Acute respiratory failure with hypoxia ([facility]) 11/19/2024    Volume overload secondary to noncompliance with hemodialysis 11/19/2024    Pleural effusion 11/19/2024    Elevated troponin 11/19/2024    Paroxysmal atrial fibrillation ([facility])     ESRD (end stage renal disease) ([facility]) 10/25/2024    Hyponatremia 10/19/2024    Acute on chronic anemia 10/14/2024    PAD (peripheral artery disease) ([facility]) 10/08/2024    Hyperkalemia 04/06/2024    Difficulty with speech 03/19/2024    History of amputation of hallux ([facility]) 03/18/2024    At risk for constipation 09/06/2023    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 mellitus, with necrosis of bone ([facility])     Acquired deformity of foot, right     Charcot's joint     Open wound of right foot 08/21/2023    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 mellitus, with bone involvement without evidence of necrosis ([facility])     Diabetic ulcer of left midfoot associated with type 2 diabetes mellitus, limited to breakdown of skin ([facility])     Diabetic polyneuropathy associated with type 2 diabetes mellitus ([facility])     History of amputation of lesser toe of left foot ([facility])     Onychomycosis     Traumatic retroperitoneal hematoma 08/19/2023    Osteoarthritis of left hip 07/27/2022    Severe Left Orchalgia 07/26/2022    Right shoulder pain 07/26/2022    SIRS (systemic inflammatory response syndrome) ([facility]) 07/26/2022    Left hip pain 07/06/2022    Hemodialysis status ([facility])     Hypervolemia     Chronic anemia 01/21/2022    History of prediabetes     Essential hypertension     History of TIA (transient ischemic attack)     T10 vertebral fracture ([facility])       LOS (days): 7  Geometric Mean LOS (GMLOS) (days): 3.6  Days to GMLOS:-3.6   [employer] Appeal Determination  Determination: Medical Director agrees with term of services  Liable for payment: Beneficiary  Date Liability  Starts: 06/13/25   Notified: Alisson JACOB       Please reach out to CM for updates on any clinical information.

## 2025-06-12 NOTE — PROGRESS NOTES
Progress Note - Hospitalist   Name: Naresh Carpio 64 y.o. male I MRN: [id number]  Unit/Bed#: [address] I Date of Admission: 6/4/2025   Date of Service: 6/12/2025 I Hospital Day: 7    Assessment & Plan  Acute respiratory failure with hypoxia (HCC)  6/4-6/6 - Patient presents from rehab facility due to shortness of breath.  He was noted to be hypoxic to 84% on room air and in respiratory distress by medics  Initially required BiPAP in the ER and currently on 4 L  VBG showed pH of 7.3 with PCO2 of 52 and PO2 of 30  Likely in the setting of volume overload due to noncompliance with dialysis sessions  Chest x-ray appears to be pulmonary edema however official read is pending  Further management with dialysis.  Titrate off oxygen as tolerated  COVID pending    6/7 - Secondary to volume overload from missed hemodialysis.  Continue hemodialysis per recommendations from nephrology.  Patient also found to be positive for COVID-19 which may also be contributing. Overall, respiratory status is improved.    6/8 - Improved. Hemodialysis per nephrology. Plan to complete remdesivir today. Will also discontinue dexamethasone as respiratory status is stabilized and patient is saturating well on room air.    6/9 - Respiratory status remains stable. Continue hemodialysis per recommendations from nephrology.  Patient continues to saturate well on room air.  Status post a course of dexamethasone and remdesivir for COVID.    6/10 - Remains oxygenating on baseline room air.  Off treatment for COVID at this time.  Continue to encourage compliance with outpatient dialysis sessions.  With stability, anticipate discharge in the next 1-2 days.     6/11-6/12 - Denies further shortness of breath at this time, and denies acute respiratory complaints.  Cleared for discharge.  Patient/family initially appealed discharge which has been upheld by insurance -> case management to arrange transport tomorrow.  Volume overload secondary to  noncompliance with hemodialysis  Encourage compliance to outpatient dialysis sessions  Received 80 mg of IV Lasix in the ER for diuresis earlier in hospital course  Ongoing routine hemodialysis per nephrology  COVID-19  Completed Remdesivir course and discontinued Decadron in the setting of return to baseline room air  Airborne/contact precautions  Elevated troponin  Patient with significant troponin elevation greater than 22,973, now trending down.  No chest pain.  Concern for NSTEMI.  Initially treated with IV heparin now discontinued per recommendations from cardiology.  Patient adamantly refused cardiac catheterization as recommended by cardiology.  ESRD (end stage renal disease) (HCC)  Continue routine hemodialysis per nephrology  On Renagel supplementation  SIRS (systemic inflammatory response syndrome) ([facility])  As noted by tachycardia, tachypnea due to volume overload.  Lactic acid within normal limits.  No obvious signs of infection, hold off on antibiotics  Blood cultures drawn in the ER, are negative  Metabolic encephalopathy  Waxing/waning earlier in hospital course, but generally returned to baseline  CT imaging negative for acute intracranial etiology - noted, however, a density in the right lobe, possibly sequela of prior retinal detachment  Abnormal urinalysis  Early morning on 6/11 (2:52 AM), an order was placed (unknown provider) for urinalysis that reflex to culture growing Enterococcus faecalis -> no clinical signs of infection at this time as prior altered mentation has improved/resolved without infectious treatment   Continue to monitor off any antibiotics  Essential hypertension  Continue Procardia/Toprol XL - PRN IV Hydralazine on board for BP spikes during hospital course  Cardiac diet  Paroxysmal atrial fibrillation (HCC)  Rate controlled on Toprol-XL   Per recent discharge summary, patient was refusing Eliquis due to his recent history of retroperitoneal hematoma in April 2025  Bilateral leg  edema  Bilateral leg edema - L > R, no longer on anticoagulation  Bilateral lower extremity venous duplex negative for DVT.  Anemia in ESRD (end-stage renal disease)  (HCC)  Hemoglobin stable  L2 vertebral fracture (HCC)  Per prior note, patient to utilize LSO brace  Ongoing PT/OT evaluations with recommendation of skilled rehab  Hyponatremia  Management via dialysis      VTE Pharmacologic Prophylaxis:  Heparin SC    AM-PAC Basic Mobility:  Basic Mobility Inpatient Raw Score: 17  JH-HLM Goal: 5: Stand one or more mins  JH-HLM Achieved: 5: Stand (1 or more minutes)  JH-HLM Goal achieved. Continue to encourage appropriate mobility.    Patient Centered Rounds:  I have performed bedside rounds and discussed plan of care with nursing today.  Discussions with Specialists or Other Care Team Provider:  see above assessments if applicable    Education and Discussions with Family / Patient:  Patient bedside    Time Spent for Care:  36 minutes. More than 50% of total time spent on counseling and coordination of care, on one or more of the following: performing physical exam; counseling and coordination of care, obtaining or reviewing history, documenting in the medical record, reviewing/ordering tests/medications/procedures, and communicating with other healthcare professionals.    Current Length of Stay: 7 day(s)  Current Patient Status: Inpatient   Certification Statement:  Patient will continue to require additional hospital stay due to assessments as noted above.    Code Status: Level 1 - Full Code      Subjective     Encountered earlier today while undergoing dialysis.  Denies any complaints at this time, however, does state that on dialysis days, he does feel a bit weaker than normal.  Overall, remains hopeful and pleasant spirits.      Objective     Vitals:   Temp (24hrs), Av.9 °F (36.6 °C), Min:97.8 °F (36.6 °C), Max:98 °F (36.7 °C)    Temp:  [97.8 °F (36.6 °C)-98 °F (36.7 °C)] 97.9 °F (36.6 °C)  HR:  [54-68]  59  Resp:  [18-19] 18  BP: (111-141)/(48-62) 135/49  SpO2:  [97 %-98 %] 98 %  Body mass index is 26.09 kg/m².     Input and Output Summary (last 24 hours):       Intake/Output Summary (Last 24 hours) at 6/12/2025 1711  Last data filed at 6/12/2025 1330  Gross per 24 hour   Intake 1210 ml   Output 3050 ml   Net -1840 ml       Physical Exam:     GENERAL Waxing/waning but improved weakness/fatigue   HEAD   Normocephalic - atraumatic   EYES Nonicteric   MOUTH   Mucosa moist   NECK   Supple - full range of motion   CARDIAC   Regular rate/rhythm - S1/S2 positive   PULMONARY Clear bilateral breath sounds without labored respirations currently   ABDOMEN Nontender/nondistended   MUSCULOSKELETAL   Motor strength/range of motion mildly deconditioned   NEUROLOGIC   Alert/oriented at baseline currently   PSYCHIATRIC   Mood/affect stable         Labs & Recent Cultures:  Results from last 7 days   Lab Units 06/12/25  0555   WBC Thousand/uL 8.06   HEMOGLOBIN g/dL 9.4*   HEMATOCRIT % 30.6*   PLATELETS Thousands/uL 223   SEGS PCT % 62   LYMPHO PCT % 19   MONO PCT % 12   EOS PCT % 6     Results from last 7 days   Lab Units 06/12/25  0555 06/09/25  0438 06/09/25  0032   POTASSIUM mmol/L 4.3   < > 4.5   CHLORIDE mmol/L 95*   < > 90*   CO2 mmol/L 22   < > 23   BUN mg/dL 49*   < > 51*   CREATININE mg/dL 5.99*   < > 4.84*   CALCIUM mg/dL 8.1*   < > 8.7   ALK PHOS U/L  --   --  85   ALT U/L  --   --  12   AST U/L  --   --  16    < > = values in this interval not displayed.         Results from last 7 days   Lab Units 06/12/25  1609 06/12/25  1134 06/12/25  0732 06/11/25  2021 06/11/25  1600 06/11/25  1319 06/11/25  0729 06/10/25  2008 06/10/25  1545 06/10/25  1131 06/10/25  0737 06/09/25 2053   POC GLUCOSE mg/dl 181* 263* 143* 132 164* 146* 92 139 116 94 101 160*                 Results from last 7 days   Lab Units 06/11/25  0244   URINE CULTURE  >100,000 cfu/ml Enterococcus faecalis*         Lines/Drains/Telemetry:  Invasive Devices        Peripheral Intravenous Line  Duration             Peripheral IV 06/11/25 Right Antecubital 1 day              Hemodialysis Catheter  Duration             HD Permanent Double Catheter 652 days                    Last 24 Hours Medication List:   Current Facility-Administered Medications   Medication Dose Route Frequency Provider Last Rate    acetaminophen  650 mg Oral Q6H PRN Lore Revankar, DO      allopurinol  100 mg Oral Once per day on Tuesday Thursday Saturday Lore Revankar, DO      aspirin  81 mg Oral Daily Lore Revankar, DO      atorvastatin  80 mg Oral Daily With Dinner Lore Revankar, DO      calcium carbonate  500 mg Oral BID PRN GRANT Rich      famotidine  20 mg Oral HS Cuiyinicolas Yurimelissa, CRАЛЕКСАНДР      gabapentin  100 mg Oral Once per day on Monday Wednesday Friday Milan Ruiz MD      heparin (porcine)  5,000 Units Subcutaneous Q12H [facility] Milan Ruiz MD      hydrALAZINE  5 mg Intravenous Q6H PRN Allegra Georges MD      HYDROmorphone  0.2 mg Intravenous Q4H PRN GRANT Rich      insulin lispro  1-5 Units Subcutaneous TID AC Lore Revdelar, DO      ipratropium-albuterol  3 mL Nebulization Q4H PRN Milan Ruiz MD      lidocaine  1 patch Topical Daily Lore Revdelar, DO      melatonin  3 mg Oral HS PRN GRANT Church      metoprolol succinate  25 mg Oral Q12H [facility] Lynne Ayon, GRANT      NIFEdipine  30 mg Oral Daily Bradley Beaulieu MD      oxyCODONE  2.5 mg Oral Q8H PRN GRATN Rich      senna  8.6 mg Oral Daily With Lunch Lore Revdelar, DO      sevelamer  1,600 mg Oral TID With Meals Lore Revdelar, DO                ALLEGRA GEORGES MD   Hospitalist - [facility]        ** Please Note:  Documentation is constructed using a voice recognition dictation system.  An occasional wrong word/phrase or “sound-a-like” substitution may have been picked up by dictation device due to the inherent limitations of voice recognition software.  Read the chart  carefully and recognize, using reasonable context, where substitutions may have occurred.**

## 2025-06-12 NOTE — ASSESSMENT & PLAN NOTE
6/4-6/6 - Patient presents from rehab facility due to shortness of breath.  He was noted to be hypoxic to 84% on room air and in respiratory distress by medics  Initially required BiPAP in the ER and currently on 4 L  VBG showed pH of 7.3 with PCO2 of 52 and PO2 of 30  Likely in the setting of volume overload due to noncompliance with dialysis sessions  Chest x-ray appears to be pulmonary edema however official read is pending  Further management with dialysis.  Titrate off oxygen as tolerated  COVID pending    6/7 - Secondary to volume overload from missed hemodialysis.  Continue hemodialysis per recommendations from nephrology.  Patient also found to be positive for COVID-19 which may also be contributing. Overall, respiratory status is improved.    6/8 - Improved. Hemodialysis per nephrology. Plan to complete remdesivir today. Will also discontinue dexamethasone as respiratory status is stabilized and patient is saturating well on room air.    6/9 - Respiratory status remains stable. Continue hemodialysis per recommendations from nephrology.  Patient continues to saturate well on room air.  Status post a course of dexamethasone and remdesivir for COVID.    6/10 - Remains oxygenating on baseline room air.  Off treatment for COVID at this time.  Continue to encourage compliance with outpatient dialysis sessions.  With stability, anticipate discharge in the next 1-2 days.     6/11-6/12 - Denies further shortness of breath at this time, and denies acute respiratory complaints.  Cleared for discharge.  Patient/family initially appealed discharge which has been upheld by insurance -> case management to arrange transport tomorrow.

## 2025-06-12 NOTE — ASSESSMENT & PLAN NOTE
10/9/24 Echo EF 60-65%, G1DD  Volume status has improved with UF with HD, decreased target weight.  Continue UF with HD, may need to decrease target weight further if tolerated

## 2025-06-12 NOTE — PLAN OF CARE
Problem: METABOLIC, FLUID AND ELECTROLYTES - ADULT  Goal: Electrolytes maintained within normal limits  Description: INTERVENTIONS:  - Monitor labs and assess patient for signs and symptoms of electrolyte imbalances  - Administer electrolyte replacement as ordered  - Monitor response to electrolyte replacements, including repeat lab results as appropriate  - Instruct patient on fluid and nutrition as appropriate  6/12/2025 1012 by Marcelino Schwartz  Outcome: Progressing  6/12/2025 1012 by Marcelino Schwartz  Outcome: Progressing  Goal: Fluid balance maintained  Description: INTERVENTIONS:  - Monitor labs   - Monitor I/O and WT  - Instruct patient on fluid and nutrition as appropriate  - Assess for signs & symptoms of volume excess or deficit  6/12/2025 1012 by Marcelino Schwartz  Outcome: Progressing  6/12/2025 1012 by Marcelino Schwartz  Outcome: Progressing     Problem: Prexisting or High Potential for Compromised Skin Integrity  Goal: Skin integrity is maintained or improved  Description: INTERVENTIONS:  - Identify patients at risk for skin breakdown  - Assess and monitor skin integrity including under and around medical devices   - Assess and monitor nutrition and hydration status  - Monitor labs  - Assess for incontinence   - Turn and reposition patient  - Assist with mobility/ambulation  - Relieve pressure over chente prominences   - Avoid friction and shearing  - Provide appropriate hygiene as needed including keeping skin clean and dry  - Evaluate need for skin moisturizer/barrier cream  - Collaborate with interdisciplinary team  - Patient/family teaching  - Consider wound care consult    Assess:  - Review Praful scale daily  - Clean and moisturize skin     Bed Management:  - Have minimal linens on bed & keep smooth, unwrinkled  - Change linens as needed     Activity:    - Encourage activity and walks on unit  - Encourage or provide ROM exercises       Skin Care:  - Avoid use of baby powder, tape, friction and shearing, hot water or  constrictive clothing  - Relieve pressure over bony prominences   - Do not massage red bony areas    Next Steps:  - Teach patient strategies to minimize risks    Consider consults to  interdisciplinary teams   6/12/2025 1012 by Marcelino Schwartz  Outcome: Progressing  6/12/2025 1012 by Marcelino Schwartz  Outcome: Progressing     Problem: RESPIRATORY - ADULT  Goal: Achieves optimal ventilation and oxygenation  Description: INTERVENTIONS:  - Assess for changes in respiratory status  - Assess for changes in mentation and behavior  - Position to facilitate oxygenation and minimize respiratory effort  - Oxygen administered by appropriate delivery if ordered  - Initiate smoking cessation education as indicated  - Encourage broncho-pulmonary hygiene including cough, deep breathe, Incentive Spirometry  - Assess the need for suctioning and aspirate as needed  - Assess and instruct to report SOB or any respiratory difficulty  - Respiratory Therapy support as indicated  6/12/2025 1012 by Marcelino Schwartz  Outcome: Progressing  6/12/2025 1012 by Marcelino Schwartz  Outcome: Progressing     Problem: PAIN - ADULT  Goal: Verbalizes/displays adequate comfort level or baseline comfort level  Description: Interventions:  - Encourage patient to monitor pain and request assistance  - Assess pain using appropriate pain scale  - Administer analgesics as ordered based on type and severity of pain and evaluate response  - Implement non-pharmacological measures as appropriate and evaluate response  - Consider cultural and social influences on pain and pain management  - Notify physician/advanced practitioner if interventions unsuccessful or patient reports new pain  - Educate patient/family on pain management process including their role and importance of  reporting pain   - Provide non-pharmacologic/complimentary pain relief interventions  6/12/2025 1012 by Marcelino Schwartz  Outcome: Progressing  6/12/2025 1012 by Marcelino Schwartz  Outcome: Progressing     Problem:  INFECTION - ADULT  Goal: Absence or prevention of progression during hospitalization  Description: INTERVENTIONS:  - Assess and monitor for signs and symptoms of infection  - Monitor lab/diagnostic results  - Monitor all insertion sites, i.e. indwelling lines, tubes, and drains  - Monitor endotracheal if appropriate and nasal secretions for changes in amount and color  - [place] appropriate cooling/warming therapies per order  - Administer medications as ordered  - Instruct and encourage patient and family to use good hand hygiene technique  - Identify and instruct in appropriate isolation precautions for identified infection/condition  6/12/2025 1012 by Marcelino Schwartz  Outcome: Progressing  6/12/2025 1012 by Marcelino Schwartz  Outcome: Progressing     Problem: SAFETY ADULT  Goal: Patient will remain free of falls  Description: INTERVENTIONS:  - Educate patient/family on patient safety including physical limitations  - Instruct patient to call for assistance with activity   - Consider consulting OT/PT to assist with strengthening/mobility based on AM PAC & -HLM score  - Consult OT/PT to assist with strengthening/mobility   - Keep Call bell within reach  - Keep bed low and locked with side rails adjusted as appropriate  - Keep care items and personal belongings within reach  - Initiate and maintain comfort rounds    - Apply yellow socks and bracelet for high fall risk patients  - Consider moving patient to room near nurses station  6/12/2025 1012 by Marcelino Schwartz  Outcome: Progressing  6/12/2025 1012 by Marcelino Schwartz  Outcome: Progressing  Goal: Maintain or return to baseline ADL function  Description: INTERVENTIONS:  -  Assess patient's ability to carry out ADLs; assess patient's baseline for ADL function and identify physical deficits which impact ability to perform ADLs (bathing, care of mouth/teeth, toileting, grooming, dressing, etc.)  - Assess/evaluate cause of self-care deficits   - Assess range of motion  - Assess  patient's mobility; develop plan if impaired  - Assess patient's need for assistive devices and provide as appropriate  - Encourage maximum independence but intervene and supervise when necessary  - Involve family in performance of ADLs  - Assess for home care needs following discharge   - Consider OT consult to assist with ADL evaluation and planning for discharge  - Provide patient education as appropriate  - Monitor functional capacity and physical performance, use of AM PAC & JH-HLM   - Monitor gait, balance and fatigue with ambulation    6/12/2025 1012 by Marcelino Schwartz  Outcome: Progressing  6/12/2025 1012 by Marcelino Schwartz  Outcome: Progressing  Goal: Maintains/Returns to pre admission functional level  Description: INTERVENTIONS:  - Perform AM-PAC 6 Click Basic Mobility/ Daily Activity assessment daily.  - Set and communicate daily mobility goal to care team and patient/family/caregiver.   - Collaborate with rehabilitation services on mobility goals   - Out of bed for toileting  - Record patient progress and toleration of activity level   6/12/2025 1012 by Marcelino Schwartz  Outcome: Progressing  6/12/2025 1012 by Marcelino Schwartz  Outcome: Progressing     Problem: DISCHARGE PLANNING  Goal: Discharge to home or other facility with appropriate resources  Description: INTERVENTIONS:  - Identify barriers to discharge w/patient and caregiver  - Arrange for needed discharge resources and transportation as appropriate  - Identify discharge learning needs (meds, wound care, etc.)  - Arrange for interpretive services to assist at discharge as needed  - Refer to Case Management Department for coordinating discharge planning if the patient needs post-hospital services based on physician/advanced practitioner order or complex needs related to functional status, cognitive ability, or social support system  6/12/2025 1012 by Marcelino Schwartz  Outcome: Progressing  6/12/2025 1012 by Marcelino Schwartz  Outcome: Progressing     Problem: Knowledge  Deficit  Goal: Patient/family/caregiver demonstrates understanding of disease process, treatment plan, medications, and discharge instructions  Description: Complete learning assessment and assess knowledge base.  Interventions:  - Provide teaching at level of understanding  - Provide teaching via preferred learning methods  6/12/2025 1012 by Marcelino Schwartz  Outcome: Progressing  6/12/2025 1012 by Marcelino Schwartz  Outcome: Progressing     Problem: Nutrition/Hydration-ADULT  Goal: Nutrient/Hydration intake appropriate for improving, restoring or maintaining nutritional needs  Description: Monitor and assess patient's nutrition/hydration status for malnutrition. Collaborate with interdisciplinary team and initiate plan and interventions as ordered.  Monitor patient's weight and dietary intake as ordered or per policy. Utilize nutrition screening tool and intervene as necessary. Determine patient's food preferences and provide high-protein, high-caloric foods as appropriate.     INTERVENTIONS:  - Monitor oral intake, urinary output, labs, and treatment plans  - Assess nutrition and hydration status and recommend course of action  - Evaluate amount of meals eaten  - Assist patient with eating if necessary   - Allow adequate time for meals  - Recommend/ encourage appropriate diets, oral nutritional supplements, and vitamin/mineral supplements  - Order, calculate, and assess calorie counts as needed  - Recommend, monitor, and adjust tube feedings and TPN/PPN based on assessed needs  - Assess need for intravenous fluids  - Provide specific nutrition/hydration education as appropriate  - Include patient/family/caregiver in decisions related to nutrition  6/12/2025 1012 by Marcelino Schwartz  Outcome: Progressing  6/12/2025 1012 by Marcelino Schwartz  Outcome: Progressing

## 2025-06-13 VITALS
SYSTOLIC BLOOD PRESSURE: 124 MMHG | WEIGHT: 197.75 LBS | HEIGHT: 73 IN | DIASTOLIC BLOOD PRESSURE: 49 MMHG | RESPIRATION RATE: 17 BRPM | HEART RATE: 59 BPM | BODY MASS INDEX: 26.21 KG/M2 | TEMPERATURE: 97.2 F | OXYGEN SATURATION: 98 %

## 2025-06-13 LAB
ANION GAP SERPL CALCULATED.3IONS-SCNC: 7 MMOL/L (ref 4–13)
BACTERIA UR CULT: ABNORMAL
BASOPHILS # BLD AUTO: 0.02 THOUSANDS/ÂΜL (ref 0–0.1)
BASOPHILS NFR BLD AUTO: 0 % (ref 0–1)
BUN SERPL-MCNC: 34 MG/DL (ref 5–25)
CALCIUM SERPL-MCNC: 8.6 MG/DL (ref 8.4–10.2)
CHLORIDE SERPL-SCNC: 95 MMOL/L (ref 96–108)
CO2 SERPL-SCNC: 31 MMOL/L (ref 21–32)
CREAT SERPL-MCNC: 4.29 MG/DL (ref 0.6–1.3)
EOSINOPHIL # BLD AUTO: 0.4 THOUSAND/ÂΜL (ref 0–0.61)
EOSINOPHIL NFR BLD AUTO: 5 % (ref 0–6)
ERYTHROCYTE [DISTWIDTH] IN BLOOD BY AUTOMATED COUNT: 14.6 % (ref 11.6–15.1)
GFR SERPL CREATININE-BSD FRML MDRD: 13 ML/MIN/1.73SQ M
GLUCOSE SERPL-MCNC: 154 MG/DL (ref 65–140)
GLUCOSE SERPL-MCNC: 173 MG/DL (ref 65–140)
HCT VFR BLD AUTO: 31.2 % (ref 36.5–49.3)
HGB BLD-MCNC: 9.7 G/DL (ref 12–17)
IMM GRANULOCYTES # BLD AUTO: 0.05 THOUSAND/UL (ref 0–0.2)
IMM GRANULOCYTES NFR BLD AUTO: 1 % (ref 0–2)
LYMPHOCYTES # BLD AUTO: 1.03 THOUSANDS/ÂΜL (ref 0.6–4.47)
LYMPHOCYTES NFR BLD AUTO: 13 % (ref 14–44)
MCH RBC QN AUTO: 29.8 PG (ref 26.8–34.3)
MCHC RBC AUTO-ENTMCNC: 31.1 G/DL (ref 31.4–37.4)
MCV RBC AUTO: 96 FL (ref 82–98)
MONOCYTES # BLD AUTO: 0.99 THOUSAND/ÂΜL (ref 0.17–1.22)
MONOCYTES NFR BLD AUTO: 12 % (ref 4–12)
NEUTROPHILS # BLD AUTO: 5.57 THOUSANDS/ÂΜL (ref 1.85–7.62)
NEUTS SEG NFR BLD AUTO: 69 % (ref 43–75)
NRBC BLD AUTO-RTO: 0 /100 WBCS
PLATELET # BLD AUTO: 202 THOUSANDS/UL (ref 149–390)
PMV BLD AUTO: 10.2 FL (ref 8.9–12.7)
POTASSIUM SERPL-SCNC: 4.1 MMOL/L (ref 3.5–5.3)
RBC # BLD AUTO: 3.26 MILLION/UL (ref 3.88–5.62)
SODIUM SERPL-SCNC: 133 MMOL/L (ref 135–147)
WBC # BLD AUTO: 8.06 THOUSAND/UL (ref 4.31–10.16)

## 2025-06-13 PROCEDURE — 85025 COMPLETE CBC W/AUTO DIFF WBC: CPT | Performed by: INTERNAL MEDICINE

## 2025-06-13 PROCEDURE — 99232 SBSQ HOSP IP/OBS MODERATE 35: CPT | Performed by: INTERNAL MEDICINE

## 2025-06-13 PROCEDURE — 80048 BASIC METABOLIC PNL TOTAL CA: CPT | Performed by: INTERNAL MEDICINE

## 2025-06-13 PROCEDURE — 82948 REAGENT STRIP/BLOOD GLUCOSE: CPT

## 2025-06-13 RX ADMIN — GABAPENTIN 100 MG: 100 CAPSULE ORAL at 08:16

## 2025-06-13 RX ADMIN — METOPROLOL SUCCINATE 25 MG: 25 TABLET, EXTENDED RELEASE ORAL at 08:16

## 2025-06-13 RX ADMIN — SEVELAMER HYDROCHLORIDE 1600 MG: 800 TABLET, FILM COATED ORAL at 08:16

## 2025-06-13 RX ADMIN — Medication 3 MG: at 00:11

## 2025-06-13 RX ADMIN — INSULIN LISPRO 1 UNITS: 100 INJECTION, SOLUTION INTRAVENOUS; SUBCUTANEOUS at 08:16

## 2025-06-13 RX ADMIN — NIFEDIPINE 30 MG: 30 TABLET, EXTENDED RELEASE ORAL at 08:16

## 2025-06-13 RX ADMIN — HYDROMORPHONE HYDROCHLORIDE 0.2 MG: 0.2 INJECTION, SOLUTION INTRAMUSCULAR; INTRAVENOUS; SUBCUTANEOUS at 08:27

## 2025-06-13 RX ADMIN — Medication 2.5 MG: at 06:41

## 2025-06-13 RX ADMIN — ASPIRIN 81 MG 81 MG: 81 TABLET ORAL at 08:16

## 2025-06-13 RX ADMIN — HEPARIN SODIUM 5000 UNITS: 5000 INJECTION INTRAVENOUS; SUBCUTANEOUS at 08:16

## 2025-06-13 NOTE — ASSESSMENT & PLAN NOTE
[facility] TTS  Access: R IJ permcath  EDW 94.7 kg.  Last postdialysis weight was 89.7 kg.  Decrease target weight to 89.5 kg and will continue to challenge  Had urgent HD treatment on Wednesday, 6/4/25, due to hyperkalemia and fluid overload.     Status post intermittent HD on 6/10 with postdialysis weight 88.3 kg.  1000 mL of fluid was removed.   Status post intermittent HD on 6/12, postdialysis weight was 87.7 kg.  Will need to decrease outpatient target weight to prevent fluid overload.  Okay for discharge from nephrology side today.  Next dialysis will be on Saturday per schedule.  As per my discussion with the nursing staff noted plan for discharge today

## 2025-06-13 NOTE — PLAN OF CARE
Problem: METABOLIC, FLUID AND ELECTROLYTES - ADULT  Goal: Electrolytes maintained within normal limits  Description: INTERVENTIONS:  - Monitor labs and assess patient for signs and symptoms of electrolyte imbalances  - Administer electrolyte replacement as ordered  - Monitor response to electrolyte replacements, including repeat lab results as appropriate  - Instruct patient on fluid and nutrition as appropriate  Outcome: Progressing     Problem: METABOLIC, FLUID AND ELECTROLYTES - ADULT  Goal: Fluid balance maintained  Description: INTERVENTIONS:  - Monitor labs   - Monitor I/O and WT  - Instruct patient on fluid and nutrition as appropriate  - Assess for signs & symptoms of volume excess or deficit  Outcome: Progressing     Problem: RESPIRATORY - ADULT  Goal: Achieves optimal ventilation and oxygenation  Description: INTERVENTIONS:  - Assess for changes in respiratory status  - Assess for changes in mentation and behavior  - Position to facilitate oxygenation and minimize respiratory effort  - Oxygen administered by appropriate delivery if ordered  - Initiate smoking cessation education as indicated  - Encourage broncho-pulmonary hygiene including cough, deep breathe, Incentive Spirometry  - Assess the need for suctioning and aspirate as needed  - Assess and instruct to report SOB or any respiratory difficulty  - Respiratory Therapy support as indicated  Outcome: Progressing     Problem: PAIN - ADULT  Goal: Verbalizes/displays adequate comfort level or baseline comfort level  Description: Interventions:  - Encourage patient to monitor pain and request assistance  - Assess pain using appropriate pain scale  - Administer analgesics as ordered based on type and severity of pain and evaluate response  - Implement non-pharmacological measures as appropriate and evaluate response  - Consider cultural and social influences on pain and pain management  - Notify physician/advanced practitioner if interventions  unsuccessful or patient reports new pain  - Educate patient/family on pain management process including their role and importance of  reporting pain   - Provide non-pharmacologic/complimentary pain relief interventions  Outcome: Progressing     Problem: INFECTION - ADULT  Goal: Absence or prevention of progression during hospitalization  Description: INTERVENTIONS:  - Assess and monitor for signs and symptoms of infection  - Monitor lab/diagnostic results  - Monitor all insertion sites, i.e. indwelling lines, tubes, and drains  - Monitor endotracheal if appropriate and nasal secretions for changes in amount and color  - [place] appropriate cooling/warming therapies per order  - Administer medications as ordered  - Instruct and encourage patient and family to use good hand hygiene technique  - Identify and instruct in appropriate isolation precautions for identified infection/condition  Outcome: Progressing     Problem: SAFETY ADULT  Goal: Patient will remain free of falls  Description: INTERVENTIONS:  - Educate patient/family on patient safety including physical limitations  - Instruct patient to call for assistance with activity   - Consider consulting OT/PT to assist with strengthening/mobility based on AM PAC & JH-HLM score  - Consult OT/PT to assist with strengthening/mobility   - Keep Call bell within reach  - Keep bed low and locked with side rails adjusted as appropriate  - Keep care items and personal belongings within reach  - Initiate and maintain comfort rounds  - Make Fall Risk Sign visible to staff  - Offer Toileting every 2 Hours, in advance of need  - Initiate/Maintain bed alarm  - Obtain necessary fall risk management equipment:   - Apply yellow socks and bracelet for high fall risk patients  - Consider moving patient to room near nurses station  Outcome: Progressing     Problem: Knowledge Deficit  Goal: Patient/family/caregiver demonstrates understanding of disease process, treatment plan,  medications, and discharge instructions  Description: Complete learning assessment and assess knowledge base.  Interventions:  - Provide teaching at level of understanding  - Provide teaching via preferred learning methods  Outcome: Progressing     Problem: Nutrition/Hydration-ADULT  Goal: Nutrient/Hydration intake appropriate for improving, restoring or maintaining nutritional needs  Description: Monitor and assess patient's nutrition/hydration status for malnutrition. Collaborate with interdisciplinary team and initiate plan and interventions as ordered.  Monitor patient's weight and dietary intake as ordered or per policy. Utilize nutrition screening tool and intervene as necessary. Determine patient's food preferences and provide high-protein, high-caloric foods as appropriate.     INTERVENTIONS:  - Monitor oral intake, urinary output, labs, and treatment plans  - Assess nutrition and hydration status and recommend course of action  - Evaluate amount of meals eaten  - Assist patient with eating if necessary   - Allow adequate time for meals  - Recommend/ encourage appropriate diets, oral nutritional supplements, and vitamin/mineral supplements  - Order, calculate, and assess calorie counts as needed  - Recommend, monitor, and adjust tube feedings and TPN/PPN based on assessed needs  - Assess need for intravenous fluids  - Provide specific nutrition/hydration education as appropriate  - Include patient/family/caregiver in decisions related to nutrition  Outcome: Progressing

## 2025-06-13 NOTE — PROGRESS NOTES
Progress Note - Nephrology   Name: Naresh Carpio 64 y.o. male I MRN: [id number]  Unit/Bed#: [address] [place] [address] I Date of Admission: 6/4/2025   Date of Service: 6/13/2025 I Hospital Day: 8    Assessment & Plan  ESRD (end stage renal disease) (HCC)  [facility] TTS  Access: R IJ permcath  EDW 94.7 kg.  Last postdialysis weight was 89.7 kg.  Decrease target weight to 89.5 kg and will continue to challenge  Had urgent HD treatment on Wednesday, 6/4/25, due to hyperkalemia and fluid overload.     Status post intermittent HD on 6/10 with postdialysis weight 88.3 kg.  1000 mL of fluid was removed.   Status post intermittent HD on 6/12, postdialysis weight was 87.7 kg.  Will need to decrease outpatient target weight to prevent fluid overload.  Okay for discharge from nephrology side today.  Next dialysis will be on Saturday per schedule.  As per my discussion with the nursing staff noted plan for discharge today  Acute respiratory failure with hypoxia ([facility])  Felt to be due to fluid overload + infectious etiology.  Chest x-ray was suggestive of pulmonary edema. COVID positive on admission.   Of note, troponin levels elevated and cards recommended cardiac cath which patient is refusing.   Continue management of COVID per primary team    Essential hypertension  BP stable and is at goal  Home Rx: Nifedipine 30 mg daily, furosemide 20 mg twice a day.  Current Rx: Metoprolol succ 25 mg BID.  Nifedipine 30 mg daily which was started on 6/9.  Metoprolol added by cards due to NSTEMI.   BP stable continue current medications    Volume overload secondary to noncompliance with hemodialysis  10/9/24 Echo EF 60-65%, G1DD  Volume status has improved with UF with HD, decreased target weight.  Continue UF with HD, may need to decrease target weight further if tolerated  Bilateral leg edema  This is chronic and stable and improving with more UF with HD.  Continue UF with HD, edema has improved with decreasing target weight.  Continue UF  with HD    Anemia in ESRD (end-stage renal disease)  ([facility])  Hgb is below goal at 9.7 K g/dL, stable  He is on Mircera 150 mcg every 2 weeks and Venofer.  Continue to monitor hemoglobin. no need for SURINDER for now    Chronic kidney disease-mineral and bone disorder  He is not on calcitriol in the outpatient setting.  Home Rx: Sevelamer.   Continue Sevelamer 1600 mg TID and low phos diet. Phos at goal.  Last phosphorus was 3.5    Elevated troponin  Cardiology consulted.  Patient is refusing cardiac catheterization.      COVID-19  On remdesivir and dexamethasone per primary service.  Hyponatremia  -Due to impaired free water excretion, sodium was 133 on last blood work, continue UF with HD, continue fluid restriction  Metabolic encephalopathy  Mental status has been fluctuating, patient is more awake alert, complaining of back pain.    I have reviewed the nephrology recommendations including next dialysis treatment tomorrow and okay for discharge today from nephrology side, with primary team, and we are in agreement with renal plan including the information outlined above.     Subjective   No new complaints.  No chest pain or shortness of breath, no nausea vomiting    Objective :  Temp:  [97.2 °F (36.2 °C)-97.9 °F (36.6 °C)] 97.2 °F (36.2 °C)  HR:  [54-62] 59  BP: (111-154)/(48-60) 124/49  Resp:  [17-19] 17    Current Weight: Weight - Scale: 89.7 kg (197 lb 12 oz)  First Weight: Weight - Scale: 90.8 kg (200 lb 2.8 oz)  I/O         06/11 0701  06/12 0700 06/12 0701  06/13 0700 06/13 0701  06/14 0700    P.O. 500 850     I.V. (mL/kg) 10 (0.1) 520 (5.8)     Other  200     Total Intake(mL/kg) 510 (5.7) 1570 (17.5)     Urine (mL/kg/hr) 450 (0.2) 450 (0.2)     Other  2600     Total Output 450 3050     Net +60 -1480                  Physical Exam   General:  Ill looking, awake.  Eyes: Conjunctivae pink,  Sclera anicteric  ENT: lips and mucous membranes moist  Neck: supple   Chest: Clear to Auscultation both lungs,  no crackles,  "ronchus or wheezing.  CVS: S1 & S2 present, normal rate, regular rhythm, no murmur.  Abdomen: soft, non-tender, non-distended, Bowel sounds normoactive  Extremities: 1+ edema both legs, overall improving  Skin: no rash  Neuro: awake, alert, oriented x 3   Psych: Mood and affect appropriate    Medications:  Current Medications[1]      Lab Results: I have reviewed the following results:  Results from last 7 days   Lab Units 06/13/25  0508 06/12/25  0555 06/11/25  0551 06/10/25  0939 06/09/25  0438 06/09/25  0032 06/08/25  0519 06/07/25  0452   WBC Thousand/uL 8.06 8.06 7.54 8.57 9.86 7.26 6.13 4.18*   HEMOGLOBIN g/dL 9.7* 9.4* 9.3* 8.7* 9.9* 9.2* 9.0* 8.9*   HEMATOCRIT % 31.2* 30.6* 29.2* 27.2* 31.3* 28.9* 28.8* 28.9*   PLATELETS Thousands/uL 202 223 178 181 192 192 180 169   POTASSIUM mmol/L 4.1 4.3 4.2 4.5 4.3 4.5 4.3 4.3   CHLORIDE mmol/L 95* 95* 94* 93* 92* 90* 91* 93*   CO2 mmol/L 31 22 24 22 25 23 24 23   BUN mg/dL 34* 49* 35* 67* 53* 51* 38* 51*   CREATININE mg/dL 4.29* 5.99* 4.55* 6.71* 5.15* 4.84* 3.93* 5.22*   CALCIUM mg/dL 8.6 8.1* 7.9* 7.9* 8.7 8.7 8.5 8.5   MAGNESIUM mg/dL  --   --   --  2.0 1.9 1.9 1.9 2.1   PHOSPHORUS mg/dL  --   --   --   --   --  3.5  --   --    ALBUMIN g/dL  --   --   --   --   --  3.8  --   --        Administrative Statements     Portions of the record may have been created with voice recognition software. Occasional wrong word or \"sound a like\" substitutions may have occurred due to the inherent limitations of voice recognition software. Read the chart carefully and recognize, using context, where substitutions have occurred.If you have any questions, please contact the dictating provider.       [1]   Current Facility-Administered Medications:     acetaminophen (TYLENOL) tablet 650 mg, 650 mg, Oral, Q6H PRN, Lore Nascimentoankar, DO, 650 mg at 06/08/25 2055    allopurinol (ZYLOPRIM) tablet 100 mg, 100 mg, Oral, Once per day on Tuesday Thursday Saturday, Lore Amadorar, DO, 100 mg at " 06/12/25 1442    aspirin chewable tablet 81 mg, 81 mg, Oral, Daily, Lore Silver DO, 81 mg at 06/13/25 0816    atorvastatin (LIPITOR) tablet 80 mg, 80 mg, Oral, Daily With Dinner, Lore Silver DO, 80 mg at 06/12/25 1643    calcium carbonate (TUMS) chewable tablet 500 mg, 500 mg, Oral, BID PRN, GRANT Rich, 500 mg at 06/06/25 0113    famotidine (PEPCID) tablet 20 mg, 20 mg, Oral, HS, GRANT Rich, 20 mg at 06/12/25 2140    gabapentin (NEURONTIN) capsule 100 mg, 100 mg, Oral, Once per day on Monday Wednesday Friday, Milan Ruiz MD, 100 mg at 06/13/25 0816    heparin (porcine) subcutaneous injection 5,000 Units, 5,000 Units, Subcutaneous, Q12H KEMAL, Milan Ruiz MD, 5,000 Units at 06/13/25 0816    hydrALAZINE (APRESOLINE) injection 5 mg, 5 mg, Intravenous, Q6H PRN, Allegra Carrero MD    HYDROmorphone HCl (DILAUDID) injection 0.2 mg, 0.2 mg, Intravenous, Q4H PRN, GRANT Rich, 0.2 mg at 06/13/25 0827    insulin lispro (HumALOG/ADMELOG) 100 units/mL subcutaneous injection 1-5 Units, 1-5 Units, Subcutaneous, TID AC, 1 Units at 06/13/25 0816 **AND** Fingerstick Glucose (POCT), , , TID AC, Lore Amadorar, DO    ipratropium-albuterol (DUO-NEB) 0.5-2.5 mg/3 mL inhalation solution 3 mL, 3 mL, Nebulization, Q4H PRN, Milan Ruiz MD    lidocaine (LIDODERM) 5 % patch 1 patch, 1 patch, Topical, Daily, Lore Silver, , 1 patch at 06/12/25 0820    melatonin tablet 3 mg, 3 mg, Oral, HS PRN, GRANT Church, 3 mg at 06/13/25 0011    metoprolol succinate (TOPROL-XL) 24 hr tablet 25 mg, 25 mg, Oral, Q12H KEMAL, GRANT Shell, 25 mg at 06/13/25 0816    NIFEdipine (PROCARDIA XL) 24 hr tablet 30 mg, 30 mg, Oral, Daily, Bradley Beaulieu MD, 30 mg at 06/13/25 0816    oxyCODONE (ROXICODONE) split tablet 2.5 mg, 2.5 mg, Oral, Q8H PRN, GRANT Rich, 2.5 mg at 06/13/25 0641    senna (SENOKOT) tablet 8.6 mg, 8.6 mg, Oral, Daily With Lunch, Lore Silver DO, 8.6 mg at  06/12/25 1143    sevelamer (RENAGEL) tablet 1,600 mg, 1,600 mg, Oral, TID With Meals, Lore Silver DO, 1,600 mg at 06/13/25 0816

## 2025-06-13 NOTE — ASSESSMENT & PLAN NOTE
Hgb is below goal at 9.7 K g/dL, stable  He is on Mircera 150 mcg every 2 weeks and Venofer.  Continue to monitor hemoglobin. no need for SURINDER for now     Declines

## 2025-06-13 NOTE — NURSING NOTE
Patient left with transport team at 1015. Report attempted at 1100 by this RN, no answer from nursing unit, call back number left on transfer paperwork.

## 2025-06-13 NOTE — ASSESSMENT & PLAN NOTE
-Due to impaired free water excretion, sodium was 133 on last blood work, continue UF with HD, continue fluid restriction

## 2025-06-13 NOTE — [place] UNIVERSAL TRANSFER FORM
"[place] UNIVERSAL TRANSFER FORM  (ALL ITEMS MUST BE COMPLETED)    1. TRANSFER FROM: [facility]      TRANSFER TO: Susanne Boyle    2. DATE OF TRANSFER: 6/13/2025                        TIME OF TRANSFER: 1000    3. PATIENT NAME: Naresh Carpio D      YOB: 1960                             GENDER: male    4. LANGUAGE:   English    5. PHYSICIAN NAME:  Allegra Carrero MD                   PHONE: [phone]    6. CODE STATUS: Level 1 - Full Code        Out of Hospital DNR Attached: No    7. :                                      :  Extended Emergency Contact Information  Primary Emergency Contact: Alicja Carpio  Mobile Phone: [phone]  Relation: Sister  Secondary Emergency Contact: Mercedes Resendez  Mobile Phone: [phone]  Relation: Friend           Health Care Representative/Proxy:  Yes           Legal Guardian:  No             NAME OF: Mercedes Resendez           HEALTH CARE REPRESENTATIVE/PROXY:                                         OR           LEGAL GUARDIAN, IF NOT :                                               PHONE:  (Day)           (Night)                        (Cell) [phone]    8. REASON FOR TRANSFER: (Must include brief medical history and recent changes in physical function or cognition.) STR            V/S: BP (!) 124/49   Pulse 59   Temp (!) 97.2 °F (36.2 °C)   Resp 17   Ht 6' 1\" (1.854 m)   Wt 89.7 kg (197 lb 12 oz)   SpO2 98%   BMI 26.09 kg/m²           PAIN: Yes, Rating : 0-10 (9)    9. PRIMARY DIAGNOSIS: Acute respiratory failure with hypoxia (HCC)      Secondary Diagnosis:         Pacemaker: No      Internal Defib: No          Mental Health Diagnosis (if Applicable):    10. RESTRAINTS: No     11. RESPIRATORY NEEDS: None    12. ISOLATION/PRECAUTION: MRSA/COVID+    13. ALLERGY: Patient has no known allergies.    14. SENSORY:       Vision Poor and Hearing Good     15. SKIN CONDITION: " Yes:  Diabetic    16. DIET: Special (describe)Cardiac, Sodium 2 GM, FR 1800    17. IV ACCESS: None    18. PERSONAL ITEMS SENT WITH PATIENT: Other: LSO spine brace    19. ATTACHED DOCUMENTS: MUST ATTACH CURRENT MEDICATION INFORMATION Face Sheet, MAR, Medication Reconciliation, Diagnostic Studies, Labs, Code Status, Discharge Summary, PT Note, and OT Note    20. AT RISK ALERTS:Falls        HARM TO: N/A    21. WEIGHT BEARING STATUS:         Left Leg: None        Right Leg: None    22. MENTAL STATUS:Alert and Forgetful    23. FUNCTION:        Walk: With Help        Transfer: With Help        Toilet: With Help        Feed: Self    24. IMMUNIZATIONS/SCREENING:     Immunization History   Administered Date(s) Administered    COVID-19 J&J ([place]) vaccine 0.5 mL 05/07/2021    Pneumococcal Polysaccharide PPV23 07/14/2022    Tdap 10/08/2024       25. BOWEL: Date Last BM: 6/10/2025    26. BLADDER: Comments: Diminished urine production     27. SENDING FACILITY CONTACT: [facility]                  Title: [facility]        Unit: [address] [place] [address]        Phone: [phone]          REC'G FACILITY CONTACT (if known):        Title:        Unit:         Phone:         FORM PREFILLED BY (if applicable)       Title:       Unit:        Phone:         FORM COMPLETED BY Niharika Farley RN      Title: Charge Nurse      Phone: [phone]

## 2025-06-13 NOTE — CASE MANAGEMENT
Case Management Progress Note    Patient name Naresh Carpio  Location [address] [place] [address]-* MRN [id number]  : 1960 Date 2025       LOS (days): 8  Geometric Mean LOS (GMLOS) (days): 3.6  Days to GMLOS:-4.3        OBJECTIVE:        Current admission status: Inpatient  Pref[facility] Pharmacy:   [id number] - [place], [place] - [address] [facility] [address]  [address] [address]  Phone: [phone] Fax: [phone]    [id number] - [place], [place] - [address] [facility] [address]  [address] [address]  Phone: [phone] Fax: [phone]    Primary Care Provider: Jeffrey Jackson    Primary Insurance: MEDICARE  Secondary Insurance:     PROGRESS NOTE:    HINN reviewed with patient at bedside.  Patient verbalized acknowledgement but refused to sign.  Noted on form and copy given to patient.  Original placed in scan bin for chart.

## 2025-06-25 ENCOUNTER — TELEPHONE (OUTPATIENT)
Age: 65
End: 2025-06-25

## 2025-06-25 NOTE — TELEPHONE ENCOUNTER
Spoke to patient's sister to reschedule injection appointment from Wednesday to Thursday. Patient's sister notified office that patient has been losing vision since last appointment and it has been worsening recently. Office made urgent appointment for Thursday, patient's sister declined morning appointment for Wednesday for urgency.

## 2025-06-25 NOTE — TELEPHONE ENCOUNTER
Received phone call from patient's sister and office reiterated the importance of having an appointment ASAP. Patient's sister declined appointment on Thursday and Friday, and requested following week. Scheduled patient per patient's sister's request.

## 2025-06-25 NOTE — TELEPHONE ENCOUNTER
"Spoke to patient's sister again to request that patient come in earlier for appointment than Thursday at 11:30. Patient's sister declined and declined appointment entirely, canceling it because \"he has dialysis\". Patient's sister refused to reschedule and will call back \"to schedule possibly on a Monday or Wednesday\". Relayed to patient's sister urgency of appointment, patient's sister declined making appointment at this time.  "

## 2025-07-02 ENCOUNTER — PROCEDURE VISIT (OUTPATIENT)
Age: 65
End: 2025-07-02
Payer: MEDICARE

## 2025-07-02 DIAGNOSIS — H43.13 VITREOUS HEMORRHAGE OF BOTH EYES (HCC): ICD-10-CM

## 2025-07-02 DIAGNOSIS — H25.13 NUCLEAR SCLEROSIS OF BOTH EYES: ICD-10-CM

## 2025-07-02 DIAGNOSIS — E11.3513 PROLIFERATIVE DIABETIC RETINOPATHY OF BOTH EYES WITH MACULAR EDEMA ASSOCIATED WITH TYPE 2 DIABETES MELLITUS (HCC): Primary | ICD-10-CM

## 2025-07-02 DIAGNOSIS — H35.372 EPIRETINAL MEMBRANE, LEFT EYE: ICD-10-CM

## 2025-07-02 PROCEDURE — 99204 OFFICE O/P NEW MOD 45 MIN: CPT | Performed by: OPHTHALMOLOGY

## 2025-07-02 PROCEDURE — 67228 TREATMENT X10SV RETINOPATHY: CPT | Performed by: OPHTHALMOLOGY

## 2025-07-02 PROCEDURE — 92134 CPTRZ OPH DX IMG PST SGM RTA: CPT | Performed by: OPHTHALMOLOGY

## 2025-07-02 NOTE — PROGRESS NOTES
Name: Naresh Carpio      : 1960      MRN: [id number]  Encounter Provider: Chaya Zapata MD  Encounter Date: 2025   Encounter department: 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 He will likely need more extensive PRP;     The right eye has significant vitreous hemorrhage and cataract. Recommend CE/IOL and Vitrectomy but cannot do it here as we are an ASC setting. Will send to [facility] for further management.     Discussed visual prognosis is guarded due to the diabetes; Pt and family understand and agree with the plan.     Lab Results   Component Value Date    HGBA1C 4.8 2025       Orders:    OCT, Retina - OU - Both Eyes    Pan Retinal Photocoagulation - OS - Left Eye    Vitreous hemorrhage of both eyes (HCC)  Recommend possible vitrectomy;        Nuclear sclerosis of both eyes  Visually significant;        Epiretinal membrane, left eye  Visually significant; Will need vitrectomy;                Naresh Carpio is a 64 y.o. male who presents for overdue possible Avastin OS.     Reports he was having a hard time getting an appointment for the injection OS.    C/o decrease in VA OD>OS.    History obtained from: patient    Review of Systems  Medical History Reviewed by provider this encounter:     .     Past Ocular History:  PDR OU    Ocular Meds/Drops:   Avastin OD x1     Base Eye Exam       Visual Acuity (Snellen - Linear)         Right Left    Dist sc HM CF2              Tonometry (Tonopen, 1:55 PM)         Right Left    Pressure 16 14              Neuro/Psych       Oriented x3: Yes    Mood/Affect: Agitated               Dilation       Both eyes: 2.5% Phenylephrine @ 1:55 PM              Dilation #2       Both eyes: 1% Tropicamide @ 1:55 PM                  Slit Lamp and Fundus Exam       External Exam         Right Left    External Normal Normal              Slit Lamp Exam         Right Left    Lids/Lashes Normal Normal    Conjunctiva/Sclera White and quiet White and quiet    Cornea Clear Clear    Anterior Chamber Deep and quiet Deep and quiet    Iris Round and reactive Round and reactive    Lens 2+ NSC with 3+ cortical changes 2+ NSC with 3+ cortical changes    Anterior Vitreous 3+ vitreous hemorrhage 1+ vitreous hemorrhage; subhyaloid hemorrhage              Fundus Exam         Right Left    Disc  sharp, no pallor    C/D Ratio  0.25    Macula  microaneurysms; DME    Vessels  attenuated    Periphery  Dot blot heme; NVE                      IMAGING:  OCT, Retina - OU - Both Eyes          Left Eye  Findings include epiretinal membrane, intraretinal fluid, subretinal fluid.          Pan Retinal Photocoagulation - OS - Left Eye          Time Out  Confirmed correct patient, procedure, site, and patient consented.     Anesthesia  Topical anesthesia was used. Anesthetic medications included Proparacaine 0.5%, Tetracaine 0.5%.     Laser Information  The type of laser was argon. Color was green. The duration in seconds was 200. The spot size was 200 microns. Laser power was 250. Total spots was 289.     Post-op  The patient tolerated the procedure well. There were no complications. The patient received written and verbal post procedure care education.     Notes  Pt could not tolerate more than 289 spots.

## 2025-07-03 ENCOUNTER — TELEPHONE (OUTPATIENT)
Age: 65
End: 2025-07-03

## 2025-07-03 NOTE — TELEPHONE ENCOUNTER
Scheduled appointment at [facility] for Cataract Evaluation/ Possible Cataract surgery to be done in hospital setting    Appointment scheduled for July 30 12:45pm with Dr. Haney    Spoke with patients Sister and gave her all the information including date, time and location.    Faxed office notes to [phone] per office request.

## 2025-07-03 NOTE — ASSESSMENT & PLAN NOTE
Pt has significant vitreous hemorrhage in both eyes; He has ESRD and heart failure; Discussed vitrectomy but cannot be done at ASC;   He does have vitreous hemorrhage OU and on OCT there appears to be subretinal fluid; Recommend PRP OS today; Pt tolerated only a few spots. He will likely need more extensive PRP;     The right eye has significant vitreous hemorrhage and cataract. Recommend CE/IOL and Vitrectomy but cannot do it here as we are an ASC setting. Will send to [facility] for further management.     Discussed visual prognosis is guarded due to the diabetes; Pt and family understand and agree with the plan.     Lab Results   Component Value Date    HGBA1C 4.8 06/04/2025       Orders:    OCT, Retina - OU - Both Eyes    Pan Retinal Photocoagulation - OS - Left Eye

## 2025-07-08 ENCOUNTER — APPOINTMENT (OUTPATIENT)
Dept: DIALYSIS | Facility: HOSPITAL | Age: 65
DRG: 698 | End: 2025-07-08
Payer: MEDICARE

## 2025-07-08 ENCOUNTER — APPOINTMENT (EMERGENCY)
Dept: RADIOLOGY | Facility: HOSPITAL | Age: 65
DRG: 698 | End: 2025-07-08
Payer: MEDICARE

## 2025-07-08 ENCOUNTER — HOSPITAL ENCOUNTER (INPATIENT)
Facility: HOSPITAL | Age: 65
LOS: 3 days | Discharge: NURSING, REHAB OR LONG-TERM CARE | DRG: 698 | End: 2025-07-12
Attending: EMERGENCY MEDICINE
Payer: MEDICARE

## 2025-07-08 DIAGNOSIS — N18.6 ESRD (END STAGE RENAL DISEASE) ON DIALYSIS (HCC): Primary | ICD-10-CM

## 2025-07-08 DIAGNOSIS — R41.82 ALTERED MENTAL STATUS: ICD-10-CM

## 2025-07-08 DIAGNOSIS — T07.XXXA WOUNDS, MULTIPLE: ICD-10-CM

## 2025-07-08 DIAGNOSIS — Z99.2 DEPENDENCE ON RENAL DIALYSIS (HCC): ICD-10-CM

## 2025-07-08 DIAGNOSIS — Z99.2 ESRD (END STAGE RENAL DISEASE) ON DIALYSIS (HCC): Primary | ICD-10-CM

## 2025-07-08 DIAGNOSIS — E87.5 HYPERKALEMIA: ICD-10-CM

## 2025-07-08 PROBLEM — I50.9 CHF (CONGESTIVE HEART FAILURE) (HCC): Status: ACTIVE | Noted: 2025-07-08

## 2025-07-08 PROBLEM — D64.9 ANEMIA: Status: ACTIVE | Noted: 2025-07-08

## 2025-07-08 PROBLEM — I10 HTN (HYPERTENSION): Status: ACTIVE | Noted: 2025-07-08

## 2025-07-08 PROBLEM — R45.1 AGITATION: Status: ACTIVE | Noted: 2025-07-08

## 2025-07-08 LAB
ALBUMIN SERPL BCG-MCNC: 3.6 G/DL (ref 3.5–5)
ALP SERPL-CCNC: 78 U/L (ref 34–104)
ALT SERPL W P-5'-P-CCNC: 5 U/L (ref 7–52)
ANION GAP SERPL CALCULATED.3IONS-SCNC: 9 MMOL/L (ref 4–13)
APTT PPP: 32 SECONDS (ref 23–34)
AST SERPL W P-5'-P-CCNC: 8 U/L (ref 13–39)
BACTERIA UR QL AUTO: ABNORMAL /HPF
BASOPHILS # BLD AUTO: 0.05 THOUSANDS/ÂΜL (ref 0–0.1)
BASOPHILS NFR BLD AUTO: 1 % (ref 0–1)
BILIRUB SERPL-MCNC: 0.58 MG/DL (ref 0.2–1)
BILIRUB UR QL STRIP: NEGATIVE
BNP SERPL-MCNC: 3081 PG/ML (ref 0–100)
BUN SERPL-MCNC: 54 MG/DL (ref 5–25)
CALCIUM SERPL-MCNC: 9.1 MG/DL (ref 8.4–10.2)
CHLORIDE SERPL-SCNC: 94 MMOL/L (ref 96–108)
CLARITY UR: CLEAR
CO2 SERPL-SCNC: 28 MMOL/L (ref 21–32)
COLOR UR: COLORLESS
CREAT SERPL-MCNC: 8.89 MG/DL (ref 0.6–1.3)
EOSINOPHIL # BLD AUTO: 0.25 THOUSAND/ÂΜL (ref 0–0.61)
EOSINOPHIL NFR BLD AUTO: 4 % (ref 0–6)
ERYTHROCYTE [DISTWIDTH] IN BLOOD BY AUTOMATED COUNT: 15.7 % (ref 11.6–15.1)
GFR SERPL CREATININE-BSD FRML MDRD: 5 ML/MIN/1.73SQ M
GLUCOSE SERPL-MCNC: 86 MG/DL (ref 65–140)
GLUCOSE UR STRIP-MCNC: NEGATIVE MG/DL
HCT VFR BLD AUTO: 34.5 % (ref 36.5–49.3)
HGB BLD-MCNC: 10.3 G/DL (ref 12–17)
HGB UR QL STRIP.AUTO: ABNORMAL
IMM GRANULOCYTES # BLD AUTO: 0.02 THOUSAND/UL (ref 0–0.2)
IMM GRANULOCYTES NFR BLD AUTO: 0 % (ref 0–2)
INR PPP: 1.09 (ref 0.85–1.19)
KETONES UR STRIP-MCNC: NEGATIVE MG/DL
LEUKOCYTE ESTERASE UR QL STRIP: ABNORMAL
LYMPHOCYTES # BLD AUTO: 1.33 THOUSANDS/ÂΜL (ref 0.6–4.47)
LYMPHOCYTES NFR BLD AUTO: 23 % (ref 14–44)
MCH RBC QN AUTO: 28.5 PG (ref 26.8–34.3)
MCHC RBC AUTO-ENTMCNC: 29.9 G/DL (ref 31.4–37.4)
MCV RBC AUTO: 95 FL (ref 82–98)
MONOCYTES # BLD AUTO: 0.81 THOUSAND/ÂΜL (ref 0.17–1.22)
MONOCYTES NFR BLD AUTO: 14 % (ref 4–12)
NEUTROPHILS # BLD AUTO: 3.37 THOUSANDS/ÂΜL (ref 1.85–7.62)
NEUTS SEG NFR BLD AUTO: 58 % (ref 43–75)
NITRITE UR QL STRIP: NEGATIVE
NON-SQ EPI CELLS URNS QL MICRO: ABNORMAL /HPF
NRBC BLD AUTO-RTO: 0 /100 WBCS
PH UR STRIP.AUTO: 8 [PH]
PLATELET # BLD AUTO: 198 THOUSANDS/UL (ref 149–390)
PMV BLD AUTO: 9.6 FL (ref 8.9–12.7)
POTASSIUM SERPL-SCNC: 5.7 MMOL/L (ref 3.5–5.3)
PROCALCITONIN SERPL-MCNC: 0.25 NG/ML
PROT SERPL-MCNC: 7.3 G/DL (ref 6.4–8.4)
PROT UR STRIP-MCNC: ABNORMAL MG/DL
PROTHROMBIN TIME: 14.6 SECONDS (ref 12.3–15)
RBC # BLD AUTO: 3.62 MILLION/UL (ref 3.88–5.62)
RBC #/AREA URNS AUTO: ABNORMAL /HPF
SODIUM SERPL-SCNC: 131 MMOL/L (ref 135–147)
SP GR UR STRIP.AUTO: 1.01 (ref 1–1.03)
UROBILINOGEN UR STRIP-ACNC: <2 MG/DL
WBC # BLD AUTO: 5.83 THOUSAND/UL (ref 4.31–10.16)
WBC #/AREA URNS AUTO: ABNORMAL /HPF
WBC CLUMPS # UR AUTO: PRESENT /UL

## 2025-07-08 PROCEDURE — G0257 UNSCHED DIALYSIS ESRD PT HOS: HCPCS

## 2025-07-08 PROCEDURE — 99285 EMERGENCY DEPT VISIT HI MDM: CPT | Performed by: INTERNAL MEDICINE

## 2025-07-08 PROCEDURE — 85025 COMPLETE CBC W/AUTO DIFF WBC: CPT | Performed by: EMERGENCY MEDICINE

## 2025-07-08 PROCEDURE — 83880 ASSAY OF NATRIURETIC PEPTIDE: CPT | Performed by: EMERGENCY MEDICINE

## 2025-07-08 PROCEDURE — 99285 EMERGENCY DEPT VISIT HI MDM: CPT

## 2025-07-08 PROCEDURE — NC001 PR NO CHARGE: Performed by: INTERNAL MEDICINE

## 2025-07-08 PROCEDURE — 96374 THER/PROPH/DIAG INJ IV PUSH: CPT

## 2025-07-08 PROCEDURE — 71045 X-RAY EXAM CHEST 1 VIEW: CPT

## 2025-07-08 PROCEDURE — 94760 N-INVAS EAR/PLS OXIMETRY 1: CPT

## 2025-07-08 PROCEDURE — 84145 PROCALCITONIN (PCT): CPT | Performed by: EMERGENCY MEDICINE

## 2025-07-08 PROCEDURE — 99222 1ST HOSP IP/OBS MODERATE 55: CPT | Performed by: NURSE PRACTITIONER

## 2025-07-08 PROCEDURE — 85610 PROTHROMBIN TIME: CPT | Performed by: EMERGENCY MEDICINE

## 2025-07-08 PROCEDURE — 93005 ELECTROCARDIOGRAM TRACING: CPT

## 2025-07-08 PROCEDURE — 36415 COLL VENOUS BLD VENIPUNCTURE: CPT | Performed by: EMERGENCY MEDICINE

## 2025-07-08 PROCEDURE — 99285 EMERGENCY DEPT VISIT HI MDM: CPT | Performed by: EMERGENCY MEDICINE

## 2025-07-08 PROCEDURE — 94640 AIRWAY INHALATION TREATMENT: CPT

## 2025-07-08 PROCEDURE — 5A1D70Z PERFORMANCE OF URINARY FILTRATION, INTERMITTENT, LESS THAN 6 HOURS PER DAY: ICD-10-PCS | Performed by: INTERNAL MEDICINE

## 2025-07-08 PROCEDURE — 87081 CULTURE SCREEN ONLY: CPT | Performed by: NURSE PRACTITIONER

## 2025-07-08 PROCEDURE — 81001 URINALYSIS AUTO W/SCOPE: CPT | Performed by: NURSE PRACTITIONER

## 2025-07-08 PROCEDURE — 85730 THROMBOPLASTIN TIME PARTIAL: CPT | Performed by: EMERGENCY MEDICINE

## 2025-07-08 PROCEDURE — 70450 CT HEAD/BRAIN W/O DYE: CPT

## 2025-07-08 PROCEDURE — 80053 COMPREHEN METABOLIC PANEL: CPT | Performed by: EMERGENCY MEDICINE

## 2025-07-08 RX ORDER — OLANZAPINE 10 MG/2ML
5 INJECTION, POWDER, FOR SOLUTION INTRAMUSCULAR ONCE
Status: COMPLETED | OUTPATIENT
Start: 2025-07-08 | End: 2025-07-08

## 2025-07-08 RX ORDER — NIFEDIPINE 30 MG/1
30 TABLET, EXTENDED RELEASE ORAL
Status: DISCONTINUED | OUTPATIENT
Start: 2025-07-08 | End: 2025-07-12 | Stop reason: HOSPADM

## 2025-07-08 RX ORDER — ALLOPURINOL 100 MG/1
100 TABLET ORAL DAILY
Status: DISCONTINUED | OUTPATIENT
Start: 2025-07-08 | End: 2025-07-12 | Stop reason: HOSPADM

## 2025-07-08 RX ORDER — ACETAMINOPHEN 325 MG/1
650 TABLET ORAL EVERY 6 HOURS PRN
Status: DISCONTINUED | OUTPATIENT
Start: 2025-07-08 | End: 2025-07-10

## 2025-07-08 RX ORDER — ATORVASTATIN CALCIUM 80 MG/1
80 TABLET, FILM COATED ORAL
Status: DISCONTINUED | OUTPATIENT
Start: 2025-07-08 | End: 2025-07-12 | Stop reason: HOSPADM

## 2025-07-08 RX ORDER — METHOCARBAMOL 500 MG/1
500 TABLET, FILM COATED ORAL 3 TIMES DAILY PRN
Status: DISCONTINUED | OUTPATIENT
Start: 2025-07-08 | End: 2025-07-12 | Stop reason: HOSPADM

## 2025-07-08 RX ORDER — SEVELAMER HYDROCHLORIDE 800 MG/1
1600 TABLET, FILM COATED ORAL
Status: DISCONTINUED | OUTPATIENT
Start: 2025-07-08 | End: 2025-07-12 | Stop reason: HOSPADM

## 2025-07-08 RX ORDER — HEPARIN SODIUM 5000 [USP'U]/ML
5000 INJECTION, SOLUTION INTRAVENOUS; SUBCUTANEOUS EVERY 8 HOURS SCHEDULED
Status: DISCONTINUED | OUTPATIENT
Start: 2025-07-08 | End: 2025-07-12 | Stop reason: HOSPADM

## 2025-07-08 RX ORDER — FUROSEMIDE 20 MG/1
20 TABLET ORAL 2 TIMES DAILY
Status: DISCONTINUED | OUTPATIENT
Start: 2025-07-08 | End: 2025-07-12 | Stop reason: HOSPADM

## 2025-07-08 RX ORDER — METOPROLOL SUCCINATE 25 MG/1
25 TABLET, EXTENDED RELEASE ORAL EVERY 12 HOURS SCHEDULED
Status: DISCONTINUED | OUTPATIENT
Start: 2025-07-08 | End: 2025-07-12 | Stop reason: HOSPADM

## 2025-07-08 RX ORDER — GABAPENTIN 100 MG/1
100 CAPSULE ORAL 3 TIMES WEEKLY
Status: DISCONTINUED | OUTPATIENT
Start: 2025-07-09 | End: 2025-07-12 | Stop reason: HOSPADM

## 2025-07-08 RX ORDER — SENNOSIDES 8.6 MG
8.6 TABLET ORAL
Status: DISCONTINUED | OUTPATIENT
Start: 2025-07-08 | End: 2025-07-12 | Stop reason: HOSPADM

## 2025-07-08 RX ORDER — ASPIRIN 81 MG/1
81 TABLET, CHEWABLE ORAL DAILY
Status: DISCONTINUED | OUTPATIENT
Start: 2025-07-08 | End: 2025-07-12 | Stop reason: HOSPADM

## 2025-07-08 RX ORDER — HYDROMORPHONE HCL/PF 1 MG/ML
0.5 SYRINGE (ML) INJECTION ONCE
Status: COMPLETED | OUTPATIENT
Start: 2025-07-08 | End: 2025-07-08

## 2025-07-08 RX ORDER — IPRATROPIUM BROMIDE AND ALBUTEROL SULFATE 2.5; .5 MG/3ML; MG/3ML
3 SOLUTION RESPIRATORY (INHALATION)
Status: DISCONTINUED | OUTPATIENT
Start: 2025-07-08 | End: 2025-07-08

## 2025-07-08 RX ORDER — FAMOTIDINE 20 MG/1
20 TABLET, FILM COATED ORAL
Status: DISCONTINUED | OUTPATIENT
Start: 2025-07-08 | End: 2025-07-12 | Stop reason: HOSPADM

## 2025-07-08 RX ORDER — LIDOCAINE 50 MG/G
1 PATCH TOPICAL DAILY
Status: DISCONTINUED | OUTPATIENT
Start: 2025-07-08 | End: 2025-07-12 | Stop reason: HOSPADM

## 2025-07-08 RX ORDER — ALBUTEROL SULFATE 0.83 MG/ML
2.5 SOLUTION RESPIRATORY (INHALATION) EVERY 6 HOURS PRN
Status: DISCONTINUED | OUTPATIENT
Start: 2025-07-08 | End: 2025-07-12 | Stop reason: HOSPADM

## 2025-07-08 RX ADMIN — HYDROMORPHONE HYDROCHLORIDE 0.5 MG: 1 INJECTION, SOLUTION INTRAMUSCULAR; INTRAVENOUS; SUBCUTANEOUS at 12:46

## 2025-07-08 RX ADMIN — LIDOCAINE 1 PATCH: 50 PATCH CUTANEOUS at 18:09

## 2025-07-08 RX ADMIN — OLANZAPINE 5 MG: 10 INJECTION, POWDER, FOR SOLUTION INTRAMUSCULAR at 22:00

## 2025-07-08 RX ADMIN — IPRATROPIUM BROMIDE AND ALBUTEROL SULFATE 3 ML: .5; 3 SOLUTION RESPIRATORY (INHALATION) at 16:05

## 2025-07-08 NOTE — RESTRAINT FACE TO FACE
Restraint Face to Face   Naresh Carpio 65 y.o. male MRN: [id number]  Unit/Bed#: [address] [place] [address] Encounter: [id number]      Physical Evaluation patient seen sitting up in bed, altered mental status change not at his baseline, able to answer some questions however not others.  Moving all extremities, no focal neurodeficits noted.  Patient is smiling at times, crying at times.  Did report that his pain in his back is better after he had some medication.  Purpose for Restraints/ Seclusion High risk for causing significant disruption of treatment environment   Patient's reaction to the intervention patient tolerating, explanation provided to patient and he shook his head yes that he understood that we are just trying to keep him safe and not allow him to dislodge his dialysis catheter  Patient's medical condition stable  Patient's Behavioral condition calm after explanation provided  Restraints to be Continued

## 2025-07-08 NOTE — ASSESSMENT & PLAN NOTE
Lab Results   Component Value Date    EGFR 5 07/08/2025    EGFR 13 06/13/2025    EGFR 9 06/12/2025    CREATININE 8.89 (H) 07/08/2025    CREATININE 4.29 (H) 06/13/2025    CREATININE 5.99 (H) 06/12/2025   Patient had presented to outpatient dialysis with altered mental status change  He was sent to the emergency department for further evaluation and treatment.  Last dialysis was on Saturday which he only completed 35 minutes according to dialysis staff.  Dialysis previous to Saturday was on Thursday which was 2 hours, and he missed the Tuesday appointment entirely.  Patient is receiving dialysis this afternoon  Nephrology consulted  Monitor

## 2025-07-08 NOTE — PLAN OF CARE
Target UF Goal 2 L as tolerated. Patient dialyzing for 3 hours on 2 K bath for serum K of 5.7 per protocol. Treatment plan reviewed with Nephrology.     Post-Dialysis RN Treatment Note    Blood Pressure:  Pre 189/81 mm/Hg  Post 127/83 mmHg   EDW:  94 kg    Weight:  Pre 95.1 kg   Post 92.1 kg   Mode of weight measurement: Bed Scale   Volume Removed:  2000 ml    Treatment duration: 180 minutes    NS given:  No    Treatment shortened No   Medications given during Rx: None Reported   Estimated Kt/V:  None Reported   Access type: Permacath/TDC   Needle Gauge: na   Access Issues: No    Report called to primary nurse:   Yes Janna Isidro RN    Problem: METABOLIC, FLUID AND ELECTROLYTES - ADULT  Goal: Electrolytes maintained within normal limits  Description: INTERVENTIONS:  - Monitor labs and assess patient for signs and symptoms of electrolyte imbalances  - Administer electrolyte replacement as ordered  - Monitor response to electrolyte replacements, including repeat lab results as appropriate  - Instruct patient on fluid and nutrition as appropriate  Outcome: Progressing  Goal: Fluid balance maintained  Description: INTERVENTIONS:  - Monitor labs   - Monitor I/O and WT  - Instruct patient on fluid and nutrition as appropriate  - Assess for signs & symptoms of volume excess or deficit  Outcome: Progressing

## 2025-07-08 NOTE — ASSESSMENT & PLAN NOTE
Mental status change evidenced by patient being agitated at dialysis center, suspect secondary to patient missing dialysis and creatinine currently 8.89  CT of the head was performed which showed no acute intracranial abnormality, chronic microangiopathic, redemonstration of posterior right globe hyperdensity that is suggestive of retinal detachment  Patient currently able to state where he is at, however appears very drowsy.  Does not know the year but does know the president.  Intermittently tearful during interview, drowsy, not at his baseline.  Is able to move all extremities, no focal deficit noted.

## 2025-07-08 NOTE — ASSESSMENT & PLAN NOTE
-Patient has a history of grade 1 diastolic dysfunction.  Prior echocardiogram with EF 60 to 65%  - Patient has chronic lower extremity edema

## 2025-07-08 NOTE — ASSESSMENT & PLAN NOTE
Currently CO2 28, anion 9, chloride 94  Receiving emergent dialysis this afternoon.  Nephrology following

## 2025-07-08 NOTE — ED NOTES
Update from sending facility-   Pt was reported slightly aggressive / combative from SNF this morning but was sent to Dialysis.  Matilde reports pt requires 4hr treatments; this past Thursday he only received 2hrs, Sat only received 35min, and therapy not initiated today as pt was behaving erratically but answering questions appropriately.     Carlos Thomason RN  07/08/25 1092

## 2025-07-08 NOTE — PLAN OF CARE
Problem: Potential for Falls  Goal: Patient will remain free of falls  Description: INTERVENTIONS:  - Educate patient/family on patient safety including physical limitations  - Instruct patient to call for assistance with activity   - Consider consulting OT/PT to assist with strengthening/mobility based on AM PAC & JH-HLM score  - Consult OT/PT to assist with strengthening/mobility   - Keep Call bell within reach  - Keep bed low and locked with side rails adjusted as appropriate  - Keep care items and personal belongings within reach  - Initiate and maintain comfort rounds  - Make Fall Risk Sign visible to staff  - Offer Toileting every 2 Hours, in advance of need  - Initiate/Maintain bed/chair alarm  - Obtain necessary fall risk management equipment: bed/chair alarm  - Apply yellow socks and bracelet for high fall risk patients  - Consider moving patient to room near nurses station  Outcome: Progressing     Problem: PAIN - ADULT  Goal: Verbalizes/displays adequate comfort level or baseline comfort level  Description: Interventions:  - Encourage patient to monitor pain and request assistance  - Assess pain using appropriate pain scale  - Administer analgesics as ordered based on type and severity of pain and evaluate response  - Implement non-pharmacological measures as appropriate and evaluate response  - Consider cultural and social influences on pain and pain management  - Notify physician/advanced practitioner if interventions unsuccessful or patient reports new pain  - Educate patient/family on pain management process including their role and importance of  reporting pain   - Provide non-pharmacologic/complimentary pain relief interventions  Outcome: Progressing     Problem: INFECTION - ADULT  Goal: Absence or prevention of progression during hospitalization  Description: INTERVENTIONS:  - Assess and monitor for signs and symptoms of infection  - Monitor lab/diagnostic results  - Monitor all insertion sites,  i.e. indwelling lines, tubes, and drains  - Monitor endotracheal if appropriate and nasal secretions for changes in amount and color  - [place] appropriate cooling/warming therapies per order  - Administer medications as ordered  - Instruct and encourage patient and family to use good hand hygiene technique  - Identify and instruct in appropriate isolation precautions for identified infection/condition  Outcome: Progressing     Problem: SAFETY ADULT  Goal: Patient will remain free of falls  Description: INTERVENTIONS:  - Educate patient/family on patient safety including physical limitations  - Instruct patient to call for assistance with activity   - Consider consulting OT/PT to assist with strengthening/mobility based on AM PAC & -HL score  - Consult OT/PT to assist with strengthening/mobility   - Keep Call bell within reach  - Keep bed low and locked with side rails adjusted as appropriate  - Keep care items and personal belongings within reach  - Initiate and maintain comfort rounds  - Make Fall Risk Sign visible to staff  - Offer Toileting every 2 Hours, in advance of need  - Initiate/Maintain bed/chair alarm  - Obtain necessary fall risk management equipment: bed/chair alarm  - Apply yellow socks and bracelet for high fall risk patients  - Consider moving patient to room near nurses station  Outcome: Progressing  Goal: Maintain or return to baseline ADL function  Description: INTERVENTIONS:  -  Assess patient's ability to carry out ADLs; assess patient's baseline for ADL function and identify physical deficits which impact ability to perform ADLs (bathing, care of mouth/teeth, toileting, grooming, dressing, etc.)  - Assess/evaluate cause of self-care deficits   - Assess range of motion  - Assess patient's mobility; develop plan if impaired  - Assess patient's need for assistive devices and provide as appropriate  - Encourage maximum independence but intervene and supervise when necessary  - Involve family  in performance of ADLs  - Assess for home care needs following discharge   - Consider OT consult to assist with ADL evaluation and planning for discharge  - Provide patient education as appropriate  - Monitor functional capacity and physical performance, use of AM PAC & JH-HLM   - Monitor gait, balance and fatigue with ambulation    Outcome: Progressing  Goal: Maintains/Returns to pre admission functional level  Description: INTERVENTIONS:  - Perform AM-PAC 6 Click Basic Mobility/ Daily Activity assessment daily.  - Set and communicate daily mobility goal to care team and patient/family/caregiver.   - Collaborate with rehabilitation services on mobility goals if consulted  - Perform Range of Motion 3 times a day.  - Reposition patient every 3 hours.  - Dangle patient 3 times a day  - Stand patient 3 times a day  - Ambulate patient 3 times a day  - Out of bed to chair 3 times a day   - Out of bed for meals 3 times a day  - Out of bed for toileting  - Record patient progress and toleration of activity level   Outcome: Progressing     Problem: DISCHARGE PLANNING  Goal: Discharge to home or other facility with appropriate resources  Description: INTERVENTIONS:  - Identify barriers to discharge w/patient and caregiver  - Arrange for needed discharge resources and transportation as appropriate  - Identify discharge learning needs (meds, wound care, etc.)  - Arrange for interpretive services to assist at discharge as needed  - Refer to Case Management Department for coordinating discharge planning if the patient needs post-hospital services based on physician/advanced practitioner order or complex needs related to functional status, cognitive ability, or social support system  Outcome: Progressing     Problem: Knowledge Deficit  Goal: Patient/family/caregiver demonstrates understanding of disease process, treatment plan, medications, and discharge instructions  Description: Complete learning assessment and assess knowledge  base.  Interventions:  - Provide teaching at level of understanding  - Provide teaching via preferred learning methods  Outcome: Progressing

## 2025-07-08 NOTE — H&P
H&P - Hospitalist   Name: Naresh Carpio 65 y.o. male I MRN: [id number]  Unit/Bed#: [address] [place], [place] [id number] I Date of Admission: 7/8/2025   Date of Service: 7/8/2025 I Hospital Day: 0     Assessment & Plan  AMS (altered mental status)  Mental status change evidenced by patient being agitated at dialysis center, suspect secondary to patient missing dialysis and creatinine currently 8.89  CT of the head was performed which showed no acute intracranial abnormality, chronic microangiopathic, redemonstration of posterior right globe hyperdensity that is suggestive of retinal detachment  Patient currently able to state where he is at, however appears very drowsy.  Does not know the year but does know the president.  Intermittently tearful during interview, drowsy, not at his baseline.  Is able to move all extremities, no focal deficit noted.  ESRD (end stage renal disease) ([facility])  Lab Results   Component Value Date    EGFR 5 07/08/2025    EGFR 13 06/13/2025    EGFR 9 06/12/2025    CREATININE 8.89 (H) 07/08/2025    CREATININE 4.29 (H) 06/13/2025    CREATININE 5.99 (H) 06/12/2025   Patient had presented to outpatient dialysis with altered mental status change  He was sent to the emergency department for further evaluation and treatment.  Last dialysis was on Saturday which he only completed 35 minutes according to dialysis staff.  Dialysis previous to Saturday was on Thursday which was 2 hours, and he missed the Tuesday appointment entirely.  Patient is receiving dialysis this afternoon  Nephrology consulted  Monitor  Agitation  Patient demonstrated agitation at the dialysis clinic which is unlike the patient, suspect secondary to missing dialysis, elevated creatinine  Undergoing emergent dialysis this afternoon.  Initially requiring bilateral wrist restraints to prevent patient from interfering with equipment and lines.  Patient was able to say that he was at [facility], was not sure of the year, knew the  president.  Became tearful during interview, holding providers hand and saying thank you.  Supportive care, anticipate patient will return to baseline after dialysis/toxins removed  Monitor  Anemia  Anemia chronic suspect secondary to ESRD  Hemoglobin stable at 10.3  CBC in a.m.  Electrolyte imbalance risk  Potassium currently 5.7  Receiving dialysis this afternoon  Repeat labs in a.m.  CHF (congestive heart failure) (HCC)  Wt Readings from Last 3 Encounters:   07/08/25 97 kg (213 lb 13.5 oz)   06/07/25 89.7 kg (197 lb 12 oz)   05/13/25 92.6 kg (204 lb 3.2 oz)       BNP noted to be elevated at 3081  As noted above patient has not been compliant with the sessions  Emergent dialysis performed this afternoon  Nephrology following, appreciate input      At risk for acid-base imbalance  Currently CO2 28, anion 9, chloride 94  Receiving emergent dialysis this afternoon.  Nephrology following  HTN (hypertension)  Patient has a history of hypertension  Will continue furosemide 20 mg p.o. twice daily, metoprolol 25 mg p.o. every 12 hours, nifedipine 30 mg p.o.  Initial blood pressure 200/87  Receiving dialysis this afternoon, monitor closely  Secondary hyperparathyroidism (HCC)        VTE Pharmacologic Prophylaxis:   Moderate Risk (Score 3-4) - Pharmacological DVT Prophylaxis Ordered: heparin.  Code Status: Level 1 - Full Code   Discussion with family: Attempted to update  (sister) via phone. Left voicemail.     Anticipated Length of Stay: Patient will be admitted on an observation basis with an anticipated length of stay of less than 2 midnights secondary to altered mental status change, elevated creatinine, emergent dialysis, anticipate dialysis again in a.m., nephrology consultation, repeat labs.    History of Present Illness   Chief Complaint: Altered mental status change    Nraesh Carpio is a 65 y.o. male with a PMH of A-fib, bradycardia, diabetes, ESRD on dialysis TTS, CAD hyperkalemia, HLD, HTN, left  toe amputation, right partial first ray resection, TIA, rhabdomyolysis who presents with altered mental status change with some agitation at the dialysis clinic.  Dialysis clinic staff reported that patient's last dialysis was on Saturday which was only 35 minutes, previous 1 to that was Thursday for 2 hours and patient did not have dialysis on the Tuesday previous.  Patient's creatinine noted to be 8.89, normally in the fours and fives.  He was brought to the emergency department for further evaluation and treatment.  CT of the head was performed showed no acute intracranial abnormality, chronic microangiopathic changes, redemonstration of posterior right globe hyperdensity that is suggestive of retinal detachment as per radiology report.  The patient was not able to participate in review of system due to being AMS suspect secondary to elevated creatinine.  He is undergoing emergent dialysis this afternoon appreciate nephrology input.  Will continue to monitor patient's mental status, anticipate improvement with dialysis.  Attempted to call patient's sister, left a message with update on patient being admitted in which room he was in..    Review of Systems   Unable to perform ROS: Mental status change       Historical Information   Past Medical History[1]  Past Surgical History[2]  Social History[3]  E-Cigarette/Vaping    E-Cigarette Use Never User      E-Cigarette/Vaping Substances    Nicotine No     THC No     CBD No     Flavoring No     Other No     Unknown No      Family History[4]  Social History:  Marital Status:    Occupation: Disabled  Patient Pre-hospital Living Situation: Uncertain at this time as patient has AMS and sister did not  phone  Patient Pre-hospital Level of Mobility: walks with cane  Patient Pre-hospital Diet Restrictions: Renal    Meds/Allergies   Records from dialysis center.   Prior to Admission medications    Medication Sig Start Date End Date Taking? Authorizing Provider    acetaminophen (TYLENOL) 325 mg tablet Take 2 tablets (650 mg total) by mouth every 6 (six) hours as needed for mild pain 5/6/25   GRANT Dawson   allopurinol (ZYLOPRIM) 100 mg tablet Take 1 tablet (100 mg total) by mouth daily 5/6/25 1/7/26  GRANT Dawson   aspirin 81 mg chewable tablet Chew 1 tablet (81 mg total) daily 5/16/25   Joey Gurrola DO   atorvastatin (LIPITOR) 80 mg tablet Take 1 tablet (80 mg total) by mouth daily 5/6/25   GRANT Dawson   famotidine (PEPCID) 20 mg tablet Take 1 tablet (20 mg total) by mouth daily at bedtime 6/11/25   Allegra Carrero MD   furosemide (LASIX) 20 mg tablet Take 20 mg by mouth 2 (two) times a day    Historical Provider, MD   gabapentin (NEURONTIN) 100 mg capsule Take 1 capsule (100 mg total) by mouth 3 (three) times a week 4/1/25   Nallely Pisano PA-C   ipratropium-albuterol (DUO-NEB) 0.5-2.5 mg/3 mL nebulizer solution Take 3 mL by nebulization 6 (six) times a day    Historical Provider, MD   lidocaine (LIDODERM) 5 % Apply 1 patch topically over 12 hours daily Remove & Discard patch within 12 hours or as directed by MD 5/13/25   Nirav Bruce MD   melatonin 3 mg Take 2 tablets (6 mg total) by mouth daily at bedtime 5/7/25   Santos Guan DO   methocarbamol (ROBAXIN) 500 mg tablet Take 1 tablet (500 mg total) by mouth 3 (three) times a day as needed for muscle spasms 5/7/25   Santos Guan DO   metoprolol succinate (TOPROL-XL) 25 mg 24 hr tablet Take 1 tablet (25 mg total) by mouth every 12 (twelve) hours 6/11/25   Allegra Carrero MD   NIFEdipine (PROCARDIA XL) 30 mg 24 hr tablet Take 1 tablet (30 mg total) by mouth daily after dinner 5/6/25   GRANT Dawson   oxyCODONE (ROXICODONE) 5 immediate release tablet Take 0.5 tablets (2.5 mg total) by mouth every 8 (eight) hours as needed for moderate pain or severe pain Max Daily Amount: 7.5 mg 6/11/25   MD will Rodriguez (SENOKOT) 8.6 mg Take 1 tablet (8.6 mg total) by  mouth daily with lunch 5/7/25   Sanots Guan DO   sevelamer (RENAGEL) 800 mg tablet Take 2 tablets (1,600 mg total) by mouth 3 (three) times a day with meals 5/6/25   GRANT Dawson     No Known Allergies    Objective :  Temp:  [97.6 °F (36.4 °C)-98 °F (36.7 °C)] 97.6 °F (36.4 °C)  HR:  [56-67] 62  BP: (141-200)/(65-87) 187/71  Resp:  [14-20] 14  SpO2:  [93 %-98 %] 94 %  O2 Device: None (Room air)    Physical Exam  Vitals and nursing note reviewed.   Constitutional:       Comments: Chronically ill-appearing gentleman   HENT:      Head: Normocephalic.      Mouth/Throat:      Mouth: Mucous membranes are dry.     Eyes:      Conjunctiva/sclera: Conjunctivae normal.       Cardiovascular:      Rate and Rhythm: Normal rate and regular rhythm.      Pulses: Normal pulses.   Pulmonary:      Effort: Pulmonary effort is normal.      Breath sounds: Normal breath sounds.   Abdominal:      General: There is no distension.      Palpations: Abdomen is soft.      Tenderness: There is no abdominal tenderness.   Genitourinary:     Comments: Spontaneously voiding    Musculoskeletal:      Cervical back: Normal range of motion.     Skin:     Capillary Refill: Capillary refill takes less than 2 seconds.      Comments: Bilateral lower extremities have dressings on     Neurological:      Comments: Patient was able to tell that he was at the [facility], the president but not the year.  No focal deficits noted   Psychiatric:      Comments: Patient drowsy, tearful at times during conversation.  He was reaching for his dialysis catheter, initially required soft wrist restraints to prevent him from pulling at lines          Lines/Drains:  Lines/Drains/Airways       Active Status       Name Placement date Placement time Site Days    HD Permanent Double Catheter 08/30/23  --  Internal jugular  678                          Lab Results: I have reviewed the following results:  Results from last 7 days   Lab Units 07/08/25  8629    WBC Thousand/uL 5.83   HEMOGLOBIN g/dL 10.3*   HEMATOCRIT % 34.5*   PLATELETS Thousands/uL 198   SEGS PCT % 58   LYMPHO PCT % 23   MONO PCT % 14*   EOS PCT % 4     Results from last 7 days   Lab Units 07/08/25  1144   SODIUM mmol/L 131*   POTASSIUM mmol/L 5.7*   CHLORIDE mmol/L 94*   CO2 mmol/L 28   BUN mg/dL 54*   CREATININE mg/dL 8.89*   ANION GAP mmol/L 9   CALCIUM mg/dL 9.1   ALBUMIN g/dL 3.6   TOTAL BILIRUBIN mg/dL 0.58   ALK PHOS U/L 78   ALT U/L 5*   AST U/L 8*   GLUCOSE RANDOM mg/dL 86     Results from last 7 days   Lab Units 07/08/25  1144   INR  1.09         Lab Results   Component Value Date    HGBA1C 4.8 06/04/2025    HGBA1C 5.7 (H) 02/09/2025    HGBA1C 5.5 10/08/2024     Results from last 7 days   Lab Units 07/08/25  1144   PROCALCITONIN ng/ml 0.25       Imaging Results Review: I reviewed radiology reports from this admission including: CT head.  Other Study Results Review: EKG was reviewed.     Administrative Statements   I have spent a total time of greater than45 minutes in caring for this patient on the day of the visit/encounter including Counseling / Coordination of care, Documenting in the medical record, Reviewing/placing orders in the medical record (including tests, medications, and/or procedures), Obtaining or reviewing history  , and Communicating with other healthcare professionals .    ** Please Note: This note has been constructed using a voice recognition system. **         [1]   Past Medical History:  Diagnosis Date    Acute cystitis 10/18/2024    Acute on chronic anemia 01/23/2022    Atrial fibrillation (HCC)     Bilateral sacral fracture, closed (HCC) 07/03/2024    Bradycardia 09/05/2023    Diabetes mellitus (HCC)     Diabetes mellitus type 2 with peripheral artery disease (HCC)     ESRD (end stage renal disease) on dialysis (HCC)     Hematuria 10/10/2024    Hematuria 10/10/2024    Hyperkalemia 04/06/2024    Hyperkalemia 04/06/2024    Hyperlipidemia     Hypertension     Left toe  amputee (HCC)     Subacute osteomyelitis of right foot (HCC)     TIA (transient ischemic attack)     Toxic metabolic encephalopathy 10/08/2024    Traumatic rhabdomyolysis (HCC) 08/19/2023   [2]   Past Surgical History:  Procedure Laterality Date    AMPUTATION Left     left foot resection 10 years ago dr tran    HEMODIALYSIS ADULT  1/18/2025    HEMODIALYSIS ADULT  2/27/2025    HEMODIALYSIS ADULT  6/4/2025    IR EMBOLIZATION (SPECIFY VESSEL OR SITE)  4/3/2025    IR EMBOLIZATION (SPECIFY VESSEL OR SITE)  4/7/2025    IR LOWER EXTREMITY ANGIOGRAM  08/29/2023    IR TEMPORARY DIALYSIS CATHETER PLACEMENT  08/25/2023    IR TUNNELED CENTRAL LINE REMOVAL  08/23/2023    IR TUNNELED DIALYSIS CATHETER PLACEMENT  01/27/2022    IR TUNNELED DIALYSIS CATHETER PLACEMENT  08/30/2023    OR AMPUTATION METATARSAL W/TOE SINGLE Right 8/31/2023    Procedure: RIGHT PARTIAL 1ST RAY RESECTION WITH  WOUND VAC APPLICATION;  Surgeon: Won Parmar DPM;  Location: [facility];  Service: Podiatry   [3]   Social History  Tobacco Use    Smoking status: Never     Passive exposure: Never    Smokeless tobacco: Never   Vaping Use    Vaping status: Never Used   Substance and Sexual Activity    Alcohol use: Not Currently     Alcohol/week: 0.0 standard drinks of alcohol     Comment: 0    Drug use: Never   [4] No family history on file.

## 2025-07-08 NOTE — ASSESSMENT & PLAN NOTE
Patient has a history of hypertension  Will continue furosemide 20 mg p.o. twice daily, metoprolol 25 mg p.o. every 12 hours, nifedipine 30 mg p.o.  Initial blood pressure 200/87  Receiving dialysis this afternoon, monitor closely

## 2025-07-08 NOTE — ED PROVIDER NOTES
Time reflects when diagnosis was documented in both MDM as applicable and the Disposition within this note       Time User Action Codes Description Comment    7/8/2025 11:32 AM Pete Fenton Add [N18.6,  Z99.2] ESRD (end stage renal disease) on dialysis (HCC)     7/8/2025  1:58 PM Pete Fenton Add [R41.82] Altered mental status     7/8/2025  1:58 PM Pete Fenton Add [Z99.2] Dependence on renal dialysis (HCC)     7/8/2025  1:58 PM Pete Fenton Add [E87.5] Hyperkalemia           ED Disposition       ED Disposition   Admit    Condition   Stable    Date/Time   Tue Jul 8, 2025  1:58 PM    Comment   Case was discussed with hospitalist and the patient's admission status was agreed to be observation to the service of Dr. Bruce.               Assessment & Plan       Medical Decision Making  Patient has not been able to tolerate full dialysis treatments in the last 2 treatment and is due today.  He was reportedly combative towards nursing home staff about going today.    Patient was poorly cooperative with nephrologist who saw the patient in the ED.  They are requesting CT scan of the head before proceeding with dialysis.  Patient was refusing to lay down for CT scan.  Medicated for pain and to help facilitate the study    Amount and/or Complexity of Data Reviewed  Labs: ordered.  Radiology: ordered.    Risk  Prescription drug management.  Decision regarding hospitalization.             Medications   HYDROmorphone (DILAUDID) injection 0.5 mg (0.5 mg Intravenous Given 7/8/25 1246)       ED Risk Strat Scores                    (ISAR) Identification of Seniors at Risk  Before the illness or injury that brought you to the Emergency, did you need someone to help you on a regular basis?: 0  In the last 24 hours, have you needed more help than usual?: 0  Have you been hospitalized for one or more nights during the past 6 months?: 0  In general, do you see well?: 1  In general, do you have serious problems with  "your memory?: 0  Do you take more than three different medications every day?: 0  ISAR Score: 1            SBIRT 20yo+      Flowsheet Row Most Recent Value   Initial Alcohol Screen: US AUDIT-C     1. How often do you have a drink containing alcohol? 0 Filed at: 07/08/2025 1115   Audit-C Score 0 Filed at: 07/08/2025 1115   NICOLÁS: How many times in the past year have you...    Used an illegal drug or used a prescription medication for non-medical reasons? Never Filed at: 07/08/2025 1115                            History of Present Illness       Chief Complaint   Patient presents with    Altered Mental Status     Pt arrives from nursing home, reports he was altered. At time able to answer orientation questions appropriately .        Past Medical History[1]   Past Surgical History[2]   Family History[3]   Social History[4]   E-Cigarette/Vaping    E-Cigarette Use Never User       E-Cigarette/Vaping Substances    Nicotine No     THC No     CBD No     Flavoring No     Other No     Unknown No       I have reviewed and agree with the history as documented.     Patient has been on hemodialysis for end-stage renal disease for about 2 years.  Being done through a port in the right subclavian.  Nursing home staff report getting incomplete dialysis the last 2 sessions and reportedly only 35 minutes on Saturday.  Patient states he could not tolerated due to his neuropathy.  He complains of pain \"all over\".  Denies chest pain        Review of Systems   Constitutional:  Negative for chills and fever.   HENT:  Negative for congestion.    Eyes:  Negative for visual disturbance.   Respiratory:  Negative for cough.    Cardiovascular:  Positive for leg swelling. Negative for chest pain.   Gastrointestinal:  Negative for abdominal pain and vomiting.   Genitourinary:  Positive for decreased urine volume.   Musculoskeletal:  Positive for arthralgias, back pain, joint swelling and myalgias.   Skin:  Negative for rash.   Neurological:  " Positive for weakness. Negative for headaches.   Hematological:  Bruises/bleeds easily.   Psychiatric/Behavioral:  Positive for behavioral problems.    All other systems reviewed and are negative.          Objective       ED Triage Vitals   Temperature Pulse Blood Pressure Respirations SpO2 Patient Position - Orthostatic VS   07/08/25 1113 07/08/25 1113 07/08/25 1113 07/08/25 1113 07/08/25 1113 07/08/25 1200   98 °F (36.7 °C) 58 (!) 200/87 20 98 % Lying      Temp Source Heart Rate Source BP Location FiO2 (%) Pain Score    07/08/25 1113 -- 07/08/25 1200 -- 07/08/25 1246    Tympanic  Left arm  10 - Worst Possible Pain      Vitals      Date and Time Temp Pulse SpO2 Resp BP Pain Score FACES Pain Rating User   07/08/25 1300 -- 60 93 % 14 171/72 -- -- SF   07/08/25 1246 -- -- -- -- -- 10 - Worst Possible Pain -- JCM   07/08/25 1200 -- 56 97 % 15 164/72 -- -- JG   07/08/25 1113 98 °F (36.7 °C) 58 98 % 20 200/87 -- -- DQ            Physical Exam  Vitals and nursing note reviewed.   Constitutional:       Appearance: He is ill-appearing.   HENT:      Head: Normocephalic.      Mouth/Throat:      Mouth: Mucous membranes are moist.     Eyes:      General: No scleral icterus.      Cardiovascular:      Rate and Rhythm: Normal rate and regular rhythm.      Heart sounds: Normal heart sounds.   Pulmonary:      Effort: Pulmonary effort is normal.      Breath sounds: Rales present.   Abdominal:      Palpations: Abdomen is soft.      Tenderness: There is no abdominal tenderness.     Musculoskeletal:         General: Swelling and tenderness present.      Cervical back: Normal range of motion.     Skin:     General: Skin is warm and dry.      Capillary Refill: Capillary refill takes less than 2 seconds.     Neurological:      Mental Status: He is alert.      Cranial Nerves: No facial asymmetry.         Results Reviewed       Procedure Component Value Units Date/Time    Procalcitonin [[id number]]  (Normal) Collected: 07/08/25 1144    Lab  Status: Final result Specimen: Blood from Arm, Left Updated: 07/08/25 1222     Procalcitonin 0.25 ng/ml     B-Type Natriuretic Peptide(BNP) [[id number]]  (Abnormal) Collected: 07/08/25 1144    Lab Status: Final result Specimen: Blood from Arm, Left Updated: 07/08/25 1218     BNP 3,081 pg/mL     Comprehensive metabolic panel [[id number]]  (Abnormal) Collected: 07/08/25 1144    Lab Status: Final result Specimen: Blood from Arm, Left Updated: 07/08/25 1214     Sodium 131 mmol/L      Potassium 5.7 mmol/L      Chloride 94 mmol/L      CO2 28 mmol/L      ANION GAP 9 mmol/L      BUN 54 mg/dL      Creatinine 8.89 mg/dL      Glucose 86 mg/dL      Calcium 9.1 mg/dL      AST 8 U/L      ALT 5 U/L      Alkaline Phosphatase 78 U/L      Total Protein 7.3 g/dL      Albumin 3.6 g/dL      Total Bilirubin 0.58 mg/dL      eGFR 5 ml/min/1.73sq m     Narrative:      National Kidney Disease Foundation guidelines for Chronic Kidney Disease (CKD):     Stage 1 with normal or high GFR (GFR > 90 mL/min/1.73 square meters)    Stage 2 Mild CKD (GFR = 60-89 mL/min/1.73 square meters)    Stage 3A Moderate CKD (GFR = 45-59 mL/min/1.73 square meters)    Stage 3B Moderate CKD (GFR = 30-44 mL/min/1.73 square meters)    Stage 4 Severe CKD (GFR = 15-29 mL/min/1.73 square meters)    Stage 5 End Stage CKD (GFR <15 mL/min/1.73 square meters)  Note: GFR calculation is accurate only with a steady state creatinine    Protime-INR [[id number]]  (Normal) Collected: 07/08/25 1144    Lab Status: Final result Specimen: Blood from Arm, Left Updated: 07/08/25 1209     Protime 14.6 seconds      INR 1.09    Narrative:      INR Therapeutic Range    Indication                                             INR Range      Atrial Fibrillation                                               2.0-3.0  Hypercoagulable State                                    2.0.2.3  Left Ventricular Asist Device                            2.0-3.0  Mechanical Heart Valve                                   -    Aortic(with afib, MI, embolism, HF, LA enlargement,    and/or coagulopathy)                                     2.0-3.0 (2.5-3.5)     Mitral                                                             2.5-3.5  Prosthetic/Bioprosthetic Heart Valve               2.0-3.0  Venous thromboembolism (VTE: VT, PE        2.0-3.0    APTT [[id number]]  (Normal) Collected: 07/08/25 1144    Lab Status: Final result Specimen: Blood from Arm, Left Updated: 07/08/25 1209     PTT 32 seconds     CBC and differential [[id number]]  (Abnormal) Collected: 07/08/25 1144    Lab Status: Final result Specimen: Blood from Arm, Left Updated: 07/08/25 1150     WBC 5.83 Thousand/uL      RBC 3.62 Million/uL      Hemoglobin 10.3 g/dL      Hematocrit 34.5 %      MCV 95 fL      MCH 28.5 pg      MCHC 29.9 g/dL      RDW 15.7 %      MPV 9.6 fL      Platelets 198 Thousands/uL      nRBC 0 /100 WBCs      Segmented % 58 %      Immature Grans % 0 %      Lymphocytes % 23 %      Monocytes % 14 %      Eosinophils Relative 4 %      Basophils Relative 1 %      Absolute Neutrophils 3.37 Thousands/µL      Absolute Immature Grans 0.02 Thousand/uL      Absolute Lymphocytes 1.33 Thousands/µL      Absolute Monocytes 0.81 Thousand/µL      Eosinophils Absolute 0.25 Thousand/µL      Basophils Absolute 0.05 Thousands/µL             CT head without contrast   Final Interpretation by Shola Montiel MD (07/08 1350)      No acute intracranial abnormality.  Chronic microangiopathic changes.   Redemonstration of posterior right globe hyperdensity that again is suggestive of retinal detachment. Correlate clinically.               Workstation performed: [id number]         XR chest 1 view portable    (Results Pending)       ECG 12 Lead Documentation Only    Date/Time: 7/8/2025 11:59 AM    Performed by: Pete Fenton MD  Authorized by: Pete Fenton MD    Indications / Diagnosis:  AMS  ECG reviewed by me, the ED Provider: yes    Patient location:  ED  Interpretation:      Interpretation: abnormal    Rate:     ECG rate:  57    ECG rate assessment: bradycardic    Rhythm:     Rhythm: sinus rhythm    Ectopy:     Ectopy: none    QRS:     QRS axis:  Normal    QRS intervals:  Normal  Conduction:     Conduction: normal    ST segments:     ST segments:  Normal  T waves:     T waves: inverted      Inverted:  AVL      ED Medication and Procedure Management   Prior to Admission Medications   Prescriptions Last Dose Informant Patient Reported? Taking?   NIFEdipine (PROCARDIA XL) 30 mg 24 hr tablet  Outside Facility (Specify) No No   Sig: Take 1 tablet (30 mg total) by mouth daily after dinner   acetaminophen (TYLENOL) 325 mg tablet  Outside Facility (Specify) No No   Sig: Take 2 tablets (650 mg total) by mouth every 6 (six) hours as needed for mild pain   allopurinol (ZYLOPRIM) 100 mg tablet  Outside Facility (Specify) No No   Sig: Take 1 tablet (100 mg total) by mouth daily   aspirin 81 mg chewable tablet   No No   Sig: Chew 1 tablet (81 mg total) daily   atorvastatin (LIPITOR) 80 mg tablet  Outside Facility (Specify) No No   Sig: Take 1 tablet (80 mg total) by mouth daily   famotidine (PEPCID) 20 mg tablet   No No   Sig: Take 1 tablet (20 mg total) by mouth daily at bedtime   furosemide (LASIX) 20 mg tablet   Yes No   Sig: Take 20 mg by mouth 2 (two) times a day   gabapentin (NEURONTIN) 100 mg capsule  Outside Facility (Specify) No No   Sig: Take 1 capsule (100 mg total) by mouth 3 (three) times a week   ipratropium-albuterol (DUO-NEB) 0.5-2.5 mg/3 mL nebulizer solution   Yes No   Sig: Take 3 mL by nebulization 6 (six) times a day   lidocaine (LIDODERM) 5 %  Outside Facility (Specify) No No   Sig: Apply 1 patch topically over 12 hours daily Remove & Discard patch within 12 hours or as directed by MD   melatonin 3 mg  Outside Facility (Specify) No No   Sig: Take 2 tablets (6 mg total) by mouth daily at bedtime   methocarbamol (ROBAXIN) 500 mg tablet  Outside Facility (Specify) No No   Sig: Take  1 tablet (500 mg total) by mouth 3 (three) times a day as needed for muscle spasms   metoprolol succinate (TOPROL-XL) 25 mg 24 hr tablet   No No   Sig: Take 1 tablet (25 mg total) by mouth every 12 (twelve) hours   oxyCODONE (ROXICODONE) 5 immediate release tablet   No No   Sig: Take 0.5 tablets (2.5 mg total) by mouth every 8 (eight) hours as needed for moderate pain or severe pain Max Daily Amount: 7.5 mg   senna (SENOKOT) 8.6 mg  Outside Facility (Specify) No No   Sig: Take 1 tablet (8.6 mg total) by mouth daily with lunch   sevelamer (RENAGEL) 800 mg tablet  Outside Facility (Specify) No No   Sig: Take 2 tablets (1,600 mg total) by mouth 3 (three) times a day with meals      Facility-Administered Medications: None     Patient's Medications   Discharge Prescriptions    No medications on file     No discharge procedures on file.  ED SEPSIS DOCUMENTATION   Time reflects when diagnosis was documented in both MDM as applicable and the Disposition within this note       Time User Action Codes Description Comment    7/8/2025 11:32 AM Pete Fenton Add [N18.6,  Z99.2] ESRD (end stage renal disease) on dialysis ([facility])     7/8/2025  1:58 PM Pete Fenton Add [R41.82] Altered mental status     7/8/2025  1:58 PM Pete Fenton Add [Z99.2] Dependence on renal dialysis (HCC)     7/8/2025  1:58 PM Pete Fenton Add [E87.5] Hyperkalemia                      [1]   Past Medical History:  Diagnosis Date    Acute cystitis 10/18/2024    Acute on chronic anemia 01/23/2022    Atrial fibrillation (HCC)     Bilateral sacral fracture, closed (HCC) 07/03/2024    Bradycardia 09/05/2023    Diabetes mellitus (HCC)     Diabetes mellitus type 2 with peripheral artery disease (HCC)     ESRD (end stage renal disease) on dialysis ([facility])     Hematuria 10/10/2024    Hematuria 10/10/2024    Hyperkalemia 04/06/2024    Hyperkalemia 04/06/2024    Hyperlipidemia     Hypertension     Left toe amputee (HCC)     Subacute osteomyelitis of right  foot (HCC)     TIA (transient ischemic attack)     Toxic metabolic encephalopathy 10/08/2024    Traumatic rhabdomyolysis (HCC) 08/19/2023   [2]   Past Surgical History:  Procedure Laterality Date    AMPUTATION Left     left foot resection 10 years ago dr tran    HEMODIALYSIS ADULT  1/18/2025    HEMODIALYSIS ADULT  2/27/2025    HEMODIALYSIS ADULT  6/4/2025    IR EMBOLIZATION (SPECIFY VESSEL OR SITE)  4/3/2025    IR EMBOLIZATION (SPECIFY VESSEL OR SITE)  4/7/2025    IR LOWER EXTREMITY ANGIOGRAM  08/29/2023    IR TEMPORARY DIALYSIS CATHETER PLACEMENT  08/25/2023    IR TUNNELED CENTRAL LINE REMOVAL  08/23/2023    IR TUNNELED DIALYSIS CATHETER PLACEMENT  01/27/2022    IR TUNNELED DIALYSIS CATHETER PLACEMENT  08/30/2023    DE AMPUTATION METATARSAL W/TOE SINGLE Right 8/31/2023    Procedure: RIGHT PARTIAL 1ST RAY RESECTION WITH  WOUND VAC APPLICATION;  Surgeon: Won Parmar DPM;  Location: [facility] OR;  Service: Podiatry   [3] No family history on file.  [4]   Social History  Tobacco Use    Smoking status: Never     Passive exposure: Never    Smokeless tobacco: Never   Vaping Use    Vaping status: Never Used   Substance Use Topics    Alcohol use: Not Currently     Alcohol/week: 0.0 standard drinks of alcohol     Comment: 0    Drug use: Never        Pete Fenton MD  07/08/25 1400

## 2025-07-08 NOTE — ASSESSMENT & PLAN NOTE
Wt Readings from Last 3 Encounters:   07/08/25 97 kg (213 lb 13.5 oz)   06/07/25 89.7 kg (197 lb 12 oz)   05/13/25 92.6 kg (204 lb 3.2 oz)       BNP noted to be elevated at 3081  As noted above patient has not been compliant with the sessions  Emergent dialysis performed this afternoon  Nephrology following, appreciate input

## 2025-07-08 NOTE — ASSESSMENT & PLAN NOTE
Potassium currently 5.7  Receiving dialysis this afternoon  Repeat labs in a.m.   Scheduled 4-27-22

## 2025-07-08 NOTE — ASSESSMENT & PLAN NOTE
Patient demonstrated agitation at the dialysis clinic which is unlike the patient, suspect secondary to missing dialysis, elevated creatinine  Undergoing emergent dialysis this afternoon.  Initially requiring bilateral wrist restraints to prevent patient from interfering with equipment and lines.  Patient was able to say that he was at [facility], was not sure of the year, knew the president.  Became tearful during interview, holding providers hand and saying thank you.  Supportive care, anticipate patient will return to baseline after dialysis/toxins removed  Monitor

## 2025-07-08 NOTE — ASSESSMENT & PLAN NOTE
-Outpatient unit [facility] TTS due to recent rehab admission.  Was prior at [facility]  - Access-right IJ PermCath  - Prior estimated dry weight on discharge in June 2025 was 89.5 kg but dry weight was being challenged with dialysis  - Noted patient only had partial treatment last Thursday and only had 35 minutes of treatment on Saturday.  Was sent to the hospital on July 8 with erratic behavior  - Outpatient schedule-TTS  - Patient has been noncompliant with outpatient dialysis per my discussion with outpatient dialysis nurse-for at least the past 2 weeks he has been shortening treatment to 2-hour treatments and this past Saturday only did 30-minute treatments    Plan  - Workup for encephalopathy in progress by primary team  - Please consider CT of the head first prior to dialysis today to ensure there is no acute stroke or bleed  - Dialysis after CT of the head once the patient has a bed assignment as we cannot dialyze in the ER  - Consider urinalysis and urine culture check  - Lower flow with dialysis as the patient appears to have had poor dialysis treatments due to poor compliance over the past 2 weeks per my discussion with [facility] nurse  - Reviewed case with ER attending  - Notified HD charge nurse for dialysis planning this afternoon

## 2025-07-08 NOTE — ASSESSMENT & PLAN NOTE
Etiology unclear  - CT head pending  - Consider UA  - There may be a uremic component as the patient has had poor dialysis over the past at least 2 weeks

## 2025-07-08 NOTE — CONSULTS
NEPHROLOGY HOSPITAL CONSULTATION   Naresh Carpio 65 y.o. male MRN: [id number]  Unit/Bed#: ED 06 Encounter: [id number]    Brief History of Admission -see HPI    Assessment & Plan  ESRD (end stage renal disease) (HCC)  -Outpatient unit [facility] TTS due to recent rehab admission.  Was prior at [facility]  - Access-right IJ PermCath  - Prior estimated dry weight on discharge in June 2025 was 89.5 kg but dry weight was being challenged with dialysis  - Noted patient only had partial treatment last Thursday and only had 35 minutes of treatment on Saturday.  Was sent to the hospital on July 8 with erratic behavior  - Outpatient schedule-TTS  - Patient has been noncompliant with outpatient dialysis per my discussion with outpatient dialysis nurse-for at least the past 2 weeks he has been shortening treatment to 2-hour treatments and this past Saturday only did 30-minute treatments    Plan  - Workup for encephalopathy in progress by primary team  - Please consider CT of the head first prior to dialysis today to ensure there is no acute stroke or bleed  - Dialysis after CT of the head once the patient has a bed assignment as we cannot dialyze in the ER  - Consider urinalysis and urine culture check  - Lower flow with dialysis as the patient appears to have had poor dialysis treatments due to poor compliance over the past 2 weeks per my discussion with [facility] nurse  - Reviewed case with ER attending  - Notified HD charge nurse for dialysis planning this afternoon  Secondary hyperparathyroidism (HCC)  -Home regimen includes sevelamer  At risk for acid-base imbalance  -Bicarbonate appropriate  Electrolyte imbalance risk  -Potassium 5.7.  Likely in the setting of poor compliance with dialysis  HTN (hypertension)  -Prior was on nifedipine 30 mg daily and furosemide 20 mg twice daily which was adjusted in June 2025 to metoprolol 25 mg twice daily, nifedipine 30 mg daily  - To note, patient was noted to have bradycardia  as outpatient?  Will need to confirm if patient is on beta-blocker  CHF (congestive heart failure) (HCC)  -Patient has a history of grade 1 diastolic dysfunction.  Prior echocardiogram with EF 60 to 65%  - Patient has chronic lower extremity edema          Anemia  -Hold for now given mentation changes  - Is on Mircera and Venofer as outpatient  Agitation  Etiology unclear  - CT head pending  - Consider UA  - There may be a uremic component as the patient has had poor dialysis over the past at least 2 weeks    I have reviewed the nephrology recommendations including CT of the head, potential dialysis today, with primary ER attending, and we are in agreement with renal plan including the information outlined above.    HISTORY OF PRESENT ILLNESS:  Requesting Physician: Pete Fenton MD  Reason for Consult: ESRD    Naresh Carpio is a 65 y.o. male with past medical history of ESRD on hemodialysis at [facility] TTS, recent hospitalization in June 2025 with respiratory failure in the setting of volume overload/COVID-19 infection, hypertension, retinopathy, diastolic heart failure, paroxysmal A-fib, prior history of retroperitoneal hematoma in April after a fall and underwent IR embolization of distal right deep circumflex iliac artery and superior right gluteal artery, who was admitted to [facility] 7/8 after presenting with agitation and confusion. A renal consultation is requested today for assistance in the management of ESRD.  Patient is not providing adequate history.  I called [facility] dialysis unit and spoke with the nurse.  Per nursing team, patient was aggressive and agitated and confused and was sent to the ER.  This has been an ongoing issue over the past 1 to 2 weeks.  He has been shortening his diet treatments to approximately 2-hour treatments for the past 2 weeks and this past Saturday he only did 30 minutes of treatment.  The patient states that he is agreeable to the dialysis  "but he has no answer any other questions..    PAST MEDICAL HISTORY:  Past Medical History[1]    PAST SURGICAL HISTORY:  Past Surgical History[2]    ALLERGIES:  Allergies[3]    SOCIAL HISTORY:  Social History     Substance and Sexual Activity   Alcohol Use Not Currently    Alcohol/week: 0.0 standard drinks of alcohol    Comment: 0     Social History     Substance and Sexual Activity   Drug Use Never     Tobacco Use History[4]    FAMILY HISTORY:  Family History[5]    MEDICATIONS:  Current Medications[6]    REVIEW OF SYSTEMS:    All the systems were reviewed and were negative except as documented on the HPI.    PHYSICAL EXAM:  Current Weight: Weight - Scale: 97 kg (213 lb 13.5 oz)  First Weight: Weight - Scale: 97 kg (213 lb 13.5 oz)  Vitals:    07/08/25 1113   BP: (!) 200/87   Pulse: 58   Resp: 20   Temp: 98 °F (36.7 °C)   TempSrc: Tympanic   SpO2: 98%   Weight: 97 kg (213 lb 13.5 oz)     No intake or output data in the 24 hours ending 07/08/25 1151  Physical Exam  General: NAD  Skin: no rash  Eyes: anicteric sclera  ENT: Dry mucous membrane  Neck: supple  Chest: CTA b/l, no ronchii, no wheeze, no rubs, no rales  CVS: s1s2, no murmur, no gallop, no rub  Abdomen: soft, nontender, nl sounds, but the patient would not allow me to fully examine and lift his shirt  Extremities: 2+ edema LE b/l, tunneled dialysis catheter site covered  : no gaitan  Neuro: Is alert but patient is not answering questions in a state of agitation and therefore it is difficult for me to assess orientation  Psych: normal affect      Invasive Devices:      Lab Results:   Results from last 7 days   Lab Units 07/08/25  1144   WBC Thousand/uL 5.83   HEMOGLOBIN g/dL 10.3*   HEMATOCRIT % 34.5*   PLATELETS Thousands/uL 198         Portions of the record may have been created with voice recognition software. Occasional wrong word or \"sound a like\" substitutions may have occurred due to the inherent limitations of voice recognition software. Read the " chart carefully and recognize, using context, where substitutions have occurred.If you have any questions, please contact the dictating provider.         [1]   Past Medical History:  Diagnosis Date    Acute cystitis 10/18/2024    Acute on chronic anemia 01/23/2022    Atrial fibrillation (HCC)     Bilateral sacral fracture, closed (HCC) 07/03/2024    Bradycardia 09/05/2023    Diabetes mellitus (HCC)     Diabetes mellitus type 2 with peripheral artery disease (HCC)     ESRD (end stage renal disease) on dialysis (HCC)     Hematuria 10/10/2024    Hematuria 10/10/2024    Hyperkalemia 04/06/2024    Hyperkalemia 04/06/2024    Hyperlipidemia     Hypertension     Left toe amputee (HCC)     Subacute osteomyelitis of right foot (HCC)     TIA (transient ischemic attack)     Toxic metabolic encephalopathy 10/08/2024    Traumatic rhabdomyolysis (HCC) 08/19/2023   [2]   Past Surgical History:  Procedure Laterality Date    AMPUTATION Left     left foot resection 10 years ago dr tran    HEMODIALYSIS ADULT  1/18/2025    HEMODIALYSIS ADULT  2/27/2025    HEMODIALYSIS ADULT  6/4/2025    IR EMBOLIZATION (SPECIFY VESSEL OR SITE)  4/3/2025    IR EMBOLIZATION (SPECIFY VESSEL OR SITE)  4/7/2025    IR LOWER EXTREMITY ANGIOGRAM  08/29/2023    IR TEMPORARY DIALYSIS CATHETER PLACEMENT  08/25/2023    IR TUNNELED CENTRAL LINE REMOVAL  08/23/2023    IR TUNNELED DIALYSIS CATHETER PLACEMENT  01/27/2022    IR TUNNELED DIALYSIS CATHETER PLACEMENT  08/30/2023    OK AMPUTATION METATARSAL W/TOE SINGLE Right 8/31/2023    Procedure: RIGHT PARTIAL 1ST RAY RESECTION WITH  WOUND VAC APPLICATION;  Surgeon: Won Parmar DPM;  Location: [facility];  Service: Podiatry   [3] No Known Allergies  [4]   Social History  Tobacco Use   Smoking Status Never    Passive exposure: Never   Smokeless Tobacco Never   [5] No family history on file.  [6] No current facility-administered medications for this encounter.    Current Outpatient Medications:     acetaminophen  (TYLENOL) 325 mg tablet, Take 2 tablets (650 mg total) by mouth every 6 (six) hours as needed for mild pain, Disp: , Rfl:     allopurinol (ZYLOPRIM) 100 mg tablet, Take 1 tablet (100 mg total) by mouth daily, Disp: , Rfl:     aspirin 81 mg chewable tablet, Chew 1 tablet (81 mg total) daily, Disp: 30 tablet, Rfl: 3    atorvastatin (LIPITOR) 80 mg tablet, Take 1 tablet (80 mg total) by mouth daily, Disp: , Rfl:     famotidine (PEPCID) 20 mg tablet, Take 1 tablet (20 mg total) by mouth daily at bedtime, Disp: , Rfl:     furosemide (LASIX) 20 mg tablet, Take 20 mg by mouth 2 (two) times a day, Disp: , Rfl:     gabapentin (NEURONTIN) 100 mg capsule, Take 1 capsule (100 mg total) by mouth 3 (three) times a week, Disp: , Rfl:     ipratropium-albuterol (DUO-NEB) 0.5-2.5 mg/3 mL nebulizer solution, Take 3 mL by nebulization 6 (six) times a day, Disp: , Rfl:     lidocaine (LIDODERM) 5 %, Apply 1 patch topically over 12 hours daily Remove & Discard patch within 12 hours or as directed by MD, Disp: , Rfl:     melatonin 3 mg, Take 2 tablets (6 mg total) by mouth daily at bedtime, Disp: 30 tablet, Rfl: 0    methocarbamol (ROBAXIN) 500 mg tablet, Take 1 tablet (500 mg total) by mouth 3 (three) times a day as needed for muscle spasms, Disp: 90 tablet, Rfl: 0    metoprolol succinate (TOPROL-XL) 25 mg 24 hr tablet, Take 1 tablet (25 mg total) by mouth every 12 (twelve) hours, Disp: , Rfl:     NIFEdipine (PROCARDIA XL) 30 mg 24 hr tablet, Take 1 tablet (30 mg total) by mouth daily after dinner, Disp: 30 tablet, Rfl: 0    oxyCODONE (ROXICODONE) 5 immediate release tablet, Take 0.5 tablets (2.5 mg total) by mouth every 8 (eight) hours as needed for moderate pain or severe pain Max Daily Amount: 7.5 mg, Disp: 5 tablet, Rfl: 0    senna (SENOKOT) 8.6 mg, Take 1 tablet (8.6 mg total) by mouth daily with lunch, Disp: 30 tablet, Rfl: 0    sevelamer (RENAGEL) 800 mg tablet, Take 2 tablets (1,600 mg total) by mouth 3 (three) times a day with  meals, Disp: 180 tablet, Rfl: 0

## 2025-07-08 NOTE — ASSESSMENT & PLAN NOTE
-Prior was on nifedipine 30 mg daily and furosemide 20 mg twice daily which was adjusted in June 2025 to metoprolol 25 mg twice daily, nifedipine 30 mg daily  - To note, patient was noted to have bradycardia as outpatient?  Will need to confirm if patient is on beta-blocker

## 2025-07-09 ENCOUNTER — APPOINTMENT (INPATIENT)
Dept: DIALYSIS | Facility: HOSPITAL | Age: 65
DRG: 698 | End: 2025-07-09
Attending: INTERNAL MEDICINE
Payer: MEDICARE

## 2025-07-09 ENCOUNTER — APPOINTMENT (INPATIENT)
Dept: RADIOLOGY | Facility: HOSPITAL | Age: 65
DRG: 698 | End: 2025-07-09
Payer: MEDICARE

## 2025-07-09 LAB
ANION GAP SERPL CALCULATED.3IONS-SCNC: 11 MMOL/L (ref 4–13)
BUN SERPL-MCNC: 35 MG/DL (ref 5–25)
CALCIUM SERPL-MCNC: 8.7 MG/DL (ref 8.4–10.2)
CHLORIDE SERPL-SCNC: 96 MMOL/L (ref 96–108)
CO2 SERPL-SCNC: 25 MMOL/L (ref 21–32)
CREAT SERPL-MCNC: 6.98 MG/DL (ref 0.6–1.3)
ERYTHROCYTE [DISTWIDTH] IN BLOOD BY AUTOMATED COUNT: 15.5 % (ref 11.6–15.1)
GFR SERPL CREATININE-BSD FRML MDRD: 7 ML/MIN/1.73SQ M
GLUCOSE SERPL-MCNC: 71 MG/DL (ref 65–140)
HCT VFR BLD AUTO: 37.9 % (ref 36.5–49.3)
HGB BLD-MCNC: 11.4 G/DL (ref 12–17)
MAGNESIUM SERPL-MCNC: 2.2 MG/DL (ref 1.9–2.7)
MCH RBC QN AUTO: 28.4 PG (ref 26.8–34.3)
MCHC RBC AUTO-ENTMCNC: 30.1 G/DL (ref 31.4–37.4)
MCV RBC AUTO: 94 FL (ref 82–98)
PHOSPHATE SERPL-MCNC: 4.4 MG/DL (ref 2.3–4.1)
PLATELET # BLD AUTO: 202 THOUSANDS/UL (ref 149–390)
PMV BLD AUTO: 9.7 FL (ref 8.9–12.7)
POTASSIUM SERPL-SCNC: 5.5 MMOL/L (ref 3.5–5.3)
RBC # BLD AUTO: 4.02 MILLION/UL (ref 3.88–5.62)
SODIUM SERPL-SCNC: 132 MMOL/L (ref 135–147)
TSH SERPL DL<=0.05 MIU/L-ACNC: 2.46 UIU/ML (ref 0.45–4.5)
WBC # BLD AUTO: 6.85 THOUSAND/UL (ref 4.31–10.16)

## 2025-07-09 PROCEDURE — 84443 ASSAY THYROID STIM HORMONE: CPT

## 2025-07-09 PROCEDURE — G2212 PROLONG OUTPT/OFFICE VIS: HCPCS | Performed by: PSYCHIATRY & NEUROLOGY

## 2025-07-09 PROCEDURE — 5A1D70Z PERFORMANCE OF URINARY FILTRATION, INTERMITTENT, LESS THAN 6 HOURS PER DAY: ICD-10-PCS | Performed by: INTERNAL MEDICINE

## 2025-07-09 PROCEDURE — 84100 ASSAY OF PHOSPHORUS: CPT | Performed by: NURSE PRACTITIONER

## 2025-07-09 PROCEDURE — 83735 ASSAY OF MAGNESIUM: CPT | Performed by: NURSE PRACTITIONER

## 2025-07-09 PROCEDURE — 85027 COMPLETE CBC AUTOMATED: CPT | Performed by: NURSE PRACTITIONER

## 2025-07-09 PROCEDURE — 99215 OFFICE O/P EST HI 40 MIN: CPT | Performed by: INTERNAL MEDICINE

## 2025-07-09 PROCEDURE — 80048 BASIC METABOLIC PNL TOTAL CA: CPT | Performed by: NURSE PRACTITIONER

## 2025-07-09 PROCEDURE — 99215 OFFICE O/P EST HI 40 MIN: CPT | Performed by: PSYCHIATRY & NEUROLOGY

## 2025-07-09 PROCEDURE — 99232 SBSQ HOSP IP/OBS MODERATE 35: CPT

## 2025-07-09 RX ORDER — LORAZEPAM 2 MG/ML
1 INJECTION INTRAMUSCULAR ONCE
Status: COMPLETED | OUTPATIENT
Start: 2025-07-09 | End: 2025-07-09

## 2025-07-09 RX ORDER — HYDRALAZINE HYDROCHLORIDE 20 MG/ML
5 INJECTION INTRAMUSCULAR; INTRAVENOUS EVERY 6 HOURS PRN
Status: DISCONTINUED | OUTPATIENT
Start: 2025-07-09 | End: 2025-07-12 | Stop reason: HOSPADM

## 2025-07-09 RX ADMIN — HYDRALAZINE HYDROCHLORIDE 5 MG: 20 INJECTION INTRAMUSCULAR; INTRAVENOUS at 16:04

## 2025-07-09 RX ADMIN — FUROSEMIDE 20 MG: 20 TABLET ORAL at 12:14

## 2025-07-09 RX ADMIN — Medication 6 MG: at 21:55

## 2025-07-09 RX ADMIN — HEPARIN SODIUM 5000 UNITS: 5000 INJECTION INTRAVENOUS; SUBCUTANEOUS at 07:11

## 2025-07-09 RX ADMIN — Medication 2.5 MG: at 12:35

## 2025-07-09 RX ADMIN — METOPROLOL SUCCINATE 25 MG: 25 TABLET, EXTENDED RELEASE ORAL at 12:14

## 2025-07-09 RX ADMIN — METOPROLOL SUCCINATE 25 MG: 25 TABLET, EXTENDED RELEASE ORAL at 21:55

## 2025-07-09 RX ADMIN — Medication 2.5 MG: at 21:55

## 2025-07-09 RX ADMIN — HEPARIN SODIUM 5000 UNITS: 5000 INJECTION INTRAVENOUS; SUBCUTANEOUS at 13:00

## 2025-07-09 RX ADMIN — METHOCARBAMOL 500 MG: 500 TABLET ORAL at 17:05

## 2025-07-09 RX ADMIN — ALLOPURINOL 100 MG: 100 TABLET ORAL at 12:13

## 2025-07-09 RX ADMIN — NIFEDIPINE 30 MG: 30 TABLET, EXTENDED RELEASE ORAL at 17:06

## 2025-07-09 RX ADMIN — FAMOTIDINE 20 MG: 20 TABLET, FILM COATED ORAL at 21:55

## 2025-07-09 RX ADMIN — GABAPENTIN 100 MG: 100 CAPSULE ORAL at 12:15

## 2025-07-09 RX ADMIN — SENNOSIDES 8.6 MG: 8.6 TABLET, FILM COATED ORAL at 12:14

## 2025-07-09 RX ADMIN — ATORVASTATIN CALCIUM 80 MG: 80 TABLET, FILM COATED ORAL at 16:12

## 2025-07-09 RX ADMIN — FUROSEMIDE 20 MG: 20 TABLET ORAL at 17:06

## 2025-07-09 RX ADMIN — LIDOCAINE 1 PATCH: 50 PATCH CUTANEOUS at 09:05

## 2025-07-09 RX ADMIN — LORAZEPAM 1 MG: 2 INJECTION INTRAMUSCULAR; INTRAVENOUS at 20:31

## 2025-07-09 RX ADMIN — ASPIRIN 81 MG: 81 TABLET, CHEWABLE ORAL at 12:14

## 2025-07-09 RX ADMIN — HEPARIN SODIUM 5000 UNITS: 5000 INJECTION INTRAVENOUS; SUBCUTANEOUS at 21:56

## 2025-07-09 NOTE — PROGRESS NOTES
Progress Note - Hospitalist   Name: Naresh Carpio 65 y.o. male I MRN: [id number]  Unit/Bed#: [address] [place], [place] [address] I Date of Admission: 7/8/2025   Date of Service: 7/9/2025 I Hospital Day: 0    Assessment & Plan  AMS (altered mental status)  Patient sent in from dialysis center for altered mental status and agitation.  CT head: No acute intracranial abnormality. Chronic microangiopathic changes.   Initial concern for uremia given patient has shortened or missed dialysis sessions past two weeks, however, per nephrology less likely uremia alone contributing  UA unrevealing, glucose stable  Follow up TSH, B12, ammonia  No signs/symptoms of infection  Requiring restraints due to agitation  Neurology consulted  MRI brain ordered  ESRD (end stage renal disease) ([facility])  Lab Results   Component Value Date    EGFR 7 07/09/2025    EGFR 5 07/08/2025    EGFR 13 06/13/2025    CREATININE 6.98 (H) 07/09/2025    CREATININE 8.89 (H) 07/08/2025    CREATININE 4.29 (H) 06/13/2025   For past two weeks patient has been shortening dialysis sessions  TTS schedule  Nephrology consulted for dialysis management  Anemia  Anemia chronic suspect secondary to ESRD  Hemoglobin stable  CBC in a.m.  Electrolyte imbalance risk  Potassium currently 5.7  Receiving dialysis this afternoon  Repeat labs in a.m.  CHF (congestive heart failure) ([facility])  Wt Readings from Last 3 Encounters:   07/08/25 97 kg (213 lb 13.5 oz)   06/07/25 89.7 kg (197 lb 12 oz)   05/13/25 92.6 kg (204 lb 3.2 oz)     Hx G1DD with previous EF 60-65%  BNP 3081, noncompliant with dialysis sessions recently  Dialysis with ultrafiltration per nephrology  At risk for acid-base imbalance  Currently CO2 28, anion 9, chloride 94  Receiving emergent dialysis this afternoon.  Nephrology following  HTN (hypertension)  Continue home nifedipine 30 mg daily, Toprol XL 25 mg BID, lasix 20 mg bid  Continue to monitor  Secondary hyperparathyroidism (HCC)  On sevelamer  Paroxysmal atrial fibrillation  "(HCC)  Continue Toprol XL 25 mg BID  Previously on Eliquis, per chart review patient refusing to take Eliquis due to history of hematoma in April 2025    VTE Pharmacologic Prophylaxis:   Moderate Risk (Score 3-4) - Pharmacological DVT Prophylaxis Ordered: heparin.    Mobility:   Basic Mobility Inpatient Raw Score: 14  JH-HLM Goal: 4: Move to chair/commode  JH-HLM Achieved: 2: Bed activities/Dependent transfer  JH-HLM Goal NOT achieved. Continue with multidisciplinary rounding and encourage appropriate mobility to improve upon JH-HLM goals.    Patient Centered Rounds: I performed bedside rounds with nursing staff today.   Discussions with Specialists or Other Care Team Provider: nephrology, neurology, rn    Education and Discussions with Family / Patient: Attempted to update  (daughter) via phone. Left voicemail.     Current Length of Stay: 0 day(s)  Current Patient Status: Observation   Certification Statement: The patient, admitted on an observation basis, will now require > 2 midnight hospital stay due to altered mental status workup, agitation, additional dialysis sessions, MRI brain  Discharge Plan: Anticipate discharge in 48-72 hrs to discharge location to be determined pending rehab evaluations.    Code Status: Level 1 - Full Code    Subjective   Patient found laying sideways in bed this morning. He appeared agitated, repeatedly yelling to hand him peanut butter that was on side table. When handed peanut butter, he stated \"get that away from me!.\" He is able to state name and that we are in the hospital. When asked the year he said \"don't get started with  me.\" When asked if he is in pain he states \"yes the hospital.\" Mood is labile, he is intermittently tearful, yelling, swearing. Will not answer questions.    Objective :  Temp:  [96.6 °F (35.9 °C)-99.5 °F (37.5 °C)] 98.5 °F (36.9 °C)  HR:  [56-99] 71  BP: (120-189)/(65-98) 177/90  Resp:  [14-18] 18  SpO2:  [92 %-96 %] 93 %  O2 Device: None " (Room air)    Body mass index is 28.21 kg/m².     Input and Output Summary (last 24 hours):     Intake/Output Summary (Last 24 hours) at 7/9/2025 1329  Last data filed at 7/8/2025 1743  Gross per 24 hour   Intake 500 ml   Output 2750 ml   Net -2250 ml       Physical Exam  Vitals and nursing note reviewed.   Constitutional:       General: He is not in acute distress.     Appearance: He is well-developed.     Cardiovascular:      Rate and Rhythm: Normal rate and regular rhythm.   Pulmonary:      Effort: Pulmonary effort is normal. No respiratory distress.      Breath sounds: Normal breath sounds.   Abdominal:      Palpations: Abdomen is soft.      Tenderness: There is no abdominal tenderness.     Musculoskeletal:         General: No swelling.     Skin:     General: Skin is warm and dry.      Capillary Refill: Capillary refill takes less than 2 seconds.     Neurological:      Mental Status: He is alert.     Psychiatric:         Mood and Affect: Mood normal. Affect is labile, angry and tearful.         Behavior: Behavior is uncooperative and agitated.       Lines/Drains:  Lines/Drains/Airways       Active Status       Name Placement date Placement time Site Days    HD Permanent Double Catheter 08/30/23  --  Internal jugular  679                      Telemetry:  Telemetry Orders (From admission, onward)               24 Hour Telemetry Monitoring  Continuous x 24 Hours (Telem)        Expiring   Question:  Reason for 24 Hour Telemetry  Answer:  Metabolic/electrolyte disturbance with high probability of dysrhythmia. K level <3 or >6 OR KCL infusion >10mEq/hr                     Telemetry Reviewed: just placed on telemetry  Indication for Continued Telemetry Use: Metabolic/electrolyte disturbance with high probability of dysrhythmia               Lab Results: I have reviewed the following results:   Results from last 7 days   Lab Units 07/09/25  0707 07/08/25  1144   WBC Thousand/uL 6.85 5.83   HEMOGLOBIN g/dL 11.4* 10.3*    HEMATOCRIT % 37.9 34.5*   PLATELETS Thousands/uL 202 198   SEGS PCT %  --  58   LYMPHO PCT %  --  23   MONO PCT %  --  14*   EOS PCT %  --  4     Results from last 7 days   Lab Units 07/09/25  0707 07/08/25  1144   SODIUM mmol/L 132* 131*   POTASSIUM mmol/L 5.5* 5.7*   CHLORIDE mmol/L 96 94*   CO2 mmol/L 25 28   BUN mg/dL 35* 54*   CREATININE mg/dL 6.98* 8.89*   ANION GAP mmol/L 11 9   CALCIUM mg/dL 8.7 9.1   ALBUMIN g/dL  --  3.6   TOTAL BILIRUBIN mg/dL  --  0.58   ALK PHOS U/L  --  78   ALT U/L  --  5*   AST U/L  --  8*   GLUCOSE RANDOM mg/dL 71 86     Results from last 7 days   Lab Units 07/08/25  1144   INR  1.09             Results from last 7 days   Lab Units 07/08/25  1144   PROCALCITONIN ng/ml 0.25       Recent Cultures (last 7 days):         Imaging Results Review: I reviewed radiology reports from this admission including: chest xray and CT head.  Other Study Results Review: No additional pertinent studies reviewed.    Last 24 Hours Medication List:     Current Facility-Administered Medications:     acetaminophen (TYLENOL) tablet 650 mg, Q6H PRN    albuterol inhalation solution 2.5 mg, Q6H PRN    allopurinol (ZYLOPRIM) tablet 100 mg, Daily    aspirin chewable tablet 81 mg, Daily    atorvastatin (LIPITOR) tablet 80 mg, Daily With Dinner    famotidine (PEPCID) tablet 20 mg, HS    furosemide (LASIX) tablet 20 mg, BID    gabapentin (NEURONTIN) capsule 100 mg, Once per day on Monday Wednesday Friday    heparin (porcine) subcutaneous injection 5,000 Units, Q8H KEMAL    lidocaine (LIDODERM) 5 % patch 1 patch, Daily    melatonin tablet 6 mg, HS    methocarbamol (ROBAXIN) tablet 500 mg, TID PRN    metoprolol succinate (TOPROL-XL) 24 hr tablet 25 mg, Q12H KEMAL    NIFEdipine (PROCARDIA XL) 24 hr tablet 30 mg, After Dinner    oxyCODONE (ROXICODONE) split tablet 2.5 mg, Q8H PRN    senna (SENOKOT) tablet 8.6 mg, Daily With Lunch    sevelamer (RENAGEL) tablet 1,600 mg, TID With Meals    Administrative Statements   Today,  Patient Was Seen By: Nallely Pisano PA-C    **Please Note: This note may have been constructed using a voice recognition system.**

## 2025-07-09 NOTE — PLAN OF CARE
Problem: Potential for Falls  Goal: Patient will remain free of falls  Description: INTERVENTIONS:  - Educate patient/family on patient safety including physical limitations  - Instruct patient to call for assistance with activity   - Consider consulting OT/PT to assist with strengthening/mobility based on AM PAC & JH-HLM score  - Consult OT/PT to assist with strengthening/mobility   - Keep Call bell within reach  - Keep bed low and locked with side rails adjusted as appropriate  - Keep care items and personal belongings within reach  - Initiate and maintain comfort rounds  - Make Fall Risk Sign visible to staff  - Offer Toileting every 2 Hours, in advance of need  - Initiate/Maintain bed/chair alarm  - Obtain necessary fall risk management equipment: bed/chair alarm  - Apply yellow socks and bracelet for high fall risk patients  - Consider moving patient to room near nurses station  Outcome: Progressing     Problem: PAIN - ADULT  Goal: Verbalizes/displays adequate comfort level or baseline comfort level  Description: Interventions:  - Encourage patient to monitor pain and request assistance  - Assess pain using appropriate pain scale  - Administer analgesics as ordered based on type and severity of pain and evaluate response  - Implement non-pharmacological measures as appropriate and evaluate response  - Consider cultural and social influences on pain and pain management  - Notify physician/advanced practitioner if interventions unsuccessful or patient reports new pain  - Educate patient/family on pain management process including their role and importance of  reporting pain   - Provide non-pharmacologic/complimentary pain relief interventions  Outcome: Progressing     Problem: INFECTION - ADULT  Goal: Absence or prevention of progression during hospitalization  Description: INTERVENTIONS:  - Assess and monitor for signs and symptoms of infection  - Monitor lab/diagnostic results  - Monitor all insertion sites,  i.e. indwelling lines, tubes, and drains  - Monitor endotracheal if appropriate and nasal secretions for changes in amount and color  - Wiggins appropriate cooling/warming therapies per order  - Administer medications as ordered  - Instruct and encourage patient and family to use good hand hygiene technique  - Identify and instruct in appropriate isolation precautions for identified infection/condition  Outcome: Progressing     Problem: SAFETY ADULT  Goal: Patient will remain free of falls  Description: INTERVENTIONS:  - Educate patient/family on patient safety including physical limitations  - Instruct patient to call for assistance with activity   - Consider consulting OT/PT to assist with strengthening/mobility based on AM PAC & -HL score  - Consult OT/PT to assist with strengthening/mobility   - Keep Call bell within reach  - Keep bed low and locked with side rails adjusted as appropriate  - Keep care items and personal belongings within reach  - Initiate and maintain comfort rounds  - Make Fall Risk Sign visible to staff  - Offer Toileting every 2 Hours, in advance of need  - Initiate/Maintain bed/chair alarm  - Obtain necessary fall risk management equipment: bed/chair alarm  - Apply yellow socks and bracelet for high fall risk patients  - Consider moving patient to room near nurses station  Outcome: Progressing  Goal: Maintain or return to baseline ADL function  Description: INTERVENTIONS:  -  Assess patient's ability to carry out ADLs; assess patient's baseline for ADL function and identify physical deficits which impact ability to perform ADLs (bathing, care of mouth/teeth, toileting, grooming, dressing, etc.)  - Assess/evaluate cause of self-care deficits   - Assess range of motion  - Assess patient's mobility; develop plan if impaired  - Assess patient's need for assistive devices and provide as appropriate  - Encourage maximum independence but intervene and supervise when necessary  - Involve family  in performance of ADLs  - Assess for home care needs following discharge   - Consider OT consult to assist with ADL evaluation and planning for discharge  - Provide patient education as appropriate  - Monitor functional capacity and physical performance, use of AM PAC & JH-HLM   - Monitor gait, balance and fatigue with ambulation    Outcome: Progressing  Goal: Maintains/Returns to pre admission functional level  Description: INTERVENTIONS:  - Perform AM-PAC 6 Click Basic Mobility/ Daily Activity assessment daily.  - Set and communicate daily mobility goal to care team and patient/family/caregiver.   - Collaborate with rehabilitation services on mobility goals if consulted  - Perform Range of Motion 3 times a day.  - Reposition patient every 3 hours.  - Dangle patient 3 times a day  - Stand patient 3 times a day  - Ambulate patient 3 times a day  - Out of bed to chair 3 times a day   - Out of bed for meals 3 times a day  - Out of bed for toileting  - Record patient progress and toleration of activity level   Outcome: Progressing     Problem: DISCHARGE PLANNING  Goal: Discharge to home or other facility with appropriate resources  Description: INTERVENTIONS:  - Identify barriers to discharge w/patient and caregiver  - Arrange for needed discharge resources and transportation as appropriate  - Identify discharge learning needs (meds, wound care, etc.)  - Arrange for interpretive services to assist at discharge as needed  - Refer to Case Management Department for coordinating discharge planning if the patient needs post-hospital services based on physician/advanced practitioner order or complex needs related to functional status, cognitive ability, or social support system  Outcome: Progressing     Problem: Knowledge Deficit  Goal: Patient/family/caregiver demonstrates understanding of disease process, treatment plan, medications, and discharge instructions  Description: Complete learning assessment and assess knowledge  base.  Interventions:  - Provide teaching at level of understanding  - Provide teaching via preferred learning methods  Outcome: Progressing     Problem: METABOLIC, FLUID AND ELECTROLYTES - ADULT  Goal: Electrolytes maintained within normal limits  Description: INTERVENTIONS:  - Monitor labs and assess patient for signs and symptoms of electrolyte imbalances  - Administer electrolyte replacement as ordered  - Monitor response to electrolyte replacements, including repeat lab results as appropriate  - Instruct patient on fluid and nutrition as appropriate  Outcome: Progressing  Goal: Fluid balance maintained  Description: INTERVENTIONS:  - Monitor labs   - Monitor I/O and WT  - Instruct patient on fluid and nutrition as appropriate  - Assess for signs & symptoms of volume excess or deficit  Outcome: Progressing     Problem: Prexisting or High Potential for Compromised Skin Integrity  Goal: Skin integrity is maintained or improved  Description: INTERVENTIONS:  - Identify patients at risk for skin breakdown  - Assess and monitor skin integrity including under and around medical devices   - Assess and monitor nutrition and hydration status  - Monitor labs  - Assess for incontinence   - Turn and reposition patient  - Assist with mobility/ambulation  - Relieve pressure over chente prominences   - Avoid friction and shearing  - Provide appropriate hygiene as needed including keeping skin clean and dry  - Evaluate need for skin moisturizer/barrier cream  - Collaborate with interdisciplinary team  - Patient/family teaching  - Consider wound care consult    Assess:  - Review Praful scale daily  - Clean and moisturize skin every shift  - Inspect skin when repositioning, toileting, and assisting with ADLS  - Assess under medical devices such as nc every shift  - Assess extremities for adequate circulation and sensation     Bed Management:  - Have minimal linens on bed & keep smooth, unwrinkled  - Change linens as needed when  moist or perspiring  - Avoid sitting or lying in one position for more than 2 hours while in bed?Keep HOB at 45 degrees   - Toileting:  - Offer bedside commode  - Assess for incontinence every 2hrs  - Use incontinent care products after each incontinent episode such as protective barriers    Activity:  - Mobilize patient 3 times a day  - Encourage activity and walks on unit  - Encourage or provide ROM exercises   - Turn and reposition patient every 2 Hours  - Use appropriate equipment to lift or move patient in bed  - Instruct/ Assist with weight shifting every shift when out of bed in chair  - Consider limitation of chair time 2 hour intervals    Skin Care:  - Avoid use of baby powder, tape, friction and shearing, hot water or constrictive clothing  - Relieve pressure over bony prominences using wedges  - Do not massage red bony areas    Next Steps:  - Teach patient strategies to minimize risks such as pressure injury  - Consider consults to  interdisciplinary teams such as   Problem: SAFETY,RESTRAINT: NV/NON-SELF DESTRUCTIVE BEHAVIOR  Goal: Remains free of harm/injury (restraint for non violent/non self-detsructive behavior)  Description: INTERVENTIONS:  - Instruct patient/family regarding restraint use   - Assess and monitor physiologic and psychological status   - Provide interventions and comfort measures to meet assessed patient needs   - Identify and implement measures to help patient regain control  - Assess readiness for release of restraint   Outcome: Progressing     Problem: SAFETY,RESTRAINT: NV/NON-SELF DESTRUCTIVE BEHAVIOR  Goal: Returns to optimal restraint-free functioning  Description: INTERVENTIONS:  - Assess the patient's behavior and symptoms that indicate continued need for restraint  - Identify and implement measures to help patient regain control  - Assess readiness for release of restraint   Outcome: Progressing     Outcome: Progressing     Problem: RESPIRATORY - ADULT  Goal: Achieves optimal  ventilation and oxygenation  Description: INTERVENTIONS:  - Assess for changes in respiratory status  - Assess for changes in mentation and behavior  - Position to facilitate oxygenation and minimize respiratory effort  - Oxygen administered by appropriate delivery if ordered  - Initiate smoking cessation education as indicated  - Encourage broncho-pulmonary hygiene including cough, deep breathe, Incentive Spirometry  - Assess the need for suctioning and aspirate as needed  - Assess and instruct to report SOB or any respiratory difficulty  - Respiratory Therapy support as indicated  Outcome: Progressing

## 2025-07-09 NOTE — ASSESSMENT & PLAN NOTE
Wt Readings from Last 3 Encounters:   07/08/25 97 kg (213 lb 13.5 oz)   06/07/25 89.7 kg (197 lb 12 oz)   05/13/25 92.6 kg (204 lb 3.2 oz)     For medical and nephrology management

## 2025-07-09 NOTE — ASSESSMENT & PLAN NOTE
Patient is a 65-year-old male from [facility] with PMH of ESRD on dialysis, A-fib, anemia, HLD, HTN, DM, TIA and TME postdialysis who presents to the ER with altered mental status, generalized complaints of weakness and pain and missed/incomplete dialysis sessions.  Patient reported he is decreasing his dialysis times as it causes him increased neuropathy type pains.  He is unable to tolerate it and generally touches surfaces short.  Over course of 2 weeks, he has had decrease in time frames, most recently was Saturday lasting approximately 20 to 30 minutes.  On arrival he was hypertensive with electrolyte abnormalities.  High creatinine and BUN were also above his baseline.  Patient received dialysis last evening, went at a slower rate for his tolerance.  This a.m. he continued to be agitated, hollering out and altered therefore recommendations for neurology consultation regarding possible central cause.  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  Today patient is somewhat lethargic, he is able to open his eyes with notable right eye ptosis.  Reviewed patient's history he does have some mild ptosis noted to the right eye on his photos, somewhat exaggerated today.  He is able to open his eyes and has EOMI however is very intolerant to light and intolerant to testing.  Patient is moving all of his extremities, has adequate strength but his exam is limited due to his willingness to fully participate.  Patient does doze back off intermittently throughout his exam.  On exam patient does have some noticeable weakness in the left upper and lower extremities compared to the right although formal effort is not been performed.  Suspect toxic metabolic encephalopathy related to chronic renal failure without effective dialysis therapies.    Monitored on telemetry  Neurological assessments  Continue baby aspirin 81 mg  daily  Continue atorvastatin 80 mg daily  Due to left-sided upper and lower extremities will get MRI of the brain.  Use delirium precautions, maintain sleep cycle  Avoid mentation altering medication options.

## 2025-07-09 NOTE — CASE MANAGEMENT
Case Management Assessment & Discharge Planning Note    Patient name Naresh Carpio  Location [facility] [place] [facility] [place] [facility]-* MRN [id number]  : 1960 Date 2025       Current Admission Date: 2025  Current Admission Diagnosis:AMS (altered mental status)   Patient Active Problem List    Diagnosis Date Noted    HTN (hypertension) 2025    CHF (congestive heart failure) (HCC) 2025    Anemia 2025    Agitation 2025    Abnormal urinalysis 2025    Metabolic encephalopathy 2025    Proliferative diabetic retinopathy of both eyes with macular edema associated with type 2 diabetes mellitus (HCC) 2025    COVID-19 2025    Bilateral leg edema 2025    Ambulatory dysfunction 2025    Retinopathy 2025    At risk for acid-base imbalance 2025    Electrolyte imbalance risk 2025    Hallucinations 2025    Type 2 diabetes mellitus with foot ulcer, without long-term current use of insulin ([facility]) 2025    Insomnia 2025    Hyperphosphatemia 2025    Wound of skin 2025    At high risk for skin breakdown 2025    At risk for venous thromboembolism (VTE) 2025    Impaired mobility and activities of daily living 2025    Visual disturbance 2025    Pseudoaneurysm (HCC) 2025    Acute pain due to trauma 2025    ESRD on dialysis ([facility]) 2025    Wounds, multiple 2025    Traumatic retroperitoneal hemorrhage 2025    Secondary hyperparathyroidism ([facility]) 2025    At risk for electrolyte imbalance 2025    Multiple open wounds of foot 2025    Disorder of acid-base balance 2025    L2 vertebral fracture (HCC) 2025    Non-compliance with treatment 2025    Generalized weakness 2025    Gout 2025    Closed fracture of proximal end of left humerus with routine healing 02/10/2025    Left shoulder pain 2025    ESRD (end stage renal disease) on dialysis  ([facility]) 01/16/2025    Primary hypertension 01/16/2025    Anemia in ESRD (end-stage renal disease)  ([facility]) 01/16/2025    Chronic kidney disease-mineral and bone disorder 01/16/2025    Renal lesion 12/19/2024    Chronic wound 11/20/2024    Acute respiratory failure with hypoxia ([facility]) 11/19/2024    Volume overload secondary to noncompliance with hemodialysis 11/19/2024    Pleural effusion 11/19/2024    Elevated troponin 11/19/2024    Paroxysmal atrial fibrillation ([facility])     ESRD (end stage renal disease) ([facility]) 10/25/2024    Hyponatremia 10/19/2024    Acute on chronic anemia 10/14/2024    PAD (peripheral artery disease) ([facility]) 10/08/2024    Hyperkalemia 04/06/2024    Difficulty with speech 03/19/2024    History of amputation of hallux ([facility]) 03/18/2024    At risk for constipation 09/06/2023    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 mellitus, with necrosis of bone ([facility])     Acquired deformity of foot, right     Charcot's joint     Open wound of right foot 08/21/2023    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 mellitus, with bone involvement without evidence of necrosis ([facility])     Diabetic ulcer of left midfoot associated with type 2 diabetes mellitus, limited to breakdown of skin ([facility])     Diabetic polyneuropathy associated with type 2 diabetes mellitus ([facility])     History of amputation of lesser toe of left foot ([facility])     Onychomycosis     AMS (altered mental status) 08/20/2023    Traumatic retroperitoneal hematoma 08/19/2023    Osteoarthritis of left hip 07/27/2022    Severe Left Orchalgia 07/26/2022    Right shoulder pain 07/26/2022    SIRS (systemic inflammatory response syndrome) ([facility]) 07/26/2022    Left hip pain 07/06/2022    Hemodialysis status ([facility])     Hypervolemia     Chronic anemia 01/21/2022    History of prediabetes     Essential hypertension     History of TIA (transient ischemic attack)     T10 vertebral fracture ([facility])       LOS (days): 0  Geometric Mean LOS (GMLOS)  (days):   Days to GMLOS:     OBJECTIVE:      Current admission status: Observation     [facility]   [id number] - [place], [place] - [address]  [address]  [facility] [address]  Phone: [phone] Fax: [phone]    [id number] - [place], [place] - [address]  [address]  [facility] [address]  Phone: [phone] Fax: [phone]    Primary Care Provider: Jeffrey Jackson    Primary Insurance: MEDICARE  Secondary Insurance:     ASSESSMENT:  Active Health Care Proxies       Alicja Carpio Health Care Representative - Sister   Primary Phone: [phone] (Mobile)            Obs Notice Signed: 07/09/25 (Attempted to review FOREMAN via phone with sister. Unable to make contact. Copy mailed and placed in scan bin.)    Readmission Root Cause  30 Day Readmission: Yes  During your hospital stay, did someone (provider, nurse, ) explain your care to you in a way you could understand?: Yes  Did you feel medically stable to leave the hospital?: Yes  Were you able to pay for your medication at the pharmacy?: Yes  Did you have reliable transportation to take you to your appointments?: Yes  During previous admission, was a post-acute recommendation made?: Yes  What post-acute resources were offered?: STR  Patient was readmitted due to: Altered Mental Status  Action Plan: Medical management    Patient Information  Admitted from:: Facility ([facility])  Mental Status: Confused  Primary Caregiver: Other (Comment)  Caregiver's Name:: [facility]  Caregiver's Relationship to Patient:: Facility Staff  Caregiver's Telephone Number:: [phone]  Support Systems: Family members  County of Residence: [place]  Type of Current Residence: Facility  Living Arrangements: Lives in Facility    Activities of Daily Living Prior to Admission  Functional Status: Assistance  Completes ADLs independently?: No  Level of ADL dependence:  Assistance  Ambulates independently?: Yes  Does patient use assisted devices?: Yes  Assisted Devices (DME) used: Walker, Other (Comment) (TLSO brace)  Does the patient have a history of Short-Term Rehab?: Yes ([facility], [facility])  Does patient currently have HHC?: No     Patient Information Continued  Income Source: SSI/SSD  Does patient receive dialysis treatments?: Yes ([facility]; TTS; 1000)     Means of Transportation  Means of Transport to Appts:: Other (Comment) (Family was transporting to HD prior to SNF; SNF currently transports to HD)    DISCHARGE DETAILS:    Discharge planning discussed with:: Sister Alicja     CM contacted family/caregiver?: Yes (Voicemail left for sister Alicja requesting callback to discuss DCP.)    Contacts  Patient Contacts: Alicja Carpio (sister)  Relationship to Patient:: Family  Contact Method: Phone  Phone Number: [phone]  Reason/Outcome: Discharge Planning, Emergency Contact, Continuity of Care    Other Referral/Resources/Interventions Provided:  Interventions: Short Term Rehab  Referral Comments: DANIKA referral sent via [facility] to [facility].     Treatment Team Recommendation: Facility Return, SNF, Short Term Rehab  Expected Discharge Disposition: Skilled Nursing Facility

## 2025-07-09 NOTE — ASSESSMENT & PLAN NOTE
Etiology unclear  - CT head  unrevealing for acute issue  - Urinalysis unrevealing  - There may be a uremic component as the patient has had poor dialysis over the past at least 2 weeks

## 2025-07-09 NOTE — ASSESSMENT & PLAN NOTE
-Prior was on nifedipine 30 mg daily and furosemide 20 mg twice daily which was adjusted in June 2025 to metoprolol 25 mg twice daily, nifedipine 30 mg daily  - To note, patient was noted to have bradycardia as outpatient?  Will need to confirm if patient is on beta-blocker.  Currently heart rate is okay

## 2025-07-09 NOTE — ASSESSMENT & PLAN NOTE
-Potassium 5.5.  Likely in the setting of poor compliance with dialysis and plan for extra treatment 7/9 for short treatment

## 2025-07-09 NOTE — ASSESSMENT & PLAN NOTE
Continue Toprol XL 25 mg BID  Previously on Eliquis, per chart review patient refusing to take Eliquis due to history of hematoma in April 2025

## 2025-07-09 NOTE — PLAN OF CARE
Problem: Potential for Falls  Goal: Patient will remain free of falls  Description: INTERVENTIONS:  - Educate patient/family on patient safety including physical limitations  - Instruct patient to call for assistance with activity   - Consider consulting OT/PT to assist with strengthening/mobility based on AM PAC & JH-HLM score  - Consult OT/PT to assist with strengthening/mobility   - Keep Call bell within reach  - Keep bed low and locked with side rails adjusted as appropriate  - Keep care items and personal belongings within reach  - Initiate and maintain comfort rounds  - Make Fall Risk Sign visible to staff  - Offer Toileting every 2 Hours, in advance of need  - Initiate/Maintain bed/chair alarm  - Obtain necessary fall risk management equipment: bed/chair alarm  - Apply yellow socks and bracelet for high fall risk patients  - Consider moving patient to room near nurses station  Outcome: Progressing     Problem: PAIN - ADULT  Goal: Verbalizes/displays adequate comfort level or baseline comfort level  Description: Interventions:  - Encourage patient to monitor pain and request assistance  - Assess pain using appropriate pain scale  - Administer analgesics as ordered based on type and severity of pain and evaluate response  - Implement non-pharmacological measures as appropriate and evaluate response  - Consider cultural and social influences on pain and pain management  - Notify physician/advanced practitioner if interventions unsuccessful or patient reports new pain  - Educate patient/family on pain management process including their role and importance of  reporting pain   - Provide non-pharmacologic/complimentary pain relief interventions  Outcome: Progressing     Problem: INFECTION - ADULT  Goal: Absence or prevention of progression during hospitalization  Description: INTERVENTIONS:  - Assess and monitor for signs and symptoms of infection  - Monitor lab/diagnostic results  - Monitor all insertion sites,  i.e. indwelling lines, tubes, and drains  - Monitor endotracheal if appropriate and nasal secretions for changes in amount and color  - [place] appropriate cooling/warming therapies per order  - Administer medications as ordered  - Instruct and encourage patient and family to use good hand hygiene technique  - Identify and instruct in appropriate isolation precautions for identified infection/condition  Outcome: Progressing     Problem: SAFETY ADULT  Goal: Patient will remain free of falls  Description: INTERVENTIONS:  - Educate patient/family on patient safety including physical limitations  - Instruct patient to call for assistance with activity   - Consider consulting OT/PT to assist with strengthening/mobility based on AM PAC & -HL score  - Consult OT/PT to assist with strengthening/mobility   - Keep Call bell within reach  - Keep bed low and locked with side rails adjusted as appropriate  - Keep care items and personal belongings within reach  - Initiate and maintain comfort rounds  - Make Fall Risk Sign visible to staff  - Offer Toileting every 2 Hours, in advance of need  - Initiate/Maintain bed/chair alarm  - Obtain necessary fall risk management equipment: bed/chair alarm  - Apply yellow socks and bracelet for high fall risk patients  - Consider moving patient to room near nurses station  Outcome: Progressing  Goal: Maintain or return to baseline ADL function  Description: INTERVENTIONS:  -  Assess patient's ability to carry out ADLs; assess patient's baseline for ADL function and identify physical deficits which impact ability to perform ADLs (bathing, care of mouth/teeth, toileting, grooming, dressing, etc.)  - Assess/evaluate cause of self-care deficits   - Assess range of motion  - Assess patient's mobility; develop plan if impaired  - Assess patient's need for assistive devices and provide as appropriate  - Encourage maximum independence but intervene and supervise when necessary  - Involve family  in performance of ADLs  - Assess for home care needs following discharge   - Consider OT consult to assist with ADL evaluation and planning for discharge  - Provide patient education as appropriate  - Monitor functional capacity and physical performance, use of AM PAC & JH-HLM   - Monitor gait, balance and fatigue with ambulation    Outcome: Progressing  Goal: Maintains/Returns to pre admission functional level  Description: INTERVENTIONS:  - Perform AM-PAC 6 Click Basic Mobility/ Daily Activity assessment daily.  - Set and communicate daily mobility goal to care team and patient/family/caregiver.   - Collaborate with rehabilitation services on mobility goals if consulted  - Perform Range of Motion 3 times a day.  - Reposition patient every 3 hours.  - Dangle patient 3 times a day  - Stand patient 3 times a day  - Ambulate patient 3 times a day  - Out of bed to chair 3 times a day   - Out of bed for meals 3 times a day  - Out of bed for toileting  - Record patient progress and toleration of activity level   Outcome: Progressing     Problem: DISCHARGE PLANNING  Goal: Discharge to home or other facility with appropriate resources  Description: INTERVENTIONS:  - Identify barriers to discharge w/patient and caregiver  - Arrange for needed discharge resources and transportation as appropriate  - Identify discharge learning needs (meds, wound care, etc.)  - Arrange for interpretive services to assist at discharge as needed  - Refer to Case Management Department for coordinating discharge planning if the patient needs post-hospital services based on physician/advanced practitioner order or complex needs related to functional status, cognitive ability, or social support system  Outcome: Progressing     Problem: Knowledge Deficit  Goal: Patient/family/caregiver demonstrates understanding of disease process, treatment plan, medications, and discharge instructions  Description: Complete learning assessment and assess knowledge  base.  Interventions:  - Provide teaching at level of understanding  - Provide teaching via preferred learning methods  Outcome: Progressing     Problem: METABOLIC, FLUID AND ELECTROLYTES - ADULT  Goal: Electrolytes maintained within normal limits  Description: INTERVENTIONS:  - Monitor labs and assess patient for signs and symptoms of electrolyte imbalances  - Administer electrolyte replacement as ordered  - Monitor response to electrolyte replacements, including repeat lab results as appropriate  - Instruct patient on fluid and nutrition as appropriate  Outcome: Progressing  Goal: Fluid balance maintained  Description: INTERVENTIONS:  - Monitor labs   - Monitor I/O and WT  - Instruct patient on fluid and nutrition as appropriate  - Assess for signs & symptoms of volume excess or deficit  Outcome: Progressing     Problem: Prexisting or High Potential for Compromised Skin Integrity  Goal: Skin integrity is maintained or improved  Description: INTERVENTIONS:  - Identify patients at risk for skin breakdown  - Assess and monitor skin integrity including under and around medical devices   - Assess and monitor nutrition and hydration status  - Monitor labs  - Assess for incontinence   - Turn and reposition patient  - Assist with mobility/ambulation  - Relieve pressure over chente prominences   - Avoid friction and shearing  - Provide appropriate hygiene as needed including keeping skin clean and dry  - Evaluate need for skin moisturizer/barrier cream  - Collaborate with interdisciplinary team  - Patient/family teaching  - Consider wound care consult    Assess:  - Review Praful scale daily    - Inspect skin when repositioning, toileting, and assisting with ADLS  - Assess extremities for adequate circulation and sensation     Bed Management:  - Have minimal linens on bed & keep smooth, unwrinkled  - Change linens as needed when moist or perspiring  - Avoid sitting or lying in one position for more than 2 hours while in  bed?Keep HOB at 45 degrees   - Toileting:  - Offer bedside commode  - Assess for incontinence  - Use incontinent care products after each incontinent episode     Activity:  - Encourage activity and walks on unit  - Encourage or provide ROM exercises   - Use appropriate equipment to lift or move patient in bed    Skin Care:  - Avoid use of baby powder, tape, friction and shearing, hot water or constrictive clothing  - Do not massage red bony areas    Outcome: Progressing     Problem: RESPIRATORY - ADULT  Goal: Achieves optimal ventilation and oxygenation  Description: INTERVENTIONS:  - Assess for changes in respiratory status  - Assess for changes in mentation and behavior  - Position to facilitate oxygenation and minimize respiratory effort  - Oxygen administered by appropriate delivery if ordered  - Initiate smoking cessation education as indicated  - Encourage broncho-pulmonary hygiene including cough, deep breathe, Incentive Spirometry  - Assess the need for suctioning and aspirate as needed  - Assess and instruct to report SOB or any respiratory difficulty  - Respiratory Therapy support as indicated  Outcome: Progressing     Problem: SAFETY,RESTRAINT: NV/NON-SELF DESTRUCTIVE BEHAVIOR  Goal: Remains free of harm/injury (restraint for non violent/non self-detsructive behavior)  Description: INTERVENTIONS:  - Instruct patient/family regarding restraint use   - Assess and monitor physiologic and psychological status   - Provide interventions and comfort measures to meet assessed patient needs   - Identify and implement measures to help patient regain control  - Assess readiness for release of restraint   Outcome: Progressing  Goal: Returns to optimal restraint-free functioning  Description: INTERVENTIONS:  - Assess the patient's behavior and symptoms that indicate continued need for restraint  - Identify and implement measures to help patient regain control  - Assess readiness for release of restraint   Outcome:  Progressing

## 2025-07-09 NOTE — ASSESSMENT & PLAN NOTE
Patient sent in from dialysis center for altered mental status and agitation.  CT head: No acute intracranial abnormality. Chronic microangiopathic changes.   Initial concern for uremia given patient has shortened or missed dialysis sessions past two weeks, however, per nephrology less likely uremia alone contributing  UA unrevealing, glucose stable  Follow up TSH, B12, ammonia  No signs/symptoms of infection  Requiring restraints due to agitation  Neurology consulted  MRI brain ordered

## 2025-07-09 NOTE — HEMODIALYSIS
Post-Dialysis RN Treatment Note    Blood Pressure:  Pre 171/63 mm/Hg  Post 190/77 mmHg   EDW:  94 kg    Weight:  Pre 91 kg   Post 90 kg   Mode of weight measurement: Bed Scale   Volume Removed:  1000 ml    Treatment duration: 120 minutes    NS given:  No    Treatment shortened No   Medications given during Rx: None Reported   Estimated Kt/V:  None Reported   Access type: Permacath/TDC   Needle Gauge: n/a   Access Issues: No    Report called to primary nurse:   Yes Patricia MULLINS RN

## 2025-07-09 NOTE — NURSING NOTE
Pt being agitated,screaming and  trying to get off the bed ,Md Basim Landa notified Zyprexa IM ordered by provider.Monitoring ongoing.

## 2025-07-09 NOTE — ASSESSMENT & PLAN NOTE
-Patient has a history of grade 1 diastolic dysfunction.  Prior echocardiogram with EF 60 to 65%  - Patient has chronic lower extremity edema-currently edema appears improved and will continue ultrafiltration goal to dry weight

## 2025-07-09 NOTE — ASSESSMENT & PLAN NOTE
Lab Results   Component Value Date    EGFR 7 07/09/2025    EGFR 5 07/08/2025    EGFR 13 06/13/2025    CREATININE 6.98 (H) 07/09/2025    CREATININE 8.89 (H) 07/08/2025    CREATININE 4.29 (H) 06/13/2025     Per medical and nephrology management

## 2025-07-09 NOTE — ASSESSMENT & PLAN NOTE
-Outpatient unit [facility] due to recent rehab admission.  Was prior at [facility]  - Access-right IJ PermCath  - Prior estimated dry weight on discharge in June 2025 was 89.5 kg but dry weight was being challenged with dialysis  - Noted patient only had partial treatment last Thursday and only had 35 minutes of treatment on Saturday.  Was sent to the hospital on July 8 with erratic behavior  - Outpatient schedule-TTS  - Patient has been noncompliant with outpatient dialysis per my discussion with outpatient dialysis nurse-for at least the past 2 weeks he has been shortening treatment to 2-hour treatments and this past Saturday only did 30-minute treatments  - 7/8-patient completed dialysis treatment for 3-hour treatment with lower flows as he has been shortening many treatments  - 7/9-plan for short dialysis treatment for clearance    Plan  - Workup for encephalopathy in progress by primary team-etiology is less likely uremia alone but to note patient has been shortening dialysis treatments for at least the last 2 weeks.  We will plan for 2-hour treatment today with lower flows and plan for regular treatment tomorrow  - Dialysis today for clearance  - Reviewed case with HD charge nurse  - Reviewed case with primary team advanced practitioner and we are agreement with dialysis today  - Retinal detachment concern on CT head-further plans per primary team

## 2025-07-09 NOTE — PROGRESS NOTES
Progress Note - Nephrology   Name: Naresh Carpio 65 y.o. male I MRN: [id number]  Unit/Bed#: 4 [facility] 414-01 I Date of Admission: 7/8/2025   Date of Service: 7/9/2025 I Hospital Day: 0    Assessment & Plan  ESRD (end stage renal disease) (HCC)  -Outpatient unit [facility] due to recent rehab admission.  Was prior at [facility]  - Access-right IJ PermCath  - Prior estimated dry weight on discharge in June 2025 was 89.5 kg but dry weight was being challenged with dialysis  - Noted patient only had partial treatment last Thursday and only had 35 minutes of treatment on Saturday.  Was sent to the hospital on July 8 with erratic behavior  - Outpatient schedule-TTS  - Patient has been noncompliant with outpatient dialysis per my discussion with outpatient dialysis nurse-for at least the past 2 weeks he has been shortening treatment to 2-hour treatments and this past Saturday only did 30-minute treatments  - 7/8-patient completed dialysis treatment for 3-hour treatment with lower flows as he has been shortening many treatments  - 7/9-plan for short dialysis treatment for clearance    Plan  - Workup for encephalopathy in progress by primary team-etiology is less likely uremia alone but to note patient has been shortening dialysis treatments for at least the last 2 weeks.  We will plan for 2-hour treatment today with lower flows and plan for regular treatment tomorrow  - Dialysis today for clearance  - Reviewed case with HD charge nurse  - Reviewed case with primary team advanced practitioner and we are agreement with dialysis today  - Retinal detachment concern on CT head-further plans per primary team  Secondary hyperparathyroidism (HCC)  -Home regimen includes sevelamer  At risk for acid-base imbalance  -Bicarbonate appropriate  Electrolyte imbalance risk  -Potassium 5.5.  Likely in the setting of poor compliance with dialysis and plan for extra treatment 7/9 for short treatment  HTN (hypertension)  -Prior  was on nifedipine 30 mg daily and furosemide 20 mg twice daily which was adjusted in June 2025 to metoprolol 25 mg twice daily, nifedipine 30 mg daily  - To note, patient was noted to have bradycardia as outpatient?  Will need to confirm if patient is on beta-blocker.  Currently heart rate is okay  CHF (congestive heart failure) (HCC)  -Patient has a history of grade 1 diastolic dysfunction.  Prior echocardiogram with EF 60 to 65%  - Patient has chronic lower extremity edema-currently edema appears improved and will continue ultrafiltration goal to dry weight          Anemia  -Hold for now given mentation changes  - Is on Mircera and Venofer as outpatient  Agitation  Etiology unclear  - CT head  unrevealing for acute issue  - Urinalysis unrevealing  - There may be a uremic component as the patient has had poor dialysis over the past at least 2 weeks  AMS (altered mental status)  -Evaluation in progress per primary team    I have reviewed the nephrology recommendations including HD 2 hr today, with primary team AP, and we are in agreement with renal plan including the information outlined above.     Subjective   Brief History of Admission - Naresh Carpio is a 65 y.o. male with past medical history of ESRD on hemodialysis at [facility] TTS, recent hospitalization in June 2025 with respiratory failure in the setting of volume overload/COVID-19 infection, hypertension, retinopathy, diastolic heart failure, paroxysmal A-fib, prior history of retroperitoneal hematoma in April after a fall and underwent IR embolization of distal right deep circumflex iliac artery and superior right gluteal artery, who was admitted to [facility] 7/8 after presenting with agitation and confusion. A renal consultation is requested today for assistance in the management of ESRD.  Patient is not providing adequate history.  I called [facility] dialysis unit and spoke with the nurse.  Per nursing team, patient was  aggressive and agitated and confused and was sent to the ER.  This has been an ongoing issue over the past 1 to 2 weeks.  He has been shortening his diet treatments to approximately 2-hour treatments for the past 2 weeks and this past Saturday he only did 30 minutes of treatment.  The patient states that he is agreeable to the dialysis but he has no answer any other questions..     Past 24 hours-is now in restraints.  Is confused when I am interviewing the patient.  Blood pressure is 120-170 systolic.  Afebrile.  On room air.    Objective :  Temp:  [96.6 °F (35.9 °C)-99.5 °F (37.5 °C)] 98.5 °F (36.9 °C)  HR:  [56-99] 71  BP: (120-200)/(65-98) 177/90  Resp:  [14-20] 18  SpO2:  [92 %-98 %] 93 %  O2 Device: None (Room air)    Current Weight: Weight - Scale: 97 kg (213 lb 13.5 oz)  First Weight: Weight - Scale: 97 kg (213 lb 13.5 oz)  I/O         07/07 0701  07/08 0700 07/08 0701  07/09 0700 07/09 0701  07/10 0700    I.V. (mL/kg)  500 (5.2)     Total Intake(mL/kg)  500 (5.2)     Urine (mL/kg/hr)  250     Other  2500     Total Output  2750     Net  -2250                  Physical Exam  General: NAD  Skin: no rash  Eyes: anicteric sclera  ENT: Slightly dry mucous membrane  Neck: supple  Chest: CTA b/l, no ronchii, no wheeze, no rubs, no rales but limited exam as the patient is not participating in deep inspiration  CVS: s1s2, no murmur, no gallop, no rub  Abdomen: soft, nontender, nl sounds  Extremities: no edema LE b/l, lower extremity chronic wounds noted on toes and erythema  : no gaitan  Neuro: Is alert but confused and agitated and in restraints  Psych: Is confused and agitated    Medications:  Current Medications[1]      Lab Results: I have reviewed the following results:  Results from last 7 days   Lab Units 07/09/25  0707 07/08/25  1144   WBC Thousand/uL 6.85 5.83   HEMOGLOBIN g/dL 11.4* 10.3*   HEMATOCRIT % 37.9 34.5*   PLATELETS Thousands/uL 202 198   POTASSIUM mmol/L 5.5* 5.7*   CHLORIDE mmol/L 96 94*   CO2  "mmol/L 25 28   BUN mg/dL 35* 54*   CREATININE mg/dL 6.98* 8.89*   CALCIUM mg/dL 8.7 9.1   MAGNESIUM mg/dL 2.2  --    PHOSPHORUS mg/dL 4.4*  --    ALBUMIN g/dL  --  3.6       Administrative Statements     Portions of the record may have been created with voice recognition software. Occasional wrong word or \"sound a like\" substitutions may have occurred due to the inherent limitations of voice recognition software. Read the chart carefully and recognize, using context, where substitutions have occurred.If you have any questions, please contact the dictating provider.       [1]   Current Facility-Administered Medications:     acetaminophen (TYLENOL) tablet 650 mg, 650 mg, Oral, Q6H PRN, GRANT Valdivia    albuterol inhalation solution 2.5 mg, 2.5 mg, Nebulization, Q6H PRN, Nirav Bruce MD    allopurinol (ZYLOPRIM) tablet 100 mg, 100 mg, Oral, Daily, GRANT Valdivia    aspirin chewable tablet 81 mg, 81 mg, Oral, Daily, GRANT Valdivia    atorvastatin (LIPITOR) tablet 80 mg, 80 mg, Oral, Daily With Dinner, GRANT Valdivia    famotidine (PEPCID) tablet 20 mg, 20 mg, Oral, HS, GRANT Valdivia    furosemide (LASIX) tablet 20 mg, 20 mg, Oral, BID, GRANT Valdivia    gabapentin (NEURONTIN) capsule 100 mg, 100 mg, Oral, Once per day on Monday Wednesday Friday, GRANT Valdivia    heparin (porcine) subcutaneous injection 5,000 Units, 5,000 Units, Subcutaneous, Q8H KEMAL, GRANT Valdivia, 5,000 Units at 07/09/25 0711    lidocaine (LIDODERM) 5 % patch 1 patch, 1 patch, Topical, Daily, GRANT Valdivia, 1 patch at 07/08/25 1809    melatonin tablet 6 mg, 6 mg, Oral, HS, GRANT Valdivia    methocarbamol (ROBAXIN) tablet 500 mg, 500 mg, Oral, TID PRN, GRANT Valdivia    metoprolol succinate (TOPROL-XL) 24 hr tablet 25 mg, 25 mg, Oral, Q12H KEMAL, GRANT Valdivia    " NIFEdipine (PROCARDIA XL) 24 hr tablet 30 mg, 30 mg, Oral, After Dinner, GRANT Valdivia    oxyCODONE (ROXICODONE) split tablet 2.5 mg, 2.5 mg, Oral, Q8H PRN, GRANT Valdivia    senna (SENOKOT) tablet 8.6 mg, 8.6 mg, Oral, Daily With Lunch, GRANT Valdivia    sevelamer (RENAGEL) tablet 1,600 mg, 1,600 mg, Oral, TID With Meals, GRANT Valdivia

## 2025-07-09 NOTE — PLAN OF CARE
Pt. On HD treatment for 2 hours with a UF goal of 1 L as tolerated. Pt. On a 2 k 2.5 basim bath for a potassium of 5.5 on 07/09/25.   Problem: METABOLIC, FLUID AND ELECTROLYTES - ADULT  Goal: Electrolytes maintained within normal limits  Description: INTERVENTIONS:  - Monitor labs and assess patient for signs and symptoms of electrolyte imbalances  - Administer electrolyte replacement as ordered  - Monitor response to electrolyte replacements, including repeat lab results as appropriate  - Instruct patient on fluid and nutrition as appropriate  Outcome: Progressing  Goal: Fluid balance maintained  Description: INTERVENTIONS:  - Monitor labs   - Monitor I/O and WT  - Instruct patient on fluid and nutrition as appropriate  - Assess for signs & symptoms of volume excess or deficit  Outcome: Progressing

## 2025-07-09 NOTE — PHYSICAL THERAPY NOTE
PT Cancellation Note       07/09/25 1504   Note Type   Note type Cancelled Session   Cancel Reasons Patient off floor/hemodialysis   Additional Comments Pt getting bedside dialysis. Will follow-up as schedule allows.   Licensure   [place] License Number  Lizeth Gill [id number]

## 2025-07-09 NOTE — ASSESSMENT & PLAN NOTE
Wt Readings from Last 3 Encounters:   07/08/25 97 kg (213 lb 13.5 oz)   06/07/25 89.7 kg (197 lb 12 oz)   05/13/25 92.6 kg (204 lb 3.2 oz)     Hx G1DD with previous EF 60-65%  BNP 3081, noncompliant with dialysis sessions recently  Dialysis with ultrafiltration per nephrology

## 2025-07-09 NOTE — CONSULTS
[facility]   Neurology Initial Consult    Naresh Carpio is a 65 y.o. male  [address] [employer] [address] [place] [id number]          Information obtained from:   Chief Complaint   Patient presents with    Altered Mental Status     Pt arrives from nursing home, reports he was altered. At time able to answer orientation questions appropriately .          Assessment & Plan  AMS (altered mental status)  Patient is a 65-year-old male from SNF with PMH of ESRD on dialysis, A-fib, anemia, HLD, HTN, DM, TIA and TME postdialysis who presents to the ER with altered mental status, generalized complaints of weakness and pain and missed/incomplete dialysis sessions.  Patient reported he is decreasing his dialysis times as it causes him increased neuropathy type pains.  He is unable to tolerate it and generally touches surfaces short.  Over course of 2 weeks, he has had decrease in time frames, most recently was Saturday lasting approximately 20 to 30 minutes.  On arrival he was hypertensive with electrolyte abnormalities.  High creatinine and BUN were also above his baseline.  Patient received dialysis last evening, went at a slower rate for his tolerance.  This a.m. he continued to be agitated, hollering out and altered therefore recommendations for neurology consultation regarding possible central cause.  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  Today patient is somewhat lethargic, he is able to open his eyes with notable right eye ptosis.  Reviewed patient's history he does have some mild ptosis noted to the right eye on his photos, somewhat exaggerated today.  He is able to open his eyes and has EOMI however is very intolerant to light and intolerant to testing.  Patient is moving all of his extremities, has adequate strength but his exam is limited due to his willingness to fully participate.  Patient does doze back off  intermittently throughout his exam.  On exam patient does have some noticeable weakness in the left upper and lower extremities compared to the right although formal effort is not been performed.  Suspect toxic metabolic encephalopathy related to chronic renal failure without effective dialysis therapies.    Monitored on telemetry  Neurological assessments  Continue baby aspirin 81 mg daily  Continue atorvastatin 80 mg daily  Due to left-sided upper and lower extremities will get MRI of the brain.  Use delirium precautions, maintain sleep cycle  Avoid mentation altering medication options.  ESRD (end stage renal disease) ([facility])  Lab Results   Component Value Date    EGFR 7 07/09/2025    EGFR 5 07/08/2025    EGFR 13 06/13/2025    CREATININE 6.98 (H) 07/09/2025    CREATININE 8.89 (H) 07/08/2025    CREATININE 4.29 (H) 06/13/2025     Per medical and nephrology management  Secondary hyperparathyroidism (HCC)    At risk for acid-base imbalance  Per medical and nephrology management  Electrolyte imbalance risk    HTN (hypertension)    CHF (congestive heart failure) ([facility])  Wt Readings from Last 3 Encounters:   07/08/25 97 kg (213 lb 13.5 oz)   06/07/25 89.7 kg (197 lb 12 oz)   05/13/25 92.6 kg (204 lb 3.2 oz)     For medical and nephrology management    Anemia  For medical management  Agitation  Medical team management for his agitation  Paroxysmal atrial fibrillation (HCC)        HPI:  Naresh Carpio is a 66yo male with PMH of end-stage renal disease on dialysis, A-fib, anemia, bradycardia, DM, HLD, HTN, TIA and history of TME with 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  On arrival patient's blood pressure was 200/57.  He had electrolyte abnormalities with sodium of 131, potassium 5.7.  His  creatinine was 8.89, BUN 54.  Elevated BNP.  Patient was admitted and nephrology was consulted.  Patient had dialysis on 7/8/2025, slow down the flow for patient to tolerate dialysis run better.  Was able to get 3 hours of dialysis completed.  He is scheduled for dialysis again today.  To note patient continues to have altered mental status, nephrology concern beyond uremia therefore neurology was consulted for further evaluation.  Patient has had prior neurological evaluations, last on 10/9/2024 for similar circumstances.  At that point in time patient had aphasia and received TNK on 10/8/2024.  Clinically he seemed better, he was following commands and making conversation.  His MRI was negative for an acute process.  He has severe left atrial dilatation on his echocardiogram at that time.  Patient had right carotid moderate atherosclerotic disease for which vascular surgery was recommended to follow.  Patient was recommended to continue on AP therapy and currently is on baby aspirin 81 mg daily.  He is to main atorvastatin, currently at 80 mg daily.  Met with patient at bedside, he is lethargic and fatigued.  He is arousable and able to open his eyes and communicate.  Patient has right eye irritation, denies any pain but he has notable ptosis.  He is able to open the eye on command but does not keep it open.  He has sensitivities to light on his exam today.  Patient indicated that he is having significant 10 out of 10 headache to the apex of his head, also reports having some lightheadedness/dizziness with it.  He denies any other associated symptoms.  On his neurological exam however he does have some intolerance to light.  He has notable weakness to the left upper and lower extremities compared to the right side.  Patient is alert and oriented to himself, he knows that it is July but unable to provide location such as city or state as well as year or day.  His speech is clear although slow to respond.  His  answers are appropriate and he is able to follow some commands when he is willing to participate.  Patient is not always willing to participate in his neurological exam, that is slightly limited.  Patient has had ongoing issues with regards to mentation and alterations during his dialysis days specifically when he is uremic however he isdisplaying some significant weakness to the left upper and lower extremities compared to the right therefore we will get an MRI of the brain to rule out ischemic disease.  Patient can have Tylenol for his headache at this point in time.  Patient is also limited on his neurological exam due to generalized body pain.  He is on gabapentin for neuropathy, notes it is not effective to treat his pain overall he continues to suffer with pain all the time.  Will talk about increasing his gabapentin dosing however may be limited due to his kidney function and or history of dependencies.  Will discuss with attending first before making any further adjustments.        Past Medical History[1]    Past Surgical History[2]    Allergies[3]    Current Medications[4]    Social History     Socioeconomic History    Marital status:      Spouse name: Not on file    Number of children: Not on file    Years of education: Not on file    Highest education level: Not on file   Occupational History    Not on file   Tobacco Use    Smoking status: Never     Passive exposure: Never    Smokeless tobacco: Never   Vaping Use    Vaping status: Never Used   Substance and Sexual Activity    Alcohol use: Not Currently     Alcohol/week: 0.0 standard drinks of alcohol     Comment: 0    Drug use: Never    Sexual activity: Not on file   Other Topics Concern    Not on file   Social History Narrative    Not on file     Social Drivers of Health     Financial Resource Strain: Not on file   Food Insecurity: No Food Insecurity (7/8/2025)    Nursing - Inadequate Food Risk Classification     Worried About Running Out of Food  in the Last Year: Never true     Ran Out of Food in the Last Year: Never true     Ran Out of Food in the Last Year: Never true   Transportation Needs: No Transportation Needs (7/8/2025)    Nursing - Transportation Risk Classification     Lack of Transportation: Not on file     Lack of Transportation: No   Physical Activity: Not on file   Stress: Not on file   Social Connections: Not on file   Intimate Partner Violence: Unknown (7/8/2025)    Nursing IPS     Feels Physically and Emotionally Safe: Not on file     Physically Hurt by Someone: Not on file     Humiliated or Emotionally Abused by Someone: Not on file     Physically Hurt by Someone: No     Hurt or Threatened by Someone: No   Housing Stability: Unknown (7/8/2025)    Nursing: Inadequate Housing Risk Classification     Has Housing: Not on file     Worried About Losing Housing: Not on file     Unable to Get Utilities: Not on file     Unable to Pay for Housing in the Last Year: No     Has Housing: No       Family History[5]      Review of systems:  Please see HPI for positive symptoms.   Constitutional: No fever, no chills, no weight change.   + Fatigue + lightheadedness/dizziness  Ocular: No diplopia, no blurred vision, spots/zigzag lines  HEENT:  No sore throat or congestion.   + Severe headache at the apex, 10/10  COR:  No chest pain. No palpitations.   Lungs:  no sob  GI:  no  nausea, no vomiting, no diarrhea, no constipation, no anorexia.   :  No dysuria, frequency, or urgency. No hematuria.    Musculoskeletal:  No joint pain or swelling   + Generalized body pain  Skin:  No rash or itching.   Psychiatric:  no anxiety, no depression.   Endocrine:  No polyuria or polydipsia.    Physical examination:  /73   Pulse 73   Temp 97.8 °F (36.6 °C)   Resp 16   Wt 97 kg (213 lb 13.5 oz)   SpO2 93%   BMI 28.21 kg/m²     GENERAL APPEARANCE:  The patient is alert, oriented x 2  HEENT:  Head is normocephalic.  Pupils are equal and reactive.    NECK:  Supple  without lymphadenopathy.   HEART:  Regular rate and rhythm.  LUNGS: No audible wheezing or stridor heard.  ABDOMEN:  Soft, nontender, nondistended   EXTREMITIES:  Without cyanosis, clubbing or edema.     Mental status:  The patient is groggy and lethargic, oriented to self and month.  Unaware of place, city and/or year or day.  Alert, attentive, and oriented.   Speech is clear but slowed, answering appropriate questions.  Limited abilities for naming and comprehension due to patient's tolerance for testing.  Cranial nerves:  CN II: Visual fields are limited in testing due to right eye ptosis and sensitivities to light.  Unable to assess fundoscopic exam.  Left pupil's 3 mm and briskly reactive to light.   CN III, IV, VI: At primary gaze, there is no eye deviation.  EOMI although limited due to right eye ptosis and frequent eye closure  CN V: Facial sensation is intact  in all 3 divisions bilaterally.   Corneal responses are intact.  CN VII: Face is symmetric with normal eye closure and smile.  CN VIII: Hearing is normal to rubbing fingers  CN IX, X: Palate elevates symmetrically. Phonation is hypophonic  CN XI: Head turning and shoulder shrug are intact  CN XII: Tongue is midline with normal movements and no atrophy.  Motor:  There is no pronator drift testing available, patient is unable to tolerate raised arms without pain.    Muscle bulk and tone are normal.   Muscle exam  Arm Right Left Leg Right Left   Deltoid 5/5 4/5 Iliopsoas 5/5 4/5   Biceps 5/5 4/5 Quads 5/5 4/5   Triceps 5/5 4/5 Hamstrings 5/5 4/5   Wrist Extension 5/5 4/5 Ankle Dorsi Flexion 5/5 4/5   Wrist Flexion 5/5 4/5 Ankle Plantar Flexion 5/5 4/5        Reflexes    RJ BJ TJ KJ AJ Plantars Love's   Right 1+ 1+  1+ 0 Downgoing Not present   Left 1+ 1+  1+ 0 Downgoing Not present      Sensory:  Light touch, Temperature, position sense, and vibration sense are intact in fingers and toes.  Coordination:  Patient with limited ability to participate in  neurological exam  Romberg deferred for safety  Gait/Stance:  Deferred for safety.    Lab Results   Component Value Date    WBC 6.85 07/09/2025    HGB 11.4 (L) 07/09/2025    HCT 37.9 07/09/2025    MCV 94 07/09/2025     07/09/2025     Lab Results   Component Value Date    HGBA1C 4.8 06/04/2025     Lab Results   Component Value Date    ALT 5 (L) 07/08/2025    AST 8 (L) 07/08/2025    ALKPHOS 78 07/08/2025     Lab Results   Component Value Date    GLUCOSE 162 (H) 04/03/2025    CALCIUM 8.7 07/09/2025    K 5.5 (H) 07/09/2025    CO2 25 07/09/2025    CL 96 07/09/2025    BUN 35 (H) 07/09/2025    CREATININE 6.98 (H) 07/09/2025     Cholesterol as of 6/4/2025 was 78, LDL 28    Review of reports and notes reveal:  Independent Interpretation of images or specimens:  CT head without contrast  Result Date: 7/8/2025  No acute intracranial abnormality.  Chronic microangiopathic changes. Redemonstration of posterior right globe hyperdensity that again is suggestive of retinal detachment. Correlate clinically. Workstation performed: [id number]           Thank you for this consult.    Total time of encounter: 70 Minutes  More than 50% of time was spent in counseling and coordination of care of patient.  Discussed with patient at bedside, medical team and attending neurology MD.                   [1]   Past Medical History:  Diagnosis Date    Acute cystitis 10/18/2024    Acute on chronic anemia 01/23/2022    Atrial fibrillation (HCC)     Bilateral sacral fracture, closed (HCC) 07/03/2024    Bradycardia 09/05/2023    Diabetes mellitus (HCC)     Diabetes mellitus type 2 with peripheral artery disease (HCC)     ESRD (end stage renal disease) on dialysis (HCC)     Hematuria 10/10/2024    Hematuria 10/10/2024    Hyperkalemia 04/06/2024    Hyperkalemia 04/06/2024    Hyperlipidemia     Hypertension     Left toe amputee (HCC)     Subacute osteomyelitis of right foot (HCC)     TIA (transient ischemic attack)     Toxic metabolic  encephalopathy 10/08/2024    Traumatic rhabdomyolysis (HCC) 08/19/2023   [2]   Past Surgical History:  Procedure Laterality Date    AMPUTATION Left     left foot resection 10 years ago dr tran    HEMODIALYSIS ADULT  1/18/2025    HEMODIALYSIS ADULT  2/27/2025    HEMODIALYSIS ADULT  6/4/2025    IR EMBOLIZATION (SPECIFY VESSEL OR SITE)  4/3/2025    IR EMBOLIZATION (SPECIFY VESSEL OR SITE)  4/7/2025    IR LOWER EXTREMITY ANGIOGRAM  08/29/2023    IR TEMPORARY DIALYSIS CATHETER PLACEMENT  08/25/2023    IR TUNNELED CENTRAL LINE REMOVAL  08/23/2023    IR TUNNELED DIALYSIS CATHETER PLACEMENT  01/27/2022    IR TUNNELED DIALYSIS CATHETER PLACEMENT  08/30/2023    MT AMPUTATION METATARSAL W/TOE SINGLE Right 8/31/2023    Procedure: RIGHT PARTIAL 1ST RAY RESECTION WITH  WOUND VAC APPLICATION;  Surgeon: Won Parmar DPM;  Location: [facility];  Service: Podiatry   [3] No Known Allergies  [4]   Current Facility-Administered Medications:     acetaminophen (TYLENOL) tablet 650 mg, 650 mg, Oral, Q6H PRN, GRANT Valdivia    albuterol inhalation solution 2.5 mg, 2.5 mg, Nebulization, Q6H PRN, Nirav Bruce MD    allopurinol (ZYLOPRIM) tablet 100 mg, 100 mg, Oral, Daily, GRANT Valdivia, 100 mg at 07/09/25 1213    aspirin chewable tablet 81 mg, 81 mg, Oral, Daily, GRANT Valdivia, 81 mg at 07/09/25 1214    atorvastatin (LIPITOR) tablet 80 mg, 80 mg, Oral, Daily With Dinner, GRANT Valdivia, 80 mg at 07/09/25 1612    famotidine (PEPCID) tablet 20 mg, 20 mg, Oral, HS, GRANT Valdivia    furosemide (LASIX) tablet 20 mg, 20 mg, Oral, BID, GRANT Valdivia, 20 mg at 07/09/25 1706    gabapentin (NEURONTIN) capsule 100 mg, 100 mg, Oral, Once per day on Monday Wednesday Friday, GRANT Valdivia, 100 mg at 07/09/25 1215    heparin (porcine) subcutaneous injection 5,000 Units, 5,000 Units, Subcutaneous, Q8H Alisson SOMMER CRNP,  5,000 Units at 07/09/25 1300    hydrALAZINE (APRESOLINE) injection 5 mg, 5 mg, Intravenous, Q6H PRN, Nallely Pisano PA-C, 5 mg at 07/09/25 1604    lidocaine (LIDODERM) 5 % patch 1 patch, 1 patch, Topical, Daily, GRANT Valdivia, 1 patch at 07/09/25 0905    melatonin tablet 6 mg, 6 mg, Oral, HS, GRANT Valdivia    methocarbamol (ROBAXIN) tablet 500 mg, 500 mg, Oral, TID PRN, GRANT Valdivia, 500 mg at 07/09/25 1705    metoprolol succinate (TOPROL-XL) 24 hr tablet 25 mg, 25 mg, Oral, Q12H KEMAL, GRANT Valdivia, 25 mg at 07/09/25 1214    NIFEdipine (PROCARDIA XL) 24 hr tablet 30 mg, 30 mg, Oral, After Dinner, GRANT Valdivia, 30 mg at 07/09/25 1706    oxyCODONE (ROXICODONE) split tablet 2.5 mg, 2.5 mg, Oral, Q8H PRN, GRANT Valdivia, 2.5 mg at 07/09/25 1235    senna (SENOKOT) tablet 8.6 mg, 8.6 mg, Oral, Daily With Lunch, GRANT Valdivia, 8.6 mg at 07/09/25 1214    sevelamer (RENAGEL) tablet 1,600 mg, 1,600 mg, Oral, TID With Meals, GRANT Valdivia  [5] No family history on file.

## 2025-07-09 NOTE — ASSESSMENT & PLAN NOTE
Lab Results   Component Value Date    EGFR 7 07/09/2025    EGFR 5 07/08/2025    EGFR 13 06/13/2025    CREATININE 6.98 (H) 07/09/2025    CREATININE 8.89 (H) 07/08/2025    CREATININE 4.29 (H) 06/13/2025   For past two weeks patient has been shortening dialysis sessions  TTS schedule  Nephrology consulted for dialysis management

## 2025-07-10 ENCOUNTER — APPOINTMENT (INPATIENT)
Dept: DIALYSIS | Facility: HOSPITAL | Age: 65
DRG: 698 | End: 2025-07-10
Attending: INTERNAL MEDICINE
Payer: MEDICARE

## 2025-07-10 ENCOUNTER — APPOINTMENT (INPATIENT)
Dept: RADIOLOGY | Facility: HOSPITAL | Age: 65
DRG: 698 | End: 2025-07-10
Payer: MEDICARE

## 2025-07-10 PROBLEM — H33.20 RETINAL DETACHMENT: Status: ACTIVE | Noted: 2025-07-10

## 2025-07-10 PROBLEM — S91.302S WOUND, OPEN, FOOT, LEFT, SEQUELA: Status: ACTIVE | Noted: 2025-07-10

## 2025-07-10 PROBLEM — R10.9 ABDOMINAL PAIN: Status: ACTIVE | Noted: 2025-07-10

## 2025-07-10 LAB
AMMONIA PLAS-SCNC: 40 UMOL/L (ref 18–72)
ANION GAP SERPL CALCULATED.3IONS-SCNC: 14 MMOL/L (ref 4–13)
ATRIAL RATE: 57 BPM
BUN SERPL-MCNC: 29 MG/DL (ref 5–25)
CALCIUM SERPL-MCNC: 8.4 MG/DL (ref 8.4–10.2)
CHLORIDE SERPL-SCNC: 97 MMOL/L (ref 96–108)
CO2 SERPL-SCNC: 22 MMOL/L (ref 21–32)
CREAT SERPL-MCNC: 6.08 MG/DL (ref 0.6–1.3)
ERYTHROCYTE [DISTWIDTH] IN BLOOD BY AUTOMATED COUNT: 15.4 % (ref 11.6–15.1)
GFR SERPL CREATININE-BSD FRML MDRD: 8 ML/MIN/1.73SQ M
GLUCOSE SERPL-MCNC: 74 MG/DL (ref 65–140)
HCT VFR BLD AUTO: 39.5 % (ref 36.5–49.3)
HGB BLD-MCNC: 11.8 G/DL (ref 12–17)
MAGNESIUM SERPL-MCNC: 2.1 MG/DL (ref 1.9–2.7)
MCH RBC QN AUTO: 28.4 PG (ref 26.8–34.3)
MCHC RBC AUTO-ENTMCNC: 29.9 G/DL (ref 31.4–37.4)
MCV RBC AUTO: 95 FL (ref 82–98)
MRSA NOSE QL CULT: ABNORMAL
MRSA NOSE QL CULT: ABNORMAL
P AXIS: 43 DEGREES
PHOSPHATE SERPL-MCNC: 4.3 MG/DL (ref 2.3–4.1)
PLATELET # BLD AUTO: 213 THOUSANDS/UL (ref 149–390)
PMV BLD AUTO: 9.8 FL (ref 8.9–12.7)
POTASSIUM SERPL-SCNC: 4.5 MMOL/L (ref 3.5–5.3)
PR INTERVAL: 196 MS
QRS AXIS: -4 DEGREES
QRSD INTERVAL: 84 MS
QT INTERVAL: 480 MS
QTC INTERVAL: 467 MS
RBC # BLD AUTO: 4.16 MILLION/UL (ref 3.88–5.62)
SODIUM SERPL-SCNC: 133 MMOL/L (ref 135–147)
T WAVE AXIS: 114 DEGREES
VENTRICULAR RATE: 57 BPM
VIT B12 SERPL-MCNC: 435 PG/ML (ref 180–914)
WBC # BLD AUTO: 7.59 THOUSAND/UL (ref 4.31–10.16)

## 2025-07-10 PROCEDURE — 84100 ASSAY OF PHOSPHORUS: CPT | Performed by: INTERNAL MEDICINE

## 2025-07-10 PROCEDURE — 82140 ASSAY OF AMMONIA: CPT | Performed by: INTERNAL MEDICINE

## 2025-07-10 PROCEDURE — 80048 BASIC METABOLIC PNL TOTAL CA: CPT | Performed by: INTERNAL MEDICINE

## 2025-07-10 PROCEDURE — 90935 HEMODIALYSIS ONE EVALUATION: CPT | Performed by: INTERNAL MEDICINE

## 2025-07-10 PROCEDURE — 85027 COMPLETE CBC AUTOMATED: CPT | Performed by: INTERNAL MEDICINE

## 2025-07-10 PROCEDURE — 99233 SBSQ HOSP IP/OBS HIGH 50: CPT | Performed by: PSYCHIATRY & NEUROLOGY

## 2025-07-10 PROCEDURE — 83735 ASSAY OF MAGNESIUM: CPT | Performed by: INTERNAL MEDICINE

## 2025-07-10 PROCEDURE — 74176 CT ABD & PELVIS W/O CONTRAST: CPT

## 2025-07-10 PROCEDURE — 70450 CT HEAD/BRAIN W/O DYE: CPT

## 2025-07-10 PROCEDURE — 93010 ELECTROCARDIOGRAM REPORT: CPT | Performed by: INTERNAL MEDICINE

## 2025-07-10 PROCEDURE — 97163 PT EVAL HIGH COMPLEX 45 MIN: CPT

## 2025-07-10 PROCEDURE — 82607 VITAMIN B-12: CPT

## 2025-07-10 PROCEDURE — 93880 EXTRACRANIAL BILAT STUDY: CPT

## 2025-07-10 PROCEDURE — 93880 EXTRACRANIAL BILAT STUDY: CPT | Performed by: SURGERY

## 2025-07-10 PROCEDURE — 99232 SBSQ HOSP IP/OBS MODERATE 35: CPT

## 2025-07-10 RX ORDER — ACETAMINOPHEN 325 MG/1
975 TABLET ORAL EVERY 8 HOURS SCHEDULED
Status: DISCONTINUED | OUTPATIENT
Start: 2025-07-10 | End: 2025-07-12 | Stop reason: HOSPADM

## 2025-07-10 RX ADMIN — SEVELAMER HYDROCHLORIDE 1600 MG: 800 TABLET ORAL at 13:38

## 2025-07-10 RX ADMIN — METOPROLOL SUCCINATE 25 MG: 25 TABLET, EXTENDED RELEASE ORAL at 13:40

## 2025-07-10 RX ADMIN — ATORVASTATIN CALCIUM 80 MG: 80 TABLET, FILM COATED ORAL at 17:30

## 2025-07-10 RX ADMIN — ALLOPURINOL 100 MG: 100 TABLET ORAL at 13:38

## 2025-07-10 RX ADMIN — METOPROLOL SUCCINATE 25 MG: 25 TABLET, EXTENDED RELEASE ORAL at 21:56

## 2025-07-10 RX ADMIN — FUROSEMIDE 20 MG: 20 TABLET ORAL at 21:55

## 2025-07-10 RX ADMIN — ACETAMINOPHEN 975 MG: 325 TABLET, FILM COATED ORAL at 13:34

## 2025-07-10 RX ADMIN — LIDOCAINE 1 PATCH: 50 PATCH CUTANEOUS at 14:38

## 2025-07-10 RX ADMIN — HEPARIN SODIUM 5000 UNITS: 5000 INJECTION INTRAVENOUS; SUBCUTANEOUS at 13:46

## 2025-07-10 RX ADMIN — HEPARIN SODIUM 5000 UNITS: 5000 INJECTION INTRAVENOUS; SUBCUTANEOUS at 21:54

## 2025-07-10 RX ADMIN — Medication 2.5 MG: at 12:47

## 2025-07-10 RX ADMIN — Medication 2.5 MG: at 05:59

## 2025-07-10 RX ADMIN — SENNOSIDES 8.6 MG: 8.6 TABLET, FILM COATED ORAL at 13:43

## 2025-07-10 RX ADMIN — Medication 2.5 MG: at 22:35

## 2025-07-10 RX ADMIN — SEVELAMER HYDROCHLORIDE 1600 MG: 800 TABLET ORAL at 17:30

## 2025-07-10 RX ADMIN — FUROSEMIDE 20 MG: 20 TABLET ORAL at 13:39

## 2025-07-10 RX ADMIN — HEPARIN SODIUM 5000 UNITS: 5000 INJECTION INTRAVENOUS; SUBCUTANEOUS at 05:56

## 2025-07-10 RX ADMIN — FAMOTIDINE 20 MG: 20 TABLET, FILM COATED ORAL at 21:56

## 2025-07-10 RX ADMIN — Medication 6 MG: at 21:56

## 2025-07-10 RX ADMIN — NIFEDIPINE 30 MG: 30 TABLET, EXTENDED RELEASE ORAL at 17:30

## 2025-07-10 RX ADMIN — ASPIRIN 81 MG: 81 TABLET, CHEWABLE ORAL at 13:39

## 2025-07-10 RX ADMIN — ACETAMINOPHEN 975 MG: 325 TABLET, FILM COATED ORAL at 21:56

## 2025-07-10 NOTE — ASSESSMENT & PLAN NOTE
Lab Results   Component Value Date    EGFR 8 07/10/2025    EGFR 7 07/09/2025    EGFR 5 07/08/2025    CREATININE 6.08 (H) 07/10/2025    CREATININE 6.98 (H) 07/09/2025    CREATININE 8.89 (H) 07/08/2025   For past two weeks patient has been shortening dialysis sessions  TTS schedule  Nephrology consulted for dialysis management

## 2025-07-10 NOTE — ASSESSMENT & PLAN NOTE
Etiology unclear  - CT head  unrevealing for acute issue  - Urinalysis unrevealing  - There may be a uremic component as the patient has had poor dialysis over the past at least 2 weeks  - neuro on board  - repeat CT head -   - repeat carotid u/s -

## 2025-07-10 NOTE — ASSESSMENT & PLAN NOTE
Patient sent in from dialysis center for altered mental status and agitation.  CT head: No acute intracranial abnormality. Chronic microangiopathic changes.   Initial concern for uremia given patient has shortened or missed dialysis sessions past two weeks, however, per nephrology less likely uremia alone contributing  UA unrevealing, glucose stable  TSH, B12, ammonia WNL  No signs/symptoms of infection  Requiring restraints due to agitation  Neurology consulted  MRI brain ordered - patient declined  Repeat CT head negative for acute intracranial abnormality  Patient does appear improved today though continues with intermittent agitation/confusion  Continue to monitor

## 2025-07-10 NOTE — PROGRESS NOTES
Progress Note - Neurology   Name: Naresh Carpio 65 y.o. male I MRN: [id number]  Unit/Bed#: 4 [place] [address]-01 I Date of Admission: 7/8/2025   Date of Service: 7/10/2025 I Hospital Day: 1    Assessment & Plan  AMS (altered mental status)  64 yo male with ESRD on dialysis, atrial fibrillation not maintained on AC, anemia, HTN, HLD, DM, TIA who presented to the hospital with altered mental status, generalized weakness, pain, and missed/incomplete dialysis sessions. Patient was noted to be hypertensive on arrival to the hospital with electrolytes and elevated BUN and creatinine.    Neuroimaging   7/8/2025 CTH:   No acute intracranial abnormality. Chronic microangiopathic changes.   Redemonstration of posterior right globe hyperdensity that again is suggestive of retinal detachment.  7/10/2025 Carotid dopplers:   <50% stenosis noted in B/L ICA. Plaque is heterogenous and irregular.   Vertebral artery flow is antegrade B/L.   No significant subclavian artery disease.     Patient appears improved in terms of mentation, alert and oriented on exam this morning but continues with irritability/agitation. Suspect symptoms secondary to metabolic abnormalities, also suspect behavioral component. He currently refuses to have MRI brain completed under any circumstance. Low suspicion for stroke at this time given no focal neurologic deficit noted, but for completeness, will check repeat CTH to evaluate for any evolving ischemia.    Plan:   - Check repeat CTH.   - Continue ASA 81 mg QD.   - Continue atorvastatin 40 mg QPM.   - Delirium precautions.   - Avoid CNS altering medication options.   - Monitor exam and notify with changes.  ESRD (end stage renal disease) ([facility])  Lab Results   Component Value Date    EGFR 8 07/10/2025    EGFR 7 07/09/2025    EGFR 5 07/08/2025    CREATININE 6.08 (H) 07/10/2025    CREATININE 6.98 (H) 07/09/2025    CREATININE 8.89 (H) 07/08/2025     - Dialysis as per nephrology team.  HTN (hypertension)  - Goal  normotension.   - Management as per primary team.  CHF (congestive heart failure) (HCC)  Wt Readings from Last 3 Encounters:   07/08/25 97 kg (213 lb 13.5 oz)   06/07/25 89.7 kg (197 lb 12 oz)   05/13/25 92.6 kg (204 lb 3.2 oz)     - Management as per primary team.    Anemia  - Management as per primary team.  Paroxysmal atrial fibrillation (HCC)  - Rate control as per primary team.   - Patient refuses anticoagulation due to bleeding risk. He is aware that he is at ongoing risk for stroke in setting of PAF not maintained on anticoagulation.    Recommendations for outpatient neurological follow up have yet to be determined.  I have discussed the above management plan in detail with the primary service.     Subjective   Patient is agitated during evaluation. Per discussion with bedside RN, patient yelling throughout the morning. He is currently in B/L wrist restraints. He notes that he has significant abdominal pain and reports he needs pain medication for this. He also notes that he will not get MRI brain completed.    Review of Systems   Constitutional:  Negative for chills, fatigue and fever.   HENT:  Negative for trouble swallowing.    Respiratory:  Negative for shortness of breath.    Cardiovascular:  Negative for chest pain.   Gastrointestinal:  Positive for abdominal pain.   Musculoskeletal:  Positive for back pain.   Skin:  Negative for rash.   Neurological:  Negative for dizziness, speech difficulty, weakness, light-headedness and headaches.   Psychiatric/Behavioral:  Positive for agitation.      Medications  Scheduled Meds:  Current Facility-Administered Medications   Medication Dose Route Frequency Provider Last Rate    acetaminophen  650 mg Oral Q6H PRN GRANT Valdivia      albuterol  2.5 mg Nebulization Q6H PRN Nirav Bruce MD      allopurinol  100 mg Oral Daily GRANT Valdivia      aspirin  81 mg Oral Daily GRANT Valdivia      atorvastatin  80 mg Oral Daily  With Dinner Alisson Morris, GRANT      famotidine  20 mg Oral HS Alisson Morris, GRANT      furosemide  20 mg Oral BID Alissonpino Morris, GRANT      gabapentin  100 mg Oral Once per day on Monday Wednesday Friday Alisson Mary GRANT Morris      heparin (porcine)  5,000 Units Subcutaneous Q8H [facility] Alisson Morris, GRANT      hydrALAZINE  5 mg Intravenous Q6H PRN Nallely Pisano PA-C      lidocaine  1 patch Topical Daily Alisson Morris, GRANT      melatonin  6 mg Oral HS Alisson Morris, GRANT      methocarbamol  500 mg Oral TID PRN Alisson MorrisGRANT      metoprolol succinate  25 mg Oral Q12H [facility] Alisson Morris, GRANT      NIFEdipine  30 mg Oral After Dinner Alisson Morris, GRANT      oxyCODONE  2.5 mg Oral Q8H PRN Alissontiago MorrisGRANT      senna  8.6 mg Oral Daily With Lunch Alissontiago MorrisGRANT      sevelamer  1,600 mg Oral TID With Meals Alisson MorrisGRANT       Continuous Infusions:   PRN Meds:.  acetaminophen    albuterol    hydrALAZINE    methocarbamol    oxyCODONE    Objective :  Temp:  [97.7 °F (36.5 °C)-98.8 °F (37.1 °C)] 97.7 °F (36.5 °C)  HR:  [61-83] 61  BP: (110-190)/(53-82) 144/82  Resp:  [16-20] 20  SpO2:  [92 %-95 %] 95 %  O2 Device: None (Room air)    Physical Exam  Constitutional:       General: He is not in acute distress.     Appearance: He is not ill-appearing, toxic-appearing or diaphoretic.   HENT:      Head: Normocephalic and atraumatic.     Eyes:      General: No scleral icterus.        Right eye: No discharge.         Left eye: No discharge.      Extraocular Movements: EOM normal. No nystagmus.      Conjunctiva/sclera: Conjunctivae normal.      Pupils: Pupils are equal, round, and reactive to light.       Cardiovascular:      Rate and Rhythm: Normal rate and regular rhythm.   Pulmonary:      Effort: Pulmonary effort is normal. No respiratory distress.      Breath sounds: Normal breath sounds.    Abdominal:      General: There is no distension.      Palpations: Abdomen is soft.      Tenderness: There is abdominal tenderness.     Musculoskeletal:      Cervical back: Normal range of motion and neck supple.      Right lower leg: No edema.      Left lower leg: No edema.     Skin:     General: Skin is warm and dry.      Findings: No erythema or rash.     Neurological:      Mental Status: He is alert and oriented to person, place, and time.     Psychiatric:         Speech: Speech normal.      Comments: Agitation noted.     Neurological Exam  Mental Status  Alert. Oriented to person, place, time and situation. Oriented to person, place, and time. Speech is normal. Attention and concentration are normal.    Cranial Nerves  CN II: Right normal visual field. Left normal visual field.  CN III, IV, VI: Extraocular movements intact bilaterally. No nystagmus. Normal saccades. Right ptosis. Pupils equal round and reactive to light bilaterally.  CN V:  Right: Facial sensation is normal.  Left: Facial sensation is normal on the left.  CN VII:  Right: There is no facial weakness.  Left: There is no facial weakness.  CN XII: Tongue midline without atrophy or fasciculations.    Motor  Normal muscle bulk throughout. No fasciculations present. Normal muscle tone. No abnormal involuntary movements.  Motor strength 5/5 B/L UE and LE..    Sensory  Light touch is normal in upper and lower extremities.         Lab Results: I have reviewed the following results:  Recent Results (from the past 24 hours)   CBC    Collection Time: 07/10/25  6:16 AM   Result Value Ref Range    WBC 7.59 4.31 - 10.16 Thousand/uL    RBC 4.16 3.88 - 5.62 Million/uL    Hemoglobin 11.8 (L) 12.0 - 17.0 g/dL    Hematocrit 39.5 36.5 - 49.3 %    MCV 95 82 - 98 fL    MCH 28.4 26.8 - 34.3 pg    MCHC 29.9 (L) 31.4 - 37.4 g/dL    RDW 15.4 (H) 11.6 - 15.1 %    Platelets 213 149 - 390 Thousands/uL    MPV 9.8 8.9 - 12.7 fL   Phosphorus    Collection Time: 07/10/25  6:16  AM   Result Value Ref Range    Phosphorus 4.3 (H) 2.3 - 4.1 mg/dL   Magnesium    Collection Time: 07/10/25  6:16 AM   Result Value Ref Range    Magnesium 2.1 1.9 - 2.7 mg/dL   Basic metabolic panel    Collection Time: 07/10/25  6:16 AM   Result Value Ref Range    Sodium 133 (L) 135 - 147 mmol/L    Potassium 4.5 3.5 - 5.3 mmol/L    Chloride 97 96 - 108 mmol/L    CO2 22 21 - 32 mmol/L    ANION GAP 14 (H) 4 - 13 mmol/L    BUN 29 (H) 5 - 25 mg/dL    Creatinine 6.08 (H) 0.60 - 1.30 mg/dL    Glucose 74 65 - 140 mg/dL    Calcium 8.4 8.4 - 10.2 mg/dL    eGFR 8 ml/min/1.73sq m   Ammonia    Collection Time: 07/10/25  6:16 AM   Result Value Ref Range    Ammonia 40 18 - 72 umol/L     Imaging  No new neuro imaging available for review.     VTE Pharmacologic Prophylaxis: Heparin

## 2025-07-10 NOTE — OCCUPATIONAL THERAPY NOTE
Occupational Therapy Cancellation Note       07/10/25 0952   Note Type   Note type Cancelled Session   Cancel Reasons Patient off floor/hemodialysis   Additional Comments OT orders received and chart reviewed. Attempted to see pt for OT evaluation however receiving dialysis. Will re-attempt later as time/schedule permits.     Niharika Rizzo OTR/L   [place] License # [id number]  [place] License # [id number]

## 2025-07-10 NOTE — ASSESSMENT & PLAN NOTE
Wound care nurse noted full thickness diabetic wound to left plantar foot with black eschar and foul smelling serosanguinous drainage   Wound packed and podiatry consulted

## 2025-07-10 NOTE — PHYSICAL THERAPY NOTE
"       PHYSICAL THERAPY EVALUATION/TREATMENT     07/10/25 1525   PT Last Visit   PT Visit Date 07/10/25   Note Type   Note type Evaluation   Pain Assessment   Pain Assessment Tool 0-10   Pain Score 10 - Worst Possible Pain   Pain Location/Orientation   (L>R LE, back;  pt had pain meds prior to PT)   Restrictions/Precautions   Braces or Orthoses LSO  (when HOB >45 degrees or when pt OOB)   Other Precautions Chair Alarm;Bed Alarm;Pain;Fall Risk  (HD), poor vision, leg wounds, cognition   Home Living   Type of Home   (Pt has been at [facility] for STR.)   Home Equipment Walker   Prior Function   Level of [place]   (Pt reports that he was ambulating with supervision at STR with a walker and his LSO.)   General   Additional Pertinent History Pt admitted with AMS.  Pt with improved mental status today. Pt had HD earlier today.  Pt wears an LSO for a L2 vertebral fx. Pt has been hospitalized and in STR for many months.    Family/Caregiver Present   (sister Diana)   Cognition   Overall Cognitive Status Impaired   Arousal/Participation Alert   Orientation Level Oriented to person;Oriented to place   Following Commands Follows one step commands without difficulty   Subjective   Subjective \"I want to get up\"   RLE Assessment   RLE Assessment   (ROM WFL, MMT hip 3+/5, knee 4-/5, ankle 2+/5)   LLE Assessment   LLE Assessment   (wounds distal LE that are bandaged x plantar wound open and dry;ROM WFL, MMT hip 3+/5, knee 4-/5, ankle 2+/5)   Bed Mobility   Supine to Sit 3  Moderate assistance   Sit to Supine 3  Moderate assistance   Transfers   Sit to Stand 4  Minimal assistance   Additional items   (Pt stood with UE support on a walker x 4 minutes;  Pt declined trying to walk due to feeling weak.)   Stand to Sit 4  Minimal assistance   Balance   Static Sitting Fair +   Static Standing Fair   Activity Tolerance   Activity Tolerance Patient limited by fatigue   Nurse Made Aware Dolores RAWLS aware that pt's L foot plantar wound bled some " when standing but stopped after sitting.   Assessment   Prognosis Good   Problem List Decreased strength;Impaired balance;Decreased mobility;Impaired judgement;Decreased cognition;Pain;Decreased skin integrity;Orthopedic restrictions   Assessment Pt is 65 y.o. male seen for PT evaluation s/p admit to [facility] on 7/8/2025 w/ AMS (altered mental status). PT consulted to assess pt's functional mobility and d/c needs. Order placed for PT eval and tx, w/ up as tolerated order.  Co-morbidities affecting patient's physical performance include: ESRD on HD, htn, L2 fx, CHF, anemia, retinal detachment, afib.  Personal factors affecting patient at time of initial evaluation include: ambulating with assistive device, inability to navigate level surfaces without external assistance, and compliance.         Prior to admission, patient was at STR ambulating with a walker.  Upon evaluation: Pt was able to stand with UE support on a walker.  Pt felt too weak to stand.           Please find objective findings from Physical Therapy assessment regarding body systems outlined above with impairments and limitations including weakness, impaired balance, decreased endurance, gait deviations, pain, decreased activity tolerance, decreased functional mobility tolerance, fall risk, and decreased cognition. Patient's clinical presentation is currently unstable/unpredictable as seen in patient's presentation of changing level of pain, varying levels of cognitive performance, increased fall risk, new onset of impairment of functional mobility, and new onset of weakness. Pt would benefit from continued Physical Therapy treatment to address deficits as defined above and maximize level of functional mobility.  As demonstrated by objective findings, the assigned level of complexity for this evaluation is high.    The patient's AM-[facility] Basic Mobility Inpatient Short Form Raw Score is 10. A Raw score of less than or equal to 16  "suggests the patient may benefit from discharge to post-acute rehabilitation services. Please also refer to the recommendation of the Physical Therapist for safe discharge planning.   Goals   Patient Goals \"just be able to live!\"   STG Expiration Date 07/17/25   Short Term Goal #1 1. improve bed mobility to min assist,   2. pt will sit up OOB x 2 hours in a chair,   3. pt will ambulate with a walker x 50 feet with min assist   LTG Expiration Date 07/24/25   Long Term Goal #1 1. Pt will be indendent donning and doffing his LSO and compliant with its use,   2. independent bed mobility,   3. independent transfers,   4. pt will ambulate with a walker 150 feet with supervision assist   Plan   Treatment/Interventions LE strengthening/ROM;Functional transfer training;Therapeutic exercise;Cognitive reorientation;Gait training;Bed mobility;Equipment eval/education;Patient/family training   PT Frequency 3-5x/wk   Discharge Recommendation   Rehab Resource Intensity Level, PT II (Moderate Resource Intensity)   AM-PAC Basic Mobility Inpatient   Turning in Flat Bed Without Bedrails 3   Lying on Back to Sitting on Edge of Flat Bed Without Bedrails 2   Moving Bed to Chair 3   Standing Up From Chair Using Arms 3   Walk in Room 2   Climb 3-5 Stairs With Railing 1   Basic Mobility Inpatient Raw Score 14   Basic Mobility Standardized Score 35.55   [facility] Highest Level Of Mobility   -[facility] Goal 4: Move to chair/commode   -[facility] Achieved 5: Stand (1 or more minutes)   Barthel Index   Feeding 5   Bathing 0   Grooming Score 0   Dressing Score 5   Bladder Score 0   Bowels Score 10   Toilet Use Score 5   Transfers (Bed/Chair) Score 10   Mobility (Level Surface) Score 0   Stairs Score 0   Barthel Index Score 35   End of Consult   Patient Position at End of Consult All needs within reach;Supine  (wrist restraints in place)   Licensure   [place] License Number  Niharikasarah Palmerrocco PT [id number]     "

## 2025-07-10 NOTE — ASSESSMENT & PLAN NOTE
Wt Readings from Last 3 Encounters:   07/08/25 97 kg (213 lb 13.5 oz)   06/07/25 89.7 kg (197 lb 12 oz)   05/13/25 92.6 kg (204 lb 3.2 oz)     Hx G1DD with previous EF 60-65%  BNP 3081, noncompliant with dialysis sessions recently  Continue Lasix 20 mg BID, Toprol XL 25 mg BID  CT with bilateral pleural effusions, currently stable on room air, consider thoracentesis if needed  Dialysis with ultrafiltration per nephrology

## 2025-07-10 NOTE — ASSESSMENT & PLAN NOTE
Lab Results   Component Value Date    EGFR 8 07/10/2025    EGFR 7 07/09/2025    EGFR 5 07/08/2025    CREATININE 6.08 (H) 07/10/2025    CREATININE 6.98 (H) 07/09/2025    CREATININE 8.89 (H) 07/08/2025     - Dialysis as per nephrology team.

## 2025-07-10 NOTE — ASSESSMENT & PLAN NOTE
Wt Readings from Last 3 Encounters:   07/08/25 97 kg (213 lb 13.5 oz)   06/07/25 89.7 kg (197 lb 12 oz)   05/13/25 92.6 kg (204 lb 3.2 oz)     - Management as per primary team.

## 2025-07-10 NOTE — ASSESSMENT & PLAN NOTE
Patient reported lower abdominal pain this morning, screaming out in pain to nurse; later wanted to stop dialysis due to pain  On my evaluation, patient reports pain currently resolved, abdomen soft and non-tender; does report chronic lower back pain from compression fractures  CT a/p showed interval decrease in size of subhepatic and left gluteal hematomas as well as chronic compression fractures  Continue scheduled tylenol, gabapentin, robaxin PRN, low dose oxycodone PRN  Caution with pain meds given altered mental status

## 2025-07-10 NOTE — PLAN OF CARE
Problem: Potential for Falls  Goal: Patient will remain free of falls  Description: INTERVENTIONS:  - Educate patient/family on patient safety including physical limitations  - Instruct patient to call for assistance with activity   - Consider consulting OT/PT to assist with strengthening/mobility based on AM PAC & JH-HLM score  - Consult OT/PT to assist with strengthening/mobility   - Keep Call bell within reach  - Keep bed low and locked with side rails adjusted as appropriate  - Keep care items and personal belongings within reach  - Initiate and maintain comfort rounds  - Make Fall Risk Sign visible to staff  - Offer Toileting every 2 Hours, in advance of need  - Initiate/Maintain bed/chair alarm  - Obtain necessary fall risk management equipment: bed/chair alarm  - Apply yellow socks and bracelet for high fall risk patients  - Consider moving patient to room near nurses station  Outcome: Progressing     Problem: PAIN - ADULT  Goal: Verbalizes/displays adequate comfort level or baseline comfort level  Description: Interventions:  - Encourage patient to monitor pain and request assistance  - Assess pain using appropriate pain scale  - Administer analgesics as ordered based on type and severity of pain and evaluate response  - Implement non-pharmacological measures as appropriate and evaluate response  - Consider cultural and social influences on pain and pain management  - Notify physician/advanced practitioner if interventions unsuccessful or patient reports new pain  - Educate patient/family on pain management process including their role and importance of  reporting pain   - Provide non-pharmacologic/complimentary pain relief interventions  Outcome: Progressing     Problem: INFECTION - ADULT  Goal: Absence or prevention of progression during hospitalization  Description: INTERVENTIONS:  - Assess and monitor for signs and symptoms of infection  - Monitor lab/diagnostic results  - Monitor all insertion sites,  i.e. indwelling lines, tubes, and drains  - Monitor endotracheal if appropriate and nasal secretions for changes in amount and color  - [place] appropriate cooling/warming therapies per order  - Administer medications as ordered  - Instruct and encourage patient and family to use good hand hygiene technique  - Identify and instruct in appropriate isolation precautions for identified infection/condition  Outcome: Progressing     Problem: SAFETY ADULT  Goal: Patient will remain free of falls  Description: INTERVENTIONS:  - Educate patient/family on patient safety including physical limitations  - Instruct patient to call for assistance with activity   - Consider consulting OT/PT to assist with strengthening/mobility based on AM PAC & -HLM score  - Consult OT/PT to assist with strengthening/mobility   - Keep Call bell within reach  - Keep bed low and locked with side rails adjusted as appropriate  - Keep care items and personal belongings within reach  - Initiate and maintain comfort rounds  - Make Fall Risk Sign visible to staff  - Offer Toileting every 2 Hours, in advance of need  - Initiate/Maintain bed/chair alarm  - Obtain necessary fall risk management equipment: bed/chair alarm  - Apply yellow socks and bracelet for high fall risk patients  - Consider moving patient to room near nurses station  Outcome: Progressing     Problem: DISCHARGE PLANNING  Goal: Discharge to home or other facility with appropriate resources  Description: INTERVENTIONS:  - Identify barriers to discharge w/patient and caregiver  - Arrange for needed discharge resources and transportation as appropriate  - Identify discharge learning needs (meds, wound care, etc.)  - Arrange for interpretive services to assist at discharge as needed  - Refer to Case Management Department for coordinating discharge planning if the patient needs post-hospital services based on physician/advanced practitioner order or complex needs related to functional  status, cognitive ability, or social support system  Outcome: Progressing     Problem: Knowledge Deficit  Goal: Patient/family/caregiver demonstrates understanding of disease process, treatment plan, medications, and discharge instructions  Description: Complete learning assessment and assess knowledge base.  Interventions:  - Provide teaching at level of understanding  - Provide teaching via preferred learning methods  Outcome: Progressing     Problem: METABOLIC, FLUID AND ELECTROLYTES - ADULT  Goal: Electrolytes maintained within normal limits  Description: INTERVENTIONS:  - Monitor labs and assess patient for signs and symptoms of electrolyte imbalances  - Administer electrolyte replacement as ordered  - Monitor response to electrolyte replacements, including repeat lab results as appropriate  - Instruct patient on fluid and nutrition as appropriate  Outcome: Progressing  Goal: Fluid balance maintained  Description: INTERVENTIONS:  - Monitor labs   - Monitor I/O and WT  - Instruct patient on fluid and nutrition as appropriate  - Assess for signs & symptoms of volume excess or deficit  Outcome: Progressing     Problem: RESPIRATORY - ADULT  Goal: Achieves optimal ventilation and oxygenation  Description: INTERVENTIONS:  - Assess for changes in respiratory status  - Assess for changes in mentation and behavior  - Position to facilitate oxygenation and minimize respiratory effort  - Oxygen administered by appropriate delivery if ordered  - Initiate smoking cessation education as indicated  - Encourage broncho-pulmonary hygiene including cough, deep breathe, Incentive Spirometry  - Assess the need for suctioning and aspirate as needed  - Assess and instruct to report SOB or any respiratory difficulty  - Respiratory Therapy support as indicated  Outcome: Progressing     Problem: Nutrition/Hydration-ADULT  Goal: Nutrient/Hydration intake appropriate for improving, restoring or maintaining nutritional needs  Description:  Monitor and assess patient's nutrition/hydration status for malnutrition. Collaborate with interdisciplinary team and initiate plan and interventions as ordered.  Monitor patient's weight and dietary intake as ordered or per policy. Utilize nutrition screening tool and intervene as necessary. Determine patient's food preferences and provide high-protein, high-caloric foods as appropriate.     INTERVENTIONS:  - Monitor oral intake, urinary output, labs, and treatment plans  - Assess nutrition and hydration status and recommend course of action  - Evaluate amount of meals eaten  - Assist patient with eating if necessary   - Allow adequate time for meals  - Recommend/ encourage appropriate diets, oral nutritional supplements, and vitamin/mineral supplements  - Order, calculate, and assess calorie counts as needed  - Recommend, monitor, and adjust tube feedings and TPN/PPN based on assessed needs  - Assess need for intravenous fluids  - Provide specific nutrition/hydration education as appropriate  - Include patient/family/caregiver in decisions related to nutrition  Outcome: Progressing

## 2025-07-10 NOTE — ASSESSMENT & PLAN NOTE
- Rate control as per primary team.   - Patient refuses anticoagulation due to bleeding risk. He is aware that he is at ongoing risk for stroke in setting of PAF not maintained on anticoagulation.

## 2025-07-10 NOTE — ASSESSMENT & PLAN NOTE
-Patient has a history of grade 1 diastolic dysfunction.  Prior echocardiogram with EF 60 to 65%  -euvolemic 7/10

## 2025-07-10 NOTE — ASSESSMENT & PLAN NOTE
66 yo male with ESRD on dialysis, atrial fibrillation not maintained on AC, anemia, HTN, HLD, DM, TIA who presented to the hospital with altered mental status, generalized weakness, pain, and missed/incomplete dialysis sessions. Patient was noted to be hypertensive on arrival to the hospital with electrolytes and elevated BUN and creatinine.    Neuroimaging   7/8/2025 CTH:   No acute intracranial abnormality. Chronic microangiopathic changes.   Redemonstration of posterior right globe hyperdensity that again is suggestive of retinal detachment.  7/10/2025 Carotid dopplers:   <50% stenosis noted in B/L ICA. Plaque is heterogenous and irregular.   Vertebral artery flow is antegrade B/L.   No significant subclavian artery disease.     Patient appears improved in terms of mentation, alert and oriented on exam this morning but continues with irritability/agitation. Suspect symptoms secondary to metabolic abnormalities, also suspect behavioral component. He currently refuses to have MRI brain completed under any circumstance. Low suspicion for stroke at this time given no focal neurologic deficit noted, but for completeness, will check repeat CTH to evaluate for any evolving ischemia.    Plan:   - Check repeat CTH.   - Continue ASA 81 mg QD.   - Continue atorvastatin 40 mg QPM.   - Delirium precautions.   - Avoid CNS altering medication options.   - Monitor exam and notify with changes.

## 2025-07-10 NOTE — PROGRESS NOTES
Progress Note - Nephrology   Name: Naresh Carpio 65 y.o. male I MRN: [id number]  Unit/Bed#: 4 [place] 414-01 I Date of Admission: 7/8/2025   Date of Service: 7/10/2025 I Hospital Day: 1    Assessment & Plan  ESRD (end stage renal disease) (HCC)  -Outpatient unit [facility] due to recent rehab admission.  Was prior at [facility]  - Access-right IJ PermCath  - Prior estimated dry weight on discharge in June 2025 was 89.5 kg but dry weight was being challenged with dialysis  - Noted patient only had partial treatment last Thursday and only had 35 minutes of treatment on Saturday.  Was sent to the hospital on July 8 with erratic behavior  - Outpatient schedule-TTS  - Patient has been noncompliant with outpatient dialysis per my discussion with outpatient dialysis nurse-for at least the past 2 weeks he has been shortening treatment to 2-hour treatments and this past Saturday only did 30-minute treatments  - 7/8-patient completed dialysis treatment for 3-hour treatment with lower flows as he has been shortening many treatments  - 7/9-plan for short dialysis treatment for clearance  - 7/10 - HD for regular treatment. K improved with HD yesterday. Na improving with UF with HD.     Plan  - Workup for encephalopathy in progress by primary team-etiology is less likely uremia alone but pt does appear slightly improved in terms of orientation this AM. But then when I went back to evaluate pt on HD later this AM, he had no recollection of AM visit, or who I was and was agitated again  - noted repeat CT head, carotid u/s plans per neuro this AM  - HD today regular treatment - seen on dialysis - Na 138, bicarb 35, F180, , time 3.5 hr. Pt initially declining to remain on HD but then states will remain on HD if I can review with primary team regarding his pain control. I reached out to primary and deferring pain control to primary team  - Retinal detachment concern on CT head-further plans per primary  team  Secondary hyperparathyroidism (HCC)  -Home regimen includes sevelamer--> phos improving 7/10  At risk for acid-base imbalance  -Bicarbonate appropriate  Electrolyte imbalance risk  -Potassium 5.5.  improving to 4.5 with dialysis 7/10  HTN (hypertension)  -Prior was on nifedipine 30 mg daily and furosemide 20 mg twice daily which was adjusted in June 2025 to metoprolol 25 mg twice daily, nifedipine 30 mg daily  - To note, patient was noted to have bradycardia as outpatient?  Will need to confirm if patient is on beta-blocker.  Currently heart rate is okay  CHF (congestive heart failure) (HCC)  -Patient has a history of grade 1 diastolic dysfunction.  Prior echocardiogram with EF 60 to 65%  -euvolemic 7/10          Anemia  -Hold for now given mentation changes  - Is on Mircera and Venofer as outpatient  Agitation  Etiology unclear  - CT head  unrevealing for acute issue  - Urinalysis unrevealing  - There may be a uremic component as the patient has had poor dialysis over the past at least 2 weeks  - neuro on board  - repeat CT head -   - repeat carotid u/s -   AMS (altered mental status)  -Evaluation in progress per primary team  Paroxysmal atrial fibrillation (HCC)  Per primary team    I have reviewed the nephrology recommendations including HD today, with primary team AP, and we are in agreement with renal plan including the information outlined above.     Subjective   Brief History of Admission - arthur Carpio is a 65 y.o. male with past medical history of ESRD on hemodialysis at [facility], recent hospitalization in June 2025 with respiratory failure in the setting of volume overload/COVID-19 infection, hypertension, retinopathy, diastolic heart failure, paroxysmal A-fib, prior history of retroperitoneal hematoma in April after a fall and underwent IR embolization of distal right deep circumflex iliac artery and superior right gluteal artery, who was admitted to [facility] 7/8 after  presenting with agitation and confusion. A renal consultation is requested today for assistance in the management of ESRD.  Patient is not providing adequate history.  I called [facility] dialysis unit and spoke with the nurse.  Per nursing team, patient was aggressive and agitated and confused and was sent to the ER.  This has been an ongoing issue over the past 1 to 2 weeks.  He has been shortening his diet treatments to approximately 2-hour treatments for the past 2 weeks and this past Saturday he only did 30 minutes of treatment.  The patient states that he is agreeable to the dialysis but he has no answer any other questions..     Past 24 hours - this AM, pt was oriented to place, name which was improvement. But then later this AM pt was confused again and agitated again.     Objective :  Temp:  [97.5 °F (36.4 °C)-98.8 °F (37.1 °C)] 97.5 °F (36.4 °C)  HR:  [59-83] 62  BP: (110-190)/(48-84) 135/84  Resp:  [16-20] 18  SpO2:  [91 %-95 %] 91 %  O2 Device: None (Room air)    Current Weight: Weight - Scale: 97 kg (213 lb 13.5 oz)  First Weight: Weight - Scale: 97 kg (213 lb 13.5 oz)  I/O         07/08 0701  07/09 0700 07/09 0701  07/10 0700 07/10 0701  07/11 0700    P.O.  60     I.V. (mL/kg) 500 (5.2) 500 (5.2) 200 (2.1)    Total Intake(mL/kg) 500 (5.2) 560 (5.8) 200 (2.1)    Urine (mL/kg/hr) 250 500 (0.2) 500 (1.6)    Other 2500 1500     Total Output 2750 2000 500    Net -2250 -1440 -300           Unmeasured Urine Occurrence  2 x           Physical Exam  General: NAD  Skin: no rash  Eyes: anicteric sclera  ENT: dry  mucous membrane and poor dentition  Neck: supple  Chest: CTA b/l, no ronchii, no wheeze, no rubs, no rales  CVS: s1s2, no murmur, no gallop, no rub  Abdomen: soft, nontender, nl sounds  Extremities: no edema LE b/l, LE wound wrapped, R IJ TDC  : no gaitan  Neuro: confused  Psych: agitated, confused    Medications:  Current Medications[1]      Lab Results: I have reviewed the following  "results:  Results from last 7 days   Lab Units 07/10/25  0616 07/09/25  0707 07/08/25  1144   WBC Thousand/uL 7.59 6.85 5.83   HEMOGLOBIN g/dL 11.8* 11.4* 10.3*   HEMATOCRIT % 39.5 37.9 34.5*   PLATELETS Thousands/uL 213 202 198   POTASSIUM mmol/L 4.5 5.5* 5.7*   CHLORIDE mmol/L 97 96 94*   CO2 mmol/L 22 25 28   BUN mg/dL 29* 35* 54*   CREATININE mg/dL 6.08* 6.98* 8.89*   CALCIUM mg/dL 8.4 8.7 9.1   MAGNESIUM mg/dL 2.1 2.2  --    PHOSPHORUS mg/dL 4.3* 4.4*  --    ALBUMIN g/dL  --   --  3.6       Administrative Statements     Portions of the record may have been created with voice recognition software. Occasional wrong word or \"sound a like\" substitutions may have occurred due to the inherent limitations of voice recognition software. Read the chart carefully and recognize, using context, where substitutions have occurred.If you have any questions, please contact the dictating provider.       [1]   Current Facility-Administered Medications:     acetaminophen (TYLENOL) tablet 975 mg, 975 mg, Oral, Q8H KEMAL, Nallely Pisano PA-C    albuterol inhalation solution 2.5 mg, 2.5 mg, Nebulization, Q6H PRN, Nirav Brcue MD    allopurinol (ZYLOPRIM) tablet 100 mg, 100 mg, Oral, Daily, GRANT Valdivia, 100 mg at 07/09/25 1213    aspirin chewable tablet 81 mg, 81 mg, Oral, Daily, GRANT Valdivia, 81 mg at 07/09/25 1214    atorvastatin (LIPITOR) tablet 80 mg, 80 mg, Oral, Daily With Dinner, GRANT Valdivia, 80 mg at 07/09/25 1612    famotidine (PEPCID) tablet 20 mg, 20 mg, Oral, HS, GRANT Valdivia, 20 mg at 07/09/25 2155    furosemide (LASIX) tablet 20 mg, 20 mg, Oral, BID, GRANT Valdivia, 20 mg at 07/09/25 1706    gabapentin (NEURONTIN) capsule 100 mg, 100 mg, Oral, Once per day on Monday Wednesday Friday, Alisson Morris, GRANT, 100 mg at 07/09/25 1215    heparin (porcine) subcutaneous injection 5,000 Units, 5,000 Units, Subcutaneous, Q8H KEMAL, " GRANT Valdivia, 5,000 Units at 07/10/25 0556    hydrALAZINE (APRESOLINE) injection 5 mg, 5 mg, Intravenous, Q6H PRN, Nallely Pisano PA-C, 5 mg at 07/09/25 1604    lidocaine (LIDODERM) 5 % patch 1 patch, 1 patch, Topical, Daily, GRANT Valdivia, 1 patch at 07/09/25 0905    melatonin tablet 6 mg, 6 mg, Oral, HS, GRANT Valdivia, 6 mg at 07/09/25 2155    methocarbamol (ROBAXIN) tablet 500 mg, 500 mg, Oral, TID PRN, GRANT Valdivia, 500 mg at 07/09/25 1705    metoprolol succinate (TOPROL-XL) 24 hr tablet 25 mg, 25 mg, Oral, Q12H KEMAL, GRANT Valdivia, 25 mg at 07/09/25 2155    NIFEdipine (PROCARDIA XL) 24 hr tablet 30 mg, 30 mg, Oral, After Dinner, GRANT Valdivia, 30 mg at 07/09/25 1706    oxyCODONE (ROXICODONE) split tablet 2.5 mg, 2.5 mg, Oral, Q8H PRN, GRANT Valdivia, 2.5 mg at 07/10/25 0559    senna (SENOKOT) tablet 8.6 mg, 8.6 mg, Oral, Daily With Lunch, GRANT Valdivia, 8.6 mg at 07/09/25 1214    sevelamer (RENAGEL) tablet 1,600 mg, 1,600 mg, Oral, TID With Meals, GRANT Valdivia

## 2025-07-10 NOTE — ASSESSMENT & PLAN NOTE
-Outpatient unit [facility] TTS due to recent rehab admission.  Was prior at [facility]  - Access-right IJ PermCath  - Prior estimated dry weight on discharge in June 2025 was 89.5 kg but dry weight was being challenged with dialysis  - Noted patient only had partial treatment last Thursday and only had 35 minutes of treatment on Saturday.  Was sent to the hospital on July 8 with erratic behavior  - Outpatient schedule-TTS  - Patient has been noncompliant with outpatient dialysis per my discussion with outpatient dialysis nurse-for at least the past 2 weeks he has been shortening treatment to 2-hour treatments and this past Saturday only did 30-minute treatments  - 7/8-patient completed dialysis treatment for 3-hour treatment with lower flows as he has been shortening many treatments  - 7/9-plan for short dialysis treatment for clearance  - 7/10 - HD for regular treatment. K improved with HD yesterday. Na improving with UF with HD.     Plan  - Workup for encephalopathy in progress by primary team-etiology is less likely uremia alone but pt does appear slightly improved in terms of orientation this AM. But then when I went back to evaluate pt on HD later this AM, he had no recollection of AM visit, or who I was and was agitated again  - noted repeat CT head, carotid u/s plans per neuro this AM  - HD today regular treatment - seen on dialysis - Na 138, bicarb 35, F180, , time 3.5 hr. Pt initially declining to remain on HD but then states will remain on HD if I can review with primary team regarding his pain control. I reached out to primary and deferring pain control to primary team  - Retinal detachment concern on CT head-further plans per primary team

## 2025-07-10 NOTE — DISCHARGE INSTR - OTHER ORDERS
"Wound Care Plan:  1-Apply VASHE soaked gauze to left medial foot, left dorsal foot, left 2nd toe and left 1st toe wounds x 5 minutes and pat dry. Apply Betadine to wounds, gauze or ABD, rolled gauze and tape. Change daily & as needed for soilage/dislodgement.  2-Apply VASHE soaked gauze to left plantar foot wound x 5 minutes and pat dry. Pack wound with 1/4\" Iodoform packing, apply ABD, rolled gauze and tape. Change daily & as needed for soilage/dislodgement. If order changed by Podiatry, please discontinue this order and follow new order.  3-Apply VASHE soaked gauze to bilateral lower leg wounds x 5 minutes and pat dry. Apply Dermagran in a single layer cut to size of wounds, ABD, rolled gauze and tape. Change 3x/week and as needed for soilage/dislodgement.   4-Apply Prevent barrier cream or equivalent to bilateral sacrobuttocks 2x/day and as needed and to heels daily with dressing changes.  5-Heel lift boots to be worn to bilateral heels at all times in bed and after transfer to reclining chair if able. If patient unable to tolerate, must float heels on 2 pillows to offload pressure so heels are not in contact with mattress or pillows. Must remove boots before attempting to stand or walk patient.  6-Use pressure redistribution cushion in chair when out of bed if able. Avoid prolonged sitting.  7-Moisturize skin daily with skin nourishing cream.  8-Turn/reposition every 2 hours in bed and every 15-20 minutes when out of bed to chair for pressure re-distribution on skin.   9-Follow up with Podiatry as directed.  "

## 2025-07-10 NOTE — WOUND OSTOMY CARE
"Progress Note - Wound   Naresh Carpio 65 y.o. male MRN: [id number]  Unit/Bed#: [address] [place] [address] Encounter: [id number]        Assessment:   This is a 65 year old male patient admitted on 7/8/25 with altered mental status. Patient was extremely lethargic and receiving completion of hemodialysis treatment. He was oriented x 2 and agreeable to have wound visit done. He was turned and repositioned with assist of 2 persons. Patient voids in urinal with episodes of incontinence with no record of BM.    Assessment Findings:  1-Multiple full thickness traumatic wounds to left medial foot, left dorsal foot, left 2nd toe and left 1st toe with dry eschar. Orders are in place for wound care until patient seen by Podiatry.  2-Full thickness diabetic wound to left plantar foot with black eschar and foul smelling serosanguinous drainage. There is undermining circumferentially 3 cm at deepest depth. Orders are in place for wound care until patient seen by Podiatry.  3-Full thickness traumatic wounds to bilateral lower legs - orders are in place for wound care and for prevention.  4-Bilateral heels and sacrobuttocks are intact - orders are in place for skin care and for prevention.    Wound Care Plan:  1-Apply VASHE soaked gauze to left medial foot, left dorsal foot, left 2nd toe and left 1st toe wounds x 5 minutes and pat dry. Apply Betadine to wounds, gauze or ABD, rolled gauze and tape. Change daily & prn soilage/dislodgement.  2-Apply VASHE soaked gauze to left plantar foot wound x 5 minutes and pat dry. Pack wound with 1/4\" Iodoform packing, apply ABD, rolled gauze and tape. Change daily & prn soilage/dislodgement. If order changed by Podiatry, please discontinue this order and follow new order.  3-Apply VASHE soaked gauze to bilateral lower leg wounds x 5 minutes and pat dry. Apply Dermagran in a single layer cut to size of wounds, ABD, rolled gauze and tape. Change every other day & prn soilage/dislodgement.   4-Apply Prevent " "barrier cream to bilateral sacrobuttocks BID & PRN and to heels daily with dressing changes.  5-Heel lift boots to be worn to bilateral heels at all times in bed and after transfer to reclining chair if able. If patient unable to tolerate,must float heels on 2 pillows to offload pressure so heels are not in contact with mattress or pillows.  6-Ehob pressure redistribution cushion in chair when out of bed if able. Avoid prolonged sitting.  7-Moisturize skin daily with skin nourishing cream.  8-Turn/reposition q2h or when medically stable for pressure re-distribution on skin.     Wound 06/04/25 Diabetic Ulcer Plantar Left (Active)   Wound Image   07/09/25 1615   Wound Description Black;Eschar 07/09/25 1615   Non-staged Wound Description Full thickness 07/09/25 1615   Wound Length (cm) 1 cm 07/09/25 1615   Wound Width (cm) 0.8 cm 07/09/25 1615   Wound Depth (cm) 0.3 cm 07/09/25 1615   Wound Surface Area (cm^2) 0.63 cm^2 07/09/25 1615   Wound Volume (cm^3) 0.126 cm^3 07/09/25 1615   Calculated Wound Volume (cm^3) 0.24 cm^3 07/09/25 1615   Change in Wound Size % -380 07/09/25 1615   Number of underminings 1 07/09/25 1615   Undermining 1 0.3 07/09/25 1615   Undermining 1 is depth extending from 12-12:00 07/09/25 1615   Drainage Amount Small 07/09/25 1615   Drainage Description Foul smelling;Serosanguineous 07/09/25 1615   Liliana-wound Assessment Dry;Callus 07/09/25 1615   Treatments Cleansed 07/09/25 1615   Dressing 1/4\" Iodoform packing;ABD;Dry dressing 07/09/25 1615   Wound packed? Yes 07/09/25 1615   Packing- # inserted 1 07/09/25 1615   Packing- # removed 0 07/09/25 1615   Dressing Changed New 07/09/25 1615   Dressing Status Clean;Dry;Intact 07/09/25 1615       Wound 07/08/25 Traumatic Leg Left;Anterior (Active)   Wound Image   07/09/25 1610   Wound Description Pink;Granulation tissue;Yellow;Slough 07/09/25 1610   Non-staged Wound Description Full thickness 07/09/25 1610   Wound Length (cm) 2.2 cm 07/09/25 1610   Wound " Width (cm) 1 cm 07/09/25 1610   Wound Depth (cm) 0.1 cm 07/09/25 1610   Wound Surface Area (cm^2) 1.73 cm^2 07/09/25 1610   Wound Volume (cm^3) 0.115 cm^3 07/09/25 1610   Calculated Wound Volume (cm^3) 0.22 cm^3 07/09/25 1610   Drainage Amount Small 07/09/25 1610   Drainage Description Serosanguineous 07/09/25 1610   Liliana-wound Assessment Intact;Maceration 07/09/25 1610   Treatments Cleansed 07/09/25 1610   Dressing Dermagran gauze;ABD;Dry dressing 07/09/25 1610   Dressing Changed New 07/09/25 1610   Dressing Status Clean;Dry;Intact 07/09/25 1610       Wound 07/08/25 Traumatic Leg Right;Anterior (Active)   Wound Image   07/09/25 1609   Wound Description Granulation tissue;Beefy red 07/09/25 1609   Non-staged Wound Description Full thickness 07/09/25 1609   Wound Length (cm) 2.9 cm 07/09/25 1609   Wound Width (cm) 2 cm 07/09/25 1609   Wound Depth (cm) 0.1 cm 07/09/25 1609   Wound Surface Area (cm^2) 4.56 cm^2 07/09/25 1609   Wound Volume (cm^3) 0.304 cm^3 07/09/25 1609   Calculated Wound Volume (cm^3) 0.58 cm^3 07/09/25 1609   Drainage Amount Small 07/09/25 1609   Drainage Description Serosanguineous 07/09/25 1609   Liliana-wound Assessment Intact 07/09/25 1609   Treatments Cleansed 07/09/25 1609   Dressing Dermagran gauze;ABD;Dry dressing 07/09/25 1609   Dressing Changed New 07/09/25 1609   Dressing Status Clean;Dry;Intact 07/09/25 1609       Wound 07/09/25 Traumatic Foot Left;Medial (Active)   Wound Image   07/09/25 1620   Wound Description Dry;Black;Eschar;Slough;Yellow 07/09/25 1620   Non-staged Wound Description Full thickness 07/09/25 1620   Wound Length (cm) 0.5 cm 07/09/25 1620   Wound Width (cm) 0.5 cm 07/09/25 1620   Wound Depth (cm) 0.1 cm 07/09/25 1620   Wound Surface Area (cm^2) 0.2 cm^2 07/09/25 1620   Wound Volume (cm^3) 0.013 cm^3 07/09/25 1620   Calculated Wound Volume (cm^3) 0.03 cm^3 07/09/25 1620   Drainage Amount None 07/09/25 1620   Liliana-wound Assessment Intact 07/09/25 1620   Treatments Cleansed  07/09/25 1620   Dressing ABD;Dry dressing;Betadine 07/09/25 1620   Dressing Changed New 07/09/25 1620   Dressing Status Clean;Dry;Intact 07/09/25 1620       Wound 07/09/25 Traumatic Foot Left;Dorsal (Active)   Wound Image   07/09/25 1614   Wound Description Dry;Brown;Eschar 07/09/25 1614   Wound Length (cm) 2.5 cm 07/09/25 1614   Wound Width (cm) 3 cm 07/09/25 1614   Wound Depth (cm) 0.1 cm 07/09/25 1614   Wound Surface Area (cm^2) 5.89 cm^2 07/09/25 1614   Wound Volume (cm^3) 0.393 cm^3 07/09/25 1614   Calculated Wound Volume (cm^3) 0.75 cm^3 07/09/25 1614   Drainage Amount None 07/09/25 1614   Liliana-wound Assessment Intact;Dry;Scaly 07/09/25 1614   Treatments Cleansed 07/09/25 1614   Dressing ABD;Dry dressing;Betadine 07/09/25 1614   Dressing Changed New 07/09/25 1614   Dressing Status Clean;Dry;Intact 07/09/25 1614       Wound 07/09/25 Traumatic Toe D2, second Anterior;Left (Active)   Wound Image   07/09/25 1614   Wound Description Black;Eschar 07/09/25 1614   Non-staged Wound Description Full thickness 07/09/25 1614   Wound Length (cm) 0.5 cm 07/09/25 1614   Wound Width (cm) 0.7 cm 07/09/25 1614   Wound Depth (cm) 0.1 cm 07/09/25 1614   Wound Surface Area (cm^2) 0.27 cm^2 07/09/25 1614   Wound Volume (cm^3) 0.018 cm^3 07/09/25 1614   Calculated Wound Volume (cm^3) 0.04 cm^3 07/09/25 1614   Drainage Amount None 07/09/25 1614   Liliana-wound Assessment Intact 07/09/25 1614   Treatments Cleansed 07/09/25 1614   Dressing Dry dressing;Gauze;Betadine 07/09/25 1614   Dressing Changed New 07/09/25 1614   Dressing Status Clean;Dry;Intact 07/09/25 1614       Wound 07/09/25 Traumatic Toe D1, great Anterior;Left (Active)   Wound Image   07/09/25 1613   Wound Description Dry;Black;Brown;Eschar 07/09/25 1613   Non-staged Wound Description Full thickness 07/09/25 1613   Wound Length (cm) 0.5 cm 07/09/25 1613   Wound Width (cm) 1 cm 07/09/25 1613   Wound Depth (cm) 0.1 cm 07/09/25 1613   Wound Surface Area (cm^2) 0.39 cm^2 07/09/25  1613   Wound Volume (cm^3) 0.026 cm^3 07/09/25 1613   Calculated Wound Volume (cm^3) 0.05 cm^3 07/09/25 1613   Drainage Amount None 07/09/25 1613   Liliana-wound Assessment Intact 07/09/25 1613   Treatments Cleansed 07/09/25 1613   Dressing Gauze;Dry dressing;Betadine 07/09/25 1613   Dressing Status Clean;Dry;Intact 07/09/25 1613     Discussed assessment findings, and plan of care/recommendations with Patricia RAWLS.    Wound care will follow along with patient weekly, please call text with questions and concerns    Recommendations written as orders.  Love Andrew MSN, RN, CWON

## 2025-07-10 NOTE — PLAN OF CARE
Problem: PHYSICAL THERAPY ADULT  Goal: Performs mobility at highest level of function for planned discharge setting.  See evaluation for individualized goals.  Description: Treatment/Interventions: LE strengthening/ROM, Functional transfer training, Therapeutic exercise, Cognitive reorientation, Gait training, Bed mobility, Equipment eval/education, Patient/family training          See flowsheet documentation for full assessment, interventions and recommendations.  Note: Prognosis: Good  Problem List: Decreased strength, Impaired balance, Decreased mobility, Impaired judgement, Decreased cognition, Pain, Decreased skin integrity, Orthopedic restrictions  Assessment: Pt is 65 y.o. male seen for PT evaluation s/p admit to [facility] on 7/8/2025 w/ AMS (altered mental status). PT consulted to assess pt's functional mobility and d/c needs. Order placed for PT eval and tx, w/ up as tolerated order.  Co-morbidities affecting patient's physical performance include: ESRD on HD, htn, L2 fx, CHF, anemia, retinal detachment, afib.  Personal factors affecting patient at time of initial evaluation include: ambulating with assistive device, inability to navigate level surfaces without external assistance, and compliance.     Prior to admission, patient was at STR ambulating with a walker.  Upon evaluation: Pt was able to stand with UE support on a walker.  Pt felt too weak to stand.       Please find objective findings from Physical Therapy assessment regarding body systems outlined above with impairments and limitations including weakness, impaired balance, decreased endurance, gait deviations, pain, decreased activity tolerance, decreased functional mobility tolerance, fall risk, and decreased cognition. Patient's clinical presentation is currently unstable/unpredictable as seen in patient's presentation of changing level of pain, varying levels of cognitive performance, increased fall risk, new onset of  impairment of functional mobility, and new onset of weakness. Pt would benefit from continued Physical Therapy treatment to address deficits as defined above and maximize level of functional mobility.     As demonstrated by objective findings, the assigned level of complexity for this evaluation is high.The patient's AM-[facility] Basic Mobility Inpatient Short Form Raw Score is 10. A Raw score of less than or equal to 16 suggests the patient may benefit from discharge to post-acute rehabilitation services. Please also refer to the recommendation of the Physical Therapist for safe discharge planning.        Rehab Resource Intensity Level, PT: II (Moderate Resource Intensity)    See flowsheet documentation for full assessment.

## 2025-07-10 NOTE — ASSESSMENT & PLAN NOTE
- Goal normotension.   - Management as per primary team.   Pt left with EMS to facility, no IV in place and tele off.   VSS stable

## 2025-07-10 NOTE — ASSESSMENT & PLAN NOTE
CT head: Redemonstration of posterior right globe hyperdensity that again is suggestive of retinal detachment.   Patient and sister report he has surgery scheduled with ophthalmologist later this month in [place]

## 2025-07-10 NOTE — PLAN OF CARE
Patient presents for a 3.5 hour HD session on a 3K2.5Ca bath for a serum potassium of 4.5 mmol/L drawn today with a net UF goal of 1 L keeping SBP >110 per discussion with Dr. Landa.    Post-Dialysis RN Treatment Note    Blood Pressure:  Pre 136/48 mm/Hg  Post 137/58 mmHg   EDW:  94.0 kg    Weight:  Pre 90.0 kg   Post 89.0 kg   Mode of weight measurement: Bed Scale   Volume Removed:  1000 ml    Treatment duration: 210 minutes    NS given:  No    Treatment shortened No   Medications given during Rx: 2.5 mg oxycodone   Estimated Kt/V:  Not Applicable   Access type: Permacath/TDC   Access Issues: No    Report called to primary nurse:   Yes, Dolores Dang RN     Problem: METABOLIC, FLUID AND ELECTROLYTES - ADULT  Goal: Electrolytes maintained within normal limits  Description: INTERVENTIONS:  - Monitor labs and assess patient for signs and symptoms of electrolyte imbalances  - Administer electrolyte replacement as ordered  - Monitor response to electrolyte replacements, including repeat lab results as appropriate  - Instruct patient on fluid and nutrition as appropriate  Outcome: Progressing  Goal: Fluid balance maintained  Description: INTERVENTIONS:  - Monitor labs   - Monitor I/O and WT  - Instruct patient on fluid and nutrition as appropriate  - Assess for signs & symptoms of volume excess or deficit  Outcome: Progressing

## 2025-07-11 ENCOUNTER — APPOINTMENT (INPATIENT)
Dept: RADIOLOGY | Facility: HOSPITAL | Age: 65
DRG: 698 | End: 2025-07-11
Payer: MEDICARE

## 2025-07-11 LAB
ANION GAP SERPL CALCULATED.3IONS-SCNC: 12 MMOL/L (ref 4–13)
BUN SERPL-MCNC: 18 MG/DL (ref 5–25)
CALCIUM SERPL-MCNC: 8.5 MG/DL (ref 8.4–10.2)
CHLORIDE SERPL-SCNC: 98 MMOL/L (ref 96–108)
CO2 SERPL-SCNC: 24 MMOL/L (ref 21–32)
CREAT SERPL-MCNC: 4.4 MG/DL (ref 0.6–1.3)
ERYTHROCYTE [DISTWIDTH] IN BLOOD BY AUTOMATED COUNT: 15.3 % (ref 11.6–15.1)
GFR SERPL CREATININE-BSD FRML MDRD: 13 ML/MIN/1.73SQ M
GLUCOSE SERPL-MCNC: 74 MG/DL (ref 65–140)
HCT VFR BLD AUTO: 40.1 % (ref 36.5–49.3)
HGB BLD-MCNC: 12.1 G/DL (ref 12–17)
MCH RBC QN AUTO: 28.3 PG (ref 26.8–34.3)
MCHC RBC AUTO-ENTMCNC: 30.2 G/DL (ref 31.4–37.4)
MCV RBC AUTO: 94 FL (ref 82–98)
PLATELET # BLD AUTO: 212 THOUSANDS/UL (ref 149–390)
PMV BLD AUTO: 9.1 FL (ref 8.9–12.7)
POTASSIUM SERPL-SCNC: 4.2 MMOL/L (ref 3.5–5.3)
RBC # BLD AUTO: 4.28 MILLION/UL (ref 3.88–5.62)
SODIUM SERPL-SCNC: 134 MMOL/L (ref 135–147)
WBC # BLD AUTO: 5.12 THOUSAND/UL (ref 4.31–10.16)

## 2025-07-11 PROCEDURE — 97167 OT EVAL HIGH COMPLEX 60 MIN: CPT

## 2025-07-11 PROCEDURE — 99232 SBSQ HOSP IP/OBS MODERATE 35: CPT | Performed by: INTERNAL MEDICINE

## 2025-07-11 PROCEDURE — 80048 BASIC METABOLIC PNL TOTAL CA: CPT

## 2025-07-11 PROCEDURE — 99232 SBSQ HOSP IP/OBS MODERATE 35: CPT | Performed by: NURSE PRACTITIONER

## 2025-07-11 PROCEDURE — 97535 SELF CARE MNGMENT TRAINING: CPT

## 2025-07-11 PROCEDURE — 99222 1ST HOSP IP/OBS MODERATE 55: CPT | Performed by: STUDENT IN AN ORGANIZED HEALTH CARE EDUCATION/TRAINING PROGRAM

## 2025-07-11 PROCEDURE — 94760 N-INVAS EAR/PLS OXIMETRY 1: CPT

## 2025-07-11 PROCEDURE — 73630 X-RAY EXAM OF FOOT: CPT

## 2025-07-11 PROCEDURE — 85027 COMPLETE CBC AUTOMATED: CPT

## 2025-07-11 RX ADMIN — METOPROLOL SUCCINATE 25 MG: 25 TABLET, EXTENDED RELEASE ORAL at 21:29

## 2025-07-11 RX ADMIN — METHOCARBAMOL 500 MG: 500 TABLET ORAL at 02:13

## 2025-07-11 RX ADMIN — MUPIROCIN 1 APPLICATION: 20 OINTMENT TOPICAL at 21:30

## 2025-07-11 RX ADMIN — FUROSEMIDE 20 MG: 20 TABLET ORAL at 08:54

## 2025-07-11 RX ADMIN — Medication 2.5 MG: at 11:40

## 2025-07-11 RX ADMIN — ALLOPURINOL 100 MG: 100 TABLET ORAL at 08:54

## 2025-07-11 RX ADMIN — SENNOSIDES 8.6 MG: 8.6 TABLET, FILM COATED ORAL at 11:40

## 2025-07-11 RX ADMIN — HEPARIN SODIUM 5000 UNITS: 5000 INJECTION INTRAVENOUS; SUBCUTANEOUS at 15:49

## 2025-07-11 RX ADMIN — HEPARIN SODIUM 5000 UNITS: 5000 INJECTION INTRAVENOUS; SUBCUTANEOUS at 05:31

## 2025-07-11 RX ADMIN — SEVELAMER HYDROCHLORIDE 1600 MG: 800 TABLET ORAL at 11:40

## 2025-07-11 RX ADMIN — FAMOTIDINE 20 MG: 20 TABLET, FILM COATED ORAL at 21:29

## 2025-07-11 RX ADMIN — NIFEDIPINE 30 MG: 30 TABLET, EXTENDED RELEASE ORAL at 18:41

## 2025-07-11 RX ADMIN — METHOCARBAMOL 500 MG: 500 TABLET ORAL at 15:49

## 2025-07-11 RX ADMIN — SEVELAMER HYDROCHLORIDE 1600 MG: 800 TABLET ORAL at 08:58

## 2025-07-11 RX ADMIN — ATORVASTATIN CALCIUM 80 MG: 80 TABLET, FILM COATED ORAL at 15:49

## 2025-07-11 RX ADMIN — METOPROLOL SUCCINATE 25 MG: 25 TABLET, EXTENDED RELEASE ORAL at 08:54

## 2025-07-11 RX ADMIN — GABAPENTIN 100 MG: 100 CAPSULE ORAL at 08:54

## 2025-07-11 RX ADMIN — ACETAMINOPHEN 975 MG: 325 TABLET, FILM COATED ORAL at 05:31

## 2025-07-11 RX ADMIN — Medication 6 MG: at 21:29

## 2025-07-11 RX ADMIN — ACETAMINOPHEN 975 MG: 325 TABLET, FILM COATED ORAL at 15:49

## 2025-07-11 RX ADMIN — LIDOCAINE 1 PATCH: 50 PATCH CUTANEOUS at 08:54

## 2025-07-11 RX ADMIN — FUROSEMIDE 20 MG: 20 TABLET ORAL at 18:41

## 2025-07-11 RX ADMIN — Medication 2.5 MG: at 18:41

## 2025-07-11 RX ADMIN — ACETAMINOPHEN 975 MG: 325 TABLET, FILM COATED ORAL at 21:29

## 2025-07-11 RX ADMIN — HEPARIN SODIUM 5000 UNITS: 5000 INJECTION INTRAVENOUS; SUBCUTANEOUS at 21:29

## 2025-07-11 RX ADMIN — SEVELAMER HYDROCHLORIDE 1600 MG: 800 TABLET ORAL at 15:49

## 2025-07-11 RX ADMIN — ASPIRIN 81 MG: 81 TABLET, CHEWABLE ORAL at 08:54

## 2025-07-11 RX ADMIN — MUPIROCIN 1 APPLICATION: 20 OINTMENT TOPICAL at 08:54

## 2025-07-11 NOTE — ASSESSMENT & PLAN NOTE
-Outpatient unit [facility] TTS due to recent rehab admission.  Was prior at [facility]  - Access-right IJ PermCath  - Prior estimated dry weight on discharge in June 2025 was 89.5 kg but dry weight was being challenged with dialysis  - Noted patient only had partial treatment last Thursday and only had 35 minutes of treatment on Saturday.  Was sent to the hospital on July 8 with erratic behavior  - Outpatient schedule-TTS  - Patient has been noncompliant with outpatient dialysis per my discussion with outpatient dialysis nurse-for at least the past 2 weeks he has been shortening treatment to 2-hour treatments and this past Saturday only did 30-minute treatments  - 7/8-patient completed dialysis treatment for 3-hour treatment with lower flows as he has been shortening many treatments  - 7/9-plan for short dialysis treatment for clearance  - 7/10 - HD for regular treatment. K improved with HD yesterday. Na improving with UF with HD.  - 7/11-patient's mentation has improved and he is back to his baseline.  Alert and oriented x 3.  No longer agitated.  Sodium, potassium, bicarbonate appropriate.  Hemoglobin at goal.    Plan  - Dialysis tomorrow Saturday  - Patient's mentation has improved and he is back to baseline  - Retinal detachment concern on CT head-patient states he follows with ophthalmology as outpatient.  Reviewed with primary team advanced practitioner

## 2025-07-11 NOTE — ASSESSMENT & PLAN NOTE
Lab Results   Component Value Date    EGFR 13 07/11/2025    EGFR 8 07/10/2025    EGFR 7 07/09/2025    CREATININE 4.40 (H) 07/11/2025    CREATININE 6.08 (H) 07/10/2025    CREATININE 6.98 (H) 07/09/2025   For past two weeks patient has been shortening dialysis sessions  TTS schedule  Nephrology consulted for dialysis management  Question patient as to why he is shortening his dialysis treatments he indicated that the diabetic neuropathy in his feet begin to hurt tremendously when he is not able to put weight on them and that is why he has been cutting the session short.  We discussed the possibility of perhaps him getting a foot massager to utilize while having dialysis to mitigate the neuropathy pain, he is agreeable to looking into this

## 2025-07-11 NOTE — PLAN OF CARE
Problem: Potential for Falls  Goal: Patient will remain free of falls  Description: INTERVENTIONS:  - Educate patient/family on patient safety including physical limitations  - Instruct patient to call for assistance with activity   - Consider consulting OT/PT to assist with strengthening/mobility based on AM PAC & JH-HLM score  - Consult OT/PT to assist with strengthening/mobility   - Keep Call bell within reach  - Keep bed low and locked with side rails adjusted as appropriate  - Keep care items and personal belongings within reach  - Initiate and maintain comfort rounds  - Make Fall Risk Sign visible to staff  - Offer Toileting every 2 Hours, in advance of need  - Initiate/Maintain bed/chair alarm  - Obtain necessary fall risk management equipment: bed/chair alarm  - Apply yellow socks and bracelet for high fall risk patients  - Consider moving patient to room near nurses station  Outcome: Progressing     Problem: PAIN - ADULT  Goal: Verbalizes/displays adequate comfort level or baseline comfort level  Description: Interventions:  - Encourage patient to monitor pain and request assistance  - Assess pain using appropriate pain scale  - Administer analgesics as ordered based on type and severity of pain and evaluate response  - Implement non-pharmacological measures as appropriate and evaluate response  - Consider cultural and social influences on pain and pain management  - Notify physician/advanced practitioner if interventions unsuccessful or patient reports new pain  - Educate patient/family on pain management process including their role and importance of  reporting pain   - Provide non-pharmacologic/complimentary pain relief interventions  Outcome: Progressing     Problem: INFECTION - ADULT  Goal: Absence or prevention of progression during hospitalization  Description: INTERVENTIONS:  - Assess and monitor for signs and symptoms of infection  - Monitor lab/diagnostic results  - Monitor all insertion sites,  i.e. indwelling lines, tubes, and drains  - Monitor endotracheal if appropriate and nasal secretions for changes in amount and color  - Edon appropriate cooling/warming therapies per order  - Administer medications as ordered  - Instruct and encourage patient and family to use good hand hygiene technique  - Identify and instruct in appropriate isolation precautions for identified infection/condition  Outcome: Progressing     Problem: SAFETY ADULT  Goal: Patient will remain free of falls  Description: INTERVENTIONS:  - Educate patient/family on patient safety including physical limitations  - Instruct patient to call for assistance with activity   - Consider consulting OT/PT to assist with strengthening/mobility based on AM PAC & -HL score  - Consult OT/PT to assist with strengthening/mobility   - Keep Call bell within reach  - Keep bed low and locked with side rails adjusted as appropriate  - Keep care items and personal belongings within reach  - Initiate and maintain comfort rounds  - Make Fall Risk Sign visible to staff  - Offer Toileting every 2 Hours, in advance of need  - Initiate/Maintain bed/chair alarm  - Obtain necessary fall risk management equipment: bed/chair alarm  - Apply yellow socks and bracelet for high fall risk patients  - Consider moving patient to room near nurses station  Outcome: Progressing  Goal: Maintain or return to baseline ADL function  Description: INTERVENTIONS:  -  Assess patient's ability to carry out ADLs; assess patient's baseline for ADL function and identify physical deficits which impact ability to perform ADLs (bathing, care of mouth/teeth, toileting, grooming, dressing, etc.)  - Assess/evaluate cause of self-care deficits   - Assess range of motion  - Assess patient's mobility; develop plan if impaired  - Assess patient's need for assistive devices and provide as appropriate  - Encourage maximum independence but intervene and supervise when necessary  - Involve family  in performance of ADLs  - Assess for home care needs following discharge   - Consider OT consult to assist with ADL evaluation and planning for discharge  - Provide patient education as appropriate  - Monitor functional capacity and physical performance, use of AM PAC & JH-HLM   - Monitor gait, balance and fatigue with ambulation    Outcome: Progressing  Goal: Maintains/Returns to pre admission functional level  Description: INTERVENTIONS:  - Perform AM-PAC 6 Click Basic Mobility/ Daily Activity assessment daily.  - Set and communicate daily mobility goal to care team and patient/family/caregiver.   - Collaborate with rehabilitation services on mobility goals if consulted  - Perform Range of Motion 3 times a day.  - Reposition patient every 3 hours.  - Dangle patient 3 times a day  - Stand patient 3 times a day  - Ambulate patient 3 times a day  - Out of bed to chair 3 times a day   - Out of bed for meals 3 times a day  - Out of bed for toileting  - Record patient progress and toleration of activity level   Outcome: Progressing     Problem: DISCHARGE PLANNING  Goal: Discharge to home or other facility with appropriate resources  Description: INTERVENTIONS:  - Identify barriers to discharge w/patient and caregiver  - Arrange for needed discharge resources and transportation as appropriate  - Identify discharge learning needs (meds, wound care, etc.)  - Arrange for interpretive services to assist at discharge as needed  - Refer to Case Management Department for coordinating discharge planning if the patient needs post-hospital services based on physician/advanced practitioner order or complex needs related to functional status, cognitive ability, or social support system  Outcome: Progressing     Problem: Knowledge Deficit  Goal: Patient/family/caregiver demonstrates understanding of disease process, treatment plan, medications, and discharge instructions  Description: Complete learning assessment and assess knowledge  base.  Interventions:  - Provide teaching at level of understanding  - Provide teaching via preferred learning methods  Outcome: Progressing     Problem: METABOLIC, FLUID AND ELECTROLYTES - ADULT  Goal: Electrolytes maintained within normal limits  Description: INTERVENTIONS:  - Monitor labs and assess patient for signs and symptoms of electrolyte imbalances  - Administer electrolyte replacement as ordered  - Monitor response to electrolyte replacements, including repeat lab results as appropriate  - Instruct patient on fluid and nutrition as appropriate  Outcome: Progressing  Goal: Fluid balance maintained  Description: INTERVENTIONS:  - Monitor labs   - Monitor I/O and WT  - Instruct patient on fluid and nutrition as appropriate  - Assess for signs & symptoms of volume excess or deficit  Outcome: Progressing     Problem: Prexisting or High Potential for Compromised Skin Integrity  Goal: Skin integrity is maintained or improved  Description: INTERVENTIONS:  - Identify patients at risk for skin breakdown  - Assess and monitor skin integrity including under and around medical devices   - Assess and monitor nutrition and hydration status  - Monitor labs  - Assess for incontinence   - Turn and reposition patient  - Assist with mobility/ambulation  - Relieve pressure over chente prominences   - Avoid friction and shearing  - Provide appropriate hygiene as needed including keeping skin clean and dry  - Evaluate need for skin moisturizer/barrier cream  - Collaborate with interdisciplinary team  - Patient/family teaching  - Consider wound care consult    Assess:  - Review Praful scale daily  - Clean and moisturize skin every shift  - Inspect skin when repositioning, toileting, and assisting with ADLS  - Assess under medical devices such as elmira every shift  - Assess extremities for adequate circulation and sensation     Bed Management:  - Have minimal linens on bed & keep smooth, unwrinkled  - Change linens as needed  when moist or perspiring  - Avoid sitting or lying in one position for more than 3 hours while in bed?Keep HOB at 30 degrees   - Toileting:  - Offer bedside commode  - Assess for incontinence every shift  - Use incontinent care products after each incontinent episode such as barrier cream    Activity:  - Mobilize patient 3 times a day  - Encourage activity and walks on unit  - Encourage or provide ROM exercises   - Turn and reposition patient every 3 Hours  - Use appropriate equipment to lift or move patient in bed  - Instruct/ Assist with weight shifting every shift when out of bed in chair  - Consider limitation of chair time 3 hour intervals    Skin Care:  - Avoid use of baby powder, tape, friction and shearing, hot water or constrictive clothing  - Relieve pressure over bony prominences using wedges  - Do not massage red bony areas      Outcome: Progressing     Problem: RESPIRATORY - ADULT  Goal: Achieves optimal ventilation and oxygenation  Description: INTERVENTIONS:  - Assess for changes in respiratory status  - Assess for changes in mentation and behavior  - Position to facilitate oxygenation and minimize respiratory effort  - Oxygen administered by appropriate delivery if ordered  - Initiate smoking cessation education as indicated  - Encourage broncho-pulmonary hygiene including cough, deep breathe, Incentive Spirometry  - Assess the need for suctioning and aspirate as needed  - Assess and instruct to report SOB or any respiratory difficulty  - Respiratory Therapy support as indicated  Outcome: Progressing     Problem: SAFETY,RESTRAINT: NV/NON-SELF DESTRUCTIVE BEHAVIOR  Goal: Remains free of harm/injury (restraint for non violent/non self-detsructive behavior)  Description: INTERVENTIONS:  - Instruct patient/family regarding restraint use   - Assess and monitor physiologic and psychological status   - Provide interventions and comfort measures to meet assessed patient needs   - Identify and implement  measures to help patient regain control  - Assess readiness for release of restraint   Outcome: Progressing  Goal: Returns to optimal restraint-free functioning  Description: INTERVENTIONS:  - Assess the patient's behavior and symptoms that indicate continued need for restraint  - Identify and implement measures to help patient regain control  - Assess readiness for release of restraint   Outcome: Progressing     Problem: Nutrition/Hydration-ADULT  Goal: Nutrient/Hydration intake appropriate for improving, restoring or maintaining nutritional needs  Description: Monitor and assess patient's nutrition/hydration status for malnutrition. Collaborate with interdisciplinary team and initiate plan and interventions as ordered.  Monitor patient's weight and dietary intake as ordered or per policy. Utilize nutrition screening tool and intervene as necessary. Determine patient's food preferences and provide high-protein, high-caloric foods as appropriate.     INTERVENTIONS:  - Monitor oral intake, urinary output, labs, and treatment plans  - Assess nutrition and hydration status and recommend course of action  - Evaluate amount of meals eaten  - Assist patient with eating if necessary   - Allow adequate time for meals  - Recommend/ encourage appropriate diets, oral nutritional supplements, and vitamin/mineral supplements  - Order, calculate, and assess calorie counts as needed  - Recommend, monitor, and adjust tube feedings and TPN/PPN based on assessed needs  - Assess need for intravenous fluids  - Provide specific nutrition/hydration education as appropriate  - Include patient/family/caregiver in decisions related to nutrition  Outcome: Progressing

## 2025-07-11 NOTE — PROGRESS NOTES
Progress Note - Nephrology   Name: Naresh Carpio 65 y.o. male I MRN: [id number]  Unit/Bed#: 4 [facility] 414-01 I Date of Admission: 7/8/2025   Date of Service: 7/11/2025 I Hospital Day: 2    Assessment & Plan  ESRD (end stage renal disease) (HCC)  -Outpatient unit [facility] due to recent rehab admission.  Was prior at [facility]  - Access-right IJ PermCath  - Prior estimated dry weight on discharge in June 2025 was 89.5 kg but dry weight was being challenged with dialysis  - Noted patient only had partial treatment last Thursday and only had 35 minutes of treatment on Saturday.  Was sent to the hospital on July 8 with erratic behavior  - Outpatient schedule-TTS  - Patient has been noncompliant with outpatient dialysis per my discussion with outpatient dialysis nurse-for at least the past 2 weeks he has been shortening treatment to 2-hour treatments and this past Saturday only did 30-minute treatments  - 7/8-patient completed dialysis treatment for 3-hour treatment with lower flows as he has been shortening many treatments  - 7/9-plan for short dialysis treatment for clearance  - 7/10 - HD for regular treatment. K improved with HD yesterday. Na improving with UF with HD.  - 7/11-patient's mentation has improved and he is back to his baseline.  Alert and oriented x 3.  No longer agitated.  Sodium, potassium, bicarbonate appropriate.  Hemoglobin at goal.    Plan  - Dialysis tomorrow Saturday  - Patient's mentation has improved and he is back to baseline  - Retinal detachment concern on CT head-patient states he follows with ophthalmology as outpatient.  Reviewed with primary team advanced practitioner  Secondary hyperparathyroidism (HCC)  -Home regimen includes sevelamer--> phos improving and stable 7/11 and no changes  At risk for acid-base imbalance  -Bicarbonate appropriate  Electrolyte imbalance risk  -Potassium 5.5.  improving to 4.5 with dialysis 7/10 and potassium appropriate 7/11 and no  changes  HTN (hypertension)  -Prior was on nifedipine 30 mg daily and furosemide 20 mg twice daily which was adjusted in June 2025 to metoprolol 25 mg twice daily, nifedipine 30 mg daily  - To note, patient was noted to have bradycardia as outpatient?  Will need to confirm if patient is on beta-blocker.  Currently heart rate is okay  CHF (congestive heart failure) (HCC)  -Patient has a history of grade 1 diastolic dysfunction.  Prior echocardiogram with EF 60 to 65%  -euvolemic 7/11          Anemia  -Hold for now given mentation changes  - Is on Mircera and Venofer as outpatient  Agitation  Etiology unclear-likely uremia.  Resolved with dialysis treatments.  - CT head  unrevealing for acute issue  - Urinalysis unrevealing  - There may be a uremic component as the patient has had poor dialysis over the past at least 2 weeks.  Now that the patient uremia has resolved and mentation has improved and he is oriented x 3, he states that he has been shortening his treatments due to lower extremity pain and neuropathy.  - neuro on board  - repeat CT head -   - repeat carotid u/s -   AMS (altered mental status)  -Evaluation in progress per primary team  Paroxysmal atrial fibrillation (HCC)  Per primary team  Abdominal pain  Resolved and management per primary team  Diabetic wound to left plantar foot  Resolved and management per primary team  Retinal detachment  Per primary team    I have reviewed the nephrology recommendations including dialysis tomorrow, with primary team advanced practitioner, and we are in agreement with renal plan including the information outlined above.     Subjective   Brief History of Admission - Balaji is a 65 y.o. male with past medical history of ESRD on hemodialysis at [facility], recent hospitalization in June 2025 with respiratory failure in the setting of volume overload/COVID-19 infection, hypertension, retinopathy, diastolic heart failure, paroxysmal A-fib, prior history of  retroperitoneal hematoma in April after a fall and underwent IR embolization of distal right deep circumflex iliac artery and superior right gluteal artery, who was admitted to [facility] 7/8 after presenting with agitation and confusion. A renal consultation is requested today for assistance in the management of ESRD. Patient is not providing adequate history. I called [facility] dialysis unit and spoke with the nurse. Per nursing team, patient was aggressive and agitated and confused and was sent to the ER. This has been an ongoing issue over the past 1 to 2 weeks. He has been shortening his diet treatments to approximately 2-hour treatments for the past 2 weeks and this past Saturday he only did 30 minutes of treatment. The patient states that he is agreeable to the dialysis but he has no answer any other questions..      Mentation is resolved on 7/11.  Likely in the setting of treatment of uremia with dialysis past 24 hours-no acute issues.  Mentation has improved and he is back to his baseline.        Objective :  Temp:  [95.8 °F (35.4 °C)-98 °F (36.7 °C)] 97.2 °F (36.2 °C)  HR:  [60-68] 62  BP: (125-159)/(51-73) 142/51  Resp:  [12-18] 12  SpO2:  [92 %-95 %] 94 %  O2 Device: None (Room air)    Current Weight: Weight - Scale: 97 kg (213 lb 13.5 oz)  First Weight: Weight - Scale: 97 kg (213 lb 13.5 oz)  I/O         07/09 0701  07/10 0700 07/10 0701  07/11 0700 07/11 0701  07/12 0700    P.O. 60 50     I.V. (mL/kg) 500 (5.2) 510 (5.3)     Total Intake(mL/kg) 560 (5.8) 560 (5.8)     Urine (mL/kg/hr) 500 (0.2) 500 (0.2)     Other 1500 1500     Total Output 2000 2000     Net -1440 -1440            Unmeasured Urine Occurrence 2 x            Physical Exam  General: NAD  Skin: no rash  Eyes: anicteric sclera  ENT: moist mucous membrane  Neck: supple  Chest: CTA b/l, no ronchii, no wheeze, no rubs, no rales  CVS: s1s2, no murmur, no gallop, no rub  Abdomen: soft, nontender, nl sounds  Extremities: no  "edema LE b/l, tunneled dialysis catheter site covered, left lower extremity foot wounds wrapped  : no kandy  Neuro: AAOX3  Psych: normal affect    Medications:  Current Medications[1]      Lab Results: I have reviewed the following results:  Results from last 7 days   Lab Units 07/11/25  0630 07/11/25  0445 07/10/25  0616 07/09/25  0707 07/08/25  1144   WBC Thousand/uL 5.12  --  7.59 6.85 5.83   HEMOGLOBIN g/dL 12.1  --  11.8* 11.4* 10.3*   HEMATOCRIT % 40.1  --  39.5 37.9 34.5*   PLATELETS Thousands/uL 212  --  213 202 198   POTASSIUM mmol/L  --  4.2 4.5 5.5* 5.7*   CHLORIDE mmol/L  --  98 97 96 94*   CO2 mmol/L  --  24 22 25 28   BUN mg/dL  --  18 29* 35* 54*   CREATININE mg/dL  --  4.40* 6.08* 6.98* 8.89*   CALCIUM mg/dL  --  8.5 8.4 8.7 9.1   MAGNESIUM mg/dL  --   --  2.1 2.2  --    PHOSPHORUS mg/dL  --   --  4.3* 4.4*  --    ALBUMIN g/dL  --   --   --   --  3.6       Administrative Statements     Portions of the record may have been created with voice recognition software. Occasional wrong word or \"sound a like\" substitutions may have occurred due to the inherent limitations of voice recognition software. Read the chart carefully and recognize, using context, where substitutions have occurred.If you have any questions, please contact the dictating provider.       [1]   Current Facility-Administered Medications:     acetaminophen (TYLENOL) tablet 975 mg, 975 mg, Oral, Q8H [facility], Nallely Pisano PA-C, 975 mg at 07/11/25 0531    albuterol inhalation solution 2.5 mg, 2.5 mg, Nebulization, Q6H PRN, Nirav Bruce MD    allopurinol (ZYLOPRIM) tablet 100 mg, 100 mg, Oral, Daily, GRANT Valdivia, 100 mg at 07/11/25 0854    aspirin chewable tablet 81 mg, 81 mg, Oral, Daily, GRANT Valdivia, 81 mg at 07/11/25 0854    atorvastatin (LIPITOR) tablet 80 mg, 80 mg, Oral, Daily With Dinner, GRANT Valdivia, 80 mg at 07/10/25 1730    famotidine (PEPCID) tablet 20 mg, 20 mg, Oral, HS, " GRANT Valdivia, 20 mg at 07/10/25 2156    furosemide (LASIX) tablet 20 mg, 20 mg, Oral, BID, GRANT Valdivia, 20 mg at 07/11/25 0854    gabapentin (NEURONTIN) capsule 100 mg, 100 mg, Oral, Once per day on Monday Wednesday Friday, GRANT Valdivia, 100 mg at 07/11/25 0854    heparin (porcine) subcutaneous injection 5,000 Units, 5,000 Units, Subcutaneous, Q8H KEMAL, GRANT Valdivia, 5,000 Units at 07/11/25 0531    hydrALAZINE (APRESOLINE) injection 5 mg, 5 mg, Intravenous, Q6H PRN, Nallely Pisano PA-C, 5 mg at 07/09/25 1604    lidocaine (LIDODERM) 5 % patch 1 patch, 1 patch, Topical, Daily, GRANT Valdivia, 1 patch at 07/11/25 0854    melatonin tablet 6 mg, 6 mg, Oral, HS, GRANT Valdivia, 6 mg at 07/10/25 2156    methocarbamol (ROBAXIN) tablet 500 mg, 500 mg, Oral, TID PRN, GRANT Valdivia, 500 mg at 07/11/25 0213    metoprolol succinate (TOPROL-XL) 24 hr tablet 25 mg, 25 mg, Oral, Q12H KEMAL, GRANT Valdivia, 25 mg at 07/11/25 0854    mupirocin (BACTROBAN) 2 % nasal ointment, , Nasal, Q12H KEMAL, GRANT Valdivia, 1 Application at 07/11/25 0854    NIFEdipine (PROCARDIA XL) 24 hr tablet 30 mg, 30 mg, Oral, After Dinner, GRANT Valdivia, 30 mg at 07/10/25 1730    oxyCODONE (ROXICODONE) split tablet 2.5 mg, 2.5 mg, Oral, Q6H PRN, Nallely Pisano PA-C, 2.5 mg at 07/10/25 2235    senna (SENOKOT) tablet 8.6 mg, 8.6 mg, Oral, Daily With Lunch, GRANT Valdivia, 8.6 mg at 07/10/25 1343    sevelamer (RENAGEL) tablet 1,600 mg, 1,600 mg, Oral, TID With Meals, GRANT Valdivia, 1,600 mg at 07/11/25 0838

## 2025-07-11 NOTE — ASSESSMENT & PLAN NOTE
Patient demonstrated agitation at the dialysis clinic which is unlike the patient, suspect secondary to missing dialysis, elevated creatinine  Undergoing emergent dialysis this afternoon.  Initially requiring bilateral wrist restraints to prevent patient from interfering with equipment and lines.  Initially patient was able to say that he was at [facility], was not sure of the year, knew the president.  Patient back to baseline mentation, stated that he was very embarrassed that he was agitated and aggressive that he is not normally like that.  As noted above we discussed how that when the toxins build up from him not doing dialysis he could cause him to have reactions like this.

## 2025-07-11 NOTE — ASSESSMENT & PLAN NOTE
Patient sent in from dialysis center for altered mental status and agitation.  CT head: No acute intracranial abnormality. Chronic microangiopathic changes.   Initial concern for uremia given patient has shortened or missed dialysis sessions past two weeks, however, per nephrology less likely uremia alone contributing  UA unrevealing, glucose stable  TSH, B12, ammonia WNL  No signs/symptoms of infection  Requiring restraints due to agitation  Neurology consulted  MRI brain ordered - patient declined; reports he is extremely claustrophobic even if he gets the IV ativan  Repeat CT head negative for acute intracranial abnormality  Patient appears back to baseline, pleasant, cooperative.  We discussed how missing dialysis causes the toxins in his body to build up and can cause him to have periods of agitation and fogginess.  Continue to monitor

## 2025-07-11 NOTE — OCCUPATIONAL THERAPY NOTE
"Occupational Therapy Evaluation/Treatment       07/11/25 1125   OT Last Visit   OT Visit Date 07/11/25   Note Type   Note type Evaluation   Pain Assessment   Pain Assessment Tool 0-10   Pain Score 10 - Worst Possible Pain   Pain Location/Orientation Orientation: Mid;Orientation: Lower;Location: Back   Pain Onset/Description Onset: Ongoing   Hospital Pain Intervention(s) Repositioned;Ambulation/increased activity;Emotional support  (CURLY Murillo made aware)   Restrictions/Precautions   Braces or Orthoses LSO  (L2 vertebral fx; to be worn when HOB >45 degrees or when pt OOB)   Other Precautions Contact/isolation;Cognitive;Chair Alarm;Bed Alarm;Fall Risk;Pain;Visual impairment   Home Living   [facility]  [place]   Home Equipment Walker;Wheelchair-manual;Hospital bed   Additional Comments Pt was ambulating at STR with RW with assistance.   Prior Function   Level of [place] Needs assistance with ADLs;Needs assistance with functional mobility;Needs assistance with IADLS   Lives With Facility staff   Receives Help From Other (Comment)  (Facility staff;)   IADLs Family/Friend/Other provides transportation;Family/Friend/Other provides meals;Family/Friend/Other provides medication management   Falls in the last 6 months 1 to 4   Vocational On disability   General   Additional Pertinent History presents with altered mental status and episodes of agitation that was noted at dialysis clinic.   Subjective   Subjective \"Just when I start to feel better, something else happens!\"   ADL   Where Assessed Chair   Eating Assistance 5  Supervision/Setup   Eating Deficit Setup;Verbal cueing;Supervision/safety;Increased time to complete   Grooming Assistance 4  Minimal Assistance   Grooming Deficit Setup;Verbal cueing;Supervision/safety;Increased time to complete   UB Bathing Assistance 3  Moderate Assistance   UB Bathing Deficit Setup;Steadying;Verbal cueing;Supervision/safety;Increased time to complete   LB " Bathing Assistance 2  Maximal Assistance   LB Bathing Deficit Setup;Steadying;Verbal cueing;Supervision/safety;Increased time to complete   UB Dressing Assistance 3  Moderate Assistance   UB Dressing Deficit Setup;Verbal cueing;Supervision/safety;Increased time to complete   LB Dressing Assistance 2  Maximal Assistance   LB Dressing Deficit Setup;Requires assistive device for steadying;Verbal cueing;Supervision/safety;Increased time to complete   Toileting Assistance  2  Maximal Assistance   Toileting Deficit Setup;Steadying;Verbal cueing;Supervison/safety;Increased time to complete;Use of bedpan/urinal setup;Bedside commode;Clothing management up;Clothing management down;Perineal hygiene   Additional Comments Increased assistance with ADLS needed due to severe back pain, nausea and weakness   Bed Mobility   Rolling R 4  Minimal assistance   Additional items Assist x 1;Verbal cues;Bedrails;Increased time required   Rolling L 4  Minimal assistance   Additional items Assist x 1;Verbal cues;Bedrails;Increased time required   Supine to Sit 4  Minimal assistance   Additional items Assist x 1;Verbal cues;Increased time required;Bedrails   Transfers   Sit to Stand 3  Moderate assistance   Additional items Assist x 1;Verbal cues;Increased time required;Armrests   Stand to Sit 4  Minimal assistance   Additional items Assist x 1;Verbal cues;Armrests   Balance   Static Sitting Fair   Dynamic Sitting Fair -   Static Standing Fair -  (with RW)   Dynamic Standing Poor +  (with RW)   Ambulatory Fair -  (with RW)   Activity Tolerance   Activity Tolerance Patient limited by fatigue;Patient limited by pain  (Limited by nausea and vomiting)   Nurse Made Aware CURLY Ho   RUE Assessment   RUE Assessment WFL  (gross strength 3+/5)   LUE Assessment   LUE Assessment WFL  (gross strength 3+/5)   Hand Function   Gross Motor Coordination Functional   Fine Motor Coordination Functional   Vision-Basic Assessment   Current Vision Wears  "glasses only for reading   Visual History Other (Comment)  (detached retina right eye per pt, retinopathy)   Patient Visual Report Unable to keep objects in focus  (can see gross shapes and colors, \"but no details\")   Vision - Complex Assessment   Ocular Range of Motion Intact   Tracking Impaired  (decreased right eye to right visual fields)   Psychosocial   Psychosocial (WDL) X   Patient Behaviors/Mood Anxious   Needs Expressed Physical   Ability to Express Feelings Able to express   Ability to Express Needs Able to express   Ability to Express Thoughts Needs assistance   Ability to Understand Others Usually understands   Cognition   Overall Cognitive Status Impaired   Arousal/Participation Alert;Cooperative   Attention Attends with cues to redirect   Orientation Level Oriented to person;Oriented to place;Disoriented to time;Disoriented to situation   Memory Decreased recall of precautions;Decreased recall of recent events;Decreased short term memory   Following Commands Follows multistep commands with increased time or repetition   Comments decreased safety awareness   Assessment   Limitation Decreased ADL status;Decreased UE strength;Decreased Safe judgement during ADL;Decreased cognition;Decreased endurance;Visual deficit;Decreased self-care trans;Decreased high-level ADLs  (decreased balance and mobility)   Prognosis Good   Assessment Patient evaluated by Occupational Therapy.  Patient admitted with AMS (altered mental status).  The patients occupational profile, medical and therapy history includes a extensive additional review of physical, cognitive, or psychosocial history related to current functional performance.  Comorbidities affecting functional mobility and ADLS include: atrial fibrillation, diabetes, end-stage renal disease on dialysis TTS, coronary artery disease, hyperlipidemia, hypertension  CKD, COVID-19, hypertension, osteoarthritis, and PAD.  .  Prior to admission, patient was requiring assist " "for functional mobility with RW, requiring assist for ADLS, requiring assist for IADLS, and in short term rehab.  The evaluation identifies the following performance deficits: weakness, impaired balance, decreased endurance, increased fall risk, new onset of impairment of functional mobility, decreased ADLS, decreased IADLS, pain, decreased activity tolerance, decreased safety awareness, decreased cognition, decreased strength, impaired psychosocial skills, visual deficits, and decreased standing and/or sitting tolerance, that result in activity limitations and/or participation restrictions. Personal factors affecting patient at time of initial evaluation include: fall history, anxiety, decreased insight, acute pain, and higher fall risk. This evaluation requires clinical decision making of high complexity, because the patient presents with comorbidites that affect occupational performance and required significant modification of tasks or assistance with consideration of multiple treatment options.  The Barthel Index was used as a functional outcome tool presenting with a score of Barthel Index Score: 35, indicating marked limitations of functional mobility and ADLS.  The patient's raw score on the AM-PAC Daily Activity Inpatient Short Form is 14. A raw score of less than 19 suggests the patient may benefit from discharge to post-acute rehabilitation services. Please refer to the recommendation of the Occupational Therapist for safe discharge planning. Patient will benefit from skilled Occupational Therapy services to address above deficits and facilitate a safe return to prior level of function.   Goals   Patient Goals \"to be normal again\"   STG Time Frame   (1-7)   Short Term Goal #1 Eating: independent; Grooming: supervision seated; Bathing: min assist; Upper Body Dressing min assist; Lower Body Dressing: mod assist; Toileting: min assist; Patient will increase stand pivot bed, chair, commode transfer to " supervision with rolling walker to increase performance and safety with ADLS and functional mobility; Patient will increase standing tolerance to 3 minutes during ADL task to decrease assistance level and decrease fall risk; Patient will increase bed mobility to supervision in preparation for ADLS and transfers; Patient will increase functional mobility to and from bathroom with rolling walker with min assist to increase performance with ADLS and to use a toilet; Patient will tolerate 5 minutes of UE ROM/strengthening to increase general activity tolerance and performance in ADLS/IADLS; Patient will improve functional activity tolerance to 7 minutes of sustained functional tasks to increase participation in basic self-care and decrease assistance level;  Patient will be able to to verbalize understanding and perform energy conservation/proper body mechanics during ADLS and functional mobility at least 75% of the time with minimal cueing to decrease signs of fatigue and increase stamina to return to prior level of function; Patient will increase static/dynamic sitting balance to fair+ to improve the ability to sit at edge of bed or on a chair for ADLS;  Patient will increase static/dynamic standing balance to fair to improve postural stability and decrease fall risk during standing ADLS and transfers.  Patient will tolerate out of bed to chair for 1-2 meals to increase ability to feed self and actively engage in ADLS.   LTG Time Frame   (8-14)   Long Term Goal #1 Grooming: independent seated; Bathing: supervision; Upper Body Dressing supervision; Lower Body Dressing: min assist; Toileting: supervision; Patient will increase stand pivot bed, chair, commode transfer to independent with rolling walker to increase performance and safety with ADLS and functional mobility; Patient will increase standing tolerance to 6 minutes during ADL task to decrease assistance level and decrease fall risk; Patient will increase bed  mobility to independent in preparation for ADLS and transfers; Patient will increase functional mobility to and from bathroom with rolling walker with supervision to increase performance with ADLS and to use a toilet; Patient will tolerate 10 minutes of UE ROM/strengthening to increase general activity tolerance and performance in ADLS/IADLS; Patient will improve functional activity tolerance to 15 minutes of sustained functional tasks to increase participation in basic self-care and decrease assistance level;  Patient will be able to to verbalize understanding and perform energy conservation/proper body mechanics during ADLS and functional mobility at least 90% of the time with no cueing to decrease signs of fatigue and increase stamina to return to prior level of function; Patient will increase static/dynamic sitting balance to good to improve the ability to sit at edge of bed or on a chair for ADLS;  Patient will increase static/dynamic standing balance to fair+ to improve postural stability and decrease fall risk during standing ADLS and transfers.  Patient will tolerate out of bed to chair for all meals to increase ability to feed self and actively engage in ADLS; Pt will score >/= 23/24 on AM-PAC Daily Activity Inpatient scale to promote safe independence with ADLs and functional mobility; Pt will score >/= 75/100 on Barthel Index in order to decrease caregiver assistance needed and increase ability to perform ADLs and functional mobility; Patient will demonstrate compensatory techniques for low vision with minimal verbal cues to increase safety awareness, increase independence with ADLS and decrease fall risk.   Plan   Treatment Interventions ADL retraining;Visual perceptual retraining;Functional transfer training;UE strengthening/ROM;Endurance training;Cognitive reorientation;Patient/family training;Equipment evaluation/education;Compensatory technique education;Energy conservation;Activityengagement   Goal  Expiration Date 07/25/25   OT Frequency 3-5x/wk   Discharge Recommendation   Rehab Resource Intensity Level, OT II (Moderate Resource Intensity)   AM-PAC Daily Activity Inpatient   Lower Body Dressing 2   Bathing 2   Toileting 2   Upper Body Dressing 2   Grooming 3   Eating 3   Daily Activity Raw Score 14   Daily Activity Standardized Score (Calc for Raw Score >=11) 33.39   AM-PAC Applied Cognition Inpatient   Following a Speech/Presentation 2   Understanding Ordinary Conversation 3   Taking Medications 2   Remembering Where Things Are Placed or Put Away 3   Remembering List of 4-5 Errands 2   Taking Care of Complicated Tasks 2   Applied Cognition Raw Score 14   Applied Cognition Standardized Score 32.02   Barthel Index   Feeding 5   Bathing 0   Grooming Score 0   Dressing Score 5   Bladder Score 0   Bowels Score 10   Toilet Use Score 5   Transfers (Bed/Chair) Score 10   Mobility (Level Surface) Score 0   Stairs Score 0   Barthel Index Score 35   Additional Treatment Session   Start Time 1045   End Time 1125   Treatment Assessment Pt seen for ADL training. Education and training with teachback method begun with pt regarding energy conservation/proper body mechanics during ADLS and functional mobility to decrease fall risks, decrease signs of fatigue and increase stamina needed to return to prior level of function. Pt able to don LSO while in bed with MaxA. Supine to sit Marvin. Able to don socks with MaxA while seated at edge of bed. Transfer bed to chair with Marvin and RW and cues. Pt complained that standard chair was too uncomfortable, so switched to recliner chair through multiple transitions from chair to bed to recliner with Min/ModA and RW and mod cues due to vision impairment. Intermittent rest breaks needed due to c/o increased pain after functional mobility tasks. Pt then c/o nausea and vomited on/off for 10 minutes. CURLY Ho made aware. Pt able to wash hands and face with Marvin. Declined to brush teeth  or gargle with mouthwash. Pt anxious about need for continued rehab. Provided education regarding OT goals and emotional support to pt. Pt demonstrating good verbal understanding of all information provided and all questions answered. Patient left OOB in recliner chair with all needs within reach and tab alarm in place, resting comfortably. Pt instructed to increase sitting tolerance OOB for at least 1-2 meals/day. Continue OT per POC.   End of Consult   Education Provided Yes   Patient Position at End of Consult Bedside chair;Bed/Chair alarm activated;All needs within reach   Nurse Communication Nurse aware of consult   Licensure   [place] License Number  Farheen Conklin MS, OTR/L, [place] Lic# [id number]

## 2025-07-11 NOTE — ASSESSMENT & PLAN NOTE
Etiology unclear-likely uremia.  Resolved with dialysis treatments.  - CT head  unrevealing for acute issue  - Urinalysis unrevealing  - There may be a uremic component as the patient has had poor dialysis over the past at least 2 weeks.  Now that the patient uremia has resolved and mentation has improved and he is oriented x 3, he states that he has been shortening his treatments due to lower extremity pain and neuropathy.  - neuro on board  - repeat CT head -   - repeat carotid u/s -

## 2025-07-11 NOTE — ASSESSMENT & PLAN NOTE
Wound care nurse noted full thickness diabetic wound to left plantar foot with black eschar and foul smelling serosanguinous drainage   Wound packed and podiatry consulted, follow-up on recommendations

## 2025-07-11 NOTE — ASSESSMENT & PLAN NOTE
-Potassium 5.5.  improving to 4.5 with dialysis 7/10 and potassium appropriate 7/11 and no changes

## 2025-07-11 NOTE — ASSESSMENT & PLAN NOTE
-Patient has a history of grade 1 diastolic dysfunction.  Prior echocardiogram with EF 60 to 65%  -euvolemic 7/11

## 2025-07-11 NOTE — ASSESSMENT & PLAN NOTE
CT head: Redemonstration of posterior right globe hyperdensity that again is suggestive of retinal detachment.   Patient and sister report he has surgery scheduled with ophthalmologist later this month in [place]

## 2025-07-11 NOTE — ASSESSMENT & PLAN NOTE
Wt Readings from Last 3 Encounters:   07/08/25 97 kg (213 lb 13.5 oz)   06/07/25 89.7 kg (197 lb 12 oz)   05/13/25 92.6 kg (204 lb 3.2 oz)     Hx G1DD with previous EF 60-65%  BNP 3081, noncompliant with dialysis sessions recently  Continue Lasix 20 mg BID, Toprol XL 25 mg BID  CT with bilateral pleural effusions, currently stable on room air, consider thoracentesis if needed  Dialysis per nephrology

## 2025-07-11 NOTE — PROGRESS NOTES
Progress Note - Hospitalist   Name: Naresh Carpio 65 y.o. male I MRN: [id number]  Unit/Bed#: [address] [place], [place] [address] I Date of Admission: 7/8/2025   Date of Service: 7/11/2025 I Hospital Day: 2    Assessment & Plan  AMS (altered mental status)  Patient sent in from dialysis center for altered mental status and agitation.  CT head: No acute intracranial abnormality. Chronic microangiopathic changes.   Initial concern for uremia given patient has shortened or missed dialysis sessions past two weeks, however, per nephrology less likely uremia alone contributing  UA unrevealing, glucose stable  TSH, B12, ammonia WNL  No signs/symptoms of infection  Requiring restraints due to agitation  Neurology consulted  MRI brain ordered - patient declined; reports he is extremely claustrophobic even if he gets the IV ativan  Repeat CT head negative for acute intracranial abnormality  Patient appears back to baseline, pleasant, cooperative.  We discussed how missing dialysis causes the toxins in his body to build up and can cause him to have periods of agitation and fogginess.  Continue to monitor  ESRD (end stage renal disease) ([facility])  Lab Results   Component Value Date    EGFR 13 07/11/2025    EGFR 8 07/10/2025    EGFR 7 07/09/2025    CREATININE 4.40 (H) 07/11/2025    CREATININE 6.08 (H) 07/10/2025    CREATININE 6.98 (H) 07/09/2025   For past two weeks patient has been shortening dialysis sessions  TTS schedule  Nephrology consulted for dialysis management  Question patient as to why he is shortening his dialysis treatments he indicated that the diabetic neuropathy in his feet begin to hurt tremendously when he is not able to put weight on them and that is why he has been cutting the session short.  We discussed the possibility of perhaps him getting a foot massager to utilize while having dialysis to mitigate the neuropathy pain, he is agreeable to looking into this  CHF (congestive heart failure) ([facility])  Wt Readings from Last 3  Encounters:   07/08/25 97 kg (213 lb 13.5 oz)   06/07/25 89.7 kg (197 lb 12 oz)   05/13/25 92.6 kg (204 lb 3.2 oz)     Hx G1DD with previous EF 60-65%  BNP 3081, noncompliant with dialysis sessions recently  Continue Lasix 20 mg BID, Toprol XL 25 mg BID  CT with bilateral pleural effusions, currently stable on room air, consider thoracentesis if needed  Dialysis per nephrology  Abdominal pain  Patient reported lower abdominal pain this morning, screaming out in pain to nurse; later wanted to stop dialysis due to pain  On my evaluation, patient reports pain currently resolved, abdomen soft and non-tender; does report chronic lower back pain from compression fractures  CT a/p showed interval decrease in size of subhepatic and left gluteal hematomas as well as chronic compression fractures  Continue scheduled tylenol, gabapentin, robaxin PRN, low dose oxycodone PRN  Caution with pain meds given altered mental status  Anemia  Anemia chronic suspect secondary to ESRD  Hemoglobin stable  CBC in a.m.  Diabetic wound to left plantar foot  Wound care nurse noted full thickness diabetic wound to left plantar foot with black eschar and foul smelling serosanguinous drainage   Wound packed and podiatry consulted, follow-up on recommendations  At risk for acid-base imbalance    Nephrology following  Improving  HTN (hypertension)  Continue home nifedipine 30 mg daily, Toprol XL 25 mg BID, lasix 20 mg bid  Continue to monitor  Paroxysmal atrial fibrillation (HCC)  Continue Toprol XL 25 mg BID  Previously on Eliquis, per chart review patient refusing to take Eliquis due to history of hematoma in April 2025  Secondary hyperparathyroidism (HCC)  On sevelamer  Retinal detachment  CT head: Redemonstration of posterior right globe hyperdensity that again is suggestive of retinal detachment.   Patient and sister report he has surgery scheduled with ophthalmologist later this month in [place]    VTE Pharmacologic Prophylaxis:    "Moderate Risk (Score 3-4) - Pharmacological DVT Prophylaxis Ordered: heparin.    Mobility:   Basic Mobility Inpatient Raw Score: 14  JH-HLM Goal: 4: Move to chair/commode  JH-HLM Achieved: 5: Stand (1 or more minutes)  JH-HLM Goal achieved. Continue to encourage appropriate mobility.    Patient Centered Rounds: I performed bedside rounds with nursing staff today.   Discussions with Specialists or Other Care Team Provider: multidisciplinary team     Education and Discussions with Family / Patient: Attempted to update  (sister) via phone. Left voicemail.     Current Length of Stay: 2 day(s)  Current Patient Status: Inpatient   Certification Statement: The patient will continue to require additional inpatient hospital stay due to plan dialysis tomorrow, repeat labs in am, monitor mental status   Discharge Plan: Anticipate discharge in 24-48 hrs to rehab facility.    Code Status: Level 1 - Full Code    Subjective     Patient seen sitting up in bed eating breakfast.  Reports that he is embarrassed that he was \"out of control\", said that he did not know why he was like that, felt very paranoid at the time and thought people were coming after him.  We discussed how it is very important for him to make sure that he is compliant with his dialysis sessions so he does not have a buildup of toxins.  Patient question why he shortens his dialysis sessions and he indicates the diabetic neuropathy in his feet begins to hurt so badly when he cannot put pressure on his feet that he cannot take the pain and asked them to stop dialysis.  Discussed the possibility of exploring a foot massager portable one that he could take with him so he would have the pressure on his feet and perhaps mitigate the pain so he would be able to complete his dialysis.  Patient is interested and said that he will speak with his ex-wife regarding this and hopefully get a unit for him that he would be able to use at dialysis.    Objective " :  Temp:  [95.8 °F (35.4 °C)-97.9 °F (36.6 °C)] 97.5 °F (36.4 °C)  HR:  [58-68] 58  BP: (142-159)/(51-73) 152/60  Resp:  [12-16] 16  SpO2:  [91 %-96 %] 96 %  O2 Device: None (Room air)    Body mass index is 28.21 kg/m².     Input and Output Summary (last 24 hours):     Intake/Output Summary (Last 24 hours) at 7/11/2025 1431  Last data filed at 7/11/2025 0900  Gross per 24 hour   Intake 350 ml   Output --   Net 350 ml       Physical Exam  Vitals and nursing note reviewed.   Constitutional:       General: He is not in acute distress.  HENT:      Head: Normocephalic.      Nose: Nose normal.      Mouth/Throat:      Mouth: Mucous membranes are moist.     Eyes:      Extraocular Movements: Extraocular movements intact.      Conjunctiva/sclera: Conjunctivae normal.       Cardiovascular:      Rate and Rhythm: Normal rate.      Pulses: Normal pulses.      Heart sounds: Murmur heard.   Pulmonary:      Effort: Pulmonary effort is normal.      Breath sounds: Normal breath sounds.   Abdominal:      General: There is no distension.      Palpations: Abdomen is soft.      Tenderness: There is no abdominal tenderness.   Genitourinary:     Comments: Voiding spontaneously     Musculoskeletal:         General: Normal range of motion.      Cervical back: Normal range of motion.     Skin:     General: Skin is warm and dry.      Capillary Refill: Capillary refill takes less than 2 seconds.      Comments: Left ACW permacath present, dressing CDI     Neurological:      General: No focal deficit present.      Mental Status: He is alert and oriented to person, place, and time.     Psychiatric:         Mood and Affect: Mood normal.         Behavior: Behavior normal.         Thought Content: Thought content normal.         Judgment: Judgment normal.           Lines/Drains:  Lines/Drains/Airways       Active Status       Name Placement date Placement time Site Days    HD Permanent Double Catheter 08/30/23  --  Internal jugular  681    External  Urinary Catheter Small 07/10/25  1645  -- less than 1                            Lab Results: I have reviewed the following results:   Results from last 7 days   Lab Units 07/11/25  0630 07/09/25  0707 07/08/25  1144   WBC Thousand/uL 5.12   < > 5.83   HEMOGLOBIN g/dL 12.1   < > 10.3*   HEMATOCRIT % 40.1   < > 34.5*   PLATELETS Thousands/uL 212   < > 198   SEGS PCT %  --   --  58   LYMPHO PCT %  --   --  23   MONO PCT %  --   --  14*   EOS PCT %  --   --  4    < > = values in this interval not displayed.     Results from last 7 days   Lab Units 07/11/25  0445 07/09/25  0707 07/08/25  1144   SODIUM mmol/L 134*   < > 131*   POTASSIUM mmol/L 4.2   < > 5.7*   CHLORIDE mmol/L 98   < > 94*   CO2 mmol/L 24   < > 28   BUN mg/dL 18   < > 54*   CREATININE mg/dL 4.40*   < > 8.89*   ANION GAP mmol/L 12   < > 9   CALCIUM mg/dL 8.5   < > 9.1   ALBUMIN g/dL  --   --  3.6   TOTAL BILIRUBIN mg/dL  --   --  0.58   ALK PHOS U/L  --   --  78   ALT U/L  --   --  5*   AST U/L  --   --  8*   GLUCOSE RANDOM mg/dL 74   < > 86    < > = values in this interval not displayed.     Results from last 7 days   Lab Units 07/08/25  1144   INR  1.09             Results from last 7 days   Lab Units 07/08/25  1144   PROCALCITONIN ng/ml 0.25       Recent Cultures (last 7 days):         Imaging Results Review: I reviewed radiology reports from this admission including: CT chest.  Other Study Results Review: No additional pertinent studies reviewed.    Last 24 Hours Medication List:     Current Facility-Administered Medications:     acetaminophen (TYLENOL) tablet 975 mg, Q8H KEMAL    albuterol inhalation solution 2.5 mg, Q6H PRN    allopurinol (ZYLOPRIM) tablet 100 mg, Daily    aspirin chewable tablet 81 mg, Daily    atorvastatin (LIPITOR) tablet 80 mg, Daily With Dinner    famotidine (PEPCID) tablet 20 mg, HS    furosemide (LASIX) tablet 20 mg, BID    gabapentin (NEURONTIN) capsule 100 mg, Once per day on Monday Wednesday Friday    heparin (porcine)  subcutaneous injection 5,000 Units, Q8H KEMAL    hydrALAZINE (APRESOLINE) injection 5 mg, Q6H PRN    lidocaine (LIDODERM) 5 % patch 1 patch, Daily    melatonin tablet 6 mg, HS    methocarbamol (ROBAXIN) tablet 500 mg, TID PRN    metoprolol succinate (TOPROL-XL) 24 hr tablet 25 mg, Q12H KEMAL    mupirocin (BACTROBAN) 2 % nasal ointment, Q12H KEMAL    NIFEdipine (PROCARDIA XL) 24 hr tablet 30 mg, After Dinner    oxyCODONE (ROXICODONE) split tablet 2.5 mg, Q6H PRN    senna (SENOKOT) tablet 8.6 mg, Daily With Lunch    sevelamer (RENAGEL) tablet 1,600 mg, TID With Meals    Administrative Statements   Today, Patient Was Seen By: GRANT Valdivia  I have spent a total time of greater than 45  minutes in caring for this patient on the day of the visit/encounter including Diagnostic results, Importance of tx compliance, Risk factor reductions, Impressions, Counseling / Coordination of care, Documenting in the medical record, Reviewing/placing orders in the medical record (including tests, medications, and/or procedures), Obtaining or reviewing history  , and Communicating with other healthcare professionals .    **Please Note: This note may have been constructed using a voice recognition system.**

## 2025-07-12 ENCOUNTER — APPOINTMENT (INPATIENT)
Dept: DIALYSIS | Facility: HOSPITAL | Age: 65
DRG: 698 | End: 2025-07-12
Attending: INTERNAL MEDICINE
Payer: MEDICARE

## 2025-07-12 VITALS
TEMPERATURE: 96.6 F | DIASTOLIC BLOOD PRESSURE: 53 MMHG | WEIGHT: 213.85 LBS | OXYGEN SATURATION: 94 % | RESPIRATION RATE: 16 BRPM | HEART RATE: 58 BPM | SYSTOLIC BLOOD PRESSURE: 142 MMHG | BODY MASS INDEX: 28.21 KG/M2

## 2025-07-12 LAB
ANION GAP SERPL CALCULATED.3IONS-SCNC: 10 MMOL/L (ref 4–13)
BUN SERPL-MCNC: 30 MG/DL (ref 5–25)
CALCIUM SERPL-MCNC: 8.8 MG/DL (ref 8.4–10.2)
CHLORIDE SERPL-SCNC: 94 MMOL/L (ref 96–108)
CO2 SERPL-SCNC: 28 MMOL/L (ref 21–32)
CREAT SERPL-MCNC: 5.7 MG/DL (ref 0.6–1.3)
ERYTHROCYTE [DISTWIDTH] IN BLOOD BY AUTOMATED COUNT: 15.2 % (ref 11.6–15.1)
GFR SERPL CREATININE-BSD FRML MDRD: 9 ML/MIN/1.73SQ M
GLUCOSE SERPL-MCNC: 99 MG/DL (ref 65–140)
HCT VFR BLD AUTO: 41.7 % (ref 36.5–49.3)
HGB BLD-MCNC: 12.5 G/DL (ref 12–17)
MAGNESIUM SERPL-MCNC: 2.3 MG/DL (ref 1.9–2.7)
MCH RBC QN AUTO: 28 PG (ref 26.8–34.3)
MCHC RBC AUTO-ENTMCNC: 30 G/DL (ref 31.4–37.4)
MCV RBC AUTO: 93 FL (ref 82–98)
PHOSPHATE SERPL-MCNC: 4.2 MG/DL (ref 2.3–4.1)
PLATELET # BLD AUTO: 248 THOUSANDS/UL (ref 149–390)
PMV BLD AUTO: 9.2 FL (ref 8.9–12.7)
POTASSIUM SERPL-SCNC: 4.3 MMOL/L (ref 3.5–5.3)
RBC # BLD AUTO: 4.47 MILLION/UL (ref 3.88–5.62)
SODIUM SERPL-SCNC: 132 MMOL/L (ref 135–147)
WBC # BLD AUTO: 6.64 THOUSAND/UL (ref 4.31–10.16)

## 2025-07-12 PROCEDURE — 84100 ASSAY OF PHOSPHORUS: CPT | Performed by: INTERNAL MEDICINE

## 2025-07-12 PROCEDURE — 80048 BASIC METABOLIC PNL TOTAL CA: CPT | Performed by: INTERNAL MEDICINE

## 2025-07-12 PROCEDURE — 85027 COMPLETE CBC AUTOMATED: CPT | Performed by: INTERNAL MEDICINE

## 2025-07-12 PROCEDURE — 83735 ASSAY OF MAGNESIUM: CPT | Performed by: INTERNAL MEDICINE

## 2025-07-12 PROCEDURE — 90935 HEMODIALYSIS ONE EVALUATION: CPT | Performed by: INTERNAL MEDICINE

## 2025-07-12 PROCEDURE — 94760 N-INVAS EAR/PLS OXIMETRY 1: CPT

## 2025-07-12 PROCEDURE — 99239 HOSP IP/OBS DSCHRG MGMT >30: CPT | Performed by: NURSE PRACTITIONER

## 2025-07-12 RX ORDER — ONDANSETRON 4 MG/1
4 TABLET, ORALLY DISINTEGRATING ORAL EVERY 6 HOURS PRN
Status: DISCONTINUED | OUTPATIENT
Start: 2025-07-12 | End: 2025-07-12 | Stop reason: HOSPADM

## 2025-07-12 RX ADMIN — Medication 2.5 MG: at 08:18

## 2025-07-12 RX ADMIN — HEPARIN SODIUM 5000 UNITS: 5000 INJECTION INTRAVENOUS; SUBCUTANEOUS at 14:39

## 2025-07-12 RX ADMIN — MUPIROCIN 1 APPLICATION: 20 OINTMENT TOPICAL at 08:21

## 2025-07-12 RX ADMIN — SEVELAMER HYDROCHLORIDE 1600 MG: 800 TABLET ORAL at 08:19

## 2025-07-12 RX ADMIN — ONDANSETRON 4 MG: 4 TABLET, ORALLY DISINTEGRATING ORAL at 09:13

## 2025-07-12 RX ADMIN — SENNOSIDES 8.6 MG: 8.6 TABLET, FILM COATED ORAL at 12:28

## 2025-07-12 RX ADMIN — ALLOPURINOL 100 MG: 100 TABLET ORAL at 08:19

## 2025-07-12 RX ADMIN — HEPARIN SODIUM 5000 UNITS: 5000 INJECTION INTRAVENOUS; SUBCUTANEOUS at 05:47

## 2025-07-12 RX ADMIN — Medication 2.5 MG: at 01:00

## 2025-07-12 RX ADMIN — FUROSEMIDE 20 MG: 20 TABLET ORAL at 08:19

## 2025-07-12 RX ADMIN — SEVELAMER HYDROCHLORIDE 1600 MG: 800 TABLET ORAL at 12:28

## 2025-07-12 RX ADMIN — METOPROLOL SUCCINATE 25 MG: 25 TABLET, EXTENDED RELEASE ORAL at 08:19

## 2025-07-12 RX ADMIN — Medication 2.5 MG: at 14:38

## 2025-07-12 RX ADMIN — ACETAMINOPHEN 975 MG: 325 TABLET, FILM COATED ORAL at 14:38

## 2025-07-12 RX ADMIN — ACETAMINOPHEN 975 MG: 325 TABLET, FILM COATED ORAL at 05:47

## 2025-07-12 RX ADMIN — ASPIRIN 81 MG: 81 TABLET, CHEWABLE ORAL at 08:19

## 2025-07-12 RX ADMIN — METHOCARBAMOL 500 MG: 500 TABLET ORAL at 05:47

## 2025-07-12 NOTE — ASSESSMENT & PLAN NOTE
-Home regimen includes sevelamer--> phos improving and stable 7/11 and no changes in phosphorus stable on 7/12

## 2025-07-12 NOTE — ASSESSMENT & PLAN NOTE
CT head: Redemonstration of posterior right globe hyperdensity that again is suggestive of retinal detachment.   Patient and sister report he has surgery scheduled with ophthalmologist later this month in [place], July 30

## 2025-07-12 NOTE — ASSESSMENT & PLAN NOTE
Morning of 7/10 patient had complained of abdominal pain  CT a/p showed interval decrease in size of subhepatic and left gluteal hematomas as well as chronic compression fractures  Continue scheduled tylenol, gabapentin, robaxin PRN, low dose oxycodone PRN  Currently no abdominal pain  Discharged to short-term rehab today

## 2025-07-12 NOTE — ASSESSMENT & PLAN NOTE
Lab Results   Component Value Date    EGFR 9 07/12/2025    EGFR 13 07/11/2025    EGFR 8 07/10/2025    CREATININE 5.70 (H) 07/12/2025    CREATININE 4.40 (H) 07/11/2025    CREATININE 6.08 (H) 07/10/2025   For past two weeks patient has been shortening dialysis sessions  TTS schedule  Nephrology consulted for dialysis management  Question patient as to why he is shortening his dialysis treatments he indicated that the diabetic neuropathy in his feet begin to hurt tremendously when he is not able to put weight on them and that is why he has been cutting the session short.  We discussed the possibility of perhaps him getting a foot massager to utilize while having dialysis to mitigate the neuropathy pain, he is agreeable to looking into this  Patient will be discharged to short-term rehab today

## 2025-07-12 NOTE — PLAN OF CARE
Problem: METABOLIC, FLUID AND ELECTROLYTES - ADULT  Goal: Electrolytes maintained within normal limits  Description: INTERVENTIONS:  - Monitor labs and assess patient for signs and symptoms of electrolyte imbalances  - Administer electrolyte replacement as ordered  - Monitor response to electrolyte replacements, including repeat lab results as appropriate  - Instruct patient on fluid and nutrition as appropriate  Outcome: Progressing     Problem: Nutrition/Hydration-ADULT  Goal: Nutrient/Hydration intake appropriate for improving, restoring or maintaining nutritional needs  Description: Monitor and assess patient's nutrition/hydration status for malnutrition. Collaborate with interdisciplinary team and initiate plan and interventions as ordered.  Monitor patient's weight and dietary intake as ordered or per policy. Utilize nutrition screening tool and intervene as necessary. Determine patient's food preferences and provide high-protein, high-caloric foods as appropriate.     INTERVENTIONS:  - Monitor oral intake, urinary output, labs, and treatment plans  - Assess nutrition and hydration status and recommend course of action  - Evaluate amount of meals eaten  - Assist patient with eating if necessary   - Allow adequate time for meals  - Recommend/ encourage appropriate diets, oral nutritional supplements, and vitamin/mineral supplements  - Order, calculate, and assess calorie counts as needed  - Recommend, monitor, and adjust tube feedings and TPN/PPN based on assessed needs  - Assess need for intravenous fluids  - Provide specific nutrition/hydration education as appropriate  - Include patient/family/caregiver in decisions related to nutrition  Outcome: Progressing

## 2025-07-12 NOTE — [place] UNIVERSAL TRANSFER FORM
[place] UNIVERSAL TRANSFER FORM  (ALL ITEMS MUST BE COMPLETED)    1. TRANSFER FROM: [facility]      TRANSFER TO: [facility]    2. DATE OF TRANSFER: 7/12/2025                        TIME OF TRANSFER: 5:30p    3. PATIENT NAME: Naresh Carpio D      YOB: 1960                             GENDER: male    4. LANGUAGE:   English    5. PHYSICIAN NAME:  Nirav Bruce MD                   PHONE: [phone]    6. CODE STATUS: Level 1 - Full Code        Out of Hospital DNR Attached: No    7. :                                      :  Extended Emergency Contact Information  Primary Emergency Contact: Alicja Carpio  Mobile Phone: [phone]  Relation: Sister  Secondary Emergency Contact: Mercedes Resendez  Mobile Phone: [phone]  Relation: Friend           Health Care Representative/Proxy:  No           Legal Guardian:  No             NAME OF:           HEALTH CARE REPRESENTATIVE/PROXY:                                         OR           LEGAL GUARDIAN, IF NOT :                                               PHONE:  (Day)           (Night)                        (Cell)    8. REASON FOR TRANSFER: (Must include brief medical history and recent changes in physical function or cognition.) Altered mental status            V/S: /59 (BP Location: Right arm)   Pulse 56   Temp (!) 96.6 °F (35.9 °C) (Tympanic)   Resp 16   Wt 97 kg (213 lb 13.5 oz)   SpO2 100%   BMI 28.21 kg/m²           PAIN: Yes, Rating 10    9. PRIMARY DIAGNOSIS: AMS (altered mental status)      Secondary Diagnosis:         Pacemaker: No      Internal Defib: No          Mental Health Diagnosis (if Applicable):    10. RESTRAINTS: No     11. RESPIRATORY NEEDS: None    12. ISOLATION/PRECAUTION: MRSA and SiteNares    13. ALLERGY: Patient has no known allergies.    14. SENSORY:       Vision Good    15. SKIN CONDITION: Yes:  Vascular    16.  DIET: Regular    17. IV ACCESS: None    18. PERSONAL ITEMS SENT WITH PATIENT: Otherclothing    19. ATTACHED DOCUMENTS: MUST ATTACH CURRENT MEDICATION INFORMATION Face Sheet    20. AT RISK ALERTS:Falls        HARM TO: N/A    21. WEIGHT BEARING STATUS:         Left Leg: Limited        Right Leg: Limited    22. MENTAL STATUS:Alert, Oriented, and Forgetful    23. FUNCTION:        Walk: With Help        Transfer: With Help        Toilet: With Help        Feed: Self    24. IMMUNIZATIONS/SCREENING:     Immunization History   Administered Date(s) Administered    COVID-19 J&J (MyDROBE) vaccine 0.5 mL 05/07/2021    Pneumococcal Polysaccharide PPV23 07/14/2022    Tdap 10/08/2024       25. BOWEL: Continent    26. BLADDER: Continent    27. SENDING FACILITY CONTACT: [facility]                   Title: RN        Unit: 4N        Phone: [phone]          REC'G FACILITY CONTACT (if known):        Title:        Unit:         Phone:         FORM PREFILLED BY (if applicable)       Title:       Unit:        Phone:         FORM COMPLETED BY Rosa Em      Title: [facility]      Phone: [phone]

## 2025-07-12 NOTE — ASSESSMENT & PLAN NOTE
-Patient has a history of grade 1 diastolic dysfunction.  Prior echocardiogram with EF 60 to 65%  -euvolemic clinically but we will attempt 2 to 3 L ultrafiltration as the patient had intermittent episodes of hypoxia overnight and does gain weight between dialysis treatments though currently is below dry weight

## 2025-07-12 NOTE — PLAN OF CARE
Post-Dialysis RN Treatment Note    Blood Pressure:  Pre 151/69 mm/Hg  Post 133/59 mmHg   EDW:  94.0 kg    Weight:  Pre 88.5 kg   Post 85.5 kg   Mode of weight measurement: Bed Scale   Volume Removed: 3000 ml    Treatment duration: 240 minutes    NS given:  No    Treatment shortened No   Medications given during Rx: Zofran   Estimated Kt/V:  Not Applicable   Access type: Permacath/TDC   Needle Gauge:    Access Issues: No    Report called to primary nurse:   Yes /    Rosa Mata RN         3000 ml UF as tolerated, 3.5 hrs, 3K bath  Problem: METABOLIC, FLUID AND ELECTROLYTES - ADULT  Goal: Electrolytes maintained within normal limits  Description: INTERVENTIONS:  - Monitor labs and assess patient for signs and symptoms of electrolyte imbalances  - Administer electrolyte replacement as ordered  - Monitor response to electrolyte replacements, including repeat lab results as appropriate  - Instruct patient on fluid and nutrition as appropriate  Outcome: Progressing  Goal: Fluid balance maintained  Description: INTERVENTIONS:  - Monitor labs   - Monitor I/O and WT  - Instruct patient on fluid and nutrition as appropriate  - Assess for signs & symptoms of volume excess or deficit  Outcome: Progressing

## 2025-07-12 NOTE — ASSESSMENT & PLAN NOTE
Wt Readings from Last 3 Encounters:   07/08/25 97 kg (213 lb 13.5 oz)   06/07/25 89.7 kg (197 lb 12 oz)   05/13/25 92.6 kg (204 lb 3.2 oz)     Hx G1DD with previous EF 60-65%  BNP 3081, noncompliant with dialysis sessions recently  Continue Lasix 20 mg BID, Toprol XL 25 mg BID  CT with bilateral pleural effusions, stable on room air  Dialysis during hospitalization, patient back on his TTS schedule, next dialysis on Tuesday outpatient clinic  Discharge to short-term rehab today

## 2025-07-12 NOTE — ASSESSMENT & PLAN NOTE
Continue home nifedipine 30 mg daily, Toprol XL 25 mg BID, lasix 20 mg bid  Stable  Discharge to short-term rehab today

## 2025-07-12 NOTE — NURSING NOTE
Patient discharged to [facility] via [facility].  Report given to Farheen RN at facility.  Discharge instructions and medication reconciliation explained.  All questions answered.  All belongings accounted for.  Harpal in the cradle.

## 2025-07-12 NOTE — DISCHARGE INSTR - AVS FIRST PAGE
You were admitted secondary to altered mental status changes, we suspect secondary to not completing complete dialysis treatments    You received dialysis while you are in the hospital and came back to your baseline mentally    Please make sure that you are allowing the dialysis team to complete your sessions entirely    You will be returning to the care center of [facility] today to continue with your rehab    Continue to follow with outpatient wound center for the wounds on your feet    Your next dialysis treatment will be on Tuesday outpatient.    It has been a pleasure taking care of you during your hospitalization

## 2025-07-12 NOTE — CONSULTS
Podiatry - Consultation    Patient Information:   Naresh Carpio 65 y.o. male MRN: [id number]  Unit/Bed#: [address] [place] [address] Encounter: [id number]  PCP: Jeffrey Jackson  Date of Admission:  7/8/2025  Date of Consultation: 07/11/25  Requesting Physician: Nirav Bruce MD      ASSESSMENT:    Naresh Carpio is a 65 y.o. male with:    Diabetic ulcer left plantar foot, muscle tension, Delgadillo 1  Diabetic ulcer left medial hallux to level of subcutaneous tissue, Delgadillo 1  Diabetic ulcer left medial first metatarsophalangeal joint to level of subcutaneous tissue, Delgadillo 1  Left leg diabetic ulcer to level of dermis.  Type 2 diabetes  End-stage renal disease on hemodialysis    PLAN:    Local wound care performed to the left foot today consisting of Dermagran, dry sterile dressing to the plantar foot and left leg.  Betadine, Adaptic, Drechsel dressing to the left medial foot  Follow-up left foot x-ray  Elevation and offloading on green foam wedges or pillows when non-ambulatory.  Rest of care per primary team.    Weightbearing status: Weightbearing as tolerated    SUBJECTIVE:    History of Present Illness:    Naresh Carpio is a 65 y.o. male who is originally admitted 7/8/2025 due to altered mental status. Patient has a past medical history of type 2 diabetes, end-stage renal disease on hemodialysis.    We are consulted for evaluation and management of the patient's left foot.  The patient states that he has had ulcerations to his left foot on and off for many years.  He denies any systemic signs of infection at this time.    Review of Systems:    Constitutional: Negative.    HENT: Negative.    Eyes: Negative.    Respiratory: Negative.    Cardiovascular: Negative.    Gastrointestinal: Negative.    Musculoskeletal: History of right partial first ray amputation  Skin: Ulcerations left foot  Neurological: Peripheral neuropathy  Psych: Negative.     Past Medical and Surgical History:     Past Medical History[1]    Past Surgical  History[2]    Meds/Allergies:      Medications Prior to Admission:     acetaminophen (TYLENOL) 325 mg tablet    allopurinol (ZYLOPRIM) 100 mg tablet    aspirin 81 mg chewable tablet    atorvastatin (LIPITOR) 80 mg tablet    famotidine (PEPCID) 20 mg tablet    furosemide (LASIX) 20 mg tablet    gabapentin (NEURONTIN) 100 mg capsule    ipratropium-albuterol (DUO-NEB) 0.5-2.5 mg/3 mL nebulizer solution    lidocaine (LIDODERM) 5 %    melatonin 3 mg    methocarbamol (ROBAXIN) 500 mg tablet    metoprolol succinate (TOPROL-XL) 25 mg 24 hr tablet    NIFEdipine (PROCARDIA XL) 30 mg 24 hr tablet    oxyCODONE (ROXICODONE) 5 immediate release tablet    senna (SENOKOT) 8.6 mg    sevelamer (RENAGEL) 800 mg tablet    Allergies[3]    Social History:     Marital Status:     Substance Use History:   Social History     Substance and Sexual Activity   Alcohol Use Not Currently    Alcohol/week: 0.0 standard drinks of alcohol    Comment: 0     Tobacco Use History[4]  Social History     Substance and Sexual Activity   Drug Use Never       Family History:    Family History[5]      OBJECTIVE:    Vitals:   Blood Pressure: 152/59 (07/11/25 2126)  Pulse: 57 (07/11/25 2126)  Temperature: (!) 97.3 °F (36.3 °C) (07/11/25 2126)  Temp Source: Temporal (07/10/25 1300)  Respirations: 16 (07/11/25 1110)  Weight - Scale: 97 kg (213 lb 13.5 oz) (07/08/25 1113)  SpO2: 94 % (07/11/25 2126)    Physical Exam:    General Appearance: Alert, cooperative, no distress.  HEENT: Head normocephalic, atraumatic, without obvious abnormality.  Heart: Normal rate and rhythm.  Lungs: Non-labored breathing. No respiratory distress.  Abdomen: Without distension.  Psychiatric: AAOx3  Lower Extremity:    Vascular:   DP: Right: 1+ Left: 1+  PT: Right: 1+ Left: 1+  CRT < 3 seconds at the digits. +0/4 edema noted at bilateral lower extremities.   Pedal hair is absent.   Skin temperature is WNL bilaterally.    Musculoskeletal:  MMT is 5/5 in all muscle compartments  bilaterally.   No Pain on palpation of the bilateral foot.   History of right partial first ray amputation noted..     Dermatological:  Lower extremity wound(s) as noted below:    Wound #: 1  Location: left submet 5  Length 1.0cm: Width 1.0cm: Depth 0.4cm:   Deepest Tissue Noted in Base: subcutaneous tissue  Probe to Bone: No  Peripheral Skin Description: rolled edge  Granulation: 70% Fibrotic Tissue: 30% Necrotic Tissue: 0%   Drainage Amount: minimal, serous  Signs of Infection: No      Wound #: 2  Location: left anterior leg  Length 3.0cm: Width 1.5cm: Depth 0.1cm:   Deepest Tissue Noted in Base: dermis  Probe to Bone: No  Peripheral Skin Description: rolled edge  Granulation: 100% Fibrotic Tissue: 0% Necrotic Tissue: 0%   Drainage Amount: minimal, serous  Signs of Infection: No      Wound #: 3  Location: left medial hallux  Length 0.5cm: Width 0.5cm: Depth 0.1cm:   Deepest Tissue Noted in Base: subcutaneous tissue  Probe to Bone: No  Peripheral Skin Description: rolled edge  Granulation: 70% Fibrotic Tissue: 30% Necrotic Tissue: 0%   Drainage Amount: minimal, serous  Signs of Infection: No      Wound #: 4  Location: left medial 1st MTPJ  Length 0.2cm: Width 0.2cm: Depth 0.1cm:   Deepest Tissue Noted in Base: subcutaneous tissue  Probe to Bone: No  Peripheral Skin Description: rolled edge  Granulation: 30% Fibrotic Tissue: 70% Necrotic Tissue: 0%   Drainage Amount: minimal, serous  Signs of Infection: No      Neurological:  Gross sensation is diminished.   Light touch is diminished.   Protective sensation is diminished.      Additional data:     Lab Results: I have personally reviewed pertinent labs including:    Results from last 7 days   Lab Units 07/11/25  0630 07/09/25  0707 07/08/25  1144   WBC Thousand/uL 5.12   < > 5.83   HEMOGLOBIN g/dL 12.1   < > 10.3*   HEMATOCRIT % 40.1   < > 34.5*   PLATELETS Thousands/uL 212   < > 198   SEGS PCT %  --   --  58   LYMPHO PCT %  --   --  23   MONO PCT %  --   --  14*  "  EOS PCT %  --   --  4    < > = values in this interval not displayed.     Results from last 7 days   Lab Units 07/11/25  0445 07/09/25  0707 07/08/25  1144   POTASSIUM mmol/L 4.2   < > 5.7*   CHLORIDE mmol/L 98   < > 94*   CO2 mmol/L 24   < > 28   BUN mg/dL 18   < > 54*   CREATININE mg/dL 4.40*   < > 8.89*   CALCIUM mg/dL 8.5   < > 9.1   ALK PHOS U/L  --   --  78   ALT U/L  --   --  5*   AST U/L  --   --  8*    < > = values in this interval not displayed.     Results from last 7 days   Lab Units 07/08/25  1144   INR  1.09       Cultures: I have personally reviewed pertinent cultures including:              Imaging: I have personally reviewed pertinent reports in PACS.  EKG, Pathology, and Other Studies: I have personally reviewed pertinent reports.    Time Spent for Care: 30 minutes.  More than 50% of total time spent on counseling and coordination of care as described above.      ** Please Note: Portions of the record may have been created with voice recognition software. Occasional wrong word or \"sound a like\" substitutions may have occurred due to the inherent limitations of voice recognition software. Read the chart carefully and recognize, using context, where substitutions have occurred. **         [1]   Past Medical History:  Diagnosis Date    Acute cystitis 10/18/2024    Acute on chronic anemia 01/23/2022    Atrial fibrillation ([facility])     Bilateral sacral fracture, closed ([facility]) 07/03/2024    Bradycardia 09/05/2023    Diabetes mellitus (HCC)     Diabetes mellitus type 2 with peripheral artery disease ([facility])     ESRD (end stage renal disease) on dialysis ([facility])     Hematuria 10/10/2024    Hematuria 10/10/2024    Hyperkalemia 04/06/2024    Hyperkalemia 04/06/2024    Hyperlipidemia     Hypertension     Left toe amputee ([facility])     Subacute osteomyelitis of right foot ([facility])     TIA (transient ischemic attack)     Toxic metabolic encephalopathy 10/08/2024    Traumatic rhabdomyolysis ([facility]) 08/19/2023   [2]   Past " Surgical History:  Procedure Laterality Date    AMPUTATION Left     left foot resection 10 years ago dr tran    HEMODIALYSIS ADULT  1/18/2025    HEMODIALYSIS ADULT  2/27/2025    HEMODIALYSIS ADULT  6/4/2025    IR EMBOLIZATION (SPECIFY VESSEL OR SITE)  4/3/2025    IR EMBOLIZATION (SPECIFY VESSEL OR SITE)  4/7/2025    IR LOWER EXTREMITY ANGIOGRAM  08/29/2023    IR TEMPORARY DIALYSIS CATHETER PLACEMENT  08/25/2023    IR TUNNELED CENTRAL LINE REMOVAL  08/23/2023    IR TUNNELED DIALYSIS CATHETER PLACEMENT  01/27/2022    IR TUNNELED DIALYSIS CATHETER PLACEMENT  08/30/2023    TX AMPUTATION METATARSAL W/TOE SINGLE Right 8/31/2023    Procedure: RIGHT PARTIAL 1ST RAY RESECTION WITH  WOUND VAC APPLICATION;  Surgeon: Won Parmar DPM;  Location: [facility];  Service: Podiatry   [3] No Known Allergies  [4]   Social History  Tobacco Use   Smoking Status Never    Passive exposure: Never   Smokeless Tobacco Never   [5] No family history on file.

## 2025-07-12 NOTE — DISCHARGE SUMMARY
Discharge Summary - Hospitalist   Name: Naresh Carpio 65 y.o. male I MRN: [id number]  Unit/Bed#: [address] [place], [place] [id number] I Date of Admission: 7/8/2025   Date of Service: 7/12/2025 I Hospital Day: 3     Assessment & Plan  AMS (altered mental status)  Patient sent in from dialysis center for altered mental status and agitation.  CT head: No acute intracranial abnormality. Chronic microangiopathic changes.   Initial concern for uremia given patient has shortened or missed dialysis sessions past two weeks, however, per nephrology less likely uremia alone contributing  UA unrevealing, glucose stable  TSH, B12, ammonia WNL  No signs/symptoms of infection  Initially required restraints due to agitation  Neurology consulted  MRI brain ordered - patient declined; reports he is extremely claustrophobic even if he gets the IV ativan  Repeat CT head negative for acute intracranial abnormality  Patient appears back to baseline, pleasant, cooperative.  We discussed how missing dialysis causes the toxins in his body to build up and can cause him to have periods of agitation and fogginess.  Received dialysis while hospitalized; next dialysis Tuesday outpatient   Patient will be discharged to short-term rehab at [facility] today.  ESRD (end stage renal disease) ([facility])  Lab Results   Component Value Date    EGFR 9 07/12/2025    EGFR 13 07/11/2025    EGFR 8 07/10/2025    CREATININE 5.70 (H) 07/12/2025    CREATININE 4.40 (H) 07/11/2025    CREATININE 6.08 (H) 07/10/2025   For past two weeks patient has been shortening dialysis sessions  TTS schedule  Nephrology consulted for dialysis management  Question patient as to why he is shortening his dialysis treatments he indicated that the diabetic neuropathy in his feet begin to hurt tremendously when he is not able to put weight on them and that is why he has been cutting the session short.  We discussed the possibility of perhaps him getting a foot massager to utilize while having  dialysis to mitigate the neuropathy pain, he is agreeable to looking into this  Patient will be discharged to short-term rehab today  CHF (congestive heart failure) (HCC)  Wt Readings from Last 3 Encounters:   07/08/25 97 kg (213 lb 13.5 oz)   06/07/25 89.7 kg (197 lb 12 oz)   05/13/25 92.6 kg (204 lb 3.2 oz)     Hx G1DD with previous EF 60-65%  BNP 3081, noncompliant with dialysis sessions recently  Continue Lasix 20 mg BID, Toprol XL 25 mg BID  CT with bilateral pleural effusions, stable on room air  Dialysis during hospitalization, patient back on his TTS schedule, next dialysis on Tuesday outpatient clinic  Discharge to short-term rehab today  Abdominal pain  Morning of 7/10 patient had complained of abdominal pain  CT a/p showed interval decrease in size of subhepatic and left gluteal hematomas as well as chronic compression fractures  Continue scheduled tylenol, gabapentin, robaxin PRN, low dose oxycodone PRN  Currently no abdominal pain  Discharged to short-term rehab today  Anemia  Anemia chronic suspect secondary to ESRD  Hemoglobin stable  Routine lab work at [facility] and with dialysis clinic   Diabetic wound to left plantar foot  Wound care nurse noted full thickness diabetic wound to left plantar foot with black eschar and foul smelling serosanguinous drainage   Podiatry consulted, local wound care performed to left foot consisting of Dermagran, dry sterile dressing to plantar foot and left leg, Betadine, Adaptic and Drechsel dressing to left medial foot  Continue wound care at short-term rehab  At risk for acid-base imbalance    Nephrology following  Stable for discharge  HTN (hypertension)  Continue home nifedipine 30 mg daily, Toprol XL 25 mg BID, lasix 20 mg bid  Stable  Discharge to short-term rehab today  Paroxysmal atrial fibrillation (HCC)  Continue Toprol XL 25 mg BID  Previously on Eliquis, per chart review patient refusing to take Eliquis due to history of hematoma in April 2025  Secondary  hyperparathyroidism (HCC)  On sevelamer  Retinal detachment  CT head: Redemonstration of posterior right globe hyperdensity that again is suggestive of retinal detachment.   Patient and sister report he has surgery scheduled with ophthalmologist later this month in [place], July 30     Medical Problems       Resolved Problems  Date Reviewed: 7/12/2025   None       Discharging Physician / Practitioner: GRANT Valdivia  PCP: Jeffrey Jackson  Admission Date:   Admission Orders (From admission, onward)       Ordered        07/09/25 1333  INPATIENT ADMISSION  Once            07/08/25 1400  Place in Observation  Once                          Discharge Date: 07/12/25    Next Steps for Physician/AP Assuming Care:  Dialysis in the clinic TTS  Routine blood work at [facility]  Encourage patient to complete his dialysis treatments    Test Results Pending at Discharge (will require follow up):  None    Medication Changes for Discharge & Rationale:   None  See after visit summary for reconciled discharge medications provided to patient and/or family.     Consultations During Hospital Stay:  Nephrology  Neurology  Podiatry    Procedures Performed:   Hemodialysis    Significant Findings / Test Results:   Chest x-ray performed 7/9/2025 showed low lung volumes producing vascular crowding, moderate right pleural effusion and right base atelectasis as per radiology report.  CT of the head performed 7/8/2025 showed no acute intracranial abnormality, chronic microangiopathic changes, redemonstration of posterior right globe hyperdensity that again is suggestive of retinal detachment as per radiology report.  CT of abdomen pelvis performed on 7/10/2025 showed moderate to large and mild to moderate left pleural effusions increased from prior studies.  There is also diffuse subcutaneous and mesenteric edema suggestive of diffuse anasarca, and continued interval decrease in the size of the subhepatic and left gluteal hematomas as per  radiology report.  Left foot x-ray performed on 7/12/2025 shows no radiographic evidence of osteomyelitis, chronic changes as per radiology report    Incidental Findings:   None   None     Hospital Course:   Naresh Carpio is a 65 y.o. male patient PMH of A-fib, bradycardia, diabetes, ESRD on dialysis TTS, CAD, hyperkalemia, HLD, HTN, left toe amputation, right partial first ray resection, TIA, rhabdomyolysis who originally presented to the hospital on 7/8/2025 due to altered mental status changes with some agitation at dialysis clinic.  Dialysis clinic staff reported that patient's last dialysis prior to presentation was on Saturday which was only 35 minutes, previous dialysis session to that was Thursday for 2 hours and patient did not have dialysis on the Tuesday previous.  Patient's creatinine was noted to be 8.89, normally in the fours and fives.  He was brought to the emergency department for further evaluation and treatment.  CT of the head was performed which showed no acute intracranial abnormality, chronic microangiopathic changes, redemonstration of posterior right globe hyperdensity that is suggestive of retinal detachment as per radiology report.  Initially the patient was not able to participate in the review of system due to AMS suspected secondary to elevated creatinine.  He underwent emergent dialysis on afternoon of admission, continued with dialysis treatments on 7/9, 7/10 and 7/12.  Neurology had been consulted, CT of head showed no acute intracranial abnormalities, they recommended MRI however patient adamantly refused as he is extremely claustrophobic even if he receives IV Ativan.  Mentation returned to normal prior to discharge, discussion with patient as to why he shortens his dialysis sessions he indicated that he has terrible diabetic neuropathy in his feet and when he has to sit in the chair for an extended period of time not being able to put his feet on the floor the pain becomes too  much for him to bear.  We discussed possible strategies including a foot massager or compression socks to help mitigate the pain and help him tolerate the entire sessions to avoid him running into a situation that led to his admission this time.  The patient stated that he had no idea that the toxins could build up and cause him to have that altered mental status change and be aggressive which is not in his nature at all.  Patient is stable for discharge back to short-term rehab today.  Next dialysis in the clinic will be on Tuesday, July 15, 2025.          Please see above list of diagnoses and related plan for additional information.     Discharge Day Visit / Exam:   Subjective:    Patient seen sitting up in chair, reports that he is having some back discomfort.  No abdominal pain.  Did not sleep very well last night and is tired.  He just finished up his dialysis earlier.  We spoke of returning to short-term rehab today  Vitals: Blood Pressure: 142/53 (07/12/25 1511)  Pulse: 58 (07/12/25 1511)  Temperature: (!) 96.6 °F (35.9 °C) (07/12/25 1230)  Temp Source: Tympanic (07/12/25 1230)  Respirations: 16 (07/12/25 1230)  Weight - Scale: 97 kg (213 lb 13.5 oz) (07/08/25 1113)  SpO2: 94 % (07/12/25 1511)  Physical Exam  Vitals and nursing note reviewed.   Constitutional:       Comments: Tired appearing gentleman, chronically ill appearing   HENT:      Head: Normocephalic.      Nose: Nose normal.      Mouth/Throat:      Mouth: Mucous membranes are moist.     Eyes:      Extraocular Movements: Extraocular movements intact.      Conjunctiva/sclera: Conjunctivae normal.       Cardiovascular:      Rate and Rhythm: Normal rate.      Pulses: Normal pulses.   Pulmonary:      Effort: Pulmonary effort is normal.      Breath sounds: Normal breath sounds.   Abdominal:      General: There is no distension.      Palpations: Abdomen is soft.      Tenderness: There is no abdominal tenderness.   Genitourinary:     Comments: Voiding  spontaneously    Musculoskeletal:      Cervical back: Normal range of motion.      Comments: Has TLSO brace on     Skin:     Capillary Refill: Capillary refill takes less than 2 seconds.      Coloration: Skin is pale.      Comments: Bilateral LE dressings present      Neurological:      General: No focal deficit present.      Mental Status: He is oriented to person, place, and time.     Psychiatric:         Mood and Affect: Mood normal.         Behavior: Behavior normal.         Thought Content: Thought content normal.         Judgment: Judgment normal.          Discussion with Family: Attempted to update  (sister) via phone. Unable to contact.    Discharge instructions/Information to patient and family:   See after visit summary for information provided to patient and family.      Provisions for Follow-Up Care:  See after visit summary for information related to follow-up care and any pertinent home health orders.      Mobility at time of Discharge:   Basic Mobility Inpatient Raw Score: 14  JH-HLM Goal: 4: Move to chair/commode  JH-HLM Achieved: 5: Stand (1 or more minutes)  HLM Goal achieved. Continue to encourage appropriate mobility.     Disposition:   Other Skilled Nursing Facility at [facility]    Planned Readmission:  No     Administrative Statements   Discharge Statement:  I have spent a total time of greater than 45 minutes in caring for this patient on the day of the visit/encounter. >30 minutes of time was spent on: Diagnostic results, Risks and benefits of tx options, Instructions for management, Importance of tx compliance, Risk factor reductions, Impressions, Counseling / Coordination of care, Documenting in the medical record, Reviewing / ordering tests, medicine, procedures  , and Communicating with other healthcare professionals .    **Please Note: This note may have been constructed using a voice recognition system**

## 2025-07-12 NOTE — ASSESSMENT & PLAN NOTE
Wound care nurse noted full thickness diabetic wound to left plantar foot with black eschar and foul smelling serosanguinous drainage   Podiatry consulted, local wound care performed to left foot consisting of Dermagran, dry sterile dressing to plantar foot and left leg, Betadine, Adaptic and Drechsel dressing to left medial foot  Continue wound care at short-term rehab

## 2025-07-12 NOTE — PROGRESS NOTES
Progress Note - Nephrology   Name: Naresh Carpio 65 y.o. male I MRN: [id number]  Unit/Bed#: 4 [place] 414-01 I Date of Admission: 7/8/2025   Date of Service: 7/12/2025 I Hospital Day: 3    Assessment & Plan  ESRD (end stage renal disease) (HCC)  -Outpatient unit [facility] due to recent rehab admission.  Was prior at [facility]  - Access-right IJ PermCath  - Prior estimated dry weight on discharge in June 2025 was 89.5 kg but dry weight was being challenged with dialysis  - Noted patient only had partial treatment last Thursday and only had 35 minutes of treatment on Saturday.  Was sent to the hospital on July 8 with erratic behavior  - Outpatient schedule-TTS  - Patient has been noncompliant with outpatient dialysis per my discussion with outpatient dialysis nurse-for at least the past 2 weeks he has been shortening treatment to 2-hour treatments and this past Saturday only did 30-minute treatments  - 7/8-patient completed dialysis treatment for 3-hour treatment with lower flows as he has been shortening many treatments  - 7/9-plan for short dialysis treatment for clearance  - 7/10 - HD for regular treatment. K improved with HD yesterday. Na improving with UF with HD.  - 7/11-patient's mentation has improved and he is back to his baseline.  Alert and oriented x 3.  No longer agitated.  Sodium, potassium, bicarbonate appropriate.  Hemoglobin at goal.  - 7/12-dialysis    Plan  - Dialysis today-seen and examined on dialysis-sodium level 138, bicarbonate 35, F1 80 filter, 4-hour treatment, 2 to 3 L ultrafiltration, if tolerates,  - Patient's mentation has improved and he is back to baseline  - Retinal detachment concern on CT head-patient states he follows with ophthalmology as outpatient.  Reviewed with primary team advanced practitioner  - Please call the renal team if questions or issues arise tomorrow  Secondary hyperparathyroidism (HCC)  -Home regimen includes sevelamer--> phos improving and stable  7/11 and no changes in phosphorus stable on 7/12  At risk for acid-base imbalance  -Bicarbonate appropriate 7/12  Electrolyte imbalance risk  - Initially hyperkalemic.  Resolved with dialysis.  Potassium appropriate 7/12  HTN (hypertension)  -Prior was on nifedipine 30 mg daily and furosemide 20 mg twice daily which was adjusted in June 2025 to metoprolol 25 mg twice daily, nifedipine 30 mg daily  - To note, patient was noted to have bradycardia as outpatient?  Will need to confirm if patient is on beta-blocker.  Currently heart rate is okay  CHF (congestive heart failure) ([facility])  -Patient has a history of grade 1 diastolic dysfunction.  Prior echocardiogram with EF 60 to 65%  -euvolemic clinically but we will attempt 2 to 3 L ultrafiltration as the patient had intermittent episodes of hypoxia overnight and does gain weight between dialysis treatments though currently is below dry weight          Anemia  -Hold for now given mentation changes  - Is on Mircera and Venofer as outpatient  Agitation  Etiology unclear-likely uremia.  Resolved with dialysis treatments.  - CT head  unrevealing for acute issue  - Urinalysis unrevealing  - There may be a uremic component as the patient has had poor dialysis over the past at least 2 weeks.  Now that the patient uremia has resolved and mentation has improved and he is oriented x 3, he states that he has been shortening his treatments due to lower extremity pain and neuropathy.  - neuro on board  - repeat CT head -   - repeat carotid u/s -   AMS (altered mental status)  -Evaluation in progress per primary team  Paroxysmal atrial fibrillation (HCC)  Per primary team  Abdominal pain  Resolved and management per primary team  Diabetic wound to left plantar foot  Resolved and management per primary team  Retinal detachment  Per primary team    I have reviewed the nephrology recommendations including dialysis today with primary team advanced practitioner, and we are in agreement with  renal plan including the information outlined above.     Subjective   Brief History of Admission - Balaji is a 65 y.o. male with past medical history of ESRD on hemodialysis at [facility], recent hospitalization in June 2025 with respiratory failure in the setting of volume overload/COVID-19 infection, hypertension, retinopathy, diastolic heart failure, paroxysmal A-fib, prior history of retroperitoneal hematoma in April after a fall and underwent IR embolization of distal right deep circumflex iliac artery and superior right gluteal artery, who was admitted to [facility] 7/8 after presenting with agitation and confusion. A renal consultation is requested today for assistance in the management of ESRD. Patient is not providing adequate history. I called [facility] dialysis unit and spoke with the nurse. Per nursing team, patient was aggressive and agitated and confused and was sent to the ER. This has been an ongoing issue over the past 1 to 2 weeks. He has been shortening his diet treatments to approximately 2-hour treatments for the past 2 weeks and this past Saturday he only did 30 minutes of treatment. The patient states that he is agreeable to the dialysis but he has no answer any other questions..   Past 24 hours  -No new acute issues.  Seen on dialysis.  Blood pressures 130s systolic.  Afebrile noted to have intermittent episodes of hypoxia..    Objective :  Temp:  [96.8 °F (36 °C)-97.9 °F (36.6 °C)] 96.8 °F (36 °C)  HR:  [50-66] 50  BP: (130-152)/() 139/60  Resp:  [14-18] 14  SpO2:  [73 %-97 %] 80 %  O2 Device: None (Room air)    Current Weight: Weight - Scale: 97 kg (213 lb 13.5 oz)  First Weight: Weight - Scale: 97 kg (213 lb 13.5 oz)  I/O         07/10 0701 07/11 0700 07/11 0701  07/12 0700 07/12 0701  07/13 0700    P.O. 50 350     I.V. (mL/kg) 510 (5.3)  200 (2.1)    Total Intake(mL/kg) 560 (5.8) 350 (3.6) 200 (2.1)    Urine (mL/kg/hr) 500 (0.2) 150 (0.1)     Other 1500   "    Total Output 2000 150     Net -1440 +200 +200           Unmeasured Urine Occurrence 100 x            Physical Exam  General: NAD, chronically ill-appearing  Skin: no rash  Eyes: anicteric sclera  ENT: moist mucous membrane  Neck: supple  Chest: CTA b/l, no ronchii, no wheeze, no rubs, no rales  CVS: s1s2, no murmur, no gallop, no rub  Abdomen: soft, nontender, nl sounds  Extremities: no significant pitting edema LE b/l, right IJ tunneled catheter  : no gaitan  Neuro: AAOX3  Psych: normal affect    Medications:  Current Medications[1]      Lab Results: I have reviewed the following results:  Results from last 7 days   Lab Units 07/12/25  0446 07/11/25  0630 07/11/25  0445 07/10/25  0616 07/09/25  0707 07/08/25  1144   WBC Thousand/uL 6.64 5.12  --  7.59 6.85 5.83   HEMOGLOBIN g/dL 12.5 12.1  --  11.8* 11.4* 10.3*   HEMATOCRIT % 41.7 40.1  --  39.5 37.9 34.5*   PLATELETS Thousands/uL 248 212  --  213 202 198   POTASSIUM mmol/L 4.3  --  4.2 4.5 5.5* 5.7*   CHLORIDE mmol/L 94*  --  98 97 96 94*   CO2 mmol/L 28  --  24 22 25 28   BUN mg/dL 30*  --  18 29* 35* 54*   CREATININE mg/dL 5.70*  --  4.40* 6.08* 6.98* 8.89*   CALCIUM mg/dL 8.8  --  8.5 8.4 8.7 9.1   MAGNESIUM mg/dL 2.3  --   --  2.1 2.2  --    PHOSPHORUS mg/dL 4.2*  --   --  4.3* 4.4*  --    ALBUMIN g/dL  --   --   --   --   --  3.6       Administrative Statements     Portions of the record may have been created with voice recognition software. Occasional wrong word or \"sound a like\" substitutions may have occurred due to the inherent limitations of voice recognition software. Read the chart carefully and recognize, using context, where substitutions have occurred.If you have any questions, please contact the dictating provider.       [1]   Current Facility-Administered Medications:     acetaminophen (TYLENOL) tablet 975 mg, 975 mg, Oral, Q8H Nallely SOMMER PA-C, 975 mg at 07/12/25 0547    albuterol inhalation solution 2.5 mg, 2.5 mg, Nebulization, Q6H " PRN, Nirav Bruce MD    allopurinol (ZYLOPRIM) tablet 100 mg, 100 mg, Oral, Daily, GRANT Valdivia, 100 mg at 07/12/25 0819    aspirin chewable tablet 81 mg, 81 mg, Oral, Daily, GRANT Valdivia, 81 mg at 07/12/25 0819    atorvastatin (LIPITOR) tablet 80 mg, 80 mg, Oral, Daily With Dinner, GRANT Valdivia, 80 mg at 07/11/25 1549    famotidine (PEPCID) tablet 20 mg, 20 mg, Oral, HS, GRANT Valdivia, 20 mg at 07/11/25 2129    furosemide (LASIX) tablet 20 mg, 20 mg, Oral, BID, GRANT Valdivia, 20 mg at 07/12/25 0819    gabapentin (NEURONTIN) capsule 100 mg, 100 mg, Oral, Once per day on Monday Wednesday Friday, GRANT Valdivia, 100 mg at 07/11/25 0854    heparin (porcine) subcutaneous injection 5,000 Units, 5,000 Units, Subcutaneous, Q8H KEMAL, GRANT Valdivia, 5,000 Units at 07/12/25 0547    hydrALAZINE (APRESOLINE) injection 5 mg, 5 mg, Intravenous, Q6H PRN, Nallely Pisano PA-C, 5 mg at 07/09/25 1604    lidocaine (LIDODERM) 5 % patch 1 patch, 1 patch, Topical, Daily, GRANT Valdivia, 1 patch at 07/11/25 0854    melatonin tablet 6 mg, 6 mg, Oral, HS, GRANT Valdivia, 6 mg at 07/11/25 2129    methocarbamol (ROBAXIN) tablet 500 mg, 500 mg, Oral, TID PRN, GRANT Valdivia, 500 mg at 07/12/25 0547    metoprolol succinate (TOPROL-XL) 24 hr tablet 25 mg, 25 mg, Oral, Q12H KEMAL, GRANT Valdivia, 25 mg at 07/12/25 0819    mupirocin (BACTROBAN) 2 % nasal ointment, , Nasal, Q12H KEMAL, GRANT Valdivia, 1 Application at 07/12/25 0821    NIFEdipine (PROCARDIA XL) 24 hr tablet 30 mg, 30 mg, Oral, After Dinner, GRANT Valdivia, 30 mg at 07/11/25 1841    ondansetron (ZOFRAN-ODT) dispersible tablet 4 mg, 4 mg, Oral, Q6H PRN, GRANT Valdivia, 4 mg at 07/12/25 0913    oxyCODONE (ROXICODONE) split tablet 2.5 mg, 2.5 mg, Oral, Q6H PRN, Nallely Pisano,  PA-C, 2.5 mg at 07/12/25 0818    senna (SENOKOT) tablet 8.6 mg, 8.6 mg, Oral, Daily With Lunch, GRANT Valdivia, 8.6 mg at 07/11/25 1140    sevelamer (RENAGEL) tablet 1,600 mg, 1,600 mg, Oral, TID With Meals, GRANT Valdivia, 1,600 mg at 07/12/25 0819

## 2025-07-12 NOTE — ASSESSMENT & PLAN NOTE
Patient sent in from dialysis center for altered mental status and agitation.  CT head: No acute intracranial abnormality. Chronic microangiopathic changes.   Initial concern for uremia given patient has shortened or missed dialysis sessions past two weeks, however, per nephrology less likely uremia alone contributing  UA unrevealing, glucose stable  TSH, B12, ammonia WNL  No signs/symptoms of infection  Initially required restraints due to agitation  Neurology consulted  MRI brain ordered - patient declined; reports he is extremely claustrophobic even if he gets the IV ativan  Repeat CT head negative for acute intracranial abnormality  Patient appears back to baseline, pleasant, cooperative.  We discussed how missing dialysis causes the toxins in his body to build up and can cause him to have periods of agitation and fogginess.  Received dialysis while hospitalized; next dialysis Tuesday outpatient   Patient will be discharged to short-term rehab at [facility] today.

## 2025-07-12 NOTE — CASE MANAGEMENT
Case Management Discharge Planning Note    Patient name Naresh Carpio  Location 4 [place] 414/4 [place] 414-* MRN [id number]  : 1960 Date 2025       Current Admission Date: 2025  Current Admission Diagnosis:AMS (altered mental status)   Patient Active Problem List    Diagnosis Date Noted    Abdominal pain 07/10/2025    Diabetic wound to left plantar foot 07/10/2025    Retinal detachment 07/10/2025    HTN (hypertension) 2025    CHF (congestive heart failure) (HCC) 2025    Anemia 2025    Agitation 2025    Abnormal urinalysis 2025    Metabolic encephalopathy 2025    Proliferative diabetic retinopathy of both eyes with macular edema associated with type 2 diabetes mellitus ([facility]) 2025    COVID-19 2025    Bilateral leg edema 2025    Ambulatory dysfunction 2025    Retinopathy 2025    At risk for acid-base imbalance 2025    Electrolyte imbalance risk 2025    Hallucinations 2025    Type 2 diabetes mellitus with foot ulcer, without long-term current use of insulin ([facility]) 2025    Insomnia 2025    Hyperphosphatemia 2025    Wound of skin 2025    At high risk for skin breakdown 2025    At risk for venous thromboembolism (VTE) 2025    Impaired mobility and activities of daily living 2025    Visual disturbance 2025    Pseudoaneurysm (HCC) 2025    Acute pain due to trauma 2025    ESRD on dialysis ([facility]) 2025    Wounds, multiple 2025    Traumatic retroperitoneal hemorrhage 2025    Secondary hyperparathyroidism ([facility]) 2025    At risk for electrolyte imbalance 2025    Multiple open wounds of foot 2025    Disorder of acid-base balance 2025    L2 vertebral fracture (HCC) 2025    Non-compliance with treatment 2025    Generalized weakness 2025    Gout 2025    Closed fracture of proximal end of left humerus with routine  healing 02/10/2025    Left shoulder pain 02/08/2025    ESRD (end stage renal disease) on dialysis (formerly [facility]) 01/16/2025    Primary hypertension 01/16/2025    Anemia in ESRD (end-stage renal disease)  (formerly [facility]) 01/16/2025    Chronic kidney disease-mineral and bone disorder 01/16/2025    Renal lesion 12/19/2024    Chronic wound 11/20/2024    Acute respiratory failure with hypoxia ([facility]) 11/19/2024    Volume overload secondary to noncompliance with hemodialysis 11/19/2024    Pleural effusion 11/19/2024    Elevated troponin 11/19/2024    Paroxysmal atrial fibrillation ([facility])     ESRD (end stage renal disease) (formerly [facility]) 10/25/2024    Hyponatremia 10/19/2024    Acute on chronic anemia 10/14/2024    PAD (peripheral artery disease) (formerly [facility]) 10/08/2024    Hyperkalemia 04/06/2024    Difficulty with speech 03/19/2024    History of amputation of hallux ([facility]) 03/18/2024    At risk for constipation 09/06/2023    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 mellitus, with necrosis of bone ([facility])     Acquired deformity of foot, right     Charcot's joint     Open wound of right foot 08/21/2023    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 mellitus, with bone involvement without evidence of necrosis ([facility])     Diabetic ulcer of left midfoot associated with type 2 diabetes mellitus, limited to breakdown of skin ([facility])     Diabetic polyneuropathy associated with type 2 diabetes mellitus ([facility])     History of amputation of lesser toe of left foot ([facility])     Onychomycosis     AMS (altered mental status) 08/20/2023    Traumatic retroperitoneal hematoma 08/19/2023    Osteoarthritis of left hip 07/27/2022    Severe Left Orchalgia 07/26/2022    Right shoulder pain 07/26/2022    SIRS (systemic inflammatory response syndrome) ([facility]) 07/26/2022    Left hip pain 07/06/2022    Hemodialysis status (formerly [facility])     Hypervolemia     Chronic anemia 01/21/2022    History of prediabetes     Essential hypertension     History of TIA (transient  ischemic attack)     T10 vertebral fracture (HCC)       LOS (days): 3  Geometric Mean LOS (GMLOS) (days): 3.7  Days to GMLOS:0.7     OBJECTIVE:  Risk of Unplanned Readmission Score: 70.35         Current admission status: Inpatient   Preferred Pharmacy:   [facility] [id number] - [place], [place] - [address]  [address]  [facility] [address]  Phone: [phone] Fax: [phone]    [id number] - [place], [place] - [address]  [address]  [facility] [address]  Phone: [phone] Fax: [phone]    Primary Care Provider: Jeffrey Jackson    Primary Insurance: MEDICARE  Secondary Insurance:     DISCHARGE DETAILS:    Other Referral/Resources/Interventions Provided:  Interventions: Transportation  Referral Comments: Call made to [facility]. SW advised nurse Faye of scheduled pickup time to return to facility today.     Treatment Team Recommendation: Facility Return  Expected Discharge Disposition: Facility Return     Transport at Discharge : Wheelchair van     Number/Name of Dispatcher: [facility]  Transported by (Company and Unit #): [employer]  ETA of Transport (Date): 07/12/25  ETA of Transport (Time): 1730    Accepting Facility Name, City & State : [facility]  Receiving Facility/Agency Phone Number: [phone]

## 2025-07-12 NOTE — ASSESSMENT & PLAN NOTE
Anemia chronic suspect secondary to ESRD  Hemoglobin stable  Routine lab work at [facility] and with dialysis clinic

## 2025-07-20 ENCOUNTER — APPOINTMENT (EMERGENCY)
Dept: RADIOLOGY | Facility: HOSPITAL | Age: 65
DRG: 640 | End: 2025-07-20
Payer: MEDICARE

## 2025-07-20 ENCOUNTER — APPOINTMENT (OUTPATIENT)
Dept: DIALYSIS | Facility: HOSPITAL | Age: 65
DRG: 640 | End: 2025-07-20
Payer: MEDICARE

## 2025-07-20 ENCOUNTER — HOSPITAL ENCOUNTER (INPATIENT)
Facility: HOSPITAL | Age: 65
LOS: 3 days | DRG: 640 | End: 2025-07-24
Attending: STUDENT IN AN ORGANIZED HEALTH CARE EDUCATION/TRAINING PROGRAM | Admitting: INTERNAL MEDICINE
Payer: MEDICARE

## 2025-07-20 DIAGNOSIS — N18.6 ESRD (END STAGE RENAL DISEASE) (HCC): ICD-10-CM

## 2025-07-20 DIAGNOSIS — R41.82 ALTERED MENTAL STATUS: ICD-10-CM

## 2025-07-20 DIAGNOSIS — N18.6 ESRD ON DIALYSIS (HCC): Primary | ICD-10-CM

## 2025-07-20 DIAGNOSIS — S91.302S WOUND, OPEN, FOOT, LEFT, SEQUELA: ICD-10-CM

## 2025-07-20 DIAGNOSIS — G47.00 INSOMNIA, UNSPECIFIED TYPE: ICD-10-CM

## 2025-07-20 DIAGNOSIS — R09.02 HYPOXIA: ICD-10-CM

## 2025-07-20 DIAGNOSIS — Z99.2 ESRD ON DIALYSIS (HCC): Primary | ICD-10-CM

## 2025-07-20 PROBLEM — G93.40 ENCEPHALOPATHY: Status: ACTIVE | Noted: 2024-10-08

## 2025-07-20 LAB
2HR DELTA HS TROPONIN: -2 NG/L
4HR DELTA HS TROPONIN: 5 NG/L
ALBUMIN SERPL BCG-MCNC: 3.4 G/DL (ref 3.5–5)
ALP SERPL-CCNC: 89 U/L (ref 34–104)
ALT SERPL W P-5'-P-CCNC: 10 U/L (ref 7–52)
AMMONIA PLAS-SCNC: 28 UMOL/L (ref 18–72)
ANION GAP SERPL CALCULATED.3IONS-SCNC: 9 MMOL/L (ref 4–13)
APAP SERPL-MCNC: <2 UG/ML (ref 10–20)
AST SERPL W P-5'-P-CCNC: 13 U/L (ref 13–39)
BASE EX.OXY STD BLDV CALC-SCNC: 79 % (ref 60–80)
BASE EXCESS BLDV CALC-SCNC: 3.5 MMOL/L
BASOPHILS # BLD AUTO: 0.03 THOUSANDS/ÂΜL (ref 0–0.1)
BASOPHILS NFR BLD AUTO: 0 % (ref 0–1)
BILIRUB SERPL-MCNC: 0.45 MG/DL (ref 0.2–1)
BUN SERPL-MCNC: 37 MG/DL (ref 5–25)
CALCIUM ALBUM COR SERPL-MCNC: 9.1 MG/DL (ref 8.3–10.1)
CALCIUM SERPL-MCNC: 8.6 MG/DL (ref 8.4–10.2)
CARDIAC TROPONIN I PNL SERPL HS: 33 NG/L (ref ?–50)
CARDIAC TROPONIN I PNL SERPL HS: 35 NG/L (ref ?–50)
CARDIAC TROPONIN I PNL SERPL HS: 40 NG/L (ref ?–50)
CHLORIDE SERPL-SCNC: 97 MMOL/L (ref 96–108)
CO2 SERPL-SCNC: 27 MMOL/L (ref 21–32)
CREAT SERPL-MCNC: 5.61 MG/DL (ref 0.6–1.3)
EOSINOPHIL # BLD AUTO: 0.15 THOUSAND/ÂΜL (ref 0–0.61)
EOSINOPHIL NFR BLD AUTO: 2 % (ref 0–6)
ERYTHROCYTE [DISTWIDTH] IN BLOOD BY AUTOMATED COUNT: 15.6 % (ref 11.6–15.1)
ETHANOL SERPL-MCNC: <10 MG/DL
GFR SERPL CREATININE-BSD FRML MDRD: 9 ML/MIN/1.73SQ M
GLUCOSE SERPL-MCNC: 104 MG/DL (ref 65–140)
GLUCOSE SERPL-MCNC: 108 MG/DL (ref 65–140)
HCO3 BLDV-SCNC: 27.9 MMOL/L (ref 24–30)
HCT VFR BLD AUTO: 31.9 % (ref 36.5–49.3)
HGB BLD-MCNC: 9.9 G/DL (ref 12–17)
IMM GRANULOCYTES # BLD AUTO: 0.02 THOUSAND/UL (ref 0–0.2)
IMM GRANULOCYTES NFR BLD AUTO: 0 % (ref 0–2)
LYMPHOCYTES # BLD AUTO: 0.81 THOUSANDS/ÂΜL (ref 0.6–4.47)
LYMPHOCYTES NFR BLD AUTO: 10 % (ref 14–44)
MCH RBC QN AUTO: 28.5 PG (ref 26.8–34.3)
MCHC RBC AUTO-ENTMCNC: 31 G/DL (ref 31.4–37.4)
MCV RBC AUTO: 92 FL (ref 82–98)
MONOCYTES # BLD AUTO: 0.99 THOUSAND/ÂΜL (ref 0.17–1.22)
MONOCYTES NFR BLD AUTO: 12 % (ref 4–12)
NEUTROPHILS # BLD AUTO: 6.32 THOUSANDS/ÂΜL (ref 1.85–7.62)
NEUTS SEG NFR BLD AUTO: 76 % (ref 43–75)
NRBC BLD AUTO-RTO: 0 /100 WBCS
O2 CT BLDV-SCNC: 12.2 ML/DL
PCO2 BLDV: 41.3 MM HG (ref 42–50)
PH BLDV: 7.45 [PH] (ref 7.3–7.4)
PLATELET # BLD AUTO: 219 THOUSANDS/UL (ref 149–390)
PMV BLD AUTO: 9.2 FL (ref 8.9–12.7)
PO2 BLDV: 45.4 MM HG (ref 35–45)
POTASSIUM SERPL-SCNC: 5.4 MMOL/L (ref 3.5–5.3)
PROT SERPL-MCNC: 6.6 G/DL (ref 6.4–8.4)
RBC # BLD AUTO: 3.47 MILLION/UL (ref 3.88–5.62)
SALICYLATES SERPL-MCNC: <5 MG/DL (ref 5–20)
SODIUM SERPL-SCNC: 133 MMOL/L (ref 135–147)
TSH SERPL DL<=0.05 MIU/L-ACNC: 1.95 UIU/ML (ref 0.45–4.5)
WBC # BLD AUTO: 8.32 THOUSAND/UL (ref 4.31–10.16)

## 2025-07-20 PROCEDURE — 80053 COMPREHEN METABOLIC PANEL: CPT | Performed by: STUDENT IN AN ORGANIZED HEALTH CARE EDUCATION/TRAINING PROGRAM

## 2025-07-20 PROCEDURE — 82077 ASSAY SPEC XCP UR&BREATH IA: CPT | Performed by: STUDENT IN AN ORGANIZED HEALTH CARE EDUCATION/TRAINING PROGRAM

## 2025-07-20 PROCEDURE — 85025 COMPLETE CBC W/AUTO DIFF WBC: CPT | Performed by: STUDENT IN AN ORGANIZED HEALTH CARE EDUCATION/TRAINING PROGRAM

## 2025-07-20 PROCEDURE — 93005 ELECTROCARDIOGRAM TRACING: CPT

## 2025-07-20 PROCEDURE — G0257 UNSCHED DIALYSIS ESRD PT HOS: HCPCS

## 2025-07-20 PROCEDURE — 82948 REAGENT STRIP/BLOOD GLUCOSE: CPT

## 2025-07-20 PROCEDURE — 84484 ASSAY OF TROPONIN QUANT: CPT | Performed by: STUDENT IN AN ORGANIZED HEALTH CARE EDUCATION/TRAINING PROGRAM

## 2025-07-20 PROCEDURE — 99284 EMERGENCY DEPT VISIT MOD MDM: CPT | Performed by: INTERNAL MEDICINE

## 2025-07-20 PROCEDURE — 70450 CT HEAD/BRAIN W/O DYE: CPT

## 2025-07-20 PROCEDURE — 99223 1ST HOSP IP/OBS HIGH 75: CPT | Performed by: INTERNAL MEDICINE

## 2025-07-20 PROCEDURE — 5A1D70Z PERFORMANCE OF URINARY FILTRATION, INTERMITTENT, LESS THAN 6 HOURS PER DAY: ICD-10-PCS

## 2025-07-20 PROCEDURE — 82140 ASSAY OF AMMONIA: CPT | Performed by: STUDENT IN AN ORGANIZED HEALTH CARE EDUCATION/TRAINING PROGRAM

## 2025-07-20 PROCEDURE — 94760 N-INVAS EAR/PLS OXIMETRY 1: CPT

## 2025-07-20 PROCEDURE — 80179 DRUG ASSAY SALICYLATE: CPT | Performed by: STUDENT IN AN ORGANIZED HEALTH CARE EDUCATION/TRAINING PROGRAM

## 2025-07-20 PROCEDURE — 99285 EMERGENCY DEPT VISIT HI MDM: CPT | Performed by: STUDENT IN AN ORGANIZED HEALTH CARE EDUCATION/TRAINING PROGRAM

## 2025-07-20 PROCEDURE — 94640 AIRWAY INHALATION TREATMENT: CPT

## 2025-07-20 PROCEDURE — 84443 ASSAY THYROID STIM HORMONE: CPT | Performed by: STUDENT IN AN ORGANIZED HEALTH CARE EDUCATION/TRAINING PROGRAM

## 2025-07-20 PROCEDURE — 71045 X-RAY EXAM CHEST 1 VIEW: CPT

## 2025-07-20 PROCEDURE — 82805 BLOOD GASES W/O2 SATURATION: CPT | Performed by: STUDENT IN AN ORGANIZED HEALTH CARE EDUCATION/TRAINING PROGRAM

## 2025-07-20 PROCEDURE — 99285 EMERGENCY DEPT VISIT HI MDM: CPT

## 2025-07-20 PROCEDURE — 36415 COLL VENOUS BLD VENIPUNCTURE: CPT | Performed by: STUDENT IN AN ORGANIZED HEALTH CARE EDUCATION/TRAINING PROGRAM

## 2025-07-20 PROCEDURE — 80143 DRUG ASSAY ACETAMINOPHEN: CPT | Performed by: STUDENT IN AN ORGANIZED HEALTH CARE EDUCATION/TRAINING PROGRAM

## 2025-07-20 PROCEDURE — 87081 CULTURE SCREEN ONLY: CPT | Performed by: INTERNAL MEDICINE

## 2025-07-20 RX ORDER — ASPIRIN 81 MG/1
81 TABLET, CHEWABLE ORAL DAILY
Status: DISCONTINUED | OUTPATIENT
Start: 2025-07-20 | End: 2025-07-24 | Stop reason: HOSPADM

## 2025-07-20 RX ORDER — FAMOTIDINE 20 MG/1
20 TABLET, FILM COATED ORAL
Status: DISCONTINUED | OUTPATIENT
Start: 2025-07-20 | End: 2025-07-24 | Stop reason: HOSPADM

## 2025-07-20 RX ORDER — ACETAMINOPHEN 325 MG/1
650 TABLET ORAL EVERY 6 HOURS PRN
Status: DISCONTINUED | OUTPATIENT
Start: 2025-07-20 | End: 2025-07-24 | Stop reason: HOSPADM

## 2025-07-20 RX ORDER — ALLOPURINOL 100 MG/1
100 TABLET ORAL DAILY
Status: DISCONTINUED | OUTPATIENT
Start: 2025-07-20 | End: 2025-07-24 | Stop reason: HOSPADM

## 2025-07-20 RX ORDER — ONDANSETRON 2 MG/ML
4 INJECTION INTRAMUSCULAR; INTRAVENOUS EVERY 6 HOURS PRN
Status: DISCONTINUED | OUTPATIENT
Start: 2025-07-20 | End: 2025-07-24 | Stop reason: HOSPADM

## 2025-07-20 RX ORDER — NIFEDIPINE 30 MG/1
30 TABLET, EXTENDED RELEASE ORAL
Status: DISCONTINUED | OUTPATIENT
Start: 2025-07-20 | End: 2025-07-24 | Stop reason: HOSPADM

## 2025-07-20 RX ORDER — METOPROLOL SUCCINATE 25 MG/1
25 TABLET, EXTENDED RELEASE ORAL EVERY 12 HOURS SCHEDULED
Status: DISCONTINUED | OUTPATIENT
Start: 2025-07-20 | End: 2025-07-24 | Stop reason: HOSPADM

## 2025-07-20 RX ORDER — HEPARIN SODIUM 5000 [USP'U]/ML
5000 INJECTION, SOLUTION INTRAVENOUS; SUBCUTANEOUS EVERY 8 HOURS SCHEDULED
Status: DISCONTINUED | OUTPATIENT
Start: 2025-07-20 | End: 2025-07-24 | Stop reason: HOSPADM

## 2025-07-20 RX ORDER — IPRATROPIUM BROMIDE AND ALBUTEROL SULFATE 2.5; .5 MG/3ML; MG/3ML
3 SOLUTION RESPIRATORY (INHALATION)
Status: DISCONTINUED | OUTPATIENT
Start: 2025-07-21 | End: 2025-07-23

## 2025-07-20 RX ORDER — ATORVASTATIN CALCIUM 80 MG/1
80 TABLET, FILM COATED ORAL
Status: DISCONTINUED | OUTPATIENT
Start: 2025-07-20 | End: 2025-07-24 | Stop reason: HOSPADM

## 2025-07-20 RX ORDER — IPRATROPIUM BROMIDE AND ALBUTEROL SULFATE 2.5; .5 MG/3ML; MG/3ML
3 SOLUTION RESPIRATORY (INHALATION)
Status: DISCONTINUED | OUTPATIENT
Start: 2025-07-20 | End: 2025-07-20

## 2025-07-20 RX ORDER — GABAPENTIN 100 MG/1
100 CAPSULE ORAL 3 TIMES WEEKLY
Status: DISCONTINUED | OUTPATIENT
Start: 2025-07-21 | End: 2025-07-21

## 2025-07-20 RX ORDER — FUROSEMIDE 20 MG/1
20 TABLET ORAL
Status: DISCONTINUED | OUTPATIENT
Start: 2025-07-20 | End: 2025-07-24 | Stop reason: HOSPADM

## 2025-07-20 RX ORDER — SENNOSIDES 8.6 MG
8.6 TABLET ORAL
Status: DISCONTINUED | OUTPATIENT
Start: 2025-07-20 | End: 2025-07-24 | Stop reason: HOSPADM

## 2025-07-20 RX ORDER — SEVELAMER HYDROCHLORIDE 800 MG/1
1600 TABLET, FILM COATED ORAL
Status: DISCONTINUED | OUTPATIENT
Start: 2025-07-20 | End: 2025-07-24 | Stop reason: HOSPADM

## 2025-07-20 RX ADMIN — IPRATROPIUM BROMIDE AND ALBUTEROL SULFATE 3 ML: .5; 3 SOLUTION RESPIRATORY (INHALATION) at 12:02

## 2025-07-20 RX ADMIN — FAMOTIDINE 20 MG: 20 TABLET, FILM COATED ORAL at 21:05

## 2025-07-20 RX ADMIN — FUROSEMIDE 20 MG: 20 TABLET ORAL at 09:12

## 2025-07-20 RX ADMIN — ACETAMINOPHEN 650 MG: 325 TABLET, FILM COATED ORAL at 11:25

## 2025-07-20 RX ADMIN — ALLOPURINOL 100 MG: 100 TABLET ORAL at 08:58

## 2025-07-20 RX ADMIN — FUROSEMIDE 20 MG: 20 TABLET ORAL at 17:00

## 2025-07-20 RX ADMIN — HEPARIN SODIUM 5000 UNITS: 5000 INJECTION INTRAVENOUS; SUBCUTANEOUS at 08:58

## 2025-07-20 RX ADMIN — HEPARIN SODIUM 5000 UNITS: 5000 INJECTION INTRAVENOUS; SUBCUTANEOUS at 21:05

## 2025-07-20 RX ADMIN — METOPROLOL SUCCINATE 25 MG: 25 TABLET, EXTENDED RELEASE ORAL at 21:04

## 2025-07-20 RX ADMIN — IPRATROPIUM BROMIDE AND ALBUTEROL SULFATE 3 ML: .5; 3 SOLUTION RESPIRATORY (INHALATION) at 19:32

## 2025-07-20 RX ADMIN — IPRATROPIUM BROMIDE AND ALBUTEROL SULFATE 3 ML: .5; 3 SOLUTION RESPIRATORY (INHALATION) at 16:04

## 2025-07-20 RX ADMIN — SENNOSIDES 8.6 MG: 8.6 TABLET, FILM COATED ORAL at 11:25

## 2025-07-20 RX ADMIN — HEPARIN SODIUM 5000 UNITS: 5000 INJECTION INTRAVENOUS; SUBCUTANEOUS at 14:22

## 2025-07-20 RX ADMIN — ACETAMINOPHEN 650 MG: 325 TABLET, FILM COATED ORAL at 21:05

## 2025-07-20 RX ADMIN — SEVELAMER HYDROCHLORIDE 1600 MG: 800 TABLET ORAL at 11:25

## 2025-07-20 RX ADMIN — NIFEDIPINE 30 MG: 30 TABLET, EXTENDED RELEASE ORAL at 18:19

## 2025-07-20 RX ADMIN — SEVELAMER HYDROCHLORIDE 1600 MG: 800 TABLET ORAL at 16:59

## 2025-07-20 RX ADMIN — ASPIRIN 81 MG: 81 TABLET, CHEWABLE ORAL at 09:12

## 2025-07-20 RX ADMIN — METOPROLOL SUCCINATE 25 MG: 25 TABLET, EXTENDED RELEASE ORAL at 09:12

## 2025-07-20 RX ADMIN — ATORVASTATIN CALCIUM 80 MG: 80 TABLET, FILM COATED ORAL at 16:58

## 2025-07-20 RX ADMIN — Medication 2.5 MG: at 17:02

## 2025-07-20 NOTE — ASSESSMENT & PLAN NOTE
Lab Results   Component Value Date    EGFR 9 07/20/2025    EGFR 9 07/12/2025    EGFR 13 07/11/2025    CREATININE 5.61 (H) 07/20/2025    CREATININE 5.70 (H) 07/12/2025    CREATININE 4.40 (H) 07/11/2025   As above, urgent HD today  Appreciate nephrology recommendations   continue home binders

## 2025-07-20 NOTE — ASSESSMENT & PLAN NOTE
BP is elevated.  Suspect that this is volume mediated.  Home Rx: Nifedipine 30 mg daily, Metoprolol succ 25 mg BID?, furosemide 20 mg twice a day.  Restart home BP meds.

## 2025-07-20 NOTE — ASSESSMENT & PLAN NOTE
Toxic metabolic encephalopathy secondary to missed dialysis sessions  For urgent HD today per nephrology  Next session would be Tuesday  Clinically

## 2025-07-20 NOTE — Clinical Note
Case was discussed with  and the patient's admission status was agreed to be Admission Status: observation status to the service of Dr. Jimenez

## 2025-07-20 NOTE — PLAN OF CARE
Emergency hemodialysis treatment planned for 150 minutes using a 2 K+ bath for potassium 5.4 this morning.  Fluid goal 3000 ml/2500 ml net as tolerated.  Treatment review with Dr. Mack via Epic Chat.          Post-Dialysis RN Treatment Note    Blood Pressure:  Pre 126/65 mm/Hg  Post 113/53 mmHg   EDW:  None   Weight:  Pre 98.1 kg   Post 96 kg   Mode of weight measurement: Bed Scale   Volume Removed:  2500 ml/2000 ml net   Treatment duration: 150 minutes    NS given:  No    Treatment shortened No   Medications given during Rx: None Reported   Estimated Kt/V:  None Reported   Access type: Permacath/TDC   Access Issues: No    Report called to primary nurse:   Yes.  Verbal to VITALIY Walekr RN    **Emergent Hemdialysis for missed treatments          Problem: METABOLIC, FLUID AND ELECTROLYTES - ADULT  Goal: Electrolytes maintained within normal limits  Description: INTERVENTIONS:  - Monitor labs and assess patient for signs and symptoms of electrolyte imbalances  - Administer electrolyte replacement as ordered  - Monitor response to electrolyte replacements, including repeat lab results as appropriate  - Instruct patient on fluid and nutrition as appropriate  Outcome: Progressing  Goal: Fluid balance maintained  Description: INTERVENTIONS:  - Monitor labs   - Monitor I/O and WT  - Instruct patient on fluid and nutrition as appropriate  - Assess for signs & symptoms of volume excess or deficit  Outcome: Progressing

## 2025-07-20 NOTE — LETTER
Thank you for allowing us to participate in the care of your patient, Naresh Carpio, who was hospitalized from [unfilled] through 7/24/2025 with the admitting diagnosis of acute encephalopathy secondary to end-stage renal disease with volume overload due to missing dialysis sessions.      Medication Changes:  None     Outpatient testing recommended:  BMP in 3 days     If you have any additional questions or would like to discuss further, please feel free to contact me.    Nirav Bruce MD  [facility] Internal Medicine, Hospitalist  [phone]

## 2025-07-20 NOTE — ED PROVIDER NOTES
Time reflects when diagnosis was documented in both MDM as applicable and the Disposition within this note       Time User Action Codes Description Comment    7/20/2025  5:48 AM Isaiah Calix Add [N18.6,  Z99.2] ESRD on dialysis (HCC)     7/20/2025  7:24 AM Isaiah Calix Add [R41.82] Altered mental status     7/20/2025  7:26 AM Isaiah Calix Add [R09.02] Hypoxia           ED Disposition       ED Disposition   Admit    Condition   Stable    Date/Time   Sun Jul 20, 2025  7:26 AM    Comment   Case was discussed with JANIA and the patient's admission status was agreed to be Admission Status: observation status to the service of Dr. Zelaya .               Assessment & Plan       Medical Decision Making  Patient is a 65 y.o. male who presents to the ED for AMS.     Unclear etiology of AMS. The differential includes toxic metabolic etiologies such as electrolyte disturbances (Na/Ca), hypoglycemia, uremia, infection, toxidromes of intoxication or withdrawal, hypoxemia or hypercarbia, liver disease or failure causing hepatic encephalopathy, endocrine emergencies (hyper/hypothyroidism, adrenal insufficiency), seizure, trauma, intracranial bleeds or ischemic stroke.     Plan: Labs, TSH, coma panel, VBG, CT head, admit            Amount and/or Complexity of Data Reviewed  Labs: ordered.  Radiology: ordered and independent interpretation performed.    Risk  Decision regarding hospitalization.        ED Course as of 07/20/25 0728   Sun Jul 20, 2025   0530 Patient noted to be hypoxic on room air.  Placed on 3 L.  No respiratory distress.   0725 Discussed with SLIM. Accepts admission. Plan for dialysis this AM       Medications   acetaminophen (TYLENOL) tablet 650 mg (has no administration in time range)   ondansetron (ZOFRAN) injection 4 mg (has no administration in time range)   heparin (porcine) subcutaneous injection 5,000 Units (has no administration in time range)       ED Risk Strat Scores                    (ISAR)  Identification of Seniors at Risk  Before the illness or injury that brought you to the Emergency, did you need someone to help you on a regular basis?: 1  In the last 24 hours, have you needed more help than usual?: 1  Have you been hospitalized for one or more nights during the past 6 months?: 1  In general, do you see well?: 0  In general, do you have serious problems with your memory?: 1  Do you take more than three different medications every day?: 1  ISAR Score: 5            SBIRT 20yo+      Flowsheet Row Most Recent Value   NICOLÁS: How many times in the past year have you...    Used an illegal drug or used a prescription medication for non-medical reasons? Never Filed at: 07/20/2025 3607                            History of Present Illness       Chief Complaint   Patient presents with    Altered Mental Status     Ems states pt altered, possibly skipped Sa dialysis.        Past Medical History[1]   Past Surgical History[2]   Family History[3]   Social History[4]   E-Cigarette/Vaping    E-Cigarette Use Never User       E-Cigarette/Vaping Substances    Nicotine No     THC No     CBD No     Flavoring No     Other No     Unknown No       I have reviewed and agree with the history as documented.     65-year-old male, past medical history including ESRD on dialysis, who presents to the emergency department for altered mental status.  Per EMS when patient skips dialysis, which he is known to do, he becomes altered.  EMS thinks he may have skipped Thursday and Saturday session but exact timing is unclear.  Found to be altered by staff.  Was given his nighttime meds without improvement.  Now presents for further evaluation.  On my assessment patient is awake, but confused.  Per EMS he was hallucinating about bugs but he is not doing this for me.  He is able to tell me his name and date of birth but does not know where he is, or the current date.  Denies being in any pain.    Further history and physical limited  secondary to mental status change.      Altered Mental Status      Review of Systems   Unable to perform ROS: Mental status change           Objective       ED Triage Vitals   Temperature Pulse Blood Pressure Respirations SpO2 Patient Position - Orthostatic VS   07/20/25 0435 07/20/25 0344 07/20/25 0344 07/20/25 0344 07/20/25 0344 07/20/25 0344   99 °F (37.2 °C) 81 (!) 185/78 18 93 % Lying      Temp Source Heart Rate Source BP Location FiO2 (%) Pain Score    07/20/25 0435 07/20/25 0344 07/20/25 0344 -- --    Tympanic Monitor Left arm        Vitals      Date and Time Temp Pulse SpO2 Resp BP Pain Score FACES Pain Rating User   07/20/25 0530 -- 70 98 % 18 158/67 -- -- LS   07/20/25 0500 -- 71 98 % 20 155/68 -- --    07/20/25 0445 -- 72 98 % 20 160/70 -- --    07/20/25 0435 99 °F (37.2 °C) -- -- -- -- -- --    07/20/25 0430 -- 74 97 % 18 160/72 -- --    07/20/25 0344 -- 81 93 % 18 185/78 -- -- GRECIA            Physical Exam  Vitals and nursing note reviewed.   Constitutional:       General: He is not in acute distress.     Appearance: He is well-developed. He is not ill-appearing, toxic-appearing or diaphoretic.   HENT:      Head: Normocephalic and atraumatic.      Right Ear: External ear normal.      Left Ear: External ear normal.      Nose: Nose normal.     Eyes:      General: Lids are normal. No scleral icterus.      Cardiovascular:      Rate and Rhythm: Normal rate and regular rhythm.      Heart sounds: Normal heart sounds. No murmur heard.     No friction rub. No gallop.   Pulmonary:      Effort: Pulmonary effort is normal. No respiratory distress.      Breath sounds: Normal breath sounds. No wheezing or rales.   Abdominal:      Palpations: Abdomen is soft.      Tenderness: There is no abdominal tenderness. There is no guarding or rebound.     Musculoskeletal:         General: No deformity. Normal range of motion.      Cervical back: Normal range of motion and neck supple.     Skin:     General: Skin is warm  and dry.     Neurological:      General: No focal deficit present.      Mental Status: He is confused.      GCS: GCS eye subscore is 4. GCS verbal subscore is 5. GCS motor subscore is 5.     Psychiatric:         Mood and Affect: Mood normal.         Behavior: Behavior normal.         Results Reviewed       Procedure Component Value Units Date/Time    HS Troponin I 2hr [[id number]]  (Normal) Collected: 07/20/25 0556    Lab Status: Final result Specimen: Blood from Arm, Right Updated: 07/20/25 0629     hs TnI 2hr 33 ng/L      Delta 2hr hsTnI -2 ng/L     Comprehensive metabolic panel [[id number]]  (Abnormal) Collected: 07/20/25 0354    Lab Status: Final result Specimen: Blood from Arm, Right Updated: 07/20/25 0439     Sodium 133 mmol/L      Potassium 5.4 mmol/L      Chloride 97 mmol/L      CO2 27 mmol/L      ANION GAP 9 mmol/L      BUN 37 mg/dL      Creatinine 5.61 mg/dL      Glucose 104 mg/dL      Calcium 8.6 mg/dL      Corrected Calcium 9.1 mg/dL      AST 13 U/L      ALT 10 U/L      Alkaline Phosphatase 89 U/L      Total Protein 6.6 g/dL      Albumin 3.4 g/dL      Total Bilirubin 0.45 mg/dL      eGFR 9 ml/min/1.73sq m     Narrative:      [employer] guidelines for Chronic Kidney Disease (CKD):     Stage 1 with normal or high GFR (GFR > 90 mL/min/1.73 square meters)    Stage 2 Mild CKD (GFR = 60-89 mL/min/1.73 square meters)    Stage 3A Moderate CKD (GFR = 45-59 mL/min/1.73 square meters)    Stage 3B Moderate CKD (GFR = 30-44 mL/min/1.73 square meters)    Stage 4 Severe CKD (GFR = 15-29 mL/min/1.73 square meters)    Stage 5 End Stage CKD (GFR <15 mL/min/1.73 square meters)  Note: GFR calculation is accurate only with a steady state creatinine    Salicylate level [[id number]]  (Abnormal) Collected: 07/20/25 0544    Lab Status: Final result Specimen: Blood from Arm, Right Updated: 07/20/25 0439     Salicylate Lvl <5 mg/dL     Acetaminophen level-If concentration is detectable, please discuss with  medical  on call. [[id number]]  (Abnormal) Collected: 07/20/25 0354    Lab Status: Final result Specimen: Blood from Arm, Right Updated: 07/20/25 0439     Acetaminophen Level <2 ug/mL     TSH, 3rd generation with Free T4 reflex [[id number]]  (Normal) Collected: 07/20/25 0354    Lab Status: Final result Specimen: Blood from Arm, Right Updated: 07/20/25 0434     TSH 3RD GENERATION 1.948 uIU/mL     HS Troponin 0hr (reflex protocol) [[id number]]  (Normal) Collected: 07/20/25 0354    Lab Status: Final result Specimen: Blood from Arm, Right Updated: 07/20/25 0425     hs TnI 0hr 35 ng/L     HS Troponin I 4hr [[id number]]     Lab Status: No result Specimen: Blood     Ammonia [[id number]]  (Normal) Collected: 07/20/25 0354    Lab Status: Final result Specimen: Blood from Arm, Right Updated: 07/20/25 0420     Ammonia 28 umol/L     Ethanol [[id number]]  (Normal) Collected: 07/20/25 0354    Lab Status: Final result Specimen: Blood from Arm, Right Updated: 07/20/25 0420     Ethanol Lvl <10 mg/dL     CBC and differential [[id number]]  (Abnormal) Collected: 07/20/25 0354    Lab Status: Final result Specimen: Blood from Arm, Right Updated: 07/20/25 0401     WBC 8.32 Thousand/uL      RBC 3.47 Million/uL      Hemoglobin 9.9 g/dL      Hematocrit 31.9 %      MCV 92 fL      MCH 28.5 pg      MCHC 31.0 g/dL      RDW 15.6 %      MPV 9.2 fL      Platelets 219 Thousands/uL      nRBC 0 /100 WBCs      Segmented % 76 %      Immature Grans % 0 %      Lymphocytes % 10 %      Monocytes % 12 %      Eosinophils Relative 2 %      Basophils Relative 0 %      Absolute Neutrophils 6.32 Thousands/µL      Absolute Immature Grans 0.02 Thousand/uL      Absolute Lymphocytes 0.81 Thousands/µL      Absolute Monocytes 0.99 Thousand/µL      Eosinophils Absolute 0.15 Thousand/µL      Basophils Absolute 0.03 Thousands/µL     Blood gas, venous [[id number]]  (Abnormal) Collected: 07/20/25 0354    Lab Status: Final result Specimen: Blood from Arm, Right Updated:  07/20/25 0401     pH, Ru[place]to 7.447     pCO2, [place] 41.3 mm Hg      pO2, [place] 45.4 mm Hg      HCO3, [place] 27.9 mmol/L      Base Excess, Ru[place] 3.5 mmol/L      O2 Content, [place] 12.2 ml/dL      O2 HGB, VENOUS 79.0 %     Fingerstick Glucose (POCT) [[id number]]  (Normal) Collected: 07/20/25 0348    Lab Status: Final result Specimen: Blood Updated: 07/20/25 0350     POC Glucose 108 mg/dl     UA w Reflex to Microscopic w Reflex to Culture [[id number]]     Lab Status: No result Specimen: Urine     Rapid drug screen, urine [[id number]]     Lab Status: No result Specimen: Urine             CT head without contrast   Final Interpretation by Dawood Crouch MD (07/20 0647)      No evidence of acute intracranial process or significant interval change.      Chronic microangiopathy.                  Workstation performed: [id number]         XR chest 1 view portable   ED Interpretation by Isaiah Calix DO (07/20 0644)   Pleural effusion, pulmonary edema          ECG 12 Lead Documentation Only    Date/Time: 7/20/2025 6:42 AM    Performed by: Isaiah Calix DO  Authorized by: Isaiah Calix DO    ECG reviewed by me, the ED Provider: yes    Patient location:  ED  Interpretation:     Interpretation: abnormal    Rate:     ECG rate:  80    ECG rate assessment: normal    Rhythm:     Rhythm: sinus rhythm    Ectopy:     Ectopy: none    QRS:     QRS axis:  Normal  Conduction:     Conduction: normal    ST segments:     ST segments:  Non-specific    Depression:  V5 and V6  T waves:     T waves: normal        ED Medication and Procedure Management   Prior to Admission Medications   Prescriptions Last Dose Informant Patient Reported? Taking?   NIFEdipine (PROCARDIA XL) 30 mg 24 hr tablet  Outside Facility (Specify) No No   Sig: Take 1 tablet (30 mg total) by mouth daily after dinner   acetaminophen (TYLENOL) 325 mg tablet  Outside Facility (Specify) No No   Sig: Take 2 tablets (650 mg total) by mouth every 6 (six) hours as needed for mild  pain   allopurinol (ZYLOPRIM) 100 mg tablet  Outside Facility (Specify) No No   Sig: Take 1 tablet (100 mg total) by mouth daily   aspirin 81 mg chewable tablet   No No   Sig: Chew 1 tablet (81 mg total) daily   atorvastatin (LIPITOR) 80 mg tablet  Outside Facility (Specify) No No   Sig: Take 1 tablet (80 mg total) by mouth daily   famotidine (PEPCID) 20 mg tablet   No No   Sig: Take 1 tablet (20 mg total) by mouth daily at bedtime   furosemide (LASIX) 20 mg tablet   Yes No   Sig: Take 20 mg by mouth 2 (two) times a day   gabapentin (NEURONTIN) 100 mg capsule  Outside Facility (Specify) No No   Sig: Take 1 capsule (100 mg total) by mouth 3 (three) times a week   ipratropium-albuterol (DUO-NEB) 0.5-2.5 mg/3 mL nebulizer solution   Yes No   Sig: Take 3 mL by nebulization 6 (six) times a day   lidocaine (LIDODERM) 5 %  Outside Facility (Specify) No No   Sig: Apply 1 patch topically over 12 hours daily Remove & Discard patch within 12 hours or as directed by MD   melatonin 3 mg  Outside Facility (Specify) No No   Sig: Take 2 tablets (6 mg total) by mouth daily at bedtime   methocarbamol (ROBAXIN) 500 mg tablet  Outside Facility (Specify) No No   Sig: Take 1 tablet (500 mg total) by mouth 3 (three) times a day as needed for muscle spasms   metoprolol succinate (TOPROL-XL) 25 mg 24 hr tablet   No No   Sig: Take 1 tablet (25 mg total) by mouth every 12 (twelve) hours   oxyCODONE (ROXICODONE) 5 immediate release tablet   No No   Sig: Take 0.5 tablets (2.5 mg total) by mouth every 8 (eight) hours as needed for moderate pain or severe pain Max Daily Amount: 7.5 mg   senna (SENOKOT) 8.6 mg  Outside Facility (Specify) No No   Sig: Take 1 tablet (8.6 mg total) by mouth daily with lunch   sevelamer (RENAGEL) 800 mg tablet  Outside Facility (Specify) No No   Sig: Take 2 tablets (1,600 mg total) by mouth 3 (three) times a day with meals      Facility-Administered Medications: None     Patient's Medications   Discharge Prescriptions     No medications on file     No discharge procedures on file.  ED SEPSIS DOCUMENTATION   Time reflects when diagnosis was documented in both MDM as applicable and the Disposition within this note       Time User Action Codes Description Comment    7/20/2025  5:48 AM Isaiah Calix Add [N18.6,  Z99.2] ESRD on dialysis (HCC)     7/20/2025  7:24 AM Isaiah Calix [R41.82] Altered mental status     7/20/2025  7:26 AM Isaiah Calix [R09.02] Hypoxia                    [1]   Past Medical History:  Diagnosis Date    Acute cystitis 10/18/2024    Acute on chronic anemia 01/23/2022    Atrial fibrillation (HCC)     Bilateral sacral fracture, closed (HCC) 07/03/2024    Bradycardia 09/05/2023    Diabetes mellitus (HCC)     Diabetes mellitus type 2 with peripheral artery disease (HCC)     ESRD (end stage renal disease) on dialysis (HCC)     Hematuria 10/10/2024    Hematuria 10/10/2024    Hyperkalemia 04/06/2024    Hyperkalemia 04/06/2024    Hyperlipidemia     Hypertension     Left toe amputee (HCC)     Subacute osteomyelitis of right foot (HCC)     TIA (transient ischemic attack)     Toxic metabolic encephalopathy 10/08/2024    Traumatic rhabdomyolysis (HCC) 08/19/2023   [2]   Past Surgical History:  Procedure Laterality Date    AMPUTATION Left     left foot resection 10 years ago dr tran    HEMODIALYSIS ADULT  1/18/2025    HEMODIALYSIS ADULT  2/27/2025    HEMODIALYSIS ADULT  6/4/2025    IR EMBOLIZATION (SPECIFY VESSEL OR SITE)  4/3/2025    IR EMBOLIZATION (SPECIFY VESSEL OR SITE)  4/7/2025    IR LOWER EXTREMITY ANGIOGRAM  08/29/2023    IR TEMPORARY DIALYSIS CATHETER PLACEMENT  08/25/2023    IR TUNNELED CENTRAL LINE REMOVAL  08/23/2023    IR TUNNELED DIALYSIS CATHETER PLACEMENT  01/27/2022    IR TUNNELED DIALYSIS CATHETER PLACEMENT  08/30/2023    [place] AMPUTATION METATARSAL W/TOE SINGLE Right 8/31/2023    Procedure: RIGHT PARTIAL 1ST RAY RESECTION WITH  WOUND VAC APPLICATION;  Surgeon: Won Parmar DPM;  Location:  [facility];  Service: Podiatry   [3] No family history on file.  [4]   Social History  Tobacco Use    Smoking status: Never     Passive exposure: Never    Smokeless tobacco: Never   Vaping Use    Vaping status: Never Used   Substance Use Topics    Alcohol use: Not Currently     Alcohol/week: 0.0 standard drinks of alcohol     Comment: 0    Drug use: Never        Isaiah Calix DO  07/20/25 0728

## 2025-07-20 NOTE — PLAN OF CARE
Problem: METABOLIC, FLUID AND ELECTROLYTES - ADULT  Goal: Electrolytes maintained within normal limits  Description: INTERVENTIONS:  - Monitor labs and assess patient for signs and symptoms of electrolyte imbalances  - Administer electrolyte replacement as ordered  - Monitor response to electrolyte replacements, including repeat lab results as appropriate  - Instruct patient on fluid and nutrition as appropriate  Outcome: Progressing     Problem: HEMATOLOGIC - ADULT  Goal: Maintains hematologic stability  Description: INTERVENTIONS  - Assess for signs and symptoms of bleeding or hemorrhage  - Monitor labs  - Administer supportive blood products/factors as ordered and appropriate  Outcome: Progressing     Problem: PAIN - ADULT  Goal: Verbalizes/displays adequate comfort level or baseline comfort level  Description: Interventions:  - Encourage patient to monitor pain and request assistance  - Assess pain using appropriate pain scale  - Administer analgesics as ordered based on type and severity of pain and evaluate response  - Implement non-pharmacological measures as appropriate and evaluate response  - Consider cultural and social influences on pain and pain management  - Notify physician/advanced practitioner if interventions unsuccessful or patient reports new pain  - Educate patient/family on pain management process including their role and importance of  reporting pain   - Provide non-pharmacologic/complimentary pain relief interventions  Outcome: Progressing     Problem: INFECTION - ADULT  Goal: Absence or prevention of progression during hospitalization  Description: INTERVENTIONS:  - Assess and monitor for signs and symptoms of infection  - Monitor lab/diagnostic results  - Monitor all insertion sites, i.e. indwelling lines, tubes, and drains  - Monitor endotracheal if appropriate and nasal secretions for changes in amount and color  - [place] appropriate cooling/warming therapies per order  - Administer  medications as ordered  - Instruct and encourage patient and family to use good hand hygiene technique  - Identify and instruct in appropriate isolation precautions for identified infection/condition  Outcome: Progressing

## 2025-07-20 NOTE — CONSULTS
NEPHROLOGY HOSPITAL CONSULTATION   Naresh Carpio 65 y.o. male MRN: [id number]  Unit/Bed#: ED 01 Encounter: [id number]    Assessment & Plan  ESRD (end stage renal disease) (Formerly [facility])  [facility] TTS  Access: R RADHA ines.   EDW 89.5 kg.   He is mildly hyperkalemic.  In light of his missed HD treatments and current fluid overload, we will arrange for an urgent HD treatment today.  Will plan for 2.5-hour dialysis and 3 kg UF.     Encephalopathy  Defer to primary service.     Acute respiratory failure with hypoxia ([facility])  Chest x-ray with vascular congestion.  Will plan for HD today with 3 kg UF.    Essential hypertension  BP is elevated.  Suspect that this is volume mediated.  Home Rx: Nifedipine 30 mg daily, Metoprolol succ 25 mg BID?, furosemide 20 mg twice a day.  Restart home BP meds.     Anemia in ESRD (end-stage renal disease)  (Formerly [facility])  Hemoglobin is slightly below goal.  He is on Mircera in the outpatient setting.    Chronic kidney disease-mineral and bone disorder  Home Rx: Sevelamer 1600 mg TID.   Check phos.       TODAY's DISCUSSION/PLAN:  Plan for 2.5 hour HD.   Will aim for 3 kg UF on HD.   Restart home BP meds.     I have reviewed the nephrology recommendations including planning for HD today, with ER physician, and we are in agreement with renal plan including the information outlined above.  Outpatient HD records were personally reviewed by me.   The images (CXR) were personally reviewed by me in PACS    HISTORY OF PRESENT ILLNESS:  Requesting Physician: Gennaro Zelaya, *  Reason for Consult: ESRD on HD    Naresh Carpio is a 65 y.o. male who was admitted to [facility] on 7/20/25 after presenting with change in mental status. A renal consultation is requested today for assistance in the management of ESRD on HD    Ed has a history of HTN, DM, HLP, ESRD on HD, CAD, TIA, atrial fib, RP hematoma.  He now presents to [facility] on 7/20/2025 after being sent from the SNF due to a change in mental  status.  The patient is confused and is unable to give any type of meaningful history.  Based on my discussion with the ER physician, the patient was apparently sent in due to confusion.  From what I gather, the patient reportedly missed 2 dialysis treatments due to refusing to go to dialysis.  On arrival in the ER, the patient was hypoxic and was placed on oxygen via nasal cannula.  We are now being consulted for assistance with management of ESRD on HD. During my evaluation, the patient was more confused than the baseline that I recall of him. He was not answering questions appropriately.     PAST MEDICAL HISTORY:  Past Medical History[1]    PAST SURGICAL HISTORY:  Past Surgical History[2]    ALLERGIES:  Allergies[3]    SOCIAL HISTORY:  Social History     Substance and Sexual Activity   Alcohol Use Not Currently    Alcohol/week: 0.0 standard drinks of alcohol    Comment: 0     Social History     Substance and Sexual Activity   Drug Use Never     Tobacco Use History[4]    FAMILY HISTORY:  Family History[5]    MEDICATIONS:  Current Medications[6]    REVIEW OF SYSTEMS:  Could not obtain due to confusion.  Please refer to HPI  All the systems were reviewed and were negative except as documented on the HPI.    PHYSICAL EXAM:  Current Weight:    First Weight:    Vitals:    07/20/25 0435 07/20/25 0445 07/20/25 0500 07/20/25 0530   BP:  160/70 155/68 158/67   BP Location:  Right arm Right arm Right arm   Pulse:  72 71 70   Resp:  20 20 18   Temp: 99 °F (37.2 °C)      TempSrc: Tympanic      SpO2:  98% 98% 98%     No intake or output data in the 24 hours ending 07/20/25 0737  Physical Exam  General: conscious, confused,   Skin: warm, dry, good turgor.   Eyes: pink conjunctivae, no scleral icterus.   ENT: moist lips and mucous membranes.   Respiratory: equal chest expansion, poor inspiratory effort.   Cardiovascular: distinct heart sounds, normal rate, regular rhythm, no rub  Abdomen: soft, non-tender, non-distended,  "normoactive bowel sounds  Extremities: mild bilateral LE edema.  Genitourinary: no gaitan catheter.   Neuro: awake, alert, confused.   Psych: could not eval.     Invasive Devices: R IJ permcath.      Lab Results:   Results from last 7 days   Lab Units 07/20/25  0354   WBC Thousand/uL 8.32   HEMOGLOBIN g/dL 9.9*   HEMATOCRIT % 31.9*   PLATELETS Thousands/uL 219   POTASSIUM mmol/L 5.4*   CHLORIDE mmol/L 97   CO2 mmol/L 27   BUN mg/dL 37*   CREATININE mg/dL 5.61*   CALCIUM mg/dL 8.6   ALK PHOS U/L 89   ALT U/L 10   AST U/L 13     Other Studies:   Chest x-ray personally reviewed by me showed vascular congestion.    Portions of the record may have been created with voice recognition software. Occasional wrong word or \"sound a like\" substitutions may have occurred due to the inherent limitations of voice recognition software. Read the chart carefully and recognize, using context, where substitutions have occurred.If you have any questions, please contact the dictating provider.       [1]   Past Medical History:  Diagnosis Date    Acute cystitis 10/18/2024    Acute on chronic anemia 01/23/2022    Atrial fibrillation (HCC)     Bilateral sacral fracture, closed (HCC) 07/03/2024    Bradycardia 09/05/2023    Diabetes mellitus (HCC)     Diabetes mellitus type 2 with peripheral artery disease (HCC)     ESRD (end stage renal disease) on dialysis (HCC)     Hematuria 10/10/2024    Hematuria 10/10/2024    Hyperkalemia 04/06/2024    Hyperkalemia 04/06/2024    Hyperlipidemia     Hypertension     Left toe amputee (HCC)     Subacute osteomyelitis of right foot (HCC)     TIA (transient ischemic attack)     Toxic metabolic encephalopathy 10/08/2024    Traumatic rhabdomyolysis ([facility]) 08/19/2023   [2]   Past Surgical History:  Procedure Laterality Date    AMPUTATION Left     left foot resection 10 years ago dr tran    HEMODIALYSIS ADULT  1/18/2025    HEMODIALYSIS ADULT  2/27/2025    HEMODIALYSIS ADULT  6/4/2025    IR EMBOLIZATION (SPECIFY " VESSEL OR SITE)  4/3/2025    IR EMBOLIZATION (SPECIFY VESSEL OR SITE)  4/7/2025    IR LOWER EXTREMITY ANGIOGRAM  08/29/2023    IR TEMPORARY DIALYSIS CATHETER PLACEMENT  08/25/2023    IR TUNNELED CENTRAL LINE REMOVAL  08/23/2023    IR TUNNELED DIALYSIS CATHETER PLACEMENT  01/27/2022    IR TUNNELED DIALYSIS CATHETER PLACEMENT  08/30/2023    HI AMPUTATION METATARSAL W/TOE SINGLE Right 8/31/2023    Procedure: RIGHT PARTIAL 1ST RAY RESECTION WITH  WOUND VAC APPLICATION;  Surgeon: Won Parmar DPM;  Location: [facility];  Service: Podiatry   [3] No Known Allergies  [4]   Social History  Tobacco Use   Smoking Status Never    Passive exposure: Never   Smokeless Tobacco Never   [5] No family history on file.  [6]   Current Facility-Administered Medications:     acetaminophen (TYLENOL) tablet 650 mg, 650 mg, Oral, Q6H PRN, Gennaro Zelaya, DO    heparin (porcine) subcutaneous injection 5,000 Units, 5,000 Units, Subcutaneous, Q8H KEMAL, Gennaro Zelaya, DO    ondansetron (ZOFRAN) injection 4 mg, 4 mg, Intravenous, Q6H PRN, Gennaro Zelaya, DO    Current Outpatient Medications:     acetaminophen (TYLENOL) 325 mg tablet, Take 2 tablets (650 mg total) by mouth every 6 (six) hours as needed for mild pain, Disp: , Rfl:     allopurinol (ZYLOPRIM) 100 mg tablet, Take 1 tablet (100 mg total) by mouth daily, Disp: , Rfl:     aspirin 81 mg chewable tablet, Chew 1 tablet (81 mg total) daily, Disp: 30 tablet, Rfl: 3    atorvastatin (LIPITOR) 80 mg tablet, Take 1 tablet (80 mg total) by mouth daily, Disp: , Rfl:     famotidine (PEPCID) 20 mg tablet, Take 1 tablet (20 mg total) by mouth daily at bedtime, Disp: , Rfl:     furosemide (LASIX) 20 mg tablet, Take 20 mg by mouth 2 (two) times a day, Disp: , Rfl:     gabapentin (NEURONTIN) 100 mg capsule, Take 1 capsule (100 mg total) by mouth 3 (three) times a week, Disp: , Rfl:     ipratropium-albuterol (DUO-NEB) 0.5-2.5 mg/3 mL nebulizer solution, Take 3 mL by nebulization  6 (six) times a day, Disp: , Rfl:     lidocaine (LIDODERM) 5 %, Apply 1 patch topically over 12 hours daily Remove & Discard patch within 12 hours or as directed by MD, Disp: , Rfl:     melatonin 3 mg, Take 2 tablets (6 mg total) by mouth daily at bedtime, Disp: 30 tablet, Rfl: 0    methocarbamol (ROBAXIN) 500 mg tablet, Take 1 tablet (500 mg total) by mouth 3 (three) times a day as needed for muscle spasms, Disp: 90 tablet, Rfl: 0    metoprolol succinate (TOPROL-XL) 25 mg 24 hr tablet, Take 1 tablet (25 mg total) by mouth every 12 (twelve) hours, Disp: , Rfl:     NIFEdipine (PROCARDIA XL) 30 mg 24 hr tablet, Take 1 tablet (30 mg total) by mouth daily after dinner, Disp: 30 tablet, Rfl: 0    oxyCODONE (ROXICODONE) 5 immediate release tablet, Take 0.5 tablets (2.5 mg total) by mouth every 8 (eight) hours as needed for moderate pain or severe pain Max Daily Amount: 7.5 mg, Disp: 5 tablet, Rfl: 0    senna (SENOKOT) 8.6 mg, Take 1 tablet (8.6 mg total) by mouth daily with lunch, Disp: 30 tablet, Rfl: 0    sevelamer (RENAGEL) 800 mg tablet, Take 2 tablets (1,600 mg total) by mouth 3 (three) times a day with meals, Disp: 180 tablet, Rfl: 0

## 2025-07-20 NOTE — ASSESSMENT & PLAN NOTE
[facility] TTS  Access: R IJ permcath.   EDW 89.5 kg.   He is mildly hyperkalemic.  In light of his missed HD treatments and current fluid overload, we will arrange for an urgent HD treatment today.  Will plan for 2.5-hour dialysis and 3 kg UF.

## 2025-07-20 NOTE — ASSESSMENT & PLAN NOTE
Currently requiring 3 L  Suspect this is secondary to volume overload in the setting of missed dialysis sessions  Should be able to titrate oxygen down after volume removal with HD

## 2025-07-20 NOTE — H&P
H&P - Hospitalist   Name: Naresh Carpio 65 y.o. male I MRN: [id number]  Unit/Bed#: 2 [place] [address] I Date of Admission: 7/20/2025   Date of Service: 7/20/2025 I Hospital Day: 0     Assessment & Plan  Encephalopathy  Toxic metabolic encephalopathy secondary to missed dialysis sessions  For urgent HD today per nephrology  Next session would be Tuesday  Clinically  ESRD (end stage renal disease) ([facility])  Lab Results   Component Value Date    EGFR 9 07/20/2025    EGFR 9 07/12/2025    EGFR 13 07/11/2025    CREATININE 5.61 (H) 07/20/2025    CREATININE 5.70 (H) 07/12/2025    CREATININE 4.40 (H) 07/11/2025   As above, urgent HD today  Appreciate nephrology recommendations   continue home binders    Acute respiratory failure with hypoxia ([facility])  Currently requiring 3 L  Suspect this is secondary to volume overload in the setting of missed dialysis sessions  Should be able to titrate oxygen down after volume removal with HD  Essential hypertension  Continue home metoprolol, nifedipine, Lasix  Monitor  PAD (peripheral artery disease) ([facility])  No acute concerns from the standpoint  Chronic venous stasis changes on the legs bilaterally  Continue home aspirin and statin  Paroxysmal atrial fibrillation ([facility])  Continue metoprolol and nifedipine for rate control  Not on any anticoagulants at home  Heparin subcu for DVT prophylaxis  Anemia in ESRD (end-stage renal disease)  ([facility])  Chronic anemia at baseline  Monitor  Chronic kidney disease-mineral and bone disorder  Continue home binders      VTE Pharmacologic Prophylaxis: VTE Score: 3 Moderate Risk (Score 3-4) - Pharmacological DVT Prophylaxis Ordered: heparin.  Code Status: Level 1 - Full Code   Discussion with family: Updated  (sister) via phone.    Anticipated Length of Stay: Patient will be admitted on an observation basis with an anticipated length of stay of less than 2 midnights secondary to urgent hemodialysis, hypoxia, clinical monitoring, serial labs, dispo  planning.    History of Present Illness   Chief Complaint: altered mental status    Naresh Carpio is a 65 y.o. male with a PMH of ESRD on HD, chronic anemia, PAD, PAF, hypertension, who presents with altered mental status.  The patient resides at a skilled nursing facility and is chronically on hemodialysis.  Apparently he is missed the last 1 or 2 sessions and become progressively more confused.  Per my discussion with the ER provider the this is apparently normal for him to become confused when missing dialysis.  In the emergency room workup with CT of the head, blood work was unremarkable.  He has no leukocytosis or fever to suggest infectious etiology.  Case was discussed with nephrology who will be arranging urgent HD.  Otherwise patient was confused during my evaluation and not a great historian.  He voices no specific complaints however.    Review of systems positive for altered mental status, otherwise difficult to obtain given his mental status.    Historical Information   Past Medical History[1]  Past Surgical History[2]  Social History[3]  E-Cigarette/Vaping    E-Cigarette Use Never User      E-Cigarette/Vaping Substances    Nicotine No     THC No     CBD No     Flavoring No     Other No     Unknown No      Family History[4]  Social History:  Marital Status:    Occupation: does not work  Patient Pre-hospital Living Situation: Skilled nursing facility  Patient Pre-hospital Level of Mobility: unable to be assessed at time of evaluation  Patient Pre-hospital Diet Restrictions: unkown    Meds/Allergies   I have reviewed home medications using recent Epic encounter.  Prior to Admission medications    Medication Sig Start Date End Date Taking? Authorizing Provider   acetaminophen (TYLENOL) 325 mg tablet Take 2 tablets (650 mg total) by mouth every 6 (six) hours as needed for mild pain 5/6/25  Yes GRANT Dawson   allopurinol (ZYLOPRIM) 100 mg tablet Take 1 tablet (100 mg total) by mouth  daily 5/6/25 1/7/26 Yes GRANT Dawson   aspirin 81 mg chewable tablet Chew 1 tablet (81 mg total) daily 5/16/25  Yes Joey Gurrola DO   famotidine (PEPCID) 20 mg tablet Take 1 tablet (20 mg total) by mouth daily at bedtime 6/11/25  Yes Allegra Carrero MD   gabapentin (NEURONTIN) 100 mg capsule Take 1 capsule (100 mg total) by mouth 3 (three) times a week 4/1/25  Yes Nallely Pisano PA-C   ipratropium-albuterol (DUO-NEB) 0.5-2.5 mg/3 mL nebulizer solution Take 3 mL by nebulization 6 (six) times a day   Yes Historical Provider, MD   melatonin 3 mg Take 2 tablets (6 mg total) by mouth daily at bedtime 5/7/25  Yes Santos Guan DO   methocarbamol (ROBAXIN) 500 mg tablet Take 1 tablet (500 mg total) by mouth 3 (three) times a day as needed for muscle spasms 5/7/25  Yes Santos Guan DO   metoprolol succinate (TOPROL-XL) 25 mg 24 hr tablet Take 1 tablet (25 mg total) by mouth every 12 (twelve) hours 6/11/25  Yes Allegra Carrero MD   NIFEdipine (PROCARDIA XL) 30 mg 24 hr tablet Take 1 tablet (30 mg total) by mouth daily after dinner 5/6/25  Yes GRANT Dawson   oxyCODONE (ROXICODONE) 5 immediate release tablet Take 0.5 tablets (2.5 mg total) by mouth every 8 (eight) hours as needed for moderate pain or severe pain Max Daily Amount: 7.5 mg 6/11/25  Yes Allegra Carrero MD   atorvastatin (LIPITOR) 80 mg tablet Take 1 tablet (80 mg total) by mouth daily 5/6/25   GRANT Dawson   furosemide (LASIX) 20 mg tablet Take 20 mg by mouth 2 (two) times a day    Historical Provider, MD   lidocaine (LIDODERM) 5 % Apply 1 patch topically over 12 hours daily Remove & Discard patch within 12 hours or as directed by MD 5/13/25   Nirav Bruce MD   senna (SENOKOT) 8.6 mg Take 1 tablet (8.6 mg total) by mouth daily with lunch 5/7/25   Santos Guan DO   sevelamer (RENAGEL) 800 mg tablet Take 2 tablets (1,600 mg total) by mouth 3 (three) times a day with meals 5/6/25   GRANT Dawson     No  Known Allergies    Objective :  Temp:  [97.1 °F (36.2 °C)-99 °F (37.2 °C)] 98.4 °F (36.9 °C)  HR:  [69-81] 73  BP: (121-185)/(51-78) 121/51  Resp:  [18-20] 20  SpO2:  [92 %-98 %] 92 %  O2 Device: Nasal cannula  Nasal Cannula O2 Flow Rate (L/min):  [3 L/min] 3 L/min    PHYSICAL EXAM:    Vitals signs reviewed  Constitutional   Awake and cooperative.  Ill-appearing.   Head/Neck   Normocephalic. Atraumatic.   HEENT   No scleral icterus. EOMI.   Heart   Regular rate and rhythm. No murmurs.   Lungs   Clear to auscultation bilaterally. Respirations unlaboured.   Abdomen   Soft. Nontender. Nondistended.    Skin   Skin color normal. No rashes.   Extremities   No deformities. No peripheral edema.   Neuro Drowsy but arousable.  Oriented to self only.   Psych Encephalopathic.               Lab Results: I have reviewed the following results:  Results from last 7 days   Lab Units 07/20/25  0354   WBC Thousand/uL 8.32   HEMOGLOBIN g/dL 9.9*   HEMATOCRIT % 31.9*   PLATELETS Thousands/uL 219   SEGS PCT % 76*   LYMPHO PCT % 10*   MONO PCT % 12   EOS PCT % 2     Results from last 7 days   Lab Units 07/20/25  0354   SODIUM mmol/L 133*   POTASSIUM mmol/L 5.4*   CHLORIDE mmol/L 97   CO2 mmol/L 27   BUN mg/dL 37*   CREATININE mg/dL 5.61*   ANION GAP mmol/L 9   CALCIUM mg/dL 8.6   ALBUMIN g/dL 3.4*   TOTAL BILIRUBIN mg/dL 0.45   ALK PHOS U/L 89   ALT U/L 10   AST U/L 13   GLUCOSE RANDOM mg/dL 104         Results from last 7 days   Lab Units 07/20/25  0348   POC GLUCOSE mg/dl 108     Lab Results   Component Value Date    HGBA1C 4.8 06/04/2025    HGBA1C 5.7 (H) 02/09/2025    HGBA1C 5.5 10/08/2024           Imaging Results Review: I reviewed radiology reports from this admission including: CT head.  Other Study Results Review: No additional pertinent studies reviewed.    Administrative Statements   I have spent a total time of 55 minutes in caring for this patient on the day of the visit/encounter including Diagnostic results, Prognosis,  Risks and benefits of tx options, Instructions for management, Impressions, Counseling / Coordination of care, Documenting in the medical record, Reviewing/placing orders in the medical record (including tests, medications, and/or procedures), Obtaining or reviewing history  , and Communicating with other healthcare professionals .    ** Please Note: This note has been constructed using a voice recognition system. **         [1]   Past Medical History:  Diagnosis Date    Acute cystitis 10/18/2024    Acute on chronic anemia 01/23/2022    Atrial fibrillation (HCC)     Bilateral sacral fracture, closed (HCC) 07/03/2024    Bradycardia 09/05/2023    Diabetes mellitus (HCC)     Diabetes mellitus type 2 with peripheral artery disease (HCC)     ESRD (end stage renal disease) on dialysis ([facility])     Hematuria 10/10/2024    Hematuria 10/10/2024    Hyperkalemia 04/06/2024    Hyperkalemia 04/06/2024    Hyperlipidemia     Hypertension     Left toe amputee (HCC)     Subacute osteomyelitis of right foot ([facility])     TIA (transient ischemic attack)     Toxic metabolic encephalopathy 10/08/2024    Traumatic rhabdomyolysis ([facility]) 08/19/2023   [2]   Past Surgical History:  Procedure Laterality Date    AMPUTATION Left     left foot resection 10 years ago dr tran    HEMODIALYSIS ADULT  1/18/2025    HEMODIALYSIS ADULT  2/27/2025    HEMODIALYSIS ADULT  6/4/2025    IR EMBOLIZATION (SPECIFY VESSEL OR SITE)  4/3/2025    IR EMBOLIZATION (SPECIFY VESSEL OR SITE)  4/7/2025    IR LOWER EXTREMITY ANGIOGRAM  08/29/2023    IR TEMPORARY DIALYSIS CATHETER PLACEMENT  08/25/2023    IR TUNNELED CENTRAL LINE REMOVAL  08/23/2023    IR TUNNELED DIALYSIS CATHETER PLACEMENT  01/27/2022    IR TUNNELED DIALYSIS CATHETER PLACEMENT  08/30/2023    FL AMPUTATION METATARSAL W/TOE SINGLE Right 8/31/2023    Procedure: RIGHT PARTIAL 1ST RAY RESECTION WITH  WOUND VAC APPLICATION;  Surgeon: Won Parmar DPM;  Location: [facility];  Service: Podiatry   [3]   Social  History  Tobacco Use    Smoking status: Never     Passive exposure: Never    Smokeless tobacco: Never   Vaping Use    Vaping status: Never Used   Substance and Sexual Activity    Alcohol use: Not Currently     Alcohol/week: 0.0 standard drinks of alcohol     Comment: 0    Drug use: Never   [4] No family history on file.

## 2025-07-20 NOTE — ASSESSMENT & PLAN NOTE
No acute concerns from the standpoint  Chronic venous stasis changes on the legs bilaterally  Continue home aspirin and statin

## 2025-07-20 NOTE — ASSESSMENT & PLAN NOTE
Continue metoprolol and nifedipine for rate control  Not on any anticoagulants at home  Heparin subcu for DVT prophylaxis

## 2025-07-21 PROBLEM — G62.9 NEUROPATHY: Status: ACTIVE | Noted: 2025-07-21

## 2025-07-21 LAB
AMPHETAMINES SERPL QL SCN: NEGATIVE
ANION GAP SERPL CALCULATED.3IONS-SCNC: 9 MMOL/L (ref 4–13)
BACTERIA UR QL AUTO: ABNORMAL /HPF
BARBITURATES UR QL: NEGATIVE
BENZODIAZ UR QL: NEGATIVE
BILIRUB UR QL STRIP: NEGATIVE
BUN SERPL-MCNC: 23 MG/DL (ref 5–25)
CALCIUM SERPL-MCNC: 8.7 MG/DL (ref 8.4–10.2)
CHLORIDE SERPL-SCNC: 97 MMOL/L (ref 96–108)
CLARITY UR: CLEAR
CO2 SERPL-SCNC: 29 MMOL/L (ref 21–32)
COCAINE UR QL: NEGATIVE
COLOR UR: COLORLESS
CREAT SERPL-MCNC: 4.44 MG/DL (ref 0.6–1.3)
ERYTHROCYTE [DISTWIDTH] IN BLOOD BY AUTOMATED COUNT: 15.3 % (ref 11.6–15.1)
FENTANYL UR QL SCN: NEGATIVE
GFR SERPL CREATININE-BSD FRML MDRD: 12 ML/MIN/1.73SQ M
GLUCOSE P FAST SERPL-MCNC: 78 MG/DL (ref 65–99)
GLUCOSE SERPL-MCNC: 78 MG/DL (ref 65–140)
GLUCOSE UR STRIP-MCNC: ABNORMAL MG/DL
HCT VFR BLD AUTO: 33.4 % (ref 36.5–49.3)
HGB BLD-MCNC: 10.1 G/DL (ref 12–17)
HGB UR QL STRIP.AUTO: NEGATIVE
HYDROCODONE UR QL SCN: NEGATIVE
KETONES UR STRIP-MCNC: NEGATIVE MG/DL
LEUKOCYTE ESTERASE UR QL STRIP: ABNORMAL
MAGNESIUM SERPL-MCNC: 2 MG/DL (ref 1.9–2.7)
MCH RBC QN AUTO: 27.7 PG (ref 26.8–34.3)
MCHC RBC AUTO-ENTMCNC: 30.2 G/DL (ref 31.4–37.4)
MCV RBC AUTO: 92 FL (ref 82–98)
METHADONE UR QL: NEGATIVE
MRSA NOSE QL CULT: NORMAL
NITRITE UR QL STRIP: NEGATIVE
NON-SQ EPI CELLS URNS QL MICRO: ABNORMAL /HPF
OPIATES UR QL SCN: NEGATIVE
OXYCODONE+OXYMORPHONE UR QL SCN: POSITIVE
PCP UR QL: NEGATIVE
PH UR STRIP.AUTO: 8 [PH]
PLATELET # BLD AUTO: 205 THOUSANDS/UL (ref 149–390)
PMV BLD AUTO: 9.9 FL (ref 8.9–12.7)
POTASSIUM SERPL-SCNC: 4.3 MMOL/L (ref 3.5–5.3)
PROT UR STRIP-MCNC: ABNORMAL MG/DL
RBC # BLD AUTO: 3.64 MILLION/UL (ref 3.88–5.62)
RBC #/AREA URNS AUTO: ABNORMAL /HPF
SODIUM SERPL-SCNC: 135 MMOL/L (ref 135–147)
SP GR UR STRIP.AUTO: <1.005 (ref 1–1.03)
THC UR QL: NEGATIVE
UROBILINOGEN UR STRIP-ACNC: <2 MG/DL
WBC # BLD AUTO: 5.56 THOUSAND/UL (ref 4.31–10.16)
WBC #/AREA URNS AUTO: ABNORMAL /HPF
WBC CLUMPS # UR AUTO: PRESENT /UL

## 2025-07-21 PROCEDURE — 80307 DRUG TEST PRSMV CHEM ANLYZR: CPT | Performed by: STUDENT IN AN ORGANIZED HEALTH CARE EDUCATION/TRAINING PROGRAM

## 2025-07-21 PROCEDURE — 99232 SBSQ HOSP IP/OBS MODERATE 35: CPT | Performed by: INTERNAL MEDICINE

## 2025-07-21 PROCEDURE — 94640 AIRWAY INHALATION TREATMENT: CPT

## 2025-07-21 PROCEDURE — 94760 N-INVAS EAR/PLS OXIMETRY 1: CPT

## 2025-07-21 PROCEDURE — 85027 COMPLETE CBC AUTOMATED: CPT | Performed by: INTERNAL MEDICINE

## 2025-07-21 PROCEDURE — 83735 ASSAY OF MAGNESIUM: CPT | Performed by: INTERNAL MEDICINE

## 2025-07-21 PROCEDURE — 99214 OFFICE O/P EST MOD 30 MIN: CPT | Performed by: INTERNAL MEDICINE

## 2025-07-21 PROCEDURE — 80048 BASIC METABOLIC PNL TOTAL CA: CPT | Performed by: INTERNAL MEDICINE

## 2025-07-21 PROCEDURE — 81001 URINALYSIS AUTO W/SCOPE: CPT | Performed by: STUDENT IN AN ORGANIZED HEALTH CARE EDUCATION/TRAINING PROGRAM

## 2025-07-21 RX ORDER — GABAPENTIN 100 MG/1
100 CAPSULE ORAL DAILY
Status: DISCONTINUED | OUTPATIENT
Start: 2025-07-22 | End: 2025-07-24 | Stop reason: HOSPADM

## 2025-07-21 RX ORDER — LIDOCAINE 50 MG/G
1 PATCH TOPICAL DAILY
Status: DISCONTINUED | OUTPATIENT
Start: 2025-07-21 | End: 2025-07-24 | Stop reason: HOSPADM

## 2025-07-21 RX ORDER — HYDROMORPHONE HCL IN WATER/PF 6 MG/30 ML
0.2 PATIENT CONTROLLED ANALGESIA SYRINGE INTRAVENOUS EVERY 4 HOURS PRN
Status: DISCONTINUED | OUTPATIENT
Start: 2025-07-21 | End: 2025-07-24 | Stop reason: HOSPADM

## 2025-07-21 RX ADMIN — HEPARIN SODIUM 5000 UNITS: 5000 INJECTION INTRAVENOUS; SUBCUTANEOUS at 21:58

## 2025-07-21 RX ADMIN — ONDANSETRON 4 MG: 2 INJECTION INTRAMUSCULAR; INTRAVENOUS at 12:02

## 2025-07-21 RX ADMIN — NIFEDIPINE 30 MG: 30 TABLET, EXTENDED RELEASE ORAL at 17:12

## 2025-07-21 RX ADMIN — Medication 2.5 MG: at 19:42

## 2025-07-21 RX ADMIN — HEPARIN SODIUM 5000 UNITS: 5000 INJECTION INTRAVENOUS; SUBCUTANEOUS at 13:44

## 2025-07-21 RX ADMIN — Medication 2.5 MG: at 10:19

## 2025-07-21 RX ADMIN — SEVELAMER HYDROCHLORIDE 1600 MG: 800 TABLET ORAL at 16:30

## 2025-07-21 RX ADMIN — LIDOCAINE 1 PATCH: 50 PATCH CUTANEOUS at 02:13

## 2025-07-21 RX ADMIN — FUROSEMIDE 20 MG: 20 TABLET ORAL at 08:22

## 2025-07-21 RX ADMIN — FAMOTIDINE 20 MG: 20 TABLET, FILM COATED ORAL at 21:58

## 2025-07-21 RX ADMIN — HYDROMORPHONE HYDROCHLORIDE 0.2 MG: 0.2 INJECTION, SOLUTION INTRAMUSCULAR; INTRAVENOUS; SUBCUTANEOUS at 21:58

## 2025-07-21 RX ADMIN — ASPIRIN 81 MG: 81 TABLET, CHEWABLE ORAL at 08:22

## 2025-07-21 RX ADMIN — ALLOPURINOL 100 MG: 100 TABLET ORAL at 08:21

## 2025-07-21 RX ADMIN — HYDROMORPHONE HYDROCHLORIDE 0.2 MG: 0.2 INJECTION, SOLUTION INTRAMUSCULAR; INTRAVENOUS; SUBCUTANEOUS at 08:21

## 2025-07-21 RX ADMIN — Medication 2.5 MG: at 01:00

## 2025-07-21 RX ADMIN — SENNOSIDES 8.6 MG: 8.6 TABLET, FILM COATED ORAL at 12:03

## 2025-07-21 RX ADMIN — SEVELAMER HYDROCHLORIDE 1600 MG: 800 TABLET ORAL at 12:03

## 2025-07-21 RX ADMIN — HYDROMORPHONE HYDROCHLORIDE 0.2 MG: 0.2 INJECTION, SOLUTION INTRAMUSCULAR; INTRAVENOUS; SUBCUTANEOUS at 02:13

## 2025-07-21 RX ADMIN — FUROSEMIDE 20 MG: 20 TABLET ORAL at 16:30

## 2025-07-21 RX ADMIN — HYDROMORPHONE HYDROCHLORIDE 0.2 MG: 0.2 INJECTION, SOLUTION INTRAMUSCULAR; INTRAVENOUS; SUBCUTANEOUS at 13:44

## 2025-07-21 RX ADMIN — IPRATROPIUM BROMIDE AND ALBUTEROL SULFATE 3 ML: .5; 3 SOLUTION RESPIRATORY (INHALATION) at 15:47

## 2025-07-21 RX ADMIN — METOPROLOL SUCCINATE 25 MG: 25 TABLET, EXTENDED RELEASE ORAL at 08:22

## 2025-07-21 RX ADMIN — METOPROLOL SUCCINATE 25 MG: 25 TABLET, EXTENDED RELEASE ORAL at 21:58

## 2025-07-21 RX ADMIN — ATORVASTATIN CALCIUM 80 MG: 80 TABLET, FILM COATED ORAL at 16:30

## 2025-07-21 RX ADMIN — SEVELAMER HYDROCHLORIDE 1600 MG: 800 TABLET ORAL at 08:21

## 2025-07-21 RX ADMIN — HEPARIN SODIUM 5000 UNITS: 5000 INJECTION INTRAVENOUS; SUBCUTANEOUS at 05:04

## 2025-07-21 NOTE — ASSESSMENT & PLAN NOTE
Required 3 L on admission; now down to 2  Suspect this is secondary to volume overload in the setting of missed dialysis sessions  Should be able to titrate oxygen down after volume removal with HD

## 2025-07-21 NOTE — WOUND OSTOMY CARE
Progress Note - Wound   Naresh Carpio 65 y.o. male MRN: [id number]  Unit/Bed#: [address] [place] [address] Encounter: [id number]        Assessment:   This is a 65 year old male patient admitted on 7/20/25 with encephalopathy. Patient has history of HTN, DM 2, PAD, ESRD on chronic hemodialysis and multiple falls. He was AAO x 2 and agreeable to have wound visit done. He was turned and repositioned with assist of 2 persons. He is continent of small amounts of urine with no record of BM.    Assessment Findings:  1-Full thickness diabetic wounds to bilateral toes with dry black eschar and no drainage. Orders are in place for wound care.  2-Full thickness diabetic wound to left lateral plantar foot with yellow slough, pink granulation tissue and small green/purulent drainage. Orders are in place for wound care and for prevention. Patient refusing Prevalon boots.  3-Partial thickness traumatic wound to right lateral lower leg with pink non-granulation tissue & small serosanguinous drainage. Orders are in place for wound care.  4-Partial thickness serum filled blister to left anterior lower leg with no drainage. Orders are in place for wound care.  5-Full thickness traumatic wound to left medial lower leg with pink granulation tissue, yellow slough and no drainage. Orders are in place for wound care.  6-Bilateral heels and sacrobuttocks are intact - orders are in place for skin care and for prevention.    Wound Care Plan:  1-Cleanse wounds to right 2nd & 3rd toes, left 1st & 2nd toes with VASHE or NSS soaked gauze and pat dry. Apply Betadine to wounds, gauze, rolled gauze and tape. Change every other day & prn soilage/dislodgement.  2-Cleanse wound to left lateral plantar foot with VASHE or NSS soaked gauze and pat dry. Cut strip of Melgisorb Ag and pack wound lightly. Cover with ABD, rolled gauze and tape. Change every other day & prn soilage/dislodgement.  3-Cleanse wound to right lateral lower leg, left medial lower leg, left  anterior lower leg (blister) with VASHE or NSS soaked gauze and pat dry. Apply single layer of Dermagran to wounds, ABD, rolled gauze and tape. Change every other day & prn soilage/dislodgement.  4-Apply Prevent barrier cream to bilateral sacrobuttocks BID and PRN and to heels BID and PRN or with dressing changes.  5-Heel lift boots to be worn to bilateral heels at all times in bed and after transfer to reclining chair if able. If patient unable to tolerate, must float heels on 2 pillows to offload pressure. Heels must not come in contact with mattress or pillows.  6-Ehob pressure redistribution cushion in chair when out of bed if able. Avoid prolonged sitting.  7-Moisturize skin daily with skin nourishing cream.  8-Turn/reposition q2h or when medically stable for pressure re-distribution on skin.     Wound 06/04/25 Diabetic Ulcer Plantar Left;Lateral;Plantar (Active)   Wound Image   07/21/25 1221   Wound Description Slough;Yellow;Granulation Tissue;Pink 07/21/25 1221   Non-staged Wound Description Full thickness 07/21/25 1221   Wound Length (cm) 1 cm 07/21/25 1221   Wound Width (cm) 1 cm 07/21/25 1221   Wound Depth (cm) 0.4 cm 07/21/25 1221   Wound Surface Area (cm^2) 0.79 cm^2 07/21/25 1221   Wound Volume (cm^3) 0.209 cm^3 07/21/25 1221   Calculated Wound Volume (cm^3) 0.4 cm^3 07/21/25 1221   Change in Wound Size % -700 07/21/25 1221   Number of underminings 1 07/21/25 1221   Undermining 1 0.4 07/21/25 1221   Undermining 1 is depth extending from 12-12:00 07/21/25 1221   Drainage Amount Small 07/21/25 1221   Drainage Description Green;Purulent 07/21/25 1221   Liliana-wound Assessment Dry 07/21/25 1221   Treatments Cleansed 07/21/25 1221   Dressing Calcium Alginate with Silver;ABD;Dry dressing 07/21/25 1221   Dressing Changed New 07/21/25 1221   Dressing Status Clean;Dry;Intact 07/21/25 1221       Wound 07/20/25 Other (Comment) Leg Right;Lateral;Distal (Active)   Wound Image   07/21/25 1236   Wound Description  Pink;Non-granulation tissue 07/21/25 1236   Non-staged Wound Description Partial thickness 07/21/25 1236   Wound Length (cm) 0.5 cm 07/21/25 1236   Wound Width (cm) 1 cm 07/21/25 1236   Wound Depth (cm) 0.1 cm 07/21/25 1236   Wound Surface Area (cm^2) 0.39 cm^2 07/21/25 1236   Wound Volume (cm^3) 0.026 cm^3 07/21/25 1236   Calculated Wound Volume (cm^3) 0.05 cm^3 07/21/25 1236   Drainage Amount Small 07/21/25 1236   Drainage Description Serosanguineous 07/21/25 1236   Liliana-wound Assessment Intact 07/21/25 1236   Treatments Cleansed 07/21/25 1236   Dressing Dermagran gauze;ABD;Dry dressing 07/21/25 1236   Dressing Changed New 07/21/25 1236   Dressing Status Clean;Dry;Intact 07/21/25 1236       Wound 07/20/25 Diabetic Ulcer Toe D3, third Anterior;Right (Active)   Wound Image   07/21/25 1240   Wound Description Black;Eschar 07/21/25 1240   Non-staged Wound Description Full thickness 07/21/25 1240   Wound Length (cm) 0.2 cm 07/21/25 1240   Wound Width (cm) 0.2 cm 07/21/25 1240   Wound Depth (cm) 0.2 cm 07/21/25 1240   Wound Surface Area (cm^2) 0.03 cm^2 07/21/25 1240   Wound Volume (cm^3) 0.004 cm^3 07/21/25 1240   Calculated Wound Volume (cm^3) 0.01 cm^3 07/21/25 1240   Drainage Amount None 07/21/25 1240   Liliana-wound Assessment Intact 07/21/25 1240   Treatments Cleansed 07/21/25 1240   Dressing Betadine;Gauze;Dry dressing 07/21/25 1240   Dressing Changed New 07/21/25 1240   Dressing Status Clean;Dry;Intact 07/21/25 1240       Wound 07/20/25 Vascular Ulcer Venous Leg Left;Anterior;Distal (Active)   Wound Image   07/21/25 1223   Wound Description Intact;Serum filled blister 07/21/25 1223   Non-staged Wound Description Partial thickness 07/21/25 1223   Wound Length (cm) 1.3 cm 07/21/25 1223   Wound Width (cm) 1.5 cm 07/21/25 1223   Wound Depth (cm) 0 cm 07/21/25 1223   Wound Surface Area (cm^2) 1.53 cm^2 07/21/25 1223   Wound Volume (cm^3) 0 cm^3 07/21/25 1223   Calculated Wound Volume (cm^3) 0 cm^3 07/21/25 1223    Drainage Amount None 07/21/25 1223   Liliana-wound Assessment Intact 07/21/25 1223   Treatments Cleansed 07/21/25 1223   Dressing Dermagran gauze;ABD;Dry dressing 07/21/25 1223   Dressing Changed New 07/21/25 1223   Dressing Status Clean;Dry;Intact 07/21/25 1223       Wound 07/20/25 Traumatic Leg Left;Medial;Lower (Active)   Wound Image   07/21/25 1226   Wound Description Granulation tissue;Pink;Slough;Yellow;  Eschar;Black 07/21/25 1226   Non-staged Wound Description Full thickness 07/21/25 1226   Wound Length (cm) 2 cm 07/21/25 1226   Wound Width (cm) 0.3 cm 07/21/25 1226   Wound Depth (cm) 0.1 cm 07/21/25 1226   Wound Surface Area (cm^2) 0.47 cm^2 07/21/25 1226   Wound Volume (cm^3) 0.031 cm^3 07/21/25 1226   Calculated Wound Volume (cm^3) 0.06 cm^3 07/21/25 1226   Drainage Amount None 07/21/25 1226   Liilana-wound Assessment Intact 07/21/25 1226   Treatments Cleansed 07/21/25 1226   Dressing Dermagran gauze;ABD;Dry dressing 07/21/25 1226   Dressing Changed New 07/21/25 1226   Dressing Status Clean;Dry;Intact 07/21/25 1226       Wound 07/20/25 Diabetic Ulcer Foot Left;Medial (Active)   Wound Image   07/21/25 1218   Wound Description Dry;Black;Eschar 07/21/25 1218   Non-staged Wound Description Full thickness 07/21/25 1218   Wound Length (cm) 0.5 cm 07/21/25 1218   Wound Width (cm) 0.5 cm 07/21/25 1218   Wound Depth (cm) 0.1 cm 07/21/25 1218   Wound Surface Area (cm^2) 0.2 cm^2 07/21/25 1218   Wound Volume (cm^3) 0.013 cm^3 07/21/25 1218   Calculated Wound Volume (cm^3) 0.03 cm^3 07/21/25 1218   Drainage Amount None 07/21/25 1218   Liliana-wound Assessment Intact 07/21/25 1218   Treatments Cleansed 07/21/25 1218   Dressing Betadine;Gauze;Dry dressing 07/21/25 1218   Dressing Changed New 07/21/25 1218   Dressing Status Clean;Dry;Intact 07/21/25 1218       Wound 07/20/25 Diabetic Ulcer Toe D1, great Left;Medial (Active)   Wound Image   07/21/25 1216   Wound Description Black;Eschar 07/21/25 1216   Non-staged Wound  Description Full thickness 07/21/25 1216   Wound Length (cm) 1 cm 07/21/25 1216   Wound Width (cm) 1 cm 07/21/25 1216   Wound Depth (cm) 0.1 cm 07/21/25 1216   Wound Surface Area (cm^2) 0.79 cm^2 07/21/25 1216   Wound Volume (cm^3) 0.052 cm^3 07/21/25 1216   Calculated Wound Volume (cm^3) 0.1 cm^3 07/21/25 1216   Drainage Amount None 07/21/25 1216   Liliana-wound Assessment Intact 07/21/25 1216   Treatments Cleansed 07/21/25 1216   Dressing Betadine;Gauze;Dry dressing 07/21/25 1216   Dressing Changed New 07/21/25 1216   Dressing Status Clean;Dry;Intact 07/21/25 1216       Wound 07/20/25 Diabetic Ulcer Toe D2, second Anterior;Left (Active)   Wound Image   07/21/25 1217   Wound Description Black;Eschar 07/21/25 1217   Non-staged Wound Description Full thickness 07/21/25 1217   Wound Length (cm) 0.5 cm 07/21/25 1217   Wound Width (cm) 1 cm 07/21/25 1217   Wound Depth (cm) 0.1 cm 07/21/25 1217   Wound Surface Area (cm^2) 0.39 cm^2 07/21/25 1217   Wound Volume (cm^3) 0.026 cm^3 07/21/25 1217   Calculated Wound Volume (cm^3) 0.05 cm^3 07/21/25 1217   Drainage Amount None 07/21/25 1217   Liliana-wound Assessment Intact 07/21/25 1217   Treatments Cleansed 07/21/25 1217   Dressing Betadine;Gauze;Dry dressing 07/21/25 1217   Dressing Changed New 07/21/25 1217   Dressing Status Clean;Dry;Intact 07/21/25 1217       Wound 07/21/25 Diabetic Ulcer Toe D2, second Anterior;Right (Active)   Wound Image   07/21/25 1241   Wound Description Black;Eschar;Dry 07/21/25 1241   Non-staged Wound Description Full thickness 07/21/25 1241   Wound Length (cm) 0.3 cm 07/21/25 1241   Wound Width (cm) 0.4 cm 07/21/25 1241   Wound Depth (cm) 0.1 cm 07/21/25 1241   Wound Surface Area (cm^2) 0.09 cm^2 07/21/25 1241   Wound Volume (cm^3) 0.006 cm^3 07/21/25 1241   Calculated Wound Volume (cm^3) 0.01 cm^3 07/21/25 1241   Drainage Amount None 07/21/25 1241   Liliana-wound Assessment Intact 07/21/25 1241   Treatments Cleansed 07/21/25 1241   Dressing  Betadine;Gauze;Dry dressing 07/21/25 1241   Dressing Changed New 07/21/25 1241   Dressing Status Clean;Dry;Intact 07/21/25 1241     Discussed assessment findings, and plan of care/recommendations with Esther RN.    Wound care will follow along with patient weekly, please call or text with questions and concerns.    Recommendations written as orders.  Love Andrew MSN, RN, CWON

## 2025-07-21 NOTE — ASSESSMENT & PLAN NOTE
Toxic metabolic encephalopathy secondary to missed dialysis sessions  Status post urgent HD yesterday 7/20, plan for an additional suction with extra ultrafiltration tomorrow 7/22  Clinically his mental status appears to be back at baseline now after HD.

## 2025-07-21 NOTE — ASSESSMENT & PLAN NOTE
Home Rx: Sevelamer 1600 mg TID.   Check phos.  Last phosphorus level was 4.2 on 7/12.  Continue sevelamer

## 2025-07-21 NOTE — PLAN OF CARE
Problem: Prexisting or High Potential for Compromised Skin Integrity  Goal: Skin integrity is maintained or improved  Description: INTERVENTIONS:  - Identify patients at risk for skin breakdown  - Assess and monitor skin integrity including under and around medical devices   - Assess and monitor nutrition and hydration status  - Monitor labs  - Assess for incontinence   - Turn and reposition patient  - Assist with mobility/ambulation  - Relieve pressure over chente prominences   - Avoid friction and shearing  - Provide appropriate hygiene as needed including keeping skin clean and dry  - Evaluate need for skin moisturizer/barrier cream  - Collaborate with interdisciplinary team  - Patient/family teaching  - Consider wound care consult    Assess:  - Review Praful scale daily  - Clean and moisturize skin every shift  - Inspect skin when repositioning, toileting, and assisting with ADLS  - Assess under medical devices such as multi podus boot every shift  - Assess extremities for adequate circulation and sensation     Bed Management:  - Have minimal linens on bed & keep smooth, unwrinkled  - Change linens as needed when moist or perspiring  - Avoid sitting or lying in one position for more than 1 hours while in bed?Keep HOB at 30 degrees   - Toileting:  - Offer bedside commode  - Assess for incontinence every 2 hours  - Use incontinent care products after each incontinent episode such as skin barrier cream    Activity:  - Mobilize patient 3 times a day  - Encourage activity and walks on unit  - Encourage or provide ROM exercises   - Turn and reposition patient every 2 Hours  - Use appropriate equipment to lift or move patient in bed  - Instruct/ Assist with weight shifting every 30 mins when out of bed in chair  - Consider limitation of chair time 1 hour intervals    Skin Care:  - Avoid use of baby powder, tape, friction and shearing, hot water or constrictive clothing  - Relieve pressure over bony prominences using  pillows  - Do not massage red bony areas    Next Steps:  - Teach patient strategies to minimize risks such as skin breakdown  - Consider consults to  interdisciplinary teams such as pt/ot  Outcome: Progressing     Problem: METABOLIC, FLUID AND ELECTROLYTES - ADULT  Goal: Electrolytes maintained within normal limits  Description: INTERVENTIONS:  - Monitor labs and assess patient for signs and symptoms of electrolyte imbalances  - Administer electrolyte replacement as ordered  - Monitor response to electrolyte replacements, including repeat lab results as appropriate  - Instruct patient on fluid and nutrition as appropriate  Outcome: Progressing  Goal: Fluid balance maintained  Description: INTERVENTIONS:  - Monitor labs   - Monitor I/O and WT  - Instruct patient on fluid and nutrition as appropriate  - Assess for signs & symptoms of volume excess or deficit  Outcome: Progressing  Goal: Glucose maintained within target range  Description: INTERVENTIONS:  - Monitor Blood Glucose as ordered  - Assess for signs and symptoms of hyperglycemia and hypoglycemia  - Administer ordered medications to maintain glucose within target range  - Assess nutritional intake and initiate nutrition service referral as needed  Outcome: Progressing     Problem: HEMATOLOGIC - ADULT  Goal: Maintains hematologic stability  Description: INTERVENTIONS  - Assess for signs and symptoms of bleeding or hemorrhage  - Monitor labs  - Administer supportive blood products/factors as ordered and appropriate  Outcome: Progressing     Problem: RESPIRATORY - ADULT  Goal: Achieves optimal ventilation and oxygenation  Description: INTERVENTIONS:  - Assess for changes in respiratory status  - Assess for changes in mentation and behavior  - Position to facilitate oxygenation and minimize respiratory effort  - Oxygen administered by appropriate delivery if ordered  - Initiate smoking cessation education as indicated  - Encourage broncho-pulmonary hygiene  including cough, deep breathe, Incentive Spirometry  - Assess the need for suctioning and aspirate as needed  - Assess and instruct to report SOB or any respiratory difficulty  - Respiratory Therapy support as indicated  Outcome: Progressing     Problem: INFECTION - ADULT  Goal: Absence or prevention of progression during hospitalization  Description: INTERVENTIONS:  - Assess and monitor for signs and symptoms of infection  - Monitor lab/diagnostic results  - Monitor all insertion sites, i.e. indwelling lines, tubes, and drains  - Monitor endotracheal if appropriate and nasal secretions for changes in amount and color  - [place] appropriate cooling/warming therapies per order  - Administer medications as ordered  - Instruct and encourage patient and family to use good hand hygiene technique  - Identify and instruct in appropriate isolation precautions for identified infection/condition  Outcome: Progressing  Goal: Absence of fever/infection during neutropenic period  Description: INTERVENTIONS:  - Monitor WBC  - Perform strict hand hygiene  - Limit to healthy visitors only  - No plants, dried, fresh or silk flowers with quiñonez in patient room  Outcome: Progressing     Problem: SAFETY ADULT  Goal: Patient will remain free of falls  Description: INTERVENTIONS:  - Educate patient/family on patient safety including physical limitations  - Instruct patient to call for assistance with activity   - Consider consulting OT/PT to assist with strengthening/mobility based on AM PAC & JH-HLM score  - Consult OT/PT to assist with strengthening/mobility   - Keep Call bell within reach  - Keep bed low and locked with side rails adjusted as appropriate  - Keep care items and personal belongings within reach  - Initiate and maintain comfort rounds  - Make Fall Risk Sign visible to staff  - Offer Toileting every 2 Hours, in advance of need  - Initiate/Maintain bed alarm  - Obtain necessary fall risk management equipment: alarm  -  Apply yellow socks and bracelet for high fall risk patients  - Consider moving patient to room near nurses station  Outcome: Progressing  Goal: Maintain or return to baseline ADL function  Description: INTERVENTIONS:  -  Assess patient's ability to carry out ADLs; assess patient's baseline for ADL function and identify physical deficits which impact ability to perform ADLs (bathing, care of mouth/teeth, toileting, grooming, dressing, etc.)  - Assess/evaluate cause of self-care deficits   - Assess range of motion  - Assess patient's mobility; develop plan if impaired  - Assess patient's need for assistive devices and provide as appropriate  - Encourage maximum independence but intervene and supervise when necessary  - Involve family in performance of ADLs  - Assess for home care needs following discharge   - Consider OT consult to assist with ADL evaluation and planning for discharge  - Provide patient education as appropriate  - Monitor functional capacity and physical performance, use of AM PAC & JH-HLM   - Monitor gait, balance and fatigue with ambulation    Outcome: Progressing  Goal: Maintains/Returns to pre admission functional level  Description: INTERVENTIONS:  - Perform AM-PAC 6 Click Basic Mobility/ Daily Activity assessment daily.  - Set and communicate daily mobility goal to care team and patient/family/caregiver.   - Collaborate with rehabilitation services on mobility goals if consulted  - Perform Range of Motion 3 times a day.  - Reposition patient every 2 hours.  - Dangle patient 3 times a day  - Stand patient 3 times a day  - Ambulate patient 3 times a day  - Out of bed to chair 3 times a day   - Out of bed for meals 3 times a day  - Out of bed for toileting  - Record patient progress and toleration of activity level   Outcome: Progressing     Problem: DISCHARGE PLANNING  Goal: Discharge to home or other facility with appropriate resources  Description: INTERVENTIONS:  - Identify barriers to  discharge w/patient and caregiver  - Arrange for needed discharge resources and transportation as appropriate  - Identify discharge learning needs (meds, wound care, etc.)  - Arrange for interpretive services to assist at discharge as needed  - Refer to Case Management Department for coordinating discharge planning if the patient needs post-hospital services based on physician/advanced practitioner order or complex needs related to functional status, cognitive ability, or social support system  Outcome: Progressing     Problem: Knowledge Deficit  Goal: Patient/family/caregiver demonstrates understanding of disease process, treatment plan, medications, and discharge instructions  Description: Complete learning assessment and assess knowledge base.  Interventions:  - Provide teaching at level of understanding  - Provide teaching via preferred learning methods  Outcome: Progressing

## 2025-07-21 NOTE — PROGRESS NOTES
Progress Note - Nephrology   Name: Naresh Carpio 65 y.o. male I MRN: [id number]  Unit/Bed#: 3 [place] [address] I Date of Admission: 7/20/2025   Date of Service: 7/21/2025 I Hospital Day: 0    Assessment & Plan  ESRD (end stage renal disease) (Formerly [facility])  [facility] TTS  Access: R IJ permcath.   EDW 89.5 kg.   Status post urgent HD on 7/20 for fluid overload and hyperkalemia  Volume status acceptable today, next dialysis treatment tomorrow per schedule    Encephalopathy  Mental status has improved and is back to baseline, oriented x 3 currently.  Possibility of uremic encephalopathy from missed couple of dialysis treatment cannot be ruled out    Acute respiratory failure with hypoxia (Formerly [facility])  Chest x-ray with vascular congestion.  Status post urgent HD on 7/20 and volume status improving currently on oxygen nasal cannula.  Next   treatment tomorrow with ultrafiltration    Essential hypertension  Blood pressure is acceptable.  Home Rx: Nifedipine 30 mg daily, Metoprolol succ 25 mg BID?, furosemide 20 mg twice a day.  Continue home antihypertensive medications    Anemia in ESRD (end-stage renal disease)  (Formerly [facility])  Hemoglobin currently at target range at 10.1 g/dL, continue to monitor  He is on Mircera in the outpatient setting.    Chronic kidney disease-mineral and bone disorder  Home Rx: Sevelamer 1600 mg TID.   Check phos.  Last phosphorus level was 4.2 on 7/12.  Continue sevelamer    PAD (peripheral artery disease) (Formerly [facility])  -Continue management per primary team on home aspirin and statins  Paroxysmal atrial fibrillation (Formerly [facility])  -On metoprolol and nifedipine.  Management per primary team not on anticoagulants at home    I have reviewed the nephrology recommendations including next dialysis treatment tomorrow, continue pain management per primary team, with primary team, and we are in agreement with renal plan including the information outlined above.     Subjective   Complaining of severe back pain and asking for pain  "medications.  Patient is more awake alert and oriented.  No chest pain or shortness of breath    Objective :  Temp:  [97.9 °F (36.6 °C)-98.6 °F (37 °C)] 98.6 °F (37 °C)  HR:  [58-82] 65  BP: ()/(41-70) 159/70  Resp:  [18-20] 18  SpO2:  [93 %-100 %] 96 %  O2 Device: Nasal cannula  Nasal Cannula O2 Flow Rate (L/min):  [2 L/min-3 L/min] 2 L/min    Current Weight: Weight - Scale: 98.1 kg (216 lb 4.3 oz)  First Weight: Weight - Scale: 98.1 kg (216 lb 4.3 oz)  I/O         07/19 0701 07/20 0700 07/20 0701 07/21 0700 07/21 0701 07/22 0700    P.O.  250     I.V. (mL/kg)  500 (5.1)     Total Intake(mL/kg)  750 (7.6)     Urine (mL/kg/hr)  250 (0.1)     Other  2500     Total Output  2750     Net  -2000                  Physical Exam   General:  Ill looking, awake.  On oxygen by nasal cannula  Eyes: Conjunctivae pink,  Sclera anicteric  ENT: lips and mucous membranes moist  Neck: supple   Chest: Clear to Auscultation both lungs,  no crackles, ronchus or wheezing.  CVS: S1 & S2 present, normal rate, regular rhythm, no murmur.  Abdomen: soft, non-tender, non-distended, Bowel sounds normoactive  Extremities: no edema of  legs.  Dressing over left foot  Skin: no rash  Neuro: awake, alert, oriented  Psych: Mood and affect appropriate    Medications:  Current Medications[1]      Lab Results: I have reviewed the following results:  Results from last 7 days   Lab Units 07/21/25  0601 07/20/25  0354   WBC Thousand/uL 5.56 8.32   HEMOGLOBIN g/dL 10.1* 9.9*   HEMATOCRIT % 33.4* 31.9*   PLATELETS Thousands/uL 205 219   POTASSIUM mmol/L 4.3 5.4*   CHLORIDE mmol/L 97 97   CO2 mmol/L 29 27   BUN mg/dL 23 37*   CREATININE mg/dL 4.44* 5.61*   CALCIUM mg/dL 8.7 8.6   MAGNESIUM mg/dL 2.0  --    ALBUMIN g/dL  --  3.4*       Administrative Statements     Portions of the record may have been created with voice recognition software. Occasional wrong word or \"sound a like\" substitutions may have occurred due to the inherent limitations of voice " recognition software. Read the chart carefully and recognize, using context, where substitutions have occurred.If you have any questions, please contact the dictating provider.       [1]   Current Facility-Administered Medications:     acetaminophen (TYLENOL) tablet 650 mg, 650 mg, Oral, Q6H PRN, Gennaro Zelaya DO, 650 mg at 07/20/25 2105    allopurinol (ZYLOPRIM) tablet 100 mg, 100 mg, Oral, Daily, Gennaro Zelaya DO, 100 mg at 07/21/25 0821    aspirin chewable tablet 81 mg, 81 mg, Oral, Daily, Gennaro Zelaya DO, 81 mg at 07/21/25 0822    atorvastatin (LIPITOR) tablet 80 mg, 80 mg, Oral, Daily With Dinner, Gennaro Zelaya DO, 80 mg at 07/20/25 1658    famotidine (PEPCID) tablet 20 mg, 20 mg, Oral, HS, Gennaro Zelaya DO, 20 mg at 07/20/25 2105    furosemide (LASIX) tablet 20 mg, 20 mg, Oral, BID (diuretic), Gennaro Zelaya DO, 20 mg at 07/21/25 0822    [Held by provider] gabapentin (NEURONTIN) capsule 100 mg, 100 mg, Oral, Once per day on Monday Wednesday Friday, Gennaro Zelaya DO    heparin (porcine) subcutaneous injection 5,000 Units, 5,000 Units, Subcutaneous, Q8H KEMAL, Gennaro Zelaya DO, 5,000 Units at 07/21/25 0504    HYDROmorphone HCl (DILAUDID) injection 0.2 mg, 0.2 mg, Intravenous, Q4H PRN, GRANT Rich, 0.2 mg at 07/21/25 0821    ipratropium-albuterol (DUO-NEB) 0.5-2.5 mg/3 mL inhalation solution 3 mL, 3 mL, Nebulization, 4x Daily, Gennaro Zelaya DO    lidocaine (LIDODERM) 5 % patch 1 patch, 1 patch, Topical, Daily, GRANT Rich, 1 patch at 07/21/25 0213    metoprolol succinate (TOPROL-XL) 24 hr tablet 25 mg, 25 mg, Oral, Q12H KEMAL, Gennaro Zelaya DO, 25 mg at 07/21/25 0822    NIFEdipine (PROCARDIA XL) 24 hr tablet 30 mg, 30 mg, Oral, After Dinner, Gennaro Zelaya DO, 30 mg at 07/20/25 1819    ondansetron (ZOFRAN) injection 4 mg, 4 mg, Intravenous, Q6H PRN, Gennaro Zelaya DO    oxyCODONE  (ROXICODONE) split tablet 2.5 mg, 2.5 mg, Oral, Q8H PRN, Gennaro Zelaya DO, 2.5 mg at 07/21/25 0100    senna (SENOKOT) tablet 8.6 mg, 8.6 mg, Oral, Daily With Lunch, Gennaro Zelaya DO, 8.6 mg at 07/20/25 1125    sevelamer (RENAGEL) tablet 1,600 mg, 1,600 mg, Oral, TID With Meals, Gennaro Zelaya DO, 1,600 mg at 07/21/25 0821

## 2025-07-21 NOTE — PROGRESS NOTES
Progress Note - Hospitalist   Name: Naresh Carpio 65 y.o. male I MRN: [id number]  Unit/Bed#: [address] [place], [place] [id number] I Date of Admission: 7/20/2025   Date of Service: 7/21/2025 I Hospital Day: 0    Assessment & Plan  Encephalopathy  Toxic metabolic encephalopathy secondary to missed dialysis sessions  Status post urgent HD yesterday 7/20, plan for an additional suction with extra ultrafiltration tomorrow 7/22  Clinically his mental status appears to be back at baseline now after HD.  ESRD (end stage renal disease) ([facility])  Lab Results   Component Value Date    EGFR 12 07/21/2025    EGFR 9 07/20/2025    EGFR 9 07/12/2025    CREATININE 4.44 (H) 07/21/2025    CREATININE 5.61 (H) 07/20/2025    CREATININE 5.70 (H) 07/12/2025   As above, urgent HD today  Appreciate nephrology recommendations   continue home binders    Neuropathy  On gabapentin 100 mg Monday/Wednesday/Friday prior to arrival  Patient stating it is not working, will increase to 100 mg daily  Acute respiratory failure with hypoxia ([facility])  Required 3 L on admission; now down to 2  Suspect this is secondary to volume overload in the setting of missed dialysis sessions  Should be able to titrate oxygen down after volume removal with HD  Essential hypertension  Continue home metoprolol, nifedipine, Lasix  Monitor  PAD (peripheral artery disease) ([facility])  No acute concerns from the standpoint  Chronic venous stasis changes on the legs bilaterally  Continue home aspirin and statin  Paroxysmal atrial fibrillation ([facility])  Continue metoprolol and nifedipine for rate control  Not on any anticoagulants at home  Heparin subcu for DVT prophylaxis  Anemia in ESRD (end-stage renal disease)  ([facility])  Chronic anemia at baseline  Monitor  Chronic kidney disease-mineral and bone disorder  Continue home binders    VTE Pharmacologic Prophylaxis: VTE Score: 3 Moderate Risk (Score 3-4) - Pharmacological DVT Prophylaxis Ordered: heparin.    Mobility:   Basic Mobility Inpatient Raw Score:  11  JH-HLM Goal: 4: Move to chair/commode  JH-HLM Achieved: 2: Bed activities/Dependent transfer  JH-HLM Goal NOT achieved. Continue with multidisciplinary rounding and encourage appropriate mobility to improve upon JH-HLM goals.    Patient Centered Rounds: I performed bedside rounds with nursing staff today.   Discussions with Specialists or Other Care Team Provider: [facility]    Education and Discussions with Family / Patient: Updated  (sister) via phone.    Current Length of Stay: 0 day(s)  Current Patient Status: Inpatient   Certification Statement: The patient will continue to require additional inpatient hospital stay due to hemodialysis, clinical monitoring  Discharge Plan: Anticipate discharge tomorrow to prior long term care facility    Code Status: Level 1 - Full Code    Subjective   Patient feeling better today though biggest complaint is pain in his back and left lower extremity.  He also complains of neuropathic pain.  Mental status much improved.  Still on 2 L of oxygen    Objective :  Temp:  [97.9 °F (36.6 °C)-98.7 °F (37.1 °C)] 98.7 °F (37.1 °C)  HR:  [58-82] 70  BP: ()/(41-70) 153/67  Resp:  [18-20] 20  SpO2:  [93 %-100 %] 93 %  O2 Device: Nasal cannula  Nasal Cannula O2 Flow Rate (L/min):  [2 L/min-3 L/min] 2 L/min    Body mass index is 34.91 kg/m².     Input and Output Summary (last 24 hours):     Intake/Output Summary (Last 24 hours) at 7/21/2025 1120  Last data filed at 7/21/2025 0100  Gross per 24 hour   Intake 750 ml   Output 2750 ml   Net -2000 ml       PHYSICAL EXAM:    Vitals signs reviewed  Constitutional   Awake and cooperative. NAD.   Head/Neck   Normocephalic. Atraumatic.   HEENT   No scleral icterus. EOMI.   Heart   Regular rate and rhythm. No murmurs.   Lungs   Clear to auscultation bilaterally. Respirations unlaboured.   Abdomen   Soft. Nontender. Nondistended.    Skin   Skin color normal. No rashes.   Extremities   No deformities. No peripheral edema.    Neuro   Alert and oriented. No new deficits.   Psych   Mood stable. Affect normal.         Lines/Drains:  Lines/Drains/Airways       Active Status       Name Placement date Placement time Site Days    HD Permanent Double Catheter 08/30/23  --  Internal jugular  691                            Lab Results: I have reviewed the following results:   Results from last 7 days   Lab Units 07/21/25  0601 07/20/25  0354   WBC Thousand/uL 5.56 8.32   HEMOGLOBIN g/dL 10.1* 9.9*   HEMATOCRIT % 33.4* 31.9*   PLATELETS Thousands/uL 205 219   SEGS PCT %  --  76*   LYMPHO PCT %  --  10*   MONO PCT %  --  12   EOS PCT %  --  2     Results from last 7 days   Lab Units 07/21/25  0601 07/20/25  0354   SODIUM mmol/L 135 133*   POTASSIUM mmol/L 4.3 5.4*   CHLORIDE mmol/L 97 97   CO2 mmol/L 29 27   BUN mg/dL 23 37*   CREATININE mg/dL 4.44* 5.61*   ANION GAP mmol/L 9 9   CALCIUM mg/dL 8.7 8.6   ALBUMIN g/dL  --  3.4*   TOTAL BILIRUBIN mg/dL  --  0.45   ALK PHOS U/L  --  89   ALT U/L  --  10   AST U/L  --  13   GLUCOSE RANDOM mg/dL 78 104         Results from last 7 days   Lab Units 07/20/25  0348   POC GLUCOSE mg/dl 108               Recent Cultures (last 7 days):         Imaging Results Review: No pertinent imaging studies reviewed.  Other Study Results Review: No additional pertinent studies reviewed.    Last 24 Hours Medication List:     Current Facility-Administered Medications:     acetaminophen (TYLENOL) tablet 650 mg, Q6H PRN    allopurinol (ZYLOPRIM) tablet 100 mg, Daily    aspirin chewable tablet 81 mg, Daily    atorvastatin (LIPITOR) tablet 80 mg, Daily With Dinner    famotidine (PEPCID) tablet 20 mg, HS    furosemide (LASIX) tablet 20 mg, BID (diuretic)    [START ON 7/22/2025] gabapentin (NEURONTIN) capsule 100 mg, Daily    heparin (porcine) subcutaneous injection 5,000 Units, Q8H KEMAL    HYDROmorphone HCl (DILAUDID) injection 0.2 mg, Q4H PRN    ipratropium-albuterol (DUO-NEB) 0.5-2.5 mg/3 mL inhalation solution 3 mL, 4x Daily     lidocaine (LIDODERM) 5 % patch 1 patch, Daily    metoprolol succinate (TOPROL-XL) 24 hr tablet 25 mg, Q12H KEMAL    NIFEdipine (PROCARDIA XL) 24 hr tablet 30 mg, After Dinner    ondansetron (ZOFRAN) injection 4 mg, Q6H PRN    oxyCODONE (ROXICODONE) split tablet 2.5 mg, Q8H PRN    senna (SENOKOT) tablet 8.6 mg, Daily With Lunch    sevelamer (RENAGEL) tablet 1,600 mg, TID With Meals    Administrative Statements   Today, Patient Was Seen By: Gennaro Zelaya DO      **Please Note: This note may have been constructed using a voice recognition system.**

## 2025-07-21 NOTE — ASSESSMENT & PLAN NOTE
Chest x-ray with vascular congestion.  Status post urgent HD on 7/20 and volume status improving currently on oxygen nasal cannula.  Next   treatment tomorrow with ultrafiltration

## 2025-07-21 NOTE — PLAN OF CARE
Problem: METABOLIC, FLUID AND ELECTROLYTES - ADULT  Goal: Electrolytes maintained within normal limits  Description: INTERVENTIONS:  - Monitor labs and assess patient for signs and symptoms of electrolyte imbalances  - Administer electrolyte replacement as ordered  - Monitor response to electrolyte replacements, including repeat lab results as appropriate  - Instruct patient on fluid and nutrition as appropriate  7/20/2025 2201 by Sarah Mujica LPN  Outcome: Progressing  7/20/2025 2200 by Sarah Mujica LPN  Outcome: Progressing  Goal: Fluid balance maintained  Description: INTERVENTIONS:  - Monitor labs   - Monitor I/O and WT  - Instruct patient on fluid and nutrition as appropriate  - Assess for signs & symptoms of volume excess or deficit  Outcome: Progressing  Goal: Glucose maintained within target range  Description: INTERVENTIONS:  - Monitor Blood Glucose as ordered  - Assess for signs and symptoms of hyperglycemia and hypoglycemia  - Administer ordered medications to maintain glucose within target range  - Assess nutritional intake and initiate nutrition service referral as needed  Outcome: Progressing     Problem: HEMATOLOGIC - ADULT  Goal: Maintains hematologic stability  Description: INTERVENTIONS  - Assess for signs and symptoms of bleeding or hemorrhage  - Monitor labs  - Administer supportive blood products/factors as ordered and appropriate  7/20/2025 2201 by Sarah Mujica LPN  Outcome: Progressing  7/20/2025 2200 by Sarah Mujica LPN  Outcome: Progressing     Problem: PAIN - ADULT  Goal: Verbalizes/displays adequate comfort level or baseline comfort level  Description: Interventions:  - Encourage patient to monitor pain and request assistance  - Assess pain using appropriate pain scale  - Administer analgesics as ordered based on type and severity of pain and evaluate response  - Implement non-pharmacological measures as appropriate and evaluate response  - Consider cultural and social  influences on pain and pain management  - Notify physician/advanced practitioner if interventions unsuccessful or patient reports new pain  - Educate patient/family on pain management process including their role and importance of  reporting pain   - Provide non-pharmacologic/complimentary pain relief interventions  7/20/2025 2201 by Sarah Mujica LPN  Outcome: Progressing  7/20/2025 2200 by Sarah Mujica LPN  Outcome: Progressing     Problem: INFECTION - ADULT  Goal: Absence or prevention of progression during hospitalization  Description: INTERVENTIONS:  - Assess and monitor for signs and symptoms of infection  - Monitor lab/diagnostic results  - Monitor all insertion sites, i.e. indwelling lines, tubes, and drains  - Monitor endotracheal if appropriate and nasal secretions for changes in amount and color  - [place] appropriate cooling/warming therapies per order  - Administer medications as ordered  - Instruct and encourage patient and family to use good hand hygiene technique  - Identify and instruct in appropriate isolation precautions for identified infection/condition  7/20/2025 2201 by Sarah Mujica LPN  Outcome: Progressing  7/20/2025 2200 by Sarah Mujica LPN  Outcome: Progressing  Goal: Absence of fever/infection during neutropenic period  Description: INTERVENTIONS:  - Monitor WBC  - Perform strict hand hygiene  - Limit to healthy visitors only  - No plants, dried, fresh or silk flowers with quiñonez in patient room  7/20/2025 2201 by Sarah Mujica LPN  Outcome: Progressing  7/20/2025 2200 by Sarah Mujica LPN  Outcome: Progressing     Problem: SAFETY ADULT  Goal: Maintain or return to baseline ADL function  Description: INTERVENTIONS:  -  Assess patient's ability to carry out ADLs; assess patient's baseline for ADL function and identify physical deficits which impact ability to perform ADLs (bathing, care of mouth/teeth, toileting, grooming, dressing, etc.)  - Assess/evaluate cause of  self-care deficits   - Assess range of motion  - Assess patient's mobility; develop plan if impaired  - Assess patient's need for assistive devices and provide as appropriate  - Encourage maximum independence but intervene and supervise when necessary  - Involve family in performance of ADLs  - Assess for home care needs following discharge   - Consider OT consult to assist with ADL evaluation and planning for discharge  - Provide patient education as appropriate  - Monitor functional capacity and physical performance, use of AM PAC & JH-HLM   - Monitor gait, balance and fatigue with ambulation    7/20/2025 2201 by Sarah Mujica LPN  Outcome: Progressing  7/20/2025 2200 by Sarah Mujica LPN  Outcome: Progressing     Problem: DISCHARGE PLANNING  Goal: Discharge to home or other facility with appropriate resources  Description: INTERVENTIONS:  - Identify barriers to discharge w/patient and caregiver  - Arrange for needed discharge resources and transportation as appropriate  - Identify discharge learning needs (meds, wound care, etc.)  - Arrange for interpretive services to assist at discharge as needed  - Refer to Case Management Department for coordinating discharge planning if the patient needs post-hospital services based on physician/advanced practitioner order or complex needs related to functional status, cognitive ability, or social support system  7/20/2025 2201 by Sarah Mujica LPN  Outcome: Progressing  7/20/2025 2200 by Sarah Mujica LPN  Outcome: Progressing     Problem: Knowledge Deficit  Goal: Patient/family/caregiver demonstrates understanding of disease process, treatment plan, medications, and discharge instructions  Description: Complete learning assessment and assess knowledge base.  Interventions:  - Provide teaching at level of understanding  - Provide teaching via preferred learning methods  7/20/2025 2201 by Sarah Mujica LPN  Outcome: Progressing  7/20/2025 2200 by Sarah Mujica  LPN  Outcome: Progressing

## 2025-07-21 NOTE — ASSESSMENT & PLAN NOTE
Hemoglobin currently at target range at 10.1 g/dL, continue to monitor  He is on Mircera in the outpatient setting.

## 2025-07-21 NOTE — ASSESSMENT & PLAN NOTE
[facility] TTS  Access: R IJ permcath.   EDW 89.5 kg.   Status post urgent HD on 7/20 for fluid overload and hyperkalemia  Volume status acceptable today, next dialysis treatment tomorrow per schedule

## 2025-07-21 NOTE — ASSESSMENT & PLAN NOTE
On gabapentin 100 mg Monday/Wednesday/Friday prior to arrival  Patient stating it is not working, will increase to 100 mg daily

## 2025-07-21 NOTE — ASSESSMENT & PLAN NOTE
Lab Results   Component Value Date    EGFR 12 07/21/2025    EGFR 9 07/20/2025    EGFR 9 07/12/2025    CREATININE 4.44 (H) 07/21/2025    CREATININE 5.61 (H) 07/20/2025    CREATININE 5.70 (H) 07/12/2025   As above, urgent HD today  Appreciate nephrology recommendations   continue home binders

## 2025-07-21 NOTE — PROGRESS NOTES
Intended visit with pt due to presence on GOC list. Pt resting comfortably at this time. Interfaith blessing offered outside of pt room. Available to follow upon request.     Thank you!      07/21/25 1400   Clinical Encounter Type   Visited With Patient not available   Routine Visit Introduction

## 2025-07-21 NOTE — ASSESSMENT & PLAN NOTE
Mental status has improved and is back to baseline, oriented x 3 currently.  Possibility of uremic encephalopathy from missed couple of dialysis treatment cannot be ruled out

## 2025-07-21 NOTE — ASSESSMENT & PLAN NOTE
Blood pressure is acceptable.  Home Rx: Nifedipine 30 mg daily, Metoprolol succ 25 mg BID?, furosemide 20 mg twice a day.  Continue home antihypertensive medications

## 2025-07-22 ENCOUNTER — APPOINTMENT (INPATIENT)
Dept: DIALYSIS | Facility: HOSPITAL | Age: 65
DRG: 640 | End: 2025-07-22
Payer: MEDICARE

## 2025-07-22 LAB
ANION GAP SERPL CALCULATED.3IONS-SCNC: 9 MMOL/L (ref 4–13)
BUN SERPL-MCNC: 33 MG/DL (ref 5–25)
CALCIUM SERPL-MCNC: 8.7 MG/DL (ref 8.4–10.2)
CHLORIDE SERPL-SCNC: 96 MMOL/L (ref 96–108)
CO2 SERPL-SCNC: 28 MMOL/L (ref 21–32)
CREAT SERPL-MCNC: 5.76 MG/DL (ref 0.6–1.3)
GFR SERPL CREATININE-BSD FRML MDRD: 9 ML/MIN/1.73SQ M
GLUCOSE SERPL-MCNC: 111 MG/DL (ref 65–140)
POTASSIUM SERPL-SCNC: 5.1 MMOL/L (ref 3.5–5.3)
SODIUM SERPL-SCNC: 133 MMOL/L (ref 135–147)

## 2025-07-22 PROCEDURE — 97110 THERAPEUTIC EXERCISES: CPT

## 2025-07-22 PROCEDURE — 80048 BASIC METABOLIC PNL TOTAL CA: CPT | Performed by: INTERNAL MEDICINE

## 2025-07-22 PROCEDURE — 94760 N-INVAS EAR/PLS OXIMETRY 1: CPT

## 2025-07-22 PROCEDURE — 99232 SBSQ HOSP IP/OBS MODERATE 35: CPT | Performed by: INTERNAL MEDICINE

## 2025-07-22 PROCEDURE — 97163 PT EVAL HIGH COMPLEX 45 MIN: CPT

## 2025-07-22 PROCEDURE — 5A1D70Z PERFORMANCE OF URINARY FILTRATION, INTERMITTENT, LESS THAN 6 HOURS PER DAY: ICD-10-PCS

## 2025-07-22 PROCEDURE — 99232 SBSQ HOSP IP/OBS MODERATE 35: CPT

## 2025-07-22 RX ORDER — NIFEDIPINE 30 MG/1
30 TABLET, EXTENDED RELEASE ORAL ONCE
Status: DISCONTINUED | OUTPATIENT
Start: 2025-07-22 | End: 2025-07-22

## 2025-07-22 RX ADMIN — NIFEDIPINE 30 MG: 30 TABLET, EXTENDED RELEASE ORAL at 09:55

## 2025-07-22 RX ADMIN — GABAPENTIN 100 MG: 100 CAPSULE ORAL at 08:19

## 2025-07-22 RX ADMIN — FUROSEMIDE 20 MG: 20 TABLET ORAL at 16:24

## 2025-07-22 RX ADMIN — SEVELAMER HYDROCHLORIDE 1600 MG: 800 TABLET ORAL at 13:02

## 2025-07-22 RX ADMIN — Medication 2.5 MG: at 18:26

## 2025-07-22 RX ADMIN — SENNOSIDES 8.6 MG: 8.6 TABLET, FILM COATED ORAL at 13:02

## 2025-07-22 RX ADMIN — ASPIRIN 81 MG: 81 TABLET, CHEWABLE ORAL at 08:19

## 2025-07-22 RX ADMIN — ACETAMINOPHEN 650 MG: 325 TABLET, FILM COATED ORAL at 21:23

## 2025-07-22 RX ADMIN — FAMOTIDINE 20 MG: 20 TABLET, FILM COATED ORAL at 21:23

## 2025-07-22 RX ADMIN — HEPARIN SODIUM 5000 UNITS: 5000 INJECTION INTRAVENOUS; SUBCUTANEOUS at 14:39

## 2025-07-22 RX ADMIN — ATORVASTATIN CALCIUM 80 MG: 80 TABLET, FILM COATED ORAL at 16:24

## 2025-07-22 RX ADMIN — ALLOPURINOL 100 MG: 100 TABLET ORAL at 08:20

## 2025-07-22 RX ADMIN — HYDROMORPHONE HYDROCHLORIDE 0.2 MG: 0.2 INJECTION, SOLUTION INTRAMUSCULAR; INTRAVENOUS; SUBCUTANEOUS at 21:23

## 2025-07-22 RX ADMIN — METOPROLOL SUCCINATE 25 MG: 25 TABLET, EXTENDED RELEASE ORAL at 08:20

## 2025-07-22 RX ADMIN — HEPARIN SODIUM 5000 UNITS: 5000 INJECTION INTRAVENOUS; SUBCUTANEOUS at 05:29

## 2025-07-22 RX ADMIN — LIDOCAINE 1 PATCH: 50 PATCH CUTANEOUS at 08:20

## 2025-07-22 RX ADMIN — METOPROLOL SUCCINATE 25 MG: 25 TABLET, EXTENDED RELEASE ORAL at 21:23

## 2025-07-22 RX ADMIN — HYDROMORPHONE HYDROCHLORIDE 0.2 MG: 0.2 INJECTION, SOLUTION INTRAMUSCULAR; INTRAVENOUS; SUBCUTANEOUS at 13:02

## 2025-07-22 RX ADMIN — HEPARIN SODIUM 5000 UNITS: 5000 INJECTION INTRAVENOUS; SUBCUTANEOUS at 21:23

## 2025-07-22 RX ADMIN — NIFEDIPINE 30 MG: 30 TABLET, EXTENDED RELEASE ORAL at 17:28

## 2025-07-22 RX ADMIN — Medication 2.5 MG: at 04:03

## 2025-07-22 RX ADMIN — FUROSEMIDE 20 MG: 20 TABLET ORAL at 08:20

## 2025-07-22 RX ADMIN — SEVELAMER HYDROCHLORIDE 1600 MG: 800 TABLET ORAL at 16:24

## 2025-07-22 RX ADMIN — SEVELAMER HYDROCHLORIDE 1600 MG: 800 TABLET ORAL at 08:19

## 2025-07-22 NOTE — ASSESSMENT & PLAN NOTE
BP elevated, pain could be contributing continue current medications.  Will monitor response to fluid removal  Home Rx: Nifedipine 30 mg daily, Metoprolol succ 25 mg BID?, furosemide 20 mg twice a day.

## 2025-07-22 NOTE — PLAN OF CARE
Patient presents for a 4 hour HD session on a 2K2.5Ca bath for a serum potassium of 5.1 mmol/L drawn today. Patient agreed he would attempt to stay on the duration of HD tx but may need to stand from time to time due to pain in feet. I explained to patient that I would not be comfortable with patient standing while on dialysis machine due to wanting to keep him safe by preventing a fall from hypotension. The patient insisted that he be able to stand otherwise I should not even start tx. I explained to patient that we will start HD tx and deal with the desire to stand when we get there. I explained that I would feel more comfortable ending HD tx than allowing him to stand while on HD tx. I also discussed repositioning, putting feet up in chair and possible medications to aid in pain management in order to complete entire HD tx.    Post-Dialysis RN Treatment Note    Blood Pressure:  Pre 157/70 mm/Hg  Post 140/69 mmHg   EDW:  89.5 kg    Weight:  Pre None Reported kg   Post None Reported kg   Mode of weight measurement: N/A - did not feel comfortable having patient stand pre or post HD    Volume Removed:  2350 ml    Treatment duration: 140 minutes    NS given:  No    Treatment shortened Yes, describe: per patient request due to left leg and foot pain; Patient needed to stand    Medications given during Rx: 0.2 mg dilaudid   Estimated Kt/V:  Not Applicable   Access type: Permacath/TDC   Access Issues: Yes, describe: unable to run 400 BFR due to high arterial pressures; Patient very restless in chair    Report called to primary nurse:   Yes     Problem: METABOLIC, FLUID AND ELECTROLYTES - ADULT  Goal: Electrolytes maintained within normal limits  Description: INTERVENTIONS:  - Monitor labs and assess patient for signs and symptoms of electrolyte imbalances  - Administer electrolyte replacement as ordered  - Monitor response to electrolyte replacements, including repeat lab results as appropriate  - Instruct patient on  fluid and nutrition as appropriate  Outcome: Progressing  Goal: Fluid balance maintained  Description: INTERVENTIONS:  - Monitor labs   - Monitor I/O and WT  - Instruct patient on fluid and nutrition as appropriate  - Assess for signs & symptoms of volume excess or deficit  Outcome: Progressing

## 2025-07-22 NOTE — PROGRESS NOTES
Progress Note - Hospitalist   Name: Naresh Carpio 65 y.o. male I MRN: [id number]  Unit/Bed#: [address] [place] [address] I Date of Admission: 7/20/2025   Date of Service: 7/22/2025 I Hospital Day: 1    Assessment & Plan  Encephalopathy  Toxic metabolic encephalopathy secondary to missed dialysis sessions  Status post urgent HD yesterday 7/20, plan for an additional suction with extra ultrafiltration tomorrow 7/22  Clinically his mental status appears to be back at baseline now after HD. Encephalopathy resolved.   ESRD (end stage renal disease) ([facility])  Lab Results   Component Value Date    EGFR 9 07/22/2025    EGFR 12 07/21/2025    EGFR 9 07/20/2025    CREATININE 5.76 (H) 07/22/2025    CREATININE 4.44 (H) 07/21/2025    CREATININE 5.61 (H) 07/20/2025   On dialysis HD   Appreciate nephrology recommendations   continue home binders    Neuropathy  On gabapentin 100 mg Monday/Wednesday/Friday prior to arrival  Patient stating it is not working, will increase to 100 mg daily  Acute respiratory failure with hypoxia ([facility])  Required 3 L on admission; now down to 2  Suspect this is secondary to volume overload in the setting of missed dialysis sessions  Wean and titrate oxygen per protocol.  Essential hypertension  Continue home metoprolol, nifedipine, Lasix  Currently and accelerated hypertension state.  Increasing nifedipine.  I appreciate further input from nephrology    PAD (peripheral artery disease) ([facility])  No acute concerns from the standpoint  Chronic venous stasis changes on the legs bilaterally  Continue home aspirin and statin  Paroxysmal atrial fibrillation ([facility])  Continue metoprolol and nifedipine for rate control  Not on any anticoagulants at home  Heparin subcu for DVT prophylaxis  Anemia in ESRD (end-stage renal disease)  ([facility])  Chronic anemia at baseline  Monitor  Chronic kidney disease-mineral and bone disorder  Continue home binders    VTE Pharmacologic Prophylaxis: VTE Score: 3 Moderate Risk (Score 3-4) -  Pharmacological DVT Prophylaxis Ordered: heparin.    Mobility:   Basic Mobility Inpatient Raw Score: 11  JH-HLM Goal: 4: Move to chair/commode  JH-HLM Achieved: 4: Move to chair/commode  JH-HLM Goal achieved. Continue to encourage appropriate mobility.    Patient Centered Rounds: I performed bedside rounds with nursing staff today.   Discussions with Specialists or Other Care Team Provider: nephrology     Education and Discussions with Family / Patient: Patient declined call to .     Current Length of Stay: 1 day(s)  Current Patient Status: Inpatient   Certification Statement: The patient will continue to require additional inpatient hospital stay due to ESRD on dialysis, volume overload, encephalopathy, non compliance with dialysis   Discharge Plan: Anticipate discharge in 24-48 hrs to rehab facility.    Code Status: Level 1 - Full Code    Subjective   Patient seen today at bedside he is very frustrated with dialysis however would like to continue receiving dialysis sessions.  He reports feeling tired and having pain in his back with changing behavior when receiving dialysis sessions as a result he is unable to complete sessions and have been getting partial dialysis sessions only.     Currently reports no active pain or nausea or vomiting or abdominal pain or diarrhea or constipation.      Objective :  Temp:  [98 °F (36.7 °C)-98.7 °F (37.1 °C)] 98 °F (36.7 °C)  HR:  [65-70] 68  BP: (153-191)/(67-79) 191/75  Resp:  [18-20] 18  SpO2:  [91 %-98 %] 98 %  O2 Device: None (Room air)  Nasal Cannula O2 Flow Rate (L/min):  [2 L/min] 2 L/min    Body mass index is 34.91 kg/m².     Input and Output Summary (last 24 hours):     Intake/Output Summary (Last 24 hours) at 7/22/2025 0834  Last data filed at 7/22/2025 0531  Gross per 24 hour   Intake --   Output 150 ml   Net -150 ml       Physical Exam  Vitals and nursing note reviewed.   Constitutional:       General: He is not in acute distress.     Appearance:  Normal appearance.   HENT:      Head: Normocephalic and atraumatic.      Mouth/Throat:      Mouth: Mucous membranes are moist.     Eyes:      Conjunctiva/sclera: Conjunctivae normal.      Pupils: Pupils are equal, round, and reactive to light.       Cardiovascular:      Rate and Rhythm: Normal rate and regular rhythm.      Pulses: Normal pulses.           Carotid pulses are 2+ on the right side and 2+ on the left side.       Radial pulses are 2+ on the right side and 2+ on the left side.      Heart sounds: S1 normal and S2 normal. Murmur heard.   Pulmonary:      Effort: No tachypnea, bradypnea or accessory muscle usage.      Breath sounds: Normal air entry. Decreased breath sounds present. No wheezing, rhonchi or rales.   Abdominal:      General: Bowel sounds are normal.      Tenderness: There is no abdominal tenderness.     Musculoskeletal:      Right lower leg: No edema.      Left lower leg: No edema.     Neurological:      Mental Status: He is alert. Mental status is at baseline.     Psychiatric:         Mood and Affect: Mood normal.         Behavior: Behavior normal.           Lines/Drains:  Lines/Drains/Airways       Active Status       Name Placement date Placement time Site Days    HD Permanent Double Catheter 08/30/23  --  Internal jugular  692                            Lab Results: I have reviewed the following results:   Results from last 7 days   Lab Units 07/21/25  0601 07/20/25  0354   WBC Thousand/uL 5.56 8.32   HEMOGLOBIN g/dL 10.1* 9.9*   HEMATOCRIT % 33.4* 31.9*   PLATELETS Thousands/uL 205 219   SEGS PCT %  --  76*   LYMPHO PCT %  --  10*   MONO PCT %  --  12   EOS PCT %  --  2     Results from last 7 days   Lab Units 07/22/25  0443 07/21/25  0601 07/20/25  0354   SODIUM mmol/L 133*   < > 133*   POTASSIUM mmol/L 5.1   < > 5.4*   CHLORIDE mmol/L 96   < > 97   CO2 mmol/L 28   < > 27   BUN mg/dL 33*   < > 37*   CREATININE mg/dL 5.76*   < > 5.61*   ANION GAP mmol/L 9   < > 9   CALCIUM mg/dL 8.7   < >  8.6   ALBUMIN g/dL  --   --  3.4*   TOTAL BILIRUBIN mg/dL  --   --  0.45   ALK PHOS U/L  --   --  89   ALT U/L  --   --  10   AST U/L  --   --  13   GLUCOSE RANDOM mg/dL 111   < > 104    < > = values in this interval not displayed.         Results from last 7 days   Lab Units 07/20/25  0348   POC GLUCOSE mg/dl 108               Recent Cultures (last 7 days):         Imaging Results Review: No pertinent imaging studies reviewed.  Other Study Results Review: No additional pertinent studies reviewed.    Last 24 Hours Medication List:     Current Facility-Administered Medications:     acetaminophen (TYLENOL) tablet 650 mg, Q6H PRN    allopurinol (ZYLOPRIM) tablet 100 mg, Daily    aspirin chewable tablet 81 mg, Daily    atorvastatin (LIPITOR) tablet 80 mg, Daily With Dinner    famotidine (PEPCID) tablet 20 mg, HS    furosemide (LASIX) tablet 20 mg, BID (diuretic)    gabapentin (NEURONTIN) capsule 100 mg, Daily    heparin (porcine) subcutaneous injection 5,000 Units, Q8H KEMAL    HYDROmorphone HCl (DILAUDID) injection 0.2 mg, Q4H PRN    ipratropium-albuterol (DUO-NEB) 0.5-2.5 mg/3 mL inhalation solution 3 mL, 4x Daily    lidocaine (LIDODERM) 5 % patch 1 patch, Daily    metoprolol succinate (TOPROL-XL) 24 hr tablet 25 mg, Q12H KEMAL    NIFEdipine (PROCARDIA XL) 24 hr tablet 30 mg, After Dinner    ondansetron (ZOFRAN) injection 4 mg, Q6H PRN    oxyCODONE (ROXICODONE) split tablet 2.5 mg, Q8H PRN    senna (SENOKOT) tablet 8.6 mg, Daily With Lunch    sevelamer (RENAGEL) tablet 1,600 mg, TID With Meals    Administrative Statements   Today, Patient Was Seen By: Nirav Bruce MD  I have spent a total time of 30 minutes in caring for this patient on the day of the visit/encounter including Diagnostic results, Prognosis, and Risks and benefits of tx options.    **Please Note: This note may have been constructed using a voice recognition system.**

## 2025-07-22 NOTE — ASSESSMENT & PLAN NOTE
Lab Results   Component Value Date    EGFR 9 07/22/2025    EGFR 12 07/21/2025    EGFR 9 07/20/2025    CREATININE 5.76 (H) 07/22/2025    CREATININE 4.44 (H) 07/21/2025    CREATININE 5.61 (H) 07/20/2025   On dialysis HD   Appreciate nephrology recommendations   continue home binders

## 2025-07-22 NOTE — CASE MANAGEMENT
Case Management Assessment & Discharge Planning Note    Patient name Naresh Carpio  Location [address] [place] [facility] [place] [facility]-* MRN [id number]  : 1960 Date 2025       Current Admission Date: 2025  Current Admission Diagnosis:Encephalopathy   Patient Active Problem List    Diagnosis Date Noted    Neuropathy 2025    Abdominal pain 07/10/2025    Diabetic wound to left plantar foot 07/10/2025    Retinal detachment 07/10/2025    HTN (hypertension) 2025    CHF (congestive heart failure) ([facility]) 2025    Anemia 2025    Agitation 2025    Abnormal urinalysis 2025    Metabolic encephalopathy 2025    Proliferative diabetic retinopathy of both eyes with macular edema associated with type 2 diabetes mellitus ([facility]) 2025    COVID-19 2025    Bilateral leg edema 2025    Ambulatory dysfunction 2025    Retinopathy 2025    At risk for acid-base imbalance 2025    Electrolyte imbalance risk 2025    Hallucinations 2025    Type 2 diabetes mellitus with foot ulcer, without long-term current use of insulin ([facility]) 2025    Insomnia 2025    Hyperphosphatemia 2025    Wound of skin 2025    At high risk for skin breakdown 2025    At risk for venous thromboembolism (VTE) 2025    Impaired mobility and activities of daily living 2025    Visual disturbance 2025    Pseudoaneurysm (HCC) 2025    Acute pain due to trauma 2025    ESRD on dialysis ([facility]) 2025    Wounds, multiple 2025    Traumatic retroperitoneal hemorrhage 2025    Secondary hyperparathyroidism ([facility]) 2025    At risk for electrolyte imbalance 2025    Multiple open wounds of foot 2025    Disorder of acid-base balance 2025    L2 vertebral fracture ([facility]) 2025    Non-compliance with treatment 2025    Generalized weakness 2025    Gout 2025    Closed fracture of proximal end  of left humerus with routine healing 02/10/2025    Left shoulder pain 02/08/2025    ESRD (end stage renal disease) on dialysis ([facility]) 01/16/2025    Primary hypertension 01/16/2025    Anemia in ESRD (end-stage renal disease)  ([facility]) 01/16/2025    Chronic kidney disease-mineral and bone disorder 01/16/2025    Renal lesion 12/19/2024    Chronic wound 11/20/2024    Acute respiratory failure with hypoxia ([facility]) 11/19/2024    Volume overload secondary to noncompliance with hemodialysis 11/19/2024    Pleural effusion 11/19/2024    Elevated troponin 11/19/2024    Paroxysmal atrial fibrillation ([facility])     ESRD (end stage renal disease) ([facility]) 10/25/2024    Hyponatremia 10/19/2024    Acute on chronic anemia 10/14/2024    Encephalopathy 10/08/2024    PAD (peripheral artery disease) ([facility]) 10/08/2024    Hyperkalemia 04/06/2024    Difficulty with speech 03/19/2024    History of amputation of hallux ([facility]) 03/18/2024    At risk for constipation 09/06/2023    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 mellitus, with necrosis of bone ([facility])     Acquired deformity of foot, right     Charcot's joint     Open wound of right foot 08/21/2023    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 mellitus, with bone involvement without evidence of necrosis ([facility])     Diabetic ulcer of left midfoot associated with type 2 diabetes mellitus, limited to breakdown of skin ([facility])     Diabetic polyneuropathy associated with type 2 diabetes mellitus ([facility])     History of amputation of lesser toe of left foot ([facility])     Onychomycosis     AMS (altered mental status) 08/20/2023    Traumatic retroperitoneal hematoma 08/19/2023    Osteoarthritis of left hip 07/27/2022    Severe Left Orchalgia 07/26/2022    Right shoulder pain 07/26/2022    SIRS (systemic inflammatory response syndrome) ([facility]) 07/26/2022    Left hip pain 07/06/2022    Hemodialysis status ([facility])     Hypervolemia     Chronic anemia 01/21/2022    History of prediabetes      Essential hypertension     History of TIA (transient ischemic attack)     T10 vertebral fracture (HCC)       LOS (days): 1  Geometric Mean LOS (GMLOS) (days): 4.4  Days to GMLOS:3.3     OBJECTIVE:  PATIENT READMITTED TO HOSPITAL  Risk of Unplanned Readmission Score: 70.74     Current admission status: Inpatient    Preferred Pharmacy:   [id number] - [place], [place] - [address]  [address]  [facility] [address]  Phone: [phone] Fax: [phone]    [id number] - [place], [place] - [address]  [address]  [facility] [address]  Phone: [phone] Fax: [phone]    Primary Care Provider: Jeffrey Jackson    Primary Insurance: MEDICARE  Secondary Insurance:     ASSESSMENT:  Active Health Care Proxies       Carpio Alicja Health Care Representative - Sister   Primary Phone: [phone] (Mobile)                 Readmission Root Cause  30 Day Readmission: Yes  During your hospital stay, did someone (provider, nurse, ) explain your care to you in a way you could understand?: Yes  Did you feel medically stable to leave the hospital?: Yes  Were you able to pay for your medication at the pharmacy?: Yes  Did you have reliable transportation to take you to your appointments?: Yes  During previous admission, was a post-acute recommendation made?: Yes  What post-acute resources were offered?: Other (Return to SNF placement)  Patient was readmitted due to: AMS  Action Plan: Medical Management    Patient Information  Admitted from:: Facility (Currently living at SNF- [facility])  Mental Status: Alert  During Assessment patient was accompanied by: Not accompanied during assessment  Assessment information provided by:: Patient  Primary Caregiver: Other (Comment) (Facility staff at [facility])  Caregiver's Name:: Staff at [facility]  Caregiver's Relationship to Patient:: Facility  Staff  Caregiver's Telephone Number:: [phone]  Support Systems: Family members (Sister Alicja)  What city do you live in?: [place]  Home entry access options. Select all that apply.: No steps to enter home  Type of Current Residence: Facility ([facility])  Upon entering residence, is there a bedroom on the main floor (no further steps)?: Yes  Upon entering residence, is there a bathroom on the main floor (no further steps)?: Yes  Living Arrangements: Lives in Facility    Activities of Daily Living Prior to Admission  Completes ADLs independently?: No  Level of ADL dependence: Assistance  Ambulates independently?: Yes  Does patient use assisted devices?: Yes  Assisted Devices (DME) used: Walker, Other (Comment) (TLSO brace)  Does patient currently own DME?: Yes  What DME does the patient currently own?: Walker, Other (Comment) (TLSO brace)  Does the patient have a history of Short-Term Rehab?: Yes ( at [facility], [place], [facility])  Does patient have a history of HHC?: No  Does patient currently have HHC?: No    Patient Information Continued  Income Source: SSI/SSD  Does patient have prescription coverage?: Yes  Can the patient afford their medications and any related supplies (such as glucometers or test strips)?: Yes  Does patient receive dialysis treatments?: Yes ([facility]; TTS 1000)      Means of Transportation  Means of Transport to Appts:: Other (Comment) (SNF is currently transporting patient to HD treatments. Prior to SNF placement, family was providing transportation.)      Social Determinants of Health (SDOH)      Flowsheet Row Most Recent Value   Housing Stability    In the last 12 months, was there a time when you were not able to pay the mortgage or rent on time? N   In the past 12 months, how many times have you moved where you were living? 1   At any time in the past 12 months, were you homeless or living in a shelter (including now)? N   Transportation Needs     In the past 12 months, has lack of transportation kept you from medical appointments or from getting medications? no   In the past 12 months, has lack of transportation kept you from meetings, work, or from getting things needed for daily living? No   Food Insecurity    Within the past 12 months, you worried that your food would run out before you got the money to buy more. Never true   Within the past 12 months, the food you bought just didn't last and you didn't have money to get more. Never true   Utilities    In the past 12 months has the electric, gas, oil, or water company threatened to shut off services in your home? No            DISCHARGE DETAILS:    Discharge planning discussed with:: Patient  Freedom of Choice: Yes    Other Referral/Resources/Interventions Provided:  Referral Comments: CM reserved [facility] in [facility].    Treatment Team Recommendation: Facility Return, SNF  Expected Discharge Disposition: Facility Return    CM met with patient at bedside to introduce self/role, confirm assessment information, and confirm plan is to return to [facility] at discharge. Patient verbalized his agreement with returning to the facility. Patient stated he can ambulate with a walker and would like to be able to get up from chair and walk to the bathroom as needed. CM made provider/nursing aware and PT/OT orders were placed. PT/OT evaluations pending.     Patient had no other discharge questions or concerns at this time. CM will continue to follow.

## 2025-07-22 NOTE — ASSESSMENT & PLAN NOTE
-Continue management per primary team on home aspirin and statins   I called the patient to remind her of the [facility] coming to her home this morning  I asked if she was out of bed and ready so she is not tempted to fall asleep and miss the [facility] Phlegm and she stated she was  I will follow up with her later today to remind her of her appointments scheduled for tomorrow

## 2025-07-22 NOTE — ASSESSMENT & PLAN NOTE
Hemoglobin currently at target range at 10.1 g/dL, continue to monitor.  He is on Mircera in the outpatient setting.

## 2025-07-22 NOTE — PROGRESS NOTES
Progress Note - Nephrology   Name: Naresh Carpio 65 y.o. male I MRN: [id number]  Unit/Bed#: 3 [place] [address] I Date of Admission: 7/20/2025   Date of Service: 7/22/2025 I Hospital Day: 1    Assessment & Plan  ESRD (end stage renal disease) ([facility])  [facility] TTS  Access: R IJ permcath.   EDW 89.5 kg.   Status post urgent HD on 7/20 for fluid overload and hyperkalemia  Patient appears slightly fluid overloaded.  Plan for hemodialysis treatment today with ultrafiltration.  Stressed on completing full treatment and he verbalized understanding.  Will use 2K bath    Encephalopathy  Mental status has improved and is back to baseline, oriented x 3 currently.  Possibility of uremic encephalopathy from missed couple of dialysis treatment cannot be ruled out.    Acute respiratory failure with hypoxia ([facility])  Chest x-ray with vascular congestion.  Status post urgent HD on 7/20 and volume status improving currently on oxygen nasal cannula.   Hemodialysis today with ultrafiltration    Essential hypertension  BP elevated, pain could be contributing continue current medications.  Will monitor response to fluid removal  Home Rx: Nifedipine 30 mg daily, Metoprolol succ 25 mg BID?, furosemide 20 mg twice a day.    Anemia in ESRD (end-stage renal disease)  ([facility])  Hemoglobin currently at target range at 10.1 g/dL, continue to monitor.  He is on Mircera in the outpatient setting.    Chronic kidney disease-mineral and bone disorder  Home Rx: Sevelamer 1600 mg TID.   Check phos.  Last phosphorus level was 4.2 on 7/12.   Continue sevelamer.  Recommend renal diet    PAD (peripheral artery disease) ([facility])  -Continue management per primary team on home aspirin and statins  Paroxysmal atrial fibrillation ([facility])  -On metoprolol and nifedipine.  Management per primary team not on anticoagulants at home  Neuropathy  -Continue management per primary team.  Was restarted on gabapentin 100 mg daily, monitor response, patient mentions gabapentin  does not work.  Would recommend outpatient pain management  Hyponatremia  -Sodium slightly low at 133 meq/L, recommend fluid restriction    I have reviewed the nephrology recommendations including plan for hemodialysis treatment today, with primary team, and we are in agreement with renal plan including the information outlined above.     Subjective   Complaining of pain in both feet.  No chest pain or shortness of breath, nausea or vomiting    Objective :  Temp:  [98 °F (36.7 °C)-98.7 °F (37.1 °C)] 98 °F (36.7 °C)  HR:  [65-70] 68  BP: (153-191)/(67-79) 191/75  Resp:  [18-20] 18  SpO2:  [91 %-98 %] 98 %  O2 Device: None (Room air)  Nasal Cannula O2 Flow Rate (L/min):  [2 L/min] 2 L/min    Current Weight: Weight - Scale: 98.1 kg (216 lb 4.3 oz)  First Weight: Weight - Scale: 98.1 kg (216 lb 4.3 oz)  I/O         07/20 0701 07/21 0700 07/21 0701 07/22 0700 07/22 0701 07/23 0700    P.O. 250      I.V. (mL/kg) 500 (5.1)      Total Intake(mL/kg) 750 (7.6)      Urine (mL/kg/hr) 250 (0.1) 150 (0.1)     Other 2500      Total Output 2750 150     Net -2000 -150                  Physical Exam   General:  Ill looking, awake.  Eyes: Conjunctivae pink,  Sclera anicteric  ENT: lips and mucous membranes moist  Neck: supple   Chest: Clear to Auscultation both lungs,  no crackles, ronchus or wheezing.  CVS: S1 & S2 present, normal rate, regular rhythm, no murmur.  Abdomen: soft, non-tender, non-distended, Bowel sounds normoactive  Extremities: 1+ edema both legs, dressing both feet  Skin: no rash  Neuro: awake, alert, oriented  Psych: Mood and affect appropriate    Medications:  Current Medications[1]      Lab Results: I have reviewed the following results:  Results from last 7 days   Lab Units 07/22/25  0443 07/21/25  0601 07/20/25  0354   WBC Thousand/uL  --  5.56 8.32   HEMOGLOBIN g/dL  --  10.1* 9.9*   HEMATOCRIT %  --  33.4* 31.9*   PLATELETS Thousands/uL  --  205 219   POTASSIUM mmol/L 5.1 4.3 5.4*   CHLORIDE mmol/L 96 97 97  "  CO2 mmol/L 28 29 27   BUN mg/dL 33* 23 37*   CREATININE mg/dL 5.76* 4.44* 5.61*   CALCIUM mg/dL 8.7 8.7 8.6   MAGNESIUM mg/dL  --  2.0  --    ALBUMIN g/dL  --   --  3.4*       Administrative Statements     Portions of the record may have been created with voice recognition software. Occasional wrong word or \"sound a like\" substitutions may have occurred due to the inherent limitations of voice recognition software. Read the chart carefully and recognize, using context, where substitutions have occurred.If you have any questions, please contact the dictating provider.       [1]   Current Facility-Administered Medications:     acetaminophen (TYLENOL) tablet 650 mg, 650 mg, Oral, Q6H PRN, Gennaro Zelaya, DO, 650 mg at 07/20/25 2105    allopurinol (ZYLOPRIM) tablet 100 mg, 100 mg, Oral, Daily, Gennaro Penainic, DO, 100 mg at 07/22/25 0820    aspirin chewable tablet 81 mg, 81 mg, Oral, Daily, Gennaro Penainic, DO, 81 mg at 07/22/25 0819    atorvastatin (LIPITOR) tablet 80 mg, 80 mg, Oral, Daily With Dinner, Gennaro Penainic, DO, 80 mg at 07/21/25 1630    famotidine (PEPCID) tablet 20 mg, 20 mg, Oral, HS, Gennaro Penainic, DO, 20 mg at 07/21/25 2158    furosemide (LASIX) tablet 20 mg, 20 mg, Oral, BID (diuretic), Gennaro Penainic, DO, 20 mg at 07/22/25 0820    gabapentin (NEURONTIN) capsule 100 mg, 100 mg, Oral, Daily, Gennaro Penainic, DO, 100 mg at 07/22/25 0819    heparin (porcine) subcutaneous injection 5,000 Units, 5,000 Units, Subcutaneous, Q8H KEMAL, Gennaro Penainic, DO, 5,000 Units at 07/22/25 0529    HYDROmorphone HCl (DILAUDID) injection 0.2 mg, 0.2 mg, Intravenous, Q4H PRN, GRANT Rich, 0.2 mg at 07/21/25 2158    ipratropium-albuterol (DUO-NEB) 0.5-2.5 mg/3 mL inhalation solution 3 mL, 3 mL, Nebulization, 4x Daily, Gennaro Zelaya, DO, 3 mL at 07/21/25 1547    lidocaine (LIDODERM) 5 % patch 1 patch, 1 patch, Topical, Daily, GRANT Rich, 1 " patch at 07/22/25 0820    metoprolol succinate (TOPROL-XL) 24 hr tablet 25 mg, 25 mg, Oral, Q12H KEMAL, Gennaro Zelaya DO, 25 mg at 07/22/25 0820    NIFEdipine (PROCARDIA XL) 24 hr tablet 30 mg, 30 mg, Oral, After Dinner, Gennaro Zelaya DO, 30 mg at 07/21/25 1712    NIFEdipine (PROCARDIA XL) 24 hr tablet 30 mg, 30 mg, Oral, Once, Nirav Bruce MD    ondansetron (ZOFRAN) injection 4 mg, 4 mg, Intravenous, Q6H PRN, Gennaro Zelaya DO, 4 mg at 07/21/25 1202    oxyCODONE (ROXICODONE) split tablet 2.5 mg, 2.5 mg, Oral, Q8H PRN, Gennaro Zelaya DO, 2.5 mg at 07/22/25 0403    senna (SENOKOT) tablet 8.6 mg, 8.6 mg, Oral, Daily With Lunch, Gennaro Zelaya DO, 8.6 mg at 07/21/25 1203    sevelamer (RENAGEL) tablet 1,600 mg, 1,600 mg, Oral, TID With Meals, Gennaro Zelaya DO, 1,600 mg at 07/22/25 0819

## 2025-07-22 NOTE — PLAN OF CARE
Problem: Prexisting or High Potential for Compromised Skin Integrity  Goal: Skin integrity is maintained or improved  Description: INTERVENTIONS:  - Identify patients at risk for skin breakdown  - Assess and monitor skin integrity including under and around medical devices   - Assess and monitor nutrition and hydration status  - Monitor labs  - Assess for incontinence   - Turn and reposition patient  - Assist with mobility/ambulation  - Relieve pressure over chente prominences   - Avoid friction and shearing  - Provide appropriate hygiene as needed including keeping skin clean and dry  - Evaluate need for skin moisturizer/barrier cream  - Collaborate with interdisciplinary team  - Patient/family teaching  - Consider wound care consult    Assess:  - Review Praful scale daily  - Clean and moisturize skin every day  - Inspect skin when repositioning, toileting, and assisting with ADLS  - Assess extremities for adequate circulation and sensation     Bed Management:  - Have minimal linens on bed & keep smooth, unwrinkled  - Change linens as needed when moist or perspiring  - Avoid sitting or lying in one position for more than 2 hours while in bed?Keep HOB at 45 degrees   - Toileting:  - Offer bedside commode  - Assess for incontinence every hour    Activity:  - Mobilize patient 12 times a day  - Encourage activity and walks on unit  - Encourage or provide ROM exercises   - Turn and reposition patient every 2 Hours  - Use appropriate equipment to lift or move patient in bed  - Instruct/ Assist with weight shifting every hour when out of bed in chair  - Consider limitation of chair time 2 hour intervals    Skin Care:  - Avoid use of baby powder, tape, friction and shearing, hot water or constrictive clothing  - Relieve pressure over bony prominences using mepalex   - Do not massage red bony areas  Outcome: Progressing     Problem: METABOLIC, FLUID AND ELECTROLYTES - ADULT  Goal: Electrolytes maintained within normal  limits  Description: INTERVENTIONS:  - Monitor labs and assess patient for signs and symptoms of electrolyte imbalances  - Administer electrolyte replacement as ordered  - Monitor response to electrolyte replacements, including repeat lab results as appropriate  - Instruct patient on fluid and nutrition as appropriate  Outcome: Progressing  Goal: Fluid balance maintained  Description: INTERVENTIONS:  - Monitor labs   - Monitor I/O and WT  - Instruct patient on fluid and nutrition as appropriate  - Assess for signs & symptoms of volume excess or deficit  Outcome: Progressing  Goal: Glucose maintained within target range  Description: INTERVENTIONS:  - Monitor Blood Glucose as ordered  - Assess for signs and symptoms of hyperglycemia and hypoglycemia  - Administer ordered medications to maintain glucose within target range  - Assess nutritional intake and initiate nutrition service referral as needed  Outcome: Progressing     Problem: HEMATOLOGIC - ADULT  Goal: Maintains hematologic stability  Description: INTERVENTIONS  - Assess for signs and symptoms of bleeding or hemorrhage  - Monitor labs  - Administer supportive blood products/factors as ordered and appropriate  Outcome: Progressing     Problem: PAIN - ADULT  Goal: Verbalizes/displays adequate comfort level or baseline comfort level  Description: Interventions:  - Encourage patient to monitor pain and request assistance  - Assess pain using appropriate pain scale  - Administer analgesics as ordered based on type and severity of pain and evaluate response  - Implement non-pharmacological measures as appropriate and evaluate response  - Consider cultural and social influences on pain and pain management  - Notify physician/advanced practitioner if interventions unsuccessful or patient reports new pain  - Educate patient/family on pain management process including their role and importance of  reporting pain   - Provide non-pharmacologic/complimentary pain relief  interventions  Outcome: Progressing     Problem: INFECTION - ADULT  Goal: Absence or prevention of progression during hospitalization  Description: INTERVENTIONS:  - Assess and monitor for signs and symptoms of infection  - Monitor lab/diagnostic results  - Monitor all insertion sites, i.e. indwelling lines, tubes, and drains  - Monitor endotracheal if appropriate and nasal secretions for changes in amount and color  - Orange appropriate cooling/warming therapies per order  - Administer medications as ordered  - Instruct and encourage patient and family to use good hand hygiene technique  - Identify and instruct in appropriate isolation precautions for identified infection/condition  Outcome: Progressing  Goal: Absence of fever/infection during neutropenic period  Description: INTERVENTIONS:  - Monitor WBC  - Perform strict hand hygiene  - Limit to healthy visitors only  - No plants, dried, fresh or silk flowers with quiñonez in patient room  Outcome: Progressing     Problem: SAFETY ADULT  Goal: Patient will remain free of falls  Description: INTERVENTIONS:  - Educate patient/family on patient safety including physical limitations  - Instruct patient to call for assistance with activity   - Consider consulting OT/PT to assist with strengthening/mobility based on AM PAC & JH-HLM score  - Consult OT/PT to assist with strengthening/mobility   - Keep Call bell within reach  - Keep bed low and locked with side rails adjusted as appropriate  - Keep care items and personal belongings within reach  - Initiate and maintain comfort rounds  - Make Fall Risk Sign visible to staff  - Offer Toileting every 2 Hours, in advance of need  - Initiate/Maintain bed/chair alarm  - Obtain necessary fall risk management equipment: bracelet/socks   - Apply yellow socks and bracelet for high fall risk patients  - Consider moving patient to room near nurses station  Outcome: Progressing  Goal: Maintain or return to baseline ADL  function  Description: INTERVENTIONS:  -  Assess patient's ability to carry out ADLs; assess patient's baseline for ADL function and identify physical deficits which impact ability to perform ADLs (bathing, care of mouth/teeth, toileting, grooming, dressing, etc.)  - Assess/evaluate cause of self-care deficits   - Assess range of motion  - Assess patient's mobility; develop plan if impaired  - Assess patient's need for assistive devices and provide as appropriate  - Encourage maximum independence but intervene and supervise when necessary  - Involve family in performance of ADLs  - Assess for home care needs following discharge   - Consider OT consult to assist with ADL evaluation and planning for discharge  - Provide patient education as appropriate  - Monitor functional capacity and physical performance, use of AM PAC & JH-HLM   - Monitor gait, balance and fatigue with ambulation    Outcome: Progressing  Goal: Maintains/Returns to pre admission functional level  Description: INTERVENTIONS:  - Perform AM-PAC 6 Click Basic Mobility/ Daily Activity assessment daily.  - Set and communicate daily mobility goal to care team and patient/family/caregiver.   - Collaborate with rehabilitation services on mobility goals if consulted  - Perform Range of Motion 12 times a day.  - Reposition patient every 2 hours.  - Dangle patient 3 times a day  - Stand patient 3 times a day  - Ambulate patient 3 times a day  - Out of bed to chair 3 times a day   - Out of bed for meals 3 times a day  - Out of bed for toileting  - Record patient progress and toleration of activity level   Outcome: Progressing     Problem: DISCHARGE PLANNING  Goal: Discharge to home or other facility with appropriate resources  Description: INTERVENTIONS:  - Identify barriers to discharge w/patient and caregiver  - Arrange for needed discharge resources and transportation as appropriate  - Identify discharge learning needs (meds, wound care, etc.)  - Arrange for  interpretive services to assist at discharge as needed  - Refer to Case Management Department for coordinating discharge planning if the patient needs post-hospital services based on physician/advanced practitioner order or complex needs related to functional status, cognitive ability, or social support system  Outcome: Progressing     Problem: Knowledge Deficit  Goal: Patient/family/caregiver demonstrates understanding of disease process, treatment plan, medications, and discharge instructions  Description: Complete learning assessment and assess knowledge base.  Interventions:  - Provide teaching at level of understanding  - Provide teaching via preferred learning methods  Outcome: Progressing     Problem: RESPIRATORY - ADULT  Goal: Achieves optimal ventilation and oxygenation  Description: INTERVENTIONS:  - Assess for changes in respiratory status  - Assess for changes in mentation and behavior  - Position to facilitate oxygenation and minimize respiratory effort  - Oxygen administered by appropriate delivery if ordered  - Initiate smoking cessation education as indicated  - Encourage broncho-pulmonary hygiene including cough, deep breathe, Incentive Spirometry  - Assess the need for suctioning and aspirate as needed  - Assess and instruct to report SOB or any respiratory difficulty  - Respiratory Therapy support as indicated  Outcome: Progressing     Problem: Nutrition/Hydration-ADULT  Goal: Nutrient/Hydration intake appropriate for improving, restoring or maintaining nutritional needs  Description: Monitor and assess patient's nutrition/hydration status for malnutrition. Collaborate with interdisciplinary team and initiate plan and interventions as ordered.  Monitor patient's weight and dietary intake as ordered or per policy. Utilize nutrition screening tool and intervene as necessary. Determine patient's food preferences and provide high-protein, high-caloric foods as appropriate.     INTERVENTIONS:  -  Monitor oral intake, urinary output, labs, and treatment plans  - Assess nutrition and hydration status and recommend course of action  - Evaluate amount of meals eaten  - Assist patient with eating if necessary   - Allow adequate time for meals  - Recommend/ encourage appropriate diets, oral nutritional supplements, and vitamin/mineral supplements  - Order, calculate, and assess calorie counts as needed  - Recommend, monitor, and adjust tube feedings and TPN/PPN based on assessed needs  - Assess need for intravenous fluids  - Provide specific nutrition/hydration education as appropriate  - Include patient/family/caregiver in decisions related to nutrition  Outcome: Progressing

## 2025-07-22 NOTE — ASSESSMENT & PLAN NOTE
[facility] TTS  Access: R IJ permcath.   EDW 89.5 kg.   Status post urgent HD on 7/20 for fluid overload and hyperkalemia  Patient appears slightly fluid overloaded.  Plan for hemodialysis treatment today with ultrafiltration.  Stressed on completing full treatment and he verbalized understanding.  Will use 2K bath

## 2025-07-22 NOTE — ASSESSMENT & PLAN NOTE
Continue home metoprolol, nifedipine, Lasix  Currently and accelerated hypertension state.  Increasing nifedipine.  I appreciate further input from nephrology

## 2025-07-22 NOTE — ASSESSMENT & PLAN NOTE
Toxic metabolic encephalopathy secondary to missed dialysis sessions  Status post urgent HD yesterday 7/20, plan for an additional suction with extra ultrafiltration tomorrow 7/22  Clinically his mental status appears to be back at baseline now after HD. Encephalopathy resolved.

## 2025-07-22 NOTE — PLAN OF CARE
Problem: PHYSICAL THERAPY ADULT  Goal: Performs mobility at highest level of function for planned discharge setting.  See evaluation for individualized goals.  Description: Treatment/Interventions: LE strengthening/ROM, Functional transfer training, Therapeutic exercise, Endurance training, Gait training, Spoke to nursing          See flowsheet documentation for full assessment, interventions and recommendations.  Note: Prognosis: Good  Problem List: Decreased strength, Decreased endurance, Impaired balance, Decreased mobility, Pain, Decreased skin integrity  Assessment: Patient seen for Physical Therapy evaluation. Patient admitted with Encephalopathy.  Comorbidities affecting patient's physical performance include: Afib, amputation, anemia, arthritis, cardiac disease, CKD, diabetes, ESRD on hemodialysis, falls, hypertension, neuropathy, osteoarthritis, and TIA.  Personal factors affecting patient at time of initial evaluation include: ambulating with assistive device, inability to navigate community distances, inability to navigate level surfaces without external assistance, limited home support, positive fall history, and inability to perform IADLS . Prior to admission, patient was requiring assist for functional mobility with walker, requiring assist for ADLS, requiring assist for IADLS, and in short term rehab.  Please find objective findings from Physical Therapy assessment regarding body systems outlined above with impairments and limitations including weakness, impaired balance, decreased endurance, gait deviations, pain, decreased activity tolerance, decreased functional mobility tolerance, decreased safety awareness, impaired judgement, fall risk, decreased skin integrity, and decreased cognition. Patient's clinical presentation is currently unstable/unpredictable as seen in patient's presentation of changing level of pain, varying levels of cognitive performance, increased fall risk, new onset of  impairment of functional mobility, decreased endurance, and new onset of weakness. Pt would benefit from continued Physical Therapy treatment to address deficits as defined above and maximize level of functional mobility. As demonstrated by objective findings, the assigned level of complexity for this evaluation is high.The patient's AM-[facility] Basic Mobility Inpatient Short Form Raw Score is 17. A Raw score of greater than 16 suggests the patient may benefit from discharge to home, however pt would require increased level of assist to function safely at home, + falls risk and would benefit from continued skilled PT services. Please also refer to the recommendation of the Physical Therapist for safe discharge planning.        Rehab Resource Intensity Level, PT: III (Minimum Resource Intensity)    See flowsheet documentation for full assessment.

## 2025-07-22 NOTE — PHYSICAL THERAPY NOTE
PHYSICAL THERAPY EVALUATION/TREATMENT       07/22/25 1503   PT Last Visit   PT Visit Date 07/22/25   Note Type   Note type Evaluation   Pain Assessment   Pain Assessment Tool 0-10   Pain Score No Pain   Patient's Stated Pain Goal No pain   Multiple Pain Sites No   Restrictions/Precautions   Weight Bearing Precautions Per Order No   Other Precautions Bed Alarm;Chair Alarm;Fall Risk;Pain;Contact/isolation   Home Living   Type of Home SNF  (Pt reports he has been at [facility] for approx 2-3 months)   Home Layout One level   Prior Function   Level of Elsie Independent with functional mobility;Needs assistance with ADLs;Needs assistance with IADLS   Lives With Facility staff   Receives Help From Other (Comment);Family  (staff)   IADLs Family/Friend/Other provides transportation;Family/Friend/Other provides meals   Falls in the last 6 months 1 to 4  (approx 4 falls)   General   Additional Pertinent History Pt is a 65 year-old male who was admitted to the hospital on 7/20/25 due to metabolic encephalopathy   Family/Caregiver Present No   Cognition   Arousal/Participation Cooperative   Orientation Level Oriented to person;Oriented to place;Oriented to time   Following Commands Follows multistep commands with increased time or repetition   Subjective   Subjective Pt agreeable to PT session this afternoon   RLE Assessment   RLE Assessment WFL  (Grossly 4/5)   LLE Assessment   LLE Assessment WFL  (Grossly 4/5)   Bed Mobility   Supine to Sit Unable to assess   Sit to Supine Unable to assess   Additional Comments Received sitting in bedside chair   Transfers   Sit to Stand 5  Supervision   Additional items Assist x 1;Verbal cues;Increased time required;Armrests   Stand to Sit 5  Supervision   Additional items Assist x 1;Verbal cues;Increased time required;Armrests   Additional Comments (S)  Pt did not have LSO brace donned upon entering room - pt reports brace is over at [facility]. Reviewed spinal precautions  with pt who verbalized understanding - able to ambulate/perform transfers safely while maintaining spinal precautions.   Ambulation/Elevation   Gait pattern Foward flexed;Short stride;Step through pattern  (decreased gait speed)   Gait Assistance 4  Minimal assist  (progressing to close supervision)   Additional items Assist x 1;Verbal cues   Assistive Device Rolling walker   Distance 120 feet with change of direction   Stair Management Assistance Not tested   Balance   Static Sitting Good   Dynamic Sitting Fair +   Static Standing Fair   Dynamic Standing Fair   Ambulatory Fair -  (RW)   Activity Tolerance   Activity Tolerance Patient tolerated treatment well;Patient limited by fatigue   Nurse Made Aware yes RN Esther   Assessment   Prognosis Good   Problem List Decreased strength;Decreased endurance;Impaired balance;Decreased mobility;Pain;Decreased skin integrity   Assessment Patient seen for Physical Therapy evaluation. Patient admitted with Encephalopathy.  Comorbidities affecting patient's physical performance include: Afib, amputation, anemia, arthritis, cardiac disease, CKD, diabetes, ESRD on hemodialysis, falls, hypertension, neuropathy, osteoarthritis, and TIA.  Personal factors affecting patient at time of initial evaluation include: ambulating with assistive device, inability to navigate community distances, inability to navigate level surfaces without external assistance, limited home support, positive fall history, and inability to perform IADLS . Prior to admission, patient was requiring assist for functional mobility with walker, requiring assist for ADLS, requiring assist for IADLS, and in short term rehab.  Please find objective findings from Physical Therapy assessment regarding body systems outlined above with impairments and limitations including weakness, impaired balance, decreased endurance, gait deviations, pain, decreased activity tolerance, decreased functional mobility tolerance, decreased  safety awareness, impaired judgement, fall risk, decreased skin integrity, and decreased cognition. Patient's clinical presentation is currently unstable/unpredictable as seen in patient's presentation of changing level of pain, varying levels of cognitive performance, increased fall risk, new onset of impairment of functional mobility, decreased endurance, and new onset of weakness. Pt would benefit from continued Physical Therapy treatment to address deficits as defined above and maximize level of functional mobility. As demonstrated by objective findings, the assigned level of complexity for this evaluation is high.The patient's AM-[facility] Basic Mobility Inpatient Short Form Raw Score is 17. A Raw score of greater than 16 suggests the patient may benefit from discharge to home, however pt would require increased level of assist to function safely at home, + falls risk and would benefit from continued skilled PT services. Please also refer to the recommendation of the Physical Therapist for safe discharge planning.   Goals   Patient Goals to get eye surgery and improve ability to walk   STG Expiration Date 07/29/25   Short Term Goal #1 Pt will perform bed mobility/transfers IND ; Standing balance will improve to fair+ to decrease risk of falls ; BLE strength will improve by 1/2 grade to improve tolerance to functional mobility ; Pt will ambulate x 150 feet Mod I with RW ; pt will negotiate x 1 step/curb with Min A ; AMPAC score will improve >18/24 to demonstrate improved functional independence   LTG Expiration Date 08/05/25   Long Term Goal #1 Standing balance will improve to goodto decrease risk of falls ; BLE strength will improve by 1 grade to improve tolerance to functional mobility ; Pt will ambulate x 250 feet Mod I with RW ; pt will negotiate x 1 step/curb with supervision ; AMPAC score will improve >20/24 to demonstrate improved functional independence   Plan   Treatment/Interventions LE  strengthening/ROM;Functional transfer training;Therapeutic exercise;Endurance training;Gait training;Spoke to nursing   PT Frequency 3-5x/wk   Discharge Recommendation   Rehab Resource Intensity Level, PT III (Minimum Resource Intensity)   Additional Comments Pt admit from [facility] facility - pt reports he is there until he can get eye surgery due to difficulty with vision/unable to care for himself at home. Pt with improved functional mobility compared to prior admission assessed at level 3. Pt would require increased level of assist to return home. If appropriate level of assist is not available may need to consider alternative d/c options. Will benefit from continued skilled PT services   AM-PAC Basic Mobility Inpatient   Turning in Flat Bed Without Bedrails 3   Lying on Back to Sitting on Edge of Flat Bed Without Bedrails 3   Moving Bed to Chair 3   Standing Up From Chair Using Arms 3   Walk in Room 3   Climb 3-5 Stairs With Railing 2   Basic Mobility Inpatient Raw Score 17   Basic Mobility Standardized Score 39.67   [facility] Highest Level Of Mobility   -[facility] Goal 5: Stand one or more mins   -[facility] Achieved 7: Walk 25 feet or more   Additional Treatment Session   Start Time 1453   End Time 1503   Treatment Assessment S: Pt agreeable to PT session this afternoon O/A: Pt able to perform exercise as mentioned below with intermittent verbal cues/demo to improve form/ROM. Pt with no reports of pain thrhoughout session P: pt will benefit from skilled PT to address deficits to maximize IND for safe d/c   Exercises   Neuro re-ed Able to perform seated exercise including heel/toe raises, LAQ, seated hip marches, hip add squeezes x 10 reps bilat   End of Consult   Patient Position at End of Consult Bedside chair;All needs within reach;Bed/Chair alarm activated   Licensure   [place] License Number  Lizeth Gill [id number]

## 2025-07-22 NOTE — ASSESSMENT & PLAN NOTE
-Continue management per primary team.  Was restarted on gabapentin 100 mg daily, monitor response, patient mentions gabapentin does not work.  Would recommend outpatient pain management

## 2025-07-22 NOTE — DISCHARGE INSTR - OTHER ORDERS
Wound Care Plan:  1-Cleanse wounds to right 2nd & 3rd toes, left 1st & 2nd toes with VASHE or NSS soaked gauze and pat dry. Apply Betadine to wounds, gauze, rolled gauze and tape. Change 3x/week and as needed for soilage/dislodgement.  2-Cleanse wound to left lateral plantar foot with VASHE or NSS soaked gauze and pat dry. Cut strip of Melgisorb Ag or equivalent and pack wound lightly. Cover with ABD, rolled gauze and tape. Change 3x/week and as needed for soilage/dislodgement.  3-Cleanse wound to right lateral lower leg, left medial lower leg, left anterior lower leg (blister) with VASHE or NSS soaked gauze and pat dry. Apply single layer of Dermagran to wounds, ABD, rolled gauze and tape. Change 3x/week and as needed for soilage/dislodgement.  4-Apply Prevent barrier cream or equivalent to bilateral sacrobuttocks 2x/day and as needed and to heels 2x/day and as needed or with dressing changes.  5-Heel lift boots to be worn to bilateral heels at all times in bed and after transfer to reclining chair if able. If patient unable to tolerate, must float heels on 2 pillows to offload pressure. Heels must not come in contact with mattress or pillows. Must remove boots before attempting to stand or ambulate patient.  6-Use pressure redistribution cushion in chair when out of bed if able. Avoid prolonged sitting.  7-Moisturize skin daily with skin nourishing cream.  8-Turn/reposition every 2 hours in bed and every 15-20 minutes when out of bed to chair for pressure re-distribution on skin.   9-You will receive a call from Central Scheduling to follow up at [facility].

## 2025-07-22 NOTE — ASSESSMENT & PLAN NOTE
Chest x-ray with vascular congestion.  Status post urgent HD on 7/20 and volume status improving currently on oxygen nasal cannula.   Hemodialysis today with ultrafiltration

## 2025-07-22 NOTE — ASSESSMENT & PLAN NOTE
Required 3 L on admission; now down to 2  Suspect this is secondary to volume overload in the setting of missed dialysis sessions  Wean and titrate oxygen per protocol.

## 2025-07-22 NOTE — PHYSICAL THERAPY NOTE
Physical Therapy Cancellation Note       07/22/25 1216   Note Type   Note type Cancelled Session   Cancel Reasons Patient off floor/hemodialysis   Additional Comments Attempted to see pt this afternoon for PT evaluation. Pt currently getting bedside dialysis. Will follow-up as schedule allows.   Licensure   [place] License Number  Lizeth Gill [id number]

## 2025-07-22 NOTE — ASSESSMENT & PLAN NOTE
Mental status has improved and is back to baseline, oriented x 3 currently.  Possibility of uremic encephalopathy from missed couple of dialysis treatment cannot be ruled out.

## 2025-07-22 NOTE — ASSESSMENT & PLAN NOTE
Home Rx: Sevelamer 1600 mg TID.   Check phos.  Last phosphorus level was 4.2 on 7/12.   Continue sevelamer.  Recommend renal diet

## 2025-07-22 NOTE — PLAN OF CARE
Problem: Prexisting or High Potential for Compromised Skin Integrity  Goal: Skin integrity is maintained or improved  Description: INTERVENTIONS:  - Identify patients at risk for skin breakdown  - Assess and monitor skin integrity including under and around medical devices   - Assess and monitor nutrition and hydration status  - Monitor labs  - Assess for incontinence   - Turn and reposition patient  - Assist with mobility/ambulation  - Relieve pressure over chente prominences   - Avoid friction and shearing  - Provide appropriate hygiene as needed including keeping skin clean and dry  - Evaluate need for skin moisturizer/barrier cream  - Collaborate with interdisciplinary team  - Patient/family teaching  - Consider wound care consult    Assess:  - Review Praful scale daily  - Clean and moisturize skin every day  - Inspect skin when repositioning, toileting, and assisting with ADLS  - Assess extremities for adequate circulation and sensation     Bed Management:  - Have minimal linens on bed & keep smooth, unwrinkled  - Change linens as needed when moist or perspiring  - Avoid sitting or lying in one position for more than 2 hours while in bed?Keep HOB at 45 degrees   - Toileting:  - Offer bedside commode  - Assess for incontinence every hour    Activity:  - Mobilize patient 12 times a day  - Encourage activity and walks on unit  - Encourage or provide ROM exercises   - Turn and reposition patient every 2 Hours  - Use appropriate equipment to lift or move patient in bed  - Instruct/ Assist with weight shifting every hour when out of bed in chair  - Consider limitation of chair time 2 hour intervals    Skin Care:  - Avoid use of baby powder, tape, friction and shearing, hot water or constrictive clothing  - Relieve pressure over bony prominences using mepalex   - Do not massage red bony areas  Outcome: Progressing     Problem: METABOLIC, FLUID AND ELECTROLYTES - ADULT  Goal: Electrolytes maintained within normal  limits  Description: INTERVENTIONS:  - Monitor labs and assess patient for signs and symptoms of electrolyte imbalances  - Administer electrolyte replacement as ordered  - Monitor response to electrolyte replacements, including repeat lab results as appropriate  - Instruct patient on fluid and nutrition as appropriate  Outcome: Progressing  Goal: Fluid balance maintained  Description: INTERVENTIONS:  - Monitor labs   - Monitor I/O and WT  - Instruct patient on fluid and nutrition as appropriate  - Assess for signs & symptoms of volume excess or deficit  Outcome: Progressing  Goal: Glucose maintained within target range  Description: INTERVENTIONS:  - Monitor Blood Glucose as ordered  - Assess for signs and symptoms of hyperglycemia and hypoglycemia  - Administer ordered medications to maintain glucose within target range  - Assess nutritional intake and initiate nutrition service referral as needed  Outcome: Progressing     Problem: HEMATOLOGIC - ADULT  Goal: Maintains hematologic stability  Description: INTERVENTIONS  - Assess for signs and symptoms of bleeding or hemorrhage  - Monitor labs  - Administer supportive blood products/factors as ordered and appropriate  Outcome: Progressing     Problem: PAIN - ADULT  Goal: Verbalizes/displays adequate comfort level or baseline comfort level  Description: Interventions:  - Encourage patient to monitor pain and request assistance  - Assess pain using appropriate pain scale  - Administer analgesics as ordered based on type and severity of pain and evaluate response  - Implement non-pharmacological measures as appropriate and evaluate response  - Consider cultural and social influences on pain and pain management  - Notify physician/advanced practitioner if interventions unsuccessful or patient reports new pain  - Educate patient/family on pain management process including their role and importance of  reporting pain   - Provide non-pharmacologic/complimentary pain relief  interventions  Outcome: Progressing     Problem: INFECTION - ADULT  Goal: Absence or prevention of progression during hospitalization  Description: INTERVENTIONS:  - Assess and monitor for signs and symptoms of infection  - Monitor lab/diagnostic results  - Monitor all insertion sites, i.e. indwelling lines, tubes, and drains  - Monitor endotracheal if appropriate and nasal secretions for changes in amount and color  - [place] appropriate cooling/warming therapies per order  - Administer medications as ordered  - Instruct and encourage patient and family to use good hand hygiene technique  - Identify and instruct in appropriate isolation precautions for identified infection/condition  Outcome: Progressing  Goal: Absence of fever/infection during neutropenic period  Description: INTERVENTIONS:  - Monitor WBC  - Perform strict hand hygiene  - Limit to healthy visitors only  - No plants, dried, fresh or silk flowers with quiñonez in patient room  Outcome: Progressing     Problem: SAFETY ADULT  Goal: Patient will remain free of falls  Description: INTERVENTIONS:  - Educate patient/family on patient safety including physical limitations  - Instruct patient to call for assistance with activity   - Consider consulting OT/PT to assist with strengthening/mobility based on AM PAC & JH-HLM score  - Consult OT/PT to assist with strengthening/mobility   - Keep Call bell within reach  - Keep bed low and locked with side rails adjusted as appropriate  - Keep care items and personal belongings within reach  - Initiate and maintain comfort rounds  - Make Fall Risk Sign visible to staff  - Offer Toileting every 2 Hours, in advance of need  - Initiate/Maintain bed/chair alarm  - Obtain necessary fall risk management equipment: bracelet/socks   - Apply yellow socks and bracelet for high fall risk patients  - Consider moving patient to room near nurses station  Outcome: Progressing  Goal: Maintain or return to baseline ADL  function  Description: INTERVENTIONS:  -  Assess patient's ability to carry out ADLs; assess patient's baseline for ADL function and identify physical deficits which impact ability to perform ADLs (bathing, care of mouth/teeth, toileting, grooming, dressing, etc.)  - Assess/evaluate cause of self-care deficits   - Assess range of motion  - Assess patient's mobility; develop plan if impaired  - Assess patient's need for assistive devices and provide as appropriate  - Encourage maximum independence but intervene and supervise when necessary  - Involve family in performance of ADLs  - Assess for home care needs following discharge   - Consider OT consult to assist with ADL evaluation and planning for discharge  - Provide patient education as appropriate  - Monitor functional capacity and physical performance, use of AM PAC & JH-HLM   - Monitor gait, balance and fatigue with ambulation    Outcome: Progressing  Goal: Maintains/Returns to pre admission functional level  Description: INTERVENTIONS:  - Perform AM-PAC 6 Click Basic Mobility/ Daily Activity assessment daily.  - Set and communicate daily mobility goal to care team and patient/family/caregiver.   - Collaborate with rehabilitation services on mobility goals if consulted  - Perform Range of Motion 12 times a day.  - Reposition patient every 2 hours.  - Dangle patient 3 times a day  - Stand patient 3 times a day  - Ambulate patient 3 times a day  - Out of bed to chair 3 times a day   - Out of bed for meals 3 times a day  - Out of bed for toileting  - Record patient progress and toleration of activity level   Outcome: Progressing     Problem: DISCHARGE PLANNING  Goal: Discharge to home or other facility with appropriate resources  Description: INTERVENTIONS:  - Identify barriers to discharge w/patient and caregiver  - Arrange for needed discharge resources and transportation as appropriate  - Identify discharge learning needs (meds, wound care, etc.)  - Arrange for  interpretive services to assist at discharge as needed  - Refer to Case Management Department for coordinating discharge planning if the patient needs post-hospital services based on physician/advanced practitioner order or complex needs related to functional status, cognitive ability, or social support system  Outcome: Progressing     Problem: Knowledge Deficit  Goal: Patient/family/caregiver demonstrates understanding of disease process, treatment plan, medications, and discharge instructions  Description: Complete learning assessment and assess knowledge base.  Interventions:  - Provide teaching at level of understanding  - Provide teaching via preferred learning methods  Outcome: Progressing     Problem: RESPIRATORY - ADULT  Goal: Achieves optimal ventilation and oxygenation  Description: INTERVENTIONS:  - Assess for changes in respiratory status  - Assess for changes in mentation and behavior  - Position to facilitate oxygenation and minimize respiratory effort  - Oxygen administered by appropriate delivery if ordered  - Initiate smoking cessation education as indicated  - Encourage broncho-pulmonary hygiene including cough, deep breathe, Incentive Spirometry  - Assess the need for suctioning and aspirate as needed  - Assess and instruct to report SOB or any respiratory difficulty  - Respiratory Therapy support as indicated  Outcome: Progressing     Problem: Nutrition/Hydration-ADULT  Goal: Nutrient/Hydration intake appropriate for improving, restoring or maintaining nutritional needs  Description: Monitor and assess patient's nutrition/hydration status for malnutrition. Collaborate with interdisciplinary team and initiate plan and interventions as ordered.  Monitor patient's weight and dietary intake as ordered or per policy. Utilize nutrition screening tool and intervene as necessary. Determine patient's food preferences and provide high-protein, high-caloric foods as appropriate.     INTERVENTIONS:  -  Monitor oral intake, urinary output, labs, and treatment plans  - Assess nutrition and hydration status and recommend course of action  - Evaluate amount of meals eaten  - Assist patient with eating if necessary   - Allow adequate time for meals  - Recommend/ encourage appropriate diets, oral nutritional supplements, and vitamin/mineral supplements  - Order, calculate, and assess calorie counts as needed  - Recommend, monitor, and adjust tube feedings and TPN/PPN based on assessed needs  - Assess need for intravenous fluids  - Provide specific nutrition/hydration education as appropriate  - Include patient/family/caregiver in decisions related to nutrition  Outcome: Progressing

## 2025-07-22 NOTE — PLAN OF CARE
Problem: Prexisting or High Potential for Compromised Skin Integrity  Goal: Skin integrity is maintained or improved  Description: INTERVENTIONS:  - Identify patients at risk for skin breakdown  - Assess and monitor skin integrity including under and around medical devices   - Assess and monitor nutrition and hydration status  - Monitor labs  - Assess for incontinence   - Turn and reposition patient  - Assist with mobility/ambulation  - Relieve pressure over chente prominences   - Avoid friction and shearing  - Provide appropriate hygiene as needed including keeping skin clean and dry  - Evaluate need for skin moisturizer/barrier cream  - Collaborate with interdisciplinary team  - Patient/family teaching  - Consider wound care consult    Assess:  - Review Praful scale daily  - Clean and moisturize skin every day  - Inspect skin when repositioning, toileting, and assisting with ADLS  - Assess extremities for adequate circulation and sensation     Bed Management:  - Have minimal linens on bed & keep smooth, unwrinkled  - Change linens as needed when moist or perspiring  - Avoid sitting or lying in one position for more than 2 hours while in bed?Keep HOB at 45 degrees   - Toileting:  - Offer bedside commode  - Assess for incontinence every hour    Activity:  - Mobilize patient 12 times a day  - Encourage activity and walks on unit  - Encourage or provide ROM exercises   - Turn and reposition patient every 2 Hours  - Use appropriate equipment to lift or move patient in bed  - Instruct/ Assist with weight shifting every hour when out of bed in chair  - Consider limitation of chair time 2 hour intervals    Skin Care:  - Avoid use of baby powder, tape, friction and shearing, hot water or constrictive clothing  - Relieve pressure over bony prominences using mepalex   - Do not massage red bony areas  Outcome: Progressing     Problem: METABOLIC, FLUID AND ELECTROLYTES - ADULT  Goal: Electrolytes maintained within normal  limits  Description: INTERVENTIONS:  - Monitor labs and assess patient for signs and symptoms of electrolyte imbalances  - Administer electrolyte replacement as ordered  - Monitor response to electrolyte replacements, including repeat lab results as appropriate  - Instruct patient on fluid and nutrition as appropriate  Outcome: Progressing  Goal: Fluid balance maintained  Description: INTERVENTIONS:  - Monitor labs   - Monitor I/O and WT  - Instruct patient on fluid and nutrition as appropriate  - Assess for signs & symptoms of volume excess or deficit  Outcome: Progressing  Goal: Glucose maintained within target range  Description: INTERVENTIONS:  - Monitor Blood Glucose as ordered  - Assess for signs and symptoms of hyperglycemia and hypoglycemia  - Administer ordered medications to maintain glucose within target range  - Assess nutritional intake and initiate nutrition service referral as needed  Outcome: Progressing     Problem: HEMATOLOGIC - ADULT  Goal: Maintains hematologic stability  Description: INTERVENTIONS  - Assess for signs and symptoms of bleeding or hemorrhage  - Monitor labs  - Administer supportive blood products/factors as ordered and appropriate  Outcome: Progressing     Problem: RESPIRATORY - ADULT  Goal: Achieves optimal ventilation and oxygenation  Description: INTERVENTIONS:  - Assess for changes in respiratory status  - Assess for changes in mentation and behavior  - Position to facilitate oxygenation and minimize respiratory effort  - Oxygen administered by appropriate delivery if ordered  - Initiate smoking cessation education as indicated  - Encourage broncho-pulmonary hygiene including cough, deep breathe, Incentive Spirometry  - Assess the need for suctioning and aspirate as needed  - Assess and instruct to report SOB or any respiratory difficulty  - Respiratory Therapy support as indicated  Outcome: Progressing     Problem: INFECTION - ADULT  Goal: Absence or prevention of progression  during hospitalization  Description: INTERVENTIONS:  - Assess and monitor for signs and symptoms of infection  - Monitor lab/diagnostic results  - Monitor all insertion sites, i.e. indwelling lines, tubes, and drains  - Monitor endotracheal if appropriate and nasal secretions for changes in amount and color  - Shaw appropriate cooling/warming therapies per order  - Administer medications as ordered  - Instruct and encourage patient and family to use good hand hygiene technique  - Identify and instruct in appropriate isolation precautions for identified infection/condition  Outcome: Progressing  Goal: Absence of fever/infection during neutropenic period  Description: INTERVENTIONS:  - Monitor WBC  - Perform strict hand hygiene  - Limit to healthy visitors only  - No plants, dried, fresh or silk flowers with quiñonez in patient room  Outcome: Progressing     Problem: PAIN - ADULT  Goal: Verbalizes/displays adequate comfort level or baseline comfort level  Description: Interventions:  - Encourage patient to monitor pain and request assistance  - Assess pain using appropriate pain scale  - Administer analgesics as ordered based on type and severity of pain and evaluate response  - Implement non-pharmacological measures as appropriate and evaluate response  - Consider cultural and social influences on pain and pain management  - Notify physician/advanced practitioner if interventions unsuccessful or patient reports new pain  - Educate patient/family on pain management process including their role and importance of  reporting pain   - Provide non-pharmacologic/complimentary pain relief interventions  Outcome: Progressing     Problem: SAFETY ADULT  Goal: Patient will remain free of falls  Description: INTERVENTIONS:  - Educate patient/family on patient safety including physical limitations  - Instruct patient to call for assistance with activity   - Consider consulting OT/PT to assist with strengthening/mobility based on  AM PAC & -HLM score  - Consult OT/PT to assist with strengthening/mobility   - Keep Call bell within reach  - Keep bed low and locked with side rails adjusted as appropriate  - Keep care items and personal belongings within reach  - Initiate and maintain comfort rounds  - Make Fall Risk Sign visible to staff  - Offer Toileting every 2 Hours, in advance of need  - Initiate/Maintain bed/chair alarm  - Obtain necessary fall risk management equipment: bracelet/socks   - Apply yellow socks and bracelet for high fall risk patients  - Consider moving patient to room near nurses station  Outcome: Progressing  Goal: Maintain or return to baseline ADL function  Description: INTERVENTIONS:  -  Assess patient's ability to carry out ADLs; assess patient's baseline for ADL function and identify physical deficits which impact ability to perform ADLs (bathing, care of mouth/teeth, toileting, grooming, dressing, etc.)  - Assess/evaluate cause of self-care deficits   - Assess range of motion  - Assess patient's mobility; develop plan if impaired  - Assess patient's need for assistive devices and provide as appropriate  - Encourage maximum independence but intervene and supervise when necessary  - Involve family in performance of ADLs  - Assess for home care needs following discharge   - Consider OT consult to assist with ADL evaluation and planning for discharge  - Provide patient education as appropriate  - Monitor functional capacity and physical performance, use of AM PAC & -[facility]   - Monitor gait, balance and fatigue with ambulation    Outcome: Progressing  Goal: Maintains/Returns to pre admission functional level  Description: INTERVENTIONS:  - Perform AM-PAC 6 Click Basic Mobility/ Daily Activity assessment daily.  - Set and communicate daily mobility goal to care team and patient/family/caregiver.   - Collaborate with rehabilitation services on mobility goals if consulted  - Perform Range of Motion 12 times a day.  -  Reposition patient every 2 hours.  - Dangle patient 3 times a day  - Stand patient 3 times a day  - Ambulate patient 3 times a day  - Out of bed to chair 3 times a day   - Out of bed for meals 3 times a day  - Out of bed for toileting  - Record patient progress and toleration of activity level   Outcome: Progressing     Problem: DISCHARGE PLANNING  Goal: Discharge to home or other facility with appropriate resources  Description: INTERVENTIONS:  - Identify barriers to discharge w/patient and caregiver  - Arrange for needed discharge resources and transportation as appropriate  - Identify discharge learning needs (meds, wound care, etc.)  - Arrange for interpretive services to assist at discharge as needed  - Refer to Case Management Department for coordinating discharge planning if the patient needs post-hospital services based on physician/advanced practitioner order or complex needs related to functional status, cognitive ability, or social support system  Outcome: Progressing     Problem: Knowledge Deficit  Goal: Patient/family/caregiver demonstrates understanding of disease process, treatment plan, medications, and discharge instructions  Description: Complete learning assessment and assess knowledge base.  Interventions:  - Provide teaching at level of understanding  - Provide teaching via preferred learning methods  Outcome: Progressing

## 2025-07-23 LAB
ANION GAP SERPL CALCULATED.3IONS-SCNC: 6 MMOL/L (ref 4–13)
BASOPHILS # BLD AUTO: 0.03 THOUSANDS/ÂΜL (ref 0–0.1)
BASOPHILS NFR BLD AUTO: 1 % (ref 0–1)
BUN SERPL-MCNC: 31 MG/DL (ref 5–25)
CALCIUM SERPL-MCNC: 8.9 MG/DL (ref 8.4–10.2)
CHLORIDE SERPL-SCNC: 96 MMOL/L (ref 96–108)
CO2 SERPL-SCNC: 29 MMOL/L (ref 21–32)
CREAT SERPL-MCNC: 5.06 MG/DL (ref 0.6–1.3)
EOSINOPHIL # BLD AUTO: 0.25 THOUSAND/ÂΜL (ref 0–0.61)
EOSINOPHIL NFR BLD AUTO: 5 % (ref 0–6)
ERYTHROCYTE [DISTWIDTH] IN BLOOD BY AUTOMATED COUNT: 15.4 % (ref 11.6–15.1)
GFR SERPL CREATININE-BSD FRML MDRD: 10 ML/MIN/1.73SQ M
GLUCOSE SERPL-MCNC: 108 MG/DL (ref 65–140)
HCT VFR BLD AUTO: 34.4 % (ref 36.5–49.3)
HGB BLD-MCNC: 10.5 G/DL (ref 12–17)
IMM GRANULOCYTES # BLD AUTO: 0.02 THOUSAND/UL (ref 0–0.2)
IMM GRANULOCYTES NFR BLD AUTO: 0 % (ref 0–2)
LYMPHOCYTES # BLD AUTO: 1.07 THOUSANDS/ÂΜL (ref 0.6–4.47)
LYMPHOCYTES NFR BLD AUTO: 20 % (ref 14–44)
MAGNESIUM SERPL-MCNC: 1.9 MG/DL (ref 1.9–2.7)
MCH RBC QN AUTO: 28.4 PG (ref 26.8–34.3)
MCHC RBC AUTO-ENTMCNC: 30.5 G/DL (ref 31.4–37.4)
MCV RBC AUTO: 93 FL (ref 82–98)
MONOCYTES # BLD AUTO: 0.68 THOUSAND/ÂΜL (ref 0.17–1.22)
MONOCYTES NFR BLD AUTO: 12 % (ref 4–12)
NEUTROPHILS # BLD AUTO: 3.44 THOUSANDS/ÂΜL (ref 1.85–7.62)
NEUTS SEG NFR BLD AUTO: 62 % (ref 43–75)
NRBC BLD AUTO-RTO: 0 /100 WBCS
PHOSPHATE SERPL-MCNC: 3.3 MG/DL (ref 2.3–4.1)
PLATELET # BLD AUTO: 217 THOUSANDS/UL (ref 149–390)
PMV BLD AUTO: 10 FL (ref 8.9–12.7)
POTASSIUM SERPL-SCNC: 4.7 MMOL/L (ref 3.5–5.3)
RBC # BLD AUTO: 3.7 MILLION/UL (ref 3.88–5.62)
SODIUM SERPL-SCNC: 131 MMOL/L (ref 135–147)
WBC # BLD AUTO: 5.49 THOUSAND/UL (ref 4.31–10.16)

## 2025-07-23 PROCEDURE — 97530 THERAPEUTIC ACTIVITIES: CPT

## 2025-07-23 PROCEDURE — 99232 SBSQ HOSP IP/OBS MODERATE 35: CPT | Performed by: INTERNAL MEDICINE

## 2025-07-23 PROCEDURE — 99232 SBSQ HOSP IP/OBS MODERATE 35: CPT

## 2025-07-23 PROCEDURE — 85025 COMPLETE CBC W/AUTO DIFF WBC: CPT

## 2025-07-23 PROCEDURE — 80048 BASIC METABOLIC PNL TOTAL CA: CPT

## 2025-07-23 PROCEDURE — 97167 OT EVAL HIGH COMPLEX 60 MIN: CPT

## 2025-07-23 PROCEDURE — 83735 ASSAY OF MAGNESIUM: CPT

## 2025-07-23 PROCEDURE — 84100 ASSAY OF PHOSPHORUS: CPT

## 2025-07-23 PROCEDURE — 94760 N-INVAS EAR/PLS OXIMETRY 1: CPT

## 2025-07-23 RX ORDER — IPRATROPIUM BROMIDE AND ALBUTEROL SULFATE 2.5; .5 MG/3ML; MG/3ML
3 SOLUTION RESPIRATORY (INHALATION) EVERY 6 HOURS PRN
Status: DISCONTINUED | OUTPATIENT
Start: 2025-07-23 | End: 2025-07-24 | Stop reason: HOSPADM

## 2025-07-23 RX ORDER — OLANZAPINE 10 MG/2ML
5 INJECTION, POWDER, FOR SOLUTION INTRAMUSCULAR ONCE
Status: DISCONTINUED | OUTPATIENT
Start: 2025-07-23 | End: 2025-07-24

## 2025-07-23 RX ADMIN — LIDOCAINE 1 PATCH: 50 PATCH CUTANEOUS at 08:06

## 2025-07-23 RX ADMIN — Medication 2.5 MG: at 23:25

## 2025-07-23 RX ADMIN — HYDROMORPHONE HYDROCHLORIDE 0.2 MG: 0.2 INJECTION, SOLUTION INTRAMUSCULAR; INTRAVENOUS; SUBCUTANEOUS at 05:39

## 2025-07-23 RX ADMIN — Medication 3 MG: at 21:09

## 2025-07-23 RX ADMIN — SEVELAMER HYDROCHLORIDE 1600 MG: 800 TABLET ORAL at 08:05

## 2025-07-23 RX ADMIN — NIFEDIPINE 30 MG: 30 TABLET, EXTENDED RELEASE ORAL at 17:15

## 2025-07-23 RX ADMIN — HEPARIN SODIUM 5000 UNITS: 5000 INJECTION INTRAVENOUS; SUBCUTANEOUS at 13:39

## 2025-07-23 RX ADMIN — ALLOPURINOL 100 MG: 100 TABLET ORAL at 08:06

## 2025-07-23 RX ADMIN — FUROSEMIDE 20 MG: 20 TABLET ORAL at 16:35

## 2025-07-23 RX ADMIN — SEVELAMER HYDROCHLORIDE 1600 MG: 800 TABLET ORAL at 16:35

## 2025-07-23 RX ADMIN — FAMOTIDINE 20 MG: 20 TABLET, FILM COATED ORAL at 21:09

## 2025-07-23 RX ADMIN — METOPROLOL SUCCINATE 25 MG: 25 TABLET, EXTENDED RELEASE ORAL at 21:09

## 2025-07-23 RX ADMIN — Medication 2.5 MG: at 03:12

## 2025-07-23 RX ADMIN — HEPARIN SODIUM 5000 UNITS: 5000 INJECTION INTRAVENOUS; SUBCUTANEOUS at 21:09

## 2025-07-23 RX ADMIN — GABAPENTIN 100 MG: 100 CAPSULE ORAL at 08:06

## 2025-07-23 RX ADMIN — ATORVASTATIN CALCIUM 80 MG: 80 TABLET, FILM COATED ORAL at 16:35

## 2025-07-23 RX ADMIN — METOPROLOL SUCCINATE 25 MG: 25 TABLET, EXTENDED RELEASE ORAL at 08:05

## 2025-07-23 RX ADMIN — FUROSEMIDE 20 MG: 20 TABLET ORAL at 08:06

## 2025-07-23 RX ADMIN — ASPIRIN 81 MG: 81 TABLET, CHEWABLE ORAL at 08:06

## 2025-07-23 RX ADMIN — SEVELAMER HYDROCHLORIDE 1600 MG: 800 TABLET ORAL at 11:45

## 2025-07-23 RX ADMIN — HEPARIN SODIUM 5000 UNITS: 5000 INJECTION INTRAVENOUS; SUBCUTANEOUS at 05:39

## 2025-07-23 NOTE — ASSESSMENT & PLAN NOTE
Hemoglobin currently at target range at 10.5 g/dL, continue to monitor.  He is on Mircera in the outpatient setting.

## 2025-07-23 NOTE — ASSESSMENT & PLAN NOTE
Lab Results   Component Value Date    EGFR 10 07/23/2025    EGFR 9 07/22/2025    EGFR 12 07/21/2025    CREATININE 5.06 (H) 07/23/2025    CREATININE 5.76 (H) 07/22/2025    CREATININE 4.44 (H) 07/21/2025   On dialysis HD   Appreciate nephrology recommendations   continue home binders

## 2025-07-23 NOTE — PLAN OF CARE
Problem: Prexisting or High Potential for Compromised Skin Integrity  Goal: Skin integrity is maintained or improved  Description: INTERVENTIONS:  - Identify patients at risk for skin breakdown  - Assess and monitor skin integrity including under and around medical devices   - Assess and monitor nutrition and hydration status  - Monitor labs  - Assess for incontinence   - Turn and reposition patient  - Assist with mobility/ambulation  - Relieve pressure over chente prominences   - Avoid friction and shearing  - Provide appropriate hygiene as needed including keeping skin clean and dry  - Evaluate need for skin moisturizer/barrier cream  - Collaborate with interdisciplinary team  - Patient/family teaching  - Consider wound care consult    Assess:  - Review Praful scale daily  - Clean and moisturize skin every day  - Inspect skin when repositioning, toileting, and assisting with ADLS  - Assess extremities for adequate circulation and sensation     Bed Management:  - Have minimal linens on bed & keep smooth, unwrinkled  - Change linens as needed when moist or perspiring  - Avoid sitting or lying in one position for more than 2 hours while in bed?Keep HOB at 45 degrees   - Toileting:  - Offer bedside commode  - Assess for incontinence every hour    Activity:  - Mobilize patient 12 times a day  - Encourage activity and walks on unit  - Encourage or provide ROM exercises   - Turn and reposition patient every 2 Hours  - Use appropriate equipment to lift or move patient in bed  - Instruct/ Assist with weight shifting every hour when out of bed in chair  - Consider limitation of chair time 2 hour intervals    Skin Care:  - Avoid use of baby powder, tape, friction and shearing, hot water or constrictive clothing  - Relieve pressure over bony prominences using mepalex   - Do not massage red bony areas  Outcome: Progressing

## 2025-07-23 NOTE — ASSESSMENT & PLAN NOTE
Home Rx: Sevelamer 1600 mg TID.   Check phos.  Last phosphorus level was 3.3.   Continue sevelamer.  Recommend renal diet

## 2025-07-23 NOTE — ASSESSMENT & PLAN NOTE
-Continue management per primary team.  Was restarted on gabapentin 100 mg daily on 7/22, monitor response, patient mentions gabapentin does not work.  Would recommend outpatient pain management

## 2025-07-23 NOTE — ASSESSMENT & PLAN NOTE
Toxic metabolic encephalopathy secondary to missed dialysis sessions  Status post urgent HD yesterday 7/20, plan for an additional suction with extra ultrafiltration tomorrow 7/22  Today seems to be alert and oriented to person and place and time with limitations. He is a little bit confused, called his sister asked to go home, then reported to me that he is going to rehab where he came from . Unable to recall rehab center name.. patient has history of anxiety disorder which could be affecting him.     Will continue to re orient and discuss with him.

## 2025-07-23 NOTE — PROGRESS NOTES
Progress Note - Nephrology   Name: Naresh Carpio 65 y.o. male I MRN: [id number]  Unit/Bed#: [address] [place] [address] I Date of Admission: 7/20/2025   Date of Service: 7/23/2025 I Hospital Day: 2    Assessment & Plan  ESRD (end stage renal disease) ([facility])  [facility] TTS  Access: R IJ permcath.   EDW 89.5 kg.   Status post urgent HD on 7/20 for fluid overload and hyperkalemia  Status post intermittent HD on 7/22 for 140 minutes and  2.3 lts fluid removed.  Still with lower extremity edema but otherwise no signs of fluid overload.  Next HD treatment tomorrow    Encephalopathy  Mental status has improved and is back to baseline, oriented x 3 currently.  Possibility of uremic encephalopathy from missed couple of dialysis treatment cannot be ruled out.    Acute respiratory failure with hypoxia ([facility])  Chest x-ray with vascular congestion.  Status post urgent HD on 7/20 and volume status improving currently on oxygen nasal cannula.   Hemodialysis today with ultrafiltration    Essential hypertension  BP elevated significantly today, pain could be contributing continue current medications.    Home Rx: Nifedipine 30 mg daily, Metoprolol succ 25 mg BID?, furosemide 20 mg twice a day.  Current continue current medication if it is persistently elevated may consider increasing the dose of nifedipine to 60 mg daily    Anemia in ESRD (end-stage renal disease)  ([facility])  Hemoglobin currently at target range at 10.5 g/dL, continue to monitor.  He is on Mircera in the outpatient setting.    Chronic kidney disease-mineral and bone disorder  Home Rx: Sevelamer 1600 mg TID.   Check phos.  Last phosphorus level was 3.3.   Continue sevelamer.  Recommend renal diet    PAD (peripheral artery disease) ([facility])  -Continue management per primary team on home aspirin and statins  Paroxysmal atrial fibrillation ([facility])  -On metoprolol and nifedipine.  Management per primary team not on anticoagulants at home  Neuropathy  -Continue management per primary  team.  Was restarted on gabapentin 100 mg daily on 7/22, monitor response, patient mentions gabapentin does not work.  Would recommend outpatient pain management  Hyponatremia  -Sodium slightly low at 131 meq/L, recommend fluid restriction    I have reviewed the nephrology recommendations including plan for next dialysis treatment tomorrow and okay for discharge from nephrology side if next blood pressure is acceptable, with primary team, and we are in agreement with renal plan including the information outlined above.     Subjective   Complain of bilateral lower extremity pain.  No shortness of breath or chest pain, still with lower extremity edema    Objective :  Temp:  [97.3 °F (36.3 °C)-98.7 °F (37.1 °C)] 98 °F (36.7 °C)  HR:  [56-74] 74  BP: (112-206)/(52-79) 206/79  Resp:  [15-20] 20  SpO2:  [94 %-99 %] 96 %  O2 Device: None (Room air)    Current Weight: Weight - Scale: 98.1 kg (216 lb 4.3 oz)  First Weight: Weight - Scale: 98.1 kg (216 lb 4.3 oz)  I/O         07/21 0701  07/22 0700 07/22 0701  07/23 0700 07/23 0701  07/24 0700    P.O.       I.V. (mL/kg)  500 (5.1)     Total Intake(mL/kg)  500 (5.1)     Urine (mL/kg/hr) 150 (0.1) 150 (0.1)     Other  8850     Total Output 150 9000     Net -150 -8500            Unmeasured Urine Occurrence  2 x     Unmeasured Stool Occurrence  1 x           Physical Exam   General:  Ill looking, awake.  Eyes: Conjunctivae pink,  Sclera anicteric  ENT: lips and mucous membranes moist  Neck: supple   Chest: Clear to Auscultation both lungs,  no crackles, ronchus or wheezing.  CVS: S1 & S2 present, normal rate, regular rhythm, no murmur.  Abdomen: soft, non-tender, non-distended, Bowel sounds normoactive  Extremities: 1+ edema both lower extremities  Skin: no rash  Neuro: awake, alert, oriented  Psych: Mood and affect appropriate    Medications:  Current Medications[1]      Lab Results: I have reviewed the following results:  Results from last 7 days   Lab Units 07/23/25  0105  "07/22/25  0443 07/21/25  0601 07/20/25  0354   WBC Thousand/uL 5.49  --  5.56 8.32   HEMOGLOBIN g/dL 10.5*  --  10.1* 9.9*   HEMATOCRIT % 34.4*  --  33.4* 31.9*   PLATELETS Thousands/uL 217  --  205 219   POTASSIUM mmol/L 4.7 5.1 4.3 5.4*   CHLORIDE mmol/L 96 96 97 97   CO2 mmol/L 29 28 29 27   BUN mg/dL 31* 33* 23 37*   CREATININE mg/dL 5.06* 5.76* 4.44* 5.61*   CALCIUM mg/dL 8.9 8.7 8.7 8.6   MAGNESIUM mg/dL 1.9  --  2.0  --    PHOSPHORUS mg/dL 3.3  --   --   --    ALBUMIN g/dL  --   --   --  3.4*       Administrative Statements     Portions of the record may have been created with voice recognition software. Occasional wrong word or \"sound a like\" substitutions may have occurred due to the inherent limitations of voice recognition software. Read the chart carefully and recognize, using context, where substitutions have occurred.If you have any questions, please contact the dictating provider.       [1]   Current Facility-Administered Medications:     acetaminophen (TYLENOL) tablet 650 mg, 650 mg, Oral, Q6H PRN, Gennaro Zelaya DO, 650 mg at 07/22/25 2123    allopurinol (ZYLOPRIM) tablet 100 mg, 100 mg, Oral, Daily, Gennaro Zelaya DO, 100 mg at 07/23/25 0806    aspirin chewable tablet 81 mg, 81 mg, Oral, Daily, Gennaro Zelaya DO, 81 mg at 07/23/25 0806    atorvastatin (LIPITOR) tablet 80 mg, 80 mg, Oral, Daily With Dinner, Gennaro Zelaya DO, 80 mg at 07/22/25 1624    famotidine (PEPCID) tablet 20 mg, 20 mg, Oral, HS, Gennaro Zelaya DO, 20 mg at 07/22/25 2123    furosemide (LASIX) tablet 20 mg, 20 mg, Oral, BID (diuretic), Gennaro Zelaya DO, 20 mg at 07/23/25 0806    gabapentin (NEURONTIN) capsule 100 mg, 100 mg, Oral, Daily, Gennaro Zelaya DO, 100 mg at 07/23/25 0806    heparin (porcine) subcutaneous injection 5,000 Units, 5,000 Units, Subcutaneous, Q8H [facility], Gennaro Zelaya DO, 5,000 Units at 07/23/25 0539    HYDROmorphone HCl (DILAUDID) " injection 0.2 mg, 0.2 mg, Intravenous, Q4H PRN, GRANT Rich, 0.2 mg at 07/23/25 0539    ipratropium-albuterol (DUO-NEB) 0.5-2.5 mg/3 mL inhalation solution 3 mL, 3 mL, Nebulization, 4x Daily, Gennaro Zelaya DO, 3 mL at 07/21/25 1547    lidocaine (LIDODERM) 5 % patch 1 patch, 1 patch, Topical, Daily, GRANT Rich, 1 patch at 07/23/25 0806    metoprolol succinate (TOPROL-XL) 24 hr tablet 25 mg, 25 mg, Oral, Q12H KEMAL, Gennaro Zelaya DO, 25 mg at 07/23/25 0805    NIFEdipine (PROCARDIA XL) 24 hr tablet 30 mg, 30 mg, Oral, After Dinner, Gennaro Zelaya DO, 30 mg at 07/22/25 1728    ondansetron (ZOFRAN) injection 4 mg, 4 mg, Intravenous, Q6H PRN, Gennaro Zelaya DO, 4 mg at 07/21/25 1202    oxyCODONE (ROXICODONE) split tablet 2.5 mg, 2.5 mg, Oral, Q8H PRN, Gennaro Zelaya DO, 2.5 mg at 07/23/25 0312    senna (SENOKOT) tablet 8.6 mg, 8.6 mg, Oral, Daily With Lunch, Gennaro Zelaya DO, 8.6 mg at 07/22/25 1302    sevelamer (RENAGEL) tablet 1,600 mg, 1,600 mg, Oral, TID With Meals, Gennaro Zelaya DO, 1,600 mg at 07/23/25 0805

## 2025-07-23 NOTE — ASSESSMENT & PLAN NOTE
Continue home metoprolol, nifedipine, Lasix  Nephrology following ; plan to increase nefidipine if BP remains persistently elevated

## 2025-07-23 NOTE — OCCUPATIONAL THERAPY NOTE
"Occupational Therapy Evaluation/Treatment         07/23/25 1015   OT Last Visit   OT Visit Date 07/23/25   Note Type   Note type Evaluation   Pain Assessment   Pain Assessment Tool 0-10   Pain Score No Pain   Restrictions/Precautions   Weight Bearing Precautions Per Order No   Braces or Orthoses LSO  (Pt wears LSO brace during OOB activity or when HOB elevated to 45 degrees, however the brace was left at the facility where he was admitted from; OT given clearance from Dr. Bruce to engage Pt in OOB activity without brace.)   Other Precautions Contact/isolation;Cognitive;Chair Alarm;Bed Alarm;Fall Risk;Pain;Visual impairment   Home Living   Type of Home SNF  ([facility])   Home Equipment Walker;Wheelchair-manual;Hospital bed   Prior Function   Level of [place] Independent with ADLs;Independent with functional mobility;Needs assistance with IADLS   Lives With Facility staff   Receives Help From Other (Comment);Family  (Facility Staff)   IADLs Family/Friend/Other provides transportation;Family/Friend/Other provides meals;Family/Friend/Other provides medication management   Falls in the last 6 months 1 to 4   Vocational On disability   Comments Pt reports to have been dressing, bathing, and ambulating by self using RW at facility.   Lifestyle   Intrinsic Gratification Pt enjoys baseball and likes the [employer].   General   Additional Pertinent History Pt is a 65 y.o. male with a PMHx of ESRD on HD, chronic anemia, PAD, PAF, hypertension, who was admitted to [facility] with altered mental status after missing the last 1 or 2 HD sessions. As per chart, this is apparently normal for him to become confused when missing dialysis. The patient resides at a skilled nursing facility and is chronically on hemodialysis.   Family/Caregiver Present No   Subjective   Subjective \"I like my freedom\"   ADL   Eating Assistance 5  Supervision/Setup   Eating Deficit Setup;Supervision/safety   Grooming Assistance 4  Minimal Assistance "   Grooming Deficit Setup;Supervision/safety   UB Bathing Assistance 4  Minimal Assistance   UB Bathing Deficit Setup;Supervision/safety   LB Bathing Assistance 3  Moderate Assistance   LB Bathing Deficit Setup;Supervision/safety   UB Dressing Assistance 4  Minimal Assistance   UB Dressing Deficit Setup;Supervision/safety   LB Dressing Assistance 3  Moderate Assistance   LB Dressing Deficit Setup;Supervision/safety   Toileting Assistance  3  Moderate Assistance   Toileting Deficit Setup;Supervison/safety   Transfers   Stand to Sit 5  Supervision  (From RW to chair.)   Additional items Assist x 1;Armrests   Additional Comments Pt met with nurse aide standing in bathroom.   Functional Mobility   Functional Mobility 5  Supervision   Additional Comments Pt ambulated from bathroom to bedroom.   Additional items Rolling walker   Balance   Static Sitting Fair +   Dynamic Sitting Fair   Static Standing Fair -   Dynamic Standing Fair -   Activity Tolerance   Activity Tolerance Patient tolerated treatment well   Nurse Made Aware Yes, Niharika RAWLS   RUE Assessment   RUE Assessment WFL   LUE Assessment   LUE Assessment WFL   Hand Function   Gross Motor Coordination Functional   Fine Motor Coordination Functional   Vision-Basic Assessment   Current Vision Wears glasses only for reading  (Pt reports to have blurry vision in left and right eyes. Pt reports right eye vision is much more blurry than left eye vision)   Visual History Other (Comment)  (PMHx of retinopathy and detached retina of right eye)   Patient Visual Report Blurring of print when reading   Vision - Complex Assessment   Tracking Impaired  (As per chart, decreased right eye to right visual fields)   Acuity   (Unable to read small print greater than 10 inches away from face. Able to read large print within 3 feet. Able to detect motion from changing screen on television and depict shapes on television.)   Visual Screen Results Decreased visual acuity   Cognition    Overall Cognitive Status WFL   Arousal/Participation Alert;Responsive;Cooperative   Attention Within functional limits   Orientation Level Oriented to person;Oriented to place;Oriented to situation   Memory Within functional limits   Following Commands Follows multistep commands without difficulty   Assessment   Limitation Decreased ADL status;Decreased endurance;Decreased self-care trans;Decreased high-level ADLs;Visual deficit;Decreased UE strength   Prognosis Fair   Assessment Patient evaluated by Occupational Therapy.  Patient admitted with Encephalopathy.  The patients occupational profile, medical and therapy history includes a extensive additional review of physical, cognitive, or psychosocial history related to current functional performance.  Comorbidities affecting functional mobility and ADLS include: CKD, COVID-19, diabetes, ESRD on hemodialysis, hypertension, osteoarthritis, and PAD .  Prior to admission, patient was independent with functional mobility with RW, independent with ADLS, requiring assist for IADLS, in short term rehab, having 24 hour care , and on disability.  The evaluation identifies the following performance deficits: impaired balance, decreased endurance, increased fall risk, decreased ADLS, decreased IADLS, decreased strength, decreased trunk control, decreased balance, and decreased mobility, that result in activity limitations and/or participation restrictions. This evaluation requires clinical decision making of high complexity, because the patient presents with comorbidites that affect occupational performance and required significant modification of tasks or assistance with consideration of multiple treatment options.  The patient's raw score on the AM-PAC Daily Activity Inpatient Short Form is 16. A raw score of less than 19 suggests the patient may benefit from discharge to post-acute rehabilitation services. Please refer to the recommendation of the Occupational Therapist  "for safe discharge planning.  Patient will benefit from skilled Occupational Therapy services to address above deficits and facilitate a safe return to prior level of function.   Goals   Patient Goals \"To get out of here so I can do what I want to do\"   STG Time Frame   (1-7 days)   Short Term Goal  Goals established to promote Patient Goals: \"To get out of here so I can do what I want to do\":  Eating: independent; Grooming: supervision standing at sink; Bathing: min assist; Upper Body Dressing supervision; Lower Body Dressing: min assist; Toileting: min assist; Patient will increase ambulatory bed, chair, commode transfer to independent with rolling walker to increase performance and safety with ADLS and functional mobility; Patient will increase standing tolerance to 10 minutes during ADL task to decrease assistance level and decrease fall risk; Patient will increase bed mobility to independent in preparation for ADLS and transfers; Patient will increase functional mobility to and from bathroom with rolling walker independently to increase performance with ADLS and to use a toilet; Patient will tolerate 10 minutes of UE ROM/strengthening to increase general activity tolerance and performance in ADLS/IADLS; Patient will improve functional activity tolerance to 15 minutes of sustained functional tasks to increase participation in basic self-care and decrease assistance level;  Patient will be able to to verbalize understanding and perform energy conservation/proper body mechanics during ADLS and functional mobility at least 50% of the time with moderate cueing to decrease signs of fatigue and increase stamina to return to prior level of function; Patient will increase dynamic sitting balance to fair+ to improve the ability to sit at edge of bed or on a chair for ADLS;  Patient will increase static/dynamic standing balance to fair to improve postural stability and decrease fall risk during standing ADLS and " "transfers.   LTG Time Frame   (8-14 days)   Long Term Goal Goals established to promote Patient Goals: \"To get out of here so I can do what I want to do\":  Grooming: independent standing at sink; Bathing: supervision; Upper Body Dressing independent; Lower Body Dressing: min assist; Toileting: min assist; Patient will increase standing tolerance to 15 minutes during ADL task to decrease assistance level and decrease fall risk; Patient will tolerate 15 minutes of UE ROM/strengthening to increase general activity tolerance and performance in ADLS/IADLS; Patient will improve functional activity tolerance to 15 minutes of sustained functional tasks to increase participation in basic self-care and decrease assistance level;  Patient will be able to to verbalize understanding and perform energy conservation/proper body mechanics during ADLS and functional mobility at least 75% of the time with minimal cueing to decrease signs of fatigue and increase stamina to return to prior level of function; Patient will increase dynamic sitting balance to good to improve the ability to sit at edge of bed or on a chair for ADLS;  Patient will increase static/dynamic standing balance to fair+ to improve postural stability and decrease fall risk during standing ADLS and transfers. Pt will score >/= 20/24 on AM-PAC Daily Activity Inpatient scale to promote safe independence with ADLs and functional mobility.   Plan   Treatment Interventions ADL retraining;Functional transfer training;UE strengthening/ROM;Endurance training;Patient/family training;Equipment evaluation/education;Energy conservation;Activityengagement   Goal Expiration Date 08/06/25   OT Frequency 3-5x/wk   Discharge Recommendation   Rehab Resource Intensity Level, OT II (Moderate Resource Intensity)   AM-PAC Daily Activity Inpatient   Lower Body Dressing 2   Bathing 2   Toileting 2   Upper Body Dressing 3   Grooming 3   Eating 4   Daily Activity Raw Score 16   Daily " Activity Standardized Score (Calc for Raw Score >=11) 35.96   AM-PAC Applied Cognition Inpatient   Following a Speech/Presentation 4   Understanding Ordinary Conversation 4   Taking Medications 3   Remembering Where Things Are Placed or Put Away 3   Remembering List of 4-5 Errands 3   Taking Care of Complicated Tasks 3   Applied Cognition Raw Score 20   Applied Cognition Standardized Score 41.76   Additional Treatment Session   Start Time 1005   End Time 1015   Treatment Assessment S: Patient was pleasant and cooperative throughout the session today. O: Sit to stand transfer from chair to RW using armrests with supervision. Pt ambulated from bedroom, through hallway, and back to bedroom using RW with supervision and occasional physical cues to redirect RW from hitting doorway of room and nursing carts in hallway. Stand to sit transfer from RW to chair using armrests with supervision. Pt seated in chair with all needs within reach at end of session. A: Patient seen for OT treatment this PM. Patient currently requires supervision during functional transfers and mobility with RW. Patient also requires physical cues to redirect RW from coming in contact with items during ambulation. The patient is limited by impaired vision, decreased strength, decreased endurance, and decreased ADL status. P: Level II- Moderate Resource Intensity.   End of Consult   Education Provided Yes   Patient Position at End of Consult Bedside chair;Bed/Chair alarm activated;All needs within reach   Nurse Communication Nurse aware of consult   Licensure   [place] License Number  Bandar Elisabeth [id number]

## 2025-07-23 NOTE — PROGRESS NOTES
Progress Note - Hospitalist   Name: Naresh Carpio 65 y.o. male I MRN: [id number]  Unit/Bed#: [address] [place] [address] I Date of Admission: 7/20/2025   Date of Service: 7/23/2025 I Hospital Day: 2    Assessment & Plan  Encephalopathy  Toxic metabolic encephalopathy secondary to missed dialysis sessions  Status post urgent HD yesterday 7/20, plan for an additional suction with extra ultrafiltration tomorrow 7/22  Today seems to be alert and oriented to person and place and time with limitations. He is a little bit confused, called his sister asked to go home, then reported to me that he is going to rehab where he came from . Unable to recall rehab center name.. patient has history of anxiety disorder which could be affecting him.     Will continue to re orient and discuss with him.    ESRD (end stage renal disease) ([facility])  Lab Results   Component Value Date    EGFR 10 07/23/2025    EGFR 9 07/22/2025    EGFR 12 07/21/2025    CREATININE 5.06 (H) 07/23/2025    CREATININE 5.76 (H) 07/22/2025    CREATININE 4.44 (H) 07/21/2025   On dialysis HD   Appreciate nephrology recommendations   continue home binders    Neuropathy  On gabapentin 100 mg Monday/Wednesday/Friday prior to arrival  Patient stating it is not working, will increase to 100 mg daily  Acute respiratory failure with hypoxia ([facility])  Required 3 L on admission; now down to 2  Suspect this is secondary to volume overload in the setting of missed dialysis sessions  Wean and titrate oxygen per protocol.  Essential hypertension  Continue home metoprolol, nifedipine, Lasix  Nephrology following ; plan to increase nefidipine if BP remains persistently elevated    PAD (peripheral artery disease) ([facility])  No acute concerns from the standpoint  Chronic venous stasis changes on the legs bilaterally  Continue home aspirin and statin  Paroxysmal atrial fibrillation ([facility])  Continue metoprolol and nifedipine for rate control  Not on any anticoagulants at home  Heparin subcu for DVT  prophylaxis  Anemia in ESRD (end-stage renal disease)  ([facility])  Chronic anemia at baseline  Monitor  Chronic kidney disease-mineral and bone disorder  Continue home binders  Hyponatremia      VTE Pharmacologic Prophylaxis: VTE Score: 3 Moderate Risk (Score 3-4) - Pharmacological DVT Prophylaxis Ordered: heparin.    Mobility:   Basic Mobility Inpatient Raw Score: 17  JH-HLM Goal: 5: Stand one or more mins  JH-HLM Achieved: 7: Walk 25 feet or more  JH-HLM Goal achieved. Continue to encourage appropriate mobility.    Patient Centered Rounds: I performed bedside rounds with nursing staff today.   Discussions with Specialists or Other Care Team Provider: nephrology     Education and Discussions with Family / Patient: Updated  (sister) via phone.    Current Length of Stay: 2 day(s)  Current Patient Status: Inpatient   Certification Statement: The patient will continue to require additional inpatient hospital stay due to ESRD on dialysis, encephalopathy   Discharge Plan: Anticipate discharge in 24-48 hrs to rehab facility.    Code Status: Level 1 - Full Code    Subjective   Patient seen today at bedside.  He was talking with his sister on the phone.  Sister endorsed that the patient was asking her to pick him up and that he is going home.  When discussing with the patient reported that he knows that he is going back to rehab and is asking to be discharged.  Had a lengthy discussion with the patient regarding the importance of staying in the hospital to treat accelerated hypertension and end-stage renal disease and spit this during the next 24 hours.  Patient endorsed understanding.  He is alert and oriented to person and place with limitations.  Unable to recall the name of the rehab center that he was in recently.     Objective :  Temp:  [97.3 °F (36.3 °C)-98.7 °F (37.1 °C)] 98 °F (36.7 °C)  HR:  [56-74] 74  BP: (112-206)/(52-79) 206/79  Resp:  [15-20] 20  SpO2:  [94 %-99 %] 96 %  O2 Device: None (Room  air)    Body mass index is 34.91 kg/m².     Input and Output Summary (last 24 hours):     Intake/Output Summary (Last 24 hours) at 7/23/2025 0920  Last data filed at 7/22/2025 1740  Gross per 24 hour   Intake 500 ml   Output 9000 ml   Net -8500 ml       Physical Exam  Vitals and nursing note reviewed.   Constitutional:       General: He is not in acute distress.     Appearance: Normal appearance.   HENT:      Head: Normocephalic and atraumatic.      Mouth/Throat:      Mouth: Mucous membranes are moist.     Eyes:      Conjunctiva/sclera: Conjunctivae normal.      Pupils: Pupils are equal, round, and reactive to light.       Cardiovascular:      Rate and Rhythm: Normal rate.      Pulses: Normal pulses.           Carotid pulses are 2+ on the right side and 2+ on the left side.       Radial pulses are 2+ on the right side and 2+ on the left side.      Heart sounds: S1 normal and S2 normal. Murmur heard.   Pulmonary:      Effort: No tachypnea, bradypnea or accessory muscle usage.      Breath sounds: Normal breath sounds and air entry. No decreased breath sounds, wheezing, rhonchi or rales.   Abdominal:      General: Abdomen is flat. There is no distension.      Palpations: Abdomen is soft.     Musculoskeletal:      Right lower leg: No edema.      Left lower leg: No edema.     Neurological:      General: No focal deficit present.      Mental Status: He is alert.      GCS: GCS eye subscore is 4. GCS verbal subscore is 5. GCS motor subscore is 6.     Psychiatric:         Attention and Perception: Attention normal.         Mood and Affect: Mood is anxious.         Behavior: Behavior is cooperative.           Lines/Drains:  Lines/Drains/Airways       Active Status       Name Placement date Placement time Site Days    HD Permanent Double Catheter 08/30/23  --  Internal jugular  693                            Lab Results: I have reviewed the following results:   Results from last 7 days   Lab Units 07/23/25  0436   WBC  Thousand/uL 5.49   HEMOGLOBIN g/dL 10.5*   HEMATOCRIT % 34.4*   PLATELETS Thousands/uL 217   SEGS PCT % 62   LYMPHO PCT % 20   MONO PCT % 12   EOS PCT % 5     Results from last 7 days   Lab Units 07/23/25  0436 07/21/25  0601 07/20/25  0354   SODIUM mmol/L 131*   < > 133*   POTASSIUM mmol/L 4.7   < > 5.4*   CHLORIDE mmol/L 96   < > 97   CO2 mmol/L 29   < > 27   BUN mg/dL 31*   < > 37*   CREATININE mg/dL 5.06*   < > 5.61*   ANION GAP mmol/L 6   < > 9   CALCIUM mg/dL 8.9   < > 8.6   ALBUMIN g/dL  --   --  3.4*   TOTAL BILIRUBIN mg/dL  --   --  0.45   ALK PHOS U/L  --   --  89   ALT U/L  --   --  10   AST U/L  --   --  13   GLUCOSE RANDOM mg/dL 108   < > 104    < > = values in this interval not displayed.         Results from last 7 days   Lab Units 07/20/25  0348   POC GLUCOSE mg/dl 108               Recent Cultures (last 7 days):         Imaging Results Review: No pertinent imaging studies reviewed.  Other Study Results Review: No additional pertinent studies reviewed.    Last 24 Hours Medication List:     Current Facility-Administered Medications:     acetaminophen (TYLENOL) tablet 650 mg, Q6H PRN    allopurinol (ZYLOPRIM) tablet 100 mg, Daily    aspirin chewable tablet 81 mg, Daily    atorvastatin (LIPITOR) tablet 80 mg, Daily With Dinner    famotidine (PEPCID) tablet 20 mg, HS    furosemide (LASIX) tablet 20 mg, BID (diuretic)    gabapentin (NEURONTIN) capsule 100 mg, Daily    heparin (porcine) subcutaneous injection 5,000 Units, Q8H KEMAL    HYDROmorphone HCl (DILAUDID) injection 0.2 mg, Q4H PRN    ipratropium-albuterol (DUO-NEB) 0.5-2.5 mg/3 mL inhalation solution 3 mL, 4x Daily    lidocaine (LIDODERM) 5 % patch 1 patch, Daily    metoprolol succinate (TOPROL-XL) 24 hr tablet 25 mg, Q12H KEMAL    NIFEdipine (PROCARDIA XL) 24 hr tablet 30 mg, After Dinner    OLANZapine (ZyPREXA) IM injection 5 mg, Once    ondansetron (ZOFRAN) injection 4 mg, Q6H PRN    oxyCODONE (ROXICODONE) split tablet 2.5 mg, Q8H PRN    senna  (SENOKOT) tablet 8.6 mg, Daily With Lunch    sevelamer (RENAGEL) tablet 1,600 mg, TID With Meals    Administrative Statements   Today, Patient Was Seen By: Nirav Bruce MD  I have spent a total time of 30 minutes in caring for this patient on the day of the visit/encounter including Diagnostic results, Prognosis, Risks and benefits of tx options, and Instructions for management.    **Please Note: This note may have been constructed using a voice recognition system.**

## 2025-07-23 NOTE — PHYSICAL THERAPY NOTE
PT TREATMENT       07/23/25 1536   PT Last Visit   PT Visit Date 07/23/25   Note Type   Note Type Treatment   Pain Assessment   Pain Assessment Tool 0-10   Pain Score 4   Pain Location/Orientation Orientation: Lower;Location: Back   Pain Onset/Description Onset: Ongoing;Descriptor: Sore   Effect of Pain on Daily Activities limits mobility/activity tolerance   Patient's Stated Pain Goal No pain   Hospital Pain Intervention(s) Repositioned;Ambulation/increased activity   Multiple Pain Sites No   Restrictions/Precautions   Braces or Orthoses LSO  (does not have at bedside (left at facility) ; MD gave OK to mobilize without brace)   Other Precautions Contact/isolation;Bed Alarm;Chair Alarm;Fall Risk;Pain;Cognitive   General   Chart Reviewed Yes   Family/Caregiver Present No   Cognition   Arousal/Participation Cooperative   Orientation Level Oriented to person;Oriented to place;Oriented to situation;Disoriented to time   Following Commands Follows multistep commands with increased time or repetition   Subjective   Subjective Pt agreeable to PT session this afternoon   Bed Mobility   Supine to Sit Unable to assess   Sit to Supine Unable to assess   Additional Comments Received sitting in bedside chair   Transfers   Sit to Stand 5  Supervision   Additional items Increased time required;Armrests   Stand to Sit 5  Supervision   Additional items Increased time required;Armrests   Ambulation/Elevation   Gait pattern Foward flexed;Short stride;Step through pattern  (decreased gait speed)   Gait Assistance 5  Supervision  (close supervision)   Additional items Assist x 1;Verbal cues   Assistive Device Rolling walker   Distance 200 feet with change of direction   Stair Management Assistance Not tested   Balance   Static Sitting Fair +   Dynamic Sitting Fair   Static Standing Fair   Dynamic Standing Fair -   Ambulatory Fair -  (RW)   Activity Tolerance   Activity Tolerance Patient tolerated treatment well   Nurse Made Aware yes  RN Shannen   Assessment   Prognosis Good   Problem List Decreased strength;Decreased endurance;Impaired balance;Decreased mobility;Pain;Decreased skin integrity   Assessment Pt seen for PT session this afternoon. Pt making progress towards IP PT goals. Able to progress to ambulate x increased distance with supervision using RW. Continues to require intermittent verbal cues for hand placement + to maintain COG within GARO of walker. Reviewed spinal precautions with pt again - pt got 0/3 spinal precautions/poor recall from review during yesterday's session. Repositioned for comfort with all needs within reach. Continue to recommend level 3 minimum resource intensity with increased level of assist.  The patient's [facility] Basic Mobility Inpatient Short Form Raw Score is 18. A Raw score of greater than 16 suggests the patient may benefit from discharge to home. Please also refer to the recommendation of the Physical Therapist for safe discharge planning.     Goals   Patient Goals to leave the hospital and return home   Plan   Treatment/Interventions LE strengthening/ROM;Functional transfer training;Therapeutic exercise;Endurance training;Gait training;Spoke to nursing   PT Frequency 3-5x/wk   Discharge Recommendation   Rehab Resource Intensity Level, PT III (Minimum Resource Intensity)   Additional Comments Pt admit from STR facility - pt reports he is there until he can get eye surgery due to difficulty with vision/unable to care for himself at home. Pt with improved functional mobility compared to prior admission assessed at level 3. Pt would require increased level of assist to return home. If appropriate level of assist is not available may need to consider alternative d/c options. Will benefit from continued skilled PT services   [facility] Basic Mobility Inpatient   Turning in Flat Bed Without Bedrails 4   Lying on Back to Sitting on Edge of Flat Bed Without Bedrails 3   Moving Bed to Chair 3   Standing Up From Chair Using  Arms 3   Walk in Room 3   Climb 3-5 Stairs With Railing 2   Basic Mobility Inpatient Raw Score 18   Basic Mobility Standardized Score 41.05   [facility] Highest Level Of Mobility   -HLM Goal 6: Walk 10 steps or more   -HLM Achieved 7: Walk 25 feet or more   Education   Education Provided Mobility training;Assistive device   Patient Reinforcement needed   End of Consult   Patient Position at End of Consult Bedside chair;Bed/Chair alarm activated;All needs within reach   Licensure   [place] License Number  Lizeth Gill [id number]

## 2025-07-23 NOTE — ASSESSMENT & PLAN NOTE
BP elevated significantly today, pain could be contributing continue current medications.    Home Rx: Nifedipine 30 mg daily, Metoprolol succ 25 mg BID?, furosemide 20 mg twice a day.  Current continue current medication if it is persistently elevated may consider increasing the dose of nifedipine to 60 mg daily

## 2025-07-24 ENCOUNTER — APPOINTMENT (INPATIENT)
Dept: DIALYSIS | Facility: HOSPITAL | Age: 65
DRG: 640 | End: 2025-07-24
Attending: INTERNAL MEDICINE
Payer: MEDICARE

## 2025-07-24 VITALS
TEMPERATURE: 97.8 F | HEART RATE: 66 BPM | HEIGHT: 66 IN | OXYGEN SATURATION: 97 % | SYSTOLIC BLOOD PRESSURE: 152 MMHG | DIASTOLIC BLOOD PRESSURE: 72 MMHG | WEIGHT: 216.27 LBS | RESPIRATION RATE: 16 BRPM | BODY MASS INDEX: 34.76 KG/M2

## 2025-07-24 PROBLEM — J96.01 ACUTE RESPIRATORY FAILURE WITH HYPOXIA (HCC): Status: RESOLVED | Noted: 2024-11-19 | Resolved: 2025-07-24

## 2025-07-24 PROBLEM — G93.40 ENCEPHALOPATHY: Status: RESOLVED | Noted: 2024-10-08 | Resolved: 2025-07-24

## 2025-07-24 LAB
ANION GAP SERPL CALCULATED.3IONS-SCNC: 8 MMOL/L (ref 4–13)
BASOPHILS # BLD AUTO: 0.03 THOUSANDS/ÂΜL (ref 0–0.1)
BASOPHILS NFR BLD AUTO: 0 % (ref 0–1)
BUN SERPL-MCNC: 44 MG/DL (ref 5–25)
CALCIUM SERPL-MCNC: 9.1 MG/DL (ref 8.4–10.2)
CHLORIDE SERPL-SCNC: 95 MMOL/L (ref 96–108)
CO2 SERPL-SCNC: 28 MMOL/L (ref 21–32)
CREAT SERPL-MCNC: 6.45 MG/DL (ref 0.6–1.3)
EOSINOPHIL # BLD AUTO: 0.25 THOUSAND/ÂΜL (ref 0–0.61)
EOSINOPHIL NFR BLD AUTO: 3 % (ref 0–6)
ERYTHROCYTE [DISTWIDTH] IN BLOOD BY AUTOMATED COUNT: 15.3 % (ref 11.6–15.1)
GFR SERPL CREATININE-BSD FRML MDRD: 8 ML/MIN/1.73SQ M
GLUCOSE SERPL-MCNC: 92 MG/DL (ref 65–140)
HCT VFR BLD AUTO: 34.1 % (ref 36.5–49.3)
HGB BLD-MCNC: 10.4 G/DL (ref 12–17)
IMM GRANULOCYTES # BLD AUTO: 0.02 THOUSAND/UL (ref 0–0.2)
IMM GRANULOCYTES NFR BLD AUTO: 0 % (ref 0–2)
LYMPHOCYTES # BLD AUTO: 1.03 THOUSANDS/ÂΜL (ref 0.6–4.47)
LYMPHOCYTES NFR BLD AUTO: 13 % (ref 14–44)
MAGNESIUM SERPL-MCNC: 2.1 MG/DL (ref 1.9–2.7)
MCH RBC QN AUTO: 28 PG (ref 26.8–34.3)
MCHC RBC AUTO-ENTMCNC: 30.5 G/DL (ref 31.4–37.4)
MCV RBC AUTO: 92 FL (ref 82–98)
MONOCYTES # BLD AUTO: 0.82 THOUSAND/ÂΜL (ref 0.17–1.22)
MONOCYTES NFR BLD AUTO: 10 % (ref 4–12)
NEUTROPHILS # BLD AUTO: 6.03 THOUSANDS/ÂΜL (ref 1.85–7.62)
NEUTS SEG NFR BLD AUTO: 74 % (ref 43–75)
NRBC BLD AUTO-RTO: 0 /100 WBCS
PHOSPHATE SERPL-MCNC: 3.6 MG/DL (ref 2.3–4.1)
PLATELET # BLD AUTO: 232 THOUSANDS/UL (ref 149–390)
PMV BLD AUTO: 9.5 FL (ref 8.9–12.7)
POTASSIUM SERPL-SCNC: 5.9 MMOL/L (ref 3.5–5.3)
RBC # BLD AUTO: 3.71 MILLION/UL (ref 3.88–5.62)
SODIUM SERPL-SCNC: 131 MMOL/L (ref 135–147)
WBC # BLD AUTO: 8.18 THOUSAND/UL (ref 4.31–10.16)

## 2025-07-24 PROCEDURE — 83735 ASSAY OF MAGNESIUM: CPT

## 2025-07-24 PROCEDURE — 99239 HOSP IP/OBS DSCHRG MGMT >30: CPT

## 2025-07-24 PROCEDURE — 90935 HEMODIALYSIS ONE EVALUATION: CPT | Performed by: INTERNAL MEDICINE

## 2025-07-24 PROCEDURE — 80048 BASIC METABOLIC PNL TOTAL CA: CPT

## 2025-07-24 PROCEDURE — 84100 ASSAY OF PHOSPHORUS: CPT

## 2025-07-24 PROCEDURE — 85025 COMPLETE CBC W/AUTO DIFF WBC: CPT

## 2025-07-24 RX ADMIN — SEVELAMER HYDROCHLORIDE 1600 MG: 800 TABLET ORAL at 07:32

## 2025-07-24 RX ADMIN — METOPROLOL SUCCINATE 25 MG: 25 TABLET, EXTENDED RELEASE ORAL at 12:36

## 2025-07-24 RX ADMIN — SENNOSIDES 8.6 MG: 8.6 TABLET, FILM COATED ORAL at 12:36

## 2025-07-24 RX ADMIN — HEPARIN SODIUM 5000 UNITS: 5000 INJECTION INTRAVENOUS; SUBCUTANEOUS at 06:28

## 2025-07-24 RX ADMIN — ACETAMINOPHEN 650 MG: 325 TABLET, FILM COATED ORAL at 04:38

## 2025-07-24 RX ADMIN — ALLOPURINOL 100 MG: 100 TABLET ORAL at 12:35

## 2025-07-24 RX ADMIN — GABAPENTIN 100 MG: 100 CAPSULE ORAL at 12:36

## 2025-07-24 RX ADMIN — HYDROMORPHONE HYDROCHLORIDE 0.2 MG: 0.2 INJECTION, SOLUTION INTRAMUSCULAR; INTRAVENOUS; SUBCUTANEOUS at 01:41

## 2025-07-24 RX ADMIN — HYDROMORPHONE HYDROCHLORIDE 0.2 MG: 0.2 INJECTION, SOLUTION INTRAMUSCULAR; INTRAVENOUS; SUBCUTANEOUS at 09:24

## 2025-07-24 RX ADMIN — HEPARIN SODIUM 5000 UNITS: 5000 INJECTION INTRAVENOUS; SUBCUTANEOUS at 13:34

## 2025-07-24 RX ADMIN — SEVELAMER HYDROCHLORIDE 1600 MG: 800 TABLET ORAL at 12:36

## 2025-07-24 RX ADMIN — Medication 2.5 MG: at 07:32

## 2025-07-24 RX ADMIN — FUROSEMIDE 20 MG: 20 TABLET ORAL at 07:32

## 2025-07-24 RX ADMIN — ASPIRIN 81 MG: 81 TABLET, CHEWABLE ORAL at 12:36

## 2025-07-24 NOTE — CASE MANAGEMENT
Case Management Progress Note    Patient name Naresh Carpio  Location 4 [place] [address]-* MRN [id number]  : 1960 Date 2025       LOS (days): 3  Geometric Mean LOS (GMLOS) (days): 4.4  Days to GMLOS:1.2        OBJECTIVE:        Current admission status: Inpatient  Preferred Pharmacy:   [id number] - [place], [place] - [address]  [address]  [facility] [address]  Phone: [phone] Fax: [phone]    [id number] - [place], [place] - [address]  [address] [address]  Phone: [phone] Fax: [phone]    Primary Care Provider: Jeffrey Jackson    Primary Insurance: MEDICARE  Secondary Insurance:     PROGRESS NOTE:    CURTIS received response from Susanne Zhong who confirmed that facility is willing to have patient remain long-term with a Medicaid application.  Farheen offered to speak with patient's sister Diana and CURTIS called Diana to provide her with Farheen's contact information.  Per Farheen, Diana can also speak with new  Robel Bridges.

## 2025-07-24 NOTE — [place] UNIVERSAL TRANSFER FORM
"[place] UN[place] FOR[place]  (ALL ITEMS MUST BE COMPLETED)    1. TRANSFER FROM: [facility]      TRANSFER TO: ***    2. DATE OF TRANSFER: 7/24/2025                        TIME OF TRANSFER: ***    3. PATIENT NAME: Naresh Carpio D      YOB: 1960                             GENDER: male    4. LANGUAGE:   English    5. PHYSICIAN NAME:  Nirav Bruce MD                   PHONE: [phone]    6. CODE STATUS: Level 1 - Full Code        Out of Hospital DNR Attached: {Yes/No:20651}    7. :                                      :  Extended Emergency Contact Information  Primary Emergency Contact: Alicja Carpio  Mobile Phone: [phone]  Relation: Sister  Secondary Emergency Contact: Mercedes Resendez  Mobile Phone: [phone]  Relation: Friend           Health Care Representative/Proxy:  {Yes/No:20651}           Legal Guardian:  {Yes/No:20651}             NAME OF:           HEALTH CARE REPRESENTATIVE/PROXY:                                         OR           LEGAL GUARDIAN, IF NOT :                                               PHONE:  (Day)           (Night)                        (Cell)    8. REASON FOR TRANSFER: (Must include brief medical history and recent changes in physical function or cognition.) ***            V/S: /72 (BP Location: Right arm)   Pulse 66   Temp 97.8 °F (36.6 °C)   Resp 16   Ht 5' 6\" (1.676 m)   Wt 98.1 kg (216 lb 4.3 oz)   SpO2 97%   BMI 34.91 kg/m²           PAIN: {Pain:20652}    9. PRIMARY DIAGNOSIS: Encephalopathy      Secondary Diagnosis:         Pacemaker: {Yes/No:20651}      Internal Defib: {Yes/No:20651}          Mental Health Diagnosis (if Applicable):    10. RESTRAINTS: {Restraints:20654}     11. RESPIRATORY NEEDS: {Respiratory Needs:20655}    12. ISOLATION/PRECAUTION: {Isolation Precautions:20657}    13. ALLERGY: Patient has no known allergies.    14. SENSORY:       " {Sensory:02102}    15. SKIN CONDITION: {Skin Cond:23723}    16. DIET: {Diet:20783}    17. IV ACCESS: {IV Access:20887}    18. PERSONAL ITEMS SENT WITH PATIENT: {Personal Items:20888}    19. ATTACHED DOCUMENTS: MUST ATTACH CURRENT MEDICATION INFORMATION {Attached Documents:20891}    20. AT RISK ALERTS:{Risks:20892}        HARM TO: {Harm To:20893}    21. WEIGHT BEARING STATUS:         Left Leg: {None/Limited/Full:67509}        Right Leg: {None/Limited/Full:20896}    22. MENTAL STATUS:{Mental Status:20897}    23. FUNCTION:        Walk: {Self/With Help/Not Able:20899}        Transfer: {Self/With Help/Not Able:20899}        Toilet: {Self/With Help/Not Able:20899}        Feed: {Self/With Help/Not Able:20899}    24. IMMUNIZATIONS/SCREENING:     Immunization History   Administered Date(s) Administered    COVID-19 J&J ([employer]) vaccine 0.5 mL 05/07/2021    Pneumococcal Polysaccharide PPV23 07/14/2022    Tdap 10/08/2024       25. BOWEL: {Bowel:20900}    26. BLADDER: {Bladder:20901}    27. SENDING FACILITY CONTACT: ***                  Title: ***        Unit: ***        Phone: ***          REC'G FACILITY CONTACT (if known):        Title:        Unit:         Phone:         FORM PREFILLED BY (if applicable)       Title:       Unit:        Phone:         FORM COMPLETED BY Dolores Dang RN      Title: ***      Phone: ***

## 2025-07-24 NOTE — PROGRESS NOTES
Progress Note - Nephrology   Name: Naresh Carpio 65 y.o. male I MRN: [id number]  Unit/Bed#: 4 [place] [address] I Date of Admission: 7/20/2025   Date of Service: 7/24/2025 I Hospital Day: 3    Assessment & Plan  ESRD (end stage renal disease) ([facility])  [facility] TTS  Access: R IJ permcath.   EDW 89.5 kg.   Status post urgent HD on 7/20 for fluid overload and hyperkalemia  Status post intermittent HD on 7/22 for 140 minutes and  2.3 lts fluid removed.  Still with lower extremity edema but otherwise no signs of fluid overload.   Hemodialysis treatment today, patient was seen and examined on hemodialysis treatment, blood pressure is stable, using 2K bath and plan for ultrafiltration about 2.5 to 3 L as tolerated.  Hopefully patient stays for full treatment, challenging target weight    Encephalopathy  Mental status has improved and is back to baseline, oriented x 3 currently.  Possibility of uremic encephalopathy from missed couple of dialysis treatment cannot be ruled out.    Acute respiratory failure with hypoxia ([facility])  Chest x-ray with vascular congestion.  Status post urgent HD on 7/20 and volume status improving currently on oxygen nasal cannula.   Hemodialysis today with ultrafiltration    Essential hypertension  BP acceptable, continue UF with HD, continue current antihypertensive medications  Home Rx: Nifedipine 30 mg daily, Metoprolol succ 25 mg BID?, furosemide 20 mg twice a day.  Current continue current medication if it is persistently elevated may consider increasing the dose of nifedipine to 60 mg daily    Anemia in ESRD (end-stage renal disease)  ([facility])  Hemoglobin currently at target range at 10.5 g/dL, continue to monitor.  He is on Mircera in the outpatient setting.    Chronic kidney disease-mineral and bone disorder  Home Rx: Sevelamer 1600 mg TID.   Check phos.  Last phosphorus level was 3.3.   Continue sevelamer.  Recommend renal diet    PAD (peripheral artery disease) ([facility])  -Continue management  per primary team on home aspirin and statins  Paroxysmal atrial fibrillation (HCC)  -On metoprolol and nifedipine.  Management per primary team not on anticoagulants at home  Neuropathy  -Continue management per primary team.  Was restarted on gabapentin 100 mg daily on 7/22, monitor response, patient mentions gabapentin does not work.  Would recommend outpatient pain management  Hyponatremia  -Sodium slightly low at 131 meq/L, recommend fluid restriction    I have reviewed the nephrology recommendations including plan for hemodialysis treatment today, with primary team, and we are in agreement with renal plan including the information outlined above.     Subjective   Complain of   pain both feet , more on the left thigh and back.  No chest pain or shortness of breath.    Objective :  Temp:  [97.5 °F (36.4 °C)-98 °F (36.7 °C)] 97.6 °F (36.4 °C)  HR:  [60-69] 64  BP: (100-172)/(61-89) 155/89  Resp:  [16-19] 16  SpO2:  [96 %-100 %] 97 %  O2 Device: None (Room air)    Current Weight: Weight - Scale: 98.1 kg (216 lb 4.3 oz)  First Weight: Weight - Scale: 98.1 kg (216 lb 4.3 oz)  I/O         07/22 0701  07/23 0700 07/23 0701  07/24 0700 07/24 0701  07/25 0700    P.O.  300     I.V. (mL/kg) 500 (5.1)  200 (2)    Total Intake(mL/kg) 500 (5.1) 300 (3.1) 200 (2)    Urine (mL/kg/hr) 150 (0.1)      Other 8850      Total Output 9000      Net -8500 +300 +200           Unmeasured Urine Occurrence 2 x 1 x     Unmeasured Stool Occurrence 1 x            Physical Exam  General:  Ill looking, awake.  Eyes: Conjunctivae pink,  Sclera anicteric  ENT: lips and mucous membranes moist  Neck: supple   Chest: Clear to Auscultation both lungs,  no crackles, ronchus or wheezing.  CVS: S1 & S2 present, normal rate, regular rhythm, no murmur.  Abdomen: soft, non-tender, non-distended, Bowel sounds normoactive  Extremities: 1+ edema both legs  Skin: no rash  Neuro: awake, alert, oriented  Psych: Mood and affect appropriate    Medications:  Current  "Medications[1]      Lab Results: I have reviewed the following results:  Results from last 7 days   Lab Units 07/24/25  0849 07/23/25  0436 07/22/25  0443 07/21/25  0601 07/20/25  0354   WBC Thousand/uL 8.18 5.49  --  5.56 8.32   HEMOGLOBIN g/dL 10.4* 10.5*  --  10.1* 9.9*   HEMATOCRIT % 34.1* 34.4*  --  33.4* 31.9*   PLATELETS Thousands/uL 232 217  --  205 219   POTASSIUM mmol/L  --  4.7 5.1 4.3 5.4*   CHLORIDE mmol/L  --  96 96 97 97   CO2 mmol/L  --  29 28 29 27   BUN mg/dL  --  31* 33* 23 37*   CREATININE mg/dL  --  5.06* 5.76* 4.44* 5.61*   CALCIUM mg/dL  --  8.9 8.7 8.7 8.6   MAGNESIUM mg/dL  --  1.9  --  2.0  --    PHOSPHORUS mg/dL  --  3.3  --   --   --    ALBUMIN g/dL  --   --   --   --  3.4*       Administrative Statements     Portions of the record may have been created with voice recognition software. Occasional wrong word or \"sound a like\" substitutions may have occurred due to the inherent limitations of voice recognition software. Read the chart carefully and recognize, using context, where substitutions have occurred.If you have any questions, please contact the dictating provider.       [1]   Current Facility-Administered Medications:     acetaminophen (TYLENOL) tablet 650 mg, 650 mg, Oral, Q6H PRN, Gennaro Zelaya DO, 650 mg at 07/24/25 0438    allopurinol (ZYLOPRIM) tablet 100 mg, 100 mg, Oral, Daily, Gennaro Zelaya DO, 100 mg at 07/23/25 0806    aspirin chewable tablet 81 mg, 81 mg, Oral, Daily, Gennaro Zelaya DO, 81 mg at 07/23/25 0806    atorvastatin (LIPITOR) tablet 80 mg, 80 mg, Oral, Daily With Dinner, Gennaro Zelaya DO, 80 mg at 07/23/25 1635    famotidine (PEPCID) tablet 20 mg, 20 mg, Oral, HS, Gennaro Zelaya DO, 20 mg at 07/23/25 2109    furosemide (LASIX) tablet 20 mg, 20 mg, Oral, BID (diuretic), Gennaro Zelaya DO, 20 mg at 07/24/25 0732    gabapentin (NEURONTIN) capsule 100 mg, 100 mg, Oral, Daily, Gennaro Zelaya DO, 100 mg " at 07/23/25 0806    heparin (porcine) subcutaneous injection 5,000 Units, 5,000 Units, Subcutaneous, Q8H KEMAL, Gennaro Zelaya DO, 5,000 Units at 07/24/25 0628    HYDROmorphone HCl (DILAUDID) injection 0.2 mg, 0.2 mg, Intravenous, Q4H PRN, GRANT Rich, 0.2 mg at 07/24/25 0924    ipratropium-albuterol (DUO-NEB) 0.5-2.5 mg/3 mL inhalation solution 3 mL, 3 mL, Nebulization, Q6H PRN, Nirav Bruce MD    lidocaine (LIDODERM) 5 % patch 1 patch, 1 patch, Topical, Daily, GRANT Rich, 1 patch at 07/23/25 0806    melatonin tablet 3 mg, 3 mg, Oral, HS, Nirav Bruce MD, 3 mg at 07/23/25 2109    metoprolol succinate (TOPROL-XL) 24 hr tablet 25 mg, 25 mg, Oral, Q12H KEMAL, Gennaro Zelaya DO, 25 mg at 07/23/25 2109    NIFEdipine (PROCARDIA XL) 24 hr tablet 30 mg, 30 mg, Oral, After Dinner, Gennaro Zelaya DO, 30 mg at 07/23/25 1715    OLANZapine (ZyPREXA) IM injection 5 mg, 5 mg, Intramuscular, Once, Nirav Bruce MD    ondansetron (ZOFRAN) injection 4 mg, 4 mg, Intravenous, Q6H PRN, Gennaro Zelaya DO, 4 mg at 07/21/25 1202    oxyCODONE (ROXICODONE) split tablet 2.5 mg, 2.5 mg, Oral, Q8H PRN, Gennaro Zelaya DO, 2.5 mg at 07/24/25 0732    senna (SENOKOT) tablet 8.6 mg, 8.6 mg, Oral, Daily With Lunch, Gennaro Zelaya DO, 8.6 mg at 07/22/25 1302    sevelamer (RENAGEL) tablet 1,600 mg, 1,600 mg, Oral, TID With Meals, Gennaro Zelaya DO, 1,600 mg at 07/24/25 0732

## 2025-07-24 NOTE — [place] UNIVERSAL TRANSFER FORM
"[place] UN[place]ERS[place]SF[place] FOR[place]  (ALL ITEMS MUST BE COMPLETED)    1. TRANSFER FROM: [facility]      TRANSFER TO: eloy montgomery    2. DATE OF TRANSFER: 7/24/2025                        TIME OF TRANSFER: 15:30 pm     3. PATIENT NAME: Naresh Carpio D      YOB: 1960                             GENDER: male    4. LANGUAGE:   English    5. PHYSICIAN NAME:  Nirav Bruce MD                   PHONE: [phone]    6. CODE STATUS: Level 1 - Full Code        Out of Hospital DNR Attached: No    7. :                                      :  Extended Emergency Contact Information  Primary Emergency Contact: Alicja Carpio  Mobile Phone: [phone]  Relation: Sister  Secondary Emergency Contact: Mercedes Resendez  Mobile Phone: [phone]  Relation: Friend           Health Care Representative/Proxy:  Yes           Legal Guardian:  Yes             NAME OF:           HEALTH CARE REPRESENTATIVE/PROXY:                                         OR           LEGAL GUARDIAN, IF NOT :                                               PHONE:  (Day)           (Night)                        (Cell)    8. REASON FOR TRANSFER: (Must include brief medical history and recent changes in physical function or cognition.) f2  Missed dialysis            V/S: /72 (BP Location: Right arm)   Pulse 66   Temp 97.8 °F (36.6 °C)   Resp 16   Ht 5' 6\" (1.676 m)   Wt 98.1 kg (216 lb 4.3 oz)   SpO2 97%   BMI 34.91 kg/m²           PAIN: None    9. PRIMARY DIAGNOSIS: Encephalopathy      Secondary Diagnosis:         Pacemaker: No      Internal Defib: No          Mental Health Diagnosis (if Applicable):    10. RESTRAINTS: Yes (describe) f2     11. RESPIRATORY NEEDS: None    12. ISOLATION/PRECAUTION: None    13. ALLERGY: Patient has no known allergies.    14. SENSORY:       Vision Good, Hearing Good , and Speech Clear    15. SKIN CONDITION: No " Wounds    16. DIET: Regular    17. IV ACCESS: None    18. PERSONAL ITEMS SENT WITH PATIENT: Otherclothing    19. ATTACHED DOCUMENTS: MUST ATTACH CURRENT MEDICATION INFORMATION Face Sheet, MAR, and Discharge Summary    20. AT RISK ALERTS:None        HARM TO: N/A    21. WEIGHT BEARING STATUS:         Left Leg: Full        Right Leg: Full    22. MENTAL STATUS:Alert    23. FUNCTION:        Walk: With Help        Transfer: With Help        Toilet: With Help        Feed: With Help    24. IMMUNIZATIONS/SCREENING:     Immunization History   Administered Date(s) Administered    COVID-19 J&J ([employer]) vaccine 0.5 mL 05/07/2021    Pneumococcal Polysaccharide PPV23 07/14/2022    Tdap 10/08/2024       25. BOWEL: Continent    26. BLADDER: Continent    27. SENDING FACILITY CONTACT: raj lozoya Rn                   Title: rn        Unit: 4n         Phone: [phone]          REC'G FACILITY CONTACT (if known):        Title:        Unit:         Phone:         FORM PREFILLED BY (if applicable)       Title:       Unit:        Phone:         FORM COMPLETED BY Raj Dang RN      Title: sandi      Phone: [phone]

## 2025-07-24 NOTE — PLAN OF CARE
Problem: Prexisting or High Potential for Compromised Skin Integrity  Goal: Skin integrity is maintained or improved  Description: INTERVENTIONS:  - Identify patients at risk for skin breakdown  - Assess and monitor skin integrity including under and around medical devices   - Assess and monitor nutrition and hydration status  - Monitor labs  - Assess for incontinence   - Turn and reposition patient  - Assist with mobility/ambulation  - Relieve pressure over chente prominences   - Avoid friction and shearing  - Provide appropriate hygiene as needed including keeping skin clean and dry  - Evaluate need for skin moisturizer/barrier cream  - Collaborate with interdisciplinary team  - Patient/family teaching  - Consider wound care consult    Assess:  - Review Praful scale daily  - Clean and moisturize skin every day  - Inspect skin when repositioning, toileting, and assisting with ADLS  - Assess extremities for adequate circulation and sensation     Bed Management:  - Have minimal linens on bed & keep smooth, unwrinkled  - Change linens as needed when moist or perspiring  - Avoid sitting or lying in one position for more than 2 hours while in bed?Keep HOB at 45 degrees   - Toileting:  - Offer bedside commode  - Assess for incontinence every hour    Activity:  - Mobilize patient 12 times a day  - Encourage activity and walks on unit  - Encourage or provide ROM exercises   - Turn and reposition patient every 2 Hours  - Use appropriate equipment to lift or move patient in bed  - Instruct/ Assist with weight shifting every hour when out of bed in chair  - Consider limitation of chair time 2 hour intervals    Skin Care:  - Avoid use of baby powder, tape, friction and shearing, hot water or constrictive clothing  - Relieve pressure over bony prominences using mepalex   - Do not massage red bony areas  Outcome: Progressing     Problem: METABOLIC, FLUID AND ELECTROLYTES - ADULT  Goal: Electrolytes maintained within normal  limits  Description: INTERVENTIONS:  - Monitor labs and assess patient for signs and symptoms of electrolyte imbalances  - Administer electrolyte replacement as ordered  - Monitor response to electrolyte replacements, including repeat lab results as appropriate  - Instruct patient on fluid and nutrition as appropriate  Outcome: Progressing     Problem: RESPIRATORY - ADULT  Goal: Achieves optimal ventilation and oxygenation  Description: INTERVENTIONS:  - Assess for changes in respiratory status  - Assess for changes in mentation and behavior  - Position to facilitate oxygenation and minimize respiratory effort  - Oxygen administered by appropriate delivery if ordered  - Initiate smoking cessation education as indicated  - Encourage broncho-pulmonary hygiene including cough, deep breathe, Incentive Spirometry  - Assess the need for suctioning and aspirate as needed  - Assess and instruct to report SOB or any respiratory difficulty  - Respiratory Therapy support as indicated  Outcome: Progressing     Problem: PAIN - ADULT  Goal: Verbalizes/displays adequate comfort level or baseline comfort level  Description: Interventions:  - Encourage patient to monitor pain and request assistance  - Assess pain using appropriate pain scale  - Administer analgesics as ordered based on type and severity of pain and evaluate response  - Implement non-pharmacological measures as appropriate and evaluate response  - Consider cultural and social influences on pain and pain management  - Notify physician/advanced practitioner if interventions unsuccessful or patient reports new pain  - Educate patient/family on pain management process including their role and importance of  reporting pain   - Provide non-pharmacologic/complimentary pain relief interventions  Outcome: Progressing     Problem: SAFETY ADULT  Goal: Patient will remain free of falls  Description: INTERVENTIONS:  - Educate patient/family on patient safety including physical  limitations  - Instruct patient to call for assistance with activity   - Consider consulting OT/PT to assist with strengthening/mobility based on AM PAC & JH-HLM score  - Consult OT/PT to assist with strengthening/mobility   - Keep Call bell within reach  - Keep bed low and locked with side rails adjusted as appropriate  - Keep care items and personal belongings within reach  - Initiate and maintain comfort rounds  - Make Fall Risk Sign visible to staff  - Offer Toileting every 2 Hours, in advance of need  - Initiate/Maintain bed/chair alarm  - Obtain necessary fall risk management equipment: bracelet/socks   - Apply yellow socks and bracelet for high fall risk patients  - Consider moving patient to room near nurses station  Outcome: Progressing  Goal: Maintain or return to baseline ADL function  Description: INTERVENTIONS:  -  Assess patient's ability to carry out ADLs; assess patient's baseline for ADL function and identify physical deficits which impact ability to perform ADLs (bathing, care of mouth/teeth, toileting, grooming, dressing, etc.)  - Assess/evaluate cause of self-care deficits   - Assess range of motion  - Assess patient's mobility; develop plan if impaired  - Assess patient's need for assistive devices and provide as appropriate  - Encourage maximum independence but intervene and supervise when necessary  - Involve family in performance of ADLs  - Assess for home care needs following discharge   - Consider OT consult to assist with ADL evaluation and planning for discharge  - Provide patient education as appropriate  - Monitor functional capacity and physical performance, use of AM PAC & JH-HLM   - Monitor gait, balance and fatigue with ambulation    Outcome: Progressing  Goal: Maintains/Returns to pre admission functional level  Description: INTERVENTIONS:  - Perform AM-PAC 6 Click Basic Mobility/ Daily Activity assessment daily.  - Set and communicate daily mobility goal to care team and  patient/family/caregiver.   - Collaborate with rehabilitation services on mobility goals if consulted  - Perform Range of Motion 12 times a day.  - Reposition patient every 2 hours.  - Dangle patient 3 times a day  - Stand patient 3 times a day  - Ambulate patient 3 times a day  - Out of bed to chair 3 times a day   - Out of bed for meals 3 times a day  - Out of bed for toileting  - Record patient progress and toleration of activity level   Outcome: Progressing

## 2025-07-24 NOTE — ASSESSMENT & PLAN NOTE
[facility] TTS  Access: R IJ permcath.   EDW 89.5 kg.   Status post urgent HD on 7/20 for fluid overload and hyperkalemia  Status post intermittent HD on 7/22 for 140 minutes and  2.3 lts fluid removed.  Still with lower extremity edema but otherwise no signs of fluid overload.   Hemodialysis treatment today, patient was seen and examined on hemodialysis treatment, blood pressure is stable, using 2K bath and plan for ultrafiltration about 2.5 to 3 L as tolerated.  Hopefully patient stays for full treatment, challenging target weight

## 2025-07-24 NOTE — ASSESSMENT & PLAN NOTE
Serum K 5.9 today .     Will complete dialysis session today   Check BMP with next dialysis session

## 2025-07-24 NOTE — ASSESSMENT & PLAN NOTE
Lab Results   Component Value Date    EGFR 8 07/24/2025    EGFR 10 07/23/2025    EGFR 9 07/22/2025    CREATININE 6.45 (H) 07/24/2025    CREATININE 5.06 (H) 07/23/2025    CREATININE 5.76 (H) 07/22/2025   On dialysis HD   [facility] TTS  Access: R IJ permcath.   EDW 89.5 kg.

## 2025-07-24 NOTE — DISCHARGE SUMMARY
Discharge Summary - Hospitalist   Name: Naresh Carpio 65 y.o. male I MRN: [id number]  Unit/Bed#: [address] [place] [address] I Date of Admission: 7/20/2025   Date of Service: 7/24/2025 I Hospital Day: 3     Assessment & Plan  Encephalopathy (Resolved: 7/24/2025)  Toxic metabolic encephalopathy secondary to missed dialysis sessions  Status post urgent HD yesterday 7/20, plan for an additional suction with extra ultrafiltration tomorrow 7/22  Today seems to be alert and oriented to person and place and time with limitations. He is a little bit confused, called his sister asked to go home, then reported to me that he is going to rehab where he came from . Unable to recall rehab center name.. patient has history of anxiety disorder which could be affecting him.     Will continue to re orient and discuss with him.    ESRD (end stage renal disease) ([facility])  Lab Results   Component Value Date    EGFR 8 07/24/2025    EGFR 10 07/23/2025    EGFR 9 07/22/2025    CREATININE 6.45 (H) 07/24/2025    CREATININE 5.06 (H) 07/23/2025    CREATININE 5.76 (H) 07/22/2025   On dialysis HD   [facility] TTS  Access: R IJ permcath.   EDW 89.5 kg.     Neuropathy  On gabapentin 100 mg Monday/Wednesday/Friday prior to arrival  Patient stating it is not working, will increase to 100 mg daily  Acute respiratory failure with hypoxia ([facility]) (Resolved: 7/24/2025)  Required 3 L on admission; now down to 2  Suspect this is secondary to volume overload in the setting of missed dialysis sessions  Wean and titrate oxygen per protocol.  Essential hypertension  Continue home metoprolol, nifedipine, Lasix      PAD (peripheral artery disease) ([facility])  No acute concerns from the standpoint  Chronic venous stasis changes on the legs bilaterally  Continue home aspirin and statin  Paroxysmal atrial fibrillation ([facility])  Continue metoprolol and nifedipine for rate control  Not on any anticoagulants at home    Anemia in ESRD (end-stage renal disease)  ([facility])  Chronic anemia at  baseline  Monitor  Chronic kidney disease-mineral and bone disorder  Continue home binders  Hyponatremia  Check BMP with next dialysis session     Hyperkalemia  Serum K 5.9 today .     Will complete dialysis session today   Check BMP with next dialysis session      Medical Problems       Resolved Problems  Date Reviewed: 7/24/2025          Resolved    * (Principal) Encephalopathy 7/24/2025     Resolved by  Nirav Bruce MD    Acute respiratory failure with hypoxia (HCC) 7/24/2025     Resolved by  Nirav Bruce MD        Discharging Physician / Practitioner: Nirav Bruce MD  PCP: Jeffrey Jackson  Admission Date:   Admission Orders (From admission, onward)       Ordered        07/21/25 0957  INPATIENT ADMISSION  Once            07/20/25 0724  Place in Observation  Once                          Discharge Date: 07/24/25    Next Steps for Physician/AP Assuming Care:  Dialysis TTS   Check BMP in 3 days or with next dialysis session     Test Results Pending at Discharge (will require follow up):  None     Medication Changes for Discharge & Rationale:   None   See after visit summary for reconciled discharge medications provided to patient and/or family.     Consultations During Hospital Stay:  Nephrology     Procedures Performed:   CT head without contrast   Final Result      No evidence of acute intracranial process or significant interval change.      Chronic microangiopathy.                  Workstation performed: [id number]         XR chest 1 view portable   ED Interpretation   Pleural effusion, pulmonary edema      Final Result      Moderate pulmonary vascular congestion and small right pleural effusion.            Workstation performed: [id number]               Significant Findings / Test Results:   Results for orders placed or performed during the hospital encounter of 07/20/25   MRSA culture    Specimen: Nose; Nares   Result Value Ref Range    MRSA Culture Only       No Methicillin Resistant Staphlyococcus aureus  "(MRSA) isolated   CBC and differential   Result Value Ref Range    WBC 8.32 4.31 - 10.16 Thousand/uL    RBC 3.47 (L) 3.88 - 5.62 Million/uL    Hemoglobin 9.9 (L) 12.0 - 17.0 g/dL    Hematocrit 31.9 (L) 36.5 - 49.3 %    MCV 92 82 - 98 fL    MCH 28.5 26.8 - 34.3 pg    MCHC 31.0 (L) 31.4 - 37.4 g/dL    RDW 15.6 (H) 11.6 - 15.1 %    MPV 9.2 8.9 - 12.7 fL    Platelets 219 149 - 390 Thousands/uL    nRBC 0 /100 WBCs    Segmented % 76 (H) 43 - 75 %    Immature Grans % 0 0 - 2 %    Lymphocytes % 10 (L) 14 - 44 %    Monocytes % 12 4 - 12 %    Eosinophils Relative 2 0 - 6 %    Basophils Relative 0 0 - 1 %    Absolute Neutrophils 6.32 1.85 - 7.62 Thousands/µL    Absolute Immature Grans 0.02 0.00 - 0.20 Thousand/uL    Absolute Lymphocytes 0.81 0.60 - 4.47 Thousands/µL    Absolute Monocytes 0.99 0.17 - 1.22 Thousand/µL    Eosinophils Absolute 0.15 0.00 - 0.61 Thousand/µL    Basophils Absolute 0.03 0.00 - 0.10 Thousands/µL   Comprehensive metabolic panel   Result Value Ref Range    Sodium 133 (L) 135 - 147 mmol/L    Potassium 5.4 (H) 3.5 - 5.3 mmol/L    Chloride 97 96 - 108 mmol/L    CO2 27 21 - 32 mmol/L    ANION GAP 9 4 - 13 mmol/L    BUN 37 (H) 5 - 25 mg/dL    Creatinine 5.61 (H) 0.60 - 1.30 mg/dL    Glucose 104 65 - 140 mg/dL    Calcium 8.6 8.4 - 10.2 mg/dL    Corrected Calcium 9.1 8.3 - 10.1 mg/dL    AST 13 13 - 39 U/L    ALT 10 7 - 52 U/L    Alkaline Phosphatase 89 34 - 104 U/L    Total Protein 6.6 6.4 - 8.4 g/dL    Albumin 3.4 (L) 3.5 - 5.0 g/dL    Total Bilirubin 0.45 0.20 - 1.00 mg/dL    eGFR 9 ml/min/1.73sq m   HS Troponin 0hr (reflex protocol)   Result Value Ref Range    hs TnI 0hr 35 \"Refer to ACS Flowchart\"- see link ng/L   Blood gas, venous   Result Value Ref Range    pH, [place] 7.447 (H) 7.300 - 7.400    pCO2, [place] 41.3 (L) 42.0 - 50.0 mm Hg    pO2, [place] 45.4 (H) 35.0 - 45.0 mm Hg    HCO3, [place] 27.9 24 - 30 mmol/L    Base Excess, [place] 3.5 mmol/L    O2 Content, [place] 12.2 ml/dL    O2 HGB, VENOUS 79.0 60.0 - 80.0 %   Ammonia " "  Result Value Ref Range    Ammonia 28 18 - 72 umol/L   TSH, 3rd generation with Free T4 reflex   Result Value Ref Range    TSH 3RD GENERATION 1.948 0.450 - 4.500 uIU/mL   Rapid drug screen, urine   Result Value Ref Range    Amph/Meth UR Negative Negative    Barbiturate Ur Negative Negative    Benzodiazepine Urine Negative Negative    Cocaine Urine Negative Negative    Methadone Urine Negative Negative    Opiate Urine Negative Negative    PCP Ur Negative Negative    THC Urine Negative Negative    Oxycodone Urine Positive (A) Negative    Fentanyl Urine Negative Negative    HYDROCODONE URINE Negative Negative   UA w Reflex to Microscopic w Reflex to Culture    Specimen: Urine, Clean Catch   Result Value Ref Range    Color, UA Colorless     Clarity, UA Clear     Specific Gravity, UA <1.005 (L) 1.005 - 1.030    pH, UA 8.0 4.5, 5.0, 5.5, 6.0, 6.5, 7.0, 7.5, 8.0    Leukocytes, UA Trace (A) Negative    Nitrite, UA Negative Negative    Protein,  (2+) (A) Negative mg/dl    Glucose, UA 30 (3/100%) (A) Negative mg/dl    Ketones, UA Negative Negative mg/dl    Urobilinogen, UA <2.0 <2.0 mg/dl mg/dl    Bilirubin, UA Negative Negative    Occult Blood, UA Negative Negative   Ethanol   Result Value Ref Range    Ethanol Lvl <10 <10 mg/dL   Salicylate level   Result Value Ref Range    Salicylate Lvl <5 (L) 5 - 20 mg/dL   Acetaminophen level-If concentration is detectable, please discuss with medical  on call.   Result Value Ref Range    Acetaminophen Level <2 (L) 10 - 20 ug/mL   HS Troponin I 2hr   Result Value Ref Range    hs TnI 2hr 33 \"Refer to ACS Flowchart\"- see link ng/L    Delta 2hr hsTnI -2 <20 ng/L   HS Troponin I 4hr   Result Value Ref Range    hs TnI 4hr 40 \"Refer to ACS Flowchart\"- see link ng/L    Delta 4hr hsTnI 5 <20 ng/L   Urine Microscopic   Result Value Ref Range    RBC, UA 0-1 None Seen, 0-1, 1-2, 2-4, 0-5 /hpf    WBC, UA 2-4 None Seen, 0-1, 1-2, 0-5, 2-4 /hpf    Epithelial Cells Occasional None " Seen, Occasional /hpf    Bacteria, UA Occasional None Seen, Occasional /hpf    WBC Clumps Present (A) (none)   Basic metabolic panel   Result Value Ref Range    Sodium 135 135 - 147 mmol/L    Potassium 4.3 3.5 - 5.3 mmol/L    Chloride 97 96 - 108 mmol/L    CO2 29 21 - 32 mmol/L    ANION GAP 9 4 - 13 mmol/L    BUN 23 5 - 25 mg/dL    Creatinine 4.44 (H) 0.60 - 1.30 mg/dL    Glucose 78 65 - 140 mg/dL    Glucose, Fasting 78 65 - 99 mg/dL    Calcium 8.7 8.4 - 10.2 mg/dL    eGFR 12 ml/min/1.73sq m   Magnesium   Result Value Ref Range    Magnesium 2.0 1.9 - 2.7 mg/dL   CBC (With Platelets)   Result Value Ref Range    WBC 5.56 4.31 - 10.16 Thousand/uL    RBC 3.64 (L) 3.88 - 5.62 Million/uL    Hemoglobin 10.1 (L) 12.0 - 17.0 g/dL    Hematocrit 33.4 (L) 36.5 - 49.3 %    MCV 92 82 - 98 fL    MCH 27.7 26.8 - 34.3 pg    MCHC 30.2 (L) 31.4 - 37.4 g/dL    RDW 15.3 (H) 11.6 - 15.1 %    Platelets 205 149 - 390 Thousands/uL    MPV 9.9 8.9 - 12.7 fL   Basic metabolic panel   Result Value Ref Range    Sodium 133 (L) 135 - 147 mmol/L    Potassium 5.1 3.5 - 5.3 mmol/L    Chloride 96 96 - 108 mmol/L    CO2 28 21 - 32 mmol/L    ANION GAP 9 4 - 13 mmol/L    BUN 33 (H) 5 - 25 mg/dL    Creatinine 5.76 (H) 0.60 - 1.30 mg/dL    Glucose 111 65 - 140 mg/dL    Calcium 8.7 8.4 - 10.2 mg/dL    eGFR 9 ml/min/1.73sq m   Phosphorus   Result Value Ref Range    Phosphorus 3.3 2.3 - 4.1 mg/dL   Magnesium   Result Value Ref Range    Magnesium 1.9 1.9 - 2.7 mg/dL   CBC and differential   Result Value Ref Range    WBC 5.49 4.31 - 10.16 Thousand/uL    RBC 3.70 (L) 3.88 - 5.62 Million/uL    Hemoglobin 10.5 (L) 12.0 - 17.0 g/dL    Hematocrit 34.4 (L) 36.5 - 49.3 %    MCV 93 82 - 98 fL    MCH 28.4 26.8 - 34.3 pg    MCHC 30.5 (L) 31.4 - 37.4 g/dL    RDW 15.4 (H) 11.6 - 15.1 %    MPV 10.0 8.9 - 12.7 fL    Platelets 217 149 - 390 Thousands/uL    nRBC 0 /100 WBCs    Segmented % 62 43 - 75 %    Immature Grans % 0 0 - 2 %    Lymphocytes % 20 14 - 44 %    Monocytes %  12 4 - 12 %    Eosinophils Relative 5 0 - 6 %    Basophils Relative 1 0 - 1 %    Absolute Neutrophils 3.44 1.85 - 7.62 Thousands/µL    Absolute Immature Grans 0.02 0.00 - 0.20 Thousand/uL    Absolute Lymphocytes 1.07 0.60 - 4.47 Thousands/µL    Absolute Monocytes 0.68 0.17 - 1.22 Thousand/µL    Eosinophils Absolute 0.25 0.00 - 0.61 Thousand/µL    Basophils Absolute 0.03 0.00 - 0.10 Thousands/µL   Basic metabolic panel   Result Value Ref Range    Sodium 131 (L) 135 - 147 mmol/L    Potassium 4.7 3.5 - 5.3 mmol/L    Chloride 96 96 - 108 mmol/L    CO2 29 21 - 32 mmol/L    ANION GAP 6 4 - 13 mmol/L    BUN 31 (H) 5 - 25 mg/dL    Creatinine 5.06 (H) 0.60 - 1.30 mg/dL    Glucose 108 65 - 140 mg/dL    Calcium 8.9 8.4 - 10.2 mg/dL    eGFR 10 ml/min/1.73sq m   Phosphorus   Result Value Ref Range    Phosphorus 3.6 2.3 - 4.1 mg/dL   Magnesium   Result Value Ref Range    Magnesium 2.1 1.9 - 2.7 mg/dL   CBC and differential   Result Value Ref Range    WBC 8.18 4.31 - 10.16 Thousand/uL    RBC 3.71 (L) 3.88 - 5.62 Million/uL    Hemoglobin 10.4 (L) 12.0 - 17.0 g/dL    Hematocrit 34.1 (L) 36.5 - 49.3 %    MCV 92 82 - 98 fL    MCH 28.0 26.8 - 34.3 pg    MCHC 30.5 (L) 31.4 - 37.4 g/dL    RDW 15.3 (H) 11.6 - 15.1 %    MPV 9.5 8.9 - 12.7 fL    Platelets 232 149 - 390 Thousands/uL    nRBC 0 /100 WBCs    Segmented % 74 43 - 75 %    Immature Grans % 0 0 - 2 %    Lymphocytes % 13 (L) 14 - 44 %    Monocytes % 10 4 - 12 %    Eosinophils Relative 3 0 - 6 %    Basophils Relative 0 0 - 1 %    Absolute Neutrophils 6.03 1.85 - 7.62 Thousands/µL    Absolute Immature Grans 0.02 0.00 - 0.20 Thousand/uL    Absolute Lymphocytes 1.03 0.60 - 4.47 Thousands/µL    Absolute Monocytes 0.82 0.17 - 1.22 Thousand/µL    Eosinophils Absolute 0.25 0.00 - 0.61 Thousand/µL    Basophils Absolute 0.03 0.00 - 0.10 Thousands/µL   Basic metabolic panel   Result Value Ref Range    Sodium 131 (L) 135 - 147 mmol/L    Potassium 5.9 (H) 3.5 - 5.3 mmol/L    Chloride 95 (L) 96 -  108 mmol/L    CO2 28 21 - 32 mmol/L    ANION GAP 8 4 - 13 mmol/L    BUN 44 (H) 5 - 25 mg/dL    Creatinine 6.45 (H) 0.60 - 1.30 mg/dL    Glucose 92 65 - 140 mg/dL    Calcium 9.1 8.4 - 10.2 mg/dL    eGFR 8 ml/min/1.73sq m   ECG 12 lead   Result Value Ref Range    Ventricular Rate 80 BPM    Atrial Rate 80 BPM    CA Interval 170 ms    QRSD Interval 78 ms    QT Interval 392 ms    QTC Interval 452 ms    P [place] 26 degrees    QRS Axis 4 degrees    T Wave Axis 90 degrees   Fingerstick Glucose (POCT)   Result Value Ref Range    POC Glucose 108 65 - 140 mg/dl         Incidental Findings:   None    I reviewed the above mentioned incidental findings with the patient and/or family and they expressed understanding.    Hospital Course:   Naresh Carpio is a 65 y.o. male patient who originally presented to the hospital on 7/20/2025 due to acute encephalopathy secondary to missed dialysis sessions and incomplete dialysis sessions as the patient was refusing to complete the sessions above.  During the hospitalization he received dialysis sessions with remarkable improvement in mentation.  Currently the patient is alert and oriented at baseline he feels well and is asking to be discharged back to rehab.  He is very positive and looking forward to get therapy at rehab and follow up with his PCP.  Electrolyte imbalance noted as above discussed with nephrologist.  Nephrology cleared patient for discharge after dialysis session today.  We will check BMP in 3 days or sooner with next dialysis session.          Please see above list of diagnoses and related plan for additional information.     Discharge Day Visit / Exam:   Subjective: Patient seen today at bedside.  Does not have any active complaints  No chest pain or tightness, SOB or cough, dizziness or light headedness, N/V, Diarrhea of constipation.   No active urinary symptoms  Tolerating oral diet.     Vitals: Blood Pressure: 146/84 (07/24/25 1100)  Pulse: 69 (07/24/25  "1100)  Temperature: 97.6 °F (36.4 °C) (07/24/25 0845)  Temp Source: Oral (07/23/25 1300)  Respirations: 16 (07/24/25 1100)  Height: 5' 6\" (167.6 cm) (07/20/25 1425)  Weight - Scale: 98.1 kg (216 lb 4.3 oz) (07/20/25 1425)  SpO2: 97 % (07/24/25 0721)  Physical Exam  Vitals and nursing note reviewed.   Constitutional:       General: He is not in acute distress.     Appearance: Normal appearance.   HENT:      Head: Normocephalic and atraumatic.      Mouth/Throat:      Mouth: Mucous membranes are moist.     Eyes:      Conjunctiva/sclera: Conjunctivae normal.      Pupils: Pupils are equal, round, and reactive to light.       Cardiovascular:      Rate and Rhythm: Normal rate.      Pulses: Normal pulses.           Carotid pulses are 2+ on the right side and 2+ on the left side.       Radial pulses are 2+ on the right side and 2+ on the left side.      Heart sounds: S1 normal and S2 normal. Murmur heard.   Pulmonary:      Effort: No tachypnea, bradypnea or accessory muscle usage.      Breath sounds: Normal breath sounds and air entry. No decreased breath sounds, wheezing, rhonchi or rales.   Abdominal:      General: Abdomen is flat. Bowel sounds are normal. There is no distension.      Palpations: Abdomen is soft.      Tenderness: There is no abdominal tenderness.     Neurological:      General: No focal deficit present.      Mental Status: He is alert. Mental status is at baseline.     Psychiatric:         Mood and Affect: Mood normal.         Behavior: Behavior normal.          Discussion with Family: Patient declined call to .     Discharge instructions/Information to patient and family:   See after visit summary for information provided to patient and family.      Provisions for Follow-Up Care:  See after visit summary for information related to follow-up care and any pertinent home health orders.      Mobility at time of Discharge:   Basic Mobility Inpatient Raw Score: 18  -HLM Goal: 6: Walk 10 steps or " more  JH-HLM Achieved: 7: Walk 25 feet or more  HLM Goal achieved. Continue to encourage appropriate mobility.     Disposition:   Acute Rehab at [facility]     Planned Readmission: none     Administrative Statements   Discharge Statement:  I have spent a total time of 45 minutes in caring for this patient on the day of the visit/encounter. >30 minutes of time was spent on: Diagnostic results, Prognosis, Risks and benefits of tx options, and Instructions for management.    **Please Note: This note may have been constructed using a voice recognition system**

## 2025-07-24 NOTE — CASE MANAGEMENT
Case Management Discharge Planning Note    Patient name Naresh Carpio  Location [address] [place] [address]/[address]-* MRN [id number]  : 1960 Date 2025       Current Admission Date: 2025  Current Admission Diagnosis:Essential hypertension   Patient Active Problem List    Diagnosis Date Noted    Neuropathy     Abdominal pain 07/10/2025    Diabetic wound to left plantar foot 07/10/2025    Retinal detachment 07/10/2025    HTN (hypertension) 2025    CHF (congestive heart failure) (HCC) 2025    Anemia 2025    Agitation 2025    Abnormal urinalysis 2025    Metabolic encephalopathy 2025    Proliferative diabetic retinopathy of both eyes with macular edema associated with type 2 diabetes mellitus (HCC) 2025    COVID-19 2025    Bilateral leg edema 2025    Ambulatory dysfunction 2025    Retinopathy 2025    At risk for acid-base imbalance 2025    Electrolyte imbalance risk 2025    Hallucinations 2025    Type 2 diabetes mellitus with foot ulcer, without long-term current use of insulin ([facility]) 2025    Insomnia 2025    Hyperphosphatemia 2025    Wound of skin 2025    At high risk for skin breakdown 2025    At risk for venous thromboembolism (VTE) 2025    Impaired mobility and activities of daily living 2025    Visual disturbance 2025    Pseudoaneurysm (HCC) 2025    Acute pain due to trauma 2025    ESRD on dialysis ([facility]) 2025    Wounds, multiple 2025    Traumatic retroperitoneal hemorrhage 2025    Secondary hyperparathyroidism ([facility]) 2025    At risk for electrolyte imbalance 2025    Multiple open wounds of foot 2025    Disorder of acid-base balance 2025    L2 vertebral fracture (HCC) 2025    Non-compliance with treatment 2025    Generalized weakness 2025    Gout 2025    Closed fracture of proximal end of left humerus  with routine healing 02/10/2025    Left shoulder pain 02/08/2025    ESRD (end stage renal disease) on dialysis ([facility]) 01/16/2025    Primary hypertension 01/16/2025    Anemia in ESRD (end-stage renal disease)  ([facility]) 01/16/2025    Chronic kidney disease-mineral and bone disorder 01/16/2025    Renal lesion 12/19/2024    Chronic wound 11/20/2024    Volume overload secondary to noncompliance with hemodialysis 11/19/2024    Pleural effusion 11/19/2024    Elevated troponin 11/19/2024    Paroxysmal atrial fibrillation ([facility])     ESRD (end stage renal disease) ([facility]) 10/25/2024    Hyponatremia 10/19/2024    Acute on chronic anemia 10/14/2024    PAD (peripheral artery disease) ([facility]) 10/08/2024    Hyperkalemia 04/06/2024    Difficulty with speech 03/19/2024    History of amputation of hallux ([facility]) 03/18/2024    At risk for constipation 09/06/2023    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 mellitus, with necrosis of bone ([facility])     Acquired deformity of foot, right     Charcot's joint     Open wound of right foot 08/21/2023    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 mellitus, with bone involvement without evidence of necrosis ([facility])     Diabetic ulcer of left midfoot associated with type 2 diabetes mellitus, limited to breakdown of skin ([facility])     Diabetic polyneuropathy associated with type 2 diabetes mellitus ([facility])     History of amputation of lesser toe of left foot ([facility])     Onychomycosis     AMS (altered mental status) 08/20/2023    Traumatic retroperitoneal hematoma 08/19/2023    Osteoarthritis of left hip 07/27/2022    Severe Left Orchalgia 07/26/2022    Right shoulder pain 07/26/2022    SIRS (systemic inflammatory response syndrome) ([facility]) 07/26/2022    Left hip pain 07/06/2022    Hemodialysis status ([facility])     Hypervolemia     Chronic anemia 01/21/2022    History of prediabetes     Essential hypertension     History of TIA (transient ischemic attack)     T10 vertebral fracture  ([facility])       LOS (days): 3  Geometric Mean LOS (GMLOS) (days): 4.4  Days to GMLOS:1.2     OBJECTIVE:  Risk of Unplanned Readmission Score: 73.85         Current admission status: Inpatient   Preferred Pharmacy:   [id number] - [place], [place] - [address]  [address]  [facility] [address]  Phone: [phone] Fax: [phone]    [id number] - [place], [place] - [address]  [address]  [facility] [address]  Phone: [phone] Fax: [phone]    Primary Care Provider: Jeffrey Jackson    Primary Insurance: MEDICARE  Secondary Insurance:     DISCHARGE DETAILS:    Discharge planning discussed with:: Patient and sister Alicja (by phone)  Freedom of Choice: Yes    Comments - Freedom of Choice: Plan is for patient to return to [facility] at discharge.  He is medically cleared per attending and SW notified facility in [facility].  [facility] transport is scheduled for 1530 and attending and nursing were made aware.      CURTIS spoke by phone with sister Diana by phone to update her on plan for discharge.  Diana is in agreement with plan for patient to return to the facility and spoke about her concerns for patient's declining health.  She stated that the facility has given patient a deadline of 8/16 to decide whether he will stay long-term or leave.  Diana does not feel patient is able to live on his own any longer and has spoken with him about the need to make long-term plans.  Diana would like to speak with someone from the facility and CURTIS notified facility liaison Farheen with request to call Diana.      CM contacted family/caregiver?: Yes  Were Treatment Team discharge recommendations reviewed with patient/caregiver?: Yes  Did patient/caregiver verbalize understanding of patient care needs?: N/A- going to facility  Were patient/caregiver advised of the risks associated with not following Treatment Team discharge recommendations?: Yes    Contacts  Patient Contacts:  Alicja Carpio (sister)  Relationship to Patient:: Family  Contact Method: Phone  Phone Number: [phone]  Reason/Outcome: Emergency Contact, Discharge Planning    Requested Home Health Care         Is the patient interested in HHC at discharge?: No    DME Referral Provided  Referral made for DME?: No    Other Referral/Resources/Interventions Provided:  Interventions: Short Term Rehab, Facility Return, Transportation    Would you like to participate in our [facility] service program?  : No - Declined    Treatment Team Recommendation: Short Term Rehab, Facility Return  Expected Discharge Disposition: Facility Return  Additional Discharge Dispositions: Short Term Rehab  Transport at Discharge : Wheelchair van     Number/Name of Dispatcher: [facility] [employer]  Transported by (Company and Unit #): [employer]  ETA of Transport (Date): 07/24/25  ETA of Transport (Time): 1530 (1300 requested)         IMM Given (Date):: 07/24/25  IMM Given to:: Patient (IMM reviewed at bedside with patient and patient verbalized acknowledgement.  Patient is in agreement with discharge determination.  Patient declined copy and copy placed in scan bin for chart.)        Accepting Facility Name, City & State : [facility]  Receiving Facility/Agency Phone Number: [phone]

## 2025-07-24 NOTE — ASSESSMENT & PLAN NOTE
BP acceptable, continue UF with HD, continue current antihypertensive medications  Home Rx: Nifedipine 30 mg daily, Metoprolol succ 25 mg BID?, furosemide 20 mg twice a day.  Current continue current medication if it is persistently elevated may consider increasing the dose of nifedipine to 60 mg daily

## 2025-07-24 NOTE — PLAN OF CARE
Post-Dialysis RN Treatment Note    Blood Pressure:  Pre:  149/63 mm/Hg  Post:  152/72 mmHg   EDW:  89.5 kg    Weight:  Pre:  84.3 kg   Post:  81.1 kg   Mode of weight measurement: Bed Scale   Volume Removed:  3200 ml    Treatment duration: 210 minutes    NS given:  No    Treatment shortened Yes, describe: Patient's request   Medications given during Rx: Dilaudid   Estimated Kt/V:  Not Applicable   Access type: Permacath/TDC   Needle Gauge: N/A   Access Issues: No    Report called to primary nurse:   Dolores Huggins    Problem: METABOLIC, FLUID AND ELECTROLYTES - ADULT  Goal: Electrolytes maintained within normal limits  Description: INTERVENTIONS:  - Monitor labs and assess patient for signs and symptoms of electrolyte imbalances  - Administer electrolyte replacement as ordered  - Monitor response to electrolyte replacements, including repeat lab results as appropriate  - Instruct patient on fluid and nutrition as appropriate  Outcome: Progressing  Goal: Fluid balance maintained  Description: INTERVENTIONS:  - Monitor labs   - Monitor I/O and WT  - Instruct patient on fluid and nutrition as appropriate  - Assess for signs & symptoms of volume excess or deficit  Outcome: Progressing

## 2025-07-24 NOTE — DISCHARGE INSTR - AVS FIRST PAGE
Please make sure to check a blood test basic metabolic panel in the next 3 days or with the next dialysis session.

## 2025-07-27 LAB
ATRIAL RATE: 80 BPM
P AXIS: 26 DEGREES
PR INTERVAL: 170 MS
QRS AXIS: 4 DEGREES
QRSD INTERVAL: 78 MS
QT INTERVAL: 392 MS
QTC INTERVAL: 452 MS
T WAVE AXIS: 90 DEGREES
VENTRICULAR RATE: 80 BPM

## 2025-07-27 PROCEDURE — 93010 ELECTROCARDIOGRAM REPORT: CPT | Performed by: INTERNAL MEDICINE

## 2025-07-29 ENCOUNTER — HOSPITAL ENCOUNTER (EMERGENCY)
Facility: HOSPITAL | Age: 65
Discharge: ANOTHER HOSPITAL | End: 2025-07-29
Payer: MEDICARE

## 2025-07-29 ENCOUNTER — APPOINTMENT (EMERGENCY)
Dept: RADIOLOGY | Facility: HOSPITAL | Age: 65
End: 2025-07-29
Payer: MEDICARE

## 2025-07-29 VITALS
TEMPERATURE: 98 F | SYSTOLIC BLOOD PRESSURE: 139 MMHG | RESPIRATION RATE: 18 BRPM | HEART RATE: 64 BPM | DIASTOLIC BLOOD PRESSURE: 65 MMHG | OXYGEN SATURATION: 94 %

## 2025-07-29 DIAGNOSIS — M25.552 ACUTE HIP PAIN, LEFT: Primary | ICD-10-CM

## 2025-07-29 PROCEDURE — 99284 EMERGENCY DEPT VISIT MOD MDM: CPT

## 2025-07-29 PROCEDURE — 99283 EMERGENCY DEPT VISIT LOW MDM: CPT

## 2025-07-29 PROCEDURE — 73502 X-RAY EXAM HIP UNI 2-3 VIEWS: CPT

## (undated) DEVICE — PREMIUM DRY TRAY LF: Brand: MEDLINE INDUSTRIES, INC.

## (undated) DEVICE — GLOVE INDICATOR PI UNDERGLOVE SZ 8 BLUE

## (undated) DEVICE — KERLIX BANDAGE ROLL: Brand: KERLIX

## (undated) DEVICE — COBAN 4 IN STERILE

## (undated) DEVICE — GLOVE SRG BIOGEL 7.5

## (undated) DEVICE — ACE WRAP 4 IN UNSTERILE

## (undated) DEVICE — CURITY NON-ADHERENT STRIPS: Brand: CURITY

## (undated) DEVICE — BASIC DOUBLE BASIN 2-LF: Brand: MEDLINE INDUSTRIES, INC.

## (undated) DEVICE — CUFF TOURNIQUET 18 X 4 IN QUICK CONNECT DISP 1 BLADDER

## (undated) DEVICE — VAC DRESSING SENSATRAC RND

## (undated) DEVICE — SPONGE LAP 18 X 18 IN STRL RFD

## (undated) DEVICE — GAUZE SPONGES,16 PLY: Brand: CURITY

## (undated) DEVICE — SUT ETHILON 3-0 FSL 30 IN 1671H

## (undated) DEVICE — NEPTUNE E-SEP SMOKE EVACUATION PENCIL, COATED, 70MM BLADE, PUSH BUTTON SWITCH: Brand: NEPTUNE E-SEP

## (undated) DEVICE — INTENDED FOR TISSUE SEPARATION, AND OTHER PROCEDURES THAT REQUIRE A SHARP SURGICAL BLADE TO PUNCTURE OR CUT.: Brand: BARD-PARKER ® CARBON RIB-BACK BLADES

## (undated) DEVICE — TIBURON EXTREMITY SHEET: Brand: CONVERTORS

## (undated) DEVICE — CULTURE TUBE ANAEROBIC

## (undated) DEVICE — PACK GENERAL LF

## (undated) DEVICE — TOWEL SURG XR DETECT GREEN STRL RFD

## (undated) DEVICE — ARTHROSCOPY FLOOR MAT

## (undated) DEVICE — SUT MONOCRYL 3-0 PS-2 27 IN Y427H

## (undated) DEVICE — VAC CANISTER 500ML

## (undated) DEVICE — U-DRAPE: Brand: CONVERTORS

## (undated) DEVICE — ABDOMINAL PAD: Brand: DERMACEA

## (undated) DEVICE — PRECISION THIN (9.0 X 0.38 X 25.0MM)

## (undated) DEVICE — CULTURE TUBE AEROBIC

## (undated) DEVICE — POV-IOD SOLUTION 4OZ BT